# Patient Record
Sex: MALE | Race: OTHER | HISPANIC OR LATINO | URBAN - METROPOLITAN AREA
[De-identification: names, ages, dates, MRNs, and addresses within clinical notes are randomized per-mention and may not be internally consistent; named-entity substitution may affect disease eponyms.]

---

## 2017-01-24 ENCOUNTER — INPATIENT (INPATIENT)
Facility: HOSPITAL | Age: 52
LOS: 3 days | Discharge: HOME | DRG: 287 | End: 2017-01-28
Attending: INTERNAL MEDICINE | Admitting: INTERNAL MEDICINE
Payer: COMMERCIAL

## 2017-01-24 VITALS
RESPIRATION RATE: 18 BRPM | HEART RATE: 91 BPM | TEMPERATURE: 99 F | DIASTOLIC BLOOD PRESSURE: 107 MMHG | SYSTOLIC BLOOD PRESSURE: 171 MMHG | OXYGEN SATURATION: 95 %

## 2017-01-24 DIAGNOSIS — R07.9 CHEST PAIN, UNSPECIFIED: ICD-10-CM

## 2017-01-24 DIAGNOSIS — Z95.5 PRESENCE OF CORONARY ANGIOPLASTY IMPLANT AND GRAFT: Chronic | ICD-10-CM

## 2017-01-24 LAB
ALBUMIN SERPL ELPH-MCNC: 4.6 G/DL — SIGNIFICANT CHANGE UP (ref 3.3–5)
ALP SERPL-CCNC: 59 U/L — SIGNIFICANT CHANGE UP (ref 40–120)
ALT FLD-CCNC: 16 U/L RC — SIGNIFICANT CHANGE UP (ref 10–45)
ANION GAP SERPL CALC-SCNC: 14 MMOL/L — SIGNIFICANT CHANGE UP (ref 5–17)
AST SERPL-CCNC: 18 U/L — SIGNIFICANT CHANGE UP (ref 10–40)
BASOPHILS # BLD AUTO: 0 K/UL — SIGNIFICANT CHANGE UP (ref 0–0.2)
BASOPHILS NFR BLD AUTO: 0.1 % — SIGNIFICANT CHANGE UP (ref 0–2)
BILIRUB SERPL-MCNC: 0.4 MG/DL — SIGNIFICANT CHANGE UP (ref 0.2–1.2)
BUN SERPL-MCNC: 24 MG/DL — HIGH (ref 7–23)
CALCIUM SERPL-MCNC: 9.8 MG/DL — SIGNIFICANT CHANGE UP (ref 8.4–10.5)
CHLORIDE SERPL-SCNC: 98 MMOL/L — SIGNIFICANT CHANGE UP (ref 96–108)
CK SERPL-CCNC: 152 U/L — SIGNIFICANT CHANGE UP (ref 30–200)
CO2 SERPL-SCNC: 29 MMOL/L — SIGNIFICANT CHANGE UP (ref 22–31)
CREAT SERPL-MCNC: 1.49 MG/DL — HIGH (ref 0.5–1.3)
EOSINOPHIL # BLD AUTO: 0.2 K/UL — SIGNIFICANT CHANGE UP (ref 0–0.5)
EOSINOPHIL NFR BLD AUTO: 1.6 % — SIGNIFICANT CHANGE UP (ref 0–6)
GAS PNL BLDV: SIGNIFICANT CHANGE UP
GLUCOSE SERPL-MCNC: 120 MG/DL — HIGH (ref 70–99)
HCT VFR BLD CALC: 46 % — SIGNIFICANT CHANGE UP (ref 39–50)
HGB BLD-MCNC: 15.2 G/DL — SIGNIFICANT CHANGE UP (ref 13–17)
INR BLD: 1.15 RATIO — SIGNIFICANT CHANGE UP (ref 0.88–1.16)
LYMPHOCYTES # BLD AUTO: 1.9 K/UL — SIGNIFICANT CHANGE UP (ref 1–3.3)
LYMPHOCYTES # BLD AUTO: 19.3 % — SIGNIFICANT CHANGE UP (ref 13–44)
MCHC RBC-ENTMCNC: 27.8 PG — SIGNIFICANT CHANGE UP (ref 27–34)
MCHC RBC-ENTMCNC: 33.2 GM/DL — SIGNIFICANT CHANGE UP (ref 32–36)
MCV RBC AUTO: 83.9 FL — SIGNIFICANT CHANGE UP (ref 80–100)
MONOCYTES # BLD AUTO: 1.3 K/UL — HIGH (ref 0–0.9)
MONOCYTES NFR BLD AUTO: 12.9 % — SIGNIFICANT CHANGE UP (ref 2–14)
NEUTROPHILS # BLD AUTO: 6.6 K/UL — SIGNIFICANT CHANGE UP (ref 1.8–7.4)
NEUTROPHILS NFR BLD AUTO: 66.1 % — SIGNIFICANT CHANGE UP (ref 43–77)
PLATELET # BLD AUTO: 220 K/UL — SIGNIFICANT CHANGE UP (ref 150–400)
POTASSIUM SERPL-MCNC: 4.4 MMOL/L — SIGNIFICANT CHANGE UP (ref 3.5–5.3)
POTASSIUM SERPL-SCNC: 4.4 MMOL/L — SIGNIFICANT CHANGE UP (ref 3.5–5.3)
PROT SERPL-MCNC: 8.1 G/DL — SIGNIFICANT CHANGE UP (ref 6–8.3)
PROTHROM AB SERPL-ACNC: 12.6 SEC — SIGNIFICANT CHANGE UP (ref 10–13.1)
RAPID RVP RESULT: DETECTED
RBC # BLD: 5.48 M/UL — SIGNIFICANT CHANGE UP (ref 4.2–5.8)
RBC # FLD: 13.2 % — SIGNIFICANT CHANGE UP (ref 10.3–14.5)
RSV RNA SPEC QL NAA+PROBE: DETECTED
SODIUM SERPL-SCNC: 141 MMOL/L — SIGNIFICANT CHANGE UP (ref 135–145)
TROPONIN T SERPL-MCNC: <0.01 NG/ML — SIGNIFICANT CHANGE UP (ref 0–0.06)
WBC # BLD: 10.1 K/UL — SIGNIFICANT CHANGE UP (ref 3.8–10.5)
WBC # FLD AUTO: 10.1 K/UL — SIGNIFICANT CHANGE UP (ref 3.8–10.5)

## 2017-01-24 PROCEDURE — 71020: CPT | Mod: 26

## 2017-01-24 PROCEDURE — 99223 1ST HOSP IP/OBS HIGH 75: CPT

## 2017-01-24 PROCEDURE — 93010 ELECTROCARDIOGRAM REPORT: CPT

## 2017-01-24 PROCEDURE — 99285 EMERGENCY DEPT VISIT HI MDM: CPT | Mod: 25

## 2017-01-24 RX ORDER — SODIUM CHLORIDE 9 MG/ML
3 INJECTION INTRAMUSCULAR; INTRAVENOUS; SUBCUTANEOUS ONCE
Qty: 0 | Refills: 0 | Status: COMPLETED | OUTPATIENT
Start: 2017-01-24 | End: 2017-01-24

## 2017-01-24 RX ORDER — IPRATROPIUM/ALBUTEROL SULFATE 18-103MCG
3 AEROSOL WITH ADAPTER (GRAM) INHALATION ONCE
Qty: 0 | Refills: 0 | Status: COMPLETED | OUTPATIENT
Start: 2017-01-24 | End: 2017-01-24

## 2017-01-24 RX ADMIN — Medication 3 MILLILITER(S): at 22:14

## 2017-01-24 RX ADMIN — SODIUM CHLORIDE 3 MILLILITER(S): 9 INJECTION INTRAMUSCULAR; INTRAVENOUS; SUBCUTANEOUS at 20:05

## 2017-01-24 RX ADMIN — Medication 40 MILLIGRAM(S): at 22:14

## 2017-01-24 NOTE — ED ADULT NURSE NOTE - OBJECTIVE STATEMENT
presents with c/o exertional SOB, CP worsening since Thursday. pt states has had fever, cough, congestion, sore throat, body aches. pt states was seen by PMD and was started on Medrol Pack, Levaquin. pt states symptoms are getting worse.

## 2017-01-24 NOTE — ED PROVIDER NOTE - OBJECTIVE STATEMENT
51 male presents with c/o exertional SOB, CP worsening since Thursday. pt states has had fever, cough, congestion, sore throat, body aches. pt states was seen by PMD and was started on Medrol Pack, Levaquin. pt states symptoms are getting worse.

## 2017-01-24 NOTE — ED PROVIDER NOTE - MEDICAL DECISION MAKING DETAILS
****ATTENDING**** 52yo male hx CAD, CHF, pw chest pain and shortness of breath. States pain is substernal, non radiating. + cough with sob that is worse with exertion. States his symptoms have been progressively getting worse. No fever or chills. No recent travel. On exam, Patient is awake,alert,oriented x 3. Patient is well appearing and in no acute distress. Patient's chest w b/l wheezing, +s1s2. Abdomen is soft nd/nt +BS. Extremity with no swelling or calf tenderness.  patient's EKG reviewed with changes, Check Labs, CE, Xray chest. RVP.   Patient took ASA 325mg today.

## 2017-01-24 NOTE — ED PROVIDER NOTE - ATTENDING CONTRIBUTION TO CARE
****ATTENDING**** 50yo male hx CAD, CHF, pw chest pain and shortness of breath. States pain is substernal, non radiating. + cough with sob that is worse with exertion. States his symptoms have been progressively getting worse. No fever or chills. No recent travel. On exam, Patient is awake,alert,oriented x 3. Patient is well appearing and in no acute distress. Patient's chest w b/l wheezing, +s1s2. Abdomen is soft nd/nt +BS. Extremity with no swelling or calf tenderness.  patient's EKG reviewed with changes, Check Labs, CE, Xray chest. RVP.   Patient took ASA 325mg today.

## 2017-01-25 DIAGNOSIS — I10 ESSENTIAL (PRIMARY) HYPERTENSION: ICD-10-CM

## 2017-01-25 DIAGNOSIS — I25.10 ATHEROSCLEROTIC HEART DISEASE OF NATIVE CORONARY ARTERY WITHOUT ANGINA PECTORIS: ICD-10-CM

## 2017-01-25 DIAGNOSIS — B97.4 RESPIRATORY SYNCYTIAL VIRUS AS THE CAUSE OF DISEASES CLASSIFIED ELSEWHERE: ICD-10-CM

## 2017-01-25 DIAGNOSIS — Z29.9 ENCOUNTER FOR PROPHYLACTIC MEASURES, UNSPECIFIED: ICD-10-CM

## 2017-01-25 DIAGNOSIS — R63.8 OTHER SYMPTOMS AND SIGNS CONCERNING FOOD AND FLUID INTAKE: ICD-10-CM

## 2017-01-25 DIAGNOSIS — R07.9 CHEST PAIN, UNSPECIFIED: ICD-10-CM

## 2017-01-25 LAB
ANION GAP SERPL CALC-SCNC: 17 MMOL/L — SIGNIFICANT CHANGE UP (ref 5–17)
BASOPHILS # BLD AUTO: 0.01 K/UL — SIGNIFICANT CHANGE UP (ref 0–0.2)
BASOPHILS NFR BLD AUTO: 0.1 % — SIGNIFICANT CHANGE UP (ref 0–2)
BUN SERPL-MCNC: 28 MG/DL — HIGH (ref 7–23)
CALCIUM SERPL-MCNC: 9.5 MG/DL — SIGNIFICANT CHANGE UP (ref 8.4–10.5)
CHLORIDE SERPL-SCNC: 94 MMOL/L — LOW (ref 96–108)
CK MB BLD-MCNC: 2.9 % — SIGNIFICANT CHANGE UP (ref 0–3.5)
CK MB CFR SERPL CALC: 2.9 NG/ML — SIGNIFICANT CHANGE UP (ref 0–6.7)
CK MB CFR SERPL CALC: 3.3 NG/ML — SIGNIFICANT CHANGE UP (ref 0–6.7)
CK SERPL-CCNC: 100 U/L — SIGNIFICANT CHANGE UP (ref 30–200)
CO2 SERPL-SCNC: 22 MMOL/L — SIGNIFICANT CHANGE UP (ref 22–31)
CREAT SERPL-MCNC: 1.66 MG/DL — HIGH (ref 0.5–1.3)
EOSINOPHIL # BLD AUTO: 0.02 K/UL — SIGNIFICANT CHANGE UP (ref 0–0.5)
EOSINOPHIL NFR BLD AUTO: 0.3 % — SIGNIFICANT CHANGE UP (ref 0–6)
GLUCOSE SERPL-MCNC: 377 MG/DL — HIGH (ref 70–99)
HCT VFR BLD CALC: 42.8 % — SIGNIFICANT CHANGE UP (ref 39–50)
HGB BLD-MCNC: 13.9 G/DL — SIGNIFICANT CHANGE UP (ref 13–17)
IMM GRANULOCYTES NFR BLD AUTO: 0.3 % — SIGNIFICANT CHANGE UP (ref 0–1.5)
LACTATE SERPL-SCNC: 2 MMOL/L — SIGNIFICANT CHANGE UP (ref 0.7–2)
LYMPHOCYTES # BLD AUTO: 1.12 K/UL — SIGNIFICANT CHANGE UP (ref 1–3.3)
LYMPHOCYTES # BLD AUTO: 14.8 % — SIGNIFICANT CHANGE UP (ref 13–44)
MCHC RBC-ENTMCNC: 27.4 PG — SIGNIFICANT CHANGE UP (ref 27–34)
MCHC RBC-ENTMCNC: 32.5 GM/DL — SIGNIFICANT CHANGE UP (ref 32–36)
MCV RBC AUTO: 84.3 FL — SIGNIFICANT CHANGE UP (ref 80–100)
MONOCYTES # BLD AUTO: 0.21 K/UL — SIGNIFICANT CHANGE UP (ref 0–0.9)
MONOCYTES NFR BLD AUTO: 2.8 % — SIGNIFICANT CHANGE UP (ref 2–14)
NEUTROPHILS # BLD AUTO: 6.18 K/UL — SIGNIFICANT CHANGE UP (ref 1.8–7.4)
NEUTROPHILS NFR BLD AUTO: 81.7 % — HIGH (ref 43–77)
PLATELET # BLD AUTO: 187 K/UL — SIGNIFICANT CHANGE UP (ref 150–400)
POTASSIUM SERPL-MCNC: 4.9 MMOL/L — SIGNIFICANT CHANGE UP (ref 3.5–5.3)
POTASSIUM SERPL-SCNC: 4.9 MMOL/L — SIGNIFICANT CHANGE UP (ref 3.5–5.3)
RBC # BLD: 5.08 M/UL — SIGNIFICANT CHANGE UP (ref 4.2–5.8)
RBC # FLD: 14.4 % — SIGNIFICANT CHANGE UP (ref 10.3–14.5)
SODIUM SERPL-SCNC: 133 MMOL/L — LOW (ref 135–145)
TROPONIN T SERPL-MCNC: <0.01 NG/ML — SIGNIFICANT CHANGE UP (ref 0–0.06)
TROPONIN T SERPL-MCNC: <0.01 NG/ML — SIGNIFICANT CHANGE UP (ref 0–0.06)
WBC # BLD: 7.56 K/UL — SIGNIFICANT CHANGE UP (ref 3.8–10.5)
WBC # FLD AUTO: 7.56 K/UL — SIGNIFICANT CHANGE UP (ref 3.8–10.5)

## 2017-01-25 PROCEDURE — 93306 TTE W/DOPPLER COMPLETE: CPT | Mod: 26

## 2017-01-25 PROCEDURE — 78582 LUNG VENTILAT&PERFUS IMAGING: CPT | Mod: 26

## 2017-01-25 RX ORDER — DOCUSATE SODIUM 100 MG
100 CAPSULE ORAL THREE TIMES A DAY
Qty: 0 | Refills: 0 | Status: DISCONTINUED | OUTPATIENT
Start: 2017-01-25 | End: 2017-01-28

## 2017-01-25 RX ORDER — ACETAMINOPHEN 500 MG
650 TABLET ORAL EVERY 6 HOURS
Qty: 0 | Refills: 0 | Status: DISCONTINUED | OUTPATIENT
Start: 2017-01-25 | End: 2017-01-28

## 2017-01-25 RX ORDER — PANTOPRAZOLE SODIUM 20 MG/1
40 TABLET, DELAYED RELEASE ORAL
Qty: 0 | Refills: 0 | Status: DISCONTINUED | OUTPATIENT
Start: 2017-01-25 | End: 2017-01-28

## 2017-01-25 RX ORDER — SENNA PLUS 8.6 MG/1
2 TABLET ORAL AT BEDTIME
Qty: 0 | Refills: 0 | Status: DISCONTINUED | OUTPATIENT
Start: 2017-01-25 | End: 2017-01-28

## 2017-01-25 RX ORDER — CLOPIDOGREL BISULFATE 75 MG/1
75 TABLET, FILM COATED ORAL DAILY
Qty: 0 | Refills: 0 | Status: DISCONTINUED | OUTPATIENT
Start: 2017-01-25 | End: 2017-01-28

## 2017-01-25 RX ORDER — BUDESONIDE, MICRONIZED 100 %
0.5 POWDER (GRAM) MISCELLANEOUS EVERY 12 HOURS
Qty: 0 | Refills: 0 | Status: DISCONTINUED | OUTPATIENT
Start: 2017-01-25 | End: 2017-01-28

## 2017-01-25 RX ORDER — CARVEDILOL PHOSPHATE 80 MG/1
25 CAPSULE, EXTENDED RELEASE ORAL EVERY 12 HOURS
Qty: 0 | Refills: 0 | Status: DISCONTINUED | OUTPATIENT
Start: 2017-01-25 | End: 2017-01-28

## 2017-01-25 RX ORDER — ATORVASTATIN CALCIUM 80 MG/1
40 TABLET, FILM COATED ORAL AT BEDTIME
Qty: 0 | Refills: 0 | Status: DISCONTINUED | OUTPATIENT
Start: 2017-01-25 | End: 2017-01-28

## 2017-01-25 RX ORDER — IPRATROPIUM/ALBUTEROL SULFATE 18-103MCG
3 AEROSOL WITH ADAPTER (GRAM) INHALATION
Qty: 0 | Refills: 0 | Status: DISCONTINUED | OUTPATIENT
Start: 2017-01-25 | End: 2017-01-28

## 2017-01-25 RX ORDER — IPRATROPIUM/ALBUTEROL SULFATE 18-103MCG
3 AEROSOL WITH ADAPTER (GRAM) INHALATION EVERY 6 HOURS
Qty: 0 | Refills: 0 | Status: DISCONTINUED | OUTPATIENT
Start: 2017-01-25 | End: 2017-01-25

## 2017-01-25 RX ORDER — LISINOPRIL 2.5 MG/1
10 TABLET ORAL DAILY
Qty: 0 | Refills: 0 | Status: DISCONTINUED | OUTPATIENT
Start: 2017-01-25 | End: 2017-01-28

## 2017-01-25 RX ORDER — HEPARIN SODIUM 5000 [USP'U]/ML
5000 INJECTION INTRAVENOUS; SUBCUTANEOUS EVERY 12 HOURS
Qty: 0 | Refills: 0 | Status: DISCONTINUED | OUTPATIENT
Start: 2017-01-25 | End: 2017-01-28

## 2017-01-25 RX ORDER — ASPIRIN/CALCIUM CARB/MAGNESIUM 324 MG
81 TABLET ORAL DAILY
Qty: 0 | Refills: 0 | Status: DISCONTINUED | OUTPATIENT
Start: 2017-01-25 | End: 2017-01-28

## 2017-01-25 RX ADMIN — Medication 81 MILLIGRAM(S): at 12:15

## 2017-01-25 RX ADMIN — CARVEDILOL PHOSPHATE 25 MILLIGRAM(S): 80 CAPSULE, EXTENDED RELEASE ORAL at 05:53

## 2017-01-25 RX ADMIN — Medication 3 MILLILITER(S): at 22:04

## 2017-01-25 RX ADMIN — Medication 20 MILLIGRAM(S): at 19:41

## 2017-01-25 RX ADMIN — Medication 3 MILLILITER(S): at 19:41

## 2017-01-25 RX ADMIN — CARVEDILOL PHOSPHATE 25 MILLIGRAM(S): 80 CAPSULE, EXTENDED RELEASE ORAL at 19:40

## 2017-01-25 RX ADMIN — Medication 40 MILLIGRAM(S): at 06:03

## 2017-01-25 RX ADMIN — PANTOPRAZOLE SODIUM 40 MILLIGRAM(S): 20 TABLET, DELAYED RELEASE ORAL at 05:53

## 2017-01-25 RX ADMIN — CLOPIDOGREL BISULFATE 75 MILLIGRAM(S): 75 TABLET, FILM COATED ORAL at 12:15

## 2017-01-25 RX ADMIN — Medication 3 MILLILITER(S): at 05:53

## 2017-01-25 RX ADMIN — Medication 3 MILLILITER(S): at 12:14

## 2017-01-25 RX ADMIN — Medication 0.5 MILLIGRAM(S): at 22:03

## 2017-01-25 RX ADMIN — ATORVASTATIN CALCIUM 40 MILLIGRAM(S): 80 TABLET, FILM COATED ORAL at 22:03

## 2017-01-25 RX ADMIN — LISINOPRIL 10 MILLIGRAM(S): 2.5 TABLET ORAL at 05:53

## 2017-01-25 NOTE — H&P ADULT. - EKG AND INTERPRETATION
NSR @ 80 BPM, no ST changes, no TWI, axis normal, poor R wave progression, changed from prior, but suspect lead placement

## 2017-01-25 NOTE — H&P ADULT. - PROBLEM SELECTOR PLAN 1
--continue duonebs q6h  --continue prednisone 40mg daily  --robitussin for symptoms  --holding ABx, doubt superinfection

## 2017-01-25 NOTE — H&P ADULT. - LAB RESULTS AND INTERPRETATION
Labs personally reviewed.  Labs notable for WBC 10.1 with normal diff, Plt 220,  Cr 1.49, BUN 24, troponin negative and , lactate 2.7, RVP positive for RSV.

## 2017-01-25 NOTE — H&P ADULT. - HISTORY OF PRESENT ILLNESS
This is a 51 year old gentleman with history of MI s/p stent in 2009 on aspirin and plavix, history of intracranial bleed, presenting with cough, SOB, fevers at home.  Patient initially noted onset of cough productive of brownish sputum 5 days PTA.  He presented to PMD that day and was prescribed a medrol dose pack and levaquin. The day following, he noted sore throat.  Over the weekend he had worsening MATAMOROS, then day PTA he began to notice 8/10 bandlike chest pain which only occured with coughing, non-exertional, non-radiating, non-positional.  He also noted fevers.      On arrival to ED, patient afebrile (Tmax 98.8), HR 90s, /70, satting 95% on RA.  Labs notable for WBC 10.1 with normal diff, Plt 220,  Cr 1.49, BUN 24, troponin negative and , lactate 2.7, RVP positive for RSV.  CXR clear with normal cardiomediastinal border.  Received 40 methylpred and duonebs with improvement in symptoms.

## 2017-01-25 NOTE — H&P ADULT. - NEGATIVE CARDIOVASCULAR SYMPTOMS
no orthopnea/no paroxysmal nocturnal dyspnea/no palpitations/no peripheral edema/no dyspnea on exertion

## 2017-01-25 NOTE — H&P ADULT. - PROBLEM SELECTOR PLAN 2
--DDx includes musculoskeletal in setting of cough, pericarditis, less likely ACS  --suspect this is 2/2 coughing, band-like across chest  --tylenol for pain  --control cough  --EKG with mild changes, suspect lead placement, no c/w pericarditis or ischemia  --will order TTE to r/o worsening failure in setting of infection (exam inconsistent with this) vs. pericarditis

## 2017-01-25 NOTE — H&P ADULT. - ASSESSMENT
This is a 51 year old gentleman with history of MI s/p stent in 2009 on aspirin and plavix, history of intracranial bleed, presenting with cough, SOB, fevers at home, found to be RSV positive.

## 2017-01-25 NOTE — H&P ADULT. - FAMILY HISTORY
<<-----Click on this checkbox to enter Family History Family history of hypertension in mother, Mother     Sibling  Still living? No  Family history of meningitis, Age at diagnosis: Age Unknown

## 2017-01-26 LAB
ANION GAP SERPL CALC-SCNC: 9 MMOL/L — SIGNIFICANT CHANGE UP (ref 5–17)
APPEARANCE UR: CLEAR — SIGNIFICANT CHANGE UP
BASOPHILS # BLD AUTO: 0.01 K/UL — SIGNIFICANT CHANGE UP (ref 0–0.2)
BASOPHILS NFR BLD AUTO: 0.1 % — SIGNIFICANT CHANGE UP (ref 0–2)
BILIRUB UR-MCNC: NEGATIVE — SIGNIFICANT CHANGE UP
BUN SERPL-MCNC: 30 MG/DL — HIGH (ref 7–23)
CALCIUM SERPL-MCNC: 9.2 MG/DL — SIGNIFICANT CHANGE UP (ref 8.4–10.5)
CHLORIDE SERPL-SCNC: 95 MMOL/L — LOW (ref 96–108)
CO2 SERPL-SCNC: 28 MMOL/L — SIGNIFICANT CHANGE UP (ref 22–31)
COLOR SPEC: YELLOW — SIGNIFICANT CHANGE UP
CREAT SERPL-MCNC: 1.27 MG/DL — SIGNIFICANT CHANGE UP (ref 0.5–1.3)
DIFF PNL FLD: NEGATIVE — SIGNIFICANT CHANGE UP
EOSINOPHIL # BLD AUTO: 0.01 K/UL — SIGNIFICANT CHANGE UP (ref 0–0.5)
EOSINOPHIL NFR BLD AUTO: 0.1 % — SIGNIFICANT CHANGE UP (ref 0–6)
GAS PNL BLDV: SIGNIFICANT CHANGE UP
GLUCOSE SERPL-MCNC: 417 MG/DL — HIGH (ref 70–99)
GLUCOSE UR QL: 1000 MG/DL
HCT VFR BLD CALC: 40.1 % — SIGNIFICANT CHANGE UP (ref 39–50)
HGB BLD-MCNC: 13.4 G/DL — SIGNIFICANT CHANGE UP (ref 13–17)
IMM GRANULOCYTES NFR BLD AUTO: 0.2 % — SIGNIFICANT CHANGE UP (ref 0–1.5)
KETONES UR-MCNC: NEGATIVE — SIGNIFICANT CHANGE UP
LEUKOCYTE ESTERASE UR-ACNC: NEGATIVE — SIGNIFICANT CHANGE UP
LYMPHOCYTES # BLD AUTO: 1.54 K/UL — SIGNIFICANT CHANGE UP (ref 1–3.3)
LYMPHOCYTES # BLD AUTO: 13.4 % — SIGNIFICANT CHANGE UP (ref 13–44)
MCHC RBC-ENTMCNC: 28 PG — SIGNIFICANT CHANGE UP (ref 27–34)
MCHC RBC-ENTMCNC: 33.4 GM/DL — SIGNIFICANT CHANGE UP (ref 32–36)
MCV RBC AUTO: 83.9 FL — SIGNIFICANT CHANGE UP (ref 80–100)
MONOCYTES # BLD AUTO: 0.43 K/UL — SIGNIFICANT CHANGE UP (ref 0–0.9)
MONOCYTES NFR BLD AUTO: 3.8 % — SIGNIFICANT CHANGE UP (ref 2–14)
NEUTROPHILS # BLD AUTO: 9.44 K/UL — HIGH (ref 1.8–7.4)
NEUTROPHILS NFR BLD AUTO: 82.4 % — HIGH (ref 43–77)
NITRITE UR-MCNC: NEGATIVE — SIGNIFICANT CHANGE UP
PH UR: 5.5 — SIGNIFICANT CHANGE UP (ref 5–8)
PLATELET # BLD AUTO: 193 K/UL — SIGNIFICANT CHANGE UP (ref 150–400)
POTASSIUM SERPL-MCNC: 4.8 MMOL/L — SIGNIFICANT CHANGE UP (ref 3.5–5.3)
POTASSIUM SERPL-SCNC: 4.8 MMOL/L — SIGNIFICANT CHANGE UP (ref 3.5–5.3)
PROT UR-MCNC: NEGATIVE MG/DL — SIGNIFICANT CHANGE UP
RBC # BLD: 4.78 M/UL — SIGNIFICANT CHANGE UP (ref 4.2–5.8)
RBC # FLD: 14.3 % — SIGNIFICANT CHANGE UP (ref 10.3–14.5)
SODIUM SERPL-SCNC: 132 MMOL/L — LOW (ref 135–145)
SP GR SPEC: 1.04 — HIGH (ref 1.01–1.02)
TSH SERPL-MCNC: 0.29 UIU/ML — SIGNIFICANT CHANGE UP (ref 0.27–4.2)
UROBILINOGEN FLD QL: NEGATIVE MG/DL — SIGNIFICANT CHANGE UP
WBC # BLD: 11.45 K/UL — HIGH (ref 3.8–10.5)
WBC # FLD AUTO: 11.45 K/UL — HIGH (ref 3.8–10.5)

## 2017-01-26 PROCEDURE — 94010 BREATHING CAPACITY TEST: CPT | Mod: 26

## 2017-01-26 RX ORDER — FUROSEMIDE 40 MG
40 TABLET ORAL
Qty: 0 | Refills: 0 | Status: DISCONTINUED | OUTPATIENT
Start: 2017-01-26 | End: 2017-01-27

## 2017-01-26 RX ADMIN — Medication 100 MILLIGRAM(S): at 22:07

## 2017-01-26 RX ADMIN — Medication 20 MILLIGRAM(S): at 22:10

## 2017-01-26 RX ADMIN — ATORVASTATIN CALCIUM 40 MILLIGRAM(S): 80 TABLET, FILM COATED ORAL at 22:07

## 2017-01-26 RX ADMIN — CARVEDILOL PHOSPHATE 25 MILLIGRAM(S): 80 CAPSULE, EXTENDED RELEASE ORAL at 17:13

## 2017-01-26 RX ADMIN — Medication 20 MILLIGRAM(S): at 00:28

## 2017-01-26 RX ADMIN — Medication 81 MILLIGRAM(S): at 11:04

## 2017-01-26 RX ADMIN — Medication 3 MILLILITER(S): at 22:06

## 2017-01-26 RX ADMIN — Medication 3 MILLILITER(S): at 13:00

## 2017-01-26 RX ADMIN — Medication 3 MILLILITER(S): at 09:50

## 2017-01-26 RX ADMIN — CARVEDILOL PHOSPHATE 25 MILLIGRAM(S): 80 CAPSULE, EXTENDED RELEASE ORAL at 05:47

## 2017-01-26 RX ADMIN — Medication 20 MILLIGRAM(S): at 11:06

## 2017-01-26 RX ADMIN — Medication 0.5 MILLIGRAM(S): at 17:14

## 2017-01-26 RX ADMIN — Medication 20 MILLIGRAM(S): at 05:47

## 2017-01-26 RX ADMIN — Medication 3 MILLILITER(S): at 05:47

## 2017-01-26 RX ADMIN — Medication 3 MILLILITER(S): at 17:15

## 2017-01-26 RX ADMIN — Medication 40 MILLIGRAM(S): at 22:05

## 2017-01-26 RX ADMIN — CLOPIDOGREL BISULFATE 75 MILLIGRAM(S): 75 TABLET, FILM COATED ORAL at 11:03

## 2017-01-26 RX ADMIN — LISINOPRIL 10 MILLIGRAM(S): 2.5 TABLET ORAL at 05:47

## 2017-01-26 RX ADMIN — Medication 0.5 MILLIGRAM(S): at 05:47

## 2017-01-26 RX ADMIN — PANTOPRAZOLE SODIUM 40 MILLIGRAM(S): 20 TABLET, DELAYED RELEASE ORAL at 07:13

## 2017-01-27 PROCEDURE — 93458 L HRT ARTERY/VENTRICLE ANGIO: CPT | Mod: 26

## 2017-01-27 RX ORDER — FUROSEMIDE 40 MG
40 TABLET ORAL DAILY
Qty: 0 | Refills: 0 | Status: DISCONTINUED | OUTPATIENT
Start: 2017-01-27 | End: 2017-01-28

## 2017-01-27 RX ADMIN — Medication 100 MILLIGRAM(S): at 05:18

## 2017-01-27 RX ADMIN — Medication 20 MILLIGRAM(S): at 05:26

## 2017-01-27 RX ADMIN — Medication 40 MILLIGRAM(S): at 05:19

## 2017-01-27 RX ADMIN — ATORVASTATIN CALCIUM 40 MILLIGRAM(S): 80 TABLET, FILM COATED ORAL at 23:33

## 2017-01-27 RX ADMIN — Medication 20 MILLIGRAM(S): at 18:07

## 2017-01-27 RX ADMIN — Medication 3 MILLILITER(S): at 05:19

## 2017-01-27 RX ADMIN — CARVEDILOL PHOSPHATE 25 MILLIGRAM(S): 80 CAPSULE, EXTENDED RELEASE ORAL at 18:12

## 2017-01-27 RX ADMIN — Medication 0.5 MILLIGRAM(S): at 05:19

## 2017-01-27 RX ADMIN — CARVEDILOL PHOSPHATE 25 MILLIGRAM(S): 80 CAPSULE, EXTENDED RELEASE ORAL at 05:17

## 2017-01-27 RX ADMIN — Medication 100 MILLIGRAM(S): at 23:33

## 2017-01-27 RX ADMIN — Medication 3 MILLILITER(S): at 11:07

## 2017-01-27 RX ADMIN — Medication 20 MILLIGRAM(S): at 23:35

## 2017-01-27 RX ADMIN — PANTOPRAZOLE SODIUM 40 MILLIGRAM(S): 20 TABLET, DELAYED RELEASE ORAL at 07:03

## 2017-01-27 RX ADMIN — Medication 3 MILLILITER(S): at 18:14

## 2017-01-27 RX ADMIN — Medication 0.5 MILLIGRAM(S): at 20:05

## 2017-01-27 RX ADMIN — Medication 3 MILLILITER(S): at 15:12

## 2017-01-27 RX ADMIN — Medication 81 MILLIGRAM(S): at 11:24

## 2017-01-27 RX ADMIN — CLOPIDOGREL BISULFATE 75 MILLIGRAM(S): 75 TABLET, FILM COATED ORAL at 11:25

## 2017-01-27 RX ADMIN — LISINOPRIL 10 MILLIGRAM(S): 2.5 TABLET ORAL at 05:18

## 2017-01-27 RX ADMIN — Medication 20 MILLIGRAM(S): at 11:25

## 2017-01-27 RX ADMIN — Medication 3 MILLILITER(S): at 23:33

## 2017-01-28 ENCOUNTER — TRANSCRIPTION ENCOUNTER (OUTPATIENT)
Age: 52
End: 2017-01-28

## 2017-01-28 VITALS — WEIGHT: 197.09 LBS

## 2017-01-28 PROCEDURE — 82565 ASSAY OF CREATININE: CPT

## 2017-01-28 PROCEDURE — 82550 ASSAY OF CK (CPK): CPT

## 2017-01-28 PROCEDURE — 84443 ASSAY THYROID STIM HORMONE: CPT

## 2017-01-28 PROCEDURE — 84484 ASSAY OF TROPONIN QUANT: CPT

## 2017-01-28 PROCEDURE — 87486 CHLMYD PNEUM DNA AMP PROBE: CPT

## 2017-01-28 PROCEDURE — 85027 COMPLETE CBC AUTOMATED: CPT

## 2017-01-28 PROCEDURE — 82553 CREATINE MB FRACTION: CPT

## 2017-01-28 PROCEDURE — 87581 M.PNEUMON DNA AMP PROBE: CPT

## 2017-01-28 PROCEDURE — 81003 URINALYSIS AUTO W/O SCOPE: CPT

## 2017-01-28 PROCEDURE — 85014 HEMATOCRIT: CPT

## 2017-01-28 PROCEDURE — 94010 BREATHING CAPACITY TEST: CPT

## 2017-01-28 PROCEDURE — 71046 X-RAY EXAM CHEST 2 VIEWS: CPT

## 2017-01-28 PROCEDURE — 87798 DETECT AGENT NOS DNA AMP: CPT

## 2017-01-28 PROCEDURE — C1769: CPT

## 2017-01-28 PROCEDURE — 94640 AIRWAY INHALATION TREATMENT: CPT

## 2017-01-28 PROCEDURE — 84295 ASSAY OF SERUM SODIUM: CPT

## 2017-01-28 PROCEDURE — 82803 BLOOD GASES ANY COMBINATION: CPT

## 2017-01-28 PROCEDURE — 82435 ASSAY OF BLOOD CHLORIDE: CPT

## 2017-01-28 PROCEDURE — 82947 ASSAY GLUCOSE BLOOD QUANT: CPT

## 2017-01-28 PROCEDURE — 84132 ASSAY OF SERUM POTASSIUM: CPT

## 2017-01-28 PROCEDURE — 93458 L HRT ARTERY/VENTRICLE ANGIO: CPT

## 2017-01-28 PROCEDURE — 80053 COMPREHEN METABOLIC PANEL: CPT

## 2017-01-28 PROCEDURE — 83605 ASSAY OF LACTIC ACID: CPT

## 2017-01-28 PROCEDURE — 87633 RESP VIRUS 12-25 TARGETS: CPT

## 2017-01-28 PROCEDURE — A9540: CPT

## 2017-01-28 PROCEDURE — C8929: CPT

## 2017-01-28 PROCEDURE — 99285 EMERGENCY DEPT VISIT HI MDM: CPT | Mod: 25

## 2017-01-28 PROCEDURE — A9567: CPT

## 2017-01-28 PROCEDURE — C1887: CPT

## 2017-01-28 PROCEDURE — 78582 LUNG VENTILAT&PERFUS IMAGING: CPT

## 2017-01-28 PROCEDURE — 82330 ASSAY OF CALCIUM: CPT

## 2017-01-28 PROCEDURE — C1894: CPT

## 2017-01-28 PROCEDURE — 80048 BASIC METABOLIC PNL TOTAL CA: CPT

## 2017-01-28 PROCEDURE — 83880 ASSAY OF NATRIURETIC PEPTIDE: CPT

## 2017-01-28 PROCEDURE — 93005 ELECTROCARDIOGRAM TRACING: CPT

## 2017-01-28 PROCEDURE — 85610 PROTHROMBIN TIME: CPT

## 2017-01-28 RX ORDER — ASPIRIN/CALCIUM CARB/MAGNESIUM 324 MG
1 TABLET ORAL
Qty: 0 | Refills: 0 | DISCHARGE
Start: 2017-01-28

## 2017-01-28 RX ORDER — ASPIRIN/CALCIUM CARB/MAGNESIUM 324 MG
1 TABLET ORAL
Qty: 0 | Refills: 0 | COMMUNITY
Start: 2017-01-28

## 2017-01-28 RX ADMIN — CARVEDILOL PHOSPHATE 25 MILLIGRAM(S): 80 CAPSULE, EXTENDED RELEASE ORAL at 17:35

## 2017-01-28 RX ADMIN — CARVEDILOL PHOSPHATE 25 MILLIGRAM(S): 80 CAPSULE, EXTENDED RELEASE ORAL at 05:26

## 2017-01-28 RX ADMIN — Medication 0.5 MILLIGRAM(S): at 17:33

## 2017-01-28 RX ADMIN — Medication 3 MILLILITER(S): at 10:50

## 2017-01-28 RX ADMIN — Medication 100 MILLIGRAM(S): at 05:28

## 2017-01-28 RX ADMIN — Medication 40 MILLIGRAM(S): at 05:30

## 2017-01-28 RX ADMIN — Medication 0.5 MILLIGRAM(S): at 05:30

## 2017-01-28 RX ADMIN — PANTOPRAZOLE SODIUM 40 MILLIGRAM(S): 20 TABLET, DELAYED RELEASE ORAL at 06:52

## 2017-01-28 RX ADMIN — LISINOPRIL 10 MILLIGRAM(S): 2.5 TABLET ORAL at 05:26

## 2017-01-28 RX ADMIN — Medication 3 MILLILITER(S): at 05:30

## 2017-01-28 RX ADMIN — Medication 20 MILLIGRAM(S): at 05:26

## 2017-01-28 RX ADMIN — Medication 50 MILLIGRAM(S): at 12:35

## 2017-01-28 RX ADMIN — Medication 81 MILLIGRAM(S): at 12:38

## 2017-01-28 RX ADMIN — CLOPIDOGREL BISULFATE 75 MILLIGRAM(S): 75 TABLET, FILM COATED ORAL at 12:38

## 2017-01-28 RX ADMIN — Medication 3 MILLILITER(S): at 13:24

## 2017-01-28 RX ADMIN — Medication 3 MILLILITER(S): at 17:33

## 2017-01-28 NOTE — DISCHARGE NOTE ADULT - MEDICATION SUMMARY - MEDICATIONS TO TAKE
I will START or STAY ON the medications listed below when I get home from the hospital:    predniSONE 10 mg oral tablet  -- 5 tab(s) by mouth once a day MDD:take 50mg for 4 days  / then follow taper  50mg x 4 days  40mg x 5 days  30mg x 5 days   20mg x 5 days   10mg x 5 days   -- It is very important that you take or use this exactly as directed.  Do not skip doses or discontinue unless directed by your doctor.  Obtain medical advice before taking any non-prescription drugs as some may affect the action of this medication.  Take with food or milk.    -- Indication: For steroid taper     aspirin 81 mg oral delayed release tablet  -- 1 tab(s) by mouth once a day  -- Indication: For Heart     aspirin 81 mg oral delayed release tablet  -- 1 tab(s) by mouth once a day  -- Indication: For Heart     lisinopril 10 mg oral tablet  -- 1 tab(s) by mouth once a day  -- Indication: For Heart     Crestor 20 mg oral tablet  -- 1 tab(s) by mouth once a day (at bedtime)  -- Indication: For Cholesterol    Plavix 75 mg oral tablet  -- 1 tab(s) by mouth once a day  -- Indication: For Heart     carvedilol 25 mg oral tablet  -- 1 tab(s) by mouth every 12 hours  -- Indication: For Heart     Breo Ellipta 200 mcg-25 mcg/inh inhalation powder  -- 1 puff(s) inhaled once a day  -- Indication: For breathing     furosemide 40 mg oral tablet  -- 1 tab(s) by mouth once a day  -- Indication: For diuretic    hydroCHLOROthiazide 25 mg oral tablet  -- 1 tab(s) by mouth once a day  -- Indication: For diuretic    CoQ10 300 mg oral capsule  -- 1 cap(s) by mouth once a day  -- Indication: For supplement     pantoprazole 40 mg oral delayed release tablet  -- 1 tab(s) by mouth once a day (before a meal)  -- Indication: For GI     dextromethorphan-guaiFENesin 10 mg-100 mg/5 mL oral liquid  -- 15 milliliter(s) by mouth every 6 hours, As Needed  -- Indication: For breathing

## 2017-01-28 NOTE — DISCHARGE NOTE ADULT - ADDITIONAL INSTRUCTIONS
Take your medications as prescribed   Follow up with your PMD to be seen within 4-7 days    Call for appointment day after discharge   Bring your discharge instructions and your medication list with you to your follow up appointment   Call your PMD / pulmonologist / or go to the nearest Emergency Department with any worsening symptoms

## 2017-01-28 NOTE — DISCHARGE NOTE ADULT - CARE PROVIDER_API CALL
Luc Lynn (), Cardiology; Internal Medicine  [address]  [place] [address]  Phone: [phone]  Fax: [phone]    Dhaval Curran), Internal Medicine; Pulmonary Disease  [address] [place] [address] [address]  [place] [address]  Phone: [phone]  Fax: [phone]

## 2017-01-28 NOTE — DISCHARGE NOTE ADULT - HOSPITAL COURSE
to be completed by attending Admitted with chest pain and shortness of breath. Found to be flu positive. Pulmonary consulted. TTE revealed reduced LVEF. LHC revealed non obstructive coronary disease. Started on cardiac meds. Discharged home with scheduled close outpatient follow up.

## 2017-01-28 NOTE — DISCHARGE NOTE ADULT - MEDICATION SUMMARY - MEDICATIONS TO STOP TAKING
I will STOP taking the medications listed below when I get home from the hospital:    levoFLOXacin 500 mg oral tablet  -- 1 tab(s) by mouth every 24 hours    MethylPREDNISolone Dose Pack 4 mg oral tablet  --  by mouth

## 2017-01-28 NOTE — DISCHARGE NOTE ADULT - PATIENT PORTAL LINK FT
“You can access the [facility] Patient Portal, offered by [facility], by registering with the following website: [email or web address]”

## 2017-01-28 NOTE — DISCHARGE NOTE ADULT - CARE PLAN
Principal Discharge DX:	Respiratory syncytial virus  Goal:	stable  - labs trended and stable  - seen and followed by pulmonary  / s/p course of IV steroids and now on prednisone taper  Instructions for follow-up, activity and diet:	Take your medications as prescribed   Follow up with your PMD to be seen within 4-7 days    Call for appointment day after discharge   Bring your discharge instructions and your medication list with you to your follow up appointment   Call your PMD / pulmonologist / or go to the nearest Emergency Department with any worsening symptoms  Secondary Diagnosis:	Chest pain  Goal:	s/p seen and followed by cardiology / s/p TTE  Instructions for follow-up, activity and diet:	Take your medications as prescribed   Follow up with your PMD to be seen within 4-7 days    Call for appointment day after discharge   Bring your discharge instructions and your medication list with you to your follow up appointment   Call your PMD / pulmonologist / or go to the nearest Emergency Department with any worsening symptoms  Secondary Diagnosis:	HTN (hypertension)  Goal:	stable  Instructions for follow-up, activity and diet:	Continue with the medications as above   Follow up as above  Secondary Diagnosis:	CAD (coronary artery disease)  Goal:	stable  Instructions for follow-up, activity and diet:	Continue with the medications as above     Follow up as above

## 2017-01-28 NOTE — DISCHARGE NOTE ADULT - PLAN OF CARE
stable  - labs trended and stable  - seen and followed by pulmonary  / s/p course of IV steroids and now on prednisone taper Take your medications as prescribed   Follow up with your PMD to be seen within 4-7 days    Call for appointment day after discharge   Bring your discharge instructions and your medication list with you to your follow up appointment   Call your PMD / pulmonologist / or go to the nearest Emergency Department with any worsening symptoms s/p seen and followed by cardiology / s/p TTE stable Continue with the medications as above   Follow up as above Continue with the medications as above     Follow up as above

## 2018-01-03 ENCOUNTER — INPATIENT (INPATIENT)
Facility: HOSPITAL | Age: 53
LOS: 0 days | Discharge: HOME | DRG: 203 | End: 2018-01-03
Attending: INTERNAL MEDICINE | Admitting: INTERNAL MEDICINE
Payer: COMMERCIAL

## 2018-01-03 VITALS
TEMPERATURE: 99 F | RESPIRATION RATE: 22 BRPM | HEART RATE: 90 BPM | SYSTOLIC BLOOD PRESSURE: 157 MMHG | DIASTOLIC BLOOD PRESSURE: 99 MMHG | OXYGEN SATURATION: 96 %

## 2018-01-03 VITALS
DIASTOLIC BLOOD PRESSURE: 98 MMHG | HEART RATE: 79 BPM | SYSTOLIC BLOOD PRESSURE: 128 MMHG | TEMPERATURE: 98 F | RESPIRATION RATE: 20 BRPM | OXYGEN SATURATION: 98 %

## 2018-01-03 DIAGNOSIS — R06.02 SHORTNESS OF BREATH: ICD-10-CM

## 2018-01-03 DIAGNOSIS — I25.10 ATHEROSCLEROTIC HEART DISEASE OF NATIVE CORONARY ARTERY WITHOUT ANGINA PECTORIS: ICD-10-CM

## 2018-01-03 DIAGNOSIS — R07.9 CHEST PAIN, UNSPECIFIED: ICD-10-CM

## 2018-01-03 DIAGNOSIS — Z95.5 PRESENCE OF CORONARY ANGIOPLASTY IMPLANT AND GRAFT: Chronic | ICD-10-CM

## 2018-01-03 DIAGNOSIS — J98.01 ACUTE BRONCHOSPASM: ICD-10-CM

## 2018-01-03 LAB
ALBUMIN SERPL ELPH-MCNC: 4.1 G/DL — SIGNIFICANT CHANGE UP (ref 3.3–5)
ALP SERPL-CCNC: 49 U/L — SIGNIFICANT CHANGE UP (ref 40–120)
ALT FLD-CCNC: 21 U/L RC — SIGNIFICANT CHANGE UP (ref 10–45)
ANION GAP SERPL CALC-SCNC: 14 MMOL/L — SIGNIFICANT CHANGE UP (ref 5–17)
AST SERPL-CCNC: 29 U/L — SIGNIFICANT CHANGE UP (ref 10–40)
BASOPHILS # BLD AUTO: 0 K/UL — SIGNIFICANT CHANGE UP (ref 0–0.2)
BASOPHILS NFR BLD AUTO: 0.1 % — SIGNIFICANT CHANGE UP (ref 0–2)
BILIRUB SERPL-MCNC: 0.3 MG/DL — SIGNIFICANT CHANGE UP (ref 0.2–1.2)
BUN SERPL-MCNC: 26 MG/DL — HIGH (ref 7–23)
CALCIUM SERPL-MCNC: 9.7 MG/DL — SIGNIFICANT CHANGE UP (ref 8.4–10.5)
CHLORIDE SERPL-SCNC: 97 MMOL/L — SIGNIFICANT CHANGE UP (ref 96–108)
CK MB BLD-MCNC: 1 % — SIGNIFICANT CHANGE UP (ref 0–3.5)
CK MB BLD-MCNC: 1.3 % — SIGNIFICANT CHANGE UP (ref 0–3.5)
CK MB CFR SERPL CALC: 3.4 NG/ML — SIGNIFICANT CHANGE UP (ref 0–6.7)
CK MB CFR SERPL CALC: 4.2 NG/ML — SIGNIFICANT CHANGE UP (ref 0–6.7)
CK SERPL-CCNC: 319 U/L — HIGH (ref 30–200)
CK SERPL-CCNC: 350 U/L — HIGH (ref 30–200)
CO2 SERPL-SCNC: 26 MMOL/L — SIGNIFICANT CHANGE UP (ref 22–31)
CREAT SERPL-MCNC: 1.54 MG/DL — HIGH (ref 0.5–1.3)
D DIMER BLD IA.RAPID-MCNC: 2045 NG/ML DDU — HIGH
EOSINOPHIL # BLD AUTO: 0.3 K/UL — SIGNIFICANT CHANGE UP (ref 0–0.5)
EOSINOPHIL NFR BLD AUTO: 4.6 % — SIGNIFICANT CHANGE UP (ref 0–6)
GAS PNL BLDV: SIGNIFICANT CHANGE UP
GLUCOSE BLDC GLUCOMTR-MCNC: 316 MG/DL — HIGH (ref 70–99)
GLUCOSE SERPL-MCNC: 136 MG/DL — HIGH (ref 70–99)
HCT VFR BLD CALC: 44.2 % — SIGNIFICANT CHANGE UP (ref 39–50)
HGB BLD-MCNC: 15.5 G/DL — SIGNIFICANT CHANGE UP (ref 13–17)
HMPV RNA SPEC QL NAA+PROBE: DETECTED
LYMPHOCYTES # BLD AUTO: 2 K/UL — SIGNIFICANT CHANGE UP (ref 1–3.3)
LYMPHOCYTES # BLD AUTO: 31.6 % — SIGNIFICANT CHANGE UP (ref 13–44)
MCHC RBC-ENTMCNC: 29.9 PG — SIGNIFICANT CHANGE UP (ref 27–34)
MCHC RBC-ENTMCNC: 35 GM/DL — SIGNIFICANT CHANGE UP (ref 32–36)
MCV RBC AUTO: 85.5 FL — SIGNIFICANT CHANGE UP (ref 80–100)
MONOCYTES # BLD AUTO: 0.9 K/UL — SIGNIFICANT CHANGE UP (ref 0–0.9)
MONOCYTES NFR BLD AUTO: 13.3 % — SIGNIFICANT CHANGE UP (ref 2–14)
NEUTROPHILS # BLD AUTO: 3.3 K/UL — SIGNIFICANT CHANGE UP (ref 1.8–7.4)
NEUTROPHILS NFR BLD AUTO: 50.3 % — SIGNIFICANT CHANGE UP (ref 43–77)
NT-PROBNP SERPL-SCNC: 228 PG/ML — SIGNIFICANT CHANGE UP (ref 0–300)
PLATELET # BLD AUTO: 173 K/UL — SIGNIFICANT CHANGE UP (ref 150–400)
POTASSIUM SERPL-MCNC: 3.3 MMOL/L — LOW (ref 3.5–5.3)
POTASSIUM SERPL-SCNC: 3.3 MMOL/L — LOW (ref 3.5–5.3)
PROT SERPL-MCNC: 7.8 G/DL — SIGNIFICANT CHANGE UP (ref 6–8.3)
RAPID RVP RESULT: DETECTED
RBC # BLD: 5.17 M/UL — SIGNIFICANT CHANGE UP (ref 4.2–5.8)
RBC # FLD: 12.5 % — SIGNIFICANT CHANGE UP (ref 10.3–14.5)
SODIUM SERPL-SCNC: 137 MMOL/L — SIGNIFICANT CHANGE UP (ref 135–145)
TROPONIN T SERPL-MCNC: <0.01 NG/ML — SIGNIFICANT CHANGE UP (ref 0–0.06)
TROPONIN T SERPL-MCNC: <0.01 NG/ML — SIGNIFICANT CHANGE UP (ref 0–0.06)
WBC # BLD: 6.5 K/UL — SIGNIFICANT CHANGE UP (ref 3.8–10.5)
WBC # FLD AUTO: 6.5 K/UL — SIGNIFICANT CHANGE UP (ref 3.8–10.5)

## 2018-01-03 PROCEDURE — 99285 EMERGENCY DEPT VISIT HI MDM: CPT

## 2018-01-03 PROCEDURE — 78582 LUNG VENTILAT&PERFUS IMAGING: CPT | Mod: 26

## 2018-01-03 PROCEDURE — 71046 X-RAY EXAM CHEST 2 VIEWS: CPT | Mod: 26

## 2018-01-03 RX ORDER — HEPARIN SODIUM 5000 [USP'U]/ML
5000 INJECTION INTRAVENOUS; SUBCUTANEOUS EVERY 8 HOURS
Qty: 0 | Refills: 0 | Status: DISCONTINUED | OUTPATIENT
Start: 2018-01-03 | End: 2018-01-03

## 2018-01-03 RX ORDER — DEXTROSE 50 % IN WATER 50 %
12.5 SYRINGE (ML) INTRAVENOUS ONCE
Qty: 0 | Refills: 0 | Status: DISCONTINUED | OUTPATIENT
Start: 2018-01-03 | End: 2018-01-03

## 2018-01-03 RX ORDER — CLOPIDOGREL BISULFATE 75 MG/1
75 TABLET, FILM COATED ORAL DAILY
Qty: 0 | Refills: 0 | Status: DISCONTINUED | OUTPATIENT
Start: 2018-01-03 | End: 2018-01-03

## 2018-01-03 RX ORDER — DEXTROSE 50 % IN WATER 50 %
1 SYRINGE (ML) INTRAVENOUS ONCE
Qty: 0 | Refills: 0 | Status: DISCONTINUED | OUTPATIENT
Start: 2018-01-03 | End: 2018-01-03

## 2018-01-03 RX ORDER — GLUCAGON INJECTION, SOLUTION 0.5 MG/.1ML
1 INJECTION, SOLUTION SUBCUTANEOUS ONCE
Qty: 0 | Refills: 0 | Status: DISCONTINUED | OUTPATIENT
Start: 2018-01-03 | End: 2018-01-03

## 2018-01-03 RX ORDER — ASPIRIN/CALCIUM CARB/MAGNESIUM 324 MG
81 TABLET ORAL DAILY
Qty: 0 | Refills: 0 | Status: DISCONTINUED | OUTPATIENT
Start: 2018-01-03 | End: 2018-01-03

## 2018-01-03 RX ORDER — BUDESONIDE, MICRONIZED 100 %
0.5 POWDER (GRAM) MISCELLANEOUS
Qty: 0 | Refills: 0 | Status: DISCONTINUED | OUTPATIENT
Start: 2018-01-03 | End: 2018-01-03

## 2018-01-03 RX ORDER — SODIUM CHLORIDE 9 MG/ML
1000 INJECTION, SOLUTION INTRAVENOUS
Qty: 0 | Refills: 0 | Status: DISCONTINUED | OUTPATIENT
Start: 2018-01-03 | End: 2018-01-03

## 2018-01-03 RX ORDER — IPRATROPIUM/ALBUTEROL SULFATE 18-103MCG
3 AEROSOL WITH ADAPTER (GRAM) INHALATION ONCE
Qty: 0 | Refills: 0 | Status: COMPLETED | OUTPATIENT
Start: 2018-01-03 | End: 2018-01-03

## 2018-01-03 RX ORDER — ASPIRIN/CALCIUM CARB/MAGNESIUM 324 MG
324 TABLET ORAL ONCE
Qty: 0 | Refills: 0 | Status: COMPLETED | OUTPATIENT
Start: 2018-01-03 | End: 2018-01-03

## 2018-01-03 RX ORDER — DEXTROSE 50 % IN WATER 50 %
25 SYRINGE (ML) INTRAVENOUS ONCE
Qty: 0 | Refills: 0 | Status: DISCONTINUED | OUTPATIENT
Start: 2018-01-03 | End: 2018-01-03

## 2018-01-03 RX ORDER — ALBUTEROL 90 UG/1
2.5 AEROSOL, METERED ORAL
Qty: 0 | Refills: 0 | Status: DISCONTINUED | OUTPATIENT
Start: 2018-01-03 | End: 2018-01-03

## 2018-01-03 RX ORDER — HYDROCHLOROTHIAZIDE 25 MG
25 TABLET ORAL DAILY
Qty: 0 | Refills: 0 | Status: DISCONTINUED | OUTPATIENT
Start: 2018-01-03 | End: 2018-01-03

## 2018-01-03 RX ORDER — UBIDECARENONE 100 MG
1 CAPSULE ORAL
Qty: 0 | Refills: 0 | COMMUNITY

## 2018-01-03 RX ORDER — ATORVASTATIN CALCIUM 80 MG/1
40 TABLET, FILM COATED ORAL AT BEDTIME
Qty: 0 | Refills: 0 | Status: DISCONTINUED | OUTPATIENT
Start: 2018-01-03 | End: 2018-01-03

## 2018-01-03 RX ORDER — CARVEDILOL PHOSPHATE 80 MG/1
25 CAPSULE, EXTENDED RELEASE ORAL EVERY 12 HOURS
Qty: 0 | Refills: 0 | Status: DISCONTINUED | OUTPATIENT
Start: 2018-01-03 | End: 2018-01-03

## 2018-01-03 RX ORDER — IPRATROPIUM/ALBUTEROL SULFATE 18-103MCG
3 AEROSOL WITH ADAPTER (GRAM) INHALATION EVERY 6 HOURS
Qty: 0 | Refills: 0 | Status: DISCONTINUED | OUTPATIENT
Start: 2018-01-03 | End: 2018-01-03

## 2018-01-03 RX ORDER — LOSARTAN POTASSIUM 100 MG/1
25 TABLET, FILM COATED ORAL DAILY
Qty: 0 | Refills: 0 | Status: DISCONTINUED | OUTPATIENT
Start: 2018-01-03 | End: 2018-01-03

## 2018-01-03 RX ORDER — FLUTICASONE FUROATE AND VILANTEROL TRIFENATATE 100; 25 UG/1; UG/1
1 POWDER RESPIRATORY (INHALATION)
Qty: 0 | Refills: 0 | COMMUNITY

## 2018-01-03 RX ORDER — INSULIN LISPRO 100/ML
VIAL (ML) SUBCUTANEOUS
Qty: 0 | Refills: 0 | Status: DISCONTINUED | OUTPATIENT
Start: 2018-01-03 | End: 2018-01-03

## 2018-01-03 RX ORDER — POTASSIUM CHLORIDE 20 MEQ
20 PACKET (EA) ORAL ONCE
Qty: 0 | Refills: 0 | Status: DISCONTINUED | OUTPATIENT
Start: 2018-01-03 | End: 2018-01-03

## 2018-01-03 RX ORDER — INSULIN LISPRO 100/ML
VIAL (ML) SUBCUTANEOUS AT BEDTIME
Qty: 0 | Refills: 0 | Status: DISCONTINUED | OUTPATIENT
Start: 2018-01-03 | End: 2018-01-03

## 2018-01-03 RX ORDER — ALPRAZOLAM 0.25 MG
0.5 TABLET ORAL
Qty: 0 | Refills: 0 | Status: DISCONTINUED | OUTPATIENT
Start: 2018-01-03 | End: 2018-01-03

## 2018-01-03 RX ADMIN — Medication 60 MILLIGRAM(S): at 10:57

## 2018-01-03 RX ADMIN — Medication 3 MILLILITER(S): at 17:24

## 2018-01-03 RX ADMIN — Medication 4: at 18:16

## 2018-01-03 RX ADMIN — Medication 324 MILLIGRAM(S): at 10:57

## 2018-01-03 RX ADMIN — Medication 40 MILLIGRAM(S): at 17:24

## 2018-01-03 RX ADMIN — Medication 3 MILLILITER(S): at 10:58

## 2018-01-03 NOTE — ED ADULT NURSE REASSESSMENT NOTE - NS ED NURSE REASSESS COMMENT FT1
report was given to 2 DSU RN Petra for assigned bed location 264D. spoke with pt about bed assignment. pt standing in room holding pressure on IV site, pt reports "I took the IV out, I'm leaving". pt states "this took too long, I wanted treatment today, I don't want to wait any longer". pt aware that he received bed assignment. pt states "Its okay I have to leave". MD Puckett and Cheri made aware. MARU Ruffin, receiving ER RN from Cindy MAHONEY, made aware. Charge desk made aware. Cardiology MD at bedside for kaitlin.

## 2018-01-03 NOTE — ED ADULT NURSE REASSESSMENT NOTE - NS ED NURSE REASSESS COMMENT FT1
pt pulled out iv line and states he wants to go home now because "i'm fine"; pt advised he has a bed; cardiologist came to see pt for consult and is speaking with pt; attempting to contact inpt team to speak to pt re: irina;

## 2018-01-03 NOTE — DISCHARGE NOTE ADULT - OTHER SIGNIFICANT FINDINGS
Notified by RN that patient wants to leave hospital against Medical Advice.  Went to see patient  and was told that patient left hospital . RN reported that patient stated that chest pain resolved and wanted to go home.  51 yo male presenting with chest pain, cough and dyspnea which started last week. He reports that he feels short of breath, has been using albuterol every 2 hours and is not getting really better. No fevers or chills. No GI  or neuro complaints except for some annxiety. Patient says that he had a respiratory arrest on December 1 2017, and was intubated and taken to [facility]. At that time he was diagnosed with bronchospasm/allergy  On admission patient found with elevated D- dimers,  VQ scan shows low probability for PE and + human [place] pneumovirus, Cardiac enzymes x1 negative. CXR clear lungs.  Dr Lorenzana notified that patient left hospital AMA. Patient alert and oriented x3.

## 2018-01-03 NOTE — H&P ADULT - HISTORY OF PRESENT ILLNESS
51 yo male presenting with chest pain, cough and dyspnea which started last week. He reports that he feels short of breath, has been using albuterol every 2 hours and is not getting really better. Nofevers or chills. No GI  or neuro complaints except for some annxiety.   	Patient says that he had a respiratory arrest on December 1 2017, and was intubated and taken to [facility]. At that time he was diagnosed with bronchospasm/allergy.

## 2018-01-03 NOTE — ED ADULT NURSE REASSESSMENT NOTE - NS ED NURSE REASSESS COMMENT FT1
pt left ED with clothes on taking all belongings; NP here to see pt and aware; calling admitting phys

## 2018-01-03 NOTE — CONSULT NOTE ADULT - SUBJECTIVE AND OBJECTIVE BOX
CHIEF COMPLAINT:  Chest pain     HISTORY OF PRESENT ILLNESS:  52M with history of mild intermittent asthma, admitted with c/o 1week increasing wheezing, dyspnea, rhinorrhea. Denies fever, chills. Patient had asthma attack after exposure to mowed grass 2 weeks PTA with fulminant resp failure: patient syncopized due to inability to breath and was intubated in field by EMS. At [facility], he self extubated after a few hours and states he went home the same day. He has history of seasonal wheezing many years and was treated with bronchodilators and prednisone in Jan 2017.  A CTA in JUne 2016 with small bilaterl effusions and compressive/linear atelectasis. Complete PFT's in 2/2017 were normal, without evidence of obstruction.    In ER was noted to be tight with bilateral wheezing. PCR was + for HmPV. He receieved solumedrol X 1 and felt better  A V?Q was ordered in ER and had matched defects (low prob)      PAST MEDICAL & SURGICAL HISTORY:  Respiratory arrest: december 1st  Intracranial hemorrhage: 2008  CAD (coronary artery disease): 2009; stent  HTN (hypertension)  History of coronary artery stent placement          MEDICATIONS:  aspirin enteric coated 81 milliGRAM(s) Oral daily  carvedilol 25 milliGRAM(s) Oral every 12 hours  clopidogrel Tablet 75 milliGRAM(s) Oral daily  hydrochlorothiazide 25 milliGRAM(s) Oral daily  losartan 25 milliGRAM(s) Oral daily      ALBUTerol/ipratropium for Nebulization 3 milliLiter(s) Nebulizer every 6 hours  buDESOnide   0.5 milliGRAM(s) Respule 0.5 milliGRAM(s) Inhalation two times a day        atorvastatin 40 milliGRAM(s) Oral at bedtime  dextrose 50% Injectable 12.5 Gram(s) IV Push once  dextrose 50% Injectable 25 Gram(s) IV Push once  dextrose 50% Injectable 25 Gram(s) IV Push once  dextrose Gel 1 Dose(s) Oral once PRN  glucagon  Injectable 1 milliGRAM(s) IntraMuscular once PRN  insulin lispro (HumaLOG) corrective regimen sliding scale   SubCutaneous three times a day before meals  insulin lispro (HumaLOG) corrective regimen sliding scale   SubCutaneous at bedtime  methylPREDNISolone sodium succinate Injectable 40 milliGRAM(s) IV Push every 6 hours    dextrose 5%. 1000 milliLiter(s) IV Continuous <Continuous>  potassium chloride    Tablet ER 20 milliEquivalent(s) Oral once      FAMILY HISTORY:  Family history of hypertension in mother: Mother  Family history of meningitis (Sibling): Brother, death at age 49 from complications of infection (June 2015)      SOCIAL HISTORY:    [ ] Non-smoker  [ ] Smoker  [ ] Alcohol    Allergies    contrast dye (Short breath)  No Known Drug Allergies    Intolerances    	    REVIEW OF SYSTEMS:  CONSTITUTIONAL: No fever, weight loss, or fatigue  EYES: No eye pain, visual disturbances, or discharge  ENMT:  No difficulty hearing, tinnitus, vertigo; No sinus or throat pain  NECK: No pain or stiffness  RESPIRATORY: No cough, wheezing, chills or hemoptysis; No Shortness of Breath  CARDIOVASCULAR: No chest pain, palpitations, passing out, dizziness, or leg swelling  GASTROINTESTINAL: No abdominal or epigastric pain. No nausea, vomiting, or hematemesis; No diarrhea or constipation. No melena or hematochezia.  GENITOURINARY: No dysuria, frequency, hematuria, or incontinence  NEUROLOGICAL: No headaches, memory loss, loss of strength, numbness, or tremors  SKIN: No itching, burning, rashes, or lesions   LYMPH Nodes: No enlarged glands  ENDOCRINE: No heat or cold intolerance; No hair loss  MUSCULOSKELETAL: No joint pain or swelling; No muscle, back, or extremity pain  PSYCHIATRIC: No depression, anxiety, mood swings, or difficulty sleeping  HEME/LYMPH: No easy bruising, or bleeding gums  ALLERY AND IMMUNOLOGIC: No hives or eczema	    [ ] All others negative	  [ ] Unable to obtain    PHYSICAL EXAM:  T(C): 36.5 (01-03-18 @ 18:40), Max: 37.1 (01-03-18 @ 09:45)  HR: 79 (01-03-18 @ 18:40) (79 - 90)  BP: 128/98 (01-03-18 @ 18:40) (128/98 - 157/99)  RR: 20 (01-03-18 @ 18:40) (20 - 22)  SpO2: 98% (01-03-18 @ 18:40) (96% - 98%)  Wt(kg): --  I&O's Summary      Appearance: Normal	  HEENT:   Normal oral mucosa, PERRL, EOMI	  Lymphatic: No lymphadenopathy  Cardiovascular: Normal S1 S2, No JVD, No murmurs, No edema  Respiratory: Lungs clear to auscultation	  Psychiatry: A & O x 3, Mood & affect appropriate  Gastrointestinal:  Soft, Non-tender, + BS	  Skin: No rashes, No ecchymoses, No cyanosis	  Neurologic: Non-focal  Extremities: Normal range of motion, No clubbing, cyanosis or edema  Vascular: Peripheral pulses palpable 2+ bilaterally    TELEMETRY: 	    ECG:  	  RADIOLOGY:    OTHER: 	  	  LABS:	 	    CARDIAC MARKERS:                                  15.5   6.5   )-----------( 173      ( 03 Jan 2018 10:13 )             44.2     01-03    137  |  97  |  26<H>  ----------------------------<  136<H>  3.3<L>   |  26  |  1.54<H>    Ca    9.7      03 Jan 2018 10:13    TPro  7.8  /  Alb  4.1  /  TBili  0.3  /  DBili  x   /  AST  29  /  ALT  21  /  AlkPhos  49  01-03    proBNP: Serum Pro-Brain Natriuretic Peptide: 228 pg/mL (01-03 @ 10:13)    Lipid Profile:   HgA1c:   TSH: CHIEF COMPLAINT:  Chest pain     HISTORY OF PRESENT ILLNESS:  52M with history of mild intermittent asthma, admitted with c/o 1week increasing wheezing, dyspnea, rhinorrhea. Denies fever, chills. Patient had asthma attack after exposure to mowed grass 2 weeks PTA with fulminant resp failure: patient syncopized due to inability to breath and was intubated in field by EMS. At [facility], he self extubated after a few hours and states he went home the same day. He has history of seasonal wheezing many years and was treated with bronchodilators and prednisone in 2017.  A CTA in 2016 with small bilaterl effusions and compressive/linear atelectasis. Complete PFT's in 2017 were normal, without evidence of obstruction.    In ER was noted to be tight with bilateral wheezing. PCR was + for HmPV. He receieved solumedrol X 1 and felt better  A V?Q was ordered in ER and had matched defects (low prob)    Currently states to be doing better and wants to leave the hospital.     PAST MEDICAL & SURGICAL HISTORY:  Respiratory arrest:   Intracranial hemorrhage:   CAD (coronary artery disease): ; stent  HTN (hypertension)  History of coronary artery stent placement          MEDICATIONS:  aspirin enteric coated 81 milliGRAM(s) Oral daily  carvedilol 25 milliGRAM(s) Oral every 12 hours  clopidogrel Tablet 75 milliGRAM(s) Oral daily  hydrochlorothiazide 25 milliGRAM(s) Oral daily  losartan 25 milliGRAM(s) Oral daily      ALBUTerol/ipratropium for Nebulization 3 milliLiter(s) Nebulizer every 6 hours  buDESOnide   0.5 milliGRAM(s) Respule 0.5 milliGRAM(s) Inhalation two times a day        atorvastatin 40 milliGRAM(s) Oral at bedtime  dextrose 50% Injectable 12.5 Gram(s) IV Push once  dextrose 50% Injectable 25 Gram(s) IV Push once  dextrose 50% Injectable 25 Gram(s) IV Push once  dextrose Gel 1 Dose(s) Oral once PRN  glucagon  Injectable 1 milliGRAM(s) IntraMuscular once PRN  insulin lispro (HumaLOG) corrective regimen sliding scale   SubCutaneous three times a day before meals  insulin lispro (HumaLOG) corrective regimen sliding scale   SubCutaneous at bedtime  methylPREDNISolone sodium succinate Injectable 40 milliGRAM(s) IV Push every 6 hours    dextrose 5%. 1000 milliLiter(s) IV Continuous <Continuous>  potassium chloride    Tablet ER 20 milliEquivalent(s) Oral once      FAMILY HISTORY:  Family history of hypertension in mother: Mother  Family history of meningitis (Sibling): Brother, death at age 49 from complications of infection (2015)      SOCIAL HISTORY:    [ ] Non-smoker  [ ] Smoker  [ ] Alcohol    Allergies    contrast dye (Short breath)  No Known Drug Allergies    Intolerances    	    REVIEW OF SYSTEMS:  CONSTITUTIONAL: No fever, weight loss, or fatigue  EYES: No eye pain, visual disturbances, or discharge  ENMT:  No difficulty hearing, tinnitus, vertigo; No sinus or throat pain  NECK: No pain or stiffness  RESPIRATORY: + cough, wheezing, chills , NOhemoptysis; + Shortness of Breath  CARDIOVASCULAR: + chest pain, No palpitations, passing out, dizziness, or leg swelling  GASTROINTESTINAL: No abdominal or epigastric pain. No nausea, vomiting, or hematemesis; No diarrhea or constipation. No melena or hematochezia.  GENITOURINARY: No dysuria, frequency, hematuria, or incontinence  NEUROLOGICAL: No headaches, memory loss, loss of strength, numbness, or tremors  SKIN: No itching, burning, rashes, or lesions   LYMPH Nodes: No enlarged glands  ENDOCRINE: No heat or cold intolerance; No hair loss  MUSCULOSKELETAL: No joint pain or swelling; No muscle, back, or extremity pain  PSYCHIATRIC: No depression, anxiety, mood swings, or difficulty sleeping  HEME/LYMPH: No easy bruising, or bleeding gums  ALLERGY AND IMMUNOLOGIC: No hives or eczema	    [ ] All others negative	  [ ] Unable to obtain    PHYSICAL EXAM:  T(C): 36.5 (18 @ 18:40), Max: 37.1 (18 @ 09:45)  HR: 79 (18 @ 18:40) (79 - 90)  BP: 128/98 (18 @ 18:40) (128/98 - 157/99)  RR: 20 (18 @ 18:40) (20 - 22)  SpO2: 98% (18 @ 18:40) (96% - 98%)  Wt(kg): --  I&O's Summary      Appearance: Normal	  HEENT:   Normal oral mucosa, PERRL, EOMI	  Lymphatic: No lymphadenopathy  Cardiovascular: Normal S1 S2, No JVD, No murmurs, No edema  Respiratory: +diffuse bronchospasm 	  Psychiatry: A & O x 3, Mood & affect appropriate  Gastrointestinal:  Soft, Non-tender, + BS	  Skin: No rashes, No ecchymoses, No cyanosis	  Neurologic: Non-focal  Extremities: Normal range of motion, No clubbing, cyanosis or edema  Vascular: Peripheral pulses palpable 2+ bilaterally    TELEMETRY: 	    ECG:  	  RADIOLOGY:   < from: Xray Chest 2 Views PA/Lat (18 @ 10:38) >    EXAM:  XR CHEST PA LAT 2V                            PROCEDURE DATE:  2018            INTERPRETATION:  HISTORY: Chest pain, cough, wheezing. Rule out pneumonia.    TECHNIQUE: PA and lateral views of the chest were obtained.    COMPARISON: 2017    FINDINGS:  The cardiac silhouette is normal in size. There are no focal   consolidations or pleural effusions. The hilar and mediastinal structures   appear unremarkable. The osseous structures are intact.    IMPRESSION: Clear lungs.                    SAMAN MENDOZA M.D., ATTENDING RADIOLOGIST  This document has been electronically signed. Jacobo  3 2018 10:48AM        < from: NM Pulmonary Ventilation/Perfusion Scan (18 @ 13:40) >  EXAM:  NM PULM VENTILATION PERFUS IMG                                PROCEDURE DATE:  2018          INTERPRETATION:  RADIOPHARMACEUTICAL: 55 mCi Tc-99m-DTPA via inhalation;   6.5 mCi Tc-99m-MAA, I.V.    CLINICAL INFORMATION: 52 year old patient with dyspnea and chest pain;   referred to evaluate for pulmonary embolism.    TECHNIQUE:  Ventilation and perfusion images of the lungs were obtained   following administration of Tc-99m-DTPA and Tc-99m-MAA. Images were   obtained in the anterior,posterior, both lateral, and all 4 oblique   projections. The study was interpreted in conjunction with a chest   radiograph of 1/3/2018.    COMPARISON: No prior lung scan available for comparison.    FINDINGS: There are segmental matched defects involving the posterior and   lateral basilar segments of the left lower lobe with no corresponding   opacity on the contemporaneous x-ray. There are also subsegmental   moderate matched defects involving the posterior segment of the right   upper lobe and apicoposterior segment of the left upper lobe. No mismatch   perfusion defect is identified.    IMPRESSION: Abnormal ventilation/perfusion lung scan.     Low probability of pulmonary embolus.          < from: Cardiac Cath Lab - Adult (17 @ 21:55) >  [facility]  Department of Cardiology  [address] [place] [phone]  Cath Lab Report -- Comprehensive Report  Patient: LD VALENCIA  Study date: 2017  Account number: [id number]  MR number: [id number]  : 1965  Gender: Male  Race: W  Case Physician(s):  Jairon Holguin M.D.  Referring Physician:  Luc Lynn M.D.  INDICATIONS: Unstable angina - CCS4.  HISTORY: The patient has a history of coronary artery disease. The patient  hashypertension and medication-treated dyslipidemia.  PROCEDURE:  --  Left heart catheterization with ventriculography.  --  Left coronary angiography.  --  Right coronary angiography.  TECHNIQUE: The risks and alternatives of the procedures and conscious  sedation were explained to the patient and informed consent was obtained.  Cardiac catheterization performed electively.  Local anesthetic given. Right radial artery access. A 6FR PRELUDE KIT was  inserted in the vessel. Left heart catheterization. Ventriculography was  performed. A 5FR FR4.0 EXPO catheter was utilized. Left coronary artery  angiography. The vessel was injected utilizing a 5FR FL3.5 EXPO catheter.  Right coronary artery angiography. The vessel was injected utilizing a 5FR  FR4.0 EXPO catheter. RADIATION EXPOSURE: 1.1 min.  CONTRAST GIVEN: Omnipaque 55 ml.  MEDICATIONS GIVEN: Midazolam, 1 mg, IV. Fentanyl, 25 mcg, IV. Verapamil  (Isoptin, Calan, Covera), 2.5 mg, IA. Heparin, 3000 units, IA.  VENTRICLES: Global left ventricular function was moderately depressed. EF  estimated was 40 %.  CORONARY VESSELS: The coronary circulation is right dominant.  LM:   --  LM: Normal.  LAD:   --  Proximal LAD: There was a 50 % stenosis.  CX:   --  Circumflex: Normal.  RCA:   --  Mid RCA: There was a 40 % stenosis.  --  Distal RCA: There was a 50 % stenosis.  COMPLICATIONS: There were no complications.  DIAGNOSTIC RECOMMENDATIONS: The patient should continue with the present  medications.  Prepared and signed by  Jairon Holguin M.D.  Signed 2017 12:20:13  HEMODYNAMIC TABLES  Pressures:  Baseline/ Room Air  Pressures:  - HR: 78  Pressures:  - Rhythm:  Pressures:  -- Aortic Pressure (S/D/M): --/--/99  Pressures:  -- Left Ventricle (s/edp): 157/39/--  Outputs:  Baseline/ Room Air  Outputs:  -- CALCULATIONS[age]  Outputs:  -- CALCULATIONS: Body Surface Area: 2.05  Outputs:  -- CALCULATIONS: Height in cm: 175.00  Outputs:  -- CALCULATIONS: Sex: Male  Outputs:  -- CALCULATIONS: Weight in k.40    < end of copied text >      OTHER: 	  	  LABS:	 	    CARDIAC MARKERS:                                  15.5   6.5   )-----------( 173      ( 2018 10:13 )             44.2         137  |  97  |  26<H>  ----------------------------<  136<H>  3.3<L>   |  26  |  1.54<H>    Ca    9.7      2018 10:13    TPro  7.8  /  Alb  4.1  /  TBili  0.3  /  DBili  x   /  AST  29  /  ALT  21  /  AlkPhos  49      proBNP: Serum Pro-Brain Natriuretic Peptide: 228 pg/mL ( @ 10:13)    Lipid Profile:   HgA1c:   TSH:

## 2018-01-03 NOTE — CHART NOTE - NSCHARTNOTEFT_GEN_A_CORE
Notified by RN that patient wants to leave hospital against Medical Advice.  Went to see patient  and was told that patient left hospital . RN reported that patient stated that chest pain resolved and wanted to go home.  53 yo male presenting with chest pain, cough and dyspnea which started last week. He reports that he feels short of breath, has been using albuterol every 2 hours and is not getting really better. No fevers or chills. No GI  or neuro complaints except for some annxiety. Patient says that he had a respiratory arrest on December 1 2017, and was intubated and taken to [facility]. At that time he was diagnosed with bronchospasm/allergy  On admission patient found with elevated D- dimers,  VQ scan shows low probability for PE and + human [place] pneumovirus, Cardiac enzymes x1 negative. CXR clear lungs.  Dr Lorenzana notified that patient left hospital AMA. Patient alert and oriented x3.    Mason Shaver, ANP-C  Department of Medicine  [id number] Notified by RN that patient wants to leave hospital against Medical Advice.  Went to see patient  and was told that patient left hospital, as a result was not able to assess patient . RN reported that patient stated that chest pain resolved and wanted to go home.  53 yo male presenting with chest pain, cough and dyspnea which started last week. He reports that he feels short of breath, has been using albuterol every 2 hours and is not getting really better. No fevers or chills. No GI  or neuro complaints except for some annxiety. Patient says that he had a respiratory arrest on December 1 2017, and was intubated and taken to [facility]. At that time he was diagnosed with bronchospasm/allergy  On admission patient found with elevated D- dimers,  VQ scan shows low probability for PE and + human [place] pneumovirus, Cardiac enzymes x1 negative. CXR clear lungs.  Dr Lorenzana notified that patient left hospital AMA. Patient alert and oriented x3.    Mason Shaver, ANP-C  Department of Medicine  [id number]

## 2018-01-03 NOTE — ED PROVIDER NOTE - CARE PLAN
Principal Discharge DX:	Shortness of breath  Secondary Diagnosis:	Bronchospasm  Secondary Diagnosis:	Chest pain on breathing

## 2018-01-03 NOTE — ED ADULT NURSE NOTE - OBJECTIVE STATEMENT
51yo M pt AxOx3 ambulatory to ED c/o CP x2days. PMHx STEMI, stentx1 and resp failure and intubation, OCD, anxiety. Pt states pain is on L-side of chest, nonradiating and is similar to when he had MI. Pt is diaphoretic, anxious 53yo M pt AxOx3 ambulatory to ED c/o CP x2days. PMHx STEMI, stentx1 and resp failure and intubation, OCD, anxiety. Pt states pain is on L-side of chest, nonradiating and is similar to when he had MI, pain wosened today. Pt is diaphoretic and anxious but cooperative. Pt states he has been taking DuoNebs q1-2hrs. NAD noted. Wheezing noted throughout bilaterally, resp even, unlabored. No edema noted to lower extremities. Pt given SoluMedrol and DuoNeb and ASA as per MD. #18G RAC, labs drawn and sent. EKG in triage. CM in place - 90s NSR. MD at bedside for eval.

## 2018-01-03 NOTE — DISCHARGE NOTE ADULT - PATIENT PORTAL LINK FT
“You can access the [facility] Patient Portal, offered by [facility], by registering with the following website: [email or web address]”

## 2018-01-03 NOTE — H&P ADULT - FAMILY HISTORY
Family history of hypertension in mother, Mother     Sibling  Still living? No  Family history of meningitis, Age at diagnosis: Age Unknown

## 2018-01-03 NOTE — ED PROVIDER NOTE - ATTENDING CONTRIBUTION TO CARE
Attending MD Montano:  I personally have seen and examined this patient.  Resident note reviewed and agree on plan of care and except where noted.  See HPI, PE, and MDM for details.

## 2018-01-03 NOTE — DISCHARGE NOTE ADULT - CARE PLAN
Principal Discharge DX:	Shortness of breath  Goal:	To return to hospital if experience chest discomfort  Instructions for follow-up, activity and diet:	Follow with Primary doctor

## 2018-01-03 NOTE — H&P ADULT - NSHPPHYSICALEXAM_GEN_ALL_CORE
Vital Signs Last 24 Hrs  T(C): 36.5 (03 Jan 2018 18:40), Max: 37.1 (03 Jan 2018 09:45)  T(F): 97.7 (03 Jan 2018 18:40), Max: 98.8 (03 Jan 2018 09:45)  HR: 79 (03 Jan 2018 18:40) (79 - 90)  BP: 128/98 (03 Jan 2018 18:40) (128/98 - 157/99)  BP(mean): --  RR: 20 (03 Jan 2018 18:40) (20 - 22)  SpO2: 98% (03 Jan 2018 18:40) (96% - 98%)    PHYSICAL EXAM:  GENERAL: NAD, well-developed, anxious  HEAD:  Atraumatic, Normocephalic  EYES: EOMI, PERRLA, conjunctiva and sclera clear  NECK: Supple, No JVD  CHEST/LUNG: b/l wheeze  HEART: Regular rate and rhythm; No murmurs, rubs, or gallops  ABDOMEN: Soft, Nontender, Nondistended; Bowel sounds present  EXTREMITIES:  2+ Peripheral Pulses, No clubbing, cyanosis, or edema  PSYCH: AAOx3  NEUROLOGY: non-focal  SKIN: No rashes or lesions

## 2018-01-03 NOTE — ED PROVIDER NOTE - PHYSICAL EXAMINATION
Attending MD Montano: A & O x 3, NAD, EOMI b/l, PERRL b/l; lungs with b/l diffuse wheezes, heart with reg rhythm without murmur; abdomen soft NTND; extremities with no edema; affect appropriate. neuro exam non focal with no motor or sensory deficits.

## 2018-01-03 NOTE — ED PROVIDER NOTE - CONSTITUTIONAL, MLM
normal... Well appearing, well nourished, awake, alert, oriented to person, place, time/situation and in mild distress, anxious, diaphoretic

## 2018-01-03 NOTE — H&P ADULT - NSHPREVIEWOFSYSTEMS_GEN_ALL_CORE
Constitutional: No fever, fatigue or weight loss.  Skin: No rash.  Eyes: No recent vision problems or eye pain.  ENT: No congestion, ear pain, or sore throat.  Endocrine: No thyroid problems.  Cardiovascular: yes chest pain  Respiratory: yes cought, sob, wheeze.  Gastrointestinal: No abdominal pain, nausea, vomiting, or diarrhea.  Genitourinary: No dysuria.  Musculoskeletal: No joint swelling.  Neurologic: No headache.

## 2018-01-03 NOTE — DISCHARGE NOTE ADULT - HOSPITAL COURSE
Notified by RN that patient wants to leave hospital against Medical Advice.  Went to see patient  and was told that patient left hospital . RN reported that patient stated that chest pain resolved and wanted to go home.  51 yo male presenting with chest pain, cough and dyspnea which started last week. He reports that he feels short of breath, has been using albuterol every 2 hours and is not getting really better. No fevers or chills. No GI  or neuro complaints except for some annxiety. Patient says that he had a respiratory arrest on December 1 2017, and was intubated and taken to [facility]. At that time he was diagnosed with bronchospasm/allergy  On admission patient found with elevated D- dimers,  VQ scan shows low probability for PE and + human [place] pneumovirus, Cardiac enzymes x1 negative. CXR clear lungs.  Dr Lorenzana notified that patient left hospital AMA. Patient alert and oriented x3.

## 2018-01-03 NOTE — CONSULT NOTE ADULT - SUBJECTIVE AND OBJECTIVE BOX
PULMONARY CONSULT  Frandy Springer MD  [phone]    Initial HPI on admission:  HPI:    52M with history of mild intermittent asthma, admitted with c/o 1week increasing wheezing, dyspnea, rhinorrhea. Denies fever, chills. Patient had asthma attack after exposure to mowed grass 2 weeks PTA with fulminant resp failure: patient syncopized due to inability to breath and was intubated in field by EMS. At [facility], he self extubated after a few hours and states he went home the same day. He has history of seasonal wheezing many years and was treated with bronchodilators and prednisone in Jan 2017.  A CTA in JUne 2016 with small bilaterl effusions and compressive/linear atelectasis. Complete PFT's in 2/2017 were normal, without evidence of obstruction.    In ER was noted to be tight with bilateral wheezing. PCR was + for HmPV. He receieved solumedrol X 1 and felt better  A V?Q was ordered in ER and had matched defects (low prob)    PAST MEDICAL & SURGICAL HISTORY:  Respiratory arrest: december 1st due to fulminant asthma: intubated in field  Intracranial hemorrhage: 2008  CAD (coronary artery disease): 2009; stent  HTN (hypertension)  History of coronary artery stent placement    Allergies: Grass, seasonal, IV contrast    contrast dye (Short breath)  No Known Drug Allergies    Intolerances      FAMILY HISTORY:  Family history of hypertension in mother: Mother  Family history of meningitis (Sibling): Brother, death at age 49 from complications of infection (June 2015)    REVIEW OF SYSTEMS      General:	    Skin/Breast: nl	  	  Ophthalmologic: nl  	  ENMT:	nl    Respiratory and Thorax: see HPI  	  Cardiovascular:	nl    Gastrointestinal:	nl    Genitourinary:nl    Musculoskeletal:	 nl    Neurologicalnl    Psychiatric: nl	    Hematology/Lymphatics:	 nl    Endocrin  nl    Allergic/Immunologic: see HPI	  Social history:       Medications:  MEDICATIONS  (STANDING):  ALBUTerol/ipratropium for Nebulization 3 milliLiter(s) Nebulizer every 6 hours  buDESOnide   0.5 milliGRAM(s) Respule 0.5 milliGRAM(s) Inhalation two times a day  methylPREDNISolone sodium succinate Injectable 40 milliGRAM(s) IV Push every 6 hours    MEDICATIONS  (PRN):    Vital Signs Last 24 Hrs  T(C): 37.1 (03 Jan 2018 09:45), Max: 37.1 (03 Jan 2018 09:45)  T(F): 98.8 (03 Jan 2018 09:45), Max: 98.8 (03 Jan 2018 09:45)  HR: 90 (03 Jan 2018 09:45) (90 - 90)  BP: 157/99 (03 Jan 2018 09:45) (157/99 - 157/99)  BP(mean): --  RR: 22 (03 Jan 2018 09:45) (22 - 22)  SpO2: 96% (03 Jan 2018 09:45) (96% - 96%)        LABS:                        15.5   6.5   )-----------( 173      ( 03 Jan 2018 10:13 )             44.2     01-03    137  |  97  |  26<H>  ----------------------------<  136<H>  3.3<L>   |  26  |  1.54<H>    Ca    9.7      03 Jan 2018 10:13    TPro  7.8  /  Alb  4.1  /  TBili  0.3  /  DBili  x   /  AST  29  /  ALT  21  /  AlkPhos  49  01-03      Serum Pro-Brain Natriuretic Peptide: 228 pg/mL (01-03-18 @ 10:13)      CULTURES:        Physical Examination:    General: Non toxic, No acute distress.      HEENT: Pupils equal, reactive to light.  Symmetric.    PULM: Bilateral expiratory wheeze with limited insp capacity and poor air movement    CVS: Regular rate and rhythm, no murmurs, rubs, or gallops    ABD: Soft, nondistended, nontender, normoactive bowel sounds, no masses    EXT: No edema, nontender    SKIN: Warm and well perfused, no rashes noted.    NEURO: Alert, oriented, interactive, nonfocal    RADIOLOGY REVIEWED PERSONALLY  CXR: JEVON    CT chest:    V/Q: low probability

## 2018-01-03 NOTE — ED PROVIDER NOTE - MEDICAL DECISION MAKING DETAILS
Attending MD Montano: 52M with CAD, HTN presenting with cough and dyspnea x 1 week, also with syncope. Exam notable for diffuse wheezes on exam, acute RAD could be viral vs. inflammatory, will obtain CXR to ro infiltrate. BNP to r/o CHF. having pleuritic chest pain as well and recent hospitalization, PE a consideration, will see d-dimer, doubt ACS but will send Joe. Admit to tele given increased wob and syncope with cardiac history.

## 2018-01-03 NOTE — ED PROVIDER NOTE - OBJECTIVE STATEMENT
53 yo male presenting with chest pain, cough and dyspnea which started last week. He reports that he feels short of breath, has been using albuterol every 2 hours and is not getting really better. Nofevers or chills. No GI  or neuro complaints except for some annxiety.   Patient says that he had a respiratoy arrest on December 1 2017, and was intubated and taken to [facility]. At that time he was diagnosed with bronchospasm/allergy. 51 yo male presenting with chest pain, cough and dyspnea which started last week. He reports that he feels short of breath, has been using albuterol every 2 hours and is not getting really better. Nofevers or chills. No GI  or neuro complaints except for some annxiety.   Patient says that he had a respiratoy arrest on December 1 2017, and was intubated and taken to [facility]. At that time he was diagnosed with bronchospasm/allergy.      SHARITA Banuelos 51 yo male presenting with chest pain, cough and dyspnea which started last week. He reports that he feels short of breath, has been using albuterol every 2 hours and is not getting really better. Nofevers or chills. No GI  or neuro complaints except for some annxiety.   Patient says that he had a respiratory arrest on December 1 2017, and was intubated and taken to [facility]. At that time he was diagnosed with bronchospasm/allergy.      SHARITA Banuelos

## 2018-01-03 NOTE — CONSULT NOTE ADULT - PROBLEM SELECTOR RECOMMENDATION 9
Typical and atypical features  Unclear if during his respiratory failure he also had cardiac arrest. Strongly advised cardiac follow up and workup, however, he adamantly refuses and wants to leave. He has a strong history of non compliance and poor follow up.

## 2018-01-03 NOTE — DISCHARGE NOTE ADULT - ADDITIONAL INSTRUCTIONS
Patient left hospital against Medical Advice. Writer did not see patient, was notified by RN that patient left hospital  DR Lorenzana notified that patient left A

## 2018-01-03 NOTE — DISCHARGE NOTE ADULT - CARE PROVIDER_API CALL
Luc Lynn (), Cardiology; Internal Medicine  [address]  [address]  [place] [address]  Phone: [phone]  Fax: [phone]    Dhaval Curran), Internal Medicine; Pulmonary Disease  [address]  [place] [address]  Phone: [phone]  Fax: [phone]

## 2018-01-03 NOTE — DISCHARGE NOTE ADULT - MEDICATION SUMMARY - MEDICATIONS TO TAKE
I will START or STAY ON the medications listed below when I get home from the hospital:    aspirin 81 mg oral delayed release tablet  -- 1 tab(s) by mouth once a day (in the evening)  -- Indication: For CAD (coronary artery disease)    losartan 25 mg oral tablet  -- 1 tab(s) by mouth once a day  -- Indication: For HTN    Crestor 20 mg oral tablet  -- 1 tab(s) by mouth once a day (at bedtime)  -- Indication: For Cholesterol    Plavix 75 mg oral tablet  -- 1 tab(s) by mouth once a day  -- Indication: For CAD (coronary artery disease)    benzonatate 200 mg oral capsule  -- 1 cap(s) by mouth 3 times a day, As Needed  -- Indication: For Cough    carvedilol 25 mg oral tablet  -- 1 tab(s) by mouth every 12 hours  -- Indication: For CAD (coronary artery disease)    Bevespi Aerosphere 9 mcg-4.8 mcg/inh inhalation aerosol  -- 1 puff(s) inhaled 2 times a day  -- Indication: For Bronchospasm    albuterol 2.5 mg/3 mL (0.083%) inhalation solution  -- 3 milliliter(s) inhaled , As Needed  -- Indication: For Bronchospasm    ipratropium 500 mcg/2.5 mL inhalation solution  -- 2.5 milliliter(s) inhaled , As Needed  -- Indication: For Bronchospasm    hydroCHLOROthiazide 25 mg oral tablet  -- 1 tab(s) by mouth once a day (in the morning)  -- Indication: For HTN    levoFLOXacin 500 mg oral tablet  -- 1 tab(s) by mouth every 24 hours (for 10 days)  -- Indication: For Antibiotics    Vitamin D3  -- 1 cap(s) by mouth once a day  -- Indication: For Supplement

## 2018-01-03 NOTE — CONSULT NOTE ADULT - ASSESSMENT
Problems:    Status asthmaticus due to human metapneumovirus infection  Incr D-dimer due to infection: no suspicion of VTE  Mild intermittent asthma  CAD s/p PCI    REC    Begin solumedrol 40 q6  Duoneb qid  Pulmicort .5 bid  O2 sat 94% on RA - monitor oxygen  Observe off antibiotics
51 yo male with known CAD and recent ?cardiac arrest and respiratory failure s.p intubation presents with chest pain and shortness of breath

## 2018-01-03 NOTE — ED PROVIDER NOTE - PMH
CAD (coronary artery disease)  2009; stent  HTN (hypertension)    Intracranial hemorrhage  2008  Respiratory arrest  december 1st

## 2018-01-03 NOTE — ED ADULT NURSE REASSESSMENT NOTE - NS ED NURSE REASSESS COMMENT FT1
Pt is well in appearance. NAD noted. [facility].

## 2018-01-03 NOTE — H&P ADULT - ASSESSMENT
53 yo male h/o cad, htn, anxiety, asthma, a/w human meta pneumo virus, viral induced acute asthma exac, resp failure, atypical chest pain    pulm eval noted  nebs/steroids/supp o2  supportive care    elevated Ddimer  v/q low prob of PE  check LE dopplers    atypical chest pain   low suspicion for ACS  check CE x3  echo    nelly  ?baseline  ivf   monitor    anxiety  xanax prn    cont other home meds   dvt ppx

## 2018-01-04 ENCOUNTER — EMERGENCY (EMERGENCY)
Facility: HOSPITAL | Age: 53
LOS: 1 days | Discharge: HOME | End: 2018-01-04
Attending: EMERGENCY MEDICINE | Admitting: EMERGENCY MEDICINE
Payer: COMMERCIAL

## 2018-01-04 VITALS
DIASTOLIC BLOOD PRESSURE: 93 MMHG | RESPIRATION RATE: 24 BRPM | OXYGEN SATURATION: 94 % | HEART RATE: 88 BPM | TEMPERATURE: 99 F | SYSTOLIC BLOOD PRESSURE: 153 MMHG

## 2018-01-04 DIAGNOSIS — Z95.5 PRESENCE OF CORONARY ANGIOPLASTY IMPLANT AND GRAFT: Chronic | ICD-10-CM

## 2018-01-04 DIAGNOSIS — R09.2 RESPIRATORY ARREST: ICD-10-CM

## 2018-01-04 PROCEDURE — 99284 EMERGENCY DEPT VISIT MOD MDM: CPT | Mod: 25

## 2018-01-04 NOTE — ED ADULT NURSE NOTE - OBJECTIVE STATEMENT
53y/o male walked into ED a&ox3 c/o SOB and cough. Patient reports he was diagnosed with meta pneumovirus a few weeks ago and was given nebulizer treatments to use at home. Patient reports his oxygen tank is empty so he called his PCP who told him to come to ED to use oxygen. Patient presents to ED tachypneic with audible wheezing. Patient requesting to use hospital's oxygen and use his own albuterol treatments. Refusing EKG, chest x-ray, IV, labs or any other treatment at this time. MD Zamora at bedside, approved patient to use own albuterol treatment.

## 2018-01-05 VITALS
OXYGEN SATURATION: 95 % | SYSTOLIC BLOOD PRESSURE: 171 MMHG | HEART RATE: 90 BPM | DIASTOLIC BLOOD PRESSURE: 89 MMHG | RESPIRATION RATE: 22 BRPM

## 2018-01-05 PROBLEM — R09.2 RESPIRATORY ARREST: Chronic | Status: ACTIVE | Noted: 2018-01-03

## 2018-01-05 PROCEDURE — 82550 ASSAY OF CK (CPK): CPT

## 2018-01-05 PROCEDURE — 96374 THER/PROPH/DIAG INJ IV PUSH: CPT

## 2018-01-05 PROCEDURE — 85379 FIBRIN DEGRADATION QUANT: CPT

## 2018-01-05 PROCEDURE — 96372 THER/PROPH/DIAG INJ SC/IM: CPT

## 2018-01-05 PROCEDURE — 82803 BLOOD GASES ANY COMBINATION: CPT

## 2018-01-05 PROCEDURE — 87581 M.PNEUMON DNA AMP PROBE: CPT

## 2018-01-05 PROCEDURE — 82962 GLUCOSE BLOOD TEST: CPT

## 2018-01-05 PROCEDURE — 82947 ASSAY GLUCOSE BLOOD QUANT: CPT

## 2018-01-05 PROCEDURE — 83880 ASSAY OF NATRIURETIC PEPTIDE: CPT

## 2018-01-05 PROCEDURE — 99285 EMERGENCY DEPT VISIT HI MDM: CPT | Mod: 25

## 2018-01-05 PROCEDURE — 82330 ASSAY OF CALCIUM: CPT

## 2018-01-05 PROCEDURE — 82565 ASSAY OF CREATININE: CPT

## 2018-01-05 PROCEDURE — 94640 AIRWAY INHALATION TREATMENT: CPT

## 2018-01-05 PROCEDURE — 82435 ASSAY OF BLOOD CHLORIDE: CPT

## 2018-01-05 PROCEDURE — 71046 X-RAY EXAM CHEST 2 VIEWS: CPT

## 2018-01-05 PROCEDURE — 84484 ASSAY OF TROPONIN QUANT: CPT

## 2018-01-05 PROCEDURE — 80053 COMPREHEN METABOLIC PANEL: CPT

## 2018-01-05 PROCEDURE — 85014 HEMATOCRIT: CPT

## 2018-01-05 PROCEDURE — 85027 COMPLETE CBC AUTOMATED: CPT

## 2018-01-05 PROCEDURE — 83605 ASSAY OF LACTIC ACID: CPT

## 2018-01-05 PROCEDURE — A9540: CPT

## 2018-01-05 PROCEDURE — 87486 CHLMYD PNEUM DNA AMP PROBE: CPT

## 2018-01-05 PROCEDURE — 82553 CREATINE MB FRACTION: CPT

## 2018-01-05 PROCEDURE — 78582 LUNG VENTILAT&PERFUS IMAGING: CPT

## 2018-01-05 PROCEDURE — 84295 ASSAY OF SERUM SODIUM: CPT

## 2018-01-05 PROCEDURE — A9567: CPT

## 2018-01-05 PROCEDURE — 87798 DETECT AGENT NOS DNA AMP: CPT

## 2018-01-05 PROCEDURE — 84132 ASSAY OF SERUM POTASSIUM: CPT

## 2018-01-05 PROCEDURE — 99283 EMERGENCY DEPT VISIT LOW MDM: CPT | Mod: 25

## 2018-01-05 PROCEDURE — 87633 RESP VIRUS 12-25 TARGETS: CPT

## 2018-01-05 RX ORDER — ALBUTEROL 90 UG/1
1 AEROSOL, METERED ORAL ONCE
Qty: 0 | Refills: 0 | Status: COMPLETED | OUTPATIENT
Start: 2018-01-05 | End: 2018-01-05

## 2018-01-05 RX ORDER — DEXAMETHASONE 0.5 MG/5ML
10 ELIXIR ORAL ONCE
Qty: 0 | Refills: 0 | Status: COMPLETED | OUTPATIENT
Start: 2018-01-05 | End: 2018-01-05

## 2018-01-05 RX ADMIN — Medication 10 MILLIGRAM(S): at 00:32

## 2018-01-05 RX ADMIN — ALBUTEROL 1 PUFF(S): 90 AEROSOL, METERED ORAL at 00:52

## 2018-01-05 NOTE — ED PROVIDER NOTE - MEDICAL DECISION MAKING DETAILS
51 yo M presents with sob and wheezing. + metapneumovirus. PE with diffuse wheezing. 53 yo M presents with sob and wheezing.  Recent admission to the hospital. + metapneumovirus. PE with diffuse wheezing.  I did recommend patient that he obtained bloodwork, chest x-ray, EKG, however patient refused all workup. He is AAOx3,  Has full capacity to make all his medical decisions, and expresses understanding that without markup, he could have a serious medical condition resulting in significant disability and death.  patient brought his own duonebs  to the ER with him. He Self-administered his breathing treatments to himself.  He requested and I am shot of Decadron, and therefore this was given to him. He was also given an  albuterol inhaler to go home with. After breathing treatments, patient was reevaluated, and he reported significant improvement in a shortness of breath. Lungs are clear to auscultation, he was observed ambulating around the ER without any trouble breathing. Patient was discharged with instructions to take all his medications as prescribed, albuterol nebulizer as needed for his shortness of breath, and follow up with his primary care doctor in 1 to 2 days for reevaluation and further management    The patient was discharged from the ED in stable condition. All results of today's workup were discussed with the patient and all questions/concerns were addressed. All discharge instructions were thoroughly discussed with the patient, as well as important warning signs and new/ worsening symptoms which should necessitate patient's immediate return to the ED. The patient is agreeable with discharge and expresses full understanding of all instructions given.

## 2018-01-05 NOTE — ED PROVIDER NOTE - OBJECTIVE STATEMENT
53 yo M presents with sob and wheezing. he rates his sob as 6/10 at this time. 1 week ago he was diagnosed with metapneumovirus and states that he's been sob since. + rhinorrhea, nasal congestion. no chest pain/tightness/heaviness. no f/ch. patient has a nebulizer at home for which hes been taking albuterol, however the oxygen pipe broke and he hasn't been able to use his nebulizer. so he brought his duoneb medications with him and he wants to use our oxygen tubing to give himself a treatment. he refuses bloodwork, EKG, and CXR. He accepts to take prednisone. 51 yo M presents with sob and wheezing. he rates his sob as 6/10 at this time. 1 week ago he was diagnosed with metapneumovirus and states that he's been sob since. He was admitted to the hospital  until yesterday for the shortness of breath.  He had an entire inpatient workup for his shortness of breath, including a negative VQ scan. + rhinorrhea, nasal congestion. no chest pain/tightness/heaviness. no f/ch. patient has a nebulizer at home for which hes been taking albuterol, however the oxygen pipe broke and he hasn't been able to use his nebulizer. so he brought his duoneb medications with him and he wants to use our oxygen tubing to give himself a treatment. he refuses bloodwork, EKG, and CXR.

## 2018-02-15 ENCOUNTER — OUTPATIENT (OUTPATIENT)
Dept: OUTPATIENT SERVICES | Facility: HOSPITAL | Age: 53
LOS: 1 days | End: 2018-02-15
Payer: COMMERCIAL

## 2018-02-15 ENCOUNTER — APPOINTMENT (OUTPATIENT)
Dept: ULTRASOUND IMAGING | Facility: IMAGING CENTER | Age: 53
End: 2018-02-15
Payer: COMMERCIAL

## 2018-02-15 DIAGNOSIS — Z00.8 ENCOUNTER FOR OTHER GENERAL EXAMINATION: ICD-10-CM

## 2018-02-15 DIAGNOSIS — Z95.5 PRESENCE OF CORONARY ANGIOPLASTY IMPLANT AND GRAFT: Chronic | ICD-10-CM

## 2018-02-15 PROCEDURE — 93970 EXTREMITY STUDY: CPT

## 2018-02-15 PROCEDURE — 93970 EXTREMITY STUDY: CPT | Mod: 26

## 2018-08-24 ENCOUNTER — APPOINTMENT (OUTPATIENT)
Dept: PULMONOLOGY | Facility: CLINIC | Age: 53
End: 2018-08-24
Payer: COMMERCIAL

## 2018-08-24 ENCOUNTER — RECORD ABSTRACTING (OUTPATIENT)
Age: 53
End: 2018-08-24

## 2018-08-24 VITALS
RESPIRATION RATE: 16 BRPM | HEART RATE: 94 BPM | HEIGHT: 69 IN | WEIGHT: 193 LBS | OXYGEN SATURATION: 95 % | TEMPERATURE: 98.8 F | DIASTOLIC BLOOD PRESSURE: 101 MMHG | SYSTOLIC BLOOD PRESSURE: 164 MMHG | BODY MASS INDEX: 28.58 KG/M2

## 2018-08-24 DIAGNOSIS — I50.9 HEART FAILURE, UNSPECIFIED: ICD-10-CM

## 2018-08-24 PROCEDURE — 96401 CHEMO ANTI-NEOPL SQ/IM: CPT

## 2018-08-24 PROCEDURE — 99213 OFFICE O/P EST LOW 20 MIN: CPT | Mod: 25

## 2018-08-24 RX ORDER — MEPOLIZUMAB 100 MG/ML
100 INJECTION, POWDER, FOR SOLUTION SUBCUTANEOUS
Qty: 0 | Refills: 0 | Status: COMPLETED | OUTPATIENT
Start: 2018-08-24

## 2018-08-24 RX ADMIN — MEPOLIZUMAB 0.67 MG: 100 INJECTION, POWDER, FOR SOLUTION SUBCUTANEOUS at 00:00

## 2018-09-24 ENCOUNTER — APPOINTMENT (OUTPATIENT)
Dept: PULMONOLOGY | Facility: CLINIC | Age: 53
End: 2018-09-24
Payer: COMMERCIAL

## 2018-09-24 VITALS — OXYGEN SATURATION: 96 % | DIASTOLIC BLOOD PRESSURE: 102 MMHG | HEART RATE: 81 BPM | SYSTOLIC BLOOD PRESSURE: 146 MMHG

## 2018-09-24 PROCEDURE — 99213 OFFICE O/P EST LOW 20 MIN: CPT | Mod: 25

## 2018-09-24 PROCEDURE — 96401 CHEMO ANTI-NEOPL SQ/IM: CPT

## 2018-09-26 ENCOUNTER — MOBILE ON CALL (OUTPATIENT)
Age: 53
End: 2018-09-26

## 2018-10-02 ENCOUNTER — INPATIENT (INPATIENT)
Facility: HOSPITAL | Age: 53
LOS: 2 days | Discharge: HOME | DRG: 61 | End: 2018-10-05
Attending: PSYCHIATRY & NEUROLOGY | Admitting: PSYCHIATRY & NEUROLOGY
Payer: COMMERCIAL

## 2018-10-02 ENCOUNTER — TRANSCRIPTION ENCOUNTER (OUTPATIENT)
Age: 53
End: 2018-10-02

## 2018-10-02 VITALS
RESPIRATION RATE: 18 BRPM | HEIGHT: 69 IN | DIASTOLIC BLOOD PRESSURE: 107 MMHG | WEIGHT: 197.09 LBS | OXYGEN SATURATION: 97 % | TEMPERATURE: 98 F | HEART RATE: 104 BPM | SYSTOLIC BLOOD PRESSURE: 153 MMHG

## 2018-10-02 DIAGNOSIS — Z95.5 PRESENCE OF CORONARY ANGIOPLASTY IMPLANT AND GRAFT: Chronic | ICD-10-CM

## 2018-10-02 DIAGNOSIS — I63.9 CEREBRAL INFARCTION, UNSPECIFIED: ICD-10-CM

## 2018-10-02 LAB
ALBUMIN SERPL ELPH-MCNC: 4.9 G/DL — SIGNIFICANT CHANGE UP (ref 3.3–5)
ALP SERPL-CCNC: 69 U/L — SIGNIFICANT CHANGE UP (ref 40–120)
ALT FLD-CCNC: 18 U/L — SIGNIFICANT CHANGE UP (ref 10–45)
ANION GAP SERPL CALC-SCNC: 14 MMOL/L — SIGNIFICANT CHANGE UP (ref 5–17)
APTT BLD: 31.5 SEC — SIGNIFICANT CHANGE UP (ref 27.5–37.4)
AST SERPL-CCNC: 17 U/L — SIGNIFICANT CHANGE UP (ref 10–40)
BASOPHILS # BLD AUTO: 0 K/UL — SIGNIFICANT CHANGE UP (ref 0–0.2)
BASOPHILS NFR BLD AUTO: 0.4 % — SIGNIFICANT CHANGE UP (ref 0–2)
BILIRUB SERPL-MCNC: 0.3 MG/DL — SIGNIFICANT CHANGE UP (ref 0.2–1.2)
BUN SERPL-MCNC: 21 MG/DL — SIGNIFICANT CHANGE UP (ref 7–23)
CALCIUM SERPL-MCNC: 10.6 MG/DL — HIGH (ref 8.4–10.5)
CHLORIDE SERPL-SCNC: 99 MMOL/L — SIGNIFICANT CHANGE UP (ref 96–108)
CO2 SERPL-SCNC: 27 MMOL/L — SIGNIFICANT CHANGE UP (ref 22–31)
CREAT SERPL-MCNC: 1.29 MG/DL — SIGNIFICANT CHANGE UP (ref 0.5–1.3)
EOSINOPHIL # BLD AUTO: 0.1 K/UL — SIGNIFICANT CHANGE UP (ref 0–0.5)
EOSINOPHIL NFR BLD AUTO: 0.5 % — SIGNIFICANT CHANGE UP (ref 0–6)
GLUCOSE SERPL-MCNC: 131 MG/DL — HIGH (ref 70–99)
HCT VFR BLD CALC: 48.5 % — SIGNIFICANT CHANGE UP (ref 39–50)
HGB BLD-MCNC: 16.2 G/DL — SIGNIFICANT CHANGE UP (ref 13–17)
INR BLD: 1.09 RATIO — SIGNIFICANT CHANGE UP (ref 0.88–1.16)
LYMPHOCYTES # BLD AUTO: 2.5 K/UL — SIGNIFICANT CHANGE UP (ref 1–3.3)
LYMPHOCYTES # BLD AUTO: 25.4 % — SIGNIFICANT CHANGE UP (ref 13–44)
MCHC RBC-ENTMCNC: 27.3 PG — SIGNIFICANT CHANGE UP (ref 27–34)
MCHC RBC-ENTMCNC: 33.3 GM/DL — SIGNIFICANT CHANGE UP (ref 32–36)
MCV RBC AUTO: 81.9 FL — SIGNIFICANT CHANGE UP (ref 80–100)
MONOCYTES # BLD AUTO: 0.6 K/UL — SIGNIFICANT CHANGE UP (ref 0–0.9)
MONOCYTES NFR BLD AUTO: 6.6 % — SIGNIFICANT CHANGE UP (ref 2–14)
NEUTROPHILS # BLD AUTO: 6.6 K/UL — SIGNIFICANT CHANGE UP (ref 1.8–7.4)
NEUTROPHILS NFR BLD AUTO: 67.1 % — SIGNIFICANT CHANGE UP (ref 43–77)
PLATELET # BLD AUTO: 279 K/UL — SIGNIFICANT CHANGE UP (ref 150–400)
POTASSIUM SERPL-MCNC: 3.1 MMOL/L — LOW (ref 3.5–5.3)
POTASSIUM SERPL-SCNC: 3.1 MMOL/L — LOW (ref 3.5–5.3)
PROT SERPL-MCNC: 8.3 G/DL — SIGNIFICANT CHANGE UP (ref 6–8.3)
PROTHROM AB SERPL-ACNC: 11.9 SEC — SIGNIFICANT CHANGE UP (ref 9.8–12.7)
RBC # BLD: 5.93 M/UL — HIGH (ref 4.2–5.8)
RBC # FLD: 13.6 % — SIGNIFICANT CHANGE UP (ref 10.3–14.5)
SODIUM SERPL-SCNC: 140 MMOL/L — SIGNIFICANT CHANGE UP (ref 135–145)
TROPONIN T, HIGH SENSITIVITY RESULT: 19 NG/L — SIGNIFICANT CHANGE UP (ref 0–51)
WBC # BLD: 9.9 K/UL — SIGNIFICANT CHANGE UP (ref 3.8–10.5)
WBC # FLD AUTO: 9.9 K/UL — SIGNIFICANT CHANGE UP (ref 3.8–10.5)

## 2018-10-02 PROCEDURE — 99291 CRITICAL CARE FIRST HOUR: CPT

## 2018-10-02 PROCEDURE — 70450 CT HEAD/BRAIN W/O DYE: CPT | Mod: 26

## 2018-10-02 PROCEDURE — 93010 ELECTROCARDIOGRAM REPORT: CPT

## 2018-10-02 RX ORDER — MONTELUKAST 4 MG/1
10 TABLET, CHEWABLE ORAL DAILY
Qty: 0 | Refills: 0 | Status: DISCONTINUED | OUTPATIENT
Start: 2018-10-02 | End: 2018-10-05

## 2018-10-02 RX ORDER — ALTEPLASE 100 MG
72 KIT INTRAVENOUS ONCE
Qty: 0 | Refills: 0 | Status: DISCONTINUED | OUTPATIENT
Start: 2018-10-02 | End: 2018-10-02

## 2018-10-02 RX ORDER — CLOPIDOGREL BISULFATE 75 MG/1
1 TABLET, FILM COATED ORAL
Qty: 0 | Refills: 0 | COMMUNITY

## 2018-10-02 RX ORDER — ATORVASTATIN CALCIUM 80 MG/1
80 TABLET, FILM COATED ORAL AT BEDTIME
Qty: 0 | Refills: 0 | Status: DISCONTINUED | OUTPATIENT
Start: 2018-10-02 | End: 2018-10-05

## 2018-10-02 RX ORDER — ALTEPLASE 100 MG
81 KIT INTRAVENOUS ONCE
Qty: 0 | Refills: 0 | Status: COMPLETED | OUTPATIENT
Start: 2018-10-02 | End: 2018-10-02

## 2018-10-02 RX ORDER — IPRATROPIUM BROMIDE 0.2 MG/ML
2.5 SOLUTION, NON-ORAL INHALATION
Qty: 0 | Refills: 0 | COMMUNITY

## 2018-10-02 RX ORDER — BUDESONIDE AND FORMOTEROL FUMARATE DIHYDRATE 160; 4.5 UG/1; UG/1
2 AEROSOL RESPIRATORY (INHALATION)
Qty: 0 | Refills: 0 | Status: DISCONTINUED | OUTPATIENT
Start: 2018-10-02 | End: 2018-10-05

## 2018-10-02 RX ORDER — GLYCOPYRROLATE AND FORMOTEROL FUMARATE 9; 4.8 UG/1; UG/1
1 AEROSOL, METERED RESPIRATORY (INHALATION)
Qty: 0 | Refills: 0 | COMMUNITY

## 2018-10-02 RX ORDER — POTASSIUM CHLORIDE 20 MEQ
10 PACKET (EA) ORAL
Qty: 0 | Refills: 0 | Status: COMPLETED | OUTPATIENT
Start: 2018-10-02 | End: 2018-10-03

## 2018-10-02 RX ORDER — ALTEPLASE 100 MG
8 KIT INTRAVENOUS ONCE
Qty: 0 | Refills: 0 | Status: DISCONTINUED | OUTPATIENT
Start: 2018-10-02 | End: 2018-10-02

## 2018-10-02 RX ORDER — HYDRALAZINE HCL 50 MG
10 TABLET ORAL ONCE
Qty: 0 | Refills: 0 | Status: COMPLETED | OUTPATIENT
Start: 2018-10-02 | End: 2018-10-02

## 2018-10-02 RX ORDER — SODIUM CHLORIDE 9 MG/ML
1000 INJECTION INTRAMUSCULAR; INTRAVENOUS; SUBCUTANEOUS
Qty: 0 | Refills: 0 | Status: DISCONTINUED | OUTPATIENT
Start: 2018-10-02 | End: 2018-10-05

## 2018-10-02 RX ORDER — LISINOPRIL 2.5 MG/1
1 TABLET ORAL
Qty: 0 | Refills: 0 | COMMUNITY

## 2018-10-02 RX ORDER — ALTEPLASE 100 MG
9 KIT INTRAVENOUS ONCE
Qty: 0 | Refills: 0 | Status: COMPLETED | OUTPATIENT
Start: 2018-10-02 | End: 2018-10-02

## 2018-10-02 RX ORDER — CHOLECALCIFEROL (VITAMIN D3) 125 MCG
1 CAPSULE ORAL
Qty: 0 | Refills: 0 | COMMUNITY

## 2018-10-02 RX ADMIN — Medication 10 MILLIGRAM(S): at 18:30

## 2018-10-02 RX ADMIN — ALTEPLASE 540 MILLIGRAM(S): KIT at 18:33

## 2018-10-02 RX ADMIN — BUDESONIDE AND FORMOTEROL FUMARATE DIHYDRATE 2 PUFF(S): 160; 4.5 AEROSOL RESPIRATORY (INHALATION) at 22:51

## 2018-10-02 RX ADMIN — Medication 100 MILLIEQUIVALENT(S): at 22:51

## 2018-10-02 RX ADMIN — ALTEPLASE 81 MILLIGRAM(S): KIT at 18:35

## 2018-10-02 RX ADMIN — SODIUM CHLORIDE 60 MILLILITER(S): 9 INJECTION INTRAMUSCULAR; INTRAVENOUS; SUBCUTANEOUS at 21:43

## 2018-10-02 NOTE — ED ADULT NURSE REASSESSMENT NOTE - NS ED NURSE REASSESS COMMENT FT1
Received report from Kay MAHONEY, pt received TPA in ED, resting in stretcher comfortably, VSS, awaiting SICU bed.

## 2018-10-02 NOTE — H&P ADULT - PMH
CAD (coronary artery disease)  2009; stent  HTN (hypertension)    Intracranial hemorrhage  2008  Respiratory arrest  december 1st CAD (coronary artery disease)  2009; stent  HTN (hypertension)    Intracranial hemorrhage  2008  Myocardial infarction, unspecified MI type, unspecified artery    Respiratory arrest  december 1st

## 2018-10-02 NOTE — DISCHARGE NOTE ADULT - CARE PLAN
Principal Discharge DX:	Ischemic stroke  Goal:	secondary stroke prevention  Assessment and plan of treatment:	Patient should be on ASA 81mg daily

## 2018-10-02 NOTE — DISCHARGE NOTE ADULT - MEDICATION SUMMARY - MEDICATIONS TO TAKE
I will START or STAY ON the medications listed below when I get home from the hospital:    aspirin 81 mg oral tablet, chewable  -- 1 tab(s) by mouth once a day  -- Indication: For Ischemic stroke    losartan 25 mg oral tablet  -- 1 tab(s) by mouth once a day  -- Indication: For HTN (hypertension)    Farxiga 10 mg oral tablet  -- 1 tab(s) by mouth once a day  -- Indication: For DM    Crestor 20 mg oral tablet  -- 1 tab(s) by mouth once a day (at bedtime)  -- Indication: For HLD    Symbicort 160 mcg-4.5 mcg/inh inhalation aerosol  -- 2 puff(s) inhaled 2 times a day  -- Indication: For asthma    hydroCHLOROthiazide 25 mg oral tablet  -- 1 tab(s) by mouth once a day (in the morning)  -- Indication: For HTN (hypertension)    montelukast 10 mg oral tablet  -- 1 tab(s) by mouth once a day  -- Indication: For asthma

## 2018-10-02 NOTE — ED PROVIDER NOTE - PROGRESS NOTE DETAILS
Dr. Cobb Note: pt reassessed every 5 minutes for past 40 minutes.  No change in neuro status, BP was still elevated despite relaxation techniques and adjustment of BP cuff at 170s/110s, hydralazine 10mg IV given with reduction in BP to acceptable range and tpa administered.  Repeat exam about 10min after tpa administration: no change in neuro, BPs 140s/70s, no changes otherwise.  Pt stable for admission to stroke unit.  Risks and benefits of tpa and expected hospital course discussed with pt by myself and separately by neurologist.

## 2018-10-02 NOTE — H&P ADULT - NSHPLABSRESULTS_GEN_ALL_CORE
16.2   9.9   )-----------( 279      ( 02 Oct 2018 18:20 )             48.5     10-02    140  |  99  |  21  ----------------------------<  131<H>  3.1<L>   |  27  |  1.29    Ca    10.6<H>      02 Oct 2018 18:20    TPro  8.3  /  Alb  4.9  /  TBili  0.3  /  DBili  x   /  AST  17  /  ALT  18  /  AlkPhos  69  10-02    CAPILLARY BLOOD GLUCOSE  150 (02 Oct 2018 18:55)      POCT Blood Glucose.: 150 mg/dL (02 Oct 2018 18:01)    PT/INR - ( 02 Oct 2018 18:20 )   PT: 11.9 sec;   INR: 1.09 ratio      PTT - ( 02 Oct 2018 18:20 )  PTT:31.5 sec      Vital Signs Last 24 Hrs  T(C): 36.7 (02 Oct 2018 18:50), Max: 36.8 (02 Oct 2018 18:29)  T(F): 98.1 (02 Oct 2018 18:50), Max: 98.2 (02 Oct 2018 18:29)  HR: 89 (02 Oct 2018 18:50) (88 - 104)  BP: 141/83 (02 Oct 2018 18:50) (141/83 - 167/114)  RR: 15 (02 Oct 2018 18:50) (15 - 18)  SpO2: 100% (02 Oct 2018 18:50) (97% - 100%)

## 2018-10-02 NOTE — H&P ADULT - NSHPPHYSICALEXAM_GEN_ALL_CORE
Neurological Exam:  Mental Status: Orientated to self, date and place.  Attention intact.  No dysarthria, aphasia or neglect.  Knowledge intact.  Registration intact.  Short and long term memory grossly intact.      Cranial Nerves: PERRL, EOMI, right homonymous hemianopsia, no nystagmus or diplopia.  CN V1-3 intact to light touch. No facial asymmetry.  Hearing intact.  Tongue midline.  Sternocleidomastoid and Trapezius intact bilaterally.    Motor:   Tone: normal            Strength:     Upper extremity                      Delt       Bicep    Tricep                                               R         5/5 5/5 5/5 5/5                                            L          5/5 5/5 5/5 5/5  Lower extremity                       HF          KE          KF        DF         PF                                            R        5/5 5/5 5/5 5/5 5/5                                            L         5/5 5/5 5/5 5/5 5/5  Pronator drift: none                 Dysmetria: None to finger-nose-finger or heel-shin-heel  No truncal ataxia.    Tremor: No resting, postural or action tremor.  No myoclonus.    Sensation: intact to light touch. No extinction    Deep Tendon Reflexes: 2+ bilateral biceps, triceps, brachioradialis, knee  Toes flexor bilaterally    Gait: normal and stable.

## 2018-10-02 NOTE — ED ADULT NURSE NOTE - INTERVENTIONS DEFINITIONS
[place] to call system/Instruct patient to call for assistance/Monitor gait and stability/Non-slip footwear when patient is off stretcher

## 2018-10-02 NOTE — H&P ADULT - HISTORY OF PRESENT ILLNESS
Patient is a 53 year old right handed [place]ican [place] male presenting with headache and vision loss. 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 Last known normal was 3:20pm. Around 3:30pm, he was taking something out of the car when he noted a left sided headache associated with vision loss on the right side of his vision. Denies any numbness, tingling, nausea, vomiting, weakness. Patient is a 53 year old right handed Cypriot [place] male presenting with headache and vision loss. Patient has PMH of HTN, preDM, R thalamocapsular hemorrhage 2008 with no residual symptoms, and MI in September, 2009. Last known normal was 3:20pm. Around 3:30pm, he was taking something out of the car when he noted a left sided headache associated with vision loss on the right side of his vision. Denies any numbness, tingling, nausea, vomiting, weakness.

## 2018-10-02 NOTE — H&P ADULT - ATTENDING COMMENTS
I have seen and examined this patient with the stroke neurology team.     History was reviewed with the patient.   ROS: All negative except documented in the HPI.   Neurological exam was performed and agree with exam as documented above.   Laboratory results and imaging studies were reviewed by me.   I agree with the neurology resident note as documented above.    53 years old man with multiple vascular risk factors including age, HTN, pre-DM II and right thalamocapsular ICH in 2008 is evaluated at [facility] for acute onset of right-sided binocular visual loss with last known well being at 3:20 PM on 10/2. CT brain admission did not show any evidence of acute infarct or hemorrhage. After discussing the risks and benefits (especially in the setting of prior ICH), he was treated with IV tPA as benefits were thought to outweigh potential risks. Neurological examination today shows right superior quadrantanopsia.     Impression:  Cerebral embolism with cerebral infarction. Left PCA distribution stroke - likely etiology being cryptogenic stroke, probably related to embolism from a proximal source like cardiac/paradoxical source of embolism but possibility of large vessel disease needs to be ruled out    Plan:   - Continue with  24 hours post IV tPA precautions including BP goal<180/105 mmHg for first 24 hours followed by gradual normotension as per protocol  - Not a candidate for neurosurgical intervention due to low NIHSS   - Awaiting MRI brain without contrast, MRA head and neck to evaluate for intracranial and extracranial cerebral vasculature   - Aspirin and pharmacological DVT prophylaxis to be considered at 24 hours after the IV tPA based on repeating brain imaging, SCDs for DVT prophylaxis in the interim  - Atorvastatin 80 mg at bedtime after establishing enteral access or passing swallow evaluation; further dose titration as per LDL results  - HbA1C and LDL, continue with aggressive vascular risk factors modifications   - CHANDLER with bubble study and continue with telemetry to screen for possible cardiac source of embolism  - Prolonged cardiac monitoring with ICM to screen for occult cardiac arrhythmias like atrial fibrillation being the cardiac source of embolism to be considered based on results of above mentioned cardiac tests  - PT/OT/Speech and swallow evaluation   - Stroke education     Above mentioned plan was discussed with patient and available family member in detail. All the questions were answered and concerns were addressed. More than 86 minutes were spent in evaluation and management of this critically ill and unstable patient with an acute stroke.

## 2018-10-02 NOTE — ED PROVIDER NOTE - OBJECTIVE STATEMENT
Dr. Cobb Note: Code stroke called from triage.  Pt with acute partial visual loss at 15:20 with LEFT visual field loss and RIGHT side headache.  No diplopia, dysphagia, dysarthria, ataxia, focal weakness in arm or leg. Pt ambulatory in ED.  Rest of exam deferred for emergent ct scan.

## 2018-10-02 NOTE — ED PROVIDER NOTE - PMH
CAD (coronary artery disease)  2009; stent  HTN (hypertension)    Intracranial hemorrhage  2008  Myocardial infarction, unspecified MI type, unspecified artery    Respiratory arrest  december 1st

## 2018-10-02 NOTE — ED ADULT TRIAGE NOTE - CHIEF COMPLAINT QUOTE
Patient spoke with PMD - advised to come to ED - Patient has no peripheral vision on right side and headache on the left side which started at 330pm. No weakness

## 2018-10-02 NOTE — ED ADULT NURSE NOTE - OBJECTIVE STATEMENT
52 yo male with PMH SDH in 2009, HTN, on Plavix presents to the ED from home c/o left sided HA and right eye visual field loss 1530 today. patient states he was leaning in reaching into the car and the symptoms started afterwards. patient is AAOx3. PERRL. 3mm pupils bilaterally. upper and lower extremities equal in strength and sensation. gait steady. no facial droop noted. patient denies chest pain, SOB, fevers, chills, N/V/D, dizziness, cough, abdominal pain, numbness or tingling, back pain, dysuria. NSR on monitor. MD at the bedside. VSS. room air

## 2018-10-02 NOTE — ED ADULT NURSE REASSESSMENT NOTE - NS ED NURSE REASSESS COMMENT FT1
1905- patient resting comfortably in bed in no acute distress. patient states no s/s have changed since he has been in ED. patient in no acute distress. patient states left sided HA 5/10. VSS. report given to MARU Brown. TPA infusing as ordered.

## 2018-10-02 NOTE — ED PROVIDER NOTE - ATTENDING CONTRIBUTION TO CARE
Code stroke called from triage.  Pt with acute partial visual loss at 15:20 with LEFT visual field loss and RIGHT side headache.  No diplopia, dysphagia, dysarthria, ataxia, focal weakness in arm or leg. Pt ambulatory in ED.  Rest of exam deferred for emergent ct scan.

## 2018-10-02 NOTE — H&P ADULT - ASSESSMENT
53 year old right handed [place] male presenting with headache and vision loss. 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 Last known normal was 3:20pm. Around 3:30pm, he was taking something out of the car when he noted a left sided headache associated with vision loss on the right side of his vision. Denies any numbness, tingling, nausea, vomiting, weakness.  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

## 2018-10-02 NOTE — CONSULT NOTE ADULT - SUBJECTIVE AND OBJECTIVE BOX
CHIEF COMPLAINT:    HISTORY OF PRESENT ILLNESS:    PAST MEDICAL & SURGICAL HISTORY:  Respiratory arrest: december 1st  Intracranial hemorrhage: 2008  CAD (coronary artery disease): 2009; stent  HTN (hypertension)  History of coronary artery stent placement          MEDICATIONS:      buDESOnide 160 MICROgram(s)/formoterol 4.5 MICROgram(s) Inhaler 2 Puff(s) Inhalation two times a day  montelukast 10 milliGRAM(s) Oral daily        atorvastatin 80 milliGRAM(s) Oral at bedtime    sodium chloride 0.9%. 1000 milliLiter(s) IV Continuous <Continuous>      FAMILY HISTORY:  Family history of hypertension in mother: Mother  Family history of meningitis (Sibling): Brother, death at age 49 from complications of infection (June 2015)      SOCIAL HISTORY:    [ ] Non-smoker  [ ] Smoker  [ ] Alcohol    Allergies    contrast dye (Short breath)  No Known Drug Allergies    Intolerances    	    REVIEW OF SYSTEMS:  CONSTITUTIONAL: No fever, weight loss, or fatigue  EYES: No eye pain, visual disturbances, or discharge  ENMT:  No difficulty hearing, tinnitus, vertigo; No sinus or throat pain  NECK: No pain or stiffness  RESPIRATORY: No cough, wheezing, chills or hemoptysis; No Shortness of Breath  CARDIOVASCULAR: No chest pain, palpitations, passing out, dizziness, or leg swelling  GASTROINTESTINAL: No abdominal or epigastric pain. No nausea, vomiting, or hematemesis; No diarrhea or constipation. No melena or hematochezia.  GENITOURINARY: No dysuria, frequency, hematuria, or incontinence  NEUROLOGICAL: No headaches, memory loss, loss of strength, numbness, or tremors  SKIN: No itching, burning, rashes, or lesions   LYMPH Nodes: No enlarged glands  ENDOCRINE: No heat or cold intolerance; No hair loss  MUSCULOSKELETAL: No joint pain or swelling; No muscle, back, or extremity pain  PSYCHIATRIC: No depression, anxiety, mood swings, or difficulty sleeping  HEME/LYMPH: No easy bruising, or bleeding gums  ALLERY AND IMMUNOLOGIC: No hives or eczema	    [ ] All others negative	  [ ] Unable to obtain    PHYSICAL EXAM:  T(C): 37.4 (10-02-18 @ 19:45), Max: 37.4 (10-02-18 @ 19:45)  HR: 83 (10-02-18 @ 19:45) (83 - 104)  BP: 147/80 (10-02-18 @ 19:45) (122/74 - 167/114)  RR: 18 (10-02-18 @ 19:45) (12 - 18)  SpO2: 97% (10-02-18 @ 19:45) (97% - 100%)  Wt(kg): --  I&O's Summary      Appearance: Normal	  HEENT:   Normal oral mucosa, PERRL, EOMI	  Lymphatic: No lymphadenopathy  Cardiovascular: Normal S1 S2, No JVD, No murmurs, No edema  Respiratory: Lungs clear to auscultation	  Psychiatry: A & O x 3, Mood & affect appropriate  Gastrointestinal:  Soft, Non-tender, + BS	  Skin: No rashes, No ecchymoses, No cyanosis	  Neurologic: Non-focal  Extremities: Normal range of motion, No clubbing, cyanosis or edema  Vascular: Peripheral pulses palpable 2+ bilaterally    TELEMETRY: 	    ECG:  	  RADIOLOGY: < from: CT Brain Stroke Protocol (10.02.18 @ 18:08) >    EXAM:  CT BRAIN STROKE PROTOCOL                            PROCEDURE DATE:  10/02/2018            INTERPRETATION:  CLINICAL INFORMATION: Left-sided headache and loss of   vision in the right eye.    COMPARISON: Head CT 10/7/2015.    TECHNIQUE: Multiple axial CT images of the head from the base of the   skull to the vertex were obtained without the administration of   intravenous contrast. Coronal and sagittal reformats were submitted.       FINDINGS:    No intracranial hemorrhage, mass effect, or midline shift. Chronic white   matter microvascular ischemic changes. Age related cerebral volume loss.   No hydrocephalus. The basal cisterns are not effaced.     Visualized paranasal sinuses and mastoid air cells are clear. No   depressed calvarial fracture. Visualized soft tissues are unremarkable.    IMPRESSION:   No intracranial hemorrhage, mass effect, or midline shift.   If clinical symptoms are new and persistent, consider further evaluation   with brain MRI examination, if there are noMRI contraindications.    These findings were discussed with Dr. Deleon  at 10/2/2018 6:07 PM by Dr. Novoa.                  SUKHI NOVOA M.D., RADIOLOGY RESIDENT  This document has been electronically signed.  TO SARAVIA M.D., ATTENDING RADIOLOGIST  This document has been electronically signed. Oct  2 2018  6:12PM                < end of copied text >    OTHER: 	  	  LABS:	 	    CARDIAC MARKERS:                                  16.2   9.9   )-----------( 279      ( 02 Oct 2018 18:20 )             48.5     10-02    140  |  99  |  21  ----------------------------<  131<H>  3.1<L>   |  27  |  1.29    Ca    10.6<H>      02 Oct 2018 18:20    TPro  8.3  /  Alb  4.9  /  TBili  0.3  /  DBili  x   /  AST  17  /  ALT  18  /  AlkPhos  69  10-02    proBNP:   Lipid Profile:   HgA1c:   TSH: CHIEF COMPLAINT:    HISTORY OF PRESENT ILLNESS: 53 year old male well known to me presenting with headache and vision loss. Patient has PMH of HTN, preDM, R thalamocapsular hemorrhage 2008 with no residual symptoms, and MI in September, 2009. Last known normal was 3:20pm. Around 3:30pm, he was taking something out of the car when he noted a left sided headache associated with vision loss on the right side of his vision. Denies any numbness, tingling, nausea, vomiting, weakness.  Code stroke called and patient was started on tPa.   Now in stroke unit and feeling better. Symptoms resolved       PAST MEDICAL & SURGICAL HISTORY:  Respiratory arrest: december 1st  Intracranial hemorrhage: 2008  CAD (coronary artery disease): 2009; stent  HTN (hypertension)  History of coronary artery stent placement          MEDICATIONS:      buDESOnide 160 MICROgram(s)/formoterol 4.5 MICROgram(s) Inhaler 2 Puff(s) Inhalation two times a day  montelukast 10 milliGRAM(s) Oral daily        atorvastatin 80 milliGRAM(s) Oral at bedtime    sodium chloride 0.9%. 1000 milliLiter(s) IV Continuous <Continuous>      FAMILY HISTORY:  Family history of hypertension in mother: Mother  Family history of meningitis (Sibling): Brother, death at age 49 from complications of infection (June 2015)      SOCIAL HISTORY:    [ ] Non-smoker  [ ] Smoker  [ ] Alcohol    Allergies    contrast dye (Short breath)  No Known Drug Allergies    Intolerances    	    REVIEW OF SYSTEMS:  CONSTITUTIONAL: No fever, weight loss, + fatigue  EYES: No eye pain, +visual disturbances, or discharge  ENMT:  No difficulty hearing, tinnitus, vertigo; No sinus or throat pain  NECK: No pain or stiffness  RESPIRATORY: No cough, wheezing, chills or hemoptysis; No Shortness of Breath  CARDIOVASCULAR: No chest pain, palpitations, passing out, dizziness, or leg swelling  GASTROINTESTINAL: No abdominal or epigastric pain. No nausea, vomiting, or hematemesis; No diarrhea or constipation. No melena or hematochezia.  GENITOURINARY: No dysuria, frequency, hematuria, or incontinence  NEUROLOGICAL: No headaches, memory loss, loss of strength, numbness, or tremors  SKIN: No itching, burning, rashes, or lesions   LYMPH Nodes: No enlarged glands  ENDOCRINE: No heat or cold intolerance; No hair loss  MUSCULOSKELETAL: + joint pain or swelling; No muscle, back, or extremity pain  PSYCHIATRIC: No depression, anxiety, mood swings, or difficulty sleeping  HEME/LYMPH: No easy bruising, or bleeding gums  ALLERY AND IMMUNOLOGIC: No hives or eczema	    [ ] All others negative	  [ ] Unable to obtain    PHYSICAL EXAM:  T(C): 37.4 (10-02-18 @ 19:45), Max: 37.4 (10-02-18 @ 19:45)  HR: 83 (10-02-18 @ 19:45) (83 - 104)  BP: 147/80 (10-02-18 @ 19:45) (122/74 - 167/114)  RR: 18 (10-02-18 @ 19:45) (12 - 18)  SpO2: 97% (10-02-18 @ 19:45) (97% - 100%)  Wt(kg): --  I&O's Summary      Appearance: Normal	  HEENT:   Normal oral mucosa, PERRL, EOMI	  Lymphatic: No lymphadenopathy  Cardiovascular: Normal S1 S2, No JVD, No murmurs, No edema  Respiratory: Lungs clear to auscultation	  Psychiatry: A & O x 3, Mood & affect appropriate  Gastrointestinal:  Soft, Non-tender, + BS	  Skin: No rashes, No ecchymoses, No cyanosis	  Neurologic: Non-focal  Extremities: Normal range of motion, No clubbing, cyanosis or edema  Vascular: Peripheral pulses palpable 2+ bilaterally    TELEMETRY: 	  SR  ECG:  	  RADIOLOGY: < from: CT Brain Stroke Protocol (10.02.18 @ 18:08) >    EXAM:  CT BRAIN STROKE PROTOCOL                            PROCEDURE DATE:  10/02/2018            INTERPRETATION:  CLINICAL INFORMATION: Left-sided headache and loss of   vision in the right eye.    COMPARISON: Head CT 10/7/2015.    TECHNIQUE: Multiple axial CT images of the head from the base of the   skull to the vertex were obtained without the administration of   intravenous contrast. Coronal and sagittal reformats were submitted.       FINDINGS:    No intracranial hemorrhage, mass effect, or midline shift. Chronic white   matter microvascular ischemic changes. Age related cerebral volume loss.   No hydrocephalus. The basal cisterns are not effaced.     Visualized paranasal sinuses and mastoid air cells are clear. No   depressed calvarial fracture. Visualized soft tissues are unremarkable.    IMPRESSION:   No intracranial hemorrhage, mass effect, or midline shift.   If clinical symptoms are new and persistent, consider further evaluation   with brain MRI examination, if there are noMRI contraindications.    These findings were discussed with Dr. Deleon  at 10/2/2018 6:07 PM by Dr. Novoa.                  SUKHI NOVOA M.D., RADIOLOGY RESIDENT  This document has been electronically signed.  TO SARAVIA M.D., ATTENDING RADIOLOGIST  This document has been electronically signed. Oct  2 2018  6:12PM                < end of copied text >    OTHER: 	  	  LABS:	 	    CARDIAC MARKERS:                                  16.2   9.9   )-----------( 279      ( 02 Oct 2018 18:20 )             48.5     10-02    140  |  99  |  21  ----------------------------<  131<H>  3.1<L>   |  27  |  1.29    Ca    10.6<H>      02 Oct 2018 18:20    TPro  8.3  /  Alb  4.9  /  TBili  0.3  /  DBili  x   /  AST  17  /  ALT  18  /  AlkPhos  69  10-02    proBNP:   Lipid Profile:   HgA1c:   TSH:

## 2018-10-02 NOTE — DISCHARGE NOTE ADULT - CARE PROVIDER_API CALL
Berry Ramos), Neurology; Vascular Neurology  [address] [place] [address]  Phone: [phone]  Fax: [phone]

## 2018-10-02 NOTE — DISCHARGE NOTE ADULT - PATIENT PORTAL LINK FT
You can access the [facility] Patient Portal, offered by [facility], by registering with the following website: [email or web address]

## 2018-10-03 DIAGNOSIS — I63.9 CEREBRAL INFARCTION, UNSPECIFIED: ICD-10-CM

## 2018-10-03 DIAGNOSIS — I10 ESSENTIAL (PRIMARY) HYPERTENSION: ICD-10-CM

## 2018-10-03 DIAGNOSIS — E11.9 TYPE 2 DIABETES MELLITUS WITHOUT COMPLICATIONS: ICD-10-CM

## 2018-10-03 DIAGNOSIS — I25.10 ATHEROSCLEROTIC HEART DISEASE OF NATIVE CORONARY ARTERY WITHOUT ANGINA PECTORIS: ICD-10-CM

## 2018-10-03 LAB
ALBUMIN SERPL ELPH-MCNC: 4.1 G/DL — SIGNIFICANT CHANGE UP (ref 3.3–5)
ALP SERPL-CCNC: 57 U/L — SIGNIFICANT CHANGE UP (ref 40–120)
ALT FLD-CCNC: 14 U/L — SIGNIFICANT CHANGE UP (ref 10–45)
ANION GAP SERPL CALC-SCNC: 13 MMOL/L — SIGNIFICANT CHANGE UP (ref 5–17)
AST SERPL-CCNC: 15 U/L — SIGNIFICANT CHANGE UP (ref 10–40)
BASOPHILS # BLD AUTO: 0 K/UL — SIGNIFICANT CHANGE UP (ref 0–0.2)
BASOPHILS NFR BLD AUTO: 0.1 % — SIGNIFICANT CHANGE UP (ref 0–2)
BILIRUB SERPL-MCNC: 0.4 MG/DL — SIGNIFICANT CHANGE UP (ref 0.2–1.2)
BUN SERPL-MCNC: 20 MG/DL — SIGNIFICANT CHANGE UP (ref 7–23)
CALCIUM SERPL-MCNC: 9.5 MG/DL — SIGNIFICANT CHANGE UP (ref 8.4–10.5)
CHLORIDE SERPL-SCNC: 102 MMOL/L — SIGNIFICANT CHANGE UP (ref 96–108)
CHOLEST SERPL-MCNC: 154 MG/DL — SIGNIFICANT CHANGE UP (ref 10–199)
CO2 SERPL-SCNC: 23 MMOL/L — SIGNIFICANT CHANGE UP (ref 22–31)
CREAT SERPL-MCNC: 1.15 MG/DL — SIGNIFICANT CHANGE UP (ref 0.5–1.3)
EOSINOPHIL # BLD AUTO: 0.1 K/UL — SIGNIFICANT CHANGE UP (ref 0–0.5)
EOSINOPHIL NFR BLD AUTO: 0.9 % — SIGNIFICANT CHANGE UP (ref 0–6)
GLUCOSE BLDC GLUCOMTR-MCNC: 125 MG/DL — HIGH (ref 70–99)
GLUCOSE BLDC GLUCOMTR-MCNC: 142 MG/DL — HIGH (ref 70–99)
GLUCOSE BLDC GLUCOMTR-MCNC: 176 MG/DL — HIGH (ref 70–99)
GLUCOSE SERPL-MCNC: 122 MG/DL — HIGH (ref 70–99)
HBA1C BLD-MCNC: 8.1 % — HIGH (ref 4–5.6)
HCT VFR BLD CALC: 43.6 % — SIGNIFICANT CHANGE UP (ref 39–50)
HDLC SERPL-MCNC: 34 MG/DL — LOW
HGB BLD-MCNC: 14.9 G/DL — SIGNIFICANT CHANGE UP (ref 13–17)
LIPID PNL WITH DIRECT LDL SERPL: 96 MG/DL — SIGNIFICANT CHANGE UP
LYMPHOCYTES # BLD AUTO: 2.7 K/UL — SIGNIFICANT CHANGE UP (ref 1–3.3)
LYMPHOCYTES # BLD AUTO: 31.3 % — SIGNIFICANT CHANGE UP (ref 13–44)
MCHC RBC-ENTMCNC: 28.2 PG — SIGNIFICANT CHANGE UP (ref 27–34)
MCHC RBC-ENTMCNC: 34.2 GM/DL — SIGNIFICANT CHANGE UP (ref 32–36)
MCV RBC AUTO: 82.5 FL — SIGNIFICANT CHANGE UP (ref 80–100)
MONOCYTES # BLD AUTO: 0.6 K/UL — SIGNIFICANT CHANGE UP (ref 0–0.9)
MONOCYTES NFR BLD AUTO: 7.5 % — SIGNIFICANT CHANGE UP (ref 2–14)
NEUTROPHILS # BLD AUTO: 5.2 K/UL — SIGNIFICANT CHANGE UP (ref 1.8–7.4)
NEUTROPHILS NFR BLD AUTO: 60.2 % — SIGNIFICANT CHANGE UP (ref 43–77)
PLATELET # BLD AUTO: 234 K/UL — SIGNIFICANT CHANGE UP (ref 150–400)
POTASSIUM SERPL-MCNC: 3.2 MMOL/L — LOW (ref 3.5–5.3)
POTASSIUM SERPL-SCNC: 3.2 MMOL/L — LOW (ref 3.5–5.3)
PROT SERPL-MCNC: 6.9 G/DL — SIGNIFICANT CHANGE UP (ref 6–8.3)
RBC # BLD: 5.29 M/UL — SIGNIFICANT CHANGE UP (ref 4.2–5.8)
RBC # FLD: 13.5 % — SIGNIFICANT CHANGE UP (ref 10.3–14.5)
SODIUM SERPL-SCNC: 138 MMOL/L — SIGNIFICANT CHANGE UP (ref 135–145)
TOTAL CHOLESTEROL/HDL RATIO MEASUREMENT: 4.5 RATIO — SIGNIFICANT CHANGE UP (ref 3.4–9.6)
TRIGL SERPL-MCNC: 122 MG/DL — SIGNIFICANT CHANGE UP (ref 10–149)
WBC # BLD: 8.6 K/UL — SIGNIFICANT CHANGE UP (ref 3.8–10.5)
WBC # FLD AUTO: 8.6 K/UL — SIGNIFICANT CHANGE UP (ref 3.8–10.5)

## 2018-10-03 PROCEDURE — 70547 MR ANGIOGRAPHY NECK W/O DYE: CPT | Mod: 26

## 2018-10-03 PROCEDURE — 70544 MR ANGIOGRAPHY HEAD W/O DYE: CPT | Mod: 26,59

## 2018-10-03 PROCEDURE — 70551 MRI BRAIN STEM W/O DYE: CPT | Mod: 26

## 2018-10-03 PROCEDURE — 99291 CRITICAL CARE FIRST HOUR: CPT

## 2018-10-03 PROCEDURE — 70450 CT HEAD/BRAIN W/O DYE: CPT | Mod: 26

## 2018-10-03 RX ORDER — DEXTROSE 50 % IN WATER 50 %
25 SYRINGE (ML) INTRAVENOUS ONCE
Qty: 0 | Refills: 0 | Status: DISCONTINUED | OUTPATIENT
Start: 2018-10-03 | End: 2018-10-05

## 2018-10-03 RX ORDER — DEXTROSE 50 % IN WATER 50 %
15 SYRINGE (ML) INTRAVENOUS ONCE
Qty: 0 | Refills: 0 | Status: DISCONTINUED | OUTPATIENT
Start: 2018-10-03 | End: 2018-10-05

## 2018-10-03 RX ORDER — INSULIN LISPRO 100/ML
VIAL (ML) SUBCUTANEOUS
Qty: 0 | Refills: 0 | Status: DISCONTINUED | OUTPATIENT
Start: 2018-10-03 | End: 2018-10-05

## 2018-10-03 RX ORDER — POTASSIUM CHLORIDE 20 MEQ
40 PACKET (EA) ORAL ONCE
Qty: 0 | Refills: 0 | Status: COMPLETED | OUTPATIENT
Start: 2018-10-03 | End: 2018-10-03

## 2018-10-03 RX ORDER — POTASSIUM CHLORIDE 20 MEQ
40 PACKET (EA) ORAL
Qty: 0 | Refills: 0 | Status: COMPLETED | OUTPATIENT
Start: 2018-10-03 | End: 2018-10-03

## 2018-10-03 RX ORDER — DEXTROSE 50 % IN WATER 50 %
12.5 SYRINGE (ML) INTRAVENOUS ONCE
Qty: 0 | Refills: 0 | Status: DISCONTINUED | OUTPATIENT
Start: 2018-10-03 | End: 2018-10-05

## 2018-10-03 RX ORDER — GLUCAGON INJECTION, SOLUTION 0.5 MG/.1ML
1 INJECTION, SOLUTION SUBCUTANEOUS ONCE
Qty: 0 | Refills: 0 | Status: DISCONTINUED | OUTPATIENT
Start: 2018-10-03 | End: 2018-10-05

## 2018-10-03 RX ORDER — SODIUM CHLORIDE 9 MG/ML
1000 INJECTION, SOLUTION INTRAVENOUS
Qty: 0 | Refills: 0 | Status: DISCONTINUED | OUTPATIENT
Start: 2018-10-03 | End: 2018-10-05

## 2018-10-03 RX ORDER — LOSARTAN POTASSIUM 100 MG/1
25 TABLET, FILM COATED ORAL DAILY
Qty: 0 | Refills: 0 | Status: DISCONTINUED | OUTPATIENT
Start: 2018-10-03 | End: 2018-10-05

## 2018-10-03 RX ORDER — ACETAMINOPHEN 500 MG
650 TABLET ORAL EVERY 6 HOURS
Qty: 0 | Refills: 0 | Status: DISCONTINUED | OUTPATIENT
Start: 2018-10-03 | End: 2018-10-05

## 2018-10-03 RX ADMIN — ATORVASTATIN CALCIUM 80 MILLIGRAM(S): 80 TABLET, FILM COATED ORAL at 21:58

## 2018-10-03 RX ADMIN — Medication 40 MILLIEQUIVALENT(S): at 21:58

## 2018-10-03 RX ADMIN — Medication 100 MILLIEQUIVALENT(S): at 00:53

## 2018-10-03 RX ADMIN — MONTELUKAST 10 MILLIGRAM(S): 4 TABLET, CHEWABLE ORAL at 18:32

## 2018-10-03 RX ADMIN — Medication 100 MILLIEQUIVALENT(S): at 02:36

## 2018-10-03 RX ADMIN — BUDESONIDE AND FORMOTEROL FUMARATE DIHYDRATE 2 PUFF(S): 160; 4.5 AEROSOL RESPIRATORY (INHALATION) at 06:47

## 2018-10-03 RX ADMIN — Medication 650 MILLIGRAM(S): at 23:43

## 2018-10-03 RX ADMIN — BUDESONIDE AND FORMOTEROL FUMARATE DIHYDRATE 2 PUFF(S): 160; 4.5 AEROSOL RESPIRATORY (INHALATION) at 21:58

## 2018-10-03 RX ADMIN — Medication 40 MILLIEQUIVALENT(S): at 07:03

## 2018-10-03 RX ADMIN — LOSARTAN POTASSIUM 25 MILLIGRAM(S): 100 TABLET, FILM COATED ORAL at 22:40

## 2018-10-03 RX ADMIN — Medication 40 MILLIEQUIVALENT(S): at 17:39

## 2018-10-03 NOTE — PROGRESS NOTE ADULT - SUBJECTIVE AND OBJECTIVE BOX
Subjective: Patient seen and examined. No new events except as noted.   remains in Stroke unit   no cp or sob     REVIEW OF SYSTEMS:    CONSTITUTIONAL: + weakness, fevers or chills  EYES/ENT: No visual changes;  No vertigo or throat pain   NECK: No pain or stiffness  RESPIRATORY: No cough, wheezing, hemoptysis; No shortness of breath  CARDIOVASCULAR: No chest pain or palpitations  GASTROINTESTINAL: No abdominal or epigastric pain. No nausea, vomiting, or hematemesis; No diarrhea or constipation. No melena or hematochezia.  GENITOURINARY: No dysuria, frequency or hematuria  NEUROLOGICAL: No numbness or weakness  SKIN: No itching, burning, rashes, or lesions   All other review of systems is negative unless indicated above.    MEDICATIONS:  MEDICATIONS  (STANDING):  atorvastatin 80 milliGRAM(s) Oral at bedtime  buDESOnide 160 MICROgram(s)/formoterol 4.5 MICROgram(s) Inhaler 2 Puff(s) Inhalation two times a day  montelukast 10 milliGRAM(s) Oral daily  potassium chloride   Powder 40 milliEquivalent(s) Oral every 2 hours  sodium chloride 0.9%. 1000 milliLiter(s) (60 mL/Hr) IV Continuous <Continuous>      PHYSICAL EXAM:  T(C): 36.7 (10-02-18 @ 20:36), Max: 37.4 (10-02-18 @ 19:45)  HR: 64 (10-03-18 @ 06:00) (60 - 104)  BP: 161/104 (10-03-18 @ 06:00) (122/74 - 177/101)  RR: 15 (10-03-18 @ 06:00) (12 - 18)  SpO2: 95% (10-03-18 @ 06:00) (95% - 100%)  Wt(kg): --  I&O's Summary    02 Oct 2018 07:01  -  03 Oct 2018 07:00  --------------------------------------------------------  IN: 840 mL / OUT: 1200 mL / NET: -360 mL      Height (cm): 175.26 (10-02 @ 17:49)  Weight (kg): 89.4 (10-02 @ 17:49)  BMI (kg/m2): 29.1 (10-02 @ 17:49)  BSA (m2): 2.05 (10-02 @ 17:49)    Appearance: Normal	  HEENT:   Normal oral mucosa, PERRL, EOMI	  Lymphatic: No lymphadenopathy , no edema  Cardiovascular: Normal S1 S2, No JVD, No murmurs , Peripheral pulses palpable 2+ bilaterally  Respiratory: Lungs clear to auscultation, normal effort 	  Gastrointestinal:  Soft, Non-tender, + BS	  Skin: No rashes, No ecchymoses, No cyanosis, warm to touch  Musculoskeletal: Normal range of motion, normal strength  Psychiatry:  Mood & affect appropriate  Ext: No edema  neuro; no focal deficits     LABS:    CARDIAC MARKERS:                                14.9   8.6   )-----------( 234      ( 03 Oct 2018 05:54 )             43.6     10-03    138  |  102  |  20  ----------------------------<  122<H>  3.2<L>   |  23  |  1.15    Ca    9.5      03 Oct 2018 05:54    TPro  6.9  /  Alb  4.1  /  TBili  0.4  /  DBili  x   /  AST  15  /  ALT  14  /  AlkPhos  57  10-03    proBNP:   Lipid Profile:   HgA1c: Hemoglobin A1C, Whole Blood: 8.1 % (10-03 @ 07:43)    TSH:             TELEMETRY: SR	    ECG:  	  RADIOLOGY:   DIAGNOSTIC TESTING:  [ ] Echocardiogram:  [ ]  Catheterization:  [ ] Stress Test:    OTHER:

## 2018-10-03 NOTE — PROVIDER CONTACT NOTE (OTHER) - ASSESSMENT
pt alert and oriented. pt requesting to eat and drink. s/p tPA, pt verbalize understanding of medication bleeding risk. MD Deleon and NP notified.

## 2018-10-03 NOTE — PROGRESS NOTE ADULT - PROBLEM SELECTOR PLAN 1
Awaiting MRI   Orders per Stroke unit   ASA and statin. Awaiting MRI   Orders per Stroke unit   ASA and statin.  CHANDLER

## 2018-10-03 NOTE — PROVIDER CONTACT NOTE (OTHER) - ASSESSMENT
pt is alert and oriented elevated b/p 185/100 left arm, repeat with location change right arm 183/104, 188/102, complain of headache 2/10 pt is alert and oriented elevated b/p 185/100 left arm, repeat with location change right arm 183/104, 188/102, hr 83 complain of headache 2/10

## 2018-10-04 LAB
ANION GAP SERPL CALC-SCNC: 9 MMOL/L — SIGNIFICANT CHANGE UP (ref 5–17)
BUN SERPL-MCNC: 24 MG/DL — HIGH (ref 7–23)
CALCIUM SERPL-MCNC: 8.8 MG/DL — SIGNIFICANT CHANGE UP (ref 8.4–10.5)
CHLORIDE SERPL-SCNC: 103 MMOL/L — SIGNIFICANT CHANGE UP (ref 96–108)
CO2 SERPL-SCNC: 24 MMOL/L — SIGNIFICANT CHANGE UP (ref 22–31)
CREAT SERPL-MCNC: 1.18 MG/DL — SIGNIFICANT CHANGE UP (ref 0.5–1.3)
GLUCOSE BLDC GLUCOMTR-MCNC: 113 MG/DL — HIGH (ref 70–99)
GLUCOSE BLDC GLUCOMTR-MCNC: 125 MG/DL — HIGH (ref 70–99)
GLUCOSE BLDC GLUCOMTR-MCNC: 145 MG/DL — HIGH (ref 70–99)
GLUCOSE BLDC GLUCOMTR-MCNC: 262 MG/DL — HIGH (ref 70–99)
GLUCOSE SERPL-MCNC: 150 MG/DL — HIGH (ref 70–99)
HCT VFR BLD CALC: 44.4 % — SIGNIFICANT CHANGE UP (ref 39–50)
HGB BLD-MCNC: 14.9 G/DL — SIGNIFICANT CHANGE UP (ref 13–17)
MCHC RBC-ENTMCNC: 28 PG — SIGNIFICANT CHANGE UP (ref 27–34)
MCHC RBC-ENTMCNC: 33.6 GM/DL — SIGNIFICANT CHANGE UP (ref 32–36)
MCV RBC AUTO: 83.5 FL — SIGNIFICANT CHANGE UP (ref 80–100)
PLATELET # BLD AUTO: 231 K/UL — SIGNIFICANT CHANGE UP (ref 150–400)
POTASSIUM SERPL-MCNC: 3.7 MMOL/L — SIGNIFICANT CHANGE UP (ref 3.5–5.3)
POTASSIUM SERPL-SCNC: 3.7 MMOL/L — SIGNIFICANT CHANGE UP (ref 3.5–5.3)
RBC # BLD: 5.31 M/UL — SIGNIFICANT CHANGE UP (ref 4.2–5.8)
RBC # FLD: 13.4 % — SIGNIFICANT CHANGE UP (ref 10.3–14.5)
SODIUM SERPL-SCNC: 136 MMOL/L — SIGNIFICANT CHANGE UP (ref 135–145)
WBC # BLD: 9 K/UL — SIGNIFICANT CHANGE UP (ref 3.8–10.5)
WBC # FLD AUTO: 9 K/UL — SIGNIFICANT CHANGE UP (ref 3.8–10.5)

## 2018-10-04 PROCEDURE — 93306 TTE W/DOPPLER COMPLETE: CPT | Mod: 26

## 2018-10-04 PROCEDURE — 99233 SBSQ HOSP IP/OBS HIGH 50: CPT

## 2018-10-04 RX ORDER — ASPIRIN/CALCIUM CARB/MAGNESIUM 324 MG
81 TABLET ORAL DAILY
Qty: 0 | Refills: 0 | Status: DISCONTINUED | OUTPATIENT
Start: 2018-10-04 | End: 2018-10-05

## 2018-10-04 RX ORDER — HYDROCHLOROTHIAZIDE 25 MG
12.5 TABLET ORAL DAILY
Qty: 0 | Refills: 0 | Status: DISCONTINUED | OUTPATIENT
Start: 2018-10-04 | End: 2018-10-05

## 2018-10-04 RX ADMIN — BUDESONIDE AND FORMOTEROL FUMARATE DIHYDRATE 2 PUFF(S): 160; 4.5 AEROSOL RESPIRATORY (INHALATION) at 08:00

## 2018-10-04 RX ADMIN — Medication 12.5 MILLIGRAM(S): at 21:09

## 2018-10-04 RX ADMIN — MONTELUKAST 10 MILLIGRAM(S): 4 TABLET, CHEWABLE ORAL at 15:36

## 2018-10-04 RX ADMIN — Medication 81 MILLIGRAM(S): at 15:36

## 2018-10-04 RX ADMIN — ATORVASTATIN CALCIUM 80 MILLIGRAM(S): 80 TABLET, FILM COATED ORAL at 21:09

## 2018-10-04 RX ADMIN — Medication 650 MILLIGRAM(S): at 00:13

## 2018-10-04 RX ADMIN — LOSARTAN POTASSIUM 25 MILLIGRAM(S): 100 TABLET, FILM COATED ORAL at 06:28

## 2018-10-04 RX ADMIN — BUDESONIDE AND FORMOTEROL FUMARATE DIHYDRATE 2 PUFF(S): 160; 4.5 AEROSOL RESPIRATORY (INHALATION) at 06:28

## 2018-10-04 NOTE — PHYSICAL THERAPY INITIAL EVALUATION ADULT - LEVEL OF INDEPENDENCE: STAIR NEGOTIATION, REHAB EVAL
Alert and oriented to person, place, time/situation. normal mood and affect. no apparent risk to self or others.
independent

## 2018-10-04 NOTE — PROGRESS NOTE ADULT - SUBJECTIVE AND OBJECTIVE BOX
Subjective: Patient seen and examined. No new events except as noted.   remains in Stroke unit   Vision improved     REVIEW OF SYSTEMS:    CONSTITUTIONAL: + weakness, fevers or chills  EYES/ENT: No visual changes;  No vertigo or throat pain   NECK: No pain or stiffness  RESPIRATORY: No cough, wheezing, hemoptysis; No shortness of breath  CARDIOVASCULAR: No chest pain or palpitations  GASTROINTESTINAL: No abdominal or epigastric pain. No nausea, vomiting, or hematemesis; No diarrhea or constipation. No melena or hematochezia.  GENITOURINARY: No dysuria, frequency or hematuria  NEUROLOGICAL: No numbness or weakness  SKIN: No itching, burning, rashes, or lesions   All other review of systems is negative unless indicated above.    MEDICATIONS:  MEDICATIONS  (STANDING):  atorvastatin 80 milliGRAM(s) Oral at bedtime  buDESOnide 160 MICROgram(s)/formoterol 4.5 MICROgram(s) Inhaler 2 Puff(s) Inhalation two times a day  dextrose 5%. 1000 milliLiter(s) (50 mL/Hr) IV Continuous <Continuous>  dextrose 50% Injectable 12.5 Gram(s) IV Push once  dextrose 50% Injectable 25 Gram(s) IV Push once  dextrose 50% Injectable 25 Gram(s) IV Push once  insulin lispro (HumaLOG) corrective regimen sliding scale   SubCutaneous three times a day before meals  losartan 25 milliGRAM(s) Oral daily  montelukast 10 milliGRAM(s) Oral daily  sodium chloride 0.9%. 1000 milliLiter(s) (60 mL/Hr) IV Continuous <Continuous>      PHYSICAL EXAM:  T(C): 36.8 (10-04-18 @ 08:25), Max: 37.3 (10-03-18 @ 15:47)  HR: 80 (10-04-18 @ 06:11) (69 - 94)  BP: 152/102 (10-04-18 @ 06:11) (146/86 - 188/102)  RR: 15 (10-04-18 @ 06:11) (15 - 23)  SpO2: 100% (10-04-18 @ 06:11) (93% - 100%)  Wt(kg): --  I&O's Summary    03 Oct 2018 07:01  -  04 Oct 2018 07:00  --------------------------------------------------------  IN: 480 mL / OUT: 0 mL / NET: 480 mL          Appearance: NAD	  HEENT:   Normal oral mucosa, PERRL, EOMI	  Lymphatic: No lymphadenopathy , no edema  Cardiovascular: Normal S1 S2, No JVD, No murmurs , Peripheral pulses palpable 2+ bilaterally  Respiratory: Lungs clear to auscultation, normal effort 	  Gastrointestinal:  Soft, Non-tender, + BS	  Skin: No rashes, No ecchymoses, No cyanosis, warm to touch  Musculoskeletal: Normal range of motion, normal strength  Psychiatry:  Mood & affect appropriate  Ext: No edema  NEUROLOGICAL EXAM:  Mental status: Awake, alert, oriented x3, no aphasia, no neglect, normal memory   Cranial Nerves: RHH, No facial asymmetry, no nystagmus, no dysarthria,  tongue midline  Motor exam: Normal tone, no drift, 5/5 RUE, 5/5 RLE, 5/5 LUE, 5/5 LLE, normal fine finger movements.  Sensation: Intact to light touch   Coordination/ Gait: No dysmetria, ISSAC intact and symmetric bilaterally    LABS:    CARDIAC MARKERS:                                14.9   9.0   )-----------( 231      ( 04 Oct 2018 06:57 )             44.4     10-04    136  |  103  |  24<H>  ----------------------------<  150<H>  3.7   |  24  |  1.18    Ca    8.8      04 Oct 2018 06:57    TPro  6.9  /  Alb  4.1  /  TBili  0.4  /  DBili  x   /  AST  15  /  ALT  14  /  AlkPhos  57  10-03    proBNP:   Lipid Profile:   HgA1c:   TSH:             TELEMETRY: SR	    ECG:  	  RADIOLOGY:  < from: MR Head No Cont (10.03.18 @ 22:06) >    EXAM:  MR ANGIO NECK                          EXAM:  MR ANGIO BRAIN                          EXAM:  MR BRAIN                            PROCEDURE DATE:  10/03/2018            INTERPRETATION:  HISTORY: Right homogenous hemianopia    MRI OF THE BRAIN, MRA OF THE [place] OF [place], AND MRA OF THE CERVICAL   ARTERIES:    TECHNIQUE:     The examination consists of:  1) Axial SPGR   2) Axial FSE T2-weighted   3) Axial FLAIR  4) Sagittal T1 FLAIR  5) Axial GRE and SWI  6) Axial diffusion-weighted   7) 3D time-of-flight MRA of the [place]  8) 2D time-of-flight MRA of the cervical arteries   9) 3D MRA of the carotid artery bifurcations    Study is read and compared with the prior study 10/2/2018 which is CT as   well as a prior MR 10/8/2015      FINDINGS:    Evidence of an acute infarct in the left PCA territory is appreciated on   the diffusion-weighted imaging with associated hemorrhagic   transformation. Mass effect on the atria and posterior body of the left   lateral ventricle is appreciated. Susceptibility weighted imaging   demonstrates the presence of associated susceptibility at the level of   the infarct as well as evidence of hemorrhage layering in the occipital   horn of the right lateral ventricle. There is also evidenceof chronic   hemorrhage in the right thalamic region associated with a prior right   thalamic infarct that was appreciated on the patient's prior MR   10/8/2015. Old right cerebellar infarct. Microvascular ischemic changes   involving the periventricular and subcortical white matter.    The MR angiographic evaluation of the [place] demonstrates the   internal carotid arteries to be patent.  The anterior, middle, and   posterior cerebral arteries are unremarkable.  The vertebrobasilar   confluence is unremarkable.  The basilar artery is patent.      The MR angiographic evaluation of the cervical arteries demonstrates the   internal carotid arteries and vertebral arteries to be patent.        IMPRESSION:      1. Evidence of an acute left PCA territory infarct with associated   hemorrhage and mass effect on the atria and occipital horn of the left   lateral ventricle. Intraventricular extension of the hemorrhage is   suggested with a component of hemorrhage in the right occipital horn of   the lateral ventricle appreciated. Old right cerebellar infarct.    2. Unremarkable MR angiographic evaluation of the cervical arteries and   [place].    Dr. Saravia discussed these findings with Dr. Geronimo on 10/4/2018 8:59   AM with read back.                     TO SARAVIA M.D., ATTENDING RADIOLOGIST  This document has been electronically signed. Oct  4 2018  9:02AM                < end of copied text >    DIAGNOSTIC TESTING:  [ ] Echocardiogram:    [ ]  Catheterization:  [ ] Stress Test:    OTHER:

## 2018-10-04 NOTE — PHYSICAL THERAPY INITIAL EVALUATION ADULT - PERTINENT HX OF CURRENT PROBLEM, REHAB EVAL
54yo M presented with headache and vision loss. Last known normal was 3:20pm on 10/2. Around 3:30pm, he was taking something out of the car when he noted a left sided headache associated with vision loss on the right side of his vision. Neuro exam showed right homonymous hemianopsia. CTH negative for acute pathology. NIHSS 2 preMRS 0tPA bolus give 10/2/18 at 18:33.

## 2018-10-04 NOTE — PROGRESS NOTE ADULT - PROBLEM SELECTOR PLAN 1
Reviewed MRI   suggestive of embolic etiology as per Stroke attending   ASA and statin.  CHANDLER and possible ILR  Keep NPO after midnight

## 2018-10-04 NOTE — PHYSICAL THERAPY INITIAL EVALUATION ADULT - PRECAUTIONS/LIMITATIONS, REHAB EVAL
acute left PCA territory infarct with associated no known precautions/limitations/acute left PCA territory infarct with associated

## 2018-10-04 NOTE — PROGRESS NOTE ADULT - ASSESSMENT
53 year old right handed Sammarinese [place]ian male presented with headache and vision loss. 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 Last known normal was 3:20pm on 10/2. Around 3:30pm, he was taking something out of the car when he noted a left sided headache associated with vision loss on the right side of his vision. Denies any numbness, tingling, nausea, vomiting, weakness.  Neuro exam showed right homonymous hemianopsiaCTH negative for acute pathology  NIHSS 2 preMRS 0tPA bolus give 10/2/18 at 18:33    Impression: Cerebral embolism with cerebral infarction. Left PCA distribution stroke - likely etiology being cryptogenic stroke, probably related to embolism from a proximal source like cardiac/paradoxical source of embolism but possibility of large vessel disease needs to be ruled out      NEURO: Continue close monitoring for neurologic deterioration, permissive HTN, titrate statin to LDL goal less than 70, MRI Brain w/o completed, results pending. Physical therapy/OT pending    ANTITHROMBOTIC THERAPY: pending MRI/CT scan results    PULMONARY: protecting airway, saturating well     CARDIOVASCULAR: check TTE, cardiac monitoring                              SBP goal: permissive HTN for 24 hours up to 185/105 followed by gradual normotension    GASTROINTESTINAL:  dysphagia screen - passed, tolerating diet      Diet: Regular, DASH    RENAL: BUN/Cr stable, good urine output, hypokalemic- pending BMP from this am.      Na Goal: Greater than 135     Llamas: N    HEMATOLOGY: H/H stable, no signs of active bleeding, Platelets normal      DVT ppx: venodynes at this point and will order pharm DVT prophylaxis pending CT/MRI results    ID: afebrile, no leukocytosis     OTHER: All question and concerns addressed.    DISPOSITION: Rehab or home depending on PT eval once stable and workup is complete      CORE MEASURES:        Admission NIHSS: 2     TPA: [x] YES [] NO      LDL/HDL: 96/34     Depression Screen: p     Statin Therapy: Y     Dysphagia Screen: [x] PASS [] FAIL     Smoking [] YES [x] NO      Afib [] YES [x] NO     Stroke Education [x] YES [] NO 53 year old right handed Japanese Wallisian male presented with headache and vision loss. 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 Last known normal was 3:20pm on 10/2. Around 3:30pm, he was taking something out of the car when he noted a left sided headache associated with vision loss on the right side of his vision. Denies any numbness, tingling, nausea, vomiting, weakness.  Neuro exam showed right homonymous hemianopsiaCTH negative for acute pathology  NIHSS 2 preMRS 0tPA bolus give 10/2/18 at 18:33    Impression: Cerebral embolism with cerebral infarction. Left PCA distribution stroke - likely etiology being cryptogenic stroke, probably related to embolism from a proximal source like cardiac/paradoxical source of embolism but possibility of large vessel disease needs to be ruled out      NEURO: Continue close monitoring for neurologic deterioration, permissive HTN, titrate statin to LDL goal less than 70, MRI Brain w/o completed, results pending. Physical therapy/OT recommends: home no skilled needs    ANTITHROMBOTIC THERAPY: pending MRI/CT scan results    PULMONARY: protecting airway, saturating well     CARDIOVASCULAR: TTE:  Left ventricular enlargement.Moderate to severe segmental left ventricular systolicdysfunction, No left ventricular thrombus. Mild diastolic dysfunction (Stage I).cardiac monitoring, plan for ILR to rule out PAF                          SBP goal: permissive HTN for 24 hours up to 185/105 followed by gradual normotension    GASTROINTESTINAL:  dysphagia screen - passed, tolerating diet      Diet: Regular, DASH    RENAL: BUN/Cr stable, good urine output, hypokalemic- pending BMP from this am.      Na Goal: Greater than 135     Llamas: N    HEMATOLOGY: H/H stable, no signs of active bleeding, Platelets normal      DVT ppx: venodynes at this point and will order pharm DVT prophylaxis pending CT/MRI results    ID: afebrile, no leukocytosis     OTHER: All question and concerns addressed.    DISPOSITION: Home with no skilled needs      CORE MEASURES:        Admission NIHSS: 2     TPA: [x] YES [] NO      LDL/HDL: 96/34     Depression Screen: p     Statin Therapy: Y     Dysphagia Screen: [x] PASS [] FAIL     Smoking [] YES [x] NO      Afib [] YES [x] NO     Stroke Education [x] YES [] NO 53 year old right handed Gabonese Ghanaian male presented with headache and vision loss. 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 Last known normal was 3:20pm on 10/2. Around 3:30pm, he was taking something out of the car when he noted a left sided headache associated with vision loss on the right side of his vision. Denies any numbness, tingling, nausea, vomiting, weakness.  Neuro exam showed right homonymous hemianopsiaCTH negative for acute pathology  NIHSS 2 preMRS 0tPA bolus give 10/2/18 at 18:33    Impression: Cerebral embolism with cerebral infarction. Left PCA distribution stroke - likely etiology being cryptogenic stroke, probably related to embolism from a proximal source like cardiac/paradoxical source of embolism but possibility of large vessel disease needs to be ruled out      NEURO: Continue close monitoring for neurologic deterioration, permissive HTN, titrate statin to LDL goal less than 70, MRI Brain w/o completed, results pending. Physical therapy/OT recommends: home no skilled needs    ANTITHROMBOTIC THERAPY: ASA    PULMONARY: protecting airway, saturating well     CARDIOVASCULAR: TTE:  Left ventricular enlargement.Moderate to severe segmental left ventricular systolicdysfunction, No left ventricular thrombus. Mild diastolic dysfunction (Stage I).cardiac monitoring, plan for ILR to rule out PAF                          SBP goal: permissive HTN for 24 hours up to 185/105 followed by gradual normotension    GASTROINTESTINAL:  dysphagia screen - passed, tolerating diet      Diet: Regular, DASH    RENAL: BUN/Cr stable, good urine output, hypokalemic- pending BMP from this am.      Na Goal: Greater than 135     Llamas: N    HEMATOLOGY: H/H stable, no signs of active bleeding, Platelets normal      DVT ppx: venodynes     ID: afebrile, no leukocytosis     OTHER: All question and concerns addressed.    DISPOSITION: Home with no skilled needs      CORE MEASURES:        Admission NIHSS: 2     TPA: [x] YES [] NO      LDL/HDL: 96/34     Depression Screen: p     Statin Therapy: Y     Dysphagia Screen: [x] PASS [] FAIL     Smoking [] YES [x] NO      Afib [] YES [x] NO     Stroke Education [x] YES [] NO 53 years old man with multiple vascular risk factors including age, HTN, pre-DM II and right thalamocapsular ICH in 2008 is evaluated at [facility] for acute onset of right-sided binocular visual loss with last known well being at 3:20 PM on 10/2. CT brain admission did not show any evidence of acute infarct or hemorrhage. After discussing the risks and benefits (especially in the setting of prior ICH), he was treated with IV tPA as benefits were thought to outweigh potential risks. CT brain/MRI brain subsequently showed left PCA distribution infarct with asymptomatic hemorrhagic transformation (PH-2). MRA head and neck was grossly unremarkable.     Impression:  Cerebral embolism with cerebral infarction. Left PCA distribution stroke with mild hemorrhagic transformation (PH-2) - likely etiology being cryptogenic stroke, probably related to embolism from a proximal source like cardiac/paradoxical source of embolism     NEURO: Continue close monitoring for neurologic deterioration, BP goal<160/90 mmHg in the setting of hemorrhagic transformation, continue with home statuin medications as per home dose if applicable, considering likely non-atheroembolic etiology of his stroke; MRI Brain w/o completed. Physical therapy/OT recommends: home no skilled needs    ANTITHROMBOTIC THERAPY: ASA for secondary stroke prevention in the setting of asymptomatic hemorrhagic transformation of ischemic stroke     PULMONARY: protecting airway, saturating well     CARDIOVASCULAR: TTE:  Left ventricular enlargement. Moderate to severe segmental left ventricular systolic dysfunction, No left ventricular thrombus. Mild diastolic dysfunction (Stage I).cardiac monitoring, plan for ICM placement for prolonged cardiac monitoring to screen for occult cardiac arrhythmias like atrial fibrillation being the cardiac source of embolism as it could potentially change the measures of secondary stroke prevention in his case      SBP goal: gradual normotension    GASTROINTESTINAL:  dysphagia screen - passed, tolerating diet      Diet: Regular, DASH    RENAL: BUN/Cr stable, good urine output, hypokalemic - pending BMP from this am.      Na Goal: Greater than 135     Llamas: N    HEMATOLOGY: H/H stable, no signs of active bleeding, Platelets normal      DVT ppx: venodynes     ID: afebrile, no leukocytosis     OTHER: All question and concerns addressed. Case discussed with his PCP as per patient's request.     DISPOSITION: Home with no skilled needs    CORE MEASURES:        Admission NIHSS: 2     TPA: [x] YES [] NO      LDL/HDL: 96/34     Depression Screen: p     Statin Therapy: Y     Dysphagia Screen: [x] PASS [] FAIL     Smoking [] YES [x] NO      Afib [] YES [x] NO     Stroke Education [x] YES [] NO

## 2018-10-04 NOTE — PROGRESS NOTE ADULT - SUBJECTIVE AND OBJECTIVE BOX
THE PATIENT WAS SEEN AND EXAMINED BY ME WITH THE HOUSESTAFF AND STROKE TEAM DURING MORNING ROUNDS.   HPI:  53 year old right handed [place] male presented with headache and vision loss. 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 Last known normal was 3:20pm on 10/2. Around 3:30pm, he was taking something out of the car when he noted a left sided headache associated with vision loss on the right side of his vision. Denies any numbness, tingling, nausea, vomiting, weakness.  Neuro exam showed right homonymous hemianopsiaCTH negative for acute pathology  NIHSS 2 preMRS 0tPA bolus give 10/2/18 at 18:33    SUBJECTIVE: No events overnight.  No new neurologic complaints.      acetaminophen   Tablet .. 650 milliGRAM(s) Oral every 6 hours PRN  atorvastatin 80 milliGRAM(s) Oral at bedtime  buDESOnide 160 MICROgram(s)/formoterol 4.5 MICROgram(s) Inhaler 2 Puff(s) Inhalation two times a day  dextrose 40% Gel 15 Gram(s) Oral once PRN  dextrose 5%. 1000 milliLiter(s) IV Continuous <Continuous>  dextrose 50% Injectable 12.5 Gram(s) IV Push once  dextrose 50% Injectable 25 Gram(s) IV Push once  dextrose 50% Injectable 25 Gram(s) IV Push once  glucagon  Injectable 1 milliGRAM(s) IntraMuscular once PRN  insulin lispro (HumaLOG) corrective regimen sliding scale   SubCutaneous three times a day before meals  losartan 25 milliGRAM(s) Oral daily  montelukast 10 milliGRAM(s) Oral daily  sodium chloride 0.9%. 1000 milliLiter(s) IV Continuous <Continuous>      PHYSICAL EXAM:   Vital Signs Last 24 Hrs  T(C): 36.9 (04 Oct 2018 06:11), Max: 37.3 (03 Oct 2018 15:47)  T(F): 98.5 (04 Oct 2018 06:11), Max: 99.1 (03 Oct 2018 15:47)  HR: 80 (04 Oct 2018 06:11) (69 - 94)  BP: 152/102 (04 Oct 2018 06:11) (146/86 - 188/102)  BP(mean): 115 (04 Oct 2018 06:11) (103 - 128)  RR: 15 (04 Oct 2018 06:11) (15 - 23)  SpO2: 100% (04 Oct 2018 06:11) (93% - 100%)    General: No acute distress  HEENT: RHH, EOMI  Abdomen: Soft, nontender, nondistended   Extremities: No edema    NEUROLOGICAL EXAM:  Mental status: Awake, alert, oriented x3, no aphasia, no neglect, normal memory   Cranial Nerves: RHH, No facial asymmetry, no nystagmus, no dysarthria,  tongue midline  Motor exam: Normal tone, no drift, 5/5 RUE, 5/5 RLE, 5/5 LUE, 5/5 LLE, normal fine finger movements.  Sensation: Intact to light touch   Coordination/ Gait: No dysmetria, ISSAC intact and symmetric bilaterally    LABS:                        14.9   8.6   )-----------( 234      ( 03 Oct 2018 05:54 )             43.6    10-03    138  |  102  |  20  ----------------------------<  122<H>  3.2<L>   |  23  |  1.15    Ca    9.5      03 Oct 2018 05:54    TPro  6.9  /  Alb  4.1  /  TBili  0.4  /  DBili  x   /  AST  15  /  ALT  14  /  AlkPhos  57  10-03  PT/INR - ( 02 Oct 2018 18:20 )   PT: 11.9 sec;   INR: 1.09 ratio         PTT - ( 02 Oct 2018 18:20 )  PTT:31.5 sec  Hemoglobin A1C, Whole Blood: 8.1 % (10-03 @ 07:43)      IMAGING: Reviewed by me.   CT BRAIN (10/2/18): No intracranial hemorrhage, mass effect, or midline shift. THE PATIENT WAS SEEN AND EXAMINED BY ME WITH THE HOUSESTAFF AND STROKE TEAM DURING MORNING ROUNDS.     HPI:  53 year old right handed [place]o male presented with headache and vision loss. 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 Last known normal was 3:20pm on 10/2. Around 3:30pm, he was taking something out of the car when he noted a left sided headache associated with vision loss on the right side of his vision. Denies any numbness, tingling, nausea, vomiting, weakness.  Neuro exam showed right homonymous hemianopsiaCTH negative for acute pathology  NIHSS 2 preMRS 0tPA bolus give 10/2/18 at 18:33    SUBJECTIVE: No events overnight.  No new neurologic complaints.      ROS: All negative except documented above.    acetaminophen   Tablet .. 650 milliGRAM(s) Oral every 6 hours PRN  atorvastatin 80 milliGRAM(s) Oral at bedtime  buDESOnide 160 MICROgram(s)/formoterol 4.5 MICROgram(s) Inhaler 2 Puff(s) Inhalation two times a day  dextrose 40% Gel 15 Gram(s) Oral once PRN  dextrose 5%. 1000 milliLiter(s) IV Continuous <Continuous>  dextrose 50% Injectable 12.5 Gram(s) IV Push once  dextrose 50% Injectable 25 Gram(s) IV Push once  dextrose 50% Injectable 25 Gram(s) IV Push once  glucagon  Injectable 1 milliGRAM(s) IntraMuscular once PRN  insulin lispro (HumaLOG) corrective regimen sliding scale   SubCutaneous three times a day before meals  losartan 25 milliGRAM(s) Oral daily  montelukast 10 milliGRAM(s) Oral daily  sodium chloride 0.9%. 1000 milliLiter(s) IV Continuous <Continuous>      PHYSICAL EXAM:   Vital Signs Last 24 Hrs  T(C): 36.9 (04 Oct 2018 06:11), Max: 37.3 (03 Oct 2018 15:47)  T(F): 98.5 (04 Oct 2018 06:11), Max: 99.1 (03 Oct 2018 15:47)  HR: 80 (04 Oct 2018 06:11) (69 - 94)  BP: 152/102 (04 Oct 2018 06:11) (146/86 - 188/102)  BP(mean): 115 (04 Oct 2018 06:11) (103 - 128)  RR: 15 (04 Oct 2018 06:11) (15 - 23)  SpO2: 100% (04 Oct 2018 06:11) (93% - 100%)    General: No acute distress  HEENT: RHH, EOMI  Abdomen: Soft, nontender, nondistended   Extremities: No edema    NEUROLOGICAL EXAM:  Mental status: Awake, alert, oriented x3, no aphasia, no neglect, normal memory   Cranial Nerves: R Superior quadrantanopsia, No facial asymmetry, no nystagmus, no dysarthria,  tongue midline  Motor exam: Normal tone, no drift, 5/5 RUE, 5/5 RLE, 5/5 LUE, 5/5 LLE, normal fine finger movements.  Sensation: Intact to light touch   Coordination/ Gait: No dysmetria, ISSAC intact and symmetric bilaterally    LABS:                        14.9   8.6   )-----------( 234      ( 03 Oct 2018 05:54 )             43.6    10-03    138  |  102  |  20  ----------------------------<  122<H>  3.2<L>   |  23  |  1.15    Ca    9.5      03 Oct 2018 05:54    TPro  6.9  /  Alb  4.1  /  TBili  0.4  /  DBili  x   /  AST  15  /  ALT  14  /  AlkPhos  57  10-03  PT/INR - ( 02 Oct 2018 18:20 )   PT: 11.9 sec;   INR: 1.09 ratio         PTT - ( 02 Oct 2018 18:20 )  PTT:31.5 sec  Hemoglobin A1C, Whole Blood: 8.1 % (10-03 @ 07:43)      IMAGING: Reviewed by me.   CT BRAIN (10/2/18): No intracranial hemorrhage, mass effect, or midline shift.

## 2018-10-05 VITALS
SYSTOLIC BLOOD PRESSURE: 156 MMHG | OXYGEN SATURATION: 99 % | HEART RATE: 67 BPM | DIASTOLIC BLOOD PRESSURE: 81 MMHG | RESPIRATION RATE: 18 BRPM

## 2018-10-05 LAB
ANION GAP SERPL CALC-SCNC: 10 MMOL/L — SIGNIFICANT CHANGE UP (ref 5–17)
BUN SERPL-MCNC: 18 MG/DL — SIGNIFICANT CHANGE UP (ref 7–23)
CALCIUM SERPL-MCNC: 9.2 MG/DL — SIGNIFICANT CHANGE UP (ref 8.4–10.5)
CHLORIDE SERPL-SCNC: 104 MMOL/L — SIGNIFICANT CHANGE UP (ref 96–108)
CO2 SERPL-SCNC: 23 MMOL/L — SIGNIFICANT CHANGE UP (ref 22–31)
CREAT SERPL-MCNC: 0.99 MG/DL — SIGNIFICANT CHANGE UP (ref 0.5–1.3)
GLUCOSE BLDC GLUCOMTR-MCNC: 116 MG/DL — HIGH (ref 70–99)
GLUCOSE BLDC GLUCOMTR-MCNC: 141 MG/DL — HIGH (ref 70–99)
GLUCOSE BLDC GLUCOMTR-MCNC: 176 MG/DL — HIGH (ref 70–99)
GLUCOSE SERPL-MCNC: 121 MG/DL — HIGH (ref 70–99)
HCT VFR BLD CALC: 43.3 % — SIGNIFICANT CHANGE UP (ref 39–50)
HGB BLD-MCNC: 14.7 G/DL — SIGNIFICANT CHANGE UP (ref 13–17)
MCHC RBC-ENTMCNC: 28.4 PG — SIGNIFICANT CHANGE UP (ref 27–34)
MCHC RBC-ENTMCNC: 34.1 GM/DL — SIGNIFICANT CHANGE UP (ref 32–36)
MCV RBC AUTO: 83.5 FL — SIGNIFICANT CHANGE UP (ref 80–100)
PLATELET # BLD AUTO: 208 K/UL — SIGNIFICANT CHANGE UP (ref 150–400)
POTASSIUM SERPL-MCNC: 3.5 MMOL/L — SIGNIFICANT CHANGE UP (ref 3.5–5.3)
POTASSIUM SERPL-SCNC: 3.5 MMOL/L — SIGNIFICANT CHANGE UP (ref 3.5–5.3)
RBC # BLD: 5.18 M/UL — SIGNIFICANT CHANGE UP (ref 4.2–5.8)
RBC # FLD: 13.4 % — SIGNIFICANT CHANGE UP (ref 10.3–14.5)
SODIUM SERPL-SCNC: 137 MMOL/L — SIGNIFICANT CHANGE UP (ref 135–145)
WBC # BLD: 9.4 K/UL — SIGNIFICANT CHANGE UP (ref 3.8–10.5)
WBC # FLD AUTO: 9.4 K/UL — SIGNIFICANT CHANGE UP (ref 3.8–10.5)

## 2018-10-05 PROCEDURE — C8929: CPT

## 2018-10-05 PROCEDURE — 93325 DOPPLER ECHO COLOR FLOW MAPG: CPT

## 2018-10-05 PROCEDURE — 80061 LIPID PANEL: CPT

## 2018-10-05 PROCEDURE — 70450 CT HEAD/BRAIN W/O DYE: CPT

## 2018-10-05 PROCEDURE — 96374 THER/PROPH/DIAG INJ IV PUSH: CPT

## 2018-10-05 PROCEDURE — 94640 AIRWAY INHALATION TREATMENT: CPT

## 2018-10-05 PROCEDURE — 83036 HEMOGLOBIN GLYCOSYLATED A1C: CPT

## 2018-10-05 PROCEDURE — 99291 CRITICAL CARE FIRST HOUR: CPT | Mod: 25

## 2018-10-05 PROCEDURE — 70450 CT HEAD/BRAIN W/O DYE: CPT | Mod: 26

## 2018-10-05 PROCEDURE — 80048 BASIC METABOLIC PNL TOTAL CA: CPT

## 2018-10-05 PROCEDURE — 93320 DOPPLER ECHO COMPLETE: CPT

## 2018-10-05 PROCEDURE — 84484 ASSAY OF TROPONIN QUANT: CPT

## 2018-10-05 PROCEDURE — 85610 PROTHROMBIN TIME: CPT

## 2018-10-05 PROCEDURE — 70544 MR ANGIOGRAPHY HEAD W/O DYE: CPT

## 2018-10-05 PROCEDURE — 70547 MR ANGIOGRAPHY NECK W/O DYE: CPT

## 2018-10-05 PROCEDURE — 97161 PT EVAL LOW COMPLEX 20 MIN: CPT

## 2018-10-05 PROCEDURE — 93312 ECHO TRANSESOPHAGEAL: CPT

## 2018-10-05 PROCEDURE — 93312 ECHO TRANSESOPHAGEAL: CPT | Mod: 26

## 2018-10-05 PROCEDURE — 93325 DOPPLER ECHO COLOR FLOW MAPG: CPT | Mod: 26

## 2018-10-05 PROCEDURE — 80053 COMPREHEN METABOLIC PANEL: CPT

## 2018-10-05 PROCEDURE — 97165 OT EVAL LOW COMPLEX 30 MIN: CPT

## 2018-10-05 PROCEDURE — 93005 ELECTROCARDIOGRAM TRACING: CPT

## 2018-10-05 PROCEDURE — 99233 SBSQ HOSP IP/OBS HIGH 50: CPT

## 2018-10-05 PROCEDURE — 82962 GLUCOSE BLOOD TEST: CPT

## 2018-10-05 PROCEDURE — 85027 COMPLETE CBC AUTOMATED: CPT

## 2018-10-05 PROCEDURE — 70551 MRI BRAIN STEM W/O DYE: CPT

## 2018-10-05 PROCEDURE — 85730 THROMBOPLASTIN TIME PARTIAL: CPT

## 2018-10-05 PROCEDURE — 93320 DOPPLER ECHO COMPLETE: CPT | Mod: 26

## 2018-10-05 RX ORDER — MEPOLIZUMAB 100 MG/ML
100 INJECTION, POWDER, FOR SOLUTION SUBCUTANEOUS
Qty: 0 | Refills: 0 | Status: COMPLETED | OUTPATIENT
Start: 2018-10-05

## 2018-10-05 RX ORDER — LOSARTAN POTASSIUM 100 MG/1
1 TABLET, FILM COATED ORAL
Qty: 0 | Refills: 0 | COMMUNITY

## 2018-10-05 RX ORDER — ASPIRIN/CALCIUM CARB/MAGNESIUM 324 MG
1 TABLET ORAL
Qty: 30 | Refills: 3
Start: 2018-10-05 | End: 2019-02-01

## 2018-10-05 RX ORDER — LOSARTAN POTASSIUM 100 MG/1
1 TABLET, FILM COATED ORAL
Qty: 0 | Refills: 0 | DISCHARGE
Start: 2018-10-05

## 2018-10-05 RX ADMIN — LOSARTAN POTASSIUM 25 MILLIGRAM(S): 100 TABLET, FILM COATED ORAL at 05:11

## 2018-10-05 RX ADMIN — Medication 81 MILLIGRAM(S): at 11:47

## 2018-10-05 RX ADMIN — Medication 12.5 MILLIGRAM(S): at 05:11

## 2018-10-05 RX ADMIN — MEPOLIZUMAB 0.67 MG: 100 INJECTION, POWDER, FOR SOLUTION SUBCUTANEOUS at 00:00

## 2018-10-05 RX ADMIN — MONTELUKAST 10 MILLIGRAM(S): 4 TABLET, CHEWABLE ORAL at 11:47

## 2018-10-05 RX ADMIN — BUDESONIDE AND FORMOTEROL FUMARATE DIHYDRATE 2 PUFF(S): 160; 4.5 AEROSOL RESPIRATORY (INHALATION) at 05:12

## 2018-10-05 NOTE — PROGRESS NOTE ADULT - SUBJECTIVE AND OBJECTIVE BOX
EPS NP Note:    Called to evaluate patient for possible loop recorder implant  This is a 54 y/o right handed male with PMH of HTN, pre DM, CAD, s/p Coronary Stent x1, MI 9/2009, CVA 2008 was on Coumadin, p/w left sided headache and vision loss. Neuro exam showed Right Homonymous Hemianopsia.  Review of telemetry has been Sinus Rhythm 70-80's, no Afib noted    Impression:  Cerebral Embolism with cerebral infraction, Left PCA distribution stroke- likely etiology Cryptogenic Stroke  EPS consulted for loop recorder implantation  D/w patient ILR procedure but patient refusing ILR at this time  Please recall back if patient agreeable

## 2018-10-05 NOTE — PROGRESS NOTE ADULT - ASSESSMENT
53 years old man with multiple vascular risk factors including age, HTN, pre-DM II and right thalamocapsular ICH in 2008 is evaluated at [facility] for acute onset of right-sided binocular visual loss with last known well being at 3:20 PM on 10/2. CT brain admission did not show any evidence of acute infarct or hemorrhage. After discussing the risks and benefits (especially in the setting of prior ICH), he was treated with IV tPA as benefits were thought to outweigh potential risks. CT brain/MRI brain subsequently showed left PCA distribution infarct with asymptomatic hemorrhagic transformation (PH-2). MRA head and neck was grossly unremarkable.     Impression:  Cerebral embolism with cerebral infarction. Left PCA distribution stroke with mild hemorrhagic transformation (PH-2) - likely etiology being cryptogenic stroke, probably related to embolism from a proximal source like cardiac/paradoxical source of embolism     NEURO: Continue close monitoring for neurologic deterioration, BP goal<160/90 mmHg in the setting of hemorrhagic transformation, continue with home statuin medications as per home dose if applicable, considering likely non-atheroembolic etiology of his stroke; MRI Brain w/o completed. Physical therapy/OT recommends: home no skilled needs    ANTITHROMBOTIC THERAPY: ASA for secondary stroke prevention in the setting of asymptomatic hemorrhagic transformation of ischemic stroke     PULMONARY: protecting airway, saturating well     CARDIOVASCULAR: TTE:  Left ventricular enlargement. Moderate to severe segmental left ventricular systolic dysfunction, No left ventricular thrombus. Mild diastolic dysfunction (Stage I).cardiac monitoring, plan for ICM placement for prolonged cardiac monitoring to screen for occult cardiac arrhythmias like atrial fibrillation being the cardiac source of embolism as it could potentially change the measures of secondary stroke prevention in his case, however patient refused to have loop placed even after in depth conversation of purpose of loop     SBP goal: gradual normotension    GASTROINTESTINAL:  dysphagia screen - passed, tolerating diet      Diet: Regular, DASH    RENAL: BUN/Cr stable, good urine output, hypokalemic - pending BMP from this am.      Na Goal: Greater than 135     Llamas: N    HEMATOLOGY: H/H stable, no signs of active bleeding, Platelets normal      DVT ppx: venodynes     ID: afebrile, no leukocytosis     OTHER: All question and concerns addressed. Case discussed with his PCP as per patient's request.     DISPOSITION: Home with no skilled needs pending CT scan    CORE MEASURES:        Admission NIHSS: 2     TPA: [x] YES [] NO      LDL/HDL: 96/34     Depression Screen: p     Statin Therapy: Y     Dysphagia Screen: [x] PASS [] FAIL     Smoking [] YES [x] NO      Afib [] YES [x] NO     Stroke Education [x] YES [] NO 53 years old man with multiple vascular risk factors including age, HTN, pre-DM II and right thalamocapsular ICH in 2008 is evaluated at [facility] for acute onset of right-sided binocular visual loss with last known well being at 3:20 PM on 10/2. CT brain admission did not show any evidence of acute infarct or hemorrhage. After discussing the risks and benefits (especially in the setting of prior ICH), he was treated with IV tPA as benefits were thought to outweigh potential risks. CT brain/MRI brain subsequently showed left PCA distribution infarct with asymptomatic hemorrhagic transformation (PH-2). MRA head and neck was grossly unremarkable. CT brain on 10/5 showed expected evolution of left PCA distribution infarct with asymptomatic hemorrhagic transformation. CHANDLER did not show any obvious structural cardiac source of embolism but showed moderate to severe left ventricular dysfunction with regional wall motion abnormalities but did not show any evidence of left ventricular thrombus or a PFO.    Impression:  Cerebral embolism with cerebral infarction. Left PCA distribution stroke with mild hemorrhagic transformation (PH-2) - likely etiology being cryptogenic stroke, probably related to embolism from a proximal source like cardiac/paradoxical source of embolism     NEURO: Continue close monitoring for neurologic deterioration, BP goal<160/90 mmHg in the setting of hemorrhagic transformation, continue with home statuin medications as per home dose if applicable, considering likely non-atheroembolic etiology of his stroke; MRI Brain w/o completed. Physical therapy/OT recommends: home no skilled needs    ANTITHROMBOTIC THERAPY: ASA for secondary stroke prevention in the setting of asymptomatic hemorrhagic transformation of ischemic stroke and stable clinical/radiological examination    PULMONARY: protecting airway, saturating well     CARDIOVASCULAR: TTE:  Left ventricular enlargement. Moderate to severe segmental left ventricular systolic dysfunction, No left ventricular thrombus. Mild diastolic dysfunction (Stage I).cardiac monitoring, plan for ICM placement for prolonged cardiac monitoring to screen for occult cardiac arrhythmias like atrial fibrillation being the cardiac source of embolism as it could potentially change the measures of secondary stroke prevention in his case, however patient adamantly refused to have ICM placed even after in-depth conversation of purpose of loop. Also advised to undergo further cardiac workup to evaluate for regional wall motion abnormalities but he preferred to have further cardiac workup done through his cardiologist and adamantly requested to be discharged from the hospital 	      SBP goal: gradual normotension    GASTROINTESTINAL:  dysphagia screen - passed, tolerating diet      Diet: Regular, DASH    RENAL: BUN/Cr stable, good urine output, hypokalemic - pending BMP from this am.      Na Goal: Greater than 135     Llamas: N    HEMATOLOGY: H/H stable, no signs of active bleeding, Platelets normal      DVT ppx: venodynes     ID: afebrile, no leukocytosis     OTHER: All question and concerns addressed. Case discussed with his PCP as per patient's request. Consider neuro-ophthalmologist evaluation as outpatient and also advised to avoid driving until further evaluation by neuro-ophthalmologist    DISPOSITION: Home with no skilled needs pending CT scan    CORE MEASURES:        Admission NIHSS: 2     TPA: [x] YES [] NO      LDL/HDL: 96/34     Depression Screen: p     Statin Therapy: Y     Dysphagia Screen: [x] PASS [] FAIL     Smoking [] YES [x] NO      Afib [] YES [x] NO     Stroke Education [x] YES [] NO

## 2018-10-05 NOTE — PROGRESS NOTE ADULT - SUBJECTIVE AND OBJECTIVE BOX
Subjective: Patient seen and examined. No new events except as noted.   s/p CHANDLER this am. NO PFO or LV thrombus   remains in Stroke unit     REVIEW OF SYSTEMS:    CONSTITUTIONAL: = weakness, fevers or chills  EYES/ENT: + visual changes;  No vertigo or throat pain   NECK: No pain or stiffness  RESPIRATORY: No cough, wheezing, hemoptysis; No shortness of breath  CARDIOVASCULAR: No chest pain or palpitations  GASTROINTESTINAL: No abdominal or epigastric pain. No nausea, vomiting, or hematemesis; No diarrhea or constipation. No melena or hematochezia.  GENITOURINARY: No dysuria, frequency or hematuria  NEUROLOGICAL: = numbness or weakness  SKIN: No itching, burning, rashes, or lesions   All other review of systems is negative unless indicated above.    MEDICATIONS:  MEDICATIONS  (STANDING):  aspirin  chewable 81 milliGRAM(s) Oral daily  atorvastatin 80 milliGRAM(s) Oral at bedtime  buDESOnide 160 MICROgram(s)/formoterol 4.5 MICROgram(s) Inhaler 2 Puff(s) Inhalation two times a day  dextrose 5%. 1000 milliLiter(s) (50 mL/Hr) IV Continuous <Continuous>  dextrose 50% Injectable 12.5 Gram(s) IV Push once  dextrose 50% Injectable 25 Gram(s) IV Push once  dextrose 50% Injectable 25 Gram(s) IV Push once  hydrochlorothiazide 12.5 milliGRAM(s) Oral daily  insulin lispro (HumaLOG) corrective regimen sliding scale   SubCutaneous three times a day before meals  losartan 25 milliGRAM(s) Oral daily  montelukast 10 milliGRAM(s) Oral daily      PHYSICAL EXAM:  T(C): 37.3 (10-04-18 @ 20:34), Max: 37.5 (10-04-18 @ 16:00)  HR: 80 (10-05-18 @ 10:39) (56 - 86)  BP: 150/99 (10-05-18 @ 10:39) (150/99 - 176/99)  RR: 18 (10-05-18 @ 10:39) (12 - 19)  SpO2: 98% (10-05-18 @ 10:39) (97% - 99%)  Wt(kg): --  I&O's Summary        Appearance: Normal	  HEENT:   Normal oral mucosa, PERRL, EOMI	  Lymphatic: No lymphadenopathy , no edema  Cardiovascular: Normal S1 S2, No JVD, No murmurs , Peripheral pulses palpable 2+ bilaterally  Respiratory: Lungs clear to auscultation, normal effort 	  Gastrointestinal:  Soft, Non-tender, + BS	  Skin: No rashes, No ecchymoses, No cyanosis, warm to touch  Musculoskeletal: Normal range of motion, normal strength  Psychiatry:  Mood & affect appropriate  Ext: No edema  NEUROLOGICAL EXAM:  Mental status: Awake, alert, oriented x3, no aphasia, no neglect, normal memory   Cranial Nerves: R Superior quadrantanopsia, No facial asymmetry, no nystagmus, no dysarthria,  tongue midline  Motor exam: Normal tone, no drift, 5/5 RUE, 5/5 RLE, 5/5 LUE, 5/5 LLE, normal fine finger movements.  Sensation: Intact to light touch   Coordination/ Gait: No dysmetria, ISSAC intact and symmetric bilaterally    LABS:    CARDIAC MARKERS:                                14.7   9.4   )-----------( 208      ( 05 Oct 2018 06:22 )             43.3     10-05    137  |  104  |  18  ----------------------------<  121<H>  3.5   |  23  |  0.99    Ca    9.2      05 Oct 2018 06:21      proBNP:   Lipid Profile:   HgA1c:   TSH:             TELEMETRY: SR	    ECG:  	  RADIOLOGY:   DIAGNOSTIC TESTING:      [ ] Echocardiogram:            < from: TTE with Doppler (w/Cont) (10.04.18 @ 07:00) >    Patient name: LD VALENCIA  YOB: 1965   Age: 53 (M)   MR#: [id number]  Study Date: 10/4/2018  [facility]her: Malachi Caballero RDCS  Study quality: Technically fair  Referring Physician: Berry Ramos MD  Blood Pressure: 152/102 mmHg  Height: 172 cm  Weight: 89 kg  BSA: 2 m2  ------------------------------------------------------------------------  PROCEDURE: Transthoracic echocardiogram with 2-D, M-Mode  and complete spectral and color flow Doppler. Patient was  injected with 10 cc's of aerosolized saline. Patient was  injected with 10 cc's of aerosolized saline. Verbal consent  was obtained for injection of  Ultrasonic Enhancing Agent  following a discussion of risks and benefits. Following  intravenous injection of Ultrasonic Enhancing Agent ,  harmonic imaging was performed.  INDICATION: Other cerebrovascular disease (I67.89)  ------------------------------------------------------------------------  Dimensions:    Normal Values:  LA:     4.0    2.0 - 4.0 cm  Ao:     3.2    2.0 - 3.8 cm  SEPTUM:        0.6 - 1.2 cm  PWT:           0.6 - 1.1 cm  LVIDd:         3.0 - 5.6 cm  LVIDs:         1.8 - 4.0 cm  EF (Chinchilla Rule): 37 %Doppler Peak Velocity (m/sec):  AoV=1.2  ------------------------------------------------------------------------  Observations:  Mitral Valve: Normal mitral valve.  Aortic Valve/Aorta: Calcified trileaflet aortic valve with  normal opening. Peak transaortic valve gradient equals 6 mm  Hg, mean transaortic valve gradient equals 3 mm Hg, aortic  valve velocity time integral equals 26 cm. Peak left  ventricular outflow tract gradient equals 3 mm Hg, mean  gradient is equal to 2 mm Hg, LVOT velocity time integral  equals 17 cm.  Aortic Root: 3.2 cm.  Left Atrium: Normal left atrium.  LA volume index = 27  cc/m2.  Left Ventricle: Moderate to severe segmental left  ventricular systolic dysfunction.  Akinesis of the mid to  distal septum and  anterior wall.  Dyskiinetic apex.  Distal inferior and inferolateal wall. Endocardial  visualization enhanced with intravenous injection of  Ultrasonic Enhancing Agent (Definity). No left ventricular  thrombus. Left ventricular enlargement. Mild diastolic  dysfunction (Stage I).  Right Heart: Normal right atrium. Normal right ventricular  size and function. Normal tricuspid valve. Minimal  tricuspid regurgitation. Normal pulmonic valve.  Pericardium/Pleura: Normal pericardium with no pericardial  effusion.  Hemodynamic: Estimated right atrial pressure is 8 mm Hg.  Estimated right ventricular systolic pressure equals 12 mm  Hg, assuming right atrial pressure equals 8 mm Hg,  consistent with normal pulmonary pressures.  ------------------------------------------------------------------------  Conclusions:  1. Left ventricular enlargement.  2. Moderate to severe segmental left ventricular systolic  dysfunction.  Akinesis of the mid to distal septum and  anterior wall.  Dyskiinetic apex.  Distal inferior and  inferolateal wall. Endocardial visualization enhanced with  intravenous injection of Ultrasonic Enhancing Agent  (Definity). No left ventricular thrombus.  3. Mild diastolic dysfunction (Stage I).  *** Compared with echocardiogram of 1/25/2017, no  significant changes noted.  ------------------------------------------------------------------------  Confirmed on  10/4/2018 - 11:23:33 by Pietro Parrish M.D.  ------------------------------------------------------------------------    < end of copied text >    [ ]  Catheterization:  [ ] Stress Test:    OTHER:

## 2018-10-05 NOTE — OCCUPATIONAL THERAPY INITIAL EVALUATION ADULT - ADDITIONAL COMMENTS
MRA Head 10/3 Evidence of an acute left PCA territory infarct with associated hemorrhage and mass effect on the atria and occipital horn of the left lateral ventricle. Intraventricular extension of the hemorrhage is suggested with a component of hemorrhage in the right occipital horn of the lateral ventricle appreciated. Old right cerebellar infarct

## 2018-10-05 NOTE — PROGRESS NOTE ADULT - PROBLEM SELECTOR PLAN 3
Uncontrolled   RISS   Endocrine consulted
Uncontrolled   RISS   Endocrine consult.
Uncontrolled   RISS   Endocrine consult.

## 2018-10-05 NOTE — PROVIDER CONTACT NOTE (OTHER) - SITUATION
Patient refusing to be NPO and bedrest after echocardiogram,patient refusing  cardiac monitoring and Blood pressure reading at this time

## 2018-10-05 NOTE — PROGRESS NOTE ADULT - PROBLEM SELECTOR PLAN 1
Reviewed MRI   suggestive of embolic etiology as per Stroke attending   CHANDLER negative  ASA and statin.  neurology recommends ILR

## 2018-10-05 NOTE — PROVIDER CONTACT NOTE (OTHER) - ACTION/TREATMENT ORDERED:
Perform dysphagia screen, continue to monitor
MD Deleon came to bedside and educated pt about NPO status until 12hours s/p tPA. bleeding risk
MRI result as per MD is not read by radiologist yet, pending results
as per MD give Losartan po and reassess

## 2018-10-05 NOTE — CONSULT NOTE ADULT - SUBJECTIVE AND OBJECTIVE BOX
CHIEF COMPLAINT:  Headache and vision loss    HISTORY OF PRESENT ILLNESS:  Patient is a 53 year old right handed Malaysian [place]ican male presenting with headache and vision loss. Patient has PMH of HTN, preDM, R thalamocapsular hemorrhage 2008 with no residual symptoms, and MI in September, 2009. Last known normal was 3:20pm. Around 3:30pm, he was taking something out of the car when he noted a left sided headache associated with vision loss on the right side of his vision. Denies any numbness, tingling, nausea, vomiting, weakness.      Allergies    contrast dye (Short breath)  No Known Drug Allergies    Intolerances    	    MEDICATIONS:  aspirin  chewable 81 milliGRAM(s) Oral daily  hydrochlorothiazide 12.5 milliGRAM(s) Oral daily  losartan 25 milliGRAM(s) Oral daily  buDESOnide 160 MICROgram(s)/formoterol 4.5 MICROgram(s) Inhaler 2 Puff(s) Inhalation two times a day  montelukast 10 milliGRAM(s) Oral daily  acetaminophen   Tablet .. 650 milliGRAM(s) Oral every 6 hours PRN  atorvastatin 80 milliGRAM(s) Oral at bedtime  dextrose 40% Gel 15 Gram(s) Oral once PRN  dextrose 50% Injectable 12.5 Gram(s) IV Push once  dextrose 50% Injectable 25 Gram(s) IV Push once  dextrose 50% Injectable 25 Gram(s) IV Push once  glucagon  Injectable 1 milliGRAM(s) IntraMuscular once PRN  insulin lispro (HumaLOG) corrective regimen sliding scale   SubCutaneous three times a day before meals  dextrose 5%. 1000 milliLiter(s) IV Continuous <Continuous>      PAST MEDICAL & SURGICAL HISTORY:  Myocardial infarction, unspecified MI type, unspecified artery  Respiratory arrest: december 1st  Intracranial hemorrhage: 2008  CAD (coronary artery disease): 2009; stent  HTN (hypertension)  History of coronary artery stent placement      FAMILY HISTORY:  Family history of hypertension in mother: Mother  Family history of meningitis (Sibling): Brother, death at age 49 from complications of infection (June 2015)      SOCIAL HISTORY:    [ ] Non-smoker  [ ] Smoker  [ ] Alcohol      REVIEW OF SYSTEMS:  See HPI. Otherwise, 10 point ROS done and otherwise negative.    PHYSICAL EXAM:  T(C): 37.3 (10-04-18 @ 20:34), Max: 37.5 (10-04-18 @ 16:00)  HR: 81 (10-05-18 @ 12:03) (56 - 86)  BP: 147/93 (10-05-18 @ 12:03) (147/93 - 176/99)  RR: 18 (10-05-18 @ 10:39) (12 - 19)  SpO2: 98% (10-05-18 @ 10:39) (97% - 99%)  Wt(kg): --  I&O's Summary      Appearance: Normal	  HEENT:   Normal oral mucosa, PERRL, EOMI	  Lymphatic: No lymphadenopathy  Cardiovascular: Normal S1 S2, No JVD, No murmurs, No edema  Respiratory: Lungs clear to auscultation	  Psychiatry: A & O x 3, Mood & affect appropriate  Gastrointestinal:  Soft, Non-tender, + BS	  Skin: No rashes, No ecchymoses, No cyanosis	  Neurologic: Non-focal  Extremities: Normal range of motion, No clubbing, cyanosis or edema  Vascular: Peripheral pulses palpable 2+ bilaterally        LABS:	 	    CBC Full  -  ( 05 Oct 2018 06:22 )  WBC Count : 9.4 K/uL  Hemoglobin : 14.7 g/dL  Hematocrit : 43.3 %  Platelet Count - Automated : 208 K/uL  Mean Cell Volume : 83.5 fl  Mean Cell Hemoglobin : 28.4 pg  Mean Cell Hemoglobin Concentration : 34.1 gm/dL  Auto Neutrophil # : x  Auto Lymphocyte # : x  Auto Monocyte # : x  Auto Eosinophil # : x  Auto Basophil # : x  Auto Neutrophil % : x  Auto Lymphocyte % : x  Auto Monocyte % : x  Auto Eosinophil % : x  Auto Basophil % : x    10-05    137  |  104  |  18  ----------------------------<  121<H>  3.5   |  23  |  0.99  10-04    136  |  103  |  24<H>  ----------------------------<  150<H>  3.7   |  24  |  1.18    Ca    9.2      05 Oct 2018 06:21  Ca    8.8      04 Oct 2018 06:57      TELEMETRY: FLORENCIA 70-90's       Study Date: 10/4/2018    PROCEDURE: Transthoracic echocardiogram with 2-D, M-Mode  and complete spectral and color flow Doppler. Patient was  injected with 10 cc's of aerosolized saline. Patient was  injected with 10 cc's of aerosolized saline. Verbal consent  was obtained for injection of  Ultrasonic Enhancing Agent  following a discussion of risks and benefits. Following  intravenous injection of Ultrasonic Enhancing Agent ,  harmonic imaging was performed.  INDICATION: Other cerebrovascular disease (I67.89)  ------------------------------------------------------------------------  Dimensions:    Normal Values:  LA:     4.0    2.0 - 4.0 cm  Ao:     3.2    2.0 - 3.8 cm  SEPTUM:        0.6 - 1.2 cm  PWT:           0.6 - 1.1 cm  LVIDd:         3.0 - 5.6 cm  LVIDs:         1.8 - 4.0 cm  EF (Chinchilla Rule): 37 %Doppler Peak Velocity (m/sec):  AoV=1.2  ------------------------------------------------------------------------  Observations:  Mitral Valve: Normal mitral valve.  Aortic Valve/Aorta: Calcified trileaflet aortic valve with  normal opening. Peak transaortic valve gradient equals 6 mm  Hg, mean transaortic valve gradient equals 3 mm Hg, aortic  valve velocity time integral equals 26 cm. Peak left  ventricular outflow tract gradient equals 3 mm Hg, mean  gradient is equal to 2 mm Hg, LVOT velocity time integral  equals 17 cm.  Aortic Root: 3.2 cm.  Left Atrium: Normal left atrium.  LA volume index = 27  cc/m2.  Left Ventricle: Moderate to severe segmental left  ventricular systolic dysfunction.  Akinesis of the mid to  distal septum and  anterior wall.  Dyskinetic apex.  Distal inferior and inferolateral wall. Endocardial  visualization enhanced with intravenous injection of  Ultrasonic Enhancing Agent (Definity). No left ventricular  thrombus. Left ventricular enlargement. Mild diastolic  dysfunction (Stage I).  Right Heart: Normal right atrium. Normal right ventricular  size and function. Normal tricuspid valve. Minimal  tricuspid regurgitation. Normal pulmonic valve.  Pericardium/Pleura: Normal pericardium with no pericardial  effusion.  Hemodynamic: Estimated right atrial pressure is 8 mm Hg.  Estimated right ventricular systolic pressure equals 12 mm  Hg, assuming right atrial pressure equals 8 mm Hg,  consistent with normal pulmonary pressures.  ------------------------------------------------------------------------  Conclusions:  1. Left ventricular enlargement.  2. Moderate to severe segmental left ventricular systolic  dysfunction.  Akinesis of the mid to distal septum and  anterior wall.  Dyskiinetic apex.  Distal inferior and  inferolateral wall. Endocardial visualization enhanced with  intravenous injection of Ultrasonic Enhancing Agent  (Definity). No left ventricular thrombus.  3. Mild diastolic dysfunction (Stage I).  *** Compared with echocardiogram of 1/25/2017, no  significant changes noted.  ------------------------------------------------------------------------  Confirmed on  10/4/2018 - 11:23:33 by Pietro Parrish M.D.  ------------------------------------------------------------------------

## 2018-10-05 NOTE — PROGRESS NOTE ADULT - NSHPATTENDINGPLANDISCUSS_GEN_ALL_CORE
Neurology residents, NP and medical students on stroke service
Neurology resident, NP and medical students on stroke service

## 2018-10-05 NOTE — PROGRESS NOTE ADULT - SUBJECTIVE AND OBJECTIVE BOX
THE PATIENT WAS SEEN AND EXAMINED BY ME WITH THE HOUSESTAFF AND STROKE TEAM DURING MORNING ROUNDS.   HPI:  53 year old right handed Ivorian Frisian male presented with headache and vision loss. 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 Last known normal was 3:20pm on 10/2. Around 3:30pm, he was taking something out of the car when he noted a left sided headache associated with vision loss on the right side of his vision. Denies any numbness, tingling, nausea, vomiting, weakness.  Neuro exam showed right homonymous hemianopsiaCTH negative for acute pathology  NIHSS 2 preMRS 0tPA bolus give 10/2/18 at 18:33    SUBJECTIVE: No events overnight.  No new neurologic complaints.      acetaminophen   Tablet .. 650 milliGRAM(s) Oral every 6 hours PRN  aspirin  chewable 81 milliGRAM(s) Oral daily  atorvastatin 80 milliGRAM(s) Oral at bedtime  buDESOnide 160 MICROgram(s)/formoterol 4.5 MICROgram(s) Inhaler 2 Puff(s) Inhalation two times a day  dextrose 40% Gel 15 Gram(s) Oral once PRN  dextrose 5%. 1000 milliLiter(s) IV Continuous <Continuous>  dextrose 50% Injectable 12.5 Gram(s) IV Push once  dextrose 50% Injectable 25 Gram(s) IV Push once  dextrose 50% Injectable 25 Gram(s) IV Push once  glucagon  Injectable 1 milliGRAM(s) IntraMuscular once PRN  hydrochlorothiazide 12.5 milliGRAM(s) Oral daily  insulin lispro (HumaLOG) corrective regimen sliding scale   SubCutaneous three times a day before meals  losartan 25 milliGRAM(s) Oral daily  montelukast 10 milliGRAM(s) Oral daily      PHYSICAL EXAM:   Vital Signs Last 24 Hrs  T(C): 36.5 (05 Oct 2018 16:09), Max: 37.3 (04 Oct 2018 20:34)  T(F): 97.7 (05 Oct 2018 16:09), Max: 99.1 (04 Oct 2018 20:34)  HR: 81 (05 Oct 2018 14:00) (56 - 86)  BP: 149/89 (05 Oct 2018 14:00) (147/93 - 176/99)  BP(mean): 129 (05 Oct 2018 05:00) (120 - 129)  RR: 18 (05 Oct 2018 14:00) (12 - 24)  SpO2: 98% (05 Oct 2018 14:00) (97% - 99%)    General: No acute distress  HEENT: RHH, EOMI  Abdomen: Soft, nontender, nondistended   Extremities: No edema    NEUROLOGICAL EXAM:  Mental status: Awake, alert, oriented x3, no aphasia, no neglect, normal memory   Cranial Nerves: R Superior quadrantanopsia, No facial asymmetry, no nystagmus, no dysarthria,  tongue midline  Motor exam: Normal tone, no drift, 5/5 RUE, 5/5 RLE, 5/5 LUE, 5/5 LLE, normal fine finger movements.  Sensation: Intact to light touch   Coordination/ Gait: No dysmetria, ISSAC intact and symmetric bilaterally    LABS:                        14.7   9.4   )-----------( 208      ( 05 Oct 2018 06:22 )             43.3    10-05    137  |  104  |  18  ----------------------------<  121<H>  3.5   |  23  |  0.99    Ca    9.2      05 Oct 2018 06:21      Hemoglobin A1C, Whole Blood: 8.1 % (10-03 @ 07:43)      IMAGING: Reviewed by me.     CT BRAIN (10/2/18): No intracranial hemorrhage, mass effect, or midline shift. THE PATIENT WAS SEEN AND EXAMINED BY ME WITH THE HOUSESTAFF AND STROKE TEAM DURING MORNING ROUNDS.     HPI:  53 year old right handed [place] male presented with headache and vision loss. 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 Last known normal was 3:20pm on 10/2. Around 3:30pm, he was taking something out of the car when he noted a left sided headache associated with vision loss on the right side of his vision. Denies any numbness, tingling, nausea, vomiting, weakness.  Neuro exam showed right homonymous hemianopsiaCTH negative for acute pathology  NIHSS 2 preMRS 0tPA bolus give 10/2/18 at 18:33    SUBJECTIVE: No events overnight.  No new neurologic complaints.      ROS: All negative except documented above.    acetaminophen   Tablet .. 650 milliGRAM(s) Oral every 6 hours PRN  aspirin  chewable 81 milliGRAM(s) Oral daily  atorvastatin 80 milliGRAM(s) Oral at bedtime  buDESOnide 160 MICROgram(s)/formoterol 4.5 MICROgram(s) Inhaler 2 Puff(s) Inhalation two times a day  dextrose 40% Gel 15 Gram(s) Oral once PRN  dextrose 5%. 1000 milliLiter(s) IV Continuous <Continuous>  dextrose 50% Injectable 12.5 Gram(s) IV Push once  dextrose 50% Injectable 25 Gram(s) IV Push once  dextrose 50% Injectable 25 Gram(s) IV Push once  glucagon  Injectable 1 milliGRAM(s) IntraMuscular once PRN  hydrochlorothiazide 12.5 milliGRAM(s) Oral daily  insulin lispro (HumaLOG) corrective regimen sliding scale   SubCutaneous three times a day before meals  losartan 25 milliGRAM(s) Oral daily  montelukast 10 milliGRAM(s) Oral daily      PHYSICAL EXAM:   Vital Signs Last 24 Hrs  T(C): 36.5 (05 Oct 2018 16:09), Max: 37.3 (04 Oct 2018 20:34)  T(F): 97.7 (05 Oct 2018 16:09), Max: 99.1 (04 Oct 2018 20:34)  HR: 81 (05 Oct 2018 14:00) (56 - 86)  BP: 149/89 (05 Oct 2018 14:00) (147/93 - 176/99)  BP(mean): 129 (05 Oct 2018 05:00) (120 - 129)  RR: 18 (05 Oct 2018 14:00) (12 - 24)  SpO2: 98% (05 Oct 2018 14:00) (97% - 99%)    General: No acute distress  HEENT: R superior quadrantanopsia, EOMI  Abdomen: Soft, nontender, nondistended   Extremities: No edema    NEUROLOGICAL EXAM:  Mental status: Awake, alert, oriented x3, no aphasia, no neglect, normal memory   Cranial Nerves: R Superior quadrantanopsia, No facial asymmetry, no nystagmus, no dysarthria,  tongue midline  Motor exam: Normal tone, no drift, 5/5 RUE, 5/5 RLE, 5/5 LUE, 5/5 LLE, normal fine finger movements.  Sensation: Intact to light touch   Coordination/ Gait: No dysmetria, ISSAC intact and symmetric bilaterally    LABS:                        14.7   9.4   )-----------( 208      ( 05 Oct 2018 06:22 )             43.3    10-05    137  |  104  |  18  ----------------------------<  121<H>  3.5   |  23  |  0.99    Ca    9.2      05 Oct 2018 06:21      Hemoglobin A1C, Whole Blood: 8.1 % (10-03 @ 07:43)      IMAGING: Reviewed by me.     CT BRAIN (10/2/18): No intracranial hemorrhage, mass effect, or midline shift.

## 2018-10-05 NOTE — PROVIDER CONTACT NOTE (OTHER) - REASON
Patient refusing to be NPO and bedrest after echocardiogram, patient refusing  cardiac monitoring and Blood pressure reading at this time

## 2018-10-05 NOTE — OCCUPATIONAL THERAPY INITIAL EVALUATION ADULT - PERTINENT HX OF CURRENT PROBLEM, REHAB EVAL
Patient is a 53 year old right handed Czech [place]ian male presenting with headache and vision loss. Patient has PMH of HTN, preDM, R thalamocapsular hemorrhage 2008 with no residual symptoms, and MI in September, 2009. Last known normal was 3:20pm. Around 3:30pm, he was taking something out of the car when he noted a left sided headache associated with vision loss on the right side of his vision. Denies any numbness, tingling, nausea, vomiting, weakness.

## 2018-10-22 ENCOUNTER — RESULT CHARGE (OUTPATIENT)
Age: 53
End: 2018-10-22

## 2018-10-23 ENCOUNTER — APPOINTMENT (OUTPATIENT)
Dept: PULMONOLOGY | Facility: CLINIC | Age: 53
End: 2018-10-23
Payer: COMMERCIAL

## 2018-10-23 VITALS
DIASTOLIC BLOOD PRESSURE: 81 MMHG | HEART RATE: 64 BPM | TEMPERATURE: 97.8 F | HEIGHT: 69 IN | SYSTOLIC BLOOD PRESSURE: 121 MMHG | WEIGHT: 193 LBS | OXYGEN SATURATION: 97 % | RESPIRATION RATE: 16 BRPM | BODY MASS INDEX: 28.58 KG/M2

## 2018-10-23 PROBLEM — I21.9 ACUTE MYOCARDIAL INFARCTION, UNSPECIFIED: Chronic | Status: ACTIVE | Noted: 2018-10-02

## 2018-10-23 PROCEDURE — 99213 OFFICE O/P EST LOW 20 MIN: CPT | Mod: 25

## 2018-10-23 PROCEDURE — 96401 CHEMO ANTI-NEOPL SQ/IM: CPT

## 2018-10-23 RX ORDER — MEPOLIZUMAB 100 MG/ML
100 INJECTION, POWDER, FOR SOLUTION SUBCUTANEOUS
Qty: 0 | Refills: 0 | Status: COMPLETED | OUTPATIENT
Start: 2018-10-23

## 2018-10-23 RX ADMIN — MEPOLIZUMAB 0.67 MG: 100 INJECTION, POWDER, FOR SOLUTION SUBCUTANEOUS at 00:00

## 2018-11-13 ENCOUNTER — APPOINTMENT (OUTPATIENT)
Dept: PULMONOLOGY | Facility: CLINIC | Age: 53
End: 2018-11-13

## 2018-11-13 VITALS
OXYGEN SATURATION: 97 % | TEMPERATURE: 98.7 F | HEART RATE: 86 BPM | DIASTOLIC BLOOD PRESSURE: 84 MMHG | SYSTOLIC BLOOD PRESSURE: 129 MMHG

## 2018-11-17 ENCOUNTER — RECORD ABSTRACTING (OUTPATIENT)
Age: 53
End: 2018-11-17

## 2018-11-20 ENCOUNTER — APPOINTMENT (OUTPATIENT)
Dept: PULMONOLOGY | Facility: CLINIC | Age: 53
End: 2018-11-20

## 2019-01-04 ENCOUNTER — APPOINTMENT (OUTPATIENT)
Dept: PULMONOLOGY | Facility: CLINIC | Age: 54
End: 2019-01-04

## 2019-01-20 ENCOUNTER — RESULT CHARGE (OUTPATIENT)
Age: 54
End: 2019-01-20

## 2019-01-21 ENCOUNTER — APPOINTMENT (OUTPATIENT)
Dept: PULMONOLOGY | Facility: CLINIC | Age: 54
End: 2019-01-21
Payer: COMMERCIAL

## 2019-01-21 VITALS
HEART RATE: 74 BPM | TEMPERATURE: 98.4 F | SYSTOLIC BLOOD PRESSURE: 120 MMHG | DIASTOLIC BLOOD PRESSURE: 72 MMHG | OXYGEN SATURATION: 100 %

## 2019-01-21 PROCEDURE — 99213 OFFICE O/P EST LOW 20 MIN: CPT | Mod: 25

## 2019-01-21 PROCEDURE — 36415 COLL VENOUS BLD VENIPUNCTURE: CPT

## 2019-01-21 PROCEDURE — 96401 CHEMO ANTI-NEOPL SQ/IM: CPT

## 2019-01-21 PROCEDURE — 94060 EVALUATION OF WHEEZING: CPT

## 2019-01-21 RX ORDER — MEPOLIZUMAB 100 MG/ML
100 INJECTION, POWDER, FOR SOLUTION SUBCUTANEOUS
Qty: 0 | Refills: 0 | Status: COMPLETED | OUTPATIENT
Start: 2019-01-21

## 2019-01-21 RX ADMIN — MEPOLIZUMAB 1 MG: 100 INJECTION, POWDER, FOR SOLUTION SUBCUTANEOUS at 00:00

## 2019-01-21 NOTE — PHYSICAL EXAM
[General Appearance - Well Developed] : well developed [General Appearance - Well Nourished] : well nourished [Normal Conjunctiva] : the conjunctiva exhibited no abnormalities [Normal Oropharynx] : normal oropharynx [Heart Rate And Rhythm] : heart rate and rhythm were normal [Heart Sounds] : normal S1 and S2 [] : no respiratory distress [Respiration, Rhythm And Depth] : normal respiratory rhythm and effort [Exaggerated Use Of Accessory Muscles For Inspiration] : no accessory muscle use [Auscultation Breath Sounds / Voice Sounds] : lungs were clear to auscultation bilaterally [Abdomen Soft] : soft [Abdomen Tenderness] : non-tender [Abnormal Walk] : normal gait [Oriented To Time, Place, And Person] : oriented to person, place, and time [Impaired Insight] : insight and judgment were intact

## 2019-02-04 LAB
BASOPHILS # BLD AUTO: 0.01 K/UL
BASOPHILS NFR BLD AUTO: 0.1 %
EOSINOPHIL # BLD AUTO: 0.11 K/UL
EOSINOPHIL NFR BLD AUTO: 1.2 %
HCT VFR BLD CALC: 46 %
HGB BLD-MCNC: 14.9 G/DL
IMM GRANULOCYTES NFR BLD AUTO: 0.2 %
LYMPHOCYTES # BLD AUTO: 2.53 K/UL
LYMPHOCYTES NFR BLD AUTO: 27 %
MAN DIFF?: NORMAL
MCHC RBC-ENTMCNC: 27.7 PG
MCHC RBC-ENTMCNC: 32.4 GM/DL
MCV RBC AUTO: 85.7 FL
MONOCYTES # BLD AUTO: 0.35 K/UL
MONOCYTES NFR BLD AUTO: 3.7 %
NEUTROPHILS # BLD AUTO: 6.36 K/UL
NEUTROPHILS NFR BLD AUTO: 67.8 %
PLATELET # BLD AUTO: 242 K/UL
RBC # BLD: 5.37 M/UL
RBC # FLD: 13.9 %
TOTAL IGE SMQN RAST: 638 KU/L
WBC # FLD AUTO: 9.38 K/UL

## 2019-02-07 NOTE — PROCEDURE
[FreeTextEntry1] : romel in office- mod mixed obstructive/restrictive pattern\par \par venipuncture in office

## 2019-02-07 NOTE — DISCUSSION/SUMMARY
[FreeTextEntry1] : severe persistent asthma. \par Patient is improved with use of medications. \par Continue with current dosing of Nucala. \par He does need to make efforts to lose weight.

## 2019-02-19 ENCOUNTER — APPOINTMENT (OUTPATIENT)
Dept: PULMONOLOGY | Facility: CLINIC | Age: 54
End: 2019-02-19
Payer: COMMERCIAL

## 2019-02-19 VITALS
HEART RATE: 85 BPM | TEMPERATURE: 98.6 F | DIASTOLIC BLOOD PRESSURE: 74 MMHG | RESPIRATION RATE: 12 BRPM | SYSTOLIC BLOOD PRESSURE: 112 MMHG | OXYGEN SATURATION: 96 %

## 2019-02-19 PROCEDURE — 94060 EVALUATION OF WHEEZING: CPT

## 2019-02-19 PROCEDURE — 96401 CHEMO ANTI-NEOPL SQ/IM: CPT | Mod: 59

## 2019-02-19 PROCEDURE — 99213 OFFICE O/P EST LOW 20 MIN: CPT | Mod: 25

## 2019-02-19 RX ORDER — MEPOLIZUMAB 100 MG/ML
100 INJECTION, POWDER, FOR SOLUTION SUBCUTANEOUS
Qty: 0 | Refills: 0 | Status: COMPLETED | OUTPATIENT
Start: 2019-02-19

## 2019-02-19 RX ADMIN — MEPOLIZUMAB 0 MG: 100 INJECTION, POWDER, FOR SOLUTION SUBCUTANEOUS at 00:00

## 2019-02-20 NOTE — HISTORY OF PRESENT ILLNESS
[FreeTextEntry1] : Here for Annabelle, Not fully compliant with inhalers-ran out of symbicort and using Qvar 40\par Still with some SOB\par wife here with patient-says he is snoring very loud. She reports EDS which he attributes to wife's snoring

## 2019-02-20 NOTE — PHYSICAL EXAM
[General Appearance - Well Developed] : well developed [General Appearance - Well Nourished] : well nourished [Normal Oropharynx] : normal oropharynx [Heart Rate And Rhythm] : heart rate and rhythm were normal [Heart Sounds] : normal S1 and S2 [FreeTextEntry1] : minimal expiratory wheeze [Skin Color & Pigmentation] : normal skin color and pigmentation [Oriented To Time, Place, And Person] : oriented to person, place, and time [Impaired Insight] : insight and judgment were intact

## 2019-02-20 NOTE — ASSESSMENT
[FreeTextEntry1] : Severe persistent asthma non-compliant with inhalers. Will restart symbicort-gave patient coupon.\par  \par Hopefully can maximize lung function and then begin to taper inhalers.\par \par Strongly encourage pursuing home sleep study

## 2019-04-18 ENCOUNTER — APPOINTMENT (OUTPATIENT)
Dept: PULMONOLOGY | Facility: CLINIC | Age: 54
End: 2019-04-18
Payer: COMMERCIAL

## 2019-04-18 DIAGNOSIS — Z91.19 PATIENT'S NONCOMPLIANCE WITH OTHER MEDICAL TREATMENT AND REGIMEN: ICD-10-CM

## 2019-04-18 DIAGNOSIS — Z91.14 PATIENT'S OTHER NONCOMPLIANCE WITH MEDICATION REGIMEN: ICD-10-CM

## 2019-04-18 PROCEDURE — 94010 BREATHING CAPACITY TEST: CPT

## 2019-04-18 PROCEDURE — 96401 CHEMO ANTI-NEOPL SQ/IM: CPT

## 2019-04-18 PROCEDURE — 99213 OFFICE O/P EST LOW 20 MIN: CPT | Mod: 25

## 2019-04-18 RX ORDER — MEPOLIZUMAB 100 MG/ML
100 INJECTION, POWDER, FOR SOLUTION SUBCUTANEOUS
Qty: 0 | Refills: 0 | Status: COMPLETED | OUTPATIENT
Start: 2019-04-18

## 2019-04-18 RX ADMIN — MEPOLIZUMAB 0 MG: 100 INJECTION, POWDER, FOR SOLUTION SUBCUTANEOUS at 00:00

## 2019-04-19 PROBLEM — Z91.14 NONCOMPLIANCE WITH MEDICATIONS: Status: ACTIVE | Noted: 2019-04-19

## 2019-04-19 PROBLEM — Z91.19 NONCOMPLIANCE WITH TREATMENT: Status: ACTIVE | Noted: 2019-04-19

## 2019-04-19 NOTE — ASSESSMENT
[FreeTextEntry1] : asthma doing better\par compliance an issue, especially as patient has very severe asthma with life-threatening exacerbations

## 2019-04-19 NOTE — HISTORY OF PRESENT ILLNESS
[FreeTextEntry1] : here for nucala injection\par had missed last appt-i had to call PCP and insist patient come in\par \par Unclear how compliant he is with inhalers\par \par not sob

## 2019-04-19 NOTE — PHYSICAL EXAM
[General Appearance - Well Nourished] : well nourished [General Appearance - Well Developed] : well developed [Normal Oropharynx] : normal oropharynx [Heart Rate And Rhythm] : heart rate and rhythm were normal [FreeTextEntry1] : minimal expiratory wheeze [Heart Sounds] : normal S1 and S2 [Abdomen Tenderness] : non-tender [Abdomen Soft] : soft [Gait - Sufficient For Exercise Testing] : the gait was sufficient for exercise testing [Abdomen Mass (___ Cm)] : no abdominal mass palpated [Abnormal Walk] : normal gait [Nail Clubbing] : no clubbing of the fingernails [Cyanosis, Localized] : no localized cyanosis [Petechial Hemorrhages (___cm)] : no petechial hemorrhages [Skin Color & Pigmentation] : normal skin color and pigmentation [] : no rash [No Venous Stasis] : no venous stasis [Skin Lesions] : no skin lesions [No Xanthoma] : no  xanthoma was observed [No Skin Ulcers] : no skin ulcer [Deep Tendon Reflexes (DTR)] : deep tendon reflexes were 2+ and symmetric [Sensation] : the sensory exam was normal to light touch and pinprick [No Focal Deficits] : no focal deficits [Oriented To Time, Place, And Person] : oriented to person, place, and time [Impaired Insight] : insight and judgment were intact [Affect] : the affect was normal

## 2019-05-23 ENCOUNTER — RESULT CHARGE (OUTPATIENT)
Age: 54
End: 2019-05-23

## 2019-05-24 ENCOUNTER — APPOINTMENT (OUTPATIENT)
Dept: PULMONOLOGY | Facility: CLINIC | Age: 54
End: 2019-05-24
Payer: COMMERCIAL

## 2019-05-24 VITALS
TEMPERATURE: 98.3 F | SYSTOLIC BLOOD PRESSURE: 161 MMHG | OXYGEN SATURATION: 100 % | DIASTOLIC BLOOD PRESSURE: 84 MMHG | HEART RATE: 70 BPM

## 2019-05-24 PROCEDURE — 96401 CHEMO ANTI-NEOPL SQ/IM: CPT | Mod: 59

## 2019-05-24 PROCEDURE — 94060 EVALUATION OF WHEEZING: CPT

## 2019-05-24 PROCEDURE — 99213 OFFICE O/P EST LOW 20 MIN: CPT | Mod: 25

## 2019-05-24 RX ORDER — MEPOLIZUMAB 100 MG/ML
100 INJECTION, POWDER, FOR SOLUTION SUBCUTANEOUS
Qty: 0 | Refills: 0 | Status: COMPLETED | OUTPATIENT
Start: 2019-05-24

## 2019-05-24 RX ADMIN — MEPOLIZUMAB 0 MG: 100 INJECTION, POWDER, FOR SOLUTION SUBCUTANEOUS at 00:00

## 2019-05-26 NOTE — ASSESSMENT
[FreeTextEntry1] : Overall feels better on current treatment although spirometry has declined. We'll continue to monitor

## 2019-05-26 NOTE — PHYSICAL EXAM
[General Appearance - Well Developed] : well developed [General Appearance - Well Nourished] : well nourished [Normal Oropharynx] : normal oropharynx [Heart Rate And Rhythm] : heart rate and rhythm were normal [Heart Sounds] : normal S1 and S2 [Abdomen Soft] : soft [Abdomen Tenderness] : non-tender [Abdomen Mass (___ Cm)] : no abdominal mass palpated [Gait - Sufficient For Exercise Testing] : the gait was sufficient for exercise testing [Abnormal Walk] : normal gait [Nail Clubbing] : no clubbing of the fingernails [Cyanosis, Localized] : no localized cyanosis [Petechial Hemorrhages (___cm)] : no petechial hemorrhages [Skin Color & Pigmentation] : normal skin color and pigmentation [] : no rash [No Venous Stasis] : no venous stasis [Skin Lesions] : no skin lesions [No Skin Ulcers] : no skin ulcer [No Xanthoma] : no  xanthoma was observed [Deep Tendon Reflexes (DTR)] : deep tendon reflexes were 2+ and symmetric [Sensation] : the sensory exam was normal to light touch and pinprick [No Focal Deficits] : no focal deficits [Oriented To Time, Place, And Person] : oriented to person, place, and time [Impaired Insight] : insight and judgment were intact [Affect] : the affect was normal [FreeTextEntry1] : minimal expiratory wheeze

## 2019-06-25 ENCOUNTER — APPOINTMENT (OUTPATIENT)
Dept: PULMONOLOGY | Facility: CLINIC | Age: 54
End: 2019-06-25
Payer: COMMERCIAL

## 2019-06-25 VITALS — TEMPERATURE: 98.1 F | HEART RATE: 66 BPM | OXYGEN SATURATION: 98 %

## 2019-06-25 VITALS — DIASTOLIC BLOOD PRESSURE: 92 MMHG | SYSTOLIC BLOOD PRESSURE: 164 MMHG

## 2019-06-25 PROCEDURE — 99213 OFFICE O/P EST LOW 20 MIN: CPT | Mod: 25

## 2019-06-25 PROCEDURE — 95800 SLP STDY UNATTENDED: CPT | Mod: 52

## 2019-06-25 PROCEDURE — 96401 CHEMO ANTI-NEOPL SQ/IM: CPT

## 2019-06-25 RX ORDER — MEPOLIZUMAB 100 MG/ML
100 INJECTION, POWDER, FOR SOLUTION SUBCUTANEOUS
Qty: 0 | Refills: 0 | Status: COMPLETED | OUTPATIENT
Start: 2019-06-25

## 2019-06-25 RX ADMIN — MEPOLIZUMAB 0 MG: 100 INJECTION, POWDER, FOR SOLUTION SUBCUTANEOUS at 00:00

## 2019-06-27 NOTE — PHYSICAL EXAM
[General Appearance - Well Developed] : well developed [Normal Oropharynx] : normal oropharynx [General Appearance - Well Nourished] : well nourished [Heart Sounds] : normal S1 and S2 [Heart Rate And Rhythm] : heart rate and rhythm were normal [Abdomen Soft] : soft [FreeTextEntry1] : minimal expiratory wheeze [Abdomen Tenderness] : non-tender [Abdomen Mass (___ Cm)] : no abdominal mass palpated [Abnormal Walk] : normal gait [Nail Clubbing] : no clubbing of the fingernails [Cyanosis, Localized] : no localized cyanosis [Gait - Sufficient For Exercise Testing] : the gait was sufficient for exercise testing [Petechial Hemorrhages (___cm)] : no petechial hemorrhages [Skin Color & Pigmentation] : normal skin color and pigmentation [Skin Lesions] : no skin lesions [] : no rash [No Venous Stasis] : no venous stasis [No Xanthoma] : no  xanthoma was observed [No Skin Ulcers] : no skin ulcer [Deep Tendon Reflexes (DTR)] : deep tendon reflexes were 2+ and symmetric [Oriented To Time, Place, And Person] : oriented to person, place, and time [No Focal Deficits] : no focal deficits [Sensation] : the sensory exam was normal to light touch and pinprick [Impaired Insight] : insight and judgment were intact [Affect] : the affect was normal

## 2019-06-27 NOTE — HISTORY OF PRESENT ILLNESS
[FreeTextEntry1] : here for asthma f/u/injection\par to  HSAT device\par asthma is better. complaince with other meds unclear

## 2019-07-08 ENCOUNTER — FORM ENCOUNTER (OUTPATIENT)
Age: 54
End: 2019-07-08

## 2019-07-18 ENCOUNTER — APPOINTMENT (OUTPATIENT)
Dept: PULMONOLOGY | Facility: CLINIC | Age: 54
End: 2019-07-18
Payer: COMMERCIAL

## 2019-07-18 VITALS — OXYGEN SATURATION: 98 % | TEMPERATURE: 98.5 F | HEART RATE: 68 BPM

## 2019-07-18 VITALS — SYSTOLIC BLOOD PRESSURE: 134 MMHG | DIASTOLIC BLOOD PRESSURE: 82 MMHG

## 2019-07-18 DIAGNOSIS — J45.50 SEVERE PERSISTENT ASTHMA, UNCOMPLICATED: ICD-10-CM

## 2019-07-18 PROCEDURE — 96401 CHEMO ANTI-NEOPL SQ/IM: CPT

## 2019-07-18 PROCEDURE — 99213 OFFICE O/P EST LOW 20 MIN: CPT | Mod: 25

## 2019-07-18 RX ORDER — MEPOLIZUMAB 100 MG/ML
100 INJECTION, POWDER, FOR SOLUTION SUBCUTANEOUS
Qty: 0 | Refills: 0 | Status: COMPLETED | OUTPATIENT
Start: 2019-07-18

## 2019-07-18 RX ADMIN — MEPOLIZUMAB 0 MG: 100 INJECTION, POWDER, FOR SOLUTION SUBCUTANEOUS at 00:00

## 2019-07-21 NOTE — ASSESSMENT
[FreeTextEntry1] : Doing well from an asthma perspective\par \par Will need followup for sleep apnea after I review his home sleep study results

## 2019-07-21 NOTE — PHYSICAL EXAM
[General Appearance - Well Nourished] : well nourished [Normal Oropharynx] : normal oropharynx [General Appearance - Well Developed] : well developed [Heart Rate And Rhythm] : heart rate and rhythm were normal [Heart Sounds] : normal S1 and S2 [FreeTextEntry1] : minimal expiratory wheeze [Abdomen Tenderness] : non-tender [Abdomen Soft] : soft [Abnormal Walk] : normal gait [Gait - Sufficient For Exercise Testing] : the gait was sufficient for exercise testing [Abdomen Mass (___ Cm)] : no abdominal mass palpated [Nail Clubbing] : no clubbing of the fingernails [Cyanosis, Localized] : no localized cyanosis [Petechial Hemorrhages (___cm)] : no petechial hemorrhages [Skin Color & Pigmentation] : normal skin color and pigmentation [] : no rash [Skin Lesions] : no skin lesions [No Venous Stasis] : no venous stasis [Deep Tendon Reflexes (DTR)] : deep tendon reflexes were 2+ and symmetric [No Xanthoma] : no  xanthoma was observed [No Skin Ulcers] : no skin ulcer [Sensation] : the sensory exam was normal to light touch and pinprick [No Focal Deficits] : no focal deficits [Oriented To Time, Place, And Person] : oriented to person, place, and time [Impaired Insight] : insight and judgment were intact [Affect] : the affect was normal

## 2019-08-05 ENCOUNTER — APPOINTMENT (OUTPATIENT)
Dept: PULMONOLOGY | Facility: CLINIC | Age: 54
End: 2019-08-05

## 2019-08-20 ENCOUNTER — APPOINTMENT (OUTPATIENT)
Dept: PULMONOLOGY | Facility: CLINIC | Age: 54
End: 2019-08-20

## 2019-09-26 ENCOUNTER — APPOINTMENT (OUTPATIENT)
Dept: PULMONOLOGY | Facility: CLINIC | Age: 54
End: 2019-09-26

## 2019-11-26 ENCOUNTER — APPOINTMENT (OUTPATIENT)
Dept: PULMONOLOGY | Facility: CLINIC | Age: 54
End: 2019-11-26

## 2020-01-02 ENCOUNTER — APPOINTMENT (OUTPATIENT)
Dept: PULMONOLOGY | Facility: CLINIC | Age: 55
End: 2020-01-02

## 2020-02-06 ENCOUNTER — APPOINTMENT (OUTPATIENT)
Dept: PULMONOLOGY | Facility: CLINIC | Age: 55
End: 2020-02-06

## 2020-09-30 NOTE — DISCHARGE NOTE ADULT - NS AS DC STROKE ED MATERIALS
"Dali Martines is a 50 year old female who is being evaluated via a billable telephone visit.      The patient has been notified of following:     \"This telephone visit will be conducted via a call between you and your physician/provider. We have found that certain health care needs can be provided without the need for an in-person physical exam.  This service lets us provide the care you need with a telephone conversation.  If a prescription is necessary we can send it directly to your pharmacy.  If lab work is needed we can place an order for that and you can then stop by our lab to have the test done at a later time.    Telephone visits are billed at different rates depending on your insurance coverage.  Please reach out to your insurance provider with any questions.    If during the course of the call the physician/provider feels a telephone visit is not appropriate, you will not be charged for this service.\"    Patient has given verbal consent for telephone visit? Yes    How would you like to obtain your AVS? Dejon Santoyo MA    Patients Glucose Data was Sent via Email      Due to the COVID 19 pandemic this visit was converted to a telephone visit in order to help prevent spread of infection in this patient and the general population.    Time of start: 2:30 pm  Time of end: 2:32 pm  Total duration of telephone visit:  32 minutes.    HPI  Dali Martines is a 50 year old female with type 1 diabetes mellitus and hypothyroidism.  Telephone visit today for diabetes and hypothyroid follow up.  Pt was dx having type 1 diabetes at age 16.  Her hx is also significant for mild NPDR right eye only,  psoriatic arthritis, anxiety, hyperlipidemia and GERD.  For her diabetes, she is currently taking Tresiba 17 units daily, Novolog 1 unit/7 gms CHO with meals and snacks, plus correction - 1 unit drops BG 30 mg/mL.  She has been taking Fiasp with her evening meal which has helped reduce her hyperglycemia following " "her dinner meal.  Most recent A1C was 7.6 % on 6/26/2020.  I reviewed her DexcomG5 sensor download with her today and her average glucose is 163 with SD 71.  Her estimated A1C is 7.2 %.  Her glucose is in target 54 % of the time, above target 36 % of the time and below target 10 % of the time.  She has had some low blood sugars intermittently from 1 pm - 5 pm.  She is able to self treat her hypoglycemia and she wears her Dexcom sensor full time.  On ROS today, she feels stressed during the COVID19 pandemic and she also helps care for her parents.  Less nausea on lower dose of Trulicity.  She has had pain in her \" upper chest \" now for the past month. Pain is under the left clavicle.  The pain is intermittent and can last for up to 1 hr at both rest and with exertion.  No pain in the arms, n/v or diaphoresis.  She describes this pain as \" pressure pain\".  Intermittent dysuria and hematuria per patient. None at this time.  She denies fevers or chills.  Pt denies headaches, blurred vision, SOB at rest, cough, abd pain, diarrhea, numbness or tingling in her feet or hands.  She denies foot ulcers a this time.    Diabetes Care  Retinopathy:yes in right eye; pt seen by Oph here in Oct 2019 with mild NPDR right eye only.  Nephropathy:none; urine microalbuminuria negative in 12/2019.   Neuropathy: none.  Foot Exam: no exam today.  Taking aspirin: yes.  Lipids: LDL 92 and HDL 91 in 12/2018. Pt is taking Simvastatin.  CAD: no hx of CAD.  Mental health: hx of anxiety.  Insulin: Basal and meal time insulin with correction insulin. DM meds: low dose Trulicity.  Testing: DexcomG5 sensor.    ROS  Please see under HPI.    Allergies  Allergies   Allergen Reactions     Sulfasalazine      Developed rash, HA, dizziness       Medications  Current Outpatient Medications   Medication Sig Dispense Refill     albuterol (PROAIR HFA/PROVENTIL HFA/VENTOLIN HFA) 108 (90 Base) MCG/ACT inhaler Inhale 2 puffs into the lungs every 4 hours as needed " for shortness of breath / dyspnea or wheezing 1 Inhaler 11     aspirin 81 MG tablet Take 1 tablet by mouth daily.       blood glucose (ACCU-CHEK COLIN PLUS) test strip Test Blood Sugar 8 times daily or as directed 800 strip 3     blood glucose monitoring (ACCU-CHEK FASTCLIX) lancets Use to test blood sugar 8 times daily or as directed. 4 Box 3     Calcium Carbonate-Vitamin D (CALCIUM + D PO) Take one daily       citalopram (CELEXA) 20 MG tablet Take 1.5 tablets (30 mg) by mouth daily 135 tablet 1     Continuous Blood Gluc Sensor (DEXCOM G5 MOB/G4 PLAT SENSOR) MISC CHANGE EVERY 7 DAYS 12 each 4     Continuous Blood Gluc Transmit (DEXCOM G4 PLATINUM TRANSMITTER) MISC 1 each every 6 months 1 each 1     diclofenac (VOLTAREN) 1 % topical gel APPLY 2 GRAMS ONTO THE SKIN FOUR TIMES DAILY AS NEEDED FOR MODERATE PAIN 100 g 5     dulaglutide (TRULICITY) 0.75 MG/0.5ML pen Inject 0.75 mg Subcutaneous every 7 days 2 mL 6     dulaglutide (TRULICITY) 1.5 MG/0.5ML pen Inject 0.75 mg Subcutaneous every 7 days 6 mL 3     econazole nitrate 1 % cream Apply 0.5 inches topically daily.       fish oil-omega-3 fatty acids (FISH OIL) 1000 MG capsule Take one daily       folic acid (FOLVITE) 1 MG tablet Take 1 tablet (1 mg) by mouth daily 90 tablet 2     insulin aspart (FIASP FLEXTOUCH) 100 UNIT/ML pen-injector Use as directed up to 20 units a day at the start of meals 3 mL 3     insulin degludec (TRESIBA) 100 UNIT/ML pen Inject 16 units subcutaneous at hs. 15 mL 3     insulin pen needle (B-D U/F) 31G X 8 MM miscellaneous Use 5 pen needles daily 450 each 3     Ketoconazole (NIZORAL PO) Take  by mouth. Shampoo daily       levothyroxine (SYNTHROID/LEVOTHROID) 112 MCG tablet Take 1 tablet (112 mcg) by mouth daily 90 tablet 3     methotrexate 2.5 MG tablet Take 6 tablets (15 mg) by mouth every 7 days 24 tablet 3     methylphenidate (METADATE CD) 20 MG CR capsule Take 20 mg by mouth daily       Minoxidil (ROGAINE EX) Externally apply  topically.  daily       mometasone-formoterol (DULERA) 100-5 MCG/ACT inhaler Inhale 2 puffs into the lungs 2 times daily 3 Inhaler 11     montelukast (SINGULAIR) 5 MG chewable tablet Take 1 tablet (5 mg) by mouth At Bedtime 90 tablet 3     Multiple Vitamin (MULTIVITAMIN OR) Take  by mouth. Take one daily tab       NOVOLOG PENFILL 100 UNIT/ML soln INJECT 1 UNIT PER 15 GRAMS CHO AT ALL MEALS AND SNACKS WITH CORRECTION SCALE OF 1 UNIT PER 50 MG/DL OVER 150, AVERAGE DAILY USE OF 30 UNITS 30 mL 4     simvastatin (ZOCOR) 20 MG tablet Take 1 tablet (20 mg) by mouth At Bedtime 90 tablet 3     traZODone (DESYREL) 50 MG tablet Take 1-2 tablets by mouth nightly as needed for sleep. 180 tablet 0       Family History  family history includes Alcoholism in her father; Anxiety Disorder in her father; Arthritis in her father; Breast Cancer in her mother; C.A.D. in her father and maternal grandmother; Cancer in her paternal grandfather; Cardiac Sudden Death in her maternal grandfather; Cerebrovascular Disease in her paternal grandmother; Circulatory in her father; Coronary Artery Disease in her father and maternal grandmother; Diabetes in her father, maternal grandmother, and another family member; Eye Disorder in her father; Gynecology in an other family member; Heart Disease in her father, maternal grandfather, maternal grandmother, and paternal grandfather; Hypothyroidism in her father; Lipids in her father; Macular Degeneration in her father and maternal aunt; Rheumatoid Arthritis in her father; Thyroid Disease in her father and another family member.    Social History   reports that she has never smoked. She has never used smokeless tobacco. She reports current alcohol use. She reports that she does not use drugs.     Past Medical History  Past Medical History:   Diagnosis Date     Anemia      Anxiety      Arthritis 2014    Psoriatic arthritis     Back injury 1988     Dry eye syndrome      Dyslipidemia      Endometriosis 2002    adehsions  seen at laparoscopy     GERD (gastroesophageal reflux disease)      Hypothyroidism     9 yoa     SOB (shortness of breath) 3/11/2014     Type I (juvenile type) diabetes mellitus without mention of complication, not stated as uncontrolled     when 16      Uncomplicated asthma Fall 2013       Past Surgical History:   Procedure Laterality Date     C REPAIR CRUCIATE LIGAMENT,KNEE       C STOMACH SURGERY PROCEDURE UNLISTED  December 2002     COLONOSCOPY  2002     COLONOSCOPY N/A 6/23/2020    Procedure: COLONOSCOPY;  Surgeon: Lauryn Rivera MD;  Location:  GI     ESOPHAGOSCOPY, GASTROSCOPY, DUODENOSCOPY (EGD), COMBINED  1/7/2014    Procedure: COMBINED ESOPHAGOSCOPY, GASTROSCOPY, DUODENOSCOPY (EGD), BIOPSY SINGLE OR MULTIPLE;;  Surgeon: Kraig Nicole MD;  Location:  GI     GYN SURGERY      Laparotomy to remove endometrial tissue     HC BREATH HYDROGEN TEST N/A 6/17/2014    Procedure: HYDROGEN BREATH TEST;  Surgeon: Deacon Alberts MD;  Location:  GI     HYDROGEN BREATH TEST  6/17/14     LAPAROSCOPIC APPENDECTOMY  2002     LAPAROTOMY, LYSIS ADHESIONS, COMBINED  2002    endometriosis     SOFT TISSUE SURGERY  December 2014    right ankle tendon injury       Physical Exam    No exam.    RESULTS  Creatinine   Date Value Ref Range Status   06/18/2020 0.56 0.52 - 1.04 mg/dL Final     GFR Estimate   Date Value Ref Range Status   06/18/2020 >90 >60 mL/min/[1.73_m2] Final     Comment:     Non  GFR Calc  Starting 12/18/2018, serum creatinine based estimated GFR (eGFR) will be   calculated using the Chronic Kidney Disease Epidemiology Collaboration   (CKD-EPI) equation.       Microalbumin Urine   Date Value Ref Range Status   06/26/2009 5@ MG/L      Hemoglobin A1C   Date Value Ref Range Status   06/26/2020 7.6 (H) 0 - 5.6 % Final     Comment:     Normal <5.7% Prediabetes 5.7-6.4%  Diabetes 6.5% or higher - adopted from ADA   consensus guidelines.       Potassium   Date Value Ref Range Status    04/27/2016 4.1 3.4 - 5.3 mmol/L Final     ALT   Date Value Ref Range Status   06/18/2020 33 0 - 50 U/L Final     AST   Date Value Ref Range Status   06/18/2020 23 0 - 45 U/L Final     TSH   Date Value Ref Range Status   12/17/2019 1.26 0.40 - 4.00 mU/L Final     T4 Total   Date Value Ref Range Status   11/29/2010 8.5 5.0 - 11.0 ug/dL Final     T4 Free   Date Value Ref Range Status   03/21/2019 1.09 0.76 - 1.46 ng/dL Final       Cholesterol   Date Value Ref Range Status   12/18/2018 194 <200 mg/dL Final   09/27/2013 211 (H) 0 - 200 mg/dL Final     Comment:     LDL Cholesterol is the primary guide to therapy.   The NCEP recommends further evaluation of: patients with cholesterol greater   than 200 mg/dL if additional risk factors are present, cholesterol greater   than   240 mg/dL, triglycerides greater than 150 mg/dL, or HDL less than 40 mg/dL.     HDL Cholesterol   Date Value Ref Range Status   12/18/2018 91 >49 mg/dL Final   09/27/2013 78 50 - 110 mg/dL Final     LDL Cholesterol Calculated   Date Value Ref Range Status   12/18/2018 92 <100 mg/dL Final     Comment:     Desirable:       <100 mg/dl   09/27/2013 123 0 - 129 mg/dL Final     Comment:     LDL Cholesterol is the primary guide to therapy: LDL-cholesterol goal in high   risk patients is <100 mg/dL and in very high risk patients is <70 mg/dL.     Triglycerides   Date Value Ref Range Status   12/18/2018 54 <150 mg/dL Final   09/27/2013 51 0 - 150 mg/dL Final     Cholesterol/HDL Ratio   Date Value Ref Range Status   09/27/2013 2.7 0.0 - 5.0 Final   02/15/2013 2.9 0.0 - 5.0 Final       A1C   7.6 % on 6/26/2020      ASSESSMENT/PLAN:    1. TYPE 1 DIABETES MELLITUS: We talked about upgrading her Dexcom sensor to the DexcomG6 sensor today and use of the Tandem insulin pump - control IQ.  Dali would like to proceed with upgrading her Dexcom sensor and is interested in the Tandem insulin pump.  I have referred he to meet with our CDE Dali Viera.  No change in  insulin doses today.  I did change her CF to 50 today ( was 30 ).  Will continue low dose Trulicity.  Her urine microalbuminuria has been negative with normal creat/GFR.  She denies sx of neuropathy or foot ulcers.  Pt is taking ASA daily.    2.  RETINOPATHY: Hx of mild NPDR right eye only.  She was seen here by Oph in Oct 2020.  She will be seeing Oph in a few weeks.    3.  CHEST PAIN: I asked her to see Dr. Alas to report chest pain for evaluation.    4.  DYSURIA/HEMATURIA: Intermittent dysuria and hematuria.  Check UA.    5.  FOLLOW UP: with me or Dr. Mckeon in 3 months.   Call 911 for Stroke/Risk Factors for Stroke/Stroke Warning Signs and Symptoms/Prescribed Medications/Need for Followup After Discharge/Stroke Education Booklet

## 2021-08-29 NOTE — CONSULT NOTE ADULT - PROBLEM/RECOMMENDATION-3
1905  Resting quietly, awake with no c/o during bedside report received from Isiah eBcerril RN, including NIH/neuro assessment with no deficits. 1922  AP reg, lungs sounds clear. Reports standing and walking steadily with no hx of falls. Reports voiding without difficulty. Call light in reach. No needs, no distress. DISPLAY PLAN FREE TEXT

## 2022-03-03 ENCOUNTER — EMERGENCY (EMERGENCY)
Facility: HOSPITAL | Age: 57
LOS: 1 days | Discharge: HOME | End: 2022-03-03
Attending: EMERGENCY MEDICINE
Payer: COMMERCIAL

## 2022-03-03 VITALS
RESPIRATION RATE: 18 BRPM | WEIGHT: 197.09 LBS | HEIGHT: 69 IN | TEMPERATURE: 98 F | DIASTOLIC BLOOD PRESSURE: 111 MMHG | OXYGEN SATURATION: 96 % | HEART RATE: 82 BPM | SYSTOLIC BLOOD PRESSURE: 172 MMHG

## 2022-03-03 DIAGNOSIS — Z95.5 PRESENCE OF CORONARY ANGIOPLASTY IMPLANT AND GRAFT: Chronic | ICD-10-CM

## 2022-03-03 LAB
ALBUMIN SERPL ELPH-MCNC: 4.5 G/DL — SIGNIFICANT CHANGE UP (ref 3.3–5)
ALP SERPL-CCNC: 68 U/L — SIGNIFICANT CHANGE UP (ref 40–120)
ALT FLD-CCNC: 25 U/L — SIGNIFICANT CHANGE UP (ref 10–45)
ANION GAP SERPL CALC-SCNC: 12 MMOL/L — SIGNIFICANT CHANGE UP (ref 5–17)
APTT BLD: 28.7 SEC — SIGNIFICANT CHANGE UP (ref 27.5–35.5)
AST SERPL-CCNC: 21 U/L — SIGNIFICANT CHANGE UP (ref 10–40)
BASOPHILS # BLD AUTO: 0.04 K/UL — SIGNIFICANT CHANGE UP (ref 0–0.2)
BASOPHILS NFR BLD AUTO: 0.4 % — SIGNIFICANT CHANGE UP (ref 0–2)
BILIRUB SERPL-MCNC: 0.4 MG/DL — SIGNIFICANT CHANGE UP (ref 0.2–1.2)
BUN SERPL-MCNC: 20 MG/DL — SIGNIFICANT CHANGE UP (ref 7–23)
CALCIUM SERPL-MCNC: 10.2 MG/DL — SIGNIFICANT CHANGE UP (ref 8.4–10.5)
CHLORIDE SERPL-SCNC: 96 MMOL/L — SIGNIFICANT CHANGE UP (ref 96–108)
CO2 SERPL-SCNC: 27 MMOL/L — SIGNIFICANT CHANGE UP (ref 22–31)
CREAT SERPL-MCNC: 1 MG/DL — SIGNIFICANT CHANGE UP (ref 0.5–1.3)
EGFR: 88 ML/MIN/1.73M2 — SIGNIFICANT CHANGE UP
EOSINOPHIL # BLD AUTO: 0.23 K/UL — SIGNIFICANT CHANGE UP (ref 0–0.5)
EOSINOPHIL NFR BLD AUTO: 2.5 % — SIGNIFICANT CHANGE UP (ref 0–6)
GLUCOSE SERPL-MCNC: 221 MG/DL — HIGH (ref 70–99)
HCT VFR BLD CALC: 47.3 % — SIGNIFICANT CHANGE UP (ref 39–50)
HGB BLD-MCNC: 15.8 G/DL — SIGNIFICANT CHANGE UP (ref 13–17)
IMM GRANULOCYTES NFR BLD AUTO: 0.3 % — SIGNIFICANT CHANGE UP (ref 0–1.5)
INR BLD: 0.99 RATIO — SIGNIFICANT CHANGE UP (ref 0.88–1.16)
LYMPHOCYTES # BLD AUTO: 2.49 K/UL — SIGNIFICANT CHANGE UP (ref 1–3.3)
LYMPHOCYTES # BLD AUTO: 27.6 % — SIGNIFICANT CHANGE UP (ref 13–44)
MCHC RBC-ENTMCNC: 28.4 PG — SIGNIFICANT CHANGE UP (ref 27–34)
MCHC RBC-ENTMCNC: 33.4 GM/DL — SIGNIFICANT CHANGE UP (ref 32–36)
MCV RBC AUTO: 85.1 FL — SIGNIFICANT CHANGE UP (ref 80–100)
MONOCYTES # BLD AUTO: 0.53 K/UL — SIGNIFICANT CHANGE UP (ref 0–0.9)
MONOCYTES NFR BLD AUTO: 5.9 % — SIGNIFICANT CHANGE UP (ref 2–14)
NEUTROPHILS # BLD AUTO: 5.7 K/UL — SIGNIFICANT CHANGE UP (ref 1.8–7.4)
NEUTROPHILS NFR BLD AUTO: 63.3 % — SIGNIFICANT CHANGE UP (ref 43–77)
NRBC # BLD: 0 /100 WBCS — SIGNIFICANT CHANGE UP (ref 0–0)
PLATELET # BLD AUTO: 255 K/UL — SIGNIFICANT CHANGE UP (ref 150–400)
POTASSIUM SERPL-MCNC: 3.9 MMOL/L — SIGNIFICANT CHANGE UP (ref 3.5–5.3)
POTASSIUM SERPL-SCNC: 3.9 MMOL/L — SIGNIFICANT CHANGE UP (ref 3.5–5.3)
PROT SERPL-MCNC: 7.9 G/DL — SIGNIFICANT CHANGE UP (ref 6–8.3)
PROTHROM AB SERPL-ACNC: 11.5 SEC — SIGNIFICANT CHANGE UP (ref 10.5–13.4)
RBC # BLD: 5.56 M/UL — SIGNIFICANT CHANGE UP (ref 4.2–5.8)
RBC # FLD: 13.3 % — SIGNIFICANT CHANGE UP (ref 10.3–14.5)
SARS-COV-2 RNA SPEC QL NAA+PROBE: SIGNIFICANT CHANGE UP
SODIUM SERPL-SCNC: 135 MMOL/L — SIGNIFICANT CHANGE UP (ref 135–145)
TROPONIN T, HIGH SENSITIVITY RESULT: 18 NG/L — SIGNIFICANT CHANGE UP (ref 0–51)
TROPONIN T, HIGH SENSITIVITY RESULT: 20 NG/L — SIGNIFICANT CHANGE UP (ref 0–51)
WBC # BLD: 9.02 K/UL — SIGNIFICANT CHANGE UP (ref 3.8–10.5)
WBC # FLD AUTO: 9.02 K/UL — SIGNIFICANT CHANGE UP (ref 3.8–10.5)

## 2022-03-03 PROCEDURE — 99220: CPT

## 2022-03-03 PROCEDURE — 70496 CT ANGIOGRAPHY HEAD: CPT | Mod: 26,MA

## 2022-03-03 PROCEDURE — 70450 CT HEAD/BRAIN W/O DYE: CPT | Mod: 26,MA,59

## 2022-03-03 PROCEDURE — 70498 CT ANGIOGRAPHY NECK: CPT | Mod: 26,MA

## 2022-03-03 PROCEDURE — G1004: CPT

## 2022-03-03 PROCEDURE — 71045 X-RAY EXAM CHEST 1 VIEW: CPT | Mod: 26

## 2022-03-03 PROCEDURE — 70551 MRI BRAIN STEM W/O DYE: CPT | Mod: 26,MG

## 2022-03-03 PROCEDURE — 0042T: CPT

## 2022-03-03 RX ORDER — ASPIRIN/CALCIUM CARB/MAGNESIUM 324 MG
81 TABLET ORAL DAILY
Refills: 0 | Status: DISCONTINUED | OUTPATIENT
Start: 2022-03-03 | End: 2022-03-06

## 2022-03-03 RX ORDER — MECLIZINE HCL 12.5 MG
25 TABLET ORAL ONCE
Refills: 0 | Status: COMPLETED | OUTPATIENT
Start: 2022-03-03 | End: 2022-03-03

## 2022-03-03 RX ORDER — MONTELUKAST 4 MG/1
10 TABLET, CHEWABLE ORAL DAILY
Refills: 0 | Status: DISCONTINUED | OUTPATIENT
Start: 2022-03-03 | End: 2022-03-06

## 2022-03-03 RX ORDER — CLOPIDOGREL BISULFATE 75 MG/1
300 TABLET, FILM COATED ORAL ONCE
Refills: 0 | Status: COMPLETED | OUTPATIENT
Start: 2022-03-03 | End: 2022-03-03

## 2022-03-03 RX ORDER — ATORVASTATIN CALCIUM 80 MG/1
80 TABLET, FILM COATED ORAL AT BEDTIME
Refills: 0 | Status: DISCONTINUED | OUTPATIENT
Start: 2022-03-03 | End: 2022-03-06

## 2022-03-03 RX ORDER — SODIUM CHLORIDE 9 MG/ML
1000 INJECTION INTRAMUSCULAR; INTRAVENOUS; SUBCUTANEOUS ONCE
Refills: 0 | Status: COMPLETED | OUTPATIENT
Start: 2022-03-03 | End: 2022-03-03

## 2022-03-03 RX ORDER — MECLIZINE HCL 12.5 MG
25 TABLET ORAL ONCE
Refills: 0 | Status: DISCONTINUED | OUTPATIENT
Start: 2022-03-03 | End: 2022-03-03

## 2022-03-03 RX ORDER — BUDESONIDE AND FORMOTEROL FUMARATE DIHYDRATE 160; 4.5 UG/1; UG/1
2 AEROSOL RESPIRATORY (INHALATION)
Refills: 0 | Status: DISCONTINUED | OUTPATIENT
Start: 2022-03-03 | End: 2022-03-06

## 2022-03-03 RX ADMIN — BUDESONIDE AND FORMOTEROL FUMARATE DIHYDRATE 2 PUFF(S): 160; 4.5 AEROSOL RESPIRATORY (INHALATION) at 21:49

## 2022-03-03 RX ADMIN — ATORVASTATIN CALCIUM 80 MILLIGRAM(S): 80 TABLET, FILM COATED ORAL at 21:48

## 2022-03-03 RX ADMIN — CLOPIDOGREL BISULFATE 300 MILLIGRAM(S): 75 TABLET, FILM COATED ORAL at 21:48

## 2022-03-03 RX ADMIN — Medication 81 MILLIGRAM(S): at 21:48

## 2022-03-03 RX ADMIN — SODIUM CHLORIDE 1000 MILLILITER(S): 9 INJECTION INTRAMUSCULAR; INTRAVENOUS; SUBCUTANEOUS at 13:09

## 2022-03-03 NOTE — ED ADULT NURSE NOTE - NSICDXFAMILYHX_GEN_ALL_CORE_FT
FAMILY HISTORY:  Family history of hypertension in mother, Mother    Sibling  Still living? No  Family history of meningitis, Age at diagnosis: Age Unknown

## 2022-03-03 NOTE — ED CDU PROVIDER INITIAL DAY NOTE - ATTENDING CONTRIBUTION TO CARE
Attending MD Katy Cobos:   I personally have seen and examined this patient.  Physician assistant note reviewed and agree on plan of care and except where noted.  See HPI, PE, and MDM for details.

## 2022-03-03 NOTE — ED ADULT NURSE NOTE - NSICDXPASTMEDICALHX_GEN_ALL_CORE_FT
PAST MEDICAL HISTORY:  CAD (coronary artery disease) 2009; stent    HTN (hypertension)     Intracranial hemorrhage 2008    Myocardial infarction, unspecified MI type, unspecified artery     Respiratory arrest december 1st

## 2022-03-03 NOTE — CONSULT NOTE ADULT - ASSESSMENT
Recommendations:   Imaging/Labs  [] MRI brain w/o contrast  [] TTE with bubble study and telemetry to look for a cardiac source of embolism   [] HbA1C and Lipid Panel    Meds  [] Aspirin 81mg and Plavix 300mg loading dose, then ASA81/[id number] for 3 weeks followed by ASA 81 alone indefinitely per [facility]ANCE trial   [] Atorvastatin 40-80mg QHS, titrate to LDL<70  [] DVT prophylaxis     Other  [] Telemonitoring; Neurochecks and vital signs per unit protocol  [] Permissive HTN up to 220/120 mmHg for first 24 hours after symptom onset followed by gradual normotension   [] BG goal <180, avoid hypglycemia  [] NPO until clears dysphagia screen, otherwise swallow evaluation  [] PT/OT eval  [] Fall precautions  [] Stroke education provided     Case discussed with stroke fellow Dr. Gibbons, under supervision of Vascular Neurology attending, Dr. Izquierdo. 56 y.o. 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 LKN 09:00 this AM. VS on presentation notable for /111. Exam significant for *** Basic labs wnl. Preliminary read of CTH w/o and CTA H/N did not demonstrate acute pathology.      Impression:     Recommendations:   Imaging/Labs  [] MRI brain w/o contrast  [] TTE with bubble study and telemetry to look for a cardiac source of embolism   [] HbA1C and Lipid Panel    Meds  [] Aspirin 81mg and Plavix 300mg loading dose, then [id number]/[id number] for 3 weeks followed by ASA 81 alone indefinitely per [facility]ANCE trial   [] Atorvastatin 40-80mg QHS, titrate to LDL<70  [] DVT prophylaxis     Other  [] Telemonitoring; Neurochecks and vital signs per unit protocol  [] Permissive HTN up to 220/120 mmHg for first 24 hours after symptom onset followed by gradual normotension   [] BG goal <180, avoid hypglycemia  [] NPO until clears dysphagia screen, otherwise swallow evaluation  [] PT/OT eval  [] Fall precautions  [] Stroke education provided     Case discussed with stroke fellow Dr. Gibbons, under supervision of Vascular Neurology attending, Dr. Izquierdo. 56 y.o. 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 LKN 09:00 this AM. VS on presentation notable for /111. Exam significant for *** Basic labs wnl. Preliminary read of CTH w/o and CTA H/N did not demonstrate acute pathology.      Impression:     Recommendations:   Imaging/Labs  [] MRI brain w/o contrast  [] TTE with bubble study and telemetry to look for a cardiac source of embolism   [] ILR   [] HbA1C and Lipid Panel    Meds  [] Aspirin 81mg and Plavix 300mg loading dose, then ASA81/[id number] for 3 weeks followed by ASA 81 alone indefinitely per [facility]ANCE trial   [] Atorvastatin 40-80mg QHS, titrate to LDL<70  [] DVT prophylaxis     Other  [] Telemonitoring; Neurochecks and vital signs per unit protocol  [] Permissive HTN up to 220/120 mmHg for first 24 hours after symptom onset followed by gradual normotension   [] BG goal <180, avoid hypglycemia  [] NPO until clears dysphagia screen, otherwise swallow evaluation  [] PT/OT eval  [] Fall precautions  [] Stroke education provided     Case discussed with stroke fellow Dr. Gibbons, under supervision of Vascular Neurology attending, Dr. Izquierdo. 56 y.o. 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 LKN 09:00 this AM. VS on presentation notable for /111. On exam patient had a slightly widened base with a slow but ultimately leftward trajectory on prolonged assessment despite attempting to walk in a straight line. Basic labs wnl. Preliminary read of CTH w/o and CTA H/N did not demonstrate acute pathology.      Impression: Dizziness and left truncal lateropulsion likely localizing to the posterior circulation, most likely due to an acute ischemic stroke. Mechanism unknown at this time.     Recommendations:   Imaging/Labs  [] MRI brain w/o contrast  [] TTE with bubble study and telemetry to look for a cardiac source of embolism   [] Consider EP for ILR  [] HbA1C and Lipid Panel    Meds  [] Aspirin 81mg and Plavix 300mg loading dose, then ASA81/Speaud86 for 3 weeks followed by ASA 81 alone indefinitely per [facility]ANCE trial   [] Atorvastatin 40-80mg QHS, titrate to LDL<70  [] DVT prophylaxis     Other  [] Telemonitoring; Neurochecks and vital signs per unit protocol  [] Permissive HTN up to 220/120 mmHg for first 24 hours after symptom onset followed by gradual normotension   [] BG goal <180, avoid hypoglycemia  [] NPO until clears dysphagia screen, otherwise swallow evaluation  [] PT/OT eval  [] Fall precautions  [] Stroke education provided   [] Patient can follow up with , [address] [facility], [place], [place]; ([phone]).   .    Case discussed with stroke fellow Dr. Gibbons, under supervision of Vascular Neurology attending, Dr. Izquierdo. 56 y.o. 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 LKN 09:00 this AM. VS on presentation notable for /111. On exam patient had a slightly widened base with a slow but ultimately leftward trajectory on prolonged assessment despite attempting to walk in a straight line, however no dysmetria or other gross impairment in coordination noted. Basic labs wnl. CTH w/o demonstrates chronic right centrum semiovale/corona radiata, mesial thalamic, and posterior cerebellar hemisphere infarcts and CTA H/N did not report evidence of arterial dissection or flow-limiting stenosis.      Impression: Dizziness and left truncal lateropulsion possibly localizing to the posterior circulation, perhaps the ipsilateral cerebellum, most likely due to an acute ischemic stroke. Mechanism unknown at this time.     Recommendations:   Imaging/Labs  [] MRI brain w/o contrast  [] TTE with bubble study   [] EP for internal loop recorder  [] HbA1C and Lipid Panel    Meds  [] Aspirin 81mg and Plavix 300mg loading dose, then ASA81/[id number] for 3 weeks followed by ASA 81 alone indefinitely per CHANCE trial   [] Atorvastatin 40-80mg QHS, titrate to LDL<70  [] DVT prophylaxis     Other  [] Telemonitoring; Neurochecks and vital signs per unit protocol  [] Permissive HTN up to 220/120 mmHg for first 24 hours after symptom onset followed by gradual normotension   [] BG goal <180, avoid hypoglycemia  [] NPO until clears dysphagia screen, otherwise swallow evaluation  [] PT/OT eval  [] Fall precautions  [] Stroke education provided   [] Patient can follow up with Dr. Izquierdo, [address], [place], [place]; ([phone]).       Case discussed with stroke fellow Dr. Gibbons, under supervision of Vascular Neurology attending, Dr. Izquierdo. 56 y.o. 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 LKN 09:00 this AM. VS on presentation notable for /111. On exam patient had a slightly widened base with a slow but ultimately leftward trajectory on prolonged assessment despite attempting to walk in a straight line, however no dysmetria or other gross impairment in coordination noted. Basic labs wnl. CTH w/o demonstrates chronic right centrum semiovale/corona radiata, mesial thalamic, and posterior cerebellar hemisphere infarcts and CTA H/N did not report evidence of arterial dissection or flow-limiting stenosis.      Impression: Dizziness and left truncal lateropulsion possibly localizing to the posterior circulation, perhaps the ipsilateral cerebellum, most likely due to an acute ischemic stroke. Mechanism unknown at this time.     Recommendations:   Imaging/Labs  [] MRI brain w/o contrast  [] TTE with bubble study   [] EP for internal loop recorder  [] HbA1C and Lipid Panel    Meds  [] Aspirin 81mg and Plavix 300mg loading dose, then ASA81/Jceyjv60 for 3 weeks followed by ASA 81 alone indefinitely per [facility]ANCE trial   [] Atorvastatin 40-80mg QHS, titrate to LDL<70  [] DVT prophylaxis     Other  [] Telemonitoring; Neurochecks and vital signs per unit protocol  [] Permissive HTN up to 220/120 mmHg for first 24 hours after symptom onset followed by gradual normotension   [] BG goal <180, avoid hypoglycemia  [] NPO until clears dysphagia screen, otherwise swallow evaluation  [] PT/OT eval  [] Fall precautions  [] Follow-up/evaluation of enlarged cervical chain lymph nodes as recommended  [] Stroke education provided   [] Patient can follow up with Dr. Izquierdo, [address], [place], [place]; ([phone]).       Case discussed with stroke fellow Dr. Gibbons, under supervision of Vascular Neurology attending, Dr. Izquierdo. 56 y.o. 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 LKN 09:00 this AM. VS on presentation notable for /111. On exam patient had a slightly widened base with a slow but ultimately leftward trajectory on prolonged assessment despite attempting to walk in a straight line, however no objective weakness, dysmetria or other gross impairment in coordination noted. Basic labs wnl. CTH w/o demonstrates chronic right centrum semiovale/corona radiata, mesial thalamic, and posterior cerebellar hemisphere infarcts and CTA H/N did not report evidence of arterial dissection or flow-limiting stenosis.      Impression: Dizziness and left truncal lateropulsion possibly localizing to the posterior circulation/ipsilateral cerebellum, vs secondary to right brain dysfunction, most likely due to an acute ischemic stroke. Mechanism unknown at this time.     Recommendations:   Imaging/Labs  [] MRI brain w/o contrast  [] TTE with bubble study   [] EP for internal loop recorder  [] HbA1C and Lipid Panel    Meds  [] Aspirin 81mg and Plavix 300mg loading dose, then ASA81/Vecjko06 for 3 weeks followed by ASA 81 alone indefinitely per CHANCE trial   [] Atorvastatin 40-80mg QHS, titrate to LDL<70  [] DVT prophylaxis     Other  [] Telemonitoring; Neurochecks and vital signs per unit protocol  [] Permissive HTN up to 220/120 mmHg for first 24 hours after symptom onset followed by gradual normotension   [] BG goal <180, avoid hypoglycemia  [] NPO until clears dysphagia screen, otherwise swallow evaluation  [] PT/OT eval  [] Fall precautions  [] Follow-up/evaluation of enlarged cervical chain lymph nodes as recommended  [] Stroke education provided   [] Patient can follow up with Dr. Izquierdo, [address], [place], [place]; ([phone]).       Case discussed with stroke fellow Dr. Gibbons, under supervision of Vascular Neurology attending, Dr. Izquierdo.

## 2022-03-03 NOTE — ED CDU PROVIDER INITIAL DAY NOTE - OBJECTIVE STATEMENT
57yo M with Hx of HTN, CAD, ICH (2008) presenting with complaints of dizziness and weakness. woke this AM at 6:30 feeling entirely normal, at 9:15 states that he felt himself leaning towards the left with left sided upper and lower extremity weakness with dizziness. went to his primary care doctors office who then referred him to the ED for evaluation. pt denies any f/c, falls, change in vision, slurred speech, nausea, vomiting.    In the ED VSS.  Labs unremarkable.  CT/CTA head and neck negative for acute pathology. Neuro consult recommending MRI.  Plan to place patient in the CDU overnight for tele monitoring and MRI.

## 2022-03-03 NOTE — ED PROVIDER NOTE - PROGRESS NOTE DETAILS
Attending Katy Cobos: d/w neuro. not a tpa candidate leatha garcia pgy3: spoke with neurology, recommending MRI imaging and bubble study, discussed with CDU will see pt.

## 2022-03-03 NOTE — CONSULT NOTE ADULT - ATTENDING COMMENTS
code stroke called and neurology emergently assessed patient.   Briefly,  56 y.o. 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 LKN 09:00 this AM. VS on presentation notable for /111.   CTH w/o demonstrates chronic right centrum semiovale/corona radiata, mesial thalamic, and posterior cerebellar hemisphere infarcts   CTA H/N did not report evidence of arterial dissection or flow-limiting stenosis.    MRI brain with no acute fidnings, chronic R thalalmic and L occipital infarcts   A1c 10.9      Impression: Dizziness likely peripheral in etiology. MRI ruled out stroke.      Recommendations:   - c/w AP and statin therapy.   - f/u lipid panel   - needs better risk factor control and DM control  - no objection to d/c from stroke standpoint   - consider ENT otupatient and vestibular therapy  - still c/o LLE numbness may be 2/2 old R thalalmic infarct  - can consider outpatient MRI L spine in future if symptoms persist   - check FS, glucose control <180  - GI/DVT ppx  - Counseling on diet, exercise, and medication adherence was done  - Counseling on smoking cessation and alcohol consumption offered when appropriate.  - Pain assessed and judicious use of narcotics when appropriate was discussed.    - Stroke education given when appropriate.  - Importance of fall prevention discussed.   - Differential diagnosis and plan of care discussed with patient and/or family and primary team  - Thank you for allowing me to participate in the care of this patient. Call with questions.   Alexis Izquierdo MD  Vascular Neurology  Office: [phone]

## 2022-03-03 NOTE — ED PROVIDER NOTE - NSICDXFAMILYHX_GEN_ALL_CORE_FT
FAMILY HISTORY:  Family history of hypertension in mother, Mother    Sibling  Still living? No  Family history of meningitis, Age at diagnosis: Age Unknown    
89

## 2022-03-03 NOTE — ED CDU PROVIDER INITIAL DAY NOTE - PROGRESS NOTE DETAILS
Pt patient- taking Aspirin 81mg and Clopidogrel 75mg at night. Spoke with neuro, still recommending loading dose at 300mg tonight. - Ena Monzon PA-C Pt patient- takes Aspirin 81mg and Clopidogrel 75mg at night. Spoke with neuro, still recommending loading dose of Plavix 300mg tonight and to resume home dose of Plavix 75mg tomorrow. DW CDU attending Dr. Villegas.- Ena Monzon PA-C

## 2022-03-03 NOTE — ED PROVIDER NOTE - CLINICAL SUMMARY MEDICAL DECISION MAKING FREE TEXT BOX
57yo M presenting with complaints of leftward leaning, left sided weakness and dizziness starting this AM at 9:15, seen by PMD and referred to ED for evaluation. code stroke called at triage and brought directly to the scanners. pt reports receiving contrast in the past with no reaction despite allergy listed in the computer. CTs, labs, ekg and neuro recs. 55yo M presenting with complaints of leftward leaning, left sided weakness and dizziness starting this AM at 9:15, seen by PMD and referred to ED for evaluation. code stroke called at triage and brought directly to the scanners. pt reports receiving contrast in the past with no reaction despite allergy listed in the computer. CTs, labs, ekg and neuro recs.  Attending Katy Cobos: 55 yo male presenting with concern for dizziness and left sided weakness that began this am. upon arrival pt awake and alert. hemodynamically stable. taken to CT scan as code stroke. seen b yneurology. initial ct head negative. pt with reports of contrast allergy. on exam pt awake and alert moving all extremities. seen by neurology. recommends MRI. will place in cdu for neuro checks. no fevers or chills. well appearing. will continue to monitor neuro exam

## 2022-03-03 NOTE — ED PROVIDER NOTE - GASTROINTESTINAL, MLM
Stacey Maki   8/6/2018   Anticoagulation Therapy Visit    Description:  39 year old female   Provider:  Karina Khoury, RN   Department:  [facility]           INR as of 8/6/2018     Today's INR 3.06!      Anticoagulation Summary as of 8/6/2018     INR goal 2.0-3.0   Today's INR 3.06!   Full warfarin instructions 8/6: 3 mg; Otherwise 6 mg on Wed; 4 mg all other days   Next INR check 8/27/2018    Indications   Long-term (current) use of anticoagulants [Z79.01] [Z79.01]  Prothrombin gene mutation (H) [D68.52]         August 2018 Details    Sun Mon Tue Wed Thu Fri Sat        1               2               3               4                 5               6      3 mg   See details      7      4 mg         8      6 mg         9      4 mg         10      4 mg         11      4 mg           12      4 mg         13      4 mg         14      4 mg         15      6 mg         16      4 mg         17      4 mg         18      4 mg           19      4 mg         20      4 mg         21      4 mg         22      6 mg         23      4 mg         24      4 mg         25      4 mg           26      4 mg         27            28               29               30               31                 Date Details   08/06 This INR check       Date of next INR:  8/27/2018         How to take your warfarin dose     To take:  3 mg Take 1.5 of the 2 mg tablets.    To take:  4 mg Take 2 of the 2 mg tablets.    To take:  6 mg Take 3 of the 2 mg tablets.           
Abdomen soft, non-tender, no guarding.

## 2022-03-03 NOTE — ED PROVIDER NOTE - NEUROLOGICAL, MLM
Alert and oriented, no focal deficits, no motor or sensory deficits. smooth finger to nose, cranial nerves grossly intact

## 2022-03-03 NOTE — ED ADULT NURSE NOTE - OBJECTIVE STATEMENT
Ambulatory, well-appearing patient in no apparent distress complains of difficulty walking.  Pt unclear historian Ambulatory, well-appearing patient in no apparent distress complains of difficulty walking.  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  Pt initially went to his PMD who instructed him to come to ER.  Pt arrives conversive, walking without difficulty, making phone calls during exam. Ambulatory, well-appearing patient in no apparent distress complains of difficulty walking.  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  Pt initially went to his PMD who instructed him to come to ER.  Pt arrives conversive, walking without difficulty, making phone calls during exam.  No confusion, no slurred speech, no vision changes, no facial asymmetry, no arm drift, equal strength in all four extremities, no numbness or tingling.  Abdomen soft/non-distended/non-tender, skin warm, dry, intact denies chest pain, shortness of breath, cough, abdominal pain, nausea, vomiting, diarrhea, fevers, chills, urinary symptoms, falls.

## 2022-03-03 NOTE — ED PROVIDER NOTE - ATTENDING CONTRIBUTION TO CARE
Attending MD Katy Cobos:  I personally have seen and examined this patient.  Resident note reviewed and agree on plan of care and except where noted.  See HPI, PE, and MDM for details.

## 2022-03-03 NOTE — STROKE CODE NOTE - DISPOSITION
CDU for MRI and remainder of stroke work up as documented in recommendations of final consult note. CDU for MRI brain w/o contrast and remainder of stroke work up as documented in recommendations of final consult note.

## 2022-03-03 NOTE — ED CDU PROVIDER INITIAL DAY NOTE - MEDICAL DECISION MAKING DETAILS
Attending Katy Cobos: 57 yo male presenting with reports of left sided weakness. ct head negative for acute patholoyg. placed in cdu for neuro checks and mri

## 2022-03-03 NOTE — ED ADULT NURSE REASSESSMENT NOTE - COMFORT CARE
Pt encouraged to use urinal at bedside./darkened lights/meal provided/plan of care explained/po fluids offered/repositioned/warm blanket provided

## 2022-03-03 NOTE — ED PROVIDER NOTE - OBJECTIVE STATEMENT
55yo M with Hx of HTN, CAD, ICH (2008) presenting with complaints 55yo M with Hx of HTN, CAD, ICH (2008) presenting with complaints of dizziness and weakness. woke this AM at 6:30 feeling entirely normal, at 9:15 states that he felt himself leaning towards the left with left sided upper and lower extremity weakness with dizziness. went to his primary care doctors office who then referred him to the ED for evaluation. pt denies any f/c, falls, change in vision, slurred speech, nausea, vomiting.

## 2022-03-03 NOTE — CONSULT NOTE ADULT - SUBJECTIVE AND OBJECTIVE BOX
Neurology Consult    LD VALENCIA  56y Male  MRN-[id number]      HPI:  6 y.o. RH M with a h/o       A 10-system ROS was performed and is negative except as noted above and/or in the HPI.      PAST MEDICAL & SURGICAL HISTORY:  HTN (hypertension)    CAD (coronary artery disease)  2009; stent    Intracranial hemorrhage  2008    Respiratory arrest  december 1st    Myocardial infarction, unspecified MI type, unspecified artery    History of coronary artery stent placement      FAMILY HISTORY:  Family history of meningitis (Sibling)  Brother, death at age 49 from complications of infection (June 2015)    Family history of hypertension in mother  Mother      As stated in HPI, unless otherwise noted below.       SOCIAL HISTORY:   No history of alcohol use.  Occasionally smokes a cigar.      MEDICATIONS    Home Medications:  Crestor 20 mg oral tablet: 1 tab(s) orally once a day (at bedtime) (02 Oct 2018 19:17)  Farxiga 10 mg oral tablet: 1 tab(s) orally once a day (02 Oct 2018 19:17)  hydroCHLOROthiazide 25 mg oral tablet: 1 tab(s) orally once a day (in the morning) (02 Oct 2018 19:17)  losartan 25 mg oral tablet: 1 tab(s) orally once a day (05 Oct 2018 12:46)  montelukast 10 mg oral tablet: 1 tab(s) orally once a day (02 Oct 2018 19:17)  Symbicort 160 mcg-4.5 mcg/inh inhalation aerosol: 2 puff(s) inhaled 2 times a day (02 Oct 2018 19:17)    MEDICATIONS  (STANDING):  sodium chloride 0.9% Bolus 1000 milliLiter(s) IV Bolus once    MEDICATIONS  (PRN):      Allergies    contrast dye (Short breath)  No Known Drug Allergies  shellfish (Angioedema)    Intolerances        VITALS & EXAMINATION    Height (cm): 175.3 (03-03 @ 11:26)  Weight (kg): 89.4 (03-03 @ 11:26)  BMI (kg/m2): 29.1 (03-03 @ 11:26)    Vital Signs Last 24 Hrs  T(C): 36.7 (03 Mar 2022 11:26), Max: 36.7 (03 Mar 2022 11:26)  T(F): 98 (03 Mar 2022 11:26), Max: 98 (03 Mar 2022 11:26)  HR: 77 (03 Mar 2022 12:10) (77 - 82)  BP: 142/81 (03 Mar 2022 12:10) (142/81 - 172/111)  RR: 18 (03 Mar 2022 12:10) (18 - 18)  SpO2: 98% (03 Mar 2022 12:10) (96% - 98%)        LABS:    CBC                       15.8   9.02  )-----------( 255      ( 03 Mar 2022 11:43 )             47.3     Chem 03-03    135  |  96  |  20  ----------------------------<  221<H>  3.9   |  27  |  1.00    Ca    10.2      03 Mar 2022 11:43    TPro  7.9  /  Alb  4.5  /  TBili  0.4  /  DBili  x   /  AST  21  /  ALT  25  /  AlkPhos  68  03-03    Coags PT/INR - ( 03 Mar 2022 11:43 )   PT: 11.5 sec;   INR: 0.99 ratio      PTT - ( 03 Mar 2022 11:43 )  PTT:28.7 sec  LFTs LIVER FUNCTIONS - ( 03 Mar 2022 11:43 )  Alb: 4.5 g/dL / Pro: 7.9 g/dL / ALK PHOS: 68 U/L / ALT: 25 U/L / AST: 21 U/L / GGT: x           Lipid Panel   A1c   Cardiac enzymes     U/A     STUDIES & IMAGING     Neurology Consult    LD VALENCIA  56y Male  MRN-[id number]      HPI:  56 y.o. 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 LKN 09:00 this AM. Patient reports he presented to his PCP this morning after onset of the sensation, and was subsequently advised to present to the ED. He does not report any associated headache, blurry or double vision, dysarthria, numbness or tingling. No recent fever, chills, nausea or vomiting, upper respiratory symptoms, abdominal pain, diarrhea. He does report an incident at work approximately 3 weeks prior during which he tripped over a wooden box and hurt both knees, but did not sustain any head trauma. 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     NIHSS: 0  Baseline mRS: 0  Not a tPA candidate due to prior ICH.  Not a candidate for MT due to no LVO on imaging.      A 10-system ROS was performed and is negative except as noted above and/or in the HPI.      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  No history of alcohol use.  Occasionally smokes a cigar.      MEDICATIONS    Home Medications:  Crestor 20 mg oral tablet: 1 tab(s) orally once a day (at bedtime) (02 Oct 2018 19:17)  Farxiga 10 mg oral tablet: 1 tab(s) orally once a day (02 Oct 2018 19:17)  hydroCHLOROthiazide 25 mg oral tablet: 1 tab(s) orally once a day (in the morning) (02 Oct 2018 19:17)  losartan 25 mg oral tablet: 1 tab(s) orally once a day (05 Oct 2018 12:46)  montelukast 10 mg oral tablet: 1 tab(s) orally once a day (02 Oct 2018 19:17)  Symbicort 160 mcg-4.5 mcg/inh inhalation aerosol: 2 puff(s) inhaled 2 times a day (02 Oct 2018 19:17)    MEDICATIONS  (STANDING):  sodium chloride 0.9% Bolus 1000 milliLiter(s) IV Bolus once    MEDICATIONS  (PRN):      Allergies    contrast dye (Short breath)  No Known Drug Allergies  shellfish (Angioedema)        VITALS & EXAMINATION    Height (cm): 175.3 (03-03 @ 11:26)  Weight (kg): 89.4 (03-03 @ 11:26)  BMI (kg/m2): 29.1 (03-03 @ 11:26)    Vital Signs Last 24 Hrs  T(C): 36.7 (03 Mar 2022 11:26), Max: 36.7 (03 Mar 2022 11:26)  T(F): 98 (03 Mar 2022 11:26), Max: 98 (03 Mar 2022 11:26)  HR: 77 (03 Mar 2022 12:10) (77 - 82)  BP: 142/81 (03 Mar 2022 12:10) (142/81 - 172/111)  RR: 18 (03 Mar 2022 12:10) (18 - 18)  SpO2: 98% (03 Mar 2022 12:10) (96% - 98%)    General:  Constitutional: M, appears stated age, in no apparent distress including pain.  Head: Normocephalic & atraumatic.  Respiratory: Breathing comfortably on room air.  Cardiovascular: Mild LE edema. Pulses palpated b/l. Skin warm to touch.       Neurological (>12):  MS: Eyes open, alert, oriented to person, place, situation, time. Recent and remote memory intact. Follows all commands.    Language: Speech is clear, fluent with good repetition & comprehension. Naming intact.     CNs: PERRL (3mm OU). CVF intact, EOMI, no nystagmus, no diplopia. Facial sensation intact to LT/pinprick throughout, well developed masseter muscles b/l. Facial movements normal and symmetric, full eye closure strength b/l. Hearing grossly normal (rubbing fingers) b/l. Symmetric palate elevation in midline. Head turning & shoulder shrug intact b/l. Tongue midline, normal movements.      LABS:    CBC                       15.8   9.02  )-----------( 255      ( 03 Mar 2022 11:43 )             47.3     Chem 03-03    135  |  96  |  20  ----------------------------<  221<H>  3.9   |  27  |  1.00    Ca    10.2      03 Mar 2022 11:43    TPro  7.9  /  Alb  4.5  /  TBili  0.4  /  DBili  x   /  AST  21  /  ALT  25  /  AlkPhos  68  03-03    Coags PT/INR - ( 03 Mar 2022 11:43 )   PT: 11.5 sec;   INR: 0.99 ratio      PTT - ( 03 Mar 2022 11:43 )  PTT:28.7 sec  LFTs LIVER FUNCTIONS - ( 03 Mar 2022 11:43 )  Alb: 4.5 g/dL / Pro: 7.9 g/dL / ALK PHOS: 68 U/L / ALT: 25 U/L / AST: 21 U/L / GGT: x           Lipid Panel   A1c   Cardiac enzymes     U/A     STUDIES & IMAGING     Neurology Consult    LD VALENCIA  56y Male  MRN-[id number]      HPI:  56 y.o. 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 LKN 09:00 this AM. Patient reports he presented to his PCP this morning after onset of the sensation, and was subsequently advised to present to the ED. He does not report any associated headache, blurry or double vision, dysarthria, numbness or tingling. No recent fever, chills, nausea or vomiting, upper respiratory symptoms, abdominal pain, diarrhea. He does report an incident at work approximately 3 weeks prior during which he tripped over a wooden box and hurt both knees, but did not sustain any head trauma. 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     NIHSS: 0  Baseline mRS: 0  Not a tPA candidate due to prior ICH.  Not a candidate for MT due to no LVO on imaging.      A 10-system ROS was performed and is negative except as noted above and/or in the HPI.      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  No history of alcohol use.  Occasionally smokes a cigar.      MEDICATIONS    Home Medications:  Crestor 20 mg oral tablet: 1 tab(s) orally once a day (at bedtime) (02 Oct 2018 19:17)  Farxiga 10 mg oral tablet: 1 tab(s) orally once a day (02 Oct 2018 19:17)  hydroCHLOROthiazide 25 mg oral tablet: 1 tab(s) orally once a day (in the morning) (02 Oct 2018 19:17)  losartan 25 mg oral tablet: 1 tab(s) orally once a day (05 Oct 2018 12:46)  montelukast 10 mg oral tablet: 1 tab(s) orally once a day (02 Oct 2018 19:17)  Symbicort 160 mcg-4.5 mcg/inh inhalation aerosol: 2 puff(s) inhaled 2 times a day (02 Oct 2018 19:17)    MEDICATIONS  (STANDING):  sodium chloride 0.9% Bolus 1000 milliLiter(s) IV Bolus once    MEDICATIONS  (PRN):      Allergies    contrast dye (Short breath)  No Known Drug Allergies  shellfish (Angioedema)        VITALS & EXAMINATION    Height (cm): 175.3 (03-03 @ 11:26)  Weight (kg): 89.4 (03-03 @ 11:26)  BMI (kg/m2): 29.1 (03-03 @ 11:26)    Vital Signs Last 24 Hrs  T(C): 36.7 (03 Mar 2022 11:26), Max: 36.7 (03 Mar 2022 11:26)  T(F): 98 (03 Mar 2022 11:26), Max: 98 (03 Mar 2022 11:26)  HR: 77 (03 Mar 2022 12:10) (77 - 82)  BP: 142/81 (03 Mar 2022 12:10) (142/81 - 172/111)  RR: 18 (03 Mar 2022 12:10) (18 - 18)  SpO2: 98% (03 Mar 2022 12:10) (96% - 98%)    General:  Constitutional: M, appears stated age, in no apparent distress including pain.  Head: Normocephalic & atraumatic.  Respiratory: Breathing comfortably on room air.  Cardiovascular: Mild LE edema. Pulses palpated b/l. Skin warm to touch.     Neurological (>12):  MS: Eyes open, alert, oriented to person, place, situation, time. Recent and remote memory intact. Follows all commands.    Language: Speech is clear, fluent with good repetition & comprehension. Naming intact.     CNs: PERRL (3mm OU). EOMI, no nystagmus, no diplopia. Facial sensation intact to LT throughout, well developed masseter muscles b/l. Facial movements normal and symmetric, full eye closure strength b/l. Hearing grossly normal (rubbing fingers) b/l. Symmetric palate elevation in midline. Head turning & shoulder shrug intact b/l. Tongue midline, normal movements.    Motor: Normal muscle bulk & tone. No noticeable tremor at rest. No pronator drift. FFM intact.              Deltoid	Biceps	Triceps	Wrist	   R	5	5	5	5	5 	  L	5	5	5	5	5    	H-Flex	H-ABd	H-ADd	K-Flex	K-Ext	D-Flex	P-Flex  R	5	5	5	5	5	5	5 	   L	5	5	5	5	5	5	5	     Sensation: Intact to LT b/l throughout. Normal Romberg.     Cortical: Extinction on DSS (neglect): none.    Reflexes: 1+ biceps, brachioradialis, triceps, patellar and achilles b/l. Plantar response flexor b/l. No clonus.     Coordination: No dysmetria to FTN/HTS b/l.    Gait:       LABS:    CBC                       15.8   9.02  )-----------( 255      ( 03 Mar 2022 11:43 )             47.3     Chem 03-03    135  |  96  |  20  ----------------------------<  221<H>  3.9   |  27  |  1.00    Ca    10.2      03 Mar 2022 11:43    TPro  7.9  /  Alb  4.5  /  TBili  0.4  /  DBili  x   /  AST  21  /  ALT  25  /  AlkPhos  68  03-03    Coags PT/INR - ( 03 Mar 2022 11:43 )   PT: 11.5 sec;   INR: 0.99 ratio      PTT - ( 03 Mar 2022 11:43 )  PTT:28.7 sec  LFTs LIVER FUNCTIONS - ( 03 Mar 2022 11:43 )  Alb: 4.5 g/dL / Pro: 7.9 g/dL / ALK PHOS: 68 U/L / ALT: 25 U/L / AST: 21 U/L / GGT: x           Lipid Panel   A1c   Cardiac enzymes     U/A     STUDIES & IMAGING    CTH w/o contrast (3/3)        CTA H/N w/ contrast (3/3)       Neurology Consult    LD VALENCIA  56y Male  MRN-[id number]      HPI:  56 y.o. 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 LKN 09:00 this AM. Patient reports he presented to his PCP this morning after onset of the sensation, further described as feeling as though his body was "veering to the left", and was subsequently advised to present to the ED. He does not report any associated headache, blurry or double vision, dysarthria, numbness or tingling. No recent fever, chills, nausea or vomiting, upper respiratory symptoms, abdominal pain, diarrhea. He does report an incident at work approximately 3 weeks prior during which he tripped over a wooden box and hurt both knees, but did not sustain any head trauma. He takes aspirin 81mg at home, but admits he doesn't always take it every day. Reports he occasionally smokes cigars and has no prior history of alcohol use. 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     NIHSS: 0  Baseline mRS: 0  Not a tPA candidate due to prior ICH.  Not a candidate for MT due to no LVO on imaging.      A 10-system ROS was performed and is negative except as noted above and/or in the HPI.      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  No history of alcohol use.  Occasionally smokes a cigar.      MEDICATIONS    Home Medications:  Crestor 20 mg oral tablet: 1 tab(s) orally once a day (at bedtime) (02 Oct 2018 19:17)  Farxiga 10 mg oral tablet: 1 tab(s) orally once a day (02 Oct 2018 19:17)  hydroCHLOROthiazide 25 mg oral tablet: 1 tab(s) orally once a day (in the morning) (02 Oct 2018 19:17)  losartan 25 mg oral tablet: 1 tab(s) orally once a day (05 Oct 2018 12:46)  montelukast 10 mg oral tablet: 1 tab(s) orally once a day (02 Oct 2018 19:17)  Symbicort 160 mcg-4.5 mcg/inh inhalation aerosol: 2 puff(s) inhaled 2 times a day (02 Oct 2018 19:17)    MEDICATIONS  (STANDING):  sodium chloride 0.9% Bolus 1000 milliLiter(s) IV Bolus once    MEDICATIONS  (PRN):      Allergies    contrast dye (Short breath)  No Known Drug Allergies  shellfish (Angioedema)        VITALS & EXAMINATION    Height (cm): 175.3 (03-03 @ 11:26)  Weight (kg): 89.4 (03-03 @ 11:26)  BMI (kg/m2): 29.1 (03-03 @ 11:26)    Vital Signs Last 24 Hrs  T(C): 36.7 (03 Mar 2022 11:26), Max: 36.7 (03 Mar 2022 11:26)  T(F): 98 (03 Mar 2022 11:26), Max: 98 (03 Mar 2022 11:26)  HR: 77 (03 Mar 2022 12:10) (77 - 82)  BP: 142/81 (03 Mar 2022 12:10) (142/81 - 172/111)  RR: 18 (03 Mar 2022 12:10) (18 - 18)  SpO2: 98% (03 Mar 2022 12:10) (96% - 98%)    General:  Constitutional: M, appears stated age, in no apparent distress including pain.  Head: Normocephalic & atraumatic.  Respiratory: Breathing comfortably on room air.  Cardiovascular: Mild LE edema. Pulses palpated b/l. Skin warm to touch.     Neurological (>12):  MS: Eyes open, alert, oriented to person, place, situation, time. Recent and remote memory intact. Follows all commands.    Language: Speech is clear, fluent with good repetition & comprehension. Naming intact.     CNs: PERRL (3mm OU). EOMI, no nystagmus, no diplopia. Partial deficit noted in the right superior quadrant of the visual field OD, reported to be chronic and improved from prior VF deficit. Facial sensation intact to LT throughout, well developed masseter muscles b/l. Facial movements normal and symmetric, full eye closure strength b/l. Hearing grossly normal (rubbing fingers) b/l. Symmetric palate elevation in midline. Head turning & shoulder shrug intact b/l. Tongue midline, normal movements.    Motor: Normal muscle bulk & tone. No noticeable tremor at rest. No pronator drift. FFM intact.              Deltoid	Biceps	Triceps	Wrist	   R	5	5	5	5	5 	  L	5	5	5	5	5    	H-Flex	H-ABd	H-ADd	K-Flex	K-Ext	D-Flex	P-Flex  R	5	5	5	5	5	5	5 	   L	5	5	5	5	5	5	5	     Sensation: Intact to LT b/l throughout. Slight lean of the UE to the left (also sensed by patient) on Romberg assessment, without swaying.     Cortical: Extinction on DSS (neglect): none.    Reflexes: 1+ biceps, brachioradialis, triceps, patellar and achilles b/l. Plantar response flexor b/l. No clonus.     Coordination: No dysmetria to FTN/HTS b/l.    Gait: Slightly widened base with a slow but ultimately leftward trajectory despite attempting to walk in a straight line.       LABS:    CBC                       15.8   9.02  )-----------( 255      ( 03 Mar 2022 11:43 )             47.3     Chem 03-03    135  |  96  |  20  ----------------------------<  221<H>  3.9   |  27  |  1.00    Ca    10.2      03 Mar 2022 11:43    TPro  7.9  /  Alb  4.5  /  TBili  0.4  /  DBili  x   /  AST  21  /  ALT  25  /  AlkPhos  68  03-03    Coags PT/INR - ( 03 Mar 2022 11:43 )   PT: 11.5 sec;   INR: 0.99 ratio      PTT - ( 03 Mar 2022 11:43 )  PTT:28.7 sec  LFTs LIVER FUNCTIONS - ( 03 Mar 2022 11:43 )  Alb: 4.5 g/dL / Pro: 7.9 g/dL / ALK PHOS: 68 U/L / ALT: 25 U/L / AST: 21 U/L / GGT: x           Lipid Panel   A1c   Cardiac enzymes     U/A     STUDIES & IMAGING    CTH w/o contrast (3/3)        CTA H/N w/ contrast (3/3)       Neurology Consult    LD VALENCIA  56y Male  MRN-[id number]      HPI:  56 y.o. 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 LKN 09:00 this AM. Patient reports he presented to his PCP this morning after onset of the sensation, further described as feeling as though his body was "veering to the left", and was subsequently advised to present to the ED. He does not report any associated headache, blurry or double vision, dysarthria, numbness or tingling. No recent fever, chills, nausea or vomiting, upper respiratory symptoms, abdominal pain, diarrhea. He does report an incident at work approximately 3 weeks prior during which he tripped over a wooden box and hurt both knees, but did not sustain any head trauma. He takes aspirin 81mg at home, but admits he doesn't always take it every day. Reports he occasionally smokes cigars and has no prior history of alcohol use. 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     NIHSS: 0  Baseline mRS: 0  Not a tPA candidate due to prior ICH.  Not a candidate for MT due to no LVO on imaging.      A 10-system ROS was performed and is negative except as noted above and/or in the HPI.      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  No history of alcohol use.  Occasionally smokes a cigar.      MEDICATIONS    Home Medications:  Crestor 20 mg oral tablet: 1 tab(s) orally once a day (at bedtime) (02 Oct 2018 19:17)  Farxiga 10 mg oral tablet: 1 tab(s) orally once a day (02 Oct 2018 19:17)  hydroCHLOROthiazide 25 mg oral tablet: 1 tab(s) orally once a day (in the morning) (02 Oct 2018 19:17)  losartan 25 mg oral tablet: 1 tab(s) orally once a day (05 Oct 2018 12:46)  montelukast 10 mg oral tablet: 1 tab(s) orally once a day (02 Oct 2018 19:17)  Symbicort 160 mcg-4.5 mcg/inh inhalation aerosol: 2 puff(s) inhaled 2 times a day (02 Oct 2018 19:17)    MEDICATIONS  (STANDING):  sodium chloride 0.9% Bolus 1000 milliLiter(s) IV Bolus once    MEDICATIONS  (PRN):      Allergies    contrast dye (Short breath)  No Known Drug Allergies  shellfish (Angioedema)        VITALS & EXAMINATION    Height (cm): 175.3 (03-03 @ 11:26)  Weight (kg): 89.4 (03-03 @ 11:26)  BMI (kg/m2): 29.1 (03-03 @ 11:26)    Vital Signs Last 24 Hrs  T(C): 36.7 (03 Mar 2022 11:26), Max: 36.7 (03 Mar 2022 11:26)  T(F): 98 (03 Mar 2022 11:26), Max: 98 (03 Mar 2022 11:26)  HR: 77 (03 Mar 2022 12:10) (77 - 82)  BP: 142/81 (03 Mar 2022 12:10) (142/81 - 172/111)  RR: 18 (03 Mar 2022 12:10) (18 - 18)  SpO2: 98% (03 Mar 2022 12:10) (96% - 98%)    General:  Constitutional: M, appears stated age, in no apparent distress including pain.  Head: Normocephalic & atraumatic.  Respiratory: Breathing comfortably on room air.  Cardiovascular: Mild LE edema. Pulses palpated b/l. Skin warm to touch.     Neurological (>12):  MS: Eyes open, alert, oriented to person, place, situation, time. Recent and remote memory intact. Follows all commands.    Language: Speech is clear, fluent with good repetition & comprehension. Naming intact.     CNs: PERRL (3mm OU). EOMI, no nystagmus, no diplopia. Partial deficit noted in the right superior quadrant of the visual field OD, reported to be chronic and improved from prior VF deficit. Facial sensation intact to LT throughout, well developed masseter muscles b/l. Facial movements normal and symmetric, full eye closure strength b/l. Hearing grossly normal (rubbing fingers) b/l. Symmetric palate elevation in midline. Head turning & shoulder shrug intact b/l. Tongue midline, normal movements.    Motor: Normal muscle bulk & tone. No noticeable tremor at rest. No pronator drift. FFM intact.              Deltoid	Biceps	Triceps	Wrist	   R	5	5	5	5	5 	  L	5	5	5	5	5    	H-Flex	H-ABd	H-ADd	K-Flex	K-Ext	D-Flex	P-Flex  R	5	5	5	5	5	5	5 	   L	5	5	5	5	5	5	5	     Sensation: Intact to LT b/l throughout. Slight lean of the UE to the left (also sensed by patient) on Romberg assessment, without swaying.     Cortical: Extinction on DSS (neglect): none.    Reflexes: 1+ biceps, brachioradialis, triceps, patellar and achilles b/l. Plantar response flexor b/l. No clonus.     Coordination: No dysmetria to FTN/HTS b/l.    Gait: Slightly widened base with a slow but ultimately leftward trajectory despite attempting to walk in a straight line.       LABS:    CBC                       15.8   9.02  )-----------( 255      ( 03 Mar 2022 11:43 )             47.3     Chem 03-03    135  |  96  |  20  ----------------------------<  221<H>  3.9   |  27  |  1.00    Ca    10.2      03 Mar 2022 11:43    TPro  7.9  /  Alb  4.5  /  TBili  0.4  /  DBili  x   /  AST  21  /  ALT  25  /  AlkPhos  68  03-03    Coags PT/INR - ( 03 Mar 2022 11:43 )   PT: 11.5 sec;   INR: 0.99 ratio      PTT - ( 03 Mar 2022 11:43 )  PTT:28.7 sec  LFTs LIVER FUNCTIONS - ( 03 Mar 2022 11:43 )  Alb: 4.5 g/dL / Pro: 7.9 g/dL / ALK PHOS: 68 U/L / ALT: 25 U/L / AST: 21 U/L / GGT: x           Lipid Panel   A1c   Cardiac enzymes     U/A     STUDIES & IMAGING    CTH w/o contrast (3/3)    No hydrocephalus, mass effect, midline shift, acute intracranial hemorrhage, or brain edema.  Chronic right centrum semiovale/corona radiata, right mesial thalamic, and right posterior cerebellar hemisphere infarcts.  Visualized paranasal sinuses and mastoid air cells are clear.    CTA H/N w/ contrast (3/3)    Normal flow-related enhancement of the skull base/intracranial internal carotid arteries.  Normal flow-related enhancement of the bilateral anterior, middle, and posterior cerebral arteries.  Anterior to indicating artery is visualized.  Normal flow-related enhancement of the bilateral intradural vertebral arteries and the basilar artery.  No flow-limiting stenosis or vascular aneurysm. No AVM.    CTA neck:    Normal flow-related enhancement of the bilateral common and internal carotid arteries. Medialization of the distal bilateral common and proximal bilateral internal carotid arteries.  Normal flow-related enhancement of the bilateral vertebral arteries.  No flow-limiting stenosis, evidence for arterial dissection, or vascular aneurysm.    Multiple mildly enlarged deep cervical chain lymph nodes, for instance:  Right level 4A lymph node measures 1.1 x 1.5 cm (6-230).  More inferior right level 4A lymph node measures 1.0 x 1.4 cm (6-252).  Left level 2A lymph node measures 1.1 x 1.5 cm (6-262).  Right level 2A lymph node measures 0.9 x 1.3 cm (6-278).  Visualized lung apices clear.    IMPRESSION:    CT brain:  No hydrocephalus, acute intracranial hemorrhage, mass effect, or brain edema.  Chronic right centrum semiovale/corona radiata, right mesial thalamic, and right posterior cerebellar hemisphere infarcts..    CT brain perfusion:  Cerebral blood flow less than 30% = 0 mL. Therefore, no core infarct is predicted.    Tmax greater than 6 seconds = 0 mL. Therefore, no brain parenchyma is predicted to be at continued ischemic risk in the presence of neurologic symptoms    CTA brain:  No flow-limiting stenosis or vascular aneurysm. No AVM.    CTA neck:  No flow-limiting stenosis, evidence for arterial dissection, or vascular aneurysm.  Minimally enlarged cervical chain lymph nodes as described. Correlation is recommended   Neurology Consult    LD VALENCIA  56y Male  MRN-[id number]      HPI:  56 y.o. 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 LKN 09:00 this AM. Patient reports he presented to his PCP this morning after onset of feeling as though his body was "veering to the left", and was subsequently advised to present to the ED. He does not report any associated headache, blurry or double vision, dysarthria, numbness or tingling. No recent fever, chills, nausea or vomiting, upper respiratory symptoms, abdominal pain, diarrhea. He does report an incident at work approximately 3 weeks prior during which he tripped over a wooden box and hurt both knees, but did not sustain any head trauma. He takes aspirin 81mg at home, but admits he doesn't always take it every day. Reports he occasionally smokes cigars and has no prior history of alcohol use. 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     NIHSS: 0  Baseline mRS: 0  Not a tPA candidate due to prior ICH.  Not a candidate for MT due to no LVO on imaging.      A 10-system ROS was performed and is negative except as noted above and/or in the HPI.      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  No history of alcohol use.  Occasionally smokes a cigar.      MEDICATIONS    Home Medications:  Crestor 20 mg oral tablet: 1 tab(s) orally once a day (at bedtime) (02 Oct 2018 19:17)  Farxiga 10 mg oral tablet: 1 tab(s) orally once a day (02 Oct 2018 19:17)  hydroCHLOROthiazide 25 mg oral tablet: 1 tab(s) orally once a day (in the morning) (02 Oct 2018 19:17)  losartan 25 mg oral tablet: 1 tab(s) orally once a day (05 Oct 2018 12:46)  montelukast 10 mg oral tablet: 1 tab(s) orally once a day (02 Oct 2018 19:17)  Symbicort 160 mcg-4.5 mcg/inh inhalation aerosol: 2 puff(s) inhaled 2 times a day (02 Oct 2018 19:17)    MEDICATIONS  (STANDING):  sodium chloride 0.9% Bolus 1000 milliLiter(s) IV Bolus once    MEDICATIONS  (PRN):      Allergies    contrast dye (Short breath)  No Known Drug Allergies  shellfish (Angioedema)        VITALS & EXAMINATION    Height (cm): 175.3 (03-03 @ 11:26)  Weight (kg): 89.4 (03-03 @ 11:26)  BMI (kg/m2): 29.1 (03-03 @ 11:26)    Vital Signs Last 24 Hrs  T(C): 36.7 (03 Mar 2022 11:26), Max: 36.7 (03 Mar 2022 11:26)  T(F): 98 (03 Mar 2022 11:26), Max: 98 (03 Mar 2022 11:26)  HR: 77 (03 Mar 2022 12:10) (77 - 82)  BP: 142/81 (03 Mar 2022 12:10) (142/81 - 172/111)  RR: 18 (03 Mar 2022 12:10) (18 - 18)  SpO2: 98% (03 Mar 2022 12:10) (96% - 98%)    General:  Constitutional: M, appears stated age, in no apparent distress including pain.  Head: Normocephalic & atraumatic.  Respiratory: Breathing comfortably on room air.  Cardiovascular: Mild LE edema. Pulses palpated b/l. Skin warm to touch.     Neurological (>12):  MS: Eyes open, alert, oriented to person, place, situation, time. Recent and remote memory intact. Follows all commands.    Language: Speech is clear, fluent with good repetition & comprehension. Naming intact.     CNs: PERRL (3mm OU). EOMI, no nystagmus, no diplopia. Partial deficit noted in the right superior quadrant of the visual field OD, reported to be chronic and improved from prior VF deficit. Facial sensation intact to LT throughout, well developed masseter muscles b/l. Facial movements normal and symmetric, full eye closure strength b/l. Hearing grossly normal (rubbing fingers) b/l. Symmetric palate elevation in midline. Head turning & shoulder shrug intact b/l. Tongue midline, normal movements.    Motor: Normal muscle bulk & tone. No noticeable tremor at rest. No pronator drift. FFM intact.              Deltoid	Biceps	Triceps	Wrist	   R	5	5	5	5	5 	  L	5	5	5	5	5    	H-Flex	H-ABd	H-ADd	K-Flex	K-Ext	D-Flex	P-Flex  R	5	5	5	5	5	5	5 	   L	5	5	5	5	5	5	5	     Sensation: Intact to LT b/l throughout. Slight lean of the UE to the left (also sensed by patient) on Romberg assessment, without swaying.     Cortical: Extinction on DSS (neglect): none.    Reflexes: 1+ biceps, brachioradialis, triceps, patellar and achilles b/l. Plantar response flexor b/l. No clonus.     Coordination: No dysmetria to FTN/HTS b/l.    Gait: Slightly widened base with a slow but ultimately leftward trajectory despite attempting to walk in a straight line.       LABS:    CBC                       15.8   9.02  )-----------( 255      ( 03 Mar 2022 11:43 )             47.3     Chem 03-03    135  |  96  |  20  ----------------------------<  221<H>  3.9   |  27  |  1.00    Ca    10.2      03 Mar 2022 11:43    TPro  7.9  /  Alb  4.5  /  TBili  0.4  /  DBili  x   /  AST  21  /  ALT  25  /  AlkPhos  68  03-03    Coags PT/INR - ( 03 Mar 2022 11:43 )   PT: 11.5 sec;   INR: 0.99 ratio      PTT - ( 03 Mar 2022 11:43 )  PTT:28.7 sec  LFTs LIVER FUNCTIONS - ( 03 Mar 2022 11:43 )  Alb: 4.5 g/dL / Pro: 7.9 g/dL / ALK PHOS: 68 U/L / ALT: 25 U/L / AST: 21 U/L / GGT: x           Lipid Panel   A1c   Cardiac enzymes     U/A     STUDIES & IMAGING    CTH w/o contrast (3/3)    No hydrocephalus, mass effect, midline shift, acute intracranial hemorrhage, or brain edema.  Chronic right centrum semiovale/corona radiata, right mesial thalamic, and right posterior cerebellar hemisphere infarcts.  Visualized paranasal sinuses and mastoid air cells are clear.    CTA H/N w/ contrast (3/3)    Normal flow-related enhancement of the skull base/intracranial internal carotid arteries.  Normal flow-related enhancement of the bilateral anterior, middle, and posterior cerebral arteries.  Anterior to indicating artery is visualized.  Normal flow-related enhancement of the bilateral intradural vertebral arteries and the basilar artery.  No flow-limiting stenosis or vascular aneurysm. No AVM.    CTA neck:    Normal flow-related enhancement of the bilateral common and internal carotid arteries. Medialization of the distal bilateral common and proximal bilateral internal carotid arteries.  Normal flow-related enhancement of the bilateral vertebral arteries.  No flow-limiting stenosis, evidence for arterial dissection, or vascular aneurysm.    Multiple mildly enlarged deep cervical chain lymph nodes, for instance:  Right level 4A lymph node measures 1.1 x 1.5 cm (6-230).  More inferior right level 4A lymph node measures 1.0 x 1.4 cm (6-252).  Left level 2A lymph node measures 1.1 x 1.5 cm (6-262).  Right level 2A lymph node measures 0.9 x 1.3 cm (6-278).  Visualized lung apices clear.    IMPRESSION:    CT brain:  No hydrocephalus, acute intracranial hemorrhage, mass effect, or brain edema.  Chronic right centrum semiovale/corona radiata, right mesial thalamic, and right posterior cerebellar hemisphere infarcts..    CT brain perfusion:  Cerebral blood flow less than 30% = 0 mL. Therefore, no core infarct is predicted.    Tmax greater than 6 seconds = 0 mL. Therefore, no brain parenchyma is predicted to be at continued ischemic risk in the presence of neurologic symptoms    CTA brain:  No flow-limiting stenosis or vascular aneurysm. No AVM.    CTA neck:  No flow-limiting stenosis, evidence for arterial dissection, or vascular aneurysm.  Minimally enlarged cervical chain lymph nodes as described. Correlation is recommended

## 2022-03-03 NOTE — ED PROVIDER NOTE - PHYSICAL EXAMINATION
Attending Ktay Cobos: Gen: NAD, heent: atrauamtic, eomi, perrla, mmm, op pink, uvula midline, neck; nttp, no nuchal rigidity, chest: nttp, no crepitus, cv: rrr, no murmurs, lungs: ctab, abd: soft, nontender, nondistended, no peritoneal signs, +BS, no guarding, ext: wwp, neg homans, skin: no rash, neuro: awake and alert, following commands, speech clear, sensation and strength intact, no focal deficits, no protonator drift

## 2022-03-04 VITALS
OXYGEN SATURATION: 98 % | TEMPERATURE: 98 F | SYSTOLIC BLOOD PRESSURE: 152 MMHG | HEART RATE: 78 BPM | DIASTOLIC BLOOD PRESSURE: 88 MMHG | RESPIRATION RATE: 18 BRPM

## 2022-03-04 LAB
A1C WITH ESTIMATED AVERAGE GLUCOSE RESULT: 10.9 % — HIGH (ref 4–5.6)
CHOLEST SERPL-MCNC: 233 MG/DL — HIGH
ESTIMATED AVERAGE GLUCOSE: 266 MG/DL — HIGH (ref 68–114)
GLUCOSE BLDC GLUCOMTR-MCNC: 212 MG/DL — HIGH (ref 70–99)
HDLC SERPL-MCNC: 32 MG/DL — LOW
LIPID PNL WITH DIRECT LDL SERPL: 141 MG/DL — HIGH
NON HDL CHOLESTEROL: 201 MG/DL — HIGH
TRIGL SERPL-MCNC: 301 MG/DL — HIGH

## 2022-03-04 PROCEDURE — 82962 GLUCOSE BLOOD TEST: CPT

## 2022-03-04 PROCEDURE — G0378: CPT

## 2022-03-04 PROCEDURE — 85018 HEMOGLOBIN: CPT

## 2022-03-04 PROCEDURE — 84132 ASSAY OF SERUM POTASSIUM: CPT

## 2022-03-04 PROCEDURE — 70450 CT HEAD/BRAIN W/O DYE: CPT | Mod: MA

## 2022-03-04 PROCEDURE — 83036 HEMOGLOBIN GLYCOSYLATED A1C: CPT

## 2022-03-04 PROCEDURE — 85025 COMPLETE CBC W/AUTO DIFF WBC: CPT

## 2022-03-04 PROCEDURE — 84295 ASSAY OF SERUM SODIUM: CPT

## 2022-03-04 PROCEDURE — 85610 PROTHROMBIN TIME: CPT

## 2022-03-04 PROCEDURE — 82947 ASSAY GLUCOSE BLOOD QUANT: CPT

## 2022-03-04 PROCEDURE — 85730 THROMBOPLASTIN TIME PARTIAL: CPT

## 2022-03-04 PROCEDURE — U0003: CPT

## 2022-03-04 PROCEDURE — U0005: CPT

## 2022-03-04 PROCEDURE — 85014 HEMATOCRIT: CPT

## 2022-03-04 PROCEDURE — 70498 CT ANGIOGRAPHY NECK: CPT | Mod: MA

## 2022-03-04 PROCEDURE — 93005 ELECTROCARDIOGRAM TRACING: CPT

## 2022-03-04 PROCEDURE — 94640 AIRWAY INHALATION TREATMENT: CPT

## 2022-03-04 PROCEDURE — 80053 COMPREHEN METABOLIC PANEL: CPT

## 2022-03-04 PROCEDURE — 0042T: CPT

## 2022-03-04 PROCEDURE — 84484 ASSAY OF TROPONIN QUANT: CPT

## 2022-03-04 PROCEDURE — 70551 MRI BRAIN STEM W/O DYE: CPT | Mod: MG

## 2022-03-04 PROCEDURE — 82330 ASSAY OF CALCIUM: CPT

## 2022-03-04 PROCEDURE — 82803 BLOOD GASES ANY COMBINATION: CPT

## 2022-03-04 PROCEDURE — 99217: CPT

## 2022-03-04 PROCEDURE — 71045 X-RAY EXAM CHEST 1 VIEW: CPT

## 2022-03-04 PROCEDURE — 70496 CT ANGIOGRAPHY HEAD: CPT | Mod: MA

## 2022-03-04 PROCEDURE — 82435 ASSAY OF BLOOD CHLORIDE: CPT

## 2022-03-04 PROCEDURE — G1004: CPT

## 2022-03-04 PROCEDURE — 80061 LIPID PANEL: CPT

## 2022-03-04 PROCEDURE — 99285 EMERGENCY DEPT VISIT HI MDM: CPT | Mod: 25

## 2022-03-04 PROCEDURE — 83605 ASSAY OF LACTIC ACID: CPT

## 2022-03-04 RX ORDER — MECLIZINE HCL 12.5 MG
1 TABLET ORAL
Qty: 15 | Refills: 0
Start: 2022-03-04 | End: 2022-03-08

## 2022-03-04 RX ORDER — ATORVASTATIN CALCIUM 80 MG/1
1 TABLET, FILM COATED ORAL
Qty: 30 | Refills: 0
Start: 2022-03-04 | End: 2022-04-02

## 2022-03-04 RX ADMIN — BUDESONIDE AND FORMOTEROL FUMARATE DIHYDRATE 2 PUFF(S): 160; 4.5 AEROSOL RESPIRATORY (INHALATION) at 08:57

## 2022-03-04 NOTE — ED CDU PROVIDER SUBSEQUENT DAY NOTE - NS ED ROS FT
Constitutional: No fever or chills  Eyes: No visual changes, eye pain   CV: No chest pain or lower extremity edema  Resp: No SOB no cough  GI: No abd pain. No nausea or vomiting. No diarrhea.  : No dysuria, hematuria.   MSK: No musculoskeletal pain  Skin: No rash  Psych: No complaints   Neuro: +Dizziness +Change in gait. +Weakness No headache. No numbness or tingling.   Endo: +Pre-DM

## 2022-03-04 NOTE — ED CDU PROVIDER SUBSEQUENT DAY NOTE - ATTENDING CONTRIBUTION TO CARE
Attending MD Colindres:  I personally have seen and examined this patient.  Resident note reviewed and agree on plan of care and except where noted.

## 2022-03-04 NOTE — ED ADULT NURSE REASSESSMENT NOTE - NS ED NURSE REASSESS COMMENT FT1
After Code Stroke called on pt, he refused to go back to tx area until his wife was with him.  He was assured multiple time that his wife would be escorted back to him once she returned to waiting area.  After many instances of reassurance and the explanation that "time is brain" and that there was a team of physicians waiting for him, he finally relented and was taken to CT scan.  Wife was escorted back to tx area immediately upon her return to .
Patient alert and oriented times 3. VSS. Afebrile. Lungs clear. Abdomen soft, positive bowel sounds. C/o still feeling dizzy and "walking to the left". Denies c/o chest pain or shortness of breath. Respiratory rate unlabored without shortness of breath noted. Patient extremely restless at times. Rest periods encouraged. Positioned for optimal comfort in no apparent distress.
Pt advised to keep heart monitor plugged in due to  pt frequently disconnecting. Pt verbalized understanding.
reports worsening dizziness, standing up in room holding wall, advised that he must remain in bed and use the call bell if he needs assistance, pt adamant that he will not be staying in the hospital, states he is OK to drive his car and that he has things to do.  md marcelino and antony aware and will discuss POC with patient, CDU consult pending.
Received pt from MARU De Los Santos , received pt alert and responsive, oriented x4, denies any respiratory distress, SOB, or difficulty breathing. Pt transferred to CDU for unsteady gait. Pt states gait favors L side with ambulation. No other deficits noted at this time. Pt pending MRI in AM pt aware of plan. On telemetry pt is SR hr: 70's.  IV in place, patent and free of signs of infiltration,  pt denies chest pain or palpitations, V/S stable, pt afebrile, pt denies pain at this time. Pt educated on unit and unit rules, instructed patient to notify RN of any needed assistance, Pt verbalizes understanding, Call bell placed within reach. Safety maintained. Will continue to monitor. MRI form brought to MRI suite.

## 2022-03-04 NOTE — ED CDU PROVIDER DISPOSITION NOTE - CLINICAL COURSE
55yo M with Hx of HTN, CAD, ICH (2008) presenting with complaints of dizziness and weakness. woke this AM at 6:30 feeling entirely normal, at 9:15 states that he felt himself leaning towards the left with left sided upper and lower extremity weakness with dizziness. went to his primary care doctors office who then referred him to the ED for evaluation. pt denies any f/c, falls, change in vision, slurred speech, nausea, vomiting. In the ED VSS.  Labs unremarkable.  CT/CTA head and neck negative for acute pathology. Neuro consult recommending MRI.  Plan to place patient in the CDU overnight for tele monitoring and MRI. 57yo M with Hx of HTN, CAD, ICH (2008) presenting with complaints of dizziness and weakness. woke this AM at 6:30 feeling entirely normal, at 9:15 states that he felt himself leaning towards the left with left sided upper and lower extremity weakness with dizziness. went to his primary care doctors office who then referred him to the ED for evaluation. pt denies any f/c, falls, change in vision, slurred speech, nausea, vomiting. In the ED VSS.  Labs unremarkable.  CT/CTA head and neck negative for acute pathology. Neuro consult recommending MRI.  Plan to place patient in the CDU overnight for tele monitoring and MRI.  MRI showed No acute infarction, cerebral edema or mass effect. Chronic right thalamic and left occipital encephalomalacia with chronic hemosiderin deposition. pt was discharged home with outpt pmd, cardiology, and neurology followup.

## 2022-03-04 NOTE — ED CDU PROVIDER SUBSEQUENT DAY NOTE - PROGRESS NOTE DETAILS
pt seen by neurology attending Dr. Izquierdo. MRI showed No acute infarction, cerebral edema or mass effect. Chronic right thalamic and left occipital encephalomalacia with chronic hemosiderin deposition. cleared for discharge home with outpt pmd, cardiology, and neurology followup. discussed with Dr. Murry, pt stable for d/c home. -Beata Stafford PA-C Pt comfortable. states he is feeling better today. dizziness resolved. pt c/o new left leg tingling. Vital signs stable. Gait steady. declining meclizine at this time. states he is willing to try it as an outpatient. awaiting neurology re-evaluation and mri results. -Beata Stafford PA-C

## 2022-03-04 NOTE — ED CDU PROVIDER SUBSEQUENT DAY NOTE - HISTORY
No interval changes since initial CDU provider note. Pt feels well without complaint. NAD VSS. no events on tele. MRI results pending in the setting of dizziness/gait changes. - KARYN Monzon

## 2022-03-04 NOTE — ED CDU PROVIDER DISPOSITION NOTE - NSFOLLOWUPINSTRUCTIONS_ED_ALL_ED_FT
Hydrate.     Continue taking Clopidogrel 75mg and Aspirin 81mg daily.   Start taking Atorvastatin 80mg daily. Please do not resume your home Rosuvastatin while taking this medication.    Please follow up with your cardiologist or [facility] in order to obtain a TTE with bubble and an internal loop recorder. Call [phone] to make an appointment.    We recommend you follow up with your primary care provider within the next 2-3 days, please bring all of your results with you.     You can also follow up with Neurologist Dr. Izquierdo, [address] [facility], [place], [place]; [phone]). Call the office today to make a follow up appointment.    Please return to the Emergency Department with new, worsening, or concerning symptoms, such as:  -Dizziness, syncope   -Visual changes  -Shortness of breath or trouble breathing  -Pressure, pain, tightness in chest  -Facial drooping, arm weakness, or speech difficulty   -Head injury or loss of consciousness   -Nonstop bleeding or an open wound     *More detailed information regarding your visit and discharge can be found by reviewing this packet Stay hydrated.     Continue taking Clopidogrel 75mg and Aspirin 81mg daily.   Start taking Atorvastatin 80mg daily. Please do not resume your home Rosuvastatin while taking this medication.    Please follow up with your cardiologist or [facility] in order to obtain a TTE (echo) with bubble studdy and a loop recorder. Call [phone] to make an appointment.    We recommend you follow up with your primary care provider within the next 2-3 days, please bring all of your results with you.     You can also follow up with Neurologist Dr. Izquierdo, [address] [facility], [place], [place]; [phone]). Call the office to make a follow up appointment.    Please return to the Emergency Department with new, worsening, or concerning symptoms, such as:  -Dizziness, syncope   -Visual changes  -Shortness of breath or trouble breathing  -Pressure, pain, tightness in chest  -Facial drooping, arm weakness, or speech difficulty   -Head injury or loss of consciousness   -Nonstop bleeding or an open wound     *More detailed information regarding your visit and discharge can be found by reviewing this packet Stay hydrated.     Continue taking Clopidogrel 75mg and Aspirin 81mg daily.   Start taking Atorvastatin 80mg daily. Please do not resume your home Rosuvastatin while taking this medication.    Please follow up with your cardiologist or [facility] in order to obtain a TTE (echo) with bubble studdy and a loop recorder. Call [phone] to make an appointment.    We recommend you follow up with your primary care provider within the next 2-3 days, please bring all of your results with you.     You can also follow up with Neurologist Dr. Izquierdo in 2-3 days, [address] [facility], [place], [place]; [phone]). Call the office to make a follow up appointment.  You may need an MRI of your lumbar spine as an outpatient if the left leg tingling continues.    Please return to the Emergency Department with new, worsening, or concerning symptoms, such as:  -Dizziness, syncope   -Visual changes  -Shortness of breath or trouble breathing  -Pressure, pain, tightness in chest  -Facial drooping, arm weakness, or speech difficulty   -Head injury or loss of consciousness   -Nonstop bleeding or an open wound     *More detailed information regarding your visit and discharge can be found by reviewing this packet Stay hydrated.     Continue taking Clopidogrel 75mg and Aspirin 81mg daily.   Start taking Atorvastatin 80mg daily. Please do not resume your home Rosuvastatin while taking this medication.    Please follow up with your cardiologist or [facility] in order to obtain a TTE (echo) with bubble studdy and a loop recorder. Call [phone] to make an appointment.    We recommend you follow up with your primary care provider within the next 2-3 days, please bring all of your results with you. Follow up your elevated cholesterol and hemoglobin A1C with your doctor.    Follow up with Neurologist Dr. Izquierdo in 2-3 days, [address] [facility], [place], [place]; ([phone]). Call the office to make a follow up appointment.  You may need an MRI of your lumbar spine as an outpatient if the left leg tingling continues.    Please return to the Emergency Department with new, worsening, or concerning symptoms, such as:  -Dizziness, syncope   -Visual changes  -Shortness of breath or trouble breathing  -Pressure, pain, tightness in chest  -Facial drooping, arm weakness, or speech difficulty   -Head injury or loss of consciousness   -Nonstop bleeding or an open wound     *More detailed information regarding your visit and discharge can be found by reviewing this packet

## 2022-03-04 NOTE — ED CDU PROVIDER SUBSEQUENT DAY NOTE - PHYSICAL EXAMINATION
GEN: Well Appearing, Nontoxic, NAD  HEENT: NC/AT, Symm Facies. PERRL, EOMI  CV: No JVD/Bruits or stridor;  +S1S2, RRR w/o m/g/r  RESP: CTAB w/o w/r/r  ABD: Soft, nt/nd, +BS.  EXT/MSK: No lower extremity edema or calf tenderness. FROMx4  SKIN: No visible erythema, lesions or rash  Neuro: Grossly intact, AOX3 with normal speech, Sensation grossly intact, strength equal b/l UE/LE 5/5 throughout. Steady gait.   Psych: Appropriate mood and affect

## 2022-03-04 NOTE — ED CDU PROVIDER DISPOSITION NOTE - ATTENDING CONTRIBUTION TO CARE
Attending MD Colindres:   I personally have seen and examined this patient.  Physician assistant note reviewed and agree on plan of care and except where noted. Patient re-evaluated and doing well.  No acute issues at  this time.  Lab and radiology tests reviewed with patient and/or family.  Patient stable for discharge.

## 2022-03-04 NOTE — ED CDU PROVIDER DISPOSITION NOTE - PATIENT PORTAL LINK FT
You can access the FollowMyHealth Patient Portal offered by [facility] by registering at the following website: [email or web address]. By joining [employer]’s FollowMyHealth portal, you will also be able to view your health information using other applications (apps) compatible with our system.

## 2022-06-29 NOTE — PROGRESS NOTE ADULT - MINUTES
44
44
Rotation Flap Text: Because of the full-thickness nature of the defect and proximity to vital structures, a rotation flap was planned. After prep and local anesthesia, the flap was carefully incised along an arc.  A Burow's triangle was removed adjacent to the defect and along the outside of the arc. The flap was raised and the wound edges were undermined in the subcutaneous plane. After hemostasis, the flap was rotated into the defect. The distal tip of the flap was trimmed to fit the defect. The entirety of the flap's arcuate border was sutured in a layered fashion

## 2022-07-28 NOTE — ED PROVIDER NOTE - NS ED MD DISPO ADMITTING SERVICE
Pre-procedure INR: 1.1    As previously determined Warfarin and Lovenox can be resumed tomorrow 7/29/22    Plan: HOLD Warfarin Thursday 7/28; 10mg (4 tablets) Friday 7/29 and Saturday 7/30; 7.5mg (3 tablets) Sunday 7/31 totaling 27.5mg over the next 4 days with 1 hold; 27.5mg in 7 days with 4 holds  Lovenox 100mg every 12 hours at 6am and 6pm starting Friday 7/29/22 at 6am    Call placed to patient's spouse Roslyn. Reviewed plan as outlined above. She writes all down and repeats back correctly. She is agreeable to INR on Monday 8/01/22 and will have him go to the hospital lab so that we receive his results same day. No further questions at this time.     Timed staff message created   NEURO

## 2023-01-24 NOTE — ED ADULT NURSE NOTE - CHIEF COMPLAINT QUOTE
1/24/2023       RE: Kitty Bangura  [address] [place] [id number]     Dear Colleague,    Thank you for referring your patient, Kitty Bangura, to the [facility] [place] at [facility]. Please see a copy of my visit note below.    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  She has been followed by Dr. Torres for the lichen planus.  On his last visit with her it was noted that her mouth had been doing well but she had been trouble with swallowing and sometimes having her pills get stuck.  She also noted constant postnasal drip and voice changes.    A swallow study a videofluoroscopic swallow study was obtained on 12/9/2022.  During that exam she had a safe functional oropharyngeal swallow.  There was no aspiration or penetration noted.  There was adequate upper esophageal sphincter opening on each consistency.  The barium tablet did get hung up in the mid esophagus and was cleared with multiple sips of thin liquid.  She has noted solid food will stick in this area.  During the interview with speech-language pathology it was noted that she had issues with throat clearing and coughing that did not correlate with p.o. intake.  She also had intermittent difficulties with hoarseness.  She notes vocal fatigue and hoarseness with voice use.  The pitch and quality of her voice has changed over the last few years.  She does get anterior neck pain with anything greater than mild talking.      Patient Supplied Answers To VHI Questionnaire  Voice Handicap Index (VHI-10) 1/20/2023   My voice makes it difficult for people to hear me 2   People have difficulty understanding me in a noisy room 2   My voice difficulties restrict my personal and social life.  2   I feel left out of conversations because of my voice 3   My voice problem causes me  chest pain with pain to left underarm, SOB "to lose income 0   I feel as though I have to strain to produce voice 2   The clarity of my voice is unpredictable 3   My voice problem upsets me 2   My voice makes me feel handicapped 2   People ask, \"What's wrong with your voice?\" 3   VHI-10 21               PAST MEDICAL HISTORY:   Past Medical History:   Diagnosis Date     Arthritis Post chemo    Suspected     Diabetes (H)      History of blood transfusion      History of total splenectomy 04/17/2018     Necrotizing pancreatitis      Necrotizing pancreatitis 12/9/2017     Pancreatic cancer (H)      Pancreatic mass 11/1/2017    On CT abd/pelvis: 3 cm heterogeneous mass within the tail of the pancreas. A few calcifications are noted along the lateral margin of the mass.     Pneumonia     2016     PONV (postoperative nausea and vomiting)        PAST SURGICAL HISTORY:   Past Surgical History:   Procedure Laterality Date     ABDOMEN SURGERY  1983    Ruptured tubal pregnancies, additional surgeries followed     BACK SURGERY  2004    L5, S1 discectomy     BREAST SURGERY  2003    Breast reduction     COLONOSCOPY  2008     COLONOSCOPY N/A 12/11/2018    Procedure: colonoscopy;  Surgeon: Lobito Marte MD;  Location: [facility]     DAVINCI LOBECTOMY LUNG Left 7/28/2021    Procedure: Robot-assisted thoracoscopic left lower lobe wedge resection;  Surgeon: René Phillips MD;  [facility]     ENDOSCOPIC RETROGRADE CHOLANGIOPANCREATOGRAM N/A 1/25/2018    Procedure: COMBINED ENDOSCOPIC RETROGRADE CHOLANGIOPANCREATOGRAPHY, PLACE TUBE/STENT;  Endoscopic retrograde cholangiopancreatogram with stent placement and cyst drainage bile ;  Surgeon: Vito Sanches MD;  [facility]: [facility]     ENDOSCOPIC ULTRASOUND LOWER GASTROINTESTIONAL TRACT (GI) N/A 1/25/2018    Procedure: ENDOSCOPIC ULTRASOUND LOWER GASTROINTESTIONAL TRACT (GI);  Endoscopic Ultrasound with guided Cyst-Gastrostomy;  Surgeon: González Metz MD;  Location: [facility]     ENDOSCOPIC ULTRASOUND, ESOPHAGOSCOPY, " GASTROSCOPY, DUODENOSCOPY (EGD), NECROSECTOMY N/A 2017    Procedure: ENDOSCOPIC ULTRASOUND, ESOPHAGOSCOPY, GASTROSCOPY, DUODENOSCOPY (EGD), NECROSECTOMY;  Esophagogastroduodenoscopy, with  Necrosectomy and stents replacement  ;  Surgeon: González Metz MD;  [facility]     ENDOSCOPIC ULTRASOUND, ESOPHAGOSCOPY, GASTROSCOPY, DUODENOSCOPY (EGD), NECROSECTOMY N/A 2017    Procedure: ENDOSCOPIC ULTRASOUND, ESOPHAGOSCOPY, GASTROSCOPY, DUODENOSCOPY (EGD), NECROSECTOMY;  Esophagogastroduodenoscopy with Necrosectomy, Balloon Dilation, stent removal X3 and Cystgastrostomy stent Placement X2;  Surgeon: Guru Cecilia Staples MD;  [facility]     ESOPHAGOSCOPY, GASTROSCOPY, DUODENOSCOPY (EGD), COMBINED N/A 2017    Procedure: COMBINED ENDOSCOPIC ULTRASOUND, ESOPHAGOSCOPY, GASTROSCOPY, DUODENOSCOPY (EGD), FINE NEEDLE ASPIRATE/BIOPSY;  Endoscopic Ultrasound with cystgastrostomy and fine needle aspiration ;  Surgeon: González Metz MD;  [facility]     ESOPHAGOSCOPY, GASTROSCOPY, DUODENOSCOPY (EGD), COMBINED N/A 2018    Procedure: COMBINED ESOPHAGOSCOPY, GASTROSCOPY, DUODENOSCOPY (EGD);  EGD;  Surgeon: González Metz MD;  Location:  GI     ESOPHAGOSCOPY, GASTROSCOPY, DUODENOSCOPY (EGD), COMBINED N/A 2018    Procedure: COMBINED ESOPHAGOSCOPY, GASTROSCOPY, DUODENOSCOPY (EGD), REMOVE FOREIGN BODY;;  Surgeon: González Metz MD;  Location:  GI     GYN SURGERY  1983    Multiple ectopic pregnancies, reconnect,  1988     INSERT PORT VASCULAR ACCESS Right 2018    Procedure: INSERT PORT VASCULAR ACCESS;  Chest Port Placement - right;  Surgeon: Chanda Lawson PA-C;  [facility]     IR PORT REMOVAL RIGHT  2019     LAPAROSCOPY DIAGNOSTIC (GENERAL) N/A 2018    Procedure: LAPAROSCOPY DIAGNOSTIC (GENERAL);  Diagnostic Laparoscopy; Open Distal Pancreatectomy And Splenectomy, splenic flexure mobilization, cholecystectomy, intraoperative  ultrasound;  Surgeon: Ja Byrd MD;  [facility]: [facility]     MAMMOPLASTY REDUCTION Bilateral 2006     PANCREATECTOMY, SPLENECTOMY N/A 2018    Procedure: PANCREATECTOMY, SPLENECTOMY;;  Surgeon: Ja Byrd MD;  [facility]     ND LAMNOTMY INCL W/DCMPRSN NRV ROOT 1 INTRSPC LUMBR      Description: Hemilaminectomy With Disc Removal One Lumbar Interspace;  Recorded: 2008;  Comments: Right L5-S1 with resultant loss of right patellar and achilles reflex     REMOVE PORT VASCULAR ACCESS Right 2019    Procedure: Right Port Removal;  Surgeon: Juan J Donaldson PA-C;  Location:  OR     [facility]  DELIVERY ONLY      Description:  Section;  Recorded: 12/10/2009;       FAMILY HISTORY:   Family History   Problem Relation Age of Onset     Heart Disease Father      Hyperlipidemia Father      Heart Disease Brother      Diabetes Mother      Hypertension Mother      Obesity Mother      Depression Mother      Diabetes Maternal Grandfather      Hypertension Maternal Grandfather      Obesity Maternal Grandfather      Diabetes Sister      Hypertension Sister      Depression Sister      Anxiety Disorder Sister      Obesity Sister      Hypertension Brother      Hyperlipidemia Brother      Heart Disease Brother      Breast Cancer Cousin      Breast Cancer Cousin      Breast Cancer Cousin      Colon Cancer Other      Other Cancer Other         Mesothelioma     Depression Sister      Anxiety Disorder Brother      Cancer Brother 64        colon cancer     Anxiety Disorder Son      Depression Son      Mental Illness Sister         Schizophrenia     Hypertension Sister      Obesity Maternal Grandmother      Obesity Sister      Diabetes Nephew      Hypertension Brother        SOCIAL HISTORY:   Social History     Tobacco Use     Smoking status: Former     Packs/day: 0.50     Years: 5.00     Pack years: 2.50     Types: Cigarettes     Start date: 1974     Quit date:      Years since quittin.0      Smokeless tobacco: Never   Substance Use Topics     Alcohol use: No     Alcohol/week: 21.0 standard drinks     Comment: Now quit since Oct 31.  Moderate drinker in past       REVIEW OF SYSTEMS: Ten point review of systems was performed and is negative except for:   [facility] ROS 1/20/2023   Constitutional: -   Neurology: Dizzy spells   Psychology: -   Ears, Nose, Throat: Ringing/noise in ears, Nasal congestion or drainage, Trouble swallowing, Hoarseness   Cardiopulmonary: Cough   Gastrointestinal/Genitourinary: Heartburn/indigestion, Diarrhea   Musculoskeletal: Sore or stiff joints, Back pain   Allergy/Immunology: -   Hematologic: -   Endocrine: Thirst, Heat or cold intolerance, Frequent urination   Other: -        ALLERGIES: Patient has no known allergies.    MEDICATIONS:   Current Outpatient Medications   Medication Sig Dispense Refill     acetaminophen (TYLENOL) 500 MG tablet Take 2 tablets (1,000 mg) by mouth every 8 hours 100 tablet 1     alcohol swab prep pads Use to swab area of injection/avel as directed. 100 each 3     amylase-lipase-protease (CREON) 96972-61462 units CPEP per EC capsule TAKE 4 CAPSULES WITH MEALS AND 2 CAPSULES WITH SNACKS, UP TO 16 CAPSULES PER DAY 1440 capsule 3     blood glucose (NO BRAND SPECIFIED) test strip Use to test blood sugar 2 times daily or as directed. To accompany: Blood Glucose Monitor Brands: per insurance. 100 strip 6     blood glucose (ONETOUCH VERIO IQ) test strip Use to test blood sugar 4 times daily or as directed. 400 each 3     blood glucose calibration (NO BRAND SPECIFIED) solution To accompany: Blood Glucose Monitor Brands: per insurance. 1 each 3     blood glucose monitoring (NO BRAND SPECIFIED) meter device kit Use to test blood sugar 2 times daily or as directed. Preferred blood glucose meter OR supplies to accompany: Blood Glucose Monitor Brands: per insurance. 1 kit 0     clobetasol (TEMOVATE) 0.05 % GEL topical gel Apply topically 2 times daily 30 g 1      clobetasol (TEMOVATE) 0.05 % GEL topical gel Apply topically 2 times daily 15 g 1     gabapentin (NEURONTIN) 100 MG capsule Take 3 capsules (300 mg) by mouth 3 times daily 270 capsule 3     metFORMIN (GLUCOPHAGE XR) 500 MG 24 hr tablet Take 1 tablet (500 mg) by mouth 2 times daily (with meals) 180 tablet 3     OneTouch Delica Lancets 33G MISC 4 lancets daily 150 each 3     thin (NO BRAND SPECIFIED) lancets Use with lanceting device. To accompany: Blood Glucose Monitor Brands: per insurance. 100 each 6         PHYSICAL EXAMINATION:  She  is awake, alert and in no apparent distress.    Her tympanic membranes are clear and intact bilaterally. External auditory canals are clear.  Nasal exam shows a mild septal deviation without obstruction.  Examination of the oral cavity shows no suspicious lesions.  There is symmetric movement of the tongue and soft palate.    The oropharynx is clear.  Her neck is supple without significant adenopathy.  There is tenderness to palpation of the thyrohyoid membrane.  The thyrohyoid space is also relatively tight.  Pulse is regular.  Upper airway is clear.  Cranial nerves II-XII are grossly intact.       PROCEDURE: A flexible laryngoscopy  was performed.  Informed consent was obtained and a time out was performed. 2% lidocaine and 0.025% oxymetazoline was sprayed into the nasal cavity and allowed 3 to 5 minutes for effect. The scope was passed through the right sided nostril. Examination showed the vocal folds to be mobile and meet in the midline.  Admitted and upper frequencies there was a persistent glottic gap present that appear to be associated with greater tension of the vocal folds.  Hard closure between the arytenoids was present throughout the examination during phonation.  Mild erythema and mild edema was seen on the medial surface of both arytenoids.  The remainder of the glottic and supraglottic mucosa was quite healthy and minimally inflamed.  No nodules, polyps or  ulcerations are seen.  There is mild inflammation at the posterior commissure.   The membranous vocal folds show no inflammation. With phonation there is moderate contraction of the supraglottic larynx.  The hypopharynx is otherwise clear as is the subglottis.     1/24/2023 Exam      Videostroboscopy was performed to evaluate the mucosal wave integrity and dynamics.  A full mucosal wave was seen bilaterally.  There were no areas of mucosal wave breakdown but limited was Riis but limited restriction was seen intermittently on both vocal folds but more probably on the right, consistent with increased muscle tension.  There was fair periodicity seen bilaterally.  The mucosal wave amplitude was  inconsistent but at low pitches a full wave could be seen..  A persistent glottic gap was seen at the mid to upper range associated with a harsher quality to the voice.   . At higher pitches there was a further loss of stable vocal stability resulting in a deterioration of the mucosal wave analysis.     No additional lesions or disruptions of the mucosal wave were seen .    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  This is likely a indirect consequence of her multiple abdominal surgeries resulting in decreased breath support over the years.  I am recommending a trial of speech therapy both for cough suppression as well as improving her breath support and reducing her muscle tension dysphonia.  Video was reviewed with her and all questions were answered.  I will have her follow-up on an as-needed basis.        Again, thank you for allowing me to participate in the care of your patient.      Sincerely,    Bret Coughlin MD

## 2023-03-08 NOTE — DISCHARGE NOTE ADULT - HAS THE PATIENT RECEIVED THE INFLUENZA VACCINE DURING THIS VISIT
Goal Outcome Evaluation:  Plan of Care Reviewed With: patient        Progress: improving  Outcome Evaluation: Pt. demonstrated improved sitting and standing balance today with BADL task completion, but continues to need cues for hand placement for transfer safety.  Pt. with improved endurance being able to perform all grooming tasks sinkside standing. Good progress to all goals.  Pt. continues to demonstrated endurance, strength and balance below baseline level with impact on prior ADL independence level warranting skilled OT services.   No

## 2023-03-10 ENCOUNTER — OUTPATIENT (OUTPATIENT)
Dept: OUTPATIENT SERVICES | Facility: HOSPITAL | Age: 58
LOS: 1 days | End: 2023-03-10
Payer: COMMERCIAL

## 2023-03-10 VITALS
HEIGHT: 69 IN | SYSTOLIC BLOOD PRESSURE: 141 MMHG | DIASTOLIC BLOOD PRESSURE: 88 MMHG | HEART RATE: 81 BPM | OXYGEN SATURATION: 98 % | RESPIRATION RATE: 18 BRPM | TEMPERATURE: 99 F | WEIGHT: 195.11 LBS

## 2023-03-10 DIAGNOSIS — I25.10 ATHEROSCLEROTIC HEART DISEASE OF NATIVE CORONARY ARTERY WITHOUT ANGINA PECTORIS: ICD-10-CM

## 2023-03-10 DIAGNOSIS — J45.909 UNSPECIFIED ASTHMA, UNCOMPLICATED: ICD-10-CM

## 2023-03-10 DIAGNOSIS — N47.1 PHIMOSIS: ICD-10-CM

## 2023-03-10 DIAGNOSIS — I10 ESSENTIAL (PRIMARY) HYPERTENSION: ICD-10-CM

## 2023-03-10 DIAGNOSIS — Z95.5 PRESENCE OF CORONARY ANGIOPLASTY IMPLANT AND GRAFT: Chronic | ICD-10-CM

## 2023-03-10 DIAGNOSIS — E78.5 HYPERLIPIDEMIA, UNSPECIFIED: ICD-10-CM

## 2023-03-10 DIAGNOSIS — Z01.818 ENCOUNTER FOR OTHER PREPROCEDURAL EXAMINATION: ICD-10-CM

## 2023-03-10 DIAGNOSIS — E11.9 TYPE 2 DIABETES MELLITUS WITHOUT COMPLICATIONS: ICD-10-CM

## 2023-03-10 PROCEDURE — G0463: CPT

## 2023-03-10 RX ORDER — DAPAGLIFLOZIN 10 MG/1
1 TABLET, FILM COATED ORAL
Qty: 0 | Refills: 0 | DISCHARGE

## 2023-03-10 RX ORDER — CLOPIDOGREL BISULFATE 75 MG/1
1 TABLET, FILM COATED ORAL
Qty: 0 | Refills: 0 | DISCHARGE

## 2023-03-10 NOTE — H&P PST ADULT - ASSESSMENT
This is a 57 year old male with PMH of CAD on aspirin, MI in 2018, s/p coronary stent in 2018, HTN, HLD, asthma-last attack in 2019, respiratory failure in 2018- intubated for 20 minutes as per pt, CVA in 2008-no residual deficit, Diabetes-stopped taking Farxiga weeks ago -on Ozempic weekly-HgA1c 8.0 on 2/13/2023 who presents with c/o phimosis. Pt is scheduled for circumcision on 3/13/2023.  ELOY stop bang score 4.  Pt denies history of ELOY, never did sleep study.

## 2023-03-10 NOTE — H&P PST ADULT - PROBLEM SELECTOR PLAN 5
h/o coronary angioplasty ( 1 stent in 2009). Pt instructed not to stop aspirin, to continue taking aspirin and to take aspirin on the day of surgery.   Seen by cardiology recently and had echo done as per pt.  Pt to follow-up with cardiologist for management.

## 2023-03-10 NOTE — H&P PST ADULT - PROBLEM SELECTOR PLAN 4
Last HgA1C 8.0 on 2/13/2023-pt stopped taking farxiga weeks ago-presently on Ozempic weekly.  Instructed to continue antidiabetic med and to hold on day of surgery.   Perioperative glucose monitoring and coverage as needed. Follow-up with PCP for diabetic management

## 2023-03-10 NOTE — H&P PST ADULT - PROBLEM SELECTOR PROBLEM 2
----- Message from KAELYN Sauceda sent at 12/27/2021  4:05 PM EST -----  Positive.  Please pleat HD paperwork.  Voicemail not set up yet.   HTN (hypertension)

## 2023-03-10 NOTE — H&P PST ADULT - HISTORY OF PRESENT ILLNESS
This is a 57 year old male with PMH of CAD, MI in 2018, s/p coronary stent in 2018, HTN, who presents with c/o phimosis. pt is scheduled for  This is a 57 year old male with PMH of CAD on aspirin, MI in 2018, s/p coronary stent in 2018, (as per pt had echo done recently at PCP's office-unable to obtain result), HTN, HLD, asthma-last attack in 2019, respiratory failure in 2018- intubated for 20 minutes as per pt, CVA in 2008-no residual deficit, Diabetes-stopped taking Farxiga weeks ago -on Ozempic weekly-has not taken it recently due to national shortage-HgA1c 8.0 on 2/13/2023 who presents with c/o phimosis. pt is scheduled for circumcision on 3/13/2023.

## 2023-03-10 NOTE — H&P PST ADULT - PROBLEM SELECTOR PLAN 1
Pt is scheduled for circumcision on 3/13/2023.  ELOY stop bang score 4.  Pt denies history of ELOY, never did sleep study.   Preoperative instructions discussed with pt and given to pt. Instructed pt to notify security when he arrives in the lobby of the hospital that he is here for surgery, that he will need someone to come to the hospital to pick him up after surgery, not to eat or drink anything after midnight the night before the surgery, to avoid NSAIDs such as Ibuprofen, Motrin, Aleve, Advil, naproxen before surgery, to take Tylenol if needed for pain, to report if he has been exposed to anyone with any contagious diseases including Covid-19 and Monkeypox or if he is exhibiting any symptoms of COVID-19 or has any rash or if he is exhibiting any symptoms of COVID-19.    Instructed about use of Chlorhexidine 4% soap for 3 days before surgery including the morning of the surgery to prevent infection. Verbalized understanding of instructions given.

## 2023-03-10 NOTE — ED ADULT NURSE NOTE - NEURO WDL
Tell when, Mr. Michaels UA did not show infection.  No culture was done.
Alert and oriented to person, place and time, memory intact, behavior appropriate to situation, PERRL.

## 2023-03-10 NOTE — H&P PST ADULT - PROBLEM SELECTOR PLAN 2
Instructed to continue antihypertensive meds and take them with sips of water on day of surgery.  Follow-up with PCP postop for management.

## 2023-03-12 ENCOUNTER — TRANSCRIPTION ENCOUNTER (OUTPATIENT)
Age: 58
End: 2023-03-12

## 2023-03-13 ENCOUNTER — TRANSCRIPTION ENCOUNTER (OUTPATIENT)
Age: 58
End: 2023-03-13

## 2023-03-13 ENCOUNTER — OUTPATIENT (OUTPATIENT)
Dept: OUTPATIENT SERVICES | Facility: HOSPITAL | Age: 58
LOS: 1 days | End: 2023-03-13
Payer: COMMERCIAL

## 2023-03-13 VITALS
SYSTOLIC BLOOD PRESSURE: 123 MMHG | TEMPERATURE: 98 F | OXYGEN SATURATION: 98 % | HEART RATE: 74 BPM | DIASTOLIC BLOOD PRESSURE: 80 MMHG | HEIGHT: 69 IN | RESPIRATION RATE: 16 BRPM | WEIGHT: 195.11 LBS

## 2023-03-13 VITALS
TEMPERATURE: 98 F | HEART RATE: 75 BPM | SYSTOLIC BLOOD PRESSURE: 135 MMHG | RESPIRATION RATE: 16 BRPM | DIASTOLIC BLOOD PRESSURE: 87 MMHG | OXYGEN SATURATION: 98 %

## 2023-03-13 DIAGNOSIS — Z95.5 PRESENCE OF CORONARY ANGIOPLASTY IMPLANT AND GRAFT: Chronic | ICD-10-CM

## 2023-03-13 DIAGNOSIS — N47.1 PHIMOSIS: ICD-10-CM

## 2023-03-13 LAB — GLUCOSE BLDC GLUCOMTR-MCNC: 137 MG/DL — HIGH (ref 70–99)

## 2023-03-13 PROCEDURE — 88304 TISSUE EXAM BY PATHOLOGIST: CPT | Mod: 26

## 2023-03-13 PROCEDURE — 88304 TISSUE EXAM BY PATHOLOGIST: CPT

## 2023-03-13 PROCEDURE — 82962 GLUCOSE BLOOD TEST: CPT

## 2023-03-13 PROCEDURE — 54161 CIRCUM 28 DAYS OR OLDER: CPT

## 2023-03-13 RX ORDER — SODIUM CHLORIDE 9 MG/ML
1000 INJECTION, SOLUTION INTRAVENOUS
Refills: 0 | Status: DISCONTINUED | OUTPATIENT
Start: 2023-03-13 | End: 2023-03-13

## 2023-03-13 RX ORDER — ONDANSETRON 8 MG/1
4 TABLET, FILM COATED ORAL ONCE
Refills: 0 | Status: DISCONTINUED | OUTPATIENT
Start: 2023-03-13 | End: 2023-03-13

## 2023-03-13 RX ORDER — SODIUM CHLORIDE 9 MG/ML
3 INJECTION INTRAMUSCULAR; INTRAVENOUS; SUBCUTANEOUS EVERY 8 HOURS
Refills: 0 | Status: DISCONTINUED | OUTPATIENT
Start: 2023-03-13 | End: 2023-03-27

## 2023-03-13 RX ORDER — HYDROMORPHONE HYDROCHLORIDE 2 MG/ML
0.5 INJECTION INTRAMUSCULAR; INTRAVENOUS; SUBCUTANEOUS
Refills: 0 | Status: DISCONTINUED | OUTPATIENT
Start: 2023-03-13 | End: 2023-03-13

## 2023-03-13 NOTE — ASU DISCHARGE PLAN (ADULT/PEDIATRIC) - NS MD DC FALL RISK RISK
For information on Fall & Injury Prevention, visit: [email or web address] OR  [email or web address] OR  https://www.cdc.gov/steadi/patient.html

## 2023-03-13 NOTE — ASU DISCHARGE PLAN (ADULT/PEDIATRIC) - CARE PROVIDER_API CALL
Fernando George)  Urology  [address] [place] [address]  Phone: [phone]  Fax: [phone]  Follow Up Time: 2 weeks

## 2023-03-13 NOTE — ASU DISCHARGE PLAN (ADULT/PEDIATRIC) - ASU DC SPECIAL INSTRUCTIONSFT
WOUND CARE: Your bandages may fall off on their own, however if they do not, please remove the bandages after 48 hours. Your sutures will dissolve on their own. You may apply bacitracin (available over the counter) or Vaseline to the incision 3 times a day for the first week. It is normal to see swelling. Some spotting or bleeding from the incision is normal. If the bleeding worsens or saturates the dressing, please call your urologist.  BATHING: You may shower after the bandages are removed. Do not soak in bathtub/pool/etc until you are cleared by your urologist at your post-operative appointment.  DIET: You may resume your regular diet and regular medication regimen.  PAIN: You may take Tylenol (acetaminophen) 650-975mg and/or Motrin (ibuprofen) 400-600mg, both available over the counter, for pain every 6 hours as needed. Do not exceed 4000mg of Tylenol (acetaminophen) daily. You may alternate these medications such that you take one or the other every 3 hours for around the clock pain coverage.  STOOL SOFTENERS: Do not allow yourself to become constipated as straining may cause bleeding. Take stool softeners or a laxative (ex. Miralax, Colace, Senokot, ExLax, etc), available over the counter, if needed.  ACTIVITY: No heavy lifting, strenuous exercise, straddle activities (bicycle), or sexual activity (including masturbation) until you are evaluated at your post-operative appointment. Otherwise, you may return to your usual level of physical activity.  ANTICOAGULATION: If you are taking any blood thinning medications, please discuss with your urologist prior to restarting these medications unless otherwise specified.  FOLLOW-UP: If you did not already schedule your post-operative appointment, please call your urologist to schedule and follow-up appointment.  CALL YOUR UROLOGIST IF: You have any bleeding that does not stop, inability to void >8 hours, fever over 100.4 F, chills, persistent nausea/vomiting, changes in your incision concerning for infection, or if your pain is not controlled on your discharge pain medications.

## 2023-03-14 ENCOUNTER — APPOINTMENT (OUTPATIENT)
Dept: PULMONOLOGY | Facility: CLINIC | Age: 58
End: 2023-03-14
Payer: COMMERCIAL

## 2023-03-14 VITALS
OXYGEN SATURATION: 94 % | RESPIRATION RATE: 16 BRPM | DIASTOLIC BLOOD PRESSURE: 88 MMHG | HEART RATE: 80 BPM | SYSTOLIC BLOOD PRESSURE: 148 MMHG

## 2023-03-14 DIAGNOSIS — J45.909 UNSPECIFIED ASTHMA, UNCOMPLICATED: ICD-10-CM

## 2023-03-14 LAB — POCT - HEMOGLOBIN (HGB), QUANTITATIVE, TRANSCUTANEOUS: 14.6

## 2023-03-14 PROCEDURE — 99213 OFFICE O/P EST LOW 20 MIN: CPT | Mod: 25

## 2023-03-14 PROCEDURE — 88738 HGB QUANT TRANSCUTANEOUS: CPT

## 2023-03-14 PROCEDURE — ZZZZZ: CPT

## 2023-03-14 PROCEDURE — 94727 GAS DIL/WSHOT DETER LNG VOL: CPT

## 2023-03-14 PROCEDURE — 94060 EVALUATION OF WHEEZING: CPT

## 2023-03-14 PROCEDURE — 94729 DIFFUSING CAPACITY: CPT

## 2023-03-14 RX ORDER — MEPOLIZUMAB 100 MG/ML
100 INJECTION, POWDER, FOR SOLUTION SUBCUTANEOUS
Refills: 0 | Status: DISCONTINUED | COMMUNITY
End: 2023-03-14

## 2023-03-14 RX ORDER — LISINOPRIL 10 MG/1
10 TABLET ORAL
Refills: 0 | Status: DISCONTINUED | COMMUNITY
End: 2023-03-14

## 2023-03-14 RX ORDER — BUDESONIDE AND FORMOTEROL FUMARATE DIHYDRATE 160; 4.5 UG/1; UG/1
160-4.5 AEROSOL RESPIRATORY (INHALATION) TWICE DAILY
Qty: 1 | Refills: 2 | Status: DISCONTINUED | COMMUNITY
End: 2023-03-14

## 2023-03-16 PROBLEM — J45.909 ASTHMA: Status: ACTIVE | Noted: 2023-03-16

## 2023-03-16 NOTE — PROCEDURE
[FreeTextEntry1] : Spirometry demonstrates mild combined obstruction and restriction without significant interval change.  Diffusing capacity normal\par

## 2023-03-16 NOTE — HISTORY OF PRESENT ILLNESS
[TextBox_4] : Follow-up for asthma.  Last seen 2019.  Little overall change.  No recent respiratory tract illness.  Has been off Nucala for several years.  Uses rescue inhaler frequently

## 2023-03-16 NOTE — ASSESSMENT
[FreeTextEntry1] : History of asthma.  It does appear his asthma is overall better than it has been previously.  He does have a significant residual pulmonary impairment this is likely related to truncal obesity.  NIOX mildly elevated.  Would start maintenance inhaled steroid

## 2023-03-16 NOTE — PHYSICAL EXAM
[No Acute Distress] : no acute distress [Normal Oropharynx] : normal oropharynx [Normal Appearance] : normal appearance [No Neck Mass] : no neck mass [Normal Rate/Rhythm] : normal rate/rhythm [Normal S1, S2] : normal s1, s2 [No Murmurs] : no murmurs [No Resp Distress] : no resp distress [No Abnormalities] : no abnormalities [Clear to Auscultation Bilaterally] : clear to auscultation bilaterally [Benign] : benign [Normal Gait] : normal gait [No Clubbing] : no clubbing [No Cyanosis] : no cyanosis [No Edema] : no edema [FROM] : FROM [Normal Color/ Pigmentation] : normal color/ pigmentation [No Focal Deficits] : no focal deficits [Normal Affect] : normal affect [Oriented x3] : oriented x3

## 2023-03-21 LAB — SURGICAL PATHOLOGY STUDY: SIGNIFICANT CHANGE UP

## 2023-03-31 NOTE — PATIENT PROFILE ADULT. - BLOOD AVOIDANCE/RESTRICTIONS, PROFILE
3/31/23, 3:54 PM EDT    DISCHARGE  [place] day: 7  Location: -23/023-A Reason for admit: Septicemia [facility]) [A41.9]  SHOLA (acute kidney injury) ([facility] [address] [N17.9]  Cellulitis of left lower extremity [L03.116]  Cellulitis and abscess of left lower extremity [L03.116, L02.416]   Procedure: 3/25: Renal US: No evidence of hydronephrosis  3/28: Art doppler LLE: No sonographic evidence of significant flow-limiting stenosis within the left lower extremity arterial vasculature. Normal-appearing ankle brachial indices are noted bilaterally  3/29: CXR: Bilateral perihilar interstitial infiltrates are seen which may represent edema versus pneumonia  3/30: [place] doppler BLE: No evidence of a DVT in the right and left lower extremities    Barriers to Discharge: PT/OT following, Nephro following for SHOLA-creat improved: 0.9-on PO k+ daily, IV Clindamycin q8hr, IV Rocephin daily for cellulitis  PCP: Pao Jimenes DO  Readmission Risk Score: 6.8%  Patient Goals/Plan/Treatment Preferences: Plan to return home with wife.
needed at discharge: Outpatient IV            Potential DME:    Patient expects to discharge to: House  Plan for transportation at discharge: Family    Financial    Payor: Leny Villaseñort / Plan: Estee Barba / Product Type: *No Product type* /     Does insurance require precert for SNF: Yes    Potential assistance Purchasing Medications: No  Meds-to-Beds request: Yes      [id number] [address] [phone]  [facility] [phone] [id number]  Phone: [phone] Fax: [phone]      Notes:    Factors facilitating achievement of predicted outcomes: Family support and Pleasant    Barriers to discharge: Medical complications and Skin Care    Additional Case Management Notes: K+ 3.4, creat 1.3, Ical 0.80, lactic acid 2.2, WBC 17.0, +BC. Tmax 101.3. IVF, IV cleocin and IV ampicllin, duonebs. PT/OT. Nephrology following. The Plan for Transition of Care is related to the following treatment goals of Septicemia ([facility] [address] [A41.9]  [facility] [N17.9]  Cellulitis of left lower extremity [L03.116]  Cellulitis and abscess of left lower extremity [L03.116, L02.416]    Patient Goals/Plan/Treatment Preferences: Met with Priti Kaufman, he plans to return home with his wife at discharge. He has used HH in the past, is uncertain if services will be needed again. He plans to discuss with his wife tonight. Transportation/Food Security/Housekeeping Addressed: No issues identified.      Lilton Goodell, RN  Case Management Department
none

## 2023-07-11 ENCOUNTER — APPOINTMENT (OUTPATIENT)
Dept: PULMONOLOGY | Facility: CLINIC | Age: 58
End: 2023-07-11

## 2023-07-19 NOTE — ED PROVIDER NOTE - MUSCULOSKELETAL, MLM
What Is The Reason For Today's Visit?: Full Body Skin Examination with No Concerns Spine appears normal, range of motion is not limited, no muscle or joint tenderness

## 2023-08-31 NOTE — PHYSICAL THERAPY INITIAL EVALUATION ADULT - ORIENTATION, REHAB EVAL
Patient is off the floor, and radiology  d/w the assigned RN    Will assess at a later time    IMargarita Knox, DO  Wound Healing and Tissue Repair  Answering service -- [phone]     oriented to person, place, time and situation

## 2023-10-31 RX ORDER — LEVOFLOXACIN 5 MG/ML
1 INJECTION, SOLUTION INTRAVENOUS
Refills: 0 | DISCHARGE
Start: 2023-10-31 | End: 2023-11-09

## 2023-10-31 RX ORDER — IPRATROPIUM/ALBUTEROL SULFATE 18-103MCG
3 AEROSOL WITH ADAPTER (GRAM) INHALATION
Refills: 0 | DISCHARGE
Start: 2023-10-31

## 2023-11-01 ENCOUNTER — INPATIENT (INPATIENT)
Facility: HOSPITAL | Age: 58
LOS: 68 days | Discharge: HOME | DRG: 1 | End: 2024-01-09
Attending: TRANSPLANT SURGERY | Admitting: INTERNAL MEDICINE
Payer: COMMERCIAL

## 2023-11-01 VITALS
HEART RATE: 86 BPM | OXYGEN SATURATION: 95 % | HEIGHT: 69 IN | TEMPERATURE: 98 F | WEIGHT: 197.09 LBS | SYSTOLIC BLOOD PRESSURE: 127 MMHG | DIASTOLIC BLOOD PRESSURE: 90 MMHG | RESPIRATION RATE: 19 BRPM

## 2023-11-01 DIAGNOSIS — R57.0 CARDIOGENIC SHOCK: ICD-10-CM

## 2023-11-01 DIAGNOSIS — I21.4 NON-ST ELEVATION (NSTEMI) MYOCARDIAL INFARCTION: ICD-10-CM

## 2023-11-01 DIAGNOSIS — Z95.5 PRESENCE OF CORONARY ANGIOPLASTY IMPLANT AND GRAFT: Chronic | ICD-10-CM

## 2023-11-01 LAB
ALBUMIN SERPL ELPH-MCNC: 3.8 G/DL — SIGNIFICANT CHANGE UP (ref 3.3–5)
ALBUMIN SERPL ELPH-MCNC: 3.8 G/DL — SIGNIFICANT CHANGE UP (ref 3.3–5)
ALP SERPL-CCNC: 49 U/L — SIGNIFICANT CHANGE UP (ref 40–120)
ALP SERPL-CCNC: 49 U/L — SIGNIFICANT CHANGE UP (ref 40–120)
ALT FLD-CCNC: 50 U/L — HIGH (ref 10–45)
ALT FLD-CCNC: 50 U/L — HIGH (ref 10–45)
ANION GAP SERPL CALC-SCNC: 13 MMOL/L — SIGNIFICANT CHANGE UP (ref 5–17)
ANION GAP SERPL CALC-SCNC: 13 MMOL/L — SIGNIFICANT CHANGE UP (ref 5–17)
ANION GAP SERPL CALC-SCNC: 15 MMOL/L — SIGNIFICANT CHANGE UP (ref 5–17)
ANION GAP SERPL CALC-SCNC: 15 MMOL/L — SIGNIFICANT CHANGE UP (ref 5–17)
APTT BLD: 24.6 SEC — SIGNIFICANT CHANGE UP (ref 24.5–35.6)
APTT BLD: 24.6 SEC — SIGNIFICANT CHANGE UP (ref 24.5–35.6)
APTT BLD: 26.5 SEC — SIGNIFICANT CHANGE UP (ref 24.5–35.6)
APTT BLD: 26.5 SEC — SIGNIFICANT CHANGE UP (ref 24.5–35.6)
AST SERPL-CCNC: 94 U/L — HIGH (ref 10–40)
AST SERPL-CCNC: 94 U/L — HIGH (ref 10–40)
BASE EXCESS BLDA CALC-SCNC: -1 — SIGNIFICANT CHANGE UP (ref -2–3)
BASE EXCESS BLDA CALC-SCNC: -1 — SIGNIFICANT CHANGE UP (ref -2–3)
BASE EXCESS BLDA CALC-SCNC: -8 — LOW (ref -2–3)
BASE EXCESS BLDA CALC-SCNC: -8 — LOW (ref -2–3)
BASE EXCESS BLDMV CALC-SCNC: -1.4 MMOL/L — SIGNIFICANT CHANGE UP (ref -3–3)
BASE EXCESS BLDMV CALC-SCNC: -1.4 MMOL/L — SIGNIFICANT CHANGE UP (ref -3–3)
BASOPHILS # BLD AUTO: 0.02 K/UL — SIGNIFICANT CHANGE UP (ref 0–0.2)
BASOPHILS NFR BLD AUTO: 0.2 % — SIGNIFICANT CHANGE UP (ref 0–2)
BASOPHILS NFR BLD AUTO: 0.2 % — SIGNIFICANT CHANGE UP (ref 0–2)
BASOPHILS NFR BLD AUTO: 0.3 % — SIGNIFICANT CHANGE UP (ref 0–2)
BASOPHILS NFR BLD AUTO: 0.3 % — SIGNIFICANT CHANGE UP (ref 0–2)
BILIRUB SERPL-MCNC: 0.3 MG/DL — SIGNIFICANT CHANGE UP (ref 0.2–1.2)
BILIRUB SERPL-MCNC: 0.3 MG/DL — SIGNIFICANT CHANGE UP (ref 0.2–1.2)
BUN SERPL-MCNC: 23 MG/DL — SIGNIFICANT CHANGE UP (ref 7–23)
BUN SERPL-MCNC: 23 MG/DL — SIGNIFICANT CHANGE UP (ref 7–23)
BUN SERPL-MCNC: 27 MG/DL — HIGH (ref 7–23)
BUN SERPL-MCNC: 27 MG/DL — HIGH (ref 7–23)
CALCIUM SERPL-MCNC: 8.3 MG/DL — LOW (ref 8.4–10.5)
CALCIUM SERPL-MCNC: 8.3 MG/DL — LOW (ref 8.4–10.5)
CALCIUM SERPL-MCNC: 9.2 MG/DL — SIGNIFICANT CHANGE UP (ref 8.4–10.5)
CALCIUM SERPL-MCNC: 9.2 MG/DL — SIGNIFICANT CHANGE UP (ref 8.4–10.5)
CHLORIDE SERPL-SCNC: 100 MMOL/L — SIGNIFICANT CHANGE UP (ref 96–108)
CHLORIDE SERPL-SCNC: 100 MMOL/L — SIGNIFICANT CHANGE UP (ref 96–108)
CHLORIDE SERPL-SCNC: 105 MMOL/L — SIGNIFICANT CHANGE UP (ref 96–108)
CHLORIDE SERPL-SCNC: 105 MMOL/L — SIGNIFICANT CHANGE UP (ref 96–108)
CO2 BLDMV-SCNC: 27 MMOL/L — SIGNIFICANT CHANGE UP (ref 21–29)
CO2 BLDMV-SCNC: 27 MMOL/L — SIGNIFICANT CHANGE UP (ref 21–29)
CO2 SERPL-SCNC: 22 MMOL/L — SIGNIFICANT CHANGE UP (ref 22–31)
COHGB MFR BLDA: 1 % — SIGNIFICANT CHANGE UP
COHGB MFR BLDA: 1 % — SIGNIFICANT CHANGE UP
COHGB MFR BLDA: 1.2 % — SIGNIFICANT CHANGE UP
COHGB MFR BLDA: 1.2 % — SIGNIFICANT CHANGE UP
CREAT SERPL-MCNC: 1.12 MG/DL — SIGNIFICANT CHANGE UP (ref 0.5–1.3)
CREAT SERPL-MCNC: 1.12 MG/DL — SIGNIFICANT CHANGE UP (ref 0.5–1.3)
CREAT SERPL-MCNC: 1.25 MG/DL — SIGNIFICANT CHANGE UP (ref 0.5–1.3)
CREAT SERPL-MCNC: 1.25 MG/DL — SIGNIFICANT CHANGE UP (ref 0.5–1.3)
EGFR: 66 ML/MIN/1.73M2 — SIGNIFICANT CHANGE UP
EGFR: 66 ML/MIN/1.73M2 — SIGNIFICANT CHANGE UP
EGFR: 76 ML/MIN/1.73M2 — SIGNIFICANT CHANGE UP
EGFR: 76 ML/MIN/1.73M2 — SIGNIFICANT CHANGE UP
EOSINOPHIL # BLD AUTO: 0.01 K/UL — SIGNIFICANT CHANGE UP (ref 0–0.5)
EOSINOPHIL # BLD AUTO: 0.01 K/UL — SIGNIFICANT CHANGE UP (ref 0–0.5)
EOSINOPHIL # BLD AUTO: 0.03 K/UL — SIGNIFICANT CHANGE UP (ref 0–0.5)
EOSINOPHIL # BLD AUTO: 0.03 K/UL — SIGNIFICANT CHANGE UP (ref 0–0.5)
EOSINOPHIL NFR BLD AUTO: 0.1 % — SIGNIFICANT CHANGE UP (ref 0–6)
EOSINOPHIL NFR BLD AUTO: 0.1 % — SIGNIFICANT CHANGE UP (ref 0–6)
EOSINOPHIL NFR BLD AUTO: 0.5 % — SIGNIFICANT CHANGE UP (ref 0–6)
EOSINOPHIL NFR BLD AUTO: 0.5 % — SIGNIFICANT CHANGE UP (ref 0–6)
GAS PNL BLDA: SIGNIFICANT CHANGE UP
GAS PNL BLDA: SIGNIFICANT CHANGE UP
GAS PNL BLDMV: SIGNIFICANT CHANGE UP
GAS PNL BLDMV: SIGNIFICANT CHANGE UP
GLUCOSE SERPL-MCNC: 107 MG/DL — HIGH (ref 70–99)
GLUCOSE SERPL-MCNC: 107 MG/DL — HIGH (ref 70–99)
GLUCOSE SERPL-MCNC: 233 MG/DL — HIGH (ref 70–99)
GLUCOSE SERPL-MCNC: 233 MG/DL — HIGH (ref 70–99)
HCO3 BLDA-SCNC: 19 — LOW (ref 21–28)
HCO3 BLDA-SCNC: 19 — LOW (ref 21–28)
HCO3 BLDA-SCNC: 24 — SIGNIFICANT CHANGE UP (ref 21–28)
HCO3 BLDA-SCNC: 24 — SIGNIFICANT CHANGE UP (ref 21–28)
HCO3 BLDMV-SCNC: 25 MMOL/L — SIGNIFICANT CHANGE UP (ref 20–28)
HCO3 BLDMV-SCNC: 25 MMOL/L — SIGNIFICANT CHANGE UP (ref 20–28)
HCT VFR BLD CALC: 42 % — SIGNIFICANT CHANGE UP (ref 39–50)
HCT VFR BLD CALC: 42 % — SIGNIFICANT CHANGE UP (ref 39–50)
HCT VFR BLD CALC: 45 % — SIGNIFICANT CHANGE UP (ref 39–50)
HCT VFR BLD CALC: 45 % — SIGNIFICANT CHANGE UP (ref 39–50)
HGB BLD-MCNC: 14.2 G/DL — SIGNIFICANT CHANGE UP (ref 13–17)
HGB BLD-MCNC: 14.2 G/DL — SIGNIFICANT CHANGE UP (ref 13–17)
HGB BLD-MCNC: 14.7 G/DL — SIGNIFICANT CHANGE UP (ref 13–17)
HGB BLD-MCNC: 14.7 G/DL — SIGNIFICANT CHANGE UP (ref 13–17)
HGB BLDA-MCNC: 14.8 G/DL — SIGNIFICANT CHANGE UP (ref 12.6–17.4)
HGB BLDA-MCNC: 14.8 G/DL — SIGNIFICANT CHANGE UP (ref 12.6–17.4)
HGB BLDA-MCNC: 14.9 G/DL — SIGNIFICANT CHANGE UP (ref 12.6–17.4)
HGB BLDA-MCNC: 14.9 G/DL — SIGNIFICANT CHANGE UP (ref 12.6–17.4)
HOROWITZ INDEX BLDMV+IHG-RTO: 100 — SIGNIFICANT CHANGE UP
HOROWITZ INDEX BLDMV+IHG-RTO: 100 — SIGNIFICANT CHANGE UP
IMM GRANULOCYTES NFR BLD AUTO: 0.2 % — SIGNIFICANT CHANGE UP (ref 0–0.9)
IMM GRANULOCYTES NFR BLD AUTO: 0.2 % — SIGNIFICANT CHANGE UP (ref 0–0.9)
IMM GRANULOCYTES NFR BLD AUTO: 0.5 % — SIGNIFICANT CHANGE UP (ref 0–0.9)
IMM GRANULOCYTES NFR BLD AUTO: 0.5 % — SIGNIFICANT CHANGE UP (ref 0–0.9)
INR BLD: 1.18 RATIO — SIGNIFICANT CHANGE UP (ref 0.85–1.18)
INR BLD: 1.18 RATIO — SIGNIFICANT CHANGE UP (ref 0.85–1.18)
INR BLD: 1.25 RATIO — HIGH (ref 0.85–1.18)
INR BLD: 1.25 RATIO — HIGH (ref 0.85–1.18)
LACTATE BLDA-MCNC: 1.3 MMOL/L — SIGNIFICANT CHANGE UP (ref 0.5–2)
LACTATE BLDA-MCNC: 1.3 MMOL/L — SIGNIFICANT CHANGE UP (ref 0.5–2)
LACTATE BLDA-MCNC: 3.7 MMOL/L — HIGH (ref 0.5–2)
LACTATE BLDA-MCNC: 3.7 MMOL/L — HIGH (ref 0.5–2)
LYMPHOCYTES # BLD AUTO: 1.55 K/UL — SIGNIFICANT CHANGE UP (ref 1–3.3)
LYMPHOCYTES # BLD AUTO: 1.55 K/UL — SIGNIFICANT CHANGE UP (ref 1–3.3)
LYMPHOCYTES # BLD AUTO: 1.6 K/UL — SIGNIFICANT CHANGE UP (ref 1–3.3)
LYMPHOCYTES # BLD AUTO: 1.6 K/UL — SIGNIFICANT CHANGE UP (ref 1–3.3)
LYMPHOCYTES # BLD AUTO: 19.4 % — SIGNIFICANT CHANGE UP (ref 13–44)
LYMPHOCYTES # BLD AUTO: 19.4 % — SIGNIFICANT CHANGE UP (ref 13–44)
LYMPHOCYTES # BLD AUTO: 23.7 % — SIGNIFICANT CHANGE UP (ref 13–44)
LYMPHOCYTES # BLD AUTO: 23.7 % — SIGNIFICANT CHANGE UP (ref 13–44)
MAGNESIUM SERPL-MCNC: 2 MG/DL — SIGNIFICANT CHANGE UP (ref 1.6–2.6)
MAGNESIUM SERPL-MCNC: 2 MG/DL — SIGNIFICANT CHANGE UP (ref 1.6–2.6)
MAGNESIUM SERPL-MCNC: 2.1 MG/DL — SIGNIFICANT CHANGE UP (ref 1.6–2.6)
MAGNESIUM SERPL-MCNC: 2.1 MG/DL — SIGNIFICANT CHANGE UP (ref 1.6–2.6)
MCHC RBC-ENTMCNC: 28.2 PG — SIGNIFICANT CHANGE UP (ref 27–34)
MCHC RBC-ENTMCNC: 28.2 PG — SIGNIFICANT CHANGE UP (ref 27–34)
MCHC RBC-ENTMCNC: 28.6 PG — SIGNIFICANT CHANGE UP (ref 27–34)
MCHC RBC-ENTMCNC: 28.6 PG — SIGNIFICANT CHANGE UP (ref 27–34)
MCHC RBC-ENTMCNC: 32.7 GM/DL — SIGNIFICANT CHANGE UP (ref 32–36)
MCHC RBC-ENTMCNC: 32.7 GM/DL — SIGNIFICANT CHANGE UP (ref 32–36)
MCHC RBC-ENTMCNC: 33.8 GM/DL — SIGNIFICANT CHANGE UP (ref 32–36)
MCHC RBC-ENTMCNC: 33.8 GM/DL — SIGNIFICANT CHANGE UP (ref 32–36)
MCV RBC AUTO: 84.5 FL — SIGNIFICANT CHANGE UP (ref 80–100)
MCV RBC AUTO: 84.5 FL — SIGNIFICANT CHANGE UP (ref 80–100)
MCV RBC AUTO: 86.4 FL — SIGNIFICANT CHANGE UP (ref 80–100)
MCV RBC AUTO: 86.4 FL — SIGNIFICANT CHANGE UP (ref 80–100)
METHGB MFR BLDA: 0.5 % — SIGNIFICANT CHANGE UP
METHGB MFR BLDA: 0.5 % — SIGNIFICANT CHANGE UP
METHGB MFR BLDA: 0.7 % — SIGNIFICANT CHANGE UP
METHGB MFR BLDA: 0.7 % — SIGNIFICANT CHANGE UP
MONOCYTES # BLD AUTO: 0.48 K/UL — SIGNIFICANT CHANGE UP (ref 0–0.9)
MONOCYTES NFR BLD AUTO: 5.8 % — SIGNIFICANT CHANGE UP (ref 2–14)
MONOCYTES NFR BLD AUTO: 5.8 % — SIGNIFICANT CHANGE UP (ref 2–14)
MONOCYTES NFR BLD AUTO: 7.4 % — SIGNIFICANT CHANGE UP (ref 2–14)
MONOCYTES NFR BLD AUTO: 7.4 % — SIGNIFICANT CHANGE UP (ref 2–14)
NEUTROPHILS # BLD AUTO: 4.44 K/UL — SIGNIFICANT CHANGE UP (ref 1.8–7.4)
NEUTROPHILS # BLD AUTO: 4.44 K/UL — SIGNIFICANT CHANGE UP (ref 1.8–7.4)
NEUTROPHILS # BLD AUTO: 6.09 K/UL — SIGNIFICANT CHANGE UP (ref 1.8–7.4)
NEUTROPHILS # BLD AUTO: 6.09 K/UL — SIGNIFICANT CHANGE UP (ref 1.8–7.4)
NEUTROPHILS NFR BLD AUTO: 67.9 % — SIGNIFICANT CHANGE UP (ref 43–77)
NEUTROPHILS NFR BLD AUTO: 67.9 % — SIGNIFICANT CHANGE UP (ref 43–77)
NEUTROPHILS NFR BLD AUTO: 74 % — SIGNIFICANT CHANGE UP (ref 43–77)
NEUTROPHILS NFR BLD AUTO: 74 % — SIGNIFICANT CHANGE UP (ref 43–77)
NRBC # BLD: 0 /100 WBCS — SIGNIFICANT CHANGE UP (ref 0–0)
NT-PROBNP SERPL-SCNC: 4168 PG/ML — HIGH (ref 0–300)
NT-PROBNP SERPL-SCNC: 4168 PG/ML — HIGH (ref 0–300)
O2 CT VFR BLD CALC: 60 MMHG — SIGNIFICANT CHANGE UP (ref 30–65)
O2 CT VFR BLD CALC: 60 MMHG — SIGNIFICANT CHANGE UP (ref 30–65)
OXYHGB MFR BLDA: 98.1 % — HIGH (ref 90–95)
OXYHGB MFR BLDA: 98.1 % — HIGH (ref 90–95)
OXYHGB MFR BLDA: 98.3 % — HIGH (ref 90–95)
OXYHGB MFR BLDA: 98.3 % — HIGH (ref 90–95)
PCO2 BLDA: 38 MMHG — SIGNIFICANT CHANGE UP (ref 35–48)
PCO2 BLDA: 38 MMHG — SIGNIFICANT CHANGE UP (ref 35–48)
PCO2 BLDA: 44 MMHG — SIGNIFICANT CHANGE UP (ref 35–48)
PCO2 BLDA: 44 MMHG — SIGNIFICANT CHANGE UP (ref 35–48)
PCO2 BLDMV: 49 MMHG — SIGNIFICANT CHANGE UP (ref 30–65)
PCO2 BLDMV: 49 MMHG — SIGNIFICANT CHANGE UP (ref 30–65)
PH BLDA: 7.24 — LOW (ref 7.35–7.45)
PH BLDA: 7.24 — LOW (ref 7.35–7.45)
PH BLDA: 7.4 — SIGNIFICANT CHANGE UP (ref 7.35–7.45)
PH BLDA: 7.4 — SIGNIFICANT CHANGE UP (ref 7.35–7.45)
PH BLDMV: 7.32 — SIGNIFICANT CHANGE UP (ref 7.32–7.45)
PH BLDMV: 7.32 — SIGNIFICANT CHANGE UP (ref 7.32–7.45)
PHOSPHATE SERPL-MCNC: 4.2 MG/DL — SIGNIFICANT CHANGE UP (ref 2.5–4.5)
PHOSPHATE SERPL-MCNC: 4.2 MG/DL — SIGNIFICANT CHANGE UP (ref 2.5–4.5)
PLATELET # BLD AUTO: 148 K/UL — LOW (ref 150–400)
PLATELET # BLD AUTO: 148 K/UL — LOW (ref 150–400)
PLATELET # BLD AUTO: 173 K/UL — SIGNIFICANT CHANGE UP (ref 150–400)
PLATELET # BLD AUTO: 173 K/UL — SIGNIFICANT CHANGE UP (ref 150–400)
PO2 BLDA: 183 MMHG — HIGH (ref 83–108)
PO2 BLDA: 183 MMHG — HIGH (ref 83–108)
PO2 BLDA: 361 MMHG — HIGH (ref 83–108)
PO2 BLDA: 361 MMHG — HIGH (ref 83–108)
POTASSIUM SERPL-MCNC: 3.3 MMOL/L — LOW (ref 3.5–5.3)
POTASSIUM SERPL-MCNC: 3.3 MMOL/L — LOW (ref 3.5–5.3)
POTASSIUM SERPL-MCNC: 4.1 MMOL/L — SIGNIFICANT CHANGE UP (ref 3.5–5.3)
POTASSIUM SERPL-MCNC: 4.1 MMOL/L — SIGNIFICANT CHANGE UP (ref 3.5–5.3)
POTASSIUM SERPL-SCNC: 3.3 MMOL/L — LOW (ref 3.5–5.3)
POTASSIUM SERPL-SCNC: 3.3 MMOL/L — LOW (ref 3.5–5.3)
POTASSIUM SERPL-SCNC: 4.1 MMOL/L — SIGNIFICANT CHANGE UP (ref 3.5–5.3)
POTASSIUM SERPL-SCNC: 4.1 MMOL/L — SIGNIFICANT CHANGE UP (ref 3.5–5.3)
PROT SERPL-MCNC: 6.7 G/DL — SIGNIFICANT CHANGE UP (ref 6–8.3)
PROT SERPL-MCNC: 6.7 G/DL — SIGNIFICANT CHANGE UP (ref 6–8.3)
PROTHROM AB SERPL-ACNC: 12.9 SEC — SIGNIFICANT CHANGE UP (ref 9.5–13)
PROTHROM AB SERPL-ACNC: 12.9 SEC — SIGNIFICANT CHANGE UP (ref 9.5–13)
PROTHROM AB SERPL-ACNC: 13 SEC — SIGNIFICANT CHANGE UP (ref 9.5–13)
PROTHROM AB SERPL-ACNC: 13 SEC — SIGNIFICANT CHANGE UP (ref 9.5–13)
RAPID RVP RESULT: DETECTED
RAPID RVP RESULT: DETECTED
RBC # BLD: 4.97 M/UL — SIGNIFICANT CHANGE UP (ref 4.2–5.8)
RBC # BLD: 4.97 M/UL — SIGNIFICANT CHANGE UP (ref 4.2–5.8)
RBC # BLD: 5.21 M/UL — SIGNIFICANT CHANGE UP (ref 4.2–5.8)
RBC # BLD: 5.21 M/UL — SIGNIFICANT CHANGE UP (ref 4.2–5.8)
RBC # FLD: 14.4 % — SIGNIFICANT CHANGE UP (ref 10.3–14.5)
RBC # FLD: 14.4 % — SIGNIFICANT CHANGE UP (ref 10.3–14.5)
RBC # FLD: 14.5 % — SIGNIFICANT CHANGE UP (ref 10.3–14.5)
RBC # FLD: 14.5 % — SIGNIFICANT CHANGE UP (ref 10.3–14.5)
SAO2 % BLDA: 100 % — HIGH (ref 94–98)
SAO2 % BLDA: 100 % — HIGH (ref 94–98)
SAO2 % BLDA: 99.8 % — HIGH (ref 94–98)
SAO2 % BLDA: 99.8 % — HIGH (ref 94–98)
SAO2 % BLDMV: 82.8 — SIGNIFICANT CHANGE UP (ref 60–90)
SAO2 % BLDMV: 82.8 — SIGNIFICANT CHANGE UP (ref 60–90)
SARS-COV-2 RNA SPEC QL NAA+PROBE: DETECTED
SARS-COV-2 RNA SPEC QL NAA+PROBE: DETECTED
SODIUM SERPL-SCNC: 137 MMOL/L — SIGNIFICANT CHANGE UP (ref 135–145)
SODIUM SERPL-SCNC: 137 MMOL/L — SIGNIFICANT CHANGE UP (ref 135–145)
SODIUM SERPL-SCNC: 140 MMOL/L — SIGNIFICANT CHANGE UP (ref 135–145)
SODIUM SERPL-SCNC: 140 MMOL/L — SIGNIFICANT CHANGE UP (ref 135–145)
TROPONIN T, HIGH SENSITIVITY RESULT: 414 NG/L — HIGH (ref 0–51)
TROPONIN T, HIGH SENSITIVITY RESULT: 414 NG/L — HIGH (ref 0–51)
TROPONIN T, HIGH SENSITIVITY RESULT: 440 NG/L — HIGH (ref 0–51)
TROPONIN T, HIGH SENSITIVITY RESULT: 440 NG/L — HIGH (ref 0–51)
WBC # BLD: 6.53 K/UL — SIGNIFICANT CHANGE UP (ref 3.8–10.5)
WBC # BLD: 6.53 K/UL — SIGNIFICANT CHANGE UP (ref 3.8–10.5)
WBC # BLD: 8.24 K/UL — SIGNIFICANT CHANGE UP (ref 3.8–10.5)
WBC # BLD: 8.24 K/UL — SIGNIFICANT CHANGE UP (ref 3.8–10.5)
WBC # FLD AUTO: 6.53 K/UL — SIGNIFICANT CHANGE UP (ref 3.8–10.5)
WBC # FLD AUTO: 6.53 K/UL — SIGNIFICANT CHANGE UP (ref 3.8–10.5)
WBC # FLD AUTO: 8.24 K/UL — SIGNIFICANT CHANGE UP (ref 3.8–10.5)
WBC # FLD AUTO: 8.24 K/UL — SIGNIFICANT CHANGE UP (ref 3.8–10.5)

## 2023-11-01 PROCEDURE — 93010 ELECTROCARDIOGRAM REPORT: CPT

## 2023-11-01 PROCEDURE — 99291 CRITICAL CARE FIRST HOUR: CPT

## 2023-11-01 PROCEDURE — 76937 US GUIDE VASCULAR ACCESS: CPT | Mod: 26

## 2023-11-01 PROCEDURE — 93456 R HRT CORONARY ARTERY ANGIO: CPT | Mod: 26

## 2023-11-01 PROCEDURE — 33967 INSERT I-AORT PERCUT DEVICE: CPT

## 2023-11-01 PROCEDURE — 36620 INSERTION CATHETER ARTERY: CPT

## 2023-11-01 PROCEDURE — 71045 X-RAY EXAM CHEST 1 VIEW: CPT | Mod: 26

## 2023-11-01 PROCEDURE — 93306 TTE W/DOPPLER COMPLETE: CPT | Mod: 26

## 2023-11-01 PROCEDURE — 99222 1ST HOSP IP/OBS MODERATE 55: CPT

## 2023-11-01 RX ORDER — MIDAZOLAM HYDROCHLORIDE 1 MG/ML
2 INJECTION, SOLUTION INTRAMUSCULAR; INTRAVENOUS ONCE
Refills: 0 | Status: DISCONTINUED | OUTPATIENT
Start: 2023-11-01 | End: 2023-11-01

## 2023-11-01 RX ORDER — TICAGRELOR 90 MG/1
180 TABLET ORAL ONCE
Refills: 0 | Status: COMPLETED | OUTPATIENT
Start: 2023-11-01 | End: 2023-11-01

## 2023-11-01 RX ORDER — SEMAGLUTIDE 0.68 MG/ML
0.5 INJECTION, SOLUTION SUBCUTANEOUS
Qty: 0 | Refills: 0 | DISCHARGE

## 2023-11-01 RX ORDER — TICAGRELOR 90 MG/1
90 TABLET ORAL EVERY 12 HOURS
Refills: 0 | Status: DISCONTINUED | OUTPATIENT
Start: 2023-11-02 | End: 2023-11-13

## 2023-11-01 RX ORDER — HEPARIN SODIUM 5000 [USP'U]/ML
INJECTION INTRAVENOUS; SUBCUTANEOUS
Qty: 25000 | Refills: 0 | Status: DISCONTINUED | OUTPATIENT
Start: 2023-11-01 | End: 2023-11-01

## 2023-11-01 RX ORDER — PANTOPRAZOLE SODIUM 20 MG/1
40 TABLET, DELAYED RELEASE ORAL EVERY 12 HOURS
Refills: 0 | Status: DISCONTINUED | OUTPATIENT
Start: 2023-11-01 | End: 2023-11-03

## 2023-11-01 RX ORDER — HEPARIN SODIUM 5000 [USP'U]/ML
5300 INJECTION INTRAVENOUS; SUBCUTANEOUS EVERY 6 HOURS
Refills: 0 | Status: DISCONTINUED | OUTPATIENT
Start: 2023-11-01 | End: 2023-11-01

## 2023-11-01 RX ORDER — LABETALOL HCL 100 MG
20 TABLET ORAL ONCE
Refills: 0 | Status: DISCONTINUED | OUTPATIENT
Start: 2023-11-01 | End: 2023-11-01

## 2023-11-01 RX ORDER — PROPOFOL 10 MG/ML
40 INJECTION, EMULSION INTRAVENOUS
Qty: 500 | Refills: 0 | Status: DISCONTINUED | OUTPATIENT
Start: 2023-11-01 | End: 2023-11-03

## 2023-11-01 RX ORDER — ATORVASTATIN CALCIUM 80 MG/1
80 TABLET, FILM COATED ORAL AT BEDTIME
Refills: 0 | Status: DISCONTINUED | OUTPATIENT
Start: 2023-11-01 | End: 2023-12-25

## 2023-11-01 RX ORDER — LEVOFLOXACIN 5 MG/ML
500 INJECTION, SOLUTION INTRAVENOUS EVERY 24 HOURS
Refills: 0 | Status: DISCONTINUED | OUTPATIENT
Start: 2023-11-01 | End: 2023-11-02

## 2023-11-01 RX ORDER — FUROSEMIDE 40 MG
40 TABLET ORAL ONCE
Refills: 0 | Status: DISCONTINUED | OUTPATIENT
Start: 2023-11-01 | End: 2023-11-01

## 2023-11-01 RX ORDER — HEPARIN SODIUM 5000 [USP'U]/ML
5000 INJECTION INTRAVENOUS; SUBCUTANEOUS ONCE
Refills: 0 | Status: COMPLETED | OUTPATIENT
Start: 2023-11-01 | End: 2023-11-01

## 2023-11-01 RX ORDER — CHLORHEXIDINE GLUCONATE 213 G/1000ML
15 SOLUTION TOPICAL EVERY 12 HOURS
Refills: 0 | Status: DISCONTINUED | OUTPATIENT
Start: 2023-11-01 | End: 2023-11-03

## 2023-11-01 RX ORDER — HEPARIN SODIUM 5000 [USP'U]/ML
1000 INJECTION INTRAVENOUS; SUBCUTANEOUS
Qty: 25000 | Refills: 0 | Status: DISCONTINUED | OUTPATIENT
Start: 2023-11-01 | End: 2023-11-03

## 2023-11-01 RX ORDER — POTASSIUM CHLORIDE 20 MEQ
40 PACKET (EA) ORAL
Refills: 0 | Status: COMPLETED | OUTPATIENT
Start: 2023-11-01 | End: 2023-11-02

## 2023-11-01 RX ORDER — ASPIRIN/CALCIUM CARB/MAGNESIUM 324 MG
81 TABLET ORAL DAILY
Refills: 0 | Status: DISCONTINUED | OUTPATIENT
Start: 2023-11-02 | End: 2023-12-25

## 2023-11-01 RX ORDER — BUDESONIDE AND FORMOTEROL FUMARATE DIHYDRATE 160; 4.5 UG/1; UG/1
2 AEROSOL RESPIRATORY (INHALATION)
Qty: 0 | Refills: 0 | DISCHARGE

## 2023-11-01 RX ADMIN — HEPARIN SODIUM 1000 UNIT(S)/HR: 5000 INJECTION INTRAVENOUS; SUBCUTANEOUS at 08:12

## 2023-11-01 RX ADMIN — PANTOPRAZOLE SODIUM 40 MILLIGRAM(S): 20 TABLET, DELAYED RELEASE ORAL at 23:11

## 2023-11-01 RX ADMIN — PROPOFOL 21.5 MICROGRAM(S)/KG/MIN: 10 INJECTION, EMULSION INTRAVENOUS at 23:10

## 2023-11-01 RX ADMIN — Medication 40 MILLIEQUIVALENT(S): at 23:11

## 2023-11-01 RX ADMIN — Medication 1 MILLIGRAM(S): at 13:31

## 2023-11-01 RX ADMIN — HEPARIN SODIUM 5000 UNIT(S): 5000 INJECTION INTRAVENOUS; SUBCUTANEOUS at 08:11

## 2023-11-01 RX ADMIN — TICAGRELOR 180 MILLIGRAM(S): 90 TABLET ORAL at 08:11

## 2023-11-01 NOTE — H&P ADULT - ASSESSMENT
57 yo male h/o htn, cad s/p pci, ICH, here with NSTEMI    NSTEMI  cards consult f/u  hep gtt  asa/brillinta as per cards  plan for cath today  echo noted  tele  check a1c, tsh, flp,     pna  recently diagnosed outpt  complete course of oral levaquin    htn  bp acceptable  hold home bp meds as per cards    h/o ICH  resolved          Advanced care planning was discussed with patient and family.  Advanced care planning forms were reviewed and discussed as appropriate.  Differential diagnosis and plan of care discussed with patient after the evaluation.   Pain assessed and judicious use of narcotics when appropriate was discussed.  Importance of Fall prevention discussed.  Counseling on Smoking and Alcohol cessation was offered when appropriate.  Counseling on Diet, exercise, and medication compliance was done.       Approx 75 minutes spent.

## 2023-11-01 NOTE — ED PROVIDER NOTE - CLINICAL SUMMARY MEDICAL DECISION MAKING FREE TEXT BOX
59yo M w/ hx HTN, CAD w/ 1 stent, ICH (2008) presenting with abn ekg. Patient presented to [facility] where he was found to have STEMI, recommended to get cath however patient did not want to get it there so it left and came here.  Patient initially had cough, congestion, fever, was placed on antibiotics on Sunday.  Started feeling nauseous and had a presyncopal event after which he presented to ED last night.  Had chest pain as well.  Chest pain is midsternal.  Not currently having chest pain.  Received 4 aspirin 30 min pta. Exam shows well appearing male, VSS. ekg not stemi, shows lvh. c/f nstemi. will get labs, cards.

## 2023-11-01 NOTE — ED ADULT NURSE REASSESSMENT NOTE - NS ED NURSE REASSESS COMMENT FT1
Patient sitting comfortably in bed. Daughter is present at bedside. Patient on continuous cardiac monitoring. Updated on POC. No acute distress present at this time. Call bell within reach, bed in lowest position.

## 2023-11-01 NOTE — ED PROVIDER NOTE - PHYSICAL EXAMINATION
General appearance: NAD, conversant, afebrile    Eyes: anicteric sclerae, MERARI, EOMI   HENT: Atraumatic; oropharynx clear, MMM and no ulcerations, no pharyngeal erythema or exudate   Neck: Trachea midline; Full range of motion, supple   Pulm: CTA bl, normal respiratory effort and no intercostal retractions, normal work of breathing   CV: RRR, No murmurs, rubs, or gallops.    Abdomen: Soft, non-tender, non-distended; no guarding or rebound   Extremities: No peripheral edema or extremity lymphadenopathy. 5/5 strength in all four extremities.   Skin: Dry, normal temperature, turgor and texture; no rash, ulcers or subcutaneous nodules   Psych: Appropriate affect, cooperative; alert and oriented to person, place and time

## 2023-11-01 NOTE — ED ADULT NURSE NOTE - NSFALLUNIVINTERV_ED_ALL_ED
Bed/Stretcher in lowest position, wheels locked, appropriate side rails in place/Call bell, personal items and telephone in reach/Instruct patient to call for assistance before getting out of bed/chair/stretcher/Non-slip footwear applied when patient is off stretcher/[place] to call system/Physically safe environment - no spills, clutter or unnecessary equipment/Purposeful proactive rounding/Room/bathroom lighting operational, light cord in reach

## 2023-11-01 NOTE — H&P ADULT - NSHPPHYSICALEXAM_GEN_ALL_CORE
Vital Signs Last 24 Hrs  T(C): 36.6 (01 Nov 2023 12:26), Max: 36.8 (01 Nov 2023 10:00)  T(F): 97.9 (01 Nov 2023 12:26), Max: 98.2 (01 Nov 2023 10:00)  HR: 88 (01 Nov 2023 12:26) (77 - 88)  BP: 127/82 (01 Nov 2023 12:26) (115/95 - 129/98)  BP(mean): 100 (01 Nov 2023 12:26) (100 - 101)  RR: 18 (01 Nov 2023 12:26) (14 - 19)  SpO2: 97% (01 Nov 2023 12:26) (95% - 97%)    Parameters below as of 01 Nov 2023 12:26  Patient On (Oxygen Delivery Method): room air      PHYSICAL EXAM:  GENERAL: NAD, well-developed  HEAD:  Atraumatic, Normocephalic  EYES: EOMI, PERRLA, conjunctiva and sclera clear  NECK: Supple, No JVD  CHEST/LUNG: Clear to auscultation bilaterally; No wheeze  HEART: Regular rate and rhythm; No murmurs, rubs, or gallops  ABDOMEN: Soft, Nontender, Nondistended; Bowel sounds present  EXTREMITIES:  2+ Peripheral Pulses, No clubbing, cyanosis, or edema  PSYCH: AAOx3  NEUROLOGY: non-focal  SKIN: No rashes or lesions Vital Signs Last 24 Hrs  T(C): 36.6 (01 Nov 2023 12:26), Max: 36.8 (01 Nov 2023 10:00)  T(F): 97.9 (01 Nov 2023 12:26), Max: 98.2 (01 Nov 2023 10:00)  HR: 88 (01 Nov 2023 12:26) (77 - 88)  BP: 127/82 (01 Nov 2023 12:26) (115/95 - 129/98)  BP(mean): 100 (01 Nov 2023 12:26) (100 - 101)  RR: 18 (01 Nov 2023 12:26) (14 - 19)  SpO2: 97% (01 Nov 2023 12:26) (95% - 97%)    Parameters below as of 01 Nov 2023 12:26  Patient On (Oxygen Delivery Method): room air      PHYSICAL EXAM:  GENERAL: NAD, well-developed  HEAD:  Atraumatic, Normocephalic  EYES: EOMI, PERRLA, conjunctiva and sclera clear  NECK: Supple, No JVD  CHEST/LUNG: Clear to auscultation bilaterally; No wheeze  HEART: Regular rate and rhythm; No murmurs, rubs, or gallops  ABDOMEN: Soft, Nontender, Nondistended; Bowel sounds present  EXTREMITIES:  2+ Peripheral Pulses, No clubbing, cyanosis, or edema  PSYCH: AAOx3, anxious  NEUROLOGY: non-focal  SKIN: No rashes or lesions

## 2023-11-01 NOTE — CONSULT NOTE ADULT - SUBJECTIVE AND OBJECTIVE BOX
Josh Yoon MD  Cardiology Fellow  [phone]  All Cardiology service information can be found 24/7 on [email or web address], password: [email or web address]    Patient seen and evaluated at bedside    HPI:  58M with HTN, CAD (s/p PCI 2008), ICH 2008, HFrEF (EF severely reduced 2018) presenting with chest pressure. He notes that he was feeling SOB for the last week, was told he had bilateral pneumonia and started on antibiotics. Has been coughing and having intermittent chest pressure. On the night of presentation, he states that he was diaphoretic, nauseous, and leg tingling. Patient is a poor historian, but per family he passed out briefly for a few seconds and had a heart rate of 180s. EMS was called, who defibrillated patient at his home, no strips available. Patient taken to [facility], loaded with ASA. Patient left AMA because he wanted to be seen at [facility].    On arrival, EKG with ST depressions in lateral leads, LVH, ST elevations in anterior leads not meeting STEMI criteria. Not complaining of chest pain. Troponin 440 -> 414. Pro-BNP 4168. Creatinine 1.25, AST/ALT 94/50.     Cardiac meds:  coreg 25, HCTZ 25, losartan 25, plavix.     PMHx:   HTN (hypertension)    CAD (coronary artery disease)    Intracranial hemorrhage    Respiratory arrest    Myocardial infarction, unspecified MI type, unspecified artery        PSHx:   History of coronary artery stent placement        Allergies:  penicillins (Unknown)  shellfish (Angioedema)  contrast dye (Short breath)      Current Medications:   heparin   Injectable 5300 Unit(s) IV Push every 6 hours PRN  heparin  Infusion.  Unit(s)/Hr IV Continuous <Continuous>      FAMILY HISTORY:  Family history of meningitis (Sibling)  Brother, death at age 49 from complications of infection (June 2015)    Family history of hypertension in mother  Mother    REVIEW OF SYSTEMS:  CONSTITUTIONAL: No weakness, fevers or chills  EYES/ENT: No visual changes;  No dysphagia  NECK: No pain or stiffness  RESPIRATORY: No cough, wheezing, hemoptysis; No shortness of breath  CARDIOVASCULAR: No chest pain or palpitations; No lower extremity edema  GASTROINTESTINAL: No abdominal or epigastric pain. No nausea, vomiting, or hematemesis; No diarrhea or constipation. No melena or hematochezia.  BACK: No back pain  GENITOURINARY: No dysuria, frequency or hematuria  NEUROLOGICAL: No numbness or weakness  SKIN: No itching, burning, rashes, or lesions   All other review of systems is negative unless indicated above.    Physical Exam:  T(F): 98.2 (11-01), Max: 98.2 (11-01)  HR: 77 (11-01) (77 - 87)  BP: 126/89 (11-01) (115/95 - 129/98)  RR: 17 (11-01)  SpO2: 95% (11-01)  GEN: NAD  HEENT: EOMI, clear sclera  PULM: crackles at bases  CV: RRR S1 S2, no murmur, no JVD  ABD: S, NT, ND  EXT: WWP, no edema  PSYCH: normal affect  SKIN: No rash    CXR: Personally reviewed    Labs: Personally reviewed                        14.7   8.24  )-----------( 173      ( 01 Nov 2023 06:14 )             45.0     11-01    137  |  100  |  27<H>  ----------------------------<  233<H>  4.1   |  22  |  1.25    Ca    9.2      01 Nov 2023 06:14  Mg     2.1     11-01    TPro  6.7  /  Alb  3.8  /  TBili  0.3  /  DBili  x   /  AST  94<H>  /  ALT  50<H>  /  AlkPhos  49  11-01    PT/INR - ( 01 Nov 2023 06:14 )   PT: 13.0 sec;   INR: 1.25 ratio         PTT - ( 01 Nov 2023 06:14 )  PTT:26.5 sec    CARDIAC MARKERS ( 01 Nov 2023 07:51 )  414 ng/L / x     / x     / x     / x     / x      CARDIAC MARKERS ( 01 Nov 2023 06:14 )  440 ng/L / x     / x     / x     / x     / x                     Josh Yoon MD  Cardiology Fellow  [phone]  All Cardiology service information can be found 24/7 on [email or web address], password: [email or web address]    Patient seen and evaluated at bedside    HPI:  58M with HTN, CAD (s/p PCI 2008), ICH 2008, HFrEF (EF severely reduced 2018) presenting with chest pressure. He notes that he was feeling SOB for the last week, was told he had bilateral pneumonia and started on antibiotics. Has been coughing and having intermittent chest pressure. On the night of presentation, he states that he was diaphoretic, nauseous, and leg tingling. Patient is a poor historian, but per family he passed out briefly for a few seconds and had a heart rate of 180s. EMS was called, who defibrillated patient at his home, no strips available. Patient taken to [facility], loaded with ASA. Patient left AMA because he wanted to be seen at [facility].    On arrival, EKG with ST depressions in lateral leads, LVH, ST elevations in anterior leads not meeting STEMI criteria. Not complaining of chest pain. Troponin 440 -> 414. Pro-BNP 4168. Creatinine 1.25, AST/ALT 94/50.     Cardiac meds:  coreg 25, HCTZ 25, losartan 25, plavix.     PMHx:   HTN (hypertension)    CAD (coronary artery disease)    Intracranial hemorrhage    Respiratory arrest    Myocardial infarction, unspecified MI type, unspecified artery        PSHx:   History of coronary artery stent placement        Allergies:  penicillins (Unknown)  shellfish (Angioedema)  contrast dye (Short breath)      Current Medications:   heparin   Injectable 5300 Unit(s) IV Push every 6 hours PRN  heparin  Infusion.  Unit(s)/Hr IV Continuous <Continuous>      FAMILY HISTORY:  Family history of meningitis (Sibling)  Brother, death at age 49 from complications of infection (June 2015)    Family history of hypertension in mother  Mother    REVIEW OF SYSTEMS:  CONSTITUTIONAL: No weakness, fevers or chills  EYES/ENT: No visual changes;  No dysphagia  NECK: No pain or stiffness  RESPIRATORY: No cough, wheezing, hemoptysis; No shortness of breath  CARDIOVASCULAR: No chest pain or palpitations; No lower extremity edema  GASTROINTESTINAL: No abdominal or epigastric pain. No nausea, vomiting, or hematemesis; No diarrhea or constipation. No melena or hematochezia.  BACK: No back pain  GENITOURINARY: No dysuria, frequency or hematuria  NEUROLOGICAL: No numbness or weakness  SKIN: No itching, burning, rashes, or lesions   All other review of systems is negative unless indicated above.    Physical Exam:  T(F): 98.2 (11-01), Max: 98.2 (11-01)  HR: 77 (11-01) (77 - 87)  BP: 126/89 (11-01) (115/95 - 129/98)  RR: 17 (11-01)  SpO2: 95% (11-01)  GEN: NAD  HEENT: EOMI, clear sclera  PULM: crackles at bases  CV: RRR S1 S2, no murmur, no JVD  ABD: S, NT, ND  EXT: WWP, no edema  PSYCH: normal affect  SKIN: No rash    CXR: Personally reviewed    Labs: Personally reviewed                        14.7   8.24  )-----------( 173      ( 01 Nov 2023 06:14 )             45.0     11-01    137  |  100  |  27<H>  ----------------------------<  233<H>  4.1   |  22  |  1.25    Ca    9.2      01 Nov 2023 06:14  Mg     2.1     11-01    TPro  6.7  /  Alb  3.8  /  TBili  0.3  /  DBili  x   /  AST  94<H>  /  ALT  50<H>  /  AlkPhos  49  11-01    PT/INR - ( 01 Nov 2023 06:14 )   PT: 13.0 sec;   INR: 1.25 ratio         PTT - ( 01 Nov 2023 06:14 )  PTT:26.5 sec    CARDIAC MARKERS ( 01 Nov 2023 07:51 )  414 ng/L / x     / x     / x     / x     / x      CARDIAC MARKERS ( 01 Nov 2023 06:14 )  440 ng/L / x     / x     / x     / x     / x                     Josh Yoon MD  Cardiology Fellow  [phone]  All Cardiology service information can be found 24/7 on [email or web address], password: [email or web address]    Patient seen and evaluated at bedside    HPI:  58M with HTN, CAD (s/p PCI 2008), ICH 2008, HFrEF (EF severely reduced 2018) presenting with chest pressure. He notes that he was feeling SOB for the last week, was told he had bilateral pneumonia and started on antibiotics. Has been coughing and having intermittent chest pressure. On the night of presentation, he states that he was diaphoretic, nauseous, and leg tingling. Patient is a poor historian, but per family he passed out briefly for a few seconds and had a heart rate of 180s. EMS was called, who defibrillated patient at his home, no strips available. Patient taken to [facility], loaded with ASA. Patient left AMA because he wanted to be seen at [facility].    On arrival, EKG with ST depressions in lateral leads, LVH, ST elevations in anterior leads not meeting STEMI criteria. Not complaining of chest pain. Troponin 440 -> 414. Pro-BNP 4168. Creatinine 1.25, AST/ALT 94/50.     Cardiac meds:  coreg 25, HCTZ 25, losartan 25, plavix.     PMHx:   HTN (hypertension)    CAD (coronary artery disease)    Intracranial hemorrhage    Respiratory arrest    Myocardial infarction, unspecified MI type, unspecified artery        PSHx:   History of coronary artery stent placement        Allergies:  penicillins (Unknown)  shellfish (Angioedema)  contrast dye (Short breath)      Current Medications:   heparin   Injectable 5300 Unit(s) IV Push every 6 hours PRN  heparin  Infusion.  Unit(s)/Hr IV Continuous <Continuous>      FAMILY HISTORY:  Family history of meningitis (Sibling)  Brother, death at age 49 from complications of infection (June 2015)    Family history of hypertension in mother  Mother    REVIEW OF SYSTEMS:  CONSTITUTIONAL: No weakness, fevers or chills  EYES/ENT: No visual changes;  No dysphagia  NECK: No pain or stiffness  RESPIRATORY: No cough, wheezing, hemoptysis; No shortness of breath  CARDIOVASCULAR: No chest pain or palpitations; No lower extremity edema  GASTROINTESTINAL: No abdominal or epigastric pain. No nausea, vomiting, or hematemesis; No diarrhea or constipation. No melena or hematochezia.  BACK: No back pain  GENITOURINARY: No dysuria, frequency or hematuria  NEUROLOGICAL: No numbness or weakness  SKIN: No itching, burning, rashes, or lesions   All other review of systems is negative unless indicated above.    Physical Exam:  T(F): 98.2 (11-01), Max: 98.2 (11-01)  HR: 77 (11-01) (77 - 87)  BP: 126/89 (11-01) (115/95 - 129/98)  RR: 17 (11-01)  SpO2: 95% (11-01)  GEN: NAD  HEENT: EOMI, clear sclera  PULM: crackles at bases  CV: RRR S1 S2, no murmur, no JVD  ABD: S, NT, ND  EXT: WWP, no edema  PSYCH: normal affect  SKIN: No rash    CXR: Personally reviewed    Labs: Personally reviewed                        14.7   8.24  )-----------( 173      ( 01 Nov 2023 06:14 )             45.0     11-01    137  |  100  |  27<H>  ----------------------------<  233<H>  4.1   |  22  |  1.25    Ca    9.2      01 Nov 2023 06:14  Mg     2.1     11-01    TPro  6.7  /  Alb  3.8  /  TBili  0.3  /  DBili  x   /  AST  94<H>  /  ALT  50<H>  /  AlkPhos  49  11-01    PT/INR - ( 01 Nov 2023 06:14 )   PT: 13.0 sec;   INR: 1.25 ratio         PTT - ( 01 Nov 2023 06:14 )  PTT:26.5 sec    CARDIAC MARKERS ( 01 Nov 2023 07:51 )  414 ng/L / x     / x     / x     / x     / x      CARDIAC MARKERS ( 01 Nov 2023 06:14 )  440 ng/L / x     / x     / x     / x     / x                     Josh Yoon MD  Cardiology Fellow  [phone]  All Cardiology service information can be found 24/7 on [email or web address], password: [email or web address]    Patient seen and evaluated at bedside    HPI:  58M with HTN, CAD (s/p PCI 2008), ICH 2008, HFrEF (EF severely reduced 2018) presenting with chest pressure. He notes that he was feeling SOB for the last week, was told he had bilateral pneumonia and started on antibiotics. Has been coughing and having intermittent chest pressure. On the night of presentation, he states that he was diaphoretic, nauseous, and leg tingling. Patient is a poor historian, but per family he passed out briefly for a few seconds and had a heart rate of 180s. EMS was called, who defibrillated patient at his home, no strips available. Patient taken to [facility], loaded with ASA. Patient left AMA because he wanted to be seen at [facility].    On arrival, EKG with ST depressions in lateral leads, LVH, ST elevations in anterior leads not meeting STEMI criteria. Not complaining of chest pain. Troponin 440 -> 414. Pro-BNP 4168. Creatinine 1.25, AST/ALT 94/50.     [facility] 2017:  LM:   --  LM: Normal.  LAD:   --  Proximal LAD: There was a 50 % stenosis.  CX:   --  Circumflex: Normal.  RCA:   --  Mid RCA: There was a 40 % stenosis.  --  Distal RCA: There was a 50 % stenosis.      Cardiac meds:  coreg 25, HCTZ 25, losartan 25, plavix.     PMHx:   HTN (hypertension)    CAD (coronary artery disease)    Intracranial hemorrhage    Respiratory arrest    Myocardial infarction, unspecified MI type, unspecified artery        PSHx:   History of coronary artery stent placement        Allergies:  penicillins (Unknown)  shellfish (Angioedema)  contrast dye (Short breath)      Current Medications:   heparin   Injectable 5300 Unit(s) IV Push every 6 hours PRN  heparin  Infusion.  Unit(s)/Hr IV Continuous <Continuous>      FAMILY HISTORY:  Family history of meningitis (Sibling)  Brother, death at age 49 from complications of infection (June 2015)    Family history of hypertension in mother  Mother    REVIEW OF SYSTEMS:  CONSTITUTIONAL: No weakness, fevers or chills  EYES/ENT: No visual changes;  No dysphagia  NECK: No pain or stiffness  RESPIRATORY: No cough, wheezing, hemoptysis; No shortness of breath  CARDIOVASCULAR: No chest pain or palpitations; No lower extremity edema  GASTROINTESTINAL: No abdominal or epigastric pain. No nausea, vomiting, or hematemesis; No diarrhea or constipation. No melena or hematochezia.  BACK: No back pain  GENITOURINARY: No dysuria, frequency or hematuria  NEUROLOGICAL: No numbness or weakness  SKIN: No itching, burning, rashes, or lesions   All other review of systems is negative unless indicated above.    Physical Exam:  T(F): 98.2 (11-01), Max: 98.2 (11-01)  HR: 77 (11-01) (77 - 87)  BP: 126/89 (11-01) (115/95 - 129/98)  RR: 17 (11-01)  SpO2: 95% (11-01)  GEN: NAD  HEENT: EOMI, clear sclera  PULM: crackles at bases  CV: RRR S1 S2, no murmur, no JVD  ABD: S, NT, ND  EXT: WWP, no edema  PSYCH: normal affect  SKIN: No rash    CXR: Personally reviewed    Labs: Personally reviewed                        14.7   8.24  )-----------( 173      ( 01 Nov 2023 06:14 )             45.0     11-01    137  |  100  |  27<H>  ----------------------------<  233<H>  4.1   |  22  |  1.25    Ca    9.2      01 Nov 2023 06:14  Mg     2.1     11-01    TPro  6.7  /  Alb  3.8  /  TBili  0.3  /  DBili  x   /  AST  94<H>  /  ALT  50<H>  /  AlkPhos  49  11-01    PT/INR - ( 01 Nov 2023 06:14 )   PT: 13.0 sec;   INR: 1.25 ratio         PTT - ( 01 Nov 2023 06:14 )  PTT:26.5 sec    CARDIAC MARKERS ( 01 Nov 2023 07:51 )  414 ng/L / x     / x     / x     / x     / x      CARDIAC MARKERS ( 01 Nov 2023 06:14 )  440 ng/L / x     / x     / x     / x     / x                     Josh Yoon MD  Cardiology Fellow  [phone]  All Cardiology service information can be found 24/7 on [email or web address], password: [email or web address]    Patient seen and evaluated at bedside    HPI:  58M with HTN, CAD (s/p PCI 2008), ICH 2008, HFrEF (EF severely reduced 2018) presenting with chest pressure. He notes that he was feeling SOB for the last week, was told he had bilateral pneumonia and started on antibiotics. Has been coughing and having intermittent chest pressure. On the night of presentation, he states that he was diaphoretic, nauseous, and leg tingling. Patient is a poor historian, but per family he passed out briefly for a few seconds and had a heart rate of 180s. EMS was called, who defibrillated patient at his home, no strips available. Patient taken to [facility], loaded with ASA. Patient left AMA because he wanted to be seen at [facility].    On arrival, EKG with ST depressions in lateral leads, LVH, ST elevations in anterior leads not meeting STEMI criteria. Not complaining of chest pain. Troponin 440 -> 414. Pro-BNP 4168. Creatinine 1.25, AST/ALT 94/50.     [facility] 2017:  LM:   --  LM: Normal.  LAD:   --  Proximal LAD: There was a 50 % stenosis.  CX:   --  Circumflex: Normal.  RCA:   --  Mid RCA: There was a 40 % stenosis.  --  Distal RCA: There was a 50 % stenosis.      Cardiac meds:  coreg 25, HCTZ 25, losartan 25, plavix.     PMHx:   HTN (hypertension)    CAD (coronary artery disease)    Intracranial hemorrhage    Respiratory arrest    Myocardial infarction, unspecified MI type, unspecified artery        PSHx:   History of coronary artery stent placement        Allergies:  penicillins (Unknown)  shellfish (Angioedema)  contrast dye (Short breath)      Current Medications:   heparin   Injectable 5300 Unit(s) IV Push every 6 hours PRN  heparin  Infusion.  Unit(s)/Hr IV Continuous <Continuous>      FAMILY HISTORY:  Family history of meningitis (Sibling)  Brother, death at age 49 from complications of infection (June 2015)    Family history of hypertension in mother  Mother    REVIEW OF SYSTEMS:  CONSTITUTIONAL: No weakness, fevers or chills  EYES/ENT: No visual changes;  No dysphagia  NECK: No pain or stiffness  RESPIRATORY: No cough, wheezing, hemoptysis; No shortness of breath  CARDIOVASCULAR: No chest pain or palpitations; No lower extremity edema  GASTROINTESTINAL: No abdominal or epigastric pain. No nausea, vomiting, or hematemesis; No diarrhea or constipation. No melena or hematochezia.  BACK: No back pain  GENITOURINARY: No dysuria, frequency or hematuria  NEUROLOGICAL: No numbness or weakness  SKIN: No itching, burning, rashes, or lesions   All other review of systems is negative unless indicated above.    Physical Exam:  T(F): 98.2 (11-01), Max: 98.2 (11-01)  HR: 77 (11-01) (77 - 87)  BP: 126/89 (11-01) (115/95 - 129/98)  RR: 17 (11-01)  SpO2: 95% (11-01)  GEN: NAD  HEENT: EOMI, clear sclera  PULM: crackles at bases  CV: RRR S1 S2, no murmur, no JVD  ABD: S, NT, ND  EXT: WWP, no edema  PSYCH: normal affect  SKIN: No rash    CXR: Personally reviewed    Labs: Personally reviewed                        14.7   8.24  )-----------( 173      ( 01 Nov 2023 06:14 )             45.0     11-01    137  |  100  |  27<H>  ----------------------------<  233<H>  4.1   |  22  |  1.25    Ca    9.2      01 Nov 2023 06:14  Mg     2.1     11-01    TPro  6.7  /  Alb  3.8  /  TBili  0.3  /  DBili  x   /  AST  94<H>  /  ALT  50<H>  /  AlkPhos  49  11-01    PT/INR - ( 01 Nov 2023 06:14 )   PT: 13.0 sec;   INR: 1.25 ratio         PTT - ( 01 Nov 2023 06:14 )  PTT:26.5 sec    CARDIAC MARKERS ( 01 Nov 2023 07:51 )  414 ng/L / x     / x     / x     / x     / x      CARDIAC MARKERS ( 01 Nov 2023 06:14 )  440 ng/L / x     / x     / x     / x     / x                     Josh Yoon MD  Cardiology Fellow  [phone]  All Cardiology service information can be found 24/7 on [email or web address], password: [email or web address]    Patient seen and evaluated at bedside    HPI:  58M with HTN, CAD (s/p PCI 2008), ICH 2008, HFrEF (EF severely reduced 2018) presenting with chest pressure. He notes that he was feeling SOB for the last week, was told he had bilateral pneumonia and started on antibiotics. Has been coughing and having intermittent chest pressure. On the night of presentation, he states that he was diaphoretic, nauseous, and leg tingling. Patient is a poor historian, but per family he passed out briefly for a few seconds and had a heart rate of 180s. EMS was called, who defibrillated patient at his home, no strips available. Patient taken to [facility], loaded with ASA. Patient left AMA because he wanted to be seen at [facility].    On arrival, EKG with ST depressions in lateral leads, LVH, ST elevations in anterior leads not meeting STEMI criteria. Not complaining of chest pain. Troponin 440 -> 414. Pro-BNP 4168. Creatinine 1.25, AST/ALT 94/50.     [facility] 2017:  LM:   --  LM: Normal.  LAD:   --  Proximal LAD: There was a 50 % stenosis.  CX:   --  Circumflex: Normal.  RCA:   --  Mid RCA: There was a 40 % stenosis.  --  Distal RCA: There was a 50 % stenosis.      Cardiac meds:  coreg 25, HCTZ 25, losartan 25, plavix.     PMHx:   HTN (hypertension)    CAD (coronary artery disease)    Intracranial hemorrhage    Respiratory arrest    Myocardial infarction, unspecified MI type, unspecified artery        PSHx:   History of coronary artery stent placement        Allergies:  penicillins (Unknown)  shellfish (Angioedema)  contrast dye (Short breath)      Current Medications:   heparin   Injectable 5300 Unit(s) IV Push every 6 hours PRN  heparin  Infusion.  Unit(s)/Hr IV Continuous <Continuous>      FAMILY HISTORY:  Family history of meningitis (Sibling)  Brother, death at age 49 from complications of infection (June 2015)    Family history of hypertension in mother  Mother    REVIEW OF SYSTEMS:  CONSTITUTIONAL: No weakness, fevers or chills  EYES/ENT: No visual changes;  No dysphagia  NECK: No pain or stiffness  RESPIRATORY: No cough, wheezing, hemoptysis; No shortness of breath  CARDIOVASCULAR: No chest pain or palpitations; No lower extremity edema  GASTROINTESTINAL: No abdominal or epigastric pain. No nausea, vomiting, or hematemesis; No diarrhea or constipation. No melena or hematochezia.  BACK: No back pain  GENITOURINARY: No dysuria, frequency or hematuria  NEUROLOGICAL: No numbness or weakness  SKIN: No itching, burning, rashes, or lesions   All other review of systems is negative unless indicated above.    Physical Exam:  T(F): 98.2 (11-01), Max: 98.2 (11-01)  HR: 77 (11-01) (77 - 87)  BP: 126/89 (11-01) (115/95 - 129/98)  RR: 17 (11-01)  SpO2: 95% (11-01)  GEN: NAD  HEENT: EOMI, clear sclera  PULM: crackles at bases  CV: RRR S1 S2, no murmur, no JVD  ABD: S, NT, ND  EXT: WWP, no edema  PSYCH: normal affect  SKIN: No rash    CXR: Personally reviewed    Labs: Personally reviewed                        14.7   8.24  )-----------( 173      ( 01 Nov 2023 06:14 )             45.0     11-01    137  |  100  |  27<H>  ----------------------------<  233<H>  4.1   |  22  |  1.25    Ca    9.2      01 Nov 2023 06:14  Mg     2.1     11-01    TPro  6.7  /  Alb  3.8  /  TBili  0.3  /  DBili  x   /  AST  94<H>  /  ALT  50<H>  /  AlkPhos  49  11-01    PT/INR - ( 01 Nov 2023 06:14 )   PT: 13.0 sec;   INR: 1.25 ratio         PTT - ( 01 Nov 2023 06:14 )  PTT:26.5 sec    CARDIAC MARKERS ( 01 Nov 2023 07:51 )  414 ng/L / x     / x     / x     / x     / x      CARDIAC MARKERS ( 01 Nov 2023 06:14 )  440 ng/L / x     / x     / x     / x     / x

## 2023-11-01 NOTE — CONSULT NOTE ADULT - ASSESSMENT
58M with HTN, CAD (s/p PCI 2008), ICH 2008, HFrEF (EF severely reduced 2018), DM presenting with chest pressure and an unclear tachycardia that was shocked x1. History is concerning for ACS, although potentially late presentation with possible VT (although no strips available). Will manage as ACS at this time, will need a left heart catheterization. Also unclear why patient is not being followed by GDMT with the most recent echo showing severe LV dysfunction.     Recs:  -Obtain TTE  -continue ASA/brilinta, heparin gtt  -plan for [facility] today  -hold home losartan, HCTZ, coreg for now  -please obtain collateral from [facility] and prior EKGs and strips 58M with HTN, CAD (s/p PCI 2008), ICH 2008, HFrEF (EF severely reduced 2018), DM presenting with chest pressure and an unclear tachycardia that was shocked x1. History is concerning for ACS, although potentially late presentation with possible VT (although no strips available). Will manage as ACS at this time, will need a left heart catheterization. Also unclear why patient is not being followed by GD[facility] with the most recent echo showing severe LV dysfunction.     Recs:  -Obtain TTE  -continue ASA/brilinta, heparin gtt  -plan for [facility] today  -hold home losartan, HCTZ, coreg for now  -please obtain collateral from [facility] and prior EKGs and strips 58M with HTN, CAD (s/p PCI 2008), ICH 2008, HFrEF (EF severely reduced 2018), DM presenting with chest pressure and an unclear tachycardia that was shocked x1. History is concerning for ACS, although potentially late presentation with possible VT (although no strips available). Will manage as ACS at this time, will need a left heart catheterization. Also unclear why patient is not being followed by [facility] with the most recent echo showing severe LV dysfunction.     Recs:  -Obtain TTE  -continue ASA/brilinta, heparin gtt  -plan for [facility] today  -hold home losartan, HCTZ, coreg for now  -please obtain collateral from [facility] and prior EKGs and strips 58M with HTN, CAD (s/p PCI 2008), HFrEF (EF severely reduced 2018), CVA 2018 (requiring tpA), DM presenting with chest pressure and an unclear tachycardia that was shocked x1. History is concerning for ACS, although potentially late presentation with possible VT (although no strips available). Will manage as ACS at this time, will need a left heart catheterization. Also unclear why patient is not being followed by GD[facility] with the most recent echo showing severe LV dysfunction.     Recs:  -Obtain TTE  -continue ASA/brilinta, heparin gtt  -plan for [facility] today  -hold home losartan, HCTZ, coreg for now  -please obtain collateral from [facility] and prior EKGs and strips 58M with HTN, CAD (s/p PCI 2008), HFrEF (EF severely reduced 2018), CVA 2018 (requiring tpA), DM presenting with chest pressure and an unclear tachycardia that was shocked x1. History is concerning for ACS, although potentially late presentation with possible VT (although no strips available). Will manage as ACS at this time, will need a left heart catheterization. Also unclear why patient is not being followed by [facility] with the most recent echo showing severe LV dysfunction.     Recs:  -Obtain TTE  -continue ASA/brilinta, heparin gtt  -plan for [facility] today  -hold home losartan, HCTZ, coreg for now  -please obtain collateral from [facility] and prior EKGs and strips 58M with HTN, CAD (s/p PCI 2008), HFrEF (EF severely reduced 2018), CVA 2018 (requiring tpA), DM presenting with chest pressure and an unclear tachycardia that was shocked x1. History is concerning for ACS, although potentially late presentation with possible VT (although no strips available). Will manage as ACS at this time, will need a left heart catheterization. Also unclear why patient is not being followed by [facility] with the most recent echo showing severe LV dysfunction.     Recs:  -Obtain TTE  -continue ASA/brilinta, heparin gtt  -plan for [facility] today  -hold home losartan, HCTZ, coreg for now  -please obtain collateral from [facility] and prior EKGs and strips 58M with HTN, CAD (s/p PCI 2008), HFrEF (EF severely reduced 2018), CVA 2018 (requiring tpA), DM presenting with chest pressure and an unclear tachycardia that was shocked x1. History is concerning for ACS, although potentially late presentation with possible VT (although no strips available). Will manage as ACS at this time, will need a left heart catheterization. Also unclear why patient is not being followed by [facility] with the most recent echo showing severe LV dysfunction. Most recent LHC in 2017 with 50% pLAD, 40% mRCA, 50% dRCA disease, medically managed.    Recs:  -Obtain TTE  -continue ASA/brilinta, heparin gtt  -plan for [facility] today  -hold home losartan, HCTZ, coreg for now  -please obtain collateral from [facility] and prior EKGs and strips 58M with HTN, CAD (s/p PCI 2008), HFrEF (EF severely reduced 2018), CVA 2018 (requiring tpA), DM presenting with chest pressure and an unclear tachycardia that was shocked x1. History is concerning for ACS, although potentially late presentation with possible VT (although no strips available). Will manage as ACS at this time, will need a left heart catheterization. Also unclear why patient is not being followed by [facility] with the most recent echo showing severe LV dysfunction. Most recent LHC in 2017 with 50% pLAD, 40% mRCA, 50% dRCA disease, medically managed.    Recs:  -Obtain TTE  -continue ASA/brilinta, heparin gtt  -plan for [facility] today  -hold home losartan, HCTZ, coreg for now  -please obtain collateral from [facility] and prior EKGs and strips 58M with HTN, CAD (s/p PCI 2008), HFrEF (EF severely reduced 2018), CVA 2018 (requiring tpA), DM presenting with chest pressure and an unclear tachycardia that was shocked x1. History is concerning for ACS, although potentially late presentation with possible VT (although no strips available). Will manage as ACS at this time, will need a left heart catheterization. Also unclear why patient is not being followed by [facility] with the most recent echo showing severe LV dysfunction. Most recent LHC in 2017 with 50% pLAD, 40% mRCA, 50% dRCA disease, medically managed.    Recs:  -Obtain TTE  -continue ASA/brilinta, heparin gtt  -plan for [facility] today  -hold home losartan, HCTZ, coreg for now  -please obtain collateral from [facility] and prior EKGs and strips

## 2023-11-01 NOTE — ED ADULT NURSE NOTE - OBJECTIVE STATEMENT
Pt is 58Y, pmhx CAD, CVA no residuals, multiple stents on plavix, HTN, HLD, DM2, anxiety, c/o chest pain for 4 days, pt recent dx PNA, at [facility] this evening, AMA per pt bc he sees [facility] cardiologist, pt told he has PNA, possible STEMI, pt placed on cardiac monitor-NSR, pt EKG at bedside with MD Abdullahi at bedside, pt states he has had fever, chest pain, cough for few days, pt denies any pain currently, pt denies all other symptoms, pt A&Ox4, ambulatory, updated on plan of cvare

## 2023-11-01 NOTE — ED ADULT NURSE NOTE - NS ED NURSE RECORD ANOTHER VITAL SIGN
I was unable to contact patient  after Patient has no voicemail setup. to discuss normal labs, Letter Sent.   Yes

## 2023-11-01 NOTE — ED PROVIDER NOTE - OBJECTIVE STATEMENT
59yo M w/ hx HTN, CAD w/ 1 stent, ICH (2008) presenting with abn ekg. Patient presented to [facility] where he was found to have STEMI, recommended to get cath however patient did not want to get it there so it left and came here.  Patient initially had cough, congestion, fever, was placed on antibiotics on Sunday.  Started feeling nauseous and had a presyncopal event after which he presented to ED last night.  Had chest pain as well.  Chest pain is midsternal.  Not currently having chest pain.  Received 4 aspirin 30 min pta.

## 2023-11-01 NOTE — CONSULT NOTE ADULT - PROBLEM SELECTOR RECOMMENDATION 9
cardiac care as per CICU team  Can consider viability but probably not good option for PCI  Would launch advanced therapies evaluation cardiac care as per CICU team  Can consider viability but probably not good option for PCI and very high risk for surgical revascularization without clear targets and poor LV function that has been consistently depressed in the LAD territory since 2018   Would launch advanced therapies evaluation

## 2023-11-01 NOTE — H&P ADULT - HISTORY OF PRESENT ILLNESS
57yo M w/ hx HTN, CAD w/ 1 stent in 2009, ICH (2008) presenting with abn ekg. Patient presented to [facility] where he was found to have STEMI, recommended to get cath however patient did not want to get it there so it left and came here.  Patient initially had cough, congestion, fever, was placed on antibiotics on Sunday.  Started feeling nauseous and had a presyncopal event after which he presented to ED last night.  Had chest pain as well.  Chest pain is midsternal.  Not currently having chest pain.  Received 4 aspirin 30 min pta. pt seen and approx 1:30 pm  in [facility]    59yo M w/ hx HTN, CAD w/ 1 stent in 2009, ICH (2008) presenting with abn ekg. Patient presented to [facility] where he was found to have STEMI, recommended to get cath however patient did not want to get it there so it left and came here.  Patient initially had cough, congestion, fever, was placed on antibiotics on Sunday.  Started feeling nauseous and had a presyncopal event after which he presented to ED last night.  Had chest pain as well.  Chest pain is midsternal.  Not currently having chest pain.  Received 4 aspirin 30 min pta.

## 2023-11-01 NOTE — ED PROVIDER NOTE - ATTENDING CONTRIBUTION TO CARE
MD Hernandez:  patient seen and evaluated personally.   I agree with the History & Physical,  Impression & Plan other than what was detailed in my note.  MD Hernandez   M with Hx of HTN, CAD, ICH (2008) no deficits, 1 stent states he signed out AMA from [facility] with a stemi. Pt states that he has had chest pain intermittently, associated w/ sweating, palpitations, n/v, has not had cp like this before, currently feels asymptomatic, he was ligthheaded but did not have syncope, no palpitations, afebrile vitals stable  non toxic well appearing, NC/AT,  conjunctiva non conjected, sclera anicteric, moist mucous membranes, neck supple, heart sounds, normal, no mrg, lungs cta b/l no wrr, abd soft non distended w/ no tenderness, no visual deformities of extremities, axox3, , normal mood and affect, pt is non toxic, appears anxious, ekg shows lvh w/ no sig st elevations noted on my read, will get repeat ekg when pt is settled, cbc cmp, trop, cxr, pt received asa. weight.

## 2023-11-01 NOTE — CHART NOTE - NSCHARTNOTEFT_GEN_A_CORE
CICU DOWN GRADE NOTE      Patient is a 58y old  Male who presents with a chief complaint of     HPI:     58M with HTN, CAD (s/p PCI 2008), ICH, CVA 2008, HFrEF (EF severely reduced 2018), asthma (required intubation in 2018), DM presenting with chest pressure. He notes that he was feeling SOB for the last week, was told he had bilateral pneumonia and started on antibiotics. Has been coughing and having intermittent chest pressure. On the night of presentation, he states that he was diaphoretic, nauseous, and leg tingling. Patient is a poor historian, but per family he passed out briefly for a few seconds and had a heart rate of 180s. EMS was called, who defibrillated patient at his home, no strips available. Patient taken to [facility], loaded with ASA. Patient left AMA because he wanted to be seen at [facility].    On arrival, EKG with ST depressions in lateral leads, LVH, ST elevations in anterior leads not meeting STEMI criteria. Not complaining of chest pain. Troponin 440 -> 414. Pro-BNP 4168. Creatinine 1.25, AST/ALT 94/50.     C 2017:  LM:   --  LM: Normal.  LAD:   --  Proximal LAD: There was a 50 % stenosis.  CX:   --  Circumflex: Normal.  RCA:   --  Mid RCA: There was a 40 % stenosis.  --  Distal RCA: There was a 50 % stenosis.        INTERVAL HPI/OVERNIGHT EVENTS: Prior to undergoing cath, pt was dyspneic and diaphoretic so was intubated. LHC showed pLAD 70 % stenosis in the ostium portion of the segment and 100 % in-stent restenosis and in mRCA, 100 % stenosis. Also found to have elevated R atrial pressure (mRA 23mmHg with a V wave of 24mmHg), moderate post-capillary pulmonary hypertension (sPAP 52mmHg, dPAP 33mmHg, mPAP 40mmHg), PCWP = 30mmHg with a V wave of 33mmHg, AO = 85/66/73. IABP was placed during the cath through R common femoral artery.         REVIEW OF SYSTEMS:  CONSTITUTIONAL: No fever, chills  HEENT:  No blurry vision No sinus or throat pain  NECK: No pain or stiffness  RESPIRATORY: No cough, wheezing, chills or hemoptysis; No shortness of breath  CARDIOVASCULAR: No chest pain, palpitations  GASTROINTESTINAL: No abdominal pain. No nausea, vomiting, or diarrhea  GENITOURINARY: No dysuria  NEUROLOGICAL: No HA, No focal weakness  SKIN: No itching, burning, rashes, or lesions   MUSCULOSKELETAL: No joint pain or swelling; No muscle, back, or extremity pain    MEDICATIONS:  furosemide   Injectable 40 milliGRAM(s) IV Push once  labetalol Injectable 20 milliGRAM(s) IV Push once  levoFLOXacin  Tablet 500 milliGRAM(s) Oral every 24 hours  midazolam Injectable 2 milliGRAM(s) IV Push once      T(C): 36.6 (11-01-23 @ 12:26), Max: 36.8 (11-01-23 @ 10:00)  HR: 88 (11-01-23 @ 12:26) (77 - 88)  BP: 127/82 (11-01-23 @ 12:26) (115/95 - 129/98)  RR: 18 (11-01-23 @ 12:26) (14 - 19)  SpO2: 97% (11-01-23 @ 12:26) (95% - 97%)  Wt(kg): --Vital Signs Last 24 Hrs  T(C): 36.6 (01 Nov 2023 12:26), Max: 36.8 (01 Nov 2023 10:00)  T(F): 97.9 (01 Nov 2023 12:26), Max: 98.2 (01 Nov 2023 10:00)  HR: 88 (01 Nov 2023 12:26) (77 - 88)  BP: 127/82 (01 Nov 2023 12:26) (115/95 - 129/98)  BP(mean): 100 (01 Nov 2023 12:26) (100 - 101)  RR: 18 (01 Nov 2023 12:26) (14 - 19)  SpO2: 97% (01 Nov 2023 12:26) (95% - 97%)    Parameters below as of 01 Nov 2023 12:26  Patient On (Oxygen Delivery Method): room air        PHYSICAL EXAM:  GENERAL: NAD, well-groomed, well-developed  HEAD:  Atraumatic, Normocephalic  EYES: EOMI, PERRLA, conjunctiva and sclera clear  ENMT:  Moist mucous membranes  NECK: Supple, No JVD,  CHEST/LUNG: Clear to auscultation bilaterally; No rales, rhonchi, wheezing, or rubs  HEART: Regular rate and rhythm; No murmurs, rubs, or gallops  ABDOMEN: Soft, Nontender, Nondistended; Bowel sounds present  NEURO: Alert & Oriented X3  EXTREMITIES: No LE edema, no calf tenderness  LYMPH: No lymphadenopathy noted  SKIN: No rashes or lesions    Consultant(s) Notes Reviewed:  [x ] YES  [ ] NO  Care Discussed with Consultants/Other Providers [ x] YES  [ ] NO    LABS:                        14.7   8.24  )-----------( 173      ( 01 Nov 2023 06:14 )             45.0     11-01    137  |  100  |  27<H>  ----------------------------<  233<H>  4.1   |  22  |  1.25    Ca    9.2      01 Nov 2023 06:14  Mg     2.1     11-01    TPro  6.7  /  Alb  3.8  /  TBili  0.3  /  DBili  x   /  AST  94<H>  /  ALT  50<H>  /  AlkPhos  49  11-01    PT/INR - ( 01 Nov 2023 06:14 )   PT: 13.0 sec;   INR: 1.25 ratio         PTT - ( 01 Nov 2023 06:14 )  PTT:26.5 sec  Urinalysis Basic - ( 01 Nov 2023 06:14 )    Color: x / Appearance: x / SG: x / pH: x  Gluc: 233 mg/dL / Ketone: x  / Bili: x / Urobili: x   Blood: x / Protein: x / Nitrite: x   Leuk Esterase: x / RBC: x / WBC x   Sq Epi: x / Non Sq Epi: x / Bacteria: x      CAPILLARY BLOOD GLUCOSE          ABG - ( 01 Nov 2023 15:06 )  pH, Arterial: 7.24  pH, Blood: x     /  pCO2: 44    /  pO2: 183   / HCO3: 19    / Base Excess: -8    /  SaO2: 99.8              Urinalysis Basic - ( 01 Nov 2023 06:14 )    Color: x / Appearance: x / SG: x / pH: x  Gluc: 233 mg/dL / Ketone: x  / Bili: x / Urobili: x   Blood: x / Protein: x / Nitrite: x   Leuk Esterase: x / RBC: x / WBC x   Sq Epi: x / Non Sq Epi: x / Bacteria: x        RADIOLOGY & ADDITIONAL TESTS:    < from: Cardiac Catheterization (11.01.23 @ 14:34) >      Diagnostic Findings:     Coronary Angiography   The coronary circulation is right dominant.      LM   Left main artery: There is a 20 % stenosis.      LAD   Proximal left anterior descending: There is a 70 % stenosis in the  ostium portion of the segment. Proximal left anterior  descending: There is a 100 % in-stent restenosis.      CX   Proximal circumflex: There is a 55 % stenosis. Mid circumflex: There  is a 50 % stenosis. First obtuse marginal: There is a 50  % stenosis.      RCA   Mid right coronary artery: There is a 100 % stenosis.        < end of copied text >        Imaging Personally Reviewed:  [x ] YES  [ ] NO    ASSESSMENT & PLAN:    57 yo M with HTN, CAD (s/p PCI 2008), HFrEF (EF severely reduced 2018) not on GDMT, CVA 2018 (requiring tpA), DM presenting with chest pressure and unknown tachycardia that was shocked x1, [facility] 11/1 found to have chronic total occlusion of LAD and RCA, with elevated RA and PA pressures, echo 11/1 w/ severely decreased EF 32%, s/p IABP 11/1, likely in cardiogenic shock 2/2 acute on chronic HF s/p unknown shockable rhythm.    NEURO:  #Mental status: intubated?      CV:  #Cardiogenic Shock requiring IABP (11/1-) s/p unknown shockable event due to ischemia?  TTE 11/1: LV moderately dilated. EF 32 %. Regional wall motion abnormalities present, mod (grade 2) LV diastolic dysfunction, with indeterminant filling pressure.   -per echo areas of akinesis are not new compared to 2017, but EF is severely decreased   -currently on IABP 1:1, with augmentation of  -daily CXR for IABP  -Keep right leg straight while sheaths/IABP/Reading are in position   -consider starting inotropic support    #STEMI at OSH  -Trop 440-> 414  -EKG here w/ LBBB  -[facility] 11/1: pLAD 70 % stenosis in the ostium portion of the segment and 100 % in-stent restenosis. mRCA, 100 % stenosis. RA 23, PA 51/33, wedge 31, PA sat 54%, CI 1.5  -s/p IABP  -c/w ASA, brillinta (takes plavix at home?)  -start heparin gtt  -TTE 11/1: severely reduced EF, no new areas of akinesis  -f/u regarding intervention when stable    #HFrEF w/ severely reduced EF 32%  TTE 11/1: LV moderately dilated. EF 32 %. Regional wall motion abnormalities present, mod (grade 2) LV diastolic dysfunction, with indeterminant filling pressure.   -s/p lasix 40  -continue management of shock as above  -start GDMT when stable  - Pt appears clinically hypervolemic, likely cause of SoB  - BNP 4168 on admission  - CXR 11/1 clear   - tele  - Start Lasix 40mg qd  - Strict I&O  - Daily weights  - Fluid restriction  - on ozempic and dapaglifozin at home      #HTN  -hold home losartan 25, HCTZ 25, coreg 25 bid for now    PULM:  #PNA  Pt initially had cough, congestion, fever, was placed on antibiotics on Juan 10/29  -CXR here clear  -on levaquin (10/29-  -if afebrile, remains w/o leukocytosis, consider finishing 5 day course  -monitor QTc on levaquin    #Asthma (hx of respiratory failure in 2018- intubated for 20 minutes per chart review pt report)  -takes montelukast, trelegy ellipa, albuterol at home    RENAL:  Baseline Cr: 1-1.22  -trend Cr    #Metabolic acidosis  Likely 2/2 lactate of 3.7, due to cardiogenic shock  -trend lactate, blood gas  -IABP, consider adding inotropic support    GI:  #Transaminitis  Likely iso cardiogenic shock/abnormal shockable event  AST/ALT: 94/50 on admission  -trend LFTs    #Diet: NPO    #Bowel regimen:     ENDO:  #Type 2 DM  f/u A1c  -TSH    METABOLIC:  #Electrolyte abnormalities    HEMATOLOGIC:  H/H stable    #DVT prophylaxis with heparin    ID:  #PNA  -plan as above  -c/w levaquin for 5 day course if afebrile/no leuks    SKIN:  #Lines: IABP (11/1- CICU ACCEPT NOTE      Patient is a 58y old  Male who presents with a chief complaint of chest pain    HPI:     58M with HTN, CAD (s/p PCI 2008), ICH, CVA 2008, HFrEF (EF severely reduced 2018), asthma (required intubation in 2018), DM presenting with chest pressure. He notes that he was feeling SOB for the last week, was told he had bilateral pneumonia and started on antibiotics. Has been coughing and having intermittent chest pressure. On the night of presentation, he states that he was diaphoretic, nauseous, and leg tingling. Patient is a poor historian, but per family he passed out briefly for a few seconds and had a heart rate of 180s. EMS was called, who defibrillated patient at his home, no strips available. Patient taken to [facility], loaded with ASA. Patient left AMA because he wanted to be seen at [facility].    On arrival, EKG with ST depressions in lateral leads, LVH, ST elevations in anterior leads not meeting STEMI criteria. Not complaining of chest pain. Troponin 440 -> 414. Pro-BNP 4168. Creatinine 1.25, AST/ALT 94/50.     C 2017:  LM:   --  LM: Normal.  LAD:   --  Proximal LAD: There was a 50 % stenosis.  CX:   --  Circumflex: Normal.  RCA:   --  Mid RCA: There was a 40 % stenosis.  --  Distal RCA: There was a 50 % stenosis.        INTERVAL HPI/OVERNIGHT EVENTS: Prior to undergoing cath, pt was dyspneic and diaphoretic so was intubated. LHC showed pLAD 70 % stenosis in the ostium portion of the segment and 100 % in-stent restenosis and in mRCA, 100 % stenosis. Also found to have elevated R atrial pressure (mRA 23mmHg with a V wave of 24mmHg), moderate post-capillary pulmonary hypertension (sPAP 52mmHg, dPAP 33mmHg, mPAP 40mmHg), PCWP = 30mmHg with a V wave of 33mmHg, AO = 85/66/73. IABP was placed during the cath through R common femoral artery.         REVIEW OF SYSTEMS:  CONSTITUTIONAL: No fever, chills  HEENT:  No blurry vision No sinus or throat pain  NECK: No pain or stiffness  RESPIRATORY: No cough, wheezing, chills or hemoptysis; No shortness of breath  CARDIOVASCULAR: No chest pain, palpitations  GASTROINTESTINAL: No abdominal pain. No nausea, vomiting, or diarrhea  GENITOURINARY: No dysuria  NEUROLOGICAL: No HA, No focal weakness  SKIN: No itching, burning, rashes, or lesions   MUSCULOSKELETAL: No joint pain or swelling; No muscle, back, or extremity pain    MEDICATIONS:  furosemide   Injectable 40 milliGRAM(s) IV Push once  labetalol Injectable 20 milliGRAM(s) IV Push once  levoFLOXacin  Tablet 500 milliGRAM(s) Oral every 24 hours  midazolam Injectable 2 milliGRAM(s) IV Push once      T(C): 36.6 (11-01-23 @ 12:26), Max: 36.8 (11-01-23 @ 10:00)  HR: 88 (11-01-23 @ 12:26) (77 - 88)  BP: 127/82 (11-01-23 @ 12:26) (115/95 - 129/98)  RR: 18 (11-01-23 @ 12:26) (14 - 19)  SpO2: 97% (11-01-23 @ 12:26) (95% - 97%)  Wt(kg): --Vital Signs Last 24 Hrs  T(C): 36.6 (01 Nov 2023 12:26), Max: 36.8 (01 Nov 2023 10:00)  T(F): 97.9 (01 Nov 2023 12:26), Max: 98.2 (01 Nov 2023 10:00)  HR: 88 (01 Nov 2023 12:26) (77 - 88)  BP: 127/82 (01 Nov 2023 12:26) (115/95 - 129/98)  BP(mean): 100 (01 Nov 2023 12:26) (100 - 101)  RR: 18 (01 Nov 2023 12:26) (14 - 19)  SpO2: 97% (01 Nov 2023 12:26) (95% - 97%)    Parameters below as of 01 Nov 2023 12:26  Patient On (Oxygen Delivery Method): room air        PHYSICAL EXAM:  GENERAL: NAD, well-groomed, well-developed  HEAD:  Atraumatic, Normocephalic  EYES: EOMI, PERRLA, conjunctiva and sclera clear  ENMT:  Moist mucous membranes  NECK: Supple, No JVD,  CHEST/LUNG: Clear to auscultation bilaterally; No rales, rhonchi, wheezing, or rubs  HEART: Regular rate and rhythm; No murmurs, rubs, or gallops  ABDOMEN: Soft, Nontender, Nondistended; Bowel sounds present  NEURO: Alert & Oriented X3  EXTREMITIES: No LE edema, no calf tenderness  LYMPH: No lymphadenopathy noted  SKIN: No rashes or lesions    Consultant(s) Notes Reviewed:  [x ] YES  [ ] NO  Care Discussed with Consultants/Other Providers [ x] YES  [ ] NO    LABS:                        14.7   8.24  )-----------( 173      ( 01 Nov 2023 06:14 )             45.0     11-01    137  |  100  |  27<H>  ----------------------------<  233<H>  4.1   |  22  |  1.25    Ca    9.2      01 Nov 2023 06:14  Mg     2.1     11-01    TPro  6.7  /  Alb  3.8  /  TBili  0.3  /  DBili  x   /  AST  94<H>  /  ALT  50<H>  /  AlkPhos  49  11-01    PT/INR - ( 01 Nov 2023 06:14 )   PT: 13.0 sec;   INR: 1.25 ratio         PTT - ( 01 Nov 2023 06:14 )  PTT:26.5 sec  Urinalysis Basic - ( 01 Nov 2023 06:14 )    Color: x / Appearance: x / SG: x / pH: x  Gluc: 233 mg/dL / Ketone: x  / Bili: x / Urobili: x   Blood: x / Protein: x / Nitrite: x   Leuk Esterase: x / RBC: x / WBC x   Sq Epi: x / Non Sq Epi: x / Bacteria: x      CAPILLARY BLOOD GLUCOSE          ABG - ( 01 Nov 2023 15:06 )  pH, Arterial: 7.24  pH, Blood: x     /  pCO2: 44    /  pO2: 183   / HCO3: 19    / Base Excess: -8    /  SaO2: 99.8              Urinalysis Basic - ( 01 Nov 2023 06:14 )    Color: x / Appearance: x / SG: x / pH: x  Gluc: 233 mg/dL / Ketone: x  / Bili: x / Urobili: x   Blood: x / Protein: x / Nitrite: x   Leuk Esterase: x / RBC: x / WBC x   Sq Epi: x / Non Sq Epi: x / Bacteria: x        RADIOLOGY & ADDITIONAL TESTS:    < from: Cardiac Catheterization (11.01.23 @ 14:34) >      Diagnostic Findings:     Coronary Angiography   The coronary circulation is right dominant.      LM   Left main artery: There is a 20 % stenosis.      LAD   Proximal left anterior descending: There is a 70 % stenosis in the  ostium portion of the segment. Proximal left anterior  descending: There is a 100 % in-stent restenosis.      CX   Proximal circumflex: There is a 55 % stenosis. Mid circumflex: There  is a 50 % stenosis. First obtuse marginal: There is a 50  % stenosis.      RCA   Mid right coronary artery: There is a 100 % stenosis.        < end of copied text >        Imaging Personally Reviewed:  [x ] YES  [ ] NO    ASSESSMENT & PLAN:    59 yo M with HTN, CAD (s/p PCI 2008), HFrEF (EF severely reduced 2018) not on GDMT, CVA 2018 (requiring tpA), DM presenting with chest pressure and unknown tachycardia that was shocked x1, [facility] 11/1 found to have chronic total occlusion of LAD and RCA, with elevated RA and PA pressures, echo 11/1 w/ severely decreased EF 32%, s/p IABP 11/1, likely in cardiogenic shock 2/2 acute on chronic HF s/p unknown shockable rhythm.    NEURO:  #Mental status: intubated?      CV:  #Cardiogenic Shock requiring IABP (11/1-) s/p unknown shockable event due to ischemia?  TTE 11/1: LV moderately dilated. EF 32 %. Regional wall motion abnormalities present, mod (grade 2) LV diastolic dysfunction, with indeterminant filling pressure.   -per echo areas of akinesis are not new compared to 2017, but EF is severely decreased   -currently on IABP 1:1, with augmentation of  -daily CXR for IABP  -Keep right leg straight while sheaths/IABP/[place] are in position   -consider starting inotropic support    #STEMI at OSH  -Trop 440-> 414  -EKG here w/ LBBB  -[facility] 11/1: pLAD 70 % stenosis in the ostium portion of the segment and 100 % in-stent restenosis. mRCA, 100 % stenosis. RA 23, PA 51/33, wedge 31, PA sat 54%, CI 1.5  -s/p IABP  -c/w ASA, brillinta (takes plavix at home?)  -start heparin gtt  -TTE 11/1: severely reduced EF, no new areas of akinesis  -f/u regarding intervention when stable    #HFrEF w/ severely reduced EF 32%  TTE 11/1: LV moderately dilated. EF 32 %. Regional wall motion abnormalities present, mod (grade 2) LV diastolic dysfunction, with indeterminant filling pressure.   -s/p lasix 40  -continue management of shock as above  -start GDMT when stable  - Pt appears clinically hypervolemic, likely cause of SoB  - BNP 4168 on admission  - CXR 11/1 clear   - tele  - Start Lasix 40mg qd  - Strict I&O  - Daily weights  - Fluid restriction  - on ozempic and dapaglifozin at home      #HTN  -hold home losartan 25, HCTZ 25, coreg 25 bid for now    PULM:  #PNA  Pt initially had cough, congestion, fever, was placed on antibiotics on Juan 10/29  -CXR here clear  -on levaquin (10/29-  -if afebrile, remains w/o leukocytosis, consider finishing 5 day course  -monitor QTc on levaquin    #Asthma (hx of respiratory failure in 2018- intubated for 20 minutes per chart review pt report)  -takes montelukast, trelegy ellipa, albuterol at home    RENAL:  Baseline Cr: 1-1.22  -trend Cr    #Metabolic acidosis  Likely 2/2 lactate of 3.7, due to cardiogenic shock  -trend lactate, blood gas  -IABP, consider adding inotropic support    GI:  #Transaminitis  Likely iso cardiogenic shock/abnormal shockable event  AST/ALT: 94/50 on admission  -trend LFTs    #Diet: NPO    #Bowel regimen:     ENDO:  #Type 2 DM  f/u A1c  -TSH    METABOLIC:  #Electrolyte abnormalities    HEMATOLOGIC:  H/H stable    #DVT prophylaxis with heparin    ID:  #PNA  -plan as above  -c/w levaquin for 5 day course if afebrile/no leuks    SKIN:  #Lines: IABP (11/1-

## 2023-11-01 NOTE — CONSULT NOTE ADULT - SUBJECTIVE AND OBJECTIVE BOX
History of Present Illness:  HPI:  pt seen and approx 1:30 pm  in [facility]    57yo M w/ hx HTN, CAD w/ 1 stent in 2009, ICH (2008) presenting with abn ekg. Patient presented to [facility] where he was found to have STEMI, recommended to get cath however patient did not want to get it there so it left and came here.  Patient initially had cough, congestion, fever, was placed on antibiotics on Sunday.  Started feeling nauseous and had a presyncopal event after which he presented to ED last night.  Had chest pain as well.  Chest pain is midsternal.  Not currently having chest pain.  Received 4 aspirin 30 min pta. (01 Nov 2023 15:11)       Past Medical History  HTN (hypertension)    CAD (coronary artery disease)  2009; stent    Intracranial hemorrhage  2008    Respiratory arrest  december 1st    Myocardial infarction, unspecified MI type, unspecified artery        Past Surgical History  History of coronary artery stent placement        MEDICATIONS  (STANDING):  chlorhexidine 0.12% Liquid 15 milliLiter(s) Oral Mucosa every 12 hours  furosemide   Injectable 40 milliGRAM(s) IV Push once  labetalol Injectable 20 milliGRAM(s) IV Push once  levoFLOXacin  Tablet 500 milliGRAM(s) Oral every 24 hours  midazolam Injectable 2 milliGRAM(s) IV Push once  propofol Infusion 40 MICROgram(s)/kG/Min (21.5 mL/Hr) IV Continuous <Continuous>    MEDICATIONS  (PRN):      Vital Signs Last 24 Hrs  T(C): 36.6 (11-01-23 @ 12:26), Max: 36.8 (11-01-23 @ 10:00)  T(F): 97.9 (11-01-23 @ 12:26), Max: 98.2 (11-01-23 @ 10:00)  HR: 72 (11-01-23 @ 18:30) (72 - 88)  BP: 127/82 (11-01-23 @ 12:26) (115/95 - 129/98)  RR: 25 (11-01-23 @ 18:30) (7 - 41)  SpO2: 99% (11-01-23 @ 18:30) (95% - 100%)           Daily Height in cm: 175.26 (01 Nov 2023 12:26)    Daily   Admit Wt: Drug Dosing Weight  Height (cm): 175.3 (01 Nov 2023 12:26)  Weight (kg): 89.4 (01 Nov 2023 12:26)  BMI (kg/m2): 29.1 (01 Nov 2023 12:26)  BSA (m2): 2.05 (01 Nov 2023 12:26)    Allergies: penicillins (Unknown)      SOCIAL HISTORY:  Smoker: [ ] Yes  [X] No                 Pt denies  ETOH use: [ ] Yes  [X] No             Pt denies  Ilicit Drug use:  [ ] Yes  [X] No     Pt denies    FAMILY HISTORY:  Family history of meningitis (Sibling)  Brother, death at age 49 from complications of infection (June 2015)    Family history of hypertension in mother  Mother        Review of Systems: unable to assess as patient was intubated and sedated      PHYSICAL EXAM  General: WN, WD.                                              Neuro: Intubated and sedated.                    Neck: Normal exam of jugular veins, trachea & thyroid.   Chest: Normal lung exam with good air movement absence of wheezes, rales, or rhonchi.                                                                         CV:  Auscultation: normal S1S2, RRR   Carotids: No Bruits[X]  Abdominal Aorta: normal [X] nonpalpable[X]                                                                         GI: Normal exam of abdomen with no noted masses or tenderness. +BSx4Q                                                                                            Extremities: Normal no evidence of cyanosis or deformity, Edema: none  Lower Extremity Pulses: Right[+2DP] Left[+2DP] Varicosities[none]  SKIN : Normal exam to inspection & palpation.   IABP: via LFA                                                          LABS:                        14.7   8.24  )-----------( 173      ( 01 Nov 2023 06:14 )             45.0     11-01    137  |  100  |  27<H>  ----------------------------<  233<H>  4.1   |  22  |  1.25    Ca    9.2      01 Nov 2023 06:14  Mg     2.1     11-01    TPro  6.7  /  Alb  3.8  /  TBili  0.3  /  DBili  x   /  AST  94<H>  /  ALT  50<H>  /  AlkPhos  49  11-01    PT/INR - ( 01 Nov 2023 06:14 )   PT: 13.0 sec;   INR: 1.25 ratio         PTT - ( 01 Nov 2023 06:14 )  PTT:26.5 sec      Cardiac Cath:Right Heart Cath   Measurements of pressures and cardiac output by measured ary were  obtained.    Intra-aortic Balloon Pump   An intra-aortic balloon pump was inserted.      Diagnostic Findings:     Coronary Angiography   The coronary circulation is right dominant.      LM   Left main artery: There is a 20 % stenosis.      LAD   Proximal left anterior descending: There is a 70 % stenosis in the  ostium portion of the segment. Proximal left anterior  descending: There is a 100 % in-stent restenosis.      CX   Proximal circumflex: There is a 55 % stenosis. Mid circumflex: There  is a 50 % stenosis. First obtuse marginal: There is a 50  % stenosis.      RCA   Mid right coronary artery: There is a 100 % stenosis.      TTE / CHANDLER:CONCLUSIONS:      1. Left ventricular cavity is moderately dilated. Left ventricular wall thickness is normal. Left ventricular systolic function is severely decreased with an ejection fraction of 32 % by Chinchilla's method of disks. Regional wall motion abnormalities present.   2. Multiple segmental abnormalities exist. See findings.   3. There is moderate (grade 2) left ventricular diastolic dysfunction, with indeterminant filling pressure.   4. Normal right ventricular cavity size, wall thickness, and systolic function.   5. No significant valvular disease.   6. No pericardial effusion seen.   7. Compared to the transthoracic echocardiogram performed on 1/25/2017 the areas of akinesis are unchanged but there has been a decline in LV systolic function with new areas of hypokinesis.       History of Present Illness:  HPI:  58M with HTN, CAD (s/p PCI 2008), ICH, CVA 2008, HFrEF (EF severely reduced 2018), asthma (required intubation in 2018), DM presenting with chest pressure. He notes that he was feeling SOB for the last week, was told he had bilateral pneumonia and started on antibiotics. Has been coughing and having intermittent chest pressure. On the night of presentation, he states that he was diaphoretic, nauseous, and leg tingling. Patient is a poor historian, but per family he passed out briefly for a few seconds and had a heart rate of 180s. EMS was called, who defibrillated patient at his home, no strips available. Patient taken to [facility], loaded with ASA. Patient left AMA because he wanted to be seen at [facility].    On arrival, EKG with ST depressions in lateral leads, LVH, ST elevations in anterior leads not meeting STEMI criteria. Not complaining of chest pain. Troponin 440 -> 414. Pro-BNP 4168. Creatinine 1.25, AST/ALT 94/50.     [facility] 2017:  LM:   --  LM: Normal.  LAD:   --  Proximal LAD: There was a 50 % stenosis.  CX:   --  Circumflex: Normal.  RCA:   --  Mid RCA: There was a 40 % stenosis.  --  Distal RCA: There was a 50 % stenosis.        INTERVAL HPI/OVERNIGHT EVENTS: Prior to undergoing cath, pt was dyspneic and diaphoretic so was intubated. LHC showed pLAD 70 % stenosis in the ostium portion of the segment and 100 % in-stent restenosis and in mRCA, 100 % stenosis. Also found to have elevated R atrial pressure (mRA 23mmHg with a V wave of 24mmHg), moderate post-capillary pulmonary hypertension (sPAP 52mmHg, dPAP 33mmHg, mPAP 40mmHg), PCWP = 30mmHg with a V wave of 33mmHg, AO = 85/66/73. IABP was placed during the cath through R common femoral artery.          Past Medical History  HTN (hypertension)    CAD (coronary artery disease)  2009; stent    Intracranial hemorrhage  2008    Respiratory arrest  december 1st    Myocardial infarction, unspecified MI type, unspecified artery        Past Surgical History  History of coronary artery stent placement        MEDICATIONS  (STANDING):  chlorhexidine 0.12% Liquid 15 milliLiter(s) Oral Mucosa every 12 hours  furosemide   Injectable 40 milliGRAM(s) IV Push once  labetalol Injectable 20 milliGRAM(s) IV Push once  levoFLOXacin  Tablet 500 milliGRAM(s) Oral every 24 hours  midazolam Injectable 2 milliGRAM(s) IV Push once  propofol Infusion 40 MICROgram(s)/kG/Min (21.5 mL/Hr) IV Continuous <Continuous>    MEDICATIONS  (PRN):      Vital Signs Last 24 Hrs  T(C): 36.6 (11-01-23 @ 12:26), Max: 36.8 (11-01-23 @ 10:00)  T(F): 97.9 (11-01-23 @ 12:26), Max: 98.2 (11-01-23 @ 10:00)  HR: 72 (11-01-23 @ 18:30) (72 - 88)  BP: 127/82 (11-01-23 @ 12:26) (115/95 - 129/98)  RR: 25 (11-01-23 @ 18:30) (7 - 41)  SpO2: 99% (11-01-23 @ 18:30) (95% - 100%)           Daily Height in cm: 175.26 (01 Nov 2023 12:26)    Daily   Admit Wt: Drug Dosing Weight  Height (cm): 175.3 (01 Nov 2023 12:26)  Weight (kg): 89.4 (01 Nov 2023 12:26)  BMI (kg/m2): 29.1 (01 Nov 2023 12:26)  BSA (m2): 2.05 (01 Nov 2023 12:26)    Allergies: penicillins (Unknown)      SOCIAL HISTORY:  Smoker: [ ] Yes  [X] No                 Pt denies  ETOH use: [ ] Yes  [X] No             Pt denies  Ilicit Drug use:  [ ] Yes  [X] No     Pt denies    FAMILY HISTORY:  Family history of meningitis (Sibling)  Brother, death at age 49 from complications of infection (June 2015)    Family history of hypertension in mother  Mother        Review of Systems: unable to assess as patient was intubated and sedated      PHYSICAL EXAM  General: WN, WD.                                              Neuro: Intubated and sedated.                    Neck: Normal exam of jugular veins, trachea & thyroid.   Chest: Normal lung exam with good air movement absence of wheezes, rales, or rhonchi.                                                                         CV:  Auscultation: normal S1S2, RRR   Carotids: No Bruits[X]  Abdominal Aorta: normal [X] nonpalpable[X]                                                                         GI: Normal exam of abdomen with no noted masses or tenderness. +BSx4Q                                                                                            Extremities: Normal no evidence of cyanosis or deformity, Edema: none  Lower Extremity Pulses: Right[+2DP] Left[+2DP] Varicosities[none]  SKIN : Normal exam to inspection & palpation.   IABP: via LFA                                                          LABS:                        14.7   8.24  )-----------( 173      ( 01 Nov 2023 06:14 )             45.0     11-01    137  |  100  |  27<H>  ----------------------------<  233<H>  4.1   |  22  |  1.25    Ca    9.2      01 Nov 2023 06:14  Mg     2.1     11-01    TPro  6.7  /  Alb  3.8  /  TBili  0.3  /  DBili  x   /  AST  94<H>  /  ALT  50<H>  /  AlkPhos  49  11-01    PT/INR - ( 01 Nov 2023 06:14 )   PT: 13.0 sec;   INR: 1.25 ratio         PTT - ( 01 Nov 2023 06:14 )  PTT:26.5 sec      Cardiac Cath:Right Heart Cath   Measurements of pressures and cardiac output by measured ary were  obtained.    Intra-aortic Balloon Pump   An intra-aortic balloon pump was inserted.      Diagnostic Findings:     Coronary Angiography   The coronary circulation is right dominant.      LM   Left main artery: There is a 20 % stenosis.      LAD   Proximal left anterior descending: There is a 70 % stenosis in the  ostium portion of the segment. Proximal left anterior  descending: There is a 100 % in-stent restenosis.      CX   Proximal circumflex: There is a 55 % stenosis. Mid circumflex: There  is a 50 % stenosis. First obtuse marginal: There is a 50  % stenosis.      RCA   Mid right coronary artery: There is a 100 % stenosis.      TTE / CHANDLER:CONCLUSIONS:      1. Left ventricular cavity is moderately dilated. Left ventricular wall thickness is normal. Left ventricular systolic function is severely decreased with an ejection fraction of 32 % by Chinchilla's method of disks. Regional wall motion abnormalities present.   2. Multiple segmental abnormalities exist. See findings.   3. There is moderate (grade 2) left ventricular diastolic dysfunction, with indeterminant filling pressure.   4. Normal right ventricular cavity size, wall thickness, and systolic function.   5. No significant valvular disease.   6. No pericardial effusion seen.   7. Compared to the transthoracic echocardiogram performed on 1/25/2017 the areas of akinesis are unchanged but there has been a decline in LV systolic function with new areas of hypokinesis.

## 2023-11-01 NOTE — PROGRESS NOTE ADULT - SUBJECTIVE AND OBJECTIVE BOX
Patient is a 58y old  Male who presents with a chief complaint of     INTERVAL HISTORY:  -    SUBJECTIVE  - Patient seen and evaluated at bedside.     MEDICATIONS:  MEDICATIONS  (STANDING):  chlorhexidine 0.12% Liquid 15 milliLiter(s) Oral Mucosa every 12 hours  furosemide   Injectable 40 milliGRAM(s) IV Push once  labetalol Injectable 20 milliGRAM(s) IV Push once  levoFLOXacin  Tablet 500 milliGRAM(s) Oral every 24 hours  midazolam Injectable 2 milliGRAM(s) IV Push once  propofol Infusion 40 MICROgram(s)/kG/Min (21.5 mL/Hr) IV Continuous <Continuous>    MEDICATIONS  (PRN):      OBJECTIVE:  ICU Vital Signs Last 24 Hrs  T(C): 36.6 (01 Nov 2023 12:26), Max: 36.8 (01 Nov 2023 10:00)  T(F): 97.9 (01 Nov 2023 12:26), Max: 98.2 (01 Nov 2023 10:00)  HR: 72 (01 Nov 2023 18:30) (72 - 88)  BP: 127/82 (01 Nov 2023 12:26) (115/95 - 129/98)  BP(mean): 100 (01 Nov 2023 12:26) (100 - 101)  ABP: --  ABP(mean): --  RR: 25 (01 Nov 2023 18:30) (7 - 41)  SpO2: 99% (01 Nov 2023 18:30) (95% - 100%)    O2 Parameters below as of 01 Nov 2023 18:00  Patient On (Oxygen Delivery Method): [id number]    O2 Concentration (%): 100      Mode: AC/ CMV (Assist Control/ Continuous Mandatory Ventilation)  RR (machine): 16  TV (machine): 600  FiO2: 100  PEEP: 5  ITime: 1  MAP: 12  PIP: 30    Adult Advanced Hemodynamics Last 24 Hrs  CVP(mm Hg): 11 (01 Nov 2023 18:30) (11 - 20)  CVP(cm H2O): --  CO: --  CI: --  PA: 22/17 (01 Nov 2023 18:30) (22/17 - 38/25)  PA(mean): 20 (01 Nov 2023 18:30) (20 - 33)  PCWP: --  SVR: --  SVRI: --  PVR: --  PVRI: --  CAPILLARY BLOOD GLUCOSE        CAPILLARY BLOOD GLUCOSE        I&O's Summary    01 Nov 2023 07:01  -  01 Nov 2023 19:48  --------------------------------------------------------  IN: 53.6 mL / OUT: 1700 mL / NET: -1646.4 mL      Daily Height in cm: 175.26 (01 Nov 2023 12:26)    Daily     PHYSICAL EXAM:  General: NAD, well-groomed, well-developed  Eyes: Conjunctiva and sclera clear  ENMT: Moist mucous membranes  Neck: Supple  Chest: Clear to auscultation bilaterally; no rales, rhonchi, or wheezing  Heart: Regular rate and rhythm; normal S1 and S2; no murmurs, rubs, or gallops  Abd: Soft, nontender, nondistended  Nervous System: AAOX3  Ext: no peripheral LE edema bilaterally  Lines/Tubes: IV peripheral    LABS:  ABG - ( 01 Nov 2023 17:04 )  pH, Arterial: 7.40  pH, Blood: x     /  pCO2: 38    /  pO2: 361   / HCO3: 24    / Base Excess: -1    /  SaO2: 100.0                                   14.7   8.24  )-----------( 173      ( 01 Nov 2023 06:14 )             45.0     11-01    137  |  100  |  27<H>  ----------------------------<  233<H>  4.1   |  22  |  1.25    Ca    9.2      01 Nov 2023 06:14  Mg     2.1     11-01    TPro  6.7  /  Alb  3.8  /  TBili  0.3  /  DBili  x   /  AST  94<H>  /  ALT  50<H>  /  AlkPhos  49  11-01    LIVER FUNCTIONS - ( 01 Nov 2023 06:14 )  Alb: 3.8 g/dL / Pro: 6.7 g/dL / ALK PHOS: 49 U/L / ALT: 50 U/L / AST: 94 U/L / GGT: x           PT/INR - ( 01 Nov 2023 06:14 )   PT: 13.0 sec;   INR: 1.25 ratio         PTT - ( 01 Nov 2023 06:14 )  PTT:26.5 sec        Urinalysis Basic - ( 01 Nov 2023 06:14 )    Color: x / Appearance: x / SG: x / pH: x  Gluc: 233 mg/dL / Ketone: x  / Bili: x / Urobili: x   Blood: x / Protein: x / Nitrite: x   Leuk Esterase: x / RBC: x / WBC x   Sq Epi: x / Non Sq Epi: x / Bacteria: x          Plan:  ====================== NEUROLOGY=====================  - continue to monitor mental status as per protocol     ==================== RESPIRATORY======================  - continue to monitor SpO2 with goal >94%    Mechanical Vent: Mode: AC/ CMV (Assist Control/ Continuous Mandatory Ventilation)  RR (machine): 16  TV (machine): 600  FiO2: 100  PEEP: 5  ITime: 1  MAP: 12  PIP: 30      ====================CARDIOVASCULAR==================      ===================== RENAL =========================  - Continue monitoring urine output, lytes, SCr/ BUN  - replete lytes prn with goal K >4 and Mg >2    =============== GASTROINTESTINAL===================      ===================ENDO====================      ===================HEMATOLOGIC/ONC ===================  - Monitor H/H and plts  - DVT PPX:     ==================INFECTIOUS DISEASE================  - monitor and trend WBC and temperature curve     Dispo:    Patient requires continuous monitoring with bedside rhythm monitoring, arterial line, pulse oximetry, ventilator monitoring and intermittent blood gas analysis.  Care plan discussed with ICU care team.  I have spent 35 minutes providing critical care, in addition to initial critical time provided by CICU attending Dr. Cunningham, re-evaluated multiple times during the day.    Laura Mcclendon PA-C Patient is a 58y old  Male who presents with a chief complaint of     INTERVAL HISTORY:  - Admitted to CICU s/p LHC  - intubated, IABP in place    SUBJECTIVE  - Patient seen and evaluated at bedside.     MEDICATIONS:  MEDICATIONS  (STANDING):  chlorhexidine 0.12% Liquid 15 milliLiter(s) Oral Mucosa every 12 hours  furosemide   Injectable 40 milliGRAM(s) IV Push once  labetalol Injectable 20 milliGRAM(s) IV Push once  levoFLOXacin  Tablet 500 milliGRAM(s) Oral every 24 hours  midazolam Injectable 2 milliGRAM(s) IV Push once  propofol Infusion 40 MICROgram(s)/kG/Min (21.5 mL/Hr) IV Continuous <Continuous>    OBJECTIVE:  ICU Vital Signs Last 24 Hrs  T(C): 36.6 (01 Nov 2023 12:26), Max: 36.8 (01 Nov 2023 10:00)  T(F): 97.9 (01 Nov 2023 12:26), Max: 98.2 (01 Nov 2023 10:00)  HR: 72 (01 Nov 2023 18:30) (72 - 88)  BP: 127/82 (01 Nov 2023 12:26) (115/95 - 129/98)  BP(mean): 100 (01 Nov 2023 12:26) (100 - 101)  RR: 25 (01 Nov 2023 18:30) (7 - 41)  SpO2: 99% (01 Nov 2023 18:30) (95% - 100%)    O2 Parameters below as of 01 Nov 2023 18:00  Patient On (Oxygen Delivery Method): [id number]    O2 Concentration (%): 100    Mode: AC/ CMV (Assist Control/ Continuous Mandatory Ventilation)  RR (machine): 16  TV (machine): 600  FiO2: 100  PEEP: 5  ITime: 1  MAP: 12  PIP: 30    Adult Advanced Hemodynamics Last 24 Hrs  CVP(mm Hg): 11 (01 Nov 2023 18:30) (11 - 20)  PA: 22/17 (01 Nov 2023 18:30) (22/17 - 38/25)  PA(mean): 20 (01 Nov 2023 18:30) (20 - 33)    I&O's Summary    01 Nov 2023 07:01  -  01 Nov 2023 19:48  --------------------------------------------------------  IN: 53.6 mL / OUT: 1700 mL / NET: -1646.4 mL      Daily Height in cm: 175.26 (01 Nov 2023 12:26)    Daily     PHYSICAL EXAM:  General: intubated, sedated  Chest: Clear to auscultation bilaterally; no rales, rhonchi, or wheezing  Heart: Regular rate and rhythm; normal S1 and S2; no murmurs, rubs, or gallops  Abd: Soft, nontender, nondistended  Nervous System: sedated on propofol, opens eyes on command  Ext: no peripheral LE edema bilaterally    LABS:  ABG - ( 01 Nov 2023 17:04 )  pH, Arterial: 7.40  pH, Blood: x     /  pCO2: 38    /  pO2: 361   / HCO3: 24    / Base Excess: -1    /  SaO2: 100.0                         14.7   8.24  )-----------( 173      ( 01 Nov 2023 06:14 )             45.0     11-01  137  |  100  |  27<H>  ----------------------------<  233<H>  4.1   |  22  |  1.25    Ca    9.2      01 Nov 2023 06:14  Mg     2.1     11-01    TPro  6.7  /  Alb  3.8  /  TBili  0.3  /  DBili  x   /  AST  94<H>  /  ALT  50<H>  /  AlkPhos  49  11-01    LIVER FUNCTIONS - ( 01 Nov 2023 06:14 )  Alb: 3.8 g/dL / Pro: 6.7 g/dL / ALK PHOS: 49 U/L / ALT: 50 U/L / AST: 94 U/L / GGT: x           PT/INR - ( 01 Nov 2023 06:14 )   PT: 13.0 sec;   INR: 1.25 ratio    PTT - ( 01 Nov 2023 06:14 )  PTT:26.5 sec    Urinalysis Basic - ( 01 Nov 2023 06:14 )    Color: x / Appearance: x / SG: x / pH: x  Gluc: 233 mg/dL / Ketone: x  / Bili: x / Urobili: x   Blood: x / Protein: x / Nitrite: x   Leuk Esterase: x / RBC: x / WBC x   Sq Epi: x / Non Sq Epi: x / Bacteria: x      Assessment: 58M HTN, CAD (s/p PCI 2008), HFrEF (EF severely reduced 2018) not on GDMT, CVA 2018 (requiring tpA), DM presenting with chest pressure and unknown tachycardia that was shocked x1, LHC 11/1 found to have chronic total occlusion of LAD and RCA, with elevated RA and PA pressures, echo 11/1 w/ severely decreased EF 32%, s/p IABP 11/1, likely in cardiogenic shock 2/2 acute on chronic HF s/p unknown shockable rhythm.    Plan:  ====================== NEUROLOGY=====================  Sedated on propofol  - c/w propofol 50, wean as tolerated  - consider switching to precedex in AM to SBT   - continue to monitor mental status as per protocol     ==================== RESPIRATORY======================  Intubated for airway protection prior to LHC  - vent settings: 500/16/5/50%  - wean FiO2 as tolerated   - trend ABGs  - continue to monitor SpO2 with goal >94%    Mechanical Vent: Mode: AC/ CMV (Assist Control/ Continuous Mandatory Ventilation)  RR (machine): 16  TV (machine): 500  FiO2: 100  PEEP: 5  ITime: 1  MAP: 12  PIP: 30    ====================CARDIOVASCULAR==================  Cardiogenic Shock  - 11/1 LHC: pLAD 100 % in-stent restenosis & mRCA, 100 %. PCWP 30. +IABP.  - 11/1 TTE: LV dilated. EF 32 %. Regional WMAs present, mod (grade 2) LV diastolic dysfunction  - c/w ASA/Brilinta for DAPT  - c/w Lipitor 80  - maintain IABP 1:1  - daily CXR for IABP  - Keep right leg straight while sheaths/IABP/[place] are in position   - consider starting inotropic support if worsening shock    ===================== RENAL =========================  Monitor Cr  - Continue monitoring urine output, lytes, SCr/ BUN  - replete lytes prn with goal K >4 and Mg >2    =============== GASTROINTESTINAL===================  Consider starting tube feeds    ===================ENDO====================  F/u A1c, TSH, lipids    ===================HEMATOLOGIC/ONC ===================  H/H & plts stable  - Monitor H/H and plts  - DVT PPX: heparin gtt    ==================INFECTIOUS DISEASE================  ?PNA  - c/w Levaquin  - consider ID c/s  - monitor and trend WBC and temperature curve     Dispo: Maintain in ICU while intubated & requiring mechanical support    Patient requires continuous monitoring with bedside rhythm monitoring, arterial line, pulse oximetry, ventilator monitoring and intermittent blood gas analysis.  Care plan discussed with ICU care team.  I have spent 35 minutes providing critical care, in addition to initial critical time provided by CICU attending Dr. Cunningham, re-evaluated multiple times during the day.    Laura Mcclendon PA-C Patient is a 58y old  Male who presents with a chief complaint of     INTERVAL HISTORY:  - Admitted to CICU s/p LHC  - intubated, IABP in place    SUBJECTIVE  - Patient seen and evaluated at bedside.     MEDICATIONS:  MEDICATIONS  (STANDING):  chlorhexidine 0.12% Liquid 15 milliLiter(s) Oral Mucosa every 12 hours  furosemide   Injectable 40 milliGRAM(s) IV Push once  labetalol Injectable 20 milliGRAM(s) IV Push once  levoFLOXacin  Tablet 500 milliGRAM(s) Oral every 24 hours  midazolam Injectable 2 milliGRAM(s) IV Push once  propofol Infusion 40 MICROgram(s)/kG/Min (21.5 mL/Hr) IV Continuous <Continuous>    OBJECTIVE:  ICU Vital Signs Last 24 Hrs  T(C): 36.6 (01 Nov 2023 12:26), Max: 36.8 (01 Nov 2023 10:00)  T(F): 97.9 (01 Nov 2023 12:26), Max: 98.2 (01 Nov 2023 10:00)  HR: 72 (01 Nov 2023 18:30) (72 - 88)  BP: 127/82 (01 Nov 2023 12:26) (115/95 - 129/98)  BP(mean): 100 (01 Nov 2023 12:26) (100 - 101)  RR: 25 (01 Nov 2023 18:30) (7 - 41)  SpO2: 99% (01 Nov 2023 18:30) (95% - 100%)    O2 Parameters below as of 01 Nov 2023 18:00  Patient On (Oxygen Delivery Method): [id number]    O2 Concentration (%): 100    Mode: AC/ CMV (Assist Control/ Continuous Mandatory Ventilation)  RR (machine): 16  TV (machine): 600  FiO2: 100  PEEP: 5  ITime: 1  MAP: 12  PIP: 30    Adult Advanced Hemodynamics Last 24 Hrs  CVP(mm Hg): 11 (01 Nov 2023 18:30) (11 - 20)  PA: 22/17 (01 Nov 2023 18:30) (22/17 - 38/25)  PA(mean): 20 (01 Nov 2023 18:30) (20 - 33)    I&O's Summary    01 Nov 2023 07:01  -  01 Nov 2023 19:48  --------------------------------------------------------  IN: 53.6 mL / OUT: 1700 mL / NET: -1646.4 mL      Daily Height in cm: 175.26 (01 Nov 2023 12:26)    Daily     PHYSICAL EXAM:  General: intubated, sedated  Chest: Clear to auscultation bilaterally; no rales, rhonchi, or wheezing  Heart: Regular rate and rhythm; normal S1 and S2; no murmurs, rubs, or gallops  Abd: Soft, nontender, nondistended  Nervous System: sedated on propofol, opens eyes on command  Ext: no peripheral LE edema bilaterally    LABS:  ABG - ( 01 Nov 2023 17:04 )  pH, Arterial: 7.40  pH, Blood: x     /  pCO2: 38    /  pO2: 361   / HCO3: 24    / Base Excess: -1    /  SaO2: 100.0                         14.7   8.24  )-----------( 173      ( 01 Nov 2023 06:14 )             45.0     11-01  137  |  100  |  27<H>  ----------------------------<  233<H>  4.1   |  22  |  1.25    Ca    9.2      01 Nov 2023 06:14  Mg     2.1     11-01    TPro  6.7  /  Alb  3.8  /  TBili  0.3  /  DBili  x   /  AST  94<H>  /  ALT  50<H>  /  AlkPhos  49  11-01    LIVER FUNCTIONS - ( 01 Nov 2023 06:14 )  Alb: 3.8 g/dL / Pro: 6.7 g/dL / ALK PHOS: 49 U/L / ALT: 50 U/L / AST: 94 U/L / GGT: x           PT/INR - ( 01 Nov 2023 06:14 )   PT: 13.0 sec;   INR: 1.25 ratio    PTT - ( 01 Nov 2023 06:14 )  PTT:26.5 sec    Urinalysis Basic - ( 01 Nov 2023 06:14 )    Color: x / Appearance: x / SG: x / pH: x  Gluc: 233 mg/dL / Ketone: x  / Bili: x / Urobili: x   Blood: x / Protein: x / Nitrite: x   Leuk Esterase: x / RBC: x / WBC x   Sq Epi: x / Non Sq Epi: x / Bacteria: x      Assessment: 58M HTN, CAD (s/p PCI 2008), HFrEF (EF severely reduced 2018) not on GDMT, CVA 2018 (requiring tpA), DM presenting with chest pressure and unknown tachycardia that was shocked x1, LHC 11/1 found to have chronic total occlusion of LAD and RCA, with elevated RA and PA pressures, echo 11/1 w/ severely decreased EF 32%, s/p IABP 11/1, likely in cardiogenic shock 2/2 acute on chronic HF s/p unknown shockable rhythm.    Plan:  ====================== NEUROLOGY=====================  Sedated on propofol  - c/w propofol 50, wean as tolerated  - consider switching to precedex in AM to SBT   - continue to monitor mental status as per protocol     ==================== RESPIRATORY======================  Intubated for airway protection prior to LHC  - vent settings: 500/16/5/50%  - wean FiO2 as tolerated   - trend ABGs  - continue to monitor SpO2 with goal >94%    Mechanical Vent: Mode: AC/ CMV (Assist Control/ Continuous Mandatory Ventilation)  RR (machine): 16  TV (machine): 500  FiO2: 100  PEEP: 5  ITime: 1  MAP: 12  PIP: 30    ====================CARDIOVASCULAR==================  Cardiogenic Shock  - 11/1 LHC: pLAD 100 % in-stent restenosis & mRCA, 100 %. PCWP 30. +IABP.  - 11/1 TTE: LV dilated. EF 32 %. Regional WMAs present, mod (grade 2) LV diastolic dysfunction  - c/w ASA/Brilinta for DAPT  - c/w Lipitor 80  - maintain IABP 1:1  - daily CXR for IABP  - Keep right leg straight while sheaths/IABP/[place] are in position   - consider starting inotropic support if worsening shock    ===================== RENAL =========================  Monitor Cr  - Continue monitoring urine output, lytes, SCr/ BUN  - replete lytes prn with goal K >4 and Mg >2    =============== GASTROINTESTINAL===================  Consider starting tube feeds    ===================ENDO====================  F/u A1c, TSH, lipids    ===================HEMATOLOGIC/ONC ===================  H/H & plts stable  - Monitor H/H and plts  - DVT PPX: heparin gtt    ==================INFECTIOUS DISEASE================  COVID +  - ?COVID PNA, c/w Levaquin  - ID c/s in AM  - monitor and trend WBC and temperature curve     Dispo: Maintain in ICU while intubated & requiring mechanical support    Patient requires continuous monitoring with bedside rhythm monitoring, arterial line, pulse oximetry, ventilator monitoring and intermittent blood gas analysis.  Care plan discussed with ICU care team.  I have spent 35 minutes providing critical care, in addition to initial critical time provided by CICU attending Dr. Cunningham, re-evaluated multiple times during the day.    Laura Mcclendon PA-C

## 2023-11-01 NOTE — CONSULT NOTE ADULT - ASSESSMENT
Ron Santos  [address] [facility] [address]    Dr. Dickson Ashley     [facility]   [address] [facility], [address]  **Outpatient surgery**      Please follow these instructions. Disregard instructions that are provided on the medication package.    Date of Procedure: Friday September 14, 2018  Check in at: 11:00 am   Procedure at: 12:00 pm    Your laxative prescription (NuLytely) may be picked up from:  [facility]    Colonoscopy Preparation Instructions  ?  **Please make arrangements for a responsible adult to drive you home. (A responsible adult is someone age 18 or older who can receive and understand instructions, stay with you, and call for assistance as instructed).**    Regarding Your Medications Prior to Your Procedure:  • **Do not take any iron or iron-containing multivitamins beginning five days prior to your procedure.    • Blood-thinners typically need to be stopped a few days prior to the procedure. Check with your prescribing physician if you take blood thinners such as Coumadin (warfarin), Pradaxa, Plavix, Eliquis, Xarelto or Brilinta.  • **Do not take aspirin or anti-inflammatory medications (Advil, Motrin, ibuprofen, Aleve, naproxen), in the morning on the day of your procedure.  • If you are diabetic:  o Take half of your usual dose of insulin the night before. DO NOT take insulin the morning of the procedure.  o Hold oral diabetic medications the night before and the morning of the procedure. **metformin**    The Day Before the Procedure:  • Start a clear liquid diet at breakfast. Continue a clear liquid diet for the entire day in unlimited amounts. Clear liquids are listed below.   • In the morning, add tap water to the laxative container, shake well and place in the refrigerator.   • Lemonade flavor Crystal Lite mix, obtained at the grocery store, can also be used to improve the flavor. Once water is added, the solution is  59 yo M with HTN, CAD (s/p PCI 2008), HFrEF (EF severely reduced 2018) not on GDMT, CVA 2018 (requiring tpA), DM presenting with chest pressure and unknown tachycardia that was shocked x1, [facility] 11/1 found to have chronic total occlusion of LAD and RCA, with elevated RA and PA pressures, echo 11/1 w/ severely decreased EF 32%, s/p IABP 11/1, likely in cardiogenic shock 2/2 acute on chronic HF s/p unknown shockable rhythm.   only good for 48 hours.   • At approximately 5 PM, (or when you are home for the evening), begin drinking the Nulytely solution. Drink 8 oz. Every 20 minutes until you have consumed half of the gallon of the solution.   • Walking will help the laxative move through the colon.     The Day of the Procedure:  • At 7 AM (or earlier) begin drinking the remaining Nulytely solution. Drink 8 oz. Every 10 minutes until you have consumed the remainder of the gallon of the solution.   • You may take your morning medications with a sip of water, unless otherwise directed as on page 1.  o ALSO- do NOT take your ENALAPRIL the am of procedure    • Do not have anything by mouth after 10:00  AM.     Clear Liquid Diet:  Do not consume anything red or purple.    Beverages: soft drinks/soda, Gatorade or Tai-Aid, clear fruit juices without pulp, water, tea, coffee (no milk or non dairy creamer).   Broths: chicken, beef or vegetable.   Desserts: hard candies, Jell-O, Popsicles. (No fruit bars or sherbet)    If stools are not clear yellow the morning of the procedure, please call the GI lab at [phone] ext [phone].                                   About Your Colonoscopy    What is colonoscopy?    Colonoscopy is a procedure that allows your doctor to clearly see the lining of your colon (large bowel).  A ?exible tube, about the thickness of your ?nger, is put into the rectum and moved slowly through the entire colon.  A special camera in the tube allows the doctor to see any problem areas in the colon.    Why is a colonoscopy needed?    A colonoscopy can be done:     1. As a screening test for colon cancer.  The American Cancer Society recommends colon screening for every adult starting at age 50, sometimes earlier, if you have a family history of colon cancer or polyps.  Ask your doctor when you should be screened.  2. To ?nd out what is causing symptoms such as rectal bleeding or changes in bowel habits (X-rays alone may not ?nd the  problem).  3. As a regular follow-up exam for patients with previous polyps, colon cancer, or a family history of colon cancer.     How do I prepare for the colonoscopy?    For the best possible exam, the colon must be completely empty.  Your doctor will give you detailed instructions for a cleansing routine and diet to follow.  Or, you will be told what time to arrive at the hospital to begin the cleansing routine before your colonoscopy.    Can I take my medicines?    Most medicines can be taken as usual, but some can interfere with the preparation or the exam.  Please talk with your doctor at least a week before the exam.  Ask about taking your medicines, especially if you take aspirin products, anticoagulants (blood thinners), arthritis or blood pressure medicines, insulin or iron products.    What happens during the colonoscopy?    Your doctor will give you medicine through a vein in your arm to help you relax and stay comfortable during the exam.  You will lie on your side or on your back while the ?exible tube is moved slowly through the large bowel.  As the tube is slowly withdrawn, the doctor looks at the lining of the large bowel.  You may feel some cramping or gas but the medicine should keep you comfortable.  The exam usually takes about 20 to 30 minutes.    What is a polypectomy?    Sometimes polyps are found during a colonoscopy.     Polyps are abnormal growths of tissue found on the colon lining.  If your doctor thinks a polyp should be removed, a small wire loop or snare will be passed through the tube and the polyp will be cut out.  You will not feel this.  Most polyps are benign (not cancer).  But some may contain an area of cancer or may develop into cancer.     Removal of colon polyps is an important way to prevent colon cancer.  People who have had a history of polyps may need to have follow-up colonoscopies to check if new polyps have formed.  These follow-up exams usually occur in one to ?ve  years of your ?rst exam (your doctor will tell you when you need to schedule another exam).  Some people who have large polyps or cancerous polyps may need to have surgery.  Your doctor will educate and prepare you for this step, if needed.    What happens after the colonoscopy?    · Your doctor will explain the results to you.   · You may have some cramping or bloating because of the air put into the colon during the exam.  This should go away quickly with passage of gas.   · Generally, you should be able to eat and drink as usual after the exam.  · If you were given medicines to help you relax during the exam, someone must take you home.  For your own safety, please have a friend or family member drive you.  If you do not have a ride home, your procedure may need to be rescheduled.    Going home    · You should not drive or operate any machinery for 24 hours.  Even if you feel alert, your judgment and re?exes may be slower from the sedative medicine.  · Do not make legally binding decisions for the next 24 hours.  · Do not drink alcohol for the next 24 hours.      Are there complications of colonoscopy?    Complications after a colonoscopy are rare, but can occur.     What are the risks of colonoscopy?    While this is a relatively safe and routine procedure, there are risks associated with it.  The average risk of potential complications is reported to be less than 1%.  Please note this percent is NOT zero.  Complications can and do occur.    These can include, but are not limited to:    · Perforation:  A tear or hole in the colon.  Average risk is generally less than 0.1%.  Emergency surgery may be required to repair this.  · Bleeding after the removal of a polyp can occur, generally less than 0.5% risk.  This will often stop on its own, but may require blood transfusion, repeat colonoscopy, or surgery.  · Risk of infection or injury to internal organs is extremely low.  · Complications from the sedation, which  can include (and are not limited to): breathing or blood pressure problems, pneumonia infection, heart problems or heart attack, stroke, etc.  · While there are risks as mentioned above, having a colonoscopy to identify and remove polyps can prevent up to 90% of colorectal cancers.    When should I call the doctor?    Although complications after colonoscopy are not common, it is important to know the early signs of any possible complication.  Call the doctor who performed your colonoscopy if you notice:    · Severe abdominal pain  · Fever and chills  · Bloody bowel movement; bleeding can occur several days after polyp removal  · Distended and hard abdomen  · If you have any questions or concerns    Need more information?    Please talk with your doctor or the of?ce staff if you have questions about the colonoscopy.  For any questions regarding cost, billing and insurance coverage, please call [phone].  If you have questions that have not been answered, please discuss them with the nurse or doctor before the exam begins.                                                                                                      Dr. Dickson Ashley   7/19/2018        Ron Santos  [address] [facility] [address]                    Dear Mr. Santos    Please review the enclosed information.    Thank you.

## 2023-11-01 NOTE — ED PROVIDER NOTE - PROGRESS NOTE DETAILS
Attending David:  trop of 440, repeat ekg, w/out stemi but does have some changes, name concern is biphasic t wave in v2, pt received 4 baby asa at [facility], unclear if he received other AC Jr Ross- cardiology consulted. Nishant (Fellow): Received signout, EKG changes with troponin elevation, received ASA, currently chest pain free, Dr. Josh Yoon (cards) evaluated, plans for treating for NSTEMI, awaiting formal recs Nishant (Fellow): Received signout, EKG changes with troponin elevation, received ASA, currently chest pain free, Dr. Josh Yoon (cards) evaluated, plans for treating for NSTEMI with heparin/brilinta load, awaiting formal recs about disposition Trop dropping, patient remains asymptomatic, cards to cath after echo

## 2023-11-02 DIAGNOSIS — R09.2 RESPIRATORY ARREST: ICD-10-CM

## 2023-11-02 DIAGNOSIS — I25.10 ATHEROSCLEROTIC HEART DISEASE OF NATIVE CORONARY ARTERY WITHOUT ANGINA PECTORIS: ICD-10-CM

## 2023-11-02 LAB
A1C WITH ESTIMATED AVERAGE GLUCOSE RESULT: 8.3 % — HIGH (ref 4–5.6)
A1C WITH ESTIMATED AVERAGE GLUCOSE RESULT: 8.3 % — HIGH (ref 4–5.6)
ALBUMIN SERPL ELPH-MCNC: 3.3 G/DL — SIGNIFICANT CHANGE UP (ref 3.3–5)
ALP SERPL-CCNC: 42 U/L — SIGNIFICANT CHANGE UP (ref 40–120)
ALP SERPL-CCNC: 42 U/L — SIGNIFICANT CHANGE UP (ref 40–120)
ALP SERPL-CCNC: 45 U/L — SIGNIFICANT CHANGE UP (ref 40–120)
ALP SERPL-CCNC: 45 U/L — SIGNIFICANT CHANGE UP (ref 40–120)
ALT FLD-CCNC: 65 U/L — HIGH (ref 10–45)
ALT FLD-CCNC: 65 U/L — HIGH (ref 10–45)
ALT FLD-CCNC: 66 U/L — HIGH (ref 10–45)
ALT FLD-CCNC: 66 U/L — HIGH (ref 10–45)
ANION GAP SERPL CALC-SCNC: 14 MMOL/L — SIGNIFICANT CHANGE UP (ref 5–17)
APPEARANCE UR: ABNORMAL
APPEARANCE UR: ABNORMAL
APTT BLD: 25.9 SEC — SIGNIFICANT CHANGE UP (ref 24.5–35.6)
APTT BLD: 25.9 SEC — SIGNIFICANT CHANGE UP (ref 24.5–35.6)
APTT BLD: 57.6 SEC — HIGH (ref 24.5–35.6)
APTT BLD: 57.6 SEC — HIGH (ref 24.5–35.6)
AST SERPL-CCNC: 109 U/L — HIGH (ref 10–40)
AST SERPL-CCNC: 109 U/L — HIGH (ref 10–40)
AST SERPL-CCNC: 139 U/L — HIGH (ref 10–40)
AST SERPL-CCNC: 139 U/L — HIGH (ref 10–40)
BACTERIA # UR AUTO: ABNORMAL /HPF
BACTERIA # UR AUTO: ABNORMAL /HPF
BASE EXCESS BLDMV CALC-SCNC: 1.2 MMOL/L — SIGNIFICANT CHANGE UP (ref -3–3)
BASE EXCESS BLDMV CALC-SCNC: 1.2 MMOL/L — SIGNIFICANT CHANGE UP (ref -3–3)
BASOPHILS # BLD AUTO: 0.02 K/UL — SIGNIFICANT CHANGE UP (ref 0–0.2)
BASOPHILS # BLD AUTO: 0.02 K/UL — SIGNIFICANT CHANGE UP (ref 0–0.2)
BASOPHILS NFR BLD AUTO: 0.3 % — SIGNIFICANT CHANGE UP (ref 0–2)
BASOPHILS NFR BLD AUTO: 0.3 % — SIGNIFICANT CHANGE UP (ref 0–2)
BILIRUB SERPL-MCNC: 0.4 MG/DL — SIGNIFICANT CHANGE UP (ref 0.2–1.2)
BILIRUB UR-MCNC: NEGATIVE — SIGNIFICANT CHANGE UP
BILIRUB UR-MCNC: NEGATIVE — SIGNIFICANT CHANGE UP
BUN SERPL-MCNC: 19 MG/DL — SIGNIFICANT CHANGE UP (ref 7–23)
BUN SERPL-MCNC: 19 MG/DL — SIGNIFICANT CHANGE UP (ref 7–23)
BUN SERPL-MCNC: 21 MG/DL — SIGNIFICANT CHANGE UP (ref 7–23)
BUN SERPL-MCNC: 21 MG/DL — SIGNIFICANT CHANGE UP (ref 7–23)
CALCIUM SERPL-MCNC: 8 MG/DL — LOW (ref 8.4–10.5)
CALCIUM SERPL-MCNC: 8 MG/DL — LOW (ref 8.4–10.5)
CALCIUM SERPL-MCNC: 8.1 MG/DL — LOW (ref 8.4–10.5)
CALCIUM SERPL-MCNC: 8.1 MG/DL — LOW (ref 8.4–10.5)
CAST: 1 /LPF — SIGNIFICANT CHANGE UP (ref 0–4)
CAST: 1 /LPF — SIGNIFICANT CHANGE UP (ref 0–4)
CHLORIDE SERPL-SCNC: 107 MMOL/L — SIGNIFICANT CHANGE UP (ref 96–108)
CO2 BLDMV-SCNC: 28 MMOL/L — SIGNIFICANT CHANGE UP (ref 21–29)
CO2 BLDMV-SCNC: 28 MMOL/L — SIGNIFICANT CHANGE UP (ref 21–29)
CO2 SERPL-SCNC: 18 MMOL/L — LOW (ref 22–31)
CO2 SERPL-SCNC: 18 MMOL/L — LOW (ref 22–31)
CO2 SERPL-SCNC: 19 MMOL/L — LOW (ref 22–31)
CO2 SERPL-SCNC: 19 MMOL/L — LOW (ref 22–31)
COLOR SPEC: YELLOW — SIGNIFICANT CHANGE UP
COLOR SPEC: YELLOW — SIGNIFICANT CHANGE UP
CREAT SERPL-MCNC: 1.08 MG/DL — SIGNIFICANT CHANGE UP (ref 0.5–1.3)
CREAT SERPL-MCNC: 1.08 MG/DL — SIGNIFICANT CHANGE UP (ref 0.5–1.3)
CREAT SERPL-MCNC: 1.09 MG/DL — SIGNIFICANT CHANGE UP (ref 0.5–1.3)
CREAT SERPL-MCNC: 1.09 MG/DL — SIGNIFICANT CHANGE UP (ref 0.5–1.3)
DIFF PNL FLD: ABNORMAL
DIFF PNL FLD: ABNORMAL
EGFR: 78 ML/MIN/1.73M2 — SIGNIFICANT CHANGE UP
EGFR: 78 ML/MIN/1.73M2 — SIGNIFICANT CHANGE UP
EGFR: 79 ML/MIN/1.73M2 — SIGNIFICANT CHANGE UP
EGFR: 79 ML/MIN/1.73M2 — SIGNIFICANT CHANGE UP
EOSINOPHIL # BLD AUTO: 0.12 K/UL — SIGNIFICANT CHANGE UP (ref 0–0.5)
EOSINOPHIL # BLD AUTO: 0.12 K/UL — SIGNIFICANT CHANGE UP (ref 0–0.5)
EOSINOPHIL NFR BLD AUTO: 1.6 % — SIGNIFICANT CHANGE UP (ref 0–6)
EOSINOPHIL NFR BLD AUTO: 1.6 % — SIGNIFICANT CHANGE UP (ref 0–6)
ESTIMATED AVERAGE GLUCOSE: 192 MG/DL — HIGH (ref 68–114)
ESTIMATED AVERAGE GLUCOSE: 192 MG/DL — HIGH (ref 68–114)
GAS PNL BLDA: SIGNIFICANT CHANGE UP
GAS PNL BLDA: SIGNIFICANT CHANGE UP
GAS PNL BLDMV: SIGNIFICANT CHANGE UP
GAS PNL BLDMV: SIGNIFICANT CHANGE UP
GLUCOSE BLDC GLUCOMTR-MCNC: 104 MG/DL — HIGH (ref 70–99)
GLUCOSE BLDC GLUCOMTR-MCNC: 104 MG/DL — HIGH (ref 70–99)
GLUCOSE BLDC GLUCOMTR-MCNC: 114 MG/DL — HIGH (ref 70–99)
GLUCOSE BLDC GLUCOMTR-MCNC: 114 MG/DL — HIGH (ref 70–99)
GLUCOSE BLDC GLUCOMTR-MCNC: 139 MG/DL — HIGH (ref 70–99)
GLUCOSE BLDC GLUCOMTR-MCNC: 139 MG/DL — HIGH (ref 70–99)
GLUCOSE BLDC GLUCOMTR-MCNC: 157 MG/DL — HIGH (ref 70–99)
GLUCOSE BLDC GLUCOMTR-MCNC: 157 MG/DL — HIGH (ref 70–99)
GLUCOSE BLDC GLUCOMTR-MCNC: 175 MG/DL — HIGH (ref 70–99)
GLUCOSE BLDC GLUCOMTR-MCNC: 175 MG/DL — HIGH (ref 70–99)
GLUCOSE SERPL-MCNC: 110 MG/DL — HIGH (ref 70–99)
GLUCOSE SERPL-MCNC: 110 MG/DL — HIGH (ref 70–99)
GLUCOSE SERPL-MCNC: 140 MG/DL — HIGH (ref 70–99)
GLUCOSE SERPL-MCNC: 140 MG/DL — HIGH (ref 70–99)
GLUCOSE UR QL: >=1000 MG/DL
GLUCOSE UR QL: >=1000 MG/DL
HCO3 BLDMV-SCNC: 27 MMOL/L — SIGNIFICANT CHANGE UP (ref 20–28)
HCO3 BLDMV-SCNC: 27 MMOL/L — SIGNIFICANT CHANGE UP (ref 20–28)
HCT VFR BLD CALC: 41.3 % — SIGNIFICANT CHANGE UP (ref 39–50)
HCT VFR BLD CALC: 41.3 % — SIGNIFICANT CHANGE UP (ref 39–50)
HGB BLD-MCNC: 13.8 G/DL — SIGNIFICANT CHANGE UP (ref 13–17)
HGB BLD-MCNC: 13.8 G/DL — SIGNIFICANT CHANGE UP (ref 13–17)
HOROWITZ INDEX BLDMV+IHG-RTO: 40 — SIGNIFICANT CHANGE UP
HOROWITZ INDEX BLDMV+IHG-RTO: 40 — SIGNIFICANT CHANGE UP
IMM GRANULOCYTES NFR BLD AUTO: 0.5 % — SIGNIFICANT CHANGE UP (ref 0–0.9)
IMM GRANULOCYTES NFR BLD AUTO: 0.5 % — SIGNIFICANT CHANGE UP (ref 0–0.9)
KETONES UR-MCNC: 15 MG/DL
KETONES UR-MCNC: 15 MG/DL
LEUKOCYTE ESTERASE UR-ACNC: ABNORMAL
LEUKOCYTE ESTERASE UR-ACNC: ABNORMAL
LYMPHOCYTES # BLD AUTO: 1.55 K/UL — SIGNIFICANT CHANGE UP (ref 1–3.3)
LYMPHOCYTES # BLD AUTO: 1.55 K/UL — SIGNIFICANT CHANGE UP (ref 1–3.3)
LYMPHOCYTES # BLD AUTO: 20.5 % — SIGNIFICANT CHANGE UP (ref 13–44)
LYMPHOCYTES # BLD AUTO: 20.5 % — SIGNIFICANT CHANGE UP (ref 13–44)
MAGNESIUM SERPL-MCNC: 2 MG/DL — SIGNIFICANT CHANGE UP (ref 1.6–2.6)
MAGNESIUM SERPL-MCNC: 2 MG/DL — SIGNIFICANT CHANGE UP (ref 1.6–2.6)
MAGNESIUM SERPL-MCNC: 2.1 MG/DL — SIGNIFICANT CHANGE UP (ref 1.6–2.6)
MAGNESIUM SERPL-MCNC: 2.1 MG/DL — SIGNIFICANT CHANGE UP (ref 1.6–2.6)
MCHC RBC-ENTMCNC: 28.5 PG — SIGNIFICANT CHANGE UP (ref 27–34)
MCHC RBC-ENTMCNC: 28.5 PG — SIGNIFICANT CHANGE UP (ref 27–34)
MCHC RBC-ENTMCNC: 33.4 GM/DL — SIGNIFICANT CHANGE UP (ref 32–36)
MCHC RBC-ENTMCNC: 33.4 GM/DL — SIGNIFICANT CHANGE UP (ref 32–36)
MCV RBC AUTO: 85.2 FL — SIGNIFICANT CHANGE UP (ref 80–100)
MCV RBC AUTO: 85.2 FL — SIGNIFICANT CHANGE UP (ref 80–100)
MONOCYTES # BLD AUTO: 0.54 K/UL — SIGNIFICANT CHANGE UP (ref 0–0.9)
MONOCYTES # BLD AUTO: 0.54 K/UL — SIGNIFICANT CHANGE UP (ref 0–0.9)
MONOCYTES NFR BLD AUTO: 7.1 % — SIGNIFICANT CHANGE UP (ref 2–14)
MONOCYTES NFR BLD AUTO: 7.1 % — SIGNIFICANT CHANGE UP (ref 2–14)
NEUTROPHILS # BLD AUTO: 5.29 K/UL — SIGNIFICANT CHANGE UP (ref 1.8–7.4)
NEUTROPHILS # BLD AUTO: 5.29 K/UL — SIGNIFICANT CHANGE UP (ref 1.8–7.4)
NEUTROPHILS NFR BLD AUTO: 70 % — SIGNIFICANT CHANGE UP (ref 43–77)
NEUTROPHILS NFR BLD AUTO: 70 % — SIGNIFICANT CHANGE UP (ref 43–77)
NITRITE UR-MCNC: NEGATIVE — SIGNIFICANT CHANGE UP
NITRITE UR-MCNC: NEGATIVE — SIGNIFICANT CHANGE UP
NRBC # BLD: 0 /100 WBCS — SIGNIFICANT CHANGE UP (ref 0–0)
NRBC # BLD: 0 /100 WBCS — SIGNIFICANT CHANGE UP (ref 0–0)
O2 CT VFR BLD CALC: 44 MMHG — SIGNIFICANT CHANGE UP (ref 30–65)
O2 CT VFR BLD CALC: 44 MMHG — SIGNIFICANT CHANGE UP (ref 30–65)
PCO2 BLDMV: 44 MMHG — SIGNIFICANT CHANGE UP (ref 30–65)
PCO2 BLDMV: 44 MMHG — SIGNIFICANT CHANGE UP (ref 30–65)
PH BLDMV: 7.39 — SIGNIFICANT CHANGE UP (ref 7.32–7.45)
PH BLDMV: 7.39 — SIGNIFICANT CHANGE UP (ref 7.32–7.45)
PH UR: 6 — SIGNIFICANT CHANGE UP (ref 5–8)
PH UR: 6 — SIGNIFICANT CHANGE UP (ref 5–8)
PHOSPHATE SERPL-MCNC: 2.8 MG/DL — SIGNIFICANT CHANGE UP (ref 2.5–4.5)
PHOSPHATE SERPL-MCNC: 2.8 MG/DL — SIGNIFICANT CHANGE UP (ref 2.5–4.5)
PHOSPHATE SERPL-MCNC: 3.4 MG/DL — SIGNIFICANT CHANGE UP (ref 2.5–4.5)
PHOSPHATE SERPL-MCNC: 3.4 MG/DL — SIGNIFICANT CHANGE UP (ref 2.5–4.5)
PLATELET # BLD AUTO: 134 K/UL — LOW (ref 150–400)
PLATELET # BLD AUTO: 134 K/UL — LOW (ref 150–400)
POTASSIUM SERPL-MCNC: 3.6 MMOL/L — SIGNIFICANT CHANGE UP (ref 3.5–5.3)
POTASSIUM SERPL-MCNC: 3.6 MMOL/L — SIGNIFICANT CHANGE UP (ref 3.5–5.3)
POTASSIUM SERPL-MCNC: 4 MMOL/L — SIGNIFICANT CHANGE UP (ref 3.5–5.3)
POTASSIUM SERPL-MCNC: 4 MMOL/L — SIGNIFICANT CHANGE UP (ref 3.5–5.3)
POTASSIUM SERPL-SCNC: 3.6 MMOL/L — SIGNIFICANT CHANGE UP (ref 3.5–5.3)
POTASSIUM SERPL-SCNC: 3.6 MMOL/L — SIGNIFICANT CHANGE UP (ref 3.5–5.3)
POTASSIUM SERPL-SCNC: 4 MMOL/L — SIGNIFICANT CHANGE UP (ref 3.5–5.3)
POTASSIUM SERPL-SCNC: 4 MMOL/L — SIGNIFICANT CHANGE UP (ref 3.5–5.3)
PROT SERPL-MCNC: 5.9 G/DL — LOW (ref 6–8.3)
PROT SERPL-MCNC: 5.9 G/DL — LOW (ref 6–8.3)
PROT SERPL-MCNC: 6 G/DL — SIGNIFICANT CHANGE UP (ref 6–8.3)
PROT SERPL-MCNC: 6 G/DL — SIGNIFICANT CHANGE UP (ref 6–8.3)
PROT UR-MCNC: 30 MG/DL
PROT UR-MCNC: 30 MG/DL
RBC # BLD: 4.85 M/UL — SIGNIFICANT CHANGE UP (ref 4.2–5.8)
RBC # BLD: 4.85 M/UL — SIGNIFICANT CHANGE UP (ref 4.2–5.8)
RBC # FLD: 14.6 % — HIGH (ref 10.3–14.5)
RBC # FLD: 14.6 % — HIGH (ref 10.3–14.5)
RBC CASTS # UR COMP ASSIST: 91 /HPF — HIGH (ref 0–4)
RBC CASTS # UR COMP ASSIST: 91 /HPF — HIGH (ref 0–4)
SAO2 % BLDMV: 72 — SIGNIFICANT CHANGE UP (ref 60–90)
SAO2 % BLDMV: 72 — SIGNIFICANT CHANGE UP (ref 60–90)
SODIUM SERPL-SCNC: 139 MMOL/L — SIGNIFICANT CHANGE UP (ref 135–145)
SODIUM SERPL-SCNC: 139 MMOL/L — SIGNIFICANT CHANGE UP (ref 135–145)
SODIUM SERPL-SCNC: 140 MMOL/L — SIGNIFICANT CHANGE UP (ref 135–145)
SODIUM SERPL-SCNC: 140 MMOL/L — SIGNIFICANT CHANGE UP (ref 135–145)
SP GR SPEC: 1.03 — SIGNIFICANT CHANGE UP (ref 1–1.03)
SP GR SPEC: 1.03 — SIGNIFICANT CHANGE UP (ref 1–1.03)
SQUAMOUS # UR AUTO: 1 /HPF — SIGNIFICANT CHANGE UP (ref 0–5)
SQUAMOUS # UR AUTO: 1 /HPF — SIGNIFICANT CHANGE UP (ref 0–5)
UROBILINOGEN FLD QL: 1 MG/DL — SIGNIFICANT CHANGE UP (ref 0.2–1)
UROBILINOGEN FLD QL: 1 MG/DL — SIGNIFICANT CHANGE UP (ref 0.2–1)
WBC # BLD: 7.56 K/UL — SIGNIFICANT CHANGE UP (ref 3.8–10.5)
WBC # BLD: 7.56 K/UL — SIGNIFICANT CHANGE UP (ref 3.8–10.5)
WBC # FLD AUTO: 7.56 K/UL — SIGNIFICANT CHANGE UP (ref 3.8–10.5)
WBC # FLD AUTO: 7.56 K/UL — SIGNIFICANT CHANGE UP (ref 3.8–10.5)
WBC UR QL: 204 /HPF — HIGH (ref 0–5)
WBC UR QL: 204 /HPF — HIGH (ref 0–5)

## 2023-11-02 PROCEDURE — 93010 ELECTROCARDIOGRAM REPORT: CPT

## 2023-11-02 PROCEDURE — 71045 X-RAY EXAM CHEST 1 VIEW: CPT | Mod: 26

## 2023-11-02 PROCEDURE — 93321 DOPPLER ECHO F-UP/LMTD STD: CPT | Mod: 26

## 2023-11-02 PROCEDURE — 93308 TTE F-UP OR LMTD: CPT | Mod: 26

## 2023-11-02 PROCEDURE — 99292 CRITICAL CARE ADDL 30 MIN: CPT | Mod: 25

## 2023-11-02 PROCEDURE — 99291 CRITICAL CARE FIRST HOUR: CPT

## 2023-11-02 PROCEDURE — 99292 CRITICAL CARE ADDL 30 MIN: CPT | Mod: GC

## 2023-11-02 PROCEDURE — 99291 CRITICAL CARE FIRST HOUR: CPT | Mod: GC

## 2023-11-02 PROCEDURE — 99222 1ST HOSP IP/OBS MODERATE 55: CPT

## 2023-11-02 RX ORDER — DEXTROSE 50 % IN WATER 50 %
25 SYRINGE (ML) INTRAVENOUS ONCE
Refills: 0 | Status: DISCONTINUED | OUTPATIENT
Start: 2023-11-02 | End: 2023-11-09

## 2023-11-02 RX ORDER — POTASSIUM CHLORIDE 20 MEQ
40 PACKET (EA) ORAL
Refills: 0 | Status: COMPLETED | OUTPATIENT
Start: 2023-11-02 | End: 2023-11-02

## 2023-11-02 RX ORDER — NOREPINEPHRINE BITARTRATE/D5W 8 MG/250ML
0.02 PLASTIC BAG, INJECTION (ML) INTRAVENOUS
Qty: 8 | Refills: 0 | Status: DISCONTINUED | OUTPATIENT
Start: 2023-11-02 | End: 2023-11-03

## 2023-11-02 RX ORDER — INSULIN GLARGINE 100 [IU]/ML
13 INJECTION, SOLUTION SUBCUTANEOUS AT BEDTIME
Refills: 0 | Status: DISCONTINUED | OUTPATIENT
Start: 2023-11-02 | End: 2023-11-03

## 2023-11-02 RX ORDER — DEXTROSE 50 % IN WATER 50 %
15 SYRINGE (ML) INTRAVENOUS ONCE
Refills: 0 | Status: DISCONTINUED | OUTPATIENT
Start: 2023-11-02 | End: 2023-11-09

## 2023-11-02 RX ORDER — CEFEPIME 1 G/1
1000 INJECTION, POWDER, FOR SOLUTION INTRAMUSCULAR; INTRAVENOUS ONCE
Refills: 0 | Status: COMPLETED | OUTPATIENT
Start: 2023-11-02 | End: 2023-11-02

## 2023-11-02 RX ORDER — DEXMEDETOMIDINE HYDROCHLORIDE IN 0.9% SODIUM CHLORIDE 4 UG/ML
0.5 INJECTION INTRAVENOUS
Qty: 200 | Refills: 0 | Status: DISCONTINUED | OUTPATIENT
Start: 2023-11-02 | End: 2023-11-03

## 2023-11-02 RX ORDER — POTASSIUM CHLORIDE 20 MEQ
20 PACKET (EA) ORAL
Refills: 0 | Status: DISCONTINUED | OUTPATIENT
Start: 2023-11-02 | End: 2023-11-02

## 2023-11-02 RX ORDER — SODIUM CHLORIDE 9 MG/ML
250 INJECTION, SOLUTION INTRAVENOUS ONCE
Refills: 0 | Status: COMPLETED | OUTPATIENT
Start: 2023-11-02 | End: 2023-11-02

## 2023-11-02 RX ORDER — DEXTROSE 50 % IN WATER 50 %
12.5 SYRINGE (ML) INTRAVENOUS ONCE
Refills: 0 | Status: DISCONTINUED | OUTPATIENT
Start: 2023-11-02 | End: 2023-11-09

## 2023-11-02 RX ORDER — CEFEPIME 1 G/1
INJECTION, POWDER, FOR SOLUTION INTRAMUSCULAR; INTRAVENOUS
Refills: 0 | Status: DISCONTINUED | OUTPATIENT
Start: 2023-11-02 | End: 2023-11-02

## 2023-11-02 RX ORDER — SODIUM CHLORIDE 9 MG/ML
1000 INJECTION, SOLUTION INTRAVENOUS
Refills: 0 | Status: DISCONTINUED | OUTPATIENT
Start: 2023-11-02 | End: 2023-11-02

## 2023-11-02 RX ORDER — CEFEPIME 1 G/1
1000 INJECTION, POWDER, FOR SOLUTION INTRAMUSCULAR; INTRAVENOUS EVERY 8 HOURS
Refills: 0 | Status: DISCONTINUED | OUTPATIENT
Start: 2023-11-02 | End: 2023-11-02

## 2023-11-02 RX ORDER — INSULIN LISPRO 100/ML
VIAL (ML) SUBCUTANEOUS EVERY 6 HOURS
Refills: 0 | Status: DISCONTINUED | OUTPATIENT
Start: 2023-11-02 | End: 2023-11-03

## 2023-11-02 RX ORDER — CHLORHEXIDINE GLUCONATE 213 G/1000ML
1 SOLUTION TOPICAL DAILY
Refills: 0 | Status: DISCONTINUED | OUTPATIENT
Start: 2023-11-02 | End: 2023-12-25

## 2023-11-02 RX ORDER — ALBUTEROL 90 UG/1
2.5 AEROSOL, METERED ORAL DAILY
Refills: 0 | Status: DISCONTINUED | OUTPATIENT
Start: 2023-11-02 | End: 2023-11-09

## 2023-11-02 RX ORDER — MONTELUKAST 4 MG/1
10 TABLET, CHEWABLE ORAL DAILY
Refills: 0 | Status: DISCONTINUED | OUTPATIENT
Start: 2023-11-02 | End: 2023-11-03

## 2023-11-02 RX ORDER — GLUCAGON INJECTION, SOLUTION 0.5 MG/.1ML
1 INJECTION, SOLUTION SUBCUTANEOUS ONCE
Refills: 0 | Status: DISCONTINUED | OUTPATIENT
Start: 2023-11-02 | End: 2023-11-14

## 2023-11-02 RX ORDER — VANCOMYCIN HCL 1 G
1500 VIAL (EA) INTRAVENOUS EVERY 12 HOURS
Refills: 0 | Status: DISCONTINUED | OUTPATIENT
Start: 2023-11-02 | End: 2023-11-02

## 2023-11-02 RX ADMIN — Medication 9.27 MICROGRAM(S)/KG/MIN: at 08:11

## 2023-11-02 RX ADMIN — TICAGRELOR 90 MILLIGRAM(S): 90 TABLET ORAL at 17:11

## 2023-11-02 RX ADMIN — DEXMEDETOMIDINE HYDROCHLORIDE IN 0.9% SODIUM CHLORIDE 11.2 MICROGRAM(S)/KG/HR: 4 INJECTION INTRAVENOUS at 16:24

## 2023-11-02 RX ADMIN — HEPARIN SODIUM 12.5 UNIT(S)/HR: 5000 INJECTION INTRAVENOUS; SUBCUTANEOUS at 19:59

## 2023-11-02 RX ADMIN — PROPOFOL 21.5 MICROGRAM(S)/KG/MIN: 10 INJECTION, EMULSION INTRAVENOUS at 19:59

## 2023-11-02 RX ADMIN — Medication 2: at 18:05

## 2023-11-02 RX ADMIN — CEFEPIME 100 MILLIGRAM(S): 1 INJECTION, POWDER, FOR SOLUTION INTRAMUSCULAR; INTRAVENOUS at 12:20

## 2023-11-02 RX ADMIN — ATORVASTATIN CALCIUM 80 MILLIGRAM(S): 80 TABLET, FILM COATED ORAL at 21:07

## 2023-11-02 RX ADMIN — HEPARIN SODIUM 12.5 UNIT(S)/HR: 5000 INJECTION INTRAVENOUS; SUBCUTANEOUS at 17:10

## 2023-11-02 RX ADMIN — HEPARIN SODIUM 10 UNIT(S)/HR: 5000 INJECTION INTRAVENOUS; SUBCUTANEOUS at 00:28

## 2023-11-02 RX ADMIN — DEXMEDETOMIDINE HYDROCHLORIDE IN 0.9% SODIUM CHLORIDE 11.2 MICROGRAM(S)/KG/HR: 4 INJECTION INTRAVENOUS at 05:19

## 2023-11-02 RX ADMIN — CHLORHEXIDINE GLUCONATE 15 MILLILITER(S): 213 SOLUTION TOPICAL at 05:18

## 2023-11-02 RX ADMIN — Medication 40 MILLIEQUIVALENT(S): at 16:20

## 2023-11-02 RX ADMIN — Medication 3.71 MICROGRAM(S)/KG/MIN: at 12:35

## 2023-11-02 RX ADMIN — Medication 3.71 MICROGRAM(S)/KG/MIN: at 19:58

## 2023-11-02 RX ADMIN — TICAGRELOR 90 MILLIGRAM(S): 90 TABLET ORAL at 05:18

## 2023-11-02 RX ADMIN — PANTOPRAZOLE SODIUM 40 MILLIGRAM(S): 20 TABLET, DELAYED RELEASE ORAL at 17:11

## 2023-11-02 RX ADMIN — Medication 300 MILLIGRAM(S): at 14:03

## 2023-11-02 RX ADMIN — CEFEPIME 100 MILLIGRAM(S): 1 INJECTION, POWDER, FOR SOLUTION INTRAMUSCULAR; INTRAVENOUS at 21:08

## 2023-11-02 RX ADMIN — MONTELUKAST 10 MILLIGRAM(S): 4 TABLET, CHEWABLE ORAL at 12:20

## 2023-11-02 RX ADMIN — Medication 81 MILLIGRAM(S): at 12:20

## 2023-11-02 RX ADMIN — SODIUM CHLORIDE 500 MILLILITER(S): 9 INJECTION, SOLUTION INTRAVENOUS at 22:02

## 2023-11-02 RX ADMIN — Medication 40 MILLIEQUIVALENT(S): at 17:11

## 2023-11-02 RX ADMIN — PANTOPRAZOLE SODIUM 40 MILLIGRAM(S): 20 TABLET, DELAYED RELEASE ORAL at 05:18

## 2023-11-02 RX ADMIN — PROPOFOL 21.5 MICROGRAM(S)/KG/MIN: 10 INJECTION, EMULSION INTRAVENOUS at 08:12

## 2023-11-02 RX ADMIN — Medication 40 MILLIEQUIVALENT(S): at 00:28

## 2023-11-02 RX ADMIN — ALBUTEROL 2.5 MILLIGRAM(S): 90 AEROSOL, METERED ORAL at 14:51

## 2023-11-02 RX ADMIN — PROPOFOL 21.5 MICROGRAM(S)/KG/MIN: 10 INJECTION, EMULSION INTRAVENOUS at 12:20

## 2023-11-02 RX ADMIN — INSULIN GLARGINE 13 UNIT(S): 100 INJECTION, SOLUTION SUBCUTANEOUS at 23:17

## 2023-11-02 RX ADMIN — CHLORHEXIDINE GLUCONATE 1 APPLICATION(S): 213 SOLUTION TOPICAL at 23:19

## 2023-11-02 RX ADMIN — HEPARIN SODIUM 12 UNIT(S)/HR: 5000 INJECTION INTRAVENOUS; SUBCUTANEOUS at 08:13

## 2023-11-02 NOTE — PROGRESS NOTE ADULT - ATTENDING COMMENTS
Patient is a 59 yo M with HTN, CAD (s/p PCI 2008), HFrEF (EF severely reduced 2018) not on GDMT, CVA 2018 (requiring tpA), DM presenting with chest pressure - with course c/b acute systolic heart failure exacerbation requiring urgent intubation prior to his right/left heart cath    [facility] 11/1 found to have chronic total occlusion of LAD and RCA  RHC with elevated RA and PA pressures and CI 1.14 - s/p IABP for mechanical support  SHOCK call was initiated - based upon objective data at that time it was decided to continue with the current course - intubation/IABP/diuresis  echo 11/1 w/ severely decreased EF 32%, s/p IABP 11/1, likely in cardiogenic shock 2/2 acute on chronic HF s/p unknown shockable rhythm. Patient is a 57 yo M with HTN, CAD (s/p PCI 2008), HFrEF (EF severely reduced 2018) not on GDMT, CVA 2018 (requiring tpA), DM presenting with chest pressure - with course c/b acute systolic heart failure exacerbation requiring urgent intubation prior to his right/left heart cath    [facility] 11/1 found to have chronic total occlusion of LAD and RCA  RHC with elevated RA and PA pressures and CI 1.14 - s/p IABP for mechanical support  SHOCK call was initiated - based upon objective data at that time it was decided to continue with the current course - intubation/IABP/diuresis  echo 11/1 w/ severely decreased EF 32%, s/p IABP 11/1, likely in cardiogenic shock 2/2 acute on chronic HF   pt was found to have a COVID infection simultaneously as well  MRI viability study will be done once the IABP is removed  SBT trial today    Patient is critically ill, requiring critical care services. Despite the current condition, the patient is at a high risk of clinical and hemodynamic demise

## 2023-11-02 NOTE — PROGRESS NOTE ADULT - ASSESSMENT
57 yo male h/o htn, cad s/p pci, ICH, here with NSTEMI  s/p intubation and cath. now in CCU    NSTEMI  s/p intubation and cath  cath results noted. multi vessel dz., CTSx f/u. 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  Advanced care planning forms were reviewed and discussed as appropriate.  Differential diagnosis and plan of care discussed with patient after the evaluation.   Pain assessed and judicious use of narcotics when appropriate was discussed.  Importance of Fall prevention discussed.  Counseling on Smoking and Alcohol cessation was offered when appropriate.  Counseling on Diet, exercise, and medication compliance was done.       Approx 75 minutes spent.

## 2023-11-02 NOTE — CONSULT NOTE ADULT - SUBJECTIVE AND OBJECTIVE BOX
ADVANCED HEART FAILURE & TRANSPLANT  - PROGRESS NOTE  *To reach the NS2 Team from 8am to 5pm (MON-FRI), please call [phone].   _______________________________________________________________________________________________________    HPI:  pt seen and approx 1:30 pm  in [facility]    57yo M w/ hx HTN, CAD w/ 1 stent in , ICH () presenting with abn ekg. Patient presented to [facility] where he was found to have STEMI, recommended to get cath however patient did not want to get it there so it left and came here.  Patient initially had cough, congestion, fever, was placed on antibiotics on .  Started feeling nauseous and had a presyncopal event after which he presented to ED last night.  Had chest pain as well.  Chest pain is midsternal.  Not currently having chest pain.  Received 4 aspirin 30 min pta. (2023 15:11)      PAST MEDICAL & SURGICAL HISTORY:  HTN (hypertension)      CAD (coronary artery disease)  ; stent      Intracranial hemorrhage        Respiratory arrest        Myocardial infarction, unspecified MI type, unspecified artery      History of coronary artery stent placement          REVIEW OF SYSTEMS      General:	    Skin/Breast:  	  Ophthalmologic:  	  ENMT:	    Respiratory and Thorax:  	  Cardiovascular:	    Gastrointestinal:	    Genitourinary:	    Musculoskeletal:	    Neurological:	    Psychiatric:	    Hematology/Lymphatics:	    Endocrine:	    Allergic/Immunologic:	    MEDICATIONS  (STANDING):  aspirin  chewable 81 milliGRAM(s) Oral daily  atorvastatin 80 milliGRAM(s) Oral at bedtime  chlorhexidine 0.12% Liquid 15 milliLiter(s) Oral Mucosa every 12 hours  dexMEDEtomidine Infusion 0.5 MICROgram(s)/kG/Hr (11.2 mL/Hr) IV Continuous <Continuous>  dextrose 5%. 1000 milliLiter(s) (50 mL/Hr) IV Continuous <Continuous>  dextrose 5%. 1000 milliLiter(s) (100 mL/Hr) IV Continuous <Continuous>  dextrose 50% Injectable 12.5 Gram(s) IV Push once  dextrose 50% Injectable 25 Gram(s) IV Push once  dextrose 50% Injectable 25 Gram(s) IV Push once  glucagon  Injectable 1 milliGRAM(s) IntraMuscular once  heparin  Infusion 1000 Unit(s)/Hr (12 mL/Hr) IV Continuous <Continuous>  insulin glargine Injectable (LANTUS) 13 Unit(s) SubCutaneous at bedtime  insulin lispro (ADMELOG) corrective regimen sliding scale   SubCutaneous every 6 hours  levoFLOXacin  Tablet 500 milliGRAM(s) Oral every 24 hours  norepinephrine Infusion 0.05 MICROgram(s)/kG/Min (9.27 mL/Hr) IV Continuous <Continuous>  pantoprazole  Injectable 40 milliGRAM(s) IV Push every 12 hours  propofol Infusion 40 MICROgram(s)/kG/Min (21.5 mL/Hr) IV Continuous <Continuous>  ticagrelor 90 milliGRAM(s) Oral every 12 hours    MEDICATIONS  (PRN):  dextrose Oral Gel 15 Gram(s) Oral once PRN Blood Glucose LESS THAN 70 milliGRAM(s)/deciliter      Allergies    penicillins (Unknown)    Intolerances        SOCIAL HISTORY:    FAMILY HISTORY:  Family history of meningitis (Sibling)  Brother, death at age 49 from complications of infection (2015)    Family history of hypertension in mother  Mother        Vital Signs Last 24 Hrs  T(C): 36.6 (2023 04:00), Max: 36.8 (2023 10:00)  T(F): 97.9 (2023 04:00), Max: 98.2 (2023 10:00)  HR: 74 (2023 07:00) (69 - 88)  BP: 127/82 (2023 12:26) (115/95 - 127/82)  BP(mean): 100 (2023 12:26) (100 - 101)  RR: 16 (2023 07:00) (7 - 41)  SpO2: 97% (2023 07:00) (94% - 100%)    Parameters below as of 2023 04:00  Patient On (Oxygen Delivery Method): ventilator  O2 Flow (L/min): 30      PHYSICAL EXAM:      Constitutional:    Eyes:    ENMT:    Neck:    Breasts:    Back:    Respiratory:    Cardiovascular:    Gastrointestinal:    Genitourinary:    Rectal:    Extremities:    Vascular:    Neurological:    Skin:    Lymph Nodes:    Musculoskeletal:    Psychiatric:        LABS:                        13.8   7.56  )-----------( 134      ( 2023 05:34 )             41.3     11    139  |  107  |  21  ----------------------------<  110<H>  4.0   |  18<L>  |  1.09    Ca    8.0<L>      2023 02:48  Phos  3.4     11-  Mg     2.0         TPro  5.9<L>  /  Alb  3.3  /  TBili  0.4  /  DBili  x   /  AST  139<H>  /  ALT  65<H>  /  AlkPhos  42  11    PT/INR - ( 2023 22:22 )   PT: 12.9 sec;   INR: 1.18 ratio         PTT - ( 2023 05:34 )  PTT:25.9 sec  Urinalysis Basic - ( 2023 03:58 )    Color: Yellow / Appearance: Turbid / S.030 / pH: x  Gluc: x / Ketone: 15 mg/dL  / Bili: Negative / Urobili: 1.0 mg/dL   Blood: x / Protein: 30 mg/dL / Nitrite: Negative   Leuk Esterase: Moderate / RBC: 91 /HPF /  /HPF   Sq Epi: x / Non Sq Epi: 1 /HPF / Bacteria: Occasional /HPF        RADIOLOGY & ADDITIONAL STUDIES: ADVANCED HEART FAILURE & TRANSPLANT  - PROGRESS NOTE  *To reach the NS2 Team from 8am to 5pm (MON-FRI), please call [phone].   _______________________________________________________________________________________________________    HPI:  58M with HTN, CAD (s/p PCI ), ICH , HFrEF (EF severely reduced ) presenting with chest pressure. He notes that he was feeling SOB for the last week, was told he had bilateral pneumonia and started on antibiotics. Has been coughing and having intermittent chest pressure. On the night of presentation, he states that he was diaphoretic, nauseous, and leg tingling. Patient is a poor historian, but per family he passed out briefly for a few seconds and had a heart rate of 180s. EMS was called, who defibrillated patient at his home, no strips available. Patient taken to [facility], loaded with ASA. Patient left [place] because he wanted to be seen at [facility].    On arrival, EKG with ST depressions in lateral leads, LVH, ST elevations in anterior leads not meeting STEMI criteria. Not complaining of chest pain. Troponin 440 -> 414. Pro-BNP 4168. Creatinine 1.25, AST/ALT 94/50.  Was planned to undergo cath, pt was dyspneic and diaphoretic so was intubated. Brought to lab and [facility] showed pLAD 70 % stenosis in the ostium portion of the segment and 100 % in-stent restenosis and in mRCA, 100 % stenosis. Also found to have elevated R atrial pressure (mRA 23mmHg with a V wave of 24mmHg), moderate post-capillary pulmonary hypertension (sPAP 52mmHg, dPAP 33mmHg, mPAP 40mmHg), PCWP = 30mmHg with a V wave of 33mmHg, AO = 85/66/73. IABP was placed during the cath through R common femoral artery. Hf consulted for further managment      PAST MEDICAL & SURGICAL HISTORY:  HTN (hypertension)  CAD (coronary artery disease)  ; stent  Intracranial hemorrhage    Respiratory arrest    Myocardial infarction, unspecified MI type, unspecified artery  History of coronary artery stent placement    REVIEW OF SYSTEMS  14-point review as stated above    MEDICATIONS  (STANDING):  aspirin  chewable 81 milliGRAM(s) Oral daily  atorvastatin 80 milliGRAM(s) Oral at bedtime  chlorhexidine 0.12% Liquid 15 milliLiter(s) Oral Mucosa every 12 hours  dexMEDEtomidine Infusion 0.5 MICROgram(s)/kG/Hr (11.2 mL/Hr) IV Continuous <Continuous>  dextrose 5%. 1000 milliLiter(s) (50 mL/Hr) IV Continuous <Continuous>  dextrose 5%. 1000 milliLiter(s) (100 mL/Hr) IV Continuous <Continuous>  dextrose 50% Injectable 12.5 Gram(s) IV Push once  dextrose 50% Injectable 25 Gram(s) IV Push once  dextrose 50% Injectable 25 Gram(s) IV Push once  glucagon  Injectable 1 milliGRAM(s) IntraMuscular once  heparin  Infusion 1000 Unit(s)/Hr (12 mL/Hr) IV Continuous <Continuous>  insulin glargine Injectable (LANTUS) 13 Unit(s) SubCutaneous at bedtime  insulin lispro (ADMELOG) corrective regimen sliding scale   SubCutaneous every 6 hours  levoFLOXacin  Tablet 500 milliGRAM(s) Oral every 24 hours  norepinephrine Infusion 0.05 MICROgram(s)/kG/Min (9.27 mL/Hr) IV Continuous <Continuous>  pantoprazole  Injectable 40 milliGRAM(s) IV Push every 12 hours  propofol Infusion 40 MICROgram(s)/kG/Min (21.5 mL/Hr) IV Continuous <Continuous>  ticagrelor 90 milliGRAM(s) Oral every 12 hours    MEDICATIONS  (PRN):  dextrose Oral Gel 15 Gram(s) Oral once PRN Blood Glucose LESS THAN 70 milliGRAM(s)/deciliter    Allergies  penicillins (Unknown)  Intolerances    SOCIAL HISTORY:    FAMILY HISTORY:  Family history of meningitis (Sibling)  Brother, death at age 49 from complications of infection (2015)    Family history of hypertension in mother  Mother    Vital Signs Last 24 Hrs  T(C): 36.6 (2023 04:00), Max: 36.8 (2023 10:00)  T(F): 97.9 (2023 04:00), Max: 98.2 (2023 10:00)  HR: 74 (2023 07:00) (69 - 88)  BP: 127/82 (2023 12:26) (115/95 - 127/82)  BP(mean): 100 (2023 12:26) (100 - 101)  RR: 16 (2023 07:00) (7 - 41)  SpO2: 97% (2023 07:00) (94% - 100%)    Parameters below as of 2023 04:00  Patient On (Oxygen Delivery Method): ventilator  O2 Flow (L/min): 30      PHYSICAL EXAM:          LABS:                        13.8   7.56  )-----------( 134      ( 2023 05:34 )             41.3     11-02    139  |  107  |  21  ----------------------------<  110<H>  4.0   |  18<L>  |  1.09    Ca    8.0<L>      2023 02:48  Phos  3.4     11-02  Mg     2.0     11-02    TPro  5.9<L>  /  Alb  3.3  /  TBili  0.4  /  DBili  x   /  AST  139<H>  /  ALT  65<H>  /  AlkPhos  42  11-02  PT/INR - ( 2023 22:22 )   PT: 12.9 sec;   INR: 1.18 ratio    PTT - ( 2023 05:34 )  PTT:25.9 sec  Urinalysis Basic - ( 2023 03:58 )    Color: Yellow / Appearance: Turbid / S.030 / pH: x  Gluc: x / Ketone: 15 mg/dL  / Bili: Negative / Urobili: 1.0 mg/dL   Blood: x / Protein: 30 mg/dL / Nitrite: Negative   Leuk Esterase: Moderate / RBC: 91 /HPF /  /HPF   Sq Epi: x / Non Sq Epi: 1 /HPF / Bacteria: Occasional /HPF    RADIOLOGY & ADDITIONAL STUDIES: ADVANCED HEART FAILURE & TRANSPLANT  - PROGRESS NOTE  *To reach the NS2 Team from 8am to 5pm (MON-FRI), please call [phone].   _______________________________________________________________________________________________________    HPI:  58M with HTN, CAD (s/p PCI ), ICH , HFrEF (EF severely reduced ) presenting with chest pressure. He notes that he was feeling SOB for the last week, was told he had bilateral pneumonia and started on antibiotics. Has been coughing and having intermittent chest pressure. On the night of presentation, he states that he was diaphoretic, nauseous, and leg tingling. Patient is a poor historian, but per family he passed out briefly for a few seconds and had a heart rate of 180s. EMS was called, who defibrillated patient at his home, no strips available. Patient taken to [facility], loaded with ASA. Patient left [place] because he wanted to be seen at [facility].    On arrival, EKG with ST depressions in lateral leads, LVH, ST elevations in anterior leads not meeting STEMI criteria. Not complaining of chest pain. Troponin 440 -> 414. Pro-BNP 4168. Creatinine 1.25, AST/ALT 94/50.  Was planned to undergo cath, pt was dyspneic and diaphoretic so was intubated. Brought to lab and C showed pLAD 70 % stenosis in the ostium portion of the segment and 100 % in-stent restenosis and in mRCA, 100 % stenosis. Also found to have elevated filling pressures and marginal perfusion indices. Therefore,  IABP was placed during the cath through R common femoral artery. He was transferred to the CICU and HF consulted for further management      PAST MEDICAL & SURGICAL HISTORY:  HTN (hypertension)  CAD (coronary artery disease)  ; stent  Intracranial hemorrhage    Respiratory arrest    Myocardial infarction, unspecified MI type, unspecified artery  History of coronary artery stent placement    REVIEW OF SYSTEMS  14 point ROS done and found to be negative or noncontributory other than noted in HPI.     MEDICATIONS  (STANDING):  aspirin  chewable 81 milliGRAM(s) Oral daily  atorvastatin 80 milliGRAM(s) Oral at bedtime  chlorhexidine 0.12% Liquid 15 milliLiter(s) Oral Mucosa every 12 hours  dexMEDEtomidine Infusion 0.5 MICROgram(s)/kG/Hr (11.2 mL/Hr) IV Continuous <Continuous>  dextrose 5%. 1000 milliLiter(s) (50 mL/Hr) IV Continuous <Continuous>  dextrose 5%. 1000 milliLiter(s) (100 mL/Hr) IV Continuous <Continuous>  dextrose 50% Injectable 12.5 Gram(s) IV Push once  dextrose 50% Injectable 25 Gram(s) IV Push once  dextrose 50% Injectable 25 Gram(s) IV Push once  glucagon  Injectable 1 milliGRAM(s) IntraMuscular once  heparin  Infusion 1000 Unit(s)/Hr (12 mL/Hr) IV Continuous <Continuous>  insulin glargine Injectable (LANTUS) 13 Unit(s) SubCutaneous at bedtime  insulin lispro (ADMELOG) corrective regimen sliding scale   SubCutaneous every 6 hours  levoFLOXacin  Tablet 500 milliGRAM(s) Oral every 24 hours  norepinephrine Infusion 0.05 MICROgram(s)/kG/Min (9.27 mL/Hr) IV Continuous <Continuous>  pantoprazole  Injectable 40 milliGRAM(s) IV Push every 12 hours  propofol Infusion 40 MICROgram(s)/kG/Min (21.5 mL/Hr) IV Continuous <Continuous>  ticagrelor 90 milliGRAM(s) Oral every 12 hours    MEDICATIONS  (PRN):  dextrose Oral Gel 15 Gram(s) Oral once PRN Blood Glucose LESS THAN 70 milliGRAM(s)/deciliter    Allergies  penicillins (Unknown)  Intolerances    SOCIAL HISTORY:    FAMILY HISTORY:  Family history of meningitis (Sibling)  Brother, death at age 49 from complications of infection (2015)    Family history of hypertension in mother  Mother    Vital Signs Last 24 Hrs  T(C): 36.6 (2023 04:00), Max: 36.8 (2023 10:00)  T(F): 97.9 (2023 04:00), Max: 98.2 (2023 10:00)  HR: 74 (2023 07:00) (69 - 88)  BP: 127/82 (2023 12:26) (115/95 - 127/82)  BP(mean): 100 (2023 12:26) (100 - 101)  RR: 16 (2023 07:00) (7 - 41)  SpO2: 97% (2023 07:00) (94% - 100%)    Parameters below as of 2023 04:00  Patient On (Oxygen Delivery Method): ventilator  O2 Flow (L/min): 30      PHYSICAL EXAM  General: Sedated and intubated  HEENT: EOM intact.  Neck: JVP mildly elevated  Chest: Clear to auscultation bilaterally  CV: Normal S1 and S2. No murmurs, rub, or gallops. Radial pulses normal, warm peripherally  Abdomen: Soft, non-distended, non-tender  Skin: No rashes or skin breakdown  Extremities: No LE edema  Neurology:Sedated  Psych: Sedated        LABS:                        13.8   7.56  )-----------( 134      ( 2023 05:34 )             41.3     11-02    139  |  107  |  21  ----------------------------<  110<H>  4.0   |  18<L>  |  1.09    Ca    8.0<L>      2023 02:48  Phos  3.4     11-02  Mg     2.0     11-02    TPro  5.9<L>  /  Alb  3.3  /  TBili  0.4  /  DBili  x   /  AST  139<H>  /  ALT  65<H>  /  AlkPhos  42  11-02  PT/INR - ( 2023 22:22 )   PT: 12.9 sec;   INR: 1.18 ratio    PTT - ( 2023 05:34 )  PTT:25.9 sec  Urinalysis Basic - ( 2023 03:58 )    Color: Yellow / Appearance: Turbid / S.030 / pH: x  Gluc: x / Ketone: 15 mg/dL  / Bili: Negative / Urobili: 1.0 mg/dL   Blood: x / Protein: 30 mg/dL / Nitrite: Negative   Leuk Esterase: Moderate / RBC: 91 /HPF /  /HPF   Sq Epi: x / Non Sq Epi: 1 /HPF / Bacteria: Occasional /HPF    RADIOLOGY & ADDITIONAL STUDIES:

## 2023-11-02 NOTE — CONSULT NOTE ADULT - SUBJECTIVE AND OBJECTIVE BOX
HPI:    57 yo M with PMHx of HTN, CAD w/ 1 stent in 2009, ICH (2008) presented on 11/1 with abn ekg. Patient presented to [facility] where he was found to have STEMI, recommended to get cath however patient did not want to get it there so he left and came here. Patient initially had cough, congestion, fever, was placed on antibiotics on Sunday. Started feeling nauseous and had a presyncopal event after which he presented to ED last night.  Had chest pain as well.  Chest pain is midsternal.  Not currently having chest pain.  Received 4 aspirin 30 min pta.        REVIEW OF SYSTEMS  [  ] ROS unobtainable because:    [  ] All other systems negative except as noted below    Constitutional:  [ ] fever [ ] chills  [ ] weight loss  [ ]night sweat  [ ]poor appetite/PO intake [ ]fatigue   Skin:  [ ] rash [ ] phlebitis	  Eyes: [ ] icterus [ ] pain  [ ] discharge	  ENMT: [ ] sore throat  [ ] thrush [ ] ulcers [ ] exudates [ ]anosmia  Respiratory: [ ] dyspnea [ ] hemoptysis [ ] cough [ ] sputum	  Cardiovascular:  [ ] chest pain [ ] palpitations [ ] edema	  Gastrointestinal:  [ ] nausea [ ] vomiting [ ] diarrhea [ ] constipation [ ] pain	  Genitourinary:  [ ] dysuria [ ] frequency [ ] hematuria [ ] discharge [ ] flank pain  [ ] incontinence  Musculoskeletal:  [ ] myalgias [ ] arthralgias [ ] arthritis  [ ] back pain  Neurological:  [ ] headache [ ] weakness [ ] seizures  [ ] confusion/altered mental status    prior hospital charts reviewed [V]  primary team notes reviewed [V]  other consultant notes reviewed [V]    PAST MEDICAL & SURGICAL HISTORY:  HTN (hypertension)    CAD (coronary artery disease)  2009; stent    Intracranial hemorrhage  2008    Respiratory arrest  december 1st    Myocardial infarction, unspecified MI type, unspecified artery    History of coronary artery stent placement    SOCIAL HISTORY:  - Denied smoking/vaping/alcohol/recreational drug use    FAMILY HISTORY:  Family history of meningitis (Sibling)  Brother, death at age 49 from complications of infection (June 2015)    Family history of hypertension in mother  Mother    Allergies  penicillins (Unknown)    ANTIMICROBIALS:  cefepime   IVPB    cefepime   IVPB 1000 every 8 hours  vancomycin  IVPB 1500 every 12 hours    ANTIMICROBIALS (past 90 days):  MEDICATIONS  (STANDING):  cefepime   IVPB   100 mL/Hr IV Intermittent (11-02-23 @ 12:20)    vancomycin  IVPB   300 mL/Hr IV Intermittent (11-02-23 @ 14:03)    OTHER MEDS:   MEDICATIONS  (STANDING):  albuterol    0.083% 2.5 daily  aspirin  chewable 81 daily  atorvastatin 80 at bedtime  dexMEDEtomidine Infusion 0.5 <Continuous>  dextrose 50% Injectable 12.5 once  dextrose 50% Injectable 25 once  dextrose 50% Injectable 25 once  dextrose Oral Gel 15 once PRN  glucagon  Injectable 1 once  heparin  Infusion 1000 <Continuous>  insulin glargine Injectable (LANTUS) 13 at bedtime  insulin lispro (ADMELOG) corrective regimen sliding scale  every 6 hours  montelukast 10 daily  norepinephrine Infusion 0.02 <Continuous>  pantoprazole  Injectable 40 every 12 hours  propofol Infusion 40 <Continuous>  ticagrelor 90 every 12 hours    VITALS:  Vital Signs Last 24 Hrs  T(F): 97.5 (11-02-23 @ 15:00), Max: 99 (11-02-23 @ 08:00)    Vital Signs Last 24 Hrs  HR: 76 (11-02-23 @ 15:00) (69 - 80)  BP: --  RR: 16 (11-02-23 @ 15:00)  SpO2: 97% (11-02-23 @ 15:00) (94% - 100%)  Wt(kg): --    EXAM:  Physical Exam:  Constitutional:  well preserved, comfortable  Head/Eyes: no icterus, PERRL, EOMI  ENT:  supple; no thrush  LUNGS:  CTA  CVS:  normal S1, S2, no murmur  Abd:  soft, non-tender; non-distended  Ext:  no edema  Vascular:  IV site no erythema tenderness or discharge  MSK:  joints without swelling  Neuro: AAO X 3, non- focal    Labs:                        13.8   7.56  )-----------( 134      ( 02 Nov 2023 05:34 )             41.3     11-02    140  |  107  |  19  ----------------------------<  140<H>  3.6   |  19<L>  |  1.08    Ca    8.1<L>      02 Nov 2023 14:20  Phos  2.8     11-02  Mg     2.1     11-02    TPro  6.0  /  Alb  3.3  /  TBili  0.4  /  DBili  x   /  AST  109<H>  /  ALT  66<H>  /  AlkPhos  45  11-02    WBC Trend:  WBC Count: 7.56 (11-02-23 @ 05:34)  WBC Count: 6.53 (11-01-23 @ 22:22)  WBC Count: 8.24 (11-01-23 @ 06:14)    Auto Neutrophil #: 5.29 K/uL (11-02-23 @ 05:34)  Auto Neutrophil #: 4.44 K/uL (11-01-23 @ 22:22)  Auto Neutrophil #: 6.09 K/uL (11-01-23 @ 06:14)    Creatine Trend:  Creatinine: 1.08 (11-02)  Creatinine: 1.09 (11-02)  Creatinine: 1.12 (11-01)  Creatinine: 1.25 (11-01)    Liver Biochemical Testing Trend:  Alanine Aminotransferase (ALT/SGPT): 66 *H* (11-02)  Alanine Aminotransferase (ALT/SGPT): 65 *H* (11-02)  Alanine Aminotransferase (ALT/SGPT): 50 *H* (11-01)  Aspartate Aminotransferase (AST/SGOT): 109 (11-02-23 @ 14:20)  Aspartate Aminotransferase (AST/SGOT): 139 (11-02-23 @ 02:48)  Aspartate Aminotransferase (AST/SGOT): 94 (11-01-23 @ 06:14)  Bilirubin Total: 0.4 (11-02)  Bilirubin Total: 0.4 (11-02)  Bilirubin Total: 0.3 (11-01)    Trend LDH    Auto Eosinophil %: 1.6 % (11-02-23 @ 05:34)  Auto Eosinophil %: 0.5 % (11-01-23 @ 22:22)  Auto Eosinophil %: 0.1 % (11-01-23 @ 06:14)    Urinalysis Basic - ( 02 Nov 2023 14:20 )    Color: x / Appearance: x / SG: x / pH: x  Gluc: 140 mg/dL / Ketone: x  / Bili: x / Urobili: x   Blood: x / Protein: x / Nitrite: x   Leuk Esterase: x / RBC: x / WBC x   Sq Epi: x / Non Sq Epi: x / Bacteria: x    MICROBIOLOGY:    Rapid RVP Result: Detected (11-01 @ 13:11)    Troponin T, High Sensitivity Result: 414 (11-01)  Troponin T, High Sensitivity Result: 440 (11-01)    Blood Gas Arterial, Lactate: 1.0 (11-02 @ 02:45)  Blood Gas Arterial, Lactate: 1.1 (11-01 @ 21:37)  Blood Gas Arterial, Lactate: 1.3 (11-01 @ 17:04)  Blood Gas Arterial, Lactate: 3.7 (11-01 @ 15:06)    A1C with Estimated Average Glucose Result: 8.3 % (11-01-23 @ 06:14)    RADIOLOGY:  imaging below personally reviewed    < from: Xray Chest 1 View- PORTABLE-Urgent (11.01.23 @ 07:42) >  IMPRESSION:  Clear lungs.    ---End of Report ---    < end of copied text >         HPI:    59 yo M with PMHx of HTN, CAD w/ 1 stent in 2009, ICH (2008) presented on 11/1 with abn ekg. Patient presented to [facility] where he was found to have STEMI, recommended to get cath however patient did not want to get it there so he left and came here.   Prior to [facility] found to be tachycardic, dyspneic, intubated, C 11/1 with chronic total occlusion of LAD and RCA, with elevated RA and PA pressures. TTE 11/1 with severely decreased EF 32%, s/p IABP 11/1.  Reportedly patient was treated for pneumonia as outpatient with levofloxacin. Tested + for Covid.    ID consulted for further recommendations.     REVIEW OF SYSTEMS  [X] ROS unobtainable because:  intubated   [  ] All other systems negative except as noted below    Constitutional:  [ ] fever [ ] chills  [ ] weight loss  [ ]night sweat  [ ]poor appetite/PO intake [ ]fatigue   Skin:  [ ] rash [ ] phlebitis	  Eyes: [ ] icterus [ ] pain  [ ] discharge	  ENMT: [ ] sore throat  [ ] thrush [ ] ulcers [ ] exudates [ ]anosmia  Respiratory: [ ] dyspnea [ ] hemoptysis [ ] cough [ ] sputum	  Cardiovascular:  [ ] chest pain [ ] palpitations [ ] edema	  Gastrointestinal:  [ ] nausea [ ] vomiting [ ] diarrhea [ ] constipation [ ] pain	  Genitourinary:  [ ] dysuria [ ] frequency [ ] hematuria [ ] discharge [ ] flank pain  [ ] incontinence  Musculoskeletal:  [ ] myalgias [ ] arthralgias [ ] arthritis  [ ] back pain  Neurological:  [ ] headache [ ] weakness [ ] seizures  [ ] confusion/altered mental status    prior hospital charts reviewed [V]  primary team notes reviewed [V]  other consultant notes reviewed [V]    PAST MEDICAL & SURGICAL HISTORY:  HTN (hypertension)    CAD (coronary artery disease)  2009; stent    Intracranial hemorrhage  2008    Respiratory arrest  december 1st    Myocardial infarction, unspecified MI type, unspecified artery    History of coronary artery stent placement    SOCIAL HISTORY:  - Denied smoking/vaping/alcohol/recreational drug use    FAMILY HISTORY:  Family history of meningitis (Sibling)  Brother, death at age 49 from complications of infection (June 2015)    Family history of hypertension in mother  Mother    Allergies  penicillins (Unknown)    ANTIMICROBIALS:  cefepime   IVPB    cefepime   IVPB 1000 every 8 hours  vancomycin  IVPB 1500 every 12 hours    ANTIMICROBIALS (past 90 days):  MEDICATIONS  (STANDING):  cefepime   IVPB   100 mL/Hr IV Intermittent (11-02-23 @ 12:20)    vancomycin  IVPB   300 mL/Hr IV Intermittent (11-02-23 @ 14:03)    OTHER MEDS:   MEDICATIONS  (STANDING):  albuterol    0.083% 2.5 daily  aspirin  chewable 81 daily  atorvastatin 80 at bedtime  dexMEDEtomidine Infusion 0.5 <Continuous>  dextrose 50% Injectable 12.5 once  dextrose 50% Injectable 25 once  dextrose 50% Injectable 25 once  dextrose Oral Gel 15 once PRN  glucagon  Injectable 1 once  heparin  Infusion 1000 <Continuous>  insulin glargine Injectable (LANTUS) 13 at bedtime  insulin lispro (ADMELOG) corrective regimen sliding scale  every 6 hours  montelukast 10 daily  norepinephrine Infusion 0.02 <Continuous>  pantoprazole  Injectable 40 every 12 hours  propofol Infusion 40 <Continuous>  ticagrelor 90 every 12 hours    VITALS:  Vital Signs Last 24 Hrs  T(F): 97.5 (11-02-23 @ 15:00), Max: 99 (11-02-23 @ 08:00)    Vital Signs Last 24 Hrs  HR: 76 (11-02-23 @ 15:00) (69 - 80)  BP: --  RR: 16 (11-02-23 @ 15:00)  SpO2: 97% (11-02-23 @ 15:00) (94% - 100%)  Wt(kg): --    EXAM:  Physical Exam:  Constitutional:  sedated  Head/Eyes: no icterus, PERRL, EOMI  ENT:  supple; no thrush  LUNGS:  intubated   CVS:  normal S1, S2, no murmur  Abd:  soft, non-tender; non-distended  Ext:  no edema  Vascular: R femoral rangel   MSK:  joints without swelling  Neuro: sedated,    Labs:                        13.8   7.56  )-----------( 134      ( 02 Nov 2023 05:34 )             41.3     11-02    140  |  107  |  19  ----------------------------<  140<H>  3.6   |  19<L>  |  1.08    Ca    8.1<L>      02 Nov 2023 14:20  Phos  2.8     11-02  Mg     2.1     11-02    TPro  6.0  /  Alb  3.3  /  TBili  0.4  /  DBili  x   /  AST  109<H>  /  ALT  66<H>  /  AlkPhos  45  11-02    WBC Trend:  WBC Count: 7.56 (11-02-23 @ 05:34)  WBC Count: 6.53 (11-01-23 @ 22:22)  WBC Count: 8.24 (11-01-23 @ 06:14)    Auto Neutrophil #: 5.29 K/uL (11-02-23 @ 05:34)  Auto Neutrophil #: 4.44 K/uL (11-01-23 @ 22:22)  Auto Neutrophil #: 6.09 K/uL (11-01-23 @ 06:14)    Creatine Trend:  Creatinine: 1.08 (11-02)  Creatinine: 1.09 (11-02)  Creatinine: 1.12 (11-01)  Creatinine: 1.25 (11-01)    Liver Biochemical Testing Trend:  Alanine Aminotransferase (ALT/SGPT): 66 *H* (11-02)  Alanine Aminotransferase (ALT/SGPT): 65 *H* (11-02)  Alanine Aminotransferase (ALT/SGPT): 50 *H* (11-01)  Aspartate Aminotransferase (AST/SGOT): 109 (11-02-23 @ 14:20)  Aspartate Aminotransferase (AST/SGOT): 139 (11-02-23 @ 02:48)  Aspartate Aminotransferase (AST/SGOT): 94 (11-01-23 @ 06:14)  Bilirubin Total: 0.4 (11-02)  Bilirubin Total: 0.4 (11-02)  Bilirubin Total: 0.3 (11-01)    Trend LDH    Auto Eosinophil %: 1.6 % (11-02-23 @ 05:34)  Auto Eosinophil %: 0.5 % (11-01-23 @ 22:22)  Auto Eosinophil %: 0.1 % (11-01-23 @ 06:14)    Urinalysis Basic - ( 02 Nov 2023 14:20 )    Color: x / Appearance: x / SG: x / pH: x  Gluc: 140 mg/dL / Ketone: x  / Bili: x / Urobili: x   Blood: x / Protein: x / Nitrite: x   Leuk Esterase: x / RBC: x / WBC x   Sq Epi: x / Non Sq Epi: x / Bacteria: x    MICROBIOLOGY:    Rapid RVP Result: Detected (11-01 @ 13:11)    Troponin T, High Sensitivity Result: 414 (11-01)  Troponin T, High Sensitivity Result: 440 (11-01)    Blood Gas Arterial, Lactate: 1.0 (11-02 @ 02:45)  Blood Gas Arterial, Lactate: 1.1 (11-01 @ 21:37)  Blood Gas Arterial, Lactate: 1.3 (11-01 @ 17:04)  Blood Gas Arterial, Lactate: 3.7 (11-01 @ 15:06)    A1C with Estimated Average Glucose Result: 8.3 % (11-01-23 @ 06:14)    RADIOLOGY:  imaging below personally reviewed    < from: Xray Chest 1 View- PORTABLE-Urgent (11.01.23 @ 07:42) >  IMPRESSION:  Clear lungs.    ---End of Report ---    < end of copied text >

## 2023-11-02 NOTE — PROGRESS NOTE ADULT - SUBJECTIVE AND OBJECTIVE BOX
PATIENT:  LD VALENCIA  [id number]    CHIEF COMPLAINT:  Patient is a 58y old  Male who presents with a chief complaint of NSTEMI (2023 20:29)      INTERVAL HISTORY/OVERNIGHT EVENTS:    MEDICATIONS:  MEDICATIONS  (STANDING):  aspirin  chewable 81 milliGRAM(s) Oral daily  atorvastatin 80 milliGRAM(s) Oral at bedtime  chlorhexidine 0.12% Liquid 15 milliLiter(s) Oral Mucosa every 12 hours  dexMEDEtomidine Infusion 0.5 MICROgram(s)/kG/Hr (11.2 mL/Hr) IV Continuous <Continuous>  dextrose 5%. 1000 milliLiter(s) (100 mL/Hr) IV Continuous <Continuous>  dextrose 5%. 1000 milliLiter(s) (50 mL/Hr) IV Continuous <Continuous>  dextrose 50% Injectable 25 Gram(s) IV Push once  dextrose 50% Injectable 25 Gram(s) IV Push once  dextrose 50% Injectable 12.5 Gram(s) IV Push once  glucagon  Injectable 1 milliGRAM(s) IntraMuscular once  heparin  Infusion 1000 Unit(s)/Hr (12 mL/Hr) IV Continuous <Continuous>  insulin glargine Injectable (LANTUS) 13 Unit(s) SubCutaneous at bedtime  insulin lispro (ADMELOG) corrective regimen sliding scale   SubCutaneous every 6 hours  levoFLOXacin  Tablet 500 milliGRAM(s) Oral every 24 hours  norepinephrine Infusion 0.05 MICROgram(s)/kG/Min (9.27 mL/Hr) IV Continuous <Continuous>  pantoprazole  Injectable 40 milliGRAM(s) IV Push every 12 hours  propofol Infusion 40 MICROgram(s)/kG/Min (21.5 mL/Hr) IV Continuous <Continuous>  ticagrelor 90 milliGRAM(s) Oral every 12 hours    MEDICATIONS  (PRN):  dextrose Oral Gel 15 Gram(s) Oral once PRN Blood Glucose LESS THAN 70 milliGRAM(s)/deciliter      ALLERGIES:  Allergies    penicillins (Unknown)    Intolerances        OBJECTIVE:  ICU Vital Signs Last 24 Hrs  T(C): 36.6 (2023 04:00), Max: 36.8 (2023 10:00)  T(F): 97.9 (2023 04:00), Max: 98.2 (2023 10:00)  HR: 74 (2023 07:00) (69 - 88)  BP: 127/82 (2023 12:26) (115/95 - 127/82)  BP(mean): 100 (2023 12:26) (100 - 101)  ABP: 109/76 (2023 07:00) (78/55 - 130/96)  ABP(mean): 96 (2023 07:00) (66 - 113)  RR: 16 (2023 07:00) (7 - 41)  SpO2: 97% (2023 07:00) (94% - 100%)    O2 Parameters below as of 2023 04:00  Patient On (Oxygen Delivery Method): ventilator  O2 Flow (L/min): 30        Mode: AC/ CMV (Assist Control/ Continuous Mandatory Ventilation)  RR (machine): 16  TV (machine): 500  FiO2: 50  PEEP: 5  ITime: 1  MAP: 9  PIP: 19    Adult Advanced Hemodynamics Last 24 Hrs  CVP(mm Hg): 15 (2023 07:00) (-3 - 26)  CVP(cm H2O): --  CO: --  CI: --  PA: 40/23 (2023 07:00) (22/10 - 49/32)  PA(mean): 30 (2023 07:00) (14 - 38)  PCWP: --  SVR: --  SVRI: --  PVR: --  PVRI: --  CAPILLARY BLOOD GLUCOSE      POCT Blood Glucose.: 104 mg/dL (2023 05:27)    CAPILLARY BLOOD GLUCOSE      POCT Blood Glucose.: 104 mg/dL (2023 05:27)    I&O's Summary    2023 07:01  -  2023 07:00  --------------------------------------------------------  IN: 481.6 mL / OUT: 2920 mL / NET: -2438.4 mL      Daily Height in cm: 175.26 (2023 12:26)    Daily     PHYSICAL EXAMINATION:  General: WN/WD NAD  HEENT: PERRLA, EOMI, moist mucous membranes  Neurology: A&Ox3, nonfocal, MOISE x 4  Respiratory: CTA B/L, normal respiratory effort, no wheezes, crackles, rales  CV: RRR, S1S2, no murmurs, rubs or gallops  Abdominal: Soft, NT, ND +BS, Last BM  Extremities: No edema, + peripheral pulses  Incisions:   Tubes:    LABS:  ABG - ( 2023 02:45 )  pH, Arterial: 7.40  pH, Blood: x     /  pCO2: 37    /  pO2: 121   / HCO3: 23    / Base Excess: -1.5  /  SaO2: 99.3                                    13.8   7.56  )-----------( 134      ( 2023 05:34 )             41.3     11-02    139  |  107  |  21  ----------------------------<  110<H>  4.0   |  18<L>  |  1.09    Ca    8.0<L>      2023 02:48  Phos  3.4     11-02  Mg     2.0     11-02    TPro  5.9<L>  /  Alb  3.3  /  TBili  0.4  /  DBili  x   /  AST  139<H>  /  ALT  65<H>  /  AlkPhos  42  11-02    LIVER FUNCTIONS - ( 2023 02:48 )  Alb: 3.3 g/dL / Pro: 5.9 g/dL / ALK PHOS: 42 U/L / ALT: 65 U/L / AST: 139 U/L / GGT: x           PT/INR - ( 2023 22:22 )   PT: 12.9 sec;   INR: 1.18 ratio         PTT - ( 2023 05:34 )  PTT:25.9 sec        Urinalysis Basic - ( 2023 03:58 )    Color: Yellow / Appearance: Turbid / S.030 / pH: x  Gluc: x / Ketone: 15 mg/dL  / Bili: Negative / Urobili: 1.0 mg/dL   Blood: x / Protein: 30 mg/dL / Nitrite: Negative   Leuk Esterase: Moderate / RBC: 91 /HPF /  /HPF   Sq Epi: x / Non Sq Epi: 1 /HPF / Bacteria: Occasional /HPF        TELEMETRY:     EKG:     IMAGING:           PATIENT:  LD VALENCIA  [id number]    CHIEF COMPLAINT:  Patient is a 58y old  Male who presents with a chief complaint of NSTEMI (2023 20:29)      INTERVAL HISTORY/OVERNIGHT EVENTS: Overnight, precedex was added and pt became hypotensive requiring levo.    Patient seen and examined at bedside. Sedated, does not respond to voice or pain. Appears comfortable.    MEDICATIONS:  MEDICATIONS  (STANDING):  aspirin  chewable 81 milliGRAM(s) Oral daily  atorvastatin 80 milliGRAM(s) Oral at bedtime  chlorhexidine 0.12% Liquid 15 milliLiter(s) Oral Mucosa every 12 hours  dexMEDEtomidine Infusion 0.5 MICROgram(s)/kG/Hr (11.2 mL/Hr) IV Continuous <Continuous>  dextrose 5%. 1000 milliLiter(s) (100 mL/Hr) IV Continuous <Continuous>  dextrose 5%. 1000 milliLiter(s) (50 mL/Hr) IV Continuous <Continuous>  dextrose 50% Injectable 25 Gram(s) IV Push once  dextrose 50% Injectable 25 Gram(s) IV Push once  dextrose 50% Injectable 12.5 Gram(s) IV Push once  glucagon  Injectable 1 milliGRAM(s) IntraMuscular once  heparin  Infusion 1000 Unit(s)/Hr (12 mL/Hr) IV Continuous <Continuous>  insulin glargine Injectable (LANTUS) 13 Unit(s) SubCutaneous at bedtime  insulin lispro (ADMELOG) corrective regimen sliding scale   SubCutaneous every 6 hours  levoFLOXacin  Tablet 500 milliGRAM(s) Oral every 24 hours  norepinephrine Infusion 0.05 MICROgram(s)/kG/Min (9.27 mL/Hr) IV Continuous <Continuous>  pantoprazole  Injectable 40 milliGRAM(s) IV Push every 12 hours  propofol Infusion 40 MICROgram(s)/kG/Min (21.5 mL/Hr) IV Continuous <Continuous>  ticagrelor 90 milliGRAM(s) Oral every 12 hours    MEDICATIONS  (PRN):  dextrose Oral Gel 15 Gram(s) Oral once PRN Blood Glucose LESS THAN 70 milliGRAM(s)/deciliter      ALLERGIES:  Allergies    penicillins (Unknown)    Intolerances        OBJECTIVE:  ICU Vital Signs Last 24 Hrs  T(C): 36.6 (2023 04:00), Max: 36.8 (2023 10:00)  T(F): 97.9 (2023 04:00), Max: 98.2 (2023 10:00)  HR: 74 (2023 07:00) (69 - 88)  BP: 127/82 (2023 12:26) (115/95 - 127/82)  BP(mean): 100 (2023 12:26) (100 - 101)  ABP: 109/76 (2023 07:00) (78/55 - 130/96)  ABP(mean): 96 (2023 07:00) (66 - 113)  RR: 16 (2023 07:00) (7 - 41)  SpO2: 97% (2023 07:00) (94% - 100%)    O2 Parameters below as of 2023 04:00  Patient On (Oxygen Delivery Method): ventilator  O2 Flow (L/min): 30        Mode: AC/ CMV (Assist Control/ Continuous Mandatory Ventilation)  RR (machine): 16  TV (machine): 500  FiO2: 50  PEEP: 5  ITime: 1  MAP: 9  PIP: 19    Adult Advanced Hemodynamics Last 24 Hrs  CVP(mm Hg): 15 (2023 07:00) (-3 - 26)  CVP(cm H2O): --  CO: --  CI: --  PA: 40/23 (2023 07:00) (22/10 - 49/32)  PA(mean): 30 (2023 07:00) (14 - 38)  PCWP: --  SVR: --  SVRI: --  PVR: --  PVRI: --  CAPILLARY BLOOD GLUCOSE      POCT Blood Glucose.: 104 mg/dL (2023 05:27)    CAPILLARY BLOOD GLUCOSE      POCT Blood Glucose.: 104 mg/dL (2023 05:27)    I&O's Summary    2023 07:01  -  2023 07:00  --------------------------------------------------------  IN: 481.6 mL / OUT: 2920 mL / NET: -2438.4 mL      Daily Height in cm: 175.26 (2023 12:26)    Daily     PHYSICAL EXAMINATION:  General: WN/WD NAD  HEENT: Pinpoint pupils bilaterally. PERRLA, moist mucous membranes  Neurology: A&Ox0, sedated, does not withdraw to pain  Respiratory: CTA B/L, normal respiratory effort, no wheezes, crackles, rales  CV: RRR, S1S2, no murmurs, rubs or gallops  Abdominal: Soft, NT, ND  Extremities: No edema    LABS:  ABG - ( 2023 02:45 )  pH, Arterial: 7.40  pH, Blood: x     /  pCO2: 37    /  pO2: 121   / HCO3: 23    / Base Excess: -1.5  /  SaO2: 99.3                                    13.8   7.56  )-----------( 134      ( 2023 05:34 )             41.3     11    139  |  107  |  21  ----------------------------<  110<H>  4.0   |  18<L>  |  1.09    Ca    8.0<L>      2023 02:48  Phos  3.4     11-  Mg     2.0     11-    TPro  5.9<L>  /  Alb  3.3  /  TBili  0.4  /  DBili  x   /  AST  139<H>  /  ALT  65<H>  /  AlkPhos  42  11-02    LIVER FUNCTIONS - ( 2023 02:48 )  Alb: 3.3 g/dL / Pro: 5.9 g/dL / ALK PHOS: 42 U/L / ALT: 65 U/L / AST: 139 U/L / GGT: x           PT/INR - ( 2023 22:22 )   PT: 12.9 sec;   INR: 1.18 ratio         PTT - ( 2023 05:34 )  PTT:25.9 sec        Urinalysis Basic - ( 2023 03:58 )    Color: Yellow / Appearance: Turbid / S.030 / pH: x  Gluc: x / Ketone: 15 mg/dL  / Bili: Negative / Urobili: 1.0 mg/dL   Blood: x / Protein: 30 mg/dL / Nitrite: Negative   Leuk Esterase: Moderate / RBC: 91 /HPF /  /HPF   Sq Epi: x / Non Sq Epi: 1 /HPF / Bacteria: Occasional /HPF        TELEMETRY:     EKG:     IMAGING:

## 2023-11-02 NOTE — CONSULT NOTE ADULT - ASSESSMENT
Cardiac Studies  11/1/23  C showed pLAD 70 % stenosis in the ostium portion of the segment and 100 % in-stent restenosis and in mRCA, 100 % stenosis.   11/1/23 RHC; RA 23mmHg with a V wave of 24mmHg), PA 52/33/40, PCWP 30 Vw 33,  AO = 85/66/73. IABP was placed during the cath through R common femoral artery.  57 yo M with HTN, CAD (s/p PCI 2008), HFrEF (EF severely reduced 2018) not on GDMT, CVA 2018 (requiring tpA), DM presenting with chest pressure and unknown tachycardia that was shocked x1. He went into acute respiratory distress requiring intubation prior to arriving to lab. C 11/1 found to have chronic total occlusion of LAD and RCA, with elevated RA and PA pressures and in low perfusion indices TTE 11/1 w/ severely decreased EF 32%, s/p IABP 11/1, likely in cardiogenic shock 2/2 acute on chronic HF s/p unknown shockable rhythm.     He is currently in the CICU, remains intubated and briefly required low dose pressor support when his sedation was transitioned. He is currently on IABP 1:1 with PA pressure that are not very elevated. His labs notable for normal renal chemistries and LFT's that are improving.       Cardiac Studies  11/1/23  [facility] showed pLAD 70 % stenosis in the ostium portion of the segment and 100 % in-stent restenosis and in mRCA, 100 % stenosis.   11/1/23 RHC; RA 23 Vw 24, PA 52/33/40, PCWP 30 Vw 33, AO 85/66/73, PA sat 54.4%, CO/CI (F) 2.96/1.44, IABP was placed during the cath through R common femoral artery.   11/1/23 TTE: LVIDd 6.4cm , LVEF 32%, regional WMA, grade II DD,  TAPSE 1.7cm, mld MR, trace TR,

## 2023-11-02 NOTE — CONSULT NOTE ADULT - ASSESSMENT
59 yo M with PMHx of HTN, CAD w/ 1 stent in 2009, ICH (2008) presented on 11/1 with abn ekg. Patient presented to [facility] where he was found to have STEMI, recommended to get cath however patient did not want to get it there so he left and came here.     EKG here with ST depression in lateral leads and elevation in anterior leads   Prior to [facility] found to be tachycardic, dyspneic, intubated   [facility] 11/1 with chronic total occlusion of LAD and RCA, with elevated RA and PA pressures  TTE 11/1 with severely decreased EF 32%, s/p IABP 11/1    Afebrile   No leukocytosis     Reportedly had an outpatient course of Levofloxacin for pneumonia   Tested + Covid   CXR: clear lungs     Antimicrobials:  Cefepime   Vancomycin     #Acute hypoxic respiratory failure on ventilator likely 2/2 to cardiogenic shock in the setting of STEMI       Recommendations:             PT TO BE SEEN. PRELIM NOTE  PENDING RECS. PLEASE WAIT FOR FINAL RECS AFTER DISCUSSION WITH ATTENDING#             59 yo M with PMHx of HTN, CAD w/ 1 stent in 2009, ICH (2008) presented on 11/1 with abn ekg. Patient presented to [facility] where he was found to have STEMI, recommended to get cath however patient did not want to get it there so he left and came here.     EKG here with ST depression in lateral leads and elevation in anterior leads   Prior to [facility] found to be tachycardic, dyspneic, intubated   [facility] 11/1 with chronic total occlusion of LAD and RCA, with elevated RA and PA pressures  TTE 11/1 with severely decreased EF 32%, s/p IABP 11/1    Afebrile   No leukocytosis     Reportedly had an outpatient course of Levofloxacin for pneumonia   Tested + Covid   CXR: clear lungs     Antimicrobials:  Cefepime   Vancomycin     #Acute hypoxic respiratory failure on ventilator likely 2/2 to cardiogenic shock in the setting of STEMI, currently on low Fi02 and no secretions, low concern of pneumonia on CXR   #Covid infection     Recommendations:   -Would discontinue Vancomycin and Cefepime   -POCUS of the lungs   -If high suspicion for infection, can re-start antibiotics   -F/up cultures    -Plan discussed with primary team     Seen and discussed with Dr Nury Mccullough MD, PGY5  ID fellow  [employer] Teams Preferred  After 5pm/weekends call [phone]

## 2023-11-02 NOTE — PROGRESS NOTE ADULT - ASSESSMENT
====================== NEUROLOGY=====================  Sedated on propofol  - c/w propofol 50, wean as tolerated  - consider switching to precedex in AM to SBT   - continue to monitor mental status as per protocol     ==================== RESPIRATORY======================  Intubated for airway protection prior to [facility]  - vent settings: 500/16/5/50%  - wean FiO2 as tolerated   - trend ABGs  - continue to monitor SpO2 with goal >94%    Mechanical Vent: Mode: AC/ CMV (Assist Control/ Continuous Mandatory Ventilation)  RR (machine): 16  TV (machine): 500  FiO2: 100  PEEP: 5  ITime: 1  MAP: 12  PIP: 30    ====================CARDIOVASCULAR==================    #Cardiogenic Shock requiring IABP (11/1-) s/p unknown shockable event due to ischemia?  - 11/1 C: pLAD 100 % in-stent restenosis & mRCA, 100 %. PCWP 30. +IABP.  - 11/1 TTE: LV dilated. EF 32 %. Regional WMAs present, mod (grade 2) LV diastolic dysfunction  -per echo areas of akinesis are not new compared to 2017, but EF is severely decreased   -currently on IABP 1:1, with augmentation of  -daily CXR for IABP  -Keep right leg straight while sheaths/IABP/[place] are in position   -consider starting inotropic support    #STEMI at OSH  -Trop 440-> 414  -EKG here w/ LBBB  -[facility] 11/1: pLAD 70 % stenosis in the ostium portion of the segment and 100 % in-stent restenosis. mRCA, 100 % stenosis. RA 23, PA 51/33, wedge 31, PA sat 54%, CI 1.5  -s/p IABP  -c/w ASA, brillinta (takes plavix at home?)  -c/w lipitor 80  -c/w heparin gtt  -TTE 11/1: severely reduced EF, no new areas of akinesis  -f/u regarding intervention when stable    #HFrEF w/ severely reduced EF 32%  TTE 11/1: LV moderately dilated. EF 32 %. Regional wall motion abnormalities present, mod (grade 2) LV diastolic dysfunction, with indeterminant filling pressure.   -s/p lasix 40  -continue management of shock as above  -start GDMT when stable  - Pt appears clinically hypervolemic, likely cause of SoB  - BNP 4168 on admission  - CXR 11/1 clear   - tele  - Start Lasix 40mg qd  - Strict I&O  - Daily weights  - Fluid restriction  - on ozempic and dapaglifozin at home    #HTN  -hold home losartan 25, HCTZ 25, coreg 25 bid for now    PULM:  #PNA  Pt initially had cough, congestion, fever, was placed on antibiotics on Juan 10/29  -CXR here clear  -on levaquin (10/29-  -if afebrile, remains w/o leukocytosis, consider finishing 5 day course  -monitor QTc on levaquin    #Asthma (hx of respiratory failure in 2018- intubated for 20 minutes per chart review pt report)  -takes montelukast, trelegy ellipa, albuterol at home    RENAL:  Baseline Cr: 1-1.22  -trend Cr    #Metabolic acidosis  Likely 2/2 lactate of 3.7, due to cardiogenic shock  -trend lactate, blood gas  -IABP, consider adding inotropic support    GI:  #Transaminitis  Likely iso cardiogenic shock/abnormal shockable event  AST/ALT: 94/50 on admission  -trend LFTs    #Diet: NPO    #Bowel regimen:     ENDO:  #Type 2 DM  A1c 8.3  -TSH    METABOLIC:  #Electrolyte abnormalities    HEMATOLOGIC:  H/H stable    #DVT prophylaxis with heparin    ID:  #PNA  -plan as above  -c/w levaquin for 5 day course if afebrile/no leuks    SKIN:  #Lines: IABP (11/1-.     59 yo M with HTN, CAD (s/p PCI 2008), HFrEF (EF severely reduced 2018) not on GDMT, CVA 2018 (requiring tpA), DM presenting with chest pressure and unknown tachycardia that was shocked x1, [facility] 11/1 found to have chronic total occlusion of LAD and RCA, with elevated RA and PA pressures, echo 11/1 w/ severely decreased EF 32%, s/p IABP 11/1, likely in cardiogenic shock 2/2 acute on chronic HF s/p unknown shockable rhythm.    NEUROLOGY  Sedated on propofol  - wean propofol as tolerated for SBT  - continue to monitor mental status as per protocol     RESPIRATORY  Intubated for AHRF prior to [facility]  - vent settings: 500/16/5/50%  - wean FiO2 as tolerated, presently requiring the vent  - trend ABGs  - continue to monitor SpO2 with goal >94%  - COVID +  -empirically cover with vanc and cefepime (11/2-  -MRSA swab    #R sided opacification on CXR 11/2  -Possibly iso aspiration event prior to intubation  -start empiric coverage with vanc and cefepime (11/2-  -MRSA swab  -sputum cx    #History of PNA  Pt initially had cough, congestion, fever, was placed on antibiotics on Juan 10/29  -CXR here clear  -s/p levaquin (10/29-10/31)  -if afebrile, remains w/o leukocytosis       #Asthma (hx of respiratory failure in 2018- intubated for 20 minutes per chart review pt report)  -restart montelukast and albuterol qd  -resume home trelegy when extubated    ====================CARDIOVASCULAR==================    #Cardiogenic Shock requiring IABP (11/1-) s/p unknown shockable event due to ischemia?  - 11/1 [facility]: pLAD 100 % in-stent restenosis & mRCA, 100 %. PCWP 30. +IABP.  - 11/1 TTE: LV dilated. EF 32 %. Regional WMAs present, mod (grade 2) LV diastolic dysfunction  -per echo areas of akinesis are not new compared to 2017, but EF is severely decreased   -currently on IABP 1:1, with augmentation of 130s, Mean 120s  -wean off levo  -daily CXR for IABP  -Keep right leg straight while sheaths/IABP/[place] are in position       #STEMI at OSH  -Trop 440-> 414  -EKG here w/ LBBB  -LHC 11/1: pLAD 70 % stenosis in the ostium portion of the segment and 100 % in-stent restenosis. mRCA, 100 % stenosis. RA 23, PA 51/33, wedge 31, PA sat 54%, CI 1.5  -s/p IABP  -c/w ASA, brillinta (takes plavix at home?)  -c/w lipitor 80  -c/w heparin gtt  -TTE 11/1: severely reduced EF, no new areas of akinesis  -CT surg not recommending CABG, instead AT eval  -consider viability study    #HFrEF w/ severely reduced EF 32%  TTE 11/1: LV moderately dilated. EF 32 %. Regional wall motion abnormalities present, mod (grade 2) LV diastolic dysfunction, with indeterminant filling pressure.   TTE 11/2: LV thrombus, EF of 22% w/ global hypokinesis  -s/p lasix 40  -continue management of shock as above  -start GDMT when stable  - Pt appears clinically hypervolemic, likely cause of SoB  - BNP 4168 on admission  - CXR 11/1 clear   - tele  - S/p lasix 40 on admission  -presently euvolemic w/ CVP 6  - Strict I&O  - Daily weights  - Fluid restriction  - on ozempic and dapaglifozin at home    #LV thrombus  TTE 11/2: LV thrombus, EF of 22% w/ global hypokinesis  -c/w heparin gtt    #HTN  -hold home losartan 25, HCTZ 25, coreg 25 bid for now    RENAL:  Baseline Cr: 1-1.22  -trend Cr    #Metabolic acidosis  Likely 2/2 lactate of 3.7, due to cardiogenic shock  Improved  -trend lactate, blood gas  -IABP     GI:  #Transaminitis  Likely iso cardiogenic shock/abnormal shockable event  AST/ALT: 94/50 on admission  -trend LFTs    #Diet: NPO    #Bowel regimen:     ENDO:  #Type 2 DM  A1c 8.3  -TSH    METABOLIC:  -no active issues    HEMATOLOGIC:  H/H stable    #DVT prophylaxis with heparin    ID:  #Concern for aspiration event prior to intubation  R sided opacification on CXR 11/2  -Possibly iso aspiration event prior to intubation  -start empiric coverage with vanc and cefepime (11/2-  -MRSA swab  -sputum cx    #History of PNA  Pt initially had cough, congestion, fever, was placed on antibiotics on Juan 10/29  -CXR 11/1 clear  -s/p levaquin (10/29-10/31)    SKIN:  #Lines: IABP (11/1-, [place] (11/1-

## 2023-11-02 NOTE — CHART NOTE - NSCHARTNOTEFT_GEN_A_CORE
PDI	First Name	Last Name	Birth Date	Gender	Street Address	[facility]	Zip Code  A	Ravi Hardy	1965	Male	[address]	[place]	[address]  B	Ravi Hardy	1965	Male	[id number] [facility]	[place]	[address]    PDI	Current Rx	Drug Type	Rx Written	Rx Dispensed	Drug	Quantity	Days Supply	Prescriber Name	Prescriber KATIUSKA #	Payment Method	Dispenser  A	N	B	08/09/2023	08/09/2023	lorazepam 0.5 mg tablet	30	30	Brody Banuelos	[id number]	Insurance	[facility]  [place]  A	N	B	01/03/2023	02/01/2023	lorazepam 0.5 mg tablet	90	30	Brody Banuelos	[id number]	Insurance	[facility]  [place]  B	N	B	01/03/2023	01/03/2023	lorazepam 0.5 mg tablet	90	30	Brody Banuelos	[id number]	Insurance	[facility] #[id number]

## 2023-11-02 NOTE — PROGRESS NOTE ADULT - SUBJECTIVE AND OBJECTIVE BOX
LD VALENCIA  58y Male  MRN:[id number]    Patient is a 58y old  Male who presents with a chief complaint of NSTEMI (2023 20:29)    HPI:  59yo M w/ hx HTN, CAD w/ 1 stent in , ICH () presenting with abn ekg. Patient presented to [facility] where he was found to have STEMI, recommended to get cath however patient did not want to get it there so it left and came here.  Patient initially had cough, congestion, fever, was placed on antibiotics on .  Started feeling nauseous and had a presyncopal event after which he presented to ED last night.  Had chest pain as well.  Chest pain is midsternal.  Not currently having chest pain.  Received 4 aspirin 30 min pta. (2023 15:11)      Patient seen and evaluated at bedside in CCU. interval events noted    Interval HPI: pt intubated yesterday 2/2 resp failure prior to cath. cath c/w multi vessel dz.   now in CCU    PAST MEDICAL & SURGICAL HISTORY:  HTN (hypertension)      CAD (coronary artery disease)  ; stent      Intracranial hemorrhage        Respiratory arrest        Myocardial infarction, unspecified MI type, unspecified artery      History of coronary artery stent placement          REVIEW OF SYSTEMS:  as per hpi     VITALS:  Vital Signs Last 24 Hrs  T(C): 36.1 (2023 11:00), Max: 37.2 (2023 08:00)  T(F): 97 (2023 11:00), Max: 99 (2023 08:00)  HR: 73 (2023 11:00) (69 - 88)  BP: 127/82 (2023 12:26) (127/82 - 127/82)  BP(mean): 100 (2023 12:26) (100 - 100)  RR: 16 (2023 11:00) (7 - 41)  SpO2: 95% (2023 11:00) (94% - 100%)    Parameters below as of 2023 11:00  Patient On (Oxygen Delivery Method): ventilator    O2 Concentration (%): 40  CAPILLARY BLOOD GLUCOSE      POCT Blood Glucose.: 104 mg/dL (2023 05:27)    I&O's Summary    2023 07:01  -  2023 07:00  --------------------------------------------------------  IN: 481.6 mL / OUT: 2920 mL / NET: -2438.4 mL    2023 07:01  -  2023 11:47  --------------------------------------------------------  IN: 163 mL / OUT: 300 mL / NET: -137 mL        PHYSICAL EXAM:  intubated/sedated     Consultant(s) Notes Reviewed:  [x ] YES  [ ] NO  Care Discussed with Consultants/Other Providers [ x] YES  [ ] NO    MEDS:  MEDICATIONS  (STANDING):  albuterol    0.083% 2.5 milliGRAM(s) Nebulizer daily  aspirin  chewable 81 milliGRAM(s) Oral daily  atorvastatin 80 milliGRAM(s) Oral at bedtime  cefepime   IVPB      cefepime   IVPB 1000 milliGRAM(s) IV Intermittent once  cefepime   IVPB 1000 milliGRAM(s) IV Intermittent every 8 hours  chlorhexidine 0.12% Liquid 15 milliLiter(s) Oral Mucosa every 12 hours  chlorhexidine 2% Cloths 1 Application(s) Topical daily  dexMEDEtomidine Infusion 0.5 MICROgram(s)/kG/Hr (11.2 mL/Hr) IV Continuous <Continuous>  dextrose 50% Injectable 25 Gram(s) IV Push once  dextrose 50% Injectable 12.5 Gram(s) IV Push once  dextrose 50% Injectable 25 Gram(s) IV Push once  glucagon  Injectable 1 milliGRAM(s) IntraMuscular once  heparin  Infusion 1000 Unit(s)/Hr (12 mL/Hr) IV Continuous <Continuous>  insulin glargine Injectable (LANTUS) 13 Unit(s) SubCutaneous at bedtime  insulin lispro (ADMELOG) corrective regimen sliding scale   SubCutaneous every 6 hours  norepinephrine Infusion 0.05 MICROgram(s)/kG/Min (9.27 mL/Hr) IV Continuous <Continuous>  pantoprazole  Injectable 40 milliGRAM(s) IV Push every 12 hours  propofol Infusion 40 MICROgram(s)/kG/Min (21.5 mL/Hr) IV Continuous <Continuous>  ticagrelor 90 milliGRAM(s) Oral every 12 hours  vancomycin  IVPB 1500 milliGRAM(s) IV Intermittent every 12 hours    MEDICATIONS  (PRN):  dextrose Oral Gel 15 Gram(s) Oral once PRN Blood Glucose LESS THAN 70 milliGRAM(s)/deciliter    ALLERGIES:  penicillins (Unknown)      LABS:                        13.8   7.56  )-----------( 134      ( 2023 05:34 )             41.3     11-    139  |  107  |  21  ----------------------------<  110<H>  4.0   |  18<L>  |  1.09    Ca    8.0<L>      2023 02:48  Phos  3.4     11-  Mg     2.0     11    TPro  5.9<L>  /  Alb  3.3  /  TBili  0.4  /  DBili  x   /  AST  139<H>  /  ALT  65<H>  /  AlkPhos  42  11-02    PT/INR - ( 2023 22:22 )   PT: 12.9 sec;   INR: 1.18 ratio         PTT - ( 2023 05:34 )  PTT:25.9 sec      LIVER FUNCTIONS - ( 2023 02:48 )  Alb: 3.3 g/dL / Pro: 5.9 g/dL / ALK PHOS: 42 U/L / ALT: 65 U/L / AST: 139 U/L / GGT: x           Urinalysis Basic - ( 2023 03:58 )    Color: Yellow / Appearance: Turbid / S.030 / pH: x  Gluc: x / Ketone: 15 mg/dL  / Bili: Negative / Urobili: 1.0 mg/dL   Blood: x / Protein: 30 mg/dL / Nitrite: Negative   Leuk Esterase: Moderate / RBC: 91 /HPF /  /HPF   Sq Epi: x / Non Sq Epi: 1 /HPF / Bacteria: Occasional /HPF     < from: Xray Chest 1 View- PORTABLE-Urgent (23 @ 07:42) >    IMPRESSION:  Clear lungs.    ---End of Report ---        < end of copied text >  < from: TTE W or WO Ultrasound Enhancing Agent (23 @ 10:23) >  _____________________________     CONCLUSIONS:      1. Left ventricular cavity is moderately dilated. Left ventricular wall thickness is normal. Left ventricular systolic function is severely decreased with an ejection fraction of 32 % by Chinchilla's method of disks. Regional wall motion abnormalities present.   2. Multiple segmental abnormalities exist. See findings.   3. There is moderate (grade 2) left ventricular diastolic dysfunction, with indeterminant filling pressure.   4. Normal right ventricular cavity size, wall thickness, and systolic function.   5. No significant valvular disease.   6. No pericardial effusion seen.   7. Compared to the transthoracic echocardiogram performed on 2017 the areas of akinesis are unchanged but there has been a decline in LV systolic function with new areas of hypokinesis.    __________________________________________________________________    < end of copied text >  < from: TTE Limited W or WO Ultrasound Enhancing Agent (23 @ 07:41) >  __________________________     CONCLUSIONS:      1. After obtaining consent, Definity ultrasound enhancing agent was given for enhanced left ventricular opacification and improved delineation of the left ventricular endocardial borders. Left ventricular systolic function is severely decreased with a calculated ejection fraction of 22 % by the Chinchilla's biplane method of disks. There is a left ventricular thrombus.   2. Findings were discussed with Litzy BOSS on 2023 at 8.49am.   3. There is a left ventricular thrombus.    _________________________________________________________________    < end of copied text >

## 2023-11-02 NOTE — CONSULT NOTE ADULT - ATTENDING COMMENTS
Respiratory failure, suspected cardiac basis.  Per discussion with critical care team indication for antibiotics was foaming at the mouth and concern of aspiration event around that time.  No clear convincing evidence of aspiration at this juncture.  Chest exam is fairly clear, imaging without clear convincing evidence of pneumonia, FiO2 requirement minimal.  If there is concern about potential pneumonia despite benign appearing chest x-ray, would check POCUS.  However unless less stronger support emerges for antibiotic use, I would discontinue them.    With respect to COVID, the respiratory failure is unrelated to this diagnosis.  I do not think he merits remdesivir or steroids at this juncture.  Reviewed with CCU team  Thank you for the courtesy of this referral  Enrique Arrington MD  Attending Physician  [facility]  Division of Infectious Diseases  [phone]

## 2023-11-03 LAB
ALBUMIN SERPL ELPH-MCNC: 2.9 G/DL — LOW (ref 3.3–5)
ALBUMIN SERPL ELPH-MCNC: 2.9 G/DL — LOW (ref 3.3–5)
ALP SERPL-CCNC: 42 U/L — SIGNIFICANT CHANGE UP (ref 40–120)
ALP SERPL-CCNC: 42 U/L — SIGNIFICANT CHANGE UP (ref 40–120)
ALT FLD-CCNC: 53 U/L — HIGH (ref 10–45)
ALT FLD-CCNC: 53 U/L — HIGH (ref 10–45)
ANION GAP SERPL CALC-SCNC: 10 MMOL/L — SIGNIFICANT CHANGE UP (ref 5–17)
ANION GAP SERPL CALC-SCNC: 10 MMOL/L — SIGNIFICANT CHANGE UP (ref 5–17)
APTT BLD: 105.9 SEC — HIGH (ref 24.5–35.6)
APTT BLD: 105.9 SEC — HIGH (ref 24.5–35.6)
APTT BLD: 77 SEC — HIGH (ref 24.5–35.6)
APTT BLD: 77 SEC — HIGH (ref 24.5–35.6)
APTT BLD: 84.8 SEC — HIGH (ref 24.5–35.6)
APTT BLD: 84.8 SEC — HIGH (ref 24.5–35.6)
APTT BLD: 89.7 SEC — HIGH (ref 24.5–35.6)
APTT BLD: 89.7 SEC — HIGH (ref 24.5–35.6)
AST SERPL-CCNC: 70 U/L — HIGH (ref 10–40)
AST SERPL-CCNC: 70 U/L — HIGH (ref 10–40)
BASE EXCESS BLDMV CALC-SCNC: -0.8 MMOL/L — SIGNIFICANT CHANGE UP (ref -3–3)
BASE EXCESS BLDMV CALC-SCNC: -0.8 MMOL/L — SIGNIFICANT CHANGE UP (ref -3–3)
BASE EXCESS BLDMV CALC-SCNC: 0.1 MMOL/L — SIGNIFICANT CHANGE UP (ref -3–3)
BASE EXCESS BLDMV CALC-SCNC: 0.1 MMOL/L — SIGNIFICANT CHANGE UP (ref -3–3)
BASE EXCESS BLDMV CALC-SCNC: 0.5 MMOL/L — SIGNIFICANT CHANGE UP (ref -3–3)
BASE EXCESS BLDMV CALC-SCNC: 0.5 MMOL/L — SIGNIFICANT CHANGE UP (ref -3–3)
BASOPHILS # BLD AUTO: 0.02 K/UL — SIGNIFICANT CHANGE UP (ref 0–0.2)
BASOPHILS # BLD AUTO: 0.02 K/UL — SIGNIFICANT CHANGE UP (ref 0–0.2)
BASOPHILS NFR BLD AUTO: 0.3 % — SIGNIFICANT CHANGE UP (ref 0–2)
BASOPHILS NFR BLD AUTO: 0.3 % — SIGNIFICANT CHANGE UP (ref 0–2)
BILIRUB SERPL-MCNC: 0.4 MG/DL — SIGNIFICANT CHANGE UP (ref 0.2–1.2)
BILIRUB SERPL-MCNC: 0.4 MG/DL — SIGNIFICANT CHANGE UP (ref 0.2–1.2)
BUN SERPL-MCNC: 14 MG/DL — SIGNIFICANT CHANGE UP (ref 7–23)
BUN SERPL-MCNC: 14 MG/DL — SIGNIFICANT CHANGE UP (ref 7–23)
CALCIUM SERPL-MCNC: 8.5 MG/DL — SIGNIFICANT CHANGE UP (ref 8.4–10.5)
CALCIUM SERPL-MCNC: 8.5 MG/DL — SIGNIFICANT CHANGE UP (ref 8.4–10.5)
CHLORIDE SERPL-SCNC: 109 MMOL/L — HIGH (ref 96–108)
CHLORIDE SERPL-SCNC: 109 MMOL/L — HIGH (ref 96–108)
CHOLEST SERPL-MCNC: 198 MG/DL — SIGNIFICANT CHANGE UP
CHOLEST SERPL-MCNC: 198 MG/DL — SIGNIFICANT CHANGE UP
CO2 BLDMV-SCNC: 25 MMOL/L — SIGNIFICANT CHANGE UP (ref 21–29)
CO2 BLDMV-SCNC: 26 MMOL/L — SIGNIFICANT CHANGE UP (ref 21–29)
CO2 BLDMV-SCNC: 26 MMOL/L — SIGNIFICANT CHANGE UP (ref 21–29)
CO2 SERPL-SCNC: 20 MMOL/L — LOW (ref 22–31)
CO2 SERPL-SCNC: 20 MMOL/L — LOW (ref 22–31)
CREAT SERPL-MCNC: 1.02 MG/DL — SIGNIFICANT CHANGE UP (ref 0.5–1.3)
CREAT SERPL-MCNC: 1.02 MG/DL — SIGNIFICANT CHANGE UP (ref 0.5–1.3)
EGFR: 85 ML/MIN/1.73M2 — SIGNIFICANT CHANGE UP
EGFR: 85 ML/MIN/1.73M2 — SIGNIFICANT CHANGE UP
EOSINOPHIL # BLD AUTO: 0.14 K/UL — SIGNIFICANT CHANGE UP (ref 0–0.5)
EOSINOPHIL # BLD AUTO: 0.14 K/UL — SIGNIFICANT CHANGE UP (ref 0–0.5)
EOSINOPHIL NFR BLD AUTO: 2.2 % — SIGNIFICANT CHANGE UP (ref 0–6)
EOSINOPHIL NFR BLD AUTO: 2.2 % — SIGNIFICANT CHANGE UP (ref 0–6)
GAS PNL BLDA: SIGNIFICANT CHANGE UP
GAS PNL BLDMV: SIGNIFICANT CHANGE UP
GLUCOSE BLDC GLUCOMTR-MCNC: 109 MG/DL — HIGH (ref 70–99)
GLUCOSE BLDC GLUCOMTR-MCNC: 109 MG/DL — HIGH (ref 70–99)
GLUCOSE BLDC GLUCOMTR-MCNC: 91 MG/DL — SIGNIFICANT CHANGE UP (ref 70–99)
GLUCOSE BLDC GLUCOMTR-MCNC: 91 MG/DL — SIGNIFICANT CHANGE UP (ref 70–99)
GLUCOSE SERPL-MCNC: 105 MG/DL — HIGH (ref 70–99)
GLUCOSE SERPL-MCNC: 105 MG/DL — HIGH (ref 70–99)
GRAM STN FLD: SIGNIFICANT CHANGE UP
GRAM STN FLD: SIGNIFICANT CHANGE UP
HCO3 BLDMV-SCNC: 24 MMOL/L — SIGNIFICANT CHANGE UP (ref 20–28)
HCO3 BLDMV-SCNC: 25 MMOL/L — SIGNIFICANT CHANGE UP (ref 20–28)
HCO3 BLDMV-SCNC: 25 MMOL/L — SIGNIFICANT CHANGE UP (ref 20–28)
HCT VFR BLD CALC: 39.4 % — SIGNIFICANT CHANGE UP (ref 39–50)
HCT VFR BLD CALC: 39.4 % — SIGNIFICANT CHANGE UP (ref 39–50)
HDLC SERPL-MCNC: 24 MG/DL — LOW
HDLC SERPL-MCNC: 24 MG/DL — LOW
HGB BLD-MCNC: 13.2 G/DL — SIGNIFICANT CHANGE UP (ref 13–17)
HGB BLD-MCNC: 13.2 G/DL — SIGNIFICANT CHANGE UP (ref 13–17)
HGB FLD-MCNC: 15 G/DL — SIGNIFICANT CHANGE UP (ref 12.6–17.4)
HGB FLD-MCNC: 15 G/DL — SIGNIFICANT CHANGE UP (ref 12.6–17.4)
HOROWITZ INDEX BLDMV+IHG-RTO: 30 — SIGNIFICANT CHANGE UP
HOROWITZ INDEX BLDMV+IHG-RTO: 30 — SIGNIFICANT CHANGE UP
HOROWITZ INDEX BLDMV+IHG-RTO: 32 — SIGNIFICANT CHANGE UP
HOROWITZ INDEX BLDMV+IHG-RTO: 32 — SIGNIFICANT CHANGE UP
HOROWITZ INDEX BLDMV+IHG-RTO: 40 — SIGNIFICANT CHANGE UP
HOROWITZ INDEX BLDMV+IHG-RTO: 40 — SIGNIFICANT CHANGE UP
IMM GRANULOCYTES NFR BLD AUTO: 0.3 % — SIGNIFICANT CHANGE UP (ref 0–0.9)
IMM GRANULOCYTES NFR BLD AUTO: 0.3 % — SIGNIFICANT CHANGE UP (ref 0–0.9)
LIPID PNL WITH DIRECT LDL SERPL: 114 MG/DL — HIGH
LIPID PNL WITH DIRECT LDL SERPL: 114 MG/DL — HIGH
LYMPHOCYTES # BLD AUTO: 1.38 K/UL — SIGNIFICANT CHANGE UP (ref 1–3.3)
LYMPHOCYTES # BLD AUTO: 1.38 K/UL — SIGNIFICANT CHANGE UP (ref 1–3.3)
LYMPHOCYTES # BLD AUTO: 21.4 % — SIGNIFICANT CHANGE UP (ref 13–44)
LYMPHOCYTES # BLD AUTO: 21.4 % — SIGNIFICANT CHANGE UP (ref 13–44)
MAGNESIUM SERPL-MCNC: 2.2 MG/DL — SIGNIFICANT CHANGE UP (ref 1.6–2.6)
MAGNESIUM SERPL-MCNC: 2.2 MG/DL — SIGNIFICANT CHANGE UP (ref 1.6–2.6)
MCHC RBC-ENTMCNC: 28.1 PG — SIGNIFICANT CHANGE UP (ref 27–34)
MCHC RBC-ENTMCNC: 28.1 PG — SIGNIFICANT CHANGE UP (ref 27–34)
MCHC RBC-ENTMCNC: 33.5 GM/DL — SIGNIFICANT CHANGE UP (ref 32–36)
MCHC RBC-ENTMCNC: 33.5 GM/DL — SIGNIFICANT CHANGE UP (ref 32–36)
MCV RBC AUTO: 84 FL — SIGNIFICANT CHANGE UP (ref 80–100)
MCV RBC AUTO: 84 FL — SIGNIFICANT CHANGE UP (ref 80–100)
MONOCYTES # BLD AUTO: 0.49 K/UL — SIGNIFICANT CHANGE UP (ref 0–0.9)
MONOCYTES # BLD AUTO: 0.49 K/UL — SIGNIFICANT CHANGE UP (ref 0–0.9)
MONOCYTES NFR BLD AUTO: 7.6 % — SIGNIFICANT CHANGE UP (ref 2–14)
MONOCYTES NFR BLD AUTO: 7.6 % — SIGNIFICANT CHANGE UP (ref 2–14)
MRSA PCR RESULT.: SIGNIFICANT CHANGE UP
MRSA PCR RESULT.: SIGNIFICANT CHANGE UP
NEUTROPHILS # BLD AUTO: 4.41 K/UL — SIGNIFICANT CHANGE UP (ref 1.8–7.4)
NEUTROPHILS # BLD AUTO: 4.41 K/UL — SIGNIFICANT CHANGE UP (ref 1.8–7.4)
NEUTROPHILS NFR BLD AUTO: 68.2 % — SIGNIFICANT CHANGE UP (ref 43–77)
NEUTROPHILS NFR BLD AUTO: 68.2 % — SIGNIFICANT CHANGE UP (ref 43–77)
NON HDL CHOLESTEROL: 174 MG/DL — HIGH
NON HDL CHOLESTEROL: 174 MG/DL — HIGH
NRBC # BLD: 0 /100 WBCS — SIGNIFICANT CHANGE UP (ref 0–0)
NRBC # BLD: 0 /100 WBCS — SIGNIFICANT CHANGE UP (ref 0–0)
O2 CT VFR BLD CALC: 37 MMHG — SIGNIFICANT CHANGE UP (ref 30–65)
O2 CT VFR BLD CALC: 37 MMHG — SIGNIFICANT CHANGE UP (ref 30–65)
O2 CT VFR BLD CALC: 49 MMHG — SIGNIFICANT CHANGE UP (ref 30–65)
O2 CT VFR BLD CALC: 49 MMHG — SIGNIFICANT CHANGE UP (ref 30–65)
O2 CT VFR BLD CALC: 55 MMHG — SIGNIFICANT CHANGE UP (ref 30–65)
O2 CT VFR BLD CALC: 55 MMHG — SIGNIFICANT CHANGE UP (ref 30–65)
OXYHGB MFR BLDMV: 53.9 % — LOW (ref 90–95)
OXYHGB MFR BLDMV: 53.9 % — LOW (ref 90–95)
PCO2 BLDMV: 35 MMHG — SIGNIFICANT CHANGE UP (ref 30–65)
PCO2 BLDMV: 35 MMHG — SIGNIFICANT CHANGE UP (ref 30–65)
PCO2 BLDMV: 37 MMHG — SIGNIFICANT CHANGE UP (ref 30–65)
PH BLDMV: 7.41 — SIGNIFICANT CHANGE UP (ref 7.32–7.45)
PH BLDMV: 7.41 — SIGNIFICANT CHANGE UP (ref 7.32–7.45)
PH BLDMV: 7.43 — SIGNIFICANT CHANGE UP (ref 7.32–7.45)
PH BLDMV: 7.43 — SIGNIFICANT CHANGE UP (ref 7.32–7.45)
PH BLDMV: 7.44 — SIGNIFICANT CHANGE UP (ref 7.32–7.45)
PH BLDMV: 7.44 — SIGNIFICANT CHANGE UP (ref 7.32–7.45)
PHOSPHATE SERPL-MCNC: 2.4 MG/DL — LOW (ref 2.5–4.5)
PHOSPHATE SERPL-MCNC: 2.4 MG/DL — LOW (ref 2.5–4.5)
PLATELET # BLD AUTO: 136 K/UL — LOW (ref 150–400)
PLATELET # BLD AUTO: 136 K/UL — LOW (ref 150–400)
POTASSIUM SERPL-MCNC: 3.9 MMOL/L — SIGNIFICANT CHANGE UP (ref 3.5–5.3)
POTASSIUM SERPL-MCNC: 3.9 MMOL/L — SIGNIFICANT CHANGE UP (ref 3.5–5.3)
POTASSIUM SERPL-SCNC: 3.9 MMOL/L — SIGNIFICANT CHANGE UP (ref 3.5–5.3)
POTASSIUM SERPL-SCNC: 3.9 MMOL/L — SIGNIFICANT CHANGE UP (ref 3.5–5.3)
PROT SERPL-MCNC: 5.8 G/DL — LOW (ref 6–8.3)
PROT SERPL-MCNC: 5.8 G/DL — LOW (ref 6–8.3)
RBC # BLD: 4.69 M/UL — SIGNIFICANT CHANGE UP (ref 4.2–5.8)
RBC # BLD: 4.69 M/UL — SIGNIFICANT CHANGE UP (ref 4.2–5.8)
RBC # FLD: 14.6 % — HIGH (ref 10.3–14.5)
RBC # FLD: 14.6 % — HIGH (ref 10.3–14.5)
S AUREUS DNA NOSE QL NAA+PROBE: SIGNIFICANT CHANGE UP
S AUREUS DNA NOSE QL NAA+PROBE: SIGNIFICANT CHANGE UP
SAO2 % BLD: 54.4 % — LOW (ref 60–90)
SAO2 % BLD: 54.4 % — LOW (ref 60–90)
SAO2 % BLDMV: 70 — SIGNIFICANT CHANGE UP (ref 60–90)
SAO2 % BLDMV: 70 — SIGNIFICANT CHANGE UP (ref 60–90)
SAO2 % BLDMV: 77.3 — SIGNIFICANT CHANGE UP (ref 60–90)
SAO2 % BLDMV: 77.3 — SIGNIFICANT CHANGE UP (ref 60–90)
SAO2 % BLDMV: 83.8 — SIGNIFICANT CHANGE UP (ref 60–90)
SAO2 % BLDMV: 83.8 — SIGNIFICANT CHANGE UP (ref 60–90)
SODIUM SERPL-SCNC: 139 MMOL/L — SIGNIFICANT CHANGE UP (ref 135–145)
SODIUM SERPL-SCNC: 139 MMOL/L — SIGNIFICANT CHANGE UP (ref 135–145)
SPECIMEN SOURCE: SIGNIFICANT CHANGE UP
SPECIMEN SOURCE: SIGNIFICANT CHANGE UP
TRIGL SERPL-MCNC: 344 MG/DL — HIGH
TRIGL SERPL-MCNC: 344 MG/DL — HIGH
TROPONIN T, HIGH SENSITIVITY RESULT: 1226 NG/L — HIGH (ref 0–51)
TROPONIN T, HIGH SENSITIVITY RESULT: 1226 NG/L — HIGH (ref 0–51)
TSH SERPL-MCNC: 0.5 UIU/ML — SIGNIFICANT CHANGE UP (ref 0.27–4.2)
TSH SERPL-MCNC: 0.5 UIU/ML — SIGNIFICANT CHANGE UP (ref 0.27–4.2)
WBC # BLD: 6.46 K/UL — SIGNIFICANT CHANGE UP (ref 3.8–10.5)
WBC # BLD: 6.46 K/UL — SIGNIFICANT CHANGE UP (ref 3.8–10.5)
WBC # FLD AUTO: 6.46 K/UL — SIGNIFICANT CHANGE UP (ref 3.8–10.5)
WBC # FLD AUTO: 6.46 K/UL — SIGNIFICANT CHANGE UP (ref 3.8–10.5)

## 2023-11-03 PROCEDURE — 71045 X-RAY EXAM CHEST 1 VIEW: CPT | Mod: 26

## 2023-11-03 PROCEDURE — 99232 SBSQ HOSP IP/OBS MODERATE 35: CPT

## 2023-11-03 PROCEDURE — 93010 ELECTROCARDIOGRAM REPORT: CPT

## 2023-11-03 PROCEDURE — 99291 CRITICAL CARE FIRST HOUR: CPT | Mod: GC

## 2023-11-03 PROCEDURE — 99233 SBSQ HOSP IP/OBS HIGH 50: CPT

## 2023-11-03 PROCEDURE — 99291 CRITICAL CARE FIRST HOUR: CPT

## 2023-11-03 RX ORDER — POTASSIUM PHOSPHATE, MONOBASIC POTASSIUM PHOSPHATE, DIBASIC 236; 224 MG/ML; MG/ML
15 INJECTION, SOLUTION INTRAVENOUS ONCE
Refills: 0 | Status: COMPLETED | OUTPATIENT
Start: 2023-11-03 | End: 2023-11-03

## 2023-11-03 RX ORDER — BUDESONIDE AND FORMOTEROL FUMARATE DIHYDRATE 160; 4.5 UG/1; UG/1
2 AEROSOL RESPIRATORY (INHALATION)
Refills: 0 | Status: DISCONTINUED | OUTPATIENT
Start: 2023-11-03 | End: 2023-12-25

## 2023-11-03 RX ORDER — INSULIN GLARGINE 100 [IU]/ML
6 INJECTION, SOLUTION SUBCUTANEOUS AT BEDTIME
Refills: 0 | Status: DISCONTINUED | OUTPATIENT
Start: 2023-11-03 | End: 2023-11-04

## 2023-11-03 RX ORDER — POLYETHYLENE GLYCOL 3350 17 G/17G
17 POWDER, FOR SOLUTION ORAL DAILY
Refills: 0 | Status: DISCONTINUED | OUTPATIENT
Start: 2023-11-03 | End: 2023-11-06

## 2023-11-03 RX ORDER — INSULIN LISPRO 100/ML
VIAL (ML) SUBCUTANEOUS
Refills: 0 | Status: DISCONTINUED | OUTPATIENT
Start: 2023-11-03 | End: 2023-11-09

## 2023-11-03 RX ORDER — INSULIN LISPRO 100/ML
VIAL (ML) SUBCUTANEOUS AT BEDTIME
Refills: 0 | Status: DISCONTINUED | OUTPATIENT
Start: 2023-11-03 | End: 2023-11-09

## 2023-11-03 RX ORDER — PANTOPRAZOLE SODIUM 20 MG/1
40 TABLET, DELAYED RELEASE ORAL
Refills: 0 | Status: DISCONTINUED | OUTPATIENT
Start: 2023-11-03 | End: 2023-11-09

## 2023-11-03 RX ORDER — SENNA PLUS 8.6 MG/1
2 TABLET ORAL AT BEDTIME
Refills: 0 | Status: DISCONTINUED | OUTPATIENT
Start: 2023-11-03 | End: 2023-11-04

## 2023-11-03 RX ORDER — ACETAMINOPHEN 500 MG
325 TABLET ORAL ONCE
Refills: 0 | Status: COMPLETED | OUTPATIENT
Start: 2023-11-03 | End: 2023-11-03

## 2023-11-03 RX ORDER — TIOTROPIUM BROMIDE 18 UG/1
2 CAPSULE ORAL; RESPIRATORY (INHALATION) DAILY
Refills: 0 | Status: DISCONTINUED | OUTPATIENT
Start: 2023-11-03 | End: 2023-11-14

## 2023-11-03 RX ORDER — SODIUM NITROPRUSSIDE 50 MG/2ML
0.5 INJECTION INTRAVENOUS
Qty: 100 | Refills: 0 | Status: DISCONTINUED | OUTPATIENT
Start: 2023-11-03 | End: 2023-11-07

## 2023-11-03 RX ORDER — HEPARIN SODIUM 5000 [USP'U]/ML
950 INJECTION INTRAVENOUS; SUBCUTANEOUS
Qty: 25000 | Refills: 0 | Status: DISCONTINUED | OUTPATIENT
Start: 2023-11-03 | End: 2023-11-04

## 2023-11-03 RX ADMIN — Medication 0.25 MILLIGRAM(S): at 22:54

## 2023-11-03 RX ADMIN — TICAGRELOR 90 MILLIGRAM(S): 90 TABLET ORAL at 18:24

## 2023-11-03 RX ADMIN — SODIUM NITROPRUSSIDE 7.42 MICROGRAM(S)/KG/MIN: 50 INJECTION INTRAVENOUS at 21:06

## 2023-11-03 RX ADMIN — INSULIN GLARGINE 6 UNIT(S): 100 INJECTION, SOLUTION SUBCUTANEOUS at 21:48

## 2023-11-03 RX ADMIN — POTASSIUM PHOSPHATE, MONOBASIC POTASSIUM PHOSPHATE, DIBASIC 62.5 MILLIMOLE(S): 236; 224 INJECTION, SOLUTION INTRAVENOUS at 03:49

## 2023-11-03 RX ADMIN — CHLORHEXIDINE GLUCONATE 15 MILLILITER(S): 213 SOLUTION TOPICAL at 01:39

## 2023-11-03 RX ADMIN — CHLORHEXIDINE GLUCONATE 15 MILLILITER(S): 213 SOLUTION TOPICAL at 05:31

## 2023-11-03 RX ADMIN — PANTOPRAZOLE SODIUM 40 MILLIGRAM(S): 20 TABLET, DELAYED RELEASE ORAL at 05:30

## 2023-11-03 RX ADMIN — Medication 0.25 MILLIGRAM(S): at 14:54

## 2023-11-03 RX ADMIN — HEPARIN SODIUM 9.5 UNIT(S)/HR: 5000 INJECTION INTRAVENOUS; SUBCUTANEOUS at 07:53

## 2023-11-03 RX ADMIN — HEPARIN SODIUM 9.5 UNIT(S)/HR: 5000 INJECTION INTRAVENOUS; SUBCUTANEOUS at 20:53

## 2023-11-03 RX ADMIN — ATORVASTATIN CALCIUM 80 MILLIGRAM(S): 80 TABLET, FILM COATED ORAL at 21:48

## 2023-11-03 RX ADMIN — Medication 325 MILLIGRAM(S): at 17:00

## 2023-11-03 RX ADMIN — PROPOFOL 21.5 MICROGRAM(S)/KG/MIN: 10 INJECTION, EMULSION INTRAVENOUS at 07:53

## 2023-11-03 RX ADMIN — HEPARIN SODIUM 9.5 UNIT(S)/HR: 5000 INJECTION INTRAVENOUS; SUBCUTANEOUS at 21:40

## 2023-11-03 RX ADMIN — Medication 325 MILLIGRAM(S): at 16:07

## 2023-11-03 RX ADMIN — Medication 81 MILLIGRAM(S): at 14:54

## 2023-11-03 RX ADMIN — DEXMEDETOMIDINE HYDROCHLORIDE IN 0.9% SODIUM CHLORIDE 11.2 MICROGRAM(S)/KG/HR: 4 INJECTION INTRAVENOUS at 07:53

## 2023-11-03 RX ADMIN — TICAGRELOR 90 MILLIGRAM(S): 90 TABLET ORAL at 05:31

## 2023-11-03 RX ADMIN — SODIUM NITROPRUSSIDE 7.42 MICROGRAM(S)/KG/MIN: 50 INJECTION INTRAVENOUS at 12:15

## 2023-11-03 NOTE — DIETITIAN INITIAL EVALUATION ADULT - ENTERAL
When medically feasible, consider feeds of Glucerna 1.5 at 10ml/hr advancing as tolerated to goal rate of 50ml/hr x 24hr + No Carb Prosource TF x 1 to provide 1200ml volume, 1840kcal, 110g protein, 1002ml free water. Meets ~18kcal/kg based on dosing wt 98.8kg; 1.5g/kg protein based on IBW 72.5kg. Defer additional free water to team.

## 2023-11-03 NOTE — DIETITIAN INITIAL EVALUATION ADULT - OTHER INFO
- Pt is intubated, sedated on Precedex.   - HbA1c 8.3%; noted pt on Ozempic PTA. Ordered for sliding scale insulin for BG management.    Weight Hx:  - Pt 195lbs in March 2023. Dosing wt 218lbs - ?accuracy as wt may be elevated secondary to fluid shifts. Will monitor/trend as able.

## 2023-11-03 NOTE — PROGRESS NOTE ADULT - ATTENDING COMMENTS
Patient is a 57 yo M with HTN, CAD (s/p PCI 2008), HFrEF (EF severely reduced 2018) not on GDMT, CVA 2018 (requiring tpA), DM presenting with chest pressure - with course c/b acute systolic heart failure exacerbation requiring urgent intubation prior to his right/left heart cath    LHC 11/1 found to have chronic total occlusion of LAD and RCA  RHC with elevated RA and PA pressures and CI 1.14 - s/p IABP for mechanical support  SHOCK call was initiated - based upon objective data at that time it was decided to continue with the current course - intubation/IABP/diuresis  echo 11/1 w/ severely decreased EF 32%, s/p IABP 11/1, likely in cardiogenic shock 2/2 acute on chronic HF   pt was found to have a COVID infection simultaneously as well  MRI viability study will be done once the IABP is removed  SBT trial today    Patient is critically ill, requiring critical care services. Despite the current condition, the patient is at a high risk of clinical and hemodynamic demise Patient is a 57 yo M with HTN, CAD (s/p PCI 2008), HFrEF (EF severely reduced 2018) not on GDMT, CVA 2018 (requiring tpA), DM presenting with chest pressure - with course c/b acute systolic heart failure exacerbation requiring urgent intubation prior to his right/left heart cath    LHC 11/1 found to have chronic total occlusion of LAD and RCA  RHC with elevated RA and PA pressures and CI 1.14 - s/p IABP for mechanical support  SHOCK call was initiated - based upon objective data at that time it was decided to continue with the current course - intubation/IABP/diuresis  echo 11/1 w/ severely decreased EF 32%, s/p IABP 11/1, likely in cardiogenic shock 2/2 acute on chronic HF   pt was found to have a COVID infection simultaneously as well  MRI viability study will be done once the IABP is removed  SBT trial today to extubate    Patient is critically ill, requiring critical care services. Despite the current condition, the patient is at a high risk of clinical and hemodynamic demise

## 2023-11-03 NOTE — PROGRESS NOTE ADULT - SUBJECTIVE AND OBJECTIVE BOX
SUBJ: Patient found in bed in NAD, still intubated but fully awake and following commands. He is communicating via writing. He is on pressure support and ready to be extubated. He remains on support with IABP on 1:1, no pressors/inotropes.  Hemodynamics this am show RA 5, PA 27/12/18, CO/CI 5.6/2.6, MAP 90-100s. He has good urine output and stable renal function. LA 1.2         MEDICATIONS  (STANDING):  acetaminophen     Tablet .. 325 milliGRAM(s) Oral once  albuterol    0.083% 2.5 milliGRAM(s) Nebulizer daily  aspirin  chewable 81 milliGRAM(s) Oral daily  atorvastatin 80 milliGRAM(s) Oral at bedtime  budesonide  80 MICROgram(s)/formoterol 4.5 MICROgram(s) Inhaler 2 Puff(s) Inhalation two times a day  chlorhexidine 2% Cloths 1 Application(s) Topical daily  dextrose 50% Injectable 12.5 Gram(s) IV Push once  dextrose 50% Injectable 25 Gram(s) IV Push once  dextrose 50% Injectable 25 Gram(s) IV Push once  glucagon  Injectable 1 milliGRAM(s) IntraMuscular once  heparin  Infusion 950 Unit(s)/Hr (9.5 mL/Hr) IV Continuous <Continuous>  insulin glargine Injectable (LANTUS) 6 Unit(s) SubCutaneous at bedtime  insulin lispro (ADMELOG) corrective regimen sliding scale   SubCutaneous every 6 hours  nitroprusside Infusion 0.5 MICROgram(s)/kG/Min (7.42 mL/Hr) IV Continuous <Continuous>  pantoprazole    Tablet 40 milliGRAM(s) Oral before breakfast  polyethylene glycol 3350 17 Gram(s) Oral daily  senna 2 Tablet(s) Oral at bedtime  ticagrelor 90 milliGRAM(s) Oral every 12 hours  tiotropium 2.5 MICROgram(s) Inhaler 2 Puff(s) Inhalation daily    MEDICATIONS  (PRN):  dextrose Oral Gel 15 Gram(s) Oral once PRN Blood Glucose LESS THAN 70 milliGRAM(s)/deciliter            Vital Signs Last 24 Hrs  T(C): 37.3 (03 Nov 2023 11:00), Max: 37.3 (03 Nov 2023 11:00)  T(F): 99.1 (03 Nov 2023 11:00), Max: 99.1 (03 Nov 2023 11:00)  HR: 87 (03 Nov 2023 12:45) (72 - 94)  BP: --  BP(mean): --  RR: 18 (03 Nov 2023 12:45) (12 - 27)  SpO2: 96% (03 Nov 2023 12:45) (92% - 99%)    Parameters below as of 03 Nov 2023 12:00  Patient On (Oxygen Delivery Method): nasal cannula, high flow  O2 Flow (L/min): 30  O2 Concentration (%): 30     REVIEW OF SYSTEMS:  CONSTITUTIONAL: No fever, weight loss, or fatigue  CARDIOLOGY: PAtient denies chest pain, shortness of breath or syncopal episodes.   RESPIRATORY: denies shortness of breath, wheezeing.   NEUROLOGICAL: NO weakness, no focal deficits to report.  ENDOCRINOLOGICAL: no recent change in diabetic medications.   GI: no BRBPR, no N,V,diarrhea.    PSYCHIATRY: normal mood and affect  HEENT: no nasal discharge, no ecchymosis  SKIN: no ecchymosis, no breakdown  MUSCULOSKELETAL: Full range of motion x4.        PHYSICAL EXAM:  · CONSTITUTIONAL:	Well-developed, well nourished    BMI-  ·RESPIRATORY:   airway patent; breath sounds equal; good air movement; respirations non-labored; clear to auscultation bilaterally; no chest wall tenderness; no intercostal retractions; no rales,rhonchi or wheeze  · CARDIOVASCULAR	regular rate and rhythm  no rub  no murmur  normal PMI  · EXTREMITIES: No cyanosis, clubbing or edema  · VASCULAR: 	Equal and normal pulses (carotid, femoral, dorsalis pedis)  	  TELEMETRY:    ECG:    TTE:    LABS:                        13.2   6.46  )-----------( 136      ( 03 Nov 2023 01:49 )             39.4     11-03    139  |  109<H>  |  14  ----------------------------<  105<H>  3.9   |  20<L>  |  1.02    Ca    8.5      03 Nov 2023 01:49  Phos  2.4     11-03  Mg     2.2     11-03    TPro  5.8<L>  /  Alb  2.9<L>  /  TBili  0.4  /  DBili  x   /  AST  70<H>  /  ALT  53<H>  /  AlkPhos  42  11-03        PT/INR - ( 01 Nov 2023 22:22 )   PT: 12.9 sec;   INR: 1.18 ratio         PTT - ( 03 Nov 2023 14:31 )  PTT:84.8 sec    I&O's Summary    02 Nov 2023 07:01  -  03 Nov 2023 07:00  --------------------------------------------------------  IN: 1093.2 mL / OUT: 1941 mL / NET: -847.8 mL    03 Nov 2023 07:01  -  03 Nov 2023 15:48  --------------------------------------------------------  IN: 129 mL / OUT: 315 mL / NET: -186 mL      BNP  RADIOLOGY & ADDITIONAL STUDIES:    IMPRESSION AND PLAN:

## 2023-11-03 NOTE — PROGRESS NOTE ADULT - SUBJECTIVE AND OBJECTIVE BOX
LD VALENCIA  58y Male  MRN:[id number]    Patient is a 58y old  Male who presents with a chief complaint of NSTEMI (01 Nov 2023 20:29)    HPI:  59yo M w/ hx HTN, CAD w/ 1 stent in 2009, ICH (2008) presenting with abn ekg. Patient presented to [facility] where he was found to have STEMI, recommended to get cath however patient did not want to get it there so it left and came here.  Patient initially had cough, congestion, fever, was placed on antibiotics on Sunday.  Started feeling nauseous and had a presyncopal event after which he presented to ED last night.  Had chest pain as well.  Chest pain is midsternal.  Not currently having chest pain.  Received 4 aspirin 30 min pta. (01 Nov 2023 15:11)      Patient seen and evaluated at bedside in CCU. interval events noted    Interval HPI: pt extubated. still with IABP    PAST MEDICAL & SURGICAL HISTORY:  HTN (hypertension)      CAD (coronary artery disease)  2009; stent      Intracranial hemorrhage  2008      Respiratory arrest  december 1st      Myocardial infarction, unspecified MI type, unspecified artery      History of coronary artery stent placement          REVIEW OF SYSTEMS:  as per hpi     VITALS:   ICU Vital Signs Last 24 Hrs  T(C): 37.3 (03 Nov 2023 11:00), Max: 37.3 (03 Nov 2023 11:00)  T(F): 99.1 (03 Nov 2023 11:00), Max: 99.1 (03 Nov 2023 11:00)  HR: 87 (03 Nov 2023 12:45) (72 - 94)  BP: --  BP(mean): --  ABP: 94/66 (03 Nov 2023 12:45) (93/62 - 134/84)  ABP(mean): 81 (03 Nov 2023 12:45) (80 - 114)  RR: 18 (03 Nov 2023 12:45) (12 - 27)  SpO2: 96% (03 Nov 2023 12:45) (92% - 100%)    O2 Parameters below as of 03 Nov 2023 12:00  Patient On (Oxygen Delivery Method): nasal cannula, high flow  O2 Flow (L/min): 30  O2 Concentration (%): 30          PHYSICAL EXAM:  GENERAL: NAD, well-developed  HEAD:  Atraumatic, Normocephalic  EYES: EOMI, PERRLA, conjunctiva and sclera clear  NECK: Supple, No JVD  CHEST/LUNG: Clear to auscultation bilaterally; No wheeze  HEART: Regular rate and rhythm; No murmurs, rubs, or gallops  ABDOMEN: Soft, Nontender, Nondistended; Bowel sounds present  EXTREMITIES:  2+ Peripheral Pulses, No clubbing, cyanosis, or edema  PSYCH: AAOx3  NEUROLOGY: non-focal  SKIN: No rashes or lesions    Consultant(s) Notes Reviewed:  [x ] YES  [ ] NO  Care Discussed with Consultants/Other Providers [ x] YES  [ ] NO    MEDS:   MEDICATIONS  (STANDING):  albuterol    0.083% 2.5 milliGRAM(s) Nebulizer daily  aspirin  chewable 81 milliGRAM(s) Oral daily  atorvastatin 80 milliGRAM(s) Oral at bedtime  chlorhexidine 2% Cloths 1 Application(s) Topical daily  dextrose 50% Injectable 12.5 Gram(s) IV Push once  dextrose 50% Injectable 25 Gram(s) IV Push once  dextrose 50% Injectable 25 Gram(s) IV Push once  glucagon  Injectable 1 milliGRAM(s) IntraMuscular once  heparin  Infusion 1000 Unit(s)/Hr (9.5 mL/Hr) IV Continuous <Continuous>  insulin glargine Injectable (LANTUS) 6 Unit(s) SubCutaneous at bedtime  insulin lispro (ADMELOG) corrective regimen sliding scale   SubCutaneous every 6 hours  montelukast 10 milliGRAM(s) Oral daily  nitroprusside Infusion 0.5 MICROgram(s)/kG/Min (7.42 mL/Hr) IV Continuous <Continuous>  pantoprazole  Injectable 40 milliGRAM(s) IV Push every 12 hours  ticagrelor 90 milliGRAM(s) Oral every 12 hours    MEDICATIONS  (PRN):  dextrose Oral Gel 15 Gram(s) Oral once PRN Blood Glucose LESS THAN 70 milliGRAM(s)/deciliter      ALLERGIES:  penicillins (Unknown)      LABS:                                            13.2   6.46  )-----------( 136      ( 03 Nov 2023 01:49 )             39.4     11-03    139  |  109<H>  |  14  ----------------------------<  105<H>  3.9   |  20<L>  |  1.02    Ca    8.5      03 Nov 2023 01:49  Phos  2.4     11-03  Mg     2.2     11-03    TPro  5.8<L>  /  Alb  2.9<L>  /  TBili  0.4  /  DBili  x   /  AST  70<H>  /  ALT  53<H>  /  AlkPhos  42  11-03     < from: Xray Chest 1 View- PORTABLE-Urgent (11.01.23 @ 07:42) >    IMPRESSION:  Clear lungs.    ---End of Report ---        < end of copied text >  < from: TTE W or WO Ultrasound Enhancing Agent (11.01.23 @ 10:23) >  _____________________________     CONCLUSIONS:      1. Left ventricular cavity is moderately dilated. Left ventricular wall thickness is normal. Left ventricular systolic function is severely decreased with an ejection fraction of 32 % by Chinchilla's method of disks. Regional wall motion abnormalities present.   2. Multiple segmental abnormalities exist. See findings.   3. There is moderate (grade 2) left ventricular diastolic dysfunction, with indeterminant filling pressure.   4. Normal right ventricular cavity size, wall thickness, and systolic function.   5. No significant valvular disease.   6. No pericardial effusion seen.   7. Compared to the transthoracic echocardiogram performed on 1/25/2017 the areas of akinesis are unchanged but there has been a decline in LV systolic function with new areas of hypokinesis.    __________________________________________________________________    < end of copied text >  < from: TTE Limited W or WO Ultrasound Enhancing Agent (11.02.23 @ 07:41) >  __________________________     CONCLUSIONS:      1. After obtaining consent, Definity ultrasound enhancing agent was given for enhanced left ventricular opacification and improved delineation of the left ventricular endocardial borders. Left ventricular systolic function is severely decreased with a calculated ejection fraction of 22 % by the Chinchilla's biplane method of disks. There is a left ventricular thrombus.   2. Findings were discussed with Detomolinko PA on 11/2/2023 at 8.49am.   3. There is a left ventricular thrombus.    _________________________________________________________________    < end of copied text >

## 2023-11-03 NOTE — PROGRESS NOTE ADULT - ASSESSMENT
57 yo male h/o htn, cad s/p pci, ICH, here with NSTEMI  s/p intubation and cath. now in CCU    NSTEMI  s/p  cath  cath results noted. multi vessel dz., CTSx f/u. will need viability study  hep gtt  IABP    resp failure  s/p intubation  now extubated     LV thrombus  on hep gtt    acute on chronic systolic heart failure  heart failure team f/u  IABP  diuresis    pna  recently diagnosed outpt  iv abx  f/u cult   id following     covid  supportive care    h/o ICH  resolved    mngt as per CCU team  d/w CCU attn    d/w pts pmd        Advanced care planning was discussed with patient and family.  Advanced care planning forms were reviewed and discussed as appropriate.  Differential diagnosis and plan of care discussed with patient after the evaluation.   Pain assessed and judicious use of narcotics when appropriate was discussed.  Importance of Fall prevention discussed.  Counseling on Smoking and Alcohol cessation was offered when appropriate.  Counseling on Diet, exercise, and medication compliance was done.       Approx 75 minutes spent.

## 2023-11-03 NOTE — PROGRESS NOTE ADULT - SUBJECTIVE AND OBJECTIVE BOX
LD VALENCIA  MRN-[id number]  Patient is a 58y old  Male who presents with a chief complaint of Pt is a58 yo M with HTN, CAD (s/p PCI ), HFrEF (EF severely reduced ) not on GDMT, CVA 2018 (requiring tpA), DM presenting with chest pressure and unknown tachycardia that was shocked x1, LHC  found to have chronic total occlusion of LAD and RCA, with elevated RA and PA pressures, echo  w/ severely decreased EF 32%, s/p IABP , likely in cardiogenic shock 2/2 acute on chronic HF s/p unknown shockable rhythm.   (2023 10:08)    HPI:  pt seen and approx 1:30 pm  in [facility]    59yo M w/ hx HTN, CAD w/ 1 stent in , ICH () presenting with abn ekg. Patient presented to [facility] where he was found to have STEMI, recommended to get cath however patient did not want to get it there so it left and came here.  Patient initially had cough, congestion, fever, was placed on antibiotics on .  Started feeling nauseous and had a presyncopal event after which he presented to ED last night.  Had chest pain as well.  Chest pain is midsternal.  Not currently having chest pain.  Received 4 aspirin 30 min pta. (2023 15:11)      Hospital Course:    24 HOUR EVENTS:    REVIEW OF SYSTEMS:    CONSTITUTIONAL: No weakness, fevers or chills  EYES/ENT: No visual changes;  No vertigo or throat pain   NECK: No pain or stiffness  RESPIRATORY: No cough, wheezing, hemoptysis; No shortness of breath  CARDIOVASCULAR: No chest pain or palpitations  GASTROINTESTINAL: No abdominal or epigastric pain. No nausea, vomiting, or hematemesis; No diarrhea or constipation. No melena or hematochezia.  GENITOURINARY: No dysuria, frequency or hematuria  NEUROLOGICAL: No numbness or weakness  SKIN: No itching, rashes      ICU Vital Signs Last 24 Hrs  T(C): 37.7 (2023 20:00), Max: 37.9 (2023 16:00)  T(F): 99.9 (2023 20:00), Max: 100.2 (2023 16:00)  HR: 98 (2023 21:00) (76 - 102)  BP: --  BP(mean): --  ABP: 118/68 (2023 21:00) (93/62 - 134/84)  ABP(mean): 88 (2023 21:00) (80 - 114)  RR: 20 (:00) (12 - 26)  SpO2: 95% (:00) (93% - 99%)    O2 Parameters below as of :  Patient On (Oxygen Delivery Method): nasal cannula  O2 Flow (L/min): 3        Mode: AC/ CMV (Assist Control/ Continuous Mandatory Ventilation), RR (machine): 16, TV (machine): 500, FiO2: 40, PEEP: 5, ITime: 1  CVP(mm Hg): 1 (23 @ 21:00) (0 - 26)  CO: 5.6 (23 @ 02:00) (5.6 - 5.6)  CI: 2.6 (23 @ 02:00) (2.6 - 2.6)  PA: 19/8 (23 @ 21:00) (12/1 - 49/32)  PA(mean): 12 (23 @ 21:00) (7 - 40)  PA(direct): --  PCWP: --  LA: --  RA: --  SVR: 1242 (23 @ 02:00) (1242 - 1242)  SVRI: --  PVR: --  PVRI: --    POCT Blood Glucose.: 109 mg/dL (23 @ 21:44)  POCT Blood Glucose.: 91 mg/dL (23 @ 05:43)  POCT Blood Glucose.: 114 mg/dL (23 @ 23:10)    CAPILLARY BLOOD GLUCOSE      POCT Blood Glucose.: 109 mg/dL (2023 21:44)      PHYSICAL EXAM:  GENERAL: No acute distress, well-developed  HEAD:  Atraumatic, Normocephalic  EYES: EOMI, PERRLA, conjunctiva and sclera clear  NECK: Supple, no lymphadenopathy, no JVD  CHEST/LUNG: CTAB; No wheezes, rales, or rhonchi  HEART: Regular rate and rhythm. Normal S1/S2. No murmurs, rubs, or gallops  ABDOMEN: Soft, non-tender, non-distended; normal bowel sounds, no organomegaly  EXTREMITIES:  2+ peripheral pulses b/l, No clubbing, cyanosis, or edema  NEUROLOGY: A&O x 3, no focal deficits  SKIN: No rashes or lesions    ============================I/O===========================   I&O's Detail    2023 07:01  -  2023 07:00  --------------------------------------------------------  IN:    Dexmedetomidine: 29.6 mL    Enteral Tube Flush: 120 mL    Heparin: 275.5 mL    Norepinephrine: 20.2 mL    Norepinephrine: 11.1 mL    Propofol: 636.8 mL  Total IN: 1093.2 mL    OUT:    Indwelling Catheter - Urethral (mL): 1941 mL  Total OUT: 1941 mL    Total NET: -847.8 mL      2023 07:01  -  2023 22:04  --------------------------------------------------------  IN:    Dexmedetomidine: 74.1 mL    Heparin: 76 mL    Heparin: 47.5 mL    Nitroprusside: 197.1 mL    Oral Fluid: 480 mL  Total IN: 874.7 mL    OUT:    Indwelling Catheter - Urethral (mL): 790 mL    Norepinephrine: 0 mL    Propofol: 0 mL  Total OUT: 790 mL    Total NET: 84.7 mL        ============================ LABS =========================                        13.2   6.46  )-----------( 136      ( 2023 01:49 )             39.4         139  |  109<H>  |  14  ----------------------------<  105<H>  3.9   |  20<L>  |  1.02    Ca    8.5      2023 01:49  Phos  2.4       Mg     2.2         TPro  5.8<L>  /  Alb  2.9<L>  /  TBili  0.4  /  DBili  x   /  AST  70<H>  /  ALT  53<H>  /  AlkPhos  42      Troponin T, High Sensitivity Result: 1226 ng/L (23 @ 01:49)              LIVER FUNCTIONS - ( 2023 01:49 )  Alb: 2.9 g/dL / Pro: 5.8 g/dL / ALK PHOS: 42 U/L / ALT: 53 U/L / AST: 70 U/L / GGT: x           PT/INR - ( 2023 22:22 )   PT: 12.9 sec;   INR: 1.18 ratio         PTT - ( 2023 20:04 )  PTT:77.0 sec  ABG - ( 2023 18:07 )  pH, Arterial: 7.46  pH, Blood: x     /  pCO2: 29    /  pO2: 109   / HCO3: 21    / Base Excess: -2.1  /  SaO2: 98.8              Blood Gas Arterial, Lactate: 0.9 mmol/L (23 @ 18:07)  Blood Gas Arterial, Lactate: 1.0 mmol/L (23 @ 12:16)  Blood Gas Arterial, Lactate: 0.9 mmol/L (23 @ 09:50)  Blood Gas Arterial, Lactate: 0.9 mmol/L (23 @ 01:47)    Urinalysis Basic - ( 2023 01:49 )    Color: x / Appearance: x / SG: x / pH: x  Gluc: 105 mg/dL / Ketone: x  / Bili: x / Urobili: x   Blood: x / Protein: x / Nitrite: x   Leuk Esterase: x / RBC: x / WBC x   Sq Epi: x / Non Sq Epi: x / Bacteria: x      ======================Micro/Rad/Cardio=================  Telemtry: Reviewed   EKG: Reviewed  CXR: Reviewed  Culture: Reviewed   Echo:   Cath: Cardiac Cath Lab - Adult:   [facility]  Department of Cardiology  [address] [place] [address]  [phone]  Cath Lab Report -- Comprehensive Report  Patient: LD VALENCIA  Study date: 2017  Account number: [id number]  MR number: [id number]  : 1965  Gender: Male  Race: W  Case Physician(s):  Jairon Holguin M.D.  Referring Physician:  Luc Lynn M.D.  INDICATIONS: Unstable angina - CCS4.  HISTORY: The patient has a history of coronary artery disease. The patient  hashypertension and medication-treated dyslipidemia.  PROCEDURE:  --  Left heart catheterization with ventriculography.  --  Left coronary angiography.  --  Right coronary angiography.  TECHNIQUE: The risks and alternatives of the procedures and conscious  sedation were explained to the patient and informed consent was obtained.  Cardiac catheterization performed electively.  Local anesthetic given. Right radial artery access. A 6FR PRELUDE KIT was  inserted in the vessel. Left heart catheterization. Ventriculography was  performed. A 5FR FR4.0 EXPO catheter was utilized. Left coronary artery  angiography. The vessel was injected utilizing a 5FR FL3.5 EXPO catheter.  Right coronary artery angiography. The vessel was injected utilizing a 5FR  FR4.0 EXPO catheter. RADIATION EXPOSURE: 1.1 min.  CONTRAST GIVEN: Omnipaque 55 ml.  MEDICATIONS GIVEN: Midazolam, 1 mg, IV. Fentanyl, 25 mcg, IV. Verapamil  (Isoptin, Calan, Covera), 2.5 mg, IA. Heparin, 3000 units, IA.  VENTRICLES: Global left ventricular function was moderately depressed. EF  estimated was 40 %.  CORONARY VESSELS: The coronary circulation is right dominant.  LM:   --  LM: Normal.  LAD:   --  Proximal LAD: There was a 50 % stenosis.  CX:   --  Circumflex: Normal.  RCA:   --  Mid RCA: There was a 40 % stenosis.  --  Distal RCA: There was a 50 % stenosis.  COMPLICATIONS: There were no complications.  DIAGNOSTIC RECOMMENDATIONS: The patient should continue with the present  medications.  Prepared and signed by  Jairon Holguin M.D.  Signed 2017 12:20:13  HEMODYNAMIC TABLES  Pressures:  Baseline/ Room Air  Pressures:  - HR: 78  Pressures:  - Rhythm:  Pressures:  -- Aortic Pressure (S/D/M): --/--/99  Pressures:  -- Left Ventricle (s/edp): 157/39/--  Outputs:  Baseline/ Room Air  Outputs:  -- CALCULATIONS[age]  Outputs:  -- CALCULATIONS: Body Surface Area: 2.05  Outputs:  -- CALCULATIONS: Height in cm: 175.00  Outputs:  -- CALCULATIONS: Sex: Male  Outputs:  -- CALCULATIONS: Weight in k.40 (17 @ 21:55)    ======================================================  PAST MEDICAL & SURGICAL HISTORY:  HTN (hypertension)      CAD (coronary artery disease)  ; stent      Intracranial hemorrhage        Respiratory arrest        Myocardial infarction, unspecified MI type, unspecified artery      History of coronary artery stent placement        ====================ASSESSMENT ==============            Plan:  ====================CARDIOVASCULAR==================    Mechaincal Circulatory Support [ ] IABP,  [ ] Impella 2.5,  [ ] Impella CP  Settings:     ==Hemodynamics==    CVP:   PCWP:  PA S/D:   Cardiac Output:  Cardiac Index:   SVR:      ==Coronaries==    Last ischemic workup:   Antiplatelet regimen:   Anticoagulant:   Statin:   Beta blocker:      ==Pump==    LVEF:                              Regional Wall Motion Abnormaility?:  [ ]Yes   [ ] No, If Yes, Details  Diastolic function:  RV function:   Any change frim prior?: [ ] Yes   [ ] No, If Yes, Details:       ==Rhythm==    Current rhythm:  AM EKG Interpretation:   Anti-arrhythmic therapies:   TVP with settings:     nitroprusside Infusion 0.5 MICROgram(s)/kG/Min (7.42 mL/Hr) IV Continuous <Continuous>      ====================== NEUROLOGY=====================    ==================== RESPIRATORY======================  Mechanical Ventilation:  Mode: AC/ CMV (Assist Control/ Continuous Mandatory Ventilation)  RR (machine): 16  TV (machine): 500  FiO2: 40  PEEP: 5  ITime: 1  MAP: 11  PIP: 28    ABG - ( 2023 18:07 )  pH, Arterial: 7.46  pH, Blood: x     /  pCO2: 29    /  pO2: 109   / HCO3: 21    / Base Excess: -2.1  /  SaO2: 98.8                albuterol    0.083% 2.5 milliGRAM(s) Nebulizer daily  budesonide  80 MICROgram(s)/formoterol 4.5 MICROgram(s) Inhaler 2 Puff(s) Inhalation two times a day  tiotropium 2.5 MICROgram(s) Inhaler 2 Puff(s) Inhalation daily    ===================== RENAL =========================    23 @ 07:  -  23 @ 07:00  --------------------------------------------------------  IN: 1093.2 mL / OUT: 1941 mL / NET: -847.8 mL    23 @ 07:  -  23 @ 22:04  --------------------------------------------------------  IN: 874.7 mL / OUT: 790 mL / NET: 84.7 mL      Renal Replacement Therapy:  [ ] CRRT      [ ] IHD, Last Session:    Fluid removal:     [ ] Diuretic therapy, Regimen:       ==================== GASTROINTESTINAL===================    Last BM:   Indication for Stress Ulcer Prophylaxis, [ ] Yes    [ ] No   If Yes, Medication:     pantoprazole    Tablet 40 milliGRAM(s) Oral before breakfast  polyethylene glycol 3350 17 Gram(s) Oral daily  senna 2 Tablet(s) Oral at bedtime    ========================INFECTIOUS DISEASE================  T(C): 37.7 (23 @ 20:00), Max: 37.9 (23 @ 16:00)  WBC Count: 6.46 K/uL (23 @ 01:49)  WBC Count: 7.56 K/uL (23 @ 05:34)  WBC Count: 6.53 K/uL (23 @ 22:22)  WBC Count: 8.24 K/uL (23 @ 06:14)      Culture - Sputum (collected 23 @ 15:17)  Source: ET Tube ET Tube  Gram Stain (23 @ 06:50):    Rare polymorphonuclear leukocytes per low power field    No Squamous epithelial cells per low power field    No organisms seen per oil power field  Preliminary Report (23 @ 16:12):    No growth to date.    Culture - Blood (collected 23 @ 14:46)  Source: .Blood Blood  Preliminary Report (23 @ 19:02):    No growth at 24 hours    Culture - Blood (collected 23 @ 14:34)  Source: .Blood Blood  Preliminary Report (23 @ 19:02):    No growth at 24 hours        Current Antibiotics with start date:       ===================HEMATOLOGIC/ONC ===================  Hemoglobin: 13.2 g/dL (23 @ 01:49)  Hemoglobin: 13.8 g/dL (23 @ 05:34)  Hemoglobin: 14.2 g/dL (23 @ 22:22)  Hemoglobin: 14.7 g/dL (23 @ 06:14)    Platelet Count - Automated: 136 K/uL (23 @ 01:49)  Platelet Count - Automated: 134 K/uL (23 @ 05:34)  Platelet Count - Automated: 148 K/uL (23 @ 22:22)  Platelet Count - Automated: 173 K/uL (23 @ 06:14)    Chemical VTE Prophylaxis:  [ ] Lovenox    [ ] SQH   [ ]NA  Systemic Anticogaulation:  [ ] Yes    [ ] No,  If Yes, Medication:     aspirin  chewable 81 milliGRAM(s) Oral daily  heparin  Infusion 950 Unit(s)/Hr (9.5 mL/Hr) IV Continuous <Continuous>  ticagrelor 90 milliGRAM(s) Oral every 12 hours    =======================    ENDOCRINE  =====================  POCT Blood Glucose.: 109 mg/dL (23 @ 21:44)  POCT Blood Glucose.: 91 mg/dL (23 @ 05:43)  POCT Blood Glucose.: 114 mg/dL (23 @ 23:10)        atorvastatin 80 milliGRAM(s) Oral at bedtime  dextrose 50% Injectable 12.5 Gram(s) IV Push once  dextrose 50% Injectable 25 Gram(s) IV Push once  dextrose 50% Injectable 25 Gram(s) IV Push once  dextrose Oral Gel 15 Gram(s) Oral once PRN Blood Glucose LESS THAN 70 milliGRAM(s)/deciliter  glucagon  Injectable 1 milliGRAM(s) IntraMuscular once  insulin glargine Injectable (LANTUS) 6 Unit(s) SubCutaneous at bedtime  insulin lispro (ADMELOG) corrective regimen sliding scale   SubCutaneous three times a day before meals  insulin lispro (ADMELOG) corrective regimen sliding scale   SubCutaneous at bedtime      Patient requires continuous monitoring with bedside rhythm monitoring, pulse ox monitoring, and intermittent blood gas analysis. Care plan discussed with ICU care team. Patient remained critical and at risk for life threatening decompensation.  Patient seen, examined and plan discussed with CCU team during rounds.       I have personally provided __30__ minutes of critical care time excluding time spent on separate procedures, in addition to initial critical care time provided by the CICU Attending, Dr. Cunningham.       LD VALENCIA  MRN-[id number]  Patient is a 58y old  Male who presents with a chief complaint of Pt is a58 yo M with HTN, CAD (s/p PCI ), HFrEF (EF severely reduced ) not on GDMT, CVA 2018 (requiring tpA), DM presenting with chest pressure and unknown tachycardia that was shocked x1, LHC  found to have chronic total occlusion of LAD and RCA, with elevated RA and PA pressures, echo  w/ severely decreased EF 32%, s/p IABP , likely in cardiogenic shock 2/2 acute on chronic HF s/p unknown shockable rhythm.   (2023 10:08)    HPI:  pt seen and approx 1:30 pm  in [facility]    57yo M w/ hx HTN, CAD w/ 1 stent in , ICH () presenting with abn ekg. Patient presented to [facility] where he was found to have STEMI, recommended to get cath however patient did not want to get it there so it left and came here.  Patient initially had cough, congestion, fever, was placed on antibiotics on .  Started feeling nauseous and had a presyncopal event after which he presented to ED last night.  Had chest pain as well.  Chest pain is midsternal.  Not currently having chest pain.  Received 4 aspirin 30 min pta. (2023 15:11)      Hospital Course:    24 HOUR EVENTS:    REVIEW OF SYSTEMS:    CONSTITUTIONAL: No weakness, fevers or chills  EYES/ENT: No visual changes;  No vertigo or throat pain   NECK: No pain or stiffness  RESPIRATORY: No cough, wheezing, hemoptysis; No shortness of breath  CARDIOVASCULAR: No chest pain or palpitations  GASTROINTESTINAL: No abdominal or epigastric pain. No nausea, vomiting, or hematemesis; No diarrhea or constipation. No melena or hematochezia.  GENITOURINARY: No dysuria, frequency or hematuria  NEUROLOGICAL: No numbness or weakness  SKIN: No itching, rashes      ICU Vital Signs Last 24 Hrs  T(C): 37.7 (2023 20:00), Max: 37.9 (2023 16:00)  T(F): 99.9 (2023 20:00), Max: 100.2 (2023 16:00)  HR: 98 (2023 21:00) (76 - 102)  BP: --  BP(mean): --  ABP: 118/68 (2023 21:00) (93/62 - 134/84)  ABP(mean): 88 (2023 21:00) (80 - 114)  RR: 20 (:00) (12 - 26)  SpO2: 95% (:00) (93% - 99%)    O2 Parameters below as of :  Patient On (Oxygen Delivery Method): nasal cannula  O2 Flow (L/min): 3        Mode: AC/ CMV (Assist Control/ Continuous Mandatory Ventilation), RR (machine): 16, TV (machine): 500, FiO2: 40, PEEP: 5, ITime: 1  CVP(mm Hg): 1 (23 @ 21:00) (0 - 26)  CO: 5.6 (23 @ 02:00) (5.6 - 5.6)  CI: 2.6 (23 @ 02:00) (2.6 - 2.6)  PA: 19/8 (23 @ 21:00) (12/1 - 49/32)  PA(mean): 12 (23 @ 21:00) (7 - 40)  PA(direct): --  PCWP: --  LA: --  RA: --  SVR: 1242 (23 @ 02:00) (1242 - 1242)  SVRI: --  PVR: --  PVRI: --    POCT Blood Glucose.: 109 mg/dL (23 @ 21:44)  POCT Blood Glucose.: 91 mg/dL (23 @ 05:43)  POCT Blood Glucose.: 114 mg/dL (23 @ 23:10)    CAPILLARY BLOOD GLUCOSE      POCT Blood Glucose.: 109 mg/dL (2023 21:44)      PHYSICAL EXAM:  GENERAL: No acute distress, well-developed  HEAD:  Atraumatic, Normocephalic  EYES: EOMI, PERRLA, conjunctiva and sclera clear  NECK: Supple, no lymphadenopathy, no JVD  CHEST/LUNG: CTAB; No wheezes, rales, or rhonchi  HEART: Regular rate and rhythm. Normal S1/S2. No murmurs, rubs, or gallops  ABDOMEN: Soft, non-tender, non-distended; normal bowel sounds, no organomegaly  EXTREMITIES:  2+ peripheral pulses b/l, No clubbing, cyanosis, or edema  NEUROLOGY: A&O x 3, no focal deficits  SKIN: No rashes or lesions    ============================I/O===========================   I&O's Detail    2023 07:01  -  2023 07:00  --------------------------------------------------------  IN:    Dexmedetomidine: 29.6 mL    Enteral Tube Flush: 120 mL    Heparin: 275.5 mL    Norepinephrine: 20.2 mL    Norepinephrine: 11.1 mL    Propofol: 636.8 mL  Total IN: 1093.2 mL    OUT:    Indwelling Catheter - Urethral (mL): 1941 mL  Total OUT: 1941 mL    Total NET: -847.8 mL      2023 07:01  -  2023 22:04  --------------------------------------------------------  IN:    Dexmedetomidine: 74.1 mL    Heparin: 76 mL    Heparin: 47.5 mL    Nitroprusside: 197.1 mL    Oral Fluid: 480 mL  Total IN: 874.7 mL    OUT:    Indwelling Catheter - Urethral (mL): 790 mL    Norepinephrine: 0 mL    Propofol: 0 mL  Total OUT: 790 mL    Total NET: 84.7 mL        ============================ LABS =========================                        13.2   6.46  )-----------( 136      ( 2023 01:49 )             39.4         139  |  109<H>  |  14  ----------------------------<  105<H>  3.9   |  20<L>  |  1.02    Ca    8.5      2023 01:49  Phos  2.4       Mg     2.2         TPro  5.8<L>  /  Alb  2.9<L>  /  TBili  0.4  /  DBili  x   /  AST  70<H>  /  ALT  53<H>  /  AlkPhos  42      Troponin T, High Sensitivity Result: 1226 ng/L (23 @ 01:49)              LIVER FUNCTIONS - ( 2023 01:49 )  Alb: 2.9 g/dL / Pro: 5.8 g/dL / ALK PHOS: 42 U/L / ALT: 53 U/L / AST: 70 U/L / GGT: x           PT/INR - ( 2023 22:22 )   PT: 12.9 sec;   INR: 1.18 ratio         PTT - ( 2023 20:04 )  PTT:77.0 sec  ABG - ( 2023 18:07 )  pH, Arterial: 7.46  pH, Blood: x     /  pCO2: 29    /  pO2: 109   / HCO3: 21    / Base Excess: -2.1  /  SaO2: 98.8              Blood Gas Arterial, Lactate: 0.9 mmol/L (23 @ 18:07)  Blood Gas Arterial, Lactate: 1.0 mmol/L (23 @ 12:16)  Blood Gas Arterial, Lactate: 0.9 mmol/L (23 @ 09:50)  Blood Gas Arterial, Lactate: 0.9 mmol/L (23 @ 01:47)    Urinalysis Basic - ( 2023 01:49 )    Color: x / Appearance: x / SG: x / pH: x  Gluc: 105 mg/dL / Ketone: x  / Bili: x / Urobili: x   Blood: x / Protein: x / Nitrite: x   Leuk Esterase: x / RBC: x / WBC x   Sq Epi: x / Non Sq Epi: x / Bacteria: x      ======================Micro/Rad/Cardio=================  Telemtry: Reviewed   EKG: Reviewed  CXR: Reviewed  Culture: Reviewed   Echo:   Cath: Cardiac Cath Lab - Adult:   [facility]  Department of Cardiology  [address] [place] [address]  [phone]  Cath Lab Report -- Comprehensive Report  Patient: LD VALENCIA  Study date: 2017  Account number: [id number]  MR number: [id number]  : 1965  Gender: Male  Race: W  Case Physician(s):  Jairon Holguin M.D.  Referring Physician:  Luc Lynn M.D.  INDICATIONS: Unstable angina - CCS4.  HISTORY: The patient has a history of coronary artery disease. The patient  hashypertension and medication-treated dyslipidemia.  PROCEDURE:  --  Left heart catheterization with ventriculography.  --  Left coronary angiography.  --  Right coronary angiography.  TECHNIQUE: The risks and alternatives of the procedures and conscious  sedation were explained to the patient and informed consent was obtained.  Cardiac catheterization performed electively.  Local anesthetic given. Right radial artery access. A 6FR PRELUDE KIT was  inserted in the vessel. Left heart catheterization. Ventriculography was  performed. A 5FR FR4.0 EXPO catheter was utilized. Left coronary artery  angiography. The vessel was injected utilizing a 5FR FL3.5 EXPO catheter.  Right coronary artery angiography. The vessel was injected utilizing a 5FR  FR4.0 EXPO catheter. RADIATION EXPOSURE: 1.1 min.  CONTRAST GIVEN: Omnipaque 55 ml.  MEDICATIONS GIVEN: Midazolam, 1 mg, IV. Fentanyl, 25 mcg, IV. Verapamil  (Isoptin, Calan, Covera), 2.5 mg, IA. Heparin, 3000 units, IA.  VENTRICLES: Global left ventricular function was moderately depressed. EF  estimated was 40 %.  CORONARY VESSELS: The coronary circulation is right dominant.  LM:   --  LM: Normal.  LAD:   --  Proximal LAD: There was a 50 % stenosis.  CX:   --  Circumflex: Normal.  RCA:   --  Mid RCA: There was a 40 % stenosis.  --  Distal RCA: There was a 50 % stenosis.  COMPLICATIONS: There were no complications.  DIAGNOSTIC RECOMMENDATIONS: The patient should continue with the present  medications.  Prepared and signed by  Jairon Holguin M.D.  Signed 2017 12:20:13  HEMODYNAMIC TABLES  Pressures:  Baseline/ Room Air  Pressures:  - HR: 78  Pressures:  - Rhythm:  Pressures:  -- Aortic Pressure (S/D/M): --/--/99  Pressures:  -- Left Ventricle (s/edp): 157/39/--  Outputs:  Baseline/ Room Air  Outputs:  -- CALCULATIONS[age]  Outputs:  -- CALCULATIONS: Body Surface Area: 2.05  Outputs:  -- CALCULATIONS: Height in cm: 175.00  Outputs:  -- CALCULATIONS: Sex: Male  Outputs:  -- CALCULATIONS: Weight in k.40 (17 @ 21:55)    ======================================================  PAST MEDICAL & SURGICAL HISTORY:  HTN (hypertension)      CAD (coronary artery disease)  ; stent      Intracranial hemorrhage        Respiratory arrest        Myocardial infarction, unspecified MI type, unspecified artery      History of coronary artery stent placement        ====================ASSESSMENT ==============            Plan:  ====================CARDIOVASCULAR==================  #Cardiogenic Shock requiring IABP (-) s/p unknown shockable event due to ischemia?  -  LHC: pLAD 100 % in-stent restenosis & mRCA, 100 %. PCWP 30. +IABP.  -  TTE: LV dilated. EF 32 %. Regional WMAs present, mod (grade 2) LV diastolic dysfunction  -per echo areas of akinesis are not new compared to 2017, but EF is severely decreased   -currently on IABP 1:1, with augmentation of 130s, Mean 120s  -wean off levo  -daily CXR for IABP  -Keep right leg straight while sheaths/IABP/[place] are in position       #STEMI at OSH  -Trop 440-> 414  -EKG here w/ LBBB  -[facility] : pLAD 70 % stenosis in the ostium portion of the segment and 100 % in-stent restenosis. mRCA, 100 % stenosis. RA 23, PA 51/33, wedge 31, PA sat 54%, CI 1.5  -s/p IABP, weaned overnight 1:2 CI 3.3 1:3 CI 4.8. Planned for d/c in the AM   -c/w ASA, brillinta (takes plavix at home?)  -c/w lipitor 80  -c/w heparin gtt, d/c in the AM for IABP removal   -TTE : severely reduced EF, no new areas of akinesis  -CT surg not recommending CABG, instead AT eval  -consider viability study    #HFrEF w/ severely reduced EF 32%  TTE : LV moderately dilated. EF 32 %. Regional wall motion abnormalities present, mod (grade 2) LV diastolic dysfunction, with indeterminant filling pressure.   TTE : LV thrombus, EF of 22% w/ global hypokinesis  -s/p lasix 40  -continue management of shock as above  -start GDMT when stable  - Pt appears clinically hypervolemic, likely cause of SoB  - BNP 4168 on admission  - CXR  clear   - S/p lasix 40 on admission  -presently euvolemic w/ CVP 6  -started on Nipride gtt for hypertension, wean as tolerated   - Strict I&O and Daily weights  - Fluid restriction    #LV thrombus  TTE : LV thrombus, EF of 22% w/ global hypokinesis  -c/w heparin gtt    #HTN  -hold home losartan 25, HCTZ 25, coreg 25 bid for now  nitroprusside Infusion 0.5 MICROgram(s)/kG/Min (7.42 mL/Hr) IV Continuous <Continuous>      ====================== NEUROLOGY=====================  Anxiety  -takes PRN lorazepam 0.5 at home  -resume as needed, pt indicates feeling anxious  -A+Ox3 post extubation     ==================== RESPIRATORY======================  Acute respiratory failure   -s/p extubation today, tolerating well   -transitioned to NC, saturating >94%   Mechanical Ventilation:  Mode: AC/ CMV (Assist Control/ Continuous Mandatory Ventilation)  RR (machine): 16  TV (machine): 500  FiO2: 40  PEEP: 5  ITime: 1  MAP: 11  PIP: 28    ABG - ( 2023 18:07 )  pH, Arterial: 7.46  pH, Blood: x     /  pCO2: 29    /  pO2: 109   / HCO3: 21    / Base Excess: -2.1  /  SaO2: 98.8                albuterol    0.083% 2.5 milliGRAM(s) Nebulizer daily  budesonide  80 MICROgram(s)/formoterol 4.5 MICROgram(s) Inhaler 2 Puff(s) Inhalation two times a day  tiotropium 2.5 MICROgram(s) Inhaler 2 Puff(s) Inhalation daily    ===================== RENAL =========================  Normal Scr   -no active issues     23 @ 07:  -  23 @ 07:00  --------------------------------------------------------  IN: 1093.2 mL / OUT: 1941 mL / NET: -847.8 mL    23 @ 07:01  -  23 @ 22:04  --------------------------------------------------------  IN: 874.7 mL / OUT: 790 mL / NET: 84.7 mL      Renal Replacement Therapy:  [ ] CRRT      [ ] IHD, Last Session:    Fluid removal:     [ ] Diuretic therapy, Regimen:       ==================== GASTROINTESTINAL===================  Resumed PO diet   -tolerating well     Last BM:   Indication for Stress Ulcer Prophylaxis, [ ] Yes    [ ] No   If Yes, Medication:     pantoprazole    Tablet 40 milliGRAM(s) Oral before breakfast  polyethylene glycol 3350 17 Gram(s) Oral daily  senna 2 Tablet(s) Oral at bedtime    ========================INFECTIOUS DISEASE================  #Concern for aspiration event prior to intubation  R sided opacification on CXR   -Possibly iso aspiration event prior to intubation  -start empiric coverage with vanc and cefepime  -ID recommends to d/c antibiotics  -no concern to start remdesivir or steroids for COVID  -MRSA swab  -sputum cx    T(C): 37.7 (23 @ 20:00), Max: 37.9 (23 @ 16:00)  WBC Count: 6.46 K/uL (23 @ 01:49)  WBC Count: 7.56 K/uL (23 @ 05:34)  WBC Count: 6.53 K/uL (23 @ 22:22)  WBC Count: 8.24 K/uL (23 @ 06:14)      Culture - Sputum (collected 23 @ 15:17)  Source: ET Tube ET Tube  Gram Stain (23 @ 06:50):    Rare polymorphonuclear leukocytes per low power field    No Squamous epithelial cells per low power field    No organisms seen per oil power field  Preliminary Report (23 @ 16:12):    No growth to date.    Culture - Blood (collected 23 @ 14:46)  Source: .Blood Blood  Preliminary Report (23 @ 19:02):    No growth at 24 hours    Culture - Blood (collected 23 @ 14:34)  Source: .Blood Blood  Preliminary Report (23 @ 19:02):    No growth at 24 hours        Current Antibiotics with start date:       ===================HEMATOLOGIC/ONC ===================  Stable H/H  -no active issues     Hemoglobin: 13.2 g/dL (23 @ 01:49)  Hemoglobin: 13.8 g/dL (23 @ 05:34)  Hemoglobin: 14.2 g/dL (23 @ 22:22)  Hemoglobin: 14.7 g/dL (23 @ 06:14)    Platelet Count - Automated: 136 K/uL (23 @ 01:49)  Platelet Count - Automated: 134 K/uL (23 @ 05:34)  Platelet Count - Automated: 148 K/uL (23 @ 22:22)  Platelet Count - Automated: 173 K/uL (23 @ 06:14)    Chemical VTE Prophylaxis:  [ ] Lovenox    [ ] SQH   [ ]NA  Systemic Anticogaulation:  [ ] Yes    [ ] No,  If Yes, Medication:     aspirin  chewable 81 milliGRAM(s) Oral daily  heparin  Infusion 950 Unit(s)/Hr (9.5 mL/Hr) IV Continuous <Continuous>  ticagrelor 90 milliGRAM(s) Oral every 12 hours    =======================    ENDOCRINE  =====================  Hgb A1C on admission 8.3%  -c/w lantus at bedtime  -c/w ISS pre meal and at bedtime     POCT Blood Glucose.: 109 mg/dL (23 @ 21:44)  POCT Blood Glucose.: 91 mg/dL (23 @ 05:43)  POCT Blood Glucose.: 114 mg/dL (23 @ 23:10)        atorvastatin 80 milliGRAM(s) Oral at bedtime  dextrose 50% Injectable 12.5 Gram(s) IV Push once  dextrose 50% Injectable 25 Gram(s) IV Push once  dextrose 50% Injectable 25 Gram(s) IV Push once  dextrose Oral Gel 15 Gram(s) Oral once PRN Blood Glucose LESS THAN 70 milliGRAM(s)/deciliter  glucagon  Injectable 1 milliGRAM(s) IntraMuscular once  insulin glargine Injectable (LANTUS) 6 Unit(s) SubCutaneous at bedtime  insulin lispro (ADMELOG) corrective regimen sliding scale   SubCutaneous three times a day before meals  insulin lispro (ADMELOG) corrective regimen sliding scale   SubCutaneous at bedtime      Patient requires continuous monitoring with bedside rhythm monitoring, pulse ox monitoring, and intermittent blood gas analysis. Care plan discussed with ICU care team. Patient remained critical and at risk for life threatening decompensation.  Patient seen, examined and plan discussed with CCU team during rounds.       I have personally provided __30__ minutes of critical care time excluding time spent on separate procedures, in addition to initial critical care time provided by the CICU Attending, Dr. Cunningham.

## 2023-11-03 NOTE — DIETITIAN INITIAL EVALUATION ADULT - ADD RECOMMEND
- Consider multivitamin if not otherwise contraindicated.  - Monitor GI tolerance to feeds, RD remains available to adjust TF regimen/formulary as needed/upon request.   - Continue to monitor nutritional intake, labs, weights, BM, skin, clinical course.

## 2023-11-03 NOTE — PROGRESS NOTE ADULT - ASSESSMENT
57 yo M with HTN, CAD (s/p PCI 2008), HFrEF (EF severely reduced 2018) not on GDMT, CVA 2018 (requiring tpA), DM presenting with chest pressure and unknown tachycardia that was shocked x1, [facility] 11/1 found to have chronic total occlusion of LAD and RCA, with elevated RA and PA pressures, echo 11/1 w/ severely decreased EF 32%, s/p IABP 11/1, likely in cardiogenic shock 2/2 acute on chronic HF s/p unknown shockable rhythm.    NEUROLOGY  Sedated on propofol  - wean propofol as tolerated for SBT  - continue to monitor mental status as per protocol     RESPIRATORY  Intubated for AHRF prior to [facility]  - vent settings: 500/16/5/50%  - wean FiO2 as tolerated, presently requiring the vent  - trend ABGs  - continue to monitor SpO2 with goal >94%  - COVID +  -empirically cover with vanc and cefepime (11/2-  -MRSA swab    #R sided opacification on CXR 11/2  -Possibly iso aspiration event prior to intubation  -start empiric coverage with vanc and cefepime (11/2-  -MRSA swab  -sputum cx    #History of PNA  Pt initially had cough, congestion, fever, was placed on antibiotics on Juan 10/29  -CXR here clear  -s/p levaquin (10/29-10/31)  -if afebrile, remains w/o leukocytosis       #Asthma (hx of respiratory failure in 2018- intubated for 20 minutes per chart review pt report)  -restart montelukast and albuterol qd  -resume home trelegy when extubated    ====================CARDIOVASCULAR==================    #Cardiogenic Shock requiring IABP (11/1-) s/p unknown shockable event due to ischemia?  - 11/1 [facility]: pLAD 100 % in-stent restenosis & mRCA, 100 %. PCWP 30. +IABP.  - 11/1 TTE: LV dilated. EF 32 %. Regional WMAs present, mod (grade 2) LV diastolic dysfunction  -per echo areas of akinesis are not new compared to 2017, but EF is severely decreased   -currently on IABP 1:1, with augmentation of 130s, Mean 120s  -wean off levo  -daily CXR for IABP  -Keep right leg straight while sheaths/IABP/[place] are in position       #STEMI at OSH  -Trop 440-> 414  -EKG here w/ LBBB  -LHC 11/1: pLAD 70 % stenosis in the ostium portion of the segment and 100 % in-stent restenosis. mRCA, 100 % stenosis. RA 23, PA 51/33, wedge 31, PA sat 54%, CI 1.5  -s/p IABP  -c/w ASA, brillinta (takes plavix at home?)  -c/w lipitor 80  -c/w heparin gtt  -TTE 11/1: severely reduced EF, no new areas of akinesis  -CT surg not recommending CABG, instead AT eval  -consider viability study    #HFrEF w/ severely reduced EF 32%  TTE 11/1: LV moderately dilated. EF 32 %. Regional wall motion abnormalities present, mod (grade 2) LV diastolic dysfunction, with indeterminant filling pressure.   TTE 11/2: LV thrombus, EF of 22% w/ global hypokinesis  -s/p lasix 40  -continue management of shock as above  -start GDMT when stable  - Pt appears clinically hypervolemic, likely cause of SoB  - BNP 4168 on admission  - CXR 11/1 clear   - tele  - S/p lasix 40 on admission  -presently euvolemic w/ CVP 6  - Strict I&O  - Daily weights  - Fluid restriction  - on ozempic and dapaglifozin at home    #LV thrombus  TTE 11/2: LV thrombus, EF of 22% w/ global hypokinesis  -c/w heparin gtt    #HTN  -hold home losartan 25, HCTZ 25, coreg 25 bid for now    RENAL:  Baseline Cr: 1-1.22  -trend Cr    #Metabolic acidosis  Likely 2/2 lactate of 3.7, due to cardiogenic shock  Improved  -trend lactate, blood gas  -IABP     GI:  #Transaminitis  Likely iso cardiogenic shock/abnormal shockable event  AST/ALT: 94/50 on admission  -trend LFTs    #Diet: NPO    #Bowel regimen:     ENDO:  #Type 2 DM  A1c 8.3  -TSH    METABOLIC:  -no active issues    HEMATOLOGIC:  H/H stable    #DVT prophylaxis with heparin    ID:  #Concern for aspiration event prior to intubation  R sided opacification on CXR 11/2  -Possibly iso aspiration event prior to intubation  -start empiric coverage with vanc and cefepime (11/2-  -MRSA swab  -sputum cx    #History of PNA  Pt initially had cough, congestion, fever, was placed on antibiotics on [facility] 10/29  -CXR 11/1 clear  -s/p levaquin (10/29-10/31)    SKIN:  #Lines: IABP (11/1-, [place] (11/1-     59 yo M with HTN, CAD (s/p PCI 2008), HFrEF (EF severely reduced 2018) not on GDMT, CVA 2018 (requiring tpA), DM presenting with chest pressure and unknown tachycardia that was shocked x1, C 11/1 found to have chronic total occlusion of LAD and RCA, with elevated RA and PA pressures, echo 11/1 w/ severely decreased EF 32%, s/p IABP 11/1, likely in cardiogenic shock 2/2 acute on chronic HF s/p unknown shockable rhythm.    NEUROLOGY  A&Ox3, s/p extubation 11/3    Hx of CVA requiring TPA c/b ICH  -no neuro deficits known    RESPIRATORY  Intubated for AHRF prior to [facility], extubated 11/3  - wean O2 as tolerated, currently on NC  - trend ABGs  - continue to monitor SpO2 with goal >94%  - COVID +  -watch off abx; vanc and cefepime (11/2)  -MRSA swab neg    #R sided opacification on CXR 11/2  -improved today 11/3  -Possibly iso aspiration event prior to intubation  -watch off abx; vanc and cefepime (11/2)  -MRSA swab neg  -sputum cx 11/2 no growth    #History of PNA  Pt initially had cough, congestion, fever, was placed on antibiotics on [facility] 10/29  -CXR here clear  -s/p levaquin (10/29-10/31)  -if afebrile, remains w/o leukocytosis       #Asthma (hx of respiratory failure in 2018- intubated for 20 minutes per chart review pt report)  -c/w montelukast and albuterol qd  -resume home trelegy     CARDIOVASCULAR    #Cardiogenic Shock requiring IABP (11/1-) s/p unknown shockable event due to ischemia?  - 11/1 [facility]: pLAD 100 % in-stent restenosis & mRCA, 100 %. PCWP 30. +IABP.  - 11/1 TTE: LV dilated. EF 32 %. Regional WMAs present, mod (grade 2) LV diastolic dysfunction  -per echo areas of akinesis are not new compared to 2017, but EF is severely decreased   -currently on IABP 1:1, with augmentation of 130s, Mean 120s  -start nipride gtt for weaning of IABP  -wean off levo  -daily CXR for IABP  -Keep right leg straight while sheaths/IABP/[place] are in position       #STEMI at OSH  -Trop 440-> 414  -EKG here w/ LBBB  -LHC 11/1: pLAD 70 % stenosis in the ostium portion of the segment and 100 % in-stent restenosis. mRCA, 100 % stenosis. RA 23, PA 51/33, wedge 31, PA sat 54%, CI 1.5  -s/p IABP  -c/w ASA, brillinta (takes plavix at home?)  -c/w lipitor 80  -c/w heparin gtt  -TTE 11/1: severely reduced EF, no new areas of akinesis  -CT surg not recommending CABG, instead AT eval  -viability study when IABP removed    #HFrEF w/ severely reduced EF 32%  TTE 11/1: LV moderately dilated. EF 32 %. Regional wall motion abnormalities present, mod (grade 2) LV diastolic dysfunction, with indeterminant filling pressure.   TTE 11/2: LV thrombus, EF of 22% w/ global hypokinesis  -s/p lasix 40  -continue management of shock as above  -start GDMT when stable  - BNP 4168 on admission  - CXR 11/1 clear   - tele  - S/p lasix 40 on admission  -presently euvolemic w/ CVP 6  - Strict I&O  - Daily weights  - Fluid restriction  - on ozempic and dapaglifozin at home    #LV thrombus  TTE 11/2: LV thrombus, EF of 22% w/ global hypokinesis  -c/w heparin gtt    #HTN  -hold home losartan 25, HCTZ 25, coreg 25 bid for now    RENAL:  Baseline Cr: 1-1.22  -trend Cr    #Metabolic acidosis  Likely 2/2 lactate of 3.7, due to cardiogenic shock  Improved  -trend lactate, blood gas  -IABP     GI:  #Transaminitis  Likely iso cardiogenic shock/abnormal shockable event  AST/ALT: 94/50 on admission  -trend LFTs    #Diet:   -passed dysphagia screen  -start pureed diet    #Bowel regimen:   -miralax  -senna    ENDO:  #Type 2 DM  A1c 8.3  -TSH 0.50    METABOLIC:  -no active issues    HEMATOLOGIC:  H/H stable    #DVT prophylaxis with heparin    ID:  #Concern for aspiration event prior to intubation  R sided opacification on CXR 11/2, clear today 11/3  -Possibly iso aspiration event prior to intubation  -monitor off vanc and cefepime (11/2)  -MRSA swab  -sputum cx    #History of PNA  Pt initially had cough, congestion, fever, was placed on antibiotics on [facility] 10/29  -CXR 11/1 clear  -s/p levaquin (10/29-10/31)    SKIN:  #Lines: IABP (11/1-, [place] (11/1-     57 yo M with HTN, CAD (s/p PCI 2008), HFrEF (EF severely reduced 2018) not on GDMT, CVA 2018 (requiring tpA), DM presenting with chest pressure and unknown tachycardia that was shocked x1, C 11/1 found to have chronic total occlusion of LAD and RCA, with elevated RA and PA pressures, echo 11/1 w/ severely decreased EF 32%, s/p IABP 11/1, likely in cardiogenic shock 2/2 acute on chronic HF s/p unknown shockable rhythm.    NEUROLOGY  A&Ox3, s/p extubation 11/3    Hx of CVA requiring TPA c/b ICH  -no neuro deficits known    RESPIRATORY  Intubated for AHRF prior to [facility], extubated 11/3  - wean O2 as tolerated, currently on NC  - trend ABGs  - continue to monitor SpO2 with goal >94%  - COVID +  -watch off abx; vanc and cefepime (11/2)  -MRSA swab neg    #R sided opacification on CXR 11/2  -improved today 11/3  -Possibly iso aspiration event prior to intubation  -watch off abx; vanc and cefepime (11/2)  -MRSA swab neg  -sputum cx 11/2 no growth    #History of PNA  Pt initially had cough, congestion, fever, was placed on antibiotics on [facility] 10/29  -CXR here clear  -s/p levaquin (10/29-10/31)  -if afebrile, remains w/o leukocytosis       #Asthma (hx of respiratory failure in 2018- intubated for 20 minutes per chart review pt report)  -c/w albuterol qd  -c/ed montelukast- does not take at home  -resume home tregy     CARDIOVASCULAR    #Cardiogenic Shock requiring IABP (11/1-) s/p unknown shockable event due to ischemia?  - 11/1 [facility]: pLAD 100 % in-stent restenosis & mRCA, 100 %. PCWP 30. +IABP.  - 11/1 TTE: LV dilated. EF 32 %. Regional WMAs present, mod (grade 2) LV diastolic dysfunction  -per echo areas of akinesis are not new compared to 2017, but EF is severely decreased   -currently on IABP 1:1, with augmentation of 130s, Mean 120s  -start nipride gtt for weaning of IABP  -wean off levo  -daily CXR for IABP  -Keep right leg straight while sheaths/IABP/[place] are in position       #STEMI at OSH  -Trop 440-> 414  -EKG here w/ LBBB  -LHC 11/1: pLAD 70 % stenosis in the ostium portion of the segment and 100 % in-stent restenosis. mRCA, 100 % stenosis. RA 23, PA 51/33, wedge 31, PA sat 54%, CI 1.5  -s/p IABP  -c/w ASA, brillinta (takes plavix at home?)  -c/w lipitor 80  -c/w heparin gtt  -TTE 11/1: severely reduced EF, no new areas of akinesis  -CT surg not recommending CABG, instead AT eval  -viability study when IABP removed    #HFrEF w/ severely reduced EF 32%  TTE 11/1: LV moderately dilated. EF 32 %. Regional wall motion abnormalities present, mod (grade 2) LV diastolic dysfunction, with indeterminant filling pressure.   TTE 11/2: LV thrombus, EF of 22% w/ global hypokinesis  -s/p lasix 40  -continue management of shock as above  -start GDMT when stable  - BNP 4168 on admission  - CXR 11/1 clear   - tele  - S/p lasix 40 on admission  -presently euvolemic w/ CVP 6  - Strict I&O  - Daily weights  - Fluid restriction  - on ozempic and dapaglifozin at home    #LV thrombus  TTE 11/2: LV thrombus, EF of 22% w/ global hypokinesis  -c/w heparin gtt    #HTN  -hold home losartan 25, HCTZ 25, coreg 25 bid for now    RENAL:  Baseline Cr: 1-1.22  -trend Cr    #Metabolic acidosis  Likely 2/2 lactate of 3.7, due to cardiogenic shock  Improved  -trend lactate, blood gas  -IABP     GI:  #Transaminitis  Likely iso cardiogenic shock/abnormal shockable event  AST/ALT: 94/50 on admission  -trend LFTs    #Diet:   -passed dysphagia screen  -start pureed diet    GERD  -changed PPI to qd    #Bowel regimen:   -miralax  -senna    ENDO:  #Type 2 DM  A1c 8.3  -TSH 0.50    METABOLIC:  -no active issues    HEMATOLOGIC:  H/H stable    #DVT prophylaxis with heparin    ID:  #Concern for aspiration event prior to intubation  R sided opacification on CXR 11/2, clear today 11/3  -Possibly iso aspiration event prior to intubation  -monitor off vanc and cefepime (11/2)  -MRSA swab  -sputum cx    #History of PNA  Pt initially had cough, congestion, fever, was placed on antibiotics on [facility] 10/29  -CXR 11/1 clear  -s/p levaquin (10/29-10/31)    SKIN:  #Lines: IABP (11/1-, [place] (11/1-

## 2023-11-03 NOTE — PROGRESS NOTE ADULT - SUBJECTIVE AND OBJECTIVE BOX
[facility]  Division of Infectious Diseases  [phone]    Name: LD VALENCIA  Age: 58y  Gender: Male  MRN: [id number]    Interval History--  Notes reviewed.     Past Medical History--  HTN (hypertension)    CAD (coronary artery disease)    Intracranial hemorrhage    Respiratory arrest    Myocardial infarction, unspecified MI type, unspecified artery    History of coronary artery stent placement        For details regarding the patient's social history, family history, and other miscellaneous elements, please refer the initial infectious diseases consultation and/or the admitting history and physical examination for this admission.    Allergies    penicillins (Unknown)    Intolerances        Medications--  Antibiotics:    Immunologic:    Other:  albuterol    0.083%  aspirin  chewable  atorvastatin  chlorhexidine 0.12% Liquid  chlorhexidine 2% Cloths  dexMEDEtomidine Infusion  dextrose 50% Injectable  dextrose 50% Injectable  dextrose 50% Injectable  dextrose Oral Gel PRN  glucagon  Injectable  heparin  Infusion  insulin glargine Injectable (LANTUS)  insulin lispro (ADMELOG) corrective regimen sliding scale  montelukast  pantoprazole  Injectable  ticagrelor      Review of Systems--  A 10-point review of systems was obtained.     Pertinent positives and negatives--  Constitutional: No fevers. No Chills. No Rigors.   Cardiovascular: No chest pain. No palpitations.  Respiratory: No shortness of breath. No cough.  Gastrointestinal: No nausea or vomiting. No diarrhea or constipation.   Psychiatric: Pleasant. Appropriate affect.    Review of systems otherwise negative except as previously noted.    Physical Examination--  Vital Signs: T(F): 98.8 (11-03-23 @ 07:00), Max: 98.8 (11-03-23 @ 03:00)  HR: 83 (11-03-23 @ 08:40)  BP: --  RR: 18 (11-03-23 @ 08:40)  SpO2: 97% (11-03-23 @ 08:40)  Wt(kg): --  General: Nontoxic-appearing Male in no acute distress.  HEENT: AT/NC. PERRL. EOMI. Anicteric. Conjunctiva pink and moist. Oropharynx clear. Dentition fair.  Neck: Not rigid. No sense of mass.  Nodes: None palpable.  Lungs: Clear bilaterally without rales, wheezing or rhonchi  Heart: Regular rate and rhythm. No Murmur. No rub. No gallop. No palpable thrill.  Abdomen: Bowel sounds present and normoactive. Soft. Nondistended. Nontender. No sense of mass. No organomegaly.  Back: No spinal tenderness. No costovertebral angle tenderness.   Extremities: No cyanosis or clubbing. No edema.   Skin: Warm. Dry. Good turgor. No rash. No vasculitic stigmata.  Psychiatric: Appropriate affect and mood for situation.         Laboratory Studies--  CBC                        13.2   6.46  )-----------( 136      ( 03 Nov 2023 01:49 )             39.4       Chemistries  11-03    139  |  109<H>  |  14  ----------------------------<  105<H>  3.9   |  20<L>  |  1.02    Ca    8.5      03 Nov 2023 01:49  Phos  2.4     11-03  Mg     2.2     11-03    TPro  5.8<L>  /  Alb  2.9<L>  /  TBili  0.4  /  DBili  x   /  AST  70<H>  /  ALT  53<H>  /  AlkPhos  42  11-03      Culture Data    Culture - Sputum (collected 02 Nov 2023 15:17)  Source: ET Tube ET Tube  Gram Stain (03 Nov 2023 06:50):    Rare polymorphonuclear leukocytes per low power field    No Squamous epithelial cells per low power field    No organisms seen per oil power field             [facility]  Division of Infectious Diseases  [phone]    Name: LD VALENCIA  Age: 58y  Gender: Male  MRN: [id number]    Interval History--  Notes reviewed. Sedated on vent.  D/W CCU fellow, off abx      Past Medical History--  HTN (hypertension)    CAD (coronary artery disease)    Intracranial hemorrhage    Respiratory arrest    Myocardial infarction, unspecified MI type, unspecified artery    History of coronary artery stent placement        For details regarding the patient's social history, family history, and other miscellaneous elements, please refer the initial infectious diseases consultation and/or the admitting history and physical examination for this admission.    Allergies    penicillins (Unknown)    Intolerances        Medications--  Antibiotics:    Immunologic:    Other:  albuterol    0.083%  aspirin  chewable  atorvastatin  chlorhexidine 0.12% Liquid  chlorhexidine 2% Cloths  dexMEDEtomidine Infusion  dextrose 50% Injectable  dextrose 50% Injectable  dextrose 50% Injectable  dextrose Oral Gel PRN  glucagon  Injectable  heparin  Infusion  insulin glargine Injectable (LANTUS)  insulin lispro (ADMELOG) corrective regimen sliding scale  montelukast  pantoprazole  Injectable  ticagrelor      Review of Systems--  Review of systems unable secondary to clinical condition.      Physical Examination--  Vital Signs: T(F): 98.8 (11-03-23 @ 07:00), Max: 98.8 (11-03-23 @ 03:00)  HR: 83 (11-03-23 @ 08:40)  BP: --  RR: 18 (11-03-23 @ 08:40)  SpO2: 97% (11-03-23 @ 08:40)  Wt(kg): --  General: Nontoxic-appearing Male in no acute distress.  HEENT: AT/NC. OETT. Anicteric. Conjunctiva pink and moist. Oropharynx/dentition unable secondary to patient compliance.   Neck: Not rigid. No sense of mass.  Nodes: None palpable.  Lungs: Coarse B BS  Heart: Grossly RRR  Abdomen: Bowel sounds present and normoactive. Soft. Nondistended. Nontender.  Back: Unable  Extremities: No cyanosis or clubbing. 1+ edema. IABP SGC R groin  Skin: Warm. Dry. Good turgor. No rash. No vasculitic stigmata.  Psychiatric: Unable        Laboratory Studies--  CBC                        13.2   6.46  )-----------( 136      ( 03 Nov 2023 01:49 )             39.4       Chemistries  11-03    139  |  109<H>  |  14  ----------------------------<  105<H>  3.9   |  20<L>  |  1.02    Ca    8.5      03 Nov 2023 01:49  Phos  2.4     11-03  Mg     2.2     11-03    TPro  5.8<L>  /  Alb  2.9<L>  /  TBili  0.4  /  DBili  x   /  AST  70<H>  /  ALT  53<H>  /  AlkPhos  42  11-03      Culture Data    Culture - Sputum (collected 02 Nov 2023 15:17)  Source: ET Tube ET Tube  Gram Stain (03 Nov 2023 06:50):    Rare polymorphonuclear leukocytes per low power field    No Squamous epithelial cells per low power field    No organisms seen per oil power field

## 2023-11-03 NOTE — PROGRESS NOTE ADULT - SUBJECTIVE AND OBJECTIVE BOX
PATIENT:  LD VALENCIA  [id number]    CHIEF COMPLAINT:  Patient is a 58y old  Male who presents with a chief complaint of NSTEMI (2023 20:29)      INTERVAL HISTORY/OVERNIGHT EVENTS: Overnight, precedex was added and pt became hypotensive requiring levo.    Patient seen and examined at bedside. Sedated, does not respond to voice or pain. Appears comfortable.    MEDICATIONS:  MEDICATIONS  (STANDING):  aspirin  chewable 81 milliGRAM(s) Oral daily  atorvastatin 80 milliGRAM(s) Oral at bedtime  chlorhexidine 0.12% Liquid 15 milliLiter(s) Oral Mucosa every 12 hours  dexMEDEtomidine Infusion 0.5 MICROgram(s)/kG/Hr (11.2 mL/Hr) IV Continuous <Continuous>  dextrose 5%. 1000 milliLiter(s) (100 mL/Hr) IV Continuous <Continuous>  dextrose 5%. 1000 milliLiter(s) (50 mL/Hr) IV Continuous <Continuous>  dextrose 50% Injectable 25 Gram(s) IV Push once  dextrose 50% Injectable 25 Gram(s) IV Push once  dextrose 50% Injectable 12.5 Gram(s) IV Push once  glucagon  Injectable 1 milliGRAM(s) IntraMuscular once  heparin  Infusion 1000 Unit(s)/Hr (12 mL/Hr) IV Continuous <Continuous>  insulin glargine Injectable (LANTUS) 13 Unit(s) SubCutaneous at bedtime  insulin lispro (ADMELOG) corrective regimen sliding scale   SubCutaneous every 6 hours  levoFLOXacin  Tablet 500 milliGRAM(s) Oral every 24 hours  norepinephrine Infusion 0.05 MICROgram(s)/kG/Min (9.27 mL/Hr) IV Continuous <Continuous>  pantoprazole  Injectable 40 milliGRAM(s) IV Push every 12 hours  propofol Infusion 40 MICROgram(s)/kG/Min (21.5 mL/Hr) IV Continuous <Continuous>  ticagrelor 90 milliGRAM(s) Oral every 12 hours    MEDICATIONS  (PRN):  dextrose Oral Gel 15 Gram(s) Oral once PRN Blood Glucose LESS THAN 70 milliGRAM(s)/deciliter      ALLERGIES:  Allergies    penicillins (Unknown)    Intolerances        OBJECTIVE:  ICU Vital Signs Last 24 Hrs  T(C): 36.6 (2023 04:00), Max: 36.8 (2023 10:00)  T(F): 97.9 (2023 04:00), Max: 98.2 (2023 10:00)  HR: 74 (2023 07:00) (69 - 88)  BP: 127/82 (2023 12:26) (115/95 - 127/82)  BP(mean): 100 (2023 12:26) (100 - 101)  ABP: 109/76 (2023 07:00) (78/55 - 130/96)  ABP(mean): 96 (2023 07:00) (66 - 113)  RR: 16 (2023 07:00) (7 - 41)  SpO2: 97% (2023 07:00) (94% - 100%)    O2 Parameters below as of 2023 04:00  Patient On (Oxygen Delivery Method): ventilator  O2 Flow (L/min): 30        Mode: AC/ CMV (Assist Control/ Continuous Mandatory Ventilation)  RR (machine): 16  TV (machine): 500  FiO2: 50  PEEP: 5  ITime: 1  MAP: 9  PIP: 19    Adult Advanced Hemodynamics Last 24 Hrs  CVP(mm Hg): 15 (2023 07:00) (-3 - 26)  CVP(cm H2O): --  CO: --  CI: --  PA: 40/23 (2023 07:00) (22/10 - 49/32)  PA(mean): 30 (2023 07:00) (14 - 38)  PCWP: --  SVR: --  SVRI: --  PVR: --  PVRI: --  CAPILLARY BLOOD GLUCOSE      POCT Blood Glucose.: 104 mg/dL (2023 05:27)    CAPILLARY BLOOD GLUCOSE      POCT Blood Glucose.: 104 mg/dL (2023 05:27)    I&O's Summary    2023 07:01  -  2023 07:00  --------------------------------------------------------  IN: 481.6 mL / OUT: 2920 mL / NET: -2438.4 mL      Daily Height in cm: 175.26 (2023 12:26)    Daily     PHYSICAL EXAMINATION:  General: WN/WD NAD  HEENT: Pinpoint pupils bilaterally. PERRLA, moist mucous membranes  Neurology: A&Ox0, sedated, does not withdraw to pain  Respiratory: CTA B/L, normal respiratory effort, no wheezes, crackles, rales  CV: RRR, S1S2, no murmurs, rubs or gallops  Abdominal: Soft, NT, ND  Extremities: No edema    LABS:  ABG - ( 2023 02:45 )  pH, Arterial: 7.40  pH, Blood: x     /  pCO2: 37    /  pO2: 121   / HCO3: 23    / Base Excess: -1.5  /  SaO2: 99.3                                    13.8   7.56  )-----------( 134      ( 2023 05:34 )             41.3     11    139  |  107  |  21  ----------------------------<  110<H>  4.0   |  18<L>  |  1.09    Ca    8.0<L>      2023 02:48  Phos  3.4     11-  Mg     2.0     11-    TPro  5.9<L>  /  Alb  3.3  /  TBili  0.4  /  DBili  x   /  AST  139<H>  /  ALT  65<H>  /  AlkPhos  42  11-02    LIVER FUNCTIONS - ( 2023 02:48 )  Alb: 3.3 g/dL / Pro: 5.9 g/dL / ALK PHOS: 42 U/L / ALT: 65 U/L / AST: 139 U/L / GGT: x           PT/INR - ( 2023 22:22 )   PT: 12.9 sec;   INR: 1.18 ratio         PTT - ( 2023 05:34 )  PTT:25.9 sec        Urinalysis Basic - ( 2023 03:58 )    Color: Yellow / Appearance: Turbid / S.030 / pH: x  Gluc: x / Ketone: 15 mg/dL  / Bili: Negative / Urobili: 1.0 mg/dL   Blood: x / Protein: 30 mg/dL / Nitrite: Negative   Leuk Esterase: Moderate / RBC: 91 /HPF /  /HPF   Sq Epi: x / Non Sq Epi: 1 /HPF / Bacteria: Occasional /HPF        TELEMETRY:     EKG:     IMAGING:           PATIENT:  LD VALENCIA  [id number]    CHIEF COMPLAINT:  Patient is a 58y old  Male who presents with a chief complaint of NSTEMI (2023 20:29)      INTERVAL HISTORY/OVERNIGHT EVENTS: Overnight, levo and prop d/daniela. Pt extubated this morning.    Patient seen and examined at bedside. Awake, alert, denied chest pain/SOB/palpitations.    MEDICATIONS:  MEDICATIONS  (STANDING):  aspirin  chewable 81 milliGRAM(s) Oral daily  atorvastatin 80 milliGRAM(s) Oral at bedtime  chlorhexidine 0.12% Liquid 15 milliLiter(s) Oral Mucosa every 12 hours  dexMEDEtomidine Infusion 0.5 MICROgram(s)/kG/Hr (11.2 mL/Hr) IV Continuous <Continuous>  dextrose 5%. 1000 milliLiter(s) (100 mL/Hr) IV Continuous <Continuous>  dextrose 5%. 1000 milliLiter(s) (50 mL/Hr) IV Continuous <Continuous>  dextrose 50% Injectable 25 Gram(s) IV Push once  dextrose 50% Injectable 25 Gram(s) IV Push once  dextrose 50% Injectable 12.5 Gram(s) IV Push once  glucagon  Injectable 1 milliGRAM(s) IntraMuscular once  heparin  Infusion 1000 Unit(s)/Hr (12 mL/Hr) IV Continuous <Continuous>  insulin glargine Injectable (LANTUS) 13 Unit(s) SubCutaneous at bedtime  insulin lispro (ADMELOG) corrective regimen sliding scale   SubCutaneous every 6 hours  levoFLOXacin  Tablet 500 milliGRAM(s) Oral every 24 hours  norepinephrine Infusion 0.05 MICROgram(s)/kG/Min (9.27 mL/Hr) IV Continuous <Continuous>  pantoprazole  Injectable 40 milliGRAM(s) IV Push every 12 hours  propofol Infusion 40 MICROgram(s)/kG/Min (21.5 mL/Hr) IV Continuous <Continuous>  ticagrelor 90 milliGRAM(s) Oral every 12 hours    MEDICATIONS  (PRN):  dextrose Oral Gel 15 Gram(s) Oral once PRN Blood Glucose LESS THAN 70 milliGRAM(s)/deciliter      ALLERGIES:  Allergies    penicillins (Unknown)    Intolerances        OBJECTIVE:  ICU Vital Signs Last 24 Hrs  T(C): 37.3 (2023 11:00), Max: 37.3 (2023 11:00)  T(F): 99.1 (2023 11:00), Max: 99.1 (2023 11:00)  HR: 87 (2023 12:45) (72 - 94)  BP: --  BP(mean): --  ABP: 94/66 (2023 12:45) (93/62 - 134/84)  ABP(mean): 81 (2023 12:45) (80 - 114)  RR: 18 (2023 12:45) (12 - 27)  SpO2: 96% (2023 12:45) (92% - 100%)    O2 Parameters below as of 2023 12:00  Patient On (Oxygen Delivery Method): nasal cannula, high flow  O2 Flow (L/min): 30  O2 Concentration (%): 30      Mode: AC/ CMV (Assist Control/ Continuous Mandatory Ventilation)  RR (machine): 16  TV (machine): 500  FiO2: 50  PEEP: 5  ITime: 1  MAP: 9  PIP: 19    Adult Advanced Hemodynamics Last 24 Hrs  CVP(mm Hg): 15 (2023 07:00) (-3 - 26)  CVP(cm H2O): --  CO: --  CI: --  PA: 40/23 (2023 07:00) (22/10 - 49/32)  PA(mean): 30 (2023 07:00) (14 - 38)  PCWP: --  SVR: --  SVRI: --  PVR: --  PVRI: --  CAPILLARY BLOOD GLUCOSE      POCT Blood Glucose.: 104 mg/dL (2023 05:27)    CAPILLARY BLOOD GLUCOSE      POCT Blood Glucose.: 104 mg/dL (2023 05:27)    I&O's Summary    2023 07:01  -  2023 07:00  --------------------------------------------------------  IN: 1093.2 mL / OUT: 1941 mL / NET: -847.8 mL    2023 07:01  -  2023 14:37  --------------------------------------------------------  IN: 129 mL / OUT: 315 mL / NET: -186 mL        Daily Height in cm: 175.26 (2023 12:26)    Daily     PHYSICAL EXAMINATION:  General: WN/WD NAD  HEENT: PERRLA, moist mucous membranes  Neurology: A&Ox3, no focal deficits  Respiratory: CTA B/L, normal respiratory effort, no wheezes, crackles, rales  CV: RRR, S1S2, no murmurs, rubs or gallops  Abdominal: Soft, NT, ND  Extremities: No edema    LABS:    CBC Full  -  ( 2023 01:49 )  WBC Count : 6.46 K/uL  RBC Count : 4.69 M/uL  Hemoglobin : 13.2 g/dL  Hematocrit : 39.4 %  Platelet Count - Automated : 136 K/uL  Mean Cell Volume : 84.0 fl  Mean Cell Hemoglobin : 28.1 pg  Mean Cell Hemoglobin Concentration : 33.5 gm/dL  Auto Neutrophil # : 4.41 K/uL  Auto Lymphocyte # : 1.38 K/uL  Auto Monocyte # : 0.49 K/uL  Auto Eosinophil # : 0.14 K/uL  Auto Basophil # : 0.02 K/uL  Auto Neutrophil % : 68.2 %  Auto Lymphocyte % : 21.4 %  Auto Monocyte % : 7.6 %  Auto Eosinophil % : 2.2 %  Auto Basophil % : 0.3 %        139  |  109<H>  |  14  ----------------------------<  105<H>  3.9   |  20<L>  |  1.02    Ca    8.5      2023 01:49  Phos  2.4     -  Mg     2.2         TPro  5.8<L>  /  Alb  2.9<L>  /  TBili  0.4  /  DBili  x   /  AST  70<H>  /  ALT  53<H>  /  AlkPhos  42          Urinalysis Basic - ( 2023 03:58 )    Color: Yellow / Appearance: Turbid / S.030 / pH: x  Gluc: x / Ketone: 15 mg/dL  / Bili: Negative / Urobili: 1.0 mg/dL   Blood: x / Protein: 30 mg/dL / Nitrite: Negative   Leuk Esterase: Moderate / RBC: 91 /HPF /  /HPF   Sq Epi: x / Non Sq Epi: 1 /HPF / Bacteria: Occasional /HPF        TELEMETRY:     EKG:     IMAGING:

## 2023-11-03 NOTE — DIETITIAN INITIAL EVALUATION ADULT - REASON FOR ADMISSION
Pt is a58 yo M with HTN, CAD (s/p PCI 2008), HFrEF (EF severely reduced 2018) not on GDMT, CVA 2018 (requiring tpA), DM presenting with chest pressure and unknown tachycardia that was shocked x1, [facility] 11/1 found to have chronic total occlusion of LAD and RCA, with elevated RA and PA pressures, echo 11/1 w/ severely decreased EF 32%, s/p IABP 11/1, likely in cardiogenic shock 2/2 acute on chronic HF s/p unknown shockable rhythm.

## 2023-11-03 NOTE — DIETITIAN INITIAL EVALUATION ADULT - PERTINENT LABORATORY DATA
11-03    139  |  109<H>  |  14  ----------------------------<  105<H>  3.9   |  20<L>  |  1.02    Ca    8.5      03 Nov 2023 01:49  Phos  2.4     11-03  Mg     2.2     11-03    TPro  5.8<L>  /  Alb  2.9<L>  /  TBili  0.4  /  DBili  x   /  AST  70<H>  /  ALT  53<H>  /  AlkPhos  42  11-03  POCT Blood Glucose.: 91 mg/dL (11-03-23 @ 05:43)  A1C with Estimated Average Glucose Result: 8.3 % (11-01-23 @ 06:14)

## 2023-11-03 NOTE — DIETITIAN INITIAL EVALUATION ADULT - PERTINENT MEDS FT
MEDICATIONS  (STANDING):  albuterol    0.083% 2.5 milliGRAM(s) Nebulizer daily  aspirin  chewable 81 milliGRAM(s) Oral daily  atorvastatin 80 milliGRAM(s) Oral at bedtime  chlorhexidine 0.12% Liquid 15 milliLiter(s) Oral Mucosa every 12 hours  chlorhexidine 2% Cloths 1 Application(s) Topical daily  dexMEDEtomidine Infusion 0.5 MICROgram(s)/kG/Hr (11.2 mL/Hr) IV Continuous <Continuous>  dextrose 50% Injectable 12.5 Gram(s) IV Push once  dextrose 50% Injectable 25 Gram(s) IV Push once  dextrose 50% Injectable 25 Gram(s) IV Push once  glucagon  Injectable 1 milliGRAM(s) IntraMuscular once  heparin  Infusion 1000 Unit(s)/Hr (9.5 mL/Hr) IV Continuous <Continuous>  insulin glargine Injectable (LANTUS) 6 Unit(s) SubCutaneous at bedtime  insulin lispro (ADMELOG) corrective regimen sliding scale   SubCutaneous every 6 hours  montelukast 10 milliGRAM(s) Oral daily  pantoprazole  Injectable 40 milliGRAM(s) IV Push every 12 hours  ticagrelor 90 milliGRAM(s) Oral every 12 hours    MEDICATIONS  (PRN):  dextrose Oral Gel 15 Gram(s) Oral once PRN Blood Glucose LESS THAN 70 milliGRAM(s)/deciliter

## 2023-11-03 NOTE — PROGRESS NOTE ADULT - ASSESSMENT
57 yo M with HTN, CAD (s/p PCI 2008), HFrEF (EF severely reduced 2018) not on GDMT, CVA 2018 (requiring tpA), DM presenting with c/o SOB for a week before presenting to the hospital. He had been treated for PNA. He was admitted with c/o chest pressure. Per family, patient passed out, EMS was called and reportedly defibrillated the patient. He was taken to [facility] where he was found to have ACS but he left AMA and came to [facility].    On arrival, ECG showed ST depression in lateral leads, no c/o chest pain. Pro-BNP 4k. [facility] 11/1: pLAD 70 % stenosis in the ostium portion of the segment and 100 % in-stent restenosis. mRCA, 100 % stenosis. RA 23, PA 51/33, wedge 31, PA sat 54%, CI 1.5. S/p IABP.     He was diuresed to euvolemia. Today, hemodynamics shows RA 5; PA 27/12/18; CO/CI 5.6/2.6; MAP 90-100s. He was extubated to high flow nasal cannula.       Wean IABP support   Start nitroprusside gtt to transition from IABP   Monitor markers of organ perfusion as IABP gets weaned   Continue DAPT and statin per IC  Plan for CMR for viability prior to possible revascularization.   No obvious contraindication to AT should he decompensate or revascularization not an option and his CO/CI remains poor after removing IABP   Discussed with CCU team

## 2023-11-03 NOTE — PROGRESS NOTE ADULT - ASSESSMENT
59 yo M with PMHx of HTN, CAD w/ 1 stent in 2009, ICH (2008) presented on 11/1 with abn ekg. Patient presented to [facility] where he was found to have STEMI, recommended to get cath however patient did not want to get it there so he left and came here.   EKG here with ST depression in lateral leads and elevation in anterior leads   Prior to [facility] found to be tachycardic, dyspneic, intubated   [facility] 11/1 with chronic total occlusion of LAD and RCA, with elevated RA and PA pressures  TTE 11/1 with severely decreased EF 32%, s/p IABP 11/1  Afebrile   No leukocytosis   Reportedly had an outpatient course of Levofloxacin for pneumonia   Tested + Covid   CXR: clear lungs     Antimicrobials:  Cefepime   Vancomycin     #Acute hypoxic respiratory failure on ventilator likely 2/2 to cardiogenic shock in the setting of STEMI, currently on low Fi02 and no secretions, low concern of pneumonia on CXR   #Covid infection   Respiratory failure, suspected cardiac basis.  Per discussion with critical care team indication for antibiotics was foaming at the mouth and concern of aspiration event around that time.  No clear convincing evidence of aspiration at this juncture.  Chest exam is fairly clear, imaging without clear convincing evidence of pneumonia, FiO2 requirement minimal.  If there is concern about potential pneumonia despite benign appearing chest x-ray, would check POCUS.  However unless less stronger support emerges for antibiotic use, I would discontinue them.  With respect to COVID, the respiratory failure is unrelated to this diagnosis.  I do not think he merits remdesivir or steroids at this juncture.    11/3: No concern for infection other than COVID. Latter incidental at this point in time. I do not think it merits treatment.     Suggestions--  Defer antibiotics  If any ID input is needed over the weekend, please call [phone]. Thanks.    Enrique Arrington MD  Attending Physician  [facility]  Division of Infectious Diseases  [phone]

## 2023-11-04 DIAGNOSIS — U07.1 COVID-19: ICD-10-CM

## 2023-11-04 LAB
ALBUMIN SERPL ELPH-MCNC: 3 G/DL — LOW (ref 3.3–5)
ALBUMIN SERPL ELPH-MCNC: 3 G/DL — LOW (ref 3.3–5)
ALBUMIN SERPL ELPH-MCNC: 3.1 G/DL — LOW (ref 3.3–5)
ALBUMIN SERPL ELPH-MCNC: 3.1 G/DL — LOW (ref 3.3–5)
ALP SERPL-CCNC: 41 U/L — SIGNIFICANT CHANGE UP (ref 40–120)
ALP SERPL-CCNC: 41 U/L — SIGNIFICANT CHANGE UP (ref 40–120)
ALP SERPL-CCNC: 46 U/L — SIGNIFICANT CHANGE UP (ref 40–120)
ALP SERPL-CCNC: 46 U/L — SIGNIFICANT CHANGE UP (ref 40–120)
ALT FLD-CCNC: 32 U/L — SIGNIFICANT CHANGE UP (ref 10–45)
ALT FLD-CCNC: 32 U/L — SIGNIFICANT CHANGE UP (ref 10–45)
ALT FLD-CCNC: 34 U/L — SIGNIFICANT CHANGE UP (ref 10–45)
ALT FLD-CCNC: 34 U/L — SIGNIFICANT CHANGE UP (ref 10–45)
ANION GAP SERPL CALC-SCNC: 12 MMOL/L — SIGNIFICANT CHANGE UP (ref 5–17)
ANION GAP SERPL CALC-SCNC: 12 MMOL/L — SIGNIFICANT CHANGE UP (ref 5–17)
ANION GAP SERPL CALC-SCNC: 14 MMOL/L — SIGNIFICANT CHANGE UP (ref 5–17)
ANION GAP SERPL CALC-SCNC: 14 MMOL/L — SIGNIFICANT CHANGE UP (ref 5–17)
APPEARANCE UR: ABNORMAL
APPEARANCE UR: ABNORMAL
APTT BLD: 43.6 SEC — HIGH (ref 24.5–35.6)
APTT BLD: 43.6 SEC — HIGH (ref 24.5–35.6)
APTT BLD: 70.2 SEC — HIGH (ref 24.5–35.6)
APTT BLD: 70.2 SEC — HIGH (ref 24.5–35.6)
AST SERPL-CCNC: 39 U/L — SIGNIFICANT CHANGE UP (ref 10–40)
BACTERIA # UR AUTO: NEGATIVE /HPF — SIGNIFICANT CHANGE UP
BACTERIA # UR AUTO: NEGATIVE /HPF — SIGNIFICANT CHANGE UP
BASE EXCESS BLDMV CALC-SCNC: -0.5 MMOL/L — SIGNIFICANT CHANGE UP (ref -3–3)
BASE EXCESS BLDMV CALC-SCNC: -0.5 MMOL/L — SIGNIFICANT CHANGE UP (ref -3–3)
BASE EXCESS BLDMV CALC-SCNC: 0.5 MMOL/L — SIGNIFICANT CHANGE UP (ref -3–3)
BASE EXCESS BLDMV CALC-SCNC: 0.5 MMOL/L — SIGNIFICANT CHANGE UP (ref -3–3)
BASOPHILS # BLD AUTO: 0.01 K/UL — SIGNIFICANT CHANGE UP (ref 0–0.2)
BASOPHILS # BLD AUTO: 0.01 K/UL — SIGNIFICANT CHANGE UP (ref 0–0.2)
BASOPHILS NFR BLD AUTO: 0.1 % — SIGNIFICANT CHANGE UP (ref 0–2)
BASOPHILS NFR BLD AUTO: 0.1 % — SIGNIFICANT CHANGE UP (ref 0–2)
BILIRUB SERPL-MCNC: 0.6 MG/DL — SIGNIFICANT CHANGE UP (ref 0.2–1.2)
BILIRUB UR-MCNC: NEGATIVE — SIGNIFICANT CHANGE UP
BILIRUB UR-MCNC: NEGATIVE — SIGNIFICANT CHANGE UP
BUN SERPL-MCNC: 10 MG/DL — SIGNIFICANT CHANGE UP (ref 7–23)
BUN SERPL-MCNC: 10 MG/DL — SIGNIFICANT CHANGE UP (ref 7–23)
BUN SERPL-MCNC: 11 MG/DL — SIGNIFICANT CHANGE UP (ref 7–23)
BUN SERPL-MCNC: 11 MG/DL — SIGNIFICANT CHANGE UP (ref 7–23)
CALCIUM SERPL-MCNC: 8.3 MG/DL — LOW (ref 8.4–10.5)
CALCIUM SERPL-MCNC: 8.3 MG/DL — LOW (ref 8.4–10.5)
CALCIUM SERPL-MCNC: 8.7 MG/DL — SIGNIFICANT CHANGE UP (ref 8.4–10.5)
CALCIUM SERPL-MCNC: 8.7 MG/DL — SIGNIFICANT CHANGE UP (ref 8.4–10.5)
CAST: 0 /LPF — SIGNIFICANT CHANGE UP (ref 0–4)
CAST: 0 /LPF — SIGNIFICANT CHANGE UP (ref 0–4)
CHLORIDE SERPL-SCNC: 101 MMOL/L — SIGNIFICANT CHANGE UP (ref 96–108)
CHLORIDE SERPL-SCNC: 101 MMOL/L — SIGNIFICANT CHANGE UP (ref 96–108)
CHLORIDE SERPL-SCNC: 105 MMOL/L — SIGNIFICANT CHANGE UP (ref 96–108)
CHLORIDE SERPL-SCNC: 105 MMOL/L — SIGNIFICANT CHANGE UP (ref 96–108)
CO2 BLDMV-SCNC: 25 MMOL/L — SIGNIFICANT CHANGE UP (ref 21–29)
CO2 BLDMV-SCNC: 25 MMOL/L — SIGNIFICANT CHANGE UP (ref 21–29)
CO2 BLDMV-SCNC: 27 MMOL/L — SIGNIFICANT CHANGE UP (ref 21–29)
CO2 BLDMV-SCNC: 27 MMOL/L — SIGNIFICANT CHANGE UP (ref 21–29)
CO2 SERPL-SCNC: 17 MMOL/L — LOW (ref 22–31)
CO2 SERPL-SCNC: 17 MMOL/L — LOW (ref 22–31)
CO2 SERPL-SCNC: 19 MMOL/L — LOW (ref 22–31)
CO2 SERPL-SCNC: 19 MMOL/L — LOW (ref 22–31)
COLOR SPEC: YELLOW — SIGNIFICANT CHANGE UP
COLOR SPEC: YELLOW — SIGNIFICANT CHANGE UP
CREAT SERPL-MCNC: 1 MG/DL — SIGNIFICANT CHANGE UP (ref 0.5–1.3)
CREAT SERPL-MCNC: 1 MG/DL — SIGNIFICANT CHANGE UP (ref 0.5–1.3)
CREAT SERPL-MCNC: 1.21 MG/DL — SIGNIFICANT CHANGE UP (ref 0.5–1.3)
CREAT SERPL-MCNC: 1.21 MG/DL — SIGNIFICANT CHANGE UP (ref 0.5–1.3)
CULTURE RESULTS: SIGNIFICANT CHANGE UP
CULTURE RESULTS: SIGNIFICANT CHANGE UP
DIFF PNL FLD: ABNORMAL
DIFF PNL FLD: ABNORMAL
EGFR: 69 ML/MIN/1.73M2 — SIGNIFICANT CHANGE UP
EGFR: 69 ML/MIN/1.73M2 — SIGNIFICANT CHANGE UP
EGFR: 87 ML/MIN/1.73M2 — SIGNIFICANT CHANGE UP
EGFR: 87 ML/MIN/1.73M2 — SIGNIFICANT CHANGE UP
EOSINOPHIL # BLD AUTO: 0.16 K/UL — SIGNIFICANT CHANGE UP (ref 0–0.5)
EOSINOPHIL # BLD AUTO: 0.16 K/UL — SIGNIFICANT CHANGE UP (ref 0–0.5)
EOSINOPHIL NFR BLD AUTO: 2 % — SIGNIFICANT CHANGE UP (ref 0–6)
EOSINOPHIL NFR BLD AUTO: 2 % — SIGNIFICANT CHANGE UP (ref 0–6)
GAS PNL BLDA: SIGNIFICANT CHANGE UP
GAS PNL BLDA: SIGNIFICANT CHANGE UP
GAS PNL BLDMV: SIGNIFICANT CHANGE UP
GLUCOSE BLDC GLUCOMTR-MCNC: 130 MG/DL — HIGH (ref 70–99)
GLUCOSE BLDC GLUCOMTR-MCNC: 130 MG/DL — HIGH (ref 70–99)
GLUCOSE BLDC GLUCOMTR-MCNC: 157 MG/DL — HIGH (ref 70–99)
GLUCOSE BLDC GLUCOMTR-MCNC: 157 MG/DL — HIGH (ref 70–99)
GLUCOSE BLDC GLUCOMTR-MCNC: 195 MG/DL — HIGH (ref 70–99)
GLUCOSE BLDC GLUCOMTR-MCNC: 195 MG/DL — HIGH (ref 70–99)
GLUCOSE BLDC GLUCOMTR-MCNC: 208 MG/DL — HIGH (ref 70–99)
GLUCOSE BLDC GLUCOMTR-MCNC: 208 MG/DL — HIGH (ref 70–99)
GLUCOSE SERPL-MCNC: 187 MG/DL — HIGH (ref 70–99)
GLUCOSE SERPL-MCNC: 187 MG/DL — HIGH (ref 70–99)
GLUCOSE SERPL-MCNC: 93 MG/DL — SIGNIFICANT CHANGE UP (ref 70–99)
GLUCOSE SERPL-MCNC: 93 MG/DL — SIGNIFICANT CHANGE UP (ref 70–99)
GLUCOSE UR QL: 500 MG/DL
GLUCOSE UR QL: 500 MG/DL
HCO3 BLDMV-SCNC: 24 MMOL/L — SIGNIFICANT CHANGE UP (ref 20–28)
HCO3 BLDMV-SCNC: 24 MMOL/L — SIGNIFICANT CHANGE UP (ref 20–28)
HCO3 BLDMV-SCNC: 25 MMOL/L — SIGNIFICANT CHANGE UP (ref 20–28)
HCO3 BLDMV-SCNC: 25 MMOL/L — SIGNIFICANT CHANGE UP (ref 20–28)
HCT VFR BLD CALC: 38.3 % — LOW (ref 39–50)
HCT VFR BLD CALC: 38.3 % — LOW (ref 39–50)
HGB BLD-MCNC: 12.8 G/DL — LOW (ref 13–17)
HGB BLD-MCNC: 12.8 G/DL — LOW (ref 13–17)
HOROWITZ INDEX BLDMV+IHG-RTO: 32 — SIGNIFICANT CHANGE UP
IMM GRANULOCYTES NFR BLD AUTO: 0.4 % — SIGNIFICANT CHANGE UP (ref 0–0.9)
IMM GRANULOCYTES NFR BLD AUTO: 0.4 % — SIGNIFICANT CHANGE UP (ref 0–0.9)
KETONES UR-MCNC: 15 MG/DL
KETONES UR-MCNC: 15 MG/DL
LEUKOCYTE ESTERASE UR-ACNC: ABNORMAL
LEUKOCYTE ESTERASE UR-ACNC: ABNORMAL
LYMPHOCYTES # BLD AUTO: 1.97 K/UL — SIGNIFICANT CHANGE UP (ref 1–3.3)
LYMPHOCYTES # BLD AUTO: 1.97 K/UL — SIGNIFICANT CHANGE UP (ref 1–3.3)
LYMPHOCYTES # BLD AUTO: 24.1 % — SIGNIFICANT CHANGE UP (ref 13–44)
LYMPHOCYTES # BLD AUTO: 24.1 % — SIGNIFICANT CHANGE UP (ref 13–44)
MAGNESIUM SERPL-MCNC: 1.9 MG/DL — SIGNIFICANT CHANGE UP (ref 1.6–2.6)
MAGNESIUM SERPL-MCNC: 1.9 MG/DL — SIGNIFICANT CHANGE UP (ref 1.6–2.6)
MAGNESIUM SERPL-MCNC: 2.1 MG/DL — SIGNIFICANT CHANGE UP (ref 1.6–2.6)
MAGNESIUM SERPL-MCNC: 2.1 MG/DL — SIGNIFICANT CHANGE UP (ref 1.6–2.6)
MCHC RBC-ENTMCNC: 28.3 PG — SIGNIFICANT CHANGE UP (ref 27–34)
MCHC RBC-ENTMCNC: 28.3 PG — SIGNIFICANT CHANGE UP (ref 27–34)
MCHC RBC-ENTMCNC: 33.4 GM/DL — SIGNIFICANT CHANGE UP (ref 32–36)
MCHC RBC-ENTMCNC: 33.4 GM/DL — SIGNIFICANT CHANGE UP (ref 32–36)
MCV RBC AUTO: 84.7 FL — SIGNIFICANT CHANGE UP (ref 80–100)
MCV RBC AUTO: 84.7 FL — SIGNIFICANT CHANGE UP (ref 80–100)
MONOCYTES # BLD AUTO: 0.56 K/UL — SIGNIFICANT CHANGE UP (ref 0–0.9)
MONOCYTES # BLD AUTO: 0.56 K/UL — SIGNIFICANT CHANGE UP (ref 0–0.9)
MONOCYTES NFR BLD AUTO: 6.9 % — SIGNIFICANT CHANGE UP (ref 2–14)
MONOCYTES NFR BLD AUTO: 6.9 % — SIGNIFICANT CHANGE UP (ref 2–14)
NEUTROPHILS # BLD AUTO: 5.44 K/UL — SIGNIFICANT CHANGE UP (ref 1.8–7.4)
NEUTROPHILS # BLD AUTO: 5.44 K/UL — SIGNIFICANT CHANGE UP (ref 1.8–7.4)
NEUTROPHILS NFR BLD AUTO: 66.5 % — SIGNIFICANT CHANGE UP (ref 43–77)
NEUTROPHILS NFR BLD AUTO: 66.5 % — SIGNIFICANT CHANGE UP (ref 43–77)
NITRITE UR-MCNC: NEGATIVE — SIGNIFICANT CHANGE UP
NITRITE UR-MCNC: NEGATIVE — SIGNIFICANT CHANGE UP
NRBC # BLD: 0 /100 WBCS — SIGNIFICANT CHANGE UP (ref 0–0)
NRBC # BLD: 0 /100 WBCS — SIGNIFICANT CHANGE UP (ref 0–0)
O2 CT VFR BLD CALC: 41 MMHG — SIGNIFICANT CHANGE UP (ref 30–65)
O2 CT VFR BLD CALC: 41 MMHG — SIGNIFICANT CHANGE UP (ref 30–65)
O2 CT VFR BLD CALC: 44 MMHG — SIGNIFICANT CHANGE UP (ref 30–65)
O2 CT VFR BLD CALC: 44 MMHG — SIGNIFICANT CHANGE UP (ref 30–65)
PCO2 BLDMV: 39 MMHG — SIGNIFICANT CHANGE UP (ref 30–65)
PCO2 BLDMV: 39 MMHG — SIGNIFICANT CHANGE UP (ref 30–65)
PCO2 BLDMV: 41 MMHG — SIGNIFICANT CHANGE UP (ref 30–65)
PCO2 BLDMV: 41 MMHG — SIGNIFICANT CHANGE UP (ref 30–65)
PH BLDMV: 7.4 — SIGNIFICANT CHANGE UP (ref 7.32–7.45)
PH UR: 5.5 — SIGNIFICANT CHANGE UP (ref 5–8)
PH UR: 5.5 — SIGNIFICANT CHANGE UP (ref 5–8)
PHOSPHATE SERPL-MCNC: 2.2 MG/DL — LOW (ref 2.5–4.5)
PHOSPHATE SERPL-MCNC: 2.2 MG/DL — LOW (ref 2.5–4.5)
PHOSPHATE SERPL-MCNC: 2.4 MG/DL — LOW (ref 2.5–4.5)
PHOSPHATE SERPL-MCNC: 2.4 MG/DL — LOW (ref 2.5–4.5)
PLATELET # BLD AUTO: 128 K/UL — LOW (ref 150–400)
PLATELET # BLD AUTO: 128 K/UL — LOW (ref 150–400)
POTASSIUM SERPL-MCNC: 3.6 MMOL/L — SIGNIFICANT CHANGE UP (ref 3.5–5.3)
POTASSIUM SERPL-MCNC: 3.6 MMOL/L — SIGNIFICANT CHANGE UP (ref 3.5–5.3)
POTASSIUM SERPL-MCNC: 3.7 MMOL/L — SIGNIFICANT CHANGE UP (ref 3.5–5.3)
POTASSIUM SERPL-MCNC: 3.7 MMOL/L — SIGNIFICANT CHANGE UP (ref 3.5–5.3)
POTASSIUM SERPL-SCNC: 3.6 MMOL/L — SIGNIFICANT CHANGE UP (ref 3.5–5.3)
POTASSIUM SERPL-SCNC: 3.6 MMOL/L — SIGNIFICANT CHANGE UP (ref 3.5–5.3)
POTASSIUM SERPL-SCNC: 3.7 MMOL/L — SIGNIFICANT CHANGE UP (ref 3.5–5.3)
POTASSIUM SERPL-SCNC: 3.7 MMOL/L — SIGNIFICANT CHANGE UP (ref 3.5–5.3)
PROT SERPL-MCNC: 5.8 G/DL — LOW (ref 6–8.3)
PROT SERPL-MCNC: 5.8 G/DL — LOW (ref 6–8.3)
PROT SERPL-MCNC: 6.1 G/DL — SIGNIFICANT CHANGE UP (ref 6–8.3)
PROT SERPL-MCNC: 6.1 G/DL — SIGNIFICANT CHANGE UP (ref 6–8.3)
PROT UR-MCNC: NEGATIVE MG/DL — SIGNIFICANT CHANGE UP
PROT UR-MCNC: NEGATIVE MG/DL — SIGNIFICANT CHANGE UP
RBC # BLD: 4.52 M/UL — SIGNIFICANT CHANGE UP (ref 4.2–5.8)
RBC # BLD: 4.52 M/UL — SIGNIFICANT CHANGE UP (ref 4.2–5.8)
RBC # FLD: 14.4 % — SIGNIFICANT CHANGE UP (ref 10.3–14.5)
RBC # FLD: 14.4 % — SIGNIFICANT CHANGE UP (ref 10.3–14.5)
RBC CASTS # UR COMP ASSIST: 9 /HPF — HIGH (ref 0–4)
RBC CASTS # UR COMP ASSIST: 9 /HPF — HIGH (ref 0–4)
SAO2 % BLDMV: 68.7 — SIGNIFICANT CHANGE UP (ref 60–90)
SAO2 % BLDMV: 68.7 — SIGNIFICANT CHANGE UP (ref 60–90)
SAO2 % BLDMV: 74.4 — SIGNIFICANT CHANGE UP (ref 60–90)
SAO2 % BLDMV: 74.4 — SIGNIFICANT CHANGE UP (ref 60–90)
SODIUM SERPL-SCNC: 132 MMOL/L — LOW (ref 135–145)
SODIUM SERPL-SCNC: 132 MMOL/L — LOW (ref 135–145)
SODIUM SERPL-SCNC: 136 MMOL/L — SIGNIFICANT CHANGE UP (ref 135–145)
SODIUM SERPL-SCNC: 136 MMOL/L — SIGNIFICANT CHANGE UP (ref 135–145)
SP GR SPEC: 1.01 — SIGNIFICANT CHANGE UP (ref 1–1.03)
SP GR SPEC: 1.01 — SIGNIFICANT CHANGE UP (ref 1–1.03)
SPECIMEN SOURCE: SIGNIFICANT CHANGE UP
SPECIMEN SOURCE: SIGNIFICANT CHANGE UP
SQUAMOUS # UR AUTO: 0 /HPF — SIGNIFICANT CHANGE UP (ref 0–5)
SQUAMOUS # UR AUTO: 0 /HPF — SIGNIFICANT CHANGE UP (ref 0–5)
UFH PPP CHRO-ACNC: 0.2 IU/ML — LOW (ref 0.3–0.7)
UFH PPP CHRO-ACNC: 0.2 IU/ML — LOW (ref 0.3–0.7)
UROBILINOGEN FLD QL: 0.2 MG/DL — SIGNIFICANT CHANGE UP (ref 0.2–1)
UROBILINOGEN FLD QL: 0.2 MG/DL — SIGNIFICANT CHANGE UP (ref 0.2–1)
WBC # BLD: 8.17 K/UL — SIGNIFICANT CHANGE UP (ref 3.8–10.5)
WBC # BLD: 8.17 K/UL — SIGNIFICANT CHANGE UP (ref 3.8–10.5)
WBC # FLD AUTO: 8.17 K/UL — SIGNIFICANT CHANGE UP (ref 3.8–10.5)
WBC # FLD AUTO: 8.17 K/UL — SIGNIFICANT CHANGE UP (ref 3.8–10.5)
WBC UR QL: 2 /HPF — SIGNIFICANT CHANGE UP (ref 0–5)
WBC UR QL: 2 /HPF — SIGNIFICANT CHANGE UP (ref 0–5)

## 2023-11-04 PROCEDURE — 99292 CRITICAL CARE ADDL 30 MIN: CPT

## 2023-11-04 PROCEDURE — 71045 X-RAY EXAM CHEST 1 VIEW: CPT | Mod: 26

## 2023-11-04 PROCEDURE — 93010 ELECTROCARDIOGRAM REPORT: CPT | Mod: 76

## 2023-11-04 PROCEDURE — 99291 CRITICAL CARE FIRST HOUR: CPT

## 2023-11-04 PROCEDURE — 99291 CRITICAL CARE FIRST HOUR: CPT | Mod: GC

## 2023-11-04 RX ORDER — POTASSIUM CHLORIDE 20 MEQ
20 PACKET (EA) ORAL ONCE
Refills: 0 | Status: COMPLETED | OUTPATIENT
Start: 2023-11-04 | End: 2023-11-04

## 2023-11-04 RX ORDER — HYDRALAZINE HCL 50 MG
30 TABLET ORAL EVERY 8 HOURS
Refills: 0 | Status: DISCONTINUED | OUTPATIENT
Start: 2023-11-05 | End: 2023-11-05

## 2023-11-04 RX ORDER — SPIRONOLACTONE 25 MG/1
25 TABLET, FILM COATED ORAL DAILY
Refills: 0 | Status: DISCONTINUED | OUTPATIENT
Start: 2023-11-04 | End: 2023-11-23

## 2023-11-04 RX ORDER — HEPARIN SODIUM 5000 [USP'U]/ML
950 INJECTION INTRAVENOUS; SUBCUTANEOUS
Qty: 25000 | Refills: 0 | Status: DISCONTINUED | OUTPATIENT
Start: 2023-11-04 | End: 2023-11-07

## 2023-11-04 RX ORDER — HYDRALAZINE HCL 50 MG
10 TABLET ORAL EVERY 8 HOURS
Refills: 0 | Status: DISCONTINUED | OUTPATIENT
Start: 2023-11-04 | End: 2023-11-04

## 2023-11-04 RX ORDER — POTASSIUM CHLORIDE 20 MEQ
40 PACKET (EA) ORAL ONCE
Refills: 0 | Status: COMPLETED | OUTPATIENT
Start: 2023-11-04 | End: 2023-11-04

## 2023-11-04 RX ORDER — HYDRALAZINE HCL 50 MG
20 TABLET ORAL EVERY 8 HOURS
Refills: 0 | Status: DISCONTINUED | OUTPATIENT
Start: 2023-11-04 | End: 2023-11-04

## 2023-11-04 RX ORDER — MAGNESIUM SULFATE 500 MG/ML
1 VIAL (ML) INJECTION ONCE
Refills: 0 | Status: COMPLETED | OUTPATIENT
Start: 2023-11-04 | End: 2023-11-04

## 2023-11-04 RX ORDER — POTASSIUM PHOSPHATE, MONOBASIC POTASSIUM PHOSPHATE, DIBASIC 236; 224 MG/ML; MG/ML
30 INJECTION, SOLUTION INTRAVENOUS ONCE
Refills: 0 | Status: COMPLETED | OUTPATIENT
Start: 2023-11-04 | End: 2023-11-04

## 2023-11-04 RX ORDER — INSULIN GLARGINE 100 [IU]/ML
8 INJECTION, SOLUTION SUBCUTANEOUS AT BEDTIME
Refills: 0 | Status: DISCONTINUED | OUTPATIENT
Start: 2023-11-04 | End: 2023-11-09

## 2023-11-04 RX ORDER — ACETAMINOPHEN 500 MG
1000 TABLET ORAL ONCE
Refills: 0 | Status: COMPLETED | OUTPATIENT
Start: 2023-11-04 | End: 2023-11-04

## 2023-11-04 RX ORDER — HYDRALAZINE HCL 50 MG
10 TABLET ORAL ONCE
Refills: 0 | Status: COMPLETED | OUTPATIENT
Start: 2023-11-04 | End: 2023-11-04

## 2023-11-04 RX ORDER — ISOSORBIDE DINITRATE 5 MG/1
20 TABLET ORAL THREE TIMES A DAY
Refills: 0 | Status: DISCONTINUED | OUTPATIENT
Start: 2023-11-05 | End: 2023-11-05

## 2023-11-04 RX ORDER — ISOSORBIDE DINITRATE 5 MG/1
10 TABLET ORAL THREE TIMES A DAY
Refills: 0 | Status: DISCONTINUED | OUTPATIENT
Start: 2023-11-04 | End: 2023-11-04

## 2023-11-04 RX ADMIN — Medication 400 MILLIGRAM(S): at 03:46

## 2023-11-04 RX ADMIN — Medication 0.25 MILLIGRAM(S): at 10:14

## 2023-11-04 RX ADMIN — Medication 1000 MILLIGRAM(S): at 21:23

## 2023-11-04 RX ADMIN — HEPARIN SODIUM 9.5 UNIT(S)/HR: 5000 INJECTION INTRAVENOUS; SUBCUTANEOUS at 10:15

## 2023-11-04 RX ADMIN — POTASSIUM PHOSPHATE, MONOBASIC POTASSIUM PHOSPHATE, DIBASIC 83.33 MILLIMOLE(S): 236; 224 INJECTION, SOLUTION INTRAVENOUS at 21:42

## 2023-11-04 RX ADMIN — Medication 10 MILLIGRAM(S): at 17:52

## 2023-11-04 RX ADMIN — ATORVASTATIN CALCIUM 80 MILLIGRAM(S): 80 TABLET, FILM COATED ORAL at 21:43

## 2023-11-04 RX ADMIN — Medication 1000 MILLIGRAM(S): at 04:24

## 2023-11-04 RX ADMIN — SODIUM NITROPRUSSIDE 7.42 MICROGRAM(S)/KG/MIN: 50 INJECTION INTRAVENOUS at 05:19

## 2023-11-04 RX ADMIN — Medication 20 MILLIGRAM(S): at 22:04

## 2023-11-04 RX ADMIN — Medication 400 MILLIGRAM(S): at 20:47

## 2023-11-04 RX ADMIN — Medication 81 MILLIGRAM(S): at 12:05

## 2023-11-04 RX ADMIN — INSULIN GLARGINE 8 UNIT(S): 100 INJECTION, SOLUTION SUBCUTANEOUS at 22:01

## 2023-11-04 RX ADMIN — HEPARIN SODIUM 11 UNIT(S)/HR: 5000 INJECTION INTRAVENOUS; SUBCUTANEOUS at 19:55

## 2023-11-04 RX ADMIN — HEPARIN SODIUM 11 UNIT(S)/HR: 5000 INJECTION INTRAVENOUS; SUBCUTANEOUS at 18:32

## 2023-11-04 RX ADMIN — SPIRONOLACTONE 25 MILLIGRAM(S): 25 TABLET, FILM COATED ORAL at 09:17

## 2023-11-04 RX ADMIN — SODIUM NITROPRUSSIDE 7.42 MICROGRAM(S)/KG/MIN: 50 INJECTION INTRAVENOUS at 19:55

## 2023-11-04 RX ADMIN — Medication 0.5 MILLIGRAM(S): at 13:09

## 2023-11-04 RX ADMIN — Medication 4: at 16:38

## 2023-11-04 RX ADMIN — ISOSORBIDE DINITRATE 10 MILLIGRAM(S): 5 TABLET ORAL at 21:55

## 2023-11-04 RX ADMIN — Medication 40 MILLIEQUIVALENT(S): at 01:53

## 2023-11-04 RX ADMIN — Medication 10 MILLIGRAM(S): at 23:50

## 2023-11-04 RX ADMIN — Medication 1000 MILLIGRAM(S): at 12:57

## 2023-11-04 RX ADMIN — Medication 0.5 MILLIGRAM(S): at 03:17

## 2023-11-04 RX ADMIN — Medication 20 MILLIEQUIVALENT(S): at 21:42

## 2023-11-04 RX ADMIN — TICAGRELOR 90 MILLIGRAM(S): 90 TABLET ORAL at 17:12

## 2023-11-04 RX ADMIN — Medication 400 MILLIGRAM(S): at 12:27

## 2023-11-04 RX ADMIN — SODIUM NITROPRUSSIDE 7.42 MICROGRAM(S)/KG/MIN: 50 INJECTION INTRAVENOUS at 15:07

## 2023-11-04 RX ADMIN — Medication 100 GRAM(S): at 01:53

## 2023-11-04 RX ADMIN — SODIUM NITROPRUSSIDE 7.42 MICROGRAM(S)/KG/MIN: 50 INJECTION INTRAVENOUS at 08:23

## 2023-11-04 RX ADMIN — PANTOPRAZOLE SODIUM 40 MILLIGRAM(S): 20 TABLET, DELAYED RELEASE ORAL at 08:20

## 2023-11-04 RX ADMIN — Medication 2: at 12:12

## 2023-11-04 RX ADMIN — TICAGRELOR 90 MILLIGRAM(S): 90 TABLET ORAL at 05:18

## 2023-11-04 NOTE — PROGRESS NOTE ADULT - ASSESSMENT
57 yo M with HTN, CAD (s/p PCI 2008), HFrEF (EF severely reduced 2018) not on GDMT, CVA 2018 (requiring tpA), DM presenting with c/o SOB for a week before presenting to the hospital. He had been treated for PNA. He was admitted with c/o chest pressure. Per family, patient passed out, EMS was called and reportedly defibrillated the patient. He was taken to [facility] where he was found to have ACS but he left AMA and came to [facility].    On arrival, ECG showed ST depression in lateral leads, no c/o chest pain. Pro-BNP 4k. Patient was intubated prior to [facility] on 11/1 for respiratory failure. [facility] 11/1: pLAD 70 % stenosis in the ostium portion of the segment and 100 % in-stent restenosis. mRCA, 100 % stenosis. RA 23, PA 51/33, wedge 31, PA sat 54%, CI 1.5. S/p IABP on 11/1. He was successfully extubated to nasal canula on 11/3 and was also noted to be COVID positive now with new fevers.       Cardiac Studies  11/1/23  [facility] showed pLAD 70 % stenosis in the ostium portion of the segment and 100 % in-stent restenosis and in mRCA, 100 % stenosis.   11/1/23 [facility]; RA 23 Vw 24, PA 52/33/40, PCWP 30 Vw 33, AO 85/66/73, PA sat 54.4%, CO/CI (F) 2.96/1.44, IABP was placed during the cath through R common femoral artery.   11/1/23 TTE: LVIDd 6.4cm , LVEF 32%, regional WMA, grade II DD,  TAPSE 1.7cm, mld MR, trace TR,     Bedside hemodynamics  11/3: IABP 1:1 RA 5; PA 27/12/18; CO/CI 5.6/2.6; MAP 90-100s.  11/4/23 IABP 1:3 CVP 4 PA 37/18 SvO2 83.8 CO/CI 11.2 CI 5.1  (in the setting of fever to 38.3C)

## 2023-11-04 NOTE — SWALLOW BEDSIDE ASSESSMENT ADULT - SWALLOW EVAL: DIAGNOSIS
Pt admitted to CICU with cardiogenic shock; s/p intubation and IABP; extubated 11/3. Patient presents with an overtly functional oropharyngeal swallow sequence for regular solids and thin liquids. No overt s/s of laryngeal penetration/aspiration across consistencies administered.

## 2023-11-04 NOTE — PROGRESS NOTE ADULT - SUBJECTIVE AND OBJECTIVE BOX
LD VALENCIA  MRN-[id number]  Patient is a 58y old  Male who presents with a chief complaint of CP (2023 22:02)    HPI:  pt seen and approx 1:30 pm  in [facility]    59yo M w/ hx HTN, CAD w/ 1 stent in , ICH () presenting with abn ekg. Patient presented to [facility] where he was found to have STEMI, recommended to get cath however patient did not want to get it there so it left and came here.  Patient initially had cough, congestion, fever, was placed on antibiotics on .  Started feeling nauseous and had a presyncopal event after which he presented to ED last night.  Had chest pain as well.  Chest pain is midsternal.  Not currently having chest pain.  Received 4 aspirin 30 min pta. (2023 15:11)      Hospital Course:    24 HOUR EVENTS:    REVIEW OF SYSTEMS:    CONSTITUTIONAL: No weakness, fevers or chills  EYES/ENT: No visual changes;  No vertigo or throat pain   NECK: No pain or stiffness  RESPIRATORY: No cough, wheezing, hemoptysis; No shortness of breath  CARDIOVASCULAR: No chest pain or palpitations  GASTROINTESTINAL: No abdominal or epigastric pain. No nausea, vomiting, or hematemesis; No diarrhea or constipation. No melena or hematochezia.  GENITOURINARY: No dysuria, frequency or hematuria  NEUROLOGICAL: No numbness or weakness  SKIN: No itching, rashes      ICU Vital Signs Last 24 Hrs  T(C): 38.3 (2023 20:00), Max: 38.3 (2023 03:00)  T(F): 100.9 (2023 20:00), Max: 100.9 (2023 03:00)  HR: 112 (2023 21:15) (92 - 112)  BP: --  BP(mean): --  ABP: 131/71 (2023 21:15) (105/66 - 164/79)  ABP(mean): 93 (2023 21:15) (85 - 116)  RR: 17 (2023 21:15) (8 - 26)  SpO2: 95% (2023 21:15) (93% - 96%)    O2 Parameters below as of 2023 20:00  Patient On (Oxygen Delivery Method): nasal cannula  O2 Flow (L/min): 3          CVP(mm Hg): 11 (23 @ 21:15) (0 - 18)  CO: 5.6 (23 @ 02:00) (5.6 - 5.6)  CI: 2.6 (23 @ 02:00) (2.6 - 2.6)  PA: 47/26 (23 @ 21:15) ( - /)  PA(mean): 35 (23 @ 21:15) (7 - 39)  PA(direct): --  PCWP: --  LA: --  RA: --  SVR: 1242 (23 @ 02:00) (1242 - 1242)  SVRI: --  PVR: --  PVRI: --  I&O's Summary    2023 07:01  -  2023 07:00  --------------------------------------------------------  IN: 1216.1 mL / OUT: 1760 mL / NET: -543.9 mL    2023 08:01  -  2023 21:28  --------------------------------------------------------  IN: 1267.5 mL / OUT: 2035 mL / NET: -767.5 mL        CAPILLARY BLOOD GLUCOSE    CAPILLARY BLOOD GLUCOSE      POCT Blood Glucose.: 208 mg/dL (2023 16:35)      PHYSICAL EXAM:  General: WN/WD NAD  HEENT: PERRLA, moist mucous membranes  Neurology: A&Ox3, no focal deficits  Respiratory: CTA B/L, normal respiratory effort, no wheezes, crackles, rales  CV: RRR, S1S2, no murmurs, rubs or gallops  Abdominal: Soft, NT, ND  Extremities: No edema    ============================I/O===========================   I&O's Detail    2023 07:01  -  2023 07:00  --------------------------------------------------------  IN:    Dexmedetomidine: 74.1 mL    Heparin: 76 mL    Heparin: 114 mL    Nitroprusside: 472 mL    Oral Fluid: 480 mL  Total IN: 1216.1 mL    OUT:    Indwelling Catheter - Urethral (mL): 1760 mL    Norepinephrine: 0 mL    Propofol: 0 mL  Total OUT: 1760 mL    Total NET: -543.9 mL      2023 08:01  -  2023 21:28  --------------------------------------------------------  IN:    Heparin: 118.5 mL    IV PiggyBack: 200 mL    Nitroprusside: 549 mL    Oral Fluid: 400 mL  Total IN: 1267.5 mL    OUT:    Indwelling Catheter - Urethral (mL): 2035 mL  Total OUT: 2035 mL    Total NET: -767.5 mL        ============================ LABS =========================                        12.8   8.17  )-----------( 128      ( 2023 00:59 )             38.3     -    132<L>  |  101  |  11  ----------------------------<  187<H>  3.7   |  19<L>  |  1.21    Ca    8.7      2023 20:14  Phos  2.2       Mg     2.1         TPro  6.1  /  Alb  3.1<L>  /  TBili  0.6  /  DBili  x   /  AST  39  /  ALT  32  /  AlkPhos  46      Troponin T, High Sensitivity Result: 1226 ng/L (23 @ 01:49)              LIVER FUNCTIONS - ( 2023 20:14 )  Alb: 3.1 g/dL / Pro: 6.1 g/dL / ALK PHOS: 46 U/L / ALT: 32 U/L / AST: 39 U/L / GGT: x           PTT - ( 2023 17:21 )  PTT:43.6 sec  ABG - ( 2023 10:22 )  pH, Arterial: 7.45  pH, Blood: x     /  pCO2: 31    /  pO2: 114   / HCO3: 22    / Base Excess: -1.6  /  SaO2: 98.3              Blood Gas Arterial, Lactate: 0.7 mmol/L (23 @ 10:22)  Blood Gas Arterial, Lactate: 0.8 mmol/L (23 @ 22:00)  Blood Gas Arterial, Lactate: 0.9 mmol/L (23 @ 18:07)  Blood Gas Arterial, Lactate: 1.0 mmol/L (23 @ 12:16)  Blood Gas Arterial, Lactate: 0.9 mmol/L (23 @ 09:50)  Blood Gas Arterial, Lactate: 0.9 mmol/L (23 @ 01:47)  Blood Gas Arterial, Lactate: 1.0 mmol/L (23 @ 02:45)  Blood Gas Arterial, Lactate: 1.1 mmol/L (23 @ 21:37)    Urinalysis Basic - ( 2023 20:14 )    Color: x / Appearance: x / SG: x / pH: x  Gluc: 187 mg/dL / Ketone: x  / Bili: x / Urobili: x   Blood: x / Protein: x / Nitrite: x   Leuk Esterase: x / RBC: x / WBC x   Sq Epi: x / Non Sq Epi: x / Bacteria: x      ======================Micro/Rad/Cardio=================  Telemtry: Reviewed   EKG: Reviewed  CXR: Reviewed  Culture: Reviewed   Echo:   Cath: Cardiac Cath Lab - Adult:   [facility]  Department of Cardiology  [address] [place] [id number]  [phone]  Cath Lab Report -- Comprehensive Report  Patient: LD VALENCIA  Study date: 2017  Account number: [id number]  MR number: [id number]  : 1965  Gender: Male  Race: W  Case Physician(s):  Jairon Holguin M.D.  Referring Physician:  Luc Lynn M.D.  INDICATIONS: Unstable angina - CCS4.  HISTORY: The patient has a history of coronary artery disease. The patient  hashypertension and medication-treated dyslipidemia.  PROCEDURE:  --  Left heart catheterization with ventriculography.  --  Left coronary angiography.  --  Right coronary angiography.  TECHNIQUE: The risks and alternatives of the procedures and conscious  sedation were explained to the patient and informed consent was obtained.  Cardiac catheterization performed electively.  Local anesthetic given. Right radial artery access. A 6FR PRELUDE KIT was  inserted in the vessel. Left heart catheterization. Ventriculography was  performed. A 5FR FR4.0 EXPO catheter was utilized. Left coronary artery  angiography. The vessel was injected utilizing a 5FR FL3.5 EXPO catheter.  Right coronary artery angiography. The vessel was injected utilizing a 5FR  FR4.0 EXPO catheter. RADIATION EXPOSURE: 1.1 min.  CONTRAST GIVEN: Omnipaque 55 ml.  MEDICATIONS GIVEN: Midazolam, 1 mg, IV. Fentanyl, 25 mcg, IV. Verapamil  (Isoptin, Calan, Covera), 2.5 mg, IA. Heparin, 3000 units, IA.  VENTRICLES: Global left ventricular function was moderately depressed. EF  estimated was 40 %.  CORONARY VESSELS: The coronary circulation is right dominant.  LM:   --  LM: Normal.  LAD:   --  Proximal LAD: There was a 50 % stenosis.  CX:   --  Circumflex: Normal.  RCA:   --  Mid RCA: There was a 40 % stenosis.  --  Distal RCA: There was a 50 % stenosis.  COMPLICATIONS: There were no complications.  DIAGNOSTIC RECOMMENDATIONS: The patient should continue with the present  medications.  Prepared and signed by  Jairon Holguin M.D.  Signed 2017 12:20:13  HEMODYNAMIC TABLES  Pressures:  Baseline/ Room Air  Pressures:  - HR: 78  Pressures:  - Rhythm:  Pressures:  -- Aortic Pressure (S/D/M): --/--/99  Pressures:  -- Left Ventricle (s/edp): 157/39/--  Outputs:  Baseline/ Room Air  Outputs:  -- CALCULATIONS[age]  Outputs:  -- CALCULATIONS: Body Surface Area: 2.05  Outputs:  -- CALCULATIONS: Height in cm: 175.00  Outputs:  -- CALCULATIONS: Sex: Male  Outputs:  -- CALCULATIONS: Weight in k.40 (17 @ 21:55)    ======================================================  PAST MEDICAL & SURGICAL HISTORY:  HTN (hypertension)      CAD (coronary artery disease)  ; stent      Intracranial hemorrhage        Respiratory arrest        Myocardial infarction, unspecified MI type, unspecified artery      History of coronary artery stent placement        ====================ASSESSMENT ==============  57 yo M with HTN, CAD (s/p PCI ), HFrEF (EF severely reduced ) not on GDMT, CVA  (requiring tpA), DM presenting with chest pressure and unknown tachycardia that was shocked x1, C  found to have chronic total occlusion of LAD and RCA, with elevated RA and PA pressures, echo  w/ severely decreased EF 32%, s/p IABP , likely in cardiogenic shock 2/2 acute on chronic HF s/p unknown shockable rhythm.    ============================= NEUROLOGY =============================  A&Ox3, s/p extubation 11/3    Hx of CVA requiring TPA c/b ICH  -no neuro deficits known    ============================= Anxiety =============================  Takes 0.5 ativan PRN at home  -will give 0.25-0.5 ativan PRN for anxiety here  -psych consult on Monday for medical management of anxiety    ============================= RESPIRATORY =============================  Intubated for AHRF prior to [facility], extubated 11/3  - wean O2 as tolerated, currently on NC  - trend ABGs  - continue to monitor SpO2 with goal >94%  - COVID +  -watch off abx; vanc and cefepime ()  -MRSA swab neg    #R sided opacification on CXR   -improved today 11/3  -Possibly iso aspiration event prior to intubation  -watch off abx; vanc and cefepime ()  -MRSA swab neg  -sputum cx  no growth    #History of PNA  Pt initially had cough, congestion, fever, was placed on antibiotics on Juan 10/29  -CXR here clear  -s/p levaquin (10/29-10/31)  -if afebrile, remains w/o leukocytosis       #Asthma (hx of respiratory failure in 2018- intubated for 20 minutes per chart review pt report)  -c/w albuterol qd  -c/ed montelukast- does not take at home  -c/w home trelegy     ============================= CARDIOVASCULAR =============================    #Cardiogenic Shock requiring IABP (-) s/p unknown shockable event due to ischemia?  -  LHC: pLAD 100 % in-stent restenosis & mRCA, 100 %. PCWP 30. +IABP.  -  TTE: LV dilated. EF 32 %. Regional WMAs present, mod (grade 2) LV diastolic dysfunction  -per echo areas of akinesis are not new compared to 2017, but EF is severely decreased   -currently on IABP 1:1, with augmentation of 130s, Mean 120s  -c/w nipride gtt for weaning of IABP  -s/p levo  -daily CXR for IABP  -Keep right leg straight while sheaths/IABP/[place] are in position       #STEMI at OSH  -Trop 440-> 414  -EKG here w/ LBBB  -LHC : pLAD 70 % stenosis in the ostium portion of the segment and 100 % in-stent restenosis. mRCA, 100 % stenosis. RA 23, PA 51/33, wedge 31, PA sat 54%, CI 1.5  -s/p IABP  -c/w ASA, brillinta (takes plavix at home?)  -c/w lipitor 80  -c/w heparin gtt  -TTE : severely reduced EF, no new areas of akinesis  -CT surg not recommending CABG, instead AT eval  -viability study when IABP removed    #HFrEF w/ severely reduced EF 32%  TTE : LV moderately dilated. EF 32 %. Regional wall motion abnormalities present, mod (grade 2) LV diastolic dysfunction, with indeterminant filling pressure.   TTE : LV thrombus, EF of 22% w/ global hypokinesis  -s/p lasix 40  -continue management of shock as above  -start GDMT when stable  - BNP 4168 on admission  - CXR  clear   - tele  - S/p lasix 40 on admission  -presently euvolemic w/ CVP 6  - Strict I&O  - Daily weights  - Fluid restriction  - on ozempic and dapaglifozin at home    #LV thrombus  TTE : LV thrombus, EF of 22% w/ global hypokinesis  -c/w heparin gtt    #HTN  -hold home losartan 25, HCTZ 25, coreg 25 bid for now    ============================= RENAL =============================  Baseline Cr: 1-1.22  -trend Cr    #Metabolic acidosis  Likely 2/2 lactate of 3.7, due to cardiogenic shock  Improved  -trend lactate, blood gas  -IABP     ============================= GI =============================  #Transaminitis  Likely iso cardiogenic shock/abnormal shockable event  AST/ALT: 94/50 on admission  -trend LFTs    #Diet:   -passed dysphagia screen  -will advance diet to soft and bite sized    GERD  -changed PPI to qd    #Bowel regimen:   -miralax  -senna    ============================= ENDO =============================  #Type 2 DM  A1c 8.3  -TSH 0.50    METABOLIC:  -no active issues    ============================= HEMATOLOGIC =============================  H/H stable    #DVT prophylaxis with heparin    ============================= ID =============================  #Concern for aspiration event prior to intubation  R sided opacification on CXR , clear today 11/3  -Possibly iso aspiration event prior to intubation  -pt febrile to 100.6 today ()  -repeat blood cx today   -UA  neg  -f/u CXR  -per ID, no change in plan   -monitor off vanc and cefepime ()  -MRSA swab negative  -sputum cx  no growth  -blood cx  no growth    #History of PNA  Pt initially had cough, congestion, fever, was placed on antibiotics on Juan 10/29  -CXR  clear  -s/p levaquin (10/29-10/31)    ============================= SKIN =============================  #Lines: IABP (-, [place] (-    Patient requires continuous monitoring with bedside rhythm monitoring, pulse ox monitoring, and intermittent blood gas analysis. Care plan discussed with ICU care team. Patient remained critical and at risk for life threatening decompensation.  Patient seen, examined and plan discussed with CCU team during rounds.     I have personally provided ____ minutes of critical care time excluding time spent on separate procedures, in addition to initial critical care time provided by the CICU Attending, Dr. Cunningham.    By signing my name below, I, Sonia Preciado, attest that this documentation has been prepared under the direction and in the presence of Christoph Salinas NP  Electronically signed: Sangita Freitas, 23 @ 21:28    IRita Kadel NP , personally performed the services described in this documentation. all medical record entries made by the sangita were at my direction and in my presence. I have reviewed the chart and agree that the record reflects my personal performance and is accurate and complete  Electronically signed: Christoph Salinas NP       LD VALENCIA  MRN-[id number]  Patient is a 58y old  Male who presents with a chief complaint of CP (2023 22:02)    HPI: 58M w/ hx HTN, CAD w/ 1 stent in , ICH () presenting with abn ekg. Patient presented to [facility] where he was found to have STEMI, recommended to get cath however patient did not want to get it there so it left and came here.  Patient initially had cough, congestion, fever, was placed on antibiotics on .  Started feeling nauseous and had a presyncopal event after which he presented to ED last night.  Had chest pain as well.  Chest pain is midsternal.  Not currently having chest pain.  Received 4 aspirin 30 min pta. (2023 15:11)    REVIEW OF SYSTEMS:  CONSTITUTIONAL: No weakness, fevers or chills  EYES/ENT: No visual changes;  No vertigo or throat pain   NECK: No pain or stiffness  RESPIRATORY: No cough, wheezing, hemoptysis; No shortness of breath  CARDIOVASCULAR: No chest pain or palpitations  GASTROINTESTINAL: No abdominal or epigastric pain. No nausea, vomiting, or hematemesis; No diarrhea or constipation. No melena or hematochezia.  GENITOURINARY: No dysuria, frequency or hematuria  NEUROLOGICAL: No numbness or weakness  SKIN: No itching, rashes      ICU Vital Signs Last 24 Hrs  T(C): 38.3 (2023 20:00), Max: 38.3 (2023 03:00)  T(F): 100.9 (2023 20:00), Max: 100.9 (2023 03:00)  HR: 112 (2023 21:15) (92 - 112)  ABP: 131/71 (2023 21:15) (105/66 - 164/79)  ABP(mean): 93 (2023 21:15) (85 - 116)  RR: 17 (2023 21:15) (8 - 26)  SpO2: 95% (2023 21:15) (93% - 96%)    O2 Parameters below as of 2023 20:00  Patient On (Oxygen Delivery Method): nasal cannula  O2 Flow (L/min): 3      CVP(mm Hg): 11 (11-04-23 @ 21:15) (0 - 18)  CO: 5.6 (23 @ 02:00) (5.6 - 5.6)  CI: 2.6 (23 @ 02:00) (2.6 - 2.6)  PA: 47/26 (23 @ 21:15) ( - )  PA(mean): 35 (23 @ 21:15) (7 - 39)  SVR: 1242 (23 @ 02:00) (1242 - 1242)      I&O's Summary    2023 07:01  -  2023 07:00  --------------------------------------------------------  IN: 1216.1 mL / OUT: 1760 mL / NET: -543.9 mL    2023 08:01  -  2023 21:28  --------------------------------------------------------  IN: 1267.5 mL / OUT: 2035 mL / NET: -767.5 mL    CAPILLARY BLOOD GLUCOSE  POCT Blood Glucose.: 208 mg/dL (2023 16:35)      PHYSICAL EXAM:  General: WN/WD NAD  HEENT: PERRLA, moist mucous membranes  Neurology: A&Ox3, no focal deficits  Respiratory: CTA B/L, normal respiratory effort, no wheezes, crackles, rales  CV: RRR, S1S2, no murmurs, rubs or gallops  Abdominal: Soft, NT, ND  Extremities: No edema  ============================I/O===========================   I&O's Detail    2023 07:01  -  2023 07:00  --------------------------------------------------------  IN:    Dexmedetomidine: 74.1 mL    Heparin: 76 mL    Heparin: 114 mL    Nitroprusside: 472 mL    Oral Fluid: 480 mL  Total IN: 1216.1 mL    OUT:    Indwelling Catheter - Urethral (mL): 1760 mL    Norepinephrine: 0 mL    Propofol: 0 mL  Total OUT: 1760 mL    Total NET: -543.9 mL      2023 08:01  -  2023 21:28  --------------------------------------------------------  IN:    Heparin: 118.5 mL    IV PiggyBack: 200 mL    Nitroprusside: 549 mL    Oral Fluid: 400 mL  Total IN: 1267.5 mL    OUT:    Indwelling Catheter - Urethral (mL): 2035 mL  Total OUT: 2035 mL    Total NET: -767.5 mL  ============================ LABS =========================                      12.8   8.17  )-----------( 128      ( 2023 00:59 )             38.3         132<L>  |  101  |  11  ----------------------------<  187<H>  3.7   |  19<L>  |  1.21    Ca    8.7      2023 20:14  Phos  2.2       Mg     2.1         TPro  6.1  /  Alb  3.1<L>  /  TBili  0.6  /  DBili  x   /  AST  39  /  ALT  32  /  AlkPhos  46      Troponin T, High Sensitivity Result: 1226 ng/L (23 @ 01:49)    LIVER FUNCTIONS - ( 2023 20:14 )  Alb: 3.1 g/dL / Pro: 6.1 g/dL / ALK PHOS: 46 U/L / ALT: 32 U/L / AST: 39 U/L / GGT: x           PTT - ( 2023 17:21 )  PTT:43.6 sec  ABG - ( 2023 10:22 )  pH, Arterial: 7.45  pH, Blood: x     /  pCO2: 31    /  pO2: 114   / HCO3: 22    / Base Excess: -1.6  /  SaO2: 98.3      Blood Gas Arterial, Lactate: 0.7 mmol/L (23 @ 10:22)  Blood Gas Arterial, Lactate: 0.8 mmol/L (23 @ 22:00)  Blood Gas Arterial, Lactate: 0.9 mmol/L (23 @ 18:07)  Blood Gas Arterial, Lactate: 1.0 mmol/L (23 @ 12:16)  Blood Gas Arterial, Lactate: 0.9 mmol/L (23 @ 09:50)  Blood Gas Arterial, Lactate: 0.9 mmol/L (23 @ 01:47)  Blood Gas Arterial, Lactate: 1.0 mmol/L (23 @ 02:45)  Blood Gas Arterial, Lactate: 1.1 mmol/L (23 @ 21:37)    Urinalysis Basic - ( 2023 20:14 )    Color: x / Appearance: x / SG: x / pH: x  Gluc: 187 mg/dL / Ketone: x  / Bili: x / Urobili: x   Blood: x / Protein: x / Nitrite: x   Leuk Esterase: x / RBC: x / WBC x   Sq Epi: x / Non Sq Epi: x / Bacteria: x  ======================Micro/Rad/Cardio=================  Telemtry: Reviewed   EKG: Reviewed  CXR: Reviewed  Culture: Reviewed   Echo:   Cath: Cardiac Cath Lab - Adult:   [facility]  Department of Cardiology  [address] [place] [address]  [phone]  Cath Lab Report -- Comprehensive Report  Patient: LD VALENCIA  Study date: 2017  Account number: [id number]  MR number: [id number]  : 1965  Gender: Male  Race: W  Case Physician(s):  Jairon Holguin M.D.  Referring Physician:  Luc Lynn M.D.  INDICATIONS: Unstable angina - CCS4.  HISTORY: The patient has a history of coronary artery disease. The patient  hashypertension and medication-treated dyslipidemia.  PROCEDURE:  --  Left heart catheterization with ventriculography.  --  Left coronary angiography.  --  Right coronary angiography.  TECHNIQUE: The risks and alternatives of the procedures and conscious  sedation were explained to the patient and informed consent was obtained.  Cardiac catheterization performed electively.  Local anesthetic given. Right radial artery access. A 6FR PRELUDE KIT was  inserted in the vessel. Left heart catheterization. Ventriculography was  performed. A 5FR FR4.0 EXPO catheter was utilized. Left coronary artery  angiography. The vessel was injected utilizing a 5FR FL3.5 EXPO catheter.  Right coronary artery angiography. The vessel was injected utilizing a 5FR  FR4.0 EXPO catheter. RADIATION EXPOSURE: 1.1 min.  CONTRAST GIVEN: Omnipaque 55 ml.  MEDICATIONS GIVEN: Midazolam, 1 mg, IV. Fentanyl, 25 mcg, IV. Verapamil  (Isoptin, Calan, Covera), 2.5 mg, IA. Heparin, 3000 units, IA.  VENTRICLES: Global left ventricular function was moderately depressed. EF  estimated was 40 %.  CORONARY VESSELS: The coronary circulation is right dominant.  LM:   --  LM: Normal.  LAD:   --  Proximal LAD: There was a 50 % stenosis.  CX:   --  Circumflex: Normal.  RCA:   --  Mid RCA: There was a 40 % stenosis.  --  Distal RCA: There was a 50 % stenosis.  COMPLICATIONS: There were no complications.  DIAGNOSTIC RECOMMENDATIONS: The patient should continue with the present  medications.  Prepared and signed by  Jairon Holguin M.D.  Signed 2017 12:20:13  HEMODYNAMIC TABLES  Pressures:  Baseline/ Room Air  Pressures:  - HR: 78  Pressures:  - Rhythm:  Pressures:  -- Aortic Pressure (S/D/M): --/--/99  Pressures:  -- Left Ventricle (s/edp): 157/39/--  Outputs:  Baseline/ Room Air  Outputs:  -- CALCULATIONS[age]  Outputs:  -- CALCULATIONS: Body Surface Area: 2.05  Outputs:  -- CALCULATIONS: Height in cm: 175.00  Outputs:  -- CALCULATIONS: Sex: Male  Outputs:  -- CALCULATIONS: Weight in k.40 (17 @ 21:55)  ======================================================  PAST MEDICAL & SURGICAL HISTORY:  HTN (hypertension)    CAD (coronary artery disease)  ; stent    Intracranial hemorrhage      Respiratory arrest      Myocardial infarction, unspecified MI type, unspecified artery    History of coronary artery stent placement    ====================ASSESSMENT ==============  59 yo M with HTN, CAD (s/p PCI ), HFrEF (EF severely reduced ) not on GDMT, CVA  (requiring tpA), DM presenting with chest pressure and unknown tachycardia that was shocked x1, LHC  found to have chronic total occlusion of LAD and RCA, with elevated RA and PA pressures, echo  w/ severely decreased EF 32%, s/p IABP , likely in cardiogenic shock 2/2 acute on chronic HF s/p unknown shockable rhythm.    ============================= NEUROLOGY =============================  A&Ox3, s/p extubation 11/3    Hx of CVA requiring TPA c/b ICH  -no neuro deficits known    ============================= Anxiety =============================  Takes 0.5 ativan PRN at home  -will give 0.25-0.5 ativan PRN for anxiety here  -psych consult on Monday for medical management of anxiety    ============================= RESPIRATORY =============================  Intubated for AHRF prior to [facility], extubated 11/3  - wean O2 as tolerated, currently on NC  - trend ABGs  - continue to monitor SpO2 with goal >94%  - COVID +  -watch off abx; vanc and cefepime ()  -MRSA swab neg    #R sided opacification on CXR   -improved today 11/3  -Possibly iso aspiration event prior to intubation  -watch off abx; vanc and cefepime ()  -MRSA swab neg  -sputum cx  no growth    #History of PNA  Pt initially had cough, congestion, fever, was placed on antibiotics on Juan 10/29  -CXR here clear  -s/p levaquin (10/29-10/31)  -if afebrile, remains w/o leukocytosis       #Asthma (hx of respiratory failure in 2018- intubated for 20 minutes per chart review pt report)  -c/w albuterol qd  -c/ed montelukast- does not take at home  -c/w home trelegy     ============================= CARDIOVASCULAR =============================    #Cardiogenic Shock requiring IABP (-) s/p unknown shockable event due to ischemia?  -  [facility]: pLAD 100 % in-stent restenosis & mRCA, 100 %. PCWP 30. +IABP.  -  TTE: LV dilated. EF 32 %. Regional WMAs present, mod (grade 2) LV diastolic dysfunction  -per echo areas of akinesis are not new compared to 2017, but EF is severely decreased   -currently on IABP 1:1, with augmentation of 130s, Mean 120s  -c/w nipride gtt for weaning of IABP  -s/p levo  -daily CXR for IABP  -Keep right leg straight while sheaths/IABP/[place] are in position       #STEMI at OSH  -Trop 440-> 414  -EKG here w/ LBBB  -[facility] : pLAD 70 % stenosis in the ostium portion of the segment and 100 % in-stent restenosis. mRCA, 100 % stenosis. RA 23, PA 51/33, wedge 31, PA sat 54%, CI 1.5  -s/p IABP  -c/w ASA, brillinta (takes plavix at home?)  -c/w lipitor 80  -c/w heparin gtt  -TTE : severely reduced EF, no new areas of akinesis  -CT surg not recommending CABG, instead AT eval  -viability study when IABP removed    #HFrEF w/ severely reduced EF 32%  TTE : LV moderately dilated. EF 32 %. Regional wall motion abnormalities present, mod (grade 2) LV diastolic dysfunction, with indeterminant filling pressure.   TTE : LV thrombus, EF of 22% w/ global hypokinesis  -s/p lasix 40  -continue management of shock as above  -start GDMT when stable  - BNP 4168 on admission  - CXR  clear   - tele  - S/p lasix 40 on admission  -presently euvolemic w/ CVP 6  - Strict I&O  - Daily weights  - Fluid restriction  - on ozempic and dapaglifozin at home    #LV thrombus  TTE : LV thrombus, EF of 22% w/ global hypokinesis  -c/w heparin gtt    #HTN  -hold home losartan 25, HCTZ 25, coreg 25 bid for now    ============================= RENAL =============================  Baseline Cr: 1-1.22  -trend Cr    #Metabolic acidosis  Likely 2/2 lactate of 3.7, due to cardiogenic shock  Improved  -trend lactate, blood gas  -IABP     ============================= GI =============================  #Transaminitis  Likely iso cardiogenic shock/abnormal shockable event  AST/ALT: 94/50 on admission  -trend LFTs    #Diet:   -passed dysphagia screen  -will advance diet to soft and bite sized    GERD  -changed PPI to qd    #Bowel regimen:   -miralax  -senna    ============================= ENDO =============================  #Type 2 DM  A1c 8.3  -TSH 0.50    METABOLIC:  -no active issues    ============================= HEMATOLOGIC =============================  H/H stable    #DVT prophylaxis with heparin    ============================= ID =============================  #Concern for aspiration event prior to intubation  R sided opacification on CXR , clear today 11/3  -Possibly iso aspiration event prior to intubation  -pt febrile to 100.6 today ()  -repeat blood cx today   -UA  neg  -f/u CXR  -per ID, no change in plan   -monitor off vanc and cefepime ()  -MRSA swab negative  -sputum cx  no growth  -blood cx  no growth    #History of PNA  Pt initially had cough, congestion, fever, was placed on antibiotics on Juan 10/29  -CXR  clear  -s/p levaquin (10/29-10/31)    ============================= SKIN =============================  #Lines: IABP (-, Slater (-    Patient requires continuous monitoring with bedside rhythm monitoring, pulse ox monitoring, and intermittent blood gas analysis. Care plan discussed with ICU care team. Patient remained critical and at risk for life threatening decompensation.  Patient seen, examined and plan discussed with CCU team during rounds.     I have personally provided 35 minutes of critical care time excluding time spent on separate procedures, in addition to initial critical care time provided by the CICU Attending, Dr. Cunningham.    By signing my name below, I, Sonia Preciado, attest that this documentation has been prepared under the direction and in the presence of Christoph Salinas NP  Electronically signed: Sangita Freitas, 23 @ 21:28    I, Christoph Salinas NP , personally performed the services described in this documentation. all medical record entries made by the sangita were at my direction and in my presence. I have reviewed the chart and agree that the record reflects my personal performance and is accurate and complete  Electronically signed: Christoph Salinas NP

## 2023-11-04 NOTE — PROGRESS NOTE ADULT - SUBJECTIVE AND OBJECTIVE BOX
PATIENT:  LD VALENCIA  [id number]    CHIEF COMPLAINT:  Patient is a 58y old  Male who presents with a chief complaint of NSTEMI (2023 20:29)      INTERVAL HISTORY/OVERNIGHT EVENTS: Overnight, levo and prop d/daniela. Pt extubated this morning.    Patient seen and examined at bedside. Awake, alert, denied chest pain/SOB/palpitations.    MEDICATIONS:  MEDICATIONS  (STANDING):  aspirin  chewable 81 milliGRAM(s) Oral daily  atorvastatin 80 milliGRAM(s) Oral at bedtime  chlorhexidine 0.12% Liquid 15 milliLiter(s) Oral Mucosa every 12 hours  dexMEDEtomidine Infusion 0.5 MICROgram(s)/kG/Hr (11.2 mL/Hr) IV Continuous <Continuous>  dextrose 5%. 1000 milliLiter(s) (100 mL/Hr) IV Continuous <Continuous>  dextrose 5%. 1000 milliLiter(s) (50 mL/Hr) IV Continuous <Continuous>  dextrose 50% Injectable 25 Gram(s) IV Push once  dextrose 50% Injectable 25 Gram(s) IV Push once  dextrose 50% Injectable 12.5 Gram(s) IV Push once  glucagon  Injectable 1 milliGRAM(s) IntraMuscular once  heparin  Infusion 1000 Unit(s)/Hr (12 mL/Hr) IV Continuous <Continuous>  insulin glargine Injectable (LANTUS) 13 Unit(s) SubCutaneous at bedtime  insulin lispro (ADMELOG) corrective regimen sliding scale   SubCutaneous every 6 hours  levoFLOXacin  Tablet 500 milliGRAM(s) Oral every 24 hours  norepinephrine Infusion 0.05 MICROgram(s)/kG/Min (9.27 mL/Hr) IV Continuous <Continuous>  pantoprazole  Injectable 40 milliGRAM(s) IV Push every 12 hours  propofol Infusion 40 MICROgram(s)/kG/Min (21.5 mL/Hr) IV Continuous <Continuous>  ticagrelor 90 milliGRAM(s) Oral every 12 hours    MEDICATIONS  (PRN):  dextrose Oral Gel 15 Gram(s) Oral once PRN Blood Glucose LESS THAN 70 milliGRAM(s)/deciliter      ALLERGIES:  Allergies    penicillins (Unknown)    Intolerances        OBJECTIVE:  ICU Vital Signs Last 24 Hrs  T(C): 38.3 (2023 03:00), Max: 38.3 (2023 03:00)  T(F): 100.9 (2023 03:00), Max: 100.9 (2023 03:00)  HR: 102 (2023 06:00) (78 - 102)  BP: --  BP(mean): --  ABP: 130/61 (2023 06:00) (94/64 - 137/61)  ABP(mean): 96 (2023 06:00) (80 - 114)  RR: 12 (2023 06:00) (12 - 26)  SpO2: 96% (2023 06:00) (93% - 99%)    O2 Parameters below as of 2023 06:00  Patient On (Oxygen Delivery Method): nasal cannula  O2 Flow (L/min): 3      Mode: AC/ CMV (Assist Control/ Continuous Mandatory Ventilation)  RR (machine): 16  TV (machine): 500  FiO2: 50  PEEP: 5  ITime: 1  MAP: 9  PIP: 19    Adult Advanced Hemodynamics Last 24 Hrs  CVP(mm Hg): 15 (2023 07:00) (-3 - 26)  CVP(cm H2O): --  CO: --  CI: --  PA: 40/23 (2023 07:00) (22/10 - 49/32)  PA(mean): 30 (2023 07:00) (14 - 38)  PCWP: --  SVR: --  SVRI: --  PVR: --  PVRI: --  CAPILLARY BLOOD GLUCOSE      POCT Blood Glucose.: 104 mg/dL (2023 05:27)    CAPILLARY BLOOD GLUCOSE      POCT Blood Glucose.: 104 mg/dL (2023 05:27)    I&O's Summary    2023 07:01  -  2023 07:00  --------------------------------------------------------  IN: 1093.2 mL / OUT: 1941 mL / NET: -847.8 mL    2023 07:01  -  2023 14:37  --------------------------------------------------------  IN: 129 mL / OUT: 315 mL / NET: -186 mL        Daily Height in cm: 175.26 (2023 12:26)    Daily     PHYSICAL EXAMINATION:  General: WN/WD NAD  HEENT: PERRLA, moist mucous membranes  Neurology: A&Ox3, no focal deficits  Respiratory: CTA B/L, normal respiratory effort, no wheezes, crackles, rales  CV: RRR, S1S2, no murmurs, rubs or gallops  Abdominal: Soft, NT, ND  Extremities: No edema    LABS:    CBC Full  -  ( 2023 01:49 )  WBC Count : 6.46 K/uL  RBC Count : 4.69 M/uL  Hemoglobin : 13.2 g/dL  Hematocrit : 39.4 %  Platelet Count - Automated : 136 K/uL  Mean Cell Volume : 84.0 fl  Mean Cell Hemoglobin : 28.1 pg  Mean Cell Hemoglobin Concentration : 33.5 gm/dL  Auto Neutrophil # : 4.41 K/uL  Auto Lymphocyte # : 1.38 K/uL  Auto Monocyte # : 0.49 K/uL  Auto Eosinophil # : 0.14 K/uL  Auto Basophil # : 0.02 K/uL  Auto Neutrophil % : 68.2 %  Auto Lymphocyte % : 21.4 %  Auto Monocyte % : 7.6 %  Auto Eosinophil % : 2.2 %  Auto Basophil % : 0.3 %        139  |  109<H>  |  14  ----------------------------<  105<H>  3.9   |  20<L>  |  1.02    Ca    8.5      2023 01:49  Phos  2.4       Mg     2.2         TPro  5.8<L>  /  Alb  2.9<L>  /  TBili  0.4  /  DBili  x   /  AST  70<H>  /  ALT  53<H>  /  AlkPhos  42          Urinalysis Basic - ( 2023 03:58 )    Color: Yellow / Appearance: Turbid / S.030 / pH: x  Gluc: x / Ketone: 15 mg/dL  / Bili: Negative / Urobili: 1.0 mg/dL   Blood: x / Protein: 30 mg/dL / Nitrite: Negative   Leuk Esterase: Moderate / RBC: 91 /HPF /  /HPF   Sq Epi: x / Non Sq Epi: 1 /HPF / Bacteria: Occasional /HPF        TELEMETRY:     EKG:     IMAGING:           PATIENT:  LD VALENCIA  [id number]    CHIEF COMPLAINT:  Patient is a 58y old  Male who presents with a chief complaint of NSTEMI (2023 20:29)      INTERVAL HISTORY/OVERNIGHT EVENTS: Overnight, weaned down IABP with stable numbers, however pt febrile at 100.6.     Patient seen and examined at bedside. Pt complained of anxiety and asked for medication which can keep his anxiety at bay. Denied chest pain/SOB/palpitations.    MEDICATIONS:  MEDICATIONS  (STANDING):  aspirin  chewable 81 milliGRAM(s) Oral daily  atorvastatin 80 milliGRAM(s) Oral at bedtime  chlorhexidine 0.12% Liquid 15 milliLiter(s) Oral Mucosa every 12 hours  dexMEDEtomidine Infusion 0.5 MICROgram(s)/kG/Hr (11.2 mL/Hr) IV Continuous <Continuous>  dextrose 5%. 1000 milliLiter(s) (100 mL/Hr) IV Continuous <Continuous>  dextrose 5%. 1000 milliLiter(s) (50 mL/Hr) IV Continuous <Continuous>  dextrose 50% Injectable 25 Gram(s) IV Push once  dextrose 50% Injectable 25 Gram(s) IV Push once  dextrose 50% Injectable 12.5 Gram(s) IV Push once  glucagon  Injectable 1 milliGRAM(s) IntraMuscular once  heparin  Infusion 1000 Unit(s)/Hr (12 mL/Hr) IV Continuous <Continuous>  insulin glargine Injectable (LANTUS) 13 Unit(s) SubCutaneous at bedtime  insulin lispro (ADMELOG) corrective regimen sliding scale   SubCutaneous every 6 hours  levoFLOXacin  Tablet 500 milliGRAM(s) Oral every 24 hours  norepinephrine Infusion 0.05 MICROgram(s)/kG/Min (9.27 mL/Hr) IV Continuous <Continuous>  pantoprazole  Injectable 40 milliGRAM(s) IV Push every 12 hours  propofol Infusion 40 MICROgram(s)/kG/Min (21.5 mL/Hr) IV Continuous <Continuous>  ticagrelor 90 milliGRAM(s) Oral every 12 hours    MEDICATIONS  (PRN):  dextrose Oral Gel 15 Gram(s) Oral once PRN Blood Glucose LESS THAN 70 milliGRAM(s)/deciliter      ALLERGIES:  Allergies    penicillins (Unknown)    Intolerances        OBJECTIVE:  ICU Vital Signs Last 24 Hrs  T(C): 38.3 (2023 03:00), Max: 38.3 (2023 03:00)  T(F): 100.9 (2023 03:00), Max: 100.9 (2023 03:00)  HR: 102 (2023 06:00) (78 - 102)  BP: --  BP(mean): --  ABP: 130/61 (2023 06:00) (94/64 - 137/61)  ABP(mean): 96 (2023 06:00) (80 - 114)  RR: 12 (2023 06:00) (12 - 26)  SpO2: 96% (2023 06:00) (93% - 99%)    O2 Parameters below as of 2023 06:00  Patient On (Oxygen Delivery Method): nasal cannula  O2 Flow (L/min): 3      Mode: AC/ CMV (Assist Control/ Continuous Mandatory Ventilation)  RR (machine): 16  TV (machine): 500  FiO2: 50  PEEP: 5  ITime: 1  MAP: 9  PIP: 19    Adult Advanced Hemodynamics Last 24 Hrs  CVP(mm Hg): 15 (2023 07:00) (-3 - 26)  CVP(cm H2O): --  CO: --  CI: --  PA: 40/23 (2023 07:00) (22/10 - 49/32)  PA(mean): 30 (2023 07:00) (14 - 38)  PCWP: --  SVR: --  SVRI: --  PVR: --  PVRI: --  CAPILLARY BLOOD GLUCOSE      POCT Blood Glucose.: 104 mg/dL (2023 05:27)    CAPILLARY BLOOD GLUCOSE      POCT Blood Glucose.: 104 mg/dL (2023 05:27)    I&O's Summary    2023 07:01  -  2023 07:00  --------------------------------------------------------  IN: 1216.1 mL / OUT: 1760 mL / NET: -543.9 mL    2023 08:01  -  2023 11:51  --------------------------------------------------------  IN: 118.8 mL / OUT: 650 mL / NET: -531.2 mL      Daily Height in cm: 175.26 (2023 12:26)    Daily     PHYSICAL EXAMINATION:  General: WN/WD NAD  HEENT: PERRLA, moist mucous membranes  Neurology: A&Ox3, no focal deficits  Respiratory: CTA B/L, normal respiratory effort, no wheezes, crackles, rales  CV: RRR, S1S2, no murmurs, rubs or gallops  Abdominal: Soft, NT, ND  Extremities: No edema    LABS:    CBC Full  -  ( 2023 00:59 )  WBC Count : 8.17 K/uL  RBC Count : 4.52 M/uL  Hemoglobin : 12.8 g/dL  Hematocrit : 38.3 %  Platelet Count - Automated : 128 K/uL  Mean Cell Volume : 84.7 fl  Mean Cell Hemoglobin : 28.3 pg  Mean Cell Hemoglobin Concentration : 33.4 gm/dL  Auto Neutrophil # : 5.44 K/uL  Auto Lymphocyte # : 1.97 K/uL  Auto Monocyte # : 0.56 K/uL  Auto Eosinophil # : 0.16 K/uL  Auto Basophil # : 0.01 K/uL  Auto Neutrophil % : 66.5 %  Auto Lymphocyte % : 24.1 %  Auto Monocyte % : 6.9 %  Auto Eosinophil % : 2.0 %  Auto Basophil % : 0.1 %        136  |  105  |  10  ----------------------------<  93  3.6   |  17<L>  |  1.00    Ca    8.3<L>      2023 00:59  Phos  2.4     11-  Mg     1.9     -    TPro  5.8<L>  /  Alb  3.0<L>  /  TBili  0.6  /  DBili  x   /  AST  39  /  ALT  34  /  AlkPhos  41  11-04        Urinalysis Basic - ( 2023 11:01 )    Color: Yellow / Appearance: Cloudy / S.014 / pH: x  Gluc: x / Ketone: 15 mg/dL  / Bili: Negative / Urobili: 0.2 mg/dL   Blood: x / Protein: Negative mg/dL / Nitrite: Negative   Leuk Esterase: Trace / RBC: 9 /HPF / WBC 2 /HPF   Sq Epi: x / Non Sq Epi: 0 /HPF / Bacteria: Negative /HPF        TELEMETRY:     EKG:     IMAGING:

## 2023-11-04 NOTE — PROGRESS NOTE ADULT - SUBJECTIVE AND OBJECTIVE BOX
Subjective    Patient seen and examined at bedside. Was febrile to 38.3 overnight, overall feels unwell but has no chest pain or SOB.     Current Meds:  albuterol    0.083% 2.5 milliGRAM(s) Nebulizer daily  aspirin  chewable 81 milliGRAM(s) Oral daily  atorvastatin 80 milliGRAM(s) Oral at bedtime  budesonide  80 MICROgram(s)/formoterol 4.5 MICROgram(s) Inhaler 2 Puff(s) Inhalation two times a day  chlorhexidine 2% Cloths 1 Application(s) Topical daily  dextrose 50% Injectable 25 Gram(s) IV Push once  dextrose 50% Injectable 12.5 Gram(s) IV Push once  dextrose 50% Injectable 25 Gram(s) IV Push once  dextrose Oral Gel 15 Gram(s) Oral once PRN  glucagon  Injectable 1 milliGRAM(s) IntraMuscular once  insulin glargine Injectable (LANTUS) 6 Unit(s) SubCutaneous at bedtime  insulin lispro (ADMELOG) corrective regimen sliding scale   SubCutaneous three times a day before meals  insulin lispro (ADMELOG) corrective regimen sliding scale   SubCutaneous at bedtime  LORazepam     Tablet 0.25 milliGRAM(s) Oral once  nitroprusside Infusion 0.5 MICROgram(s)/kG/Min IV Continuous <Continuous>  pantoprazole    Tablet 40 milliGRAM(s) Oral before breakfast  polyethylene glycol 3350 17 Gram(s) Oral daily  senna 2 Tablet(s) Oral at bedtime  spironolactone 25 milliGRAM(s) Oral daily  ticagrelor 90 milliGRAM(s) Oral every 12 hours  tiotropium 2.5 MICROgram(s) Inhaler 2 Puff(s) Inhalation daily      Vitals:  T(F): 100.9 (11-04), Max: 100.9 (11-04)  HR: 104 (11-04) (84 - 104)  BP: --  RR: 13 (11-04)  SpO2: 95% (11-04)  I&O's Summary    03 Nov 2023 07:01  -  04 Nov 2023 07:00  --------------------------------------------------------  IN: 1186.4 mL / OUT: 1760 mL / NET: -573.6 mL    Physical Exam:  General: No acute distress; well appearing  Eyes: PERRL, EOMI, pink conjunctiva  HEENT: Normal oral mucosa  Cardiovascular: RRR, S1, S2, no murmurs, rubs, or gallops; no edema; no JVD  Respiratory: Clear to auscultation bilaterally, speaking full sentences  Gastrointestinal: soft, non-tender, non-distended with normal bowel sounds  Extremities: 2+ pulses, no clubbing; no joint deformity, or edema  Neurologic: AAOx3,  Non-focal  Skin: No rashes, ecchymoses, or cyanosis                          12.8   8.17  )-----------( 128      ( 04 Nov 2023 00:59 )             38.3     11-04    136  |  105  |  10  ----------------------------<  93  3.6   |  17<L>  |  1.00    Ca    8.3<L>      04 Nov 2023 00:59  Phos  2.4     11-04  Mg     1.9     11-04    TPro  5.8<L>  /  Alb  3.0<L>  /  TBili  0.6  /  DBili  x   /  AST  39  /  ALT  34  /  AlkPhos  41  11-04    PTT - ( 04 Nov 2023 00:59 )  PTT:70.2 sec  CARDIAC MARKERS ( 03 Nov 2023 01:49 )  1226 ng/L / x     / x     / x     / x     / x      CARDIAC MARKERS ( 01 Nov 2023 07:51 )  414 ng/L / x     / x     / x     / x     / x      CARDIAC MARKERS ( 01 Nov 2023 06:14 )  440 ng/L / x     / x     / x     / x     / x

## 2023-11-04 NOTE — PROGRESS NOTE ADULT - PROBLEM SELECTOR PLAN 2
- pLAD 70 % stenosis in the ostium portion of the segment and 100 % in-stent restenosis. mRCA, 100 % stenosis.   - Currently ASA, Atorvastatin 80mg and Brilinta  - Pending viability study

## 2023-11-04 NOTE — PROGRESS NOTE ADULT - SUBJECTIVE AND OBJECTIVE BOX
LD VALENCIA  58y Male  MRN:[id number]    Patient is a 58y old  Male who presents with a chief complaint of NSTEMI (01 Nov 2023 20:29)    HPI:  59yo M w/ hx HTN, CAD w/ 1 stent in 2009, ICH (2008) presenting with abn ekg. Patient presented to [facility] where he was found to have STEMI, recommended to get cath however patient did not want to get it there so it left and came here.  Patient initially had cough, congestion, fever, was placed on antibiotics on Sunday.  Started feeling nauseous and had a presyncopal event after which he presented to ED last night.  Had chest pain as well.  Chest pain is midsternal.  Not currently having chest pain.  Received 4 aspirin 30 min pta. (01 Nov 2023 15:11)      Patient seen and evaluated at bedside in CCU. interval events noted    Interval HPI: febrile     PAST MEDICAL & SURGICAL HISTORY:  HTN (hypertension)      CAD (coronary artery disease)  2009; stent      Intracranial hemorrhage  2008      Respiratory arrest  december 1st      Myocardial infarction, unspecified MI type, unspecified artery      History of coronary artery stent placement          REVIEW OF SYSTEMS:  as per hpi     VITALS:   ICU Vital Signs Last 24 Hrs  T(C): 38.3 (04 Nov 2023 20:00), Max: 38.3 (04 Nov 2023 03:00)  T(F): 100.9 (04 Nov 2023 20:00), Max: 100.9 (04 Nov 2023 03:00)  HR: 106 (04 Nov 2023 20:00) (92 - 111)  BP: --  BP(mean): --  ABP: 144/71 (04 Nov 2023 20:00) (105/66 - 164/79)  ABP(mean): 101 (04 Nov 2023 20:00) (85 - 116)  RR: 21 (04 Nov 2023 20:00) (8 - 26)  SpO2: 94% (04 Nov 2023 20:00) (93% - 96%)    O2 Parameters below as of 04 Nov 2023 20:00  Patient On (Oxygen Delivery Method): nasal cannula  O2 Flow (L/min): 3              PHYSICAL EXAM:  GENERAL: NAD, well-developed  HEAD:  Atraumatic, Normocephalic  EYES: EOMI, PERRLA, conjunctiva and sclera clear  NECK: Supple, No JVD  CHEST/LUNG: Clear to auscultation bilaterally; No wheeze  HEART: Regular rate and rhythm; No murmurs, rubs, or gallops  ABDOMEN: Soft, Nontender, Nondistended; Bowel sounds present  EXTREMITIES:  2+ Peripheral Pulses, No clubbing, cyanosis, or edema  PSYCH: AAOx3  NEUROLOGY: non-focal  SKIN: No rashes or lesions    Consultant(s) Notes Reviewed:  [x ] YES  [ ] NO  Care Discussed with Consultants/Other Providers [ x] YES  [ ] NO    MEDS:   MEDICATIONS  (STANDING):  acetaminophen   IVPB .. 1000 milliGRAM(s) IV Intermittent once  albuterol    0.083% 2.5 milliGRAM(s) Nebulizer daily  aspirin  chewable 81 milliGRAM(s) Oral daily  atorvastatin 80 milliGRAM(s) Oral at bedtime  budesonide  80 MICROgram(s)/formoterol 4.5 MICROgram(s) Inhaler 2 Puff(s) Inhalation two times a day  chlorhexidine 2% Cloths 1 Application(s) Topical daily  dextrose 50% Injectable 25 Gram(s) IV Push once  dextrose 50% Injectable 12.5 Gram(s) IV Push once  dextrose 50% Injectable 25 Gram(s) IV Push once  glucagon  Injectable 1 milliGRAM(s) IntraMuscular once  heparin  Infusion 950 Unit(s)/Hr (11 mL/Hr) IV Continuous <Continuous>  hydrALAZINE 10 milliGRAM(s) Oral every 8 hours  insulin glargine Injectable (LANTUS) 8 Unit(s) SubCutaneous at bedtime  insulin lispro (ADMELOG) corrective regimen sliding scale   SubCutaneous three times a day before meals  insulin lispro (ADMELOG) corrective regimen sliding scale   SubCutaneous at bedtime  nitroprusside Infusion 0.5 MICROgram(s)/kG/Min (7.42 mL/Hr) IV Continuous <Continuous>  pantoprazole    Tablet 40 milliGRAM(s) Oral before breakfast  polyethylene glycol 3350 17 Gram(s) Oral daily  senna 2 Tablet(s) Oral at bedtime  spironolactone 25 milliGRAM(s) Oral daily  ticagrelor 90 milliGRAM(s) Oral every 12 hours  tiotropium 2.5 MICROgram(s) Inhaler 2 Puff(s) Inhalation daily    MEDICATIONS  (PRN):  dextrose Oral Gel 15 Gram(s) Oral once PRN Blood Glucose LESS THAN 70 milliGRAM(s)/deciliter      ALLERGIES:  penicillins (Unknown)      LABS:                                  12.8   8.17  )-----------( 128      ( 04 Nov 2023 00:59 )             38.3   11-04    136  |  105  |  10  ----------------------------<  93  3.6   |  17<L>  |  1.00    Ca    8.3<L>      04 Nov 2023 00:59  Phos  2.4     11-04  Mg     1.9     11-04    TPro  5.8<L>  /  Alb  3.0<L>  /  TBili  0.6  /  DBili  x   /  AST  39  /  ALT  34  /  AlkPhos  41  11-04       < from: Xray Chest 1 View- PORTABLE-Urgent (11.01.23 @ 07:42) >    IMPRESSION:  Clear lungs.    ---End of Report ---        < end of copied text >  < from: TTE W or WO Ultrasound Enhancing Agent (11.01.23 @ 10:23) >  _____________________________     CONCLUSIONS:      1. Left ventricular cavity is moderately dilated. Left ventricular wall thickness is normal. Left ventricular systolic function is severely decreased with an ejection fraction of 32 % by Chinchilla's method of disks. Regional wall motion abnormalities present.   2. Multiple segmental abnormalities exist. See findings.   3. There is moderate (grade 2) left ventricular diastolic dysfunction, with indeterminant filling pressure.   4. Normal right ventricular cavity size, wall thickness, and systolic function.   5. No significant valvular disease.   6. No pericardial effusion seen.   7. Compared to the transthoracic echocardiogram performed on 1/25/2017 the areas of akinesis are unchanged but there has been a decline in LV systolic function with new areas of hypokinesis.    __________________________________________________________________    < end of copied text >  < from: TTE Limited W or WO Ultrasound Enhancing Agent (11.02.23 @ 07:41) >  __________________________     CONCLUSIONS:      1. After obtaining consent, Definity ultrasound enhancing agent was given for enhanced left ventricular opacification and improved delineation of the left ventricular endocardial borders. Left ventricular systolic function is severely decreased with a calculated ejection fraction of 22 % by the Chinchilla's biplane method of disks. There is a left ventricular thrombus.   2. Findings were discussed with Litzy BOSS on 11/2/2023 at 8.49am.   3. There is a left ventricular thrombus.    _________________________________________________________________    < end of copied text >

## 2023-11-04 NOTE — CHART NOTE - NSCHARTNOTEFT_GEN_A_CORE
Spoke to ID fellow Jose Capellan via TEAMS. Despite the fever, there is no change in plan per Infectious Disease and will continue to monitor. The fevers may be in the setting of COVID infection.    Yojana López, PGY-2  Internal Medicine Spoke to ID fellow Jose Capellan via TEAMS. Despite the fever, there is no change in plan per Infectious Disease and will continue to monitor. The fevers may be in the setting of the known COVID infection. Repeat blood cultures were drawn today. UA and CXR negative. Blood cx and sputum from 11/2 negative.    Yojana López, PGY-2  Internal Medicine

## 2023-11-04 NOTE — PROGRESS NOTE ADULT - ATTENDING COMMENTS
Patient is a 59 yo M with HTN, CAD (s/p PCI 2008), HFrEF (EF severely reduced 2018) not on GDMT, CVA 2018 (requiring tpA), DM presenting with chest pressure - with course c/b acute systolic heart failure exacerbation requiring urgent intubation prior to his right/left heart cath    LHC 11/1 found to have chronic total occlusion of LAD and RCA  RHC with elevated RA and PA pressures and CI 1.14 - s/p IABP for mechanical support  SHOCK call was initiated - based upon objective data at that time it was decided to continue with the current course - intubation/IABP/diuresis  echo 11/1 w/ severely decreased EF 32%, s/p IABP 11/1, likely in cardiogenic shock 2/2 acute on chronic HF   pt was found to have a COVID infection simultaneously as well  MRI viability study will be done once the IABP is removed  SBT trial today to extubate    Patient is critically ill, requiring critical care services. Despite the current condition, the patient is at a high risk of clinical and hemodynamic demise Patient is a 57 yo M with HTN, CAD (s/p PCI 2008), HFrEF (EF severely reduced 2018) not on GDMT, CVA 2018 (requiring tpA), DM presenting with chest pressure - with course c/b acute systolic heart failure exacerbation requiring urgent intubation prior to his right/left heart cath    LHC 11/1 found to have chronic total occlusion of LAD and RCA  RHC with elevated RA and PA pressures and CI 1.14 - s/p IABP for mechanical support  SHOCK call was initiated - based upon objective data at that time it was decided to continue with the current course - intubation/IABP/diuresis  echo 11/1 w/ severely decreased EF 32%, s/p IABP 11/1, likely in cardiogenic shock 2/2 acute on chronic HF   pt was found to have a COVID infection simultaneously as well  MRI viability study will be done once the IABP is removed  SBT trial today to extubate - succesful extubation 11/3    Patient is critically ill, requiring critical care services. Despite the current condition, the patient is at a high risk of clinical and hemodynamic demise

## 2023-11-04 NOTE — PROGRESS NOTE ADULT - ASSESSMENT
59 yo M with HTN, CAD (s/p PCI 2008), HFrEF (EF severely reduced 2018) not on GDMT, CVA 2018 (requiring tpA), DM presenting with chest pressure and unknown tachycardia that was shocked x1, C 11/1 found to have chronic total occlusion of LAD and RCA, with elevated RA and PA pressures, echo 11/1 w/ severely decreased EF 32%, s/p IABP 11/1, likely in cardiogenic shock 2/2 acute on chronic HF s/p unknown shockable rhythm.    NEUROLOGY  A&Ox3, s/p extubation 11/3    Hx of CVA requiring TPA c/b ICH  -no neuro deficits known    RESPIRATORY  Intubated for AHRF prior to [facility], extubated 11/3  - wean O2 as tolerated, currently on NC  - trend ABGs  - continue to monitor SpO2 with goal >94%  - COVID +  -watch off abx; vanc and cefepime (11/2)  -MRSA swab neg    #R sided opacification on CXR 11/2  -improved today 11/3  -Possibly iso aspiration event prior to intubation  -watch off abx; vanc and cefepime (11/2)  -MRSA swab neg  -sputum cx 11/2 no growth    #History of PNA  Pt initially had cough, congestion, fever, was placed on antibiotics on Juan 10/29  -CXR here clear  -s/p levaquin (10/29-10/31)  -if afebrile, remains w/o leukocytosis       #Asthma (hx of respiratory failure in 2018- intubated for 20 minutes per chart review pt report)  -c/w albuterol qd  -c/ed montelukast- does not take at home  -resume home tregy     CARDIOVASCULAR    #Cardiogenic Shock requiring IABP (11/1-) s/p unknown shockable event due to ischemia?  - 11/1 [facility]: pLAD 100 % in-stent restenosis & mRCA, 100 %. PCWP 30. +IABP.  - 11/1 TTE: LV dilated. EF 32 %. Regional WMAs present, mod (grade 2) LV diastolic dysfunction  -per echo areas of akinesis are not new compared to 2017, but EF is severely decreased   -currently on IABP 1:1, with augmentation of 130s, Mean 120s  -start nipride gtt for weaning of IABP  -wean off levo  -daily CXR for IABP  -Keep right leg straight while sheaths/IABP/[place] are in position       #STEMI at OSH  -Trop 440-> 414  -EKG here w/ LBBB  -LHC 11/1: pLAD 70 % stenosis in the ostium portion of the segment and 100 % in-stent restenosis. mRCA, 100 % stenosis. RA 23, PA 51/33, wedge 31, PA sat 54%, CI 1.5  -s/p IABP  -c/w ASA, brillinta (takes plavix at home?)  -c/w lipitor 80  -c/w heparin gtt  -TTE 11/1: severely reduced EF, no new areas of akinesis  -CT surg not recommending CABG, instead AT eval  -viability study when IABP removed    #HFrEF w/ severely reduced EF 32%  TTE 11/1: LV moderately dilated. EF 32 %. Regional wall motion abnormalities present, mod (grade 2) LV diastolic dysfunction, with indeterminant filling pressure.   TTE 11/2: LV thrombus, EF of 22% w/ global hypokinesis  -s/p lasix 40  -continue management of shock as above  -start GDMT when stable  - BNP 4168 on admission  - CXR 11/1 clear   - tele  - S/p lasix 40 on admission  -presently euvolemic w/ CVP 6  - Strict I&O  - Daily weights  - Fluid restriction  - on ozempic and dapaglifozin at home    #LV thrombus  TTE 11/2: LV thrombus, EF of 22% w/ global hypokinesis  -c/w heparin gtt    #HTN  -hold home losartan 25, HCTZ 25, coreg 25 bid for now    RENAL:  Baseline Cr: 1-1.22  -trend Cr    #Metabolic acidosis  Likely 2/2 lactate of 3.7, due to cardiogenic shock  Improved  -trend lactate, blood gas  -IABP     GI:  #Transaminitis  Likely iso cardiogenic shock/abnormal shockable event  AST/ALT: 94/50 on admission  -trend LFTs    #Diet:   -passed dysphagia screen  -start pureed diet    GERD  -changed PPI to qd    #Bowel regimen:   -miralax  -senna    ENDO:  #Type 2 DM  A1c 8.3  -TSH 0.50    METABOLIC:  -no active issues    HEMATOLOGIC:  H/H stable    #DVT prophylaxis with heparin    ID:  #Concern for aspiration event prior to intubation  R sided opacification on CXR 11/2, clear today 11/3  -Possibly iso aspiration event prior to intubation  -monitor off vanc and cefepime (11/2)  -MRSA swab  -sputum cx    #History of PNA  Pt initially had cough, congestion, fever, was placed on antibiotics on Juan 10/29  -CXR 11/1 clear  -s/p levaquin (10/29-10/31)    SKIN:  #Lines: IABP (11/1-, [place] (11/1-     59 yo M with HTN, CAD (s/p PCI 2008), HFrEF (EF severely reduced 2018) not on GDMT, CVA 2018 (requiring tpA), DM presenting with chest pressure and unknown tachycardia that was shocked x1, C 11/1 found to have chronic total occlusion of LAD and RCA, with elevated RA and PA pressures, echo 11/1 w/ severely decreased EF 32%, s/p IABP 11/1, likely in cardiogenic shock 2/2 acute on chronic HF s/p unknown shockable rhythm.    NEUROLOGY  A&Ox3, s/p extubation 11/3    Hx of CVA requiring TPA c/b ICH  -no neuro deficits known    Anxiety  Takes 0.5 ativan PRN at home  -will give 0.25-0.5 ativan PRN for anxiety here  -psych consult on Monday for medical management of anxiety    RESPIRATORY  Intubated for AHRF prior to [facility], extubated 11/3  - wean O2 as tolerated, currently on NC  - trend ABGs  - continue to monitor SpO2 with goal >94%  - COVID +  -watch off abx; vanc and cefepime (11/2)  -MRSA swab neg    #R sided opacification on CXR 11/2  -improved today 11/3  -Possibly iso aspiration event prior to intubation  -watch off abx; vanc and cefepime (11/2)  -MRSA swab neg  -sputum cx 11/2 no growth    #History of PNA  Pt initially had cough, congestion, fever, was placed on antibiotics on Juan 10/29  -CXR here clear  -s/p levaquin (10/29-10/31)  -if afebrile, remains w/o leukocytosis       #Asthma (hx of respiratory failure in 2018- intubated for 20 minutes per chart review pt report)  -c/w albuterol qd  -c/ed montelukast- does not take at home  -c/w home trelegy     CARDIOVASCULAR    #Cardiogenic Shock requiring IABP (11/1-) s/p unknown shockable event due to ischemia?  - 11/1 [facility]: pLAD 100 % in-stent restenosis & mRCA, 100 %. PCWP 30. +IABP.  - 11/1 TTE: LV dilated. EF 32 %. Regional WMAs present, mod (grade 2) LV diastolic dysfunction  -per echo areas of akinesis are not new compared to 2017, but EF is severely decreased   -currently on IABP 1:1, with augmentation of 130s, Mean 120s  -c/w nipride gtt for weaning of IABP  -s/p levo  -daily CXR for IABP  -Keep right leg straight while sheaths/IABP/[place] are in position       #STEMI at OSH  -Trop 440-> 414  -EKG here w/ LBBB  -LHC 11/1: pLAD 70 % stenosis in the ostium portion of the segment and 100 % in-stent restenosis. mRCA, 100 % stenosis. RA 23, PA 51/33, wedge 31, PA sat 54%, CI 1.5  -s/p IABP  -c/w ASA, brillinta (takes plavix at home?)  -c/w lipitor 80  -c/w heparin gtt  -TTE 11/1: severely reduced EF, no new areas of akinesis  -CT surg not recommending CABG, instead AT eval  -viability study when IABP removed    #HFrEF w/ severely reduced EF 32%  TTE 11/1: LV moderately dilated. EF 32 %. Regional wall motion abnormalities present, mod (grade 2) LV diastolic dysfunction, with indeterminant filling pressure.   TTE 11/2: LV thrombus, EF of 22% w/ global hypokinesis  -s/p lasix 40  -continue management of shock as above  -start GDMT when stable  - BNP 4168 on admission  - CXR 11/1 clear   - tele  - S/p lasix 40 on admission  -presently euvolemic w/ CVP 6  - Strict I&O  - Daily weights  - Fluid restriction  - on ozempic and dapaglifozin at home    #LV thrombus  TTE 11/2: LV thrombus, EF of 22% w/ global hypokinesis  -c/w heparin gtt    #HTN  -hold home losartan 25, HCTZ 25, coreg 25 bid for now    RENAL:  Baseline Cr: 1-1.22  -trend Cr    #Metabolic acidosis  Likely 2/2 lactate of 3.7, due to cardiogenic shock  Improved  -trend lactate, blood gas  -IABP     GI:  #Transaminitis  Likely iso cardiogenic shock/abnormal shockable event  AST/ALT: 94/50 on admission  -trend LFTs    #Diet:   -passed dysphagia screen  -will advance diet to soft and bite sized    GERD  -changed PPI to qd    #Bowel regimen:   -miralax  -senna    ENDO:  #Type 2 DM  A1c 8.3  -TSH 0.50    METABOLIC:  -no active issues    HEMATOLOGIC:  H/H stable    #DVT prophylaxis with heparin    ID:  #Concern for aspiration event prior to intubation  R sided opacification on CXR 11/2, clear today 11/3  -Possibly iso aspiration event prior to intubation  -pt febrile to 100.6 today (11/4)  -repeat blood cx today 11/4  -UA 11/4 neg  -f/u CXR  -per ID, no change in plan   -monitor off vanc and cefepime (11/2)  -MRSA swab negative  -sputum cx 11/2 no growth  -blood cx 11/2 no growth    #History of PNA  Pt initially had cough, congestion, fever, was placed on antibiotics on Juan 10/29  -CXR 11/1 clear  -s/p levaquin (10/29-10/31)    SKIN:  #Lines: IABP (11/1-, [place] (11/1-

## 2023-11-04 NOTE — SWALLOW BEDSIDE ASSESSMENT ADULT - SLP PERTINENT HISTORY OF CURRENT PROBLEM
59 yo M with HTN, CAD (s/p PCI 2008), HFrEF (EF severely reduced 2018) not on GDMT, CVA 2018 (requiring tpA), DM presenting with chest pressure and unknown tachycardia that was shocked x1, [facility] 11/1 found to have chronic total occlusion of LAD and RCA, with elevated RA and PA pressures, echo 11/1 w/ severely decreased EF 32%, s/p IABP 11/1, likely in cardiogenic shock 2/2 acute on chronic HF s/p unknown shockable rhythm. Extubated 11/3.

## 2023-11-04 NOTE — SWALLOW BEDSIDE ASSESSMENT ADULT - SLP GENERAL OBSERVATIONS
Patient encountered awake and alert in semi-reclined position, +IABP, +3L/NC, A&Ox4. Bed elevated via reverse Trendelenburg with RN clearance. Vocal quality WNL. Able to follow commands and make wants/needs known.

## 2023-11-05 DIAGNOSIS — A41.9 SEPSIS, UNSPECIFIED ORGANISM: ICD-10-CM

## 2023-11-05 LAB
ALBUMIN SERPL ELPH-MCNC: 3 G/DL — LOW (ref 3.3–5)
ALBUMIN SERPL ELPH-MCNC: 3 G/DL — LOW (ref 3.3–5)
ALBUMIN SERPL ELPH-MCNC: 3.1 G/DL — LOW (ref 3.3–5)
ALBUMIN SERPL ELPH-MCNC: 3.1 G/DL — LOW (ref 3.3–5)
ALP SERPL-CCNC: 42 U/L — SIGNIFICANT CHANGE UP (ref 40–120)
ALP SERPL-CCNC: 42 U/L — SIGNIFICANT CHANGE UP (ref 40–120)
ALP SERPL-CCNC: 46 U/L — SIGNIFICANT CHANGE UP (ref 40–120)
ALP SERPL-CCNC: 46 U/L — SIGNIFICANT CHANGE UP (ref 40–120)
ALT FLD-CCNC: 33 U/L — SIGNIFICANT CHANGE UP (ref 10–45)
ALT FLD-CCNC: 33 U/L — SIGNIFICANT CHANGE UP (ref 10–45)
ALT FLD-CCNC: 49 U/L — HIGH (ref 10–45)
ALT FLD-CCNC: 49 U/L — HIGH (ref 10–45)
ANION GAP SERPL CALC-SCNC: 11 MMOL/L — SIGNIFICANT CHANGE UP (ref 5–17)
ANION GAP SERPL CALC-SCNC: 11 MMOL/L — SIGNIFICANT CHANGE UP (ref 5–17)
ANION GAP SERPL CALC-SCNC: 14 MMOL/L — SIGNIFICANT CHANGE UP (ref 5–17)
ANION GAP SERPL CALC-SCNC: 14 MMOL/L — SIGNIFICANT CHANGE UP (ref 5–17)
APTT BLD: 46 SEC — HIGH (ref 24.5–35.6)
APTT BLD: 46 SEC — HIGH (ref 24.5–35.6)
APTT BLD: 63.3 SEC — HIGH (ref 24.5–35.6)
APTT BLD: 63.3 SEC — HIGH (ref 24.5–35.6)
APTT BLD: 66.8 SEC — HIGH (ref 24.5–35.6)
APTT BLD: 66.8 SEC — HIGH (ref 24.5–35.6)
AST SERPL-CCNC: 46 U/L — HIGH (ref 10–40)
AST SERPL-CCNC: 46 U/L — HIGH (ref 10–40)
AST SERPL-CCNC: 69 U/L — HIGH (ref 10–40)
AST SERPL-CCNC: 69 U/L — HIGH (ref 10–40)
BASE EXCESS BLDMV CALC-SCNC: 0.6 MMOL/L — SIGNIFICANT CHANGE UP (ref -3–3)
BASE EXCESS BLDMV CALC-SCNC: 0.6 MMOL/L — SIGNIFICANT CHANGE UP (ref -3–3)
BASE EXCESS BLDMV CALC-SCNC: 0.7 MMOL/L — SIGNIFICANT CHANGE UP (ref -3–3)
BASE EXCESS BLDMV CALC-SCNC: 0.7 MMOL/L — SIGNIFICANT CHANGE UP (ref -3–3)
BASE EXCESS BLDMV CALC-SCNC: 1.3 MMOL/L — SIGNIFICANT CHANGE UP (ref -3–3)
BASE EXCESS BLDMV CALC-SCNC: 1.3 MMOL/L — SIGNIFICANT CHANGE UP (ref -3–3)
BASE EXCESS BLDV CALC-SCNC: 1 MMOL/L — SIGNIFICANT CHANGE UP (ref -2–3)
BASE EXCESS BLDV CALC-SCNC: 1 MMOL/L — SIGNIFICANT CHANGE UP (ref -2–3)
BASOPHILS # BLD AUTO: 0.02 K/UL — SIGNIFICANT CHANGE UP (ref 0–0.2)
BASOPHILS NFR BLD AUTO: 0.2 % — SIGNIFICANT CHANGE UP (ref 0–2)
BILIRUB SERPL-MCNC: 0.5 MG/DL — SIGNIFICANT CHANGE UP (ref 0.2–1.2)
BILIRUB SERPL-MCNC: 0.5 MG/DL — SIGNIFICANT CHANGE UP (ref 0.2–1.2)
BILIRUB SERPL-MCNC: 0.7 MG/DL — SIGNIFICANT CHANGE UP (ref 0.2–1.2)
BILIRUB SERPL-MCNC: 0.7 MG/DL — SIGNIFICANT CHANGE UP (ref 0.2–1.2)
BUN SERPL-MCNC: 11 MG/DL — SIGNIFICANT CHANGE UP (ref 7–23)
BUN SERPL-MCNC: 11 MG/DL — SIGNIFICANT CHANGE UP (ref 7–23)
BUN SERPL-MCNC: 14 MG/DL — SIGNIFICANT CHANGE UP (ref 7–23)
BUN SERPL-MCNC: 14 MG/DL — SIGNIFICANT CHANGE UP (ref 7–23)
CA-I SERPL-SCNC: 1.16 MMOL/L — SIGNIFICANT CHANGE UP (ref 1.15–1.33)
CA-I SERPL-SCNC: 1.16 MMOL/L — SIGNIFICANT CHANGE UP (ref 1.15–1.33)
CALCIUM SERPL-MCNC: 8.5 MG/DL — SIGNIFICANT CHANGE UP (ref 8.4–10.5)
CALCIUM SERPL-MCNC: 8.5 MG/DL — SIGNIFICANT CHANGE UP (ref 8.4–10.5)
CALCIUM SERPL-MCNC: 8.8 MG/DL — SIGNIFICANT CHANGE UP (ref 8.4–10.5)
CALCIUM SERPL-MCNC: 8.8 MG/DL — SIGNIFICANT CHANGE UP (ref 8.4–10.5)
CHLORIDE BLDV-SCNC: 101 MMOL/L — SIGNIFICANT CHANGE UP (ref 96–108)
CHLORIDE BLDV-SCNC: 101 MMOL/L — SIGNIFICANT CHANGE UP (ref 96–108)
CHLORIDE SERPL-SCNC: 101 MMOL/L — SIGNIFICANT CHANGE UP (ref 96–108)
CO2 BLDMV-SCNC: 25 MMOL/L — SIGNIFICANT CHANGE UP (ref 21–29)
CO2 BLDMV-SCNC: 25 MMOL/L — SIGNIFICANT CHANGE UP (ref 21–29)
CO2 BLDMV-SCNC: 26 MMOL/L — SIGNIFICANT CHANGE UP (ref 21–29)
CO2 BLDMV-SCNC: 26 MMOL/L — SIGNIFICANT CHANGE UP (ref 21–29)
CO2 BLDMV-SCNC: 27 MMOL/L — SIGNIFICANT CHANGE UP (ref 21–29)
CO2 BLDMV-SCNC: 27 MMOL/L — SIGNIFICANT CHANGE UP (ref 21–29)
CO2 BLDV-SCNC: 26 MMOL/L — SIGNIFICANT CHANGE UP (ref 22–26)
CO2 BLDV-SCNC: 26 MMOL/L — SIGNIFICANT CHANGE UP (ref 22–26)
CO2 SERPL-SCNC: 18 MMOL/L — LOW (ref 22–31)
CO2 SERPL-SCNC: 18 MMOL/L — LOW (ref 22–31)
CO2 SERPL-SCNC: 21 MMOL/L — LOW (ref 22–31)
CO2 SERPL-SCNC: 21 MMOL/L — LOW (ref 22–31)
CREAT SERPL-MCNC: 1.19 MG/DL — SIGNIFICANT CHANGE UP (ref 0.5–1.3)
CREAT SERPL-MCNC: 1.19 MG/DL — SIGNIFICANT CHANGE UP (ref 0.5–1.3)
CREAT SERPL-MCNC: 1.37 MG/DL — HIGH (ref 0.5–1.3)
CREAT SERPL-MCNC: 1.37 MG/DL — HIGH (ref 0.5–1.3)
EGFR: 59 ML/MIN/1.73M2 — LOW
EGFR: 59 ML/MIN/1.73M2 — LOW
EGFR: 70 ML/MIN/1.73M2 — SIGNIFICANT CHANGE UP
EGFR: 70 ML/MIN/1.73M2 — SIGNIFICANT CHANGE UP
EOSINOPHIL # BLD AUTO: 0.11 K/UL — SIGNIFICANT CHANGE UP (ref 0–0.5)
EOSINOPHIL # BLD AUTO: 0.11 K/UL — SIGNIFICANT CHANGE UP (ref 0–0.5)
EOSINOPHIL # BLD AUTO: 0.17 K/UL — SIGNIFICANT CHANGE UP (ref 0–0.5)
EOSINOPHIL # BLD AUTO: 0.17 K/UL — SIGNIFICANT CHANGE UP (ref 0–0.5)
EOSINOPHIL NFR BLD AUTO: 1.1 % — SIGNIFICANT CHANGE UP (ref 0–6)
EOSINOPHIL NFR BLD AUTO: 1.1 % — SIGNIFICANT CHANGE UP (ref 0–6)
EOSINOPHIL NFR BLD AUTO: 1.6 % — SIGNIFICANT CHANGE UP (ref 0–6)
EOSINOPHIL NFR BLD AUTO: 1.6 % — SIGNIFICANT CHANGE UP (ref 0–6)
GAS PNL BLDA: SIGNIFICANT CHANGE UP
GAS PNL BLDMV: SIGNIFICANT CHANGE UP
GAS PNL BLDV: 130 MMOL/L — LOW (ref 136–145)
GAS PNL BLDV: 130 MMOL/L — LOW (ref 136–145)
GAS PNL BLDV: SIGNIFICANT CHANGE UP
GAS PNL BLDV: SIGNIFICANT CHANGE UP
GLUCOSE BLDC GLUCOMTR-MCNC: 153 MG/DL — HIGH (ref 70–99)
GLUCOSE BLDC GLUCOMTR-MCNC: 153 MG/DL — HIGH (ref 70–99)
GLUCOSE BLDC GLUCOMTR-MCNC: 155 MG/DL — HIGH (ref 70–99)
GLUCOSE BLDC GLUCOMTR-MCNC: 155 MG/DL — HIGH (ref 70–99)
GLUCOSE BLDC GLUCOMTR-MCNC: 161 MG/DL — HIGH (ref 70–99)
GLUCOSE BLDC GLUCOMTR-MCNC: 161 MG/DL — HIGH (ref 70–99)
GLUCOSE BLDC GLUCOMTR-MCNC: 170 MG/DL — HIGH (ref 70–99)
GLUCOSE BLDC GLUCOMTR-MCNC: 170 MG/DL — HIGH (ref 70–99)
GLUCOSE BLDV-MCNC: 170 MG/DL — HIGH (ref 70–99)
GLUCOSE BLDV-MCNC: 170 MG/DL — HIGH (ref 70–99)
GLUCOSE SERPL-MCNC: 152 MG/DL — HIGH (ref 70–99)
GLUCOSE SERPL-MCNC: 152 MG/DL — HIGH (ref 70–99)
GLUCOSE SERPL-MCNC: 184 MG/DL — HIGH (ref 70–99)
GLUCOSE SERPL-MCNC: 184 MG/DL — HIGH (ref 70–99)
HCO3 BLDMV-SCNC: 24 MMOL/L — SIGNIFICANT CHANGE UP (ref 20–28)
HCO3 BLDMV-SCNC: 26 MMOL/L — SIGNIFICANT CHANGE UP (ref 20–28)
HCO3 BLDMV-SCNC: 26 MMOL/L — SIGNIFICANT CHANGE UP (ref 20–28)
HCO3 BLDV-SCNC: 25 MMOL/L — SIGNIFICANT CHANGE UP (ref 22–29)
HCO3 BLDV-SCNC: 25 MMOL/L — SIGNIFICANT CHANGE UP (ref 22–29)
HCT VFR BLD CALC: 35.2 % — LOW (ref 39–50)
HCT VFR BLD CALC: 35.2 % — LOW (ref 39–50)
HCT VFR BLD CALC: 36.6 % — LOW (ref 39–50)
HCT VFR BLD CALC: 36.6 % — LOW (ref 39–50)
HCT VFR BLDA CALC: 37 % — LOW (ref 39–51)
HCT VFR BLDA CALC: 37 % — LOW (ref 39–51)
HGB BLD CALC-MCNC: 12.3 G/DL — LOW (ref 12.6–17.4)
HGB BLD CALC-MCNC: 12.3 G/DL — LOW (ref 12.6–17.4)
HGB BLD-MCNC: 11.9 G/DL — LOW (ref 13–17)
HGB BLD-MCNC: 11.9 G/DL — LOW (ref 13–17)
HGB BLD-MCNC: 12.5 G/DL — LOW (ref 13–17)
HGB BLD-MCNC: 12.5 G/DL — LOW (ref 13–17)
HOROWITZ INDEX BLDMV+IHG-RTO: 32 — SIGNIFICANT CHANGE UP
HOROWITZ INDEX BLDV+IHG-RTO: 21 — SIGNIFICANT CHANGE UP
HOROWITZ INDEX BLDV+IHG-RTO: 21 — SIGNIFICANT CHANGE UP
IMM GRANULOCYTES NFR BLD AUTO: 0.4 % — SIGNIFICANT CHANGE UP (ref 0–0.9)
IMM GRANULOCYTES NFR BLD AUTO: 0.4 % — SIGNIFICANT CHANGE UP (ref 0–0.9)
IMM GRANULOCYTES NFR BLD AUTO: 0.6 % — SIGNIFICANT CHANGE UP (ref 0–0.9)
IMM GRANULOCYTES NFR BLD AUTO: 0.6 % — SIGNIFICANT CHANGE UP (ref 0–0.9)
LACTATE BLDV-MCNC: 1.3 MMOL/L — SIGNIFICANT CHANGE UP (ref 0.5–2)
LACTATE BLDV-MCNC: 1.3 MMOL/L — SIGNIFICANT CHANGE UP (ref 0.5–2)
LYMPHOCYTES # BLD AUTO: 1.76 K/UL — SIGNIFICANT CHANGE UP (ref 1–3.3)
LYMPHOCYTES # BLD AUTO: 1.76 K/UL — SIGNIFICANT CHANGE UP (ref 1–3.3)
LYMPHOCYTES # BLD AUTO: 17.6 % — SIGNIFICANT CHANGE UP (ref 13–44)
LYMPHOCYTES # BLD AUTO: 17.6 % — SIGNIFICANT CHANGE UP (ref 13–44)
LYMPHOCYTES # BLD AUTO: 2.17 K/UL — SIGNIFICANT CHANGE UP (ref 1–3.3)
LYMPHOCYTES # BLD AUTO: 2.17 K/UL — SIGNIFICANT CHANGE UP (ref 1–3.3)
LYMPHOCYTES # BLD AUTO: 20.3 % — SIGNIFICANT CHANGE UP (ref 13–44)
LYMPHOCYTES # BLD AUTO: 20.3 % — SIGNIFICANT CHANGE UP (ref 13–44)
MAGNESIUM SERPL-MCNC: 2 MG/DL — SIGNIFICANT CHANGE UP (ref 1.6–2.6)
MCHC RBC-ENTMCNC: 28 PG — SIGNIFICANT CHANGE UP (ref 27–34)
MCHC RBC-ENTMCNC: 28 PG — SIGNIFICANT CHANGE UP (ref 27–34)
MCHC RBC-ENTMCNC: 28.2 PG — SIGNIFICANT CHANGE UP (ref 27–34)
MCHC RBC-ENTMCNC: 28.2 PG — SIGNIFICANT CHANGE UP (ref 27–34)
MCHC RBC-ENTMCNC: 33.8 GM/DL — SIGNIFICANT CHANGE UP (ref 32–36)
MCHC RBC-ENTMCNC: 33.8 GM/DL — SIGNIFICANT CHANGE UP (ref 32–36)
MCHC RBC-ENTMCNC: 34.2 GM/DL — SIGNIFICANT CHANGE UP (ref 32–36)
MCHC RBC-ENTMCNC: 34.2 GM/DL — SIGNIFICANT CHANGE UP (ref 32–36)
MCV RBC AUTO: 82.4 FL — SIGNIFICANT CHANGE UP (ref 80–100)
MCV RBC AUTO: 82.4 FL — SIGNIFICANT CHANGE UP (ref 80–100)
MCV RBC AUTO: 82.8 FL — SIGNIFICANT CHANGE UP (ref 80–100)
MCV RBC AUTO: 82.8 FL — SIGNIFICANT CHANGE UP (ref 80–100)
MONOCYTES # BLD AUTO: 0.95 K/UL — HIGH (ref 0–0.9)
MONOCYTES # BLD AUTO: 0.95 K/UL — HIGH (ref 0–0.9)
MONOCYTES # BLD AUTO: 1 K/UL — HIGH (ref 0–0.9)
MONOCYTES # BLD AUTO: 1 K/UL — HIGH (ref 0–0.9)
MONOCYTES NFR BLD AUTO: 9.4 % — SIGNIFICANT CHANGE UP (ref 2–14)
MONOCYTES NFR BLD AUTO: 9.4 % — SIGNIFICANT CHANGE UP (ref 2–14)
MONOCYTES NFR BLD AUTO: 9.5 % — SIGNIFICANT CHANGE UP (ref 2–14)
MONOCYTES NFR BLD AUTO: 9.5 % — SIGNIFICANT CHANGE UP (ref 2–14)
NEUTROPHILS # BLD AUTO: 7.13 K/UL — SIGNIFICANT CHANGE UP (ref 1.8–7.4)
NEUTROPHILS # BLD AUTO: 7.13 K/UL — SIGNIFICANT CHANGE UP (ref 1.8–7.4)
NEUTROPHILS # BLD AUTO: 7.26 K/UL — SIGNIFICANT CHANGE UP (ref 1.8–7.4)
NEUTROPHILS # BLD AUTO: 7.26 K/UL — SIGNIFICANT CHANGE UP (ref 1.8–7.4)
NEUTROPHILS NFR BLD AUTO: 67.9 % — SIGNIFICANT CHANGE UP (ref 43–77)
NEUTROPHILS NFR BLD AUTO: 67.9 % — SIGNIFICANT CHANGE UP (ref 43–77)
NEUTROPHILS NFR BLD AUTO: 71.2 % — SIGNIFICANT CHANGE UP (ref 43–77)
NEUTROPHILS NFR BLD AUTO: 71.2 % — SIGNIFICANT CHANGE UP (ref 43–77)
NRBC # BLD: 0 /100 WBCS — SIGNIFICANT CHANGE UP (ref 0–0)
O2 CT VFR BLD CALC: 44 MMHG — SIGNIFICANT CHANGE UP (ref 30–65)
O2 CT VFR BLD CALC: 44 MMHG — SIGNIFICANT CHANGE UP (ref 30–65)
O2 CT VFR BLD CALC: 46 MMHG — SIGNIFICANT CHANGE UP (ref 30–65)
PCO2 BLDMV: 35 MMHG — SIGNIFICANT CHANGE UP (ref 30–65)
PCO2 BLDMV: 35 MMHG — SIGNIFICANT CHANGE UP (ref 30–65)
PCO2 BLDMV: 36 MMHG — SIGNIFICANT CHANGE UP (ref 30–65)
PCO2 BLDMV: 36 MMHG — SIGNIFICANT CHANGE UP (ref 30–65)
PCO2 BLDMV: 40 MMHG — SIGNIFICANT CHANGE UP (ref 30–65)
PCO2 BLDMV: 40 MMHG — SIGNIFICANT CHANGE UP (ref 30–65)
PCO2 BLDV: 38 MMHG — LOW (ref 42–55)
PCO2 BLDV: 38 MMHG — LOW (ref 42–55)
PH BLDMV: 7.42 — SIGNIFICANT CHANGE UP (ref 7.32–7.45)
PH BLDMV: 7.42 — SIGNIFICANT CHANGE UP (ref 7.32–7.45)
PH BLDMV: 7.44 — SIGNIFICANT CHANGE UP (ref 7.32–7.45)
PH BLDMV: 7.44 — SIGNIFICANT CHANGE UP (ref 7.32–7.45)
PH BLDMV: 7.45 — SIGNIFICANT CHANGE UP (ref 7.32–7.45)
PH BLDMV: 7.45 — SIGNIFICANT CHANGE UP (ref 7.32–7.45)
PH BLDV: 7.43 — SIGNIFICANT CHANGE UP (ref 7.32–7.43)
PH BLDV: 7.43 — SIGNIFICANT CHANGE UP (ref 7.32–7.43)
PHOSPHATE SERPL-MCNC: 2.6 MG/DL — SIGNIFICANT CHANGE UP (ref 2.5–4.5)
PHOSPHATE SERPL-MCNC: 2.6 MG/DL — SIGNIFICANT CHANGE UP (ref 2.5–4.5)
PHOSPHATE SERPL-MCNC: 3 MG/DL — SIGNIFICANT CHANGE UP (ref 2.5–4.5)
PHOSPHATE SERPL-MCNC: 3 MG/DL — SIGNIFICANT CHANGE UP (ref 2.5–4.5)
PLATELET # BLD AUTO: 120 K/UL — LOW (ref 150–400)
PLATELET # BLD AUTO: 120 K/UL — LOW (ref 150–400)
PLATELET # BLD AUTO: 126 K/UL — LOW (ref 150–400)
PLATELET # BLD AUTO: 126 K/UL — LOW (ref 150–400)
PO2 BLDV: 40 MMHG — SIGNIFICANT CHANGE UP (ref 25–45)
PO2 BLDV: 40 MMHG — SIGNIFICANT CHANGE UP (ref 25–45)
POTASSIUM BLDV-SCNC: 4.1 MMOL/L — SIGNIFICANT CHANGE UP (ref 3.5–5.1)
POTASSIUM BLDV-SCNC: 4.1 MMOL/L — SIGNIFICANT CHANGE UP (ref 3.5–5.1)
POTASSIUM SERPL-MCNC: 4.1 MMOL/L — SIGNIFICANT CHANGE UP (ref 3.5–5.3)
POTASSIUM SERPL-MCNC: 4.1 MMOL/L — SIGNIFICANT CHANGE UP (ref 3.5–5.3)
POTASSIUM SERPL-MCNC: 4.2 MMOL/L — SIGNIFICANT CHANGE UP (ref 3.5–5.3)
POTASSIUM SERPL-MCNC: 4.2 MMOL/L — SIGNIFICANT CHANGE UP (ref 3.5–5.3)
POTASSIUM SERPL-SCNC: 4.1 MMOL/L — SIGNIFICANT CHANGE UP (ref 3.5–5.3)
POTASSIUM SERPL-SCNC: 4.1 MMOL/L — SIGNIFICANT CHANGE UP (ref 3.5–5.3)
POTASSIUM SERPL-SCNC: 4.2 MMOL/L — SIGNIFICANT CHANGE UP (ref 3.5–5.3)
POTASSIUM SERPL-SCNC: 4.2 MMOL/L — SIGNIFICANT CHANGE UP (ref 3.5–5.3)
PROT SERPL-MCNC: 5.9 G/DL — LOW (ref 6–8.3)
PROT SERPL-MCNC: 5.9 G/DL — LOW (ref 6–8.3)
PROT SERPL-MCNC: 6.2 G/DL — SIGNIFICANT CHANGE UP (ref 6–8.3)
PROT SERPL-MCNC: 6.2 G/DL — SIGNIFICANT CHANGE UP (ref 6–8.3)
RBC # BLD: 4.25 M/UL — SIGNIFICANT CHANGE UP (ref 4.2–5.8)
RBC # BLD: 4.25 M/UL — SIGNIFICANT CHANGE UP (ref 4.2–5.8)
RBC # BLD: 4.44 M/UL — SIGNIFICANT CHANGE UP (ref 4.2–5.8)
RBC # BLD: 4.44 M/UL — SIGNIFICANT CHANGE UP (ref 4.2–5.8)
RBC # FLD: 14.4 % — SIGNIFICANT CHANGE UP (ref 10.3–14.5)
RBC # FLD: 14.4 % — SIGNIFICANT CHANGE UP (ref 10.3–14.5)
RBC # FLD: 14.6 % — HIGH (ref 10.3–14.5)
RBC # FLD: 14.6 % — HIGH (ref 10.3–14.5)
SAO2 % BLDMV: 71.9 — SIGNIFICANT CHANGE UP (ref 60–90)
SAO2 % BLDMV: 71.9 — SIGNIFICANT CHANGE UP (ref 60–90)
SAO2 % BLDMV: 72.5 — SIGNIFICANT CHANGE UP (ref 60–90)
SAO2 % BLDMV: 72.5 — SIGNIFICANT CHANGE UP (ref 60–90)
SAO2 % BLDMV: 78.4 — SIGNIFICANT CHANGE UP (ref 60–90)
SAO2 % BLDMV: 78.4 — SIGNIFICANT CHANGE UP (ref 60–90)
SAO2 % BLDV: 67.1 % — SIGNIFICANT CHANGE UP (ref 67–88)
SAO2 % BLDV: 67.1 % — SIGNIFICANT CHANGE UP (ref 67–88)
SODIUM SERPL-SCNC: 133 MMOL/L — LOW (ref 135–145)
WBC # BLD: 10.01 K/UL — SIGNIFICANT CHANGE UP (ref 3.8–10.5)
WBC # BLD: 10.01 K/UL — SIGNIFICANT CHANGE UP (ref 3.8–10.5)
WBC # BLD: 10.68 K/UL — HIGH (ref 3.8–10.5)
WBC # BLD: 10.68 K/UL — HIGH (ref 3.8–10.5)
WBC # FLD AUTO: 10.01 K/UL — SIGNIFICANT CHANGE UP (ref 3.8–10.5)
WBC # FLD AUTO: 10.01 K/UL — SIGNIFICANT CHANGE UP (ref 3.8–10.5)
WBC # FLD AUTO: 10.68 K/UL — HIGH (ref 3.8–10.5)
WBC # FLD AUTO: 10.68 K/UL — HIGH (ref 3.8–10.5)

## 2023-11-05 PROCEDURE — 99291 CRITICAL CARE FIRST HOUR: CPT

## 2023-11-05 PROCEDURE — 93010 ELECTROCARDIOGRAM REPORT: CPT

## 2023-11-05 PROCEDURE — 99291 CRITICAL CARE FIRST HOUR: CPT | Mod: GC

## 2023-11-05 PROCEDURE — 71045 X-RAY EXAM CHEST 1 VIEW: CPT | Mod: 26

## 2023-11-05 PROCEDURE — 99292 CRITICAL CARE ADDL 30 MIN: CPT

## 2023-11-05 RX ORDER — DEXMEDETOMIDINE HYDROCHLORIDE IN 0.9% SODIUM CHLORIDE 4 UG/ML
0.5 INJECTION INTRAVENOUS
Qty: 200 | Refills: 0 | Status: DISCONTINUED | OUTPATIENT
Start: 2023-11-05 | End: 2023-11-08

## 2023-11-05 RX ORDER — ISOSORBIDE DINITRATE 5 MG/1
10 TABLET ORAL THREE TIMES A DAY
Refills: 0 | Status: DISCONTINUED | OUTPATIENT
Start: 2023-11-05 | End: 2023-11-05

## 2023-11-05 RX ORDER — DEXMEDETOMIDINE HYDROCHLORIDE IN 0.9% SODIUM CHLORIDE 4 UG/ML
0.5 INJECTION INTRAVENOUS
Qty: 400 | Refills: 0 | Status: DISCONTINUED | OUTPATIENT
Start: 2023-11-05 | End: 2023-11-05

## 2023-11-05 RX ORDER — ISOSORBIDE DINITRATE 5 MG/1
20 TABLET ORAL THREE TIMES A DAY
Refills: 0 | Status: DISCONTINUED | OUTPATIENT
Start: 2023-11-05 | End: 2023-11-06

## 2023-11-05 RX ORDER — HYDRALAZINE HCL 50 MG
30 TABLET ORAL EVERY 8 HOURS
Refills: 0 | Status: DISCONTINUED | OUTPATIENT
Start: 2023-11-05 | End: 2023-11-06

## 2023-11-05 RX ORDER — HYDRALAZINE HCL 50 MG
20 TABLET ORAL EVERY 8 HOURS
Refills: 0 | Status: DISCONTINUED | OUTPATIENT
Start: 2023-11-05 | End: 2023-11-05

## 2023-11-05 RX ADMIN — DEXMEDETOMIDINE HYDROCHLORIDE IN 0.9% SODIUM CHLORIDE 12.4 MICROGRAM(S)/KG/HR: 4 INJECTION INTRAVENOUS at 08:09

## 2023-11-05 RX ADMIN — SPIRONOLACTONE 25 MILLIGRAM(S): 25 TABLET, FILM COATED ORAL at 06:40

## 2023-11-05 RX ADMIN — Medication 30 MILLIGRAM(S): at 13:07

## 2023-11-05 RX ADMIN — Medication 0.5 MILLIGRAM(S): at 23:19

## 2023-11-05 RX ADMIN — Medication 1 MILLIGRAM(S): at 01:41

## 2023-11-05 RX ADMIN — Medication 2: at 08:08

## 2023-11-05 RX ADMIN — DEXMEDETOMIDINE HYDROCHLORIDE IN 0.9% SODIUM CHLORIDE 12.4 MICROGRAM(S)/KG/HR: 4 INJECTION INTRAVENOUS at 12:25

## 2023-11-05 RX ADMIN — SODIUM NITROPRUSSIDE 7.42 MICROGRAM(S)/KG/MIN: 50 INJECTION INTRAVENOUS at 08:54

## 2023-11-05 RX ADMIN — Medication 20 MILLIGRAM(S): at 06:41

## 2023-11-05 RX ADMIN — Medication 0.5 MILLIGRAM(S): at 14:09

## 2023-11-05 RX ADMIN — HEPARIN SODIUM 13 UNIT(S)/HR: 5000 INJECTION INTRAVENOUS; SUBCUTANEOUS at 20:22

## 2023-11-05 RX ADMIN — SODIUM NITROPRUSSIDE 7.42 MICROGRAM(S)/KG/MIN: 50 INJECTION INTRAVENOUS at 08:10

## 2023-11-05 RX ADMIN — Medication 30 MILLIGRAM(S): at 22:49

## 2023-11-05 RX ADMIN — PANTOPRAZOLE SODIUM 40 MILLIGRAM(S): 20 TABLET, DELAYED RELEASE ORAL at 06:40

## 2023-11-05 RX ADMIN — ISOSORBIDE DINITRATE 20 MILLIGRAM(S): 5 TABLET ORAL at 10:23

## 2023-11-05 RX ADMIN — DEXMEDETOMIDINE HYDROCHLORIDE IN 0.9% SODIUM CHLORIDE 12.4 MICROGRAM(S)/KG/HR: 4 INJECTION INTRAVENOUS at 20:22

## 2023-11-05 RX ADMIN — TICAGRELOR 90 MILLIGRAM(S): 90 TABLET ORAL at 06:41

## 2023-11-05 RX ADMIN — DEXMEDETOMIDINE HYDROCHLORIDE IN 0.9% SODIUM CHLORIDE 12.4 MICROGRAM(S)/KG/HR: 4 INJECTION INTRAVENOUS at 02:00

## 2023-11-05 RX ADMIN — ISOSORBIDE DINITRATE 10 MILLIGRAM(S): 5 TABLET ORAL at 06:40

## 2023-11-05 RX ADMIN — ISOSORBIDE DINITRATE 20 MILLIGRAM(S): 5 TABLET ORAL at 16:26

## 2023-11-05 RX ADMIN — SODIUM NITROPRUSSIDE 7.42 MICROGRAM(S)/KG/MIN: 50 INJECTION INTRAVENOUS at 20:23

## 2023-11-05 RX ADMIN — INSULIN GLARGINE 8 UNIT(S): 100 INJECTION, SOLUTION SUBCUTANEOUS at 22:51

## 2023-11-05 RX ADMIN — TICAGRELOR 90 MILLIGRAM(S): 90 TABLET ORAL at 17:50

## 2023-11-05 RX ADMIN — DEXMEDETOMIDINE HYDROCHLORIDE IN 0.9% SODIUM CHLORIDE 12.4 MICROGRAM(S)/KG/HR: 4 INJECTION INTRAVENOUS at 17:50

## 2023-11-05 RX ADMIN — Medication 81 MILLIGRAM(S): at 11:23

## 2023-11-05 RX ADMIN — ATORVASTATIN CALCIUM 80 MILLIGRAM(S): 80 TABLET, FILM COATED ORAL at 22:51

## 2023-11-05 RX ADMIN — HEPARIN SODIUM 13 UNIT(S)/HR: 5000 INJECTION INTRAVENOUS; SUBCUTANEOUS at 10:24

## 2023-11-05 RX ADMIN — Medication 1 MILLIGRAM(S): at 23:43

## 2023-11-05 RX ADMIN — SODIUM NITROPRUSSIDE 7.42 MICROGRAM(S)/KG/MIN: 50 INJECTION INTRAVENOUS at 01:00

## 2023-11-05 RX ADMIN — HEPARIN SODIUM 13 UNIT(S)/HR: 5000 INJECTION INTRAVENOUS; SUBCUTANEOUS at 01:30

## 2023-11-05 RX ADMIN — Medication 2: at 11:24

## 2023-11-05 RX ADMIN — Medication 2: at 16:34

## 2023-11-05 NOTE — PROGRESS NOTE ADULT - ATTENDING COMMENTS
59 y/o M with CAD and ischemic cardiomyopathy, admitted with SOB. EKG with ST depressions, LHC with multivessel occlusive CAD, RHC with elevated filling pressures and low cardiac output. IABP placed. Exubated 11/3/2023. Found to be Covid +.   IABP wean Friday night with PA sat in the 80's, concern that it may be due to mixed cardiogenic/septic picture, not just improving cardiogenic shock. Thus would IABP left in to provide hemodynamic support. Given ongoing fevers, would leave IABP in today. If he does well throughout the day with no fevers, consider repeating IABP wean tonight and removing IABP tomorrow.   Wean Nipride.  No further HF medical therapy until more hemodynamically stable.

## 2023-11-05 NOTE — PROGRESS NOTE ADULT - SUBJECTIVE AND OBJECTIVE BOX
LD VALENCIA  MRN-[id number]  Patient is a 58y old  Male who presents with a chief complaint of Cardiogenic shock (2023 21:28)    HPI:  pt seen and approx 1:30 pm  in [facility]    57yo M w/ hx HTN, CAD w/ 1 stent in , ICH () presenting with abn ekg. Patient presented to [facility] where he was found to have STEMI, recommended to get cath however patient did not want to get it there so it left and came here.  Patient initially had cough, congestion, fever, was placed on antibiotics on .  Started feeling nauseous and had a presyncopal event after which he presented to ED last night.  Had chest pain as well.  Chest pain is midsternal.  Not currently having chest pain.  Received 4 aspirin 30 min pta. (2023 15:11)      Hospital Course:    24 HOUR EVENTS:    REVIEW OF SYSTEMS:    CONSTITUTIONAL: No weakness, fevers or chills  EYES/ENT: No visual changes;  No vertigo or throat pain   NECK: No pain or stiffness  RESPIRATORY: No cough, wheezing, hemoptysis; No shortness of breath  CARDIOVASCULAR: No chest pain or palpitations  GASTROINTESTINAL: No abdominal or epigastric pain. No nausea, vomiting, or hematemesis; No diarrhea or constipation. No melena or hematochezia.  GENITOURINARY: No dysuria, frequency or hematuria  NEUROLOGICAL: No numbness or weakness  SKIN: No itching, rashes      ICU Vital Signs Last 24 Hrs  T(C): 37.3 (2023 16:00), Max: 37.9 (2023 08:00)  T(F): 99.1 (2023 16:00), Max: 100.2 (2023 08:00)  HR: 91 (2023 19:45) (88 - 123)  BP: --  BP(mean): --  ABP: 119/70 (2023 19:45) (77/50 - 143/81)  ABP(mean): 92 (2023 19:45) (62 - 109)  RR: 30 (2023 19:45) (14 - 98)  SpO2: 95% (2023 19:45) (91% - 97%)    O2 Parameters below as of 2023 08:00  Patient On (Oxygen Delivery Method): nasal cannula  O2 Flow (L/min): 3          CVP(mm Hg): 10 (23 @ 19:45) (0 - 16)  CO: --  CI: --  PA: 34/17 (23 @ 19:45) (17/5 - 58/36)  PA(mean): 23 (23 @ 19:45) (9 - 48)  PA(direct): --  PCWP: --  LA: --  RA: --  SVR: --  SVRI: --  PVR: --  PVRI: --  I&O's Summary    2023 08:01  -  2023 07:00  --------------------------------------------------------  IN: 3048.2 mL / OUT: 3270 mL / NET: -221.8 mL    2023 07:01  -  2023 20:04  --------------------------------------------------------  IN: 795.6 mL / OUT: 1765 mL / NET: -969.4 mL        CAPILLARY BLOOD GLUCOSE    CAPILLARY BLOOD GLUCOSE      POCT Blood Glucose.: 153 mg/dL (2023 16:31)      PHYSICAL EXAM:  GENERAL: No acute distress, well-developed  HEAD:  Atraumatic, Normocephalic  EYES: EOMI, PERRLA, conjunctiva and sclera clear  NECK: Supple, no lymphadenopathy, no JVD  CHEST/LUNG: CTAB; No wheezes, rales, or rhonchi  HEART: Regular rate and rhythm. Normal S1/S2. No murmurs, rubs, or gallops  ABDOMEN: Soft, non-tender, non-distended; normal bowel sounds, no organomegaly  EXTREMITIES:  2+ peripheral pulses b/l, No clubbing, cyanosis, or edema  NEUROLOGY: A&O x 3, no focal deficits  SKIN: No rashes or lesions    ============================I/O===========================   I&O's Detail    2023 08:01  -  2023 07:00  --------------------------------------------------------  IN:    Dexmedetomidine: 62 mL    Heparin: 253.5 mL    IV PiggyBack: 700 mL    Nitroprusside: 912.7 mL    Oral Fluid: 1120 mL  Total IN: 3048.2 mL    OUT:    Indwelling Catheter - Urethral (mL): 3270 mL  Total OUT: 3270 mL    Total NET: -221.8 mL      2023 07:01  -  2023 20:04  --------------------------------------------------------  IN:    Dexmedetomidine: 143.7 mL    Heparin: 169 mL    Nitroprusside: 162.9 mL    Oral Fluid: 320 mL  Total IN: 795.6 mL    OUT:    Indwelling Catheter - Urethral (mL): 1765 mL  Total OUT: 1765 mL    Total NET: -969.4 mL        ============================ LABS =========================                        11.9   10.68 )-----------( 120      ( 2023 14:33 )             35.2     11-05    133<L>  |  101  |  14  ----------------------------<  152<H>  4.2   |  21<L>  |  1.37<H>    Ca    8.8      2023 14:33  Phos  2.6     11-05  Mg     2.0     11-05    TPro  6.2  /  Alb  3.1<L>  /  TBili  0.7  /  DBili  x   /  AST  69<H>  /  ALT  49<H>  /  AlkPhos  42      Troponin T, High Sensitivity Result: 1226 ng/L (23 @ 01:49)              LIVER FUNCTIONS - ( 2023 14:33 )  Alb: 3.1 g/dL / Pro: 6.2 g/dL / ALK PHOS: 42 U/L / ALT: 49 U/L / AST: 69 U/L / GGT: x           PTT - ( 2023 16:58 )  PTT:63.3 sec  ABG - ( 2023 00:00 )  pH, Arterial: 7.47  pH, Blood: x     /  pCO2: 32    /  pO2: 140   / HCO3: 23    / Base Excess: 0.3   /  SaO2: 98.5              Blood Gas Venous - Lactate: 1.3 mmol/L (23 @ 16:43)  Blood Gas Arterial, Lactate: 1.2 mmol/L (23 @ 00:00)  Blood Gas Arterial, Lactate: 0.7 mmol/L (23 @ 10:22)  Blood Gas Arterial, Lactate: 0.8 mmol/L (23 @ 22:00)  Blood Gas Arterial, Lactate: 0.9 mmol/L (23 @ 18:07)  Blood Gas Arterial, Lactate: 1.0 mmol/L (23 @ 12:16)  Blood Gas Arterial, Lactate: 0.9 mmol/L (23 @ 09:50)  Blood Gas Arterial, Lactate: 0.9 mmol/L (23 @ 01:47)    Urinalysis Basic - ( 2023 14:33 )    Color: x / Appearance: x / SG: x / pH: x  Gluc: 152 mg/dL / Ketone: x  / Bili: x / Urobili: x   Blood: x / Protein: x / Nitrite: x   Leuk Esterase: x / RBC: x / WBC x   Sq Epi: x / Non Sq Epi: x / Bacteria: x      ======================Micro/Rad/Cardio=================  Telemtry: Reviewed   EKG: Reviewed  CXR: Reviewed  Culture: Reviewed   Echo:   Cath: Cardiac Cath Lab - Adult:   [facility]  Department of Cardiology  [address] [place] [id number] [phone]  Cath Lab Report -- Comprehensive Report  Patient: LD VALENCIA  Study date: 2017  Account number: [id number]  MR number: [id number]  : 1965  Gender: Male  Race: W  Case Physician(s):  Jairon Holguin M.D.  Referring Physician:  Luc Lynn M.D.  INDICATIONS: Unstable angina - CCS4.  HISTORY: The patient has a history of coronary artery disease. The patient  hashypertension and medication-treated dyslipidemia.  PROCEDURE:  --  Left heart catheterization with ventriculography.  --  Left coronary angiography.  --  Right coronary angiography.  TECHNIQUE: The risks and alternatives of the procedures and conscious  sedation were explained to the patient and informed consent was obtained.  Cardiac catheterization performed electively.  Local anesthetic given. Right radial artery access. A 6FR PRELUDE KIT was  inserted in the vessel. Left heart catheterization. Ventriculography was  performed. A 5FR FR4.0 EXPO catheter was utilized. Left coronary artery  angiography. The vessel was injected utilizing a 5FR FL3.5 EXPO catheter.  Right coronary artery angiography. The vessel was injected utilizing a 5FR  FR4.0 EXPO catheter. RADIATION EXPOSURE: 1.1 min.  CONTRAST GIVEN: Omnipaque 55 ml.  MEDICATIONS GIVEN: Midazolam, 1 mg, IV. Fentanyl, 25 mcg, IV. Verapamil  (Isoptin, Calan, Covera), 2.5 mg, IA. Heparin, 3000 units, IA.  VENTRICLES: Global left ventricular function was moderately depressed. EF  estimated was 40 %.  CORONARY VESSELS: The coronary circulation is right dominant.  LM:   --  LM: Normal.  LAD:   --  Proximal LAD: There was a 50 % stenosis.  CX:   --  Circumflex: Normal.  RCA:   --  Mid RCA: There was a 40 % stenosis.  --  Distal RCA: There was a 50 % stenosis.  COMPLICATIONS: There were no complications.  DIAGNOSTIC RECOMMENDATIONS: The patient should continue with the present  medications.  Prepared and signed by  Jairon Holguin M.D.  Signed 2017 12:20:13  HEMODYNAMIC TABLES  Pressures:  Baseline/ Room Air  Pressures:  - HR: 78  Pressures:  - Rhythm:  Pressures:  -- Aortic Pressure (S/D/M): --/--/99  Pressures:  -- Left Ventricle (s/edp): 157/39/--  Outputs:  Baseline/ Room Air  Outputs:  -- CALCULATIONS: Age in years: 51.41  Outputs:  -- CALCULATIONS: Body Surface Area: 2.05  Outputs:  -- CALCULATIONS: Height in cm: 175.00  Outputs:  -- CALCULATIONS: Sex: Male  Outputs:  -- CALCULATIONS: Weight in k.40 (17 @ 21:55)    ======================================================  PAST MEDICAL & SURGICAL HISTORY:  HTN (hypertension)      CAD (coronary artery disease)  ; stent      Intracranial hemorrhage        Respiratory arrest        Myocardial infarction, unspecified MI type, unspecified artery      History of coronary artery stent placement  ====================ASSESSMENT ==============  57 yo M with HTN, CAD (s/p PCI ), HFrEF (EF severely reduced ) not on GDMT, CVA  (requiring tpA), DM presenting with chest pressure and unknown tachycardia that was shocked x1, C  found to have chronic total occlusion of LAD and RCA, with elevated RA and PA pressures, echo  w/ severely decreased EF 32%, s/p IABP , likely in cardiogenic shock 2/2 acute on chronic HF s/p unknown shockable rhythm.    ============================= NEUROLOGY =============================  A&Ox3, s/p extubation 11/3    Hx of CVA requiring TPA c/b ICH  -no neuro deficits known    ============================= Anxiety =============================  Takes 0.5 ativan PRN at home  -will give 0.25-0.5 ativan PRN for anxiety here  -psych consult on Monday for medical management of anxiety  -  Changed PRN ativan to standing 0.5 Ativan q8 (-) due to delay  overnight    ============================= RESPIRATORY =============================  Intubated for AHRF prior to [facility], extubated 11/3  - wean O2 as tolerated, currently on NC  - trend ABGs  - continue to monitor SpO2 with goal >94%  - COVID +  -watch off abx; vanc and cefepime ()  -MRSA swab neg  - Had episodes of desaturation  overnight     #R sided opacification on CXR   -improved today 11/3  -Possibly iso aspiration event prior to intubation  -watch off abx; vanc and cefepime ()  -MRSA swab neg  -sputum cx  no growth    #History of PNA  Pt initially had cough, congestion, fever, was placed on antibiotics on Juan 10/29  -CXR here clear  -s/p levaquin (10/29-10/31)  -if afebrile, remains w/o leukocytosis       #Asthma (hx of respiratory failure in 2018- intubated for 20 minutes per chart review pt report)  -c/w albuterol qd  -c/ed montelukast- does not take at home  -c/w home trelegy     ============================= CARDIOVASCULAR =============================    #Cardiogenic Shock requiring IABP (-) s/p unknown shockable event due to ischemia?  -  C: pLAD 100 % in-stent restenosis & mRCA, 100 %. PCWP 30. +IABP.  -  TTE: LV dilated. EF 32 %. Regional WMAs present, mod (grade 2) LV diastolic dysfunction  -per echo areas of akinesis are not new compared to 2017, but EF is severely decreased   -currently on IABP 1:1, with augmentation of 130s, Mean 120s  -c/w nipride gtt for weaning of IABP  - Plan to d/c IABP  if BCx sent  are negative  -s/p levo  -daily CXR for IABP  -Keep right leg straight while sheaths/IABP/[place] are in position       #STEMI at OSH  -Trop 440-> 414  -EKG here w/ LBBB  -LHC : pLAD 70 % stenosis in the ostium portion of the segment and 100 % in-stent restenosis. mRCA, 100 % stenosis. RA 23, PA 51/33, wedge 31, PA sat 54%, CI 1.5  -s/p IABP  -c/w ASA, brillinta (takes plavix at home?)  -c/w lipitor 80  -c/w heparin gtt  -TTE : severely reduced EF, no new areas of akinesis  -CT surg not recommending CABG, instead AT eval  -viability study when IABP removed    #HFrEF w/ severely reduced EF 32%  TTE : LV moderately dilated. EF 32 %. Regional wall motion abnormalities present, mod (grade 2) LV diastolic dysfunction, with indeterminant filling pressure.   TTE : LV thrombus, EF of 22% w/ global hypokinesis  -s/p lasix 40  -continue management of shock as above  -start GDMT when stable  - BNP 4168 on admission  - CXR  clear   - tele  - S/p lasix 40 on admission  -presently euvolemic w/ CVP 6  - Strict I&O  - Daily weights  - Fluid restriction  - on ozempic and dapaglifozin at home    #LV thrombus  TTE : LV thrombus, EF of 22% w/ global hypokinesis  -c/w heparin gtt    #HTN  -hold home losartan 25, HCTZ 25, coreg 25 bid for now    ============================= RENAL =============================  Baseline Cr: 1-1.22  -trend Cr    #Metabolic acidosis  Likely 2/2 lactate of 3.7, due to cardiogenic shock  Improved  -trend lactate, blood gas  -IABP     ============================= GI =============================  #Transaminitis  Likely iso cardiogenic shock/abnormal shockable event  AST/ALT: 94/50 on admission  -trend LFTs    #Diet:   -passed dysphagia screen  -will advance diet to soft and bite sized    GERD  -changed PPI to qd    #Bowel regimen:   -miralax  -senna    ============================= ENDO =============================  #Type 2 DM  A1c 8.3  -TSH 0.50    METABOLIC:  -no active issues    ============================= HEMATOLOGIC =============================  H/H stable    #DVT prophylaxis with heparin    ============================= ID =============================  #Concern for aspiration event prior to intubation  R sided opacification on CXR , clear today 11/3  -Possibly iso aspiration event prior to intubation  -pt febrile to 100.6 today ()  -repeat blood cx today   -UA  neg  -f/u CXR  -per ID, no change in plan   -monitor off vanc and cefepime ()  -MRSA swab negative  -sputum cx  no growth  -blood cx  no growth    #History of PNA  Pt initially had cough, congestion, fever, was placed on antibiotics on Juan 10/29  -CXR  clear  -s/p levaquin (10/29-10/31)    Patient requires continuous monitoring with bedside rhythm monitoring, pulse ox monitoring, and intermittent blood gas analysis. Care plan discussed with ICU care team. Patient remained critical and at risk for life threatening decompensation.  Patient seen, examined and plan discussed with CCU team during rounds.     I have personally provided ____ minutes of critical care time excluding time spent on separate procedures, in addition to initial critical care time provided by the CICU Attending, Dr. Cunningham.    By signing my name below, I, Kalyn Sousa, attest that this documentation has been prepared under the direction and in the presence of Rita Hawthorne NP.  Electronically signed: Rosalba Booth, 23 @ 20:04    I, Rita Hawthorne , personally performed the services described in this documentation. all medical record entries made by the scribe were at my direction and in my presence. I have reviewed the chart and agree that the record reflects my personal performance and is accurate and complete  Electronically signed: Rita Hawthorne NP.       LD VALENCIA  MRN-[id number]  Patient is a 58y old  Male who presents with a chief complaint of Cardiogenic shock (2023 21:28)    HPI: 58M w/ hx HTN, CAD w/ 1 stent in , ICH () presenting with abn ekg. Patient presented to [facility] where he was found to have STEMI, recommended to get cath however patient did not want to get it there so it left and came here.  Patient initially had cough, congestion, fever, was placed on antibiotics on .  Started feeling nauseous and had a presyncopal event after which he presented to ED last night.  Had chest pain as well.  Chest pain is midsternal.  Not currently having chest pain.  Received 4 aspirin 30 min pta. (2023 15:11)    REVIEW OF SYSTEMS:  CONSTITUTIONAL: No weakness, fevers or chills  EYES/ENT: No visual changes;  No vertigo or throat pain   NECK: No pain or stiffness  RESPIRATORY: No cough, wheezing, hemoptysis; No shortness of breath  CARDIOVASCULAR: No chest pain or palpitations  GASTROINTESTINAL: No abdominal or epigastric pain. No nausea, vomiting, or hematemesis; No diarrhea or constipation. No melena or hematochezia.  GENITOURINARY: No dysuria, frequency or hematuria  NEUROLOGICAL: No numbness or weakness  SKIN: No itching, rashes      ICU Vital Signs Last 24 Hrs  T(C): 37.3 (2023 16:00), Max: 37.9 (2023 08:00)  T(F): 99.1 (2023 16:00), Max: 100.2 (2023 08:00)  HR: 91 (2023 19:45) (88 - 123)  ABP: 119/70 (2023 19:45) (77/50 - 143/81)  ABP(mean): 92 (2023 19:45) (62 - 109)  RR: 30 (2023 19:45) (14 - 98)  SpO2: 95% (2023 19:45) (91% - 97%)    O2 Parameters below as of 2023 08:00  Patient On (Oxygen Delivery Method): nasal cannula  O2 Flow (L/min): 3          CVP(mm Hg): 10 (11-05-23 @ 19:45) (0 - 16)  CO: --  CI: --  PA: 34/17 (23 @ 19:45) (17/5 - 58/36)  PA(mean): 23 (23 @ 19:45) (9 - 48)  PA(direct): --  PCWP: --  LA: --  RA: --  SVR: --  SVRI: --  PVR: --  PVRI: --  I&O's Summary    2023 08:01  -  2023 07:00  --------------------------------------------------------  IN: 3048.2 mL / OUT: 3270 mL / NET: -221.8 mL    2023 07:01  -  2023 20:04  --------------------------------------------------------  IN: 795.6 mL / OUT: 1765 mL / NET: -969.4 mL        CAPILLARY BLOOD GLUCOSE    CAPILLARY BLOOD GLUCOSE      POCT Blood Glucose.: 153 mg/dL (2023 16:31)      PHYSICAL EXAM:  GENERAL: No acute distress, well-developed  HEAD:  Atraumatic, Normocephalic  EYES: EOMI, PERRLA, conjunctiva and sclera clear  NECK: Supple, no lymphadenopathy, no JVD  CHEST/LUNG: CTAB; No wheezes, rales, or rhonchi  HEART: Regular rate and rhythm. Normal S1/S2. No murmurs, rubs, or gallops  ABDOMEN: Soft, non-tender, non-distended; normal bowel sounds, no organomegaly  EXTREMITIES:  2+ peripheral pulses b/l, No clubbing, cyanosis, or edema  NEUROLOGY: A&O x 3, no focal deficits  SKIN: No rashes or lesions    ============================I/O===========================   I&O's Detail    2023 08:01  -  2023 07:00  --------------------------------------------------------  IN:    Dexmedetomidine: 62 mL    Heparin: 253.5 mL    IV PiggyBack: 700 mL    Nitroprusside: 912.7 mL    Oral Fluid: 1120 mL  Total IN: 3048.2 mL    OUT:    Indwelling Catheter - Urethral (mL): 3270 mL  Total OUT: 3270 mL    Total NET: -221.8 mL      2023 07:01  -  2023 20:04  --------------------------------------------------------  IN:    Dexmedetomidine: 143.7 mL    Heparin: 169 mL    Nitroprusside: 162.9 mL    Oral Fluid: 320 mL  Total IN: 795.6 mL    OUT:    Indwelling Catheter - Urethral (mL): 1765 mL  Total OUT: 1765 mL    Total NET: -969.4 mL        ============================ LABS =========================                        11.9   10.68 )-----------( 120      ( 2023 14:33 )             35.2     11-05    133<L>  |  101  |  14  ----------------------------<  152<H>  4.2   |  21<L>  |  1.37<H>    Ca    8.8      2023 14:33  Phos  2.6     11-05  Mg     2.0     11-05    TPro  6.2  /  Alb  3.1<L>  /  TBili  0.7  /  DBili  x   /  AST  69<H>  /  ALT  49<H>  /  AlkPhos  42      Troponin T, High Sensitivity Result: 1226 ng/L (23 @ 01:49)              LIVER FUNCTIONS - ( 2023 14:33 )  Alb: 3.1 g/dL / Pro: 6.2 g/dL / ALK PHOS: 42 U/L / ALT: 49 U/L / AST: 69 U/L / GGT: x           PTT - ( 2023 16:58 )  PTT:63.3 sec  ABG - ( 2023 00:00 )  pH, Arterial: 7.47  pH, Blood: x     /  pCO2: 32    /  pO2: 140   / HCO3: 23    / Base Excess: 0.3   /  SaO2: 98.5              Blood Gas Venous - Lactate: 1.3 mmol/L (23 @ 16:43)  Blood Gas Arterial, Lactate: 1.2 mmol/L (23 @ 00:00)  Blood Gas Arterial, Lactate: 0.7 mmol/L (23 @ 10:22)  Blood Gas Arterial, Lactate: 0.8 mmol/L (23 @ 22:00)  Blood Gas Arterial, Lactate: 0.9 mmol/L (23 @ 18:07)  Blood Gas Arterial, Lactate: 1.0 mmol/L (23 @ 12:16)  Blood Gas Arterial, Lactate: 0.9 mmol/L (23 @ 09:50)  Blood Gas Arterial, Lactate: 0.9 mmol/L (23 @ 01:47)    Urinalysis Basic - ( 2023 14:33 )    Color: x / Appearance: x / SG: x / pH: x  Gluc: 152 mg/dL / Ketone: x  / Bili: x / Urobili: x   Blood: x / Protein: x / Nitrite: x   Leuk Esterase: x / RBC: x / WBC x   Sq Epi: x / Non Sq Epi: x / Bacteria: x      ======================Micro/Rad/Cardio=================  Telemtry: Reviewed   EKG: Reviewed  CXR: Reviewed  Culture: Reviewed   Echo:   Cath: Cardiac Cath Lab - Adult:   [facility]  Department of Cardiology  [address] [place] [address]  [phone]  Cath Lab Report -- Comprehensive Report  Patient: LD VALENCIA  Study date: 2017  Account number: [id number]  MR number: [id number]  : 1965  Gender: Male  Race: W  Case Physician(s):  Jairon Holguin M.D.  Referring Physician:  Luc Lynn M.D.  INDICATIONS: Unstable angina - CCS4.  HISTORY: The patient has a history of coronary artery disease. The patient  hashypertension and medication-treated dyslipidemia.  PROCEDURE:  --  Left heart catheterization with ventriculography.  --  Left coronary angiography.  --  Right coronary angiography.  TECHNIQUE: The risks and alternatives of the procedures and conscious  sedation were explained to the patient and informed consent was obtained.  Cardiac catheterization performed electively.  Local anesthetic given. Right radial artery access. A 6FR PRELUDE KIT was  inserted in the vessel. Left heart catheterization. Ventriculography was  performed. A 5FR FR4.0 EXPO catheter was utilized. Left coronary artery  angiography. The vessel was injected utilizing a 5FR FL3.5 EXPO catheter.  Right coronary artery angiography. The vessel was injected utilizing a 5FR  FR4.0 EXPO catheter. RADIATION EXPOSURE: 1.1 min.  CONTRAST GIVEN: Omnipaque 55 ml.  MEDICATIONS GIVEN: Midazolam, 1 mg, IV. Fentanyl, 25 mcg, IV. Verapamil  (Isoptin, Calan, Covera), 2.5 mg, IA. Heparin, 3000 units, IA.  VENTRICLES: Global left ventricular function was moderately depressed. EF  estimated was 40 %.  CORONARY VESSELS: The coronary circulation is right dominant.  LM:   --  LM: Normal.  LAD:   --  Proximal LAD: There was a 50 % stenosis.  CX:   --  Circumflex: Normal.  RCA:   --  Mid RCA: There was a 40 % stenosis.  --  Distal RCA: There was a 50 % stenosis.  COMPLICATIONS: There were no complications.  DIAGNOSTIC RECOMMENDATIONS: The patient should continue with the present  medications.  Prepared and signed by  Jairon Holguin M.D.  Signed 2017 12:20:13  HEMODYNAMIC TABLES  Pressures:  Baseline/ Room Air  Pressures:  - HR: 78  Pressures:  - Rhythm:  Pressures:  -- Aortic Pressure (S/D/M): --/--/99  Pressures:  -- Left Ventricle (s/edp): 157/39/--  Outputs:  Baseline/ Room Air  Outputs:  -- CALCULATIONS: Age in years: 51.41  Outputs:  -- CALCULATIONS: Body Surface Area: 2.05  Outputs:  -- CALCULATIONS: Height in cm: 175.00  Outputs:  -- CALCULATIONS: Sex: Male  Outputs:  -- CALCULATIONS: Weight in k.40 (17 @ 21:55)    ======================================================  PAST MEDICAL & SURGICAL HISTORY:  HTN (hypertension)      CAD (coronary artery disease)  ; stent      Intracranial hemorrhage        Respiratory arrest        Myocardial infarction, unspecified MI type, unspecified artery      History of coronary artery stent placement  ====================ASSESSMENT ==============  57 yo M with HTN, CAD (s/p PCI ), HFrEF (EF severely reduced ) not on GDMT, CVA  (requiring tpA), DM presenting with chest pressure and unknown tachycardia that was shocked x1, LHC  found to have chronic total occlusion of LAD and RCA, with elevated RA and PA pressures, echo  w/ severely decreased EF 32%, s/p IABP , likely in cardiogenic shock 2/2 acute on chronic HF s/p unknown shockable rhythm.    ============================= NEUROLOGY =============================  A&Ox3, s/p extubation 11/3    Hx of CVA requiring TPA c/b ICH  -no neuro deficits known    ============================= Anxiety =============================  Takes 0.5 ativan PRN at home  -will give 0.25-0.5 ativan PRN for anxiety here  -psych consult on Monday for medical management of anxiety  -  Changed PRN ativan to standing 0.5 Ativan q8 (-) due to delay  overnight    ============================= RESPIRATORY =============================  Intubated for AHRF prior to [facility], extubated 11/3  - wean O2 as tolerated, currently on NC  - trend ABGs  - continue to monitor SpO2 with goal >94%  - COVID +  -watch off abx; vanc and cefepime ()  -MRSA swab neg  - Had episodes of desaturation  overnight     #R sided opacification on CXR   -improved today 11/3  -Possibly iso aspiration event prior to intubation  -watch off abx; vanc and cefepime ()  -MRSA swab neg  -sputum cx  no growth    #History of PNA  Pt initially had cough, congestion, fever, was placed on antibiotics on Juan 10/29  -CXR here clear  -s/p levaquin (10/29-10/31)  -if afebrile, remains w/o leukocytosis       #Asthma (hx of respiratory failure in 2018- intubated for 20 minutes per chart review pt report)  -c/w albuterol qd  -c/ed montelukast- does not take at home  -c/w home trelegy     ============================= CARDIOVASCULAR =============================    #Cardiogenic Shock requiring IABP (-) s/p unknown shockable event due to ischemia?  -  [facility]: pLAD 100 % in-stent restenosis & mRCA, 100 %. PCWP 30. +IABP.  -  TTE: LV dilated. EF 32 %. Regional WMAs present, mod (grade 2) LV diastolic dysfunction  -per echo areas of akinesis are not new compared to 2017, but EF is severely decreased   -currently on IABP 1:1, with augmentation of 130s, Mean 120s  -c/w nipride gtt for weaning of IABP  - Plan to d/c IABP  if BCx sent  are negative  -s/p levo  -daily CXR for IABP  -Keep right leg straight while sheaths/IABP/[place] are in position       #STEMI at OSH  -Trop 440-> 414  -EKG here w/ LBBB  -LHC : pLAD 70 % stenosis in the ostium portion of the segment and 100 % in-stent restenosis. mRCA, 100 % stenosis. RA 23, PA 51/33, wedge 31, PA sat 54%, CI 1.5  -s/p IABP  -c/w ASA, brillinta (takes plavix at home?)  -c/w lipitor 80  -c/w heparin gtt  -TTE : severely reduced EF, no new areas of akinesis  -CT surg not recommending CABG, instead AT eval  -viability study when IABP removed    #HFrEF w/ severely reduced EF 32%  TTE : LV moderately dilated. EF 32 %. Regional wall motion abnormalities present, mod (grade 2) LV diastolic dysfunction, with indeterminant filling pressure.   TTE : LV thrombus, EF of 22% w/ global hypokinesis  -s/p lasix 40  -continue management of shock as above  -start GDMT when stable  - BNP 4168 on admission  - CXR  clear   - tele  - S/p lasix 40 on admission  -presently euvolemic w/ CVP 6  - Strict I&O  - Daily weights  - Fluid restriction  - on ozempic and dapaglifozin at home    #LV thrombus  TTE : LV thrombus, EF of 22% w/ global hypokinesis  -c/w heparin gtt    #HTN  -hold home losartan 25, HCTZ 25, coreg 25 bid for now    ============================= RENAL =============================  Baseline Cr: 1-1  -trend Cr    #Metabolic acidosis  Likely 2/2 lactate of 3.7, due to cardiogenic shock  Improved  -trend lactate, blood gas  -IABP     ============================= GI =============================  #Transaminitis  Likely iso cardiogenic shock/abnormal shockable event  AST/ALT: 94/50 on admission  -trend LFTs    #Diet:   -passed dysphagia screen  -will advance diet to soft and bite sized    GERD  -changed PPI to qd    #Bowel regimen:   -miralax  -senna    ============================= ENDO =============================  #Type 2 DM  A1c 8.3  -TSH 0.50    METABOLIC:  -no active issues    ============================= HEMATOLOGIC =============================  H/H stable    #DVT prophylaxis with heparin    ============================= ID =============================  #Concern for aspiration event prior to intubation  R sided opacification on CXR , clear today 11/3  -Possibly iso aspiration event prior to intubation  -pt febrile to 100.6 today ()  -repeat blood cx today   -UA  neg  -f/u CXR  -per ID, no change in plan   -monitor off vanc and cefepime ()  -MRSA swab negative  -sputum cx  no growth  -blood cx  no growth    #History of PNA  Pt initially had cough, congestion, fever, was placed on antibiotics on Juan 10/29  -CXR  clear  -s/p levaquin (10/29-10/31)    Patient requires continuous monitoring with bedside rhythm monitoring, pulse ox monitoring, and intermittent blood gas analysis. Care plan discussed with ICU care team. Patient remained critical and at risk for life threatening decompensation.  Patient seen, examined and plan discussed with CCU team during rounds.     I have personally provided 35 minutes of critical care time excluding time spent on separate procedures, in addition to initial critical care time provided by the CICU Attending, Dr. Cunningham.    By signing my name below, I, Kalyn Sousa, attest that this documentation has been prepared under the direction and in the presence of Rita Hawthorne NP.  Electronically signed: Rosalba Booth, 23 @ 20:04    I, Rita Hawthorne , personally performed the services described in this documentation. all medical record entries made by the eileenibe were at my direction and in my presence. I have reviewed the chart and agree that the record reflects my personal performance and is accurate and complete  Electronically signed: Rita Hawthorne NP.

## 2023-11-05 NOTE — PROGRESS NOTE ADULT - ASSESSMENT
57 yo M with HTN, CAD (s/p PCI 2008), HFrEF (EF severely reduced 2018) not on GDMT, CVA 2018 (requiring tpA), DM presenting with c/o SOB for a week before presenting to the hospital. He had been treated for PNA. He was admitted with c/o chest pressure. Per family, patient passed out, EMS was called and reportedly defibrillated the patient. He was taken to [facility] where he was found to have ACS but he left AMA and came to [facility].    On arrival, ECG showed ST depression in lateral leads, no c/o chest pain. Pro-BNP 4k. Patient was intubated prior to [facility] on 11/1 for respiratory failure. LHC 11/1: pLAD 70 % stenosis in the ostium portion of the segment and 100 % in-stent restenosis. mRCA, 100 % stenosis. RA 23, PA 51/33, wedge 31, PA sat 54%, CI 1.5. S/p IABP on 11/1. He was successfully extubated to nasal canula on 11/3 and was also noted to be COVID positive currently with persistent fevers. Maintaining IABP for hemodynamic support in the setting of possible sepsis.       Cardiac Studies  11/1/23  [facility] showed pLAD 70 % stenosis in the ostium portion of the segment and 100 % in-stent restenosis and in mRCA, 100 % stenosis.   11/1/23 RHC; RA 23 Vw 24, PA 52/33/40, PCWP 30 Vw 33, AO 85/66/73, PA sat 54.4%, CO/CI (F) 2.96/1.44, IABP was placed during the cath through R common femoral artery.   11/1/23 TTE: LVIDd 6.4cm , LVEF 32%, regional WMA, grade II DD,  TAPSE 1.7cm, mld MR, trace TR,     Bedside hemodynamics  11/3: IABP 1:1 RA 5; PA 27/12/18; CO/CI 5.6/2.6; MAP 90-100s.  11/4/23 IABP 1:3 CVP 4 PA 37/18 SvO2 83.8 CO/CI 11.2 CI 5.1  (in the setting of fever to 38.3C)  11/5 IABP 1:1 CVP 3 PA 48/27 SvO2 78.4% CO 8.2 CI 3.7

## 2023-11-05 NOTE — PROGRESS NOTE ADULT - ASSESSMENT
====================ASSESSMENT ==============  59 yo M with HTN, CAD (s/p PCI 2008), HFrEF (EF severely reduced 2018) not on GDMT, CVA 2018 (requiring tpA), DM presenting with chest pressure and unknown tachycardia that was shocked x1, LHC 11/1 found to have chronic total occlusion of LAD and RCA, with elevated RA and PA pressures, echo 11/1 w/ severely decreased EF 32%, s/p IABP 11/1, likely in cardiogenic shock 2/2 acute on chronic HF s/p unknown shockable rhythm.    ============================= NEUROLOGY =============================  A&Ox3, s/p extubation 11/3    Hx of CVA requiring TPA c/b ICH  -no neuro deficits known    ============================= Anxiety =============================  Takes 0.5 ativan PRN at home  -will give 0.25-0.5 ativan PRN for anxiety here  -psych consult on Monday for medical management of anxiety  - 11/5 Changed PRN ativan to standing 0.5 Ativan q8 (11/5-11/8) due to delay 11/5 overnight    ============================= RESPIRATORY =============================  Intubated for AHRF prior to [facility], extubated 11/3  - wean O2 as tolerated, currently on NC  - trend ABGs  - continue to monitor SpO2 with goal >94%  - COVID +  -watch off abx; vanc and cefepime (11/2)  -MRSA swab neg  - Had episodes of desaturation 11/5 overnight     #R sided opacification on CXR 11/2  -improved today 11/3  -Possibly iso aspiration event prior to intubation  -watch off abx; vanc and cefepime (11/2)  -MRSA swab neg  -sputum cx 11/2 no growth    #History of PNA  Pt initially had cough, congestion, fever, was placed on antibiotics on Juan 10/29  -CXR here clear  -s/p levaquin (10/29-10/31)  -if afebrile, remains w/o leukocytosis       #Asthma (hx of respiratory failure in 2018- intubated for 20 minutes per chart review pt report)  -c/w albuterol qd  -c/ed montelukast- does not take at home  -c/w home trelegy     ============================= CARDIOVASCULAR =============================    #Cardiogenic Shock requiring IABP (11/1-) s/p unknown shockable event due to ischemia?  - 11/1 [facility]: pLAD 100 % in-stent restenosis & mRCA, 100 %. PCWP 30. +IABP.  - 11/1 TTE: LV dilated. EF 32 %. Regional WMAs present, mod (grade 2) LV diastolic dysfunction  -per echo areas of akinesis are not new compared to 2017, but EF is severely decreased   -currently on IABP 1:1, with augmentation of 130s, Mean 120s  -c/w nipride gtt for weaning of IABP  - Plan to d/c IABP 11/6 if BCx sent 11/4 are negative  -s/p levo  -daily CXR for IABP  -Keep right leg straight while sheaths/IABP/[place] are in position       #STEMI at OSH  -Trop 440-> 414  -EKG here w/ LBBB  -[facility] 11/1: pLAD 70 % stenosis in the ostium portion of the segment and 100 % in-stent restenosis. mRCA, 100 % stenosis. RA 23, PA 51/33, wedge 31, PA sat 54%, CI 1.5  -s/p IABP  -c/w ASA, brillinta (takes plavix at home?)  -c/w lipitor 80  -c/w heparin gtt  -TTE 11/1: severely reduced EF, no new areas of akinesis  -CT surg not recommending CABG, instead AT eval  -viability study when IABP removed    #HFrEF w/ severely reduced EF 32%  TTE 11/1: LV moderately dilated. EF 32 %. Regional wall motion abnormalities present, mod (grade 2) LV diastolic dysfunction, with indeterminant filling pressure.   TTE 11/2: LV thrombus, EF of 22% w/ global hypokinesis  -s/p lasix 40  -continue management of shock as above  -start GDMT when stable  - BNP 4168 on admission  - CXR 11/1 clear   - tele  - S/p lasix 40 on admission  -presently euvolemic w/ CVP 6  - Strict I&O  - Daily weights  - Fluid restriction  - on ozempic and dapaglifozin at home    #LV thrombus  TTE 11/2: LV thrombus, EF of 22% w/ global hypokinesis  -c/w heparin gtt    #HTN  -hold home losartan 25, HCTZ 25, coreg 25 bid for now    ============================= RENAL =============================  Baseline Cr: 1-1.22  -trend Cr    #Metabolic acidosis  Likely 2/2 lactate of 3.7, due to cardiogenic shock  Improved  -trend lactate, blood gas  -IABP     ============================= GI =============================  #Transaminitis  Likely iso cardiogenic shock/abnormal shockable event  AST/ALT: 94/50 on admission  -trend LFTs    #Diet:   -passed dysphagia screen  -will advance diet to soft and bite sized    GERD  -changed PPI to qd    #Bowel regimen:   -miralax  -senna    ============================= ENDO =============================  #Type 2 DM  A1c 8.3  -TSH 0.50    METABOLIC:  -no active issues    ============================= HEMATOLOGIC =============================  H/H stable    #DVT prophylaxis with heparin    ============================= ID =============================  #Concern for aspiration event prior to intubation  R sided opacification on CXR 11/2, clear today 11/3  -Possibly iso aspiration event prior to intubation  -pt febrile to 100.6 today (11/4)  -repeat blood cx today 11/4  -UA 11/4 neg  -f/u CXR  -per ID, no change in plan   -monitor off vanc and cefepime (11/2)  -MRSA swab negative  -sputum cx 11/2 no growth  -blood cx 11/2 no growth    #History of PNA  Pt initially had cough, congestion, fever, was placed on antibiotics on Juan 10/29  -CXR 11/1 clear  -s/p levaquin (10/29-10/31)

## 2023-11-05 NOTE — PROGRESS NOTE ADULT - PROBLEM SELECTOR PLAN 1
- Currently on IABP 1:1 (favour keeping IABP in for at least 1 more day can tentatively plan to d/c on 11/6)  - Inotrope: N/A  - IV nitroprusside 0.5mcg/kg/min (wean off IV nipride)  - Aldactone 25mg daily  - PRN Diuresis CVP goal <10  - Trend perfusion markers daily, MVO2, BMP, Lactate and LFT's  - Strict I/O's.

## 2023-11-05 NOTE — PROGRESS NOTE ADULT - ATTENDING COMMENTS
Patient is a 57 yo M with HTN, CAD (s/p PCI 2008), HFrEF (EF severely reduced 2018) not on GDMT, CVA 2018 (requiring tpA), DM presenting with chest pressure - with course c/b acute systolic heart failure exacerbation requiring urgent intubation prior to his right/left heart cath    LHC 11/1 found to have chronic total occlusion of LAD and RCA  RHC with elevated RA and PA pressures and CI 1.14 - s/p IABP for mechanical support  SHOCK call was initiated - based upon objective data at that time it was decided to continue with the current course - intubation/IABP/diuresis  echo 11/1 w/ severely decreased EF 32%, s/p IABP 11/1, likely in cardiogenic shock 2/2 acute on chronic HF   pt was found to have a COVID infection simultaneously as well - has been having fevers - blood cultures sent  MRI viability study will be done once the IABP is removed - on hydralazine and nipride for afterload reduction to prep for IABP wean  SBT trial today to extubate - successful extubation 11/3    Patient is critically ill, requiring critical care services. Despite the current condition, the patient is at a high risk of clinical and hemodynamic demise

## 2023-11-05 NOTE — PROGRESS NOTE ADULT - SUBJECTIVE AND OBJECTIVE BOX
Subjective    Overnight has contiued to have fevers. Patient seen and examined at bedside reports feeling better today than he did yesterday though is very anxious.     Current Meds:  albuterol    0.083% 2.5 milliGRAM(s) Nebulizer daily  aspirin  chewable 81 milliGRAM(s) Oral daily  atorvastatin 80 milliGRAM(s) Oral at bedtime  budesonide  80 MICROgram(s)/formoterol 4.5 MICROgram(s) Inhaler 2 Puff(s) Inhalation two times a day  chlorhexidine 2% Cloths 1 Application(s) Topical daily  dexMEDEtomidine Infusion 0.5 MICROgram(s)/kG/Hr IV Continuous <Continuous>  dextrose 50% Injectable 25 Gram(s) IV Push once  dextrose 50% Injectable 12.5 Gram(s) IV Push once  dextrose 50% Injectable 25 Gram(s) IV Push once  dextrose Oral Gel 15 Gram(s) Oral once PRN  glucagon  Injectable 1 milliGRAM(s) IntraMuscular once  heparin  Infusion 950 Unit(s)/Hr IV Continuous <Continuous>  hydrALAZINE 20 milliGRAM(s) Oral every 8 hours  insulin glargine Injectable (LANTUS) 8 Unit(s) SubCutaneous at bedtime  insulin lispro (ADMELOG) corrective regimen sliding scale   SubCutaneous three times a day before meals  insulin lispro (ADMELOG) corrective regimen sliding scale   SubCutaneous at bedtime  isosorbide   dinitrate Tablet (ISORDIL) 10 milliGRAM(s) Oral three times a day  nitroprusside Infusion 0.5 MICROgram(s)/kG/Min IV Continuous <Continuous>  pantoprazole    Tablet 40 milliGRAM(s) Oral before breakfast  polyethylene glycol 3350 17 Gram(s) Oral daily  spironolactone 25 milliGRAM(s) Oral daily  ticagrelor 90 milliGRAM(s) Oral every 12 hours  tiotropium 2.5 MICROgram(s) Inhaler 2 Puff(s) Inhalation daily    Vitals:  T(F): 100.2 (11-05), Max: 100.9 (11-04)  HR: 97 (11-05) (96 - 123)  BP: --  RR: 21 (11-05)  SpO2: 95% (11-05)  I&O's Summary    04 Nov 2023 08:01  -  05 Nov 2023 07:00  --------------------------------------------------------  IN: 3048.2 mL / OUT: 3270 mL / NET: -221.8 mL    05 Nov 2023 07:01  -  05 Nov 2023 08:57  --------------------------------------------------------  IN: 31.3 mL / OUT: 0 mL / NET: 31.3 mL    Physical Exam:  General: No acute distress; well appearing  Cardiovascular: RRR, S1, S2, no murmurs, rubs, or gallops; no edema; no JVD  Respiratory: Clear to auscultation bilaterally, speaking full sentences  Gastrointestinal: soft, non-tender, non-distended with normal bowel sounds  Extremities: 2+ pulses, no clubbing; no joint deformity, or edema, IABP via R.fem  Neurologic: AAOx3,  Venry anxious  Skin: No rashes, ecchymoses, or cyanosis                          12.5   10.01 )-----------( 126      ( 05 Nov 2023 00:31 )             36.6     11-05    133<L>  |  101  |  11  ----------------------------<  184<H>  4.1   |  18<L>  |  1.19    Ca    8.5      05 Nov 2023 00:31  Phos  3.0     11-05  Mg     2.0     11-05    TPro  5.9<L>  /  Alb  3.0<L>  /  TBili  0.5  /  DBili  x   /  AST  46<H>  /  ALT  33  /  AlkPhos  46  11-05    PTT - ( 05 Nov 2023 00:32 )  PTT:46.0 sec  CARDIAC MARKERS ( 03 Nov 2023 01:49 )  1226 ng/L / x     / x     / x     / x     / x      CARDIAC MARKERS ( 01 Nov 2023 07:51 )  414 ng/L / x     / x     / x     / x     / x      CARDIAC MARKERS ( 01 Nov 2023 06:14 )  440 ng/L / x     / x     / x     / x     / x       Subjective    Overnight has continued to have fevers. Patient seen and examined at bedside reports feeling better today than he did yesterday though is very anxious.     Current Meds:  albuterol    0.083% 2.5 milliGRAM(s) Nebulizer daily  aspirin  chewable 81 milliGRAM(s) Oral daily  atorvastatin 80 milliGRAM(s) Oral at bedtime  budesonide  80 MICROgram(s)/formoterol 4.5 MICROgram(s) Inhaler 2 Puff(s) Inhalation two times a day  chlorhexidine 2% Cloths 1 Application(s) Topical daily  dexMEDEtomidine Infusion 0.5 MICROgram(s)/kG/Hr IV Continuous <Continuous>  dextrose 50% Injectable 25 Gram(s) IV Push once  dextrose 50% Injectable 12.5 Gram(s) IV Push once  dextrose 50% Injectable 25 Gram(s) IV Push once  dextrose Oral Gel 15 Gram(s) Oral once PRN  glucagon  Injectable 1 milliGRAM(s) IntraMuscular once  heparin  Infusion 950 Unit(s)/Hr IV Continuous <Continuous>  hydrALAZINE 20 milliGRAM(s) Oral every 8 hours  insulin glargine Injectable (LANTUS) 8 Unit(s) SubCutaneous at bedtime  insulin lispro (ADMELOG) corrective regimen sliding scale   SubCutaneous three times a day before meals  insulin lispro (ADMELOG) corrective regimen sliding scale   SubCutaneous at bedtime  isosorbide   dinitrate Tablet (ISORDIL) 10 milliGRAM(s) Oral three times a day  nitroprusside Infusion 0.5 MICROgram(s)/kG/Min IV Continuous <Continuous>  pantoprazole    Tablet 40 milliGRAM(s) Oral before breakfast  polyethylene glycol 3350 17 Gram(s) Oral daily  spironolactone 25 milliGRAM(s) Oral daily  ticagrelor 90 milliGRAM(s) Oral every 12 hours  tiotropium 2.5 MICROgram(s) Inhaler 2 Puff(s) Inhalation daily    Vitals:  T(F): 100.2 (11-05), Max: 100.9 (11-04)  HR: 97 (11-05) (96 - 123)  BP: --  RR: 21 (11-05)  SpO2: 95% (11-05)  I&O's Summary    04 Nov 2023 08:01  -  05 Nov 2023 07:00  --------------------------------------------------------  IN: 3048.2 mL / OUT: 3270 mL / NET: -221.8 mL    05 Nov 2023 07:01  -  05 Nov 2023 08:57  --------------------------------------------------------  IN: 31.3 mL / OUT: 0 mL / NET: 31.3 mL    Physical Exam:  General: No acute distress; well appearing  Cardiovascular: RRR, S1, S2, no murmurs, rubs, or gallops; no edema; no JVD  Respiratory: Clear to auscultation bilaterally, speaking full sentences  Gastrointestinal: soft, non-tender, non-distended with normal bowel sounds  Extremities: 2+ pulses, no clubbing; no joint deformity, or edema, IABP via R.fem  Neurologic: AAOx3,  Venry anxious  Skin: No rashes, ecchymoses, or cyanosis                          12.5   10.01 )-----------( 126      ( 05 Nov 2023 00:31 )             36.6     11-05    133<L>  |  101  |  11  ----------------------------<  184<H>  4.1   |  18<L>  |  1.19    Ca    8.5      05 Nov 2023 00:31  Phos  3.0     11-05  Mg     2.0     11-05    TPro  5.9<L>  /  Alb  3.0<L>  /  TBili  0.5  /  DBili  x   /  AST  46<H>  /  ALT  33  /  AlkPhos  46  11-05    PTT - ( 05 Nov 2023 00:32 )  PTT:46.0 sec  CARDIAC MARKERS ( 03 Nov 2023 01:49 )  1226 ng/L / x     / x     / x     / x     / x      CARDIAC MARKERS ( 01 Nov 2023 07:51 )  414 ng/L / x     / x     / x     / x     / x      CARDIAC MARKERS ( 01 Nov 2023 06:14 )  440 ng/L / x     / x     / x     / x     / x

## 2023-11-05 NOTE — PROGRESS NOTE ADULT - SUBJECTIVE AND OBJECTIVE BOX
Patient is a 58y old  Male who presents with a chief complaint of Cardiogenic shock (04 Nov 2023 21:28)    HPI:  pt seen and approx 1:30 pm  in [facility]    59yo M w/ hx HTN, CAD w/ 1 stent in 2009, ICH (2008) presenting with abn ekg. Patient presented to [facility] where he was found to have STEMI, recommended to get cath however patient did not want to get it there so it left and came here.  Patient initially had cough, congestion, fever, was placed on antibiotics on Sunday.  Started feeling nauseous and had a presyncopal event after which he presented to ED last night.  Had chest pain as well.  Chest pain is midsternal.  Not currently having chest pain.  Received 4 aspirin 30 min pta. (01 Nov 2023 15:11)       INTERVAL HPI/OVERNIGHT EVENTS:   Patient had episodes of symptomatic NSVT with associated hypotension.     Subjective:    ICU Vital Signs Last 24 Hrs  T(C): 38.3 (04 Nov 2023 20:00), Max: 38.3 (04 Nov 2023 20:00)  T(F): 100.9 (04 Nov 2023 20:00), Max: 100.9 (04 Nov 2023 20:00)  HR: 98 (05 Nov 2023 07:00) (98 - 123)  BP: --  BP(mean): --  ABP: 81/55 (05 Nov 2023 07:00) (81/55 - 164/79)  ABP(mean): 69 (05 Nov 2023 07:00) (68 - 116)  RR: 18 (05 Nov 2023 07:00) (8 - 31)  SpO2: 94% (05 Nov 2023 07:00) (91% - 97%)    O2 Parameters below as of 05 Nov 2023 04:00  Patient On (Oxygen Delivery Method): nasal cannula w/ humidification  O2 Flow (L/min): 3        I&O's Summary    04 Nov 2023 08:01  -  05 Nov 2023 07:00  --------------------------------------------------------  IN: 3048.2 mL / OUT: 3270 mL / NET: -221.8 mL          Daily     Daily     Adult Advanced Hemodynamics Last 24 Hrs  CVP(mm Hg): 8 (05 Nov 2023 07:00) (0 - 16)  CVP(cm H2O): --  CO: --  CI: --  PA: 34/19 (05 Nov 2023 07:00) (19/1 - 58/36)  PA(mean): 24 (05 Nov 2023 07:00) (13 - 48)  PCWP: --  SVR: --  SVRI: --  PVR: --  PVRI: --    EKG/Telemetry Events:    MEDICATIONS  (STANDING):  albuterol    0.083% 2.5 milliGRAM(s) Nebulizer daily  aspirin  chewable 81 milliGRAM(s) Oral daily  atorvastatin 80 milliGRAM(s) Oral at bedtime  budesonide  80 MICROgram(s)/formoterol 4.5 MICROgram(s) Inhaler 2 Puff(s) Inhalation two times a day  chlorhexidine 2% Cloths 1 Application(s) Topical daily  dexMEDEtomidine Infusion 0.5 MICROgram(s)/kG/Hr (12.4 mL/Hr) IV Continuous <Continuous>  dextrose 50% Injectable 25 Gram(s) IV Push once  dextrose 50% Injectable 12.5 Gram(s) IV Push once  dextrose 50% Injectable 25 Gram(s) IV Push once  glucagon  Injectable 1 milliGRAM(s) IntraMuscular once  heparin  Infusion 950 Unit(s)/Hr (13 mL/Hr) IV Continuous <Continuous>  hydrALAZINE 20 milliGRAM(s) Oral every 8 hours  insulin glargine Injectable (LANTUS) 8 Unit(s) SubCutaneous at bedtime  insulin lispro (ADMELOG) corrective regimen sliding scale   SubCutaneous three times a day before meals  insulin lispro (ADMELOG) corrective regimen sliding scale   SubCutaneous at bedtime  isosorbide   dinitrate Tablet (ISORDIL) 10 milliGRAM(s) Oral three times a day  nitroprusside Infusion 0.5 MICROgram(s)/kG/Min (7.42 mL/Hr) IV Continuous <Continuous>  pantoprazole    Tablet 40 milliGRAM(s) Oral before breakfast  polyethylene glycol 3350 17 Gram(s) Oral daily  spironolactone 25 milliGRAM(s) Oral daily  ticagrelor 90 milliGRAM(s) Oral every 12 hours  tiotropium 2.5 MICROgram(s) Inhaler 2 Puff(s) Inhalation daily    MEDICATIONS  (PRN):  dextrose Oral Gel 15 Gram(s) Oral once PRN Blood Glucose LESS THAN 70 milliGRAM(s)/deciliter      PHYSICAL EXAM:  GENERAL:   HEAD:  Atraumatic, Normocephalic  EYES: EOMI, PERRLA, conjunctiva and sclera clear  NECK: Supple, No JVD, Normal thyroid, no enlarged nodes  NERVOUS SYSTEM:  Alert & Awake.   CHEST/LUNG: B/L good air entry; No rales, rhonchi, or wheezing  HEART: S1S2 normal, no S3, Regular rate and rhythm; No murmurs  ABDOMEN: Soft, Nontender, Nondistended; Bowel sounds present  EXTREMITIES:  2+ Peripheral Pulses, No clubbing, cyanosis, or edema  LYMPH: No lymphadenopathy noted  SKIN: No rashes or lesions    LABS:                        12.5   10.01 )-----------( 126      ( 05 Nov 2023 00:31 )             36.6     11-05    133<L>  |  101  |  11  ----------------------------<  184<H>  4.1   |  18<L>  |  1.19    Ca    8.5      05 Nov 2023 00:31  Phos  3.0     11-05  Mg     2.0     11-05    TPro  5.9<L>  /  Alb  3.0<L>  /  TBili  0.5  /  DBili  x   /  AST  46<H>  /  ALT  33  /  AlkPhos  46  11-05    LIVER FUNCTIONS - ( 05 Nov 2023 00:31 )  Alb: 3.0 g/dL / Pro: 5.9 g/dL / ALK PHOS: 46 U/L / ALT: 33 U/L / AST: 46 U/L / GGT: x           PTT - ( 05 Nov 2023 00:32 )  PTT:46.0 sec  CAPILLARY BLOOD GLUCOSE      POCT Blood Glucose.: 195 mg/dL (04 Nov 2023 21:46)  POCT Blood Glucose.: 208 mg/dL (04 Nov 2023 16:35)  POCT Blood Glucose.: 157 mg/dL (04 Nov 2023 12:10)  POCT Blood Glucose.: 130 mg/dL (04 Nov 2023 08:35)    ABG - ( 05 Nov 2023 00:00 )  pH, Arterial: 7.47  pH, Blood: x     /  pCO2: 32    /  pO2: 140   / HCO3: 23    / Base Excess: 0.3   /  SaO2: 98.5                    Urinalysis Basic - ( 05 Nov 2023 00:31 )    Color: x / Appearance: x / SG: x / pH: x  Gluc: 184 mg/dL / Ketone: x  / Bili: x / Urobili: x   Blood: x / Protein: x / Nitrite: x   Leuk Esterase: x / RBC: x / WBC x   Sq Epi: x / Non Sq Epi: x / Bacteria: x          RADIOLOGY & ADDITIONAL TESTS:  CXR:        Care Discussed with Consultants/Other Providers [ x] YES  [ ] NO           Patient is a 58y old  Male who presents with a chief complaint of Cardiogenic shock (04 Nov 2023 21:28)    HPI:  pt seen and approx 1:30 pm  in [facility]    59yo M w/ hx HTN, CAD w/ 1 stent in 2009, ICH (2008) presenting with abn ekg. Patient presented to [facility] where he was found to have STEMI, recommended to get cath however patient did not want to get it there so it left and came here.  Patient initially had cough, congestion, fever, was placed on antibiotics on Sunday.  Started feeling nauseous and had a presyncopal event after which he presented to ED last night.  Had chest pain as well.  Chest pain is midsternal.  Not currently having chest pain.  Received 4 aspirin 30 min pta. (01 Nov 2023 15:11)       INTERVAL HPI/OVERNIGHT EVENTS:   Patient has had episodes of hypoxia and was escalated to nonrebreather. Tmax of 100.9     Subjective:    ICU Vital Signs Last 24 Hrs  T(C): 38.3 (04 Nov 2023 20:00), Max: 38.3 (04 Nov 2023 20:00)  T(F): 100.9 (04 Nov 2023 20:00), Max: 100.9 (04 Nov 2023 20:00)  HR: 98 (05 Nov 2023 07:00) (98 - 123)  BP: --  BP(mean): --  ABP: 81/55 (05 Nov 2023 07:00) (81/55 - 164/79)  ABP(mean): 69 (05 Nov 2023 07:00) (68 - 116)  RR: 18 (05 Nov 2023 07:00) (8 - 31)  SpO2: 94% (05 Nov 2023 07:00) (91% - 97%)    O2 Parameters below as of 05 Nov 2023 04:00  Patient On (Oxygen Delivery Method): nasal cannula w/ humidification  O2 Flow (L/min): 3        I&O's Summary    04 Nov 2023 08:01  -  05 Nov 2023 07:00  --------------------------------------------------------  IN: 3048.2 mL / OUT: 3270 mL / NET: -221.8 mL          Daily     Daily     Adult Advanced Hemodynamics Last 24 Hrs  CVP(mm Hg): 8 (05 Nov 2023 07:00) (0 - 16)  CVP(cm H2O): --  CO: --  CI: --  PA: 34/19 (05 Nov 2023 07:00) (19/1 - 58/36)  PA(mean): 24 (05 Nov 2023 07:00) (13 - 48)  PCWP: --  SVR: --  SVRI: --  PVR: --  PVRI: --    EKG/Telemetry Events:    MEDICATIONS  (STANDING):  albuterol    0.083% 2.5 milliGRAM(s) Nebulizer daily  aspirin  chewable 81 milliGRAM(s) Oral daily  atorvastatin 80 milliGRAM(s) Oral at bedtime  budesonide  80 MICROgram(s)/formoterol 4.5 MICROgram(s) Inhaler 2 Puff(s) Inhalation two times a day  chlorhexidine 2% Cloths 1 Application(s) Topical daily  dexMEDEtomidine Infusion 0.5 MICROgram(s)/kG/Hr (12.4 mL/Hr) IV Continuous <Continuous>  dextrose 50% Injectable 25 Gram(s) IV Push once  dextrose 50% Injectable 12.5 Gram(s) IV Push once  dextrose 50% Injectable 25 Gram(s) IV Push once  glucagon  Injectable 1 milliGRAM(s) IntraMuscular once  heparin  Infusion 950 Unit(s)/Hr (13 mL/Hr) IV Continuous <Continuous>  hydrALAZINE 20 milliGRAM(s) Oral every 8 hours  insulin glargine Injectable (LANTUS) 8 Unit(s) SubCutaneous at bedtime  insulin lispro (ADMELOG) corrective regimen sliding scale   SubCutaneous three times a day before meals  insulin lispro (ADMELOG) corrective regimen sliding scale   SubCutaneous at bedtime  isosorbide   dinitrate Tablet (ISORDIL) 10 milliGRAM(s) Oral three times a day  nitroprusside Infusion 0.5 MICROgram(s)/kG/Min (7.42 mL/Hr) IV Continuous <Continuous>  pantoprazole    Tablet 40 milliGRAM(s) Oral before breakfast  polyethylene glycol 3350 17 Gram(s) Oral daily  spironolactone 25 milliGRAM(s) Oral daily  ticagrelor 90 milliGRAM(s) Oral every 12 hours  tiotropium 2.5 MICROgram(s) Inhaler 2 Puff(s) Inhalation daily    MEDICATIONS  (PRN):  dextrose Oral Gel 15 Gram(s) Oral once PRN Blood Glucose LESS THAN 70 milliGRAM(s)/deciliter      PHYSICAL EXAM:  GENERAL:   HEAD:  Atraumatic, Normocephalic  EYES: EOMI, PERRLA, conjunctiva and sclera clear  NECK: Supple, No JVD, Normal thyroid, no enlarged nodes  NERVOUS SYSTEM:  Alert & Awake.   CHEST/LUNG: B/L good air entry; No rales, rhonchi, or wheezing  HEART: S1S2 normal, no S3, Regular rate and rhythm; No murmurs  ABDOMEN: Soft, Nontender, Nondistended; Bowel sounds present  EXTREMITIES:  2+ Peripheral Pulses, No clubbing, cyanosis, or edema  LYMPH: No lymphadenopathy noted  SKIN: No rashes or lesions    LABS:                        12.5   10.01 )-----------( 126      ( 05 Nov 2023 00:31 )             36.6     11-05    133<L>  |  101  |  11  ----------------------------<  184<H>  4.1   |  18<L>  |  1.19    Ca    8.5      05 Nov 2023 00:31  Phos  3.0     11-05  Mg     2.0     11-05    TPro  5.9<L>  /  Alb  3.0<L>  /  TBili  0.5  /  DBili  x   /  AST  46<H>  /  ALT  33  /  AlkPhos  46  11-05    LIVER FUNCTIONS - ( 05 Nov 2023 00:31 )  Alb: 3.0 g/dL / Pro: 5.9 g/dL / ALK PHOS: 46 U/L / ALT: 33 U/L / AST: 46 U/L / GGT: x           PTT - ( 05 Nov 2023 00:32 )  PTT:46.0 sec  CAPILLARY BLOOD GLUCOSE      POCT Blood Glucose.: 195 mg/dL (04 Nov 2023 21:46)  POCT Blood Glucose.: 208 mg/dL (04 Nov 2023 16:35)  POCT Blood Glucose.: 157 mg/dL (04 Nov 2023 12:10)  POCT Blood Glucose.: 130 mg/dL (04 Nov 2023 08:35)    ABG - ( 05 Nov 2023 00:00 )  pH, Arterial: 7.47  pH, Blood: x     /  pCO2: 32    /  pO2: 140   / HCO3: 23    / Base Excess: 0.3   /  SaO2: 98.5                    Urinalysis Basic - ( 05 Nov 2023 00:31 )    Color: x / Appearance: x / SG: x / pH: x  Gluc: 184 mg/dL / Ketone: x  / Bili: x / Urobili: x   Blood: x / Protein: x / Nitrite: x   Leuk Esterase: x / RBC: x / WBC x   Sq Epi: x / Non Sq Epi: x / Bacteria: x          RADIOLOGY & ADDITIONAL TESTS:  CXR:        Care Discussed with Consultants/Other Providers [ x] YES  [ ] NO           Patient is a 58y old  Male who presents with a chief complaint of Cardiogenic shock (04 Nov 2023 21:28)    HPI:  pt seen and approx 1:30 pm  in [facility]    59yo M w/ hx HTN, CAD w/ 1 stent in 2009, ICH (2008) presenting with abn ekg. Patient presented to [facility] where he was found to have STEMI, recommended to get cath however patient did not want to get it there so it left and came here.  Patient initially had cough, congestion, fever, was placed on antibiotics on Sunday.  Started feeling nauseous and had a presyncopal event after which he presented to ED last night.  Had chest pain as well.  Chest pain is midsternal.  Not currently having chest pain.  Received 4 aspirin 30 min pta. (01 Nov 2023 15:11)       INTERVAL HPI/OVERNIGHT EVENTS:   Patient has had episodes of hypoxia and was escalated to nonrebreather. Tmax of 100.9 which was treated with tylenol 325 PO. Patient experienced an anxiety attack in the morning due to a delay in obtaining his prn ativan.     Subjective:  Pt reports no acute complaints. He is upset about the delay in care in terms of his anxiety attack.     ICU Vital Signs Last 24 Hrs  T(C): 38.3 (04 Nov 2023 20:00), Max: 38.3 (04 Nov 2023 20:00)  T(F): 100.9 (04 Nov 2023 20:00), Max: 100.9 (04 Nov 2023 20:00)  HR: 98 (05 Nov 2023 07:00) (98 - 123)  BP: --  BP(mean): --  ABP: 81/55 (05 Nov 2023 07:00) (81/55 - 164/79)  ABP(mean): 69 (05 Nov 2023 07:00) (68 - 116)  RR: 18 (05 Nov 2023 07:00) (8 - 31)  SpO2: 94% (05 Nov 2023 07:00) (91% - 97%)    O2 Parameters below as of 05 Nov 2023 04:00  Patient On (Oxygen Delivery Method): nasal cannula w/ humidification  O2 Flow (L/min): 3        I&O's Summary    04 Nov 2023 08:01  -  05 Nov 2023 07:00  --------------------------------------------------------  IN: 3048.2 mL / OUT: 3270 mL / NET: -221.8 mL          Daily     Daily     Adult Advanced Hemodynamics Last 24 Hrs  CVP(mm Hg): 8 (05 Nov 2023 07:00) (0 - 16)  CVP(cm H2O): --  CO: --  CI: --  PA: 34/19 (05 Nov 2023 07:00) (19/1 - 58/36)  PA(mean): 24 (05 Nov 2023 07:00) (13 - 48)  PCWP: --  SVR: --  SVRI: --  PVR: --  PVRI: --    EKG/Telemetry Events:    MEDICATIONS  (STANDING):  albuterol    0.083% 2.5 milliGRAM(s) Nebulizer daily  aspirin  chewable 81 milliGRAM(s) Oral daily  atorvastatin 80 milliGRAM(s) Oral at bedtime  budesonide  80 MICROgram(s)/formoterol 4.5 MICROgram(s) Inhaler 2 Puff(s) Inhalation two times a day  chlorhexidine 2% Cloths 1 Application(s) Topical daily  dexMEDEtomidine Infusion 0.5 MICROgram(s)/kG/Hr (12.4 mL/Hr) IV Continuous <Continuous>  dextrose 50% Injectable 25 Gram(s) IV Push once  dextrose 50% Injectable 12.5 Gram(s) IV Push once  dextrose 50% Injectable 25 Gram(s) IV Push once  glucagon  Injectable 1 milliGRAM(s) IntraMuscular once  heparin  Infusion 950 Unit(s)/Hr (13 mL/Hr) IV Continuous <Continuous>  hydrALAZINE 20 milliGRAM(s) Oral every 8 hours  insulin glargine Injectable (LANTUS) 8 Unit(s) SubCutaneous at bedtime  insulin lispro (ADMELOG) corrective regimen sliding scale   SubCutaneous three times a day before meals  insulin lispro (ADMELOG) corrective regimen sliding scale   SubCutaneous at bedtime  isosorbide   dinitrate Tablet (ISORDIL) 10 milliGRAM(s) Oral three times a day  nitroprusside Infusion 0.5 MICROgram(s)/kG/Min (7.42 mL/Hr) IV Continuous <Continuous>  pantoprazole    Tablet 40 milliGRAM(s) Oral before breakfast  polyethylene glycol 3350 17 Gram(s) Oral daily  spironolactone 25 milliGRAM(s) Oral daily  ticagrelor 90 milliGRAM(s) Oral every 12 hours  tiotropium 2.5 MICROgram(s) Inhaler 2 Puff(s) Inhalation daily    MEDICATIONS  (PRN):  dextrose Oral Gel 15 Gram(s) Oral once PRN Blood Glucose LESS THAN 70 milliGRAM(s)/deciliter      PHYSICAL EXAM:  GENERAL:   HEAD:  Atraumatic, Normocephalic  EYES: EOMI, PERRLA, conjunctiva and sclera clear  NECK: Supple, No JVD,   NERVOUS SYSTEM:  Alert & Awake.   ABDOMEN: Soft, Nontender, Nondistended;  EXTREMITIES:  2+ Peripheral Pulses, No clubbing, cyanosis, or edema      LABS:                        12.5   10.01 )-----------( 126      ( 05 Nov 2023 00:31 )             36.6     11-05    133<L>  |  101  |  11  ----------------------------<  184<H>  4.1   |  18<L>  |  1.19    Ca    8.5      05 Nov 2023 00:31  Phos  3.0     11-05  Mg     2.0     11-05    TPro  5.9<L>  /  Alb  3.0<L>  /  TBili  0.5  /  DBili  x   /  AST  46<H>  /  ALT  33  /  AlkPhos  46  11-05    LIVER FUNCTIONS - ( 05 Nov 2023 00:31 )  Alb: 3.0 g/dL / Pro: 5.9 g/dL / ALK PHOS: 46 U/L / ALT: 33 U/L / AST: 46 U/L / GGT: x           PTT - ( 05 Nov 2023 00:32 )  PTT:46.0 sec  CAPILLARY BLOOD GLUCOSE      POCT Blood Glucose.: 195 mg/dL (04 Nov 2023 21:46)  POCT Blood Glucose.: 208 mg/dL (04 Nov 2023 16:35)  POCT Blood Glucose.: 157 mg/dL (04 Nov 2023 12:10)  POCT Blood Glucose.: 130 mg/dL (04 Nov 2023 08:35)    ABG - ( 05 Nov 2023 00:00 )  pH, Arterial: 7.47  pH, Blood: x     /  pCO2: 32    /  pO2: 140   / HCO3: 23    / Base Excess: 0.3   /  SaO2: 98.5                    Urinalysis Basic - ( 05 Nov 2023 00:31 )    Color: x / Appearance: x / SG: x / pH: x  Gluc: 184 mg/dL / Ketone: x  / Bili: x / Urobili: x   Blood: x / Protein: x / Nitrite: x   Leuk Esterase: x / RBC: x / WBC x   Sq Epi: x / Non Sq Epi: x / Bacteria: x          RADIOLOGY & ADDITIONAL TESTS:  CXR:        Care Discussed with Consultants/Other Providers [ x] YES  [ ] NO

## 2023-11-05 NOTE — PROGRESS NOTE ADULT - SUBJECTIVE AND OBJECTIVE BOX
LD VALENCIA  58y Male  MRN:[id number]    Patient is a 58y old  Male who presents with a chief complaint of NSTEMI (01 Nov 2023 20:29)    HPI:  59yo M w/ hx HTN, CAD w/ 1 stent in 2009, ICH (2008) presenting with abn ekg. Patient presented to [facility] where he was found to have STEMI, recommended to get cath however patient did not want to get it there so it left and came here.  Patient initially had cough, congestion, fever, was placed on antibiotics on Sunday.  Started feeling nauseous and had a presyncopal event after which he presented to ED last night.  Had chest pain as well.  Chest pain is midsternal.  Not currently having chest pain.  Received 4 aspirin 30 min pta. (01 Nov 2023 15:11)      Patient seen and evaluated at bedside in CCU. interval events noted    Interval HPI: febrile     PAST MEDICAL & SURGICAL HISTORY:  HTN (hypertension)      CAD (coronary artery disease)  2009; stent      Intracranial hemorrhage  2008      Respiratory arrest  december 1st      Myocardial infarction, unspecified MI type, unspecified artery      History of coronary artery stent placement          REVIEW OF SYSTEMS:  as per hpi     VITALS:   ICU Vital Signs Last 24 Hrs  T(C): 37.9 (05 Nov 2023 08:00), Max: 38.3 (04 Nov 2023 20:00)  T(F): 100.2 (05 Nov 2023 08:00), Max: 100.9 (04 Nov 2023 20:00)  HR: 97 (05 Nov 2023 10:00) (95 - 123)  BP: --  BP(mean): --  ABP: 121/70 (05 Nov 2023 10:00) (77/50 - 160/80)  ABP(mean): 95 (05 Nov 2023 10:00) (62 - 115)  RR: 23 (05 Nov 2023 10:00) (8 - 31)  SpO2: 94% (05 Nov 2023 10:00) (91% - 97%)    O2 Parameters below as of 05 Nov 2023 08:00  Patient On (Oxygen Delivery Method): nasal cannula  O2 Flow (L/min): 3           PHYSICAL EXAM:  GENERAL: NAD, well-developed  HEAD:  Atraumatic, Normocephalic  EYES: EOMI, PERRLA, conjunctiva and sclera clear  NECK: Supple, No JVD  CHEST/LUNG: Clear to auscultation bilaterally; No wheeze  HEART: Regular rate and rhythm; No murmurs, rubs, or gallops  ABDOMEN: Soft, Nontender, Nondistended; Bowel sounds present  EXTREMITIES:  2+ Peripheral Pulses, No clubbing, cyanosis, or edema  PSYCH: AAOx3  NEUROLOGY: non-focal  SKIN: No rashes or lesions    Consultant(s) Notes Reviewed:  [x ] YES  [ ] NO  Care Discussed with Consultants/Other Providers [ x] YES  [ ] NO    MEDS:   MEDICATIONS  (STANDING):  albuterol    0.083% 2.5 milliGRAM(s) Nebulizer daily  aspirin  chewable 81 milliGRAM(s) Oral daily  atorvastatin 80 milliGRAM(s) Oral at bedtime  budesonide  80 MICROgram(s)/formoterol 4.5 MICROgram(s) Inhaler 2 Puff(s) Inhalation two times a day  chlorhexidine 2% Cloths 1 Application(s) Topical daily  dexMEDEtomidine Infusion 0.5 MICROgram(s)/kG/Hr (12.4 mL/Hr) IV Continuous <Continuous>  dextrose 50% Injectable 25 Gram(s) IV Push once  dextrose 50% Injectable 12.5 Gram(s) IV Push once  dextrose 50% Injectable 25 Gram(s) IV Push once  glucagon  Injectable 1 milliGRAM(s) IntraMuscular once  heparin  Infusion 950 Unit(s)/Hr (13 mL/Hr) IV Continuous <Continuous>  hydrALAZINE 30 milliGRAM(s) Oral every 8 hours  insulin glargine Injectable (LANTUS) 8 Unit(s) SubCutaneous at bedtime  insulin lispro (ADMELOG) corrective regimen sliding scale   SubCutaneous three times a day before meals  insulin lispro (ADMELOG) corrective regimen sliding scale   SubCutaneous at bedtime  isosorbide   dinitrate Tablet (ISORDIL) 20 milliGRAM(s) Oral three times a day  LORazepam     Tablet 0.5 milliGRAM(s) Oral three times a day  nitroprusside Infusion 0.5 MICROgram(s)/kG/Min (7.42 mL/Hr) IV Continuous <Continuous>  pantoprazole    Tablet 40 milliGRAM(s) Oral before breakfast  polyethylene glycol 3350 17 Gram(s) Oral daily  spironolactone 25 milliGRAM(s) Oral daily  ticagrelor 90 milliGRAM(s) Oral every 12 hours  tiotropium 2.5 MICROgram(s) Inhaler 2 Puff(s) Inhalation daily    MEDICATIONS  (PRN):  dextrose Oral Gel 15 Gram(s) Oral once PRN Blood Glucose LESS THAN 70 milliGRAM(s)/deciliter      ALLERGIES:  penicillins (Unknown)      LABS:                                            12.5   10.01 )-----------( 126      ( 05 Nov 2023 00:31 )             36.6   11-05    133<L>  |  101  |  11  ----------------------------<  184<H>  4.1   |  18<L>  |  1.19    Ca    8.5      05 Nov 2023 00:31  Phos  3.0     11-05  Mg     2.0     11-05    TPro  5.9<L>  /  Alb  3.0<L>  /  TBili  0.5  /  DBili  x   /  AST  46<H>  /  ALT  33  /  AlkPhos  46  11-05       < from: Xray Chest 1 View- PORTABLE-Urgent (11.01.23 @ 07:42) >    IMPRESSION:  Clear lungs.    ---End of Report ---        < end of copied text >  < from: TTE W or WO Ultrasound Enhancing Agent (11.01.23 @ 10:23) >  _____________________________     CONCLUSIONS:      1. Left ventricular cavity is moderately dilated. Left ventricular wall thickness is normal. Left ventricular systolic function is severely decreased with an ejection fraction of 32 % by Chinchilla's method of disks. Regional wall motion abnormalities present.   2. Multiple segmental abnormalities exist. See findings.   3. There is moderate (grade 2) left ventricular diastolic dysfunction, with indeterminant filling pressure.   4. Normal right ventricular cavity size, wall thickness, and systolic function.   5. No significant valvular disease.   6. No pericardial effusion seen.   7. Compared to the transthoracic echocardiogram performed on 1/25/2017 the areas of akinesis are unchanged but there has been a decline in LV systolic function with new areas of hypokinesis.    __________________________________________________________________    < end of copied text >  < from: TTE Limited W or WO Ultrasound Enhancing Agent (11.02.23 @ 07:41) >  __________________________     CONCLUSIONS:      1. After obtaining consent, Definity ultrasound enhancing agent was given for enhanced left ventricular opacification and improved delineation of the left ventricular endocardial borders. Left ventricular systolic function is severely decreased with a calculated ejection fraction of 22 % by the Chinchilla's biplane method of disks. There is a left ventricular thrombus.   2. Findings were discussed with Litzy BOSS on 11/2/2023 at 8.49am.   3. There is a left ventricular thrombus.    _________________________________________________________________    < end of copied text >

## 2023-11-05 NOTE — PROGRESS NOTE ADULT - PROBLEM SELECTOR PLAN 5
- Persistent fevers in the setting COVID infection with high SvO2s   - On empiric antibiotics   - F/u BCx data

## 2023-11-05 NOTE — PROGRESS NOTE ADULT - ASSESSMENT
57 yo male h/o htn, cad s/p pci, ICH, here with NSTEMI  s/p intubation and cath. now in CCU    NSTEMI  s/p  cath  cath results noted. multi vessel dz., CTSx f/u. will need viability study  hep gtt  IABP    resp failure  s/p intubation  now extubated     LV thrombus  on hep gtt    acute on chronic systolic heart failure  heart failure team f/u  IABP  diuresis  plan to wean off IABP as able    pna  recently diagnosed outpt  iv abx  f/u cult   id following     covid  supportive care    h/o ICH  resolved    mngt as per CCU team  d/w CCU attn    d/w pts pmd        Advanced care planning was discussed with patient and family.  Advanced care planning forms were reviewed and discussed as appropriate.  Differential diagnosis and plan of care discussed with patient after the evaluation.   Pain assessed and judicious use of narcotics when appropriate was discussed.  Importance of Fall prevention discussed.  Counseling on Smoking and Alcohol cessation was offered when appropriate.  Counseling on Diet, exercise, and medication compliance was done.       Approx 75 minutes spent.

## 2023-11-06 DIAGNOSIS — R50.9 FEVER, UNSPECIFIED: ICD-10-CM

## 2023-11-06 LAB
ALBUMIN SERPL ELPH-MCNC: 2.9 G/DL — LOW (ref 3.3–5)
ALBUMIN SERPL ELPH-MCNC: 2.9 G/DL — LOW (ref 3.3–5)
ALBUMIN SERPL ELPH-MCNC: 3 G/DL — LOW (ref 3.3–5)
ALBUMIN SERPL ELPH-MCNC: 3 G/DL — LOW (ref 3.3–5)
ALP SERPL-CCNC: 40 U/L — SIGNIFICANT CHANGE UP (ref 40–120)
ALP SERPL-CCNC: 40 U/L — SIGNIFICANT CHANGE UP (ref 40–120)
ALP SERPL-CCNC: 41 U/L — SIGNIFICANT CHANGE UP (ref 40–120)
ALP SERPL-CCNC: 41 U/L — SIGNIFICANT CHANGE UP (ref 40–120)
ALT FLD-CCNC: 53 U/L — HIGH (ref 10–45)
ALT FLD-CCNC: 53 U/L — HIGH (ref 10–45)
ALT FLD-CCNC: 55 U/L — HIGH (ref 10–45)
ALT FLD-CCNC: 55 U/L — HIGH (ref 10–45)
ANION GAP SERPL CALC-SCNC: 14 MMOL/L — SIGNIFICANT CHANGE UP (ref 5–17)
APTT BLD: 55.4 SEC — HIGH (ref 24.5–35.6)
APTT BLD: 55.4 SEC — HIGH (ref 24.5–35.6)
APTT BLD: 56.5 SEC — HIGH (ref 24.5–35.6)
APTT BLD: 56.5 SEC — HIGH (ref 24.5–35.6)
APTT BLD: 63.6 SEC — HIGH (ref 24.5–35.6)
APTT BLD: 63.6 SEC — HIGH (ref 24.5–35.6)
AST SERPL-CCNC: 65 U/L — HIGH (ref 10–40)
AST SERPL-CCNC: 65 U/L — HIGH (ref 10–40)
AST SERPL-CCNC: 73 U/L — HIGH (ref 10–40)
AST SERPL-CCNC: 73 U/L — HIGH (ref 10–40)
BASE EXCESS BLDMV CALC-SCNC: -0.1 MMOL/L — SIGNIFICANT CHANGE UP (ref -3–3)
BASE EXCESS BLDMV CALC-SCNC: -0.1 MMOL/L — SIGNIFICANT CHANGE UP (ref -3–3)
BASE EXCESS BLDMV CALC-SCNC: -0.3 MMOL/L — SIGNIFICANT CHANGE UP (ref -3–3)
BASE EXCESS BLDMV CALC-SCNC: -0.3 MMOL/L — SIGNIFICANT CHANGE UP (ref -3–3)
BASE EXCESS BLDMV CALC-SCNC: -1.1 MMOL/L — SIGNIFICANT CHANGE UP (ref -3–3)
BASE EXCESS BLDMV CALC-SCNC: -1.1 MMOL/L — SIGNIFICANT CHANGE UP (ref -3–3)
BASE EXCESS BLDMV CALC-SCNC: -1.7 MMOL/L — SIGNIFICANT CHANGE UP (ref -3–3)
BASE EXCESS BLDMV CALC-SCNC: -1.7 MMOL/L — SIGNIFICANT CHANGE UP (ref -3–3)
BASE EXCESS BLDMV CALC-SCNC: 0.6 MMOL/L — SIGNIFICANT CHANGE UP (ref -3–3)
BASE EXCESS BLDMV CALC-SCNC: 0.6 MMOL/L — SIGNIFICANT CHANGE UP (ref -3–3)
BASOPHILS # BLD AUTO: 0.03 K/UL — SIGNIFICANT CHANGE UP (ref 0–0.2)
BASOPHILS # BLD AUTO: 0.03 K/UL — SIGNIFICANT CHANGE UP (ref 0–0.2)
BASOPHILS NFR BLD AUTO: 0.3 % — SIGNIFICANT CHANGE UP (ref 0–2)
BASOPHILS NFR BLD AUTO: 0.3 % — SIGNIFICANT CHANGE UP (ref 0–2)
BILIRUB SERPL-MCNC: 0.7 MG/DL — SIGNIFICANT CHANGE UP (ref 0.2–1.2)
BILIRUB SERPL-MCNC: 0.7 MG/DL — SIGNIFICANT CHANGE UP (ref 0.2–1.2)
BILIRUB SERPL-MCNC: 0.8 MG/DL — SIGNIFICANT CHANGE UP (ref 0.2–1.2)
BILIRUB SERPL-MCNC: 0.8 MG/DL — SIGNIFICANT CHANGE UP (ref 0.2–1.2)
BLD GP AB SCN SERPL QL: NEGATIVE — SIGNIFICANT CHANGE UP
BLD GP AB SCN SERPL QL: NEGATIVE — SIGNIFICANT CHANGE UP
BUN SERPL-MCNC: 19 MG/DL — SIGNIFICANT CHANGE UP (ref 7–23)
BUN SERPL-MCNC: 19 MG/DL — SIGNIFICANT CHANGE UP (ref 7–23)
BUN SERPL-MCNC: 20 MG/DL — SIGNIFICANT CHANGE UP (ref 7–23)
BUN SERPL-MCNC: 20 MG/DL — SIGNIFICANT CHANGE UP (ref 7–23)
BUN SERPL-MCNC: 22 MG/DL — SIGNIFICANT CHANGE UP (ref 7–23)
BUN SERPL-MCNC: 22 MG/DL — SIGNIFICANT CHANGE UP (ref 7–23)
CALCIUM SERPL-MCNC: 8.7 MG/DL — SIGNIFICANT CHANGE UP (ref 8.4–10.5)
CALCIUM SERPL-MCNC: 8.7 MG/DL — SIGNIFICANT CHANGE UP (ref 8.4–10.5)
CALCIUM SERPL-MCNC: 8.8 MG/DL — SIGNIFICANT CHANGE UP (ref 8.4–10.5)
CALCIUM SERPL-MCNC: 8.8 MG/DL — SIGNIFICANT CHANGE UP (ref 8.4–10.5)
CALCIUM SERPL-MCNC: 8.9 MG/DL — SIGNIFICANT CHANGE UP (ref 8.4–10.5)
CALCIUM SERPL-MCNC: 8.9 MG/DL — SIGNIFICANT CHANGE UP (ref 8.4–10.5)
CHLORIDE SERPL-SCNC: 100 MMOL/L — SIGNIFICANT CHANGE UP (ref 96–108)
CHLORIDE SERPL-SCNC: 100 MMOL/L — SIGNIFICANT CHANGE UP (ref 96–108)
CHLORIDE SERPL-SCNC: 97 MMOL/L — SIGNIFICANT CHANGE UP (ref 96–108)
CHLORIDE SERPL-SCNC: 97 MMOL/L — SIGNIFICANT CHANGE UP (ref 96–108)
CHLORIDE SERPL-SCNC: 99 MMOL/L — SIGNIFICANT CHANGE UP (ref 96–108)
CHLORIDE SERPL-SCNC: 99 MMOL/L — SIGNIFICANT CHANGE UP (ref 96–108)
CO2 BLDMV-SCNC: 24 MMOL/L — SIGNIFICANT CHANGE UP (ref 21–29)
CO2 BLDMV-SCNC: 24 MMOL/L — SIGNIFICANT CHANGE UP (ref 21–29)
CO2 BLDMV-SCNC: 25 MMOL/L — SIGNIFICANT CHANGE UP (ref 21–29)
CO2 BLDMV-SCNC: 26 MMOL/L — SIGNIFICANT CHANGE UP (ref 21–29)
CO2 BLDMV-SCNC: 26 MMOL/L — SIGNIFICANT CHANGE UP (ref 21–29)
CO2 SERPL-SCNC: 17 MMOL/L — LOW (ref 22–31)
CO2 SERPL-SCNC: 17 MMOL/L — LOW (ref 22–31)
CO2 SERPL-SCNC: 18 MMOL/L — LOW (ref 22–31)
CREAT SERPL-MCNC: 1.46 MG/DL — HIGH (ref 0.5–1.3)
CREAT SERPL-MCNC: 1.46 MG/DL — HIGH (ref 0.5–1.3)
CREAT SERPL-MCNC: 1.49 MG/DL — HIGH (ref 0.5–1.3)
CREAT SERPL-MCNC: 1.49 MG/DL — HIGH (ref 0.5–1.3)
CREAT SERPL-MCNC: 1.62 MG/DL — HIGH (ref 0.5–1.3)
CREAT SERPL-MCNC: 1.62 MG/DL — HIGH (ref 0.5–1.3)
EGFR: 49 ML/MIN/1.73M2 — LOW
EGFR: 49 ML/MIN/1.73M2 — LOW
EGFR: 54 ML/MIN/1.73M2 — LOW
EGFR: 54 ML/MIN/1.73M2 — LOW
EGFR: 55 ML/MIN/1.73M2 — LOW
EGFR: 55 ML/MIN/1.73M2 — LOW
EOSINOPHIL # BLD AUTO: 0.22 K/UL — SIGNIFICANT CHANGE UP (ref 0–0.5)
EOSINOPHIL # BLD AUTO: 0.22 K/UL — SIGNIFICANT CHANGE UP (ref 0–0.5)
EOSINOPHIL NFR BLD AUTO: 2 % — SIGNIFICANT CHANGE UP (ref 0–6)
EOSINOPHIL NFR BLD AUTO: 2 % — SIGNIFICANT CHANGE UP (ref 0–6)
GAS PNL BLDA: SIGNIFICANT CHANGE UP
GAS PNL BLDMV: SIGNIFICANT CHANGE UP
GLUCOSE BLDC GLUCOMTR-MCNC: 145 MG/DL — HIGH (ref 70–99)
GLUCOSE BLDC GLUCOMTR-MCNC: 145 MG/DL — HIGH (ref 70–99)
GLUCOSE BLDC GLUCOMTR-MCNC: 173 MG/DL — HIGH (ref 70–99)
GLUCOSE BLDC GLUCOMTR-MCNC: 173 MG/DL — HIGH (ref 70–99)
GLUCOSE BLDC GLUCOMTR-MCNC: 183 MG/DL — HIGH (ref 70–99)
GLUCOSE BLDC GLUCOMTR-MCNC: 183 MG/DL — HIGH (ref 70–99)
GLUCOSE BLDC GLUCOMTR-MCNC: 194 MG/DL — HIGH (ref 70–99)
GLUCOSE BLDC GLUCOMTR-MCNC: 194 MG/DL — HIGH (ref 70–99)
GLUCOSE SERPL-MCNC: 150 MG/DL — HIGH (ref 70–99)
GLUCOSE SERPL-MCNC: 150 MG/DL — HIGH (ref 70–99)
GLUCOSE SERPL-MCNC: 161 MG/DL — HIGH (ref 70–99)
GLUCOSE SERPL-MCNC: 161 MG/DL — HIGH (ref 70–99)
GLUCOSE SERPL-MCNC: 186 MG/DL — HIGH (ref 70–99)
GLUCOSE SERPL-MCNC: 186 MG/DL — HIGH (ref 70–99)
HCO3 BLDMV-SCNC: 23 MMOL/L — SIGNIFICANT CHANGE UP (ref 20–28)
HCO3 BLDMV-SCNC: 23 MMOL/L — SIGNIFICANT CHANGE UP (ref 20–28)
HCO3 BLDMV-SCNC: 24 MMOL/L — SIGNIFICANT CHANGE UP (ref 20–28)
HCT VFR BLD CALC: 35.4 % — LOW (ref 39–50)
HCT VFR BLD CALC: 35.4 % — LOW (ref 39–50)
HGB BLD-MCNC: 12 G/DL — LOW (ref 13–17)
HGB BLD-MCNC: 12 G/DL — LOW (ref 13–17)
HOROWITZ INDEX BLDMV+IHG-RTO: 28 — SIGNIFICANT CHANGE UP
HOROWITZ INDEX BLDMV+IHG-RTO: 28 — SIGNIFICANT CHANGE UP
HOROWITZ INDEX BLDMV+IHG-RTO: 32 — SIGNIFICANT CHANGE UP
HOROWITZ INDEX BLDMV+IHG-RTO: 32 — SIGNIFICANT CHANGE UP
HOROWITZ INDEX BLDMV+IHG-RTO: 36 — SIGNIFICANT CHANGE UP
IMM GRANULOCYTES NFR BLD AUTO: 0.5 % — SIGNIFICANT CHANGE UP (ref 0–0.9)
IMM GRANULOCYTES NFR BLD AUTO: 0.5 % — SIGNIFICANT CHANGE UP (ref 0–0.9)
LACTATE BLDV-MCNC: 0.8 MMOL/L — SIGNIFICANT CHANGE UP (ref 0.5–2)
LACTATE BLDV-MCNC: 0.8 MMOL/L — SIGNIFICANT CHANGE UP (ref 0.5–2)
LACTATE BLDV-MCNC: 0.9 MMOL/L — SIGNIFICANT CHANGE UP (ref 0.5–2)
LACTATE BLDV-MCNC: 0.9 MMOL/L — SIGNIFICANT CHANGE UP (ref 0.5–2)
LYMPHOCYTES # BLD AUTO: 1.74 K/UL — SIGNIFICANT CHANGE UP (ref 1–3.3)
LYMPHOCYTES # BLD AUTO: 1.74 K/UL — SIGNIFICANT CHANGE UP (ref 1–3.3)
LYMPHOCYTES # BLD AUTO: 15.8 % — SIGNIFICANT CHANGE UP (ref 13–44)
LYMPHOCYTES # BLD AUTO: 15.8 % — SIGNIFICANT CHANGE UP (ref 13–44)
MAGNESIUM SERPL-MCNC: 2 MG/DL — SIGNIFICANT CHANGE UP (ref 1.6–2.6)
MAGNESIUM SERPL-MCNC: 2.2 MG/DL — SIGNIFICANT CHANGE UP (ref 1.6–2.6)
MAGNESIUM SERPL-MCNC: 2.2 MG/DL — SIGNIFICANT CHANGE UP (ref 1.6–2.6)
MCHC RBC-ENTMCNC: 28 PG — SIGNIFICANT CHANGE UP (ref 27–34)
MCHC RBC-ENTMCNC: 28 PG — SIGNIFICANT CHANGE UP (ref 27–34)
MCHC RBC-ENTMCNC: 33.9 GM/DL — SIGNIFICANT CHANGE UP (ref 32–36)
MCHC RBC-ENTMCNC: 33.9 GM/DL — SIGNIFICANT CHANGE UP (ref 32–36)
MCV RBC AUTO: 82.7 FL — SIGNIFICANT CHANGE UP (ref 80–100)
MCV RBC AUTO: 82.7 FL — SIGNIFICANT CHANGE UP (ref 80–100)
MONOCYTES # BLD AUTO: 1.01 K/UL — HIGH (ref 0–0.9)
MONOCYTES # BLD AUTO: 1.01 K/UL — HIGH (ref 0–0.9)
MONOCYTES NFR BLD AUTO: 9.2 % — SIGNIFICANT CHANGE UP (ref 2–14)
MONOCYTES NFR BLD AUTO: 9.2 % — SIGNIFICANT CHANGE UP (ref 2–14)
NEUTROPHILS # BLD AUTO: 7.95 K/UL — HIGH (ref 1.8–7.4)
NEUTROPHILS # BLD AUTO: 7.95 K/UL — HIGH (ref 1.8–7.4)
NEUTROPHILS NFR BLD AUTO: 72.2 % — SIGNIFICANT CHANGE UP (ref 43–77)
NEUTROPHILS NFR BLD AUTO: 72.2 % — SIGNIFICANT CHANGE UP (ref 43–77)
NRBC # BLD: 0 /100 WBCS — SIGNIFICANT CHANGE UP (ref 0–0)
NRBC # BLD: 0 /100 WBCS — SIGNIFICANT CHANGE UP (ref 0–0)
O2 CT VFR BLD CALC: 34 MMHG — SIGNIFICANT CHANGE UP (ref 30–65)
O2 CT VFR BLD CALC: 34 MMHG — SIGNIFICANT CHANGE UP (ref 30–65)
O2 CT VFR BLD CALC: 42 MMHG — SIGNIFICANT CHANGE UP (ref 30–65)
O2 CT VFR BLD CALC: 42 MMHG — SIGNIFICANT CHANGE UP (ref 30–65)
O2 CT VFR BLD CALC: 45 MMHG — SIGNIFICANT CHANGE UP (ref 30–65)
O2 CT VFR BLD CALC: 45 MMHG — SIGNIFICANT CHANGE UP (ref 30–65)
O2 CT VFR BLD CALC: 46 MMHG — SIGNIFICANT CHANGE UP (ref 30–65)
PCO2 BLDMV: 36 MMHG — SIGNIFICANT CHANGE UP (ref 30–65)
PCO2 BLDMV: 38 MMHG — SIGNIFICANT CHANGE UP (ref 30–65)
PH BLDMV: 7.39 — SIGNIFICANT CHANGE UP (ref 7.32–7.45)
PH BLDMV: 7.39 — SIGNIFICANT CHANGE UP (ref 7.32–7.45)
PH BLDMV: 7.4 — SIGNIFICANT CHANGE UP (ref 7.32–7.45)
PH BLDMV: 7.4 — SIGNIFICANT CHANGE UP (ref 7.32–7.45)
PH BLDMV: 7.41 — SIGNIFICANT CHANGE UP (ref 7.32–7.45)
PH BLDMV: 7.41 — SIGNIFICANT CHANGE UP (ref 7.32–7.45)
PH BLDMV: 7.43 — SIGNIFICANT CHANGE UP (ref 7.32–7.45)
PH BLDMV: 7.43 — SIGNIFICANT CHANGE UP (ref 7.32–7.45)
PH BLDMV: 7.44 — SIGNIFICANT CHANGE UP (ref 7.32–7.45)
PH BLDMV: 7.44 — SIGNIFICANT CHANGE UP (ref 7.32–7.45)
PHOSPHATE SERPL-MCNC: 2.4 MG/DL — LOW (ref 2.5–4.5)
PHOSPHATE SERPL-MCNC: 2.4 MG/DL — LOW (ref 2.5–4.5)
PHOSPHATE SERPL-MCNC: 2.6 MG/DL — SIGNIFICANT CHANGE UP (ref 2.5–4.5)
PHOSPHATE SERPL-MCNC: 2.6 MG/DL — SIGNIFICANT CHANGE UP (ref 2.5–4.5)
PHOSPHATE SERPL-MCNC: 3.2 MG/DL — SIGNIFICANT CHANGE UP (ref 2.5–4.5)
PHOSPHATE SERPL-MCNC: 3.2 MG/DL — SIGNIFICANT CHANGE UP (ref 2.5–4.5)
PLATELET # BLD AUTO: 130 K/UL — LOW (ref 150–400)
PLATELET # BLD AUTO: 130 K/UL — LOW (ref 150–400)
POTASSIUM SERPL-MCNC: 3.7 MMOL/L — SIGNIFICANT CHANGE UP (ref 3.5–5.3)
POTASSIUM SERPL-MCNC: 3.7 MMOL/L — SIGNIFICANT CHANGE UP (ref 3.5–5.3)
POTASSIUM SERPL-MCNC: 3.8 MMOL/L — SIGNIFICANT CHANGE UP (ref 3.5–5.3)
POTASSIUM SERPL-MCNC: 3.8 MMOL/L — SIGNIFICANT CHANGE UP (ref 3.5–5.3)
POTASSIUM SERPL-MCNC: 4 MMOL/L — SIGNIFICANT CHANGE UP (ref 3.5–5.3)
POTASSIUM SERPL-MCNC: 4 MMOL/L — SIGNIFICANT CHANGE UP (ref 3.5–5.3)
POTASSIUM SERPL-SCNC: 3.7 MMOL/L — SIGNIFICANT CHANGE UP (ref 3.5–5.3)
POTASSIUM SERPL-SCNC: 3.7 MMOL/L — SIGNIFICANT CHANGE UP (ref 3.5–5.3)
POTASSIUM SERPL-SCNC: 3.8 MMOL/L — SIGNIFICANT CHANGE UP (ref 3.5–5.3)
POTASSIUM SERPL-SCNC: 3.8 MMOL/L — SIGNIFICANT CHANGE UP (ref 3.5–5.3)
POTASSIUM SERPL-SCNC: 4 MMOL/L — SIGNIFICANT CHANGE UP (ref 3.5–5.3)
POTASSIUM SERPL-SCNC: 4 MMOL/L — SIGNIFICANT CHANGE UP (ref 3.5–5.3)
PROT SERPL-MCNC: 6.1 G/DL — SIGNIFICANT CHANGE UP (ref 6–8.3)
PROT SERPL-MCNC: 6.1 G/DL — SIGNIFICANT CHANGE UP (ref 6–8.3)
PROT SERPL-MCNC: 6.2 G/DL — SIGNIFICANT CHANGE UP (ref 6–8.3)
PROT SERPL-MCNC: 6.2 G/DL — SIGNIFICANT CHANGE UP (ref 6–8.3)
RBC # BLD: 4.28 M/UL — SIGNIFICANT CHANGE UP (ref 4.2–5.8)
RBC # BLD: 4.28 M/UL — SIGNIFICANT CHANGE UP (ref 4.2–5.8)
RBC # FLD: 14.6 % — HIGH (ref 10.3–14.5)
RBC # FLD: 14.6 % — HIGH (ref 10.3–14.5)
RH IG SCN BLD-IMP: POSITIVE — SIGNIFICANT CHANGE UP
RH IG SCN BLD-IMP: POSITIVE — SIGNIFICANT CHANGE UP
SAO2 % BLDMV: 61.6 — SIGNIFICANT CHANGE UP (ref 60–90)
SAO2 % BLDMV: 61.6 — SIGNIFICANT CHANGE UP (ref 60–90)
SAO2 % BLDMV: 67.5 — SIGNIFICANT CHANGE UP (ref 60–90)
SAO2 % BLDMV: 67.5 — SIGNIFICANT CHANGE UP (ref 60–90)
SAO2 % BLDMV: 73 — SIGNIFICANT CHANGE UP (ref 60–90)
SAO2 % BLDMV: 73 — SIGNIFICANT CHANGE UP (ref 60–90)
SAO2 % BLDMV: 73.6 — SIGNIFICANT CHANGE UP (ref 60–90)
SAO2 % BLDMV: 73.6 — SIGNIFICANT CHANGE UP (ref 60–90)
SAO2 % BLDMV: 74.2 — SIGNIFICANT CHANGE UP (ref 60–90)
SAO2 % BLDMV: 74.2 — SIGNIFICANT CHANGE UP (ref 60–90)
SODIUM SERPL-SCNC: 129 MMOL/L — LOW (ref 135–145)
SODIUM SERPL-SCNC: 129 MMOL/L — LOW (ref 135–145)
SODIUM SERPL-SCNC: 131 MMOL/L — LOW (ref 135–145)
WBC # BLD: 11.01 K/UL — HIGH (ref 3.8–10.5)
WBC # BLD: 11.01 K/UL — HIGH (ref 3.8–10.5)
WBC # FLD AUTO: 11.01 K/UL — HIGH (ref 3.8–10.5)
WBC # FLD AUTO: 11.01 K/UL — HIGH (ref 3.8–10.5)

## 2023-11-06 PROCEDURE — 71045 X-RAY EXAM CHEST 1 VIEW: CPT | Mod: 26

## 2023-11-06 PROCEDURE — 99233 SBSQ HOSP IP/OBS HIGH 50: CPT

## 2023-11-06 PROCEDURE — 93010 ELECTROCARDIOGRAM REPORT: CPT

## 2023-11-06 PROCEDURE — 99292 CRITICAL CARE ADDL 30 MIN: CPT

## 2023-11-06 PROCEDURE — 99291 CRITICAL CARE FIRST HOUR: CPT | Mod: 25

## 2023-11-06 RX ORDER — NYSTATIN 500MM UNIT
500000 POWDER (EA) MISCELLANEOUS EVERY 6 HOURS
Refills: 0 | Status: DISCONTINUED | OUTPATIENT
Start: 2023-11-06 | End: 2023-11-09

## 2023-11-06 RX ORDER — SODIUM CHLORIDE 0.65 %
1 AEROSOL, SPRAY (ML) NASAL THREE TIMES A DAY
Refills: 0 | Status: DISCONTINUED | OUTPATIENT
Start: 2023-11-06 | End: 2023-11-09

## 2023-11-06 RX ORDER — HYDRALAZINE HCL 50 MG
40 TABLET ORAL EVERY 8 HOURS
Refills: 0 | Status: DISCONTINUED | OUTPATIENT
Start: 2023-11-06 | End: 2023-11-07

## 2023-11-06 RX ORDER — ISOSORBIDE DINITRATE 5 MG/1
30 TABLET ORAL THREE TIMES A DAY
Refills: 0 | Status: DISCONTINUED | OUTPATIENT
Start: 2023-11-06 | End: 2023-11-09

## 2023-11-06 RX ORDER — SODIUM,POTASSIUM PHOSPHATES 278-250MG
1 POWDER IN PACKET (EA) ORAL ONCE
Refills: 0 | Status: COMPLETED | OUTPATIENT
Start: 2023-11-06 | End: 2023-11-06

## 2023-11-06 RX ORDER — SODIUM CHLORIDE 9 MG/ML
500 INJECTION INTRAMUSCULAR; INTRAVENOUS; SUBCUTANEOUS ONCE
Refills: 0 | Status: COMPLETED | OUTPATIENT
Start: 2023-11-06 | End: 2023-11-06

## 2023-11-06 RX ORDER — FUROSEMIDE 40 MG
80 TABLET ORAL DAILY
Refills: 0 | Status: DISCONTINUED | OUTPATIENT
Start: 2023-11-06 | End: 2023-11-10

## 2023-11-06 RX ADMIN — Medication 1 SPRAY(S): at 13:30

## 2023-11-06 RX ADMIN — SODIUM NITROPRUSSIDE 7.42 MICROGRAM(S)/KG/MIN: 50 INJECTION INTRAVENOUS at 18:18

## 2023-11-06 RX ADMIN — HEPARIN SODIUM 14.5 UNIT(S)/HR: 5000 INJECTION INTRAVENOUS; SUBCUTANEOUS at 03:45

## 2023-11-06 RX ADMIN — DEXMEDETOMIDINE HYDROCHLORIDE IN 0.9% SODIUM CHLORIDE 12.4 MICROGRAM(S)/KG/HR: 4 INJECTION INTRAVENOUS at 22:18

## 2023-11-06 RX ADMIN — Medication 40 MILLIGRAM(S): at 22:19

## 2023-11-06 RX ADMIN — Medication 1 SPRAY(S): at 07:10

## 2023-11-06 RX ADMIN — Medication 500000 UNIT(S): at 07:10

## 2023-11-06 RX ADMIN — Medication 1 PACKET(S): at 12:55

## 2023-11-06 RX ADMIN — TICAGRELOR 90 MILLIGRAM(S): 90 TABLET ORAL at 06:42

## 2023-11-06 RX ADMIN — TICAGRELOR 90 MILLIGRAM(S): 90 TABLET ORAL at 17:12

## 2023-11-06 RX ADMIN — Medication 0.5 MILLIGRAM(S): at 11:14

## 2023-11-06 RX ADMIN — HEPARIN SODIUM 16 UNIT(S)/HR: 5000 INJECTION INTRAVENOUS; SUBCUTANEOUS at 22:18

## 2023-11-06 RX ADMIN — ATORVASTATIN CALCIUM 80 MILLIGRAM(S): 80 TABLET, FILM COATED ORAL at 22:19

## 2023-11-06 RX ADMIN — BUDESONIDE AND FORMOTEROL FUMARATE DIHYDRATE 2 PUFF(S): 160; 4.5 AEROSOL RESPIRATORY (INHALATION) at 20:28

## 2023-11-06 RX ADMIN — Medication 80 MILLIGRAM(S): at 11:25

## 2023-11-06 RX ADMIN — ISOSORBIDE DINITRATE 30 MILLIGRAM(S): 5 TABLET ORAL at 06:43

## 2023-11-06 RX ADMIN — Medication 40 MILLIGRAM(S): at 13:30

## 2023-11-06 RX ADMIN — Medication 81 MILLIGRAM(S): at 11:13

## 2023-11-06 RX ADMIN — Medication 500000 UNIT(S): at 13:31

## 2023-11-06 RX ADMIN — Medication 2: at 12:38

## 2023-11-06 RX ADMIN — Medication 40 MILLIGRAM(S): at 06:44

## 2023-11-06 RX ADMIN — Medication 0.5 MILLIGRAM(S): at 17:12

## 2023-11-06 RX ADMIN — CHLORHEXIDINE GLUCONATE 1 APPLICATION(S): 213 SOLUTION TOPICAL at 22:18

## 2023-11-06 RX ADMIN — SODIUM NITROPRUSSIDE 7.42 MICROGRAM(S)/KG/MIN: 50 INJECTION INTRAVENOUS at 22:19

## 2023-11-06 RX ADMIN — SPIRONOLACTONE 25 MILLIGRAM(S): 25 TABLET, FILM COATED ORAL at 06:43

## 2023-11-06 RX ADMIN — Medication 500000 UNIT(S): at 17:13

## 2023-11-06 RX ADMIN — HEPARIN SODIUM 16 UNIT(S)/HR: 5000 INJECTION INTRAVENOUS; SUBCUTANEOUS at 12:40

## 2023-11-06 RX ADMIN — ISOSORBIDE DINITRATE 30 MILLIGRAM(S): 5 TABLET ORAL at 16:00

## 2023-11-06 RX ADMIN — Medication 0.5 MILLIGRAM(S): at 06:43

## 2023-11-06 RX ADMIN — Medication 1 SPRAY(S): at 22:19

## 2023-11-06 RX ADMIN — Medication 2: at 08:48

## 2023-11-06 RX ADMIN — SODIUM CHLORIDE 500 MILLILITER(S): 9 INJECTION INTRAMUSCULAR; INTRAVENOUS; SUBCUTANEOUS at 03:10

## 2023-11-06 RX ADMIN — DEXMEDETOMIDINE HYDROCHLORIDE IN 0.9% SODIUM CHLORIDE 12.4 MICROGRAM(S)/KG/HR: 4 INJECTION INTRAVENOUS at 18:07

## 2023-11-06 RX ADMIN — PANTOPRAZOLE SODIUM 40 MILLIGRAM(S): 20 TABLET, DELAYED RELEASE ORAL at 06:43

## 2023-11-06 RX ADMIN — INSULIN GLARGINE 8 UNIT(S): 100 INJECTION, SOLUTION SUBCUTANEOUS at 22:18

## 2023-11-06 RX ADMIN — Medication 2: at 16:01

## 2023-11-06 RX ADMIN — ISOSORBIDE DINITRATE 30 MILLIGRAM(S): 5 TABLET ORAL at 11:13

## 2023-11-06 NOTE — PROGRESS NOTE ADULT - PROBLEM SELECTOR PLAN 2
- pLAD 70 % stenosis in the ostium portion of the segment and 100 % in-stent restenosis. mRCA, 100 % stenosis.   - Currently ASA, Atorvastatin 80mg and Brilinta  - Pending viability study, but poor coronary perfusion even when LAD was patent.

## 2023-11-06 NOTE — PROGRESS NOTE ADULT - PROBLEM SELECTOR PLAN 1
- Currently on IABP 1:1. Re-attempt wean when euvolemic.   - Inotrope: N/A  - Wean IV nitroprusside 0.5mcg/kg/min, in favor of Hydral/Isordil  - Aldactone 25mg daily  - Start Lasix 80mg IV.  CVP goal <10. CVP today 13.   - Trend perfusion markers daily, MVO2, BMP, Lactate and LFT's  - Strict I/O's.  - Patient failed IABP may need AT eval

## 2023-11-06 NOTE — PROGRESS NOTE ADULT - CRITICAL CARE ATTENDING COMMENT
History of CAD s/p PCI  Admitted with cardiogenic shock and respiratory failure in the setting of stent restenosis  A+Ox3, requiring Ativan and Precedex due to anxiety - wean Precedex as able  Cardiogenic shock requiring IABP with Nipride and Bidil for afterload reduction  SvO2 70s  Wean IABP   Continue DAPT with ASA/Ticagrelor  O2 sats mid to high 90s on nasal cannula - wean to room air  DASH/diabetic diet  Non-oliguric ARIC, CVP is elevated - continue diuresis with IV Lasix to target 1-2L overall negative   H/H low but acceptable on Heparin drip for IABP  Afebrile, no antibiotics, +COVID - defer Remdesivir per ID   Sugars controlled on Lantus  IABP and Armando and rangel 11/1

## 2023-11-06 NOTE — PROGRESS NOTE ADULT - SUBJECTIVE AND OBJECTIVE BOX
LD VALENCIA  MRN-[id number]  Patient is a 58y old  Male who presents with a chief complaint of CP and SOB (06 Nov 2023 12:47)    HPI:  pt seen and approx 1:30 pm  in [facility]    57yo M w/ hx HTN, CAD w/ 1 stent in 2009, ICH (2008) presenting with abn ekg. Patient presented to [facility] where he was found to have STEMI, recommended to get cath however patient did not want to get it there so it left and came here.  Patient initially had cough, congestion, fever, was placed on antibiotics on Sunday.  Started feeling nauseous and had a presyncopal event after which he presented to ED last night.  Had chest pain as well.  Chest pain is midsternal.  Not currently having chest pain.  Received 4 aspirin 30 min pta. (01 Nov 2023 15:11)    24 Hours: undergoing IABP wean for removal in AM    REVIEW OF SYSTEMS:    CONSTITUTIONAL: No weakness, fevers or chills  EYES/ENT: No visual changes;  No vertigo or throat pain   NECK: No pain or stiffness  RESPIRATORY: No cough, wheezing, hemoptysis; No shortness of breath  CARDIOVASCULAR: No chest pain or palpitations  GASTROINTESTINAL: No abdominal or epigastric pain. No nausea, vomiting, or hematemesis; No diarrhea or constipation. No melena or hematochezia.  GENITOURINARY: No dysuria, frequency or hematuria  NEUROLOGICAL: No numbness or weakness  SKIN: No itching, rashes      Physical Exam:  Vital Signs Last 24 Hrs  T(C): 37 (06 Nov 2023 15:30), Max: 37.8 (06 Nov 2023 06:00)  T(F): 98.6 (06 Nov 2023 15:30), Max: 100 (06 Nov 2023 06:00)  HR: 87 (06 Nov 2023 18:45) (80 - 100)  BP: --  BP(mean): --  RR: 17 (06 Nov 2023 18:45) (13 - 37)  SpO2: 93% (06 Nov 2023 18:45) (89% - 96%)    Parameters below as of 06 Nov 2023 18:00  Patient On (Oxygen Delivery Method): nasal cannula  O2 Flow (L/min): 2    Incisions:    ============================I/O===========================   I&O's Detail    05 Nov 2023 07:01  -  06 Nov 2023 07:00  --------------------------------------------------------  IN:    Dexmedetomidine: 252.6 mL    Heparin: 318 mL    Nitroprusside: 431.4 mL    Oral Fluid: 620 mL    Sodium Chloride 0.9% Bolus: 500 mL  Total IN: 2122 mL    OUT:    Indwelling Catheter - Urethral (mL): 3455 mL  Total OUT: 3455 mL    Total NET: -1333 mL      06 Nov 2023 07:01  -  06 Nov 2023 20:44  --------------------------------------------------------  IN:    Dexmedetomidine: 118.8 mL    Heparin: 184.5 mL    Nitroprusside: 354 mL    Oral Fluid: 520 mL  Total IN: 1177.3 mL    OUT:    Indwelling Catheter - Urethral (mL): 2050 mL  Total OUT: 2050 mL    Total NET: -872.7 mL        ============================ LABS =========================                        12.0   11.01 )-----------( 130      ( 06 Nov 2023 01:54 )             35.4     11-06    129<L>  |  97  |  22  ----------------------------<  186<H>  3.7   |  18<L>  |  1.62<H>    Ca    8.7      06 Nov 2023 16:12  Phos  3.2     11-06  Mg     2.2     11-06    TPro  6.1  /  Alb  3.0<L>  /  TBili  0.7  /  DBili  x   /  AST  65<H>  /  ALT  53<H>  /  AlkPhos  40  11-06    LIVER FUNCTIONS - ( 06 Nov 2023 10:04 )  Alb: 3.0 g/dL / Pro: 6.1 g/dL / ALK PHOS: 40 U/L / ALT: 53 U/L / AST: 65 U/L / GGT: x           PTT - ( 06 Nov 2023 16:13 )  PTT:63.6 sec  ABG - ( 06 Nov 2023 03:17 )  pH, Arterial: 7.44  pH, Blood: x     /  pCO2: 32    /  pO2: 143   / HCO3: 22    / Base Excess: -1.7  /  SaO2: 99.1              Urinalysis Basic - ( 06 Nov 2023 16:12 )    Color: x / Appearance: x / SG: x / pH: x  Gluc: 186 mg/dL / Ketone: x  / Bili: x / Urobili: x   Blood: x / Protein: x / Nitrite: x   Leuk Esterase: x / RBC: x / WBC x   Sq Epi: x / Non Sq Epi: x / Bacteria: x      ======================Micro/Rad/Cardio=================  Culture: Reviewed   CXR: Reviewed  Echo:Reviewed  ======================================================  PAST MEDICAL & SURGICAL HISTORY:  HTN (hypertension)      CAD (coronary artery disease)  2009; stent      Intracranial hemorrhage  2008      Respiratory arrest  december 1st      Myocardial infarction, unspecified MI type, unspecified artery      History of coronary artery stent placement        ====================ASSESSMENT ==============  57 yo M with HTN, CAD (s/p PCI 2008), HFrEF (EF severely reduced 2018) not on GDMT, CVA 2018 (requiring tpA), DM presenting with chest pressure and unknown tachycardia that was shocked x1, LHC 11/1 found to have chronic total occlusion of LAD and RCA, with elevated RA and PA pressures, echo 11/1 w/ severely decreased EF 32%, s/p IABP 11/1, likely in cardiogenic shock 2/2 acute on chronic HF s/p unknown shockable rhythm.    ============================= NEUROLOGY =============================  -  A&Ox3    # Hx of CVA  - No neuro deficits known    # Anxiety  - Takes 0.5 ativan PRN at home  - c/w ativan 0.5 q6 hours ATCx3 days then PRN  - Wean off precedex  - Psych consult on Monday for medical management of anxiety    ============================= RESPIRATORY =============================    # AHRF 2/2 pulmonary edema requiring Intubation   - Extubated 11/3  - Wean O2 as tolerated, currently on 2L NC    # COVID +  - Maintain Airborne precautions  - No remdesivir or steroids per ID since respiratory failure not related to COVID    # Right sided opacification on CXR 11/2  - Pt. initially had cough, congestion, fever, was placed on antibiotics on Juan 10/29  - s/p levaquin (10/29-10/31)  - 11/6 CXR clear  - Watch off abx; vanc and cefepime (11/2)  - Sputum cx 11/2 NGTD    # Asthma (hx of respiratory failure in 2018- intubated for 20 minutes per chart review pt report)  - c/w albuterol qd  - c/w home trelegy     ============================= CARDIOVASCULAR =============================    # Cardiogenic shock 2/2 HF exacerbation requiring IABP (11/1-)  - 11/1 LHC: pLAD 100 % in-stent restenosis & mRCA, 100 %. PCWP 30. +IABP  - 11/1 TTE: LV dilated. EF 32 %. Regional WMAs present, mod (grade 2) LV diastolic dysfunction  - Per echo areas of akinesis are not new compared to 2017, but EF is severely decreased   - Currently on IABP 1:1, with means 80s, attempt wean today  - c/w nipride gtt for weaning of IABP  - c/w hydralazine and isordil for AL reduction  - Trend swan and CVP numbers  - Daily CXR for IABP  - Keep right leg straight while sheaths/IABP/[place] are in position     # Stent re-occlusion of pLAD and 100% occluded RCA  - CE cleared  - EKG here w/ LBBB  -c/w ASA, brillinta (takes plavix at home?)  -c/w lipitor 80  -c/w heparin gtt  -CT sx not recommending CABG, instead AT eval  - Viability study when IABP removed    # HFrEF w/ severely reduced EF 22%  - s/p lasix 40 11/1  - Lasix 80 today per HF (CVP 13)  - Start GDMT when stable  - CXR 11/6 clear   - Strict I&O  - Daily weights  - Fluid restriction  - On ozempic and dapaglifozin at home    # LV thrombus  - Seen on TTE 11/2  - c/w heparin gtt    # HTN  - Holding home losartan 25, HCTZ 25, coreg 25 for now    ============================= RENAL =============================  # ARIC  - Baseline Cr: 1-1.22  - Trend BMP, lytes and UOP    # Metabolic acidosis  Likely 2/2 lactate of 3.7, due to cardiogenic shock  - Resolved    ============================= GI =============================  # Transaminitis  - Likely iso cardiogenic shock  - AST/ALT: 94/50 on admission  - Improving, continue to trend    # GERD  - c/w home protonix  - Tolerating diet  - BM 11/6    # Bowel regimen  - c/w miralax and senna    ============================= ENDO =============================  # Type 2 DM  - A1c 8.3  - c/w lantus and ISS    ============================= HEMATOLOGIC =============================    # VTE prophylaxis   - H/H stable  - c/w heparin gtt    ============================= ID =============================    # Concern for aspiration event prior to intubation  - right sided opacification on CXR 11/2, clear today 11/6  - Last fever 11/4 (100.6) today  - Blood cultures 11/2 and 11/4 NGTD  - UA 11/4 negative  - MRSA swab negative  - Continue to monitor off ABx (s/p vanco and cefepime 11/2)  - ID following      Patient requires continuous monitoring with bedside rhythm monitoring, arterial line, pulse oximetry, ventilator monitoring and intermittent blood gas analysis.  Care plan discussed with ICU care team.  Patient remained critical; required more than usual post op care; I have spent 35 minutes providing non-routine post op care, revaluated multiple times during the day.

## 2023-11-06 NOTE — PROGRESS NOTE ADULT - PROBLEM SELECTOR PLAN 5
- Persistent fevers in the setting COVID infection with high SvO2s   - On empiric antibiotics   - F/u BCx data.

## 2023-11-06 NOTE — PROGRESS NOTE ADULT - ASSESSMENT
59 yo M with HTN, CAD (s/p PCI 2008), HFrEF (EF severely reduced 2018) not on GDMT, CVA 2018 (requiring tpA), DM presenting with c/o SOB for a week before presenting to the hospital. He had been treated for PNA. He was admitted with c/o chest pressure. Per family, patient passed out, EMS was called and reportedly defibrillated the patient. He was taken to [facility] where he was found to have ACS but he left AMA and came to [facility].    On arrival, ECG showed ST depression in lateral leads, no c/o chest pain. Pro-BNP 4k. Patient was intubated prior to LCHC on 11/1 for respiratory failure. LHC 11/1: pLAD 70 % stenosis in the ostium portion of the segment and 100 % in-stent restenosis. mRCA, 100 % stenosis. RA 23, PA 51/33, wedge 31, PA sat 54%, CI 1.5. S/p IABP on 11/1. He was successfully extubated to nasal canula on 11/3 and was also noted to be COVID positive currently with persistent fevers. Maintaining IABP for hemodynamic support in the setting of possible sepsis.       Cardiac Studies  11/1/23  [facility] showed pLAD 70 % stenosis in the ostium portion of the segment and 100 % in-stent restenosis and in mRCA, 100 % stenosis.   11/1/23 [facility]; RA 23 Vw 24, PA 52/33/40, PCWP 30 Vw 33, AO 85/66/73, PA sat 54.4%, CO/CI (F) 2.96/1.44, IABP was placed during the cath through R common femoral artery.   11/1/23 TTE: LVIDd 6.4cm , LVEF 32%, regional WMA, grade II DD,  TAPSE 1.7cm, mld MR, trace TR,     Bedside hemodynamics  11/3: IABP 1:1 RA 5; PA 27/12/18; CO/CI 5.6/2.6; MAP 90-100s.  11/4/23 IABP 1:3 CVP 4 PA 37/18 SvO2 83.8 CO/CI 11.2 CI 5.1  (in the setting of fever to 38.3C)  11/5 IABP 1:1 CVP 3 PA 48/27 SvO2 78.4% CO 8.2 CI 3.7

## 2023-11-06 NOTE — PROGRESS NOTE ADULT - SUBJECTIVE AND OBJECTIVE BOX
PATIENT:  LD VALENCIA  [id number]    CHIEF COMPLAINT:  Patient is a 58y old male who presents with a chief complaint of CP/SOB    INTERVAL HISTORYOVERNIGHT EVENTS:  - Attempted to wean IABP overnight, repeat LFTS and BUN/Cr. increased so placed back on 1:1 support      REVIEW OF SYSTEMS:    Constitutional:     [X] negative [ ] fevers [ ] chills [ ] weight loss [ ] weight gain  HEENT:                  [X] negative [ ] dry eyes [ ] eye irritation [ ] postnasal drip [ ] nasal congestion  CV:                         [X] negative  [ ] chest pain [ ] orthopnea [ ] palpitations [ ] murmur  Resp:                     [X] negative [ ] cough [ ] shortness of breath [ ] dyspnea [ ] wheezing [ ] sputum [ ] hemoptysis  GI:                          [X] negative [ ] nausea [ ] vomiting [ ] diarrhea [ ] constipation [ ] abd pain [ ] dysphagia   Skin:                       [X] negative [ ] rash [ ] itch  Neurological:        [ ] negative [ ] headache [ ] dizziness [ ] syncope [ ] weakness [ ] numbness  Psychiatric:           [ ] negative [ ] anxiety [ ] depression  Endocrine:            [ ] negative [ ] diabetes [ ] thyroid problem  Heme/Lymph:      [ ] negative [ ] anemia [ ] bleeding problem  Allergic/Immune: [ ] negative [ ] itchy eyes [ ] nasal discharge [ ] hives [ ] angioedema    [ ] All other systems negative  [ ] Unable to assess ROS because ________.    MEDICATIONS:  MEDICATIONS  (STANDING):  albuterol    0.083% 2.5 milliGRAM(s) Nebulizer daily  aspirin  chewable 81 milliGRAM(s) Oral daily  atorvastatin 80 milliGRAM(s) Oral at bedtime  budesonide  80 MICROgram(s)/formoterol 4.5 MICROgram(s) Inhaler 2 Puff(s) Inhalation two times a day  chlorhexidine 2% Cloths 1 Application(s) Topical daily  dexMEDEtomidine Infusion 0.5 MICROgram(s)/kG/Hr (12.4 mL/Hr) IV Continuous <Continuous>  dextrose 50% Injectable 25 Gram(s) IV Push once  dextrose 50% Injectable 12.5 Gram(s) IV Push once  dextrose 50% Injectable 25 Gram(s) IV Push once  furosemide   Injectable 80 milliGRAM(s) IV Push daily  glucagon  Injectable 1 milliGRAM(s) IntraMuscular once  heparin  Infusion 950 Unit(s)/Hr (16 mL/Hr) IV Continuous <Continuous>  hydrALAZINE 40 milliGRAM(s) Oral every 8 hours  insulin glargine Injectable (LANTUS) 8 Unit(s) SubCutaneous at bedtime  insulin lispro (ADMELOG) corrective regimen sliding scale   SubCutaneous three times a day before meals  insulin lispro (ADMELOG) corrective regimen sliding scale   SubCutaneous at bedtime  isosorbide   dinitrate Tablet (ISORDIL) 30 milliGRAM(s) Oral three times a day  LORazepam     Tablet 0.5 milliGRAM(s) Oral every 6 hours  nitroprusside Infusion 0.5 MICROgram(s)/kG/Min (7.42 mL/Hr) IV Continuous <Continuous>  nystatin    Suspension 057117 Unit(s) Oral every 6 hours  pantoprazole    Tablet 40 milliGRAM(s) Oral before breakfast  polyethylene glycol 3350 17 Gram(s) Oral daily  potassium phosphate / sodium phosphate Powder (PHOS-NaK) 1 Packet(s) Oral once  sodium chloride 0.65% Nasal 1 Spray(s) Both Nostrils three times a day  spironolactone 25 milliGRAM(s) Oral daily  ticagrelor 90 milliGRAM(s) Oral every 12 hours  tiotropium 2.5 MICROgram(s) Inhaler 2 Puff(s) Inhalation daily    MEDICATIONS  (PRN):  dextrose Oral Gel 15 Gram(s) Oral once PRN Blood Glucose LESS THAN 70 milliGRAM(s)/deciliter      ALLERGIES:  Allergies    penicillins (Unknown)    Intolerances        OBJECTIVE:  ICU Vital Signs Last 24 Hrs  T(C): 37.8 (06 Nov 2023 06:00), Max: 37.8 (06 Nov 2023 06:00)  T(F): 100 (06 Nov 2023 06:00), Max: 100 (06 Nov 2023 06:00)  HR: 96 (06 Nov 2023 11:45) (85 - 100)  BP: --  BP(mean): --  ABP: 105/60 (06 Nov 2023 11:45) (82/46 - 135/71)  ABP(mean): 82 (06 Nov 2023 11:45) (59 - 99)  RR: 19 (06 Nov 2023 11:45) (14 - 98)  SpO2: 94% (06 Nov 2023 11:45) (89% - 96%)    O2 Parameters below as of 06 Nov 2023 10:00  Patient On (Oxygen Delivery Method): nasal cannula  O2 Flow (L/min): 2          Adult Advanced Hemodynamics Last 24 Hrs  CVP(mm Hg): 14 (06 Nov 2023 11:45) (5 - 39)  CVP(cm H2O): --  CO: --  CI: --  PA: 37/20 (06 Nov 2023 11:45) (20/5 - 181/170)  PA(mean): 26 (06 Nov 2023 11:45) (11 - 173)  PCWP: --  SVR: --  SVRI: --  PVR: --  PVRI: --  CAPILLARY BLOOD GLUCOSE      POCT Blood Glucose.: 183 mg/dL (06 Nov 2023 12:30)  POCT Blood Glucose.: 173 mg/dL (06 Nov 2023 08:48)  POCT Blood Glucose.: 161 mg/dL (05 Nov 2023 21:56)  POCT Blood Glucose.: 153 mg/dL (05 Nov 2023 16:31)    CAPILLARY BLOOD GLUCOSE      POCT Blood Glucose.: 183 mg/dL (06 Nov 2023 12:30)    I&O's Summary    05 Nov 2023 07:01  -  06 Nov 2023 07:00  --------------------------------------------------------  IN: 2122 mL / OUT: 3455 mL / NET: -1333 mL    06 Nov 2023 07:01  -  06 Nov 2023 12:48  --------------------------------------------------------  IN: 390 mL / OUT: 220 mL / NET: 170 mL      Daily     Daily     PHYSICAL EXAMINATION:  General: WN/WD NAD  HEENT: PERRLA, EOMI, moist mucous membranes  Neurology: A&Ox3, nonfocal, MOISE x 4  Respiratory: CTA B/L, normal respiratory effort, no wheezes, crackles, rales  CV: RRR, S1S2, no murmurs, rubs or gallops  Abdominal: Soft, NT, ND +BS, Last BM  Extremities: No edema, + peripheral pulses  Incisions:   Tubes:    LABS:  ABG - ( 06 Nov 2023 03:17 )  pH, Arterial: 7.44  pH, Blood: x     /  pCO2: 32    /  pO2: 143   / HCO3: 22    / Base Excess: -1.7  /  SaO2: 99.1                 12.0   11.01 )-----------( 130      ( 06 Nov 2023 01:54 )             35.4     11-06    131<L>  |  100  |  20  ----------------------------<  150<H>  3.8   |  17<L>  |  1.46<H>    Ca    8.8      06 Nov 2023 10:04  Phos  2.4     11-06  Mg     2.0     11-06    TPro  6.1  /  Alb  3.0<L>  /  TBili  0.7  /  DBili  x   /  AST  65<H>  /  ALT  53<H>  /  AlkPhos  40  11-06    LIVER FUNCTIONS - ( 06 Nov 2023 10:04 )  Alb: 3.0 g/dL / Pro: 6.1 g/dL / ALK PHOS: 40 U/L / ALT: 53 U/L / AST: 65 U/L / GGT: x           PTT - ( 06 Nov 2023 10:04 )  PTT:55.4 sec    Urinalysis Basic - ( 06 Nov 2023 10:04 )    Culture - Blood (collected 04 Nov 2023 10:40)  Source: .Blood Blood-Peripheral  Preliminary Report (05 Nov 2023 14:02):    No growth at 24 hours    Culture - Blood (collected 04 Nov 2023 10:40)  Source: .Blood Blood-Peripheral  Preliminary Report (05 Nov 2023 14:02):    No growth at 24 hours       PATIENT:  LD VALENCIA  [id number]    CHIEF COMPLAINT:  Patient is a 58y old male who presents with a chief complaint of CP/SOB    INTERVAL HISTORY/OVERNIGHT EVENTS:  - Attempted to wean IABP overnight, repeat LFTS and BUN/Cr. increased so placed back on 1:1 support    REVIEW OF SYSTEMS:    Constitutional:     [X] negative [ ] fevers [ ] chills [ ] weight loss [ ] weight gain  HEENT:                  [X] negative [ ] dry eyes [ ] eye irritation [ ] postnasal drip [ ] nasal congestion  CV:                         [X] negative  [ ] chest pain [ ] orthopnea [ ] palpitations [ ] murmur  Resp:                     [X] negative [ ] cough [ ] shortness of breath [ ] dyspnea [ ] wheezing [ ] sputum [ ] hemoptysis  GI:                          [X] negative [ ] nausea [ ] vomiting [ ] diarrhea [ ] constipation [ ] abd pain [ ] dysphagia   Skin:                       [X] negative [ ] rash [ ] itch  Neurological:        [X] negative [ ] headache [ ] dizziness [ ] syncope [ ] weakness [ ] numbness  Psychiatric:           [X] negative [ ] anxiety [ ] depression    [X] All other systems negative    MEDICATIONS:  MEDICATIONS  (STANDING):  albuterol    0.083% 2.5 milliGRAM(s) Nebulizer daily  aspirin  chewable 81 milliGRAM(s) Oral daily  atorvastatin 80 milliGRAM(s) Oral at bedtime  budesonide  80 MICROgram(s)/formoterol 4.5 MICROgram(s) Inhaler 2 Puff(s) Inhalation two times a day  chlorhexidine 2% Cloths 1 Application(s) Topical daily  dexMEDEtomidine Infusion 0.5 MICROgram(s)/kG/Hr (12.4 mL/Hr) IV Continuous <Continuous>  dextrose 50% Injectable 25 Gram(s) IV Push once  dextrose 50% Injectable 12.5 Gram(s) IV Push once  dextrose 50% Injectable 25 Gram(s) IV Push once  furosemide   Injectable 80 milliGRAM(s) IV Push daily  glucagon  Injectable 1 milliGRAM(s) IntraMuscular once  heparin  Infusion 950 Unit(s)/Hr (16 mL/Hr) IV Continuous <Continuous>  hydrALAZINE 40 milliGRAM(s) Oral every 8 hours  insulin glargine Injectable (LANTUS) 8 Unit(s) SubCutaneous at bedtime  insulin lispro (ADMELOG) corrective regimen sliding scale   SubCutaneous three times a day before meals  insulin lispro (ADMELOG) corrective regimen sliding scale   SubCutaneous at bedtime  isosorbide   dinitrate Tablet (ISORDIL) 30 milliGRAM(s) Oral three times a day  LORazepam     Tablet 0.5 milliGRAM(s) Oral every 6 hours  nitroprusside Infusion 0.5 MICROgram(s)/kG/Min (7.42 mL/Hr) IV Continuous <Continuous>  nystatin    Suspension 344037 Unit(s) Oral every 6 hours  pantoprazole    Tablet 40 milliGRAM(s) Oral before breakfast  polyethylene glycol 3350 17 Gram(s) Oral daily  potassium phosphate / sodium phosphate Powder (PHOS-NaK) 1 Packet(s) Oral once  sodium chloride 0.65% Nasal 1 Spray(s) Both Nostrils three times a day  spironolactone 25 milliGRAM(s) Oral daily  ticagrelor 90 milliGRAM(s) Oral every 12 hours  tiotropium 2.5 MICROgram(s) Inhaler 2 Puff(s) Inhalation daily    MEDICATIONS  (PRN):  dextrose Oral Gel 15 Gram(s) Oral once PRN Blood Glucose LESS THAN 70 milliGRAM(s)/deciliter    ALLERGIES:  penicillins (Unknown)    OBJECTIVE:  ICU Vital Signs Last 24 Hrs  T(C): 37.8 (06 Nov 2023 06:00), Max: 37.8 (06 Nov 2023 06:00)  T(F): 100 (06 Nov 2023 06:00), Max: 100 (06 Nov 2023 06:00)  HR: 96 (06 Nov 2023 11:45) (85 - 100)  ABP: 105/60 (06 Nov 2023 11:45) (82/46 - 135/71)  ABP(mean): 82 (06 Nov 2023 11:45) (59 - 99)  RR: 19 (06 Nov 2023 11:45) (14 - 98)  SpO2: 94% (06 Nov 2023 11:45) (89% - 96%)    O2 Parameters below as of 06 Nov 2023 10:00  Patient On (Oxygen Delivery Method): nasal cannula  O2 Flow (L/min): 2    Adult Advanced Hemodynamics Last 24 Hrs  CVP(mm Hg): 14 (06 Nov 2023 11:45) (5 - 39)  CVP(cm H2O): --  CO: --  CI: --  PA: 37/20 (06 Nov 2023 11:45) (20/5 - 181/170)  PA(mean): 26 (06 Nov 2023 11:45) (11 - 173)  PCWP: --  SVR: --  SVRI: --  PVR: --  PVRI: --    CAPILLARY BLOOD GLUCOSE  POCT Blood Glucose.: 183 mg/dL (06 Nov 2023 12:30)  POCT Blood Glucose.: 173 mg/dL (06 Nov 2023 08:48)  POCT Blood Glucose.: 161 mg/dL (05 Nov 2023 21:56)  POCT Blood Glucose.: 153 mg/dL (05 Nov 2023 16:31)    I&O's Summary    05 Nov 2023 07:01  -  06 Nov 2023 07:00  --------------------------------------------------------  IN: 2122 mL / OUT: 3455 mL / NET: -1333 mL    06 Nov 2023 07:01  -  06 Nov 2023 12:48  --------------------------------------------------------  IN: 390 mL / OUT: 220 mL / NET: 170 mL    PHYSICAL EXAMINATION:  General: Awake and alert, in NAD  HEENT: PERRL, moist mucous membranes  Neurology: A&Ox3, nonfocal, MOISE x 4  Respiratory: CTA B/L, normal respiratory effort, no wheezes, crackles, rales  CV: + IABP  Abdominal: Soft, NT, ND +BS,  Extremities: Warm, no edema, + peripheral pulses    LABS:  ABG - ( 06 Nov 2023 03:17 )  pH, Arterial: 7.44  pH, Blood: x     /  pCO2: 32    /  pO2: 143   / HCO3: 22    / Base Excess: -1.7  /  SaO2: 99.1                 12.0   11.01 )-----------( 130      ( 06 Nov 2023 01:54 )             35.4     11-06    131<L>  |  100  |  20  ----------------------------<  150<H>  3.8   |  17<L>  |  1.46<H>    Ca    8.8      06 Nov 2023 10:04  Phos  2.4     11-06  Mg     2.0     11-06    TPro  6.1  /  Alb  3.0<L>  /  TBili  0.7  /  DBili  x   /  AST  65<H>  /  ALT  53<H>  /  AlkPhos  40  11-06    LIVER FUNCTIONS - ( 06 Nov 2023 10:04 )  Alb: 3.0 g/dL / Pro: 6.1 g/dL / ALK PHOS: 40 U/L / ALT: 53 U/L / AST: 65 U/L / GGT: x           PTT - ( 06 Nov 2023 10:04 )  PTT:55.4 sec    Urinalysis Basic - ( 06 Nov 2023 10:04 )    Culture - Blood (collected 04 Nov 2023 10:40)  Source: .Blood Blood-Peripheral  Preliminary Report (05 Nov 2023 14:02):    No growth at 24 hours    Culture - Blood (collected 04 Nov 2023 10:40)  Source: .Blood Blood-Peripheral  Preliminary Report (05 Nov 2023 14:02):    No growth at 24 hours

## 2023-11-06 NOTE — PROGRESS NOTE ADULT - SUBJECTIVE AND OBJECTIVE BOX
LD VALENCIA  58y Male  MRN:[id number]    Patient is a 58y old  Male who presents with a chief complaint of NSTEMI (01 Nov 2023 20:29)    HPI:  57yo M w/ hx HTN, CAD w/ 1 stent in 2009, ICH (2008) presenting with abn ekg. Patient presented to [facility] where he was found to have STEMI, recommended to get cath however patient did not want to get it there so it left and came here.  Patient initially had cough, congestion, fever, was placed on antibiotics on Sunday.  Started feeling nauseous and had a presyncopal event after which he presented to ED last night.  Had chest pain as well.  Chest pain is midsternal.  Not currently having chest pain.  Received 4 aspirin 30 min pta. (01 Nov 2023 15:11)      Patient seen and evaluated at bedside in CCU. interval events noted    Interval HPI: no acute events o/n     PAST MEDICAL & SURGICAL HISTORY:  HTN (hypertension)      CAD (coronary artery disease)  2009; stent      Intracranial hemorrhage  2008      Respiratory arrest  december 1st      Myocardial infarction, unspecified MI type, unspecified artery      History of coronary artery stent placement          REVIEW OF SYSTEMS:  as per hpi     VITALS:   ICU Vital Signs Last 24 Hrs  T(C): 37 (06 Nov 2023 15:30), Max: 37.8 (06 Nov 2023 06:00)  T(F): 98.6 (06 Nov 2023 15:30), Max: 100 (06 Nov 2023 06:00)  HR: 86 (06 Nov 2023 15:30) (84 - 100)  BP: --  BP(mean): --  ABP: 91/55 (06 Nov 2023 15:30) (82/46 - 135/71)  ABP(mean): 75 (06 Nov 2023 15:30) (59 - 99)  RR: 21 (06 Nov 2023 15:30) (14 - 98)  SpO2: 92% (06 Nov 2023 15:30) (89% - 96%)    O2 Parameters below as of 06 Nov 2023 15:30  Patient On (Oxygen Delivery Method): room air       PHYSICAL EXAM:  GENERAL: NAD, well-developed  HEAD:  Atraumatic, Normocephalic  EYES: EOMI, PERRLA, conjunctiva and sclera clear  NECK: Supple, No JVD  CHEST/LUNG: Clear to auscultation bilaterally; No wheeze  HEART: Regular rate and rhythm; No murmurs, rubs, or gallops  ABDOMEN: Soft, Nontender, Nondistended; Bowel sounds present  EXTREMITIES:  2+ Peripheral Pulses, No clubbing, cyanosis, or edema  PSYCH: AAOx3  NEUROLOGY: non-focal  SKIN: No rashes or lesions    Consultant(s) Notes Reviewed:  [x ] YES  [ ] NO  Care Discussed with Consultants/Other Providers [ x] YES  [ ] NO    MEDS:   MEDICATIONS  (STANDING):  albuterol    0.083% 2.5 milliGRAM(s) Nebulizer daily  aspirin  chewable 81 milliGRAM(s) Oral daily  atorvastatin 80 milliGRAM(s) Oral at bedtime  budesonide  80 MICROgram(s)/formoterol 4.5 MICROgram(s) Inhaler 2 Puff(s) Inhalation two times a day  chlorhexidine 2% Cloths 1 Application(s) Topical daily  dexMEDEtomidine Infusion 0.5 MICROgram(s)/kG/Hr (12.4 mL/Hr) IV Continuous <Continuous>  dextrose 50% Injectable 25 Gram(s) IV Push once  dextrose 50% Injectable 12.5 Gram(s) IV Push once  dextrose 50% Injectable 25 Gram(s) IV Push once  furosemide   Injectable 80 milliGRAM(s) IV Push daily  glucagon  Injectable 1 milliGRAM(s) IntraMuscular once  heparin  Infusion 950 Unit(s)/Hr (16 mL/Hr) IV Continuous <Continuous>  hydrALAZINE 40 milliGRAM(s) Oral every 8 hours  insulin glargine Injectable (LANTUS) 8 Unit(s) SubCutaneous at bedtime  insulin lispro (ADMELOG) corrective regimen sliding scale   SubCutaneous three times a day before meals  insulin lispro (ADMELOG) corrective regimen sliding scale   SubCutaneous at bedtime  isosorbide   dinitrate Tablet (ISORDIL) 30 milliGRAM(s) Oral three times a day  LORazepam     Tablet 0.5 milliGRAM(s) Oral every 6 hours  nitroprusside Infusion 0.5 MICROgram(s)/kG/Min (7.42 mL/Hr) IV Continuous <Continuous>  nystatin    Suspension 228416 Unit(s) Oral every 6 hours  pantoprazole    Tablet 40 milliGRAM(s) Oral before breakfast  polyethylene glycol 3350 17 Gram(s) Oral daily  sodium chloride 0.65% Nasal 1 Spray(s) Both Nostrils three times a day  spironolactone 25 milliGRAM(s) Oral daily  ticagrelor 90 milliGRAM(s) Oral every 12 hours  tiotropium 2.5 MICROgram(s) Inhaler 2 Puff(s) Inhalation daily    MEDICATIONS  (PRN):  dextrose Oral Gel 15 Gram(s) Oral once PRN Blood Glucose LESS THAN 70 milliGRAM(s)/deciliter      ALLERGIES:  penicillins (Unknown)      LABS:                                                                12.0   11.01 )-----------( 130      ( 06 Nov 2023 01:54 )             35.4    11-06    131<L>  |  100  |  20  ----------------------------<  150<H>  3.8   |  17<L>  |  1.46<H>    Ca    8.8      06 Nov 2023 10:04  Phos  2.4     11-06  Mg     2.0     11-06    TPro  6.1  /  Alb  3.0<L>  /  TBili  0.7  /  DBili  x   /  AST  65<H>  /  ALT  53<H>  /  AlkPhos  40  11-06         < from: Xray Chest 1 View- PORTABLE-Urgent (11.01.23 @ 07:42) >    IMPRESSION:  Clear lungs.    ---End of Report ---        < end of copied text >  < from: TTE W or WO Ultrasound Enhancing Agent (11.01.23 @ 10:23) >  _____________________________     CONCLUSIONS:      1. Left ventricular cavity is moderately dilated. Left ventricular wall thickness is normal. Left ventricular systolic function is severely decreased with an ejection fraction of 32 % by Chinchilla's method of disks. Regional wall motion abnormalities present.   2. Multiple segmental abnormalities exist. See findings.   3. There is moderate (grade 2) left ventricular diastolic dysfunction, with indeterminant filling pressure.   4. Normal right ventricular cavity size, wall thickness, and systolic function.   5. No significant valvular disease.   6. No pericardial effusion seen.   7. Compared to the transthoracic echocardiogram performed on 1/25/2017 the areas of akinesis are unchanged but there has been a decline in LV systolic function with new areas of hypokinesis.    __________________________________________________________________    < end of copied text >  < from: TTE Limited W or WO Ultrasound Enhancing Agent (11.02.23 @ 07:41) >  __________________________     CONCLUSIONS:      1. After obtaining consent, Definity ultrasound enhancing agent was given for enhanced left ventricular opacification and improved delineation of the left ventricular endocardial borders. Left ventricular systolic function is severely decreased with a calculated ejection fraction of 22 % by the Chinchilla's biplane method of disks. There is a left ventricular thrombus.   2. Findings were discussed with Litzy BOSS on 11/2/2023 at 8.49am.   3. There is a left ventricular thrombus.    _________________________________________________________________    < end of copied text >

## 2023-11-06 NOTE — PROGRESS NOTE ADULT - ASSESSMENT
57 yo M with PMHx of HTN, CAD w/ 1 stent in 2009, ICH (2008) presented on 11/1 with abn ekg. Patient presented to [facility] where he was found to have STEMI, recommended to get cath however patient did not want to get it there so he left and came here.   EKG here with ST depression in lateral leads and elevation in anterior leads   Prior to [facility] found to be tachycardic, dyspneic, intubated   [facility] 11/1 with chronic total occlusion of LAD and RCA, with elevated RA and PA pressures  TTE 11/1 with severely decreased EF 32%, s/p IABP 11/1  Afebrile   No leukocytosis   Reportedly had an outpatient course of Levofloxacin for pneumonia   Tested + Covid   CXR: clear lungs     Antimicrobials:  Cefepime   Vancomycin     #Acute hypoxic respiratory failure on ventilator likely 2/2 to cardiogenic shock in the setting of STEMI, currently on low Fi02 and no secretions, low concern of pneumonia on CXR   #Covid infection   Respiratory failure, suspected cardiac basis.  Per discussion with critical care team indication for antibiotics was foaming at the mouth and concern of aspiration event around that time.  No clear convincing evidence of aspiration at this juncture.  Chest exam is fairly clear, imaging without clear convincing evidence of pneumonia, FiO2 requirement minimal.  If there is concern about potential pneumonia despite benign appearing chest x-ray, would check POCUS.  However unless less stronger support emerges for antibiotic use, I would discontinue them.  With respect to COVID, the respiratory failure is unrelated to this diagnosis.  I do not think he merits remdesivir or steroids at this juncture.    11/3: No concern for infection other than COVID. Latter incidental at this point in time. I do not think it merits treatment.   11/6: low grade fever, able to stay extubated. Heart failure team advocating remdesivir Rx. Prior to this he was afebrile. Presently no objection to RDV since he is now febrile but not clear how much benefit, if any, he will get. Presently not hypoxic and I continue to think prior respiratory failure was on a cardiac basis, not COVID. As such I do not think steroids are warranted. Patient has improved, and remained extubated, despite being off antibiotics. Patient was inquiring whether antibiotics would prevent infection in his circumstance, d/w him that prophylactic antibiotics not indicated here, and that inappropriate use would select resistant kaitlynn without giving him benefit.     Suggestions--  I see no role for antibiotics here  I do not object to remdesivir but low expectations as to how much it will help, if at all  Remove invasive devices as feasible  Monitor temperature curve    Enrique Arrington MD  Attending Physician  [facility]  Division of Infectious Diseases  [phone]

## 2023-11-06 NOTE — PROGRESS NOTE ADULT - SUBJECTIVE AND OBJECTIVE BOX
[facility]  Division of Infectious Diseases  [phone]    Name: LD VALENCIA  Age: 58y  Gender: Male  MRN: [id number]    Interval History--  Notes reviewed.     Past Medical History--  HTN (hypertension)    CAD (coronary artery disease)    Intracranial hemorrhage    Respiratory arrest    Myocardial infarction, unspecified MI type, unspecified artery    History of coronary artery stent placement        For details regarding the patient's social history, family history, and other miscellaneous elements, please refer the initial infectious diseases consultation and/or the admitting history and physical examination for this admission.    Allergies    penicillins (Unknown)    Intolerances        Medications--  Antibiotics:  nystatin    Suspension 464397 Unit(s) Oral every 6 hours    Immunologic:    Other:  albuterol    0.083%  aspirin  chewable  atorvastatin  budesonide  80 MICROgram(s)/formoterol 4.5 MICROgram(s) Inhaler  chlorhexidine 2% Cloths  dexMEDEtomidine Infusion  dextrose 50% Injectable  dextrose 50% Injectable  dextrose 50% Injectable  dextrose Oral Gel PRN  furosemide   Injectable  glucagon  Injectable  heparin  Infusion  hydrALAZINE  insulin glargine Injectable (LANTUS)  insulin lispro (ADMELOG) corrective regimen sliding scale  insulin lispro (ADMELOG) corrective regimen sliding scale  isosorbide   dinitrate Tablet (ISORDIL)  LORazepam     Tablet  nitroprusside Infusion  pantoprazole    Tablet  polyethylene glycol 3350  sodium chloride 0.65% Nasal  spironolactone  ticagrelor  tiotropium 2.5 MICROgram(s) Inhaler      Review of Systems--  A 10-point review of systems was obtained.     Pertinent positives and negatives--  Constitutional: No fevers. No Chills. No Rigors.   Cardiovascular: No chest pain. No palpitations.  Respiratory: No shortness of breath. No cough.  Gastrointestinal: No nausea or vomiting. No diarrhea or constipation.   Psychiatric: Pleasant. Appropriate affect.    Review of systems otherwise negative except as previously noted.    Physical Examination--  Vital Signs: T(F): 97.9 (11-06-23 @ 12:00), Max: 100 (11-06-23 @ 06:00)  HR: 90 (11-06-23 @ 12:45)  BP: --  RR: 17 (11-06-23 @ 12:45)  SpO2: 93% (11-06-23 @ 12:45)  Wt(kg): --      Laboratory Studies--  CBC                        12.0   11.01 )-----------( 130      ( 06 Nov 2023 01:54 )             35.4       Chemistries  11-06    131<L>  |  100  |  20  ----------------------------<  150<H>  3.8   |  17<L>  |  1.46<H>    Ca    8.8      06 Nov 2023 10:04  Phos  2.4     11-06  Mg     2.0     11-06    TPro  6.1  /  Alb  3.0<L>  /  TBili  0.7  /  DBili  x   /  AST  65<H>  /  ALT  53<H>  /  AlkPhos  40  11-06      Culture Data    Culture - Blood (collected 04 Nov 2023 10:40)  Source: .Blood Blood-Peripheral  Preliminary Report (05 Nov 2023 14:02):    No growth at 24 hours    Culture - Blood (collected 04 Nov 2023 10:40)  Source: .Blood Blood-Peripheral  Preliminary Report (05 Nov 2023 14:02):    No growth at 24 hours    Culture - Sputum (collected 02 Nov 2023 15:17)  Source: ET Tube ET Tube  Gram Stain (03 Nov 2023 06:50):    Rare polymorphonuclear leukocytes per low power field    No Squamous epithelial cells per low power field    No organisms seen per oil power field  Final Report (04 Nov 2023 14:31):    No growth to date.    Culture - Blood (collected 02 Nov 2023 14:46)  Source: .Blood Blood  Preliminary Report (05 Nov 2023 19:01):    No growth at 72 Hours    Culture - Blood (collected 02 Nov 2023 14:34)  Source: .Blood Blood  Preliminary Report (05 Nov 2023 19:01):    No growth at 72 Hours             [facility]  Division of Infectious Diseases  [phone]    Name: LD VALENCIA  Age: 58y  Gender: Male  MRN: [id number]    Interval History--  Notes reviewed. Low grade fever. C/O needing anxiety medications that were prescribed for him "It starts with an F, or S... " but he can't remember the name. Denies SOB except when he gets anxious. Not wearing his NCO2, sat 95%. Numerous questions regarding his cardiac status which I referred to the CICU team.        Past Medical History--  HTN (hypertension)    CAD (coronary artery disease)    Intracranial hemorrhage    Respiratory arrest    Myocardial infarction, unspecified MI type, unspecified artery    History of coronary artery stent placement        For details regarding the patient's social history, family history, and other miscellaneous elements, please refer the initial infectious diseases consultation and/or the admitting history and physical examination for this admission.    Allergies    penicillins (Unknown)    Intolerances        Medications--  Antibiotics:  nystatin    Suspension 734630 Unit(s) Oral every 6 hours    Immunologic:    Other:  albuterol    0.083%  aspirin  chewable  atorvastatin  budesonide  80 MICROgram(s)/formoterol 4.5 MICROgram(s) Inhaler  chlorhexidine 2% Cloths  dexMEDEtomidine Infusion  dextrose 50% Injectable  dextrose 50% Injectable  dextrose 50% Injectable  dextrose Oral Gel PRN  furosemide   Injectable  glucagon  Injectable  heparin  Infusion  hydrALAZINE  insulin glargine Injectable (LANTUS)  insulin lispro (ADMELOG) corrective regimen sliding scale  insulin lispro (ADMELOG) corrective regimen sliding scale  isosorbide   dinitrate Tablet (ISORDIL)  LORazepam     Tablet  nitroprusside Infusion  pantoprazole    Tablet  polyethylene glycol 3350  sodium chloride 0.65% Nasal  spironolactone  ticagrelor  tiotropium 2.5 MICROgram(s) Inhaler      Review of Systems--  A 10-point review of systems was obtained.   Review of systems otherwise negative except as previously noted.    Physical Examination--  Vital Signs: T(F): 97.9 (11-06-23 @ 12:00), Max: 100 (11-06-23 @ 06:00)  HR: 90 (11-06-23 @ 12:45)  BP: --  RR: 17 (11-06-23 @ 12:45)  SpO2: 93% (11-06-23 @ 12:45)  Wt(kg): --  General: Nontoxic-appearing Male in no acute distress.  HEENT: AT/NC. OETT. Anicteric. Conjunctiva pink and moist. Vaughn grossly clear   Neck: Not rigid. No sense of mass.  Nodes: None palpable.  Lungs: Diminished BS no RWR  Heart: RRR  Abdomen: Bowel sounds present and normoactive. Soft. Nondistended. Nontender.  Back: Unable  Extremities: No cyanosis or clubbing. 1+ edema. IABP SGC R groin  Skin: Warm. Dry. Good turgor. No rash. No vasculitic stigmata.  Psychiatric: Anxious out of proportion to circumstance with perseverative component. Mood and affect congruent.       Laboratory Studies--  CBC                        12.0   11.01 )-----------( 130      ( 06 Nov 2023 01:54 )             35.4       Chemistries  11-06    131<L>  |  100  |  20  ----------------------------<  150<H>  3.8   |  17<L>  |  1.46<H>    Ca    8.8      06 Nov 2023 10:04  Phos  2.4     11-06  Mg     2.0     11-06    TPro  6.1  /  Alb  3.0<L>  /  TBili  0.7  /  DBili  x   /  AST  65<H>  /  ALT  53<H>  /  AlkPhos  40  11-06      Culture Data    Culture - Blood (collected 04 Nov 2023 10:40)  Source: .Blood Blood-Peripheral  Preliminary Report (05 Nov 2023 14:02):    No growth at 24 hours    Culture - Blood (collected 04 Nov 2023 10:40)  Source: .Blood Blood-Peripheral  Preliminary Report (05 Nov 2023 14:02):    No growth at 24 hours    Culture - Sputum (collected 02 Nov 2023 15:17)  Source: ET Tube ET Tube  Gram Stain (03 Nov 2023 06:50):    Rare polymorphonuclear leukocytes per low power field    No Squamous epithelial cells per low power field    No organisms seen per oil power field  Final Report (04 Nov 2023 14:31):    No growth to date.    Culture - Blood (collected 02 Nov 2023 14:46)  Source: .Blood Blood  Preliminary Report (05 Nov 2023 19:01):    No growth at 72 Hours    Culture - Blood (collected 02 Nov 2023 14:34)  Source: .Blood Blood  Preliminary Report (05 Nov 2023 19:01):    No growth at 72 Hours

## 2023-11-06 NOTE — PROGRESS NOTE ADULT - SUBJECTIVE AND OBJECTIVE BOX
Patient seen and examined at bedside.    Overnight Events:  NAEON.   TELE: No events  No concerns or complaints this am.   Failed IABP wean     REVIEW OF SYSTEMS:  CONSTITUTIONAL: No weakness, fevers or chills  EYES/ENT: No visual changes;  No dysphagia  NECK: No pain or stiffness  RESPIRATORY: No cough, wheezing, hemoptysis; No shortness of breath  CARDIOVASCULAR: No chest pain or palpitations; No lower extremity edema  GASTROINTESTINAL: No abdominal or epigastric pain. No nausea, vomiting  BACK: No back pain  GENITOURINARY: No dysuria, frequency or hematuria  NEUROLOGICAL: No numbness or weakness  SKIN: No itching, burning, rashes, or lesions   All other review of systems is negative unless indicated above.        Current Meds:  albuterol    0.083% 2.5 milliGRAM(s) Nebulizer daily  aspirin  chewable 81 milliGRAM(s) Oral daily  atorvastatin 80 milliGRAM(s) Oral at bedtime  budesonide  80 MICROgram(s)/formoterol 4.5 MICROgram(s) Inhaler 2 Puff(s) Inhalation two times a day  chlorhexidine 2% Cloths 1 Application(s) Topical daily  dexMEDEtomidine Infusion 0.5 MICROgram(s)/kG/Hr IV Continuous <Continuous>  dextrose 50% Injectable 25 Gram(s) IV Push once  dextrose 50% Injectable 12.5 Gram(s) IV Push once  dextrose 50% Injectable 25 Gram(s) IV Push once  dextrose Oral Gel 15 Gram(s) Oral once PRN  furosemide   Injectable 80 milliGRAM(s) IV Push daily  glucagon  Injectable 1 milliGRAM(s) IntraMuscular once  heparin  Infusion 950 Unit(s)/Hr IV Continuous <Continuous>  hydrALAZINE 40 milliGRAM(s) Oral every 8 hours  insulin glargine Injectable (LANTUS) 8 Unit(s) SubCutaneous at bedtime  insulin lispro (ADMELOG) corrective regimen sliding scale   SubCutaneous three times a day before meals  insulin lispro (ADMELOG) corrective regimen sliding scale   SubCutaneous at bedtime  isosorbide   dinitrate Tablet (ISORDIL) 30 milliGRAM(s) Oral three times a day  LORazepam     Tablet 0.5 milliGRAM(s) Oral every 6 hours  nitroprusside Infusion 0.5 MICROgram(s)/kG/Min IV Continuous <Continuous>  nystatin    Suspension 443707 Unit(s) Oral every 6 hours  pantoprazole    Tablet 40 milliGRAM(s) Oral before breakfast  polyethylene glycol 3350 17 Gram(s) Oral daily  sodium chloride 0.65% Nasal 1 Spray(s) Both Nostrils three times a day  spironolactone 25 milliGRAM(s) Oral daily  ticagrelor 90 milliGRAM(s) Oral every 12 hours  tiotropium 2.5 MICROgram(s) Inhaler 2 Puff(s) Inhalation daily      PAST MEDICAL & SURGICAL HISTORY:  HTN (hypertension)      CAD (coronary artery disease)  2009; stent      Intracranial hemorrhage  2008      Respiratory arrest  december 1st      Myocardial infarction, unspecified MI type, unspecified artery      History of coronary artery stent placement          Vitals:  T(F): 97.9 (11-06), Max: 100 (11-06)  HR: 91 (11-06) (85 - 100)  BP: --  RR: 19 (11-06)  SpO2: 91% (11-06)  I&O's Summary    05 Nov 2023 07:01  -  06 Nov 2023 07:00  --------------------------------------------------------  IN: 2122 mL / OUT: 3455 mL / NET: -1333 mL    06 Nov 2023 07:01  -  06 Nov 2023 14:07  --------------------------------------------------------  IN: 595.5 mL / OUT: 1020 mL / NET: -424.5 mL        Physical Exam:  Appearance: No acute distress; well appearing  Eyes: PERRL, EOMI, pink conjunctiva  HENT: Normal oral mucosa  Cardiovascular: RRR, S1, S2, no murmurs, no edema; elevated JVD to mid neck  Respiratory: Clear to auscultation bilaterally  Gastrointestinal: soft, non-tender, non-distended with normal bowel sounds  Musculoskeletal: No clubbing; no joint deformity   Neurologic: Non-focal  Lymphatic: No lymphadenopathy  Psychiatry: AAOx3, mood & affect appropriate  Skin: No rashes, ecchymoses, or cyanosis                          12.0   11.01 )-----------( 130      ( 06 Nov 2023 01:54 )             35.4     11-06    131<L>  |  100  |  20  ----------------------------<  150<H>  3.8   |  17<L>  |  1.46<H>    Ca    8.8      06 Nov 2023 10:04  Phos  2.4     11-06  Mg     2.0     11-06    TPro  6.1  /  Alb  3.0<L>  /  TBili  0.7  /  DBili  x   /  AST  65<H>  /  ALT  53<H>  /  AlkPhos  40  11-06    PTT - ( 06 Nov 2023 10:04 )  PTT:55.4 sec

## 2023-11-06 NOTE — PROGRESS NOTE ADULT - ATTENDING COMMENTS
55 yo M with known CAD and HFrEF (TTE 2018 showed low LVEF with anteroseptal akinesis) who presented with COVID infection, NSTEMI and CS.  RHC showed biventricular congestion and low CI. LHC showed occluded LAD and severe diffuse RCA disease (filling via L-R collaterals)  Improved with IABP and afterload reduction.   Attempted to wean IABP with hypotension, reduction of CI and worsening Cr, LFTs and Na in last 48h.  On 11/6: RA~13, PAd~27 and CI~3 on IABP 1:1 and Nipride @2mcg/kg/min     Cr 1.46, baseline 1.1    - Diurese with Lasix IV discussed with CCU team  - Increase Isordil Hydral, wean Nipride   - Continue Spironolactone   - Would add inotrope to allow for IABP wean   - Continue ASA and Ticagrelor  - Recovery remains dubious, will breach subject of AT work-up with patient     Erlin Quijano MD

## 2023-11-06 NOTE — CHART NOTE - NSCHARTNOTEFT_GEN_A_CORE
IABP wean     11/5 14:15 - IABP 1:1 Mv02 71.9% Lactate 1.1  11/5 22:10 - IABP 1:2 Mv02 72.5% Lactate 1.0  11/6 01:30 - IABP 1:3 Mv02 67.5% Lactate   11/6 03:00 - IABP 1:3 w/ half augmentation Mv02, Lactate IABP wean     11/5 14:15 - IABP 1:1 Mv02 71.9% Lactate 1.1  11/5 22:10 - IABP 1:2 Mv02 72.5% Lactate 1.0  11/6 01:30 - IABP 1:3 Mv02 67.5% Lactate 0.9  11/6 03:00 - IABP 1:3 w/ half augmentation Mv02, Lactate IABP wean     11/5 14:15 - IABP 1:1 Mv02 71.9% Lactate 1.1  11/5 22:10 - IABP 1:2 Mv02 72.5% Lactate 1.0  11/6 01:30 - IABP 1:3 Mv02 67.5% Lactate 0.9  11/6 03:00 - IABP 1:3 w/ half augmentation Mv02 61.6%, Lactate 1.0      Mv02 61.6% (Hgb 12) on Nipride 1.4 Hydral 30 ISD 20, IABP 1:3 half augmentation - CO/CI 4.7/2.2 Lactate 1.0 PAP 34/14 CVP 5 SVR 1313 (MAP 83).  Will titrate up on Nipride, Hydral/ISD increased.  ARIC, autodiuresing net -1.4L CVP 5-6 s/p  bolus.  Keep IABP 1:1 c/w Heparin gtt given borderline Hemos as stated above, increase Hep gtt rate

## 2023-11-06 NOTE — PROGRESS NOTE ADULT - ASSESSMENT
====================ASSESSMENT ==============    59 yo M with HTN, CAD (s/p PCI 2008), HFrEF (EF severely reduced 2018) not on GDMT, CVA 2018 (requiring tpA), DM presenting with chest pressure and unknown tachycardia that was shocked x1, LHC 11/1 found to have chronic total occlusion of LAD and RCA, with elevated RA and PA pressures, echo 11/1 w/ severely decreased EF 32%, s/p IABP 11/1, likely in cardiogenic shock 2/2 acute on chronic HF s/p unknown shockable rhythm.    ============================= NEUROLOGY =============================  -  A&Ox3    # Hx of CVA  - No neuro deficits known    # Anxiety  - Takes 0.5 ativan PRN at home  - c/w ativan 0.5 q6 hours OTCx3 days then PRN  - Wean off precedex  - Psych consult on Monday for medical management of anxiety    ============================= RESPIRATORY =============================    # AHRF 2/2 pulmonary edema requiring Intubation   - Extubated 11/3  - Wean O2 as tolerated, currently on 2L NC    # COVID +  - Maintain Airborne precautions  - No remdesivir or steroids per ID since respiratory failure not related      # Right sided opacification on CXR 11/2  - Pt. initially had cough, congestion, fever, was placed on antibiotics on Juan 10/29  - s/p levaquin (10/29-10/31)  - 11/6 CXR clear  - Watch off abx; vanc and cefepime (11/2)  - Sputum cx 11/2 NGTD    #Asthma (hx of respiratory failure in 2018- intubated for 20 minutes per chart review pt report)  - c/w albuterol qd  -c/w home trelegy     ============================= CARDIOVASCULAR =============================    # Cardiogenic shock 2/2 HF exacerbation requiring IABP (11/1-)  - 11/1 [facility]: pLAD 100 % in-stent restenosis & mRCA, 100 %. PCWP 30. +IABP  - 11/1 TTE: LV dilated. EF 32 %. Regional WMAs present, mod (grade 2) LV diastolic dysfunction  - Per echo areas of akinesis are not new compared to 2017, but EF is severely decreased   - Currently on IABP 1:1, with Means 80s, attempt wean today  - c/w nipride gtt for weaning of IABP  - c/w hydralazine and isordil for AL reduction  - Daily CXR for IABP  - Keep right leg straight while sheaths/IABP/[place] are in position     # Stent re-occlusion (pLAD) and 100% occluded RCA  -Trop 440-> 414  -EKG here w/ LBBB  -[facility] 11/1: pLAD 70 % stenosis in the ostium portion of the segment and 100 % in-stent restenosis. mRCA, 100 % stenosis. RA 23, PA 51/33, wedge 31, PA sat 54%, CI 1.5  -c/w ASA, brillinta (takes plavix at home?)  -c/w lipitor 80  -c/w heparin gtt  -CT surg not recommending CABG, instead AT eval  -viability study when IABP removed    #HFrEF w/ severely reduced EF 32%  -s/p lasix 40 11/1, lasix 80 today per HF  -start GDMT when stable  - CXR 11/6 clear   - Trend CVPs (13 this AM)  - Strict I&O  - Daily weights  - Fluid restriction  - on ozempic and dapaglifozin at home    #LV thrombus  TTE 11/2: LV thrombus, EF of 22% w/ global hypokinesis  -c/w heparin gtt    #HTN  -hold home losartan 25, HCTZ 25, coreg 25 bid for now    ============================= RENAL =============================  Baseline Cr: 1-1.22  -trend Cr    #Metabolic acidosis  Likely 2/2 lactate of 3.7, due to cardiogenic shock  Improved  -trend lactate, blood gas  -IABP     ============================= GI =============================  #Transaminitis  Likely iso cardiogenic shock/abnormal shockable event  AST/ALT: 94/50 on admission  -trend LFTs    #Diet:   -passed dysphagia screen  -will advance diet to soft and bite sized    GERD  -changed PPI to qd    #Bowel regimen:   -miralax  -senna    ============================= ENDO =============================  #Type 2 DM  - A1c 8.3  - c/w lantus and ISS    ============================= HEMATOLOGIC =============================  H/H stable    # VTE prophylaxis   - c/w heparin gtt    ============================= ID =============================  #Concern for aspiration event prior to intubation  R sided opacification on CXR 11/2, clear today 11/3  -Possibly iso aspiration event prior to intubation  -pt febrile to 100.6 today (11/4)  -repeat blood cx today 11/4  -UA 11/4 neg  -f/u CXR  -per ID, no change in plan   -monitor off vanc and cefepime (11/2)  -MRSA swab negative  -sputum cx 11/2 no growth  -blood cx 11/2 no growth      Patient requires continuous monitoring with bedside rhythm monitoring, pulse ox monitoring, and intermittent blood gas analysis. Care plan discussed with ICU care team. Patient remained critical and at risk for life threatening decompensation.  Patient seen, examined and plan discussed with CCU team during rounds.     I have personally provided 35 minutes of critical care time excluding time spent on separate procedures, in addition to initial critical care time provided by the CICU Attending, Dr. Lees.    Rita Henry, [facility]-BC ====================ASSESSMENT ==============    57 yo M with HTN, CAD (s/p PCI 2008), HFrEF (EF severely reduced 2018) not on GDMT, CVA 2018 (requiring tpA), DM presenting with chest pressure and unknown tachycardia that was shocked x1, LHC 11/1 found to have chronic total occlusion of LAD and RCA, with elevated RA and PA pressures, echo 11/1 w/ severely decreased EF 32%, s/p IABP 11/1, likely in cardiogenic shock 2/2 acute on chronic HF s/p unknown shockable rhythm.    ============================= NEUROLOGY =============================  -  A&Ox3    # Hx of CVA  - No neuro deficits known    # Anxiety  - Takes 0.5 ativan PRN at home  - c/w ativan 0.5 q6 hours ATCx3 days then PRN  - Wean off precedex  - Psych consult on Monday for medical management of anxiety    ============================= RESPIRATORY =============================    # AHRF 2/2 pulmonary edema requiring Intubation   - Extubated 11/3  - Wean O2 as tolerated, currently on 2L NC    # COVID +  - Maintain Airborne precautions  - No remdesivir or steroids per ID since respiratory failure not related to COVID    # Right sided opacification on CXR 11/2  - Pt. initially had cough, congestion, fever, was placed on antibiotics on Juan 10/29  - s/p levaquin (10/29-10/31)  - 11/6 CXR clear  - Watch off abx; vanc and cefepime (11/2)  - Sputum cx 11/2 NGTD    # Asthma (hx of respiratory failure in 2018- intubated for 20 minutes per chart review pt report)  - c/w albuterol qd  - c/w home trelegy     ============================= CARDIOVASCULAR =============================    # Cardiogenic shock 2/2 HF exacerbation requiring IABP (11/1-)  - 11/1 LHC: pLAD 100 % in-stent restenosis & mRCA, 100 %. PCWP 30. +IABP  - 11/1 TTE: LV dilated. EF 32 %. Regional WMAs present, mod (grade 2) LV diastolic dysfunction  - Per echo areas of akinesis are not new compared to 2017, but EF is severely decreased   - Currently on IABP 1:1, with means 80s, attempt wean today  - c/w nipride gtt for weaning of IABP  - c/w hydralazine and isordil for AL reduction  - Trend swan and CVP numbers  - Daily CXR for IABP  - Keep right leg straight while sheaths/IABP/[place] are in position     # Stent re-occlusion of pLAD and 100% occluded RCA  - CE cleared  - EKG here w/ LBBB  -c/w ASA, brillinta (takes plavix at home?)  -c/w lipitor 80  -c/w heparin gtt  -CT sx not recommending CABG, instead AT eval  - Viability study when IABP removed    # HFrEF w/ severely reduced EF 22%  - s/p lasix 40 11/1  - Lasix 80 today per HF (CVP 13)  - Start GDMT when stable  - CXR 11/6 clear   - Strict I&O  - Daily weights  - Fluid restriction  - On ozempic and dapaglifozin at home    # LV thrombus  - Seen on TTE 11/2  - c/w heparin gtt    # HTN  - Holding home losartan 25, HCTZ 25, coreg 25 for now    ============================= RENAL =============================  # ARIC  - Baseline Cr: 1-1.22  - Trend BMP, lytes and UOP    # Metabolic acidosis  Likely 2/2 lactate of 3.7, due to cardiogenic shock  - Resolved    ============================= GI =============================  # Transaminitis  - Likely iso cardiogenic shock  - AST/ALT: 94/50 on admission  - Improving, continue to trend    # GERD  - c/w home protonix  - Tolerating diet  - BM 11/6    # Bowel regimen  - c/w miralax and senna    ============================= ENDO =============================  # Type 2 DM  - A1c 8.3  - c/w lantus and ISS    ============================= HEMATOLOGIC =============================    # VTE prophylaxis   - H/H stable  - c/w heparin gtt    ============================= ID =============================    # Concern for aspiration event prior to intubation  - right sided opacification on CXR 11/2, clear today 11/6  - Last fever 11/4 (100.6) today  - Blood cultures 11/2 and 11/4 NGTD  - UA 11/4 negative  - MRSA swab negative  - Continue to monitor off ABx (s/p vanco and cefepime 11/2)  - ID following    Patient requires continuous monitoring with bedside rhythm monitoring, pulse ox monitoring, and intermittent blood gas analysis. Care plan discussed with ICU care team. Patient remained critical and at risk for life threatening decompensation.    Patient seen, examined and plan discussed with CCU team during rounds.     I have personally provided 35 minutes of critical care time excluding time spent on separate procedures, in addition to initial critical care time provided by the CICU Attending, Dr. Lees.    Rita Henry [facility]-BC

## 2023-11-07 LAB
ALBUMIN SERPL ELPH-MCNC: 3 G/DL — LOW (ref 3.3–5)
ALBUMIN SERPL ELPH-MCNC: 3 G/DL — LOW (ref 3.3–5)
ALBUMIN SERPL ELPH-MCNC: 3.1 G/DL — LOW (ref 3.3–5)
ALBUMIN SERPL ELPH-MCNC: 3.1 G/DL — LOW (ref 3.3–5)
ALBUMIN SERPL ELPH-MCNC: 3.2 G/DL — LOW (ref 3.3–5)
ALBUMIN SERPL ELPH-MCNC: 3.2 G/DL — LOW (ref 3.3–5)
ALP SERPL-CCNC: 41 U/L — SIGNIFICANT CHANGE UP (ref 40–120)
ALT FLD-CCNC: 44 U/L — SIGNIFICANT CHANGE UP (ref 10–45)
ALT FLD-CCNC: 44 U/L — SIGNIFICANT CHANGE UP (ref 10–45)
ALT FLD-CCNC: 47 U/L — HIGH (ref 10–45)
ALT FLD-CCNC: 47 U/L — HIGH (ref 10–45)
ALT FLD-CCNC: 50 U/L — HIGH (ref 10–45)
ALT FLD-CCNC: 50 U/L — HIGH (ref 10–45)
ANION GAP SERPL CALC-SCNC: 10 MMOL/L — SIGNIFICANT CHANGE UP (ref 5–17)
ANION GAP SERPL CALC-SCNC: 10 MMOL/L — SIGNIFICANT CHANGE UP (ref 5–17)
ANION GAP SERPL CALC-SCNC: 12 MMOL/L — SIGNIFICANT CHANGE UP (ref 5–17)
ANION GAP SERPL CALC-SCNC: 12 MMOL/L — SIGNIFICANT CHANGE UP (ref 5–17)
ANION GAP SERPL CALC-SCNC: 15 MMOL/L — SIGNIFICANT CHANGE UP (ref 5–17)
ANION GAP SERPL CALC-SCNC: 15 MMOL/L — SIGNIFICANT CHANGE UP (ref 5–17)
APTT BLD: 51.8 SEC — HIGH (ref 24.5–35.6)
APTT BLD: 51.8 SEC — HIGH (ref 24.5–35.6)
APTT BLD: 62.9 SEC — HIGH (ref 24.5–35.6)
APTT BLD: 62.9 SEC — HIGH (ref 24.5–35.6)
AST SERPL-CCNC: 40 U/L — SIGNIFICANT CHANGE UP (ref 10–40)
AST SERPL-CCNC: 40 U/L — SIGNIFICANT CHANGE UP (ref 10–40)
AST SERPL-CCNC: 47 U/L — HIGH (ref 10–40)
AST SERPL-CCNC: 47 U/L — HIGH (ref 10–40)
AST SERPL-CCNC: 53 U/L — HIGH (ref 10–40)
AST SERPL-CCNC: 53 U/L — HIGH (ref 10–40)
BASE EXCESS BLDMV CALC-SCNC: -0.1 MMOL/L — SIGNIFICANT CHANGE UP (ref -3–3)
BASE EXCESS BLDMV CALC-SCNC: -0.1 MMOL/L — SIGNIFICANT CHANGE UP (ref -3–3)
BASOPHILS # BLD AUTO: 0.01 K/UL — SIGNIFICANT CHANGE UP (ref 0–0.2)
BASOPHILS # BLD AUTO: 0.01 K/UL — SIGNIFICANT CHANGE UP (ref 0–0.2)
BASOPHILS NFR BLD AUTO: 0.1 % — SIGNIFICANT CHANGE UP (ref 0–2)
BASOPHILS NFR BLD AUTO: 0.1 % — SIGNIFICANT CHANGE UP (ref 0–2)
BILIRUB SERPL-MCNC: 0.6 MG/DL — SIGNIFICANT CHANGE UP (ref 0.2–1.2)
BILIRUB SERPL-MCNC: 0.6 MG/DL — SIGNIFICANT CHANGE UP (ref 0.2–1.2)
BILIRUB SERPL-MCNC: 0.7 MG/DL — SIGNIFICANT CHANGE UP (ref 0.2–1.2)
BILIRUB SERPL-MCNC: 0.7 MG/DL — SIGNIFICANT CHANGE UP (ref 0.2–1.2)
BILIRUB SERPL-MCNC: 0.8 MG/DL — SIGNIFICANT CHANGE UP (ref 0.2–1.2)
BILIRUB SERPL-MCNC: 0.8 MG/DL — SIGNIFICANT CHANGE UP (ref 0.2–1.2)
BUN SERPL-MCNC: 24 MG/DL — HIGH (ref 7–23)
BUN SERPL-MCNC: 24 MG/DL — HIGH (ref 7–23)
BUN SERPL-MCNC: 25 MG/DL — HIGH (ref 7–23)
BUN SERPL-MCNC: 25 MG/DL — HIGH (ref 7–23)
BUN SERPL-MCNC: 27 MG/DL — HIGH (ref 7–23)
BUN SERPL-MCNC: 27 MG/DL — HIGH (ref 7–23)
CALCIUM SERPL-MCNC: 8.7 MG/DL — SIGNIFICANT CHANGE UP (ref 8.4–10.5)
CALCIUM SERPL-MCNC: 8.7 MG/DL — SIGNIFICANT CHANGE UP (ref 8.4–10.5)
CALCIUM SERPL-MCNC: 8.9 MG/DL — SIGNIFICANT CHANGE UP (ref 8.4–10.5)
CALCIUM SERPL-MCNC: 8.9 MG/DL — SIGNIFICANT CHANGE UP (ref 8.4–10.5)
CALCIUM SERPL-MCNC: 9 MG/DL — SIGNIFICANT CHANGE UP (ref 8.4–10.5)
CALCIUM SERPL-MCNC: 9 MG/DL — SIGNIFICANT CHANGE UP (ref 8.4–10.5)
CHLORIDE SERPL-SCNC: 100 MMOL/L — SIGNIFICANT CHANGE UP (ref 96–108)
CO2 BLDMV-SCNC: 25 MMOL/L — SIGNIFICANT CHANGE UP (ref 21–29)
CO2 BLDMV-SCNC: 25 MMOL/L — SIGNIFICANT CHANGE UP (ref 21–29)
CO2 SERPL-SCNC: 18 MMOL/L — LOW (ref 22–31)
CO2 SERPL-SCNC: 18 MMOL/L — LOW (ref 22–31)
CO2 SERPL-SCNC: 19 MMOL/L — LOW (ref 22–31)
CO2 SERPL-SCNC: 19 MMOL/L — LOW (ref 22–31)
CO2 SERPL-SCNC: 20 MMOL/L — LOW (ref 22–31)
CO2 SERPL-SCNC: 20 MMOL/L — LOW (ref 22–31)
CREAT SERPL-MCNC: 1.48 MG/DL — HIGH (ref 0.5–1.3)
CREAT SERPL-MCNC: 1.48 MG/DL — HIGH (ref 0.5–1.3)
CREAT SERPL-MCNC: 1.6 MG/DL — HIGH (ref 0.5–1.3)
CREAT SERPL-MCNC: 1.6 MG/DL — HIGH (ref 0.5–1.3)
CREAT SERPL-MCNC: 1.63 MG/DL — HIGH (ref 0.5–1.3)
CREAT SERPL-MCNC: 1.63 MG/DL — HIGH (ref 0.5–1.3)
CULTURE RESULTS: SIGNIFICANT CHANGE UP
EGFR: 48 ML/MIN/1.73M2 — LOW
EGFR: 48 ML/MIN/1.73M2 — LOW
EGFR: 49 ML/MIN/1.73M2 — LOW
EGFR: 49 ML/MIN/1.73M2 — LOW
EGFR: 54 ML/MIN/1.73M2 — LOW
EGFR: 54 ML/MIN/1.73M2 — LOW
EOSINOPHIL # BLD AUTO: 0.24 K/UL — SIGNIFICANT CHANGE UP (ref 0–0.5)
EOSINOPHIL # BLD AUTO: 0.24 K/UL — SIGNIFICANT CHANGE UP (ref 0–0.5)
EOSINOPHIL NFR BLD AUTO: 2.6 % — SIGNIFICANT CHANGE UP (ref 0–6)
EOSINOPHIL NFR BLD AUTO: 2.6 % — SIGNIFICANT CHANGE UP (ref 0–6)
GAS PNL BLDA: SIGNIFICANT CHANGE UP
GAS PNL BLDMV: SIGNIFICANT CHANGE UP
GAS PNL BLDMV: SIGNIFICANT CHANGE UP
GLUCOSE BLDC GLUCOMTR-MCNC: 145 MG/DL — HIGH (ref 70–99)
GLUCOSE BLDC GLUCOMTR-MCNC: 145 MG/DL — HIGH (ref 70–99)
GLUCOSE BLDC GLUCOMTR-MCNC: 175 MG/DL — HIGH (ref 70–99)
GLUCOSE BLDC GLUCOMTR-MCNC: 175 MG/DL — HIGH (ref 70–99)
GLUCOSE BLDC GLUCOMTR-MCNC: 179 MG/DL — HIGH (ref 70–99)
GLUCOSE BLDC GLUCOMTR-MCNC: 179 MG/DL — HIGH (ref 70–99)
GLUCOSE BLDC GLUCOMTR-MCNC: 181 MG/DL — HIGH (ref 70–99)
GLUCOSE BLDC GLUCOMTR-MCNC: 181 MG/DL — HIGH (ref 70–99)
GLUCOSE SERPL-MCNC: 144 MG/DL — HIGH (ref 70–99)
GLUCOSE SERPL-MCNC: 144 MG/DL — HIGH (ref 70–99)
GLUCOSE SERPL-MCNC: 165 MG/DL — HIGH (ref 70–99)
GLUCOSE SERPL-MCNC: 165 MG/DL — HIGH (ref 70–99)
GLUCOSE SERPL-MCNC: 169 MG/DL — HIGH (ref 70–99)
GLUCOSE SERPL-MCNC: 169 MG/DL — HIGH (ref 70–99)
HCO3 BLDMV-SCNC: 24 MMOL/L — SIGNIFICANT CHANGE UP (ref 20–28)
HCO3 BLDMV-SCNC: 24 MMOL/L — SIGNIFICANT CHANGE UP (ref 20–28)
HCT VFR BLD CALC: 32.3 % — LOW (ref 39–50)
HCT VFR BLD CALC: 32.3 % — LOW (ref 39–50)
HGB BLD-MCNC: 10.9 G/DL — LOW (ref 13–17)
HGB BLD-MCNC: 10.9 G/DL — LOW (ref 13–17)
HOROWITZ INDEX BLDMV+IHG-RTO: SIGNIFICANT CHANGE UP
HOROWITZ INDEX BLDMV+IHG-RTO: SIGNIFICANT CHANGE UP
IMM GRANULOCYTES NFR BLD AUTO: 0.5 % — SIGNIFICANT CHANGE UP (ref 0–0.9)
IMM GRANULOCYTES NFR BLD AUTO: 0.5 % — SIGNIFICANT CHANGE UP (ref 0–0.9)
LACTATE BLDV-MCNC: 0.7 MMOL/L — SIGNIFICANT CHANGE UP (ref 0.5–2)
LACTATE BLDV-MCNC: 0.7 MMOL/L — SIGNIFICANT CHANGE UP (ref 0.5–2)
LYMPHOCYTES # BLD AUTO: 1.77 K/UL — SIGNIFICANT CHANGE UP (ref 1–3.3)
LYMPHOCYTES # BLD AUTO: 1.77 K/UL — SIGNIFICANT CHANGE UP (ref 1–3.3)
LYMPHOCYTES # BLD AUTO: 19.1 % — SIGNIFICANT CHANGE UP (ref 13–44)
LYMPHOCYTES # BLD AUTO: 19.1 % — SIGNIFICANT CHANGE UP (ref 13–44)
MAGNESIUM SERPL-MCNC: 2.1 MG/DL — SIGNIFICANT CHANGE UP (ref 1.6–2.6)
MAGNESIUM SERPL-MCNC: 2.2 MG/DL — SIGNIFICANT CHANGE UP (ref 1.6–2.6)
MAGNESIUM SERPL-MCNC: 2.2 MG/DL — SIGNIFICANT CHANGE UP (ref 1.6–2.6)
MCHC RBC-ENTMCNC: 28 PG — SIGNIFICANT CHANGE UP (ref 27–34)
MCHC RBC-ENTMCNC: 28 PG — SIGNIFICANT CHANGE UP (ref 27–34)
MCHC RBC-ENTMCNC: 33.7 GM/DL — SIGNIFICANT CHANGE UP (ref 32–36)
MCHC RBC-ENTMCNC: 33.7 GM/DL — SIGNIFICANT CHANGE UP (ref 32–36)
MCV RBC AUTO: 83 FL — SIGNIFICANT CHANGE UP (ref 80–100)
MCV RBC AUTO: 83 FL — SIGNIFICANT CHANGE UP (ref 80–100)
MONOCYTES # BLD AUTO: 1.06 K/UL — HIGH (ref 0–0.9)
MONOCYTES # BLD AUTO: 1.06 K/UL — HIGH (ref 0–0.9)
MONOCYTES NFR BLD AUTO: 11.4 % — SIGNIFICANT CHANGE UP (ref 2–14)
MONOCYTES NFR BLD AUTO: 11.4 % — SIGNIFICANT CHANGE UP (ref 2–14)
NEUTROPHILS # BLD AUTO: 6.14 K/UL — SIGNIFICANT CHANGE UP (ref 1.8–7.4)
NEUTROPHILS # BLD AUTO: 6.14 K/UL — SIGNIFICANT CHANGE UP (ref 1.8–7.4)
NEUTROPHILS NFR BLD AUTO: 66.3 % — SIGNIFICANT CHANGE UP (ref 43–77)
NEUTROPHILS NFR BLD AUTO: 66.3 % — SIGNIFICANT CHANGE UP (ref 43–77)
NRBC # BLD: 0 /100 WBCS — SIGNIFICANT CHANGE UP (ref 0–0)
NRBC # BLD: 0 /100 WBCS — SIGNIFICANT CHANGE UP (ref 0–0)
O2 CT VFR BLD CALC: 51 MMHG — SIGNIFICANT CHANGE UP (ref 30–65)
O2 CT VFR BLD CALC: 51 MMHG — SIGNIFICANT CHANGE UP (ref 30–65)
PCO2 BLDMV: 36 MMHG — SIGNIFICANT CHANGE UP (ref 30–65)
PCO2 BLDMV: 36 MMHG — SIGNIFICANT CHANGE UP (ref 30–65)
PH BLDMV: 7.43 — SIGNIFICANT CHANGE UP (ref 7.32–7.45)
PH BLDMV: 7.43 — SIGNIFICANT CHANGE UP (ref 7.32–7.45)
PHOSPHATE SERPL-MCNC: 3.1 MG/DL — SIGNIFICANT CHANGE UP (ref 2.5–4.5)
PHOSPHATE SERPL-MCNC: 3.1 MG/DL — SIGNIFICANT CHANGE UP (ref 2.5–4.5)
PHOSPHATE SERPL-MCNC: 3.3 MG/DL — SIGNIFICANT CHANGE UP (ref 2.5–4.5)
PHOSPHATE SERPL-MCNC: 3.3 MG/DL — SIGNIFICANT CHANGE UP (ref 2.5–4.5)
PHOSPHATE SERPL-MCNC: 3.5 MG/DL — SIGNIFICANT CHANGE UP (ref 2.5–4.5)
PHOSPHATE SERPL-MCNC: 3.5 MG/DL — SIGNIFICANT CHANGE UP (ref 2.5–4.5)
PLATELET # BLD AUTO: 172 K/UL — SIGNIFICANT CHANGE UP (ref 150–400)
PLATELET # BLD AUTO: 172 K/UL — SIGNIFICANT CHANGE UP (ref 150–400)
POTASSIUM SERPL-MCNC: 3.5 MMOL/L — SIGNIFICANT CHANGE UP (ref 3.5–5.3)
POTASSIUM SERPL-MCNC: 3.5 MMOL/L — SIGNIFICANT CHANGE UP (ref 3.5–5.3)
POTASSIUM SERPL-MCNC: 3.9 MMOL/L — SIGNIFICANT CHANGE UP (ref 3.5–5.3)
POTASSIUM SERPL-MCNC: 3.9 MMOL/L — SIGNIFICANT CHANGE UP (ref 3.5–5.3)
POTASSIUM SERPL-MCNC: 4 MMOL/L — SIGNIFICANT CHANGE UP (ref 3.5–5.3)
POTASSIUM SERPL-MCNC: 4 MMOL/L — SIGNIFICANT CHANGE UP (ref 3.5–5.3)
POTASSIUM SERPL-SCNC: 3.5 MMOL/L — SIGNIFICANT CHANGE UP (ref 3.5–5.3)
POTASSIUM SERPL-SCNC: 3.5 MMOL/L — SIGNIFICANT CHANGE UP (ref 3.5–5.3)
POTASSIUM SERPL-SCNC: 3.9 MMOL/L — SIGNIFICANT CHANGE UP (ref 3.5–5.3)
POTASSIUM SERPL-SCNC: 3.9 MMOL/L — SIGNIFICANT CHANGE UP (ref 3.5–5.3)
POTASSIUM SERPL-SCNC: 4 MMOL/L — SIGNIFICANT CHANGE UP (ref 3.5–5.3)
POTASSIUM SERPL-SCNC: 4 MMOL/L — SIGNIFICANT CHANGE UP (ref 3.5–5.3)
PROT SERPL-MCNC: 6.1 G/DL — SIGNIFICANT CHANGE UP (ref 6–8.3)
PROT SERPL-MCNC: 6.1 G/DL — SIGNIFICANT CHANGE UP (ref 6–8.3)
PROT SERPL-MCNC: 6.4 G/DL — SIGNIFICANT CHANGE UP (ref 6–8.3)
PROT SERPL-MCNC: 6.4 G/DL — SIGNIFICANT CHANGE UP (ref 6–8.3)
PROT SERPL-MCNC: 6.5 G/DL — SIGNIFICANT CHANGE UP (ref 6–8.3)
PROT SERPL-MCNC: 6.5 G/DL — SIGNIFICANT CHANGE UP (ref 6–8.3)
RBC # BLD: 3.89 M/UL — LOW (ref 4.2–5.8)
RBC # BLD: 3.89 M/UL — LOW (ref 4.2–5.8)
RBC # FLD: 14.4 % — SIGNIFICANT CHANGE UP (ref 10.3–14.5)
RBC # FLD: 14.4 % — SIGNIFICANT CHANGE UP (ref 10.3–14.5)
SAO2 % BLDMV: 77.2 — SIGNIFICANT CHANGE UP (ref 60–90)
SAO2 % BLDMV: 77.2 — SIGNIFICANT CHANGE UP (ref 60–90)
SODIUM SERPL-SCNC: 130 MMOL/L — LOW (ref 135–145)
SODIUM SERPL-SCNC: 130 MMOL/L — LOW (ref 135–145)
SODIUM SERPL-SCNC: 131 MMOL/L — LOW (ref 135–145)
SODIUM SERPL-SCNC: 131 MMOL/L — LOW (ref 135–145)
SODIUM SERPL-SCNC: 133 MMOL/L — LOW (ref 135–145)
SODIUM SERPL-SCNC: 133 MMOL/L — LOW (ref 135–145)
SPECIMEN SOURCE: SIGNIFICANT CHANGE UP
UFH PPP CHRO-ACNC: <0.04 IU/ML — LOW (ref 0.3–0.7)
UFH PPP CHRO-ACNC: <0.04 IU/ML — LOW (ref 0.3–0.7)
WBC # BLD: 9.27 K/UL — SIGNIFICANT CHANGE UP (ref 3.8–10.5)
WBC # BLD: 9.27 K/UL — SIGNIFICANT CHANGE UP (ref 3.8–10.5)
WBC # FLD AUTO: 9.27 K/UL — SIGNIFICANT CHANGE UP (ref 3.8–10.5)
WBC # FLD AUTO: 9.27 K/UL — SIGNIFICANT CHANGE UP (ref 3.8–10.5)

## 2023-11-07 PROCEDURE — 71045 X-RAY EXAM CHEST 1 VIEW: CPT | Mod: 26

## 2023-11-07 PROCEDURE — 99291 CRITICAL CARE FIRST HOUR: CPT | Mod: 25

## 2023-11-07 PROCEDURE — 99292 CRITICAL CARE ADDL 30 MIN: CPT | Mod: 25

## 2023-11-07 PROCEDURE — 99233 SBSQ HOSP IP/OBS HIGH 50: CPT

## 2023-11-07 PROCEDURE — 99232 SBSQ HOSP IP/OBS MODERATE 35: CPT

## 2023-11-07 PROCEDURE — 33968 REMOVE AORTIC ASSIST DEVICE: CPT

## 2023-11-07 RX ORDER — SODIUM NITROPRUSSIDE 50 MG/2ML
0.5 INJECTION INTRAVENOUS
Qty: 100 | Refills: 0 | Status: DISCONTINUED | OUTPATIENT
Start: 2023-11-07 | End: 2023-11-08

## 2023-11-07 RX ORDER — POTASSIUM CHLORIDE 20 MEQ
40 PACKET (EA) ORAL
Refills: 0 | Status: COMPLETED | OUTPATIENT
Start: 2023-11-07 | End: 2023-11-07

## 2023-11-07 RX ORDER — HEPARIN SODIUM 5000 [USP'U]/ML
1600 INJECTION INTRAVENOUS; SUBCUTANEOUS
Qty: 25000 | Refills: 0 | Status: DISCONTINUED | OUTPATIENT
Start: 2023-11-07 | End: 2023-11-28

## 2023-11-07 RX ORDER — HYDRALAZINE HCL 50 MG
25 TABLET ORAL ONCE
Refills: 0 | Status: COMPLETED | OUTPATIENT
Start: 2023-11-07 | End: 2023-11-07

## 2023-11-07 RX ORDER — HYDRALAZINE HCL 50 MG
75 TABLET ORAL EVERY 8 HOURS
Refills: 0 | Status: DISCONTINUED | OUTPATIENT
Start: 2023-11-07 | End: 2023-11-08

## 2023-11-07 RX ORDER — FENTANYL CITRATE 50 UG/ML
50 INJECTION INTRAVENOUS ONCE
Refills: 0 | Status: DISCONTINUED | OUTPATIENT
Start: 2023-11-07 | End: 2023-11-07

## 2023-11-07 RX ADMIN — Medication 25 MILLIGRAM(S): at 06:58

## 2023-11-07 RX ADMIN — Medication 1 SPRAY(S): at 05:40

## 2023-11-07 RX ADMIN — Medication 40 MILLIGRAM(S): at 05:38

## 2023-11-07 RX ADMIN — Medication 81 MILLIGRAM(S): at 10:41

## 2023-11-07 RX ADMIN — Medication 2: at 17:35

## 2023-11-07 RX ADMIN — Medication 500000 UNIT(S): at 10:42

## 2023-11-07 RX ADMIN — TICAGRELOR 90 MILLIGRAM(S): 90 TABLET ORAL at 17:37

## 2023-11-07 RX ADMIN — Medication 500000 UNIT(S): at 05:39

## 2023-11-07 RX ADMIN — HEPARIN SODIUM 16 UNIT(S)/HR: 5000 INJECTION INTRAVENOUS; SUBCUTANEOUS at 21:01

## 2023-11-07 RX ADMIN — FENTANYL CITRATE 50 MICROGRAM(S): 50 INJECTION INTRAVENOUS at 10:11

## 2023-11-07 RX ADMIN — BUDESONIDE AND FORMOTEROL FUMARATE DIHYDRATE 2 PUFF(S): 160; 4.5 AEROSOL RESPIRATORY (INHALATION) at 09:07

## 2023-11-07 RX ADMIN — DEXMEDETOMIDINE HYDROCHLORIDE IN 0.9% SODIUM CHLORIDE 12.4 MICROGRAM(S)/KG/HR: 4 INJECTION INTRAVENOUS at 17:37

## 2023-11-07 RX ADMIN — INSULIN GLARGINE 8 UNIT(S): 100 INJECTION, SOLUTION SUBCUTANEOUS at 21:00

## 2023-11-07 RX ADMIN — DEXMEDETOMIDINE HYDROCHLORIDE IN 0.9% SODIUM CHLORIDE 12.4 MICROGRAM(S)/KG/HR: 4 INJECTION INTRAVENOUS at 10:30

## 2023-11-07 RX ADMIN — Medication 75 MILLIGRAM(S): at 14:24

## 2023-11-07 RX ADMIN — Medication 500000 UNIT(S): at 00:21

## 2023-11-07 RX ADMIN — DEXMEDETOMIDINE HYDROCHLORIDE IN 0.9% SODIUM CHLORIDE 12.4 MICROGRAM(S)/KG/HR: 4 INJECTION INTRAVENOUS at 21:01

## 2023-11-07 RX ADMIN — DEXMEDETOMIDINE HYDROCHLORIDE IN 0.9% SODIUM CHLORIDE 12.4 MICROGRAM(S)/KG/HR: 4 INJECTION INTRAVENOUS at 05:39

## 2023-11-07 RX ADMIN — Medication 0.5 MILLIGRAM(S): at 10:41

## 2023-11-07 RX ADMIN — Medication 80 MILLIGRAM(S): at 10:40

## 2023-11-07 RX ADMIN — Medication 0.5 MILLIGRAM(S): at 17:36

## 2023-11-07 RX ADMIN — Medication 0.5 MILLIGRAM(S): at 23:59

## 2023-11-07 RX ADMIN — Medication 0.5 MILLIGRAM(S): at 00:20

## 2023-11-07 RX ADMIN — SODIUM NITROPRUSSIDE 7.42 MICROGRAM(S)/KG/MIN: 50 INJECTION INTRAVENOUS at 21:02

## 2023-11-07 RX ADMIN — SPIRONOLACTONE 25 MILLIGRAM(S): 25 TABLET, FILM COATED ORAL at 05:37

## 2023-11-07 RX ADMIN — Medication 0.5 MILLIGRAM(S): at 05:39

## 2023-11-07 RX ADMIN — Medication 75 MILLIGRAM(S): at 21:00

## 2023-11-07 RX ADMIN — PANTOPRAZOLE SODIUM 40 MILLIGRAM(S): 20 TABLET, DELAYED RELEASE ORAL at 08:34

## 2023-11-07 RX ADMIN — BUDESONIDE AND FORMOTEROL FUMARATE DIHYDRATE 2 PUFF(S): 160; 4.5 AEROSOL RESPIRATORY (INHALATION) at 20:55

## 2023-11-07 RX ADMIN — ISOSORBIDE DINITRATE 30 MILLIGRAM(S): 5 TABLET ORAL at 05:37

## 2023-11-07 RX ADMIN — ATORVASTATIN CALCIUM 80 MILLIGRAM(S): 80 TABLET, FILM COATED ORAL at 21:00

## 2023-11-07 RX ADMIN — Medication 500000 UNIT(S): at 17:37

## 2023-11-07 RX ADMIN — Medication 40 MILLIEQUIVALENT(S): at 05:39

## 2023-11-07 RX ADMIN — HEPARIN SODIUM 17 UNIT(S)/HR: 5000 INJECTION INTRAVENOUS; SUBCUTANEOUS at 22:28

## 2023-11-07 RX ADMIN — ALBUTEROL 2.5 MILLIGRAM(S): 90 AEROSOL, METERED ORAL at 14:40

## 2023-11-07 RX ADMIN — Medication 40 MILLIEQUIVALENT(S): at 02:29

## 2023-11-07 RX ADMIN — Medication 1 SPRAY(S): at 21:01

## 2023-11-07 RX ADMIN — CHLORHEXIDINE GLUCONATE 1 APPLICATION(S): 213 SOLUTION TOPICAL at 21:01

## 2023-11-07 RX ADMIN — TICAGRELOR 90 MILLIGRAM(S): 90 TABLET ORAL at 05:38

## 2023-11-07 RX ADMIN — HEPARIN SODIUM 16 UNIT(S)/HR: 5000 INJECTION INTRAVENOUS; SUBCUTANEOUS at 17:37

## 2023-11-07 RX ADMIN — Medication 2: at 08:46

## 2023-11-07 RX ADMIN — ISOSORBIDE DINITRATE 30 MILLIGRAM(S): 5 TABLET ORAL at 17:36

## 2023-11-07 RX ADMIN — ISOSORBIDE DINITRATE 30 MILLIGRAM(S): 5 TABLET ORAL at 10:41

## 2023-11-07 NOTE — PROGRESS NOTE ADULT - PROBLEM SELECTOR PLAN 1
- Currently on IABP 1:1. Re-attempt wean when euvolemic.   - Inotrope: Low threshold after removing IABP   - Cont hydral 75/ ISDN 30 TID   - Aldactone 25mg daily  - Cont Lasix 80mg IV.  CVP goal <10  - Trend perfusion markers daily, MVO2, BMP, Lactate and LFT's  - Strict I/O's.

## 2023-11-07 NOTE — PROGRESS NOTE ADULT - SUBJECTIVE AND OBJECTIVE BOX
[facility]  Division of Infectious Diseases  [phone]    Name: LD VALENCIA  Age: 58y  Gender: Male  MRN: [id number]    Interval History--  Notes reviewed.     Past Medical History--  HTN (hypertension)    CAD (coronary artery disease)    Intracranial hemorrhage    Respiratory arrest    Myocardial infarction, unspecified MI type, unspecified artery    History of coronary artery stent placement        For details regarding the patient's social history, family history, and other miscellaneous elements, please refer the initial infectious diseases consultation and/or the admitting history and physical examination for this admission.    Allergies    penicillins (Unknown)    Intolerances        Medications--  Antibiotics:  nystatin    Suspension 080439 Unit(s) Oral every 6 hours    Immunologic:    Other:  albuterol    0.083%  aspirin  chewable  atorvastatin  budesonide  80 MICROgram(s)/formoterol 4.5 MICROgram(s) Inhaler  chlorhexidine 2% Cloths  dexMEDEtomidine Infusion  dextrose 50% Injectable  dextrose 50% Injectable  dextrose 50% Injectable  dextrose Oral Gel PRN  fentaNYL    Injectable  furosemide   Injectable  glucagon  Injectable  hydrALAZINE  insulin glargine Injectable (LANTUS)  insulin lispro (ADMELOG) corrective regimen sliding scale  insulin lispro (ADMELOG) corrective regimen sliding scale  isosorbide   dinitrate Tablet (ISORDIL)  LORazepam     Tablet  nitroprusside Infusion  pantoprazole    Tablet  sodium chloride 0.65% Nasal  spironolactone  ticagrelor  tiotropium 2.5 MICROgram(s) Inhaler      Review of Systems--  A 10-point review of systems was obtained.     Pertinent positives and negatives--  Constitutional: No fevers. No Chills. No Rigors.   Cardiovascular: No chest pain. No palpitations.  Respiratory: No shortness of breath. No cough.  Gastrointestinal: No nausea or vomiting. No diarrhea or constipation.   Psychiatric: Pleasant. Appropriate affect.    Review of systems otherwise negative except as previously noted.    Physical Examination--  Vital Signs: T(F): 99.1 (11-07-23 @ 07:00), Max: 99.1 (11-07-23 @ 07:00)  HR: 86 (11-07-23 @ 10:00)  BP: --  RR: 21 (11-07-23 @ 09:00)  SpO2: 95% (11-07-23 @ 10:00)  Wt(kg): --  General: Nontoxic-appearing Male in no acute distress.  HEENT: AT/NC. PERRL. EOMI. Anicteric. Conjunctiva pink and moist. Oropharynx clear. Dentition fair.  Neck: Not rigid. No sense of mass.  Nodes: None palpable.  Lungs: Clear bilaterally without rales, wheezing or rhonchi  Heart: Regular rate and rhythm. No Murmur. No rub. No gallop. No palpable thrill.  Abdomen: Bowel sounds present and normoactive. Soft. Nondistended. Nontender. No sense of mass. No organomegaly.  Back: No spinal tenderness. No costovertebral angle tenderness.   Extremities: No cyanosis or clubbing. No edema.   Skin: Warm. Dry. Good turgor. No rash. No vasculitic stigmata.  Psychiatric: Appropriate affect and mood for situation.         Laboratory Studies--  CBC                        10.9   9.27  )-----------( 172      ( 07 Nov 2023 00:46 )             32.3       Chemistries  11-07    131<L>  |  100  |  24<H>  ----------------------------<  144<H>  3.5   |  19<L>  |  1.48<H>    Ca    8.7      07 Nov 2023 00:46  Phos  3.3     11-07  Mg     2.1     11-07    TPro  6.1  /  Alb  3.0<L>  /  TBili  0.6  /  DBili  x   /  AST  53<H>  /  ALT  50<H>  /  AlkPhos  41  11-07      Culture Data    Culture - Blood (collected 04 Nov 2023 10:40)  Source: .Blood Blood-Peripheral  Preliminary Report (06 Nov 2023 14:02):    No growth at 48 Hours    Culture - Blood (collected 04 Nov 2023 10:40)  Source: .Blood Blood-Peripheral  Preliminary Report (06 Nov 2023 14:02):    No growth at 48 Hours    Culture - Sputum (collected 02 Nov 2023 15:17)  Source: ET Tube ET Tube  Gram Stain (03 Nov 2023 06:50):    Rare polymorphonuclear leukocytes per low power field    No Squamous epithelial cells per low power field    No organisms seen per oil power field  Final Report (04 Nov 2023 14:31):    No growth to date.    Culture - Blood (collected 02 Nov 2023 14:46)  Source: .Blood Blood  Preliminary Report (06 Nov 2023 19:01):    No growth at 4 days    Culture - Blood (collected 02 Nov 2023 14:34)  Source: .Blood Blood  Preliminary Report (06 Nov 2023 19:01):    No growth at 4 days             [facility]  Division of Infectious Diseases  [phone]    Name: LD VALENCIA  Age: 58y  Gender: Male  MRN: [id number]    Interval History--  Notes reviewed. Seen earlier this am. IABP to be removed. Patient on RA, SaO2 95%.    Past Medical History--  HTN (hypertension)    CAD (coronary artery disease)    Intracranial hemorrhage    Respiratory arrest    Myocardial infarction, unspecified MI type, unspecified artery    History of coronary artery stent placement        For details regarding the patient's social history, family history, and other miscellaneous elements, please refer the initial infectious diseases consultation and/or the admitting history and physical examination for this admission.    Allergies    penicillins (Unknown)    Intolerances        Medications--  Antibiotics:  nystatin    Suspension 835409 Unit(s) Oral every 6 hours    Immunologic:    Other:  albuterol    0.083%  aspirin  chewable  atorvastatin  budesonide  80 MICROgram(s)/formoterol 4.5 MICROgram(s) Inhaler  chlorhexidine 2% Cloths  dexMEDEtomidine Infusion  dextrose 50% Injectable  dextrose 50% Injectable  dextrose 50% Injectable  dextrose Oral Gel PRN  fentaNYL    Injectable  furosemide   Injectable  glucagon  Injectable  hydrALAZINE  insulin glargine Injectable (LANTUS)  insulin lispro (ADMELOG) corrective regimen sliding scale  insulin lispro (ADMELOG) corrective regimen sliding scale  isosorbide   dinitrate Tablet (ISORDIL)  LORazepam     Tablet  nitroprusside Infusion  pantoprazole    Tablet  sodium chloride 0.65% Nasal  spironolactone  ticagrelor  tiotropium 2.5 MICROgram(s) Inhaler      Review of Systems--  A 10-point review of systems was obtained.   Review of systems otherwise unchanged compared to prior visit except as previously noted.    Physical Examination--  Vital Signs: T(F): 99.1 (11-07-23 @ 07:00), Max: 99.1 (11-07-23 @ 07:00)  HR: 86 (11-07-23 @ 10:00)  BP: --  RR: 21 (11-07-23 @ 09:00)  SpO2: 95% (11-07-23 @ 10:00)  Wt(kg): --  General: Nontoxic-appearing Male in no acute distress.  HEENT: AT/NC. OETT. Anicteric. Conjunctiva pink and moist. [place] grossly clear   Neck: Not rigid. No sense of mass.  Nodes: None palpable.  Lungs: Diminished BS no RWR  Heart: RRR  Abdomen: Bowel sounds present and normoactive. Soft. Nondistended. Nontender.  Back: Unable  Extremities: No cyanosis or clubbing. 1+ edema. IABP SGC R groin  Skin: Warm. Dry. Good turgor. No rash. No vasculitic stigmata.  Psychiatric: Calmer though still asking about his anxiety meds. Mood and affect congruent.         Laboratory Studies--  CBC                        10.9   9.27  )-----------( 172      ( 07 Nov 2023 00:46 )             32.3       Chemistries  11-07    131<L>  |  100  |  24<H>  ----------------------------<  144<H>  3.5   |  19<L>  |  1.48<H>    Ca    8.7      07 Nov 2023 00:46  Phos  3.3     11-07  Mg     2.1     11-07    TPro  6.1  /  Alb  3.0<L>  /  TBili  0.6  /  DBili  x   /  AST  53<H>  /  ALT  50<H>  /  AlkPhos  41  11-07      Culture Data    Culture - Blood (collected 04 Nov 2023 10:40)  Source: .Blood Blood-Peripheral  Preliminary Report (06 Nov 2023 14:02):    No growth at 48 Hours    Culture - Blood (collected 04 Nov 2023 10:40)  Source: .Blood Blood-Peripheral  Preliminary Report (06 Nov 2023 14:02):    No growth at 48 Hours    Culture - Sputum (collected 02 Nov 2023 15:17)  Source: ET Tube ET Tube  Gram Stain (03 Nov 2023 06:50):    Rare polymorphonuclear leukocytes per low power field    No Squamous epithelial cells per low power field    No organisms seen per oil power field  Final Report (04 Nov 2023 14:31):    No growth to date.    Culture - Blood (collected 02 Nov 2023 14:46)  Source: .Blood Blood  Preliminary Report (06 Nov 2023 19:01):    No growth at 4 days    Culture - Blood (collected 02 Nov 2023 14:34)  Source: .Blood Blood  Preliminary Report (06 Nov 2023 19:01):    No growth at 4 days

## 2023-11-07 NOTE — PROGRESS NOTE ADULT - CRITICAL CARE ATTENDING COMMENT
History of CAD s/p PCI  Admitted with cardiogenic shock and respiratory failure in the setting of stent restenosis  A+Ox3, requiring Ativan and Precedex due to anxiety - wean Precedex as able  Cardiogenic shock requiring IABP with Nipride and Bidil for afterload reduction  SvO2 70s with IABP weaning overnight - remove IABP  Continue DAPT with ASA/Ticagrelor  O2 sats mid 90s on room air  DASH/diabetic diet  Non-oliguric ARIC, CVP is elevated - continue diuresis with IV Lasix to target 1-2L overall negative   H/H low but acceptable on Heparin drip for IABP  Afebrile, no antibiotics, +COVID - defer Remdesivir per ID   Sugars controlled on Lantus  IABP and [place] and rangel 11/1 - remove IABP and [place]

## 2023-11-07 NOTE — PROGRESS NOTE ADULT - SUBJECTIVE AND OBJECTIVE BOX
PATIENT:  LD VALENCIA  [id number]    CHIEF COMPLAINT:  Patient is a 58y old male who presents with a chief complaint of CP and SOB (06 Nov 2023 12:47)      INTERVAL HISTORY/OVERNIGHT EVENTS:      REVIEW OF SYSTEMS:    Constitutional:     [ ] negative [ ] fevers [ ] chills [ ] weight loss [ ] weight gain  HEENT:                  [ ] negative [ ] dry eyes [ ] eye irritation [ ] postnasal drip [ ] nasal congestion  CV:                         [ ] negative  [ ] chest pain [ ] orthopnea [ ] palpitations [ ] murmur  Resp:                     [ ] negative [ ] cough [ ] shortness of breath [ ] dyspnea [ ] wheezing [ ] sputum [ ] hemoptysis  GI:                          [ ] negative [ ] nausea [ ] vomiting [ ] diarrhea [ ] constipation [ ] abd pain [ ] dysphagia   :                        [ ] negative [ ] dysuria [ ] nocturia [ ] hematuria [ ] increased urinary frequency  Musculoskeletal: [ ] negative [ ] back pain [ ] myalgias [ ] arthralgias [ ] fracture  Skin:                       [ ] negative [ ] rash [ ] itch  Neurological:        [ ] negative [ ] headache [ ] dizziness [ ] syncope [ ] weakness [ ] numbness  Psychiatric:           [ ] negative [ ] anxiety [ ] depression  Endocrine:            [ ] negative [ ] diabetes [ ] thyroid problem  Heme/Lymph:      [ ] negative [ ] anemia [ ] bleeding problem  Allergic/Immune: [ ] negative [ ] itchy eyes [ ] nasal discharge [ ] hives [ ] angioedema    [ ] All other systems negative    MEDICATIONS:  MEDICATIONS  (STANDING):  albuterol    0.083% 2.5 milliGRAM(s) Nebulizer daily  aspirin  chewable 81 milliGRAM(s) Oral daily  atorvastatin 80 milliGRAM(s) Oral at bedtime  budesonide  80 MICROgram(s)/formoterol 4.5 MICROgram(s) Inhaler 2 Puff(s) Inhalation two times a day  chlorhexidine 2% Cloths 1 Application(s) Topical daily  dexMEDEtomidine Infusion 0.5 MICROgram(s)/kG/Hr (12.4 mL/Hr) IV Continuous <Continuous>  dextrose 50% Injectable 25 Gram(s) IV Push once  dextrose 50% Injectable 12.5 Gram(s) IV Push once  dextrose 50% Injectable 25 Gram(s) IV Push once  furosemide   Injectable 80 milliGRAM(s) IV Push daily  glucagon  Injectable 1 milliGRAM(s) IntraMuscular once  heparin  Infusion 950 Unit(s)/Hr (16 mL/Hr) IV Continuous <Continuous>  hydrALAZINE 40 milliGRAM(s) Oral every 8 hours  insulin glargine Injectable (LANTUS) 8 Unit(s) SubCutaneous at bedtime  insulin lispro (ADMELOG) corrective regimen sliding scale   SubCutaneous three times a day before meals  insulin lispro (ADMELOG) corrective regimen sliding scale   SubCutaneous at bedtime  isosorbide   dinitrate Tablet (ISORDIL) 30 milliGRAM(s) Oral three times a day  LORazepam     Tablet 0.5 milliGRAM(s) Oral every 6 hours  nitroprusside Infusion 0.5 MICROgram(s)/kG/Min (7.42 mL/Hr) IV Continuous <Continuous>  nystatin    Suspension 086660 Unit(s) Oral every 6 hours  pantoprazole    Tablet 40 milliGRAM(s) Oral before breakfast  sodium chloride 0.65% Nasal 1 Spray(s) Both Nostrils three times a day  spironolactone 25 milliGRAM(s) Oral daily  ticagrelor 90 milliGRAM(s) Oral every 12 hours  tiotropium 2.5 MICROgram(s) Inhaler 2 Puff(s) Inhalation daily    MEDICATIONS  (PRN):  dextrose Oral Gel 15 Gram(s) Oral once PRN Blood Glucose LESS THAN 70 milliGRAM(s)/deciliter      ALLERGIES:  penicillins (Unknown)    OBJECTIVE:  ICU Vital Signs Last 24 Hrs  T(C): 37 (06 Nov 2023 20:00), Max: 37 (06 Nov 2023 15:30)  T(F): 98.6 (06 Nov 2023 20:00), Max: 98.6 (06 Nov 2023 15:30)  HR: 93 (07 Nov 2023 06:00) (80 - 100)  ABP: 114/68 (07 Nov 2023 06:00) (80/66 - 132/80)  ABP(mean): 90 (07 Nov 2023 06:00) (59 - 103)  RR: 14 (07 Nov 2023 06:00) (13 - 34)  SpO2: 95% (07 Nov 2023 06:00) (88% - 96%)    O2 Parameters below as of 07 Nov 2023 02:00  Patient On (Oxygen Delivery Method): nasal cannula  O2 Flow (L/min): 4          Adult Advanced Hemodynamics Last 24 Hrs  CVP(mm Hg): 15 (07 Nov 2023 06:00) (9 - 39)  CVP(cm H2O): --  PA: 38/24 (07 Nov 2023 06:00) (22/2 - 215/204)  PA(mean): 30 (07 Nov 2023 06:00) (11 - 207)    CAPILLARY BLOOD GLUCOSE  POCT Blood Glucose.: 145 mg/dL (06 Nov 2023 21:28)  POCT Blood Glucose.: 194 mg/dL (06 Nov 2023 15:28)  POCT Blood Glucose.: 183 mg/dL (06 Nov 2023 12:30)  POCT Blood Glucose.: 173 mg/dL (06 Nov 2023 08:48)    I&O's Summary    05 Nov 2023 07:01  -  06 Nov 2023 07:00  --------------------------------------------------------  IN: 2122 mL / OUT: 3455 mL / NET: -1333 mL    06 Nov 2023 07:01  -  07 Nov 2023 06:25  --------------------------------------------------------  IN: 1961.2 mL / OUT: 3650 mL / NET: -1688.8 mL      PHYSICAL EXAMINATION:  General: WN/WD NAD  HEENT: PERRLA, EOMI, moist mucous membranes  Neurology: A&Ox3, nonfocal, MOISE x 4  Respiratory: CTA B/L, normal respiratory effort, no wheezes, crackles, rales  CV: RRR, S1S2, no murmurs, rubs or gallops  Abdominal: Soft, NT, ND +BS, Last BM  Extremities: No edema, + peripheral pulses      LABS:  ABG - ( 07 Nov 2023 00:37 )  pH, Arterial: 7.44  pH, Blood: x     /  pCO2: 33    /  pO2: 158   / HCO3: 22    / Base Excess: -1.2  /  SaO2: 99.4                            10.9   9.27  )-----------( 172      ( 07 Nov 2023 00:46 )             32.3     11-07    131<L>  |  100  |  24<H>  ----------------------------<  144<H>  3.5   |  19<L>  |  1.48<H>    Ca    8.7      07 Nov 2023 00:46  Phos  3.3     11-07  Mg     2.1     11-07    TPro  6.1  /  Alb  3.0<L>  /  TBili  0.6  /  DBili  x   /  AST  53<H>  /  ALT  50<H>  /  AlkPhos  41  11-07    LIVER FUNCTIONS - ( 07 Nov 2023 00:46 )  Alb: 3.0 g/dL / Pro: 6.1 g/dL / ALK PHOS: 41 U/L / ALT: 50 U/L / AST: 53 U/L / GGT: x           PTT - ( 07 Nov 2023 00:46 )  PTT:62.9 sec    Culture - Blood (collected 04 Nov 2023 10:40)  Source: .Blood Blood-Peripheral  Preliminary Report (06 Nov 2023 14:02):    No growth at 48 Hours    Culture - Blood (collected 04 Nov 2023 10:40)  Source: .Blood Blood-Peripheral  Preliminary Report (06 Nov 2023 14:02):    No growth at 48 Hours    < from: TTE Limited W or WO Ultrasound Enhancing Agent (11.02.23 @ 07:41) >  CONCLUSIONS:      1. After obtaining consent, Definity ultrasound enhancing agent was given for enhanced left ventricular opacification and improved delineation of the left ventricular endocardial borders. Left ventricular systolic function is severely decreased with a calculated ejection fraction of 22 % by the Chinchilla's biplane method of disks. There is a left ventricular thrombus.   2. Findings were discussed with Litzy BOSS on 11/2/2023 at 8.49am.   3. There is a left ventricular thrombus.    < end of copied text >   PATIENT:  LD VALENCIA  [id number]    CHIEF COMPLAINT:  Patient is a 58y old male who presents with a chief complaint of CP and SOB (06 Nov 2023 12:47)      INTERVAL HISTORY/OVERNIGHT EVENTS:  - Hydralazine increased, nipride decreased  - Successful wean on IABP    REVIEW OF SYSTEMS:    Constitutional:     [ ] negative [ ] fevers [ ] chills [ ] weight loss [ ] weight gain  HEENT:                  [ ] negative [ ] dry eyes [ ] eye irritation [ ] postnasal drip [ ] nasal congestion  CV:                         [ ] negative  [ ] chest pain [ ] orthopnea [ ] palpitations [ ] murmur  Resp:                     [ ] negative [ ] cough [ ] shortness of breath [ ] dyspnea [ ] wheezing [ ] sputum [ ] hemoptysis  GI:                          [ ] negative [ ] nausea [ ] vomiting [ ] diarrhea [ ] constipation [ ] abd pain [ ] dysphagia   :                        [ ] negative [ ] dysuria [ ] nocturia [ ] hematuria [ ] increased urinary frequency  Musculoskeletal: [ ] negative [ ] back pain [ ] myalgias [ ] arthralgias [ ] fracture  Skin:                       [ ] negative [ ] rash [ ] itch  Neurological:        [ ] negative [ ] headache [ ] dizziness [ ] syncope [ ] weakness [ ] numbness  Psychiatric:           [ ] negative [ ] anxiety [ ] depression  Endocrine:            [ ] negative [ ] diabetes [ ] thyroid problem  Heme/Lymph:      [ ] negative [ ] anemia [ ] bleeding problem  Allergic/Immune: [ ] negative [ ] itchy eyes [ ] nasal discharge [ ] hives [ ] angioedema    [ ] All other systems negative    MEDICATIONS:  MEDICATIONS  (STANDING):  albuterol    0.083% 2.5 milliGRAM(s) Nebulizer daily  aspirin  chewable 81 milliGRAM(s) Oral daily  atorvastatin 80 milliGRAM(s) Oral at bedtime  budesonide  80 MICROgram(s)/formoterol 4.5 MICROgram(s) Inhaler 2 Puff(s) Inhalation two times a day  chlorhexidine 2% Cloths 1 Application(s) Topical daily  dexMEDEtomidine Infusion 0.5 MICROgram(s)/kG/Hr (12.4 mL/Hr) IV Continuous <Continuous>  dextrose 50% Injectable 25 Gram(s) IV Push once  dextrose 50% Injectable 12.5 Gram(s) IV Push once  dextrose 50% Injectable 25 Gram(s) IV Push once  furosemide   Injectable 80 milliGRAM(s) IV Push daily  glucagon  Injectable 1 milliGRAM(s) IntraMuscular once  heparin  Infusion 950 Unit(s)/Hr (16 mL/Hr) IV Continuous <Continuous>  hydrALAZINE 40 milliGRAM(s) Oral every 8 hours  insulin glargine Injectable (LANTUS) 8 Unit(s) SubCutaneous at bedtime  insulin lispro (ADMELOG) corrective regimen sliding scale   SubCutaneous three times a day before meals  insulin lispro (ADMELOG) corrective regimen sliding scale   SubCutaneous at bedtime  isosorbide   dinitrate Tablet (ISORDIL) 30 milliGRAM(s) Oral three times a day  LORazepam     Tablet 0.5 milliGRAM(s) Oral every 6 hours  nitroprusside Infusion 0.5 MICROgram(s)/kG/Min (7.42 mL/Hr) IV Continuous <Continuous>  nystatin    Suspension [id number] Unit(s) Oral every 6 hours  pantoprazole    Tablet 40 milliGRAM(s) Oral before breakfast  sodium chloride 0.65% Nasal 1 Spray(s) Both Nostrils three times a day  spironolactone 25 milliGRAM(s) Oral daily  ticagrelor 90 milliGRAM(s) Oral every 12 hours  tiotropium 2.5 MICROgram(s) Inhaler 2 Puff(s) Inhalation daily    MEDICATIONS  (PRN):  dextrose Oral Gel 15 Gram(s) Oral once PRN Blood Glucose LESS THAN 70 milliGRAM(s)/deciliter      ALLERGIES:  penicillins (Unknown)    OBJECTIVE:  ICU Vital Signs Last 24 Hrs  T(C): 37 (06 Nov 2023 20:00), Max: 37 (06 Nov 2023 15:30)  T(F): 98.6 (06 Nov 2023 20:00), Max: 98.6 (06 Nov 2023 15:30)  HR: 93 (07 Nov 2023 06:00) (80 - 100)  ABP: 114/68 (07 Nov 2023 06:00) (80/66 - 132/80)  ABP(mean): 90 (07 Nov 2023 06:00) (59 - 103)  RR: 14 (07 Nov 2023 06:00) (13 - 34)  SpO2: 95% (07 Nov 2023 06:00) (88% - 96%)    O2 Parameters below as of 07 Nov 2023 02:00  Patient On (Oxygen Delivery Method): nasal cannula  O2 Flow (L/min): 4    Adult Advanced Hemodynamics Last 24 Hrs  CVP(mm Hg): 15 (07 Nov 2023 06:00) (9 - 39)  CVP(cm H2O): --  PA: 38/24 (07 Nov 2023 06:00) (22/2 - 215/204)  PA(mean): 30 (07 Nov 2023 06:00) (11 - 207)    CAPILLARY BLOOD GLUCOSE  POCT Blood Glucose.: 145 mg/dL (06 Nov 2023 21:28)  POCT Blood Glucose.: 194 mg/dL (06 Nov 2023 15:28)  POCT Blood Glucose.: 183 mg/dL (06 Nov 2023 12:30)  POCT Blood Glucose.: 173 mg/dL (06 Nov 2023 08:48)    I&O's Summary    05 Nov 2023 07:01  -  06 Nov 2023 07:00  --------------------------------------------------------  IN: 2122 mL / OUT: 3455 mL / NET: -1333 mL    06 Nov 2023 07:01  -  07 Nov 2023 06:25  --------------------------------------------------------  IN: 1961.2 mL / OUT: 3650 mL / NET: -1688.8 mL      PHYSICAL EXAMINATION:  General: WN/WD NAD  HEENT: PERRLA, EOMI, moist mucous membranes  Neurology: A&Ox3, nonfocal, MOISE x 4  Respiratory: CTA B/L, normal respiratory effort, no wheezes, crackles, rales  CV: RRR, S1S2, no murmurs, rubs or gallops  Abdominal: Soft, NT, ND +BS, Last BM  Extremities: No edema, + peripheral pulses      LABS:  ABG - ( 07 Nov 2023 00:37 )  pH, Arterial: 7.44  pH, Blood: x     /  pCO2: 33    /  pO2: 158   / HCO3: 22    / Base Excess: -1.2  /  SaO2: 99.4                            10.9   9.27  )-----------( 172      ( 07 Nov 2023 00:46 )             32.3     11-07    131<L>  |  100  |  24<H>  ----------------------------<  144<H>  3.5   |  19<L>  |  1.48<H>    Ca    8.7      07 Nov 2023 00:46  Phos  3.3     11-07  Mg     2.1     11-07    TPro  6.1  /  Alb  3.0<L>  /  TBili  0.6  /  DBili  x   /  AST  53<H>  /  ALT  50<H>  /  AlkPhos  41  11-07    LIVER FUNCTIONS - ( 07 Nov 2023 00:46 )  Alb: 3.0 g/dL / Pro: 6.1 g/dL / ALK PHOS: 41 U/L / ALT: 50 U/L / AST: 53 U/L / GGT: x           PTT - ( 07 Nov 2023 00:46 )  PTT:62.9 sec    Culture - Blood (collected 04 Nov 2023 10:40)  Source: .Blood Blood-Peripheral  Preliminary Report (06 Nov 2023 14:02):    No growth at 48 Hours    Culture - Blood (collected 04 Nov 2023 10:40)  Source: .Blood Blood-Peripheral  Preliminary Report (06 Nov 2023 14:02):    No growth at 48 Hours    < from: TTE Limited W or WO Ultrasound Enhancing Agent (11.02.23 @ 07:41) >  CONCLUSIONS:      1. After obtaining consent, Definity ultrasound enhancing agent was given for enhanced left ventricular opacification and improved delineation of the left ventricular endocardial borders. Left ventricular systolic function is severely decreased with a calculated ejection fraction of 22 % by the Chinchilla's biplane method of disks. There is a left ventricular thrombus.   2. Findings were discussed with Litzy BOSS on 11/2/2023 at 8.49am.   3. There is a left ventricular thrombus.    < end of copied text >   PATIENT:  LD VALENCIA  [id number]    CHIEF COMPLAINT:  Patient is a 58y old male who presents with a chief complaint of CP and SOB (06 Nov 2023 12:47)      INTERVAL HISTORY/OVERNIGHT EVENTS:  - Hydralazine increased  - Successful wean on IABP    REVIEW OF SYSTEMS:    Constitutional:     [X] negative [ ] fevers [ ] chills [ ] weight loss [ ] weight gain  HEENT:                  [X] negative [ ] dry eyes [ ] eye irritation [ ] postnasal drip [ ] nasal congestion  CV:                         [X] negative  [ ] chest pain [ ] orthopnea [ ] palpitations [ ] murmur  Resp:                     [X] negative [ ] cough [ ] shortness of breath [ ] dyspnea [ ] wheezing [ ] sputum [ ] hemoptysis  GI:                          [X] negative [ ] nausea [ ] vomiting [ ] diarrhea [ ] constipation [ ] abd pain [ ] dysphagia   :                        [X] negative [ ] dysuria [ ] nocturia [ ] hematuria [ ] increased urinary frequency  Musculoskeletal: [X] negative [ ] back pain [ ] myalgias [ ] arthralgias [ ] fracture  Skin:                       [X] negative [ ] rash [ ] itch  Neurological:        [X] negative [ ] headache [ ] dizziness [ ] syncope [ ] weakness [ ] numbness  Psychiatric:           [ ] negative [X] anxiety [ ] depression    [X] All other systems negative    MEDICATIONS:  MEDICATIONS  (STANDING):  albuterol    0.083% 2.5 milliGRAM(s) Nebulizer daily  aspirin  chewable 81 milliGRAM(s) Oral daily  atorvastatin 80 milliGRAM(s) Oral at bedtime  budesonide  80 MICROgram(s)/formoterol 4.5 MICROgram(s) Inhaler 2 Puff(s) Inhalation two times a day  chlorhexidine 2% Cloths 1 Application(s) Topical daily  dexMEDEtomidine Infusion 0.5 MICROgram(s)/kG/Hr (12.4 mL/Hr) IV Continuous <Continuous>  dextrose 50% Injectable 25 Gram(s) IV Push once  dextrose 50% Injectable 12.5 Gram(s) IV Push once  dextrose 50% Injectable 25 Gram(s) IV Push once  furosemide   Injectable 80 milliGRAM(s) IV Push daily  glucagon  Injectable 1 milliGRAM(s) IntraMuscular once  heparin  Infusion 950 Unit(s)/Hr (16 mL/Hr) IV Continuous <Continuous>  hydrALAZINE 40 milliGRAM(s) Oral every 8 hours  insulin glargine Injectable (LANTUS) 8 Unit(s) SubCutaneous at bedtime  insulin lispro (ADMELOG) corrective regimen sliding scale   SubCutaneous three times a day before meals  insulin lispro (ADMELOG) corrective regimen sliding scale   SubCutaneous at bedtime  isosorbide   dinitrate Tablet (ISORDIL) 30 milliGRAM(s) Oral three times a day  LORazepam     Tablet 0.5 milliGRAM(s) Oral every 6 hours  nitroprusside Infusion 0.5 MICROgram(s)/kG/Min (7.42 mL/Hr) IV Continuous <Continuous>  nystatin    Suspension 117051 Unit(s) Oral every 6 hours  pantoprazole    Tablet 40 milliGRAM(s) Oral before breakfast  sodium chloride 0.65% Nasal 1 Spray(s) Both Nostrils three times a day  spironolactone 25 milliGRAM(s) Oral daily  ticagrelor 90 milliGRAM(s) Oral every 12 hours  tiotropium 2.5 MICROgram(s) Inhaler 2 Puff(s) Inhalation daily    MEDICATIONS  (PRN):  dextrose Oral Gel 15 Gram(s) Oral once PRN Blood Glucose LESS THAN 70 milliGRAM(s)/deciliter    ALLERGIES:  penicillins (Unknown)    OBJECTIVE:  ICU Vital Signs Last 24 Hrs  T(C): 37 (06 Nov 2023 20:00), Max: 37 (06 Nov 2023 15:30)  T(F): 98.6 (06 Nov 2023 20:00), Max: 98.6 (06 Nov 2023 15:30)  HR: 93 (07 Nov 2023 06:00) (80 - 100)  ABP: 114/68 (07 Nov 2023 06:00) (80/66 - 132/80)  ABP(mean): 90 (07 Nov 2023 06:00) (59 - 103)  RR: 14 (07 Nov 2023 06:00) (13 - 34)  SpO2: 95% (07 Nov 2023 06:00) (88% - 96%)    O2 Parameters below as of 07 Nov 2023 02:00  Patient On (Oxygen Delivery Method): nasal cannula  O2 Flow (L/min): 4    Adult Advanced Hemodynamics Last 24 Hrs  CVP(mm Hg): 15 (07 Nov 2023 06:00) (9 - 39)  CVP(cm H2O): --  PA: 38/24 (07 Nov 2023 06:00) (22/2 - 215/204)  PA(mean): 30 (07 Nov 2023 06:00) (11 - 207)    CAPILLARY BLOOD GLUCOSE  POCT Blood Glucose.: 145 mg/dL (06 Nov 2023 21:28)  POCT Blood Glucose.: 194 mg/dL (06 Nov 2023 15:28)  POCT Blood Glucose.: 183 mg/dL (06 Nov 2023 12:30)  POCT Blood Glucose.: 173 mg/dL (06 Nov 2023 08:48)    I&O's Summary    05 Nov 2023 07:01  -  06 Nov 2023 07:00  --------------------------------------------------------  IN: 2122 mL / OUT: 3455 mL / NET: -1333 mL    06 Nov 2023 07:01  -  07 Nov 2023 06:25  --------------------------------------------------------  IN: 1961.2 mL / OUT: 3650 mL / NET: -1688.8 mL    PHYSICAL EXAMINATION:  General: Resting comfortably in NAD  HEENT: PERRL, moist mucous membranes  Neurology: A&Ox3, nonfocal, MOISE x 4  Respiratory: CTA B/L, normal respiratory effort, no wheezes, crackles, rales  CV: RRR,, S1S2, no murmurs, rubs or gallops  Abdominal: Soft, NT, ND +BS  Extremities: Warm, dry, no edema, + peripheral pulses      LABS:  ABG - ( 07 Nov 2023 00:37 )  pH, Arterial: 7.44  pH, Blood: x     /  pCO2: 33    /  pO2: 158   / HCO3: 22    / Base Excess: -1.2  /  SaO2: 99.4                            10.9   9.27  )-----------( 172      ( 07 Nov 2023 00:46 )             32.3     11-07    131<L>  |  100  |  24<H>  ----------------------------<  144<H>  3.5   |  19<L>  |  1.48<H>    Ca    8.7      07 Nov 2023 00:46  Phos  3.3     11-07  Mg     2.1     11-07    TPro  6.1  /  Alb  3.0<L>  /  TBili  0.6  /  DBili  x   /  AST  53<H>  /  ALT  50<H>  /  AlkPhos  41  11-07    LIVER FUNCTIONS - ( 07 Nov 2023 00:46 )  Alb: 3.0 g/dL / Pro: 6.1 g/dL / ALK PHOS: 41 U/L / ALT: 50 U/L / AST: 53 U/L / GGT: x           PTT - ( 07 Nov 2023 00:46 )  PTT:62.9 sec    Culture - Blood (collected 04 Nov 2023 10:40)  Source: .Blood Blood-Peripheral  Preliminary Report (06 Nov 2023 14:02):    No growth at 48 Hours    Culture - Blood (collected 04 Nov 2023 10:40)  Source: .Blood Blood-Peripheral  Preliminary Report (06 Nov 2023 14:02):    No growth at 48 Hours    < from: TTE Limited W or WO Ultrasound Enhancing Agent (11.02.23 @ 07:41) >  CONCLUSIONS:      1. After obtaining consent, Definity ultrasound enhancing agent was given for enhanced left ventricular opacification and improved delineation of the left ventricular endocardial borders. Left ventricular systolic function is severely decreased with a calculated ejection fraction of 22 % by the Chinchilla's biplane method of disks. There is a left ventricular thrombus.   2. Findings were discussed with Litzy BOSS on 11/2/2023 at 8.49am.   3. There is a left ventricular thrombus.    < end of copied text >

## 2023-11-07 NOTE — PROGRESS NOTE ADULT - ASSESSMENT
57 yo M with HTN, CAD (s/p PCI 2008), HFrEF (EF severely reduced 2018) not on GDMT, CVA 2018 (requiring tpA), DM presenting with c/o SOB for a week before presenting to the hospital. He had been treated for PNA. He was admitted with c/o chest pressure. Per family, patient passed out, EMS was called and reportedly defibrillated the patient. He was taken to [facility] where he was found to have ACS but he left AMA and came to [facility].    On arrival, ECG showed ST depression in lateral leads, no c/o chest pain. Pro-BNP 4k. Patient was intubated prior to LCHC on 11/1 for respiratory failure. LHC 11/1: pLAD 70 % stenosis in the ostium portion of the segment and 100 % in-stent restenosis. mRCA, 100 % stenosis. RA 23, PA 51/33, wedge 31, PA sat 54%, CI 1.5. S/p IABP on 11/1. He was successfully extubated to nasal canula on 11/3 and was also noted to be COVID positive currently with persistent fevers. S/p IABP for hemodynamic support in the setting of possible sepsis.       Cardiac Studies  11/1/23  [facility] showed pLAD 70 % stenosis in the ostium portion of the segment and 100 % in-stent restenosis and in mRCA, 100 % stenosis.   11/1/23 RHC; RA 23 Vw 24, PA 52/33/40, PCWP 30 Vw 33, AO 85/66/73, PA sat 54.4%, CO/CI (F) 2.96/1.44, IABP was placed during the cath through R common femoral artery.   11/1/23 TTE: LVIDd 6.4cm , LVEF 32%, regional WMA, grade II DD,  TAPSE 1.7cm, mld MR, trace TR,     Bedside hemodynamics  11/3: IABP 1:1 RA 5; PA 27/12/18; CO/CI 5.6/2.6; MAP 90-100s.  11/4/23 IABP 1:3 CVP 4 PA 37/18 SvO2 83.8 CO/CI 11.2 CI 5.1  (in the setting of fever to 38.3C)  11/5 IABP 1:1 CVP 3 PA 48/27 SvO2 78.4% CO 8.2 CI 3.7     11/7 IABP removed

## 2023-11-07 NOTE — PROGRESS NOTE ADULT - ATTENDING COMMENTS
53 yo M with known CAD and HFrEF (TTE 2018 showed low LVEF with anteroseptal akinesis) who presented with COVID infection, NSTEMI and CS.  RHC showed biventricular congestion and low CI. LHC showed occluded LAD and severe diffuse RCA disease (filling via L-R collaterals)  Improved with IABP and afterload reduction.     Initially failed IABP wean with hypotension, reduction of CI and worsening Cr, LFTs and Na.  On 11/6: RA~13, PAd~27 and CI~3 on IABP 1:1 and Nipride @2mcg/kg/min     After diuresis, he was weaned successfully without inotropes  Cr 1.6, baseline 1.1  AST/ALT improving close to normal now   CVP~12-13    - Diurese with Lasix IV discussed with CCU team  - Continue Increase Isordil Hydral, wean Nipride   - Continue Spironolactone - When Cr downtrending for 2 continuous days will start low dose ARB and transition to ARNI  - Continue ASA and Ticagrelor  - Would pursue CMRI to assess viability of anterior wall at least 10 days from presentation (after 11/10) - no collaterals are seen to LAD territory and there was a clear rise in troponin this admission   - Recovery remains dubious, will breach subject of AT work-up with patient     Erlin Quijano MD

## 2023-11-07 NOTE — PROGRESS NOTE ADULT - PROVIDER SPECIALTY LIST ADULT
LEXUS HPI:  75F w/PMHx of AFIB on DOAC, Parkinson's, HTN/HLD, DM2 presents to complaints of right sided facial numbness since yesterday.  Patient reports symptoms began at [address].  She reports a numbness to the right side of her face with associated vertigo.  Patient reports the symptoms would wax and wane.  She then develops the numbness travelled to her right ear and began to creep down her face today so she came to ED for eval.  No falls.  No CP/SOB.  No abdominal or urinary complaints.   (15 Aug 2022 18:15)        HPI-Cardiology   75F w/ pmh AFIB on xarelto, HTN, HLD, DM2, Parkinson's, tremors, agoraphobia presented for intermittent R facial numbness and dizziness, also had episode of slurred speech this am although CTH was neg. Cardiology called for sustained bradycardia on tele. Pt evaluated at bedside currently mentating at her baseline, asymptomatic, denies headache, chest pain, sob, palpitation. She previously followed w/ Dr Whaley for her Afib, last visit was ~2yrs ago, on home Cardizem 300 and Toprol 50    PAST MEDICAL & SURGICAL HISTORY  Chronic atrial fibrillation  herniated disc    Diabetes    Hypertension    COPD (chronic obstructive pulmonary disease)    Anxiety    Cervical spine pain    Atrial fibrillation    Tremor    Agoraphobia    S/P appendectomy    H/O: hysterectomy    Previous back surgery        FAMILY HISTORY:  FAMILY HISTORY:  FH: leukemia (Mother)  Mother  from leukemia    FH: stomach cancer (Sibling)  sister        SOCIAL HISTORY:  []smoker  []Alcohol  []Drug    ALLERGIES:  IV Contrast (Rash; Flushing; Hives)  Percocet 10/325 (Short breath)  Percodan (Hives)  strawberry (Unknown)      MEDICATIONS:  MEDICATIONS  (STANDING):  atorvastatin 40 milliGRAM(s) Oral at bedtime  budesonide 160 MICROgram(s)/formoterol 4.5 MICROgram(s) Inhaler 2 Puff(s) Inhalation two times a day  dextrose 5%. 1000 milliLiter(s) (50 mL/Hr) IV Continuous <Continuous>  dextrose 50% Injectable 25 Gram(s) IV Push once  diltiazem    milliGRAM(s) Oral daily  furosemide    Tablet 40 milliGRAM(s) Oral daily  gabapentin 300 milliGRAM(s) Oral every 8 hours  glucagon  Injectable 1 milliGRAM(s) IntraMuscular once  insulin lispro (ADMELOG) corrective regimen sliding scale   SubCutaneous three times a day before meals  levothyroxine 50 MICROGram(s) Oral daily  metoprolol succinate ER 50 milliGRAM(s) Oral daily  pantoprazole    Tablet 40 milliGRAM(s) Oral before breakfast  primidone 50 milliGRAM(s) Oral at bedtime  rivaroxaban 15 milliGRAM(s) Oral with dinner  tiotropium 18 MICROgram(s) Capsule 1 Capsule(s) Inhalation daily    MEDICATIONS  (PRN):  acetaminophen     Tablet .. 650 milliGRAM(s) Oral every 6 hours PRN Temp greater or equal to 38C (100.4F), Mild Pain (1 - 3)  ALPRAZolam 0.5 milliGRAM(s) Oral three times a day PRN anxiety  dextrose Oral Gel 15 Gram(s) Oral once PRN Blood Glucose LESS THAN 70 milliGRAM(s)/deciliter  meclizine 25 milliGRAM(s) Oral every 8 hours PRN Dizziness  melatonin 3 milliGRAM(s) Oral at bedtime PRN Insomnia  ondansetron Injectable 4 milliGRAM(s) IV Push every 8 hours PRN Nausea and/or Vomiting      HOME MEDICATIONS:  Home Medications:  ALPRAZolam 0.5 mg oral tablet: 1 tab(s) orally 3 times a day, As Needed (15 Aug 2022 18:18)  Cardizem  mg/24 hours oral capsule, extended release: 1 cap(s) orally once a day (15 Aug 2022 18:18)  glipizide-metformin 5 mg-500 mg oral tablet: 1 tab(s) orally 2 times a day (15 Aug 2022 18:18)  Incruse Ellipta 62.5 mcg/inh inhalation powder: 1 puff(s) inhaled every 24 hours (15 Aug 2022 18:18)  Lasix 20 mg oral tablet: 2 tab(s) orally once a day (15 Aug 2022 18:18)  Lidoderm 5% topical film: Apply topically to affected area once a day, As Needed (15 Aug 2022 18:18)  Metoprolol Succinate ER 50 mg oral tablet, extended release: 1 tab(s) orally once a day (15 Aug 2022 18:18)  primidone 50 mg oral tablet: 1 tab(s) orally once a day (at bedtime) (15 Aug 2022 18:18)  rivaroxaban 15 mg oral tablet: 1 tab(s) orally once a day (in the evening) (15 Aug 2022 18:18)  rosuvastatin 10 mg oral tablet: 1 tab(s) orally once a day (15 Aug 2022 18:18)  Synthroid 50 mcg (0.05 mg) oral tablet: 1 tab(s) orally once a day (15 Aug 2022 18:18)      VITALS:   T(F): 96.7 ( @ 14:00), Max: 98.8 (08-15 @ 13:58)  HR: 75 ( @ 14:00) (54 - 75)  BP: 126/52 ( @ 14:00) (121/56 - 148/58)  BP(mean): --  RR: 17 ( @ 05:27) (17 - 18)  SpO2: 99% ( @ 12:37) (98% - 99%)    I&O's Summary      REVIEW OF SYSTEMS:  CONSTITUTIONAL: No weakness, fevers or chills  EYES: No visual changes  ENT: No vertigo or throat pain   NECK: No pain or stiffness  RESPIRATORY: No cough, wheezing, hemoptysis; No shortness of breath  CARDIOVASCULAR: No chest pain or palpitations  GASTROINTESTINAL: No abdominal or epigastric pain. No nausea, vomiting, or hematemesis; No diarrhea or constipation. No melena or hematochezia.  GENITOURINARY: No dysuria, frequency or hematuria  NEUROLOGICAL: No numbness or weakness  SKIN: No itching, no rashes  MSK: no    PHYSICAL EXAM:  NEURO: patient is awake , alert and oriented  GEN: Not in acute distress  NECK: no thyroid enlargement, no JVD  LUNGS: Clear to auscultation bilaterally   CARDIOVASCULAR: S1/S2 present, RRR , no murmurs or rubs, no carotid bruits,  + PP bilaterally  ABD: Soft, non-tender, non-distended, +BS  EXT: No JAMIE  SKIN: Intact    LABS:                        8.5    9.44  )-----------( 441      ( 16 Aug 2022 09:50 )             30.9     08-16    142  |  99  |  14  ----------------------------<  130<H>  4.5   |  33<H>  |  1.1    Ca    9.2      16 Aug 2022 09:50    TPro  6.7  /  Alb  3.9  /  TBili  <0.2  /  DBili  x   /  AST  9   /  ALT  <5  /  AlkPhos  115                Troponin trend:    RADIOLOGY:  TTE  < from: TTE Echo Complete w/o Contrast w/ Doppler (22 @ 14:43) >  Summary:   1. Technically difficult study.   2. Left ventricular ejection fraction, by visual estimation, is 60 to   65%.   3. Normal right ventricular size and function.   4. Mildly enlarged left atrium.   5. Mild mitral annular calcification.   6. There is no evidence of pericardial effusion.    < end of copied text >    ECG  < from: 12 Lead ECG (22 @ 14:40) >  Ventricular Rate 46 BPM    Atrial Rate 48 BPM    P-R Interval 180 ms    QRS Duration 130 ms    Q-T Interval 510 ms    QTC Calculation(Bazett) 446 ms    P Axis -80 degrees    R Axis -64 degrees    T Axis 0 degrees    Diagnosis Line Sinus bradycardia  Right bundle branch block  Left anterior fascicular block  *** Bifascicular block ***    < end of copied text >    ECG:    TELEMETRY EVENTS:

## 2023-11-07 NOTE — PROGRESS NOTE ADULT - SUBJECTIVE AND OBJECTIVE BOX
Patient seen and examined at bedside.    Overnight Events: Agitated today, refused lasix this AM, still vol overloaded, spoke to patient in depth about the importance of medication compliance and working together with CCU team to help take care of him. IABP pulled by primary team.     Review of Systems: Negative except as per HPI     Current Meds:  albuterol    0.083% 2.5 milliGRAM(s) Nebulizer daily  aspirin  chewable 81 milliGRAM(s) Oral daily  atorvastatin 80 milliGRAM(s) Oral at bedtime  budesonide  80 MICROgram(s)/formoterol 4.5 MICROgram(s) Inhaler 2 Puff(s) Inhalation two times a day  chlorhexidine 2% Cloths 1 Application(s) Topical daily  dexMEDEtomidine Infusion 0.5 MICROgram(s)/kG/Hr IV Continuous <Continuous>  dextrose 50% Injectable 25 Gram(s) IV Push once  dextrose 50% Injectable 12.5 Gram(s) IV Push once  dextrose 50% Injectable 25 Gram(s) IV Push once  dextrose Oral Gel 15 Gram(s) Oral once PRN  furosemide   Injectable 80 milliGRAM(s) IV Push daily  glucagon  Injectable 1 milliGRAM(s) IntraMuscular once  hydrALAZINE 75 milliGRAM(s) Oral every 8 hours  insulin glargine Injectable (LANTUS) 8 Unit(s) SubCutaneous at bedtime  insulin lispro (ADMELOG) corrective regimen sliding scale   SubCutaneous three times a day before meals  insulin lispro (ADMELOG) corrective regimen sliding scale   SubCutaneous at bedtime  isosorbide   dinitrate Tablet (ISORDIL) 30 milliGRAM(s) Oral three times a day  LORazepam     Tablet 0.5 milliGRAM(s) Oral every 6 hours  nitroprusside Infusion 0.5 MICROgram(s)/kG/Min IV Continuous <Continuous>  nystatin    Suspension [id number] Unit(s) Oral every 6 hours  pantoprazole    Tablet 40 milliGRAM(s) Oral before breakfast  sodium chloride 0.65% Nasal 1 Spray(s) Both Nostrils three times a day  spironolactone 25 milliGRAM(s) Oral daily  ticagrelor 90 milliGRAM(s) Oral every 12 hours  tiotropium 2.5 MICROgram(s) Inhaler 2 Puff(s) Inhalation daily      Vitals:  T(F): 98.8 (11-07), Max: 99.1 (11-07)  HR: 65 (11-07) (65 - 98)  BP: --  RR: 23 (11-07)  SpO2: 96% (11-07)  I&O's Summary    06 Nov 2023 07:01  -  07 Nov 2023 07:00  --------------------------------------------------------  IN: 2008.1 mL / OUT: 3850 mL / NET: -1841.9 mL    07 Nov 2023 07:01  -  07 Nov 2023 15:42  --------------------------------------------------------  IN: 303.9 mL / OUT: 1150 mL / NET: -846.1 mL        Physical Exam:  Appearance: No acute distress; well appearing  Eyes: PERRL, EOMI, pink conjunctiva  HEENT: Normal oral mucosa  Cardiovascular: RRR, S1, S2, no murmurs, rubs, or gallops; no edema; mod JVD  Respiratory: Clear to auscultation on anterior exam   Gastrointestinal: soft, non-tender, non-distended with normal bowel sounds  Musculoskeletal: No clubbing; no joint deformity   Neurologic: Non-focal  Lymphatic: No lymphadenopathy  Psychiatry: AAOx3, mood & affect appropriate  Skin: No rashes, ecchymoses, or cyanosis                          10.9   9.27  )-----------( 172      ( 07 Nov 2023 00:46 )             32.3     11-07    130<L>  |  100  |  25<H>  ----------------------------<  165<H>  4.0   |  20<L>  |  1.60<H>    Ca    8.9      07 Nov 2023 13:13  Phos  3.1     11-07  Mg     2.1     11-07    TPro  6.5  /  Alb  3.2<L>  /  TBili  0.8  /  DBili  x   /  AST  47<H>  /  ALT  47<H>  /  AlkPhos  41  11-07    PTT - ( 07 Nov 2023 00:46 )  PTT:62.9 sec  CARDIAC MARKERS ( 03 Nov 2023 01:49 )  1226 ng/L / x     / x     / x     / x     / x      CARDIAC MARKERS ( 01 Nov 2023 07:51 )  414 ng/L / x     / x     / x     / x     / x      CARDIAC MARKERS ( 01 Nov 2023 06:14 )  440 ng/L / x     / x     / x     / x     / x

## 2023-11-07 NOTE — PROGRESS NOTE ADULT - ASSESSMENT
57 yo M with PMHx of HTN, CAD w/ 1 stent in 2009, ICH (2008) presented on 11/1 with abn ekg. Patient presented to [facility] where he was found to have STEMI, recommended to get cath however patient did not want to get it there so he left and came here.   EKG here with ST depression in lateral leads and elevation in anterior leads   Prior to [facility] found to be tachycardic, dyspneic, intubated   [facility] 11/1 with chronic total occlusion of LAD and RCA, with elevated RA and PA pressures  TTE 11/1 with severely decreased EF 32%, s/p IABP 11/1  Afebrile   No leukocytosis   Reportedly had an outpatient course of Levofloxacin for pneumonia   Tested + Covid   CXR: clear lungs     Antimicrobials:  Cefepime   Vancomycin     #Acute hypoxic respiratory failure on ventilator likely 2/2 to cardiogenic shock in the setting of STEMI, currently on low Fi02 and no secretions, low concern of pneumonia on CXR   #Covid infection   Respiratory failure, suspected cardiac basis.  Per discussion with critical care team indication for antibiotics was foaming at the mouth and concern of aspiration event around that time.  No clear convincing evidence of aspiration at this juncture.  Chest exam is fairly clear, imaging without clear convincing evidence of pneumonia, FiO2 requirement minimal.  If there is concern about potential pneumonia despite benign appearing chest x-ray, would check POCUS.  However unless less stronger support emerges for antibiotic use, I would discontinue them.  With respect to COVID, the respiratory failure is unrelated to this diagnosis.  I do not think he merits remdesivir or steroids at this juncture.    11/3: No concern for infection other than COVID. Latter incidental at this point in time. I do not think it merits treatment.   11/6: low grade fever, able to stay extubated. Heart failure team advocating remdesivir Rx. Prior to this he was afebrile. Presently no objection to RDV since he is now febrile but not clear how much benefit, if any, he will get. Presently not hypoxic and I continue to think prior respiratory failure was on a cardiac basis, not COVID. As such I do not think steroids are warranted. Patient has improved, and remained extubated, despite being off antibiotics. Patient was inquiring whether antibiotics would prevent infection in his circumstance, d/w him that prophylactic antibiotics not indicated here, and that inappropriate use would select resistant kaitlynn without giving him benefit. .  11/7: No fevers. No concern for infection on exam.    Suggestions--  I see no role for antibiotics here  Precautions per protocol  Please recall as needed. I'll sign off at this time. Thank you for the courtesy of this referral.    Enrique Arrington MD  Attending Physician  [facility]  Division of Infectious Diseases  [phone]

## 2023-11-07 NOTE — PROGRESS NOTE ADULT - PROBLEM SELECTOR PLAN 5
- Persistent fevers in the setting COVID infection with high SvO2s   - On empiric antibiotics   - F/u BCx

## 2023-11-07 NOTE — PROGRESS NOTE ADULT - ASSESSMENT
ASSESSMENT  59 yo M with HTN, CAD (s/p PCI 2008), HFrEF (EF severely reduced 2018) not on GDMT, CVA 2018 (requiring tpA), T2DM presenting with chest pressure and unknown tachycardia that was shocked x1, C 11/1 found to have in stent restenosis of pLAD and  of RCA with elevated RA and PA pressures and severely decreased EF 32%, s/p IABP 11/1 and admitted to CICU for management of cardiogenic shock.    NEURO  -  A&Ox3    # Hx of CVA  - No neuro deficits known    # Anxiety  - Takes 0.5 ativan PRN at home  - c/w ativan 0.5 q6 hours ATCx3 days then PRN  - Wean off precedex  - Psych consult for medical management of anxiety?    PULM  # AHRF 2/2 pulmonary edema requiring Intubation   - Extubated 11/3  - Wean O2 as tolerated, currently on 2L NC    # COVID +  - Maintain Airborne precautions  - Defer to ID for remdesivir or steroids    # Right sided opacification on CXR 11/2  - Pt. initially had cough, congestion, fever, was placed on antibiotics on Juan 10/29  - s/p levaquin (10/29-10/31)  - 11/6 CXR clear  - Watch off abx; vanc and cefepime (11/2)  - Sputum cx 11/2 NGTD  - Encourage IS    # Asthma (hx of respiratory failure in 2018- intubated for 20 minutes per chart review pt report)  - c/w albuterol qd  - c/w home trelegy     CV  # Cardiogenic shock 2/2 HF exacerbation requiring IABP (11/1-)  - 11/1 [facility]: pLAD 100 % in-stent restenosis & mRCA, 100 %. PCWP 30  - 11/1 TTE: LV dilated. EF 32 %. Regional WMAs present, mod (grade 2) LV diastolic dysfunction  - 11/2 TTE: EF 22% and + LV thrombus   - Per echo areas of akinesis are not new compared to 2017, but EF is severely decreased   - Currently on IABP 1:1, with means 80s, attempt wean today  - c/w nipride gtt for weaning of IABP  - c/w hydralazine and isordil for AL reduction  - Trend swan and CVP numbers  - Daily CXR for IABP  - Keep right leg straight while sheaths/IABP/[place] are in position     # HFrEF w/ severely reduced EF 22%  - s/p lasix 40 11/1  - s/p Lasix 80 11/6  - Start GDMT when stable  - CXR 11/6 clear   - Strict I&O  - Daily weights  - Fluid restriction  - On ozempic and dapaglifozin at home    # Stent re-occlusion of pLAD and 100%  of RCA  - CE cleared  - EKG here w/ LBBB  -c/w ASA, brillinta (takes plavix at home?)  -c/w lipitor 80  -c/w heparin gtt  - CT sx not recommending CABG, instead AT eval  - Viability study when IABP removed    # LV thrombus  - Seen on TTE 11/2  - c/w heparin gtt    # HTN  - Holding home losartan 25, HCTZ 25, coreg 25 for now    /RENAL  # ARIC  - Baseline Cr: 1-1.22, currently 1.48, stable  - Trend BMP, lytes and UOP  - Strict I/Os  - Avoid nephrotoxins    # Metabolic acidosis  - Likely 2/2 lactate of 3.7, due to cardiogenic shock  - Resolved    GI  # Transaminitis  - Likely iso cardiogenic shock  - AST/ALT: 94/50 on admission  - Improving, continue to trend    # GERD  - c/w home protonix  - Tolerating diet  - BM 11/6    # Bowel regimen  - miralax and senna d/c'd iso loose stools    ENDO  # Type 2 DM  - A1c 8.3  - c/w lantus and ISS  - -160s    HEME  # VTE prophylaxis   - H/H stable  - c/w heparin gtt    ID  # Concern for aspiration event prior to intubation  - Right sided opacification on CXR 11/2, clear today 11/6  - Last fever 11/4 (100.6)  - Blood cultures 11/2 and 11/4 NGTD  - UA 11/4 negative  - MRSA swab negative  - Continue to monitor off ABx (s/p vanco and cefepime 11/2)  - ID following    # COVID  - Maintain airborne precautions    GOC  - Full code  - Remain in ICU      Patient requires continuous monitoring with bedside rhythm monitoring, pulse ox monitoring, and intermittent blood gas analysis. Care plan discussed with ICU care team. Patient remained critical and at risk for life threatening decompensation.    Patient seen, examined and plan discussed with CCU team during rounds.   I have personally provided 35 minutes of critical care time excluding time spent on separate procedures, in addition to initial critical care time provided by the CICU Attending, Dr. Lees.    Rita Henry, [facility]-BC ASSESSMENT  57 yo M with HTN, CAD (s/p PCI 2008), HFrEF, CVA 2018, and T2DM presenting with chest pressure and unknown tachycardia that was shocked x1, C 11/1 found to have in-stent restenosis of pLAD and  of RCA with elevated RA and PA pressures and severely decreased EF 32%, s/p IABP 11/1 and admitted to CICU for management of cardiogenic shock.    NEURO  -  A&Ox3    # Hx of CVA  - No neuro deficits known    # Anxiety  - Takes 0.5 ativan PRN at home  - c/w ativan 0.5 q6 hours ATCx3 days then PRN  - Wean off precedex  - f/u psych consult for medical management of anxiety    PULM  # AHRF 2/2 pulmonary edema requiring Intubation   - Extubated 11/3  - Wean O2 as tolerated, currently on 2L NC    # COVID +  - Maintain Airborne precautions  - Defer to ID for remdesivir or steroids    # Right sided opacification on CXR 11/2  - Pt. initially had cough, congestion, fever, was placed on antibiotics on Juan 10/29  - s/p levaquin (10/29-10/31)  - 11/6 CXR clear  - Watch off abx; vanc and cefepime (11/2)  - Sputum cx 11/2 NGTD  - Encourage IS    # Asthma (hx of respiratory failure in 2018- intubated for 20 minutes per chart review pt report)  - c/w albuterol qd  - c/w home trelegy     CV  # Cardiogenic shock 2/2 HF exacerbation requiring IABP (11/1-)  - 11/1 [facility]: pLAD 100 % in-stent restenosis & mRCA, 100 %. PCWP 30  - 11/1 TTE: LV dilated. EF 32 %. Regional WMAs present, mod (grade 2) LV diastolic dysfunction  - 11/2 TTE: EF 22% and + LV thrombus   - Per echo areas of akinesis are not new compared to 2017, but EF is severely decreased  - Currently on IABP 1:1, successfully weaned, plan to remove today  - Titrate off nipride after IABP removal, decreased from 2 to 0.5 overnight  - Hydralazine increased to 75 from 40 overnight and c/w isordil for AL reduction  - Trend swan and CVP numbers  - Daily CXR for IABP  - Keep right leg straight while sheaths/IABP/[place] are in position     # HFrEF w/ severely reduced EF 22%  - s/p lasix 40 11/1  - s/p lasix 80 11/6, possible additional dosing today, currently net negative 1.6L  - Start GDMT when stable  - CXR 11/6 clear   - Strict I&O  - Daily weights  - Fluid restriction  - On ozempic and dapaglifozin at home    # Stent re-occlusion of pLAD and 100%  of RCA  - CE cleared  - EKG here w/ LBBB  -c/w ASA, brillinta (takes plavix at home?)  -c/w lipitor 80  -c/w heparin gtt  - CT sx not recommending CABG, instead AT eval  - Viability study when IABP removed    # LV thrombus  - Seen on TTE 11/2  - c/w heparin gtt    # HTN  - Holding home losartan 25, HCTZ 25, coreg 25 for now    /RENAL  # ARIC  - Baseline Cr: 1-1.22, currently 1.48, stable  - Trend BMP, lytes and UOP  - Strict I/Os  - Avoid nephrotoxins    # Metabolic acidosis  - Likely 2/2 lactate of 3.7, due to cardiogenic shock  - Resolved    GI  # Transaminitis  - Likely iso cardiogenic shock  - AST/ALT: 94/50 on admission  - Improving, continue to trend    # GERD  - c/w home protonix  - Tolerating diet  - BM 11/6    # Bowel regimen  - miralax and senna d/c'd iso loose stools    ENDO  # Type 2 DM  - A1c 8.3  - c/w lantus and ISS  - -160s    HEME  # VTE prophylaxis   - H/H stable  - c/w heparin gtt    ID  # Concern for aspiration event prior to intubation  - Right sided opacification on CXR 11/2, clear today 11/6  - Last fever 11/4 (100.6)  - Blood cultures 11/2 and 11/4 NGTD  - UA 11/4 negative  - MRSA swab negative  - Continue to monitor off ABx (s/p vanco and cefepime 11/2)  - ID following    # COVID  - Maintain airborne precautions    GOC  - Full code  - Remain in ICU      Patient requires continuous monitoring with bedside rhythm monitoring, pulse ox monitoring, and intermittent blood gas analysis. Care plan discussed with ICU care team. Patient remained critical and at risk for life threatening decompensation.    Patient seen, examined and plan discussed with CCU team during rounds.   I have personally provided 35 minutes of critical care time excluding time spent on separate procedures, in addition to initial critical care time provided by the CICU Attending, Dr. Lees.    Rita Henry, [facility]-BC ASSESSMENT  57 yo M with HTN, CAD (s/p PCI 2008), HFrEF, CVA 2018, and T2DM presenting with chest pressure and unknown tachycardia that was shocked x1, C 11/1 found to have in-stent restenosis of pLAD and  of RCA with elevated RA and PA pressures and severely decreased EF 32%, s/p IABP 11/1 and admitted to CICU for management of cardiogenic shock.    NEURO  -  A&Ox3    # Hx of CVA  - No neuro deficits known    # Anxiety  - Takes 0.5 ativan PRN at home  - c/w ativan 0.5 q6 hours ATCx3 days then PRN  - Wean off precedex  - f/u psych consult for medical management of anxiety    PULM  # AHRF 2/2 pulmonary edema requiring Intubation   - Extubated 11/3  - Wean O2 as tolerated, currently on 2L NC    # COVID +  - Maintain Airborne precautions  - Defer to ID for remdesivir or steroids    # Right sided opacification on CXR 11/2  - Pt. initially had cough, congestion, fever, was placed on antibiotics on Juan 10/29  - s/p levaquin (10/29-10/31)  - 11/6 CXR clear  - Watch off abx; vanc and cefepime (11/2)  - Sputum cx 11/2 NGTD  - Encourage IS    # Asthma (hx of respiratory failure in 2018- intubated for 20 minutes per chart review pt report)  - c/w albuterol qd  - c/w home trelegy     CV  # Cardiogenic shock 2/2 HF exacerbation requiring IABP (11/1-)  - 11/1 [facility]: pLAD 100 % in-stent restenosis & mRCA, 100 %. PCWP 30  - 11/1 TTE: LV dilated. EF 32 %. Regional WMAs present, mod (grade 2) LV diastolic dysfunction  - 11/2 TTE: EF 22% and + LV thrombus   - Per echo areas of akinesis are not new compared to 2017, but EF is severely decreased  - Currently on IABP 1:1, successfully weaned, plan to remove today  - Titrate off nipride after IABP removal, decreased from 2 to 0.5 overnight  - Hydralazine increased to 75 from 40 overnight and c/w isordil for AL reduction  - Trend swan and CVP numbers  - Daily CXR for IABP  - Keep right leg straight while sheaths/IABP/[place] are in position     # HFrEF w/ severely reduced EF 22%  - s/p lasix 40 11/1  - s/p lasix 80 11/6, possible additional dosing today, currently net negative 1.6L  - Start GDMT when stable  - CXR 11/6 clear   - Strict I&O  - Daily weights  - Fluid restriction  - On ozempic and dapaglifozin at home    # Stent re-occlusion of pLAD and 100%  of RCA  - CE cleared  - EKG here w/ LBBB  -c/w ASA, brillinta (takes plavix at home?)  -c/w lipitor 80  -c/w heparin gtt  - CT sx not recommending CABG, instead AT eval  - Viability study when IABP removed    # LV thrombus  - Seen on TTE 11/2  - c/w heparin gtt    # HTN  - Holding home losartan 25, HCTZ 25, coreg 25 for now    /RENAL  # ARIC  - Baseline Cr: 1-1.22, currently 1.48, stable  - Trend BMP, lytes and UOP  - Strict I/Os  - Avoid nephrotoxins    # Metabolic acidosis  - Likely 2/2 lactate of 3.7, due to cardiogenic shock  - Resolved    GI  # Transaminitis  - Likely iso cardiogenic shock  - AST/ALT: 94/50 on admission  - Improving, continue to trend    # GERD  - c/w home protonix  - Tolerating diet  - BM 11/6    # Bowel regimen  - miralax and senna d/c'd iso loose stools    ENDO  # Type 2 DM  - A1c 8.3  - c/w lantus and ISS  - -160s    HEME  # VTE prophylaxis   - H/H stable  - c/w heparin gtt    ID  # Concern for aspiration event prior to intubation  - Right sided opacification on CXR 11/2, clear today   - Last fever 11/4 (100.6)  - Blood cultures 11/2 and 11/4 NGTD  - UA 11/4 negative  - MRSA swab negative  - Continue to monitor off ABx (s/p vanco and cefepime 11/2)  - ID following    # COVID  - Maintain airborne precautions    GOC  - Full code  - Remain in ICU      Patient requires continuous monitoring with bedside rhythm monitoring, pulse ox monitoring, and intermittent blood gas analysis. Care plan discussed with ICU care team. Patient remained critical and at risk for life threatening decompensation.    Patient seen, examined and plan discussed with CCU team during rounds.   I have personally provided 35 minutes of critical care time excluding time spent on separate procedures, in addition to initial critical care time provided by the CICU Attending, Dr. Lees.    Rita Henry, [facility]-BC ASSESSMENT  59 yo M with HTN, CAD (s/p PCI 2008), HFrEF, CVA 2018, and T2DM presenting with chest pressure and unknown tachycardia that was shocked x1, LHC 11/1 found to have in-stent restenosis of pLAD and  of RCA with elevated RA and PA pressures and severely decreased EF 32%, s/p IABP 11/1 and admitted to CICU for management of cardiogenic shock.    NEURO  -  A&Ox3    # Hx of CVA  - No neuro deficits known    # Anxiety  - Takes 0.5 ativan PRN at home  - c/w ativan 0.5 q6 hours ATCx3 days then PRN  - Wean off precedex  - f/u psych consult for medical management of anxiety    PULM  # AHRF 2/2 pulmonary edema requiring Intubation   - Extubated 11/3  - Wean O2 as tolerated, currently on 2L NC    # COVID +  - Maintain Airborne precautions  - Defer to ID for remdesivir or steroids    # Asthma (hx of respiratory failure in 2018- intubated for 20 minutes per chart review pt report)  - c/w albuterol qd  - c/w home trelegy     CV  # Cardiogenic shock 2/2 HF exacerbation requiring IABP (11/1-)  - 11/1 [facility]: pLAD 100 % in-stent restenosis & mRCA, 100 %. PCWP 30  - 11/1 TTE: LV dilated. EF 32 %. Regional WMAs present, mod (grade 2) LV diastolic dysfunction  - 11/2 TTE: EF 22% and + LV thrombus   - Per echo areas of akinesis are not new compared to 2017, but EF is severely decreased  - Currently on IABP 1:1, successfully weaned, plan to remove today  - Titrate off nipride after IABP removal  - Hydralazine increased to 75 from 40 overnight and c/w isordil for AL reduction  - Trend CVP  - Daily CXR for IABP  - Keep right leg straight while sheaths/IABP/[place] are in position     # HFrEF w/ severely reduced EF 22%  - s/p lasix 40 11/1  - s/p lasix 80 11/6, c/w daily  - Start GDMT when stable  - CXR 11/7 clear   - Strict I&O, currently net negative  - Daily weights  - Fluid restriction  - On ozempic and dapaglifozin at home    # Stent re-occlusion of pLAD and 100%  of RCA  - CE cleared  - EKG here w/ LBBB  - c/w ASA, brillinta (takes plavix at home?)  - c/w lipitor 80  - c/w heparin gtt  - CT sx not recommending CABG, instead AT eval  - Viability study when IABP removed    # LV thrombus  - Seen on TTE 11/2  - c/w heparin gtt    # HTN  - Holding home losartan 25, HCTZ 25, coreg 25 for now    /RENAL  # ARIC  - Baseline Cr: 1-1.22, currently 1.48, stable  - Trend BMP, lytes and UOP  - Strict I/Os  - Avoid nephrotoxins    # Metabolic acidosis  - Likely 2/2 lactate of 3.7, due to cardiogenic shock  - Resolved    GI  # Transaminitis  - Likely iso cardiogenic shock  - AST/ALT: 94/50 on admission  - Improving, continue to trend    # GERD  - c/w home protonix  - Tolerating diet  - BM 11/6    # Bowel regimen  - miralax and senna d/c'd iso loose stools    ENDO  # Type 2 DM  - A1c 8.3  - c/w lantus and ISS  - -160s    HEME  # VTE prophylaxis   - H/H stable  - c/w heparin gtt    ID  # Concern for aspiration event prior to intubation  - Right sided opacification on CXR 11/2, clear today   - Last fever 11/4 (100.6)  - Blood cultures 11/2 and 11/4 NGTD  - UA 11/4 negative  - MRSA swab negative  - Continue to monitor off ABx (s/p vanco and cefepime 11/2)  - ID following    # COVID  - Maintain airborne precautions    GOC  - Full code  - Remain in ICU      Patient requires continuous monitoring with bedside rhythm monitoring, pulse ox monitoring, and intermittent blood gas analysis. Care plan discussed with ICU care team. Patient remained critical and at risk for life threatening decompensation.    Patient seen, examined and plan discussed with CCU team during rounds.   I have personally provided 35 minutes of critical care time excluding time spent on separate procedures, in addition to initial critical care time provided by the CICU Attending, Dr. Lees.    Rita Henry, [facility]

## 2023-11-07 NOTE — PROGRESS NOTE ADULT - ASSESSMENT
59 yo male h/o htn, cad s/p pci, ICH, here with NSTEMI  s/p intubation and cath. now in CCU    NSTEMI  s/p  cath  cath results noted. multi vessel dz., CTSx f/u.   will need viability study  hep gtt  IABP now removed     resp failure  s/p intubation  now extubated   stable    LV thrombus   hep gtt    acute on chronic systolic heart failure  heart failure team f/u  s/p IABP  diuresis    pna  recently diagnosed outpt  iv abx  f/u cult   id following     covid  supportive care    h/o ICH  resolved    mngt as per CCU team  d/w CCU attn    d/w pts pmd        Advanced care planning was discussed with patient and family.  Advanced care planning forms were reviewed and discussed as appropriate.  Differential diagnosis and plan of care discussed with patient after the evaluation.   Pain assessed and judicious use of narcotics when appropriate was discussed.  Importance of Fall prevention discussed.  Counseling on Smoking and Alcohol cessation was offered when appropriate.  Counseling on Diet, exercise, and medication compliance was done.       Approx 75 minutes spent.

## 2023-11-07 NOTE — PROGRESS NOTE ADULT - ASSESSMENT
ASSESSMENT  59 yo M with HTN, CAD (s/p PCI 2008), HFrEF, CVA 2018, and T2DM presenting with chest pressure and unknown tachycardia that was shocked x1, LHC 11/1 found to have in-stent restenosis of pLAD and  of RCA with elevated RA and PA pressures and severely decreased EF 32%, s/p IABP 11/1 and admitted to CICU for management of cardiogenic shock.    NEURO  -  A&Ox3    # Hx of CVA  - No neuro deficits known    # Anxiety  - Takes 0.5 ativan PRN at home  - c/w ativan 0.5 q6 hours ATCx3 days then PRN  - Wean off precedex  - f/u psych consult for medical management of anxiety    PULM  # AHRF 2/2 pulmonary edema requiring Intubation   - Extubated 11/3  - Wean O2 as tolerated, currently on 2L NC    # COVID +  - Maintain Airborne precautions  - Defer to ID for remdesivir or steroids    # Asthma (hx of respiratory failure in 2018- intubated for 20 minutes per chart review pt report)  - c/w albuterol qd  - c/w home trelegy     CV  # Cardiogenic shock 2/2 HF exacerbation requiring IABP (11/1-)  - 11/1 LH: pLAD 100 % in-stent restenosis & mRCA, 100 %. PCWP 30  - 11/1 TTE: LV dilated. EF 32 %. Regional WMAs present, mod (grade 2) LV diastolic dysfunction  - 11/2 TTE: EF 22% and + LV thrombus   - Per echo areas of akinesis are not new compared to 2017, but EF is severely decreased  - Currently on IABP 1:1, successfully weaned, plan to remove today  - Titrate off nipride after IABP removal  - Hydralazine increased to 75 from 40 overnight and c/w isordil for AL reduction  - Trend CVP  - Daily CXR for IABP  - Keep right leg straight while sheaths/IABP/[place] are in position     # HFrEF w/ severely reduced EF 22%  - s/p lasix 40 11/1  - s/p lasix 80 11/6, c/w daily  - Start GDMT when stable  - CXR 11/7 clear   - Strict I&O, currently net negative  - Daily weights  - Fluid restriction  - On ozempic and dapaglifozin at home    # Stent re-occlusion of pLAD and 100%  of RCA  - CE cleared  - EKG here w/ LBBB  - c/w ASA, brillinta (takes plavix at home?)  - c/w lipitor 80  - c/w heparin gtt  - CT sx not recommending CABG, instead AT eval  - Viability study when IABP removed    # LV thrombus  - Seen on TTE 11/2  - c/w heparin gtt    # HTN  - Holding home losartan 25, HCTZ 25, coreg 25 for now    /RENAL  # ARIC  - Baseline Cr: 1-1.22, currently 1.48, stable  - Trend BMP, lytes and UOP  - Strict I/Os  - Avoid nephrotoxins    # Metabolic acidosis  - Likely 2/2 lactate of 3.7, due to cardiogenic shock  - Resolved    GI  # Transaminitis  - Likely iso cardiogenic shock  - AST/ALT: 94/50 on admission  - Improving, continue to trend    # GERD  - c/w home protonix  - Tolerating diet  - BM 11/6    # Bowel regimen  - miralax and senna d/c'd iso loose stools    ENDO  # Type 2 DM  - A1c 8.3  - c/w lantus and ISS  - -160s    HEME  # VTE prophylaxis   - H/H stable  - c/w heparin gtt    ID  # Concern for aspiration event prior to intubation  - Right sided opacification on CXR 11/2, clear today   - Last fever 11/4 (100.6)  - Blood cultures 11/2 and 11/4 NGTD  - UA 11/4 negative  - MRSA swab negative  - Continue to monitor off ABx (s/p vanco and cefepime 11/2)  - ID following    # COVID  - Maintain airborne precautions    GOC  - Full code  - Remain in ICU

## 2023-11-07 NOTE — PROGRESS NOTE ADULT - SUBJECTIVE AND OBJECTIVE BOX
LD VALENCIA  MRN-[id number]  Patient is a 58y old  Male who presents with a chief complaint of CP/SOB (2023 06:24)    HPI:  pt seen and approx 1:30 pm  in [facility]    59yo M w/ hx HTN, CAD w/ 1 stent in , ICH () presenting with abn ekg. Patient presented to [facility] where he was found to have STEMI, recommended to get cath however patient did not want to get it there so it left and came here.  Patient initially had cough, congestion, fever, was placed on antibiotics on .  Started feeling nauseous and had a presyncopal event after which he presented to ED last night.  Had chest pain as well.  Chest pain is midsternal.  Not currently having chest pain.  Received 4 aspirin 30 min pta. (2023 15:11)      24 HOUR EVENTS:    REVIEW OF SYSTEMS:    CONSTITUTIONAL: No weakness, fevers or chills  EYES/ENT: No visual changes;  No vertigo or throat pain   NECK: No pain or stiffness  RESPIRATORY: No cough, wheezing, hemoptysis; No shortness of breath  CARDIOVASCULAR: No chest pain or palpitations  GASTROINTESTINAL: No abdominal or epigastric pain. No nausea, vomiting, or hematemesis; No diarrhea or constipation. No melena or hematochezia.  GENITOURINARY: No dysuria, frequency or hematuria  NEUROLOGICAL: No numbness or weakness  SKIN: No itching, rashes      ICU Vital Signs Last 24 Hrs  T(C): 37.1 (2023 11:00), Max: 37.3 (2023 07:00)  T(F): 98.8 (2023 11:00), Max: 99.1 (2023 07:00)  HR: 85 (2023 18:45) (65 - 98)  BP: --  BP(mean): --  ABP: 99/56 (2023 18:45) (16/12 - 132/80)  ABP(mean): 69 (2023 18:45) (60 - 122)  RR: 24 (2023 18:45) (13 - 34)  SpO2: 93% (2023 18:45) (89% - 98%)    O2 Parameters below as of 2023 18:00  Patient On (Oxygen Delivery Method): room air            CVP(mm Hg): 15 (23 @ 10:00) (5 - 39)  CO: --  CI: --  PA: 38/24 (23 @ 10:00) (20/5 - 215/204)  PA(mean): 28 (23 @ 10:00) (11 - 207)  PA(direct): --  PCWP: --  LA: --  RA: --  SVR: --  SVRI: --  PVR: --  PVRI: --  I&O's Summary    2023 07:01  -  2023 07:00  --------------------------------------------------------  IN: 2008.1 mL / OUT: 3850 mL / NET: -1841.9 mL    2023 07:01  -  2023 19:17  --------------------------------------------------------  IN: 431.4 mL / OUT: 1150 mL / NET: -718.6 mL        CAPILLARY BLOOD GLUCOSE    CAPILLARY BLOOD GLUCOSE      POCT Blood Glucose.: 181 mg/dL (2023 17:34)      PHYSICAL EXAM:  GENERAL: No acute distress, well-developed  HEAD:  Atraumatic, Normocephalic  EYES: EOMI, PERRLA, conjunctiva and sclera clear  NECK: Supple, no lymphadenopathy, no JVD  CHEST/LUNG: CTAB; No wheezes, rales, or rhonchi  HEART: Regular rate and rhythm. Normal S1/S2. No murmurs, rubs, or gallops  ABDOMEN: Soft, non-tender, non-distended; normal bowel sounds, no organomegaly  EXTREMITIES:  2+ peripheral pulses b/l, No clubbing, cyanosis, or edema  NEUROLOGY: A&O x 3, no focal deficits  SKIN: No rashes or lesions    ============================I/O===========================   I&O's Detail    2023 07:01  -  2023 07:00  --------------------------------------------------------  IN:    Dexmedetomidine: 237.6 mL    Heparin: 328.5 mL    Nitroprusside: 685 mL    Nitroprusside: 37 mL    Oral Fluid: 720 mL  Total IN: 2008.1 mL    OUT:    Indwelling Catheter - Urethral (mL): 3850 mL  Total OUT: 3850 mL    Total NET: -1841.9 mL      2023 07:01  -  2023 19:17  --------------------------------------------------------  IN:    Dexmedetomidine: 230.4 mL    Heparin: 16 mL    Nitroprusside: 185 mL  Total IN: 431.4 mL    OUT:    Indwelling Catheter - Urethral (mL): 1150 mL  Total OUT: 1150 mL    Total NET: -718.6 mL        ============================ LABS =========================                        10.9   9.27  )-----------( 172      ( 2023 00:46 )             32.3     11-07    133<L>  |  100  |  27<H>  ----------------------------<  169<H>  3.9   |  18<L>  |  1.63<H>    Ca    9.0      2023 17:48  Phos  3.5       Mg     2.2         TPro  6.4  /  Alb  3.1<L>  /  TBili  0.7  /  DBili  x   /  AST  40  /  ALT  44  /  AlkPhos  41                  LIVER FUNCTIONS - ( 2023 17:48 )  Alb: 3.1 g/dL / Pro: 6.4 g/dL / ALK PHOS: 41 U/L / ALT: 44 U/L / AST: 40 U/L / GGT: x           PTT - ( 2023 00:46 )  PTT:62.9 sec  ABG - ( 2023 17:40 )  pH, Arterial: 7.45  pH, Blood: x     /  pCO2: 30    /  pO2: 84    / HCO3: 21    / Base Excess: -2.2  /  SaO2: 97.5              Blood Gas Arterial, Lactate: 0.7 mmol/L (23 @ 17:40)  Blood Gas Arterial, Lactate: 0.8 mmol/L (23 @ 12:50)  Blood Gas Venous - Lactate: 0.7 mmol/L (23 @ 00:37)  Blood Gas Arterial, Lactate: 0.7 mmol/L (23 @ 00:37)  Blood Gas Venous - Lactate: 0.8 mmol/L (23 @ 20:15)  Blood Gas Venous - Lactate: 0.9 mmol/L (23 @ 17:50)  Blood Gas Arterial, Lactate: 1.0 mmol/L (23 @ 03:17)  Blood Gas Arterial, Lactate: 0.9 mmol/L (23 @ 01:47)  Blood Gas Arterial, Lactate: 1.0 mmol/L (23 @ 22:10)  Blood Gas Venous - Lactate: 1.3 mmol/L (23 @ 16:43)  Blood Gas Arterial, Lactate: 1.2 mmol/L (23 @ 00:00)    Urinalysis Basic - ( 2023 17:48 )    Color: x / Appearance: x / SG: x / pH: x  Gluc: 169 mg/dL / Ketone: x  / Bili: x / Urobili: x   Blood: x / Protein: x / Nitrite: x   Leuk Esterase: x / RBC: x / WBC x   Sq Epi: x / Non Sq Epi: x / Bacteria: x      ======================Micro/Rad/Cardio=================  Telemetry: Reviewed   EKG: Reviewed  CXR: Reviewed  Culture: Reviewed   Echo:   Cath: Cardiac Cath Lab - Adult:   [facility]  Department of Cardiology  [address] [place] [address] [phone]  Cath Lab Report -- Comprehensive Report  Patient: LD VALENCIA  Study date: 2017  Account number: [id number]  MR number: [id number]  : 1965  Gender: Male  Race: W  Case Physician(s):  Jairon Holguin M.D.  Referring Physician:  Luc Lynn M.D.  INDICATIONS: Unstable angina - CCS4.  HISTORY: The patient has a history of coronary artery disease. The patient  hashypertension and medication-treated dyslipidemia.  PROCEDURE:  --  Left heart catheterization with ventriculography.  --  Left coronary angiography.  --  Right coronary angiography.  TECHNIQUE: The risks and alternatives of the procedures and conscious  sedation were explained to the patient and informed consent was obtained.  Cardiac catheterization performed electively.  Local anesthetic given. Right radial artery access. A 6FR PRELUDE KIT was  inserted in the vessel. Left heart catheterization. Ventriculography was  performed. A 5FR FR4.0 EXPO catheter was utilized. Left coronary artery  angiography. The vessel was injected utilizing a 5FR FL3.5 EXPO catheter.  Right coronary artery angiography. The vessel was injected utilizing a 5FR  FR4.0 EXPO catheter. RADIATION EXPOSURE: 1.1 min.  CONTRAST GIVEN: Omnipaque 55 ml.  MEDICATIONS GIVEN: Midazolam, 1 mg, IV. Fentanyl, 25 mcg, IV. Verapamil  (Isoptin, Calan, Covera), 2.5 mg, IA. Heparin, 3000 units, IA.  VENTRICLES: Global left ventricular function was moderately depressed. EF  estimated was 40 %.  CORONARY VESSELS: The coronary circulation is right dominant.  LM:   --  LM: Normal.  LAD:   --  Proximal LAD: There was a 50 % stenosis.  CX:   --  Circumflex: Normal.  RCA:   --  Mid RCA: There was a 40 % stenosis.  --  Distal RCA: There was a 50 % stenosis.  COMPLICATIONS: There were no complications.  DIAGNOSTIC RECOMMENDATIONS: The patient should continue with the present  medications.  Prepared and signed by  Jairon Holguin M.D.  Signed 2017 12:20:13  HEMODYNAMIC TABLES  Pressures:  Baseline/ Room Air  Pressures:  - HR: 78  Pressures:  - Rhythm:  Pressures:  -- Aortic Pressure (S/D/M): --/--/99  Pressures:  -- Left Ventricle (s/edp): 157/39/--  Outputs:  Baseline/ Room Air  Outputs:  -- CALCULATIONS[age]  Outputs:  -- CALCULATIONS: Body Surface Area: 2.05  Outputs:  -- CALCULATIONS: Height in cm: 175.00  Outputs:  -- CALCULATIONS: Sex: Male  Outputs:  -- CALCULATIONS: Weight in k.40 (17 @ 21:55)            LD VALENCIA  MRN-[id number]  Patient is a 58y old  Male who presents with a chief complaint of CP/SOB (2023 06:24)    HPI:  pt seen and approx 1:30 pm  in [facility]    59yo M w/ hx HTN, CAD w/ 1 stent in , ICH () presenting with abn ekg. Patient presented to [facility] where he was found to have STEMI, recommended to get cath however patient did not want to get it there so it left and came here.  Patient initially had cough, congestion, fever, was placed on antibiotics on .  Started feeling nauseous and had a presyncopal event after which he presented to ED last night.  Had chest pain as well.  Chest pain is midsternal.  Not currently having chest pain.  Received 4 aspirin 30 min pta. (2023 15:11)    24 HOUR EVENTS:  - IABP and swan discontinued today  - Creatinine increasing but LFTs and other perfusion markers stable  - Pending eventual cMRI    REVIEW OF SYSTEMS:  CONSTITUTIONAL: No weakness, fevers or chills  EYES/ENT: No visual changes;  No vertigo or throat pain   NECK: No pain or stiffness  RESPIRATORY: No cough, wheezing, hemoptysis; No shortness of breath  CARDIOVASCULAR: No chest pain or palpitations  GASTROINTESTINAL: No abdominal or epigastric pain. No nausea, vomiting, or hematemesis; No diarrhea or constipation. No melena or hematochezia.  GENITOURINARY: No dysuria, frequency or hematuria  NEUROLOGICAL: No numbness or weakness  SKIN: No itching, rashes      ICU Vital Signs Last 24 Hrs  T(C): 37.1 (2023 11:00), Max: 37.3 (2023 07:00)  T(F): 98.8 (2023 11:00), Max: 99.1 (2023 07:00)  HR: 85 (2023 18:45) (65 - 98)  BP: --  BP(mean): --  ABP: 99/56 (2023 18:45) (16/12 - 132/80)  ABP(mean): 69 (2023 18:45) (60 - 122)  RR: 24 (2023 18:45) (13 - 34)  SpO2: 93% (2023 18:45) (89% - 98%)    O2 Parameters below as of 2023 18:00  Patient On (Oxygen Delivery Method): room air            CVP(mm Hg): 15 (23 @ 10:00) (5 - 39)  CO: --  CI: --  PA: 38/24 (23 @ 10:00) (20/5 - 215/204)  PA(mean): 28 (23 @ 10:00) (11 - 207)  PA(direct): --  PCWP: --  LA: --  RA: --  SVR: --  SVRI: --  PVR: --  PVRI: --  I&O's Summary    2023 07:01  -  2023 07:00  --------------------------------------------------------  IN: 2008.1 mL / OUT: 3850 mL / NET: -1841.9 mL    2023 07:01  -  2023 19:17  --------------------------------------------------------  IN: 431.4 mL / OUT: 1150 mL / NET: -718.6 mL        CAPILLARY BLOOD GLUCOSE    CAPILLARY BLOOD GLUCOSE      POCT Blood Glucose.: 181 mg/dL (2023 17:34)      PHYSICAL EXAM:  GENERAL: No acute distress, well-developed  HEAD:  Atraumatic, Normocephalic  EYES: EOMI, PERRLA, conjunctiva and sclera clear  NECK: Supple, no lymphadenopathy, no JVD  CHEST/LUNG: CTAB; No wheezes, rales, or rhonchi  HEART: Regular rate and rhythm. Normal S1/S2. No murmurs, rubs, or gallops  ABDOMEN: Soft, non-tender, non-distended; normal bowel sounds, no organomegaly  EXTREMITIES:  2+ peripheral pulses b/l, No clubbing, cyanosis, or edema. Right fem soft, nontender, no evidence of hematoma  NEUROLOGY: A&O x 3, no focal deficits  SKIN: No rashes or lesions    ============================I/O===========================   I&O's Detail    2023 07:01  -  2023 07:00  --------------------------------------------------------  IN:    Dexmedetomidine: 237.6 mL    Heparin: 328.5 mL    Nitroprusside: 685 mL    Nitroprusside: 37 mL    Oral Fluid: 720 mL  Total IN: 2008.1 mL    OUT:    Indwelling Catheter - Urethral (mL): 3850 mL  Total OUT: 3850 mL    Total NET: -1841.9 mL      2023 07:01  -  2023 19:17  --------------------------------------------------------  IN:    Dexmedetomidine: 230.4 mL    Heparin: 16 mL    Nitroprusside: 185 mL  Total IN: 431.4 mL    OUT:    Indwelling Catheter - Urethral (mL): 1150 mL  Total OUT: 1150 mL    Total NET: -718.6 mL        ============================ LABS =========================                        10.9   9.27  )-----------( 172      ( 2023 00:46 )             32.3     11-07    133<L>  |  100  |  27<H>  ----------------------------<  169<H>  3.9   |  18<L>  |  1.63<H>    Ca    9.0      2023 17:48  Phos  3.5       Mg     2.2         TPro  6.4  /  Alb  3.1<L>  /  TBili  0.7  /  DBili  x   /  AST  40  /  ALT  44  /  AlkPhos  41                  LIVER FUNCTIONS - ( 2023 17:48 )  Alb: 3.1 g/dL / Pro: 6.4 g/dL / ALK PHOS: 41 U/L / ALT: 44 U/L / AST: 40 U/L / GGT: x           PTT - ( 2023 00:46 )  PTT:62.9 sec  ABG - ( 2023 17:40 )  pH, Arterial: 7.45  pH, Blood: x     /  pCO2: 30    /  pO2: 84    / HCO3: 21    / Base Excess: -2.2  /  SaO2: 97.5              Blood Gas Arterial, Lactate: 0.7 mmol/L (23 @ 17:40)  Blood Gas Arterial, Lactate: 0.8 mmol/L (23 @ 12:50)  Blood Gas Venous - Lactate: 0.7 mmol/L (23 @ 00:37)  Blood Gas Arterial, Lactate: 0.7 mmol/L (23 @ 00:37)  Blood Gas Venous - Lactate: 0.8 mmol/L (23 @ 20:15)  Blood Gas Venous - Lactate: 0.9 mmol/L (23 @ 17:50)  Blood Gas Arterial, Lactate: 1.0 mmol/L (23 @ 03:17)  Blood Gas Arterial, Lactate: 0.9 mmol/L (23 @ 01:47)  Blood Gas Arterial, Lactate: 1.0 mmol/L (23 @ 22:10)  Blood Gas Venous - Lactate: 1.3 mmol/L (23 @ 16:43)  Blood Gas Arterial, Lactate: 1.2 mmol/L (23 @ 00:00)    Urinalysis Basic - ( 2023 17:48 )    Color: x / Appearance: x / SG: x / pH: x  Gluc: 169 mg/dL / Ketone: x  / Bili: x / Urobili: x   Blood: x / Protein: x / Nitrite: x   Leuk Esterase: x / RBC: x / WBC x   Sq Epi: x / Non Sq Epi: x / Bacteria: x      ======================Micro/Rad/Cardio=================  Telemetry: Reviewed   EKG: Reviewed  CXR: Reviewed  Culture: Reviewed   Echo:   Cath: Cardiac Cath Lab - Adult:   [facility]  Department of Cardiology  [address] [place] [address]  [phone]  Cath Lab Report -- Comprehensive Report  Patient: LD VALENCIA  Study date: 2017  Account number: [id number]  MR number: [id number]  : 1965  Gender: Male  Race: W  Case Physician(s):  Jairon Holguin M.D.  Referring Physician:  Luc Lynn M.D.  INDICATIONS: Unstable angina - CCS4.  HISTORY: The patient has a history of coronary artery disease. The patient  hashypertension and medication-treated dyslipidemia.  PROCEDURE:  --  Left heart catheterization with ventriculography.  --  Left coronary angiography.  --  Right coronary angiography.  TECHNIQUE: The risks and alternatives of the procedures and conscious  sedation were explained to the patient and informed consent was obtained.  Cardiac catheterization performed electively.  Local anesthetic given. Right radial artery access. A 6FR PRELUDE KIT was  inserted in the vessel. Left heart catheterization. Ventriculography was  performed. A 5FR FR4.0 EXPO catheter was utilized. Left coronary artery  angiography. The vessel was injected utilizing a 5FR FL3.5 EXPO catheter.  Right coronary artery angiography. The vessel was injected utilizing a 5FR  FR4.0 EXPO catheter. RADIATION EXPOSURE: 1.1 min.  CONTRAST GIVEN: Omnipaque 55 ml.  MEDICATIONS GIVEN: Midazolam, 1 mg, IV. Fentanyl, 25 mcg, IV. Verapamil  (Isoptin, Calan, Covera), 2.5 mg, IA. Heparin, 3000 units, IA.  VENTRICLES: Global left ventricular function was moderately depressed. EF  estimated was 40 %.  CORONARY VESSELS: The coronary circulation is right dominant.  LM:   --  LM: Normal.  LAD:   --  Proximal LAD: There was a 50 % stenosis.  CX:   --  Circumflex: Normal.  RCA:   --  Mid RCA: There was a 40 % stenosis.  --  Distal RCA: There was a 50 % stenosis.  COMPLICATIONS: There were no complications.  DIAGNOSTIC RECOMMENDATIONS: The patient should continue with the present  medications.  Prepared and signed by  Jairon Holguin M.D.  Signed 2017 12:20:13  HEMODYNAMIC TABLES  Pressures:  Baseline/ Room Air  Pressures:  - HR: 78  Pressures:  - Rhythm:  Pressures:  -- Aortic Pressure (S/D/M): --/--/99  Pressures:  -- Left Ventricle (s/edp): 157/39/--  Outputs:  Baseline/ Room Air  Outputs:  -- CALCULATIONS[age]  Outputs:  -- CALCULATIONS: Body Surface Area: 2.05  Outputs:  -- CALCULATIONS: Height in cm: 175.00  Outputs:  -- CALCULATIONS: Sex: Male  Outputs:  -- CALCULATIONS: Weight in k.40 (17 @ 21:55)

## 2023-11-07 NOTE — PROGRESS NOTE ADULT - SUBJECTIVE AND OBJECTIVE BOX
LD VALENCIA  58y Male  MRN:[id number]    Patient is a 58y old  Male who presents with a chief complaint of NSTEMI (01 Nov 2023 20:29)    HPI:  57yo M w/ hx HTN, CAD w/ 1 stent in 2009, ICH (2008) presenting with abn ekg. Patient presented to [facility] where he was found to have STEMI, recommended to get cath however patient did not want to get it there so it left and came here.  Patient initially had cough, congestion, fever, was placed on antibiotics on Sunday.  Started feeling nauseous and had a presyncopal event after which he presented to ED last night.  Had chest pain as well.  Chest pain is midsternal.  Not currently having chest pain.  Received 4 aspirin 30 min pta. (01 Nov 2023 15:11)      Patient seen and evaluated at bedside in CCU. interval events noted    Interval HPI: IABP removed     PAST MEDICAL & SURGICAL HISTORY:  HTN (hypertension)      CAD (coronary artery disease)  2009; stent      Intracranial hemorrhage  2008      Respiratory arrest  december 1st      Myocardial infarction, unspecified MI type, unspecified artery      History of coronary artery stent placement          REVIEW OF SYSTEMS:  as per hpi     VITALS:   ICU Vital Signs Last 24 Hrs  T(C): 37.3 (07 Nov 2023 07:00), Max: 37.3 (07 Nov 2023 07:00)  T(F): 99.1 (07 Nov 2023 07:00), Max: 99.1 (07 Nov 2023 07:00)  HR: 84 (07 Nov 2023 12:30) (80 - 98)  BP: --  BP(mean): --  ABP: 84/51 (07 Nov 2023 12:15) (16/12 - 132/80)  ABP(mean): 61 (07 Nov 2023 12:15) (61 - 122)  RR: 21 (07 Nov 2023 12:30) (13 - 34)  SpO2: 92% (07 Nov 2023 12:30) (88% - 98%)    O2 Parameters below as of 07 Nov 2023 10:00  Patient On (Oxygen Delivery Method): room air            PHYSICAL EXAM:  GENERAL: NAD, well-developed  HEAD:  Atraumatic, Normocephalic  EYES: EOMI, PERRLA, conjunctiva and sclera clear  NECK: Supple, No JVD  CHEST/LUNG: Clear to auscultation bilaterally; No wheeze  HEART: Regular rate and rhythm; No murmurs, rubs, or gallops  ABDOMEN: Soft, Nontender, Nondistended; Bowel sounds present  EXTREMITIES:  2+ Peripheral Pulses, No clubbing, cyanosis, or edema  PSYCH: AAOx3  NEUROLOGY: non-focal  SKIN: No rashes or lesions    Consultant(s) Notes Reviewed:  [x ] YES  [ ] NO  Care Discussed with Consultants/Other Providers [ x] YES  [ ] NO    MEDS:   MEDICATIONS  (STANDING):  albuterol    0.083% 2.5 milliGRAM(s) Nebulizer daily  aspirin  chewable 81 milliGRAM(s) Oral daily  atorvastatin 80 milliGRAM(s) Oral at bedtime  budesonide  80 MICROgram(s)/formoterol 4.5 MICROgram(s) Inhaler 2 Puff(s) Inhalation two times a day  chlorhexidine 2% Cloths 1 Application(s) Topical daily  dexMEDEtomidine Infusion 0.5 MICROgram(s)/kG/Hr (12.4 mL/Hr) IV Continuous <Continuous>  dextrose 50% Injectable 25 Gram(s) IV Push once  dextrose 50% Injectable 12.5 Gram(s) IV Push once  dextrose 50% Injectable 25 Gram(s) IV Push once  furosemide   Injectable 80 milliGRAM(s) IV Push daily  glucagon  Injectable 1 milliGRAM(s) IntraMuscular once  hydrALAZINE 75 milliGRAM(s) Oral every 8 hours  insulin glargine Injectable (LANTUS) 8 Unit(s) SubCutaneous at bedtime  insulin lispro (ADMELOG) corrective regimen sliding scale   SubCutaneous three times a day before meals  insulin lispro (ADMELOG) corrective regimen sliding scale   SubCutaneous at bedtime  isosorbide   dinitrate Tablet (ISORDIL) 30 milliGRAM(s) Oral three times a day  LORazepam     Tablet 0.5 milliGRAM(s) Oral every 6 hours  nitroprusside Infusion 0.5 MICROgram(s)/kG/Min (7.42 mL/Hr) IV Continuous <Continuous>  nystatin    Suspension 617361 Unit(s) Oral every 6 hours  pantoprazole    Tablet 40 milliGRAM(s) Oral before breakfast  sodium chloride 0.65% Nasal 1 Spray(s) Both Nostrils three times a day  spironolactone 25 milliGRAM(s) Oral daily  ticagrelor 90 milliGRAM(s) Oral every 12 hours  tiotropium 2.5 MICROgram(s) Inhaler 2 Puff(s) Inhalation daily    MEDICATIONS  (PRN):  dextrose Oral Gel 15 Gram(s) Oral once PRN Blood Glucose LESS THAN 70 milliGRAM(s)/deciliter      ALLERGIES:  penicillins (Unknown)      LABS:                                                                         10.9   9.27  )-----------( 172      ( 07 Nov 2023 00:46 )             32.3   11-07    131<L>  |  100  |  24<H>  ----------------------------<  144<H>  3.5   |  19<L>  |  1.48<H>    Ca    8.7      07 Nov 2023 00:46  Phos  3.3     11-07  Mg     2.1     11-07    TPro  6.1  /  Alb  3.0<L>  /  TBili  0.6  /  DBili  x   /  AST  53<H>  /  ALT  50<H>  /  AlkPhos  41  11-07         < from: Xray Chest 1 View- PORTABLE-Urgent (11.01.23 @ 07:42) >    IMPRESSION:  Clear lungs.    ---End of Report ---        < end of copied text >  < from: TTE W or WO Ultrasound Enhancing Agent (11.01.23 @ 10:23) >  _____________________________     CONCLUSIONS:      1. Left ventricular cavity is moderately dilated. Left ventricular wall thickness is normal. Left ventricular systolic function is severely decreased with an ejection fraction of 32 % by Chinchilla's method of disks. Regional wall motion abnormalities present.   2. Multiple segmental abnormalities exist. See findings.   3. There is moderate (grade 2) left ventricular diastolic dysfunction, with indeterminant filling pressure.   4. Normal right ventricular cavity size, wall thickness, and systolic function.   5. No significant valvular disease.   6. No pericardial effusion seen.   7. Compared to the transthoracic echocardiogram performed on 1/25/2017 the areas of akinesis are unchanged but there has been a decline in LV systolic function with new areas of hypokinesis.    __________________________________________________________________    < end of copied text >  < from: TTE Limited W or WO Ultrasound Enhancing Agent (11.02.23 @ 07:41) >  __________________________     CONCLUSIONS:      1. After obtaining consent, Definity ultrasound enhancing agent was given for enhanced left ventricular opacification and improved delineation of the left ventricular endocardial borders. Left ventricular systolic function is severely decreased with a calculated ejection fraction of 22 % by the Chinchilla's biplane method of disks. There is a left ventricular thrombus.   2. Findings were discussed with Litzy BOSS on 11/2/2023 at 8.49am.   3. There is a left ventricular thrombus.    _________________________________________________________________    < end of copied text >

## 2023-11-08 LAB
ALBUMIN SERPL ELPH-MCNC: 3.2 G/DL — LOW (ref 3.3–5)
ALBUMIN SERPL ELPH-MCNC: 3.2 G/DL — LOW (ref 3.3–5)
ALP SERPL-CCNC: 46 U/L — SIGNIFICANT CHANGE UP (ref 40–120)
ALP SERPL-CCNC: 46 U/L — SIGNIFICANT CHANGE UP (ref 40–120)
ALT FLD-CCNC: 48 U/L — HIGH (ref 10–45)
ALT FLD-CCNC: 48 U/L — HIGH (ref 10–45)
ANION GAP SERPL CALC-SCNC: 12 MMOL/L — SIGNIFICANT CHANGE UP (ref 5–17)
ANION GAP SERPL CALC-SCNC: 12 MMOL/L — SIGNIFICANT CHANGE UP (ref 5–17)
APTT BLD: 69.2 SEC — HIGH (ref 24.5–35.6)
APTT BLD: 69.2 SEC — HIGH (ref 24.5–35.6)
APTT BLD: 80.6 SEC — HIGH (ref 24.5–35.6)
APTT BLD: 80.6 SEC — HIGH (ref 24.5–35.6)
AST SERPL-CCNC: 37 U/L — SIGNIFICANT CHANGE UP (ref 10–40)
AST SERPL-CCNC: 37 U/L — SIGNIFICANT CHANGE UP (ref 10–40)
BASOPHILS # BLD AUTO: 0.01 K/UL — SIGNIFICANT CHANGE UP (ref 0–0.2)
BASOPHILS # BLD AUTO: 0.01 K/UL — SIGNIFICANT CHANGE UP (ref 0–0.2)
BASOPHILS NFR BLD AUTO: 0.1 % — SIGNIFICANT CHANGE UP (ref 0–2)
BASOPHILS NFR BLD AUTO: 0.1 % — SIGNIFICANT CHANGE UP (ref 0–2)
BILIRUB SERPL-MCNC: 0.8 MG/DL — SIGNIFICANT CHANGE UP (ref 0.2–1.2)
BILIRUB SERPL-MCNC: 0.8 MG/DL — SIGNIFICANT CHANGE UP (ref 0.2–1.2)
BUN SERPL-MCNC: 26 MG/DL — HIGH (ref 7–23)
BUN SERPL-MCNC: 26 MG/DL — HIGH (ref 7–23)
CALCIUM SERPL-MCNC: 9.2 MG/DL — SIGNIFICANT CHANGE UP (ref 8.4–10.5)
CALCIUM SERPL-MCNC: 9.2 MG/DL — SIGNIFICANT CHANGE UP (ref 8.4–10.5)
CHLORIDE SERPL-SCNC: 102 MMOL/L — SIGNIFICANT CHANGE UP (ref 96–108)
CHLORIDE SERPL-SCNC: 102 MMOL/L — SIGNIFICANT CHANGE UP (ref 96–108)
CO2 SERPL-SCNC: 17 MMOL/L — LOW (ref 22–31)
CO2 SERPL-SCNC: 17 MMOL/L — LOW (ref 22–31)
CREAT SERPL-MCNC: 1.41 MG/DL — HIGH (ref 0.5–1.3)
CREAT SERPL-MCNC: 1.41 MG/DL — HIGH (ref 0.5–1.3)
EGFR: 57 ML/MIN/1.73M2 — LOW
EGFR: 57 ML/MIN/1.73M2 — LOW
EOSINOPHIL # BLD AUTO: 0.22 K/UL — SIGNIFICANT CHANGE UP (ref 0–0.5)
EOSINOPHIL # BLD AUTO: 0.22 K/UL — SIGNIFICANT CHANGE UP (ref 0–0.5)
EOSINOPHIL NFR BLD AUTO: 2.7 % — SIGNIFICANT CHANGE UP (ref 0–6)
EOSINOPHIL NFR BLD AUTO: 2.7 % — SIGNIFICANT CHANGE UP (ref 0–6)
GAS PNL BLDA: SIGNIFICANT CHANGE UP
GAS PNL BLDA: SIGNIFICANT CHANGE UP
GLUCOSE BLDC GLUCOMTR-MCNC: 133 MG/DL — HIGH (ref 70–99)
GLUCOSE BLDC GLUCOMTR-MCNC: 133 MG/DL — HIGH (ref 70–99)
GLUCOSE BLDC GLUCOMTR-MCNC: 134 MG/DL — HIGH (ref 70–99)
GLUCOSE BLDC GLUCOMTR-MCNC: 134 MG/DL — HIGH (ref 70–99)
GLUCOSE BLDC GLUCOMTR-MCNC: 146 MG/DL — HIGH (ref 70–99)
GLUCOSE BLDC GLUCOMTR-MCNC: 146 MG/DL — HIGH (ref 70–99)
GLUCOSE BLDC GLUCOMTR-MCNC: 166 MG/DL — HIGH (ref 70–99)
GLUCOSE BLDC GLUCOMTR-MCNC: 166 MG/DL — HIGH (ref 70–99)
GLUCOSE BLDC GLUCOMTR-MCNC: 209 MG/DL — HIGH (ref 70–99)
GLUCOSE BLDC GLUCOMTR-MCNC: 209 MG/DL — HIGH (ref 70–99)
GLUCOSE SERPL-MCNC: 139 MG/DL — HIGH (ref 70–99)
GLUCOSE SERPL-MCNC: 139 MG/DL — HIGH (ref 70–99)
HCT VFR BLD CALC: 34.4 % — LOW (ref 39–50)
HCT VFR BLD CALC: 34.4 % — LOW (ref 39–50)
HGB BLD-MCNC: 11.5 G/DL — LOW (ref 13–17)
HGB BLD-MCNC: 11.5 G/DL — LOW (ref 13–17)
IMM GRANULOCYTES NFR BLD AUTO: 0.7 % — SIGNIFICANT CHANGE UP (ref 0–0.9)
IMM GRANULOCYTES NFR BLD AUTO: 0.7 % — SIGNIFICANT CHANGE UP (ref 0–0.9)
INR BLD: 1.11 RATIO — SIGNIFICANT CHANGE UP (ref 0.85–1.18)
INR BLD: 1.11 RATIO — SIGNIFICANT CHANGE UP (ref 0.85–1.18)
LYMPHOCYTES # BLD AUTO: 1.47 K/UL — SIGNIFICANT CHANGE UP (ref 1–3.3)
LYMPHOCYTES # BLD AUTO: 1.47 K/UL — SIGNIFICANT CHANGE UP (ref 1–3.3)
LYMPHOCYTES # BLD AUTO: 18.3 % — SIGNIFICANT CHANGE UP (ref 13–44)
LYMPHOCYTES # BLD AUTO: 18.3 % — SIGNIFICANT CHANGE UP (ref 13–44)
MAGNESIUM SERPL-MCNC: 2.2 MG/DL — SIGNIFICANT CHANGE UP (ref 1.6–2.6)
MAGNESIUM SERPL-MCNC: 2.2 MG/DL — SIGNIFICANT CHANGE UP (ref 1.6–2.6)
MCHC RBC-ENTMCNC: 28.3 PG — SIGNIFICANT CHANGE UP (ref 27–34)
MCHC RBC-ENTMCNC: 28.3 PG — SIGNIFICANT CHANGE UP (ref 27–34)
MCHC RBC-ENTMCNC: 33.4 GM/DL — SIGNIFICANT CHANGE UP (ref 32–36)
MCHC RBC-ENTMCNC: 33.4 GM/DL — SIGNIFICANT CHANGE UP (ref 32–36)
MCV RBC AUTO: 84.5 FL — SIGNIFICANT CHANGE UP (ref 80–100)
MCV RBC AUTO: 84.5 FL — SIGNIFICANT CHANGE UP (ref 80–100)
MONOCYTES # BLD AUTO: 0.89 K/UL — SIGNIFICANT CHANGE UP (ref 0–0.9)
MONOCYTES # BLD AUTO: 0.89 K/UL — SIGNIFICANT CHANGE UP (ref 0–0.9)
MONOCYTES NFR BLD AUTO: 11.1 % — SIGNIFICANT CHANGE UP (ref 2–14)
MONOCYTES NFR BLD AUTO: 11.1 % — SIGNIFICANT CHANGE UP (ref 2–14)
NEUTROPHILS # BLD AUTO: 5.4 K/UL — SIGNIFICANT CHANGE UP (ref 1.8–7.4)
NEUTROPHILS # BLD AUTO: 5.4 K/UL — SIGNIFICANT CHANGE UP (ref 1.8–7.4)
NEUTROPHILS NFR BLD AUTO: 67.1 % — SIGNIFICANT CHANGE UP (ref 43–77)
NEUTROPHILS NFR BLD AUTO: 67.1 % — SIGNIFICANT CHANGE UP (ref 43–77)
NRBC # BLD: 0 /100 WBCS — SIGNIFICANT CHANGE UP (ref 0–0)
NRBC # BLD: 0 /100 WBCS — SIGNIFICANT CHANGE UP (ref 0–0)
PHOSPHATE SERPL-MCNC: 3.8 MG/DL — SIGNIFICANT CHANGE UP (ref 2.5–4.5)
PHOSPHATE SERPL-MCNC: 3.8 MG/DL — SIGNIFICANT CHANGE UP (ref 2.5–4.5)
PLATELET # BLD AUTO: 212 K/UL — SIGNIFICANT CHANGE UP (ref 150–400)
PLATELET # BLD AUTO: 212 K/UL — SIGNIFICANT CHANGE UP (ref 150–400)
POTASSIUM SERPL-MCNC: 4.3 MMOL/L — SIGNIFICANT CHANGE UP (ref 3.5–5.3)
POTASSIUM SERPL-MCNC: 4.3 MMOL/L — SIGNIFICANT CHANGE UP (ref 3.5–5.3)
POTASSIUM SERPL-SCNC: 4.3 MMOL/L — SIGNIFICANT CHANGE UP (ref 3.5–5.3)
POTASSIUM SERPL-SCNC: 4.3 MMOL/L — SIGNIFICANT CHANGE UP (ref 3.5–5.3)
PROT SERPL-MCNC: 6.7 G/DL — SIGNIFICANT CHANGE UP (ref 6–8.3)
PROT SERPL-MCNC: 6.7 G/DL — SIGNIFICANT CHANGE UP (ref 6–8.3)
PROTHROM AB SERPL-ACNC: 12.2 SEC — SIGNIFICANT CHANGE UP (ref 9.5–13)
PROTHROM AB SERPL-ACNC: 12.2 SEC — SIGNIFICANT CHANGE UP (ref 9.5–13)
RBC # BLD: 4.07 M/UL — LOW (ref 4.2–5.8)
RBC # BLD: 4.07 M/UL — LOW (ref 4.2–5.8)
RBC # FLD: 14.6 % — HIGH (ref 10.3–14.5)
RBC # FLD: 14.6 % — HIGH (ref 10.3–14.5)
SODIUM SERPL-SCNC: 131 MMOL/L — LOW (ref 135–145)
SODIUM SERPL-SCNC: 131 MMOL/L — LOW (ref 135–145)
WBC # BLD: 8.05 K/UL — SIGNIFICANT CHANGE UP (ref 3.8–10.5)
WBC # BLD: 8.05 K/UL — SIGNIFICANT CHANGE UP (ref 3.8–10.5)
WBC # FLD AUTO: 8.05 K/UL — SIGNIFICANT CHANGE UP (ref 3.8–10.5)
WBC # FLD AUTO: 8.05 K/UL — SIGNIFICANT CHANGE UP (ref 3.8–10.5)

## 2023-11-08 PROCEDURE — 99292 CRITICAL CARE ADDL 30 MIN: CPT

## 2023-11-08 PROCEDURE — 99291 CRITICAL CARE FIRST HOUR: CPT | Mod: 25

## 2023-11-08 PROCEDURE — 93010 ELECTROCARDIOGRAM REPORT: CPT

## 2023-11-08 PROCEDURE — 99221 1ST HOSP IP/OBS SF/LOW 40: CPT

## 2023-11-08 PROCEDURE — 71045 X-RAY EXAM CHEST 1 VIEW: CPT | Mod: 26

## 2023-11-08 PROCEDURE — 99233 SBSQ HOSP IP/OBS HIGH 50: CPT

## 2023-11-08 RX ORDER — LIDOCAINE HCL 20 MG/ML
0.5 VIAL (ML) INJECTION
Qty: 2 | Refills: 0 | Status: DISCONTINUED | OUTPATIENT
Start: 2023-11-08 | End: 2023-11-13

## 2023-11-08 RX ORDER — HYDRALAZINE HCL 50 MG
100 TABLET ORAL EVERY 8 HOURS
Refills: 0 | Status: DISCONTINUED | OUTPATIENT
Start: 2023-11-08 | End: 2023-11-09

## 2023-11-08 RX ORDER — QUETIAPINE FUMARATE 200 MG/1
25 TABLET, FILM COATED ORAL DAILY
Refills: 0 | Status: DISCONTINUED | OUTPATIENT
Start: 2023-11-08 | End: 2023-11-09

## 2023-11-08 RX ORDER — LOPERAMIDE HCL 2 MG
2 TABLET ORAL ONCE
Refills: 0 | Status: COMPLETED | OUTPATIENT
Start: 2023-11-08 | End: 2023-11-08

## 2023-11-08 RX ORDER — PROPOFOL 10 MG/ML
20 INJECTION, EMULSION INTRAVENOUS
Qty: 1000 | Refills: 0 | Status: DISCONTINUED | OUTPATIENT
Start: 2023-11-08 | End: 2023-11-10

## 2023-11-08 RX ORDER — AMIODARONE HYDROCHLORIDE 400 MG/1
1 TABLET ORAL
Qty: 450 | Refills: 0 | Status: DISCONTINUED | OUTPATIENT
Start: 2023-11-08 | End: 2023-11-09

## 2023-11-08 RX ADMIN — Medication 2 MILLIGRAM(S): at 15:12

## 2023-11-08 RX ADMIN — Medication 81 MILLIGRAM(S): at 12:18

## 2023-11-08 RX ADMIN — ISOSORBIDE DINITRATE 30 MILLIGRAM(S): 5 TABLET ORAL at 05:32

## 2023-11-08 RX ADMIN — TICAGRELOR 90 MILLIGRAM(S): 90 TABLET ORAL at 05:33

## 2023-11-08 RX ADMIN — Medication 80 MILLIGRAM(S): at 05:32

## 2023-11-08 RX ADMIN — Medication 1 SPRAY(S): at 21:24

## 2023-11-08 RX ADMIN — ISOSORBIDE DINITRATE 30 MILLIGRAM(S): 5 TABLET ORAL at 12:17

## 2023-11-08 RX ADMIN — Medication 100 MILLIGRAM(S): at 21:24

## 2023-11-08 RX ADMIN — ISOSORBIDE DINITRATE 30 MILLIGRAM(S): 5 TABLET ORAL at 18:20

## 2023-11-08 RX ADMIN — Medication 500000 UNIT(S): at 12:18

## 2023-11-08 RX ADMIN — ATORVASTATIN CALCIUM 80 MILLIGRAM(S): 80 TABLET, FILM COATED ORAL at 21:24

## 2023-11-08 RX ADMIN — ALBUTEROL 2.5 MILLIGRAM(S): 90 AEROSOL, METERED ORAL at 08:26

## 2023-11-08 RX ADMIN — BUDESONIDE AND FORMOTEROL FUMARATE DIHYDRATE 2 PUFF(S): 160; 4.5 AEROSOL RESPIRATORY (INHALATION) at 21:24

## 2023-11-08 RX ADMIN — Medication 1 SPRAY(S): at 05:33

## 2023-11-08 RX ADMIN — BUDESONIDE AND FORMOTEROL FUMARATE DIHYDRATE 2 PUFF(S): 160; 4.5 AEROSOL RESPIRATORY (INHALATION) at 08:26

## 2023-11-08 RX ADMIN — Medication 100 MILLIGRAM(S): at 05:32

## 2023-11-08 RX ADMIN — INSULIN GLARGINE 8 UNIT(S): 100 INJECTION, SOLUTION SUBCUTANEOUS at 22:11

## 2023-11-08 RX ADMIN — CHLORHEXIDINE GLUCONATE 1 APPLICATION(S): 213 SOLUTION TOPICAL at 21:15

## 2023-11-08 RX ADMIN — Medication 0.5 MILLIGRAM(S): at 12:17

## 2023-11-08 RX ADMIN — QUETIAPINE FUMARATE 25 MILLIGRAM(S): 200 TABLET, FILM COATED ORAL at 15:12

## 2023-11-08 RX ADMIN — TICAGRELOR 90 MILLIGRAM(S): 90 TABLET ORAL at 18:19

## 2023-11-08 RX ADMIN — Medication 500000 UNIT(S): at 05:33

## 2023-11-08 RX ADMIN — Medication 2: at 09:03

## 2023-11-08 RX ADMIN — SPIRONOLACTONE 25 MILLIGRAM(S): 25 TABLET, FILM COATED ORAL at 05:33

## 2023-11-08 RX ADMIN — Medication 0.5 MILLIGRAM(S): at 05:32

## 2023-11-08 NOTE — PROGRESS NOTE ADULT - SUBJECTIVE AND OBJECTIVE BOX
Called to patient's bedside for intubation.  Therapy initiated by primary medical team.    Patient Assessment: Pt pulseless, CPR in progress by primary team with ambu bag ventilation.    Baseline Vital Signs:  BP:   HR:   RR:   SpO2:     Medications Administered:    Intubation:      [x ] Direct Laryngoscopy    [ ] Video-Assisted Laryngoscopy   [ ] Fiberoptic Intubation    Blade:   Cormack-Lehane View: [ ] 1     [ x] 2A     [ ] 2B     [ ] 3     [ ]  4  ETT Size:8.0  Marking at Teeth:23cm    Post-Intubation Vital Signs:  BP:  HR:  RR:   SpO2:     Positive end-tidal carbon dioxide via EasyCap, positive bilateral breath sounds.  CXR to be reviewed by primary team.  Atraumatic intubation with no complications.

## 2023-11-08 NOTE — PROGRESS NOTE ADULT - ASSESSMENT
59 yo male h/o htn, cad s/p pci, ICH, here with NSTEMI  s/p intubation and cath. now in CCU    NSTEMI  s/p  cath  cath results noted. multi vessel dz., CTSx f/u.   pending viability study  hep gtt  IABP now removed     resp failure  s/p intubation  now extubated   stable    LV thrombus   hep gtt    acute on chronic systolic heart failure  heart failure team f/u  s/p IABP  diuresis    pna  recently diagnosed outpt  iv abx now stopped  f/u cult   id following     covid  supportive care    h/o ICH  resolved    agitation  psy consult pending    mngt as per CCU team  d/w CCU attn    d/w pts pmd        Advanced care planning was discussed with patient and family.  Advanced care planning forms were reviewed and discussed as appropriate.  Differential diagnosis and plan of care discussed with patient after the evaluation.   Pain assessed and judicious use of narcotics when appropriate was discussed.  Importance of Fall prevention discussed.  Counseling on Smoking and Alcohol cessation was offered when appropriate.  Counseling on Diet, exercise, and medication compliance was done.       Approx 75 minutes spent.

## 2023-11-08 NOTE — PROGRESS NOTE ADULT - ASSESSMENT
59 yo M with HTN, CAD (s/p PCI 2008), HFrEF (EF severely reduced 2018) not on GDMT, CVA 2018 (requiring tpA), DM presenting with c/o SOB for a week before presenting to the hospital. He had been treated for PNA. He was admitted with c/o chest pressure. Per family, patient passed out, EMS was called and reportedly defibrillated the patient. He was taken to [facility] where he was found to have ACS but he left AMA and came to [facility].    On arrival, ECG showed ST depression in lateral leads, no c/o chest pain. Pro-BNP 4k. Patient was intubated prior to LCHC on 11/1 for respiratory failure. LHC 11/1: pLAD 70 % stenosis in the ostium portion of the segment and 100 % in-stent restenosis. mRCA, 100 % stenosis. RA 23, PA 51/33, wedge 31, PA sat 54%, CI 1.5. S/p IABP on 11/1. He was successfully extubated to nasal canula on 11/3 and was also noted to be COVID positive currently with persistent fevers. S/p IABP for hemodynamic support in the setting of possible sepsis.       Cardiac Studies  11/1/23  [facility] showed pLAD 70 % stenosis in the ostium portion of the segment and 100 % in-stent restenosis and in mRCA, 100 % stenosis.   11/1/23 RHC; RA 23 Vw 24, PA 52/33/40, PCWP 30 Vw 33, AO 85/66/73, PA sat 54.4%, CO/CI (F) 2.96/1.44, IABP was placed during the cath through R common femoral artery.   11/1/23 TTE: LVIDd 6.4cm , LVEF 32%, regional WMA, grade II DD,  TAPSE 1.7cm, mld MR, trace TR,     Bedside hemodynamics  11/3: IABP 1:1 RA 5; PA 27/12/18; CO/CI 5.6/2.6; MAP 90-100s.  11/4/23 IABP 1:3 CVP 4 PA 37/18 SvO2 83.8 CO/CI 11.2 CI 5.1  (in the setting of fever to 38.3C)  11/5 IABP 1:1 CVP 3 PA 48/27 SvO2 78.4% CO 8.2 CI 3.7     11/7 IABP removed

## 2023-11-08 NOTE — PROGRESS NOTE ADULT - SUBJECTIVE AND OBJECTIVE BOX
PATIENT:  LD VALENCIA  [id number]    CHIEF COMPLAINT:  Patient is a 58y old  Male who presents with a chief complaint of CP/SOB (07 Nov 2023 06:24)      INTERVAL HISTORYOVERNIGHT EVENTS:      REVIEW OF SYSTEMS:    Constitutional:     [ ] negative [ ] fevers [ ] chills [ ] weight loss [ ] weight gain  HEENT:                  [ ] negative [ ] dry eyes [ ] eye irritation [ ] postnasal drip [ ] nasal congestion  CV:                         [ ] negative  [ ] chest pain [ ] orthopnea [ ] palpitations [ ] murmur  Resp:                     [ ] negative [ ] cough [ ] shortness of breath [ ] dyspnea [ ] wheezing [ ] sputum [ ] hemoptysis  GI:                          [ ] negative [ ] nausea [ ] vomiting [ ] diarrhea [ ] constipation [ ] abd pain [ ] dysphagia   :                        [ ] negative [ ] dysuria [ ] nocturia [ ] hematuria [ ] increased urinary frequency  Musculoskeletal: [ ] negative [ ] back pain [ ] myalgias [ ] arthralgias [ ] fracture  Skin:                       [ ] negative [ ] rash [ ] itch  Neurological:        [ ] negative [ ] headache [ ] dizziness [ ] syncope [ ] weakness [ ] numbness  Psychiatric:           [ ] negative [ ] anxiety [ ] depression  Endocrine:            [ ] negative [ ] diabetes [ ] thyroid problem  Heme/Lymph:      [ ] negative [ ] anemia [ ] bleeding problem  Allergic/Immune: [ ] negative [ ] itchy eyes [ ] nasal discharge [ ] hives [ ] angioedema    [ ] All other systems negative  [ ] Unable to assess ROS because ________.    MEDICATIONS:  MEDICATIONS  (STANDING):  albuterol    0.083% 2.5 milliGRAM(s) Nebulizer daily  aspirin  chewable 81 milliGRAM(s) Oral daily  atorvastatin 80 milliGRAM(s) Oral at bedtime  budesonide  80 MICROgram(s)/formoterol 4.5 MICROgram(s) Inhaler 2 Puff(s) Inhalation two times a day  chlorhexidine 2% Cloths 1 Application(s) Topical daily  dexMEDEtomidine Infusion 0.5 MICROgram(s)/kG/Hr (12.4 mL/Hr) IV Continuous <Continuous>  dextrose 50% Injectable 25 Gram(s) IV Push once  dextrose 50% Injectable 12.5 Gram(s) IV Push once  dextrose 50% Injectable 25 Gram(s) IV Push once  furosemide   Injectable 80 milliGRAM(s) IV Push daily  glucagon  Injectable 1 milliGRAM(s) IntraMuscular once  heparin  Infusion 1600 Unit(s)/Hr (17 mL/Hr) IV Continuous <Continuous>  hydrALAZINE 100 milliGRAM(s) Oral every 8 hours  insulin glargine Injectable (LANTUS) 8 Unit(s) SubCutaneous at bedtime  insulin lispro (ADMELOG) corrective regimen sliding scale   SubCutaneous three times a day before meals  insulin lispro (ADMELOG) corrective regimen sliding scale   SubCutaneous at bedtime  isosorbide   dinitrate Tablet (ISORDIL) 30 milliGRAM(s) Oral three times a day  LORazepam     Tablet 0.5 milliGRAM(s) Oral every 6 hours  nitroprusside Infusion 0.5 MICROgram(s)/kG/Min (7.42 mL/Hr) IV Continuous <Continuous>  nystatin    Suspension 225131 Unit(s) Oral every 6 hours  pantoprazole    Tablet 40 milliGRAM(s) Oral before breakfast  sodium chloride 0.65% Nasal 1 Spray(s) Both Nostrils three times a day  spironolactone 25 milliGRAM(s) Oral daily  ticagrelor 90 milliGRAM(s) Oral every 12 hours  tiotropium 2.5 MICROgram(s) Inhaler 2 Puff(s) Inhalation daily    MEDICATIONS  (PRN):  dextrose Oral Gel 15 Gram(s) Oral once PRN Blood Glucose LESS THAN 70 milliGRAM(s)/deciliter      ALLERGIES:  Allergies    penicillins (Unknown)    Intolerances        OBJECTIVE:  ICU Vital Signs Last 24 Hrs  T(C): 36.7 (08 Nov 2023 04:00), Max: 37.3 (07 Nov 2023 07:00)  T(F): 98 (08 Nov 2023 04:00), Max: 99.1 (07 Nov 2023 07:00)  HR: 83 (08 Nov 2023 06:00) (65 - 98)  BP: --  BP(mean): --  ABP: 106/63 (08 Nov 2023 06:00) (16/12 - 124/74)  ABP(mean): 80 (08 Nov 2023 06:00) (60 - 122)  RR: 24 (08 Nov 2023 06:00) (15 - 38)  SpO2: 91% (08 Nov 2023 06:00) (90% - 98%)    O2 Parameters below as of 08 Nov 2023 00:00  Patient On (Oxygen Delivery Method): room air            Adult Advanced Hemodynamics Last 24 Hrs  CVP(mm Hg): 15 (07 Nov 2023 10:00) (5 - 17)  CVP(cm H2O): --  CO: --  CI: --  PA: 38/24 (07 Nov 2023 10:00) (22/6 - 44/25)  PA(mean): 28 (07 Nov 2023 10:00) (14 - 33)  PCWP: --  SVR: --  SVRI: --  PVR: --  PVRI: --  CAPILLARY BLOOD GLUCOSE      POCT Blood Glucose.: 145 mg/dL (07 Nov 2023 20:52)  POCT Blood Glucose.: 181 mg/dL (07 Nov 2023 17:34)  POCT Blood Glucose.: 179 mg/dL (07 Nov 2023 12:37)  POCT Blood Glucose.: 175 mg/dL (07 Nov 2023 08:45)    CAPILLARY BLOOD GLUCOSE      POCT Blood Glucose.: 145 mg/dL (07 Nov 2023 20:52)    I&O's Summary    06 Nov 2023 07:01  -  07 Nov 2023 07:00  --------------------------------------------------------  IN: 2008.1 mL / OUT: 3850 mL / NET: -1841.9 mL    07 Nov 2023 07:01  -  08 Nov 2023 06:58  --------------------------------------------------------  IN: 1217.6 mL / OUT: 2665 mL / NET: -1447.4 mL      Daily     Daily     PHYSICAL EXAMINATION:  General: WN/WD NAD  HEENT: PERRLA, EOMI, moist mucous membranes  Neurology: A&Ox3, nonfocal, MOISE x 4  Respiratory: CTA B/L, normal respiratory effort, no wheezes, crackles, rales  CV: RRR, S1S2, no murmurs, rubs or gallops  Abdominal: Soft, NT, ND +BS, Last BM  Extremities: No edema, + peripheral pulses  Incisions:   Tubes:    LABS:  ABG - ( 08 Nov 2023 01:01 )  pH, Arterial: 7.44  pH, Blood: x     /  pCO2: 29    /  pO2: 109   / HCO3: 20    / Base Excess: -3.4  /  SaO2: 97.9                                    11.5   8.05  )-----------( 212      ( 08 Nov 2023 01:36 )             34.4     11-08    131<L>  |  102  |  26<H>  ----------------------------<  139<H>  4.3   |  17<L>  |  1.41<H>    Ca    9.2      08 Nov 2023 01:36  Phos  3.8     11-08  Mg     2.2     11-08    TPro  6.7  /  Alb  3.2<L>  /  TBili  0.8  /  DBili  x   /  AST  37  /  ALT  48<H>  /  AlkPhos  46  11-08    LIVER FUNCTIONS - ( 08 Nov 2023 01:36 )  Alb: 3.2 g/dL / Pro: 6.7 g/dL / ALK PHOS: 46 U/L / ALT: 48 U/L / AST: 37 U/L / GGT: x           PT/INR - ( 08 Nov 2023 03:23 )   PT: 12.2 sec;   INR: 1.11 ratio         PTT - ( 08 Nov 2023 03:23 )  PTT:69.2 sec        Urinalysis Basic - ( 08 Nov 2023 01:36 )    Color: x / Appearance: x / SG: x / pH: x  Gluc: 139 mg/dL / Ketone: x  / Bili: x / Urobili: x   Blood: x / Protein: x / Nitrite: x   Leuk Esterase: x / RBC: x / WBC x   Sq Epi: x / Non Sq Epi: x / Bacteria: x        TELEMETRY:     EKG:     IMAGING:       PATIENT:  LD VALENCIA  [id number]    CHIEF COMPLAINT:  Patient is a 58y old  Male who presents with a chief complaint of CP/SOB (07 Nov 2023 06:24)      INTERVAL HISTORYOVERNIGHT EVENTS:      REVIEW OF SYSTEMS:    Constitutional:     [ ] negative [ ] fevers [ ] chills [ ] weight loss [ ] weight gain  HEENT:                  [ ] negative [ ] dry eyes [ ] eye irritation [ ] postnasal drip [ ] nasal congestion  CV:                         [ ] negative  [ ] chest pain [ ] orthopnea [ ] palpitations [ ] murmur  Resp:                     [ ] negative [ ] cough [ ] shortness of breath [ ] dyspnea [ ] wheezing [ ] sputum [ ] hemoptysis  GI:                          [ ] negative [ ] nausea [ ] vomiting [ ] diarrhea [ ] constipation [ ] abd pain [ ] dysphagia   :                        [ ] negative [ ] dysuria [ ] nocturia [ ] hematuria [ ] increased urinary frequency  Musculoskeletal: [ ] negative [ ] back pain [ ] myalgias [ ] arthralgias [ ] fracture  Skin:                       [ ] negative [ ] rash [ ] itch  Neurological:        [ ] negative [ ] headache [ ] dizziness [ ] syncope [ ] weakness [ ] numbness  Psychiatric:           [ ] negative [ ] anxiety [ ] depression  Endocrine:            [ ] negative [ ] diabetes [ ] thyroid problem  Heme/Lymph:      [ ] negative [ ] anemia [ ] bleeding problem  Allergic/Immune: [ ] negative [ ] itchy eyes [ ] nasal discharge [ ] hives [ ] angioedema    [ ] All other systems negative  [ ] Unable to assess ROS because ________.    MEDICATIONS:  MEDICATIONS  (STANDING):  albuterol    0.083% 2.5 milliGRAM(s) Nebulizer daily  aspirin  chewable 81 milliGRAM(s) Oral daily  atorvastatin 80 milliGRAM(s) Oral at bedtime  budesonide  80 MICROgram(s)/formoterol 4.5 MICROgram(s) Inhaler 2 Puff(s) Inhalation two times a day  chlorhexidine 2% Cloths 1 Application(s) Topical daily  dexMEDEtomidine Infusion 0.5 MICROgram(s)/kG/Hr (12.4 mL/Hr) IV Continuous <Continuous>  dextrose 50% Injectable 25 Gram(s) IV Push once  dextrose 50% Injectable 12.5 Gram(s) IV Push once  dextrose 50% Injectable 25 Gram(s) IV Push once  furosemide   Injectable 80 milliGRAM(s) IV Push daily  glucagon  Injectable 1 milliGRAM(s) IntraMuscular once  heparin  Infusion 1600 Unit(s)/Hr (17 mL/Hr) IV Continuous <Continuous>  hydrALAZINE 100 milliGRAM(s) Oral every 8 hours  insulin glargine Injectable (LANTUS) 8 Unit(s) SubCutaneous at bedtime  insulin lispro (ADMELOG) corrective regimen sliding scale   SubCutaneous three times a day before meals  insulin lispro (ADMELOG) corrective regimen sliding scale   SubCutaneous at bedtime  isosorbide   dinitrate Tablet (ISORDIL) 30 milliGRAM(s) Oral three times a day  LORazepam     Tablet 0.5 milliGRAM(s) Oral every 6 hours  nitroprusside Infusion 0.5 MICROgram(s)/kG/Min (7.42 mL/Hr) IV Continuous <Continuous>  nystatin    Suspension [id number] Unit(s) Oral every 6 hours  pantoprazole    Tablet 40 milliGRAM(s) Oral before breakfast  sodium chloride 0.65% Nasal 1 Spray(s) Both Nostrils three times a day  spironolactone 25 milliGRAM(s) Oral daily  ticagrelor 90 milliGRAM(s) Oral every 12 hours  tiotropium 2.5 MICROgram(s) Inhaler 2 Puff(s) Inhalation daily    MEDICATIONS  (PRN):  dextrose Oral Gel 15 Gram(s) Oral once PRN Blood Glucose LESS THAN 70 milliGRAM(s)/deciliter      ALLERGIES:  Allergies    penicillins (Unknown)    Intolerances    OBJECTIVE:  ICU Vital Signs Last 24 Hrs  T(C): 36.7 (08 Nov 2023 04:00), Max: 37.3 (07 Nov 2023 07:00)  T(F): 98 (08 Nov 2023 04:00), Max: 99.1 (07 Nov 2023 07:00)  HR: 83 (08 Nov 2023 06:00) (65 - 98)  BP: --  BP(mean): --  ABP: 106/63 (08 Nov 2023 06:00) (16/12 - 124/74)  ABP(mean): 80 (08 Nov 2023 06:00) (60 - 122)  RR: 24 (08 Nov 2023 06:00) (15 - 38)  SpO2: 91% (08 Nov 2023 06:00) (90% - 98%)    O2 Parameters below as of 08 Nov 2023 00:00  Patient On (Oxygen Delivery Method): room air    Adult Advanced Hemodynamics Last 24 Hrs  CVP(mm Hg): 15 (07 Nov 2023 10:00) (5 - 17)  CVP(cm H2O): --  CO: --  CI: --  PA: 38/24 (07 Nov 2023 10:00) (22/6 - 44/25)  PA(mean): 28 (07 Nov 2023 10:00) (14 - 33)  PCWP: --  SVR: --  SVRI: --  PVR: --  PVRI: --  CAPILLARY BLOOD GLUCOSE      POCT Blood Glucose.: 145 mg/dL (07 Nov 2023 20:52)  POCT Blood Glucose.: 181 mg/dL (07 Nov 2023 17:34)  POCT Blood Glucose.: 179 mg/dL (07 Nov 2023 12:37)  POCT Blood Glucose.: 175 mg/dL (07 Nov 2023 08:45)    CAPILLARY BLOOD GLUCOSE      POCT Blood Glucose.: 145 mg/dL (07 Nov 2023 20:52)    I&O's Summary    06 Nov 2023 07:01  -  07 Nov 2023 07:00  --------------------------------------------------------  IN: 2008.1 mL / OUT: 3850 mL / NET: -1841.9 mL    07 Nov 2023 07:01  -  08 Nov 2023 06:58  --------------------------------------------------------  IN: 1217.6 mL / OUT: 2665 mL / NET: -1447.4 mL      PHYSICAL EXAMINATION:  General: WN/WD NAD  HEENT: PERRLA, EOMI, moist mucous membranes  Neurology: A&Ox3, nonfocal, MOISE x 4  Respiratory: CTA B/L, normal respiratory effort, no wheezes, crackles, rales  CV: RRR, S1S2, no murmurs, rubs or gallops  Abdominal: Soft, NT, ND +BS, Last BM  Extremities: No edema, + peripheral pulses  Incisions:   Tubes:    LABS:  ABG - ( 08 Nov 2023 01:01 )  pH, Arterial: 7.44  pH, Blood: x     /  pCO2: 29    /  pO2: 109   / HCO3: 20    / Base Excess: -3.4  /  SaO2: 97.9                              11.5   8.05  )-----------( 212      ( 08 Nov 2023 01:36 )             34.4     11-08    131<L>  |  102  |  26<H>  ----------------------------<  139<H>  4.3   |  17<L>  |  1.41<H>    Ca    9.2      08 Nov 2023 01:36  Phos  3.8     11-08  Mg     2.2     11-08    TPro  6.7  /  Alb  3.2<L>  /  TBili  0.8  /  DBili  x   /  AST  37  /  ALT  48<H>  /  AlkPhos  46  11-08    LIVER FUNCTIONS - ( 08 Nov 2023 01:36 )  Alb: 3.2 g/dL / Pro: 6.7 g/dL / ALK PHOS: 46 U/L / ALT: 48 U/L / AST: 37 U/L / GGT: x           PT/INR - ( 08 Nov 2023 03:23 )   PT: 12.2 sec;   INR: 1.11 ratio         PTT - ( 08 Nov 2023 03:23 )  PTT:69.2 sec        Urinalysis Basic - ( 08 Nov 2023 01:36 )    Color: x / Appearance: x / SG: x / pH: x  Gluc: 139 mg/dL / Ketone: x  / Bili: x / Urobili: x   Blood: x / Protein: x / Nitrite: x   Leuk Esterase: x / RBC: x / WBC x   Sq Epi: x / Non Sq Epi: x / Bacteria: x        TELEMETRY: reviewed  EKG:   IMAGING:       PATIENT:  LD VALENCIA  [id number]    CHIEF COMPLAINT:  Patient is a 58y old  Male who presents with a chief complaint of CP/SOB (07 Nov 2023 06:24)      INTERVAL HISTORY/OVERNIGHT EVENTS: agitated overnight.      REVIEW OF SYSTEMS:    Constitutional:     [X] negative [ ] fevers [ ] chills [ ] weight loss [ ] weight gain  HEENT:                  [ X] negative [ ] dry eyes [ ] eye irritation [ ] postnasal drip [ ] nasal congestion  CV:                         [X ] negative  [ ] chest pain [ ] orthopnea [ ] palpitations [ ] murmur  Resp:                     [X ] negative [ ] cough [ ] shortness of breath [ ] dyspnea [ ] wheezing [ ] sputum [ ] hemoptysis  GI:                          [X] negative [ ] nausea [ ] vomiting [ ] diarrhea [ ] constipation [ ] abd pain [ ] dysphagia   :                        [X ] negative [ ] dysuria [ ] nocturia [ ] hematuria [ ] increased urinary frequency  Musculoskeletal: [X ] negative [ ] back pain [ ] myalgias [ ] arthralgias [ ] fracture  Skin:                       [X ] negative [ ] rash [ ] itch  Neurological:        [X] negative [ ] headache [ ] dizziness [ ] syncope [ ] weakness [ ] numbness  [ ] All other systems negative  [ ] Unable to assess ROS because ________.    MEDICATIONS:  MEDICATIONS  (STANDING):  albuterol    0.083% 2.5 milliGRAM(s) Nebulizer daily  aspirin  chewable 81 milliGRAM(s) Oral daily  atorvastatin 80 milliGRAM(s) Oral at bedtime  budesonide  80 MICROgram(s)/formoterol 4.5 MICROgram(s) Inhaler 2 Puff(s) Inhalation two times a day  chlorhexidine 2% Cloths 1 Application(s) Topical daily  dexMEDEtomidine Infusion 0.5 MICROgram(s)/kG/Hr (12.4 mL/Hr) IV Continuous <Continuous>  dextrose 50% Injectable 25 Gram(s) IV Push once  dextrose 50% Injectable 12.5 Gram(s) IV Push once  dextrose 50% Injectable 25 Gram(s) IV Push once  furosemide   Injectable 80 milliGRAM(s) IV Push daily  glucagon  Injectable 1 milliGRAM(s) IntraMuscular once  heparin  Infusion 1600 Unit(s)/Hr (17 mL/Hr) IV Continuous <Continuous>  hydrALAZINE 100 milliGRAM(s) Oral every 8 hours  insulin glargine Injectable (LANTUS) 8 Unit(s) SubCutaneous at bedtime  insulin lispro (ADMELOG) corrective regimen sliding scale   SubCutaneous three times a day before meals  insulin lispro (ADMELOG) corrective regimen sliding scale   SubCutaneous at bedtime  isosorbide   dinitrate Tablet (ISORDIL) 30 milliGRAM(s) Oral three times a day  LORazepam     Tablet 0.5 milliGRAM(s) Oral every 6 hours  nitroprusside Infusion 0.5 MICROgram(s)/kG/Min (7.42 mL/Hr) IV Continuous <Continuous>  nystatin    Suspension [id number] Unit(s) Oral every 6 hours  pantoprazole    Tablet 40 milliGRAM(s) Oral before breakfast  sodium chloride 0.65% Nasal 1 Spray(s) Both Nostrils three times a day  spironolactone 25 milliGRAM(s) Oral daily  ticagrelor 90 milliGRAM(s) Oral every 12 hours  tiotropium 2.5 MICROgram(s) Inhaler 2 Puff(s) Inhalation daily    MEDICATIONS  (PRN):  dextrose Oral Gel 15 Gram(s) Oral once PRN Blood Glucose LESS THAN 70 milliGRAM(s)/deciliter      ALLERGIES:  Allergies    penicillins (Unknown)    Intolerances    OBJECTIVE:  ICU Vital Signs Last 24 Hrs  T(C): 36.7 (08 Nov 2023 04:00), Max: 37.3 (07 Nov 2023 07:00)  T(F): 98 (08 Nov 2023 04:00), Max: 99.1 (07 Nov 2023 07:00)  HR: 83 (08 Nov 2023 06:00) (65 - 98)  BP: --  BP(mean): --  ABP: 106/63 (08 Nov 2023 06:00) (16/12 - 124/74)  ABP(mean): 80 (08 Nov 2023 06:00) (60 - 122)  RR: 24 (08 Nov 2023 06:00) (15 - 38)  SpO2: 91% (08 Nov 2023 06:00) (90% - 98%)    O2 Parameters below as of 08 Nov 2023 00:00  Patient On (Oxygen Delivery Method): room air    POCT Blood Glucose.: 145 mg/dL (07 Nov 2023 20:52)  POCT Blood Glucose.: 181 mg/dL (07 Nov 2023 17:34)  POCT Blood Glucose.: 179 mg/dL (07 Nov 2023 12:37)  POCT Blood Glucose.: 175 mg/dL (07 Nov 2023 08:45)    CAPILLARY BLOOD GLUCOSE      POCT Blood Glucose.: 145 mg/dL (07 Nov 2023 20:52)    I&O's Summary    06 Nov 2023 07:01  -  07 Nov 2023 07:00  --------------------------------------------------------  IN: 2008.1 mL / OUT: 3850 mL / NET: -1841.9 mL    07 Nov 2023 07:01  -  08 Nov 2023 06:58  --------------------------------------------------------  IN: 1217.6 mL / OUT: 2665 mL / NET: -1447.4 mL      PHYSICAL EXAMINATION:  General: alert, in no acute distress  HEENT: PERRLA, EOMI, moist mucous membranes  Neurology: A&Ox3, nonfocal, MOISE x 4  Respiratory: CTA B/L, normal respiratory effort, no wheezes, crackles, rales  CV: RRR, S1S2, no murmurs, rubs or gallops  Abdominal: Soft, NT, ND +BS  Extremities: No edema, + palpable peripheral pulses  Skin: warm and dry. R femoral IABP dressing clean, dry and intact, site nontender    LABS:  ABG - ( 08 Nov 2023 01:01 )  pH, Arterial: 7.44  pH, Blood: x     /  pCO2: 29    /  pO2: 109   / HCO3: 20    / Base Excess: -3.4  /  SaO2: 97.9                              11.5   8.05  )-----------( 212      ( 08 Nov 2023 01:36 )             34.4     11-08    131<L>  |  102  |  26<H>  ----------------------------<  139<H>  4.3   |  17<L>  |  1.41<H>    Ca    9.2      08 Nov 2023 01:36  Phos  3.8     11-08  Mg     2.2     11-08    TPro  6.7  /  Alb  3.2<L>  /  TBili  0.8  /  DBili  x   /  AST  37  /  ALT  48<H>  /  AlkPhos  46  11-08    LIVER FUNCTIONS - ( 08 Nov 2023 01:36 )  Alb: 3.2 g/dL / Pro: 6.7 g/dL / ALK PHOS: 46 U/L / ALT: 48 U/L / AST: 37 U/L / GGT: x           PT/INR - ( 08 Nov 2023 03:23 )   PT: 12.2 sec;   INR: 1.11 ratio         PTT - ( 08 Nov 2023 03:23 )  PTT:69.2 sec        Urinalysis Basic - ( 08 Nov 2023 01:36 )    Color: x / Appearance: x / SG: x / pH: x  Gluc: 139 mg/dL / Ketone: x  / Bili: x / Urobili: x   Blood: x / Protein: x / Nitrite: x   Leuk Esterase: x / RBC: x / WBC x   Sq Epi: x / Non Sq Epi: x / Bacteria: x      TELEMETRY: reviewed  EKG:   IMAGING:

## 2023-11-08 NOTE — PROGRESS NOTE ADULT - ATTENDING COMMENTS
55 yo M with known CAD and HFrEF (TTE 2018 showed low LVEF with anteroseptal akinesis) who presented with COVID infection, NSTEMI and CS.  RHC showed biventricular congestion and low CI. LHC showed occluded LAD and severe diffuse RCA disease (filling via L-R collaterals)  Improved with IABP and afterload reduction.     Initially failed IABP wean with hypotension, reduction of CI and worsening Cr, LFTs and Na.  On 11/6: RA~13, PAd~27 and CI~3 on IABP 1:1 and Nipride @2mcg/kg/min     After diuresis, he was weaned successfully without inotropes  Cr 1.4, baseline 1.1  AST/ALT improving close to normal now   CVP~12-13 on 11/7 - Cordis then removed    - Continue Diuresis with Lasix IV discussed with CCU team  - Start Losartan 25 mg daily - Lower isordil/hydral  - Continue Spironolactone 25 mg daily  - Continue ASA and Ticagrelor  - Would pursue CMRI to assess viability of anterior wall at least 10 days from presentation (after 11/10) - no collaterals are seen to LAD territory and there was a clear rise in troponin this admission   - Recovery remains dubious, will breach subject of AT work-up with patient     Erlin Quijano MD

## 2023-11-08 NOTE — BH CONSULTATION LIAISON ASSESSMENT NOTE - SUMMARY
59 yo employed  male, domiciled in a house with wife, with PPH of anxiety (Lorazepam 0.25mg prn), no prior psych admission, and PMHx of HTN, CAD w/ 1 stent in 2009, ICH (2008) presenting with abn ekg, NSTEMI. Patient was transferred from [facility] to [facility] for cath, was intubated prior to cath for dyspneic and diaphoretic so was intubated, and currently COVID positive. Psych was consulted for irritability and aggressiveness. Upon interview, pt is AO x 4 and calm. No irritability noted. As per pt, he got frustrated/annoyed with the staff was when they were not listening to him, but is normally a calm hazel. Pt wishes for his anxiety to be resolved, and wanting to be transferred to [facility]. Denies any SI/SA/HI/VH/AH. Currently on Lorazepam 0.5mg Q6 hrs, and he is requesting to stay on the same regimen.     Plan  - C/w Lorazepam 0.5mg Q6 hrs  59 yo employed  male, domiciled in a house with wife, with PPH of anxiety (Lorazepam 0.25mg prn), no prior psych admission, and PMHx of HTN, CAD w/ 1 stent in 2009, ICH (2008) presenting with abn ekg, NSTEMI. Patient was transferred from [facility] to [facility] for cath, was intubated prior to cath for dyspneic and diaphoretic so was intubated, and currently COVID positive. Psych was consulted for irritability and aggressiveness. Upon interview, pt is AO x 4 and calm. No irritability noted. As per pt, he got frustrated/annoyed with the staff was when they were not listening to him, but is normally a calm hazel. Pt wishes for his anxiety to be resolved, and wanting to be transferred to [facility]. Denies any SI/SA/HI/VH/AH. Currently on Lorazepam 0.5mg Q6 hrs, and he is requesting to stay on the same regimen.     Plan  - C/w Lorazepam 0.5mg Q6 hrs  PRN for anxiety  - pt could benefit from SSRI for irritability/anxiety, however Na levels low, and pt does not want to start at this time

## 2023-11-08 NOTE — BH CONSULTATION LIAISON ASSESSMENT NOTE - DESCRIPTION
Resides in a house in [place], but states that he has multiple houses in [place] and [place].   He is  to his wife, behavioral therapist, as per pt.   Has 3 children.   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   Denies any smoking, drinking, and illicit drug use

## 2023-11-08 NOTE — BH CONSULTATION LIAISON ASSESSMENT NOTE - NSBHCHARTREVIEWLAB_PSY_A_CORE FT
11.5   8.05  )-----------( 212      ( 08 Nov 2023 01:36 )             34.4   11-08    131<L>  |  102  |  26<H>  ----------------------------<  139<H>  4.3   |  17<L>  |  1.41<H>    Ca    9.2      08 Nov 2023 01:36  Phos  3.8     11-08  Mg     2.2     11-08    TPro  6.7  /  Alb  3.2<L>  /  TBili  0.8  /  DBili  x   /  AST  37  /  ALT  48<H>  /  AlkPhos  46  11-08

## 2023-11-08 NOTE — PROGRESS NOTE ADULT - PROBLEM SELECTOR PLAN 2
- pLAD 70 % stenosis in the ostium portion of the segment and 100 % in-stent restenosis. mRCA, 100 % stenosis.   - Currently ASA, Atorvastatin 80mg and Brilinta  - Pending viability study, but poor coronary perfusion even when LAD was patent. Will need to be 7-10 days post MI to prevent FP with LGE (can light up if there is edema and be mistaken for scar)

## 2023-11-08 NOTE — PROGRESS NOTE ADULT - SUBJECTIVE AND OBJECTIVE BOX
LD VALENCIA  MRN-[id number]  Patient is a 58y old  Male who presents with a chief complaint of CP/SOB (2023 06:24)    HPI: 58M w/ hx HTN, CAD w/ 1 stent in , ICH () presenting with abn ekg. Patient presented to [facility] where he was found to have STEMI, recommended to get cath however patient did not want to get it there so it left and came here.  Patient initially had cough, congestion, fever, was placed on antibiotics on .  Started feeling nauseous and had a presyncopal event after which he presented to ED last night.  Had chest pain as well.  Chest pain is midsternal.  Not currently having chest pain.  Received 4 aspirin 30 min pta. (2023 15:11)    REVIEW OF SYSTEMS:  CONSTITUTIONAL: No weakness, fevers or chills  EYES/ENT: No visual changes;  No vertigo or throat pain   NECK: No pain or stiffness  RESPIRATORY: No cough, wheezing, hemoptysis; No shortness of breath  CARDIOVASCULAR: No chest pain or palpitations  GASTROINTESTINAL: No abdominal or epigastric pain  No nausea, vomiting, or hematemesis; No diarrhea or constipation. No melena or hematochezia.  GENITOURINARY: No dysuria, frequency or hematuria  NEUROLOGICAL: No numbness or weakness  SKIN: No itching, rashes    ICU Vital Signs Last 24 Hrs  T(C): 36.8 (2023 17:00), Max: 36.8 (2023 11:00)  T(F): 98.2 (2023 17:00), Max: 98.2 (2023 11:00)  HR: 112 (2023 19:00) (66 - 112)  BP: 134/65 (2023 19:00) (134/65 - 134/80)  BP(mean): 91 (2023 19:00) (91 - 100)  ABP: 145/69 (2023 18:15) (95/50 - 145/69)  ABP(mean): 95 (2023 18:15) (68 - 95)  RR: 20 (2023 19:00) (15 - 38)  SpO2: 93% (2023 19:00) (90% - 97%)    O2 Parameters below as of 2023 18:00  Patient On (Oxygen Delivery Method): room air      CVP(mm Hg): 15 (23 @ 10:00) (5 - 31)  PA: 38/24 (23 @ 10:00) (22/6 - 215/204)  PA(mean): 28 (23 @ 10:00) (14 - 207)      I&O's Summary    2023 07:01  -  2023 07:00  --------------------------------------------------------  IN: 1232.4 mL / OUT: 3705 mL / NET: -2472.6 mL    2023 07:01  -  2023 19:19  --------------------------------------------------------  IN: 1033.2 mL / OUT: 1485 mL / NET: -451.8 mL      CAPILLARY BLOOD GLUCOSE  POCT Blood Glucose.: 133 mg/dL (2023 18:13)  ============================I/O===========================   I&O's Detail    2023 07:01  -  2023 07:00  --------------------------------------------------------  IN:    Dexmedetomidine: 492.7 mL    Heparin: 234 mL    Nitroprusside: 505.7 mL  Total IN: 1232.4 mL    OUT:    Indwelling Catheter - Urethral (mL): 3705 mL  Total OUT: 3705 mL    Total NET: -2472.6 mL      2023 07:01  -  2023 19:19  --------------------------------------------------------  IN:    Dexmedetomidine: 118.8 mL    Heparin: 187 mL    Nitroprusside: 7.4 mL    Oral Fluid: 720 mL  Total IN: 1033.2 mL    OUT:    Indwelling Catheter - Urethral (mL): 1485 mL  Total OUT: 1485 mL    Total NET: -451.8 mL  ============================ LABS =========================                      11.5   8.05  )-----------( 212      ( 2023 01:36 )             34.4     11-08    131<L>  |  102  |  26<H>  ----------------------------<  139<H>  4.3   |  17<L>  |  1.41<H>    Ca    9.2      2023 01:36  Phos  3.8     11-08  Mg     2.2     11-08    TPro  6.7  /  Alb  3.2<L>  /  TBili  0.8  /  DBili  x   /  AST  37  /  ALT  48<H>  /  AlkPhos  46  11-08    LIVER FUNCTIONS - ( 2023 01:36 )  Alb: 3.2 g/dL / Pro: 6.7 g/dL / ALK PHOS: 46 U/L / ALT: 48 U/L / AST: 37 U/L / GGT: x           PT/INR - ( 2023 03:23 )   PT: 12.2 sec;   INR: 1.11 ratio    PTT - ( 2023 08:22 )  PTT:80.6 sec    ABG - ( 2023 01:01 )  pH, Arterial: 7.44  pH, Blood: x     /  pCO2: 29    /  pO2: 109   / HCO3: 20    / Base Excess: -3.4  /  SaO2: 97.9      Blood Gas Arterial, Lactate: 0.8 mmol/L (23 @ 01:01)  Blood Gas Arterial, Lactate: 0.7 mmol/L (23 @ 17:40)  Blood Gas Arterial, Lactate: 0.8 mmol/L (23 @ 12:50)  Blood Gas Venous - Lactate: 0.7 mmol/L (23 @ 00:37)  Blood Gas Arterial, Lactate: 0.7 mmol/L (23 @ 00:37)  Blood Gas Venous - Lactate: 0.8 mmol/L (23 @ 20:15)  Blood Gas Venous - Lactate: 0.9 mmol/L (23 @ 17:50)  Blood Gas Arterial, Lactate: 1.0 mmol/L (23 @ 03:17)  Blood Gas Arterial, Lactate: 0.9 mmol/L (23 @ 01:47)  Blood Gas Arterial, Lactate: 1.0 mmol/L (23 @ 22:10)    Urinalysis Basic - ( 2023 01:36 )    Color: x / Appearance: x / SG: x / pH: x  Gluc: 139 mg/dL / Ketone: x  / Bili: x / Urobili: x   Blood: x / Protein: x / Nitrite: x   Leuk Esterase: x / RBC: x / WBC x   Sq Epi: x / Non Sq Epi: x / Bacteria: x  ======================Micro/Rad/Cardio=================  Telemetry: Reviewed   EKG: Reviewed  CXR: Reviewed  Culture: Reviewed   Echo:   Cath: Cardiac Cath Lab - Adult:   [facility]  Department of Cardiology  [address] [place] [address]  [phone]  Cath Lab Report -- Comprehensive Report  Patient: LD VALENCIA  Study date: 2017  Account number: [id number]  MR number: [id number]  : 1965  Gender: Male  Race: W  Case Physician(s):  Jairon Holguin M.D.  Referring Physician:  Luc Lynn M.D.  INDICATIONS: Unstable angina - CCS4.  HISTORY: The patient has a history of coronary artery disease. The patient  hashypertension and medication-treated dyslipidemia.  PROCEDURE:  --  Left heart catheterization with ventriculography.  --  Left coronary angiography.  --  Right coronary angiography.  TECHNIQUE: The risks and alternatives of the procedures and conscious  sedation were explained to the patient and informed consent was obtained.  Cardiac catheterization performed electively.  Local anesthetic given. Right radial artery access. A 6FR PRELUDE KIT was  inserted in the vessel. Left heart catheterization. Ventriculography was  performed. A 5FR FR4.0 EXPO catheter was utilized. Left coronary artery  angiography. The vessel was injected utilizing a 5FR FL3.5 EXPO catheter.  Right coronary artery angiography. The vessel was injected utilizing a 5FR  FR4.0 EXPO catheter. RADIATION EXPOSURE: 1.1 min.  CONTRAST GIVEN: Omnipaque 55 ml.  MEDICATIONS GIVEN: Midazolam, 1 mg, IV. Fentanyl, 25 mcg, IV. Verapamil  (Isoptin, Calan, Covera), 2.5 mg, IA. Heparin, 3000 units, IA.  VENTRICLES: Global left ventricular function was moderately depressed. EF  estimated was 40 %.  CORONARY VESSELS: The coronary circulation is right dominant.  LM:   --  LM: Normal.  LAD:   --  Proximal LAD: There was a 50 % stenosis.  CX:   --  Circumflex: Normal.  RCA:   --  Mid RCA: There was a 40 % stenosis.  --  Distal RCA: There was a 50 % stenosis.  COMPLICATIONS: There were no complications.  DIAGNOSTIC RECOMMENDATIONS: The patient should continue with the present  medications.  Prepared and signed by  Jairon Holguin M.D.  Signed 2017 12:20:13  HEMODYNAMIC TABLES  Pressures:  Baseline/ Room Air  Pressures:  - HR: 78  Pressures:  - Rhythm:  Pressures:  -- Aortic Pressure (S/D/M): --/--/99  Pressures:  -- Left Ventricle (s/edp): 157/39/--  Outputs:  Baseline/ Room Air  Outputs:  -- CALCULATIONS[age]  Outputs:  -- CALCULATIONS: Body Surface Area: 2.05  Outputs:  -- CALCULATIONS: Height in cm: 175.00  Outputs:  -- CALCULATIONS: Sex: Male  Outputs:  -- CALCULATIONS: Weight in k.40 (17 @ 21:55)  ======================================================  PAST MEDICAL & SURGICAL HISTORY:  HTN (hypertension)      CAD (coronary artery disease)  ; stent      Intracranial hemorrhage        Respiratory arrest        Myocardial infarction, unspecified MI type, unspecified artery      History of coronary artery stent placement    A/P: 57 yo M with HTN, CAD (s/p PCI ), HFrEF, CVA , and T2DM presenting with chest pressure and unknown tachycardia that was shocked x1, C  found to have in-stent restenosis of pLAD and  of RCA with elevated RA and PA pressures and severely decreased EF 32%, s/p IABP  and admitted to CICU for management of cardiogenic shock.    NEURO  # Hx of CVA  - A&O x3  - No neuro deficits known     # Anxiety  - Takes 0.5 ativan PRN at home, c/w ativan 0.5 q6 hours ATCx3 days then PRN  - Wean off precedex as tolerated  - f/u psych consult for medical management of anxiety    PULM  #AHRF   - 2/2 pulmonary edema requiring Intubation, resolved  - Extubated 11/3, saturating well on room air  - continue to monitor sp02    # COVID +  - Maintain Airborne precautions  - Defer to ID for remdesivir or steroids    # Asthma (hx of respiratory failure in 2018- intubated for 20 minutes per chart review pt report)  - c/w albuterol, symbicort and spiriva  - on trelegy at home    CV  # Cardiogenic shock requiring IABP (- )  - likely 2/2 NSTEMI and ADHF  -  [facility]: pLAD 100 % in-stent restenosis & mRCA, 100 %. PCWP 30  -  TTE: LV dilated. EF 32 %. Regional WMAs present, mod (grade 2) LV diastolic dysfunction  -  TTE: EF 22% and + LV thrombus   - s/p lasix 80 this am, appears euvolemic on exam  - IABP removed   - Hydralazine increased to 75 from 40 overnight and c/w isordil for AL reduction  - nipride weaned off this am  - continue to monitor perfusion indices daily  - continue Strict I&O, goal net negative  - Daily weights  - Fluid restriction    # Stent re-occlusion of pLAD and 100%  of RCA  - EKG on admission w/ LBBB  - DAPT: c/w ASA, brillinta   - c/w lipitor 80  - CT sx not recommending CABG, undergoing AT eval  - Viability study when IABP removed    # LV thrombus  - c/w heparin gtt    # HTN  - afterload reduction as above    /RENAL  # ARIC  - sCr downtrending, Baseline Cr: 1-1.22  - diuresis as above  - Trend BMP, lytes daily, replace as needed  - continue Strict I/Os, avoid nephrotoxins    GI  # Transaminitis  - Likely iso cardiogenic shock  - Improving, continue to trend daily    # GERD  - c/w home protonix  - Tolerating diet  - BM     # Bowel regimen  - miralax and senna d/c'd iso loose stools    ENDO  # Type 2 DM  - A1c 8.3  - c/w lantus and ISS  - -160s    HEME  # VTE prophylaxis   - H/H stable  - c/w heparin gtt    ID  # Concern for aspiration event prior to intubation  - Right sided opacification on CXR , clear today   - Last fever  (100.6)  - Blood cultures  and  NGTD  - UA  negative  - MRSA swab negative  - Continue to monitor off ABx (s/p vanco and cefepime )  - ID following    # COVID  - Maintain airborne precautions    GOC  - Full code  ======================= DISPOSITION  =====================  [ ] Critical   [ ] Guarded    [X] Stable    [ ] Maintain in CICU  [X] Downgrade to Telemetry  [ ] Discharge Home    I have personally provided 30 minutes of critical care time excluding time spent on separate procedures, in addition to initial critical care time provided by the CICU Attending, Dr. Nabeel [facility] [phone]

## 2023-11-08 NOTE — BH CONSULTATION LIAISON ASSESSMENT NOTE - NSBHCHARTREVIEWINVESTIGATE_PSY_A_CORE FT
< from: 12 Lead ECG (11.06.23 @ 04:19) >    QTC Calculation(Bazett) 514 ms    P Axis 40 degrees    R Axis 16 degrees    T Axis 154 degrees    Diagnosis Line NORMAL SINUS RHYTHM  POSSIBLE LEFT ATRIAL ENLARGEMENT  CANNOT RULE OUT ANTERIOR INFARCT , AGE UNDETERMINED  ST & T WAVE ABNORMALITY, CONSIDER LATERAL ISCHEMIA  PROLONGED QT  ABNORMAL ECG  WHEN COMPARED WITH ECG OF 11/5/23  NO SIGNIFICANT CHANGE WAS FOUND    < from: CT Head No Cont (10.05.18 @ 16:57) >    IMPRESSION:    Stable exam.    Stable appearance of hemorrhagic left PCA distribution occipital lobe   infarction.  < from: CT Angio Head w/ IV Cont (03.03.22 @ 15:14) >    IMPRESSION:    CT brain:  No hydrocephalus, acute intracranial hemorrhage, mass effect, or brain   edema.  Chronic right centrum semiovale/corona radiata, right mesial thalamic,   and right posterior cerebellar hemisphere infarcts..    CT brain perfusion:  Cerebral blood flow less than 30% = 0 mL. Therefore, no core infarct is   predicted.    Tmax greater than 6 seconds = 0 mL. Therefore, no brain parenchyma is   predicted to be at continued ischemic risk in the presence of neurologic   symptoms    CTA brain:  No flow-limiting stenosis or vascular aneurysm. No AVM.    < from: MR Head No Cont (03.03.22 @ 22:47) >    IMPRESSION:    No acute infarction, cerebral edema or mass effect.    Chronic right thalamic and left occipital encephalomalacia with chronic   hemosiderin deposition.    Stable in size subependymal nodules as described.

## 2023-11-08 NOTE — CHART NOTE - NSCHARTNOTEFT_GEN_A_CORE
CCU Transfer Note    Transfer from: CCU    Transfer to: (  ) Medicine    (  ) Telemetry     (   ) RCU        (    ) Palliative         (   ) Stroke Unit       (  ) MICU   (   ) __________________    Accepting Physician:    Signout given to:     CCU COURSE:  58M with HTN, CAD (s/p PCI 2008), ICH, CVA 2008, HFrEF (EF severely reduced 2018), asthma (required intubation in 2018), DM presenting with chest pressure and SOB for one week. He was told he had bilateral pneumonia as an outpatient and was started on antibiotics. On the night of presentation, he states that he was diaphoretic and nauseous. Patient is a poor historian, but per family he passed out briefly for a few seconds and had a heart rate of 180s. EMS was called, who defibrillated patient at his home (no strips available). Patient was initially taken to [facility] where he signed out AMA because he wanted to be seen at [facility]. On presentation to [facility] 11/1, he was brought urgently to the cath lab for left heart cath. He was intubated in the lab for respiratory distress i/s/o pulmonary edema. His LHC revealed pLAD 100% in-stent restenosis, mRCA 100% and a PCWP of 30. An IABP was placed and he was transferred to CICU for cardiogenic shock management likely s/t acute on chronic heart failure (ef 22%). On admission he was also incidentally found to be COVID +.     While in CICU, he was extubated on 11/3. His balloon pump was weaned and removed on 11/7. He has tolerated weaning from precedex for agitation and anxiety with PO ativan and seroquel, and uptitration of afterload reduction for GDMT. He is on aspirin and brillinta for DAPT as well as a heparin gtt for LV thrombus found on echo.       FOR FOLLOW UP:   [] cardiac MRI   []     PAST MEDICAL & SURGICAL HISTORY:  HTN (hypertension)      CAD (coronary artery disease)  2009; stent      Intracranial hemorrhage  2008      Respiratory arrest  december 1st      Myocardial infarction, unspecified MI type, unspecified artery      History of coronary artery stent placement          Vital Signs Last 24 Hrs  T(C): 36.8 (08 Nov 2023 11:00), Max: 36.8 (08 Nov 2023 11:00)  T(F): 98.2 (08 Nov 2023 11:00), Max: 98.2 (08 Nov 2023 11:00)  HR: 110 (08 Nov 2023 16:15) (66 - 110)  BP: --  BP(mean): --  RR: 25 (08 Nov 2023 16:15) (15 - 38)  SpO2: 95% (08 Nov 2023 16:15) (90% - 97%)    Parameters below as of 08 Nov 2023 11:00  Patient On (Oxygen Delivery Method): room air      I&O's Summary    07 Nov 2023 07:01  -  08 Nov 2023 07:00  --------------------------------------------------------  IN: 1232.4 mL / OUT: 3705 mL / NET: -2472.6 mL    08 Nov 2023 07:01  -  08 Nov 2023 16:53  --------------------------------------------------------  IN: 708.2 mL / OUT: 1325 mL / NET: -616.8 mL      Allergies    penicillins (Unknown)    Intolerances      MEDICATIONS  (STANDING):  albuterol    0.083% 2.5 milliGRAM(s) Nebulizer daily  aspirin  chewable 81 milliGRAM(s) Oral daily  atorvastatin 80 milliGRAM(s) Oral at bedtime  budesonide  80 MICROgram(s)/formoterol 4.5 MICROgram(s) Inhaler 2 Puff(s) Inhalation two times a day  chlorhexidine 2% Cloths 1 Application(s) Topical daily  dextrose 50% Injectable 12.5 Gram(s) IV Push once  dextrose 50% Injectable 25 Gram(s) IV Push once  dextrose 50% Injectable 25 Gram(s) IV Push once  furosemide   Injectable 80 milliGRAM(s) IV Push daily  glucagon  Injectable 1 milliGRAM(s) IntraMuscular once  heparin  Infusion 1600 Unit(s)/Hr (17 mL/Hr) IV Continuous <Continuous>  hydrALAZINE 100 milliGRAM(s) Oral every 8 hours  insulin glargine Injectable (LANTUS) 8 Unit(s) SubCutaneous at bedtime  insulin lispro (ADMELOG) corrective regimen sliding scale   SubCutaneous three times a day before meals  insulin lispro (ADMELOG) corrective regimen sliding scale   SubCutaneous at bedtime  isosorbide   dinitrate Tablet (ISORDIL) 30 milliGRAM(s) Oral three times a day  nystatin    Suspension [id number] Unit(s) Oral every 6 hours  pantoprazole    Tablet 40 milliGRAM(s) Oral before breakfast  QUEtiapine 25 milliGRAM(s) Oral daily  sodium chloride 0.65% Nasal 1 Spray(s) Both Nostrils three times a day  spironolactone 25 milliGRAM(s) Oral daily  ticagrelor 90 milliGRAM(s) Oral every 12 hours  tiotropium 2.5 MICROgram(s) Inhaler 2 Puff(s) Inhalation daily    MEDICATIONS  (PRN):  dextrose Oral Gel 15 Gram(s) Oral once PRN Blood Glucose LESS THAN 70 milliGRAM(s)/deciliter  LORazepam     Tablet 0.5 milliGRAM(s) Oral every 6 hours PRN Anxiety      Adult Advanced Hemodynamics Last 24 Hrs  CVP(mm Hg): --  CVP(cm H2O): --  CO: --  CI: --  PA: --  PA(mean): --  PCWP: --  SVR: --  SVRI: --  PVR: --  PVRI: --                              11.5   8.05  )-----------( 212      ( 08 Nov 2023 01:36 )             34.4     11-08    131<L>  |  102  |  26<H>  ----------------------------<  139<H>  4.3   |  17<L>  |  1.41<H>    Ca    9.2      08 Nov 2023 01:36  Phos  3.8     11-08  Mg     2.2     11-08    TPro  6.7  /  Alb  3.2<L>  /  TBili  0.8  /  DBili  x   /  AST  37  /  ALT  48<H>  /  AlkPhos  46  11-08    PT/INR - ( 08 Nov 2023 03:23 )   PT: 12.2 sec;   INR: 1.11 ratio         PTT - ( 08 Nov 2023 08:22 )  PTT:80.6 sec  PHYSICAL EXAM:      ABG - ( 08 Nov 2023 01:01 )  pH, Arterial: 7.44  pH, Blood: x     /  pCO2: 29    /  pO2: 109   / HCO3: 20    / Base Excess: -3.4  /  SaO2: 97.9              Lactate: CCU Transfer Note    Transfer from: CCU    Transfer to: (  ) Medicine    (X) Telemetry     (   ) RCU        (    ) Palliative         (   ) Stroke Unit       (  ) MICU   (   ) __________________    Accepting Physician:    Signout given to:     CCU COURSE:  58M with HTN, CAD (s/p PCI 2008), ICH, CVA 2008, HFrEF (EF severely reduced 2018), asthma (required intubation in 2018), DM presenting with chest pressure and SOB for one week. He was told he had bilateral pneumonia as an outpatient and was started on antibiotics. On the night of presentation, he states that he was diaphoretic and nauseous. Patient is a poor historian, but per family he passed out briefly for a few seconds and had a heart rate of 180s. EMS was called, who defibrillated patient at his home (no strips available). Patient was initially taken to [facility] where he signed out AMA because he wanted to be seen at [facility]. On presentation to [facility] 11/1, he was brought urgently to the cath lab for left heart cath. He was intubated in the lab for respiratory distress i/s/o pulmonary edema. His LHC revealed pLAD 100% in-stent restenosis, mRCA 100% and a PCWP of 30. An IABP was placed and he was transferred to CICU for cardiogenic shock management likely s/t acute on chronic heart failure (ef 22%). On admission he was also incidentally found to be COVID +.     While in CICU, he was extubated on 11/3. His balloon pump was weaned and removed on 11/7. He has tolerated weaning from precedex for agitation and anxiety with PO ativan and seroquel, and uptitration of afterload reduction for GDMT. He is on aspirin and brillinta for DAPT as well as a heparin gtt for LV thrombus found on echo.       FOR FOLLOW UP:   [] cardiac MRI   [] GDMT    PAST MEDICAL & SURGICAL HISTORY:  HTN (hypertension)      CAD (coronary artery disease)  2009; stent      Intracranial hemorrhage  2008      Respiratory arrest  december 1st      Myocardial infarction, unspecified MI type, unspecified artery      History of coronary artery stent placement          Vital Signs Last 24 Hrs  T(C): 36.8 (08 Nov 2023 11:00), Max: 36.8 (08 Nov 2023 11:00)  T(F): 98.2 (08 Nov 2023 11:00), Max: 98.2 (08 Nov 2023 11:00)  HR: 110 (08 Nov 2023 16:15) (66 - 110)  BP: --  BP(mean): --  RR: 25 (08 Nov 2023 16:15) (15 - 38)  SpO2: 95% (08 Nov 2023 16:15) (90% - 97%)    Parameters below as of 08 Nov 2023 11:00  Patient On (Oxygen Delivery Method): room air      I&O's Summary    07 Nov 2023 07:01  -  08 Nov 2023 07:00  --------------------------------------------------------  IN: 1232.4 mL / OUT: 3705 mL / NET: -2472.6 mL    08 Nov 2023 07:01  -  08 Nov 2023 16:53  --------------------------------------------------------  IN: 708.2 mL / OUT: 1325 mL / NET: -616.8 mL      Allergies    penicillins (Unknown)    Intolerances      MEDICATIONS  (STANDING):  albuterol    0.083% 2.5 milliGRAM(s) Nebulizer daily  aspirin  chewable 81 milliGRAM(s) Oral daily  atorvastatin 80 milliGRAM(s) Oral at bedtime  budesonide  80 MICROgram(s)/formoterol 4.5 MICROgram(s) Inhaler 2 Puff(s) Inhalation two times a day  chlorhexidine 2% Cloths 1 Application(s) Topical daily  dextrose 50% Injectable 12.5 Gram(s) IV Push once  dextrose 50% Injectable 25 Gram(s) IV Push once  dextrose 50% Injectable 25 Gram(s) IV Push once  furosemide   Injectable 80 milliGRAM(s) IV Push daily  glucagon  Injectable 1 milliGRAM(s) IntraMuscular once  heparin  Infusion 1600 Unit(s)/Hr (17 mL/Hr) IV Continuous <Continuous>  hydrALAZINE 100 milliGRAM(s) Oral every 8 hours  insulin glargine Injectable (LANTUS) 8 Unit(s) SubCutaneous at bedtime  insulin lispro (ADMELOG) corrective regimen sliding scale   SubCutaneous three times a day before meals  insulin lispro (ADMELOG) corrective regimen sliding scale   SubCutaneous at bedtime  isosorbide   dinitrate Tablet (ISORDIL) 30 milliGRAM(s) Oral three times a day  nystatin    Suspension [id number] Unit(s) Oral every 6 hours  pantoprazole    Tablet 40 milliGRAM(s) Oral before breakfast  QUEtiapine 25 milliGRAM(s) Oral daily  sodium chloride 0.65% Nasal 1 Spray(s) Both Nostrils three times a day  spironolactone 25 milliGRAM(s) Oral daily  ticagrelor 90 milliGRAM(s) Oral every 12 hours  tiotropium 2.5 MICROgram(s) Inhaler 2 Puff(s) Inhalation daily    MEDICATIONS  (PRN):  dextrose Oral Gel 15 Gram(s) Oral once PRN Blood Glucose LESS THAN 70 milliGRAM(s)/deciliter  LORazepam     Tablet 0.5 milliGRAM(s) Oral every 6 hours PRN Anxiety      Adult Advanced Hemodynamics Last 24 Hrs  CVP(mm Hg): --  CVP(cm H2O): --  CO: --  CI: --  PA: --  PA(mean): --  PCWP: --  SVR: --  SVRI: --  PVR: --  PVRI: --                              11.5   8.05  )-----------( 212      ( 08 Nov 2023 01:36 )             34.4     11-08    131<L>  |  102  |  26<H>  ----------------------------<  139<H>  4.3   |  17<L>  |  1.41<H>    Ca    9.2      08 Nov 2023 01:36  Phos  3.8     11-08  Mg     2.2     11-08    TPro  6.7  /  Alb  3.2<L>  /  TBili  0.8  /  DBili  x   /  AST  37  /  ALT  48<H>  /  AlkPhos  46  11-08    PT/INR - ( 08 Nov 2023 03:23 )   PT: 12.2 sec;   INR: 1.11 ratio         PTT - ( 08 Nov 2023 08:22 )  PTT:80.6 sec  PHYSICAL EXAM:      ABG - ( 08 Nov 2023 01:01 )  pH, Arterial: 7.44  pH, Blood: x     /  pCO2: 29    /  pO2: 109   / HCO3: 20    / Base Excess: -3.4  /  SaO2: 97.9              Lactate: CICU Transfer Note    Transfer from: CICU    Transfer to: Telemetry    Accepting Physician: Dr. Lorenzana  Signout given to: Dr. Lorenzana, Medicine ACP: Rangel Guan [id number]    CCU COURSE: 58M with HTN, CAD (s/p PCI ), ICH, CVA , HFrEF (EF severely reduced ), asthma (required intubation in 2018), DM presenting with chest pressure and SOB for one week. He was told he had bilateral pneumonia as an outpatient and was started on antibiotics. On the night of presentation, he states that he was diaphoretic and nauseous. Patient is a poor historian, but per family he passed out briefly for a few seconds and had a heart rate of 180s. EMS was called, who defibrillated patient at his home (no strips available). Patient was initially taken to [facility] where he signed out AMA because he wanted to be seen at [facility]. On presentation to [facility] , he was brought urgently to the cath lab for left heart cath. He was intubated in the lab for respiratory distress i/s/o pulmonary edema. His LHC revealed pLAD 100% in-stent restenosis, mRCA 100% and a PCWP of 30. An IABP was placed and he was transferred to CICU for cardiogenic shock management likely s/t acute on chronic heart failure (ef 22%). On admission he was also incidentally found to be COVID +.     While in CICU, he was extubated on 11/3. His balloon pump was weaned and removed on . He has tolerated weaning from precedex for agitation and anxiety with PO ativan and seroquel, and uptitration of afterload reduction for GDMT. He is on aspirin and brillinta for DAPT as well as a heparin gtt for LV thrombus found on echo.       PAST MEDICAL & SURGICAL HISTORY:  HTN (hypertension)      CAD (coronary artery disease)  ; stent      Intracranial hemorrhage        Respiratory arrest        Myocardial infarction, unspecified MI type, unspecified artery      History of coronary artery stent placement      Vital Signs Last 24 Hrs  T(C): 36.8 (2023 11:00), Max: 36.8 (2023 11:00)  T(F): 98.2 (2023 11:00), Max: 98.2 (2023 11:00)  HR: 110 (2023 16:15) (66 - 110)  RR: 25 (2023 16:15) (15 - 38)  SpO2: 95% (2023 16:15) (90% - 97%)    Parameters below as of 2023 11:00  Patient On (Oxygen Delivery Method): room air      I&O's Summary    2023 07:01  -  2023 07:00  --------------------------------------------------------  IN: 1232.4 mL / OUT: 3705 mL / NET: -2472.6 mL    2023 07:01  -  2023 16:53  --------------------------------------------------------  IN: 708.2 mL / OUT: 1325 mL / NET: -616.8 mL      Allergies    penicillins (Unknown)    Intolerances      MEDICATIONS  (STANDING):  albuterol    0.083% 2.5 milliGRAM(s) Nebulizer daily  aspirin  chewable 81 milliGRAM(s) Oral daily  atorvastatin 80 milliGRAM(s) Oral at bedtime  budesonide  80 MICROgram(s)/formoterol 4.5 MICROgram(s) Inhaler 2 Puff(s) Inhalation two times a day  chlorhexidine 2% Cloths 1 Application(s) Topical daily  dextrose 50% Injectable 12.5 Gram(s) IV Push once  dextrose 50% Injectable 25 Gram(s) IV Push once  dextrose 50% Injectable 25 Gram(s) IV Push once  furosemide   Injectable 80 milliGRAM(s) IV Push daily  glucagon  Injectable 1 milliGRAM(s) IntraMuscular once  heparin  Infusion 1600 Unit(s)/Hr (17 mL/Hr) IV Continuous <Continuous>  hydrALAZINE 100 milliGRAM(s) Oral every 8 hours  insulin glargine Injectable (LANTUS) 8 Unit(s) SubCutaneous at bedtime  insulin lispro (ADMELOG) corrective regimen sliding scale   SubCutaneous three times a day before meals  insulin lispro (ADMELOG) corrective regimen sliding scale   SubCutaneous at bedtime  isosorbide   dinitrate Tablet (ISORDIL) 30 milliGRAM(s) Oral three times a day  nystatin    Suspension [id number] Unit(s) Oral every 6 hours  pantoprazole    Tablet 40 milliGRAM(s) Oral before breakfast  QUEtiapine 25 milliGRAM(s) Oral daily  sodium chloride 0.65% Nasal 1 Spray(s) Both Nostrils three times a day  spironolactone 25 milliGRAM(s) Oral daily  ticagrelor 90 milliGRAM(s) Oral every 12 hours  tiotropium 2.5 MICROgram(s) Inhaler 2 Puff(s) Inhalation daily    MEDICATIONS  (PRN):  dextrose Oral Gel 15 Gram(s) Oral once PRN Blood Glucose LESS THAN 70 milliGRAM(s)/deciliter  LORazepam     Tablet 0.5 milliGRAM(s) Oral every 6 hours PRN Anxiety                          11.5   8.05  )-----------( 212      ( 2023 01:36 )             34.4     11-08    131<L>  |  102  |  26<H>  ----------------------------<  139<H>  4.3   |  17<L>  |  1.41<H>    Ca    9.2      2023 01:36  Phos  3.8     11-08  Mg     2.2     11-08    TPro  6.7  /  Alb  3.2<L>  /  TBili  0.8  /  DBili  x   /  AST  37  /  ALT  48<H>  /  AlkPhos  46  11-08    PT/INR - ( 2023 03:23 )   PT: 12.2 sec;   INR: 1.11 ratio        PTT - ( 2023 08:22 )  PTT:80.6 sec  PHYSICAL EXAM:    ABG - ( 2023 01:01 )  pH, Arterial: 7.44  pH, Blood: x     /  pCO2: 29    /  pO2: 109   / HCO3: 20    / Base Excess: -3.4  /  SaO2: 97.9      REVIEW OF SYSTEMS:  CONSTITUTIONAL: No weakness, fevers or chills  EYES/ENT: No visual changes;  No vertigo or throat pain   NECK: No pain or stiffness  RESPIRATORY: No cough, wheezing, hemoptysis; No shortness of breath  CARDIOVASCULAR: No chest pain or palpitations  GASTROINTESTINAL: No abdominal or epigastric pain  No nausea, vomiting, or hematemesis; No diarrhea or constipation. No melena or hematochezia.  GENITOURINARY: No dysuria, frequency or hematuria  NEUROLOGICAL: No numbness or weakness  SKIN: No itching, rashes    ICU Vital Signs Last 24 Hrs  T(C): 36.8 (2023 17:00), Max: 36.8 (2023 11:00)  T(F): 98.2 (2023 17:00), Max: 98.2 (2023 11:00)  HR: 112 (2023 19:00) (66 - 112)  BP: 134/65 (2023 19:00) (134/65 - 134/80)  BP(mean): 91 (2023 19:00) (91 - 100)  ABP: 145/69 (2023 18:15) (95/50 - 145/69)  ABP(mean): 95 (2023 18:15) (68 - 95)  RR: 20 (2023 19:00) (15 - 38)  SpO2: 93% (2023 19:00) (90% - 97%)    O2 Parameters below as of 2023 18:00  Patient On (Oxygen Delivery Method): room air      CVP(mm Hg): 15 (23 @ 10:00) (5 - 31)  PA: 38/24 (23 @ 10:00) (22/6 - 215/204)  PA(mean): 28 (23 @ 10:00) (14 - 207)      I&O's Summary    2023 07:01  -  2023 07:00  --------------------------------------------------------  IN: 1232.4 mL / OUT: 3705 mL / NET: -2472.6 mL    2023 07:01  -  2023 19:19  --------------------------------------------------------  IN: 1033.2 mL / OUT: 1485 mL / NET: -451.8 mL      CAPILLARY BLOOD GLUCOSE  POCT Blood Glucose.: 133 mg/dL (2023 18:13)  ============================I/O===========================   I&O's Detail    2023 07:01  -  2023 07:00  --------------------------------------------------------  IN:    Dexmedetomidine: 492.7 mL    Heparin: 234 mL    Nitroprusside: 505.7 mL  Total IN: 1232.4 mL    OUT:    Indwelling Catheter - Urethral (mL): 3705 mL  Total OUT: 3705 mL    Total NET: -2472.6 mL      2023 07:01  -  2023 19:19  --------------------------------------------------------  IN:    Dexmedetomidine: 118.8 mL    Heparin: 187 mL    Nitroprusside: 7.4 mL    Oral Fluid: 720 mL  Total IN: 1033.2 mL    OUT:    Indwelling Catheter - Urethral (mL): 1485 mL  Total OUT: 1485 mL    Total NET: -451.8 mL  ============================ LABS =========================                      11.5   8.05  )-----------( 212      ( 2023 01:36 )             34.4         131<L>  |  102  |  26<H>  ----------------------------<  139<H>  4.3   |  17<L>  |  1.41<H>    Ca    9.2      2023 01:36  Phos  3.8       Mg     2.2         TPro  6.7  /  Alb  3.2<L>  /  TBili  0.8  /  DBili  x   /  AST  37  /  ALT  48<H>  /  AlkPhos  46      LIVER FUNCTIONS - ( 2023 01:36 )  Alb: 3.2 g/dL / Pro: 6.7 g/dL / ALK PHOS: 46 U/L / ALT: 48 U/L / AST: 37 U/L / GGT: x           PT/INR - ( 2023 03:23 )   PT: 12.2 sec;   INR: 1.11 ratio    PTT - ( 2023 08:22 )  PTT:80.6 sec    ABG - ( 2023 01:01 )  pH, Arterial: 7.44  pH, Blood: x     /  pCO2: 29    /  pO2: 109   / HCO3: 20    / Base Excess: -3.4  /  SaO2: 97.9      Blood Gas Arterial, Lactate: 0.8 mmol/L (23 @ 01:01)  Blood Gas Arterial, Lactate: 0.7 mmol/L (23 @ 17:40)  Blood Gas Arterial, Lactate: 0.8 mmol/L (23 @ 12:50)  Blood Gas Venous - Lactate: 0.7 mmol/L (23 @ 00:37)  Blood Gas Arterial, Lactate: 0.7 mmol/L (23 @ 00:37)  Blood Gas Venous - Lactate: 0.8 mmol/L (23 @ 20:15)  Blood Gas Venous - Lactate: 0.9 mmol/L (23 @ 17:50)  Blood Gas Arterial, Lactate: 1.0 mmol/L (23 @ 03:17)  Blood Gas Arterial, Lactate: 0.9 mmol/L (23 @ 01:47)  Blood Gas Arterial, Lactate: 1.0 mmol/L (23 @ 22:10)    Urinalysis Basic - ( 2023 01:36 )    Color: x / Appearance: x / SG: x / pH: x  Gluc: 139 mg/dL / Ketone: x  / Bili: x / Urobili: x   Blood: x / Protein: x / Nitrite: x   Leuk Esterase: x / RBC: x / WBC x   Sq Epi: x / Non Sq Epi: x / Bacteria: x  ======================Micro/Rad/Cardio=================  Telemetry: Reviewed   EKG: Reviewed  CXR: Reviewed  Culture: Reviewed   Echo:   Cath: Cardiac Cath Lab - Adult:   [facility]  Department of Cardiology  [address] [place] [address]  [phone]  Cath Lab Report -- Comprehensive Report  Patient: LD VALENCIA  Study date: 2017  Account number: [id number]  MR number: [id number]  : 1965  Gender: Male  Race: W  Case Physician(s):  Jairon Holguin M.D.  Referring Physician:  Luc Lynn M.D.  INDICATIONS: Unstable angina - CCS4.  HISTORY: The patient has a history of coronary artery disease. The patient  hashypertension and medication-treated dyslipidemia.  PROCEDURE:  --  Left heart catheterization with ventriculography.  --  Left coronary angiography.  --  Right coronary angiography.  TECHNIQUE: The risks and alternatives of the procedures and conscious  sedation were explained to the patient and informed consent was obtained.  Cardiac catheterization performed electively.  Local anesthetic given. Right radial artery access. A 6FR PRELUDE KIT was  inserted in the vessel. Left heart catheterization. Ventriculography was  performed. A 5FR FR4.0 EXPO catheter was utilized. Left coronary artery  angiography. The vessel was injected utilizing a 5FR FL3.5 EXPO catheter.  Right coronary artery angiography. The vessel was injected utilizing a 5FR  FR4.0 EXPO catheter. RADIATION EXPOSURE: 1.1 min.  CONTRAST GIVEN: Omnipaque 55 ml.  MEDICATIONS GIVEN: Midazolam, 1 mg, IV. Fentanyl, 25 mcg, IV. Verapamil  (Isoptin, Calan, Covera), 2.5 mg, IA. Heparin, 3000 units, IA.  VENTRICLES: Global left ventricular function was moderately depressed. EF  estimated was 40 %.  CORONARY VESSELS: The coronary circulation is right dominant.  LM:   --  LM: Normal.  LAD:   --  Proximal LAD: There was a 50 % stenosis.  CX:   --  Circumflex: Normal.  RCA:   --  Mid RCA: There was a 40 % stenosis.  --  Distal RCA: There was a 50 % stenosis.  COMPLICATIONS: There were no complications.  DIAGNOSTIC RECOMMENDATIONS: The patient should continue with the present  medications.  Prepared and signed by  Jairon Holguin M.D.  Signed 2017 12:20:13  HEMODYNAMIC TABLES  Pressures:  Baseline/ Room Air  Pressures:  - HR: 78  Pressures:  - Rhythm:  Pressures:  -- Aortic Pressure (S/D/M): --/--/99  Pressures:  -- Left Ventricle (s/edp): 157/39/--  Outputs:  Baseline/ Room Air  Outputs:  -- CALCULATIONS[age]  Outputs:  -- CALCULATIONS: Body Surface Area: 2.05  Outputs:  -- CALCULATIONS: Height in cm: 175.00  Outputs:  -- CALCULATIONS: Sex: Male  Outputs:  -- CALCULATIONS: Weight in k.40 (17 @ 21:55)  ======================================================  PAST MEDICAL & SURGICAL HISTORY:  HTN (hypertension)      CAD (coronary artery disease)  ; stent      Intracranial hemorrhage        Respiratory arrest        Myocardial infarction, unspecified MI type, unspecified artery      History of coronary artery stent placement    A/P: 57 yo M with HTN, CAD (s/p PCI ), HFrEF, CVA , and T2DM presenting with chest pressure and unknown tachycardia that was shocked x1, LHC  found to have in-stent restenosis of pLAD and  of RCA with elevated RA and PA pressures and severely decreased EF 32%, s/p IABP  and admitted to CICU for management of cardiogenic shock.    NEURO  # Hx of CVA  - A&O x3  - No neuro deficits known     # Anxiety  - Takes 0.5 ativan PRN at home, c/w ativan 0.5 q6 hours ATCx3 days then PRN  - Wean off precedex as tolerated  - f/u psych consult for medical management of anxiety    PULM  #AHRF   - 2/2 pulmonary edema requiring Intubation, resolved  - Extubated 11/3, saturating well on room air  - continue to monitor sp02    # COVID +  - Maintain Airborne precautions  - Defer to ID for remdesivir or steroids    # Asthma (hx of respiratory failure in 2018- intubated for 20 minutes per chart review pt report)  - c/w albuterol, symbicort and spiriva  - on trelegy at home    CV  # Cardiogenic shock requiring IABP (- )  - likely 2/2 NSTEMI and ADHF  -  LHC: pLAD 100 % in-stent restenosis & mRCA, 100 %. PCWP 30  -  TTE: LV dilated. EF 32 %. Regional WMAs present, mod (grade 2) LV diastolic dysfunction  -  TTE: EF 22% and + LV thrombus   - s/p lasix 80 this am, appears euvolemic on exam  - IABP removed   - Hydralazine increased to 75 from 40 overnight and c/w isordil for AL reduction  - nipride weaned off this am  - continue to monitor perfusion indices daily  - continue Strict I&O, goal net negative  - Daily weights  - Fluid restriction    # Stent re-occlusion of pLAD and 100%  of RCA  - EKG on admission w/ LBBB  - DAPT: c/w ASA, brillinta   - c/w lipitor 80  - CT sx not recommending CABG, undergoing AT eval  - Viability study when IABP removed    # LV thrombus  - c/w heparin gtt    # HTN  - afterload reduction as above    /RENAL  # ARIC  - sCr downtrending, Baseline Cr: 1-1.22  - diuresis as above  - Trend BMP, lytes daily, replace as needed  - continue Strict I/Os, avoid nephrotoxins    GI  # Transaminitis  - Likely iso cardiogenic shock  - Improving, continue to trend daily    # GERD  - c/w home protonix  - Tolerating diet  -      # Bowel regimen  - miralax and senna d/c'd iso loose stools    ENDO  # Type 2 DM  - A1c 8.3  - c/w lantus and ISS  - -160s    HEME  # VTE prophylaxis   - H/H stable  - c/w heparin gtt    ID  # Concern for aspiration event prior to intubation  - Right sided opacification on CXR , clear today   - Last fever  (100.6)  - Blood cultures  and  NGTD  - UA  negative  - MRSA swab negative  - Continue to monitor off ABx (s/p vanco and cefepime )  - ID following    # COVID  - Maintain airborne precautions    GOC  - Full code  ======================= DISPOSITION  =====================  [ ] Critical   [ ] Guarded    [X] Stable    [ ] Maintain in CICU  [X] Downgrade to Telemetry  [ ] Discharge Home      FOR FOLLOW UP:  [] HF to follow    [] cardiac MRI   [] GDMT      Rita Hawthorne ACN  CICU [phone]

## 2023-11-08 NOTE — BH CONSULTATION LIAISON ASSESSMENT NOTE - RISK ASSESSMENT
Risk factors:  Acute pain    Protective factors: no current SIIP/HIIP, no h/o SA/SIB, no h/o psych admissions, no access to weapons, no active substance abuse,  no psychosis, engaged in work, domiciled, intact marriage, social supports, positive therapeutic relationship, engaged in treatment, compliant with treatment, help-seeking behaviors    Overall, pt is a low risk of harm to self/others and does not meet criteria for psychiatric admission.

## 2023-11-08 NOTE — PROGRESS NOTE ADULT - ASSESSMENT
ASSESSMENT  57 yo M with HTN, CAD (s/p PCI 2008), HFrEF, CVA 2018, and T2DM presenting with chest pressure and unknown tachycardia that was shocked x1, LHC 11/1 found to have in-stent restenosis of pLAD and  of RCA with elevated RA and PA pressures and severely decreased EF 32%, s/p IABP 11/1 and admitted to CICU for management of cardiogenic shock.    NEURO  -  A&Ox3    # Hx of CVA  - No neuro deficits known    # Anxiety  - Takes 0.5 ativan PRN at home  - c/w ativan 0.5 q6 hours ATCx3 days then PRN  - Wean off precedex  - f/u psych consult for medical management of anxiety    PULM  # AHRF 2/2 pulmonary edema requiring Intubation   - Extubated 11/3  - Wean O2 as tolerated, currently on 2L NC    # COVID +  - Maintain Airborne precautions  - Defer to ID for remdesivir or steroids    # Asthma (hx of respiratory failure in 2018- intubated for 20 minutes per chart review pt report)  - c/w albuterol qd  - c/w home trelegy     CV  # Cardiogenic shock 2/2 HF exacerbation requiring IABP (11/1-)  - 11/1 LH: pLAD 100 % in-stent restenosis & mRCA, 100 %. PCWP 30  - 11/1 TTE: LV dilated. EF 32 %. Regional WMAs present, mod (grade 2) LV diastolic dysfunction  - 11/2 TTE: EF 22% and + LV thrombus   - Per echo areas of akinesis are not new compared to 2017, but EF is severely decreased  - Currently on IABP 1:1, successfully weaned, plan to remove today  - Titrate off nipride after IABP removal  - Hydralazine increased to 75 from 40 overnight and c/w isordil for AL reduction  - Trend CVP  - Daily CXR for IABP  - Keep right leg straight while sheaths/IABP/[place] are in position     # HFrEF w/ severely reduced EF 22%  - s/p lasix 40 11/1  - s/p lasix 80 11/6, c/w daily  - Start GDMT when stable  - CXR 11/7 clear   - Strict I&O, currently net negative  - Daily weights  - Fluid restriction  - On ozempic and dapaglifozin at home    # Stent re-occlusion of pLAD and 100%  of RCA  - CE cleared  - EKG here w/ LBBB  - c/w ASA, brillinta (takes plavix at home?)  - c/w lipitor 80  - c/w heparin gtt  - CT sx not recommending CABG, instead AT eval  - Viability study when IABP removed    # LV thrombus  - Seen on TTE 11/2  - c/w heparin gtt    # HTN  - Holding home losartan 25, HCTZ 25, coreg 25 for now    /RENAL  # ARIC  - Baseline Cr: 1-1.22, currently 1.48, stable  - Trend BMP, lytes and UOP  - Strict I/Os  - Avoid nephrotoxins    # Metabolic acidosis  - Likely 2/2 lactate of 3.7, due to cardiogenic shock  - Resolved    GI  # Transaminitis  - Likely iso cardiogenic shock  - AST/ALT: 94/50 on admission  - Improving, continue to trend    # GERD  - c/w home protonix  - Tolerating diet  - BM 11/6    # Bowel regimen  - miralax and senna d/c'd iso loose stools    ENDO  # Type 2 DM  - A1c 8.3  - c/w lantus and ISS  - -160s    HEME  # VTE prophylaxis   - H/H stable  - c/w heparin gtt    ID  # Concern for aspiration event prior to intubation  - Right sided opacification on CXR 11/2, clear today   - Last fever 11/4 (100.6)  - Blood cultures 11/2 and 11/4 NGTD  - UA 11/4 negative  - MRSA swab negative  - Continue to monitor off ABx (s/p vanco and cefepime 11/2)  - ID following    # COVID  - Maintain airborne precautions    GOC  - Full code  - Remain in ICU     ASSESSMENT  59 yo M with HTN, CAD (s/p PCI 2008), HFrEF, CVA 2018, and T2DM presenting with chest pressure and unknown tachycardia that was shocked x1, LHC 11/1 found to have in-stent restenosis of pLAD and  of RCA with elevated RA and PA pressures and severely decreased EF 32%, s/p IABP 11/1 and admitted to CICU for management of cardiogenic shock.    NEURO  -  A&Ox3  # Hx of CVA  - No neuro deficits known    # Anxiety  - Takes 0.5 ativan PRN at home  - c/w ativan 0.5 q6 hours ATCx3 days then PRN  - Wean off precedex as tolerated  - f/u psych consult for medical management of anxiety    PULM  # AHRF 2/2 pulmonary edema requiring Intubation   - Extubated 11/3  - Wean O2 as tolerated, currently on 2L NC    # COVID +  - Maintain Airborne precautions  - Defer to ID for remdesivir or steroids    # Asthma (hx of respiratory failure in 2018- intubated for 20 minutes per chart review pt report)  - c/w albuterol qd  - c/w home trelegy     CV  # Cardiogenic shock 2/2 HF exacerbation requiring IABP (11/1-)  - 11/1 LHC: pLAD 100 % in-stent restenosis & mRCA, 100 %. PCWP 30  - 11/1 TTE: LV dilated. EF 32 %. Regional WMAs present, mod (grade 2) LV diastolic dysfunction  - 11/2 TTE: EF 22% and + LV thrombus   - s/p lasix 80 this am, appears euvolemic on exam  - IABP removed 11/7  - Hydralazine increased to 75 from 40 overnight and c/w isordil for AL reduction  - nipride weaned off this am  - continue to monitor perfusion indices daily  - Strict I&O, currently net negative  - Daily weights  - Fluid restriction    # Stent re-occlusion of pLAD and 100%  of RCA  - CE cleared  - EKG here w/ LBBB  - c/w ASA, brillinta (takes plavix at home?)  - c/w lipitor 80  - c/w heparin gtt  - CT sx not recommending CABG, instead AT eval  - Viability study when IABP removed    # LV thrombus  - Seen on TTE 11/2  - c/w heparin gtt    # HTN  - Holding home losartan 25, HCTZ 25, coreg 25 for now    /RENAL  # ARIC  - Baseline Cr: 1-1.22, currently 1.48, stable  - Trend BMP, lytes and UOP  - Strict I/Os  - Avoid nephrotoxins    # Metabolic acidosis  - Likely 2/2 lactate of 3.7, due to cardiogenic shock  - Resolved    GI  # Transaminitis  - Likely iso cardiogenic shock  - AST/ALT: 94/50 on admission  - Improving, continue to trend    # GERD  - c/w home protonix  - Tolerating diet  - BM 11/6    # Bowel regimen  - miralax and senna d/c'd iso loose stools    ENDO  # Type 2 DM  - A1c 8.3  - c/w lantus and ISS  - -160s    HEME  # VTE prophylaxis   - H/H stable  - c/w heparin gtt    ID  # Concern for aspiration event prior to intubation  - Right sided opacification on CXR 11/2, clear today   - Last fever 11/4 (100.6)  - Blood cultures 11/2 and 11/4 NGTD  - UA 11/4 negative  - MRSA swab negative  - Continue to monitor off ABx (s/p vanco and cefepime 11/2)  - ID following    # COVID  - Maintain airborne precautions    GOC  - Full code  - Remain in ICU     ASSESSMENT  57 yo M with HTN, CAD (s/p PCI 2008), HFrEF, CVA 2018, and T2DM presenting with chest pressure and unknown tachycardia that was shocked x1, LHC 11/1 found to have in-stent restenosis of pLAD and  of RCA with elevated RA and PA pressures and severely decreased EF 32%, s/p IABP 11/1 and admitted to CICU for management of cardiogenic shock.    NEURO  # Hx of CVA  - A&O x3  - No neuro deficits known     # Anxiety  - Takes 0.5 ativan PRN at home, c/w ativan 0.5 q6 hours ATCx3 days then PRN  - Wean off precedex as tolerated  - f/u psych consult for medical management of anxiety    PULM  #AHRF   - 2/2 pulmonary edema requiring Intubation, resolved  - Extubated 11/3, saturating well on room air  - continue to monitor sp02    # COVID +  - Maintain Airborne precautions  - Defer to ID for remdesivir or steroids    # Asthma (hx of respiratory failure in 2018- intubated for 20 minutes per chart review pt report)  - c/w albuterol, symbicort and spiriva  - on trelegy at home    CV  # Cardiogenic shock requiring IABP (11/1- 11/7)  - likely 2/2 NSTEMI and ADHF  - 11/1 LHC: pLAD 100 % in-stent restenosis & mRCA, 100 %. PCWP 30  - 11/1 TTE: LV dilated. EF 32 %. Regional WMAs present, mod (grade 2) LV diastolic dysfunction  - 11/2 TTE: EF 22% and + LV thrombus   - s/p lasix 80 this am, appears euvolemic on exam  - IABP removed 11/7  - Hydralazine increased to 75 from 40 overnight and c/w isordil for AL reduction  - nipride weaned off this am  - continue to monitor perfusion indices daily  - continue Strict I&O, goal net negative  - Daily weights  - Fluid restriction    # Stent re-occlusion of pLAD and 100%  of RCA  - EKG on admission w/ LBBB  - DAPT: c/w ASA, brillinta   - c/w lipitor 80  - CT sx not recommending CABG, undergoing AT eval  - Viability study when IABP removed    # LV thrombus  - c/w heparin gtt    # HTN  - afterload reduction as above    /RENAL  # ARIC  - sCr downtrending, Baseline Cr: 1-1.22  - diuresis as above  - Trend BMP, lytes daily, replace as needed  - continue Strict I/Os, avoid nephrotoxins    GI  # Transaminitis  - Likely iso cardiogenic shock  - Improving, continue to trend daily    # GERD  - c/w home protonix  - Tolerating diet  - BM 11/6    # Bowel regimen  - miralax and senna d/c'd iso loose stools    ENDO  # Type 2 DM  - A1c 8.3  - c/w lantus and ISS  - -160s    HEME  # VTE prophylaxis   - H/H stable  - c/w heparin gtt    ID  # Concern for aspiration event prior to intubation  - Right sided opacification on CXR 11/2, clear today   - Last fever 11/4 (100.6)  - Blood cultures 11/2 and 11/4 NGTD  - UA 11/4 negative  - MRSA swab negative  - Continue to monitor off ABx (s/p vanco and cefepime 11/2)  - ID following    # COVID  - Maintain airborne precautions    GOC  - Full code  - Remain in ICU

## 2023-11-08 NOTE — PROGRESS NOTE ADULT - PROBLEM SELECTOR PLAN 1
- S/p IABP and nipride   - Inotrope: Low threshold after removing IABP   - Dec hydral 75/ ISDN 30 TID --> 50/20 TID   - Aldactone 25mg daily  - Start losartan 25 QDay (11/8)  - Cont Lasix 80mg IV.  CVP goal <10  - Trend perfusion markers daily, MVO2, BMP, Lactate and LFT's  - Strict I/O's.

## 2023-11-08 NOTE — BH CONSULTATION LIAISON ASSESSMENT NOTE - HPI (INCLUDE ILLNESS QUALITY, SEVERITY, DURATION, TIMING, CONTEXT, MODIFYING FACTORS, ASSOCIATED SIGNS AND SYMPTOMS)
59 yo employed  male, domiciled in a house with wife, with PPH of anxiety (Lorazepam 0.25mg prn), no prior psych admission, and PMHx of HTN, CAD w/ 1 stent in 2009, ICH (2008) presenting with abn ekg, NSTEMI. Patient was transferred from [facility] to [facility] for cath, was intubated prior to cath for dyspneic and diaphoretic so was intubated, and currently COVID positive. Psych was consulted for irritability and aggressiveness.     Upon interview, pt is AO x 4 and calm. No irritability noted. Pt states that he is a calm man, and respects everyone here at the hospital for their jobs. Buys everyone food on the floor. Pt reports that he got annoyed when the nurses and providers were "not listening to me, when I told them I have a heart attack." He does not appreciate when the nurse told him "This is my job, I have to do this." States that he got very frustrated when he kindly told one of the nurses, "Please leave" his room multiple times, but the nurse disregarded his request. Pt wants to be transferred to [facility], because he does not want to stay at this hospital d/t bad bedside manners. Pt expresses "I don't care about this heart issue and everything else," and mainly wishing for his anxiety to be resolved, because "it has taken over my whole life." He states that he is always anxious and has been going on for years. Taking Lorazepam 0.25mg at home, but is currently on Lorazepam 0.5mg Q6 hrs, and he is requesting to stay on the same regimen.     Pt endorses that his mom has anxiety, but unsure if she is on any medications because she lives [place]. He is  to his wife, behavioral therapist, as per pt. Has 3 children. 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 Resides in a house in [place], but states that he has multiple houses in [place] and [place]. Denies any smoking, drinking, and illicit drug use. Denies any SI/SA/HI/VH/AH. Denies any firearms.  59 yo employed  male, domiciled in a house with wife, with PPH of anxiety (Lorazepam 0.25mg prn), no prior psych admission, and PMHx of HTN, CAD w/ 1 stent in 2009, ICH (2008) presenting with abn ekg, NSTEMI. Patient was transferred from [facility] to [facility] for cath, was intubated prior to cath for dyspneic and diaphoretic so was intubated, and currently COVID positive. Psych was consulted for irritability and aggressiveness.     Upon interview, pt is AO x 4 and calm. No irritability noted. Pt states that he is a calm man, and respects everyone here at the hospital for their jobs. Buys everyone food on the floor. Pt reports that he got annoyed when the nurses and providers were "not listening to me, when I told them I have a heart attack." He does not appreciate when the nurse told him "This is my job, I have to do this." States that he got very frustrated when he kindly told one of the nurses, "Please leave" his room multiple times, but the nurse disregarded his request. Pt expresses "I don't care about this heart issue and everything else," and mainly wishing for his anxiety to be resolved, because "it has taken over my whole life." He states that he is always anxious and has been going on for years. Taking Lorazepam 0.25mg at home, but is currently on Lorazepam 0.5mg Q6 hrs, and he is requesting to stay on the same regimen.     Pt endorses that his mom has anxiety, but unsure if she is on any medications because she lives overseas. He is  to his wife, behavioral therapist, as per pt. Has 3 children. 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 Resides in a house in [place], but states that he has multiple houses in [place] and [place]. Denies any smoking, drinking, and illicit drug use. Denies any SI/SA/HI/VH/AH. Denies any firearms.

## 2023-11-08 NOTE — RAPID RESPONSE TEAM SUMMARY - NSADDTLFINDINGSRRT_GEN_ALL_CORE
Per family/RN, pt had seizure like activity and then lost pulse. Code blue called. CPR started and epi x2, bicarb x1 given for PEA arrest. Pt then went into Vfib, shock delivered x3, lidocaine x1, amio x1 and mg x1 given. Achieved ROSC, initial /76 then decreased to 77/52. Neox1 given and levo started. Last BP during rapid 133/79.

## 2023-11-08 NOTE — BH CONSULTATION LIAISON ASSESSMENT NOTE - NSBHCHARTREVIEWVS_PSY_A_CORE FT
Vital Signs Last 24 Hrs  T(C): 36.7 (08 Nov 2023 04:00), Max: 36.7 (07 Nov 2023 20:00)  T(F): 98 (08 Nov 2023 04:00), Max: 98.1 (07 Nov 2023 20:00)  HR: 91 (08 Nov 2023 10:30) (65 - 93)  BP: --  BP(mean): --  RR: 20 (08 Nov 2023 10:30) (15 - 38)  SpO2: 93% (08 Nov 2023 10:30) (90% - 97%)    Parameters below as of 08 Nov 2023 08:20  Patient On (Oxygen Delivery Method): room air

## 2023-11-08 NOTE — CHART NOTE - NSCHARTNOTEFT_GEN_A_CORE
Patient is a 58y old  Male who presents with a chief complaint of Mix Cardiogenic Septic shock. Received CICU downgrade. as per report patient was admitted to [facility] 11/1, he was brought urgently to the cath lab for left heart cath. He was intubated in the lab for respiratory distress i/s/o pulmonary edema. His LHC revealed pLAD 100% in-stent restenosis, mRCA 100% and a PCWP of 30. An IABP was placed and he was transferred to CICU for cardiogenic shock management likely s/t acute on chronic heart failure (ef 22%). Patient was on balloon pump was weaned and removed on 11/7. He has tolerated weaning. transferred to [address] [place] [address]. On report from CICU provider patient heart rate fluctuates and can become taccy up to low 100's to 120 bpm tolerable as patient is compensating. patient is stable in COVID room with family.      Vital Signs Last 24 Hrs  T(C): 37.2 (08 Nov 2023 22:02), Max: 37.2 (08 Nov 2023 19:00)   T(F): 99 (08 Nov 2023 22:02), Max: 99 (08 Nov 2023 19:00)  HR: 108 (08 Nov 2023 22:02) (68 - 112)  BP: 117/77 (08 Nov 2023 22:02) (117/77 - 134/80)  BP(mean): 83 (08 Nov 2023 21:00) (83 - 100)  RR: 19 (08 Nov 2023 22:02) (15 - 26)  SpO2: 92% (08 Nov 2023 22:02) (90% - 99%)                Follow up with Attending in AM.

## 2023-11-08 NOTE — PROGRESS NOTE ADULT - SUBJECTIVE AND OBJECTIVE BOX
LD VALENCIA  58y Male  MRN:[id number]    Patient is a 58y old  Male who presents with a chief complaint of NSTEMI (01 Nov 2023 20:29)    HPI:  57yo M w/ hx HTN, CAD w/ 1 stent in 2009, ICH (2008) presenting with abn ekg. Patient presented to [facility] where he was found to have STEMI, recommended to get cath however patient did not want to get it there so it left and came here.  Patient initially had cough, congestion, fever, was placed on antibiotics on Sunday.  Started feeling nauseous and had a presyncopal event after which he presented to ED last night.  Had chest pain as well.  Chest pain is midsternal.  Not currently having chest pain.  Received 4 aspirin 30 min pta. (01 Nov 2023 15:11)      Patient seen and evaluated at bedside in CCU. interval events noted    Interval HPI: agitated o/n    PAST MEDICAL & SURGICAL HISTORY:  HTN (hypertension)      CAD (coronary artery disease)  2009; stent      Intracranial hemorrhage  2008      Respiratory arrest  december 1st      Myocardial infarction, unspecified MI type, unspecified artery      History of coronary artery stent placement          REVIEW OF SYSTEMS:  as per hpi     VITALS:   ICU Vital Signs Last 24 Hrs  T(C): 36.8 (08 Nov 2023 11:00), Max: 36.8 (08 Nov 2023 11:00)  T(F): 98.2 (08 Nov 2023 11:00), Max: 98.2 (08 Nov 2023 11:00)  HR: 106 (08 Nov 2023 17:00) (66 - 110)  BP: --  BP(mean): --  ABP: 124/61 (08 Nov 2023 17:00) (92/52 - 124/74)  ABP(mean): 82 (08 Nov 2023 17:00) (63 - 95)  RR: 23 (08 Nov 2023 17:00) (15 - 38)  SpO2: 95% (08 Nov 2023 17:00) (90% - 97%)    O2 Parameters below as of 08 Nov 2023 11:00  Patient On (Oxygen Delivery Method): room air           PHYSICAL EXAM:  GENERAL: NAD, well-developed  HEAD:  Atraumatic, Normocephalic  EYES: EOMI, PERRLA, conjunctiva and sclera clear  NECK: Supple, No JVD  CHEST/LUNG: Clear to auscultation bilaterally; No wheeze  HEART: Regular rate and rhythm; No murmurs, rubs, or gallops  ABDOMEN: Soft, Nontender, Nondistended; Bowel sounds present  EXTREMITIES:  2+ Peripheral Pulses, No clubbing, cyanosis, or edema  PSYCH: AAOx3  NEUROLOGY: non-focal  SKIN: No rashes or lesions    Consultant(s) Notes Reviewed:  [x ] YES  [ ] NO  Care Discussed with Consultants/Other Providers [ x] YES  [ ] NO    MEDS:   MEDICATIONS  (STANDING):  albuterol    0.083% 2.5 milliGRAM(s) Nebulizer daily  aspirin  chewable 81 milliGRAM(s) Oral daily  atorvastatin 80 milliGRAM(s) Oral at bedtime  budesonide  80 MICROgram(s)/formoterol 4.5 MICROgram(s) Inhaler 2 Puff(s) Inhalation two times a day  chlorhexidine 2% Cloths 1 Application(s) Topical daily  dextrose 50% Injectable 12.5 Gram(s) IV Push once  dextrose 50% Injectable 25 Gram(s) IV Push once  dextrose 50% Injectable 25 Gram(s) IV Push once  furosemide   Injectable 80 milliGRAM(s) IV Push daily  glucagon  Injectable 1 milliGRAM(s) IntraMuscular once  heparin  Infusion 1600 Unit(s)/Hr (17 mL/Hr) IV Continuous <Continuous>  hydrALAZINE 100 milliGRAM(s) Oral every 8 hours  insulin glargine Injectable (LANTUS) 8 Unit(s) SubCutaneous at bedtime  insulin lispro (ADMELOG) corrective regimen sliding scale   SubCutaneous three times a day before meals  insulin lispro (ADMELOG) corrective regimen sliding scale   SubCutaneous at bedtime  isosorbide   dinitrate Tablet (ISORDIL) 30 milliGRAM(s) Oral three times a day  nystatin    Suspension 284030 Unit(s) Oral every 6 hours  pantoprazole    Tablet 40 milliGRAM(s) Oral before breakfast  QUEtiapine 25 milliGRAM(s) Oral daily  sodium chloride 0.65% Nasal 1 Spray(s) Both Nostrils three times a day  spironolactone 25 milliGRAM(s) Oral daily  ticagrelor 90 milliGRAM(s) Oral every 12 hours  tiotropium 2.5 MICROgram(s) Inhaler 2 Puff(s) Inhalation daily    MEDICATIONS  (PRN):  dextrose Oral Gel 15 Gram(s) Oral once PRN Blood Glucose LESS THAN 70 milliGRAM(s)/deciliter  LORazepam     Tablet 0.5 milliGRAM(s) Oral every 6 hours PRN Anxiety      ALLERGIES:  penicillins (Unknown)      LABS:                                                                                             11.5   8.05  )-----------( 212      ( 08 Nov 2023 01:36 )             34.4   11-08    131<L>  |  102  |  26<H>  ----------------------------<  139<H>  4.3   |  17<L>  |  1.41<H>    Ca    9.2      08 Nov 2023 01:36  Phos  3.8     11-08  Mg     2.2     11-08    TPro  6.7  /  Alb  3.2<L>  /  TBili  0.8  /  DBili  x   /  AST  37  /  ALT  48<H>  /  AlkPhos  46  11-08       < from: Xray Chest 1 View- PORTABLE-Urgent (11.01.23 @ 07:42) >    IMPRESSION:  Clear lungs.    ---End of Report ---        < end of copied text >  < from: TTE W or WO Ultrasound Enhancing Agent (11.01.23 @ 10:23) >  _____________________________     CONCLUSIONS:      1. Left ventricular cavity is moderately dilated. Left ventricular wall thickness is normal. Left ventricular systolic function is severely decreased with an ejection fraction of 32 % by Chinchilla's method of disks. Regional wall motion abnormalities present.   2. Multiple segmental abnormalities exist. See findings.   3. There is moderate (grade 2) left ventricular diastolic dysfunction, with indeterminant filling pressure.   4. Normal right ventricular cavity size, wall thickness, and systolic function.   5. No significant valvular disease.   6. No pericardial effusion seen.   7. Compared to the transthoracic echocardiogram performed on 1/25/2017 the areas of akinesis are unchanged but there has been a decline in LV systolic function with new areas of hypokinesis.    __________________________________________________________________    < end of copied text >  < from: TTE Limited W or WO Ultrasound Enhancing Agent (11.02.23 @ 07:41) >  __________________________     CONCLUSIONS:      1. After obtaining consent, Definity ultrasound enhancing agent was given for enhanced left ventricular opacification and improved delineation of the left ventricular endocardial borders. Left ventricular systolic function is severely decreased with a calculated ejection fraction of 22 % by the Chinchilla's biplane method of disks. There is a left ventricular thrombus.   2. Findings were discussed with Litzy BOSS on 11/2/2023 at 8.49am.   3. There is a left ventricular thrombus.    _________________________________________________________________    < end of copied text >

## 2023-11-08 NOTE — PROGRESS NOTE ADULT - PROVIDER SPECIALTY LIST ADULT
Patient is unable to make the offered apt (yesterday). Will assist in rescheduling. Internal Medicine

## 2023-11-08 NOTE — BH CONSULTATION LIAISON ASSESSMENT NOTE - CURRENT MEDICATION
MEDICATIONS  (STANDING):  albuterol    0.083% 2.5 milliGRAM(s) Nebulizer daily  aspirin  chewable 81 milliGRAM(s) Oral daily  atorvastatin 80 milliGRAM(s) Oral at bedtime  budesonide  80 MICROgram(s)/formoterol 4.5 MICROgram(s) Inhaler 2 Puff(s) Inhalation two times a day  chlorhexidine 2% Cloths 1 Application(s) Topical daily  dexMEDEtomidine Infusion 0.5 MICROgram(s)/kG/Hr (12.4 mL/Hr) IV Continuous <Continuous>  dextrose 50% Injectable 12.5 Gram(s) IV Push once  dextrose 50% Injectable 25 Gram(s) IV Push once  dextrose 50% Injectable 25 Gram(s) IV Push once  furosemide   Injectable 80 milliGRAM(s) IV Push daily  glucagon  Injectable 1 milliGRAM(s) IntraMuscular once  heparin  Infusion 1600 Unit(s)/Hr (17 mL/Hr) IV Continuous <Continuous>  hydrALAZINE 100 milliGRAM(s) Oral every 8 hours  insulin glargine Injectable (LANTUS) 8 Unit(s) SubCutaneous at bedtime  insulin lispro (ADMELOG) corrective regimen sliding scale   SubCutaneous three times a day before meals  insulin lispro (ADMELOG) corrective regimen sliding scale   SubCutaneous at bedtime  isosorbide   dinitrate Tablet (ISORDIL) 30 milliGRAM(s) Oral three times a day  nystatin    Suspension 757190 Unit(s) Oral every 6 hours  pantoprazole    Tablet 40 milliGRAM(s) Oral before breakfast  sodium chloride 0.65% Nasal 1 Spray(s) Both Nostrils three times a day  spironolactone 25 milliGRAM(s) Oral daily  ticagrelor 90 milliGRAM(s) Oral every 12 hours  tiotropium 2.5 MICROgram(s) Inhaler 2 Puff(s) Inhalation daily    MEDICATIONS  (PRN):  dextrose Oral Gel 15 Gram(s) Oral once PRN Blood Glucose LESS THAN 70 milliGRAM(s)/deciliter

## 2023-11-08 NOTE — BH CONSULTATION LIAISON ASSESSMENT NOTE - NSBHATTESTCOMMENTATTENDFT_PSY_A_CORE
Pt is a 59 y/o  white male with hx of anxiety, takes ativan at home as needed, presents with NSTEMI from outside hospital, and psych called for agitation and irrritable mood. agree with student's A/P above, pt feels at times frustrated being here, and is not intentionally trying to be difficult with staff, per patient. pt reports motivation to get better, and indicates going forward he will do his best to be more diligent and respectful when communicating with staff. pt denies depression, has periods of anxiety, doing well with PRN ativan. no si/hi. no manic or psychotic symptoms present.

## 2023-11-08 NOTE — PROGRESS NOTE ADULT - CRITICAL CARE ATTENDING COMMENT
History of CAD s/p PCI  Admitted with cardiogenic shock and respiratory failure in the setting of stent restenosis  A+Ox3, requiring Ativan and Precedex due to extreme anxiety with ? delusions   Start Seroquel to try and wean off of Precedex, consult Psych  Cardiogenic shock requiring Nipride and Bidil for afterload reduction, IABP removed yesterday  SvO2 70s with IABP weaning overnight - remove IABP  Continue DAPT with ASA/Ticagrelor  O2 sats mid 90s on room air  DASH/diabetic diet  Non-oliguric ARIC, CVP is elevated - continue diuresis with IV Lasix to target 1-2L overall negative   H/H low but acceptable on Heparin drip for LV thrombus  Afebrile, no antibiotics, +COVID - defer Remdesivir per ID   Sugars controlled on Lantus  Vidya 11/1, has a Llamas but the patient refused removal    Overnight the patient was very combative, threatening and cursing at the staff while calling everyone incompetent and saying that he will call his . He has been refusing multiple interventions while becoming hyperfocused on certain things like making sure he takes enough Imodium prior to MRI so he does not have a bowel movement during the test. In addition to consulting Psych, we will also call Patient Relations.

## 2023-11-08 NOTE — PROGRESS NOTE ADULT - SUBJECTIVE AND OBJECTIVE BOX
Patient seen and examined at bedside.    Overnight Events: Pt still having intermittent interpersonal issues with staff. Irritable this am, stated to me that he was anxious and staff got confrontational with him. We discussed the importance of working together with care team and taking his meds. He denies any CP SOB. Pt eager to get CMR, explained that it should be done 7-10 days after his event to limit false positive LGE iso edema. Will plan to dec hydral/ISDN to 50/20 TID, start losartan 25 QDay     Review of Systems: Negative except as per HPI     Current Meds:  albuterol    0.083% 2.5 milliGRAM(s) Nebulizer daily  aspirin  chewable 81 milliGRAM(s) Oral daily  atorvastatin 80 milliGRAM(s) Oral at bedtime  budesonide  80 MICROgram(s)/formoterol 4.5 MICROgram(s) Inhaler 2 Puff(s) Inhalation two times a day  chlorhexidine 2% Cloths 1 Application(s) Topical daily  dexMEDEtomidine Infusion 0.5 MICROgram(s)/kG/Hr IV Continuous <Continuous>  dextrose 50% Injectable 12.5 Gram(s) IV Push once  dextrose 50% Injectable 25 Gram(s) IV Push once  dextrose 50% Injectable 25 Gram(s) IV Push once  dextrose Oral Gel 15 Gram(s) Oral once PRN  furosemide   Injectable 80 milliGRAM(s) IV Push daily  glucagon  Injectable 1 milliGRAM(s) IntraMuscular once  heparin  Infusion 1600 Unit(s)/Hr IV Continuous <Continuous>  hydrALAZINE 100 milliGRAM(s) Oral every 8 hours  insulin glargine Injectable (LANTUS) 8 Unit(s) SubCutaneous at bedtime  insulin lispro (ADMELOG) corrective regimen sliding scale   SubCutaneous three times a day before meals  insulin lispro (ADMELOG) corrective regimen sliding scale   SubCutaneous at bedtime  isosorbide   dinitrate Tablet (ISORDIL) 30 milliGRAM(s) Oral three times a day  loperamide 2 milliGRAM(s) Oral once  nystatin    Suspension 297392 Unit(s) Oral every 6 hours  pantoprazole    Tablet 40 milliGRAM(s) Oral before breakfast  QUEtiapine 25 milliGRAM(s) Oral daily  sodium chloride 0.65% Nasal 1 Spray(s) Both Nostrils three times a day  spironolactone 25 milliGRAM(s) Oral daily  ticagrelor 90 milliGRAM(s) Oral every 12 hours  tiotropium 2.5 MICROgram(s) Inhaler 2 Puff(s) Inhalation daily      Vitals:  T(F): 98.2 (11-08), Max: 98.2 (11-08)  HR: 96 (11-08) (65 - 96)  BP: --  RR: 23 (11-08)  SpO2: 93% (11-08)  I&O's Summary    07 Nov 2023 07:01  -  08 Nov 2023 07:00  --------------------------------------------------------  IN: 1232.4 mL / OUT: 3705 mL / NET: -2472.6 mL    08 Nov 2023 07:01  -  08 Nov 2023 14:14  --------------------------------------------------------  IN: 708.2 mL / OUT: 1325 mL / NET: -616.8 mL        Physical Exam:  Appearance: No acute distress; well appearing, irritable   Eyes: PERRL, EOMI, pink conjunctiva  HEENT: Normal oral mucosa  Cardiovascular: RRR, S1, S2, no murmurs, rubs, or gallops; no edema; Hard to assess JVD as sitting up   Respiratory: Clear to auscultation bilaterally  Gastrointestinal: soft, non-tender, non-distended with normal bowel sounds  Musculoskeletal: No clubbing; no joint deformity   Neurologic: Non-focal  Lymphatic: No lymphadenopathy  Psychiatry: AAOx3, mood & affect appropriate  Skin: No rashes, ecchymoses, or cyanosis                          11.5   8.05  )-----------( 212      ( 08 Nov 2023 01:36 )             34.4     11-08    131<L>  |  102  |  26<H>  ----------------------------<  139<H>  4.3   |  17<L>  |  1.41<H>    Ca    9.2      08 Nov 2023 01:36  Phos  3.8     11-08  Mg     2.2     11-08    TPro  6.7  /  Alb  3.2<L>  /  TBili  0.8  /  DBili  x   /  AST  37  /  ALT  48<H>  /  AlkPhos  46  11-08    PT/INR - ( 08 Nov 2023 03:23 )   PT: 12.2 sec;   INR: 1.11 ratio         PTT - ( 08 Nov 2023 08:22 )  PTT:80.6 sec  CARDIAC MARKERS ( 03 Nov 2023 01:49 )  1226 ng/L / x     / x     / x     / x     / x

## 2023-11-09 DIAGNOSIS — I46.9 CARDIAC ARREST, CAUSE UNSPECIFIED: ICD-10-CM

## 2023-11-09 LAB
ALBUMIN SERPL ELPH-MCNC: 3.2 G/DL — LOW (ref 3.3–5)
ALBUMIN SERPL ELPH-MCNC: 3.2 G/DL — LOW (ref 3.3–5)
ALBUMIN SERPL ELPH-MCNC: 3.3 G/DL — SIGNIFICANT CHANGE UP (ref 3.3–5)
ALBUMIN SERPL ELPH-MCNC: 3.3 G/DL — SIGNIFICANT CHANGE UP (ref 3.3–5)
ALBUMIN SERPL ELPH-MCNC: 3.4 G/DL — SIGNIFICANT CHANGE UP (ref 3.3–5)
ALBUMIN SERPL ELPH-MCNC: 3.4 G/DL — SIGNIFICANT CHANGE UP (ref 3.3–5)
ALBUMIN SERPL ELPH-MCNC: 3.5 G/DL — SIGNIFICANT CHANGE UP (ref 3.3–5)
ALBUMIN SERPL ELPH-MCNC: 3.5 G/DL — SIGNIFICANT CHANGE UP (ref 3.3–5)
ALP SERPL-CCNC: 72 U/L — SIGNIFICANT CHANGE UP (ref 40–120)
ALP SERPL-CCNC: 72 U/L — SIGNIFICANT CHANGE UP (ref 40–120)
ALP SERPL-CCNC: 76 U/L — SIGNIFICANT CHANGE UP (ref 40–120)
ALP SERPL-CCNC: 76 U/L — SIGNIFICANT CHANGE UP (ref 40–120)
ALP SERPL-CCNC: 87 U/L — SIGNIFICANT CHANGE UP (ref 40–120)
ALP SERPL-CCNC: 87 U/L — SIGNIFICANT CHANGE UP (ref 40–120)
ALP SERPL-CCNC: 96 U/L — SIGNIFICANT CHANGE UP (ref 40–120)
ALP SERPL-CCNC: 96 U/L — SIGNIFICANT CHANGE UP (ref 40–120)
ALT FLD-CCNC: 145 U/L — HIGH (ref 10–45)
ALT FLD-CCNC: 145 U/L — HIGH (ref 10–45)
ALT FLD-CCNC: 146 U/L — HIGH (ref 10–45)
ALT FLD-CCNC: 146 U/L — HIGH (ref 10–45)
ALT FLD-CCNC: 173 U/L — HIGH (ref 10–45)
ALT FLD-CCNC: 173 U/L — HIGH (ref 10–45)
ALT FLD-CCNC: 191 U/L — HIGH (ref 10–45)
ALT FLD-CCNC: 191 U/L — HIGH (ref 10–45)
ANION GAP SERPL CALC-SCNC: 17 MMOL/L — SIGNIFICANT CHANGE UP (ref 5–17)
ANION GAP SERPL CALC-SCNC: 18 MMOL/L — HIGH (ref 5–17)
ANION GAP SERPL CALC-SCNC: 18 MMOL/L — HIGH (ref 5–17)
ANION GAP SERPL CALC-SCNC: 21 MMOL/L — HIGH (ref 5–17)
ANION GAP SERPL CALC-SCNC: 21 MMOL/L — HIGH (ref 5–17)
APTT BLD: 61.1 SEC — HIGH (ref 24.5–35.6)
APTT BLD: 61.1 SEC — HIGH (ref 24.5–35.6)
AST SERPL-CCNC: 108 U/L — HIGH (ref 10–40)
AST SERPL-CCNC: 108 U/L — HIGH (ref 10–40)
AST SERPL-CCNC: 120 U/L — HIGH (ref 10–40)
AST SERPL-CCNC: 120 U/L — HIGH (ref 10–40)
AST SERPL-CCNC: 186 U/L — HIGH (ref 10–40)
AST SERPL-CCNC: 186 U/L — HIGH (ref 10–40)
AST SERPL-CCNC: 208 U/L — HIGH (ref 10–40)
AST SERPL-CCNC: 208 U/L — HIGH (ref 10–40)
B-OH-BUTYR SERPL-SCNC: 0.5 MMOL/L — HIGH
B-OH-BUTYR SERPL-SCNC: 0.5 MMOL/L — HIGH
BASE EXCESS BLDMV CALC-SCNC: -4.2 MMOL/L — LOW (ref -3–3)
BASE EXCESS BLDMV CALC-SCNC: -4.2 MMOL/L — LOW (ref -3–3)
BASE EXCESS BLDMV CALC-SCNC: -4.4 MMOL/L — LOW (ref -3–3)
BASE EXCESS BLDMV CALC-SCNC: -4.4 MMOL/L — LOW (ref -3–3)
BASE EXCESS BLDMV CALC-SCNC: -6.4 MMOL/L — LOW (ref -3–3)
BASE EXCESS BLDMV CALC-SCNC: -6.4 MMOL/L — LOW (ref -3–3)
BASE EXCESS BLDV CALC-SCNC: -7.4 MMOL/L — LOW (ref -2–3)
BASE EXCESS BLDV CALC-SCNC: -7.4 MMOL/L — LOW (ref -2–3)
BASOPHILS # BLD AUTO: 0.02 K/UL — SIGNIFICANT CHANGE UP (ref 0–0.2)
BASOPHILS # BLD AUTO: 0.02 K/UL — SIGNIFICANT CHANGE UP (ref 0–0.2)
BASOPHILS # BLD AUTO: 0.16 K/UL — SIGNIFICANT CHANGE UP (ref 0–0.2)
BASOPHILS # BLD AUTO: 0.16 K/UL — SIGNIFICANT CHANGE UP (ref 0–0.2)
BASOPHILS NFR BLD AUTO: 0.2 % — SIGNIFICANT CHANGE UP (ref 0–2)
BASOPHILS NFR BLD AUTO: 0.2 % — SIGNIFICANT CHANGE UP (ref 0–2)
BASOPHILS NFR BLD AUTO: 0.9 % — SIGNIFICANT CHANGE UP (ref 0–2)
BASOPHILS NFR BLD AUTO: 0.9 % — SIGNIFICANT CHANGE UP (ref 0–2)
BILIRUB SERPL-MCNC: 0.6 MG/DL — SIGNIFICANT CHANGE UP (ref 0.2–1.2)
BILIRUB SERPL-MCNC: 0.7 MG/DL — SIGNIFICANT CHANGE UP (ref 0.2–1.2)
BILIRUB SERPL-MCNC: 0.7 MG/DL — SIGNIFICANT CHANGE UP (ref 0.2–1.2)
BILIRUB SERPL-MCNC: 1 MG/DL — SIGNIFICANT CHANGE UP (ref 0.2–1.2)
BILIRUB SERPL-MCNC: 1 MG/DL — SIGNIFICANT CHANGE UP (ref 0.2–1.2)
BUN SERPL-MCNC: 44 MG/DL — HIGH (ref 7–23)
BUN SERPL-MCNC: 44 MG/DL — HIGH (ref 7–23)
BUN SERPL-MCNC: 47 MG/DL — HIGH (ref 7–23)
BUN SERPL-MCNC: 47 MG/DL — HIGH (ref 7–23)
BUN SERPL-MCNC: 49 MG/DL — HIGH (ref 7–23)
BUN SERPL-MCNC: 49 MG/DL — HIGH (ref 7–23)
BUN SERPL-MCNC: 50 MG/DL — HIGH (ref 7–23)
BUN SERPL-MCNC: 50 MG/DL — HIGH (ref 7–23)
CA-I SERPL-SCNC: 1.16 MMOL/L — SIGNIFICANT CHANGE UP (ref 1.15–1.33)
CA-I SERPL-SCNC: 1.16 MMOL/L — SIGNIFICANT CHANGE UP (ref 1.15–1.33)
CALCIUM SERPL-MCNC: 8.9 MG/DL — SIGNIFICANT CHANGE UP (ref 8.4–10.5)
CALCIUM SERPL-MCNC: 9 MG/DL — SIGNIFICANT CHANGE UP (ref 8.4–10.5)
CALCIUM SERPL-MCNC: 9 MG/DL — SIGNIFICANT CHANGE UP (ref 8.4–10.5)
CALCIUM SERPL-MCNC: 9.2 MG/DL — SIGNIFICANT CHANGE UP (ref 8.4–10.5)
CALCIUM SERPL-MCNC: 9.2 MG/DL — SIGNIFICANT CHANGE UP (ref 8.4–10.5)
CHLORIDE BLDV-SCNC: 99 MMOL/L — SIGNIFICANT CHANGE UP (ref 96–108)
CHLORIDE BLDV-SCNC: 99 MMOL/L — SIGNIFICANT CHANGE UP (ref 96–108)
CHLORIDE SERPL-SCNC: 101 MMOL/L — SIGNIFICANT CHANGE UP (ref 96–108)
CHLORIDE SERPL-SCNC: 95 MMOL/L — LOW (ref 96–108)
CHLORIDE SERPL-SCNC: 95 MMOL/L — LOW (ref 96–108)
CHLORIDE SERPL-SCNC: 98 MMOL/L — SIGNIFICANT CHANGE UP (ref 96–108)
CHLORIDE SERPL-SCNC: 98 MMOL/L — SIGNIFICANT CHANGE UP (ref 96–108)
CO2 BLDMV-SCNC: 20 MMOL/L — LOW (ref 21–29)
CO2 BLDMV-SCNC: 20 MMOL/L — LOW (ref 21–29)
CO2 BLDMV-SCNC: 22 MMOL/L — SIGNIFICANT CHANGE UP (ref 21–29)
CO2 BLDV-SCNC: 19 MMOL/L — LOW (ref 22–26)
CO2 BLDV-SCNC: 19 MMOL/L — LOW (ref 22–26)
CO2 SERPL-SCNC: 15 MMOL/L — LOW (ref 22–31)
CO2 SERPL-SCNC: 16 MMOL/L — LOW (ref 22–31)
CREAT SERPL-MCNC: 2.37 MG/DL — HIGH (ref 0.5–1.3)
CREAT SERPL-MCNC: 2.37 MG/DL — HIGH (ref 0.5–1.3)
CREAT SERPL-MCNC: 2.75 MG/DL — HIGH (ref 0.5–1.3)
CREAT SERPL-MCNC: 2.75 MG/DL — HIGH (ref 0.5–1.3)
CREAT SERPL-MCNC: 3.4 MG/DL — HIGH (ref 0.5–1.3)
CREAT SERPL-MCNC: 3.4 MG/DL — HIGH (ref 0.5–1.3)
CREAT SERPL-MCNC: 3.64 MG/DL — HIGH (ref 0.5–1.3)
CREAT SERPL-MCNC: 3.64 MG/DL — HIGH (ref 0.5–1.3)
CULTURE RESULTS: SIGNIFICANT CHANGE UP
EGFR: 18 ML/MIN/1.73M2 — LOW
EGFR: 18 ML/MIN/1.73M2 — LOW
EGFR: 20 ML/MIN/1.73M2 — LOW
EGFR: 20 ML/MIN/1.73M2 — LOW
EGFR: 26 ML/MIN/1.73M2 — LOW
EGFR: 26 ML/MIN/1.73M2 — LOW
EGFR: 31 ML/MIN/1.73M2 — LOW
EGFR: 31 ML/MIN/1.73M2 — LOW
EOSINOPHIL # BLD AUTO: 0.09 K/UL — SIGNIFICANT CHANGE UP (ref 0–0.5)
EOSINOPHIL # BLD AUTO: 0.09 K/UL — SIGNIFICANT CHANGE UP (ref 0–0.5)
EOSINOPHIL # BLD AUTO: 0.3 K/UL — SIGNIFICANT CHANGE UP (ref 0–0.5)
EOSINOPHIL # BLD AUTO: 0.3 K/UL — SIGNIFICANT CHANGE UP (ref 0–0.5)
EOSINOPHIL NFR BLD AUTO: 0.8 % — SIGNIFICANT CHANGE UP (ref 0–6)
EOSINOPHIL NFR BLD AUTO: 0.8 % — SIGNIFICANT CHANGE UP (ref 0–6)
EOSINOPHIL NFR BLD AUTO: 1.7 % — SIGNIFICANT CHANGE UP (ref 0–6)
EOSINOPHIL NFR BLD AUTO: 1.7 % — SIGNIFICANT CHANGE UP (ref 0–6)
GAS PNL BLDA: SIGNIFICANT CHANGE UP
GAS PNL BLDMV: SIGNIFICANT CHANGE UP
GAS PNL BLDV: 128 MMOL/L — LOW (ref 136–145)
GAS PNL BLDV: 128 MMOL/L — LOW (ref 136–145)
GAS PNL BLDV: SIGNIFICANT CHANGE UP
GLUCOSE BLDC GLUCOMTR-MCNC: 122 MG/DL — HIGH (ref 70–99)
GLUCOSE BLDC GLUCOMTR-MCNC: 122 MG/DL — HIGH (ref 70–99)
GLUCOSE BLDC GLUCOMTR-MCNC: 123 MG/DL — HIGH (ref 70–99)
GLUCOSE BLDC GLUCOMTR-MCNC: 123 MG/DL — HIGH (ref 70–99)
GLUCOSE BLDC GLUCOMTR-MCNC: 124 MG/DL — HIGH (ref 70–99)
GLUCOSE BLDC GLUCOMTR-MCNC: 124 MG/DL — HIGH (ref 70–99)
GLUCOSE BLDC GLUCOMTR-MCNC: 125 MG/DL — HIGH (ref 70–99)
GLUCOSE BLDC GLUCOMTR-MCNC: 125 MG/DL — HIGH (ref 70–99)
GLUCOSE BLDC GLUCOMTR-MCNC: 127 MG/DL — HIGH (ref 70–99)
GLUCOSE BLDC GLUCOMTR-MCNC: 127 MG/DL — HIGH (ref 70–99)
GLUCOSE BLDC GLUCOMTR-MCNC: 128 MG/DL — HIGH (ref 70–99)
GLUCOSE BLDC GLUCOMTR-MCNC: 130 MG/DL — HIGH (ref 70–99)
GLUCOSE BLDC GLUCOMTR-MCNC: 130 MG/DL — HIGH (ref 70–99)
GLUCOSE BLDC GLUCOMTR-MCNC: 131 MG/DL — HIGH (ref 70–99)
GLUCOSE BLDC GLUCOMTR-MCNC: 131 MG/DL — HIGH (ref 70–99)
GLUCOSE BLDC GLUCOMTR-MCNC: 190 MG/DL — HIGH (ref 70–99)
GLUCOSE BLDC GLUCOMTR-MCNC: 190 MG/DL — HIGH (ref 70–99)
GLUCOSE BLDC GLUCOMTR-MCNC: 202 MG/DL — HIGH (ref 70–99)
GLUCOSE BLDC GLUCOMTR-MCNC: 202 MG/DL — HIGH (ref 70–99)
GLUCOSE BLDC GLUCOMTR-MCNC: 244 MG/DL — HIGH (ref 70–99)
GLUCOSE BLDC GLUCOMTR-MCNC: 244 MG/DL — HIGH (ref 70–99)
GLUCOSE BLDC GLUCOMTR-MCNC: 274 MG/DL — HIGH (ref 70–99)
GLUCOSE BLDC GLUCOMTR-MCNC: 274 MG/DL — HIGH (ref 70–99)
GLUCOSE BLDC GLUCOMTR-MCNC: 277 MG/DL — HIGH (ref 70–99)
GLUCOSE BLDC GLUCOMTR-MCNC: 299 MG/DL — HIGH (ref 70–99)
GLUCOSE BLDC GLUCOMTR-MCNC: 299 MG/DL — HIGH (ref 70–99)
GLUCOSE BLDC GLUCOMTR-MCNC: 312 MG/DL — HIGH (ref 70–99)
GLUCOSE BLDC GLUCOMTR-MCNC: 312 MG/DL — HIGH (ref 70–99)
GLUCOSE BLDC GLUCOMTR-MCNC: 330 MG/DL — HIGH (ref 70–99)
GLUCOSE BLDC GLUCOMTR-MCNC: 330 MG/DL — HIGH (ref 70–99)
GLUCOSE BLDC GLUCOMTR-MCNC: 349 MG/DL — HIGH (ref 70–99)
GLUCOSE BLDC GLUCOMTR-MCNC: 349 MG/DL — HIGH (ref 70–99)
GLUCOSE BLDV-MCNC: 377 MG/DL — HIGH (ref 70–99)
GLUCOSE BLDV-MCNC: 377 MG/DL — HIGH (ref 70–99)
GLUCOSE SERPL-MCNC: 107 MG/DL — HIGH (ref 70–99)
GLUCOSE SERPL-MCNC: 107 MG/DL — HIGH (ref 70–99)
GLUCOSE SERPL-MCNC: 121 MG/DL — HIGH (ref 70–99)
GLUCOSE SERPL-MCNC: 121 MG/DL — HIGH (ref 70–99)
GLUCOSE SERPL-MCNC: 296 MG/DL — HIGH (ref 70–99)
GLUCOSE SERPL-MCNC: 296 MG/DL — HIGH (ref 70–99)
GLUCOSE SERPL-MCNC: 366 MG/DL — HIGH (ref 70–99)
GLUCOSE SERPL-MCNC: 366 MG/DL — HIGH (ref 70–99)
HCO3 BLDMV-SCNC: 19 MMOL/L — LOW (ref 20–28)
HCO3 BLDMV-SCNC: 19 MMOL/L — LOW (ref 20–28)
HCO3 BLDMV-SCNC: 21 MMOL/L — SIGNIFICANT CHANGE UP (ref 20–28)
HCO3 BLDV-SCNC: 18 MMOL/L — LOW (ref 22–29)
HCO3 BLDV-SCNC: 18 MMOL/L — LOW (ref 22–29)
HCT VFR BLD CALC: 35.1 % — LOW (ref 39–50)
HCT VFR BLD CALC: 35.1 % — LOW (ref 39–50)
HCT VFR BLD CALC: 38.1 % — LOW (ref 39–50)
HCT VFR BLD CALC: 38.1 % — LOW (ref 39–50)
HCT VFR BLDA CALC: 39 % — SIGNIFICANT CHANGE UP (ref 39–51)
HCT VFR BLDA CALC: 39 % — SIGNIFICANT CHANGE UP (ref 39–51)
HGB BLD CALC-MCNC: 13 G/DL — SIGNIFICANT CHANGE UP (ref 12.6–17.4)
HGB BLD CALC-MCNC: 13 G/DL — SIGNIFICANT CHANGE UP (ref 12.6–17.4)
HGB BLD-MCNC: 11.9 G/DL — LOW (ref 13–17)
HGB BLD-MCNC: 11.9 G/DL — LOW (ref 13–17)
HGB BLD-MCNC: 12.8 G/DL — LOW (ref 13–17)
HGB BLD-MCNC: 12.8 G/DL — LOW (ref 13–17)
HOROWITZ INDEX BLDMV+IHG-RTO: 100 — SIGNIFICANT CHANGE UP
HOROWITZ INDEX BLDMV+IHG-RTO: 100 — SIGNIFICANT CHANGE UP
HOROWITZ INDEX BLDMV+IHG-RTO: 36 — SIGNIFICANT CHANGE UP
HOROWITZ INDEX BLDMV+IHG-RTO: 36 — SIGNIFICANT CHANGE UP
HOROWITZ INDEX BLDV+IHG-RTO: 100 — SIGNIFICANT CHANGE UP
HOROWITZ INDEX BLDV+IHG-RTO: 100 — SIGNIFICANT CHANGE UP
IMM GRANULOCYTES NFR BLD AUTO: 1 % — HIGH (ref 0–0.9)
IMM GRANULOCYTES NFR BLD AUTO: 1 % — HIGH (ref 0–0.9)
INR BLD: 1.23 RATIO — HIGH (ref 0.85–1.18)
INR BLD: 1.23 RATIO — HIGH (ref 0.85–1.18)
LACTATE BLDV-MCNC: 1.8 MMOL/L — SIGNIFICANT CHANGE UP (ref 0.5–2)
LACTATE BLDV-MCNC: 1.8 MMOL/L — SIGNIFICANT CHANGE UP (ref 0.5–2)
LYMPHOCYTES # BLD AUTO: 1.09 K/UL — SIGNIFICANT CHANGE UP (ref 1–3.3)
LYMPHOCYTES # BLD AUTO: 1.09 K/UL — SIGNIFICANT CHANGE UP (ref 1–3.3)
LYMPHOCYTES # BLD AUTO: 1.31 K/UL — SIGNIFICANT CHANGE UP (ref 1–3.3)
LYMPHOCYTES # BLD AUTO: 1.31 K/UL — SIGNIFICANT CHANGE UP (ref 1–3.3)
LYMPHOCYTES # BLD AUTO: 11.3 % — LOW (ref 13–44)
LYMPHOCYTES # BLD AUTO: 11.3 % — LOW (ref 13–44)
LYMPHOCYTES # BLD AUTO: 6.1 % — LOW (ref 13–44)
LYMPHOCYTES # BLD AUTO: 6.1 % — LOW (ref 13–44)
MAGNESIUM SERPL-MCNC: 3.2 MG/DL — HIGH (ref 1.6–2.6)
MAGNESIUM SERPL-MCNC: 3.2 MG/DL — HIGH (ref 1.6–2.6)
MAGNESIUM SERPL-MCNC: 3.3 MG/DL — HIGH (ref 1.6–2.6)
MAGNESIUM SERPL-MCNC: 3.3 MG/DL — HIGH (ref 1.6–2.6)
MAGNESIUM SERPL-MCNC: 3.4 MG/DL — HIGH (ref 1.6–2.6)
MAGNESIUM SERPL-MCNC: 3.4 MG/DL — HIGH (ref 1.6–2.6)
MANUAL SMEAR VERIFICATION: SIGNIFICANT CHANGE UP
MANUAL SMEAR VERIFICATION: SIGNIFICANT CHANGE UP
MCHC RBC-ENTMCNC: 28.6 PG — SIGNIFICANT CHANGE UP (ref 27–34)
MCHC RBC-ENTMCNC: 28.6 PG — SIGNIFICANT CHANGE UP (ref 27–34)
MCHC RBC-ENTMCNC: 29 PG — SIGNIFICANT CHANGE UP (ref 27–34)
MCHC RBC-ENTMCNC: 29 PG — SIGNIFICANT CHANGE UP (ref 27–34)
MCHC RBC-ENTMCNC: 33.6 GM/DL — SIGNIFICANT CHANGE UP (ref 32–36)
MCHC RBC-ENTMCNC: 33.6 GM/DL — SIGNIFICANT CHANGE UP (ref 32–36)
MCHC RBC-ENTMCNC: 33.9 GM/DL — SIGNIFICANT CHANGE UP (ref 32–36)
MCHC RBC-ENTMCNC: 33.9 GM/DL — SIGNIFICANT CHANGE UP (ref 32–36)
MCV RBC AUTO: 85.2 FL — SIGNIFICANT CHANGE UP (ref 80–100)
MCV RBC AUTO: 85.2 FL — SIGNIFICANT CHANGE UP (ref 80–100)
MCV RBC AUTO: 85.6 FL — SIGNIFICANT CHANGE UP (ref 80–100)
MCV RBC AUTO: 85.6 FL — SIGNIFICANT CHANGE UP (ref 80–100)
METAMYELOCYTES # FLD: 1.7 % — HIGH (ref 0–0)
METAMYELOCYTES # FLD: 1.7 % — HIGH (ref 0–0)
MONOCYTES # BLD AUTO: 0.93 K/UL — HIGH (ref 0–0.9)
MONOCYTES # BLD AUTO: 0.93 K/UL — HIGH (ref 0–0.9)
MONOCYTES # BLD AUTO: 1.26 K/UL — HIGH (ref 0–0.9)
MONOCYTES # BLD AUTO: 1.26 K/UL — HIGH (ref 0–0.9)
MONOCYTES NFR BLD AUTO: 10.9 % — SIGNIFICANT CHANGE UP (ref 2–14)
MONOCYTES NFR BLD AUTO: 10.9 % — SIGNIFICANT CHANGE UP (ref 2–14)
MONOCYTES NFR BLD AUTO: 5.2 % — SIGNIFICANT CHANGE UP (ref 2–14)
MONOCYTES NFR BLD AUTO: 5.2 % — SIGNIFICANT CHANGE UP (ref 2–14)
NEUTROPHILS # BLD AUTO: 14.95 K/UL — HIGH (ref 1.8–7.4)
NEUTROPHILS # BLD AUTO: 14.95 K/UL — HIGH (ref 1.8–7.4)
NEUTROPHILS # BLD AUTO: 8.81 K/UL — HIGH (ref 1.8–7.4)
NEUTROPHILS # BLD AUTO: 8.81 K/UL — HIGH (ref 1.8–7.4)
NEUTROPHILS NFR BLD AUTO: 75.8 % — SIGNIFICANT CHANGE UP (ref 43–77)
NEUTROPHILS NFR BLD AUTO: 75.8 % — SIGNIFICANT CHANGE UP (ref 43–77)
NEUTROPHILS NFR BLD AUTO: 83.5 % — HIGH (ref 43–77)
NEUTROPHILS NFR BLD AUTO: 83.5 % — HIGH (ref 43–77)
NRBC # BLD: 0 /100 WBCS — SIGNIFICANT CHANGE UP (ref 0–0)
NRBC # BLD: 0 /100 WBCS — SIGNIFICANT CHANGE UP (ref 0–0)
O2 CT VFR BLD CALC: 41 MMHG — SIGNIFICANT CHANGE UP (ref 30–65)
O2 CT VFR BLD CALC: 41 MMHG — SIGNIFICANT CHANGE UP (ref 30–65)
O2 CT VFR BLD CALC: 45 MMHG — SIGNIFICANT CHANGE UP (ref 30–65)
O2 CT VFR BLD CALC: 45 MMHG — SIGNIFICANT CHANGE UP (ref 30–65)
O2 CT VFR BLD CALC: 58 MMHG — SIGNIFICANT CHANGE UP (ref 30–65)
O2 CT VFR BLD CALC: 58 MMHG — SIGNIFICANT CHANGE UP (ref 30–65)
PCO2 BLDMV: 37 MMHG — SIGNIFICANT CHANGE UP (ref 30–65)
PCO2 BLDMV: 37 MMHG — SIGNIFICANT CHANGE UP (ref 30–65)
PCO2 BLDMV: 38 MMHG — SIGNIFICANT CHANGE UP (ref 30–65)
PCO2 BLDMV: 38 MMHG — SIGNIFICANT CHANGE UP (ref 30–65)
PCO2 BLDMV: 39 MMHG — SIGNIFICANT CHANGE UP (ref 30–65)
PCO2 BLDMV: 39 MMHG — SIGNIFICANT CHANGE UP (ref 30–65)
PCO2 BLDV: 36 MMHG — LOW (ref 42–55)
PCO2 BLDV: 36 MMHG — LOW (ref 42–55)
PH BLDMV: 7.32 — SIGNIFICANT CHANGE UP (ref 7.32–7.45)
PH BLDMV: 7.32 — SIGNIFICANT CHANGE UP (ref 7.32–7.45)
PH BLDMV: 7.34 — SIGNIFICANT CHANGE UP (ref 7.32–7.45)
PH BLDMV: 7.34 — SIGNIFICANT CHANGE UP (ref 7.32–7.45)
PH BLDMV: 7.35 — SIGNIFICANT CHANGE UP (ref 7.32–7.45)
PH BLDMV: 7.35 — SIGNIFICANT CHANGE UP (ref 7.32–7.45)
PH BLDV: 7.31 — LOW (ref 7.32–7.43)
PH BLDV: 7.31 — LOW (ref 7.32–7.43)
PHOSPHATE SERPL-MCNC: 4.1 MG/DL — SIGNIFICANT CHANGE UP (ref 2.5–4.5)
PHOSPHATE SERPL-MCNC: 4.1 MG/DL — SIGNIFICANT CHANGE UP (ref 2.5–4.5)
PHOSPHATE SERPL-MCNC: 5.1 MG/DL — HIGH (ref 2.5–4.5)
PHOSPHATE SERPL-MCNC: 5.1 MG/DL — HIGH (ref 2.5–4.5)
PHOSPHATE SERPL-MCNC: 5.6 MG/DL — HIGH (ref 2.5–4.5)
PHOSPHATE SERPL-MCNC: 5.6 MG/DL — HIGH (ref 2.5–4.5)
PLAT MORPH BLD: NORMAL — SIGNIFICANT CHANGE UP
PLAT MORPH BLD: NORMAL — SIGNIFICANT CHANGE UP
PLATELET # BLD AUTO: 285 K/UL — SIGNIFICANT CHANGE UP (ref 150–400)
PLATELET # BLD AUTO: 285 K/UL — SIGNIFICANT CHANGE UP (ref 150–400)
PLATELET # BLD AUTO: 319 K/UL — SIGNIFICANT CHANGE UP (ref 150–400)
PLATELET # BLD AUTO: 319 K/UL — SIGNIFICANT CHANGE UP (ref 150–400)
PO2 BLDV: 65 MMHG — HIGH (ref 25–45)
PO2 BLDV: 65 MMHG — HIGH (ref 25–45)
POTASSIUM BLDV-SCNC: 3.8 MMOL/L — SIGNIFICANT CHANGE UP (ref 3.5–5.1)
POTASSIUM BLDV-SCNC: 3.8 MMOL/L — SIGNIFICANT CHANGE UP (ref 3.5–5.1)
POTASSIUM SERPL-MCNC: 3.8 MMOL/L — SIGNIFICANT CHANGE UP (ref 3.5–5.3)
POTASSIUM SERPL-MCNC: 4.1 MMOL/L — SIGNIFICANT CHANGE UP (ref 3.5–5.3)
POTASSIUM SERPL-MCNC: 4.1 MMOL/L — SIGNIFICANT CHANGE UP (ref 3.5–5.3)
POTASSIUM SERPL-SCNC: 3.8 MMOL/L — SIGNIFICANT CHANGE UP (ref 3.5–5.3)
POTASSIUM SERPL-SCNC: 4.1 MMOL/L — SIGNIFICANT CHANGE UP (ref 3.5–5.3)
POTASSIUM SERPL-SCNC: 4.1 MMOL/L — SIGNIFICANT CHANGE UP (ref 3.5–5.3)
PROT SERPL-MCNC: 6.9 G/DL — SIGNIFICANT CHANGE UP (ref 6–8.3)
PROT SERPL-MCNC: 7.1 G/DL — SIGNIFICANT CHANGE UP (ref 6–8.3)
PROT SERPL-MCNC: 7.1 G/DL — SIGNIFICANT CHANGE UP (ref 6–8.3)
PROT SERPL-MCNC: 7.3 G/DL — SIGNIFICANT CHANGE UP (ref 6–8.3)
PROT SERPL-MCNC: 7.3 G/DL — SIGNIFICANT CHANGE UP (ref 6–8.3)
PROTHROM AB SERPL-ACNC: 12.8 SEC — SIGNIFICANT CHANGE UP (ref 9.5–13)
PROTHROM AB SERPL-ACNC: 12.8 SEC — SIGNIFICANT CHANGE UP (ref 9.5–13)
RBC # BLD: 4.1 M/UL — LOW (ref 4.2–5.8)
RBC # BLD: 4.1 M/UL — LOW (ref 4.2–5.8)
RBC # BLD: 4.47 M/UL — SIGNIFICANT CHANGE UP (ref 4.2–5.8)
RBC # BLD: 4.47 M/UL — SIGNIFICANT CHANGE UP (ref 4.2–5.8)
RBC # FLD: 15.1 % — HIGH (ref 10.3–14.5)
RBC # FLD: 15.1 % — HIGH (ref 10.3–14.5)
RBC # FLD: 15.3 % — HIGH (ref 10.3–14.5)
RBC # FLD: 15.3 % — HIGH (ref 10.3–14.5)
RBC BLD AUTO: SIGNIFICANT CHANGE UP
RBC BLD AUTO: SIGNIFICANT CHANGE UP
SAO2 % BLDMV: 62.1 — SIGNIFICANT CHANGE UP (ref 60–90)
SAO2 % BLDMV: 62.1 — SIGNIFICANT CHANGE UP (ref 60–90)
SAO2 % BLDMV: 69.6 — SIGNIFICANT CHANGE UP (ref 60–90)
SAO2 % BLDMV: 69.6 — SIGNIFICANT CHANGE UP (ref 60–90)
SAO2 % BLDMV: 85.1 — SIGNIFICANT CHANGE UP (ref 60–90)
SAO2 % BLDMV: 85.1 — SIGNIFICANT CHANGE UP (ref 60–90)
SAO2 % BLDV: 90.7 % — HIGH (ref 67–88)
SAO2 % BLDV: 90.7 % — HIGH (ref 67–88)
SODIUM SERPL-SCNC: 131 MMOL/L — LOW (ref 135–145)
SODIUM SERPL-SCNC: 134 MMOL/L — LOW (ref 135–145)
SPECIMEN SOURCE: SIGNIFICANT CHANGE UP
VARIANT LYMPHS # BLD: 0.9 % — SIGNIFICANT CHANGE UP (ref 0–6)
VARIANT LYMPHS # BLD: 0.9 % — SIGNIFICANT CHANGE UP (ref 0–6)
WBC # BLD: 11.61 K/UL — HIGH (ref 3.8–10.5)
WBC # BLD: 11.61 K/UL — HIGH (ref 3.8–10.5)
WBC # BLD: 17.91 K/UL — HIGH (ref 3.8–10.5)
WBC # BLD: 17.91 K/UL — HIGH (ref 3.8–10.5)
WBC # FLD AUTO: 11.61 K/UL — HIGH (ref 3.8–10.5)
WBC # FLD AUTO: 11.61 K/UL — HIGH (ref 3.8–10.5)
WBC # FLD AUTO: 17.91 K/UL — HIGH (ref 3.8–10.5)
WBC # FLD AUTO: 17.91 K/UL — HIGH (ref 3.8–10.5)

## 2023-11-09 PROCEDURE — 99292 CRITICAL CARE ADDL 30 MIN: CPT | Mod: 25

## 2023-11-09 PROCEDURE — 36620 INSERTION CATHETER ARTERY: CPT

## 2023-11-09 PROCEDURE — 93010 ELECTROCARDIOGRAM REPORT: CPT

## 2023-11-09 PROCEDURE — 33967 INSERT I-AORT PERCUT DEVICE: CPT

## 2023-11-09 PROCEDURE — 99233 SBSQ HOSP IP/OBS HIGH 50: CPT | Mod: 25

## 2023-11-09 PROCEDURE — 93503 INSERT/PLACE HEART CATHETER: CPT

## 2023-11-09 PROCEDURE — 71045 X-RAY EXAM CHEST 1 VIEW: CPT | Mod: 26,77,76

## 2023-11-09 PROCEDURE — 99233 SBSQ HOSP IP/OBS HIGH 50: CPT

## 2023-11-09 PROCEDURE — 71045 X-RAY EXAM CHEST 1 VIEW: CPT | Mod: 26

## 2023-11-09 PROCEDURE — 99291 CRITICAL CARE FIRST HOUR: CPT | Mod: 25

## 2023-11-09 PROCEDURE — 36556 INSERT NON-TUNNEL CV CATH: CPT | Mod: 59

## 2023-11-09 RX ORDER — INSULIN GLARGINE 100 [IU]/ML
5 INJECTION, SOLUTION SUBCUTANEOUS AT BEDTIME
Refills: 0 | Status: DISCONTINUED | OUTPATIENT
Start: 2023-11-09 | End: 2023-11-09

## 2023-11-09 RX ORDER — PANTOPRAZOLE SODIUM 20 MG/1
40 TABLET, DELAYED RELEASE ORAL DAILY
Refills: 0 | Status: DISCONTINUED | OUTPATIENT
Start: 2023-11-09 | End: 2023-11-14

## 2023-11-09 RX ORDER — INSULIN HUMAN 100 [IU]/ML
3 INJECTION, SOLUTION SUBCUTANEOUS ONCE
Refills: 0 | Status: COMPLETED | OUTPATIENT
Start: 2023-11-09 | End: 2023-11-09

## 2023-11-09 RX ORDER — INSULIN LISPRO 100/ML
VIAL (ML) SUBCUTANEOUS EVERY 6 HOURS
Refills: 0 | Status: DISCONTINUED | OUTPATIENT
Start: 2023-11-09 | End: 2023-11-09

## 2023-11-09 RX ORDER — IPRATROPIUM/ALBUTEROL SULFATE 18-103MCG
3 AEROSOL WITH ADAPTER (GRAM) INHALATION EVERY 6 HOURS
Refills: 0 | Status: DISCONTINUED | OUTPATIENT
Start: 2023-11-09 | End: 2023-11-14

## 2023-11-09 RX ORDER — DEXMEDETOMIDINE HYDROCHLORIDE IN 0.9% SODIUM CHLORIDE 4 UG/ML
0.5 INJECTION INTRAVENOUS
Qty: 200 | Refills: 0 | Status: DISCONTINUED | OUTPATIENT
Start: 2023-11-09 | End: 2023-11-10

## 2023-11-09 RX ORDER — INSULIN HUMAN 100 [IU]/ML
3 INJECTION, SOLUTION SUBCUTANEOUS
Qty: 100 | Refills: 0 | Status: DISCONTINUED | OUTPATIENT
Start: 2023-11-09 | End: 2023-11-11

## 2023-11-09 RX ORDER — DEXTROSE 50 % IN WATER 50 %
12.5 SYRINGE (ML) INTRAVENOUS ONCE
Refills: 0 | Status: DISCONTINUED | OUTPATIENT
Start: 2023-11-09 | End: 2023-11-29

## 2023-11-09 RX ORDER — DEXTROSE 50 % IN WATER 50 %
25 SYRINGE (ML) INTRAVENOUS ONCE
Refills: 0 | Status: DISCONTINUED | OUTPATIENT
Start: 2023-11-09 | End: 2023-11-29

## 2023-11-09 RX ORDER — INSULIN LISPRO 100/ML
8 VIAL (ML) SUBCUTANEOUS ONCE
Refills: 0 | Status: COMPLETED | OUTPATIENT
Start: 2023-11-09 | End: 2023-11-09

## 2023-11-09 RX ORDER — POTASSIUM CHLORIDE 20 MEQ
10 PACKET (EA) ORAL ONCE
Refills: 0 | Status: COMPLETED | OUTPATIENT
Start: 2023-11-09 | End: 2023-11-09

## 2023-11-09 RX ORDER — ACETAMINOPHEN 500 MG
1000 TABLET ORAL ONCE
Refills: 0 | Status: COMPLETED | OUTPATIENT
Start: 2023-11-09 | End: 2023-11-09

## 2023-11-09 RX ORDER — SODIUM CHLORIDE 9 MG/ML
1000 INJECTION, SOLUTION INTRAVENOUS
Refills: 0 | Status: DISCONTINUED | OUTPATIENT
Start: 2023-11-09 | End: 2023-11-11

## 2023-11-09 RX ORDER — AMIODARONE HYDROCHLORIDE 400 MG/1
0.5 TABLET ORAL
Qty: 450 | Refills: 0 | Status: DISCONTINUED | OUTPATIENT
Start: 2023-11-09 | End: 2023-11-11

## 2023-11-09 RX ADMIN — BUDESONIDE AND FORMOTEROL FUMARATE DIHYDRATE 2 PUFF(S): 160; 4.5 AEROSOL RESPIRATORY (INHALATION) at 09:00

## 2023-11-09 RX ADMIN — DEXMEDETOMIDINE HYDROCHLORIDE IN 0.9% SODIUM CHLORIDE 12.4 MICROGRAM(S)/KG/HR: 4 INJECTION INTRAVENOUS at 21:40

## 2023-11-09 RX ADMIN — ISOSORBIDE DINITRATE 30 MILLIGRAM(S): 5 TABLET ORAL at 11:23

## 2023-11-09 RX ADMIN — BUDESONIDE AND FORMOTEROL FUMARATE DIHYDRATE 2 PUFF(S): 160; 4.5 AEROSOL RESPIRATORY (INHALATION) at 21:07

## 2023-11-09 RX ADMIN — SPIRONOLACTONE 25 MILLIGRAM(S): 25 TABLET, FILM COATED ORAL at 05:39

## 2023-11-09 RX ADMIN — TIOTROPIUM BROMIDE 2 PUFF(S): 18 CAPSULE ORAL; RESPIRATORY (INHALATION) at 09:00

## 2023-11-09 RX ADMIN — AMIODARONE HYDROCHLORIDE 16.7 MG/MIN: 400 TABLET ORAL at 06:29

## 2023-11-09 RX ADMIN — INSULIN HUMAN 3 UNIT(S): 100 INJECTION, SOLUTION SUBCUTANEOUS at 03:48

## 2023-11-09 RX ADMIN — Medication 1000 MILLIGRAM(S): at 22:00

## 2023-11-09 RX ADMIN — TICAGRELOR 90 MILLIGRAM(S): 90 TABLET ORAL at 05:39

## 2023-11-09 RX ADMIN — HEPARIN SODIUM 17 UNIT(S)/HR: 5000 INJECTION INTRAVENOUS; SUBCUTANEOUS at 19:52

## 2023-11-09 RX ADMIN — Medication 15 MG/MIN: at 03:55

## 2023-11-09 RX ADMIN — TICAGRELOR 90 MILLIGRAM(S): 90 TABLET ORAL at 18:33

## 2023-11-09 RX ADMIN — PANTOPRAZOLE SODIUM 40 MILLIGRAM(S): 20 TABLET, DELAYED RELEASE ORAL at 11:24

## 2023-11-09 RX ADMIN — CHLORHEXIDINE GLUCONATE 1 APPLICATION(S): 213 SOLUTION TOPICAL at 23:43

## 2023-11-09 RX ADMIN — Medication 50 MILLIEQUIVALENT(S): at 18:33

## 2023-11-09 RX ADMIN — Medication 81 MILLIGRAM(S): at 12:07

## 2023-11-09 RX ADMIN — Medication 15 MG/MIN: at 19:51

## 2023-11-09 RX ADMIN — INSULIN HUMAN 3 UNIT(S)/HR: 100 INJECTION, SOLUTION SUBCUTANEOUS at 03:50

## 2023-11-09 RX ADMIN — PROPOFOL 11.9 MICROGRAM(S)/KG/MIN: 10 INJECTION, EMULSION INTRAVENOUS at 19:52

## 2023-11-09 RX ADMIN — Medication 400 MILLIGRAM(S): at 21:39

## 2023-11-09 RX ADMIN — ATORVASTATIN CALCIUM 80 MILLIGRAM(S): 80 TABLET, FILM COATED ORAL at 21:43

## 2023-11-09 RX ADMIN — AMIODARONE HYDROCHLORIDE 33.3 MG/MIN: 400 TABLET ORAL at 03:56

## 2023-11-09 RX ADMIN — INSULIN HUMAN 3 UNIT(S)/HR: 100 INJECTION, SOLUTION SUBCUTANEOUS at 19:52

## 2023-11-09 RX ADMIN — PROPOFOL 11.9 MICROGRAM(S)/KG/MIN: 10 INJECTION, EMULSION INTRAVENOUS at 03:57

## 2023-11-09 RX ADMIN — Medication 80 MILLIGRAM(S): at 05:38

## 2023-11-09 NOTE — PROGRESS NOTE ADULT - CRITICAL CARE ATTENDING COMMENT
History of CAD s/p PCI  Admitted with cardiogenic shock and respiratory failure in the setting of stent restenosis  Transferred out yesterday and overnight had VT/VF cardiac arrest  Intubated during arrest, ROSC achieved, readmitted to CICU, [place] placed  Sedated with Propofol, follows commands off sedation - target RASS -1  Hold Seroquel and Ativan  Hemodynamics acceptable, no pressors or inotropes, CI ~ 3   VT/VF arrest, on Amiodarone and Lidocaine infusions, ? if ischemia induced - place IABP  Continue DAPT with ASA/Ticagrelor for coronary disease with prior PCI  Acute hypoxic respiratory failure requiring mechanical ventilation - wean to minimal settings  NPO  Non-oliguric ARIC, filling pressures acceptable - target even  H/H low but acceptable on Heparin drip for LV thrombus  Afebrile, no antibiotics, +COVID - defer Remdesivir per ID   Sugars controlled on Insulin drip  ANAY Farnsworth/rangel Roberts 11/9

## 2023-11-09 NOTE — PROGRESS NOTE ADULT - SUBJECTIVE AND OBJECTIVE BOX
LD VALENCIA  58y Male  MRN:[id number]    Patient is a 58y old  Male who presents with a chief complaint of NSTEMI (01 Nov 2023 20:29)    HPI:  57yo M w/ hx HTN, CAD w/ 1 stent in 2009, ICH (2008) presenting with abn ekg. Patient presented to [facility] where he was found to have STEMI, recommended to get cath however patient did not want to get it there so it left and came here.  Patient initially had cough, congestion, fever, was placed on antibiotics on Sunday.  Started feeling nauseous and had a presyncopal event after which he presented to ED last night.  Had chest pain as well.  Chest pain is midsternal.  Not currently having chest pain.  Received 4 aspirin 30 min pta. (01 Nov 2023 15:11)      Patient seen and evaluated at bedside in CCU. interval events noted    Interval HPI: pt with cardiac arrest o/n, s/p ACLS with ROSC. re-intubated. back in ccu.     PAST MEDICAL & SURGICAL HISTORY:  HTN (hypertension)      CAD (coronary artery disease)  2009; stent      Intracranial hemorrhage  2008      Respiratory arrest  december 1st      Myocardial infarction, unspecified MI type, unspecified artery      History of coronary artery stent placement          REVIEW OF SYSTEMS:  as per hpi     VITALS:   ICU Vital Signs Last 24 Hrs  T(C): 37.1 (09 Nov 2023 12:00), Max: 37.2 (08 Nov 2023 19:00)  T(F): 98.7 (09 Nov 2023 12:00), Max: 99 (08 Nov 2023 19:00)  HR: 96 (09 Nov 2023 12:25) (70 - 126)  BP: 94/57 (09 Nov 2023 01:00) (94/57 - 134/80)  BP(mean): 71 (09 Nov 2023 01:00) (71 - 100)  ABP: 105/57 (09 Nov 2023 12:00) (91/55 - 145/69)  ABP(mean): 73 (09 Nov 2023 12:00) (64 - 95)  RR: 24 (09 Nov 2023 12:00) (17 - 26)  SpO2: 96% (09 Nov 2023 12:25) (91% - 99%)    O2 Parameters below as of 09 Nov 2023 03:00  Patient On (Oxygen Delivery Method): ventilator                 PHYSICAL EXAM:  GENERAL: intubated  HEAD:  Atraumatic, Normocephalic  EYES: EOMI, PERRLA, conjunctiva and sclera clear  NECK: Supple, No JVD  CHEST/LUNG: Clear to auscultation bilaterally; No wheeze  HEART: Regular rate and rhythm; No murmurs, rubs, or gallops  ABDOMEN: Soft, Nontender, Nondistended; Bowel sounds present  EXTREMITIES:  2+ Peripheral Pulses, No clubbing, cyanosis, or edema  NEUROLOGY: intubated/sedated   SKIN: No rashes or lesions    Consultant(s) Notes Reviewed:  [x ] YES  [ ] NO  Care Discussed with Consultants/Other Providers [ x] YES  [ ] NO    MEDS:   MEDICATIONS  (STANDING):  aMIOdarone Infusion 0.5 mG/Min (16.7 mL/Hr) IV Continuous <Continuous>  aspirin  chewable 81 milliGRAM(s) Oral daily  atorvastatin 80 milliGRAM(s) Oral at bedtime  budesonide  80 MICROgram(s)/formoterol 4.5 MICROgram(s) Inhaler 2 Puff(s) Inhalation two times a day  chlorhexidine 2% Cloths 1 Application(s) Topical daily  dextrose 50% Injectable 12.5 Gram(s) IV Push once  dextrose 50% Injectable 25 Gram(s) IV Push once  furosemide   Injectable 80 milliGRAM(s) IV Push daily  glucagon  Injectable 1 milliGRAM(s) IntraMuscular once  heparin  Infusion 1600 Unit(s)/Hr (17 mL/Hr) IV Continuous <Continuous>  insulin regular Infusion 3 Unit(s)/Hr (3 mL/Hr) IV Continuous <Continuous>  lidocaine   Infusion 2 mG/Min (15 mL/Hr) IV Continuous <Continuous>  pantoprazole  Injectable 40 milliGRAM(s) IV Push daily  propofol Infusion 20 MICROgram(s)/kG/Min (11.9 mL/Hr) IV Continuous <Continuous>  spironolactone 25 milliGRAM(s) Oral daily  ticagrelor 90 milliGRAM(s) Oral every 12 hours  tiotropium 2.5 MICROgram(s) Inhaler 2 Puff(s) Inhalation daily    MEDICATIONS  (PRN):  albuterol/ipratropium for Nebulization 3 milliLiter(s) Nebulizer every 6 hours PRN Shortness of Breath and/or Wheezing      ALLERGIES:  penicillins (Unknown)      LABS:                                                                                                         12.8   17.91 )-----------( 319      ( 09 Nov 2023 01:44 )             38.1   11-09    131<L>  |  98  |  47<H>  ----------------------------<  296<H>  3.8   |  16<L>  |  2.75<H>    Ca    9.0      09 Nov 2023 05:29  Phos  4.1     11-09  Mg     3.4     11-09    TPro  7.1  /  Alb  3.3  /  TBili  0.7  /  DBili  x   /  AST  186<H>  /  ALT  173<H>  /  AlkPhos  87  11-09       < from: Xray Chest 1 View- PORTABLE-Urgent (11.01.23 @ 07:42) >    IMPRESSION:  Clear lungs.    ---End of Report ---        < end of copied text >  < from: TTE W or WO Ultrasound Enhancing Agent (11.01.23 @ 10:23) >  _____________________________     CONCLUSIONS:      1. Left ventricular cavity is moderately dilated. Left ventricular wall thickness is normal. Left ventricular systolic function is severely decreased with an ejection fraction of 32 % by Chinchilla's method of disks. Regional wall motion abnormalities present.   2. Multiple segmental abnormalities exist. See findings.   3. There is moderate (grade 2) left ventricular diastolic dysfunction, with indeterminant filling pressure.   4. Normal right ventricular cavity size, wall thickness, and systolic function.   5. No significant valvular disease.   6. No pericardial effusion seen.   7. Compared to the transthoracic echocardiogram performed on 1/25/2017 the areas of akinesis are unchanged but there has been a decline in LV systolic function with new areas of hypokinesis.    __________________________________________________________________    < end of copied text >  < from: TTE Limited W or WO Ultrasound Enhancing Agent (11.02.23 @ 07:41) >  __________________________     CONCLUSIONS:      1. After obtaining consent, Definity ultrasound enhancing agent was given for enhanced left ventricular opacification and improved delineation of the left ventricular endocardial borders. Left ventricular systolic function is severely decreased with a calculated ejection fraction of 22 % by the Chinchilla's biplane method of disks. There is a left ventricular thrombus.   2. Findings were discussed with Litzy BOSS on 11/2/2023 at 8.49am.   3. There is a left ventricular thrombus.    _________________________________________________________________    < end of copied text >

## 2023-11-09 NOTE — CONSULT NOTE ADULT - ASSESSMENT
58M with HTN, CAD (s/p PCI 2008), ICH 2008, HFrEF (EF severely reduced 2018) presenting with chest pressure on 11/1. Prior to cath was intubated 2/2 AHRF, s/p LHC showed pLAD 70 % stenosis in the ostial portion of the segment and 100 % in-stent restenosis, 100 % stenosis mRCA and RHC revealing elevated filling pressures and marginal perfusion indices for which IABP was inserted. He was admitted to CICU, extubated to nasal canula on 11/3, IABP weaned and discontinued 11/7. While in CICU he was diuresed and weaned off inotropes, intiated on PO GDMT, was downgraded to the floor on 11/8. Of note, he was pending cardiac viability study; however, had been deferred for CABG by CTS. On 11/8 he had witnessed seizure activity by his family and lost pulse. Code blue called for PEA arrest, telemetry reveals VT/VF. ROSC achieved after shock delivered x3, Lidocaine x1, Amio x1 and Mg x1 given. EP consulted for VT/VF arrest.     1) HFrEF, 32% w/ WMA  2) CAD   3) cardiogenic shock   4) VT/VF arrest    - Continue Lido gtt @2 and Amio gtt @ 1. He remains intubated and sedated on Propofol, electrically quiet since arrest  - Continue Lido through Propofol wean to extubation   - Pending viabilty study, HF to l  - Will need secondary prevention ICD prior to discharge   58M with HTN, CAD (s/p PCI 2008), ICH 2008, HFrEF (EF severely reduced 2018) presenting with chest pressure on 11/1. Prior to cath was intubated 2/2 AHRF, s/p LHC showed pLAD 70 % stenosis in the ostial portion of the segment and 100 % in-stent restenosis, 100 % stenosis mRCA and RHC revealing elevated filling pressures and marginal perfusion indices for which IABP was inserted. He was admitted to CICU, extubated to nasal canula on 11/3, IABP weaned and discontinued 11/7. While in CICU he was diuresed and weaned off inotropes, intiated on PO GDMT, was downgraded to the floor on 11/8. Of note, he was pending cardiac viability study; however, had been deferred for CABG by CTS. On 11/8 he had witnessed seizure activity by his family and lost pulse. Code blue called for PEA arrest, telemetry reveals VT/VF. ROSC achieved after shock delivered x3, Lidocaine x1, Amio x1 and Mg x1 given. EP consulted for VT/VF arrest.     1) HFrEF, 32% w/ WMA  2) CAD   3) cardiogenic shock   4) VT/VF arrest    - Continue Lido gtt @2 and Amio gtt @ 1. He remains intubated and sedated on Propofol, electrically quiet since arrest  - Continue Lido through Propofol wean to extubation   - Pending viabilty study, HF following for GDMT/? AT eval  - Will need secondary prevention ICD prior to discharge

## 2023-11-09 NOTE — CHART NOTE - NSCHARTNOTEFT_GEN_A_CORE
Patient is a 58y old  Male who presents with a chief complaint of Mix Cardiogenic Septic shock. Code blue called on patient status post ICU downgrade. Per family/DTR  described events leading up to code blue being called as follows. Patient and family status post dinner then started to complain of chest pressure and bliteral hand numbness. Immediately there after became unresponsive and daughter called out for help  thinking her father was having seizure like activity. Code blue called. CPR started and epi x2, bicarb x1 given for PEA arrest. Pt then went into Vfib, shock delivered x3, lidocaine x1, amio x1 and mg x1 given. Achieved ROSC, initial /76 then decreased to 77/52. Neox1 given and levo started. Last BP during rapid 133/79. Intubated Defibrillation epi x2, bicarb x1, lidocaine x1, amio x1, Mg x1, donya x1, levo gtt Pt had PEA arrest that converted to vfib, ROSC and transferred back to CCU.  Called wife but DTR at bedside was on the phone with spouse ( her mother).

## 2023-11-09 NOTE — PROGRESS NOTE ADULT - ASSESSMENT
59 yo M with HTN, CAD (s/p PCI 2008), HFrEF (EF severely reduced 2018) not on GDMT, CVA 2018 (requiring tpA), DM presenting with c/o SOB for a week before presenting to the hospital. He had been treated for PNA. He was admitted with c/o chest pressure. Per family, patient passed out, EMS was called and reportedly defibrillated the patient. He was taken to [facility] where he was found to have ACS but he left AMA and came to [facility].    On arrival, ECG showed ST depression in lateral leads, no c/o chest pain. Pro-BNP 4k. Patient was intubated prior to LCHC on 11/1 for respiratory failure. LHC 11/1: pLAD 70 % stenosis in the ostium portion of the segment and 100 % in-stent restenosis. mRCA, 100 % stenosis. RA 23, PA 51/33, wedge 31, PA sat 54%, CI 1.5. S/p IABP on 11/1. He was successfully extubated to nasal canula on 11/3 and was also noted to be COVID positive currently with persistent fevers. S/p IABP for hemodynamic support in the setting of possible sepsis.       Cardiac Studies  11/1/23  [facility] showed pLAD 70 % stenosis in the ostium portion of the segment and 100 % in-stent restenosis and in mRCA, 100 % stenosis.   11/1/23 RHC; RA 23 Vw 24, PA 52/33/40, PCWP 30 Vw 33, AO 85/66/73, PA sat 54.4%, CO/CI (F) 2.96/1.44, IABP was placed during the cath through R common femoral artery.   11/1/23 TTE: LVIDd 6.4cm , LVEF 32%, regional WMA, grade II DD,  TAPSE 1.7cm, mld MR, trace TR,     Bedside hemodynamics  11/3: IABP 1:1 RA 5; PA 27/12/18; CO/CI 5.6/2.6; MAP 90-100s.  11/4/23 IABP 1:3 CVP 4 PA 37/18 SvO2 83.8 CO/CI 11.2 CI 5.1  (in the setting of fever to 38.3C)  11/5 IABP 1:1 CVP 3 PA 48/27 SvO2 78.4% CO 8.2 CI 3.7   11/7 IABP removed  11/9 IABP replaced

## 2023-11-09 NOTE — PROGRESS NOTE ADULT - ATTENDING COMMENTS
53 yo M with known CAD and HFrEF (TTE 2018 showed low LVEF with anteroseptal akinesis) who presented with COVID infection, NSTEMI and CS.  RHC showed biventricular congestion and low CI. LHC showed occluded LAD and severe diffuse RCA disease (filling via L-R collaterals)  Improved with IABP and afterload reduction.     Initially failed IABP wean with hypotension, reduction of CI and worsening Cr, LFTs and Na.  On 11/6: RA~13, PAd~27 and CI~3 on IABP 1:1 and Nipride @2mcg/kg/min     After diuresis, he was weaned successfully without inotropes  Cr 1.4, baseline 1.1  AST/ALT improving close to normal now   CVP~12-13 on 11/7 - Cordis then removed    Overnight 11/8 had PMVT cardiac arrest with prompt CPR and defibrillation  Returned to CICU intubated and sedated on Lido and Amio gtt with persistent ectopy  Discussion held about IABP overnight but IC felt patient would be ok without it  PAC placed: CVP-12, PAP-60/30, CI>2.5LPM/m2 on no inotropes     - Place IABP   - Extubate for discussion about launching AT evaluation - spoke at length with wife and PCP today  - Defer CMRI viability  - Continue Amio and Lido gtt   - Continue diuresis for goal CVP <9  - Continue Isordil/Hydral  - Continue Spironolactone 25 mg daily  - Continue ASA and Ticagrelor    *Please keep primary Dr. Banuelos [phone] in the loop per family request    Erlin Quijano MD

## 2023-11-09 NOTE — CONSULT NOTE ADULT - SUBJECTIVE AND OBJECTIVE BOX
CHIEF COMPLAINT: VT/VF arrest    HISTORY OF PRESENT ILLNESS:  58M with HTN, CAD (s/p PCI 2008), ICH 2008, HFrEF (EF severely reduced 2018) presenting with chest pressure. He notes that he was feeling SOB for the last week, was told he had bilateral pneumonia and started on antibiotics. Has been coughing and having intermittent chest pressure. On the night of presentation, he states that he was diaphoretic, nauseous, and leg tingling. Patient is a poor historian, but per family he passed out briefly for a few seconds and had a heart rate of 180's. EMS was called, who defibrillated patient at his home, no strips available. Patient taken to [facility], loaded with ASA. Patient left AMA because he wanted to be seen at [place].    On arrival, EKG with ST depressions in lateral leads, LVH, ST elevations in anterior leads not meeting STEMI criteria. Not complaining of chest pain. Troponin 440 -> 414. Pro-BNP 4168. Creatinine 1.25, AST/ALT 94/50, became dyspneic and diaphoretic so was intubated. Brought to lab and LHC showed pLAD 70 % stenosis in the ostial portion of the segment and 100 % in-stent restenosis, 100 % stenosis mRCA. Also found to have elevated filling pressures and marginal perfusion indices. Therefore, IABP was placed during the cath through R common femoral artery. He was successfully extubated to nasal canula on 11/3 and was also noted to be COVID positive currently with persistent fevers. IABP weaned and discontinued 11/7. While in CICU he was diuresed and weaned off inotropes, intiated on PO GDMT, was downgraded to the floor on 11/8. Of note, he is pending cardiac viability study; however, had been deferred for CABG by CTS. On 11/8 he had witnessed seizure activity by his family and lost pulse. Code blue called for PEA arrest, telemetry reveals VT/VF. ROSC achieved after shock delivered x3, Lidocaine x1, Amio x1 and Mg x1 given. EP consulted for VT/VF arrest.     Seen today in CICU, in SR w/ occ PVC's, on Lido gtt @2 and Amio gtt @ 1. He remains intubated and sedated on Propofol.     Allergies    penicillins (Unknown)    Intolerances    	    MEDICATIONS:  aMIOdarone Infusion 0.5 mG/Min IV Continuous <Continuous>  aspirin  chewable 81 milliGRAM(s) Oral daily  furosemide   Injectable 80 milliGRAM(s) IV Push daily  heparin  Infusion 1600 Unit(s)/Hr IV Continuous <Continuous>  lidocaine   Infusion 2 mG/Min IV Continuous <Continuous>  spironolactone 25 milliGRAM(s) Oral daily  ticagrelor 90 milliGRAM(s) Oral every 12 hours      albuterol/ipratropium for Nebulization 3 milliLiter(s) Nebulizer every 6 hours PRN  budesonide  80 MICROgram(s)/formoterol 4.5 MICROgram(s) Inhaler 2 Puff(s) Inhalation two times a day  tiotropium 2.5 MICROgram(s) Inhaler 2 Puff(s) Inhalation daily    propofol Infusion 20 MICROgram(s)/kG/Min IV Continuous <Continuous>    pantoprazole  Injectable 40 milliGRAM(s) IV Push daily    atorvastatin 80 milliGRAM(s) Oral at bedtime  dextrose 50% Injectable 25 Gram(s) IV Push once  dextrose 50% Injectable 12.5 Gram(s) IV Push once  glucagon  Injectable 1 milliGRAM(s) IntraMuscular once  insulin regular Infusion 3 Unit(s)/Hr IV Continuous <Continuous>    chlorhexidine 2% Cloths 1 Application(s) Topical daily      PAST MEDICAL & SURGICAL HISTORY:  HTN (hypertension)      CAD (coronary artery disease)  2009; stent      Intracranial hemorrhage  2008      Respiratory arrest  december 1st      Myocardial infarction, unspecified MI type, unspecified artery      History of coronary artery stent placement          FAMILY HISTORY:  Family history of meningitis (Sibling)  Brother, death at age 49 from complications of infection (June 2015)    Family history of hypertension in mother  Mother        SOCIAL HISTORY:    [place] at this time, 2/2 patient intubated, sedated      REVIEW OF SYSTEMS:  See HPI. Otherwise, 10 point ROS done and otherwise negative.    PHYSICAL EXAM:  T(C): 37.1 (11-09-23 @ 12:00), Max: 37.2 (11-08-23 @ 19:00)  HR: 89 (11-09-23 @ 14:00) (70 - 126)  BP: 94/57 (11-09-23 @ 01:00) (94/57 - 134/80)  RR: 31 (11-09-23 @ 14:00) (17 - 31)  SpO2: 95% (11-09-23 @ 14:00) (92% - 99%)  Wt(kg): --  I&O's Summary    08 Nov 2023 07:01  -  09 Nov 2023 07:00  --------------------------------------------------------  IN: 1651 mL / OUT: 1780 mL / NET: -129 mL    09 Nov 2023 07:01  -  09 Nov 2023 15:07  --------------------------------------------------------  IN: 469.6 mL / OUT: 750 mL / NET: -280.4 mL        Appearance: Intubated, sedated  HEENT:   NC/AT	  Cardiovascular: +S1S2 RRR no m/g/r  Respiratory: CTA B/L	  Gastrointestinal:  Soft, NT.ND., + BS	  Skin: No rashes, masses or lesions  Neurologic: Non-focal, grossly  Extremities: No edema BLE  Vascular: Peripheral pulses palpable 2+ bilaterally      LABS:	 	    CBC Full  -  ( 09 Nov 2023 01:44 )  WBC Count : 17.91 K/uL  Hemoglobin : 12.8 g/dL  Hematocrit : 38.1 %  Platelet Count - Automated : 319 K/uL  Mean Cell Volume : 85.2 fl  Mean Cell Hemoglobin : 28.6 pg  Mean Cell Hemoglobin Concentration : 33.6 gm/dL  Auto Neutrophil # : 14.95 K/uL  Auto Lymphocyte # : 1.09 K/uL  Auto Monocyte # : 0.93 K/uL  Auto Eosinophil # : 0.30 K/uL  Auto Basophil # : 0.16 K/uL  Auto Neutrophil % : 83.5 %  Auto Lymphocyte % : 6.1 %  Auto Monocyte % : 5.2 %  Auto Eosinophil % : 1.7 %  Auto Basophil % : 0.9 %    11-09    131<L>  |  98  |  47<H>  ----------------------------<  296<H>  3.8   |  16<L>  |  2.75<H>  11-09    131<L>  |  95<L>  |  44<H>  ----------------------------<  366<H>  3.8   |  15<L>  |  2.37<H>    Ca    9.0      09 Nov 2023 05:29  Ca    9.2      09 Nov 2023 01:44  Phos  4.1     11-09  Phos  5.6     11-09  Mg     3.4     11-09  Mg     3.3     11-09    TPro  7.1  /  Alb  3.3  /  TBili  0.7  /  DBili  x   /  AST  186<H>  /  ALT  173<H>  /  AlkPhos  87  11-09  TPro  7.3  /  Alb  3.5  /  TBili  1.0  /  DBili  x   /  AST  208<H>  /  ALT  191<H>  /  AlkPhos  96  11-09      proBNP: Pro-Brain Natriuretic Peptide: 4168 pg/mL (11.01.23 @ 06:14)    TSH: Thyroid Stimulating Hormone, Serum: 0.50 uIU/mL (11.03.23 @ 01:50)    CARDIAC MARKERS: Troponin T, High Sensitivity Result: 1226: *  *  Rapid upward or downward changes in high-sensitivity troponin levels  suggest acute myocardial injury. Renal impairment may cause sustained  troponin elevations.  Normal: <6 - 14 ng/L  Indeterminate: 15-51 ng/L  Elevated: > 51 ng/L  See http://labs/test/TROPTHS on the [facility] intranet for more  information ng/L (11.03.23 @ 01:49)    TELEMETRY: 	  SR occ PVC      Echo:  < from: TTE W or WO Ultrasound Enhancing Agent (11.01.23 @ 10:23) >     CONCLUSIONS:      1. Left ventricular cavity is moderately dilated. Left ventricular wall thickness is normal. Left ventricular systolic function is severely decreased with an ejection fraction of 32 % by Chinchilla's method of disks. Regional wall motion abnormalities present.   2. Multiple segmental abnormalities exist. See findings.   3. There is moderate (grade 2) left ventricular diastolic dysfunction, with indeterminant filling pressure.   4. Normal right ventricular cavity size, wall thickness, and systolic function.   5. No significant valvular disease.   6. No pericardial effusion seen.   7. Compared to the transthoracic echocardiogram performed on 1/25/2017 the areas of akinesis are unchanged but there has been a decline in LV systolic function with new areas of hypokinesis.    ________________________________________________________________________________________  FINDINGS:     Left Ventricle:  The left ventricular cavity is moderately dilated. Left ventricular wall thickness is normal. Left ventricular systolic function is severely decreased with a calculated ejection fraction of 32 % by the Chinchilla's biplane method of disks. There are regional wall motion abnormalities present. There is moderate (grade 2) left ventricular diastolic dysfunction, with indeterminant filling pressure. There is no evidenceof a left ventricular thrombus.  LV Wall Scoring: The mid and apical anterior wall, mid and apical anterior  septum, mid and apical inferior septum, and apex are akinetic. The entire  lateral wall, entire inferior wall, basal anteroseptal segment, basal  inferoseptal segment, and basal anterior segment are hypokinetic.          Right Ventricle:  The right ventricular cavity is normal in size, normal wall thickness and normal systolic function. Tricuspid annular plane systolic excursion (TAPSE) is1.7 cm (normal >=1.7 cm). Tricuspid annular tissue Doppler S' is 12.0 cm/s (normal >10 cm/s).     Left Atrium:  The left atrium is mildly dilated in size with an indexed volume of 43.28 ml/m².     Right Atrium:  The right atrium is normal in size with an indexed volume of 30.22 ml/m².     Interatrial Septum:  The interatrial septum appears intact.     Aortic Valve:  The aortic valve appears trileaflet. There is no aortic valve stenosis. There is trace aortic regurgitation.     Mitral Valve:  There is calcification of the mitral valve annulus. There is no mitral valve stenosis. There is mild mitral regurgitation.     Tricuspid Valve:  Structurally normal tricuspid valve with normal leaflet excursion. There is trace tricuspid regurgitation.     Pulmonic Valve:  Structurally normal pulmonic valve with normal leaflet excursion. There is trace pulmonic regurgitation.     Aorta:  The aortic annulus and aortic root appear normal in size.     Pericardium:  No pericardial effusion seen.     Systemic Veins:  The inferior vena cava is normal in size (normal <2.1cm) with abnormal inspiratory collapse (abnormal <50%) consistent with mildly elevated right atrial pressure (~8, range 5-10mmHg).  ____________________________________________________________________    < end of copied text >         < from: TTE Limited W or WO Ultrasound Enhancing Agent (11.02.23 @ 07:41) >  _______________________________________________________________________________________     CONCLUSIONS:      1. After obtaining consent, Definity ultrasound enhancing agent was given for enhanced left ventricular opacification and improved delineation of the left ventricular endocardial borders. Left ventricular systolic function is severely decreased with a calculated ejection fraction of 22 % by the Chinchilla's biplane method of disks. There is a left ventricular thrombus.   2. Findings were discussed with Litzy BOSS on 11/2/2023 at 8.49am.   3. There is a left ventricular thrombus.    ________________________________________________________________________________________  FINDINGS:     Left Ventricle:  After obtaining consent, Definity ultrasound enhancing agent was given for enhanced left ventricular opacification and improved delineation of the left ventricular endocardial borders. Left ventricular systolic function is severely decreased with a calculated ejection fraction of 22 % by the Chinchilla's biplane method of disks. There is global left ventricular hypokinesis. There is a left ventricular thrombus.     Right Ventricle:  Tricuspid annular plane systolic excursion (TAPSE) is 1.6 cm (normal >=1.7 cm).    < end of copied text >      Catheterization: < from: Cardiac Catheterization (11.01.23 @ 14:34) >  Diagnostic Findings:     Coronary Angiography   The coronary circulation is right dominant.      LM   Left main artery: There is a 20 % stenosis.      LAD   Proximal left anterior descending: There is a 70 % stenosis in the  ostium portion of the segment. Proximal left anterior  descending: There is a 100 % in-stent restenosis.      CX   Proximal circumflex: There is a 55 % stenosis. Mid circumflex: There  is a 50 % stenosis. First obtuse marginal: There is a 50  % stenosis.      RCA   Mid right coronary artery: There is a 100 % stenosis.      < end of copied text >

## 2023-11-09 NOTE — CHART NOTE - NSCHARTNOTEFT_GEN_A_CORE
CICU Accept Note    Transfer from: Telemetry [facility]    Accepting physician: Dr. Lees    HPI: 58M Hx HTN, CAD (s/p PCI ), HFrEF (EF severely reduced ) not on GDMT, CVA  (requiring tpA), DM presenting with c/o SOB for a week before presenting to the hospital. He had been treated for PNA. He was admitted with c/o chest pressure. Per family, patient passed out, EMS was called and reportedly defibrillated the patient. He was taken to [facility] where he was found to have ACS but he left AMA and came to [facility].  On arrival, ECG showed ST depression in lateral leads, no c/o chest pain. Pro-BNP 4k. Patient was intubated prior to LCHC on  for respiratory failure. LHC : pLAD 70 % stenosis in the ostium portion of the segment and 100 % in-stent restenosis. mRCA, 100 % stenosis. RA 23, PA 51/33, wedge 31, PA sat 54%, CI 1.5. S/p IABP on . He was successfully extubated to nasal canula on 11/3 and was also noted to be COVID positive currently with persistent fevers. S/p IABP for hemodynamic support in the setting of possible sepsis.     Cardiac Studies  23  [facility] showed pLAD 70 % stenosis in the ostium portion of the segment and 100 % in-stent restenosis and in mRCA, 100 % stenosis.   23 RHC; RA 23 Vw 24, PA 52/33/40, PCWP 30 Vw 33, AO 85/66/73, PA sat 54.4%, CO/CI (F) 2.96/1.44, IABP was placed during the cath through R common femoral artery.   23 TTE: LVIDd 6.4cm , LVEF 32%, regional WMA, grade II DD,  TAPSE 1.7cm, mld MR, trace TR,     Bedside hemodynamics  11/3: IABP 1:1 RA 5; PA 27/12/18; CO/CI 5.6/2.6; MAP 90-100s.  23 IABP 1:3 CVP 4 PA 37/18 SvO2 83.8 CO/CI 11.2 CI 5.1  (in the setting of fever to 38.3C)   IABP 1:1 CVP 3 PA 48/27 SvO2 78.4% CO 8.2 CI 3.7      IABP removed     Patient transferred to Telemetry, s/p Vfib arrest requiring multiple shocks/epi/lido/amio, ROSC ~10mins, transferred back to CICU    REVIEW OF SYSTEMS:  - Unable to obtain, intubated/sedated     ICU Vital Signs Last 24 Hrs  T(C): 37.2 (2023 22:02), Max: 37.2 (2023 19:00)  T(F): 99 (2023 22:02), Max: 99 (2023 19:00)  HR: 126 (2023 00:06) (68 - 126)  BP: 117/77 (2023 22:02) (117/77 - 134/80)  BP(mean): 83 (2023 21:00) (83 - 100)  ABP: 145/69 (2023 18:15) (95/50 - 145/69)  ABP(mean): 95 (2023 18:15) (68 - 95)  RR: 19 (:02) (15 - 26)  SpO2: 92% (2023 22:02) (90% - 99%)    O2 Parameters below as of 2023 22:02  Patient On (Oxygen Delivery Method): room air      Mode: AC/ CMV (Assist Control/ Continuous Mandatory Ventilation), RR (machine): 18, TV (machine): 500, FiO2: 100, PEEP: 5  CVP(mm Hg): 15 (23 @ 10:00) (5 - 31)  PA: 38/24 (23 @ 10:00) (22/6 - 54/39)  PA(mean): 28 (23 @ 10:00) (14 - 45)      I&O's Summary    2023 07:01  -  2023 07:00  --------------------------------------------------------  IN: 1232.4 mL / OUT: 3705 mL / NET: -2472.6 mL    2023 07:01  -  2023 01:31  --------------------------------------------------------  IN: 1033.2 mL / OUT: 1485 mL / NET: -451.8 mL      CAPILLARY BLOOD GLUCOSE  POCT Blood Glucose.: 209 mg/dL (2023 23:21)  ============================I/O===========================   I&O's Detail    2023 07:01  -  2023 07:00  --------------------------------------------------------  IN:    Dexmedetomidine: 492.7 mL    Heparin: 234 mL    Nitroprusside: 505.7 mL  Total IN: 1232.4 mL    OUT:    Indwelling Catheter - Urethral (mL): 3705 mL  Total OUT: 3705 mL    Total NET: -2472.6 mL      2023 07:01  -  2023 01:31  --------------------------------------------------------  IN:    Dexmedetomidine: 118.8 mL    Heparin: 187 mL    Nitroprusside: 7.4 mL    Oral Fluid: 720 mL  Total IN: 1033.2 mL    OUT:    Indwelling Catheter - Urethral (mL): 1485 mL  Total OUT: 1485 mL    Total NET: -451.8 mL  ============================ LABS =========================                      11.5   8.05  )-----------( 212      ( 2023 01:36 )             34.4         131<L>  |  102  |  26<H>  ----------------------------<  139<H>  4.3   |  17<L>  |  1.41<H>    Ca    9.2      2023 01:36  Phos  3.8       Mg     2.2         TPro  6.7  /  Alb  3.2<L>  /  TBili  0.8  /  DBili  x   /  AST  37  /  ALT  48<H>  /  AlkPhos  46      LIVER FUNCTIONS - ( 2023 01:36 )  Alb: 3.2 g/dL / Pro: 6.7 g/dL / ALK PHOS: 46 U/L / ALT: 48 U/L / AST: 37 U/L / GGT: x           PT/INR - ( 2023 03:23 )   PT: 12.2 sec;   INR: 1.11 ratio    PTT - ( 2023 08:22 )  PTT:80.6 sec    ABG - ( 2023 01:01 )  pH, Arterial: 7.44  pH, Blood: x     /  pCO2: 29    /  pO2: 109   / HCO3: 20    / Base Excess: -3.4  /  SaO2: 97.9      Blood Gas Arterial, Lactate: 0.8 mmol/L (23 @ 01:01)  Blood Gas Arterial, Lactate: 0.7 mmol/L (23 @ 17:40)  Blood Gas Arterial, Lactate: 0.8 mmol/L (23 @ 12:50)  Blood Gas Venous - Lactate: 0.7 mmol/L (23 @ 00:37)  Blood Gas Arterial, Lactate: 0.7 mmol/L (23 @ 00:37)  Blood Gas Venous - Lactate: 0.8 mmol/L (23 @ 20:15)  Blood Gas Venous - Lactate: 0.9 mmol/L (23 @ 17:50)  Blood Gas Arterial, Lactate: 1.0 mmol/L (23 @ 03:17)  Blood Gas Arterial, Lactate: 0.9 mmol/L (23 @ 01:47)    Urinalysis Basic - ( 2023 01:36 )    Color: x / Appearance: x / SG: x / pH: x  Gluc: 139 mg/dL / Ketone: x  / Bili: x / Urobili: x   Blood: x / Protein: x / Nitrite: x   Leuk Esterase: x / RBC: x / WBC x   Sq Epi: x / Non Sq Epi: x / Bacteria: x  ======================Micro/Rad/Cardio=================  Telemetry: Reviewed   EKG: Reviewed  CXR: Reviewed  Culture: Reviewed   Echo:   Cath: Cardiac Cath Lab - Adult:   [facility]  Department of Cardiology  [address] [place] [address]  [phone]  Cath Lab Report -- Comprehensive Report  Patient: LD VALENCIA  Study date: 2017  Account number: [id number]  MR number: [id number]  : 1965  Gender: Male  Race: W  Case Physician(s):  Jairon Holguin M.D.  Referring Physician:  Luc Lynn M.D.  INDICATIONS: Unstable angina - CCS4.  HISTORY: The patient has a history of coronary artery disease. The patient  hashypertension and medication-treated dyslipidemia.  PROCEDURE:  --  Left heart catheterization with ventriculography.  --  Left coronary angiography.  --  Right coronary angiography.  TECHNIQUE: The risks and alternatives of the procedures and conscious  sedation were explained to the patient and informed consent was obtained.  Cardiac catheterization performed electively.  Local anesthetic given. Right radial artery access. A 6FR PRELUDE KIT was  inserted in the vessel. Left heart catheterization. Ventriculography was  performed. A 5FR FR4.0 EXPO catheter was utilized. Left coronary artery  angiography. The vessel was injected utilizing a 5FR FL3.5 EXPO catheter.  Right coronary artery angiography. The vessel was injected utilizing a 5FR  FR4.0 EXPO catheter. RADIATION EXPOSURE: 1.1 min.  CONTRAST GIVEN: Omnipaque 55 ml.  MEDICATIONS GIVEN: Midazolam, 1 mg, IV. Fentanyl, 25 mcg, IV. Verapamil  (Isoptin, Calan, Covera), 2.5 mg, IA. Heparin, 3000 units, IA.  VENTRICLES: Global left ventricular function was moderately depressed. EF  estimated was 40 %.  CORONARY VESSELS: The coronary circulation is right dominant.  LM:   --  LM: Normal.  LAD:   --  Proximal LAD: There was a 50 % stenosis.  CX:   --  Circumflex: Normal.  RCA:   --  Mid RCA: There was a 40 % stenosis.  --  Distal RCA: There was a 50 % stenosis.  COMPLICATIONS: There were no complications.  DIAGNOSTIC RECOMMENDATIONS: The patient should continue with the present  medications.  Prepared and signed by  Jairon Holguin M.D.  Signed 2017 12:20:13  HEMODYNAMIC TABLES  Pressures:  Baseline/ Room Air  Pressures:  - HR: 78  Pressures:  - Rhythm:  Pressures:  -- Aortic Pressure (S/D/M): --/--/99  Pressures:  -- Left Ventricle (s/edp): 157/39/--  Outputs:  Baseline/ Room Air  Outputs:  -- CALCULATIONS[age]  Outputs:  -- CALCULATIONS: Body Surface Area: 2.05  Outputs:  -- CALCULATIONS: Height in cm: 175.00  Outputs:  -- CALCULATIONS: Sex: Male  Outputs:  -- CALCULATIONS: Weight in k.40 (17 @ 21:55)  ======================================================  PAST MEDICAL & SURGICAL HISTORY:  HTN (hypertension)    CAD (coronary artery disease)  ; stent    Intracranial hemorrhage      Respiratory arrest      Myocardial infarction, unspecified MI type, unspecified artery    History of coronary artery stent placement    A/P: A/P: 59 yo M with HTN, CAD (s/p PCI ), HFrEF, CVA , and T2DM presenting with chest pressure and unknown tachycardia that was shocked x1, C  found to have in-stent restenosis of pLAD and  of RCA with elevated RA and PA pressures and severely decreased EF 32%, s/p IABP  and admitted to CICU for management of cardiogenic shock.    NEURO  - No neuro deficits known, baseline AOx3  - Sedated on Propofol while intubated   - Following full commands post cardiac arrest    #Anxiety  - Hold Ativan, f/u psych recs  - Propofol while intubated     PULM  Intubated, airway protection   - Vent management, ABGs  - Pulm toileting     #COVID +  - Maintain Airborne precautions  - Defer to ID for remdesivir or steroids    #Asthma (hx of respiratory failure in 2018- intubated for 20 minutes per chart review pt report)  - c/w albuterol, symbicort and spiriva  - on trelegy at home    CV  #Vfib arrest insetting of ischemia vs. shock  - PAC insertion, obtain Hemos, +/- IABP and/or Inotropic support  - c/w Lido gtt at 2mg/hr, Amio IV load   - Keep K > 4, Mag > 2.2     #Cardiogenic shock requiring IABP (- )  - likely 2/2 NSTEMI and ADHF  -  LHC: pLAD 100 % in-stent restenosis & mRCA, 100 %. PCWP 30  -  TTE: LV dilated. EF 32 %. Regional WMAs present, mod (grade 2) LV diastolic dysfunction  -  TTE: EF 22% and + LV thrombus   - s/p lasix 80 this am, appears euvolemic on exam  - IABP removed   - Hydralazine increased to 75 from 40 overnight and c/w isordil for AL reduction  - nipride weaned off this am  - continue to monitor perfusion indices daily  - continue Strict I&O, goal net negative  - Daily weights  - Fluid restriction    #Stent re-occlusion of pLAD and 100%  of RCA  - EKG on admission w/ LBBB  - DAPT: c/w ASA, brillinta   - c/w lipitor 80  - CT sx not recommending CABG, undergoing AT eval  - Viability study when IABP removed    #LV thrombus  - c/w heparin gtt    #HTN  - afterload reduction as above    /RENAL  #ARIC  - sCr downtrending, Baseline Cr: 1-1.22  - diuresis as above  - Trend BMP, lytes daily, replace as needed  - continue Strict I/Os, avoid nephrotoxins    GI  #Transaminitis  - Likely iso cardiogenic shock  - Improving, continue to trend daily    #GERD  - c/w home protonix  - Tolerating diet  -      #Bowel regimen  - miralax and senna d/c'd iso loose stools    ENDO  #Type 2 DM  - A1c 8.3  - c/w lantus and ISS  - -160s    HEME  #VTE prophylaxis   - H/H stable  - c/w heparin gtt    ID  #Concern for aspiration event prior to intubation  - Right sided opacification on CXR , clear today   - Last fever  (100.6)  - Blood cultures  and  NGTD  - UA  negative  - MRSA swab negative  - Continue to monitor off ABx (s/p vanco and cefepime )  - ID following    #COVID  - Maintain airborne precautions    GOC  - Full code  ======================= DISPOSITION  =====================  [X] Critical   [ ] Guarded    [ ] Stable    [X] Maintain in CICU  [ ] Downgrade to Telemetry  [ ] Discharge Home    Patient requires continuous monitoring with bedside rhythm monitoring, pulse ox monitoring, and intermittent blood gas analysis. Care plan discussed with ICU care team. Patient remained critical and at risk for life threatening decompensation.  Patient seen, examined and plan discussed with CCU team during rounds.     I have personally provided 75 minutes of critical care time excluding time spent on separate procedures, in addition to initial critical care time provided by the CICU Attending, Dr. Nabeel Hawthorne [facility] [phone]

## 2023-11-09 NOTE — PROGRESS NOTE ADULT - PROBLEM SELECTOR PLAN 1
- S/p PMVT / VF arrest 11/8 after being downgraded   - S/p IABPx1 removed, now replacing to limit ectopy and dec myocardial work   - Inotrope: Low threshold but run risk of more ectopy   - Cont hydral 100/ISDN 30 TID   - Cont Aldactone 25mg daily  - DC losartan iso renal dysfunction   - Change lasix IV --> Bumex gtt at 2   - Trend perfusion markers daily, MVO2, BMP, Lactate and LFT's  - Strict I/O's.  - Please keep primary Dr. Banuelos [phone] in the loop per family request

## 2023-11-09 NOTE — PROGRESS NOTE ADULT - SUBJECTIVE AND OBJECTIVE BOX
Patient seen and examined at bedside.    Overnight Events: Pt downgraded to the floor yesterday, over night pt had VT--> VF arrest, shocked 3x, intubated, transiently on pressors, still having a lot of ectopy on amio/lido/propofol, Spoke to EP about recs on what to do, will re-place IABP today as arrest was likely ischemic (PMVT with R on T), if he can get extubated will talk to him about AT, spoke to wife at bedside and gave prelim info on AT eval she agrees pt needs to interact to decide what he would want to do. Also spoke to pt primary Dr. Banuelos [phone] who has cared for the pt for 20 years and would like to stay updated per family request. Will also start bumex gtt for vol overload based on RA 11 PA 54/27 CO 7.5 CI 3.4     Review of Systems: Negative except as per HPI     Current Meds:  albuterol/ipratropium for Nebulization 3 milliLiter(s) Nebulizer every 6 hours PRN  aMIOdarone Infusion 0.5 mG/Min IV Continuous <Continuous>  aspirin  chewable 81 milliGRAM(s) Oral daily  atorvastatin 80 milliGRAM(s) Oral at bedtime  budesonide  80 MICROgram(s)/formoterol 4.5 MICROgram(s) Inhaler 2 Puff(s) Inhalation two times a day  chlorhexidine 2% Cloths 1 Application(s) Topical daily  dextrose 50% Injectable 12.5 Gram(s) IV Push once  dextrose 50% Injectable 25 Gram(s) IV Push once  furosemide   Injectable 80 milliGRAM(s) IV Push daily  glucagon  Injectable 1 milliGRAM(s) IntraMuscular once  heparin  Infusion 1600 Unit(s)/Hr IV Continuous <Continuous>  insulin regular Infusion 3 Unit(s)/Hr IV Continuous <Continuous>  lidocaine   Infusion 2 mG/Min IV Continuous <Continuous>  pantoprazole  Injectable 40 milliGRAM(s) IV Push daily  propofol Infusion 20 MICROgram(s)/kG/Min IV Continuous <Continuous>  spironolactone 25 milliGRAM(s) Oral daily  ticagrelor 90 milliGRAM(s) Oral every 12 hours  tiotropium 2.5 MICROgram(s) Inhaler 2 Puff(s) Inhalation daily      Vitals:  T(F): 98.7 (11-09), Max: 99 (11-08)  HR: 81 (11-09) (70 - 126)  BP: 94/57 (11-09) (94/57 - 134/80)  RR: 18 (11-09)  SpO2: 95% (11-09)  I&O's Summary    08 Nov 2023 07:01  -  09 Nov 2023 07:00  --------------------------------------------------------  IN: 1651 mL / OUT: 1780 mL / NET: -129 mL    09 Nov 2023 07:01  -  09 Nov 2023 16:09  --------------------------------------------------------  IN: 610.4 mL / OUT: 770 mL / NET: -159.6 mL        Physical Exam:  Appearance: intubated and sedated but following basic commands   Eyes: PERRL, EOMI, pink conjunctiva  HEENT: Normal oral mucosa  Cardiovascular: RRR, S1, S2, no murmurs, rubs, or gallops; no edema; hard to assess for JVD, swan in place   Respiratory: Mechanical breath sounds   Gastrointestinal: soft, non-tender, non-distended with normal bowel sounds  Musculoskeletal: No clubbing; no joint deformity   Neurologic: Non-focal  Lymphatic: No lymphadenopathy  Psychiatry: Unable to assess  Skin: No rashes, ecchymoses, or cyanosis                          11.9   11.61 )-----------( 285      ( 09 Nov 2023 15:22 )             35.1     11-09    131<L>  |  98  |  47<H>  ----------------------------<  296<H>  3.8   |  16<L>  |  2.75<H>    Ca    9.0      09 Nov 2023 05:29  Phos  4.1     11-09  Mg     3.4     11-09    TPro  7.1  /  Alb  3.3  /  TBili  0.7  /  DBili  x   /  AST  186<H>  /  ALT  173<H>  /  AlkPhos  87  11-09    PT/INR - ( 09 Nov 2023 05:29 )   PT: 12.8 sec;   INR: 1.23 ratio    PTT - ( 09 Nov 2023 05:29 )  PTT:61.1 sec    CARDIAC MARKERS ( 03 Nov 2023 01:49 )  1226 ng/L / x     / x     / x     / x     / x

## 2023-11-09 NOTE — PROGRESS NOTE ADULT - SUBJECTIVE AND OBJECTIVE BOX
LD VALENCIA  MRN-[id number]  Patient is a 58y old  Male who presents with a chief complaint of Mix Cardiogenic Septic shock (2023 19:19)    HPI:  pt seen and approx 1:30 pm  in [facility]    57yo M w/ hx HTN, CAD w/ 1 stent in , ICH () presenting with abn ekg. Patient presented to [facility] where he was found to have STEMI, recommended to get cath however patient did not want to get it there so it left and came here.  Patient initially had cough, congestion, fever, was placed on antibiotics on .  Started feeling nauseous and had a presyncopal event after which he presented to ED last night.  Had chest pain as well.  Chest pain is midsternal.  Not currently having chest pain.  Received 4 aspirin 30 min pta. (2023 15:11)      Hospital Course:    24 HOUR EVENTS:    REVIEW OF SYSTEMS:    CONSTITUTIONAL: No weakness, fevers or chills  EYES/ENT: No visual changes;  No vertigo or throat pain   NECK: No pain or stiffness  RESPIRATORY: No cough, wheezing, hemoptysis; No shortness of breath  CARDIOVASCULAR: No chest pain or palpitations  GASTROINTESTINAL: No abdominal or epigastric pain. No nausea, vomiting, or hematemesis; No diarrhea or constipation. No melena or hematochezia.  GENITOURINARY: No dysuria, frequency or hematuria  NEUROLOGICAL: No numbness or weakness  SKIN: No itching, rashes      ICU Vital Signs Last 24 Hrs  T(C): 37.4 (2023 15:00), Max: 37.4 (2023 15:00)  T(F): 99.3 (2023 15:00), Max: 99.3 (2023 15:00)  HR: 91 (2023 18:00) (70 - 126)  BP: 94/57 (2023 01:00) (94/57 - 118/65)  BP(mean): 71 (2023 01:00) (71 - 83)  ABP: 97/63 (2023 18:00) (79/52 - 125/29)  ABP(mean): 82 (2023 18:00) (64 - 85)  RR: 20 (2023 18:00) (18 - 31)  SpO2: 92% (2023 18:00) (92% - 99%)    O2 Parameters below as of 2023 18:00  Patient On (Oxygen Delivery Method): ventilator    O2 Concentration (%): 30      Mode: AC/ CMV (Assist Control/ Continuous Mandatory Ventilation), RR (machine): 18, TV (machine): 500, FiO2: 30, PEEP: 5, ITime: 1  CVP(mm Hg): 15 (23 @ 18:00) (1 - 20)  CO: --  CI: --  PA: 51/29 (23 @ 18:00) (38/20 - 62/33)  PA(mean): 38 (23 @ 18:00) (28 - 45)  PA(direct): --  PCWP: --  LA: --  RA: --  SVR: --  SVRI: --  PVR: --  PVRI: --  I&O's Summary    2023 07:01  -  2023 07:00  --------------------------------------------------------  IN: 1651 mL / OUT: 1780 mL / NET: -129 mL    2023 07:01  -  2023 20:03  --------------------------------------------------------  IN: 733.4 mL / OUT: 920 mL / NET: -186.6 mL        CAPILLARY BLOOD GLUCOSE    CAPILLARY BLOOD GLUCOSE      POCT Blood Glucose.: 123 mg/dL (2023 19:52)      PHYSICAL EXAM:  GENERAL: No acute distress, well-developed  HEAD:  Atraumatic, Normocephalic  EYES: EOMI, PERRLA, conjunctiva and sclera clear  NECK: Supple, no lymphadenopathy, no JVD  CHEST/LUNG: CTAB; No wheezes, rales, or rhonchi  HEART: Regular rate and rhythm. Normal S1/S2. No murmurs, rubs, or gallops  ABDOMEN: Soft, non-tender, non-distended; normal bowel sounds, no organomegaly  EXTREMITIES:  2+ peripheral pulses b/l, No clubbing, cyanosis, or edema  NEUROLOGY: A&O x 3, no focal deficits  SKIN: No rashes or lesions    ============================I/O===========================   I&O's Detail    2023 07:01  -  2023 07:00  --------------------------------------------------------  IN:    Amiodarone: 16.7 mL    Amiodarone: 233.1 mL    Dexmedetomidine: 118.8 mL    Heparin: 289 mL    Insulin: 13 mL    Lidocaine: 105 mL    Nitroprusside: 7.4 mL    Oral Fluid: 720 mL    Propofol: 148 mL  Total IN: 1651 mL    OUT:    Indwelling Catheter - Urethral (mL): 1780 mL  Total OUT: 1780 mL    Total NET: -129 mL      2023 07:01  -  2023 20:03  --------------------------------------------------------  IN:    Amiodarone: 183.7 mL    Heparin: 187 mL    Insulin: 65 mL    Lidocaine: 165 mL    Propofol: 132.7 mL  Total IN: 733.4 mL    OUT:    Indwelling Catheter - Urethral (mL): 920 mL  Total OUT: 920 mL    Total NET: -186.6 mL        ============================ LABS =========================                        11.9   11.61 )-----------( 285      ( 2023 15:22 )             35.1         134<L>  |  101  |  50<H>  ----------------------------<  107<H>  3.8   |  15<L>  |  3.40<H>    Ca    8.9      2023 15:22  Phos  5.1       Mg     3.2         TPro  6.9  /  Alb  3.2<L>  /  TBili  0.6  /  DBili  x   /  AST  120<H>  /  ALT  145<H>  /  AlkPhos  76                  LIVER FUNCTIONS - ( 2023 15:22 )  Alb: 3.2 g/dL / Pro: 6.9 g/dL / ALK PHOS: 76 U/L / ALT: 145 U/L / AST: 120 U/L / GGT: x           PT/INR - ( 2023 05:29 )   PT: 12.8 sec;   INR: 1.23 ratio         PTT - ( 2023 05:29 )  PTT:61.1 sec  ABG - ( 2023 15:10 )  pH, Arterial: 7.41  pH, Blood: x     /  pCO2: 29    /  pO2: 123   / HCO3: 18    / Base Excess: -5.1  /  SaO2: 99.1              Blood Gas Arterial, Lactate: 1.1 mmol/L (23 @ 15:10)  Blood Gas Arterial, Lactate: 1.3 mmol/L (23 @ 05:23)  Blood Gas Arterial, Lactate: 1.7 mmol/L (23 @ 01:39)  Blood Gas Venous - Lactate: 1.8 mmol/L (23 @ 01:39)  Blood Gas Arterial, Lactate: 0.8 mmol/L (23 @ 01:01)  Blood Gas Arterial, Lactate: 0.7 mmol/L (23 @ 17:40)  Blood Gas Arterial, Lactate: 0.8 mmol/L (23 @ 12:50)  Blood Gas Venous - Lactate: 0.7 mmol/L (23 @ 00:37)  Blood Gas Arterial, Lactate: 0.7 mmol/L (23 @ 00:37)  Blood Gas Venous - Lactate: 0.8 mmol/L (23 @ 20:15)    Urinalysis Basic - ( 2023 15:22 )    Color: x / Appearance: x / SG: x / pH: x  Gluc: 107 mg/dL / Ketone: x  / Bili: x / Urobili: x   Blood: x / Protein: x / Nitrite: x   Leuk Esterase: x / RBC: x / WBC x   Sq Epi: x / Non Sq Epi: x / Bacteria: x      ======================Micro/Rad/Cardio=================  Telemtry: Reviewed   EKG: Reviewed  CXR: Reviewed  Culture: Reviewed   Echo:   Cath: Cardiac Cath Lab - Adult:   [facility]  Department of Cardiology  [address] [place] [address]  [phone]  Cath Lab Report -- Comprehensive Report  Patient: LD VALENCIA  Study date: 2017  Account number: [id number]  MR number: [id number]  : 1965  Gender: Male  Race: W  Case Physician(s):  Jairon Holguin M.D.  Referring Physician:  Luc Lynn M.D.  INDICATIONS: Unstable angina - CCS4.  HISTORY: The patient has a history of coronary artery disease. The patient  hashypertension and medication-treated dyslipidemia.  PROCEDURE:  --  Left heart catheterization with ventriculography.  --  Left coronary angiography.  --  Right coronary angiography.  TECHNIQUE: The risks and alternatives of the procedures and conscious  sedation were explained to the patient and informed consent was obtained.  Cardiac catheterization performed electively.  Local anesthetic given. Right radial artery access. A 6FR PRELUDE KIT was  inserted in the vessel. Left heart catheterization. Ventriculography was  performed. A 5FR FR4.0 EXPO catheter was utilized. Left coronary artery  angiography. The vessel was injected utilizing a 5FR FL3.5 EXPO catheter.  Right coronary artery angiography. The vessel was injected utilizing a 5FR  FR4.0 EXPO catheter. RADIATION EXPOSURE: 1.1 min.  CONTRAST GIVEN: Omnipaque 55 ml.  MEDICATIONS GIVEN: Midazolam, 1 mg, IV. Fentanyl, 25 mcg, IV. Verapamil  (Isoptin, Calan, Covera), 2.5 mg, IA. Heparin, 3000 units, IA.  VENTRICLES: Global left ventricular function was moderately depressed. EF  estimated was 40 %.  CORONARY VESSELS: The coronary circulation is right dominant.  LM:   --  LM: Normal.  LAD:   --  Proximal LAD: There was a 50 % stenosis.  CX:   --  Circumflex: Normal.  RCA:   --  Mid RCA: There was a 40 % stenosis.  --  Distal RCA: There was a 50 % stenosis.  COMPLICATIONS: There were no complications.  DIAGNOSTIC RECOMMENDATIONS: The patient should continue with the present  medications.  Prepared and signed by  Jairon Holguin M.D.  Signed 2017 12:20:13  HEMODYNAMIC TABLES  Pressures:  Baseline/ Room Air  Pressures:  - HR: 78  Pressures:  - Rhythm:  Pressures:  -- Aortic Pressure (S/D/M): --/--/99  Pressures:  -- Left Ventricle (s/edp): 157/39/--  Outputs:  Baseline/ Room Air  Outputs:  -- CALCULATIONS: Age in years: 51.41  Outputs:  -- CALCULATIONS: Body Surface Area: 2.05  Outputs:  -- CALCULATIONS: Height in cm: 175.00  Outputs:  -- CALCULATIONS: Sex: Male  Outputs:  -- CALCULATIONS: Weight in k.40 (17 @ 21:55)    ======================================================  PAST MEDICAL & SURGICAL HISTORY:  HTN (hypertension)      CAD (coronary artery disease)  ; stent      Intracranial hemorrhage        Respiratory arrest        Myocardial infarction, unspecified MI type, unspecified artery      History of coronary artery stent placement  ====================ASSESSMENT ==============  59 yo M with HTN, CAD (s/p PCI ), HFrEF, CVA , and T2DM presenting with chest pressure and unknown tachycardia that was shocked x1, [facility]  found to have in-stent restenosis of pLAD and  of RCA with elevated RA and PA pressures and severely decreased EF 32%, admitted to CICU for management of cardiogenic shock requiring IABP  -, with hospital course c/b V.fib arrest, transferred to CICU.       NEURO  - No neuro deficits known, baseline AOx3  - Sedated on Propofol while intubated, calm and appropriate, following simple commands/moving all extremities  - continue propofol  - further sedation awakening when more hemodynamically stable    #Anxiety  - Hold Ativan, f/u psych recs  - continue Propofol while intubated   - seroqual d/c s/t     PULM  #Intubated post arrest  - AC 18/300/ 5/30  - ABG appropriate, continue current settings  - vent weaning when hemodynamically stable    #COVID +  - Maintain Airborne precautions  #Asthma (hx of respiratory failure in 2018- intubated for 20 minutes per chart review pt report)  - c/w albuterol, symbicort and spiriva  - on trelegy at home    CV  #Vfib arrest insetting of ischemia vs. shock  - PAC insertion, mv02 69.5%, CI 3.1,   - c/w Lido gtt at 2mg/hr, continue Amio @.5  - pending cMRI  - Keep K > 4, Mag > 2.2     #Cardiogenic shock requiring IABP (- )  - likely 2/2 NSTEMI and ADHF  -  LHC: pLAD 100 % in-stent restenosis & mRCA, 100 %. PCWP 30  -  TTE: LV dilated. EF 32 %. Regional WMAs present, mod (grade 2) LV diastolic dysfunction  -  TTE: EF 22% and + LV thrombus   - s/p lasix 80 this am, appears euvolemic on exam, flat CVP 13, continue diuresis  - IABP removed   - Hydralazine 75 and isordil for AL reduction, hold as necessary while BP softer on propofol  - continue romel 25 daily  - continue to monitor perfusion indices   - continue Strict I&O, goal net negative  - Daily weights    #Stent re-occlusion of pLAD and 100%  of RCA  - EKG on admission w/ LBBB  - DAPT: c/w ASA, brillinta   - c/w lipitor 80  - CT sx not recommending CABG, undergoing AT eval  - pending cardiac MRI for viability    #LV thrombus  - c/w heparin gtt    #HTN  - afterload reduction as above    /RENAL  #non-oliguric ARIC  - sCr uptrending likely i/s/o post arrest, Baseline Cr: 1-1.22  - diuresis as above  - Trend BMP, lytes daily, replace as needed  - continue Strict I/Os, avoid nephrotoxins    GI  #Transaminitis  - Likely worsening i/s/o cardiogenic shock  - continue to trend daily    - NPO while intubated  - NGT for meds    #GERD  - c/w home protonix    #Bowel regimen  - miralax and senna d/c'd iso loose stools    ENDO  #Type 2 DM  - A1c 8.3, sugars uncontrolled on admission likely stress induced s/p cardiac arrest  - given 8 units lantus overnight, continue insulin gtt    HEME  - H/H and platelets stable  - continue to trend daily  #VTE prophylaxis   - c/w heparin gtt    ID  - leukocytosis likely s/t cardiac arrest  - afebrile, continue to monitor off abx  - concern for spiration event prior to intubation on admission - s/p vanco and cefepime )    #COVID  - Maintain airborne precautions    GOC  - Full code  ======================= DISPOSITION  =====================  [X] Critical   [ ] Guarded    [ ] Stable    [X] Maintain in CICU  [ ] Downgrade to Telemetry  [ ] Discharge Home      Patient requires continuous monitoring with bedside rhythm monitoring, pulse ox monitoring, and intermittent blood gas analysis. Care plan discussed with ICU care team. Patient remained critical and at risk for life threatening decompensation.  Patient seen, examined and plan discussed with CCU team during rounds.     I have personally provided ____ minutes of critical care time excluding time spent on separate procedures, in addition to initial critical care time provided by the CICU Attending, Dr. Lees.    By signing my name below, I, Kalyn Sousa, attest that this documentation has been prepared under the direction and in the presence of KARYN Lucas.  Electronically signed: Sangita Booth, 23 @ 20:04    I, Adelso Toribio , personally performed the services described in this documentation. all medical record entries made by the sangita were at my direction and in my presence. I have reviewed the chart and agree that the record reflects my personal performance and is accurate and complete  Electronically signed: KARYN Lucas.       LD VALENCIA  MRN-[id number]  Patient is a 58y old  Male who presents with a chief complaint of Mix Cardiogenic Septic shock (2023 19:19)    HPI:  pt seen and approx 1:30 pm  in [facility]    57yo M w/ hx HTN, CAD w/ 1 stent in , ICH () presenting with abn ekg. Patient presented to [facility] where he was found to have STEMI, recommended to get cath however patient did not want to get it there so it left and came here.  Patient initially had cough, congestion, fever, was placed on antibiotics on .  Started feeling nauseous and had a presyncopal event after which he presented to ED last night.  Had chest pain as well.  Chest pain is midsternal.  Not currently having chest pain.  Received 4 aspirin 30 min pta. (2023 15:11)    24 HOUR EVENTS: Pt remains intubated with plan for SBT in AM    REVIEW OF SYSTEMS:    CONSTITUTIONAL: No weakness, fevers or chills  EYES/ENT: No visual changes;  No vertigo or throat pain   NECK: No pain or stiffness  RESPIRATORY: No cough, wheezing, hemoptysis; No shortness of breath  CARDIOVASCULAR: No chest pain or palpitations  GASTROINTESTINAL: No abdominal or epigastric pain. No nausea, vomiting, or hematemesis; No diarrhea or constipation. No melena or hematochezia.  GENITOURINARY: No dysuria, frequency or hematuria  NEUROLOGICAL: No numbness or weakness  SKIN: No itching, rashes      ICU Vital Signs Last 24 Hrs  T(C): 37.4 (2023 15:00), Max: 37.4 (2023 15:00)  T(F): 99.3 (2023 15:00), Max: 99.3 (2023 15:00)  HR: 91 (2023 18:00) (70 - 126)  BP: 94/57 (2023 01:00) (94/57 - 118/65)  BP(mean): 71 (2023 01:00) (71 - 83)  ABP: 97/63 (2023 18:00) (79/52 - 125/29)  ABP(mean): 82 (2023 18:00) (64 - 85)  RR: 20 (2023 18:00) (18 - 31)  SpO2: 92% (2023 18:00) (92% - 99%)    O2 Parameters below as of 2023 18:00  Patient On (Oxygen Delivery Method): ventilator    O2 Concentration (%): 30      Mode: AC/ CMV (Assist Control/ Continuous Mandatory Ventilation), RR (machine): 18, TV (machine): 500, FiO2: 30, PEEP: 5, ITime: 1  CVP(mm Hg): 15 (23 @ 18:00) (1 - 20)  CO: --  CI: --  PA: 51/29 (23 @ 18:00) (38/20 - 62/33)  PA(mean): 38 (23 @ 18:00) (28 - 45)  PA(direct): --  PCWP: --  LA: --  RA: --  SVR: --  SVRI: --  PVR: --  PVRI: --  I&O's Summary    2023 07:01  -  2023 07:00  --------------------------------------------------------  IN: 1651 mL / OUT: 1780 mL / NET: -129 mL    2023 07:01  -  2023 20:03  --------------------------------------------------------  IN: 733.4 mL / OUT: 920 mL / NET: -186.6 mL        CAPILLARY BLOOD GLUCOSE    CAPILLARY BLOOD GLUCOSE      POCT Blood Glucose.: 123 mg/dL (2023 19:52)    ============================I/O===========================   I&O's Detail    2023 07: 07:00  --------------------------------------------------------  IN:    Amiodarone: 16.7 mL    Amiodarone: 233.1 mL    Dexmedetomidine: 118.8 mL    Heparin: 289 mL    Insulin: 13 mL    Lidocaine: 105 mL    Nitroprusside: 7.4 mL    Oral Fluid: 720 mL    Propofol: 148 mL  Total IN: 1651 mL    OUT:    Indwelling Catheter - Urethral (mL): 1780 mL  Total OUT: 1780 mL    Total NET: -129 mL      2023 07:  -  2023 20:03  --------------------------------------------------------  IN:    Amiodarone: 183.7 mL    Heparin: 187 mL    Insulin: 65 mL    Lidocaine: 165 mL    Propofol: 132.7 mL  Total IN: 733.4 mL    OUT:    Indwelling Catheter - Urethral (mL): 920 mL  Total OUT: 920 mL    Total NET: -186.6 mL        ============================ LABS =========================                        11.9   11.61 )-----------( 285      ( 2023 15:22 )             35.1         134<L>  |  101  |  50<H>  ----------------------------<  107<H>  3.8   |  15<L>  |  3.40<H>    Ca    8.9      2023 15:22  Phos  5.1       Mg     3.2         TPro  6.9  /  Alb  3.2<L>  /  TBili  0.6  /  DBili  x   /  AST  120<H>  /  ALT  145<H>  /  AlkPhos  76  11-09                LIVER FUNCTIONS - ( 2023 15:22 )  Alb: 3.2 g/dL / Pro: 6.9 g/dL / ALK PHOS: 76 U/L / ALT: 145 U/L / AST: 120 U/L / GGT: x           PT/INR - ( 2023 05:29 )   PT: 12.8 sec;   INR: 1.23 ratio         PTT - ( 2023 05:29 )  PTT:61.1 sec  ABG - ( 2023 15:10 )  pH, Arterial: 7.41  pH, Blood: x     /  pCO2: 29    /  pO2: 123   / HCO3: 18    / Base Excess: -5.1  /  SaO2: 99.1              Blood Gas Arterial, Lactate: 1.1 mmol/L (23 @ 15:10)  Blood Gas Arterial, Lactate: 1.3 mmol/L (23 @ 05:23)  Blood Gas Arterial, Lactate: 1.7 mmol/L (23 @ 01:39)  Blood Gas Venous - Lactate: 1.8 mmol/L (23 @ 01:39)  Blood Gas Arterial, Lactate: 0.8 mmol/L (23 @ 01:01)  Blood Gas Arterial, Lactate: 0.7 mmol/L (23 @ 17:40)  Blood Gas Arterial, Lactate: 0.8 mmol/L (23 @ 12:50)  Blood Gas Venous - Lactate: 0.7 mmol/L (23 @ 00:37)  Blood Gas Arterial, Lactate: 0.7 mmol/L (23 @ 00:37)  Blood Gas Venous - Lactate: 0.8 mmol/L (23 @ 20:15)    Urinalysis Basic - ( 2023 15:22 )    Color: x / Appearance: x / SG: x / pH: x  Gluc: 107 mg/dL / Ketone: x  / Bili: x / Urobili: x   Blood: x / Protein: x / Nitrite: x   Leuk Esterase: x / RBC: x / WBC x   Sq Epi: x / Non Sq Epi: x / Bacteria: x      ======================Micro/Rad/Cardio=================  Telemtry: Reviewed   EKG: Reviewed  CXR: Reviewed  Culture: Reviewed   Echo:   Cath: Cardiac Cath Lab - Adult:   [facility]  Department of Cardiology  [address] [place] [address] [phone]  Cath Lab Report -- Comprehensive Report  Patient: LD VALENCIA  Study date: 2017  Account number: [id number]  MR number: [id number]  : 1965  Gender: Male  Race: W  Case Physician(s):  Jairon Holguin M.D.  Referring Physician:  Luc Lynn M.D.  INDICATIONS: Unstable angina - CCS4.  HISTORY: The patient has a history of coronary artery disease. The patient  hashypertension and medication-treated dyslipidemia.  PROCEDURE:  --  Left heart catheterization with ventriculography.  --  Left coronary angiography.  --  Right coronary angiography.  TECHNIQUE: The risks and alternatives of the procedures and conscious  sedation were explained to the patient and informed consent was obtained.  Cardiac catheterization performed electively.  Local anesthetic given. Right radial artery access. A 6FR PRELUDE KIT was  inserted in the vessel. Left heart catheterization. Ventriculography was  performed. A 5FR FR4.0 EXPO catheter was utilized. Left coronary artery  angiography. The vessel was injected utilizing a 5FR FL3.5 EXPO catheter.  Right coronary artery angiography. The vessel was injected utilizing a 5FR  FR4.0 EXPO catheter. RADIATION EXPOSURE: 1.1 min.  CONTRAST GIVEN: Omnipaque 55 ml.  MEDICATIONS GIVEN: Midazolam, 1 mg, IV. Fentanyl, 25 mcg, IV. Verapamil  (Isoptin, Calan, Covera), 2.5 mg, IA. Heparin, 3000 units, IA.  VENTRICLES: Global left ventricular function was moderately depressed. EF  estimated was 40 %.  CORONARY VESSELS: The coronary circulation is right dominant.  LM:   --  LM: Normal.  LAD:   --  Proximal LAD: There was a 50 % stenosis.  CX:   --  Circumflex: Normal.  RCA:   --  Mid RCA: There was a 40 % stenosis.  --  Distal RCA: There was a 50 % stenosis.  COMPLICATIONS: There were no complications.  DIAGNOSTIC RECOMMENDATIONS: The patient should continue with the present  medications.  Prepared and signed by  Jairon Holguin M.D.  Signed 2017 12:20:13  HEMODYNAMIC TABLES  Pressures:  Baseline/ Room Air  Pressures:  - HR: 78  Pressures:  - Rhythm:  Pressures:  -- Aortic Pressure (S/D/M): --/--/99  Pressures:  -- Left Ventricle (s/edp): 157/39/--  Outputs:  Baseline/ Room Air  Outputs:  -- CALCULATIONS[age]  Outputs:  -- CALCULATIONS: Body Surface Area: 2.05  Outputs:  -- CALCULATIONS: Height in cm: 175.00  Outputs:  -- CALCULATIONS: Sex: Male  Outputs:  -- CALCULATIONS: Weight in k.40 (17 @ 21:55)    ======================================================  PAST MEDICAL & SURGICAL HISTORY:  HTN (hypertension)      CAD (coronary artery disease)  ; stent      Intracranial hemorrhage        Respiratory arrest        Myocardial infarction, unspecified MI type, unspecified artery      History of coronary artery stent placement  ====================ASSESSMENT ==============  57 yo M with HTN, CAD (s/p PCI ), HFrEF, CVA , and T2DM presenting with chest pressure and unknown tachycardia that was shocked x1, [facility]  found to have in-stent restenosis of pLAD and  of RCA with elevated RA and PA pressures and severely decreased EF 32%, admitted to CICU for management of cardiogenic shock requiring IABP  -, with hospital course c/b V.fib arrest, transferred to CICU.       NEURO  - No neuro deficits known, baseline AOx3  - Sedated on Propofol while intubated, calm and appropriate, following simple commands/moving all extremities  - continue propofol  - further sedation awakening when more hemodynamically stable    #Anxiety  - Hold Ativan, f/u psych recs  - continue Propofol while intubated   - seroqual d/c s/t     PULM  #Intubated post arrest  - AC 18/300/ 5/30  - ABG appropriate, continue current settings  - SBT in AM for poss extubation    #COVID +  - Maintain Airborne precautions  #Asthma (hx of respiratory failure in 2018- intubated for 20 minutes per chart review pt report)  - c/w albuterol, symbicort and spiriva  - on trelegy at home    CV  #Vfib arrest insetting of ischemia vs. shock  - PAC insertion, mv02 69.5%, CI 3.1,   - c/w Lido gtt at 2mg/hr, continue Amio @.5  - pending cMRI  - Keep K > 4, Mag > 2.2     #Cardiogenic shock requiring IABP (- )  - likely 2/2 NSTEMI and ADHF  -  LHC: pLAD 100 % in-stent restenosis & mRCA, 100 %. PCWP 30  -  TTE: LV dilated. EF 32 %. Regional WMAs present, mod (grade 2) LV diastolic dysfunction  -  TTE: EF 22% and + LV thrombus   - s/p lasix 80 this am, appears euvolemic on exam, flat CVP 13, continue diuresis  - IABP removed   - Hydralazine 75 and isordil for AL reduction, hold as necessary while BP softer on propofol  - continue romel 25 daily  - continue to monitor perfusion indices   - continue Strict I&O, goal net negative  - Daily weights    #Stent re-occlusion of pLAD and 100%  of RCA  - EKG on admission w/ LBBB  - DAPT: c/w ASA, brillinta   - c/w lipitor 80  - CT sx not recommending CABG, undergoing AT eval  - pending cardiac MRI for viability    #LV thrombus  - c/w heparin gtt    #HTN  - afterload reduction as above    /RENAL  #non-oliguric ARIC  - sCr uptrending likely i/s/o post arrest, Baseline Cr: 1-1.22  - diuresis as above  - Trend BMP, lytes daily, replace as needed  - continue Strict I/Os, avoid nephrotoxins    GI  #Transaminitis  - Likely worsening i/s/o cardiogenic shock  - continue to trend daily    - NPO while intubated  - NGT for meds    #GERD  - c/w home protonix    #Bowel regimen  - miralax and senna d/c'd iso loose stools    ENDO  #Type 2 DM  - A1c 8.3, sugars uncontrolled on admission likely stress induced s/p cardiac arrest  - given 8 units lantus overnight, continue insulin gtt    HEME  - H/H and platelets stable  - continue to trend daily  #VTE prophylaxis   - c/w heparin gtt    ID  - leukocytosis likely s/t cardiac arrest  - afebrile, continue to monitor off abx  - concern for spiration event prior to intubation on admission - s/p vanco and cefepime )    #COVID  - Maintain airborne precautions    GOC  - Full code  ======================= DISPOSITION  =====================  [X] Critical   [ ] Guarded    [ ] Stable    [X] Maintain in CICU  [ ] Downgrade to Telemetry  [ ] Discharge Home      Patient requires continuous monitoring with bedside rhythm monitoring, pulse ox monitoring, and intermittent blood gas analysis. Care plan discussed with ICU care team. Patient remained critical and at risk for life threatening decompensation.  Patient seen, examined and plan discussed with CCU team during rounds.     I have personally provided 35 minutes of critical care time excluding time spent on separate procedures, in addition to initial critical care time provided by the CICU Attending, Dr. Lees.    By signing my name below, I, Kalyn Sousa, attest that this documentation has been prepared under the direction and in the presence of KARYN Lucas.  Electronically signed: Sangita Booth, 23 @ 20:04    I, Adelso Toribio , personally performed the services described in this documentation. all medical record entries made by the sangita were at my direction and in my presence. I have reviewed the chart and agree that the record reflects my personal performance and is accurate and complete  Electronically signed: KARYN Lucas.

## 2023-11-09 NOTE — PROGRESS NOTE ADULT - ASSESSMENT
59 yo male h/o htn, cad s/p pci, ICH, here with NSTEMI  s/p intubation and cath. now in CCU    NSTEMI  s/p  cath  cath results noted. multi vessel dz., CTSx f/u.   hep gtt  IABP now removed   pt with vtach/fib arrest again last night. s/p ACLS and ROSC. now intubated. pending IABP  discussion regarding transplant ongoing    LV thrombus   hep gtt    acute on chronic systolic heart failure  heart failure team f/u      pna  recently diagnosed outpt  iv abx now stopped  f/u cult   id following     covid  supportive care    h/o ICH  resolved    agitation  psy consult f/u    mngt as per CCU team  d/w CCU attn    d/w pts pmd        Advanced care planning was discussed with patient and family.  Advanced care planning forms were reviewed and discussed as appropriate.  Differential diagnosis and plan of care discussed with patient after the evaluation.   Pain assessed and judicious use of narcotics when appropriate was discussed.  Importance of Fall prevention discussed.  Counseling on Smoking and Alcohol cessation was offered when appropriate.  Counseling on Diet, exercise, and medication compliance was done.       Approx 75 minutes spent.

## 2023-11-09 NOTE — PROGRESS NOTE ADULT - PROBLEM SELECTOR PLAN 2
- PMVT into VF arrest 11/8, 3 rounds with shocks  - Currently on amio/lido/prop  - EP consulted for recs on management of NSVT/PMVT  - Will place IABP to limit myocardial work as above  - Will need 2ndary prev ICD prior to DC regardless

## 2023-11-09 NOTE — PROGRESS NOTE ADULT - PROBLEM SELECTOR PLAN 3
- pLAD 70 % stenosis in the ostium portion of the segment and 100 % in-stent restenosis. mRCA, 100 % stenosis.   - Currently ASA, Atorvastatin 80mg and Brilinta  - Pending viability study, but poor coronary perfusion even when LAD was patent. Will need to be 7-10 days post MI to prevent FP with LGE (can light up if there is edema and be mistaken for scar)  - Pt may not need viability as unlikely to  if he is still having ischemic induced arrythmias. Also pt high risk for decompensation as he is still having ectopy on Amio/Lido/Propofol so will reach out to EP for possible ablation and discuss risk benefit of PCI

## 2023-11-09 NOTE — CHART NOTE - NSCHARTNOTEFT_GEN_A_CORE
Nutrition Follow Up Note  Patient seen for: follow up  Chart reviewed, events noted    Per chart, patient is a 57 y/o male with PMH including HTN, CAD (s/p PCI 2008), HFrEF (EF severely reduced 2018) not on GDMT, h/o CVA 2018 (requiring TPA), DM. Patient presents to [facility] for SOB x1 week. Intubated on 11/1 for respiratory failure. Extubated to nasal cannula 11/3. Found to be COVID-19 positive w/ persistent fevers. S/p IABP for hemodynamic support in setting of possible sepsis. S/p VFib arrest requiring multiple shocks, ROSC ~10 minutes on 11/8, transfers back to CICU.    Source: [] Patient       [x] EMR        [x] RN        [] Family at bedside       [] Other:  - If unable to interview patient: [x] Trach/Vent/BiPAP  [] Disoriented/confused/inappropriate to interview    Diet Order:   Diet, NPO:      Special Instructions for Nursing:  Except medications (11-09-23)    Is current order appropriate/adequate? [x] Yes: patient currently intubated after VFib arrest yesterday    Nutrition-related concerns:  - per d/w RN patient remains intubated, not currently w/ enteral access, team planning to potentially extubate patient later this afternoon/evening  - recurrent hyponatremia: management per team  - prior hyperphosphatemia AM of today, WNL on repeat blood draw noted  - insulin gtt noted  - sedation w/ propofol (current rate would give ~312kcal/day from propofol infusion)  - patient w/ elevated triglyceride level of 344 on 11/3, as patient is on propofol again, request updated level with next labs to assess if level is elevated and/or in excess of >400  - lasix and aldactone noted    GI:  Last BM: 11/8  Bowel Regimen? [x] No    Weights:   11/3: 97.6kg  - request updated weight of patient when medically appropriate    Nutritionally Pertinent MEDICATIONS  (STANDING):  aMIOdarone Infusion  atorvastatin  dextrose 50% Injectable  dextrose 50% Injectable  furosemide   Injectable  glucagon  Injectable  insulin regular Infusion  lidocaine   Infusion  pantoprazole  Injectable  spironolactone    Pertinent Labs: 11-09 @ 05:29: Na 131<L>, BUN 47<H>, Cr 2.75<H>, <H>, K+ 3.8, Phos 4.1, Mg 3.4<H>, Alk Phos 87, ALT/SGPT 173<H>, AST/SGOT 186<H>, HbA1c --  11-09 @ 01:44: Na 131<L>, BUN 44<H>, Cr 2.37<H>, <H>, K+ 3.8, Phos 5.6<H>, Mg 3.3<H>, Alk Phos 96, ALT/SGPT 191<H>, AST/SGOT 208<H>, HbA1c --    A1C with Estimated Average Glucose Result: 8.3 % (11-01-23 @ 06:14)    Finger Sticks:  POCT Blood Glucose.: 122 mg/dL (11-09 @ 15:02)  POCT Blood Glucose.: 128 mg/dL (11-09 @ 14:07)  POCT Blood Glucose.: 190 mg/dL (11-09 @ 12:03)  POCT Blood Glucose.: 202 mg/dL (11-09 @ 11:12)  POCT Blood Glucose.: 244 mg/dL (11-09 @ 10:05)  POCT Blood Glucose.: 277 mg/dL (11-09 @ 09:07)  POCT Blood Glucose.: 312 mg/dL (11-09 @ 08:02)  POCT Blood Glucose.: 274 mg/dL (11-09 @ 06:55)  POCT Blood Glucose.: 277 mg/dL (11-09 @ 06:00)  POCT Blood Glucose.: 299 mg/dL (11-09 @ 05:09)  POCT Blood Glucose.: 330 mg/dL (11-09 @ 02:55)  POCT Blood Glucose.: 349 mg/dL (11-09 @ 02:06)  POCT Blood Glucose.: 209 mg/dL (11-08 @ 23:21)  POCT Blood Glucose.: 134 mg/dL (11-08 @ 22:09)  POCT Blood Glucose.: 133 mg/dL (11-08 @ 18:13)    Skin per nursing documentation: no pressure injuries per documentation  Edema: none per documentation    Estimated Needs:   [x] recalculated:   Estimated Caloric Needs: 2147-2635kcal/day (22-27kcal/kg)  Estimated Protein Needs: 105-126g/pro/day (1.5-1.8g/pro/kg)  Estimated Fluid Needs: defer to team  [place] Equation (2003b): 2127kcal (11/9)  based on patient's most recent BW 97.6kg for caloric needs, IBW 70kg for protein needs w/ consideration for acute illness, intubation, BMI >30    Previous Nutrition Diagnosis: increased nutrient needs  Nutrition Diagnosis is: [x] ongoing in setting of acute illness, intubation     New Nutrition Diagnosis: Inadequate energy intake in setting of impaired ability to consume sufficient calories/protein 2/2 acute illness as evidenced by patient newly reintubated, currently without enteral access    Nutrition Care Plan:  [x] In Progress  [] Achieved  [] Not applicable    Nutrition Interventions:     Education Provided:       [] Yes:  [x] No:     Recommendations:      1. if patient unable to be extubated and enteral access is obtained for TFs, recommend TF regimen of:      - continuous TFs of Glucerna 1.5 @ 10cc/hr, increasing by 10cc/hr Q6H as tolerated until goal rate of 55cc/hr x24 hours (1320cc total volume)      - combined regimen (if propofol is maintained at current rate) would provide 2292kcal, 109g protein, 1002cc free H2O daily (23kcal/kg based on most recent BW 97.6kg 11/3, 1.6g/pro/kg based on IBW 70kg)  2. if patient remains intubated and continues on propofol, request updated triglyceride level with next labs (last value on 11/3 noted to be 344)  3. if patient is able to be extubated, defer diet advancement to team as tolerated  4. monitor progression of nutrition status pending potential extubation, weight trend, electrolytes, blood glucose levels, labs, BMs    Monitoring and Evaluation:   Continue to monitor nutritional intake, tolerance to diet prescription, weights, labs, skin integrity    RD remains available upon request and will follow up per protocol  Antelmo Escobar, BRENNAN, CDN - contact on TEAMS.

## 2023-11-09 NOTE — PROGRESS NOTE ADULT - SUBJECTIVE AND OBJECTIVE BOX
PATIENT:  LD VALENCIA  [id number]    CHIEF COMPLAINT:  Patient is a 58y old  Male who presents with a chief complaint of Mix Cardiogenic Septic shock (08 Nov 2023 19:19)      INTERVAL HISTORYOVERNIGHT EVENTS:      REVIEW OF SYSTEMS:    Constitutional:     [ ] negative [ ] fevers [ ] chills [ ] weight loss [ ] weight gain  HEENT:                  [ ] negative [ ] dry eyes [ ] eye irritation [ ] postnasal drip [ ] nasal congestion  CV:                         [ ] negative  [ ] chest pain [ ] orthopnea [ ] palpitations [ ] murmur  Resp:                     [ ] negative [ ] cough [ ] shortness of breath [ ] dyspnea [ ] wheezing [ ] sputum [ ] hemoptysis  GI:                          [ ] negative [ ] nausea [ ] vomiting [ ] diarrhea [ ] constipation [ ] abd pain [ ] dysphagia   :                        [ ] negative [ ] dysuria [ ] nocturia [ ] hematuria [ ] increased urinary frequency  Musculoskeletal: [ ] negative [ ] back pain [ ] myalgias [ ] arthralgias [ ] fracture  Skin:                       [ ] negative [ ] rash [ ] itch  Neurological:        [ ] negative [ ] headache [ ] dizziness [ ] syncope [ ] weakness [ ] numbness  Psychiatric:           [ ] negative [ ] anxiety [ ] depression  Endocrine:            [ ] negative [ ] diabetes [ ] thyroid problem  Heme/Lymph:      [ ] negative [ ] anemia [ ] bleeding problem  Allergic/Immune: [ ] negative [ ] itchy eyes [ ] nasal discharge [ ] hives [ ] angioedema    [ ] All other systems negative  [ ] Unable to assess ROS because ________.    MEDICATIONS:  MEDICATIONS  (STANDING):  albuterol    0.083% 2.5 milliGRAM(s) Nebulizer daily  aMIOdarone Infusion 0.5 mG/Min (16.7 mL/Hr) IV Continuous <Continuous>  aspirin  chewable 81 milliGRAM(s) Oral daily  atorvastatin 80 milliGRAM(s) Oral at bedtime  budesonide  80 MICROgram(s)/formoterol 4.5 MICROgram(s) Inhaler 2 Puff(s) Inhalation two times a day  chlorhexidine 2% Cloths 1 Application(s) Topical daily  dextrose 50% Injectable 25 Gram(s) IV Push once  dextrose 50% Injectable 12.5 Gram(s) IV Push once  furosemide   Injectable 80 milliGRAM(s) IV Push daily  glucagon  Injectable 1 milliGRAM(s) IntraMuscular once  heparin  Infusion 1600 Unit(s)/Hr (17 mL/Hr) IV Continuous <Continuous>  hydrALAZINE 100 milliGRAM(s) Oral every 8 hours  insulin regular Infusion 3 Unit(s)/Hr (3 mL/Hr) IV Continuous <Continuous>  isosorbide   dinitrate Tablet (ISORDIL) 30 milliGRAM(s) Oral three times a day  lidocaine   Infusion 2 mG/Min (15 mL/Hr) IV Continuous <Continuous>  pantoprazole  Injectable 40 milliGRAM(s) IV Push daily  propofol Infusion 20 MICROgram(s)/kG/Min (11.9 mL/Hr) IV Continuous <Continuous>  QUEtiapine 25 milliGRAM(s) Oral daily  spironolactone 25 milliGRAM(s) Oral daily  ticagrelor 90 milliGRAM(s) Oral every 12 hours  tiotropium 2.5 MICROgram(s) Inhaler 2 Puff(s) Inhalation daily    MEDICATIONS  (PRN):      ALLERGIES:  Allergies    penicillins (Unknown)    Intolerances        OBJECTIVE:  ICU Vital Signs Last 24 Hrs  T(C): 37.2 (08 Nov 2023 22:02), Max: 37.2 (08 Nov 2023 19:00)  T(F): 99 (08 Nov 2023 22:02), Max: 99 (08 Nov 2023 19:00)  HR: 94 (09 Nov 2023 06:00) (68 - 126)  BP: 94/57 (09 Nov 2023 01:00) (94/57 - 134/80)  BP(mean): 71 (09 Nov 2023 01:00) (71 - 100)  ABP: 99/52 (09 Nov 2023 06:00) (91/55 - 145/69)  ABP(mean): 73 (09 Nov 2023 06:00) (64 - 95)  RR: 22 (09 Nov 2023 06:00) (17 - 26)  SpO2: 95% (09 Nov 2023 06:00) (90% - 99%)    O2 Parameters below as of 09 Nov 2023 03:00  Patient On (Oxygen Delivery Method): ventilator          Mode: AC/ CMV (Assist Control/ Continuous Mandatory Ventilation)  RR (machine): 18  TV (machine): 500  FiO2: 50  PEEP: 6  MAP: 20  PIP: 24    Adult Advanced Hemodynamics Last 24 Hrs  CVP(mm Hg): 10 (09 Nov 2023 06:00) (1 - 12)  CVP(cm H2O): --  CO: --  CI: --  PA: --  PA(mean): --  PCWP: --  SVR: --  SVRI: --  PVR: --  PVRI: --  CAPILLARY BLOOD GLUCOSE      POCT Blood Glucose.: 277 mg/dL (09 Nov 2023 06:00)  POCT Blood Glucose.: 299 mg/dL (09 Nov 2023 05:09)  POCT Blood Glucose.: 330 mg/dL (09 Nov 2023 02:55)  POCT Blood Glucose.: 349 mg/dL (09 Nov 2023 02:06)  POCT Blood Glucose.: 209 mg/dL (08 Nov 2023 23:21)  POCT Blood Glucose.: 134 mg/dL (08 Nov 2023 22:09)  POCT Blood Glucose.: 133 mg/dL (08 Nov 2023 18:13)  POCT Blood Glucose.: 146 mg/dL (08 Nov 2023 12:14)  POCT Blood Glucose.: 166 mg/dL (08 Nov 2023 08:06)    CAPILLARY BLOOD GLUCOSE      POCT Blood Glucose.: 277 mg/dL (09 Nov 2023 06:00)    I&O's Summary    07 Nov 2023 07:01  -  08 Nov 2023 07:00  --------------------------------------------------------  IN: 1232.4 mL / OUT: 3705 mL / NET: -2472.6 mL    08 Nov 2023 07:01  -  09 Nov 2023 06:49  --------------------------------------------------------  IN: 1651 mL / OUT: 1780 mL / NET: -129 mL      Daily     Daily     PHYSICAL EXAMINATION:  General: intubated  HEENT: PERRLA, EOMI, moist mucous membranes  Neurology: A&Ox3, nonfocal, MOISE x 4  Respiratory: CTA B/L, normal respiratory effort, no wheezes, crackles, rales  CV: RRR, S1S2, no murmurs, rubs or gallops  Abdominal: Soft, NT, ND +BS, Last BM  Extremities: No edema, + peripheral pulses  Incisions:   Tubes:    LABS:  ABG - ( 09 Nov 2023 05:23 )  pH, Arterial: 7.37  pH, Blood: x     /  pCO2: 31    /  pO2: 174   / HCO3: 18    / Base Excess: -6.3  /  SaO2: 99.7                                    12.8   17.91 )-----------( 319      ( 09 Nov 2023 01:44 )             38.1     11-09    131<L>  |  98  |  47<H>  ----------------------------<  296<H>  3.8   |  16<L>  |  2.75<H>    Ca    9.0      09 Nov 2023 05:29  Phos  4.1     11-09  Mg     3.4     11-09    TPro  7.1  /  Alb  3.3  /  TBili  0.7  /  DBili  x   /  AST  186<H>  /  ALT  173<H>  /  AlkPhos  87  11-09    LIVER FUNCTIONS - ( 09 Nov 2023 05:29 )  Alb: 3.3 g/dL / Pro: 7.1 g/dL / ALK PHOS: 87 U/L / ALT: 173 U/L / AST: 186 U/L / GGT: x           PT/INR - ( 09 Nov 2023 05:29 )   PT: 12.8 sec;   INR: 1.23 ratio         PTT - ( 09 Nov 2023 05:29 )  PTT:61.1 sec        Urinalysis Basic - ( 09 Nov 2023 05:29 )    Color: x / Appearance: x / SG: x / pH: x  Gluc: 296 mg/dL / Ketone: x  / Bili: x / Urobili: x   Blood: x / Protein: x / Nitrite: x   Leuk Esterase: x / RBC: x / WBC x   Sq Epi: x / Non Sq Epi: x / Bacteria: x        TELEMETRY:     EKG:     IMAGING:       PATIENT:  LD VALENCIA  [id number]    CHIEF COMPLAINT:  Patient is a 58y old  Male who presents with a chief complaint of Mix Cardiogenic Septic shock (08 Nov 2023 19:19)      INTERVAL HISTORY/OVERNIGHT EVENTS: s/p v.fib arrest, transferred back to CICU.      REVIEW OF SYSTEMS: limited as patient is intubated    Constitutional:     [ ] negative [ ] fevers [ ] chills [ ] weight loss [ ] weight gain  HEENT:                  [ ] negative [ ] dry eyes [ ] eye irritation [ ] postnasal drip [ ] nasal congestion  CV:                         [ ] negative  [ ] chest pain [ ] orthopnea [ ] palpitations [ ] murmur  Resp:                     [ ] negative [ ] cough [ ] shortness of breath [ ] dyspnea [ ] wheezing [ ] sputum [ ] hemoptysis  GI:                          [ ] negative [ ] nausea [ ] vomiting [ ] diarrhea [ ] constipation [ ] abd pain [ ] dysphagia   :                        [ ] negative [ ] dysuria [ ] nocturia [ ] hematuria [ ] increased urinary frequency  Musculoskeletal: [ ] negative [ ] back pain [ ] myalgias [ ] arthralgias [ ] fracture  Skin:                       [ ] negative [ ] rash [ ] itch  Neurological:        [ ] negative [ ] headache [ ] dizziness [ ] syncope [ ] weakness [ ] numbness  Psychiatric:           [ ] negative [ ] anxiety [ ] depression  Endocrine:            [ ] negative [ ] diabetes [ ] thyroid problem  Heme/Lymph:      [ ] negative [ ] anemia [ ] bleeding problem  Allergic/Immune: [ ] negative [ ] itchy eyes [ ] nasal discharge [ ] hives [ ] angioedema    [ ] All other systems negative  [ ] Unable to assess ROS because ________.    MEDICATIONS:  MEDICATIONS  (STANDING):  albuterol    0.083% 2.5 milliGRAM(s) Nebulizer daily  aMIOdarone Infusion 0.5 mG/Min (16.7 mL/Hr) IV Continuous <Continuous>  aspirin  chewable 81 milliGRAM(s) Oral daily  atorvastatin 80 milliGRAM(s) Oral at bedtime  budesonide  80 MICROgram(s)/formoterol 4.5 MICROgram(s) Inhaler 2 Puff(s) Inhalation two times a day  chlorhexidine 2% Cloths 1 Application(s) Topical daily  dextrose 50% Injectable 25 Gram(s) IV Push once  dextrose 50% Injectable 12.5 Gram(s) IV Push once  furosemide   Injectable 80 milliGRAM(s) IV Push daily  glucagon  Injectable 1 milliGRAM(s) IntraMuscular once  heparin  Infusion 1600 Unit(s)/Hr (17 mL/Hr) IV Continuous <Continuous>  hydrALAZINE 100 milliGRAM(s) Oral every 8 hours  insulin regular Infusion 3 Unit(s)/Hr (3 mL/Hr) IV Continuous <Continuous>  isosorbide   dinitrate Tablet (ISORDIL) 30 milliGRAM(s) Oral three times a day  lidocaine   Infusion 2 mG/Min (15 mL/Hr) IV Continuous <Continuous>  pantoprazole  Injectable 40 milliGRAM(s) IV Push daily  propofol Infusion 20 MICROgram(s)/kG/Min (11.9 mL/Hr) IV Continuous <Continuous>  QUEtiapine 25 milliGRAM(s) Oral daily  spironolactone 25 milliGRAM(s) Oral daily  ticagrelor 90 milliGRAM(s) Oral every 12 hours  tiotropium 2.5 MICROgram(s) Inhaler 2 Puff(s) Inhalation daily    MEDICATIONS  (PRN):      ALLERGIES:  Allergies    penicillins (Unknown)    Intolerances    OBJECTIVE:  ICU Vital Signs Last 24 Hrs  T(C): 37.2 (08 Nov 2023 22:02), Max: 37.2 (08 Nov 2023 19:00)  T(F): 99 (08 Nov 2023 22:02), Max: 99 (08 Nov 2023 19:00)  HR: 94 (09 Nov 2023 06:00) (68 - 126)  BP: 94/57 (09 Nov 2023 01:00) (94/57 - 134/80)  BP(mean): 71 (09 Nov 2023 01:00) (71 - 100)  ABP: 99/52 (09 Nov 2023 06:00) (91/55 - 145/69)  ABP(mean): 73 (09 Nov 2023 06:00) (64 - 95)  RR: 22 (09 Nov 2023 06:00) (17 - 26)  SpO2: 95% (09 Nov 2023 06:00) (90% - 99%)    O2 Parameters below as of 09 Nov 2023 03:00  Patient On (Oxygen Delivery Method): ventilator    Mode: AC/ CMV (Assist Control/ Continuous Mandatory Ventilation)  RR (machine): 18  TV (machine): 500  FiO2: 50  PEEP: 6  MAP: 20  PIP: 24    Adult Advanced Hemodynamics Last 24 Hrs  CVP(mm Hg): 10 (09 Nov 2023 06:00) (1 - 12)  CVP(cm H2O): --  CO: --  CI: --  PA: --  PA(mean): --  PCWP: --  SVR: --  SVRI: --  PVR: --  PVRI: --  CAPILLARY BLOOD GLUCOSE      POCT Blood Glucose.: 277 mg/dL (09 Nov 2023 06:00)  POCT Blood Glucose.: 299 mg/dL (09 Nov 2023 05:09)  POCT Blood Glucose.: 330 mg/dL (09 Nov 2023 02:55)  POCT Blood Glucose.: 349 mg/dL (09 Nov 2023 02:06)  POCT Blood Glucose.: 209 mg/dL (08 Nov 2023 23:21)  POCT Blood Glucose.: 134 mg/dL (08 Nov 2023 22:09)  POCT Blood Glucose.: 133 mg/dL (08 Nov 2023 18:13)  POCT Blood Glucose.: 146 mg/dL (08 Nov 2023 12:14)  POCT Blood Glucose.: 166 mg/dL (08 Nov 2023 08:06)    CAPILLARY BLOOD GLUCOSE      POCT Blood Glucose.: 277 mg/dL (09 Nov 2023 06:00)    I&O's Summary    07 Nov 2023 07:01  -  08 Nov 2023 07:00  --------------------------------------------------------  IN: 1232.4 mL / OUT: 3705 mL / NET: -2472.6 mL    08 Nov 2023 07:01  -  09 Nov 2023 06:49  --------------------------------------------------------  IN: 1651 mL / OUT: 1780 mL / NET: -129 mL      Daily     Daily     PHYSICAL EXAMINATION:  General: intubated, calm  HEENT: PERRLA, moist mucous membranes  Neurology: lightly sedated, following simple commands and moving all extremities  Respiratory: CTA B/L, normal respiratory effort, no wheezes, crackles, rales  CV: RRR, S1S2, no murmurs, rubs or gallops  Abdominal: Soft, NT, ND +BS,  Extremities: No edema, + palpable peripheral pulses  Skin: warm and dry  Lines: R IJ swan, right axillary rangel clean, dry and intact    LABS:  ABG - ( 09 Nov 2023 05:23 )  pH, Arterial: 7.37  pH, Blood: x     /  pCO2: 31    /  pO2: 174   / HCO3: 18    / Base Excess: -6.3  /  SaO2: 99.7                              12.8   17.91 )-----------( 319      ( 09 Nov 2023 01:44 )             38.1     11-09    131<L>  |  98  |  47<H>  ----------------------------<  296<H>  3.8   |  16<L>  |  2.75<H>    Ca    9.0      09 Nov 2023 05:29  Phos  4.1     11-09  Mg     3.4     11-09    TPro  7.1  /  Alb  3.3  /  TBili  0.7  /  DBili  x   /  AST  186<H>  /  ALT  173<H>  /  AlkPhos  87  11-09    LIVER FUNCTIONS - ( 09 Nov 2023 05:29 )  Alb: 3.3 g/dL / Pro: 7.1 g/dL / ALK PHOS: 87 U/L / ALT: 173 U/L / AST: 186 U/L / GGT: x           PT/INR - ( 09 Nov 2023 05:29 )   PT: 12.8 sec;   INR: 1.23 ratio         PTT - ( 09 Nov 2023 05:29 )  PTT:61.1 sec        Urinalysis Basic - ( 09 Nov 2023 05:29 )    Color: x / Appearance: x / SG: x / pH: x  Gluc: 296 mg/dL / Ketone: x  / Bili: x / Urobili: x   Blood: x / Protein: x / Nitrite: x   Leuk Esterase: x / RBC: x / WBC x   Sq Epi: x / Non Sq Epi: x / Bacteria: x        TELEMETRY:     EKG:     IMAGING:       PATIENT:  LD VALENCIA  [id number]    CHIEF COMPLAINT:  Patient is a 58y old  Male who presents with a chief complaint of Mix Cardiogenic Septic shock (08 Nov 2023 19:19)      INTERVAL HISTORY/OVERNIGHT EVENTS: s/p v.fib arrest, transferred back to CICU.      REVIEW OF SYSTEMS: limited as patient is intubated    Constitutional:     [ ] negative [ ] fevers [ ] chills [ ] weight loss [ ] weight gain  HEENT:                  [ ] negative [ ] dry eyes [ ] eye irritation [ ] postnasal drip [ ] nasal congestion  CV:                         [ ] negative  [ ] chest pain [ ] orthopnea [ ] palpitations [ ] murmur  Resp:                     [ ] negative [ ] cough [ ] shortness of breath [ ] dyspnea [ ] wheezing [ ] sputum [ ] hemoptysis  GI:                          [ ] negative [ ] nausea [ ] vomiting [ ] diarrhea [ ] constipation [ ] abd pain [ ] dysphagia   :                        [ ] negative [ ] dysuria [ ] nocturia [ ] hematuria [ ] increased urinary frequency  Musculoskeletal: [ ] negative [ ] back pain [ ] myalgias [ ] arthralgias [ ] fracture  Skin:                       [ ] negative [ ] rash [ ] itch  Neurological:        [ ] negative [ ] headache [ ] dizziness [ ] syncope [ ] weakness [ ] numbness  Psychiatric:           [ ] negative [ ] anxiety [ ] depression  Endocrine:            [ ] negative [ ] diabetes [ ] thyroid problem  Heme/Lymph:      [ ] negative [ ] anemia [ ] bleeding problem  Allergic/Immune: [ ] negative [ ] itchy eyes [ ] nasal discharge [ ] hives [ ] angioedema    [ ] All other systems negative  [ ] Unable to assess ROS because ________.    MEDICATIONS:  MEDICATIONS  (STANDING):  albuterol    0.083% 2.5 milliGRAM(s) Nebulizer daily  aMIOdarone Infusion 0.5 mG/Min (16.7 mL/Hr) IV Continuous <Continuous>  aspirin  chewable 81 milliGRAM(s) Oral daily  atorvastatin 80 milliGRAM(s) Oral at bedtime  budesonide  80 MICROgram(s)/formoterol 4.5 MICROgram(s) Inhaler 2 Puff(s) Inhalation two times a day  chlorhexidine 2% Cloths 1 Application(s) Topical daily  dextrose 50% Injectable 25 Gram(s) IV Push once  dextrose 50% Injectable 12.5 Gram(s) IV Push once  furosemide   Injectable 80 milliGRAM(s) IV Push daily  glucagon  Injectable 1 milliGRAM(s) IntraMuscular once  heparin  Infusion 1600 Unit(s)/Hr (17 mL/Hr) IV Continuous <Continuous>  hydrALAZINE 100 milliGRAM(s) Oral every 8 hours  insulin regular Infusion 3 Unit(s)/Hr (3 mL/Hr) IV Continuous <Continuous>  isosorbide   dinitrate Tablet (ISORDIL) 30 milliGRAM(s) Oral three times a day  lidocaine   Infusion 2 mG/Min (15 mL/Hr) IV Continuous <Continuous>  pantoprazole  Injectable 40 milliGRAM(s) IV Push daily  propofol Infusion 20 MICROgram(s)/kG/Min (11.9 mL/Hr) IV Continuous <Continuous>  QUEtiapine 25 milliGRAM(s) Oral daily  spironolactone 25 milliGRAM(s) Oral daily  ticagrelor 90 milliGRAM(s) Oral every 12 hours  tiotropium 2.5 MICROgram(s) Inhaler 2 Puff(s) Inhalation daily    MEDICATIONS  (PRN):      ALLERGIES:  Allergies    penicillins (Unknown)    Intolerances    OBJECTIVE:  ICU Vital Signs Last 24 Hrs  T(C): 37.2 (08 Nov 2023 22:02), Max: 37.2 (08 Nov 2023 19:00)  T(F): 99 (08 Nov 2023 22:02), Max: 99 (08 Nov 2023 19:00)  HR: 94 (09 Nov 2023 06:00) (68 - 126)  BP: 94/57 (09 Nov 2023 01:00) (94/57 - 134/80)  BP(mean): 71 (09 Nov 2023 01:00) (71 - 100)  ABP: 99/52 (09 Nov 2023 06:00) (91/55 - 145/69)  ABP(mean): 73 (09 Nov 2023 06:00) (64 - 95)  RR: 22 (09 Nov 2023 06:00) (17 - 26)  SpO2: 95% (09 Nov 2023 06:00) (90% - 99%)    O2 Parameters below as of 09 Nov 2023 03:00  Patient On (Oxygen Delivery Method): ventilator    Mode: AC/ CMV (Assist Control/ Continuous Mandatory Ventilation)  RR (machine): 18  TV (machine): 500  FiO2: 50  PEEP: 6  MAP: 20  PIP: 24    Adult Advanced Hemodynamics Last 24 Hrs  CVP(mm Hg): 10 (09 Nov 2023 06:00) (1 - 12)  CVP(cm H2O): --  CO: --  CI: --  PA: --  PA(mean): --  PCWP: --  SVR: --  SVRI: --  PVR: --  PVRI: --  CAPILLARY BLOOD GLUCOSE      POCT Blood Glucose.: 277 mg/dL (09 Nov 2023 06:00)  POCT Blood Glucose.: 299 mg/dL (09 Nov 2023 05:09)  POCT Blood Glucose.: 330 mg/dL (09 Nov 2023 02:55)  POCT Blood Glucose.: 349 mg/dL (09 Nov 2023 02:06)  POCT Blood Glucose.: 209 mg/dL (08 Nov 2023 23:21)  POCT Blood Glucose.: 134 mg/dL (08 Nov 2023 22:09)  POCT Blood Glucose.: 133 mg/dL (08 Nov 2023 18:13)  POCT Blood Glucose.: 146 mg/dL (08 Nov 2023 12:14)  POCT Blood Glucose.: 166 mg/dL (08 Nov 2023 08:06)    CAPILLARY BLOOD GLUCOSE      POCT Blood Glucose.: 277 mg/dL (09 Nov 2023 06:00)    I&O's Summary    07 Nov 2023 07:01  -  08 Nov 2023 07:00  --------------------------------------------------------  IN: 1232.4 mL / OUT: 3705 mL / NET: -2472.6 mL    08 Nov 2023 07:01  -  09 Nov 2023 06:49  --------------------------------------------------------  IN: 1651 mL / OUT: 1780 mL / NET: -129 mL      Daily     Daily     PHYSICAL EXAMINATION:  General: intubated, calm  HEENT: PERRLA, moist mucous membranes  Neurology: lightly sedated, following simple commands and moving all extremities  Respiratory: CTA B/L, normal respiratory effort, no wheezes, crackles, rales  CV: RRR, S1S2, no murmurs, rubs or gallops  Abdominal: Soft, NT, ND +BS,  Extremities: No edema, + palpable peripheral pulses  Skin: warm and dry  Lines: R IJ swan, right axillary rangel clean, dry and intact    LABS:  ABG - ( 09 Nov 2023 05:23 )  pH, Arterial: 7.37  pH, Blood: x     /  pCO2: 31    /  pO2: 174   / HCO3: 18    / Base Excess: -6.3  /  SaO2: 99.7                              12.8   17.91 )-----------( 319      ( 09 Nov 2023 01:44 )             38.1     11-09    131<L>  |  98  |  47<H>  ----------------------------<  296<H>  3.8   |  16<L>  |  2.75<H>    Ca    9.0      09 Nov 2023 05:29  Phos  4.1     11-09  Mg     3.4     11-09    TPro  7.1  /  Alb  3.3  /  TBili  0.7  /  DBili  x   /  AST  186<H>  /  ALT  173<H>  /  AlkPhos  87  11-09    LIVER FUNCTIONS - ( 09 Nov 2023 05:29 )  Alb: 3.3 g/dL / Pro: 7.1 g/dL / ALK PHOS: 87 U/L / ALT: 173 U/L / AST: 186 U/L / GGT: x           PT/INR - ( 09 Nov 2023 05:29 )   PT: 12.8 sec;   INR: 1.23 ratio         PTT - ( 09 Nov 2023 05:29 )  PTT:61.1 sec        Urinalysis Basic - ( 09 Nov 2023 05:29 )    Color: x / Appearance: x / SG: x / pH: x  Gluc: 296 mg/dL / Ketone: x  / Bili: x / Urobili: x   Blood: x / Protein: x / Nitrite: x   Leuk Esterase: x / RBC: x / WBC x   Sq Epi: x / Non Sq Epi: x / Bacteria: x        TELEMETRY: reviewed    EKG: reviewed    IMAGING: reviewed

## 2023-11-10 LAB
ALBUMIN SERPL ELPH-MCNC: 3.1 G/DL — LOW (ref 3.3–5)
ALBUMIN SERPL ELPH-MCNC: 3.1 G/DL — LOW (ref 3.3–5)
ALBUMIN SERPL ELPH-MCNC: 3.2 G/DL — LOW (ref 3.3–5)
ALBUMIN SERPL ELPH-MCNC: 3.2 G/DL — LOW (ref 3.3–5)
ALBUMIN SERPL ELPH-MCNC: 3.4 G/DL — SIGNIFICANT CHANGE UP (ref 3.3–5)
ALBUMIN SERPL ELPH-MCNC: 3.4 G/DL — SIGNIFICANT CHANGE UP (ref 3.3–5)
ALP SERPL-CCNC: 71 U/L — SIGNIFICANT CHANGE UP (ref 40–120)
ALP SERPL-CCNC: 71 U/L — SIGNIFICANT CHANGE UP (ref 40–120)
ALP SERPL-CCNC: 72 U/L — SIGNIFICANT CHANGE UP (ref 40–120)
ALP SERPL-CCNC: 72 U/L — SIGNIFICANT CHANGE UP (ref 40–120)
ALP SERPL-CCNC: 74 U/L — SIGNIFICANT CHANGE UP (ref 40–120)
ALP SERPL-CCNC: 74 U/L — SIGNIFICANT CHANGE UP (ref 40–120)
ALT FLD-CCNC: 141 U/L — HIGH (ref 10–45)
ALT FLD-CCNC: 141 U/L — HIGH (ref 10–45)
ALT FLD-CCNC: 153 U/L — HIGH (ref 10–45)
ALT FLD-CCNC: 153 U/L — HIGH (ref 10–45)
ALT FLD-CCNC: 162 U/L — HIGH (ref 10–45)
ALT FLD-CCNC: 162 U/L — HIGH (ref 10–45)
AMPHET UR-MCNC: NEGATIVE — SIGNIFICANT CHANGE UP
AMPHET UR-MCNC: NEGATIVE — SIGNIFICANT CHANGE UP
AMYLASE P1 CFR SERPL: 96 U/L — SIGNIFICANT CHANGE UP (ref 25–125)
AMYLASE P1 CFR SERPL: 96 U/L — SIGNIFICANT CHANGE UP (ref 25–125)
ANION GAP SERPL CALC-SCNC: 16 MMOL/L — SIGNIFICANT CHANGE UP (ref 5–17)
ANION GAP SERPL CALC-SCNC: 16 MMOL/L — SIGNIFICANT CHANGE UP (ref 5–17)
ANION GAP SERPL CALC-SCNC: 18 MMOL/L — HIGH (ref 5–17)
APPEARANCE UR: ABNORMAL
APPEARANCE UR: ABNORMAL
APTT BLD: 55.1 SEC — HIGH (ref 24.5–35.6)
APTT BLD: 55.1 SEC — HIGH (ref 24.5–35.6)
APTT BLD: 86.9 SEC — HIGH (ref 24.5–35.6)
APTT BLD: 86.9 SEC — HIGH (ref 24.5–35.6)
APTT BLD: 95.9 SEC — HIGH (ref 24.5–35.6)
APTT BLD: 95.9 SEC — HIGH (ref 24.5–35.6)
AST SERPL-CCNC: 102 U/L — HIGH (ref 10–40)
AST SERPL-CCNC: 102 U/L — HIGH (ref 10–40)
AST SERPL-CCNC: 103 U/L — HIGH (ref 10–40)
AST SERPL-CCNC: 103 U/L — HIGH (ref 10–40)
AST SERPL-CCNC: 113 U/L — HIGH (ref 10–40)
AST SERPL-CCNC: 113 U/L — HIGH (ref 10–40)
BACTERIA # UR AUTO: NEGATIVE /HPF — SIGNIFICANT CHANGE UP
BACTERIA # UR AUTO: NEGATIVE /HPF — SIGNIFICANT CHANGE UP
BARBITURATES UR SCN-MCNC: NEGATIVE — SIGNIFICANT CHANGE UP
BARBITURATES UR SCN-MCNC: NEGATIVE — SIGNIFICANT CHANGE UP
BASE EXCESS BLDMV CALC-SCNC: -2 MMOL/L — SIGNIFICANT CHANGE UP (ref -3–3)
BASE EXCESS BLDMV CALC-SCNC: -2 MMOL/L — SIGNIFICANT CHANGE UP (ref -3–3)
BASE EXCESS BLDMV CALC-SCNC: -3.9 MMOL/L — LOW (ref -3–3)
BASE EXCESS BLDMV CALC-SCNC: -3.9 MMOL/L — LOW (ref -3–3)
BASE EXCESS BLDMV CALC-SCNC: -5.2 MMOL/L — LOW (ref -3–3)
BASE EXCESS BLDMV CALC-SCNC: -5.2 MMOL/L — LOW (ref -3–3)
BASE EXCESS BLDMV CALC-SCNC: -5.4 MMOL/L — LOW (ref -3–3)
BASE EXCESS BLDMV CALC-SCNC: -5.4 MMOL/L — LOW (ref -3–3)
BASE EXCESS BLDMV CALC-SCNC: -6.2 MMOL/L — LOW (ref -3–3)
BASOPHILS # BLD AUTO: 0.02 K/UL — SIGNIFICANT CHANGE UP (ref 0–0.2)
BASOPHILS # BLD AUTO: 0.02 K/UL — SIGNIFICANT CHANGE UP (ref 0–0.2)
BASOPHILS # BLD AUTO: 0.03 K/UL — SIGNIFICANT CHANGE UP (ref 0–0.2)
BASOPHILS # BLD AUTO: 0.03 K/UL — SIGNIFICANT CHANGE UP (ref 0–0.2)
BASOPHILS NFR BLD AUTO: 0.2 % — SIGNIFICANT CHANGE UP (ref 0–2)
BENZODIAZ UR-MCNC: NEGATIVE — SIGNIFICANT CHANGE UP
BENZODIAZ UR-MCNC: NEGATIVE — SIGNIFICANT CHANGE UP
BILIRUB SERPL-MCNC: 0.6 MG/DL — SIGNIFICANT CHANGE UP (ref 0.2–1.2)
BILIRUB SERPL-MCNC: 0.6 MG/DL — SIGNIFICANT CHANGE UP (ref 0.2–1.2)
BILIRUB SERPL-MCNC: 0.7 MG/DL — SIGNIFICANT CHANGE UP (ref 0.2–1.2)
BILIRUB SERPL-MCNC: 0.7 MG/DL — SIGNIFICANT CHANGE UP (ref 0.2–1.2)
BILIRUB SERPL-MCNC: 1 MG/DL — SIGNIFICANT CHANGE UP (ref 0.2–1.2)
BILIRUB SERPL-MCNC: 1 MG/DL — SIGNIFICANT CHANGE UP (ref 0.2–1.2)
BILIRUB UR-MCNC: NEGATIVE — SIGNIFICANT CHANGE UP
BILIRUB UR-MCNC: NEGATIVE — SIGNIFICANT CHANGE UP
BLD GP AB SCN SERPL QL: NEGATIVE — SIGNIFICANT CHANGE UP
BLD GP AB SCN SERPL QL: NEGATIVE — SIGNIFICANT CHANGE UP
BUN SERPL-MCNC: 51 MG/DL — HIGH (ref 7–23)
BUN SERPL-MCNC: 53 MG/DL — HIGH (ref 7–23)
BUN SERPL-MCNC: 53 MG/DL — HIGH (ref 7–23)
CALCIUM SERPL-MCNC: 8.7 MG/DL — SIGNIFICANT CHANGE UP (ref 8.4–10.5)
CALCIUM SERPL-MCNC: 8.7 MG/DL — SIGNIFICANT CHANGE UP (ref 8.4–10.5)
CALCIUM SERPL-MCNC: 9.1 MG/DL — SIGNIFICANT CHANGE UP (ref 8.4–10.5)
CHLORIDE SERPL-SCNC: 100 MMOL/L — SIGNIFICANT CHANGE UP (ref 96–108)
CHLORIDE SERPL-SCNC: 100 MMOL/L — SIGNIFICANT CHANGE UP (ref 96–108)
CHLORIDE SERPL-SCNC: 101 MMOL/L — SIGNIFICANT CHANGE UP (ref 96–108)
CHLORIDE SERPL-SCNC: 101 MMOL/L — SIGNIFICANT CHANGE UP (ref 96–108)
CHLORIDE SERPL-SCNC: 99 MMOL/L — SIGNIFICANT CHANGE UP (ref 96–108)
CHLORIDE SERPL-SCNC: 99 MMOL/L — SIGNIFICANT CHANGE UP (ref 96–108)
CHOLEST SERPL-MCNC: 130 MG/DL — SIGNIFICANT CHANGE UP
CHOLEST SERPL-MCNC: 130 MG/DL — SIGNIFICANT CHANGE UP
CO2 BLDMV-SCNC: 21 MMOL/L — SIGNIFICANT CHANGE UP (ref 21–29)
CO2 BLDMV-SCNC: 22 MMOL/L — SIGNIFICANT CHANGE UP (ref 21–29)
CO2 BLDMV-SCNC: 23 MMOL/L — SIGNIFICANT CHANGE UP (ref 21–29)
CO2 BLDMV-SCNC: 23 MMOL/L — SIGNIFICANT CHANGE UP (ref 21–29)
CO2 SERPL-SCNC: 15 MMOL/L — LOW (ref 22–31)
CO2 SERPL-SCNC: 15 MMOL/L — LOW (ref 22–31)
CO2 SERPL-SCNC: 16 MMOL/L — LOW (ref 22–31)
CO2 SERPL-SCNC: 16 MMOL/L — LOW (ref 22–31)
CO2 SERPL-SCNC: 18 MMOL/L — LOW (ref 22–31)
CO2 SERPL-SCNC: 18 MMOL/L — LOW (ref 22–31)
COCAINE METAB.OTHER UR-MCNC: NEGATIVE — SIGNIFICANT CHANGE UP
COCAINE METAB.OTHER UR-MCNC: NEGATIVE — SIGNIFICANT CHANGE UP
COLOR SPEC: YELLOW — SIGNIFICANT CHANGE UP
COLOR SPEC: YELLOW — SIGNIFICANT CHANGE UP
COVID-19 NUCLEOCAPSID GAM AB INTERP: POSITIVE
COVID-19 NUCLEOCAPSID GAM AB INTERP: POSITIVE
COVID-19 NUCLEOCAPSID TOTAL GAM ANTIBODY RESULT: 29.4 INDEX — HIGH
COVID-19 NUCLEOCAPSID TOTAL GAM ANTIBODY RESULT: 29.4 INDEX — HIGH
COVID-19 SPIKE DOMAIN AB INTERP: POSITIVE
COVID-19 SPIKE DOMAIN AB INTERP: POSITIVE
COVID-19 SPIKE DOMAIN ANTIBODY RESULT: >250 U/ML — HIGH
COVID-19 SPIKE DOMAIN ANTIBODY RESULT: >250 U/ML — HIGH
CREAT SERPL-MCNC: 3.76 MG/DL — HIGH (ref 0.5–1.3)
CREAT SERPL-MCNC: 3.76 MG/DL — HIGH (ref 0.5–1.3)
CREAT SERPL-MCNC: 4.03 MG/DL — HIGH (ref 0.5–1.3)
CREAT SERPL-MCNC: 4.03 MG/DL — HIGH (ref 0.5–1.3)
CREAT SERPL-MCNC: 4.07 MG/DL — HIGH (ref 0.5–1.3)
CRP SERPL-MCNC: 139 MG/L — HIGH (ref 0–4)
CRP SERPL-MCNC: 139 MG/L — HIGH (ref 0–4)
DIFF PNL FLD: NEGATIVE — SIGNIFICANT CHANGE UP
DIFF PNL FLD: NEGATIVE — SIGNIFICANT CHANGE UP
EGFR: 16 ML/MIN/1.73M2 — LOW
EGFR: 18 ML/MIN/1.73M2 — LOW
EGFR: 18 ML/MIN/1.73M2 — LOW
EOSINOPHIL # BLD AUTO: 0.1 K/UL — SIGNIFICANT CHANGE UP (ref 0–0.5)
EOSINOPHIL NFR BLD AUTO: 0.7 % — SIGNIFICANT CHANGE UP (ref 0–6)
EOSINOPHIL NFR BLD AUTO: 0.7 % — SIGNIFICANT CHANGE UP (ref 0–6)
EOSINOPHIL NFR BLD AUTO: 0.9 % — SIGNIFICANT CHANGE UP (ref 0–6)
EOSINOPHIL NFR BLD AUTO: 0.9 % — SIGNIFICANT CHANGE UP (ref 0–6)
FERRITIN SERPL-MCNC: 1646 NG/ML — HIGH (ref 30–400)
FERRITIN SERPL-MCNC: 1646 NG/ML — HIGH (ref 30–400)
GAS PNL BLDA: SIGNIFICANT CHANGE UP
GAS PNL BLDMV: SIGNIFICANT CHANGE UP
GGT SERPL-CCNC: 155 U/L — HIGH (ref 9–50)
GGT SERPL-CCNC: 155 U/L — HIGH (ref 9–50)
GLUCOSE BLDC GLUCOMTR-MCNC: 111 MG/DL — HIGH (ref 70–99)
GLUCOSE BLDC GLUCOMTR-MCNC: 111 MG/DL — HIGH (ref 70–99)
GLUCOSE BLDC GLUCOMTR-MCNC: 112 MG/DL — HIGH (ref 70–99)
GLUCOSE BLDC GLUCOMTR-MCNC: 112 MG/DL — HIGH (ref 70–99)
GLUCOSE BLDC GLUCOMTR-MCNC: 115 MG/DL — HIGH (ref 70–99)
GLUCOSE BLDC GLUCOMTR-MCNC: 115 MG/DL — HIGH (ref 70–99)
GLUCOSE BLDC GLUCOMTR-MCNC: 116 MG/DL — HIGH (ref 70–99)
GLUCOSE BLDC GLUCOMTR-MCNC: 116 MG/DL — HIGH (ref 70–99)
GLUCOSE BLDC GLUCOMTR-MCNC: 118 MG/DL — HIGH (ref 70–99)
GLUCOSE BLDC GLUCOMTR-MCNC: 118 MG/DL — HIGH (ref 70–99)
GLUCOSE BLDC GLUCOMTR-MCNC: 135 MG/DL — HIGH (ref 70–99)
GLUCOSE BLDC GLUCOMTR-MCNC: 136 MG/DL — HIGH (ref 70–99)
GLUCOSE BLDC GLUCOMTR-MCNC: 136 MG/DL — HIGH (ref 70–99)
GLUCOSE BLDC GLUCOMTR-MCNC: 139 MG/DL — HIGH (ref 70–99)
GLUCOSE BLDC GLUCOMTR-MCNC: 141 MG/DL — HIGH (ref 70–99)
GLUCOSE BLDC GLUCOMTR-MCNC: 142 MG/DL — HIGH (ref 70–99)
GLUCOSE BLDC GLUCOMTR-MCNC: 142 MG/DL — HIGH (ref 70–99)
GLUCOSE BLDC GLUCOMTR-MCNC: 170 MG/DL — HIGH (ref 70–99)
GLUCOSE BLDC GLUCOMTR-MCNC: 170 MG/DL — HIGH (ref 70–99)
GLUCOSE BLDC GLUCOMTR-MCNC: 177 MG/DL — HIGH (ref 70–99)
GLUCOSE BLDC GLUCOMTR-MCNC: 177 MG/DL — HIGH (ref 70–99)
GLUCOSE BLDC GLUCOMTR-MCNC: 197 MG/DL — HIGH (ref 70–99)
GLUCOSE BLDC GLUCOMTR-MCNC: 197 MG/DL — HIGH (ref 70–99)
GLUCOSE SERPL-MCNC: 112 MG/DL — HIGH (ref 70–99)
GLUCOSE SERPL-MCNC: 112 MG/DL — HIGH (ref 70–99)
GLUCOSE SERPL-MCNC: 122 MG/DL — HIGH (ref 70–99)
GLUCOSE SERPL-MCNC: 122 MG/DL — HIGH (ref 70–99)
GLUCOSE SERPL-MCNC: 131 MG/DL — HIGH (ref 70–99)
GLUCOSE SERPL-MCNC: 131 MG/DL — HIGH (ref 70–99)
GLUCOSE UR QL: NEGATIVE — SIGNIFICANT CHANGE UP
GLUCOSE UR QL: NEGATIVE — SIGNIFICANT CHANGE UP
GRAN CASTS # UR COMP ASSIST: PRESENT
GRAN CASTS # UR COMP ASSIST: PRESENT
HCO3 BLDMV-SCNC: 20 MMOL/L — SIGNIFICANT CHANGE UP (ref 20–28)
HCO3 BLDMV-SCNC: 21 MMOL/L — SIGNIFICANT CHANGE UP (ref 20–28)
HCO3 BLDMV-SCNC: 22 MMOL/L — SIGNIFICANT CHANGE UP (ref 20–28)
HCO3 BLDMV-SCNC: 22 MMOL/L — SIGNIFICANT CHANGE UP (ref 20–28)
HCT VFR BLD CALC: 33 % — LOW (ref 39–50)
HCT VFR BLD CALC: 33 % — LOW (ref 39–50)
HCT VFR BLD CALC: 36 % — LOW (ref 39–50)
HCT VFR BLD CALC: 36 % — LOW (ref 39–50)
HDLC SERPL-MCNC: 37 MG/DL — LOW
HDLC SERPL-MCNC: 37 MG/DL — LOW
HGB BLD-MCNC: 11.2 G/DL — LOW (ref 13–17)
HGB BLD-MCNC: 11.2 G/DL — LOW (ref 13–17)
HGB BLD-MCNC: 12 G/DL — LOW (ref 13–17)
HGB BLD-MCNC: 12 G/DL — LOW (ref 13–17)
HOROWITZ INDEX BLDMV+IHG-RTO: 30 — SIGNIFICANT CHANGE UP
HOROWITZ INDEX BLDMV+IHG-RTO: 30 — SIGNIFICANT CHANGE UP
HOROWITZ INDEX BLDMV+IHG-RTO: 40 — SIGNIFICANT CHANGE UP
HOROWITZ INDEX BLDMV+IHG-RTO: 40 — SIGNIFICANT CHANGE UP
IMM GRANULOCYTES NFR BLD AUTO: 0.6 % — SIGNIFICANT CHANGE UP (ref 0–0.9)
IMM GRANULOCYTES NFR BLD AUTO: 0.6 % — SIGNIFICANT CHANGE UP (ref 0–0.9)
IMM GRANULOCYTES NFR BLD AUTO: 0.9 % — SIGNIFICANT CHANGE UP (ref 0–0.9)
IMM GRANULOCYTES NFR BLD AUTO: 0.9 % — SIGNIFICANT CHANGE UP (ref 0–0.9)
INR BLD: 1.29 RATIO — HIGH (ref 0.85–1.18)
INR BLD: 1.29 RATIO — HIGH (ref 0.85–1.18)
INR BLD: 1.41 RATIO — HIGH (ref 0.85–1.18)
INR BLD: 1.41 RATIO — HIGH (ref 0.85–1.18)
IRON SATN MFR SERPL: 20 % — SIGNIFICANT CHANGE UP (ref 16–55)
IRON SATN MFR SERPL: 20 % — SIGNIFICANT CHANGE UP (ref 16–55)
IRON SATN MFR SERPL: 46 UG/DL — SIGNIFICANT CHANGE UP (ref 45–165)
IRON SATN MFR SERPL: 46 UG/DL — SIGNIFICANT CHANGE UP (ref 45–165)
KETONES UR-MCNC: NEGATIVE MG/DL — SIGNIFICANT CHANGE UP
KETONES UR-MCNC: NEGATIVE MG/DL — SIGNIFICANT CHANGE UP
LDH SERPL L TO P-CCNC: 577 U/L — HIGH (ref 50–242)
LDH SERPL L TO P-CCNC: 577 U/L — HIGH (ref 50–242)
LEUKOCYTE ESTERASE UR-ACNC: NEGATIVE — SIGNIFICANT CHANGE UP
LEUKOCYTE ESTERASE UR-ACNC: NEGATIVE — SIGNIFICANT CHANGE UP
LIDOCAIN IGE QN: 47 U/L — SIGNIFICANT CHANGE UP (ref 7–60)
LIDOCAIN IGE QN: 47 U/L — SIGNIFICANT CHANGE UP (ref 7–60)
LIPID PNL WITH DIRECT LDL SERPL: 75 MG/DL — SIGNIFICANT CHANGE UP
LIPID PNL WITH DIRECT LDL SERPL: 75 MG/DL — SIGNIFICANT CHANGE UP
LYMPHOCYTES # BLD AUTO: 1.48 K/UL — SIGNIFICANT CHANGE UP (ref 1–3.3)
LYMPHOCYTES # BLD AUTO: 1.48 K/UL — SIGNIFICANT CHANGE UP (ref 1–3.3)
LYMPHOCYTES # BLD AUTO: 1.63 K/UL — SIGNIFICANT CHANGE UP (ref 1–3.3)
LYMPHOCYTES # BLD AUTO: 1.63 K/UL — SIGNIFICANT CHANGE UP (ref 1–3.3)
LYMPHOCYTES # BLD AUTO: 10.6 % — LOW (ref 13–44)
LYMPHOCYTES # BLD AUTO: 10.6 % — LOW (ref 13–44)
LYMPHOCYTES # BLD AUTO: 14.2 % — SIGNIFICANT CHANGE UP (ref 13–44)
LYMPHOCYTES # BLD AUTO: 14.2 % — SIGNIFICANT CHANGE UP (ref 13–44)
MAGNESIUM SERPL-MCNC: 2.5 MG/DL — SIGNIFICANT CHANGE UP (ref 1.6–2.6)
MAGNESIUM SERPL-MCNC: 2.5 MG/DL — SIGNIFICANT CHANGE UP (ref 1.6–2.6)
MAGNESIUM SERPL-MCNC: 2.9 MG/DL — HIGH (ref 1.6–2.6)
MAGNESIUM SERPL-MCNC: 2.9 MG/DL — HIGH (ref 1.6–2.6)
MAGNESIUM SERPL-MCNC: 3 MG/DL — HIGH (ref 1.6–2.6)
MAGNESIUM SERPL-MCNC: 3 MG/DL — HIGH (ref 1.6–2.6)
MCHC RBC-ENTMCNC: 28.4 PG — SIGNIFICANT CHANGE UP (ref 27–34)
MCHC RBC-ENTMCNC: 28.4 PG — SIGNIFICANT CHANGE UP (ref 27–34)
MCHC RBC-ENTMCNC: 28.9 PG — SIGNIFICANT CHANGE UP (ref 27–34)
MCHC RBC-ENTMCNC: 28.9 PG — SIGNIFICANT CHANGE UP (ref 27–34)
MCHC RBC-ENTMCNC: 33.3 GM/DL — SIGNIFICANT CHANGE UP (ref 32–36)
MCHC RBC-ENTMCNC: 33.3 GM/DL — SIGNIFICANT CHANGE UP (ref 32–36)
MCHC RBC-ENTMCNC: 33.9 GM/DL — SIGNIFICANT CHANGE UP (ref 32–36)
MCHC RBC-ENTMCNC: 33.9 GM/DL — SIGNIFICANT CHANGE UP (ref 32–36)
MCV RBC AUTO: 85.3 FL — SIGNIFICANT CHANGE UP (ref 80–100)
METHADONE UR-MCNC: NEGATIVE — SIGNIFICANT CHANGE UP
METHADONE UR-MCNC: NEGATIVE — SIGNIFICANT CHANGE UP
MONOCYTES # BLD AUTO: 1.2 K/UL — HIGH (ref 0–0.9)
MONOCYTES # BLD AUTO: 1.2 K/UL — HIGH (ref 0–0.9)
MONOCYTES # BLD AUTO: 1.5 K/UL — HIGH (ref 0–0.9)
MONOCYTES # BLD AUTO: 1.5 K/UL — HIGH (ref 0–0.9)
MONOCYTES NFR BLD AUTO: 10.5 % — SIGNIFICANT CHANGE UP (ref 2–14)
MONOCYTES NFR BLD AUTO: 10.5 % — SIGNIFICANT CHANGE UP (ref 2–14)
MONOCYTES NFR BLD AUTO: 10.7 % — SIGNIFICANT CHANGE UP (ref 2–14)
MONOCYTES NFR BLD AUTO: 10.7 % — SIGNIFICANT CHANGE UP (ref 2–14)
MRSA PCR RESULT.: SIGNIFICANT CHANGE UP
MRSA PCR RESULT.: SIGNIFICANT CHANGE UP
NEUTROPHILS # BLD AUTO: 10.81 K/UL — HIGH (ref 1.8–7.4)
NEUTROPHILS # BLD AUTO: 10.81 K/UL — HIGH (ref 1.8–7.4)
NEUTROPHILS # BLD AUTO: 8.42 K/UL — HIGH (ref 1.8–7.4)
NEUTROPHILS # BLD AUTO: 8.42 K/UL — HIGH (ref 1.8–7.4)
NEUTROPHILS NFR BLD AUTO: 73.3 % — SIGNIFICANT CHANGE UP (ref 43–77)
NEUTROPHILS NFR BLD AUTO: 73.3 % — SIGNIFICANT CHANGE UP (ref 43–77)
NEUTROPHILS NFR BLD AUTO: 77.2 % — HIGH (ref 43–77)
NEUTROPHILS NFR BLD AUTO: 77.2 % — HIGH (ref 43–77)
NITRITE UR-MCNC: NEGATIVE — SIGNIFICANT CHANGE UP
NITRITE UR-MCNC: NEGATIVE — SIGNIFICANT CHANGE UP
NON HDL CHOLESTEROL: 93 MG/DL — SIGNIFICANT CHANGE UP
NON HDL CHOLESTEROL: 93 MG/DL — SIGNIFICANT CHANGE UP
NRBC # BLD: 0 /100 WBCS — SIGNIFICANT CHANGE UP (ref 0–0)
O2 CT VFR BLD CALC: 34 MMHG — SIGNIFICANT CHANGE UP (ref 30–65)
O2 CT VFR BLD CALC: 34 MMHG — SIGNIFICANT CHANGE UP (ref 30–65)
O2 CT VFR BLD CALC: 35 MMHG — SIGNIFICANT CHANGE UP (ref 30–65)
O2 CT VFR BLD CALC: 35 MMHG — SIGNIFICANT CHANGE UP (ref 30–65)
O2 CT VFR BLD CALC: 39 MMHG — SIGNIFICANT CHANGE UP (ref 30–65)
O2 CT VFR BLD CALC: 39 MMHG — SIGNIFICANT CHANGE UP (ref 30–65)
O2 CT VFR BLD CALC: 45 MMHG — SIGNIFICANT CHANGE UP (ref 30–65)
O2 CT VFR BLD CALC: 45 MMHG — SIGNIFICANT CHANGE UP (ref 30–65)
O2 CT VFR BLD CALC: 51 MMHG — SIGNIFICANT CHANGE UP (ref 30–65)
O2 CT VFR BLD CALC: 51 MMHG — SIGNIFICANT CHANGE UP (ref 30–65)
O2 CT VFR BLD CALC: 52 MMHG — SIGNIFICANT CHANGE UP (ref 30–65)
O2 CT VFR BLD CALC: 52 MMHG — SIGNIFICANT CHANGE UP (ref 30–65)
OPIATES UR-MCNC: NEGATIVE — SIGNIFICANT CHANGE UP
OPIATES UR-MCNC: NEGATIVE — SIGNIFICANT CHANGE UP
OXYCODONE UR-MCNC: NEGATIVE — SIGNIFICANT CHANGE UP
OXYCODONE UR-MCNC: NEGATIVE — SIGNIFICANT CHANGE UP
PCO2 BLDMV: 36 MMHG — SIGNIFICANT CHANGE UP (ref 30–65)
PCO2 BLDMV: 39 MMHG — SIGNIFICANT CHANGE UP (ref 30–65)
PCO2 BLDMV: 40 MMHG — SIGNIFICANT CHANGE UP (ref 30–65)
PCO2 BLDMV: 40 MMHG — SIGNIFICANT CHANGE UP (ref 30–65)
PCO2 BLDMV: 41 MMHG — SIGNIFICANT CHANGE UP (ref 30–65)
PCO2 BLDMV: 41 MMHG — SIGNIFICANT CHANGE UP (ref 30–65)
PCP SPEC-MCNC: SIGNIFICANT CHANGE UP
PCP SPEC-MCNC: SIGNIFICANT CHANGE UP
PCP UR-MCNC: NEGATIVE — SIGNIFICANT CHANGE UP
PCP UR-MCNC: NEGATIVE — SIGNIFICANT CHANGE UP
PH BLDMV: 7.31 — LOW (ref 7.32–7.45)
PH BLDMV: 7.32 — SIGNIFICANT CHANGE UP (ref 7.32–7.45)
PH BLDMV: 7.32 — SIGNIFICANT CHANGE UP (ref 7.32–7.45)
PH BLDMV: 7.37 — SIGNIFICANT CHANGE UP (ref 7.32–7.45)
PH BLDMV: 7.37 — SIGNIFICANT CHANGE UP (ref 7.32–7.45)
PH BLDMV: 7.4 — SIGNIFICANT CHANGE UP (ref 7.32–7.45)
PH BLDMV: 7.4 — SIGNIFICANT CHANGE UP (ref 7.32–7.45)
PH UR: 5 — SIGNIFICANT CHANGE UP (ref 5–8)
PH UR: 5 — SIGNIFICANT CHANGE UP (ref 5–8)
PHOSPHATE SERPL-MCNC: 5.4 MG/DL — HIGH (ref 2.5–4.5)
PHOSPHATE SERPL-MCNC: 5.4 MG/DL — HIGH (ref 2.5–4.5)
PHOSPHATE SERPL-MCNC: 5.8 MG/DL — HIGH (ref 2.5–4.5)
PLATELET # BLD AUTO: 266 K/UL — SIGNIFICANT CHANGE UP (ref 150–400)
PLATELET # BLD AUTO: 266 K/UL — SIGNIFICANT CHANGE UP (ref 150–400)
PLATELET # BLD AUTO: 270 K/UL — SIGNIFICANT CHANGE UP (ref 150–400)
PLATELET # BLD AUTO: 270 K/UL — SIGNIFICANT CHANGE UP (ref 150–400)
POTASSIUM SERPL-MCNC: 3.6 MMOL/L — SIGNIFICANT CHANGE UP (ref 3.5–5.3)
POTASSIUM SERPL-MCNC: 3.6 MMOL/L — SIGNIFICANT CHANGE UP (ref 3.5–5.3)
POTASSIUM SERPL-MCNC: 3.8 MMOL/L — SIGNIFICANT CHANGE UP (ref 3.5–5.3)
POTASSIUM SERPL-MCNC: 3.8 MMOL/L — SIGNIFICANT CHANGE UP (ref 3.5–5.3)
POTASSIUM SERPL-MCNC: 4 MMOL/L — SIGNIFICANT CHANGE UP (ref 3.5–5.3)
POTASSIUM SERPL-MCNC: 4 MMOL/L — SIGNIFICANT CHANGE UP (ref 3.5–5.3)
POTASSIUM SERPL-SCNC: 3.6 MMOL/L — SIGNIFICANT CHANGE UP (ref 3.5–5.3)
POTASSIUM SERPL-SCNC: 3.6 MMOL/L — SIGNIFICANT CHANGE UP (ref 3.5–5.3)
POTASSIUM SERPL-SCNC: 3.8 MMOL/L — SIGNIFICANT CHANGE UP (ref 3.5–5.3)
POTASSIUM SERPL-SCNC: 3.8 MMOL/L — SIGNIFICANT CHANGE UP (ref 3.5–5.3)
POTASSIUM SERPL-SCNC: 4 MMOL/L — SIGNIFICANT CHANGE UP (ref 3.5–5.3)
POTASSIUM SERPL-SCNC: 4 MMOL/L — SIGNIFICANT CHANGE UP (ref 3.5–5.3)
PROCALCITONIN SERPL-MCNC: 4.24 NG/ML — HIGH (ref 0.02–0.1)
PROCALCITONIN SERPL-MCNC: 4.24 NG/ML — HIGH (ref 0.02–0.1)
PROT SERPL-MCNC: 6.7 G/DL — SIGNIFICANT CHANGE UP (ref 6–8.3)
PROT SERPL-MCNC: 6.7 G/DL — SIGNIFICANT CHANGE UP (ref 6–8.3)
PROT SERPL-MCNC: 6.8 G/DL — SIGNIFICANT CHANGE UP (ref 6–8.3)
PROT SERPL-MCNC: 6.8 G/DL — SIGNIFICANT CHANGE UP (ref 6–8.3)
PROT SERPL-MCNC: 7.2 G/DL — SIGNIFICANT CHANGE UP (ref 6–8.3)
PROT SERPL-MCNC: 7.2 G/DL — SIGNIFICANT CHANGE UP (ref 6–8.3)
PROT SERPL-MCNC: 7.4 G/DL — SIGNIFICANT CHANGE UP (ref 6–8.3)
PROT SERPL-MCNC: 7.4 G/DL — SIGNIFICANT CHANGE UP (ref 6–8.3)
PROT UR-MCNC: ABNORMAL
PROT UR-MCNC: ABNORMAL
PROTHROM AB SERPL-ACNC: 14.1 SEC — HIGH (ref 9.5–13)
PROTHROM AB SERPL-ACNC: 14.1 SEC — HIGH (ref 9.5–13)
PROTHROM AB SERPL-ACNC: 14.7 SEC — HIGH (ref 9.5–13)
PROTHROM AB SERPL-ACNC: 14.7 SEC — HIGH (ref 9.5–13)
RAPID RVP RESULT: SIGNIFICANT CHANGE UP
RAPID RVP RESULT: SIGNIFICANT CHANGE UP
RBC # BLD: 3.87 M/UL — LOW (ref 4.2–5.8)
RBC # BLD: 3.87 M/UL — LOW (ref 4.2–5.8)
RBC # BLD: 4.22 M/UL — SIGNIFICANT CHANGE UP (ref 4.2–5.8)
RBC # BLD: 4.22 M/UL — SIGNIFICANT CHANGE UP (ref 4.2–5.8)
RBC # FLD: 15.1 % — HIGH (ref 10.3–14.5)
RBC # FLD: 15.1 % — HIGH (ref 10.3–14.5)
RBC # FLD: 15.6 % — HIGH (ref 10.3–14.5)
RBC # FLD: 15.6 % — HIGH (ref 10.3–14.5)
RBC CASTS # UR COMP ASSIST: 1 /HPF — SIGNIFICANT CHANGE UP (ref 0–4)
RBC CASTS # UR COMP ASSIST: 1 /HPF — SIGNIFICANT CHANGE UP (ref 0–4)
RH IG SCN BLD-IMP: POSITIVE — SIGNIFICANT CHANGE UP
RH IG SCN BLD-IMP: POSITIVE — SIGNIFICANT CHANGE UP
RHEUMATOID FACT SERPL-ACNC: 13 IU/ML — SIGNIFICANT CHANGE UP (ref 0–13)
RHEUMATOID FACT SERPL-ACNC: 13 IU/ML — SIGNIFICANT CHANGE UP (ref 0–13)
S AUREUS DNA NOSE QL NAA+PROBE: SIGNIFICANT CHANGE UP
S AUREUS DNA NOSE QL NAA+PROBE: SIGNIFICANT CHANGE UP
SAO2 % BLDMV: 49.3 — LOW (ref 60–90)
SAO2 % BLDMV: 49.3 — LOW (ref 60–90)
SAO2 % BLDMV: 49.9 — LOW (ref 60–90)
SAO2 % BLDMV: 49.9 — LOW (ref 60–90)
SAO2 % BLDMV: 56.5 — LOW (ref 60–90)
SAO2 % BLDMV: 56.5 — LOW (ref 60–90)
SAO2 % BLDMV: 61.8 — SIGNIFICANT CHANGE UP (ref 60–90)
SAO2 % BLDMV: 61.8 — SIGNIFICANT CHANGE UP (ref 60–90)
SAO2 % BLDMV: 75.7 — SIGNIFICANT CHANGE UP (ref 60–90)
SAO2 % BLDMV: 75.7 — SIGNIFICANT CHANGE UP (ref 60–90)
SAO2 % BLDMV: 79.6 — SIGNIFICANT CHANGE UP (ref 60–90)
SAO2 % BLDMV: 79.6 — SIGNIFICANT CHANGE UP (ref 60–90)
SARS-COV-2 IGG+IGM SERPL QL IA: 29.4 INDEX — HIGH
SARS-COV-2 IGG+IGM SERPL QL IA: 29.4 INDEX — HIGH
SARS-COV-2 IGG+IGM SERPL QL IA: >250 U/ML — HIGH
SARS-COV-2 IGG+IGM SERPL QL IA: >250 U/ML — HIGH
SARS-COV-2 IGG+IGM SERPL QL IA: POSITIVE
SARS-COV-2 RNA SPEC QL NAA+PROBE: SIGNIFICANT CHANGE UP
SARS-COV-2 RNA SPEC QL NAA+PROBE: SIGNIFICANT CHANGE UP
SODIUM SERPL-SCNC: 133 MMOL/L — LOW (ref 135–145)
SODIUM SERPL-SCNC: 135 MMOL/L — SIGNIFICANT CHANGE UP (ref 135–145)
SODIUM SERPL-SCNC: 135 MMOL/L — SIGNIFICANT CHANGE UP (ref 135–145)
SP GR SPEC: 1.02 — SIGNIFICANT CHANGE UP (ref 1–1.03)
SP GR SPEC: 1.02 — SIGNIFICANT CHANGE UP (ref 1–1.03)
T3FREE SERPL-MCNC: 1.65 PG/ML — LOW (ref 2–4.4)
T3FREE SERPL-MCNC: 1.65 PG/ML — LOW (ref 2–4.4)
T4 FREE SERPL-MCNC: 1.7 NG/DL — SIGNIFICANT CHANGE UP (ref 0.9–1.8)
T4 FREE SERPL-MCNC: 1.7 NG/DL — SIGNIFICANT CHANGE UP (ref 0.9–1.8)
THC UR QL: NEGATIVE — SIGNIFICANT CHANGE UP
THC UR QL: NEGATIVE — SIGNIFICANT CHANGE UP
TIBC SERPL-MCNC: 234 UG/DL — SIGNIFICANT CHANGE UP (ref 220–430)
TIBC SERPL-MCNC: 234 UG/DL — SIGNIFICANT CHANGE UP (ref 220–430)
TRIGL SERPL-MCNC: 95 MG/DL — SIGNIFICANT CHANGE UP
TRIGL SERPL-MCNC: 95 MG/DL — SIGNIFICANT CHANGE UP
TSH SERPL-MCNC: 0.38 UIU/ML — SIGNIFICANT CHANGE UP (ref 0.27–4.2)
TSH SERPL-MCNC: 0.38 UIU/ML — SIGNIFICANT CHANGE UP (ref 0.27–4.2)
UIBC SERPL-MCNC: 187 UG/DL — SIGNIFICANT CHANGE UP (ref 110–370)
UIBC SERPL-MCNC: 187 UG/DL — SIGNIFICANT CHANGE UP (ref 110–370)
URATE CRY FLD QL MICRO: PRESENT
URATE CRY FLD QL MICRO: PRESENT
URATE SERPL-MCNC: 8.9 MG/DL — HIGH (ref 3.4–8.8)
URATE SERPL-MCNC: 8.9 MG/DL — HIGH (ref 3.4–8.8)
UROBILINOGEN FLD QL: SIGNIFICANT CHANGE UP
UROBILINOGEN FLD QL: SIGNIFICANT CHANGE UP
WBC # BLD: 11.47 K/UL — HIGH (ref 3.8–10.5)
WBC # BLD: 11.47 K/UL — HIGH (ref 3.8–10.5)
WBC # BLD: 14.01 K/UL — HIGH (ref 3.8–10.5)
WBC # BLD: 14.01 K/UL — HIGH (ref 3.8–10.5)
WBC # FLD AUTO: 11.47 K/UL — HIGH (ref 3.8–10.5)
WBC # FLD AUTO: 11.47 K/UL — HIGH (ref 3.8–10.5)
WBC # FLD AUTO: 14.01 K/UL — HIGH (ref 3.8–10.5)
WBC # FLD AUTO: 14.01 K/UL — HIGH (ref 3.8–10.5)
WBC UR QL: 1 /HPF — SIGNIFICANT CHANGE UP (ref 0–5)
WBC UR QL: 1 /HPF — SIGNIFICANT CHANGE UP (ref 0–5)

## 2023-11-10 PROCEDURE — 99292 CRITICAL CARE ADDL 30 MIN: CPT

## 2023-11-10 PROCEDURE — 99291 CRITICAL CARE FIRST HOUR: CPT | Mod: 25

## 2023-11-10 PROCEDURE — 71045 X-RAY EXAM CHEST 1 VIEW: CPT | Mod: 26

## 2023-11-10 PROCEDURE — 99233 SBSQ HOSP IP/OBS HIGH 50: CPT

## 2023-11-10 PROCEDURE — 76700 US EXAM ABDOM COMPLETE: CPT | Mod: 26

## 2023-11-10 PROCEDURE — 93010 ELECTROCARDIOGRAM REPORT: CPT | Mod: 76

## 2023-11-10 PROCEDURE — 93978 VASCULAR STUDY: CPT | Mod: 26

## 2023-11-10 RX ORDER — ACETAMINOPHEN 500 MG
1000 TABLET ORAL ONCE
Refills: 0 | Status: COMPLETED | OUTPATIENT
Start: 2023-11-10 | End: 2023-11-10

## 2023-11-10 RX ORDER — LIDOCAINE 4 G/100G
1 CREAM TOPICAL EVERY 24 HOURS
Refills: 0 | Status: DISCONTINUED | OUTPATIENT
Start: 2023-11-10 | End: 2023-11-17

## 2023-11-10 RX ORDER — HYDRALAZINE HCL 50 MG
25 TABLET ORAL THREE TIMES A DAY
Refills: 0 | Status: DISCONTINUED | OUTPATIENT
Start: 2023-11-10 | End: 2023-11-10

## 2023-11-10 RX ORDER — VASOPRESSIN 20 [USP'U]/ML
0.06 INJECTION INTRAVENOUS
Qty: 40 | Refills: 0 | Status: DISCONTINUED | OUTPATIENT
Start: 2023-11-10 | End: 2023-11-10

## 2023-11-10 RX ORDER — HYDRALAZINE HCL 50 MG
25 TABLET ORAL ONCE
Refills: 0 | Status: COMPLETED | OUTPATIENT
Start: 2023-11-10 | End: 2023-11-10

## 2023-11-10 RX ORDER — HYDRALAZINE HCL 50 MG
50 TABLET ORAL THREE TIMES A DAY
Refills: 0 | Status: COMPLETED | OUTPATIENT
Start: 2023-11-11 | End: 2023-11-11

## 2023-11-10 RX ORDER — BUMETANIDE 0.25 MG/ML
0.5 INJECTION INTRAMUSCULAR; INTRAVENOUS
Qty: 20 | Refills: 0 | Status: DISCONTINUED | OUTPATIENT
Start: 2023-11-10 | End: 2023-11-10

## 2023-11-10 RX ORDER — ISOSORBIDE DINITRATE 5 MG/1
10 TABLET ORAL THREE TIMES A DAY
Refills: 0 | Status: DISCONTINUED | OUTPATIENT
Start: 2023-11-10 | End: 2023-11-11

## 2023-11-10 RX ORDER — HYDROMORPHONE HYDROCHLORIDE 2 MG/ML
0.5 INJECTION INTRAMUSCULAR; INTRAVENOUS; SUBCUTANEOUS ONCE
Refills: 0 | Status: DISCONTINUED | OUTPATIENT
Start: 2023-11-10 | End: 2023-11-10

## 2023-11-10 RX ORDER — BUMETANIDE 0.25 MG/ML
2 INJECTION INTRAMUSCULAR; INTRAVENOUS ONCE
Refills: 0 | Status: COMPLETED | OUTPATIENT
Start: 2023-11-10 | End: 2023-11-10

## 2023-11-10 RX ORDER — MILRINONE LACTATE 1 MG/ML
0.12 INJECTION, SOLUTION INTRAVENOUS
Qty: 20 | Refills: 0 | Status: DISCONTINUED | OUTPATIENT
Start: 2023-11-10 | End: 2023-11-11

## 2023-11-10 RX ORDER — POTASSIUM CHLORIDE 20 MEQ
40 PACKET (EA) ORAL ONCE
Refills: 0 | Status: DISCONTINUED | OUTPATIENT
Start: 2023-11-10 | End: 2023-11-10

## 2023-11-10 RX ORDER — POTASSIUM CHLORIDE 20 MEQ
20 PACKET (EA) ORAL
Refills: 0 | Status: COMPLETED | OUTPATIENT
Start: 2023-11-10 | End: 2023-11-10

## 2023-11-10 RX ADMIN — LIDOCAINE 1 PATCH: 4 CREAM TOPICAL at 11:45

## 2023-11-10 RX ADMIN — VASOPRESSIN 9 UNIT(S)/MIN: 20 INJECTION INTRAVENOUS at 06:20

## 2023-11-10 RX ADMIN — HEPARIN SODIUM 15 UNIT(S)/HR: 5000 INJECTION INTRAVENOUS; SUBCUTANEOUS at 18:30

## 2023-11-10 RX ADMIN — DEXMEDETOMIDINE HYDROCHLORIDE IN 0.9% SODIUM CHLORIDE 12.4 MICROGRAM(S)/KG/HR: 4 INJECTION INTRAVENOUS at 07:52

## 2023-11-10 RX ADMIN — Medication 50 MILLIEQUIVALENT(S): at 18:29

## 2023-11-10 RX ADMIN — BUMETANIDE 5 MG/HR: 0.25 INJECTION INTRAMUSCULAR; INTRAVENOUS at 13:10

## 2023-11-10 RX ADMIN — HEPARIN SODIUM 14.5 UNIT(S)/HR: 5000 INJECTION INTRAVENOUS; SUBCUTANEOUS at 10:11

## 2023-11-10 RX ADMIN — Medication 400 MILLIGRAM(S): at 17:05

## 2023-11-10 RX ADMIN — TICAGRELOR 90 MILLIGRAM(S): 90 TABLET ORAL at 17:23

## 2023-11-10 RX ADMIN — Medication 1000 MILLIGRAM(S): at 17:35

## 2023-11-10 RX ADMIN — LIDOCAINE 1 PATCH: 4 CREAM TOPICAL at 20:52

## 2023-11-10 RX ADMIN — TICAGRELOR 90 MILLIGRAM(S): 90 TABLET ORAL at 06:19

## 2023-11-10 RX ADMIN — MILRINONE LACTATE 3.71 MICROGRAM(S)/KG/MIN: 1 INJECTION, SOLUTION INTRAVENOUS at 20:10

## 2023-11-10 RX ADMIN — MILRINONE LACTATE 3.71 MICROGRAM(S)/KG/MIN: 1 INJECTION, SOLUTION INTRAVENOUS at 18:30

## 2023-11-10 RX ADMIN — Medication 15 MG/MIN: at 07:46

## 2023-11-10 RX ADMIN — Medication 81 MILLIGRAM(S): at 17:22

## 2023-11-10 RX ADMIN — INSULIN HUMAN 3 UNIT(S)/HR: 100 INJECTION, SOLUTION SUBCUTANEOUS at 20:11

## 2023-11-10 RX ADMIN — ATORVASTATIN CALCIUM 80 MILLIGRAM(S): 80 TABLET, FILM COATED ORAL at 21:02

## 2023-11-10 RX ADMIN — ISOSORBIDE DINITRATE 10 MILLIGRAM(S): 5 TABLET ORAL at 17:23

## 2023-11-10 RX ADMIN — CHLORHEXIDINE GLUCONATE 1 APPLICATION(S): 213 SOLUTION TOPICAL at 23:20

## 2023-11-10 RX ADMIN — Medication 25 MILLIGRAM(S): at 21:02

## 2023-11-10 RX ADMIN — SPIRONOLACTONE 25 MILLIGRAM(S): 25 TABLET, FILM COATED ORAL at 06:19

## 2023-11-10 RX ADMIN — Medication 25 MILLIGRAM(S): at 22:15

## 2023-11-10 RX ADMIN — AMIODARONE HYDROCHLORIDE 16.7 MG/MIN: 400 TABLET ORAL at 14:39

## 2023-11-10 RX ADMIN — PANTOPRAZOLE SODIUM 40 MILLIGRAM(S): 20 TABLET, DELAYED RELEASE ORAL at 11:47

## 2023-11-10 RX ADMIN — BUMETANIDE 2.5 MG/HR: 0.25 INJECTION INTRAMUSCULAR; INTRAVENOUS at 14:39

## 2023-11-10 RX ADMIN — Medication 50 MILLIEQUIVALENT(S): at 21:03

## 2023-11-10 RX ADMIN — MILRINONE LACTATE 3.71 MICROGRAM(S)/KG/MIN: 1 INJECTION, SOLUTION INTRAVENOUS at 06:20

## 2023-11-10 RX ADMIN — LIDOCAINE 1 PATCH: 4 CREAM TOPICAL at 23:00

## 2023-11-10 RX ADMIN — BUMETANIDE 2 MILLIGRAM(S): 0.25 INJECTION INTRAMUSCULAR; INTRAVENOUS at 10:31

## 2023-11-10 RX ADMIN — Medication 7.5 MG/MIN: at 20:10

## 2023-11-10 RX ADMIN — Medication 15 MG/MIN: at 10:31

## 2023-11-10 RX ADMIN — Medication 80 MILLIGRAM(S): at 06:20

## 2023-11-10 RX ADMIN — Medication 400 MILLIGRAM(S): at 10:06

## 2023-11-10 RX ADMIN — MILRINONE LACTATE 3.71 MICROGRAM(S)/KG/MIN: 1 INJECTION, SOLUTION INTRAVENOUS at 07:47

## 2023-11-10 RX ADMIN — Medication 25 MILLIGRAM(S): at 17:23

## 2023-11-10 RX ADMIN — Medication 1000 MILLIGRAM(S): at 10:36

## 2023-11-10 RX ADMIN — INSULIN HUMAN 3 UNIT(S)/HR: 100 INJECTION, SOLUTION SUBCUTANEOUS at 13:09

## 2023-11-10 RX ADMIN — AMIODARONE HYDROCHLORIDE 16.7 MG/MIN: 400 TABLET ORAL at 13:09

## 2023-11-10 NOTE — PROGRESS NOTE ADULT - PROBLEM SELECTOR PLAN 3
- pLAD 70 % stenosis in the ostium portion of the segment and 100 % in-stent restenosis. mRCA, 100 % stenosis.   - Currently ASA, Atorvastatin 80mg and Brilinta  - Pending viability study, but poor coronary perfusion even when LAD was patent. Will need to be 7-10 days post MI to prevent FP with LGE (can light up if there is edema and be mistaken for scar)  - Pt may not need viability as unlikely to  if he is still having ischemic induced arrythmias. Also pt high risk for decompensation as he is still having ectopy on Amio/Lido/Propofol so will reach out to EP for possible ablation and discuss risk benefit of PCI.

## 2023-11-10 NOTE — PROGRESS NOTE ADULT - ASSESSMENT
58M with HTN, CAD (s/p PCI 2008), ICH 2008, HFrEF (EF severely reduced 2018) presenting with chest pressure on 11/1. Prior to cath was intubated 2/2 AHRF, s/p LHC showed pLAD 70 % stenosis in the ostial portion of the segment and 100 % in-stent restenosis, 100 % stenosis mRCA and RHC revealing elevated filling pressures and marginal perfusion indices for which IABP was inserted. He was admitted to CICU, extubated to nasal canula on 11/3, IABP weaned and discontinued 11/7. While in CICU he was diuresed and weaned off inotropes, intiated on PO GDMT, was downgraded to the floor on 11/8. Of note, he was pending cardiac viability study; however, had been deferred for CABG by CTS. On 11/8 he had witnessed seizure activity by his family and lost pulse. Code blue called for PEA arrest, telemetry reveals VT/VF. ROSC achieved after shock delivered x3, Lidocaine x1, Amio x1 and Mg x1 given. EP consulted for VT/VF arrest.     1) HFrEF, 32% w/ WMA on milrinone with IABP   2) CAD   3) cardiogenic shock   4) VT/VF arrest    - Extubated this AM can wean down lido gtt to 1, remains on amio gtt at 0.5  - Pending viabilty study, HF following for GDMT/? AT eval  - Will need secondary prevention ICD prior to discharge  - Keep Mg > 2 and K > 4    Note not final until signed by attending       Rosa Carreon MD  Cardiology Fellow PGY-6  Phone: [phone]    For all New Consults  [email or web address]   Login: [place]

## 2023-11-10 NOTE — PROGRESS NOTE ADULT - ASSESSMENT
59 yo male h/o htn, cad s/p pci, ICH, here with NSTEMI  s/p intubation and cath. now in CCU    NSTEMI  s/p  cath  cath results noted. multi vessel dz., CTSx f/u.   hep gtt  pt with vtach/fib arrest again on 11/8. s/p ACLS and ROSC.   now extubated  IABP in place  mngt as per CCU  discussion regarding transplant ongoing    LV thrombus   hep gtt    acute on chronic systolic heart failure  heart failure team f/u      pna  recently diagnosed outpt  iv abx now stopped  f/u cult   id following     covid  supportive care    h/o ICH  resolved    agitation  psy consult f/u    mngt as per CCU team  d/w CCU attn    d/w pts pmd        Advanced care planning was discussed with patient and family.  Advanced care planning forms were reviewed and discussed as appropriate.  Differential diagnosis and plan of care discussed with patient after the evaluation.   Pain assessed and judicious use of narcotics when appropriate was discussed.  Importance of Fall prevention discussed.  Counseling on Smoking and Alcohol cessation was offered when appropriate.  Counseling on Diet, exercise, and medication compliance was done.       Approx 75 minutes spent.

## 2023-11-10 NOTE — PROGRESS NOTE ADULT - ATTENDING COMMENTS
55 yo M with known CAD and HFrEF (TTE 2018 showed low LVEF with anteroseptal akinesis) who presented with COVID infection, NSTEMI and CS.  RHC showed biventricular congestion and low CI. LHC showed occluded LAD and severe diffuse RCA disease (filling via L-R collaterals)  Improved with IABP and afterload reduction.     Initially failed IABP wean with hypotension, reduction of CI and worsening Cr, LFTs and Na.  On 11/6: RA~13, PAd~27 and CI~3 on IABP 1:1 and Nipride @2mcg/kg/min     After diuresis, he was weaned successfully without inotropes  Cr was 1.4, baseline 1.1  AST/ALT were improving close to normal now   CVP~12-13 on 11/7 - Cordis then removed    Overnight 11/8 had PMVT cardiac arrest with prompt CPR and defibrillation  Returned to CICU intubated and sedated on Lido and Amio gtt with persistent ectopy  Discussion held about IABP overnight but IC felt patient would be ok without it  PAC placed: CVP-12, PAP-60/30, CI>2.5LPM/m2 on no inotropes     Overnight 11/9 developed worsening CI and hypotension requiring addition of Milrinone   Now has ARIC of unclear etiology with oliguria - possible ATN  Telemetry now shows very minimal ectopy    - Patient agrees to AT work-up, will launch  - HOLD Spironolactone and vasodilators  - Continue IABP   - Continue Amio and Lido gtt   - Continue diuresis for goal CVP <9  - Continue ASA and Ticagrelor    *Please keep primary Dr. Banuelos [phone] in the loop per family request    Erlin Quijano MD

## 2023-11-10 NOTE — PROGRESS NOTE ADULT - SUBJECTIVE AND OBJECTIVE BOX
LD VALENCIA  58y Male  MRN:[id number]    Patient is a 58y old  Male who presents with a chief complaint of NSTEMI (01 Nov 2023 20:29)    HPI:  57yo M w/ hx HTN, CAD w/ 1 stent in 2009, ICH (2008) presenting with abn ekg. Patient presented to [facility] where he was found to have STEMI, recommended to get cath however patient did not want to get it there so it left and came here.  Patient initially had cough, congestion, fever, was placed on antibiotics on Sunday.  Started feeling nauseous and had a presyncopal event after which he presented to ED last night.  Had chest pain as well.  Chest pain is midsternal.  Not currently having chest pain.  Received 4 aspirin 30 min pta. (01 Nov 2023 15:11)      Patient seen and evaluated at bedside in CCU. interval events noted    Interval HPI: extubated this am     PAST MEDICAL & SURGICAL HISTORY:  HTN (hypertension)      CAD (coronary artery disease)  2009; stent      Intracranial hemorrhage  2008      Respiratory arrest  december 1st      Myocardial infarction, unspecified MI type, unspecified artery      History of coronary artery stent placement          REVIEW OF SYSTEMS:  as per hpi     VITALS:   ICU Vital Signs Last 24 Hrs  T(C): 38 (10 Nov 2023 08:00), Max: 38.2 (09 Nov 2023 20:00)  T(F): 100.4 (10 Nov 2023 08:00), Max: 100.8 (09 Nov 2023 20:00)  HR: 98 (10 Nov 2023 11:00) (80 - 98)  BP: --  BP(mean): --  ABP: 105/64 (10 Nov 2023 11:00) (65/42 - 122/76)  ABP(mean): 86 (10 Nov 2023 11:00) (56 - 101)  RR: 17 (10 Nov 2023 11:00) (15 - 31)  SpO2: 95% (10 Nov 2023 11:00) (92% - 96%)    O2 Parameters below as of 10 Nov 2023 11:00  Patient On (Oxygen Delivery Method): nasal cannula, high flow  O2 Flow (L/min): 40  O2 Concentration (%): 40             PHYSICAL EXAM:  GENERAL: NAD  HEAD:  Atraumatic, Normocephalic  EYES: EOMI, PERRLA, conjunctiva and sclera clear  NECK: Supple, No JVD  CHEST/LUNG: Clear to auscultation bilaterally; No wheeze  HEART: Regular rate and rhythm; No murmurs, rubs, or gallops  ABDOMEN: Soft, Nontender, Nondistended; Bowel sounds present  EXTREMITIES:  2+ Peripheral Pulses, No clubbing, cyanosis, or edema  NEUROLOGY: nonfocal  SKIN: No rashes or lesions    Consultant(s) Notes Reviewed:  [x ] YES  [ ] NO  Care Discussed with Consultants/Other Providers [ x] YES  [ ] NO    MEDS:   MEDICATIONS  (STANDING):  aMIOdarone Infusion 0.5 mG/Min (16.7 mL/Hr) IV Continuous <Continuous>  aspirin  chewable 81 milliGRAM(s) Oral daily  atorvastatin 80 milliGRAM(s) Oral at bedtime  budesonide  80 MICROgram(s)/formoterol 4.5 MICROgram(s) Inhaler 2 Puff(s) Inhalation two times a day  buMETAnide Infusion 1 mG/Hr (5 mL/Hr) IV Continuous <Continuous>  chlorhexidine 2% Cloths 1 Application(s) Topical daily  dexMEDEtomidine Infusion 0.5 MICROgram(s)/kG/Hr (12.4 mL/Hr) IV Continuous <Continuous>  dextrose 5% + sodium chloride 0.9%. 1000 milliLiter(s) (35 mL/Hr) IV Continuous <Continuous>  dextrose 50% Injectable 25 Gram(s) IV Push once  dextrose 50% Injectable 12.5 Gram(s) IV Push once  furosemide   Injectable 80 milliGRAM(s) IV Push daily  glucagon  Injectable 1 milliGRAM(s) IntraMuscular once  heparin  Infusion 1600 Unit(s)/Hr (14.5 mL/Hr) IV Continuous <Continuous>  hydrALAZINE 25 milliGRAM(s) Oral three times a day  insulin regular Infusion 3 Unit(s)/Hr (3 mL/Hr) IV Continuous <Continuous>  isosorbide   dinitrate Tablet (ISORDIL) 10 milliGRAM(s) Oral three times a day  lidocaine   4% Patch 1 Patch Transdermal every 24 hours  lidocaine   Infusion 2 mG/Min (15 mL/Hr) IV Continuous <Continuous>  milrinone Infusion 0.125 MICROgram(s)/kG/Min (3.71 mL/Hr) IV Continuous <Continuous>  pantoprazole  Injectable 40 milliGRAM(s) IV Push daily  spironolactone 25 milliGRAM(s) Oral daily  ticagrelor 90 milliGRAM(s) Oral every 12 hours  tiotropium 2.5 MICROgram(s) Inhaler 2 Puff(s) Inhalation daily  vasopressin Infusion 0.06 Unit(s)/Min (9 mL/Hr) IV Continuous <Continuous>    MEDICATIONS  (PRN):  albuterol/ipratropium for Nebulization 3 milliLiter(s) Nebulizer every 6 hours PRN Shortness of Breath and/or Wheezing      ALLERGIES:  penicillins (Unknown)      LABS:                                                                                                                   11.2   11.47 )-----------( 266      ( 10 Nov 2023 02:10 )             33.0   11-10    133<L>  |  100  |  51<H>  ----------------------------<  112<H>  3.8   |  15<L>  |  4.03<H>    Ca    9.1      10 Nov 2023 08:52  Phos  5.8     11-10  Mg     2.9     11-10    TPro  7.2  /  Alb  3.2<L>  /  TBili  0.7  /  DBili  x   /  AST  113<H>  /  ALT  162<H>  /  AlkPhos  72  11-10       < from: Xray Chest 1 View- PORTABLE-Urgent (11.01.23 @ 07:42) >    IMPRESSION:  Clear lungs.    ---End of Report ---        < end of copied text >  < from: TTE W or WO Ultrasound Enhancing Agent (11.01.23 @ 10:23) >  _____________________________     CONCLUSIONS:      1. Left ventricular cavity is moderately dilated. Left ventricular wall thickness is normal. Left ventricular systolic function is severely decreased with an ejection fraction of 32 % by Chinchilla's method of disks. Regional wall motion abnormalities present.   2. Multiple segmental abnormalities exist. See findings.   3. There is moderate (grade 2) left ventricular diastolic dysfunction, with indeterminant filling pressure.   4. Normal right ventricular cavity size, wall thickness, and systolic function.   5. No significant valvular disease.   6. No pericardial effusion seen.   7. Compared to the transthoracic echocardiogram performed on 1/25/2017 the areas of akinesis are unchanged but there has been a decline in LV systolic function with new areas of hypokinesis.    __________________________________________________________________    < end of copied text >  < from: TTE Limited W or WO Ultrasound Enhancing Agent (11.02.23 @ 07:41) >  __________________________     CONCLUSIONS:      1. After obtaining consent, Definity ultrasound enhancing agent was given for enhanced left ventricular opacification and improved delineation of the left ventricular endocardial borders. Left ventricular systolic function is severely decreased with a calculated ejection fraction of 22 % by the Chinchilla's biplane method of disks. There is a left ventricular thrombus.   2. Findings were discussed with Litzy BOSS on 11/2/2023 at 8.49am.   3. There is a left ventricular thrombus.    _________________________________________________________________    < end of copied text >

## 2023-11-10 NOTE — PROGRESS NOTE ADULT - SUBJECTIVE AND OBJECTIVE BOX
Patient seen and examined at bedside.    Overnight Events: Extubated this AM, on HFNC, spoke to pt and daughter, will open AT Eval    Review of Systems: Negative except as per HPI     Current Meds:  albuterol/ipratropium for Nebulization 3 milliLiter(s) Nebulizer every 6 hours PRN  aMIOdarone Infusion 0.5 mG/Min IV Continuous <Continuous>  aspirin  chewable 81 milliGRAM(s) Oral daily  atorvastatin 80 milliGRAM(s) Oral at bedtime  budesonide  80 MICROgram(s)/formoterol 4.5 MICROgram(s) Inhaler 2 Puff(s) Inhalation two times a day  buMETAnide Infusion 0.5 mG/Hr IV Continuous <Continuous>  chlorhexidine 2% Cloths 1 Application(s) Topical daily  dextrose 5% + sodium chloride 0.9%. 1000 milliLiter(s) IV Continuous <Continuous>  dextrose 50% Injectable 25 Gram(s) IV Push once  dextrose 50% Injectable 12.5 Gram(s) IV Push once  glucagon  Injectable 1 milliGRAM(s) IntraMuscular once  heparin  Infusion 1600 Unit(s)/Hr IV Continuous <Continuous>  hydrALAZINE 25 milliGRAM(s) Oral three times a day  HYDROmorphone  Injectable 0.5 milliGRAM(s) IV Push once  insulin regular Infusion 3 Unit(s)/Hr IV Continuous <Continuous>  isosorbide   dinitrate Tablet (ISORDIL) 10 milliGRAM(s) Oral three times a day  lidocaine   4% Patch 1 Patch Transdermal every 24 hours  lidocaine   Infusion 2 mG/Min IV Continuous <Continuous>  milrinone Infusion 0.125 MICROgram(s)/kG/Min IV Continuous <Continuous>  pantoprazole  Injectable 40 milliGRAM(s) IV Push daily  spironolactone 25 milliGRAM(s) Oral daily  ticagrelor 90 milliGRAM(s) Oral every 12 hours  tiotropium 2.5 MICROgram(s) Inhaler 2 Puff(s) Inhalation daily      Vitals:  T(F): 100 (11-10), Max: 100.8 (11-09)  HR: 99 (11-10) (80 - 99)  BP: --  RR: 16 (11-10)  SpO2: 96% (11-10)  I&O's Summary    09 Nov 2023 07:01  -  10 Nov 2023 07:00  --------------------------------------------------------  IN: 2257.6 mL / OUT: 1685 mL / NET: 572.6 mL    10 Nov 2023 07:01  -  10 Nov 2023 15:55  --------------------------------------------------------  IN: 751.1 mL / OUT: 1175 mL / NET: -423.9 mL        Physical Exam:  Appearance: ill appearing, on HFNC   Eyes: PERRL, EOMI, pink conjunctiva  HEENT: Normal oral mucosa  Cardiovascular: RRR, S1, S2, no murmurs, rubs, or gallops; no edema; no JVD  Respiratory: Mech breath sounds,  Gastrointestinal: soft, non-tender, non-distended with normal bowel sounds  Musculoskeletal: No clubbing; no joint deformity, IABP  insertion c/d/i   Neurologic: Non-focal  Lymphatic: No lymphadenopathy  Psychiatry: AAOx3, mood & affect appropriate  Skin: No rashes, ecchymoses, or cyanosis                          11.2   11.47 )-----------( 266      ( 10 Nov 2023 02:10 )             33.0     11-10    133<L>  |  100  |  51<H>  ----------------------------<  112<H>  3.8   |  15<L>  |  4.03<H>    Ca    9.1      10 Nov 2023 08:52  Phos  5.8     11-10  Mg     2.9     11-10    TPro  7.2  /  Alb  3.2<L>  /  TBili  0.7  /  DBili  x   /  AST  113<H>  /  ALT  162<H>  /  AlkPhos  72  11-10    PT/INR - ( 10 Nov 2023 02:10 )   PT: 14.1 sec;   INR: 1.29 ratio    PTT - ( 10 Nov 2023 08:52 )  PTT:86.9 sec

## 2023-11-10 NOTE — PROGRESS NOTE ADULT - SUBJECTIVE AND OBJECTIVE BOX
Patient seen and examined at bedside.    Overnight Events:   - Being extubated this AM  - On tele NSR 90s-100s    Current Meds:  acetaminophen   IVPB .. 1000 milliGRAM(s) IV Intermittent once  albuterol/ipratropium for Nebulization 3 milliLiter(s) Nebulizer every 6 hours PRN  aMIOdarone Infusion 0.5 mG/Min IV Continuous <Continuous>  aspirin  chewable 81 milliGRAM(s) Oral daily  atorvastatin 80 milliGRAM(s) Oral at bedtime  budesonide  80 MICROgram(s)/formoterol 4.5 MICROgram(s) Inhaler 2 Puff(s) Inhalation two times a day  buMETAnide Infusion 1 mG/Hr IV Continuous <Continuous>  buMETAnide Injectable 2 milliGRAM(s) IV Push once  chlorhexidine 2% Cloths 1 Application(s) Topical daily  dexMEDEtomidine Infusion 0.5 MICROgram(s)/kG/Hr IV Continuous <Continuous>  dextrose 5% + sodium chloride 0.9%. 1000 milliLiter(s) IV Continuous <Continuous>  dextrose 50% Injectable 25 Gram(s) IV Push once  dextrose 50% Injectable 12.5 Gram(s) IV Push once  furosemide   Injectable 80 milliGRAM(s) IV Push daily  glucagon  Injectable 1 milliGRAM(s) IntraMuscular once  heparin  Infusion 1600 Unit(s)/Hr IV Continuous <Continuous>  hydrALAZINE 25 milliGRAM(s) Oral three times a day  insulin regular Infusion 3 Unit(s)/Hr IV Continuous <Continuous>  isosorbide   dinitrate Tablet (ISORDIL) 10 milliGRAM(s) Oral three times a day  lidocaine   4% Patch 1 Patch Transdermal every 24 hours  lidocaine   Infusion 2 mG/Min IV Continuous <Continuous>  milrinone Infusion 0.125 MICROgram(s)/kG/Min IV Continuous <Continuous>  pantoprazole  Injectable 40 milliGRAM(s) IV Push daily  spironolactone 25 milliGRAM(s) Oral daily  ticagrelor 90 milliGRAM(s) Oral every 12 hours  tiotropium 2.5 MICROgram(s) Inhaler 2 Puff(s) Inhalation daily  vasopressin Infusion 0.06 Unit(s)/Min IV Continuous <Continuous>      Vitals:  T(F): 100.4 (11-10), Max: 100.8 (11-09)  HR: 97 (11-10) (80 - 97)  BP: --  RR: 21 (11-10)  SpO2: 94% (11-10)  I&O's Summary    09 Nov 2023 07:01  -  10 Nov 2023 07:00  --------------------------------------------------------  IN: 2257.6 mL / OUT: 1485 mL / NET: 772.6 mL    10 Nov 2023 07:01  -  10 Nov 2023 10:08  --------------------------------------------------------  IN: 199.6 mL / OUT: 0 mL / NET: 199.6 mL        Physical Exam:  Appearance: Intubated  HEENT:   NC/AT	  Cardiovascular: +S1S2 RRR no m/g/r  Respiratory: CTA B/L	  Gastrointestinal:  Soft, NT.ND., + BS	  Skin: No rashes, masses or lesions  Neurologic: Non-focal, grossly  Extremities: No edema BLE +IABP  Vascular: Peripheral pulses palpable 2+ bilaterally                            11.2   11.47 )-----------( 266      ( 10 Nov 2023 02:10 )             33.0     11-10    133<L>  |  100  |  51<H>  ----------------------------<  112<H>  3.8   |  15<L>  |  4.03<H>    Ca    9.1      10 Nov 2023 08:52  Phos  5.8     11-10  Mg     2.9     11-10    TPro  7.2  /  Alb  3.2<L>  /  TBili  0.7  /  DBili  x   /  AST  113<H>  /  ALT  162<H>  /  AlkPhos  72  11-10    PT/INR - ( 10 Nov 2023 02:10 )   PT: 14.1 sec;   INR: 1.29 ratio         PTT - ( 10 Nov 2023 08:52 )  PTT:86.9 sec

## 2023-11-10 NOTE — PROGRESS NOTE ADULT - ATTENDING COMMENTS
53 yo M with known CAD and HFrEF (TTE 2018 showed low LVEF with anteroseptal akinesis) who presented with COVID infection, NSTEMI and CS.  RHC showed biventricular congestion and low CI. LHC showed occluded LAD and severe diffuse RCA disease (filling via L-R collaterals)  Improved with IABP and afterload reduction.     Initially failed IABP wean with hypotension, reduction of CI and worsening Cr, LFTs and Na.  On 11/6: RA~13, PAd~27 and CI~3 on IABP 1:1 and Nipride @2mcg/kg/min     After diuresis, he was weaned successfully without inotropes  Cr was 1.4, baseline 1.1  AST/ALT were improving close to normal now   CVP~12-13 on 11/7 - Cordis then removed    Overnight 11/8 had PMVT cardiac arrest with prompt CPR and defibrillation  Returned to CICU intubated and sedated on Lido and Amio gtt with persistent ectopy  Discussion held about IABP overnight but IC felt patient would be ok without it  PAC placed: CVP-12, PAP-60/30, CI>2.5LPM/m2 on no inotropes     Overnight 11/9 developed worsening CI and hypotension requiring addition of Milrinone   Now has ARIC of unclear etiology with oliguria - possible ATN  Telemetry now shows very minimal ectopy    - Patient agrees to AT work-up, will launch  - HOLD Spironolactone and vasodilators  - Continue IABP   - Continue Amio and Lido gtt   - Continue diuresis for goal CVP <9  - Continue ASA and Ticagrelor    *Please keep primary Dr. Banuelos [phone] in the loop per family request    Erlin Quijano MD .

## 2023-11-10 NOTE — PROGRESS NOTE ADULT - SUBJECTIVE AND OBJECTIVE BOX
LVAD and Heart Transplant Evaluation Consent    Met with patient, Mr. Hardy, and caregiver, Mrs. Hardy and their children for approximately 75 minutes to discuss consent for heart transplant and LVAD evaluations.  Reviewed evaluation process including testing, labs, appointments, and consults with the [facility] team.  Discussed surgical, medical, and psycho-social risks and benefits, selection process, UNOS waitlist information, and follow-up care. Discussed potential for donors with PHS increased risk behaviors, including option of a hepatitis C heart.    As well we reviewed and gave demonstration of LVAD equipment, pump, controller, batteries, clips etc. Patient/family demonstrated ability to manage and manipulate equipment. Discussed management of driveline, alarms and need for long term anticoagulation. Understands he/she cannot be submerged in water, use a sauna, play contact sports or have an MRI.       Patient and caregivers questions were answered.      Patient signed consents and was supplied copies of consents, LVAD education materials, transplant patient handbook, UNOS  Questions and Answers for transplant candidates regarding multiple listing and wait time transfer, along with contact phone number if they have any additional questions or needs.    Patient is aware we are available should they or their family have any additional questions or concerns.       Rita Bautista, [facility]  [phone]

## 2023-11-10 NOTE — PROGRESS NOTE ADULT - PROBLEM SELECTOR PLAN 1
- S/p PMVT / VF arrest 11/8 after being downgraded   - S/p IABPx1 removed, now replacing to limit ectopy and dec myocardial work   - Inotrope: Low threshold but run risk of more ectopy   - Cont hydral 100/ISDN 30 TID   - Cont Aldactone 25mg daily  - DC losartan iso renal dysfunction   - Change lasix IV --> Bumex gtt at 2   - Trend perfusion markers daily, MVO2, BMP, Lactate and LFT's  - Will open AT eval (11/10)   - Strict I/O's.  - Please keep primary Dr. Banuelos [phone] in the loop per family request.

## 2023-11-10 NOTE — PROGRESS NOTE ADULT - ASSESSMENT
ASSESSMENT  57 yo M with HTN, CAD (s/p PCI 2008), HFrEF, CVA 2018, and T2DM presenting with chest pressure and unknown tachycardia that was shocked x1, [facility] 11/1 found to have in-stent restenosis of pLAD and  of RCA with elevated RA and PA pressures and severely decreased EF 32%, admitted to CICU for management of cardiogenic shock requiring IABP 11/1 -11/7, with hospital course c/b vfib arrest.     PLAN    NEURO  - No neuro deficits known, baseline AOx3  - Sedated on Propofol while intubated, calm and appropriate, following simple commands/moving all extremities  - Further sedation awakening when more hemodynamically stable    # Anxiety  - Hold Ativan, f/u psych recs  - Continue Propofol while intubated   - Seroqual d/c s/t     PULM  # Intubated post arrest  - AC 18/300/ 5/30  - ABG appropriate, continue current settings  - SBT in AM for poss extubation    # COVID +  - Maintain Airborne precautions    # Asthma (hx of respiratory failure in 2018- intubated for 20 minutes per chart review pt report)  - c/w albuterol, symbicort and spiriva  - on trelegy at home    CV  #Vfib arrest insetting of ischemia vs. shock  - PAC insertion, mv02 69.5%, CI 3.1,   - c/w Lido gtt at 2mg/hr, continue Amio @.5  - pending cMRI  - Keep K > 4, Mag > 2.2     # Cardiogenic shock requiring IABP (11/1- 11/7)  - likely 2/2 NSTEMI and ADHF  - 11/1 [facility]: pLAD 100 % in-stent restenosis & mRCA, 100 %. PCWP 30  - 11/1 TTE: LV dilated. EF 32 %. Regional WMAs present, mod (grade 2) LV diastolic dysfunction  - 11/2 TTE: EF 22% and + LV thrombus   - s/p lasix 80 this am, appears euvolemic on exam, flat CVP 13, continue diuresis  - IABP removed 11/7  - Hydralazine 75 and isordil for AL reduction, hold as necessary while BP softer on propofol  - continue romel 25 daily  - continue to monitor perfusion indices   - continue Strict I&O, goal net negative  - Daily weights    # Stent re-occlusion of pLAD and 100%  of RCA  - EKG on admission w/ LBBB  - DAPT: c/w ASA, brillinta   - c/w lipitor 80  - CT sx not recommending CABG, undergoing AT eval  - pending cardiac MRI for viability    # LV thrombus  - c/w heparin gtt    # HTN  - afterload reduction as above    /RENAL  #non-oliguric ARIC  - sCr uptrending likely i/s/o post arrest, Baseline Cr: 1-1.22  - diuresis as above  - Trend BMP, lytes daily, replace as needed  - continue Strict I/Os, avoid nephrotoxins    GI  #Transaminitis  - Likely worsening i/s/o cardiogenic shock  - continue to trend daily    - NPO while intubated  - NGT for meds    #GERD  - c/w home protonix    # Bowel regimen  - miralax and senna d/c'd iso loose stools    ENDO  #Type 2 DM  - A1c 8.3, sugars uncontrolled on admission likely stress induced s/p cardiac arrest  - given 8 units lantus overnight, continue insulin gtt    HEME  - H/H and platelets stable  - continue to trend daily    # VTE prophylaxis   - c/w heparin gtt    ID  - leukocytosis likely s/t cardiac arrest  - afebrile, continue to monitor off abx  - concern for spiration event prior to intubation on admission - s/p vanco and cefepime 11/2)    #COVID  - Maintain airborne precautions    [facility]  - Full code    ======================= DISPOSITION  =====================  [X] Critical   [ ] Guarded    [ ] Stable    [X] Maintain in CICU  [ ] Downgrade to Telemetry  [ ] Discharge Home    Patient requires continuous monitoring with bedside rhythm monitoring, pulse ox monitoring, and intermittent blood gas analysis. Care plan discussed with ICU care team. Patient remained critical and at risk for life threatening decompensation.  Patient seen, examined and plan discussed with CCU team during rounds.     I have personally provided 35 minutes of critical care time excluding time spent on separate procedures, in addition to initial critical care time provided by the CICU Attending, Dr. Lees.    Rita Henry, [employer] ASSESSMENT  59 yo M with HTN, CAD (s/p PCI 2008), HFrEF, CVA 2018, and T2DM presenting with chest pressure and unknown tachycardia that was shocked x1, [facility] 11/1 found to have in-stent restenosis of pLAD and  of RCA with elevated RA and PA pressures and severely decreased EF 32%, admitted to CICU for management of cardiogenic shock requiring IABP 11/1 -11/7, with hospital course c/b vfib arrest.     PLAN    NEURO  - No neuro deficits known, baseline AOx3  - Propofol switched to Precedex  - Titrate Precedex to RASS post extubation     # Anxiety  - Hold Ativan, f/u psych recs  - c/w precedex for now  - No Seroquel or antipsychotics since vfib arrest and prolonged QTC (currently 528)    PULM  # Intubated post arrest  - Extubated to [facility] 11/10    # COVID +  - Maintain Airborne precautions    # Asthma (hx of respiratory failure in 2018- intubated for 20 minutes per chart review pt report)  - c/w albuterol, symbicort and spiriva  - on trelegy at home    CV  # Vfib arrest insetting of ischemia vs. shock  - PAC insertion, mv02 69.5%, CI 3.1,   - c/w Lido gtt at 2mg/hr, continue Amio @ 0.5  - Possibly decrease lido in afternoon if tele remains quiet  - pending cMRI  - Keep K > 4, Mag > 2.2     # Cardiogenic shock requiring IABP (11/1- 11/7)  - likely 2/2 NSTEMI and ADHF  - 11/1 [facility]: pLAD 100 % in-stent restenosis & mRCA, 100 %. PCWP 30  - 11/1 TTE: LV dilated. EF 32 %. Regional WMAs present, mod (grade 2) LV diastolic dysfunction  - 11/2 TTE: EF 22% and + LV thrombus   - Switching lasix to bumex due to poor UOP response and elevated CVPs  - IABP removed 11/7, reinserted 11/9  - Restarting hydralazine 25 TID and ISD 10 TID  - continue romel 25 daily  - continue to monitor perfusion indices   - continue Strict I&O, goal net negative  - Daily weights    # Stent re-occlusion of pLAD and 100%  of RCA  - EKG on admission w/ LBBB  - DAPT: c/w ASA, brillinta   - c/w lipitor 80  - CT sx not recommending CABG, undergoing AT eval  - pending cardiac MRI for viability    # LV thrombus  - c/w heparin gtt    # HTN  - afterload reduction as above    /RENAL  #non-oliguric ARIC  - sCr uptrending likely i/s/o post arrest, Baseline Cr: 1-1.22  - diuresis as above  - Trend BMP, lytes daily, replace as needed  - continue Strict I/Os, avoid nephrotoxins    GI  #Transaminitis  - Likely worsening i/s/o cardiogenic shock  - continue to trend daily    - NPO while intubated  - NGT for meds    #GERD  - c/w home protonix    # Bowel regimen  - miralax and senna d/c'd iso loose stools    ENDO  #Type 2 DM  - A1c 8.3, sugars uncontrolled on admission likely stress induced s/p cardiac arrest  - c/w insulin gtt    HEME  - H/H and platelets stable  - continue to trend daily    # VTE prophylaxis   - c/w heparin gtt    ID  - leukocytosis likely s/t cardiac arrest  - afebrile, continue to monitor off abx  - concern for spiration event prior to intubation on admission - s/p vanco and cefepime 11/2)    #COVID  - Maintain airborne precautions    [facility]  - Full code    ======================= DISPOSITION  =====================  [X] Critical   [ ] Guarded    [ ] Stable    [X] Maintain in CICU  [ ] Downgrade to Telemetry  [ ] Discharge Home    Patient requires continuous monitoring with bedside rhythm monitoring, pulse ox monitoring, and intermittent blood gas analysis. Care plan discussed with ICU care team. Patient remained critical and at risk for life threatening decompensation.  Patient seen, examined and plan discussed with CCU team during rounds.     I have personally provided 35 minutes of critical care time excluding time spent on separate procedures, in addition to initial critical care time provided by the CICU Attending, Dr. Lees.    Rita Henry, [facility] ASSESSMENT  57 yo M with HTN, CAD (s/p PCI 2008), HFrEF, CVA 2018, and T2DM presenting with chest pressure and unknown tachycardia that was shocked x1, [facility] 11/1 found to have in-stent restenosis of pLAD and  of RCA with elevated RA and PA pressures and severely decreased EF 32%, admitted to CICU for management of cardiogenic shock requiring IABP 11/1 -11/7, with hospital course c/b vfib arrest.     PLAN    NEURO  - No neuro deficits known, baseline AOx3  - Propofol switched to Precedex, d/c post extubation    # Anxiety  - Hold Ativan  - No Seroquel or antipsychotics since vfib arrest and prolonged QTC (currently 528)  - Psych following    PULM  # Intubated post arrest  - Extubated to [facility] 11/10    # COVID +  - Maintain Airborne precautions    # Asthma (hx of respiratory failure in 2018- intubated for 20 minutes per chart review pt report)  - c/w albuterol, symbicort and spiriva  - on trelegy at home    CV  # Vfib arrest insetting of ischemia vs. shock  - PAC insertion, mv02 69.5%, CI 3.1,   - c/w Lido gtt at 2mg/hr, continue Amio @ 0.5  - Possibly decrease lido in afternoon if tele remains quiet  - pending cMRI  - Keep K > 4, Mag > 2.2     # Cardiogenic shock requiring IABP (11/1- 11/7)  - likely 2/2 NSTEMI and ADHF  - 11/1 [facility]: pLAD 100 % in-stent restenosis & mRCA, 100 %. PCWP 30  - 11/1 TTE: LV dilated. EF 32 %. Regional WMAs present, mod (grade 2) LV diastolic dysfunction  - 11/2 TTE: EF 22% and + LV thrombus   - Switching lasix to bumex due to poor UOP response and elevated CVPs  - IABP removed 11/7, reinserted 11/9  - Restarting hydralazine 25 TID and ISD 10 TID  - continue romel 25 daily  - continue to monitor perfusion indices   - continue Strict I&O, goal net negative  - Daily weights    # Stent re-occlusion of pLAD and 100%  of RCA  - EKG on admission w/ LBBB  - DAPT: c/w ASA, brillinta   - c/w lipitor 80  - CT sx not recommending CABG, undergoing AT eval  - pending cardiac MRI for viability    # LV thrombus  - c/w heparin gtt    # HTN  - afterload reduction as above    /RENAL  #non-oliguric ARIC  - sCr uptrending likely i/s/o post arrest, Baseline Cr: 1-1.22  - diuresis as above  - Trend BMP, lytes daily, replace as needed  - continue Strict I/Os, avoid nephrotoxins    GI  #Transaminitis  - Likely worsening i/s/o cardiogenic shock  - continue to trend daily    - NPO while intubated  - NGT for meds    #GERD  - c/w home protonix    # Bowel regimen  - miralax and senna d/c'd iso loose stools    ENDO  #Type 2 DM  - A1c 8.3, sugars uncontrolled on admission likely stress induced s/p cardiac arrest  - c/w insulin gtt    HEME  - H/H and platelets stable  - continue to trend daily    # VTE prophylaxis   - c/w heparin gtt    ID  - leukocytosis likely s/t cardiac arrest  - afebrile, continue to monitor off abx  - concern for spiration event prior to intubation on admission - s/p vanco and cefepime 11/2)    #COVID  - Maintain airborne precautions    [facility]  - Full code    ======================= DISPOSITION  =====================  [X] Critical   [ ] Guarded    [ ] Stable    [X] Maintain in CICU  [ ] Downgrade to Telemetry  [ ] Discharge Home    Patient requires continuous monitoring with bedside rhythm monitoring, pulse ox monitoring, and intermittent blood gas analysis. Care plan discussed with ICU care team. Patient remained critical and at risk for life threatening decompensation.  Patient seen, examined and plan discussed with CCU team during rounds.     I have personally provided 35 minutes of critical care time excluding time spent on separate procedures, in addition to initial critical care time provided by the CICU Attending, Dr. Lees.    Rita Henry, [facility]

## 2023-11-10 NOTE — PROGRESS NOTE ADULT - SUBJECTIVE AND OBJECTIVE BOX
LD VALENCIA  MRN-[id number]  Patient is a 58y old  Male who presents with a chief complaint of SOB (10 Nov 2023 06:38)    HPI:  pt seen and approx 1:30 pm  in [facility]    59yo M w/ hx HTN, CAD w/ 1 stent in 2009, ICH (2008) presenting with abn ekg. Patient presented to [facility] where he was found to have STEMI, recommended to get cath however patient did not want to get it there so it left and came here.  Patient initially had cough, congestion, fever, was placed on antibiotics on Sunday.  Started feeling nauseous and had a presyncopal event after which he presented to ED last night.  Had chest pain as well.  Chest pain is midsternal.  Not currently having chest pain.  Received 4 aspirin 30 min pta. (01 Nov 2023 15:11)      Surgery/Hospital course:    Overnight events:    REVIEW OF SYSTEMS:    CONSTITUTIONAL: No weakness, fevers or chills  EYES/ENT: No visual changes;  No vertigo or throat pain   NECK: No pain or stiffness  RESPIRATORY: No cough, wheezing, hemoptysis; No shortness of breath  CARDIOVASCULAR: No chest pain or palpitations  GASTROINTESTINAL: No abdominal or epigastric pain. No nausea, vomiting, or hematemesis; No diarrhea or constipation. No melena or hematochezia.  GENITOURINARY: No dysuria, frequency or hematuria  NEUROLOGICAL: No numbness or weakness  SKIN: No itching, rashes      Physical Exam:  Vital Signs Last 24 Hrs  T(C): 37.4 (10 Nov 2023 18:00), Max: 38 (10 Nov 2023 08:00)  T(F): 99.3 (10 Nov 2023 18:00), Max: 100.4 (10 Nov 2023 08:00)  HR: 92 (10 Nov 2023 22:00) (80 - 99)  BP: --  BP(mean): --  RR: 20 (10 Nov 2023 22:00) (15 - 26)  SpO2: 96% (10 Nov 2023 22:00) (92% - 96%)    Parameters below as of 10 Nov 2023 20:00  Patient On (Oxygen Delivery Method): nasal cannula, high flow  O2 Flow (L/min): 5    Physical Exam:   Constitutional: NAD, well-groomed, well-developed  HEENT: PERRLA, EOMI, no drainage or redness  Neck: supple,  No JVD  Respiratory: Breath Sounds equal & clear bilaterally to auscultation, no rales/rhonchi/wheezing, no accessory muscle use noted  Cardiovascular: Regular rate, regular rhythm, normal S1, S2; no murmurs or rub  Gastrointestinal: Soft, non-tender, non distended, + bowel sounds  Extremities: MOISE x 4, no peripheral edema, no cyanosis, no clubbing   Neurological: A+O x 3; speech clear and intact; no sensory, motor  deficits, normal reflexes  Skin: warm, dry, well perfused  Incisions:    ============================I/O===========================   I&O's Detail    09 Nov 2023 07:01  -  10 Nov 2023 07:00  --------------------------------------------------------  IN:    Amiodarone: 400.8 mL    Dexmedetomidine: 12.3 mL    dextrose 5% + sodium chloride 0.9%: 420 mL    Enteral Tube Flush: 110 mL    Heparin: 400.5 mL    Insulin: 117 mL    IV PiggyBack: 100 mL    Lidocaine: 360 mL    Milrinone: 11.1 mL    Propofol: 271.9 mL    Vasopressin: 54 mL  Total IN: 2257.6 mL    OUT:    Indwelling Catheter - Urethral (mL): 1685 mL  Total OUT: 1685 mL    Total NET: 572.6 mL      10 Nov 2023 07:01  -  10 Nov 2023 22:10  --------------------------------------------------------  IN:    Amiodarone: 217.1 mL    Bumetanide: 5 mL    Bumetanide: 10 mL    Dexmedetomidine: 19.8 mL    dextrose 5% + sodium chloride 0.9%: 455 mL    Heparin: 192 mL    Insulin: 47 mL    IV PiggyBack: 300 mL    Lidocaine: 30 mL    Lidocaine: 105 mL    Lidocaine: 30 mL    Milrinone: 48.1 mL    Oral Fluid: 120 mL  Total IN: 1579 mL    OUT:    Indwelling Catheter - Urethral (mL): 2650 mL    Propofol: 0 mL    Vasopressin: 0 mL  Total OUT: 2650 mL    Total NET: -1071 mL        ============================ LABS =========================                        12.0   14.01 )-----------( 270      ( 10 Nov 2023 17:04 )             36.0     11-10    135  |  99  |  53<H>  ----------------------------<  122<H>  3.6   |  18<L>  |  4.07<H>    Ca    9.1      10 Nov 2023 17:09  Phos  5.4     11-10  Mg     2.5     11-10    TPro  7.4  /  Alb  3.4  /  TBili  1.0  /  DBili  x   /  AST  102<H>  /  ALT  153<H>  /  AlkPhos  74  11-10    LIVER FUNCTIONS - ( 10 Nov 2023 17:09 )  Alb: 3.4 g/dL / Pro: 7.4 g/dL / ALK PHOS: 74 U/L / ALT: 153 U/L / AST: 102 U/L / GGT: x           PT/INR - ( 10 Nov 2023 17:01 )   PT: 14.7 sec;   INR: 1.41 ratio         PTT - ( 10 Nov 2023 17:01 )  PTT:55.1 sec  ABG - ( 10 Nov 2023 16:40 )  pH, Arterial: 7.42  pH, Blood: x     /  pCO2: 32    /  pO2: 160   / HCO3: 21    / Base Excess: -2.9  /  SaO2: 99.5              Urinalysis Basic - ( 10 Nov 2023 17:09 )    Color: x / Appearance: x / SG: x / pH: x  Gluc: 122 mg/dL / Ketone: x  / Bili: x / Urobili: x   Blood: x / Protein: x / Nitrite: x   Leuk Esterase: x / RBC: x / WBC x   Sq Epi: x / Non Sq Epi: x / Bacteria: x      ======================Micro/Rad/Cardio=================  Culture: Reviewed   CXR: Reviewed  Echo:Reviewed  ======================================================  PAST MEDICAL & SURGICAL HISTORY:  HTN (hypertension)      CAD (coronary artery disease)  2009; stent      Intracranial hemorrhage  2008      Respiratory arrest  december 1st      Myocardial infarction, unspecified MI type, unspecified artery      History of coronary artery stent placement        ASSESSMENT  57 yo M with HTN, CAD (s/p PCI 2008), HFrEF, CVA 2018, and T2DM presenting with chest pressure and unknown tachycardia that was shocked x1, C 11/1 found to have in-stent restenosis of pLAD and  of RCA with elevated RA and PA pressures and severely decreased EF 32%, admitted to CICU for management of cardiogenic shock requiring IABP 11/1 -11/7, with hospital course c/b vfib arrest.     PLAN    NEURO  - No neuro deficits known, baseline AOx3  - Propofol switched to Precedex, d/c post extubation    # Anxiety  - Hold Ativan  - No Seroquel or antipsychotics since vfib arrest and prolonged QTC (currently 528)  - Psych following    PULM  # Intubated post arrest  - Extubated to [facility] 11/10    # COVID +  - Maintain Airborne precautions    # Asthma (hx of respiratory failure in 2018- intubated for 20 minutes per chart review pt report)  - c/w albuterol, symbicort and spiriva  - on trelegy at home    CV  # Vfib arrest insetting of ischemia vs. shock  - PAC insertion, mv02 69.5%, CI 3.1,   -  Lido gtt reduced at 1mg/hr, continue Amio @ 0.5  - pending cMRI  - Keep K > 4, Mag > 2.2     # Cardiogenic shock requiring IABP (11/1- 11/7)  - likely 2/2 NSTEMI and ADHF  - 11/1 LHC: pLAD 100 % in-stent restenosis & mRCA, 100 %. PCWP 30  - 11/1 TTE: LV dilated. EF 32 %. Regional WMAs present, mod (grade 2) LV diastolic dysfunction  - 11/2 TTE: EF 22% and + LV thrombus   - Switching lasix to bumex due to poor UOP response and elevated CVPs  - IABP removed 11/7, reinserted 11/9  - Uptitrating PO AL hydralazine 50 TID and ISD 10 TID  - continue romel 25 daily  - continue to monitor perfusion indices   - continue Strict I&O, goal net negative  - Daily weights    # Stent re-occlusion of pLAD and 100%  of RCA  - EKG on admission w/ LBBB  - DAPT: c/w ASA, brillinta   - c/w lipitor 80  - CT sx not recommending CABG, undergoing AT eval  - pending cardiac MRI for viability    # LV thrombus  - c/w heparin gtt    # HTN  - afterload reduction as above    /RENAL  #non-oliguric ARIC  - sCr uptrending likely i/s/o post arrest, Baseline Cr: 1-1.22  - diuresis as above  - Trend BMP, lytes daily, replace as needed  - continue Strict I/Os, avoid nephrotoxins    GI  #Transaminitis  - Likely worsening i/s/o cardiogenic shock  - continue to trend daily    - NPO while intubated  - NGT for meds    #GERD  - c/w home protonix    # Bowel regimen  - miralax and senna d/c'd iso loose stools    ENDO  #Type 2 DM  - A1c 8.3, sugars uncontrolled on admission likely stress induced s/p cardiac arrest  - c/w insulin gtt    HEME  - H/H and platelets stable  - continue to trend daily    # VTE prophylaxis   - c/w heparin gtt    ID  - leukocytosis likely s/t cardiac arrest  - afebrile, continue to monitor off abx  - concern for aspiration event prior to intubation on admission - s/p vanco and cefepime 11/2)    #COVID  - Maintain airborne precautions    [facility]  - Full code    ======================= DISPOSITION  =====================  [X] Critical   [ ] Guarded    [ ] Stable    [X] Maintain in CICU  [ ] Downgrade to Telemetry  [ ] Discharge Home    Patient requires continuous monitoring with bedside rhythm monitoring, pulse ox monitoring, and intermittent blood gas analysis. Care plan discussed with ICU care team. Patient remained critical and at risk for life threatening decompensation.  Patient seen, examined and plan discussed with CCU team during rounds.     I have personally provided 35 minutes of critical care time excluding time spent on separate procedures, in addition to initial critical care time provided by the CICU Attending, Dr. Lees.    Rita Henry, AGACN-BCASSESSMENT ==============  ASSESSMENT  57 yo M with HTN, CAD (s/p PCI 2008), HFrEF, CVA 2018, and T2DM presenting with chest pressure and unknown tachycardia that was shocked x1, C 11/1 found to have in-stent restenosis of pLAD and  of RCA with elevated RA and PA pressures and severely decreased EF 32%, admitted to CICU for management of cardiogenic shock requiring IABP 11/1 -11/7, with hospital course c/b vfib arrest.     PLAN    NEURO  - No neuro deficits known, baseline AOx3  - Propofol switched to Precedex, d/c post extubation    # Anxiety  - Hold Ativan  - No Seroquel or antipsychotics since vfib arrest and prolonged QTC (currently 528)  - Psych following    PULM  # Intubated post arrest  - Extubated to [facility] 11/10    # COVID +  - Maintain Airborne precautions    # Asthma (hx of respiratory failure in 2018- intubated for 20 minutes per chart review pt report)  - c/w albuterol, symbicort and spiriva  - on trelegy at home    CV  # Vfib arrest insetting of ischemia vs. shock  - PAC insertion, mv02 69.5%, CI 3.1,   - c/w Lido gtt at 2mg/hr, continue Amio @ 0.5  - Possibly decrease lido in afternoon if tele remains quiet  - pending cMRI  - Keep K > 4, Mag > 2.2     # Cardiogenic shock requiring IABP (11/1- 11/7)  - likely 2/2 NSTEMI and ADHF  - 11/1 LHC: pLAD 100 % in-stent restenosis & mRCA, 100 %. PCWP 30  - 11/1 TTE: LV dilated. EF 32 %. Regional WMAs present, mod (grade 2) LV diastolic dysfunction  - 11/2 TTE: EF 22% and + LV thrombus   - Switching lasix to bumex due to poor UOP response and elevated CVPs  - IABP removed 11/7, reinserted 11/9  - Restarting hydralazine 25 TID and ISD 10 TID  - continue romel 25 daily  - continue to monitor perfusion indices   - continue Strict I&O, goal net negative  - Daily weights    # Stent re-occlusion of pLAD and 100%  of RCA  - EKG on admission w/ LBBB  - DAPT: c/w ASA, brillinta   - c/w lipitor 80  - CT sx not recommending CABG, undergoing AT eval  - pending cardiac MRI for viability    # LV thrombus  - c/w heparin gtt    # HTN  - afterload reduction as above    /RENAL  #non-oliguric ARIC  - sCr uptrending likely i/s/o post arrest, Baseline Cr: 1-1.22  - diuresis as above  - Trend BMP, lytes daily, replace as needed  - continue Strict I/Os, avoid nephrotoxins    GI  #Transaminitis  - Likely worsening i/s/o cardiogenic shock  - continue to trend daily    - NPO while intubated  - NGT for meds    #GERD  - c/w home protonix    # Bowel regimen  - miralax and senna d/c'd iso loose stools    ENDO  #Type 2 DM  - A1c 8.3, sugars uncontrolled on admission likely stress induced s/p cardiac arrest  - c/w insulin gtt    HEME  - H/H and platelets stable  - continue to trend daily    # VTE prophylaxis   - c/w heparin gtt    ID  - leukocytosis likely s/t cardiac arrest  - afebrile, continue to monitor off abx  - concern for spiration event prior to intubation on admission - s/p vanco and cefepime 11/2)    #COVID  - Maintain airborne precautions    [facility]  - Full code    ======================= DISPOSITION  =====================  [X] Critical   [ ] Guarded    [ ] Stable    [X] Maintain in CICU  [ ] Downgrade to Telemetry  [ ] Discharge Home    Patient requires continuous monitoring with bedside rhythm monitoring, pulse ox monitoring, and intermittent blood gas analysis. Care plan discussed with ICU care team. Patient remained critical and at risk for life threatening decompensation.  Patient seen, examined and plan discussed with CCU team during rounds.     I have personally provided 35 minutes of critical care time excluding time spent on separate procedures, in addition to initial critical care time provided by the CICU Attending, Dr. Lees.    Rita Henry, [facility]      Patient requires continuous monitoring with bedside rhythm monitoring, arterial line, pulse oximetry, ventilator monitoring and intermittent blood gas analysis.  Care plan discussed with ICU care team.  Patient remained critical; required more than usual post op care; I have spent 35 minutes providing non-routine post op care, revaluated multiple times during the day. LD VALENCIA  MRN-[id number]  Patient is a 58y old  Male who presents with a chief complaint of SOB (10 Nov 2023 06:38)    HPI:  pt seen and approx 1:30 pm  in [facility]    57yo M w/ hx HTN, CAD w/ 1 stent in 2009, ICH (2008) presenting with abn ekg. Patient presented to [facility] where he was found to have STEMI, recommended to get cath however patient did not want to get it there so it left and came here.  Patient initially had cough, congestion, fever, was placed on antibiotics on Sunday.  Started feeling nauseous and had a presyncopal event after which he presented to ED last night.  Had chest pain as well.  Chest pain is midsternal.  Not currently having chest pain.  Received 4 aspirin 30 min pta. (01 Nov 2023 15:11)    24 Hours: pt now s/p extubation today    REVIEW OF SYSTEMS:    CONSTITUTIONAL: No weakness, fevers or chills  EYES/ENT: No visual changes;  No vertigo or throat pain   NECK: No pain or stiffness  RESPIRATORY: No cough, wheezing, hemoptysis; No shortness of breath  CARDIOVASCULAR: No chest pain or palpitations  GASTROINTESTINAL: No abdominal or epigastric pain. No nausea, vomiting, or hematemesis; No diarrhea or constipation. No melena or hematochezia.  GENITOURINARY: No dysuria, frequency or hematuria  NEUROLOGICAL: No numbness or weakness  SKIN: No itching, rashes      Physical Exam:  Vital Signs Last 24 Hrs  T(C): 37.4 (10 Nov 2023 18:00), Max: 38 (10 Nov 2023 08:00)  T(F): 99.3 (10 Nov 2023 18:00), Max: 100.4 (10 Nov 2023 08:00)  HR: 92 (10 Nov 2023 22:00) (80 - 99)  BP: --  BP(mean): --  RR: 20 (10 Nov 2023 22:00) (15 - 26)  SpO2: 96% (10 Nov 2023 22:00) (92% - 96%)    Parameters below as of 10 Nov 2023 20:00  Patient On (Oxygen Delivery Method): nasal cannula, high flow  O2 Flow (L/min): 5    ============================I/O===========================   I&O's Detail    09 Nov 2023 07:01  -  10 Nov 2023 07:00  --------------------------------------------------------  IN:    Amiodarone: 400.8 mL    Dexmedetomidine: 12.3 mL    dextrose 5% + sodium chloride 0.9%: 420 mL    Enteral Tube Flush: 110 mL    Heparin: 400.5 mL    Insulin: 117 mL    IV PiggyBack: 100 mL    Lidocaine: 360 mL    Milrinone: 11.1 mL    Propofol: 271.9 mL    Vasopressin: 54 mL  Total IN: 2257.6 mL    OUT:    Indwelling Catheter - Urethral (mL): 1685 mL  Total OUT: 1685 mL    Total NET: 572.6 mL      10 Nov 2023 07:01  -  10 Nov 2023 22:10  --------------------------------------------------------  IN:    Amiodarone: 217.1 mL    Bumetanide: 5 mL    Bumetanide: 10 mL    Dexmedetomidine: 19.8 mL    dextrose 5% + sodium chloride 0.9%: 455 mL    Heparin: 192 mL    Insulin: 47 mL    IV PiggyBack: 300 mL    Lidocaine: 30 mL    Lidocaine: 105 mL    Lidocaine: 30 mL    Milrinone: 48.1 mL    Oral Fluid: 120 mL  Total IN: 1579 mL    OUT:    Indwelling Catheter - Urethral (mL): 2650 mL    Propofol: 0 mL    Vasopressin: 0 mL  Total OUT: 2650 mL    Total NET: -1071 mL        ============================ LABS =========================                        12.0   14.01 )-----------( 270      ( 10 Nov 2023 17:04 )             36.0     11-10    135  |  99  |  53<H>  ----------------------------<  122<H>  3.6   |  18<L>  |  4.07<H>    Ca    9.1      10 Nov 2023 17:09  Phos  5.4     11-10  Mg     2.5     11-10    TPro  7.4  /  Alb  3.4  /  TBili  1.0  /  DBili  x   /  AST  102<H>  /  ALT  153<H>  /  AlkPhos  74  11-10    LIVER FUNCTIONS - ( 10 Nov 2023 17:09 )  Alb: 3.4 g/dL / Pro: 7.4 g/dL / ALK PHOS: 74 U/L / ALT: 153 U/L / AST: 102 U/L / GGT: x           PT/INR - ( 10 Nov 2023 17:01 )   PT: 14.7 sec;   INR: 1.41 ratio         PTT - ( 10 Nov 2023 17:01 )  PTT:55.1 sec  ABG - ( 10 Nov 2023 16:40 )  pH, Arterial: 7.42  pH, Blood: x     /  pCO2: 32    /  pO2: 160   / HCO3: 21    / Base Excess: -2.9  /  SaO2: 99.5              Urinalysis Basic - ( 10 Nov 2023 17:09 )    Color: x / Appearance: x / SG: x / pH: x  Gluc: 122 mg/dL / Ketone: x  / Bili: x / Urobili: x   Blood: x / Protein: x / Nitrite: x   Leuk Esterase: x / RBC: x / WBC x   Sq Epi: x / Non Sq Epi: x / Bacteria: x      ======================Micro/Rad/Cardio=================  Culture: Reviewed   CXR: Reviewed  Echo:Reviewed  ======================================================  PAST MEDICAL & SURGICAL HISTORY:  HTN (hypertension)      CAD (coronary artery disease)  2009; stent      Intracranial hemorrhage  2008      Respiratory arrest  december 1st      Myocardial infarction, unspecified MI type, unspecified artery      History of coronary artery stent placement        ASSESSMENT  59 yo M with HTN, CAD (s/p PCI 2008), HFrEF, CVA 2018, and T2DM presenting with chest pressure and unknown tachycardia that was shocked x1, C 11/1 found to have in-stent restenosis of pLAD and  of RCA with elevated RA and PA pressures and severely decreased EF 32%, admitted to CICU for management of cardiogenic shock requiring IABP 11/1 -11/7, with hospital course c/b vfib arrest.     PLAN    NEURO  - No neuro deficits known, baseline AOx3  - Propofol switched to Precedex, d/c post extubation    # Anxiety  - Hold Ativan  - No Seroquel or antipsychotics since vfib arrest and prolonged QTC (currently 528)  - Psych following    PULM  # Intubated post arrest  - Extubated to [facility] 11/10    # COVID +  - Maintain Airborne precautions    # Asthma (hx of respiratory failure in 2018- intubated for 20 minutes per chart review pt report)  - c/w albuterol, symbicort and spiriva  - on trelegy at home    CV  # Vfib arrest insetting of ischemia vs. shock  - PAC insertion, mv02 69.5%, CI 3.1,   -  Lido gtt reduced at 1mg/hr, continue Amio @ 0.5  - pending cMRI  - Keep K > 4, Mag > 2.2     # Cardiogenic shock requiring IABP (11/1- 11/7)  - likely 2/2 NSTEMI and ADHF  - 11/1 LH: pLAD 100 % in-stent restenosis & mRCA, 100 %. PCWP 30  - 11/1 TTE: LV dilated. EF 32 %. Regional WMAs present, mod (grade 2) LV diastolic dysfunction  - 11/2 TTE: EF 22% and + LV thrombus   - Switching lasix to bumex due to poor UOP response and elevated CVPs  - IABP removed 11/7, reinserted 11/9  - Uptitrating PO AL hydralazine 50 TID and ISD 10 TID  - continue romel 25 daily  - continue to monitor perfusion indices   - continue Strict I&O, goal net negative  - Daily weights    # Stent re-occlusion of pLAD and 100%  of RCA  - EKG on admission w/ LBBB  - DAPT: c/w ASA, brillinta   - c/w lipitor 80  - CT sx not recommending CABG, undergoing AT eval  - pending cardiac MRI for viability    # LV thrombus  - c/w heparin gtt    # HTN  - afterload reduction as above    /RENAL  #non-oliguric ARIC  - sCr uptrending likely i/s/o post arrest, Baseline Cr: 1-1.22  - diuresis as above  - Trend BMP, lytes daily, replace as needed  - continue Strict I/Os, avoid nephrotoxins    GI  #Transaminitis  - Likely worsening i/s/o cardiogenic shock  - continue to trend daily    - NPO while intubated  - NGT for meds    #GERD  - c/w home protonix    # Bowel regimen  - miralax and senna d/c'd iso loose stools    ENDO  #Type 2 DM  - A1c 8.3, sugars uncontrolled on admission likely stress induced s/p cardiac arrest  - c/w insulin gtt    HEME  - H/H and platelets stable  - continue to trend daily    # VTE prophylaxis   - c/w heparin gtt    ID  - leukocytosis likely s/t cardiac arrest  - afebrile, continue to monitor off abx  - concern for aspiration event prior to intubation on admission - s/p vanco and cefepime 11/2)    #COVID  - Maintain airborne precautions    [facility]  - Full code    ======================= DISPOSITION  =====================  [X] Critical   [ ] Guarded    [ ] Stable    [X] Maintain in CICU  [ ] Downgrade to Telemetry  [ ] Discharge Home    Patient requires continuous monitoring with bedside rhythm monitoring, pulse ox monitoring, and intermittent blood gas analysis. Care plan discussed with ICU care team. Patient remained critical and at risk for life threatening decompensation.  Patient seen, examined and plan discussed with CCU team during rounds.     I have personally provided 35 minutes of critical care time excluding time spent on separate procedures, in addition to initial critical care time provided by the CICU Attending, Dr. Lees.    Rita Henry, AGACNP-BCASSESSMENT ==============  ASSESSMENT  59 yo M with HTN, CAD (s/p PCI 2008), HFrEF, CVA 2018, and T2DM presenting with chest pressure and unknown tachycardia that was shocked x1, [facility] 11/1 found to have in-stent restenosis of pLAD and  of RCA with elevated RA and PA pressures and severely decreased EF 32%, admitted to CICU for management of cardiogenic shock requiring IABP 11/1 -11/7, with hospital course c/b vfib arrest.     PLAN    NEURO  - No neuro deficits known, baseline AOx3  - Propofol switched to Precedex, d/c post extubation    # Anxiety  - Hold Ativan  - No Seroquel or antipsychotics since vfib arrest and prolonged QTC (currently 528)  - Psych following    PULM  # Intubated post arrest  - Extubated to [facility] 11/10    # COVID +  - Maintain Airborne precautions    # Asthma (hx of respiratory failure in 2018- intubated for 20 minutes per chart review pt report)  - c/w albuterol, symbicort and spiriva  - on trelegy at home    CV  # Vfib arrest insetting of ischemia vs. shock  - PAC insertion, mv02 69.5%, CI 3.1,   - c/w Lido gtt at 2mg/hr, continue Amio @ 0.5  - Possibly decrease lido in afternoon if tele remains quiet  - pending cMRI  - Keep K > 4, Mag > 2.2     # Cardiogenic shock requiring IABP (11/1- 11/7)  - likely 2/2 NSTEMI and ADHF  - 11/1 [facility]: pLAD 100 % in-stent restenosis & mRCA, 100 %. PCWP 30  - 11/1 TTE: LV dilated. EF 32 %. Regional WMAs present, mod (grade 2) LV diastolic dysfunction  - 11/2 TTE: EF 22% and + LV thrombus   - Switching lasix to bumex due to poor UOP response and elevated CVPs  - IABP removed 11/7, reinserted 11/9  - Restarting hydralazine 25 TID and ISD 10 TID  - continue romel 25 daily  - continue to monitor perfusion indices   - continue Strict I&O, goal net negative  - Daily weights    # Stent re-occlusion of pLAD and 100%  of RCA  - EKG on admission w/ LBBB  - DAPT: c/w ASA, brillinta   - c/w lipitor 80  - CT sx not recommending CABG, undergoing AT eval  - pending cardiac MRI for viability    # LV thrombus  - c/w heparin gtt    # HTN  - afterload reduction as above    /RENAL  #non-oliguric ARIC  - sCr uptrending likely i/s/o post arrest, Baseline Cr: 1-1.22  - diuresis as above  - Trend BMP, lytes daily, replace as needed  - continue Strict I/Os, avoid nephrotoxins    GI  #Transaminitis  - Likely worsening i/s/o cardiogenic shock  - continue to trend daily    - NPO while intubated  - NGT for meds    #GERD  - c/w home protonix    # Bowel regimen  - miralax and senna d/c'd iso loose stools    ENDO  #Type 2 DM  - A1c 8.3, sugars uncontrolled on admission likely stress induced s/p cardiac arrest  - c/w insulin gtt    HEME  - H/H and platelets stable  - continue to trend daily    # VTE prophylaxis   - c/w heparin gtt    ID  - leukocytosis likely s/t cardiac arrest  - afebrile, continue to monitor off abx  - concern for spiration event prior to intubation on admission - s/p vanco and cefepime 11/2)    #COVID  - Maintain airborne precautions    GOC  - Full code    ======================= DISPOSITION  =====================  [X] Critical   [ ] Guarded    [ ] Stable    [X] Maintain in CICU  [ ] Downgrade to Telemetry  [ ] Discharge Home    Patient requires continuous monitoring with bedside rhythm monitoring, pulse ox monitoring, and intermittent blood gas analysis. Care plan discussed with ICU care team. Patient remained critical and at risk for life threatening decompensation.  Patient seen, examined and plan discussed with CCU team during rounds.     I have personally provided 35 minutes of critical care time excluding time spent on separate procedures, in addition to initial critical care time provided by the CICU Attending, Dr. Lees.    Rita Henry, [facility]      Patient requires continuous monitoring with bedside rhythm monitoring, arterial line, pulse oximetry, ventilator monitoring and intermittent blood gas analysis.  Care plan discussed with ICU care team.  Patient remained critical; required more than usual post op care; I have spent 35 minutes providing non-routine post op care, revaluated multiple times during the day.

## 2023-11-10 NOTE — PROGRESS NOTE ADULT - SUBJECTIVE AND OBJECTIVE BOX
PATIENT:  LD VALENCIA  [id number]    CHIEF COMPLAINT:  Patient is a 58y old male who presents with a chief complaint of SOB/CP (08 Nov 2023 19:19)      INTERVAL HISTORY/OVERNIGHT EVENTS:      REVIEW OF SYSTEMS:    [X] Unable to assess ROS because pt. is intubated and sedated    MEDICATIONS:  MEDICATIONS  (STANDING):  aMIOdarone Infusion 0.5 mG/Min (16.7 mL/Hr) IV Continuous <Continuous>  aspirin  chewable 81 milliGRAM(s) Oral daily  atorvastatin 80 milliGRAM(s) Oral at bedtime  budesonide  80 MICROgram(s)/formoterol 4.5 MICROgram(s) Inhaler 2 Puff(s) Inhalation two times a day  chlorhexidine 2% Cloths 1 Application(s) Topical daily  dexMEDEtomidine Infusion 0.5 MICROgram(s)/kG/Hr (12.4 mL/Hr) IV Continuous <Continuous>  dextrose 5% + sodium chloride 0.9%. 1000 milliLiter(s) (35 mL/Hr) IV Continuous <Continuous>  dextrose 50% Injectable 12.5 Gram(s) IV Push once  dextrose 50% Injectable 25 Gram(s) IV Push once  furosemide   Injectable 80 milliGRAM(s) IV Push daily  glucagon  Injectable 1 milliGRAM(s) IntraMuscular once  heparin  Infusion 1600 Unit(s)/Hr (15.5 mL/Hr) IV Continuous <Continuous>  insulin regular Infusion 3 Unit(s)/Hr (3 mL/Hr) IV Continuous <Continuous>  lidocaine   Infusion 2 mG/Min (15 mL/Hr) IV Continuous <Continuous>  milrinone Infusion 0.125 MICROgram(s)/kG/Min (3.71 mL/Hr) IV Continuous <Continuous>  pantoprazole  Injectable 40 milliGRAM(s) IV Push daily  propofol Infusion 20 MICROgram(s)/kG/Min (11.9 mL/Hr) IV Continuous <Continuous>  spironolactone 25 milliGRAM(s) Oral daily  ticagrelor 90 milliGRAM(s) Oral every 12 hours  tiotropium 2.5 MICROgram(s) Inhaler 2 Puff(s) Inhalation daily  vasopressin Infusion 0.06 Unit(s)/Min (9 mL/Hr) IV Continuous <Continuous>    MEDICATIONS  (PRN):  albuterol/ipratropium for Nebulization 3 milliLiter(s) Nebulizer every 6 hours PRN Shortness of Breath and/or Wheezing      ALLERGIES:  penicillins (Unknown)    OBJECTIVE:  ICU Vital Signs Last 24 Hrs  T(C): 37.5 (10 Nov 2023 03:00), Max: 38.2 (09 Nov 2023 20:00)  T(F): 99.5 (10 Nov 2023 03:00), Max: 100.8 (09 Nov 2023 20:00)  HR: 85 (10 Nov 2023 06:00) (80 - 96)  ABP: 113/70 (10 Nov 2023 06:00) (65/42 - 115/67)  ABP(mean): 93 (10 Nov 2023 06:00) (56 - 93)  RR: 18 (10 Nov 2023 06:00) (18 - 31)  SpO2: 93% (10 Nov 2023 06:00) (92% - 96%)    O2 Parameters below as of 10 Nov 2023 03:00  Patient On (Oxygen Delivery Method): ventilator    Mode: AC/ CMV (Assist Control/ Continuous Mandatory Ventilation)  RR (machine): 18  TV (machine): 500  FiO2: 30  PEEP: 5  ITime: 1  MAP: 10  PIP: 25    Adult Advanced Hemodynamics Last 24 Hrs  CVP(mm Hg): 18 (10 Nov 2023 06:00) (5 - 300)  CVP(cm H2O): --  CO: --  CI: --  PA: 54/34 (10 Nov 2023 06:00) (33/15 - 62/33)  PA(mean): 42 (10 Nov 2023 06:00) (24 - 45)  PCWP: --  SVR: --  SVRI: --  PVR: --  PVRI: --    CAPILLARY BLOOD GLUCOSE  POCT Blood Glucose.: 141 mg/dL (10 Nov 2023 06:11)  POCT Blood Glucose.: 139 mg/dL (10 Nov 2023 05:45)  POCT Blood Glucose.: 135 mg/dL (10 Nov 2023 01:42)  POCT Blood Glucose.: 135 mg/dL (10 Nov 2023 00:29)  POCT Blood Glucose.: 128 mg/dL (09 Nov 2023 23:48)  POCT Blood Glucose.: 130 mg/dL (09 Nov 2023 22:41)  POCT Blood Glucose.: 131 mg/dL (09 Nov 2023 21:34)  POCT Blood Glucose.: 123 mg/dL (09 Nov 2023 19:52)  POCT Blood Glucose.: 124 mg/dL (09 Nov 2023 18:35)  POCT Blood Glucose.: 125 mg/dL (09 Nov 2023 17:07)  POCT Blood Glucose.: 127 mg/dL (09 Nov 2023 16:01)  POCT Blood Glucose.: 122 mg/dL (09 Nov 2023 15:02)  POCT Blood Glucose.: 128 mg/dL (09 Nov 2023 14:07)  POCT Blood Glucose.: 190 mg/dL (09 Nov 2023 12:03)  POCT Blood Glucose.: 202 mg/dL (09 Nov 2023 11:12)  POCT Blood Glucose.: 244 mg/dL (09 Nov 2023 10:05)  POCT Blood Glucose.: 277 mg/dL (09 Nov 2023 09:07)  POCT Blood Glucose.: 312 mg/dL (09 Nov 2023 08:02)  POCT Blood Glucose.: 274 mg/dL (09 Nov 2023 06:55)    I&O's Summary    08 Nov 2023 07:01  -  09 Nov 2023 07:00  --------------------------------------------------------  IN: 1651 mL / OUT: 1780 mL / NET: -129 mL    09 Nov 2023 07:01  -  10 Nov 2023 06:38  --------------------------------------------------------  IN: 2038.6 mL / OUT: 1455 mL / NET: 583.6 mL     PHYSICAL EXAMINATION:  General: WN/WD NAD  HEENT: PERRLA, EOMI, moist mucous membranes  Neurology: A&Ox3, nonfocal, MOISE x 4  Respiratory: CTA B/L, normal respiratory effort, no wheezes, crackles, rales  CV: RRR, S1S2, no murmurs, rubs or gallops  Abdominal: Soft, NT, ND +BS, Last BM  Extremities: No edema, + peripheral pulses  Incisions:   Tubes:    LABS:  ABG - ( 10 Nov 2023 01:50 )  pH, Arterial: 7.37  pH, Blood: x     /  pCO2: 30    /  pO2: 128   / HCO3: 17    / Base Excess: -6.9  /  SaO2: 98.7                          11.2   11.47 )-----------( 266      ( 10 Nov 2023 02:10 )             33.0     11-10    133<L>  |  101  |  51<H>  ----------------------------<  131<H>  4.0   |  16<L>  |  3.76<H>    Ca    8.7      10 Nov 2023 02:10  Phos  5.8     11-10  Mg     3.0     11-10    TPro  6.8  /  Alb  3.1<L>  /  TBili  0.6  /  DBili  x   /  AST  103<H>  /  ALT  141<H>  /  AlkPhos  71  11-10    LIVER FUNCTIONS - ( 10 Nov 2023 02:10 )  Alb: 3.1 g/dL / Pro: 6.8 g/dL / ALK PHOS: 71 U/L / ALT: 141 U/L / AST: 103 U/L / GGT: x           PT/INR - ( 10 Nov 2023 02:10 )   PT: 14.1 sec;   INR: 1.29 ratio    PTT - ( 10 Nov 2023 02:10 )  PTT:95.9 sec    Urinalysis Basic - ( 10 Nov 2023 02:10 )    Color: x / Appearance: x / SG: x / pH: x  Gluc: 131 mg/dL / Ketone: x  / Bili: x / Urobili: x   Blood: x / Protein: x / Nitrite: x   Leuk Esterase: x / RBC: x / WBC x   Sq Epi: x / Non Sq Epi: x / Bacteria: x     PATIENT:  LD VALENCIA  [id number]    CHIEF COMPLAINT:  Patient is a 58y old male who presents with a chief complaint of SOB/CP (08 Nov 2023 19:19)      INTERVAL HISTORY/OVERNIGHT EVENTS:  - Quiet on tele  - Milrinone added for low mixed venous       REVIEW OF SYSTEMS:    [X] Unable to assess ROS because pt. is intubated and sedated    MEDICATIONS:  MEDICATIONS  (STANDING):  aMIOdarone Infusion 0.5 mG/Min (16.7 mL/Hr) IV Continuous <Continuous>  aspirin  chewable 81 milliGRAM(s) Oral daily  atorvastatin 80 milliGRAM(s) Oral at bedtime  budesonide  80 MICROgram(s)/formoterol 4.5 MICROgram(s) Inhaler 2 Puff(s) Inhalation two times a day  chlorhexidine 2% Cloths 1 Application(s) Topical daily  dexMEDEtomidine Infusion 0.5 MICROgram(s)/kG/Hr (12.4 mL/Hr) IV Continuous <Continuous>  dextrose 5% + sodium chloride 0.9%. 1000 milliLiter(s) (35 mL/Hr) IV Continuous <Continuous>  dextrose 50% Injectable 12.5 Gram(s) IV Push once  dextrose 50% Injectable 25 Gram(s) IV Push once  furosemide   Injectable 80 milliGRAM(s) IV Push daily  glucagon  Injectable 1 milliGRAM(s) IntraMuscular once  heparin  Infusion 1600 Unit(s)/Hr (15.5 mL/Hr) IV Continuous <Continuous>  insulin regular Infusion 3 Unit(s)/Hr (3 mL/Hr) IV Continuous <Continuous>  lidocaine   Infusion 2 mG/Min (15 mL/Hr) IV Continuous <Continuous>  milrinone Infusion 0.125 MICROgram(s)/kG/Min (3.71 mL/Hr) IV Continuous <Continuous>  pantoprazole  Injectable 40 milliGRAM(s) IV Push daily  propofol Infusion 20 MICROgram(s)/kG/Min (11.9 mL/Hr) IV Continuous <Continuous>  spironolactone 25 milliGRAM(s) Oral daily  ticagrelor 90 milliGRAM(s) Oral every 12 hours  tiotropium 2.5 MICROgram(s) Inhaler 2 Puff(s) Inhalation daily  vasopressin Infusion 0.06 Unit(s)/Min (9 mL/Hr) IV Continuous <Continuous>    MEDICATIONS  (PRN):  albuterol/ipratropium for Nebulization 3 milliLiter(s) Nebulizer every 6 hours PRN Shortness of Breath and/or Wheezing    ALLERGIES:  penicillins (Unknown)    OBJECTIVE:  ICU Vital Signs Last 24 Hrs  T(C): 37.5 (10 Nov 2023 03:00), Max: 38.2 (09 Nov 2023 20:00)  T(F): 99.5 (10 Nov 2023 03:00), Max: 100.8 (09 Nov 2023 20:00)  HR: 85 (10 Nov 2023 06:00) (80 - 96)  ABP: 113/70 (10 Nov 2023 06:00) (65/42 - 115/67)  ABP(mean): 93 (10 Nov 2023 06:00) (56 - 93)  RR: 18 (10 Nov 2023 06:00) (18 - 31)  SpO2: 93% (10 Nov 2023 06:00) (92% - 96%)    O2 Parameters below as of 10 Nov 2023 03:00  Patient On (Oxygen Delivery Method): ventilator    Mode: AC/ CMV (Assist Control/ Continuous Mandatory Ventilation)  RR (machine): 18  TV (machine): 500  FiO2: 30  PEEP: 5  ITime: 1  MAP: 10  PIP: 25    Adult Advanced Hemodynamics Last 24 Hrs  CVP(mm Hg): 18 (10 Nov 2023 06:00) (5 - 300)  CVP(cm H2O): --  CO: --  CI: --  PA: 54/34 (10 Nov 2023 06:00) (33/15 - 62/33)  PA(mean): 42 (10 Nov 2023 06:00) (24 - 45)    CAPILLARY BLOOD GLUCOSE  POCT Blood Glucose.: 141 mg/dL (10 Nov 2023 06:11)  POCT Blood Glucose.: 139 mg/dL (10 Nov 2023 05:45)  POCT Blood Glucose.: 135 mg/dL (10 Nov 2023 01:42)  POCT Blood Glucose.: 135 mg/dL (10 Nov 2023 00:29)  POCT Blood Glucose.: 128 mg/dL (09 Nov 2023 23:48)  POCT Blood Glucose.: 130 mg/dL (09 Nov 2023 22:41)  POCT Blood Glucose.: 131 mg/dL (09 Nov 2023 21:34)  POCT Blood Glucose.: 123 mg/dL (09 Nov 2023 19:52)  POCT Blood Glucose.: 124 mg/dL (09 Nov 2023 18:35)  POCT Blood Glucose.: 125 mg/dL (09 Nov 2023 17:07)  POCT Blood Glucose.: 127 mg/dL (09 Nov 2023 16:01)  POCT Blood Glucose.: 122 mg/dL (09 Nov 2023 15:02)  POCT Blood Glucose.: 128 mg/dL (09 Nov 2023 14:07)  POCT Blood Glucose.: 190 mg/dL (09 Nov 2023 12:03)  POCT Blood Glucose.: 202 mg/dL (09 Nov 2023 11:12)  POCT Blood Glucose.: 244 mg/dL (09 Nov 2023 10:05)  POCT Blood Glucose.: 277 mg/dL (09 Nov 2023 09:07)  POCT Blood Glucose.: 312 mg/dL (09 Nov 2023 08:02)  POCT Blood Glucose.: 274 mg/dL (09 Nov 2023 06:55)    I&O's Summary    08 Nov 2023 07:01  -  09 Nov 2023 07:00  --------------------------------------------------------  IN: 1651 mL / OUT: 1780 mL / NET: -129 mL    09 Nov 2023 07:01  -  10 Nov 2023 06:38  --------------------------------------------------------  IN: 2038.6 mL / OUT: 1455 mL / NET: 583.6 mL     PHYSICAL EXAMINATION:  General: Eye opening and following commands, in NAD  HEENT: PERRL, EOMI, moist mucous membranes  Neurology: Nonfocal, MOISE x 4  Respiratory: CTA B/L, normal respiratory effort, no wheezes, crackles, rales  CV: RRR, S1S2, no murmurs, rubs or gallops  Abdominal: Soft, NT, ND +BS  Extremities: Warm and dry, trace dependent edema, + peripheral pulses    LABS:  ABG - ( 10 Nov 2023 01:50 )  pH, Arterial: 7.37  pH, Blood: x     /  pCO2: 30    /  pO2: 128   / HCO3: 17    / Base Excess: -6.9  /  SaO2: 98.7                          11.2   11.47 )-----------( 266      ( 10 Nov 2023 02:10 )             33.0     11-10    133<L>  |  101  |  51<H>  ----------------------------<  131<H>  4.0   |  16<L>  |  3.76<H>    Ca    8.7      10 Nov 2023 02:10  Phos  5.8     11-10  Mg     3.0     11-10    TPro  6.8  /  Alb  3.1<L>  /  TBili  0.6  /  DBili  x   /  AST  103<H>  /  ALT  141<H>  /  AlkPhos  71  11-10    LIVER FUNCTIONS - ( 10 Nov 2023 02:10 )  Alb: 3.1 g/dL / Pro: 6.8 g/dL / ALK PHOS: 71 U/L / ALT: 141 U/L / AST: 103 U/L / GGT: x           PT/INR - ( 10 Nov 2023 02:10 )   PT: 14.1 sec;   INR: 1.29 ratio    PTT - ( 10 Nov 2023 02:10 )  PTT:95.9 sec    Urinalysis Basic - ( 10 Nov 2023 02:10 )    Color: x / Appearance: x / SG: x / pH: x  Gluc: 131 mg/dL / Ketone: x  / Bili: x / Urobili: x   Blood: x / Protein: x / Nitrite: x   Leuk Esterase: x / RBC: x / WBC x   Sq Epi: x / Non Sq Epi: x / Bacteria: x

## 2023-11-10 NOTE — PROGRESS NOTE ADULT - CRITICAL CARE ATTENDING COMMENT
History of CAD s/p PCI  Admitted with cardiogenic shock and respiratory failure in the setting of stent restenosis  Transferred out yesterday and overnight had VT/VF cardiac arrest  Intubated during arrest, ROSC achieved, readmitted to CICU, Mesick placed  Sedated with Precedex, follows commands - target RASS zero for potential extubation  Hold Seroquel and Ativan  Cardiogenic shock on IABP, decreasing SvO2 overnight, started on Milrinone, CI mid 2s   Maintain IABP and Milrinone  VT/VF arrest, on Amiodarone and Lidocaine infusions, ? if ischemia induced - place IABP  Continue DAPT with ASA/Ticagrelor for coronary disease for prior PCI  Acute hypoxic respiratory failure requiring mechanical ventilation, on minimal settings - wean to extubate  NPO  Non-oliguric ARIC, likely ATN from arrestfilling pressures acceptable - target even  H/H low but acceptable on Heparin drip for LV thrombus  Intermittently febrile, pan cultured overnight, no antibiotics - f/u cultures  Sugars controlled on Insulin drip  ANAY Farnsworth/rangel Roberts 11/9 History of CAD s/p PCI  Admitted with cardiogenic shock and respiratory failure in the setting of stent restenosis  Transferred out yesterday and overnight had VT/VF cardiac arrest  Intubated during arrest, ROSC achieved, readmitted to CICU, Eckerman placed  Sedated with Precedex, follows commands - target RASS zero for potential extubation  Hold Seroquel and Ativan  Cardiogenic shock on IABP, decreasing SvO2 overnight, started on Milrinone, CI mid 2s   Maintain IABP and Milrinone, restart Bidil for afterload reduction  VT/VF arrest, on Amiodarone and Lidocaine infusions, ? if ischemia induced - place IABP  Continue DAPT with ASA/Ticagrelor for coronary disease for prior PCI  Acute hypoxic respiratory failure requiring mechanical ventilation, on minimal settings - wean to extubate  NPO  Non-oliguric ARIC, likely ATN from arrestfilling pressures acceptable - target even  H/H low but acceptable on Heparin drip for LV thrombus  Intermittently febrile, pan cultured overnight, no antibiotics - f/u cultures  Sugars controlled on Insulin drip  ANAY Farnsworth/rangel Roberts 11/9

## 2023-11-10 NOTE — PROGRESS NOTE ADULT - PROBLEM SELECTOR PLAN 2
- PMVT into VF arrest 11/8, 3 rounds with shocks  - Currently on amio/lido/prop  - EP consulted for recs on management of NSVT/PMVT  - Will place IABP to limit myocardial work as above  - Will need 2ndary prev ICD prior to DC regardless.  - AT eval (11/10)

## 2023-11-10 NOTE — PROGRESS NOTE ADULT - ASSESSMENT
59 yo M with PMHx of HTN, CAD w/ 1 stent in 2009, ICH (2008) presented on 11/1 with abn ekg. Patient presented to [facility] where he was found to have STEMI, recommended to get cath however patient did not want to get it there so he left and came here.   EKG here with ST depression in lateral leads and elevation in anterior leads   Prior to C found to be tachycardic, dyspneic, intubated   C 11/1 with chronic total occlusion of LAD and RCA, with elevated RA and PA pressures  TTE 11/1 with severely decreased EF 32%, s/p IABP 11/1    RVP (11/1) Positive for COVID19  RVP (11/10) Negative  BCx (11/2, 11/4) NGTD  ETT Culture (11/2) NGTD  MRSA/MSSA PCR (11/2, 11/10) Negative  UA (11/10) 1 WBC    CXR (11/10) Clear Lungs  TTE (11/2) Left ventricular thrombus.    Antibiotic Course:  Vancomycin: 11/2  Cefepime: 11/2    #Pre-Heart Transplant Evaluation  --ID Clearance for OHT pending quantiferon gold, syphilis screen and preliminary blood cultures   --Recommend COVID19 Nucleocapsid Antibody  --Recommend COVID19 SpikeAntibody  --Recommend HAV IgG  --Recommend HBVs Ab, HBVsAg, HBVc Ab  --Recommend HCV Ab  --Recommend HSV 1/2 IgG  --Recommend EBV Serology  --Recommend CMV IgG  --Recommend VZV IgG  --Recommend Measles, Mumps and Rubella IgG serologies  --Recommend Quantiferon Gold  --Recommend HIV Test  --Recommend Syphilis Screen  --Recommend Toxoplasma IgG  --Recommend Strongyloides Ab  --Recommend Trypanosoma cruzii serology    #Fever, Leukocytosis  Favor noninfectious etiology of fever such as LV thrombus  --Monitor off of antibiotics  --Follow up on preliminary blood cultures    #COVID19  RVP (11/1) Positive for COVID19  RVP (11/10) Negative  Did not receive therapy for COVID19  Low suspicion that hypoxia currently is secondary to COVID19 (favor cardiogenic causes)  --Complete isolation per infection control protocol    I will continue to follow. Please feel free to contact me with any further questions.    Silviano Manuel M.D.  [facility] Division of Infectious Disease  8AM-5PM Monday - Friday: Available on [employer] Teams  After Hours and Holidays (or if no response on [employer] Teams): Please contact the Infectious Diseases Office at [phone] 59 yo M with PMHx of HTN, CAD w/ 1 stent in 2009, ICH (2008) presented on 11/1 with abn ekg. Patient presented to [facility] where he was found to have STEMI, recommended to get cath however patient did not want to get it there so he left and came here.   EKG here with ST depression in lateral leads and elevation in anterior leads   Prior to C found to be tachycardic, dyspneic, intubated   C 11/1 with chronic total occlusion of LAD and RCA, with elevated RA and PA pressures  TTE 11/1 with severely decreased EF 32%, s/p IABP 11/1    RVP (11/1) Positive for COVID19  RVP (11/10) Negative  BCx (11/2, 11/4) NGTD  ETT Culture (11/2) NGTD  MRSA/MSSA PCR (11/2, 11/10) Negative  UA (11/10) 1 WBC    CXR (11/10) Clear Lungs  TTE (11/2) Left ventricular thrombus.    Antibiotic Course:  Vancomycin: 11/2  Cefepime: 11/2    #Pre-Heart Transplant Evaluation  --ID Clearance for OHT pending quantiferon gold, syphilis screen and preliminary blood cultures   --Recommend COVID19 Nucleocapsid Antibody  --Recommend COVID19 [place] Antibody  --Recommend HAV IgG  --Recommend HBVs Ab, HBVsAg, HBVc Ab  --Recommend HCV Ab  --Recommend HSV 1/2 IgG  --Recommend EBV Serology  --Recommend CMV IgG  --Recommend VZV IgG  --Recommend Measles, Mumps and Rubella IgG serologies  --Recommend Quantiferon Gold  --Recommend HIV Test  --Recommend Syphilis Screen  --Recommend Toxoplasma IgG  --Recommend Strongyloides Ab  --Recommend Trypanosoma cruzii serology    #Fever, Leukocytosis  Favor noninfectious etiology of fever such as LV thrombus  Elevated procalcitonin noted but this is in context of ARIC  --Monitor off of antibiotics  --If clinical status changes would start Vancomycin + Cefepime  --Follow up on preliminary blood cultures    #COVID19  RVP (11/1) Positive for COVID19  RVP (11/10) Negative  Did not receive therapy for COVID19  Low suspicion that hypoxia currently is secondary to COVID19 (favor cardiogenic causes)  --Complete isolation per infection control protocol    I will be away starting tomorrow. Dr Courtney Peters will be covering tomorrow.     Silviano Manuel M.D.  [facility] Division of Infectious Disease  8AM-5PM Monday - Friday: Available on [employer] Teams  After Hours and Holidays (or if no response on [employer] Teams): Please contact the Infectious Diseases Office at [phone]     The above assessment and plan were discussed with CTICU NP

## 2023-11-10 NOTE — PROGRESS NOTE ADULT - SUBJECTIVE AND OBJECTIVE BOX
Follow Up:  Pre-Heart Transplant Evaluation    Interval History: febrile overnight and thus AM.     REVIEW OF SYSTEMS  [  ] ROS unobtainable because:    [ x ] All other systems negative except as noted below    Constitutional:  x[ ] fever [ ] chills  [ ] weight loss  [ ] weakness  Skin:  [ ] rash [ ] phlebitis	  Eyes: [ ] icterus [ ] pain  [ ] discharge	  ENMT: [ ] sore throat  [ ] thrush [ ] ulcers [ ] exudates  Respiratory: [ ] dyspnea [ ] hemoptysis [ ] cough [ ] sputum	  Cardiovascular:  [ ] chest pain [ ] palpitations [ ] edema	  Gastrointestinal:  [ ] nausea [ ] vomiting [ ] diarrhea [ ] constipation [ ] pain	  Genitourinary:  [ ] dysuria [ ] frequency [ ] hematuria [ ] discharge [ ] flank pain  [ ] incontinence  Musculoskeletal:  [ ] myalgias [ ] arthralgias [ ] arthritis  [ ] back pain  Neurological:  [ ] headache [ ] seizures  [ ] confusion/altered mental status    Allergies  penicillins (Unknown)        ANTIMICROBIALS:      OTHER MEDS:  MEDICATIONS  (STANDING):  acetaminophen   IVPB .. 1000 once  albuterol/ipratropium for Nebulization 3 every 6 hours PRN  aMIOdarone Infusion 0.5 <Continuous>  aspirin  chewable 81 daily  atorvastatin 80 at bedtime  budesonide  80 MICROgram(s)/formoterol 4.5 MICROgram(s) Inhaler 2 two times a day  buMETAnide Infusion 0.5 <Continuous>  dextrose 50% Injectable 25 once  dextrose 50% Injectable 12.5 once  glucagon  Injectable 1 once  heparin  Infusion 1600 <Continuous>  hydrALAZINE 25 three times a day  insulin regular Infusion 3 <Continuous>  isosorbide   dinitrate Tablet (ISORDIL) 10 three times a day  lidocaine   Infusion 2 <Continuous>  milrinone Infusion 0.125 <Continuous>  pantoprazole  Injectable 40 daily  spironolactone 25 daily  ticagrelor 90 every 12 hours  tiotropium 2.5 MICROgram(s) Inhaler 2 daily      Vital Signs Last 24 Hrs  T(C): 37.8 (10 Nov 2023 12:00), Max: 38.2 (09 Nov 2023 20:00)  T(F): 100 (10 Nov 2023 12:00), Max: 100.8 (09 Nov 2023 20:00)  HR: 99 (10 Nov 2023 15:00) (80 - 99)  BP: --  BP(mean): --  RR: 16 (10 Nov 2023 15:00) (15 - 26)  SpO2: 96% (10 Nov 2023 15:00) (92% - 96%)    Parameters below as of 10 Nov 2023 15:00  Patient On (Oxygen Delivery Method): nasal cannula, high flow  O2 Flow (L/min): 40  O2 Concentration (%): 40    PHYSICAL EXAMINATION:  General: Alert and Awake, NAD  HEENT: PERRL, EOMI  Neck: Supple  Cardiac: RRR, No M/R/G  Resp: CTAB, No Wh/Rh/Ra  Abdomen: NBS, NT/ND, No HSM, No rigidity or guarding  MSK: No LE edema. No Calf tenderness  : No holt  Skin: No rashes or lesions. Skin is warm and dry to the touch.   Neuro: Alert and Awake. CN 2-12 Grossly intact. Moves all four extremities spontaneously.  Psych: Calm, Pleasant, Cooperative                          11.2   11.47 )-----------( 266      ( 10 Nov 2023 02:10 )             33.0       11-10    133<L>  |  100  |  51<H>  ----------------------------<  112<H>  3.8   |  15<L>  |  4.03<H>    Ca    9.1      10 Nov 2023 08:52  Phos  5.8     11-10  Mg     2.9     11-10    TPro  7.2  /  Alb  3.2<L>  /  TBili  0.7  /  DBili  x   /  AST  113<H>  /  ALT  162<H>  /  AlkPhos  72  11-10      Urinalysis Basic - ( 10 Nov 2023 08:52 )    Color: x / Appearance: x / SG: x / pH: x  Gluc: 112 mg/dL / Ketone: x  / Bili: x / Urobili: x   Blood: x / Protein: x / Nitrite: x   Leuk Esterase: x / RBC: x / WBC x   Sq Epi: x / Non Sq Epi: x / Bacteria: x        MICROBIOLOGY:  v  .Blood Blood-Peripheral  11-04-23   No growth at 5 days  --  --      ET Tube ET Tube  11-02-23   No growth to date.  --    Rare polymorphonuclear leukocytes per low power field  No Squamous epithelial cells per low power field  No organisms seen per oil power field      .Blood Blood  11-02-23   No growth at 5 days  --  --      .Blood Blood  11-02-23   No growth at 5 days  --  --          Rapid RVP Result: NotDetec (11-10 @ 00:34)        RADIOLOGY:    <The imaging below has been reviewed and visualized by me independently. Findings as detailed in report below> Follow Up:  Pre-Heart Transplant Evaluation    Interval History: febrile overnight and this AM. noting chest pain from previous chest compressions.      REVIEW OF SYSTEMS  [  ] ROS unobtainable because:    [ x ] All other systems negative except as noted below    Constitutional:  [ x] fever [ ] chills  [ ] weight loss  [ ] weakness  Skin:  [ ] rash [ ] phlebitis	  Eyes: [ ] icterus [ ] pain  [ ] discharge	  ENMT: [ ] sore throat  [ ] thrush [ ] ulcers [ ] exudates  Respiratory: [ ] dyspnea [ ] hemoptysis [ ] cough [ ] sputum	  Cardiovascular:  [x ] chest pain [ ] palpitations [ ] edema	  Gastrointestinal:  [ ] nausea [ ] vomiting [ ] diarrhea [ ] constipation [ ] pain	  Genitourinary:  [ ] dysuria [ ] frequency [ ] hematuria [ ] discharge [ ] flank pain  [ ] incontinence  Musculoskeletal:  [ ] myalgias [ ] arthralgias [ ] arthritis  [ ] back pain  Neurological:  [ ] headache [ ] seizures  [ ] confusion/altered mental status    Allergies  penicillins (Unknown)        ANTIMICROBIALS:      OTHER MEDS:  MEDICATIONS  (STANDING):  acetaminophen   IVPB .. 1000 once  albuterol/ipratropium for Nebulization 3 every 6 hours PRN  aMIOdarone Infusion 0.5 <Continuous>  aspirin  chewable 81 daily  atorvastatin 80 at bedtime  budesonide  80 MICROgram(s)/formoterol 4.5 MICROgram(s) Inhaler 2 two times a day  buMETAnide Infusion 0.5 <Continuous>  dextrose 50% Injectable 25 once  dextrose 50% Injectable 12.5 once  glucagon  Injectable 1 once  heparin  Infusion 1600 <Continuous>  hydrALAZINE 25 three times a day  insulin regular Infusion 3 <Continuous>  isosorbide   dinitrate Tablet (ISORDIL) 10 three times a day  lidocaine   Infusion 2 <Continuous>  milrinone Infusion 0.125 <Continuous>  pantoprazole  Injectable 40 daily  spironolactone 25 daily  ticagrelor 90 every 12 hours  tiotropium 2.5 MICROgram(s) Inhaler 2 daily      Vital Signs Last 24 Hrs  T(C): 37.8 (10 Nov 2023 12:00), Max: 38.2 (09 Nov 2023 20:00)  T(F): 100 (10 Nov 2023 12:00), Max: 100.8 (09 Nov 2023 20:00)  HR: 99 (10 Nov 2023 15:00) (80 - 99)  BP: --  BP(mean): --  RR: 16 (10 Nov 2023 15:00) (15 - 26)  SpO2: 96% (10 Nov 2023 15:00) (92% - 96%)    Parameters below as of 10 Nov 2023 15:00  Patient On (Oxygen Delivery Method): nasal cannula, high flow  O2 Flow (L/min): 40  O2 Concentration (%): 40    PHYSICAL EXAMINATION:  General: Alert and Awake, NAD  HEENT: PERRL, EOMI, +Hi Flow NC  Neck: Supple  Cardiac: RRR, No M/R/G  Resp: CTAB, No Wh/Rh/Ra  Abdomen: NBS, NT/ND, No HSM, No rigidity or guarding  MSK: No LE edema. No Calf tenderness  : L Groin Balloon Pump (No surrounding erythema, drainage or tenderness to palpation)  Skin: No rashes or lesions. Skin is warm and dry to the touch.   Neuro: Alert and Awake. CN 2-12 Grossly intact. Moves all four extremities spontaneously.  Psych: Calm, Pleasant, Cooperative                          11.2   11.47 )-----------( 266      ( 10 Nov 2023 02:10 )             33.0       11-10    133<L>  |  100  |  51<H>  ----------------------------<  112<H>  3.8   |  15<L>  |  4.03<H>    Ca    9.1      10 Nov 2023 08:52  Phos  5.8     11-10  Mg     2.9     11-10    TPro  7.2  /  Alb  3.2<L>  /  TBili  0.7  /  DBili  x   /  AST  113<H>  /  ALT  162<H>  /  AlkPhos  72  11-10      Urinalysis Basic - ( 10 Nov 2023 08:52 )    Color: x / Appearance: x / SG: x / pH: x  Gluc: 112 mg/dL / Ketone: x  / Bili: x / Urobili: x   Blood: x / Protein: x / Nitrite: x   Leuk Esterase: x / RBC: x / WBC x   Sq Epi: x / Non Sq Epi: x / Bacteria: x        MICROBIOLOGY:  v  .Blood Blood-Peripheral  11-04-23   No growth at 5 days  --  --      ET Tube ET Tube  11-02-23   No growth to date.  --    Rare polymorphonuclear leukocytes per low power field  No Squamous epithelial cells per low power field  No organisms seen per oil power field      .Blood Blood  11-02-23   No growth at 5 days  --  --      .Blood Blood  11-02-23   No growth at 5 days  --  --          Rapid RVP Result: NotDetec (11-10 @ 00:34)        RADIOLOGY:    <The imaging below has been reviewed and visualized by me independently. Findings as detailed in report below>    < from: US Abdomen Complete (11.10.23 @ 16:52) >  IMPRESSION:  Limited study due to overlying dressing and bowel gas.    Limited visualization of the aorta, IVC and iliac vasculature.    < end of copied text >

## 2023-11-11 LAB
ALBUMIN SERPL ELPH-MCNC: 2.7 G/DL — LOW (ref 3.3–5)
ALBUMIN SERPL ELPH-MCNC: 2.7 G/DL — LOW (ref 3.3–5)
ALBUMIN SERPL ELPH-MCNC: 3.1 G/DL — LOW (ref 3.3–5)
ALBUMIN SERPL ELPH-MCNC: 3.1 G/DL — LOW (ref 3.3–5)
ALBUMIN SERPL ELPH-MCNC: 3.3 G/DL — SIGNIFICANT CHANGE UP (ref 3.3–5)
ALBUMIN SERPL ELPH-MCNC: 3.3 G/DL — SIGNIFICANT CHANGE UP (ref 3.3–5)
ALP SERPL-CCNC: 62 U/L — SIGNIFICANT CHANGE UP (ref 40–120)
ALP SERPL-CCNC: 62 U/L — SIGNIFICANT CHANGE UP (ref 40–120)
ALP SERPL-CCNC: 67 U/L — SIGNIFICANT CHANGE UP (ref 40–120)
ALP SERPL-CCNC: 67 U/L — SIGNIFICANT CHANGE UP (ref 40–120)
ALP SERPL-CCNC: 70 U/L — SIGNIFICANT CHANGE UP (ref 40–120)
ALP SERPL-CCNC: 70 U/L — SIGNIFICANT CHANGE UP (ref 40–120)
ALT FLD-CCNC: 107 U/L — HIGH (ref 10–45)
ALT FLD-CCNC: 107 U/L — HIGH (ref 10–45)
ALT FLD-CCNC: 111 U/L — HIGH (ref 10–45)
ALT FLD-CCNC: 111 U/L — HIGH (ref 10–45)
ALT FLD-CCNC: 132 U/L — HIGH (ref 10–45)
ALT FLD-CCNC: 132 U/L — HIGH (ref 10–45)
ANION GAP SERPL CALC-SCNC: 12 MMOL/L — SIGNIFICANT CHANGE UP (ref 5–17)
ANION GAP SERPL CALC-SCNC: 12 MMOL/L — SIGNIFICANT CHANGE UP (ref 5–17)
ANION GAP SERPL CALC-SCNC: 14 MMOL/L — SIGNIFICANT CHANGE UP (ref 5–17)
ANION GAP SERPL CALC-SCNC: 14 MMOL/L — SIGNIFICANT CHANGE UP (ref 5–17)
ANION GAP SERPL CALC-SCNC: 15 MMOL/L — SIGNIFICANT CHANGE UP (ref 5–17)
ANION GAP SERPL CALC-SCNC: 15 MMOL/L — SIGNIFICANT CHANGE UP (ref 5–17)
APTT BLD: 57.3 SEC — HIGH (ref 24.5–35.6)
APTT BLD: 57.3 SEC — HIGH (ref 24.5–35.6)
APTT BLD: 59.2 SEC — HIGH (ref 24.5–35.6)
APTT BLD: 59.2 SEC — HIGH (ref 24.5–35.6)
APTT BLD: 60.9 SEC — HIGH (ref 24.5–35.6)
APTT BLD: 60.9 SEC — HIGH (ref 24.5–35.6)
APTT BLD: 61.2 SEC — HIGH (ref 24.5–35.6)
APTT BLD: 61.2 SEC — HIGH (ref 24.5–35.6)
AST SERPL-CCNC: 56 U/L — HIGH (ref 10–40)
AST SERPL-CCNC: 56 U/L — HIGH (ref 10–40)
AST SERPL-CCNC: 62 U/L — HIGH (ref 10–40)
AST SERPL-CCNC: 62 U/L — HIGH (ref 10–40)
AST SERPL-CCNC: 78 U/L — HIGH (ref 10–40)
AST SERPL-CCNC: 78 U/L — HIGH (ref 10–40)
BASE EXCESS BLDMV CALC-SCNC: -3.3 MMOL/L — LOW (ref -3–3)
BASE EXCESS BLDMV CALC-SCNC: -3.3 MMOL/L — LOW (ref -3–3)
BASE EXCESS BLDMV CALC-SCNC: -4 MMOL/L — LOW (ref -3–3)
BASE EXCESS BLDMV CALC-SCNC: -4 MMOL/L — LOW (ref -3–3)
BASE EXCESS BLDMV CALC-SCNC: -4.4 MMOL/L — LOW (ref -3–3)
BASE EXCESS BLDMV CALC-SCNC: -4.4 MMOL/L — LOW (ref -3–3)
BASOPHILS # BLD AUTO: 0.04 K/UL — SIGNIFICANT CHANGE UP (ref 0–0.2)
BASOPHILS # BLD AUTO: 0.04 K/UL — SIGNIFICANT CHANGE UP (ref 0–0.2)
BASOPHILS NFR BLD AUTO: 0.3 % — SIGNIFICANT CHANGE UP (ref 0–2)
BASOPHILS NFR BLD AUTO: 0.3 % — SIGNIFICANT CHANGE UP (ref 0–2)
BILIRUB SERPL-MCNC: 0.8 MG/DL — SIGNIFICANT CHANGE UP (ref 0.2–1.2)
BILIRUB SERPL-MCNC: 0.8 MG/DL — SIGNIFICANT CHANGE UP (ref 0.2–1.2)
BILIRUB SERPL-MCNC: 0.9 MG/DL — SIGNIFICANT CHANGE UP (ref 0.2–1.2)
BUN SERPL-MCNC: 44 MG/DL — HIGH (ref 7–23)
BUN SERPL-MCNC: 44 MG/DL — HIGH (ref 7–23)
BUN SERPL-MCNC: 47 MG/DL — HIGH (ref 7–23)
BUN SERPL-MCNC: 47 MG/DL — HIGH (ref 7–23)
BUN SERPL-MCNC: 53 MG/DL — HIGH (ref 7–23)
BUN SERPL-MCNC: 53 MG/DL — HIGH (ref 7–23)
CALCIUM SERPL-MCNC: 8.4 MG/DL — SIGNIFICANT CHANGE UP (ref 8.4–10.5)
CALCIUM SERPL-MCNC: 8.4 MG/DL — SIGNIFICANT CHANGE UP (ref 8.4–10.5)
CALCIUM SERPL-MCNC: 8.8 MG/DL — SIGNIFICANT CHANGE UP (ref 8.4–10.5)
CHLORIDE SERPL-SCNC: 100 MMOL/L — SIGNIFICANT CHANGE UP (ref 96–108)
CHLORIDE SERPL-SCNC: 100 MMOL/L — SIGNIFICANT CHANGE UP (ref 96–108)
CHLORIDE SERPL-SCNC: 102 MMOL/L — SIGNIFICANT CHANGE UP (ref 96–108)
CHLORIDE SERPL-SCNC: 102 MMOL/L — SIGNIFICANT CHANGE UP (ref 96–108)
CHLORIDE SERPL-SCNC: 103 MMOL/L — SIGNIFICANT CHANGE UP (ref 96–108)
CHLORIDE SERPL-SCNC: 103 MMOL/L — SIGNIFICANT CHANGE UP (ref 96–108)
CMV IGG FLD QL: 4.2 U/ML — HIGH
CMV IGG FLD QL: 4.2 U/ML — HIGH
CMV IGG SERPL-IMP: POSITIVE
CMV IGG SERPL-IMP: POSITIVE
CO2 BLDMV-SCNC: 21 MMOL/L — SIGNIFICANT CHANGE UP (ref 21–29)
CO2 BLDMV-SCNC: 21 MMOL/L — SIGNIFICANT CHANGE UP (ref 21–29)
CO2 BLDMV-SCNC: 22 MMOL/L — SIGNIFICANT CHANGE UP (ref 21–29)
CO2 SERPL-SCNC: 16 MMOL/L — LOW (ref 22–31)
CO2 SERPL-SCNC: 16 MMOL/L — LOW (ref 22–31)
CO2 SERPL-SCNC: 18 MMOL/L — LOW (ref 22–31)
CO2 SERPL-SCNC: 18 MMOL/L — LOW (ref 22–31)
CO2 SERPL-SCNC: 19 MMOL/L — LOW (ref 22–31)
CO2 SERPL-SCNC: 19 MMOL/L — LOW (ref 22–31)
CREAT SERPL-MCNC: 2.9 MG/DL — HIGH (ref 0.5–1.3)
CREAT SERPL-MCNC: 2.9 MG/DL — HIGH (ref 0.5–1.3)
CREAT SERPL-MCNC: 3.5 MG/DL — HIGH (ref 0.5–1.3)
CREAT SERPL-MCNC: 3.5 MG/DL — HIGH (ref 0.5–1.3)
CREAT SERPL-MCNC: 3.92 MG/DL — HIGH (ref 0.5–1.3)
CREAT SERPL-MCNC: 3.92 MG/DL — HIGH (ref 0.5–1.3)
EBV EA AB SER IA-ACNC: 9.7 U/ML — HIGH
EBV EA AB SER IA-ACNC: 9.7 U/ML — HIGH
EBV EA AB TITR SER IF: POSITIVE
EBV EA AB TITR SER IF: POSITIVE
EBV EA IGG SER-ACNC: ABNORMAL
EBV EA IGG SER-ACNC: ABNORMAL
EBV NA IGG SER IA-ACNC: 84.2 U/ML — HIGH
EBV NA IGG SER IA-ACNC: 84.2 U/ML — HIGH
EBV PATRN SPEC IB-IMP: SIGNIFICANT CHANGE UP
EBV PATRN SPEC IB-IMP: SIGNIFICANT CHANGE UP
EBV VCA IGG AVIDITY SER QL IA: POSITIVE
EBV VCA IGG AVIDITY SER QL IA: POSITIVE
EBV VCA IGM SER IA-ACNC: 475 U/ML — HIGH
EBV VCA IGM SER IA-ACNC: 475 U/ML — HIGH
EBV VCA IGM SER IA-ACNC: <10 U/ML — SIGNIFICANT CHANGE UP
EBV VCA IGM SER IA-ACNC: <10 U/ML — SIGNIFICANT CHANGE UP
EBV VCA IGM TITR FLD: NEGATIVE — SIGNIFICANT CHANGE UP
EBV VCA IGM TITR FLD: NEGATIVE — SIGNIFICANT CHANGE UP
EGFR: 17 ML/MIN/1.73M2 — LOW
EGFR: 17 ML/MIN/1.73M2 — LOW
EGFR: 19 ML/MIN/1.73M2 — LOW
EGFR: 19 ML/MIN/1.73M2 — LOW
EGFR: 24 ML/MIN/1.73M2 — LOW
EGFR: 24 ML/MIN/1.73M2 — LOW
EOSINOPHIL # BLD AUTO: 0.13 K/UL — SIGNIFICANT CHANGE UP (ref 0–0.5)
EOSINOPHIL # BLD AUTO: 0.13 K/UL — SIGNIFICANT CHANGE UP (ref 0–0.5)
EOSINOPHIL NFR BLD AUTO: 1 % — SIGNIFICANT CHANGE UP (ref 0–6)
EOSINOPHIL NFR BLD AUTO: 1 % — SIGNIFICANT CHANGE UP (ref 0–6)
ERYTHROCYTE [SEDIMENTATION RATE] IN BLOOD: 99 MM/HR — HIGH (ref 0–20)
ERYTHROCYTE [SEDIMENTATION RATE] IN BLOOD: 99 MM/HR — HIGH (ref 0–20)
GAS PNL BLDA: SIGNIFICANT CHANGE UP
GAS PNL BLDMV: SIGNIFICANT CHANGE UP
GLUCOSE BLDC GLUCOMTR-MCNC: 109 MG/DL — HIGH (ref 70–99)
GLUCOSE BLDC GLUCOMTR-MCNC: 109 MG/DL — HIGH (ref 70–99)
GLUCOSE BLDC GLUCOMTR-MCNC: 112 MG/DL — HIGH (ref 70–99)
GLUCOSE BLDC GLUCOMTR-MCNC: 118 MG/DL — HIGH (ref 70–99)
GLUCOSE BLDC GLUCOMTR-MCNC: 118 MG/DL — HIGH (ref 70–99)
GLUCOSE BLDC GLUCOMTR-MCNC: 119 MG/DL — HIGH (ref 70–99)
GLUCOSE BLDC GLUCOMTR-MCNC: 119 MG/DL — HIGH (ref 70–99)
GLUCOSE BLDC GLUCOMTR-MCNC: 122 MG/DL — HIGH (ref 70–99)
GLUCOSE BLDC GLUCOMTR-MCNC: 122 MG/DL — HIGH (ref 70–99)
GLUCOSE BLDC GLUCOMTR-MCNC: 129 MG/DL — HIGH (ref 70–99)
GLUCOSE BLDC GLUCOMTR-MCNC: 129 MG/DL — HIGH (ref 70–99)
GLUCOSE BLDC GLUCOMTR-MCNC: 135 MG/DL — HIGH (ref 70–99)
GLUCOSE BLDC GLUCOMTR-MCNC: 135 MG/DL — HIGH (ref 70–99)
GLUCOSE BLDC GLUCOMTR-MCNC: 137 MG/DL — HIGH (ref 70–99)
GLUCOSE BLDC GLUCOMTR-MCNC: 137 MG/DL — HIGH (ref 70–99)
GLUCOSE BLDC GLUCOMTR-MCNC: 151 MG/DL — HIGH (ref 70–99)
GLUCOSE BLDC GLUCOMTR-MCNC: 151 MG/DL — HIGH (ref 70–99)
GLUCOSE BLDC GLUCOMTR-MCNC: 152 MG/DL — HIGH (ref 70–99)
GLUCOSE BLDC GLUCOMTR-MCNC: 152 MG/DL — HIGH (ref 70–99)
GLUCOSE BLDC GLUCOMTR-MCNC: 168 MG/DL — HIGH (ref 70–99)
GLUCOSE BLDC GLUCOMTR-MCNC: 168 MG/DL — HIGH (ref 70–99)
GLUCOSE BLDC GLUCOMTR-MCNC: 183 MG/DL — HIGH (ref 70–99)
GLUCOSE BLDC GLUCOMTR-MCNC: 183 MG/DL — HIGH (ref 70–99)
GLUCOSE BLDC GLUCOMTR-MCNC: 207 MG/DL — HIGH (ref 70–99)
GLUCOSE BLDC GLUCOMTR-MCNC: 207 MG/DL — HIGH (ref 70–99)
GLUCOSE SERPL-MCNC: 113 MG/DL — HIGH (ref 70–99)
GLUCOSE SERPL-MCNC: 113 MG/DL — HIGH (ref 70–99)
GLUCOSE SERPL-MCNC: 150 MG/DL — HIGH (ref 70–99)
GLUCOSE SERPL-MCNC: 150 MG/DL — HIGH (ref 70–99)
GLUCOSE SERPL-MCNC: 194 MG/DL — HIGH (ref 70–99)
GLUCOSE SERPL-MCNC: 194 MG/DL — HIGH (ref 70–99)
HAV IGG SER QL IA: SIGNIFICANT CHANGE UP
HAV IGG SER QL IA: SIGNIFICANT CHANGE UP
HBV CORE AB SER-ACNC: SIGNIFICANT CHANGE UP
HBV CORE AB SER-ACNC: SIGNIFICANT CHANGE UP
HBV SURFACE AB SER-ACNC: SIGNIFICANT CHANGE UP
HBV SURFACE AB SER-ACNC: SIGNIFICANT CHANGE UP
HCO3 BLDMV-SCNC: 20 MMOL/L — SIGNIFICANT CHANGE UP (ref 20–28)
HCO3 BLDMV-SCNC: 20 MMOL/L — SIGNIFICANT CHANGE UP (ref 20–28)
HCO3 BLDMV-SCNC: 21 MMOL/L — SIGNIFICANT CHANGE UP (ref 20–28)
HCT VFR BLD CALC: 34.2 % — LOW (ref 39–50)
HCT VFR BLD CALC: 34.2 % — LOW (ref 39–50)
HCV AB S/CO SERPL IA: 0.09 S/CO — SIGNIFICANT CHANGE UP (ref 0–0.99)
HCV AB S/CO SERPL IA: 0.09 S/CO — SIGNIFICANT CHANGE UP (ref 0–0.99)
HCV AB SERPL-IMP: SIGNIFICANT CHANGE UP
HCV AB SERPL-IMP: SIGNIFICANT CHANGE UP
HCV RNA SPEC NAA+PROBE-LOG IU: SIGNIFICANT CHANGE UP
HCV RNA SPEC NAA+PROBE-LOG IU: SIGNIFICANT CHANGE UP
HCV RNA SPEC NAA+PROBE-LOG IU: SIGNIFICANT CHANGE UP LOGIU/ML
HCV RNA SPEC NAA+PROBE-LOG IU: SIGNIFICANT CHANGE UP LOGIU/ML
HGB BLD-MCNC: 11.5 G/DL — LOW (ref 13–17)
HGB BLD-MCNC: 11.5 G/DL — LOW (ref 13–17)
HIV-1 VIRAL LOAD RESULT: SIGNIFICANT CHANGE UP
HIV-1 VIRAL LOAD RESULT: SIGNIFICANT CHANGE UP
HIV1 RNA # SERPL NAA+PROBE: SIGNIFICANT CHANGE UP COPIES/ML
HIV1 RNA # SERPL NAA+PROBE: SIGNIFICANT CHANGE UP COPIES/ML
HIV1 RNA SER-IMP: SIGNIFICANT CHANGE UP
HIV1 RNA SER-IMP: SIGNIFICANT CHANGE UP
HIV1 RNA SERPL NAA+PROBE-ACNC: SIGNIFICANT CHANGE UP
HIV1 RNA SERPL NAA+PROBE-ACNC: SIGNIFICANT CHANGE UP
HIV1 RNA SERPL NAA+PROBE-LOG#: SIGNIFICANT CHANGE UP LG COP/ML
HIV1 RNA SERPL NAA+PROBE-LOG#: SIGNIFICANT CHANGE UP LG COP/ML
HOROWITZ INDEX BLDMV+IHG-RTO: 21 — SIGNIFICANT CHANGE UP
HOROWITZ INDEX BLDMV+IHG-RTO: 21 — SIGNIFICANT CHANGE UP
HOROWITZ INDEX BLDMV+IHG-RTO: 28 — SIGNIFICANT CHANGE UP
HOROWITZ INDEX BLDMV+IHG-RTO: 28 — SIGNIFICANT CHANGE UP
HSV1 IGG SER-ACNC: 43.9 INDEX — HIGH
HSV1 IGG SER-ACNC: 43.9 INDEX — HIGH
HSV1 IGG SERPL QL IA: POSITIVE
HSV1 IGG SERPL QL IA: POSITIVE
HSV2 IGG FLD-ACNC: 0.15 INDEX — SIGNIFICANT CHANGE UP
HSV2 IGG FLD-ACNC: 0.15 INDEX — SIGNIFICANT CHANGE UP
HSV2 IGG SERPL QL IA: NEGATIVE — SIGNIFICANT CHANGE UP
HSV2 IGG SERPL QL IA: NEGATIVE — SIGNIFICANT CHANGE UP
IMM GRANULOCYTES NFR BLD AUTO: 0.5 % — SIGNIFICANT CHANGE UP (ref 0–0.9)
IMM GRANULOCYTES NFR BLD AUTO: 0.5 % — SIGNIFICANT CHANGE UP (ref 0–0.9)
INR BLD: 1.44 RATIO — HIGH (ref 0.85–1.18)
INR BLD: 1.44 RATIO — HIGH (ref 0.85–1.18)
LYMPHOCYTES # BLD AUTO: 1.42 K/UL — SIGNIFICANT CHANGE UP (ref 1–3.3)
LYMPHOCYTES # BLD AUTO: 1.42 K/UL — SIGNIFICANT CHANGE UP (ref 1–3.3)
LYMPHOCYTES # BLD AUTO: 11.1 % — LOW (ref 13–44)
LYMPHOCYTES # BLD AUTO: 11.1 % — LOW (ref 13–44)
MAGNESIUM SERPL-MCNC: 2.2 MG/DL — SIGNIFICANT CHANGE UP (ref 1.6–2.6)
MAGNESIUM SERPL-MCNC: 2.2 MG/DL — SIGNIFICANT CHANGE UP (ref 1.6–2.6)
MAGNESIUM SERPL-MCNC: 2.4 MG/DL — SIGNIFICANT CHANGE UP (ref 1.6–2.6)
MAGNESIUM SERPL-MCNC: 2.4 MG/DL — SIGNIFICANT CHANGE UP (ref 1.6–2.6)
MAGNESIUM SERPL-MCNC: 2.5 MG/DL — SIGNIFICANT CHANGE UP (ref 1.6–2.6)
MAGNESIUM SERPL-MCNC: 2.5 MG/DL — SIGNIFICANT CHANGE UP (ref 1.6–2.6)
MCHC RBC-ENTMCNC: 28.8 PG — SIGNIFICANT CHANGE UP (ref 27–34)
MCHC RBC-ENTMCNC: 28.8 PG — SIGNIFICANT CHANGE UP (ref 27–34)
MCHC RBC-ENTMCNC: 33.6 GM/DL — SIGNIFICANT CHANGE UP (ref 32–36)
MCHC RBC-ENTMCNC: 33.6 GM/DL — SIGNIFICANT CHANGE UP (ref 32–36)
MCV RBC AUTO: 85.5 FL — SIGNIFICANT CHANGE UP (ref 80–100)
MCV RBC AUTO: 85.5 FL — SIGNIFICANT CHANGE UP (ref 80–100)
MEV IGG SER-ACNC: 294 AU/ML — SIGNIFICANT CHANGE UP
MEV IGG SER-ACNC: 294 AU/ML — SIGNIFICANT CHANGE UP
MEV IGG+IGM SER-IMP: POSITIVE — SIGNIFICANT CHANGE UP
MEV IGG+IGM SER-IMP: POSITIVE — SIGNIFICANT CHANGE UP
MONOCYTES # BLD AUTO: 1.55 K/UL — HIGH (ref 0–0.9)
MONOCYTES # BLD AUTO: 1.55 K/UL — HIGH (ref 0–0.9)
MONOCYTES NFR BLD AUTO: 12.1 % — SIGNIFICANT CHANGE UP (ref 2–14)
MONOCYTES NFR BLD AUTO: 12.1 % — SIGNIFICANT CHANGE UP (ref 2–14)
MUV AB SER-ACNC: NEGATIVE — SIGNIFICANT CHANGE UP
MUV AB SER-ACNC: NEGATIVE — SIGNIFICANT CHANGE UP
MUV IGG FLD-ACNC: 5.1 AU/ML — SIGNIFICANT CHANGE UP
MUV IGG FLD-ACNC: 5.1 AU/ML — SIGNIFICANT CHANGE UP
NEUTROPHILS # BLD AUTO: 9.64 K/UL — HIGH (ref 1.8–7.4)
NEUTROPHILS # BLD AUTO: 9.64 K/UL — HIGH (ref 1.8–7.4)
NEUTROPHILS NFR BLD AUTO: 75 % — SIGNIFICANT CHANGE UP (ref 43–77)
NEUTROPHILS NFR BLD AUTO: 75 % — SIGNIFICANT CHANGE UP (ref 43–77)
NRBC # BLD: 0 /100 WBCS — SIGNIFICANT CHANGE UP (ref 0–0)
NRBC # BLD: 0 /100 WBCS — SIGNIFICANT CHANGE UP (ref 0–0)
O2 CT VFR BLD CALC: 49 MMHG — SIGNIFICANT CHANGE UP (ref 30–65)
O2 CT VFR BLD CALC: 49 MMHG — SIGNIFICANT CHANGE UP (ref 30–65)
O2 CT VFR BLD CALC: 50 MMHG — SIGNIFICANT CHANGE UP (ref 30–65)
O2 CT VFR BLD CALC: 50 MMHG — SIGNIFICANT CHANGE UP (ref 30–65)
O2 CT VFR BLD CALC: 51 MMHG — SIGNIFICANT CHANGE UP (ref 30–65)
O2 CT VFR BLD CALC: 51 MMHG — SIGNIFICANT CHANGE UP (ref 30–65)
PCO2 BLDMV: 35 MMHG — SIGNIFICANT CHANGE UP (ref 30–65)
PCO2 BLDMV: 35 MMHG — SIGNIFICANT CHANGE UP (ref 30–65)
PCO2 BLDMV: 36 MMHG — SIGNIFICANT CHANGE UP (ref 30–65)
PCO2 BLDMV: 36 MMHG — SIGNIFICANT CHANGE UP (ref 30–65)
PCO2 BLDMV: 37 MMHG — SIGNIFICANT CHANGE UP (ref 30–65)
PCO2 BLDMV: 37 MMHG — SIGNIFICANT CHANGE UP (ref 30–65)
PH BLDMV: 7.36 — SIGNIFICANT CHANGE UP (ref 7.32–7.45)
PH BLDMV: 7.36 — SIGNIFICANT CHANGE UP (ref 7.32–7.45)
PH BLDMV: 7.37 — SIGNIFICANT CHANGE UP (ref 7.32–7.45)
PH BLDMV: 7.37 — SIGNIFICANT CHANGE UP (ref 7.32–7.45)
PH BLDMV: 7.38 — SIGNIFICANT CHANGE UP (ref 7.32–7.45)
PH BLDMV: 7.38 — SIGNIFICANT CHANGE UP (ref 7.32–7.45)
PHOSPHATE SERPL-MCNC: 3.7 MG/DL — SIGNIFICANT CHANGE UP (ref 2.5–4.5)
PHOSPHATE SERPL-MCNC: 4.7 MG/DL — HIGH (ref 2.5–4.5)
PHOSPHATE SERPL-MCNC: 4.7 MG/DL — HIGH (ref 2.5–4.5)
PLATELET # BLD AUTO: 266 K/UL — SIGNIFICANT CHANGE UP (ref 150–400)
PLATELET # BLD AUTO: 266 K/UL — SIGNIFICANT CHANGE UP (ref 150–400)
POTASSIUM SERPL-MCNC: 3.8 MMOL/L — SIGNIFICANT CHANGE UP (ref 3.5–5.3)
POTASSIUM SERPL-MCNC: 3.8 MMOL/L — SIGNIFICANT CHANGE UP (ref 3.5–5.3)
POTASSIUM SERPL-MCNC: 3.9 MMOL/L — SIGNIFICANT CHANGE UP (ref 3.5–5.3)
POTASSIUM SERPL-MCNC: 3.9 MMOL/L — SIGNIFICANT CHANGE UP (ref 3.5–5.3)
POTASSIUM SERPL-MCNC: 4.2 MMOL/L — SIGNIFICANT CHANGE UP (ref 3.5–5.3)
POTASSIUM SERPL-MCNC: 4.2 MMOL/L — SIGNIFICANT CHANGE UP (ref 3.5–5.3)
POTASSIUM SERPL-SCNC: 3.8 MMOL/L — SIGNIFICANT CHANGE UP (ref 3.5–5.3)
POTASSIUM SERPL-SCNC: 3.8 MMOL/L — SIGNIFICANT CHANGE UP (ref 3.5–5.3)
POTASSIUM SERPL-SCNC: 3.9 MMOL/L — SIGNIFICANT CHANGE UP (ref 3.5–5.3)
POTASSIUM SERPL-SCNC: 3.9 MMOL/L — SIGNIFICANT CHANGE UP (ref 3.5–5.3)
POTASSIUM SERPL-SCNC: 4.2 MMOL/L — SIGNIFICANT CHANGE UP (ref 3.5–5.3)
POTASSIUM SERPL-SCNC: 4.2 MMOL/L — SIGNIFICANT CHANGE UP (ref 3.5–5.3)
PREALB SERPL-MCNC: 22 MG/DL — SIGNIFICANT CHANGE UP (ref 20–40)
PREALB SERPL-MCNC: 22 MG/DL — SIGNIFICANT CHANGE UP (ref 20–40)
PROT SERPL-MCNC: 6.4 G/DL — SIGNIFICANT CHANGE UP (ref 6–8.3)
PROT SERPL-MCNC: 6.4 G/DL — SIGNIFICANT CHANGE UP (ref 6–8.3)
PROT SERPL-MCNC: 6.9 G/DL — SIGNIFICANT CHANGE UP (ref 6–8.3)
PROT SERPL-MCNC: 6.9 G/DL — SIGNIFICANT CHANGE UP (ref 6–8.3)
PROT SERPL-MCNC: 7 G/DL — SIGNIFICANT CHANGE UP (ref 6–8.3)
PROT SERPL-MCNC: 7 G/DL — SIGNIFICANT CHANGE UP (ref 6–8.3)
PROTHROM AB SERPL-ACNC: 15 SEC — HIGH (ref 9.5–13)
PROTHROM AB SERPL-ACNC: 15 SEC — HIGH (ref 9.5–13)
PSA SERPL-MCNC: 1.58 NG/ML — SIGNIFICANT CHANGE UP (ref 0–4)
PSA SERPL-MCNC: 1.58 NG/ML — SIGNIFICANT CHANGE UP (ref 0–4)
RBC # BLD: 4 M/UL — LOW (ref 4.2–5.8)
RBC # BLD: 4 M/UL — LOW (ref 4.2–5.8)
RBC # FLD: 14.7 % — HIGH (ref 10.3–14.5)
RBC # FLD: 14.7 % — HIGH (ref 10.3–14.5)
RUBV IGG SER-ACNC: 14.4 INDEX — SIGNIFICANT CHANGE UP
RUBV IGG SER-ACNC: 14.4 INDEX — SIGNIFICANT CHANGE UP
RUBV IGG SER-IMP: POSITIVE — SIGNIFICANT CHANGE UP
RUBV IGG SER-IMP: POSITIVE — SIGNIFICANT CHANGE UP
SAO2 % BLDMV: 77.1 — SIGNIFICANT CHANGE UP (ref 60–90)
SAO2 % BLDMV: 77.1 — SIGNIFICANT CHANGE UP (ref 60–90)
SAO2 % BLDMV: 78.2 — SIGNIFICANT CHANGE UP (ref 60–90)
SAO2 % BLDMV: 78.2 — SIGNIFICANT CHANGE UP (ref 60–90)
SAO2 % BLDMV: 82.2 — SIGNIFICANT CHANGE UP (ref 60–90)
SAO2 % BLDMV: 82.2 — SIGNIFICANT CHANGE UP (ref 60–90)
SODIUM SERPL-SCNC: 132 MMOL/L — LOW (ref 135–145)
SODIUM SERPL-SCNC: 132 MMOL/L — LOW (ref 135–145)
SODIUM SERPL-SCNC: 133 MMOL/L — LOW (ref 135–145)
SODIUM SERPL-SCNC: 133 MMOL/L — LOW (ref 135–145)
SODIUM SERPL-SCNC: 134 MMOL/L — LOW (ref 135–145)
SODIUM SERPL-SCNC: 134 MMOL/L — LOW (ref 135–145)
T GONDII IGG SER QL: <3 IU/ML — SIGNIFICANT CHANGE UP
T GONDII IGG SER QL: <3 IU/ML — SIGNIFICANT CHANGE UP
T GONDII IGG SER QL: NEGATIVE — SIGNIFICANT CHANGE UP
T GONDII IGG SER QL: NEGATIVE — SIGNIFICANT CHANGE UP
T GONDII IGM SER QL: <3 AU/ML — SIGNIFICANT CHANGE UP
T GONDII IGM SER QL: <3 AU/ML — SIGNIFICANT CHANGE UP
T GONDII IGM SER QL: NEGATIVE — SIGNIFICANT CHANGE UP
T GONDII IGM SER QL: NEGATIVE — SIGNIFICANT CHANGE UP
T PALLIDUM AB TITR SER: NEGATIVE — SIGNIFICANT CHANGE UP
T PALLIDUM AB TITR SER: NEGATIVE — SIGNIFICANT CHANGE UP
VZV IGG FLD QL IA: 2531 INDEX — SIGNIFICANT CHANGE UP
VZV IGG FLD QL IA: 2531 INDEX — SIGNIFICANT CHANGE UP
VZV IGG FLD QL IA: POSITIVE — SIGNIFICANT CHANGE UP
VZV IGG FLD QL IA: POSITIVE — SIGNIFICANT CHANGE UP
WBC # BLD: 12.84 K/UL — HIGH (ref 3.8–10.5)
WBC # BLD: 12.84 K/UL — HIGH (ref 3.8–10.5)
WBC # FLD AUTO: 12.84 K/UL — HIGH (ref 3.8–10.5)
WBC # FLD AUTO: 12.84 K/UL — HIGH (ref 3.8–10.5)

## 2023-11-11 PROCEDURE — 99231 SBSQ HOSP IP/OBS SF/LOW 25: CPT

## 2023-11-11 PROCEDURE — 71250 CT THORAX DX C-: CPT | Mod: 26

## 2023-11-11 PROCEDURE — 93010 ELECTROCARDIOGRAM REPORT: CPT

## 2023-11-11 PROCEDURE — 93923 UPR/LXTR ART STDY 3+ LVLS: CPT | Mod: 26

## 2023-11-11 PROCEDURE — 93925 LOWER EXTREMITY STUDY: CPT | Mod: 26

## 2023-11-11 PROCEDURE — 71045 X-RAY EXAM CHEST 1 VIEW: CPT | Mod: 26

## 2023-11-11 PROCEDURE — 93308 TTE F-UP OR LMTD: CPT | Mod: 26

## 2023-11-11 PROCEDURE — 70450 CT HEAD/BRAIN W/O DYE: CPT | Mod: 26

## 2023-11-11 PROCEDURE — 74176 CT ABD & PELVIS W/O CONTRAST: CPT | Mod: 26

## 2023-11-11 PROCEDURE — 93321 DOPPLER ECHO F-UP/LMTD STD: CPT | Mod: 26

## 2023-11-11 PROCEDURE — 93880 EXTRACRANIAL BILAT STUDY: CPT | Mod: 26

## 2023-11-11 PROCEDURE — 99292 CRITICAL CARE ADDL 30 MIN: CPT

## 2023-11-11 PROCEDURE — 99291 CRITICAL CARE FIRST HOUR: CPT | Mod: 25

## 2023-11-11 PROCEDURE — 93970 EXTREMITY STUDY: CPT | Mod: 26

## 2023-11-11 PROCEDURE — 71045 X-RAY EXAM CHEST 1 VIEW: CPT | Mod: 26,77

## 2023-11-11 RX ORDER — ISOSORBIDE DINITRATE 5 MG/1
40 TABLET ORAL THREE TIMES A DAY
Refills: 0 | Status: DISCONTINUED | OUTPATIENT
Start: 2023-11-12 | End: 2023-11-22

## 2023-11-11 RX ORDER — POTASSIUM CHLORIDE 20 MEQ
20 PACKET (EA) ORAL ONCE
Refills: 0 | Status: COMPLETED | OUTPATIENT
Start: 2023-11-11 | End: 2023-11-11

## 2023-11-11 RX ORDER — POTASSIUM CHLORIDE 20 MEQ
20 PACKET (EA) ORAL ONCE
Refills: 0 | Status: DISCONTINUED | OUTPATIENT
Start: 2023-11-11 | End: 2023-11-11

## 2023-11-11 RX ORDER — ACETAMINOPHEN 500 MG
1000 TABLET ORAL ONCE
Refills: 0 | Status: COMPLETED | OUTPATIENT
Start: 2023-11-11 | End: 2023-11-11

## 2023-11-11 RX ORDER — ISOSORBIDE DINITRATE 5 MG/1
20 TABLET ORAL THREE TIMES A DAY
Refills: 0 | Status: DISCONTINUED | OUTPATIENT
Start: 2023-11-11 | End: 2023-11-11

## 2023-11-11 RX ORDER — INSULIN LISPRO 100/ML
8 VIAL (ML) SUBCUTANEOUS
Refills: 0 | Status: DISCONTINUED | OUTPATIENT
Start: 2023-11-11 | End: 2023-11-29

## 2023-11-11 RX ORDER — INSULIN GLARGINE 100 [IU]/ML
8 INJECTION, SOLUTION SUBCUTANEOUS AT BEDTIME
Refills: 0 | Status: DISCONTINUED | OUTPATIENT
Start: 2023-11-11 | End: 2023-11-18

## 2023-11-11 RX ORDER — ISOSORBIDE DINITRATE 5 MG/1
30 TABLET ORAL THREE TIMES A DAY
Refills: 0 | Status: DISCONTINUED | OUTPATIENT
Start: 2023-11-11 | End: 2023-11-11

## 2023-11-11 RX ORDER — ISOSORBIDE DINITRATE 5 MG/1
10 TABLET ORAL ONCE
Refills: 0 | Status: COMPLETED | OUTPATIENT
Start: 2023-11-11 | End: 2023-11-11

## 2023-11-11 RX ORDER — HYDRALAZINE HCL 50 MG
50 TABLET ORAL ONCE
Refills: 0 | Status: COMPLETED | OUTPATIENT
Start: 2023-11-11 | End: 2023-11-11

## 2023-11-11 RX ORDER — AMIODARONE HYDROCHLORIDE 400 MG/1
200 TABLET ORAL DAILY
Refills: 0 | Status: DISCONTINUED | OUTPATIENT
Start: 2023-11-18 | End: 2023-12-05

## 2023-11-11 RX ORDER — AMIODARONE HYDROCHLORIDE 400 MG/1
400 TABLET ORAL EVERY 8 HOURS
Refills: 0 | Status: COMPLETED | OUTPATIENT
Start: 2023-11-11 | End: 2023-11-17

## 2023-11-11 RX ORDER — HYDRALAZINE HCL 50 MG
100 TABLET ORAL THREE TIMES A DAY
Refills: 0 | Status: DISCONTINUED | OUTPATIENT
Start: 2023-11-12 | End: 2023-11-22

## 2023-11-11 RX ORDER — AMIODARONE HYDROCHLORIDE 400 MG/1
TABLET ORAL
Refills: 0 | Status: DISCONTINUED | OUTPATIENT
Start: 2023-11-11 | End: 2023-12-05

## 2023-11-11 RX ORDER — INSULIN LISPRO 100/ML
VIAL (ML) SUBCUTANEOUS
Refills: 0 | Status: DISCONTINUED | OUTPATIENT
Start: 2023-11-11 | End: 2023-12-24

## 2023-11-11 RX ADMIN — ISOSORBIDE DINITRATE 20 MILLIGRAM(S): 5 TABLET ORAL at 05:15

## 2023-11-11 RX ADMIN — TICAGRELOR 90 MILLIGRAM(S): 90 TABLET ORAL at 05:15

## 2023-11-11 RX ADMIN — Medication 50 MILLIGRAM(S): at 20:41

## 2023-11-11 RX ADMIN — SPIRONOLACTONE 25 MILLIGRAM(S): 25 TABLET, FILM COATED ORAL at 05:14

## 2023-11-11 RX ADMIN — Medication 3.75 MG/MIN: at 19:31

## 2023-11-11 RX ADMIN — AMIODARONE HYDROCHLORIDE 400 MILLIGRAM(S): 400 TABLET ORAL at 13:34

## 2023-11-11 RX ADMIN — HEPARIN SODIUM 15.5 UNIT(S)/HR: 5000 INJECTION INTRAVENOUS; SUBCUTANEOUS at 09:51

## 2023-11-11 RX ADMIN — Medication 400 MILLIGRAM(S): at 00:00

## 2023-11-11 RX ADMIN — Medication 50 MILLIGRAM(S): at 22:21

## 2023-11-11 RX ADMIN — Medication 100 MILLIGRAM(S): at 18:43

## 2023-11-11 RX ADMIN — ATORVASTATIN CALCIUM 80 MILLIGRAM(S): 80 TABLET, FILM COATED ORAL at 21:26

## 2023-11-11 RX ADMIN — ISOSORBIDE DINITRATE 20 MILLIGRAM(S): 5 TABLET ORAL at 13:34

## 2023-11-11 RX ADMIN — Medication 8 UNIT(S): at 17:30

## 2023-11-11 RX ADMIN — Medication 0.5 MILLIGRAM(S): at 20:17

## 2023-11-11 RX ADMIN — Medication 100 MILLIEQUIVALENT(S): at 02:17

## 2023-11-11 RX ADMIN — Medication 81 MILLIGRAM(S): at 13:34

## 2023-11-11 RX ADMIN — BUDESONIDE AND FORMOTEROL FUMARATE DIHYDRATE 2 PUFF(S): 160; 4.5 AEROSOL RESPIRATORY (INHALATION) at 20:55

## 2023-11-11 RX ADMIN — Medication 400 MILLIGRAM(S): at 13:35

## 2023-11-11 RX ADMIN — BUDESONIDE AND FORMOTEROL FUMARATE DIHYDRATE 2 PUFF(S): 160; 4.5 AEROSOL RESPIRATORY (INHALATION) at 08:53

## 2023-11-11 RX ADMIN — ISOSORBIDE DINITRATE 10 MILLIGRAM(S): 5 TABLET ORAL at 23:59

## 2023-11-11 RX ADMIN — Medication 400 MILLIGRAM(S): at 20:17

## 2023-11-11 RX ADMIN — Medication 1000 MILLIGRAM(S): at 00:15

## 2023-11-11 RX ADMIN — LIDOCAINE 1 PATCH: 4 CREAM TOPICAL at 19:28

## 2023-11-11 RX ADMIN — TIOTROPIUM BROMIDE 2 PUFF(S): 18 CAPSULE ORAL; RESPIRATORY (INHALATION) at 08:54

## 2023-11-11 RX ADMIN — AMIODARONE HYDROCHLORIDE 400 MILLIGRAM(S): 400 TABLET ORAL at 21:26

## 2023-11-11 RX ADMIN — Medication 0.5 MILLIGRAM(S): at 13:58

## 2023-11-11 RX ADMIN — Medication 1000 MILLIGRAM(S): at 21:00

## 2023-11-11 RX ADMIN — ISOSORBIDE DINITRATE 10 MILLIGRAM(S): 5 TABLET ORAL at 00:45

## 2023-11-11 RX ADMIN — Medication 50 MILLIGRAM(S): at 13:35

## 2023-11-11 RX ADMIN — TICAGRELOR 90 MILLIGRAM(S): 90 TABLET ORAL at 17:26

## 2023-11-11 RX ADMIN — HEPARIN SODIUM 15.5 UNIT(S)/HR: 5000 INJECTION INTRAVENOUS; SUBCUTANEOUS at 19:31

## 2023-11-11 RX ADMIN — Medication 3.75 MG/MIN: at 09:52

## 2023-11-11 RX ADMIN — CHLORHEXIDINE GLUCONATE 1 APPLICATION(S): 213 SOLUTION TOPICAL at 21:14

## 2023-11-11 RX ADMIN — Medication 400 MILLIGRAM(S): at 07:13

## 2023-11-11 RX ADMIN — Medication 0.5 MILLIGRAM(S): at 02:17

## 2023-11-11 RX ADMIN — PANTOPRAZOLE SODIUM 40 MILLIGRAM(S): 20 TABLET, DELAYED RELEASE ORAL at 13:35

## 2023-11-11 RX ADMIN — HEPARIN SODIUM 15.5 UNIT(S)/HR: 5000 INJECTION INTRAVENOUS; SUBCUTANEOUS at 15:00

## 2023-11-11 RX ADMIN — INSULIN GLARGINE 8 UNIT(S): 100 INJECTION, SOLUTION SUBCUTANEOUS at 21:28

## 2023-11-11 RX ADMIN — ISOSORBIDE DINITRATE 30 MILLIGRAM(S): 5 TABLET ORAL at 17:26

## 2023-11-11 RX ADMIN — Medication 50 MILLIGRAM(S): at 06:16

## 2023-11-11 RX ADMIN — Medication 1000 MILLIGRAM(S): at 14:05

## 2023-11-11 RX ADMIN — Medication 1000 MILLIGRAM(S): at 07:43

## 2023-11-11 RX ADMIN — LIDOCAINE 1 PATCH: 4 CREAM TOPICAL at 13:34

## 2023-11-11 NOTE — PROGRESS NOTE ADULT - SUBJECTIVE AND OBJECTIVE BOX
LD VALENCIA  58y Male  MRN:[id number]    Patient is a 58y old  Male who presents with a chief complaint of NSTEMI (01 Nov 2023 20:29)    HPI:  57yo M w/ hx HTN, CAD w/ 1 stent in 2009, ICH (2008) presenting with abn ekg. Patient presented to [facility] where he was found to have STEMI, recommended to get cath however patient did not want to get it there so it left and came here.  Patient initially had cough, congestion, fever, was placed on antibiotics on Sunday.  Started feeling nauseous and had a presyncopal event after which he presented to ED last night.  Had chest pain as well.  Chest pain is midsternal.  Not currently having chest pain.  Received 4 aspirin 30 min pta. (01 Nov 2023 15:11)      Patient seen and evaluated at bedside in CCU. interval events noted    Interval HPI: remain in ccu. IABP in place     PAST MEDICAL & SURGICAL HISTORY:  HTN (hypertension)      CAD (coronary artery disease)  2009; stent      Intracranial hemorrhage  2008      Respiratory arrest  december 1st      Myocardial infarction, unspecified MI type, unspecified artery      History of coronary artery stent placement          REVIEW OF SYSTEMS:  as per hpi     VITALS:   ICU Vital Signs Last 24 Hrs  T(C): 36.9 (11 Nov 2023 16:00), Max: 37.4 (11 Nov 2023 08:00)  T(F): 98.4 (11 Nov 2023 16:00), Max: 99.3 (11 Nov 2023 08:00)  HR: 89 (11 Nov 2023 18:00) (85 - 93)  BP: --  BP(mean): --  ABP: 107/67 (11 Nov 2023 18:00) (103/62 - 118/75)  ABP(mean): 88 (11 Nov 2023 18:00) (83 - 208)  RR: 21 (11 Nov 2023 18:00) (16 - 23)  SpO2: 94% (11 Nov 2023 18:00) (94% - 97%)    O2 Parameters below as of 11 Nov 2023 18:00  Patient On (Oxygen Delivery Method): room air            PHYSICAL EXAM:  GENERAL: NAD, comfortable   HEAD:  Atraumatic, Normocephalic  EYES: EOMI, PERRLA, conjunctiva and sclera clear  NECK: Supple, No JVD +central line  CHEST/LUNG: Clear to auscultation bilaterally; No wheeze  HEART: Regular rate and rhythm; No murmurs, rubs, or gallops  ABDOMEN: Soft, Nontender, Nondistended; Bowel sounds present  EXTREMITIES:  2+ Peripheral Pulses, No clubbing, cyanosis, or edema  NEUROLOGY: nonfocal  SKIN: No rashes or lesions  +iabp    Consultant(s) Notes Reviewed:  [x ] YES  [ ] NO  Care Discussed with Consultants/Other Providers [ x] YES  [ ] NO    MEDS:   MEDICATIONS  (STANDING):  acetaminophen   IVPB .. 1000 milliGRAM(s) IV Intermittent once  aMIOdarone    Tablet   Oral   aMIOdarone    Tablet 400 milliGRAM(s) Oral every 8 hours  aspirin  chewable 81 milliGRAM(s) Oral daily  atorvastatin 80 milliGRAM(s) Oral at bedtime  budesonide  80 MICROgram(s)/formoterol 4.5 MICROgram(s) Inhaler 2 Puff(s) Inhalation two times a day  chlorhexidine 2% Cloths 1 Application(s) Topical daily  dextrose 5% + sodium chloride 0.9%. 1000 milliLiter(s) (35 mL/Hr) IV Continuous <Continuous>  dextrose 50% Injectable 25 Gram(s) IV Push once  dextrose 50% Injectable 12.5 Gram(s) IV Push once  glucagon  Injectable 1 milliGRAM(s) IntraMuscular once  heparin  Infusion 1600 Unit(s)/Hr (15.5 mL/Hr) IV Continuous <Continuous>  hydrALAZINE 50 milliGRAM(s) Oral three times a day  insulin glargine Injectable (LANTUS) 8 Unit(s) SubCutaneous at bedtime  insulin lispro (ADMELOG) corrective regimen sliding scale   SubCutaneous three times a day before meals  insulin lispro Injectable (ADMELOG) 8 Unit(s) SubCutaneous three times a day before meals  isosorbide   dinitrate Tablet (ISORDIL) 30 milliGRAM(s) Oral three times a day  lidocaine   4% Patch 1 Patch Transdermal every 24 hours  lidocaine   Infusion 0.5 mG/Min (3.75 mL/Hr) IV Continuous <Continuous>  pantoprazole  Injectable 40 milliGRAM(s) IV Push daily  spironolactone 25 milliGRAM(s) Oral daily  ticagrelor 90 milliGRAM(s) Oral every 12 hours  tiotropium 2.5 MICROgram(s) Inhaler 2 Puff(s) Inhalation daily    MEDICATIONS  (PRN):  albuterol/ipratropium for Nebulization 3 milliLiter(s) Nebulizer every 6 hours PRN Shortness of Breath and/or Wheezing  guaiFENesin Oral Liquid (Sugar-Free) 100 milliGRAM(s) Oral every 6 hours PRN Cough  LORazepam     Tablet 0.5 milliGRAM(s) Oral every 6 hours PRN Anxiety      ALLERGIES:  penicillins (Unknown)      LABS:                                                                                                       11.5   12.84 )-----------( 266      ( 11 Nov 2023 00:51 )             34.2   11-11    133<L>  |  103  |  44<H>  ----------------------------<  150<H>  4.2   |  18<L>  |  2.90<H>    Ca    8.8      11 Nov 2023 18:09  Phos  3.7     11-11  Mg     2.4     11-11    TPro  6.9  /  Alb  3.1<L>  /  TBili  0.8  /  DBili  x   /  AST  56<H>  /  ALT  107<H>  /  AlkPhos  67  11-11       < from: Xray Chest 1 View- PORTABLE-Urgent (11.01.23 @ 07:42) >    IMPRESSION:  Clear lungs.    ---End of Report ---        < end of copied text >  < from: TTE W or WO Ultrasound Enhancing Agent (11.01.23 @ 10:23) >  _____________________________     CONCLUSIONS:      1. Left ventricular cavity is moderately dilated. Left ventricular wall thickness is normal. Left ventricular systolic function is severely decreased with an ejection fraction of 32 % by Chinchilla's method of disks. Regional wall motion abnormalities present.   2. Multiple segmental abnormalities exist. See findings.   3. There is moderate (grade 2) left ventricular diastolic dysfunction, with indeterminant filling pressure.   4. Normal right ventricular cavity size, wall thickness, and systolic function.   5. No significant valvular disease.   6. No pericardial effusion seen.   7. Compared to the transthoracic echocardiogram performed on 1/25/2017 the areas of akinesis are unchanged but there has been a decline in LV systolic function with new areas of hypokinesis.    __________________________________________________________________    < end of copied text >  < from: TTE Limited W or WO Ultrasound Enhancing Agent (11.02.23 @ 07:41) >  __________________________     CONCLUSIONS:      1. After obtaining consent, Definity ultrasound enhancing agent was given for enhanced left ventricular opacification and improved delineation of the left ventricular endocardial borders. Left ventricular systolic function is severely decreased with a calculated ejection fraction of 22 % by the Chinchilla's biplane method of disks. There is a left ventricular thrombus.   2. Findings were discussed with Litzy BOSS on 11/2/2023 at 8.49am.   3. There is a left ventricular thrombus.    _________________________________________________________________    < end of copied text >

## 2023-11-11 NOTE — PROGRESS NOTE ADULT - ASSESSMENT
59 yo M with HTN, CAD (s/p PCI 2008), HFrEF, CVA 2018, and T2DM presenting with chest pressure and unknown tachycardia that was shocked x1, C 11/1 found to have in-stent restenosis of pLAD and  of RCA with elevated RA and PA pressures and severely decreased EF 32%, admitted to CICU for management of cardiogenic shock requiring IABP 11/1 -11/7, with hospital course c/b vfib arrest.     PLAN    NEURO  - No neuro deficits known, baseline AOx3  - Propofol switched to Precedex, d/c post extubation    # Anxiety  - Hold Ativan  - No Seroquel or antipsychotics since vfib arrest and prolonged QTC (currently 528)  - Psych following    PULM  # Intubated post arrest  - Extubated to [facility] 11/10    # COVID +  - Maintain Airborne precautions    # Asthma (hx of respiratory failure in 2018- intubated for 20 minutes per chart review pt report)  - c/w albuterol, symbicort and spiriva  - on trelegy at home    CV  # Vfib arrest insetting of ischemia vs. shock  - PAC insertion, mv02 69.5%, CI 3.1,   - c/w Lido gtt at 2mg/hr, continue Amio @ 0.5  - Possibly decrease lido in afternoon if tele remains quiet  - pending cMRI  - Keep K > 4, Mag > 2.2     # Cardiogenic shock requiring IABP (11/1- 11/7)  - likely 2/2 NSTEMI and ADHF  - 11/1 LHC: pLAD 100 % in-stent restenosis & mRCA, 100 %. PCWP 30  - 11/1 TTE: LV dilated. EF 32 %. Regional WMAs present, mod (grade 2) LV diastolic dysfunction  - 11/2 TTE: EF 22% and + LV thrombus   - Switching lasix to bumex due to poor UOP response and elevated CVPs  - IABP removed 11/7, reinserted 11/9  - Restarting hydralazine 25 TID and ISD 10 TID  - continue romel 25 daily  - continue to monitor perfusion indices   - continue Strict I&O, goal net negative  - Daily weights    # Stent re-occlusion of pLAD and 100%  of RCA  - EKG on admission w/ LBBB  - DAPT: c/w ASA, brillinta   - c/w lipitor 80  - CT sx not recommending CABG, undergoing AT eval  - pending cardiac MRI for viability    # LV thrombus  - c/w heparin gtt    # HTN  - afterload reduction as above    /RENAL  #non-oliguric ARIC  - sCr uptrending likely i/s/o post arrest, Baseline Cr: 1-1.22  - diuresis as above  - Trend BMP, lytes daily, replace as needed  - continue Strict I/Os, avoid nephrotoxins    GI  #Transaminitis  - Likely worsening i/s/o cardiogenic shock  - continue to trend daily    - NPO while intubated  - NGT for meds    #GERD  - c/w home protonix    # Bowel regimen  - miralax and senna d/c'd iso loose stools    ENDO  #Type 2 DM  - A1c 8.3, sugars uncontrolled on admission likely stress induced s/p cardiac arrest  - c/w insulin gtt    HEME  - H/H and platelets stable  - continue to trend daily    # VTE prophylaxis   - c/w heparin gtt    ID  - leukocytosis likely s/t cardiac arrest  - afebrile, continue to monitor off abx  - concern for spiration event prior to intubation on admission - s/p vanco and cefepime 11/2)    #COVID  - Maintain airborne precautions    GO  - Full code    ======================= DISPOSITION  =====================  [X] Critical   [ ] Guarded    [ ] Stable    [X] Maintain in CICU  [ ] Downgrade to Telemetry  [ ] Discharge Home     59 yo M with HTN, CAD (s/p PCI 2008), HFrEF, CVA 2018, and T2DM presenting with chest pressure and unknown tachycardia that was shocked x1, C 11/1 found to have in-stent restenosis of pLAD and  of RCA with elevated RA and PA pressures and severely decreased EF 32%, admitted to CICU for management of cardiogenic shock requiring IABP 11/1 -11/7, with hospital course c/b vfib arrest.     PLAN    NEURO  - No neuro deficits known, baseline AOx3  - Propofol switched to Precedex, d/c post extubation    # Anxiety  - Ativan 0.5 mg PO Q 6hrs PRN   - No Seroquel or antipsychotics since vfib arrest and prolonged QTC (currently 528)  - Psych following    PULM  # Intubated post arrest  - Extubated to [facility] 11/10  - currently on NC 2L w/ O2 SAT >92%    # COVID +  - off isolation: cleared by ID     # Asthma (hx of respiratory failure in 2018- intubated for 20 minutes per chart review pt report)  - c/w albuterol, symbicort and spiriva  - on trelegy at home    CV  # Vfib arrest insetting of ischemia vs. shock  - Titrate down lido@0.5 No further episode on tele   - Resume PO Amio load dose   - Unclear role for cMRI at this point   - Keep K > 4, Mag > 2.2     # Cardiogenic shock requiring IABP (11/1- 11/7, 11/9-)  - likely 2/2 NSTEMI and ADHF  - 11/1 LHC: pLAD 100 % in-stent restenosis & mRCA, 100 %. PCWP 30  - 11/1 TTE: LV dilated. EF 32 %. Regional WMAs present, mod (grade 2) LV diastolic dysfunction  - 11/2 TTE: EF 22% and + LV thrombus   - Switching lasix to bumex due to poor UOP response and elevated CVPs  - IABP removed 11/7, reinserted 11/9  - D/C Milrinone gtt   - currently on hydralazine 25 TID and ISD 10 TID: titrate up PO ALR  - continue romel 25 daily  - continue to monitor perfusion indices   - continue Strict I&O, goal net negative  - Daily weights    # Stent re-occlusion of pLAD and 100%  of RCA  - EKG on admission w/ LBBB  - DAPT: c/w ASA, brillinta   - c/w lipitor 80  - CT sx not recommending CABG, undergoing AT eval    # LV thrombus  - c/w heparin gtt    # HTN  - afterload reduction as above    /RENAL  #non-oliguric ARIC  - sCr uptrending likely i/s/o post arrest, Baseline Cr: 1-1.22  - diuresis as above  - Trend BMP, lytes daily, replace as needed  - continue Strict I/Os, avoid nephrotoxins    GI  #Transaminitis  - Likely worsening i/s/o cardiogenic shock  - continue to trend daily    - NPO while intubated  - NGT for meds    #GERD  - c/w home protonix    # Bowel regimen  - miralax and senna d/c'd iso loose stools    ENDO  #Type 2 DM  - A1c 8.3, sugars uncontrolled on admission likely stress induced s/p cardiac arrest  - c/w insulin gtt    HEME  - H/H and platelets stable  - continue to trend daily    # VTE prophylaxis   - c/w heparin gtt    ID  - leukocytosis likely s/t cardiac arrest  - afebrile, continue to monitor off abx  - concern for spiration event prior to intubation on admission - s/p vanco and cefepime 11/2)    #COVID  - Maintain airborne precautions    GO  - Full code    ======================= DISPOSITION  =====================  [X] Critical   [ ] Guarded    [ ] Stable    [X] Maintain in CICU  [ ] Downgrade to Telemetry  [ ] Discharge Home     59 yo M with HTN, CAD (s/p PCI 2008), HFrEF, CVA 2018, and T2DM presenting with chest pressure and unknown tachycardia that was shocked x1, [facility] 11/1 found to have in-stent restenosis of pLAD and  of RCA with elevated RA and PA pressures and severely decreased EF 32%, admitted to CICU for management of cardiogenic shock requiring IABP 11/1 -11/7, with hospital course c/b vfib arrest.     PLAN    NEURO  - No neuro deficits known, baseline AOx3  - Propofol switched to Precedex, d/c post extubation    # Anxiety  - Ativan 0.5 mg PO Q 6hrs PRN   - No Seroquel or antipsychotics since vfib arrest and prolonged QTC (currently 528)  - Psych following    PULM  # Intubated post arrest  - Extubated to [facility] 11/10  - currently on NC 2L w/ O2 SAT >92%    # COVID +  - off isolation: cleared by ID     # Asthma (hx of respiratory failure in 2018- intubated for 20 minutes per chart review pt report)  - c/w albuterol, symbicort and spiriva  - on trelegy at home    CV  # Vfib arrest insetting of ischemia vs. shock  - Titrate down lido@0.5 No further episode on tele   - Resume PO Amio load dose (s/p IV amio ~1950mg lv gtt)  - Unclear role for cMRI at this point   - Keep K > 4, Mag > 2.2     # Cardiogenic shock requiring IABP (11/1- 11/7, 11/9-)  - likely 2/2 NSTEMI and ADHF  - 11/1 [facility]: pLAD 100 % in-stent restenosis & mRCA, 100 %. PCWP 30  - 11/1 TTE: LV dilated. EF 32 %. Regional WMAs present, mod (grade 2) LV diastolic dysfunction  - 11/2 TTE: EF 22% and + LV thrombus   - Switching lasix to bumex due to poor UOP response and elevated CVPs  - IABP removed 11/7, reinserted 11/9  - D/C Milrinone gtt   - currently on hydralazine 25 TID and ISD 10 TID: titrate up PO ALR  - continue romel 25 daily  - continue to monitor perfusion indices   - continue Strict I&O, goal net negative  - Daily weights    # Stent re-occlusion of pLAD and 100%  of RCA  - EKG on admission w/ LBBB  - DAPT: c/w ASA, brillinta   - c/w lipitor 80  - CT sx not recommending CABG, undergoing AT eval    # LV thrombus  - c/w heparin gtt    # HTN  - afterload reduction as above    /RENAL  #non-oliguric ARIC  - sCr uptrending likely i/s/o post arrest, Baseline Cr: 1-1.22  - diuresis as above  - Trend BMP, lytes daily, replace as needed  - continue Strict I/Os, avoid nephrotoxins    GI  #Transaminitis  - Likely worsening i/s/o cardiogenic shock  - continue to trend daily    - NPO while intubated  - NGT for meds    #GERD  - c/w home protonix    # Bowel regimen  - miralax and senna d/c'd iso loose stools    ENDO  #Type 2 DM  - A1c 8.3, sugars uncontrolled on admission likely stress induced s/p cardiac arrest  - c/w insulin gtt    HEME  - H/H and platelets stable  - continue to trend daily    # VTE prophylaxis   - c/w heparin gtt    ID  - leukocytosis likely s/t cardiac arrest  - afebrile, continue to monitor off abx  - concern for spiration event prior to intubation on admission - s/p vanco and cefepime 11/2)    #COVID  - Maintain airborne precautions    [facility]  - Full code    ======================= DISPOSITION  =====================  [X] Critical   [ ] Guarded    [ ] Stable    [X] Maintain in CICU  [ ] Downgrade to Telemetry  [ ] Discharge Home     59 yo M with HTN, CAD (s/p PCI 2008), HFrEF, CVA 2018, and T2DM presenting with chest pressure and unknown tachycardia that was shocked x1, [facility] 11/1 found to have in-stent restenosis of pLAD and  of RCA with elevated RA and PA pressures and severely decreased EF 32%, admitted to CICU for management of cardiogenic shock requiring IABP 11/1 -11/7, with hospital course c/b vfib arrest.     PLAN    NEURO  - No neuro deficits known, baseline AOx3  - Propofol switched to Precedex, d/c post extubation    # Anxiety  - Ativan 0.5 mg PO Q 6hrs PRN   - No Seroquel or antipsychotics since vfib arrest and prolonged QTC (currently 528)  - Psych following    PULM  # Intubated post arrest  - Extubated to [facility] 11/10  - currently on NC 2L w/ O2 SAT >92%  - Oxygen supplement to maintain O2 SAT>90%    # COVID +  - off isolation: cleared by ID today(11/11)     # Asthma (hx of respiratory failure in 2018- intubated for 20 minutes per chart review pt report)  - c/w albuterol, symbicort and spiriva  - on trelegy at home    CARDIOVASCULAR   # Vfib arrest insetting of ischemia vs. shock  - Titrate down lido@0.5 No further episode on tele   - Resume PO Amio load dose (s/p IV amio ~1950mg lv gtt)  - Unclear role for cMRI at this point   - Keep K > 4, Mag > 2.2     # Cardiogenic shock requiring IABP (11/1- 11/7, 11/9-)  - likely 2/2 NSTEMI and ADHF  - 11/1 [facility]: pLAD 100 % in-stent restenosis & mRCA, 100 %. PCWP 30  - 11/1 TTE: LV dilated. EF 32 %. Regional WMAs present, mod (grade 2) LV diastolic dysfunction  - 11/2 TTE: EF 22% and + LV thrombus   - Switching lasix to bumex due to poor UOP response and elevated CVPs  - IABP removed 11/7, reinserted 11/9  - D/C Milrinone gtt 7:30 am today   - currently on hydralazine 25 TID and ISD 10 TID: titrate up PO ALR  - continue romel 25 daily  - continue to monitor perfusion indices   - continue Strict I&O, goal net negative  - Daily weights    # Stent re-occlusion of pLAD and 100%  of RCA  - EKG on admission w/ LBBB  - DAPT: c/w ASA, brillinta   - Cont. lipitor 80  - CT sx not recommending CABG, undergoing AT eval    # LV thrombus  - c/w heparin gtt    # HTN  - afterload reduction as above    /RENAL  #non-oliguric ARIC  - sCr uptrending likely i/s/o post arrest, Baseline Cr: 1-1.22  - diuresis as above  - Trend BMP, lytes daily, replace as needed  - continue Strict I/Os, avoid nephrotoxins    GI  #Transaminitis  - Likely worsening i/s/o cardiogenic shock  - continue to trend daily    - NPO while intubated  - NGT for meds    #GERD  - c/w home protonix    # Bowel regimen  - miralax and senna d/c'd iso loose stools    ENDO  #Type 2 DM  - A1c 8.3, sugars uncontrolled on admission likely stress induced s/p cardiac arrest  - c/w insulin gtt    HEME  - H/H and platelets stable  - continue to trend daily    # VTE prophylaxis   - c/w heparin gtt    ID  - leukocytosis likely s/t cardiac arrest  - afebrile, continue to monitor off abx  - concern for spiration event prior to intubation on admission - s/p vanco and cefepime 11/2)    #COVID  - Maintain airborne precautions    GO  - Full code    ======================= DISPOSITION  =====================  [X] Critical   [ ] Guarded    [ ] Stable    [X] Maintain in CICU  [ ] Downgrade to Telemetry  [ ] Discharge Home

## 2023-11-11 NOTE — PROGRESS NOTE ADULT - NS ATTEND AMEND GEN_ALL_CORE FT
58M with HTN, CAD (s/p PCI 2008), ICH 2008, HFrEF (EF severely reduced 2018) presenting with chest pressure on 11/1. Prior to cath was intubated 2/2 AHRF, s/p LHC showed pLAD 70 % stenosis in the ostial portion of the segment and 100 % in-stent restenosis, 100 % stenosis mRCA and RHC revealing elevated filling pressures and marginal perfusion indices for which IABP was inserted. He was admitted to CICU, extubated to nasal canula on 11/3, IABP weaned and discontinued 11/7. While in CICU he was diuresed and weaned off inotropes, initiated on PO GDMT, was downgraded to the floor on 11/8. Of note, he was pending cardiac viability study; however, had been deferred for CABG by CTS. On 11/8 he had witnessed seizure activity by his family and lost pulse. Code blue called for PEA arrest, telemetry reveals VT/VF. ROSC achieved after shock delivered x3, Lidocaine x1, Amio x1 and Mg x1 given. EP consulted for VT/VF arrest. PO Amiodarone started. Wean Lido as tolerated. No plan for viability study at this time. AT eval launched 11/10. Will need secondary prevention ICD prior to discharge. Close telemetry monitoring.

## 2023-11-11 NOTE — PROGRESS NOTE ADULT - CRITICAL CARE ATTENDING COMMENT
History of CAD s/p PCI  Admitted with cardiogenic shock and respiratory failure in the setting of stent restenosis  Transferred out and had VT/VF cardiac arrest  Intubated during arrest, ROSC achieved, readmitted to CICU, Armando placed  A+Ox3, anxious - restart Ativan 0.5 q6h  Cardiogenic shock on IABP and low-dose Milrinone, SvO2 is elevated - will stop Milrinone  Maintain IABP and titrate up Bidil for afterload reduction  VT/VF arrest, on Amiodarone and Lidocaine infusions - wean Lidocaine  Continue DAPT with ASA/Ticagrelor for coronary disease for prior PCI  Acute hypoxic respiratory failure requiring mechanical ventilation, on minimal settings - wean to extubate  Soft diet  Non-oliguric ARIC, likely ATN from arrest, filling pressures are low - hold diuretics  H/H low but acceptable on Heparin drip for LV thrombus  Afebrile for 24 hours, cultures no growth, no antibiotics - f/u cultures  Sugars controlled on Insulin drip - transition to Librado Farnsworth/rangel Roberts 11/9 History of CAD s/p PCI  Admitted with cardiogenic shock and respiratory failure in the setting of stent restenosis  Transferred out and had VT/VF cardiac arrest  Intubated during arrest, ROSC achieved, readmitted to CICU, Armando placed  A+Ox3, anxious - restart Ativan 0.5 q6h  Cardiogenic shock on IABP and low-dose Milrinone, SvO2 is elevated - will stop Milrinone  Maintain IABP and titrate up Bidil for afterload reduction  VT/VF arrest, on Amiodarone and Lidocaine infusions - wean Lidocaine  Continue DAPT with ASA/Ticagrelor for coronary disease for prior PCI  Acute hypoxic respiratory failure requiring high flow nasal cannula - wean to nasal cannula  Soft diet  Non-oliguric ARIC, likely ATN from arrest, filling pressures are low - hold diuretics  H/H low but acceptable on Heparin drip for LV thrombus  Afebrile for 24 hours, cultures no growth, no antibiotics - f/u cultures  Sugars controlled on Insulin drip - transition to Lantus  RIJ Cordis/rangel Roberts 11/9 History of CAD s/p PCI  Admitted with cardiogenic shock and respiratory failure in the setting of stent restenosis  Transferred out and had VT/VF cardiac arrest  Intubated during arrest, ROSC achieved, readmitted to CICU, Armando placed  A+Ox3, anxious - restart Ativan 0.5 q6h  Cardiogenic shock on IABP and low-dose Milrinone, SvO2 is elevated - will stop Milrinone  Maintain IABP and titrate up Bidil for afterload reduction  VT/VF arrest, on Amiodarone and Lidocaine infusions - wean Lidocaine  Continue DAPT with ASA/Ticagrelor for coronary disease for prior PCI  Acute hypoxic respiratory failure requiring high flow nasal cannula - wean to nasal cannula      Soft diet  Non-oliguric ARIC, likely ATN from arrest, filling pressures are low - hold diuretics  H/H low but acceptable on Heparin drip for LV thrombus  Afebrile for 24 hours, cultures no growth, no antibiotics - f/u cultures  Sugars controlled on Insulin drip - transition to Lantus  RIJ Cordis/rangel Roberts 11/9

## 2023-11-11 NOTE — PROGRESS NOTE ADULT - SUBJECTIVE AND OBJECTIVE BOX
LD VALENCIA  MRN-[id number]  Patient is a 58y old  Male who presents with a chief complaint of cardiogenic shock (11 Nov 2023 06:45)    HPI:  pt seen and approx 1:30 pm  in [facility]    59yo M w/ hx HTN, CAD w/ 1 stent in 2009, ICH (2008) presenting with abn ekg. Patient presented to [facility] where he was found to have STEMI, recommended to get cath however patient did not want to get it there so it left and came here.  Patient initially had cough, congestion, fever, was placed on antibiotics on Sunday.  Started feeling nauseous and had a presyncopal event after which he presented to ED last night.  Had chest pain as well.  Chest pain is midsternal.  Not currently having chest pain.  Received 4 aspirin 30 min pta. (01 Nov 2023 15:11)    24 hours:  on milrinone@0.125, Lido weaned to 0.5, Amio0.5 gtt now PO, Hep, transitioned off insulin gtt    REVIEW OF SYSTEMS:    CONSTITUTIONAL: No weakness, fevers or chills  EYES/ENT: No visual changes;  No vertigo or throat pain   NECK: No pain or stiffness  RESPIRATORY: No cough, wheezing, hemoptysis; No shortness of breath  CARDIOVASCULAR: No chest pain or palpitations  GASTROINTESTINAL: No abdominal or epigastric pain. No nausea, vomiting, or hematemesis; No diarrhea or constipation. No melena or hematochezia.  GENITOURINARY: No dysuria, frequency or hematuria  NEUROLOGICAL: No numbness or weakness  SKIN: No itching, rashes      Physical Exam:  Vital Signs Last 24 Hrs  T(C): 36.9 (11 Nov 2023 16:00), Max: 37.4 (11 Nov 2023 08:00)  T(F): 98.4 (11 Nov 2023 16:00), Max: 99.3 (11 Nov 2023 08:00)  HR: 89 (11 Nov 2023 18:00) (85 - 93)  BP: --  BP(mean): --  RR: 21 (11 Nov 2023 18:00) (16 - 23)  SpO2: 94% (11 Nov 2023 18:00) (94% - 97%)    Parameters below as of 11 Nov 2023 18:00  Patient On (Oxygen Delivery Method): room air    ============================I/O===========================   I&O's Detail    10 Nov 2023 07:01  -  11 Nov 2023 07:00  --------------------------------------------------------  IN:    Amiodarone: 400.8 mL    Bumetanide: 5 mL    Bumetanide: 10 mL    Dexmedetomidine: 19.8 mL    dextrose 5% + sodium chloride 0.9%: 455 mL    Heparin: 357 mL    Insulin: 78 mL    IV PiggyBack: 550 mL    Lidocaine: 30 mL    Lidocaine: 105 mL    Lidocaine: 112.5 mL    Milrinone: 88.8 mL    Oral Fluid: 710 mL  Total IN: 2921.9 mL    OUT:    Indwelling Catheter - Urethral (mL): 3600 mL    Propofol: 0 mL    Vasopressin: 0 mL  Total OUT: 3600 mL    Total NET: -678.1 mL      11 Nov 2023 07:01  -  11 Nov 2023 19:32  --------------------------------------------------------  IN:    Amiodarone: 116.9 mL    Heparin: 170.5 mL    Insulin: 14 mL    IV PiggyBack: 100 mL    Lidocaine: 22.5 mL    Lidocaine: 30.4 mL    Oral Fluid: 420 mL  Total IN: 874.3 mL    OUT:    dextrose 5% + sodium chloride 0.9%: 0 mL    Indwelling Catheter - Urethral (mL): 1175 mL  Total OUT: 1175 mL    Total NET: -300.7 mL        ============================ LABS =========================                        11.5   12.84 )-----------( 266      ( 11 Nov 2023 00:51 )             34.2     11-11    133<L>  |  103  |  44<H>  ----------------------------<  150<H>  4.2   |  18<L>  |  2.90<H>    Ca    8.8      11 Nov 2023 18:09  Phos  3.7     11-11  Mg     2.4     11-11    TPro  6.9  /  Alb  3.1<L>  /  TBili  0.8  /  DBili  x   /  AST  56<H>  /  ALT  107<H>  /  AlkPhos  67  11-11    LIVER FUNCTIONS - ( 11 Nov 2023 18:09 )  Alb: 3.1 g/dL / Pro: 6.9 g/dL / ALK PHOS: 67 U/L / ALT: 107 U/L / AST: 56 U/L / GGT: x           PT/INR - ( 11 Nov 2023 00:51 )   PT: 15.0 sec;   INR: 1.44 ratio         PTT - ( 11 Nov 2023 14:19 )  PTT:61.2 sec  ABG - ( 11 Nov 2023 17:50 )  pH, Arterial: 7.42  pH, Blood: x     /  pCO2: 30    /  pO2: 132   / HCO3: 20    / Base Excess: -4.0  /  SaO2: 99.2              Urinalysis Basic - ( 11 Nov 2023 18:09 )    Color: x / Appearance: x / SG: x / pH: x  Gluc: 150 mg/dL / Ketone: x  / Bili: x / Urobili: x   Blood: x / Protein: x / Nitrite: x   Leuk Esterase: x / RBC: x / WBC x   Sq Epi: x / Non Sq Epi: x / Bacteria: x      ======================Micro/Rad/Cardio=================  Culture: Reviewed   CXR: Reviewed  Echo:Reviewed  ======================================================  PAST MEDICAL & SURGICAL HISTORY:  HTN (hypertension)      CAD (coronary artery disease)  2009; stent      Intracranial hemorrhage  2008      Respiratory arrest  december 1st      Myocardial infarction, unspecified MI type, unspecified artery      History of coronary artery stent placement        ====================ASSESSMENT ==============  57 yo M with HTN, CAD (s/p PCI 2008), HFrEF, CVA 2018, and T2DM presenting with chest pressure and unknown tachycardia that was shocked x1, C 11/1 found to have in-stent restenosis of pLAD and  of RCA with elevated RA and PA pressures and severely decreased EF 32%, admitted to CICU for management of cardiogenic shock requiring IABP 11/1 -11/7, with hospital course c/b vfib arrest.     PLAN    NEURO  - No neuro deficits known, baseline AOx3  - Propofol switched to Precedex, d/c post extubation    # Anxiety  - Ativan 0.5 mg PO Q 6hrs PRN   - No Seroquel or antipsychotics since vfib arrest and prolonged QTC (currently 528)  - Psych following    PULM  # Intubated post arrest  - Extubated to HFNC 11/10  - currently on NC 2L w/ O2 SAT >92%  - Oxygen supplement to maintain O2 SAT>90% wean as jeremiah    # COVID +  - off isolation: cleared by ID today(11/11)     # Asthma (hx of respiratory failure in 2018- intubated for 20 minutes per chart review pt report)  - c/w albuterol, symbicort and spiriva  - on trelegy at home    CARDIOVASCULAR   # Vfib arrest insetting of ischemia vs. shock  - Titrated down lido@0.5 No further episode on tele   - Resume PO Amio load dose (s/p IV amio ~1950mg lv gtt)  - Unclear role for cMRI at this point   - Keep K > 4, Mag > 2.2     # Cardiogenic shock requiring IABP (11/1- 11/7, 11/9-)  - likely 2/2 NSTEMI and ADHF  - 11/1 LHC: pLAD 100 % in-stent restenosis & mRCA, 100 %. PCWP 30  - 11/1 TTE: LV dilated. EF 32 %. Regional WMAs present, mod (grade 2) LV diastolic dysfunction  - 11/2 TTE: EF 22% and + LV thrombus   - Switching lasix to bumex due to poor UOP response and elevated CVPs  - IABP removed 11/7, reinserted 11/9  - D/C'd Milrinone gtt 7:30 am today   - currently on hydralazine 50 TID and ISD 30 TID: titrate up PO ALR  - continue romel 25 daily  - continue to monitor perfusion indices   - continue Strict I&O, goal net negative  - Daily weights    # Stent re-occlusion of pLAD and 100%  of RCA  - EKG on admission w/ LBBB  - DAPT: c/w ASA, brillinta   - Cont. lipitor 80  - CT sx not recommending CABG, undergoing AT eval    # LV thrombus  - c/w heparin gtt    # HTN  - afterload reduction as above    /RENAL  #non-oliguric ARIC  - sCr uptrending likely i/s/o post arrest, Baseline Cr: 1-1.22  - diuresis as above  - Trend BMP, lytes daily, replace as needed  - continue Strict I/Os, avoid nephrotoxins    GI  #Transaminitis  - Likely worsening i/s/o cardiogenic shock  - continue to trend daily    - NPO while intubated  - NGT for meds    #GERD  - c/w home protonix    # Bowel regimen  - miralax and senna d/c'd iso loose stools    ENDO  #Type 2 DM  - A1c 8.3, sugars uncontrolled on admission likely stress induced s/p cardiac arrest  - insulin gtt off 11/11 now NPH 8, ISS    HEME  - H/H and platelets stable  - continue to trend daily    # VTE prophylaxis   - c/w heparin gtt    ID  - leukocytosis likely s/t cardiac arrest  - afebrile, continue to monitor off abx  - concern for spiration event prior to intubation on admission - s/p vanco and cefepime 11/2)    #COVID  - Off airborne precautions 11/11    [facility]  - Full code      Patient requires continuous monitoring with bedside rhythm monitoring, arterial line, pulse oximetry, ventilator monitoring and intermittent blood gas analysis.  Care plan discussed with ICU care team.  Patient remained critical; required more than usual post op care; I have spent 35 minutes providing non-routine post op care, revaluated multiple times during the day.

## 2023-11-11 NOTE — PROGRESS NOTE ADULT - ASSESSMENT
58M with HTN, CAD (s/p PCI 2008), ICH 2008, HFrEF (EF severely reduced 2018) presenting with chest pressure on 11/1. Prior to cath was intubated 2/2 AHRF, s/p LHC showed pLAD 70 % stenosis in the ostial portion of the segment and 100 % in-stent restenosis, 100 % stenosis mRCA and RHC revealing elevated filling pressures and marginal perfusion indices for which IABP was inserted. He was admitted to CICU, extubated to nasal canula on 11/3, IABP weaned and discontinued 11/7. While in CICU he was diuresed and weaned off inotropes, intiated on PO GDMT, was downgraded to the floor on 11/8. Of note, he was pending cardiac viability study; however, had been deferred for CABG by CTS. On 11/8 he had witnessed seizure activity by his family and lost pulse. Code blue called for PEA arrest, telemetry reveals VT/VF. ROSC achieved after shock delivered x3, Lidocaine x1, Amio x1 and Mg x1 given. EP consulted for VT/VF arrest.     1. HFrEF, 32% w/ WMA on milrinone with IABP   2. CAD   3. Cardiogenic shock   4. VT/VF arrest    - PO Amiodarone started  - Wean Lido as tolerated  - No plan for viability study at this time  - AT eval launched 11/10  - Will need secondary prevention ICD prior to discharge  - Close telemetry monitoring  - Keep Mg > 2 and K > 4

## 2023-11-11 NOTE — PROGRESS NOTE ADULT - SUBJECTIVE AND OBJECTIVE BOX
24H hour events: No acute events    MEDICATIONS:  aMIOdarone    Tablet   Oral   aspirin  chewable 81 milliGRAM(s) Oral daily  heparin  Infusion 1600 Unit(s)/Hr IV Continuous <Continuous>  hydrALAZINE 50 milliGRAM(s) Oral three times a day  isosorbide   dinitrate Tablet (ISORDIL) 20 milliGRAM(s) Oral three times a day  lidocaine   Infusion 0.5 mG/Min IV Continuous <Continuous>  spironolactone 25 milliGRAM(s) Oral daily  ticagrelor 90 milliGRAM(s) Oral every 12 hours  albuterol/ipratropium for Nebulization 3 milliLiter(s) Nebulizer every 6 hours PRN  budesonide  80 MICROgram(s)/formoterol 4.5 MICROgram(s) Inhaler 2 Puff(s) Inhalation two times a day  tiotropium 2.5 MICROgram(s) Inhaler 2 Puff(s) Inhalation daily  LORazepam     Tablet 0.5 milliGRAM(s) Oral every 6 hours PRN  pantoprazole  Injectable 40 milliGRAM(s) IV Push daily  atorvastatin 80 milliGRAM(s) Oral at bedtime  dextrose 50% Injectable 25 Gram(s) IV Push once  dextrose 50% Injectable 12.5 Gram(s) IV Push once  glucagon  Injectable 1 milliGRAM(s) IntraMuscular once  insulin regular Infusion 3 Unit(s)/Hr IV Continuous <Continuous>  chlorhexidine 2% Cloths 1 Application(s) Topical daily  dextrose 5% + sodium chloride 0.9%. 1000 milliLiter(s) IV Continuous <Continuous>  lidocaine   4% Patch 1 Patch Transdermal every 24 hours      REVIEW OF SYSTEMS:  Complete 12 point ROS negative.    PHYSICAL EXAM:  T(C): 37.4 (11-11-23 @ 08:00), Max: 37.8 (11-10-23 @ 12:00)  HR: 88 (11-11-23 @ 09:00) (86 - 99)  BP: --  RR: 16 (11-11-23 @ 09:00) (16 - 23)  SpO2: 95% (11-11-23 @ 09:00) (93% - 97%)  Wt(kg): --  I&O's Summary    10 Nov 2023 07:01  -  11 Nov 2023 07:00  --------------------------------------------------------  IN: 2921.9 mL / OUT: 3600 mL / NET: -678.1 mL    11 Nov 2023 07:01  -  11 Nov 2023 10:35  --------------------------------------------------------  IN: 41.7 mL / OUT: 30 mL / NET: 11.7 m      LABS:	 	    CBC Full  -  ( 11 Nov 2023 00:51 )  WBC Count : 12.84 K/uL  Hemoglobin : 11.5 g/dL  Hematocrit : 34.2 %  Platelet Count - Automated : 266 K/uL  Mean Cell Volume : 85.5 fl  Mean Cell Hemoglobin : 28.8 pg  Mean Cell Hemoglobin Concentration : 33.6 gm/dL  Auto Neutrophil # : 9.64 K/uL  Auto Lymphocyte # : 1.42 K/uL  Auto Monocyte # : 1.55 K/uL  Auto Eosinophil # : 0.13 K/uL  Auto Basophil # : 0.04 K/uL  Auto Neutrophil % : 75.0 %  Auto Lymphocyte % : 11.1 %  Auto Monocyte % : 12.1 %  Auto Eosinophil % : 1.0 %  Auto Basophil % : 0.3 %    11-11    134<L>  |  100  |  53<H>  ----------------------------<  113<H>  3.8   |  19<L>  |  3.92<H>  11-10    135  |  99  |  53<H>  ----------------------------<  122<H>  3.6   |  18<L>  |  4.07<H>    Ca    8.8      11 Nov 2023 00:51  Ca    9.1      10 Nov 2023 17:09  Phos  4.7     11-11  Phos  5.4     11-10  Mg     2.5     11-11  Mg     2.5     11-10    TPro  7.0  /  Alb  3.3  /  TBili  0.9  /  DBili  x   /  AST  78<H>  /  ALT  132<H>  /  AlkPhos  70  11-11  TPro  6.7  /  Alb  x   /  TBili  x   /  DBili  x   /  AST  x   /  ALT  x   /  AlkPhos  x   11-10      TELEMETRY: SR 80-100s, 3 bts VT      < from: TTE Limited W or WO Ultrasound Enhancing Agent (11.02.23 @ 07:41) >  RANSTHORACIC ECHOCARDIOGRAM REPORT  ________________________________________________________________________________                                      _______       Pt. Name:       LD VALENCIA Study Date:    11/2/2023  MRN:            [id number]      YOB: 1965  Accession #:    [id number]       Age:           58 years  Account#:       [id number]    Gender:        M  Heart Rate:                     Height:        69.00 in (175.26 cm)  Rhythm:                         Weight:        197.00 lb (89.36 kg)  Blood Pressure: 129/100 mmHg    BSA/BMI:       2.05 m² / 29.09 kg/m²  ________________________________________________________________________________________  Referring Physician:    [id number] Rudy Mohan  Interpreting Physician: Pietro Parrish M.D.  Primary Sonographer:    Santos De La Cruz RDCS    CPT:                ECHO TTE W CON FU LTD - .m;LIMITED SPECTRAL -                      35133.m;DEFINITY ECHO CONTRAST PER ML WASTED -                      .m;DEFINITY ECHO CONTRAST PER ML - .m  Indication(s):      Subsequent non ST elevation (NSTEMI) myocardial infarction -                      I22.2  Procedure:          Limited transthoracic echocardiogram.  Ordering Location:  [facility]  Contrast Injection: Verbal consent was obtained for injection of Ultrasonic                      Enhancing Agent following a discussion of risks and                      benefits.                      Endocardial visualization enhanced with 2 ml of Definity                  Ultrasound enhancing agent (Lot#:[id number] Exp.Date:10/1/2024                      Discarded Dose:8ml).  UEA Reaction:       Patient had no adverse reaction after injection of                      Ultrasound Enhancing Agent.  Study Information:  Image quality for this study is adequate.    _______________________________________________________________________________________     CONCLUSIONS:      1. After obtaining consent, Definity ultrasound enhancing agent was given for enhanced left ventricular opacification and improved delineation of the left ventricular endocardial borders. Left ventricular systolic function is severely decreased with a calculated ejection fraction of 22 % by the Chinchilla's biplane method of disks. There is a left ventricular thrombus.   2. Findings were discussed with Litzy BOSS on 11/2/2023 at 8.49am.   3. There is a left ventricular thrombus.    ________________________________________________________________________________________  FINDINGS:     Left Ventricle:  After obtaining consent, Definity ultrasound enhancing agent was given for enhanced left ventricular opacification and improved delineation of the left ventricular endocardial borders. Left ventricular systolic function is severely decreased with a calculated ejection fraction of 22 % by the Chinchilla's biplane method of disks. There is global left ventricular hypokinesis. There is a left ventricular thrombus.     Right Ventricle:  Tricuspid annular plane systolic excursion (TAPSE) is 1.6 cm (normal >=1.7 cm).  ____________________________________________________________________  Quantitative Data:  Left Ventricle Measurements: (Indexed to BSA)     IVSd (2D):   0.9 cm  LVPWd (2D):  1.3 cm  LVIDd (2D):  4.8 cm  LV Mass:     189 g92.1 g/m²  LV Vol d, MOD A2C: 288.0 ml 140.32 ml/m²  LV Vol d, MOD A4C: 266.0 ml 129.60 ml/m²  LV Vol d, MOD BP:  276.4 ml 134.66 ml/m²  LV Vol s, MOD A2C: 213.0 ml 103.78 ml/m²  LV Vol s, MOD A4C: 213.0 ml 103.78 ml/m²  LV Vol s, MOD BP:  214.6 ml 104.55 ml/m²  LVOT SV MOD BP:    61.8 ml  LV EF% MOD BP:     22 %       Right Ventricle Measurements:       < end of copied text >

## 2023-11-11 NOTE — PROGRESS NOTE ADULT - SUBJECTIVE AND OBJECTIVE BOX
Admission date:   Chief complaint/ Diagnosis  HPI:  pt seen and approx 1:30 pm  in [facility]    57yo M w/ hx HTN, CAD w/ 1 stent in 2009, ICH (2008) presenting with abn ekg. Patient presented to [facility] where he was found to have STEMI, recommended to get cath however patient did not want to get it there so it left and came here.  Patient initially had cough, congestion, fever, was placed on antibiotics on Sunday.  Started feeling nauseous and had a presyncopal event after which he presented to ED last night.  Had chest pain as well.  Chest pain is midsternal.  Not currently having chest pain.  Received 4 aspirin 30 min pta. (01 Nov 2023 15:11)    Interval history: Unevenful overnight  REVIEW OF SYSTEMS  Denies CP, Palpitation, SOB, Dyspnea  [  ] All other systems negative  [  ] Unable to assess ROS because ________    MEDICATIONS  (STANDING):  aMIOdarone Infusion 0.5 mG/Min (16.7 mL/Hr) IV Continuous <Continuous>  aspirin  chewable 81 milliGRAM(s) Oral daily  atorvastatin 80 milliGRAM(s) Oral at bedtime  budesonide  80 MICROgram(s)/formoterol 4.5 MICROgram(s) Inhaler 2 Puff(s) Inhalation two times a day  chlorhexidine 2% Cloths 1 Application(s) Topical daily  dextrose 5% + sodium chloride 0.9%. 1000 milliLiter(s) (35 mL/Hr) IV Continuous <Continuous>  dextrose 50% Injectable 25 Gram(s) IV Push once  dextrose 50% Injectable 12.5 Gram(s) IV Push once  glucagon  Injectable 1 milliGRAM(s) IntraMuscular once  heparin  Infusion 1600 Unit(s)/Hr (15 mL/Hr) IV Continuous <Continuous>  hydrALAZINE 50 milliGRAM(s) Oral three times a day  insulin regular Infusion 3 Unit(s)/Hr (3 mL/Hr) IV Continuous <Continuous>  isosorbide   dinitrate Tablet (ISORDIL) 20 milliGRAM(s) Oral three times a day  lidocaine   4% Patch 1 Patch Transdermal every 24 hours  lidocaine   Infusion 1 mG/Min (7.5 mL/Hr) IV Continuous <Continuous>  milrinone Infusion 0.125 MICROgram(s)/kG/Min (3.71 mL/Hr) IV Continuous <Continuous>  pantoprazole  Injectable 40 milliGRAM(s) IV Push daily  spironolactone 25 milliGRAM(s) Oral daily  ticagrelor 90 milliGRAM(s) Oral every 12 hours  tiotropium 2.5 MICROgram(s) Inhaler 2 Puff(s) Inhalation daily    MEDICATIONS  (PRN):  albuterol/ipratropium for Nebulization 3 milliLiter(s) Nebulizer every 6 hours PRN Shortness of Breath and/or Wheezing  LORazepam     Tablet 0.5 milliGRAM(s) Oral every 6 hours PRN Anxiety      PAST MEDICAL & SURGICAL HISTORY:  HTN (hypertension)      CAD (coronary artery disease)  2009; stent      Intracranial hemorrhage  2008      Respiratory arrest  december 1st      Myocardial infarction, unspecified MI type, unspecified artery      History of coronary artery stent placement      FAMILY HISTORY:  Family history of meningitis (Sibling)  Brother, death at age 49 from complications of infection (June 2015)    Family history of hypertension in mother  Mother      penicillins (Unknown)      ICU Vital Signs Last 24 Hrs  T(C): 37.3 (10 Nov 2023 20:00), Max: 38 (10 Nov 2023 08:00)  T(F): 99.1 (10 Nov 2023 20:00), Max: 100.4 (10 Nov 2023 08:00)  HR: 91 (11 Nov 2023 04:00) (86 - 99)  BP: --  BP(mean): --  ABP: 112/66 (11 Nov 2023 04:00) (101/63 - 235/333)  ABP(mean): 90 (11 Nov 2023 04:00) (83 - 226)  RR: 16 (11 Nov 2023 04:00) (15 - 26)  SpO2: 95% (11 Nov 2023 04:00) (92% - 97%)    O2 Parameters below as of 11 Nov 2023 00:42  Patient On (Oxygen Delivery Method): nasal cannula w/ humidification  O2 Flow (L/min): 5        Mode: CPAP with PS  FiO2: 30  PEEP: 5  PS: 5  MAP: 11  PIP: 23    I&O's Summary    09 Nov 2023 07:01  -  10 Nov 2023 07:00  --------------------------------------------------------  IN: 2257.6 mL / OUT: 1685 mL / NET: 572.6 mL    10 Nov 2023 07:01  -  11 Nov 2023 06:46  --------------------------------------------------------  IN: 2436.4 mL / OUT: 3455 mL / NET: -1018.6 mL      Daily     Daily     PHYSICAL EXAM  Appearance: Normal, NAD  HEAD:  Atraumatic, Normocephalic  EYES: EOMI, PERRLA, conjunctiva and sclera clear  NECK: Supple, No JVD  CHEST/LUNG: Clear to auscultation bilaterally; No wheezing  HEART: Normal S1, S2. No murmurs, rubs, or gallops  ABDOMEN: + Bowel sounds, Soft, NT, ND   EXTREMITIES:  2+ Peripheral Pulses, No clubbing, cyanosis, or edema  NEUROLOGY: non-focal, aaox3  Lymphatic: No lymphadenopathy  SKIN: No rashes or lesions    Interpretation of Telemetry:                          11.5 12.84 )-----------( 266      ( 11 Nov 2023 00:51 )             34.2       11-11    134<L>  |  100  |  53<H>  ----------------------------<  113<H>  3.8   |  19<L>  |  3.92<H>    Ca    8.8      11 Nov 2023 00:51  Phos  4.7     11-11  Mg     2.5     11-11    TPro  7.0  /  Alb  3.3  /  TBili  0.9  /  DBili  x   /  AST  78<H>  /  ALT  132<H>  /  AlkPhos  70  11-11            ABG - ( 11 Nov 2023 00:44 )  pH, Arterial: 7.42  pH, Blood: x     /  pCO2: 31    /  pO2: 165   / HCO3: 20    / Base Excess: -3.5  /  SaO2: 99.5                CAPILLARY BLOOD GLUCOSE      POCT Blood Glucose.: 129 mg/dL (11 Nov 2023 06:12)  POCT Blood Glucose.: 119 mg/dL (11 Nov 2023 05:09)  POCT Blood Glucose.: 112 mg/dL (11 Nov 2023 04:24)  POCT Blood Glucose.: 118 mg/dL (11 Nov 2023 03:17)  POCT Blood Glucose.: 112 mg/dL (11 Nov 2023 01:56)  POCT Blood Glucose.: 109 mg/dL (11 Nov 2023 01:04)  POCT Blood Glucose.: 122 mg/dL (11 Nov 2023 00:23)  POCT Blood Glucose.: 142 mg/dL (10 Nov 2023 23:12)  POCT Blood Glucose.: 177 mg/dL (10 Nov 2023 22:04)  POCT Blood Glucose.: 197 mg/dL (10 Nov 2023 21:01)  POCT Blood Glucose.: 170 mg/dL (10 Nov 2023 20:00)  POCT Blood Glucose.: 139 mg/dL (10 Nov 2023 18:43)  POCT Blood Glucose.: 115 mg/dL (10 Nov 2023 15:12)  POCT Blood Glucose.: 112 mg/dL (10 Nov 2023 14:15)  POCT Blood Glucose.: 111 mg/dL (10 Nov 2023 11:57)  POCT Blood Glucose.: 116 mg/dL (10 Nov 2023 10:17)  POCT Blood Glucose.: 118 mg/dL (10 Nov 2023 07:44)   CICU DAY NOTE  Admission date: 11/1/23  Chief complaint/ Diagnosis: cardiogenic shock   HPI: 57yo M w/ hx HTN, CAD w/ 1 stent in 2009, ICH (2008) presenting with abn ekg. Patient presented to [facility] where he was found to have STEMI, recommended to get cath however patient did not want to get it there so it left and came here.  Patient initially had cough, congestion, fever, was placed on antibiotics on Sunday.  Started feeling nauseous and had a presyncopal event after which he presented to ED last night.  Had chest pain as well.  Chest pain is midsternal.  Not currently having chest pain.  Received 4 aspirin 30 min pta. (01 Nov 2023 15:11)    Interval history: Unevenful overnight  REVIEW OF SYSTEMS  Denies CP, Palpitation, SOB, Dyspnea [ X ] All other systems negative    MEDICATIONS  (STANDING)  aMIOdarone Infusion 0.5 mG/Min (16.7 mL/Hr) IV Continuous <Continuous>  aspirin  chewable 81 milliGRAM(s) Oral daily  atorvastatin 80 milliGRAM(s) Oral at bedtime  budesonide  80 MICROgram(s)/formoterol 4.5 MICROgram(s) Inhaler 2 Puff(s) Inhalation two times a day  chlorhexidine 2% Cloths 1 Application(s) Topical daily  dextrose 5% + sodium chloride 0.9%. 1000 milliLiter(s) (35 mL/Hr) IV Continuous <Continuous>  dextrose 50% Injectable 25 Gram(s) IV Push once  dextrose 50% Injectable 12.5 Gram(s) IV Push once  glucagon  Injectable 1 milliGRAM(s) IntraMuscular once  heparin  Infusion 1600 Unit(s)/Hr (15 mL/Hr) IV Continuous <Continuous>  hydrALAZINE 50 milliGRAM(s) Oral three times a day  insulin regular Infusion 3 Unit(s)/Hr (3 mL/Hr) IV Continuous <Continuous>  isosorbide   dinitrate Tablet (ISORDIL) 20 milliGRAM(s) Oral three times a day  lidocaine   4% Patch 1 Patch Transdermal every 24 hours  lidocaine   Infusion 1 mG/Min (7.5 mL/Hr) IV Continuous <Continuous>  milrinone Infusion 0.125 MICROgram(s)/kG/Min (3.71 mL/Hr) IV Continuous <Continuous>  pantoprazole  Injectable 40 milliGRAM(s) IV Push daily  spironolactone 25 milliGRAM(s) Oral daily  ticagrelor 90 milliGRAM(s) Oral every 12 hours  tiotropium 2.5 MICROgram(s) Inhaler 2 Puff(s) Inhalation daily    MEDICATIONS  (PRN):  albuterol/ipratropium for Nebulization 3 milliLiter(s) Nebulizer every 6 hours PRN Shortness of Breath and/or Wheezing  LORazepam     Tablet 0.5 milliGRAM(s) Oral every 6 hours PRN Anxiety    PAST MEDICAL & SURGICAL HISTORY:  HTN (hypertension)  Myocardial infarction, unspecified MI type, unspecified artery  History of coronary artery stent placement    FAMILY HISTORY:  Family history of meningitis (Sibling)  Brother, death at age 49 from complications of infection (June 2015)  Family history of hypertension in mother    Allergy   penicillins (Unknown)    ICU Vital Signs Last 24 Hrs  T(C): 37.3 (10 Nov 2023 20:00), Max: 38 (10 Nov 2023 08:00)  T(F): 99.1 (10 Nov 2023 20:00), Max: 100.4 (10 Nov 2023 08:00)  HR: 91 (11 Nov 2023 04:00) (86 - 99)  BP: --  BP(mean): --  ABP: 112/66 (11 Nov 2023 04:00) (101/63 - 235/333)  ABP(mean): 90 (11 Nov 2023 04:00) (83 - 226)  RR: 16 (11 Nov 2023 04:00) (15 - 26)  SpO2: 95% (11 Nov 2023 04:00) (92% - 97%)    O2 Parameters below as of 11 Nov 2023 00:42  Patient On (Oxygen Delivery Method): nasal cannula w/ humidification  O2 Flow (L/min): 5        Mode: CPAP with PS  FiO2: 30  PEEP: 5  PS: 5  MAP: 11  PIP: 23    I&O's Summary  IN: 2436.4 mL / OUT: 3455 mL / NET: -1018.6 mL    PHYSICAL EXAM  Appearance: Normal, NAD  HEAD:  Atraumatic, Normocephalic  EYES: EOMI, PERRLA, conjunctiva and sclera clear  NECK: Supple, No JVD  CHEST/LUNG: Clear to auscultation bilaterally; No wheezing  HEART: Normal S1, S2. No murmurs, rubs, or gallops  ABDOMEN: + Bowel sounds, Soft, NT, ND   EXTREMITIES:  2+ Peripheral Pulses, No clubbing, cyanosis, or edema  NEUROLOGY: non-focal, aaox3  Lymphatic: No lymphadenopathy  SKIN: No rashes or lesions    Interpretation of Telemetry:                          11.5   12.84 )-----------( 266                34.2       134<L>  |  100  |  53<H>  ----------------------------<  113<H>  3.8   |  19<L>  |  3.92<H>    Ca    8.8      11 Nov 2023 00:51  Phos  4.7     11-11  Mg     2.5     11-11    TPro  7.0  /  Alb  3.3  /  TBili  0.9  /  DBili  x   /  AST  78<H>  /  ALT  132<H>  /  AlkPhos  70  11-11            ABG - ( 11 Nov 2023 00:44 )  pH, Arterial: 7.42  pH, Blood: x     /  pCO2: 31    /  pO2: 165   / HCO3: 20    / Base Excess: -3.5  /  SaO2: 99.5                CAPILLARY BLOOD GLUCOSE      POCT Blood Glucose.: 129 mg/dL (11 Nov 2023 06:12)  POCT Blood Glucose.: 119 mg/dL (11 Nov 2023 05:09)  POCT Blood Glucose.: 112 mg/dL (11 Nov 2023 04:24)  POCT Blood Glucose.: 118 mg/dL (11 Nov 2023 03:17)  POCT Blood Glucose.: 112 mg/dL (11 Nov 2023 01:56)  POCT Blood Glucose.: 109 mg/dL (11 Nov 2023 01:04)  POCT Blood Glucose.: 122 mg/dL (11 Nov 2023 00:23)  POCT Blood Glucose.: 142 mg/dL (10 Nov 2023 23:12)  POCT Blood Glucose.: 177 mg/dL (10 Nov 2023 22:04)  POCT Blood Glucose.: 197 mg/dL (10 Nov 2023 21:01)  POCT Blood Glucose.: 170 mg/dL (10 Nov 2023 20:00)  POCT Blood Glucose.: 139 mg/dL (10 Nov 2023 18:43)  POCT Blood Glucose.: 115 mg/dL (10 Nov 2023 15:12)  POCT Blood Glucose.: 112 mg/dL (10 Nov 2023 14:15)  POCT Blood Glucose.: 111 mg/dL (10 Nov 2023 11:57)  POCT Blood Glucose.: 116 mg/dL (10 Nov 2023 10:17)  POCT Blood Glucose.: 118 mg/dL (10 Nov 2023 07:44)   CICU DAY NOTE  Admission date: 11/1/23  Chief complaint/ Diagnosis: cardiogenic shock   HPI: 59yo M w/ hx HTN, CAD w/ 1 stent in 2009, ICH (2008) presenting with abn ekg. Patient presented to [facility] where he was found to have STEMI, recommended to get cath however patient did not want to get it there so it left and came here.  Patient initially had cough, congestion, fever, was placed on antibiotics on Sunday.  Started feeling nauseous and had a presyncopal event after which he presented to ED last night.  Had chest pain as well.  Chest pain is midsternal.  Not currently having chest pain.  Received 4 aspirin 30 min pta. (01 Nov 2023 15:11)    Interval history: Received the patient on milrinone@0.125, Lido, Amio0.5, Hep, Insulin and bumax 0.5    REVIEW OF SYSTEMS  Denies CP, Palpitation, SOB, Dyspnea [ X ] All other systems negative    MEDICATIONS  (STANDING)  aMIOdarone Infusion 0.5 mG/Min (16.7 mL/Hr) IV Continuous <Continuous>  aspirin  chewable 81 milliGRAM(s) Oral daily  atorvastatin 80 milliGRAM(s) Oral at bedtime  budesonide  80 MICROgram(s)/formoterol 4.5 MICROgram(s) Inhaler 2 Puff(s) Inhalation two times a day  chlorhexidine 2% Cloths 1 Application(s) Topical daily  dextrose 5% + sodium chloride 0.9%. 1000 milliLiter(s) (35 mL/Hr) IV Continuous <Continuous>  dextrose 50% Injectable 25 Gram(s) IV Push once  dextrose 50% Injectable 12.5 Gram(s) IV Push once  glucagon  Injectable 1 milliGRAM(s) IntraMuscular once  heparin  Infusion 1600 Unit(s)/Hr (15 mL/Hr) IV Continuous <Continuous>  hydrALAZINE 50 milliGRAM(s) Oral three times a day  insulin regular Infusion 3 Unit(s)/Hr (3 mL/Hr) IV Continuous <Continuous>  isosorbide   dinitrate Tablet (ISORDIL) 20 milliGRAM(s) Oral three times a day  lidocaine   4% Patch 1 Patch Transdermal every 24 hours  lidocaine   Infusion 1 mG/Min (7.5 mL/Hr) IV Continuous <Continuous>  milrinone Infusion 0.125 MICROgram(s)/kG/Min (3.71 mL/Hr) IV Continuous <Continuous>  pantoprazole  Injectable 40 milliGRAM(s) IV Push daily  spironolactone 25 milliGRAM(s) Oral daily  ticagrelor 90 milliGRAM(s) Oral every 12 hours  tiotropium 2.5 MICROgram(s) Inhaler 2 Puff(s) Inhalation daily    MEDICATIONS  (PRN):  albuterol/ipratropium for Nebulization 3 milliLiter(s) Nebulizer every 6 hours PRN Shortness of Breath and/or Wheezing  LORazepam     Tablet 0.5 milliGRAM(s) Oral every 6 hours PRN Anxiety    PAST MEDICAL & SURGICAL HISTORY:  HTN (hypertension)  Myocardial infarction, unspecified MI type, unspecified artery  History of coronary artery stent placement    FAMILY HISTORY:  Family history of meningitis (Sibling)  Brother, death at age 49 from complications of infection (June 2015)  Family history of hypertension in mother    Allergy   penicillins (Unknown)    ICU Vital Signs Last 24 Hrs  T(C): 37.3 (Max: 38)  HR: 91  (86 - 99)  ABP: 112/66 (101/63 - 235/333)  ABP(mean): 90 (83 - 226)  RR: 16  (15 - 26)  SpO2: 95%  (92% - 97%) on NC 5L     I&O's Summary  IN: 2436.4 mL / OUT: 3455 mL / NET: -1018.6 mL    PHYSICAL EXAM  Appearance: Normal, NAD  HEAD:  Atraumatic, Normocephalic  EYES: EOMI, PERRLA, conjunctiva and sclera clear  NECK: Supple, No JVD  CHEST/LUNG: Clear to auscultation bilaterally; No wheezing  HEART: Normal S1, S2. No murmurs, rubs, or gallops  ABDOMEN: + Bowel sounds, Soft, NT, ND   EXTREMITIES:  2+ Peripheral Pulses, No clubbing, cyanosis, or edema  NEUROLOGY: non-focal, aaox3  SKIN: No rashes or lesions    Interpretation of Telemetry:                        11.5   12.84 )-----------( 266                34.2       134<L>  |  100  |  53<H>  ----------------------------<  113<H>  3.8   |  19<L>  |  3.92<H>    Ca    8.8      Phos  4.7     Mg     2.5      TPro  7.0  /  Alb  3.3  /  TBili  0.9  /  DBili  x   /  AST  78<H>  /  ALT  132<H>  /  AlkPhos  70     ABG - ( 11 Nov 2023 00:44 )  pH, Arterial: 7.42 /  pCO2: 31    /  pO2: 165   / HCO3: 20    / Base Excess: -3.5  /  SaO2: 99.5      F/S 112-129       CICU DAY NOTE  Admission date: 11/1/23  Chief complaint/ Diagnosis: cardiogenic shock   HPI: 59yo M w/ hx HTN, CAD w/ 1 stent in 2009, ICH (2008) presenting with abn ekg. Patient presented to [facility] where he was found to have STEMI, recommended to get cath however patient did not want to get it there so it left and came here.  Patient initially had cough, congestion, fever, was placed on antibiotics on Sunday.  Started feeling nauseous and had a presyncopal event after which he presented to ED last night.  Had chest pain as well.  Chest pain is midsternal.  Not currently having chest pain.  Received 4 aspirin 30 min pta. (01 Nov 2023 15:11)    Interval history: Received the patient on milrinone@0.125, Lido, Amio0.5, Hep, Insulin gtt  cvp ~3    REVIEW OF SYSTEMS  Denies CP, Palpitation, SOB, Dyspnea [ X ] All other systems negative    MEDICATIONS  (STANDING)  aMIOdarone Infusion 0.5 mG/Min (16.7 mL/Hr) IV Continuous <Continuous>  aspirin  chewable 81 milliGRAM(s) Oral daily  atorvastatin 80 milliGRAM(s) Oral at bedtime  budesonide  80 MICROgram(s)/formoterol 4.5 MICROgram(s) Inhaler 2 Puff(s) Inhalation two times a day  chlorhexidine 2% Cloths 1 Application(s) Topical daily  dextrose 5% + sodium chloride 0.9%. 1000 milliLiter(s) (35 mL/Hr) IV Continuous <Continuous>  dextrose 50% Injectable 25 Gram(s) IV Push once  dextrose 50% Injectable 12.5 Gram(s) IV Push once  glucagon  Injectable 1 milliGRAM(s) IntraMuscular once  heparin  Infusion 1600 Unit(s)/Hr (15 mL/Hr) IV Continuous <Continuous>  hydrALAZINE 50 milliGRAM(s) Oral three times a day  insulin regular Infusion 3 Unit(s)/Hr (3 mL/Hr) IV Continuous <Continuous>  isosorbide   dinitrate Tablet (ISORDIL) 20 milliGRAM(s) Oral three times a day  lidocaine   4% Patch 1 Patch Transdermal every 24 hours  lidocaine   Infusion 1 mG/Min (7.5 mL/Hr) IV Continuous <Continuous>  milrinone Infusion 0.125 MICROgram(s)/kG/Min (3.71 mL/Hr) IV Continuous <Continuous>  pantoprazole  Injectable 40 milliGRAM(s) IV Push daily  spironolactone 25 milliGRAM(s) Oral daily  ticagrelor 90 milliGRAM(s) Oral every 12 hours  tiotropium 2.5 MICROgram(s) Inhaler 2 Puff(s) Inhalation daily    MEDICATIONS  (PRN):  albuterol/ipratropium for Nebulization 3 milliLiter(s) Nebulizer every 6 hours PRN Shortness of Breath and/or Wheezing  LORazepam     Tablet 0.5 milliGRAM(s) Oral every 6 hours PRN Anxiety    PAST MEDICAL & SURGICAL HISTORY:  HTN (hypertension)  Myocardial infarction, unspecified MI type, unspecified artery  History of coronary artery stent placement    FAMILY HISTORY:  Family history of meningitis (Sibling)  Brother, death at age 49 from complications of infection (June 2015)  Family history of hypertension in mother    Allergy   penicillins (Unknown)    ICU Vital Signs Last 24 Hrs  T(C): 37.3 (Max: 38)  HR: 91  (86 - 99)  ABP: 112/66 (101/63 - 235/333)  ABP(mean): 90 (83 - 226)  RR: 16  (15 - 26)  SpO2: 95%  (92% - 97%) on NC 5L     I&O's Summary  IN: 2436.4 mL / OUT: 3455 mL / NET: -1018.6 mL    PHYSICAL EXAM  Appearance: Normal, NAD  HEAD:  Atraumatic, Normocephalic  EYES: EOMI, PERRLA, conjunctiva and sclera clear  NECK: Supple, No JVD  CHEST/LUNG: Clear to auscultation bilaterally; No wheezing  HEART: Normal S1, S2. No murmurs, rubs, or gallops  ABDOMEN: + Bowel sounds, Soft, NT, ND   EXTREMITIES:  2+ Peripheral Pulses, No clubbing, cyanosis, or edema  NEUROLOGY: non-focal, aaox3  SKIN: No rashes or lesions    Interpretation of Telemetry:                        11.5   12.84 )-----------( 266                34.2       134<L>  |  100  |  53<H>  ----------------------------<  113<H>  3.8   |  19<L>  |  3.92<H>    Ca    8.8      Phos  4.7     Mg     2.5      TPro  7.0  /  Alb  3.3  /  TBili  0.9  /  DBili  x   /  AST  78<H>  /  ALT  132<H>  /  AlkPhos  70     ABG - ( 11 Nov 2023 00:44 )  pH, Arterial: 7.42 /  pCO2: 31    /  pO2: 165   / HCO3: 20    / Base Excess: -3.5  /  SaO2: 99.5      F/S 112-129

## 2023-11-11 NOTE — PROGRESS NOTE ADULT - ASSESSMENT
59 yo male h/o htn, cad s/p pci, ICH, here with NSTEMI  s/p intubation and cath. now in CCU    NSTEMI  s/p  cath  cath results noted. multi vessel dz., CTSx f/u.   hep gtt  pt with vtach/fib arrest again on 11/8. s/p ACLS and ROSC.   now extubated  IABP in place  mngt as per CCU  plan for transplant as per HF and transplant team    LV thrombus   hep gtt    acute on chronic systolic heart failure  heart failure team f/u      pna  recently diagnosed outpt  iv abx now stopped  f/u cult   id following     covid  supportive care    h/o ICH  resolved    agitation  psy consult f/u    mngt as per CCU team  d/w CCU attn    d/w pts pmd        Advanced care planning was discussed with patient and family.  Advanced care planning forms were reviewed and discussed as appropriate.  Differential diagnosis and plan of care discussed with patient after the evaluation.   Pain assessed and judicious use of narcotics when appropriate was discussed.  Importance of Fall prevention discussed.  Counseling on Smoking and Alcohol cessation was offered when appropriate.  Counseling on Diet, exercise, and medication compliance was done.       Approx 75 minutes spent.

## 2023-11-12 LAB
ALBUMIN SERPL ELPH-MCNC: 3.1 G/DL — LOW (ref 3.3–5)
ALBUMIN SERPL ELPH-MCNC: 3.1 G/DL — LOW (ref 3.3–5)
ALBUMIN SERPL ELPH-MCNC: 3.3 G/DL — SIGNIFICANT CHANGE UP (ref 3.3–5)
ALBUMIN SERPL ELPH-MCNC: 3.3 G/DL — SIGNIFICANT CHANGE UP (ref 3.3–5)
ALBUMIN SERPL ELPH-MCNC: 3.4 G/DL — SIGNIFICANT CHANGE UP (ref 3.3–5)
ALBUMIN SERPL ELPH-MCNC: 3.4 G/DL — SIGNIFICANT CHANGE UP (ref 3.3–5)
ALP SERPL-CCNC: 61 U/L — SIGNIFICANT CHANGE UP (ref 40–120)
ALP SERPL-CCNC: 61 U/L — SIGNIFICANT CHANGE UP (ref 40–120)
ALP SERPL-CCNC: 64 U/L — SIGNIFICANT CHANGE UP (ref 40–120)
ALP SERPL-CCNC: 64 U/L — SIGNIFICANT CHANGE UP (ref 40–120)
ALP SERPL-CCNC: 65 U/L — SIGNIFICANT CHANGE UP (ref 40–120)
ALP SERPL-CCNC: 65 U/L — SIGNIFICANT CHANGE UP (ref 40–120)
ALT FLD-CCNC: 92 U/L — HIGH (ref 10–45)
ALT FLD-CCNC: 92 U/L — HIGH (ref 10–45)
ALT FLD-CCNC: 93 U/L — HIGH (ref 10–45)
ALT FLD-CCNC: 93 U/L — HIGH (ref 10–45)
ALT FLD-CCNC: 98 U/L — HIGH (ref 10–45)
ALT FLD-CCNC: 98 U/L — HIGH (ref 10–45)
ANION GAP SERPL CALC-SCNC: 12 MMOL/L — SIGNIFICANT CHANGE UP (ref 5–17)
ANION GAP SERPL CALC-SCNC: 12 MMOL/L — SIGNIFICANT CHANGE UP (ref 5–17)
ANION GAP SERPL CALC-SCNC: 14 MMOL/L — SIGNIFICANT CHANGE UP (ref 5–17)
APTT BLD: 58.7 SEC — HIGH (ref 24.5–35.6)
APTT BLD: 58.7 SEC — HIGH (ref 24.5–35.6)
APTT BLD: 74.2 SEC — HIGH (ref 24.5–35.6)
APTT BLD: 74.2 SEC — HIGH (ref 24.5–35.6)
APTT BLD: 75 SEC — HIGH (ref 24.5–35.6)
APTT BLD: 75 SEC — HIGH (ref 24.5–35.6)
AST SERPL-CCNC: 41 U/L — HIGH (ref 10–40)
AST SERPL-CCNC: 41 U/L — HIGH (ref 10–40)
AST SERPL-CCNC: 44 U/L — HIGH (ref 10–40)
AST SERPL-CCNC: 44 U/L — HIGH (ref 10–40)
AST SERPL-CCNC: 46 U/L — HIGH (ref 10–40)
AST SERPL-CCNC: 46 U/L — HIGH (ref 10–40)
BASE EXCESS BLDMV CALC-SCNC: -2.4 MMOL/L — SIGNIFICANT CHANGE UP (ref -3–3)
BASE EXCESS BLDMV CALC-SCNC: -2.4 MMOL/L — SIGNIFICANT CHANGE UP (ref -3–3)
BASE EXCESS BLDMV CALC-SCNC: -3.1 MMOL/L — LOW (ref -3–3)
BASE EXCESS BLDMV CALC-SCNC: -3.1 MMOL/L — LOW (ref -3–3)
BASOPHILS # BLD AUTO: 0.03 K/UL — SIGNIFICANT CHANGE UP (ref 0–0.2)
BASOPHILS NFR BLD AUTO: 0.3 % — SIGNIFICANT CHANGE UP (ref 0–2)
BILIRUB SERPL-MCNC: 0.8 MG/DL — SIGNIFICANT CHANGE UP (ref 0.2–1.2)
BILIRUB SERPL-MCNC: 0.8 MG/DL — SIGNIFICANT CHANGE UP (ref 0.2–1.2)
BILIRUB SERPL-MCNC: 0.9 MG/DL — SIGNIFICANT CHANGE UP (ref 0.2–1.2)
BODY SURFACE AREA CALCULATION: 1.73 M2 — SIGNIFICANT CHANGE UP
BODY SURFACE AREA CALCULATION: 1.73 M2 — SIGNIFICANT CHANGE UP
BUN SERPL-MCNC: 38 MG/DL — HIGH (ref 7–23)
BUN SERPL-MCNC: 38 MG/DL — HIGH (ref 7–23)
BUN SERPL-MCNC: 40 MG/DL — HIGH (ref 7–23)
BUN SERPL-MCNC: 40 MG/DL — HIGH (ref 7–23)
BUN SERPL-MCNC: 42 MG/DL — HIGH (ref 7–23)
BUN SERPL-MCNC: 42 MG/DL — HIGH (ref 7–23)
CALCIUM SERPL-MCNC: 9 MG/DL — SIGNIFICANT CHANGE UP (ref 8.4–10.5)
CALCIUM SERPL-MCNC: 9.4 MG/DL — SIGNIFICANT CHANGE UP (ref 8.4–10.5)
CALCIUM SERPL-MCNC: 9.4 MG/DL — SIGNIFICANT CHANGE UP (ref 8.4–10.5)
CHLORIDE SERPL-SCNC: 103 MMOL/L — SIGNIFICANT CHANGE UP (ref 96–108)
CHLORIDE SERPL-SCNC: 105 MMOL/L — SIGNIFICANT CHANGE UP (ref 96–108)
CHLORIDE SERPL-SCNC: 105 MMOL/L — SIGNIFICANT CHANGE UP (ref 96–108)
CO2 BLDMV-SCNC: 22 MMOL/L — SIGNIFICANT CHANGE UP (ref 21–29)
CO2 BLDMV-SCNC: 22 MMOL/L — SIGNIFICANT CHANGE UP (ref 21–29)
CO2 BLDMV-SCNC: 23 MMOL/L — SIGNIFICANT CHANGE UP (ref 21–29)
CO2 BLDMV-SCNC: 23 MMOL/L — SIGNIFICANT CHANGE UP (ref 21–29)
CO2 SERPL-SCNC: 18 MMOL/L — LOW (ref 22–31)
CO2 SERPL-SCNC: 19 MMOL/L — LOW (ref 22–31)
CO2 SERPL-SCNC: 19 MMOL/L — LOW (ref 22–31)
COLLECT DURATION TIME UR: 24 HR — SIGNIFICANT CHANGE UP
COLLECT DURATION TIME UR: 24 HR — SIGNIFICANT CHANGE UP
CREAT CL ?TM UR+SERPL-VRATE: 41 ML/MIN — LOW (ref 85–125)
CREAT CL ?TM UR+SERPL-VRATE: 41 ML/MIN — LOW (ref 85–125)
CREAT SERPL-MCNC: 2.25 MG/DL — HIGH (ref 0.5–1.3)
CREAT SERPL-MCNC: 2.25 MG/DL — HIGH (ref 0.5–1.3)
CREAT SERPL-MCNC: 2.48 MG/DL — HIGH (ref 0.5–1.3)
CREAT SERPL-MCNC: 2.48 MG/DL — HIGH (ref 0.5–1.3)
CREAT SERPL-MCNC: 2.74 MG/DL — HIGH (ref 0.5–1.3)
CREAT SERPL-MCNC: 2.74 MG/DL — HIGH (ref 0.5–1.3)
CREATININE, URINE RESULT: 85 MG/DL — SIGNIFICANT CHANGE UP
CREATININE, URINE RESULT: 85 MG/DL — SIGNIFICANT CHANGE UP
EGFR: 26 ML/MIN/1.73M2 — LOW
EGFR: 26 ML/MIN/1.73M2 — LOW
EGFR: 29 ML/MIN/1.73M2 — LOW
EGFR: 29 ML/MIN/1.73M2 — LOW
EGFR: 33 ML/MIN/1.73M2 — LOW
EGFR: 33 ML/MIN/1.73M2 — LOW
EOSINOPHIL # BLD AUTO: 0.23 K/UL — SIGNIFICANT CHANGE UP (ref 0–0.5)
EOSINOPHIL # BLD AUTO: 0.23 K/UL — SIGNIFICANT CHANGE UP (ref 0–0.5)
EOSINOPHIL # BLD AUTO: 0.24 K/UL — SIGNIFICANT CHANGE UP (ref 0–0.5)
EOSINOPHIL # BLD AUTO: 0.24 K/UL — SIGNIFICANT CHANGE UP (ref 0–0.5)
EOSINOPHIL NFR BLD AUTO: 2.1 % — SIGNIFICANT CHANGE UP (ref 0–6)
EOSINOPHIL NFR BLD AUTO: 2.1 % — SIGNIFICANT CHANGE UP (ref 0–6)
EOSINOPHIL NFR BLD AUTO: 2.2 % — SIGNIFICANT CHANGE UP (ref 0–6)
EOSINOPHIL NFR BLD AUTO: 2.2 % — SIGNIFICANT CHANGE UP (ref 0–6)
GAS PNL BLDA: SIGNIFICANT CHANGE UP
GAS PNL BLDA: SIGNIFICANT CHANGE UP
GAS PNL BLDMV: SIGNIFICANT CHANGE UP
GLUCOSE BLDC GLUCOMTR-MCNC: 124 MG/DL — HIGH (ref 70–99)
GLUCOSE BLDC GLUCOMTR-MCNC: 124 MG/DL — HIGH (ref 70–99)
GLUCOSE BLDC GLUCOMTR-MCNC: 132 MG/DL — HIGH (ref 70–99)
GLUCOSE BLDC GLUCOMTR-MCNC: 132 MG/DL — HIGH (ref 70–99)
GLUCOSE BLDC GLUCOMTR-MCNC: 141 MG/DL — HIGH (ref 70–99)
GLUCOSE BLDC GLUCOMTR-MCNC: 141 MG/DL — HIGH (ref 70–99)
GLUCOSE BLDC GLUCOMTR-MCNC: 157 MG/DL — HIGH (ref 70–99)
GLUCOSE BLDC GLUCOMTR-MCNC: 157 MG/DL — HIGH (ref 70–99)
GLUCOSE SERPL-MCNC: 133 MG/DL — HIGH (ref 70–99)
GLUCOSE SERPL-MCNC: 133 MG/DL — HIGH (ref 70–99)
GLUCOSE SERPL-MCNC: 151 MG/DL — HIGH (ref 70–99)
GLUCOSE SERPL-MCNC: 151 MG/DL — HIGH (ref 70–99)
GLUCOSE SERPL-MCNC: 153 MG/DL — HIGH (ref 70–99)
GLUCOSE SERPL-MCNC: 153 MG/DL — HIGH (ref 70–99)
HCO3 BLDMV-SCNC: 21 MMOL/L — SIGNIFICANT CHANGE UP (ref 20–28)
HCO3 BLDMV-SCNC: 21 MMOL/L — SIGNIFICANT CHANGE UP (ref 20–28)
HCO3 BLDMV-SCNC: 22 MMOL/L — SIGNIFICANT CHANGE UP (ref 20–28)
HCO3 BLDMV-SCNC: 22 MMOL/L — SIGNIFICANT CHANGE UP (ref 20–28)
HCT VFR BLD CALC: 33.6 % — LOW (ref 39–50)
HCT VFR BLD CALC: 33.6 % — LOW (ref 39–50)
HCT VFR BLD CALC: 34.9 % — LOW (ref 39–50)
HCT VFR BLD CALC: 34.9 % — LOW (ref 39–50)
HGB BLD-MCNC: 11.1 G/DL — LOW (ref 13–17)
HGB BLD-MCNC: 11.1 G/DL — LOW (ref 13–17)
HGB BLD-MCNC: 11.6 G/DL — LOW (ref 13–17)
HGB BLD-MCNC: 11.6 G/DL — LOW (ref 13–17)
HOROWITZ INDEX BLDMV+IHG-RTO: 21 — SIGNIFICANT CHANGE UP
HOROWITZ INDEX BLDMV+IHG-RTO: 21 — SIGNIFICANT CHANGE UP
IMM GRANULOCYTES NFR BLD AUTO: 0.5 % — SIGNIFICANT CHANGE UP (ref 0–0.9)
INR BLD: 1.29 RATIO — HIGH (ref 0.85–1.18)
INR BLD: 1.29 RATIO — HIGH (ref 0.85–1.18)
LACTATE BLDV-MCNC: 0.8 MMOL/L — SIGNIFICANT CHANGE UP (ref 0.5–2)
LACTATE BLDV-MCNC: 0.8 MMOL/L — SIGNIFICANT CHANGE UP (ref 0.5–2)
LYMPHOCYTES # BLD AUTO: 1.21 K/UL — SIGNIFICANT CHANGE UP (ref 1–3.3)
LYMPHOCYTES # BLD AUTO: 1.21 K/UL — SIGNIFICANT CHANGE UP (ref 1–3.3)
LYMPHOCYTES # BLD AUTO: 1.49 K/UL — SIGNIFICANT CHANGE UP (ref 1–3.3)
LYMPHOCYTES # BLD AUTO: 1.49 K/UL — SIGNIFICANT CHANGE UP (ref 1–3.3)
LYMPHOCYTES # BLD AUTO: 11.1 % — LOW (ref 13–44)
LYMPHOCYTES # BLD AUTO: 11.1 % — LOW (ref 13–44)
LYMPHOCYTES # BLD AUTO: 13.8 % — SIGNIFICANT CHANGE UP (ref 13–44)
LYMPHOCYTES # BLD AUTO: 13.8 % — SIGNIFICANT CHANGE UP (ref 13–44)
MAGNESIUM SERPL-MCNC: 2.2 MG/DL — SIGNIFICANT CHANGE UP (ref 1.6–2.6)
MCHC RBC-ENTMCNC: 28.8 PG — SIGNIFICANT CHANGE UP (ref 27–34)
MCHC RBC-ENTMCNC: 28.8 PG — SIGNIFICANT CHANGE UP (ref 27–34)
MCHC RBC-ENTMCNC: 28.9 PG — SIGNIFICANT CHANGE UP (ref 27–34)
MCHC RBC-ENTMCNC: 28.9 PG — SIGNIFICANT CHANGE UP (ref 27–34)
MCHC RBC-ENTMCNC: 33 GM/DL — SIGNIFICANT CHANGE UP (ref 32–36)
MCHC RBC-ENTMCNC: 33 GM/DL — SIGNIFICANT CHANGE UP (ref 32–36)
MCHC RBC-ENTMCNC: 33.2 GM/DL — SIGNIFICANT CHANGE UP (ref 32–36)
MCHC RBC-ENTMCNC: 33.2 GM/DL — SIGNIFICANT CHANGE UP (ref 32–36)
MCV RBC AUTO: 87 FL — SIGNIFICANT CHANGE UP (ref 80–100)
MONOCYTES # BLD AUTO: 0.89 K/UL — SIGNIFICANT CHANGE UP (ref 0–0.9)
MONOCYTES # BLD AUTO: 0.89 K/UL — SIGNIFICANT CHANGE UP (ref 0–0.9)
MONOCYTES # BLD AUTO: 1.1 K/UL — HIGH (ref 0–0.9)
MONOCYTES # BLD AUTO: 1.1 K/UL — HIGH (ref 0–0.9)
MONOCYTES NFR BLD AUTO: 10.1 % — SIGNIFICANT CHANGE UP (ref 2–14)
MONOCYTES NFR BLD AUTO: 10.1 % — SIGNIFICANT CHANGE UP (ref 2–14)
MONOCYTES NFR BLD AUTO: 8.3 % — SIGNIFICANT CHANGE UP (ref 2–14)
MONOCYTES NFR BLD AUTO: 8.3 % — SIGNIFICANT CHANGE UP (ref 2–14)
NEUTROPHILS # BLD AUTO: 8.08 K/UL — HIGH (ref 1.8–7.4)
NEUTROPHILS # BLD AUTO: 8.08 K/UL — HIGH (ref 1.8–7.4)
NEUTROPHILS # BLD AUTO: 8.3 K/UL — HIGH (ref 1.8–7.4)
NEUTROPHILS # BLD AUTO: 8.3 K/UL — HIGH (ref 1.8–7.4)
NEUTROPHILS NFR BLD AUTO: 74.9 % — SIGNIFICANT CHANGE UP (ref 43–77)
NEUTROPHILS NFR BLD AUTO: 74.9 % — SIGNIFICANT CHANGE UP (ref 43–77)
NEUTROPHILS NFR BLD AUTO: 75.9 % — SIGNIFICANT CHANGE UP (ref 43–77)
NEUTROPHILS NFR BLD AUTO: 75.9 % — SIGNIFICANT CHANGE UP (ref 43–77)
NRBC # BLD: 0 /100 WBCS — SIGNIFICANT CHANGE UP (ref 0–0)
O2 CT VFR BLD CALC: 47 MMHG — SIGNIFICANT CHANGE UP (ref 30–65)
O2 CT VFR BLD CALC: 47 MMHG — SIGNIFICANT CHANGE UP (ref 30–65)
O2 CT VFR BLD CALC: 48 MMHG — SIGNIFICANT CHANGE UP (ref 30–65)
O2 CT VFR BLD CALC: 48 MMHG — SIGNIFICANT CHANGE UP (ref 30–65)
PCO2 BLDMV: 34 MMHG — SIGNIFICANT CHANGE UP (ref 30–65)
PCO2 BLDMV: 34 MMHG — SIGNIFICANT CHANGE UP (ref 30–65)
PCO2 BLDMV: 35 MMHG — SIGNIFICANT CHANGE UP (ref 30–65)
PCO2 BLDMV: 35 MMHG — SIGNIFICANT CHANGE UP (ref 30–65)
PH BLDMV: 7.39 — SIGNIFICANT CHANGE UP (ref 7.32–7.45)
PH BLDMV: 7.39 — SIGNIFICANT CHANGE UP (ref 7.32–7.45)
PH BLDMV: 7.41 — SIGNIFICANT CHANGE UP (ref 7.32–7.45)
PH BLDMV: 7.41 — SIGNIFICANT CHANGE UP (ref 7.32–7.45)
PHOSPHATE SERPL-MCNC: 3.3 MG/DL — SIGNIFICANT CHANGE UP (ref 2.5–4.5)
PHOSPHATE SERPL-MCNC: 3.3 MG/DL — SIGNIFICANT CHANGE UP (ref 2.5–4.5)
PHOSPHATE SERPL-MCNC: 3.4 MG/DL — SIGNIFICANT CHANGE UP (ref 2.5–4.5)
PHOSPHATE SERPL-MCNC: 3.4 MG/DL — SIGNIFICANT CHANGE UP (ref 2.5–4.5)
PLATELET # BLD AUTO: 240 K/UL — SIGNIFICANT CHANGE UP (ref 150–400)
PLATELET # BLD AUTO: 240 K/UL — SIGNIFICANT CHANGE UP (ref 150–400)
PLATELET # BLD AUTO: 245 K/UL — SIGNIFICANT CHANGE UP (ref 150–400)
PLATELET # BLD AUTO: 245 K/UL — SIGNIFICANT CHANGE UP (ref 150–400)
POTASSIUM SERPL-MCNC: 4.2 MMOL/L — SIGNIFICANT CHANGE UP (ref 3.5–5.3)
POTASSIUM SERPL-MCNC: 4.3 MMOL/L — SIGNIFICANT CHANGE UP (ref 3.5–5.3)
POTASSIUM SERPL-MCNC: 4.3 MMOL/L — SIGNIFICANT CHANGE UP (ref 3.5–5.3)
POTASSIUM SERPL-SCNC: 4.2 MMOL/L — SIGNIFICANT CHANGE UP (ref 3.5–5.3)
POTASSIUM SERPL-SCNC: 4.3 MMOL/L — SIGNIFICANT CHANGE UP (ref 3.5–5.3)
POTASSIUM SERPL-SCNC: 4.3 MMOL/L — SIGNIFICANT CHANGE UP (ref 3.5–5.3)
PROCALCITONIN SERPL-MCNC: 1.01 NG/ML — HIGH (ref 0.02–0.1)
PROCALCITONIN SERPL-MCNC: 1.01 NG/ML — HIGH (ref 0.02–0.1)
PROT ?TM UR-MCNC: 24 MG/DL — HIGH (ref 0–12)
PROT ?TM UR-MCNC: 24 MG/DL — HIGH (ref 0–12)
PROT SERPL-MCNC: 6.8 G/DL — SIGNIFICANT CHANGE UP (ref 6–8.3)
PROT SERPL-MCNC: 6.8 G/DL — SIGNIFICANT CHANGE UP (ref 6–8.3)
PROT SERPL-MCNC: 6.9 G/DL — SIGNIFICANT CHANGE UP (ref 6–8.3)
PROT SERPL-MCNC: 6.9 G/DL — SIGNIFICANT CHANGE UP (ref 6–8.3)
PROT SERPL-MCNC: 7.3 G/DL — SIGNIFICANT CHANGE UP (ref 6–8.3)
PROT SERPL-MCNC: 7.3 G/DL — SIGNIFICANT CHANGE UP (ref 6–8.3)
PROTHROM AB SERPL-ACNC: 14.1 SEC — HIGH (ref 9.5–13)
PROTHROM AB SERPL-ACNC: 14.1 SEC — HIGH (ref 9.5–13)
RBC # BLD: 3.86 M/UL — LOW (ref 4.2–5.8)
RBC # BLD: 3.86 M/UL — LOW (ref 4.2–5.8)
RBC # BLD: 4.01 M/UL — LOW (ref 4.2–5.8)
RBC # BLD: 4.01 M/UL — LOW (ref 4.2–5.8)
RBC # FLD: 14.5 % — SIGNIFICANT CHANGE UP (ref 10.3–14.5)
RBC # FLD: 14.5 % — SIGNIFICANT CHANGE UP (ref 10.3–14.5)
RBC # FLD: 14.6 % — HIGH (ref 10.3–14.5)
RBC # FLD: 14.6 % — HIGH (ref 10.3–14.5)
SAO2 % BLDMV: 78.6 — SIGNIFICANT CHANGE UP (ref 60–90)
SAO2 % BLDMV: 78.6 — SIGNIFICANT CHANGE UP (ref 60–90)
SAO2 % BLDMV: 78.7 — SIGNIFICANT CHANGE UP (ref 60–90)
SAO2 % BLDMV: 78.7 — SIGNIFICANT CHANGE UP (ref 60–90)
SODIUM SERPL-SCNC: 134 MMOL/L — LOW (ref 135–145)
SODIUM SERPL-SCNC: 134 MMOL/L — LOW (ref 135–145)
SODIUM SERPL-SCNC: 135 MMOL/L — SIGNIFICANT CHANGE UP (ref 135–145)
SODIUM SERPL-SCNC: 135 MMOL/L — SIGNIFICANT CHANGE UP (ref 135–145)
SODIUM SERPL-SCNC: 137 MMOL/L — SIGNIFICANT CHANGE UP (ref 135–145)
SODIUM SERPL-SCNC: 137 MMOL/L — SIGNIFICANT CHANGE UP (ref 135–145)
TOTAL VOLUME - 24 HOUR: 2850 ML — SIGNIFICANT CHANGE UP
TOTAL VOLUME - 24 HOUR: 2850 ML — SIGNIFICANT CHANGE UP
UFH PPP CHRO-ACNC: 0.47 IU/ML — SIGNIFICANT CHANGE UP (ref 0.3–0.7)
UFH PPP CHRO-ACNC: 0.47 IU/ML — SIGNIFICANT CHANGE UP (ref 0.3–0.7)
URINE CREATININE CALCULATION: 2.4 G/24 H — HIGH (ref 1–2)
URINE CREATININE CALCULATION: 2.4 G/24 H — HIGH (ref 1–2)
WBC # BLD: 10.78 K/UL — HIGH (ref 3.8–10.5)
WBC # BLD: 10.78 K/UL — HIGH (ref 3.8–10.5)
WBC # BLD: 10.92 K/UL — HIGH (ref 3.8–10.5)
WBC # BLD: 10.92 K/UL — HIGH (ref 3.8–10.5)
WBC # FLD AUTO: 10.78 K/UL — HIGH (ref 3.8–10.5)
WBC # FLD AUTO: 10.78 K/UL — HIGH (ref 3.8–10.5)
WBC # FLD AUTO: 10.92 K/UL — HIGH (ref 3.8–10.5)
WBC # FLD AUTO: 10.92 K/UL — HIGH (ref 3.8–10.5)

## 2023-11-12 PROCEDURE — 71045 X-RAY EXAM CHEST 1 VIEW: CPT | Mod: 26

## 2023-11-12 PROCEDURE — 99232 SBSQ HOSP IP/OBS MODERATE 35: CPT

## 2023-11-12 PROCEDURE — 93010 ELECTROCARDIOGRAM REPORT: CPT

## 2023-11-12 PROCEDURE — 99291 CRITICAL CARE FIRST HOUR: CPT | Mod: 25

## 2023-11-12 PROCEDURE — 99292 CRITICAL CARE ADDL 30 MIN: CPT

## 2023-11-12 RX ORDER — ACETAMINOPHEN 500 MG
1000 TABLET ORAL ONCE
Refills: 0 | Status: COMPLETED | OUTPATIENT
Start: 2023-11-12 | End: 2023-11-12

## 2023-11-12 RX ORDER — SODIUM CHLORIDE 9 MG/ML
4 INJECTION INTRAMUSCULAR; INTRAVENOUS; SUBCUTANEOUS EVERY 12 HOURS
Refills: 0 | Status: ACTIVE | OUTPATIENT
Start: 2023-11-12 | End: 2024-10-10

## 2023-11-12 RX ADMIN — Medication 100 MILLIGRAM(S): at 14:07

## 2023-11-12 RX ADMIN — LIDOCAINE 1 PATCH: 4 CREAM TOPICAL at 02:26

## 2023-11-12 RX ADMIN — Medication 81 MILLIGRAM(S): at 12:16

## 2023-11-12 RX ADMIN — Medication 0.5 MILLIGRAM(S): at 02:36

## 2023-11-12 RX ADMIN — TICAGRELOR 90 MILLIGRAM(S): 90 TABLET ORAL at 17:36

## 2023-11-12 RX ADMIN — Medication 1000 MILLIGRAM(S): at 18:21

## 2023-11-12 RX ADMIN — AMIODARONE HYDROCHLORIDE 400 MILLIGRAM(S): 400 TABLET ORAL at 14:07

## 2023-11-12 RX ADMIN — LIDOCAINE 1 PATCH: 4 CREAM TOPICAL at 12:16

## 2023-11-12 RX ADMIN — Medication 400 MILLIGRAM(S): at 02:53

## 2023-11-12 RX ADMIN — HEPARIN SODIUM 16 UNIT(S)/HR: 5000 INJECTION INTRAVENOUS; SUBCUTANEOUS at 19:38

## 2023-11-12 RX ADMIN — Medication 8 UNIT(S): at 08:57

## 2023-11-12 RX ADMIN — Medication 100 MILLIGRAM(S): at 04:11

## 2023-11-12 RX ADMIN — HEPARIN SODIUM 16 UNIT(S)/HR: 5000 INJECTION INTRAVENOUS; SUBCUTANEOUS at 09:15

## 2023-11-12 RX ADMIN — TICAGRELOR 90 MILLIGRAM(S): 90 TABLET ORAL at 06:11

## 2023-11-12 RX ADMIN — INSULIN GLARGINE 8 UNIT(S): 100 INJECTION, SOLUTION SUBCUTANEOUS at 21:19

## 2023-11-12 RX ADMIN — BUDESONIDE AND FORMOTEROL FUMARATE DIHYDRATE 2 PUFF(S): 160; 4.5 AEROSOL RESPIRATORY (INHALATION) at 20:53

## 2023-11-12 RX ADMIN — AMIODARONE HYDROCHLORIDE 400 MILLIGRAM(S): 400 TABLET ORAL at 21:19

## 2023-11-12 RX ADMIN — Medication 3.75 MG/MIN: at 19:37

## 2023-11-12 RX ADMIN — HEPARIN SODIUM 16 UNIT(S)/HR: 5000 INJECTION INTRAVENOUS; SUBCUTANEOUS at 14:39

## 2023-11-12 RX ADMIN — ISOSORBIDE DINITRATE 40 MILLIGRAM(S): 5 TABLET ORAL at 06:18

## 2023-11-12 RX ADMIN — TIOTROPIUM BROMIDE 2 PUFF(S): 18 CAPSULE ORAL; RESPIRATORY (INHALATION) at 09:12

## 2023-11-12 RX ADMIN — LIDOCAINE 1 PATCH: 4 CREAM TOPICAL at 19:30

## 2023-11-12 RX ADMIN — Medication 400 MILLIGRAM(S): at 10:19

## 2023-11-12 RX ADMIN — ATORVASTATIN CALCIUM 80 MILLIGRAM(S): 80 TABLET, FILM COATED ORAL at 21:19

## 2023-11-12 RX ADMIN — AMIODARONE HYDROCHLORIDE 400 MILLIGRAM(S): 400 TABLET ORAL at 06:11

## 2023-11-12 RX ADMIN — Medication 100 MILLIGRAM(S): at 06:11

## 2023-11-12 RX ADMIN — Medication 1: at 08:57

## 2023-11-12 RX ADMIN — BUDESONIDE AND FORMOTEROL FUMARATE DIHYDRATE 2 PUFF(S): 160; 4.5 AEROSOL RESPIRATORY (INHALATION) at 09:12

## 2023-11-12 RX ADMIN — ISOSORBIDE DINITRATE 40 MILLIGRAM(S): 5 TABLET ORAL at 17:36

## 2023-11-12 RX ADMIN — PANTOPRAZOLE SODIUM 40 MILLIGRAM(S): 20 TABLET, DELAYED RELEASE ORAL at 12:16

## 2023-11-12 RX ADMIN — Medication 8 UNIT(S): at 16:56

## 2023-11-12 RX ADMIN — HEPARIN SODIUM 16 UNIT(S)/HR: 5000 INJECTION INTRAVENOUS; SUBCUTANEOUS at 07:47

## 2023-11-12 RX ADMIN — Medication 0.5 MILLIGRAM(S): at 08:55

## 2023-11-12 RX ADMIN — Medication 8 UNIT(S): at 12:28

## 2023-11-12 RX ADMIN — Medication 3.75 MG/MIN: at 07:46

## 2023-11-12 RX ADMIN — Medication 1000 MILLIGRAM(S): at 11:15

## 2023-11-12 RX ADMIN — ISOSORBIDE DINITRATE 40 MILLIGRAM(S): 5 TABLET ORAL at 10:19

## 2023-11-12 RX ADMIN — Medication 100 MILLIGRAM(S): at 21:19

## 2023-11-12 RX ADMIN — Medication 0.5 MILLIGRAM(S): at 17:51

## 2023-11-12 RX ADMIN — Medication 400 MILLIGRAM(S): at 17:51

## 2023-11-12 RX ADMIN — Medication 1000 MILLIGRAM(S): at 03:32

## 2023-11-12 RX ADMIN — HEPARIN SODIUM 16 UNIT(S)/HR: 5000 INJECTION INTRAVENOUS; SUBCUTANEOUS at 01:31

## 2023-11-12 RX ADMIN — SODIUM CHLORIDE 4 MILLILITER(S): 9 INJECTION INTRAMUSCULAR; INTRAVENOUS; SUBCUTANEOUS at 20:53

## 2023-11-12 RX ADMIN — SPIRONOLACTONE 25 MILLIGRAM(S): 25 TABLET, FILM COATED ORAL at 06:11

## 2023-11-12 NOTE — PROGRESS NOTE ADULT - SUBJECTIVE AND OBJECTIVE BOX
LD VALENCIA  58y Male  MRN:[id number]    Patient is a 58y old  Male who presents with a chief complaint of NSTEMI (01 Nov 2023 20:29)    HPI:  57yo M w/ hx HTN, CAD w/ 1 stent in 2009, ICH (2008) presenting with abn ekg. Patient presented to [facility] where he was found to have STEMI, recommended to get cath however patient did not want to get it there so it left and came here.  Patient initially had cough, congestion, fever, was placed on antibiotics on Sunday.  Started feeling nauseous and had a presyncopal event after which he presented to ED last night.  Had chest pain as well.  Chest pain is midsternal.  Not currently having chest pain.  Received 4 aspirin 30 min pta. (01 Nov 2023 15:11)      Patient seen and evaluated at bedside in CCU. interval events noted    Interval HPI: remain in ccu. IABP in place     PAST MEDICAL & SURGICAL HISTORY:  HTN (hypertension)      CAD (coronary artery disease)  2009; stent      Intracranial hemorrhage  2008      Respiratory arrest  december 1st      Myocardial infarction, unspecified MI type, unspecified artery      History of coronary artery stent placement          REVIEW OF SYSTEMS:  as per hpi     VITALS:   ICU Vital Signs Last 24 Hrs  T(C): 37.3 (12 Nov 2023 11:00), Max: 37.6 (12 Nov 2023 04:00)  T(F): 99.1 (12 Nov 2023 11:00), Max: 99.7 (12 Nov 2023 04:00)  HR: 89 (12 Nov 2023 11:00) (85 - 93)  BP: --  BP(mean): --  ABP: 109/66 (12 Nov 2023 11:00) (105/62 - 127/69)  ABP(mean): 87 (12 Nov 2023 11:00) (84 - 199)  RR: 18 (12 Nov 2023 11:00) (18 - 25)  SpO2: 94% (12 Nov 2023 11:00) (92% - 100%)    O2 Parameters below as of 12 Nov 2023 11:00  Patient On (Oxygen Delivery Method): room air              PHYSICAL EXAM:  GENERAL: NAD, comfortable   HEAD:  Atraumatic, Normocephalic  EYES: EOMI, PERRLA, conjunctiva and sclera clear  NECK: Supple, No JVD +central line  CHEST/LUNG: Clear to auscultation bilaterally; No wheeze  HEART: Regular rate and rhythm; No murmurs, rubs, or gallops  ABDOMEN: Soft, Nontender, Nondistended; Bowel sounds present  EXTREMITIES:  2+ Peripheral Pulses, No clubbing, cyanosis, or edema  NEUROLOGY: nonfocal  SKIN: No rashes or lesions  +iabp    Consultant(s) Notes Reviewed:  [x ] YES  [ ] NO  Care Discussed with Consultants/Other Providers [ x] YES  [ ] NO    MEDS:   MEDICATIONS  (STANDING):  aMIOdarone    Tablet   Oral   aMIOdarone    Tablet 400 milliGRAM(s) Oral every 8 hours  aspirin  chewable 81 milliGRAM(s) Oral daily  atorvastatin 80 milliGRAM(s) Oral at bedtime  budesonide  80 MICROgram(s)/formoterol 4.5 MICROgram(s) Inhaler 2 Puff(s) Inhalation two times a day  chlorhexidine 2% Cloths 1 Application(s) Topical daily  dextrose 50% Injectable 12.5 Gram(s) IV Push once  dextrose 50% Injectable 25 Gram(s) IV Push once  glucagon  Injectable 1 milliGRAM(s) IntraMuscular once  heparin  Infusion 1600 Unit(s)/Hr (16 mL/Hr) IV Continuous <Continuous>  hydrALAZINE 100 milliGRAM(s) Oral three times a day  insulin glargine Injectable (LANTUS) 8 Unit(s) SubCutaneous at bedtime  insulin lispro (ADMELOG) corrective regimen sliding scale   SubCutaneous three times a day before meals  insulin lispro Injectable (ADMELOG) 8 Unit(s) SubCutaneous three times a day before meals  isosorbide   dinitrate Tablet (ISORDIL) 40 milliGRAM(s) Oral three times a day  lidocaine   4% Patch 1 Patch Transdermal every 24 hours  lidocaine   Infusion 0.5 mG/Min (3.75 mL/Hr) IV Continuous <Continuous>  pantoprazole  Injectable 40 milliGRAM(s) IV Push daily  sodium chloride 3%  Inhalation 4 milliLiter(s) Inhalation every 12 hours  spironolactone 25 milliGRAM(s) Oral daily  ticagrelor 90 milliGRAM(s) Oral every 12 hours  tiotropium 2.5 MICROgram(s) Inhaler 2 Puff(s) Inhalation daily    MEDICATIONS  (PRN):  albuterol/ipratropium for Nebulization 3 milliLiter(s) Nebulizer every 6 hours PRN Shortness of Breath and/or Wheezing  guaiFENesin Oral Liquid (Sugar-Free) 100 milliGRAM(s) Oral every 6 hours PRN Cough  LORazepam     Tablet 0.5 milliGRAM(s) Oral every 6 hours PRN Anxiety      ALLERGIES:  penicillins (Unknown)      LABS:                                                                             11.1   10.92 )-----------( 240      ( 12 Nov 2023 01:08 )             33.6   11-12    134<L>  |  103  |  40<H>  ----------------------------<  153<H>  4.3   |  19<L>  |  2.48<H>    Ca    9.0      12 Nov 2023 08:15  Phos  3.3     11-12  Mg     2.2     11-12    TPro  6.9  /  Alb  3.3  /  TBili  0.9  /  DBili  x   /  AST  41<H>  /  ALT  92<H>  /  AlkPhos  61  11-12       < from: Xray Chest 1 View- PORTABLE-Urgent (11.01.23 @ 07:42) >    IMPRESSION:  Clear lungs.    ---End of Report ---        < end of copied text >  < from: TTE W or WO Ultrasound Enhancing Agent (11.01.23 @ 10:23) >  _____________________________     CONCLUSIONS:      1. Left ventricular cavity is moderately dilated. Left ventricular wall thickness is normal. Left ventricular systolic function is severely decreased with an ejection fraction of 32 % by Chinchilla's method of disks. Regional wall motion abnormalities present.   2. Multiple segmental abnormalities exist. See findings.   3. There is moderate (grade 2) left ventricular diastolic dysfunction, with indeterminant filling pressure.   4. Normal right ventricular cavity size, wall thickness, and systolic function.   5. No significant valvular disease.   6. No pericardial effusion seen.   7. Compared to the transthoracic echocardiogram performed on 1/25/2017 the areas of akinesis are unchanged but there has been a decline in LV systolic function with new areas of hypokinesis.    __________________________________________________________________    < end of copied text >  < from: TTE Limited W or WO Ultrasound Enhancing Agent (11.02.23 @ 07:41) >  __________________________     CONCLUSIONS:      1. After obtaining consent, Definity ultrasound enhancing agent was given for enhanced left ventricular opacification and improved delineation of the left ventricular endocardial borders. Left ventricular systolic function is severely decreased with a calculated ejection fraction of 22 % by the Chinchilla's biplane method of disks. There is a left ventricular thrombus.   2. Findings were discussed with Litzy BOSS on 11/2/2023 at 8.49am.   3. There is a left ventricular thrombus.    _________________________________________________________________    < end of copied text >

## 2023-11-12 NOTE — PHYSICAL THERAPY INITIAL EVALUATION ADULT - GENERAL OBSERVATIONS, REHAB EVAL
Patient encountered supine in bed, NAD, + L IABP with LLE extensor brace, +tele, +2L NC, +PIV, +holt Patient encountered supine in bed, NAD, + L IABP with LLE extensor brace, +tele, +rangel +PIV, +holt

## 2023-11-12 NOTE — PROGRESS NOTE ADULT - SUBJECTIVE AND OBJECTIVE BOX
INFECTIOUS DISEASES FOLLOW UP-- Courtney Peters  [phone]    This is a follow up note for this  58yMale with  Non-ST elevation myocardial infarction (NSTEMI)        ROS:  CONSTITUTIONAL:  No fever, good appetite  CARDIOVASCULAR:  No chest pain or palpitations  RESPIRATORY:  No dyspnea  GASTROINTESTINAL:  No nausea, vomiting, diarrhea, or abdominal pain  GENITOURINARY:  No dysuria  NEUROLOGIC:  No headache,     Allergies    penicillins (Unknown)    Intolerances        ANTIBIOTICS/RELEVANT:  antimicrobials    immunologic:    OTHER:  albuterol/ipratropium for Nebulization 3 milliLiter(s) Nebulizer every 6 hours PRN  aMIOdarone    Tablet 400 milliGRAM(s) Oral every 8 hours  aMIOdarone    Tablet   Oral   aspirin  chewable 81 milliGRAM(s) Oral daily  atorvastatin 80 milliGRAM(s) Oral at bedtime  budesonide  80 MICROgram(s)/formoterol 4.5 MICROgram(s) Inhaler 2 Puff(s) Inhalation two times a day  chlorhexidine 2% Cloths 1 Application(s) Topical daily  dextrose 50% Injectable 25 Gram(s) IV Push once  dextrose 50% Injectable 12.5 Gram(s) IV Push once  glucagon  Injectable 1 milliGRAM(s) IntraMuscular once  guaiFENesin Oral Liquid (Sugar-Free) 100 milliGRAM(s) Oral every 6 hours PRN  heparin  Infusion 1600 Unit(s)/Hr IV Continuous <Continuous>  hydrALAZINE 100 milliGRAM(s) Oral three times a day  insulin glargine Injectable (LANTUS) 8 Unit(s) SubCutaneous at bedtime  insulin lispro (ADMELOG) corrective regimen sliding scale   SubCutaneous three times a day before meals  insulin lispro Injectable (ADMELOG) 8 Unit(s) SubCutaneous three times a day before meals  isosorbide   dinitrate Tablet (ISORDIL) 40 milliGRAM(s) Oral three times a day  lidocaine   4% Patch 1 Patch Transdermal every 24 hours  lidocaine   Infusion 0.5 mG/Min IV Continuous <Continuous>  LORazepam     Tablet 0.5 milliGRAM(s) Oral every 6 hours PRN  pantoprazole  Injectable 40 milliGRAM(s) IV Push daily  sodium chloride 3%  Inhalation 4 milliLiter(s) Inhalation every 12 hours  spironolactone 25 milliGRAM(s) Oral daily  ticagrelor 90 milliGRAM(s) Oral every 12 hours  tiotropium 2.5 MICROgram(s) Inhaler 2 Puff(s) Inhalation daily      Objective:  Vital Signs Last 24 Hrs  T(C): 37.3 (12 Nov 2023 11:00), Max: 37.6 (12 Nov 2023 04:00)  T(F): 99.1 (12 Nov 2023 11:00), Max: 99.7 (12 Nov 2023 04:00)  HR: 87 (12 Nov 2023 12:00) (85 - 93)  BP: --  BP(mean): --  RR: 18 (12 Nov 2023 12:00) (18 - 25)  SpO2: 95% (12 Nov 2023 12:00) (92% - 100%)    Parameters below as of 12 Nov 2023 12:00  Patient On (Oxygen Delivery Method): room air        PHYSICAL EXAM:  Constitutional:no acute distress  Eyes:MARVEL, EOMI  Ear/Nose/Throat: no oral lesions, 	  Respiratory: clear BL  Cardiovascular: S1S2  Gastrointestinal:soft, (+) BS, no tenderness  Extremities:no e/e/c  No Lymphadenopathy  IV sites not inflammed.    LABS:                        11.1   10.92 )-----------( 240      ( 12 Nov 2023 01:08 )             33.6     11-12    134<L>  |  103  |  40<H>  ----------------------------<  153<H>  4.3   |  19<L>  |  2.48<H>    Ca    9.0      12 Nov 2023 08:15  Phos  3.3     11-12  Mg     2.2     11-12    TPro  6.9  /  Alb  3.3  /  TBili  0.9  /  DBili  x   /  AST  41<H>  /  ALT  92<H>  /  AlkPhos  61  11-12    PT/INR - ( 12 Nov 2023 01:08 )   PT: 14.1 sec;   INR: 1.29 ratio         PTT - ( 12 Nov 2023 08:15 )  PTT:74.2 sec  Urinalysis Basic - ( 12 Nov 2023 08:15 )    Color: x / Appearance: x / SG: x / pH: x  Gluc: 153 mg/dL / Ketone: x  / Bili: x / Urobili: x   Blood: x / Protein: x / Nitrite: x   Leuk Esterase: x / RBC: x / WBC x   Sq Epi: x / Non Sq Epi: x / Bacteria: x        MICROBIOLOGY:            RECENT CULTURES:  11-10 @ 00:34  .Blood Blood-Peripheral  --  --  --    No growth at 48 Hours  --      RADIOLOGY & ADDITIONAL STUDIES:    < from: Xray Chest 1 View- PORTABLE-Routine (Xray Chest 1 View- PORTABLE-Routine in AM.) (11.12.23 @ 04:41) >  o significant interval change    IMPRESSION:  [place]-Sonja catheter seen in the right pulmonary artery. An IABP is noted   with its tip 2.5 cm from the top the aortic arch.  No focal consolidation.    < end of copied text >

## 2023-11-12 NOTE — PROGRESS NOTE ADULT - SUBJECTIVE AND OBJECTIVE BOX
Subjective    Past Medical History    PAST MEDICAL & SURGICAL HISTORY:  HTN (hypertension)      CAD (coronary artery disease)  2009; stent      Intracranial hemorrhage  2008      Respiratory arrest  december 1st      Myocardial infarction, unspecified MI type, unspecified artery      History of coronary artery stent placement          MEDICATIONS:  aMIOdarone    Tablet 400 milliGRAM(s) Oral every 8 hours  aMIOdarone    Tablet   Oral   aspirin  chewable 81 milliGRAM(s) Oral daily  heparin  Infusion 1600 Unit(s)/Hr IV Continuous <Continuous>  hydrALAZINE 100 milliGRAM(s) Oral three times a day  isosorbide   dinitrate Tablet (ISORDIL) 40 milliGRAM(s) Oral three times a day  lidocaine   Infusion 0.5 mG/Min IV Continuous <Continuous>  spironolactone 25 milliGRAM(s) Oral daily  ticagrelor 90 milliGRAM(s) Oral every 12 hours      albuterol/ipratropium for Nebulization 3 milliLiter(s) Nebulizer every 6 hours PRN  budesonide  80 MICROgram(s)/formoterol 4.5 MICROgram(s) Inhaler 2 Puff(s) Inhalation two times a day  guaiFENesin Oral Liquid (Sugar-Free) 100 milliGRAM(s) Oral every 6 hours PRN  sodium chloride 3%  Inhalation 4 milliLiter(s) Inhalation every 12 hours  tiotropium 2.5 MICROgram(s) Inhaler 2 Puff(s) Inhalation daily    LORazepam     Tablet 0.5 milliGRAM(s) Oral every 6 hours PRN    pantoprazole  Injectable 40 milliGRAM(s) IV Push daily    atorvastatin 80 milliGRAM(s) Oral at bedtime  dextrose 50% Injectable 25 Gram(s) IV Push once  dextrose 50% Injectable 12.5 Gram(s) IV Push once  glucagon  Injectable 1 milliGRAM(s) IntraMuscular once  insulin glargine Injectable (LANTUS) 8 Unit(s) SubCutaneous at bedtime  insulin lispro (ADMELOG) corrective regimen sliding scale   SubCutaneous three times a day before meals  insulin lispro Injectable (ADMELOG) 8 Unit(s) SubCutaneous three times a day before meals    chlorhexidine 2% Cloths 1 Application(s) Topical daily  lidocaine   4% Patch 1 Patch Transdermal every 24 hours        Allergies    penicillins (Unknown)    FAMILY HISTORY:  Family history of meningitis (Sibling)  Brother, death at age 49 from complications of infection (June 2015)    Family history of hypertension in mother  Mother      VITAL SIGNS  T(C): 37.2 (11-12-23 @ 15:00), Max: 37.6 (11-12-23 @ 04:00)  HR: 94 (11-12-23 @ 18:00) (81 - 94)  BP: --  RR: 22 (11-12-23 @ 18:00) (16 - 25)  SpO2: 93% (11-12-23 @ 18:00) (92% - 100%)  Wt(kg): --    IAPB: 124/75; mean 105 Augmented 110 HR 97    Appearance: uncomfortable from rib pain; fractured ribs during CPR  HEENT: Moist Mucous Membranes, Anicteric, PERRL, EOMI  Cardiovascular: Regular rate and rhythm, Normal S1 S2, No JVD, No murmurs. IABP sounds noted. SG right neck  Respiratory: Lungs clear to auscultation. No rales, No rhonchi, No wheezing. No tenderness to palpation  Gastrointestinal:  Soft, Non-tender, + BS  Neurologic: Non-focal, A&Ox3  Skin: Warm and dry,   Musculoskeletal: No clubbing, No cyanosis, No edema  	    		        I&O's Summary    11 Nov 2023 07:01  -  12 Nov 2023 07:00  --------------------------------------------------------  IN: 1437.7 mL / OUT: 2795 mL / NET: -1357.3 mL    12 Nov 2023 07:01  -  12 Nov 2023 18:16  --------------------------------------------------------  IN: 217.8 mL / OUT: 760 mL / NET: -542.2 mL        LABORATORY VALUES	 	                          11.6   10.78 )-----------( 245      ( 12 Nov 2023 17:56 )             34.9       11-12    134<L>  |  103  |  40<H>  ----------------------------<  153<H>  4.3   |  19<L>  |  2.48<H>  11-12    x   |  x   |  x   ----------------------------<  x   x    |  x   |  4.07<H>    Ca    9.0      12 Nov 2023 08:15  Ca    9.0      12 Nov 2023 01:09  Phos  3.3     11-12  Phos  3.7     11-11  Mg     2.2     11-12  Mg     2.4     11-11    TPro  6.9  /  Alb  3.3  /  TBili  0.9  /  DBili  x   /  AST  41<H>  /  ALT  92<H>  /  AlkPhos  61  11-12  TPro  6.8  /  Alb  3.1<L>  /  TBili  0.9  /  DBili  x   /  AST  46<H>  /  ALT  98<H>  /  AlkPhos  65  11-12    LIVER FUNCTIONS - ( 12 Nov 2023 08:15 )  Alb: 3.3 g/dL / Pro: 6.9 g/dL / ALK PHOS: 61 U/L / ALT: 92 U/L / AST: 41 U/L / GGT: x           Activated Partial Thromboplastin Time: 75.0 sec (11-12 @ 13:52)        Blood Gas Venous - Lactate: 0.8 mmol/L (11-12 @ 17:48)    11-10 @ 17:25  Cholesterol, Serum - 130  Direct LDL- --  HDL Cholesterol, Serum- 37  Triglycerides, Serum- 95      Thyroid Stimulating Hormone, Serum: 0.38 uIU/mL (11-10 @ 17:29)    ABG - ( 12 Nov 2023 00:45 )  pH, Arterial: 7.45  pH, Blood: x     /  pCO2: 27    /  pO2: 139   / HCO3: 19    / Base Excess: -4.0  /  SaO2: 100.0             Urinalysis Basic - ( 12 Nov 2023 08:15 )    Color: x / Appearance: x / SG: x / pH: x  Gluc: 153 mg/dL / Ketone: x  / Bili: x / Urobili: x   Blood: x / Protein: x / Nitrite: x   Leuk Esterase: x / RBC: x / WBC x   Sq Epi: x / Non Sq Epi: x / Bacteria: x      POCT Blood Glucose.: 124 mg/dL (12 Nov 2023 16:53)          TELEMETRY: 	  no new events  ECG:  	Reviewed prior ECGs QRS duration varies from 100 to 200 msec with absence and presence of bundle branch block respectively  RADIOLOGY:  OTHER: 	      [ ] Echocardiogram:    TRANSTHORACIC ECHOCARDIOGRAM REPORT  ________________________________________________________________________________                                      _______       Pt. Name:       LD VALENCIA Study Date:    11/11/2023  MRN:            [id number]      YOB: 1965  Accession #:    [id number]       Age:           58 years  Account#:       [id number]    Gender:        M  Heart Rate:                     Height:        69.00 in (175.26 cm)  Rhythm:                         Weight:        218.00 lb (98.88 kg)  Blood Pressure: 110/61 mmHg     BSA/BMI:       2.14 m² / 32.19 kg/m²  ________________________________________________________________________________________  Referring Physician:    [id number] Jorge L Lees  Interpreting Physician: Dhaval Garces M.D.  Primary Sonographer:    Kay Wallace RDCS    CPT:                ECHO TTE W CON FU LTD - .m;LIMITED SPECTRAL -                      99779.m;DEFINITY ECHO CONTRAST PER ML WASTED -                      .m;DEFINITY ECHO CONTRAST PER ML - .m  Indication(s):      Cardiogenic shock - R57.0  Procedure:          Limited transthoracic echocardiogram.  [facility]  Admission Status:   Inpatient  Contrast Injection: Verbal consent was obtained for injection of Ultrasonic                      Enhancing Agent following a discussion of risks and                      benefits.                      Endocardial visualization enhanced with 3 ml of Definity                      Ultrasound enhancingagent (Lot#:[id number] Exp.Date:October 2024                      Discarded Dose:7ml).  UEA Reaction:       Patient had no adverse reaction after injection of                      Ultrasound Enhancing Agent.  Study Information:  Image quality for this study is technically difficult.    _______________________________________________________________________________________     CONCLUSIONS:      1. Technically difficult image quality.   2. Left ventricular systolic function is severely decreased with a calculated ejection fraction of 22 % by the Chinchilla's biplane method of disks. There is global left ventricular hypokinesis. A filling defect is seen and is consistent with a left ventricular thrombus. Severe global left ventricular systolic dysfunction. The apex appears particularly hypokinetic.   3. There is a left ventricular thrombus.   4. The right ventricular cavity is normal in size and normal systolic function. Tricuspid annular plane systolic excursion (TAPSE) is 2.4 cm (normal >=1.7 cm). A device lead is visualized in the right heart.   5. Structurally normal mitral valve with normal leaflet excursion. There is calcification of the mitral valve annulus. There is mild mitral regurgitation.   6. Compared to the transthoracic echocardiogram performed on 11/2/2023 there have been no significant interval changes.    ________________________________________________________________________________________  FINDINGS:     Left Ventricle:  Left ventricular systolic function is severely decreased with a calculated ejection fraction of 22 % by the Chinchilla's biplane method of disks. There is global left ventricular hypokinesis. There is a left ventricular thrombus. A filling defect is seen and is consistent with a left ventricular thrombus. Severe global left ventricular systolic dysfunction. The apex appears particularly hypokinetic.     Right Ventricle:  The right ventricular cavity is normal in size and normal systolic function. Tricuspid annular plane systolic excursion (TAPSE) is 2.4 cm (normal >=1.7 cm). A device lead is visualized in the right heart.     Aortic Valve:  The aortic valve was not well visualized.     Mitral Valve:  Structurally normal mitral valve with normal leaflet excursion. There is calcification of the mitral valve annulus. There is mild mitral regurgitation.     Tricuspid Valve:  Structurally normal tricuspid valve with normal leaflet excursion. There is trace tricuspid regurgitation.     Pulmonic Valve:  The pulmonic valve was not well visualized.     Pericardium:  No pericardial effusion seen.     Systemic Veins:  The inferior vena cava was not well visualized.  ____________________________________________________________________  Quantitative Data:  Left Ventricle Measurements: (Indexed to BSA)     BiPlane LV EF%: 22 %       Right Ventricle Measurements:     TAPSE: 2.4 cm       LVOT / RVOT/ Qp/Qs Data: (Indexed to BSA)  LVOT Vmax: 1.01 m/s  LVOT VTI:  11.87 cm    ________________________________________________________________________________________  Electronically signed on 11/11/2023 at 1:55:09 PM by Dhaval Garces M.D.         *** Final ***

## 2023-11-12 NOTE — PROGRESS NOTE ADULT - SUBJECTIVE AND OBJECTIVE BOX
LD VALENCIA  MRN-[id number]  Patient is a 58y old  Male who presents with a chief complaint of ventricular fibrillation (12 Nov 2023 18:14)    HPI:  pt seen and approx 1:30 pm  in [facility]    59yo M w/ hx HTN, CAD w/ 1 stent in 2009, ICH (2008) presenting with abn ekg. Patient presented to [facility] where he was found to have STEMI, recommended to get cath however patient did not want to get it there so it left and came here.  Patient initially had cough, congestion, fever, was placed on antibiotics on Sunday.  Started feeling nauseous and had a presyncopal event after which he presented to ED last night.  Had chest pain as well.  Chest pain is midsternal.  Not currently having chest pain.  Received 4 aspirin 30 min pta. (01 Nov 2023 15:11)      Surgery/Hospital course:    Overnight events:    REVIEW OF SYSTEMS:    CONSTITUTIONAL: No weakness, fevers or chills  EYES/ENT: No visual changes;  No vertigo or throat pain   NECK: No pain or stiffness  RESPIRATORY: No cough, wheezing, hemoptysis; No shortness of breath  CARDIOVASCULAR: No chest pain or palpitations  GASTROINTESTINAL: No abdominal or epigastric pain. No nausea, vomiting, or hematemesis; No diarrhea or constipation. No melena or hematochezia.  GENITOURINARY: No dysuria, frequency or hematuria  NEUROLOGICAL: No numbness or weakness  SKIN: No itching, rashes      Physical Exam:  Vital Signs Last 24 Hrs  T(C): 36.8 (12 Nov 2023 19:00), Max: 37.6 (12 Nov 2023 04:00)  T(F): 98.2 (12 Nov 2023 19:00), Max: 99.7 (12 Nov 2023 04:00)  HR: 88 (12 Nov 2023 20:00) (81 - 94)  BP: --  BP(mean): --  RR: 27 (12 Nov 2023 20:00) (16 - 27)  SpO2: 94% (12 Nov 2023 20:00) (92% - 100%)    Parameters below as of 12 Nov 2023 20:00  Patient On (Oxygen Delivery Method): room air      Physical Exam:   Constitutional: NAD, well-groomed, well-developed  HEENT: PERRLA, EOMI, no drainage or redness  Neck: supple,  No JVD  Respiratory: Breath Sounds equal & clear bilaterally to auscultation, no rales/rhonchi/wheezing, no accessory muscle use noted  Cardiovascular: Regular rate, regular rhythm, normal S1, S2; no murmurs or rub  Gastrointestinal: Soft, non-tender, non distended, + bowel sounds  Extremities: MOISE x 4, no peripheral edema, no cyanosis, no clubbing   Neurological: A+O x 3; speech clear and intact; no sensory, motor  deficits, normal reflexes  Skin: warm, dry, well perfused  Incisions:    ============================I/O===========================   I&O's Detail    11 Nov 2023 07:01  -  12 Nov 2023 07:00  --------------------------------------------------------  IN:    Amiodarone: 116.9 mL    Heparin: 374.5 mL    Insulin: 14 mL    IV PiggyBack: 300 mL    Lidocaine: 22.5 mL    Lidocaine: 79.8 mL    Oral Fluid: 530 mL  Total IN: 1437.7 mL    OUT:    dextrose 5% + sodium chloride 0.9%: 0 mL    Indwelling Catheter - Urethral (mL): 2795 mL  Total OUT: 2795 mL    Total NET: -1357.3 mL      12 Nov 2023 07:01  -  12 Nov 2023 20:00  --------------------------------------------------------  IN:    Heparin: 192 mL    Lidocaine: 45.6 mL  Total IN: 237.6 mL    OUT:    Indwelling Catheter - Urethral (mL): 760 mL  Total OUT: 760 mL    Total NET: -522.4 mL        ============================ LABS =========================                        11.6   10.78 )-----------( 245      ( 12 Nov 2023 17:56 )             34.9     11-12    137  |  105  |  38<H>  ----------------------------<  133<H>  4.2   |  18<L>  |  2.25<H>    Ca    9.4      12 Nov 2023 17:56  Phos  3.4     11-12  Mg     2.2     11-12    TPro  7.3  /  Alb  3.4  /  TBili  0.8  /  DBili  x   /  AST  44<H>  /  ALT  93<H>  /  AlkPhos  64  11-12    LIVER FUNCTIONS - ( 12 Nov 2023 17:56 )  Alb: 3.4 g/dL / Pro: 7.3 g/dL / ALK PHOS: 64 U/L / ALT: 93 U/L / AST: 44 U/L / GGT: x           PT/INR - ( 12 Nov 2023 01:08 )   PT: 14.1 sec;   INR: 1.29 ratio         PTT - ( 12 Nov 2023 13:52 )  PTT:75.0 sec  ABG - ( 12 Nov 2023 00:45 )  pH, Arterial: 7.45  pH, Blood: x     /  pCO2: 27    /  pO2: 139   / HCO3: 19    / Base Excess: -4.0  /  SaO2: 100.0             Urinalysis Basic - ( 12 Nov 2023 17:56 )    Color: x / Appearance: x / SG: x / pH: x  Gluc: 133 mg/dL / Ketone: x  / Bili: x / Urobili: x   Blood: x / Protein: x / Nitrite: x   Leuk Esterase: x / RBC: x / WBC x   Sq Epi: x / Non Sq Epi: x / Bacteria: x      ======================Micro/Rad/Cardio=================  Culture: Reviewed   CXR: Reviewed  Echo:Reviewed  ======================================================  PAST MEDICAL & SURGICAL HISTORY:  HTN (hypertension)      CAD (coronary artery disease)  2009; stent      Intracranial hemorrhage  2008      Respiratory arrest  december 1st      Myocardial infarction, unspecified MI type, unspecified artery      History of coronary artery stent placement        ====================ASSESSMENT ==============  59 yo M with HTN, CAD (s/p PCI 2008), HFrEF, CVA 2018, and T2DM presenting with chest pressure and unknown tachycardia that was shocked x1, C 11/1 found to have in-stent restenosis of pLAD and  of RCA with elevated RA and PA pressures and severely decreased EF 32%, admitted to CICU for management of cardiogenic shock requiring IABP 11/1 -11/7, with hospital course c/b vfib arrest.     PLAN    NEURO  - No neuro deficits known, baseline AOx3  - Propofol switched to Precedex, d/c post extubation    # Anxiety  - Ativan 0.5 mg PO Q 6hrs PRN   - No Seroquel or antipsychotics since vfib arrest and prolonged QTC (currently 528)  - Psych following    PULM  # Intubated post arrest  - Extubated to HFNC 11/10  - currently on NC 2L w/ O2 SAT >92%  - Oxygen supplement to maintain O2 SAT>90%    # COVID +  - off isolation: cleared by ID today(11/11)     # Asthma (hx of respiratory failure in 2018- intubated for 20 minutes per chart review pt report)  - c/w albuterol, symbicort and spiriva  - on trelegy at home    CARDIOVASCULAR   # Vfib arrest insetting of ischemia vs. shock  - lido@0.5 No further episode on tele   - Resume PO Amio load dose (s/p IV amio ~1950mg lv gtt)  - Unclear role for cMRI at this point   - Keep K > 4, Mag > 2.2     # Cardiogenic shock requiring IABP (11/1- 11/7, 11/9-)  - likely 2/2 NSTEMI and ADHF  - 11/1 LHC: pLAD 100 % in-stent restenosis & mRCA, 100 %. PCWP 30  - 11/1 TTE: LV dilated. EF 32 %. Regional WMAs present, mod (grade 2) LV diastolic dysfunction  - 11/2 TTE: EF 22% and + LV thrombus   - Switching lasix to bumex due to poor UOP response and elevated CVPs  - IABP removed 11/7, reinserted 11/9  - D/C Milrinone gtt 7:30 am 11/10  - currently on hydralazine 100 TID and ISD 40 TID: ALR, as Cr improves consider ARB initiation  - continue romel 25 daily  - continue to monitor perfusion indices   - continue Strict I&O, goal net negative  - Daily weights    # Stent re-occlusion of pLAD and 100%  of RCA  - EKG on admission w/ LBBB  - DAPT: c/w ASA, brillinta   - Cont. lipitor 80  - CT sx not recommending CABG, undergoing AT eval    # LV thrombus  - c/w heparin gtt    # HTN  - afterload reduction as above    /RENAL  #non-oliguric ARIC  - sCr uptrending likely i/s/o post arrest, Baseline Cr: 1-1.22  - diuresis as above  - Trend BMP, lytes daily, replace as needed  - continue Strict I/Os, avoid nephrotoxins    GI  #Transaminitis  - Likely worsening i/s/o cardiogenic shock  - continue to trend daily    - NPO while intubated  - NGT for meds    #GERD  - c/w home protonix    # Bowel regimen  - miralax and senna d/c'd iso loose stools    ENDO  #Type 2 DM  - A1c 8.3, sugars uncontrolled on admission likely stress induced s/p cardiac arrest  - c/w insulin gtt    HEME  - H/H and platelets stable  - continue to trend daily    # VTE prophylaxis   - c/w heparin gtt    ID  - leukocytosis likely s/t cardiac arrest  - afebrile, continue to monitor off abx  - concern for spiration event prior to intubation on admission - s/p vanco and cefepime 11/2)    #COVID  - Maintain airborne precautions    GOC  - Full code      Patient requires continuous monitoring with bedside rhythm monitoring, arterial line, pulse oximetry, ventilator monitoring and intermittent blood gas analysis.  Care plan discussed with ICU care team.  Patient remained critical; required more than usual post op care; I have spent 35 minutes providing non-routine post op care, revaluated multiple times during the day. LD VALENCIA  MRN-[id number]  Patient is a 58y old  Male who presents with a chief complaint of ventricular fibrillation (12 Nov 2023 18:14)    HPI:  pt seen and approx 1:30 pm  in [facility]    59yo M w/ hx HTN, CAD w/ 1 stent in 2009, ICH (2008) presenting with abn ekg. Patient presented to [facility] where he was found to have STEMI, recommended to get cath however patient did not want to get it there so it left and came here.  Patient initially had cough, congestion, fever, was placed on antibiotics on Sunday.  Started feeling nauseous and had a presyncopal event after which he presented to ED last night.  Had chest pain as well.  Chest pain is midsternal.  Not currently having chest pain.  Received 4 aspirin 30 min pta. (01 Nov 2023 15:11)      24 Hours:     REVIEW OF SYSTEMS:    CONSTITUTIONAL: No weakness, fevers or chills  EYES/ENT: No visual changes;  No vertigo or throat pain   NECK: No pain or stiffness  RESPIRATORY: No cough, wheezing, hemoptysis; No shortness of breath  CARDIOVASCULAR: No chest pain or palpitations  GASTROINTESTINAL: No abdominal or epigastric pain. No nausea, vomiting, or hematemesis; No diarrhea or constipation. No melena or hematochezia.  GENITOURINARY: No dysuria, frequency or hematuria  NEUROLOGICAL: No numbness or weakness  SKIN: No itching, rashes      Physical Exam:  Vital Signs Last 24 Hrs  T(C): 36.8 (12 Nov 2023 19:00), Max: 37.6 (12 Nov 2023 04:00)  T(F): 98.2 (12 Nov 2023 19:00), Max: 99.7 (12 Nov 2023 04:00)  HR: 88 (12 Nov 2023 20:00) (81 - 94)  BP: --  BP(mean): --  RR: 27 (12 Nov 2023 20:00) (16 - 27)  SpO2: 94% (12 Nov 2023 20:00) (92% - 100%)    Parameters below as of 12 Nov 2023 20:00  Patient On (Oxygen Delivery Method): room air    ============================I/O===========================   I&O's Detail    11 Nov 2023 07:01  -  12 Nov 2023 07:00  --------------------------------------------------------  IN:    Amiodarone: 116.9 mL    Heparin: 374.5 mL    Insulin: 14 mL    IV PiggyBack: 300 mL    Lidocaine: 22.5 mL    Lidocaine: 79.8 mL    Oral Fluid: 530 mL  Total IN: 1437.7 mL    OUT:    dextrose 5% + sodium chloride 0.9%: 0 mL    Indwelling Catheter - Urethral (mL): 2795 mL  Total OUT: 2795 mL    Total NET: -1357.3 mL      12 Nov 2023 07:01  -  12 Nov 2023 20:00  --------------------------------------------------------  IN:    Heparin: 192 mL    Lidocaine: 45.6 mL  Total IN: 237.6 mL    OUT:    Indwelling Catheter - Urethral (mL): 760 mL  Total OUT: 760 mL    Total NET: -522.4 mL        ============================ LABS =========================                        11.6   10.78 )-----------( 245      ( 12 Nov 2023 17:56 )             34.9     11-12    137  |  105  |  38<H>  ----------------------------<  133<H>  4.2   |  18<L>  |  2.25<H>    Ca    9.4      12 Nov 2023 17:56  Phos  3.4     11-12  Mg     2.2     11-12    TPro  7.3  /  Alb  3.4  /  TBili  0.8  /  DBili  x   /  AST  44<H>  /  ALT  93<H>  /  AlkPhos  64  11-12    LIVER FUNCTIONS - ( 12 Nov 2023 17:56 )  Alb: 3.4 g/dL / Pro: 7.3 g/dL / ALK PHOS: 64 U/L / ALT: 93 U/L / AST: 44 U/L / GGT: x           PT/INR - ( 12 Nov 2023 01:08 )   PT: 14.1 sec;   INR: 1.29 ratio         PTT - ( 12 Nov 2023 13:52 )  PTT:75.0 sec  ABG - ( 12 Nov 2023 00:45 )  pH, Arterial: 7.45  pH, Blood: x     /  pCO2: 27    /  pO2: 139   / HCO3: 19    / Base Excess: -4.0  /  SaO2: 100.0             Urinalysis Basic - ( 12 Nov 2023 17:56 )    Color: x / Appearance: x / SG: x / pH: x  Gluc: 133 mg/dL / Ketone: x  / Bili: x / Urobili: x   Blood: x / Protein: x / Nitrite: x   Leuk Esterase: x / RBC: x / WBC x   Sq Epi: x / Non Sq Epi: x / Bacteria: x      ======================Micro/Rad/Cardio=================  Culture: Reviewed   CXR: Reviewed  Echo:Reviewed  ======================================================  PAST MEDICAL & SURGICAL HISTORY:  HTN (hypertension)      CAD (coronary artery disease)  2009; stent      Intracranial hemorrhage  2008      Respiratory arrest  december 1st      Myocardial infarction, unspecified MI type, unspecified artery      History of coronary artery stent placement        ====================ASSESSMENT ==============  57 yo M with HTN, CAD (s/p PCI 2008), HFrEF, CVA 2018, and T2DM presenting with chest pressure and unknown tachycardia that was shocked x1, [facility] 11/1 found to have in-stent restenosis of pLAD and  of RCA with elevated RA and PA pressures and severely decreased EF 32%, admitted to CICU for management of cardiogenic shock requiring IABP 11/1 -11/7, with hospital course c/b vfib arrest.     PLAN    ====================NEURO====================  - No neuro deficits known, baseline AOx 3  - Propofol switched to Precedex, d/c post extubation    # Anxiety  - Ativan 0.5 mg PO Q 6hrs PRN   - No Seroquel or antipsychotics since vfib arrest and prolonged QTC (currently 528)  - Psych following    ====================PULM====================  # Intubated post arrest 11/9  - Extubated 11/10  - currently on NC 2L w/ O2 SAT >92%  - Oxygen supplement to maintain O2 SAT>90%    # COVID +  - off isolation: cleared by ID today(11/11)     # Asthma (hx of respiratory failure in 2018- intubated for 20 minutes per chart review pt report)  - c/w albuterol, symbicort and spiriva  - on trelegy at home    ====================CARDIOVASCULAR====================  # Vfib arrest insetting of ischemia vs. shock  - lido@0.5 No further episode on tele, likely wean off 11/12   - PO Amio load dose (s/p IV amio ~1950mg lv gtt) for total of 5 grams per EP to complete 11/17  - Unclear role for cMRI at this point   - Keep K > 4, Mag > 2.2     # Cardiogenic shock requiring IABP (11/1- 11/7, 11/9-)  - likely 2/2 NSTEMI and ADHF  - 11/1 LHC: pLAD 100 % in-stent restenosis & mRCA, 100 %. PCWP 30. IABP placed.  - 11/1 TTE: LV dilated. EF 32 %. Regional WMAs present, mod (grade 2) LV diastolic dysfunction  - 11/2 TTE: EF 22% and + LV thrombus   - Switched lasix to bumex due to poor UOP response and elevated CVPs  - IABP removed 11/7, vfib 11/9, IABP later reinserted that day for CS  - D/C Milrinone gtt 7:30 am 11/11  - currently on hydralazine 100 TID and ISD 40 TID: ALR, as Cr improves consider ARB initiation  - continue romel 25 daily  - continue to monitor perfusion indices   - continue Strict I&O, goal net negative  - Daily weights    # Stent re-occlusion of pLAD and 100%  of RCA  - EKG on admission w/LBBB  - DAPT: c/w ASA, brillinta   - Cont. lipitor 80  - CT sx not recommending CABG, undergoing AT eval    # LV thrombus  - c/w heparin gtt    # HTN  - afterload reduction as above    ====================/RENAL====================  #non-oliguric ARIC post arrest  - sCr now improving. Baseline Cr: 1-1.22  - diuresis held  - Trend BMP, lytes daily, replace as needed  - continue Strict I/Os, avoid nephrotoxins    ====================GI====================  #Diet  -Soft and bite sized     #Mild Transaminitis  - continue to trend daily    #GERD  - c/w home protonix    # Bowel regimen  - miralax and senna d/c'd iso loose stools    ====================ENDO====================  #Type 2 DM  - A1c 8.3, sugars uncontrolled on admission likely stress induced s/p cardiac arrest  - insulin gtt transitioned off 11/11 AM  - lantus 8, premeal 8, ISS  - trend FS    ====================HEME====================  - H/H and platelets stable  - continue to trend daily    # VTE prophylaxis   - c/w heparin gtt    ====================ID====================  - leukocytosis likely s/t cardiac arrest  - afebrile, continue to monitor off abx  - concern for aspiration event prior to intubation on admission - s/p vanco and cefepime (11/2)    #COVID  - Off airborne precautions 11/11    GO  - Full code      Patient requires continuous monitoring with bedside rhythm monitoring, arterial line, pulse oximetry, ventilator monitoring and intermittent blood gas analysis.  Care plan discussed with ICU care team.  Patient remained critical; required more than usual post op care; I have spent 35 minutes providing non-routine post op care, revaluated multiple times during the day.

## 2023-11-12 NOTE — PROGRESS NOTE ADULT - CRITICAL CARE ATTENDING COMMENT
History of anxiety disorder and CAD s/p PCI  Admitted with cardiogenic shock and respiratory failure in the setting of stent restenosis  Transferred out and had VT/VF cardiac arrest  Intubated during arrest, ROSC achieved, readmitted to CICU  A+Ox3, anxious - continue Ativan 0.5 q6h  Cardiogenic shock on IABP, Milrinone stopped yesterday, CI 3.8  Maintain IABP and titrate up Bidil for afterload reduction  VT/VF arrest, on Amiodarone PO and Lidocaine infusion   Continue Lidocaine until he has 4-5 g Amio on board (appx 3 g thus far)  Continue DAPT with ASA/Ticagrelor for coronary disease for prior PCI  SpO2 mid 90s on nasal cannula - wean to room air  Soft diet  Non-oliguric ARIC that is improving, likely ATN from arrest, filling pressures are low - holding diuretics  H/H low but acceptable on Heparin drip for LV thrombus  Afebrile, cultures no growth, no antibiotics   Sugars controlled on Lanalejandraus  ANAY Farnsworth/Armando, IABP, rangel 11/9  PT consult

## 2023-11-12 NOTE — PROGRESS NOTE ADULT - ASSESSMENT
57 yo M with HTN, CAD (s/p PCI 2008), HFrEF, CVA 2018, and T2DM presenting with chest pressure and unknown tachycardia that was shocked x1, [facility] 11/1 found to have in-stent restenosis of pLAD and  of RCA with elevated RA and PA pressures and severely decreased EF 32%, admitted to CICU for management of cardiogenic shock requiring IABP 11/1 -11/7, with hospital course c/b vfib arrest.     PLAN    NEURO  - No neuro deficits known, baseline AOx3  - Propofol switched to Precedex, d/c post extubation    # Anxiety  - Ativan 0.5 mg PO Q 6hrs PRN   - No Seroquel or antipsychotics since vfib arrest and prolonged QTC (currently 528)  - Psych following    PULM  # Intubated post arrest  - Extubated to HFNC 11/10  - currently on NC 2L w/ O2 SAT >92%  - Oxygen supplement to maintain O2 SAT>90%    # COVID +  - off isolation: cleared by ID today(11/11)     # Asthma (hx of respiratory failure in 2018- intubated for 20 minutes per chart review pt report)  - c/w albuterol, symbicort and spiriva  - on trelegy at home    CARDIOVASCULAR   # Vfib arrest insetting of ischemia vs. shock  - Titrate down lido@0.5 No further episode on tele   - Resume PO Amio load dose (s/p IV amio ~1950mg lv gtt)  - Unclear role for cMRI at this point   - Keep K > 4, Mag > 2.2     # Cardiogenic shock requiring IABP (11/1- 11/7, 11/9-)  - likely 2/2 NSTEMI and ADHF  - 11/1 [facility]: pLAD 100 % in-stent restenosis & mRCA, 100 %. PCWP 30  - 11/1 TTE: LV dilated. EF 32 %. Regional WMAs present, mod (grade 2) LV diastolic dysfunction  - 11/2 TTE: EF 22% and + LV thrombus   - Switching lasix to bumex due to poor UOP response and elevated CVPs  - IABP removed 11/7, reinserted 11/9  - D/C Milrinone gtt 7:30 am today   - currently on hydralazine 25 TID and ISD 10 TID: titrate up PO ALR  - continue romel 25 daily  - continue to monitor perfusion indices   - continue Strict I&O, goal net negative  - Daily weights    # Stent re-occlusion of pLAD and 100%  of RCA  - EKG on admission w/ LBBB  - DAPT: c/w ASA, brillinta   - Cont. lipitor 80  - CT sx not recommending CABG, undergoing AT eval    # LV thrombus  - c/w heparin gtt    # HTN  - afterload reduction as above    /RENAL  #non-oliguric ARIC  - sCr uptrending likely i/s/o post arrest, Baseline Cr: 1-1.22  - diuresis as above  - Trend BMP, lytes daily, replace as needed  - continue Strict I/Os, avoid nephrotoxins    GI  #Transaminitis  - Likely worsening i/s/o cardiogenic shock  - continue to trend daily    - NPO while intubated  - NGT for meds    #GERD  - c/w home protonix    # Bowel regimen  - miralax and senna d/c'd iso loose stools    ENDO  #Type 2 DM  - A1c 8.3, sugars uncontrolled on admission likely stress induced s/p cardiac arrest  - c/w insulin gtt    HEME  - H/H and platelets stable  - continue to trend daily    # VTE prophylaxis   - c/w heparin gtt    ID  - leukocytosis likely s/t cardiac arrest  - afebrile, continue to monitor off abx  - concern for spiration event prior to intubation on admission - s/p vanco and cefepime 11/2)    #COVID  - Maintain airborne precautions    GOC  - Full code

## 2023-11-12 NOTE — PROGRESS NOTE ADULT - SUBJECTIVE AND OBJECTIVE BOX
CICU DAY NOTE  Admission date: 11/1/23  Chief complaint/ Diagnosis: cardiogenic shock   HPI: 57yo M w/ hx HTN, CAD w/ 1 stent in 2009, ICH (2008) presenting with abn ekg. Patient presented to [facility] where he was found to have STEMI, recommended to get cath however patient did not want to get it there so it left and came here.  Patient initially had cough, congestion, fever, was placed on antibiotics on Sunday.  Started feeling nauseous and had a presyncopal event after which he presented to ED last night.  Had chest pain as well.  Chest pain is midsternal.  Not currently having chest pain.  Received 4 aspirin 30 min pta. (01 Nov 2023 15:11)    Interval history:     REVIEW OF SYSTEMS  Denies CP, Palpitation, SOB, Dyspnea [ x ] All other systems negative    MEDICATIONS  (STANDING)  aMIOdarone    Tablet 400 milliGRAM(s) Oral every 8 hours  aspirin  chewable 81 milliGRAM(s) Oral daily  atorvastatin 80 milliGRAM(s) Oral at bedtime  budesonide  80 MICROgram(s)/formoterol 4.5 MICROgram(s) Inhaler 2 Puff(s) Inhalation two times a day  glucagon  Injectable 1 milliGRAM(s) IntraMuscular once  heparin  Infusion 1600 Unit(s)/Hr (16 mL/Hr) IV Continuous <Continuous>  hydrALAZINE 100 milliGRAM(s) Oral three times a day  insulin glargine Injectable (LANTUS) 8 Unit(s) SubCutaneous at bedtime  insulin lispro (ADMELOG) corrective regimen sliding scale   SubCutaneous three times a day before meals  insulin lispro Injectable (ADMELOG) 8 Unit(s) SubCutaneous three times a day before meals  isosorbide   dinitrate Tablet (ISORDIL) 40 milliGRAM(s) Oral three times a day  lidocaine   4% Patch 1 Patch Transdermal every 24 hours  lidocaine   Infusion 0.5 mG/Min (3.75 mL/Hr) IV Continuous <Continuous>  pantoprazole  Injectable 40 milliGRAM(s) IV Push daily  spironolactone 25 milliGRAM(s) Oral daily  ticagrelor 90 milliGRAM(s) Oral every 12 hours  tiotropium 2.5 MICROgram(s) Inhaler 2 Puff(s) Inhalation daily    MEDICATIONS  (PRN):  albuterol/ipratropium for Nebulization 3 milliLiter(s) Nebulizer every 6 hours PRN Shortness of Breath and/or Wheezing  guaiFENesin Oral Liquid (Sugar-Free) 100 milliGRAM(s) Oral every 6 hours PRN Cough  LORazepam     Tablet 0.5 milliGRAM(s) Oral every 6 hours PRN Anxiety    PAST MEDICAL & SURGICAL HISTORY:  HTN (hypertension)  CAD (coronary artery disease) 2009; stent  Myocardial infarction, unspecified MI type, unspecified artery  History of coronary artery stent placement    FAMILY HISTORY:  Family history of meningitis (Sibling)  Brother, death at age 49 from complications of infection (June 2015)  Family history of hypertension in mother    Allergy   penicillins (Unknown)    ICU Vital Signs Last 24 Hrs  T(C): 37.6 (Max: 37.6)  HR: 93  (85 - 93)  ABP: 125/85 (105/58 - 127/69)  ABP(mean): 106 (84 - 199)  RR: 36  (16 - 36)  SpO2: 94%  (92% - 97%) on room air     I&O's Summary  IN: 1337.7 mL / OUT: 2795 mL / NET: -1457.3 mL    PHYSICAL EXAM  Appearance: Normal, NAD  HEAD:  Normocephalic  EYES: PERRLA, conjunctiva and sclera clear  NECK: Supple, No JVD  CHEST/LUNG: Clear to auscultation bilaterally; No wheezing  HEART: Normal S1, S2. No murmurs, rubs, or gallops  ABDOMEN: + Bowel sounds, Soft, NT, ND   EXTREMITIES:  2+ Peripheral Pulses, No clubbing, cyanosis, or edema  NEUROLOGY: non-focal, aaox3  SKIN: No rashes or lesions, left groin w/o bleeding or hematoma     Interpretation of Telemetry:                        11.1   10.92 )-----------( 240      ( 12 Nov 2023 01:08 )             33.6       x   |  x   |  x   ----------------------------<  x   x    |  x   |  4.07<H>    Ca    9.0      Phos 3.3     Mg     2.2     TPro  6.8  /  Alb  3.1<L>  /  TBili  0.9  /  DBili  x   /  AST  46<H>  /  ALT  98<H>  /  AlkPhos  65  11-12    ABG - ( 12 Nov 2023 00:45 )  pH, Arterial: 7.45   /  pCO2: 27    /  pO2: 139   / HCO3: 19    / Base Excess: -4.0  /  SaO2: 100.0   F/S 125-207             CICU DAY NOTE  Admission date: 11/1/23  Chief complaint/ Diagnosis: cardiogenic shock   HPI: 59yo M w/ hx HTN, CAD w/ 1 stent in 2009, ICH (2008) presenting with abn ekg. Patient presented to [facility] where he was found to have STEMI, recommended to get cath however patient did not want to get it there so it left and came here.  Patient initially had cough, congestion, fever, was placed on antibiotics on Sunday.  Started feeling nauseous and had a presyncopal event after which he presented to ED last night.  Had chest pain as well.  Chest pain is midsternal.  Not currently having chest pain.  Received 4 aspirin 30 min pta. (01 Nov 2023 15:11)    Interval history: s/p CT scan (AT work up)  currently on Lido 0.5 and heparin   PA 42/6(23), CVP2, Aug Mean 90'S   Mixed 78.6 CI 3.8  Lactate 0.7    REVIEW OF SYSTEMS  Denies CP, Palpitation, SOB, Dyspnea [ x ] All other systems negative    MEDICATIONS  (STANDING)  aMIOdarone    Tablet 400 milliGRAM(s) Oral every 8 hours  aspirin  chewable 81 milliGRAM(s) Oral daily  atorvastatin 80 milliGRAM(s) Oral at bedtime  budesonide  80 MICROgram(s)/formoterol 4.5 MICROgram(s) Inhaler 2 Puff(s) Inhalation two times a day  glucagon  Injectable 1 milliGRAM(s) IntraMuscular once  heparin  Infusion 1600 Unit(s)/Hr (16 mL/Hr) IV Continuous <Continuous>  hydrALAZINE 100 milliGRAM(s) Oral three times a day  insulin glargine Injectable (LANTUS) 8 Unit(s) SubCutaneous at bedtime  insulin lispro (ADMELOG) corrective regimen sliding scale   SubCutaneous three times a day before meals  insulin lispro Injectable (ADMELOG) 8 Unit(s) SubCutaneous three times a day before meals  isosorbide   dinitrate Tablet (ISORDIL) 40 milliGRAM(s) Oral three times a day  lidocaine   4% Patch 1 Patch Transdermal every 24 hours  lidocaine   Infusion 0.5 mG/Min (3.75 mL/Hr) IV Continuous <Continuous>  pantoprazole  Injectable 40 milliGRAM(s) IV Push daily  spironolactone 25 milliGRAM(s) Oral daily  ticagrelor 90 milliGRAM(s) Oral every 12 hours  tiotropium 2.5 MICROgram(s) Inhaler 2 Puff(s) Inhalation daily    MEDICATIONS  (PRN):  albuterol/ipratropium for Nebulization 3 milliLiter(s) Nebulizer every 6 hours PRN Shortness of Breath and/or Wheezing  guaiFENesin Oral Liquid (Sugar-Free) 100 milliGRAM(s) Oral every 6 hours PRN Cough  LORazepam     Tablet 0.5 milliGRAM(s) Oral every 6 hours PRN Anxiety    PAST MEDICAL & SURGICAL HISTORY:  HTN (hypertension)  CAD (coronary artery disease) 2009; stent  Myocardial infarction, unspecified MI type, unspecified artery  History of coronary artery stent placement    FAMILY HISTORY:  Family history of meningitis (Sibling)  Brother, death at age 49 from complications of infection (June 2015)  Family history of hypertension in mother    Allergy   penicillins (Unknown)    ICU Vital Signs Last 24 Hrs  T(C): 37.6 (Max: 37.6)  HR: 93  (85 - 93)  ABP: 125/85 (105/58 - 127/69)  ABP(mean): 106 (84 - 199)  RR: 36  (16 - 36)  SpO2: 94%  (92% - 97%) on room air     I&O's Summary  IN: 1337.7 mL / OUT: 2795 mL / NET: -1457.3 mL    PHYSICAL EXAM  Appearance: Normal, NAD  HEAD:  Normocephalic  EYES: PERRLA, conjunctiva and sclera clear  NECK: Supple, No JVD  CHEST/LUNG: Clear to auscultation bilaterally; No wheezing  HEART: Normal S1, S2. No murmurs, rubs, or gallops  ABDOMEN: + Bowel sounds, Soft, NT, ND   EXTREMITIES:  2+ Peripheral Pulses, No clubbing, cyanosis, or edema  NEUROLOGY: non-focal, aaox3  SKIN: No rashes or lesions, left groin w/o bleeding or hematoma     Interpretation of Telemetry:   CXR IABP in place                         11.1 <11.5  10.92 )-----------( 240      ( 12 Nov 2023 01:08 )             33.6       x   |  x   |  x   ----------------------------<  x   x    |  x   |  4.07<H>    Ca    9.0      Phos 3.3     Mg     2.2     TPro  6.8  /  Alb  3.1<L>  /  TBili  0.9  /  DBili  x   /  AST  46<H>  /  ALT  98<H>  /  AlkPhos  65  11-12    ABG - ( 12 Nov 2023 00:45 )  pH, Arterial: 7.45   /  pCO2: 27    /  pO2: 139   / HCO3: 19    / Base Excess: -4.0  /  SaO2: 100.0   F/S 125-207

## 2023-11-12 NOTE — PROGRESS NOTE ADULT - ASSESSMENT
57 yo male h/o htn, cad s/p pci, ICH, here with NSTEMI  s/p intubation and cath. now in CCU    NSTEMI  s/p  cath  cath results noted. multi vessel dz., CTSx f/u.   hep gtt  pt with vtach/fib arrest again on 11/8. s/p ACLS and ROSC.   now extubated  IABP in place  mngt as per CCU  plan for transplant as per HF and transplant team    LV thrombus   hep gtt    acute on chronic systolic heart failure  heart failure team f/u      pna  recently diagnosed outpt  iv abx now stopped  f/u cult   id following     covid  supportive care    h/o ICH  resolved    agitation  psy consult f/u    mngt as per CCU team  d/w CCU attn    d/w pts pmd        Advanced care planning was discussed with patient and family.  Advanced care planning forms were reviewed and discussed as appropriate.  Differential diagnosis and plan of care discussed with patient after the evaluation.   Pain assessed and judicious use of narcotics when appropriate was discussed.  Importance of Fall prevention discussed.  Counseling on Smoking and Alcohol cessation was offered when appropriate.  Counseling on Diet, exercise, and medication compliance was done.       Approx 75 minutes spent.

## 2023-11-12 NOTE — PROGRESS NOTE ADULT - ASSESSMENT
58M with HTN, CAD (s/p PCI 2008), ICH 2008, HFrEF (EF severely reduced 2018) presenting with chest pressure on 11/1. Prior to cath was intubated 2/2 AHRF, s/p LHC showed pLAD 70 % stenosis in the ostial portion of the segment and 100 % in-stent restenosis, 100 % stenosis mRCA and RHC revealing elevated filling pressures and marginal perfusion indices for which IABP was inserted. He was admitted to CICU, extubated to nasal canula on 11/3, IABP weaned and discontinued 11/7. While in CICU he was diuresed and weaned off inotropes, intiated on PO GDMT, was downgraded to the floor on 11/8. Of note, he was pending cardiac viability study; however, had been deferred for CABG by CTS. On 11/8 he had witnessed seizure activity by his family and lost pulse. Code blue called for PEA arrest, telemetry reveals VT/VF. ROSC achieved after shock delivered x3, Lidocaine x1, Amio x1 and Mg x1 given. EP. Continue PO Amiodarone load and may wean Lido as tolerated.  Transplant team evaluating patient. Possible CRT-D if no immediate transplant and patient needs bridge.

## 2023-11-12 NOTE — PHYSICAL THERAPY INITIAL EVALUATION ADULT - PERTINENT HX OF CURRENT PROBLEM, REHAB EVAL
57yo M w/ hx HTN, CAD w/ 1 stent in 2009, ICH (2008) presenting with abn ekg. Patient presented to [facility] where he was found to have STEMI, recommended to get cath however patient did not want to get it there so it left and came here.  Patient initially had cough, congestion, fever, was placed on antibiotics on Sunday.  Started feeling nauseous and had a presyncopal event after which he presented to ED last night. Had chest pain as well. Found to have in-stent restenosis of pLAD and  of RCA with elevated RA and PA pressures and severely decreased EF 32%, admitted to CICU for management of cardiogenic shock requiring IABP 11/1 -11/7, with hospital course c/b vfib arrest.   -11/8 VF arrest 11/8 after being downgraded   - IABP now replaced on 11/9 to limit ectopy and dec myocardial work

## 2023-11-12 NOTE — PROGRESS NOTE ADULT - ASSESSMENT
59 yo M with PMHx of HTN, CAD w/ 1 stent in 2009, ICH (2008) presented on 11/1 with abn ekg. Patient presented to [facility] where he was found to have STEMI, recommended to get cath however patient did not want to get it there so he left and came here.   EKG here with ST depression in lateral leads and elevation in anterior leads   Prior to C found to be tachycardic, dyspneic, intubated   C 11/1 with chronic total occlusion of LAD and RCA, with elevated RA and PA pressures  TTE 11/1 with severely decreased EF 32%, s/p IABP 11/1    RVP (11/1) Positive for COVID19  RVP (11/10) Negative  BCx (11/2, 11/4) NGTD  ETT Culture (11/2) NGTD  MRSA/MSSA PCR (11/2, 11/10) Negative  UA (11/10) 1 WBC    CXR (11/10) Clear Lungs  TTE (11/2) Left ventricular thrombus.    Antibiotic Course:  Vancomycin: 11/2  Cefepime: 11/2    #Pre-Heart Transplant Evaluation  --ID Clearance for OHT pending quantiferon gold, syphilis screen and preliminary blood cultures   --Recommend COVID19 Nucleocapsid Antibody  --Recommend COVID19 [place] Antibody  --Recommend HAV IgG  --Recommend HBVs Ab, HBVsAg, HBVc Ab  --Recommend HCV Ab  --Recommend HSV 1/2 IgG  --Recommend EBV Serology  --Recommend CMV IgG  --Recommend VZV IgG  --Recommend Measles, Mumps and Rubella IgG serologies  --Recommend Quantiferon Gold  --Recommend HIV Test  --Recommend Syphilis Screen  --Recommend Toxoplasma IgG  --Recommend Strongyloides Ab  --Recommend Trypanosoma cruzii serology    #Fever, Leukocytosis improved  Favor noninfectious etiology of fever such as LV thrombus  Elevated procalcitonin noted but this is in context of ARIC  --Monitor off of antibiotics  --If clinical status changes would start Vancomycin + Cefepime  --Follow up on preliminary blood cultures are negative to date    #COVID19  RVP (11/1) Positive for COVID19  RVP (11/10) Negative  Did not receive therapy for COVID19  Low suspicion that hypoxia currently is secondary to COVID19 (favor cardiogenic causes)  --Complete isolation per infection control protocol        Chao Peters MD  Can be called via Teams  After 5pm/weekends [phone]

## 2023-11-13 DIAGNOSIS — N17.9 ACUTE KIDNEY FAILURE, UNSPECIFIED: ICD-10-CM

## 2023-11-13 LAB
ALBUMIN SERPL ELPH-MCNC: 3.3 G/DL — SIGNIFICANT CHANGE UP (ref 3.3–5)
ALBUMIN SERPL ELPH-MCNC: 3.3 G/DL — SIGNIFICANT CHANGE UP (ref 3.3–5)
ALP SERPL-CCNC: 63 U/L — SIGNIFICANT CHANGE UP (ref 40–120)
ALP SERPL-CCNC: 63 U/L — SIGNIFICANT CHANGE UP (ref 40–120)
ALT FLD-CCNC: 92 U/L — HIGH (ref 10–45)
ALT FLD-CCNC: 92 U/L — HIGH (ref 10–45)
ANA TITR SER: NEGATIVE — SIGNIFICANT CHANGE UP
ANA TITR SER: NEGATIVE — SIGNIFICANT CHANGE UP
ANION GAP SERPL CALC-SCNC: 15 MMOL/L — SIGNIFICANT CHANGE UP (ref 5–17)
ANION GAP SERPL CALC-SCNC: 15 MMOL/L — SIGNIFICANT CHANGE UP (ref 5–17)
APTT BLD: 82.2 SEC — HIGH (ref 24.5–35.6)
APTT BLD: 82.2 SEC — HIGH (ref 24.5–35.6)
APTT BLD: 84.8 SEC — HIGH (ref 24.5–35.6)
APTT BLD: 84.8 SEC — HIGH (ref 24.5–35.6)
APTT BLD: 90.4 SEC — HIGH (ref 24.5–35.6)
APTT BLD: 90.4 SEC — HIGH (ref 24.5–35.6)
AST SERPL-CCNC: 45 U/L — HIGH (ref 10–40)
AST SERPL-CCNC: 45 U/L — HIGH (ref 10–40)
BASE EXCESS BLDMV CALC-SCNC: -3.6 MMOL/L — LOW (ref -3–3)
BASE EXCESS BLDMV CALC-SCNC: -3.6 MMOL/L — LOW (ref -3–3)
BASE EXCESS BLDV CALC-SCNC: -3.5 MMOL/L — LOW (ref -2–3)
BASE EXCESS BLDV CALC-SCNC: -3.5 MMOL/L — LOW (ref -2–3)
BASOPHILS # BLD AUTO: 0.05 K/UL — SIGNIFICANT CHANGE UP (ref 0–0.2)
BASOPHILS # BLD AUTO: 0.05 K/UL — SIGNIFICANT CHANGE UP (ref 0–0.2)
BASOPHILS NFR BLD AUTO: 0.5 % — SIGNIFICANT CHANGE UP (ref 0–2)
BASOPHILS NFR BLD AUTO: 0.5 % — SIGNIFICANT CHANGE UP (ref 0–2)
BILIRUB SERPL-MCNC: 0.8 MG/DL — SIGNIFICANT CHANGE UP (ref 0.2–1.2)
BILIRUB SERPL-MCNC: 0.8 MG/DL — SIGNIFICANT CHANGE UP (ref 0.2–1.2)
BLD GP AB SCN SERPL QL: NEGATIVE — SIGNIFICANT CHANGE UP
BLD GP AB SCN SERPL QL: NEGATIVE — SIGNIFICANT CHANGE UP
BUN SERPL-MCNC: 35 MG/DL — HIGH (ref 7–23)
BUN SERPL-MCNC: 35 MG/DL — HIGH (ref 7–23)
CA-I SERPL-SCNC: 1.25 MMOL/L — SIGNIFICANT CHANGE UP (ref 1.15–1.33)
CA-I SERPL-SCNC: 1.25 MMOL/L — SIGNIFICANT CHANGE UP (ref 1.15–1.33)
CALCIUM SERPL-MCNC: 9.6 MG/DL — SIGNIFICANT CHANGE UP (ref 8.4–10.5)
CALCIUM SERPL-MCNC: 9.6 MG/DL — SIGNIFICANT CHANGE UP (ref 8.4–10.5)
CHLORIDE BLDV-SCNC: 106 MMOL/L — SIGNIFICANT CHANGE UP (ref 96–108)
CHLORIDE BLDV-SCNC: 106 MMOL/L — SIGNIFICANT CHANGE UP (ref 96–108)
CHLORIDE SERPL-SCNC: 105 MMOL/L — SIGNIFICANT CHANGE UP (ref 96–108)
CHLORIDE SERPL-SCNC: 105 MMOL/L — SIGNIFICANT CHANGE UP (ref 96–108)
CO2 BLDMV-SCNC: 21 MMOL/L — SIGNIFICANT CHANGE UP (ref 21–29)
CO2 BLDMV-SCNC: 21 MMOL/L — SIGNIFICANT CHANGE UP (ref 21–29)
CO2 BLDV-SCNC: 21 MMOL/L — LOW (ref 22–26)
CO2 BLDV-SCNC: 21 MMOL/L — LOW (ref 22–26)
CO2 SERPL-SCNC: 17 MMOL/L — LOW (ref 22–31)
CO2 SERPL-SCNC: 17 MMOL/L — LOW (ref 22–31)
CREAT SERPL-MCNC: 2.04 MG/DL — HIGH (ref 0.5–1.3)
CREAT SERPL-MCNC: 2.04 MG/DL — HIGH (ref 0.5–1.3)
EBV DNA SERPL NAA+PROBE-ACNC: SIGNIFICANT CHANGE UP IU/ML
EBV DNA SERPL NAA+PROBE-ACNC: SIGNIFICANT CHANGE UP IU/ML
EBVPCR LOG: SIGNIFICANT CHANGE UP LOG10IU/ML
EBVPCR LOG: SIGNIFICANT CHANGE UP LOG10IU/ML
EGFR: 37 ML/MIN/1.73M2 — LOW
EGFR: 37 ML/MIN/1.73M2 — LOW
EOSINOPHIL # BLD AUTO: 0.25 K/UL — SIGNIFICANT CHANGE UP (ref 0–0.5)
EOSINOPHIL # BLD AUTO: 0.25 K/UL — SIGNIFICANT CHANGE UP (ref 0–0.5)
EOSINOPHIL NFR BLD AUTO: 2.3 % — SIGNIFICANT CHANGE UP (ref 0–6)
EOSINOPHIL NFR BLD AUTO: 2.3 % — SIGNIFICANT CHANGE UP (ref 0–6)
GAS PNL BLDA: SIGNIFICANT CHANGE UP
GAS PNL BLDA: SIGNIFICANT CHANGE UP
GAS PNL BLDMV: SIGNIFICANT CHANGE UP
GAS PNL BLDMV: SIGNIFICANT CHANGE UP
GAS PNL BLDV: 134 MMOL/L — LOW (ref 136–145)
GAS PNL BLDV: 134 MMOL/L — LOW (ref 136–145)
GAS PNL BLDV: SIGNIFICANT CHANGE UP
GAS PNL BLDV: SIGNIFICANT CHANGE UP
GLUCOSE BLDC GLUCOMTR-MCNC: 124 MG/DL — HIGH (ref 70–99)
GLUCOSE BLDC GLUCOMTR-MCNC: 124 MG/DL — HIGH (ref 70–99)
GLUCOSE BLDC GLUCOMTR-MCNC: 143 MG/DL — HIGH (ref 70–99)
GLUCOSE BLDC GLUCOMTR-MCNC: 143 MG/DL — HIGH (ref 70–99)
GLUCOSE BLDC GLUCOMTR-MCNC: 167 MG/DL — HIGH (ref 70–99)
GLUCOSE BLDC GLUCOMTR-MCNC: 167 MG/DL — HIGH (ref 70–99)
GLUCOSE BLDC GLUCOMTR-MCNC: 175 MG/DL — HIGH (ref 70–99)
GLUCOSE BLDC GLUCOMTR-MCNC: 175 MG/DL — HIGH (ref 70–99)
GLUCOSE BLDV-MCNC: 138 MG/DL — HIGH (ref 70–99)
GLUCOSE BLDV-MCNC: 138 MG/DL — HIGH (ref 70–99)
GLUCOSE SERPL-MCNC: 138 MG/DL — HIGH (ref 70–99)
GLUCOSE SERPL-MCNC: 138 MG/DL — HIGH (ref 70–99)
HBV DNA # SERPL NAA+PROBE: SIGNIFICANT CHANGE UP
HBV DNA # SERPL NAA+PROBE: SIGNIFICANT CHANGE UP
HBV DNA SERPL NAA+PROBE-LOG#: SIGNIFICANT CHANGE UP LOGIU/ML
HBV DNA SERPL NAA+PROBE-LOG#: SIGNIFICANT CHANGE UP LOGIU/ML
HCO3 BLDMV-SCNC: 20 MMOL/L — SIGNIFICANT CHANGE UP (ref 20–28)
HCO3 BLDMV-SCNC: 20 MMOL/L — SIGNIFICANT CHANGE UP (ref 20–28)
HCO3 BLDV-SCNC: 20 MMOL/L — LOW (ref 22–29)
HCO3 BLDV-SCNC: 20 MMOL/L — LOW (ref 22–29)
HCT VFR BLD CALC: 37 % — LOW (ref 39–50)
HCT VFR BLD CALC: 37 % — LOW (ref 39–50)
HCT VFR BLDA CALC: 37 % — LOW (ref 39–51)
HCT VFR BLDA CALC: 37 % — LOW (ref 39–51)
HGB BLD CALC-MCNC: 12.3 G/DL — LOW (ref 12.6–17.4)
HGB BLD CALC-MCNC: 12.3 G/DL — LOW (ref 12.6–17.4)
HGB BLD-MCNC: 12.1 G/DL — LOW (ref 13–17)
HGB BLD-MCNC: 12.1 G/DL — LOW (ref 13–17)
HOROWITZ INDEX BLDMV+IHG-RTO: 21 — SIGNIFICANT CHANGE UP
HOROWITZ INDEX BLDMV+IHG-RTO: 21 — SIGNIFICANT CHANGE UP
HOROWITZ INDEX BLDV+IHG-RTO: 21 — SIGNIFICANT CHANGE UP
HOROWITZ INDEX BLDV+IHG-RTO: 21 — SIGNIFICANT CHANGE UP
IGA FLD-MCNC: 395 MG/DL — SIGNIFICANT CHANGE UP (ref 84–499)
IGA FLD-MCNC: 395 MG/DL — SIGNIFICANT CHANGE UP (ref 84–499)
IGG FLD-MCNC: 1228 MG/DL — SIGNIFICANT CHANGE UP (ref 610–1660)
IGG FLD-MCNC: 1228 MG/DL — SIGNIFICANT CHANGE UP (ref 610–1660)
IGM SERPL-MCNC: 116 MG/DL — SIGNIFICANT CHANGE UP (ref 35–242)
IGM SERPL-MCNC: 116 MG/DL — SIGNIFICANT CHANGE UP (ref 35–242)
IMM GRANULOCYTES NFR BLD AUTO: 0.5 % — SIGNIFICANT CHANGE UP (ref 0–0.9)
IMM GRANULOCYTES NFR BLD AUTO: 0.5 % — SIGNIFICANT CHANGE UP (ref 0–0.9)
INR BLD: 1.26 RATIO — HIGH (ref 0.85–1.18)
INR BLD: 1.26 RATIO — HIGH (ref 0.85–1.18)
INR BLD: 1.39 RATIO — HIGH (ref 0.85–1.18)
INR BLD: 1.39 RATIO — HIGH (ref 0.85–1.18)
KAPPA LC SER QL IFE: 6.52 MG/DL — HIGH (ref 0.33–1.94)
KAPPA LC SER QL IFE: 6.52 MG/DL — HIGH (ref 0.33–1.94)
KAPPA/LAMBDA FREE LIGHT CHAIN RATIO, SERUM: 1.08 RATIO — SIGNIFICANT CHANGE UP (ref 0.26–1.65)
KAPPA/LAMBDA FREE LIGHT CHAIN RATIO, SERUM: 1.08 RATIO — SIGNIFICANT CHANGE UP (ref 0.26–1.65)
LACTATE BLDV-MCNC: 1.2 MMOL/L — SIGNIFICANT CHANGE UP (ref 0.5–2)
LACTATE BLDV-MCNC: 1.2 MMOL/L — SIGNIFICANT CHANGE UP (ref 0.5–2)
LAMBDA LC SER QL IFE: 6.03 MG/DL — HIGH (ref 0.57–2.63)
LAMBDA LC SER QL IFE: 6.03 MG/DL — HIGH (ref 0.57–2.63)
LYMPHOCYTES # BLD AUTO: 1.35 K/UL — SIGNIFICANT CHANGE UP (ref 1–3.3)
LYMPHOCYTES # BLD AUTO: 1.35 K/UL — SIGNIFICANT CHANGE UP (ref 1–3.3)
LYMPHOCYTES # BLD AUTO: 12.3 % — LOW (ref 13–44)
LYMPHOCYTES # BLD AUTO: 12.3 % — LOW (ref 13–44)
MAGNESIUM SERPL-MCNC: 2.2 MG/DL — SIGNIFICANT CHANGE UP (ref 1.6–2.6)
MAGNESIUM SERPL-MCNC: 2.2 MG/DL — SIGNIFICANT CHANGE UP (ref 1.6–2.6)
MCHC RBC-ENTMCNC: 28.6 PG — SIGNIFICANT CHANGE UP (ref 27–34)
MCHC RBC-ENTMCNC: 28.6 PG — SIGNIFICANT CHANGE UP (ref 27–34)
MCHC RBC-ENTMCNC: 32.7 GM/DL — SIGNIFICANT CHANGE UP (ref 32–36)
MCHC RBC-ENTMCNC: 32.7 GM/DL — SIGNIFICANT CHANGE UP (ref 32–36)
MCV RBC AUTO: 87.5 FL — SIGNIFICANT CHANGE UP (ref 80–100)
MCV RBC AUTO: 87.5 FL — SIGNIFICANT CHANGE UP (ref 80–100)
MONOCYTES # BLD AUTO: 0.96 K/UL — HIGH (ref 0–0.9)
MONOCYTES # BLD AUTO: 0.96 K/UL — HIGH (ref 0–0.9)
MONOCYTES NFR BLD AUTO: 8.8 % — SIGNIFICANT CHANGE UP (ref 2–14)
MONOCYTES NFR BLD AUTO: 8.8 % — SIGNIFICANT CHANGE UP (ref 2–14)
NEUTROPHILS # BLD AUTO: 8.3 K/UL — HIGH (ref 1.8–7.4)
NEUTROPHILS # BLD AUTO: 8.3 K/UL — HIGH (ref 1.8–7.4)
NEUTROPHILS NFR BLD AUTO: 75.6 % — SIGNIFICANT CHANGE UP (ref 43–77)
NEUTROPHILS NFR BLD AUTO: 75.6 % — SIGNIFICANT CHANGE UP (ref 43–77)
NRBC # BLD: 0 /100 WBCS — SIGNIFICANT CHANGE UP (ref 0–0)
NRBC # BLD: 0 /100 WBCS — SIGNIFICANT CHANGE UP (ref 0–0)
O2 CT VFR BLD CALC: 47 MMHG — SIGNIFICANT CHANGE UP (ref 30–65)
O2 CT VFR BLD CALC: 47 MMHG — SIGNIFICANT CHANGE UP (ref 30–65)
PCO2 BLDMV: 33 MMHG — SIGNIFICANT CHANGE UP (ref 30–65)
PCO2 BLDMV: 33 MMHG — SIGNIFICANT CHANGE UP (ref 30–65)
PCO2 BLDV: 32 MMHG — LOW (ref 42–55)
PCO2 BLDV: 32 MMHG — LOW (ref 42–55)
PH BLDMV: 7.4 — SIGNIFICANT CHANGE UP (ref 7.32–7.45)
PH BLDMV: 7.4 — SIGNIFICANT CHANGE UP (ref 7.32–7.45)
PH BLDV: 7.41 — SIGNIFICANT CHANGE UP (ref 7.32–7.43)
PH BLDV: 7.41 — SIGNIFICANT CHANGE UP (ref 7.32–7.43)
PHOSPHATE SERPL-MCNC: 2.9 MG/DL — SIGNIFICANT CHANGE UP (ref 2.5–4.5)
PHOSPHATE SERPL-MCNC: 2.9 MG/DL — SIGNIFICANT CHANGE UP (ref 2.5–4.5)
PLATELET # BLD AUTO: 247 K/UL — SIGNIFICANT CHANGE UP (ref 150–400)
PLATELET # BLD AUTO: 247 K/UL — SIGNIFICANT CHANGE UP (ref 150–400)
PO2 BLDV: 62 MMHG — HIGH (ref 25–45)
PO2 BLDV: 62 MMHG — HIGH (ref 25–45)
POTASSIUM BLDV-SCNC: 4.2 MMOL/L — SIGNIFICANT CHANGE UP (ref 3.5–5.1)
POTASSIUM BLDV-SCNC: 4.2 MMOL/L — SIGNIFICANT CHANGE UP (ref 3.5–5.1)
POTASSIUM SERPL-MCNC: 4.3 MMOL/L — SIGNIFICANT CHANGE UP (ref 3.5–5.3)
POTASSIUM SERPL-MCNC: 4.3 MMOL/L — SIGNIFICANT CHANGE UP (ref 3.5–5.3)
POTASSIUM SERPL-SCNC: 4.3 MMOL/L — SIGNIFICANT CHANGE UP (ref 3.5–5.3)
POTASSIUM SERPL-SCNC: 4.3 MMOL/L — SIGNIFICANT CHANGE UP (ref 3.5–5.3)
PROT SERPL-MCNC: 7.5 G/DL — SIGNIFICANT CHANGE UP (ref 6–8.3)
PROT SERPL-MCNC: 7.5 G/DL — SIGNIFICANT CHANGE UP (ref 6–8.3)
PROTHROM AB SERPL-ACNC: 13.8 SEC — HIGH (ref 9.5–13)
PROTHROM AB SERPL-ACNC: 13.8 SEC — HIGH (ref 9.5–13)
PROTHROM AB SERPL-ACNC: 14.5 SEC — HIGH (ref 9.5–13)
PROTHROM AB SERPL-ACNC: 14.5 SEC — HIGH (ref 9.5–13)
RBC # BLD: 4.23 M/UL — SIGNIFICANT CHANGE UP (ref 4.2–5.8)
RBC # BLD: 4.23 M/UL — SIGNIFICANT CHANGE UP (ref 4.2–5.8)
RBC # FLD: 14.5 % — SIGNIFICANT CHANGE UP (ref 10.3–14.5)
RBC # FLD: 14.5 % — SIGNIFICANT CHANGE UP (ref 10.3–14.5)
RH IG SCN BLD-IMP: POSITIVE — SIGNIFICANT CHANGE UP
RH IG SCN BLD-IMP: POSITIVE — SIGNIFICANT CHANGE UP
SAO2 % BLDMV: 74.3 — SIGNIFICANT CHANGE UP (ref 60–90)
SAO2 % BLDMV: 74.3 — SIGNIFICANT CHANGE UP (ref 60–90)
SAO2 % BLDV: 90.8 % — HIGH (ref 67–88)
SAO2 % BLDV: 90.8 % — HIGH (ref 67–88)
SODIUM SERPL-SCNC: 137 MMOL/L — SIGNIFICANT CHANGE UP (ref 135–145)
SODIUM SERPL-SCNC: 137 MMOL/L — SIGNIFICANT CHANGE UP (ref 135–145)
STRONGYLOIDES AB SER-ACNC: NEGATIVE — SIGNIFICANT CHANGE UP
STRONGYLOIDES AB SER-ACNC: NEGATIVE — SIGNIFICANT CHANGE UP
WBC # BLD: 10.96 K/UL — HIGH (ref 3.8–10.5)
WBC # BLD: 10.96 K/UL — HIGH (ref 3.8–10.5)
WBC # FLD AUTO: 10.96 K/UL — HIGH (ref 3.8–10.5)
WBC # FLD AUTO: 10.96 K/UL — HIGH (ref 3.8–10.5)

## 2023-11-13 PROCEDURE — 99292 CRITICAL CARE ADDL 30 MIN: CPT

## 2023-11-13 PROCEDURE — 99222 1ST HOSP IP/OBS MODERATE 55: CPT

## 2023-11-13 PROCEDURE — 99233 SBSQ HOSP IP/OBS HIGH 50: CPT

## 2023-11-13 PROCEDURE — 93010 ELECTROCARDIOGRAM REPORT: CPT

## 2023-11-13 PROCEDURE — 99291 CRITICAL CARE FIRST HOUR: CPT

## 2023-11-13 PROCEDURE — 94010 BREATHING CAPACITY TEST: CPT | Mod: 26

## 2023-11-13 PROCEDURE — 71045 X-RAY EXAM CHEST 1 VIEW: CPT | Mod: 26

## 2023-11-13 RX ORDER — ACETAMINOPHEN 500 MG
1000 TABLET ORAL ONCE
Refills: 0 | Status: COMPLETED | OUTPATIENT
Start: 2023-11-13 | End: 2023-11-13

## 2023-11-13 RX ADMIN — HEPARIN SODIUM 14 UNIT(S)/HR: 5000 INJECTION INTRAVENOUS; SUBCUTANEOUS at 13:54

## 2023-11-13 RX ADMIN — Medication 1000 MILLIGRAM(S): at 20:00

## 2023-11-13 RX ADMIN — HEPARIN SODIUM 15.5 UNIT(S)/HR: 5000 INJECTION INTRAVENOUS; SUBCUTANEOUS at 07:49

## 2023-11-13 RX ADMIN — ISOSORBIDE DINITRATE 40 MILLIGRAM(S): 5 TABLET ORAL at 06:31

## 2023-11-13 RX ADMIN — ISOSORBIDE DINITRATE 40 MILLIGRAM(S): 5 TABLET ORAL at 17:20

## 2023-11-13 RX ADMIN — PANTOPRAZOLE SODIUM 40 MILLIGRAM(S): 20 TABLET, DELAYED RELEASE ORAL at 11:35

## 2023-11-13 RX ADMIN — Medication 3.75 MG/MIN: at 07:49

## 2023-11-13 RX ADMIN — ATORVASTATIN CALCIUM 80 MILLIGRAM(S): 80 TABLET, FILM COATED ORAL at 22:25

## 2023-11-13 RX ADMIN — Medication 8 UNIT(S): at 08:44

## 2023-11-13 RX ADMIN — Medication 100 MILLIGRAM(S): at 06:32

## 2023-11-13 RX ADMIN — INSULIN GLARGINE 8 UNIT(S): 100 INJECTION, SOLUTION SUBCUTANEOUS at 22:26

## 2023-11-13 RX ADMIN — Medication 8 UNIT(S): at 12:26

## 2023-11-13 RX ADMIN — AMIODARONE HYDROCHLORIDE 400 MILLIGRAM(S): 400 TABLET ORAL at 13:53

## 2023-11-13 RX ADMIN — Medication 0.5 MILLIGRAM(S): at 06:53

## 2023-11-13 RX ADMIN — Medication 8 UNIT(S): at 17:21

## 2023-11-13 RX ADMIN — Medication 0.5 MILLIGRAM(S): at 15:10

## 2023-11-13 RX ADMIN — AMIODARONE HYDROCHLORIDE 400 MILLIGRAM(S): 400 TABLET ORAL at 06:31

## 2023-11-13 RX ADMIN — CHLORHEXIDINE GLUCONATE 1 APPLICATION(S): 213 SOLUTION TOPICAL at 05:25

## 2023-11-13 RX ADMIN — Medication 100 MILLIGRAM(S): at 13:53

## 2023-11-13 RX ADMIN — ISOSORBIDE DINITRATE 40 MILLIGRAM(S): 5 TABLET ORAL at 11:35

## 2023-11-13 RX ADMIN — TICAGRELOR 90 MILLIGRAM(S): 90 TABLET ORAL at 06:32

## 2023-11-13 RX ADMIN — Medication 400 MILLIGRAM(S): at 19:21

## 2023-11-13 RX ADMIN — Medication 1000 MILLIGRAM(S): at 10:11

## 2023-11-13 RX ADMIN — BUDESONIDE AND FORMOTEROL FUMARATE DIHYDRATE 2 PUFF(S): 160; 4.5 AEROSOL RESPIRATORY (INHALATION) at 08:31

## 2023-11-13 RX ADMIN — SODIUM CHLORIDE 4 MILLILITER(S): 9 INJECTION INTRAMUSCULAR; INTRAVENOUS; SUBCUTANEOUS at 08:31

## 2023-11-13 RX ADMIN — SPIRONOLACTONE 25 MILLIGRAM(S): 25 TABLET, FILM COATED ORAL at 06:30

## 2023-11-13 RX ADMIN — AMIODARONE HYDROCHLORIDE 400 MILLIGRAM(S): 400 TABLET ORAL at 22:25

## 2023-11-13 RX ADMIN — HEPARIN SODIUM 15.5 UNIT(S)/HR: 5000 INJECTION INTRAVENOUS; SUBCUTANEOUS at 06:00

## 2023-11-13 RX ADMIN — Medication 100 MILLIGRAM(S): at 22:25

## 2023-11-13 RX ADMIN — LIDOCAINE 1 PATCH: 4 CREAM TOPICAL at 00:25

## 2023-11-13 RX ADMIN — Medication 400 MILLIGRAM(S): at 09:15

## 2023-11-13 RX ADMIN — HEPARIN SODIUM 13 UNIT(S)/HR: 5000 INJECTION INTRAVENOUS; SUBCUTANEOUS at 22:24

## 2023-11-13 RX ADMIN — Medication 81 MILLIGRAM(S): at 11:35

## 2023-11-13 RX ADMIN — Medication 1: at 12:27

## 2023-11-13 NOTE — PROGRESS NOTE ADULT - PROBLEM SELECTOR PLAN 1
- IABP at 1:1. On AC with Heparin infusion (also for LV thrombus)  - Can remove [place] to prevent risk of nosocomial infection   - Continue HDZN 100 mg TID and ISDN 40 mg TID, hold for SBP <90  - Continue Spironolactone 25 mg QD  - PRN diuretics to target net even fluid balance  - AT evaluation: launched 11/10. Pending US Elastography  - Please keep primary Dr. Banuelos [phone] in the loop per family request.

## 2023-11-13 NOTE — PROGRESS NOTE ADULT - SUBJECTIVE AND OBJECTIVE BOX
LD VALENCIA  58y Male  MRN:[id number]    Patient is a 58y old  Male who presents with a chief complaint of NSTEMI (01 Nov 2023 20:29)    HPI:  57yo M w/ hx HTN, CAD w/ 1 stent in 2009, ICH (2008) presenting with abn ekg. Patient presented to [facility] where he was found to have STEMI, recommended to get cath however patient did not want to get it there so it left and came here.  Patient initially had cough, congestion, fever, was placed on antibiotics on Sunday.  Started feeling nauseous and had a presyncopal event after which he presented to ED last night.  Had chest pain as well.  Chest pain is midsternal.  Not currently having chest pain.  Received 4 aspirin 30 min pta. (01 Nov 2023 15:11)      Patient seen and evaluated at bedside in CCU. interval events noted    Interval HPI: remain in ccu. IABP in place     PAST MEDICAL & SURGICAL HISTORY:  HTN (hypertension)      CAD (coronary artery disease)  2009; stent      Intracranial hemorrhage  2008      Respiratory arrest  december 1st      Myocardial infarction, unspecified MI type, unspecified artery      History of coronary artery stent placement          REVIEW OF SYSTEMS:  as per hpi     VITALS:   ICU Vital Signs Last 24 Hrs  T(C): 37.5 (13 Nov 2023 11:00), Max: 37.6 (13 Nov 2023 07:00)  T(F): 99.5 (13 Nov 2023 11:00), Max: 99.7 (13 Nov 2023 07:00)  HR: 96 (13 Nov 2023 11:00) (81 - 101)  BP: --  BP(mean): --  ABP: 112/56 (13 Nov 2023 11:00) (105/64 - 136/61)  ABP(mean): 88 (13 Nov 2023 11:00) (86 - 110)  RR: 22 (13 Nov 2023 11:00) (16 - 32)  SpO2: 94% (13 Nov 2023 11:00) (92% - 97%)    O2 Parameters below as of 13 Nov 2023 11:00  Patient On (Oxygen Delivery Method): room air              PHYSICAL EXAM:  GENERAL: NAD, comfortable   HEAD:  Atraumatic, Normocephalic  EYES: EOMI, PERRLA, conjunctiva and sclera clear  NECK: Supple, No JVD +central line  CHEST/LUNG: Clear to auscultation bilaterally; No wheeze  HEART: Regular rate and rhythm; No murmurs, rubs, or gallops  ABDOMEN: Soft, Nontender, Nondistended; Bowel sounds present  EXTREMITIES:  2+ Peripheral Pulses, No clubbing, cyanosis, or edema  NEUROLOGY: nonfocal  SKIN: No rashes or lesions  +iabp    Consultant(s) Notes Reviewed:  [x ] YES  [ ] NO  Care Discussed with Consultants/Other Providers [ x] YES  [ ] NO    MEDS:   MEDICATIONS  (STANDING):  aMIOdarone    Tablet 400 milliGRAM(s) Oral every 8 hours  aMIOdarone    Tablet   Oral   aspirin  chewable 81 milliGRAM(s) Oral daily  atorvastatin 80 milliGRAM(s) Oral at bedtime  benzonatate 100 milliGRAM(s) Oral once  budesonide  80 MICROgram(s)/formoterol 4.5 MICROgram(s) Inhaler 2 Puff(s) Inhalation two times a day  chlorhexidine 2% Cloths 1 Application(s) Topical daily  dextrose 50% Injectable 12.5 Gram(s) IV Push once  dextrose 50% Injectable 25 Gram(s) IV Push once  glucagon  Injectable 1 milliGRAM(s) IntraMuscular once  heparin  Infusion 1600 Unit(s)/Hr (15.5 mL/Hr) IV Continuous <Continuous>  hydrALAZINE 100 milliGRAM(s) Oral three times a day  insulin glargine Injectable (LANTUS) 8 Unit(s) SubCutaneous at bedtime  insulin lispro (ADMELOG) corrective regimen sliding scale   SubCutaneous three times a day before meals  insulin lispro Injectable (ADMELOG) 8 Unit(s) SubCutaneous three times a day before meals  isosorbide   dinitrate Tablet (ISORDIL) 40 milliGRAM(s) Oral three times a day  lidocaine   4% Patch 1 Patch Transdermal every 24 hours  pantoprazole  Injectable 40 milliGRAM(s) IV Push daily  sodium chloride 3%  Inhalation 4 milliLiter(s) Inhalation every 12 hours  spironolactone 25 milliGRAM(s) Oral daily  tiotropium 2.5 MICROgram(s) Inhaler 2 Puff(s) Inhalation daily    MEDICATIONS  (PRN):  albuterol/ipratropium for Nebulization 3 milliLiter(s) Nebulizer every 6 hours PRN Shortness of Breath and/or Wheezing  guaiFENesin Oral Liquid (Sugar-Free) 100 milliGRAM(s) Oral every 6 hours PRN Cough  LORazepam     Tablet 0.5 milliGRAM(s) Oral every 6 hours PRN Anxiety      ALLERGIES:  penicillins (Unknown)      LABS:                            12.1   10.96 )-----------( 247      ( 13 Nov 2023 04:45 )             37.0   11-13    137  |  105  |  35<H>  ----------------------------<  138<H>  4.3   |  17<L>  |  2.04<H>    Ca    9.6      13 Nov 2023 04:45  Phos  2.9     11-13  Mg     2.2     11-13    TPro  7.5  /  Alb  3.3  /  TBili  0.8  /  DBili  x   /  AST  45<H>  /  ALT  92<H>  /  AlkPhos  63  11-13       < from: Xray Chest 1 View- PORTABLE-Urgent (11.01.23 @ 07:42) >    IMPRESSION:  Clear lungs.    ---End of Report ---        < end of copied text >  < from: TTE W or WO Ultrasound Enhancing Agent (11.01.23 @ 10:23) >  _____________________________     CONCLUSIONS:      1. Left ventricular cavity is moderately dilated. Left ventricular wall thickness is normal. Left ventricular systolic function is severely decreased with an ejection fraction of 32 % by Chinchilla's method of disks. Regional wall motion abnormalities present.   2. Multiple segmental abnormalities exist. See findings.   3. There is moderate (grade 2) left ventricular diastolic dysfunction, with indeterminant filling pressure.   4. Normal right ventricular cavity size, wall thickness, and systolic function.   5. No significant valvular disease.   6. No pericardial effusion seen.   7. Compared to the transthoracic echocardiogram performed on 1/25/2017 the areas of akinesis are unchanged but there has been a decline in LV systolic function with new areas of hypokinesis.    __________________________________________________________________    < end of copied text >  < from: TTE Limited W or WO Ultrasound Enhancing Agent (11.02.23 @ 07:41) >  __________________________     CONCLUSIONS:      1. After obtaining consent, Definity ultrasound enhancing agent was given for enhanced left ventricular opacification and improved delineation of the left ventricular endocardial borders. Left ventricular systolic function is severely decreased with a calculated ejection fraction of 22 % by the Chinchilla's biplane method of disks. There is a left ventricular thrombus.   2. Findings were discussed with Litzy BOSS on 11/2/2023 at 8.49am.   3. There is a left ventricular thrombus.    _________________________________________________________________    < end of copied text >

## 2023-11-13 NOTE — CONSULT NOTE ADULT - ATTENDING COMMENTS
58 year old male with history of HTN, CAD (s/p PCI 2008), HFrEF, CVA 2018, and T2DM presenting with chest pressure and unknown tachycardia that was shocked x1, [facility] 11/1 found to have in-stent restenosis of pLAD and  of RCA with elevated RA and PA pressures and severely decreased EF 32%, admitted to CICU for management of cardiogenic shock requiring IABP 11/1 -11/7, with hospital course c/b vfib arrest and COVID+.  Hepatology consulted for heart transplant/LVAD evaluation.      Had normal LFTs in 3/2022, with plt 255 and INR 0.99. Does have thrombocytopenia during this admission, along with mild-mod elevations in transaminases, likely 2/2 critical illness (and fluctuating hemodynamics throughout this admission).  CT A/P (11/11/23) showed normal liver, no splenomegaly, no ascites.  Pt denies prior dx of liver disease or abnormal LFTs. Also denies family hx of liver disease, cirrhosis, liver cancer, autoimmune disease, CRC, or other primary GI malignancy. Denies EtOH use.    Recommendations:   - Check Abd US/doppler to evaluate patency of hepatic/portal veins  - Once pt is euvolemic, check liver elastography (please see fellow's note for recommendations on how to order this test and interpretation of test results).     Vivi Shoemaker MD  Hepatology Attending 59yo male with history of HTN, CAD (s/p PCI 2008), HFrEF, CVA 2018, and T2DM presenting with chest pressure and unknown tachycardia that was shocked x1, [facility] 11/1 found to have in-stent restenosis of pLAD and  of RCA with elevated RA and PA pressures and severely decreased EF 32%, admitted to CICU for management of cardiogenic shock requiring IABP 11/1 -11/7, with hospital course c/b vfib arrest and COVID+.  Hepatology consulted for heart transplant/LVAD evaluation.      Pt denies personal hx of liver disease or prior abnormal LFTs. Denies family hx of liver disease, cirrhosis, liver cancer, autoimmune disease, CRC, or other primary GI malignancy. He denies use of herbal supplements or EtOH.   Pt had normal LFTs in 3/2022, with plt 255 and INR 0.99.  CT A/P (11/11/23) showed normal liver, no splenomegaly, no ascites.    Current thrombocytopenia and mild-moderate elevations in liver enzymes likely 2/2 critical illness and fluctuating hemodynamics throughout this hospitalization.       Recommendations:  - Check Abd US/doppler to assess patency of hepatic and portal veins  - Once pt is euvolemic, can obtain liver elastography to assist with fibrosis staging. I agree with Dr. Nixon's plan as documented above.      Vivi Shoemaker MD  Hepatology Attending

## 2023-11-13 NOTE — PROGRESS NOTE ADULT - PROBLEM SELECTOR PLAN 2
- PMVT into VF arrest 11/8, 3 rounds with shocks  - On IV Lidocaine infusion and PO Amio load  - IABP support as above  - EP following

## 2023-11-13 NOTE — CONSULT NOTE ADULT - SUBJECTIVE AND OBJECTIVE BOX
Patient is a 58 year old Male inpatient who was admitted to [facility] for heart failure. Dental consulted for dental optimization prior to LVAD/heart transplant.     HPI:  59yo M w/ hx HTN, CAD w/ 1 stent in 2009, ICH (2008) presenting with abn ekg. Patient presented to [facility] where he was found to have STEMI, recommended to get cath however patient did not want to get it there so it left and came here.  Patient initially had cough, congestion, fever, was placed on antibiotics on Sunday.  Started feeling nauseous and had a presyncopal event after which he presented to ED last night.  Had chest pain as well.  Chest pain is midsternal.  Not currently having chest pain.  Received 4 aspirin 30 min pta.     PAST MEDICAL & SURGICAL HISTORY:  HTN (hypertension)  CAD (coronary artery disease)  2009; stent  Intracranial hemorrhage  2008  Respiratory arrest  december 1st  Myocardial infarction, unspecified MI type, unspecified artery  History of coronary artery stent placement     MEDICATIONS  (STANDING):  aMIOdarone    Tablet 400 milliGRAM(s) Oral every 8 hours  aMIOdarone    Tablet   Oral   aspirin  chewable 81 milliGRAM(s) Oral daily  atorvastatin 80 milliGRAM(s) Oral at bedtime  benzonatate 100 milliGRAM(s) Oral once  budesonide  80 MICROgram(s)/formoterol 4.5 MICROgram(s) Inhaler 2 Puff(s) Inhalation two times a day  chlorhexidine 2% Cloths 1 Application(s) Topical daily  dextrose 50% Injectable 12.5 Gram(s) IV Push once  dextrose 50% Injectable 25 Gram(s) IV Push once  glucagon  Injectable 1 milliGRAM(s) IntraMuscular once  heparin  Infusion 1600 Unit(s)/Hr (14 mL/Hr) IV Continuous <Continuous>  hydrALAZINE 100 milliGRAM(s) Oral three times a day  insulin glargine Injectable (LANTUS) 8 Unit(s) SubCutaneous at bedtime  insulin lispro (ADMELOG) corrective regimen sliding scale   SubCutaneous three times a day before meals  insulin lispro Injectable (ADMELOG) 8 Unit(s) SubCutaneous three times a day before meals  isosorbide   dinitrate Tablet (ISORDIL) 40 milliGRAM(s) Oral three times a day  lidocaine   4% Patch 1 Patch Transdermal every 24 hours  pantoprazole  Injectable 40 milliGRAM(s) IV Push daily  sodium chloride 3%  Inhalation 4 milliLiter(s) Inhalation every 12 hours  spironolactone 25 milliGRAM(s) Oral daily  tiotropium 2.5 MICROgram(s) Inhaler 2 Puff(s) Inhalation daily     MEDICATIONS  (PRN):  albuterol/ipratropium for Nebulization 3 milliLiter(s) Nebulizer every 6 hours PRN Shortness of Breath and/or Wheezing  guaiFENesin Oral Liquid (Sugar-Free) 100 milliGRAM(s) Oral every 6 hours PRN Cough  LORazepam     Tablet 0.5 milliGRAM(s) Oral every 6 hours PRN Anxiety     Allergies  penicillins (Unknown)     FAMILY HISTORY:  Family history of meningitis (Sibling)  Brother, death at age 49 from complications of infection (June 2015)     Family history of hypertension in mother  Mother     EOE:  TMJ (-) clicks                   (-) pops                   (-) Crepitus            Mandible: FROM            Facial bones and MOM: grossly intact            (-) Trismus            (-) LAD            (-) Swelling            (-) Asymmetry            (-) Palpation            (-) Dysphagia            (-) LOC    IOE:  permanent dentition: grossly intact          tongue/FOM: WNL          labial/buccal mucosa: WNL          (-) percussion          (-) palpation          (-) swelling           (-) mobility     Radiographs: Patient is unable to be transported by wheelchair to ED room for radiographs as per med team.     ASSESSMENT:  No clinical signs of acute odontogenic infection. No evidence of mobility, swelling, fistula or abscess. Dentition appears intact. However, odontogenic infection cannot be fully ruled out without dental radiographs. Patient dentally optimized for surgery based on limited bedside exam     PROCEDURE: Limited bedside intra/extraoral evaluation performed.     RECOMMENDATIONS:   1) Patient dentally optimized for surgery based on limited bedside exam  2) Dental F/U with outpatient dentist for comprehensive dental care.    Evans Robertson DDS, #[id number]

## 2023-11-13 NOTE — OCCUPATIONAL THERAPY INITIAL EVALUATION ADULT - PERTINENT HX OF CURRENT PROBLEM, REHAB EVAL
57yo M w/ hx HTN, CAD w/ 1 stent in 2009, ICH (2008) presenting with abn ekg. Patient presented to [facility] where he was found to have STEMI, recommended to get cath however patient did not want to get it there so it left and came here.  Patient initially had cough, congestion, fever, was placed on antibiotics on Sunday.  Started feeling nauseous and had a presyncopal event after which he presented to ED last night.  Had chest pain as well.  Chest pain is midsternal.  Not currently having chest pain.  Received 4 aspirin 30 min pta.  IABP 11/1 -11/7; with hospital course c/b vfib arrest; Intubated post arrest 11/9, Extubated 11/10    CT chest-Left lower lobe pneumonia. Acute nondisplaced left 3-5th rib fractures and mid-sternal fracture. Left femoral approach intra-aortic balloon pump with superior tip just distal to the aortic arch and inferior tip distal to the take-off of the celiac axis, SMA, and bilateral renal arteries, increasing risk for mesenteric/renal ischemia. Recommend repositioning.  CT head-Mild to moderate chronic microvascular changes without evidence of an acute transcortical infarction or hemorrhage.

## 2023-11-13 NOTE — PROGRESS NOTE ADULT - NS ATTEND AMEND GEN_ALL_CORE FT
57 y/o M with h/o CAD s/p remote PCI, HFrEF with LVEF 32%, HTN, DM, CVA admitted on November 1st. He was brought to OSH after having syncopal episode at home; He received a shock for rhythm to 180s with hypotension which improved with intervention. Presumably, he was found to have STEMI at OSH but the patient refused to stay for further care and left AMA as he wanted to receive care at [facility]. On presentation, his ECG showed Q waves in anterior leads. He was taken to the cath lab but required intubation for respiratory distress. Angiogram showed  of LAD and RCA. RHC showed elevated filling pressures with CI of 1.5. An IABP was placed. He was deemed not a surgical candidate. Possible consideration for percutaneous intervention pending cardiac viability. He was found to have COVID infection. His hemodynamics stabilized and IABP was removed on 11/7. On 11/8, he went into VT arrest that required CPR and defibrillation. He was stabilized on amiodarone and lidocaine and on 11/9 had the IABP placed back. This was c/b ATN with peak creatinine of 4.0, now improving. He is now extubated without any appreciable neurologic deficit. Repeat TTE showed LVEF low 20s, now with apical thrombus. RV of normal size and function. An evaluation for AT was launched on 11/10.     He was found in bed in NAD, stable hemodynamics off inotropic support. Renal function is improving.     Continue IABP support on 1:1  Heparin gtt for IABP and LV thrombus   Afterload reduction with hydralazine/ISDN   Spironolactone 25 mg daily   Diuretics as needed - He is currently euvolemic with low filling pressures   Evaluation for AT ongoing

## 2023-11-13 NOTE — PROGRESS NOTE ADULT - SUBJECTIVE AND OBJECTIVE BOX
Patient seen and examined at bedside.    Overnight Events:   - No acute events overnight  - On tele NSR 80s  - Denies palpitations, LH/dizziness has some rib pain        Current Meds:  albuterol/ipratropium for Nebulization 3 milliLiter(s) Nebulizer every 6 hours PRN  aMIOdarone    Tablet 400 milliGRAM(s) Oral every 8 hours  aMIOdarone    Tablet   Oral   aspirin  chewable 81 milliGRAM(s) Oral daily  atorvastatin 80 milliGRAM(s) Oral at bedtime  benzonatate 100 milliGRAM(s) Oral once  budesonide  80 MICROgram(s)/formoterol 4.5 MICROgram(s) Inhaler 2 Puff(s) Inhalation two times a day  chlorhexidine 2% Cloths 1 Application(s) Topical daily  dextrose 50% Injectable 12.5 Gram(s) IV Push once  dextrose 50% Injectable 25 Gram(s) IV Push once  glucagon  Injectable 1 milliGRAM(s) IntraMuscular once  guaiFENesin Oral Liquid (Sugar-Free) 100 milliGRAM(s) Oral every 6 hours PRN  heparin  Infusion 1600 Unit(s)/Hr IV Continuous <Continuous>  hydrALAZINE 100 milliGRAM(s) Oral three times a day  insulin glargine Injectable (LANTUS) 8 Unit(s) SubCutaneous at bedtime  insulin lispro (ADMELOG) corrective regimen sliding scale   SubCutaneous three times a day before meals  insulin lispro Injectable (ADMELOG) 8 Unit(s) SubCutaneous three times a day before meals  isosorbide   dinitrate Tablet (ISORDIL) 40 milliGRAM(s) Oral three times a day  lidocaine   4% Patch 1 Patch Transdermal every 24 hours  LORazepam     Tablet 0.5 milliGRAM(s) Oral every 6 hours PRN  pantoprazole  Injectable 40 milliGRAM(s) IV Push daily  sodium chloride 3%  Inhalation 4 milliLiter(s) Inhalation every 12 hours  spironolactone 25 milliGRAM(s) Oral daily  ticagrelor 90 milliGRAM(s) Oral every 12 hours  tiotropium 2.5 MICROgram(s) Inhaler 2 Puff(s) Inhalation daily      Vitals:  T(F): 99.5 (11-13), Max: 99.7 (11-13)  HR: 96 (11-13) (81 - 101)  BP: --  RR: 22 (11-13)  SpO2: 94% (11-13)  I&O's Summary    12 Nov 2023 07:01  -  13 Nov 2023 07:00  --------------------------------------------------------  IN: 834.7 mL / OUT: 1775 mL / NET: -940.3 mL    13 Nov 2023 07:01  -  13 Nov 2023 11:38  --------------------------------------------------------  IN: 317.2 mL / OUT: 100 mL / NET: 217.2 mL        Physical Exam:  Appearance: NAD  HEENT:   NC/AT	  Cardiovascular: +S1S2 RRR no m/g/r  Respiratory: CTA B/L	  Gastrointestinal:  Soft, NT.ND., + BS	  Skin: No rashes, masses or lesions  Neurologic: Non-focal, grossly  Extremities: No edema BLE +IABP  Vascular: Peripheral pulses palpable 2+ bilaterally                          12.1   10.96 )-----------( 247      ( 13 Nov 2023 04:45 )             37.0     11-13    137  |  105  |  35<H>  ----------------------------<  138<H>  4.3   |  17<L>  |  2.04<H>    Ca    9.6      13 Nov 2023 04:45  Phos  2.9     11-13  Mg     2.2     11-13    TPro  7.5  /  Alb  3.3  /  TBili  0.8  /  DBili  x   /  AST  45<H>  /  ALT  92<H>  /  AlkPhos  63  11-13    PT/INR - ( 13 Nov 2023 04:45 )   PT: 14.5 sec;   INR: 1.39 ratio         PTT - ( 13 Nov 2023 04:45 )  PTT:82.2 sec

## 2023-11-13 NOTE — PROGRESS NOTE ADULT - SUBJECTIVE AND OBJECTIVE BOX
Subjective:  - generalized chest pain with deep inspiration. No orthopnea or PND    Medications:  albuterol/ipratropium for Nebulization 3 milliLiter(s) Nebulizer every 6 hours PRN  aMIOdarone    Tablet 400 milliGRAM(s) Oral every 8 hours  aMIOdarone    Tablet   Oral   aspirin  chewable 81 milliGRAM(s) Oral daily  atorvastatin 80 milliGRAM(s) Oral at bedtime  benzonatate 100 milliGRAM(s) Oral once  budesonide  80 MICROgram(s)/formoterol 4.5 MICROgram(s) Inhaler 2 Puff(s) Inhalation two times a day  chlorhexidine 2% Cloths 1 Application(s) Topical daily  dextrose 50% Injectable 25 Gram(s) IV Push once  dextrose 50% Injectable 12.5 Gram(s) IV Push once  glucagon  Injectable 1 milliGRAM(s) IntraMuscular once  guaiFENesin Oral Liquid (Sugar-Free) 100 milliGRAM(s) Oral every 6 hours PRN  heparin  Infusion 1600 Unit(s)/Hr IV Continuous <Continuous>  hydrALAZINE 100 milliGRAM(s) Oral three times a day  insulin glargine Injectable (LANTUS) 8 Unit(s) SubCutaneous at bedtime  insulin lispro (ADMELOG) corrective regimen sliding scale   SubCutaneous three times a day before meals  insulin lispro Injectable (ADMELOG) 8 Unit(s) SubCutaneous three times a day before meals  isosorbide   dinitrate Tablet (ISORDIL) 40 milliGRAM(s) Oral three times a day  lidocaine   4% Patch 1 Patch Transdermal every 24 hours  LORazepam     Tablet 0.5 milliGRAM(s) Oral every 6 hours PRN  pantoprazole  Injectable 40 milliGRAM(s) IV Push daily  sodium chloride 3%  Inhalation 4 milliLiter(s) Inhalation every 12 hours  spironolactone 25 milliGRAM(s) Oral daily  tiotropium 2.5 MICROgram(s) Inhaler 2 Puff(s) Inhalation daily    Physical Exam:    Vitals:  Vital Signs Last 24 Hours  T(C): 37.1 (11-13-23 @ 19:00), Max: 37.6 (11-13-23 @ 07:00)  HR: 93 (11-13-23 @ 19:00) (84 - 101)  BP: 106/57 (11-13-23 @ 14:00) (106/57 - 106/57)  RR: 21 (11-13-23 @ 19:00) (19 - 32)  SpO2: 93% (11-13-23 @ 19:00) (92% - 97%)    I&O's Summary    12 Nov 2023 07:01  -  13 Nov 2023 07:00  --------------------------------------------------------  IN: 834.7 mL / OUT: 1800 mL / NET: -965.3 mL    13 Nov 2023 07:01  -  13 Nov 2023 19:38  --------------------------------------------------------  IN: 672.2 mL / OUT: 830 mL / NET: -157.8 mL    Tele: SR 90s    General: No distress. Comfortable.  HEENT: EOM intact.  Neck: Neck supple. JVP not elevated. No masses  Chest: Clear to auscultation bilaterally  CV: Normal S1 and S2. No murmurs, rub, or gallops. Radial pulses normal.  Abdomen: Soft, non-distended, non-tender  Extremities: Warm peripherally. No edema   Neurology: Alert and oriented times three. Sensation intact  Psych: Affect normal    Labs:                        12.1   10.96 )-----------( 247      ( 13 Nov 2023 04:45 )             37.0     11-13    137  |  105  |  35<H>  ----------------------------<  138<H>  4.3   |  17<L>  |  2.04<H>    Ca    9.6      13 Nov 2023 04:45  Phos  2.9     11-13  Mg     2.2     11-13    TPro  7.5  /  Alb  3.3  /  TBili  0.8  /  DBili  x   /  AST  45<H>  /  ALT  92<H>  /  AlkPhos  63  11-13    PT/INR - ( 13 Nov 2023 04:45 )   PT: 14.5 sec;   INR: 1.39 ratio         PTT - ( 13 Nov 2023 12:47 )  PTT:90.4 sec        Oxygen Saturation, Mixed: 74.3 (11-13 @ 04:35)  Oxygen Saturation, Mixed: 78.7 (11-12 @ 17:48)  Oxygen Saturation, Mixed: 78.6 (11-12 @ 00:45)  Oxygen Saturation, Mixed: 78.2 (11-11 @ 17:50)  Oxygen Saturation, Mixed: 77.1 (11-11 @ 09:58)  Oxygen Saturation, Mixed: 82.2 (11-11 @ 00:44)

## 2023-11-13 NOTE — PROGRESS NOTE ADULT - SUBJECTIVE AND OBJECTIVE BOX
LD VALENCIA  MRN-[id number]  Patient is a 58y old  Male who presents with a chief complaint of Heart failure (2023 17:36)    HPI:  pt seen and approx 1:30 pm  in [facility]    57yo M w/ hx HTN, CAD w/ 1 stent in , ICH () presenting with abn ekg. Patient presented to [facility] where he was found to have STEMI, recommended to get cath however patient did not want to get it there so it left and came here.  Patient initially had cough, congestion, fever, was placed on antibiotics on .  Started feeling nauseous and had a presyncopal event after which he presented to ED last night.  Had chest pain as well.  Chest pain is midsternal.  Not currently having chest pain.  Received 4 aspirin 30 min pta. (2023 15:11)      Hospital Course:    24 HOUR EVENTS:    REVIEW OF SYSTEMS:    CONSTITUTIONAL: No weakness, fevers or chills  EYES/ENT: No visual changes;  No vertigo or throat pain   NECK: No pain or stiffness  RESPIRATORY: No cough, wheezing, hemoptysis; No shortness of breath  CARDIOVASCULAR: No chest pain or palpitations  GASTROINTESTINAL: No abdominal or epigastric pain. No nausea, vomiting, or hematemesis; No diarrhea or constipation. No melena or hematochezia.  GENITOURINARY: No dysuria, frequency or hematuria  NEUROLOGICAL: No numbness or weakness  SKIN: No itching, rashes      ICU Vital Signs Last 24 Hrs  T(C): 37.1 (2023 19:00), Max: 37.6 (2023 07:00)  T(F): 98.8 (2023 19:00), Max: 99.7 (2023 07:00)  HR: 91 (2023 20:00) (84 - 101)  BP: 106/57 (2023 14:00) (106/57 - 106/57)  BP(mean): 74 (2023 14:00) (74 - 74)  ABP: 123/73 (2023 20:00) (110/55 - 136/61)  ABP(mean): 102 (2023 20:00) (86 - 110)  RR: 25 (2023 20:00) (19 - 32)  SpO2: 93% (2023 20:00) (92% - 97%)    O2 Parameters below as of 2023 20:00  Patient On (Oxygen Delivery Method): room air            CVP(mm Hg): 9 (23 @ 20:00) (-1 - 15)  CO: 8 (23 @ 00:00) (8 - 8)  CI: 3.7 (23 @ 00:00) (3.7 - 3.7)  PA: 45/13 (23 @ 20:00) (25/3 - 65/25)  PA(mean): 30 (23 @ 20:00) (15 - 44)  PA(direct): --  PCWP: --  LA: --  RA: --  SVR: 898 (23 @ 00:00) (898 - 898)  SVRI: 1943 (23 @ 00:00) ( - )  PVR: --  PVRI: --  I&O's Summary    2023 07:01  -  2023 07:00  --------------------------------------------------------  IN: 834.7 mL / OUT: 1800 mL / NET: -965.3 mL    2023 07:01  -  2023 20:48  --------------------------------------------------------  IN: 786.2 mL / OUT: 1030 mL / NET: -243.8 mL        CAPILLARY BLOOD GLUCOSE    CAPILLARY BLOOD GLUCOSE      POCT Blood Glucose.: 124 mg/dL (2023 16:50)      PHYSICAL EXAM:  GENERAL: No acute distress, well-developed  HEAD:  Atraumatic, Normocephalic  EYES: EOMI, PERRLA, conjunctiva and sclera clear  NECK: Supple, no lymphadenopathy, no JVD  CHEST/LUNG: CTAB; No wheezes, rales, or rhonchi  HEART: Regular rate and rhythm. Normal S1/S2. No murmurs, rubs, or gallops  ABDOMEN: Soft, non-tender, non-distended; normal bowel sounds, no organomegaly  EXTREMITIES:  2+ peripheral pulses b/l, No clubbing, cyanosis, or edema  NEUROLOGY: A&O x 3, no focal deficits  SKIN: No rashes or lesions    ============================I/O===========================   I&O's Detail    2023 07:01  -  2023 07:00  --------------------------------------------------------  IN:    Heparin: 383.5 mL    Lidocaine: 91.2 mL    Oral Fluid: 360 mL  Total IN: 834.7 mL    OUT:    Indwelling Catheter - Urethral (mL): 1800 mL  Total OUT: 1800 mL    Total NET: -965.3 mL      2023 07:01  -  2023 20:48  --------------------------------------------------------  IN:    Heparin: 191 mL    IV PiggyBack: 100 mL    Lidocaine: 15.2 mL    Oral Fluid: 480 mL  Total IN: 786.2 mL    OUT:    Indwelling Catheter - Urethral (mL): 1030 mL  Total OUT: 1030 mL    Total NET: -243.8 mL        ============================ LABS =========================                        12.1   10.96 )-----------( 247      ( 2023 04:45 )             37.0     11-13    137  |  105  |  35<H>  ----------------------------<  138<H>  4.3   |  17<L>  |  2.04<H>    Ca    9.6      2023 04:45  Phos  2.9       Mg     2.2         TPro  7.5  /  Alb  3.3  /  TBili  0.8  /  DBili  x   /  AST  45<H>  /  ALT  92<H>  /  AlkPhos  63                  LIVER FUNCTIONS - ( 2023 04:45 )  Alb: 3.3 g/dL / Pro: 7.5 g/dL / ALK PHOS: 63 U/L / ALT: 92 U/L / AST: 45 U/L / GGT: x           PT/INR - ( 2023 04:45 )   PT: 14.5 sec;   INR: 1.39 ratio         PTT - ( 2023 12:47 )  PTT:90.4 sec  ABG - ( 2023 04:35 )  pH, Arterial: 7.45  pH, Blood: x     /  pCO2: 27    /  pO2: 120   / HCO3: 19    / Base Excess: -3.9  /  SaO2: 99.4              Blood Gas Arterial, Lactate: 1.1 mmol/L (23 @ 04:35)  Blood Gas Venous - Lactate: 1.2 mmol/L (23 @ 04:35)  Blood Gas Venous - Lactate: 0.8 mmol/L (23 @ 17:48)  Blood Gas Arterial, Lactate: 0.7 mmol/L (23 @ 00:45)  Blood Gas Arterial, Lactate: 0.7 mmol/L (23 @ 17:50)  Blood Gas Arterial, Lactate: 1.1 mmol/L (23 @ 00:44)    Urinalysis Basic - ( 2023 04:45 )    Color: x / Appearance: x / SG: x / pH: x  Gluc: 138 mg/dL / Ketone: x  / Bili: x / Urobili: x   Blood: x / Protein: x / Nitrite: x   Leuk Esterase: x / RBC: x / WBC x   Sq Epi: x / Non Sq Epi: x / Bacteria: x      ======================Micro/Rad/Cardio=================  Telemtry: Reviewed   EKG: Reviewed  CXR: Reviewed  Culture: Reviewed   Echo:   Cath: Cardiac Cath Lab - Adult:   [facility]  Department of Cardiology  [address] [place] [address] [phone]  Cath Lab Report -- Comprehensive Report  Patient: LD VALENCIA  Study date: 2017  Account number: [id number]  MR number: [id number]  : 1965  Gender: Male  Race: W  Case Physician(s):  Jairon Holguin M.D.  Referring Physician:  Luc Lynn M.D.  INDICATIONS: Unstable angina - CCS4.  HISTORY: The patient has a history of coronary artery disease. The patient  hashypertension and medication-treated dyslipidemia.  PROCEDURE:  --  Left heart catheterization with ventriculography.  --  Left coronary angiography.  --  Right coronary angiography.  TECHNIQUE: The risks and alternatives of the procedures and conscious  sedation were explained to the patient and informed consent was obtained.  Cardiac catheterization performed electively.  Local anesthetic given. Right radial artery access. A 6FR PRELUDE KIT was  inserted in the vessel. Left heart catheterization. Ventriculography was  performed. A 5FR FR4.0 EXPO catheter was utilized. Left coronary artery  angiography. The vessel was injected utilizing a 5FR FL3.5 EXPO catheter.  Right coronary artery angiography. The vessel was injected utilizing a 5FR  FR4.0 EXPO catheter. RADIATION EXPOSURE: 1.1 min.  CONTRAST GIVEN: Omnipaque 55 ml.  MEDICATIONS GIVEN: Midazolam, 1 mg, IV. Fentanyl, 25 mcg, IV. Verapamil  (Isoptin, Calan, Covera), 2.5 mg, IA. Heparin, 3000 units, IA.  VENTRICLES: Global left ventricular function was moderately depressed. EF  estimated was 40 %.  CORONARY VESSELS: The coronary circulation is right dominant.  LM:   --  LM: Normal.  LAD:   --  Proximal LAD: There was a 50 % stenosis.  CX:   --  Circumflex: Normal.  RCA:   --  Mid RCA: There was a 40 % stenosis.  --  Distal RCA: There was a 50 % stenosis.  COMPLICATIONS: There were no complications.  DIAGNOSTIC RECOMMENDATIONS: The patient should continue with the present  medications.  Prepared and signed by  Jairon Holguin M.D.  Signed 2017 12:20:13  HEMODYNAMIC TABLES  Pressures:  Baseline/ Room Air  Pressures:  - HR: 78  Pressures:  - Rhythm:  Pressures:  -- Aortic Pressure (S/D/M): --/--/99  Pressures:  -- Left Ventricle (s/edp): 157/39/--  Outputs:  Baseline/ Room Air  Outputs:  -- CALCULATIONS[age]  Outputs:  -- CALCULATIONS: Body Surface Area: 2.05  Outputs:  -- CALCULATIONS: Height in cm: 175.00  Outputs:  -- CALCULATIONS: Sex: Male  Outputs:  -- CALCULATIONS: Weight in k.40 (17 @ 21:55)    ======================================================  PAST MEDICAL & SURGICAL HISTORY:  HTN (hypertension)      CAD (coronary artery disease)  ; stent      Intracranial hemorrhage        Respiratory arrest        Myocardial infarction, unspecified MI type, unspecified artery      History of coronary artery stent placement  ====================ASSESSMENT ==============  59 yo M with HTN, CAD (s/p PCI ), HFrEF, CVA , and T2DM presenting with chest pressure and unknown tachycardia that was shocked x1, [facility]  found to have in-stent restenosis of pLAD and  of RCA with elevated RA and PA pressures and severely decreased EF 32%, admitted to CICU for management of cardiogenic shock requiring IABP  -, with hospital course c/b vfib arrest.     PLAN    ====================NEURO====================  # Anxiety  - Ativan 0.5 mg PO Q 6hrs PRN   - No Seroquel or antipsychotics since vfib arrest and prolonged QTC  - Psych Eval last week, recs for continuing Ativan PRN and had recommended SSRI, but pt refused     ====================PULM====================  # Acute Hypoxemic Respiratory Failure  - s/p 2 intubations for cardiogenic pulm edema and the in setting of cardiac arrest  - Extubated 11/10  - currently on room air with spO2 mid 90s    # COVID +  - off isolation: cleared by ID today()     # Asthma (hx of respiratory failure in 2018- intubated for 20 minutes per chart review pt report)  - c/w albuterol, symbicort and spiriva  - on trelegy at home    ====================CARDIOVASCULAR====================  # Vfib arrest insetting of ischemia vs. shock  - lido@0.5   - PO Amio load dose (s/p IV amio ~1950mg lV gtt) for total of 5 grams per EP to complete   - Eventual BB when more comensated  - Keep K > 4, Mag > 2.2     # Cardiogenic shock requiring IABP (- , -)  - likely 2/2 NSTEMI and ADHF  -  LHC: pLAD 100 % in-stent restenosis & mRCA, 100 %. PCWP 30. IABP placed.  -  TTE: LV dilated. EF 32 %. Regional WMAs present, mod (grade 2) LV diastolic dysfunction  -  TTE: EF 22% and + LV thrombus   - IABP removed , vfib , IABP later reinserted that day for CS  - D/C Milrinone gtt 7:30 am   - currently on hydralazine 100 TID and ISD 40 TID: ALR, as Cr improves consider ARB initiation  - Holding diuretics at this time, PRN Bumex IVP as needed for goal CVP <10  - continue romel 25 daily for GDMT  - AT Eval, F/u HF recs    # NSTEMI- Stent re-occlusion of pLAD and 100%  of RCA  - EKG on admission w/LBBB  - DAPT: c/w ASA, brillinta   - Cont. lipitor 80  - cMR deferred given necessity of IABP  - CT sx not recommending CABG, undergoing AT eval    # LV thrombus  - c/w heparin gtt    ====================/RENAL====================  #non-oliguric ARIC   - sCr now improving. Baseline Cr: 1-1.22  - diuresis held, optimize pre-renal factors for optimal perfusion and volume status  - Trend BMP, lytes daily, replace as needed  - continue Strict I/Os, avoid nephrotoxins    ====================GI====================  #Diet  -Soft and bite sized     #GERD  - c/w home protonix    # Bowel regimen  - miralax and senna d/c'd iso loose stools- trend quantity and quality of BMs    ====================ENDO====================  #Type 2 DM  - A1c 8.3, sugars uncontrolled on admission likely stress induced s/p cardiac arrest  - insulin gtt transitioned off  AM  - lantus 8, premeal 8, low ISS    ====================HEME====================  - H/H and platelets stable    # VTE prophylaxis   - c/w heparin gtt given LV Thrombus and IABP     ====================ID====================  - afebrile, continue to monitor off abx  - concern for aspiration event prior to intubation on admission - s/p vanco and cefepime (-  - BCx 11/10 NGTD, RVP neg, MRSA neg, UA neg    #COVID  - Off airborne precautions     Pre-transplant ID w/u   - trend ID recs for serologies           LINES:   RIJ [place] , RAx Jacklyn , LFA IABP       Patient requires continuous monitoring with bedside rhythm monitoring, pulse ox monitoring, and intermittent blood gas analysis. Care plan discussed with ICU care team. Patient remained critical and at risk for life threatening decompensation.  Patient seen, examined and plan discussed with CCU team during rounds.     I have personally provided ____ minutes of critical care time excluding time spent on separate procedures, in addition to initial critical care time provided by the CICU Attending, Dr. Durand.    By signing my name below, I, Kalyn Sousa, attest that this documentation has been prepared under the direction and in the presence of Rita Hawthorne NP.  Electronically signed: Rosalba Booth, 23 @ 20:48    I, Rita Hawthorne , personally performed the services described in this documentation. all medical record entries made by the eileenibe were at my direction and in my presence. I have reviewed the chart and agree that the record reflects my personal performance and is accurate and complete  Electronically signed: Rita Hawthorne NP.       LD VALENCIA  MRN-[id number]  Patient is a 58y old  Male who presents with a chief complaint of Heart failure (2023 17:36)    HPI: 58M w/ hx HTN, CAD w/ 1 stent in , ICH () presenting with abn ekg. Patient presented to [facility] where he was found to have STEMI, recommended to get cath however patient did not want to get it there so it left and came here.  Patient initially had cough, congestion, fever, was placed on antibiotics on .  Started feeling nauseous and had a presyncopal event after which he presented to ED last night.  Had chest pain as well.  Chest pain is midsternal.  Not currently having chest pain.  Received 4 aspirin 30 min pta. (2023 15:11)    REVIEW OF SYSTEMS:  CONSTITUTIONAL: No weakness, fevers or chills  EYES/ENT: No visual changes;  No vertigo or throat pain   NECK: No pain or stiffness  RESPIRATORY: No cough, wheezing, hemoptysis; No shortness of breath  CARDIOVASCULAR: No chest pain or palpitations  GASTROINTESTINAL: No abdominal or epigastric pain  No nausea, vomiting, or hematemesis; No diarrhea or constipation. No melena or hematochezia.  GENITOURINARY: No dysuria, frequency or hematuria  NEUROLOGICAL: No numbness or weakness  SKIN: No itching, rashes    ICU Vital Signs Last 24 Hrs  T(C): 37.1 (2023 19:00), Max: 37.6 (2023 07:00)  T(F): 98.8 (2023 19:00), Max: 99.7 (2023 07:00)  HR: 91 (2023 20:00) (84 - 101)  BP: 106/57 (2023 14:00) (106/57 - 106/57)  BP(mean): 74 (2023 14:00) (74 - 74)  ABP: 123/73 (2023 20:00) (110/55 - 136/61)  ABP(mean): 102 (2023 20:00) (86 - 110)  RR: 25 (2023 20:00) (19 - 32)  SpO2: 93% (2023 20:00) (92% - 97%)    O2 Parameters below as of 2023 20:00  Patient On (Oxygen Delivery Method): room air      CVP(mm Hg): 9 (23 @ 20:00) (-1 - 15)  CO: 8 (23 @ 00:00) (8 - 8)  CI: 3.7 (23 @ 00:00) (3.7 - 3.7)  PA: 45/13 (23 @ 20:00) (25/3 - 65/25)  PA(mean): 30 (23 @ 20:00) (15 - 44)  SVR: 898 (23 @ 00:00) (898 - 898)  SVRI: 1943 (23 @ 00:00) (1943 - )      I&O's Summary    2023 07:  -  2023 07:00  --------------------------------------------------------  IN: 834.7 mL / OUT: 1800 mL / NET: -965.3 mL    2023 07:  -  2023 20:48  --------------------------------------------------------  IN: 786.2 mL / OUT: 1030 mL / NET: -243.8 mL      CAPILLARY BLOOD GLUCOSE  POCT Blood Glucose.: 124 mg/dL (2023 16:50)  ============================I/O===========================   I&O's Detail    2023 07:01  -  2023 07:00  --------------------------------------------------------  IN:    Heparin: 383.5 mL    Lidocaine: 91.2 mL    Oral Fluid: 360 mL  Total IN: 834.7 mL    OUT:    Indwelling Catheter - Urethral (mL): 1800 mL  Total OUT: 1800 mL    Total NET: -965.3 mL      2023 07:01  -  2023 20:48  --------------------------------------------------------  IN:    Heparin: 191 mL    IV PiggyBack: 100 mL    Lidocaine: 15.2 mL    Oral Fluid: 480 mL  Total IN: 786.2 mL    OUT:    Indwelling Catheter - Urethral (mL): 1030 mL  Total OUT: 1030 mL    Total NET: -243.8 mL  ============================ LABS =========================                     12.1   10.96 )-----------( 247      ( 2023 04:45 )             37.0         137  |  105  |  35<H>  ----------------------------<  138<H>  4.3   |  17<L>  |  2.04<H>    Ca    9.6      2023 04:45  Phos  2.9       Mg     2.2         TPro  7.5  /  Alb  3.3  /  TBili  0.8  /  DBili  x   /  AST  45<H>  /  ALT  92<H>  /  AlkPhos  63      LIVER FUNCTIONS - ( 2023 04:45 )  Alb: 3.3 g/dL / Pro: 7.5 g/dL / ALK PHOS: 63 U/L / ALT: 92 U/L / AST: 45 U/L / GGT: x           PT/INR - ( 2023 04:45 )   PT: 14.5 sec;   INR: 1.39 ratio    PTT - ( 2023 12:47 )  PTT:90.4 sec    ABG - ( 2023 04:35 )  pH, Arterial: 7.45  pH, Blood: x     /  pCO2: 27    /  pO2: 120   / HCO3: 19    / Base Excess: -3.9  /  SaO2: 99.4      Blood Gas Arterial, Lactate: 1.1 mmol/L (23 @ 04:35)  Blood Gas Venous - Lactate: 1.2 mmol/L (23 @ 04:35)  Blood Gas Venous - Lactate: 0.8 mmol/L (23 @ 17:48)  Blood Gas Arterial, Lactate: 0.7 mmol/L (23 @ 00:45)  Blood Gas Arterial, Lactate: 0.7 mmol/L (23 @ 17:50)  Blood Gas Arterial, Lactate: 1.1 mmol/L (23 @ 00:44)    Urinalysis Basic - ( 2023 04:45 )    Color: x / Appearance: x / SG: x / pH: x  Gluc: 138 mg/dL / Ketone: x  / Bili: x / Urobili: x   Blood: x / Protein: x / Nitrite: x   Leuk Esterase: x / RBC: x / WBC x   Sq Epi: x / Non Sq Epi: x / Bacteria: x  ======================Micro/Rad/Cardio=================  Telemtry: Reviewed   EKG: Reviewed  CXR: Reviewed  Culture: Reviewed   Echo:   Cath: Cardiac Cath Lab - Adult:   [facility]  Department of Cardiology  [address] [place] [address]  [phone]  Cath Lab Report -- Comprehensive Report  Patient: LD VALENCIA  Study date: 2017  Account number: [id number]  MR number: [id number]  : 1965  Gender: Male  Race: W  Case Physician(s):  Jairon Holguin M.D.  Referring Physician:  Luc Lynn M.D.  INDICATIONS: Unstable angina - CCS4.  HISTORY: The patient has a history of coronary artery disease. The patient  hashypertension and medication-treated dyslipidemia.  PROCEDURE:  --  Left heart catheterization with ventriculography.  --  Left coronary angiography.  --  Right coronary angiography.  TECHNIQUE: The risks and alternatives of the procedures and conscious  sedation were explained to the patient and informed consent was obtained.  Cardiac catheterization performed electively.  Local anesthetic given. Right radial artery access. A 6FR PRELUDE KIT was  inserted in the vessel. Left heart catheterization. Ventriculography was  performed. A 5FR FR4.0 EXPO catheter was utilized. Left coronary artery  angiography. The vessel was injected utilizing a 5FR FL3.5 EXPO catheter.  Right coronary artery angiography. The vessel was injected utilizing a 5FR  FR4.0 EXPO catheter. RADIATION EXPOSURE: 1.1 min.  CONTRAST GIVEN: Omnipaque 55 ml.  MEDICATIONS GIVEN: Midazolam, 1 mg, IV. Fentanyl, 25 mcg, IV. Verapamil  (Isoptin, Calan, Covera), 2.5 mg, IA. Heparin, 3000 units, IA.  VENTRICLES: Global left ventricular function was moderately depressed. EF  estimated was 40 %.  CORONARY VESSELS: The coronary circulation is right dominant.  LM:   --  LM: Normal.  LAD:   --  Proximal LAD: There was a 50 % stenosis.  CX:   --  Circumflex: Normal.  RCA:   --  Mid RCA: There was a 40 % stenosis.  --  Distal RCA: There was a 50 % stenosis.  COMPLICATIONS: There were no complications.  DIAGNOSTIC RECOMMENDATIONS: The patient should continue with the present  medications.  Prepared and signed by  Jairon Holguin M.D.  Signed 2017 12:20:13  HEMODYNAMIC TABLES  Pressures:  Baseline/ Room Air  Pressures:  - HR: 78  Pressures:  - Rhythm:  Pressures:  -- Aortic Pressure (S/D/M): --/--/99  Pressures:  -- Left Ventricle (s/edp): 157/39/--  Outputs:  Baseline/ Room Air  Outputs:  -- CALCULATIONS[age]  Outputs:  -- CALCULATIONS: Body Surface Area: 2.05  Outputs:  -- CALCULATIONS: Height in cm: 175.00  Outputs:  -- CALCULATIONS: Sex: Male  Outputs:  -- CALCULATIONS: Weight in k.40 (17 @ 21:55)  ======================================================  PAST MEDICAL & SURGICAL HISTORY:  HTN (hypertension)      CAD (coronary artery disease)  ; stent      Intracranial hemorrhage        Respiratory arrest        Myocardial infarction, unspecified MI type, unspecified artery      History of coronary artery stent placement  ====================ASSESSMENT ==============  57 yo M with HTN, CAD (s/p PCI ), HFrEF, CVA , and T2DM presenting with chest pressure and unknown tachycardia that was shocked x1, [facility]  found to have in-stent restenosis of pLAD and  of RCA with elevated RA and PA pressures and severely decreased EF 32%, admitted to CICU for management of cardiogenic shock requiring IABP  -, with hospital course c/b vfib arrest.     PLAN  ====================NEURO====================  # Anxiety  - Ativan 0.5 mg PO Q 6hrs PRN   - No Seroquel or antipsychotics since vfib arrest and prolonged QTC  - Psych Eval last week, recs for continuing Ativan PRN and had recommended SSRI, but pt refused     ====================PULM====================  # Acute Hypoxemic Respiratory Failure  - s/p 2 intubations for cardiogenic pulm edema and the in setting of cardiac arrest  - Extubated 11/10  - currently on room air with spO2 mid 90s    # COVID +  - off isolation: cleared by ID today()     # Asthma (hx of respiratory failure in 2018- intubated for 20 minutes per chart review pt report)  - c/w albuterol, symbicort and spiriva  - on trelegy at home    ====================CARDIOVASCULAR====================  # Vfib arrest insetting of ischemia vs. shock  - lido@0.5   - PO Amio load dose (s/p IV amio ~1950mg lV gtt) for total of 5 grams per EP to complete   - Eventual BB when more comensated  - Keep K > 4, Mag > 2.2     # Cardiogenic shock requiring IABP (- , -)  - likely 2/2 NSTEMI and ADHF  -  LHC: pLAD 100 % in-stent restenosis & mRCA, 100 %. PCWP 30. IABP placed.  -  TTE: LV dilated. EF 32 %. Regional WMAs present, mod (grade 2) LV diastolic dysfunction  -  TTE: EF 22% and + LV thrombus   - IABP removed , vfib , IABP later reinserted that day for CS  - D/C Milrinone gtt 7:30 am   - currently on hydralazine 100 TID and ISD 40 TID: ALR, as Cr improves consider ARB initiation  - Holding diuretics at this time, PRN Bumex IVP as needed for goal CVP <10  - continue romel 25 daily for GDMT  - AT Eval, F/u HF recs    # NSTEMI- Stent re-occlusion of pLAD and 100%  of RCA  - EKG on admission w/LBBB  - DAPT: c/w ASA, brillinta   - Cont. lipitor 80  - cMR deferred given necessity of IABP  - CT sx not recommending CABG, undergoing AT eval    # LV thrombus  - c/w heparin gtt    ====================/RENAL====================  #non-oliguric ARIC   - sCr now improving. Baseline Cr: 1-1.22  - diuresis held, optimize pre-renal factors for optimal perfusion and volume status  - Trend BMP, lytes daily, replace as needed  - continue Strict I/Os, avoid nephrotoxins    ====================GI====================  #Diet  -Soft and bite sized     #GERD  - c/w home protonix    # Bowel regimen  - miralax and senna d/c'd iso loose stools- trend quantity and quality of BMs    ====================ENDO====================  #Type 2 DM  - A1c 8.3, sugars uncontrolled on admission likely stress induced s/p cardiac arrest  - insulin gtt transitioned off  AM  - lantus 8, premeal 8, low ISS    ====================HEME====================  - H/H and platelets stable    # VTE prophylaxis   - c/w heparin gtt given LV Thrombus and IABP     ====================ID====================  - afebrile, continue to monitor off abx  - concern for aspiration event prior to intubation on admission - s/p vanco and cefepime (-  - BCx 11/10 NGTD, RVP neg, MRSA neg, UA neg    #COVID  - Off airborne precautions     Pre-transplant ID w/u   - trend ID recs for serologies       LINES:   RIJ [place] , RAx Jacklyn , LFA IABP       Patient requires continuous monitoring with bedside rhythm monitoring, pulse ox monitoring, and intermittent blood gas analysis. Care plan discussed with ICU care team. Patient remained critical and at risk for life threatening decompensation.  Patient seen, examined and plan discussed with CCU team during rounds.     I have personally provided 35 minutes of critical care time excluding time spent on separate procedures, in addition to initial critical care time provided by the CICU Attending, Dr. [place].    By signing my name below, I, Kalyn Sousa, attest that this documentation has been prepared under the direction and in the presence of Rita Hawthorne NP.  Electronically signed: Rosalba Booth, 23 @ 20:48    I, Rita Hawthorne , personally performed the services described in this documentation. all medical record entries made by the eileenibmartha were at my direction and in my presence. I have reviewed the chart and agree that the record reflects my personal performance and is accurate and complete  Electronically signed: Rita Hawthorne, NP

## 2023-11-13 NOTE — PROGRESS NOTE ADULT - ASSESSMENT
59 yo male h/o htn, cad s/p pci, ICH, here with NSTEMI  s/p intubation and cath. now in CCU    NSTEMI  s/p  cath  cath results noted. multi vessel dz., CTSx f/u.   hep gtt  pt with vtach/fib arrest again on 11/8. s/p ACLS and ROSC.   now extubated  IABP in place  mngt as per CCU  plan for transplant as per HF and transplant team    LV thrombus   hep gtt    acute on chronic systolic heart failure  heart failure team f/u      pna  recently diagnosed outpt  iv abx now stopped  f/u cult   id following     covid  supportive care    h/o ICH  resolved    agitation  psy consult f/u    mngt as per CCU team  d/w CCU attn    d/w pt and pts wife bedside       Advanced care planning was discussed with patient and family.  Advanced care planning forms were reviewed and discussed as appropriate.  Differential diagnosis and plan of care discussed with patient after the evaluation.   Pain assessed and judicious use of narcotics when appropriate was discussed.  Importance of Fall prevention discussed.  Counseling on Smoking and Alcohol cessation was offered when appropriate.  Counseling on Diet, exercise, and medication compliance was done.       Approx 75 minutes spent.

## 2023-11-13 NOTE — PROGRESS NOTE ADULT - ASSESSMENT
58M with HTN, CAD (s/p PCI 2008), ICH 2008, HFrEF (EF severely reduced 2018) presenting with chest pressure on 11/1. Prior to cath was intubated 2/2 AHRF, s/p LHC showed pLAD 70 % stenosis in the ostial portion of the segment and 100 % in-stent restenosis, 100 % stenosis mRCA and RHC revealing elevated filling pressures and marginal perfusion indices for which IABP was inserted. He was admitted to CICU, extubated to nasal canula on 11/3, IABP weaned and discontinued 11/7. While in CICU he was diuresed and weaned off inotropes, intiated on PO GDMT, was downgraded to the floor on 11/8. Of note, he was pending cardiac viability study; however, had been deferred for CABG by CTS. On 11/8 he had witnessed seizure activity by his family and lost pulse. Code blue called for PEA arrest, telemetry reveals VT/VF. ROSC achieved after shock delivered x3, Lidocaine x1, Amio x1 and Mg x1 given. EP consulted for VT/VF arrest.     1. HFrEF, 32% w/ WMA on milrinone with IABP   2. CAD   3. Cardiogenic shock   4. VT/VF arrest    - On PO amiodarone load 10g  - Wean Lido as tolerated  - LVAD/Heart transplant work-up initiated   - Close telemetry monitoring  - Keep Mg > 2 and K > 4    Note not final until signed by attending       Rosa Carreon MD  Cardiology Fellow PGY-6  Phone: [phone]    For all New Consults  [email or web address]   Login: [place] 58M with HTN, CAD (s/p PCI 2008), ICH 2008, HFrEF (EF severely reduced 2018) presenting with chest pressure on 11/1. Prior to cath was intubated 2/2 AHRF, s/p LHC showed pLAD 70 % stenosis in the ostial portion of the segment and 100 % in-stent restenosis, 100 % stenosis mRCA and RHC revealing elevated filling pressures and marginal perfusion indices for which IABP was inserted. He was admitted to CICU, extubated to nasal canula on 11/3, IABP weaned and discontinued 11/7. While in CICU he was diuresed and weaned off inotropes, intiated on PO GDMT, was downgraded to the floor on 11/8. Of note, he was pending cardiac viability study; however, had been deferred for CABG by CTS. On 11/8 he had witnessed seizure activity by his family and lost pulse. Code blue called for PEA arrest, telemetry reveals VT/VF. ROSC achieved after shock delivered x3, Lidocaine x1, Amio x1 and Mg x1 given. EP consulted for VT/VF arrest.     1. HFrEF, 32% w/ WMA on milrinone with IABP   2. CAD   3. Cardiogenic shock   4. VT/VF arrest    - On PO amiodarone load 10g  - Wean Lido as tolerated  - LVAD/Heart transplant work-up initiated   - Close telemetry monitoring  - Keep Mg > 2 and K > 4    Note not final until signed by attending       Rosa Carreon MD  Cardiology Fellow PGY-6  Phone: [phone]    For all New Consults  [email or web address]   Login: [place] 58M with HTN, CAD (s/p PCI 2008), ICH 2008, HFrEF (EF severely reduced 2018) presenting with chest pressure on 11/1. Prior to cath was intubated 2/2 AHRF, s/p LHC showed pLAD 70 % stenosis in the ostial portion of the segment and 100 % in-stent restenosis, 100 % stenosis mRCA and RHC revealing elevated filling pressures and marginal perfusion indices for which IABP was inserted. He was admitted to CICU, extubated to nasal canula on 11/3, IABP weaned and discontinued 11/7. While in CICU he was diuresed and weaned off inotropes, intiated on PO GDMT, was downgraded to the floor on 11/8. Of note, he was pending cardiac viability study; however, had been deferred for CABG by CTS. On 11/8 he had witnessed seizure activity by his family and lost pulse. Code blue called for PEA arrest, telemetry reveals VT/VF. ROSC achieved after shock delivered x3, Lidocaine x1, Amio x1 and Mg x1 given. EP consulted for VT/VF arrest.     1. HFrEF, 32% w/ WMA on milrinone with IABP   2. CAD   3. Cardiogenic shock   4. VT/VF arrest    - On PO amiodarone load 10g  - Wean Lido as tolerated  - LVAD/Heart transplant work-up initiated   - Close telemetry monitoring  - Keep Mg > 2 and K > 4    Note not final until signed by attending       Rosa Carreon MD  Cardiology Fellow PGY-6  Phone: [phone]    For all New Consults  [email or web address]   Login: [place] 58M with HTN, CAD (s/p PCI 2008), ICH 2008, HFrEF (EF severely reduced 2018) presenting with chest pressure on 11/1. Prior to cath was intubated 2/2 AHRF, s/p LHC showed pLAD 70 % stenosis in the ostial portion of the segment and 100 % in-stent restenosis, 100 % stenosis mRCA and RHC revealing elevated filling pressures and marginal perfusion indices for which IABP was inserted. He was admitted to CICU, extubated to nasal canula on 11/3, IABP weaned and discontinued 11/7. While in CICU he was diuresed and weaned off inotropes, intiated on PO GDMT, was downgraded to the floor on 11/8. Of note, he was pending cardiac viability study; however, had been deferred for CABG by CTS. On 11/8 he had witnessed seizure activity by his family and lost pulse. Code blue called for PEA arrest, telemetry reveals VT/VF. ROSC achieved after shock delivered x3, Lidocaine x1, Amio x1 and Mg x1 given. EP consulted for VT/VF arrest.     1. HFrEF, 32% w/ WMA on milrinone with IABP   2. CAD   3. Cardiogenic shock   4. VT/VF arrest    - On PO amiodarone load 10g  - Wean Lido as tolerated  - LVAD/Heart transplant work-up initiated   - Close telemetry monitoring  - Keep Mg > 2 and K > 4  - EP will sign off    Note not final until signed by attending       Rosa Carreon MD  Cardiology Fellow PGY-6  Phone: [phone]    For all New Consults  [email or web address]   Login: [place] 58M with HTN, CAD (s/p PCI 2008), ICH 2008, HFrEF (EF severely reduced 2018) presenting with chest pressure on 11/1. Prior to cath was intubated 2/2 AHRF, s/p LHC showed pLAD 70 % stenosis in the ostial portion of the segment and 100 % in-stent restenosis, 100 % stenosis mRCA and RHC revealing elevated filling pressures and marginal perfusion indices for which IABP was inserted. He was admitted to CICU, extubated to nasal canula on 11/3, IABP weaned and discontinued 11/7. While in CICU he was diuresed and weaned off inotropes, intiated on PO GDMT, was downgraded to the floor on 11/8. Of note, he was pending cardiac viability study; however, had been deferred for CABG by CTS. On 11/8 he had witnessed seizure activity by his family and lost pulse. Code blue called for PEA arrest, telemetry reveals VT/VF. ROSC achieved after shock delivered x3, Lidocaine x1, Amio x1 and Mg x1 given. EP consulted for VT/VF arrest.     1. HFrEF, 32% w/ WMA on milrinone with IABP   2. CAD   3. Cardiogenic shock   4. VT/VF arrest    - On PO amiodarone load 10g  - Wean Lido as tolerated  - LVAD/Heart transplant work-up initiated   - Close telemetry monitoring  - Keep Mg > 2 and K > 4  - EP will sign off    Note not final until signed by attending       Rosa Carreon MD  Cardiology Fellow PGY-6  Phone: [phone]    For all New Consults  [email or web address]   Login: [place] 58M with HTN, CAD (s/p PCI 2008), ICH 2008, HFrEF (EF severely reduced 2018) presenting with chest pressure on 11/1. Prior to cath was intubated 2/2 AHRF, s/p LHC showed pLAD 70 % stenosis in the ostial portion of the segment and 100 % in-stent restenosis, 100 % stenosis mRCA and RHC revealing elevated filling pressures and marginal perfusion indices for which IABP was inserted. He was admitted to CICU, extubated to nasal canula on 11/3, IABP weaned and discontinued 11/7. While in CICU he was diuresed and weaned off inotropes, intiated on PO GDMT, was downgraded to the floor on 11/8. Of note, he was pending cardiac viability study; however, had been deferred for CABG by CTS. On 11/8 he had witnessed seizure activity by his family and lost pulse. Code blue called for PEA arrest, telemetry reveals VT/VF. ROSC achieved after shock delivered x3, Lidocaine x1, Amio x1 and Mg x1 given. EP consulted for VT/VF arrest.     1. HFrEF, 32% w/ WMA on milrinone with IABP   2. CAD   3. Cardiogenic shock   4. VT/VF arrest    - On PO amiodarone load 10g  - Wean Lido as tolerated  - LVAD/Heart transplant work-up initiated   - Close telemetry monitoring  - Keep Mg > 2 and K > 4  - EP will sign off    Note not final until signed by attending       Rosa Carreon MD  Cardiology Fellow PGY-6  Phone: [phone]    For all New Consults  [email or web address]   Login: [place]

## 2023-11-13 NOTE — CONSULT NOTE ADULT - SUBJECTIVE AND OBJECTIVE BOX
HPI:  LD VALENCIA is a 58 year old male with history of HTN, CAD (s/p PCI 2008), HFrEF, CVA 2018, and T2DM presenting with chest pressure and unknown tachycardia that was shocked x1, [facility] 11/1 found to have in-stent restenosis of pLAD and  of RCA with elevated RA and PA pressures and severely decreased EF 32%, admitted to CICU for management of cardiogenic shock requiring IABP 11/1 -11/7, with hospital course c/b vfib arrest and COVID+.     Complicated hospital course with cardiogenic shock, in-stent restenosis of coronary lesion, and VF arrest as above.  Hepatology consulted for heart transplant/LVAD evaluation.  Patient denies history of abnormal liver chemistries, liver disease, cirrhosis, jaundice, ascites, paracentesis, herbal remedies, dietary supplements, alternative over-the-counter medications, or family history of liver disease.    Otherwise, patient denies fevers, chills, weight loss, dysphagia, odynophagia, early satiety, poor oral intake, abdominal pain, nausea, vomiting, diarrhea, melena, hematemesis, hematochezia, change in stool caliber, or family history of GI-related cancers.    ROS:   General:  No fevers, chills, night sweats, fatigue  Eyes:  Good vision, no reported pain  ENT:  No sore throat, pain, runny nose  CV:  No pain, palpitations  Pulm:  No dyspnea, cough  GI:  See HPI, otherwise negative  :  No  incontinence, nocturia  Muscle:  No pain, weakness  Neuro:  No memory problems  Psych:  No insomnia, mood problems, depression  Endocrine:  No polyuria, polydipsia, cold/heat intolerance  Heme:  No petechiae, ecchymosis, easy bruisability  Skin:  No rash    PMHX/PSHX:    HTN (hypertension)    CAD (coronary artery disease)    Intracranial hemorrhage    Respiratory arrest    Myocardial infarction, unspecified MI type, unspecified artery    History of coronary artery stent placement      Allergies:  penicillins (Unknown)      Home Medications: reviewed  Hospital Medications:  albuterol/ipratropium for Nebulization 3 milliLiter(s) Nebulizer every 6 hours PRN  aMIOdarone    Tablet 400 milliGRAM(s) Oral every 8 hours  aMIOdarone    Tablet   Oral   aspirin  chewable 81 milliGRAM(s) Oral daily  atorvastatin 80 milliGRAM(s) Oral at bedtime  benzonatate 100 milliGRAM(s) Oral once  budesonide  80 MICROgram(s)/formoterol 4.5 MICROgram(s) Inhaler 2 Puff(s) Inhalation two times a day  chlorhexidine 2% Cloths 1 Application(s) Topical daily  dextrose 50% Injectable 25 Gram(s) IV Push once  dextrose 50% Injectable 12.5 Gram(s) IV Push once  glucagon  Injectable 1 milliGRAM(s) IntraMuscular once  guaiFENesin Oral Liquid (Sugar-Free) 100 milliGRAM(s) Oral every 6 hours PRN  heparin  Infusion 1600 Unit(s)/Hr IV Continuous <Continuous>  hydrALAZINE 100 milliGRAM(s) Oral three times a day  insulin glargine Injectable (LANTUS) 8 Unit(s) SubCutaneous at bedtime  insulin lispro (ADMELOG) corrective regimen sliding scale   SubCutaneous three times a day before meals  insulin lispro Injectable (ADMELOG) 8 Unit(s) SubCutaneous three times a day before meals  isosorbide   dinitrate Tablet (ISORDIL) 40 milliGRAM(s) Oral three times a day  lidocaine   4% Patch 1 Patch Transdermal every 24 hours  lidocaine   Infusion 0.5 mG/Min IV Continuous <Continuous>  LORazepam     Tablet 0.5 milliGRAM(s) Oral every 6 hours PRN  pantoprazole  Injectable 40 milliGRAM(s) IV Push daily  sodium chloride 3%  Inhalation 4 milliLiter(s) Inhalation every 12 hours  spironolactone 25 milliGRAM(s) Oral daily  ticagrelor 90 milliGRAM(s) Oral every 12 hours  tiotropium 2.5 MICROgram(s) Inhaler 2 Puff(s) Inhalation daily      Social History:   Tobacco: denies  Alcohol: denies  Recreational drugs: denies    Family history:    Family history of meningitis (Sibling)    Family history of hypertension in mother      Denies family history of colon cancer/polyps, stomach cancer/polyps, pancreatic cancer/masses, liver cancer/disease, ovarian cancer and endometrial cancer.    PHYSICAL EXAM:   Vital Signs:  Vital Signs Last 24 Hrs  T(C): 37.6 (13 Nov 2023 07:00), Max: 37.6 (13 Nov 2023 07:00)  T(F): 99.7 (13 Nov 2023 07:00), Max: 99.7 (13 Nov 2023 07:00)  HR: 101 (13 Nov 2023 10:00) (81 - 101)  BP: --  BP(mean): --  RR: 26 (13 Nov 2023 10:00) (16 - 32)  SpO2: 94% (13 Nov 2023 10:00) (92% - 97%)    Parameters below as of 13 Nov 2023 10:00  Patient On (Oxygen Delivery Method): room air      Daily     Daily     GENERAL: no acute distress, IABP in place  NEURO: alert  HEENT: Beaumont-Sonja catheter in place, NCAT, no conjunctival pallor appreciated  CHEST: no respiratory distress, no accessory muscle use  CARDIAC: regular rate, +S1/S2  ABDOMEN: soft, nontender, no rebound or guarding  EXTREMITIES: warm, well perfused  SKIN: no lesions noted    LABS: reviewed                        12.1   10.96 )-----------( 247      ( 13 Nov 2023 04:45 )             37.0     11-13    137  |  105  |  35<H>  ----------------------------<  138<H>  4.3   |  17<L>  |  2.04<H>    Ca    9.6      13 Nov 2023 04:45  Phos  2.9     11-13  Mg     2.2     11-13    TPro  7.5  /  Alb  3.3  /  TBili  0.8  /  DBili  x   /  AST  45<H>  /  ALT  92<H>  /  AlkPhos  63  11-13    LIVER FUNCTIONS - ( 13 Nov 2023 04:45 )  Alb: 3.3 g/dL / Pro: 7.5 g/dL / ALK PHOS: 63 U/L / ALT: 92 U/L / AST: 45 U/L / GGT: x               Diagnostic Studies: see sunrise for full report

## 2023-11-13 NOTE — PROGRESS NOTE ADULT - SUBJECTIVE AND OBJECTIVE BOX
PATIENT:  LD VALENCIA  [id number]    CHIEF COMPLAINT:  Patient is a 58y old  Male who presents with a chief complaint of ventricular fibrillation (12 Nov 2023 18:14)      INTERVAL HISTORYOVERNIGHT EVENTS:      REVIEW OF SYSTEMS:    Constitutional:     [ ] negative [ ] fevers [ ] chills [ ] weight loss [ ] weight gain  HEENT:                  [ ] negative [ ] dry eyes [ ] eye irritation [ ] postnasal drip [ ] nasal congestion  CV:                         [ ] negative  [ ] chest pain [ ] orthopnea [ ] palpitations [ ] murmur  Resp:                     [ ] negative [ ] cough [ ] shortness of breath [ ] dyspnea [ ] wheezing [ ] sputum [ ] hemoptysis  GI:                          [ ] negative [ ] nausea [ ] vomiting [ ] diarrhea [ ] constipation [ ] abd pain [ ] dysphagia   :                        [ ] negative [ ] dysuria [ ] nocturia [ ] hematuria [ ] increased urinary frequency  Musculoskeletal: [ ] negative [ ] back pain [ ] myalgias [ ] arthralgias [ ] fracture  Skin:                       [ ] negative [ ] rash [ ] itch  Neurological:        [ ] negative [ ] headache [ ] dizziness [ ] syncope [ ] weakness [ ] numbness  Psychiatric:           [ ] negative [ ] anxiety [ ] depression  Endocrine:            [ ] negative [ ] diabetes [ ] thyroid problem  Heme/Lymph:      [ ] negative [ ] anemia [ ] bleeding problem  Allergic/Immune: [ ] negative [ ] itchy eyes [ ] nasal discharge [ ] hives [ ] angioedema    [x ] All other systems negative  [ ] Unable to assess ROS because ________.    MEDICATIONS:  MEDICATIONS  (STANDING):  aMIOdarone    Tablet 400 milliGRAM(s) Oral every 8 hours  aMIOdarone    Tablet   Oral   aspirin  chewable 81 milliGRAM(s) Oral daily  atorvastatin 80 milliGRAM(s) Oral at bedtime  benzonatate 100 milliGRAM(s) Oral once  budesonide  80 MICROgram(s)/formoterol 4.5 MICROgram(s) Inhaler 2 Puff(s) Inhalation two times a day  chlorhexidine 2% Cloths 1 Application(s) Topical daily  dextrose 50% Injectable 12.5 Gram(s) IV Push once  dextrose 50% Injectable 25 Gram(s) IV Push once  glucagon  Injectable 1 milliGRAM(s) IntraMuscular once  heparin  Infusion 1600 Unit(s)/Hr (15.5 mL/Hr) IV Continuous <Continuous>  hydrALAZINE 100 milliGRAM(s) Oral three times a day  insulin glargine Injectable (LANTUS) 8 Unit(s) SubCutaneous at bedtime  insulin lispro (ADMELOG) corrective regimen sliding scale   SubCutaneous three times a day before meals  insulin lispro Injectable (ADMELOG) 8 Unit(s) SubCutaneous three times a day before meals  isosorbide   dinitrate Tablet (ISORDIL) 40 milliGRAM(s) Oral three times a day  lidocaine   4% Patch 1 Patch Transdermal every 24 hours  lidocaine   Infusion 0.5 mG/Min (3.75 mL/Hr) IV Continuous <Continuous>  pantoprazole  Injectable 40 milliGRAM(s) IV Push daily  sodium chloride 3%  Inhalation 4 milliLiter(s) Inhalation every 12 hours  spironolactone 25 milliGRAM(s) Oral daily  ticagrelor 90 milliGRAM(s) Oral every 12 hours  tiotropium 2.5 MICROgram(s) Inhaler 2 Puff(s) Inhalation daily    MEDICATIONS  (PRN):  albuterol/ipratropium for Nebulization 3 milliLiter(s) Nebulizer every 6 hours PRN Shortness of Breath and/or Wheezing  guaiFENesin Oral Liquid (Sugar-Free) 100 milliGRAM(s) Oral every 6 hours PRN Cough  LORazepam     Tablet 0.5 milliGRAM(s) Oral every 6 hours PRN Anxiety      ALLERGIES:  Allergies    penicillins (Unknown)    Intolerances        OBJECTIVE:  ICU Vital Signs Last 24 Hrs  T(C): 36.8 (12 Nov 2023 19:00), Max: 37.3 (12 Nov 2023 11:00)  T(F): 98.2 (12 Nov 2023 19:00), Max: 99.1 (12 Nov 2023 11:00)  HR: 89 (13 Nov 2023 06:00) (81 - 94)  BP: --  BP(mean): --  ABP: 136/61 (13 Nov 2023 06:00) (105/64 - 136/61)  ABP(mean): 105 (13 Nov 2023 06:00) (86 - 110)  RR: 21 (13 Nov 2023 06:00) (16 - 27)  SpO2: 94% (13 Nov 2023 06:00) (92% - 100%)    O2 Parameters below as of 12 Nov 2023 20:50  Patient On (Oxygen Delivery Method): room air            Adult Advanced Hemodynamics Last 24 Hrs  CVP(mm Hg): 8 (13 Nov 2023 06:00) (-3 - 8)  CVP(cm H2O): --  CO: --  CI: --  PA: 35/5 (13 Nov 2023 06:00) (28/4 - 49/12)  PA(mean): 20 (13 Nov 2023 06:00) (15 - 32)  PCWP: --  SVR: --  SVRI: --  PVR: --  PVRI: --  CAPILLARY BLOOD GLUCOSE      POCT Blood Glucose.: 141 mg/dL (12 Nov 2023 21:18)  POCT Blood Glucose.: 124 mg/dL (12 Nov 2023 16:53)  POCT Blood Glucose.: 132 mg/dL (12 Nov 2023 12:25)  POCT Blood Glucose.: 157 mg/dL (12 Nov 2023 08:06)    CAPILLARY BLOOD GLUCOSE      POCT Blood Glucose.: 141 mg/dL (12 Nov 2023 21:18)    I&O's Summary    12 Nov 2023 07:01  -  13 Nov 2023 07:00  --------------------------------------------------------  IN: 830.9 mL / OUT: 1775 mL / NET: -944.1 mL      Daily     Daily     PHYSICAL EXAMINATION:  General: WN/WD NAD  Neurology: A&Ox3, nonfocal, MOISE x 4  Respiratory: CTA B/L with mild decrease at b/l bases. no wheezes, crackles, rales  CV: RRR 97bpm S1S2 w/ IABP on standby, no murmurs, rubs or gallops  Abdominal: Soft, NT, ND +BS  Extremities: No edema, + peripheral pulses. LFA IABP dressing c/d/i      LABS:  ABG - ( 13 Nov 2023 04:35 )  pH, Arterial: 7.45  pH, Blood: x     /  pCO2: 27    /  pO2: 120   / HCO3: 19    / Base Excess: -3.9  /  SaO2: 99.4                                    12.1   10.96 )-----------( 247      ( 13 Nov 2023 04:45 )             37.0     11-13    137  |  105  |  35<H>  ----------------------------<  138<H>  4.3   |  17<L>  |  2.04<H>    Ca    9.6      13 Nov 2023 04:45  Phos  2.9     11-13  Mg     2.2     11-13    TPro  7.5  /  Alb  3.3  /  TBili  0.8  /  DBili  x   /  AST  45<H>  /  ALT  92<H>  /  AlkPhos  63  11-13    LIVER FUNCTIONS - ( 13 Nov 2023 04:45 )  Alb: 3.3 g/dL / Pro: 7.5 g/dL / ALK PHOS: 63 U/L / ALT: 92 U/L / AST: 45 U/L / GGT: x           PT/INR - ( 13 Nov 2023 04:45 )   PT: 14.5 sec;   INR: 1.39 ratio         PTT - ( 13 Nov 2023 04:45 )  PTT:82.2 sec        Urinalysis Basic - ( 13 Nov 2023 04:45 )    Color: x / Appearance: x / SG: x / pH: x  Gluc: 138 mg/dL / Ketone: x  / Bili: x / Urobili: x   Blood: x / Protein: x / Nitrite: x   Leuk Esterase: x / RBC: x / WBC x   Sq Epi: x / Non Sq Epi: x / Bacteria: x      ====================ASSESSMENT ==============  59 yo M with HTN, CAD (s/p PCI 2008), HFrEF, CVA 2018, and T2DM presenting with chest pressure and unknown tachycardia that was shocked x1, [facility] 11/1 found to have in-stent restenosis of pLAD and  of RCA with elevated RA and PA pressures and severely decreased EF 32%, admitted to CICU for management of cardiogenic shock requiring IABP 11/1 -11/7, with hospital course c/b vfib arrest.     PLAN    ====================NEURO====================  # Anxiety  - Ativan 0.5 mg PO Q 6hrs PRN   - No Seroquel or antipsychotics since vfib arrest and prolonged QTC  - Psych Eval last week, recs for continuing Ativan PRN and had recommended SSRI, but pt refused     ====================PULM====================  # Acute Hypoxemic Respiratory Failure  - s/p 2 intubations for cardiogenic pulm edema and the in setting of cardiac arrest  - Extubated 11/10  - currently on room air with spO2 mid 90s    # COVID +  - off isolation: cleared by ID today(11/11)     # Asthma (hx of respiratory failure in 2018- intubated for 20 minutes per chart review pt report)  - c/w albuterol, symbicort and spiriva  - on trelegy at home    ====================CARDIOVASCULAR====================  # Vfib arrest insetting of ischemia vs. shock  - lido@0.5   - PO Amio load dose (s/p IV amio ~1950mg lV gtt) for total of 5 grams per EP to complete 11/17  - Eventual BB when more comensated  - Keep K > 4, Mag > 2.2     # Cardiogenic shock requiring IABP (11/1- 11/7, 11/9-)  - likely 2/2 NSTEMI and ADHF  - 11/1 LHC: pLAD 100 % in-stent restenosis & mRCA, 100 %. PCWP 30. IABP placed.  - 11/1 TTE: LV dilated. EF 32 %. Regional WMAs present, mod (grade 2) LV diastolic dysfunction  - 11/2 TTE: EF 22% and + LV thrombus   - IABP removed 11/7, vfib 11/9, IABP later reinserted that day for CS  - D/C Milrinone gtt 7:30 am 11/11  - currently on hydralazine 100 TID and ISD 40 TID: ALR, as Cr improves consider ARB initiation  - Holding diuretics at this time, PRN Bumex IVP as needed for goal CVP <10  - continue romel 25 daily for GDMT  - AT Eval, F/u HF recs    # NSTEMI- Stent re-occlusion of pLAD and 100%  of RCA  - EKG on admission w/LBBB  - DAPT: c/w ASA, brillinta   - Cont. lipitor 80  - cMR deferred given necessity of IABP  - CT sx not recommending CABG, undergoing AT eval    # LV thrombus  - c/w heparin gtt    ====================/RENAL====================  #non-oliguric ARIC   - sCr now improving. Baseline Cr: 1-1.22  - diuresis held, optimize pre-renal factors for optimal perfusion and volume status  - Trend BMP, lytes daily, replace as needed  - continue Strict I/Os, avoid nephrotoxins    ====================GI====================  #Diet  -Soft and bite sized     #GERD  - c/w home protonix    # Bowel regimen  - miralax and senna d/c'd iso loose stools- trend quantity and quality of BMs    ====================ENDO====================  #Type 2 DM  - A1c 8.3, sugars uncontrolled on admission likely stress induced s/p cardiac arrest  - insulin gtt transitioned off 11/11 AM  - lantus 8, premeal 8, low ISS    ====================HEME====================  - H/H and platelets stable    # VTE prophylaxis   - c/w heparin gtt given LV Thrombus and IABP     ====================ID====================  - afebrile, continue to monitor off abx  - concern for aspiration event prior to intubation on admission - s/p vanco and cefepime (11/2-  - BCx 11/10 NGTD, RVP neg, MRSA neg, UA neg    #COVID  - Off airborne precautions 11/11    Pre-transplant ID w/u   - trend ID recs for serologies           LINES:   RICANDY Roberts 11/9, RAx Jacklyn 11/9, LFA IABP 11/9

## 2023-11-13 NOTE — PROGRESS NOTE ADULT - PROBLEM SELECTOR PLAN 3
- pLAD 70 % stenosis in the ostium portion of the segment and 100 % in-stent restenosis. mRCA, 100 % stenosis.   - Currently ASA, Atorvastatin 80mg and Brilinta  - IABP precludes CMRI to assess for viability but per CTSX, not a candidate for CABG.

## 2023-11-13 NOTE — CONSULT NOTE ADULT - ASSESSMENT
58 year old male with history of HTN, CAD (s/p PCI 2008), HFrEF, CVA 2018, and T2DM presenting with chest pressure and unknown tachycardia that was shocked x1, C 11/1 found to have in-stent restenosis of pLAD and  of RCA with elevated RA and PA pressures and severely decreased EF 32%, admitted to CICU for management of cardiogenic shock requiring IABP 11/1 -11/7, with hospital course c/b vfib arrest and COVID+.  Hepatology consulted for heart transplant/LVAD evaluation.  Patient denies history of abnormal liver chemistries, liver disease, cirrhosis, jaundice, ascites, paracentesis, herbal remedies, dietary supplements, alternative over-the-counter medications, or family history of liver disease.    # Hepatology evaluation for LVAD and/or advanced heart failure therapy  -thrombocytopenic during critical illness detailed above, however normal platelet count at baseline  -US/CT without evidence of cirrhosis, hepatosplenomegaly, or ascites    Recommendations:  -trend clinical symptoms, exam findings, vital signs, CBC, CMP, INR  -nonreactive HAV IgM, HBsAg, HBsAb, HBcAb IgM, HCV Ab  -once patient is clinically euvolemic and off IABP, please obtain U/S elastography [please enter "Ultrasound elastography" under Orders and select the 3rd option "Ultrasound elastography parenchyma" - this is different from a normal RUQ or Abd US]; please inform us if unable to obtain euvolemia or if having difficulty ordering/scheduling U/S elastography  -if elastography reveals no or minimal fibrosis, no further workup or testing prior to proceeding with advanced heart failure therapy  -if elastography reveals moderate or high risk of fibrosis, recommend liver biopsy       -if liver biopsy negative for evidence of cirrhosis, no further workup or testing required       -if liver biopsy confirms diagnosis of cirrhosis, can use the following calculators to risk stratify perioperative mortality based on patient information and planned procedure:            -BENJAMÍN-Gabo:                      -30 day mortality: 1.3%                      -90 day mortality: 4.0%                      -180 day mortality: 5.1%                      -90 day decompensation: 5.1%            -FINLEY                      -7 day mortality: 4.5%                      -30 day mortality: 17.3%                      -90 day mortality: 26.5%                      -1 year mortality: 40.4%                      -5 year mortality: 76.1%  -no other plans for anticipated inpatient hepatology interventions    Note incomplete until finalized by attending signature/attestation.    Federico Nixon  GI/Hepatology Fellow    MONDAY-FRIDAY 8AM-5PM:  Pager# [phone] ([facility]) or [phone] ([facility])    NON-URGENT CONSULTS:  Please email [email or web address]  AT NIGHT AND ON WEEKENDS:  Contact on-call GI fellow via answering service ([phone]) from 5pm-8am and on weekends/holidays     58 year old male with history of HTN, CAD (s/p PCI 2008), HFrEF, CVA 2018, and T2DM presenting with chest pressure and unknown tachycardia that was shocked x1, C 11/1 found to have in-stent restenosis of pLAD and  of RCA with elevated RA and PA pressures and severely decreased EF 32%, admitted to CICU for management of cardiogenic shock requiring IABP 11/1 -11/7, with hospital course c/b vfib arrest and COVID+.  Hepatology consulted for heart transplant/LVAD evaluation.  Patient denies history of abnormal liver chemistries, liver disease, cirrhosis, jaundice, ascites, paracentesis, herbal remedies, dietary supplements, alternative over-the-counter medications, or family history of liver disease.    # Hepatology evaluation for LVAD and/or advanced heart failure therapy  -thrombocytopenic during critical illness detailed above, however normal platelet count at baseline  -US/CT without evidence of cirrhosis, hepatosplenomegaly, or ascites    Recommendations:  -trend clinical symptoms, exam findings, vital signs, CBC, CMP, INR  -nonreactive HAV IgM, HBsAg, HBsAb, HBcAb IgM, HCV Ab  -check RUQ US with doppler to ensure hepatic and portal vein patency  -once patient is clinically euvolemic and off IABP, please obtain U/S elastography [please enter "Ultrasound elastography" under Orders and select the 3rd option "Ultrasound elastography parenchyma" - this is different from a normal RUQ or Abd US]; please inform us if unable to obtain euvolemia or if having difficulty ordering/scheduling U/S elastography  -if elastography reveals no or minimal fibrosis, no further workup or testing prior to proceeding with advanced heart failure therapy  -if elastography reveals moderate or high risk of fibrosis, recommend liver biopsy       -if liver biopsy negative for evidence of cirrhosis, no further workup or testing required       -if liver biopsy confirms diagnosis of cirrhosis, can use the following calculators to risk stratify perioperative mortality based on patient information and planned procedure:            -Olga:                      -30 day mortality: 1.3%                      -90 day mortality: 4.0%                      -180 day mortality: 5.1%                      -90 day decompensation: 5.1%            -FINLEY                      -7 day mortality: 4.5%                      -30 day mortality: 17.3%                      -90 day mortality: 26.5%                      -1 year mortality: 40.4%                      -5 year mortality: 76.1%  -no other plans for anticipated inpatient hepatology interventions    Note incomplete until finalized by attending signature/attestation.    Federico Nixon  GI/Hepatology Fellow    MONDAY-FRIDAY 8AM-5PM:  Pager# [phone] ([facility]) or [phone] ([facility])    NON-URGENT CONSULTS:  Please email [email or web address]  AT NIGHT AND ON WEEKENDS:  Contact on-call GI fellow via answering service ([phone]) from 5pm-8am and on weekends/holidays

## 2023-11-13 NOTE — PROGRESS NOTE ADULT - ASSESSMENT
59 yo M with HFrEF (LVIDd 6.4 cm, LVEF 32%), CAD s/p PCI (2008), HTN, DMT2 (A1c 8.3%) and CVA s/p TPA (2018), recently treated for PNA who initially presented to [facility] via EMS after syncope reportedly requiring defibrilation. Treated for ACS but left AMA to come to [facility]. On arrival ECG with ST depression in lateral leads. Intubated 11/1 for respiratory failure. Found to have COVID. L/RHC 11/1revealing  of LAD and RCA, elevated filling pressures and CI of 1.5 prompting placement of IABP. Extubated 11/3. IABP was weaned to off 11/7, then the following day on 11/8, developed PMVT cardiac arrest with prompt CPR and defibrillation, started on IV Lidocaine and Amio. IABP ultimately replaced on 11/9 for worsening hypotension. TTE 11/11 revealing LV thrombus.     Overall, ACC/AHA Stage D CMP with concerning features and inability to wean of tMCS. AT evaluation launched 11/10, he is ABO A. Currently well supported on IABP at 1:1 and remains on Lidocaine infusion.     Cardiac Studies  ·	11/1123 TTE: LVEF 22% (global), LV thrombus, normal RV size and function, mild MR, trace TR  ·	11/1/23  LHC showed pLAD 70 % stenosis in the ostium portion of the segment and 100 % in-stent restenosis and in mRCA, 100 % stenosis.   ·	11/1/23 RHC; RA 23 Vw 24, PA 52/33/40, PCWP 30 Vw 33, AO 85/66/73, PA sat 54.4%, CO/CI (F) 2.96/1.44, IABP was placed during the cath through R common femoral artery.   ·	11/1/23 TTE: LVIDd 6.4cm , LVEF 32%, regional WMA, grade II DD,  TAPSE 1.7cm, mld MR, trace TR,     Bedside hemodynamics  11/3: IABP 1:1 RA 5; PA 27/12/18; CO/CI 5.6/2.6; MAP 90-100s.  11/4/23 IABP 1:3 CVP 4 PA 37/18 SvO2 83.8 CO/CI 11.2 CI 5.1  (in the setting of fever to 38.3C)  11/5 IABP 1:1 CVP 3 PA 48/27 SvO2 78.4% CO 8.2 CI 3.7   11/1 (IABP 1:1): CVP 1, PA 33/5/16, MvO2 74.3%

## 2023-11-13 NOTE — PROGRESS NOTE ADULT - CRITICAL CARE ATTENDING COMMENT
History of anxiety disorder and CAD s/p PCI  Admitted with cardiogenic shock and respiratory failure in the setting of stent restenosis  Transferred out and had VT/VF cardiac arrest  Intubated during arrest, ROSC achieved, readmitted to CICU  A+Ox3, anxious - continue Ativan 0.5 q6h  Cardiogenic shock on IABP, off milrinone CI 3.1  Maintain IABP and titrate up Bidil for afterload reduction  VT/VF arrest, on Amiodarone PO and Lidocaine infusion, wean lido as tolerated     Continue Lidocaine until he has 4-5 g Amio on board (appx 3 g thus far)  Continue DAPT with ASA/Ticagrelor for coronary disease for prior PCI  SpO2 mid 90s on nasal cannula - wean to room air  Soft diet  Non-oliguric ARIC that is improving, likely ATN from arrest, filling pressures are low - holding diuretics  H/H low but acceptable on Heparin drip for LV thrombus  Afebrile, cultures no growth, no antibiotics   Sugars controlled on Lantus  ANAY Cordis/Armando, IABP, rangel 11/9  fidel cagle

## 2023-11-14 LAB
% GAMMA, URINE: 8.8 % — SIGNIFICANT CHANGE UP
% GAMMA, URINE: 8.8 % — SIGNIFICANT CHANGE UP
ALBUMIN 24H MFR UR ELPH: 16.5 % — SIGNIFICANT CHANGE UP
ALBUMIN 24H MFR UR ELPH: 16.5 % — SIGNIFICANT CHANGE UP
ALBUMIN SERPL ELPH-MCNC: 3.1 G/DL — LOW (ref 3.3–5)
ALBUMIN SERPL ELPH-MCNC: 3.1 G/DL — LOW (ref 3.3–5)
ALP SERPL-CCNC: 62 U/L — SIGNIFICANT CHANGE UP (ref 40–120)
ALP SERPL-CCNC: 62 U/L — SIGNIFICANT CHANGE UP (ref 40–120)
ALPHA1 GLOB 24H MFR UR ELPH: 45 % — SIGNIFICANT CHANGE UP
ALPHA1 GLOB 24H MFR UR ELPH: 45 % — SIGNIFICANT CHANGE UP
ALPHA2 GLOB 24H MFR UR ELPH: 15.5 % — SIGNIFICANT CHANGE UP
ALPHA2 GLOB 24H MFR UR ELPH: 15.5 % — SIGNIFICANT CHANGE UP
ALT FLD-CCNC: 76 U/L — HIGH (ref 10–45)
ALT FLD-CCNC: 76 U/L — HIGH (ref 10–45)
AMPHET UR-MCNC: NEGATIVE NG/ML — SIGNIFICANT CHANGE UP
AMPHET UR-MCNC: NEGATIVE NG/ML — SIGNIFICANT CHANGE UP
ANABASINE UR-MCNC: <2 NG/ML — SIGNIFICANT CHANGE UP
ANABASINE UR-MCNC: <2 NG/ML — SIGNIFICANT CHANGE UP
ANION GAP SERPL CALC-SCNC: 14 MMOL/L — SIGNIFICANT CHANGE UP (ref 5–17)
ANION GAP SERPL CALC-SCNC: 14 MMOL/L — SIGNIFICANT CHANGE UP (ref 5–17)
APTT BLD: 42.1 SEC — HIGH (ref 24.5–35.6)
APTT BLD: 42.1 SEC — HIGH (ref 24.5–35.6)
APTT BLD: 83.4 SEC — HIGH (ref 24.5–35.6)
APTT BLD: 83.4 SEC — HIGH (ref 24.5–35.6)
APTT BLD: 85.7 SEC — HIGH (ref 24.5–35.6)
APTT BLD: 85.7 SEC — HIGH (ref 24.5–35.6)
AST SERPL-CCNC: 41 U/L — HIGH (ref 10–40)
AST SERPL-CCNC: 41 U/L — HIGH (ref 10–40)
B-GLOBULIN 24H MFR UR ELPH: 14.2 % — SIGNIFICANT CHANGE UP
B-GLOBULIN 24H MFR UR ELPH: 14.2 % — SIGNIFICANT CHANGE UP
BARBITURATES UR QL SCN: NEGATIVE NG/ML — SIGNIFICANT CHANGE UP
BARBITURATES UR QL SCN: NEGATIVE NG/ML — SIGNIFICANT CHANGE UP
BARBITURATES UR-MCNC: NEGATIVE NG/ML — SIGNIFICANT CHANGE UP
BARBITURATES UR-MCNC: NEGATIVE NG/ML — SIGNIFICANT CHANGE UP
BASE EXCESS BLDV CALC-SCNC: -3.5 MMOL/L — LOW (ref -2–3)
BASE EXCESS BLDV CALC-SCNC: -3.5 MMOL/L — LOW (ref -2–3)
BASOPHILS # BLD AUTO: 0.04 K/UL — SIGNIFICANT CHANGE UP (ref 0–0.2)
BASOPHILS # BLD AUTO: 0.04 K/UL — SIGNIFICANT CHANGE UP (ref 0–0.2)
BASOPHILS NFR BLD AUTO: 0.4 % — SIGNIFICANT CHANGE UP (ref 0–2)
BASOPHILS NFR BLD AUTO: 0.4 % — SIGNIFICANT CHANGE UP (ref 0–2)
BENZODIAZ UR-MCNC: NEGATIVE NG/ML — SIGNIFICANT CHANGE UP
BENZODIAZ UR-MCNC: NEGATIVE NG/ML — SIGNIFICANT CHANGE UP
BILIRUB SERPL-MCNC: 0.5 MG/DL — SIGNIFICANT CHANGE UP (ref 0.2–1.2)
BILIRUB SERPL-MCNC: 0.5 MG/DL — SIGNIFICANT CHANGE UP (ref 0.2–1.2)
BUN SERPL-MCNC: 35 MG/DL — HIGH (ref 7–23)
BUN SERPL-MCNC: 35 MG/DL — HIGH (ref 7–23)
CA-I SERPL-SCNC: 1.27 MMOL/L — SIGNIFICANT CHANGE UP (ref 1.15–1.33)
CA-I SERPL-SCNC: 1.27 MMOL/L — SIGNIFICANT CHANGE UP (ref 1.15–1.33)
CALCIUM SERPL-MCNC: 9.4 MG/DL — SIGNIFICANT CHANGE UP (ref 8.4–10.5)
CALCIUM SERPL-MCNC: 9.4 MG/DL — SIGNIFICANT CHANGE UP (ref 8.4–10.5)
CHLORIDE BLDV-SCNC: 106 MMOL/L — SIGNIFICANT CHANGE UP (ref 96–108)
CHLORIDE BLDV-SCNC: 106 MMOL/L — SIGNIFICANT CHANGE UP (ref 96–108)
CHLORIDE SERPL-SCNC: 106 MMOL/L — SIGNIFICANT CHANGE UP (ref 96–108)
CHLORIDE SERPL-SCNC: 106 MMOL/L — SIGNIFICANT CHANGE UP (ref 96–108)
CO2 BLDV-SCNC: 22 MMOL/L — SIGNIFICANT CHANGE UP (ref 22–26)
CO2 BLDV-SCNC: 22 MMOL/L — SIGNIFICANT CHANGE UP (ref 22–26)
CO2 SERPL-SCNC: 17 MMOL/L — LOW (ref 22–31)
CO2 SERPL-SCNC: 17 MMOL/L — LOW (ref 22–31)
COCAINE METAB.OTHER UR-MCNC: NEGATIVE NG/ML — SIGNIFICANT CHANGE UP
COCAINE METAB.OTHER UR-MCNC: NEGATIVE NG/ML — SIGNIFICANT CHANGE UP
COLLECT DURATION TIME UR: 24 HR — SIGNIFICANT CHANGE UP
COLLECT DURATION TIME UR: 24 HR — SIGNIFICANT CHANGE UP
COTININE UR-MCNC: <5 NG/ML — SIGNIFICANT CHANGE UP
COTININE UR-MCNC: <5 NG/ML — SIGNIFICANT CHANGE UP
CREAT SERPL-MCNC: 1.8 MG/DL — HIGH (ref 0.5–1.3)
CREAT SERPL-MCNC: 1.8 MG/DL — HIGH (ref 0.5–1.3)
CREATININE, URINE THERAPEUTIC: 34.9 MG/DL — SIGNIFICANT CHANGE UP (ref 20–300)
CREATININE, URINE THERAPEUTIC: 34.9 MG/DL — SIGNIFICANT CHANGE UP (ref 20–300)
EGFR: 43 ML/MIN/1.73M2 — LOW
EGFR: 43 ML/MIN/1.73M2 — LOW
EOSINOPHIL # BLD AUTO: 0.27 K/UL — SIGNIFICANT CHANGE UP (ref 0–0.5)
EOSINOPHIL # BLD AUTO: 0.27 K/UL — SIGNIFICANT CHANGE UP (ref 0–0.5)
EOSINOPHIL NFR BLD AUTO: 2.8 % — SIGNIFICANT CHANGE UP (ref 0–6)
EOSINOPHIL NFR BLD AUTO: 2.8 % — SIGNIFICANT CHANGE UP (ref 0–6)
FENTANYL RESULT, UR: NEGATIVE NG/ML — SIGNIFICANT CHANGE UP
FENTANYL RESULT, UR: NEGATIVE NG/ML — SIGNIFICANT CHANGE UP
FENTANYL UR QL SCN: NEGATIVE NG/ML — SIGNIFICANT CHANGE UP
FENTANYL UR QL SCN: NEGATIVE NG/ML — SIGNIFICANT CHANGE UP
GAMMA INTERFERON BACKGROUND BLD IA-ACNC: 0.02 IU/ML — SIGNIFICANT CHANGE UP
GAMMA INTERFERON BACKGROUND BLD IA-ACNC: 0.02 IU/ML — SIGNIFICANT CHANGE UP
GAS PNL BLDA: SIGNIFICANT CHANGE UP
GAS PNL BLDA: SIGNIFICANT CHANGE UP
GAS PNL BLDV: 134 MMOL/L — LOW (ref 136–145)
GAS PNL BLDV: 134 MMOL/L — LOW (ref 136–145)
GAS PNL BLDV: SIGNIFICANT CHANGE UP
GLUCOSE BLDC GLUCOMTR-MCNC: 127 MG/DL — HIGH (ref 70–99)
GLUCOSE BLDC GLUCOMTR-MCNC: 127 MG/DL — HIGH (ref 70–99)
GLUCOSE BLDC GLUCOMTR-MCNC: 163 MG/DL — HIGH (ref 70–99)
GLUCOSE BLDC GLUCOMTR-MCNC: 163 MG/DL — HIGH (ref 70–99)
GLUCOSE BLDC GLUCOMTR-MCNC: 172 MG/DL — HIGH (ref 70–99)
GLUCOSE BLDC GLUCOMTR-MCNC: 172 MG/DL — HIGH (ref 70–99)
GLUCOSE BLDC GLUCOMTR-MCNC: 205 MG/DL — HIGH (ref 70–99)
GLUCOSE BLDC GLUCOMTR-MCNC: 205 MG/DL — HIGH (ref 70–99)
GLUCOSE BLDV-MCNC: 86 MG/DL — SIGNIFICANT CHANGE UP (ref 70–99)
GLUCOSE BLDV-MCNC: 86 MG/DL — SIGNIFICANT CHANGE UP (ref 70–99)
GLUCOSE SERPL-MCNC: 127 MG/DL — HIGH (ref 70–99)
GLUCOSE SERPL-MCNC: 127 MG/DL — HIGH (ref 70–99)
HCO3 BLDV-SCNC: 21 MMOL/L — LOW (ref 22–29)
HCO3 BLDV-SCNC: 21 MMOL/L — LOW (ref 22–29)
HCT VFR BLD CALC: 34.2 % — LOW (ref 39–50)
HCT VFR BLD CALC: 34.2 % — LOW (ref 39–50)
HCT VFR BLDA CALC: 35 % — LOW (ref 39–51)
HCT VFR BLDA CALC: 35 % — LOW (ref 39–51)
HEV AB FLD QL: POSITIVE
HEV AB FLD QL: POSITIVE
HGB BLD CALC-MCNC: 11.8 G/DL — LOW (ref 12.6–17.4)
HGB BLD CALC-MCNC: 11.8 G/DL — LOW (ref 12.6–17.4)
HGB BLD-MCNC: 11.5 G/DL — LOW (ref 13–17)
HGB BLD-MCNC: 11.5 G/DL — LOW (ref 13–17)
HOROWITZ INDEX BLDV+IHG-RTO: 21 — SIGNIFICANT CHANGE UP
HOROWITZ INDEX BLDV+IHG-RTO: 21 — SIGNIFICANT CHANGE UP
IMM GRANULOCYTES NFR BLD AUTO: 0.5 % — SIGNIFICANT CHANGE UP (ref 0–0.9)
IMM GRANULOCYTES NFR BLD AUTO: 0.5 % — SIGNIFICANT CHANGE UP (ref 0–0.9)
INR BLD: 1.34 RATIO — HIGH (ref 0.85–1.18)
INR BLD: 1.34 RATIO — HIGH (ref 0.85–1.18)
INR BLD: 1.37 RATIO — HIGH (ref 0.85–1.18)
INR BLD: 1.37 RATIO — HIGH (ref 0.85–1.18)
INTERPRETATION 24H UR IFE-IMP: SIGNIFICANT CHANGE UP
INTERPRETATION 24H UR IFE-IMP: SIGNIFICANT CHANGE UP
LACTATE BLDV-MCNC: 1.3 MMOL/L — SIGNIFICANT CHANGE UP (ref 0.5–2)
LACTATE BLDV-MCNC: 1.3 MMOL/L — SIGNIFICANT CHANGE UP (ref 0.5–2)
LYMPHOCYTES # BLD AUTO: 1.43 K/UL — SIGNIFICANT CHANGE UP (ref 1–3.3)
LYMPHOCYTES # BLD AUTO: 1.43 K/UL — SIGNIFICANT CHANGE UP (ref 1–3.3)
LYMPHOCYTES # BLD AUTO: 14.9 % — SIGNIFICANT CHANGE UP (ref 13–44)
LYMPHOCYTES # BLD AUTO: 14.9 % — SIGNIFICANT CHANGE UP (ref 13–44)
Lab: SIGNIFICANT CHANGE UP
Lab: SIGNIFICANT CHANGE UP
M PROTEIN 24H UR ELPH-MRATE: 0 % — SIGNIFICANT CHANGE UP
M PROTEIN 24H UR ELPH-MRATE: 0 % — SIGNIFICANT CHANGE UP
M PROTEIN 24H UR ELPH-MRATE: 0 MG/24HR — SIGNIFICANT CHANGE UP (ref 0–0)
M PROTEIN 24H UR ELPH-MRATE: 0 MG/24HR — SIGNIFICANT CHANGE UP (ref 0–0)
M PROTEIN 24H UR ELPH-MRATE: 0 MG/DL — SIGNIFICANT CHANGE UP
M PROTEIN 24H UR ELPH-MRATE: 0 MG/DL — SIGNIFICANT CHANGE UP
M TB IFN-G BLD-IMP: NEGATIVE — SIGNIFICANT CHANGE UP
M TB IFN-G BLD-IMP: NEGATIVE — SIGNIFICANT CHANGE UP
M TB IFN-G CD4+ BCKGRND COR BLD-ACNC: 0 IU/ML — SIGNIFICANT CHANGE UP
M TB IFN-G CD4+ BCKGRND COR BLD-ACNC: 0 IU/ML — SIGNIFICANT CHANGE UP
M TB IFN-G CD4+CD8+ BCKGRND COR BLD-ACNC: -0.01 IU/ML — SIGNIFICANT CHANGE UP
M TB IFN-G CD4+CD8+ BCKGRND COR BLD-ACNC: -0.01 IU/ML — SIGNIFICANT CHANGE UP
MAGNESIUM SERPL-MCNC: 2.1 MG/DL — SIGNIFICANT CHANGE UP (ref 1.6–2.6)
MAGNESIUM SERPL-MCNC: 2.1 MG/DL — SIGNIFICANT CHANGE UP (ref 1.6–2.6)
MCHC RBC-ENTMCNC: 29.6 PG — SIGNIFICANT CHANGE UP (ref 27–34)
MCHC RBC-ENTMCNC: 29.6 PG — SIGNIFICANT CHANGE UP (ref 27–34)
MCHC RBC-ENTMCNC: 33.6 GM/DL — SIGNIFICANT CHANGE UP (ref 32–36)
MCHC RBC-ENTMCNC: 33.6 GM/DL — SIGNIFICANT CHANGE UP (ref 32–36)
MCV RBC AUTO: 87.9 FL — SIGNIFICANT CHANGE UP (ref 80–100)
MCV RBC AUTO: 87.9 FL — SIGNIFICANT CHANGE UP (ref 80–100)
METHADONE UR-MCNC: NEGATIVE NG/ML — SIGNIFICANT CHANGE UP
METHADONE UR-MCNC: NEGATIVE NG/ML — SIGNIFICANT CHANGE UP
MONOCYTES # BLD AUTO: 0.89 K/UL — SIGNIFICANT CHANGE UP (ref 0–0.9)
MONOCYTES # BLD AUTO: 0.89 K/UL — SIGNIFICANT CHANGE UP (ref 0–0.9)
MONOCYTES NFR BLD AUTO: 9.3 % — SIGNIFICANT CHANGE UP (ref 2–14)
MONOCYTES NFR BLD AUTO: 9.3 % — SIGNIFICANT CHANGE UP (ref 2–14)
NEUTROPHILS # BLD AUTO: 6.94 K/UL — SIGNIFICANT CHANGE UP (ref 1.8–7.4)
NEUTROPHILS # BLD AUTO: 6.94 K/UL — SIGNIFICANT CHANGE UP (ref 1.8–7.4)
NEUTROPHILS NFR BLD AUTO: 72.1 % — SIGNIFICANT CHANGE UP (ref 43–77)
NEUTROPHILS NFR BLD AUTO: 72.1 % — SIGNIFICANT CHANGE UP (ref 43–77)
NICOTINE UR-MCNC: <5 NG/ML — SIGNIFICANT CHANGE UP
NICOTINE UR-MCNC: <5 NG/ML — SIGNIFICANT CHANGE UP
NORNICOTINE UR-MCNC: <2 NG/ML — SIGNIFICANT CHANGE UP
NORNICOTINE UR-MCNC: <2 NG/ML — SIGNIFICANT CHANGE UP
NRBC # BLD: 0 /100 WBCS — SIGNIFICANT CHANGE UP (ref 0–0)
NRBC # BLD: 0 /100 WBCS — SIGNIFICANT CHANGE UP (ref 0–0)
OPIATES UR-MCNC: NEGATIVE NG/ML — SIGNIFICANT CHANGE UP
OPIATES UR-MCNC: NEGATIVE NG/ML — SIGNIFICANT CHANGE UP
OXYCODONE UR QL SCN: NEGATIVE NG/ML — SIGNIFICANT CHANGE UP
OXYCODONE UR QL SCN: NEGATIVE NG/ML — SIGNIFICANT CHANGE UP
PCO2 BLDV: 37 MMHG — LOW (ref 42–55)
PCO2 BLDV: 37 MMHG — LOW (ref 42–55)
PCP UR-MCNC: NEGATIVE NG/ML — SIGNIFICANT CHANGE UP
PCP UR-MCNC: NEGATIVE NG/ML — SIGNIFICANT CHANGE UP
PH BLDV: 7.37 — SIGNIFICANT CHANGE UP (ref 7.32–7.43)
PH BLDV: 7.37 — SIGNIFICANT CHANGE UP (ref 7.32–7.43)
PH, URINE RESULT: 4.5 — SIGNIFICANT CHANGE UP (ref 4.5–8.9)
PH, URINE RESULT: 4.5 — SIGNIFICANT CHANGE UP (ref 4.5–8.9)
PHOSPHATE SERPL-MCNC: 3 MG/DL — SIGNIFICANT CHANGE UP (ref 2.5–4.5)
PHOSPHATE SERPL-MCNC: 3 MG/DL — SIGNIFICANT CHANGE UP (ref 2.5–4.5)
PLATELET # BLD AUTO: 217 K/UL — SIGNIFICANT CHANGE UP (ref 150–400)
PLATELET # BLD AUTO: 217 K/UL — SIGNIFICANT CHANGE UP (ref 150–400)
PO2 BLDV: 45 MMHG — SIGNIFICANT CHANGE UP (ref 25–45)
PO2 BLDV: 45 MMHG — SIGNIFICANT CHANGE UP (ref 25–45)
POTASSIUM BLDV-SCNC: 4.1 MMOL/L — SIGNIFICANT CHANGE UP (ref 3.5–5.1)
POTASSIUM BLDV-SCNC: 4.1 MMOL/L — SIGNIFICANT CHANGE UP (ref 3.5–5.1)
POTASSIUM SERPL-MCNC: 4.2 MMOL/L — SIGNIFICANT CHANGE UP (ref 3.5–5.3)
POTASSIUM SERPL-MCNC: 4.2 MMOL/L — SIGNIFICANT CHANGE UP (ref 3.5–5.3)
POTASSIUM SERPL-SCNC: 4.2 MMOL/L — SIGNIFICANT CHANGE UP (ref 3.5–5.3)
POTASSIUM SERPL-SCNC: 4.2 MMOL/L — SIGNIFICANT CHANGE UP (ref 3.5–5.3)
PROT ?TM UR-MCNC: 24 MG/DL — HIGH (ref 0–12)
PROT ?TM UR-MCNC: 24 MG/DL — HIGH (ref 0–12)
PROT PATTERN 24H UR ELPH-IMP: SIGNIFICANT CHANGE UP
PROT PATTERN 24H UR ELPH-IMP: SIGNIFICANT CHANGE UP
PROT SERPL-MCNC: 7.3 G/DL — SIGNIFICANT CHANGE UP (ref 6–8.3)
PROT SERPL-MCNC: 7.3 G/DL — SIGNIFICANT CHANGE UP (ref 6–8.3)
PROTEIN QUANT CALC, URINE: 684 MG/24 H — HIGH (ref 50–100)
PROTEIN QUANT CALC, URINE: 684 MG/24 H — HIGH (ref 50–100)
PROTHROM AB SERPL-ACNC: 14 SEC — HIGH (ref 9.5–13)
PROTHROM AB SERPL-ACNC: 14 SEC — HIGH (ref 9.5–13)
PROTHROM AB SERPL-ACNC: 14.3 SEC — HIGH (ref 9.5–13)
PROTHROM AB SERPL-ACNC: 14.3 SEC — HIGH (ref 9.5–13)
QUANT TB PLUS MITOGEN MINUS NIL: 0.93 IU/ML — SIGNIFICANT CHANGE UP
QUANT TB PLUS MITOGEN MINUS NIL: 0.93 IU/ML — SIGNIFICANT CHANGE UP
RBC # BLD: 3.89 M/UL — LOW (ref 4.2–5.8)
RBC # BLD: 3.89 M/UL — LOW (ref 4.2–5.8)
RBC # FLD: 14.2 % — SIGNIFICANT CHANGE UP (ref 10.3–14.5)
RBC # FLD: 14.2 % — SIGNIFICANT CHANGE UP (ref 10.3–14.5)
SAO2 % BLDV: 74.2 % — SIGNIFICANT CHANGE UP (ref 67–88)
SAO2 % BLDV: 74.2 % — SIGNIFICANT CHANGE UP (ref 67–88)
SODIUM SERPL-SCNC: 137 MMOL/L — SIGNIFICANT CHANGE UP (ref 135–145)
SODIUM SERPL-SCNC: 137 MMOL/L — SIGNIFICANT CHANGE UP (ref 135–145)
THC UR QL: NEGATIVE NG/ML — SIGNIFICANT CHANGE UP
THC UR QL: NEGATIVE NG/ML — SIGNIFICANT CHANGE UP
TOTAL VOLUME - 24 HOUR: 2850 ML — SIGNIFICANT CHANGE UP
TOTAL VOLUME - 24 HOUR: 2850 ML — SIGNIFICANT CHANGE UP
URINE CREATININE CALCULATION: 2.4 G/24 H — HIGH (ref 1–2)
URINE CREATININE CALCULATION: 2.4 G/24 H — HIGH (ref 1–2)
WBC # BLD: 9.62 K/UL — SIGNIFICANT CHANGE UP (ref 3.8–10.5)
WBC # BLD: 9.62 K/UL — SIGNIFICANT CHANGE UP (ref 3.8–10.5)
WBC # FLD AUTO: 9.62 K/UL — SIGNIFICANT CHANGE UP (ref 3.8–10.5)
WBC # FLD AUTO: 9.62 K/UL — SIGNIFICANT CHANGE UP (ref 3.8–10.5)

## 2023-11-14 PROCEDURE — 99232 SBSQ HOSP IP/OBS MODERATE 35: CPT

## 2023-11-14 PROCEDURE — 99222 1ST HOSP IP/OBS MODERATE 55: CPT

## 2023-11-14 PROCEDURE — 93970 EXTREMITY STUDY: CPT | Mod: 26

## 2023-11-14 PROCEDURE — 99292 CRITICAL CARE ADDL 30 MIN: CPT

## 2023-11-14 PROCEDURE — 93926 LOWER EXTREMITY STUDY: CPT | Mod: 26,RT

## 2023-11-14 PROCEDURE — 99291 CRITICAL CARE FIRST HOUR: CPT

## 2023-11-14 PROCEDURE — 93010 ELECTROCARDIOGRAM REPORT: CPT

## 2023-11-14 PROCEDURE — 99222 1ST HOSP IP/OBS MODERATE 55: CPT | Mod: GC

## 2023-11-14 PROCEDURE — 71045 X-RAY EXAM CHEST 1 VIEW: CPT | Mod: 26

## 2023-11-14 RX ORDER — ACETAMINOPHEN 500 MG
650 TABLET ORAL EVERY 6 HOURS
Refills: 0 | Status: DISCONTINUED | OUTPATIENT
Start: 2023-11-14 | End: 2023-11-14

## 2023-11-14 RX ORDER — ACETAMINOPHEN 500 MG
1000 TABLET ORAL ONCE
Refills: 0 | Status: COMPLETED | OUTPATIENT
Start: 2023-11-14 | End: 2023-11-14

## 2023-11-14 RX ORDER — ACETAMINOPHEN 500 MG
975 TABLET ORAL EVERY 6 HOURS
Refills: 0 | Status: DISCONTINUED | OUTPATIENT
Start: 2023-11-14 | End: 2023-11-15

## 2023-11-14 RX ADMIN — CHLORHEXIDINE GLUCONATE 1 APPLICATION(S): 213 SOLUTION TOPICAL at 21:08

## 2023-11-14 RX ADMIN — ISOSORBIDE DINITRATE 40 MILLIGRAM(S): 5 TABLET ORAL at 17:17

## 2023-11-14 RX ADMIN — BUDESONIDE AND FORMOTEROL FUMARATE DIHYDRATE 2 PUFF(S): 160; 4.5 AEROSOL RESPIRATORY (INHALATION) at 21:16

## 2023-11-14 RX ADMIN — Medication 1000 MILLIGRAM(S): at 04:36

## 2023-11-14 RX ADMIN — ISOSORBIDE DINITRATE 40 MILLIGRAM(S): 5 TABLET ORAL at 11:45

## 2023-11-14 RX ADMIN — HEPARIN SODIUM 14 UNIT(S)/HR: 5000 INJECTION INTRAVENOUS; SUBCUTANEOUS at 04:00

## 2023-11-14 RX ADMIN — Medication 100 MILLIGRAM(S): at 06:20

## 2023-11-14 RX ADMIN — Medication 650 MILLIGRAM(S): at 11:45

## 2023-11-14 RX ADMIN — Medication 100 MILLIGRAM(S): at 13:32

## 2023-11-14 RX ADMIN — Medication 650 MILLIGRAM(S): at 12:15

## 2023-11-14 RX ADMIN — Medication 100 MILLIGRAM(S): at 21:08

## 2023-11-14 RX ADMIN — Medication 400 MILLIGRAM(S): at 03:54

## 2023-11-14 RX ADMIN — Medication 1: at 17:18

## 2023-11-14 RX ADMIN — HEPARIN SODIUM 12 UNIT(S)/HR: 5000 INJECTION INTRAVENOUS; SUBCUTANEOUS at 19:00

## 2023-11-14 RX ADMIN — Medication 1: at 09:40

## 2023-11-14 RX ADMIN — BUDESONIDE AND FORMOTEROL FUMARATE DIHYDRATE 2 PUFF(S): 160; 4.5 AEROSOL RESPIRATORY (INHALATION) at 09:55

## 2023-11-14 RX ADMIN — Medication 8 UNIT(S): at 17:18

## 2023-11-14 RX ADMIN — INSULIN GLARGINE 8 UNIT(S): 100 INJECTION, SOLUTION SUBCUTANEOUS at 21:07

## 2023-11-14 RX ADMIN — AMIODARONE HYDROCHLORIDE 400 MILLIGRAM(S): 400 TABLET ORAL at 06:20

## 2023-11-14 RX ADMIN — ATORVASTATIN CALCIUM 80 MILLIGRAM(S): 80 TABLET, FILM COATED ORAL at 21:08

## 2023-11-14 RX ADMIN — ISOSORBIDE DINITRATE 40 MILLIGRAM(S): 5 TABLET ORAL at 06:20

## 2023-11-14 RX ADMIN — Medication 2: at 13:32

## 2023-11-14 RX ADMIN — AMIODARONE HYDROCHLORIDE 400 MILLIGRAM(S): 400 TABLET ORAL at 13:33

## 2023-11-14 RX ADMIN — Medication 8 UNIT(S): at 13:31

## 2023-11-14 RX ADMIN — SODIUM CHLORIDE 4 MILLILITER(S): 9 INJECTION INTRAMUSCULAR; INTRAVENOUS; SUBCUTANEOUS at 21:17

## 2023-11-14 RX ADMIN — AMIODARONE HYDROCHLORIDE 400 MILLIGRAM(S): 400 TABLET ORAL at 21:08

## 2023-11-14 RX ADMIN — HEPARIN SODIUM 13 UNIT(S)/HR: 5000 INJECTION INTRAVENOUS; SUBCUTANEOUS at 10:48

## 2023-11-14 RX ADMIN — HEPARIN SODIUM 12 UNIT(S)/HR: 5000 INJECTION INTRAVENOUS; SUBCUTANEOUS at 19:16

## 2023-11-14 RX ADMIN — Medication 81 MILLIGRAM(S): at 11:45

## 2023-11-14 RX ADMIN — CHLORHEXIDINE GLUCONATE 1 APPLICATION(S): 213 SOLUTION TOPICAL at 03:35

## 2023-11-14 RX ADMIN — SPIRONOLACTONE 25 MILLIGRAM(S): 25 TABLET, FILM COATED ORAL at 06:20

## 2023-11-14 NOTE — CONSULT NOTE ADULT - NSCONSULTADDITIONALINFOA_GEN_ALL_CORE
I reviewed the patients imaging, medical records, reports. With the patient  I discussed the risks, benefits and alternatives to both LVAD and transplant surgery. The pros, cons and risks associated with both LVAD implant and transplant surgery were discussed in detail.

## 2023-11-14 NOTE — CONSULT NOTE ADULT - PROBLEM SELECTOR RECOMMENDATION 9
- IABP at 1:1. On AC with Heparin infusion (also for LV thrombus)  - per HF remains on Hydralazine, ISDN and Aldactone   - undergoing advance therapy eval for both LVAD/transplant  - ABO: A+  - A1c: 8.3

## 2023-11-14 NOTE — CONSULT NOTE ADULT - SUBJECTIVE AND OBJECTIVE BOX
HPI      PAST MEDICAL & SURGICAL HISTORY:  HTN (hypertension)      CAD (coronary artery disease)  ; stent      Intracranial hemorrhage        Respiratory arrest        Myocardial infarction, unspecified MI type, unspecified artery      History of coronary artery stent placement        Social History:        Allergies    penicillins (Unknown)    Intolerances            Home Medications:  Albuterol (Eqv-ProAir HFA) 90 mcg/inh inhalation aerosol: 2 puff(s) inhaled every 6 hours as needed (2023 11:15)  carvedilol 25 mg oral tablet: 1 tab(s) orally 2 times a day (2023 11:06)  clopidogrel 75 mg oral tablet: 1 tab(s) orally once a day (2023 11:11)  dapagliflozin 10 mg oral tablet: 1 tab(s) orally once a day (in the morning) (2023 11:10)  hydroCHLOROthiazide 25 mg oral tablet: 1 tab(s) orally once a day (in the morning) (2023 11:06)  ipratropium-albuterol 0.5 mg-2.5 mg/3 mL inhalation solution: 3 milliliter(s) by nebulizer once a day (2023 11:13)  levoFLOXacin 500 mg oral tablet: 1 tab(s) orally once a day for 10 days (2023 11:09)  losartan 25 mg oral tablet: 1 tab(s) orally once a day (2023 11:06)  montelukast 10 mg oral tablet: 1 tab(s) orally once a day (2023 11:06)  Ozempic 4 mg/3 mL (1 mg dose) subcutaneous solution: 1 milligram(s) subcutaneously once a week (2023 11:14)  Trelegy Ellipta 100 mcg-62.5 mcg-25 mcg/inh inhalation powder: 2 puff(s) inhaled 2 times a day NOTE: As per Pharmacy, last filled 10/22 (2023 11:10)        Medications:  acetaminophen     Tablet .. 650 milliGRAM(s) Oral every 6 hours PRN  aMIOdarone    Tablet 400 milliGRAM(s) Oral every 8 hours  aMIOdarone    Tablet   Oral   aspirin  chewable 81 milliGRAM(s) Oral daily  atorvastatin 80 milliGRAM(s) Oral at bedtime  benzonatate 100 milliGRAM(s) Oral once  budesonide  80 MICROgram(s)/formoterol 4.5 MICROgram(s) Inhaler 2 Puff(s) Inhalation two times a day  chlorhexidine 2% Cloths 1 Application(s) Topical daily  dextrose 50% Injectable 25 Gram(s) IV Push once  dextrose 50% Injectable 12.5 Gram(s) IV Push once  guaiFENesin Oral Liquid (Sugar-Free) 100 milliGRAM(s) Oral every 6 hours PRN  heparin  Infusion 1600 Unit(s)/Hr IV Continuous <Continuous>  hydrALAZINE 100 milliGRAM(s) Oral three times a day  insulin glargine Injectable (LANTUS) 8 Unit(s) SubCutaneous at bedtime  insulin lispro (ADMELOG) corrective regimen sliding scale   SubCutaneous three times a day before meals  insulin lispro Injectable (ADMELOG) 8 Unit(s) SubCutaneous three times a day before meals  isosorbide   dinitrate Tablet (ISORDIL) 40 milliGRAM(s) Oral three times a day  lidocaine   4% Patch 1 Patch Transdermal every 24 hours  LORazepam     Tablet 0.5 milliGRAM(s) Oral every 6 hours PRN  sodium chloride 3%  Inhalation 4 milliLiter(s) Inhalation every 12 hours  spironolactone 25 milliGRAM(s) Oral daily      ICU Vital Signs Last 24 Hrs  T(C): 36.7 (2023 03:00), Max: 37.3 (2023 15:00)  T(F): 98.1 (2023 03:00), Max: 99.1 (2023 15:00)  HR: 99 (2023 12:00) (79 - 99)  BP: 106/57 (2023 14:00) (106/57 - 106/57)  BP(mean): 74 (2023 14:00) (74 - 74)  ABP: 114/67 (2023 12:00) (108/57 - 137/74)  ABP(mean): 93 (2023 12:00) (84 - 110)  RR: 24 (2023 12:00) (18 - 32)  SpO2: 94% (2023 12:00) (92% - 97%)    O2 Parameters below as of 2023 12:00  Patient On (Oxygen Delivery Method): room air        Weight in k.3 (11-14 @ 06:00)    I&O's Summary    2023 07:  -  2023 07:00  --------------------------------------------------------  IN: 2134.2 mL / OUT: 2100 mL / NET: 34.2 mL    2023 07:  -  2023 12:47  --------------------------------------------------------  IN: 188 mL / OUT: 0 mL / NET: 188 mL        Physical Exam  General: No distress. Comfortable.  Neck: JVP not elevated  Chest: Clear to auscultation bilaterally  CV: Normal S1 and S2.   Abdomen: Soft, non-distended, non-tender  Skin: No rashes or skin breakdown  Neurology: Alert and oriented times three.    Labs:                        11.5   9.62  )-----------( 217      ( 2023 01:56 )             34.2         137  |  106  |  35<H>  ----------------------------<  127<H>  4.2   |  17<L>  |  1.80<H>    Ca    9.4      2023 01:56  Phos  3.0       Mg     2.1         TPro  7.3  /  Alb  3.1<L>  /  TBili  0.5  /  DBili  x   /  AST  41<H>  /  ALT  76<H>  /  AlkPhos  62      PT/INR - ( 2023 09:45 )   PT: 14.3 sec;   INR: 1.37 ratio         PTT - ( 2023 09:45 )  PTT:83.4 sec        Oxygen Saturation, Mixed: 74.3 ( @ 04:35)  Oxygen Saturation, Mixed: 78.7 ( @ 17:48)  Oxygen Saturation, Mixed: 78.6 ( @ 00:45)  Oxygen Saturation, Mixed: 78.2 ( @ 17:50)           HPI  58 yrs old male w/ hx of HFrEF (LVIDd 6.4 cm, LVEF 32%), CAD s/p PCI (), HTN, DMT2 (A1c 8.3%) and CVA s/p TPA (), recently treated for PNA who initially presented to [facility] via EMS after syncope reportedly requiring defibrilation. Treated for ACS but left AMA to come to [facility]. On arrival ECG with ST depression in lateral leads. Intubated  for respiratory failure. Found to have COVID. L/RHC evealing  of LAD and RCA, elevated filling pressures and CI of 1.5 prompting placement of IABP. Extubated 11/3. IABP was weaned to off , then the following day on , developed PMVT cardiac arrest with prompt CPR and defibrillation, started on IV Lidocaine and Amio. IABP ultimately replaced on  for worsening hypotension. TTE  revealing LV thrombus, currently he is on heparin drip. 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 heart transplant.    PAST MEDICAL & SURGICAL HISTORY:  HTN (hypertension)      CAD (coronary artery disease)  ; stent      Intracranial hemorrhage        Respiratory arrest        Myocardial infarction, unspecified MI type, unspecified artery      History of coronary artery stent placement        Social History:        Allergies    penicillins (Unknown)    Intolerances            Home Medications:  Albuterol (Eqv-ProAir HFA) 90 mcg/inh inhalation aerosol: 2 puff(s) inhaled every 6 hours as needed (2023 11:15)  carvedilol 25 mg oral tablet: 1 tab(s) orally 2 times a day (2023 11:06)  clopidogrel 75 mg oral tablet: 1 tab(s) orally once a day (2023 11:11)  dapagliflozin 10 mg oral tablet: 1 tab(s) orally once a day (in the morning) (2023 11:10)  hydroCHLOROthiazide 25 mg oral tablet: 1 tab(s) orally once a day (in the morning) (2023 11:06)  ipratropium-albuterol 0.5 mg-2.5 mg/3 mL inhalation solution: 3 milliliter(s) by nebulizer once a day (2023 11:13)  levoFLOXacin 500 mg oral tablet: 1 tab(s) orally once a day for 10 days (2023 11:09)  losartan 25 mg oral tablet: 1 tab(s) orally once a day (2023 11:06)  montelukast 10 mg oral tablet: 1 tab(s) orally once a day (2023 11:06)  Ozempic 4 mg/3 mL (1 mg dose) subcutaneous solution: 1 milligram(s) subcutaneously once a week (2023 11:14)  Trelegy Ellipta 100 mcg-62.5 mcg-25 mcg/inh inhalation powder: 2 puff(s) inhaled 2 times a day NOTE: As per Pharmacy, last filled 10/22 (2023 11:10)        Medications:  acetaminophen     Tablet .. 650 milliGRAM(s) Oral every 6 hours PRN  aMIOdarone    Tablet 400 milliGRAM(s) Oral every 8 hours  aMIOdarone    Tablet   Oral   aspirin  chewable 81 milliGRAM(s) Oral daily  atorvastatin 80 milliGRAM(s) Oral at bedtime  benzonatate 100 milliGRAM(s) Oral once  budesonide  80 MICROgram(s)/formoterol 4.5 MICROgram(s) Inhaler 2 Puff(s) Inhalation two times a day  chlorhexidine 2% Cloths 1 Application(s) Topical daily  dextrose 50% Injectable 25 Gram(s) IV Push once  dextrose 50% Injectable 12.5 Gram(s) IV Push once  guaiFENesin Oral Liquid (Sugar-Free) 100 milliGRAM(s) Oral every 6 hours PRN  heparin  Infusion 1600 Unit(s)/Hr IV Continuous <Continuous>  hydrALAZINE 100 milliGRAM(s) Oral three times a day  insulin glargine Injectable (LANTUS) 8 Unit(s) SubCutaneous at bedtime  insulin lispro (ADMELOG) corrective regimen sliding scale   SubCutaneous three times a day before meals  insulin lispro Injectable (ADMELOG) 8 Unit(s) SubCutaneous three times a day before meals  isosorbide   dinitrate Tablet (ISORDIL) 40 milliGRAM(s) Oral three times a day  lidocaine   4% Patch 1 Patch Transdermal every 24 hours  LORazepam     Tablet 0.5 milliGRAM(s) Oral every 6 hours PRN  sodium chloride 3%  Inhalation 4 milliLiter(s) Inhalation every 12 hours  spironolactone 25 milliGRAM(s) Oral daily      ICU Vital Signs Last 24 Hrs  T(C): 36.7 (2023 03:00), Max: 37.3 (2023 15:00)  T(F): 98.1 (2023 03:00), Max: 99.1 (2023 15:00)  HR: 99 (2023 12:00) (79 - 99)  BP: 106/57 (2023 14:00) (106/57 - 106/57)  BP(mean): 74 (:00) (74 - 74)  ABP: 114/67 (:00) (108/57 - 137/74)  ABP(mean): 93 (2023 12:00) (84 - 110)  RR: 24 (:00) (18 - 32)  SpO2: 94% (:00) (92% - 97%)    O2 Parameters below as of 2023 12:00  Patient On (Oxygen Delivery Method): room air        Weight in k.3 (-14 @ 06:00)    I&O's Summary    2023 07:  -  2023 07:00  --------------------------------------------------------  IN: 2134.2 mL / OUT: 2100 mL / NET: 34.2 mL    2023 07:01  -  2023 12:47  --------------------------------------------------------  IN: 188 mL / OUT: 0 mL / NET: 188 mL        Physical Exam  General: No distress. Comfortable.  Neck: JVP not elevated  Chest: Clear to auscultation bilaterally  CV: Normal S1 and S2.   Abdomen: Soft, non-distended, non-tender  Skin: No rashes or skin breakdown  Neurology: Alert and oriented times three.    Labs:                        11.5   9.62  )-----------( 217      ( 2023 01:56 )             34.2         137  |  106  |  35<H>  ----------------------------<  127<H>  4.2   |  17<L>  |  1.80<H>    Ca    9.4      2023 01:56  Phos  3.0       Mg     2.1         TPro  7.3  /  Alb  3.1<L>  /  TBili  0.5  /  DBili  x   /  AST  41<H>  /  ALT  76<H>  /  AlkPhos  62      PT/INR - ( 2023 09:45 )   PT: 14.3 sec;   INR: 1.37 ratio         PTT - ( 2023 09:45 )  PTT:83.4 sec        Oxygen Saturation, Mixed: 74.3 ( @ 04:35)  Oxygen Saturation, Mixed: 78.7 ( @ 17:48)  Oxygen Saturation, Mixed: 78.6 ( @ 00:45)  Oxygen Saturation, Mixed: 78.2 ( @ 17:50)

## 2023-11-14 NOTE — CONSULT NOTE ADULT - ATTENDING COMMENTS
Agree with above. D/w daughter at bedside - patient has no family history of colon cancer. He reportedly had a colonoscopy done within the last 5 years with one small polyp removed. Daughter will try to bring in records - will review records. If up to date with screening, can proceed with advanced heart therapy. If unable to obtain records, would consider virtual colonography once patient is able to come off IABP given elevated cardiac/anesthesia risks.

## 2023-11-14 NOTE — PROGRESS NOTE ADULT - SUBJECTIVE AND OBJECTIVE BOX
PATIENT:  LD VALENCIA  [id number]    CHIEF COMPLAINT:  Patient is a 58y old male who presents with a chief complaint of Cardiogenic shock (2023 20:47)      INTERVAL HISTORY/OVERNIGHT EVENTS:  - [place]/intro removed overnight  - Remains off lido and quiet on tele      REVIEW OF SYSTEMS:    Constitutional:     [X] negative [ ] fevers [ ] chills [ ] weight loss [ ] weight gain  HEENT:                  [X] negative [ ] dry eyes [ ] eye irritation [ ] postnasal drip [ ] nasal congestion  CV:                         [X] negative  [ ] chest pain [ ] orthopnea [ ] palpitations [ ] murmur  Resp:                     [X] negative [ ] cough [ ] shortness of breath [ ] dyspnea [ ] wheezing [ ] sputum [ ] hemoptysis  GI:                          [X] negative [ ] nausea [ ] vomiting [ ] diarrhea [ ] constipation [ ] abd pain [ ] dysphagia   :                        [X] negative [ ] dysuria [ ] nocturia [ ] hematuria [ ] increased urinary frequency  Musculoskeletal: [X] negative [ ] back pain [ ] myalgias [ ] arthralgias [ ] fracture  Skin:                       [X] negative [ ] rash [ ] itch  Neurological:        [X] negative [ ] headache [ ] dizziness [ ] syncope [ ] weakness [ ] numbness  Psychiatric:           [X] negative [ ] anxiety [ ] depression    MEDICATIONS:  MEDICATIONS  (STANDING):  aMIOdarone    Tablet 400 milliGRAM(s) Oral every 8 hours  aMIOdarone    Tablet   Oral   aspirin  chewable 81 milliGRAM(s) Oral daily  atorvastatin 80 milliGRAM(s) Oral at bedtime  benzonatate 100 milliGRAM(s) Oral once  budesonide  80 MICROgram(s)/formoterol 4.5 MICROgram(s) Inhaler 2 Puff(s) Inhalation two times a day  chlorhexidine 2% Cloths 1 Application(s) Topical daily  dextrose 50% Injectable 12.5 Gram(s) IV Push once  dextrose 50% Injectable 25 Gram(s) IV Push once  glucagon  Injectable 1 milliGRAM(s) IntraMuscular once  heparin  Infusion 1600 Unit(s)/Hr (14 mL/Hr) IV Continuous <Continuous>  hydrALAZINE 100 milliGRAM(s) Oral three times a day  insulin glargine Injectable (LANTUS) 8 Unit(s) SubCutaneous at bedtime  insulin lispro (ADMELOG) corrective regimen sliding scale   SubCutaneous three times a day before meals  insulin lispro Injectable (ADMELOG) 8 Unit(s) SubCutaneous three times a day before meals  isosorbide   dinitrate Tablet (ISORDIL) 40 milliGRAM(s) Oral three times a day  lidocaine   4% Patch 1 Patch Transdermal every 24 hours  pantoprazole  Injectable 40 milliGRAM(s) IV Push daily  sodium chloride 3%  Inhalation 4 milliLiter(s) Inhalation every 12 hours  spironolactone 25 milliGRAM(s) Oral daily  tiotropium 2.5 MICROgram(s) Inhaler 2 Puff(s) Inhalation daily    MEDICATIONS  (PRN):  albuterol/ipratropium for Nebulization 3 milliLiter(s) Nebulizer every 6 hours PRN Shortness of Breath and/or Wheezing  guaiFENesin Oral Liquid (Sugar-Free) 100 milliGRAM(s) Oral every 6 hours PRN Cough  LORazepam     Tablet 0.5 milliGRAM(s) Oral every 6 hours PRN Anxiety      ALLERGIES:  penicillins (Unknown)    OBJECTIVE:  ICU Vital Signs Last 24 Hrs  T(C): 36.7 (2023 03:00), Max: 37.5 (2023 11:00)  T(F): 98.1 (2023 03:00), Max: 99.5 (2023 11:00)  HR: 87 (2023 07:00) (79 - 101)  BP: 106/57 (2023 14:00) (106/57 - 106/57)  BP(mean): 74 (2023 14:00) (74 - 74)  ABP: 108/57 (2023 07:00) (108/57 - 137/74)  ABP(mean): 84 (2023 07:00) (84 - 110)  RR: 23 (2023 07:00) (18 - 32)  SpO2: 96% (2023 07:00) (92% - 97%)    O2 Parameters below as of 2023 06:00  Patient On (Oxygen Delivery Method): nasal cannula  O2 Flow (L/min): 3    Adult Advanced Hemodynamics Last 24 Hrs  CVP(mm Hg): 2 (2023 23:00) (2 - 9)  CVP(cm H2O): --  CO: --  CI: --  PA: 32/4 (2023 23:00) (30/6 - 45/13)  PA(mean): 18 (2023 23:00) (17 - 30)  PCWP: --  SVR: --  SVRI: --  PVR: --  PVRI: --    CAPILLARY BLOOD GLUCOSE  POCT Blood Glucose.: 167 mg/dL (2023 22:24)  POCT Blood Glucose.: 124 mg/dL (2023 16:50)  POCT Blood Glucose.: 175 mg/dL (2023 11:33)    I&O's Summary    2023 07:01  -  2023 07:00  --------------------------------------------------------  IN: 2134.2 mL / OUT: 2100 mL / NET: 34.2 mL      Daily     Daily Weight in k.3 (2023 06:00)    PHYSICAL EXAMINATION:  General: WN/WD NAD  HEENT: PERRLA, EOMI, moist mucous membranes  Neurology: A&Ox3, nonfocal, MOISE x 4  Respiratory: CTA B/L, normal respiratory effort, no wheezes, crackles, rales  CV: RRR, S1S2, no murmurs, rubs or gallops  Abdominal: Soft, obese, NT, ND +BS  Extremities: Warm and dry, no edema, + peripheral pulses    LABS:  ABG - ( 2023 01:55 )  pH, Arterial: 7.40  pH, Blood: x     /  pCO2: 30    /  pO2: 116   / HCO3: 19    / Base Excess: -5.2  /  SaO2: 99.2                 11.5   9.62  )-----------( 217      ( 2023 01:56 )             34.2         137  |  106  |  35<H>  ----------------------------<  127<H>  4.2   |  17<L>  |  1.80<H>    Ca    9.4      2023 01:56  Phos  3.0       Mg     2.1         TPro  7.3  /  Alb  3.1<L>  /  TBili  0.5  /  DBili  x   /  AST  41<H>  /  ALT  76<H>  /  AlkPhos  62      LIVER FUNCTIONS - ( 2023 01:56 )  Alb: 3.1 g/dL / Pro: 7.3 g/dL / ALK PHOS: 62 U/L / ALT: 76 U/L / AST: 41 U/L / GGT: x           PT/INR - ( 2023 20:55 )   PT: 13.8 sec;   INR: 1.26 ratio    PTT - ( 2023 02:07 )  PTT:42.1 sec    Urinalysis Basic - ( 2023 01:56 )    Color: x / Appearance: x / SG: x / pH: x  Gluc: 127 mg/dL / Ketone: x  / Bili: x / Urobili: x   Blood: x / Protein: x / Nitrite: x   Leuk Esterase: x / RBC: x / WBC x   Sq Epi: x / Non Sq Epi: x / Bacteria: x PATIENT:  LD VALENCIA  [id number]    CHIEF COMPLAINT:  Patient is a 58y old male who presents with a chief complaint of Cardiogenic shock (2023 20:47)      INTERVAL HISTORY/OVERNIGHT EVENTS:  - [place]/intro removed overnight  - Remains off lido and quiet on tele      REVIEW OF SYSTEMS:    Constitutional:     [X] negative [ ] fevers [ ] chills [ ] weight loss [ ] weight gain  HEENT:                  [X] negative [ ] dry eyes [ ] eye irritation [ ] postnasal drip [ ] nasal congestion  CV:                         [X] negative  [ ] chest pain [ ] orthopnea [ ] palpitations [ ] murmur  Resp:                     [X] negative [ ] cough [ ] shortness of breath [ ] dyspnea [ ] wheezing [ ] sputum [ ] hemoptysis  GI:                          [X] negative [ ] nausea [ ] vomiting [ ] diarrhea [ ] constipation [ ] abd pain [ ] dysphagia   :                        [X] negative [ ] dysuria [ ] nocturia [ ] hematuria [ ] increased urinary frequency  Musculoskeletal: [X] negative [ ] back pain [ ] myalgias [ ] arthralgias [ ] fracture  Skin:                       [X] negative [ ] rash [ ] itch  Neurological:        [X] negative [ ] headache [ ] dizziness [ ] syncope [ ] weakness [ ] numbness  Psychiatric:           [X] negative [ ] anxiety [ ] depression    MEDICATIONS:  MEDICATIONS  (STANDING):  aMIOdarone    Tablet 400 milliGRAM(s) Oral every 8 hours  aMIOdarone    Tablet   Oral   aspirin  chewable 81 milliGRAM(s) Oral daily  atorvastatin 80 milliGRAM(s) Oral at bedtime  benzonatate 100 milliGRAM(s) Oral once  budesonide  80 MICROgram(s)/formoterol 4.5 MICROgram(s) Inhaler 2 Puff(s) Inhalation two times a day  chlorhexidine 2% Cloths 1 Application(s) Topical daily  dextrose 50% Injectable 12.5 Gram(s) IV Push once  dextrose 50% Injectable 25 Gram(s) IV Push once  glucagon  Injectable 1 milliGRAM(s) IntraMuscular once  heparin  Infusion 1600 Unit(s)/Hr (14 mL/Hr) IV Continuous <Continuous>  hydrALAZINE 100 milliGRAM(s) Oral three times a day  insulin glargine Injectable (LANTUS) 8 Unit(s) SubCutaneous at bedtime  insulin lispro (ADMELOG) corrective regimen sliding scale   SubCutaneous three times a day before meals  insulin lispro Injectable (ADMELOG) 8 Unit(s) SubCutaneous three times a day before meals  isosorbide   dinitrate Tablet (ISORDIL) 40 milliGRAM(s) Oral three times a day  lidocaine   4% Patch 1 Patch Transdermal every 24 hours  pantoprazole  Injectable 40 milliGRAM(s) IV Push daily  sodium chloride 3%  Inhalation 4 milliLiter(s) Inhalation every 12 hours  spironolactone 25 milliGRAM(s) Oral daily  tiotropium 2.5 MICROgram(s) Inhaler 2 Puff(s) Inhalation daily    MEDICATIONS  (PRN):  albuterol/ipratropium for Nebulization 3 milliLiter(s) Nebulizer every 6 hours PRN Shortness of Breath and/or Wheezing  guaiFENesin Oral Liquid (Sugar-Free) 100 milliGRAM(s) Oral every 6 hours PRN Cough  LORazepam     Tablet 0.5 milliGRAM(s) Oral every 6 hours PRN Anxiety      ALLERGIES:  penicillins (Unknown)    OBJECTIVE:  ICU Vital Signs Last 24 Hrs  T(C): 36.7 (2023 03:00), Max: 37.5 (2023 11:00)  T(F): 98.1 (2023 03:00), Max: 99.5 (2023 11:00)  HR: 87 (2023 07:00) (79 - 101)  BP: 106/57 (2023 14:00) (106/57 - 106/57)  BP(mean): 74 (2023 14:00) (74 - 74)  ABP: 108/57 (2023 07:00) (108/57 - 137/74)  ABP(mean): 84 (2023 07:00) (84 - 110)  RR: 23 (2023 07:00) (18 - 32)  SpO2: 96% (2023 07:00) (92% - 97%)    O2 Parameters below as of 2023 06:00  Patient On (Oxygen Delivery Method): nasal cannula  O2 Flow (L/min): 3    Adult Advanced Hemodynamics Last 24 Hrs  CVP(mm Hg): 2 (2023 23:00) (2 - 9)  CVP(cm H2O): --  CO: --  CI: --  PA: 32/4 (2023 23:00) (30/6 - 45/13)  PA(mean): 18 (2023 23:00) (17 - 30)  PCWP: --  SVR: --  SVRI: --  PVR: --  PVRI: --    CAPILLARY BLOOD GLUCOSE  POCT Blood Glucose.: 167 mg/dL (2023 22:24)  POCT Blood Glucose.: 124 mg/dL (2023 16:50)  POCT Blood Glucose.: 175 mg/dL (2023 11:33)    I&O's Summary    2023 07:01  -  2023 07:00  --------------------------------------------------------  IN: 2134.2 mL / OUT: 2100 mL / NET: 34.2 mL      Daily     Daily Weight in k.3 (2023 06:00)    PHYSICAL EXAMINATION:  General: WN/WD NAD  HEENT: PERRLA, EOMI, moist mucous membranes  Neurology: A&Ox3, nonfocal, MOISE x 4  Respiratory: CTA B/L, normal respiratory effort, no wheezes, crackles, rales  CV: RRR, S1S2, no murmurs, rubs or gallops  Abdominal: Soft, obese, NT, ND +BS  Extremities: Warm and dry, no edema, + peripheral pulses  skin induration noted R groin area    LABS:  ABG - ( 2023 01:55 )  pH, Arterial: 7.40  pH, Blood: x     /  pCO2: 30    /  pO2: 116   / HCO3: 19    / Base Excess: -5.2  /  SaO2: 99.2                 11.5   9.62  )-----------( 217      ( 2023 01:56 )             34.2         137  |  106  |  35<H>  ----------------------------<  127<H>  4.2   |  17<L>  |  1.80<H>    Ca    9.4      2023 01:56  Phos  3.0       Mg     2.1         TPro  7.3  /  Alb  3.1<L>  /  TBili  0.5  /  DBili  x   /  AST  41<H>  /  ALT  76<H>  /  AlkPhos  62      LIVER FUNCTIONS - ( 2023 01:56 )  Alb: 3.1 g/dL / Pro: 7.3 g/dL / ALK PHOS: 62 U/L / ALT: 76 U/L / AST: 41 U/L / GGT: x           PT/INR - ( 2023 20:55 )   PT: 13.8 sec;   INR: 1.26 ratio    PTT - ( 2023 02:07 )  PTT:42.1 sec    Urinalysis Basic - ( 2023 01:56 )    Color: x / Appearance: x / SG: x / pH: x  Gluc: 127 mg/dL / Ketone: x  / Bili: x / Urobili: x   Blood: x / Protein: x / Nitrite: x   Leuk Esterase: x / RBC: x / WBC x   Sq Epi: x / Non Sq Epi: x / Bacteria: x

## 2023-11-14 NOTE — PROGRESS NOTE ADULT - ASSESSMENT
57 yo male h/o htn, cad s/p pci, ICH, here with NSTEMI  s/p intubation and cath. now in CCU    NSTEMI  s/p  cath  cath results noted. multi vessel dz., CTSx f/u.   hep gtt  pt with vtach/fib arrest again on 11/8. s/p ACLS and ROSC.   now extubated  IABP in place  mngt as per CCU  plan for transplant as per HF and transplant team    LV thrombus   hep gtt    acute on chronic systolic heart failure  heart failure team f/u      pna  recently diagnosed outpt  iv abx now stopped  f/u cult   id following     covid  supportive care    h/o ICH  resolved    agitation  psy consult f/u    mngt as per CCU team  d/w CCU attn    d/w pt and pts wife bedside       Advanced care planning was discussed with patient and family.  Advanced care planning forms were reviewed and discussed as appropriate.  Differential diagnosis and plan of care discussed with patient after the evaluation.   Pain assessed and judicious use of narcotics when appropriate was discussed.  Importance of Fall prevention discussed.  Counseling on Smoking and Alcohol cessation was offered when appropriate.  Counseling on Diet, exercise, and medication compliance was done.       Approx 75 minutes spent.

## 2023-11-14 NOTE — PROGRESS NOTE ADULT - PROBLEM SELECTOR PLAN 1
- IABP at 1:1. On AC with Heparin infusion (also for LV thrombus)  - Continue HDZN 100 mg TID and ISDN 40 mg TID, hold for SBP <90  - Continue Spironolactone 25 mg QD  - PRN diuretics to target net even fluid balance. Bedside IVC assessment, small and collapsible   - AT evaluation: launched 11/10. Pending US Elastography and LE venous US. He recalls having had colonoscopy few years prior, will try to find report.   - Please keep primary Dr. Banuelos [phone] in the loop per family request.

## 2023-11-14 NOTE — CONSULT NOTE ADULT - SUBJECTIVE AND OBJECTIVE BOX
HPI:    Mr. Hardy is a 58 yrs old male w/ hx of HFrEF (LVIDd 6.4 cm, LVEF 32%), CAD s/p PCI (), HTN, DMT2 (A1c 8.3%) and CVA s/p TPA (), recently treated for PNA who initially presented to [facility] via EMS after syncope reportedly requiring defibrilation. Treated for ACS but left AMA to come to [facility]. On arrival ECG with ST depression in lateral leads. Intubated  for respiratory failure. Found to have COVID. L/RHC evealing  of LAD and RCA, elevated filling pressures and CI of 1.5 prompting placement of IABP. Extubated 11/3. IABP was weaned to off , then the following day on , developed PMVT cardiac arrest with prompt CPR and defibrillation, started on IV Lidocaine and Amio. IABP ultimately replaced on  for worsening hypotension. TTE  revealing LV thrombus, currently he is on heparin drip. 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 heart transplant. GI consulted for colon cancer screening.     Patient reported he had colonoscopy < 5 years ago, reported they removed one small polyp, but no cancer. Denied fam hx of colon cancer, uterine or ovarian cancer. Had regular bowel movements with no melena or hematochezia prior to admission. Also denied weight loss before. Labs showed hgb 11.       Allergies:  penicillins (Unknown)      Home Medications:    · 	levoFLOXacin 500 mg oral tablet: Last Dose Taken: 31-Oct-2023 , 1 tab(s) orally once a day for 10 days  · 	ipratropium-albuterol 0.5 mg-2.5 mg/3 mL inhalation solution: Last Dose Taken:  , 3 milliliter(s) by nebulizer once a day  · 	losartan 25 mg oral tablet: Last Dose Taken:  , 1 tab(s) orally once a day  · 	Ozempic 4 mg/3 mL (1 mg dose) subcutaneous solution: Last Dose Taken: 26-Oct-2023 , 1 milligram(s) subcutaneously once a week  · 	Trelegy Ellipta 100 mcg-62.5 mcg-25 mcg/inh inhalation powder: Last Dose Taken:  , 2 puff(s) inhaled 2 times a day NOTE: As per Pharmacy, last filled 10/22  · 	dapagliflozin 10 mg oral tablet: Last Dose Taken:  , 1 tab(s) orally once a day (in the morning)  · 	montelukast 10 mg oral tablet: Last Dose Taken:  , 1 tab(s) orally once a day  · 	Albuterol (Eqv-ProAir HFA) 90 mcg/inh inhalation aerosol: Last Dose Taken:  , 2 puff(s) inhaled every 6 hours as needed  · 	hydroCHLOROthiazide 25 mg oral tablet: Last Dose Taken:  , 1 tab(s) orally once a day (in the morning)  · 	clopidogrel 75 mg oral tablet: Last Dose Taken: 31-Oct-2023 , 1 tab(s) orally once a day  · 	carvedilol 25 mg oral tablet: Last Dose Taken:  , 1 tab(s) orally 2 times a day    Hospital Medications:  acetaminophen     Tablet .. 975 milliGRAM(s) Oral every 6 hours PRN  aMIOdarone    Tablet 400 milliGRAM(s) Oral every 8 hours  aMIOdarone    Tablet   Oral   aspirin  chewable 81 milliGRAM(s) Oral daily  atorvastatin 80 milliGRAM(s) Oral at bedtime  benzonatate 100 milliGRAM(s) Oral once  budesonide  80 MICROgram(s)/formoterol 4.5 MICROgram(s) Inhaler 2 Puff(s) Inhalation two times a day  chlorhexidine 2% Cloths 1 Application(s) Topical daily  dextrose 50% Injectable 12.5 Gram(s) IV Push once  dextrose 50% Injectable 25 Gram(s) IV Push once  guaiFENesin Oral Liquid (Sugar-Free) 100 milliGRAM(s) Oral every 6 hours PRN  heparin  Infusion 1600 Unit(s)/Hr IV Continuous <Continuous>  hydrALAZINE 100 milliGRAM(s) Oral three times a day  insulin glargine Injectable (LANTUS) 8 Unit(s) SubCutaneous at bedtime  insulin lispro (ADMELOG) corrective regimen sliding scale   SubCutaneous three times a day before meals  insulin lispro Injectable (ADMELOG) 8 Unit(s) SubCutaneous three times a day before meals  isosorbide   dinitrate Tablet (ISORDIL) 40 milliGRAM(s) Oral three times a day  lidocaine   4% Patch 1 Patch Transdermal every 24 hours  LORazepam     Tablet 0.5 milliGRAM(s) Oral every 6 hours PRN  sodium chloride 3%  Inhalation 4 milliLiter(s) Inhalation every 12 hours  spironolactone 25 milliGRAM(s) Oral daily      PMHX/PSHX:  HTN (hypertension)    CAD (coronary artery disease)    Intracranial hemorrhage    Respiratory arrest    Myocardial infarction, unspecified MI type, unspecified artery    History of coronary artery stent placement        Family history:  Family history of meningitis (Sibling)    Family history of hypertension in mother    Denied colon, ovarian and uterine cancer.     Social History:   Tob: Denies  EtOH: Denies  Illicit Drugs: Denies    ROS:     General:  No wt loss, fevers, chills, night sweats, fatigue  Eyes:  Good vision, no reported pain  ENT:  No sore throat, pain, runny nose, dysphagia  CV:  No pain, palpitations, hypo/hypertension  Pulm:  No dyspnea, cough, tachypnea, wheezing  GI:  see HPI  :  No pain, bleeding, incontinence, nocturia  Muscle:  No pain, weakness  Neuro:  No weakness, tingling, memory problems  Psych:  No fatigue, insomnia, mood problems, depression  Endocrine:  No polyuria, polydipsia, cold/heat intolerance  Heme:  No petechiae, ecchymosis, easy bruisability  Skin:  No rash, tattoos, scars, edema    PHYSICAL EXAM:     GENERAL:  No acute distress, critically ill appearing, lying in bed.   HEENT:  NCAT, no scleral icterus   CHEST:  no respiratory distress  HEART:  Regular rate and rhythm  ABDOMEN:  Soft, non-tender, mildly distended, no masses  EXTREMITIES: trace pitting edema b/l.   SKIN:  No rash/erythema/ecchymoses/petechiae/wounds/abscess/warm/dry  NEURO:  Alert and oriented x 3, no tremors.     Vital Signs:  Vital Signs Last 24 Hrs  T(C): 36.7 (2023 03:00), Max: 37.1 (2023 19:00)  T(F): 98.1 (2023 03:00), Max: 98.8 (2023 19:00)  HR: 86 (2023 15:00) (79 - 99)  BP: --  BP(mean): --  RR: 24 (2023 15:00) (18 - 32)  SpO2: 96% (2023 15:00) (92% - 97%)    Parameters below as of 2023 15:00  Patient On (Oxygen Delivery Method): room air      Daily     Daily Weight in k.3 (2023 06:00)    LABS:                        11.5   9.62  )-----------( 217      ( 2023 01:56 )             34.2     Mean Cell Volume: 87.9 fl (-23 @ 01:56)        137  |  106  |  35<H>  ----------------------------<  127<H>  4.2   |  17<L>  |  1.80<H>    Ca    9.4      2023 01:56  Phos  3.0       Mg     2.1         TPro  7.3  /  Alb  3.1<L>  /  TBili  0.5  /  DBili  x   /  AST  41<H>  /  ALT  76<H>  /  AlkPhos  62      LIVER FUNCTIONS - ( 2023 01:56 )  Alb: 3.1 g/dL / Pro: 7.3 g/dL / ALK PHOS: 62 U/L / ALT: 76 U/L / AST: 41 U/L / GGT: x           PT/INR - ( 2023 09:45 )   PT: 14.3 sec;   INR: 1.37 ratio         PTT - ( 2023 09:45 )  PTT:83.4 sec  Urinalysis Basic - ( 2023 01:56 )    Color: x / Appearance: x / SG: x / pH: x  Gluc: 127 mg/dL / Ketone: x  / Bili: x / Urobili: x   Blood: x / Protein: x / Nitrite: x   Leuk Esterase: x / RBC: x / WBC x   Sq Epi: x / Non Sq Epi: x / Bacteria: x                              11.5   9.62  )-----------( 217      ( 2023 01:56 )             34.2                         12.1   10.96 )-----------( 247      ( 2023 04:45 )             37.0                         11.6   10.78 )-----------( 245      ( 2023 17:56 )             34.9                         11.1   10.92 )-----------( 240      ( 2023 01:08 )             33.6       Imaging:    CT A/P non con  FINDINGS:    Evaluation of the parenchymal organs and vascular structures is limited   without intravenous contrast.    CHEST:  LUNGS AND LARGE AIRWAYS: Mild secretions within the trachea and bilateral   mainstem bronchi. Left lower lobe consolidation, likely combination of   pneumonia and atelectasis.  PLEURA: Small left and trace right pleural effusions.  VESSELS: Atherosclerotic changes of the aorta and coronary arteries.   Right IJ approach Mach[place]ongo-Sonja catheter with tip terminating in the right   main pulmonary artery. Right upper extremity midline catheter terminates   in the distal right subclavian vein. Left femoral approach intra-aortic   balloon pump with superior tip justdistal to the aortic arch and   inferior tip distal to the take-off of the celiac axis, SMA, and   bilateral renal arteries.  HEART: Cardiomegaly. No pericardial effusion.  MEDIASTINUM AND DEE: No lymphadenopathy by size criteria. Multiple   subcentimeter in short axis mediastinal nodes, nonspecific. Mildly   patulous esophagus with secretions.  CHEST WALL AND LOWER NECK: Within normal limits.    ABDOMEN AND PELVIS:  LIVER: Within normal limits.  BILE DUCTS: Normal caliber.  GALLBLADDER: Within normal limits.  SPLEEN: Within normal limits.  PANCREAS: Within normal limits.  ADRENALS: Within normal limits.  KIDNEYS/URETERS: No renal stone or hydronephrosis.    BLADDER: Moderately distended with indwelling Llamas catheter. Layering   hyperdense material, may represent small stones.  REPRODUCTIVE ORGANS: Prostate is enlarged.    BOWEL: Scattered colonic diverticula. No bowel obstruction. Appendix is   normal.  PERITONEUM: No ascites.  VESSELS: Left common femoral central venous catheter terminates in the   left external iliac vein. Atherosclerotic changes.  RETROPERITONEUM/LYMPH NODES: No lymphadenopathy.  ABDOMINAL WALL: Mild left groin fluid infiltration and fat stranding   adjacent to the central catheter. Small fat-containing umbilical hernia.   Small bilateral fat-containing inguinal hernias.  BONES: Acute nondisplaced fractures of the left anterior 3rd-5th ribs.   Acute nondisplaced fracture of the mid sternum (6-88). Degenerative   changes.    IMPRESSION:  Left lower lobe pneumonia.    Acute nondisplaced left 3-5th rib fractures and mid-sternal fracture.    Left femoral approach intra-aortic balloon pump with superior tip just   distal to the aortic arch and inferior tip distal to the take-off of the   celiac axis, SMA, andbilateral renal arteries, increasing risk for   mesenteric/renal ischemia. Recommend repositioning.

## 2023-11-14 NOTE — PROGRESS NOTE ADULT - SUBJECTIVE AND OBJECTIVE BOX
Subjective:  - No acute events overnight. [place] removed yesterday afternoon. No say orthopnea or PND. Ongoing chest soreness with cough or sneeze    Medications:  acetaminophen     Tablet .. 650 milliGRAM(s) Oral every 6 hours PRN  aMIOdarone    Tablet   Oral   aMIOdarone    Tablet 400 milliGRAM(s) Oral every 8 hours  aspirin  chewable 81 milliGRAM(s) Oral daily  atorvastatin 80 milliGRAM(s) Oral at bedtime  benzonatate 100 milliGRAM(s) Oral once  budesonide  80 MICROgram(s)/formoterol 4.5 MICROgram(s) Inhaler 2 Puff(s) Inhalation two times a day  chlorhexidine 2% Cloths 1 Application(s) Topical daily  dextrose 50% Injectable 25 Gram(s) IV Push once  dextrose 50% Injectable 12.5 Gram(s) IV Push once  guaiFENesin Oral Liquid (Sugar-Free) 100 milliGRAM(s) Oral every 6 hours PRN  heparin  Infusion 1600 Unit(s)/Hr IV Continuous <Continuous>  hydrALAZINE 100 milliGRAM(s) Oral three times a day  insulin glargine Injectable (LANTUS) 8 Unit(s) SubCutaneous at bedtime  insulin lispro (ADMELOG) corrective regimen sliding scale   SubCutaneous three times a day before meals  insulin lispro Injectable (ADMELOG) 8 Unit(s) SubCutaneous three times a day before meals  isosorbide   dinitrate Tablet (ISORDIL) 40 milliGRAM(s) Oral three times a day  lidocaine   4% Patch 1 Patch Transdermal every 24 hours  LORazepam     Tablet 0.5 milliGRAM(s) Oral every 6 hours PRN  sodium chloride 3%  Inhalation 4 milliLiter(s) Inhalation every 12 hours  spironolactone 25 milliGRAM(s) Oral daily    Physical Exam:    Vitals:  Vital Signs Last 24 Hours  T(C): 36.7 (23 @ 03:00), Max: 37.3 (23 @ 15:00)  HR: 99 (23 @ 12:00) (79 - 99)  BP: 106/57 (23 @ 14:00) (106/57 - 106/57)  RR: 24 (23 @ 12:00) (18 - 32)  SpO2: 94% (23 @ 12:00) (92% - 97%)    Weight in k.3 (14 @ 06:00)    I&O's Summary    2023 07:  -  2023 07:00  --------------------------------------------------------  IN: 2134.2 mL / OUT: 2100 mL / NET: 34.2 mL    2023 07:01  -  2023 13:47  --------------------------------------------------------  IN: 188 mL / OUT: 0 mL / NET: 188 mL    Tele: SR 90s    General: No distress. Comfortable.  HEENT: EOM intact.  Neck: Neck supple. JVP 6 cm H2O no HJR  Chest: Clear to auscultation bilaterally  CV: Normal S1 and S2. No murmurs, rub, or gallops. Radial pulses normal.  Abdomen: Soft, non-distended, non-tender  Extremities: Warm peripherally. No edema   Neurology: Alert and oriented times three. Sensation intact  Psych: Affect normal      Labs:                        11.5   9.62  )-----------( 217      ( 2023 01:56 )             34.2         137  |  106  |  35<H>  ----------------------------<  127<H>  4.2   |  17<L>  |  1.80<H>    Ca    9.4      2023 01:56  Phos  3.0       Mg     2.1         TPro  7.3  /  Alb  3.1<L>  /  TBili  0.5  /  DBili  x   /  AST  41<H>  /  ALT  76<H>  /  AlkPhos  62  14    PT/INR - ( 2023 09:45 )   PT: 14.3 sec;   INR: 1.37 ratio         PTT - ( 2023 09:45 )  PTT:83.4 sec        Oxygen Saturation, Mixed: 74.3 ( @ 04:35)  Oxygen Saturation, Mixed: 78.7 ( @ 17:48)  Oxygen Saturation, Mixed: 78.6 ( @ 00:45)  Oxygen Saturation, Mixed: 78.2 ( @ 17:50)

## 2023-11-14 NOTE — PROGRESS NOTE ADULT - NS ATTEND AMEND GEN_ALL_CORE FT
Patient found in bed in NAD, remains on support with IABP w/o PA catheter. Renal function is improving, creat today 1.8 << 2.0 << 2.2. He is undergoing evaluation for AT. He remains euvolemic on exam; POCUS shows IVC <2 cm and collapsing, off diuretics.     Continue IABP support   No need for diuretics   Continue afterload reduction with hydralazine and ISDN   Continue MRA   Continue work up for AT, patient has significant ischemic cardiomyopathy with severe LV systolic dysfunction and high risk of ventricular arrhythmias. He is s/p PMVT cardiac arrest. Revascularization has been ruled out by CT surgery and interventional cardiology. It is not safe to remove the IABP again     Pending GI evaluation and elastography   PT follow up   Nutrition support   Discussed with CCU team

## 2023-11-14 NOTE — CONSULT NOTE ADULT - ASSESSMENT
Impression:     #Cardiogenic shock c/w Vfib arrest s/p amiodarone and IABP  #Colon cancer screening  Currently evaluated Impression:     #Cardiogenic shock c/w Vfib arrest s/p amiodarone and IABP  Currently being evaluated for possible heart transplant, patient is high risk for anesthesia for a colonoscopy. He has no red flags for colon cancer, has regular non bloody bowel movements w/ no weight loss. Prior colonoscopy few years ago where they removed one polyp.     Recommendations:   - recommend obtaining colonoscopy records, daughter mentioned that she can get copies of it.   - Can consider CT colonography if the patient can come off the IABP.   - Would hold off colonoscopy as the patient is considered high risk for anesthesia.   - CBC daily and transfuse if hgb < 8.     Discussed the case with Dr. Jose.     All recommendations are tentative until note is attested by an attending.     Zulma Montanez, PGY-5  Gastroenterology/Hepatology Fellow  Available on [employer] Teams  [phone] (Short Range Pager)  [phone] (Long Range Pager)    After 5pm, please contact the on-call GI fellow. [phone] Impression:     #Cardiogenic shock c/w Vfib arrest s/p amiodarone and IABP  Currently being evaluated for possible heart transplant, patient is high risk for anesthesia for a colonoscopy. He has no red flags for colon cancer, has regular non bloody bowel movements w/ no weight loss. Prior colonoscopy few years ago where they removed one polyp.     Recommendations:   - recommend obtaining colonoscopy records, daughter mentioned that she can get copies of it.   - Will review records before further recommendations  - Can consider CT colonography if the patient can come off the IABP.   - Would hold off colonoscopy as the patient is considered high risk for anesthesia.   - CBC daily and transfuse if hgb < 8.     Discussed the case with Dr. Jose.     All recommendations are tentative until note is attested by an attending.     Zulma Montanez, PGY-5  Gastroenterology/Hepatology Fellow  Available on Microsoft Teams  [phone] (Short Range Pager)  [phone] (Long Range Pager)    After 5pm, please contact the on-call GI fellow. [phone]

## 2023-11-14 NOTE — PROGRESS NOTE ADULT - SUBJECTIVE AND OBJECTIVE BOX
LD VALENCIA  58y Male  MRN:[id number]    Patient is a 58y old  Male who presents with a chief complaint of NSTEMI (01 Nov 2023 20:29)    HPI:  59yo M w/ hx HTN, CAD w/ 1 stent in 2009, ICH (2008) presenting with abn ekg. Patient presented to [facility] where he was found to have STEMI, recommended to get cath however patient did not want to get it there so it left and came here.  Patient initially had cough, congestion, fever, was placed on antibiotics on Sunday.  Started feeling nauseous and had a presyncopal event after which he presented to ED last night.  Had chest pain as well.  Chest pain is midsternal.  Not currently having chest pain.  Received 4 aspirin 30 min pta. (01 Nov 2023 15:11)      Patient seen and evaluated at bedside in CCU. interval events noted    Interval HPI: remain in ccu. IABP in place     PAST MEDICAL & SURGICAL HISTORY:  HTN (hypertension)      CAD (coronary artery disease)  2009; stent      Intracranial hemorrhage  2008      Respiratory arrest  december 1st      Myocardial infarction, unspecified MI type, unspecified artery      History of coronary artery stent placement          REVIEW OF SYSTEMS:  as per hpi     VITALS:   ICU Vital Signs Last 24 Hrs  T(C): 36.7 (14 Nov 2023 03:00), Max: 37.3 (13 Nov 2023 15:00)  T(F): 98.1 (14 Nov 2023 03:00), Max: 99.1 (13 Nov 2023 15:00)  HR: 99 (14 Nov 2023 12:00) (79 - 99)  BP: 106/57 (13 Nov 2023 14:00) (106/57 - 106/57)  BP(mean): 74 (13 Nov 2023 14:00) (74 - 74)  ABP: 114/67 (14 Nov 2023 12:00) (108/57 - 137/74)  ABP(mean): 93 (14 Nov 2023 12:00) (84 - 110)  RR: 24 (14 Nov 2023 12:00) (18 - 32)  SpO2: 94% (14 Nov 2023 12:00) (92% - 97%)    O2 Parameters below as of 14 Nov 2023 12:00  Patient On (Oxygen Delivery Method): room air         PHYSICAL EXAM:  GENERAL: NAD, comfortable   HEAD:  Atraumatic, Normocephalic  EYES: EOMI, PERRLA, conjunctiva and sclera clear  NECK: Supple, No JVD +central line  CHEST/LUNG: Clear to auscultation bilaterally; No wheeze  HEART: Regular rate and rhythm; No murmurs, rubs, or gallops  ABDOMEN: Soft, Nontender, Nondistended; Bowel sounds present  EXTREMITIES:  2+ Peripheral Pulses, No clubbing, cyanosis, or edema  NEUROLOGY: nonfocal  SKIN: No rashes or lesions  +iabp    Consultant(s) Notes Reviewed:  [x ] YES  [ ] NO  Care Discussed with Consultants/Other Providers [ x] YES  [ ] NO    MEDS:   MEDICATIONS  (STANDING):  aMIOdarone    Tablet 400 milliGRAM(s) Oral every 8 hours  aMIOdarone    Tablet   Oral   aspirin  chewable 81 milliGRAM(s) Oral daily  atorvastatin 80 milliGRAM(s) Oral at bedtime  benzonatate 100 milliGRAM(s) Oral once  budesonide  80 MICROgram(s)/formoterol 4.5 MICROgram(s) Inhaler 2 Puff(s) Inhalation two times a day  chlorhexidine 2% Cloths 1 Application(s) Topical daily  dextrose 50% Injectable 12.5 Gram(s) IV Push once  dextrose 50% Injectable 25 Gram(s) IV Push once  heparin  Infusion 1600 Unit(s)/Hr (13 mL/Hr) IV Continuous <Continuous>  hydrALAZINE 100 milliGRAM(s) Oral three times a day  insulin glargine Injectable (LANTUS) 8 Unit(s) SubCutaneous at bedtime  insulin lispro (ADMELOG) corrective regimen sliding scale   SubCutaneous three times a day before meals  insulin lispro Injectable (ADMELOG) 8 Unit(s) SubCutaneous three times a day before meals  isosorbide   dinitrate Tablet (ISORDIL) 40 milliGRAM(s) Oral three times a day  lidocaine   4% Patch 1 Patch Transdermal every 24 hours  sodium chloride 3%  Inhalation 4 milliLiter(s) Inhalation every 12 hours  spironolactone 25 milliGRAM(s) Oral daily    MEDICATIONS  (PRN):  acetaminophen     Tablet .. 650 milliGRAM(s) Oral every 6 hours PRN Mild Pain (1 - 3)  guaiFENesin Oral Liquid (Sugar-Free) 100 milliGRAM(s) Oral every 6 hours PRN Cough  LORazepam     Tablet 0.5 milliGRAM(s) Oral every 6 hours PRN Anxiety      ALLERGIES:  penicillins (Unknown)      LABS:                                      11.5   9.62  )-----------( 217      ( 14 Nov 2023 01:56 )             34.2   11-14    137  |  106  |  35<H>  ----------------------------<  127<H>  4.2   |  17<L>  |  1.80<H>    Ca    9.4      14 Nov 2023 01:56  Phos  3.0     11-14  Mg     2.1     11-14    TPro  7.3  /  Alb  3.1<L>  /  TBili  0.5  /  DBili  x   /  AST  41<H>  /  ALT  76<H>  /  AlkPhos  62  11-14       < from: Xray Chest 1 View- PORTABLE-Urgent (11.01.23 @ 07:42) >    IMPRESSION:  Clear lungs.    ---End of Report ---        < end of copied text >  < from: TTE W or WO Ultrasound Enhancing Agent (11.01.23 @ 10:23) >  _____________________________     CONCLUSIONS:      1. Left ventricular cavity is moderately dilated. Left ventricular wall thickness is normal. Left ventricular systolic function is severely decreased with an ejection fraction of 32 % by Chinchilla's method of disks. Regional wall motion abnormalities present.   2. Multiple segmental abnormalities exist. See findings.   3. There is moderate (grade 2) left ventricular diastolic dysfunction, with indeterminant filling pressure.   4. Normal right ventricular cavity size, wall thickness, and systolic function.   5. No significant valvular disease.   6. No pericardial effusion seen.   7. Compared to the transthoracic echocardiogram performed on 1/25/2017 the areas of akinesis are unchanged but there has been a decline in LV systolic function with new areas of hypokinesis.    __________________________________________________________________    < end of copied text >  < from: TTE Limited W or WO Ultrasound Enhancing Agent (11.02.23 @ 07:41) >  __________________________     CONCLUSIONS:      1. After obtaining consent, Definity ultrasound enhancing agent was given for enhanced left ventricular opacification and improved delineation of the left ventricular endocardial borders. Left ventricular systolic function is severely decreased with a calculated ejection fraction of 22 % by the Chinchilla's biplane method of disks. There is a left ventricular thrombus.   2. Findings were discussed with Litzy BOSS on 11/2/2023 at 8.49am.   3. There is a left ventricular thrombus.    _________________________________________________________________    < end of copied text >

## 2023-11-14 NOTE — CONSULT NOTE ADULT - ASSESSMENT
57 yo M with HFrEF (LVIDd 6.4 cm, LVEF 32%), CAD s/p PCI (2008), HTN, DMT2 (A1c 8.3%) and CVA s/p TPA (2018), recently treated for PNA who initially presented to [facility] via EMS after syncope reportedly requiring defibrilation. Treated for ACS but left AMA to come to [facility]. On arrival ECG with ST depression in lateral leads. Intubated 11/1 for respiratory failure. Found to have COVID. L/RHC 11/1revealing  of LAD and RCA, elevated filling pressures and CI of 1.5 prompting placement of IABP. Extubated 11/3. IABP was weaned to off 11/7, then the following day on 11/8, developed PMVT cardiac arrest with prompt CPR and defibrillation, started on IV Lidocaine and Amio. IABP ultimately replaced on 11/9 for worsening hypotension. Advance therapy eval was launched for both LVAD/heart transplant, CTS consulted.

## 2023-11-14 NOTE — CONSULT NOTE ADULT - TIME BILLING
I reviewed the patients imaging, medical records, reports. With the patient  I discussed the risks, benefits and alternatives to both LVAD and transplant surgery. The pros, cons and risks associated with both LVAD implant and transplant surgery were discussed in detail

## 2023-11-14 NOTE — PROGRESS NOTE ADULT - PROBLEM SELECTOR PLAN 2
- PMVT into VF arrest 11/8, 3 rounds with shocks  - Transitioned from IV Lidocaine infusion and PO Amio load 11/14  - IABP support as above  - EP following

## 2023-11-14 NOTE — PROGRESS NOTE ADULT - ASSESSMENT
57 yo M with HFrEF (LVIDd 6.4 cm, LVEF 32%), CAD s/p PCI (2008), HTN, DMT2 (A1c 8.3%) and CVA s/p TPA (2018), recently treated for PNA who initially presented to [facility] via EMS after syncope reportedly requiring defibrilation. Treated for ACS but left AMA to come to [facility]. On arrival ECG with ST depression in lateral leads. Intubated 11/1 for respiratory failure. Found to have COVID. L/RHC 11/1revealing  of LAD and RCA, elevated filling pressures and CI of 1.5 prompting placement of IABP. Extubated 11/3. IABP was weaned to off 11/7, then the following day on 11/8, developed PMVT cardiac arrest with prompt CPR and defibrillation, started on IV Lidocaine and Amio. IABP ultimately replaced on 11/9 for worsening hypotension. TTE 11/11 revealing LV thrombus.     Overall, ACC/AHA Stage D CMP with concerning features and inability to wean off tMCS. AT evaluation launched 11/10, he is ABO A. Currently well supported on IABP at 1:1 without further arrhythmias.      Cardiac Studies  ·	11/1123 TTE: LVEF 22% (global), LV thrombus, normal RV size and function, mild MR, trace TR  ·	11/1/23  LHC showed pLAD 70 % stenosis in the ostium portion of the segment and 100 % in-stent restenosis and in mRCA, 100 % stenosis.   ·	11/1/23 RHC; RA 23 Vw 24, PA 52/33/40, PCWP 30 Vw 33, AO 85/66/73, PA sat 54.4%, CO/CI (F) 2.96/1.44, IABP was placed during the cath through R common femoral artery.   ·	11/1/23 TTE: LVIDd 6.4cm , LVEF 32%, regional WMA, grade II DD,  TAPSE 1.7cm, mld MR, trace TR,     Bedside hemodynamics  11/3: IABP 1:1 RA 5; PA 27/12/18; CO/CI 5.6/2.6; MAP 90-100s.  11/4/23 IABP 1:3 CVP 4 PA 37/18 SvO2 83.8 CO/CI 11.2 CI 5.1  (in the setting of fever to 38.3C)  11/5 IABP 1:1 CVP 3 PA 48/27 SvO2 78.4% CO 8.2 CI 3.7   11/1 (IABP 1:1): CVP 1, PA 33/5/16, MvO2 74.3%

## 2023-11-14 NOTE — CHART NOTE - NSCHARTNOTEFT_GEN_A_CORE
Nutrition Follow Up Note  Patient seen for: follow up  Chart reviewed, events noted    Per chart, patient is a 57 y/o male with PMH including HTN, CAD (s/p PCI ), HFrEF (EF severely reduced ) not on GDMT, h/o CVA  (requiring TPA), DM. Patient presents to [facility] for SOB x1 week. Intubated on  for respiratory failure. Extubated to nasal cannula 11/3. Found to be COVID-19 positive w/ persistent fevers. S/p IABP for hemodynamic support in setting of possible sepsis. S/p VFib arrest requiring multiple shocks, ROSC ~10 minutes on , transfers back to CICU.    Source: [x] Patient       [x] EMR        [x] RN        [x] Family at bedside; pt's daughter       [] Other:  - If unable to interview patient: [] Trach/Vent/BiPAP  [] Disoriented/confused/inappropriate to interview    Diet, Regular:   Consistent Carbohydrate {Evening Snack} (CSTCHOSN)  Low Sodium (23 @ 11:53) [Active]  -> Noted pt previously ordered for Soft + Bite Sized + DASH + Consistent Carbohydrate diet. Pt denies chewing/swallowing difficulties at this time.   -> Pt reports not liking some of the food options; RD provided pt with menu and reviewed ordering procedures with him and his daughter  -> Pt's daughter asking if family can bring in foods from home; RD advised on staying within diet parameters and letting CICU team know when food is brought in.    Nutrition-related concerns:  - Extubated 11/10  - Under Advanced Therapies evaluation  - Remains on IABP for mechanical support  - 8 units lantus, ISS admleog for glycemic control at this time; HbA1c: 8.3% (suboptimal glycemic control for age); noted pt reports being told he is pre-diabetic only. Previously on Ozempic at home but reports stopped taking it as he does not like how it made him feel. Was not checking FS PTA.    LVAD/ Heart Transplant Evaluation: Pt reports intact appetite PTA. Reports following low salt diet at home. Typical breakfast: eggs with coffee. Usually skips lunch; reports working 7 days/week so will sometimes "forget to eat" or snack on junk. Reports his wife will usually prepare dinner at home; some type of protein with rice or pasta and vegetables. No known food allergies reported. Does not drink milk or eat pork. Denies ETOH use. Reports his spouse does the grocery food shopping. Reports ordering in or going out to eat at restaurants ~50% of the time.  Reports being physically active PTA; reports he was able to workout and started doing so a few months ago; reports having a . Pt denies financial concerns related to obtaining foods. Reports was on a diuretic PTA and compliance with it. Was not taking daily weights.    GI:  Last BM: 11/14x1 per flow sheets  Bowel Regimen? [x] No    Weights:   Daily Weight in k.3 (-), Weight in k (-12), Weight in k.2 (-)  Wt history: Pt reports UBW ~180-190 pounds; reports recent weights likely elevated secondary to fluid retention. On/off diuresis. Will monitor trends as able. Noted weight trending downward towards UBW of ~81-86 kG.    Drug Dosing Weight  Height (cm): 175.3 (2023 15:48)  Weight (kg): 98.9 (2023 15:48)  BMI (kg/m2): 32.2 (2023 15:48)-> recalculated based on lowest on daily wt of 92.3 kG (-) (30.0 kG/m2).  BSA (m2): 2.14 (2023 15:48)    MEDICATIONS  (STANDING):  aMIOdarone    Tablet   Oral   aMIOdarone    Tablet 400 milliGRAM(s) Oral every 8 hours  aspirin  chewable 81 milliGRAM(s) Oral daily  atorvastatin 80 milliGRAM(s) Oral at bedtime  benzonatate 100 milliGRAM(s) Oral once  budesonide  80 MICROgram(s)/formoterol 4.5 MICROgram(s) Inhaler 2 Puff(s) Inhalation two times a day  chlorhexidine 2% Cloths 1 Application(s) Topical daily  dextrose 50% Injectable 12.5 Gram(s) IV Push once  dextrose 50% Injectable 25 Gram(s) IV Push once  heparin  Infusion 1600 Unit(s)/Hr (13 mL/Hr) IV Continuous <Continuous>  hydrALAZINE 100 milliGRAM(s) Oral three times a day  insulin glargine Injectable (LANTUS) 8 Unit(s) SubCutaneous at bedtime  insulin lispro (ADMELOG) corrective regimen sliding scale   SubCutaneous three times a day before meals  insulin lispro Injectable (ADMELOG) 8 Unit(s) SubCutaneous three times a day before meals  isosorbide   dinitrate Tablet (ISORDIL) 40 milliGRAM(s) Oral three times a day  lidocaine   4% Patch 1 Patch Transdermal every 24 hours  sodium chloride 3%  Inhalation 4 milliLiter(s) Inhalation every 12 hours  spironolactone 25 milliGRAM(s) Oral daily    Pertinent Labs:  @ 01:56: Na 137, BUN 35<H>, Cr 1.80<H>, <H>, K+ 4.2, Phos 3.0, Mg 2.1, Alk Phos 62, ALT/SGPT 76<H>, AST/SGOT 41<H>, HbA1c --    A1C with Estimated Average Glucose Result: 8.3 % (23 @ 06:14)    Finger Sticks:  POCT Blood Glucose.: 172 mg/dL ( @ 09:27)  POCT Blood Glucose.: 167 mg/dL ( @ 22:24)  POCT Blood Glucose.: 124 mg/dL ( @ 16:50)    Triglycerides, Serum: 95 mg/dL (11-10-23 @ 17:25)  Triglycerides, Serum: 344 mg/dL (23 @ 01:49)    Skin per nursing documentation: no pressure injuries per documentation  Edema: none per documentation    Estimated Needs:   [x] recalculated: based on wt of 92 kG:  Estimated Caloric Needs: (23-28 kcal/kg): 0197-0031 kcal  Estimated Protein Needs: (1.2-1.4 g/kg): 110-128 gm  Defer fluid needs to team.    Previous Nutrition Diagnosis: increased nutrient needs, Inadequate energy intake  Nutrition Diagnosis is: [x] ongoing in setting of acute illness, intubation     New Nutrition Diagnosis: Food & Nutrition-Related Knowledge Deficit related to previous limited exposure to diet education as evidenced by pt new to LVAD/ Heart Transplant Medical Nutrition Therapy diet education.  Goal: Pt to teach back 3 points of nutrition education provided upon follow up     Nutrition Care Plan:  [x] In Progress  [] Achieved  [] Not applicable    Nutrition Interventions:     Education Provided:       [x] Yes: In the setting of LVAD and heart transplant evaluation, RD reviewed food safety after solid organ transplant guidelines; including, storage/cooking temperatures, cross contamination, expiration dates, and avoiding buffets and avoid eating out. Discussed S&S of food borne illness.  Reviewed food and immunosuppressive drug interactions (prednisone, tacrolimus, and cellcept). Reviewed the importance of BG control while on prednisone. Reviewed importance of a low K+ diet and reviewed foods high in K+ to be mindful of. Reviewed importance of avoiding grapefruit, pomegranate, starfruit and sour oranges. Pt was receptive to information and was able to use teach back points to verbalize understanding. Pt accepted written materials on discussed topics. Pt did not want to discuss LVAD education at this time.    IMPRESSION:  At this time, no absolute nutritional contraindications proceeding LVAD/heart transplant; however, slightly elevated BMI is noted - BMI has trended down though over the last few weeks with diuretic therapies; currently BMI 30.0 kg/m2 which is down from 32.2kg/m2. Additionally, Pt denies any history of DM/Pre-DM, yet A1c of 8.3% is indicative of diabetic levels - while this is not an absolute contraindication, it has potential to impede postoperative wound healing. RD will continue to discuss/promote nutrition optimization and follow-up with ongoing diet education.    Recommendations:  1. Continue current Consistent Carbohydrate + Low Sodium diet as tolerated; regular texture now ordered; defer to speech therapist / medical team  2. RD remains available to provide ongoing nutrition education PRN   3. Trend BG levels, renal indices, LFT's, electrolytes and triglycerides     Monitoring and Evaluation:   Continue to monitor nutritional intake, tolerance to diet prescription, weights, labs, skin integrity    RD remains available upon request and will follow up per protocol  Sultana Llanes, MS, RD, CDN, CNSC avail. on [facility] Teams

## 2023-11-14 NOTE — PROGRESS NOTE ADULT - SUBJECTIVE AND OBJECTIVE BOX
LD VALENCIA  MRN-[id number]  Patient is a 58y old  Male who presents with a chief complaint of CP/SOB (2023 08:09)    HPI:  pt seen and approx 1:30 pm  in [facility]    57yo M w/ hx HTN, CAD w/ 1 stent in , ICH () presenting with abn ekg. Patient presented to [facility] where he was found to have STEMI, recommended to get cath however patient did not want to get it there so it left and came here.  Patient initially had cough, congestion, fever, was placed on antibiotics on .  Started feeling nauseous and had a presyncopal event after which he presented to ED last night.  Had chest pain as well.  Chest pain is midsternal.  Not currently having chest pain.  Received 4 aspirin 30 min pta. (2023 15:11)      Hospital Course:    24 HOUR EVENTS:    REVIEW OF SYSTEMS:    CONSTITUTIONAL: No weakness, fevers or chills  EYES/ENT: No visual changes;  No vertigo or throat pain   NECK: No pain or stiffness  RESPIRATORY: No cough, wheezing, hemoptysis; No shortness of breath  CARDIOVASCULAR: No chest pain or palpitations  GASTROINTESTINAL: No abdominal or epigastric pain. No nausea, vomiting, or hematemesis; No diarrhea or constipation. No melena or hematochezia.  GENITOURINARY: No dysuria, frequency or hematuria  NEUROLOGICAL: No numbness or weakness  SKIN: No itching, rashes      ICU Vital Signs Last 24 Hrs  T(C): 36.9 (2023 16:00), Max: 36.9 (2023 16:00)  T(F): 98.4 (2023 16:00), Max: 98.4 (2023 16:00)  HR: 85 (2023 18:00) (79 - 99)  BP: --  BP(mean): --  ABP: 113/65 (2023 18:00) (108/57 - 137/74)  ABP(mean): 91 (2023 18:00) (84 - 110)  RR: 32 (2023 18:00) (18 - 32)  SpO2: 95% (2023 18:00) (92% - 97%)    O2 Parameters below as of 2023 18:00  Patient On (Oxygen Delivery Method): room air            CVP(mm Hg): 2 (23 @ 23:00) (-1 - 9)  CO: --  CI: --  PA: 32/4 (23 @ 23:00) (25/3 - 49/12)  PA(mean): 18 (23 @ 23:00) (15 - 32)  PA(direct): --  PCWP: --  LA: --  RA: --  SVR: --  SVRI: --  PVR: --  PVRI: --  I&O's Summary    2023 07:01  -  2023 07:00  --------------------------------------------------------  IN: 2134.2 mL / OUT: 2100 mL / NET: 34.2 mL    2023 07:01  -  2023 19:24  --------------------------------------------------------  IN: 626 mL / OUT: 700 mL / NET: -74 mL        CAPILLARY BLOOD GLUCOSE    CAPILLARY BLOOD GLUCOSE      POCT Blood Glucose.: 163 mg/dL (2023 17:04)      PHYSICAL EXAM:  GENERAL: No acute distress, well-developed  HEAD:  Atraumatic, Normocephalic  EYES: EOMI, PERRLA, conjunctiva and sclera clear  NECK: Supple, no lymphadenopathy, no JVD  CHEST/LUNG: CTAB; No wheezes, rales, or rhonchi  HEART: Regular rate and rhythm. Normal S1/S2. No murmurs, rubs, or gallops  ABDOMEN: Soft, non-tender, non-distended; normal bowel sounds, no organomegaly  EXTREMITIES:  2+ peripheral pulses b/l, No clubbing, cyanosis, or edema  NEUROLOGY: A&O x 3, no focal deficits  SKIN: No rashes or lesions    ============================I/O===========================   I&O's Detail    2023 07:01  -  2023 07:00  --------------------------------------------------------  IN:    Heparin: 339 mL    IV PiggyBack: 200 mL    Lidocaine: 15.2 mL    Oral Fluid: 1580 mL  Total IN: 2134.2 mL    OUT:    Indwelling Catheter - Urethral (mL): 1775 mL    Voided (mL): 325 mL  Total OUT: 2100 mL    Total NET: 34.2 mL      2023 07:01  -  2023 19:24  --------------------------------------------------------  IN:    Heparin: 146 mL    Oral Fluid: 480 mL  Total IN: 626 mL    OUT:    Voided (mL): 700 mL  Total OUT: 700 mL    Total NET: -74 mL        ============================ LABS =========================                        11.5   9.62  )-----------( 217      ( 2023 01:56 )             34.2         137  |  106  |  35<H>  ----------------------------<  127<H>  4.2   |  17<L>  |  1.80<H>    Ca    9.4      2023 01:56  Phos  3.0       Mg     2.1         TPro  7.3  /  Alb  3.1<L>  /  TBili  0.5  /  DBili  x   /  AST  41<H>  /  ALT  76<H>  /  AlkPhos  62  11-14                LIVER FUNCTIONS - ( 2023 01:56 )  Alb: 3.1 g/dL / Pro: 7.3 g/dL / ALK PHOS: 62 U/L / ALT: 76 U/L / AST: 41 U/L / GGT: x           PT/INR - ( 2023 17:22 )   PT: 14.0 sec;   INR: 1.34 ratio         PTT - ( 2023 17:22 )  PTT:85.7 sec  ABG - ( 2023 01:55 )  pH, Arterial: 7.40  pH, Blood: x     /  pCO2: 30    /  pO2: 116   / HCO3: 19    / Base Excess: -5.2  /  SaO2: 99.2              Blood Gas Arterial, Lactate: 1.1 mmol/L (23 @ 01:55)  Blood Gas Venous - Lactate: 1.3 mmol/L (23 @ 01:55)  Blood Gas Arterial, Lactate: 1.1 mmol/L (23 @ 04:35)  Blood Gas Venous - Lactate: 1.2 mmol/L (23 @ 04:35)  Blood Gas Venous - Lactate: 0.8 mmol/L (23 @ 17:48)  Blood Gas Arterial, Lactate: 0.7 mmol/L (23 @ 00:45)    Urinalysis Basic - ( 2023 01:56 )    Color: x / Appearance: x / SG: x / pH: x  Gluc: 127 mg/dL / Ketone: x  / Bili: x / Urobili: x   Blood: x / Protein: x / Nitrite: x   Leuk Esterase: x / RBC: x / WBC x   Sq Epi: x / Non Sq Epi: x / Bacteria: x      ======================Micro/Rad/Cardio=================  Telemtry: Reviewed   EKG: Reviewed  CXR: Reviewed  Culture: Reviewed   Echo:   Cath: Cardiac Cath Lab - Adult:   [facility]  Department of Cardiology  [address] [place] [address]  [phone]  Cath Lab Report -- Comprehensive Report  Patient: LD VALENCIA  Study date: 2017  Account number: [id number]  MR number: [id number]  : 1965  Gender: Male  Race: W  Case Physician(s):  Jairon Holguin M.D.  Referring Physician:  Luc Lynn M.D.  INDICATIONS: Unstable angina - CCS4.  HISTORY: The patient has a history of coronary artery disease. The patient  hashypertension and medication-treated dyslipidemia.  PROCEDURE:  --  Left heart catheterization with ventriculography.  --  Left coronary angiography.  --  Right coronary angiography.  TECHNIQUE: The risks and alternatives of the procedures and conscious  sedation were explained to the patient and informed consent was obtained.  Cardiac catheterization performed electively.  Local anesthetic given. Right radial artery access. A 6FR PRELUDE KIT was  inserted in the vessel. Left heart catheterization. Ventriculography was  performed. A 5FR FR4.0 EXPO catheter was utilized. Left coronary artery  angiography. The vessel was injected utilizing a 5FR FL3.5 EXPO catheter.  Right coronary artery angiography. The vessel was injected utilizing a 5FR  FR4.0 EXPO catheter. RADIATION EXPOSURE: 1.1 min.  CONTRAST GIVEN: Omnipaque 55 ml.  MEDICATIONS GIVEN: Midazolam, 1 mg, IV. Fentanyl, 25 mcg, IV. Verapamil  (Isoptin, Calan, Covera), 2.5 mg, IA. Heparin, 3000 units, IA.  VENTRICLES: Global left ventricular function was moderately depressed. EF  estimated was 40 %.  CORONARY VESSELS: The coronary circulation is right dominant.  LM:   --  LM: Normal.  LAD:   --  Proximal LAD: There was a 50 % stenosis.  CX:   --  Circumflex: Normal.  RCA:   --  Mid RCA: There was a 40 % stenosis.  --  Distal RCA: There was a 50 % stenosis.  COMPLICATIONS: There were no complications.  DIAGNOSTIC RECOMMENDATIONS: The patient should continue with the present  medications.  Prepared and signed by  Jairon Holguin M.D.  Signed 2017 12:20:13  HEMODYNAMIC TABLES  Pressures:  Baseline/ Room Air  Pressures:  - HR: 78  Pressures:  - Rhythm:  Pressures:  -- Aortic Pressure (S/D/M): --/--/99  Pressures:  -- Left Ventricle (s/edp): 157/39/--  Outputs:  Baseline/ Room Air  Outputs:  -- CALCULATIONS: Age in years: 51.41  Outputs:  -- CALCULATIONS: Body Surface Area: 2.05  Outputs:  -- CALCULATIONS: Height in cm: 175.00  Outputs:  -- CALCULATIONS: Sex: Male  Outputs:  -- CALCULATIONS: Weight in k.40 (17 @ 21:55)    ======================================================  PAST MEDICAL & SURGICAL HISTORY:  HTN (hypertension)      CAD (coronary artery disease)  ; stent      Intracranial hemorrhage        Respiratory arrest        Myocardial infarction, unspecified MI type, unspecified artery      History of coronary artery stent placement        ====================ASSESSMENT ==============  59 yo M with HTN, CAD (s/p PCI ), HFrEF, CVA , and T2DM presenting with chest pressure and unknown tachycardia that was shocked x1, [facility]  found to have in-stent restenosis of pLAD and  of RCA with elevated RA and PA pressures and severely decreased EF 32%, admitted to CICU for management of cardiogenic shock requiring IABP  -, with hospital course c/b vfib arrest.     Plan:  ====================== NEUROLOGY=====================  Anxiety  - Ativan 0.5 mg PO Q 6hrs PRN   - No Seroquel or antipsychotics since vfib arrest and prolonged QTC  - Psych Eval last week, recs for continuing Ativan PRN and had recommended SSRI, but pt. refused     ==================== RESPIRATORY======================  Acute Hypoxemic Respiratory Failure  - s/p 2 intubations for cardiogenic pulm edema and the in setting of cardiac arrest  - Extubated 11/10  - currently on room air with spO2 mid 90s    COVID +  - off isolation: cleared by ID      Asthma (hx of respiratory failure in 2018- intubated for 20 minutes per chart review pt report)  - c/w albuterol, symbicort and spiriva  - on trelegy at home    ====================CARDIOVASCULAR==================  Vfib arrest insetting of ischemia vs. shock  - Lido off since    - PO Amio load dose (s/p IV amio ~1950mg lV gtt) for total of 5 grams per EP to complete   - Keep K > 4, Mag > 2.2     Cardiogenic shock requiring IABP (- , -)  - likely 2/2 NSTEMI and ADHF  -  LHC: pLAD 100 % in-stent restenosis & mRCA, 100 %. PCWP 30. IABP placed.  -  TTE: LV dilated. EF 32 %. Regional WMAs present, mod (grade 2) LV diastolic dysfunction  -  TTE: EF 22% and + LV thrombus   - IABP removed , vfib , IABP later reinserted that day for CS  - D/C Milrinone gtt 7:30 am   - Currently on hydralazine 100 TID and ISD 40 TID for AL reduction  - Holding diuretics at this time, PRN Bumex IVP as needed  - continue romel 25 daily for GDMT  - AT Eval, F/u HF recs    NSTEMI- Stent re-occlusion of pLAD and 100%  of RCA  - EKG on admission w/LBBB  - DAPT: c/w ASA, Brilinta d/c'd per transplant w/u  - Cont. lipitor 80  - cMR deferred given necessity of IABP  - CT sx not recommending CABG, undergoing AT eval    LV thrombus  - c/w heparin gtt    ===================HEMATOLOGIC/ONC ===================  - H/H and platelets stable    VTE prophylaxis   - c/w heparin gtt given LV Thrombus and IABP     ===================== RENAL =========================  Non-oliguric ARIC   - sCr now improving. Baseline Cr: 1-1.22  - Diuresis held, optimize pre-renal factors for optimal perfusion and volume status  - Trend BMP, lytes daily, replace as needed  - Continue Strict I/Os, avoid nephrotoxins    ==================== GASTROINTESTINAL===================  Diet  -Soft and bite sized     GERD  - c/w home protonix    Bowel regimen  - miralax and senna d/c'd iso loose stools- trend quantity and quality of BMs    =======================    ENDOCRINE  =====================  Type 2 DM  - A1c 8.3, sugars uncontrolled on admission likely stress induced s/p cardiac arrest  - insulin gtt transitioned off  AM  - lantus 8, premeal 8, low ISS    ========================INFECTIOUS DISEASE================  - afebrile, continue to monitor off abx  - concern for aspiration event prior to intubation on admission - s/p vanco and cefepime  - BCx 11/10 NGTD, RVP neg, MRSA neg, UA neg    COVID  - Off airborne precautions     Pre-transplant ID w/u   - trend ID recs for serologies     LINES:   RIJ [place] -, RAx Jacklyn , LFA IABP       Patient requires continuous monitoring with bedside rhythm monitoring, pulse ox monitoring, and intermittent blood gas analysis. Care plan discussed with ICU care team. Patient remained critical and at risk for life threatening decompensation.  Patient seen, examined and plan discussed with CCU team during rounds.     I have personally provided ____ minutes of critical care time excluding time spent on separate procedures, in addition to initial critical care time provided by the CICU Attending, Dr. Durand.    By signing my name below, I, Rosalba Tapia, attest that this documentation has been prepared under the direction and in the presence of Rita Hawthorne NP   Electronically signed: Rosalba Phillips, 23 @ 19:24    I, Rita Hawthorne, personally performed the services described in this documentation. all medical record entries made by the eileenibe were at my direction and in my presence. I have reviewed the chart and agree that the record reflects my personal performance and is accurate and complete  Electronically signed: Rita Hawthorne NP  LD VALENCIA  MRN-[id number]  Patient is a 58y old  Male who presents with a chief complaint of CP/SOB (2023 08:09)    HPI: 58M w/ hx HTN, CAD w/ 1 stent in , ICH () presenting with abn ekg. Patient presented to [facility] where he was found to have STEMI, recommended to get cath however patient did not want to get it there so it left and came here.  Patient initially had cough, congestion, fever, was placed on antibiotics on .  Started feeling nauseous and had a presyncopal event after which he presented to ED last night.  Had chest pain as well.  Chest pain is midsternal.  Not currently having chest pain.  Received 4 aspirin 30 min pta. (2023 15:11)    REVIEW OF SYSTEMS:  CONSTITUTIONAL: No weakness, fevers or chills  EYES/ENT: No visual changes;  No vertigo or throat pain   NECK: No pain or stiffness  RESPIRATORY: No cough, wheezing, hemoptysis; No shortness of breath  CARDIOVASCULAR: No chest pain or palpitations  GASTROINTESTINAL: No abdominal or epigastric pain. No nausea, vomiting, or hematemesis; No diarrhea or constipation. No melena or hematochezia.  GENITOURINARY: No dysuria, frequency or hematuria  NEUROLOGICAL: No numbness or weakness  SKIN: No itching, rashes      ICU Vital Signs Last 24 Hrs  T(C): 36.9 (2023 16:00), Max: 36.9 (2023 16:00)  T(F): 98.4 (2023 16:00), Max: 98.4 (2023 16:00)  HR: 85 (2023 18:00) (79 - 99)  ABP: 113/65 (2023 18:00) (108/57 - 137/74)  ABP(mean): 91 (2023 18:00) (84 - 110)  RR: 32 (2023 18:00) (18 - 32)  SpO2: 95% (2023 18:00) (92% - 97%)    O2 Parameters below as of 2023 18:00  Patient On (Oxygen Delivery Method): room air    CVP(mm Hg): 2 (-13-23 @ 23:00) (-1 - 9)  PA: 32/4 (23 @ 23:00) (25/3 - 49/12)  PA(mean): 18 (23 @ 23:00) (15 - 32)      I&O's Summary    2023 07:  -  2023 07:00  --------------------------------------------------------  IN: 2134.2 mL / OUT: 2100 mL / NET: 34.2 mL    2023 07:  -  2023 19:24  --------------------------------------------------------  IN: 626 mL / OUT: 700 mL / NET: -74 mL      CAPILLARY BLOOD GLUCOSE  POCT Blood Glucose.: 163 mg/dL (2023 17:04)  ============================I/O===========================   I&O's Detail    2023 07:01  -  2023 07:00  --------------------------------------------------------  IN:    Heparin: 339 mL    IV PiggyBack: 200 mL    Lidocaine: 15.2 mL    Oral Fluid: 1580 mL  Total IN: 2134.2 mL    OUT:    Indwelling Catheter - Urethral (mL): 1775 mL    Voided (mL): 325 mL  Total OUT: 2100 mL    Total NET: 34.2 mL      2023 07:  -  2023 19:24  --------------------------------------------------------  IN:    Heparin: 146 mL    Oral Fluid: 480 mL  Total IN: 626 mL    OUT:    Voided (mL): 700 mL  Total OUT: 700 mL    Total NET: -74 mL  ============================ LABS =========================                        11.5   9.62  )-----------( 217      ( 2023 01:56 )             34.2         137  |  106  |  35<H>  ----------------------------<  127<H>  4.2   |  17<L>  |  1.80<H>    Ca    9.4      2023 01:56  Phos  3.0       Mg     2.1         TPro  7.3  /  Alb  3.1<L>  /  TBili  0.5  /  DBili  x   /  AST  41<H>  /  ALT  76<H>  /  AlkPhos  62      LIVER FUNCTIONS - ( 2023 01:56 )  Alb: 3.1 g/dL / Pro: 7.3 g/dL / ALK PHOS: 62 U/L / ALT: 76 U/L / AST: 41 U/L / GGT: x           PT/INR - ( 2023 17:22 )   PT: 14.0 sec;   INR: 1.34 ratio    PTT - ( 2023 17:22 )  PTT:85.7 sec    ABG - ( 2023 01:55 )  pH, Arterial: 7.40  pH, Blood: x     /  pCO2: 30    /  pO2: 116   / HCO3: 19    / Base Excess: -5.2  /  SaO2: 99.2      Blood Gas Arterial, Lactate: 1.1 mmol/L (23 @ 01:55)  Blood Gas Venous - Lactate: 1.3 mmol/L (23 @ 01:55)  Blood Gas Arterial, Lactate: 1.1 mmol/L (23 @ 04:35)  Blood Gas Venous - Lactate: 1.2 mmol/L (23 @ 04:35)  Blood Gas Venous - Lactate: 0.8 mmol/L (23 @ 17:48)  Blood Gas Arterial, Lactate: 0.7 mmol/L (23 @ 00:45)    Urinalysis Basic - ( 2023 01:56 )    Color: x / Appearance: x / SG: x / pH: x  Gluc: 127 mg/dL / Ketone: x  / Bili: x / Urobili: x   Blood: x / Protein: x / Nitrite: x   Leuk Esterase: x / RBC: x / WBC x   Sq Epi: x / Non Sq Epi: x / Bacteria: x  ======================Micro/Rad/Cardio=================  Telemtry: Reviewed   EKG: Reviewed  CXR: Reviewed  Culture: Reviewed   Echo:   Cath: Cardiac Cath Lab - Adult:   [facility]  Department of Cardiology  [address] [place] [address]  [phone]  Cath Lab Report -- Comprehensive Report  Patient: LD VALENCIA  Study date: 2017  Account number: [id number]  MR number: [id number]  : 1965  Gender: Male  Race: W  Case Physician(s):  Jairon Holguin M.D.  Referring Physician:  Luc Lynn M.D.  INDICATIONS: Unstable angina - CCS4.  HISTORY: The patient has a history of coronary artery disease. The patient  hashypertension and medication-treated dyslipidemia.  PROCEDURE:  --  Left heart catheterization with ventriculography.  --  Left coronary angiography.  --  Right coronary angiography.  TECHNIQUE: The risks and alternatives of the procedures and conscious  sedation were explained to the patient and informed consent was obtained.  Cardiac catheterization performed electively.  Local anesthetic given. Right radial artery access. A 6FR PRELUDE KIT was  inserted in the vessel. Left heart catheterization. Ventriculography was  performed. A 5FR FR4.0 EXPO catheter was utilized. Left coronary artery  angiography. The vessel was injected utilizing a 5FR FL3.5 EXPO catheter.  Right coronary artery angiography. The vessel was injected utilizing a 5FR  FR4.0 EXPO catheter. RADIATION EXPOSURE: 1.1 min.  CONTRAST GIVEN: Omnipaque 55 ml.  MEDICATIONS GIVEN: Midazolam, 1 mg, IV. Fentanyl, 25 mcg, IV. Verapamil  (Isoptin, Calan, Covera), 2.5 mg, IA. Heparin, 3000 units, IA.  VENTRICLES: Global left ventricular function was moderately depressed. EF  estimated was 40 %.  CORONARY VESSELS: The coronary circulation is right dominant.  LM:   --  LM: Normal.  LAD:   --  Proximal LAD: There was a 50 % stenosis.  CX:   --  Circumflex: Normal.  RCA:   --  Mid RCA: There was a 40 % stenosis.  --  Distal RCA: There was a 50 % stenosis.  COMPLICATIONS: There were no complications.  DIAGNOSTIC RECOMMENDATIONS: The patient should continue with the present  medications.  Prepared and signed by  Jairon Holguin M.D.  Signed 2017 12:20:13  HEMODYNAMIC TABLES  Pressures:  Baseline/ Room Air  Pressures:  - HR: 78  Pressures:  - Rhythm:  Pressures:  -- Aortic Pressure (S/D/M): --/--/99  Pressures:  -- Left Ventricle (s/edp): 157/39/--  Outputs:  Baseline/ Room Air  Outputs:  -- CALCULATIONS[age]  Outputs:  -- CALCULATIONS: Body Surface Area: 2.05  Outputs:  -- CALCULATIONS: Height in cm: 175.00  Outputs:  -- CALCULATIONS: Sex: Male  Outputs:  -- CALCULATIONS: Weight in k.40 (17 @ 21:55)  ======================================================  PAST MEDICAL & SURGICAL HISTORY:  HTN (hypertension)      CAD (coronary artery disease)  ; stent      Intracranial hemorrhage        Respiratory arrest        Myocardial infarction, unspecified MI type, unspecified artery      History of coronary artery stent placement    ====================ASSESSMENT ==============  57 yo M with HTN, CAD (s/p PCI ), HFrEF, CVA , and T2DM presenting with chest pressure and unknown tachycardia that was shocked x1, [facility]  found to have in-stent restenosis of pLAD and  of RCA with elevated RA and PA pressures and severely decreased EF 32%, admitted to CICU for management of cardiogenic shock requiring IABP  -, with hospital course c/b vfib arrest.     Plan:  ====================== NEUROLOGY=====================  Anxiety  - Ativan 0.5 mg PO Q 6hrs PRN   - No Seroquel or antipsychotics since vfib arrest and prolonged QTC  - Psych Eval last week, recs for continuing Ativan PRN and had recommended SSRI, but pt. refused     ==================== RESPIRATORY======================  Acute Hypoxemic Respiratory Failure  - s/p 2 intubations for cardiogenic pulm edema and the in setting of cardiac arrest  - Extubated 11/10  - currently on room air with spO2 mid 90s    COVID +  - off isolation: cleared by ID      Asthma (hx of respiratory failure in 2018- intubated for 20 minutes per chart review pt report)  - c/w albuterol, symbicort and spiriva  - on trelegy at home    ====================CARDIOVASCULAR==================  Vfib arrest insetting of ischemia vs. shock  - Lido off since    - PO Amio load dose (s/p IV amio ~1950mg lV gtt) for total of 5 grams per EP to complete   - Keep K > 4, Mag > 2.2     Cardiogenic shock requiring IABP (- , -)  - likely 2/2 NSTEMI and ADHF  -  LHC: pLAD 100 % in-stent restenosis & mRCA, 100 %. PCWP 30. IABP placed.  -  TTE: LV dilated. EF 32 %. Regional WMAs present, mod (grade 2) LV diastolic dysfunction  -  TTE: EF 22% and + LV thrombus   - IABP removed 11/7, vfib , IABP later reinserted that day for CS  - D/C Milrinone gtt 7:30 am   - Currently on hydralazine 100 TID and ISD 40 TID for AL reduction  - Holding diuretics at this time, PRN Bumex IVP as needed  - continue romel 25 daily for GDMT  - AT Eval, F/u HF recs    NSTEMI- Stent re-occlusion of pLAD and 100%  of RCA  - EKG on admission w/LBBB  - DAPT: c/w ASA, Brilinta d/c'd per transplant w/u  - Cont. lipitor 80  - cMR deferred given necessity of IABP  - CT sx not recommending CABG, undergoing AT eval    LV thrombus  - c/w heparin gtt    ===================HEMATOLOGIC/ONC ===================  - H/H and platelets stable    VTE prophylaxis   - c/w heparin gtt given LV Thrombus and IABP     ===================== RENAL =========================  Non-oliguric ARIC   - sCr now improving. Baseline Cr: 1-1.22  - Diuresis held, optimize pre-renal factors for optimal perfusion and volume status  - Trend BMP, lytes daily, replace as needed  - Continue Strict I/Os, avoid nephrotoxins    ==================== GASTROINTESTINAL===================  Diet  -Soft and bite sized     GERD  - c/w home protonix    Bowel regimen  - miralax and senna d/c'd iso loose stools- trend quantity and quality of BMs    =======================    ENDOCRINE  =====================  Type 2 DM  - A1c 8.3, sugars uncontrolled on admission likely stress induced s/p cardiac arrest  - insulin gtt transitioned off  AM  - lantus 8, premeal 8, low ISS    ========================INFECTIOUS DISEASE================  - afebrile, continue to monitor off abx  - concern for aspiration event prior to intubation on admission - s/p vanco and cefepime  - BCx 11/10 NGTD, RVP neg, MRSA neg, UA neg    COVID  - Off airborne precautions     Pre-transplant ID w/u   - trend ID recs for serologies     LINES:   RIJ [place] -, RAx Jacklyn , LFA IABP       Patient requires continuous monitoring with bedside rhythm monitoring, pulse ox monitoring, and intermittent blood gas analysis. Care plan discussed with ICU care team. Patient remained critical and at risk for life threatening decompensation.  Patient seen, examined and plan discussed with CCU team during rounds.     I have personally provided 35 minutes of critical care time excluding time spent on separate procedures, in addition to initial critical care time provided by the CICU Attending, Dr. Durand.    By signing my name below, I, Rosalba Tapia, attest that this documentation has been prepared under the direction and in the presence of Rita Hawthorne NP   Electronically signed: Sangita Phillips, 23 @ 19:24    I, Rita Hawthorne, personally performed the services described in this documentation. all medical record entries made by the sangita were at my direction and in my presence. I have reviewed the chart and agree that the record reflects my personal performance and is accurate and complete  Electronically signed: Rita Hawthorne NP

## 2023-11-14 NOTE — PROGRESS NOTE ADULT - CRITICAL CARE ATTENDING COMMENT
History of anxiety disorder and CAD s/p PCI  Admitted with cardiogenic shock and respiratory failure in the setting of stent restenosis  Transferred out and had VT/VF cardiac arrest  Intubated during arrest, ROSC achieved, readmitted to CICU  A+Ox3, anxious - continue Ativan 0.5 q6h  Cardiogenic shock on IABP, off milrinone   Maintain IABP and afterload reduction  VT/VF arrest, on Amiodarone PO and Lidocaine infusion, wean lido as tolerated     Continue Amio  Continue with ASA  SpO2 mid 90s on 2L NC wean as tolerated   Soft diet  Non-oliguric ARIC that is improving, likely ATN from arrest  H/H low but acceptable on Heparin drip for LV thrombus  Afebrile, cultures no growth, no antibiotics   Sugars controlled on Lantus  LFA IABP, rangel 11/9

## 2023-11-14 NOTE — PROGRESS NOTE ADULT - ASSESSMENT
====================ASSESSMENT ==============  57 yo M with HTN, CAD (s/p PCI 2008), HFrEF, CVA 2018, and T2DM presenting with chest pressure and unknown tachycardia that was shocked x1, [facility] 11/1 found to have in-stent restenosis of pLAD and  of RCA with elevated RA and PA pressures and severely decreased EF 32%, admitted to CICU for management of cardiogenic shock requiring IABP 11/1 -11/7, with hospital course c/b vfib arrest.     PLAN  ====================NEURO====================  # Anxiety  - Ativan 0.5 mg PO Q 6hrs PRN   - No Seroquel or antipsychotics since vfib arrest and prolonged QTC  - Psych Eval last week, recs for continuing Ativan PRN and had recommended SSRI, but pt. refused     ====================PULM====================  # Acute Hypoxemic Respiratory Failure  - s/p 2 intubations for cardiogenic pulm edema and the in setting of cardiac arrest  - Extubated 11/10  - currently on room air with spO2 mid 90s    # COVID +  - off isolation: cleared by ID 11/11     # Asthma (hx of respiratory failure in 2018- intubated for 20 minutes per chart review pt report)  - c/w albuterol, symbicort and spiriva  - on trelegy at home    ====================CARDIOVASCULAR====================  # Vfib arrest insetting of ischemia vs. shock  - Lido off since 11/13   - PO Amio load dose (s/p IV amio ~1950mg lV gtt) for total of 5 grams per EP to complete 11/17  - Eventual BB when more compensated  - Keep K > 4, Mag > 2.2     # Cardiogenic shock requiring IABP (11/1- 11/7, 11/9-)  - likely 2/2 NSTEMI and ADHF  - 11/1 LHC: pLAD 100 % in-stent restenosis & mRCA, 100 %. PCWP 30. IABP placed.  - 11/1 TTE: LV dilated. EF 32 %. Regional WMAs present, mod (grade 2) LV diastolic dysfunction  - 11/2 TTE: EF 22% and + LV thrombus   - IABP removed 11/7, vfib 11/9, IABP later reinserted that day for CS  - D/C Milrinone gtt 7:30 am 11/11  - currently on hydralazine 100 TID and ISD 40 TID: ALR, as Cr improves consider ARB initiation  - Holding diuretics at this time, PRN Bumex IVP as needed  - continue romel 25 daily for GDMT  - AT Eval, F/u HF recs    # NSTEMI- Stent re-occlusion of pLAD and 100%  of RCA  - EKG on admission w/LBBB  - DAPT: c/w ASA, brillinta   - Cont. lipitor 80  - cMR deferred given necessity of IABP  - CT sx not recommending CABG, undergoing AT eval    # LV thrombus  - c/w heparin gtt    ====================/RENAL====================  # non-oliguric ARIC   - sCr now improving. Baseline Cr: 1-1.22  - diuresis held, optimize pre-renal factors for optimal perfusion and volume status  - Trend BMP, lytes daily, replace as needed  - continue Strict I/Os, avoid nephrotoxins    ====================GI====================  #Diet  -Soft and bite sized     #GERD  - c/w home protonix    # Bowel regimen  - miralax and senna d/c'd iso loose stools- trend quantity and quality of BMs    ====================ENDO====================  #Type 2 DM  - A1c 8.3, sugars uncontrolled on admission likely stress induced s/p cardiac arrest  - insulin gtt transitioned off 11/11 AM  - lantus 8, premeal 8, low ISS    ====================HEME====================  - H/H and platelets stable    # VTE prophylaxis   - c/w heparin gtt given LV Thrombus and IABP     ====================ID====================  - afebrile, continue to monitor off abx  - concern for aspiration event prior to intubation on admission - s/p vanco and cefepime  - BCx 11/10 NGTD, RVP neg, MRSA neg, UA neg    #COVID  - Off airborne precautions 11/11    Pre-transplant ID w/u   - trend ID recs for serologies     LINES:   RIJ [place] 11/9-11/14, RAx Jacklyn 11/9, LFA IABP 11/9      Patient requires continuous monitoring with bedside rhythm monitoring, pulse ox monitoring, and intermittent blood gas analysis. Care plan discussed with ICU care team. Patient remained critical and at risk for life threatening decompensation.  Patient seen, examined and plan discussed with CCU team during rounds.     I have personally provided 35 minutes of critical care time excluding time spent on separate procedures, in addition to initial critical care time provided by the CICU Attending, Dr. Durand.    Rita Henry, [facility] ====================ASSESSMENT ==============  59 yo M with HTN, CAD (s/p PCI 2008), HFrEF, CVA 2018, and T2DM presenting with chest pressure and unknown tachycardia that was shocked x1, [facility] 11/1 found to have in-stent restenosis of pLAD and  of RCA with elevated RA and PA pressures and severely decreased EF 32%, admitted to CICU for management of cardiogenic shock requiring IABP 11/1 -11/7, with hospital course c/b vfib arrest.     PLAN  ====================NEURO====================  # Anxiety  - Ativan 0.5 mg PO Q 6hrs PRN   - No Seroquel or antipsychotics since vfib arrest and prolonged QTC  - Psych Eval last week, recs for continuing Ativan PRN and had recommended SSRI, but pt. refused     ====================PULM====================  # Acute Hypoxemic Respiratory Failure  - s/p 2 intubations for cardiogenic pulm edema and the in setting of cardiac arrest  - Extubated 11/10  - currently on room air with spO2 mid 90s    # COVID +  - off isolation: cleared by ID 11/11     # Asthma (hx of respiratory failure in 2018- intubated for 20 minutes per chart review pt report)  - c/w albuterol, symbicort and spiriva  - on trelegy at home    ====================CARDIOVASCULAR====================  # Vfib arrest insetting of ischemia vs. shock  - Lido off since 11/13   - PO Amio load dose (s/p IV amio ~1950mg lV gtt) for total of 5 grams per EP to complete 11/17  - Keep K > 4, Mag > 2.2     # Cardiogenic shock requiring IABP (11/1- 11/7, 11/9-)  - likely 2/2 NSTEMI and ADHF  - 11/1 LHC: pLAD 100 % in-stent restenosis & mRCA, 100 %. PCWP 30. IABP placed.  - 11/1 TTE: LV dilated. EF 32 %. Regional WMAs present, mod (grade 2) LV diastolic dysfunction  - 11/2 TTE: EF 22% and + LV thrombus   - IABP removed 11/7, vfib 11/9, IABP later reinserted that day for CS  - D/C Milrinone gtt 7:30 am 11/11  - Currently on hydralazine 100 TID and ISD 40 TID for AL reduction  - Holding diuretics at this time, PRN Bumex IVP as needed  - continue romel 25 daily for GDMT  - AT Eval, F/u HF recs    # NSTEMI- Stent re-occlusion of pLAD and 100%  of RCA  - EKG on admission w/LBBB  - DAPT: c/w ASA, Brilinta d/c'd per transplant w/u  - Cont. lipitor 80  - cMR deferred given necessity of IABP  - CT sx not recommending CABG, undergoing AT eval    # LV thrombus  - c/w heparin gtt    ====================/RENAL====================  # Non-oliguric ARIC   - sCr now improving. Baseline Cr: 1-1.22  - Diuresis held, optimize pre-renal factors for optimal perfusion and volume status  - Trend BMP, lytes daily, replace as needed  - Continue Strict I/Os, avoid nephrotoxins    ====================GI====================  #Diet  -Soft and bite sized     #GERD  - c/w home protonix    # Bowel regimen  - miralax and senna d/c'd iso loose stools- trend quantity and quality of BMs    ====================ENDO====================  #Type 2 DM  - A1c 8.3, sugars uncontrolled on admission likely stress induced s/p cardiac arrest  - insulin gtt transitioned off 11/11 AM  - lantus 8, premeal 8, low ISS    ====================HEME====================  - H/H and platelets stable    # VTE prophylaxis   - c/w heparin gtt given LV Thrombus and IABP     ====================ID====================  - afebrile, continue to monitor off abx  - concern for aspiration event prior to intubation on admission - s/p vanco and cefepime  - BCx 11/10 NGTD, RVP neg, MRSA neg, UA neg    #COVID  - Off airborne precautions 11/11    Pre-transplant ID w/u   - trend ID recs for serologies     LINES:   RIJ [place] 11/9-11/14, RAx Jacklyn 11/9, LFA IABP 11/9      Patient requires continuous monitoring with bedside rhythm monitoring, pulse ox monitoring, and intermittent blood gas analysis. Care plan discussed with ICU care team. Patient remained critical and at risk for life threatening decompensation.  Patient seen, examined and plan discussed with CCU team during rounds.     I have personally provided 35 minutes of critical care time excluding time spent on separate procedures, in addition to initial critical care time provided by the CICU Attending, Dr. Durand.    Rita Henry, [facility] ====================ASSESSMENT ==============  57 yo M with HTN, CAD (s/p PCI 2008), HFrEF, CVA 2018, and T2DM presenting with chest pressure and unknown tachycardia that was shocked x1, [facility] 11/1 found to have in-stent restenosis of pLAD and  of RCA with elevated RA and PA pressures and severely decreased EF 32%, admitted to CICU for management of cardiogenic shock requiring IABP 11/1 -11/7, with hospital course c/b vfib arrest.     PLAN  ====================NEURO====================  # Anxiety  - Ativan 0.5 mg PO Q 6hrs PRN   - No Seroquel or antipsychotics since vfib arrest and prolonged QTC  - Psych Eval last week, recs for continuing Ativan PRN and had recommended SSRI, but pt. refused     ====================PULM====================  # Acute Hypoxemic Respiratory Failure  - s/p 2 intubations for cardiogenic pulm edema and the in setting of cardiac arrest  - Extubated 11/10  - currently on room air with spO2 mid 90s    # COVID +  - off isolation: cleared by ID 11/11     # Asthma (hx of respiratory failure in 2018- intubated for 20 minutes per chart review pt report)  - c/w albuterol, symbicort and spiriva  - on trelegy at home    ====================CARDIOVASCULAR====================  # Vfib arrest insetting of ischemia vs. shock  - Lido off since 11/13   - PO Amio load dose (s/p IV amio ~1950mg lV gtt) for total of 5 grams per EP to complete 11/17  - Keep K > 4, Mag > 2.2     # Cardiogenic shock requiring IABP (11/1- 11/7, 11/9-)  - likely 2/2 NSTEMI and ADHF  - 11/1 LHC: pLAD 100 % in-stent restenosis & mRCA, 100 %. PCWP 30. IABP placed.  - 11/1 TTE: LV dilated. EF 32 %. Regional WMAs present, mod (grade 2) LV diastolic dysfunction  - 11/2 TTE: EF 22% and + LV thrombus   - IABP removed 11/7, vfib 11/9, IABP later reinserted that day for CS  - D/C Milrinone gtt 7:30 am 11/11  - Currently on hydralazine 100 TID and ISD 40 TID for AL reduction  - Holding diuretics at this time, PRN Bumex IVP as needed  - continue romel 25 daily for GDMT  - AT Eval, F/u HF recs    # NSTEMI- Stent re-occlusion of pLAD and 100%  of RCA  - EKG on admission w/LBBB  - DAPT: c/w ASA, Brilinta d/c'd per transplant w/u  - Cont. lipitor 80  - cMR deferred given necessity of IABP  - CT sx not recommending CABG, undergoing AT eval    # LV thrombus  - c/w heparin gtt    ====================/RENAL====================  # Non-oliguric ARIC   - sCr now improving. Baseline Cr: 1-1.22  - Diuresis held, optimize pre-renal factors for optimal perfusion and volume status  - Trend BMP, lytes daily, replace as needed  - Continue Strict I/Os, avoid nephrotoxins    ====================GI====================  #Diet  -Soft and bite sized     #GERD  - c/w home protonix    # Bowel regimen  - miralax and senna d/c'd iso loose stools- trend quantity and quality of BMs    ====================ENDO====================  #Type 2 DM  - A1c 8.3, sugars uncontrolled on admission likely stress induced s/p cardiac arrest  - insulin gtt transitioned off 11/11 AM  - lantus 8, premeal 8, low ISS    ====================HEME====================  - H/H and platelets stable    # VTE prophylaxis   - c/w heparin gtt given LV Thrombus and IABP     ====================ID====================  - afebrile, continue to monitor off abx  - concern for aspiration event prior to intubation on admission - s/p vanco and cefepime  - BCx 11/10 NGTD, RVP neg, MRSA neg, UA neg    #COVID  - Off airborne precautions 11/11    Pre-transplant ID w/u   - trend ID recs for serologies     LINES:   RIJ [place] 11/9-11/14, RAx Jacklyn 11/9, LFA IABP 11/9      Patient requires continuous monitoring with bedside rhythm monitoring, pulse ox monitoring, and intermittent blood gas analysis. Care plan discussed with ICU care team. Patient remained critical and at risk for life threatening decompensation.  Patient seen, examined and plan discussed with CCU team during rounds.       Rita Henry [facility]-BC

## 2023-11-15 DIAGNOSIS — Z71.89 OTHER SPECIFIED COUNSELING: ICD-10-CM

## 2023-11-15 DIAGNOSIS — Z51.5 ENCOUNTER FOR PALLIATIVE CARE: ICD-10-CM

## 2023-11-15 LAB
% ALBUMIN: 44.6 % — SIGNIFICANT CHANGE UP
% ALBUMIN: 44.6 % — SIGNIFICANT CHANGE UP
% ALPHA 1: 9.6 % — SIGNIFICANT CHANGE UP
% ALPHA 1: 9.6 % — SIGNIFICANT CHANGE UP
% ALPHA 2: 12.6 % — SIGNIFICANT CHANGE UP
% ALPHA 2: 12.6 % — SIGNIFICANT CHANGE UP
% BETA: 15.9 % — SIGNIFICANT CHANGE UP
% BETA: 15.9 % — SIGNIFICANT CHANGE UP
% GAMMA: 17.3 % — SIGNIFICANT CHANGE UP
% GAMMA: 17.3 % — SIGNIFICANT CHANGE UP
ALBUMIN SERPL ELPH-MCNC: 3 G/DL — LOW (ref 3.6–5.5)
ALBUMIN SERPL ELPH-MCNC: 3 G/DL — LOW (ref 3.6–5.5)
ALBUMIN SERPL ELPH-MCNC: 3.7 G/DL — SIGNIFICANT CHANGE UP (ref 3.3–5)
ALBUMIN SERPL ELPH-MCNC: 3.7 G/DL — SIGNIFICANT CHANGE UP (ref 3.3–5)
ALBUMIN/GLOB SERPL ELPH: 0.8 RATIO — SIGNIFICANT CHANGE UP
ALBUMIN/GLOB SERPL ELPH: 0.8 RATIO — SIGNIFICANT CHANGE UP
ALP SERPL-CCNC: 63 U/L — SIGNIFICANT CHANGE UP (ref 40–120)
ALP SERPL-CCNC: 63 U/L — SIGNIFICANT CHANGE UP (ref 40–120)
ALPHA1 GLOB SERPL ELPH-MCNC: 0.6 G/DL — HIGH (ref 0.1–0.4)
ALPHA1 GLOB SERPL ELPH-MCNC: 0.6 G/DL — HIGH (ref 0.1–0.4)
ALPHA2 GLOB SERPL ELPH-MCNC: 0.8 G/DL — SIGNIFICANT CHANGE UP (ref 0.5–1)
ALPHA2 GLOB SERPL ELPH-MCNC: 0.8 G/DL — SIGNIFICANT CHANGE UP (ref 0.5–1)
ALT FLD-CCNC: 78 U/L — HIGH (ref 10–45)
ALT FLD-CCNC: 78 U/L — HIGH (ref 10–45)
ANION GAP SERPL CALC-SCNC: 12 MMOL/L — SIGNIFICANT CHANGE UP (ref 5–17)
ANION GAP SERPL CALC-SCNC: 12 MMOL/L — SIGNIFICANT CHANGE UP (ref 5–17)
APTT BLD: 70.3 SEC — HIGH (ref 24.5–35.6)
APTT BLD: 70.3 SEC — HIGH (ref 24.5–35.6)
APTT BLD: 76.3 SEC — HIGH (ref 24.5–35.6)
APTT BLD: 76.3 SEC — HIGH (ref 24.5–35.6)
AST SERPL-CCNC: 44 U/L — HIGH (ref 10–40)
AST SERPL-CCNC: 44 U/L — HIGH (ref 10–40)
B-GLOBULIN SERPL ELPH-MCNC: 1.1 G/DL — HIGH (ref 0.5–1)
B-GLOBULIN SERPL ELPH-MCNC: 1.1 G/DL — HIGH (ref 0.5–1)
BASOPHILS # BLD AUTO: 0.07 K/UL — SIGNIFICANT CHANGE UP (ref 0–0.2)
BASOPHILS # BLD AUTO: 0.07 K/UL — SIGNIFICANT CHANGE UP (ref 0–0.2)
BASOPHILS NFR BLD AUTO: 0.6 % — SIGNIFICANT CHANGE UP (ref 0–2)
BASOPHILS NFR BLD AUTO: 0.6 % — SIGNIFICANT CHANGE UP (ref 0–2)
BILIRUB SERPL-MCNC: 0.6 MG/DL — SIGNIFICANT CHANGE UP (ref 0.2–1.2)
BILIRUB SERPL-MCNC: 0.6 MG/DL — SIGNIFICANT CHANGE UP (ref 0.2–1.2)
BUN SERPL-MCNC: 34 MG/DL — HIGH (ref 7–23)
BUN SERPL-MCNC: 34 MG/DL — HIGH (ref 7–23)
CALCIUM SERPL-MCNC: 9.6 MG/DL — SIGNIFICANT CHANGE UP (ref 8.4–10.5)
CALCIUM SERPL-MCNC: 9.6 MG/DL — SIGNIFICANT CHANGE UP (ref 8.4–10.5)
CHLORIDE SERPL-SCNC: 103 MMOL/L — SIGNIFICANT CHANGE UP (ref 96–108)
CHLORIDE SERPL-SCNC: 103 MMOL/L — SIGNIFICANT CHANGE UP (ref 96–108)
CO2 SERPL-SCNC: 19 MMOL/L — LOW (ref 22–31)
CO2 SERPL-SCNC: 19 MMOL/L — LOW (ref 22–31)
CREAT SERPL-MCNC: 1.76 MG/DL — HIGH (ref 0.5–1.3)
CREAT SERPL-MCNC: 1.76 MG/DL — HIGH (ref 0.5–1.3)
CULTURE RESULTS: SIGNIFICANT CHANGE UP
EGFR: 44 ML/MIN/1.73M2 — LOW
EGFR: 44 ML/MIN/1.73M2 — LOW
EOSINOPHIL # BLD AUTO: 0.32 K/UL — SIGNIFICANT CHANGE UP (ref 0–0.5)
EOSINOPHIL # BLD AUTO: 0.32 K/UL — SIGNIFICANT CHANGE UP (ref 0–0.5)
EOSINOPHIL NFR BLD AUTO: 2.5 % — SIGNIFICANT CHANGE UP (ref 0–6)
EOSINOPHIL NFR BLD AUTO: 2.5 % — SIGNIFICANT CHANGE UP (ref 0–6)
GAMMA GLOBULIN: 1.2 G/DL — SIGNIFICANT CHANGE UP (ref 0.6–1.6)
GAMMA GLOBULIN: 1.2 G/DL — SIGNIFICANT CHANGE UP (ref 0.6–1.6)
GLUCOSE BLDC GLUCOMTR-MCNC: 135 MG/DL — HIGH (ref 70–99)
GLUCOSE BLDC GLUCOMTR-MCNC: 135 MG/DL — HIGH (ref 70–99)
GLUCOSE BLDC GLUCOMTR-MCNC: 137 MG/DL — HIGH (ref 70–99)
GLUCOSE BLDC GLUCOMTR-MCNC: 137 MG/DL — HIGH (ref 70–99)
GLUCOSE BLDC GLUCOMTR-MCNC: 161 MG/DL — HIGH (ref 70–99)
GLUCOSE BLDC GLUCOMTR-MCNC: 161 MG/DL — HIGH (ref 70–99)
GLUCOSE BLDC GLUCOMTR-MCNC: 165 MG/DL — HIGH (ref 70–99)
GLUCOSE BLDC GLUCOMTR-MCNC: 165 MG/DL — HIGH (ref 70–99)
GLUCOSE SERPL-MCNC: 129 MG/DL — HIGH (ref 70–99)
GLUCOSE SERPL-MCNC: 129 MG/DL — HIGH (ref 70–99)
HCT VFR BLD CALC: 37.2 % — LOW (ref 39–50)
HCT VFR BLD CALC: 37.2 % — LOW (ref 39–50)
HDV AB SER-ACNC: NEGATIVE — SIGNIFICANT CHANGE UP
HDV AB SER-ACNC: NEGATIVE — SIGNIFICANT CHANGE UP
HGB BLD-MCNC: 12.2 G/DL — LOW (ref 13–17)
HGB BLD-MCNC: 12.2 G/DL — LOW (ref 13–17)
IMM GRANULOCYTES NFR BLD AUTO: 0.5 % — SIGNIFICANT CHANGE UP (ref 0–0.9)
IMM GRANULOCYTES NFR BLD AUTO: 0.5 % — SIGNIFICANT CHANGE UP (ref 0–0.9)
INR BLD: 1.26 RATIO — HIGH (ref 0.85–1.18)
INR BLD: 1.26 RATIO — HIGH (ref 0.85–1.18)
INR BLD: 1.36 RATIO — HIGH (ref 0.85–1.18)
INR BLD: 1.36 RATIO — HIGH (ref 0.85–1.18)
INTERPRETATION SERPL IFE-IMP: SIGNIFICANT CHANGE UP
INTERPRETATION SERPL IFE-IMP: SIGNIFICANT CHANGE UP
LYMPHOCYTES # BLD AUTO: 18 % — SIGNIFICANT CHANGE UP (ref 13–44)
LYMPHOCYTES # BLD AUTO: 18 % — SIGNIFICANT CHANGE UP (ref 13–44)
LYMPHOCYTES # BLD AUTO: 2.28 K/UL — SIGNIFICANT CHANGE UP (ref 1–3.3)
LYMPHOCYTES # BLD AUTO: 2.28 K/UL — SIGNIFICANT CHANGE UP (ref 1–3.3)
MAGNESIUM SERPL-MCNC: 2 MG/DL — SIGNIFICANT CHANGE UP (ref 1.6–2.6)
MAGNESIUM SERPL-MCNC: 2 MG/DL — SIGNIFICANT CHANGE UP (ref 1.6–2.6)
MCHC RBC-ENTMCNC: 28.8 PG — SIGNIFICANT CHANGE UP (ref 27–34)
MCHC RBC-ENTMCNC: 28.8 PG — SIGNIFICANT CHANGE UP (ref 27–34)
MCHC RBC-ENTMCNC: 32.8 GM/DL — SIGNIFICANT CHANGE UP (ref 32–36)
MCHC RBC-ENTMCNC: 32.8 GM/DL — SIGNIFICANT CHANGE UP (ref 32–36)
MCV RBC AUTO: 87.7 FL — SIGNIFICANT CHANGE UP (ref 80–100)
MCV RBC AUTO: 87.7 FL — SIGNIFICANT CHANGE UP (ref 80–100)
MONOCYTES # BLD AUTO: 0.81 K/UL — SIGNIFICANT CHANGE UP (ref 0–0.9)
MONOCYTES # BLD AUTO: 0.81 K/UL — SIGNIFICANT CHANGE UP (ref 0–0.9)
MONOCYTES NFR BLD AUTO: 6.4 % — SIGNIFICANT CHANGE UP (ref 2–14)
MONOCYTES NFR BLD AUTO: 6.4 % — SIGNIFICANT CHANGE UP (ref 2–14)
NEUTROPHILS # BLD AUTO: 9.11 K/UL — HIGH (ref 1.8–7.4)
NEUTROPHILS # BLD AUTO: 9.11 K/UL — HIGH (ref 1.8–7.4)
NEUTROPHILS NFR BLD AUTO: 72 % — SIGNIFICANT CHANGE UP (ref 43–77)
NEUTROPHILS NFR BLD AUTO: 72 % — SIGNIFICANT CHANGE UP (ref 43–77)
NRBC # BLD: 0 /100 WBCS — SIGNIFICANT CHANGE UP (ref 0–0)
NRBC # BLD: 0 /100 WBCS — SIGNIFICANT CHANGE UP (ref 0–0)
PHOSPHATE SERPL-MCNC: 3.3 MG/DL — SIGNIFICANT CHANGE UP (ref 2.5–4.5)
PHOSPHATE SERPL-MCNC: 3.3 MG/DL — SIGNIFICANT CHANGE UP (ref 2.5–4.5)
PLATELET # BLD AUTO: 262 K/UL — SIGNIFICANT CHANGE UP (ref 150–400)
PLATELET # BLD AUTO: 262 K/UL — SIGNIFICANT CHANGE UP (ref 150–400)
POTASSIUM SERPL-MCNC: 4.4 MMOL/L — SIGNIFICANT CHANGE UP (ref 3.5–5.3)
POTASSIUM SERPL-MCNC: 4.4 MMOL/L — SIGNIFICANT CHANGE UP (ref 3.5–5.3)
POTASSIUM SERPL-SCNC: 4.4 MMOL/L — SIGNIFICANT CHANGE UP (ref 3.5–5.3)
POTASSIUM SERPL-SCNC: 4.4 MMOL/L — SIGNIFICANT CHANGE UP (ref 3.5–5.3)
PROT PATTERN SERPL ELPH-IMP: SIGNIFICANT CHANGE UP
PROT PATTERN SERPL ELPH-IMP: SIGNIFICANT CHANGE UP
PROT SERPL-MCNC: 6.7 G/DL — SIGNIFICANT CHANGE UP (ref 6–8.3)
PROT SERPL-MCNC: 6.7 G/DL — SIGNIFICANT CHANGE UP (ref 6–8.3)
PROT SERPL-MCNC: 7.6 G/DL — SIGNIFICANT CHANGE UP (ref 6–8.3)
PROT SERPL-MCNC: 7.6 G/DL — SIGNIFICANT CHANGE UP (ref 6–8.3)
PROTHROM AB SERPL-ACNC: 13.8 SEC — HIGH (ref 9.5–13)
PROTHROM AB SERPL-ACNC: 13.8 SEC — HIGH (ref 9.5–13)
PROTHROM AB SERPL-ACNC: 14.2 SEC — HIGH (ref 9.5–13)
PROTHROM AB SERPL-ACNC: 14.2 SEC — HIGH (ref 9.5–13)
RBC # BLD: 4.24 M/UL — SIGNIFICANT CHANGE UP (ref 4.2–5.8)
RBC # BLD: 4.24 M/UL — SIGNIFICANT CHANGE UP (ref 4.2–5.8)
RBC # FLD: 14 % — SIGNIFICANT CHANGE UP (ref 10.3–14.5)
RBC # FLD: 14 % — SIGNIFICANT CHANGE UP (ref 10.3–14.5)
SODIUM SERPL-SCNC: 134 MMOL/L — LOW (ref 135–145)
SODIUM SERPL-SCNC: 134 MMOL/L — LOW (ref 135–145)
SPECIMEN SOURCE: SIGNIFICANT CHANGE UP
UFH PPP CHRO-ACNC: 0.76 IU/ML — HIGH (ref 0.3–0.7)
UFH PPP CHRO-ACNC: 0.76 IU/ML — HIGH (ref 0.3–0.7)
WBC # BLD: 12.65 K/UL — HIGH (ref 3.8–10.5)
WBC # BLD: 12.65 K/UL — HIGH (ref 3.8–10.5)
WBC # FLD AUTO: 12.65 K/UL — HIGH (ref 3.8–10.5)
WBC # FLD AUTO: 12.65 K/UL — HIGH (ref 3.8–10.5)

## 2023-11-15 PROCEDURE — 76981 USE PARENCHYMA: CPT | Mod: 26

## 2023-11-15 PROCEDURE — 90791 PSYCH DIAGNOSTIC EVALUATION: CPT

## 2023-11-15 PROCEDURE — 99497 ADVNCD CARE PLAN 30 MIN: CPT | Mod: 25

## 2023-11-15 PROCEDURE — 99292 CRITICAL CARE ADDL 30 MIN: CPT

## 2023-11-15 PROCEDURE — 71045 X-RAY EXAM CHEST 1 VIEW: CPT | Mod: 26

## 2023-11-15 PROCEDURE — 99291 CRITICAL CARE FIRST HOUR: CPT

## 2023-11-15 PROCEDURE — 99223 1ST HOSP IP/OBS HIGH 75: CPT

## 2023-11-15 PROCEDURE — 93010 ELECTROCARDIOGRAM REPORT: CPT

## 2023-11-15 RX ORDER — CARVEDILOL PHOSPHATE 80 MG/1
6.25 CAPSULE, EXTENDED RELEASE ORAL EVERY 12 HOURS
Refills: 0 | Status: ACTIVE | OUTPATIENT
Start: 2023-11-15 | End: 2024-10-13

## 2023-11-15 RX ORDER — INSULIN LISPRO 100/ML
VIAL (ML) SUBCUTANEOUS AT BEDTIME
Refills: 0 | Status: DISCONTINUED | OUTPATIENT
Start: 2023-11-15 | End: 2023-12-24

## 2023-11-15 RX ADMIN — Medication 100 MILLIGRAM(S): at 06:45

## 2023-11-15 RX ADMIN — AMIODARONE HYDROCHLORIDE 400 MILLIGRAM(S): 400 TABLET ORAL at 13:46

## 2023-11-15 RX ADMIN — Medication 1: at 17:51

## 2023-11-15 RX ADMIN — Medication 8 UNIT(S): at 12:43

## 2023-11-15 RX ADMIN — ISOSORBIDE DINITRATE 40 MILLIGRAM(S): 5 TABLET ORAL at 17:51

## 2023-11-15 RX ADMIN — CHLORHEXIDINE GLUCONATE 1 APPLICATION(S): 213 SOLUTION TOPICAL at 21:31

## 2023-11-15 RX ADMIN — Medication 81 MILLIGRAM(S): at 12:44

## 2023-11-15 RX ADMIN — SPIRONOLACTONE 25 MILLIGRAM(S): 25 TABLET, FILM COATED ORAL at 06:45

## 2023-11-15 RX ADMIN — ATORVASTATIN CALCIUM 80 MILLIGRAM(S): 80 TABLET, FILM COATED ORAL at 21:25

## 2023-11-15 RX ADMIN — AMIODARONE HYDROCHLORIDE 400 MILLIGRAM(S): 400 TABLET ORAL at 21:25

## 2023-11-15 RX ADMIN — INSULIN GLARGINE 8 UNIT(S): 100 INJECTION, SOLUTION SUBCUTANEOUS at 21:25

## 2023-11-15 RX ADMIN — BUDESONIDE AND FORMOTEROL FUMARATE DIHYDRATE 2 PUFF(S): 160; 4.5 AEROSOL RESPIRATORY (INHALATION) at 11:16

## 2023-11-15 RX ADMIN — BUDESONIDE AND FORMOTEROL FUMARATE DIHYDRATE 2 PUFF(S): 160; 4.5 AEROSOL RESPIRATORY (INHALATION) at 20:53

## 2023-11-15 RX ADMIN — AMIODARONE HYDROCHLORIDE 400 MILLIGRAM(S): 400 TABLET ORAL at 06:45

## 2023-11-15 RX ADMIN — Medication 8 UNIT(S): at 08:33

## 2023-11-15 RX ADMIN — ISOSORBIDE DINITRATE 40 MILLIGRAM(S): 5 TABLET ORAL at 10:47

## 2023-11-15 RX ADMIN — Medication 100 MILLIGRAM(S): at 13:47

## 2023-11-15 RX ADMIN — Medication 100 MILLIGRAM(S): at 21:25

## 2023-11-15 RX ADMIN — HEPARIN SODIUM 12 UNIT(S)/HR: 5000 INJECTION INTRAVENOUS; SUBCUTANEOUS at 08:00

## 2023-11-15 RX ADMIN — ISOSORBIDE DINITRATE 40 MILLIGRAM(S): 5 TABLET ORAL at 06:45

## 2023-11-15 RX ADMIN — CARVEDILOL PHOSPHATE 6.25 MILLIGRAM(S): 80 CAPSULE, EXTENDED RELEASE ORAL at 12:44

## 2023-11-15 NOTE — CONSULT NOTE ADULT - PROBLEM SELECTOR RECOMMENDATION 4
Palliative has seen patient in consultation for LVAD/OHT evaluation.     Patient has good insight into disease process and understands potential avenues for care including recovery, lvad and/or heart transplant.  He has a strong support system with his wife, three children and many friends. HCP for completed and copy placed in chart.    There are no barriers to implantation or transplantation from palliative perspective.      Barbie Edward MD  Geriatrics and Palliative Medicine Attending  [facility] pager: [phone]
- remains above baseline but overall improving  - Cr on admission 1.2, peaked to 4 on 11/10  - Support as above.

## 2023-11-15 NOTE — CONSULT NOTE ADULT - PROBLEM SELECTOR RECOMMENDATION 2
- PMVT into VF arrest 11/8, 3 rounds with shocks  - EP following  - remains on lido gtt @ 0.5 and PO Amio
management per CT surgery
-chronic total occlusion of LAD and RCA,  - Currently ASA, Atorvastatin 80mg and Brilinta

## 2023-11-15 NOTE — PROGRESS NOTE ADULT - ASSESSMENT
57 yo male h/o htn, cad s/p pci, ICH, here with NSTEMI  s/p intubation and cath. now in CCU    NSTEMI  s/p  cath  cath results noted. multi vessel dz., CTSx f/u.   hep gtt  pt with vtach/fib arrest again on 11/8. s/p ACLS and ROSC.   now extubated  IABP in place  mngt as per CCU  plan for transplant vs LVAD as per HF and transplant team    LV thrombus   hep gtt    acute on chronic systolic heart failure  heart failure team f/u      pna  recently diagnosed outpt  iv abx now stopped  f/u cult   id following     covid  supportive care    h/o ICH  resolved    agitation  psy consult f/u    mngt as per CCU team  d/w CCU attn    d/w pt and pts wife bedside       Advanced care planning was discussed with patient and family.  Advanced care planning forms were reviewed and discussed as appropriate.  Differential diagnosis and plan of care discussed with patient after the evaluation.   Pain assessed and judicious use of narcotics when appropriate was discussed.  Importance of Fall prevention discussed.  Counseling on Smoking and Alcohol cessation was offered when appropriate.  Counseling on Diet, exercise, and medication compliance was done.       Approx 75 minutes spent.

## 2023-11-15 NOTE — CONSULT NOTE ADULT - SUBJECTIVE AND OBJECTIVE BOX
HPI:  57yo M w/ hx HTN, CAD w/ 1 stent in 2009, ICH (2008) presenting with abn ekg. Patient presented to [facility] where he was found to have STEMI, recommended to get cath however patient did not want to get it there so it left and came here.  Patient initially had cough, congestion, fever, was placed on antibiotics on Sunday.  Started feeling nauseous and had a presyncopal event after which he presented to ED last night.  Had chest pain as well.  Chest pain is midsternal.  Not currently having chest pain.  Received 4 aspirin 30 min pta. (01 Nov 2023 15:11)      What has the CHF team told them:  CHF team has told him that he has advanced heart failure and if his heart function does not improve with current management, he may need advanced therapies.    What has the CHF team told them about LVAD:  The CHF team has told him that LVAD is a major procedure that involves opening of the chest wall and insertion of a device with leads that go outside of the body. The LVAD team explained that the patient would be unable to participate in certain activities such as contact sports and water immersion (swimming). He is also aware of the risk of infection with an LVAD and is aware he would have to keep the site very clean. He was also told that the LVAD is battery powered and he would be required to change the batteries regularly.     What is their understanding of LVAD:  The patient has a good understanding of the structure and function of an LVAD. He understands that the LVAD is like a mechanical pump that pumps blood to do the work of his heart. He knows It will be located both inside and outside his body, and can be used as a "bridge" to hopefully getting a heart transplant. He was able to teach back what the Heart Failure and CT ICU teams had taught him about LVAD maintenance.       What are their thoughts of living with an LVAD, and their understanding of how it may affect ADLs:  He shared that he initially struggled with the idea of living with an LVAD given the limitations as noted above. However, he understands that this device is lifesaving and necessary for him and is willing to do whatever is needed to get him back home with his family.    What is their understanding of heart transplant:   He understands that heart transplant is a major surgery and he would be required to be on life-long immunosuppression which require him from abstaining from certain foods and activities.       What is their social situation: Employed [x] Retired [] Who resides at home: wife and three children    Who will care for patient should he or she have an LVAD: His wife and three children. Willing to hire additional help if needed.    PERTINENT PM/SXH:   HTN (hypertension)    CAD (coronary artery disease)    Intracranial hemorrhage    Respiratory arrest    Myocardial infarction, unspecified MI type, unspecified artery    History of coronary artery stent placement    FAMILY HISTORY:  Family history of meningitis (Sibling)  Brother, death at age 49 from complications of infection (June 2015)    Family history of hypertension in mother  Mother    Family Hx substance abuse [ ]yes [ ]no  ITEMS NOT CHECKED ARE NOT PRESENT    SOCIAL HISTORY:   Significant other/partner[ x]  Children[x ]  B[employer]ist/Spirituality:  Substance hx:  [ ]   Tobacco hx:  [ ]   Alcohol hx: [ ]   Home Opioid hx:  [ ] I-Stop Reference No:  Living Situation: [x ]Home  [ ]Long term care  [ ]Rehab [ ]Other    ADVANCE DIRECTIVES:    DNR/MOLST  [ ]  Living Will  [ ]   DECISION MAKER(s): Self  [ ] Health Care Proxy(s)  [ ] Surrogate(s)  [ ] Guardian           Name(s): Phone Number(s):    HCP form completed with patient.   Primary HCP is daughter Laly Hardy [phone]  Alternate HCP is friend Sergio Charles [phone]    BASELINE (I)ADL(s) (prior to admission):  [place]: [x ]Total  [ ] Moderate [ ]Dependent    Allergies    penicillins (Unknown)    Intolerances    MEDICATIONS  (STANDING):  aMIOdarone    Tablet   Oral   aMIOdarone    Tablet 400 milliGRAM(s) Oral every 8 hours  aspirin  chewable 81 milliGRAM(s) Oral daily  atorvastatin 80 milliGRAM(s) Oral at bedtime  budesonide  80 MICROgram(s)/formoterol 4.5 MICROgram(s) Inhaler 2 Puff(s) Inhalation two times a day  carvedilol 6.25 milliGRAM(s) Oral every 12 hours  chlorhexidine 2% Cloths 1 Application(s) Topical daily  dextrose 50% Injectable 25 Gram(s) IV Push once  dextrose 50% Injectable 12.5 Gram(s) IV Push once  heparin  Infusion 1600 Unit(s)/Hr (12 mL/Hr) IV Continuous <Continuous>  hydrALAZINE 100 milliGRAM(s) Oral three times a day  insulin glargine Injectable (LANTUS) 8 Unit(s) SubCutaneous at bedtime  insulin lispro (ADMELOG) corrective regimen sliding scale   SubCutaneous three times a day before meals  insulin lispro (ADMELOG) corrective regimen sliding scale   SubCutaneous at bedtime  insulin lispro Injectable (ADMELOG) 8 Unit(s) SubCutaneous three times a day before meals  isosorbide   dinitrate Tablet (ISORDIL) 40 milliGRAM(s) Oral three times a day  lidocaine   4% Patch 1 Patch Transdermal every 24 hours  sodium chloride 3%  Inhalation 4 milliLiter(s) Inhalation every 12 hours  spironolactone 25 milliGRAM(s) Oral daily    MEDICATIONS  (PRN):  guaiFENesin Oral Liquid (Sugar-Free) 100 milliGRAM(s) Oral every 6 hours PRN Cough  LORazepam     Tablet 0.5 milliGRAM(s) Oral every 6 hours PRN Anxiety    PRESENT SYMPTOMS: [ ]Unable to self-report  [ ] CPOT [ ] PAINADs [ ] RDOS  Source if other than patient:  [ ]Family   [ ]Team     Pain: [ ]yes [x ]no  QOL impact -   Location -                    Aggravating factors -  Quality -  Radiation -  Timing-  Severity (0-10 scale):  Minimal acceptable level (0-10 scale):     CPOT:    https://www.[email or web address]/getattachment/[email or web address]-[email or web address]/Critical-Care-Pain-Observation-Tool-(CPOT)    PAIN AD Score:   http://geriatrictoolkit.[email or web address]/painad.pdf (press ctrl +  left click to view)    Dyspnea:                           [ ]Mild [ ]Moderate [ ]Severe      RDOS:  0 to 2  minimal or no respiratory distress   3  mild distress  4 to 6 moderate distress  >7 severe distress  [email or web address]://[email or web address]/handouts/hen/Respiratory_Distress_Observation_Scale.pdf (Ctrl +  left click to view)     Anxiety:                             [ ]Mild [ ]Moderate [ ]Severe  Fatigue:                             [ ]Mild [ ]Moderate [ ]Severe  Nausea:                             [ ]Mild [ ]Moderate [ ]Severe  Loss of appetite:              [ ]Mild [ ]Moderate [ ]Severe  Constipation:                    [ ]Mild [ ]Moderate [ ]Severe    Other Symptoms:  [x ]All other review of systems negative     Palliative Performance Status Version 2:      80   %    http://[email or web address]/files/news/palliative_performance_scale_ppsv2.pdf  PHYSICAL EXAM:  Vital Signs Last 24 Hrs  T(C): 37 (15 Nov 2023 04:00), Max: 37 (15 Nov 2023 04:00)  T(F): 98.6 (15 Nov 2023 04:00), Max: 98.6 (15 Nov 2023 04:00)  HR: 98 (15 Nov 2023 13:00) (78 - 98)  BP: --  BP(mean): --  RR: 28 (15 Nov 2023 13:00) (20 - 32)  SpO2: 96% (15 Nov 2023 13:00) (94% - 97%)    Parameters below as of 15 Nov 2023 13:00  Patient On (Oxygen Delivery Method): room air     I&O's Summary    14 Nov 2023 07:01  -  15 Nov 2023 07:00  --------------------------------------------------------  IN: 1032 mL / OUT: 1640 mL / NET: -608 mL    15 Nov 2023 07:01  -  15 Nov 2023 14:37  --------------------------------------------------------  IN: 432 mL / OUT: 300 mL / NET: 132 mL      GENERAL:  [x]Alert  [x]Oriented x 3  [ ]Lethargic  [ ]Cachexia  [ ]Unarousable  [x]Verbal  [ ]Non-Verbal  Behavioral:   [ ]Anxiety  [ ]Delirium [ ]Agitation [ ]Other  HEENT:  [x]Normal   [ ]Dry mouth   [ ]ET Tube/Trach  [ ]Oral lesions  PULMONARY:   [x]Clear [ ]Tachypnea  [ ]Audible excessive secretions   [ ]Rhonchi        [ ]Right [ ]Left [ ]Bilateral  [ ]Crackles        [ ]Right [ ]Left [ ]Bilateral  [ ]Wheezing     [ ]Right [ ]Left [ ]Bilateral  [ ]Diminished BS [ ] Right [ ]Left [ ]Bilateral  CARDIOVASCULAR:    [x]Regular [ ]Irregular [ ]Tachy  [ ]Issa [ ]Murmur [ ]Other  GASTROINTESTINAL:  [x]Soft  [ ]Distended   [x]+BS  [x]Non tender [ ]Tender  [ ]PEG [ ]OGT/ NGT   Last BM:    GENITOURINARY:  [x]Normal [ ]Incontinent   [ ]Oliguria/Anuria   [ ][place]  MUSCULOSKELETAL:   [ ]Normal   [x]Weakness  [ ]Bed/Wheelchair bound [ ]Edema  NEUROLOGIC:   [x]No focal deficits  [ ] Cognitive impairment  [ ] Dysphagia [ ]Dysarthria [ ] Paresis [ ]Other   SKIN:   [x]Normal  [ ]Rash   [ ]Pressure ulcer(s) [ ]y [ ]n present on admission    CRITICAL CARE:  [x ] Shock Present  [ ]Septic [x ]Cardiogenic [ ]Neurologic [ ]Hypovolemic  [ ]  Vasopressors [ ]  Inotropes [x} IABP  [ ]Respiratory failure present [ ]Mechanical ventilation [ ]Non-invasive ventilatory support [ ]High flow    [ ]Acute  [ ]Chronic [ ]Hypoxic  [ ]Hypercarbic [ ]Other  [ ]Other organ failure     LABS:                        12.2   12.65 )-----------( 262      ( 15 Nov 2023 03:32 )             37.2   11-15    134<L>  |  103  |  34<H>  ----------------------------<  129<H>  4.4   |  19<L>  |  1.76<H>    Ca    9.6      15 Nov 2023 01:29  Phos  3.3     11-15  Mg     2.0     11-15    TPro  7.6  /  Alb  3.7  /  TBili  0.6  /  DBili  x   /  AST  44<H>  /  ALT  78<H>  /  AlkPhos  63  11-15  PT/INR - ( 15 Nov 2023 08:30 )   PT: 13.8 sec;   INR: 1.26 ratio         PTT - ( 15 Nov 2023 08:30 )  PTT:70.3 sec    Urinalysis Basic - ( 15 Nov 2023 01:29 )    Color: x / Appearance: x / SG: x / pH: x  Gluc: 129 mg/dL / Ketone: x  / Bili: x / Urobili: x   Blood: x / Protein: x / Nitrite: x   Leuk Esterase: x / RBC: x / WBC x   Sq Epi: x / Non Sq Epi: x / Bacteria: x      RADIOLOGY & ADDITIONAL STUDIES:  < from: US Duplex Arterial Lower Ext Ltd, Right (11.14.23 @ 14:45) >    ACC: [id number] EXAM:  US DPLX LWR EXT ARTS LTD RT   ORDERED BY: AZALIA MORA     PROCEDURE DATE:  11/14/2023          INTERPRETATION:  History: Intraaortic balloon pump placed via a right   inguinal approach, evaluate for right inguinal hematoma or pseudoaneurysm.    Impression: There is unimpeded flow through the right external iliac and   common femoral arteries, and there is unimpeded flow through the right   common femoral vein.    No hematoma, pseudoaneurysm or AV fistula is identified.    ---End of Report ---      ROME MARSH MD; Attending Interventional Radiologist  This document has been electronically signed. Nov 14 2023  3:02PM    < end of copied text >      PROTEIN CALORIE MALNUTRITION PRESENT: [ ]mild [ ]moderate [ ]severe [ ]underweight [ ]morbid obesity  https://www.andeal.org/vault/2440/web/files/ONC/Table_Clinical%20Characteristics%20to%20Document%20Malnutrition-White%20JV%20et%20al%998994.pdf    Height (cm): 175.3 (11-03-23 @ 15:48), 175.3 (03-13-23 @ 10:42), 175.3 (03-10-23 @ 17:04)  Weight (kg): 98.9 (11-03-23 @ 15:48), 88.5 (03-13-23 @ 10:42), 88.496 (03-10-23 @ 17:04)  BMI (kg/m2): 32.2 (11-03-23 @ 15:48), 28.8 (03-13-23 @ 10:42), 28.8 (03-10-23 @ 17:04)    [ ]PPSV2 < or = to 30% [ ]significant weight loss  [ ]poor nutritional intake  [ ]anasarca[ ]Artificial Nutrition      REFERRALS:   [ ]Chaplaincy  [ ]Hospice  [ ]Child Life  [ ]Social Work  [ ]Case management [ ]Holistic Therapy

## 2023-11-15 NOTE — PROGRESS NOTE ADULT - ASSESSMENT
====================ASSESSMENT ==============  57 yo male with HTN, CAD (s/p PCI 2008), HFrEF, CVA 2018, and T2DM presenting with chest pressure and unknown tachycardia that was shocked x1, [facility] 11/1 found to have in-stent restenosis of pLAD and  of RCA with elevated RA and PA pressures and severely decreased EF 32%, admitted to CICU for management of cardiogenic shock requiring IABP 11/1 -11/7, with hospital course c/b vfib arrest requiring reinsertion of IABP.     Plan:  ====================== NEUROLOGY=====================  # Anxiety  - Ativan 0.5 mg PO Q 6hrs PRN   - No Seroquel or antipsychotics since vfib arrest and prolonged QTC  - Psych Eval last week, recs for continuing Ativan PRN and had recommended SSRI, but pt. refused     # Pain 2/2 rib fractures post CPR    ==================== RESPIRATORY======================  Acute Hypoxemic Respiratory Failure  - s/p 2 intubations for cardiogenic pulm edema and the in setting of cardiac arrest  - Extubated 11/10  - currently on room air with spO2 mid 90s    COVID +  - off isolation: cleared by ID 11/11     Asthma (hx of respiratory failure in 2018- intubated for 20 minutes per chart review pt report)  - c/w albuterol, symbicort and spiriva  - on trelegy at home    ====================CARDIOVASCULAR==================  Vfib arrest insetting of ischemia vs. shock  - Lido off since 11/13   - PO Amio load dose (s/p IV amio ~1950mg lV gtt) for total of 5 grams per EP to complete 11/17  - Keep K > 4, Mag > 2.2     Cardiogenic shock requiring IABP (11/1- 11/7, 11/9-)  - likely 2/2 NSTEMI and ADHF  - 11/1 LHC: pLAD 100 % in-stent restenosis & mRCA, 100 %. PCWP 30. IABP placed.  - 11/1 TTE: LV dilated. EF 32 %. Regional WMAs present, mod (grade 2) LV diastolic dysfunction  - 11/2 TTE: EF 22% and + LV thrombus   - IABP removed 11/7, vfib 11/9, IABP later reinserted that day for CS  - D/C Milrinone gtt 7:30 am 11/11  - Currently on hydralazine 100 TID and ISD 40 TID for AL reduction  - Holding diuretics at this time, PRN Bumex IVP as needed  - continue romel 25 daily for GDMT  - AT Eval, F/u HF recs    NSTEMI- Stent re-occlusion of pLAD and 100%  of RCA  - EKG on admission w/LBBB  - DAPT: c/w ASA, Brilinta d/c'd per transplant w/u  - Cont. lipitor 80  - cMR deferred given necessity of IABP  - CT sx not recommending CABG, undergoing AT eval    LV thrombus  - c/w heparin gtt    ===================HEMATOLOGIC/ONC ===================  - H/H and platelets stable    VTE prophylaxis   - c/w heparin gtt given LV Thrombus and IABP     ===================== RENAL =========================  Non-oliguric ARIC   - sCr now improving. Baseline Cr: 1-1.22  - Diuresis held, optimize pre-renal factors for optimal perfusion and volume status  - Trend BMP, lytes daily, replace as needed  - Continue Strict I/Os, avoid nephrotoxins    ==================== GASTROINTESTINAL===================  Diet  -Soft and bite sized     GERD  - c/w home protonix    Bowel regimen  - miralax and senna d/c'd iso loose stools- trend quantity and quality of BMs    =======================    ENDOCRINE  =====================  Type 2 DM  - A1c 8.3, sugars uncontrolled on admission likely stress induced s/p cardiac arrest  - insulin gtt transitioned off 11/11 AM  - lantus 8, premeal 8, low ISS    ========================INFECTIOUS DISEASE================  - afebrile, continue to monitor off abx  - concern for aspiration event prior to intubation on admission - s/p vanco and cefepime  - BCx 11/10 NGTD, RVP neg, MRSA neg, UA neg    COVID  - Off airborne precautions 11/11    Pre-transplant ID w/u   - trend ID recs for serologies     LINES:   RIJ [place] 11/9-11/14, RAx [place] 11/9, LFA IABP 11/9      Patient requires continuous monitoring with bedside rhythm monitoring, pulse ox monitoring, and intermittent blood gas analysis. Care plan discussed with ICU care team. Patient remained critical and at risk for life threatening decompensation.  Patient seen, examined and plan discussed with CCU team during rounds.     I have personally provided 35 minutes of critical care time excluding time spent on separate procedures, in addition to initial critical care time provided by the CICU Attending, Dr. Durand.    By signing my name below, I, Rosalba Tapia, attest that this documentation has been prepared under the direction and in the presence of Rita Hawthorne NP   Electronically signed: Rosalba Phillips, 11-14-23 @ 19:24    I, Rita Hawthorne, personally performed the services described in this documentation. all medical record entries made by the eileenibe were at my direction and in my presence. I have reviewed the chart and agree that the record reflects my personal performance and is accurate and complete  Electronically signed: Rita Hawthorne NP      ====================ASSESSMENT ==============  57 yo male with HTN, CAD (s/p PCI 2008), HFrEF, CVA 2018, and T2DM presenting with chest pressure and unknown tachycardia that was shocked x1, [facility] 11/1 found to have in-stent restenosis of pLAD and  of RCA with elevated RA and PA pressures and severely decreased EF 32%, admitted to CICU for management of cardiogenic shock requiring IABP 11/1 -11/7, with hospital course c/b vfib arrest requiring reinsertion of IABP.     Plan:  ====================== NEUROLOGY=====================  # Anxiety  - Ativan 0.5 mg PO Q 6hrs PRN   - No Seroquel or antipsychotics since vfib arrest and prolonged QTC  - Psych Eval last week, recs for continuing Ativan PRN and had recommended SSRI, but pt. refused     # Pain 2/2 rib fractures post CPR  - c/w multimodal pain control w/ tylenol and lidoderm TD    ==================== RESPIRATORY======================  # Acute Hypoxemic Respiratory Failure  - s/p 2 intubations for cardiogenic pulm edema and the in setting of cardiac arrest  - Extubated 11/10  - currently on room air with spO2 mid 90s    # COVID +  - off isolation: cleared by ID 11/11     # Asthma (hx of respiratory failure in 2018- intubated for 20 minutes per chart review pt report)  - c/w albuterol, symbicort and spiriva  - on trelegy at home    ====================CARDIOVASCULAR==================  # Vfib arrest insetting of ischemia vs. shock  - Lido off since 11/13   - PO Amio load dose (s/p IV amio ~1950mg IV gtt) for total of 5 grams per EP to complete 11/17  - Keep K > 4, Mag > 2.2     # Cardiogenic shock requiring IABP (11/1- 11/7, 11/9-)  - likely 2/2 NSTEMI and ADHF  - 11/1 LHC: pLAD 100 % in-stent restenosis & mRCA, 100 %. PCWP 30. IABP placed.  - 11/1 TTE: LV dilated. EF 32 %. Regional WMAs present, mod (grade 2) LV diastolic dysfunction  - 11/2 TTE: EF 22% and + LV thrombus   - IABP removed 11/7, vfib 11/9, IABP later reinserted that day for CS  - D/C Milrinone gtt 7:30 am 11/11  - Currently on hydralazine 100 TID and ISD 40 TID for AL reduction  - Holding diuretics at this time, PRN Bumex IVP as needed  - Continue romel 25 daily for GDMT  - AT Eval, F/u HF recs    # NSTEMI- Stent re-occlusion of pLAD and 100%  of RCA  - EKG on admission w/LBBB  - DAPT: c/w ASA, Brilinta d/c'd per transplant w/u  - Cont. lipitor 80  - cMR deferred given necessity of IABP  - CT sx not recommending CABG, undergoing AT eval    # LV thrombus  - c/w heparin gtt    ===================HEMATOLOGIC/ONC ===================  - H/H and platelets stable    # VTE prophylaxis   - c/w heparin gtt given LV Thrombus and IABP     ===================== RENAL =========================  # Non-oliguric ARIC   - sCr now improving. Baseline Cr: 1-1.22  - Diuresis held, optimize pre-renal factors for optimal perfusion and volume status  - Trend BMP, lytes daily, replace as needed  - Continue Strict I/Os, avoid nephrotoxins    ==================== GASTROINTESTINAL===================  -Tolerating DASH diet    GERD  - c/w home protonix    Bowel regimen  - Miralax and senna d/c'd iso loose stools- trend quantity and quality of BMs    =======================    ENDOCRINE  =====================  Type 2 DM  - A1c 8.3, sugars uncontrolled on admission likely stress induced s/p cardiac arrest  - insulin gtt transitioned off 11/11 AM  - lantus 8, premeal 8, low ISS    ========================INFECTIOUS DISEASE================  - Afebrile, continue to monitor off abx  - Concern for aspiration event prior to intubation on admission - s/p vanco and cefepime  - BCx 11/10 NGTD, RVP neg, MRSA neg, UA neg  - Mild fluctuations in WBCs w/o fever and no left shift    COVID  - Off airborne precautions 11/11    Pre-transplant ID w/u   - trend ID recs for serologies     LINES:   RIJ [place] 11/9-11/14, RAx [place] 11/9, LFA IABP 11/9    Patient requires continuous monitoring with bedside rhythm monitoring, pulse ox monitoring, and intermittent blood gas analysis. Care plan discussed with ICU care team. Patient remained critical and at risk for life threatening decompensation.  Patient seen, examined and plan discussed with CCU team during rounds.     I have personally provided 35 minutes of critical care time excluding time spent on separate procedures, in addition to initial critical care time provided by the CICU Attending, Dr. Durand.    Rita Henry, [facility] ====================ASSESSMENT ==============  59 yo male with HTN, CAD (s/p PCI 2008), HFrEF, CVA 2018, and T2DM presenting with chest pressure and unknown tachycardia that was shocked x1, [facility] 11/1 found to have in-stent restenosis of pLAD and  of RCA with elevated RA and PA pressures and severely decreased EF 32%, admitted to CICU for management of cardiogenic shock requiring IABP 11/1 -11/7, with hospital course c/b vfib arrest requiring reinsertion of IABP.     Plan:  ====================== NEUROLOGY=====================  # Anxiety  - Ativan 0.5 mg PO Q 6hrs PRN   - No Seroquel or antipsychotics since vfib arrest and prolonged QTC  - Psych Eval last week, recs for continuing Ativan PRN and had recommended SSRI, but pt. refused     # Pain 2/2 rib fractures post CPR  - c/w multimodal pain control w/ tylenol and lidoderm TD    ==================== RESPIRATORY======================  # Acute Hypoxemic Respiratory Failure  - s/p 2 intubations for cardiogenic pulm edema and the in setting of cardiac arrest  - Extubated 11/10  - currently on room air with spO2 mid 90s    # COVID +  - off isolation: cleared by ID 11/11     # Asthma (hx of respiratory failure in 2018- intubated for 20 minutes per chart review pt report)  - c/w albuterol, symbicort and spiriva  - on trelegy at home    ====================CARDIOVASCULAR==================  # Vfib arrest insetting of ischemia vs. shock  - Lido off since 11/13   - PO Amio load dose (s/p IV amio ~1950mg IV gtt) for total of 5 grams per EP to complete 11/17  - Keep K > 4, Mag > 2.2     # Cardiogenic shock requiring IABP (11/1- 11/7, 11/9-)  - likely 2/2 NSTEMI and ADHF  - 11/1 LHC: pLAD 100 % in-stent restenosis & mRCA, 100 %. PCWP 30. IABP placed.  - 11/1 TTE: LV dilated. EF 32 %. Regional WMAs present, mod (grade 2) LV diastolic dysfunction  - 11/2 TTE: EF 22% and + LV thrombus   - IABP removed 11/7, vfib 11/9, IABP later reinserted that day for CS  - D/C Milrinone gtt 7:30 am 11/11  - Currently on hydralazine 100 TID and ISD 40 TID for AL reduction  - Holding diuretics at this time, PRN Bumex IVP as needed  - Continue romel 25 daily for GDMT  - AT Eval, F/u HF recs    # NSTEMI- Stent re-occlusion of pLAD and 100%  of RCA  - EKG on admission w/LBBB  - DAPT: c/w ASA, Brilinta d/c'd per transplant w/u  - Cont. lipitor 80  - cMR deferred given necessity of IABP  - CT sx not recommending CABG, undergoing AT eval    # LV thrombus  - c/w heparin gtt    ===================HEMATOLOGIC/ONC ===================  - H/H and platelets stable    # VTE prophylaxis   - c/w heparin gtt given LV Thrombus and IABP     ===================== RENAL =========================  # Non-oliguric ARIC   - sCr now improving. Baseline Cr: 1-1.22  - Diuresis held, optimize pre-renal factors for optimal perfusion and volume status  - Trend BMP, lytes daily, replace as needed  - Continue Strict I/Os, avoid nephrotoxins    ==================== GASTROINTESTINAL===================  -Tolerating DASH diet    GERD  - c/w home protonix    Bowel regimen  - Miralax and senna d/c'd iso loose stools- trend quantity and quality of BMs    =======================    ENDOCRINE  =====================  Type 2 DM  - A1c 8.3, sugars uncontrolled on admission likely stress induced s/p cardiac arrest  - insulin gtt transitioned off 11/11 AM  - lantus 8, premeal 8, low ISS    ========================INFECTIOUS DISEASE================  - Afebrile, continue to monitor off abx  - Concern for aspiration event prior to intubation on admission - s/p vanco and cefepime  - BCx 11/10 NGTD, RVP neg, MRSA neg, UA neg  - Mild fluctuations in WBCs w/o fever and no left shift    COVID  - Off airborne precautions 11/11    Pre-transplant ID w/u   - trend ID recs for serologies     LINES:   RIJ [place] 11/9-11/14, RAx [place] 11/9, LFA IABP 11/9    Patient requires continuous monitoring with bedside rhythm monitoring, pulse ox monitoring, and intermittent blood gas analysis. Care plan discussed with ICU care team. Patient remained critical and at risk for life threatening decompensation.  Patient seen, examined and plan discussed with CCU team during rounds.       Rita Henry, [facility]

## 2023-11-15 NOTE — PROGRESS NOTE ADULT - SUBJECTIVE AND OBJECTIVE BOX
PATIENT:  LD VALENCIA  [id number]    CHIEF COMPLAINT:  Patient is a 58y old male who presents with a chief complaint of Cardiac Arrest (14 Nov 2023 19:23)      INTERVAL HISTORY/OVERNIGHT EVENTS:      REVIEW OF SYSTEMS:    Constitutional:     [ ] negative [ ] fevers [ ] chills [ ] weight loss [ ] weight gain  HEENT:                  [ ] negative [ ] dry eyes [ ] eye irritation [ ] postnasal drip [ ] nasal congestion  CV:                         [ ] negative  [ ] chest pain [ ] orthopnea [ ] palpitations [ ] murmur  Resp:                     [ ] negative [ ] cough [ ] shortness of breath [ ] dyspnea [ ] wheezing [ ] sputum [ ] hemoptysis  GI:                          [ ] negative [ ] nausea [ ] vomiting [ ] diarrhea [ ] constipation [ ] abd pain [ ] dysphagia   :                        [ ] negative [ ] dysuria [ ] nocturia [ ] hematuria [ ] increased urinary frequency  Musculoskeletal: [ ] negative [ ] back pain [ ] myalgias [ ] arthralgias [ ] fracture  Skin:                       [ ] negative [ ] rash [ ] itch  Neurological:        [ ] negative [ ] headache [ ] dizziness [ ] syncope [ ] weakness [ ] numbness  Psychiatric:           [ ] negative [ ] anxiety [ ] depression  Endocrine:            [ ] negative [ ] diabetes [ ] thyroid problem  Heme/Lymph:      [ ] negative [ ] anemia [ ] bleeding problem  Allergic/Immune: [ ] negative [ ] itchy eyes [ ] nasal discharge [ ] hives [ ] angioedema    MEDICATIONS:  MEDICATIONS  (STANDING):  aMIOdarone    Tablet 400 milliGRAM(s) Oral every 8 hours  aMIOdarone    Tablet   Oral   aspirin  chewable 81 milliGRAM(s) Oral daily  atorvastatin 80 milliGRAM(s) Oral at bedtime  benzonatate 100 milliGRAM(s) Oral once  budesonide  80 MICROgram(s)/formoterol 4.5 MICROgram(s) Inhaler 2 Puff(s) Inhalation two times a day  chlorhexidine 2% Cloths 1 Application(s) Topical daily  dextrose 50% Injectable 12.5 Gram(s) IV Push once  dextrose 50% Injectable 25 Gram(s) IV Push once  heparin  Infusion 1600 Unit(s)/Hr (12 mL/Hr) IV Continuous <Continuous>  hydrALAZINE 100 milliGRAM(s) Oral three times a day  insulin glargine Injectable (LANTUS) 8 Unit(s) SubCutaneous at bedtime  insulin lispro (ADMELOG) corrective regimen sliding scale   SubCutaneous three times a day before meals  insulin lispro Injectable (ADMELOG) 8 Unit(s) SubCutaneous three times a day before meals  isosorbide   dinitrate Tablet (ISORDIL) 40 milliGRAM(s) Oral three times a day  lidocaine   4% Patch 1 Patch Transdermal every 24 hours  sodium chloride 3%  Inhalation 4 milliLiter(s) Inhalation every 12 hours  spironolactone 25 milliGRAM(s) Oral daily    MEDICATIONS  (PRN):  acetaminophen     Tablet .. 975 milliGRAM(s) Oral every 6 hours PRN Mild Pain (1 - 3)  guaiFENesin Oral Liquid (Sugar-Free) 100 milliGRAM(s) Oral every 6 hours PRN Cough  LORazepam     Tablet 0.5 milliGRAM(s) Oral every 6 hours PRN Anxiety    ALLERGIES:  Allergies  penicillins (Unknown)    OBJECTIVE:  ICU Vital Signs Last 24 Hrs  T(C): 37 (15 Nov 2023 04:00), Max: 37 (15 Nov 2023 04:00)  T(F): 98.6 (15 Nov 2023 04:00), Max: 98.6 (15 Nov 2023 04:00)  HR: 78 (15 Nov 2023 06:00) (78 - 99)  ABP: 119/52 (15 Nov 2023 06:00) (108/57 - 139/71)  ABP(mean): 92 (15 Nov 2023 06:00) (84 - 107)  RR: 20 (15 Nov 2023 06:00) (20 - 32)  SpO2: 95% (15 Nov 2023 06:00) (94% - 97%)    O2 Parameters below as of 15 Nov 2023 04:00  Patient On (Oxygen Delivery Method): room air    POCT Blood Glucose.: 127 mg/dL (14 Nov 2023 21:06)  POCT Blood Glucose.: 163 mg/dL (14 Nov 2023 17:04)  POCT Blood Glucose.: 205 mg/dL (14 Nov 2023 12:37)  POCT Blood Glucose.: 172 mg/dL (14 Nov 2023 09:27)    I&O's Summary    13 Nov 2023 07:01  -  14 Nov 2023 07:00  --------------------------------------------------------  IN: 2134.2 mL / OUT: 2100 mL / NET: 34.2 mL    14 Nov 2023 07:01  -  15 Nov 2023 06:54  --------------------------------------------------------  IN: 882 mL / OUT: 1640 mL / NET: -758 mL    PHYSICAL EXAMINATION:  General: WN/WD NAD  HEENT: PERRLA, EOMI, moist mucous membranes  Neurology: A&Ox3, nonfocal, MOISE x 4  Respiratory: CTA B/L, normal respiratory effort, no wheezes, crackles, rales  CV: RRR, S1S2, no murmurs, rubs or gallops  Abdominal: Soft, NT, ND +BS, Last BM  Extremities: No edema, + peripheral pulses  Incisions:   Tubes:    LABS:  ABG - ( 14 Nov 2023 01:55 )  pH, Arterial: 7.40  pH, Blood: x     /  pCO2: 30    /  pO2: 116   / HCO3: 19    / Base Excess: -5.2  /  SaO2: 99.2                   12.2   12.65 )-----------( 262      ( 15 Nov 2023 03:32 )             37.2     11-15    134<L>  |  103  |  34<H>  ----------------------------<  129<H>  4.4   |  19<L>  |  1.76<H>    Ca    9.6      15 Nov 2023 01:29  Phos  3.3     11-15  Mg     2.0     11-15    TPro  7.6  /  Alb  3.7  /  TBili  0.6  /  DBili  x   /  AST  44<H>  /  ALT  78<H>  /  AlkPhos  63  11-15    LIVER FUNCTIONS - ( 15 Nov 2023 01:29 )  Alb: 3.7 g/dL / Pro: 7.6 g/dL / ALK PHOS: 63 U/L / ALT: 78 U/L / AST: 44 U/L / GGT: x           PT/INR - ( 15 Nov 2023 01:33 )   PT: 14.2 sec;   INR: 1.36 ratio    PTT - ( 15 Nov 2023 01:33 )  PTT:76.3 sec    Urinalysis Basic - ( 15 Nov 2023 01:29 )    Color: x / Appearance: x / SG: x / pH: x  Gluc: 129 mg/dL / Ketone: x  / Bili: x / Urobili: x   Blood: x / Protein: x / Nitrite: x   Leuk Esterase: x / RBC: x / WBC x   Sq Epi: x / Non Sq Epi: x / Bacteria: x   PATIENT:  LD VALENCIA  [id number]    CHIEF COMPLAINT:  Patient is a 58y old male who presents with a chief complaint of Cardiac Arrest (14 Nov 2023 19:23)      INTERVAL HISTORY/OVERNIGHT EVENTS:      REVIEW OF SYSTEMS:    Constitutional:     [X] negative [ ] fevers [ ] chills [ ] weight loss [ ] weight gain  HEENT:                  [X] negative [ ] dry eyes [ ] eye irritation [ ] postnasal drip [ ] nasal congestion  CV:                         [X] negative  [ ] chest pain [ ] orthopnea [ ] palpitations [ ] murmur  Resp:                     [X] negative [ ] cough [ ] shortness of breath [ ] dyspnea [ ] wheezing [ ] sputum [ ] hemoptysis  GI:                          [X] negative [ ] nausea [ ] vomiting [ ] diarrhea [ ] constipation [ ] abd pain [ ] dysphagia   :                        [X] negative [ ] dysuria [ ] nocturia [ ] hematuria [ ] increased urinary frequency  Musculoskeletal: [X] negative [ ] back pain [ ] myalgias [ ] arthralgias [ ] fracture  Skin:                       [X] negative [ ] rash [ ] itch  Neurological:        [X] negative [ ] headache [ ] dizziness [ ] syncope [ ] weakness [ ] numbness  Psychiatric:           [X] negative [ ] anxiety [ ] depression    MEDICATIONS:  MEDICATIONS  (STANDING):  aMIOdarone    Tablet 400 milliGRAM(s) Oral every 8 hours  aMIOdarone    Tablet   Oral   aspirin  chewable 81 milliGRAM(s) Oral daily  atorvastatin 80 milliGRAM(s) Oral at bedtime  benzonatate 100 milliGRAM(s) Oral once  budesonide  80 MICROgram(s)/formoterol 4.5 MICROgram(s) Inhaler 2 Puff(s) Inhalation two times a day  chlorhexidine 2% Cloths 1 Application(s) Topical daily  dextrose 50% Injectable 12.5 Gram(s) IV Push once  dextrose 50% Injectable 25 Gram(s) IV Push once  heparin  Infusion 1600 Unit(s)/Hr (12 mL/Hr) IV Continuous <Continuous>  hydrALAZINE 100 milliGRAM(s) Oral three times a day  insulin glargine Injectable (LANTUS) 8 Unit(s) SubCutaneous at bedtime  insulin lispro (ADMELOG) corrective regimen sliding scale   SubCutaneous three times a day before meals  insulin lispro Injectable (ADMELOG) 8 Unit(s) SubCutaneous three times a day before meals  isosorbide   dinitrate Tablet (ISORDIL) 40 milliGRAM(s) Oral three times a day  lidocaine   4% Patch 1 Patch Transdermal every 24 hours  sodium chloride 3%  Inhalation 4 milliLiter(s) Inhalation every 12 hours  spironolactone 25 milliGRAM(s) Oral daily    MEDICATIONS  (PRN):  acetaminophen     Tablet .. 975 milliGRAM(s) Oral every 6 hours PRN Mild Pain (1 - 3)  guaiFENesin Oral Liquid (Sugar-Free) 100 milliGRAM(s) Oral every 6 hours PRN Cough  LORazepam     Tablet 0.5 milliGRAM(s) Oral every 6 hours PRN Anxiety    ALLERGIES:  Allergies  penicillins (Unknown)    OBJECTIVE:  ICU Vital Signs Last 24 Hrs  T(C): 37 (15 Nov 2023 04:00), Max: 37 (15 Nov 2023 04:00)  T(F): 98.6 (15 Nov 2023 04:00), Max: 98.6 (15 Nov 2023 04:00)  HR: 78 (15 Nov 2023 06:00) (78 - 99)  ABP: 119/52 (15 Nov 2023 06:00) (108/57 - 139/71)  ABP(mean): 92 (15 Nov 2023 06:00) (84 - 107)  RR: 20 (15 Nov 2023 06:00) (20 - 32)  SpO2: 95% (15 Nov 2023 06:00) (94% - 97%)    O2 Parameters below as of 15 Nov 2023 04:00  Patient On (Oxygen Delivery Method): room air    POCT Blood Glucose.: 127 mg/dL (14 Nov 2023 21:06)  POCT Blood Glucose.: 163 mg/dL (14 Nov 2023 17:04)  POCT Blood Glucose.: 205 mg/dL (14 Nov 2023 12:37)  POCT Blood Glucose.: 172 mg/dL (14 Nov 2023 09:27)    I&O's Summary    13 Nov 2023 07:01  -  14 Nov 2023 07:00  --------------------------------------------------------  IN: 2134.2 mL / OUT: 2100 mL / NET: 34.2 mL    14 Nov 2023 07:01  -  15 Nov 2023 06:54  --------------------------------------------------------  IN: 882 mL / OUT: 1640 mL / NET: -758 mL    PHYSICAL EXAMINATION:  General: WN/WD NAD  HEENT: PERRL, EOMI, moist mucous membranes  Neurology: A&Ox3, nonfocal, MOISE x 4  Respiratory: CTA B/L, normal respiratory effort, no wheezes, crackles, rales  CV: RRR, S1S2, no murmurs, rubs or gallops  Abdominal: Soft, NT, ND +BS  Extremities: Warm and dry, no edema, + peripheral pulses    LABS:  ABG - ( 14 Nov 2023 01:55 )  pH, Arterial: 7.40  pH, Blood: x     /  pCO2: 30    /  pO2: 116   / HCO3: 19    / Base Excess: -5.2  /  SaO2: 99.2                   12.2   12.65 )-----------( 262      ( 15 Nov 2023 03:32 )             37.2     11-15    134<L>  |  103  |  34<H>  ----------------------------<  129<H>  4.4   |  19<L>  |  1.76<H>    Ca    9.6      15 Nov 2023 01:29  Phos  3.3     11-15  Mg     2.0     11-15    TPro  7.6  /  Alb  3.7  /  TBili  0.6  /  DBili  x   /  AST  44<H>  /  ALT  78<H>  /  AlkPhos  63  11-15    LIVER FUNCTIONS - ( 15 Nov 2023 01:29 )  Alb: 3.7 g/dL / Pro: 7.6 g/dL / ALK PHOS: 63 U/L / ALT: 78 U/L / AST: 44 U/L / GGT: x           PT/INR - ( 15 Nov 2023 01:33 )   PT: 14.2 sec;   INR: 1.36 ratio    PTT - ( 15 Nov 2023 01:33 )  PTT:76.3 sec    Urinalysis Basic - ( 15 Nov 2023 01:29 )    Color: x / Appearance: x / SG: x / pH: x  Gluc: 129 mg/dL / Ketone: x  / Bili: x / Urobili: x   Blood: x / Protein: x / Nitrite: x   Leuk Esterase: x / RBC: x / WBC x   Sq Epi: x / Non Sq Epi: x / Bacteria: x

## 2023-11-15 NOTE — PROGRESS NOTE ADULT - CRITICAL CARE ATTENDING COMMENT
History of anxiety disorder and CAD s/p PCI  Admitted with cardiogenic shock and respiratory failure in the setting of stent restenosis  Transferred out and had VT/VF cardiac arrest  Intubated during arrest, ROSC achieved, readmitted to CICU  A+Ox3, anxious - continue Ativan 0.5 q6h  Cardiogenic shock on IABP, off milrinone   Maintain IABP and afterload reduction  as d/w [facility] team will start coreg 6.25 mg BID   VT/VF arrest, on Amiodarone PO and Lidocaine infusion, wean lido as tolerated     Continue Amio  Continue with ASA  SpO2 mid 90s on 2L NC wean as tolerated   Soft diet  Non-oliguric ARIC that is improving, likely ATN from arrest  H/H low but acceptable on Heparin drip for LV thrombus  Afebrile, cultures no growth, no antibiotics   Sugars controlled on Lantus  LFA IABP, rangel 11/9.

## 2023-11-15 NOTE — CONSULT NOTE ADULT - PROBLEM SELECTOR RECOMMENDATION 3
HCP form reviewed and completed with patient. Copy placed in chart  Primary HCP is daughter Laly Hardy [phone]  Alternate HCP is friend Sergio Charles [phone]    20 minutes spent on ACP.
- pLAD 70 % stenosis in the ostium portion of the segment and 100 % in-stent restenosis. mRCA, 100 % stenosis.   - Currently ASA, Atorvastatin 80mg and Brilinta  - IABP precludes CMRI to assess for viability
-Currently intubated and sedated

## 2023-11-15 NOTE — PROGRESS NOTE ADULT - PROBLEM SELECTOR PLAN 1
- IABP at 1:1. On AC with Heparin infusion (also for LV thrombus)  - Start Coreg 6.25 mg BID for further afterload reduction, hold for MAP <65  - Continue HDZN 100 mg TID and ISDN 40 mg TID, hold for MAP <65  - Continue Spironolactone 25 mg QD  - PRN diuretics to target net even fluid balance  - AT evaluation: launched 11/10. GI on board, to determine need for colonoscopy vs colonography based on report of last colonoscopy which they are in the midst of obtaining.  - Please keep primary Dr. Banuelos [phone] in the loop per family request. - IABP at 1:1, placed 11/9. On AC with Heparin infusion (also for LV thrombus)  - Start Coreg 6.25 mg BID for further afterload reduction, hold for MAP <65  - Continue HDZN 100 mg TID and ISDN 40 mg TID, hold for MAP <65  - Continue Spironolactone 25 mg QD  - PRN diuretics to target net even fluid balance  - AT evaluation: launched 11/10. GI on board, to determine need for colonoscopy vs colonography based on report of last colonoscopy which they are in the midst of obtaining.  - Please keep primary Dr. Banuelos [phone] in the loop per family request.

## 2023-11-15 NOTE — PROGRESS NOTE ADULT - SUBJECTIVE AND OBJECTIVE BOX
Subjective:  - No SOB at rest, orthopnea, PND, CP or palpitations    Medications:  aMIOdarone    Tablet 400 milliGRAM(s) Oral every 8 hours  aMIOdarone    Tablet   Oral   aspirin  chewable 81 milliGRAM(s) Oral daily  atorvastatin 80 milliGRAM(s) Oral at bedtime  budesonide  80 MICROgram(s)/formoterol 4.5 MICROgram(s) Inhaler 2 Puff(s) Inhalation two times a day  carvedilol 6.25 milliGRAM(s) Oral every 12 hours  chlorhexidine 2% Cloths 1 Application(s) Topical daily  dextrose 50% Injectable 12.5 Gram(s) IV Push once  dextrose 50% Injectable 25 Gram(s) IV Push once  guaiFENesin Oral Liquid (Sugar-Free) 100 milliGRAM(s) Oral every 6 hours PRN  heparin  Infusion 1600 Unit(s)/Hr IV Continuous <Continuous>  hydrALAZINE 100 milliGRAM(s) Oral three times a day  insulin glargine Injectable (LANTUS) 8 Unit(s) SubCutaneous at bedtime  insulin lispro (ADMELOG) corrective regimen sliding scale   SubCutaneous at bedtime  insulin lispro (ADMELOG) corrective regimen sliding scale   SubCutaneous three times a day before meals  insulin lispro Injectable (ADMELOG) 8 Unit(s) SubCutaneous three times a day before meals  isosorbide   dinitrate Tablet (ISORDIL) 40 milliGRAM(s) Oral three times a day  lidocaine   4% Patch 1 Patch Transdermal every 24 hours  LORazepam     Tablet 0.5 milliGRAM(s) Oral every 6 hours PRN  sodium chloride 3%  Inhalation 4 milliLiter(s) Inhalation every 12 hours  spironolactone 25 milliGRAM(s) Oral daily    Physical Exam:    Vitals:  Vital Signs Last 24 Hours  T(C): 37 (11-15-23 @ 04:00), Max: 37 (11-15-23 @ 04:00)  HR: 98 (11-15-23 @ 13:00) (78 - 98)  BP: --  RR: 28 (11-15-23 @ 13:00) (20 - 32)  SpO2: 96% (11-15-23 @ 13:00) (94% - 97%)    Weight in k.5 (11-15 @ 06:00)    I&O's Summary    2023 07:01  -  15 Nov 2023 07:00  --------------------------------------------------------  IN: 1032 mL / OUT: 1640 mL / NET: -608 mL    15 Nov 2023 07:01  -  15 Nov 2023 13:24  --------------------------------------------------------  IN: 432 mL / OUT: 300 mL / NET: 132 mL    Tele: SR 90s    General: No distress. Comfortable.  HEENT: EOM intact.  Neck: Neck supple. JVP 6 cm H2O no HJR  Chest: Clear to auscultation bilaterally  CV: Normal S1 and S2. No murmurs, rub, or gallops. +2 DP pulse  Abdomen: Soft, non-distended, non-tender  Extremities: Warm peripherally. No edema   Skin: IABP in place   Neurology: Alert and oriented times three. Sensation intact  Psych: Affect normal    Labs:                        12.2   12.65 )-----------( 262      ( 15 Nov 2023 03:32 )             37.2     11-15    134<L>  |  103  |  34<H>  ----------------------------<  129<H>  4.4   |  19<L>  |  1.76<H>    Ca    9.6      15 Nov 2023 01:29  Phos  3.3     11-15  Mg     2.0     11-15    TPro  7.6  /  Alb  3.7  /  TBili  0.6  /  DBili  x   /  AST  44<H>  /  ALT  78<H>  /  AlkPhos  63  15    PT/INR - ( 15 Nov 2023 08:30 )   PT: 13.8 sec;   INR: 1.26 ratio         PTT - ( 15 Nov 2023 08:30 )  PTT:70.3 sec        Oxygen Saturation, Mixed: 74.3 ( @ 04:35)  Oxygen Saturation, Mixed: 78.7 ( @ 17:48)

## 2023-11-15 NOTE — PROGRESS NOTE ADULT - SUBJECTIVE AND OBJECTIVE BOX
LD VALENCIA  MRN-[id number]  Patient is a 58y old  Male who presents with a chief complaint of CP/SOB (15 Nov 2023 06:53)    HPI:  pt seen and approx 1:30 pm  in [facility]    59yo M w/ hx HTN, CAD w/ 1 stent in , ICH () presenting with abn ekg. Patient presented to [facility] where he was found to have STEMI, recommended to get cath however patient did not want to get it there so it left and came here.  Patient initially had cough, congestion, fever, was placed on antibiotics on .  Started feeling nauseous and had a presyncopal event after which he presented to ED last night.  Had chest pain as well.  Chest pain is midsternal.  Not currently having chest pain.  Received 4 aspirin 30 min pta. (2023 15:11)      Hospital Course:    24 HOUR EVENTS:    REVIEW OF SYSTEMS:    CONSTITUTIONAL: No weakness, fevers or chills  EYES/ENT: No visual changes;  No vertigo or throat pain   NECK: No pain or stiffness  RESPIRATORY: No cough, wheezing, hemoptysis; No shortness of breath  CARDIOVASCULAR: No chest pain or palpitations  GASTROINTESTINAL: No abdominal or epigastric pain. No nausea, vomiting, or hematemesis; No diarrhea or constipation. No melena or hematochezia.  GENITOURINARY: No dysuria, frequency or hematuria  NEUROLOGICAL: No numbness or weakness  SKIN: No itching, rashes      ICU Vital Signs Last 24 Hrs  T(C): 37.1 (15 Nov 2023 15:00), Max: 37.1 (15 Nov 2023 15:00)  T(F): 98.7 (15 Nov 2023 15:00), Max: 98.7 (15 Nov 2023 15:00)  HR: 83 (15 Nov 2023 19:00) (78 - 98)  BP: --  BP(mean): --  ABP: 128/81 (15 Nov 2023 19:00) (103/62 - 140/77)  ABP(mean): 107 (15 Nov 2023 19:00) (85 - 109)  RR: 23 (15 Nov 2023 19:00) (20 - 28)  SpO2: 95% (15 Nov 2023 19:00) (94% - 97%)    O2 Parameters below as of 15 Nov 2023 19:00  Patient On (Oxygen Delivery Method): room air            CVP(mm Hg): 2 (23 @ 23:00) (2 - 9)  CO: --  CI: --  PA: 32/4 (23 @ 23:00) (32/4 - 45/13)  PA(mean): 18 (23 @ 23:00) (18 - 30)  PA(direct): --  PCWP: --  LA: --  RA: --  SVR: --  SVRI: --  PVR: --  PVRI: --  I&O's Summary    2023 07:01  -  15 Nov 2023 07:00  --------------------------------------------------------  IN: 1032 mL / OUT: 1640 mL / NET: -608 mL    15 Nov 2023 07:01  -  15 Nov 2023 19:48  --------------------------------------------------------  IN: 864 mL / OUT: 850 mL / NET: 14 mL        CAPILLARY BLOOD GLUCOSE    CAPILLARY BLOOD GLUCOSE      POCT Blood Glucose.: 165 mg/dL (15 Nov 2023 17:50)      PHYSICAL EXAM:  GENERAL: No acute distress, well-developed  HEAD:  Atraumatic, Normocephalic  EYES: EOMI, PERRLA, conjunctiva and sclera clear  NECK: Supple, no lymphadenopathy, no JVD  CHEST/LUNG: CTAB; No wheezes, rales, or rhonchi  HEART: Regular rate and rhythm. Normal S1/S2. No murmurs, rubs, or gallops  ABDOMEN: Soft, non-tender, non-distended; normal bowel sounds, no organomegaly  EXTREMITIES:  2+ peripheral pulses b/l, No clubbing, cyanosis, or edema  NEUROLOGY: A&O x 3, no focal deficits  SKIN: No rashes or lesions    ============================I/O===========================   I&O's Detail    2023 07:01  -  15 Nov 2023 07:00  --------------------------------------------------------  IN:    Heparin: 302 mL    Oral Fluid: 730 mL  Total IN: 1032 mL    OUT:    Voided (mL): 1640 mL  Total OUT: 1640 mL    Total NET: -608 mL      15 Nov 2023 07:01  -  15 Nov 2023 19:48  --------------------------------------------------------  IN:    Heparin: 144 mL    Oral Fluid: 720 mL  Total IN: 864 mL    OUT:    Voided (mL): 850 mL  Total OUT: 850 mL    Total NET: 14 mL        ============================ LABS =========================                        12.2   12.65 )-----------( 262      ( 15 Nov 2023 03:32 )             37.2     11-15    134<L>  |  103  |  34<H>  ----------------------------<  129<H>  4.4   |  19<L>  |  1.76<H>    Ca    9.6      15 Nov 2023 01:29  Phos  3.3     11-15  Mg     2.0     -15    TPro  7.6  /  Alb  3.7  /  TBili  0.6  /  DBili  x   /  AST  44<H>  /  ALT  78<H>  /  AlkPhos  63  11-15                LIVER FUNCTIONS - ( 15 Nov 2023 01:29 )  Alb: 3.7 g/dL / Pro: 7.6 g/dL / ALK PHOS: 63 U/L / ALT: 78 U/L / AST: 44 U/L / GGT: x           PT/INR - ( 15 Nov 2023 08:30 )   PT: 13.8 sec;   INR: 1.26 ratio         PTT - ( 15 Nov 2023 08:30 )  PTT:70.3 sec  ABG - ( 2023 01:55 )  pH, Arterial: 7.40  pH, Blood: x     /  pCO2: 30    /  pO2: 116   / HCO3: 19    / Base Excess: -5.2  /  SaO2: 99.2              Blood Gas Arterial, Lactate: 1.1 mmol/L (23 @ 01:55)  Blood Gas Venous - Lactate: 1.3 mmol/L (23 @ 01:55)  Blood Gas Arterial, Lactate: 1.1 mmol/L (23 @ 04:35)  Blood Gas Venous - Lactate: 1.2 mmol/L (23 @ 04:35)    Urinalysis Basic - ( 15 Nov 2023 01:29 )    Color: x / Appearance: x / SG: x / pH: x  Gluc: 129 mg/dL / Ketone: x  / Bili: x / Urobili: x   Blood: x / Protein: x / Nitrite: x   Leuk Esterase: x / RBC: x / WBC x   Sq Epi: x / Non Sq Epi: x / Bacteria: x      ======================Micro/Rad/Cardio=================  Telemtry: Reviewed   EKG: Reviewed  CXR: Reviewed  Culture: Reviewed   Echo:   Cath: Cardiac Cath Lab - Adult:   [facility]  Department of Cardiology  [address] [place] [address]  [phone]  Cath Lab Report -- Comprehensive Report  Patient: LD VALENCIA  Study date: 2017  Account number: [id number]  MR number: [id number]  : 1965  Gender: Male  Race: W  Case Physician(s):  Jairon Holguin M.D.  Referring Physician:  Luc Lynn M.D.  INDICATIONS: Unstable angina - CCS4.  HISTORY: The patient has a history of coronary artery disease. The patient  hashypertension and medication-treated dyslipidemia.  PROCEDURE:  --  Left heart catheterization with ventriculography.  --  Left coronary angiography.  --  Right coronary angiography.  TECHNIQUE: The risks and alternatives of the procedures and conscious  sedation were explained to the patient and informed consent was obtained.  Cardiac catheterization performed electively.  Local anesthetic given. Right radial artery access. A 6FR PRELUDE KIT was  inserted in the vessel. Left heart catheterization. Ventriculography was  performed. A 5FR FR4.0 EXPO catheter was utilized. Left coronary artery  angiography. The vessel was injected utilizing a 5FR FL3.5 EXPO catheter.  Right coronary artery angiography. The vessel was injected utilizing a 5FR  FR4.0 EXPO catheter. RADIATION EXPOSURE: 1.1 min.  CONTRAST GIVEN: Omnipaque 55 ml.  MEDICATIONS GIVEN: Midazolam, 1 mg, IV. Fentanyl, 25 mcg, IV. Verapamil  (Isoptin, Calan, Covera), 2.5 mg, IA. Heparin, 3000 units, IA.  VENTRICLES: Global left ventricular function was moderately depressed. EF  estimated was 40 %.  CORONARY VESSELS: The coronary circulation is right dominant.  LM:   --  LM: Normal.  LAD:   --  Proximal LAD: There was a 50 % stenosis.  CX:   --  Circumflex: Normal.  RCA:   --  Mid RCA: There was a 40 % stenosis.  --  Distal RCA: There was a 50 % stenosis.  COMPLICATIONS: There were no complications.  DIAGNOSTIC RECOMMENDATIONS: The patient should continue with the present  medications.  Prepared and signed by  Jairon Holguin M.D.  Signed 2017 12:20:13  HEMODYNAMIC TABLES  Pressures:  Baseline/ Room Air  Pressures:  - HR: 78  Pressures:  - Rhythm:  Pressures:  -- Aortic Pressure (S/D/M): --/--/99  Pressures:  -- Left Ventricle (s/edp): 157/39/--  Outputs:  Baseline/ Room Air  Outputs:  -- CALCULATIONS[age]  Outputs:  -- CALCULATIONS: Body Surface Area: 2.05  Outputs:  -- CALCULATIONS: Height in cm: 175.00  Outputs:  -- CALCULATIONS: Sex: Male  Outputs:  -- CALCULATIONS: Weight in k.40 (17 @ 21:55)    ======================================================  PAST MEDICAL & SURGICAL HISTORY:  HTN (hypertension)      CAD (coronary artery disease)  ; stent      Intracranial hemorrhage        Respiratory arrest        Myocardial infarction, unspecified MI type, unspecified artery      History of coronary artery stent placement  ====================ASSESSMENT ==============  57 yo male with HTN, CAD (s/p PCI ), HFrEF, CVA , and T2DM presenting with chest pressure and unknown tachycardia that was shocked x1, [facility]  found to have in-stent restenosis of pLAD and  of RCA with elevated RA and PA pressures and severely decreased EF 32%, admitted to CICU for management of cardiogenic shock requiring IABP  -, with hospital course c/b vfib arrest requiring reinsertion of IABP.     Plan:  ====================== NEUROLOGY=====================  # Anxiety  - Ativan 0.5 mg PO Q 6hrs PRN   - No Seroquel or antipsychotics since vfib arrest and prolonged QTC  - Psych Eval last week, recs for continuing Ativan PRN and had recommended SSRI, but pt. refused     # Pain 2/2 rib fractures post CPR  - c/w multimodal pain control w/ tylenol and lidoderm TD    ==================== RESPIRATORY======================  # Acute Hypoxemic Respiratory Failure  - s/p 2 intubations for cardiogenic pulm edema and the in setting of cardiac arrest  - Extubated 11/10  - currently on room air with spO2 mid 90s    # COVID +  - off isolation: cleared by ID      # Asthma (hx of respiratory failure in 2018- intubated for 20 minutes per chart review pt report)  - c/w albuterol, symbicort and spiriva  - on trelegy at home    ====================CARDIOVASCULAR==================  # Vfib arrest insetting of ischemia vs. shock  - Lido off since    - PO Amio load dose (s/p IV amio ~1950mg IV gtt) for total of 5 grams per EP to complete   - Keep K > 4, Mag > 2.2     # Cardiogenic shock requiring IABP (- , -)  - likely 2/2 NSTEMI and ADHF  -  LHC: pLAD 100 % in-stent restenosis & mRCA, 100 %. PCWP 30. IABP placed.  -  TTE: LV dilated. EF 32 %. Regional WMAs present, mod (grade 2) LV diastolic dysfunction  -  TTE: EF 22% and + LV thrombus   - IABP removed , vfib , IABP later reinserted that day for CS  - D/C Milrinone gtt 7:30 am   - Currently on hydralazine 100 TID and ISD 40 TID for AL reduction  - Holding diuretics at this time, PRN Bumex IVP as needed  - Continue romel 25 daily for GDMT  - AT Eval, F/u HF recs    # NSTEMI- Stent re-occlusion of pLAD and 100%  of RCA  - EKG on admission w/LBBB  - DAPT: c/w ASA, Brilinta d/c'd per transplant w/u  - Cont. lipitor 80  - cMR deferred given necessity of IABP  - CT sx not recommending CABG, undergoing AT eval    # LV thrombus  - c/w heparin gtt    ===================HEMATOLOGIC/ONC ===================  - H/H and platelets stable    # VTE prophylaxis   - c/w heparin gtt given LV Thrombus and IABP     ===================== RENAL =========================  # Non-oliguric ARIC   - sCr now improving. Baseline Cr: 1-1.22  - Diuresis held, optimize pre-renal factors for optimal perfusion and volume status  - Trend BMP, lytes daily, replace as needed  - Continue Strict I/Os, avoid nephrotoxins    ==================== GASTROINTESTINAL===================  -Tolerating DASH diet    GERD  - c/w home protonix    Bowel regimen  - Miralax and senna d/c'd iso loose stools- trend quantity and quality of BMs    =======================    ENDOCRINE  =====================  Type 2 DM  - A1c 8.3, sugars uncontrolled on admission likely stress induced s/p cardiac arrest  - insulin gtt transitioned off  AM  - lantus 8, premeal 8, low ISS    ========================INFECTIOUS DISEASE================  - Afebrile, continue to monitor off abx  - Concern for aspiration event prior to intubation on admission - s/p vanco and cefepime  - BCx 11/10 NGTD, RVP neg, MRSA neg, UA neg  - Mild fluctuations in WBCs w/o fever and no left shift    COVID  - Off airborne precautions     Pre-transplant ID w/u   - trend ID recs for serologies     LINES:   RIJ [place] -, RAx Jacklyn , LFA IABP     Patient requires continuous monitoring with bedside rhythm monitoring, pulse ox monitoring, and intermittent blood gas analysis. Care plan discussed with ICU care team. Patient remained critical and at risk for life threatening decompensation.  Patient seen, examined and plan discussed with CCU team during rounds.     I have personally provided ____ minutes of critical care time excluding time spent on separate procedures, in addition to initial critical care time provided by the CICU Attending, Dr. Durand.    By signing my name below, I, Kalyn Sousa, attest that this documentation has been prepared under the direction and in the presence of Rita Hawthorne NP.  Electronically signed: Rosalba Booth, 11-15-23 @ 19:48    Rita HARTMAN , personally performed the services described in this documentation. all medical record entries made by the scribe were at my direction and in my presence. I have reviewed the chart and agree that the record reflects my personal performance and is accurate and complete  Electronically signed: Rita Hawthorne NP.       LD VALENCIA  MRN-[id number]  Patient is a 58y old  Male who presents with a chief complaint of CP/SOB (15 Nov 2023 06:53)    HPI: 58M w/ hx HTN, CAD w/ 1 stent in , ICH () presenting with abn ekg. Patient presented to [facility] where he was found to have STEMI, recommended to get cath however patient did not want to get it there so it left and came here.  Patient initially had cough, congestion, fever, was placed on antibiotics on .  Started feeling nauseous and had a presyncopal event after which he presented to ED last night.  Had chest pain as well.  Chest pain is midsternal.  Not currently having chest pain.  Received 4 aspirin 30 min pta. (2023 15:11)    REVIEW OF SYSTEMS:  CONSTITUTIONAL: No weakness, fevers or chills  EYES/ENT: No visual changes;  No vertigo or throat pain   NECK: No pain or stiffness  RESPIRATORY: No cough, wheezing, hemoptysis; No shortness of breath  CARDIOVASCULAR: No chest pain or palpitations  GASTROINTESTINAL: No abdominal or epigastric pain  No nausea, vomiting, or hematemesis; No diarrhea or constipation. No melena or hematochezia.  GENITOURINARY: No dysuria, frequency or hematuria  NEUROLOGICAL: No numbness or weakness  SKIN: No itching, rashes      ICU Vital Signs Last 24 Hrs  T(C): 37.1 (15 Nov 2023 15:00), Max: 37.1 (15 Nov 2023 15:00)  T(F): 98.7 (15 Nov 2023 15:00), Max: 98.7 (15 Nov 2023 15:00)  HR: 83 (15 Nov 2023 19:00) (78 - 98)  ABP: 128/81 (15 Nov 2023 19:00) (103/62 - 140/77)  ABP(mean): 107 (15 Nov 2023 19:00) (85 - 109)  RR: 23 (15 Nov 2023 19:00) (20 - 28)  SpO2: 95% (15 Nov 2023 19:00) (94% - 97%)    O2 Parameters below as of 15 Nov 2023 19:00  Patient On (Oxygen Delivery Method): room air      CVP(mm Hg): 2 (11-13-23 @ 23:00) (2 - 9)  PA: 32/4 (23 @ 23:00) (32/4 - 45/13)  PA(mean): 18 (23 @ 23:00) (18 - 30)      I&O's Summary    2023 07:  -  15 Nov 2023 07:00  --------------------------------------------------------  IN: 1032 mL / OUT: 1640 mL / NET: -608 mL    15 Nov 2023 07:  -  15 Nov 2023 19:48  --------------------------------------------------------  IN: 864 mL / OUT: 850 mL / NET: 14 mL      CAPILLARY BLOOD GLUCOSE  POCT Blood Glucose.: 165 mg/dL (15 Nov 2023 17:50)  ============================I/O===========================   I&O's Detail    2023 07:  -  15 Nov 2023 07:00  --------------------------------------------------------  IN:    Heparin: 302 mL    Oral Fluid: 730 mL  Total IN: 1032 mL    OUT:    Voided (mL): 1640 mL  Total OUT: 1640 mL    Total NET: -608 mL      15 Nov 2023 07:  -  15 Nov 2023 19:48  --------------------------------------------------------  IN:    Heparin: 144 mL    Oral Fluid: 720 mL  Total IN: 864 mL    OUT:    Voided (mL): 850 mL  Total OUT: 850 mL    Total NET: 14 mL  ============================ LABS =========================                     12.2   12.65 )-----------( 262      ( 15 Nov 2023 03:32 )             37.2     11-15    134<L>  |  103  |  34<H>  ----------------------------<  129<H>  4.4   |  19<L>  |  1.76<H>    Ca    9.6      15 Nov 2023 01:29  Phos  3.3     11-15  Mg     2.0     11-15    TPro  7.6  /  Alb  3.7  /  TBili  0.6  /  DBili  x   /  AST  44<H>  /  ALT  78<H>  /  AlkPhos  63  -15    LIVER FUNCTIONS - ( 15 Nov 2023 01:29 )  Alb: 3.7 g/dL / Pro: 7.6 g/dL / ALK PHOS: 63 U/L / ALT: 78 U/L / AST: 44 U/L / GGT: x           PT/INR - ( 15 Nov 2023 08:30 )   PT: 13.8 sec;   INR: 1.26 ratio    PTT - ( 15 Nov 2023 08:30 )  PTT:70.3 sec    ABG - ( 2023 01:55 )  pH, Arterial: 7.40  pH, Blood: x     /  pCO2: 30    /  pO2: 116   / HCO3: 19    / Base Excess: -5.2  /  SaO2: 99.2      Blood Gas Arterial, Lactate: 1.1 mmol/L (23 @ 01:55)  Blood Gas Venous - Lactate: 1.3 mmol/L (23 @ 01:55)  Blood Gas Arterial, Lactate: 1.1 mmol/L (23 @ 04:35)  Blood Gas Venous - Lactate: 1.2 mmol/L (23 @ 04:35)    Urinalysis Basic - ( 15 Nov 2023 01:29 )    Color: x / Appearance: x / SG: x / pH: x  Gluc: 129 mg/dL / Ketone: x  / Bili: x / Urobili: x   Blood: x / Protein: x / Nitrite: x   Leuk Esterase: x / RBC: x / WBC x   Sq Epi: x / Non Sq Epi: x / Bacteria: x  ======================Micro/Rad/Cardio=================  Telemtry: Reviewed   EKG: Reviewed  CXR: Reviewed  Culture: Reviewed   Echo:   Cath: Cardiac Cath Lab - Adult:   [facility]  Department of Cardiology  [address] [place] [address]  [phone]  Cath Lab Report -- Comprehensive Report  Patient: LD VALENCIA  Study date: 2017  Account number: [id number]  MR number: [id number]  : 1965  Gender: Male  Race: W  Case Physician(s):  Jairon Holguin M.D.  Referring Physician:  Luc Lynn M.D.  INDICATIONS: Unstable angina - CCS4.  HISTORY: The patient has a history of coronary artery disease. The patient  hashypertension and medication-treated dyslipidemia.  PROCEDURE:  --  Left heart catheterization with ventriculography.  --  Left coronary angiography.  --  Right coronary angiography.  TECHNIQUE: The risks and alternatives of the procedures and conscious  sedation were explained to the patient and informed consent was obtained.  Cardiac catheterization performed electively.  Local anesthetic given. Right radial artery access. A 6FR PRELUDE KIT was  inserted in the vessel. Left heart catheterization. Ventriculography was  performed. A 5FR FR4.0 EXPO catheter was utilized. Left coronary artery  angiography. The vessel was injected utilizing a 5FR FL3.5 EXPO catheter.  Right coronary artery angiography. The vessel was injected utilizing a 5FR  FR4.0 EXPO catheter. RADIATION EXPOSURE: 1.1 min.  CONTRAST GIVEN: Omnipaque 55 ml.  MEDICATIONS GIVEN: Midazolam, 1 mg, IV. Fentanyl, 25 mcg, IV. Verapamil  (Isoptin, Calan, Covera), 2.5 mg, IA. Heparin, 3000 units, IA.  VENTRICLES: Global left ventricular function was moderately depressed. EF  estimated was 40 %.  CORONARY VESSELS: The coronary circulation is right dominant.  LM:   --  LM: Normal.  LAD:   --  Proximal LAD: There was a 50 % stenosis.  CX:   --  Circumflex: Normal.  RCA:   --  Mid RCA: There was a 40 % stenosis.  --  Distal RCA: There was a 50 % stenosis.  COMPLICATIONS: There were no complications.  DIAGNOSTIC RECOMMENDATIONS: The patient should continue with the present  medications.  Prepared and signed by  Jairon Holguin M.D.  Signed 2017 12:20:13  HEMODYNAMIC TABLES  Pressures:  Baseline/ Room Air  Pressures:  - HR: 78  Pressures:  - Rhythm:  Pressures:  -- Aortic Pressure (S/D/M): --/--/99  Pressures:  -- Left Ventricle (s/edp): 157/39/--  Outputs:  Baseline/ Room Air  Outputs:  -- CALCULATIONS[age]  Outputs:  -- CALCULATIONS: Body Surface Area: 2.05  Outputs:  -- CALCULATIONS: Height in cm: 175.00  Outputs:  -- CALCULATIONS: Sex: Male  Outputs:  -- CALCULATIONS: Weight in k.40 (17 @ 21:55)  ======================================================  PAST MEDICAL & SURGICAL HISTORY:  HTN (hypertension)      CAD (coronary artery disease)  ; stent      Intracranial hemorrhage        Respiratory arrest        Myocardial infarction, unspecified MI type, unspecified artery      History of coronary artery stent placement    ====================ASSESSMENT =============  59 yo male with HTN, CAD (s/p PCI ), HFrEF, CVA , and T2DM presenting with chest pressure and unknown tachycardia that was shocked x1, [facility]  found to have in-stent restenosis of pLAD and  of RCA with elevated RA and PA pressures and severely decreased EF 32%, admitted to CICU for management of cardiogenic shock requiring IABP  -, with hospital course c/b vfib arrest requiring reinsertion of IABP.     Plan:  ====================== NEUROLOGY=====================  # Anxiety  - Ativan 0.5 mg PO Q 6hrs PRN   - No Seroquel or antipsychotics since vfib arrest and prolonged QTC  - Psych Eval last week, recs for continuing Ativan PRN and had recommended SSRI, but pt. refused     # Pain 2/2 rib fractures post CPR  - c/w multimodal pain control w/ tylenol and lidoderm TD    ==================== RESPIRATORY======================  # Acute Hypoxemic Respiratory Failure  - s/p 2 intubations for cardiogenic pulm edema and the in setting of cardiac arrest  - Extubated 11/10  - currently on room air with spO2 mid 90s    # COVID +  - off isolation: cleared by ID      # Asthma (hx of respiratory failure in 2018- intubated for 20 minutes per chart review pt report)  - c/w albuterol, symbicort and spiriva  - on trelegy at home    ====================CARDIOVASCULAR==================  # Vfib arrest insetting of ischemia vs. shock  - Lido off since    - PO Amio load dose (s/p IV amio ~1950mg IV gtt) for total of 5 grams per EP to complete   - Keep K > 4, Mag > 2.2     # Cardiogenic shock requiring IABP (- , -)  - likely 2/2 NSTEMI and ADHF  -  LHC: pLAD 100 % in-stent restenosis & mRCA, 100 %. PCWP 30. IABP placed.  -  TTE: LV dilated. EF 32 %. Regional WMAs present, mod (grade 2) LV diastolic dysfunction  -  TTE: EF 22% and + LV thrombus   - IABP removed , vfib , IABP later reinserted that day for CS  - D/C Milrinone gtt 7:30 am   - Currently on hydralazine 100 TID and ISD 40 TID for AL reduction  - Holding diuretics at this time, PRN Bumex IVP as needed  - Continue romel 25 daily for GDMT  - AT Eval, F/u HF recs    # NSTEMI- Stent re-occlusion of pLAD and 100%  of RCA  - EKG on admission w/LBBB  - DAPT: c/w ASA, Brilinta d/c'd per transplant w/u  - Cont. lipitor 80  - cMR deferred given necessity of IABP  - CT sx not recommending CABG, undergoing AT eval    # LV thrombus  - c/w heparin gtt    ===================HEMATOLOGIC/ONC ===================  - H/H and platelets stable    # VTE prophylaxis   - c/w heparin gtt given LV Thrombus and IABP     ===================== RENAL =========================  # Non-oliguric ARIC   - sCr now improving. Baseline Cr: 1-1.22  - Diuresis held, optimize pre-renal factors for optimal perfusion and volume status  - Trend BMP, lytes daily, replace as needed  - Continue Strict I/Os, avoid nephrotoxins    ==================== GASTROINTESTINAL===================  -Tolerating DASH diet    GERD  - c/w home protonix    Bowel regimen  - Miralax and senna d/c'd iso loose stools- trend quantity and quality of BMs    =======================    ENDOCRINE  =====================  Type 2 DM  - A1c 8.3, sugars uncontrolled on admission likely stress induced s/p cardiac arrest  - insulin gtt transitioned off 11/11 AM  - lantus 8, premeal 8, low ISS    ========================INFECTIOUS DISEASE================  - Afebrile, continue to monitor off abx  - Concern for aspiration event prior to intubation on admission - s/p vanco and cefepime  - BCx 11/10 NGTD, RVP neg, MRSA neg, UA neg  - Mild fluctuations in WBCs w/o fever and no left shift    COVID  - Off airborne precautions     Pre-transplant ID w/u   - trend ID recs for serologies     LINES:   RIJ [place] -, RAx Jacklyn , LFA IABP     Patient requires continuous monitoring with bedside rhythm monitoring, pulse ox monitoring, and intermittent blood gas analysis. Care plan discussed with ICU care team. Patient remained critical and at risk for life threatening decompensation.  Patient seen, examined and plan discussed with CCU team during rounds.     I have personally provided 35 minutes of critical care time excluding time spent on separate procedures, in addition to initial critical care time provided by the CICU Attending, Dr. Durand.    By signing my name below, I, Kalyn Sousa, attest that this documentation has been prepared under the direction and in the presence of Rita Hawthorne NP.  Electronically signed: Sangita Booth, 11-15-23 @ 19:48    I, Rita Hawthorne , personally performed the services described in this documentation. all medical record entries made by the sangita were at my direction and in my presence. I have reviewed the chart and agree that the record reflects my personal performance and is accurate and complete  Electronically signed: Rita Hawthorne NP.

## 2023-11-15 NOTE — PROGRESS NOTE ADULT - NS ATTEND AMEND GEN_ALL_CORE FT
Patient found in bed in NAD, he reports feeling well. His renal function is improving slowly. He remains hemodynamically stable on IABP support at 1:1. MAP >100 on high dose hydralazine/ISDN and spironolactone. He is undergoing work up for AT.     Continue IABP support   Continue hydralazine/ISDN   Continue spironolactone 6.25 mg BID   Start carvedilol 6.25 BID   Keep holding diuretics  Pending colonoscopy report and elastography for completion of AT work up   Discussed with CCU team

## 2023-11-15 NOTE — CONSULT NOTE ADULT - ASSESSMENT
57 yo M with HFrEF (LVIDd 6.4 cm, LVEF 32%), CAD s/p PCI (2008), HTN, DMT2 (A1c 8.3%) and CVA s/p TPA (2018), recently treated for PNA who initially presented to [facility] via EMS after syncope reportedly requiring defibrilation. Treated for ACS but left AMA to come to [facility]. On arrival ECG with ST depression in lateral leads. Intubated 11/1 for respiratory failure. Found to have COVID. L/RHC 11/1revealing  of LAD and RCA, elevated filling pressures and CI of 1.5 prompting placement of IABP. Extubated 11/3. IABP was weaned to off 11/7, then the following day on 11/8, developed PMVT cardiac arrest with prompt CPR and defibrillation, started on IV Lidocaine and Amio. IABP ultimately replaced on 11/9 for worsening hypotension. TTE 11/11 revealing LV thrombus. Palliative team consulted for LVAD/open heart transplant evaluation.

## 2023-11-15 NOTE — PROGRESS NOTE ADULT - ASSESSMENT
59 yo M with HFrEF (LVIDd 6.4 cm, LVEF 32%), CAD s/p PCI (2008), HTN, DMT2 (A1c 8.3%) and CVA s/p TPA (2018), recently treated for PNA who initially presented to [facility] via EMS after syncope reportedly requiring defibrilation. Treated for ACS but left AMA to come to [facility]. On arrival ECG with ST depression in lateral leads. Intubated 11/1 for respiratory failure. Found to have COVID. L/RHC 11/1revealing  of LAD and RCA, elevated filling pressures and CI of 1.5 prompting placement of IABP. Extubated 11/3. IABP was weaned to off 11/7, then the following day on 11/8, developed PMVT cardiac arrest with prompt CPR and defibrillation, started on IV Lidocaine and Amio. IABP ultimately replaced on 11/9 for worsening hypotension. TTE 11/11 revealing LV thrombus.     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 AT evaluation launched 11/10, he is ABO A. Currently well supported on IABP at 1:1 without further arrhythmias.      Cardiac Studies  ·	11/1123 TTE: LVEF 22% (global), LV thrombus, normal RV size and function, mild MR, trace TR  ·	11/1/23  LHC showed pLAD 70 % stenosis in the ostium portion of the segment and 100 % in-stent restenosis and in mRCA, 100 % stenosis.   ·	11/1/23 RHC; RA 23 Vw 24, PA 52/33/40, PCWP 30 Vw 33, AO 85/66/73, PA sat 54.4%, CO/CI (F) 2.96/1.44, IABP was placed during the cath through R common femoral artery.   ·	11/1/23 TTE: LVIDd 6.4cm , LVEF 32%, regional WMA, grade II DD,  TAPSE 1.7cm, mld MR, trace TR,     Bedside hemodynamics  11/3: IABP 1:1 RA 5; PA 27/12/18; CO/CI 5.6/2.6; MAP 90-100s.  11/4/23 IABP 1:3 CVP 4 PA 37/18 SvO2 83.8 CO/CI 11.2 CI 5.1  (in the setting of fever to 38.3C)  11/5 IABP 1:1 CVP 3 PA 48/27 SvO2 78.4% CO 8.2 CI 3.7   11/1 (IABP 1:1): CVP 1, PA 33/5/16, MvO2 74.3%

## 2023-11-15 NOTE — CONSULT NOTE ADULT - SUBJECTIVE AND OBJECTIVE BOX
Behavioral Cardiology Psychological Assessment    History of Present Illness: Mr. Hardy is a 58 year-old man with history of HTN, CAD (1 stent in 2009), ICH (2008) presented on 11/1 with abnormal EKG. Patient presented to [facility] where he was found to have STEMI, recommended to get cath however patient did not want treatment at OSH so left and came to [facility]. EKG here with ST depression in lateral leads and elevation in anterior leads. Prior to C found to be tachycardic, dyspneic, intubated. LHC 11/1 with chronic total occlusion of LAD and RCA, with elevated RA and PA pressures. TTE 11/1 with severely decreased EF 32%, s/p IABP 11/1.     Social history: Mr. Martin Hardy is a 58-year-old [place] American,  male with three children, including two sons and three daughters. 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 His wife, Brynn, works as an  in a public high school in [place]. Mr. Hardy reported three close friendships with men who live in [place]. Notably, one of these men, Dr. Brody Le, is Mr. Hardy’s healthcare proxy and PCP. Mr. Hadry also reported that these friends feel like family due to their closeness. Currently, Mr. Hardy lives in a condominium apartment in [place] with his wife and children. 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 Mr. Hardy noted his parents live in [place] as does one brother and one sister. Mr. Hardy has another sister, diagnosed with bipolar disorder, who lives in [place]. He described feeling distant from his siblings, as they lack a close relationship.      Substance use:    •	Tobacco: Denies current and past tobacco use.    •	Alcohol: Denies current and past alcohol use.   •	Drug: Denies current and past drug use.      Understanding of heart disease/advanced therapies: Reported he was diagnosed with heart failure during current hospitalization and advanced therapies were discussed. Mr. Hardy reported bad diet and genetics contributed to heart failure, and he reported a greater desire to receive an OHT than LVAD. He expressed understanding and willingness to follow transplant guidelines including need for lifelong immunosuppression medication, cardiac biopsies, and diet restrictions. With LVAD, he had limited understanding of battery management and driveline care, but he had full understanding of water restrictions. He needs review of risks/benefits of surgery.       Adherence to heart failure guidelines: New diagnosis of heart failure.     •	Medication:   •	Low Na diet: Receptive to following healthier diet.     •	Daily weight checks: New diagnosis.    •	Provider follow-up: Reports he consistently communicates with his PCP.      Support system: Mr. Hardy reported the presence of a reliable social support system. He described his wife as his primary support person. He also noted his three closest friends, Dr. Brody Le, Gerald, and Shay, are regularly in communication with him. Gerald reportedly has visited him on the weekends.      Past psychiatric history: Mr. Hardy denied any history of self-harm and suicidal ideation, plan, or attempt. He also denied any history of violent thoughts or behaviors. Denies history of outpatient treatment with a therapist or psychiatrist. With this said, he reported a history of odd thinking related, health-related anxiety. For example, Mr. Hardy reported meeting with a physician about 12 years ago because he feared having a brain tumor. He believed that the presence of a brain tumor could explain his superior memory. Earlier this year, he revealed anxiety to his PCP, who prescribed Mr. Hardy Lorazepam (0.5mg PRN). Per medical chart, Mr. Hardy filled his prescription three separate times in the past year. He reported concern about dependency, given Lorazepam’s status as a controlled medication, and desire to avoid substance abuse.      Psychological assessment: Using the PHQ-9 and the OLIVIA-7, Mr. Hardy endorsed mild depression and mild anxiety. These screeners were inconsistent with Mr. Hardy’s reported “severe anxiety” related to a “lack of control”, medical uncertainty, and existential concerns. He identifies as a responsible father, and he fears being unable to serve as a provider for and a protector of his children. He described these fears as contributing to interpersonal irritability; for instance, he experienced a panic attack during his current inpatient stay and expressed disapproval of a nurse who treated him during this moment of anxiety. He reported using deep breathing exercises to manage future panic attacks. Notably, Mr. Hardy also endorsed odd health-related anxiety. In particular, he asked this writer whether a donor heart from a mass murderer or child molester could change Mr. Hardy’s behavior following a heart transplant. Future clinical assessments should examine to what degree Mr. Hardy’s odd thinking related to health anxiety is culturally informed. While Mr. Hardy is open and reportedly committed to an OHT, he reported aversions to the insertion of an LVAD. He expressed less interest in using an LVAD, which can “control” his life. More, he described the LVAD as “low tech” and “barbaric.”       Risk assessment: low acute risk    Risk factors: chronic health issues, current anxiety   Protective factors: future oriented thinking, social support, no history of substance use, responsibility to family.      Mental Status Exam: Mr. Hardy was calm, cooperative, and well-groomed. He maintained good eye contact. His attitude was guarded; he refused to provide his children’s names to this writer, describing this writer’s questions as irrelevant to his health care. His speech was normal in content, rate, and tone. His mood was neutral, and his affect was irritable. For example, this writer asked Mr. Hardy about his health, and Mr. Hardy asked why these questions were related to the work of a psychologist. He subsequently apologized and described his comment as “involuntary”. Thought process was tangential and content was worried. He denied any history of self-harm and suicidal ideation, plan or attempt. He denied any history of homicidal ideation and aggression. Insight into psychiatric symptoms was variable, as he recognized causes of anxiety, but he demonstrated a tendency towards externally blaming this writer for his anxiety. Immediate judgement appeared limited.      Cognitive screen: Performed below normal range (24/30), suggesting mild cognitive impairment with possible difficulties in verbal fluency, encoding verbal information, and abstract thinking.       Impression: Mr. Hardy reported a strong social support network, consisting of his wife and several close friends. He reported never using cigarettes, alcohol, or drugs. He reported a strong interest in receiving an OHT, despite less interest in receiving an LVAD. He reported health-related anxiety, intolerance of uncertainty, existential concerns, and interpersonal irritability, which are buffered by his sense of responsibility as a father.      Dx: Adjustment Disorder with anxiety     SIPAT score = 13. No absolute psychological contraindications for pursuing heart transplant/LVAD with following recommendations:     ·	Psychology will continue to follow for supportive therapy and CBT strategies to manage anxiety.   ·	Maintain ongoing psychiatric follow-up.   ·	Holistic Nurse.   ·	Pet therapy if appropriate.   ·	LVAD and heart transplant support group information provided.       UNOS:    Education: Bachelor’s Degree in Engineering   : N/A   Citizen: American citizen     70 minutes spent on total patient encounter      Remy Jacobs MA   Psychology Extern       I have personally assessed this patient and agree with the student's assessment. I have made amendments to the documentation where necessary.      Karla Villalobos, PhD  Supervising Psychologist

## 2023-11-15 NOTE — PROGRESS NOTE ADULT - SUBJECTIVE AND OBJECTIVE BOX
LD VALENCIA  58y Male  MRN:[id number]    Patient is a 58y old  Male who presents with a chief complaint of NSTEMI (01 Nov 2023 20:29)    HPI:  57yo M w/ hx HTN, CAD w/ 1 stent in 2009, ICH (2008) presenting with abn ekg. Patient presented to [facility] where he was found to have STEMI, recommended to get cath however patient did not want to get it there so it left and came here.  Patient initially had cough, congestion, fever, was placed on antibiotics on Sunday.  Started feeling nauseous and had a presyncopal event after which he presented to ED last night.  Had chest pain as well.  Chest pain is midsternal.  Not currently having chest pain.  Received 4 aspirin 30 min pta. (01 Nov 2023 15:11)      Patient seen and evaluated at bedside in CCU. interval events noted    Interval HPI: remain in ccu. IABP in place     PAST MEDICAL & SURGICAL HISTORY:  HTN (hypertension)      CAD (coronary artery disease)  2009; stent      Intracranial hemorrhage  2008      Respiratory arrest  december 1st      Myocardial infarction, unspecified MI type, unspecified artery      History of coronary artery stent placement          REVIEW OF SYSTEMS:  as per hpi     VITALS:   ICU Vital Signs Last 24 Hrs  T(C): 37 (15 Nov 2023 04:00), Max: 37 (15 Nov 2023 04:00)  T(F): 98.6 (15 Nov 2023 04:00), Max: 98.6 (15 Nov 2023 04:00)  HR: 95 (15 Nov 2023 10:00) (78 - 99)  BP: --  BP(mean): --  ABP: 124/66 (15 Nov 2023 10:00) (103/62 - 139/71)  ABP(mean): 97 (15 Nov 2023 10:00) (85 - 105)  RR: 24 (15 Nov 2023 10:00) (20 - 32)  SpO2: 95% (15 Nov 2023 10:00) (94% - 97%)    O2 Parameters below as of 15 Nov 2023 09:00  Patient On (Oxygen Delivery Method): room air               PHYSICAL EXAM:  GENERAL: NAD, comfortable   HEAD:  Atraumatic, Normocephalic  EYES: EOMI, PERRLA, conjunctiva and sclera clear  NECK: Supple, No JVD   CHEST/LUNG: Clear to auscultation bilaterally; No wheeze  HEART: Regular rate and rhythm; No murmurs, rubs, or gallops  ABDOMEN: Soft, Nontender, Nondistended; Bowel sounds present  EXTREMITIES:  2+ Peripheral Pulses, No clubbing, cyanosis, or edema  NEUROLOGY: nonfocal  SKIN: No rashes or lesions  +iabp    Consultant(s) Notes Reviewed:  [x ] YES  [ ] NO  Care Discussed with Consultants/Other Providers [ x] YES  [ ] NO    MEDS:   MEDICATIONS  (STANDING):  aMIOdarone    Tablet 400 milliGRAM(s) Oral every 8 hours  aMIOdarone    Tablet   Oral   aspirin  chewable 81 milliGRAM(s) Oral daily  atorvastatin 80 milliGRAM(s) Oral at bedtime  budesonide  80 MICROgram(s)/formoterol 4.5 MICROgram(s) Inhaler 2 Puff(s) Inhalation two times a day  carvedilol 6.25 milliGRAM(s) Oral every 12 hours  chlorhexidine 2% Cloths 1 Application(s) Topical daily  dextrose 50% Injectable 25 Gram(s) IV Push once  dextrose 50% Injectable 12.5 Gram(s) IV Push once  heparin  Infusion 1600 Unit(s)/Hr (12 mL/Hr) IV Continuous <Continuous>  hydrALAZINE 100 milliGRAM(s) Oral three times a day  insulin glargine Injectable (LANTUS) 8 Unit(s) SubCutaneous at bedtime  insulin lispro (ADMELOG) corrective regimen sliding scale   SubCutaneous at bedtime  insulin lispro (ADMELOG) corrective regimen sliding scale   SubCutaneous three times a day before meals  insulin lispro Injectable (ADMELOG) 8 Unit(s) SubCutaneous three times a day before meals  isosorbide   dinitrate Tablet (ISORDIL) 40 milliGRAM(s) Oral three times a day  lidocaine   4% Patch 1 Patch Transdermal every 24 hours  sodium chloride 3%  Inhalation 4 milliLiter(s) Inhalation every 12 hours  spironolactone 25 milliGRAM(s) Oral daily    MEDICATIONS  (PRN):  guaiFENesin Oral Liquid (Sugar-Free) 100 milliGRAM(s) Oral every 6 hours PRN Cough  LORazepam     Tablet 0.5 milliGRAM(s) Oral every 6 hours PRN Anxiety    ALLERGIES:  penicillins (Unknown)      LABS:                            12.2   12.65 )-----------( 262      ( 15 Nov 2023 03:32 )             37.2   11-15    134<L>  |  103  |  34<H>  ----------------------------<  129<H>  4.4   |  19<L>  |  1.76<H>    Ca    9.6      15 Nov 2023 01:29  Phos  3.3     11-15  Mg     2.0     11-15    TPro  7.6  /  Alb  3.7  /  TBili  0.6  /  DBili  x   /  AST  44<H>  /  ALT  78<H>  /  AlkPhos  63  11-15       < from: Xray Chest 1 View- PORTABLE-Urgent (11.01.23 @ 07:42) >    IMPRESSION:  Clear lungs.    ---End of Report ---        < end of copied text >  < from: TTE W or WO Ultrasound Enhancing Agent (11.01.23 @ 10:23) >  _____________________________     CONCLUSIONS:      1. Left ventricular cavity is moderately dilated. Left ventricular wall thickness is normal. Left ventricular systolic function is severely decreased with an ejection fraction of 32 % by Chinchilla's method of disks. Regional wall motion abnormalities present.   2. Multiple segmental abnormalities exist. See findings.   3. There is moderate (grade 2) left ventricular diastolic dysfunction, with indeterminant filling pressure.   4. Normal right ventricular cavity size, wall thickness, and systolic function.   5. No significant valvular disease.   6. No pericardial effusion seen.   7. Compared to the transthoracic echocardiogram performed on 1/25/2017 the areas of akinesis are unchanged but there has been a decline in LV systolic function with new areas of hypokinesis.    __________________________________________________________________    < end of copied text >  < from: TTE Limited W or WO Ultrasound Enhancing Agent (11.02.23 @ 07:41) >  __________________________     CONCLUSIONS:      1. After obtaining consent, Definity ultrasound enhancing agent was given for enhanced left ventricular opacification and improved delineation of the left ventricular endocardial borders. Left ventricular systolic function is severely decreased with a calculated ejection fraction of 22 % by the Chinchilla's biplane method of disks. There is a left ventricular thrombus.   2. Findings were discussed with Litzy BOSS on 11/2/2023 at 8.49am.   3. There is a left ventricular thrombus.    _________________________________________________________________    < end of copied text >

## 2023-11-16 DIAGNOSIS — D72.829 ELEVATED WHITE BLOOD CELL COUNT, UNSPECIFIED: ICD-10-CM

## 2023-11-16 LAB
ALBUMIN SERPL ELPH-MCNC: 3.9 G/DL — SIGNIFICANT CHANGE UP (ref 3.3–5)
ALBUMIN SERPL ELPH-MCNC: 3.9 G/DL — SIGNIFICANT CHANGE UP (ref 3.3–5)
ALP SERPL-CCNC: 64 U/L — SIGNIFICANT CHANGE UP (ref 40–120)
ALP SERPL-CCNC: 64 U/L — SIGNIFICANT CHANGE UP (ref 40–120)
ALT FLD-CCNC: 73 U/L — HIGH (ref 10–45)
ALT FLD-CCNC: 73 U/L — HIGH (ref 10–45)
APTT BLD: 66.9 SEC — HIGH (ref 24.5–35.6)
APTT BLD: 66.9 SEC — HIGH (ref 24.5–35.6)
AST SERPL-CCNC: 36 U/L — SIGNIFICANT CHANGE UP (ref 10–40)
AST SERPL-CCNC: 36 U/L — SIGNIFICANT CHANGE UP (ref 10–40)
BASOPHILS # BLD AUTO: 0.06 K/UL — SIGNIFICANT CHANGE UP (ref 0–0.2)
BASOPHILS # BLD AUTO: 0.06 K/UL — SIGNIFICANT CHANGE UP (ref 0–0.2)
BASOPHILS NFR BLD AUTO: 0.5 % — SIGNIFICANT CHANGE UP (ref 0–2)
BASOPHILS NFR BLD AUTO: 0.5 % — SIGNIFICANT CHANGE UP (ref 0–2)
BILIRUB SERPL-MCNC: 0.6 MG/DL — SIGNIFICANT CHANGE UP (ref 0.2–1.2)
BILIRUB SERPL-MCNC: 0.6 MG/DL — SIGNIFICANT CHANGE UP (ref 0.2–1.2)
BUN SERPL-MCNC: 36 MG/DL — HIGH (ref 7–23)
BUN SERPL-MCNC: 36 MG/DL — HIGH (ref 7–23)
CALCIUM SERPL-MCNC: 9.9 MG/DL — SIGNIFICANT CHANGE UP (ref 8.4–10.5)
CALCIUM SERPL-MCNC: 9.9 MG/DL — SIGNIFICANT CHANGE UP (ref 8.4–10.5)
CHLORIDE SERPL-SCNC: 104 MMOL/L — SIGNIFICANT CHANGE UP (ref 96–108)
CHLORIDE SERPL-SCNC: 104 MMOL/L — SIGNIFICANT CHANGE UP (ref 96–108)
CO2 SERPL-SCNC: 17 MMOL/L — LOW (ref 22–31)
CO2 SERPL-SCNC: 17 MMOL/L — LOW (ref 22–31)
CREAT SERPL-MCNC: 1.64 MG/DL — HIGH (ref 0.5–1.3)
CREAT SERPL-MCNC: 1.64 MG/DL — HIGH (ref 0.5–1.3)
EGFR: 48 ML/MIN/1.73M2 — LOW
EGFR: 48 ML/MIN/1.73M2 — LOW
EOSINOPHIL # BLD AUTO: 0.32 K/UL — SIGNIFICANT CHANGE UP (ref 0–0.5)
EOSINOPHIL # BLD AUTO: 0.32 K/UL — SIGNIFICANT CHANGE UP (ref 0–0.5)
EOSINOPHIL NFR BLD AUTO: 2.7 % — SIGNIFICANT CHANGE UP (ref 0–6)
EOSINOPHIL NFR BLD AUTO: 2.7 % — SIGNIFICANT CHANGE UP (ref 0–6)
GLUCOSE BLDC GLUCOMTR-MCNC: 139 MG/DL — HIGH (ref 70–99)
GLUCOSE BLDC GLUCOMTR-MCNC: 139 MG/DL — HIGH (ref 70–99)
GLUCOSE BLDC GLUCOMTR-MCNC: 150 MG/DL — HIGH (ref 70–99)
GLUCOSE BLDC GLUCOMTR-MCNC: 150 MG/DL — HIGH (ref 70–99)
GLUCOSE BLDC GLUCOMTR-MCNC: 181 MG/DL — HIGH (ref 70–99)
GLUCOSE BLDC GLUCOMTR-MCNC: 181 MG/DL — HIGH (ref 70–99)
GLUCOSE BLDC GLUCOMTR-MCNC: 203 MG/DL — HIGH (ref 70–99)
GLUCOSE BLDC GLUCOMTR-MCNC: 203 MG/DL — HIGH (ref 70–99)
GLUCOSE SERPL-MCNC: 134 MG/DL — HIGH (ref 70–99)
GLUCOSE SERPL-MCNC: 134 MG/DL — HIGH (ref 70–99)
HCT VFR BLD CALC: 36.5 % — LOW (ref 39–50)
HCT VFR BLD CALC: 36.5 % — LOW (ref 39–50)
HGB BLD-MCNC: 12.1 G/DL — LOW (ref 13–17)
HGB BLD-MCNC: 12.1 G/DL — LOW (ref 13–17)
IMM GRANULOCYTES NFR BLD AUTO: 0.6 % — SIGNIFICANT CHANGE UP (ref 0–0.9)
IMM GRANULOCYTES NFR BLD AUTO: 0.6 % — SIGNIFICANT CHANGE UP (ref 0–0.9)
INR BLD: 1.24 RATIO — HIGH (ref 0.85–1.18)
INR BLD: 1.24 RATIO — HIGH (ref 0.85–1.18)
LACTATE SERPL-SCNC: 0.7 MMOL/L — SIGNIFICANT CHANGE UP (ref 0.5–2)
LACTATE SERPL-SCNC: 0.7 MMOL/L — SIGNIFICANT CHANGE UP (ref 0.5–2)
LYMPHOCYTES # BLD AUTO: 17.3 % — SIGNIFICANT CHANGE UP (ref 13–44)
LYMPHOCYTES # BLD AUTO: 17.3 % — SIGNIFICANT CHANGE UP (ref 13–44)
LYMPHOCYTES # BLD AUTO: 2.02 K/UL — SIGNIFICANT CHANGE UP (ref 1–3.3)
LYMPHOCYTES # BLD AUTO: 2.02 K/UL — SIGNIFICANT CHANGE UP (ref 1–3.3)
MAGNESIUM SERPL-MCNC: 1.9 MG/DL — SIGNIFICANT CHANGE UP (ref 1.6–2.6)
MAGNESIUM SERPL-MCNC: 1.9 MG/DL — SIGNIFICANT CHANGE UP (ref 1.6–2.6)
MCHC RBC-ENTMCNC: 29 PG — SIGNIFICANT CHANGE UP (ref 27–34)
MCHC RBC-ENTMCNC: 29 PG — SIGNIFICANT CHANGE UP (ref 27–34)
MCHC RBC-ENTMCNC: 33.2 GM/DL — SIGNIFICANT CHANGE UP (ref 32–36)
MCHC RBC-ENTMCNC: 33.2 GM/DL — SIGNIFICANT CHANGE UP (ref 32–36)
MCV RBC AUTO: 87.5 FL — SIGNIFICANT CHANGE UP (ref 80–100)
MCV RBC AUTO: 87.5 FL — SIGNIFICANT CHANGE UP (ref 80–100)
MONOCYTES # BLD AUTO: 0.7 K/UL — SIGNIFICANT CHANGE UP (ref 0–0.9)
MONOCYTES # BLD AUTO: 0.7 K/UL — SIGNIFICANT CHANGE UP (ref 0–0.9)
MONOCYTES NFR BLD AUTO: 6 % — SIGNIFICANT CHANGE UP (ref 2–14)
MONOCYTES NFR BLD AUTO: 6 % — SIGNIFICANT CHANGE UP (ref 2–14)
NEUTROPHILS # BLD AUTO: 8.51 K/UL — HIGH (ref 1.8–7.4)
NEUTROPHILS # BLD AUTO: 8.51 K/UL — HIGH (ref 1.8–7.4)
NEUTROPHILS NFR BLD AUTO: 72.9 % — SIGNIFICANT CHANGE UP (ref 43–77)
NEUTROPHILS NFR BLD AUTO: 72.9 % — SIGNIFICANT CHANGE UP (ref 43–77)
NRBC # BLD: 0 /100 WBCS — SIGNIFICANT CHANGE UP (ref 0–0)
NRBC # BLD: 0 /100 WBCS — SIGNIFICANT CHANGE UP (ref 0–0)
PHOSPHATE SERPL-MCNC: 3.2 MG/DL — SIGNIFICANT CHANGE UP (ref 2.5–4.5)
PHOSPHATE SERPL-MCNC: 3.2 MG/DL — SIGNIFICANT CHANGE UP (ref 2.5–4.5)
PLATELET # BLD AUTO: 264 K/UL — SIGNIFICANT CHANGE UP (ref 150–400)
PLATELET # BLD AUTO: 264 K/UL — SIGNIFICANT CHANGE UP (ref 150–400)
POTASSIUM SERPL-MCNC: 4.4 MMOL/L — SIGNIFICANT CHANGE UP (ref 3.5–5.3)
POTASSIUM SERPL-MCNC: 4.4 MMOL/L — SIGNIFICANT CHANGE UP (ref 3.5–5.3)
POTASSIUM SERPL-SCNC: 4.4 MMOL/L — SIGNIFICANT CHANGE UP (ref 3.5–5.3)
POTASSIUM SERPL-SCNC: 4.4 MMOL/L — SIGNIFICANT CHANGE UP (ref 3.5–5.3)
PROCALCITONIN SERPL-MCNC: 0.16 NG/ML — HIGH (ref 0.02–0.1)
PROCALCITONIN SERPL-MCNC: 0.16 NG/ML — HIGH (ref 0.02–0.1)
PROT SERPL-MCNC: 7.7 G/DL — SIGNIFICANT CHANGE UP (ref 6–8.3)
PROT SERPL-MCNC: 7.7 G/DL — SIGNIFICANT CHANGE UP (ref 6–8.3)
PROTHROM AB SERPL-ACNC: 13.6 SEC — HIGH (ref 9.5–13)
PROTHROM AB SERPL-ACNC: 13.6 SEC — HIGH (ref 9.5–13)
RBC # BLD: 4.17 M/UL — LOW (ref 4.2–5.8)
RBC # BLD: 4.17 M/UL — LOW (ref 4.2–5.8)
RBC # FLD: 13.8 % — SIGNIFICANT CHANGE UP (ref 10.3–14.5)
RBC # FLD: 13.8 % — SIGNIFICANT CHANGE UP (ref 10.3–14.5)
SODIUM SERPL-SCNC: 135 MMOL/L — SIGNIFICANT CHANGE UP (ref 135–145)
SODIUM SERPL-SCNC: 135 MMOL/L — SIGNIFICANT CHANGE UP (ref 135–145)
T CRUZI AB SER-ACNC: SIGNIFICANT CHANGE UP
T CRUZI AB SER-ACNC: SIGNIFICANT CHANGE UP
UFH PPP CHRO-ACNC: 0.63 IU/ML — SIGNIFICANT CHANGE UP (ref 0.3–0.7)
UFH PPP CHRO-ACNC: 0.63 IU/ML — SIGNIFICANT CHANGE UP (ref 0.3–0.7)
WBC # BLD: 11.68 K/UL — HIGH (ref 3.8–10.5)
WBC # BLD: 11.68 K/UL — HIGH (ref 3.8–10.5)
WBC # FLD AUTO: 11.68 K/UL — HIGH (ref 3.8–10.5)
WBC # FLD AUTO: 11.68 K/UL — HIGH (ref 3.8–10.5)

## 2023-11-16 PROCEDURE — 71045 X-RAY EXAM CHEST 1 VIEW: CPT | Mod: 26

## 2023-11-16 PROCEDURE — 99292 CRITICAL CARE ADDL 30 MIN: CPT

## 2023-11-16 PROCEDURE — 99291 CRITICAL CARE FIRST HOUR: CPT

## 2023-11-16 PROCEDURE — 90791 PSYCH DIAGNOSTIC EVALUATION: CPT

## 2023-11-16 PROCEDURE — 90832 PSYTX W PT 30 MINUTES: CPT

## 2023-11-16 PROCEDURE — 99232 SBSQ HOSP IP/OBS MODERATE 35: CPT

## 2023-11-16 PROCEDURE — 99233 SBSQ HOSP IP/OBS HIGH 50: CPT | Mod: GC

## 2023-11-16 PROCEDURE — 93010 ELECTROCARDIOGRAM REPORT: CPT

## 2023-11-16 RX ORDER — SOD SULF/SODIUM/NAHCO3/KCL/PEG
4000 SOLUTION, RECONSTITUTED, ORAL ORAL ONCE
Refills: 0 | Status: COMPLETED | OUTPATIENT
Start: 2023-11-16 | End: 2023-11-16

## 2023-11-16 RX ORDER — MAGNESIUM SULFATE 500 MG/ML
2 VIAL (ML) INJECTION ONCE
Refills: 0 | Status: COMPLETED | OUTPATIENT
Start: 2023-11-16 | End: 2023-11-16

## 2023-11-16 RX ORDER — CARVEDILOL PHOSPHATE 80 MG/1
12.5 CAPSULE, EXTENDED RELEASE ORAL EVERY 12 HOURS
Refills: 0 | Status: DISCONTINUED | OUTPATIENT
Start: 2023-11-16 | End: 2023-11-17

## 2023-11-16 RX ADMIN — ISOSORBIDE DINITRATE 40 MILLIGRAM(S): 5 TABLET ORAL at 18:21

## 2023-11-16 RX ADMIN — CARVEDILOL PHOSPHATE 6.25 MILLIGRAM(S): 80 CAPSULE, EXTENDED RELEASE ORAL at 05:04

## 2023-11-16 RX ADMIN — Medication 2: at 12:20

## 2023-11-16 RX ADMIN — Medication 100 MILLIGRAM(S): at 05:02

## 2023-11-16 RX ADMIN — INSULIN GLARGINE 8 UNIT(S): 100 INJECTION, SOLUTION SUBCUTANEOUS at 21:37

## 2023-11-16 RX ADMIN — Medication 8 UNIT(S): at 12:20

## 2023-11-16 RX ADMIN — AMIODARONE HYDROCHLORIDE 400 MILLIGRAM(S): 400 TABLET ORAL at 21:36

## 2023-11-16 RX ADMIN — ISOSORBIDE DINITRATE 40 MILLIGRAM(S): 5 TABLET ORAL at 05:02

## 2023-11-16 RX ADMIN — AMIODARONE HYDROCHLORIDE 400 MILLIGRAM(S): 400 TABLET ORAL at 15:05

## 2023-11-16 RX ADMIN — CHLORHEXIDINE GLUCONATE 1 APPLICATION(S): 213 SOLUTION TOPICAL at 21:45

## 2023-11-16 RX ADMIN — ATORVASTATIN CALCIUM 80 MILLIGRAM(S): 80 TABLET, FILM COATED ORAL at 21:36

## 2023-11-16 RX ADMIN — Medication 25 GRAM(S): at 05:02

## 2023-11-16 RX ADMIN — Medication 100 MILLIGRAM(S): at 15:05

## 2023-11-16 RX ADMIN — SPIRONOLACTONE 25 MILLIGRAM(S): 25 TABLET, FILM COATED ORAL at 05:02

## 2023-11-16 RX ADMIN — BUDESONIDE AND FORMOTEROL FUMARATE DIHYDRATE 2 PUFF(S): 160; 4.5 AEROSOL RESPIRATORY (INHALATION) at 20:19

## 2023-11-16 RX ADMIN — BUDESONIDE AND FORMOTEROL FUMARATE DIHYDRATE 2 PUFF(S): 160; 4.5 AEROSOL RESPIRATORY (INHALATION) at 12:43

## 2023-11-16 RX ADMIN — Medication 10 MILLIGRAM(S): at 21:37

## 2023-11-16 RX ADMIN — Medication 0.5 MILLIGRAM(S): at 12:18

## 2023-11-16 RX ADMIN — AMIODARONE HYDROCHLORIDE 400 MILLIGRAM(S): 400 TABLET ORAL at 05:03

## 2023-11-16 RX ADMIN — CARVEDILOL PHOSPHATE 12.5 MILLIGRAM(S): 80 CAPSULE, EXTENDED RELEASE ORAL at 18:20

## 2023-11-16 RX ADMIN — Medication 81 MILLIGRAM(S): at 12:12

## 2023-11-16 RX ADMIN — Medication 4000 MILLILITER(S): at 21:45

## 2023-11-16 RX ADMIN — ISOSORBIDE DINITRATE 40 MILLIGRAM(S): 5 TABLET ORAL at 12:12

## 2023-11-16 RX ADMIN — Medication 100 MILLIGRAM(S): at 21:36

## 2023-11-16 NOTE — PROGRESS NOTE ADULT - PROBLEM SELECTOR PLAN 2
- PMVT into VF arrest 11/8, 3 rounds with shocks  - Transitioned from IV Lidocaine infusion to PO Amio load 11/14  - IABP support as above  - EP following

## 2023-11-16 NOTE — PROGRESS NOTE ADULT - ASSESSMENT
59 yo M with HFrEF (LVIDd 6.4 cm, LVEF 32%), CAD s/p PCI (2008), HTN, DMT2 (A1c 8.3%) and CVA s/p TPA (2018), recently treated for PNA who initially presented to [facility] via EMS after syncope reportedly requiring defibrilation. Treated for ACS but left AMA to come to [facility]. On arrival ECG with ST depression in lateral leads. Intubated 11/1 for respiratory failure. Found to have COVID. L/RHC 11/1revealing  of LAD and RCA, elevated filling pressures and CI of 1.5 prompting placement of IABP. Extubated 11/3. IABP was weaned to off 11/7, then the following day on 11/8, developed PMVT cardiac arrest with prompt CPR and defibrillation, started on IV Lidocaine and Amio. IABP ultimately replaced on 11/9 for worsening hypotension. TTE 11/11 revealing LV thrombus.     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 AT evaluation launched 11/10, he is ABO A. Currently well supported on IABP at 1:1 without further arrhythmias.      Cardiac Studies  ·	11/1123 TTE: LVEF 22% (global), LV thrombus, normal RV size and function, mild MR, trace TR  ·	11/1/23  LHC showed pLAD 70 % stenosis in the ostium portion of the segment and 100 % in-stent restenosis and in mRCA, 100 % stenosis.   ·	11/1/23 RHC; RA 23 Vw 24, PA 52/33/40, PCWP 30 Vw 33, AO 85/66/73, PA sat 54.4%, CO/CI (F) 2.96/1.44, IABP was placed during the cath through R common femoral artery.   ·	11/1/23 TTE: LVIDd 6.4cm , LVEF 32%, regional WMA, grade II DD,  TAPSE 1.7cm, mld MR, trace TR,     Bedside hemodynamics  11/3: IABP 1:1 RA 5; PA 27/12/18; CO/CI 5.6/2.6; MAP 90-100s.  11/4/23 IABP 1:3 CVP 4 PA 37/18 SvO2 83.8 CO/CI 11.2 CI 5.1  (in the setting of fever to 38.3C)  11/5 IABP 1:1 CVP 3 PA 48/27 SvO2 78.4% CO 8.2 CI 3.7   11/1 (IABP 1:1): CVP 1, PA 33/5/16, MvO2 74.3%

## 2023-11-16 NOTE — PROGRESS NOTE ADULT - THIS PATIENT HAS THE FOLLOWING CONDITION(S)/DIAGNOSES ON THIS ADMISSION:
Shock/Heart Failure Metronidazole Pregnancy And Lactation Text: This medication is Pregnancy Category B and considered safe during pregnancy.  It is also excreted in breast milk.

## 2023-11-16 NOTE — PROGRESS NOTE ADULT - ASSESSMENT
Impression:     #Cardiogenic shock c/w Vfib arrest s/p amiodarone and IABP  Currently being evaluated for possible heart transplant, patient is high risk for anesthesia for a colonoscopy. He has no red flags for colon cancer, has regular non bloody bowel movements w/ no weight loss. Prior colonoscopy 2017 with two tubular adenomas, denies fhx of colon cancer, but due for colon cancer surveillance. Discussed risks vs benefits of the procedure in setting of his cardiac condition. Patient expressed understanding and is open to proceeding with colonoscopy Friday as he is unable to obtain CT colon due to IABP.    Recommendations:    - Plan for bedside colonoscopy, time to be determined  - Will need cardiac anesthesia Friday for the procedure  - CLD today  - NPO after midnight  - Prep ordered to begin at 1700  - 3 AM CBC, CMP, coags; correct electrolytes via IV  - Transfuse if Hgb < 8  - Ensure adequate hydration   - Rest of care per CCU    Preliminary note until signed by Attending.    Thank you for involving us in this patient's care. Please reach out if any further questions or concerns.    Nuha Sánchez MD  Gastroenterology/Hepatology Fellow, PGY-7    NON-URGENT CONSULTS:  Please email [email or web address]  [email or web address]  AT NIGHT AND ON WEEKENDS:  Contact on-call GI fellow via answering service ([phone]) from 5pm-8am and on weekends/holidays  MONDAY-FRIDAY 8AM-5PM:  Pager: [phone] ([facility])  Pager: [phone] ([facility])   Impression:     #Cardiogenic shock c/w Vfib arrest s/p amiodarone and IABP  Currently being evaluated for possible heart transplant, patient is high risk for anesthesia for a colonoscopy. He has no red flags for colon cancer, has regular non bloody bowel movements w/ no weight loss. Prior colonoscopy 2017 with two tubular adenomas, denies fhx of colon cancer, but due for colon cancer surveillance. Discussed risks vs benefits of the procedure in setting of his cardiac condition. Patient expressed understanding and is open to proceeding with colonoscopy Friday as he is unable to obtain CT colon due to IABP.    Recommendations:    - Plan for bedside colonoscopy, time to be determined  - Will need cardiac anesthesia Friday for the procedure  - CLD today  - NPO after midnight  - Prep ordered to begin at 1700  - If able to hold A/C, hold for 6 hours before exam. Otherwise if too high risk to interrupt A/C, will plan to perform colonoscopy on anticoagulation for diagnostic exam  - 3 AM CBC, CMP, coags; correct electrolytes via IV  - Transfuse if Hgb < 8  - Ensure adequate hydration   - Rest of care per CCU    Preliminary note until signed by Attending.    Thank you for involving us in this patient's care. Please reach out if any further questions or concerns.    Nuha Sánchez MD  Gastroenterology/Hepatology Fellow, PGY-7    NON-URGENT CONSULTS:  Please email [email or web address]  [email or web address]  AT NIGHT AND ON WEEKENDS:  Contact on-call GI fellow via answering service ([phone]) from 5pm-8am and on weekends/holidays  MONDAY-FRIDAY 8AM-5PM:  Pager: [phone] ([facility])  Pager: [phone] ([facility])   Mirvaso Counseling: Mirvaso is a topical medication which can decrease superficial blood flow where applied. Side effects are uncommon and include stinging, redness and allergic reactions.

## 2023-11-16 NOTE — PROGRESS NOTE ADULT - CRITICAL CARE ATTENDING COMMENT
History of anxiety disorder and CAD s/p PCI  Admitted with cardiogenic shock and respiratory failure in the setting of stent restenosis  Transferred out and had VT/VF cardiac arrest  Intubated during arrest, ROSC achieved, readmitted to CICU  A+Ox3, anxious - continue Ativan 0.5 q6h  Cardiogenic shock on IABP, off milrinone   Maintain IABP and afterload reduction  as d/w CHf team cont coreg   VT/VF arrest, on Amiodarone PO and Lidocaine infusion, wean lido as tolerated     Continue Amio  Continue with ASA  SpO2 mid 90s on 2L NC wean as tolerated   Soft diet  Non-oliguric ARIC that is improving, likely ATN from arrest  H/H low but acceptable on Heparin drip for LV thrombus  Afebrile, cultures no growth, no antibiotics   Sugars controlled on Lantus  LFA IABP, rangel 11/9.  Long discussion with him about colonoscopy as he does not understand why he needs it and attributes is to the fact that "someone documented that he has fam hx of colon CA, which is not true".  I told the pt that decision of colonoscopy is made by the transplant team and is quite common in the pt such as himself.  He wished for me to say that absolutely nothing will happen during the colonoscopy and was upset why I said I cannot give that kind of promise, nor would I given that kind of promise to anyone.  I explained to him that obviously given his condition procedure carries a risk and we cannot promise that nothing will happen, but we can certainly try to take all the precautions but nothing is ever a 100 percent.  Additionally he voiced his concerned that he does not like when he is being told what to do and he feels that being told about having a colonoscopy he is being told what to do.  I told him that he should definitely discuss and voice his concerns with CHF team, he immediately said that he "will do whatever is necessarily, but he does not understand why he must do it".  Again I explained to him the process and told him that I can ask CHF team to speak to him as well, which he declined.

## 2023-11-16 NOTE — PROGRESS NOTE ADULT - SUBJECTIVE AND OBJECTIVE BOX
Behavioral Cardiology Psychological Assessment    History of Present Illness: Mr. Hardy is a 58 year-old man with history of HTN, CAD (1 stent in 2009), ICH (2008) presented on 11/1 with abnormal EKG. Patient presented to [facility] where he was found to have STEMI, recommended to get cath however patient did not want treatment at OSH so left and came to [facility]. EKG here with ST depression in lateral leads and elevation in anterior leads. Prior to C found to be tachycardic, dyspneic, intubated. LHC 11/1 with chronic total occlusion of LAD and RCA, with elevated RA and PA pressures. TTE 11/1 with severely decreased EF 32%, s/p IABP 11/1.     Social history: Mr. Martin Hardy is a 58-year-old [place] American,  male with three children, including two sons and three daughters. 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 His wife, Brynn, works as an  in a public high school in [facility]. Mr. Hardy reported three close friendships with men who live in [place]. Notably, one of these men, Dr. Brody Le, is Mr. Hardy’s healthcare proxy and PCP. Mr. Hardy also reported that these friends feel like family due to their closeness. Currently, Mr. Hardy lives in a condominium apartment in [facility] with his wife and children. 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 Mr. Hardy noted his parents live in [place] as does one brother and one sister. Mr. Hardy has another sister, diagnosed with bipolar disorder, who lives in [place]. He described feeling distant from his siblings, as they lack a close relationship.      Substance use:    •	Tobacco: Denies current and past tobacco use.    •	Alcohol: Denies current and past alcohol use.   •	Drug: Denies current and past drug use.       Past psychiatric history: Mr. Hardy denied any history of self-harm and suicidal ideation, plan, or attempt. He also denied any history of violent thoughts or behaviors. Denies history of outpatient treatment with a therapist or psychiatrist. With this said, he reported a history of odd thinking related, health-related anxiety. For example, Mr. Hardy reported meeting with a physician about 12 years ago because he feared having a brain tumor. He believed that the presence of a brain tumor could explain his superior memory. Earlier this year, he revealed anxiety to his PCP, who prescribed Mr. Hardy Lorazepam (0.5mg PRN). Per medical chart, Mr. Hardy filled his prescription three separate times in the past year. He reported concern about dependency, given Lorazepam’s status as a controlled medication, and desire to avoid substance abuse.      Psychological assessment: Endorsed anxiety in setting of health related worries including scheduled colonoscopy tomorrow. Requesting statistics on success rates. Discussed strategies for managing anxiety and worrisome thoughts including reframing, challenging automatic negative thoughts, and relaxation exercises. Endorsed increased anxiety and has been taking ativan prn. In the past has used deep breathing exercises to manage his anxiety successfully. Denies s/i. Denies panic attacks.        Risk assessment: low acute risk    Risk factors: chronic health issues, current anxiety   Protective factors: future oriented thinking, social support, no history of substance use, responsibility to family.      Mental Status Exam: Seen sitting in bed on CICU. Awake, alert. Oriented x4. Well related. Maintained good eye contact. Speech was normal volume, rate, tone. Mood nervous. Affect anxious. Thought process goal directed with some tangentiality but easily redirected, content focused on worry about upcoming procedure. Denies s/i. Insight into disease adequate. Immediate judgement good.       Cognitive screen: Performed below normal range (24/30), suggesting mild cognitive impairment with possible difficulties in verbal fluency, encoding verbal information, and abstract thinking.       Dx: Adjustment Disorder with anxiety     SIPAT score = 13. No absolute psychological contraindications for pursuing heart transplant/LVAD with following recommendations:     ·	Psychology will continue to follow for supportive therapy and CBT strategies to manage anxiety.  ·	Seen by psychiatry (11/8), will benefit from follow up.   ·	Maintain ongoing psychiatric follow-up.   ·	Holistic Nurse.   ·	Pet therapy if appropriate.   ·	LVAD and heart transplant support group information provided.       30 minutes spent on patient encounter

## 2023-11-16 NOTE — PROGRESS NOTE ADULT - ASSESSMENT
====================ASSESSMENT =============  57 yo male with HTN, CAD (s/p PCI 2008), HFrEF, CVA 2018, and T2DM presenting with chest pressure and unknown tachycardia that was shocked x1, [facility] 11/1 found to have in-stent restenosis of pLAD and  of RCA with elevated RA and PA pressures and severely decreased EF 32%, admitted to CICU for management of cardiogenic shock requiring IABP 11/1 -11/7, with hospital course c/b vfib arrest requiring reinsertion of IABP.     Plan:  ====================== NEUROLOGY=====================  # Anxiety  - Ativan 0.5 mg PO Q 6hrs PRN   - No Seroquel or antipsychotics since vfib arrest and prolonged QTC  - Psych Eval last week, recs for continuing Ativan PRN and had recommended SSRI, but pt. refused     # Pain 2/2 rib fractures post CPR  - c/w multimodal pain control w/ tylenol and lidoderm TD    ==================== RESPIRATORY======================  # Acute Hypoxemic Respiratory Failure  - s/p 2 intubations for cardiogenic pulm edema and the in setting of cardiac arrest  - Extubated 11/10  - currently on room air with spO2 mid 90s    # COVID +  - off isolation: cleared by ID 11/11     # Asthma (hx of respiratory failure in 2018- intubated for 20 minutes per chart review pt report)  - c/w albuterol, symbicort and spiriva  - on trelegy at home    ====================CARDIOVASCULAR==================  # Vfib arrest insetting of ischemia vs. shock  - Lido off since 11/13   - PO Amio load dose (s/p IV amio ~1950mg IV gtt) for total of 5 grams per EP to complete 11/17  - Keep K > 4, Mag > 2.2     # Cardiogenic shock requiring IABP (11/1- 11/7, 11/9-)  - likely 2/2 NSTEMI and ADHF  - 11/1 LHC: pLAD 100 % in-stent restenosis & mRCA, 100 %. PCWP 30. IABP placed.  - 11/1 TTE: LV dilated. EF 32 %. Regional WMAs present, mod (grade 2) LV diastolic dysfunction  - 11/2 TTE: EF 22% and + LV thrombus   - IABP removed 11/7, vfib 11/9, IABP later reinserted that day for CS  - D/C Milrinone gtt 7:30 am 11/11  - Currently on hydralazine 100 TID and ISD 40 TID for AL reduction  - Holding diuretics at this time, PRN Bumex IVP as needed  - Continue romel 25 daily for GDMT  - AT Eval, F/u HF recs    # NSTEMI- Stent re-occlusion of pLAD and 100%  of RCA  - EKG on admission w/LBBB  - DAPT: c/w ASA, Brilinta d/c'd per transplant w/u  - Cont. lipitor 80  - cMR deferred given necessity of IABP  - CT sx not recommending CABG, undergoing AT eval    # LV thrombus  - c/w heparin gtt    ===================HEMATOLOGIC/ONC ===================  - H/H and platelets stable    # VTE prophylaxis   - c/w heparin gtt given LV Thrombus and IABP     ===================== RENAL =========================  # Non-oliguric ARIC   - sCr now improving. Baseline Cr: 1-1.22  - Diuresis held, optimize pre-renal factors for optimal perfusion and volume status  - Trend BMP, lytes daily, replace as needed  - Continue Strict I/Os, avoid nephrotoxins    ==================== GASTROINTESTINAL===================  -Tolerating DASH diet    GERD  - c/w home protonix    Bowel regimen  - Miralax and senna d/c'd iso loose stools- trend quantity and quality of BMs    =======================    ENDOCRINE  =====================  Type 2 DM  - A1c 8.3, sugars uncontrolled on admission likely stress induced s/p cardiac arrest  - insulin gtt transitioned off 11/11 AM  - lantus 8, premeal 8, low ISS    ========================INFECTIOUS DISEASE================  - Afebrile, continue to monitor off abx  - Concern for aspiration event prior to intubation on admission - s/p vanco and cefepime  - BCx 11/10 NGTD, RVP neg, MRSA neg, UA neg  - Mild fluctuations in WBCs w/o fever and no left shift    COVID  - Off airborne precautions 11/11    Pre-transplant ID w/u   - trend ID recs for serologies     LINES:   RIJ Armando 11/9-11/14, RAx Jacklyn 11/9, LFA IABP 11/9 ====================ASSESSMENT =============  59 yo male with HTN, CAD (s/p PCI 2008), HFrEF, CVA 2018, and T2DM presenting with chest pressure and unknown tachycardia that was shocked x1, [facility] 11/1 found to have in-stent restenosis of pLAD and  of RCA with elevated RA and PA pressures and severely decreased EF 32%, admitted to CICU for management of cardiogenic shock requiring IABP 11/1 -11/7, with hospital course c/b vfib arrest requiring reinsertion of IABP.     Plan:  ====================== NEUROLOGY=====================  # Anxiety  - Ativan 0.5 mg PO Q 6hrs PRN   - No Seroquel or antipsychotics since vfib arrest and prolonged QTC  - Psych Eval last week, recs for continuing Ativan PRN and had recommended SSRI, but pt. refused     # Pain 2/2 rib fractures post CPR  - c/w multimodal pain control w/ tylenol and lidoderm TD    ==================== RESPIRATORY======================  # Acute Hypoxemic Respiratory Failure  - s/p 2 intubations for cardiogenic pulm edema and the in setting of cardiac arrest  - Extubated 11/10  - currently on room air with spO2 mid 90s    # COVID +  - off isolation: cleared by ID 11/11     # Asthma (hx of respiratory failure in 2018- intubated for 20 minutes per chart review pt report)  - c/w albuterol, symbicort and spiriva  - on trelegy at home    ====================CARDIOVASCULAR==================  # Vfib arrest insetting of ischemia vs. shock  - Lido off since 11/13   - PO Amio load dose (s/p IV amio ~1950mg IV gtt) for total of 5 grams per EP to complete 11/17  - Keep K > 4, Mag > 2.2     # Cardiogenic shock requiring IABP (11/1- 11/7, 11/9-)  - likely 2/2 NSTEMI and ADHF  - 11/1 LHC: pLAD 100 % in-stent restenosis & mRCA, 100 %. PCWP 30. IABP placed.  - 11/1 TTE: LV dilated. EF 32 %. Regional WMAs present, mod (grade 2) LV diastolic dysfunction  - 11/2 TTE: EF 22% and + LV thrombus   - IABP removed 11/7, vfib 11/9, IABP later reinserted that day for CS  - D/C Milrinone gtt 7:30 am 11/11  - Currently on hydralazine 100 TID and ISD 40 TID for AL reduction\  - continue coreg, uptitrate as tolerated  - Holding diuretics at this time, PRN Bumex IVP as needed  - Continue romel 25 daily for GDMT  - AT Eval, F/u HF recs    # NSTEMI- Stent re-occlusion of pLAD and 100%  of RCA  - EKG on admission w/LBBB  - DAPT: c/w ASA, Brilinta d/c'd per transplant w/u  - Cont. lipitor 80  - cMR deferred given necessity of IABP  - CT sx not recommending CABG, undergoing AT eval    # LV thrombus  - c/w heparin gtt    ===================HEMATOLOGIC/ONC ===================  - H/H and platelets stable    # VTE prophylaxis   - c/w heparin gtt given LV Thrombus and IABP     ===================== RENAL =========================  # Non-oliguric ARIC   - sCr now improving. Baseline Cr: 1-1.22  - Diuresis held, optimize pre-renal factors for optimal perfusion and volume status  - Trend BMP, lytes daily, replace as needed  - Continue Strict I/Os, avoid nephrotoxins    ==================== GASTROINTESTINAL===================  -Tolerating DASH diet    GERD  - c/w home protonix    Bowel regimen  - Miralax and senna d/c'd iso loose stools- trend quantity and quality of BMs    =======================    ENDOCRINE  =====================  Type 2 DM  - A1c 8.3, sugars uncontrolled on admission likely stress induced s/p cardiac arrest  - insulin gtt transitioned off 11/11 AM  - lantus 8, premeal 8, low ISS    ========================INFECTIOUS DISEASE================  - Afebrile, continue to monitor off abx  - Concern for aspiration event prior to intubation on admission - s/p vanco and cefepime  - BCx 11/10 NGTD, RVP neg, MRSA neg, UA neg  - Mild fluctuations in WBCs w/o fever and no left shift    COVID  - Off airborne precautions 11/11    Pre-transplant ID w/u   - trend ID recs for serologies     LINES:   RICANDY Roberts 11/9-11/14, RAx Jacklyn 11/9, LFA IABP 11/9

## 2023-11-16 NOTE — PROGRESS NOTE ADULT - ASSESSMENT
====================ASSESSMENT =============  59 yo male with HTN, CAD (s/p PCI 2008), HFrEF, CVA 2018, and T2DM presenting with chest pressure and unknown tachycardia that was shocked x1, [facility] 11/1 found to have in-stent restenosis of pLAD and  of RCA with elevated RA and PA pressures and severely decreased EF 32%, admitted to CICU for management of cardiogenic shock requiring IABP 11/1 -11/7, with hospital course c/b vfib arrest requiring reinsertion of IABP.     Plan:  ====================== NEUROLOGY=====================  # Anxiety  - Ativan 0.5 mg PO Q 6hrs PRN   - No Seroquel or antipsychotics since vfib arrest and prolonged QTC  - Psych Eval last week, recs for continuing Ativan PRN and had recommended SSRI, but pt. refused     # Pain 2/2 rib fractures post CPR  - c/w multimodal pain control w/ tylenol and lidoderm TD    ==================== RESPIRATORY======================  # Acute Hypoxemic Respiratory Failure  - s/p 2 intubations for cardiogenic pulm edema and the in setting of cardiac arrest  - Extubated 11/10  - currently on room air with spO2 mid 90s    # COVID +  - off isolation: cleared by ID 11/11     # Asthma (hx of respiratory failure in 2018- intubated for 20 minutes per chart review pt report)  - c/w albuterol, symbicort and spiriva  - on trelegy at home    ====================CARDIOVASCULAR==================  # Vfib arrest insetting of ischemia vs. shock  - Lido off since 11/13   - PO Amio load dose (s/p IV amio ~1950mg IV gtt) for total of 5 grams per EP to complete 11/17  - Keep K > 4, Mag > 2.2     # Cardiogenic shock requiring IABP (11/1- 11/7, 11/9-)  - likely 2/2 NSTEMI and ADHF  - 11/1 LHC: pLAD 100 % in-stent restenosis & mRCA, 100 %. PCWP 30. IABP placed.  - 11/1 TTE: LV dilated. EF 32 %. Regional WMAs present, mod (grade 2) LV diastolic dysfunction  - 11/2 TTE: EF 22% and + LV thrombus   - IABP removed 11/7, vfib 11/9, IABP later reinserted that day for CS  - D/C Milrinone gtt 7:30 am 11/11  - Currently on hydralazine 100 TID and ISD 40 TID for AL reduction\  - continue coreg 12.5, uptitrate as tolerated  - Holding diuretics at this time, PRN Bumex IVP as needed  - Continue romel 25 daily for GDMT  - AT Eval, F/u HF recs    # NSTEMI- Stent re-occlusion of pLAD and 100%  of RCA  - EKG on admission w/LBBB  - DAPT: c/w ASA, Brilinta d/c'd per transplant w/u  - Cont. lipitor 80  - cMR deferred given necessity of IABP  - CT sx not recommending CABG, undergoing AT eval    # LV thrombus  - c/w heparin gtt    ===================HEMATOLOGIC/ONC ===================  - H/H and platelets stable    # VTE prophylaxis   - c/w heparin gtt given LV Thrombus and IABP     ===================== RENAL =========================  # Non-oliguric ARIC   - sCr now improving. Baseline Cr: 1-1.22  - Diuresis held, optimize pre-renal factors for optimal perfusion and volume status  - Trend BMP, lytes daily, replace as needed  - Continue Strict I/Os, avoid nephrotoxins    ==================== GASTROINTESTINAL===================  #NPO for colonoscopy  - Bowel prep tonight    GERD  - c/w home protonix    Bowel regimen  - Miralax and senna d/c'd iso loose stools- trend quantity and quality of BMs    =======================    ENDOCRINE  =====================  Type 2 DM  - A1c 8.3, sugars uncontrolled on admission likely stress induced s/p cardiac arrest  - insulin gtt transitioned off 11/11 AM  - lantus 8, premeal 8, low ISS    ========================INFECTIOUS DISEASE================  - Afebrile, continue to monitor off abx  - Concern for aspiration event prior to intubation on admission - s/p vanco and cefepime  - BCx 11/10 NGTD, RVP neg, MRSA neg, UA neg  - Mild fluctuations in WBCs w/o fever and no left shift    COVID  - Off airborne precautions 11/11    Pre-transplant ID w/u   - trend ID recs for serologies     LINES:   RIJ [place] 11/9-11/14, RAx Jacklyn 11/9, LFA IABP 11/9    ======================= DISPOSITION  =====================  [X] Critical   [ ] Guarded    [ ] Stable    [X] Maintain in CICU  [ ] Downgrade to Telemtry  [ ] Discharge Home    Patient requires continuous monitoring with bedside rhythm monitoring, pulse ox monitoring, and intermittent blood gas analysis. Care plan discussed with ICU care team. Patient remained critical and at risk for life threatening decompensation.  Patient seen, examined and plan discussed with CCU team during rounds.     I have personally provided 35 minutes of critical care time excluding time spent on separate procedures, in addition to initial critical care time provided by the CICU Attending, Dr. Durand

## 2023-11-16 NOTE — PROGRESS NOTE ADULT - SUBJECTIVE AND OBJECTIVE BOX
DEVORAH VALENCIA  MRN-[id number]  Patient is a 58y old  Male who presents with a chief complaint of CHF (2023 06:55)    HPI:  pt seen and approx 1:30 pm  in [facility]    57yo M w/ hx HTN, CAD w/ 1 stent in , ICH () presenting with abn ekg. Patient presented to [facility] where he was found to have STEMI, recommended to get cath however patient did not want to get it there so it left and came here.  Patient initially had cough, congestion, fever, was placed on antibiotics on .  Started feeling nauseous and had a presyncopal event after which he presented to ED last night.  Had chest pain as well.  Chest pain is midsternal.  Not currently having chest pain.  Received 4 aspirin 30 min pta. (2023 15:11)      24 HOUR EVENTS:    REVIEW OF SYSTEMS:    CONSTITUTIONAL: No weakness, fevers or chills  EYES/ENT: No visual changes;  No vertigo or throat pain   NECK: No pain or stiffness  RESPIRATORY: No cough, wheezing, hemoptysis; No shortness of breath  CARDIOVASCULAR: No chest pain or palpitations  GASTROINTESTINAL: No abdominal or epigastric pain. No nausea, vomiting, or hematemesis; No diarrhea or constipation. No melena or hematochezia.  GENITOURINARY: No dysuria, frequency or hematuria  NEUROLOGICAL: No numbness or weakness  SKIN: No itching, rashes      ICU Vital Signs Last 24 Hrs  T(C): 36.8 (2023 16:00), Max: 36.8 (15 Nov 2023 23:00)  T(F): 98.3 (2023 16:00), Max: 98.3 (2023 16:00)  HR: 89 (2023 19:00) (75 - 89)  BP: --  BP(mean): --  ABP: 111/58 (2023 19:00) (99/56 - 141/73)  ABP(mean): 87 (2023 19:00) (81 - 113)  RR: 22 (2023 19:00) (18 - 28)  SpO2: 94% (2023 19:00) (93% - 97%)    O2 Parameters below as of 2023 19:00  Patient On (Oxygen Delivery Method): room air            CVP(mm Hg): --  CO: --  CI: --  PA: --  PA(mean): --  PA(direct): --  PCWP: --  LA: --  RA: --  SVR: --  SVRI: --  PVR: --  PVRI: --  I&O's Summary    15 Nov 2023 07:  -  2023 07:00  --------------------------------------------------------  IN: 1158 mL / OUT: 5 mL / NET: -867 mL    2023 07:01  -  2023 20:08  --------------------------------------------------------  IN: 612 mL / OUT: 550 mL / NET: 62 mL        CAPILLARY BLOOD GLUCOSE    CAPILLARY BLOOD GLUCOSE      POCT Blood Glucose.: 139 mg/dL (2023 16:53)      PHYSICAL EXAM:  GENERAL: No acute distress, well-developed  HEAD:  Atraumatic, Normocephalic  EYES: EOMI, PERRLA, conjunctiva and sclera clear  NECK: Supple, no lymphadenopathy, no JVD  CHEST/LUNG: CTAB; No wheezes, rales, or rhonchi  HEART: Regular rate and rhythm. Normal S1/S2. No murmurs, rubs, or gallops  ABDOMEN: Soft, non-tender, non-distended; normal bowel sounds, no organomegaly  EXTREMITIES:  2+ peripheral pulses b/l, No clubbing, cyanosis, or edema  NEUROLOGY: A&O x 3, no focal deficits  SKIN: No rashes or lesions    ============================I/O===========================   I&O's Detail    15 Nov 2023 07:  -  2023 07:00  --------------------------------------------------------  IN:    Heparin: 288 mL    Oral Fluid: 870 mL  Total IN: 1158 mL    OUT:    Voided (mL):  mL  Total OUT: 5 mL    Total NET: -867 mL      2023 07:01  -  2023 20:08  --------------------------------------------------------  IN:    Heparin: 132 mL    Oral Fluid: 480 mL  Total IN: 612 mL    OUT:    Voided (mL): 550 mL  Total OUT: 550 mL    Total NET: 62 mL        ============================ LABS =========================                        12.1   11.68 )-----------( 264      ( 2023 00:55 )             36.5     -    135  |  104  |  36<H>  ----------------------------<  134<H>  4.4   |  17<L>  |  1.64<H>    Ca    9.9      2023 00:55  Phos  3.2     -  Mg     1.9     -    TPro  7.7  /  Alb  3.9  /  TBili  0.6  /  DBili  x   /  AST  36  /  ALT  73<H>  /  AlkPhos  64                  LIVER FUNCTIONS - ( 2023 00:55 )  Alb: 3.9 g/dL / Pro: 7.7 g/dL / ALK PHOS: 64 U/L / ALT: 73 U/L / AST: 36 U/L / GGT: x           PT/INR - ( 2023 00:55 )   PT: 13.6 sec;   INR: 1.24 ratio         PTT - ( 2023 00:55 )  PTT:66.9 sec    Lactate, Blood: 0.7 mmol/L (23 @ 00:55)  Blood Gas Arterial, Lactate: 1.1 mmol/L (23 @ 01:55)  Blood Gas Venous - Lactate: 1.3 mmol/L (23 @ 01:55)    Urinalysis Basic - ( 2023 00:55 )    Color: x / Appearance: x / SG: x / pH: x  Gluc: 134 mg/dL / Ketone: x  / Bili: x / Urobili: x   Blood: x / Protein: x / Nitrite: x   Leuk Esterase: x / RBC: x / WBC x   Sq Epi: x / Non Sq Epi: x / Bacteria: x      ======================Micro/Rad/Cardio=================  Telemetry: Reviewed   EKG: Reviewed  CXR: Reviewed  Culture: Reviewed   Echo:   Cath: Cardiac Cath Lab - Adult:   [facility]  Department of Cardiology  [address] [place] [address]  [phone]  Cath Lab Report -- Comprehensive Report  Patient: LD VALENCIA  Study date: 2017  Account number: [id number]  MR number: [id number]  : 1965  Gender: Male  Race: W  Case Physician(s):  Jairon Holguin M.D.  Referring Physician:  Luc Lynn M.D.  INDICATIONS: Unstable angina - CCS4.  HISTORY: The patient has a history of coronary artery disease. The patient  hashypertension and medication-treated dyslipidemia.  PROCEDURE:  --  Left heart catheterization with ventriculography.  --  Left coronary angiography.  --  Right coronary angiography.  TECHNIQUE: The risks and alternatives of the procedures and conscious  sedation were explained to the patient and informed consent was obtained.  Cardiac catheterization performed electively.  Local anesthetic given. Right radial artery access. A 6FR PRELUDE KIT was  inserted in the vessel. Left heart catheterization. Ventriculography was  performed. A 5FR FR4.0 EXPO catheter was utilized. Left coronary artery  angiography. The vessel was injected utilizing a 5FR FL3.5 EXPO catheter.  Right coronary artery angiography. The vessel was injected utilizing a 5FR  FR4.0 EXPO catheter. RADIATION EXPOSURE: 1.1 min.  CONTRAST GIVEN: Omnipaque 55 ml.  MEDICATIONS GIVEN: Midazolam, 1 mg, IV. Fentanyl, 25 mcg, IV. Verapamil  (Isoptin, Calan, Covera), 2.5 mg, IA. Heparin, 3000 units, IA.  VENTRICLES: Global left ventricular function was moderately depressed. EF  estimated was 40 %.  CORONARY VESSELS: The coronary circulation is right dominant.  LM:   --  LM: Normal.  LAD:   --  Proximal LAD: There was a 50 % stenosis.  CX:   --  Circumflex: Normal.  RCA:   --  Mid RCA: There was a 40 % stenosis.  --  Distal RCA: There was a 50 % stenosis.  COMPLICATIONS: There were no complications.  DIAGNOSTIC RECOMMENDATIONS: The patient should continue with the present  medications.  Prepared and signed by  Jairon Holguin M.D.  Signed 2017 12:20:13  HEMODYNAMIC TABLES  Pressures:  Baseline/ Room Air  Pressures:  - HR: 78  Pressures:  - Rhythm:  Pressures:  -- Aortic Pressure (S/D/M): --/--/99  Pressures:  -- Left Ventricle (s/edp): 157/39/--  Outputs:  Baseline/ Room Air  Outputs:  -- CALCULATIONS[age]  Outputs:  -- CALCULATIONS: Body Surface Area: 2.05  Outputs:  -- CALCULATIONS: Height in cm: 175.00  Outputs:  -- CALCULATIONS: Sex: Male  Outputs:  -- CALCULATIONS: Weight in k.40 (17 @ 21:55)            DEVORAH VALENCIA  MRN-[id number]  Patient is a 58y old  Male who presents with a chief complaint of CHF (2023 06:55)    HPI:  pt seen and approx 1:30 pm  in [facility]    57yo M w/ hx HTN, CAD w/ 1 stent in , ICH () presenting with abn ekg. Patient presented to [facility] where he was found to have STEMI, recommended to get cath however patient did not want to get it there so it left and came here.  Patient initially had cough, congestion, fever, was placed on antibiotics on .  Started feeling nauseous and had a presyncopal event after which he presented to ED last night.  Had chest pain as well.  Chest pain is midsternal.  Not currently having chest pain.  Received 4 aspirin 30 min pta. (2023 15:11)      24 HOUR EVENTS:  - NPO for colonoscopy tomorrow  - Per HF, pulmonology, renal, psych, and hepatology consults pending tomorrow    REVIEW OF SYSTEMS:  CONSTITUTIONAL: No weakness, fevers or chills  EYES/ENT: No visual changes;  No vertigo or throat pain   NECK: No pain or stiffness  RESPIRATORY: No cough, wheezing, hemoptysis; No shortness of breath  CARDIOVASCULAR: No chest pain or palpitations  GASTROINTESTINAL: No abdominal or epigastric pain. No nausea, vomiting, or hematemesis; No diarrhea or constipation. No melena or hematochezia.  GENITOURINARY: No dysuria, frequency or hematuria  NEUROLOGICAL: No numbness or weakness  SKIN: No itching, rashes      ICU Vital Signs Last 24 Hrs  T(C): 36.8 (2023 16:00), Max: 36.8 (15 Nov 2023 23:00)  T(F): 98.3 (2023 16:00), Max: 98.3 (2023 16:00)  HR: 89 (2023 19:00) (75 - 89)  BP: --  BP(mean): --  ABP: 111/58 (2023 19:00) (99/56 - 141/73)  ABP(mean): 87 (2023 19:00) (81 - 113)  RR: 22 (2023 19:00) (18 - 28)  SpO2: 94% (2023 19:00) (93% - 97%)    O2 Parameters below as of 2023 19:00  Patient On (Oxygen Delivery Method): room air            CVP(mm Hg): --  CO: --  CI: --  PA: --  PA(mean): --  PA(direct): --  PCWP: --  LA: --  RA: --  SVR: --  SVRI: --  PVR: --  PVRI: --  I&O's Summary    15 Nov 2023 07:01  -  2023 07:00  --------------------------------------------------------  IN: 1158 mL / OUT: 2025 mL / NET: -867 mL    2023 07:01  -  2023 20:08  --------------------------------------------------------  IN: 612 mL / OUT: 550 mL / NET: 62 mL        CAPILLARY BLOOD GLUCOSE    CAPILLARY BLOOD GLUCOSE      POCT Blood Glucose.: 139 mg/dL (2023 16:53)      PHYSICAL EXAM:  GENERAL: No acute distress, well-developed  HEAD:  Atraumatic, Normocephalic  EYES: EOMI, PERRLA, conjunctiva and sclera clear  NECK: Supple, no lymphadenopathy, no JVD  CHEST/LUNG: CTAB; No wheezes, rales, or rhonchi  HEART: Regular rate and rhythm. Normal S1/S2. No murmurs, rubs, or gallops  ABDOMEN: Soft, non-tender, non-distended; normal bowel sounds, no organomegaly  EXTREMITIES:  2+ peripheral pulses b/l, No clubbing, cyanosis, or edema. LF IABP site clean, dry, intact, nontender.   NEUROLOGY: A&O x 3, no focal deficits  SKIN: No rashes or lesions    ============================I/O===========================   I&O's Detail    15 Nov 2023 07:01  -  2023 07:00  --------------------------------------------------------  IN:    Heparin: 288 mL    Oral Fluid: 870 mL  Total IN: 1158 mL    OUT:    Voided (mL): 2025 mL  Total OUT: 5 mL    Total NET: -867 mL      2023 07:01  -  2023 20:08  --------------------------------------------------------  IN:    Heparin: 132 mL    Oral Fluid: 480 mL  Total IN: 612 mL    OUT:    Voided (mL): 550 mL  Total OUT: 550 mL    Total NET: 62 mL        ============================ LABS =========================                        12.1   11.68 )-----------( 264      ( 2023 00:55 )             36.5     -    135  |  104  |  36<H>  ----------------------------<  134<H>  4.4   |  17<L>  |  1.64<H>    Ca    9.9      2023 00:55  Phos  3.2     -  Mg     1.9         TPro  7.7  /  Alb  3.9  /  TBili  0.6  /  DBili  x   /  AST  36  /  ALT  73<H>  /  AlkPhos  64  16                LIVER FUNCTIONS - ( 2023 00:55 )  Alb: 3.9 g/dL / Pro: 7.7 g/dL / ALK PHOS: 64 U/L / ALT: 73 U/L / AST: 36 U/L / GGT: x           PT/INR - ( 2023 00:55 )   PT: 13.6 sec;   INR: 1.24 ratio         PTT - ( 2023 00:55 )  PTT:66.9 sec    Lactate, Blood: 0.7 mmol/L (23 @ 00:55)  Blood Gas Arterial, Lactate: 1.1 mmol/L (23 @ 01:55)  Blood Gas Venous - Lactate: 1.3 mmol/L (23 @ 01:55)    Urinalysis Basic - ( 2023 00:55 )    Color: x / Appearance: x / SG: x / pH: x  Gluc: 134 mg/dL / Ketone: x  / Bili: x / Urobili: x   Blood: x / Protein: x / Nitrite: x   Leuk Esterase: x / RBC: x / WBC x   Sq Epi: x / Non Sq Epi: x / Bacteria: x      ======================Micro/Rad/Cardio=================  Telemetry: Reviewed   EKG: Reviewed  CXR: Reviewed  Culture: Reviewed   Echo:   Cath: Cardiac Cath Lab - Adult:   [facility]  Department of Cardiology  [address] [place] [address]  [phone]  Cath Lab Report -- Comprehensive Report  Patient: LD VALENCIA  Study date: 2017  Account number: [id number]  MR number: [id number]  : 1965  Gender: Male  Race: W  Case Physician(s):  Jairon Holguin M.D.  Referring Physician:  Luc Lynn M.D.  INDICATIONS: Unstable angina - CCS4.  HISTORY: The patient has a history of coronary artery disease. The patient  hashypertension and medication-treated dyslipidemia.  PROCEDURE:  --  Left heart catheterization with ventriculography.  --  Left coronary angiography.  --  Right coronary angiography.  TECHNIQUE: The risks and alternatives of the procedures and conscious  sedation were explained to the patient and informed consent was obtained.  Cardiac catheterization performed electively.  Local anesthetic given. Right radial artery access. A 6FR PRELUDE KIT was  inserted in the vessel. Left heart catheterization. Ventriculography was  performed. A 5FR FR4.0 EXPO catheter was utilized. Left coronary artery  angiography. The vessel was injected utilizing a 5FR FL3.5 EXPO catheter.  Right coronary artery angiography. The vessel was injected utilizing a 5FR  FR4.0 EXPO catheter. RADIATION EXPOSURE: 1.1 min.  CONTRAST GIVEN: Omnipaque 55 ml.  MEDICATIONS GIVEN: Midazolam, 1 mg, IV. Fentanyl, 25 mcg, IV. Verapamil  (Isoptin, Calan, Covera), 2.5 mg, IA. Heparin, 3000 units, IA.  VENTRICLES: Global left ventricular function was moderately depressed. EF  estimated was 40 %.  CORONARY VESSELS: The coronary circulation is right dominant.  LM:   --  LM: Normal.  LAD:   --  Proximal LAD: There was a 50 % stenosis.  CX:   --  Circumflex: Normal.  RCA:   --  Mid RCA: There was a 40 % stenosis.  --  Distal RCA: There was a 50 % stenosis.  COMPLICATIONS: There were no complications.  DIAGNOSTIC RECOMMENDATIONS: The patient should continue with the present  medications.  Prepared and signed by  Jairon Holguin M.D.  Signed 2017 12:20:13  HEMODYNAMIC TABLES  Pressures:  Baseline/ Room Air  Pressures:  - HR: 78  Pressures:  - Rhythm:  Pressures:  -- Aortic Pressure (S/D/M): --/--/99  Pressures:  -- Left Ventricle (s/edp): 157/39/--  Outputs:  Baseline/ Room Air  Outputs:  -- CALCULATIONS[age]  Outputs:  -- CALCULATIONS: Body Surface Area: 2.05  Outputs:  -- CALCULATIONS: Height in cm: 175.00  Outputs:  -- CALCULATIONS: Sex: Male  Outputs:  -- CALCULATIONS: Weight in k.40 (17 @ 21:55)

## 2023-11-16 NOTE — PROGRESS NOTE ADULT - PROBLEM SELECTOR PLAN 1
- L IABP at 1:1, placed 11/9. On AC with Heparin infusion (also for LV thrombus)  - Started on Coreg 6.25 mg BID 11/15 for further afterload reduction, hold for MAP <65  - Continue HDZN 100 mg TID and ISDN 40 mg TID, hold for MAP <65  - Continue Spironolactone 25 mg QD  - PRN diuretics to target net even fluid balance  - AT evaluation: launched 11/10. GI on board, plan for bedside colonoscopy tomorrow 11/17 following prep today  - Please keep primary Dr. Banuelos [phone] in the loop per family request

## 2023-11-16 NOTE — PROGRESS NOTE ADULT - SUBJECTIVE AND OBJECTIVE BOX
Chief Complaint:  Patient is a 58y old  Male who presents with a chief complaint of Cardiogenic shock (15 Nov 2023 19:48)       Interval Events:   Report received from colonoscopy 2017 with 2 tubular adenomatous polyps with plans to repeat within 5 years  Denies any family history of colon cancer    Hospital Medications:  aMIOdarone    Tablet 400 milliGRAM(s) Oral every 8 hours  aMIOdarone    Tablet   Oral   aspirin  chewable 81 milliGRAM(s) Oral daily  atorvastatin 80 milliGRAM(s) Oral at bedtime  budesonide  80 MICROgram(s)/formoterol 4.5 MICROgram(s) Inhaler 2 Puff(s) Inhalation two times a day  carvedilol 6.25 milliGRAM(s) Oral every 12 hours  chlorhexidine 2% Cloths 1 Application(s) Topical daily  dextrose 50% Injectable 25 Gram(s) IV Push once  dextrose 50% Injectable 12.5 Gram(s) IV Push once  heparin  Infusion 1600 Unit(s)/Hr IV Continuous <Continuous>  hydrALAZINE 100 milliGRAM(s) Oral three times a day  insulin glargine Injectable (LANTUS) 8 Unit(s) SubCutaneous at bedtime  insulin lispro (ADMELOG) corrective regimen sliding scale   SubCutaneous three times a day before meals  insulin lispro (ADMELOG) corrective regimen sliding scale   SubCutaneous at bedtime  insulin lispro Injectable (ADMELOG) 8 Unit(s) SubCutaneous three times a day before meals  isosorbide   dinitrate Tablet (ISORDIL) 40 milliGRAM(s) Oral three times a day  lidocaine   4% Patch 1 Patch Transdermal every 24 hours  LORazepam     Tablet 0.5 milliGRAM(s) Oral every 6 hours PRN  sodium chloride 3%  Inhalation 4 milliLiter(s) Inhalation every 12 hours  spironolactone 25 milliGRAM(s) Oral daily      ROS:   Complete and normal except as mentioned above.  PHYSICAL EXAM:   Vital Signs:  Vital Signs Last 24 Hrs  T(C): 36.8 (2023 04:00), Max: 37.1 (15 Nov 2023 15:00)  T(F): 98.2 (2023 04:00), Max: 98.7 (15 Nov 2023 15:00)  HR: 78 (2023 11:00) (75 - 98)  BP: --  BP(mean): --  RR: 20 (2023 11:00) (18 - 28)  SpO2: 95% (2023 11:00) (93% - 97%)    Parameters below as of 2023 11:00  Patient On (Oxygen Delivery Method): room air      Daily     Daily Weight in k.8 (2023 04:00)    GENERAL: no acute distress  NEURO: aox3  HEENT: anicteric sclera, no conjunctival pallor appreciated  CHEST: no respiratory distress, no accessory muscle use  CARDIAC: regular rate, +IABP  ABDOMEN: soft, non-tender, non-distended, no rebound or guarding  EXTREMITIES: warm, well perfused, +edema  SKIN: no lesions noted    LABS:                          12.1   11.68 )-----------( 264      ( 2023 00:55 )             36.5     11-16    135  |  104  |  36<H>  ----------------------------<  134<H>  4.4   |  17<L>  |  1.64<H>    Ca    9.9      2023 00:55  Phos  3.2     11-16  Mg     1.9     11-16    TPro  7.7  /  Alb  3.9  /  TBili  0.6  /  DBili  x   /  AST  36  /  ALT  73<H>  /  AlkPhos  64  11-16    LIVER FUNCTIONS - ( 2023 00:55 )  Alb: 3.9 g/dL / Pro: 7.7 g/dL / ALK PHOS: 64 U/L / ALT: 73 U/L / AST: 36 U/L / GGT: x             Interval Diagnostic Studies:   US ELASTOGRAPHY PARYNCHYMA   ORDERED BY: HOSSEIN CLEMENT     PROCEDURE DATE:  11/15/2023          INTERPRETATION:  CLINICAL INFORMATION: Heart transplant workup. Evaluate   for liver fibrosis.    COMPARISON: None available.    TECHNIQUE:  Sonography of the abdomen.  Shear Wave Elastography was performed on a [employer] E9 system.    ELASTOGRAPHY:  Liver: Within normal limits. Liver measures 14 cm.    Patient position for exam: Supine  Penetration mode used: No    Median stiffness value: 1.6  VIQR = 0.12  IQR/Median: 7.3%    LIVER FIBROSIS STAGING:    Minimal risk of clinically significant fibrosis; METAVIR F0, F1: Median   stiffness value < 1.66 m/s  Moderate risk of clinically significant fibrosis; METAVIR F2: Median  stiffness value 1.66 - 1.77 m/s  High risk of clinically significant fibrosis; METAVIR F3, F4: Median   stiffness value > 1.77 m/s    Above cutoff values based on [employer] E9 Shear Wave Elastography White Paper.    IMPRESSION:  Minimal risk of clinically significant fibrosis.    --- End of Report ---     The patient is a 89y Female complaining of altered mental status.

## 2023-11-16 NOTE — PROGRESS NOTE ADULT - ASSESSMENT
57 yo male h/o htn, cad s/p pci, ICH, here with NSTEMI  s/p intubation and cath. now in CCU    NSTEMI  s/p  cath  cath results noted. multi vessel dz., CTSx f/u.   hep gtt  pt with vtach/fib arrest again on 11/8. s/p ACLS and ROSC.   now extubated  IABP in place  mngt as per CCU  plan for transplant vs LVAD as per HF and transplant team  transplant w/u ongoing. plan for screening colonoscopy as per gi    LV thrombus   hep gtt    acute on chronic systolic heart failure  heart failure team f/u      pna  recently diagnosed outpt  iv abx now stopped  f/u cult   id following     covid  supportive care    h/o ICH  resolved    agitation  psy consult f/u    mngt as per CCU team  d/w CCU attn         Advanced care planning was discussed with patient and family.  Advanced care planning forms were reviewed and discussed as appropriate.  Differential diagnosis and plan of care discussed with patient after the evaluation.   Pain assessed and judicious use of narcotics when appropriate was discussed.  Importance of Fall prevention discussed.  Counseling on Smoking and Alcohol cessation was offered when appropriate.  Counseling on Diet, exercise, and medication compliance was done.       Approx 75 minutes spent.

## 2023-11-16 NOTE — PROGRESS NOTE ADULT - ATTENDING COMMENTS
Agree with above. Records obtained from prior colonoscopy, patient had 2 adenomas in 2017 and repeat was recommend in 5 years. He is unable to come off IABP and needs colon cancer screening prior to cardiac transplant listing. Will plan for colonoscopy tomorrow - keep on clears today and bowel prep today. If unable to come off A/C, then we will perform colonoscopy on anticoagulation for diagnostic exam (i.e. if polyps seen, they will need to be removed afters once off A/C).

## 2023-11-16 NOTE — PROGRESS NOTE ADULT - SUBJECTIVE AND OBJECTIVE BOX
INFECTIOUS DISEASES FOLLOW UP-- Courtney Peters  [phone]    This is a follow up note for this  58yMale with  Non-ST elevation myocardial infarction (NSTEMI)        ROS:  CONSTITUTIONAL:  No fever, good appetite  CARDIOVASCULAR:  No chest pain or palpitations  RESPIRATORY:  No dyspnea  GASTROINTESTINAL:  No nausea, vomiting, diarrhea, or abdominal pain  GENITOURINARY:  No dysuria  NEUROLOGIC:  No headache,     Allergies    penicillins (Unknown)    Intolerances        ANTIBIOTICS/RELEVANT:  antimicrobials    immunologic:    OTHER:  aMIOdarone    Tablet 400 milliGRAM(s) Oral every 8 hours  aMIOdarone    Tablet   Oral   aspirin  chewable 81 milliGRAM(s) Oral daily  atorvastatin 80 milliGRAM(s) Oral at bedtime  budesonide  80 MICROgram(s)/formoterol 4.5 MICROgram(s) Inhaler 2 Puff(s) Inhalation two times a day  carvedilol 12.5 milliGRAM(s) Oral every 12 hours  chlorhexidine 2% Cloths 1 Application(s) Topical daily  dextrose 50% Injectable 25 Gram(s) IV Push once  dextrose 50% Injectable 12.5 Gram(s) IV Push once  heparin  Infusion 1600 Unit(s)/Hr IV Continuous <Continuous>  hydrALAZINE 100 milliGRAM(s) Oral three times a day  insulin glargine Injectable (LANTUS) 8 Unit(s) SubCutaneous at bedtime  insulin lispro (ADMELOG) corrective regimen sliding scale   SubCutaneous three times a day before meals  insulin lispro (ADMELOG) corrective regimen sliding scale   SubCutaneous at bedtime  insulin lispro Injectable (ADMELOG) 8 Unit(s) SubCutaneous three times a day before meals  isosorbide   dinitrate Tablet (ISORDIL) 40 milliGRAM(s) Oral three times a day  lidocaine   4% Patch 1 Patch Transdermal every 24 hours  LORazepam     Tablet 0.5 milliGRAM(s) Oral every 6 hours PRN  spironolactone 25 milliGRAM(s) Oral daily      Objective:  Vital Signs Last 24 Hrs  T(C): 36.8 (16 Nov 2023 16:00), Max: 36.8 (16 Nov 2023 04:00)  T(F): 98.3 (16 Nov 2023 16:00), Max: 98.3 (16 Nov 2023 16:00)  HR: 73 (16 Nov 2023 22:00) (73 - 89)  BP: --  BP(mean): --  RR: 19 (16 Nov 2023 22:00) (18 - 28)  SpO2: 96% (16 Nov 2023 22:00) (93% - 97%)    Parameters below as of 16 Nov 2023 19:00  Patient On (Oxygen Delivery Method): room air        PHYSICAL EXAM:  Constitutional:no acute distress  Eyes:MARVEL, EOMI  Ear/Nose/Throat: no oral lesions, 	  Respiratory: clear BL  Cardiovascular: S1S2  Gastrointestinal:soft, (+) BS, no tenderness  Extremities:no e/e/c  No Lymphadenopathy  IV sites not inflammed.    LABS:                        12.1   11.68 )-----------( 264      ( 16 Nov 2023 00:55 )             36.5     11-16    135  |  104  |  36<H>  ----------------------------<  134<H>  4.4   |  17<L>  |  1.64<H>    Ca    9.9      16 Nov 2023 00:55  Phos  3.2     11-16  Mg     1.9     11-16    TPro  7.7  /  Alb  3.9  /  TBili  0.6  /  DBili  x   /  AST  36  /  ALT  73<H>  /  AlkPhos  64  11-16    PT/INR - ( 16 Nov 2023 00:55 )   PT: 13.6 sec;   INR: 1.24 ratio         PTT - ( 16 Nov 2023 00:55 )  PTT:66.9 sec  Urinalysis Basic - ( 16 Nov 2023 00:55 )    Color: x / Appearance: x / SG: x / pH: x  Gluc: 134 mg/dL / Ketone: x  / Bili: x / Urobili: x   Blood: x / Protein: x / Nitrite: x   Leuk Esterase: x / RBC: x / WBC x   Sq Epi: x / Non Sq Epi: x / Bacteria: x        MICROBIOLOGY:            RECENT CULTURES:  11-10 @ 00:34  .Blood Blood-Peripheral  --  --  --    No growth at 5 days  --      RADIOLOGY & ADDITIONAL STUDIES:    < from: US Elastography Jerson (11.15.23 @ 11:43) >  IMPRESSION:  Minimal risk of clinically significant fibrosis.    < end of copied text >

## 2023-11-16 NOTE — PROGRESS NOTE ADULT - ASSESSMENT
59 yo M with PMHx of HTN, CAD w/ 1 stent in 2009, ICH (2008) presented on 11/1 with abn ekg. Patient presented to [facility] where he was found to have STEMI, recommended to get cath however patient did not want to get it there so he left and came here.   EKG here with ST depression in lateral leads and elevation in anterior leads   Prior to C found to be tachycardic, dyspneic, intubated   C 11/1 with chronic total occlusion of LAD and RCA, with elevated RA and PA pressures  TTE 11/1 with severely decreased EF 32%, s/p IABP 11/1    RVP (11/1) Positive for COVID19  RVP (11/10) Negative  BCx (11/2, 11/4) NGTD  ETT Culture (11/2) NGTD  MRSA/MSSA PCR (11/2, 11/10) Negative  UA (11/10) 1 WBC    CXR (11/10) Clear Lungs  TTE (11/2) Left ventricular thrombus.    Antibiotic Course:  Vancomycin: 11/2  Cefepime: 11/2    #Pre-Heart Transplant Evaluation  --ID Clearance for OHT pending quantiferon gold, syphilis screen and preliminary blood cultures   --Recommend COVID19 Nucleocapsid Antibody  --Recommend COVID19 [place]ano Antibody  --Recommend HAV IgG  --Recommend HBVs Ab, HBVsAg, HBVc Ab  --Recommend HCV Ab  --Recommend HSV 1/2 IgG  --Recommend EBV Serology  --Recommend CMV IgG  --Recommend VZV IgG  --Recommend Measles, Mumps and Rubella IgG serologies  --Recommend Quantiferon Gold  --Recommend HIV Test  --Recommend Syphilis Screen  --Recommend Toxoplasma IgG  --Recommend Strongyloides Ab  --Recommend Trypanosoma cruzii serology    #Fever, Leukocytosis improved  Favor noninfectious etiology of fever such as LV thrombus  Elevated procalcitonin noted but this is in context of ARIC  --Monitor off of antibiotics  --If clinical status changes would start Vancomycin + Cefepime  --Follow up on preliminary blood cultures are negative to date    #COVID19  RVP (11/1) Positive for COVID19  RVP (11/10) Negative  Did not receive therapy for COVID19  Low suspicion that hypoxia currently is secondary to COVID19 (favor cardiogenic causes)  --Complete isolation per infection control protocol        Chao Peters MD  Can be called via Teams  After 5pm/weekends [phone]

## 2023-11-16 NOTE — PROGRESS NOTE ADULT - SUBJECTIVE AND OBJECTIVE BOX
PATIENT:  DEVORAH VALENCIA  [id number]    CHIEF COMPLAINT:  Patient is a 58y old  Male who presents with a chief complaint of Cardiogenic shock (15 Nov 2023 19:48)      INTERVAL HISTORYOVERNIGHT EVENTS:      REVIEW OF SYSTEMS:    Constitutional:     [ ] negative [ ] fevers [ ] chills [ ] weight loss [ ] weight gain  HEENT:                  [ ] negative [ ] dry eyes [ ] eye irritation [ ] postnasal drip [ ] nasal congestion  CV:                         [ ] negative  [ ] chest pain [ ] orthopnea [ ] palpitations [ ] murmur  Resp:                     [ ] negative [ ] cough [ ] shortness of breath [ ] dyspnea [ ] wheezing [ ] sputum [ ] hemoptysis  GI:                          [ ] negative [ ] nausea [ ] vomiting [ ] diarrhea [ ] constipation [ ] abd pain [ ] dysphagia   :                        [ ] negative [ ] dysuria [ ] nocturia [ ] hematuria [ ] increased urinary frequency  Musculoskeletal: [ ] negative [ ] back pain [ ] myalgias [ ] arthralgias [ ] fracture  Skin:                       [ ] negative [ ] rash [ ] itch  Neurological:        [ ] negative [ ] headache [ ] dizziness [ ] syncope [ ] weakness [ ] numbness  Psychiatric:           [ ] negative [ ] anxiety [ ] depression  Endocrine:            [ ] negative [ ] diabetes [ ] thyroid problem  Heme/Lymph:      [ ] negative [ ] anemia [ ] bleeding problem  Allergic/Immune: [ ] negative [ ] itchy eyes [ ] nasal discharge [ ] hives [ ] angioedema    [ ] All other systems negative  [ ] Unable to assess ROS because ________.    MEDICATIONS:  MEDICATIONS  (STANDING):  aMIOdarone    Tablet 400 milliGRAM(s) Oral every 8 hours  aMIOdarone    Tablet   Oral   aspirin  chewable 81 milliGRAM(s) Oral daily  atorvastatin 80 milliGRAM(s) Oral at bedtime  budesonide  80 MICROgram(s)/formoterol 4.5 MICROgram(s) Inhaler 2 Puff(s) Inhalation two times a day  carvedilol 6.25 milliGRAM(s) Oral every 12 hours  chlorhexidine 2% Cloths 1 Application(s) Topical daily  dextrose 50% Injectable 25 Gram(s) IV Push once  dextrose 50% Injectable 12.5 Gram(s) IV Push once  heparin  Infusion 1600 Unit(s)/Hr (12 mL/Hr) IV Continuous <Continuous>  hydrALAZINE 100 milliGRAM(s) Oral three times a day  insulin glargine Injectable (LANTUS) 8 Unit(s) SubCutaneous at bedtime  insulin lispro (ADMELOG) corrective regimen sliding scale   SubCutaneous three times a day before meals  insulin lispro (ADMELOG) corrective regimen sliding scale   SubCutaneous at bedtime  insulin lispro Injectable (ADMELOG) 8 Unit(s) SubCutaneous three times a day before meals  isosorbide   dinitrate Tablet (ISORDIL) 40 milliGRAM(s) Oral three times a day  lidocaine   4% Patch 1 Patch Transdermal every 24 hours  sodium chloride 3%  Inhalation 4 milliLiter(s) Inhalation every 12 hours  spironolactone 25 milliGRAM(s) Oral daily    MEDICATIONS  (PRN):  guaiFENesin Oral Liquid (Sugar-Free) 100 milliGRAM(s) Oral every 6 hours PRN Cough  LORazepam     Tablet 0.5 milliGRAM(s) Oral every 6 hours PRN Anxiety      ALLERGIES:  Allergies    penicillins (Unknown)    Intolerances        OBJECTIVE:  ICU Vital Signs Last 24 Hrs  T(C): 36.8 (2023 04:00), Max: 37.1 (15 Nov 2023 15:00)  T(F): 98.2 (2023 04:00), Max: 98.7 (15 Nov 2023 15:00)  HR: 84 (2023 06:00) (75 - 98)  BP: --  BP(mean): --  ABP: 99/56 (2023 06:00) (99/56 - 141/73)  ABP(mean): 81 (2023 06:00) (81 - 113)  RR: 25 (2023 06:00) (18 - 28)  SpO2: 94% (2023 06:00) (93% - 97%)    O2 Parameters below as of 15 Nov 2023 20:55  Patient On (Oxygen Delivery Method): room air            Adult Advanced Hemodynamics Last 24 Hrs  CVP(mm Hg): --  CVP(cm H2O): --  CO: --  CI: --  PA: --  PA(mean): --  PCWP: --  SVR: --  SVRI: --  PVR: --  PVRI: --  CAPILLARY BLOOD GLUCOSE      POCT Blood Glucose.: 161 mg/dL (15 Nov 2023 21:14)  POCT Blood Glucose.: 165 mg/dL (15 Nov 2023 17:50)  POCT Blood Glucose.: 135 mg/dL (15 Nov 2023 12:42)  POCT Blood Glucose.: 137 mg/dL (15 Nov 2023 08:03)    CAPILLARY BLOOD GLUCOSE      POCT Blood Glucose.: 161 mg/dL (15 Nov 2023 21:14)    I&O's Summary    2023 07:01  -  15 Nov 2023 07:00  --------------------------------------------------------  IN: 1032 mL / OUT: 1640 mL / NET: -608 mL    15 Nov 2023 07:01  -  2023 06:56  --------------------------------------------------------  IN: 1146 mL / OUT: 5 mL / NET: -879 mL      Daily     Daily Weight in k.8 (2023 04:00)    PHYSICAL EXAMINATION:  General: WN/WD NAD  HEENT: PERRLA, EOMI, moist mucous membranes  Neurology: A&Ox3, nonfocal, MOISE x 4  Respiratory: CTA B/L, normal respiratory effort, no wheezes, crackles, rales  CV: RRR, S1S2, no murmurs, rubs or gallops  Abdominal: Soft, NT, ND +BS, Last BM  Extremities: No edema, + peripheral pulses  Incisions:   Tubes:    LABS:                          12.1   11.68 )-----------( 264      ( 2023 00:55 )             36.5     11-16    135  |  104  |  36<H>  ----------------------------<  134<H>  4.4   |  17<L>  |  1.64<H>    Ca    9.9      2023 00:55  Phos  3.2     11-16  Mg     1.9     -16    TPro  7.7  /  Alb  3.9  /  TBili  0.6  /  DBili  x   /  AST  36  /  ALT  73<H>  /  AlkPhos  64  11-16    LIVER FUNCTIONS - ( 2023 00:55 )  Alb: 3.9 g/dL / Pro: 7.7 g/dL / ALK PHOS: 64 U/L / ALT: 73 U/L / AST: 36 U/L / GGT: x           PT/INR - ( 2023 00:55 )   PT: 13.6 sec;   INR: 1.24 ratio         PTT - ( 2023 00:55 )  PTT:66.9 sec        Urinalysis Basic - ( 2023 00:55 )    Color: x / Appearance: x / SG: x / pH: x  Gluc: 134 mg/dL / Ketone: x  / Bili: x / Urobili: x   Blood: x / Protein: x / Nitrite: x   Leuk Esterase: x / RBC: x / WBC x   Sq Epi: x / Non Sq Epi: x / Bacteria: x        TELEMETRY:     EKG:     IMAGING:       PATIENT:  DEVORAH VALENCIA  [id number]    CHIEF COMPLAINT:  Patient is a 58y old  Male who presents with a chief complaint of Cardiogenic shock (15 Nov 2023 19:48)      INTERVAL HISTORY/OVERNIGHT EVENTS: no acute overnight events    REVIEW OF SYSTEMS:    Constitutional:     [x ] negative [ ] fevers [ ] chills [ ] weight loss [ ] weight gain  HEENT:                  [ x] negative [ ] dry eyes [ ] eye irritation [ ] postnasal drip [ ] nasal congestion  CV:                         [ x] negative  [ ] chest pain [ ] orthopnea [ ] palpitations [ ] murmur  Resp:                     [ x] negative [ ] cough [ ] shortness of breath [ ] dyspnea [ ] wheezing [ ] sputum [ ] hemoptysis  GI:                          [x ] negative [ ] nausea [ ] vomiting [ ] diarrhea [ ] constipation [ ] abd pain [ ] dysphagia   :                        [ x] negative [ ] dysuria [ ] nocturia [ ] hematuria [ ] increased urinary frequency  Musculoskeletal: [x ] negative [ ] back pain [ ] myalgias [ ] arthralgias [ ] fracture  Skin:                       [x ] negative [ ] rash [ ] itch  Neurological:        [x ] negative [ ] headache [ ] dizziness [ ] syncope [ ] weakness [ ] numbness    [ ] All other systems negative  [ ] Unable to assess ROS because ________.    MEDICATIONS:  MEDICATIONS  (STANDING):  aMIOdarone    Tablet 400 milliGRAM(s) Oral every 8 hours  aMIOdarone    Tablet   Oral   aspirin  chewable 81 milliGRAM(s) Oral daily  atorvastatin 80 milliGRAM(s) Oral at bedtime  budesonide  80 MICROgram(s)/formoterol 4.5 MICROgram(s) Inhaler 2 Puff(s) Inhalation two times a day  carvedilol 6.25 milliGRAM(s) Oral every 12 hours  chlorhexidine 2% Cloths 1 Application(s) Topical daily  dextrose 50% Injectable 25 Gram(s) IV Push once  dextrose 50% Injectable 12.5 Gram(s) IV Push once  heparin  Infusion 1600 Unit(s)/Hr (12 mL/Hr) IV Continuous <Continuous>  hydrALAZINE 100 milliGRAM(s) Oral three times a day  insulin glargine Injectable (LANTUS) 8 Unit(s) SubCutaneous at bedtime  insulin lispro (ADMELOG) corrective regimen sliding scale   SubCutaneous three times a day before meals  insulin lispro (ADMELOG) corrective regimen sliding scale   SubCutaneous at bedtime  insulin lispro Injectable (ADMELOG) 8 Unit(s) SubCutaneous three times a day before meals  isosorbide   dinitrate Tablet (ISORDIL) 40 milliGRAM(s) Oral three times a day  lidocaine   4% Patch 1 Patch Transdermal every 24 hours  sodium chloride 3%  Inhalation 4 milliLiter(s) Inhalation every 12 hours  spironolactone 25 milliGRAM(s) Oral daily    MEDICATIONS  (PRN):  guaiFENesin Oral Liquid (Sugar-Free) 100 milliGRAM(s) Oral every 6 hours PRN Cough  LORazepam     Tablet 0.5 milliGRAM(s) Oral every 6 hours PRN Anxiety    ALLERGIES:  Allergies    penicillins (Unknown)    Intolerances    OBJECTIVE:  ICU Vital Signs Last 24 Hrs  T(C): 36.8 (2023 04:00), Max: 37.1 (15 Nov 2023 15:00)  T(F): 98.2 (2023 04:00), Max: 98.7 (15 Nov 2023 15:00)  HR: 84 (2023 06:00) (75 - 98)  BP: --  BP(mean): --  ABP: 99/56 (2023 06:00) (99/56 - 141/73)  ABP(mean): 81 (2023 06:00) (81 - 113)  RR: 25 (2023 06:00) (18 - 28)  SpO2: 94% (2023 06:00) (93% - 97%)    O2 Parameters below as of 15 Nov 2023 20:55  Patient On (Oxygen Delivery Method): room air      POCT Blood Glucose.: 161 mg/dL (15 Nov 2023 21:14)  POCT Blood Glucose.: 165 mg/dL (15 Nov 2023 17:50)  POCT Blood Glucose.: 135 mg/dL (15 Nov 2023 12:42)  POCT Blood Glucose.: 137 mg/dL (15 Nov 2023 08:03)    CAPILLARY BLOOD GLUCOSE      POCT Blood Glucose.: 161 mg/dL (15 Nov 2023 21:14)    I&O's Summary    2023 07:01  -  15 Nov 2023 07:00  --------------------------------------------------------  IN: 1032 mL / OUT: 1640 mL / NET: -608 mL    15 Nov 2023 07:01  -  2023 06:56  --------------------------------------------------------  IN: 1146 mL / OUT: 5 mL / NET: -879 mL      Daily     Daily Weight in k.8 (2023 04:00)    PHYSICAL EXAMINATION:  General: WN/WD NAD  HEENT: PERRLA, EOMI, moist mucous membranes  Neurology: A&Ox3, nonfocal, MOISE x 4  Respiratory: CTA B/L, normal respiratory effort, no wheezes, crackles, rales  CV: RRR, S1S2, no murmurs, rubs or gallops  Abdominal: Soft, NT, ND +BS, Last BM  Extremities: No edema, + palpable peripheral pulses  Skin: warm, dry  Lines: L fem IABP dressing clean, dry and intact     LABS:                          12.1   11.68 )-----------( 264      ( 2023 00:55 )             36.5     11-    135  |  104  |  36<H>  ----------------------------<  134<H>  4.4   |  17<L>  |  1.64<H>    Ca    9.9      2023 00:55  Phos  3.2     11-  Mg     1.9     -    TPro  7.7  /  Alb  3.9  /  TBili  0.6  /  DBili  x   /  AST  36  /  ALT  73<H>  /  AlkPhos  64  -16    LIVER FUNCTIONS - ( 2023 00:55 )  Alb: 3.9 g/dL / Pro: 7.7 g/dL / ALK PHOS: 64 U/L / ALT: 73 U/L / AST: 36 U/L / GGT: x           PT/INR - ( 2023 00:55 )   PT: 13.6 sec;   INR: 1.24 ratio         PTT - ( 2023 00:55 )  PTT:66.9 sec    Urinalysis Basic - ( 2023 00:55 )    Color: x / Appearance: x / SG: x / pH: x  Gluc: 134 mg/dL / Ketone: x  / Bili: x / Urobili: x   Blood: x / Protein: x / Nitrite: x   Leuk Esterase: x / RBC: x / WBC x   Sq Epi: x / Non Sq Epi: x / Bacteria: x        TELEMETRY:     EKG:     IMAGING:       PATIENT:  DEVORAH VALENCIA  [id number]    CHIEF COMPLAINT:  Patient is a 58y old  Male who presents with a chief complaint of Cardiogenic shock (15 Nov 2023 19:48)      INTERVAL HISTORY/OVERNIGHT EVENTS: no acute overnight events    REVIEW OF SYSTEMS:    Constitutional:     [x ] negative [ ] fevers [ ] chills [ ] weight loss [ ] weight gain  HEENT:                  [ x] negative [ ] dry eyes [ ] eye irritation [ ] postnasal drip [ ] nasal congestion  CV:                         [ x] negative  [ ] chest pain [ ] orthopnea [ ] palpitations [ ] murmur  Resp:                     [ x] negative [ ] cough [ ] shortness of breath [ ] dyspnea [ ] wheezing [ ] sputum [ ] hemoptysis  GI:                          [x ] negative [ ] nausea [ ] vomiting [ ] diarrhea [ ] constipation [ ] abd pain [ ] dysphagia   :                        [ x] negative [ ] dysuria [ ] nocturia [ ] hematuria [ ] increased urinary frequency  Musculoskeletal: [x ] negative [ ] back pain [ ] myalgias [ ] arthralgias [ ] fracture  Skin:                       [x ] negative [ ] rash [ ] itch  Neurological:        [x ] negative [ ] headache [ ] dizziness [ ] syncope [ ] weakness [ ] numbness    [ ] All other systems negative  [ ] Unable to assess ROS because ________.    MEDICATIONS:  MEDICATIONS  (STANDING):  aMIOdarone    Tablet 400 milliGRAM(s) Oral every 8 hours  aMIOdarone    Tablet   Oral   aspirin  chewable 81 milliGRAM(s) Oral daily  atorvastatin 80 milliGRAM(s) Oral at bedtime  budesonide  80 MICROgram(s)/formoterol 4.5 MICROgram(s) Inhaler 2 Puff(s) Inhalation two times a day  carvedilol 6.25 milliGRAM(s) Oral every 12 hours  chlorhexidine 2% Cloths 1 Application(s) Topical daily  dextrose 50% Injectable 25 Gram(s) IV Push once  dextrose 50% Injectable 12.5 Gram(s) IV Push once  heparin  Infusion 1600 Unit(s)/Hr (12 mL/Hr) IV Continuous <Continuous>  hydrALAZINE 100 milliGRAM(s) Oral three times a day  insulin glargine Injectable (LANTUS) 8 Unit(s) SubCutaneous at bedtime  insulin lispro (ADMELOG) corrective regimen sliding scale   SubCutaneous three times a day before meals  insulin lispro (ADMELOG) corrective regimen sliding scale   SubCutaneous at bedtime  insulin lispro Injectable (ADMELOG) 8 Unit(s) SubCutaneous three times a day before meals  isosorbide   dinitrate Tablet (ISORDIL) 40 milliGRAM(s) Oral three times a day  lidocaine   4% Patch 1 Patch Transdermal every 24 hours  sodium chloride 3%  Inhalation 4 milliLiter(s) Inhalation every 12 hours  spironolactone 25 milliGRAM(s) Oral daily    MEDICATIONS  (PRN):  guaiFENesin Oral Liquid (Sugar-Free) 100 milliGRAM(s) Oral every 6 hours PRN Cough  LORazepam     Tablet 0.5 milliGRAM(s) Oral every 6 hours PRN Anxiety    ALLERGIES:  Allergies    penicillins (Unknown)    Intolerances    OBJECTIVE:  ICU Vital Signs Last 24 Hrs  T(C): 36.8 (2023 04:00), Max: 37.1 (15 Nov 2023 15:00)  T(F): 98.2 (2023 04:00), Max: 98.7 (15 Nov 2023 15:00)  HR: 84 (2023 06:00) (75 - 98)  BP: --  BP(mean): --  ABP: 99/56 (2023 06:00) (99/56 - 141/73)  ABP(mean): 81 (2023 06:00) (81 - 113)  RR: 25 (2023 06:00) (18 - 28)  SpO2: 94% (2023 06:00) (93% - 97%)    O2 Parameters below as of 15 Nov 2023 20:55  Patient On (Oxygen Delivery Method): room air      POCT Blood Glucose.: 161 mg/dL (15 Nov 2023 21:14)  POCT Blood Glucose.: 165 mg/dL (15 Nov 2023 17:50)  POCT Blood Glucose.: 135 mg/dL (15 Nov 2023 12:42)  POCT Blood Glucose.: 137 mg/dL (15 Nov 2023 08:03)    CAPILLARY BLOOD GLUCOSE      POCT Blood Glucose.: 161 mg/dL (15 Nov 2023 21:14)    I&O's Summary    2023 07:01  -  15 Nov 2023 07:00  --------------------------------------------------------  IN: 1032 mL / OUT: 1640 mL / NET: -608 mL    15 Nov 2023 07:01  -  2023 06:56  --------------------------------------------------------  IN: 1146 mL / OUT: 5 mL / NET: -879 mL      Daily     Daily Weight in k.8 (2023 04:00)    PHYSICAL EXAMINATION:  General: WN/WD NAD  HEENT: PERRLA, EOMI, moist mucous membranes  Neurology: A&Ox3, nonfocal, MOISE x 4  Respiratory: CTA B/L, normal respiratory effort, no wheezes, crackles, rales  CV: RRR, S1S2, no murmurs, rubs or gallops  Abdominal: Soft, NT, ND +BS, Last BM  Extremities: No edema, + palpable peripheral pulses  Skin: warm, dry  Lines: L fem IABP dressing clean, dry and intact     LABS:                          12.1   11.68 )-----------( 264      ( 2023 00:55 )             36.5     11-    135  |  104  |  36<H>  ----------------------------<  134<H>  4.4   |  17<L>  |  1.64<H>    Ca    9.9      2023 00:55  Phos  3.2     11-  Mg     1.9     -    TPro  7.7  /  Alb  3.9  /  TBili  0.6  /  DBili  x   /  AST  36  /  ALT  73<H>  /  AlkPhos  64  -16    LIVER FUNCTIONS - ( 2023 00:55 )  Alb: 3.9 g/dL / Pro: 7.7 g/dL / ALK PHOS: 64 U/L / ALT: 73 U/L / AST: 36 U/L / GGT: x           PT/INR - ( 2023 00:55 )   PT: 13.6 sec;   INR: 1.24 ratio         PTT - ( 2023 00:55 )  PTT:66.9 sec    Urinalysis Basic - ( 2023 00:55 )    Color: x / Appearance: x / SG: x / pH: x  Gluc: 134 mg/dL / Ketone: x  / Bili: x / Urobili: x   Blood: x / Protein: x / Nitrite: x   Leuk Esterase: x / RBC: x / WBC x   Sq Epi: x / Non Sq Epi: x / Bacteria: x

## 2023-11-16 NOTE — PROGRESS NOTE ADULT - NS ATTEND AMEND GEN_ALL_CORE FT
Patient found in bed in NAD. He remains on IABP support, no need for diuretics; he is euvolemic on exam. He is undergoing evaluation for advanced therapies. His renal function continues to improve. His MAP remains >100. He was started on low dose carvedilol yesterday which he is tolerating. He was presented today to the selection committee. There are no major concerns for listing him for transplant pending colonoscopy. He is 175 cm tall, ABO A, PRA 0%.     Continue 1:1 IABP support   Afterload reduction with hydralazine/ISDN/ MRA   Agree with increasing carvedilol to 12.5 mg BID   Get repeat CT chest w/o contrast   Nephrology evaluation given history of DM and ARIC   Hepatology follow up for clearance after elastography   GI follow up for colonoscopy   Psych follow up for h/o anxiety and frequent use of Benzos/ ?alternative agent   Patient needs Heb B vaccine   Continue PT Patient found in bed in NAD. He remains on IABP support, no need for diuretics; he is euvolemic on exam. He is undergoing evaluation for advanced therapies. His renal function continues to improve. His MAP remains >100. He was started on low dose carvedilol yesterday which he is tolerating. He was presented today to the selection committee. There are no major concerns for listing him for transplant pending colonoscopy. He is 175 cm tall, ABO A, PRA 0%.     Continue 1:1 IABP support   Afterload reduction with hydralazine/ISDN/ MRA   Agree with increasing carvedilol to 12.5 mg BID   Get repeat CT chest w/o contrast  Get pulmonary consult after CT chest    Nephrology evaluation given history of DM and ARIC   Hepatology follow up for clearance after elastography   GI follow up for colonoscopy   Psych follow up for h/o anxiety and frequent use of Benzos/ ?alternative agent   Patient needs Heb B vaccine   Continue PT

## 2023-11-16 NOTE — PROGRESS NOTE ADULT - PROBLEM SELECTOR PLAN 5
- Persistent  mild leukocytosis of unclear etiology  - Procalcitonin mildly elevated, please trend daily  - 11/10 BCx NGTD, UA negative and RVP negative

## 2023-11-16 NOTE — PROGRESS NOTE ADULT - SUBJECTIVE AND OBJECTIVE BOX
LD VALENCIA  58y Male  MRN:[id number]    Patient is a 58y old  Male who presents with a chief complaint of NSTEMI (01 Nov 2023 20:29)    HPI:  57yo M w/ hx HTN, CAD w/ 1 stent in 2009, ICH (2008) presenting with abn ekg. Patient presented to [facility] where he was found to have STEMI, recommended to get cath however patient did not want to get it there so it left and came here.  Patient initially had cough, congestion, fever, was placed on antibiotics on Sunday.  Started feeling nauseous and had a presyncopal event after which he presented to ED last night.  Had chest pain as well.  Chest pain is midsternal.  Not currently having chest pain.  Received 4 aspirin 30 min pta. (01 Nov 2023 15:11)      Patient seen and evaluated at bedside in CCU. interval events noted    Interval HPI: remain in ccu. IABP in place     PAST MEDICAL & SURGICAL HISTORY:  HTN (hypertension)      CAD (coronary artery disease)  2009; stent      Intracranial hemorrhage  2008      Respiratory arrest  december 1st      Myocardial infarction, unspecified MI type, unspecified artery      History of coronary artery stent placement          REVIEW OF SYSTEMS:  as per hpi     VITALS:   ICU Vital Signs Last 24 Hrs  T(C): 36.8 (16 Nov 2023 04:00), Max: 37.1 (15 Nov 2023 15:00)  T(F): 98.2 (16 Nov 2023 04:00), Max: 98.7 (15 Nov 2023 15:00)  HR: 78 (16 Nov 2023 11:00) (75 - 98)  BP: --  BP(mean): --  ABP: 117/62 (16 Nov 2023 11:00) (99/56 - 141/73)  ABP(mean): 94 (16 Nov 2023 11:00) (81 - 113)  RR: 20 (16 Nov 2023 11:00) (18 - 28)  SpO2: 95% (16 Nov 2023 11:00) (93% - 97%)    O2 Parameters below as of 16 Nov 2023 11:00  Patient On (Oxygen Delivery Method): room air         PHYSICAL EXAM:  GENERAL: NAD, comfortable   HEAD:  Atraumatic, Normocephalic  EYES: EOMI, PERRLA, conjunctiva and sclera clear  NECK: Supple, No JVD   CHEST/LUNG: Clear to auscultation bilaterally; No wheeze  HEART: Regular rate and rhythm; No murmurs, rubs, or gallops  ABDOMEN: Soft, Nontender, Nondistended; Bowel sounds present  EXTREMITIES:  2+ Peripheral Pulses, No clubbing, cyanosis, or edema  NEUROLOGY: nonfocal  SKIN: No rashes or lesions  +iabp    Consultant(s) Notes Reviewed:  [x ] YES  [ ] NO  Care Discussed with Consultants/Other Providers [ x] YES  [ ] NO    MEDS:   MEDICATIONS  (STANDING):  aMIOdarone    Tablet 400 milliGRAM(s) Oral every 8 hours  aMIOdarone    Tablet   Oral   aspirin  chewable 81 milliGRAM(s) Oral daily  atorvastatin 80 milliGRAM(s) Oral at bedtime  bisacodyl 10 milliGRAM(s) Oral once  budesonide  80 MICROgram(s)/formoterol 4.5 MICROgram(s) Inhaler 2 Puff(s) Inhalation two times a day  carvedilol 12.5 milliGRAM(s) Oral every 12 hours  chlorhexidine 2% Cloths 1 Application(s) Topical daily  dextrose 50% Injectable 25 Gram(s) IV Push once  dextrose 50% Injectable 12.5 Gram(s) IV Push once  heparin  Infusion 1600 Unit(s)/Hr (12 mL/Hr) IV Continuous <Continuous>  hydrALAZINE 100 milliGRAM(s) Oral three times a day  insulin glargine Injectable (LANTUS) 8 Unit(s) SubCutaneous at bedtime  insulin lispro (ADMELOG) corrective regimen sliding scale   SubCutaneous three times a day before meals  insulin lispro (ADMELOG) corrective regimen sliding scale   SubCutaneous at bedtime  insulin lispro Injectable (ADMELOG) 8 Unit(s) SubCutaneous three times a day before meals  isosorbide   dinitrate Tablet (ISORDIL) 40 milliGRAM(s) Oral three times a day  lidocaine   4% Patch 1 Patch Transdermal every 24 hours  polyethylene glycol/electrolyte Solution. 4000 milliLiter(s) Oral once  spironolactone 25 milliGRAM(s) Oral daily    MEDICATIONS  (PRN):  LORazepam     Tablet 0.5 milliGRAM(s) Oral every 6 hours PRN Anxiety        ALLERGIES:  penicillins (Unknown)      LABS:                                      12.1   11.68 )-----------( 264      ( 16 Nov 2023 00:55 )             36.5   11-16    135  |  104  |  36<H>  ----------------------------<  134<H>  4.4   |  17<L>  |  1.64<H>    Ca    9.9      16 Nov 2023 00:55  Phos  3.2     11-16  Mg     1.9     11-16    TPro  7.7  /  Alb  3.9  /  TBili  0.6  /  DBili  x   /  AST  36  /  ALT  73<H>  /  AlkPhos  64  11-16       < from: Xray Chest 1 View- PORTABLE-Urgent (11.01.23 @ 07:42) >    IMPRESSION:  Clear lungs.    ---End of Report ---        < end of copied text >  < from: TTE W or WO Ultrasound Enhancing Agent (11.01.23 @ 10:23) >  _____________________________     CONCLUSIONS:      1. Left ventricular cavity is moderately dilated. Left ventricular wall thickness is normal. Left ventricular systolic function is severely decreased with an ejection fraction of 32 % by Chinchilla's method of disks. Regional wall motion abnormalities present.   2. Multiple segmental abnormalities exist. See findings.   3. There is moderate (grade 2) left ventricular diastolic dysfunction, with indeterminant filling pressure.   4. Normal right ventricular cavity size, wall thickness, and systolic function.   5. No significant valvular disease.   6. No pericardial effusion seen.   7. Compared to the transthoracic echocardiogram performed on 1/25/2017 the areas of akinesis are unchanged but there has been a decline in LV systolic function with new areas of hypokinesis.    __________________________________________________________________    < end of copied text >  < from: TTE Limited W or WO Ultrasound Enhancing Agent (11.02.23 @ 07:41) >  __________________________     CONCLUSIONS:      1. After obtaining consent, Definity ultrasound enhancing agent was given for enhanced left ventricular opacification and improved delineation of the left ventricular endocardial borders. Left ventricular systolic function is severely decreased with a calculated ejection fraction of 22 % by the Chinchilla's biplane method of disks. There is a left ventricular thrombus.   2. Findings were discussed with Litzy BOSS on 11/2/2023 at 8.49am.   3. There is a left ventricular thrombus.    _________________________________________________________________    < end of copied text >

## 2023-11-16 NOTE — PROGRESS NOTE ADULT - SUBJECTIVE AND OBJECTIVE BOX
Subjective:  - No orthopnea, PND, CP or palpitations    Medications:  aMIOdarone    Tablet 400 milliGRAM(s) Oral every 8 hours  aMIOdarone    Tablet   Oral   aspirin  chewable 81 milliGRAM(s) Oral daily  atorvastatin 80 milliGRAM(s) Oral at bedtime  bisacodyl 10 milliGRAM(s) Oral once  budesonide  80 MICROgram(s)/formoterol 4.5 MICROgram(s) Inhaler 2 Puff(s) Inhalation two times a day  carvedilol 12.5 milliGRAM(s) Oral every 12 hours  chlorhexidine 2% Cloths 1 Application(s) Topical daily  dextrose 50% Injectable 25 Gram(s) IV Push once  dextrose 50% Injectable 12.5 Gram(s) IV Push once  heparin  Infusion 1600 Unit(s)/Hr IV Continuous <Continuous>  hydrALAZINE 100 milliGRAM(s) Oral three times a day  insulin glargine Injectable (LANTUS) 8 Unit(s) SubCutaneous at bedtime  insulin lispro (ADMELOG) corrective regimen sliding scale   SubCutaneous three times a day before meals  insulin lispro (ADMELOG) corrective regimen sliding scale   SubCutaneous at bedtime  insulin lispro Injectable (ADMELOG) 8 Unit(s) SubCutaneous three times a day before meals  isosorbide   dinitrate Tablet (ISORDIL) 40 milliGRAM(s) Oral three times a day  lidocaine   4% Patch 1 Patch Transdermal every 24 hours  LORazepam     Tablet 0.5 milliGRAM(s) Oral every 6 hours PRN  polyethylene glycol/electrolyte Solution. 4000 milliLiter(s) Oral once  spironolactone 25 milliGRAM(s) Oral daily    Physical Exam:    Vitals:  Vital Signs Last 24 Hours  T(C): 36.8 (23 @ 04:00), Max: 37.1 (11-15-23 @ 15:00)  HR: 78 (23 @ 11:00) (75 - 90)  BP: --  RR: 20 (23 @ 11:00) (18 - 28)  SpO2: 95% (23 @ 11:00) (93% - 97%)    Weight in k.8 ( @ 04:00)    I&O's Summary    15 Nov 2023 07:01  -  2023 07:00  --------------------------------------------------------  IN: 1158 mL / OUT: 5 mL / NET: -867 mL    2023 07:01  -  2023 13:25  --------------------------------------------------------  IN: 168 mL / OUT: 0 mL / NET: 168 mL    Tele: SR 90s    General: No distress. Comfortable.  HEENT: EOM intact.  Neck: Neck supple. JVP 6 cm H2O no HJR  Chest: Clear to auscultation bilaterally  CV: Normal S1 and S2. No murmurs, rub, or gallops. +2 DP pulse  Abdomen: Soft, non-distended, non-tender  Extremities: Warm peripherally. No edema   Skin: IABP in place   Neurology: Alert and oriented times three. Sensation intact  Psych: Affect normal    Labs:                        12.1   11.68 )-----------( 264      ( 2023 00:55 )             36.5         135  |  104  |  36<H>  ----------------------------<  134<H>  4.4   |  17<L>  |  1.64<H>    Ca    9.9      2023 00:55  Phos  3.2       Mg     1.9         TPro  7.7  /  Alb  3.9  /  TBili  0.6  /  DBili  x   /  AST  36  /  ALT  73<H>  /  AlkPhos  64      PT/INR - ( 2023 00:55 )   PT: 13.6 sec;   INR: 1.24 ratio         PTT - ( 2023 00:55 )  PTT:66.9 sec            Lactate, Blood: 0.7 mmol/L ( @ 00:55)

## 2023-11-16 NOTE — PHARMACOTHERAPY INTERVENTION NOTE - COMMENTS
57 y/o male is being evaluated for heart transplant.     Outpatient and inpatient medications were reviewed. Plan was discussed with multidisciplinary heart transplant team.     The patient has no contraindications from pharmacy standpoint and is cleared to receive a heart transplant. The patient will need to receive hepatitis B vaccine series if feasible prior to the transplant surgery. The patient has been on lorazepam pre-transplant - will encourage to consider SSRI pre-transplant to be initiated for the management of anxiety to minimize benzodiazapine use. The patient has been on amiodarone pre-transplant. Brought up to the team that the patient has been on Duoneb at home and Symbicort now. Lastly, the patient will need a better blood glucose control pre- and post-transplant.    Selene Moran, Pharm.D.  [phone]

## 2023-11-17 LAB
ALBUMIN SERPL ELPH-MCNC: 3.9 G/DL — SIGNIFICANT CHANGE UP (ref 3.3–5)
ALBUMIN SERPL ELPH-MCNC: 3.9 G/DL — SIGNIFICANT CHANGE UP (ref 3.3–5)
ALP SERPL-CCNC: 65 U/L — SIGNIFICANT CHANGE UP (ref 40–120)
ALP SERPL-CCNC: 65 U/L — SIGNIFICANT CHANGE UP (ref 40–120)
ALT FLD-CCNC: 63 U/L — HIGH (ref 10–45)
ALT FLD-CCNC: 63 U/L — HIGH (ref 10–45)
ANION GAP SERPL CALC-SCNC: 16 MMOL/L — SIGNIFICANT CHANGE UP (ref 5–17)
ANION GAP SERPL CALC-SCNC: 16 MMOL/L — SIGNIFICANT CHANGE UP (ref 5–17)
APTT BLD: 69.7 SEC — HIGH (ref 24.5–35.6)
APTT BLD: 69.7 SEC — HIGH (ref 24.5–35.6)
AST SERPL-CCNC: 31 U/L — SIGNIFICANT CHANGE UP (ref 10–40)
AST SERPL-CCNC: 31 U/L — SIGNIFICANT CHANGE UP (ref 10–40)
BASOPHILS # BLD AUTO: 0.06 K/UL — SIGNIFICANT CHANGE UP (ref 0–0.2)
BASOPHILS # BLD AUTO: 0.06 K/UL — SIGNIFICANT CHANGE UP (ref 0–0.2)
BASOPHILS NFR BLD AUTO: 0.4 % — SIGNIFICANT CHANGE UP (ref 0–2)
BASOPHILS NFR BLD AUTO: 0.4 % — SIGNIFICANT CHANGE UP (ref 0–2)
BILIRUB SERPL-MCNC: 0.7 MG/DL — SIGNIFICANT CHANGE UP (ref 0.2–1.2)
BILIRUB SERPL-MCNC: 0.7 MG/DL — SIGNIFICANT CHANGE UP (ref 0.2–1.2)
BUN SERPL-MCNC: 32 MG/DL — HIGH (ref 7–23)
BUN SERPL-MCNC: 32 MG/DL — HIGH (ref 7–23)
CALCIUM SERPL-MCNC: 9.5 MG/DL — SIGNIFICANT CHANGE UP (ref 8.4–10.5)
CALCIUM SERPL-MCNC: 9.5 MG/DL — SIGNIFICANT CHANGE UP (ref 8.4–10.5)
CHLORIDE SERPL-SCNC: 99 MMOL/L — SIGNIFICANT CHANGE UP (ref 96–108)
CHLORIDE SERPL-SCNC: 99 MMOL/L — SIGNIFICANT CHANGE UP (ref 96–108)
CO2 SERPL-SCNC: 16 MMOL/L — LOW (ref 22–31)
CO2 SERPL-SCNC: 16 MMOL/L — LOW (ref 22–31)
CREAT SERPL-MCNC: 1.62 MG/DL — HIGH (ref 0.5–1.3)
CREAT SERPL-MCNC: 1.62 MG/DL — HIGH (ref 0.5–1.3)
EGFR: 49 ML/MIN/1.73M2 — LOW
EGFR: 49 ML/MIN/1.73M2 — LOW
EOSINOPHIL # BLD AUTO: 0.32 K/UL — SIGNIFICANT CHANGE UP (ref 0–0.5)
EOSINOPHIL # BLD AUTO: 0.32 K/UL — SIGNIFICANT CHANGE UP (ref 0–0.5)
EOSINOPHIL NFR BLD AUTO: 2.4 % — SIGNIFICANT CHANGE UP (ref 0–6)
EOSINOPHIL NFR BLD AUTO: 2.4 % — SIGNIFICANT CHANGE UP (ref 0–6)
GLUCOSE BLDC GLUCOMTR-MCNC: 137 MG/DL — HIGH (ref 70–99)
GLUCOSE BLDC GLUCOMTR-MCNC: 137 MG/DL — HIGH (ref 70–99)
GLUCOSE BLDC GLUCOMTR-MCNC: 146 MG/DL — HIGH (ref 70–99)
GLUCOSE BLDC GLUCOMTR-MCNC: 146 MG/DL — HIGH (ref 70–99)
GLUCOSE BLDC GLUCOMTR-MCNC: 158 MG/DL — HIGH (ref 70–99)
GLUCOSE BLDC GLUCOMTR-MCNC: 158 MG/DL — HIGH (ref 70–99)
GLUCOSE BLDC GLUCOMTR-MCNC: 249 MG/DL — HIGH (ref 70–99)
GLUCOSE BLDC GLUCOMTR-MCNC: 249 MG/DL — HIGH (ref 70–99)
GLUCOSE SERPL-MCNC: 146 MG/DL — HIGH (ref 70–99)
GLUCOSE SERPL-MCNC: 146 MG/DL — HIGH (ref 70–99)
HCT VFR BLD CALC: 36.8 % — LOW (ref 39–50)
HCT VFR BLD CALC: 36.8 % — LOW (ref 39–50)
HGB BLD-MCNC: 12.2 G/DL — LOW (ref 13–17)
HGB BLD-MCNC: 12.2 G/DL — LOW (ref 13–17)
IMM GRANULOCYTES NFR BLD AUTO: 0.6 % — SIGNIFICANT CHANGE UP (ref 0–0.9)
IMM GRANULOCYTES NFR BLD AUTO: 0.6 % — SIGNIFICANT CHANGE UP (ref 0–0.9)
INR BLD: 1.39 RATIO — HIGH (ref 0.85–1.18)
INR BLD: 1.39 RATIO — HIGH (ref 0.85–1.18)
LACTATE SERPL-SCNC: 0.8 MMOL/L — SIGNIFICANT CHANGE UP (ref 0.5–2)
LACTATE SERPL-SCNC: 0.8 MMOL/L — SIGNIFICANT CHANGE UP (ref 0.5–2)
LYMPHOCYTES # BLD AUTO: 1.97 K/UL — SIGNIFICANT CHANGE UP (ref 1–3.3)
LYMPHOCYTES # BLD AUTO: 1.97 K/UL — SIGNIFICANT CHANGE UP (ref 1–3.3)
LYMPHOCYTES # BLD AUTO: 14.7 % — SIGNIFICANT CHANGE UP (ref 13–44)
LYMPHOCYTES # BLD AUTO: 14.7 % — SIGNIFICANT CHANGE UP (ref 13–44)
MAGNESIUM SERPL-MCNC: 2 MG/DL — SIGNIFICANT CHANGE UP (ref 1.6–2.6)
MAGNESIUM SERPL-MCNC: 2 MG/DL — SIGNIFICANT CHANGE UP (ref 1.6–2.6)
MCHC RBC-ENTMCNC: 28.8 PG — SIGNIFICANT CHANGE UP (ref 27–34)
MCHC RBC-ENTMCNC: 28.8 PG — SIGNIFICANT CHANGE UP (ref 27–34)
MCHC RBC-ENTMCNC: 33.2 GM/DL — SIGNIFICANT CHANGE UP (ref 32–36)
MCHC RBC-ENTMCNC: 33.2 GM/DL — SIGNIFICANT CHANGE UP (ref 32–36)
MCV RBC AUTO: 86.8 FL — SIGNIFICANT CHANGE UP (ref 80–100)
MCV RBC AUTO: 86.8 FL — SIGNIFICANT CHANGE UP (ref 80–100)
MONOCYTES # BLD AUTO: 0.78 K/UL — SIGNIFICANT CHANGE UP (ref 0–0.9)
MONOCYTES # BLD AUTO: 0.78 K/UL — SIGNIFICANT CHANGE UP (ref 0–0.9)
MONOCYTES NFR BLD AUTO: 5.8 % — SIGNIFICANT CHANGE UP (ref 2–14)
MONOCYTES NFR BLD AUTO: 5.8 % — SIGNIFICANT CHANGE UP (ref 2–14)
NEUTROPHILS # BLD AUTO: 10.22 K/UL — HIGH (ref 1.8–7.4)
NEUTROPHILS # BLD AUTO: 10.22 K/UL — HIGH (ref 1.8–7.4)
NEUTROPHILS NFR BLD AUTO: 76.1 % — SIGNIFICANT CHANGE UP (ref 43–77)
NEUTROPHILS NFR BLD AUTO: 76.1 % — SIGNIFICANT CHANGE UP (ref 43–77)
NRBC # BLD: 0 /100 WBCS — SIGNIFICANT CHANGE UP (ref 0–0)
NRBC # BLD: 0 /100 WBCS — SIGNIFICANT CHANGE UP (ref 0–0)
PHOSPHATE SERPL-MCNC: 3.1 MG/DL — SIGNIFICANT CHANGE UP (ref 2.5–4.5)
PHOSPHATE SERPL-MCNC: 3.1 MG/DL — SIGNIFICANT CHANGE UP (ref 2.5–4.5)
PLATELET # BLD AUTO: 295 K/UL — SIGNIFICANT CHANGE UP (ref 150–400)
PLATELET # BLD AUTO: 295 K/UL — SIGNIFICANT CHANGE UP (ref 150–400)
POTASSIUM SERPL-MCNC: 4.8 MMOL/L — SIGNIFICANT CHANGE UP (ref 3.5–5.3)
POTASSIUM SERPL-MCNC: 4.8 MMOL/L — SIGNIFICANT CHANGE UP (ref 3.5–5.3)
POTASSIUM SERPL-SCNC: 4.8 MMOL/L — SIGNIFICANT CHANGE UP (ref 3.5–5.3)
POTASSIUM SERPL-SCNC: 4.8 MMOL/L — SIGNIFICANT CHANGE UP (ref 3.5–5.3)
PROCALCITONIN SERPL-MCNC: 0.12 NG/ML — HIGH (ref 0.02–0.1)
PROCALCITONIN SERPL-MCNC: 0.12 NG/ML — HIGH (ref 0.02–0.1)
PROT SERPL-MCNC: 7.8 G/DL — SIGNIFICANT CHANGE UP (ref 6–8.3)
PROT SERPL-MCNC: 7.8 G/DL — SIGNIFICANT CHANGE UP (ref 6–8.3)
PROTHROM AB SERPL-ACNC: 14.5 SEC — HIGH (ref 9.5–13)
PROTHROM AB SERPL-ACNC: 14.5 SEC — HIGH (ref 9.5–13)
RBC # BLD: 4.24 M/UL — SIGNIFICANT CHANGE UP (ref 4.2–5.8)
RBC # BLD: 4.24 M/UL — SIGNIFICANT CHANGE UP (ref 4.2–5.8)
RBC # FLD: 14 % — SIGNIFICANT CHANGE UP (ref 10.3–14.5)
RBC # FLD: 14 % — SIGNIFICANT CHANGE UP (ref 10.3–14.5)
SODIUM SERPL-SCNC: 131 MMOL/L — LOW (ref 135–145)
SODIUM SERPL-SCNC: 131 MMOL/L — LOW (ref 135–145)
UFH PPP CHRO-ACNC: 0.67 IU/ML — SIGNIFICANT CHANGE UP (ref 0.3–0.7)
UFH PPP CHRO-ACNC: 0.67 IU/ML — SIGNIFICANT CHANGE UP (ref 0.3–0.7)
WBC # BLD: 13.43 K/UL — HIGH (ref 3.8–10.5)
WBC # BLD: 13.43 K/UL — HIGH (ref 3.8–10.5)
WBC # FLD AUTO: 13.43 K/UL — HIGH (ref 3.8–10.5)
WBC # FLD AUTO: 13.43 K/UL — HIGH (ref 3.8–10.5)

## 2023-11-17 PROCEDURE — 93010 ELECTROCARDIOGRAM REPORT: CPT

## 2023-11-17 PROCEDURE — 45378 DIAGNOSTIC COLONOSCOPY: CPT | Mod: GC

## 2023-11-17 PROCEDURE — 71045 X-RAY EXAM CHEST 1 VIEW: CPT | Mod: 26

## 2023-11-17 PROCEDURE — 99291 CRITICAL CARE FIRST HOUR: CPT

## 2023-11-17 PROCEDURE — 99292 CRITICAL CARE ADDL 30 MIN: CPT | Mod: 25

## 2023-11-17 PROCEDURE — 71250 CT THORAX DX C-: CPT | Mod: 26

## 2023-11-17 PROCEDURE — 99232 SBSQ HOSP IP/OBS MODERATE 35: CPT

## 2023-11-17 PROCEDURE — 99292 CRITICAL CARE ADDL 30 MIN: CPT

## 2023-11-17 DEVICE — NET RETRV ROT ROTH 2.5MMX230CM: Type: IMPLANTABLE DEVICE | Status: FUNCTIONAL

## 2023-11-17 RX ORDER — CARVEDILOL PHOSPHATE 80 MG/1
25 CAPSULE, EXTENDED RELEASE ORAL EVERY 12 HOURS
Refills: 0 | Status: DISCONTINUED | OUTPATIENT
Start: 2023-11-17 | End: 2023-11-22

## 2023-11-17 RX ADMIN — AMIODARONE HYDROCHLORIDE 400 MILLIGRAM(S): 400 TABLET ORAL at 05:07

## 2023-11-17 RX ADMIN — ATORVASTATIN CALCIUM 80 MILLIGRAM(S): 80 TABLET, FILM COATED ORAL at 21:51

## 2023-11-17 RX ADMIN — Medication 100 MILLIGRAM(S): at 05:07

## 2023-11-17 RX ADMIN — Medication 100 MILLIGRAM(S): at 21:51

## 2023-11-17 RX ADMIN — CARVEDILOL PHOSPHATE 12.5 MILLIGRAM(S): 80 CAPSULE, EXTENDED RELEASE ORAL at 05:07

## 2023-11-17 RX ADMIN — CHLORHEXIDINE GLUCONATE 1 APPLICATION(S): 213 SOLUTION TOPICAL at 22:29

## 2023-11-17 RX ADMIN — CARVEDILOL PHOSPHATE 25 MILLIGRAM(S): 80 CAPSULE, EXTENDED RELEASE ORAL at 18:32

## 2023-11-17 RX ADMIN — BUDESONIDE AND FORMOTEROL FUMARATE DIHYDRATE 2 PUFF(S): 160; 4.5 AEROSOL RESPIRATORY (INHALATION) at 08:32

## 2023-11-17 RX ADMIN — INSULIN GLARGINE 8 UNIT(S): 100 INJECTION, SOLUTION SUBCUTANEOUS at 21:52

## 2023-11-17 RX ADMIN — HEPARIN SODIUM 12 UNIT(S)/HR: 5000 INJECTION INTRAVENOUS; SUBCUTANEOUS at 05:08

## 2023-11-17 RX ADMIN — ISOSORBIDE DINITRATE 40 MILLIGRAM(S): 5 TABLET ORAL at 05:07

## 2023-11-17 RX ADMIN — Medication 8 UNIT(S): at 16:52

## 2023-11-17 RX ADMIN — Medication 2: at 16:53

## 2023-11-17 RX ADMIN — SPIRONOLACTONE 25 MILLIGRAM(S): 25 TABLET, FILM COATED ORAL at 05:07

## 2023-11-17 RX ADMIN — Medication 81 MILLIGRAM(S): at 15:03

## 2023-11-17 RX ADMIN — Medication 100 MILLIGRAM(S): at 15:03

## 2023-11-17 RX ADMIN — AMIODARONE HYDROCHLORIDE 400 MILLIGRAM(S): 400 TABLET ORAL at 15:04

## 2023-11-17 RX ADMIN — AMIODARONE HYDROCHLORIDE 400 MILLIGRAM(S): 400 TABLET ORAL at 21:51

## 2023-11-17 RX ADMIN — ISOSORBIDE DINITRATE 40 MILLIGRAM(S): 5 TABLET ORAL at 16:38

## 2023-11-17 RX ADMIN — Medication 0.5 MILLIGRAM(S): at 07:49

## 2023-11-17 NOTE — PROGRESS NOTE ADULT - PROBLEM SELECTOR PLAN 5
- Persistent  mild leukocytosis of unclear etiology  - Procalcitonin mildly elevated, please trend daily  - 11/10 BCx NGTD, UA negative and RVP negative; please send repeat Bld Cx given rising WBC

## 2023-11-17 NOTE — PROGRESS NOTE ADULT - ASSESSMENT
====================ASSESSMENT =============  57 yo male with HTN, CAD (s/p PCI 2008), HFrEF, CVA 2018, and T2DM presenting with chest pressure and unknown tachycardia that was shocked x1, [facility] 11/1 found to have in-stent restenosis of pLAD and  of RCA with elevated RA and PA pressures and severely decreased EF 32%, admitted to CICU for management of cardiogenic shock requiring IABP 11/1 -11/7, with hospital course c/b vfib arrest requiring reinsertion of IABP.     Plan:  ====================== NEUROLOGY=====================  # Anxiety  - Ativan 0.5 mg PO Q 6hrs PRN   - No Seroquel or antipsychotics since vfib arrest and prolonged QTC  - Psych Eval last week, recs for continuing Ativan PRN and had recommended SSRI, but pt. refused     # Pain 2/2 rib fractures post CPR  - c/w multimodal pain control w/ tylenol and lidoderm TD    ==================== RESPIRATORY======================  # Acute Hypoxemic Respiratory Failure  - s/p 2 intubations for cardiogenic pulm edema and the in setting of cardiac arrest  - Extubated 11/10  - currently on room air with spO2 mid 90s    # COVID +  - off isolation: cleared by ID 11/11     # Asthma (hx of respiratory failure in 2018- intubated for 20 minutes per chart review pt report)  - c/w albuterol, symbicort and spiriva  - on trelegy at home    ====================CARDIOVASCULAR==================  # Vfib arrest insetting of ischemia vs. shock  - Lido off since 11/13   - PO Amio load dose (s/p IV amio ~1950mg IV gtt) for total of 5 grams per EP to complete 11/17  - Keep K > 4, Mag > 2.2     # Cardiogenic shock requiring IABP (11/1- 11/7, 11/9-)  - likely 2/2 NSTEMI and ADHF  - 11/1 LHC: pLAD 100 % in-stent restenosis & mRCA, 100 %. PCWP 30. IABP placed.  - 11/1 TTE: LV dilated. EF 32 %. Regional WMAs present, mod (grade 2) LV diastolic dysfunction  - 11/2 TTE: EF 22% and + LV thrombus   - IABP removed 11/7, vfib 11/9, IABP later reinserted that day for CS  - D/C Milrinone gtt 7:30 am 11/11  - Currently on hydralazine 100 TID and ISD 40 TID for AL reduction\  - continue coreg, uptitrate as tolerated  - Holding diuretics at this time, PRN Bumex IVP as needed  - Continue romel 25 daily for GDMT  - AT Eval, F/u HF recs    # NSTEMI- Stent re-occlusion of pLAD and 100%  of RCA  - EKG on admission w/LBBB  - DAPT: c/w ASA, Brilinta d/c'd per transplant w/u  - Cont. lipitor 80  - cMR deferred given necessity of IABP  - CT sx not recommending CABG, undergoing AT eval    # LV thrombus  - c/w heparin gtt    ===================HEMATOLOGIC/ONC ===================  - H/H and platelets stable  - continue to monitor daily  # VTE prophylaxis   - c/w heparin gtt given LV Thrombus and IABP     ===================== RENAL =========================  # Non-oliguric ARIC   - sCr now improving. Baseline Cr: 1-1.22  - Diuresis held, optimize pre-renal factors for optimal perfusion and volume status  - Trend BMP, lytes daily, replace as needed  - Continue Strict I/Os, avoid nephrotoxins    ==================== GASTROINTESTINAL===================  -Tolerating DASH diet  GERD  - c/w home protonix    Bowel regimen  - Miralax and senna d/c'd iso loose stools- trend quantity and quality of BMs    =======================    ENDOCRINE  =====================  Type 2 DM  - A1c 8.3, sugars uncontrolled on admission likely stress induced s/p cardiac arrest  - insulin gtt transitioned off 11/11 AM  - lantus 8, premeal 8, low ISS    ========================INFECTIOUS DISEASE================  - Afebrile, continue to monitor off abx  - Concern for aspiration event prior to intubation on admission - s/p vanco and cefepime  - BCx 11/10 NGTD, RVP neg, MRSA neg, UA neg  - Mild fluctuations in WBCs w/o fever and no left shift    COVID  - Off airborne precautions 11/11    Pre-transplant ID w/u   - trend ID recs for serologies     LINES:   RICANDY Roberts 11/9-11/14, RAx Jacklyn 11/9, LFA IABP 11/9     ====================ASSESSMENT =============  57 yo male with HTN, CAD (s/p PCI 2008), HFrEF, CVA 2018, and T2DM presenting with chest pressure and unknown tachycardia that was shocked x1, [facility] 11/1 found to have in-stent restenosis of pLAD and  of RCA with elevated RA and PA pressures and severely decreased EF 32%, admitted to CICU for management of cardiogenic shock requiring IABP 11/1 -11/7, with hospital course c/b vfib arrest requiring reinsertion of IABP.     Plan:  ====================== NEUROLOGY=====================  # Anxiety  - Ativan 0.5 mg PO Q 6hrs PRN   - No Seroquel or antipsychotics since vfib arrest and prolonged QTC  - Psych Eval last week, recs for continuing Ativan PRN and had recommended SSRI, but pt. refused     ==================== RESPIRATORY======================  # Acute Hypoxemic Respiratory Failure  - s/p 2 intubations for cardiogenic pulm edema and the in setting of cardiac arrest  - Extubated 11/10  - currently on room air with spO2 mid 90s    # Asthma (hx of respiratory failure in 2018- intubated for 20 minutes per chart review pt report)  - c/w albuterol, symbicort and spiriva  - on trelegy at home    ====================CARDIOVASCULAR==================  # Vfib arrest insetting of ischemia vs. shock  - Lido off since 11/13   - PO Amio load dose (s/p IV amio ~1950mg IV gtt) for total of 5 grams per EP to complete 11/17  - Keep K > 4, Mag > 2.2     # Cardiogenic shock requiring IABP (11/1- 11/7, 11/9-)  - likely 2/2 NSTEMI and ADHF  - 11/1 LHC: pLAD 100 % in-stent restenosis & mRCA, 100 %. PCWP 30. IABP placed.  - 11/1 TTE: LV dilated. EF 32 %. Regional WMAs present, mod (grade 2) LV diastolic dysfunction  - 11/2 TTE: EF 22% and + LV thrombus   - IABP removed 11/7, vfib 11/9, IABP later reinserted that day for CS  - D/C Milrinone gtt 7:30 am 11/11  - Currently on hydralazine 100 TID and ISD 40 TID for AL reduction  - continue coreg, uptitrate as tolerated  - Holding diuretics at this time, PRN Bumex IVP as needed  - Continue romel 25 daily for GDMT  - continue to monitor perfusion indices daily  - AT Eval, F/u HF recs    # NSTEMI- Stent re-occlusion of pLAD and 100%  of RCA  - EKG on admission w/LBBB  - DAPT: c/w ASA, Brilinta d/c'd per transplant w/u  - Cont. lipitor 80  - cMR deferred given necessity of IABP  - CT sx not recommending CABG, undergoing AT eval    # LV thrombus  - c/w heparin gtt    ===================HEMATOLOGIC/ONC ===================  - H/H and platelets stable  - continue to monitor daily  # VTE prophylaxis   - c/w heparin gtt given LV Thrombus and IABP     ===================== RENAL =========================  # Non-oliguric ARIC   - sCr now improving. Baseline Cr: 1-1.22  - Diuresis held, optimize pre-renal factors for optimal perfusion and volume status  - Trend BMP, lytes daily, replace as needed  - Continue Strict I/Os, avoid nephrotoxins    ==================== GASTROINTESTINAL===================  - NPO pending colonoscopy  GERD  - c/w home protonix    Bowel regimen  - Miralax and senna d/c'd iso loose stools- trend quantity and quality of BMs    =======================    ENDOCRINE  =====================  Type 2 DM  - A1c 8.3, sugars uncontrolled on admission likely stress induced s/p cardiac arrest  - insulin gtt transitioned off 11/11 AM  - continue lantus 8, premeal 8, low ISS    ========================INFECTIOUS DISEASE================  - Afebrile, continue to monitor off abx  - Concern for aspiration event prior to intubation on admission - s/p vanco and cefepime  - BCx 11/10 NGTD, RVP neg, MRSA neg, UA neg  - Mild fluctuations in WBCs w/o fever and no left shift, continue to monitor    COVID  - Off airborne precautions 11/11    Pre-transplant ID w/u   - trend ID recs for serologies   - chest CT    LINES:   RICANDY Roberts 11/9-11/14, RAx Jacklyn 11/9, LFA IABP 11/9    Dispo: remain in CICU

## 2023-11-17 NOTE — PROGRESS NOTE ADULT - SUBJECTIVE AND OBJECTIVE BOX
LD VALENCIA  MRN-[id number]  Patient is a 58y old  Male who presents with a chief complaint of advanced cardiac work up for CHF (2023 17:09)    HPI:  pt seen and approx 1:30 pm  in [facility]    59yo M w/ hx HTN, CAD w/ 1 stent in , ICH () presenting with abn ekg. Patient presented to [facility] where he was found to have STEMI, recommended to get cath however patient did not want to get it there so it left and came here.  Patient initially had cough, congestion, fever, was placed on antibiotics on .  Started feeling nauseous and had a presyncopal event after which he presented to ED last night.  Had chest pain as well.  Chest pain is midsternal.  Not currently having chest pain.  Received 4 aspirin 30 min pta. (2023 15:11)    Hospital Course:  24 HOUR EVENTS:  REVIEW OF SYSTEMS:    CONSTITUTIONAL: No weakness, fevers or chills  EYES/ENT: No visual changes;  No vertigo or throat pain   NECK: No pain or stiffness  RESPIRATORY: No cough, wheezing, hemoptysis; No shortness of breath  CARDIOVASCULAR: No chest pain or palpitations  GASTROINTESTINAL: No abdominal or epigastric pain. No nausea, vomiting, or hematemesis; No diarrhea or constipation. No melena or hematochezia.  GENITOURINARY: No dysuria, frequency or hematuria  NEUROLOGICAL: No numbness or weakness  SKIN: No itching, rashes    ICU Vital Signs Last 24 Hrs  T(C): 37.3 (2023 19:00), Max: 37.3 (2023 19:00)  T(F): 99.1 (2023 19:00), Max: 99.1 (2023 19:00)  HR: 77 (2023 20:00) (70 - 88)  BP: 108/58 (2023 10:03) (108/58 - 108/58)  BP(mean): 75 (2023 10:03) (75 - 75)  ABP: 110/62 (2023 20:00) (103/49 - 140/63)  ABP(mean): 88 (2023 20:00) (78 - 109)  RR: 20 (2023 20:00) (16 - 26)  SpO2: 95% (2023 20:00) (94% - 100%)    O2 Parameters below as of 2023 20:00  Patient On (Oxygen Delivery Method): room air      CVP(mm Hg): --  CO: --  CI: --  PA: --  PA(mean): --  PA(direct): --  PCWP: --  LA: --  RA: --  SVR: --  SVRI: --  PVR: --  PVRI: --  I&O's Summary    2023 07:01  -  2023 07:00  --------------------------------------------------------  IN: 868 mL / OUT: 1325 mL / NET: -457 mL    2023 07:01  -  2023 20:20  --------------------------------------------------------  IN: 696 mL / OUT: 850 mL / NET: -154 mL        CAPILLARY BLOOD GLUCOSE    CAPILLARY BLOOD GLUCOSE      POCT Blood Glucose.: 249 mg/dL (2023 16:34)    PHYSICAL EXAM:  GENERAL: No acute distress, well-developed  HEAD:  Atraumatic, Normocephalic  EYES: EOMI, PERRLA, conjunctiva and sclera clear  NECK: Supple, no lymphadenopathy, no JVD  CHEST/LUNG: CTAB; No wheezes, rales, or rhonchi  HEART: Regular rate and rhythm. Normal S1/S2. No murmurs, rubs, or gallops  ABDOMEN: Soft, non-tender, non-distended; normal bowel sounds, no organomegaly  EXTREMITIES:  2+ peripheral pulses b/l, No clubbing, cyanosis, or edema  NEUROLOGY: A&O x 3, no focal deficits  SKIN: No rashes or lesions  ============================I/O===========================   I&O's Detail    2023 07:01  -  2023 07:00  --------------------------------------------------------  IN:    Heparin: 288 mL    Oral Fluid: 580 mL  Total IN: 868 mL    OUT:    Voided (mL): 1325 mL  Total OUT: 1325 mL    Total NET: -457 mL      2023 07:01  -  2023 20:20  --------------------------------------------------------  IN:    Heparin: 156 mL    Oral Fluid: 540 mL  Total IN: 696 mL    OUT:    Voided (mL): 850 mL  Total OUT: 850 mL    Total NET: -154 mL    ============================ LABS =========================                        12.2   13.43 )-----------( 295      ( 2023 01:45 )             36.8         131<L>  |  99  |  32<H>  ----------------------------<  146<H>  4.8   |  16<L>  |  1.62<H>    Ca    9.5      2023 01:45  Phos  3.1       Mg     2.0         TPro  7.8  /  Alb  3.9  /  TBili  0.7  /  DBili  x   /  AST  31  /  ALT  63<H>  /  AlkPhos  65        LIVER FUNCTIONS - ( 2023 01:45 )  Alb: 3.9 g/dL / Pro: 7.8 g/dL / ALK PHOS: 65 U/L / ALT: 63 U/L / AST: 31 U/L / GGT: x           PT/INR - ( 2023 01:45 )   PT: 14.5 sec;   INR: 1.39 ratio         PTT - ( 2023 01:45 )  PTT:69.7 sec    Lactate, Blood: 0.8 mmol/L (23 @ 01:45)  Lactate, Blood: 0.7 mmol/L (23 @ 00:55)    Urinalysis Basic - ( 2023 01:45 )    Color: x / Appearance: x / SG: x / pH: x  Gluc: 146 mg/dL / Ketone: x  / Bili: x / Urobili: x   Blood: x / Protein: x / Nitrite: x   Leuk Esterase: x / RBC: x / WBC x   Sq Epi: x / Non Sq Epi: x / Bacteria: x      ======================Micro/Rad/Cardio=================  Telemtry: Reviewed   EKG: Reviewed  CXR: Reviewed  Culture: Reviewed   Echo:   Cath: Cardiac Cath Lab - Adult:   [facility]  Department of Cardiology  [address] [place] [address]  [phone]  Cath Lab Report -- Comprehensive Report  Patient: LD VALENCIA  Study date: 2017  Account number: [id number]  MR number: [id number]  : 1965  Gender: Male  Race: W  Case Physician(s):  Jairon Holguin M.D.  Referring Physician:  Luc Lynn M.D.  INDICATIONS: Unstable angina - CCS4.  HISTORY: The patient has a history of coronary artery disease. The patient  hashypertension and medication-treated dyslipidemia.  PROCEDURE:  --  Left heart catheterization with ventriculography.  --  Left coronary angiography.  --  Right coronary angiography.  TECHNIQUE: The risks and alternatives of the procedures and conscious  sedation were explained to the patient and informed consent was obtained.  Cardiac catheterization performed electively.  Local anesthetic given. Right radial artery access. A 6FR PRELUDE KIT was  inserted in the vessel. Left heart catheterization. Ventriculography was  performed. A 5FR FR4.0 EXPO catheter was utilized. Left coronary artery  angiography. The vessel was injected utilizing a 5FR FL3.5 EXPO catheter.  Right coronary artery angiography. The vessel was injected utilizing a 5FR  FR4.0 EXPO catheter. RADIATION EXPOSURE: 1.1 min.  CONTRAST GIVEN: Omnipaque 55 ml.  MEDICATIONS GIVEN: Midazolam, 1 mg, IV. Fentanyl, 25 mcg, IV. Verapamil  (Isoptin, Calan, Covera), 2.5 mg, IA. Heparin, 3000 units, IA.  VENTRICLES: Global left ventricular function was moderately depressed. EF  estimated was 40 %.  CORONARY VESSELS: The coronary circulation is right dominant.  LM:   --  LM: Normal.  LAD:   --  Proximal LAD: There was a 50 % stenosis.  CX:   --  Circumflex: Normal.  RCA:   --  Mid RCA: There was a 40 % stenosis.  --  Distal RCA: There was a 50 % stenosis.  COMPLICATIONS: There were no complications.  DIAGNOSTIC RECOMMENDATIONS: The patient should continue with the present  medications.  Prepared and signed by  Jairon Holguin M.D.  Signed 2017 12:20:13  HEMODYNAMIC TABLES  Pressures:  Baseline/ Room Air  Pressures:  - HR: 78  Pressures:  - Rhythm:  Pressures:  -- Aortic Pressure (S/D/M): --/--/99  Pressures:  -- Left Ventricle (s/edp): 157/39/--  Outputs:  Baseline/ Room Air  Outputs:  -- CALCULATIONS[age]  Outputs:  -- CALCULATIONS: Body Surface Area: 2.05  Outputs:  -- CALCULATIONS: Height in cm: 175.00  Outputs:  -- CALCULATIONS: Sex: Male  Outputs:  -- CALCULATIONS: Weight in k.40 (17 @ 21:55)    ======================================================  PAST MEDICAL & SURGICAL HISTORY:  HTN (hypertension)    CAD (coronary artery disease)  ; stent    Intracranial hemorrhage      Respiratory arrest      Myocardial infarction, unspecified MI type, unspecified artery    History of coronary artery stent placement      Assessment and Plan:   · Assessment    ====================ASSESSMENT =============  59 yo male with HTN, CAD (s/p PCI ), HFrEF, CVA , and T2DM presenting with chest pressure and unknown tachycardia that was shocked x1, [facility]  found to have in-stent restenosis of pLAD and  of RCA with elevated RA and PA pressures and severely decreased EF 32%, admitted to CICU for management of cardiogenic shock requiring IABP  -, with hospital course c/b vfib arrest requiring reinsertion of IABP.     Plan:  ====================== NEUROLOGY=====================  # Anxiety  - Ativan 0.5 mg PO Q 6hrs PRN   - No Seroquel or antipsychotics since vfib arrest and prolonged QTC  - Psych Eval last week, recs for continuing Ativan PRN and had recommended SSRI, but pt. refused     ==================== RESPIRATORY======================  # Acute Hypoxemic Respiratory Failure  - s/p 2 intubations for cardiogenic pulm edema and the in setting of cardiac arrest  - Extubated 11/10  - currently on room air with spO2 mid 90s    # Asthma (hx of respiratory failure in 2018- intubated for 20 minutes per chart review pt report)  - c/w albuterol, symbicort and spiriva  - on trelegy at home    ====================CARDIOVASCULAR==================  # Vfib arrest insetting of ischemia vs. shock  - Lido off since    - PO Amio load dose (s/p IV amio ~1950mg IV gtt) for total of 5 grams per EP to complete   - Keep K > 4, Mag > 2.2     # Cardiogenic shock requiring IABP (- , -)  - likely 2/2 NSTEMI and ADHF  -  LH: pLAD 100 % in-stent restenosis & mRCA, 100 %. PCWP 30. IABP placed.  -  TTE: LV dilated. EF 32 %. Regional WMAs present, mod (grade 2) LV diastolic dysfunction  -  TTE: EF 22% and + LV thrombus   - IABP removed , vfib , IABP later reinserted that day for CS  - D/C Milrinone gtt 7:30 am   - Currently on hydralazine 100 TID and ISD 40 TID for AL reduction  - continue coreg, uptitrate as tolerated  - Holding diuretics at this time, PRN Bumex IVP as needed  - Continue romel 25 daily for GDMT  - continue to monitor perfusion indices daily  - AT Eval, F/u HF recs    # NSTEMI- Stent re-occlusion of pLAD and 100%  of RCA  - EKG on admission w/LBBB  - DAPT: c/w ASA, Brilinta d/c'd per transplant w/u  - Cont. lipitor 80  - cMR deferred given necessity of IABP  - CT sx not recommending CABG, undergoing AT eval    # LV thrombus  - c/w heparin gtt    ===================HEMATOLOGIC/ONC ===================  - H/H and platelets stable  - continue to monitor daily  # VTE prophylaxis   - c/w heparin gtt given LV Thrombus and IABP     ===================== RENAL =========================  # Non-oliguric ARIC   - sCr now improving. Baseline Cr: 1-1.22  - Diuresis held, optimize pre-renal factors for optimal perfusion and volume status  - Trend BMP, lytes daily, replace as needed  - Continue Strict I/Os, avoid nephrotoxins    ==================== GASTROINTESTINAL===================  - NPO pending colonoscopy  GERD  - c/w home protonix    Bowel regimen  - Miralax and senna d/c'd iso loose stools- trend quantity and quality of BMs    =======================    ENDOCRINE  =====================  Type 2 DM  - A1c 8.3, sugars uncontrolled on admission likely stress induced s/p cardiac arrest  - insulin gtt transitioned off  AM  - continue lantus 8, premeal 8, low ISS    ========================INFECTIOUS DISEASE================  - Afebrile, continue to monitor off abx  - Concern for aspiration event prior to intubation on admission - s/p vanco and cefepime  - BCx 11/10 NGTD, RVP neg, MRSA neg, UA neg  - Mild fluctuations in WBCs w/o fever and no left shift, continue to monitor    COVID  - Off airborne precautions     Pre-transplant ID w/u   - trend ID recs for serologies   - chest CT    LINES:   RIJ [place] -, RAx Jacklyn , LFA IABP       Patient requires continuous monitoring with bedside rhythm monitoring, pulse ox monitoring, and intermittent blood gas analysis. Care plan discussed with ICU care team. Patient remained critical and at risk for life threatening decompensation.  Patient seen, examined and plan discussed with CCU team during rounds.   I have personally provided ____ minutes of critical care time excluding time spent on separate procedures, in addition to initial critical care time provided by the CICU Attending, Dr. Durand.    By signing my name below, I, Eleonora Marshall, attest that this documentation has been prepared under the direction and in the presence of Kirsty Davis NP   Electronically signed: Rosalba Talavera, 23 @ 20:21  I, Kirsty Davis NP, personally performed the services described in this documentation. all medical record entries made by the eileenibmartha were at my direction and in my presence. I have reviewed the chart and agree that the record reflects my personal performance and is accurate and complete  Electronically signed: Kirsty Davis NP  LD VALENCIA  MRN-[id number]  Patient is a 58y old  Male who presents with a chief complaint of advanced cardiac work up for CHF (2023 17:09)    HPI:  pt seen and approx 1:30 pm  in [facility]    57yo M w/ hx HTN, CAD w/ 1 stent in , ICH () presenting with abn ekg. Patient presented to [facility] where he was found to have STEMI, recommended to get cath however patient did not want to get it there so it left and came here.  Patient initially had cough, congestion, fever, was placed on antibiotics on .  Started feeling nauseous and had a presyncopal event after which he presented to ED last night.  Had chest pain as well.  Chest pain is midsternal.  Not currently having chest pain.  Received 4 aspirin 30 min pta. (2023 15:11)    Hospital Course:  24 HOUR EVENTS:  REVIEW OF SYSTEMS:    CONSTITUTIONAL: No weakness, fevers or chills  EYES/ENT: No visual changes;  No vertigo or throat pain   NECK: No pain or stiffness  RESPIRATORY: No cough, wheezing, hemoptysis; No shortness of breath  CARDIOVASCULAR: No chest pain or palpitations  GASTROINTESTINAL: No abdominal or epigastric pain. No nausea, vomiting, or hematemesis; No diarrhea or constipation. No melena or hematochezia.  GENITOURINARY: No dysuria, frequency or hematuria  NEUROLOGICAL: No numbness or weakness  SKIN: No itching, rashes    ICU Vital Signs Last 24 Hrs  T(C): 37.3 (2023 19:00), Max: 37.3 (2023 19:00)  T(F): 99.1 (2023 19:00), Max: 99.1 (2023 19:00)  HR: 77 (2023 20:00) (70 - 88)  BP: 108/58 (2023 10:03) (108/58 - 108/58)  BP(mean): 75 (2023 10:03) (75 - 75)  ABP: 110/62 (2023 20:00) (103/49 - 140/63)  ABP(mean): 88 (2023 20:00) (78 - 109)  RR: 20 (2023 20:00) (16 - 26)  SpO2: 95% (2023 20:00) (94% - 100%)    O2 Parameters below as of 2023 20:00  Patient On (Oxygen Delivery Method): room air      CVP(mm Hg): --  CO: --  CI: --  PA: --  PA(mean): --  PA(direct): --  PCWP: --  LA: --  RA: --  SVR: --  SVRI: --  PVR: --  PVRI: --  I&O's Summary    2023 07:01  -  2023 07:00  --------------------------------------------------------  IN: 868 mL / OUT: 1325 mL / NET: -457 mL    2023 07:01  -  2023 20:20  --------------------------------------------------------  IN: 696 mL / OUT: 850 mL / NET: -154 mL        CAPILLARY BLOOD GLUCOSE    CAPILLARY BLOOD GLUCOSE      POCT Blood Glucose.: 249 mg/dL (2023 16:34)    PHYSICAL EXAM:  GENERAL: No acute distress, well-developed  HEAD:  Atraumatic, Normocephalic  EYES: EOMI, PERRLA, conjunctiva and sclera clear  NECK: Supple, no lymphadenopathy, no JVD  CHEST/LUNG: CTAB; No wheezes, rales, or rhonchi  HEART: Regular rate and rhythm. Normal S1/S2. No murmurs, rubs, or gallops  ABDOMEN: Soft, non-tender, non-distended; normal bowel sounds, no organomegaly  EXTREMITIES:  2+ peripheral pulses b/l, No clubbing, cyanosis, or edema  NEUROLOGY: A&O x 3, no focal deficits  SKIN: No rashes or lesions  ============================I/O===========================   I&O's Detail    2023 07:01  -  2023 07:00  --------------------------------------------------------  IN:    Heparin: 288 mL    Oral Fluid: 580 mL  Total IN: 868 mL    OUT:    Voided (mL): 1325 mL  Total OUT: 1325 mL    Total NET: -457 mL      2023 07:01  -  2023 20:20  --------------------------------------------------------  IN:    Heparin: 156 mL    Oral Fluid: 540 mL  Total IN: 696 mL    OUT:    Voided (mL): 850 mL  Total OUT: 850 mL    Total NET: -154 mL    ============================ LABS =========================                        12.2   13.43 )-----------( 295      ( 2023 01:45 )             36.8         131<L>  |  99  |  32<H>  ----------------------------<  146<H>  4.8   |  16<L>  |  1.62<H>    Ca    9.5      2023 01:45  Phos  3.1       Mg     2.0         TPro  7.8  /  Alb  3.9  /  TBili  0.7  /  DBili  x   /  AST  31  /  ALT  63<H>  /  AlkPhos  65        LIVER FUNCTIONS - ( 2023 01:45 )  Alb: 3.9 g/dL / Pro: 7.8 g/dL / ALK PHOS: 65 U/L / ALT: 63 U/L / AST: 31 U/L / GGT: x           PT/INR - ( 2023 01:45 )   PT: 14.5 sec;   INR: 1.39 ratio         PTT - ( 2023 01:45 )  PTT:69.7 sec    Lactate, Blood: 0.8 mmol/L (23 @ 01:45)  Lactate, Blood: 0.7 mmol/L (23 @ 00:55)    Urinalysis Basic - ( 2023 01:45 )    Color: x / Appearance: x / SG: x / pH: x  Gluc: 146 mg/dL / Ketone: x  / Bili: x / Urobili: x   Blood: x / Protein: x / Nitrite: x   Leuk Esterase: x / RBC: x / WBC x   Sq Epi: x / Non Sq Epi: x / Bacteria: x      ======================Micro/Rad/Cardio=================  Telemtry: Reviewed   EKG: Reviewed  CXR: Reviewed  Culture: Reviewed   Echo:   Cath: Cardiac Cath Lab - Adult:   [facility]  Department of Cardiology  [address] [place] [address]  [phone]  Cath Lab Report -- Comprehensive Report  Patient: LD VALENCIA  Study date: 2017  Account number: [id number]  MR number: [id number]  : 1965  Gender: Male  Race: W  Case Physician(s):  Jairon Holguin M.D.  Referring Physician:  Luc Lynn M.D.  INDICATIONS: Unstable angina - CCS4.  HISTORY: The patient has a history of coronary artery disease. The patient  hashypertension and medication-treated dyslipidemia.  PROCEDURE:  --  Left heart catheterization with ventriculography.  --  Left coronary angiography.  --  Right coronary angiography.  TECHNIQUE: The risks and alternatives of the procedures and conscious  sedation were explained to the patient and informed consent was obtained.  Cardiac catheterization performed electively.  Local anesthetic given. Right radial artery access. A 6FR PRELUDE KIT was  inserted in the vessel. Left heart catheterization. Ventriculography was  performed. A 5FR FR4.0 EXPO catheter was utilized. Left coronary artery  angiography. The vessel was injected utilizing a 5FR FL3.5 EXPO catheter.  Right coronary artery angiography. The vessel was injected utilizing a 5FR  FR4.0 EXPO catheter. RADIATION EXPOSURE: 1.1 min.  CONTRAST GIVEN: Omnipaque 55 ml.  MEDICATIONS GIVEN: Midazolam, 1 mg, IV. Fentanyl, 25 mcg, IV. Verapamil  (Isoptin, Calan, Covera), 2.5 mg, IA. Heparin, 3000 units, IA.  VENTRICLES: Global left ventricular function was moderately depressed. EF  estimated was 40 %.  CORONARY VESSELS: The coronary circulation is right dominant.  LM:   --  LM: Normal.  LAD:   --  Proximal LAD: There was a 50 % stenosis.  CX:   --  Circumflex: Normal.  RCA:   --  Mid RCA: There was a 40 % stenosis.  --  Distal RCA: There was a 50 % stenosis.  COMPLICATIONS: There were no complications.  DIAGNOSTIC RECOMMENDATIONS: The patient should continue with the present  medications.  Prepared and signed by  Jairon Holguin M.D.  Signed 2017 12:20:13  HEMODYNAMIC TABLES  Pressures:  Baseline/ Room Air  Pressures:  - HR: 78  Pressures:  - Rhythm:  Pressures:  -- Aortic Pressure (S/D/M): --/--/99  Pressures:  -- Left Ventricle (s/edp): 157/39/--  Outputs:  Baseline/ Room Air  Outputs:  -- CALCULATIONS: Age in years: 51.41  Outputs:  -- CALCULATIONS: Body Surface Area: 2.05  Outputs:  -- CALCULATIONS: Height in cm: 175.00  Outputs:  -- CALCULATIONS: Sex: Male  Outputs:  -- CALCULATIONS: Weight in k.40 (17 @ 21:55)    ======================================================  PAST MEDICAL & SURGICAL HISTORY:  HTN (hypertension)    CAD (coronary artery disease)  ; stent    Intracranial hemorrhage      Respiratory arrest      Myocardial infarction, unspecified MI type, unspecified artery    History of coronary artery stent placement      Assessment and Plan:   · Assessment    ====================ASSESSMENT =============  59 yo male with HTN, CAD (s/p PCI ), HFrEF, CVA , and T2DM presenting with chest pressure and unknown tachycardia that was shocked x1, [facility]  found to have in-stent restenosis of pLAD and  of RCA with elevated RA and PA pressures and severely decreased EF 32%, admitted to CICU for management of cardiogenic shock requiring IABP  -, with hospital course c/b vfib arrest requiring reinsertion of IABP.     Plan:  ====================== NEUROLOGY=====================  # Anxiety  - Ativan 0.5 mg PO Q 6hrs PRN   - No Seroquel or antipsychotics since vfib arrest and prolonged QTC  - Psych Eval last week, recs for continuing Ativan PRN and had recommended SSRI, but pt. refused     ==================== RESPIRATORY======================  # Acute Hypoxemic Respiratory Failure  - s/p 2 intubations for cardiogenic pulm edema and the in setting of cardiac arrest  - Extubated 11/10  - currently on room air with spO2 mid 90s    # Asthma (hx of respiratory failure in 2018- intubated for 20 minutes per chart review pt report)  - c/w albuterol, symbicort and spiriva  - on trelegy at home    ====================CARDIOVASCULAR==================  # Vfib arrest insetting of ischemia vs. shock  - Lido off since    - PO Amio load dose (s/p IV amio ~1950mg IV gtt) for total of 5 grams per EP to complete   - Keep K > 4, Mag > 2.2     # Cardiogenic shock requiring IABP (- , -)  - likely 2/2 NSTEMI and ADHF  -  LH: pLAD 100 % in-stent restenosis & mRCA, 100 %. PCWP 30. IABP placed.  -  TTE: LV dilated. EF 32 %. Regional WMAs present, mod (grade 2) LV diastolic dysfunction  -  TTE: EF 22% and + LV thrombus   - IABP removed , vfib , IABP later reinserted that day for CS maintained on 1:1   - D/C Milrinone gtt 7:30 am   - Currently on hydralazine 100 TID and ISD 40 TID for AL reduction  - continue coreg, uptitrate as tolerated  - Holding diuretics at this time, PRN Bumex IVP as needed  - Continue romel 25 daily for GDMT  - continue to monitor perfusion indices daily  - AT Eval, F/u HF recs    # NSTEMI- Stent re-occlusion of pLAD and 100%  of RCA  - EKG on admission w/LBBB  - DAPT: c/w ASA, Brilinta d/c'd per transplant w/u  - Cont. lipitor 80  - cMR deferred given necessity of IABP  - CT sx not recommending CABG, undergoing AT eval    # LV thrombus  - c/w heparin gtt    ===================HEMATOLOGIC/ONC ===================  - H/H and platelets stable  - continue to monitor daily  # VTE prophylaxis   - c/w heparin gtt given LV Thrombus and IABP     ===================== RENAL =========================  # Non-oliguric ARIC   - sCr now improving. Baseline Cr: 1-1.22  - Diuresis held, optimize pre-renal factors for optimal perfusion and volume status  - Trend BMP, lytes daily, replace as needed  - Continue Strict I/Os, avoid nephrotoxins    ==================== GASTROINTESTINAL===================  - NPO pending colonoscopy  GERD  - c/w home protonix    Bowel regimen  - Miralax and senna d/c'd iso loose stools- trend quantity and quality of BMs    =======================    ENDOCRINE  =====================  Type 2 DM  - A1c 8.3, sugars uncontrolled on admission likely stress induced s/p cardiac arrest  - insulin gtt transitioned off  AM  - continue lantus 8, premeal 8, low ISS    ========================INFECTIOUS DISEASE================  - Afebrile, continue to monitor off abx  - Concern for aspiration event prior to intubation on admission - s/p vanco and cefepime  - BCx 11/10 NGTD, RVP neg, MRSA neg, UA neg  - Mild fluctuations in WBCs w/o fever and no left shift, continue to monitor    COVID  - Off airborne precautions     Pre-transplant ID w/u   - trend ID recs for serologies   - chest CT    LINES:   RIJ [place] -, RAx Jacklyn , LFA IABP       Patient requires continuous monitoring with bedside rhythm monitoring, pulse ox monitoring, and intermittent blood gas analysis. Care plan discussed with ICU care team. Patient remained critical and at risk for life threatening decompensation.  Patient seen, examined and plan discussed with CCU team during rounds.   I have personally provided __30__ minutes of critical care time excluding time spent on separate procedures, in addition to initial critical care time provided by the CICU Attending, Dr. Durand.    By signing my name below, I, Eleonora Marshall, attest that this documentation has been prepared under the direction and in the presence of Kirsty Davis NP   Electronically signed: Sangita Talavera, 23 @ 20:21  I, Kirsty Davis NP, personally performed the services described in this documentation. all medical record entries made by the sangita were at my direction and in my presence. I have reviewed the chart and agree that the record reflects my personal performance and is accurate and complete  Electronically signed: Kirsty Davis NP

## 2023-11-17 NOTE — PROGRESS NOTE ADULT - SUBJECTIVE AND OBJECTIVE BOX
LD VALENCIA  58y Male  MRN:[id number]    Patient is a 58y old  Male who presents with a chief complaint of NSTEMI (01 Nov 2023 20:29)    HPI:  57yo M w/ hx HTN, CAD w/ 1 stent in 2009, ICH (2008) presenting with abn ekg. Patient presented to [facility] where he was found to have STEMI, recommended to get cath however patient did not want to get it there so it left and came here.  Patient initially had cough, congestion, fever, was placed on antibiotics on Sunday.  Started feeling nauseous and had a presyncopal event after which he presented to ED last night.  Had chest pain as well.  Chest pain is midsternal.  Not currently having chest pain.  Received 4 aspirin 30 min pta. (01 Nov 2023 15:11)      Patient seen and evaluated at bedside in CCU. interval events noted    Interval HPI: remain in ccu. IABP in place     PAST MEDICAL & SURGICAL HISTORY:  HTN (hypertension)      CAD (coronary artery disease)  2009; stent      Intracranial hemorrhage  2008      Respiratory arrest  december 1st      Myocardial infarction, unspecified MI type, unspecified artery      History of coronary artery stent placement          REVIEW OF SYSTEMS:  as per hpi     VITALS:   ICU Vital Signs Last 24 Hrs  T(C): 36.9 (17 Nov 2023 10:03), Max: 36.9 (16 Nov 2023 23:00)  T(F): 98.5 (17 Nov 2023 07:34), Max: 98.5 (16 Nov 2023 23:00)  HR: 79 (17 Nov 2023 11:00) (70 - 89)  BP: 108/58 (17 Nov 2023 10:03) (108/58 - 108/58)  BP(mean): 75 (17 Nov 2023 10:03) (75 - 75)  ABP: 125/58 (17 Nov 2023 11:00) (109/49 - 140/63)  ABP(mean): 93 (17 Nov 2023 11:00) (85 - 109)  RR: 21 (17 Nov 2023 11:00) (16 - 26)  SpO2: 96% (17 Nov 2023 11:00) (94% - 97%)    O2 Parameters below as of 17 Nov 2023 11:00  Patient On (Oxygen Delivery Method): room air            PHYSICAL EXAM:  GENERAL: NAD, comfortable   HEAD:  Atraumatic, Normocephalic  EYES: EOMI, PERRLA, conjunctiva and sclera clear  NECK: Supple, No JVD   CHEST/LUNG: Clear to auscultation bilaterally; No wheeze  HEART: Regular rate and rhythm; No murmurs, rubs, or gallops  ABDOMEN: Soft, Nontender, Nondistended; Bowel sounds present  EXTREMITIES:  2+ Peripheral Pulses, No clubbing, cyanosis, or edema  NEUROLOGY: nonfocal  SKIN: No rashes or lesions  +iabp    Consultant(s) Notes Reviewed:  [x ] YES  [ ] NO  Care Discussed with Consultants/Other Providers [ x] YES  [ ] NO    MEDS:   MEDICATIONS  (STANDING):  aMIOdarone    Tablet 400 milliGRAM(s) Oral every 8 hours  aMIOdarone    Tablet   Oral   aspirin  chewable 81 milliGRAM(s) Oral daily  atorvastatin 80 milliGRAM(s) Oral at bedtime  budesonide  80 MICROgram(s)/formoterol 4.5 MICROgram(s) Inhaler 2 Puff(s) Inhalation two times a day  carvedilol 12.5 milliGRAM(s) Oral every 12 hours  chlorhexidine 2% Cloths 1 Application(s) Topical daily  dextrose 50% Injectable 25 Gram(s) IV Push once  dextrose 50% Injectable 12.5 Gram(s) IV Push once  heparin  Infusion 1600 Unit(s)/Hr (12 mL/Hr) IV Continuous <Continuous>  hydrALAZINE 100 milliGRAM(s) Oral three times a day  insulin glargine Injectable (LANTUS) 8 Unit(s) SubCutaneous at bedtime  insulin lispro (ADMELOG) corrective regimen sliding scale   SubCutaneous three times a day before meals  insulin lispro (ADMELOG) corrective regimen sliding scale   SubCutaneous at bedtime  insulin lispro Injectable (ADMELOG) 8 Unit(s) SubCutaneous three times a day before meals  isosorbide   dinitrate Tablet (ISORDIL) 40 milliGRAM(s) Oral three times a day  lidocaine   4% Patch 1 Patch Transdermal every 24 hours  spironolactone 25 milliGRAM(s) Oral daily    MEDICATIONS  (PRN):  LORazepam     Tablet 0.5 milliGRAM(s) Oral every 6 hours PRN Anxiety        ALLERGIES:  penicillins (Unknown)      LABS:                                     12.2   13.43 )-----------( 295      ( 17 Nov 2023 01:45 )             36.8   11-17    131<L>  |  99  |  32<H>  ----------------------------<  146<H>  4.8   |  16<L>  |  1.62<H>    Ca    9.5      17 Nov 2023 01:45  Phos  3.1     11-17  Mg     2.0     11-17    TPro  7.8  /  Alb  3.9  /  TBili  0.7  /  DBili  x   /  AST  31  /  ALT  63<H>  /  AlkPhos  65  11-17         < from: Xray Chest 1 View- PORTABLE-Urgent (11.01.23 @ 07:42) >    IMPRESSION:  Clear lungs.    ---End of Report ---        < end of copied text >  < from: TTE W or WO Ultrasound Enhancing Agent (11.01.23 @ 10:23) >  _____________________________     CONCLUSIONS:      1. Left ventricular cavity is moderately dilated. Left ventricular wall thickness is normal. Left ventricular systolic function is severely decreased with an ejection fraction of 32 % by Chinchilla's method of disks. Regional wall motion abnormalities present.   2. Multiple segmental abnormalities exist. See findings.   3. There is moderate (grade 2) left ventricular diastolic dysfunction, with indeterminant filling pressure.   4. Normal right ventricular cavity size, wall thickness, and systolic function.   5. No significant valvular disease.   6. No pericardial effusion seen.   7. Compared to the transthoracic echocardiogram performed on 1/25/2017 the areas of akinesis are unchanged but there has been a decline in LV systolic function with new areas of hypokinesis.    __________________________________________________________________    < end of copied text >  < from: TTE Limited W or WO Ultrasound Enhancing Agent (11.02.23 @ 07:41) >  __________________________     CONCLUSIONS:      1. After obtaining consent, Definity ultrasound enhancing agent was given for enhanced left ventricular opacification and improved delineation of the left ventricular endocardial borders. Left ventricular systolic function is severely decreased with a calculated ejection fraction of 22 % by the Chinchilla's biplane method of disks. There is a left ventricular thrombus.   2. Findings were discussed with Litzy BOSS on 11/2/2023 at 8.49am.   3. There is a left ventricular thrombus.    _________________________________________________________________    < end of copied text >

## 2023-11-17 NOTE — PROGRESS NOTE ADULT - PROBLEM SELECTOR PLAN 2
- PMVT into VF arrest 11/8, 3 rounds with shocks  - Transitioned from IV Lidocaine infusion to PO Amio load 11/14  - IABP support as above  - EP following.

## 2023-11-17 NOTE — PROGRESS NOTE ADULT - PROBLEM SELECTOR PLAN 1
- L IABP at 1:1, placed 11/9. On AC with Heparin infusion (also for LV thrombus)  - Currently on Coreg 12.5 mg BID;  hold for MAP <65  - Continue HDZN 100 mg TID and ISDN 40 mg TID, hold for MAP <65  - Continue Spironolactone 25 mg QD  - PRN diuretics to target net even fluid balance  - AT evaluation: launched 11/10. GI on board, s/p bedside colonoscopy today; No masses or polyps found. Cleared from GI standpoint.   - Please Renal given Hx of DM and ARIC. Will also need to re-engage Hepatology for f/u on Elastography results.  - Pending CT chest will Pulm input once resulted.   - Please keep primary Dr. Banuelos [phone] in the loop per family request.

## 2023-11-17 NOTE — PROGRESS NOTE ADULT - PROBLEM SELECTOR PLAN 4
Please call patient, obtain blood sugar log.  What are her insulin doses currently.  Will need to adjust her long-acting insulin dose. - Cr on admission 1.2, peaked to 4  - Improving with above optimization, now .7  - Support as above.

## 2023-11-17 NOTE — PROGRESS NOTE ADULT - SUBJECTIVE AND OBJECTIVE BOX
ADVANCED HEART FAILURE & TRANSPLANT  - PROGRESS NOTE  *To reach the NS2 Team from 8am to 5pm (MON-FRI), please call [phone].   _______________________________________________________________________________________________________    Subjective:  -    Medications:  aMIOdarone    Tablet 400 milliGRAM(s) Oral every 8 hours  aMIOdarone    Tablet   Oral   aspirin  chewable 81 milliGRAM(s) Oral daily  atorvastatin 80 milliGRAM(s) Oral at bedtime  budesonide  80 MICROgram(s)/formoterol 4.5 MICROgram(s) Inhaler 2 Puff(s) Inhalation two times a day  carvedilol 25 milliGRAM(s) Oral every 12 hours  chlorhexidine 2% Cloths 1 Application(s) Topical daily  dextrose 50% Injectable 25 Gram(s) IV Push once  dextrose 50% Injectable 12.5 Gram(s) IV Push once  heparin  Infusion 1600 Unit(s)/Hr IV Continuous <Continuous>  hydrALAZINE 100 milliGRAM(s) Oral three times a day  insulin glargine Injectable (LANTUS) 8 Unit(s) SubCutaneous at bedtime  insulin lispro (ADMELOG) corrective regimen sliding scale   SubCutaneous three times a day before meals  insulin lispro (ADMELOG) corrective regimen sliding scale   SubCutaneous at bedtime  insulin lispro Injectable (ADMELOG) 8 Unit(s) SubCutaneous three times a day before meals  isosorbide   dinitrate Tablet (ISORDIL) 40 milliGRAM(s) Oral three times a day  LORazepam     Tablet 0.5 milliGRAM(s) Oral every 6 hours PRN  spironolactone 25 milliGRAM(s) Oral daily      Physical Exam:    Vitals:  Vital Signs Last 24 Hours  T(C): 36.9 (23 @ 16:00), Max: 36.9 (23 @ 23:00)  HR: 82 (23 @ 16:00) (70 - 89)  BP: 108/58 (23 @ 10:03) (108/58 - 108/58)  RR: 23 (23 @ 16:00) (16 - 26)  SpO2: 97% (23 @ 16:00) (94% - 100%)    Weight in k.5 ( @ 06:00)    I&O's Summary    2023 07:  -  2023 07:00  --------------------------------------------------------  IN: 868 mL / OUT: 1325 mL / NET: -457 mL    2023 07:01  -  2023 16:27  --------------------------------------------------------  IN: 348 mL / OUT: 500 mL / NET: -152 mL    Tele:     General: No distress. Comfortable.  HEENT: EOM intact.  Neck: Neck supple. JVP not elevated. No masses  Chest: Clear to auscultation bilaterally  CV: Normal S1 and S2. No murmurs, rub, or gallops. Radial pulses normal.  Abdomen: Soft, non-distended, non-tender  Skin: No rashes or skin breakdown  Extremities: No LE edema  Neurology: Alert and oriented times three. Sensation intact  Psych: Affect normal    Labs:                        12.2   13.43 )-----------( 295      ( 2023 01:45 )             36.8         131<L>  |  99  |  32<H>  ----------------------------<  146<H>  4.8   |  16<L>  |  1.62<H>    Ca    9.5      2023 01:45  Phos  3.1       Mg     2.0         TPro  7.8  /  Alb  3.9  /  TBili  0.7  /  DBili  x   /  AST  31  /  ALT  63<H>  /  AlkPhos  65    PT/INR - ( 2023 01:45 )   PT: 14.5 sec;   INR: 1.39 ratio    PTT - ( 2023 01:45 )  PTT:69.7 sec    Lactate, Blood: 0.8 mmol/L ( @ 01:45)  Lactate, Blood: 0.7 mmol/L ( @ 00:55) ADVANCED HEART FAILURE & TRANSPLANT  - PROGRESS NOTE  *To reach the NS2 Team from 8am to 5pm (MON-FRI), please call [phone].   _______________________________________________________________________________________________________    Subjective:  - No orthopnea, PND, CP or palpitations    Medications:  aMIOdarone    Tablet 400 milliGRAM(s) Oral every 8 hours  aMIOdarone    Tablet   Oral   aspirin  chewable 81 milliGRAM(s) Oral daily  atorvastatin 80 milliGRAM(s) Oral at bedtime  budesonide  80 MICROgram(s)/formoterol 4.5 MICROgram(s) Inhaler 2 Puff(s) Inhalation two times a day  carvedilol 25 milliGRAM(s) Oral every 12 hours  chlorhexidine 2% Cloths 1 Application(s) Topical daily  dextrose 50% Injectable 25 Gram(s) IV Push once  dextrose 50% Injectable 12.5 Gram(s) IV Push once  heparin  Infusion 1600 Unit(s)/Hr IV Continuous <Continuous>  hydrALAZINE 100 milliGRAM(s) Oral three times a day  insulin glargine Injectable (LANTUS) 8 Unit(s) SubCutaneous at bedtime  insulin lispro (ADMELOG) corrective regimen sliding scale   SubCutaneous three times a day before meals  insulin lispro (ADMELOG) corrective regimen sliding scale   SubCutaneous at bedtime  insulin lispro Injectable (ADMELOG) 8 Unit(s) SubCutaneous three times a day before meals  isosorbide   dinitrate Tablet (ISORDIL) 40 milliGRAM(s) Oral three times a day  LORazepam     Tablet 0.5 milliGRAM(s) Oral every 6 hours PRN  spironolactone 25 milliGRAM(s) Oral daily      Physical Exam:    Vitals:  Vital Signs Last 24 Hours  T(C): 36.9 (23 @ 16:00), Max: 36.9 (23 @ 23:00)  HR: 82 (23 @ 16:00) (70 - 89)  BP: 108/58 (23 @ 10:03) (108/58 - 108/58)  RR: 23 (23 @ 16:00) (16 - 26)  SpO2: 97% (23 @ 16:00) (94% - 100%)    Weight in k.5 ( @ 06:00)    I&O's Summary    2023 07:  -  2023 07:00  --------------------------------------------------------  IN: 868 mL / OUT: 1325 mL / NET: -457 mL    2023 07:01  -  2023 16:27  --------------------------------------------------------  IN: 348 mL / OUT: 500 mL / NET: -152 mL    Tele: SR 90s    General: No distress. Comfortable.  HEENT: EOM intact.  Neck: Neck supple. JVP 6 cm H2O no HJR  Chest: Clear to auscultation bilaterally  CV: Normal S1 and S2. No murmurs, rub, or gallops. +2 DP pulse  Abdomen: Soft, non-distended, non-tender  Extremities: Warm peripherally. No edema   Skin: IABP in place   Neurology: Alert and oriented times three. Sensation intact  Psych: Affect normal      Labs:                        12.2   13.43 )-----------( 295      ( 2023 01:45 )             36.8         131<L>  |  99  |  32<H>  ----------------------------<  146<H>  4.8   |  16<L>  |  1.62<H>    Ca    9.5      2023 01:45  Phos  3.1       Mg     2.0         TPro  7.8  /  Alb  3.9  /  TBili  0.7  /  DBili  x   /  AST  31  /  ALT  63<H>  /  AlkPhos  65    PT/INR - ( 2023 01:45 )   PT: 14.5 sec;   INR: 1.39 ratio    PTT - ( 2023 01:45 )  PTT:69.7 sec    Lactate, Blood: 0.8 mmol/L ( @ 01:45)  Lactate, Blood: 0.7 mmol/L ( @ 00:55)

## 2023-11-17 NOTE — PROGRESS NOTE ADULT - CRITICAL CARE ATTENDING COMMENT
History of anxiety disorder and CAD s/p PCI  Admitted with cardiogenic shock and respiratory failure in the setting of stent restenosis  Transferred out and had VT/VF cardiac arrest  Intubated during arrest, ROSC achieved, readmitted to CICU  A+Ox3, anxious - continue Ativan 0.5 q6h  Cardiogenic shock on IABP, off milrinone   Maintain IABP and afterload reduction  as d/w CHF team cont coreg, would hold off increasing dose immediately post scope today given post anaesthesia but if remains hemodynamically stable would consider     VT/VF arrest, on Amiodarone PO    Continue with ASA  SpO2 mid 90s on RA  npo for colonoscopy   Non-oliguric ARIC that is improving, likely ATN from arrest  H/H low but acceptable on Heparin drip for LV thrombus  Afebrile, cultures no growth, no antibiotics   Sugars controlled on Lantus  LFA IABP, rangel 11/9.

## 2023-11-17 NOTE — PROGRESS NOTE ADULT - ASSESSMENT
57 yo M with HFrEF (LVIDd 6.4 cm, LVEF 32%), CAD s/p PCI (2008), HTN, DMT2 (A1c 8.3%) and CVA s/p TPA (2018), recently treated for PNA who initially presented to [facility] via EMS after syncope reportedly requiring defibrilation. Treated for ACS but left AMA to come to [facility]. On arrival ECG with ST depression in lateral leads. Intubated 11/1 for respiratory failure. Found to have COVID. L/RHC 11/1revealing  of LAD and RCA, elevated filling pressures and CI of 1.5 prompting placement of IABP. Extubated 11/3. IABP was weaned to off 11/7, then the following day on 11/8, developed PMVT cardiac arrest with prompt CPR and defibrillation, started on IV Lidocaine and Amio. IABP ultimately replaced on 11/9 for worsening hypotension. TTE 11/11 revealing LV thrombus.     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 AT evaluation launched 11/10, he is ABO A. Currently well supported on IABP at 1:1 without further arrhythmias.      Cardiac Studies  11/1123 TTE: LVEF 22% (global), LV thrombus, normal RV size and function, mild MR, trace TR  11/1/23  LHC showed pLAD 70 % stenosis in the ostium portion of the segment and 100 % in-stent restenosis and in mRCA, 100 % stenosis.   11/1/23 RHC; RA 23 Vw 24, PA 52/33/40, PCWP 30 Vw 33, AO 85/66/73, PA sat 54.4%, CO/CI (F) 2.96/1.44, IABP was placed during the cath through R common femoral artery.   11/1/23 TTE: LVIDd 6.4cm , LVEF 32%, regional WMA, grade II DD,  TAPSE 1.7cm, mld MR, trace TR,     Bedside hemodynamics  11/3: IABP 1:1 RA 5; PA 27/12/18; CO/CI 5.6/2.6; MAP 90-100s.  11/4/23 IABP 1:3 CVP 4 PA 37/18 SvO2 83.8 CO/CI 11.2 CI 5.1  (in the setting of fever to 38.3C)  11/5 IABP 1:1 CVP 3 PA 48/27 SvO2 78.4% CO 8.2 CI 3.7   11/1 (IABP 1:1): CVP 1, PA 33/5/16, MvO2 74.3%

## 2023-11-17 NOTE — PROGRESS NOTE ADULT - SUBJECTIVE AND OBJECTIVE BOX
INFECTIOUS DISEASES FOLLOW UP-- Courtney Peters  [phone]    This is a follow up note for this  58yMale with  Non-ST elevation myocardial infarction (NSTEMI)        ROS:  CONSTITUTIONAL:  No fever, good appetite  CARDIOVASCULAR:  No chest pain or palpitations  RESPIRATORY:  No dyspnea  GASTROINTESTINAL:  No nausea, vomiting, diarrhea, or abdominal pain  GENITOURINARY:  No dysuria  NEUROLOGIC:  No headache,     Allergies    penicillins (Unknown)    Intolerances        ANTIBIOTICS/RELEVANT:  antimicrobials    immunologic:    OTHER:  aMIOdarone    Tablet 400 milliGRAM(s) Oral every 8 hours  aMIOdarone    Tablet   Oral   aspirin  chewable 81 milliGRAM(s) Oral daily  atorvastatin 80 milliGRAM(s) Oral at bedtime  budesonide  80 MICROgram(s)/formoterol 4.5 MICROgram(s) Inhaler 2 Puff(s) Inhalation two times a day  carvedilol 25 milliGRAM(s) Oral every 12 hours  chlorhexidine 2% Cloths 1 Application(s) Topical daily  dextrose 50% Injectable 25 Gram(s) IV Push once  dextrose 50% Injectable 12.5 Gram(s) IV Push once  heparin  Infusion 1600 Unit(s)/Hr IV Continuous <Continuous>  hydrALAZINE 100 milliGRAM(s) Oral three times a day  insulin glargine Injectable (LANTUS) 8 Unit(s) SubCutaneous at bedtime  insulin lispro (ADMELOG) corrective regimen sliding scale   SubCutaneous three times a day before meals  insulin lispro (ADMELOG) corrective regimen sliding scale   SubCutaneous at bedtime  insulin lispro Injectable (ADMELOG) 8 Unit(s) SubCutaneous three times a day before meals  isosorbide   dinitrate Tablet (ISORDIL) 40 milliGRAM(s) Oral three times a day  LORazepam     Tablet 0.5 milliGRAM(s) Oral every 6 hours PRN  spironolactone 25 milliGRAM(s) Oral daily      Objective:  Vital Signs Last 24 Hrs  T(C): 36.9 (17 Nov 2023 16:00), Max: 36.9 (16 Nov 2023 23:00)  T(F): 98.4 (17 Nov 2023 16:00), Max: 98.5 (16 Nov 2023 23:00)  HR: 85 (17 Nov 2023 17:00) (70 - 89)  BP: 108/58 (17 Nov 2023 10:03) (108/58 - 108/58)  BP(mean): 75 (17 Nov 2023 10:03) (75 - 75)  RR: 19 (17 Nov 2023 17:00) (16 - 26)  SpO2: 95% (17 Nov 2023 17:00) (94% - 100%)    Parameters below as of 17 Nov 2023 17:00  Patient On (Oxygen Delivery Method): room air        PHYSICAL EXAM:  Constitutional:no acute distress  Eyes:MARVEL, EOMI  Ear/Nose/Throat: no oral lesions, 	  Respiratory: clear BL  Cardiovascular: S1S2  Gastrointestinal:soft, (+) BS, no tenderness  Extremities:no e/e/c  No Lymphadenopathy  IV sites not inflammed.    LABS:                        12.2   13.43 )-----------( 295      ( 17 Nov 2023 01:45 )             36.8     11-17    131<L>  |  99  |  32<H>  ----------------------------<  146<H>  4.8   |  16<L>  |  1.62<H>    Ca    9.5      17 Nov 2023 01:45  Phos  3.1     11-17  Mg     2.0     11-17    TPro  7.8  /  Alb  3.9  /  TBili  0.7  /  DBili  x   /  AST  31  /  ALT  63<H>  /  AlkPhos  65  11-17    PT/INR - ( 17 Nov 2023 01:45 )   PT: 14.5 sec;   INR: 1.39 ratio         PTT - ( 17 Nov 2023 01:45 )  PTT:69.7 sec  Urinalysis Basic - ( 17 Nov 2023 01:45 )    Color: x / Appearance: x / SG: x / pH: x  Gluc: 146 mg/dL / Ketone: x  / Bili: x / Urobili: x   Blood: x / Protein: x / Nitrite: x   Leuk Esterase: x / RBC: x / WBC x   Sq Epi: x / Non Sq Epi: x / Bacteria: x        MICROBIOLOGY:            RECENT CULTURES:      RADIOLOGY & ADDITIONAL STUDIES:    < from: US Elastography Jerson (11.15.23 @ 11:43) >  IMPRESSION:  Minimal risk of clinically significant fibrosis.    < end of copied text >   INFECTIOUS DISEASES FOLLOW UP-- Courtney Peters  [phone]    This is a follow up note for this  58yMale with  Non-ST elevation myocardial infarction (NSTEMI)  leukocytosis to 13k but otherwise feeling well  Santiago CUEVA removed        ROS:  CONSTITUTIONAL:  No fever, good appetite  CARDIOVASCULAR:  No chest pain or palpitations  RESPIRATORY:  No dyspnea  GASTROINTESTINAL:  No nausea, vomiting, diarrhea, or abdominal pain  GENITOURINARY:  No dysuria  NEUROLOGIC:  No headache,     Allergies    penicillins (Unknown)    Intolerances        ANTIBIOTICS/RELEVANT:  antimicrobials    immunologic:    OTHER:  aMIOdarone    Tablet 400 milliGRAM(s) Oral every 8 hours  aMIOdarone    Tablet   Oral   aspirin  chewable 81 milliGRAM(s) Oral daily  atorvastatin 80 milliGRAM(s) Oral at bedtime  budesonide  80 MICROgram(s)/formoterol 4.5 MICROgram(s) Inhaler 2 Puff(s) Inhalation two times a day  carvedilol 25 milliGRAM(s) Oral every 12 hours  chlorhexidine 2% Cloths 1 Application(s) Topical daily  dextrose 50% Injectable 25 Gram(s) IV Push once  dextrose 50% Injectable 12.5 Gram(s) IV Push once  heparin  Infusion 1600 Unit(s)/Hr IV Continuous <Continuous>  hydrALAZINE 100 milliGRAM(s) Oral three times a day  insulin glargine Injectable (LANTUS) 8 Unit(s) SubCutaneous at bedtime  insulin lispro (ADMELOG) corrective regimen sliding scale   SubCutaneous three times a day before meals  insulin lispro (ADMELOG) corrective regimen sliding scale   SubCutaneous at bedtime  insulin lispro Injectable (ADMELOG) 8 Unit(s) SubCutaneous three times a day before meals  isosorbide   dinitrate Tablet (ISORDIL) 40 milliGRAM(s) Oral three times a day  LORazepam     Tablet 0.5 milliGRAM(s) Oral every 6 hours PRN  spironolactone 25 milliGRAM(s) Oral daily      Objective:  Vital Signs Last 24 Hrs  T(C): 36.9 (17 Nov 2023 16:00), Max: 36.9 (16 Nov 2023 23:00)  T(F): 98.4 (17 Nov 2023 16:00), Max: 98.5 (16 Nov 2023 23:00)  HR: 85 (17 Nov 2023 17:00) (70 - 89)  BP: 108/58 (17 Nov 2023 10:03) (108/58 - 108/58)  BP(mean): 75 (17 Nov 2023 10:03) (75 - 75)  RR: 19 (17 Nov 2023 17:00) (16 - 26)  SpO2: 95% (17 Nov 2023 17:00) (94% - 100%)    Parameters below as of 17 Nov 2023 17:00  Patient On (Oxygen Delivery Method): room air        PHYSICAL EXAM:  Constitutional:no acute distress  Eyes:MARVEL, EOMI  Ear/Nose/Throat: no oral lesions, 	  Respiratory: clear BL  Cardiovascular: S1S2  Gastrointestinal:soft, (+) BS, no tenderness  Extremities:no e/e/c  left femoral IABP site CDI  No Lymphadenopathy  IV sites not inflammed.    LABS:                        12.2   13.43 )-----------( 295      ( 17 Nov 2023 01:45 )             36.8     11-17    131<L>  |  99  |  32<H>  ----------------------------<  146<H>  4.8   |  16<L>  |  1.62<H>    Ca    9.5      17 Nov 2023 01:45  Phos  3.1     11-17  Mg     2.0     11-17    TPro  7.8  /  Alb  3.9  /  TBili  0.7  /  DBili  x   /  AST  31  /  ALT  63<H>  /  AlkPhos  65  11-17    PT/INR - ( 17 Nov 2023 01:45 )   PT: 14.5 sec;   INR: 1.39 ratio         PTT - ( 17 Nov 2023 01:45 )  PTT:69.7 sec  Urinalysis Basic - ( 17 Nov 2023 01:45 )    Color: x / Appearance: x / SG: x / pH: x  Gluc: 146 mg/dL / Ketone: x  / Bili: x / Urobili: x   Blood: x / Protein: x / Nitrite: x   Leuk Esterase: x / RBC: x / WBC x   Sq Epi: x / Non Sq Epi: x / Bacteria: x        MICROBIOLOGY:            RECENT CULTURES:      RADIOLOGY & ADDITIONAL STUDIES:    < from: US Elastography Jerson (11.15.23 @ 11:43) >  IMPRESSION:  Minimal risk of clinically significant fibrosis.    < end of copied text >

## 2023-11-17 NOTE — PROGRESS NOTE ADULT - ASSESSMENT
57 yo male h/o htn, cad s/p pci, ICH, here with NSTEMI  s/p intubation and cath. now in CCU    NSTEMI  s/p  cath  cath results noted. multi vessel dz., CTSx f/u.   hep gtt  pt with vtach/fib arrest again on 11/8. s/p ACLS and ROSC.   now extubated  IABP in place  mngt as per CCU  plan for transplant vs LVAD as per HF and transplant team  transplant w/u ongoing. plan for screening colonoscopy as per gi today    LV thrombus   hep gtt    acute on chronic systolic heart failure  heart failure team f/u      pna  recently diagnosed outpt  iv abx now stopped  f/u cult   id following     covid  supportive care    h/o ICH  resolved    agitation  psy consult f/u    mngt as per CCU team  d/w CCU attn         Advanced care planning was discussed with patient and family.  Advanced care planning forms were reviewed and discussed as appropriate.  Differential diagnosis and plan of care discussed with patient after the evaluation.   Pain assessed and judicious use of narcotics when appropriate was discussed.  Importance of Fall prevention discussed.  Counseling on Smoking and Alcohol cessation was offered when appropriate.  Counseling on Diet, exercise, and medication compliance was done.       Approx 75 minutes spent.

## 2023-11-17 NOTE — CHART NOTE - NSCHARTNOTEFT_GEN_A_CORE
US Elastography demonstrates minimal risk of clinically significant fibrosis.   Please obtain Abd US/doppler to assess patency of hepatic and portal veins as requested on 11/13.  If doppler is negative, there is no hepatic contraindication or further evaluation needed for advanced heart failure therapy.     Juan Francisco Braswell MD  Gastroenterology/Hepatology Fellow

## 2023-11-17 NOTE — PROGRESS NOTE ADULT - NS ATTEND AMEND GEN_ALL_CORE FT
Patient found in bed NAD, he remains on 1:1 IABP support, no pressors or inotrope. He is tolerating escalation of GDMT; MAP remains >90. He was discussed during selection meeting and was deemed a good candidate for listing pending colonoscopy and repeat CT chest. He also needs final clearance from hepatology and psychiatry follow up. He remains euvolemic on exam; off diuretics.    Colonoscopy done: No polyps or masses / no objection for transplant listing from GI standpoint     Continue IABP support   Increase carvedilol to 25 mg BID   Continue hydralazine 100/ISDN 40 mg TID   Spironolactone 25 mg daily   Monitor WBC/procalcitonin/ ID follow up   Abd Doppler per hepatology recommended   Please obtain nephrology consult for evaluation of ARIC given history of DM  CT chest pending   Discussed with patient the need to use less benzo /psych follow up for alternative management if meds needed for anxiety   Will consider listed once the above completed

## 2023-11-17 NOTE — PROGRESS NOTE ADULT - SUBJECTIVE AND OBJECTIVE BOX
PATIENT:  DEVORAH VALENCIA  [id number]    CHIEF COMPLAINT:  Patient is a 58y old  Male who presents with a chief complaint of advanced cardiac therapies work up (2023 23:08)      INTERVAL HISTORY/OVERNIGHT EVENTS:      REVIEW OF SYSTEMS:  Constitutional:     [ ] negative [ ] fevers [ ] chills [ ] weight loss [ ] weight gain  HEENT:                  [ ] negative [ ] dry eyes [ ] eye irritation [ ] postnasal drip [ ] nasal congestion  CV:                         [ ] negative  [ ] chest pain [ ] orthopnea [ ] palpitations [ ] murmur  Resp:                     [ ] negative [ ] cough [ ] shortness of breath [ ] dyspnea [ ] wheezing [ ] sputum [ ] hemoptysis  GI:                          [ ] negative [ ] nausea [ ] vomiting [ ] diarrhea [ ] constipation [ ] abd pain [ ] dysphagia   :                        [ ] negative [ ] dysuria [ ] nocturia [ ] hematuria [ ] increased urinary frequency  Musculoskeletal: [ ] negative [ ] back pain [ ] myalgias [ ] arthralgias [ ] fracture  Skin:                       [ ] negative [ ] rash [ ] itch  Neurological:        [ ] negative [ ] headache [ ] dizziness [ ] syncope [ ] weakness [ ] numbness  Psychiatric:           [ ] negative [ ] anxiety [ ] depression  Endocrine:            [ ] negative [ ] diabetes [ ] thyroid problem  Heme/Lymph:      [ ] negative [ ] anemia [ ] bleeding problem  Allergic/Immune: [ ] negative [ ] itchy eyes [ ] nasal discharge [ ] hives [ ] angioedema    [ ] All other systems negative  [ ] Unable to assess ROS because ________.    MEDICATIONS:  MEDICATIONS  (STANDING):  aMIOdarone    Tablet 400 milliGRAM(s) Oral every 8 hours  aMIOdarone    Tablet   Oral   aspirin  chewable 81 milliGRAM(s) Oral daily  atorvastatin 80 milliGRAM(s) Oral at bedtime  budesonide  80 MICROgram(s)/formoterol 4.5 MICROgram(s) Inhaler 2 Puff(s) Inhalation two times a day  carvedilol 12.5 milliGRAM(s) Oral every 12 hours  chlorhexidine 2% Cloths 1 Application(s) Topical daily  dextrose 50% Injectable 25 Gram(s) IV Push once  dextrose 50% Injectable 12.5 Gram(s) IV Push once  heparin  Infusion 1600 Unit(s)/Hr (12 mL/Hr) IV Continuous <Continuous>  hydrALAZINE 100 milliGRAM(s) Oral three times a day  insulin glargine Injectable (LANTUS) 8 Unit(s) SubCutaneous at bedtime  insulin lispro (ADMELOG) corrective regimen sliding scale   SubCutaneous at bedtime  insulin lispro (ADMELOG) corrective regimen sliding scale   SubCutaneous three times a day before meals  insulin lispro Injectable (ADMELOG) 8 Unit(s) SubCutaneous three times a day before meals  isosorbide   dinitrate Tablet (ISORDIL) 40 milliGRAM(s) Oral three times a day  lidocaine   4% Patch 1 Patch Transdermal every 24 hours  spironolactone 25 milliGRAM(s) Oral daily    MEDICATIONS  (PRN):  LORazepam     Tablet 0.5 milliGRAM(s) Oral every 6 hours PRN Anxiety      ALLERGIES:  Allergies    penicillins (Unknown)    Intolerances        OBJECTIVE:  ICU Vital Signs Last 24 Hrs  T(C): 36.7 (2023 04:00), Max: 36.9 (2023 23:00)  T(F): 98.1 (2023 04:00), Max: 98.5 (2023 23:00)  HR: 88 (2023 06:00) (70 - 89)  BP: --  BP(mean): --  ABP: 123/74 (2023 06:00) (109/49 - 140/63)  ABP(mean): 101 (2023 06:00) (85 - 109)  RR: 20 (2023 06:00) (16 - 28)  SpO2: 94% (2023 06:00) (94% - 97%)    O2 Parameters below as of 2023 20:19  Patient On (Oxygen Delivery Method): room air      POCT Blood Glucose.: 181 mg/dL (2023 21:31)  POCT Blood Glucose.: 139 mg/dL (2023 16:53)  POCT Blood Glucose.: 203 mg/dL (2023 12:09)  POCT Blood Glucose.: 150 mg/dL (2023 08:36)    CAPILLARY BLOOD GLUCOSE      POCT Blood Glucose.: 181 mg/dL (2023 21:31)    I&O's Summary    15 Nov 2023 07:01  -  2023 07:00  --------------------------------------------------------  IN: 1158 mL / OUT: 5 mL / NET: -867 mL    2023 07:01  -  2023 06:54  --------------------------------------------------------  IN: 868 mL / OUT: 1325 mL / NET: -457 mL      Daily     Daily Weight in k.5 (2023 06:00)    PHYSICAL EXAMINATION:  General: WN/WD NAD  HEENT: PERRLA, EOMI, moist mucous membranes  Neurology: A&Ox3, nonfocal, MOISE x 4  Respiratory: CTA B/L, normal respiratory effort, no wheezes, crackles, rales  CV: RRR, S1S2, no murmurs, rubs or gallops  Abdominal: Soft, NT, ND +BS, Last BM  Extremities: No edema, + peripheral pulses  Incisions:   Tubes:    LABS:                          12.2   13.43 )-----------( 295      ( 2023 01:45 )             36.8         131<L>  |  99  |  32<H>  ----------------------------<  146<H>  4.8   |  16<L>  |  1.62<H>    Ca    9.5      2023 01:45  Phos  3.1       Mg     2.0         TPro  7.8  /  Alb  3.9  /  TBili  0.7  /  DBili  x   /  AST  31  /  ALT  63<H>  /  AlkPhos  65      LIVER FUNCTIONS - ( 2023 01:45 )  Alb: 3.9 g/dL / Pro: 7.8 g/dL / ALK PHOS: 65 U/L / ALT: 63 U/L / AST: 31 U/L / GGT: x           PT/INR - ( 2023 01:45 )   PT: 14.5 sec;   INR: 1.39 ratio         PTT - ( 2023 01:45 )  PTT:69.7 sec        Urinalysis Basic - ( 2023 01:45 )    Color: x / Appearance: x / SG: x / pH: x  Gluc: 146 mg/dL / Ketone: x  / Bili: x / Urobili: x   Blood: x / Protein: x / Nitrite: x   Leuk Esterase: x / RBC: x / WBC x   Sq Epi: x / Non Sq Epi: x / Bacteria: x        TELEMETRY:     EKG:     IMAGING:      / PATIENT:  DEVORAH VALENCIA  [id number]    CHIEF COMPLAINT:  Patient is a 58y old  Male who presents with a chief complaint of advanced cardiac therapies work up (2023 23:08)      INTERVAL HISTORY/OVERNIGHT EVENTS: no acute overnight events      REVIEW OF SYSTEMS:  Constitutional:     [ X] negative [ ] fevers [ ] chills [ ] weight loss [ ] weight gain  HEENT:                  [X ] negative [ ] dry eyes [ ] eye irritation [ ] postnasal drip [ ] nasal congestion  CV:                         [X ] negative  [ ] chest pain [ ] orthopnea [ ] palpitations [ ] murmur  Resp:                     [X ] negative [ ] cough [ ] shortness of breath [ ] dyspnea [ ] wheezing [ ] sputum [ ] hemoptysis  GI:                          [X ] negative [ ] nausea [ ] vomiting [ ] diarrhea [ ] constipation [ ] abd pain [ ] dysphagia   :                        [X ] negative [ ] dysuria [ ] nocturia [ ] hematuria [ ] increased urinary frequency  Musculoskeletal: [X ] negative [ ] back pain [ ] myalgias [ ] arthralgias [ ] fracture  Skin:                       [X ] negative [ ] rash [ ] itch  Neurological:        [ X] negative [ ] headache [ ] dizziness [ ] syncope [ ] weakness [ ] numbness    [ ] All other systems negative  [ ] Unable to assess ROS because ________.    MEDICATIONS:  MEDICATIONS  (STANDING):  aMIOdarone    Tablet 400 milliGRAM(s) Oral every 8 hours  aMIOdarone    Tablet   Oral   aspirin  chewable 81 milliGRAM(s) Oral daily  atorvastatin 80 milliGRAM(s) Oral at bedtime  budesonide  80 MICROgram(s)/formoterol 4.5 MICROgram(s) Inhaler 2 Puff(s) Inhalation two times a day  carvedilol 12.5 milliGRAM(s) Oral every 12 hours  chlorhexidine 2% Cloths 1 Application(s) Topical daily  dextrose 50% Injectable 25 Gram(s) IV Push once  dextrose 50% Injectable 12.5 Gram(s) IV Push once  heparin  Infusion 1600 Unit(s)/Hr (12 mL/Hr) IV Continuous <Continuous>  hydrALAZINE 100 milliGRAM(s) Oral three times a day  insulin glargine Injectable (LANTUS) 8 Unit(s) SubCutaneous at bedtime  insulin lispro (ADMELOG) corrective regimen sliding scale   SubCutaneous at bedtime  insulin lispro (ADMELOG) corrective regimen sliding scale   SubCutaneous three times a day before meals  insulin lispro Injectable (ADMELOG) 8 Unit(s) SubCutaneous three times a day before meals  isosorbide   dinitrate Tablet (ISORDIL) 40 milliGRAM(s) Oral three times a day  lidocaine   4% Patch 1 Patch Transdermal every 24 hours  spironolactone 25 milliGRAM(s) Oral daily    MEDICATIONS  (PRN):  LORazepam     Tablet 0.5 milliGRAM(s) Oral every 6 hours PRN Anxiety      ALLERGIES:  Allergies    penicillins (Unknown)    Intolerances        OBJECTIVE:  ICU Vital Signs Last 24 Hrs  T(C): 36.7 (2023 04:00), Max: 36.9 (2023 23:00)  T(F): 98.1 (2023 04:00), Max: 98.5 (2023 23:00)  HR: 88 (2023 06:00) (70 - 89)  BP: --  BP(mean): --  ABP: 123/74 (2023 06:00) (109/49 - 140/63)  ABP(mean): 101 (2023 06:00) (85 - 109)  RR: 20 (2023 06:00) (16 - 28)  SpO2: 94% (2023 06:00) (94% - 97%)    O2 Parameters below as of 2023 20:19  Patient On (Oxygen Delivery Method): room air      POCT Blood Glucose.: 181 mg/dL (2023 21:31)  POCT Blood Glucose.: 139 mg/dL (2023 16:53)  POCT Blood Glucose.: 203 mg/dL (2023 12:09)  POCT Blood Glucose.: 150 mg/dL (2023 08:36)    CAPILLARY BLOOD GLUCOSE      POCT Blood Glucose.: 181 mg/dL (2023 21:31)    I&O's Summary    15 Nov 2023 07:01  -  2023 07:00  --------------------------------------------------------  IN: 1158 mL / OUT: 2025 mL / NET: -867 mL    2023 07:01  -  2023 06:54  --------------------------------------------------------  IN: 868 mL / OUT: 1325 mL / NET: -457 mL      Daily     Daily Weight in k.5 (2023 06:00)    PHYSICAL EXAMINATION:  General: WN/WD NAD  HEENT: PERRLA, EOMI, moist mucous membranes  Neurology: A&Ox3, nonfocal, MOISE x 4  Respiratory: CTA B/L, normal respiratory effort, no wheezes, crackles, rales  CV: RRR, S1S2, no murmurs, rubs or gallops  Abdominal: Soft, NT, ND +BS  Extremities: No edema, + palpable peripheral pulses  Skin: warm and dry  lines: L fem IABP dressing clean, dry and intact    LABS:                          12.2   13.43 )-----------( 295      ( 2023 01:45 )             36.8     11-17    131<L>  |  99  |  32<H>  ----------------------------<  146<H>  4.8   |  16<L>  |  1.62<H>    Ca    9.5      2023 01:45  Phos  3.1     -  Mg     2.0     -    TPro  7.8  /  Alb  3.9  /  TBili  0.7  /  DBili  x   /  AST  31  /  ALT  63<H>  /  AlkPhos  65  11-17    LIVER FUNCTIONS - ( 2023 01:45 )  Alb: 3.9 g/dL / Pro: 7.8 g/dL / ALK PHOS: 65 U/L / ALT: 63 U/L / AST: 31 U/L / GGT: x           PT/INR - ( 2023 01:45 )   PT: 14.5 sec;   INR: 1.39 ratio         PTT - ( 2023 01:45 )  PTT:69.7 sec        Urinalysis Basic - ( 2023 01:45 )    Color: x / Appearance: x / SG: x / pH: x  Gluc: 146 mg/dL / Ketone: x  / Bili: x / Urobili: x   Blood: x / Protein: x / Nitrite: x   Leuk Esterase: x / RBC: x / WBC x   Sq Epi: x / Non Sq Epi: x / Bacteria: x        TELEMETRY:     EKG:     IMAGING:      /

## 2023-11-17 NOTE — PROGRESS NOTE ADULT - ASSESSMENT
59 yo M with PMHx of HTN, CAD w/ 1 stent in 2009, ICH (2008) presented on 11/1 with abn ekg. Patient presented to [facility] where he was found to have STEMI, recommended to get cath however patient did not want to get it there so he left and came here.   EKG here with ST depression in lateral leads and elevation in anterior leads   Prior to C found to be tachycardic, dyspneic, intubated   C 11/1 with chronic total occlusion of LAD and RCA, with elevated RA and PA pressures  TTE 11/1 with severely decreased EF 32%, s/p IABP 11/1    RVP (11/1) Positive for COVID19  RVP (11/10) Negative  BCx (11/2, 11/4) NGTD  ETT Culture (11/2) NGTD  MRSA/MSSA PCR (11/2, 11/10) Negative  UA (11/10) 1 WBC    CXR (11/10) Clear Lungs  TTE (11/2) Left ventricular thrombus.    Antibiotic Course:  Vancomycin: 11/2  Cefepime: 11/2    #Pre-Heart Transplant Evaluation  --ID Clearance for OHT pending quantiferon gold, syphilis screen and preliminary blood cultures   --Recommend COVID19 Nucleocapsid Antibody  --Recommend COVID19 [place] Antibody  --Recommend HAV IgG  --Recommend HBVs Ab, HBVsAg, HBVc Ab  --Recommend HCV Ab  --Recommend HSV 1/2 IgG  --Recommend EBV Serology  --Recommend CMV IgG  --Recommend VZV IgG  --Recommend Measles, Mumps and Rubella IgG serologies  --Recommend Quantiferon Gold  --Recommend HIV Test  --Recommend Syphilis Screen  --Recommend Toxoplasma IgG  --Recommend Strongyloides Ab  --Recommend Trypanosoma cruzii serology    #Fever, Leukocytosis improved  Favor noninfectious etiology of fever such as LV thrombus  Elevated procalcitonin noted but this is in context of ARIC  --Monitor off of antibiotics  --If clinical status changes would start Vancomycin + Cefepime  --Follow up on preliminary blood cultures are negative to date    #COVID19  RVP (11/1) Positive for COVID19  RVP (11/10) Negative  Did not receive therapy for COVID19  Low suspicion that hypoxia currently is secondary to COVID19 (favor cardiogenic causes)  --Complete isolation per infection control protocol        Chao Peters MD  Can be called via Teams  After 5pm/weekends [phone]   57 yo M with PMHx of HTN, CAD w/ 1 stent in 2009, ICH (2008) presented on 11/1 with abn ekg. Patient presented to [facility] where he was found to have STEMI, recommended to get cath however patient did not want to get it there so he left and came here.   EKG here with ST depression in lateral leads and elevation in anterior leads   Prior to C found to be tachycardic, dyspneic, intubated   C 11/1 with chronic total occlusion of LAD and RCA, with elevated RA and PA pressures  TTE 11/1 with severely decreased EF 32%, s/p IABP 11/1    RVP (11/1) Positive for COVID19  RVP (11/10) Negative  BCx (11/2, 11/4) NGTD  ETT Culture (11/2) NGTD  MRSA/MSSA PCR (11/2, 11/10) Negative  UA (11/10) 1 WBC    CXR (11/10) Clear Lungs  TTE (11/2) Left ventricular thrombus.    Antibiotic Course:  Vancomycin: 11/2  Cefepime: 11/2    #Pre-Heart Transplant Evaluation Serologies    --Negative HAV IgG would benefit from HAv vaccine series  --Negative HBVs Ab, HBVsAg, HBVc Ab would benefit from heplisav series  --Negative HCV Ab  --HSV 1+/2 - IgG  -- EBV Serology past exposure  --CMV IgG +  --VZV IgG+  -- Measles IGG+, Mumps IGG- and Rubella IgG + but live virus vaccine so will defer until heart service decides next steps  --Recommend Quantiferon Gold  --Recommend HIV Test, viral load was negative  --Recommend Syphilis Screen   -- Toxoplasma IgG -  -- Strongyloides Ab negative  --Recommend Trypanosoma cruzii serology    #, Leukocytosis   --would send blood cultures x2 sets  --agree with [facility] CVC removal  --Monitor off of antibiotics  --If clinical status changes would start Vancomycin + Cefepime      #COVID19  RVP (11/1) Positive for COVID19  RVP (11/10) Negative  Did not receive therapy for COVID19  Low suspicion that hypoxia currently is secondary to COVID19 (favor cardiogenic causes)  --Complete isolation per infection control protocol    Would vaccinate with  HAvrix  Heplisav  PRevnar-20  Seasonal flu vaccine    Chao Peters MD  Can be called via Teams  After 5pm/weekends [phone]          Chao Peters MD  Can be called via Teams  After 5pm/weekends [phone]

## 2023-11-18 LAB
ALBUMIN SERPL ELPH-MCNC: 3.5 G/DL — SIGNIFICANT CHANGE UP (ref 3.3–5)
ALBUMIN SERPL ELPH-MCNC: 3.5 G/DL — SIGNIFICANT CHANGE UP (ref 3.3–5)
ALBUMIN SERPL ELPH-MCNC: 3.6 G/DL — SIGNIFICANT CHANGE UP (ref 3.3–5)
ALBUMIN SERPL ELPH-MCNC: 3.6 G/DL — SIGNIFICANT CHANGE UP (ref 3.3–5)
ALP SERPL-CCNC: 62 U/L — SIGNIFICANT CHANGE UP (ref 40–120)
ALP SERPL-CCNC: 62 U/L — SIGNIFICANT CHANGE UP (ref 40–120)
ALP SERPL-CCNC: 64 U/L — SIGNIFICANT CHANGE UP (ref 40–120)
ALP SERPL-CCNC: 64 U/L — SIGNIFICANT CHANGE UP (ref 40–120)
ALT FLD-CCNC: 45 U/L — SIGNIFICANT CHANGE UP (ref 10–45)
ALT FLD-CCNC: 45 U/L — SIGNIFICANT CHANGE UP (ref 10–45)
ALT FLD-CCNC: 49 U/L — HIGH (ref 10–45)
ALT FLD-CCNC: 49 U/L — HIGH (ref 10–45)
ANION GAP SERPL CALC-SCNC: 12 MMOL/L — SIGNIFICANT CHANGE UP (ref 5–17)
ANION GAP SERPL CALC-SCNC: 12 MMOL/L — SIGNIFICANT CHANGE UP (ref 5–17)
ANION GAP SERPL CALC-SCNC: 14 MMOL/L — SIGNIFICANT CHANGE UP (ref 5–17)
ANION GAP SERPL CALC-SCNC: 14 MMOL/L — SIGNIFICANT CHANGE UP (ref 5–17)
APPEARANCE UR: CLEAR — SIGNIFICANT CHANGE UP
APPEARANCE UR: CLEAR — SIGNIFICANT CHANGE UP
APTT BLD: 63.6 SEC — HIGH (ref 24.5–35.6)
APTT BLD: 63.6 SEC — HIGH (ref 24.5–35.6)
AST SERPL-CCNC: 22 U/L — SIGNIFICANT CHANGE UP (ref 10–40)
AST SERPL-CCNC: 22 U/L — SIGNIFICANT CHANGE UP (ref 10–40)
AST SERPL-CCNC: 23 U/L — SIGNIFICANT CHANGE UP (ref 10–40)
AST SERPL-CCNC: 23 U/L — SIGNIFICANT CHANGE UP (ref 10–40)
BASOPHILS # BLD AUTO: 0.06 K/UL — SIGNIFICANT CHANGE UP (ref 0–0.2)
BASOPHILS # BLD AUTO: 0.06 K/UL — SIGNIFICANT CHANGE UP (ref 0–0.2)
BASOPHILS NFR BLD AUTO: 0.4 % — SIGNIFICANT CHANGE UP (ref 0–2)
BASOPHILS NFR BLD AUTO: 0.4 % — SIGNIFICANT CHANGE UP (ref 0–2)
BILIRUB SERPL-MCNC: 0.4 MG/DL — SIGNIFICANT CHANGE UP (ref 0.2–1.2)
BILIRUB SERPL-MCNC: 0.4 MG/DL — SIGNIFICANT CHANGE UP (ref 0.2–1.2)
BILIRUB SERPL-MCNC: 0.5 MG/DL — SIGNIFICANT CHANGE UP (ref 0.2–1.2)
BILIRUB SERPL-MCNC: 0.5 MG/DL — SIGNIFICANT CHANGE UP (ref 0.2–1.2)
BILIRUB UR-MCNC: NEGATIVE — SIGNIFICANT CHANGE UP
BILIRUB UR-MCNC: NEGATIVE — SIGNIFICANT CHANGE UP
BUN SERPL-MCNC: 35 MG/DL — HIGH (ref 7–23)
CALCIUM SERPL-MCNC: 9.5 MG/DL — SIGNIFICANT CHANGE UP (ref 8.4–10.5)
CALCIUM SERPL-MCNC: 9.5 MG/DL — SIGNIFICANT CHANGE UP (ref 8.4–10.5)
CALCIUM SERPL-MCNC: 9.6 MG/DL — SIGNIFICANT CHANGE UP (ref 8.4–10.5)
CALCIUM SERPL-MCNC: 9.6 MG/DL — SIGNIFICANT CHANGE UP (ref 8.4–10.5)
CHLORIDE SERPL-SCNC: 100 MMOL/L — SIGNIFICANT CHANGE UP (ref 96–108)
CHLORIDE SERPL-SCNC: 100 MMOL/L — SIGNIFICANT CHANGE UP (ref 96–108)
CHLORIDE SERPL-SCNC: 98 MMOL/L — SIGNIFICANT CHANGE UP (ref 96–108)
CHLORIDE SERPL-SCNC: 98 MMOL/L — SIGNIFICANT CHANGE UP (ref 96–108)
CO2 SERPL-SCNC: 18 MMOL/L — LOW (ref 22–31)
CO2 SERPL-SCNC: 18 MMOL/L — LOW (ref 22–31)
CO2 SERPL-SCNC: 19 MMOL/L — LOW (ref 22–31)
CO2 SERPL-SCNC: 19 MMOL/L — LOW (ref 22–31)
COLOR SPEC: YELLOW — SIGNIFICANT CHANGE UP
COLOR SPEC: YELLOW — SIGNIFICANT CHANGE UP
CREAT SERPL-MCNC: 1.73 MG/DL — HIGH (ref 0.5–1.3)
CREAT SERPL-MCNC: 1.73 MG/DL — HIGH (ref 0.5–1.3)
CREAT SERPL-MCNC: 1.84 MG/DL — HIGH (ref 0.5–1.3)
CREAT SERPL-MCNC: 1.84 MG/DL — HIGH (ref 0.5–1.3)
DIFF PNL FLD: NEGATIVE — SIGNIFICANT CHANGE UP
DIFF PNL FLD: NEGATIVE — SIGNIFICANT CHANGE UP
E FAECALIS DNA BLD POS QL NAA+NON-PROBE: SIGNIFICANT CHANGE UP
E FAECALIS DNA BLD POS QL NAA+NON-PROBE: SIGNIFICANT CHANGE UP
EGFR: 42 ML/MIN/1.73M2 — LOW
EGFR: 42 ML/MIN/1.73M2 — LOW
EGFR: 45 ML/MIN/1.73M2 — LOW
EGFR: 45 ML/MIN/1.73M2 — LOW
EOSINOPHIL # BLD AUTO: 0.31 K/UL — SIGNIFICANT CHANGE UP (ref 0–0.5)
EOSINOPHIL # BLD AUTO: 0.31 K/UL — SIGNIFICANT CHANGE UP (ref 0–0.5)
EOSINOPHIL NFR BLD AUTO: 2.2 % — SIGNIFICANT CHANGE UP (ref 0–6)
EOSINOPHIL NFR BLD AUTO: 2.2 % — SIGNIFICANT CHANGE UP (ref 0–6)
GAS PNL BLDA: SIGNIFICANT CHANGE UP
GAS PNL BLDA: SIGNIFICANT CHANGE UP
GLUCOSE BLDC GLUCOMTR-MCNC: 150 MG/DL — HIGH (ref 70–99)
GLUCOSE BLDC GLUCOMTR-MCNC: 150 MG/DL — HIGH (ref 70–99)
GLUCOSE BLDC GLUCOMTR-MCNC: 163 MG/DL — HIGH (ref 70–99)
GLUCOSE BLDC GLUCOMTR-MCNC: 163 MG/DL — HIGH (ref 70–99)
GLUCOSE BLDC GLUCOMTR-MCNC: 192 MG/DL — HIGH (ref 70–99)
GLUCOSE BLDC GLUCOMTR-MCNC: 192 MG/DL — HIGH (ref 70–99)
GLUCOSE BLDC GLUCOMTR-MCNC: 220 MG/DL — HIGH (ref 70–99)
GLUCOSE BLDC GLUCOMTR-MCNC: 220 MG/DL — HIGH (ref 70–99)
GLUCOSE SERPL-MCNC: 147 MG/DL — HIGH (ref 70–99)
GLUCOSE SERPL-MCNC: 147 MG/DL — HIGH (ref 70–99)
GLUCOSE SERPL-MCNC: 160 MG/DL — HIGH (ref 70–99)
GLUCOSE SERPL-MCNC: 160 MG/DL — HIGH (ref 70–99)
GLUCOSE UR QL: 500 MG/DL
GLUCOSE UR QL: 500 MG/DL
GRAM STN FLD: ABNORMAL
HCT VFR BLD CALC: 33.6 % — LOW (ref 39–50)
HCT VFR BLD CALC: 33.6 % — LOW (ref 39–50)
HGB BLD-MCNC: 11.2 G/DL — LOW (ref 13–17)
HGB BLD-MCNC: 11.2 G/DL — LOW (ref 13–17)
IMM GRANULOCYTES NFR BLD AUTO: 0.9 % — SIGNIFICANT CHANGE UP (ref 0–0.9)
IMM GRANULOCYTES NFR BLD AUTO: 0.9 % — SIGNIFICANT CHANGE UP (ref 0–0.9)
INR BLD: 1.29 RATIO — HIGH (ref 0.85–1.18)
INR BLD: 1.29 RATIO — HIGH (ref 0.85–1.18)
KETONES UR-MCNC: NEGATIVE MG/DL — SIGNIFICANT CHANGE UP
KETONES UR-MCNC: NEGATIVE MG/DL — SIGNIFICANT CHANGE UP
LEUKOCYTE ESTERASE UR-ACNC: NEGATIVE — SIGNIFICANT CHANGE UP
LEUKOCYTE ESTERASE UR-ACNC: NEGATIVE — SIGNIFICANT CHANGE UP
LYMPHOCYTES # BLD AUTO: 1.88 K/UL — SIGNIFICANT CHANGE UP (ref 1–3.3)
LYMPHOCYTES # BLD AUTO: 1.88 K/UL — SIGNIFICANT CHANGE UP (ref 1–3.3)
LYMPHOCYTES # BLD AUTO: 13.4 % — SIGNIFICANT CHANGE UP (ref 13–44)
LYMPHOCYTES # BLD AUTO: 13.4 % — SIGNIFICANT CHANGE UP (ref 13–44)
MAGNESIUM SERPL-MCNC: 1.9 MG/DL — SIGNIFICANT CHANGE UP (ref 1.6–2.6)
MAGNESIUM SERPL-MCNC: 1.9 MG/DL — SIGNIFICANT CHANGE UP (ref 1.6–2.6)
MAGNESIUM SERPL-MCNC: 2.2 MG/DL — SIGNIFICANT CHANGE UP (ref 1.6–2.6)
MAGNESIUM SERPL-MCNC: 2.2 MG/DL — SIGNIFICANT CHANGE UP (ref 1.6–2.6)
MCHC RBC-ENTMCNC: 28.9 PG — SIGNIFICANT CHANGE UP (ref 27–34)
MCHC RBC-ENTMCNC: 28.9 PG — SIGNIFICANT CHANGE UP (ref 27–34)
MCHC RBC-ENTMCNC: 33.3 GM/DL — SIGNIFICANT CHANGE UP (ref 32–36)
MCHC RBC-ENTMCNC: 33.3 GM/DL — SIGNIFICANT CHANGE UP (ref 32–36)
MCV RBC AUTO: 86.8 FL — SIGNIFICANT CHANGE UP (ref 80–100)
MCV RBC AUTO: 86.8 FL — SIGNIFICANT CHANGE UP (ref 80–100)
METHOD TYPE: SIGNIFICANT CHANGE UP
METHOD TYPE: SIGNIFICANT CHANGE UP
MONOCYTES # BLD AUTO: 0.86 K/UL — SIGNIFICANT CHANGE UP (ref 0–0.9)
MONOCYTES # BLD AUTO: 0.86 K/UL — SIGNIFICANT CHANGE UP (ref 0–0.9)
MONOCYTES NFR BLD AUTO: 6.1 % — SIGNIFICANT CHANGE UP (ref 2–14)
MONOCYTES NFR BLD AUTO: 6.1 % — SIGNIFICANT CHANGE UP (ref 2–14)
NEUTROPHILS # BLD AUTO: 10.82 K/UL — HIGH (ref 1.8–7.4)
NEUTROPHILS # BLD AUTO: 10.82 K/UL — HIGH (ref 1.8–7.4)
NEUTROPHILS NFR BLD AUTO: 77 % — SIGNIFICANT CHANGE UP (ref 43–77)
NEUTROPHILS NFR BLD AUTO: 77 % — SIGNIFICANT CHANGE UP (ref 43–77)
NITRITE UR-MCNC: NEGATIVE — SIGNIFICANT CHANGE UP
NITRITE UR-MCNC: NEGATIVE — SIGNIFICANT CHANGE UP
NRBC # BLD: 0 /100 WBCS — SIGNIFICANT CHANGE UP (ref 0–0)
NRBC # BLD: 0 /100 WBCS — SIGNIFICANT CHANGE UP (ref 0–0)
PH UR: 5.5 — SIGNIFICANT CHANGE UP (ref 5–8)
PH UR: 5.5 — SIGNIFICANT CHANGE UP (ref 5–8)
PHOSPHATE SERPL-MCNC: 3.1 MG/DL — SIGNIFICANT CHANGE UP (ref 2.5–4.5)
PHOSPHATE SERPL-MCNC: 3.1 MG/DL — SIGNIFICANT CHANGE UP (ref 2.5–4.5)
PHOSPHATE SERPL-MCNC: 3.5 MG/DL — SIGNIFICANT CHANGE UP (ref 2.5–4.5)
PHOSPHATE SERPL-MCNC: 3.5 MG/DL — SIGNIFICANT CHANGE UP (ref 2.5–4.5)
PLATELET # BLD AUTO: 277 K/UL — SIGNIFICANT CHANGE UP (ref 150–400)
PLATELET # BLD AUTO: 277 K/UL — SIGNIFICANT CHANGE UP (ref 150–400)
POTASSIUM SERPL-MCNC: 4.5 MMOL/L — SIGNIFICANT CHANGE UP (ref 3.5–5.3)
POTASSIUM SERPL-MCNC: 4.5 MMOL/L — SIGNIFICANT CHANGE UP (ref 3.5–5.3)
POTASSIUM SERPL-MCNC: 4.7 MMOL/L — SIGNIFICANT CHANGE UP (ref 3.5–5.3)
POTASSIUM SERPL-MCNC: 4.7 MMOL/L — SIGNIFICANT CHANGE UP (ref 3.5–5.3)
POTASSIUM SERPL-SCNC: 4.5 MMOL/L — SIGNIFICANT CHANGE UP (ref 3.5–5.3)
POTASSIUM SERPL-SCNC: 4.5 MMOL/L — SIGNIFICANT CHANGE UP (ref 3.5–5.3)
POTASSIUM SERPL-SCNC: 4.7 MMOL/L — SIGNIFICANT CHANGE UP (ref 3.5–5.3)
POTASSIUM SERPL-SCNC: 4.7 MMOL/L — SIGNIFICANT CHANGE UP (ref 3.5–5.3)
PROCALCITONIN SERPL-MCNC: 0.13 NG/ML — HIGH (ref 0.02–0.1)
PROCALCITONIN SERPL-MCNC: 0.13 NG/ML — HIGH (ref 0.02–0.1)
PROT SERPL-MCNC: 7.2 G/DL — SIGNIFICANT CHANGE UP (ref 6–8.3)
PROT SERPL-MCNC: 7.2 G/DL — SIGNIFICANT CHANGE UP (ref 6–8.3)
PROT SERPL-MCNC: 7.8 G/DL — SIGNIFICANT CHANGE UP (ref 6–8.3)
PROT SERPL-MCNC: 7.8 G/DL — SIGNIFICANT CHANGE UP (ref 6–8.3)
PROT UR-MCNC: NEGATIVE MG/DL — SIGNIFICANT CHANGE UP
PROT UR-MCNC: NEGATIVE MG/DL — SIGNIFICANT CHANGE UP
PROTHROM AB SERPL-ACNC: 14.1 SEC — HIGH (ref 9.5–13)
PROTHROM AB SERPL-ACNC: 14.1 SEC — HIGH (ref 9.5–13)
RBC # BLD: 3.87 M/UL — LOW (ref 4.2–5.8)
RBC # BLD: 3.87 M/UL — LOW (ref 4.2–5.8)
RBC # FLD: 14 % — SIGNIFICANT CHANGE UP (ref 10.3–14.5)
RBC # FLD: 14 % — SIGNIFICANT CHANGE UP (ref 10.3–14.5)
SODIUM SERPL-SCNC: 129 MMOL/L — LOW (ref 135–145)
SODIUM SERPL-SCNC: 129 MMOL/L — LOW (ref 135–145)
SODIUM SERPL-SCNC: 130 MMOL/L — LOW (ref 135–145)
SODIUM SERPL-SCNC: 130 MMOL/L — LOW (ref 135–145)
SP GR SPEC: 1.01 — SIGNIFICANT CHANGE UP (ref 1–1.03)
SP GR SPEC: 1.01 — SIGNIFICANT CHANGE UP (ref 1–1.03)
SPECIMEN SOURCE: SIGNIFICANT CHANGE UP
UROBILINOGEN FLD QL: 1 MG/DL — SIGNIFICANT CHANGE UP (ref 0.2–1)
UROBILINOGEN FLD QL: 1 MG/DL — SIGNIFICANT CHANGE UP (ref 0.2–1)
WBC # BLD: 14.05 K/UL — HIGH (ref 3.8–10.5)
WBC # BLD: 14.05 K/UL — HIGH (ref 3.8–10.5)
WBC # FLD AUTO: 14.05 K/UL — HIGH (ref 3.8–10.5)
WBC # FLD AUTO: 14.05 K/UL — HIGH (ref 3.8–10.5)

## 2023-11-18 PROCEDURE — 99291 CRITICAL CARE FIRST HOUR: CPT | Mod: GC

## 2023-11-18 PROCEDURE — 71045 X-RAY EXAM CHEST 1 VIEW: CPT | Mod: 26,76

## 2023-11-18 PROCEDURE — 99233 SBSQ HOSP IP/OBS HIGH 50: CPT

## 2023-11-18 PROCEDURE — 99232 SBSQ HOSP IP/OBS MODERATE 35: CPT

## 2023-11-18 PROCEDURE — 99292 CRITICAL CARE ADDL 30 MIN: CPT

## 2023-11-18 PROCEDURE — 99291 CRITICAL CARE FIRST HOUR: CPT

## 2023-11-18 PROCEDURE — 99222 1ST HOSP IP/OBS MODERATE 55: CPT

## 2023-11-18 PROCEDURE — 74176 CT ABD & PELVIS W/O CONTRAST: CPT | Mod: 26

## 2023-11-18 PROCEDURE — 93010 ELECTROCARDIOGRAM REPORT: CPT

## 2023-11-18 RX ORDER — MAGNESIUM SULFATE 500 MG/ML
1 VIAL (ML) INJECTION ONCE
Refills: 0 | Status: COMPLETED | OUTPATIENT
Start: 2023-11-18 | End: 2023-11-18

## 2023-11-18 RX ORDER — INSULIN GLARGINE 100 [IU]/ML
12 INJECTION, SOLUTION SUBCUTANEOUS AT BEDTIME
Refills: 0 | Status: DISCONTINUED | OUTPATIENT
Start: 2023-11-18 | End: 2023-12-24

## 2023-11-18 RX ORDER — VANCOMYCIN HCL 1 G
1500 VIAL (EA) INTRAVENOUS ONCE
Refills: 0 | Status: COMPLETED | OUTPATIENT
Start: 2023-11-18 | End: 2023-11-18

## 2023-11-18 RX ORDER — CEFTRIAXONE 500 MG/1
2000 INJECTION, POWDER, FOR SOLUTION INTRAMUSCULAR; INTRAVENOUS EVERY 24 HOURS
Refills: 0 | Status: DISCONTINUED | OUTPATIENT
Start: 2023-11-18 | End: 2023-11-18

## 2023-11-18 RX ADMIN — CARVEDILOL PHOSPHATE 25 MILLIGRAM(S): 80 CAPSULE, EXTENDED RELEASE ORAL at 17:29

## 2023-11-18 RX ADMIN — Medication 100 MILLIGRAM(S): at 05:17

## 2023-11-18 RX ADMIN — Medication 100 MILLIGRAM(S): at 22:01

## 2023-11-18 RX ADMIN — AMIODARONE HYDROCHLORIDE 200 MILLIGRAM(S): 400 TABLET ORAL at 05:17

## 2023-11-18 RX ADMIN — CHLORHEXIDINE GLUCONATE 1 APPLICATION(S): 213 SOLUTION TOPICAL at 21:20

## 2023-11-18 RX ADMIN — Medication 1: at 12:03

## 2023-11-18 RX ADMIN — Medication 8 UNIT(S): at 17:25

## 2023-11-18 RX ADMIN — Medication 8 UNIT(S): at 12:03

## 2023-11-18 RX ADMIN — ATORVASTATIN CALCIUM 80 MILLIGRAM(S): 80 TABLET, FILM COATED ORAL at 22:01

## 2023-11-18 RX ADMIN — Medication 100 GRAM(S): at 02:30

## 2023-11-18 RX ADMIN — ISOSORBIDE DINITRATE 40 MILLIGRAM(S): 5 TABLET ORAL at 16:05

## 2023-11-18 RX ADMIN — Medication 8 UNIT(S): at 08:18

## 2023-11-18 RX ADMIN — Medication 1: at 08:18

## 2023-11-18 RX ADMIN — BUDESONIDE AND FORMOTEROL FUMARATE DIHYDRATE 2 PUFF(S): 160; 4.5 AEROSOL RESPIRATORY (INHALATION) at 08:39

## 2023-11-18 RX ADMIN — Medication 300 MILLIGRAM(S): at 15:21

## 2023-11-18 RX ADMIN — Medication 2: at 17:28

## 2023-11-18 RX ADMIN — ISOSORBIDE DINITRATE 40 MILLIGRAM(S): 5 TABLET ORAL at 11:33

## 2023-11-18 RX ADMIN — Medication 100 GRAM(S): at 08:41

## 2023-11-18 RX ADMIN — HEPARIN SODIUM 12 UNIT(S)/HR: 5000 INJECTION INTRAVENOUS; SUBCUTANEOUS at 02:30

## 2023-11-18 RX ADMIN — ISOSORBIDE DINITRATE 40 MILLIGRAM(S): 5 TABLET ORAL at 05:53

## 2023-11-18 RX ADMIN — SPIRONOLACTONE 25 MILLIGRAM(S): 25 TABLET, FILM COATED ORAL at 05:17

## 2023-11-18 RX ADMIN — INSULIN GLARGINE 12 UNIT(S): 100 INJECTION, SOLUTION SUBCUTANEOUS at 22:01

## 2023-11-18 RX ADMIN — BUDESONIDE AND FORMOTEROL FUMARATE DIHYDRATE 2 PUFF(S): 160; 4.5 AEROSOL RESPIRATORY (INHALATION) at 00:35

## 2023-11-18 RX ADMIN — Medication 81 MILLIGRAM(S): at 11:33

## 2023-11-18 RX ADMIN — Medication 100 MILLIGRAM(S): at 14:16

## 2023-11-18 RX ADMIN — CARVEDILOL PHOSPHATE 25 MILLIGRAM(S): 80 CAPSULE, EXTENDED RELEASE ORAL at 05:53

## 2023-11-18 NOTE — PROGRESS NOTE ADULT - PROBLEM SELECTOR PLAN 5
- Persistent  mild leukocytosis of unclear etiology  - Procalcitonin mildly elevated, please trend daily  - 11/10 BCx NGTD, UA negative and RVP negative; please send repeat Bld Cx given rising WBC - Persistent  mild leukocytosis of unclear etiology  - Procalcitonin mildly elevated, please trend daily  - 11/10 BCx NGTD, UA negative and RVP negative; please send repeat Bld Cx given rising WBC  - Will be evaluated by transplant ID to ensure no ongoing infection

## 2023-11-18 NOTE — PROGRESS NOTE ADULT - SUBJECTIVE AND OBJECTIVE BOX
Subjective        Telemetry review:     Current Meds:  aMIOdarone    Tablet 200 milliGRAM(s) Oral daily  aMIOdarone    Tablet   Oral   aspirin  chewable 81 milliGRAM(s) Oral daily  atorvastatin 80 milliGRAM(s) Oral at bedtime  budesonide  80 MICROgram(s)/formoterol 4.5 MICROgram(s) Inhaler 2 Puff(s) Inhalation two times a day  carvedilol 25 milliGRAM(s) Oral every 12 hours  chlorhexidine 2% Cloths 1 Application(s) Topical daily  dextrose 50% Injectable 25 Gram(s) IV Push once  dextrose 50% Injectable 12.5 Gram(s) IV Push once  heparin  Infusion 1600 Unit(s)/Hr IV Continuous <Continuous>  hydrALAZINE 100 milliGRAM(s) Oral three times a day  insulin glargine Injectable (LANTUS) 8 Unit(s) SubCutaneous at bedtime  insulin lispro (ADMELOG) corrective regimen sliding scale   SubCutaneous three times a day before meals  insulin lispro (ADMELOG) corrective regimen sliding scale   SubCutaneous at bedtime  insulin lispro Injectable (ADMELOG) 8 Unit(s) SubCutaneous three times a day before meals  isosorbide   dinitrate Tablet (ISORDIL) 40 milliGRAM(s) Oral three times a day  LORazepam     Tablet 0.5 milliGRAM(s) Oral every 6 hours PRN  magnesium sulfate  IVPB 1 Gram(s) IV Intermittent once  spironolactone 25 milliGRAM(s) Oral daily      Vitals:  T(F): 99.1 (11-18), Max: 99.1 (11-17)  HR: 73 (11-18) (71 - 87)  BP: 97/50 (11-18) (97/50 - 108/58)  RR: 18 (11-18)  SpO2: 95% (11-18)  I&O's Summary    17 Nov 2023 07:01  -  18 Nov 2023 07:00  --------------------------------------------------------  IN: 1028 mL / OUT: 1770 mL / NET: -742 mL    18 Nov 2023 07:01  -  18 Nov 2023 08:33  --------------------------------------------------------  IN: 252 mL / OUT: 0 mL / NET: 252 mL        Physical Exam:  General: No acute distress; well appearing  Eyes: PERRL, EOMI, pink conjunctiva  HEENT: Normal oral mucosa  Cardiovascular: RRR, S1, S2, no murmurs, rubs, or gallops; no edema; no JVD  Respiratory: Clear to auscultation bilaterally, speaking full sentences  Gastrointestinal: soft, non-tender, non-distended with normal bowel sounds  Extremities: 2+ pulses, no clubbing; no joint deformity, or edema  Neurologic: AAOx3,  Non-focal  Skin: No rashes, ecchymoses, or cyanosis                          11.2   14.05 )-----------( 277      ( 18 Nov 2023 00:24 )             33.6     11-18    129<L>  |  100  |  35<H>  ----------------------------<  160<H>  4.5   |  19<L>  |  1.84<H>    Ca    9.5      18 Nov 2023 00:24  Phos  3.5     11-18  Mg     1.9     11-18    TPro  7.2  /  Alb  3.5  /  TBili  0.4  /  DBili  x   /  AST  22  /  ALT  49<H>  /  AlkPhos  62  11-18    PT/INR - ( 18 Nov 2023 00:24 )   PT: 14.1 sec;   INR: 1.29 ratio         PTT - ( 18 Nov 2023 00:24 )  PTT:63.6 sec               Subjective      Telemetry review:     Current Meds:  aMIOdarone    Tablet 200 milliGRAM(s) Oral daily  aMIOdarone    Tablet   Oral   aspirin  chewable 81 milliGRAM(s) Oral daily  atorvastatin 80 milliGRAM(s) Oral at bedtime  budesonide  80 MICROgram(s)/formoterol 4.5 MICROgram(s) Inhaler 2 Puff(s) Inhalation two times a day  carvedilol 25 milliGRAM(s) Oral every 12 hours  chlorhexidine 2% Cloths 1 Application(s) Topical daily  dextrose 50% Injectable 25 Gram(s) IV Push once  dextrose 50% Injectable 12.5 Gram(s) IV Push once  heparin  Infusion 1600 Unit(s)/Hr IV Continuous <Continuous>  hydrALAZINE 100 milliGRAM(s) Oral three times a day  insulin glargine Injectable (LANTUS) 8 Unit(s) SubCutaneous at bedtime  insulin lispro (ADMELOG) corrective regimen sliding scale   SubCutaneous three times a day before meals  insulin lispro (ADMELOG) corrective regimen sliding scale   SubCutaneous at bedtime  insulin lispro Injectable (ADMELOG) 8 Unit(s) SubCutaneous three times a day before meals  isosorbide   dinitrate Tablet (ISORDIL) 40 milliGRAM(s) Oral three times a day  LORazepam     Tablet 0.5 milliGRAM(s) Oral every 6 hours PRN  magnesium sulfate  IVPB 1 Gram(s) IV Intermittent once  spironolactone 25 milliGRAM(s) Oral daily      Vitals:  T(F): 99.1 (11-18), Max: 99.1 (11-17)  HR: 73 (11-18) (71 - 87)  BP: 97/50 (11-18) (97/50 - 108/58)  RR: 18 (11-18)  SpO2: 95% (11-18)  I&O's Summary    17 Nov 2023 07:01  -  18 Nov 2023 07:00  --------------------------------------------------------  IN: 1028 mL / OUT: 1770 mL / NET: -742 mL    18 Nov 2023 07:01  -  18 Nov 2023 08:33  --------------------------------------------------------  IN: 252 mL / OUT: 0 mL / NET: 252 mL        Physical Exam:  General: No acute distress; well appearing  Eyes: PERRL, EOMI, pink conjunctiva  HEENT: Normal oral mucosa  Cardiovascular: RRR, S1, S2, no murmurs, rubs, or gallops; no edema; no JVD  Respiratory: Clear to auscultation bilaterally, speaking full sentences  Gastrointestinal: soft, non-tender, non-distended with normal bowel sounds  Extremities: 2+ pulses, no clubbing; no joint deformity, or edema  Neurologic: AAOx3,  Non-focal  Skin: No rashes, ecchymoses, or cyanosis                          11.2   14.05 )-----------( 277      ( 18 Nov 2023 00:24 )             33.6     11-18    129<L>  |  100  |  35<H>  ----------------------------<  160<H>  4.5   |  19<L>  |  1.84<H>    Ca    9.5      18 Nov 2023 00:24  Phos  3.5     11-18  Mg     1.9     11-18    TPro  7.2  /  Alb  3.5  /  TBili  0.4  /  DBili  x   /  AST  22  /  ALT  49<H>  /  AlkPhos  62  11-18    PT/INR - ( 18 Nov 2023 00:24 )   PT: 14.1 sec;   INR: 1.29 ratio         PTT - ( 18 Nov 2023 00:24 )  PTT:63.6 sec               Subjective    Seen and examined at bedside, reports some mild chest discomfort worse with palpation and deep breaths.       Telemetry review: Sinus rhythm no ectopy noted    Current Meds:  aMIOdarone    Tablet 200 milliGRAM(s) Oral daily  aMIOdarone    Tablet   Oral   aspirin  chewable 81 milliGRAM(s) Oral daily  atorvastatin 80 milliGRAM(s) Oral at bedtime  budesonide  80 MICROgram(s)/formoterol 4.5 MICROgram(s) Inhaler 2 Puff(s) Inhalation two times a day  carvedilol 25 milliGRAM(s) Oral every 12 hours  chlorhexidine 2% Cloths 1 Application(s) Topical daily  dextrose 50% Injectable 25 Gram(s) IV Push once  dextrose 50% Injectable 12.5 Gram(s) IV Push once  heparin  Infusion 1600 Unit(s)/Hr IV Continuous <Continuous>  hydrALAZINE 100 milliGRAM(s) Oral three times a day  insulin glargine Injectable (LANTUS) 8 Unit(s) SubCutaneous at bedtime  insulin lispro (ADMELOG) corrective regimen sliding scale   SubCutaneous three times a day before meals  insulin lispro (ADMELOG) corrective regimen sliding scale   SubCutaneous at bedtime  insulin lispro Injectable (ADMELOG) 8 Unit(s) SubCutaneous three times a day before meals  isosorbide   dinitrate Tablet (ISORDIL) 40 milliGRAM(s) Oral three times a day  LORazepam     Tablet 0.5 milliGRAM(s) Oral every 6 hours PRN  magnesium sulfate  IVPB 1 Gram(s) IV Intermittent once  spironolactone 25 milliGRAM(s) Oral daily      Vitals:  T(F): 99.1 (11-18), Max: 99.1 (11-17)  HR: 73 (11-18) (71 - 87)  BP: 97/50 (11-18) (97/50 - 108/58)  RR: 18 (11-18)  SpO2: 95% (11-18)  I&O's Summary    17 Nov 2023 07:01  -  18 Nov 2023 07:00  --------------------------------------------------------  IN: 1028 mL / OUT: 1770 mL / NET: -742 mL    18 Nov 2023 07:01  -  18 Nov 2023 08:33  --------------------------------------------------------  IN: 252 mL / OUT: 0 mL / NET: 252 mL      Physical Exam:  General: No acute distress; well appearing  Eyes: PERRL, EOMI, pink conjunctiva  HEENT: Normal oral mucosa  Cardiovascular: RRR, S1, S2, no murmurs, rubs, or gallops; no edema; no JVD  Respiratory: Clear to auscultation bilaterally, speaking full sentences  Gastrointestinal: soft, non-tender, non-distended with normal bowel sounds  Extremities: 2+ pulses, no clubbing; no joint deformity, or edema  Neurologic: AAOx3,  Non-focal  Skin: No rashes, ecchymoses, or cyanosis                          11.2   14.05 )-----------( 277      ( 18 Nov 2023 00:24 )             33.6     11-18    129<L>  |  100  |  35<H>  ----------------------------<  160<H>  4.5   |  19<L>  |  1.84<H>    Ca    9.5      18 Nov 2023 00:24  Phos  3.5     11-18  Mg     1.9     11-18    TPro  7.2  /  Alb  3.5  /  TBili  0.4  /  DBili  x   /  AST  22  /  ALT  49<H>  /  AlkPhos  62  11-18    PT/INR - ( 18 Nov 2023 00:24 )   PT: 14.1 sec;   INR: 1.29 ratio         PTT - ( 18 Nov 2023 00:24 )  PTT:63.6 sec

## 2023-11-18 NOTE — CONSULT NOTE ADULT - SUBJECTIVE AND OBJECTIVE BOX
[facility] DIVISION OF KIDNEY DISEASES AND HYPERTENSION -- [phone]  -- INITIAL CONSULT NOTE  --------------------------------------------------------------------------------  HPI:   58 yrs old male w/ hx of HFrEF (LVIDd 6.4 cm, LVEF 32%), CAD s/p PCI (2008), HTN, DMT2 (A1c 8.3%) and CVA s/p TPA (2018), recently treated for PNA who initially presented to [facility] via EMS after syncope reportedly requiring defibrilation. Treated for ACS but left AMA to come to [facility]. On arrival ECG with ST depression in lateral leads. Intubated 11/1 for respiratory failure. Found to have COVID. L/RHC 11/1revealing  of LAD and RCA, elevated filling pressures and CI of 1.5 prompting placement of IABP. Extubated 11/3. IABP was weaned to off 11/7, then the following day on 11/8, developed PMVT cardiac arrest with prompt CPR and defibrillation, started on IV Lidocaine and Amio. IABP ultimately replaced on 11/9 for worsening hypotension. TTE 11/11 revealing LV thrombus, currently he is on heparin drip. 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 heart transplant.  Nephrology was consulted for ARIC and evaluation for clearance for cardiac transplant. At the time of presentation Scr is 1.25. Started to worsen on 11/4 and peaked at 4.07 11/10 and gradually improved to 1.8 on 11/18. Pt had LHC on 11/1, pt had Covid, hypotension 2/2 heart failure and was on IABP. Initially it was dced and placed back on it for hypotension.       PAST HISTORY  --------------------------------------------------------------------------------  PAST MEDICAL & SURGICAL HISTORY:  HTN (hypertension)      CAD (coronary artery disease)  2009; stent      Intracranial hemorrhage  2008      Respiratory arrest  december 1st      Myocardial infarction, unspecified MI type, unspecified artery      History of coronary artery stent placement        FAMILY HISTORY:  Family history of meningitis (Sibling)  Brother, death at age 49 from complications of infection (June 2015)    Family history of hypertension in mother  Mother      PAST SOCIAL HISTORY:    ALLERGIES & MEDICATIONS  --------------------------------------------------------------------------------  Allergies    penicillins (Unknown)    Intolerances      Standing Inpatient Medications  aMIOdarone    Tablet 200 milliGRAM(s) Oral daily  aMIOdarone    Tablet   Oral   aspirin  chewable 81 milliGRAM(s) Oral daily  atorvastatin 80 milliGRAM(s) Oral at bedtime  budesonide  80 MICROgram(s)/formoterol 4.5 MICROgram(s) Inhaler 2 Puff(s) Inhalation two times a day  carvedilol 25 milliGRAM(s) Oral every 12 hours  chlorhexidine 2% Cloths 1 Application(s) Topical daily  dextrose 50% Injectable 25 Gram(s) IV Push once  dextrose 50% Injectable 12.5 Gram(s) IV Push once  heparin  Infusion 1600 Unit(s)/Hr IV Continuous <Continuous>  hydrALAZINE 100 milliGRAM(s) Oral three times a day  insulin glargine Injectable (LANTUS) 12 Unit(s) SubCutaneous at bedtime  insulin lispro (ADMELOG) corrective regimen sliding scale   SubCutaneous at bedtime  insulin lispro (ADMELOG) corrective regimen sliding scale   SubCutaneous three times a day before meals  insulin lispro Injectable (ADMELOG) 8 Unit(s) SubCutaneous three times a day before meals  isosorbide   dinitrate Tablet (ISORDIL) 40 milliGRAM(s) Oral three times a day  spironolactone 25 milliGRAM(s) Oral daily    PRN Inpatient Medications  LORazepam     Tablet 0.5 milliGRAM(s) Oral every 6 hours PRN      REVIEW OF SYSTEMS  --------------------------------------------------------------------------------  Gen: No fevers/chills  Skin: No rashes  Head/Eyes/Ears: Normal hearing,   Respiratory: No dyspnea, cough  CV: No chest pain  GI: No abdominal pain, diarrhea  : No dysuria, hematuria  MSK: No  edema  Heme: No easy bruising or bleeding  Psych: No significant depression    All other systems were reviewed and are negative, except as noted.    VITALS/PHYSICAL EXAM  --------------------------------------------------------------------------------  T(C): 37.1 (11-18-23 @ 19:00), Max: 37.3 (11-18-23 @ 07:00)  HR: 72 (11-18-23 @ 21:00) (71 - 82)  BP: 97/50 (11-18-23 @ 07:30) (97/50 - 97/50)  RR: 21 (11-18-23 @ 21:00) (15 - 26)  SpO2: 96% (11-18-23 @ 21:00) (94% - 99%)  Wt(kg): --  Height (cm): 175.3 (11-17-23 @ 10:03)  Weight (kg): 98.9 (11-17-23 @ 10:03)  BMI (kg/m2): 32.2 (11-17-23 @ 10:03)  BSA (m2): 2.14 (11-17-23 @ 10:03)      11-17-23 @ 07:01  -  11-18-23 @ 07:00  --------------------------------------------------------  IN: 1028 mL / OUT: 1770 mL / NET: -742 mL    11-18-23 @ 07:01  -  11-18-23 @ 21:41  --------------------------------------------------------  IN: 1288 mL / OUT: 925 mL / NET: 363 mL      Physical Exam:  	Gen: NAD  	HEENT: MMM  	Pulm: CTA B/L  	CV: S1S2  	Abd: Soft, +BS   	Ext: No LE edema B/L  	Neuro: Awake  	Skin: Warm and dry  	Vascular access:    LABS/STUDIES  --------------------------------------------------------------------------------              11.2   14.05 >-----------<  277      [11-18-23 @ 00:24]              33.6     130  |  98  |  35  ----------------------------<  147      [11-18-23 @ 13:59]  4.7   |  18  |  1.73        Ca     9.6     [11-18-23 @ 13:59]      Mg     2.2     [11-18-23 @ 13:59]      Phos  3.1     [11-18-23 @ 13:59]    TPro  7.8  /  Alb  3.6  /  TBili  0.5  /  DBili  x   /  AST  23  /  ALT  45  /  AlkPhos  64  [11-18-23 @ 13:59]    PT/INR: PT 14.1 , INR 1.29       [11-18-23 @ 00:24]  PTT: 63.6       [11-18-23 @ 00:24]      Creatinine Trend:  SCr 1.73 [11-18 @ 13:59]  SCr 1.84 [11-18 @ 00:24]  SCr 1.62 [11-17 @ 01:45]  SCr 1.64 [11-16 @ 00:55]  SCr 1.76 [11-15 @ 01:29]    Urinalysis - [11-18-23 @ 13:59]      Color  / Appearance  / SG  / pH       Gluc 147 / Ketone   / Bili  / Urobili        Blood  / Protein  / Leuk Est  / Nitrite       RBC  / WBC  / Hyaline  / Gran  / Sq Epi  / Non Sq Epi  / Bacteria     Urine Protein 24      [11-12-23 @ 05:03]    Iron 46, TIBC 234, %sat 20      [11-10-23 @ 17:25]  Ferritin 1646      [11-10-23 @ 17:29]  TSH 0.38      [11-10-23 @ 17:29]  Lipid: chol 130, TG 95, HDL 37, LDL --      [11-10-23 @ 17:25]    HBsAb Nonreact      [11-10-23 @ 17:29]  HBcAb Nonreact      [11-10-23 @ 17:29]  HCV 0.09, Nonreact      [11-10-23 @ 17:29]    BETZAIDA: titer Negative, pattern --      [11-10-23 @ 17:29]  Rheumatoid Factor 13      [11-10-23 @ 17:25]  Syphilis Screen (Treponema Pallidum Ab) Negative      [11-10-23 @ 17:29]  Free Light Chains: kappa 6.52, lambda 6.03, ratio = 1.08      [11-10 @ 17:29]  Immunofixation Serum: No Monoclonal Band Identified      Reference Range: None Detected      [11-10-23 @ 17:29]  SPEP Interpretation: Normal Electrophoresis Pattern      [11-10-23 @ 17:29]  Immunofixation Urine: No Monoclonal Band Identified      Reference Range: None Detected      [11-12-23 @ 05:03]  UPEP Interpretation: Normal Electrophoresis Pattern      [11-12-23 @ 05:03]   [facility] DIVISION OF KIDNEY DISEASES AND HYPERTENSION -- [phone]  -- INITIAL CONSULT NOTE  --------------------------------------------------------------------------------  HPI:   58 yrs old male w/ hx of HFrEF (LVIDd 6.4 cm, LVEF 32%), CAD s/p PCI (2008), HTN, DMT2 (A1c 8.3%) and CVA s/p TPA (2018), recently treated for PNA who initially presented to [facility] via EMS after syncope reportedly requiring defibrilation. Treated for ACS but left AMA to come to [facility]. On arrival ECG with ST depression in lateral leads. Intubated 11/1 for respiratory failure. Found to have COVID. L/RHC 11/1revealing  of LAD and RCA, elevated filling pressures and CI of 1.5 prompting placement of IABP. Extubated 11/3. IABP was weaned to off 11/7, then the following day on 11/8, developed PMVT cardiac arrest with prompt CPR and defibrillation, started on IV Lidocaine and Amio. IABP ultimately replaced on 11/9 for worsening hypotension. TTE 11/11 revealing LV thrombus, currently he is on heparin drip. 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 heart transplant.  Nephrology was consulted for ARIC and evaluation for risk assessment for cardiac transplant. At the time of presentation Scr is 1.25. Started to worsen on 11/4 and peaked at 4.07 11/10 and gradually improved to 1.8 on 11/18. Pt had LHC on 11/1, pt had Covid, hypotension 2/2 heart failure and was on IABP. Initially it was dced and placed back on it for hypotension.       PAST HISTORY  --------------------------------------------------------------------------------  PAST MEDICAL & SURGICAL HISTORY:  HTN (hypertension)      CAD (coronary artery disease)  2009; stent      Intracranial hemorrhage  2008      Respiratory arrest  december 1st      Myocardial infarction, unspecified MI type, unspecified artery      History of coronary artery stent placement        FAMILY HISTORY:  Family history of meningitis (Sibling)  Brother, death at age 49 from complications of infection (June 2015)    Family history of hypertension in mother  Mother      PAST SOCIAL HISTORY:    ALLERGIES & MEDICATIONS  --------------------------------------------------------------------------------  Allergies    penicillins (Unknown)    Intolerances      Standing Inpatient Medications  aMIOdarone    Tablet 200 milliGRAM(s) Oral daily  aMIOdarone    Tablet   Oral   aspirin  chewable 81 milliGRAM(s) Oral daily  atorvastatin 80 milliGRAM(s) Oral at bedtime  budesonide  80 MICROgram(s)/formoterol 4.5 MICROgram(s) Inhaler 2 Puff(s) Inhalation two times a day  carvedilol 25 milliGRAM(s) Oral every 12 hours  chlorhexidine 2% Cloths 1 Application(s) Topical daily  dextrose 50% Injectable 25 Gram(s) IV Push once  dextrose 50% Injectable 12.5 Gram(s) IV Push once  heparin  Infusion 1600 Unit(s)/Hr IV Continuous <Continuous>  hydrALAZINE 100 milliGRAM(s) Oral three times a day  insulin glargine Injectable (LANTUS) 12 Unit(s) SubCutaneous at bedtime  insulin lispro (ADMELOG) corrective regimen sliding scale   SubCutaneous at bedtime  insulin lispro (ADMELOG) corrective regimen sliding scale   SubCutaneous three times a day before meals  insulin lispro Injectable (ADMELOG) 8 Unit(s) SubCutaneous three times a day before meals  isosorbide   dinitrate Tablet (ISORDIL) 40 milliGRAM(s) Oral three times a day  spironolactone 25 milliGRAM(s) Oral daily    PRN Inpatient Medications  LORazepam     Tablet 0.5 milliGRAM(s) Oral every 6 hours PRN      REVIEW OF SYSTEMS  --------------------------------------------------------------------------------  Gen: No fevers/chills  Skin: No rashes  Head/Eyes/Ears: Normal hearing,   Respiratory: No dyspnea, cough  CV: No chest pain  GI: No abdominal pain, diarrhea  : No dysuria, hematuria  MSK: No  edema  Heme: No easy bruising or bleeding  Psych: No significant depression    All other systems were reviewed and are negative, except as noted.    VITALS/PHYSICAL EXAM  --------------------------------------------------------------------------------  T(C): 37.1 (11-18-23 @ 19:00), Max: 37.3 (11-18-23 @ 07:00)  HR: 72 (11-18-23 @ 21:00) (71 - 82)  BP: 97/50 (11-18-23 @ 07:30) (97/50 - 97/50)  RR: 21 (11-18-23 @ 21:00) (15 - 26)  SpO2: 96% (11-18-23 @ 21:00) (94% - 99%)  Wt(kg): --  Height (cm): 175.3 (11-17-23 @ 10:03)  Weight (kg): 98.9 (11-17-23 @ 10:03)  BMI (kg/m2): 32.2 (11-17-23 @ 10:03)  BSA (m2): 2.14 (11-17-23 @ 10:03)      11-17-23 @ 07:01  -  11-18-23 @ 07:00  --------------------------------------------------------  IN: 1028 mL / OUT: 1770 mL / NET: -742 mL    11-18-23 @ 07:01  -  11-18-23 @ 21:41  --------------------------------------------------------  IN: 1288 mL / OUT: 925 mL / NET: 363 mL      Physical Exam:  	Gen: NAD  	HEENT: MMM  	Pulm: CTA B/L  	CV: S1S2  	Abd: Soft, +BS   	Ext: No LE edema B/L  	Neuro: Awake  	Skin: Warm and dry  	Vascular access:    LABS/STUDIES  --------------------------------------------------------------------------------              11.2   14.05 >-----------<  277      [11-18-23 @ 00:24]              33.6     130  |  98  |  35  ----------------------------<  147      [11-18-23 @ 13:59]  4.7   |  18  |  1.73        Ca     9.6     [11-18-23 @ 13:59]      Mg     2.2     [11-18-23 @ 13:59]      Phos  3.1     [11-18-23 @ 13:59]    TPro  7.8  /  Alb  3.6  /  TBili  0.5  /  DBili  x   /  AST  23  /  ALT  45  /  AlkPhos  64  [11-18-23 @ 13:59]    PT/INR: PT 14.1 , INR 1.29       [11-18-23 @ 00:24]  PTT: 63.6       [11-18-23 @ 00:24]      Creatinine Trend:  SCr 1.73 [11-18 @ 13:59]  SCr 1.84 [11-18 @ 00:24]  SCr 1.62 [11-17 @ 01:45]  SCr 1.64 [11-16 @ 00:55]  SCr 1.76 [11-15 @ 01:29]    Urinalysis - [11-18-23 @ 13:59]      Color  / Appearance  / SG  / pH       Gluc 147 / Ketone   / Bili  / Urobili        Blood  / Protein  / Leuk Est  / Nitrite       RBC  / WBC  / Hyaline  / Gran  / Sq Epi  / Non Sq Epi  / Bacteria     Urine Protein 24      [11-12-23 @ 05:03]    Iron 46, TIBC 234, %sat 20      [11-10-23 @ 17:25]  Ferritin 1646      [11-10-23 @ 17:29]  TSH 0.38      [11-10-23 @ 17:29]  Lipid: chol 130, TG 95, HDL 37, LDL --      [11-10-23 @ 17:25]    HBsAb Nonreact      [11-10-23 @ 17:29]  HBcAb Nonreact      [11-10-23 @ 17:29]  HCV 0.09, Nonreact      [11-10-23 @ 17:29]    BETZAIDA: titer Negative, pattern --      [11-10-23 @ 17:29]  Rheumatoid Factor 13      [11-10-23 @ 17:25]  Syphilis Screen (Treponema Pallidum Ab) Negative      [11-10-23 @ 17:29]  Free Light Chains: kappa 6.52, lambda 6.03, ratio = 1.08      [11-10 @ 17:29]  Immunofixation Serum: No Monoclonal Band Identified      Reference Range: None Detected      [11-10-23 @ 17:29]  SPEP Interpretation: Normal Electrophoresis Pattern      [11-10-23 @ 17:29]  Immunofixation Urine: No Monoclonal Band Identified      Reference Range: None Detected      [11-12-23 @ 05:03]  UPEP Interpretation: Normal Electrophoresis Pattern      [11-12-23 @ 05:03]

## 2023-11-18 NOTE — PROGRESS NOTE ADULT - ATTENDING COMMENTS
Pt reports he feels better except for chest wall pain attributed to rib fx.   Remains on IABP, AAD, afterload reducing agents and AC.   He is pending a few items to be listed for heart tx: hepatology clearance (abd US/doppler), cardiorenal c/s and repeat chest CT (done). Hospital course now c/b leukocytosis and ARIC. Need to rule out infection and optimize renal function.   Consider POCUS to reassess volume status.   Pt is on high dose BB. May have to consider weaning in case pt has worsening low output. Lactate is negative so far.   Remains critically ill.   PLan as above.

## 2023-11-18 NOTE — PROGRESS NOTE ADULT - SUBJECTIVE AND OBJECTIVE BOX
LD VALENCIA  58y Male  MRN:[id number]    Patient is a 58y old  Male who presents with a chief complaint of NSTEMI (01 Nov 2023 20:29)    HPI:  57yo M w/ hx HTN, CAD w/ 1 stent in 2009, ICH (2008) presenting with abn ekg. Patient presented to [facility] where he was found to have STEMI, recommended to get cath however patient did not want to get it there so it left and came here.  Patient initially had cough, congestion, fever, was placed on antibiotics on Sunday.  Started feeling nauseous and had a presyncopal event after which he presented to ED last night.  Had chest pain as well.  Chest pain is midsternal.  Not currently having chest pain.  Received 4 aspirin 30 min pta. (01 Nov 2023 15:11)      Patient seen and evaluated at bedside in CCU. interval events noted    Interval HPI: remain in ccu. IABP in place     PAST MEDICAL & SURGICAL HISTORY:  HTN (hypertension)      CAD (coronary artery disease)  2009; stent      Intracranial hemorrhage  2008      Respiratory arrest  december 1st      Myocardial infarction, unspecified MI type, unspecified artery      History of coronary artery stent placement          REVIEW OF SYSTEMS:  as per hpi     VITALS:   ICU Vital Signs Last 24 Hrs  T(C): 37.3 (18 Nov 2023 14:00), Max: 37.3 (17 Nov 2023 19:00)  T(F): 99.2 (18 Nov 2023 14:00), Max: 99.2 (18 Nov 2023 14:00)  HR: 78 (18 Nov 2023 18:00) (71 - 87)  BP: 97/50 (18 Nov 2023 07:30) (97/50 - 97/50)  BP(mean): 69 (18 Nov 2023 07:30) (69 - 69)  ABP: 110/52 (18 Nov 2023 15:00) (96/47 - 128/53)  ABP(mean): 82 (18 Nov 2023 15:00) (72 - 104)  RR: 21 (18 Nov 2023 18:00) (15 - 26)  SpO2: 95% (18 Nov 2023 18:00) (94% - 99%)    O2 Parameters below as of 18 Nov 2023 18:00  Patient On (Oxygen Delivery Method): room air           PHYSICAL EXAM:  GENERAL: NAD, comfortable   HEAD:  Atraumatic, Normocephalic  EYES: EOMI, PERRLA, conjunctiva and sclera clear  NECK: Supple, No JVD   CHEST/LUNG: Clear to auscultation bilaterally; No wheeze  HEART: Regular rate and rhythm; No murmurs, rubs, or gallops  ABDOMEN: Soft, Nontender, Nondistended; Bowel sounds present  EXTREMITIES:  2+ Peripheral Pulses, No clubbing, cyanosis, or edema  NEUROLOGY: nonfocal  SKIN: No rashes or lesions  +iabp    Consultant(s) Notes Reviewed:  [x ] YES  [ ] NO  Care Discussed with Consultants/Other Providers [ x] YES  [ ] NO    MEDS:   MEDICATIONS  (STANDING):  aMIOdarone    Tablet 200 milliGRAM(s) Oral daily  aMIOdarone    Tablet   Oral   aspirin  chewable 81 milliGRAM(s) Oral daily  atorvastatin 80 milliGRAM(s) Oral at bedtime  budesonide  80 MICROgram(s)/formoterol 4.5 MICROgram(s) Inhaler 2 Puff(s) Inhalation two times a day  carvedilol 25 milliGRAM(s) Oral every 12 hours  chlorhexidine 2% Cloths 1 Application(s) Topical daily  dextrose 50% Injectable 25 Gram(s) IV Push once  dextrose 50% Injectable 12.5 Gram(s) IV Push once  heparin  Infusion 1600 Unit(s)/Hr (12 mL/Hr) IV Continuous <Continuous>  hydrALAZINE 100 milliGRAM(s) Oral three times a day  insulin glargine Injectable (LANTUS) 12 Unit(s) SubCutaneous at bedtime  insulin lispro (ADMELOG) corrective regimen sliding scale   SubCutaneous at bedtime  insulin lispro (ADMELOG) corrective regimen sliding scale   SubCutaneous three times a day before meals  insulin lispro Injectable (ADMELOG) 8 Unit(s) SubCutaneous three times a day before meals  isosorbide   dinitrate Tablet (ISORDIL) 40 milliGRAM(s) Oral three times a day  spironolactone 25 milliGRAM(s) Oral daily    MEDICATIONS  (PRN):  LORazepam     Tablet 0.5 milliGRAM(s) Oral every 6 hours PRN Anxiety        ALLERGIES:  penicillins (Unknown)      LABS:                                                         11.2   14.05 )-----------( 277      ( 18 Nov 2023 00:24 )             33.6   11-18    130<L>  |  98  |  35<H>  ----------------------------<  147<H>  4.7   |  18<L>  |  1.73<H>    Ca    9.6      18 Nov 2023 13:59  Phos  3.1     11-18  Mg     2.2     11-18    TPro  7.8  /  Alb  3.6  /  TBili  0.5  /  DBili  x   /  AST  23  /  ALT  45  /  AlkPhos  64  11-18      < from: Colonoscopy (11.17.23 @ 10:35) >                                                                                                        Impression:          - The entire examined colon is normal on direct and retroflexion views.                       - No specimens collected.  Recommendation:      - Resume previous diet today.                       - No large polyps or masses detected, No objection from GI standpoint to                 proceed with heart transplantation/advanced heart therapies.                       - Please call back should any further questions or concerns arise.    < end of copied text >     < from: Xray Chest 1 View- PORTABLE-Urgent (11.01.23 @ 07:42) >    IMPRESSION:  Clear lungs.    ---End of Report ---        < end of copied text >  < from: TTE W or WO Ultrasound Enhancing Agent (11.01.23 @ 10:23) >  _____________________________     CONCLUSIONS:      1. Left ventricular cavity is moderately dilated. Left ventricular wall thickness is normal. Left ventricular systolic function is severely decreased with an ejection fraction of 32 % by Chinchilla's method of disks. Regional wall motion abnormalities present.   2. Multiple segmental abnormalities exist. See findings.   3. There is moderate (grade 2) left ventricular diastolic dysfunction, with indeterminant filling pressure.   4. Normal right ventricular cavity size, wall thickness, and systolic function.   5. No significant valvular disease.   6. No pericardial effusion seen.   7. Compared to the transthoracic echocardiogram performed on 1/25/2017 the areas of akinesis are unchanged but there has been a decline in LV systolic function with new areas of hypokinesis.    __________________________________________________________________    < end of copied text >  < from: TTE Limited W or WO Ultrasound Enhancing Agent (11.02.23 @ 07:41) >  __________________________     CONCLUSIONS:      1. After obtaining consent, Definity ultrasound enhancing agent was given for enhanced left ventricular opacification and improved delineation of the left ventricular endocardial borders. Left ventricular systolic function is severely decreased with a calculated ejection fraction of 22 % by the Chinchilla's biplane method of disks. There is a left ventricular thrombus.   2. Findings were discussed with Litzy BOSS on 11/2/2023 at 8.49am.   3. There is a left ventricular thrombus.    _________________________________________________________________    < end of copied text >

## 2023-11-18 NOTE — PROGRESS NOTE ADULT - SUBJECTIVE AND OBJECTIVE BOX
Follow Up:  Pre-OHT    Interval History: Afebrile. noted to have blood cultures with Enterococcus.     REVIEW OF SYSTEMS  [  ] ROS unobtainable because:    [ x ] All other systems negative except as noted below    Constitutional:  [ ] fever [ ] chills  [ ] weight loss  [ ] weakness  Skin:  [ ] rash [ ] phlebitis	  Eyes: [ ] icterus [ ] pain  [ ] discharge	  ENMT: [ ] sore throat  [ ] thrush [ ] ulcers [ ] exudates  Respiratory: [ ] dyspnea [ ] hemoptysis [ ] cough [ ] sputum	  Cardiovascular:  [ ] chest pain [ ] palpitations [ ] edema	  Gastrointestinal:  [ ] nausea [ ] vomiting [ ] diarrhea [ ] constipation [ ] pain	  Genitourinary:  [ ] dysuria [ ] frequency [ ] hematuria [ ] discharge [ ] flank pain  [ ] incontinence  Musculoskeletal:  [ ] myalgias [ ] arthralgias [ ] arthritis  [ ] back pain  Neurological:  [ ] headache [ ] seizures  [ ] confusion/altered mental status    Allergies  penicillins (Unknown)        ANTIMICROBIALS:      OTHER MEDS:  MEDICATIONS  (STANDING):  aMIOdarone    Tablet 200 daily  aMIOdarone    Tablet    aspirin  chewable 81 daily  atorvastatin 80 at bedtime  budesonide  80 MICROgram(s)/formoterol 4.5 MICROgram(s) Inhaler 2 two times a day  carvedilol 25 every 12 hours  dextrose 50% Injectable 25 once  dextrose 50% Injectable 12.5 once  heparin  Infusion 1600 <Continuous>  hydrALAZINE 100 three times a day  insulin glargine Injectable (LANTUS) 8 at bedtime  insulin lispro (ADMELOG) corrective regimen sliding scale  three times a day before meals  insulin lispro (ADMELOG) corrective regimen sliding scale  at bedtime  insulin lispro Injectable (ADMELOG) 8 three times a day before meals  isosorbide   dinitrate Tablet (ISORDIL) 40 three times a day  LORazepam     Tablet 0.5 every 6 hours PRN  spironolactone 25 daily      Vital Signs Last 24 Hrs  T(C): 37.3 (18 Nov 2023 14:00), Max: 37.3 (17 Nov 2023 19:00)  T(F): 99.2 (18 Nov 2023 14:00), Max: 99.2 (18 Nov 2023 14:00)  HR: 78 (18 Nov 2023 15:00) (71 - 87)  BP: 97/50 (18 Nov 2023 07:30) (97/50 - 97/50)  BP(mean): 69 (18 Nov 2023 07:30) (69 - 69)  RR: 23 (18 Nov 2023 15:00) (15 - 26)  SpO2: 96% (18 Nov 2023 15:00) (94% - 99%)    Parameters below as of 18 Nov 2023 15:00  Patient On (Oxygen Delivery Method): room air        PHYSICAL EXAMINATION:  General: Alert and Awake, NAD  HEENT: PERRL, EOMI  Neck: Supple  Cardiac: RRR, No M/R/G  Resp: CTAB, No Wh/Rh/Ra  Abdomen: NBS, NT/ND, No HSM, No rigidity or guarding  MSK: No LE edema. No Calf tenderness  : No holt  Vasc: L Groin Balloon Pump  Skin: No rashes or lesions. Skin is warm and dry to the touch.   Neuro: Alert and Awake. CN 2-12 Grossly intact. Moves all four extremities spontaneously.  Psych: Calm, Pleasant, Cooperative                          11.2   14.05 )-----------( 277      ( 18 Nov 2023 00:24 )             33.6       11-18    130<L>  |  98  |  35<H>  ----------------------------<  147<H>  4.7   |  18<L>  |  1.73<H>    Ca    9.6      18 Nov 2023 13:59  Phos  3.1     11-18  Mg     2.2     11-18    TPro  7.8  /  Alb  3.6  /  TBili  0.5  /  DBili  x   /  AST  23  /  ALT  45  /  AlkPhos  64  11-18      Urinalysis Basic - ( 18 Nov 2023 13:59 )    Color: x / Appearance: x / SG: x / pH: x  Gluc: 147 mg/dL / Ketone: x  / Bili: x / Urobili: x   Blood: x / Protein: x / Nitrite: x   Leuk Esterase: x / RBC: x / WBC x   Sq Epi: x / Non Sq Epi: x / Bacteria: x        MICROBIOLOGY:  v  .Blood Blood  11-17-23   Growth in anaerobic bottle: Gram Positive Cocci in Pairs and Chains  --    Growth in anaerobic bottle: Gram Positive Cocci in Pairs and Chains      .Blood Blood  11-17-23   Growth in aerobic bottle: Gram positive cocci in pairs  Direct identification is available within approximately 3-5  hours either by Blood Panel Multiplexed PCR or Direct  MALDI-TOF. Details: [email or web address]  --  Blood Culture PCR      .Blood Blood-Peripheral  11-10-23   No growth at 5 days  --  --      .Blood Blood-Peripheral  11-04-23   No growth at 5 days  --  --      ET Tube ET Tube  11-02-23   No growth to date.  --    Rare polymorphonuclear leukocytes per low power field  No Squamous epithelial cells per low power field  No organisms seen per oil power field      .Blood Blood  11-02-23   No growth at 5 days  --  --      .Blood Blood  11-02-23   No growth at 5 days  --  --        CMV IgG Antibody: 4.20 U/mL (11-10-23 @ 17:29)  Toxoplasma IgG Screen: <3.0 IU/mL (11-10-23 @ 17:29)  HIV-1 RNA Quantitative, Viral Load Log: NOT DET. lg /mL (11-10-23 @ 17:06)          RADIOLOGY:    <The imaging below has been reviewed and visualized by me independently. Findings as detailed in report below>    < from: Xray Chest 1 View- PORTABLE-Urgent (Xray Chest 1 View- PORTABLE-Urgent .) (11.18.23 @ 09:25) >  IMPRESSION:  IABP as above.    < end of copied text >

## 2023-11-18 NOTE — CONSULT NOTE ADULT - ASSESSMENT
Martin Hardy is a  57 yo male with HTN, CAD (s/p PCI 2008), HFrEF, CVA 2018, and T2DM who initially presented with chest pressure and unknown tachycardia that was shocked x1, [facility] 11/1 found to have in-stent restenosis of pLAD and  of RCA with elevated RA and PA pressures and severely decreased EF 32%, admitted to CICU for management of cardiogenic shock requiring IABP 11/1 -11/7, with hospital course c/b vfib arrest requiring reinsertion of IABP.  He is now being considered for advanced therapies and pulmonary has been consulted for further evaluation.     Patient reports history of asthma with prior treatment with nucala but has been off therapy since 2018 and currently he has a normal eosinophil count. He is currently on room air, with recent CT chest showing residual left lower lobe atelectasis, improved from prior, with no other evidence of significant infectious process.  He denies any active symptoms and his physical exam is benign.      Recommendations:  - provide and encourage incentive spirometry,   - chest PT with acapella to facilitate mobilization of secretions and for atelectasis prevention  - albuterol PRN for wheezing  - no further pulmonary optimization needed--from pulmonary perspective team may proceed with further evaluation/planning for consideration/initiation of advanced therapies for end stage heart failure     Patient seen and discussed w/Dr. Zenia Smith [id number]  [id number]

## 2023-11-18 NOTE — CONSULT NOTE ADULT - PROBLEM SELECTOR RECOMMENDATION 9
Pt has baseline Scr of 1.0. After LHC and with Covid infection it has gradually worsened to 4.07 and improved to 1.8 today. He has hx of DM with proteinuria of 684. Primary team is planning to get cardiac transplant eval. Pt did not have work up for CKD. His ARIC could be due to GAYLE vs ATN . He has good urine output. Pt is not grossly volume overloaded.   BETZAIDA, HIV, Hep B, Hep C - negative.   A1c - 8.3    PLAN:  No acute intervention from nephrology point of view is indicated.  Send serology work up - PLA2R Ab, C3, C4, Anti GBM antibody, Anti Ds DNA, ANCA, Serum free light chains, immunofixation, UA, Urine lytes, USG- retroperitoneum with duplex renal vessels.  Rest of the management as per the primary team.

## 2023-11-18 NOTE — PROGRESS NOTE ADULT - PROBLEM SELECTOR PLAN 1
- L IABP at 1:1, placed 11/9. On AC with Heparin infusion (also for LV thrombus)  - Currently on Coreg 12.5 mg BID;  hold for MAP <65  - Continue HDZN 100 mg TID and ISDN 40 mg TID, hold for MAP <65  - Continue Spironolactone 25 mg QD  - PRN diuretics to target net even fluid balance  - AT evaluation: launched 11/10. GI on board, s/p bedside colonoscopy today; No masses or polyps found. Cleared from GI standpoint.   - Please Renal given Hx of DM and ARIC. Will also need to re-engage Hepatology for f/u on Elastography results.  - Pending CT chest will Pulm input once resulted.   - Please keep primary Dr. Banuelos [phone] in the loop per family request. - L IABP at 1:1, placed 11/9. On AC with Heparin infusion (also for LV thrombus)  - Currently on Coreg 12.5 mg BID;  hold for MAP <65  - Continue HDZN 100 mg TID and ISDN 40 mg TID, hold for MAP <65  - Continue Spironolactone 25 mg QD  - PRN diuretics to target net even fluid balance  - AT evaluation: launched 11/10. GI on board, s/p bedside colonoscopy today; No masses or polyps found. Cleared from GI standpoint.   - Please consult Renal given Hx of DM and ARIC ?dehydration from recent colonoscopy  -Will also need to re-engage Hepatology for f/u liver duplex U/S  - S/p CT chest with improved LLL aeration will Pulm input once resulted.   - Please keep primary Dr. Banuelos [phone] in the loop per family request. - L IABP at 1:1, placed 11/9. On AC with Heparin infusion (also for LV thrombus)  - Currently on Coreg 25 mg BID??  hold for MAP <65  - Continue HDZN 100 mg TID and ISDN 40 mg TID, hold for MAP <65  - Continue Spironolactone 25 mg QD  - PRN diuretics to target net even fluid balance  - AT evaluation: launched 11/10.   GI on board, s/p bedside colonoscopy today; No masses or polyps found. Cleared from GI standpoint.   Please consult cardiorenal given Hx of DM and ARIC on CKD  As per hepatology, US Elastography demonstrates minimal risk of clinically significant fibrosis.   Please obtain Abd US/doppler to assess patency of hepatic and portal veins as requested on 11/13.  If doppler is negative, there is no hepatic contraindication or further evaluation needed for advanced heart failure therapy.    S/p CT chest with improved LLL aeration will Pulm input once resulted.   - Please keep primary Dr. Banuelos [phone] in the loop per family request.  cPRA 0% 11/13

## 2023-11-18 NOTE — CONSULT NOTE ADULT - ATTENDING COMMENTS
58 yrs old male w/ hx of HFrEF (LVIDd 6.4 cm, LVEF 32%), CAD s/p PCI (2008), HTN, DMT2 (A1c 8.3%) and CVA s/p TPA (2018), recently treated for PNA who initially presented to [facility] via EMS after syncope reportedly requiring defibrilation. Treated for ACS but left AMA to come to [facility]. Pt has covid and was placed on IABP for hypotension. Now being evaluated for Heart transplant Vs LVAD placement. ARIC after the admission that has gradually worsened and plateaued on 11/10 and then gradually improved to 1.8 today. Not fluid overloaded. Electrolytes are with in normal limits. Alert conversant on IABP  1.  ARF on CKD-- major component acute likely contrast, cardiorenal, other.  Baseline CKD2 should be well tolerated if transplant or LVAD contemplated.  For former would not warrant combined heart + kidney transplant.  Continued volume, hemodynamic optimization with assist device, other.    2.  Proteinuria--standard serologic w/u including renal imaging    discussed with CICU team  Darwin Craig  contact me on TEAMS

## 2023-11-18 NOTE — PROGRESS NOTE ADULT - SUBJECTIVE AND OBJECTIVE BOX
LD VALENCIA  MRN-[id number]  Patient is a 58y old  Male who presents with a chief complaint of CHF (2023 06:48)    HPI:  pt seen and approx 1:30 pm  in [facility]    59yo M w/ hx HTN, CAD w/ 1 stent in , ICH () presenting with abn ekg. Patient presented to [facility] where he was found to have STEMI, recommended to get cath however patient did not want to get it there so it left and came here.  Patient initially had cough, congestion, fever, was placed on antibiotics on .  Started feeling nauseous and had a presyncopal event after which he presented to ED last night.  Had chest pain as well.  Chest pain is midsternal.  Not currently having chest pain.  Received 4 aspirin 30 min pta. (2023 15:11)      Hospital Course:    24 HOUR EVENTS:    REVIEW OF SYSTEMS:    CONSTITUTIONAL: No weakness, fevers or chills  EYES/ENT: No visual changes;  No vertigo or throat pain   NECK: No pain or stiffness  RESPIRATORY: No cough, wheezing, hemoptysis; No shortness of breath  CARDIOVASCULAR: No chest pain or palpitations  GASTROINTESTINAL: No abdominal or epigastric pain. No nausea, vomiting, or hematemesis; No diarrhea or constipation. No melena or hematochezia.  GENITOURINARY: No dysuria, frequency or hematuria  NEUROLOGICAL: No numbness or weakness  SKIN: No itching, rashes      ICU Vital Signs Last 24 Hrs  T(C): 37.1 (2023 19:00), Max: 37.3 (2023 07:00)  T(F): 98.8 (2023 19:00), Max: 99.2 (2023 14:00)  HR: 75 (2023 19:00) (71 - 82)  BP: 97/50 (2023 07:30) (97/50 - 97/50)  BP(mean): 69 (2023 07:30) (69 - 69)  ABP: 110/52 (2023 15:00) (96/47 - 128/53)  ABP(mean): 82 (2023 15:00) (72 - 104)  RR: 20 (2023 19:00) (15 - 26)  SpO2: 96% (2023 19:00) (94% - 99%)    O2 Parameters below as of 2023 19:00  Patient On (Oxygen Delivery Method): room air            CVP(mm Hg): --  CO: --  CI: --  PA: --  PA(mean): --  PA(direct): --  PCWP: --  LA: --  RA: --  SVR: --  SVRI: --  PVR: --  PVRI: --  I&O's Summary    2023 07:01  -  2023 07:00  --------------------------------------------------------  IN: 1028 mL / OUT: 1770 mL / NET: -742 mL    2023 07:01  -  2023 19:39  --------------------------------------------------------  IN: 1264 mL / OUT: 400 mL / NET: 864 mL        CAPILLARY BLOOD GLUCOSE    CAPILLARY BLOOD GLUCOSE      POCT Blood Glucose.: 220 mg/dL (2023 17:00)      PHYSICAL EXAM:  GENERAL: No acute distress, well-developed  HEAD:  Atraumatic, Normocephalic  EYES: EOMI, PERRLA, conjunctiva and sclera clear  NECK: Supple, no lymphadenopathy, no JVD  CHEST/LUNG: CTAB; No wheezes, rales, or rhonchi  HEART: Regular rate and rhythm. Normal S1/S2. No murmurs, rubs, or gallops  ABDOMEN: Soft, non-tender, non-distended; normal bowel sounds, no organomegaly  EXTREMITIES:  2+ peripheral pulses b/l, No clubbing, cyanosis, or edema  NEUROLOGY: A&O x 3, no focal deficits  SKIN: No rashes or lesions    ============================I/O===========================   I&O's Detail    2023 07:01  -  2023 07:00  --------------------------------------------------------  IN:    Heparin: 288 mL    Oral Fluid: 740 mL  Total IN: 1028 mL    OUT:    Voided (mL): 1770 mL  Total OUT: 1770 mL    Total NET: -742 mL      2023 07:01  -  2023 19:39  --------------------------------------------------------  IN:    Heparin: 144 mL    IV PiggyBack: 100 mL    Oral Fluid: 1020 mL  Total IN: 1264 mL    OUT:    Voided (mL): 400 mL  Total OUT: 400 mL    Total NET: 864 mL        ============================ LABS =========================                        11.2   14.05 )-----------( 277      ( 2023 00:24 )             33.6     11-18    130<L>  |  98  |  35<H>  ----------------------------<  147<H>  4.7   |  18<L>  |  1.73<H>    Ca    9.6      2023 13:59  Phos  3.1     -18  Mg     2.2         TPro  7.8  /  Alb  3.6  /  TBili  0.5  /  DBili  x   /  AST  23  /  ALT  45  /  AlkPhos  64  -18                LIVER FUNCTIONS - ( 2023 13:59 )  Alb: 3.6 g/dL / Pro: 7.8 g/dL / ALK PHOS: 64 U/L / ALT: 45 U/L / AST: 23 U/L / GGT: x           PT/INR - ( 2023 00:24 )   PT: 14.1 sec;   INR: 1.29 ratio         PTT - ( 2023 00:24 )  PTT:63.6 sec  ABG - ( 2023 00:13 )  pH, Arterial: 7.40  pH, Blood: x     /  pCO2: 30    /  pO2: 110   / HCO3: 19    / Base Excess: -5.2  /  SaO2: 99.6              Blood Gas Arterial, Lactate: 0.8 mmol/L (23 @ 00:13)  Lactate, Blood: 0.8 mmol/L (23 @ 01:45)  Lactate, Blood: 0.7 mmol/L (23 @ 00:55)    Urinalysis Basic - ( 2023 13:59 )    Color: x / Appearance: x / SG: x / pH: x  Gluc: 147 mg/dL / Ketone: x  / Bili: x / Urobili: x   Blood: x / Protein: x / Nitrite: x   Leuk Esterase: x / RBC: x / WBC x   Sq Epi: x / Non Sq Epi: x / Bacteria: x      ======================Micro/Rad/Cardio=================  Telemtry: Reviewed   EKG: Reviewed  CXR: Reviewed  Culture: Reviewed   Echo:   Cath: Cardiac Cath Lab - Adult:   [facility]  Department of Cardiology  [address] [place] [address]  [phone]  Cath Lab Report -- Comprehensive Report  Patient: LD VALENCIA  Study date: 2017  Account number: [id number]  MR number: [id number]  : 1965  Gender: Male  Race: W  Case Physician(s):  Jairon Holguin M.D.  Referring Physician:  Luc Lynn M.D.  INDICATIONS: Unstable angina - CCS4.  HISTORY: The patient has a history of coronary artery disease. The patient  hashypertension and medication-treated dyslipidemia.  PROCEDURE:  --  Left heart catheterization with ventriculography.  --  Left coronary angiography.  --  Right coronary angiography.  TECHNIQUE: The risks and alternatives of the procedures and conscious  sedation were explained to the patient and informed consent was obtained.  Cardiac catheterization performed electively.  Local anesthetic given. Right radial artery access. A 6FR PRELUDE KIT was  inserted in the vessel. Left heart catheterization. Ventriculography was  performed. A 5FR FR4.0 EXPO catheter was utilized. Left coronary artery  angiography. The vessel was injected utilizing a 5FR FL3.5 EXPO catheter.  Right coronary artery angiography. The vessel was injected utilizing a 5FR  FR4.0 EXPO catheter. RADIATION EXPOSURE: 1.1 min.  CONTRAST GIVEN: Omnipaque 55 ml.  MEDICATIONS GIVEN: Midazolam, 1 mg, IV. Fentanyl, 25 mcg, IV. Verapamil  (Isoptin, Calan, Covera), 2.5 mg, IA. Heparin, 3000 units, IA.  VENTRICLES: Global left ventricular function was moderately depressed. EF  estimated was 40 %.  CORONARY VESSELS: The coronary circulation is right dominant.  LM:   --  LM: Normal.  LAD:   --  Proximal LAD: There was a 50 % stenosis.  CX:   --  Circumflex: Normal.  RCA:   --  Mid RCA: There was a 40 % stenosis.  --  Distal RCA: There was a 50 % stenosis.  COMPLICATIONS: There were no complications.  DIAGNOSTIC RECOMMENDATIONS: The patient should continue with the present  medications.  Prepared and signed by  Jairon Holguin M.D.  Signed 2017 12:20:13  HEMODYNAMIC TABLES  Pressures:  Baseline/ Room Air  Pressures:  - HR: 78  Pressures:  - Rhythm:  Pressures:  -- Aortic Pressure (S/D/M): --/--/99  Pressures:  -- Left Ventricle (s/edp): 157/39/--  Outputs:  Baseline/ Room Air  Outputs:  -- CALCULATIONS[age]  Outputs:  -- CALCULATIONS: Body Surface Area: 2.05  Outputs:  -- CALCULATIONS: Height in cm: 175.00  Outputs:  -- CALCULATIONS: Sex: Male  Outputs:  -- CALCULATIONS: Weight in k.40 (17 @ 21:55)    ======================================================  PAST MEDICAL & SURGICAL HISTORY:  HTN (hypertension)      CAD (coronary artery disease)  ; stent      Intracranial hemorrhage        Respiratory arrest        Myocardial infarction, unspecified MI type, unspecified artery      History of coronary artery stent placement  ====================ASSESSMENT ==============  57 yo male with HTN, CAD (s/p PCI ), HFrEF, CVA , and T2DM presenting with chest pressure and unknown tachycardia that was shocked x1, [facility]  found to have in-stent restenosis of pLAD and  of RCA with elevated RA and PA pressures and severely decreased EF 32%, admitted to CICU for management of cardiogenic shock requiring IABP  -, with hospital course c/b vfib arrest requiring reinsertion of IABP.     Plan:  ====================== NEUROLOGY=====================  # Anxiety  - Ativan 0.5 mg PO Q 6hrs PRN   - No Seroquel/antipsychotics since vfib arrest and prolonged QTC  - Psych Eval last week, recs for continuing Ativan PRN and had recommended SSRI, but pt. refused   - needs psych eval for tx     ==================== RESPIRATORY======================  # Acute Hypoxemic Respiratory Failure  - s/p 2 intubations for cardiogenic pulm edema and the in setting of cardiac arrest, resolved - extubated 11/10  - currently on room air with spO2 mid 90s  - continue incentive spirometry and monitoring of sp02    # Asthma (hx of respiratory failure in 2018- intubated for 20 minutes per chart review pt report)  - c/w albuterol, symbicort and spiriva  - on trelegy at home    ====================CARDIOVASCULAR==================  # Vfib arrest insetting of ischemia vs. shock  - Lido off since    - PO Amio load dose (s/p IV amio ~1950mg IV gtt) for total of 5 grams per EP to complete   - Keep K > 4, Mag > 2.2     # Cardiogenic shock requiring IABP (- , -)  - likely 2/2 NSTEMI and ADHF  -  LHC: pLAD 100 % in-stent restenosis & mRCA, 100 %. PCWP 30. IABP placed.  -  TTE: LV dilated. EF 32 %. Regional WMAs present, mod (grade 2) LV diastolic dysfunction  -  TTE: EF 22% and + LV thrombus   - IABP removed , vfib , IABP later reinserted that day for CS. continue IABP 1:1  - D/C Milrinone gtt 7:30 am   - Currently on hydralazine 100 TID and ISD 40 TID for AL reduction  - Holding diuretics at this time, PRN Bumex IVP as needed  - Continue romel 25 daily and coreg 25 BID for GDMT  - continue to monitor perfusion indices daily  - AT Eval, F/u HF recs    # NSTEMI- Stent re-occlusion of pLAD and 100%  of RCA  - EKG on admission w/LBBB  - DAPT: c/w ASA, Brilinta d/c'd per transplant w/u  - Cont. lipitor 80  - cMR deferred given necessity of IABP  - CT sx not recommending CABG, undergoing AT eval    # LV thrombus  - c/w heparin gtt    ===================HEMATOLOGIC/ONC ===================  - H/H and platelets stable  - continue to monitor daily  # VTE prophylaxis   - c/w heparin gtt given LV Thrombus and IABP     ===================== RENAL =========================  # Non-oliguric ARIC   - sCr uptrending, Baseline Cr: 1-1.22  - Diuresis held, optimize pre-renal factors for optimal perfusion and volume status  - Trend BMP, lytes daily, replace as needed  - Continue Strict I/Os, avoid nephrotoxins    ==================== GASTROINTESTINAL===================  - continue DASH diet  - continue to monitor for BM  GERD  - c/w home protonix    =======================    ENDOCRINE  =====================  Type 2 DM  - A1c 8.3, sugars uncontrolled on admission likely stress induced s/p cardiac arrest  - insulin gtt transitioned off  AM  - continue lantus 8, premeal 8, low ISS    ========================INFECTIOUS DISEASE================  - Afebrile, continue to monitor off abx  - Concern for aspiration event prior to intubation on admission - s/p vanco and cefepime  - BCx 11/10 NGTD, RVP neg, MRSA neg, UA neg  - Mild fluctuations in WBCs w/o fever, f/u blood cultures , UA and UCx    COVID  - Off airborne precautions     Pre-transplant ID w/u   - trend ID recs for serologies   - colonoscopy  - normal   - chest CT  - improved LLL aeration  - f/u abdominal US    LINES:   RIJ [place] -, RAx Jacklyn , LFA IABP  -    Ethics/Dispo: full code, remain in CICU      Patient requires continuous monitoring with bedside rhythm monitoring, pulse ox monitoring, and intermittent blood gas analysis. Care plan discussed with ICU care team. Patient remained critical and at risk for life threatening decompensation.  Patient seen, examined and plan discussed with CCU team during rounds.     I have personally provided ____ minutes of critical care time excluding time spent on separate procedures, in addition to initial critical care time provided by the CICU Attending, Dr. Durand.    By signing my name below, I, Kalyn Sousa, attest that this documentation has been prepared under the direction and in the presence of KARYN Washburn.  Electronically signed: Rosalba Booth, 23 @ 19:39    I, Laura Mcclendon , personally performed the services described in this documentation. all medical record entries made by the scribe were at my direction and in my presence. I have reviewed the chart and agree that the record reflects my personal performance and is accurate and complete  Electronically signed: KARYN Washburn.       LD VALENCIA  MRN-[id number]  Patient is a 58y old  Male who presents with a chief complaint of CHF (2023 06:48)    HPI:  59yo M w/ hx HTN, CAD w/ 1 stent in , ICH () presenting with abn ekg. Patient presented to [facility] where he was found to have STEMI, recommended to get cath however patient did not want to get it there so it left and came here.  Patient initially had cough, congestion, fever, was placed on antibiotics on .  Started feeling nauseous and had a presyncopal event after which he presented to ED last night.  Had chest pain as well.  Chest pain is midsternal.  Not currently having chest pain.  Received 4 aspirin 30 min pta. (2023 15:11)    24 HOUR EVENTS:  - BCx+ enteroccocus, started on Vanc     REVIEW OF SYSTEMS:  CONSTITUTIONAL: No weakness, fevers or chills  EYES/ENT: No visual changes;  No vertigo or throat pain   NECK: No pain or stiffness  RESPIRATORY: No cough, wheezing, hemoptysis; No shortness of breath  CARDIOVASCULAR: No chest pain or palpitations  GASTROINTESTINAL: No abdominal or epigastric pain. No nausea, vomiting, or hematemesis; No diarrhea or constipation. No melena or hematochezia.  GENITOURINARY: No dysuria, frequency or hematuria  NEUROLOGICAL: No numbness or weakness  SKIN: No itching, rashes    ICU Vital Signs Last 24 Hrs  T(C): 37.1 (2023 19:00), Max: 37.3 (2023 07:00)  T(F): 98.8 (2023 19:00), Max: 99.2 (2023 14:00)  HR: 75 (:00) (71 - 82)  BP: 97/50 (2023 07:30) (97/50 - 97/50)  BP(mean): 69 (2023 07:30) (69 - 69)  ABP: 110/52 (2023 15:00) (96/47 - 128/53)  ABP(mean): 82 (2023 15:00) (72 - 104)  RR: 20 (2023 19:00) (15 - 26)  SpO2: 96% (2023 19:00) (94% - 99%)    O2 Parameters below as of 2023 19:00  Patient On (Oxygen Delivery Method): room air    I&O's Summary    2023 07:01  -  2023 07:00  --------------------------------------------------------  IN: 1028 mL / OUT: 1770 mL / NET: -742 mL    2023 07:01  -  2023 19:39  --------------------------------------------------------  IN: 1264 mL / OUT: 400 mL / NET: 864 mL      CAPILLARY BLOOD GLUCOSE  POCT Blood Glucose.: 220 mg/dL (2023 17:00)      PHYSICAL EXAM:  GENERAL: No acute distress, well-developed  CHEST/LUNG: CTAB; No wheezes, rales, or rhonchi  HEART: Regular rate and rhythm. Normal S1/S2. No murmurs, rubs, or gallops  ABDOMEN: Soft, non-tender, non-distended; normal bowel sounds  EXTREMITIES:  2+ peripheral pulses b/l, No clubbing, cyanosis, or edema  NEUROLOGY: A&O x 3, no focal deficits    ============================I/O===========================   I&O's Detail    :  -  2023 07:00  --------------------------------------------------------  IN:    Heparin: 288 mL    Oral Fluid: 740 mL  Total IN: 1028 mL    OUT:    Voided (mL): 1770 mL  Total OUT: 1770 mL    Total NET: -742 mL      2023 07:01  -  2023 19:39  --------------------------------------------------------  IN:    Heparin: 144 mL    IV PiggyBack: 100 mL    Oral Fluid: 1020 mL  Total IN: 1264 mL    OUT:    Voided (mL): 400 mL  Total OUT: 400 mL    Total NET: 864 mL      ============================ LABS =========================                     11.2   14.05 )-----------( 277      ( 2023 00:24 )             33.6         130<L>  |  98  |  35<H>  ----------------------------<  147<H>  4.7   |  18<L>  |  1.73<H>    Ca    9.6      2023 13:59  Phos  3.1       Mg     2.2         TPro  7.8  /  Alb  3.6  /  TBili  0.5  /  DBili  x   /  AST  23  /  ALT  45  /  AlkPhos  64      LIVER FUNCTIONS - ( 2023 13:59 )  Alb: 3.6 g/dL / Pro: 7.8 g/dL / ALK PHOS: 64 U/L / ALT: 45 U/L / AST: 23 U/L / GGT: x           PT/INR - ( 2023 00:24 )   PT: 14.1 sec;   INR: 1.29 ratio         PTT - ( 2023 00:24 )  PTT:63.6 sec  ABG - ( 2023 00:13 )  pH, Arterial: 7.40  pH, Blood: x     /  pCO2: 30    /  pO2: 110   / HCO3: 19    / Base Excess: -5.2  /  SaO2: 99.6      Blood Gas Arterial, Lactate: 0.8 mmol/L (23 @ 00:13)  Lactate, Blood: 0.8 mmol/L (23 @ 01:45)  Lactate, Blood: 0.7 mmol/L (23 @ 00:55)    Urinalysis Basic - ( 2023 13:59 )    Color: x / Appearance: x / SG: x / pH: x  Gluc: 147 mg/dL / Ketone: x  / Bili: x / Urobili: x   Blood: x / Protein: x / Nitrite: x   Leuk Esterase: x / RBC: x / WBC x   Sq Epi: x / Non Sq Epi: x / Bacteria: x    ======================Micro/Rad/Cardio=================  Telemtry: Reviewed   EKG: Reviewed  CXR: Reviewed  Culture: Reviewed   Echo:   Cath: Cardiac Cath Lab - Adult:   [facility]  Department of Cardiology  [address] [place] [address]  [phone]  Cath Lab Report -- Comprehensive Report  Patient: LD VALENCIA  Study date: 2017  Account number: [id number]  MR number: [id number]  : 1965  Gender: Male  Race: W  Case Physician(s):  Jairon Holguin M.D.  Referring Physician:  Luc Lynn M.D.  INDICATIONS: Unstable angina - CCS4.  HISTORY: The patient has a history of coronary artery disease. The patient  hashypertension and medication-treated dyslipidemia.  PROCEDURE:  --  Left heart catheterization with ventriculography.  --  Left coronary angiography.  --  Right coronary angiography.  TECHNIQUE: The risks and alternatives of the procedures and conscious  sedation were explained to the patient and informed consent was obtained.  Cardiac catheterization performed electively.  Local anesthetic given. Right radial artery access. A 6FR PRELUDE KIT was  inserted in the vessel. Left heart catheterization. Ventriculography was  performed. A 5FR FR4.0 EXPO catheter was utilized. Left coronary artery  angiography. The vessel was injected utilizing a 5FR FL3.5 EXPO catheter.  Right coronary artery angiography. The vessel was injected utilizing a 5FR  FR4.0 EXPO catheter. RADIATION EXPOSURE: 1.1 min.  CONTRAST GIVEN: Omnipaque 55 ml.  MEDICATIONS GIVEN: Midazolam, 1 mg, IV. Fentanyl, 25 mcg, IV. Verapamil  (Isoptin, Calan, Covera), 2.5 mg, IA. Heparin, 3000 units, IA.  VENTRICLES: Global left ventricular function was moderately depressed. EF  estimated was 40 %.  CORONARY VESSELS: The coronary circulation is right dominant.  LM:   --  LM: Normal.  LAD:   --  Proximal LAD: There was a 50 % stenosis.  CX:   --  Circumflex: Normal.  RCA:   --  Mid RCA: There was a 40 % stenosis.  --  Distal RCA: There was a 50 % stenosis.  COMPLICATIONS: There were no complications.  DIAGNOSTIC RECOMMENDATIONS: The patient should continue with the present  medications.  Prepared and signed by  Jairon Holguin M.D.  Signed 2017 12:20:13  HEMODYNAMIC TABLES  Pressures:  Baseline/ Room Air  Pressures:  - HR: 78  Pressures:  - Rhythm:  Pressures:  -- Aortic Pressure (S/D/M): --/--/99  Pressures:  -- Left Ventricle (s/edp): 157/39/--  Outputs:  Baseline/ Room Air  Outputs:  -- CALCULATIONS[age]  Outputs:  -- CALCULATIONS: Body Surface Area: 2.05  Outputs:  -- CALCULATIONS: Height in cm: 175.00  Outputs:  -- CALCULATIONS: Sex: Male  Outputs:  -- CALCULATIONS: Weight in k.40 (17 @ 21:55)    ======================================================  PAST MEDICAL & SURGICAL HISTORY:  HTN (hypertension)      CAD (coronary artery disease)  ; stent      Intracranial hemorrhage        Respiratory arrest        Myocardial infarction, unspecified MI type, unspecified artery      History of coronary artery stent placement    ====================ASSESSMENT ==============  58 male with HTN, CAD (s/p PCI ), HFrEF, CVA , and T2DM presenting with chest pressure and unknown tachycardia that was shocked x1, [facility]  found to have in-stent restenosis of pLAD and  of RCA with elevated RA and PA pressures and severely decreased EF 32%, admitted to CICU for management of cardiogenic shock requiring IABP  -, with hospital course c/b vfib arrest requiring reinsertion of IABP.     Plan:  ====================== NEUROLOGY=====================  Anxiety  - Ativan 0.5 mg PO Q 6hrs PRN   - No Seroquel/antipsychotics since vfib arrest and prolonged QTC  - Psych Eval last week, recs for continuing Ativan PRN and had recommended SSRI, but pt. refused   - needs psych eval for tx     ==================== RESPIRATORY======================  Acute Hypoxemic Respiratory Failure  - s/p x2 intubations for cardiogenic pulm edema and the in setting of cardiac arrest, resolved - extubated 11/10  - currently on room air with spO2 mid 90s  - continue incentive spirometry and monitoring of sp02    Asthma  - c/w albuterol, symbicort and spiriva  - on trelegy at home    ====================CARDIOVASCULAR==================  Vfib arrest i/s/o ischemia vs. cardiogenic shock  - Lido gtt off since    - PO Amio load dose (s/p IV amio ~1950mg IV gtt) for total of 5 grams per EP to complete   - Keep K > 4, Mag > 2.2     Cardiogenic shock requiring IABP (- , -)  - likely 2/2 NSTEMI and ADHF  -  LHC: pLAD 100 % in-stent restenosis & mRCA, 100 %. PCWP 30. IABP placed.  -  TTE: LV dilated. EF 32 %. Regional WMAs present, mod (grade 2) LV diastolic dysfunction  -  TTE: EF 22% and + LV thrombus   - IABP removed , vfib , IABP later reinserted that day for CS. continue IABP 1:1  - D/C Milrinone gtt 7:30 am   - Currently on hydralazine 100 TID and ISD 40 TID for AL reduction  - Holding diuretics at this time, PRN Bumex IVP as needed  - Continue romel 25 daily and coreg 25 BID for GDMT  - continue to monitor perfusion indices daily  - AT Eval, F/u HF recs    NSTEMI- Stent re-occlusion of pLAD and 100%  of RCA  - EKG on admission w/LBBB  - DAPT: c/w ASA, Brilinta d/c'd per transplant w/u  - Cont. lipitor 80  - cMR deferred given necessity of IABP  - CT sx not recommending CABG, undergoing AT eval    LV thrombus  - c/w heparin gtt    ===================HEMATOLOGIC/ONC ===================  H/H and platelets stable  - continue to monitor daily  - c/w heparin gtt given LV Thrombus and IABP     ===================== RENAL =========================  Non-oliguric ARIC   - sCr uptrending, Baseline Cr: 1-1.22  - Diuresis held, optimize pre-renal factors for optimal perfusion and volume status  - Trend BMP, lytes daily, replace as needed  - Continue Strict I/Os, avoid nephrotoxins    ==================== GASTROINTESTINAL===================  DASH diet  - continue to monitor for BM    GERD  - c/w home protonix    =======================    ENDOCRINE  =====================  Type 2 DM  - A1c 8.3, sugars uncontrolled on admission likely stress induced s/p cardiac arrest  - insulin gtt transitioned off  AM  - continue lantus 8, premeal 8, low ISS    ========================INFECTIOUS DISEASE================  - Afebrile, continue to monitor off abx  - Concern for aspiration event prior to intubation on admission - s/p vanco and cefepime  - BCx 11/10 NGTD, RVP neg, MRSA neg, UA neg  - Mild fluctuations in WBCs w/o fever, f/u blood cultures , UA and UCx    COVID  - Off airborne precautions     Pre-transplant ID w/u   - trend ID recs for serologies   - colonoscopy  - normal   - chest CT  - improved LLL aeration  - f/u abdominal US    LINES:   RIJ [place] -, RAx Jacklyn , LFA IABP  -    Ethics/Dispo: full code, maintain in CICU      Patient requires continuous monitoring with bedside rhythm monitoring, pulse ox monitoring, and intermittent blood gas analysis. Care plan discussed with ICU care team. Patient remained critical and at risk for life threatening decompensation.  Patient seen, examined and plan discussed with CCU team during rounds.     I have personally provided 35 minutes of critical care time excluding time spent on separate procedures, in addition to initial critical care time provided by the CICU Attending, Dr. Durand.    By signing my name below, I, Kalyn Sousa, attest that this documentation has been prepared under the direction and in the presence of KARYN Washburn.  Electronically signed: Rosalba Booth, 23 @ 19:39    I, Laura Mcclendon , personally performed the services described in this documentation. all medical record entries made by the eileenibe were at my direction and in my presence. I have reviewed the chart and agree that the record reflects my personal performance and is accurate and complete  Electronically signed: KARYN Washburn.       LD VALENCIA  MRN-[id number]  Patient is a 58y old  Male who presents with a chief complaint of CHF (2023 06:48)    HPI:  59yo M w/ hx HTN, CAD w/ 1 stent in , ICH () presenting with abn ekg. Patient presented to [facility] where he was found to have STEMI, recommended to get cath however patient did not want to get it there so it left and came here.  Patient initially had cough, congestion, fever, was placed on antibiotics on .  Started feeling nauseous and had a presyncopal event after which he presented to ED last night.  Had chest pain as well.  Chest pain is midsternal.  Not currently having chest pain.  Received 4 aspirin 30 min pta. (2023 15:11)    24 HOUR EVENTS:  - BCx+ enteroccocus, started on Vanc     REVIEW OF SYSTEMS:  CONSTITUTIONAL: No weakness, fevers or chills  EYES/ENT: No visual changes;  No vertigo or throat pain   NECK: No pain or stiffness  RESPIRATORY: No cough, wheezing, hemoptysis; No shortness of breath  CARDIOVASCULAR: No chest pain or palpitations  GASTROINTESTINAL: No abdominal or epigastric pain. No nausea, vomiting, or hematemesis; No diarrhea or constipation. No melena or hematochezia.  GENITOURINARY: No dysuria, frequency or hematuria  NEUROLOGICAL: No numbness or weakness  SKIN: No itching, rashes    ICU Vital Signs Last 24 Hrs  T(C): 37.1 (2023 19:00), Max: 37.3 (2023 07:00)  T(F): 98.8 (2023 19:00), Max: 99.2 (2023 14:00)  HR: 75 (:00) (71 - 82)  BP: 97/50 (2023 07:30) (97/50 - 97/50)  BP(mean): 69 (2023 07:30) (69 - 69)  ABP: 110/52 (2023 15:00) (96/47 - 128/53)  ABP(mean): 82 (2023 15:00) (72 - 104)  RR: 20 (2023 19:00) (15 - 26)  SpO2: 96% (2023 19:00) (94% - 99%)    O2 Parameters below as of 2023 19:00  Patient On (Oxygen Delivery Method): room air    I&O's Summary    2023 07:01  -  2023 07:00  --------------------------------------------------------  IN: 1028 mL / OUT: 1770 mL / NET: -742 mL    2023 07:01  -  2023 19:39  --------------------------------------------------------  IN: 1264 mL / OUT: 400 mL / NET: 864 mL      CAPILLARY BLOOD GLUCOSE  POCT Blood Glucose.: 220 mg/dL (2023 17:00)      PHYSICAL EXAM:  GENERAL: No acute distress, well-developed  CHEST/LUNG: CTAB; No wheezes, rales, or rhonchi  HEART: Regular rate and rhythm. Normal S1/S2. No murmurs, rubs, or gallops  ABDOMEN: Soft, non-tender, non-distended; normal bowel sounds  EXTREMITIES:  2+ peripheral pulses b/l, No clubbing, cyanosis, or edema  NEUROLOGY: A&O x 3, no focal deficits    ============================I/O===========================   I&O's Detail    :  -  2023 07:00  --------------------------------------------------------  IN:    Heparin: 288 mL    Oral Fluid: 740 mL  Total IN: 1028 mL    OUT:    Voided (mL): 1770 mL  Total OUT: 1770 mL    Total NET: -742 mL      2023 07:01  -  2023 19:39  --------------------------------------------------------  IN:    Heparin: 144 mL    IV PiggyBack: 100 mL    Oral Fluid: 1020 mL  Total IN: 1264 mL    OUT:    Voided (mL): 400 mL  Total OUT: 400 mL    Total NET: 864 mL      ============================ LABS =========================                     11.2   14.05 )-----------( 277      ( 2023 00:24 )             33.6         130<L>  |  98  |  35<H>  ----------------------------<  147<H>  4.7   |  18<L>  |  1.73<H>    Ca    9.6      2023 13:59  Phos  3.1       Mg     2.2         TPro  7.8  /  Alb  3.6  /  TBili  0.5  /  DBili  x   /  AST  23  /  ALT  45  /  AlkPhos  64      LIVER FUNCTIONS - ( 2023 13:59 )  Alb: 3.6 g/dL / Pro: 7.8 g/dL / ALK PHOS: 64 U/L / ALT: 45 U/L / AST: 23 U/L / GGT: x           PT/INR - ( 2023 00:24 )   PT: 14.1 sec;   INR: 1.29 ratio         PTT - ( 2023 00:24 )  PTT:63.6 sec  ABG - ( 2023 00:13 )  pH, Arterial: 7.40  pH, Blood: x     /  pCO2: 30    /  pO2: 110   / HCO3: 19    / Base Excess: -5.2  /  SaO2: 99.6      Blood Gas Arterial, Lactate: 0.8 mmol/L (23 @ 00:13)  Lactate, Blood: 0.8 mmol/L (23 @ 01:45)  Lactate, Blood: 0.7 mmol/L (23 @ 00:55)    Urinalysis Basic - ( 2023 13:59 )    Color: x / Appearance: x / SG: x / pH: x  Gluc: 147 mg/dL / Ketone: x  / Bili: x / Urobili: x   Blood: x / Protein: x / Nitrite: x   Leuk Esterase: x / RBC: x / WBC x   Sq Epi: x / Non Sq Epi: x / Bacteria: x    ======================Micro/Rad/Cardio=================  Telemtry: Reviewed   EKG: Reviewed  CXR: Reviewed  Culture: Reviewed   Echo:   Cath: Cardiac Cath Lab - Adult:   [facility]  Department of Cardiology  [address] [place] [address]  [phone]  Cath Lab Report -- Comprehensive Report  Patient: LD VALENCIA  Study date: 2017  Account number: [id number]  MR number: [id number]  : 1965  Gender: Male  Race: W  Case Physician(s):  Jairon Hogluin M.D.  Referring Physician:  Luc Lynn M.D.  INDICATIONS: Unstable angina - CCS4.  HISTORY: The patient has a history of coronary artery disease. The patient  hashypertension and medication-treated dyslipidemia.  PROCEDURE:  --  Left heart catheterization with ventriculography.  --  Left coronary angiography.  --  Right coronary angiography.  TECHNIQUE: The risks and alternatives of the procedures and conscious  sedation were explained to the patient and informed consent was obtained.  Cardiac catheterization performed electively.  Local anesthetic given. Right radial artery access. A 6FR PRELUDE KIT was  inserted in the vessel. Left heart catheterization. Ventriculography was  performed. A 5FR FR4.0 EXPO catheter was utilized. Left coronary artery  angiography. The vessel was injected utilizing a 5FR FL3.5 EXPO catheter.  Right coronary artery angiography. The vessel was injected utilizing a 5FR  FR4.0 EXPO catheter. RADIATION EXPOSURE: 1.1 min.  CONTRAST GIVEN: Omnipaque 55 ml.  MEDICATIONS GIVEN: Midazolam, 1 mg, IV. Fentanyl, 25 mcg, IV. Verapamil  (Isoptin, Calan, Covera), 2.5 mg, IA. Heparin, 3000 units, IA.  VENTRICLES: Global left ventricular function was moderately depressed. EF  estimated was 40 %.  CORONARY VESSELS: The coronary circulation is right dominant.  LM:   --  LM: Normal.  LAD:   --  Proximal LAD: There was a 50 % stenosis.  CX:   --  Circumflex: Normal.  RCA:   --  Mid RCA: There was a 40 % stenosis.  --  Distal RCA: There was a 50 % stenosis.  COMPLICATIONS: There were no complications.  DIAGNOSTIC RECOMMENDATIONS: The patient should continue with the present  medications.  Prepared and signed by  Jairon Holguin M.D.  Signed 2017 12:20:13  HEMODYNAMIC TABLES  Pressures:  Baseline/ Room Air  Pressures:  - HR: 78  Pressures:  - Rhythm:  Pressures:  -- Aortic Pressure (S/D/M): --/--/99  Pressures:  -- Left Ventricle (s/edp): 157/39/--  Outputs:  Baseline/ Room Air  Outputs:  -- CALCULATIONS[age]  Outputs:  -- CALCULATIONS: Body Surface Area: 2.05  Outputs:  -- CALCULATIONS: Height in cm: 175.00  Outputs:  -- CALCULATIONS: Sex: Male  Outputs:  -- CALCULATIONS: Weight in k.40 (17 @ 21:55)    ======================================================  PAST MEDICAL & SURGICAL HISTORY:  HTN (hypertension)      CAD (coronary artery disease)  ; stent      Intracranial hemorrhage        Respiratory arrest        Myocardial infarction, unspecified MI type, unspecified artery      History of coronary artery stent placement    ====================ASSESSMENT ==============  58 male with HTN, CAD (s/p PCI ), HFrEF, CVA , and T2DM presenting with chest pressure and unknown tachycardia that was shocked x1, [facility]  found to have in-stent restenosis of pLAD and  of RCA with elevated RA and PA pressures and severely decreased EF 32%, admitted to CICU for management of cardiogenic shock requiring IABP  -, with hospital course c/b vfib arrest requiring reinsertion of IABP.     Plan:  ====================== NEUROLOGY=====================  Anxiety  - Ativan 0.5 mg PO Q 6hrs PRN   - No Seroquel/antipsychotics since vfib arrest and prolonged QTC  - Psych Eval last week, recs for continuing Ativan PRN and had recommended SSRI, but pt. refused   - needs psych eval for tx     ==================== RESPIRATORY======================  Acute Hypoxemic Respiratory Failure  - s/p x2 intubations for cardiogenic pulm edema and the in setting of cardiac arrest, resolved - extubated 11/10  - currently on room air with spO2 mid 90s  - continue incentive spirometry and monitoring of sp02    Asthma  - c/w albuterol, symbicort and spiriva  - on trelegy at home    ====================CARDIOVASCULAR==================  Vfib arrest i/s/o ischemia vs. cardiogenic shock  - Lido gtt off since    - PO Amio load dose (s/p IV amio ~1950mg IV gtt) for total of 5 grams per EP to complete   - Keep K > 4, Mag > 2.2     Cardiogenic shock requiring IABP (- , -)  - likely 2/2 NSTEMI and ADHF  -  LHC: pLAD 100 % in-stent restenosis & mRCA, 100 %. PCWP 30. IABP placed.  -  TTE: LV dilated. EF 32 %. Regional WMAs present, mod (grade 2) LV diastolic dysfunction  -  TTE: EF 22% and + LV thrombus   - IABP removed , vfib , IABP later reinserted that day for CS. continue IABP 1:1  - D/C Milrinone gtt 7:30 am   - Currently on hydralazine 100 TID and ISD 40 TID for AL reduction  - Holding diuretics at this time, PRN Bumex IVP as needed  - Continue romel 25 daily and coreg 25 BID for GDMT  - continue to monitor perfusion indices daily  - AT Eval, F/u HF recs    NSTEMI- Stent re-occlusion of pLAD and 100%  of RCA  - EKG on admission w/LBBB  - DAPT: c/w ASA, Brilinta d/c'd per transplant w/u  - Cont. lipitor 80  - cMR deferred given necessity of IABP  - CT sx not recommending CABG, undergoing AT eval    LV thrombus  - c/w heparin gtt    ===================HEMATOLOGIC/ONC ===================  H/H and platelets stable  - continue to monitor daily  - c/w heparin gtt given LV Thrombus and IABP     ===================== RENAL =========================  Non-oliguric ARIC   - sCr uptrending, Baseline Cr: 1-1.22  - Diuresis held, optimize pre-renal factors for optimal perfusion and volume status  - Trend BMP, lytes daily, replace as needed  - Continue Strict I/Os, avoid nephrotoxins    ==================== GASTROINTESTINAL===================  DASH diet  - continue to monitor for BM    GERD  - c/w home protonix    =======================    ENDOCRINE  =====================  Type 2 DM  - A1c 8.3, sugars uncontrolled on admission likely stress induced s/p cardiac arrest  - insulin gtt transitioned off  AM  - continue lantus 8, premeal 8, low ISS    ========================INFECTIOUS DISEASE================  Enterococcus faecalis bacteremia  - Concern for aspiration event prior to intubation on admission - s/p vanco and cefepime course  - BCx + for enterococcus, started on Vanc (by level)   - BCx 11/10 NGTD, RVP neg, MRSA neg, UA neg  - plan for IABP swap once cultures clear  - Mild fluctuations in WBCs w/o fever, f/u blood cultures , UA and UCx    COVID  - Off airborne precautions     Pre-transplant ID w/u   - trend ID recs for serologies   - colonoscopy  - normal   - chest CT  - improved LLL aeration  - f/u abdominal US    LINES:   RIJ Zearing -, RAx Jacklyn , LFA IABP  -    Ethics/Dispo: full code, maintain in CICU      Patient requires continuous monitoring with bedside rhythm monitoring, pulse ox monitoring, and intermittent blood gas analysis. Care plan discussed with ICU care team. Patient remained critical and at risk for life threatening decompensation.  Patient seen, examined and plan discussed with CCU team during rounds.     I have personally provided 35 minutes of critical care time excluding time spent on separate procedures, in addition to initial critical care time provided by the CICU Attending, Dr. Durand.    By signing my name below, I, Kalyn Sousa, attest that this documentation has been prepared under the direction and in the presence of KARYN Washburn.  Electronically signed: Sangita Booth, 23 @ 19:39    I, Laura Mcclendon , personally performed the services described in this documentation. all medical record entries made by the sangita were at my direction and in my presence. I have reviewed the chart and agree that the record reflects my personal performance and is accurate and complete  Electronically signed: KARYN Washburn.

## 2023-11-18 NOTE — PROGRESS NOTE ADULT - CRITICAL CARE ATTENDING COMMENT
History of anxiety disorder and CAD s/p PCI  Admitted with cardiogenic shock and respiratory failure in the setting of stent restenosis  Transferred out and had VT/VF cardiac arrest  Intubated during arrest, ROSC achieved, readmitted to CICU  A+Ox3, anxious - continue Ativan 0.5 q6h  Cardiogenic shock on IABP, off milrinone   Maintain IABP and afterload reduction  VT/VF arrest, on Amiodarone PO    Continue with ASA  SpO2 mid 90s on RA  DASH diabetic diet   Non-oliguric ARIC that has been improving, now with increased creat, will repeat BMP pm   H/H low but acceptable on Heparin drip for LV thrombus  Afebrile, + new cx, will d/w transplant ID   Sugars overall controlled on Lantus  LFA IABPrangel 11/9.

## 2023-11-18 NOTE — PROGRESS NOTE ADULT - ASSESSMENT
59 yo M with HFrEF (LVIDd 6.4 cm, LVEF 32%), CAD s/p PCI (2008), HTN, DMT2 (A1c 8.3%) and CVA s/p TPA (2018), recently treated for PNA who initially presented to [facility] via EMS after syncope reportedly requiring defibrilation. Treated for ACS but left AMA to come to [facility]. On arrival ECG with ST depression in lateral leads. Intubated 11/1 for respiratory failure. Found to have COVID. L/RHC 11/1revealing  of LAD and RCA, elevated filling pressures and CI of 1.5 prompting placement of IABP. Extubated 11/3. IABP was weaned to off 11/7, then the following day on 11/8, developed PMVT cardiac arrest with prompt CPR and defibrillation, started on IV Lidocaine and Amio. IABP ultimately replaced on 11/9 for worsening hypotension. TTE 11/11 revealing LV thrombus.     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 AT evaluation launched 11/10, he is ABO A. Currently well supported on IABP at 1:1 without further arrhythmias.      Cardiac Studies  11/1123 TTE: LVEF 22% (global), LV thrombus, normal RV size and function, mild MR, trace TR  11/1/23  LHC showed pLAD 70 % stenosis in the ostium portion of the segment and 100 % in-stent restenosis and in mRCA, 100 % stenosis.   11/1/23 RHC; RA 23 Vw 24, PA 52/33/40, PCWP 30 Vw 33, AO 85/66/73, PA sat 54.4%, CO/CI (F) 2.96/1.44, IABP was placed during the cath through R common femoral artery.   11/1/23 TTE: LVIDd 6.4cm , LVEF 32%, regional WMA, grade II DD,  TAPSE 1.7cm, mld MR, trace TR,     Bedside hemodynamics  11/3: IABP 1:1 RA 5; PA 27/12/18; CO/CI 5.6/2.6; MAP 90-100s.  11/4/23 IABP 1:3 CVP 4 PA 37/18 SvO2 83.8 CO/CI 11.2 CI 5.1  (in the setting of fever to 38.3C)  11/5 IABP 1:1 CVP 3 PA 48/27 SvO2 78.4% CO 8.2 CI 3.7   11/1 (IABP 1:1): CVP 1, PA 33/5/16, MvO2 74.3%   59 yo M with HFrEF (LVIDd 6.4 cm, LVEF 32%), CAD s/p PCI (2008), HTN, DMT2 (A1c 8.3%) and CVA s/p TPA (2018), recently treated for PNA who initially presented to [facility] via EMS after syncope reportedly requiring defibrilation. Treated for ACS but left AMA to come to [facility]. On arrival ECG with ST depression in lateral leads. Intubated 11/1 for respiratory failure. Found to have COVID. L/RHC 11/1revealing  of LAD and RCA, elevated filling pressures and CI of 1.5 prompting placement of IABP. Extubated 11/3. IABP was weaned to off 11/7, then the following day on 11/8, developed PMVT cardiac arrest with prompt CPR and defibrillation, started on IV Lidocaine and Amio. IABP ultimately replaced on 11/9 for worsening hypotension. TTE 11/11 revealing LV thrombus.     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 AT evaluation launched 11/10, he is ABO A. Currently well supported on IABP at 1:1 without further arrhythmias.  He was discussed during TSC on 11/16 and was approved for listing PENDING hepatology clearance, colonoscopy and repeat chest CT to assess PNA. He now presents leukocytosis and worsening kidney function.     Cardiac Studies  11/1123 TTE: LVEF 22% (global), LV thrombus, normal RV size and function, mild MR, trace TR  11/1/23  LHC showed pLAD 70 % stenosis in the ostium portion of the segment and 100 % in-stent restenosis and in mRCA, 100 % stenosis.   11/1/23 RHC; RA 23 Vw 24, PA 52/33/40, PCWP 30 Vw 33, AO 85/66/73, PA sat 54.4%, CO/CI (F) 2.96/1.44, IABP was placed during the cath through R common femoral artery.   11/1/23 TTE: LVIDd 6.4cm , LVEF 32%, regional WMA, grade II DD,  TAPSE 1.7cm, mld MR, trace TR,     Bedside hemodynamics  11/3: IABP 1:1 RA 5; PA 27/12/18; CO/CI 5.6/2.6; MAP 90-100s.  11/4/23 IABP 1:3 CVP 4 PA 37/18 SvO2 83.8 CO/CI 11.2 CI 5.1  (in the setting of fever to 38.3C)  11/5 IABP 1:1 CVP 3 PA 48/27 SvO2 78.4% CO 8.2 CI 3.7   11/1 (IABP 1:1): CVP 1, PA 33/5/16, MvO2 74.3%

## 2023-11-18 NOTE — PROGRESS NOTE ADULT - ASSESSMENT
57 yo male h/o htn, cad s/p pci, ICH, here with NSTEMI  s/p intubation and cath. now in CCU    NSTEMI  s/p  cath  cath results noted. multi vessel dz., CTSx f/u.   hep gtt  pt with vtach/fib arrest again on 11/8. s/p ACLS and ROSC.   now extubated  IABP in place  mngt as per CCU  plan for transplant vs LVAD as per HF and transplant team  transplant w/u ongoing.   s/p colonoscopy 11/17. normal    LV thrombus   hep gtt    acute on chronic systolic heart failure  heart failure team f/u      pna  recently diagnosed outpt  iv abx now stopped  f/u cult   id following     covid  supportive care    h/o ICH  resolved    agitation  psy consult f/u    mngt as per CCU team  d/w CCU attn         Advanced care planning was discussed with patient and family.  Advanced care planning forms were reviewed and discussed as appropriate.  Differential diagnosis and plan of care discussed with patient after the evaluation.   Pain assessed and judicious use of narcotics when appropriate was discussed.  Importance of Fall prevention discussed.  Counseling on Smoking and Alcohol cessation was offered when appropriate.  Counseling on Diet, exercise, and medication compliance was done.       Approx 75 minutes spent.

## 2023-11-18 NOTE — CONSULT NOTE ADULT - ASSESSMENT
58 yrs old male w/ hx of HFrEF (LVIDd 6.4 cm, LVEF 32%), CAD s/p PCI (2008), HTN, DMT2 (A1c 8.3%) and CVA s/p TPA (2018), recently treated for PNA who initially presented to [facility] via EMS after syncope reportedly requiring defibrilation. Treated for ACS but left AMA to come to [facility]. Pt has covid and was placed on IABP for hypotension. Now being evaluated for Heart transplant Vs LVAD placement. ARIC after the admission that has gradually worsened and plateaued on 11/10 and then gradually improved to 1.8 today. Not fluid overloaded. Electrolytes are with in normal limits.

## 2023-11-18 NOTE — PROGRESS NOTE ADULT - ASSESSMENT
59 yo M with PMHx of HTN, CAD w/ 1 stent in 2009, ICH (2008) presented on 11/1 with abn ekg. Patient presented to [facility] where he was found to have STEMI, recommended to get cath however patient did not want to get it there so he left and came here.   EKG here with ST depression in lateral leads and elevation in anterior leads   Prior to C found to be tachycardic, dyspneic, intubated   C 11/1 with chronic total occlusion of LAD and RCA, with elevated RA and PA pressures  TTE 11/1 with severely decreased EF 32%, s/p IABP 11/1    RVP (11/1) Positive for COVID19  RVP (11/10) Negative  BCx (11/2, 11/4) NGTD  ETT Culture (11/2) NGTD  MRSA/MSSA PCR (11/2, 11/10) Negative  UA (11/10) 1 WBC    CXR (11/10) Clear Lungs  TTE (11/2) Left ventricular thrombus.    Antibiotic Course:  Vancomycin: 11/2  Cefepime: 11/2    #Enterococcus Bacteremia, Leukocytosis, Pre-Heart Transplant Evaluation Serologies  Concern for either central line or abdominal source  --Recommend 2x repeat blood cultures  --Recommend starting Vancomycin (will further clarify penicillin allergy tomorrow)  --Recommend CT A/P noncontrast  --Will need balloon pump exchange but may make sense to see how high grade his bacteremia is and try to clear his cultures first so we dont seed the new one.  --Continue to follow CBC with diff  --Continue to follow temperature curve  --Follow up on susceptibility of Enterococcus faecalis    #COVID19  RVP (11/1) Positive for COVID19  RVP (11/10) Negative  Did not receive therapy for COVID19  Low suspicion that hypoxia currently is secondary to COVID19 (favor cardiogenic causes)  s/p isolation for COVID19    #Encounter to Vaccinate Patient  COVID19: COVID19 Infection This Admission. Would consider Vaccination in ~3 months  Influenza: Will require  Pneumococcal: Would benefit from PCV20  HAV: Will require Havrix  HBV: Will require Heplisav  MMR: Not Mumps Immune, if >1 month until transplant would consider MMR dose  Varicella: Immune  Shingles: Will require Shingrix  Tdap: Will require Tdap    I will continue to follow. Please feel free to contact me with any further questions.    Silviano Manuel M.D.  [facility] Division of Infectious Disease  8AM-5PM Monday - Friday: Available on [employer] Teams  After Hours and Holidays (or if no response on [employer] Teams): Please contact the Infectious Diseases Office at [phone]    The above assessment and plan were discussed with Dr Chew () and [facility]

## 2023-11-18 NOTE — PROGRESS NOTE ADULT - SUBJECTIVE AND OBJECTIVE BOX
PATIENT:  LD VALENCIA  [id number]    CHIEF COMPLAINT:  Patient is a 58y old  Male who presents with a chief complaint of advanced cardiac work up for CHF (2023 17:09)      INTERVAL HISTORY/OVERNIGHT EVENTS:      REVIEW OF SYSTEMS:    Constitutional:     [ ] negative [ ] fevers [ ] chills [ ] weight loss [ ] weight gain  HEENT:                  [ ] negative [ ] dry eyes [ ] eye irritation [ ] postnasal drip [ ] nasal congestion  CV:                         [ ] negative  [ ] chest pain [ ] orthopnea [ ] palpitations [ ] murmur  Resp:                     [ ] negative [ ] cough [ ] shortness of breath [ ] dyspnea [ ] wheezing [ ] sputum [ ] hemoptysis  GI:                          [ ] negative [ ] nausea [ ] vomiting [ ] diarrhea [ ] constipation [ ] abd pain [ ] dysphagia   :                        [ ] negative [ ] dysuria [ ] nocturia [ ] hematuria [ ] increased urinary frequency  Musculoskeletal: [ ] negative [ ] back pain [ ] myalgias [ ] arthralgias [ ] fracture  Skin:                       [ ] negative [ ] rash [ ] itch  Neurological:        [ ] negative [ ] headache [ ] dizziness [ ] syncope [ ] weakness [ ] numbness  Psychiatric:           [ ] negative [ ] anxiety [ ] depression  Endocrine:            [ ] negative [ ] diabetes [ ] thyroid problem  Heme/Lymph:      [ ] negative [ ] anemia [ ] bleeding problem  Allergic/Immune: [ ] negative [ ] itchy eyes [ ] nasal discharge [ ] hives [ ] angioedema    [ ] All other systems negative  [ ] Unable to assess ROS because ________.    MEDICATIONS:  MEDICATIONS  (STANDING):  aMIOdarone    Tablet 200 milliGRAM(s) Oral daily  aMIOdarone    Tablet   Oral   aspirin  chewable 81 milliGRAM(s) Oral daily  atorvastatin 80 milliGRAM(s) Oral at bedtime  budesonide  80 MICROgram(s)/formoterol 4.5 MICROgram(s) Inhaler 2 Puff(s) Inhalation two times a day  carvedilol 25 milliGRAM(s) Oral every 12 hours  chlorhexidine 2% Cloths 1 Application(s) Topical daily  dextrose 50% Injectable 25 Gram(s) IV Push once  dextrose 50% Injectable 12.5 Gram(s) IV Push once  heparin  Infusion 1600 Unit(s)/Hr (12 mL/Hr) IV Continuous <Continuous>  hydrALAZINE 100 milliGRAM(s) Oral three times a day  insulin glargine Injectable (LANTUS) 8 Unit(s) SubCutaneous at bedtime  insulin lispro (ADMELOG) corrective regimen sliding scale   SubCutaneous three times a day before meals  insulin lispro (ADMELOG) corrective regimen sliding scale   SubCutaneous at bedtime  insulin lispro Injectable (ADMELOG) 8 Unit(s) SubCutaneous three times a day before meals  isosorbide   dinitrate Tablet (ISORDIL) 40 milliGRAM(s) Oral three times a day  spironolactone 25 milliGRAM(s) Oral daily    MEDICATIONS  (PRN):  LORazepam     Tablet 0.5 milliGRAM(s) Oral every 6 hours PRN Anxiety      ALLERGIES:  Allergies    penicillins (Unknown)    Intolerances        OBJECTIVE:  ICU Vital Signs Last 24 Hrs  T(C): 37.1 (2023 04:00), Max: 37.3 (2023 19:00)  T(F): 98.8 (2023 04:00), Max: 99.1 (2023 19:00)  HR: 78 (2023 06:00) (71 - 87)  BP: 108/58 (2023 10:03) (108/58 - 108/58)  BP(mean): 75 (2023 10:03) (75 - 75)  ABP: 115/50 (2023 06:00) (96/47 - 136/73)  ABP(mean): 89 (2023 06:00) (72 - 108)  RR: 21 (2023 06:00) (17 - 26)  SpO2: 96% (2023 06:00) (94% - 100%)    O2 Parameters below as of 2023 06:00  Patient On (Oxygen Delivery Method): room air            Adult Advanced Hemodynamics Last 24 Hrs  CVP(mm Hg): --  CVP(cm H2O): --  CO: --  CI: --  PA: --  PA(mean): --  PCWP: --  SVR: --  SVRI: --  PVR: --  PVRI: --  CAPILLARY BLOOD GLUCOSE      POCT Blood Glucose.: 158 mg/dL (2023 21:49)  POCT Blood Glucose.: 249 mg/dL (2023 16:34)  POCT Blood Glucose.: 137 mg/dL (2023 11:17)  POCT Blood Glucose.: 146 mg/dL (2023 07:42)    CAPILLARY BLOOD GLUCOSE      POCT Blood Glucose.: 158 mg/dL (2023 21:49)    I&O's Summary    2023 07:01  -  2023 07:00  --------------------------------------------------------  IN: 868 mL / OUT: 1325 mL / NET: -457 mL    2023 07:01  -  2023 06:49  --------------------------------------------------------  IN: 1016 mL / OUT: 1770 mL / NET: -754 mL      Daily Height in cm: 175.26 (2023 10:03)    Daily Weight in k.5 (2023 05:00)    PHYSICAL EXAMINATION:  General: WN/WD NAD  HEENT: PERRLA, EOMI, moist mucous membranes  Neurology: A&Ox3, nonfocal, MOISE x 4  Respiratory: CTA B/L, normal respiratory effort, no wheezes, crackles, rales  CV: RRR, S1S2, no murmurs, rubs or gallops  Abdominal: Soft, NT, ND +BS, Last BM  Extremities: No edema, + peripheral pulses  Incisions:   Tubes:    LABS:  ABG - ( 2023 00:13 )  pH, Arterial: 7.40  pH, Blood: x     /  pCO2: 30    /  pO2: 110   / HCO3: 19    / Base Excess: -5.2  /  SaO2: 99.6                                    11.2   14.05 )-----------( 277      ( 2023 00:24 )             33.6     11-18    129<L>  |  100  |  35<H>  ----------------------------<  160<H>  4.5   |  19<L>  |  1.84<H>    Ca    9.5      2023 00:24  Phos  3.5     11-  Mg     1.9     -    TPro  7.2  /  Alb  3.5  /  TBili  0.4  /  DBili  x   /  AST  22  /  ALT  49<H>  /  AlkPhos  62  -    LIVER FUNCTIONS - ( 2023 00:24 )  Alb: 3.5 g/dL / Pro: 7.2 g/dL / ALK PHOS: 62 U/L / ALT: 49 U/L / AST: 22 U/L / GGT: x           PT/INR - ( 2023 00:24 )   PT: 14.1 sec;   INR: 1.29 ratio         PTT - ( 2023 00:24 )  PTT:63.6 sec        Urinalysis Basic - ( 2023 00:24 )    Color: x / Appearance: x / SG: x / pH: x  Gluc: 160 mg/dL / Ketone: x  / Bili: x / Urobili: x   Blood: x / Protein: x / Nitrite: x   Leuk Esterase: x / RBC: x / WBC x   Sq Epi: x / Non Sq Epi: x / Bacteria: x        TELEMETRY:     EKG:     IMAGING:       PATIENT:  LD VALENCIA  [id number]    CHIEF COMPLAINT:  Patient is a 58y old  Male who presents with a chief complaint of advanced cardiac work up for CHF (2023 17:09)      INTERVAL HISTORY/OVERNIGHT EVENTS: no acute overnight events.       REVIEW OF SYSTEMS:  Constitutional:     [x ] negative [ ] fevers [ ] chills [ ] weight loss [ ] weight gain  HEENT:                  [x ] negative [ ] dry eyes [ ] eye irritation [ ] postnasal drip [ ] nasal congestion  CV:                         [ x] negative  [ ] chest pain [ ] orthopnea [ ] palpitations [ ] murmur  Resp:                     [ x] negative [ ] cough [ ] shortness of breath [ ] dyspnea [ ] wheezing [ ] sputum [ ] hemoptysis  GI:                          [x ] negative [ ] nausea [ ] vomiting [ ] diarrhea [ ] constipation [ ] abd pain [ ] dysphagia   :                        [x ] negative [ ] dysuria [ ] nocturia [ ] hematuria [ ] increased urinary frequency  Musculoskeletal: [x ] negative [ ] back pain [ ] myalgias [ ] arthralgias [ ] fracture  Skin:                       [ x] negative [ ] rash [ ] itch  Neurological:        [x ] negative [ ] headache [ ] dizziness [ ] syncope [ ] weakness [ ] numbness    [ ] All other systems negative  [ ] Unable to assess ROS because ________.    MEDICATIONS:  MEDICATIONS  (STANDING):  aMIOdarone    Tablet 200 milliGRAM(s) Oral daily  aMIOdarone    Tablet   Oral   aspirin  chewable 81 milliGRAM(s) Oral daily  atorvastatin 80 milliGRAM(s) Oral at bedtime  budesonide  80 MICROgram(s)/formoterol 4.5 MICROgram(s) Inhaler 2 Puff(s) Inhalation two times a day  carvedilol 25 milliGRAM(s) Oral every 12 hours  chlorhexidine 2% Cloths 1 Application(s) Topical daily  dextrose 50% Injectable 25 Gram(s) IV Push once  dextrose 50% Injectable 12.5 Gram(s) IV Push once  heparin  Infusion 1600 Unit(s)/Hr (12 mL/Hr) IV Continuous <Continuous>  hydrALAZINE 100 milliGRAM(s) Oral three times a day  insulin glargine Injectable (LANTUS) 8 Unit(s) SubCutaneous at bedtime  insulin lispro (ADMELOG) corrective regimen sliding scale   SubCutaneous three times a day before meals  insulin lispro (ADMELOG) corrective regimen sliding scale   SubCutaneous at bedtime  insulin lispro Injectable (ADMELOG) 8 Unit(s) SubCutaneous three times a day before meals  isosorbide   dinitrate Tablet (ISORDIL) 40 milliGRAM(s) Oral three times a day  spironolactone 25 milliGRAM(s) Oral daily    MEDICATIONS  (PRN):  LORazepam     Tablet 0.5 milliGRAM(s) Oral every 6 hours PRN Anxiety      ALLERGIES:  Allergies    penicillins (Unknown)    Intolerances        OBJECTIVE:  ICU Vital Signs Last 24 Hrs  T(C): 37.1 (2023 04:00), Max: 37.3 (2023 19:00)  T(F): 98.8 (2023 04:00), Max: 99.1 (2023 19:00)  HR: 78 (2023 06:00) (71 - 87)  BP: 108/58 (2023 10:03) (108/58 - 108/58)  BP(mean): 75 (2023 10:03) (75 - 75)  ABP: 115/50 (2023 06:00) (96/47 - 136/73)  ABP(mean): 89 (2023 06:00) (72 - 108)  RR: 21 (2023 06:00) (17 - 26)  SpO2: 96% (2023 06:00) (94% - 100%)    O2 Parameters below as of 2023 06:00  Patient On (Oxygen Delivery Method): room air      POCT Blood Glucose.: 158 mg/dL (2023 21:49)  POCT Blood Glucose.: 249 mg/dL (2023 16:34)  POCT Blood Glucose.: 137 mg/dL (2023 11:17)  POCT Blood Glucose.: 146 mg/dL (2023 07:42)    CAPILLARY BLOOD GLUCOSE      POCT Blood Glucose.: 158 mg/dL (2023 21:49)    I&O's Summary    2023 07:01  -  2023 07:00  --------------------------------------------------------  IN: 868 mL / OUT: 1325 mL / NET: -457 mL    2023 07:01  -  2023 06:49  --------------------------------------------------------  IN: 1016 mL / OUT: 1770 mL / NET: -754 mL      Daily Height in cm: 175.26 (2023 10:03)    Daily Weight in k.5 (2023 05:00)    PHYSICAL EXAMINATION:  General: in no acute distress  HEENT: PERRLA, moist mucous membranes  Neurology: A&Ox3, nonfocal, MOISE x 4  Respiratory: CTA B/L, normal respiratory effort, no wheezes, crackles, rales  CV: RRR, S1S2, no murmurs, rubs or gallops  Abdominal: Soft, NT, ND +BS  Extremities: No edema, + palpable DP and radial pulses   Skin: warm and dry  Lines: L femoral IABP dressing clean, dry and intact    LABS:  ABG - ( 2023 00:13 )  pH, Arterial: 7.40  pH, Blood: x     /  pCO2: 30    /  pO2: 110   / HCO3: 19    / Base Excess: -5.2  /  SaO2: 99.6                                    11.2   14.05 )-----------( 277      ( 2023 00:24 )             33.6     11-18    129<L>  |  100  |  35<H>  ----------------------------<  160<H>  4.5   |  19<L>  |  1.84<H>    Ca    9.5      2023 00:24  Phos  3.5     11-18  Mg     1.9     11-18    TPro  7.2  /  Alb  3.5  /  TBili  0.4  /  DBili  x   /  AST  22  /  ALT  49<H>  /  AlkPhos  62  11-18    LIVER FUNCTIONS - ( 2023 00:24 )  Alb: 3.5 g/dL / Pro: 7.2 g/dL / ALK PHOS: 62 U/L / ALT: 49 U/L / AST: 22 U/L / GGT: x           PT/INR - ( 2023 00:24 )   PT: 14.1 sec;   INR: 1.29 ratio         PTT - ( 2023 00:24 )  PTT:63.6 sec        Urinalysis Basic - ( 2023 00:24 )    Color: x / Appearance: x / SG: x / pH: x  Gluc: 160 mg/dL / Ketone: x  / Bili: x / Urobili: x   Blood: x / Protein: x / Nitrite: x   Leuk Esterase: x / RBC: x / WBC x   Sq Epi: x / Non Sq Epi: x / Bacteria: x        TELEMETRY: reviewed    EKG: reviewed    IMAGING: reviewed

## 2023-11-18 NOTE — PROGRESS NOTE ADULT - PROBLEM SELECTOR PLAN 4
- Cr on admission 1.2, peaked to 4  - Improving with above optimization, now .7  - Support as above. - Cr on admission 1.2, peaked to 4  - Worse today - ?colonoscopy prep. Will repeat this afternoon and ask cardiorenal to consult as pt also has DM  - Support as above.

## 2023-11-18 NOTE — CONSULT NOTE ADULT - SUBJECTIVE AND OBJECTIVE BOX
CHIEF COMPLAINT:  cough     HPI:    Martin Hardy is a  59 yo male with HTN, CAD (s/p PCI 2008), HFrEF, CVA 2018, and T2DM who initially presented with chest pressure and unknown tachycardia that was shocked x1, [facility] 11/1 found to have in-stent restenosis of pLAD and  of RCA with elevated RA and PA pressures and severely decreased EF 32%, admitted to CICU for management of cardiogenic shock requiring IABP 11/1 -11/7, with hospital course c/b vfib arrest requiring reinsertion of IABP.  He is now being considered for advanced therapies and pulmonary has been consulted for further evaluation.       On exam patient endorses some ongoing difficulty clearing secretions but otherwise denies any significant cough, shortness of breath, or recent wheezing.  He has chest pain related to recent CPR.  He denies any history of smoking but states he previously was followed by pulmonology and treated for asthma with inhalers, desensitization therapy, and nucala.  Nucala was stopped in 2018 and patient denies having been on any inhalers or nucala since 2018.  He denies any family history of lung disease.            PAST MEDICAL & SURGICAL HISTORY:  HTN (hypertension)      CAD (coronary artery disease)  2009; stent      Intracranial hemorrhage  2008      Respiratory arrest  december 1st      Myocardial infarction, unspecified MI type, unspecified artery      History of coronary artery stent placement          FAMILY HISTORY:  Family history of meningitis (Sibling)  Brother, death at age 49 from complications of infection (June 2015)    Family history of hypertension in mother  Mother        SOCIAL HISTORY:  Smoking: [x ] Never Smoked [ ] Former Smoker (__ packs x ___ years) [ ] Current Smoker  (__ packs x ___ years)  Substance Use: [ ] Never Used [ ] Used ____    Allergies    penicillins (Unknown)    Intolerances        HOME MEDICATIONS:  Home Medications:  Albuterol (Eqv-ProAir HFA) 90 mcg/inh inhalation aerosol: 2 puff(s) inhaled every 6 hours as needed (01 Nov 2023 11:15)  carvedilol 25 mg oral tablet: 1 tab(s) orally 2 times a day (01 Nov 2023 11:06)  clopidogrel 75 mg oral tablet: 1 tab(s) orally once a day (01 Nov 2023 11:11)  dapagliflozin 10 mg oral tablet: 1 tab(s) orally once a day (in the morning) (01 Nov 2023 11:10)  hydroCHLOROthiazide 25 mg oral tablet: 1 tab(s) orally once a day (in the morning) (01 Nov 2023 11:06)  ipratropium-albuterol 0.5 mg-2.5 mg/3 mL inhalation solution: 3 milliliter(s) by nebulizer once a day (01 Nov 2023 11:13)  levoFLOXacin 500 mg oral tablet: 1 tab(s) orally once a day for 10 days (01 Nov 2023 11:09)  losartan 25 mg oral tablet: 1 tab(s) orally once a day (01 Nov 2023 11:06)  montelukast 10 mg oral tablet: 1 tab(s) orally once a day (01 Nov 2023 11:06)  Ozempic 4 mg/3 mL (1 mg dose) subcutaneous solution: 1 milligram(s) subcutaneously once a week (01 Nov 2023 11:14)  Trelegy Ellipta 100 mcg-62.5 mcg-25 mcg/inh inhalation powder: 2 puff(s) inhaled 2 times a day NOTE: As per Pharmacy, last filled 10/22 (01 Nov 2023 11:10)      REVIEW OF SYSTEMS:  Constitutional: [ ] negative [ ] fevers [ ] chills [ ] weight loss [ ] weight gain  HEENT: [ ] negative [ ] dry eyes [ ] eye irritation [ ] postnasal drip [ ] nasal congestion  CV: [ ] negative  [ ] chest pain [ ] orthopnea [ ] palpitations [ ] murmur  Resp: [ ] negative [ ] cough [ ] shortness of breath [ ] dyspnea [ ] wheezing [ ] sputum [ ] hemoptysis  GI: [ ] negative [ ] nausea [ ] vomiting [ ] diarrhea [ ] constipation [ ] abd pain [ ] dysphagia   : [ ] negative [ ] dysuria [ ] nocturia [ ] hematuria [ ] increased urinary frequency  Musculoskeletal: [ ] negative [ ] back pain [ ] myalgias [ ] arthralgias [ ] fracture  Skin: [ ] negative [ ] rash [ ] itch  Neurological: [ ] negative [ ] headache [ ] dizziness [ ] syncope [ ] weakness [ ] numbness  Psychiatric: [ ] negative [ ] anxiety [ ] depression  Endocrine: [ ] negative [ ] diabetes [ ] thyroid problem  Hematologic/Lymphatic: [ ] negative [ ] anemia [ ] bleeding problem  Allergic/Immunologic: [ ] negative [ ] itchy eyes [ ] nasal discharge [ ] hives [ ] angioedema  [x ] All other systems negative  [ ] Unable to assess ROS because ________    OBJECTIVE:  ICU Vital Signs Last 24 Hrs  T(C): 37.3 (18 Nov 2023 14:00), Max: 37.3 (17 Nov 2023 19:00)  T(F): 99.2 (18 Nov 2023 14:00), Max: 99.2 (18 Nov 2023 14:00)  HR: 76 (18 Nov 2023 16:00) (71 - 87)  BP: 97/50 (18 Nov 2023 07:30) (97/50 - 97/50)  BP(mean): 69 (18 Nov 2023 07:30) (69 - 69)  ABP: 110/52 (18 Nov 2023 15:00) (96/47 - 128/53)  ABP(mean): 82 (18 Nov 2023 15:00) (72 - 104)  RR: 22 (18 Nov 2023 16:00) (15 - 26)  SpO2: 96% (18 Nov 2023 16:00) (94% - 99%)    O2 Parameters below as of 18 Nov 2023 16:00  Patient On (Oxygen Delivery Method): room air              11-17 @ 07:01  -  11-18 @ 07:00  --------------------------------------------------------  IN: 1028 mL / OUT: 1770 mL / NET: -742 mL    11-18 @ 07:01  -  11-18 @ 16:01  --------------------------------------------------------  IN: 928 mL / OUT: 400 mL / NET: 528 mL      CAPILLARY BLOOD GLUCOSE      POCT Blood Glucose.: 163 mg/dL (18 Nov 2023 11:37)      PHYSICAL EXAM:  General: no acute distress   HEENT: atraumatic, normocephalic   Respiratory: slight left sided crackles at bases, otherwise no wheezing   Cardiovascular:  no obvious rubs or gallops   Abdomen: soft, non-tender, non-distended   Extremities: no significant edema   Skin: no rash/cyanosis   Neurological: no gross/focal deficits appreciated   Psychiatry: appropriate     LINES:     HOSPITAL MEDICATIONS:  Standing Meds:  aMIOdarone    Tablet 200 milliGRAM(s) Oral daily  aMIOdarone    Tablet   Oral   aspirin  chewable 81 milliGRAM(s) Oral daily  atorvastatin 80 milliGRAM(s) Oral at bedtime  budesonide  80 MICROgram(s)/formoterol 4.5 MICROgram(s) Inhaler 2 Puff(s) Inhalation two times a day  carvedilol 25 milliGRAM(s) Oral every 12 hours  chlorhexidine 2% Cloths 1 Application(s) Topical daily  dextrose 50% Injectable 25 Gram(s) IV Push once  dextrose 50% Injectable 12.5 Gram(s) IV Push once  heparin  Infusion 1600 Unit(s)/Hr IV Continuous <Continuous>  hydrALAZINE 100 milliGRAM(s) Oral three times a day  insulin glargine Injectable (LANTUS) 8 Unit(s) SubCutaneous at bedtime  insulin lispro (ADMELOG) corrective regimen sliding scale   SubCutaneous three times a day before meals  insulin lispro (ADMELOG) corrective regimen sliding scale   SubCutaneous at bedtime  insulin lispro Injectable (ADMELOG) 8 Unit(s) SubCutaneous three times a day before meals  isosorbide   dinitrate Tablet (ISORDIL) 40 milliGRAM(s) Oral three times a day  spironolactone 25 milliGRAM(s) Oral daily      PRN Meds:  LORazepam     Tablet 0.5 milliGRAM(s) Oral every 6 hours PRN      LABS:                        11.2   14.05 )-----------( 277      ( 18 Nov 2023 00:24 )             33.6     Hgb Trend: 11.2<--, 12.2<--, 12.1<--, 12.2<--, 11.5<--  11-18    130<L>  |  98  |  35<H>  ----------------------------<  147<H>  4.7   |  18<L>  |  1.73<H>    Ca    9.6      18 Nov 2023 13:59  Phos  3.1     11-18  Mg     2.2     11-18    TPro  7.8  /  Alb  3.6  /  TBili  0.5  /  DBili  x   /  AST  23  /  ALT  45  /  AlkPhos  64  11-18    Creatinine Trend: 1.73<--, 1.84<--, 1.62<--, 1.64<--, 1.76<--, 1.80<--  PT/INR - ( 18 Nov 2023 00:24 )   PT: 14.1 sec;   INR: 1.29 ratio         PTT - ( 18 Nov 2023 00:24 )  PTT:63.6 sec  Urinalysis Basic - ( 18 Nov 2023 13:59 )    Color: x / Appearance: x / SG: x / pH: x  Gluc: 147 mg/dL / Ketone: x  / Bili: x / Urobili: x   Blood: x / Protein: x / Nitrite: x   Leuk Esterase: x / RBC: x / WBC x   Sq Epi: x / Non Sq Epi: x / Bacteria: x      Arterial Blood Gas:  11-18 @ 00:13  7.40/30/110/19/99.6/-5.2  ABG lactate: --        MICROBIOLOGY:     Culture - Blood (collected 17 Nov 2023 18:45)  Source: .Blood Blood  Gram Stain (18 Nov 2023 15:04):    Growth in anaerobic bottle: Gram Positive Cocci in Pairs and Chains  Preliminary Report (18 Nov 2023 15:04):    Growth in anaerobic bottle: Gram Positive Cocci in Pairs and Chains    Culture - Blood (collected 17 Nov 2023 18:37)  Source: .Blood Blood  Gram Stain (18 Nov 2023 13:06):    Growth in aerobic bottle: Gram positive cocci in pairs  Preliminary Report (18 Nov 2023 13:07):    Growth in aerobic bottle: Gram positive cocci in pairs    Direct identification is available within approximately 3-5    hours either by Blood Panel Multiplexed PCR or Direct    MALDI-TOF. Details: [email or web address]  Organism: Blood Culture PCR (18 Nov 2023 14:20)  Organism: Blood Culture PCR (18 Nov 2023 14:20)        RADIOLOGY:  [ ] Reviewed and interpreted by me    PULMONARY FUNCTION TESTS:    EKG:

## 2023-11-18 NOTE — PROGRESS NOTE ADULT - ASSESSMENT
====================ASSESSMENT =============  57 yo male with HTN, CAD (s/p PCI 2008), HFrEF, CVA 2018, and T2DM presenting with chest pressure and unknown tachycardia that was shocked x1, [facility] 11/1 found to have in-stent restenosis of pLAD and  of RCA with elevated RA and PA pressures and severely decreased EF 32%, admitted to CICU for management of cardiogenic shock requiring IABP 11/1 -11/7, with hospital course c/b vfib arrest requiring reinsertion of IABP.     Plan:  ====================== NEUROLOGY=====================  # Anxiety  - Ativan 0.5 mg PO Q 6hrs PRN   - No Seroquel or antipsychotics since vfib arrest and prolonged QTC  - Psych Eval last week, recs for continuing Ativan PRN and had recommended SSRI, but pt. refused     ==================== RESPIRATORY======================  # Acute Hypoxemic Respiratory Failure  - s/p 2 intubations for cardiogenic pulm edema and the in setting of cardiac arrest  - Extubated 11/10  - currently on room air with spO2 mid 90s    # Asthma (hx of respiratory failure in 2018- intubated for 20 minutes per chart review pt report)  - c/w albuterol, symbicort and spiriva  - on trelegy at home    ====================CARDIOVASCULAR==================  # Vfib arrest insetting of ischemia vs. shock  - Lido off since 11/13   - PO Amio load dose (s/p IV amio ~1950mg IV gtt) for total of 5 grams per EP to complete 11/17  - Keep K > 4, Mag > 2.2     # Cardiogenic shock requiring IABP (11/1- 11/7, 11/9-)  - likely 2/2 NSTEMI and ADHF  - 11/1 LHC: pLAD 100 % in-stent restenosis & mRCA, 100 %. PCWP 30. IABP placed.  - 11/1 TTE: LV dilated. EF 32 %. Regional WMAs present, mod (grade 2) LV diastolic dysfunction  - 11/2 TTE: EF 22% and + LV thrombus   - IABP removed 11/7, vfib 11/9, IABP later reinserted that day for CS  - D/C Milrinone gtt 7:30 am 11/11  - Currently on hydralazine 100 TID and ISD 40 TID for AL reduction  - continue coreg, uptitrate as tolerated  - Holding diuretics at this time, PRN Bumex IVP as needed  - Continue romel 25 daily for GDMT  - continue to monitor perfusion indices daily  - AT Eval, F/u HF recs    # NSTEMI- Stent re-occlusion of pLAD and 100%  of RCA  - EKG on admission w/LBBB  - DAPT: c/w ASA, Brilinta d/c'd per transplant w/u  - Cont. lipitor 80  - cMR deferred given necessity of IABP  - CT sx not recommending CABG, undergoing AT eval    # LV thrombus  - c/w heparin gtt    ===================HEMATOLOGIC/ONC ===================  - H/H and platelets stable  - continue to monitor daily  # VTE prophylaxis   - c/w heparin gtt given LV Thrombus and IABP     ===================== RENAL =========================  # Non-oliguric ARIC   - sCr now improving. Baseline Cr: 1-1.22  - Diuresis held, optimize pre-renal factors for optimal perfusion and volume status  - Trend BMP, lytes daily, replace as needed  - Continue Strict I/Os, avoid nephrotoxins    ==================== GASTROINTESTINAL===================  - NPO pending colonoscopy  GERD  - c/w home protonix    Bowel regimen  - Miralax and senna d/c'd iso loose stools- trend quantity and quality of BMs    =======================    ENDOCRINE  =====================  Type 2 DM  - A1c 8.3, sugars uncontrolled on admission likely stress induced s/p cardiac arrest  - insulin gtt transitioned off 11/11 AM  - continue lantus 8, premeal 8, low ISS    ========================INFECTIOUS DISEASE================  - Afebrile, continue to monitor off abx  - Concern for aspiration event prior to intubation on admission - s/p vanco and cefepime  - BCx 11/10 NGTD, RVP neg, MRSA neg, UA neg  - Mild fluctuations in WBCs w/o fever and no left shift, continue to monitor    COVID  - Off airborne precautions 11/11    Pre-transplant ID w/u   - trend ID recs for serologies   - chest CT    LINES:   RICANDY Roberts 11/9-11/14, RAx Jacklyn 11/9, LFA IABP 11/9    Dispo: remain in CICU     ====================ASSESSMENT =============  57 yo male with HTN, CAD (s/p PCI 2008), HFrEF, CVA 2018, and T2DM presenting with chest pressure and unknown tachycardia that was shocked x1, [facility] 11/1 found to have in-stent restenosis of pLAD and  of RCA with elevated RA and PA pressures and severely decreased EF 32%, admitted to CICU for management of cardiogenic shock requiring IABP 11/1 -11/7, with hospital course c/b vfib arrest requiring reinsertion of IABP.     Plan:  ====================== NEUROLOGY=====================  # Anxiety  - Ativan 0.5 mg PO Q 6hrs PRN   - No Seroquel or antipsychotics since vfib arrest and prolonged QTC  - Psych Eval last week, recs for continuing Ativan PRN and had recommended SSRI, but pt. refused     ==================== RESPIRATORY======================  # Acute Hypoxemic Respiratory Failure  - s/p 2 intubations for cardiogenic pulm edema and the in setting of cardiac arrest  - Extubated 11/10  - currently on room air with spO2 mid 90s, continue incentive spirometry    # Asthma (hx of respiratory failure in 2018- intubated for 20 minutes per chart review pt report)  - c/w albuterol, symbicort and spiriva  - on trelegy at home    ====================CARDIOVASCULAR==================  # Vfib arrest insetting of ischemia vs. shock  - Lido off since 11/13   - PO Amio load dose (s/p IV amio ~1950mg IV gtt) for total of 5 grams per EP to complete 11/17  - Keep K > 4, Mag > 2.2     # Cardiogenic shock requiring IABP (11/1- 11/7, 11/9-)  - likely 2/2 NSTEMI and ADHF  - 11/1 LHC: pLAD 100 % in-stent restenosis & mRCA, 100 %. PCWP 30. IABP placed.  - 11/1 TTE: LV dilated. EF 32 %. Regional WMAs present, mod (grade 2) LV diastolic dysfunction  - 11/2 TTE: EF 22% and + LV thrombus   - IABP removed 11/7, vfib 11/9, IABP later reinserted that day for CS  - D/C Milrinone gtt 7:30 am 11/11  - Currently on hydralazine 100 TID and ISD 40 TID for AL reduction  - continue coreg  - Holding diuretics at this time, PRN Bumex IVP as needed  - Continue romel 25 daily for GDMT  - continue to monitor perfusion indices daily  - AT Eval, F/u HF recs    # NSTEMI- Stent re-occlusion of pLAD and 100%  of RCA  - EKG on admission w/LBBB  - DAPT: c/w ASA, Brilinta d/c'd per transplant w/u  - Cont. lipitor 80  - cMR deferred given necessity of IABP  - CT sx not recommending CABG, undergoing AT eval    # LV thrombus  - c/w heparin gtt    ===================HEMATOLOGIC/ONC ===================  - H/H and platelets stable  - continue to monitor daily  # VTE prophylaxis   - c/w heparin gtt given LV Thrombus and IABP     ===================== RENAL =========================  # Non-oliguric ARIC   - sCr uptrending, Baseline Cr: 1-1.22  - Diuresis held, optimize pre-renal factors for optimal perfusion and volume status  - Trend BMP, lytes daily, replace as needed  - Continue Strict I/Os, avoid nephrotoxins    ==================== GASTROINTESTINAL===================  - continue DASH diet  - continue to monitor for BM  GERD  - c/w home protonix    =======================    ENDOCRINE  =====================  Type 2 DM  - A1c 8.3, sugars uncontrolled on admission likely stress induced s/p cardiac arrest  - insulin gtt transitioned off 11/11 AM  - continue lantus 8, premeal 8, low ISS    ========================INFECTIOUS DISEASE================  - Afebrile, continue to monitor off abx  - Concern for aspiration event prior to intubation on admission - s/p vanco and cefepime  - BCx 11/10 NGTD, RVP neg, MRSA neg, UA neg  - Mild fluctuations in WBCs w/o fever and no left shift, f/u blood cultures, UA and UCx    COVID  - Off airborne precautions 11/11    Pre-transplant ID w/u   - trend ID recs for serologies   - colonoscopy 11/17 - normal   - chest CT 11/17 -     LINES:   ANAY Roberts 11/9-11/14, RAx Jacklyn 11/9, LFA IABP 11/9 -    Dispo: remain in CICU     ====================ASSESSMENT =============  59 yo male with HTN, CAD (s/p PCI 2008), HFrEF, CVA 2018, and T2DM presenting with chest pressure and unknown tachycardia that was shocked x1, [facility] 11/1 found to have in-stent restenosis of pLAD and  of RCA with elevated RA and PA pressures and severely decreased EF 32%, admitted to CICU for management of cardiogenic shock requiring IABP 11/1 -11/7, with hospital course c/b vfib arrest requiring reinsertion of IABP.     Plan:  ====================== NEUROLOGY=====================  # Anxiety  - Ativan 0.5 mg PO Q 6hrs PRN   - No Seroquel/antipsychotics since vfib arrest and prolonged QTC  - Psych Eval last week, recs for continuing Ativan PRN and had recommended SSRI, but pt. refused     ==================== RESPIRATORY======================  # Acute Hypoxemic Respiratory Failure  - s/p 2 intubations for cardiogenic pulm edema and the in setting of cardiac arrest, resolved - extubated 11/10  - currently on room air with spO2 mid 90s  - continue incentive spirometry and monitoring of sp02    # Asthma (hx of respiratory failure in 2018- intubated for 20 minutes per chart review pt report)  - c/w albuterol, symbicort and spiriva  - on trelegy at home    ====================CARDIOVASCULAR==================  # Vfib arrest insetting of ischemia vs. shock  - Lido off since 11/13   - PO Amio load dose (s/p IV amio ~1950mg IV gtt) for total of 5 grams per EP to complete 11/17  - Keep K > 4, Mag > 2.2     # Cardiogenic shock requiring IABP (11/1- 11/7, 11/9-)  - likely 2/2 NSTEMI and ADHF  - 11/1 LHC: pLAD 100 % in-stent restenosis & mRCA, 100 %. PCWP 30. IABP placed.  - 11/1 TTE: LV dilated. EF 32 %. Regional WMAs present, mod (grade 2) LV diastolic dysfunction  - 11/2 TTE: EF 22% and + LV thrombus   - IABP removed 11/7, vfib 11/9, IABP later reinserted that day for CS. continue IABP 1:1  - D/C Milrinone gtt 7:30 am 11/11  - Currently on hydralazine 100 TID and ISD 40 TID for AL reduction  - Holding diuretics at this time, PRN Bumex IVP as needed  - Continue romel 25 daily and coreg 25 BID for GDMT  - continue to monitor perfusion indices daily  - AT Eval, F/u HF recs    # NSTEMI- Stent re-occlusion of pLAD and 100%  of RCA  - EKG on admission w/LBBB  - DAPT: c/w ASA, Brilinta d/c'd per transplant w/u  - Cont. lipitor 80  - cMR deferred given necessity of IABP  - CT sx not recommending CABG, undergoing AT eval    # LV thrombus  - c/w heparin gtt    ===================HEMATOLOGIC/ONC ===================  - H/H and platelets stable  - continue to monitor daily  # VTE prophylaxis   - c/w heparin gtt given LV Thrombus and IABP     ===================== RENAL =========================  # Non-oliguric ARIC   - sCr uptrending, Baseline Cr: 1-1.22  - Diuresis held, optimize pre-renal factors for optimal perfusion and volume status  - Trend BMP, lytes daily, replace as needed  - Continue Strict I/Os, avoid nephrotoxins    ==================== GASTROINTESTINAL===================  - continue DASH diet  - continue to monitor for BM  GERD  - c/w home protonix    =======================    ENDOCRINE  =====================  Type 2 DM  - A1c 8.3, sugars uncontrolled on admission likely stress induced s/p cardiac arrest  - insulin gtt transitioned off 11/11 AM  - continue lantus 8, premeal 8, low ISS    ========================INFECTIOUS DISEASE================  - Afebrile, continue to monitor off abx  - Concern for aspiration event prior to intubation on admission - s/p vanco and cefepime  - BCx 11/10 NGTD, RVP neg, MRSA neg, UA neg  - Mild fluctuations in WBCs w/o fever, f/u blood cultures 11/17, UA and UCx    COVID  - Off airborne precautions 11/11    Pre-transplant ID w/u   - trend ID recs for serologies   - colonoscopy 11/17 - normal   - chest CT 11/17 - improved LLL aeration  - f/u abdominal US    LINES:   RIJ Menifee 11/9-11/14, RAx Jacklyn 11/9, LFA IABP 11/9 -    Ethics/Dispo: full code, remain in CICU     ====================ASSESSMENT =============  57 yo male with HTN, CAD (s/p PCI 2008), HFrEF, CVA 2018, and T2DM presenting with chest pressure and unknown tachycardia that was shocked x1, [facility] 11/1 found to have in-stent restenosis of pLAD and  of RCA with elevated RA and PA pressures and severely decreased EF 32%, admitted to CICU for management of cardiogenic shock requiring IABP 11/1 -11/7, with hospital course c/b vfib arrest requiring reinsertion of IABP.     Plan:  ====================== NEUROLOGY=====================  # Anxiety  - Ativan 0.5 mg PO Q 6hrs PRN   - No Seroquel/antipsychotics since vfib arrest and prolonged QTC  - Psych Eval last week, recs for continuing Ativan PRN and had recommended SSRI, but pt. refused   - needs psych eval for tx     ==================== RESPIRATORY======================  # Acute Hypoxemic Respiratory Failure  - s/p 2 intubations for cardiogenic pulm edema and the in setting of cardiac arrest, resolved - extubated 11/10  - currently on room air with spO2 mid 90s  - continue incentive spirometry and monitoring of sp02    # Asthma (hx of respiratory failure in 2018- intubated for 20 minutes per chart review pt report)  - c/w albuterol, symbicort and spiriva  - on trelegy at home    ====================CARDIOVASCULAR==================  # Vfib arrest insetting of ischemia vs. shock  - Lido off since 11/13   - PO Amio load dose (s/p IV amio ~1950mg IV gtt) for total of 5 grams per EP to complete 11/17  - Keep K > 4, Mag > 2.2     # Cardiogenic shock requiring IABP (11/1- 11/7, 11/9-)  - likely 2/2 NSTEMI and ADHF  - 11/1 LHC: pLAD 100 % in-stent restenosis & mRCA, 100 %. PCWP 30. IABP placed.  - 11/1 TTE: LV dilated. EF 32 %. Regional WMAs present, mod (grade 2) LV diastolic dysfunction  - 11/2 TTE: EF 22% and + LV thrombus   - IABP removed 11/7, vfib 11/9, IABP later reinserted that day for CS. continue IABP 1:1  - D/C Milrinone gtt 7:30 am 11/11  - Currently on hydralazine 100 TID and ISD 40 TID for AL reduction  - Holding diuretics at this time, PRN Bumex IVP as needed  - Continue romel 25 daily and coreg 25 BID for GDMT  - continue to monitor perfusion indices daily  - AT Eval, F/u HF recs    # NSTEMI- Stent re-occlusion of pLAD and 100%  of RCA  - EKG on admission w/LBBB  - DAPT: c/w ASA, Brilinta d/c'd per transplant w/u  - Cont. lipitor 80  - cMR deferred given necessity of IABP  - CT sx not recommending CABG, undergoing AT eval    # LV thrombus  - c/w heparin gtt    ===================HEMATOLOGIC/ONC ===================  - H/H and platelets stable  - continue to monitor daily  # VTE prophylaxis   - c/w heparin gtt given LV Thrombus and IABP     ===================== RENAL =========================  # Non-oliguric ARIC   - sCr uptrending, Baseline Cr: 1-1.22  - Diuresis held, optimize pre-renal factors for optimal perfusion and volume status  - Trend BMP, lytes daily, replace as needed  - Continue Strict I/Os, avoid nephrotoxins    ==================== GASTROINTESTINAL===================  - continue DASH diet  - continue to monitor for BM  GERD  - c/w home protonix    =======================    ENDOCRINE  =====================  Type 2 DM  - A1c 8.3, sugars uncontrolled on admission likely stress induced s/p cardiac arrest  - insulin gtt transitioned off 11/11 AM  - continue lantus 8, premeal 8, low ISS    ========================INFECTIOUS DISEASE================  - Afebrile, continue to monitor off abx  - Concern for aspiration event prior to intubation on admission - s/p vanco and cefepime  - BCx 11/10 NGTD, RVP neg, MRSA neg, UA neg  - Mild fluctuations in WBCs w/o fever, f/u blood cultures 11/17, UA and UCx    COVID  - Off airborne precautions 11/11    Pre-transplant ID w/u   - trend ID recs for serologies   - colonoscopy 11/17 - normal   - chest CT 11/17 - improved LLL aeration  - f/u abdominal US    LINES:   RIJ Armando 11/9-11/14, RAx Jacklyn 11/9, LFA IABP 11/9 -    Ethics/Dispo: full code, remain in CICU

## 2023-11-19 DIAGNOSIS — R78.81 BACTEREMIA: ICD-10-CM

## 2023-11-19 LAB
-  STAPHYLOCOCCUS EPIDERMIDIS: SIGNIFICANT CHANGE UP
-  STAPHYLOCOCCUS EPIDERMIDIS: SIGNIFICANT CHANGE UP
ALBUMIN SERPL ELPH-MCNC: 3.9 G/DL — SIGNIFICANT CHANGE UP (ref 3.3–5)
ALBUMIN SERPL ELPH-MCNC: 3.9 G/DL — SIGNIFICANT CHANGE UP (ref 3.3–5)
ALP SERPL-CCNC: 64 U/L — SIGNIFICANT CHANGE UP (ref 40–120)
ALP SERPL-CCNC: 64 U/L — SIGNIFICANT CHANGE UP (ref 40–120)
ALT FLD-CCNC: 45 U/L — SIGNIFICANT CHANGE UP (ref 10–45)
ALT FLD-CCNC: 45 U/L — SIGNIFICANT CHANGE UP (ref 10–45)
ANION GAP SERPL CALC-SCNC: 13 MMOL/L — SIGNIFICANT CHANGE UP (ref 5–17)
ANION GAP SERPL CALC-SCNC: 13 MMOL/L — SIGNIFICANT CHANGE UP (ref 5–17)
APTT BLD: 70.3 SEC — HIGH (ref 24.5–35.6)
APTT BLD: 70.3 SEC — HIGH (ref 24.5–35.6)
AST SERPL-CCNC: 21 U/L — SIGNIFICANT CHANGE UP (ref 10–40)
AST SERPL-CCNC: 21 U/L — SIGNIFICANT CHANGE UP (ref 10–40)
AUTO DIFF PNL BLD: NEGATIVE — SIGNIFICANT CHANGE UP
AUTO DIFF PNL BLD: NEGATIVE — SIGNIFICANT CHANGE UP
BASOPHILS # BLD AUTO: 0.06 K/UL — SIGNIFICANT CHANGE UP (ref 0–0.2)
BASOPHILS # BLD AUTO: 0.06 K/UL — SIGNIFICANT CHANGE UP (ref 0–0.2)
BASOPHILS NFR BLD AUTO: 0.5 % — SIGNIFICANT CHANGE UP (ref 0–2)
BASOPHILS NFR BLD AUTO: 0.5 % — SIGNIFICANT CHANGE UP (ref 0–2)
BILIRUB SERPL-MCNC: 0.6 MG/DL — SIGNIFICANT CHANGE UP (ref 0.2–1.2)
BILIRUB SERPL-MCNC: 0.6 MG/DL — SIGNIFICANT CHANGE UP (ref 0.2–1.2)
BUN SERPL-MCNC: 34 MG/DL — HIGH (ref 7–23)
BUN SERPL-MCNC: 34 MG/DL — HIGH (ref 7–23)
C-ANCA SER-ACNC: NEGATIVE — SIGNIFICANT CHANGE UP
C-ANCA SER-ACNC: NEGATIVE — SIGNIFICANT CHANGE UP
CALCIUM SERPL-MCNC: 9.7 MG/DL — SIGNIFICANT CHANGE UP (ref 8.4–10.5)
CALCIUM SERPL-MCNC: 9.7 MG/DL — SIGNIFICANT CHANGE UP (ref 8.4–10.5)
CHLORIDE SERPL-SCNC: 100 MMOL/L — SIGNIFICANT CHANGE UP (ref 96–108)
CHLORIDE SERPL-SCNC: 100 MMOL/L — SIGNIFICANT CHANGE UP (ref 96–108)
CO2 SERPL-SCNC: 18 MMOL/L — LOW (ref 22–31)
CO2 SERPL-SCNC: 18 MMOL/L — LOW (ref 22–31)
CREAT SERPL-MCNC: 1.65 MG/DL — HIGH (ref 0.5–1.3)
CREAT SERPL-MCNC: 1.65 MG/DL — HIGH (ref 0.5–1.3)
EGFR: 48 ML/MIN/1.73M2 — LOW
EGFR: 48 ML/MIN/1.73M2 — LOW
EOSINOPHIL # BLD AUTO: 0.26 K/UL — SIGNIFICANT CHANGE UP (ref 0–0.5)
EOSINOPHIL # BLD AUTO: 0.26 K/UL — SIGNIFICANT CHANGE UP (ref 0–0.5)
EOSINOPHIL NFR BLD AUTO: 2 % — SIGNIFICANT CHANGE UP (ref 0–6)
EOSINOPHIL NFR BLD AUTO: 2 % — SIGNIFICANT CHANGE UP (ref 0–6)
GLUCOSE BLDC GLUCOMTR-MCNC: 122 MG/DL — HIGH (ref 70–99)
GLUCOSE BLDC GLUCOMTR-MCNC: 122 MG/DL — HIGH (ref 70–99)
GLUCOSE BLDC GLUCOMTR-MCNC: 159 MG/DL — HIGH (ref 70–99)
GLUCOSE BLDC GLUCOMTR-MCNC: 159 MG/DL — HIGH (ref 70–99)
GLUCOSE BLDC GLUCOMTR-MCNC: 207 MG/DL — HIGH (ref 70–99)
GLUCOSE BLDC GLUCOMTR-MCNC: 207 MG/DL — HIGH (ref 70–99)
GLUCOSE BLDC GLUCOMTR-MCNC: 261 MG/DL — HIGH (ref 70–99)
GLUCOSE BLDC GLUCOMTR-MCNC: 261 MG/DL — HIGH (ref 70–99)
GLUCOSE SERPL-MCNC: 163 MG/DL — HIGH (ref 70–99)
GLUCOSE SERPL-MCNC: 163 MG/DL — HIGH (ref 70–99)
GRAM STN FLD: ABNORMAL
GRAM STN FLD: ABNORMAL
HCT VFR BLD CALC: 34.2 % — LOW (ref 39–50)
HCT VFR BLD CALC: 34.2 % — LOW (ref 39–50)
HGB BLD-MCNC: 11.6 G/DL — LOW (ref 13–17)
HGB BLD-MCNC: 11.6 G/DL — LOW (ref 13–17)
IMM GRANULOCYTES NFR BLD AUTO: 0.5 % — SIGNIFICANT CHANGE UP (ref 0–0.9)
IMM GRANULOCYTES NFR BLD AUTO: 0.5 % — SIGNIFICANT CHANGE UP (ref 0–0.9)
INR BLD: 1.27 RATIO — HIGH (ref 0.85–1.18)
INR BLD: 1.27 RATIO — HIGH (ref 0.85–1.18)
LYMPHOCYTES # BLD AUTO: 16 % — SIGNIFICANT CHANGE UP (ref 13–44)
LYMPHOCYTES # BLD AUTO: 16 % — SIGNIFICANT CHANGE UP (ref 13–44)
LYMPHOCYTES # BLD AUTO: 2.04 K/UL — SIGNIFICANT CHANGE UP (ref 1–3.3)
LYMPHOCYTES # BLD AUTO: 2.04 K/UL — SIGNIFICANT CHANGE UP (ref 1–3.3)
MAGNESIUM SERPL-MCNC: 2 MG/DL — SIGNIFICANT CHANGE UP (ref 1.6–2.6)
MAGNESIUM SERPL-MCNC: 2 MG/DL — SIGNIFICANT CHANGE UP (ref 1.6–2.6)
MCHC RBC-ENTMCNC: 29.2 PG — SIGNIFICANT CHANGE UP (ref 27–34)
MCHC RBC-ENTMCNC: 29.2 PG — SIGNIFICANT CHANGE UP (ref 27–34)
MCHC RBC-ENTMCNC: 33.9 GM/DL — SIGNIFICANT CHANGE UP (ref 32–36)
MCHC RBC-ENTMCNC: 33.9 GM/DL — SIGNIFICANT CHANGE UP (ref 32–36)
MCV RBC AUTO: 86.1 FL — SIGNIFICANT CHANGE UP (ref 80–100)
MCV RBC AUTO: 86.1 FL — SIGNIFICANT CHANGE UP (ref 80–100)
METHOD TYPE: SIGNIFICANT CHANGE UP
METHOD TYPE: SIGNIFICANT CHANGE UP
MONOCYTES # BLD AUTO: 0.95 K/UL — HIGH (ref 0–0.9)
MONOCYTES # BLD AUTO: 0.95 K/UL — HIGH (ref 0–0.9)
MONOCYTES NFR BLD AUTO: 7.4 % — SIGNIFICANT CHANGE UP (ref 2–14)
MONOCYTES NFR BLD AUTO: 7.4 % — SIGNIFICANT CHANGE UP (ref 2–14)
NEUTROPHILS # BLD AUTO: 9.41 K/UL — HIGH (ref 1.8–7.4)
NEUTROPHILS # BLD AUTO: 9.41 K/UL — HIGH (ref 1.8–7.4)
NEUTROPHILS NFR BLD AUTO: 73.6 % — SIGNIFICANT CHANGE UP (ref 43–77)
NEUTROPHILS NFR BLD AUTO: 73.6 % — SIGNIFICANT CHANGE UP (ref 43–77)
NRBC # BLD: 0 /100 WBCS — SIGNIFICANT CHANGE UP (ref 0–0)
NRBC # BLD: 0 /100 WBCS — SIGNIFICANT CHANGE UP (ref 0–0)
OSMOLALITY UR: 431 MOS/KG — SIGNIFICANT CHANGE UP (ref 300–900)
OSMOLALITY UR: 431 MOS/KG — SIGNIFICANT CHANGE UP (ref 300–900)
P-ANCA SER-ACNC: NEGATIVE — SIGNIFICANT CHANGE UP
P-ANCA SER-ACNC: NEGATIVE — SIGNIFICANT CHANGE UP
PHOSPHATE 24H UR-MCNC: 39.3 MG/DL — SIGNIFICANT CHANGE UP
PHOSPHATE 24H UR-MCNC: 39.3 MG/DL — SIGNIFICANT CHANGE UP
PHOSPHATE SERPL-MCNC: 3.3 MG/DL — SIGNIFICANT CHANGE UP (ref 2.5–4.5)
PHOSPHATE SERPL-MCNC: 3.3 MG/DL — SIGNIFICANT CHANGE UP (ref 2.5–4.5)
PLATELET # BLD AUTO: 270 K/UL — SIGNIFICANT CHANGE UP (ref 150–400)
PLATELET # BLD AUTO: 270 K/UL — SIGNIFICANT CHANGE UP (ref 150–400)
POTASSIUM SERPL-MCNC: 4.8 MMOL/L — SIGNIFICANT CHANGE UP (ref 3.5–5.3)
POTASSIUM SERPL-MCNC: 4.8 MMOL/L — SIGNIFICANT CHANGE UP (ref 3.5–5.3)
POTASSIUM SERPL-SCNC: 4.8 MMOL/L — SIGNIFICANT CHANGE UP (ref 3.5–5.3)
POTASSIUM SERPL-SCNC: 4.8 MMOL/L — SIGNIFICANT CHANGE UP (ref 3.5–5.3)
POTASSIUM UR-SCNC: 22 MMOL/L — SIGNIFICANT CHANGE UP
POTASSIUM UR-SCNC: 22 MMOL/L — SIGNIFICANT CHANGE UP
PROCALCITONIN SERPL-MCNC: 0.12 NG/ML — HIGH (ref 0.02–0.1)
PROCALCITONIN SERPL-MCNC: 0.12 NG/ML — HIGH (ref 0.02–0.1)
PROT SERPL-MCNC: 7.9 G/DL — SIGNIFICANT CHANGE UP (ref 6–8.3)
PROT SERPL-MCNC: 7.9 G/DL — SIGNIFICANT CHANGE UP (ref 6–8.3)
PROTHROM AB SERPL-ACNC: 13.2 SEC — HIGH (ref 9.5–13)
PROTHROM AB SERPL-ACNC: 13.2 SEC — HIGH (ref 9.5–13)
RBC # BLD: 3.97 M/UL — LOW (ref 4.2–5.8)
RBC # BLD: 3.97 M/UL — LOW (ref 4.2–5.8)
RBC # FLD: 13.9 % — SIGNIFICANT CHANGE UP (ref 10.3–14.5)
RBC # FLD: 13.9 % — SIGNIFICANT CHANGE UP (ref 10.3–14.5)
SODIUM SERPL-SCNC: 131 MMOL/L — LOW (ref 135–145)
SODIUM SERPL-SCNC: 131 MMOL/L — LOW (ref 135–145)
SODIUM UR-SCNC: 88 MMOL/L — SIGNIFICANT CHANGE UP
SODIUM UR-SCNC: 88 MMOL/L — SIGNIFICANT CHANGE UP
VANCOMYCIN FLD-MCNC: 12.9 UG/ML — SIGNIFICANT CHANGE UP
VANCOMYCIN FLD-MCNC: 12.9 UG/ML — SIGNIFICANT CHANGE UP
WBC # BLD: 12.78 K/UL — HIGH (ref 3.8–10.5)
WBC # BLD: 12.78 K/UL — HIGH (ref 3.8–10.5)
WBC # FLD AUTO: 12.78 K/UL — HIGH (ref 3.8–10.5)
WBC # FLD AUTO: 12.78 K/UL — HIGH (ref 3.8–10.5)

## 2023-11-19 PROCEDURE — 99233 SBSQ HOSP IP/OBS HIGH 50: CPT

## 2023-11-19 PROCEDURE — 93010 ELECTROCARDIOGRAM REPORT: CPT

## 2023-11-19 PROCEDURE — 99291 CRITICAL CARE FIRST HOUR: CPT

## 2023-11-19 PROCEDURE — 71045 X-RAY EXAM CHEST 1 VIEW: CPT | Mod: 26

## 2023-11-19 PROCEDURE — 99292 CRITICAL CARE ADDL 30 MIN: CPT

## 2023-11-19 RX ORDER — VANCOMYCIN HCL 1 G
1500 VIAL (EA) INTRAVENOUS ONCE
Refills: 0 | Status: COMPLETED | OUTPATIENT
Start: 2023-11-19 | End: 2023-11-19

## 2023-11-19 RX ADMIN — ISOSORBIDE DINITRATE 40 MILLIGRAM(S): 5 TABLET ORAL at 05:34

## 2023-11-19 RX ADMIN — Medication 300 MILLIGRAM(S): at 14:11

## 2023-11-19 RX ADMIN — Medication 100 MILLIGRAM(S): at 15:57

## 2023-11-19 RX ADMIN — Medication 8 UNIT(S): at 08:13

## 2023-11-19 RX ADMIN — CARVEDILOL PHOSPHATE 25 MILLIGRAM(S): 80 CAPSULE, EXTENDED RELEASE ORAL at 05:35

## 2023-11-19 RX ADMIN — INSULIN GLARGINE 12 UNIT(S): 100 INJECTION, SOLUTION SUBCUTANEOUS at 22:51

## 2023-11-19 RX ADMIN — ISOSORBIDE DINITRATE 40 MILLIGRAM(S): 5 TABLET ORAL at 17:05

## 2023-11-19 RX ADMIN — BUDESONIDE AND FORMOTEROL FUMARATE DIHYDRATE 2 PUFF(S): 160; 4.5 AEROSOL RESPIRATORY (INHALATION) at 11:21

## 2023-11-19 RX ADMIN — CHLORHEXIDINE GLUCONATE 1 APPLICATION(S): 213 SOLUTION TOPICAL at 23:12

## 2023-11-19 RX ADMIN — HEPARIN SODIUM 12 UNIT(S)/HR: 5000 INJECTION INTRAVENOUS; SUBCUTANEOUS at 11:26

## 2023-11-19 RX ADMIN — Medication 100 MILLIGRAM(S): at 22:52

## 2023-11-19 RX ADMIN — Medication 8 UNIT(S): at 15:58

## 2023-11-19 RX ADMIN — HEPARIN SODIUM 12 UNIT(S)/HR: 5000 INJECTION INTRAVENOUS; SUBCUTANEOUS at 05:35

## 2023-11-19 RX ADMIN — ATORVASTATIN CALCIUM 80 MILLIGRAM(S): 80 TABLET, FILM COATED ORAL at 22:52

## 2023-11-19 RX ADMIN — SPIRONOLACTONE 25 MILLIGRAM(S): 25 TABLET, FILM COATED ORAL at 05:35

## 2023-11-19 RX ADMIN — AMIODARONE HYDROCHLORIDE 200 MILLIGRAM(S): 400 TABLET ORAL at 05:34

## 2023-11-19 RX ADMIN — Medication 81 MILLIGRAM(S): at 11:26

## 2023-11-19 RX ADMIN — Medication 100 MILLIGRAM(S): at 05:34

## 2023-11-19 RX ADMIN — ISOSORBIDE DINITRATE 40 MILLIGRAM(S): 5 TABLET ORAL at 11:26

## 2023-11-19 RX ADMIN — Medication 1: at 08:14

## 2023-11-19 RX ADMIN — BUDESONIDE AND FORMOTEROL FUMARATE DIHYDRATE 2 PUFF(S): 160; 4.5 AEROSOL RESPIRATORY (INHALATION) at 21:23

## 2023-11-19 RX ADMIN — CARVEDILOL PHOSPHATE 25 MILLIGRAM(S): 80 CAPSULE, EXTENDED RELEASE ORAL at 18:23

## 2023-11-19 RX ADMIN — Medication 3: at 15:58

## 2023-11-19 NOTE — PROGRESS NOTE ADULT - CRITICAL CARE ATTENDING COMMENT
History of anxiety disorder and CAD s/p PCI  Admitted with cardiogenic shock and respiratory failure in the setting of stent restenosis  Transferred out and had VT/VF cardiac arrest  Intubated during arrest, ROSC achieved, readmitted to CICU  A+Ox3, anxious - continue Ativan   Cardiogenic shock on IABP, off milrinone   Maintain IABP and afterload reduction  VT/VF arrest, on Amiodarone PO    Continue with ASA  SpO2 mid 90s on RA  DASH diabetic diet   Non-oliguric ARIC that has been improving  H/H low but acceptable on Heparin drip for LV thrombus  Afebrile, + bacteremia, on abx, ID is following, IABP exchange tmr   Sugars overall controlled on Lantus  LFA IABP, rangel 11/9.

## 2023-11-19 NOTE — PROGRESS NOTE ADULT - ASSESSMENT
57 yo M with HFrEF (LVIDd 6.4 cm, LVEF 32%), CAD s/p PCI (2008), HTN, DMT2 (A1c 8.3%) and CVA s/p TPA (2018), recently treated for PNA who initially presented to [facility] via EMS after syncope reportedly requiring defibrilation. Treated for ACS but left AMA to come to [facility]. On arrival ECG with ST depression in lateral leads. Intubated 11/1 for respiratory failure. Found to have COVID. L/RHC 11/1revealing  of LAD and RCA, elevated filling pressures and CI of 1.5 prompting placement of IABP. Extubated 11/3. IABP was weaned to off 11/7, then the following day on 11/8, developed PMVT cardiac arrest with prompt CPR and defibrillation, started on IV Lidocaine and Amio. IABP ultimately replaced on 11/9 for worsening hypotension. TTE 11/11 revealing LV thrombus.     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 AT evaluation launched 11/10, he is ABO A. Currently well supported on IABP at 1:1 without further arrhythmias.  He was discussed during TSC on 11/16 and was approved for listing PENDING hepatology clearance, colonoscopy and repeat chest CT to assess PNA. He developed leukocytosis and worsening kidney function and was found to be bacteremic with GPC in pairs and chains on 11/17. Repeat cultures from 11/18 are still in lab. He's currently afebrile with downtrending leukocytosis on vanc. He is hemodynamically stable with elevated MAPs in 80-90s on IABP 1:1 on high dose oral vaosdilators/GDMT. His Cr is downtrending. He appears euvolemic on exam with net negative fluid balance over the past 24 hours off diuretics.     Cardiac Studies  11/1123 TTE: LVEF 22% (global), LV thrombus, normal RV size and function, mild MR, trace TR  11/1/23  LHC showed pLAD 70 % stenosis in the ostium portion of the segment and 100 % in-stent restenosis and in mRCA, 100 % stenosis.   11/1/23 RHC; RA 23 Vw 24, PA 52/33/40, PCWP 30 Vw 33, AO 85/66/73, PA sat 54.4%, CO/CI (F) 2.96/1.44, IABP was placed during the cath through R common femoral artery.   11/1/23 TTE: LVIDd 6.4cm , LVEF 32%, regional WMA, grade II DD,  TAPSE 1.7cm, mld MR, trace TR,     Bedside hemodynamics  11/3: IABP 1:1 RA 5; PA 27/12/18; CO/CI 5.6/2.6; MAP 90-100s.  11/4/23 IABP 1:3 CVP 4 PA 37/18 SvO2 83.8 CO/CI 11.2 CI 5.1  (in the setting of fever to 38.3C)  11/5 IABP 1:1 CVP 3 PA 48/27 SvO2 78.4% CO 8.2 CI 3.7   11/1 (IABP 1:1): CVP 1, PA 33/5/16, MvO2 74.3%

## 2023-11-19 NOTE — PROGRESS NOTE ADULT - SUBJECTIVE AND OBJECTIVE BOX
PATIENT:  LD VALENCIA  [id number]    CHIEF COMPLAINT:  Patient is a 58y old male who presents with a chief complaint of Cardiogenic shock (2023 19:39)      INTERVAL HISTORY/OVERNIGHT EVENTS:      REVIEW OF SYSTEMS:    Constitutional:     [ ] negative [ ] fevers [ ] chills [ ] weight loss [ ] weight gain  HEENT:                  [ ] negative [ ] dry eyes [ ] eye irritation [ ] postnasal drip [ ] nasal congestion  CV:                         [ ] negative  [ ] chest pain [ ] orthopnea [ ] palpitations [ ] murmur  Resp:                     [ ] negative [ ] cough [ ] shortness of breath [ ] dyspnea [ ] wheezing [ ] sputum [ ] hemoptysis  GI:                          [ ] negative [ ] nausea [ ] vomiting [ ] diarrhea [ ] constipation [ ] abd pain [ ] dysphagia   :                        [ ] negative [ ] dysuria [ ] nocturia [ ] hematuria [ ] increased urinary frequency  Musculoskeletal: [ ] negative [ ] back pain [ ] myalgias [ ] arthralgias [ ] fracture  Skin:                       [ ] negative [ ] rash [ ] itch  Neurological:        [ ] negative [ ] headache [ ] dizziness [ ] syncope [ ] weakness [ ] numbness  Psychiatric:           [ ] negative [ ] anxiety [ ] depression    [ ] All other systems negative      MEDICATIONS:  MEDICATIONS  (STANDING):  aMIOdarone    Tablet 200 milliGRAM(s) Oral daily  aMIOdarone    Tablet   Oral   aspirin  chewable 81 milliGRAM(s) Oral daily  atorvastatin 80 milliGRAM(s) Oral at bedtime  budesonide  80 MICROgram(s)/formoterol 4.5 MICROgram(s) Inhaler 2 Puff(s) Inhalation two times a day  carvedilol 25 milliGRAM(s) Oral every 12 hours  chlorhexidine 2% Cloths 1 Application(s) Topical daily  dextrose 50% Injectable 25 Gram(s) IV Push once  dextrose 50% Injectable 12.5 Gram(s) IV Push once  heparin  Infusion 1600 Unit(s)/Hr (12 mL/Hr) IV Continuous <Continuous>  hydrALAZINE 100 milliGRAM(s) Oral three times a day  insulin glargine Injectable (LANTUS) 12 Unit(s) SubCutaneous at bedtime  insulin lispro (ADMELOG) corrective regimen sliding scale   SubCutaneous three times a day before meals  insulin lispro (ADMELOG) corrective regimen sliding scale   SubCutaneous at bedtime  insulin lispro Injectable (ADMELOG) 8 Unit(s) SubCutaneous three times a day before meals  isosorbide   dinitrate Tablet (ISORDIL) 40 milliGRAM(s) Oral three times a day  spironolactone 25 milliGRAM(s) Oral daily  vancomycin  IVPB 1500 milliGRAM(s) IV Intermittent once    MEDICATIONS  (PRN):    ALLERGIES:  penicillins (Unknown)    OBJECTIVE:  ICU Vital Signs Last 24 Hrs  T(C): 37.1 (2023 05:00), Max: 37.3 (2023 07:00)  T(F): 98.7 (2023 05:00), Max: 99.2 (2023 14:00)  HR: 75 (2023 06:00) (72 - 82)  BP: 97/50 (2023 07:30) (97/50 - 97/50)  BP(mean): 69 (2023 07:30) (69 - 69)  ABP: 110/52 (2023 15:00) (96/50 - 114/54)  ABP(mean): 82 (2023 15:00) (76 - 87)  RR: 18 (2023 06:00) (15 - 35)  SpO2: 94% (2023 06:00) (94% - 99%)    O2 Parameters below as of 2023 06:00  Patient On (Oxygen Delivery Method): room air    CAPILLARY BLOOD GLUCOSE    POCT Blood Glucose.: 150 mg/dL (2023 21:59)  POCT Blood Glucose.: 220 mg/dL (2023 17:00)  POCT Blood Glucose.: 163 mg/dL (2023 11:37)  POCT Blood Glucose.: 192 mg/dL (2023 07:31)    I&O's Summary    2023 07:01  -  2023 07:00  --------------------------------------------------------  IN: 1028 mL / OUT: 1770 mL / NET: -742 mL    2023 07:01  -  2023 06:33  --------------------------------------------------------  IN: 1396 mL / OUT: 1885 mL / NET: -489 mL      Daily     Daily Weight in k.8 (2023 05:00)    PHYSICAL EXAMINATION:  General: WN/WD NAD  HEENT: PERRLA, EOMI, moist mucous membranes  Neurology: A&Ox3, nonfocal, MOISE x 4  Respiratory: CTA B/L, normal respiratory effort, no wheezes, crackles, rales  CV: RRR, S1S2, no murmurs, rubs or gallops  Abdominal: Soft, NT, ND +BS, Last BM  Extremities: No edema, + peripheral pulses  Incisions:   Tubes:    LABS:  ABG - ( 2023 00:13 )  pH, Arterial: 7.40  pH, Blood: x     /  pCO2: 30    /  pO2: 110   / HCO3: 19    / Base Excess: -5.2  /  SaO2: 99.6                 11.6   12.78 )-----------( 270      ( 2023 02:54 )             34.2     11-19    131<L>  |  100  |  34<H>  ----------------------------<  163<H>  4.8   |  18<L>  |  1.65<H>    Ca    9.7      2023 02:54  Phos  3.3     -  Mg     2.0     -    TPro  7.9  /  Alb  3.9  /  TBili  0.6  /  DBili  x   /  AST  21  /  ALT  45  /  AlkPhos  64  11-    LIVER FUNCTIONS - ( 2023 02:54 )  Alb: 3.9 g/dL / Pro: 7.9 g/dL / ALK PHOS: 64 U/L / ALT: 45 U/L / AST: 21 U/L / GGT: x           PT/INR - ( 2023 02:54 )   PT: 13.2 sec;   INR: 1.27 ratio    PTT - ( 2023 02:54 )  PTT:70.3 sec    Urinalysis Basic - ( 2023 02:54 )    Color: x / Appearance: x / SG: x / pH: x  Gluc: 163 mg/dL / Ketone: x  / Bili: x / Urobili: x   Blood: x / Protein: x / Nitrite: x   Leuk Esterase: x / RBC: x / WBC x   Sq Epi: x / Non Sq Epi: x / Bacteria: x PATIENT:  LD VALENCIA  [id number]    CHIEF COMPLAINT:  Patient is a 58y old male who presents with a chief complaint of Cardiogenic shock (2023 19:39)    INTERVAL HISTORY/OVERNIGHT EVENTS:  - No acute events overnight      REVIEW OF SYSTEMS:    Constitutional:     [X] negative [ ] fevers [ ] chills [ ] weight loss [ ] weight gain  HEENT:                  [X] negative [ ] dry eyes [ ] eye irritation [ ] postnasal drip [ ] nasal congestion  CV:                         [X] negative  [ ] chest pain [ ] orthopnea [ ] palpitations [ ] murmur  Resp:                     [X] negative [ ] cough [ ] shortness of breath [ ] dyspnea [ ] wheezing [ ] sputum [ ] hemoptysis  GI:                          [X] negative [ ] nausea [ ] vomiting [ ] diarrhea [ ] constipation [ ] abd pain [ ] dysphagia   :                        [X] negative [ ] dysuria [ ] nocturia [ ] hematuria [ ] increased urinary frequency  Musculoskeletal: [X] negative [ ] back pain [ ] myalgias [ ] arthralgias [ ] fracture  Skin:                       [X] negative [ ] rash [ ] itch  Neurological:        [X] negative [ ] headache [ ] dizziness [ ] syncope [ ] weakness [ ] numbness  Psychiatric:           [X] negative [ ] anxiety [ ] depression    [X] All other systems negative    MEDICATIONS:  MEDICATIONS  (STANDING):  aMIOdarone    Tablet 200 milliGRAM(s) Oral daily  aMIOdarone    Tablet   Oral   aspirin  chewable 81 milliGRAM(s) Oral daily  atorvastatin 80 milliGRAM(s) Oral at bedtime  budesonide  80 MICROgram(s)/formoterol 4.5 MICROgram(s) Inhaler 2 Puff(s) Inhalation two times a day  carvedilol 25 milliGRAM(s) Oral every 12 hours  chlorhexidine 2% Cloths 1 Application(s) Topical daily  dextrose 50% Injectable 25 Gram(s) IV Push once  dextrose 50% Injectable 12.5 Gram(s) IV Push once  heparin  Infusion 1600 Unit(s)/Hr (12 mL/Hr) IV Continuous <Continuous>  hydrALAZINE 100 milliGRAM(s) Oral three times a day  insulin glargine Injectable (LANTUS) 12 Unit(s) SubCutaneous at bedtime  insulin lispro (ADMELOG) corrective regimen sliding scale   SubCutaneous three times a day before meals  insulin lispro (ADMELOG) corrective regimen sliding scale   SubCutaneous at bedtime  insulin lispro Injectable (ADMELOG) 8 Unit(s) SubCutaneous three times a day before meals  isosorbide   dinitrate Tablet (ISORDIL) 40 milliGRAM(s) Oral three times a day  spironolactone 25 milliGRAM(s) Oral daily  vancomycin  IVPB 1500 milliGRAM(s) IV Intermittent once    MEDICATIONS  (PRN):    ALLERGIES:  penicillins (Unknown)    OBJECTIVE:  ICU Vital Signs Last 24 Hrs  T(C): 37.1 (2023 05:00), Max: 37.3 (2023 07:00)  T(F): 98.7 (2023 05:00), Max: 99.2 (2023 14:00)  HR: 75 (2023 06:00) (72 - 82)  BP: 97/50 (2023 07:30) (97/50 - 97/50)  BP(mean): 69 (2023 07:30) (69 - 69)  ABP: 110/52 (2023 15:00) (96/50 - 114/54)  ABP(mean): 82 (2023 15:00) (76 - 87)  RR: 18 (2023 06:00) (15 - 35)  SpO2: 94% (2023 06:00) (94% - 99%)    O2 Parameters below as of 2023 06:00  Patient On (Oxygen Delivery Method): room air    CAPILLARY BLOOD GLUCOSE    POCT Blood Glucose.: 150 mg/dL (2023 21:59)  POCT Blood Glucose.: 220 mg/dL (2023 17:00)  POCT Blood Glucose.: 163 mg/dL (2023 11:37)  POCT Blood Glucose.: 192 mg/dL (2023 07:31)    I&O's Summary    2023 07:01  -  2023 07:00  --------------------------------------------------------  IN: 1028 mL / OUT: 1770 mL / NET: -742 mL    2023 07:01  -  2023 06:33  --------------------------------------------------------  IN: 1396 mL / OUT: 1885 mL / NET: -489 mL      Daily     Daily Weight in k.8 (2023 05:00)    PHYSICAL EXAMINATION:  General: WN/WD NAD  HEENT: PERRLA, EOMI, moist mucous membranes  Neurology: A&Ox3, nonfocal, MOISE x 4  Respiratory: CTA B/L, normal respiratory effort, no wheezes, crackles, rales  CV: RRR, S1S2, no murmurs, rubs or gallops  Abdominal: Soft, NT, ND +BS  Extremities: No edema, + peripheral pulses    LABS:  ABG - ( 2023 00:13 )  pH, Arterial: 7.40  pH, Blood: x     /  pCO2: 30    /  pO2: 110   / HCO3: 19    / Base Excess: -5.2  /  SaO2: 99.6                 11.6   12.78 )-----------( 270      ( 2023 02:54 )             34.2     11-19    131<L>  |  100  |  34<H>  ----------------------------<  163<H>  4.8   |  18<L>  |  1.65<H>    Ca    9.7      2023 02:54  Phos  3.3       Mg     2.0         TPro  7.9  /  Alb  3.9  /  TBili  0.6  /  DBili  x   /  AST  21  /  ALT  45  /  AlkPhos  64  -    LIVER FUNCTIONS - ( 2023 02:54 )  Alb: 3.9 g/dL / Pro: 7.9 g/dL / ALK PHOS: 64 U/L / ALT: 45 U/L / AST: 21 U/L / GGT: x           PT/INR - ( 2023 02:54 )   PT: 13.2 sec;   INR: 1.27 ratio    PTT - ( 2023 02:54 )  PTT:70.3 sec    Urinalysis Basic - ( 2023 02:54 )    Color: x / Appearance: x / SG: x / pH: x  Gluc: 163 mg/dL / Ketone: x  / Bili: x / Urobili: x   Blood: x / Protein: x / Nitrite: x   Leuk Esterase: x / RBC: x / WBC x   Sq Epi: x / Non Sq Epi: x / Bacteria: x PATIENT:  LD VALENCIA  [id number]    CHIEF COMPLAINT:  Patient is a 58y old male who presents with a chief complaint of Cardiogenic shock (2023 19:39)    INTERVAL HISTORY/OVERNIGHT EVENTS:  - No acute events overnight      REVIEW OF SYSTEMS:    Constitutional:     [X] negative [ ] fevers [ ] chills [ ] weight loss [ ] weight gain  HEENT:                  [X] negative [ ] dry eyes [ ] eye irritation [ ] postnasal drip [ ] nasal congestion  CV:                         [X] negative  [ ] chest pain [ ] orthopnea [ ] palpitations [ ] murmur  Resp:                     [X] negative [ ] cough [ ] shortness of breath [ ] dyspnea [ ] wheezing [ ] sputum [ ] hemoptysis  GI:                          [X] negative [ ] nausea [ ] vomiting [ ] diarrhea [ ] constipation [ ] abd pain [ ] dysphagia   :                        [X] negative [ ] dysuria [ ] nocturia [ ] hematuria [ ] increased urinary frequency  Musculoskeletal: [X] negative [ ] back pain [ ] myalgias [ ] arthralgias [ ] fracture  Skin:                       [X] negative [ ] rash [ ] itch  Neurological:        [X] negative [ ] headache [ ] dizziness [ ] syncope [ ] weakness [ ] numbness  Psychiatric:           [X] negative [ ] anxiety [ ] depression    [X] All other systems negative    MEDICATIONS:  MEDICATIONS  (STANDING):  aMIOdarone    Tablet 200 milliGRAM(s) Oral daily  aMIOdarone    Tablet   Oral   aspirin  chewable 81 milliGRAM(s) Oral daily  atorvastatin 80 milliGRAM(s) Oral at bedtime  budesonide  80 MICROgram(s)/formoterol 4.5 MICROgram(s) Inhaler 2 Puff(s) Inhalation two times a day  carvedilol 25 milliGRAM(s) Oral every 12 hours  chlorhexidine 2% Cloths 1 Application(s) Topical daily  dextrose 50% Injectable 25 Gram(s) IV Push once  dextrose 50% Injectable 12.5 Gram(s) IV Push once  heparin  Infusion 1600 Unit(s)/Hr (12 mL/Hr) IV Continuous <Continuous>  hydrALAZINE 100 milliGRAM(s) Oral three times a day  insulin glargine Injectable (LANTUS) 12 Unit(s) SubCutaneous at bedtime  insulin lispro (ADMELOG) corrective regimen sliding scale   SubCutaneous three times a day before meals  insulin lispro (ADMELOG) corrective regimen sliding scale   SubCutaneous at bedtime  insulin lispro Injectable (ADMELOG) 8 Unit(s) SubCutaneous three times a day before meals  isosorbide   dinitrate Tablet (ISORDIL) 40 milliGRAM(s) Oral three times a day  spironolactone 25 milliGRAM(s) Oral daily  vancomycin  IVPB 1500 milliGRAM(s) IV Intermittent once    MEDICATIONS  (PRN):    ALLERGIES:  penicillins (Unknown)    OBJECTIVE:  ICU Vital Signs Last 24 Hrs  T(C): 37.1 (2023 05:00), Max: 37.3 (2023 07:00)  T(F): 98.7 (2023 05:00), Max: 99.2 (2023 14:00)  HR: 75 (2023 06:00) (72 - 82)  BP: 97/50 (2023 07:30) (97/50 - 97/50)  BP(mean): 69 (2023 07:30) (69 - 69)  ABP: 110/52 (2023 15:00) (96/50 - 114/54)  ABP(mean): 82 (2023 15:00) (76 - 87)  RR: 18 (2023 06:00) (15 - 35)  SpO2: 94% (2023 06:00) (94% - 99%)    O2 Parameters below as of 2023 06:00  Patient On (Oxygen Delivery Method): room air    CAPILLARY BLOOD GLUCOSE    POCT Blood Glucose.: 150 mg/dL (2023 21:59)  POCT Blood Glucose.: 220 mg/dL (2023 17:00)  POCT Blood Glucose.: 163 mg/dL (2023 11:37)  POCT Blood Glucose.: 192 mg/dL (2023 07:31)    I&O's Summary    2023 07:01  -  2023 07:00  --------------------------------------------------------  IN: 1028 mL / OUT: 1770 mL / NET: -742 mL    2023 07:01  -  2023 06:33  --------------------------------------------------------  IN: 1396 mL / OUT: 1885 mL / NET: -489 mL      Daily     Daily Weight in k.8 (2023 05:00)    PHYSICAL EXAMINATION:  General: WN/WD NAD  HEENT: PERRLA, EOMI, moist mucous membranes  Neurology: A&Ox3, nonfocal, MOISE x 4  Respiratory: CTA B/L, normal respiratory effort, no wheezes, crackles, rales  CV: RRR, S1S2, no murmurs, rubs or gallops  Abdominal: Soft, NT, ND +BS  Extremities: No edema, + peripheral pulses  L groin IABP no hematoma, no bleeding, dressing clean     LABS:  ABG - ( 2023 00:13 )  pH, Arterial: 7.40  pH, Blood: x     /  pCO2: 30    /  pO2: 110   / HCO3: 19    / Base Excess: -5.2  /  SaO2: 99.6                 11.6   12.78 )-----------( 270      ( 2023 02:54 )             34.2     -    131<L>  |  100  |  34<H>  ----------------------------<  163<H>  4.8   |  18<L>  |  1.65<H>    Ca    9.7      2023 02:54  Phos  3.3     -  Mg     2.0         TPro  7.9  /  Alb  3.9  /  TBili  0.6  /  DBili  x   /  AST  21  /  ALT  45  /  AlkPhos  64      LIVER FUNCTIONS - ( 2023 02:54 )  Alb: 3.9 g/dL / Pro: 7.9 g/dL / ALK PHOS: 64 U/L / ALT: 45 U/L / AST: 21 U/L / GGT: x           PT/INR - ( 2023 02:54 )   PT: 13.2 sec;   INR: 1.27 ratio    PTT - ( 2023 02:54 )  PTT:70.3 sec    Urinalysis Basic - ( 2023 02:54 )    Color: x / Appearance: x / SG: x / pH: x  Gluc: 163 mg/dL / Ketone: x  / Bili: x / Urobili: x   Blood: x / Protein: x / Nitrite: x   Leuk Esterase: x / RBC: x / WBC x   Sq Epi: x / Non Sq Epi: x / Bacteria: x

## 2023-11-19 NOTE — PROGRESS NOTE ADULT - PROBLEM SELECTOR PLAN 3
- pLAD 70 % stenosis in the ostium portion of the segment and 100 % in-stent restenosis. mRCA, 100 % stenosis.   - Currently ASA, Atorvastatin 80mg and Brilinta  - IABP precludes cMRI to assess for viability but per CTSX, not a candidate for CABG. - pLAD 70 % stenosis in the ostium portion of the segment and 100 % in-stent restenosis. mRCA, 100 % stenosis.   - Currently ASA, Atorvastatin 80mg   - Brilinta d/c'ed 11/13  - IABP precludes cMRI to assess for viability but per CTSX, not a candidate for CABG.

## 2023-11-19 NOTE — PROGRESS NOTE ADULT - ASSESSMENT
12/30/2019 94     ALT 12/30/2019 16     AST 12/30/2019 13     Hemoglobin A1C 12/30/2019 6.0     Cholesterol, Total 12/30/2019 151*    Triglycerides 12/30/2019 132     HDL 12/30/2019 42*    LDL Calculated 12/30/2019 83     TSH 12/30/2019 2.440     Vit D, 25-Hydroxy 12/30/2019 26.9*    Vitamin B-12 12/30/2019 368      Copies of these are in the chart. Current Outpatient Medications   Medication Sig Dispense Refill    butalbital-acetaminophen-caffeine (FIORICET, ESGIC) -40 MG per tablet Take 1 tablet by mouth every 4 hours as needed for Headaches 60 tablet 3    Cholecalciferol (VITAMIN D3) 1.25 MG (43108 UT) CAPS Take 1 capsule by mouth once a week 12 capsule 0    ibuprofen (ADVIL;MOTRIN) 800 MG tablet Take 1 tablet by mouth every 8 hours as needed for Pain 30 tablet 0    metoprolol succinate (TOPROL XL) 25 MG extended release tablet Take 1 tablet by mouth daily 90 tablet 3    spironolactone (ALDACTONE) 25 MG tablet Take 1 tablet by mouth daily 90 tablet 3    escitalopram (LEXAPRO) 20 MG tablet Take 1 tablet by mouth daily 30 tablet 3    progesterone (PROMETRIUM) 100 MG capsule Take 1 capsule by mouth nightly Cycled days 14-24 10 capsule 5    metFORMIN (GLUCOPHAGE) 500 MG tablet Take 1 tablet by mouth 2 times daily (with meals) 60 tablet 11    escitalopram (LEXAPRO) 10 MG tablet Take 1 tablet by mouth 2 times daily 60 tablet 11    Prenatal Vit-Fe Fumarate-FA (PRENATAL VITAMIN PO) Take by mouth      loratadine (CLARITIN) 10 MG tablet Take 1 tablet by mouth daily 90 tablet 3    atorvastatin (LIPITOR) 20 MG tablet Take 1 tablet by mouth daily 30 tablet 11    levothyroxine (SYNTHROID) 50 MCG tablet Take 1 tablet by mouth daily 30 tablet 11     No current facility-administered medications for this visit. Allergies: Patient has no known allergies.      Past Medical History:   Diagnosis Date    GERD (gastroesophageal reflux disease)     Hypothyroidism        Family History   Problem Relation Age of Onset    High Blood Pressure Mother     Cancer Mother     Stroke Mother     Diabetes Mother     Kidney Disease Mother     Other Mother     High Cholesterol Mother     Vision Loss Mother     Depression Mother     Heart Disease Mother    Brandy [place] / Stillbirths Mother     High Blood Pressure Father     Mental Illness Father     Vision Loss Father     Depression Father        Past Surgical History:   Procedure Laterality Date    APPENDECTOMY         Social History     Tobacco Use    Smoking status: Former Smoker     Packs/day: 0.50     Years: 5.00     Pack years: 2.50     Types: Cigarettes     Last attempt to quit: 2011     Years since quittin.7    Smokeless tobacco: Never Used   Substance Use Topics    Alcohol use: Yes     Comment: Rarely        Review of Systems   Constitutional: Negative. HENT: Negative. Eyes: Negative. Respiratory: Negative. Cardiovascular: Negative. Gastrointestinal: Negative. Rare gerd symptoms   Endocrine: Negative. Genitourinary: Positive for menstrual problem (somewhat better or at least stable). Musculoskeletal: Negative. Skin: Negative. Allergic/Immunologic: Negative. Neurological: Positive for headaches ( increased r recently due to stress most likely). Hematological: Negative. Psychiatric/Behavioral: Positive for sleep disturbance (once she gets to sleep, pretty good. she is frequently interrupted by children during sleep). Negative for dysphoric mood. The patient is nervous/anxious (but this is also improved some. ). Physical Exam  Physical exam was not performed as this was a video teleconference visit      ASSESSMENT      ICD-10-CM    1. Migraine with aura and without status migrainosus, not intractable G43.109 butalbital-acetaminophen-caffeine (FIORICET, ESGIC) -40 MG per tablet   2. Essential hypertension I10    3. PCOS (polycystic ovarian syndrome) E28.2    4.  Mixed hyperlipidemia Refill: 0    8. Anxiety and depression  She feels that she is doing okay on present medication regimen. We will continue present regimen    9. Iron deficiency  Taking prenatal vitamins on a routine basis. Recheck iron when safe to do so      No orders of the defined types were placed in this encounter. Return in about 6 months (around 10/7/2020) for 30. ====================ASSESSMENT ==============  58 male with HTN, CAD (s/p PCI 2008), HFrEF, CVA 2018, and T2DM presenting with chest pressure and unknown tachycardia that was shocked x1, [facility] 11/1 found to have in-stent restenosis of pLAD and  of RCA with elevated RA and PA pressures and severely decreased EF 32%, admitted to CICU for management of cardiogenic shock requiring IABP 11/1 -11/7, with hospital course c/b vfib arrest requiring reinsertion of IABP.     Plan:  ====================== NEUROLOGY=====================  # Anxiety  - Ativan 0.5 mg PO Q 6hrs PRN   - No Seroquel/antipsychotics since vfib arrest and prolonged QTC  - Psych Eval last week, recs for continuing Ativan PRN and had recommended SSRI, but pt. refused   - Needs psych eval for tx     # Pain 2/2 rib fractures post CPR  - c/w multimodal pain control w/ tylenol and lidoderm TD    ==================== RESPIRATORY======================  # Acute Hypoxemic Respiratory Failure  - s/p x2 intubations for cardiogenic pulm edema and the in setting of cardiac arrest, resolved - extubated 11/10  - Currently on room air with spO2 mid 90s  - Continue incentive spirometry and monitoring of sp02    # Asthma  - c/w albuterol, symbicort and spiriva  - on trelegy at home    ====================CARDIOVASCULAR==================  # Vfib arrest i/s/o ischemia vs. cardiogenic shock  - Lido gtt off since 11/13   - PO Amio load dose (s/p IV amio ~1950mg IV gtt) for total of 5 grams per EP to complete 11/17  - Keep K > 4, Mag > 2.2     # Cardiogenic shock requiring IABP (11/1- 11/7, 11/9-)  - Likely 2/2 NSTEMI and ADHF  - 11/1 LHC: pLAD 100 % in-stent restenosis & mRCA, 100 %. PCWP 30. IABP placed.  - 11/1 TTE: LV dilated. EF 32 %. Regional WMAs present, mod (grade 2) LV diastolic dysfunction  - 11/2 TTE: EF 22% and + LV thrombus   - IABP removed 11/7, vfib 11/9, IABP later reinserted that day for CS. continue IABP 1:1  - Off Milrinone gtt @ 7:30 am 11/11  - Currently on hydralazine 100 TID and ISD 40 TID for AL reduction  - Carvedilol started 11/15  - Holding diuretics at this time, PRN Bumex IVP as needed  - Continue romel 25 daily and coreg 25 BID for GDMT  - Continue to monitor perfusion indices daily  - AT Eval, F/u HF recs    # NSTEMI iso stent re-occlusion of pLAD and 100%  of RCA  - EKG on admission w/LBBB  - DAPT: c/w ASA, Brilinta d/c'd per transplant w/u  - c/w lipitor 80  - cMR deferred given necessity of IABP  - CT sx not recommending CABG, undergoing AT eval    # LV thrombus  - c/w heparin gtt    ===================HEMATOLOGIC/ONC ===================  # VTE prophylaxis   - c/w heparin gtt given LV Thrombus and IABP   - H/H and platelets stable  - Continue to monitor daily    ===================== RENAL =========================  # Non-oliguric ARIC   - sCr stable, Baseline Cr: 1-1.22  - Diuresis held, optimize pre-renal factors for optimal perfusion and volume status  - Trend BMP, lytes daily, replace as needed  - Continue Strict I/Os, avoid nephrotoxins    ==================== GASTROINTESTINAL===================  - DASH diet  - Continue to monitor for BM    GERD  - c/w home protonix    =======================    ENDOCRINE  =====================  # Type 2 DM  - A1c 8.3  - Insulin gtt transitioned off 11/11 AM  - Continue lantus 8, premeal 8, low ISS    ========================INFECTIOUS DISEASE================  # Enterococcus faecalis bacteremia  - Concern for aspiration event prior to intubation on admission - s/p vanco and cefepime course  - BCx 11/10 NGTD, RVP neg, MRSA neg, UA neg  - BCx 11/17+ for enterococcus, started on Vanco (by level) 11/18  - Plan for IABP swap once cultures clear  - Mild fluctuations in WBCs w/o fever, f/u blood cultures 11/17, UA and UCx    # COVID  - Off airborne precautions 11/11    # Pre-transplant ID w/u   - Trend ID recs for serologies   - Colonoscopy 11/17 - normal   - Chest CT 11/17 - improved LLL aeration  - f/u abdominal US    LINES:   RIJ [place] 11/9-11/14, RAx [place] 11/9, LFA IABP 11/9 -    Ethics/Dispo: full code, maintain in CICU    Patient requires continuous monitoring with bedside rhythm monitoring, pulse ox monitoring, and intermittent blood gas analysis. Care plan discussed with ICU care team. Patient remained critical and at risk for life threatening decompensation.  Patient seen, examined and plan discussed with CCU team during rounds.     I have personally provided 35 minutes of critical care time excluding time spent on separate procedures, in addition to initial critical care time provided by the CICU Attending, Dr. Durand.    Rita Henry, [facility] ====================ASSESSMENT ==============  58 male with HTN, CAD (s/p PCI 2008), HFrEF, CVA 2018, and T2DM presenting with chest pressure and unknown tachycardia that was shocked x1, [facility] 11/1 found to have in-stent restenosis of pLAD and  of RCA with elevated RA and PA pressures and severely decreased EF 32%, admitted to CICU for management of cardiogenic shock requiring IABP 11/1 -11/7, with hospital course c/b vfib arrest requiring reinsertion of IABP.     Plan:  ====================== NEUROLOGY=====================  # Anxiety  - Ativan 0.5 mg PO Q 6hrs PRN   - No Seroquel/antipsychotics since vfib arrest and prolonged QTC  - Psych Eval last week, recs for continuing Ativan PRN and had recommended SSRI, but pt. refused   - Needs psych eval for tx     # Pain 2/2 rib fractures post CPR  - c/w multimodal pain control w/ tylenol and lidoderm TD    ==================== RESPIRATORY======================  # Acute Hypoxemic Respiratory Failure  - s/p x2 intubations for cardiogenic pulm edema and the in setting of cardiac arrest, resolved - extubated 11/10  - Currently on room air with spO2 mid 90s  - Continue incentive spirometry and monitoring of sp02    # Asthma  - c/w albuterol, symbicort and spiriva  - on trelegy at home    ====================CARDIOVASCULAR==================  # Vfib arrest i/s/o ischemia vs. cardiogenic shock  - Lido gtt off since 11/13   - PO Amio load dose (s/p IV amio ~1950mg IV gtt) for total of 5 grams per EP to complete 11/17  - Keep K > 4, Mag > 2.2     # Cardiogenic shock requiring IABP (11/1- 11/7, 11/9-)  - Likely 2/2 NSTEMI and ADHF  - 11/1 LHC: pLAD 100 % in-stent restenosis & mRCA, 100 %. PCWP 30. IABP placed.  - 11/1 TTE: LV dilated. EF 32 %. Regional WMAs present, mod (grade 2) LV diastolic dysfunction  - 11/2 TTE: EF 22% and + LV thrombus   - IABP removed 11/7, vfib 11/9, IABP later reinserted that day for CS. continue IABP 1:1  - Off Milrinone gtt @ 7:30 am 11/11  - Currently on hydralazine 100 TID and ISD 40 TID for AL reduction  - Carvedilol started 11/15  - Holding diuretics at this time, PRN Bumex IVP as needed  - Continue romel 25 daily and coreg 25 BID for GDMT  - Continue to monitor perfusion indices daily  - AT Eval, F/u HF recs    # NSTEMI iso stent re-occlusion of pLAD and 100%  of RCA  - EKG on admission w/LBBB  - DAPT: c/w ASA, Brilinta d/c'd per transplant w/u  - c/w lipitor 80  - cMR deferred given necessity of IABP  - CT sx not recommending CABG, undergoing AT eval    # LV thrombus  - c/w heparin gtt    ===================HEMATOLOGIC/ONC ===================  # VTE prophylaxis   - c/w heparin gtt given LV Thrombus and IABP   - H/H and platelets stable  - Continue to monitor daily    ===================== RENAL =========================  # Non-oliguric ARIC   - sCr stable, Baseline Cr: 1-1.22  - Diuresis held, optimize pre-renal factors for optimal perfusion and volume status  - Trend BMP, lytes daily, replace as needed  - Continue Strict I/Os, avoid nephrotoxins    ==================== GASTROINTESTINAL===================  - DASH diet  - Continue to monitor for BM    GERD  - c/w home protonix    =======================    ENDOCRINE  =====================  # Type 2 DM  - A1c 8.3  - Insulin gtt transitioned off 11/11 AM  - Continue lantus 8, premeal 8, low ISS    ========================INFECTIOUS DISEASE================  # Enterococcus faecalis bacteremia  - Concern for aspiration event prior to intubation on admission - s/p vanco and cefepime course  - BCx 11/10 NGTD, RVP neg, MRSA neg, UA neg  - BCx 11/17+ for enterococcus, started on Vanco (by level) 11/18  - Plan for IABP swap once cultures clear  - Mild fluctuations in WBCs w/o fever, f/u blood cultures 11/17, UA and UCx    # COVID  - Off airborne precautions 11/11    # Pre-transplant ID w/u   - Trend ID recs for serologies   - Colonoscopy 11/17 - normal   - Chest CT 11/17 - improved LLL aeration  - f/u abdominal US    LINES:   RIJ [place] 11/9-11/14, RAx [place] 11/9-11/18, LFA IABP 11/9 -    Ethics/Dispo: full code, maintain in CICU    Patient requires continuous monitoring with bedside rhythm monitoring, pulse ox monitoring, and intermittent blood gas analysis. Care plan discussed with ICU care team. Patient remained critical and at risk for life threatening decompensation.  Patient seen, examined and plan discussed with CCU team during rounds.     I have personally provided 35 minutes of critical care time excluding time spent on separate procedures, in addition to initial critical care time provided by the CICU Attending, Dr. Durand.    Rita Henry, [facility] ====================ASSESSMENT ==============  58 male with HTN, CAD (s/p PCI 2008), HFrEF, CVA 2018, and T2DM presenting with chest pressure and unknown tachycardia that was shocked x1, [facility] 11/1 found to have in-stent restenosis of pLAD and  of RCA with elevated RA and PA pressures and severely decreased EF 32%, admitted to CICU for management of cardiogenic shock requiring IABP 11/1 -11/7, with hospital course c/b vfib arrest requiring reinsertion of IABP.     Plan:  ====================== NEUROLOGY=====================  # Anxiety  - Ativan 0.5 mg PO Q 6hrs PRN   - No Seroquel/antipsychotics since vfib arrest and prolonged QTC  - Psych Eval last week, recs for continuing Ativan PRN and had recommended SSRI, but pt. refused   - Needs psych eval for tx     # Pain 2/2 rib fractures post CPR  - c/w multimodal pain control w/ tylenol and lidoderm TD    ==================== RESPIRATORY======================  # Acute Hypoxemic Respiratory Failure  - s/p x2 intubations for cardiogenic pulm edema and the in setting of cardiac arrest, resolved - extubated 11/10  - Currently on room air with spO2 mid 90s  - Continue incentive spirometry and monitoring of sp02    # Asthma  - c/w albuterol, symbicort and spiriva  - on trelegy at home    ====================CARDIOVASCULAR==================  # Vfib arrest i/s/o ischemia vs. cardiogenic shock  - Lido gtt off since 11/13   - PO Amio load dose (s/p IV amio ~1950mg IV gtt) for total of 5 grams per EP to complete 11/17  - Keep K > 4, Mag > 2.2     # Cardiogenic shock requiring IABP (11/1- 11/7, 11/9-)  - Likely 2/2 NSTEMI and ADHF  - 11/1 LHC: pLAD 100 % in-stent restenosis & mRCA, 100 %. PCWP 30. IABP placed.  - 11/1 TTE: LV dilated. EF 32 %. Regional WMAs present, mod (grade 2) LV diastolic dysfunction  - 11/2 TTE: EF 22% and + LV thrombus   - IABP removed 11/7, vfib 11/9, IABP later reinserted that day for CS. continue IABP 1:1  - Off Milrinone gtt @ 7:30 am 11/11  - Currently on hydralazine 100 TID and ISD 40 TID for AL reduction  - Carvedilol started 11/15  - Holding diuretics at this time, PRN Bumex IVP as needed  - Continue romel 25 daily and coreg 25 BID for GDMT  - Continue to monitor perfusion indices daily  - AT Eval, F/u HF recs    # NSTEMI iso stent re-occlusion of pLAD and 100%  of RCA  - EKG on admission w/LBBB  - DAPT: c/w ASA, Brilinta d/c'd per transplant w/u  - c/w lipitor 80  - cMR deferred given necessity of IABP  - CT sx not recommending CABG, undergoing AT eval    # LV thrombus  - c/w heparin gtt    ===================HEMATOLOGIC/ONC ===================  # VTE prophylaxis   - c/w heparin gtt given LV Thrombus and IABP   - H/H and platelets stable  - Continue to monitor daily    ===================== RENAL =========================  # Non-oliguric ARIC   - sCr stable, Baseline Cr: 1-1.22  - Diuresis held, optimize pre-renal factors for optimal perfusion and volume status  - Trend BMP, lytes daily, replace as needed  - Continue Strict I/Os, avoid nephrotoxins    ==================== GASTROINTESTINAL===================  - DASH diet  - Continue to monitor for BM    GERD  - c/w home protonix    =======================    ENDOCRINE  =====================  # Type 2 DM  - A1c 8.3  - Insulin gtt transitioned off 11/11 AM  - Continue lantus 8, premeal 8, low ISS    ========================INFECTIOUS DISEASE================  # Enterococcus faecalis bacteremia  - Concern for aspiration event prior to intubation on admission - s/p vanco and cefepime course  - BCx 11/10 NGTD, RVP neg, MRSA neg, UA neg  - BCx 11/17+ for enterococcus, started on Vanco (by level) 11/18  - Plan for IABP swap as per ID   - Mild fluctuations in WBCs w/o fever, f/u blood cultures 11/17, UA and UCx    # COVID  - Off airborne precautions 11/11    # Pre-transplant ID w/u   - Trend ID recs for serologies   - Colonoscopy 11/17 - normal   - Chest CT 11/17 - improved LLL aeration  - f/u abdominal US    LINES:   RIJ [place] 11/9-11/14, RAx [place] 11/9-11/18, LFA IABP 11/9 -    Ethics/Dispo: full code, maintain in CICU    Patient requires continuous monitoring with bedside rhythm monitoring, pulse ox monitoring, and intermittent blood gas analysis. Care plan discussed with ICU care team. Patient remained critical and at risk for life threatening decompensation.  Patient seen, examined and plan discussed with CCU team during rounds.       Rita Henry, BRITANY-BC

## 2023-11-19 NOTE — PROGRESS NOTE ADULT - ASSESSMENT
57 yo male h/o htn, cad s/p pci, ICH, here with NSTEMI  s/p intubation and cath. now in CCU    NSTEMI  s/p  cath  cath results noted. multi vessel dz., CTSx f/u.   hep gtt  pt with vtach/fib arrest again on 11/8. s/p ACLS and ROSC.   now extubated  IABP in place  mngt as per CCU  plan for transplant vs LVAD as per HF and transplant team  transplant w/u ongoing.   s/p colonoscopy 11/17. normal    LV thrombus   hep gtt    acute on chronic systolic heart failure  heart failure team f/u      pna  recently diagnosed outpt  iv abx now stopped  f/u cult   id following     covid  supportive care    h/o ICH  resolved    agitation  psy consult f/u    bacteremia  id following  iv abx  possible need to change iabp cath and lines    mngt as per CCU team  d/w CCU attn         Advanced care planning was discussed with patient and family.  Advanced care planning forms were reviewed and discussed as appropriate.  Differential diagnosis and plan of care discussed with patient after the evaluation.   Pain assessed and judicious use of narcotics when appropriate was discussed.  Importance of Fall prevention discussed.  Counseling on Smoking and Alcohol cessation was offered when appropriate.  Counseling on Diet, exercise, and medication compliance was done.       Approx 75 minutes spent.

## 2023-11-19 NOTE — PROGRESS NOTE ADULT - SUBJECTIVE AND OBJECTIVE BOX
LD VALENCIA  58y Male  MRN:[id number]    Patient is a 58y old  Male who presents with a chief complaint of NSTEMI (01 Nov 2023 20:29)    HPI:  57yo M w/ hx HTN, CAD w/ 1 stent in 2009, ICH (2008) presenting with abn ekg. Patient presented to [facility] where he was found to have STEMI, recommended to get cath however patient did not want to get it there so it left and came here.  Patient initially had cough, congestion, fever, was placed on antibiotics on Sunday.  Started feeling nauseous and had a presyncopal event after which he presented to ED last night.  Had chest pain as well.  Chest pain is midsternal.  Not currently having chest pain.  Received 4 aspirin 30 min pta. (01 Nov 2023 15:11)      Patient seen and evaluated at bedside in CCU. interval events noted    Interval HPI: remain in ccu. IABP in place   +blood cult    PAST MEDICAL & SURGICAL HISTORY:  HTN (hypertension)      CAD (coronary artery disease)  2009; stent      Intracranial hemorrhage  2008      Respiratory arrest  december 1st      Myocardial infarction, unspecified MI type, unspecified artery      History of coronary artery stent placement          REVIEW OF SYSTEMS:  as per hpi     VITALS:   ICU Vital Signs Last 24 Hrs  T(C): 36.7 (19 Nov 2023 13:00), Max: 37.1 (18 Nov 2023 19:00)  T(F): 98 (19 Nov 2023 13:00), Max: 98.8 (18 Nov 2023 19:00)  HR: 77 (19 Nov 2023 14:00) (72 - 79)  BP: 121/59 (19 Nov 2023 08:00) (121/59 - 121/59)  BP(mean): 77 (19 Nov 2023 08:00) (77 - 77)  ABP: --  ABP(mean): --  RR: 24 (19 Nov 2023 14:00) (16 - 35)  SpO2: 96% (19 Nov 2023 14:00) (94% - 97%)    O2 Parameters below as of 19 Nov 2023 09:00  Patient On (Oxygen Delivery Method): room air               PHYSICAL EXAM:  GENERAL: NAD, comfortable   HEAD:  Atraumatic, Normocephalic  EYES: EOMI, PERRLA, conjunctiva and sclera clear  NECK: Supple, No JVD   CHEST/LUNG: Clear to auscultation bilaterally; No wheeze  HEART: Regular rate and rhythm; No murmurs, rubs, or gallops  ABDOMEN: Soft, Nontender, Nondistended; Bowel sounds present  EXTREMITIES:  2+ Peripheral Pulses, No clubbing, cyanosis, or edema  NEUROLOGY: nonfocal  SKIN: No rashes or lesions  +iabp    Consultant(s) Notes Reviewed:  [x ] YES  [ ] NO  Care Discussed with Consultants/Other Providers [ x] YES  [ ] NO    MEDS:   MEDICATIONS  (STANDING):  aMIOdarone    Tablet 200 milliGRAM(s) Oral daily  aMIOdarone    Tablet   Oral   aspirin  chewable 81 milliGRAM(s) Oral daily  atorvastatin 80 milliGRAM(s) Oral at bedtime  budesonide  80 MICROgram(s)/formoterol 4.5 MICROgram(s) Inhaler 2 Puff(s) Inhalation two times a day  carvedilol 25 milliGRAM(s) Oral every 12 hours  chlorhexidine 2% Cloths 1 Application(s) Topical daily  dextrose 50% Injectable 25 Gram(s) IV Push once  dextrose 50% Injectable 12.5 Gram(s) IV Push once  heparin  Infusion 1600 Unit(s)/Hr (12 mL/Hr) IV Continuous <Continuous>  hydrALAZINE 100 milliGRAM(s) Oral three times a day  insulin glargine Injectable (LANTUS) 12 Unit(s) SubCutaneous at bedtime  insulin lispro (ADMELOG) corrective regimen sliding scale   SubCutaneous at bedtime  insulin lispro (ADMELOG) corrective regimen sliding scale   SubCutaneous three times a day before meals  insulin lispro Injectable (ADMELOG) 8 Unit(s) SubCutaneous three times a day before meals  isosorbide   dinitrate Tablet (ISORDIL) 40 milliGRAM(s) Oral three times a day  spironolactone 25 milliGRAM(s) Oral daily    MEDICATIONS  (PRN):      ALLERGIES:  penicillins (Unknown)      LABS:                                                                               11.6   12.78 )-----------( 270      ( 19 Nov 2023 02:54 )             34.2   11-19    131<L>  |  100  |  34<H>  ----------------------------<  163<H>  4.8   |  18<L>  |  1.65<H>    Ca    9.7      19 Nov 2023 02:54  Phos  3.3     11-19  Mg     2.0     11-19    TPro  7.9  /  Alb  3.9  /  TBili  0.6  /  DBili  x   /  AST  21  /  ALT  45  /  AlkPhos  64  11-19        < from: Colonoscopy (11.17.23 @ 10:35) >                                                                                                        Impression:          - The entire examined colon is normal on direct and retroflexion views.                       - No specimens collected.  Recommendation:      - Resume previous diet today.                       - No large polyps or masses detected, No objection from GI standpoint to                 proceed with heart transplantation/advanced heart therapies.                       - Please call back should any further questions or concerns arise.    < end of copied text >     < from: Xray Chest 1 View- PORTABLE-Urgent (11.01.23 @ 07:42) >    IMPRESSION:  Clear lungs.    ---End of Report ---        < end of copied text >  < from: TTE W or WO Ultrasound Enhancing Agent (11.01.23 @ 10:23) >  _____________________________     CONCLUSIONS:      1. Left ventricular cavity is moderately dilated. Left ventricular wall thickness is normal. Left ventricular systolic function is severely decreased with an ejection fraction of 32 % by Chinchilla's method of disks. Regional wall motion abnormalities present.   2. Multiple segmental abnormalities exist. See findings.   3. There is moderate (grade 2) left ventricular diastolic dysfunction, with indeterminant filling pressure.   4. Normal right ventricular cavity size, wall thickness, and systolic function.   5. No significant valvular disease.   6. No pericardial effusion seen.   7. Compared to the transthoracic echocardiogram performed on 1/25/2017 the areas of akinesis are unchanged but there has been a decline in LV systolic function with new areas of hypokinesis.    __________________________________________________________________    < end of copied text >  < from: TTE Limited W or WO Ultrasound Enhancing Agent (11.02.23 @ 07:41) >  __________________________     CONCLUSIONS:      1. After obtaining consent, Definity ultrasound enhancing agent was given for enhanced left ventricular opacification and improved delineation of the left ventricular endocardial borders. Left ventricular systolic function is severely decreased with a calculated ejection fraction of 22 % by the Chinchilla's biplane method of disks. There is a left ventricular thrombus.   2. Findings were discussed with Litzy BOSS on 11/2/2023 at 8.49am.   3. There is a left ventricular thrombus.    _________________________________________________________________    < end of copied text >

## 2023-11-19 NOTE — PROGRESS NOTE ADULT - ASSESSMENT
57 yo M with PMHx of HTN, CAD w/ 1 stent in 2009, ICH (2008) presented on 11/1 with abn ekg. Patient presented to [facility] where he was found to have STEMI, recommended to get cath however patient did not want to get it there so he left and came here.   EKG here with ST depression in lateral leads and elevation in anterior leads   Prior to [facility] found to be tachycardic, dyspneic, intubated   [facility] 11/1 with chronic total occlusion of LAD and RCA, with elevated RA and PA pressures  TTE 11/1 with severely decreased EF 32%, s/p IABP 11/1    RVP (11/1) Positive for COVID19  RVP (11/10) Negative  BCx (11/2, 11/4) NGTD  ETT Culture (11/2) NGTD  MRSA/MSSA PCR (11/2, 11/10) Negative  UA (11/10) 1 WBC    CXR (11/10) Clear Lungs  TTE (11/2) Left ventricular thrombus.    #Enterococcus Bacteremia, Leukocytosis, Pre-Heart Transplant Evaluation Serologies  Concern for either central line or abdominal source  CT A/P Noncontrast (11/18) No acute process  Given mixed cultures with Staph epi and Enterococcus faecalis would continue Vancomycin for now  Patient's penicillin allergy is questionable and patient does not recall reaction.   Anticipate we can utilize Ampicillin in the future with close monitoring  --Recommend removal/exchange of Balloon Pump   --Continue Vancomycin 1.5g IV Q24H. Check trough prior to third dose. Target trough 15-20  --Continue to monitor renal function and vancomycin levels to avoid nephrotoxicity / ototoxicity secondary to vancomycin  --Continue to follow CBC with diff  --Continue to follow temperature curve  --Follow up on susceptibility of Enterococcus faecalis  --Follow up on repeat blood cultures     #COVID19  RVP (11/1) Positive for COVID19  RVP (11/10) Negative  Did not receive therapy for COVID19  Low suspicion that hypoxia currently is secondary to COVID19 (favor cardiogenic causes)  s/p isolation for COVID19    #Encounter to Vaccinate Patient  COVID19: COVID19 Infection This Admission. Would consider Vaccination in ~3 months  Influenza: Will require  Pneumococcal: Would benefit from PCV20  HAV: Will require Havrix  HBV: Will require Heplisav  MMR: Not Mumps Immune, if >1 month until transplant would consider MMR dose  Varicella: Immune  Shingles: Will require Shingrix  Tdap: Will require Tdap    I will be away starting tomorrow. Dr Courtney Peters will be covering tomorrow.     Silviano Manuel M.D.  [facility] Division of Infectious Disease  8AM-5PM Monday - Friday: Available on [employer] Teams  After Hours and Holidays (or if no response on [employer] Teams): Please contact the Infectious Diseases Office at [phone]       The above assessment and plan were discussed with Dr Chew (HF Attending) and CICU Team

## 2023-11-19 NOTE — PROGRESS NOTE ADULT - PROBLEM SELECTOR PLAN 5
BCx from 11/17 with GPC in pair/chains in both bottles. Awaiting speciation  Repeat cultures from 11/18 in lab, follow up results  Continue vanc with plan to change to ampicillin pending further investigation into PCN allergy per ID  Plan to change IABP

## 2023-11-19 NOTE — PROGRESS NOTE ADULT - NS ATTEND AMEND GEN_ALL_CORE FT
Pt's only complaint is chest wall pain attributed to rib fx. It's improving.    Remains on IABP, AAD, afterload reducing agents and AC.    Developed leukocytosis and ARIC in the past 2 days. Found to have 2/2 enterococcus bacteremia.   He will need clearance of bacteremia prior to listing. ID is recommending IABP exchange.   ABx as per ID. Continue with daily BxCx.   CT abdomen was unremarkable.   He is also pending Hepatology clearance (abd US/doppler).     Pt is on high dose BB. May have to consider weaning in case pt has worsening low output. Lactate is negative so far.  Remains critically ill.

## 2023-11-19 NOTE — PROGRESS NOTE ADULT - PROBLEM SELECTOR PLAN 1
- L IABP at 1:1, placed 11/9. On AC with Heparin infusion (also for LV thrombus); plan for IABP to be exchanged given high grade bacteremia per discussion with ID  - Currently on Coreg 25 mg BID, hold for MAP <65  - Continue HDZN 100 mg TID and ISDN 40 mg TID, hold for MAP <65  - Continue Spironolactone 25 mg QD  - PRN diuretics to target net even fluid balance  - AT evaluation: launched 11/10.   GI on board, s/p bedside colonoscopy; No masses or polyps found. Cleared from GI standpoint.   Please consult cardiorenal given Hx of DM and ARIC on CKD  As per hepatology, US Elastography demonstrates minimal risk of clinically significant fibrosis.   Please obtain Abd US/doppler to assess patency of hepatic and portal veins as requested on 11/13.  If doppler is negative, there is no hepatic contraindication or further evaluation needed for advanced heart failure therapy.    S/p CT chest with improved LLL aeration will Pulm input once resulted.   - Please keep primary Dr. Banuelos [phone] in the loop per family request.  cPRA 0% 11/13 - L IABP at 1:1, placed 11/9. On AC with Heparin infusion (also for LV thrombus); plan for IABP to be exchanged given high grade bacteremia per discussion with ID  - Currently on Coreg 25 mg BID ?? hold for MAP <65  - Continue HDZN 100 mg TID and ISDN 40 mg TID, hold for MAP <65  - Continue Spironolactone 25 mg QD  - PRN diuretics to target net even fluid balance  - AT evaluation: launched 11/10.   GI on board, s/p bedside colonoscopy; No masses or polyps found. Cleared from GI standpoint.   Appreciate cardiorenal c/s   As per hepatology, US Elastography demonstrates minimal risk of clinically significant fibrosis.   Please obtain Abd US/doppler to assess patency of hepatic and portal veins as requested on 11/13. PENDING  If doppler is negative, there is no hepatic contraindication or further evaluation needed for advanced heart failure therapy.    S/p CT chest with improved LLL aeration.    cPRA 0% 11/13  Last Brilinta dose was on 11/13  Will need TxID clearance prior to listing  - Please keep primary Dr. Banuelos [phone] in the loop per family request.

## 2023-11-19 NOTE — PROGRESS NOTE ADULT - SUBJECTIVE AND OBJECTIVE BOX
LD VALENCIA  MRN-[id number]  Patient is a 58y old  Male who presents with a chief complaint of CP/SOB (2023 06:32)    HPI:  pt seen and approx 1:30 pm  in [facility]    59yo M w/ hx HTN, CAD w/ 1 stent in , ICH () presenting with abn ekg. Patient presented to [facility] where he was found to have STEMI, recommended to get cath however patient did not want to get it there so it left and came here.  Patient initially had cough, congestion, fever, was placed on antibiotics on .  Started feeling nauseous and had a presyncopal event after which he presented to ED last night.  Had chest pain as well.  Chest pain is midsternal.  Not currently having chest pain.  Received 4 aspirin 30 min pta. (2023 15:11)      Hospital Course:    24 HOUR EVENTS:    REVIEW OF SYSTEMS:    CONSTITUTIONAL: No weakness, fevers or chills  EYES/ENT: No visual changes;  No vertigo or throat pain   NECK: No pain or stiffness  RESPIRATORY: No cough, wheezing, hemoptysis; No shortness of breath  CARDIOVASCULAR: No chest pain or palpitations  GASTROINTESTINAL: No abdominal or epigastric pain. No nausea, vomiting, or hematemesis; No diarrhea or constipation. No melena or hematochezia.  GENITOURINARY: No dysuria, frequency or hematuria  NEUROLOGICAL: No numbness or weakness  SKIN: No itching, rashes      ICU Vital Signs Last 24 Hrs  T(C): 36.9 (2023 19:00), Max: 37.1 (2023 05:00)  T(F): 98.4 (2023 19:00), Max: 98.7 (2023 05:00)  HR: 79 (2023 19:00) (72 - 83)  BP: 121/59 (2023 08:00) (121/59 - 121/59)  BP(mean): 77 (2023 08:00) (77 - 77)  ABP: --  ABP(mean): --  RR: 20 (2023 19:00) (16 - 35)  SpO2: 95% (2023 19:00) (94% - 97%)    O2 Parameters below as of 2023 19:00  Patient On (Oxygen Delivery Method): room air            CVP(mm Hg): --  CO: --  CI: --  PA: --  PA(mean): --  PA(direct): --  PCWP: --  LA: --  RA: --  SVR: --  SVRI: --  PVR: --  PVRI: --  I&O's Summary    2023 07:01  -  2023 07:00  --------------------------------------------------------  IN: 1408 mL / OUT: 1885 mL / NET: -477 mL    2023 07:01  -  2023 20:05  --------------------------------------------------------  IN: 1124 mL / OUT: 700 mL / NET: 424 mL        CAPILLARY BLOOD GLUCOSE    CAPILLARY BLOOD GLUCOSE      POCT Blood Glucose.: 261 mg/dL (2023 15:46)      PHYSICAL EXAM:  GENERAL: No acute distress, well-developed  HEAD:  Atraumatic, Normocephalic  EYES: EOMI, PERRLA, conjunctiva and sclera clear  NECK: Supple, no lymphadenopathy, no JVD  CHEST/LUNG: CTAB; No wheezes, rales, or rhonchi  HEART: Regular rate and rhythm. Normal S1/S2. No murmurs, rubs, or gallops  ABDOMEN: Soft, non-tender, non-distended; normal bowel sounds, no organomegaly  EXTREMITIES:  2+ peripheral pulses b/l, No clubbing, cyanosis, or edema  NEUROLOGY: A&O x 3, no focal deficits  SKIN: No rashes or lesions    ============================I/O===========================   I&O's Detail    2023 07:01  -  2023 07:00  --------------------------------------------------------  IN:    Heparin: 288 mL    IV PiggyBack: 100 mL    Oral Fluid: 1020 mL  Total IN: 1408 mL    OUT:    Voided (mL): 1885 mL  Total OUT: 1885 mL    Total NET: -477 mL      2023 07:01  -  2023 20:05  --------------------------------------------------------  IN:    Heparin: 144 mL    IV PiggyBack: 500 mL    Oral Fluid: 480 mL  Total IN: 1124 mL    OUT:    Voided (mL): 700 mL  Total OUT: 700 mL    Total NET: 424 mL        ============================ LABS =========================                        11.6   12.78 )-----------( 270      ( 2023 02:54 )             34.2         131<L>  |  100  |  34<H>  ----------------------------<  163<H>  4.8   |  18<L>  |  1.65<H>    Ca    9.7      2023 02:54  Phos  3.3       Mg     2.0         TPro  7.9  /  Alb  3.9  /  TBili  0.6  /  DBili  x   /  AST  21  /  ALT  45  /  AlkPhos  64                  LIVER FUNCTIONS - ( 2023 02:54 )  Alb: 3.9 g/dL / Pro: 7.9 g/dL / ALK PHOS: 64 U/L / ALT: 45 U/L / AST: 21 U/L / GGT: x           PT/INR - ( 2023 02:54 )   PT: 13.2 sec;   INR: 1.27 ratio         PTT - ( 2023 02:54 )  PTT:70.3 sec  ABG - ( 2023 00:13 )  pH, Arterial: 7.40  pH, Blood: x     /  pCO2: 30    /  pO2: 110   / HCO3: 19    / Base Excess: -5.2  /  SaO2: 99.6              Blood Gas Arterial, Lactate: 0.8 mmol/L (23 @ 00:13)  Lactate, Blood: 0.8 mmol/L (23 @ 01:45)    Urinalysis Basic - ( 2023 02:54 )    Color: x / Appearance: x / SG: x / pH: x  Gluc: 163 mg/dL / Ketone: x  / Bili: x / Urobili: x   Blood: x / Protein: x / Nitrite: x   Leuk Esterase: x / RBC: x / WBC x   Sq Epi: x / Non Sq Epi: x / Bacteria: x      ======================Micro/Rad/Cardio=================  Telemtry: Reviewed   EKG: Reviewed  CXR: Reviewed  Culture: Reviewed   Echo:   Cath: Cardiac Cath Lab - Adult:   [facility]  Department of Cardiology  [address] [place] [address]  [phone]  Cath Lab Report -- Comprehensive Report  Patient: LD VALENCIA  Study date: 2017  Account number: [id number]  MR number: [id number]  : 1965  Gender: Male  Race: W  Case Physician(s):  Jairon Holguin M.D.  Referring Physician:  Luc Lynn M.D.  INDICATIONS: Unstable angina - CCS4.  HISTORY: The patient has a history of coronary artery disease. The patient  hashypertension and medication-treated dyslipidemia.  PROCEDURE:  --  Left heart catheterization with ventriculography.  --  Left coronary angiography.  --  Right coronary angiography.  TECHNIQUE: The risks and alternatives of the procedures and conscious  sedation were explained to the patient and informed consent was obtained.  Cardiac catheterization performed electively.  Local anesthetic given. Right radial artery access. A 6FR PRELUDE KIT was  inserted in the vessel. Left heart catheterization. Ventriculography was  performed. A 5FR FR4.0 EXPO catheter was utilized. Left coronary artery  angiography. The vessel was injected utilizing a 5FR FL3.5 EXPO catheter.  Right coronary artery angiography. The vessel was injected utilizing a 5FR  FR4.0 EXPO catheter. RADIATION EXPOSURE: 1.1 min.  CONTRAST GIVEN: Omnipaque 55 ml.  MEDICATIONS GIVEN: Midazolam, 1 mg, IV. Fentanyl, 25 mcg, IV. Verapamil  (Isoptin, Calan, Covera), 2.5 mg, IA. Heparin, 3000 units, IA.  VENTRICLES: Global left ventricular function was moderately depressed. EF  estimated was 40 %.  CORONARY VESSELS: The coronary circulation is right dominant.  LM:   --  LM: Normal.  LAD:   --  Proximal LAD: There was a 50 % stenosis.  CX:   --  Circumflex: Normal.  RCA:   --  Mid RCA: There was a 40 % stenosis.  --  Distal RCA: There was a 50 % stenosis.  COMPLICATIONS: There were no complications.  DIAGNOSTIC RECOMMENDATIONS: The patient should continue with the present  medications.  Prepared and signed by  Jairon Holguin M.D.  Signed 2017 12:20:13  HEMODYNAMIC TABLES  Pressures:  Baseline/ Room Air  Pressures:  - HR: 78  Pressures:  - Rhythm:  Pressures:  -- Aortic Pressure (S/D/M): --/--/99  Pressures:  -- Left Ventricle (s/edp): 157/39/--  Outputs:  Baseline/ Room Air  Outputs:  -- CALCULATIONS[age]  Outputs:  -- CALCULATIONS: Body Surface Area: 2.05  Outputs:  -- CALCULATIONS: Height in cm: 175.00  Outputs:  -- CALCULATIONS: Sex: Male  Outputs:  -- CALCULATIONS: Weight in k.40 (17 @ 21:55)    ======================================================  PAST MEDICAL & SURGICAL HISTORY:  HTN (hypertension)      CAD (coronary artery disease)  ; stent      Intracranial hemorrhage        Respiratory arrest        Myocardial infarction, unspecified MI type, unspecified artery      History of coronary artery stent placement  ====================ASSESSMENT ==============  58 male with HTN, CAD (s/p PCI ), HFrEF, CVA , and T2DM presenting with chest pressure and unknown tachycardia that was shocked x1, [facility]  found to have in-stent restenosis of pLAD and  of RCA with elevated RA and PA pressures and severely decreased EF 32%, admitted to CICU for management of cardiogenic shock requiring IABP  -, with hospital course c/b vfib arrest requiring reinsertion of IABP.     Plan:  ====================== NEUROLOGY=====================  # Anxiety  - Ativan 0.5 mg PO Q 6hrs PRN   - No Seroquel/antipsychotics since vfib arrest and prolonged QTC  - Psych Eval last week, recs for continuing Ativan PRN and had recommended SSRI, but pt. refused   - Needs psych eval for tx     # Pain 2/2 rib fractures post CPR  - c/w multimodal pain control w/ tylenol and lidoderm TD    ==================== RESPIRATORY======================  # Acute Hypoxemic Respiratory Failure  - s/p x2 intubations for cardiogenic pulm edema and the in setting of cardiac arrest, resolved - extubated 11/10  - Currently on room air with spO2 mid 90s  - Continue incentive spirometry and monitoring of sp02    # Asthma  - c/w albuterol, symbicort and spiriva  - on trelegy at home    ====================CARDIOVASCULAR==================  # Vfib arrest i/s/o ischemia vs. cardiogenic shock  - Lido gtt off since    - PO Amio load dose (s/p IV amio ~1950mg IV gtt) for total of 5 grams per EP to complete   - Keep K > 4, Mag > 2.2     # Cardiogenic shock requiring IABP (- , -)  - Likely 2/2 NSTEMI and ADHF  -  LHC: pLAD 100 % in-stent restenosis & mRCA, 100 %. PCWP 30. IABP placed.  -  TTE: LV dilated. EF 32 %. Regional WMAs present, mod (grade 2) LV diastolic dysfunction  -  TTE: EF 22% and + LV thrombus   - IABP removed , vfib , IABP later reinserted that day for CS. continue IABP 1:1  - Off Milrinone gtt @ 7:30 am   - Currently on hydralazine 100 TID and ISD 40 TID for AL reduction  - Carvedilol started 11/15  - Holding diuretics at this time, PRN Bumex IVP as needed  - Continue romel 25 daily and coreg 25 BID for GDMT  - Continue to monitor perfusion indices daily  - AT Eval, F/u HF recs    # NSTEMI iso stent re-occlusion of pLAD and 100%  of RCA  - EKG on admission w/LBBB  - DAPT: c/w ASA, Brilinta d/c'd per transplant w/u  - c/w lipitor 80  - cMR deferred given necessity of IABP  - CT sx not recommending CABG, undergoing AT eval    # LV thrombus  - c/w heparin gtt    ===================HEMATOLOGIC/ONC ===================  # VTE prophylaxis   - c/w heparin gtt given LV Thrombus and IABP   - H/H and platelets stable  - Continue to monitor daily    ===================== RENAL =========================  # Non-oliguric ARIC   - sCr stable, Baseline Cr: 1-1.22  - Diuresis held, optimize pre-renal factors for optimal perfusion and volume status  - Trend BMP, lytes daily, replace as needed  - Continue Strict I/Os, avoid nephrotoxins    ==================== GASTROINTESTINAL===================  - DASH diet  - Continue to monitor for BM    GERD  - c/w home protonix    =======================    ENDOCRINE  =====================  # Type 2 DM  - A1c 8.3  - Insulin gtt transitioned off  AM  - Continue lantus 8, premeal 8, low ISS    ========================INFECTIOUS DISEASE================  # Enterococcus faecalis bacteremia  - Concern for aspiration event prior to intubation on admission - s/p vanco and cefepime course  - BCx 11/10 NGTD, RVP neg, MRSA neg, UA neg  - BCx + for enterococcus, started on Vanco (by level)   - Plan for IABP swap as per ID   - Mild fluctuations in WBCs w/o fever, f/u blood cultures , UA and UCx    # COVID  - Off airborne precautions     # Pre-transplant ID w/u   - Trend ID recs for serologies   - Colonoscopy  - normal   - Chest CT  - improved LLL aeration  - f/u abdominal US    LINES:   RIJ [place] -, RAx Jacklyn -, LFA IABP  -    Ethics/Dispo: full code, maintain in CICU      Patient requires continuous monitoring with bedside rhythm monitoring, pulse ox monitoring, and intermittent blood gas analysis. Care plan discussed with ICU care team. Patient remained critical and at risk for life threatening decompensation.  Patient seen, examined and plan discussed with CCU team during rounds.     I have personally provided ____ minutes of critical care time excluding time spent on separate procedures, in addition to initial critical care time provided by the CICU Attending, Dr. Durand.    By signing my name below, I, Kalyn Sousa, attest that this documentation has been prepared under the direction and in the presence of KARYN Washburn.  Electronically signed: Rosalba Booth, 23 @ 20:05    I, Laura Mcclendon , personally performed the services described in this documentation. all medical record entries made by the scribe were at my direction and in my presence. I have reviewed the chart and agree that the record reflects my personal performance and is accurate and complete  Electronically signed: KARYN Washburn.       LD VALENCIA  MRN-[id number]  Patient is a 58y old  Male who presents with a chief complaint of CP/SOB (2023 06:32)    HPI:  59yo M w/ hx HTN, CAD w/ 1 stent in , ICH () presenting with abn ekg. Patient presented to [facility] where he was found to have STEMI, recommended to get cath however patient did not want to get it there so it left and came here.  Patient initially had cough, congestion, fever, was placed on antibiotics on .  Started feeling nauseous and had a presyncopal event after which he presented to ED last night.  Had chest pain as well.  Chest pain is midsternal.  Not currently having chest pain.  Received 4 aspirin 30 min pta. (2023 15:11)    24 HOUR EVENTS:  - s/p CT A/P without intraabdominal pathology    ICU Vital Signs Last 24 Hrs  T(C): 36.9 (2023 19:00), Max: 37.1 (2023 05:00)  T(F): 98.4 (2023 19:00), Max: 98.7 (2023 05:00)  HR: 79 (2023 19:00) (72 - 83)  BP: 121/59 (2023 08:00) (121/59 - 121/59)  BP(mean): 77 (2023 08:00) (77 - 77)  RR: 20 (2023 19:00) (16 - 35)  SpO2: 95% (2023 19:00) (94% - 97%)    O2 Parameters below as of 2023 19:00  Patient On (Oxygen Delivery Method): room air    I&O's Summary    2023 07:01  -  2023 07:00  --------------------------------------------------------  IN: 1408 mL / OUT: 1885 mL / NET: -477 mL    2023 07:01  -  2023 20:05  --------------------------------------------------------  IN: 1124 mL / OUT: 700 mL / NET: 424 mL    CAPILLARY BLOOD GLUCOSE  POCT Blood Glucose.: 261 mg/dL (2023 15:46)    PHYSICAL EXAM:  GENERAL: No acute distress, well-developed  CHEST/LUNG: CTAB; No wheezes, rales, or rhonchi  HEART: Regular rate and rhythm. Normal S1/S2. No murmurs, rubs, or gallops  ABDOMEN: Soft, non-tender, non-distended  EXTREMITIES: No clubbing, cyanosis, or edema  NEUROLOGY: A&O x 3, no focal deficits    ============================I/O===========================   I&O's Detail    2023 07:  -  2023 07:00  --------------------------------------------------------  IN:    Heparin: 288 mL    IV PiggyBack: 100 mL    Oral Fluid: 1020 mL  Total IN: 1408 mL    OUT:    Voided (mL): 1885 mL  Total OUT: 1885 mL    Total NET: -477 mL      2023 07:01  -  2023 20:05  --------------------------------------------------------  IN:    Heparin: 144 mL    IV PiggyBack: 500 mL    Oral Fluid: 480 mL  Total IN: 1124 mL    OUT:    Voided (mL): 700 mL  Total OUT: 700 mL    Total NET: 424 mL    ============================ LABS =========================                        11.6   12.78 )-----------( 270      ( 2023 02:54 )             34.2         131<L>  |  100  |  34<H>  ----------------------------<  163<H>  4.8   |  18<L>  |  1.65<H>    Ca    9.7      2023 02:54  Phos  3.3       Mg     2.0         TPro  7.9  /  Alb  3.9  /  TBili  0.6  /  DBili  x   /  AST  21  /  ALT  45  /  AlkPhos  64      LIVER FUNCTIONS - ( 2023 02:54 )  Alb: 3.9 g/dL / Pro: 7.9 g/dL / ALK PHOS: 64 U/L / ALT: 45 U/L / AST: 21 U/L / GGT: x           PT/INR - ( 2023 02:54 )   PT: 13.2 sec;   INR: 1.27 ratio         PTT - ( 2023 02:54 )  PTT:70.3 sec  ABG - ( 2023 00:13 )  pH, Arterial: 7.40  pH, Blood: x     /  pCO2: 30    /  pO2: 110   / HCO3: 19    / Base Excess: -5.2  /  SaO2: 99.6      Blood Gas Arterial, Lactate: 0.8 mmol/L (23 @ 00:13)  Lactate, Blood: 0.8 mmol/L (23 @ 01:45)    Urinalysis Basic - ( 2023 02:54 )    Color: x / Appearance: x / SG: x / pH: x  Gluc: 163 mg/dL / Ketone: x  / Bili: x / Urobili: x   Blood: x / Protein: x / Nitrite: x   Leuk Esterase: x / RBC: x / WBC x   Sq Epi: x / Non Sq Epi: x / Bacteria: x    ======================Micro/Rad/Cardio=================  Telemtry: Reviewed   EKG: Reviewed  CXR: Reviewed  Culture: Reviewed   Echo:   Cath: Cardiac Cath Lab - Adult:   [facility]  Department of Cardiology  [address] [place] [address]  [phone]  Cath Lab Report -- Comprehensive Report  Patient: LD VALENCIA  Study date: 2017  Account number: [id number]  MR number: [id number]  : 1965  Gender: Male  Race: W  Case Physician(s):  Jairon Holguin M.D.  Referring Physician:  Luc Lynn M.D.  INDICATIONS: Unstable angina - CCS4.  HISTORY: The patient has a history of coronary artery disease. The patient  hashypertension and medication-treated dyslipidemia.  PROCEDURE:  --  Left heart catheterization with ventriculography.  --  Left coronary angiography.  --  Right coronary angiography.  TECHNIQUE: The risks and alternatives of the procedures and conscious  sedation were explained to the patient and informed consent was obtained.  Cardiac catheterization performed electively.  Local anesthetic given. Right radial artery access. A 6FR PRELUDE KIT was  inserted in the vessel. Left heart catheterization. Ventriculography was  performed. A 5FR FR4.0 EXPO catheter was utilized. Left coronary artery  angiography. The vessel was injected utilizing a 5FR FL3.5 EXPO catheter.  Right coronary artery angiography. The vessel was injected utilizing a 5FR  FR4.0 EXPO catheter. RADIATION EXPOSURE: 1.1 min.  CONTRAST GIVEN: Omnipaque 55 ml.  MEDICATIONS GIVEN: Midazolam, 1 mg, IV. Fentanyl, 25 mcg, IV. Verapamil  (Isoptin, Calan, Covera), 2.5 mg, IA. Heparin, 3000 units, IA.  VENTRICLES: Global left ventricular function was moderately depressed. EF  estimated was 40 %.  CORONARY VESSELS: The coronary circulation is right dominant.  LM:   --  LM: Normal.  LAD:   --  Proximal LAD: There was a 50 % stenosis.  CX:   --  Circumflex: Normal.  RCA:   --  Mid RCA: There was a 40 % stenosis.  --  Distal RCA: There was a 50 % stenosis.  COMPLICATIONS: There were no complications.  DIAGNOSTIC RECOMMENDATIONS: The patient should continue with the present  medications.  Prepared and signed by  Jairon Holguin M.D.  Signed 2017 12:20:13  HEMODYNAMIC TABLES  Pressures:  Baseline/ Room Air  Pressures:  - HR: 78  Pressures:  - Rhythm:  Pressures:  -- Aortic Pressure (S/D/M): --/--/99  Pressures:  -- Left Ventricle (s/edp): 157/39/--  Outputs:  Baseline/ Room Air  Outputs:  -- CALCULATIONS[age]  Outputs:  -- CALCULATIONS: Body Surface Area: 2.05  Outputs:  -- CALCULATIONS: Height in cm: 175.00  Outputs:  -- CALCULATIONS: Sex: Male  Outputs:  -- CALCULATIONS: Weight in k.40 (17 @ 21:55)    ======================================================  PAST MEDICAL & SURGICAL HISTORY:  HTN (hypertension)      CAD (coronary artery disease)  ; stent      Intracranial hemorrhage        Respiratory arrest        Myocardial infarction, unspecified MI type, unspecified artery      History of coronary artery stent placement    ====================ASSESSMENT ==============  58 male with HTN, CAD (s/p PCI ), HFrEF, CVA , and T2DM presenting with chest pressure and unknown tachycardia that was shocked x1, [facility]  found to have in-stent restenosis of pLAD and  of RCA with elevated RA and PA pressures and severely decreased EF 32%, admitted to CICU for management of cardiogenic shock requiring IABP  -, with hospital course c/b vfib arrest requiring reinsertion of IABP. Currently undergoing workup for AT evaluation with HF.    Plan:  ====================== NEUROLOGY=====================  # Anxiety  - Ativan 0.5 mg PO Q 6hrs PRN   - No Seroquel/antipsychotics since vfib arrest and prolonged QTC  - Psych Eval last week, recs for continuing Ativan PRN and had recommended SSRI, but pt. refused   - Needs psych eval for tx     # Pain 2/2 rib fractures post CPR  - c/w multimodal pain control w/ tylenol and lidoderm TD    ==================== RESPIRATORY======================  # Acute Hypoxemic Respiratory Failure  - s/p x2 intubations for cardiogenic pulm edema and the in setting of cardiac arrest, resolved - extubated 11/10  - Currently on room air with spO2 mid 90s  - Continue incentive spirometry and monitoring of sp02    # Asthma  - c/w albuterol, symbicort and spiriva  - on trelegy at home    ====================CARDIOVASCULAR==================  # Vfib arrest i/s/o ischemia vs. cardiogenic shock  - Lido gtt off since    - PO Amio load dose (s/p IV amio ~1950mg IV gtt) for total of 5 grams per EP to complete   - Keep K > 4, Mag > 2.2     # Cardiogenic shock requiring IABP (- , -)  - Likely 2/2 NSTEMI and ADHF  -  LHC: pLAD 100 % in-stent restenosis & mRCA, 100 %. PCWP 30. IABP placed.  -  TTE: LV dilated. EF 32 %. Regional WMAs present, mod (grade 2) LV diastolic dysfunction  -  TTE: EF 22% and + LV thrombus   - IABP removed , vfib , IABP later reinserted that day for CS. continue IABP 1:1  - Off Milrinone gtt @ 7:30 am   - Currently on hydralazine 100 TID and ISD 40 TID for AL reduction  - Carvedilol started 11/15  - Holding diuretics at this time, PRN Bumex IVP as needed  - Continue romel 25 daily and coreg 25 BID for GDMT  - Continue to monitor perfusion indices daily  - AT Eval, F/u HF recs    # NSTEMI iso stent re-occlusion of pLAD and 100%  of RCA  - EKG on admission w/LBBB  - DAPT: c/w ASA, Brilinta d/c'd per transplant w/u  - c/w lipitor 80  - cMR deferred given necessity of IABP  - CT sx not recommending CABG, undergoing AT eval    # LV thrombus  - c/w heparin gtt    ===================HEMATOLOGIC/ONC ===================  # VTE prophylaxis   - c/w heparin gtt given LV Thrombus and IABP   - H/H and platelets stable  - Continue to monitor daily    ===================== RENAL =========================  # Non-oliguric ARIC   - sCr stable, Baseline Cr: 1-1.22  - Diuresis held, optimize pre-renal factors for optimal perfusion and volume status  - Trend BMP, lytes daily, replace as needed  - Continue Strict I/Os, avoid nephrotoxins    ==================== GASTROINTESTINAL===================  - DASH diet  - Continue to monitor for BM    GERD  - c/w home protonix    =======================    ENDOCRINE  =====================  # Type 2 DM  - A1c 8.3  - Insulin gtt transitioned off  AM  - Continue lantus 8, premeal 8, low ISS    ========================INFECTIOUS DISEASE================  # Enterococcus faecalis bacteremia  - Concern for aspiration event prior to intubation on admission - s/p vanco and cefepime course  - BCx 11/10 NGTD, RVP neg, MRSA neg, UA neg  - BCx + for enterococcus, started on Vanco (by level)   - Plan for IABP swap as per ID   - Mild fluctuations in WBCs w/o fever, f/u blood cultures , UA and UCx    # COVID  - Off airborne precautions     # Pre-transplant ID w/u   - Trend ID recs for serologies   - Colonoscopy  - normal   - Chest CT  - improved LLL aeration  - f/u abdominal US    LINES:   RIJ [place] -, RAx Jacklyn -, LFA IABP  -    Ethics/Dispo: full code, maintain in CICU      Patient requires continuous monitoring with bedside rhythm monitoring, pulse ox monitoring, and intermittent blood gas analysis. Care plan discussed with ICU care team. Patient remained critical and at risk for life threatening decompensation.  Patient seen, examined and plan discussed with CCU team during rounds.     I have personally provided ____ minutes of critical care time excluding time spent on separate procedures, in addition to initial critical care time provided by the CICU Attending, Dr. Durand.    By signing my name below, I, Kalyn Sousa, attest that this documentation has been prepared under the direction and in the presence of KARYN Washburn.  Electronically signed: Rosalba Booth, 23 @ 20:05    I, Laura Mcclendon , personally performed the services described in this documentation. all medical record entries made by the eileenibmartha were at my direction and in my presence. I have reviewed the chart and agree that the record reflects my personal performance and is accurate and complete  Electronically signed: KARYN Washburn.       LD VALENCIA  MRN-[id number]  Patient is a 58y old  Male who presents with a chief complaint of CP/SOB (2023 06:32)    HPI:  57yo M w/ hx HTN, CAD w/ 1 stent in , ICH () presenting with abn ekg. Patient presented to [facility] where he was found to have STEMI, recommended to get cath however patient did not want to get it there so it left and came here.  Patient initially had cough, congestion, fever, was placed on antibiotics on .  Started feeling nauseous and had a presyncopal event after which he presented to ED last night.  Had chest pain as well.  Chest pain is midsternal.  Not currently having chest pain.  Received 4 aspirin 30 min pta. (2023 15:11)    24 HOUR EVENTS:  - s/p CT A/P without intraabdominal pathology    ICU Vital Signs Last 24 Hrs  T(C): 36.9 (2023 19:00), Max: 37.1 (2023 05:00)  T(F): 98.4 (2023 19:00), Max: 98.7 (2023 05:00)  HR: 79 (2023 19:00) (72 - 83)  BP: 121/59 (2023 08:00) (121/59 - 121/59)  BP(mean): 77 (2023 08:00) (77 - 77)  RR: 20 (2023 19:00) (16 - 35)  SpO2: 95% (2023 19:00) (94% - 97%)    O2 Parameters below as of 2023 19:00  Patient On (Oxygen Delivery Method): room air    I&O's Summary    2023 07:01  -  2023 07:00  --------------------------------------------------------  IN: 1408 mL / OUT: 1885 mL / NET: -477 mL    2023 07:01  -  2023 20:05  --------------------------------------------------------  IN: 1124 mL / OUT: 700 mL / NET: 424 mL    CAPILLARY BLOOD GLUCOSE  POCT Blood Glucose.: 261 mg/dL (2023 15:46)    PHYSICAL EXAM:  GENERAL: No acute distress, well-developed  CHEST/LUNG: CTAB; No wheezes, rales, or rhonchi  HEART: Regular rate and rhythm. Normal S1/S2. No murmurs, rubs, or gallops  ABDOMEN: Soft, non-tender, non-distended  EXTREMITIES: No clubbing, cyanosis, or edema  NEUROLOGY: A&O x 3, no focal deficits    ============================I/O===========================   I&O's Detail    2023 07:  -  2023 07:00  --------------------------------------------------------  IN:    Heparin: 288 mL    IV PiggyBack: 100 mL    Oral Fluid: 1020 mL  Total IN: 1408 mL    OUT:    Voided (mL): 1885 mL  Total OUT: 1885 mL    Total NET: -477 mL      2023 07:01  -  2023 20:05  --------------------------------------------------------  IN:    Heparin: 144 mL    IV PiggyBack: 500 mL    Oral Fluid: 480 mL  Total IN: 1124 mL    OUT:    Voided (mL): 700 mL  Total OUT: 700 mL    Total NET: 424 mL    ============================ LABS =========================                        11.6   12.78 )-----------( 270      ( 2023 02:54 )             34.2         131<L>  |  100  |  34<H>  ----------------------------<  163<H>  4.8   |  18<L>  |  1.65<H>    Ca    9.7      2023 02:54  Phos  3.3       Mg     2.0         TPro  7.9  /  Alb  3.9  /  TBili  0.6  /  DBili  x   /  AST  21  /  ALT  45  /  AlkPhos  64      LIVER FUNCTIONS - ( 2023 02:54 )  Alb: 3.9 g/dL / Pro: 7.9 g/dL / ALK PHOS: 64 U/L / ALT: 45 U/L / AST: 21 U/L / GGT: x           PT/INR - ( 2023 02:54 )   PT: 13.2 sec;   INR: 1.27 ratio         PTT - ( 2023 02:54 )  PTT:70.3 sec  ABG - ( 2023 00:13 )  pH, Arterial: 7.40  pH, Blood: x     /  pCO2: 30    /  pO2: 110   / HCO3: 19    / Base Excess: -5.2  /  SaO2: 99.6      Blood Gas Arterial, Lactate: 0.8 mmol/L (23 @ 00:13)  Lactate, Blood: 0.8 mmol/L (23 @ 01:45)    Urinalysis Basic - ( 2023 02:54 )    Color: x / Appearance: x / SG: x / pH: x  Gluc: 163 mg/dL / Ketone: x  / Bili: x / Urobili: x   Blood: x / Protein: x / Nitrite: x   Leuk Esterase: x / RBC: x / WBC x   Sq Epi: x / Non Sq Epi: x / Bacteria: x    ======================Micro/Rad/Cardio=================  Telemtry: Reviewed   EKG: Reviewed  CXR: Reviewed  Culture: Reviewed   Echo:   Cath: Cardiac Cath Lab - Adult:   [facility]  Department of Cardiology  [address] [place] [address]  [phone]  Cath Lab Report -- Comprehensive Report  Patient: LD VALENCIA  Study date: 2017  Account number: [id number]  MR number: [id number]  : 1965  Gender: Male  Race: W  Case Physician(s):  Jairon Holguin M.D.  Referring Physician:  Luc Lynn M.D.  INDICATIONS: Unstable angina - CCS4.  HISTORY: The patient has a history of coronary artery disease. The patient  hashypertension and medication-treated dyslipidemia.  PROCEDURE:  --  Left heart catheterization with ventriculography.  --  Left coronary angiography.  --  Right coronary angiography.  TECHNIQUE: The risks and alternatives of the procedures and conscious  sedation were explained to the patient and informed consent was obtained.  Cardiac catheterization performed electively.  Local anesthetic given. Right radial artery access. A 6FR PRELUDE KIT was  inserted in the vessel. Left heart catheterization. Ventriculography was  performed. A 5FR FR4.0 EXPO catheter was utilized. Left coronary artery  angiography. The vessel was injected utilizing a 5FR FL3.5 EXPO catheter.  Right coronary artery angiography. The vessel was injected utilizing a 5FR  FR4.0 EXPO catheter. RADIATION EXPOSURE: 1.1 min.  CONTRAST GIVEN: Omnipaque 55 ml.  MEDICATIONS GIVEN: Midazolam, 1 mg, IV. Fentanyl, 25 mcg, IV. Verapamil  (Isoptin, Calan, Covera), 2.5 mg, IA. Heparin, 3000 units, IA.  VENTRICLES: Global left ventricular function was moderately depressed. EF  estimated was 40 %.  CORONARY VESSELS: The coronary circulation is right dominant.  LM:   --  LM: Normal.  LAD:   --  Proximal LAD: There was a 50 % stenosis.  CX:   --  Circumflex: Normal.  RCA:   --  Mid RCA: There was a 40 % stenosis.  --  Distal RCA: There was a 50 % stenosis.  COMPLICATIONS: There were no complications.  DIAGNOSTIC RECOMMENDATIONS: The patient should continue with the present  medications.  Prepared and signed by  Jairon Holguin M.D.  Signed 2017 12:20:13  HEMODYNAMIC TABLES  Pressures:  Baseline/ Room Air  Pressures:  - HR: 78  Pressures:  - Rhythm:  Pressures:  -- Aortic Pressure (S/D/M): --/--/99  Pressures:  -- Left Ventricle (s/edp): 157/39/--  Outputs:  Baseline/ Room Air  Outputs:  -- CALCULATIONS[age]  Outputs:  -- CALCULATIONS: Body Surface Area: 2.05  Outputs:  -- CALCULATIONS: Height in cm: 175.00  Outputs:  -- CALCULATIONS: Sex: Male  Outputs:  -- CALCULATIONS: Weight in k.40 (17 @ 21:55)    ======================================================  PAST MEDICAL & SURGICAL HISTORY:  HTN (hypertension)      CAD (coronary artery disease)  ; stent      Intracranial hemorrhage        Respiratory arrest        Myocardial infarction, unspecified MI type, unspecified artery      History of coronary artery stent placement    ====================ASSESSMENT ==============  58 male with HTN, CAD (s/p PCI ), HFrEF, CVA , and T2DM presenting with chest pressure and unknown tachycardia that was shocked x1, [facility]  found to have in-stent restenosis of pLAD and  of RCA with elevated RA and PA pressures and severely decreased EF 32%, admitted to CICU for management of cardiogenic shock requiring IABP  -, with hospital course c/b vfib arrest requiring reinsertion of IABP. Currently undergoing workup for AT evaluation with HF.    Plan:  ====================== NEUROLOGY=====================  # Anxiety  - Ativan 0.5 mg PO Q 6hrs PRN   - No Seroquel/antipsychotics since vfib arrest and prolonged QTC  - Psych Eval last week, recs for continuing Ativan PRN and had recommended SSRI, but pt. refused   - Needs psych eval for tx     # Pain 2/2 rib fractures post CPR  - c/w multimodal pain control w/ tylenol and lidoderm TD    ==================== RESPIRATORY======================  # Acute Hypoxemic Respiratory Failure  - s/p x2 intubations for cardiogenic pulm edema and the in setting of cardiac arrest, resolved - extubated 11/10  - Currently on room air with spO2 mid 90s  - Continue incentive spirometry and monitoring of sp02    # Asthma  - c/w albuterol, symbicort and spiriva  - on trelegy at home    ====================CARDIOVASCULAR==================  # Vfib arrest i/s/o ischemia vs. cardiogenic shock  - Lido gtt off since   - PO Amio load dose (s/p IV amio ~1950mg IV gtt) for total of 5 grams per EP to complete   - Keep K > 4, Mag > 2.2     # Cardiogenic shock requiring IABP (- , -)  - Likely 2/2 NSTEMI and ADHF  -  LHC: pLAD 100 % in-stent restenosis & mRCA, 100 %. PCWP 30. IABP placed.  -  TTE: LV dilated. EF 32 %. Regional WMAs present, mod (grade 2) LV diastolic dysfunction  -  TTE: EF 22% and + LV thrombus   - IABP removed , vfib , IABP later reinserted that day for CS. continue IABP 1:1  - Off Milrinone gtt @ 7:30 am   - Currently on hydralazine 100 TID and ISD 40 TID for AL reduction  - Holding diuretics at this time, PRN Bumex IVP as needed  - Continue romel 25 daily and coreg 25 BID for GDMT  - AT Eval, F/u HF recs    # NSTEMI iso stent re-occlusion of pLAD and 100%  of RCA  - EKG on admission w/LBBB  - DAPT: c/w ASA, Brilinta d/c'd per transplant w/u  - c/w lipitor 80  - cMR deferred given necessity of IABP  - CT sx not recommending CABG, undergoing AT eval    # LV thrombus  - c/w heparin gtt    ===================HEMATOLOGIC/ONC ===================  # VTE prophylaxis   - c/w heparin gtt given LV Thrombus and IABP   - H/H and platelets stable  - Continue to monitor daily    RIJ ?hematoma  - US ordered, r/o clot vs hematoma  - currently on heparin gtt    ===================== RENAL =========================  # Non-oliguric ARIC   - sCr stable, Baseline Cr: 1-1.22  - Diuresis held, optimize pre-renal factors for optimal perfusion and volume status  - Trend BMP, lytes daily, replace as needed  - Continue Strict I/Os, avoid nephrotoxins    ==================== GASTROINTESTINAL===================  DASH diet  - Continue to monitor for BM    GERD  - c/w home protonix    =======================    ENDOCRINE  =====================  # Type 2 DM  - A1c 8.3  - Insulin gtt transitioned off  AM  - Continue lantus 8, premeal 8, low ISS    ========================INFECTIOUS DISEASE================  # Enterococcus faecalis bacteremia  - Concern for aspiration event prior to intubation on admission - s/p vanco and cefepime course  - BCx 11/10 NGTD, RVP neg, MRSA neg, UA neg  - BCx + for enterococcus, started on Vanco (by level)   - Plan for IABP swap  as per ID   - Mild fluctuations in WBCs w/o fever, f/u blood cultures , UA and UCx    # COVID, resolved  - Off airborne precautions     # Pre-transplant ID w/u   - Trend ID recs for serologies   - Colonoscopy  - normal   - Chest CT  - improved LLL aeration  - f/u abdominal US    LINES:   RICANDY Roberts -, RAx Jacklyn -, LFA IABP  -    Ethics/Dispo: full code, maintain in CICU      Patient requires continuous monitoring with bedside rhythm monitoring, pulse ox monitoring, and intermittent blood gas analysis. Care plan discussed with ICU care team. Patient remained critical and at risk for life threatening decompensation.  Patient seen, examined and plan discussed with CCU team during rounds.     I have personally provided 30 minutes of critical care time excluding time spent on separate procedures, in addition to initial critical care time provided by the CICU Attending, Dr. Durand.    By signing my name below, I, Kalyn Sousa, attest that this documentation has been prepared under the direction and in the presence of KARYN Washburn.  Electronically signed: Sangita Booth, 23 @ 20:05    I, Laura Mcclendon , personally performed the services described in this documentation. all medical record entries made by the sangita were at my direction and in my presence. I have reviewed the chart and agree that the record reflects my personal performance and is accurate and complete  Electronically signed: KARYN Washburn.

## 2023-11-19 NOTE — PROGRESS NOTE ADULT - SUBJECTIVE AND OBJECTIVE BOX
Subjective:  - no acute events overnight  - reports feeling well without complaints    Medications:  aMIOdarone    Tablet 200 milliGRAM(s) Oral daily  aMIOdarone    Tablet   Oral   aspirin  chewable 81 milliGRAM(s) Oral daily  atorvastatin 80 milliGRAM(s) Oral at bedtime  budesonide  80 MICROgram(s)/formoterol 4.5 MICROgram(s) Inhaler 2 Puff(s) Inhalation two times a day  carvedilol 25 milliGRAM(s) Oral every 12 hours  chlorhexidine 2% Cloths 1 Application(s) Topical daily  dextrose 50% Injectable 25 Gram(s) IV Push once  dextrose 50% Injectable 12.5 Gram(s) IV Push once  heparin  Infusion 1600 Unit(s)/Hr IV Continuous <Continuous>  hydrALAZINE 100 milliGRAM(s) Oral three times a day  insulin glargine Injectable (LANTUS) 12 Unit(s) SubCutaneous at bedtime  insulin lispro (ADMELOG) corrective regimen sliding scale   SubCutaneous at bedtime  insulin lispro (ADMELOG) corrective regimen sliding scale   SubCutaneous three times a day before meals  insulin lispro Injectable (ADMELOG) 8 Unit(s) SubCutaneous three times a day before meals  isosorbide   dinitrate Tablet (ISORDIL) 40 milliGRAM(s) Oral three times a day  spironolactone 25 milliGRAM(s) Oral daily  vancomycin  IVPB 1500 milliGRAM(s) IV Intermittent once      Physical Exam:    Vitals:  Vital Signs Last 24 Hours  T(C): 36.7 (23 @ 08:00), Max: 37.3 (23 @ 14:00)  HR: 76 (23 @ 11:00) (72 - 82)  BP: 121/59 (23 @ 08:00) (121/59 - 121/59); IABP 1:1 assisted means 80-95, aug diastolics 114-120  RR: 20 (23 @ 11:00) (16 - 35)  SpO2: 96% (23 @ 11:00) (94% - 99%)    Weight in k.8 ( @ 05:00)    I&O's Summary    2023 07:01  -  2023 07:00  --------------------------------------------------------  IN: 1408 mL / OUT: 1885 mL / NET: -477 mL    2023 07:01  -  2023 11:45  --------------------------------------------------------  IN: 288 mL / OUT: 400 mL / NET: -112 mL    Tele: SR    General: No distress. Comfortable.  HEENT: EOM intact.  Neck: Neck supple. JVP not elevated. No masses  Chest: Clear to auscultation bilaterally  CV: Normal S1 and S2. No murmurs, rub, or gallops. Radial pulses normal. No LE edema  Abdomen: Soft, non-distended, non-tender  Skin: No rashes or skin breakdown  Neurology: Alert and oriented times three. Sensation intact  Psych: Affect normal    Labs:                        11.6   12.78 )-----------( 270      ( 2023 02:54 )             34.2         131<L>  |  100  |  34<H>  ----------------------------<  163<H>  4.8   |  18<L>  |  1.65<H>    Ca    9.7      2023 02:54  Phos  3.3       Mg     2.0         TPro  7.9  /  Alb  3.9  /  TBili  0.6  /  DBili  x   /  AST  21  /  ALT  45  /  AlkPhos  64      PT/INR - ( 2023 02:54 )   PT: 13.2 sec;   INR: 1.27 ratio         PTT - ( 2023 02:54 )  PTT:70.3 sec            Lactate, Blood: 0.8 mmol/L ( @ 01:45)

## 2023-11-19 NOTE — PROGRESS NOTE ADULT - SUBJECTIVE AND OBJECTIVE BOX
Follow Up:  Pre-OHT    Interval History: blood cultures resulted with enterococcus faecalis and Staph epi. afebrile.     REVIEW OF SYSTEMS  [  ] ROS unobtainable because:    [ x ] All other systems negative except as noted below    Constitutional:  [ ] fever [ ] chills  [ ] weight loss  [ ] weakness  Skin:  [ ] rash [ ] phlebitis	  Eyes: [ ] icterus [ ] pain  [ ] discharge	  ENMT: [ ] sore throat  [ ] thrush [ ] ulcers [ ] exudates  Respiratory: [ ] dyspnea [ ] hemoptysis [ ] cough [ ] sputum	  Cardiovascular:  [ ] chest pain [ ] palpitations [ ] edema	  Gastrointestinal:  [ ] nausea [ ] vomiting [ ] diarrhea [ ] constipation [ ] pain	  Genitourinary:  [ ] dysuria [ ] frequency [ ] hematuria [ ] discharge [ ] flank pain  [ ] incontinence  Musculoskeletal:  [ ] myalgias [ ] arthralgias [ ] arthritis  [ ] back pain  Neurological:  [ ] headache [ ] seizures  [ ] confusion/altered mental status    Allergies  penicillins (Unknown)        ANTIMICROBIALS:      OTHER MEDS:  MEDICATIONS  (STANDING):  aMIOdarone    Tablet 200 daily  aMIOdarone    Tablet    aspirin  chewable 81 daily  atorvastatin 80 at bedtime  budesonide  80 MICROgram(s)/formoterol 4.5 MICROgram(s) Inhaler 2 two times a day  carvedilol 25 every 12 hours  dextrose 50% Injectable 25 once  dextrose 50% Injectable 12.5 once  heparin  Infusion 1600 <Continuous>  hydrALAZINE 100 three times a day  insulin glargine Injectable (LANTUS) 12 at bedtime  insulin lispro (ADMELOG) corrective regimen sliding scale  three times a day before meals  insulin lispro (ADMELOG) corrective regimen sliding scale  at bedtime  insulin lispro Injectable (ADMELOG) 8 three times a day before meals  isosorbide   dinitrate Tablet (ISORDIL) 40 three times a day  spironolactone 25 daily      Vital Signs Last 24 Hrs  T(C): 36.7 (19 Nov 2023 13:00), Max: 37.1 (18 Nov 2023 19:00)  T(F): 98 (19 Nov 2023 13:00), Max: 98.8 (18 Nov 2023 19:00)  HR: 81 (19 Nov 2023 15:00) (72 - 81)  BP: 121/59 (19 Nov 2023 08:00) (121/59 - 121/59)  BP(mean): 77 (19 Nov 2023 08:00) (77 - 77)  RR: 30 (19 Nov 2023 15:00) (16 - 35)  SpO2: 97% (19 Nov 2023 15:00) (94% - 97%)    Parameters below as of 19 Nov 2023 09:00  Patient On (Oxygen Delivery Method): room air    PHYSICAL EXAMINATION:  General: Alert and Awake, NAD  HEENT: PERRL, EOMI  Neck: Supple  Cardiac: RRR, No M/R/G  Resp: CTAB, No Wh/Rh/Ra  Abdomen: NBS, NT/ND, No HSM, No rigidity or guarding  MSK: No LE edema. No Calf tenderness  : No holt  Vasc: L Groin Balloon Pump  Skin: No rashes or lesions. Skin is warm and dry to the touch.   Neuro: Alert and Awake. CN 2-12 Grossly intact. Moves all four extremities spontaneously.  Psych: Calm, Pleasant, Cooperative                            11.6   12.78 )-----------( 270      ( 19 Nov 2023 02:54 )             34.2       11-19    131<L>  |  100  |  34<H>  ----------------------------<  163<H>  4.8   |  18<L>  |  1.65<H>    Ca    9.7      19 Nov 2023 02:54  Phos  3.3     11-19  Mg     2.0     11-19    TPro  7.9  /  Alb  3.9  /  TBili  0.6  /  DBili  x   /  AST  21  /  ALT  45  /  AlkPhos  64  11-19      Urinalysis Basic - ( 19 Nov 2023 02:54 )    Color: x / Appearance: x / SG: x / pH: x  Gluc: 163 mg/dL / Ketone: x  / Bili: x / Urobili: x   Blood: x / Protein: x / Nitrite: x   Leuk Esterase: x / RBC: x / WBC x   Sq Epi: x / Non Sq Epi: x / Bacteria: x        MICROBIOLOGY:  Vancomycin Level, Random: 12.9 ug/mL (11-19-23 @ 02:54)  v  .Blood Blood  11-17-23   Growth in anaerobic bottle: Enterococcus faecalis  See previous culture [id number]  Growth in aerobic bottle: Gram Positive Cocci in Pairs and Chains  --    Growth in anaerobic bottle: Gram Positive Cocci in Pairs and Chains  Growth in aerobic bottle: Gram Positive Cocci in Pairs and Chains      .Blood Blood  11-17-23   Growth in aerobic bottle: Enterococcus faecalis  Growth in anaerobic bottle: Gram Positive Cocci in Clusters  Direct identification is available within approximately 3-5  hours either by Blood Panel Multiplexed PCR or Direct  MALDI-TOF. Details: [email or web address]  --  Blood Culture PCR  Blood Culture PCR      .Blood Blood-Peripheral  11-10-23   No growth at 5 days  --  --      .Blood Blood-Peripheral  11-04-23   No growth at 5 days  --  --      ET Tube ET Tube  11-02-23   No growth to date.  --    Rare polymorphonuclear leukocytes per low power field  No Squamous epithelial cells per low power field  No organisms seen per oil power field      .Blood Blood  11-02-23   No growth at 5 days  --  --      .Blood Blood  11-02-23   No growth at 5 days  --  --        CMV IgG Antibody: 4.20 U/mL (11-10-23 @ 17:29)  Toxoplasma IgG Screen: <3.0 IU/mL (11-10-23 @ 17:29)  HIV-1 RNA Quantitative, Viral Load Log: NOT DET. lg /mL (11-10-23 @ 17:06)          RADIOLOGY:    <The imaging below has been reviewed and visualized by me independently. Findings as detailed in report below>    < from: CT Abdomen and Pelvis No Cont (11.18.23 @ 20:46) >  IMPRESSION:  .    No evidence of intra-abdominal pathology    < end of copied text >

## 2023-11-20 LAB
-  AMPICILLIN: SIGNIFICANT CHANGE UP
-  AMPICILLIN: SIGNIFICANT CHANGE UP
-  GENTAMICIN SYNERGY: SIGNIFICANT CHANGE UP
-  GENTAMICIN SYNERGY: SIGNIFICANT CHANGE UP
-  STREPTOMYCIN SYNERGY: SIGNIFICANT CHANGE UP
-  STREPTOMYCIN SYNERGY: SIGNIFICANT CHANGE UP
-  VANCOMYCIN: SIGNIFICANT CHANGE UP
-  VANCOMYCIN: SIGNIFICANT CHANGE UP
ALBUMIN SERPL ELPH-MCNC: 3.7 G/DL — SIGNIFICANT CHANGE UP (ref 3.3–5)
ALBUMIN SERPL ELPH-MCNC: 3.7 G/DL — SIGNIFICANT CHANGE UP (ref 3.3–5)
ALP SERPL-CCNC: 69 U/L — SIGNIFICANT CHANGE UP (ref 40–120)
ALP SERPL-CCNC: 69 U/L — SIGNIFICANT CHANGE UP (ref 40–120)
ALT FLD-CCNC: 46 U/L — HIGH (ref 10–45)
ALT FLD-CCNC: 46 U/L — HIGH (ref 10–45)
ANION GAP SERPL CALC-SCNC: 14 MMOL/L — SIGNIFICANT CHANGE UP (ref 5–17)
ANION GAP SERPL CALC-SCNC: 14 MMOL/L — SIGNIFICANT CHANGE UP (ref 5–17)
APTT BLD: 56.1 SEC — HIGH (ref 24.5–35.6)
APTT BLD: 56.1 SEC — HIGH (ref 24.5–35.6)
APTT BLD: 73.8 SEC — HIGH (ref 24.5–35.6)
APTT BLD: 73.8 SEC — HIGH (ref 24.5–35.6)
AST SERPL-CCNC: 25 U/L — SIGNIFICANT CHANGE UP (ref 10–40)
AST SERPL-CCNC: 25 U/L — SIGNIFICANT CHANGE UP (ref 10–40)
BASOPHILS # BLD AUTO: 0.06 K/UL — SIGNIFICANT CHANGE UP (ref 0–0.2)
BASOPHILS # BLD AUTO: 0.06 K/UL — SIGNIFICANT CHANGE UP (ref 0–0.2)
BASOPHILS NFR BLD AUTO: 0.5 % — SIGNIFICANT CHANGE UP (ref 0–2)
BASOPHILS NFR BLD AUTO: 0.5 % — SIGNIFICANT CHANGE UP (ref 0–2)
BILIRUB SERPL-MCNC: 0.4 MG/DL — SIGNIFICANT CHANGE UP (ref 0.2–1.2)
BILIRUB SERPL-MCNC: 0.4 MG/DL — SIGNIFICANT CHANGE UP (ref 0.2–1.2)
BUN SERPL-MCNC: 38 MG/DL — HIGH (ref 7–23)
BUN SERPL-MCNC: 38 MG/DL — HIGH (ref 7–23)
C3 SERPL-MCNC: 171 MG/DL — HIGH (ref 81–157)
C3 SERPL-MCNC: 171 MG/DL — HIGH (ref 81–157)
C4 SERPL-MCNC: 45 MG/DL — HIGH (ref 13–39)
C4 SERPL-MCNC: 45 MG/DL — HIGH (ref 13–39)
CALCIUM SERPL-MCNC: 9.8 MG/DL — SIGNIFICANT CHANGE UP (ref 8.4–10.5)
CALCIUM SERPL-MCNC: 9.8 MG/DL — SIGNIFICANT CHANGE UP (ref 8.4–10.5)
CHLORIDE SERPL-SCNC: 100 MMOL/L — SIGNIFICANT CHANGE UP (ref 96–108)
CHLORIDE SERPL-SCNC: 100 MMOL/L — SIGNIFICANT CHANGE UP (ref 96–108)
CO2 SERPL-SCNC: 16 MMOL/L — LOW (ref 22–31)
CO2 SERPL-SCNC: 16 MMOL/L — LOW (ref 22–31)
CREAT SERPL-MCNC: 1.79 MG/DL — HIGH (ref 0.5–1.3)
CREAT SERPL-MCNC: 1.79 MG/DL — HIGH (ref 0.5–1.3)
CULTURE RESULTS: ABNORMAL
CULTURE RESULTS: ABNORMAL
EGFR: 43 ML/MIN/1.73M2 — LOW
EGFR: 43 ML/MIN/1.73M2 — LOW
EOSINOPHIL # BLD AUTO: 0.24 K/UL — SIGNIFICANT CHANGE UP (ref 0–0.5)
EOSINOPHIL # BLD AUTO: 0.24 K/UL — SIGNIFICANT CHANGE UP (ref 0–0.5)
EOSINOPHIL NFR BLD AUTO: 2.1 % — SIGNIFICANT CHANGE UP (ref 0–6)
EOSINOPHIL NFR BLD AUTO: 2.1 % — SIGNIFICANT CHANGE UP (ref 0–6)
GLUCOSE BLDC GLUCOMTR-MCNC: 106 MG/DL — HIGH (ref 70–99)
GLUCOSE BLDC GLUCOMTR-MCNC: 106 MG/DL — HIGH (ref 70–99)
GLUCOSE BLDC GLUCOMTR-MCNC: 130 MG/DL — HIGH (ref 70–99)
GLUCOSE BLDC GLUCOMTR-MCNC: 130 MG/DL — HIGH (ref 70–99)
GLUCOSE BLDC GLUCOMTR-MCNC: 166 MG/DL — HIGH (ref 70–99)
GLUCOSE BLDC GLUCOMTR-MCNC: 166 MG/DL — HIGH (ref 70–99)
GLUCOSE BLDC GLUCOMTR-MCNC: 195 MG/DL — HIGH (ref 70–99)
GLUCOSE BLDC GLUCOMTR-MCNC: 195 MG/DL — HIGH (ref 70–99)
GLUCOSE BLDC GLUCOMTR-MCNC: 243 MG/DL — HIGH (ref 70–99)
GLUCOSE BLDC GLUCOMTR-MCNC: 243 MG/DL — HIGH (ref 70–99)
GLUCOSE SERPL-MCNC: 185 MG/DL — HIGH (ref 70–99)
GLUCOSE SERPL-MCNC: 185 MG/DL — HIGH (ref 70–99)
HCT VFR BLD CALC: 33.4 % — LOW (ref 39–50)
HCT VFR BLD CALC: 33.4 % — LOW (ref 39–50)
HGB BLD-MCNC: 11.2 G/DL — LOW (ref 13–17)
HGB BLD-MCNC: 11.2 G/DL — LOW (ref 13–17)
IMM GRANULOCYTES NFR BLD AUTO: 0.5 % — SIGNIFICANT CHANGE UP (ref 0–0.9)
IMM GRANULOCYTES NFR BLD AUTO: 0.5 % — SIGNIFICANT CHANGE UP (ref 0–0.9)
INR BLD: 1.15 RATIO — SIGNIFICANT CHANGE UP (ref 0.85–1.18)
INR BLD: 1.15 RATIO — SIGNIFICANT CHANGE UP (ref 0.85–1.18)
KAPPA LC SER QL IFE: 3.76 MG/DL — HIGH (ref 0.33–1.94)
KAPPA LC SER QL IFE: 3.76 MG/DL — HIGH (ref 0.33–1.94)
KAPPA/LAMBDA FREE LIGHT CHAIN RATIO, SERUM: 1.16 RATIO — SIGNIFICANT CHANGE UP (ref 0.26–1.65)
KAPPA/LAMBDA FREE LIGHT CHAIN RATIO, SERUM: 1.16 RATIO — SIGNIFICANT CHANGE UP (ref 0.26–1.65)
LAMBDA LC SER QL IFE: 3.25 MG/DL — HIGH (ref 0.57–2.63)
LAMBDA LC SER QL IFE: 3.25 MG/DL — HIGH (ref 0.57–2.63)
LYMPHOCYTES # BLD AUTO: 1.86 K/UL — SIGNIFICANT CHANGE UP (ref 1–3.3)
LYMPHOCYTES # BLD AUTO: 1.86 K/UL — SIGNIFICANT CHANGE UP (ref 1–3.3)
LYMPHOCYTES # BLD AUTO: 16.6 % — SIGNIFICANT CHANGE UP (ref 13–44)
LYMPHOCYTES # BLD AUTO: 16.6 % — SIGNIFICANT CHANGE UP (ref 13–44)
MAGNESIUM SERPL-MCNC: 1.9 MG/DL — SIGNIFICANT CHANGE UP (ref 1.6–2.6)
MAGNESIUM SERPL-MCNC: 1.9 MG/DL — SIGNIFICANT CHANGE UP (ref 1.6–2.6)
MCHC RBC-ENTMCNC: 29.1 PG — SIGNIFICANT CHANGE UP (ref 27–34)
MCHC RBC-ENTMCNC: 29.1 PG — SIGNIFICANT CHANGE UP (ref 27–34)
MCHC RBC-ENTMCNC: 33.5 GM/DL — SIGNIFICANT CHANGE UP (ref 32–36)
MCHC RBC-ENTMCNC: 33.5 GM/DL — SIGNIFICANT CHANGE UP (ref 32–36)
MCV RBC AUTO: 86.8 FL — SIGNIFICANT CHANGE UP (ref 80–100)
MCV RBC AUTO: 86.8 FL — SIGNIFICANT CHANGE UP (ref 80–100)
METHOD TYPE: SIGNIFICANT CHANGE UP
METHOD TYPE: SIGNIFICANT CHANGE UP
MONOCYTES # BLD AUTO: 1.14 K/UL — HIGH (ref 0–0.9)
MONOCYTES # BLD AUTO: 1.14 K/UL — HIGH (ref 0–0.9)
MONOCYTES NFR BLD AUTO: 10.2 % — SIGNIFICANT CHANGE UP (ref 2–14)
MONOCYTES NFR BLD AUTO: 10.2 % — SIGNIFICANT CHANGE UP (ref 2–14)
NEUTROPHILS # BLD AUTO: 7.86 K/UL — HIGH (ref 1.8–7.4)
NEUTROPHILS # BLD AUTO: 7.86 K/UL — HIGH (ref 1.8–7.4)
NEUTROPHILS NFR BLD AUTO: 70.1 % — SIGNIFICANT CHANGE UP (ref 43–77)
NEUTROPHILS NFR BLD AUTO: 70.1 % — SIGNIFICANT CHANGE UP (ref 43–77)
NRBC # BLD: 0 /100 WBCS — SIGNIFICANT CHANGE UP (ref 0–0)
NRBC # BLD: 0 /100 WBCS — SIGNIFICANT CHANGE UP (ref 0–0)
ORGANISM # SPEC MICROSCOPIC CNT: ABNORMAL
PHOSPHATE SERPL-MCNC: 3.5 MG/DL — SIGNIFICANT CHANGE UP (ref 2.5–4.5)
PHOSPHATE SERPL-MCNC: 3.5 MG/DL — SIGNIFICANT CHANGE UP (ref 2.5–4.5)
PLATELET # BLD AUTO: 274 K/UL — SIGNIFICANT CHANGE UP (ref 150–400)
PLATELET # BLD AUTO: 274 K/UL — SIGNIFICANT CHANGE UP (ref 150–400)
POTASSIUM SERPL-MCNC: 4.5 MMOL/L — SIGNIFICANT CHANGE UP (ref 3.5–5.3)
POTASSIUM SERPL-MCNC: 4.5 MMOL/L — SIGNIFICANT CHANGE UP (ref 3.5–5.3)
POTASSIUM SERPL-SCNC: 4.5 MMOL/L — SIGNIFICANT CHANGE UP (ref 3.5–5.3)
POTASSIUM SERPL-SCNC: 4.5 MMOL/L — SIGNIFICANT CHANGE UP (ref 3.5–5.3)
PROCALCITONIN SERPL-MCNC: 0.11 NG/ML — HIGH (ref 0.02–0.1)
PROCALCITONIN SERPL-MCNC: 0.11 NG/ML — HIGH (ref 0.02–0.1)
PROT SERPL-MCNC: 7.6 G/DL — SIGNIFICANT CHANGE UP (ref 6–8.3)
PROT SERPL-MCNC: 7.6 G/DL — SIGNIFICANT CHANGE UP (ref 6–8.3)
PROTHROM AB SERPL-ACNC: 12.6 SEC — SIGNIFICANT CHANGE UP (ref 9.5–13)
PROTHROM AB SERPL-ACNC: 12.6 SEC — SIGNIFICANT CHANGE UP (ref 9.5–13)
RBC # BLD: 3.85 M/UL — LOW (ref 4.2–5.8)
RBC # BLD: 3.85 M/UL — LOW (ref 4.2–5.8)
RBC # FLD: 14 % — SIGNIFICANT CHANGE UP (ref 10.3–14.5)
RBC # FLD: 14 % — SIGNIFICANT CHANGE UP (ref 10.3–14.5)
SODIUM SERPL-SCNC: 130 MMOL/L — LOW (ref 135–145)
SODIUM SERPL-SCNC: 130 MMOL/L — LOW (ref 135–145)
SPECIMEN SOURCE: SIGNIFICANT CHANGE UP
SPECIMEN SOURCE: SIGNIFICANT CHANGE UP
VANCOMYCIN FLD-MCNC: 14.8 UG/ML — SIGNIFICANT CHANGE UP
VANCOMYCIN FLD-MCNC: 14.8 UG/ML — SIGNIFICANT CHANGE UP
WBC # BLD: 11.22 K/UL — HIGH (ref 3.8–10.5)
WBC # BLD: 11.22 K/UL — HIGH (ref 3.8–10.5)
WBC # FLD AUTO: 11.22 K/UL — HIGH (ref 3.8–10.5)
WBC # FLD AUTO: 11.22 K/UL — HIGH (ref 3.8–10.5)

## 2023-11-20 PROCEDURE — 93010 ELECTROCARDIOGRAM REPORT: CPT

## 2023-11-20 PROCEDURE — 33967 INSERT I-AORT PERCUT DEVICE: CPT

## 2023-11-20 PROCEDURE — 99152 MOD SED SAME PHYS/QHP 5/>YRS: CPT

## 2023-11-20 PROCEDURE — 99292 CRITICAL CARE ADDL 30 MIN: CPT

## 2023-11-20 PROCEDURE — 99232 SBSQ HOSP IP/OBS MODERATE 35: CPT | Mod: GC

## 2023-11-20 PROCEDURE — 71045 X-RAY EXAM CHEST 1 VIEW: CPT | Mod: 26

## 2023-11-20 PROCEDURE — 93971 EXTREMITY STUDY: CPT | Mod: 26,RT

## 2023-11-20 PROCEDURE — 99291 CRITICAL CARE FIRST HOUR: CPT | Mod: 25

## 2023-11-20 PROCEDURE — 99233 SBSQ HOSP IP/OBS HIGH 50: CPT

## 2023-11-20 PROCEDURE — 71045 X-RAY EXAM CHEST 1 VIEW: CPT | Mod: 26,77

## 2023-11-20 RX ORDER — POLYETHYLENE GLYCOL 3350 17 G/17G
17 POWDER, FOR SOLUTION ORAL
Refills: 0 | Status: DISCONTINUED | OUTPATIENT
Start: 2023-11-20 | End: 2023-12-18

## 2023-11-20 RX ORDER — VANCOMYCIN HCL 1 G
1500 VIAL (EA) INTRAVENOUS ONCE
Refills: 0 | Status: COMPLETED | OUTPATIENT
Start: 2023-11-20 | End: 2023-11-20

## 2023-11-20 RX ORDER — FENTANYL CITRATE 50 UG/ML
25 INJECTION INTRAVENOUS ONCE
Refills: 0 | Status: DISCONTINUED | OUTPATIENT
Start: 2023-11-20 | End: 2023-11-20

## 2023-11-20 RX ORDER — SENNA PLUS 8.6 MG/1
2 TABLET ORAL AT BEDTIME
Refills: 0 | Status: DISCONTINUED | OUTPATIENT
Start: 2023-11-20 | End: 2023-12-25

## 2023-11-20 RX ORDER — VANCOMYCIN HCL 1 G
1500 VIAL (EA) INTRAVENOUS EVERY 24 HOURS
Refills: 0 | Status: DISCONTINUED | OUTPATIENT
Start: 2023-11-21 | End: 2023-11-22

## 2023-11-20 RX ORDER — MAGNESIUM SULFATE 500 MG/ML
2 VIAL (ML) INJECTION ONCE
Refills: 0 | Status: COMPLETED | OUTPATIENT
Start: 2023-11-20 | End: 2023-11-20

## 2023-11-20 RX ADMIN — Medication 25 GRAM(S): at 03:53

## 2023-11-20 RX ADMIN — BUDESONIDE AND FORMOTEROL FUMARATE DIHYDRATE 2 PUFF(S): 160; 4.5 AEROSOL RESPIRATORY (INHALATION) at 20:58

## 2023-11-20 RX ADMIN — Medication 300 MILLIGRAM(S): at 14:44

## 2023-11-20 RX ADMIN — INSULIN GLARGINE 12 UNIT(S): 100 INJECTION, SOLUTION SUBCUTANEOUS at 22:08

## 2023-11-20 RX ADMIN — BUDESONIDE AND FORMOTEROL FUMARATE DIHYDRATE 2 PUFF(S): 160; 4.5 AEROSOL RESPIRATORY (INHALATION) at 10:04

## 2023-11-20 RX ADMIN — Medication 8 UNIT(S): at 08:20

## 2023-11-20 RX ADMIN — Medication 0.5 MILLIGRAM(S): at 17:45

## 2023-11-20 RX ADMIN — HEPARIN SODIUM 13 UNIT(S)/HR: 5000 INJECTION INTRAVENOUS; SUBCUTANEOUS at 22:08

## 2023-11-20 RX ADMIN — FENTANYL CITRATE 25 MICROGRAM(S): 50 INJECTION INTRAVENOUS at 16:05

## 2023-11-20 RX ADMIN — AMIODARONE HYDROCHLORIDE 200 MILLIGRAM(S): 400 TABLET ORAL at 05:35

## 2023-11-20 RX ADMIN — ATORVASTATIN CALCIUM 80 MILLIGRAM(S): 80 TABLET, FILM COATED ORAL at 22:06

## 2023-11-20 RX ADMIN — Medication 100 MILLIGRAM(S): at 22:07

## 2023-11-20 RX ADMIN — CHLORHEXIDINE GLUCONATE 1 APPLICATION(S): 213 SOLUTION TOPICAL at 22:09

## 2023-11-20 RX ADMIN — ISOSORBIDE DINITRATE 40 MILLIGRAM(S): 5 TABLET ORAL at 11:42

## 2023-11-20 RX ADMIN — Medication 100 MILLIGRAM(S): at 16:46

## 2023-11-20 RX ADMIN — HEPARIN SODIUM 13 UNIT(S)/HR: 5000 INJECTION INTRAVENOUS; SUBCUTANEOUS at 01:38

## 2023-11-20 RX ADMIN — FENTANYL CITRATE 25 MICROGRAM(S): 50 INJECTION INTRAVENOUS at 16:35

## 2023-11-20 RX ADMIN — ISOSORBIDE DINITRATE 40 MILLIGRAM(S): 5 TABLET ORAL at 05:35

## 2023-11-20 RX ADMIN — HEPARIN SODIUM 13 UNIT(S)/HR: 5000 INJECTION INTRAVENOUS; SUBCUTANEOUS at 08:20

## 2023-11-20 RX ADMIN — Medication 81 MILLIGRAM(S): at 11:42

## 2023-11-20 RX ADMIN — Medication 1: at 08:20

## 2023-11-20 RX ADMIN — CARVEDILOL PHOSPHATE 25 MILLIGRAM(S): 80 CAPSULE, EXTENDED RELEASE ORAL at 05:35

## 2023-11-20 RX ADMIN — CARVEDILOL PHOSPHATE 25 MILLIGRAM(S): 80 CAPSULE, EXTENDED RELEASE ORAL at 18:31

## 2023-11-20 RX ADMIN — Medication 100 MILLIGRAM(S): at 05:35

## 2023-11-20 RX ADMIN — Medication 8 UNIT(S): at 11:43

## 2023-11-20 RX ADMIN — SPIRONOLACTONE 25 MILLIGRAM(S): 25 TABLET, FILM COATED ORAL at 05:36

## 2023-11-20 RX ADMIN — Medication 1: at 20:08

## 2023-11-20 RX ADMIN — ISOSORBIDE DINITRATE 40 MILLIGRAM(S): 5 TABLET ORAL at 17:53

## 2023-11-20 NOTE — PROGRESS NOTE ADULT - ATTENDING COMMENTS
57 yo man ARIC  Hx HFrEF (EF 32%), CAD s/p PCI (2008) DMT2 (A1c 8.3%) and CVA   Recently treated for PNA   Now syncope reportedly requiring defibrilation. Treated for ACS   Pt has covid and was placed on IABP for hypotension.   Being evaluated for Heart transplant Vs LVAD placement.  Renal function stable- will optimize

## 2023-11-20 NOTE — PROGRESS NOTE ADULT - PROBLEM SELECTOR PLAN 1
- L IABP at 1:1, placed 11/9. On AC with Heparin infusion (also for LV thrombus); plan for IABP to be exchanged given high grade bacteremia per discussion with ID  - Currently on Coreg 25 mg BID ?? hold for MAP <65  - Continue HDZN 100 mg TID and ISDN 40 mg TID, hold for MAP <65  - Continue Spironolactone 25 mg QD  - PRN diuretics to target net even fluid balance  - AT evaluation: launched 11/10.   GI on board, s/p bedside colonoscopy; No masses or polyps found. Cleared from GI standpoint.   Appreciate cardiorenal c/s   As per hepatology, US Elastography demonstrates minimal risk of clinically significant fibrosis.   Please obtain Abd US/doppler to assess patency of hepatic and portal veins as requested on 11/13. PENDING  If doppler is negative, there is no hepatic contraindication or further evaluation needed for advanced heart failure therapy.    S/p CT chest with improved LLL aeration.    cPRA 0% 11/13  Last Brilinta dose was on 11/13  Will need TxID clearance prior to listing  - Please keep primary Dr. Banuelos [phone] in the loop per family request.

## 2023-11-20 NOTE — PROGRESS NOTE ADULT - SUBJECTIVE AND OBJECTIVE BOX
[facility] Division of Kidney Diseases & Hypertension  FOLLOW UP NOTE  [phone]--------------------------------------------------------------------------------    Chief Complaint: ARIC     24 hour events/subjective:  Olga seen in CICU today, doing well. Has no acute complaints. Remains on IABP. Scr stable. Denies any headaches, fevers/chills, chest pain, palpitations, SOB, and leg swelling.    PAST HISTORY  --------------------------------------------------------------------------------  No significant changes to PMH, PSH, FHx, SHx, unless otherwise noted    ALLERGIES & MEDICATIONS  --------------------------------------------------------------------------------  Allergies    penicillins (Unknown)    Intolerances      Standing Inpatient Medications  aMIOdarone    Tablet 200 milliGRAM(s) Oral daily  aMIOdarone    Tablet   Oral   aspirin  chewable 81 milliGRAM(s) Oral daily  atorvastatin 80 milliGRAM(s) Oral at bedtime  budesonide  80 MICROgram(s)/formoterol 4.5 MICROgram(s) Inhaler 2 Puff(s) Inhalation two times a day  carvedilol 25 milliGRAM(s) Oral every 12 hours  chlorhexidine 2% Cloths 1 Application(s) Topical daily  dextrose 50% Injectable 25 Gram(s) IV Push once  dextrose 50% Injectable 12.5 Gram(s) IV Push once  heparin  Infusion 1600 Unit(s)/Hr IV Continuous <Continuous>  hydrALAZINE 100 milliGRAM(s) Oral three times a day  insulin glargine Injectable (LANTUS) 12 Unit(s) SubCutaneous at bedtime  insulin lispro (ADMELOG) corrective regimen sliding scale   SubCutaneous at bedtime  insulin lispro (ADMELOG) corrective regimen sliding scale   SubCutaneous three times a day before meals  insulin lispro Injectable (ADMELOG) 8 Unit(s) SubCutaneous three times a day before meals  isosorbide   dinitrate Tablet (ISORDIL) 40 milliGRAM(s) Oral three times a day  spironolactone 25 milliGRAM(s) Oral daily  vancomycin  IVPB 1500 milliGRAM(s) IV Intermittent once    PRN Inpatient Medications      REVIEW OF SYSTEMS  --------------------------------------------------------------------------------  per above     VITALS/PHYSICAL EXAM  --------------------------------------------------------------------------------  T(C): 36.8 (11-20-23 @ 08:00), Max: 37.1 (11-20-23 @ 00:00)  HR: 71 (11-20-23 @ 08:00) (69 - 83)  BP: 104/65 (11-20-23 @ 08:00) (104/65 - 104/65)  RR: 18 (11-20-23 @ 08:00) (16 - 25)  SpO2: 95% (11-20-23 @ 08:00) (94% - 98%)  Wt(kg): --        11-19-23 @ 07:01  -  11-20-23 @ 07:00  --------------------------------------------------------  IN: 1324 mL / OUT: 1940 mL / NET: -616 mL    11-20-23 @ 07:01  -  11-20-23 @ 09:24  --------------------------------------------------------  IN: 13 mL / OUT: 0 mL / NET: 13 mL      Physical Exam:  	Gen: NAD  	HEENT: MMM  	Pulm: CTA B/L  	CV: S1S2 +IABP  	Abd: Soft, +BS   	Ext: No LE edema B/L  	Neuro: Awake  	Skin: Warm and dry  	Vascular access:      LABS/STUDIES  --------------------------------------------------------------------------------              11.2   11.22 >-----------<  274      [11-20-23 @ 00:09]              33.4     130  |  100  |  38  ----------------------------<  185      [11-20-23 @ 00:09]  4.5   |  16  |  1.79        Ca     9.8     [11-20-23 @ 00:09]      Mg     1.9     [11-20-23 @ 00:09]      Phos  3.5     [11-20-23 @ 00:09]    TPro  7.6  /  Alb  3.7  /  TBili  0.4  /  DBili  x   /  AST  25  /  ALT  46  /  AlkPhos  69  [11-20-23 @ 00:09]    PT/INR: PT 12.6 , INR 1.15       [11-20-23 @ 00:09]  PTT: 73.8       [11-20-23 @ 07:53]      Creatinine Trend:  SCr 1.79 [11-20 @ 00:09]  SCr 1.65 [11-19 @ 02:54]  SCr 1.73 [11-18 @ 13:59]  SCr 1.84 [11-18 @ 00:24]  SCr 1.62 [11-17 @ 01:45]    Urinalysis - [11-20-23 @ 00:09]      Color  / Appearance  / SG  / pH       Gluc 185 / Ketone   / Bili  / Urobili        Blood  / Protein  / Leuk Est  / Nitrite       RBC  / WBC  / Hyaline  / Gran  / Sq Epi  / Non Sq Epi  / Bacteria     Urine Sodium 88      [11-19-23 @ 04:24]  Urine Potassium 22      [11-19-23 @ 04:24]  Urine Phosphorus 39.3      [11-19-23 @ 04:24]  Urine Osmolality 431      [11-19-23 @ 04:24]        ANCA: cANCA Negative, pANCA Negative, atypical ANCA Negative      [11-19-23 @ 02:54]

## 2023-11-20 NOTE — PROGRESS NOTE ADULT - SUBJECTIVE AND OBJECTIVE BOX
INFECTIOUS DISEASES FOLLOW UP-- Courtney Peters  [phone]    This is a follow up note for this  58yMale with  Non-ST elevation myocardial infarction (NSTEMI)        ROS:  CONSTITUTIONAL:  No fever, good appetite  CARDIOVASCULAR:  No chest pain or palpitations  RESPIRATORY:  No dyspnea  GASTROINTESTINAL:  No nausea, vomiting, diarrhea, or abdominal pain  GENITOURINARY:  No dysuria  NEUROLOGIC:  No headache,     Allergies    penicillins (Unknown)    Intolerances        ANTIBIOTICS/RELEVANT:  antimicrobials    immunologic:    OTHER:  aMIOdarone    Tablet 200 milliGRAM(s) Oral daily  aMIOdarone    Tablet   Oral   aspirin  chewable 81 milliGRAM(s) Oral daily  atorvastatin 80 milliGRAM(s) Oral at bedtime  budesonide  80 MICROgram(s)/formoterol 4.5 MICROgram(s) Inhaler 2 Puff(s) Inhalation two times a day  carvedilol 25 milliGRAM(s) Oral every 12 hours  chlorhexidine 2% Cloths 1 Application(s) Topical daily  dextrose 50% Injectable 25 Gram(s) IV Push once  dextrose 50% Injectable 12.5 Gram(s) IV Push once  heparin  Infusion 1600 Unit(s)/Hr IV Continuous <Continuous>  hydrALAZINE 100 milliGRAM(s) Oral three times a day  insulin glargine Injectable (LANTUS) 12 Unit(s) SubCutaneous at bedtime  insulin lispro (ADMELOG) corrective regimen sliding scale   SubCutaneous at bedtime  insulin lispro (ADMELOG) corrective regimen sliding scale   SubCutaneous three times a day before meals  insulin lispro Injectable (ADMELOG) 8 Unit(s) SubCutaneous three times a day before meals  isosorbide   dinitrate Tablet (ISORDIL) 40 milliGRAM(s) Oral three times a day  polyethylene glycol 3350 17 Gram(s) Oral two times a day  senna 2 Tablet(s) Oral at bedtime  spironolactone 25 milliGRAM(s) Oral daily      Objective:  Vital Signs Last 24 Hrs  T(C): 36.9 (20 Nov 2023 12:00), Max: 37.1 (20 Nov 2023 00:00)  T(F): 98.4 (20 Nov 2023 12:00), Max: 98.8 (20 Nov 2023 00:00)  HR: 77 (20 Nov 2023 15:00) (69 - 81)  BP: 104/65 (20 Nov 2023 08:00) (104/65 - 104/65)  BP(mean): 77 (20 Nov 2023 08:00) (77 - 77)  RR: 26 (20 Nov 2023 15:00) (16 - 26)  SpO2: 98% (20 Nov 2023 15:00) (94% - 98%)    Parameters below as of 20 Nov 2023 12:00  Patient On (Oxygen Delivery Method): room air        PHYSICAL EXAM:  Constitutional:no acute distress  Eyes:MARVEL, EOMI  Ear/Nose/Throat: no oral lesions, RIJ Santiago is out	  Respiratory: clear BL  Cardiovascular: S1S2  Gastrointestinal:soft, (+) BS, no tenderness  Extremities:no e/e/c Left femoral IABP being removed  No Lymphadenopathy  IV sites not inflammed.    LABS:                        11.2   11.22 )-----------( 274      ( 20 Nov 2023 00:09 )             33.4     11-20    130<L>  |  100  |  38<H>  ----------------------------<  185<H>  4.5   |  16<L>  |  1.79<H>    Ca    9.8      20 Nov 2023 00:09  Phos  3.5     11-20  Mg     1.9     11-20    TPro  7.6  /  Alb  3.7  /  TBili  0.4  /  DBili  x   /  AST  25  /  ALT  46<H>  /  AlkPhos  69  11-20    PT/INR - ( 20 Nov 2023 00:09 )   PT: 12.6 sec;   INR: 1.15 ratio         PTT - ( 20 Nov 2023 07:53 )  PTT:73.8 sec  Urinalysis Basic - ( 20 Nov 2023 00:09 )    Color: x / Appearance: x / SG: x / pH: x  Gluc: 185 mg/dL / Ketone: x  / Bili: x / Urobili: x   Blood: x / Protein: x / Nitrite: x   Leuk Esterase: x / RBC: x / WBC x   Sq Epi: x / Non Sq Epi: x / Bacteria: x        MICROBIOLOGY:            RECENT CULTURES:  11-18 @ 13:40  .Blood Blood-Peripheral  --  --  --    No growth at 24 hours  --  11-18 @ 13:30  .Blood Blood-Peripheral  --  --  --    No growth at 24 hours  --  11-18 @ 12:13  Clean Catch Clean Catch (Midstream)  --  --  --    10,000 - 49,000 CFU/mL Enterococcus species  <10,000 CFU/ml Normal Urogenital kaitlynn present  --  11-17 @ 18:45  .Blood Blood  --  --  --    Growth in aerobic and anaerobic bottles: Enterococcus faecalis  See previous culture [id number]  --  11-17 @ 18:37  .Blood Blood  Blood Culture PCR  Blood Culture PCR  Enterococcus faecalis  Blood Culture PCR  PCR    Growth in aerobic bottle: Enterococcus faecalis  Growth in anaerobic bottle: Staphylococcus epidermidis "Susceptibilities  not performed"  Direct identification is available within approximately 3-5  hours either by Blood Panel Multiplexed PCR or Direct  MALDI-TOF. Details: [email or web address]  --      RADIOLOGY & ADDITIONAL STUDIES:    < from: Xray Chest 1 View- PORTABLE-Routine (Xray Chest 1 View- PORTABLE-Routine in AM.) (11.20.23 @ 07:37) >  Frontal expiratory view of the chest shows the heart to be similar in   size. IABP marker projects approximately 2 cm below the aortic knob.    The lungs are clear and there isno evidence of pneumothorax nor pleural   effusion.    Chest one view 11/20/2023 5:30 AM  Compared to the prior study, IABP marker reaches the lower aortic knob.    IMPRESSION:  Marker as noted.    < end of copied text >

## 2023-11-20 NOTE — PROGRESS NOTE ADULT - PROBLEM SELECTOR PLAN 1
ARIC in the setting of heart failure, COVID infection. At the time of presentation Scr is 1.25. Started to worsen on 11/4 and peaked at 4.07 11/10 and gradually improved to 1.8 on 11/18. Pt had LHC on 11/1. He has hx of DM with proteinuria of 684 but most recent UA negative. Primary team is planning to get cardiac transplant eval. Currently nonoliguric, UOP of 1.2L. BETZAIDA, HIV, Hep B, Hep C - negative. A1c - 8.3    PLAN:  No acute intervention from nephrology point of view is indicated. Scr stable at 1.79 today.   Pending serological workup - PLA2R Ab, C3, C4, Anti GBM antibody, Anti Ds DNA, ANCA, Serum free light chains, immunofixation,   Repeat UA, Upr/cr.  Please order kidney duplex   Avoid nephrotoxic agents. Dose meds per eGFR.     If you have any questions, please feel free to contact me  Robert Garcia  Nephrology Fellow  [phone]; Prefer [employer] TEAMS  (After 5pm or on weekends please page the on-call fellow).

## 2023-11-20 NOTE — PROGRESS NOTE ADULT - SUBJECTIVE AND OBJECTIVE BOX
DEVORAH VALENCIA  MRN-[id number]  Patient is a 58y old  Male who presents with a chief complaint of advanced cardiac support work up (2023 16:05)    HPI:  pt seen and approx 1:30 pm  in [facility]    57yo M w/ hx HTN, CAD w/ 1 stent in , ICH () presenting with abn ekg. Patient presented to [facility] where he was found to have STEMI, recommended to get cath however patient did not want to get it there so it left and came here.  Patient initially had cough, congestion, fever, was placed on antibiotics on .  Started feeling nauseous and had a presyncopal event after which he presented to ED last night.  Had chest pain as well.  Chest pain is midsternal.  Not currently having chest pain.  Received 4 aspirin 30 min pta. (2023 15:11)      Hospital Course:    24 HOUR EVENTS:    REVIEW OF SYSTEMS:    CONSTITUTIONAL: No weakness, fevers or chills  EYES/ENT: No visual changes;  No vertigo or throat pain   NECK: No pain or stiffness  RESPIRATORY: No cough, wheezing, hemoptysis; No shortness of breath  CARDIOVASCULAR: No chest pain or palpitations  GASTROINTESTINAL: No abdominal or epigastric pain. No nausea, vomiting, or hematemesis; No diarrhea or constipation. No melena or hematochezia.  GENITOURINARY: No dysuria, frequency or hematuria  NEUROLOGICAL: No numbness or weakness  SKIN: No itching, rashes      ICU Vital Signs Last 24 Hrs  T(C): 37.1 (2023 16:00), Max: 37.1 (2023 00:00)  T(F): 98.7 (2023 16:00), Max: 98.8 (2023 00:00)  HR: 72 (2023 20:00) (69 - 80)  BP: 104/65 (2023 08:00) (104/65 - 104/65)  BP(mean): 77 (2023 08:00) (77 - 77)  ABP: --  ABP(mean): --  RR: 22 (2023 20:00) (16 - 29)  SpO2: 97% (2023 20:00) (94% - 98%)    O2 Parameters below as of 2023 12:00  Patient On (Oxygen Delivery Method): room air            CVP(mm Hg): --  CO: --  CI: --  PA: --  PA(mean): --  PA(direct): --  PCWP: --  LA: --  RA: --  SVR: --  SVRI: --  PVR: --  PVRI: --  I&O's Summary    2023 07:  -  2023 07:00  --------------------------------------------------------  IN: 1324 mL / OUT: 1940 mL / NET: -616 mL    2023 07:01  -  2023 21:04  --------------------------------------------------------  IN: 1058 mL / OUT: 500 mL / NET: 558 mL        CAPILLARY BLOOD GLUCOSE    CAPILLARY BLOOD GLUCOSE      POCT Blood Glucose.: 166 mg/dL (2023 20:02)      PHYSICAL EXAM:  GENERAL: No acute distress, well-developed  HEAD:  Atraumatic, Normocephalic  EYES: EOMI, PERRLA, conjunctiva and sclera clear  NECK: Supple, no lymphadenopathy, no JVD  CHEST/LUNG: CTAB; No wheezes, rales, or rhonchi  HEART: Regular rate and rhythm. Normal S1/S2. No murmurs, rubs, or gallops  ABDOMEN: Soft, non-tender, non-distended; normal bowel sounds, no organomegaly  EXTREMITIES:  2+ peripheral pulses b/l, No clubbing, cyanosis, or edema  NEUROLOGY: A&O x 3, no focal deficits  SKIN: No rashes or lesions    ============================I/O===========================   I&O's Detail    2023 07:01  -  2023 07:00  --------------------------------------------------------  IN:    Heparin: 294 mL    IV PiggyBack: 550 mL    Oral Fluid: 480 mL  Total IN: 1324 mL    OUT:    Voided (mL): 1940 mL  Total OUT: 1940 mL    Total NET: -616 mL      2023 07:01  -  2023 21:04  --------------------------------------------------------  IN:    Heparin: 78 mL    IV PiggyBack: 500 mL    Oral Fluid: 480 mL  Total IN: 1058 mL    OUT:    Voided (mL): 500 mL  Total OUT: 500 mL    Total NET: 558 mL        ============================ LABS =========================                        11.2   11. )-----------( 274      ( 2023 00:09 )             33.4     11-20    130<L>  |  100  |  38<H>  ----------------------------<  185<H>  4.5   |  16<L>  |  1.79<H>    Ca    9.8      2023 00:09  Phos  3.5     11-20  Mg     1.9     -    TPro  7.6  /  Alb  3.7  /  TBili  0.4  /  DBili  x   /  AST  25  /  ALT  46<H>  /  AlkPhos  69  11-20                LIVER FUNCTIONS - ( 2023 00:09 )  Alb: 3.7 g/dL / Pro: 7.6 g/dL / ALK PHOS: 69 U/L / ALT: 46 U/L / AST: 25 U/L / GGT: x           PT/INR - ( 2023 00:09 )   PT: 12.6 sec;   INR: 1.15 ratio         PTT - ( 2023 07:53 )  PTT:73.8 sec    Blood Gas Arterial, Lactate: 0.8 mmol/L (23 @ 00:13)    Urinalysis Basic - ( 2023 00:09 )    Color: x / Appearance: x / SG: x / pH: x  Gluc: 185 mg/dL / Ketone: x  / Bili: x / Urobili: x   Blood: x / Protein: x / Nitrite: x   Leuk Esterase: x / RBC: x / WBC x   Sq Epi: x / Non Sq Epi: x / Bacteria: x      ======================Micro/Rad/Cardio=================  Telemtry: Reviewed   EKG: Reviewed  CXR: Reviewed  Culture: Reviewed   Echo:   Cath: Cardiac Cath Lab - Adult:   [facility]  Department of Cardiology  [address] [place] [address]  [phone]  Cath Lab Report -- Comprehensive Report  Patient: LD VALENCIA  Study date: 2017  Account number: [id number]  MR number: [id number]  : 1965  Gender: Male  Race: W  Case Physician(s):  Jairon Holguin M.D.  Referring Physician:  Luc Lynn M.D.  INDICATIONS: Unstable angina - CCS4.  HISTORY: The patient has a history of coronary artery disease. The patient  hashypertension and medication-treated dyslipidemia.  PROCEDURE:  --  Left heart catheterization with ventriculography.  --  Left coronary angiography.  --  Right coronary angiography.  TECHNIQUE: The risks and alternatives of the procedures and conscious  sedation were explained to the patient and informed consent was obtained.  Cardiac catheterization performed electively.  Local anesthetic given. Right radial artery access. A 6FR PRELUDE KIT was  inserted in the vessel. Left heart catheterization. Ventriculography was  performed. A 5FR FR4.0 EXPO catheter was utilized. Left coronary artery  angiography. The vessel was injected utilizing a 5FR FL3.5 EXPO catheter.  Right coronary artery angiography. The vessel was injected utilizing a 5FR  FR4.0 EXPO catheter. RADIATION EXPOSURE: 1.1 min.  CONTRAST GIVEN: Omnipaque 55 ml.  MEDICATIONS GIVEN: Midazolam, 1 mg, IV. Fentanyl, 25 mcg, IV. Verapamil  (Isoptin, Calan, Covera), 2.5 mg, IA. Heparin, 3000 units, IA.  VENTRICLES: Global left ventricular function was moderately depressed. EF  estimated was 40 %.  CORONARY VESSELS: The coronary circulation is right dominant.  LM:   --  LM: Normal.  LAD:   --  Proximal LAD: There was a 50 % stenosis.  CX:   --  Circumflex: Normal.  RCA:   --  Mid RCA: There was a 40 % stenosis.  --  Distal RCA: There was a 50 % stenosis.  COMPLICATIONS: There were no complications.  DIAGNOSTIC RECOMMENDATIONS: The patient should continue with the present  medications.  Prepared and signed by  Jairon Holguin M.D.  Signed 2017 12:20:13  HEMODYNAMIC TABLES  Pressures:  Baseline/ Room Air  Pressures:  - HR: 78  Pressures:  - Rhythm:  Pressures:  -- Aortic Pressure (S/D/M): --/--/99  Pressures:  -- Left Ventricle (s/edp): 157/39/--  Outputs:  Baseline/ Room Air  Outputs:  -- CALCULATIONS: Age in years: 51.41  Outputs:  -- CALCULATIONS: Body Surface Area: 2.05  Outputs:  -- CALCULATIONS: Height in cm: 175.00  Outputs:  -- CALCULATIONS: Sex: Male  Outputs:  -- CALCULATIONS: Weight in k.40 (17 @ 21:55)    ======================================================  PAST MEDICAL & SURGICAL HISTORY:  HTN (hypertension)      CAD (coronary artery disease)  ; stent      Intracranial hemorrhage        Respiratory arrest        Myocardial infarction, unspecified MI type, unspecified artery      History of coronary artery stent placement  ====================ASSESSMENT ==============  58 male with HTN, CAD (s/p PCI ), HFrEF, CVA , and T2DM presenting with chest pressure and unknown tachycardia that was shocked x1, [facility]  found to have in-stent restenosis of pLAD and  of RCA with elevated RA and PA pressures and severely decreased EF 32%, admitted to CICU for management of cardiogenic shock requiring IABP  -, with hospital course c/b vfib arrest requiring reinsertion of IABP. Currently undergoing workup for AT evaluation with HF.    Plan:  ====================== NEUROLOGY=====================  # Anxiety  - Ativan 0.5 mg PO Q 6hrs PRN   - No Seroquel/antipsychotics since vfib arrest and prolonged QTC  - Psych Eval last week, recs for continuing Ativan PRN and had recommended SSRI, but pt. refused   - Needs psych eval for tx     # Pain 2/2 rib fractures post CPR  - c/w multimodal pain control w/ tylenol and lidoderm TD    ==================== RESPIRATORY======================  # Acute Hypoxemic Respiratory Failure  - s/p x2 intubations for cardiogenic pulm edema and the in setting of cardiac arrest, resolved - extubated 11/10  - Currently on room air with spO2 mid 90s  - Continue incentive spirometry and monitoring of sp02    # Asthma  - c/w albuterol, symbicort and spiriva  - on trelegy at home    ====================CARDIOVASCULAR===================  # Vfib arrest i/s/o ischemia  - Lido gtt off since   - PO Amio load dose (s/p IV amio ~1950mg IV gtt) for total of 5 grams per EP to complete   - Keep K > 4, Mag > 2.2     # Cardiogenic shock requiring IABP (- , -)  - Likely 2/2 NSTEMI and ADHF  -  LHC: pLAD 100 % in-stent restenosis & mRCA, 100 %. PCWP 30. IABP placed.  -  TTE: LV dilated. EF 32 %. Regional WMAs present, mod (grade 2) LV diastolic dysfunction  -  TTE: EF 22% and + LV thrombus   - IABP removed , vfib , IABP later reinserted that day for CS. continue IABP 1:1  - Off Milrinone gtt @ 7:30 am   - Currently on hydralazine 100 TID and ISD 40 TID for AL reduction  - Holding diuretics at this time, PRN Bumex IVP as needed  - Continue romel 25 daily and coreg 25 BID for GDMT  - AT Eval, F/u HF recs    # NSTEMI iso stent re-occlusion of pLAD and 100%  of RCA  - EKG on admission w/LBBB  - DAPT: c/w ASA, Brilinta d/c'd per transplant w/u  - c/w lipitor 80  - cMR deferred given necessity of IABP  - CT sx not recommending CABG, undergoing AT eval    # LV thrombus  - c/w heparin gtt    ===================HEMATOLOGIC/ONC ===================  # VTE prophylaxis   - c/w heparin gtt given LV Thrombus and IABP   - H/H and platelets stable  - Continue to monitor daily    right IJ ?hematoma  - US ordered, r/o clot vs. hematoma    ===================== RENAL =========================  # Non-oliguric ARIC   - sCr stable, Baseline Cr: 1-1.22  - Diuresis held, optimize pre-renal factors for optimal perfusion and volume status  - Trend BMP, lytes daily, replace as needed  - Continue Strict I/Os, avoid nephrotoxins    ==================== GASTROINTESTINAL===================  DASH diet  - Continue to monitor for BM    GERD  - c/w home protonix    =======================    ENDOCRINE  =====================  # Type 2 DM  - A1c 8.3  - Insulin gtt transitioned off  AM  - Continue lantus, premeal, low ISS    ========================INFECTIOUS DISEASE================  # Enterococcus faecalis bacteremia  - Concern for aspiration event prior to intubation on admission - s/p vanco and cefepime course  - BCx 11/10 NGTD, RVP neg, MRSA neg, UA neg  - BCx + for enterococcus, started on Vanco (by level)   - f/u BCx and urine cx   - Plan for IABP swap  as per ID   - CT A/P negative for infectious pathology    # COVID, resolved  - Off airborne precautions     # Pre-transplant ID w/u   - Trend ID recs for serologies   - Colonoscopy  - normal   - Chest CT  - improved LLL aeration  - f/u abdominal US    LINES:   RIJ [place] -, RAx Jacklyn -, LFA IABP  -    Ethics/Dispo: full code, maintain in CICU    Patient requires continuous monitoring with bedside rhythm monitoring, pulse ox monitoring, and intermittent blood gas analysis. Care plan discussed with ICU care team. Patient remained critical and at risk for life threatening decompensation.  Patient seen, examined and plan discussed with CCU team during rounds.     I have personally provided ____ minutes of critical care time excluding time spent on separate procedures, in addition to initial critical care time provided by the CICU Attending, Dr. Her.    By signing my name below, I, Kalyn Sousa, attest that this documentation has been prepared under the direction and in the presence of KARYN Washburn.   Electronically signed: Rosalba Booth, 23 @ 21:04    I, Laura Mcclendon, personally performed the services described in this documentation. all medical record entries made by the eileenibe were at my direction and in my presence. I have reviewed the chart and agree that the record reflects my personal performance and is accurate and complete  Electronically signed: KARYN Washburn.       DEVORAH VALENCIA  MRN-[id number]  Patient is a 58y old  Male who presents with a chief complaint of advanced cardiac support work up (2023 16:05)    HPI:  pt seen and approx 1:30 pm  in [facility]    59yo M w/ hx HTN, CAD w/ 1 stent in , ICH () presenting with abn ekg. Patient presented to [facility] where he was found to have STEMI, recommended to get cath however patient did not want to get it there so it left and came here.  Patient initially had cough, congestion, fever, was placed on antibiotics on .  Started feeling nauseous and had a presyncopal event after which he presented to ED last night.  Had chest pain as well.  Chest pain is midsternal.  Not currently having chest pain.  Received 4 aspirin 30 min pta. (2023 15:11)    24 HOUR EVENTS:  - IABP swapped today, heparin gtt restarted 9PM    ICU Vital Signs Last 24 Hrs  T(C): 37.1 (2023 16:00), Max: 37.1 (2023 00:00)  T(F): 98.7 (2023 16:00), Max: 98.8 (2023 00:00)  HR: 72 (2023 20:00) (69 - 80)  BP: 104/65 (2023 08:00) (104/65 - 104/65)  BP(mean): 77 (2023 08:00) (77 - 77)  RR: 22 (2023 20:00) (16 - 29)  SpO2: 97% (2023 20:00) (94% - 98%)    O2 Parameters below as of 2023 12:00  Patient On (Oxygen Delivery Method): room air    I&O's Summary    2023 07:01  -  2023 07:00  --------------------------------------------------------  IN: 1324 mL / OUT: 1940 mL / NET: -616 mL    2023 07:01  -  2023 21:04  --------------------------------------------------------  IN: 1058 mL / OUT: 500 mL / NET: 558 mL      CAPILLARY BLOOD GLUCOSE  POCT Blood Glucose.: 166 mg/dL (2023 20:02)      PHYSICAL EXAM:  GENERAL: NAD  CHEST/LUNG: CTAB; No wheezes, rales, or rhonchi  HEART: Regular rate and rhythm. Normal S1/S2  ABDOMEN: Soft, non-tender, non-distended  EXTREMITIES: no peripheral edema b/l. LF IABP site soft  NEUROLOGY: A&O x 3    ============================I/O===========================   I&O's Detail    2023 07:01  -  2023 07:00  --------------------------------------------------------  IN:    Heparin: 294 mL    IV PiggyBack: 550 mL    Oral Fluid: 480 mL  Total IN: 1324 mL    OUT:    Voided (mL): 1940 mL  Total OUT: 1940 mL    Total NET: -616 mL      2023 07:01  -  2023 21:04  --------------------------------------------------------  IN:    Heparin: 78 mL    IV PiggyBack: 500 mL    Oral Fluid: 480 mL  Total IN: 1058 mL    OUT:    Voided (mL): 500 mL  Total OUT: 500 mL    Total NET: 558 mL        ============================ LABS =========================                        11.2   11.22 )-----------( 274      ( 2023 00:09 )             33.4     11-20    130<L>  |  100  |  38<H>  ----------------------------<  185<H>  4.5   |  16<L>  |  1.79<H>    Ca    9.8      2023 00:09  Phos  3.5     11-  Mg     1.9         TPro  7.6  /  Alb  3.7  /  TBili  0.4  /  DBili  x   /  AST  25  /  ALT  46<H>  /  AlkPhos  69      LIVER FUNCTIONS - ( 2023 00:09 )  Alb: 3.7 g/dL / Pro: 7.6 g/dL / ALK PHOS: 69 U/L / ALT: 46 U/L / AST: 25 U/L / GGT: x           PT/INR - ( 2023 00:09 )   PT: 12.6 sec;   INR: 1.15 ratio         PTT - ( 2023 07:53 )  PTT:73.8 sec    Blood Gas Arterial, Lactate: 0.8 mmol/L (23 @ 00:13)    Urinalysis Basic - ( 2023 00:09 )    Color: x / Appearance: x / SG: x / pH: x  Gluc: 185 mg/dL / Ketone: x  / Bili: x / Urobili: x   Blood: x / Protein: x / Nitrite: x   Leuk Esterase: x / RBC: x / WBC x   Sq Epi: x / Non Sq Epi: x / Bacteria: x      ======================Micro/Rad/Cardio=================  Telemtry: Reviewed   EKG: Reviewed  CXR: Reviewed  Culture: Reviewed   Echo:   Cath: Cardiac Cath Lab - Adult:   [facility]  Department of Cardiology  [address] [place] [id number] [phone]  Cath Lab Report -- Comprehensive Report  Patient: LD VALENCIA  Study date: 2017  Account number: [id number]  MR number: [id number]  : 1965  Gender: Male  Race: W  Case Physician(s):  Jairon Holguin M.D.  Referring Physician:  Luc Lynn M.D.  INDICATIONS: Unstable angina - CCS4.  HISTORY: The patient has a history of coronary artery disease. The patient  hashypertension and medication-treated dyslipidemia.  PROCEDURE:  --  Left heart catheterization with ventriculography.  --  Left coronary angiography.  --  Right coronary angiography.  TECHNIQUE: The risks and alternatives of the procedures and conscious  sedation were explained to the patient and informed consent was obtained.  Cardiac catheterization performed electively.  Local anesthetic given. Right radial artery access. A 6FR PRELUDE KIT was  inserted in the vessel. Left heart catheterization. Ventriculography was  performed. A 5FR FR4.0 EXPO catheter was utilized. Left coronary artery  angiography. The vessel was injected utilizing a 5FR FL3.5 EXPO catheter.  Right coronary artery angiography. The vessel was injected utilizing a 5FR  FR4.0 EXPO catheter. RADIATION EXPOSURE: 1.1 min.  CONTRAST GIVEN: Omnipaque 55 ml.  MEDICATIONS GIVEN: Midazolam, 1 mg, IV. Fentanyl, 25 mcg, IV. Verapamil  (Isoptin, Calan, Covera), 2.5 mg, IA. Heparin, 3000 units, IA.  VENTRICLES: Global left ventricular function was moderately depressed. EF  estimated was 40 %.  CORONARY VESSELS: The coronary circulation is right dominant.  LM:   --  LM: Normal.  LAD:   --  Proximal LAD: There was a 50 % stenosis.  CX:   --  Circumflex: Normal.  RCA:   --  Mid RCA: There was a 40 % stenosis.  --  Distal RCA: There was a 50 % stenosis.  COMPLICATIONS: There were no complications.  DIAGNOSTIC RECOMMENDATIONS: The patient should continue with the present  medications.  Prepared and signed by  Jairon Holguin M.D.  Signed 2017 12:20:13  HEMODYNAMIC TABLES  Pressures:  Baseline/ Room Air  Pressures:  - HR: 78  Pressures:  - Rhythm:  Pressures:  -- Aortic Pressure (S/D/M): --/--/99  Pressures:  -- Left Ventricle (s/edp): 157/39/--  Outputs:  Baseline/ Room Air  Outputs:  -- CALCULATIONS[age]  Outputs:  -- CALCULATIONS: Body Surface Area: 2.05  Outputs:  -- CALCULATIONS: Height in cm: 175.00  Outputs:  -- CALCULATIONS: Sex: Male  Outputs:  -- CALCULATIONS: Weight in k.40 (17 @ 21:55)    ======================================================  PAST MEDICAL & SURGICAL HISTORY:  HTN (hypertension)      CAD (coronary artery disease)  ; stent      Intracranial hemorrhage        Respiratory arrest        Myocardial infarction, unspecified MI type, unspecified artery      History of coronary artery stent placement    ====================ASSESSMENT ==============  58 male with HTN, CAD (s/p PCI ), HFrEF, CVA , and T2DM presenting with chest pressure and unknown tachycardia that was shocked x1, [facility]  found to have in-stent restenosis of pLAD and  of RCA with elevated RA and PA pressures and severely decreased EF 32%, admitted to CICU for management of cardiogenic shock requiring IABP  -, with hospital course c/b vfib arrest requiring reinsertion of IABP. Currently undergoing workup for AT evaluation with HF.    Plan:  ====================== NEUROLOGY=====================  # Anxiety  - Ativan 0.5 mg PO Q 6hrs PRN   - No Seroquel/antipsychotics since vfib arrest and prolonged QTC  - Psych Eval last week, recs for continuing Ativan PRN and had recommended SSRI, but pt. refused   - Needs psych eval for tx     # Pain 2/2 rib fractures post CPR  - c/w multimodal pain control w/ tylenol and lidoderm TD    ==================== RESPIRATORY======================  # Acute Hypoxemic Respiratory Failure  - s/p x2 intubations for cardiogenic pulm edema and the in setting of cardiac arrest, resolved - extubated 11/10  - Currently on room air with spO2 mid 90s  - Continue incentive spirometry and monitoring of sp02    # Asthma  - c/w albuterol, symbicort and spiriva  - on trelegy at home    ====================CARDIOVASCULAR===================  # Vfib arrest i/s/o ischemia  - Lido gtt off since   - PO Amio load dose (s/p IV amio ~1950mg IV gtt) for total of 5 grams per EP to complete   - Keep K > 4, Mag > 2.2     # Cardiogenic shock requiring IABP (- , -)  - Likely 2/2 NSTEMI and ADHF  -  LHC: pLAD 100 % in-stent restenosis & mRCA, 100 %. PCWP 30. IABP placed.  -  TTE: LV dilated. EF 32 %. Regional WMAs present, mod (grade 2) LV diastolic dysfunction  -  TTE: EF 22% and + LV thrombus   - IABP swapped today  to LFA  - Off Milrinone gtt @ 7:30 am   - Currently on hydralazine 100 TID and ISD 40 TID for AL reduction  - Holding diuretics at this time, PRN Bumex IVP as needed  - Continue romel 25 daily and coreg 25 BID for GDMT  - AT Eval, F/u HF recs    # NSTEMI iso stent re-occlusion of pLAD and 100%  of RCA  - EKG on admission w/LBBB  - DAPT: c/w ASA, Brilinta d/c'd per transplant w/u  - c/w lipitor 80  - cMR deferred given necessity of IABP  - CT sx not recommending CABG, undergoing AT eval    # LV thrombus  - c/w heparin gtt    ===================HEMATOLOGIC/ONC ===================  # VTE prophylaxis   - c/w heparin gtt given LV Thrombus and IABP   - H/H and platelets stable  - Continue to monitor daily    ===================== RENAL =========================  # Non-oliguric ARIC   - sCr stable, Baseline Cr: 1-1.22  - Diuresis held, optimize pre-renal factors for optimal perfusion and volume status  - Trend BMP, lytes daily, replace as needed  - Continue Strict I/Os, avoid nephrotoxins    ==================== GASTROINTESTINAL===================  DASH diet  - Continue to monitor for BM    GERD  - c/w home protonix    =======================    ENDOCRINE  =====================  # Type 2 DM  - A1c 8.3  - Insulin gtt transitioned off  AM  - Continue lantus, premeal, low ISS    ========================INFECTIOUS DISEASE================  # Enterococcus faecalis bacteremia  - Concern for aspiration event prior to intubation on admission - s/p vanco and cefepime course  - BCx 11/10 NGTD, RVP neg, MRSA neg, UA neg  - BCx + for enterococcus, started on Vanco (by level)   - f/u BCx and urine cx   - IABP site swapped to [facility]   - CT A/P negative for infectious pathology    # COVID, resolved  - Off airborne precautions     # Pre-transplant ID w/u   - Trend ID recs for serologies   - Colonoscopy  - normal   - Chest CT  - improved LLL aeration  - f/u abdominal US    LINES:   RIJ [place] -, RAx Jack[place] -, LFA IABP  -, RFA IABP -    Ethics/Dispo: full code, maintain in CICU    Patient requires continuous monitoring with bedside rhythm monitoring, pulse ox monitoring, and intermittent blood gas analysis. Care plan discussed with ICU care team. Patient remained critical and at risk for life threatening decompensation.  Patient seen, examined and plan discussed with CCU team during rounds.     I have personally provided 30 minutes of critical care time excluding time spent on separate procedures, in addition to initial critical care time provided by the CICU Attending, Dr. Her.    By signing my name below, I, Kalyn Sousa, attest that this documentation has been prepared under the direction and in the presence of KARYN Washburn.   Electronically signed: Sangita Booth, 23 @ 21:04    I, Laura Mcclendon, personally performed the services described in this documentation. all medical record entries made by the sangita were at my direction and in my presence. I have reviewed the chart and agree that the record reflects my personal performance and is accurate and complete  Electronically signed: KARYN Washburn.

## 2023-11-20 NOTE — PROGRESS NOTE ADULT - SUBJECTIVE AND OBJECTIVE BOX
LD VALENCIA  58y Male  MRN:[id number]    Patient is a 58y old  Male who presents with a chief complaint of NSTEMI (01 Nov 2023 20:29)    HPI:  59yo M w/ hx HTN, CAD w/ 1 stent in 2009, ICH (2008) presenting with abn ekg. Patient presented to [facility] where he was found to have STEMI, recommended to get cath however patient did not want to get it there so it left and came here.  Patient initially had cough, congestion, fever, was placed on antibiotics on Sunday.  Started feeling nauseous and had a presyncopal event after which he presented to ED last night.  Had chest pain as well.  Chest pain is midsternal.  Not currently having chest pain.  Received 4 aspirin 30 min pta. (01 Nov 2023 15:11)      Patient seen and evaluated at bedside in CCU. interval events noted    Interval HPI: remain in ccu. IABP in place   +blood cult    PAST MEDICAL & SURGICAL HISTORY:  HTN (hypertension)      CAD (coronary artery disease)  2009; stent      Intracranial hemorrhage  2008      Respiratory arrest  december 1st      Myocardial infarction, unspecified MI type, unspecified artery      History of coronary artery stent placement          REVIEW OF SYSTEMS:  as per hpi     VITALS:   ICU Vital Signs Last 24 Hrs  T(C): 36.8 (20 Nov 2023 08:00), Max: 37.1 (20 Nov 2023 00:00)  T(F): 98.2 (20 Nov 2023 08:00), Max: 98.8 (20 Nov 2023 00:00)  HR: 70 (20 Nov 2023 10:06) (69 - 83)  BP: 104/65 (20 Nov 2023 08:00) (104/65 - 104/65)  BP(mean): 77 (20 Nov 2023 08:00) (77 - 77)  ABP: --  ABP(mean): --  RR: 18 (20 Nov 2023 10:00) (16 - 25)  SpO2: 98% (20 Nov 2023 10:06) (94% - 98%)    O2 Parameters below as of 20 Nov 2023 10:06  Patient On (Oxygen Delivery Method): room air            PHYSICAL EXAM:  GENERAL: NAD, comfortable   HEAD:  Atraumatic, Normocephalic  EYES: EOMI, PERRLA, conjunctiva and sclera clear  NECK: Supple, No JVD   CHEST/LUNG: Clear to auscultation bilaterally; No wheeze  HEART: Regular rate and rhythm; No murmurs, rubs, or gallops  ABDOMEN: Soft, Nontender, Nondistended; Bowel sounds present  EXTREMITIES:  2+ Peripheral Pulses, No clubbing, cyanosis, or edema  NEUROLOGY: nonfocal  SKIN: No rashes or lesions  +iabp    Consultant(s) Notes Reviewed:  [x ] YES  [ ] NO  Care Discussed with Consultants/Other Providers [ x] YES  [ ] NO    MEDS:   MEDICATIONS  (STANDING):  aMIOdarone    Tablet 200 milliGRAM(s) Oral daily  aMIOdarone    Tablet   Oral   aspirin  chewable 81 milliGRAM(s) Oral daily  atorvastatin 80 milliGRAM(s) Oral at bedtime  budesonide  80 MICROgram(s)/formoterol 4.5 MICROgram(s) Inhaler 2 Puff(s) Inhalation two times a day  carvedilol 25 milliGRAM(s) Oral every 12 hours  chlorhexidine 2% Cloths 1 Application(s) Topical daily  dextrose 50% Injectable 25 Gram(s) IV Push once  dextrose 50% Injectable 12.5 Gram(s) IV Push once  heparin  Infusion 1600 Unit(s)/Hr (13 mL/Hr) IV Continuous <Continuous>  hydrALAZINE 100 milliGRAM(s) Oral three times a day  insulin glargine Injectable (LANTUS) 12 Unit(s) SubCutaneous at bedtime  insulin lispro (ADMELOG) corrective regimen sliding scale   SubCutaneous three times a day before meals  insulin lispro (ADMELOG) corrective regimen sliding scale   SubCutaneous at bedtime  insulin lispro Injectable (ADMELOG) 8 Unit(s) SubCutaneous three times a day before meals  isosorbide   dinitrate Tablet (ISORDIL) 40 milliGRAM(s) Oral three times a day  spironolactone 25 milliGRAM(s) Oral daily  vancomycin  IVPB 1500 milliGRAM(s) IV Intermittent once    MEDICATIONS  (PRN):        ALLERGIES:  penicillins (Unknown)      LABS:                                                                                 11.2   11.22 )-----------( 274      ( 20 Nov 2023 00:09 )             33.4   11-20    130<L>  |  100  |  38<H>  ----------------------------<  185<H>  4.5   |  16<L>  |  1.79<H>    Ca    9.8      20 Nov 2023 00:09  Phos  3.5     11-20  Mg     1.9     11-20    TPro  7.6  /  Alb  3.7  /  TBili  0.4  /  DBili  x   /  AST  25  /  ALT  46<H>  /  AlkPhos  69  11-20        < from: Colonoscopy (11.17.23 @ 10:35) >                                                                                                        Impression:          - The entire examined colon is normal on direct and retroflexion views.                       - No specimens collected.  Recommendation:      - Resume previous diet today.                       - No large polyps or masses detected, No objection from GI standpoint to                 proceed with heart transplantation/advanced heart therapies.                       - Please call back should any further questions or concerns arise.    < end of copied text >     < from: Xray Chest 1 View- PORTABLE-Urgent (11.01.23 @ 07:42) >    IMPRESSION:  Clear lungs.    ---End of Report ---        < end of copied text >  < from: TTE W or WO Ultrasound Enhancing Agent (11.01.23 @ 10:23) >  _____________________________     CONCLUSIONS:      1. Left ventricular cavity is moderately dilated. Left ventricular wall thickness is normal. Left ventricular systolic function is severely decreased with an ejection fraction of 32 % by Chinchilla's method of disks. Regional wall motion abnormalities present.   2. Multiple segmental abnormalities exist. See findings.   3. There is moderate (grade 2) left ventricular diastolic dysfunction, with indeterminant filling pressure.   4. Normal right ventricular cavity size, wall thickness, and systolic function.   5. No significant valvular disease.   6. No pericardial effusion seen.   7. Compared to the transthoracic echocardiogram performed on 1/25/2017 the areas of akinesis are unchanged but there has been a decline in LV systolic function with new areas of hypokinesis.    __________________________________________________________________    < end of copied text >  < from: TTE Limited W or WO Ultrasound Enhancing Agent (11.02.23 @ 07:41) >  __________________________     CONCLUSIONS:      1. After obtaining consent, Definity ultrasound enhancing agent was given for enhanced left ventricular opacification and improved delineation of the left ventricular endocardial borders. Left ventricular systolic function is severely decreased with a calculated ejection fraction of 22 % by the Chinchilla's biplane method of disks. There is a left ventricular thrombus.   2. Findings were discussed with Litzy BOSS on 11/2/2023 at 8.49am.   3. There is a left ventricular thrombus.    _________________________________________________________________    < end of copied text >

## 2023-11-20 NOTE — PROGRESS NOTE ADULT - PROBLEM SELECTOR PLAN 3
- pLAD 70 % stenosis in the ostium portion of the segment and 100 % in-stent restenosis. mRCA, 100 % stenosis.   - Currently ASA, Atorvastatin 80mg   - Brilinta d/c'ed 11/13  - IABP precludes cMRI to assess for viability but per CTSX, not a candidate for CABG.

## 2023-11-20 NOTE — BH CONSULTATION LIAISON PROGRESS NOTE - NSBHATTESTCOMMENTATTENDFT_PSY_A_CORE
58 year-old man with no formal prior psychiatric h/o and past medical history of HTN, CAD (1 stent in 2009), ICH (2008) presented on 11/1 with abnormal EKG. Patient admitted to [facility] for NSTEMI, s/p intubation and catheterization. He is admitted to CCU.   Psychiatry was consulted for "preoperative clearance for heart transplant".   ---  Patient seen at bedside, pleasant and cooperative. Able to rationally manipulate information provided to him and is able to demonstrate understanding and appreciation of aftercare plan, risks and alternatives to advanced care therapy. 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 Amenable to use of Atarax in lieu of ativan use. No absolute psychiatric contraindication to advanced care therapy.

## 2023-11-20 NOTE — CHART NOTE - NSCHARTNOTEFT_GEN_A_CORE
Removal of Femoral Sheath    Pulses in the left lower extremity are palpable. The patient was placed in the supine position. The insertion site was identified and the sutures were removed per protocol.  The 8 Romansh femoral sheath was then removed. Direct pressure was applied for 27 minutes.     Monitoring of the left groin and both lower extremities including neuro-vascular checks and vital signs every 15 minutes x 4, then every 30 minutes x 2, then every 1 hour was ordered.    Complications: None

## 2023-11-20 NOTE — PROGRESS NOTE ADULT - ASSESSMENT
57 yo M with HFrEF (LVIDd 6.4 cm, LVEF 32%), CAD s/p PCI (2008), HTN, DMT2 (A1c 8.3%) and CVA s/p TPA (2018), recently treated for PNA who initially presented to [facility] via EMS after syncope reportedly requiring defibrilation. Treated for ACS but left AMA to come to [facility]. On arrival ECG with ST depression in lateral leads. Intubated 11/1 for respiratory failure. Found to have COVID. L/RHC 11/1revealing  of LAD and RCA, elevated filling pressures and CI of 1.5 prompting placement of IABP. Extubated 11/3. IABP was weaned to off 11/7, then the following day on 11/8, developed PMVT cardiac arrest with prompt CPR and defibrillation, started on IV Lidocaine and Amio. IABP ultimately replaced on 11/9 for worsening hypotension. TTE 11/11 revealing LV thrombus.     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 AT evaluation launched 11/10, he is ABO A. Currently well supported on IABP at 1:1 without further arrhythmias.  He was discussed during TSC on 11/16 and was approved for listing PENDING hepatology clearance, colonoscopy and repeat chest CT to assess PNA. He developed leukocytosis and worsening kidney function and was found to be bacteremic with GPC in pairs and chains on 11/17. Repeat cultures from 11/18 reveal NGTD. He's currently afebrile with downtrending leukocytosis on vanc. He is hemodynamically stable with elevated MAPs in 80-90s on IABP 1:1 on high dose oral vaosdilators/GDMT. His Cr is downtrending. He appears euvolemic on exam with net negative fluid balance over the past 24 hours off diuretics.     Cardiac Studies  11/1123 TTE: LVEF 22% (global), LV thrombus, normal RV size and function, mild MR, trace TR  11/1/23  LHC showed pLAD 70 % stenosis in the ostium portion of the segment and 100 % in-stent restenosis and in mRCA, 100 % stenosis.   11/1/23 RHC; RA 23 Vw 24, PA 52/33/40, PCWP 30 Vw 33, AO 85/66/73, PA sat 54.4%, CO/CI (F) 2.96/1.44, IABP was placed during the cath through R common femoral artery.   11/1/23 TTE: LVIDd 6.4cm , LVEF 32%, regional WMA, grade II DD,  TAPSE 1.7cm, mld MR, trace TR,     Bedside hemodynamics  11/3: IABP 1:1 RA 5; PA 27/12/18; CO/CI 5.6/2.6; MAP 90-100s.  11/4/23 IABP 1:3 CVP 4 PA 37/18 SvO2 83.8 CO/CI 11.2 CI 5.1  (in the setting of fever to 38.3C)  11/5 IABP 1:1 CVP 3 PA 48/27 SvO2 78.4% CO 8.2 CI 3.7   11/1 (IABP 1:1): CVP 1, PA 33/5/16, MvO2 74.3%

## 2023-11-20 NOTE — PROGRESS NOTE ADULT - ASSESSMENT
59 yo male h/o htn, cad s/p pci, ICH, here with NSTEMI  s/p intubation and cath. now in CCU    NSTEMI  s/p  cath  cath results noted. multi vessel dz., CTSx f/u.   hep gtt  pt with vtach/fib arrest again on 11/8. s/p ACLS and ROSC.   now extubated  IABP in place  mngt as per CCU  plan for transplant vs LVAD as per HF and transplant team  transplant w/u ongoing.   s/p colonoscopy 11/17. normal    LV thrombus   hep gtt    acute on chronic systolic heart failure  heart failure team f/u      pna  recently diagnosed outpt  iv abx now stopped  f/u cult   id following     covid  supportive care    h/o ICH  resolved    agitation  psy consult f/u    bacteremia  id following  iv abx  f/u repeat cult    mngt as per CCU team  d/w CCU attn         Advanced care planning was discussed with patient and family.  Advanced care planning forms were reviewed and discussed as appropriate.  Differential diagnosis and plan of care discussed with patient after the evaluation.   Pain assessed and judicious use of narcotics when appropriate was discussed.  Importance of Fall prevention discussed.  Counseling on Smoking and Alcohol cessation was offered when appropriate.  Counseling on Diet, exercise, and medication compliance was done.       Approx 75 minutes spent.

## 2023-11-20 NOTE — PROGRESS NOTE ADULT - ASSESSMENT
59 yo M with PMHx of HTN, CAD w/ 1 stent in 2009, ICH (2008) presented on 11/1 with abn ekg. Patient presented to [facility] where he was found to have STEMI, recommended to get cath however patient did not want to get it there so he left and came here.   EKG here with ST depression in lateral leads and elevation in anterior leads   Prior to [facility] found to be tachycardic, dyspneic, intubated   [facility] 11/1 with chronic total occlusion of LAD and RCA, with elevated RA and PA pressures  TTE 11/1 with severely decreased EF 32%, s/p IABP 11/1    RVP (11/1) Positive for COVID19  RVP (11/10) Negative  BCx (11/2, 11/4) NGTD  ETT Culture (11/2) NGTD  MRSA/MSSA PCR (11/2, 11/10) Negative  UA (11/10) 1 WBC    CXR (11/10) Clear Lungs  TTE (11/2) Left ventricular thrombus.    #Enterococcus Bacteremia, Leukocytosis, Pre-Heart Transplant Evaluation Serologies  Concern for either central line or abdominal source  CT A/P Noncontrast (11/18) No acute process  Given mixed cultures with Staph epi and Enterococcus faecalis would continue Vancomycin   Patient's penicillin allergy is questionable and patient does not recall reaction.   Anticipate we can utilize Ampicillin in the future with close monitoring  --Recommend removal/exchange of Balloon Pump   --Continue Vancomycin 1.5g IV Q24H. Check trough prior to third dose. Target trough 15-20  --Continue to monitor renal function and vancomycin levels to avoid nephrotoxicity / ototoxicity secondary to vancomycin  --Continue to follow CBC with diff  --Continue to follow temperature curve  --Follow up on susceptibility of Enterococcus faecalis shows Vanco and Ampi sensitive  --Follow up on repeat blood cultures from 11/19 are NGTD  --Would check TTE to look for vegetations    #COVID19  RVP (11/1) Positive for COVID19  RVP (11/10) Negative  Did not receive therapy for COVID19  Low suspicion that hypoxia currently is secondary to COVID19 (favor cardiogenic causes)  s/p isolation for COVID19    #Encounter to Vaccinate Patient  COVID19: COVID19 Infection This Admission. Would consider Vaccination in ~3 months  Influenza: Will require  Pneumococcal: Would benefit from PCV20  HAV: Will require Havrix  HBV: Will require Heplisav  MMR: Not Mumps Immune, if >1 month until transplant would consider MMR dose  Varicella: Immune  Shingles: Will require Shingrix  Tdap: Will require Tdap    Chao Peters MD  Can be called via Teams  After 5pm/weekends [phone]

## 2023-11-20 NOTE — PROGRESS NOTE ADULT - ASSESSMENT
58 yrs old male w/ hx of HFrEF (LVIDd 6.4 cm, LVEF 32%), CAD s/p PCI (2008), HTN, DMT2 (A1c 8.3%) and CVA s/p TPA (2018), recently treated for PNA who initially presented to [facility] via EMS after syncope reportedly requiring defibrilation. Treated for ACS but left AMA to come to [facility]. Pt has covid and was placed on IABP for hypotension. Now being evaluated for Heart transplant Vs LVAD placement. Nephrology consulted for ARIC

## 2023-11-20 NOTE — PROGRESS NOTE ADULT - SUBJECTIVE AND OBJECTIVE BOX
ADVANCED HEART FAILURE & TRANSPLANT  - PROGRESS NOTE  *To reach the NS2 Team from 8am to 5pm (MON-FRI), please call [phone].   _______________________________________________________________________________________________________      Subjective:    Medications:  aMIOdarone    Tablet   Oral   aMIOdarone    Tablet 200 milliGRAM(s) Oral daily  aspirin  chewable 81 milliGRAM(s) Oral daily  atorvastatin 80 milliGRAM(s) Oral at bedtime  budesonide  80 MICROgram(s)/formoterol 4.5 MICROgram(s) Inhaler 2 Puff(s) Inhalation two times a day  carvedilol 25 milliGRAM(s) Oral every 12 hours  chlorhexidine 2% Cloths 1 Application(s) Topical daily  dextrose 50% Injectable 25 Gram(s) IV Push once  dextrose 50% Injectable 12.5 Gram(s) IV Push once  heparin  Infusion 1600 Unit(s)/Hr IV Continuous <Continuous>  hydrALAZINE 100 milliGRAM(s) Oral three times a day  insulin glargine Injectable (LANTUS) 12 Unit(s) SubCutaneous at bedtime  insulin lispro (ADMELOG) corrective regimen sliding scale   SubCutaneous three times a day before meals  insulin lispro (ADMELOG) corrective regimen sliding scale   SubCutaneous at bedtime  insulin lispro Injectable (ADMELOG) 8 Unit(s) SubCutaneous three times a day before meals  isosorbide   dinitrate Tablet (ISORDIL) 40 milliGRAM(s) Oral three times a day  polyethylene glycol 3350 17 Gram(s) Oral two times a day  senna 2 Tablet(s) Oral at bedtime  spironolactone 25 milliGRAM(s) Oral daily  vancomycin  IVPB 1500 milliGRAM(s) IV Intermittent once      Physical Exam:    Vitals:  Vital Signs Last 24 Hours  T(C): 36.8 (23 @ 08:00), Max: 37.1 (11-20-23 @ 00:00)  HR: 77 (23 @ 11:00) (69 - 83)  BP: 104/65 (23 @ 08:00) (104/65 - 104/65)  RR: 19 (23 @ 11:00) (16 - 25)  SpO2: 97% (23 @ 11:00) (94% - 98%)    Weight in k.2 ( @ 06:00)    I&O's Summary    2023 07:  -  2023 07:00  --------------------------------------------------------  IN: 1324 mL / OUT: 1940 mL / NET: -616 mL    2023 07:01  -  2023 11:33  --------------------------------------------------------  IN: 292 mL / OUT: 0 mL / NET: 292 mL        Tele:    General: No distress. Comfortable.  HEENT: EOM intact.  Neck: Neck supple. JVP not elevated. No masses  Chest: Clear to auscultation bilaterally  CV: Normal S1 and S2. No murmurs, rub, or gallops. Radial pulses normal.  Abdomen: Soft, non-distended, non-tender  Skin: No rashes or skin breakdown  Extremities: No LE edema  Neurology: Alert and oriented times three. Sensation intact  Psych: Affect normal    Labs:                        11.2   11.22 )-----------( 274      ( 2023 00:09 )             33.4         130<L>  |  100  |  38<H>  ----------------------------<  185<H>  4.5   |  16<L>  |  1.79<H>    Ca    9.8      2023 00:09  Phos  3.5       Mg     1.9         TPro  7.6  /  Alb  3.7  /  TBili  0.4  /  DBili  x   /  AST  25  /  ALT  46<H>  /  AlkPhos  69      PT/INR - ( 2023 00:09 )   PT: 12.6 sec;   INR: 1.15 ratio         PTT - ( 2023 07:53 )  PTT:73.8 sec               ADVANCED HEART FAILURE & TRANSPLANT  - PROGRESS NOTE  *To reach the NS2 Team from 8am to 5pm (MON-FRI), please call [phone].   _______________________________________________________________________________________________________      Subjective:  - Seen laying flat denies orthopnea, no PND      Medications:  aMIOdarone    Tablet   Oral   aMIOdarone    Tablet 200 milliGRAM(s) Oral daily  aspirin  chewable 81 milliGRAM(s) Oral daily  atorvastatin 80 milliGRAM(s) Oral at bedtime  budesonide  80 MICROgram(s)/formoterol 4.5 MICROgram(s) Inhaler 2 Puff(s) Inhalation two times a day  carvedilol 25 milliGRAM(s) Oral every 12 hours  chlorhexidine 2% Cloths 1 Application(s) Topical daily  dextrose 50% Injectable 25 Gram(s) IV Push once  dextrose 50% Injectable 12.5 Gram(s) IV Push once  heparin  Infusion 1600 Unit(s)/Hr IV Continuous <Continuous>  hydrALAZINE 100 milliGRAM(s) Oral three times a day  insulin glargine Injectable (LANTUS) 12 Unit(s) SubCutaneous at bedtime  insulin lispro (ADMELOG) corrective regimen sliding scale   SubCutaneous three times a day before meals  insulin lispro (ADMELOG) corrective regimen sliding scale   SubCutaneous at bedtime  insulin lispro Injectable (ADMELOG) 8 Unit(s) SubCutaneous three times a day before meals  isosorbide   dinitrate Tablet (ISORDIL) 40 milliGRAM(s) Oral three times a day  polyethylene glycol 3350 17 Gram(s) Oral two times a day  senna 2 Tablet(s) Oral at bedtime  spironolactone 25 milliGRAM(s) Oral daily  vancomycin  IVPB 1500 milliGRAM(s) IV Intermittent once      Physical Exam:    Vitals:  Vital Signs Last 24 Hours  T(C): 36.8 (23 @ 08:00), Max: 37.1 (23 @ 00:00)  HR: 77 (23 @ 11:00) (69 - 83)  BP: 104/65 (23 @ 08:00) (104/65 - 104/65)  RR: 19 (23 @ 11:00) (16 - 25)  SpO2: 97% (23 @ 11:00) (94% - 98%)    Weight in k.2 ( @ 06:00)    I&O's Summary    2023 07:  -  2023 07:00  --------------------------------------------------------  IN: 1324 mL / OUT: 1940 mL / NET: -616 mL    2023 07:01  -  2023 11:33  --------------------------------------------------------  IN: 292 mL / OUT: 0 mL / NET: 292 mL    Tele: SR 60-70's    General: No distress. Comfortable.  HEENT: EOM intact.  Neck: JVP not elevated  Chest: Clear to auscultation bilaterally  CV: Normal S1 and S2. No murmurs, rub, or gallops. Radial pulses normal, warm peripherally  Abdomen: Soft, non-distended, non-tender  Skin: No rashes or skin breakdown, R groin IABP site benign  Extremities: No LE edema  Neurology: Alert and oriented times three. Sensation intact  Psych: Affect normal    Labs:                        11.2   11.22 )-----------( 274      ( 2023 00:09 )             33.4         130<L>  |  100  |  38<H>  ----------------------------<  185<H>  4.5   |  16<L>  |  1.79<H>    Ca    9.8      2023 00:09  Phos  3.5     11-20  Mg     1.9     11-20    TPro  7.6  /  Alb  3.7  /  TBili  0.4  /  DBili  x   /  AST  25  /  ALT  46<H>  /  AlkPhos  69  11-20    PT/INR - ( 2023 00:09 )   PT: 12.6 sec;   INR: 1.15 ratio    PTT - ( 2023 07:53 )  PTT:73.8 sec

## 2023-11-20 NOTE — BH CONSULTATION LIAISON PROGRESS NOTE - NSBHFUPINTERVALHXFT_PSY_A_CORE
Mr. Hardy is a 58 year-old man with no prior psychiatric h/o and past medical history of HTN, CAD (1 stent in 2009), ICH (2008) presented on 11/1 with abnormal EKG. Patient admitted to [facility] for NSTEMI, s/p intubation and catheterization. He is admitted to CCU.   Psychiatry was consulted for "preoperative clearance for heart transplant".   Patient seen, A & O X 3, calm, pleasant on approach. Patient reported that he is here for heart failure, had recent b/l pneumonia and then got infected with COVID-19. He has been dealing with cardiac issues since many years, had a stent placed in 2009. Reported that he continued to f/u with his Cardiologist and PCP, was regular in his appointments and followed up recommendations. Reported that he has a , has been exercising and watching his diet. However beginning of this month, he suffered chest pain, nausea, vomiting so his family brought him to [facility]. He didn't want to continue treatment there, so came here for further management. He believes "genetics" contributed to heart failure. Patient is aware that the team is planning to have heart transplantation for him. He met with the transplant team, seemed to have a good understanding of the indications, risks vs benefits of the procedure. He is aware of risk of infection, bleeding, graft rejection, death from complications. He is aware that he needs to be on long term immunosuppressants post surgery. Notes that he has  reliable social support system. He described his wife as his primary support person who will be taking care of him post surgery. He also noted his three close friends and 3 children aged 31, 26 and 16 are also available for support.   In terms of ROS, patient reported that he was anxious at the beginning of this hospitalization in context of medical comorbidities. He was getting Ativan as needed at that time. Reported one incident with one of the nurses "when she wasn't receptive to his complaints". However as the hospitalization progressed, he has been feeling much calmer. Denied any issues with the staff. Denied feeling anxious on evaluation. Denied feeling depressed. No acute issues with sleep and appetite. Denied having any perceptual disturbances including AH, VH, paranoia. Denied SI/HI/I/P.  Mr. Hardy denied any past psychiatric h/o, no history of self-harm/SA. He also denied any history of violent thoughts or behaviors. Denies history of outpatient treatment with a therapist or Psychiatrist. He sees himself as a "workaholic", "perfectionist". Stated that he has been working 7 days a week. There are days when he can get overwhelmed/anxious with work life, takes low dose Ativan 0.5 as needed prescribed by PCP. Reported that he doesn't take it everyday, last month he only took it twice. No h/o substance use, denied use of alcohol, nicotine and illicit drugs. Denied FH of mental illness.  He lives with his wife and 3 children. 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

## 2023-11-20 NOTE — BH CONSULTATION LIAISON PROGRESS NOTE - NSBHCHARTREVIEWLAB_PSY_A_CORE FT
11.2   11.22 )-----------( 274      ( 20 Nov 2023 00:09 )             33.4     11-20    130<L>  |  100  |  38<H>  ----------------------------<  185<H>  4.5   |  16<L>  |  1.79<H>    Ca    9.8      20 Nov 2023 00:09  Phos  3.5     11-20  Mg     1.9     11-20    TPro  7.6  /  Alb  3.7  /  TBili  0.4  /  DBili  x   /  AST  25  /  ALT  46<H>  /  AlkPhos  69  11-20    Urinalysis Basic - ( 20 Nov 2023 00:09 )    Color: x / Appearance: x / SG: x / pH: x  Gluc: 185 mg/dL / Ketone: x  / Bili: x / Urobili: x   Blood: x / Protein: x / Nitrite: x   Leuk Esterase: x / RBC: x / WBC x   Sq Epi: x / Non Sq Epi: x / Bacteria: x

## 2023-11-20 NOTE — PROGRESS NOTE ADULT - ASSESSMENT
====================ASSESSMENT ======================  58 male with HTN, CAD (s/p PCI 2008), HFrEF, CVA 2018, and T2DM presenting with chest pressure and unknown tachycardia that was shocked x1, [facility] 11/1 found to have in-stent restenosis of pLAD and  of RCA with elevated RA and PA pressures and severely decreased EF 32%, admitted to CICU for management of cardiogenic shock requiring IABP 11/1 -11/7, with hospital course c/b vfib arrest requiring reinsertion of IABP. Currently undergoing workup for AT evaluation with HF.    Plan:  ====================== NEUROLOGY=====================  # Anxiety  - Ativan 0.5 mg PO Q 6hrs PRN   - No Seroquel/antipsychotics since vfib arrest and prolonged QTC  - Psych Eval last week, recs for continuing Ativan PRN and had recommended SSRI, but pt. refused   - Needs psych eval for tx     # Pain 2/2 rib fractures post CPR  - c/w multimodal pain control w/ tylenol and lidoderm TD    ==================== RESPIRATORY======================  # Acute Hypoxemic Respiratory Failure  - s/p x2 intubations for cardiogenic pulm edema and the in setting of cardiac arrest, resolved - extubated 11/10  - Currently on room air with spO2 mid 90s  - Continue incentive spirometry and monitoring of sp02    # Asthma  - c/w albuterol, symbicort and spiriva  - on trelegy at home    ====================CARDIOVASCULAR===================  # Vfib arrest i/s/o ischemia  - Lido gtt off since 11/13  - PO Amio load dose (s/p IV amio ~1950mg IV gtt) for total of 5 grams per EP to complete 11/17  - Keep K > 4, Mag > 2.2     # Cardiogenic shock requiring IABP (11/1- 11/7, 11/9-)  - Likely 2/2 NSTEMI and ADHF  - 11/1 LHC: pLAD 100 % in-stent restenosis & mRCA, 100 %. PCWP 30. IABP placed.  - 11/1 TTE: LV dilated. EF 32 %. Regional WMAs present, mod (grade 2) LV diastolic dysfunction  - 11/2 TTE: EF 22% and + LV thrombus   - IABP removed 11/7, vfib 11/9, IABP later reinserted that day for CS. continue IABP 1:1  - Off Milrinone gtt @ 7:30 am 11/11  - Currently on hydralazine 100 TID and ISD 40 TID for AL reduction  - Holding diuretics at this time, PRN Bumex IVP as needed  - Continue romel 25 daily and coreg 25 BID for GDMT  - AT Eval, F/u HF recs    # NSTEMI iso stent re-occlusion of pLAD and 100%  of RCA  - EKG on admission w/LBBB  - DAPT: c/w ASA, Brilinta d/c'd per transplant w/u  - c/w lipitor 80  - cMR deferred given necessity of IABP  - CT sx not recommending CABG, undergoing AT eval    # LV thrombus  - c/w heparin gtt    ===================HEMATOLOGIC/ONC ===================  # VTE prophylaxis   - c/w heparin gtt given LV Thrombus and IABP   - H/H and platelets stable  - Continue to monitor daily    right IJ ?hematoma  - US ordered, r/o clot vs. hematoma    ===================== RENAL =========================  # Non-oliguric ARIC   - sCr stable, Baseline Cr: 1-1.22  - Diuresis held, optimize pre-renal factors for optimal perfusion and volume status  - Trend BMP, lytes daily, replace as needed  - Continue Strict I/Os, avoid nephrotoxins    ==================== GASTROINTESTINAL===================  DASH diet  - Continue to monitor for BM    GERD  - c/w home protonix    =======================    ENDOCRINE  =====================  # Type 2 DM  - A1c 8.3  - Insulin gtt transitioned off 11/11 AM  - Continue lantus, premeal, low ISS    ========================INFECTIOUS DISEASE================  # Enterococcus faecalis bacteremia  - Concern for aspiration event prior to intubation on admission - s/p vanco and cefepime course  - BCx 11/10 NGTD, RVP neg, MRSA neg, UA neg  - BCx 11/17+ for enterococcus, started on Vanco (by level) 11/18  - f/u BCx and urine cx 11/18  - Plan for IABP swap 11/20 as per ID   - CT A/P negative for infectious pathology    # COVID, resolved  - Off airborne precautions 11/11    # Pre-transplant ID w/u   - Trend ID recs for serologies   - Colonoscopy 11/17 - normal   - Chest CT 11/17 - improved LLL aeration  - f/u abdominal US    LINES:   RIJ [place] 11/9-11/14, RAx [place] 11/9-11/18, LFA IABP 11/9 -    Ethics/Dispo: full code, maintain in CICU    Patient requires continuous monitoring with bedside rhythm monitoring, pulse ox monitoring, and intermittent blood gas analysis. Care plan discussed with ICU care team. Patient remained critical and at risk for life threatening decompensation.  Patient seen, examined and plan discussed with CCU team during rounds.     I have personally provided 30 minutes of critical care time excluding time spent on separate procedures, in addition to initial critical care time provided by the CICU Attending, Dr. Her.    Rita Henry, [facility]

## 2023-11-20 NOTE — BH CONSULTATION LIAISON PROGRESS NOTE - NSBHCONSULTFOLLOWAFTERCARE_PSY_A_CORE FT
Patient doesn't meet criteria for inpatient psychiatric hospitalization.  He can f/u with his PCP   Patient can f/u [facility], [address], [facility], [address] Floor, [place] [id number]. [phone]

## 2023-11-20 NOTE — PROGRESS NOTE ADULT - SUBJECTIVE AND OBJECTIVE BOX
PATIENT:  LD VALENICA  [id number]    CHIEF COMPLAINT:  Patient is a 58y old male who presents with a chief complaint of Cardiogenic shock (19 Nov 2023 20:04)    INTERVAL HISTORY/OVERNIGHT EVENTS:      REVIEW OF SYSTEMS:    Constitutional:     [ ] negative [ ] fevers [ ] chills [ ] weight loss [ ] weight gain  HEENT:                  [ ] negative [ ] dry eyes [ ] eye irritation [ ] postnasal drip [ ] nasal congestion  CV:                         [ ] negative  [ ] chest pain [ ] orthopnea [ ] palpitations [ ] murmur  Resp:                     [ ] negative [ ] cough [ ] shortness of breath [ ] dyspnea [ ] wheezing [ ] sputum [ ] hemoptysis  GI:                          [ ] negative [ ] nausea [ ] vomiting [ ] diarrhea [ ] constipation [ ] abd pain [ ] dysphagia   :                        [ ] negative [ ] dysuria [ ] nocturia [ ] hematuria [ ] increased urinary frequency  Musculoskeletal: [ ] negative [ ] back pain [ ] myalgias [ ] arthralgias [ ] fracture  Skin:                       [ ] negative [ ] rash [ ] itch  Neurological:        [ ] negative [ ] headache [ ] dizziness [ ] syncope [ ] weakness [ ] numbness  Psychiatric:           [ ] negative [ ] anxiety [ ] depression    [ ] All other systems negative    MEDICATIONS:  MEDICATIONS  (STANDING):  aMIOdarone    Tablet   Oral   aMIOdarone    Tablet 200 milliGRAM(s) Oral daily  aspirin  chewable 81 milliGRAM(s) Oral daily  atorvastatin 80 milliGRAM(s) Oral at bedtime  budesonide  80 MICROgram(s)/formoterol 4.5 MICROgram(s) Inhaler 2 Puff(s) Inhalation two times a day  carvedilol 25 milliGRAM(s) Oral every 12 hours  chlorhexidine 2% Cloths 1 Application(s) Topical daily  dextrose 50% Injectable 25 Gram(s) IV Push once  dextrose 50% Injectable 12.5 Gram(s) IV Push once  heparin  Infusion 1600 Unit(s)/Hr (13 mL/Hr) IV Continuous <Continuous>  hydrALAZINE 100 milliGRAM(s) Oral three times a day  insulin glargine Injectable (LANTUS) 12 Unit(s) SubCutaneous at bedtime  insulin lispro (ADMELOG) corrective regimen sliding scale   SubCutaneous three times a day before meals  insulin lispro (ADMELOG) corrective regimen sliding scale   SubCutaneous at bedtime  insulin lispro Injectable (ADMELOG) 8 Unit(s) SubCutaneous three times a day before meals  isosorbide   dinitrate Tablet (ISORDIL) 40 milliGRAM(s) Oral three times a day  spironolactone 25 milliGRAM(s) Oral daily  vancomycin  IVPB 1500 milliGRAM(s) IV Intermittent once    MEDICATIONS  (PRN):      ALLERGIES:  penicillins (Unknown)    OBJECTIVE:  ICU Vital Signs Last 24 Hrs  T(C): 36.9 (20 Nov 2023 04:00), Max: 37.1 (20 Nov 2023 00:00)  T(F): 98.4 (20 Nov 2023 04:00), Max: 98.8 (20 Nov 2023 00:00)  HR: 78 (20 Nov 2023 06:00) (69 - 83)  BP: 121/59 (19 Nov 2023 08:00) (121/59 - 121/59)  BP(mean): 77 (19 Nov 2023 08:00) (77 - 77)  RR: 20 (20 Nov 2023 06:00) (17 - 25)  SpO2: 94% (20 Nov 2023 06:00) (94% - 98%)    O2 Parameters below as of 20 Nov 2023 06:00  Patient On (Oxygen Delivery Method): room air    CAPILLARY BLOOD GLUCOSE  POCT Blood Glucose.: 207 mg/dL (19 Nov 2023 22:50)  POCT Blood Glucose.: 261 mg/dL (19 Nov 2023 15:46)  POCT Blood Glucose.: 122 mg/dL (19 Nov 2023 11:48)  POCT Blood Glucose.: 159 mg/dL (19 Nov 2023 08:06)    I&O's Summary    18 Nov 2023 07:01  -  19 Nov 2023 07:00  --------------------------------------------------------  IN: 1408 mL / OUT: 1885 mL / NET: -477 mL    19 Nov 2023 07:01  -  20 Nov 2023 06:24  --------------------------------------------------------  IN: 1261 mL / OUT: 1940 mL / NET: -679 mL    PHYSICAL EXAMINATION:  General: WN/WD NAD  HEENT: PERRLA, EOMI, moist mucous membranes  Neurology: A&Ox3, nonfocal, MOISE x 4  Respiratory: CTA B/L, normal respiratory effort, no wheezes, crackles, rales  CV: RRR, S1S2, no murmurs, rubs or gallops  Abdominal: Soft, NT, ND +BS, Last BM  Extremities: No edema, + peripheral pulses  Incisions:   Tubes:    LABS:                          11.2   11.22 )-----------( 274      ( 20 Nov 2023 00:09 )             33.4     11-20    130<L>  |  100  |  38<H>  ----------------------------<  185<H>  4.5   |  16<L>  |  1.79<H>    Ca    9.8      20 Nov 2023 00:09  Phos  3.5     11-20  Mg     1.9     11-20    TPro  7.6  /  Alb  3.7  /  TBili  0.4  /  DBili  x   /  AST  25  /  ALT  46<H>  /  AlkPhos  69  11-20    LIVER FUNCTIONS - ( 20 Nov 2023 00:09 )  Alb: 3.7 g/dL / Pro: 7.6 g/dL / ALK PHOS: 69 U/L / ALT: 46 U/L / AST: 25 U/L / GGT: x           PT/INR - ( 20 Nov 2023 00:09 )   PT: 12.6 sec;   INR: 1.15 ratio    PTT - ( 20 Nov 2023 00:09 )  PTT: 56.1 sec PATIENT:  LD VALENCIA  [id number]    CHIEF COMPLAINT:  Patient is a 58y old male who presents with a chief complaint of Cardiogenic shock (19 Nov 2023 20:04)    INTERVAL HISTORY/OVERNIGHT EVENTS:  - Right IJ induration, ultrasound ordered    REVIEW OF SYSTEMS:    Constitutional:     [X] negative [ ] fevers [ ] chills [ ] weight loss [ ] weight gain  HEENT:                  [X] negative [ ] dry eyes [ ] eye irritation [ ] postnasal drip [ ] nasal congestion  CV:                         [X] negative  [ ] chest pain [ ] orthopnea [ ] palpitations [ ] murmur  Resp:                     [X] negative [ ] cough [ ] shortness of breath [ ] dyspnea [ ] wheezing [ ] sputum [ ] hemoptysis  GI:                          [X] negative [ ] nausea [ ] vomiting [ ] diarrhea [ ] constipation [ ] abd pain [ ] dysphagia   :                        [X] negative [ ] dysuria [ ] nocturia [ ] hematuria [ ] increased urinary frequency  Musculoskeletal: [X] negative [ ] back pain [ ] myalgias [ ] arthralgias [ ] fracture  Skin:                       [X] negative [ ] rash [ ] itch  Neurological:        [X] negative [ ] headache [ ] dizziness [ ] syncope [ ] weakness [ ] numbness  Psychiatric:           [X] negative [ ] anxiety [ ] depression    MEDICATIONS:  MEDICATIONS  (STANDING):  aMIOdarone    Tablet   Oral   aMIOdarone    Tablet 200 milliGRAM(s) Oral daily  aspirin  chewable 81 milliGRAM(s) Oral daily  atorvastatin 80 milliGRAM(s) Oral at bedtime  budesonide  80 MICROgram(s)/formoterol 4.5 MICROgram(s) Inhaler 2 Puff(s) Inhalation two times a day  carvedilol 25 milliGRAM(s) Oral every 12 hours  chlorhexidine 2% Cloths 1 Application(s) Topical daily  dextrose 50% Injectable 25 Gram(s) IV Push once  dextrose 50% Injectable 12.5 Gram(s) IV Push once  heparin  Infusion 1600 Unit(s)/Hr (13 mL/Hr) IV Continuous <Continuous>  hydrALAZINE 100 milliGRAM(s) Oral three times a day  insulin glargine Injectable (LANTUS) 12 Unit(s) SubCutaneous at bedtime  insulin lispro (ADMELOG) corrective regimen sliding scale   SubCutaneous three times a day before meals  insulin lispro (ADMELOG) corrective regimen sliding scale   SubCutaneous at bedtime  insulin lispro Injectable (ADMELOG) 8 Unit(s) SubCutaneous three times a day before meals  isosorbide   dinitrate Tablet (ISORDIL) 40 milliGRAM(s) Oral three times a day  spironolactone 25 milliGRAM(s) Oral daily  vancomycin  IVPB 1500 milliGRAM(s) IV Intermittent once    MEDICATIONS  (PRN):    ALLERGIES:  penicillins (Unknown)    OBJECTIVE:  ICU Vital Signs Last 24 Hrs  T(C): 36.9 (20 Nov 2023 04:00), Max: 37.1 (20 Nov 2023 00:00)  T(F): 98.4 (20 Nov 2023 04:00), Max: 98.8 (20 Nov 2023 00:00)  HR: 78 (20 Nov 2023 06:00) (69 - 83)  BP: 121/59 (19 Nov 2023 08:00) (121/59 - 121/59)  BP(mean): 77 (19 Nov 2023 08:00) (77 - 77)  RR: 20 (20 Nov 2023 06:00) (17 - 25)  SpO2: 94% (20 Nov 2023 06:00) (94% - 98%)    O2 Parameters below as of 20 Nov 2023 06:00  Patient On (Oxygen Delivery Method): room air    CAPILLARY BLOOD GLUCOSE  POCT Blood Glucose.: 207 mg/dL (19 Nov 2023 22:50)  POCT Blood Glucose.: 261 mg/dL (19 Nov 2023 15:46)  POCT Blood Glucose.: 122 mg/dL (19 Nov 2023 11:48)  POCT Blood Glucose.: 159 mg/dL (19 Nov 2023 08:06)    I&O's Summary    18 Nov 2023 07:01  -  19 Nov 2023 07:00  --------------------------------------------------------  IN: 1408 mL / OUT: 1885 mL / NET: -477 mL    19 Nov 2023 07:01  -  20 Nov 2023 06:24  --------------------------------------------------------  IN: 1261 mL / OUT: 1940 mL / NET: -679 mL    PHYSICAL EXAMINATION:  General: WN/WD NAD  HEENT: PERRLA, EOMI, moist mucous membranes  Neurology: A&Ox3, nonfocal, MOISE x 4  Respiratory: CTA B/L, normal respiratory effort, no wheezes, crackles, rales  CV: RRR, S1S2, no murmurs, rubs or gallops  Abdominal: Soft, NT, ND +BS  Extremities: Warm and dry, no edema, + peripheral pulses  Lines: CDI    LABS:                          11.2   11.22 )-----------( 274      ( 20 Nov 2023 00:09 )             33.4     11-20    130<L>  |  100  |  38<H>  ----------------------------<  185<H>  4.5   |  16<L>  |  1.79<H>    Ca    9.8      20 Nov 2023 00:09  Phos  3.5     11-20  Mg     1.9     11-20    TPro  7.6  /  Alb  3.7  /  TBili  0.4  /  DBili  x   /  AST  25  /  ALT  46<H>  /  AlkPhos  69  11-20    LIVER FUNCTIONS - ( 20 Nov 2023 00:09 )  Alb: 3.7 g/dL / Pro: 7.6 g/dL / ALK PHOS: 69 U/L / ALT: 46 U/L / AST: 25 U/L / GGT: x           PT/INR - ( 20 Nov 2023 00:09 )   PT: 12.6 sec;   INR: 1.15 ratio    PTT - ( 20 Nov 2023 00:09 )  PTT: 56.1 sec

## 2023-11-20 NOTE — BH CONSULTATION LIAISON PROGRESS NOTE - NSBHASSESSMENTFT_PSY_ALL_CORE
Mr. Hardy is a 58 year-old man with no prior psychiatric h/o and past medical history of HTN, CAD (1 stent in 2009), ICH (2008) presented on 11/1 with abnormal EKG. Patient admitted to [facility] for NSTEMI, s/p intubation and catheterization. He is admitted to CCU.   Psychiatry was consulted for "preoperative clearance for heart transplant".     Patient seen at the bedside. Cam, controlled, no signs of acute anxiety/agitation/severe depression. Denied any perceptual disturbances, denied SI/HI/I/P. Patient seemed to have a good understanding of the indications, risks vs benefits of the procedure. He is in agreement to undergo heart transplantation.   SIPAT score- 11    Plan:  Routine observation  Can use Ativan 0.5 mg PO BID PRN for anxiety  No standing psychotropics at this time. Patient not interested in long term psychotropics  Continue to f/u with Psychology team  Rest of the medical workup as per primary team Mr. Hardy is a 58 year-old man with no formal prior psychiatric h/o and past medical history of HTN, CAD (1 stent in 2009), ICH (2008) presented on 11/1 with abnormal EKG. Patient admitted to [facility] for NSTEMI, s/p intubation and catheterization. He is admitted to CCU.   Psychiatry was consulted for "preoperative clearance for heart transplant".     Patient seen at the bedside. Cam, controlled, no signs of acute anxiety/agitation/severe depression. Denied any perceptual disturbances, denied SI/HI/I/P. He is on low dose of Ativan 0.5 mg at home, hasn't required it here since past 1 week. Feels stable. Patient seemed to have a good understanding of the indications, risks vs benefits of the procedure. He is in agreement to undergo heart transplantation.   SIPAT score- 11    Plan:  Routine observation  Can use Atarax 25 mg PO BID PRN for anxiety  No standing psychotropics at this time. Patient not interested in long term psychotropics  Continue to f/u with Psychology team  Rest of the medical workup as per primary team Mr. Hardy is a 58 year-old man with no formal prior psychiatric h/o and past medical history of HTN, CAD (1 stent in 2009), ICH (2008) presented on 11/1 with abnormal EKG. Patient admitted to [facility] for NSTEMI, s/p intubation and catheterization. He is admitted to CCU.   Psychiatry was consulted for "preoperative clearance for heart transplant".     Patient seen at the bedside. Cam, controlled, no signs of acute anxiety/agitation/severe depression. Denied any perceptual disturbances, denied SI/HI/I/P. He is on low dose of Ativan 0.5 mg at home, hasn't required it here since past 1 week. Feels stable. Patient seemed to have a good understanding of the indications, risks vs benefits of the procedure. He is in agreement to undergo heart transplantation.   SIPAT score- 11    Plan:  Routine observation  Can use Atarax 10 mg PO TID PRN for anxiety; if patient amenable to increase in dose consider Atarax 25 mg PO BID PRN anxiety   No standing psychotropics at this time. Patient not interested in long term psychotropics  Continue to f/u with Psychology team  Rest of the medical workup as per primary team

## 2023-11-20 NOTE — PROGRESS NOTE ADULT - NS ATTEND AMEND GEN_ALL_CORE FT
Patient supported on IABP 1:1, no inotropes at this time  Unfortunately developed bacteremia over the weekend  Appreciate transpant ID recs and whne patient can be listed once evaluation is complete  Will plan for IABP exchange to other side  Pending ultrasound of abdomen for hepatology clearance

## 2023-11-20 NOTE — PROGRESS NOTE ADULT - CRITICAL CARE ATTENDING COMMENT
57yo M HTN CAD prior stroke DM, HFrEF admitted with cardiogenic and now septic shock with E Faecalis/ staph epi bacteremia.  Neuro: no acute deficits  CV: Cardiogenic shock on IABP, off milrinone. IABP exchange today and cont PO afterload reduction. Cont PO amio for VT. Continue with ASA  Resp: on RA  GI: DASH DM diet  Renal: Non-oliguric ARIC that has been improving  Heme: H/H low but acceptable on Heparin drip for LV thrombus + IABP  ID: Afebrile, + bacteremia, on abx, ID is following (will need clearance prior to transplant)  Endo: Sugars overall controlled on Lantus  Lines: LFA IABP to come out today

## 2023-11-21 LAB
-  AMPICILLIN/SULBACTAM: SIGNIFICANT CHANGE UP
-  AMPICILLIN/SULBACTAM: SIGNIFICANT CHANGE UP
-  CEFAZOLIN: SIGNIFICANT CHANGE UP
-  CEFAZOLIN: SIGNIFICANT CHANGE UP
-  CLINDAMYCIN: SIGNIFICANT CHANGE UP
-  CLINDAMYCIN: SIGNIFICANT CHANGE UP
-  ERYTHROMYCIN: SIGNIFICANT CHANGE UP
-  ERYTHROMYCIN: SIGNIFICANT CHANGE UP
-  GENTAMICIN: SIGNIFICANT CHANGE UP
-  GENTAMICIN: SIGNIFICANT CHANGE UP
-  OXACILLIN: SIGNIFICANT CHANGE UP
-  OXACILLIN: SIGNIFICANT CHANGE UP
-  PENICILLIN: SIGNIFICANT CHANGE UP
-  PENICILLIN: SIGNIFICANT CHANGE UP
-  RIFAMPIN: SIGNIFICANT CHANGE UP
-  RIFAMPIN: SIGNIFICANT CHANGE UP
-  TETRACYCLINE: SIGNIFICANT CHANGE UP
-  TETRACYCLINE: SIGNIFICANT CHANGE UP
-  TRIMETHOPRIM/SULFAMETHOXAZOLE: SIGNIFICANT CHANGE UP
-  TRIMETHOPRIM/SULFAMETHOXAZOLE: SIGNIFICANT CHANGE UP
-  VANCOMYCIN: SIGNIFICANT CHANGE UP
-  VANCOMYCIN: SIGNIFICANT CHANGE UP
ALBUMIN SERPL ELPH-MCNC: 3.8 G/DL — SIGNIFICANT CHANGE UP (ref 3.3–5)
ALBUMIN SERPL ELPH-MCNC: 3.8 G/DL — SIGNIFICANT CHANGE UP (ref 3.3–5)
ALP SERPL-CCNC: 72 U/L — SIGNIFICANT CHANGE UP (ref 40–120)
ALP SERPL-CCNC: 72 U/L — SIGNIFICANT CHANGE UP (ref 40–120)
ALT FLD-CCNC: 47 U/L — HIGH (ref 10–45)
ALT FLD-CCNC: 47 U/L — HIGH (ref 10–45)
ANION GAP SERPL CALC-SCNC: 13 MMOL/L — SIGNIFICANT CHANGE UP (ref 5–17)
ANION GAP SERPL CALC-SCNC: 13 MMOL/L — SIGNIFICANT CHANGE UP (ref 5–17)
APPEARANCE UR: CLEAR — SIGNIFICANT CHANGE UP
APPEARANCE UR: CLEAR — SIGNIFICANT CHANGE UP
APTT BLD: 46.1 SEC — HIGH (ref 24.5–35.6)
APTT BLD: 46.1 SEC — HIGH (ref 24.5–35.6)
APTT BLD: 57.3 SEC — HIGH (ref 24.5–35.6)
APTT BLD: 57.3 SEC — HIGH (ref 24.5–35.6)
APTT BLD: 72 SEC — HIGH (ref 24.5–35.6)
APTT BLD: 72 SEC — HIGH (ref 24.5–35.6)
AST SERPL-CCNC: 22 U/L — SIGNIFICANT CHANGE UP (ref 10–40)
AST SERPL-CCNC: 22 U/L — SIGNIFICANT CHANGE UP (ref 10–40)
BACTERIA # UR AUTO: NEGATIVE /HPF — SIGNIFICANT CHANGE UP
BACTERIA # UR AUTO: NEGATIVE /HPF — SIGNIFICANT CHANGE UP
BASOPHILS # BLD AUTO: 0.05 K/UL — SIGNIFICANT CHANGE UP (ref 0–0.2)
BASOPHILS # BLD AUTO: 0.05 K/UL — SIGNIFICANT CHANGE UP (ref 0–0.2)
BASOPHILS NFR BLD AUTO: 0.4 % — SIGNIFICANT CHANGE UP (ref 0–2)
BASOPHILS NFR BLD AUTO: 0.4 % — SIGNIFICANT CHANGE UP (ref 0–2)
BILIRUB SERPL-MCNC: 0.5 MG/DL — SIGNIFICANT CHANGE UP (ref 0.2–1.2)
BILIRUB SERPL-MCNC: 0.5 MG/DL — SIGNIFICANT CHANGE UP (ref 0.2–1.2)
BILIRUB UR-MCNC: NEGATIVE — SIGNIFICANT CHANGE UP
BILIRUB UR-MCNC: NEGATIVE — SIGNIFICANT CHANGE UP
BUN SERPL-MCNC: 35 MG/DL — HIGH (ref 7–23)
BUN SERPL-MCNC: 35 MG/DL — HIGH (ref 7–23)
CALCIUM SERPL-MCNC: 10 MG/DL — SIGNIFICANT CHANGE UP (ref 8.4–10.5)
CALCIUM SERPL-MCNC: 10 MG/DL — SIGNIFICANT CHANGE UP (ref 8.4–10.5)
CAST: 2 /LPF — SIGNIFICANT CHANGE UP (ref 0–4)
CAST: 2 /LPF — SIGNIFICANT CHANGE UP (ref 0–4)
CHLORIDE SERPL-SCNC: 100 MMOL/L — SIGNIFICANT CHANGE UP (ref 96–108)
CHLORIDE SERPL-SCNC: 100 MMOL/L — SIGNIFICANT CHANGE UP (ref 96–108)
CO2 SERPL-SCNC: 17 MMOL/L — LOW (ref 22–31)
CO2 SERPL-SCNC: 17 MMOL/L — LOW (ref 22–31)
COLOR SPEC: YELLOW — SIGNIFICANT CHANGE UP
COLOR SPEC: YELLOW — SIGNIFICANT CHANGE UP
CREAT ?TM UR-MCNC: 62 MG/DL — SIGNIFICANT CHANGE UP
CREAT ?TM UR-MCNC: 62 MG/DL — SIGNIFICANT CHANGE UP
CREAT SERPL-MCNC: 1.61 MG/DL — HIGH (ref 0.5–1.3)
CREAT SERPL-MCNC: 1.61 MG/DL — HIGH (ref 0.5–1.3)
CULTURE RESULTS: ABNORMAL
CULTURE RESULTS: ABNORMAL
DIFF PNL FLD: NEGATIVE — SIGNIFICANT CHANGE UP
DIFF PNL FLD: NEGATIVE — SIGNIFICANT CHANGE UP
EGFR: 49 ML/MIN/1.73M2 — LOW
EGFR: 49 ML/MIN/1.73M2 — LOW
EOSINOPHIL # BLD AUTO: 0.33 K/UL — SIGNIFICANT CHANGE UP (ref 0–0.5)
EOSINOPHIL # BLD AUTO: 0.33 K/UL — SIGNIFICANT CHANGE UP (ref 0–0.5)
EOSINOPHIL NFR BLD AUTO: 3 % — SIGNIFICANT CHANGE UP (ref 0–6)
EOSINOPHIL NFR BLD AUTO: 3 % — SIGNIFICANT CHANGE UP (ref 0–6)
GBM IGG SER-ACNC: <0.2 — SIGNIFICANT CHANGE UP (ref 0–0.9)
GBM IGG SER-ACNC: <0.2 — SIGNIFICANT CHANGE UP (ref 0–0.9)
GLUCOSE BLDC GLUCOMTR-MCNC: 171 MG/DL — HIGH (ref 70–99)
GLUCOSE BLDC GLUCOMTR-MCNC: 171 MG/DL — HIGH (ref 70–99)
GLUCOSE BLDC GLUCOMTR-MCNC: 172 MG/DL — HIGH (ref 70–99)
GLUCOSE BLDC GLUCOMTR-MCNC: 172 MG/DL — HIGH (ref 70–99)
GLUCOSE BLDC GLUCOMTR-MCNC: 180 MG/DL — HIGH (ref 70–99)
GLUCOSE BLDC GLUCOMTR-MCNC: 180 MG/DL — HIGH (ref 70–99)
GLUCOSE BLDC GLUCOMTR-MCNC: 247 MG/DL — HIGH (ref 70–99)
GLUCOSE BLDC GLUCOMTR-MCNC: 247 MG/DL — HIGH (ref 70–99)
GLUCOSE SERPL-MCNC: 156 MG/DL — HIGH (ref 70–99)
GLUCOSE SERPL-MCNC: 156 MG/DL — HIGH (ref 70–99)
GLUCOSE UR QL: 250 MG/DL
GLUCOSE UR QL: 250 MG/DL
HCT VFR BLD CALC: 34 % — LOW (ref 39–50)
HCT VFR BLD CALC: 34 % — LOW (ref 39–50)
HGB BLD-MCNC: 11.4 G/DL — LOW (ref 13–17)
HGB BLD-MCNC: 11.4 G/DL — LOW (ref 13–17)
IMM GRANULOCYTES NFR BLD AUTO: 0.4 % — SIGNIFICANT CHANGE UP (ref 0–0.9)
IMM GRANULOCYTES NFR BLD AUTO: 0.4 % — SIGNIFICANT CHANGE UP (ref 0–0.9)
INR BLD: 1.13 RATIO — SIGNIFICANT CHANGE UP (ref 0.85–1.18)
INR BLD: 1.13 RATIO — SIGNIFICANT CHANGE UP (ref 0.85–1.18)
INR BLD: 1.17 RATIO — SIGNIFICANT CHANGE UP (ref 0.85–1.18)
INR BLD: 1.17 RATIO — SIGNIFICANT CHANGE UP (ref 0.85–1.18)
INTERPRETATION SERPL IFE-IMP: SIGNIFICANT CHANGE UP
INTERPRETATION SERPL IFE-IMP: SIGNIFICANT CHANGE UP
KETONES UR-MCNC: NEGATIVE MG/DL — SIGNIFICANT CHANGE UP
KETONES UR-MCNC: NEGATIVE MG/DL — SIGNIFICANT CHANGE UP
LACTATE SERPL-SCNC: 1 MMOL/L — SIGNIFICANT CHANGE UP (ref 0.5–2)
LACTATE SERPL-SCNC: 1 MMOL/L — SIGNIFICANT CHANGE UP (ref 0.5–2)
LEUKOCYTE ESTERASE UR-ACNC: NEGATIVE — SIGNIFICANT CHANGE UP
LEUKOCYTE ESTERASE UR-ACNC: NEGATIVE — SIGNIFICANT CHANGE UP
LYMPHOCYTES # BLD AUTO: 1.66 K/UL — SIGNIFICANT CHANGE UP (ref 1–3.3)
LYMPHOCYTES # BLD AUTO: 1.66 K/UL — SIGNIFICANT CHANGE UP (ref 1–3.3)
LYMPHOCYTES # BLD AUTO: 14.9 % — SIGNIFICANT CHANGE UP (ref 13–44)
LYMPHOCYTES # BLD AUTO: 14.9 % — SIGNIFICANT CHANGE UP (ref 13–44)
MAGNESIUM SERPL-MCNC: 2.1 MG/DL — SIGNIFICANT CHANGE UP (ref 1.6–2.6)
MAGNESIUM SERPL-MCNC: 2.1 MG/DL — SIGNIFICANT CHANGE UP (ref 1.6–2.6)
MCHC RBC-ENTMCNC: 29.1 PG — SIGNIFICANT CHANGE UP (ref 27–34)
MCHC RBC-ENTMCNC: 29.1 PG — SIGNIFICANT CHANGE UP (ref 27–34)
MCHC RBC-ENTMCNC: 33.5 GM/DL — SIGNIFICANT CHANGE UP (ref 32–36)
MCHC RBC-ENTMCNC: 33.5 GM/DL — SIGNIFICANT CHANGE UP (ref 32–36)
MCV RBC AUTO: 86.7 FL — SIGNIFICANT CHANGE UP (ref 80–100)
MCV RBC AUTO: 86.7 FL — SIGNIFICANT CHANGE UP (ref 80–100)
METHOD TYPE: SIGNIFICANT CHANGE UP
METHOD TYPE: SIGNIFICANT CHANGE UP
MONOCYTES # BLD AUTO: 0.72 K/UL — SIGNIFICANT CHANGE UP (ref 0–0.9)
MONOCYTES # BLD AUTO: 0.72 K/UL — SIGNIFICANT CHANGE UP (ref 0–0.9)
MONOCYTES NFR BLD AUTO: 6.5 % — SIGNIFICANT CHANGE UP (ref 2–14)
MONOCYTES NFR BLD AUTO: 6.5 % — SIGNIFICANT CHANGE UP (ref 2–14)
NEUTROPHILS # BLD AUTO: 8.34 K/UL — HIGH (ref 1.8–7.4)
NEUTROPHILS # BLD AUTO: 8.34 K/UL — HIGH (ref 1.8–7.4)
NEUTROPHILS NFR BLD AUTO: 74.8 % — SIGNIFICANT CHANGE UP (ref 43–77)
NEUTROPHILS NFR BLD AUTO: 74.8 % — SIGNIFICANT CHANGE UP (ref 43–77)
NITRITE UR-MCNC: NEGATIVE — SIGNIFICANT CHANGE UP
NITRITE UR-MCNC: NEGATIVE — SIGNIFICANT CHANGE UP
NRBC # BLD: 0 /100 WBCS — SIGNIFICANT CHANGE UP (ref 0–0)
NRBC # BLD: 0 /100 WBCS — SIGNIFICANT CHANGE UP (ref 0–0)
ORGANISM # SPEC MICROSCOPIC CNT: ABNORMAL
PH UR: 5 — SIGNIFICANT CHANGE UP (ref 5–8)
PH UR: 5 — SIGNIFICANT CHANGE UP (ref 5–8)
PHOSPHATE SERPL-MCNC: 3.4 MG/DL — SIGNIFICANT CHANGE UP (ref 2.5–4.5)
PHOSPHATE SERPL-MCNC: 3.4 MG/DL — SIGNIFICANT CHANGE UP (ref 2.5–4.5)
PLATELET # BLD AUTO: 271 K/UL — SIGNIFICANT CHANGE UP (ref 150–400)
PLATELET # BLD AUTO: 271 K/UL — SIGNIFICANT CHANGE UP (ref 150–400)
POTASSIUM SERPL-MCNC: 4.7 MMOL/L — SIGNIFICANT CHANGE UP (ref 3.5–5.3)
POTASSIUM SERPL-MCNC: 4.7 MMOL/L — SIGNIFICANT CHANGE UP (ref 3.5–5.3)
POTASSIUM SERPL-SCNC: 4.7 MMOL/L — SIGNIFICANT CHANGE UP (ref 3.5–5.3)
POTASSIUM SERPL-SCNC: 4.7 MMOL/L — SIGNIFICANT CHANGE UP (ref 3.5–5.3)
PROCALCITONIN SERPL-MCNC: 0.09 NG/ML — SIGNIFICANT CHANGE UP (ref 0.02–0.1)
PROCALCITONIN SERPL-MCNC: 0.09 NG/ML — SIGNIFICANT CHANGE UP (ref 0.02–0.1)
PROT ?TM UR-MCNC: <7 MG/DL — SIGNIFICANT CHANGE UP (ref 0–12)
PROT ?TM UR-MCNC: <7 MG/DL — SIGNIFICANT CHANGE UP (ref 0–12)
PROT SERPL-MCNC: 7.8 G/DL — SIGNIFICANT CHANGE UP (ref 6–8.3)
PROT SERPL-MCNC: 7.8 G/DL — SIGNIFICANT CHANGE UP (ref 6–8.3)
PROT UR-MCNC: NEGATIVE MG/DL — SIGNIFICANT CHANGE UP
PROT UR-MCNC: NEGATIVE MG/DL — SIGNIFICANT CHANGE UP
PROT/CREAT UR-RTO: <0.1 RATIO — SIGNIFICANT CHANGE UP (ref 0–0.2)
PROT/CREAT UR-RTO: <0.1 RATIO — SIGNIFICANT CHANGE UP (ref 0–0.2)
PROTHROM AB SERPL-ACNC: 12.2 SEC — SIGNIFICANT CHANGE UP (ref 9.5–13)
PROTHROM AB SERPL-ACNC: 12.2 SEC — SIGNIFICANT CHANGE UP (ref 9.5–13)
PROTHROM AB SERPL-ACNC: 12.4 SEC — SIGNIFICANT CHANGE UP (ref 9.5–13)
PROTHROM AB SERPL-ACNC: 12.4 SEC — SIGNIFICANT CHANGE UP (ref 9.5–13)
RBC # BLD: 3.92 M/UL — LOW (ref 4.2–5.8)
RBC # BLD: 3.92 M/UL — LOW (ref 4.2–5.8)
RBC # FLD: 14.2 % — SIGNIFICANT CHANGE UP (ref 10.3–14.5)
RBC # FLD: 14.2 % — SIGNIFICANT CHANGE UP (ref 10.3–14.5)
RBC CASTS # UR COMP ASSIST: 0 /HPF — SIGNIFICANT CHANGE UP (ref 0–4)
RBC CASTS # UR COMP ASSIST: 0 /HPF — SIGNIFICANT CHANGE UP (ref 0–4)
SODIUM SERPL-SCNC: 130 MMOL/L — LOW (ref 135–145)
SODIUM SERPL-SCNC: 130 MMOL/L — LOW (ref 135–145)
SP GR SPEC: 1.01 — SIGNIFICANT CHANGE UP (ref 1–1.03)
SP GR SPEC: 1.01 — SIGNIFICANT CHANGE UP (ref 1–1.03)
SPECIMEN SOURCE: SIGNIFICANT CHANGE UP
SPECIMEN SOURCE: SIGNIFICANT CHANGE UP
SQUAMOUS # UR AUTO: 0 /HPF — SIGNIFICANT CHANGE UP (ref 0–5)
SQUAMOUS # UR AUTO: 0 /HPF — SIGNIFICANT CHANGE UP (ref 0–5)
UFH PPP CHRO-ACNC: 0.35 IU/ML — SIGNIFICANT CHANGE UP (ref 0.3–0.7)
UFH PPP CHRO-ACNC: 0.35 IU/ML — SIGNIFICANT CHANGE UP (ref 0.3–0.7)
UROBILINOGEN FLD QL: 1 MG/DL — SIGNIFICANT CHANGE UP (ref 0.2–1)
UROBILINOGEN FLD QL: 1 MG/DL — SIGNIFICANT CHANGE UP (ref 0.2–1)
VANCOMYCIN FLD-MCNC: 14.1 UG/ML — SIGNIFICANT CHANGE UP
VANCOMYCIN FLD-MCNC: 14.1 UG/ML — SIGNIFICANT CHANGE UP
WBC # BLD: 11.15 K/UL — HIGH (ref 3.8–10.5)
WBC # BLD: 11.15 K/UL — HIGH (ref 3.8–10.5)
WBC # FLD AUTO: 11.15 K/UL — HIGH (ref 3.8–10.5)
WBC # FLD AUTO: 11.15 K/UL — HIGH (ref 3.8–10.5)
WBC UR QL: 1 /HPF — SIGNIFICANT CHANGE UP (ref 0–5)
WBC UR QL: 1 /HPF — SIGNIFICANT CHANGE UP (ref 0–5)

## 2023-11-21 PROCEDURE — 99232 SBSQ HOSP IP/OBS MODERATE 35: CPT

## 2023-11-21 PROCEDURE — 93308 TTE F-UP OR LMTD: CPT | Mod: 26

## 2023-11-21 PROCEDURE — 93976 VASCULAR STUDY: CPT | Mod: 26

## 2023-11-21 PROCEDURE — 99232 SBSQ HOSP IP/OBS MODERATE 35: CPT | Mod: GC

## 2023-11-21 PROCEDURE — 93010 ELECTROCARDIOGRAM REPORT: CPT

## 2023-11-21 PROCEDURE — 99233 SBSQ HOSP IP/OBS HIGH 50: CPT

## 2023-11-21 PROCEDURE — 93321 DOPPLER ECHO F-UP/LMTD STD: CPT | Mod: 26

## 2023-11-21 PROCEDURE — 93975 VASCULAR STUDY: CPT | Mod: 26

## 2023-11-21 PROCEDURE — 99291 CRITICAL CARE FIRST HOUR: CPT | Mod: 25

## 2023-11-21 PROCEDURE — 99292 CRITICAL CARE ADDL 30 MIN: CPT

## 2023-11-21 PROCEDURE — 71045 X-RAY EXAM CHEST 1 VIEW: CPT | Mod: 26

## 2023-11-21 RX ORDER — HYDROXYZINE HCL 10 MG
25 TABLET ORAL
Refills: 0 | Status: DISCONTINUED | OUTPATIENT
Start: 2023-11-21 | End: 2023-12-25

## 2023-11-21 RX ADMIN — Medication 2: at 17:07

## 2023-11-21 RX ADMIN — Medication 300 MILLIGRAM(S): at 14:18

## 2023-11-21 RX ADMIN — CARVEDILOL PHOSPHATE 25 MILLIGRAM(S): 80 CAPSULE, EXTENDED RELEASE ORAL at 17:08

## 2023-11-21 RX ADMIN — ATORVASTATIN CALCIUM 80 MILLIGRAM(S): 80 TABLET, FILM COATED ORAL at 21:32

## 2023-11-21 RX ADMIN — Medication 8 UNIT(S): at 17:07

## 2023-11-21 RX ADMIN — HEPARIN SODIUM 14.5 UNIT(S)/HR: 5000 INJECTION INTRAVENOUS; SUBCUTANEOUS at 21:00

## 2023-11-21 RX ADMIN — Medication 1: at 12:50

## 2023-11-21 RX ADMIN — Medication 8 UNIT(S): at 09:10

## 2023-11-21 RX ADMIN — CARVEDILOL PHOSPHATE 25 MILLIGRAM(S): 80 CAPSULE, EXTENDED RELEASE ORAL at 05:56

## 2023-11-21 RX ADMIN — ISOSORBIDE DINITRATE 40 MILLIGRAM(S): 5 TABLET ORAL at 12:40

## 2023-11-21 RX ADMIN — HEPARIN SODIUM 14 UNIT(S)/HR: 5000 INJECTION INTRAVENOUS; SUBCUTANEOUS at 05:55

## 2023-11-21 RX ADMIN — Medication 100 MILLIGRAM(S): at 14:17

## 2023-11-21 RX ADMIN — Medication 100 MILLIGRAM(S): at 21:32

## 2023-11-21 RX ADMIN — Medication 81 MILLIGRAM(S): at 12:40

## 2023-11-21 RX ADMIN — ISOSORBIDE DINITRATE 40 MILLIGRAM(S): 5 TABLET ORAL at 17:07

## 2023-11-21 RX ADMIN — Medication 8 UNIT(S): at 12:50

## 2023-11-21 RX ADMIN — INSULIN GLARGINE 12 UNIT(S): 100 INJECTION, SOLUTION SUBCUTANEOUS at 21:32

## 2023-11-21 RX ADMIN — ISOSORBIDE DINITRATE 40 MILLIGRAM(S): 5 TABLET ORAL at 05:56

## 2023-11-21 RX ADMIN — Medication 0.5 MILLIGRAM(S): at 05:55

## 2023-11-21 RX ADMIN — SPIRONOLACTONE 25 MILLIGRAM(S): 25 TABLET, FILM COATED ORAL at 05:56

## 2023-11-21 RX ADMIN — BUDESONIDE AND FORMOTEROL FUMARATE DIHYDRATE 2 PUFF(S): 160; 4.5 AEROSOL RESPIRATORY (INHALATION) at 08:22

## 2023-11-21 RX ADMIN — Medication 1: at 09:10

## 2023-11-21 RX ADMIN — AMIODARONE HYDROCHLORIDE 200 MILLIGRAM(S): 400 TABLET ORAL at 05:57

## 2023-11-21 RX ADMIN — Medication 100 MILLIGRAM(S): at 05:56

## 2023-11-21 NOTE — PROGRESS NOTE ADULT - SUBJECTIVE AND OBJECTIVE BOX
[facility] Division of Kidney Diseases & Hypertension  FOLLOW UP NOTE  [phone]--------------------------------------------------------------------------------  Chief Complaint:Non-ST elevation myocardial infarction (NSTEMI)    24 hour events/subjective:  No acute overnight events. No fresh complaints. Pt was seen at the bedside while getting 2D echo.   Remains on IABP. Scr stable. Denies any headaches, fevers/chills, chest pain, palpitations, SOB, and leg swelling.        PAST HISTORY  --------------------------------------------------------------------------------  No significant changes to PMH, PSH, FHx, SHx, unless otherwise noted    ALLERGIES & MEDICATIONS  --------------------------------------------------------------------------------  Allergies    penicillins (Unknown)    Intolerances      Standing Inpatient Medications  aMIOdarone    Tablet 200 milliGRAM(s) Oral daily  aMIOdarone    Tablet   Oral   aspirin  chewable 81 milliGRAM(s) Oral daily  atorvastatin 80 milliGRAM(s) Oral at bedtime  budesonide  80 MICROgram(s)/formoterol 4.5 MICROgram(s) Inhaler 2 Puff(s) Inhalation two times a day  carvedilol 25 milliGRAM(s) Oral every 12 hours  chlorhexidine 2% Cloths 1 Application(s) Topical daily  dextrose 50% Injectable 25 Gram(s) IV Push once  dextrose 50% Injectable 12.5 Gram(s) IV Push once  heparin  Infusion 1600 Unit(s)/Hr IV Continuous <Continuous>  hydrALAZINE 100 milliGRAM(s) Oral three times a day  insulin glargine Injectable (LANTUS) 12 Unit(s) SubCutaneous at bedtime  insulin lispro (ADMELOG) corrective regimen sliding scale   SubCutaneous three times a day before meals  insulin lispro (ADMELOG) corrective regimen sliding scale   SubCutaneous at bedtime  insulin lispro Injectable (ADMELOG) 8 Unit(s) SubCutaneous three times a day before meals  isosorbide   dinitrate Tablet (ISORDIL) 40 milliGRAM(s) Oral three times a day  polyethylene glycol 3350 17 Gram(s) Oral two times a day  senna 2 Tablet(s) Oral at bedtime  spironolactone 25 milliGRAM(s) Oral daily  vancomycin  IVPB 1500 milliGRAM(s) IV Intermittent every 24 hours    PRN Inpatient Medications      REVIEW OF SYSTEMS  --------------------------------------------------------------------------------  As above.      All other systems were reviewed and are negative, except as noted.    VITALS/PHYSICAL EXAM  --------------------------------------------------------------------------------  T(C): 36.7 (11-21-23 @ 04:00), Max: 37.1 (11-20-23 @ 14:30)  HR: 68 (11-21-23 @ 08:23) (68 - 77)  BP: --  RR: 11 (11-21-23 @ 07:00) (11 - 29)  SpO2: 98% (11-21-23 @ 08:23) (95% - 99%)  Wt(kg): --        11-20-23 @ 07:01  -  11-21-23 @ 07:00  --------------------------------------------------------  IN: 1704 mL / OUT: 1450 mL / NET: 254 mL      Physical Exam:  Gen: NAD  HEENT: MMM  Pulm: CTA B/L  CV: S1S2 +IABP  Abd: Soft, +BS   Ext: No LE edema B/L  Neuro: Awake  Skin: Warm and dry  Vascular access: Rt RAMYA cisse,     LABS/STUDIES  --------------------------------------------------------------------------------              11.4   11.15 >-----------<  271      [11-21-23 @ 03:50]              34.0     130  |  100  |  35  ----------------------------<  156      [11-21-23 @ 03:50]  4.7   |  17  |  1.61        Ca     10.0     [11-21-23 @ 03:50]      Mg     2.1     [11-21-23 @ 03:50]      Phos  3.4     [11-21-23 @ 03:50]    TPro  7.8  /  Alb  3.8  /  TBili  0.5  /  DBili  x   /  AST  22  /  ALT  47  /  AlkPhos  72  [11-21-23 @ 03:50]    PT/INR: PT 12.2 , INR 1.17       [11-21-23 @ 03:50]  PTT: 46.1       [11-21-23 @ 03:50]      Creatinine Trend:  SCr 1.61 [11-21 @ 03:50]  SCr 1.79 [11-20 @ 00:09]  SCr 1.65 [11-19 @ 02:54]  SCr 1.73 [11-18 @ 13:59]  SCr 1.84 [11-18 @ 00:24]    Urinalysis - [11-21-23 @ 03:50]      Color Yellow / Appearance Clear / SG 1.014 / pH 5.0      Gluc 250 / Ketone Negative  / Bili Negative / Urobili 1.0       Blood Negative / Protein Negative / Leuk Est Negative / Nitrite Negative      RBC 0 / WBC 1 / Hyaline  / Gran  / Sq Epi  / Non Sq Epi 0 / Bacteria Negative    Urine Creatinine 62      [11-21-23 @ 03:50]  Urine Protein <7      [11-21-23 @ 03:50]  Urine Sodium 88      [11-19-23 @ 04:24]  Urine Potassium 22      [11-19-23 @ 04:24]  Urine Phosphorus 39.3      [11-19-23 @ 04:24]  Urine Osmolality 431      [11-19-23 @ 04:24]        C3 Complement 171      [11-19-23 @ 02:54]  C4 Complement 45      [11-19-23 @ 02:54]  ANCA: cANCA Negative, pANCA Negative, atypical ANCA Negative      [11-19-23 @ 02:54]  Free Light Chains: kappa 3.76, lambda 3.25, ratio = 1.16      [11-19 @ 02:54]

## 2023-11-21 NOTE — PROGRESS NOTE ADULT - NS ATTEND AMEND GEN_ALL_CORE FT
Patient supported on IABP 1:1, no inotropes at this time  Abdominal ultrasound was performed, will get final clearance from hepatology  Appreciate transpant ID recs, awaiting the timing from the ID standpoint to activate patient on the list  IABP exchanged to other side due to bacteremia

## 2023-11-21 NOTE — PROGRESS NOTE ADULT - ASSESSMENT
59 yo M with PMHx of HTN, CAD w/ 1 stent in 2009, ICH (2008) presented on 11/1 with abn ekg. Patient presented to [facility] where he was found to have STEMI, recommended to get cath however patient did not want to get it there so he left and came here.   EKG here with ST depression in lateral leads and elevation in anterior leads   Prior to [facility] found to be tachycardic, dyspneic, intubated   [facility] 11/1 with chronic total occlusion of LAD and RCA, with elevated RA and PA pressures  TTE 11/1 with severely decreased EF 32%, s/p IABP 11/1    RVP (11/1) Positive for COVID19  RVP (11/10) Negative  BCx (11/2, 11/4) NGTD  ETT Culture (11/2) NGTD  MRSA/MSSA PCR (11/2, 11/10) Negative  UA (11/10) 1 WBC    CXR (11/10) Clear Lungs  TTE (11/2) Left ventricular thrombus.    #Enterococcus Bacteremia, Leukocytosis, Pre-Heart Transplant Evaluation Serologies  Concern for either central line or abdominal source  CT A/P Noncontrast (11/18) No acute process  Given mixed cultures with Staph epi and Enterococcus faecalis would continue Vancomycin   Patient's penicillin allergy is questionable and patient does not recall reaction.   Anticipate we can utilize Ampicillin in the future with close monitoring  -- removal/exchange of Balloon Pump done on 11/21  --Continue Vancomycin 1.5g IV Q24H. Check trough prior to third dose. Target trough 15-20  --Continue to monitor renal function and vancomycin levels to avoid nephrotoxicity / ototoxicity secondary to vancomycin level 11/21 therapeutic range  --Continue to follow CBC with diff  --Continue to follow temperature curve  --Follow up on susceptibility of Enterococcus faecalis shows Vanco and Ampi sensitive  --Follow up on repeat blood cultures from 11/18 are negative  -- TTE to look for vegetations unable to r/o endocarditis but no vegetations seen    Given evidence of clearing of bacteremia could move forward from an ID perspective to pursue advanced cardiac therapies      #Encounter to Vaccinate Patient  COVID19: COVID19 Infection This Admission. Would consider Vaccination in ~3 months  Influenza: declined  Pneumococcal: Would benefit from PCV20  HAV: recommend Havrix  HBV: recommend Heplisav  MMR: Not Mumps Immune, if >1 month until transplant would consider MMR dose  Varicella: Immune  Shingles: recommend Shingrix series  Tdap: recommend Tdap    Chao Peters MD  Can be called via Teams  After 5pm/weekends [phone]

## 2023-11-21 NOTE — PROGRESS NOTE ADULT - SUBJECTIVE AND OBJECTIVE BOX
ADVANCED HEART FAILURE & TRANSPLANT  - PROGRESS NOTE  *To reach the NS2 Team from 8am to 5pm (MON-FRI), please call [phone].   _______________________________________________________________________________________________________    Subjective:    Medications:  aMIOdarone    Tablet 200 milliGRAM(s) Oral daily  aMIOdarone    Tablet   Oral   aspirin  chewable 81 milliGRAM(s) Oral daily  atorvastatin 80 milliGRAM(s) Oral at bedtime  budesonide  80 MICROgram(s)/formoterol 4.5 MICROgram(s) Inhaler 2 Puff(s) Inhalation two times a day  carvedilol 25 milliGRAM(s) Oral every 12 hours  chlorhexidine 2% Cloths 1 Application(s) Topical daily  dextrose 50% Injectable 25 Gram(s) IV Push once  dextrose 50% Injectable 12.5 Gram(s) IV Push once  heparin  Infusion 1600 Unit(s)/Hr IV Continuous <Continuous>  hydrALAZINE 100 milliGRAM(s) Oral three times a day  insulin glargine Injectable (LANTUS) 12 Unit(s) SubCutaneous at bedtime  insulin lispro (ADMELOG) corrective regimen sliding scale   SubCutaneous three times a day before meals  insulin lispro (ADMELOG) corrective regimen sliding scale   SubCutaneous at bedtime  insulin lispro Injectable (ADMELOG) 8 Unit(s) SubCutaneous three times a day before meals  isosorbide   dinitrate Tablet (ISORDIL) 40 milliGRAM(s) Oral three times a day  polyethylene glycol 3350 17 Gram(s) Oral two times a day  senna 2 Tablet(s) Oral at bedtime  spironolactone 25 milliGRAM(s) Oral daily  vancomycin  IVPB 1500 milliGRAM(s) IV Intermittent every 24 hours      Physical Exam:    Vitals:  Vital Signs Last 24 Hours  T(C): 36.7 (23 @ 04:00), Max: 37.1 (23 @ 14:30)  HR: 74 (23 @ 07:00) (69 - 77)  BP: --  RR: 11 (23 @ 07:00) (11 - 29)  SpO2: 97% (23 @ 07:00) (95% - 99%)    Weight in k.7 ( @ 05:00)    I&O's Summary    2023 07:01  -  2023 07:00  --------------------------------------------------------  IN: 1704 mL / OUT: 1450 mL / NET: 254 mL        Tele:    General: No distress. Comfortable.  HEENT: EOM intact.  Neck: Neck supple. JVP not elevated. No masses  Chest: Clear to auscultation bilaterally  CV: Normal S1 and S2. No murmurs, rub, or gallops. Radial pulses normal.  Abdomen: Soft, non-distended, non-tender  Skin: No rashes or skin breakdown  Extremities: No LE edema  Neurology: Alert and oriented times three. Sensation intact  Psych: Affect normal    Labs:                        11.4   11.15 )-----------( 271      ( 2023 03:50 )             34.0         130<L>  |  100  |  35<H>  ----------------------------<  156<H>  4.7   |  17<L>  |  1.61<H>    Ca    10.0      2023 03:50  Phos  3.4       Mg     2.1         TPro  7.8  /  Alb  3.8  /  TBili  0.5  /  DBili  x   /  AST  22  /  ALT  47<H>  /  AlkPhos  72      PT/INR - ( 2023 03:50 )   PT: 12.2 sec;   INR: 1.17 ratio         PTT - ( 2023 03:50 )  PTT:46.1 sec            Lactate, Blood: 1.0 mmol/L ( @ 03:50)     ADVANCED HEART FAILURE & TRANSPLANT  - PROGRESS NOTE  *To reach the NS2 Team from 8am to 5pm (MON-FRI), please call [phone].   _______________________________________________________________________________________________________    Subjective:  - NAEO  - s/p IABP exchange today from RFA to LFA  - endorses CP and peripheral paresthesia' s to fingertips today    Medications:  aMIOdarone    Tablet 200 milliGRAM(s) Oral daily  aMIOdarone    Tablet   Oral   aspirin  chewable 81 milliGRAM(s) Oral daily  atorvastatin 80 milliGRAM(s) Oral at bedtime  budesonide  80 MICROgram(s)/formoterol 4.5 MICROgram(s) Inhaler 2 Puff(s) Inhalation two times a day  carvedilol 25 milliGRAM(s) Oral every 12 hours  chlorhexidine 2% Cloths 1 Application(s) Topical daily  dextrose 50% Injectable 25 Gram(s) IV Push once  dextrose 50% Injectable 12.5 Gram(s) IV Push once  heparin  Infusion 1600 Unit(s)/Hr IV Continuous <Continuous>  hydrALAZINE 100 milliGRAM(s) Oral three times a day  insulin glargine Injectable (LANTUS) 12 Unit(s) SubCutaneous at bedtime  insulin lispro (ADMELOG) corrective regimen sliding scale   SubCutaneous three times a day before meals  insulin lispro (ADMELOG) corrective regimen sliding scale   SubCutaneous at bedtime  insulin lispro Injectable (ADMELOG) 8 Unit(s) SubCutaneous three times a day before meals  isosorbide   dinitrate Tablet (ISORDIL) 40 milliGRAM(s) Oral three times a day  polyethylene glycol 3350 17 Gram(s) Oral two times a day  senna 2 Tablet(s) Oral at bedtime  spironolactone 25 milliGRAM(s) Oral daily  vancomycin  IVPB 1500 milliGRAM(s) IV Intermittent every 24 hours      Physical Exam:    Vitals:  Vital Signs Last 24 Hours  T(C): 36.7 (23 @ 04:00), Max: 37.1 (23 @ 14:30)  HR: 74 (23 @ 07:00) (69 - 77)  Aug mean; 82-91  RR: 11 (23 @ 07:00) (11 - 29)  SpO2: 97% (23 @ 07:00) (95% - 99%)    Weight in k.7 ( @ 05:00)    I&O's Summary    2023 07:01  -  2023 07:00  --------------------------------------------------------  IN: 1704 mL / OUT: 1450 mL / NET: 254 mL    Tele: SR 70's    General: No distress. Comfortable.  HEENT: EOM intact.  Neck: Neck supple. JVP not elevated. No masses  Chest: Clear to auscultation bilaterally  CV: Normal S1 and S2. No murmurs, rub, or gallops. Radial pulses normal, warm peripherally  Abdomen: Soft, non-distended, non-tender  Skin: No rashes or skin breakdown  Extremities: No LE edema  Neurology: Alert and oriented times three. Sensation intact  Psych: Affect normal    Labs:                        11.4   11.15 )-----------( 271      ( 2023 03:50 )             34.0         130<L>  |  100  |  35<H>  ----------------------------<  156<H>  4.7   |  17<L>  |  1.61<H>    Ca    10.0      2023 03:50  Phos  3.4       Mg     2.1         TPro  7.8  /  Alb  3.8  /  TBili  0.5  /  DBili  x   /  AST  22  /  ALT  47<H>  /  AlkPhos  72      PT/INR - ( 2023 03:50 )   PT: 12.2 sec;   INR: 1.17 ratio    PTT - ( 2023 03:50 )  PTT:46.1 sec  Lactate, Blood: 1.0 mmol/L ( @ 03:50)

## 2023-11-21 NOTE — PROGRESS NOTE ADULT - ATTENDING COMMENTS
57 yo man ARIC  Hx HFrEF (EF 32%), CAD s/p PCI (2008) DMT2 (A1c 8.3%) and CVA   Recently treated for PNA   Now syncope reportedly requiring defibrillation. Treated for ACS   Pt has covid and was placed on IABP for hypotension.   Being evaluated for Heart transplant - LVAD placement.  Renal function stable- will optimize  Scr stable again- lytes good   Breathing RA now- O2 sat ok

## 2023-11-21 NOTE — PROGRESS NOTE ADULT - ASSESSMENT
====================ASSESSMENT ==============  58 male with HTN, CAD (s/p PCI 2008), HFrEF, CVA 2018, and T2DM presenting with chest pressure and unknown tachycardia that was shocked x1, [facility] 11/1 found to have in-stent restenosis of pLAD and  of RCA with elevated RA and PA pressures and severely decreased EF 32%, admitted to CICU for management of cardiogenic shock requiring IABP 11/1 -11/7, with hospital course c/b vfib arrest requiring reinsertion of IABP. Currently undergoing workup for AT evaluation with HF.    Plan:  ====================== NEUROLOGY=====================  # Anxiety  -   - No Seroquel/antipsychotics since vfib arrest and prolonged QTC  - Psych Eval last week, recs for continuing Ativan PRN and had recommended SSRI, but pt. refused   - Needs psych eval for tx     # Pain 2/2 rib fractures post CPR  - c/w multimodal pain control w/ tylenol as needed    ==================== RESPIRATORY======================  # Acute Hypoxemic Respiratory Failure  - s/p x2 intubations for cardiogenic pulm edema and the in setting of cardiac arrest, resolved - extubated 11/10  - Currently on room air with spO2 mid 90s  - Continue incentive spirometry and monitoring of sp02    # Asthma  - c/w albuterol, symbicort and spiriva  - on trelegy at home    ====================CARDIOVASCULAR===================  # Vfib arrest i/s/o ischemia  - Lido gtt off since 11/13  - PO Amio load dose (s/p IV amio ~1950mg IV gtt) total of 5 grams per EP complete 11/17, continue PO amio  - Keep K > 4, Mag > 2.2     # Cardiogenic shock requiring IABP (11/1- 11/7, 11/9-)  - Likely 2/2 NSTEMI and ADHF  - 11/1 LHC: pLAD 100 % in-stent restenosis & mRCA, 100 %. PCWP 30. IABP placed.  - 11/1 TTE: LV dilated. EF 32 %. Regional WMAs present, mod (grade 2) LV diastolic dysfunction  - 11/2 TTE: EF 22% and + LV thrombus  - PN Bumex IVP as needed  - IABP swapped 11/20 to RFA, continue 1:1 support  - Off Milrinone gtt @ 7:30 am 11/11  - Currently on hydralazine 100 TID and ISD 40 TID for AL reduction  - Continue romel 25 daily and coreg 25 BID for GDMT  - AT Eval, F/u HF recs    # NSTEMI iso stent re-occlusion of pLAD and 100%  of RCA  - EKG on admission w/LBBB  - DAPT: c/w ASA, Brilinta d/c'd per transplant w/u  - c/w lipitor 80  - cMR deferred given necessity of IABP  - CT sx not recommending CABG, undergoing AT eval    # LV thrombus  - c/w heparin gtt    ===================HEMATOLOGIC/ONC ===================  # VTE prophylaxis   - c/w heparin gtt given LV Thrombus and IABP   - H/H and platelets stable  - Continue to monitor daily    ===================== RENAL =========================  # Non-oliguric ARIC   - sCr stable, Baseline Cr: 1-1.22  - Diuresis held, optimize pre-renal factors for optimal perfusion and volume status  - Trend BMP, lytes daily, replace as needed  - Continue Strict I/Os, avoid nephrotoxins    ==================== GASTROINTESTINAL===================  DASH diet  - Continue to monitor for BM    =======================    ENDOCRINE  =====================  # Type 2 DM  - A1c 8.3, glucose controlled  - Continue lantus, premeal, low ISS    ========================INFECTIOUS DISEASE================  # Enterococcus faecalis bacteremia  - Concern for aspiration event prior to intubation on admission - s/p vanco and cefepime course  - BCx 11/10 NGTD, RVP neg, MRSA neg, UA neg  - BCx 11/17+ for enterococcus, started on Vanco (by level) 11/18  - BCx 11/18 NGTD, urine cx + enterococcus  - IABP site swapped to LRFA 11/20  - continue vancomycin by level (11/18-  - CT A/P negative for infectious pathology    # COVID, resolved  - Off airborne precautions 11/11    # Pre-transplant ID w/u   - Trend ID recs for serologies   - Colonoscopy 11/17 - normal   - Chest CT 11/17 - improved LLL aeration  - f/u abdominal US    LINES:   RIJ [place] 11/9-11/14, RAx [place] 11/9-11/18, LFA IABP 11/9 -11/20, RFA IABP 11/20-    Ethics/Dispo: full code, maintain in CICU     ====================ASSESSMENT ==============  58 male with HTN, CAD (s/p PCI 2008), HFrEF, CVA 2018, and T2DM presenting with chest pressure and unknown tachycardia that was shocked x1, [facility] 11/1 found to have in-stent restenosis of pLAD and  of RCA with elevated RA and PA pressures and severely decreased EF 32%, admitted to CICU for management of cardiogenic shock requiring IABP 11/1 -11/7, with hospital course c/b vfib arrest requiring reinsertion of IABP. Currently undergoing workup for AT evaluation with HF.    Plan:  ====================== NEUROLOGY=====================  # Anxiety  - No Seroquel/antipsychotics since vfib arrest and prolonged QTC  - Psych Eval last week, recs for continuing Ativan PRN and had recommended SSRI, but pt. refused   - psych recommnding atarax PRN  - continue to monitor mental status  - continue band exercised while on bedrest s/t IABP    ==================== RESPIRATORY======================  # Acute Hypoxemic Respiratory Failure  - s/p x2 intubations for cardiogenic pulm edema and the in setting of cardiac arrest, resolved - extubated 11/10  - Currently on room air with spO2 mid 90s  - Continue incentive spirometry and monitoring of sp02    # Asthma  - c/w albuterol, symbicort and spiriva  - on trelegy at home    ====================CARDIOVASCULAR===================  # Vfib arrest i/s/o ischemia  - Lido gtt off since 11/13  - PO Amio load dose (s/p IV amio ~1950mg IV gtt) total of 5 grams per EP complete 11/17, continue PO amio  - Keep K > 4, Mag > 2.2     # Cardiogenic shock requiring IABP (11/1- 11/7, 11/9-)  - Likely 2/2 NSTEMI and ADHF  - 11/1 LHC: pLAD 100 % in-stent restenosis & mRCA, 100 %. PCWP 30. IABP placed.  - 11/1 TTE: LV dilated. EF 32 %. Regional WMAs present, mod (grade 2) LV diastolic dysfunction  - 11/2 TTE: EF 22% and + LV thrombus  - PRN Bumex IVP as needed, holding diuresis today  - IABP swapped 11/20 to RFA, continue 1:1 support  - Off Milrinone gtt @ 7:30 am 11/11  - Currently on hydralazine 100 TID and ISD 40 TID for AL reduction  - Continue romel 25 daily and coreg 25 BID for GDMT  - AT Eval, F/u HF recs    # NSTEMI iso stent re-occlusion of pLAD and 100%  of RCA  - EKG on admission w/LBBB  - DAPT: c/w ASA, Brilinta d/c'd per transplant w/u  - c/w lipitor 80  - cMR deferred given necessity of IABP  - CT sx not recommending CABG, undergoing AT eval    # LV thrombus  - c/w heparin gtt    ===================HEMATOLOGIC/ONC ===================  # VTE prophylaxis   - c/w heparin gtt given LV Thrombus and IABP   - H/H and platelets stable  - Continue to monitor daily    ===================== RENAL =========================  # Non-oliguric ARIC   - sCr stable, Baseline Cr: 1-1.22  - Diuresis held, optimize pre-renal factors for optimal perfusion and volume status  - f/u renal US  - Trend BMP, lytes daily, replace as needed  - Continue Strict I/Os, avoid nephrotoxins    ==================== GASTROINTESTINAL===================  DASH diet  - Continue to monitor for BM    =======================    ENDOCRINE  =====================  # Type 2 DM  - A1c 8.3, glucose controlled  - Continue lantus, premeal, low ISS    ========================INFECTIOUS DISEASE================  # Enterococcus faecalis bacteremia  - Concern for aspiration event prior to intubation on admission - s/p vanco and cefepime course  - BCx 11/10 NGTD, RVP neg, MRSA neg, UA neg  - BCx 11/17+ for enterococcus, started on Vanco (by level) 11/18  - BCx 11/18 NGTD, urine cx + enterococcus  - IABP site swapped to LRFA 11/20  - continue vancomycin by level (11/18-  - CT A/P negative for infectious pathology  - f/u ID recomendations    # COVID, resolved  - Off airborne precautions 11/11    # Pre-transplant ID w/u   - Trend ID recs for serologies   - Colonoscopy 11/17 - normal   - Chest CT 11/17 - improved LLL aeration  - f/u abdominal US    LINES:   RIJ [place] 11/9-11/14, RAroberto [place] 11/9-11/18, LFA IABP 11/9 -11/20, RFA IABP 11/20-    Ethics/Dispo: full code, maintain in CICU

## 2023-11-21 NOTE — PROGRESS NOTE ADULT - SUBJECTIVE AND OBJECTIVE BOX
INFECTIOUS DISEASES FOLLOW UP-- Courtney Peters  [phone]    This is a follow up note for this  58yMale with  Non-ST elevation myocardial infarction (NSTEMI)  ID following for enterococcal bacteremia  IABP exchanged to opposite side on         ROS:  CONSTITUTIONAL:  No fever, good appetite  CARDIOVASCULAR:  No chest pain or palpitations  RESPIRATORY:  No dyspnea  GASTROINTESTINAL:  No nausea, vomiting, diarrhea, or abdominal pain  GENITOURINARY:  No dysuria  NEUROLOGIC:  No headache,     Allergies    penicillins (Unknown)    Intolerances        ANTIBIOTICS/RELEVANT:  antimicrobials  vancomycin  IVPB 1500 milliGRAM(s) IV Intermittent every 24 hours    immunologic:    OTHER:  aMIOdarone    Tablet 200 milliGRAM(s) Oral daily  aMIOdarone    Tablet   Oral   aspirin  chewable 81 milliGRAM(s) Oral daily  atorvastatin 80 milliGRAM(s) Oral at bedtime  budesonide  80 MICROgram(s)/formoterol 4.5 MICROgram(s) Inhaler 2 Puff(s) Inhalation two times a day  carvedilol 25 milliGRAM(s) Oral every 12 hours  chlorhexidine 2% Cloths 1 Application(s) Topical daily  dextrose 50% Injectable 12.5 Gram(s) IV Push once  dextrose 50% Injectable 25 Gram(s) IV Push once  heparin  Infusion 1600 Unit(s)/Hr IV Continuous <Continuous>  hydrALAZINE 100 milliGRAM(s) Oral three times a day  hydrOXYzine hydrochloride 25 milliGRAM(s) Oral two times a day PRN  insulin glargine Injectable (LANTUS) 12 Unit(s) SubCutaneous at bedtime  insulin lispro (ADMELOG) corrective regimen sliding scale   SubCutaneous three times a day before meals  insulin lispro (ADMELOG) corrective regimen sliding scale   SubCutaneous at bedtime  insulin lispro Injectable (ADMELOG) 8 Unit(s) SubCutaneous three times a day before meals  isosorbide   dinitrate Tablet (ISORDIL) 40 milliGRAM(s) Oral three times a day  polyethylene glycol 3350 17 Gram(s) Oral two times a day  senna 2 Tablet(s) Oral at bedtime  spironolactone 25 milliGRAM(s) Oral daily      Objective:  Vital Signs Last 24 Hrs  T(C): 36.8 (2023 15:00), Max: 36.9 (2023 00:00)  T(F): 98.3 (2023 15:00), Max: 98.4 (2023 00:00)  HR: 79 (2023 16:00) (68 - 79)  BP: --  BP(mean): --  RR: 33 (2023 16:00) (11 - 57)  SpO2: 100% (2023 16:00) (97% - 100%)    Parameters below as of 2023 16:00  Patient On (Oxygen Delivery Method): room air        PHYSICAL EXAM:  Constitutional:no acute distress  Eyes:MARVEL, EOMI  Ear/Nose/Throat: no oral lesions, 	  Respiratory: clear BL  Cardiovascular: S1S2  Gastrointestinal:soft, (+) BS, no tenderness  Extremities:no e/e/c right IABP  No Lymphadenopathy  IV sites not inflammed.    LABS:                        11.4   11.15 )-----------( 271      ( 2023 03:50 )             34.0     11-21    130<L>  |  100  |  35<H>  ----------------------------<  156<H>  4.7   |  17<L>  |  1.61<H>    Ca    10.0      2023 03:50  Phos  3.4     11-  Mg     2.1     -    TPro  7.8  /  Alb  3.8  /  TBili  0.5  /  DBili  x   /  AST  22  /  ALT  47<H>  /  AlkPhos  72  11-21    PT/INR - ( 2023 03:50 )   PT: 12.2 sec;   INR: 1.17 ratio         PTT - ( 2023 13:17 )  PTT:57.3 sec  Urinalysis Basic - ( 2023 03:50 )    Color: Yellow / Appearance: Clear / S.014 / pH: x  Gluc: 156 mg/dL / Ketone: Negative mg/dL  / Bili: Negative / Urobili: 1.0 mg/dL   Blood: x / Protein: Negative mg/dL / Nitrite: Negative   Leuk Esterase: Negative / RBC: 0 /HPF / WBC 1 /HPF   Sq Epi: x / Non Sq Epi: 0 /HPF / Bacteria: Negative /HPF        MICROBIOLOGY:    Vancomycin Level, Random: 14.1: The "VANCOMYCIN, RANDOM" test should only be ordered for patients with  severe renal dysfunction or on dialysis. Therapeutic ranges have not been  established and results must be interpreted in the context of patient's  clinical condition.  NOTE: Therapeutic range for Trough Vancomycin is 10-20 mcg/mL. ug/mL (23 @ 03:50)            RECENT CULTURES:   @ 13:40  .Blood Blood-Peripheral  --  --  --    No growth at 48 Hours  --   @ 13:30  .Blood Blood-Peripheral  --  --  --    No growth at 48 Hours  --   @ 12:13  Clean Catch Clean Catch (Midstream)  --  --  --    10,000 - 49,000 CFU/mL Enterococcus faecalis  <10,000 CFU/ml Normal Urogenital kaitlynn present  --   @ 18:45  .Blood Blood  --  --  --    Growth in aerobic and anaerobic bottles: Enterococcus faecalis  See previous culture [id number]  Growth in aerobic bottle: Staphylococcus epidermidis Susceptibility to  follow.  --   @ 18:37  .Blood Blood  Blood Culture PCR  Blood Culture PCR  Enterococcus faecalis  Blood Culture PCR  PCR    Growth in aerobic bottle: Enterococcus faecalis  Growth in anaerobic bottle: Staphylococcus epidermidis "Susceptibilities  not performed"  Direct identification is available within approximately 3-5  hours either by Blood Panel Multiplexed PCR or Direct  MALDI-TOF. Details: [email or web address]  --      RADIOLOGY & ADDITIONAL STUDIES:    < from: US Abdomen Doppler (23 @ 07:44) >  Impression: Patent portal venous system with hepatopedal blood flow. No   portal venous thrombosis.    Patent hepatic veins.    Patent hepatic artery and branches.    < end of copied text >

## 2023-11-21 NOTE — PROGRESS NOTE ADULT - ASSESSMENT
59 yo M with HFrEF (LVIDd 6.4 cm, LVEF 32%), CAD s/p PCI (2008), HTN, DMT2 (A1c 8.3%) and CVA s/p TPA (2018), recently treated for PNA who initially presented to [facility] via EMS after syncope reportedly requiring defibrilation. Treated for ACS but left AMA to come to [facility]. On arrival ECG with ST depression in lateral leads. Intubated 11/1 for respiratory failure. Found to have COVID. L/RHC 11/1revealing  of LAD and RCA, elevated filling pressures and CI of 1.5 prompting placement of IABP. Extubated 11/3. IABP was weaned to off 11/7, then the following day on 11/8, developed PMVT cardiac arrest with prompt CPR and defibrillation, started on IV Lidocaine and Amio. IABP ultimately replaced on 11/9 for worsening hypotension. TTE 11/11 revealing LV thrombus.     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 AT evaluation launched 11/10, he is ABO A. Currently well supported on IABP at 1:1 without further arrhythmias.  He was discussed during TSC on 11/16 and was approved for listing PENDING hepatology clearance, colonoscopy and repeat chest CT to assess PNA. He developed leukocytosis and worsening kidney function and was found to be bacteremic with GPC in pairs and chains on 11/17. Repeat cultures from 11/18 reveal NGTD. He's currently afebrile with downtrending leukocytosis on vanc. He is hemodynamically stable with elevated MAPs in 80-90s on IABP 1:1 on high dose oral vaosdilators/GDMT. His Cr is downtrending. He appears euvolemic on exam with net negative fluid balance over the past 24 hours off diuretics.     Cardiac Studies  11/1123 TTE: LVEF 22% (global), LV thrombus, normal RV size and function, mild MR, trace TR  11/1/23  LHC showed pLAD 70 % stenosis in the ostium portion of the segment and 100 % in-stent restenosis and in mRCA, 100 % stenosis.   11/1/23 RHC; RA 23 Vw 24, PA 52/33/40, PCWP 30 Vw 33, AO 85/66/73, PA sat 54.4%, CO/CI (F) 2.96/1.44, IABP was placed during the cath through R common femoral artery.   11/1/23 TTE: LVIDd 6.4cm , LVEF 32%, regional WMA, grade II DD,  TAPSE 1.7cm, mld MR, trace TR,     Bedside hemodynamics  11/3: IABP 1:1 RA 5; PA 27/12/18; CO/CI 5.6/2.6; MAP 90-100s.  11/4/23 IABP 1:3 CVP 4 PA 37/18 SvO2 83.8 CO/CI 11.2 CI 5.1  (in the setting of fever to 38.3C)  11/5 IABP 1:1 CVP 3 PA 48/27 SvO2 78.4% CO 8.2 CI 3.7   11/1 (IABP 1:1): CVP 1, PA 33/5/16, MvO2 74.3%

## 2023-11-21 NOTE — PROGRESS NOTE ADULT - SUBJECTIVE AND OBJECTIVE BOX
LD VALENCIA  58y Male  MRN:[id number]    Patient is a 58y old  Male who presents with a chief complaint of NSTEMI (01 Nov 2023 20:29)    HPI:  59yo M w/ hx HTN, CAD w/ 1 stent in 2009, ICH (2008) presenting with abn ekg. Patient presented to [facility] where he was found to have STEMI, recommended to get cath however patient did not want to get it there so it left and came here.  Patient initially had cough, congestion, fever, was placed on antibiotics on Sunday.  Started feeling nauseous and had a presyncopal event after which he presented to ED last night.  Had chest pain as well.  Chest pain is midsternal.  Not currently having chest pain.  Received 4 aspirin 30 min pta. (01 Nov 2023 15:11)      Patient seen and evaluated at bedside in CCU. interval events noted    Interval HPI: remain in ccu. IABP in place        PAST MEDICAL & SURGICAL HISTORY:  HTN (hypertension)      CAD (coronary artery disease)  2009; stent      Intracranial hemorrhage  2008      Respiratory arrest  december 1st      Myocardial infarction, unspecified MI type, unspecified artery      History of coronary artery stent placement          REVIEW OF SYSTEMS:  as per hpi     VITALS:   ICU Vital Signs Last 24 Hrs  T(C): 36.7 (21 Nov 2023 07:00), Max: 37.1 (20 Nov 2023 14:30)  T(F): 98.1 (21 Nov 2023 07:00), Max: 98.8 (20 Nov 2023 14:30)  HR: 77 (21 Nov 2023 09:00) (68 - 77)  BP: --  BP(mean): --  ABP: --  ABP(mean): --  RR: 57 (21 Nov 2023 09:00) (11 - 57)  SpO2: 98% (21 Nov 2023 09:00) (96% - 99%)    O2 Parameters below as of 21 Nov 2023 09:00  Patient On (Oxygen Delivery Method): room air              PHYSICAL EXAM:  GENERAL: NAD, comfortable   HEAD:  Atraumatic, Normocephalic  EYES: EOMI, PERRLA, conjunctiva and sclera clear  NECK: Supple, No JVD   CHEST/LUNG: Clear to auscultation bilaterally; No wheeze  HEART: Regular rate and rhythm; No murmurs, rubs, or gallops  ABDOMEN: Soft, Nontender, Nondistended; Bowel sounds present  EXTREMITIES:  2+ Peripheral Pulses, No clubbing, cyanosis, or edema  NEUROLOGY: nonfocal  SKIN: No rashes or lesions  +iabp    Consultant(s) Notes Reviewed:  [x ] YES  [ ] NO  Care Discussed with Consultants/Other Providers [ x] YES  [ ] NO    MEDS:   MEDICATIONS  (STANDING):  aMIOdarone    Tablet   Oral   aMIOdarone    Tablet 200 milliGRAM(s) Oral daily  aspirin  chewable 81 milliGRAM(s) Oral daily  atorvastatin 80 milliGRAM(s) Oral at bedtime  budesonide  80 MICROgram(s)/formoterol 4.5 MICROgram(s) Inhaler 2 Puff(s) Inhalation two times a day  carvedilol 25 milliGRAM(s) Oral every 12 hours  chlorhexidine 2% Cloths 1 Application(s) Topical daily  dextrose 50% Injectable 25 Gram(s) IV Push once  dextrose 50% Injectable 12.5 Gram(s) IV Push once  heparin  Infusion 1600 Unit(s)/Hr (14 mL/Hr) IV Continuous <Continuous>  hydrALAZINE 100 milliGRAM(s) Oral three times a day  insulin glargine Injectable (LANTUS) 12 Unit(s) SubCutaneous at bedtime  insulin lispro (ADMELOG) corrective regimen sliding scale   SubCutaneous at bedtime  insulin lispro (ADMELOG) corrective regimen sliding scale   SubCutaneous three times a day before meals  insulin lispro Injectable (ADMELOG) 8 Unit(s) SubCutaneous three times a day before meals  isosorbide   dinitrate Tablet (ISORDIL) 40 milliGRAM(s) Oral three times a day  polyethylene glycol 3350 17 Gram(s) Oral two times a day  senna 2 Tablet(s) Oral at bedtime  spironolactone 25 milliGRAM(s) Oral daily  vancomycin  IVPB 1500 milliGRAM(s) IV Intermittent every 24 hours    MEDICATIONS  (PRN):  hydrOXYzine hydrochloride 25 milliGRAM(s) Oral two times a day PRN Anxiety        ALLERGIES:  penicillins (Unknown)      LABS:                                                                                   11.4   11.15 )-----------( 271      ( 21 Nov 2023 03:50 )             34.0   11-21    130<L>  |  100  |  35<H>  ----------------------------<  156<H>  4.7   |  17<L>  |  1.61<H>    Ca    10.0      21 Nov 2023 03:50  Phos  3.4     11-21  Mg     2.1     11-21    TPro  7.8  /  Alb  3.8  /  TBili  0.5  /  DBili  x   /  AST  22  /  ALT  47<H>  /  AlkPhos  72  11-21      < from: Colonoscopy (11.17.23 @ 10:35) >                                                                                                        Impression:          - The entire examined colon is normal on direct and retroflexion views.                       - No specimens collected.  Recommendation:      - Resume previous diet today.                       - No large polyps or masses detected, No objection from GI standpoint to                 proceed with heart transplantation/advanced heart therapies.                       - Please call back should any further questions or concerns arise.    < end of copied text >     < from: Xray Chest 1 View- PORTABLE-Urgent (11.01.23 @ 07:42) >    IMPRESSION:  Clear lungs.    ---End of Report ---        < end of copied text >  < from: TTE W or WO Ultrasound Enhancing Agent (11.01.23 @ 10:23) >  _____________________________     CONCLUSIONS:      1. Left ventricular cavity is moderately dilated. Left ventricular wall thickness is normal. Left ventricular systolic function is severely decreased with an ejection fraction of 32 % by Chinchilla's method of disks. Regional wall motion abnormalities present.   2. Multiple segmental abnormalities exist. See findings.   3. There is moderate (grade 2) left ventricular diastolic dysfunction, with indeterminant filling pressure.   4. Normal right ventricular cavity size, wall thickness, and systolic function.   5. No significant valvular disease.   6. No pericardial effusion seen.   7. Compared to the transthoracic echocardiogram performed on 1/25/2017 the areas of akinesis are unchanged but there has been a decline in LV systolic function with new areas of hypokinesis.    __________________________________________________________________    < end of copied text >  < from: TTE Limited W or WO Ultrasound Enhancing Agent (11.02.23 @ 07:41) >  __________________________     CONCLUSIONS:      1. After obtaining consent, Definity ultrasound enhancing agent was given for enhanced left ventricular opacification and improved delineation of the left ventricular endocardial borders. Left ventricular systolic function is severely decreased with a calculated ejection fraction of 22 % by the Chinchilla's biplane method of disks. There is a left ventricular thrombus.   2. Findings were discussed with Litzy BOSS on 11/2/2023 at 8.49am.   3. There is a left ventricular thrombus.    _________________________________________________________________    < end of copied text >

## 2023-11-21 NOTE — PROGRESS NOTE ADULT - SUBJECTIVE AND OBJECTIVE BOX
PATIENT:  DEVORAH VALENCIA  [id number]    CHIEF COMPLAINT:  Patient is a 58y old  Male who presents with a chief complaint of Cardiogenic shock (2023 21:04)      INTERVAL HISTORYOVERNIGHT EVENTS:      REVIEW OF SYSTEMS:    Constitutional:     [ ] negative [ ] fevers [ ] chills [ ] weight loss [ ] weight gain  HEENT:                  [ ] negative [ ] dry eyes [ ] eye irritation [ ] postnasal drip [ ] nasal congestion  CV:                         [ ] negative  [ ] chest pain [ ] orthopnea [ ] palpitations [ ] murmur  Resp:                     [ ] negative [ ] cough [ ] shortness of breath [ ] dyspnea [ ] wheezing [ ] sputum [ ] hemoptysis  GI:                          [ ] negative [ ] nausea [ ] vomiting [ ] diarrhea [ ] constipation [ ] abd pain [ ] dysphagia   :                        [ ] negative [ ] dysuria [ ] nocturia [ ] hematuria [ ] increased urinary frequency  Musculoskeletal: [ ] negative [ ] back pain [ ] myalgias [ ] arthralgias [ ] fracture  Skin:                       [ ] negative [ ] rash [ ] itch  Neurological:        [ ] negative [ ] headache [ ] dizziness [ ] syncope [ ] weakness [ ] numbness  Psychiatric:           [ ] negative [ ] anxiety [ ] depression  Endocrine:            [ ] negative [ ] diabetes [ ] thyroid problem  Heme/Lymph:      [ ] negative [ ] anemia [ ] bleeding problem  Allergic/Immune: [ ] negative [ ] itchy eyes [ ] nasal discharge [ ] hives [ ] angioedema    [ ] All other systems negative  [ ] Unable to assess ROS because ________.    MEDICATIONS:  MEDICATIONS  (STANDING):  aMIOdarone    Tablet   Oral   aMIOdarone    Tablet 200 milliGRAM(s) Oral daily  aspirin  chewable 81 milliGRAM(s) Oral daily  atorvastatin 80 milliGRAM(s) Oral at bedtime  budesonide  80 MICROgram(s)/formoterol 4.5 MICROgram(s) Inhaler 2 Puff(s) Inhalation two times a day  carvedilol 25 milliGRAM(s) Oral every 12 hours  chlorhexidine 2% Cloths 1 Application(s) Topical daily  dextrose 50% Injectable 25 Gram(s) IV Push once  dextrose 50% Injectable 12.5 Gram(s) IV Push once  heparin  Infusion 1600 Unit(s)/Hr (14 mL/Hr) IV Continuous <Continuous>  hydrALAZINE 100 milliGRAM(s) Oral three times a day  insulin glargine Injectable (LANTUS) 12 Unit(s) SubCutaneous at bedtime  insulin lispro (ADMELOG) corrective regimen sliding scale   SubCutaneous at bedtime  insulin lispro (ADMELOG) corrective regimen sliding scale   SubCutaneous three times a day before meals  insulin lispro Injectable (ADMELOG) 8 Unit(s) SubCutaneous three times a day before meals  isosorbide   dinitrate Tablet (ISORDIL) 40 milliGRAM(s) Oral three times a day  polyethylene glycol 3350 17 Gram(s) Oral two times a day  senna 2 Tablet(s) Oral at bedtime  spironolactone 25 milliGRAM(s) Oral daily  vancomycin  IVPB 1500 milliGRAM(s) IV Intermittent every 24 hours    MEDICATIONS  (PRN):      ALLERGIES:  Allergies    penicillins (Unknown)    Intolerances        OBJECTIVE:  ICU Vital Signs Last 24 Hrs  T(C): 36.7 (2023 04:00), Max: 37.1 (2023 14:30)  T(F): 98.1 (2023 04:00), Max: 98.8 (2023 14:30)  HR: 74 (2023 07:00) (69 - 77)  BP: 104/65 (2023 08:00) (104/65 - 104/65)  BP(mean): 77 (2023 08:00) (77 - 77)  ABP: --  ABP(mean): --  RR: 11 (2023 07:00) (11 - 29)  SpO2: 97% (2023 07:00) (95% - 99%)    O2 Parameters below as of 2023 04:00  Patient On (Oxygen Delivery Method): room air            Adult Advanced Hemodynamics Last 24 Hrs  CVP(mm Hg): --  CVP(cm H2O): --  CO: --  CI: --  PA: --  PA(mean): --  PCWP: --  SVR: --  SVRI: --  PVR: --  PVRI: --  CAPILLARY BLOOD GLUCOSE      POCT Blood Glucose.: 243 mg/dL (2023 22:04)  POCT Blood Glucose.: 166 mg/dL (2023 20:02)  POCT Blood Glucose.: 130 mg/dL (2023 17:47)  POCT Blood Glucose.: 106 mg/dL (2023 11:34)  POCT Blood Glucose.: 195 mg/dL (2023 07:51)    CAPILLARY BLOOD GLUCOSE      POCT Blood Glucose.: 243 mg/dL (2023 22:04)    I&O's Summary    2023 07:01  -  2023 07:00  --------------------------------------------------------  IN: 1704 mL / OUT: 1450 mL / NET: 254 mL      Daily     Daily Weight in k.7 (2023 05:00)    PHYSICAL EXAMINATION:  General: WN/WD NAD  HEENT: PERRLA, EOMI, moist mucous membranes  Neurology: A&Ox3, nonfocal, MOISE x 4  Respiratory: CTA B/L, normal respiratory effort, no wheezes, crackles, rales  CV: RRR, S1S2, no murmurs, rubs or gallops  Abdominal: Soft, NT, ND +BS, Last BM  Extremities: No edema, + peripheral pulses  Incisions:   Tubes:    LABS:                          11.4   11.15 )-----------( 271      ( 2023 03:50 )             34.0     11-    130<L>  |  100  |  35<H>  ----------------------------<  156<H>  4.7   |  17<L>  |  1.61<H>    Ca    10.0      2023 03:50  Phos  3.4     11-  Mg     2.1     -    TPro  7.8  /  Alb  3.8  /  TBili  0.5  /  DBili  x   /  AST  22  /  ALT  47<H>  /  AlkPhos  72  11-    LIVER FUNCTIONS - ( 2023 03:50 )  Alb: 3.8 g/dL / Pro: 7.8 g/dL / ALK PHOS: 72 U/L / ALT: 47 U/L / AST: 22 U/L / GGT: x           PT/INR - ( 2023 03:50 )   PT: 12.2 sec;   INR: 1.17 ratio         PTT - ( 2023 03:50 )  PTT:46.1 sec        Urinalysis Basic - ( 2023 03:50 )    Color: Yellow / Appearance: Clear / S.014 / pH: x  Gluc: 156 mg/dL / Ketone: Negative mg/dL  / Bili: Negative / Urobili: 1.0 mg/dL   Blood: x / Protein: Negative mg/dL / Nitrite: Negative   Leuk Esterase: Negative / RBC: 0 /HPF / WBC 1 /HPF   Sq Epi: x / Non Sq Epi: 0 /HPF / Bacteria: Negative /HPF        TELEMETRY:     EKG:     IMAGING:       PATIENT:  DEVORAH VALENCIA  [id number]    CHIEF COMPLAINT:  Patient is a 58y old  Male who presents with a chief complaint of Cardiogenic shock (2023 21:04)      INTERVAL HISTORY/OVERNIGHT EVENTS: balloon pump changed to RFA      REVIEW OF SYSTEMS:  Constitutional:     [ X] negative [ ] fevers [ ] chills [ ] weight loss [ ] weight gain  HEENT:                  [X ] negative [ ] dry eyes [ ] eye irritation [ ] postnasal drip [ ] nasal congestion  CV:                         [ X] negative  [ ] chest pain [ ] orthopnea [ ] palpitations [ ] murmur  Resp:                     [X ] negative [ ] cough [ ] shortness of breath [ ] dyspnea [ ] wheezing [ ] sputum [ ] hemoptysis  GI:                          [X ] negative [ ] nausea [ ] vomiting [ ] diarrhea [ ] constipation [ ] abd pain [ ] dysphagia   :                        [X ] negative [ ] dysuria [ ] nocturia [ ] hematuria [ ] increased urinary frequency  Musculoskeletal: [X ] negative [ ] back pain [ ] myalgias [ ] arthralgias [ ] fracture  Skin:                       [X ] negative [ ] rash [ ] itch  Neurological:        [X ] negative [ ] headache [ ] dizziness [ ] syncope [ ] weakness [ ] numbness    [ ] All other systems negative  [ ] Unable to assess ROS because ________.    MEDICATIONS:  MEDICATIONS  (STANDING):  aMIOdarone    Tablet   Oral   aMIOdarone    Tablet 200 milliGRAM(s) Oral daily  aspirin  chewable 81 milliGRAM(s) Oral daily  atorvastatin 80 milliGRAM(s) Oral at bedtime  budesonide  80 MICROgram(s)/formoterol 4.5 MICROgram(s) Inhaler 2 Puff(s) Inhalation two times a day  carvedilol 25 milliGRAM(s) Oral every 12 hours  chlorhexidine 2% Cloths 1 Application(s) Topical daily  dextrose 50% Injectable 25 Gram(s) IV Push once  dextrose 50% Injectable 12.5 Gram(s) IV Push once  heparin  Infusion 1600 Unit(s)/Hr (14 mL/Hr) IV Continuous <Continuous>  hydrALAZINE 100 milliGRAM(s) Oral three times a day  insulin glargine Injectable (LANTUS) 12 Unit(s) SubCutaneous at bedtime  insulin lispro (ADMELOG) corrective regimen sliding scale   SubCutaneous at bedtime  insulin lispro (ADMELOG) corrective regimen sliding scale   SubCutaneous three times a day before meals  insulin lispro Injectable (ADMELOG) 8 Unit(s) SubCutaneous three times a day before meals  isosorbide   dinitrate Tablet (ISORDIL) 40 milliGRAM(s) Oral three times a day  polyethylene glycol 3350 17 Gram(s) Oral two times a day  senna 2 Tablet(s) Oral at bedtime  spironolactone 25 milliGRAM(s) Oral daily  vancomycin  IVPB 1500 milliGRAM(s) IV Intermittent every 24 hours    MEDICATIONS  (PRN):      ALLERGIES:  Allergies    penicillins (Unknown)    Intolerances        OBJECTIVE:  ICU Vital Signs Last 24 Hrs  T(C): 36.7 (2023 04:00), Max: 37.1 (2023 14:30)  T(F): 98.1 (2023 04:00), Max: 98.8 (2023 14:30)  HR: 74 (2023 07:00) (69 - 77)  BP: 104/65 (2023 08:00) (104/65 - 104/65)  BP(mean): 77 (2023 08:00) (77 - 77)  ABP: --  ABP(mean): --  RR: 11 (2023 07:00) (11 - 29)  SpO2: 97% (2023 07:00) (95% - 99%)    O2 Parameters below as of 2023 04:00  Patient On (Oxygen Delivery Method): room ai      POCT Blood Glucose.: 243 mg/dL (2023 22:04)  POCT Blood Glucose.: 166 mg/dL (2023 20:02)  POCT Blood Glucose.: 130 mg/dL (2023 17:47)  POCT Blood Glucose.: 106 mg/dL (2023 11:34)  POCT Blood Glucose.: 195 mg/dL (2023 07:51)    CAPILLARY BLOOD GLUCOSE      POCT Blood Glucose.: 243 mg/dL (2023 22:04)    I&O's Summary    2023 07:01  -  2023 07:00  --------------------------------------------------------  IN: 1704 mL / OUT: 1450 mL / NET: 254 mL      Daily     Daily Weight in k.7 (2023 05:00)    PHYSICAL EXAMINATION:  General: alert, in no acute distress  HEENT: PERRLA, EOMI, moist mucous membranes  Neurology: A&Ox3, nonfocal, MOISE x 4  Respiratory: CTA B/L, normal respiratory effort, no wheezes, crackles, rales  CV: RRR, S1S2, no murmurs, rubs or gallops  Abdominal: Soft, NT, ND +BS  Extremities: No edema, + palpable DP and radial pulses   Skin: warm and dry. L groin soft, nontender, dressing clean, dry and intact  Lines: RFA IABP dressing clean, dry and intact    LABS:                          11.4   11.15 )-----------( 271      ( 2023 03:50 )             34.0     11-    130<L>  |  100  |  35<H>  ----------------------------<  156<H>  4.7   |  17<L>  |  1.61<H>    Ca    10.0      2023 03:50  Phos  3.4       Mg     2.1         TPro  7.8  /  Alb  3.8  /  TBili  0.5  /  DBili  x   /  AST  22  /  ALT  47<H>  /  AlkPhos  72  21    LIVER FUNCTIONS - ( 2023 03:50 )  Alb: 3.8 g/dL / Pro: 7.8 g/dL / ALK PHOS: 72 U/L / ALT: 47 U/L / AST: 22 U/L / GGT: x           PT/INR - ( 2023 03:50 )   PT: 12.2 sec;   INR: 1.17 ratio         PTT - ( 2023 03:50 )  PTT:46.1 sec    Urinalysis Basic - ( 2023 03:50 )    Color: Yellow / Appearance: Clear / S.014 / pH: x  Gluc: 156 mg/dL / Ketone: Negative mg/dL  / Bili: Negative / Urobili: 1.0 mg/dL   Blood: x / Protein: Negative mg/dL / Nitrite: Negative   Leuk Esterase: Negative / RBC: 0 /HPF / WBC 1 /HPF   Sq Epi: x / Non Sq Epi: 0 /HPF / Bacteria: Negative /HPF      TELEMETRY: reviewed    EKG: reviewed    IMAGING: reviewed

## 2023-11-21 NOTE — PROGRESS NOTE ADULT - SUBJECTIVE AND OBJECTIVE BOX
DEVORAH VALENCIA  MRN-[id number]  Patient is a 58y old  Male who presents with a chief complaint of CHF pre advanced therapies work up (2023 18:18)    HPI:  pt seen and approx 1:30 pm  in [facility]    59yo M w/ hx HTN, CAD w/ 1 stent in , ICH () presenting with abn ekg. Patient presented to [facility] where he was found to have STEMI, recommended to get cath however patient did not want to get it there so it left and came here.  Patient initially had cough, congestion, fever, was placed on antibiotics on .  Started feeling nauseous and had a presyncopal event after which he presented to ED last night.  Had chest pain as well.  Chest pain is midsternal.  Not currently having chest pain.  Received 4 aspirin 30 min pta. (2023 15:11)    24 Hours:     REVIEW OF SYSTEMS:    CONSTITUTIONAL: No weakness, fevers or chills  EYES/ENT: No visual changes;  No vertigo or throat pain   NECK: No pain or stiffness  RESPIRATORY: No cough, wheezing, hemoptysis; No shortness of breath  CARDIOVASCULAR: No chest pain or palpitations  GASTROINTESTINAL: No abdominal or epigastric pain. No nausea, vomiting, or hematemesis; No diarrhea or constipation. No melena or hematochezia.  GENITOURINARY: No dysuria, frequency or hematuria  NEUROLOGICAL: No numbness or weakness  SKIN: No itching, rashes      Physical Exam:  Vital Signs Last 24 Hrs  T(C): 36.8 (2023 19:00), Max: 36.9 (2023 00:00)  T(F): 98.2 (2023 19:00), Max: 98.4 (2023 00:00)  HR: 78 (2023 20:00) (68 - 82)  BP: --  BP(mean): --  RR: 23 (2023 20:00) (11 - 57)  SpO2: 99% (2023 20:00) (97% - 100%)    Parameters below as of 2023 20:00  Patient On (Oxygen Delivery Method): room air    ============================I/O===========================   I&O's Detail    2023 07:01  -  2023 07:00  --------------------------------------------------------  IN:    Heparin: 224 mL    IV PiggyBack: 500 mL    Oral Fluid: 980 mL  Total IN: 1704 mL    OUT:    Voided (mL): 1450 mL  Total OUT: 1450 mL    Total NET: 254 mL      2023 07:01  -  2023 21:37  --------------------------------------------------------  IN:    Heparin: 156 mL    Oral Fluid: 1170 mL  Total IN: 1326 mL    OUT:    Voided (mL): 1400 mL  Total OUT: 1400 mL    Total NET: -74 mL        ============================ LABS =========================                        11.4   11.15 )-----------( 271      ( 2023 03:50 )             34.0         130<L>  |  100  |  35<H>  ----------------------------<  156<H>  4.7   |  17<L>  |  1.61<H>    Ca    10.0      2023 03:50  Phos  3.4       Mg     2.1         TPro  7.8  /  Alb  3.8  /  TBili  0.5  /  DBili  x   /  AST  22  /  ALT  47<H>  /  AlkPhos  72      LIVER FUNCTIONS - ( 2023 03:50 )  Alb: 3.8 g/dL / Pro: 7.8 g/dL / ALK PHOS: 72 U/L / ALT: 47 U/L / AST: 22 U/L / GGT: x           PT/INR - ( 2023 03:50 )   PT: 12.2 sec;   INR: 1.17 ratio         PTT - ( 2023 13:17 )  PTT:57.3 sec    Urinalysis Basic - ( 2023 03:50 )    Color: Yellow / Appearance: Clear / S.014 / pH: x  Gluc: 156 mg/dL / Ketone: Negative mg/dL  / Bili: Negative / Urobili: 1.0 mg/dL   Blood: x / Protein: Negative mg/dL / Nitrite: Negative   Leuk Esterase: Negative / RBC: 0 /HPF / WBC 1 /HPF   Sq Epi: x / Non Sq Epi: 0 /HPF / Bacteria: Negative /HPF      ======================Micro/Rad/Cardio=================  Culture: Reviewed   CXR: Reviewed  Echo:Reviewed  ======================================================  PAST MEDICAL & SURGICAL HISTORY:  HTN (hypertension)      CAD (coronary artery disease)  ; stent      Intracranial hemorrhage        Respiratory arrest        Myocardial infarction, unspecified MI type, unspecified artery      History of coronary artery stent placement        ====================ASSESSMENT ==============  CNS:  RES:  CVS:  Hem:  [place]:  GI:  Endo:  ID:  Skin  Plan:  ====================== NEUROLOGY=====================  hydrOXYzine hydrochloride 25 milliGRAM(s) Oral two times a day PRN Anxiety    ==================== RESPIRATORY======================  Mechanical Ventilation:    budesonide  80 MICROgram(s)/formoterol 4.5 MICROgram(s) Inhaler 2 Puff(s) Inhalation two times a day    ====================CARDIOVASCULAR==================  aMIOdarone    Tablet 200 milliGRAM(s) Oral daily  aMIOdarone    Tablet   Oral   carvedilol 25 milliGRAM(s) Oral every 12 hours  hydrALAZINE 100 milliGRAM(s) Oral three times a day  isosorbide   dinitrate Tablet (ISORDIL) 40 milliGRAM(s) Oral three times a day  spironolactone 25 milliGRAM(s) Oral daily    ===================HEMATOLOGIC/ONC ===================  aspirin  chewable 81 milliGRAM(s) Oral daily  heparin  Infusion 1600 Unit(s)/Hr (14.5 mL/Hr) IV Continuous <Continuous>    ===================== RENAL =========================  Continue monitoring urine output    ==================== GASTROINTESTINAL===================  polyethylene glycol 3350 17 Gram(s) Oral two times a day  senna 2 Tablet(s) Oral at bedtime    =======================    ENDOCRINE  =====================  atorvastatin 80 milliGRAM(s) Oral at bedtime  dextrose 50% Injectable 12.5 Gram(s) IV Push once  dextrose 50% Injectable 25 Gram(s) IV Push once  insulin glargine Injectable (LANTUS) 12 Unit(s) SubCutaneous at bedtime  insulin lispro (ADMELOG) corrective regimen sliding scale   SubCutaneous at bedtime  insulin lispro (ADMELOG) corrective regimen sliding scale   SubCutaneous three times a day before meals  insulin lispro Injectable (ADMELOG) 8 Unit(s) SubCutaneous three times a day before meals    ========================INFECTIOUS DISEASE================  vancomycin  IVPB 1500 milliGRAM(s) IV Intermittent every 24 hours      ========================PROPHYLACTIC MEASURE================  DVT  GI Protonix  Graft patency   Beta blocker      Patient requires continuous monitoring with bedside rhythm monitoring, arterial line, pulse oximetry, ventilator monitoring and intermittent blood gas analysis.  Care plan discussed with ICU care team.  Patient remained critical; required more than usual post op care; I have spent 35 minutes providing non-routine post op care, revaluated multiple times during the day. DEVORAH VALENCIA  MRN-[id number]  Patient is a 58y old  Male who presents with a chief complaint of CHF pre advanced therapies work up (2023 18:18)    HPI:  pt seen and approx 1:30 pm  in [facility]    57yo M w/ hx HTN, CAD w/ 1 stent in , ICH () presenting with abn ekg. Patient presented to [facility] where he was found to have STEMI, recommended to get cath however patient did not want to get it there so it left and came here.  Patient initially had cough, congestion, fever, was placed on antibiotics on .  Started feeling nauseous and had a presyncopal event after which he presented to ED last night.  Had chest pain as well.  Chest pain is midsternal.  Not currently having chest pain.  Received 4 aspirin 30 min pta. (2023 15:11)    24 Hours: pt with IABP awaiting OHT presentation likely tomorrow    REVIEW OF SYSTEMS:    CONSTITUTIONAL: No weakness, fevers or chills  EYES/ENT: No visual changes;  No vertigo or throat pain   NECK: No pain or stiffness  RESPIRATORY: No cough, wheezing, hemoptysis; No shortness of breath  CARDIOVASCULAR: No chest pain or palpitations  GASTROINTESTINAL: No abdominal or epigastric pain. No nausea, vomiting, or hematemesis; No diarrhea or constipation. No melena or hematochezia.  GENITOURINARY: No dysuria, frequency or hematuria  NEUROLOGICAL: No numbness or weakness  SKIN: No itching, rashes      Physical Exam:  Vital Signs Last 24 Hrs  T(C): 36.8 (2023 19:00), Max: 36.9 (2023 00:00)  T(F): 98.2 (2023 19:00), Max: 98.4 (2023 00:00)  HR: 78 (2023 20:00) (68 - 82)  BP: --  BP(mean): --  RR: 23 (2023 20:00) (11 - 57)  SpO2: 99% (2023 20:00) (97% - 100%)    Parameters below as of 2023 20:00  Patient On (Oxygen Delivery Method): room air    ============================I/O===========================   I&O's Detail    2023 07:01  -  2023 07:00  --------------------------------------------------------  IN:    Heparin: 224 mL    IV PiggyBack: 500 mL    Oral Fluid: 980 mL  Total IN: 1704 mL    OUT:    Voided (mL): 1450 mL  Total OUT: 1450 mL    Total NET: 254 mL      2023 07:01  -  2023 21:37  --------------------------------------------------------  IN:    Heparin: 156 mL    Oral Fluid: 1170 mL  Total IN: 1326 mL    OUT:    Voided (mL): 1400 mL  Total OUT: 1400 mL    Total NET: -74 mL        ============================ LABS =========================                        11.4   11.15 )-----------( 271      ( 2023 03:50 )             34.0         130<L>  |  100  |  35<H>  ----------------------------<  156<H>  4.7   |  17<L>  |  1.61<H>    Ca    10.0      2023 03:50  Phos  3.4       Mg     2.1         TPro  7.8  /  Alb  3.8  /  TBili  0.5  /  DBili  x   /  AST  22  /  ALT  47<H>  /  AlkPhos  72      LIVER FUNCTIONS - ( 2023 03:50 )  Alb: 3.8 g/dL / Pro: 7.8 g/dL / ALK PHOS: 72 U/L / ALT: 47 U/L / AST: 22 U/L / GGT: x           PT/INR - ( 2023 03:50 )   PT: 12.2 sec;   INR: 1.17 ratio         PTT - ( 2023 13:17 )  PTT:57.3 sec    Urinalysis Basic - ( 2023 03:50 )    Color: Yellow / Appearance: Clear / S.014 / pH: x  Gluc: 156 mg/dL / Ketone: Negative mg/dL  / Bili: Negative / Urobili: 1.0 mg/dL   Blood: x / Protein: Negative mg/dL / Nitrite: Negative   Leuk Esterase: Negative / RBC: 0 /HPF / WBC 1 /HPF   Sq Epi: x / Non Sq Epi: 0 /HPF / Bacteria: Negative /HPF      ======================Micro/Rad/Cardio=================  Culture: Reviewed   CXR: Reviewed  Echo:Reviewed  ======================================================  PAST MEDICAL & SURGICAL HISTORY:  HTN (hypertension)      CAD (coronary artery disease)  ; stent      Intracranial hemorrhage        Respiratory arrest        Myocardial infarction, unspecified MI type, unspecified artery      History of coronary artery stent placement        ====================ASSESSMENT ==============  58 male with HTN, CAD (s/p PCI ), HFrEF, CVA , and T2DM presenting with chest pressure and unknown tachycardia that was shocked x1, [facility]  found to have in-stent restenosis of pLAD and  of RCA with elevated RA and PA pressures and severely decreased EF 32%, admitted to CICU for management of cardiogenic shock requiring IABP  -, with hospital course c/b vfib arrest requiring reinsertion of IABP. Currently undergoing workup for AT evaluation with HF.    Plan:  ====================== NEUROLOGY=====================  # Anxiety  - No Seroquel/antipsychotics since vfib arrest and prolonged QTC  - Psych Eval last week, recs for continuing Ativan PRN and had recommended SSRI, but pt. refused   - psych recommnding atarax PRN  - continue to monitor mental status  - continue band exercised while on bedrest s/t IABP    ==================== RESPIRATORY======================  # Acute Hypoxemic Respiratory Failure  - s/p x2 intubations for cardiogenic pulm edema and the in setting of cardiac arrest, resolved - extubated 11/10  - Currently on room air with spO2 mid 90s  - Continue incentive spirometry and monitoring of sp02    # Asthma  - c/w albuterol, symbicort and spiriva  - on trelegy at home    ====================CARDIOVASCULAR===================  # Vfib arrest i/s/o ischemia  - Lido gtt off since   - PO Amio load dose (s/p IV amio ~1950mg IV gtt) total of 5 grams per EP complete , continue PO amio  - Keep K > 4, Mag > 2.2     # Cardiogenic shock requiring IABP (- , -)  - Likely 2/2 NSTEMI and ADHF  -  LHC: pLAD 100 % in-stent restenosis & mRCA, 100 %. PCWP 30. IABP placed.  -  TTE: LV dilated. EF 32 %. Regional WMAs present, mod (grade 2) LV diastolic dysfunction  -  TTE: EF 22% and + LV thrombus  - PRN Bumex IVP as needed, holding diuresis today  - IABP swapped  to RFA, continue 1:1 support  - Off Milrinone gtt @ 7:30 am   - Currently on hydralazine 100 TID and ISD 40 TID for AL reduction  - Continue romel 25 daily and coreg 25 BID for GDMT  - AT Eval, F/u HF recs    # NSTEMI iso stent re-occlusion of pLAD and 100%  of RCA  - EKG on admission w/LBBB  - DAPT: c/w ASA, Brilinta d/c'd per transplant w/u  - c/w lipitor 80  - cMR deferred given necessity of IABP  - CT sx not recommending CABG, undergoing AT eval    # LV thrombus  - c/w heparin gtt    ===================HEMATOLOGIC/ONC ===================  # VTE prophylaxis   - c/w heparin gtt given LV Thrombus and IABP   - H/H and platelets stable  - Continue to monitor daily    ===================== RENAL =========================  # Non-oliguric ARIC   - sCr stable, Baseline Cr: 1-1.22  - Diuresis held, optimize pre-renal factors for optimal perfusion and volume status  - f/u renal US  - Trend BMP, lytes daily, replace as needed  - Continue Strict I/Os, avoid nephrotoxins    ==================== GASTROINTESTINAL===================  DASH diet  - Continue to monitor for BM    =======================    ENDOCRINE  =====================  # Type 2 DM  - A1c 8.3, glucose controlled  - Continue lantus, premeal, low ISS    ========================INFECTIOUS DISEASE================  # Enterococcus faecalis bacteremia  - Concern for aspiration event prior to intubation on admission - s/p vanco and cefepime course  - BCx 11/10 NGTD, RVP neg, MRSA neg, UA neg  - BCx + for enterococcus, started on Vanco (by level)   - BCx  NGTD, urine cx + enterococcus  - IABP site swapped to LRFA   - continue vancomycin by level (-  - CT A/P negative for infectious pathology  - f/u ID recomendations    # COVID, resolved  - Off airborne precautions     # Pre-transplant ID w/u   - Trend ID recs for serologies   - Colonoscopy  - normal   - Chest CT  - improved LLL aeration  - f/u abdominal US    LINES:   RIJ [place] -, RAx Jacklyn -, LFA IABP  -, RFA IABP -    Ethics/Dispo: full code, maintain in CICU      Patient requires continuous monitoring with bedside rhythm monitoring, arterial line, pulse oximetry, ventilator monitoring and intermittent blood gas analysis.  Care plan discussed with ICU care team.  Patient remained critical; required more than usual post op care; I have spent 35 minutes providing non-routine post op care, revaluated multiple times during the day.

## 2023-11-21 NOTE — PROGRESS NOTE ADULT - SUBJECTIVE AND OBJECTIVE BOX
History of Present Illness: Mr. Hardy is a 58 year-old man with history of HTN, CAD (1 stent in 2009), ICH (2008) presented on 11/1 with abnormal EKG. Patient presented to [facility] where he was found to have STEMI, recommended to get cath however patient did not want treatment at OSH so left and came to [facility]. EKG here with ST depression in lateral leads and elevation in anterior leads. Prior to C found to be tachycardic, dyspneic, intubated. LHC 11/1 with chronic total occlusion of LAD and RCA, with elevated RA and PA pressures. TTE 11/1 with severely decreased EF 32%, s/p IABP 11/1.     Social history: Mr. Martin Hardy is a 58-year-old [place] American,  male with three children, including two sons and three daughters. 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 His wife, Brynn, works as an  in a public high school in [place] [facility]. Mr. Hardy reported three close friendships with men who live in [place]. Notably, one of these men, Dr. Brody Le, is Mr. Hardy’s healthcare proxy and PCP. Mr. Hardy also reported that these friends feel like family due to their closeness. Currently, Mr. Hardy lives in a condominium apartment in [place] with his wife and children. 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 Mr. Hardy noted his parents live in [place] as does one brother and one sister. Mr. Hardy has another sister, diagnosed with bipolar disorder, who lives in [place]. He described feeling distant from his siblings, as they lack a close relationship.      Substance use:    •	Tobacco: Denies current and past tobacco use.    •	Alcohol: Denies current and past alcohol use.   •	Drug: Denies current and past drug use.       Past psychiatric history: Mr. Hardy denied any history of self-harm and suicidal ideation, plan, or attempt. He also denied any history of violent thoughts or behaviors. Denies history of outpatient treatment with a therapist or psychiatrist. With this said, he reported a history of odd thinking related, health-related anxiety. For example, Mr. Hardy reported meeting with a physician about 12 years ago because he feared having a brain tumor. He believed that the presence of a brain tumor could explain his superior memory. Earlier this year, he revealed anxiety to his PCP, who prescribed Mr. Hardy Lorazepam (0.5mg PRN). Per medical chart, Mr. Hardy filled his prescription three separate times in the past year. He reported concern about dependency, given Lorazepam’s status as a controlled medication, and desire to avoid substance abuse.      Psychological assessment: Mr. Hardy reported some stress related to eventual heart surgery, medical uncertainty, and existential concerns. He reporting passing the time by watching shows, listening to music, and meeting with visitors. He described a "burden" of helping his children achieve financial independence, and establishing financial support for his wife. He acknowledged that he previously imagined living until he was about 89 years old; however, he no longer views that as a possibility. Mr. Hardy hopes to play golf, fish with friends, and play the piano following surgery. This writer encouraged Mr. Hardy to view these hopes as goals he can aspire to achieve after discharge. In addition, this writer encouraged Mr. Hardy to identify self-care, longevity, and providing for his family as three interrelated concepts. Mr. Nunez subsequently noted an interest in focusing on self-care and continuing to prioritize his health following the surgery.     Mental Status Exam: Seen sitting in bed on CICU. Awake, alert. Oriented x4. Well related. Maintained good eye contact. Speech was normal volume, rate, tone. Mood was neutral and affect was euthymic. Thought process goal directed with some tangentiality but easily redirected, content focused on existential worries. Denies s/i. Insight into disease adequate. Immediate judgement good.        Dx: Adjustment Disorder with anxiety     No absolute psychological contraindications for pursuing heart transplant/LVAD with following recommendations:     ·	Psychology will continue to follow for supportive therapy and CBT strategies to manage anxiety.  ·	Seen by psychiatry (11/8), will benefit from follow up.   ·	Maintain ongoing psychiatric follow-up.   ·	Holistic Nurse.   ·	Pet therapy if appropriate.   ·	LVAD and heart transplant support group information provided.       45 minutes spent on patient encounter   History of Present Illness: Mr. Hardy is a 58 year-old man with history of HTN, CAD (1 stent in 2009), ICH (2008) presented on 11/1 with abnormal EKG. Patient presented to [facility] where he was found to have STEMI, recommended to get cath however patient did not want treatment at OSH so left and came to [facility]. EKG here with ST depression in lateral leads and elevation in anterior leads. Prior to C found to be tachycardic, dyspneic, intubated. LHC 11/1 with chronic total occlusion of LAD and RCA, with elevated RA and PA pressures. TTE 11/1 with severely decreased EF 32%, s/p IABP 11/1.     Social history: Mr. Martin Hardy is a 58-year-old Vietnamese American,  male with three children, including two sons and three daughters. 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 His wife, Brynn, works as an  in a public high school in [facility]. Mr. Hardy reported three close friendships with men who live in [place]. Notably, one of these men, Dr. Brody Le, is Mr. Hardy’s healthcare proxy and PCP. Mr. Hardy also reported that these friends feel like family due to their closeness. Currently, Mr. Hardy lives in a condominium apartment in [place] with his wife and children. 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 Mr. Hardy noted his parents live in [place] as does one brother and one sister. Mr. Hardy has another sister, diagnosed with bipolar disorder, who lives in [place]. He described feeling distant from his siblings, as they lack a close relationship.      Substance use:    •	Tobacco: Denies current and past tobacco use.    •	Alcohol: Denies current and past alcohol use.   •	Drug: Denies current and past drug use.       Past psychiatric history: Mr. Hardy denied any history of self-harm and suicidal ideation, plan, or attempt. He also denied any history of violent thoughts or behaviors. Denies history of outpatient treatment with a therapist or psychiatrist. With this said, he reported a history of odd thinking related, health-related anxiety. For example, Mr. Hardy reported meeting with a physician about 12 years ago because he feared having a brain tumor. He believed that the presence of a brain tumor could explain his superior memory. Earlier this year, he revealed anxiety to his PCP, who prescribed Mr. Hardy Lorazepam (0.5mg PRN). Per medical chart, Mr. Hardy filled his prescription three separate times in the past year. He reported concern about dependency, given Lorazepam’s status as a controlled medication, and desire to avoid substance abuse.      Psychological assessment: Mr. Hardy reported some stress related to eventual heart surgery, medical uncertainty, and existential concerns. He reporting passing the time by watching shows, listening to music, and meeting with visitors. He described a "burden" of helping his children achieve financial independence, and establishing financial support for his wife. He acknowledged that he previously imagined living until he was about 89 years old; however, he no longer views that as a possibility. Mr. Hardy hopes to play golf, fish with friends, and play the piano following surgery. This writer encouraged Mr. Hardy to view these hopes as goals he can aspire to achieve after discharge. In addition, this writer encouraged Mr. Hardy to identify self-care, longevity, and providing for his family as three interrelated concepts. Mr. Nunez subsequently noted an interest in focusing on self-care and continuing to prioritize his health following the surgery.     Mental Status Exam: Seen sitting in bed on CICU. Awake, alert. Oriented x4. Well related. Maintained good eye contact. Speech was normal volume, rate, tone. Mood was neutral and affect was euthymic. Thought process goal directed with some tangentiality but easily redirected, content focused on existential worries. Denies s/i. Insight into disease adequate. Immediate judgement good.        Dx: Adjustment Disorder with anxiety     No absolute psychological contraindications for pursuing heart transplant/LVAD with following recommendations:     ·	Psychology will continue to follow for supportive therapy and CBT strategies to manage anxiety.  ·	Seen by psychiatry (11/8), will benefit from follow up.   ·	Maintain ongoing psychiatric follow-up.   ·	Holistic Nurse.   ·	Pet therapy if appropriate.   ·	LVAD and heart transplant support group information provided.       45 minutes spent on patient encounter      Remy Jacobs MA   Psychology Extern     Karla Villalobos, PhD  Supervising Psychologist

## 2023-11-21 NOTE — PROGRESS NOTE ADULT - ASSESSMENT
58 yrs old male w/ hx of HFrEF (LVIDd 6.4 cm, LVEF 32%), CAD s/p PCI (2008), HTN, DMT2 (A1c 8.3%) and CVA s/p TPA (2018), recently treated for PNA who initially presented to [facility] via EMS after syncope reportedly requiring defibrilation. Treated for ACS but left AMA to come to [facility]. Pt has covid and was placed on IABP for hypotension. Now being evaluated for Heart transplant Vs LVAD placement. Nephrology consulted for ARIC

## 2023-11-21 NOTE — PROGRESS NOTE ADULT - PROBLEM SELECTOR PLAN 5
BCx from 11/17 with GPC in pair/chains in both bottles. Awaiting speciation  Repeat cultures from 11/18 in lab, follow up results  Continue vanc with plan to change to ampicillin pending further investigation into PCN allergy per ID  -s/p IABP exchange from RFA to LFA on 11/20

## 2023-11-21 NOTE — PROGRESS NOTE ADULT - PROBLEM SELECTOR PLAN 1
- L IABP at 1:1, placed 11/9. On AC with Heparin infusion (also for LV thrombus); s/p exchange to LFA given high grade bacteremia on 11/20  - Currently on Coreg 25 mg BID ?? hold for MAP <65  - Continue HDZN 100 mg TID and ISDN 40 mg TID, hold for MAP <65  - Continue Spironolactone 25 mg QD  - PRN diuretics to target net even/- 500 fluid balance; Would give x1 dose Bumex IVP today to meet goal   - AT evaluation: launched 11/10.   GI on board, s/p bedside colonoscopy; No masses or polyps found. Cleared from GI standpoint.   Appreciate cardiorenal c/s   As per hepatology, US Elastography demonstrates minimal risk of clinically significant fibrosis.   Please obtain Abd US/doppler to assess patency of hepatic and portal veins as requested on 11/13. PENDING  If doppler is negative, there is no hepatic contraindication or further evaluation needed for advanced heart failure therapy.    S/p CT chest with improved LLL aeration.    cPRA 0% 11/13  Last Brilinta dose was on 11/13  Will need TxID clearance prior to listing  - Please keep primary Dr. Banuelos [phone] in the loop per family request.  - Repeat TTE today - L IABP at 1:1, placed 11/9. On AC with Heparin infusion (also for LV thrombus); s/p exchange to LFA given high grade bacteremia on 11/20  - Currently on Coreg 25 mg BID ?? hold for MAP <65  - Continue HDZN 100 mg TID and ISDN 40 mg TID, hold for MAP <65  - Continue Spironolactone 25 mg QD  - PRN diuretics to target net even/- 500 fluid balance; Would give x1 dose Bumex IVP today to meet goal   - AT evaluation: launched 11/10.   GI on board, s/p bedside colonoscopy; No masses or polyps found. Cleared from GI standpoint.   Appreciate cardiorenal c/s   As per hepatology, US Elastography demonstrates minimal risk of clinically significant fibrosis.   11/20 Abd US/doppler demonstrates patency of hepatic and portal veins, no renal artery stenosis. Awaiting hepatology input.    S/p CT chest with improved LLL aeration.    cPRA 0% 11/13  Last Brilinta dose was on 11/13  Will need TxID clearance prior to listing  - Please keep primary Dr. Banuelos [phone] in the loop per family request.  - Repeat TTE today

## 2023-11-21 NOTE — PROGRESS NOTE ADULT - PROBLEM SELECTOR PLAN 1
ARIC in the setting of heart failure, COVID infection. At the time of presentation Scr is 1.25. Started to worsen on 11/4 and peaked at 4.07 11/10 and gradually improved to 1.8 on 11/18. Pt had LHC on 11/1. He has hx of DM with proteinuria of 684 but most recent UA negative. Primary team is planning to get cardiac transplant eval. Currently nonoliguric, UOP of 1.2L. BETZAIDA, HIV, Hep B, Hep C - negative. A1c - 8.3. Complements are not low. BETZAIDA and ANCA negative. Light chains - not significant. UPCR <0.1    PLAN:  No acute intervention from nephrology point of view is indicated. Scr stable at 1.79 today.   Pending serological workup - PLA2R Ab, , Anti GBM antibody, Anti Ds DNA, immunofixation,    Please order kidney duplex   Avoid nephrotoxic agents. Dose meds per eGFR.       If you have any questions, please feel free to contact me  Ramon Isaac  Nephrology Fellow  [phone]; Prefer Teams.  (After 5pm or on weekends please page the on-call fellow).

## 2023-11-21 NOTE — PROGRESS NOTE ADULT - NSPROGADDITIONALINFOA_GEN_ALL_CORE
59yo M HTN CAD prior stroke DM, HFrEF admitted with cardiogenic and now septic shock with E Faecalis/ staph epi bacteremia.  Neuro: no acute deficits  CV: Cardiogenic shock on IABP, off milrinone. Exchanged IABP and cont PO afterload reduction. Cont PO amio for VT. Continue with ASA  Resp: on RA  GI: DASH DM diet  Renal: Non-oliguric ARIC that has been improving  Heme: H/H low but acceptable on Heparin drip for LV thrombus + IABP  ID: Afebrile, on abx, ID is following (will need clearance prior to transplant after 72h negative blood cultures). Urine positive for entercoccus  Endo: Sugars overall controlled on Lantus  Lines: RFA IABP 11/20

## 2023-11-21 NOTE — CHART NOTE - NSCHARTNOTEFT_GEN_A_CORE
Nutrition Follow Up Note  Patient seen for: follow up  Chart reviewed, events noted    Per chart, patient is a 57 y/o male with PMH including HTN, CAD (s/p PCI ), HFrEF (EF severely reduced ) not on GDMT, h/o CVA  (requiring TPA), DM. Patient presents to [facility] for SOB x1 week. Intubated on  for respiratory failure. Extubated to nasal cannula 11/3. Found to be COVID-19 positive w/ persistent fevers. S/p IABP for hemodynamic support in setting of possible sepsis. S/p VFib arrest requiring multiple shocks, ROSC ~10 minutes on , transfers back to CICU.    Source: [x] Patient       [x] EMR        [x] RN        [x] Family at bedside; pt's daughter       [] Other:  - If unable to interview patient: [] Trach/Vent/BiPAP  [] Disoriented/confused/inappropriate to interview    Diet, Regular:   Consistent Carbohydrate {Evening Snack} (CSTCHOSN)  Low Sodium (23 @ 13:54) [Active]  -> Pt reports adequate oral intake at this time; RD observed pt consumed scrambled eggs, 1 pancake and home fries this morning    Nutrition-related concerns:  - Under Advanced Therapies evaluation  - Remains on IABP for mechanical support  - 12 units lantus, ISS admleog for glycemic control at this time; HbA1c: 8.3% (suboptimal glycemic control for age)    GI: Last BM: 11/17x1 per pt reports   Bowel Regimen? [x] Yes - senna, miralax    Weights:   Daily Weight in k.7 (-21), Weight in k.2 (-20), Weight in k.8 (11-19), Weight in k.5 (11-18), Weight in k.5 (11-17), Weight in k.8 (11-16), Weight in k.5 (11-15)  Wt history: Pt reports UBW ~180-190 pounds; reports recent weights likely elevated secondary to fluid retention. On/off diuresis. Will monitor trends as able. Noted weight trending downward towards UBW of ~81-86 kG.    Drug Dosing Weight  Height (cm): 175.3 (2023 15:48)  Weight (kg): 98.9 (2023 15:48)  BMI (kg/m2): 32.2 (2023 15:48)-> recalculated based on lowest on daily wt of 89.7 kG (-) (29.2 kG/m2).  BSA (m2): 2.14 (2023 15:48)  Reported UBW: ~81-86 kG    MEDICATIONS  (STANDING):  aMIOdarone    Tablet   Oral   aMIOdarone    Tablet 200 milliGRAM(s) Oral daily  aspirin  chewable 81 milliGRAM(s) Oral daily  atorvastatin 80 milliGRAM(s) Oral at bedtime  budesonide  80 MICROgram(s)/formoterol 4.5 MICROgram(s) Inhaler 2 Puff(s) Inhalation two times a day  carvedilol 25 milliGRAM(s) Oral every 12 hours  chlorhexidine 2% Cloths 1 Application(s) Topical daily  dextrose 50% Injectable 12.5 Gram(s) IV Push once  dextrose 50% Injectable 25 Gram(s) IV Push once  heparin  Infusion 1600 Unit(s)/Hr (14 mL/Hr) IV Continuous <Continuous>  hydrALAZINE 100 milliGRAM(s) Oral three times a day  insulin glargine Injectable (LANTUS) 12 Unit(s) SubCutaneous at bedtime  insulin lispro (ADMELOG) corrective regimen sliding scale   SubCutaneous three times a day before meals  insulin lispro (ADMELOG) corrective regimen sliding scale   SubCutaneous at bedtime  insulin lispro Injectable (ADMELOG) 8 Unit(s) SubCutaneous three times a day before meals  isosorbide   dinitrate Tablet (ISORDIL) 40 milliGRAM(s) Oral three times a day  polyethylene glycol 3350 17 Gram(s) Oral two times a day  senna 2 Tablet(s) Oral at bedtime  spironolactone 25 milliGRAM(s) Oral daily  vancomycin  IVPB 1500 milliGRAM(s) IV Intermittent every 24 hours    Pertinent Labs:  @ 03:50: Na 130<L>, BUN 35<H>, Cr 1.61<H>, <H>, K+ 4.7, Phos 3.4, Mg 2.1, Alk Phos 72, ALT/SGPT 47<H>, AST/SGOT 22, HbA1c --    A1C with Estimated Average Glucose Result: 8.3 % (23 @ 06:14)    Finger Sticks:  POCT Blood Glucose.: 172 mg/dL ( @ 08:53)  POCT Blood Glucose.: 243 mg/dL ( @ 22:04)  POCT Blood Glucose.: 166 mg/dL ( @ 20:02)  POCT Blood Glucose.: 130 mg/dL ( @ 17:47)    Triglycerides, Serum: 95 mg/dL (11-10-23 @ 17:25)  Triglycerides, Serum: 344 mg/dL (23 @ 01:49)    Skin per nursing documentation: no pressure injuries per documentation  Edema: none per documentation    Estimated Needs:   [x] recalculated: based on wt of 89 kG:  Estimated Caloric Needs: (23-28 kcal/kg): 2922-0391 kcal  Estimated Protein Needs: (1.3-1.5 g/kg): 115-133 gm  Defer fluid needs to team.    Previous Nutrition Diagnosis: increased nutrient needs  Nutrition Diagnosis is: [x] ongoing - being addressed with adequate oral intake    Previous Nutrition Diagnosis: Inadequate energy intake  Nutrition Diagnosis is: [x] N/A; pt with adequate oral intake at this time    Previous Nutrition Diagnosis: Food & Nutrition-Related Knowledge Deficit   Nutrition Diagnosis is: [x] ongoing - being addressed with ongoing diet education PRN    New Nutrition Diagnosis: [x] N/A    Nutrition Care Plan:  [x] In Progress  [] Achieved  [] Not applicable    Nutrition Interventions:     Education Provided:       [x] Yes: Previous diet education provided In the setting of LVAD and heart transplant evaluation, RD reviewed food safety after solid organ transplant guidelines; including, storage/cooking temperatures, cross contamination, expiration dates, and avoiding buffets and avoid eating out. Discussed S&S of food borne illness.  Reviewed food and immunosuppressive drug interactions (prednisone, tacrolimus, and cellcept). Reviewed the importance of BG control while on prednisone. Reviewed importance of a low K+ diet and reviewed foods high in K+ to be mindful of. Reviewed importance of avoiding grapefruit, pomegranate, starfruit and sour oranges. Pt was receptive to information and was able to use teach back points to verbalize understanding. Pt accepted written materials on discussed topics. Pt did not want to discuss LVAD education at this time.    Recommendations:  1. Continue current Consistent Carbohydrate + Low Sodium diet as tolerated  2. RD remains available to provide ongoing nutrition education PRN   3. Trend BG levels, renal indices, LFT's, electrolytes and triglycerides   4. Honor pt food preferences to promote adequate oral intake while in-house     Monitoring and Evaluation:   Continue to monitor nutritional intake, tolerance to diet prescription, weights, labs, skin integrity    RD remains available upon request and will follow up per protocol  Sultana Llanes, MS, RD, CDN, CNSC avail. on MS Teams Nutrition Follow Up Note  Patient seen for: follow up  Chart reviewed, events noted    Per chart, patient is a 57 y/o male with PMH including HTN, CAD (s/p PCI ), HFrEF (EF severely reduced ) not on GDMT, h/o CVA  (requiring TPA), DM. Patient presents to [facility] for SOB x1 week. Intubated on  for respiratory failure. Extubated to nasal cannula 11/3. Found to be COVID-19 positive w/ persistent fevers. S/p IABP for hemodynamic support in setting of possible sepsis. S/p VFib arrest requiring multiple shocks, ROSC ~10 minutes on , transfers back to CICU.    Source: [x] Patient       [x] EMR        [x] RN        [x] Family at bedside; pt's daughter       [] Other:  - If unable to interview patient: [] Trach/Vent/BiPAP  [] Disoriented/confused/inappropriate to interview    Diet, Regular:   Consistent Carbohydrate {Evening Snack} (CSTCHOSN)  Low Sodium (23 @ 13:54) [Active]  -> Pt reports adequate oral intake at this time; RD observed pt consumed scrambled eggs, 1 pancake and home fries this morning    Nutrition-related concerns:  - Under Advanced Therapies evaluation  - Remains on IABP for mechanical support  - 12 units lantus, ISS admleog for glycemic control at this time; HbA1c: 8.3% (suboptimal glycemic control for age)    GI: Last BM: 11/17x1 per pt reports   Bowel Regimen? [x] Yes - senna, miralax    Weights:   Daily Weight in k.7 (-21), Weight in k.2 (-20), Weight in k.8 (11-19), Weight in k.5 (11-18), Weight in k.5 (11-17), Weight in k.8 (11-16), Weight in k.5 (11-15)  Wt history: Pt reports UBW ~180-190 pounds; reports recent weights likely elevated secondary to fluid retention. On/off diuresis. Will monitor trends as able. Noted weight trending downward towards UBW of ~81-86 kG.    Drug Dosing Weight  Height (cm): 175.3 (2023 15:48)  Weight (kg): 98.9 (2023 15:48)  BMI (kg/m2): 32.2 (2023 15:48)-> recalculated based on lowest on daily wt of 89.7 kG (-) (29.2 kG/m2).  BSA (m2): 2.14 (2023 15:48)  Reported UBW: ~81-86 kG    MEDICATIONS  (STANDING):  aMIOdarone    Tablet   Oral   aMIOdarone    Tablet 200 milliGRAM(s) Oral daily  aspirin  chewable 81 milliGRAM(s) Oral daily  atorvastatin 80 milliGRAM(s) Oral at bedtime  budesonide  80 MICROgram(s)/formoterol 4.5 MICROgram(s) Inhaler 2 Puff(s) Inhalation two times a day  carvedilol 25 milliGRAM(s) Oral every 12 hours  chlorhexidine 2% Cloths 1 Application(s) Topical daily  dextrose 50% Injectable 12.5 Gram(s) IV Push once  dextrose 50% Injectable 25 Gram(s) IV Push once  heparin  Infusion 1600 Unit(s)/Hr (14 mL/Hr) IV Continuous <Continuous>  hydrALAZINE 100 milliGRAM(s) Oral three times a day  insulin glargine Injectable (LANTUS) 12 Unit(s) SubCutaneous at bedtime  insulin lispro (ADMELOG) corrective regimen sliding scale   SubCutaneous three times a day before meals  insulin lispro (ADMELOG) corrective regimen sliding scale   SubCutaneous at bedtime  insulin lispro Injectable (ADMELOG) 8 Unit(s) SubCutaneous three times a day before meals  isosorbide   dinitrate Tablet (ISORDIL) 40 milliGRAM(s) Oral three times a day  polyethylene glycol 3350 17 Gram(s) Oral two times a day  senna 2 Tablet(s) Oral at bedtime  spironolactone 25 milliGRAM(s) Oral daily  vancomycin  IVPB 1500 milliGRAM(s) IV Intermittent every 24 hours    Pertinent Labs:  @ 03:50: Na 130<L>, BUN 35<H>, Cr 1.61<H>, <H>, K+ 4.7, Phos 3.4, Mg 2.1, Alk Phos 72, ALT/SGPT 47<H>, AST/SGOT 22, HbA1c --    A1C with Estimated Average Glucose Result: 8.3 % (23 @ 06:14)    Finger Sticks:  POCT Blood Glucose.: 172 mg/dL ( @ 08:53)  POCT Blood Glucose.: 243 mg/dL ( @ 22:04)  POCT Blood Glucose.: 166 mg/dL ( @ 20:02)  POCT Blood Glucose.: 130 mg/dL ( @ 17:47)    Triglycerides, Serum: 95 mg/dL (11-10-23 @ 17:25)  Triglycerides, Serum: 344 mg/dL (23 @ 01:49)    Skin per nursing documentation: no pressure injuries per documentation  Edema: none per documentation    Estimated Needs:   [x] recalculated: based on wt of 89 kG:  Estimated Caloric Needs: (23-28 kcal/kg): 5052-0765 kcal  Estimated Protein Needs: (1.3-1.5 g/kg): 115-133 gm  Defer fluid needs to team.    Previous Nutrition Diagnosis: increased nutrient needs  Nutrition Diagnosis is: [x] ongoing - being addressed with adequate oral intake    Previous Nutrition Diagnosis: Inadequate energy intake  Nutrition Diagnosis is: [x] N/A; pt with adequate oral intake at this time    Previous Nutrition Diagnosis: Food & Nutrition-Related Knowledge Deficit   Nutrition Diagnosis is: [x] ongoing - being addressed with ongoing diet education PRN    New Nutrition Diagnosis: [x] N/A    Nutrition Care Plan:  [x] In Progress  [] Achieved  [] Not applicable    Nutrition Interventions:     Education Provided:       [x] Yes: Previous diet education provided In the setting of LVAD and heart transplant evaluation, RD reviewed food safety after solid organ transplant guidelines; including, storage/cooking temperatures, cross contamination, expiration dates, and avoiding buffets and avoid eating out. Discussed S&S of food borne illness.  Reviewed food and immunosuppressive drug interactions (prednisone, tacrolimus, and cellcept). Reviewed the importance of BG control while on prednisone. Reviewed importance of a low K+ diet and reviewed foods high in K+ to be mindful of. Reviewed importance of avoiding grapefruit, pomegranate, starfruit and sour oranges. Pt was receptive to information and was able to use teach back points to verbalize understanding. Pt accepted written materials on discussed topics. Pt did not want to discuss LVAD education at this time.    Recommendations:  1. Continue current Consistent Carbohydrate + Low Sodium diet as tolerated  2. RD remains available to provide ongoing nutrition education PRN   3. Trend BG levels, renal indices, LFT's, electrolytes and triglycerides   4. Honor pt food preferences to promote adequate oral intake while in-house     Monitoring and Evaluation:   Continue to monitor nutritional intake, tolerance to diet prescription, weights, labs, skin integrity    RD remains available upon request and will follow up per protocol  Sultana Velazquez, MS, RD, CDN, CNSC avail. on MS Teams

## 2023-11-22 DIAGNOSIS — E87.1 HYPO-OSMOLALITY AND HYPONATREMIA: ICD-10-CM

## 2023-11-22 LAB
-  AMPICILLIN: SIGNIFICANT CHANGE UP
-  AMPICILLIN: SIGNIFICANT CHANGE UP
-  CIPROFLOXACIN: SIGNIFICANT CHANGE UP
-  CIPROFLOXACIN: SIGNIFICANT CHANGE UP
-  LEVOFLOXACIN: SIGNIFICANT CHANGE UP
-  LEVOFLOXACIN: SIGNIFICANT CHANGE UP
-  NITROFURANTOIN: SIGNIFICANT CHANGE UP
-  NITROFURANTOIN: SIGNIFICANT CHANGE UP
-  TETRACYCLINE: SIGNIFICANT CHANGE UP
-  TETRACYCLINE: SIGNIFICANT CHANGE UP
-  VANCOMYCIN: SIGNIFICANT CHANGE UP
-  VANCOMYCIN: SIGNIFICANT CHANGE UP
ALBUMIN SERPL ELPH-MCNC: 3.7 G/DL — SIGNIFICANT CHANGE UP (ref 3.3–5)
ALBUMIN SERPL ELPH-MCNC: 3.7 G/DL — SIGNIFICANT CHANGE UP (ref 3.3–5)
ALP SERPL-CCNC: 73 U/L — SIGNIFICANT CHANGE UP (ref 40–120)
ALP SERPL-CCNC: 73 U/L — SIGNIFICANT CHANGE UP (ref 40–120)
ALT FLD-CCNC: 42 U/L — SIGNIFICANT CHANGE UP (ref 10–45)
ALT FLD-CCNC: 42 U/L — SIGNIFICANT CHANGE UP (ref 10–45)
ANION GAP SERPL CALC-SCNC: 13 MMOL/L — SIGNIFICANT CHANGE UP (ref 5–17)
ANION GAP SERPL CALC-SCNC: 13 MMOL/L — SIGNIFICANT CHANGE UP (ref 5–17)
APTT BLD: 75.5 SEC — HIGH (ref 24.5–35.6)
APTT BLD: 75.5 SEC — HIGH (ref 24.5–35.6)
AST SERPL-CCNC: 20 U/L — SIGNIFICANT CHANGE UP (ref 10–40)
AST SERPL-CCNC: 20 U/L — SIGNIFICANT CHANGE UP (ref 10–40)
AUTO DIFF PNL BLD: NEGATIVE — SIGNIFICANT CHANGE UP
AUTO DIFF PNL BLD: NEGATIVE — SIGNIFICANT CHANGE UP
BASOPHILS # BLD AUTO: 0.07 K/UL — SIGNIFICANT CHANGE UP (ref 0–0.2)
BASOPHILS # BLD AUTO: 0.07 K/UL — SIGNIFICANT CHANGE UP (ref 0–0.2)
BASOPHILS NFR BLD AUTO: 0.6 % — SIGNIFICANT CHANGE UP (ref 0–2)
BASOPHILS NFR BLD AUTO: 0.6 % — SIGNIFICANT CHANGE UP (ref 0–2)
BILIRUB SERPL-MCNC: 0.5 MG/DL — SIGNIFICANT CHANGE UP (ref 0.2–1.2)
BILIRUB SERPL-MCNC: 0.5 MG/DL — SIGNIFICANT CHANGE UP (ref 0.2–1.2)
BLD GP AB SCN SERPL QL: NEGATIVE — SIGNIFICANT CHANGE UP
BLD GP AB SCN SERPL QL: NEGATIVE — SIGNIFICANT CHANGE UP
BUN SERPL-MCNC: 33 MG/DL — HIGH (ref 7–23)
BUN SERPL-MCNC: 33 MG/DL — HIGH (ref 7–23)
C-ANCA SER-ACNC: NEGATIVE — SIGNIFICANT CHANGE UP
C-ANCA SER-ACNC: NEGATIVE — SIGNIFICANT CHANGE UP
CALCIUM SERPL-MCNC: 9.8 MG/DL — SIGNIFICANT CHANGE UP (ref 8.4–10.5)
CALCIUM SERPL-MCNC: 9.8 MG/DL — SIGNIFICANT CHANGE UP (ref 8.4–10.5)
CHLORIDE SERPL-SCNC: 97 MMOL/L — SIGNIFICANT CHANGE UP (ref 96–108)
CHLORIDE SERPL-SCNC: 97 MMOL/L — SIGNIFICANT CHANGE UP (ref 96–108)
CO2 SERPL-SCNC: 19 MMOL/L — LOW (ref 22–31)
CO2 SERPL-SCNC: 19 MMOL/L — LOW (ref 22–31)
CREAT SERPL-MCNC: 1.52 MG/DL — HIGH (ref 0.5–1.3)
CREAT SERPL-MCNC: 1.52 MG/DL — HIGH (ref 0.5–1.3)
CULTURE RESULTS: ABNORMAL
CULTURE RESULTS: ABNORMAL
EGFR: 52 ML/MIN/1.73M2 — LOW
EGFR: 52 ML/MIN/1.73M2 — LOW
EOSINOPHIL # BLD AUTO: 0.38 K/UL — SIGNIFICANT CHANGE UP (ref 0–0.5)
EOSINOPHIL # BLD AUTO: 0.38 K/UL — SIGNIFICANT CHANGE UP (ref 0–0.5)
EOSINOPHIL NFR BLD AUTO: 3.4 % — SIGNIFICANT CHANGE UP (ref 0–6)
EOSINOPHIL NFR BLD AUTO: 3.4 % — SIGNIFICANT CHANGE UP (ref 0–6)
GLUCOSE BLDC GLUCOMTR-MCNC: 132 MG/DL — HIGH (ref 70–99)
GLUCOSE BLDC GLUCOMTR-MCNC: 132 MG/DL — HIGH (ref 70–99)
GLUCOSE BLDC GLUCOMTR-MCNC: 147 MG/DL — HIGH (ref 70–99)
GLUCOSE BLDC GLUCOMTR-MCNC: 147 MG/DL — HIGH (ref 70–99)
GLUCOSE BLDC GLUCOMTR-MCNC: 155 MG/DL — HIGH (ref 70–99)
GLUCOSE BLDC GLUCOMTR-MCNC: 155 MG/DL — HIGH (ref 70–99)
GLUCOSE BLDC GLUCOMTR-MCNC: 168 MG/DL — HIGH (ref 70–99)
GLUCOSE BLDC GLUCOMTR-MCNC: 168 MG/DL — HIGH (ref 70–99)
GLUCOSE SERPL-MCNC: 165 MG/DL — HIGH (ref 70–99)
GLUCOSE SERPL-MCNC: 165 MG/DL — HIGH (ref 70–99)
HCT VFR BLD CALC: 34.5 % — LOW (ref 39–50)
HCT VFR BLD CALC: 34.5 % — LOW (ref 39–50)
HGB BLD-MCNC: 11.7 G/DL — LOW (ref 13–17)
HGB BLD-MCNC: 11.7 G/DL — LOW (ref 13–17)
IMM GRANULOCYTES NFR BLD AUTO: 0.5 % — SIGNIFICANT CHANGE UP (ref 0–0.9)
IMM GRANULOCYTES NFR BLD AUTO: 0.5 % — SIGNIFICANT CHANGE UP (ref 0–0.9)
INR BLD: 1.17 RATIO — SIGNIFICANT CHANGE UP (ref 0.85–1.18)
INR BLD: 1.17 RATIO — SIGNIFICANT CHANGE UP (ref 0.85–1.18)
LYMPHOCYTES # BLD AUTO: 1.97 K/UL — SIGNIFICANT CHANGE UP (ref 1–3.3)
LYMPHOCYTES # BLD AUTO: 1.97 K/UL — SIGNIFICANT CHANGE UP (ref 1–3.3)
LYMPHOCYTES # BLD AUTO: 17.8 % — SIGNIFICANT CHANGE UP (ref 13–44)
LYMPHOCYTES # BLD AUTO: 17.8 % — SIGNIFICANT CHANGE UP (ref 13–44)
MAGNESIUM SERPL-MCNC: 1.9 MG/DL — SIGNIFICANT CHANGE UP (ref 1.6–2.6)
MAGNESIUM SERPL-MCNC: 1.9 MG/DL — SIGNIFICANT CHANGE UP (ref 1.6–2.6)
MCHC RBC-ENTMCNC: 29.5 PG — SIGNIFICANT CHANGE UP (ref 27–34)
MCHC RBC-ENTMCNC: 29.5 PG — SIGNIFICANT CHANGE UP (ref 27–34)
MCHC RBC-ENTMCNC: 33.9 GM/DL — SIGNIFICANT CHANGE UP (ref 32–36)
MCHC RBC-ENTMCNC: 33.9 GM/DL — SIGNIFICANT CHANGE UP (ref 32–36)
MCV RBC AUTO: 86.9 FL — SIGNIFICANT CHANGE UP (ref 80–100)
MCV RBC AUTO: 86.9 FL — SIGNIFICANT CHANGE UP (ref 80–100)
METHOD TYPE: SIGNIFICANT CHANGE UP
METHOD TYPE: SIGNIFICANT CHANGE UP
MONOCYTES # BLD AUTO: 0.7 K/UL — SIGNIFICANT CHANGE UP (ref 0–0.9)
MONOCYTES # BLD AUTO: 0.7 K/UL — SIGNIFICANT CHANGE UP (ref 0–0.9)
MONOCYTES NFR BLD AUTO: 6.3 % — SIGNIFICANT CHANGE UP (ref 2–14)
MONOCYTES NFR BLD AUTO: 6.3 % — SIGNIFICANT CHANGE UP (ref 2–14)
NEUTROPHILS # BLD AUTO: 7.88 K/UL — HIGH (ref 1.8–7.4)
NEUTROPHILS # BLD AUTO: 7.88 K/UL — HIGH (ref 1.8–7.4)
NEUTROPHILS NFR BLD AUTO: 71.4 % — SIGNIFICANT CHANGE UP (ref 43–77)
NEUTROPHILS NFR BLD AUTO: 71.4 % — SIGNIFICANT CHANGE UP (ref 43–77)
NRBC # BLD: 0 /100 WBCS — SIGNIFICANT CHANGE UP (ref 0–0)
NRBC # BLD: 0 /100 WBCS — SIGNIFICANT CHANGE UP (ref 0–0)
ORGANISM # SPEC MICROSCOPIC CNT: ABNORMAL
P-ANCA SER-ACNC: NEGATIVE — SIGNIFICANT CHANGE UP
P-ANCA SER-ACNC: NEGATIVE — SIGNIFICANT CHANGE UP
PHOSPHATE SERPL-MCNC: 3.3 MG/DL — SIGNIFICANT CHANGE UP (ref 2.5–4.5)
PHOSPHATE SERPL-MCNC: 3.3 MG/DL — SIGNIFICANT CHANGE UP (ref 2.5–4.5)
PHOSPHOLIPASE A2 RECEPTOR ELISA: <1.8 RU/ML — SIGNIFICANT CHANGE UP (ref 0–19.9)
PHOSPHOLIPASE A2 RECEPTOR ELISA: <1.8 RU/ML — SIGNIFICANT CHANGE UP (ref 0–19.9)
PLATELET # BLD AUTO: 294 K/UL — SIGNIFICANT CHANGE UP (ref 150–400)
PLATELET # BLD AUTO: 294 K/UL — SIGNIFICANT CHANGE UP (ref 150–400)
POTASSIUM SERPL-MCNC: 5 MMOL/L — SIGNIFICANT CHANGE UP (ref 3.5–5.3)
POTASSIUM SERPL-MCNC: 5 MMOL/L — SIGNIFICANT CHANGE UP (ref 3.5–5.3)
POTASSIUM SERPL-SCNC: 5 MMOL/L — SIGNIFICANT CHANGE UP (ref 3.5–5.3)
POTASSIUM SERPL-SCNC: 5 MMOL/L — SIGNIFICANT CHANGE UP (ref 3.5–5.3)
PROT SERPL-MCNC: 7.6 G/DL — SIGNIFICANT CHANGE UP (ref 6–8.3)
PROT SERPL-MCNC: 7.6 G/DL — SIGNIFICANT CHANGE UP (ref 6–8.3)
PROTHROM AB SERPL-ACNC: 12.8 SEC — SIGNIFICANT CHANGE UP (ref 9.5–13)
PROTHROM AB SERPL-ACNC: 12.8 SEC — SIGNIFICANT CHANGE UP (ref 9.5–13)
RBC # BLD: 3.97 M/UL — LOW (ref 4.2–5.8)
RBC # BLD: 3.97 M/UL — LOW (ref 4.2–5.8)
RBC # FLD: 14.3 % — SIGNIFICANT CHANGE UP (ref 10.3–14.5)
RBC # FLD: 14.3 % — SIGNIFICANT CHANGE UP (ref 10.3–14.5)
RH IG SCN BLD-IMP: POSITIVE — SIGNIFICANT CHANGE UP
RH IG SCN BLD-IMP: POSITIVE — SIGNIFICANT CHANGE UP
SODIUM SERPL-SCNC: 129 MMOL/L — LOW (ref 135–145)
SODIUM SERPL-SCNC: 129 MMOL/L — LOW (ref 135–145)
SPECIMEN SOURCE: SIGNIFICANT CHANGE UP
SPECIMEN SOURCE: SIGNIFICANT CHANGE UP
VANCOMYCIN FLD-MCNC: 15.2 UG/ML — SIGNIFICANT CHANGE UP
VANCOMYCIN FLD-MCNC: 15.2 UG/ML — SIGNIFICANT CHANGE UP
VANCOMYCIN TROUGH SERPL-MCNC: 9.5 UG/ML — LOW (ref 10–20)
VANCOMYCIN TROUGH SERPL-MCNC: 9.5 UG/ML — LOW (ref 10–20)
WBC # BLD: 11.06 K/UL — HIGH (ref 3.8–10.5)
WBC # BLD: 11.06 K/UL — HIGH (ref 3.8–10.5)
WBC # FLD AUTO: 11.06 K/UL — HIGH (ref 3.8–10.5)
WBC # FLD AUTO: 11.06 K/UL — HIGH (ref 3.8–10.5)

## 2023-11-22 PROCEDURE — 99232 SBSQ HOSP IP/OBS MODERATE 35: CPT | Mod: GC

## 2023-11-22 PROCEDURE — 71045 X-RAY EXAM CHEST 1 VIEW: CPT | Mod: 26

## 2023-11-22 PROCEDURE — 93010 ELECTROCARDIOGRAM REPORT: CPT

## 2023-11-22 PROCEDURE — 99291 CRITICAL CARE FIRST HOUR: CPT | Mod: 25

## 2023-11-22 PROCEDURE — 99292 CRITICAL CARE ADDL 30 MIN: CPT

## 2023-11-22 PROCEDURE — 99232 SBSQ HOSP IP/OBS MODERATE 35: CPT

## 2023-11-22 RX ORDER — HYDRALAZINE HCL 50 MG
100 TABLET ORAL THREE TIMES A DAY
Refills: 0 | Status: DISCONTINUED | OUTPATIENT
Start: 2023-11-22 | End: 2023-12-05

## 2023-11-22 RX ORDER — ISOSORBIDE DINITRATE 5 MG/1
40 TABLET ORAL THREE TIMES A DAY
Refills: 0 | Status: DISCONTINUED | OUTPATIENT
Start: 2023-11-22 | End: 2023-12-05

## 2023-11-22 RX ORDER — CARVEDILOL PHOSPHATE 80 MG/1
25 CAPSULE, EXTENDED RELEASE ORAL EVERY 12 HOURS
Refills: 0 | Status: DISCONTINUED | OUTPATIENT
Start: 2023-11-22 | End: 2023-12-25

## 2023-11-22 RX ORDER — VANCOMYCIN HCL 1 G
1000 VIAL (EA) INTRAVENOUS EVERY 12 HOURS
Refills: 0 | Status: DISCONTINUED | OUTPATIENT
Start: 2023-11-22 | End: 2023-11-24

## 2023-11-22 RX ORDER — MAGNESIUM SULFATE 500 MG/ML
2 VIAL (ML) INJECTION ONCE
Refills: 0 | Status: COMPLETED | OUTPATIENT
Start: 2023-11-22 | End: 2023-11-22

## 2023-11-22 RX ADMIN — CARVEDILOL PHOSPHATE 25 MILLIGRAM(S): 80 CAPSULE, EXTENDED RELEASE ORAL at 05:53

## 2023-11-22 RX ADMIN — Medication 1: at 08:16

## 2023-11-22 RX ADMIN — Medication 100 MILLIGRAM(S): at 22:15

## 2023-11-22 RX ADMIN — ISOSORBIDE DINITRATE 40 MILLIGRAM(S): 5 TABLET ORAL at 15:48

## 2023-11-22 RX ADMIN — Medication 1: at 12:46

## 2023-11-22 RX ADMIN — ISOSORBIDE DINITRATE 40 MILLIGRAM(S): 5 TABLET ORAL at 05:54

## 2023-11-22 RX ADMIN — HEPARIN SODIUM 14.5 UNIT(S)/HR: 5000 INJECTION INTRAVENOUS; SUBCUTANEOUS at 14:23

## 2023-11-22 RX ADMIN — HEPARIN SODIUM 14.5 UNIT(S)/HR: 5000 INJECTION INTRAVENOUS; SUBCUTANEOUS at 04:00

## 2023-11-22 RX ADMIN — ISOSORBIDE DINITRATE 40 MILLIGRAM(S): 5 TABLET ORAL at 12:07

## 2023-11-22 RX ADMIN — Medication 8 UNIT(S): at 17:13

## 2023-11-22 RX ADMIN — Medication 81 MILLIGRAM(S): at 12:07

## 2023-11-22 RX ADMIN — Medication 250 MILLIGRAM(S): at 17:46

## 2023-11-22 RX ADMIN — Medication 100 MILLIGRAM(S): at 14:07

## 2023-11-22 RX ADMIN — ATORVASTATIN CALCIUM 80 MILLIGRAM(S): 80 TABLET, FILM COATED ORAL at 22:15

## 2023-11-22 RX ADMIN — CARVEDILOL PHOSPHATE 25 MILLIGRAM(S): 80 CAPSULE, EXTENDED RELEASE ORAL at 17:55

## 2023-11-22 RX ADMIN — Medication 25 GRAM(S): at 05:57

## 2023-11-22 RX ADMIN — HEPARIN SODIUM 14.5 UNIT(S)/HR: 5000 INJECTION INTRAVENOUS; SUBCUTANEOUS at 08:17

## 2023-11-22 RX ADMIN — AMIODARONE HYDROCHLORIDE 200 MILLIGRAM(S): 400 TABLET ORAL at 05:54

## 2023-11-22 RX ADMIN — Medication 8 UNIT(S): at 12:49

## 2023-11-22 RX ADMIN — INSULIN GLARGINE 12 UNIT(S): 100 INJECTION, SOLUTION SUBCUTANEOUS at 22:10

## 2023-11-22 RX ADMIN — Medication 8 UNIT(S): at 08:16

## 2023-11-22 RX ADMIN — Medication 100 MILLIGRAM(S): at 05:53

## 2023-11-22 RX ADMIN — HEPARIN SODIUM 14.5 UNIT(S)/HR: 5000 INJECTION INTRAVENOUS; SUBCUTANEOUS at 19:00

## 2023-11-22 RX ADMIN — SPIRONOLACTONE 25 MILLIGRAM(S): 25 TABLET, FILM COATED ORAL at 05:54

## 2023-11-22 NOTE — PROGRESS NOTE ADULT - SUBJECTIVE AND OBJECTIVE BOX
LD VALENCIA  58y Male  MRN:[id number]    Patient is a 58y old  Male who presents with a chief complaint of NSTEMI (01 Nov 2023 20:29)    HPI:  59yo M w/ hx HTN, CAD w/ 1 stent in 2009, ICH (2008) presenting with abn ekg. Patient presented to [facility] where he was found to have STEMI, recommended to get cath however patient did not want to get it there so it left and came here.  Patient initially had cough, congestion, fever, was placed on antibiotics on Sunday.  Started feeling nauseous and had a presyncopal event after which he presented to ED last night.  Had chest pain as well.  Chest pain is midsternal.  Not currently having chest pain.  Received 4 aspirin 30 min pta. (01 Nov 2023 15:11)      Patient seen and evaluated at bedside in CCU. interval events noted    Interval HPI: remain in ccu. IABP in place        PAST MEDICAL & SURGICAL HISTORY:  HTN (hypertension)      CAD (coronary artery disease)  2009; stent      Intracranial hemorrhage  2008      Respiratory arrest  december 1st      Myocardial infarction, unspecified MI type, unspecified artery      History of coronary artery stent placement          REVIEW OF SYSTEMS:  as per hpi     VITALS:   ICU Vital Signs Last 24 Hrs  T(C): 36.4 (22 Nov 2023 08:00), Max: 36.8 (21 Nov 2023 15:00)  T(F): 97.6 (22 Nov 2023 08:00), Max: 98.3 (21 Nov 2023 15:00)  HR: 74 (22 Nov 2023 11:00) (70 - 82)  BP: --  BP(mean): --  ABP: --  ABP(mean): --  RR: 22 (22 Nov 2023 11:00) (17 - 50)  SpO2: 98% (22 Nov 2023 11:00) (96% - 100%)    O2 Parameters below as of 22 Nov 2023 09:00  Patient On (Oxygen Delivery Method): room air            PHYSICAL EXAM:  GENERAL: NAD, comfortable   HEAD:  Atraumatic, Normocephalic  EYES: EOMI, PERRLA, conjunctiva and sclera clear  NECK: Supple, No JVD   CHEST/LUNG: Clear to auscultation bilaterally; No wheeze  HEART: Regular rate and rhythm; No murmurs, rubs, or gallops  ABDOMEN: Soft, Nontender, Nondistended; Bowel sounds present  EXTREMITIES:  2+ Peripheral Pulses, No clubbing, cyanosis, or edema  NEUROLOGY: nonfocal  SKIN: No rashes or lesions  +iabp    Consultant(s) Notes Reviewed:  [x ] YES  [ ] NO  Care Discussed with Consultants/Other Providers [ x] YES  [ ] NO    MEDS:   MEDICATIONS  (STANDING):  aMIOdarone    Tablet   Oral   aMIOdarone    Tablet 200 milliGRAM(s) Oral daily  aspirin  chewable 81 milliGRAM(s) Oral daily  atorvastatin 80 milliGRAM(s) Oral at bedtime  budesonide  80 MICROgram(s)/formoterol 4.5 MICROgram(s) Inhaler 2 Puff(s) Inhalation two times a day  carvedilol 25 milliGRAM(s) Oral every 12 hours  chlorhexidine 2% Cloths 1 Application(s) Topical daily  dextrose 50% Injectable 12.5 Gram(s) IV Push once  dextrose 50% Injectable 25 Gram(s) IV Push once  heparin  Infusion 1600 Unit(s)/Hr (14.5 mL/Hr) IV Continuous <Continuous>  hydrALAZINE 100 milliGRAM(s) Oral three times a day  insulin glargine Injectable (LANTUS) 12 Unit(s) SubCutaneous at bedtime  insulin lispro (ADMELOG) corrective regimen sliding scale   SubCutaneous three times a day before meals  insulin lispro (ADMELOG) corrective regimen sliding scale   SubCutaneous at bedtime  insulin lispro Injectable (ADMELOG) 8 Unit(s) SubCutaneous three times a day before meals  isosorbide   dinitrate Tablet (ISORDIL) 40 milliGRAM(s) Oral three times a day  polyethylene glycol 3350 17 Gram(s) Oral two times a day  senna 2 Tablet(s) Oral at bedtime  spironolactone 25 milliGRAM(s) Oral daily  vancomycin  IVPB 1500 milliGRAM(s) IV Intermittent every 24 hours    MEDICATIONS  (PRN):  hydrOXYzine hydrochloride 25 milliGRAM(s) Oral two times a day PRN Anxiety      ALLERGIES:  penicillins (Unknown)      LABS:                                                                                11.7   11.06 )-----------( 294      ( 22 Nov 2023 04:16 )             34.5   11-22    129<L>  |  97  |  33<H>  ----------------------------<  165<H>  5.0   |  19<L>  |  1.52<H>    Ca    9.8      22 Nov 2023 04:16  Phos  3.3     11-22  Mg     1.9     11-22    TPro  7.6  /  Alb  3.7  /  TBili  0.5  /  DBili  x   /  AST  20  /  ALT  42  /  AlkPhos  73  11-22      < from: Colonoscopy (11.17.23 @ 10:35) >                                                                                                        Impression:          - The entire examined colon is normal on direct and retroflexion views.                       - No specimens collected.  Recommendation:      - Resume previous diet today.                       - No large polyps or masses detected, No objection from GI standpoint to                 proceed with heart transplantation/advanced heart therapies.                       - Please call back should any further questions or concerns arise.    < end of copied text >     < from: Xray Chest 1 View- PORTABLE-Urgent (11.01.23 @ 07:42) >    IMPRESSION:  Clear lungs.    ---End of Report ---        < end of copied text >  < from: TTE W or WO Ultrasound Enhancing Agent (11.01.23 @ 10:23) >  _____________________________     CONCLUSIONS:      1. Left ventricular cavity is moderately dilated. Left ventricular wall thickness is normal. Left ventricular systolic function is severely decreased with an ejection fraction of 32 % by Chinchilla's method of disks. Regional wall motion abnormalities present.   2. Multiple segmental abnormalities exist. See findings.   3. There is moderate (grade 2) left ventricular diastolic dysfunction, with indeterminant filling pressure.   4. Normal right ventricular cavity size, wall thickness, and systolic function.   5. No significant valvular disease.   6. No pericardial effusion seen.   7. Compared to the transthoracic echocardiogram performed on 1/25/2017 the areas of akinesis are unchanged but there has been a decline in LV systolic function with new areas of hypokinesis.    __________________________________________________________________    < end of copied text >  < from: TTE Limited W or WO Ultrasound Enhancing Agent (11.02.23 @ 07:41) >  __________________________     CONCLUSIONS:      1. After obtaining consent, Definity ultrasound enhancing agent was given for enhanced left ventricular opacification and improved delineation of the left ventricular endocardial borders. Left ventricular systolic function is severely decreased with a calculated ejection fraction of 22 % by the Chinchilla's biplane method of disks. There is a left ventricular thrombus.   2. Findings were discussed with Litzy BOSS on 11/2/2023 at 8.49am.   3. There is a left ventricular thrombus.    _________________________________________________________________    < end of copied text >

## 2023-11-22 NOTE — PROGRESS NOTE ADULT - SUBJECTIVE AND OBJECTIVE BOX
DEVORAH VALENCIA  MRN-[id number]  Patient is a 58y old  Male who presents with a chief complaint of advanced cardiac support (22 Nov 2023 18:56)    HPI:  pt seen and approx 1:30 pm  in [facility]    59yo M w/ hx HTN, CAD w/ 1 stent in 2009, ICH (2008) presenting with abn ekg. Patient presented to [facility] where he was found to have STEMI, recommended to get cath however patient did not want to get it there so it left and came here.  Patient initially had cough, congestion, fever, was placed on antibiotics on Sunday.  Started feeling nauseous and had a presyncopal event after which he presented to ED last night.  Had chest pain as well.  Chest pain is midsternal.  Not currently having chest pain.  Received 4 aspirin 30 min pta. (01 Nov 2023 15:11)    24 Hours: pt listed today OHT 2    REVIEW OF SYSTEMS:    CONSTITUTIONAL: No weakness, fevers or chills  EYES/ENT: No visual changes;  No vertigo or throat pain   NECK: No pain or stiffness  RESPIRATORY: No cough, wheezing, hemoptysis; No shortness of breath  CARDIOVASCULAR: No chest pain or palpitations  GASTROINTESTINAL: No abdominal or epigastric pain. No nausea, vomiting, or hematemesis; No diarrhea or constipation. No melena or hematochezia.  GENITOURINARY: No dysuria, frequency or hematuria  NEUROLOGICAL: No numbness or weakness  SKIN: No itching, rashes      Physical Exam:  Vital Signs Last 24 Hrs  T(C): 36.7 (22 Nov 2023 16:00), Max: 36.9 (22 Nov 2023 12:00)  T(F): 98.1 (22 Nov 2023 16:00), Max: 98.5 (22 Nov 2023 12:00)  HR: 75 (22 Nov 2023 18:00) (70 - 78)  BP: --  BP(mean): --  RR: 20 (22 Nov 2023 18:00) (17 - 36)  SpO2: 99% (22 Nov 2023 18:00) (96% - 99%)    Parameters below as of 22 Nov 2023 17:00  Patient On (Oxygen Delivery Method): room air    ============================I/O===========================   I&O's Detail    21 Nov 2023 07:01  -  22 Nov 2023 07:00  --------------------------------------------------------  IN:    Heparin: 344.5 mL    IV PiggyBack: 50 mL    Oral Fluid: 1650 mL  Total IN: 2044.5 mL    OUT:    Voided (mL): 2900 mL  Total OUT: 2900 mL    Total NET: -855.5 mL      22 Nov 2023 07:01  -  22 Nov 2023 20:05  --------------------------------------------------------  IN:    Heparin: 174 mL    Oral Fluid: 560 mL  Total IN: 734 mL    OUT:    Voided (mL): 1400 mL  Total OUT: 1400 mL    Total NET: -666 mL        ============================ LABS =========================                        11.7   11.06 )-----------( 294      ( 22 Nov 2023 04:16 )             34.5     11-22    129<L>  |  97  |  33<H>  ----------------------------<  165<H>  5.0   |  19<L>  |  1.52<H>    Ca    9.8      22 Nov 2023 04:16  Phos  3.3     11-22  Mg     1.9     11-22    TPro  7.6  /  Alb  3.7  /  TBili  0.5  /  DBili  x   /  AST  20  /  ALT  42  /  AlkPhos  73  11-22    LIVER FUNCTIONS - ( 22 Nov 2023 04:16 )  Alb: 3.7 g/dL / Pro: 7.6 g/dL / ALK PHOS: 73 U/L / ALT: 42 U/L / AST: 20 U/L / GGT: x           PT/INR - ( 22 Nov 2023 04:17 )   PT: 12.8 sec;   INR: 1.17 ratio         PTT - ( 22 Nov 2023 04:17 )  PTT:75.5 sec    Urinalysis Basic - ( 22 Nov 2023 04:16 )    Color: x / Appearance: x / SG: x / pH: x  Gluc: 165 mg/dL / Ketone: x  / Bili: x / Urobili: x   Blood: x / Protein: x / Nitrite: x   Leuk Esterase: x / RBC: x / WBC x   Sq Epi: x / Non Sq Epi: x / Bacteria: x      ======================Micro/Rad/Cardio=================  Culture: Reviewed   CXR: Reviewed  Echo:Reviewed  ======================================================  PAST MEDICAL & SURGICAL HISTORY:  HTN (hypertension)      CAD (coronary artery disease)  2009; stent      Intracranial hemorrhage  2008      Respiratory arrest  december 1st      Myocardial infarction, unspecified MI type, unspecified artery      History of coronary artery stent placement      ====================ASSESSMENT ==============  58 male with HTN, CAD (s/p PCI 2008), HFrEF, CVA 2018, and T2DM presenting with chest pressure and unknown tachycardia that was shocked x1, [facility] 11/1 found to have in-stent restenosis of pLAD and  of RCA with elevated RA and PA pressures and severely decreased EF 32%, admitted to CICU for management of cardiogenic shock requiring IABP 11/1 -11/7, with hospital course c/b vfib arrest requiring reinsertion of IABP. Currently undergoing workup for AT evaluation with HF.    Plan:  ====================== NEUROLOGY=====================  # Anxiety  - No Seroquel/antipsychotics since vfib arrest and prolonged QTC  - Psych Eval last week, recommended SSRI, but pt. refused   - psych recommending atarax PRN  - continue to monitor mental status  - continue band exercised while on bedrest s/t IABP    ==================== RESPIRATORY======================  # Acute Hypoxemic Respiratory Failure  - s/p x2 intubations for cardiogenic pulm edema and the in setting of cardiac arrest, resolved - extubated 11/10  - Currently on room air with spO2 mid 90s  - Continue incentive spirometry and monitoring of sp02    # Asthma  - c/w albuterol, symbicort and spiriva  - on trelegy at home    ====================CARDIOVASCULAR===================  # Vfib arrest i/s/o ischemia  - Lido gtt off 11/13  - PO Amio load - total of 5g per EP complete 11/17, continue PO amio  - Keep K > 4, Mag > 2.2     # Cardiogenic shock requiring IABP (11/1- 11/7, 11/9-)  - Likely 2/2 NSTEMI and ADHF  - 11/1 LHC: pLAD 100 % in-stent restenosis & mRCA, 100 %. PCWP 30. IABP placed.  - 11/1 TTE: LV dilated. EF 32 %. Regional WMAs present, mod (grade 2) LV diastolic dysfunction  - 11/2 TTE: EF 22% and + LV thrombus  - PRN Bumex IVP as needed  - IABP swapped 11/20 to RFA, continue 1:1 support  - Off Milrinone gtt @ 7:30 am 11/11  - Currently on hydralazine 100 TID and ISD 40 TID for AL reduction  - Continue romel 25 daily and coreg 25 BID for GDMT  - Listed OHT status 2 11/22, F/u HF recs    # NSTEMI iso stent re-occlusion of pLAD and 100%  of RCA  - EKG on admission w/LBBB  - DAPT: c/w ASA, Brilinta d/c'd per transplant w/u  - c/w lipitor 80  - cMR deferred given necessity of IABP  - CT sx not recommending CABG, undergoing AT eval    # LV thrombus  - c/w heparin gtt    ===================== RENAL =========================  # Non-oliguric ARIC   - sCr stable, Baseline Cr: 1-1.22  - renal US - no evidence of renal artery stenosis  - Trend BMP, lytes daily, replace as needed  - Continue Strict I/Os, avoid nephrotoxins    ==================== GASTROINTESTINAL===================  - DASH diet  - Continue to monitor for BM    ===================HEMATOLOGIC/ONC ===================  # VTE prophylaxis   - c/w heparin gtt given LV Thrombus and IABP   - H/H and platelets stable  - Continue to monitor daily      =======================    ENDOCRINE  =====================  # Type 2 DM  - A1c 8.3, glucose   - Continue lantus, premeal, low ISS    ========================INFECTIOUS DISEASE================  # Enterococcus faecalis bacteremia  - Concern for aspiration event prior to intubation on admission - s/p vanco and cefepime course  - BCx 11/10 NGTD, RVP neg, MRSA neg, UA neg  - BCx 11/17+ for enterococcus, started on Vanco (by level) 11/18  - BCx 11/18 NGTD, urine cx + enterococcus  - IABP site swapped to LRFA 11/20  - continue vancomycin by level (11/18-  - CT A/P negative for infectious pathology  - f/u transplant ID recomendations    # COVID, resolved  - Off airborne precautions 11/11  # Pre-transplant ID w/u   - Trend ID recs for serologies   - Colonoscopy 11/17 - normal   - Chest CT 11/17 - improved LLL aeration      LINES:   RICANDY Roberts 11/9-11/14, RAroberto Villasenor 11/9-11/18, LFA IABP 11/9 -11/20, RFA IABP 11/20-    Ethics/Dispo: full code, maintain in CICU      Patient requires continuous monitoring with bedside rhythm monitoring, arterial line, pulse oximetry, ventilator monitoring and intermittent blood gas analysis.  Care plan discussed with ICU care team.  Patient remained critical; required more than usual post op care; I have spent 35 minutes providing non-routine post op care, revaluated multiple times during the day.

## 2023-11-22 NOTE — PROGRESS NOTE ADULT - PROBLEM SELECTOR PLAN 2
Pt has hyponatremia that was developed during the hospital course.   He is not on any diuretics except spironolactone. He is not symptomatic. Urine Osm 471 and Urine Na 88.     PLAN:  liberal salt intake for now ( to be discussed with cardiology)  Fluid restriction to less than 1 - 1.5 L a day.  Trend and monitor.     Wait for the final reccs.   If you have any questions, please feel free to contact me  Ramon Isaac  Nephrology Fellow  [phone]; Prefer Teams.  (After 5pm or on weekends please page the on-call fellow).

## 2023-11-22 NOTE — PROGRESS NOTE ADULT - ASSESSMENT
====================ASSESSMENT ==============  58 male with HTN, CAD (s/p PCI 2008), HFrEF, CVA 2018, and T2DM presenting with chest pressure and unknown tachycardia that was shocked x1, [facility] 11/1 found to have in-stent restenosis of pLAD and  of RCA with elevated RA and PA pressures and severely decreased EF 32%, admitted to CICU for management of cardiogenic shock requiring IABP 11/1 -11/7, with hospital course c/b vfib arrest requiring reinsertion of IABP. Currently undergoing workup for AT evaluation with HF.    Plan:  ====================== NEUROLOGY=====================  # Anxiety  - No Seroquel/antipsychotics since vfib arrest and prolonged QTC  - Psych Eval last week, recommended SSRI, but pt. refused   - psych recommending atarax PRN  - continue to monitor mental status  - continue band exercised while on bedrest s/t IABP    ==================== RESPIRATORY======================  # Acute Hypoxemic Respiratory Failure  - s/p x2 intubations for cardiogenic pulm edema and the in setting of cardiac arrest, resolved - extubated 11/10  - Currently on room air with spO2 mid 90s  - Continue incentive spirometry and monitoring of sp02    # Asthma  - c/w albuterol, symbicort and spiriva  - on trelegy at home    ====================CARDIOVASCULAR===================  # Vfib arrest i/s/o ischemia  - Lido gtt off 11/13  - PO Amio load - total of 5g per EP complete 11/17, continue PO amio  - Keep K > 4, Mag > 2.2     # Cardiogenic shock requiring IABP (11/1- 11/7, 11/9-)  - Likely 2/2 NSTEMI and ADHF  - 11/1 LHC: pLAD 100 % in-stent restenosis & mRCA, 100 %. PCWP 30. IABP placed.  - 11/1 TTE: LV dilated. EF 32 %. Regional WMAs present, mod (grade 2) LV diastolic dysfunction  - 11/2 TTE: EF 22% and + LV thrombus  - PRN Bumex IVP as needed,   - IABP swapped 11/20 to RFA, continue 1:1 support  - Off Milrinone gtt @ 7:30 am 11/11  - Currently on hydralazine 100 TID and ISD 40 TID for AL reduction  - Continue romel 25 daily and coreg 25 BID for GDMT  - AT Eval, F/u HF recs    # NSTEMI iso stent re-occlusion of pLAD and 100%  of RCA  - EKG on admission w/LBBB  - DAPT: c/w ASA, Brilinta d/c'd per transplant w/u  - c/w lipitor 80  - cMR deferred given necessity of IABP  - CT sx not recommending CABG, undergoing AT eval    # LV thrombus  - c/w heparin gtt    ===================== RENAL =========================  # Non-oliguric ARIC   - sCr stable, Baseline Cr: 1-1.22  - renal US - no evidence of renal artery stenosis  - Trend BMP, lytes daily, replace as needed  - Continue Strict I/Os, avoid nephrotoxins    ==================== GASTROINTESTINAL===================  - DASH diet  - Continue to monitor for BM    ===================HEMATOLOGIC/ONC ===================  # VTE prophylaxis   - c/w heparin gtt given LV Thrombus and IABP   - H/H and platelets stable  - Continue to monitor daily      =======================    ENDOCRINE  =====================  # Type 2 DM  - A1c 8.3, glucose   - Continue lantus, premeal, low ISS    ========================INFECTIOUS DISEASE================  # Enterococcus faecalis bacteremia  - Concern for aspiration event prior to intubation on admission - s/p vanco and cefepime course  - BCx 11/10 NGTD, RVP neg, MRSA neg, UA neg  - BCx 11/17+ for enterococcus, started on Vanco (by level) 11/18  - BCx 11/18 NGTD, urine cx + enterococcus  - IABP site swapped to LRFA 11/20  - continue vancomycin by level (11/18-  - CT A/P negative for infectious pathology  - f/u transplant ID recomendations    # COVID, resolved  - Off airborne precautions 11/11  # Pre-transplant ID w/u   - Trend ID recs for serologies   - Colonoscopy 11/17 - normal   - Chest CT 11/17 - improved LLL aeration  - f/u abdominal US    LINES:   ANAY Roberts 11/9-11/14, RAroberto Villasenor 11/9-11/18, LFA IABP 11/9 -11/20, RFA IABP 11/20-    Ethics/Dispo: full code, maintain in CICU       ====================ASSESSMENT ==============  58 male with HTN, CAD (s/p PCI 2008), HFrEF, CVA 2018, and T2DM presenting with chest pressure and unknown tachycardia that was shocked x1, [facility] 11/1 found to have in-stent restenosis of pLAD and  of RCA with elevated RA and PA pressures and severely decreased EF 32%, admitted to CICU for management of cardiogenic shock requiring IABP 11/1 -11/7, with hospital course c/b vfib arrest requiring reinsertion of IABP. Currently undergoing workup for AT evaluation with HF.    Plan:  ====================== NEUROLOGY=====================  # Anxiety  - No Seroquel/antipsychotics since vfib arrest and prolonged QTC  - Psych Eval last week, recommended SSRI, but pt. refused   - psych recommending atarax PRN  - continue to monitor mental status  - continue band exercised while on bedrest s/t IABP    ==================== RESPIRATORY======================  # Acute Hypoxemic Respiratory Failure  - s/p x2 intubations for cardiogenic pulm edema and the in setting of cardiac arrest, resolved - extubated 11/10  - Currently on room air with spO2 mid 90s  - Continue incentive spirometry and monitoring of sp02    # Asthma  - c/w albuterol, symbicort and spiriva  - on trelegy at home    ====================CARDIOVASCULAR===================  # Vfib arrest i/s/o ischemia  - Lido gtt off 11/13  - PO Amio load - total of 5g per EP complete 11/17, continue PO amio  - Keep K > 4, Mag > 2.2     # Cardiogenic shock requiring IABP (11/1- 11/7, 11/9-)  - Likely 2/2 NSTEMI and ADHF  - 11/1 LHC: pLAD 100 % in-stent restenosis & mRCA, 100 %. PCWP 30. IABP placed.  - 11/1 TTE: LV dilated. EF 32 %. Regional WMAs present, mod (grade 2) LV diastolic dysfunction  - 11/2 TTE: EF 22% and + LV thrombus  - PRN Bumex IVP as needed  - IABP swapped 11/20 to RFA, continue 1:1 support  - Off Milrinone gtt @ 7:30 am 11/11  - Currently on hydralazine 100 TID and ISD 40 TID for AL reduction  - Continue romel 25 daily and coreg 25 BID for GDMT  - AT Eval, F/u HF recs    # NSTEMI iso stent re-occlusion of pLAD and 100%  of RCA  - EKG on admission w/LBBB  - DAPT: c/w ASA, Brilinta d/c'd per transplant w/u  - c/w lipitor 80  - cMR deferred given necessity of IABP  - CT sx not recommending CABG, undergoing AT eval    # LV thrombus  - c/w heparin gtt    ===================== RENAL =========================  # Non-oliguric ARIC   - sCr stable, Baseline Cr: 1-1.22  - renal US - no evidence of renal artery stenosis  - Trend BMP, lytes daily, replace as needed  - Continue Strict I/Os, avoid nephrotoxins    ==================== GASTROINTESTINAL===================  - DASH diet  - Continue to monitor for BM    ===================HEMATOLOGIC/ONC ===================  # VTE prophylaxis   - c/w heparin gtt given LV Thrombus and IABP   - H/H and platelets stable  - Continue to monitor daily      =======================    ENDOCRINE  =====================  # Type 2 DM  - A1c 8.3, glucose   - Continue lantus, premeal, low ISS    ========================INFECTIOUS DISEASE================  # Enterococcus faecalis bacteremia  - Concern for aspiration event prior to intubation on admission - s/p vanco and cefepime course  - BCx 11/10 NGTD, RVP neg, MRSA neg, UA neg  - BCx 11/17+ for enterococcus, started on Vanco (by level) 11/18  - BCx 11/18 NGTD, urine cx + enterococcus  - IABP site swapped to LRFA 11/20  - continue vancomycin by level (11/18-  - CT A/P negative for infectious pathology  - f/u transplant ID recomendations    # COVID, resolved  - Off airborne precautions 11/11  # Pre-transplant ID w/u   - Trend ID recs for serologies   - Colonoscopy 11/17 - normal   - Chest CT 11/17 - improved LLL aeration      LINES:   ANAY Roberts 11/9-11/14, Rosanne Villasenor 11/9-11/18, LFA IABP 11/9 -11/20, RFA IABP 11/20-    Ethics/Dispo: full code, maintain in CICU

## 2023-11-22 NOTE — PROGRESS NOTE ADULT - ASSESSMENT
57 yo M with HFrEF (LVIDd 6.4 cm, LVEF 32%), CAD s/p PCI (2008), HTN, DMT2 (A1c 8.3%) and CVA s/p TPA (2018), recently treated for PNA who initially presented to [facility] via EMS after syncope reportedly requiring defibrilation. Treated for ACS but left AMA to come to [facility]. On arrival ECG with ST depression in lateral leads. Intubated 11/1 for respiratory failure. Found to have COVID. L/RHC 11/1revealing  of LAD and RCA, elevated filling pressures and CI of 1.5 prompting placement of IABP. Extubated 11/3. IABP was weaned to off 11/7, then the following day on 11/8, developed PMVT cardiac arrest with prompt CPR and defibrillation, started on IV Lidocaine and Amio. IABP ultimately replaced on 11/9 for worsening hypotension. TTE 11/11 revealing LV thrombus.     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 AT evaluation launched 11/10, he is ABO A. Currently well supported on IABP at 1:1 without further arrhythmias.  He was discussed during TSC on 11/16 and was approved for listing PENDING hepatology clearance, colonoscopy and repeat chest CT to assess PNA. He developed leukocytosis and worsening kidney function and was found to be bacteremic with GPC in pairs and chains on 11/17. Repeat cultures from 11/18 reveal NGTD. He's currently afebrile with downtrended, now plateaued mild leukocytosis on vanc. He is hemodynamically stable with MAPS 70-80s on IABP 1:1 on high dose oral vaosdilators/GDMT. His Cr is stable. He appears euvolemic on exam with net negative fluid balance over the past 24 hours off diuretics.     Cardiac Studies  11/21/23 TTE: limited unable to rule out endocarditis, technically difficult study  11/1123 TTE: LVEF 22% (global), LV thrombus, normal RV size and function, mild MR, trace TR  11/1/23  LHC showed pLAD 70 % stenosis in the ostium portion of the segment and 100 % in-stent restenosis and in mRCA, 100 % stenosis.   11/1/23 RHC; RA 23 Vw 24, PA 52/33/40, PCWP 30 Vw 33, AO 85/66/73, PA sat 54.4%, CO/CI (F) 2.96/1.44, IABP was placed during the cath through R common femoral artery.   11/1/23 TTE: LVIDd 6.4cm , LVEF 32%, regional WMA, grade II DD,  TAPSE 1.7cm, mld MR, trace TR,     Bedside hemodynamics  11/3: IABP 1:1 RA 5; PA 27/12/18; CO/CI 5.6/2.6; MAP 90-100s.  11/4/23 IABP 1:3 CVP 4 PA 37/18 SvO2 83.8 CO/CI 11.2 CI 5.1  (in the setting of fever to 38.3C)  11/5 IABP 1:1 CVP 3 PA 48/27 SvO2 78.4% CO 8.2 CI 3.7   11/1 (IABP 1:1): CVP 1, PA 33/5/16, MvO2 74.3%

## 2023-11-22 NOTE — PROGRESS NOTE ADULT - NSPROGADDITIONALINFOA_GEN_ALL_CORE
59yo M HTN CAD prior stroke DM, HFrEF admitted with cardiogenic and now septic shock with E Faecalis/ staph epi bacteremia. Undergoing txp eval.    Neuro: no acute deficits  CV: Cardiogenic shock on IABP, off milrinone. Exchanged IABP and cont PO afterload reduction. Cont PO amio for VT. Continue with ASA  Resp: on RA  GI: DASH DM diet. needs hepatology consult  Renal: Non-oliguric ARIC that has been improving  Heme: H/H low but acceptable on Heparin drip for LV thrombus + IABP  ID: Afebrile, on abx, ID is following (will need clearance prior to transplant after 72h negative blood cultures). Urine positive for entercoccus  Endo: Sugars overall controlled on Lantus  Lines: RFA IABP 11/20

## 2023-11-22 NOTE — PROGRESS NOTE ADULT - SUBJECTIVE AND OBJECTIVE BOX
PATIENT:  DEVORAH VALENCIA  [id number]    CHIEF COMPLAINT:  Patient is a 58y old  Male who presents with a chief complaint of CHF pre advanced therapies work up (21 Nov 2023 18:18)      INTERVAL HISTORYOVERNIGHT EVENTS:      REVIEW OF SYSTEMS:    Constitutional:     [ ] negative [ ] fevers [ ] chills [ ] weight loss [ ] weight gain  HEENT:                  [ ] negative [ ] dry eyes [ ] eye irritation [ ] postnasal drip [ ] nasal congestion  CV:                         [ ] negative  [ ] chest pain [ ] orthopnea [ ] palpitations [ ] murmur  Resp:                     [ ] negative [ ] cough [ ] shortness of breath [ ] dyspnea [ ] wheezing [ ] sputum [ ] hemoptysis  GI:                          [ ] negative [ ] nausea [ ] vomiting [ ] diarrhea [ ] constipation [ ] abd pain [ ] dysphagia   :                        [ ] negative [ ] dysuria [ ] nocturia [ ] hematuria [ ] increased urinary frequency  Musculoskeletal: [ ] negative [ ] back pain [ ] myalgias [ ] arthralgias [ ] fracture  Skin:                       [ ] negative [ ] rash [ ] itch  Neurological:        [ ] negative [ ] headache [ ] dizziness [ ] syncope [ ] weakness [ ] numbness  Psychiatric:           [ ] negative [ ] anxiety [ ] depression  Endocrine:            [ ] negative [ ] diabetes [ ] thyroid problem  Heme/Lymph:      [ ] negative [ ] anemia [ ] bleeding problem  Allergic/Immune: [ ] negative [ ] itchy eyes [ ] nasal discharge [ ] hives [ ] angioedema    [ ] All other systems negative  [ ] Unable to assess ROS because ________.    MEDICATIONS:  MEDICATIONS  (STANDING):  aMIOdarone    Tablet 200 milliGRAM(s) Oral daily  aMIOdarone    Tablet   Oral   aspirin  chewable 81 milliGRAM(s) Oral daily  atorvastatin 80 milliGRAM(s) Oral at bedtime  budesonide  80 MICROgram(s)/formoterol 4.5 MICROgram(s) Inhaler 2 Puff(s) Inhalation two times a day  carvedilol 25 milliGRAM(s) Oral every 12 hours  chlorhexidine 2% Cloths 1 Application(s) Topical daily  dextrose 50% Injectable 25 Gram(s) IV Push once  dextrose 50% Injectable 12.5 Gram(s) IV Push once  heparin  Infusion 1600 Unit(s)/Hr (14.5 mL/Hr) IV Continuous <Continuous>  hydrALAZINE 100 milliGRAM(s) Oral three times a day  insulin glargine Injectable (LANTUS) 12 Unit(s) SubCutaneous at bedtime  insulin lispro (ADMELOG) corrective regimen sliding scale   SubCutaneous three times a day before meals  insulin lispro (ADMELOG) corrective regimen sliding scale   SubCutaneous at bedtime  insulin lispro Injectable (ADMELOG) 8 Unit(s) SubCutaneous three times a day before meals  isosorbide   dinitrate Tablet (ISORDIL) 40 milliGRAM(s) Oral three times a day  polyethylene glycol 3350 17 Gram(s) Oral two times a day  senna 2 Tablet(s) Oral at bedtime  spironolactone 25 milliGRAM(s) Oral daily  vancomycin  IVPB 1500 milliGRAM(s) IV Intermittent every 24 hours    MEDICATIONS  (PRN):  hydrOXYzine hydrochloride 25 milliGRAM(s) Oral two times a day PRN Anxiety      ALLERGIES:  Allergies    penicillins (Unknown)    Intolerances        OBJECTIVE:  ICU Vital Signs Last 24 Hrs  T(C): 36.8 (22 Nov 2023 03:00), Max: 36.8 (21 Nov 2023 11:00)  T(F): 98.2 (22 Nov 2023 03:00), Max: 98.3 (21 Nov 2023 15:00)  HR: 72 (22 Nov 2023 04:00) (68 - 82)  BP: --  BP(mean): --  ABP: --  ABP(mean): --  RR: 21 (22 Nov 2023 04:00) (11 - 57)  SpO2: 97% (22 Nov 2023 04:00) (97% - 100%)    O2 Parameters below as of 22 Nov 2023 03:00  Patient On (Oxygen Delivery Method): room air            Adult Advanced Hemodynamics Last 24 Hrs  CVP(mm Hg): --  CVP(cm H2O): --  CO: --  CI: --  PA: --  PA(mean): --  PCWP: --  SVR: --  SVRI: --  PVR: --  PVRI: --  CAPILLARY BLOOD GLUCOSE      POCT Blood Glucose.: 171 mg/dL (21 Nov 2023 21:29)  POCT Blood Glucose.: 247 mg/dL (21 Nov 2023 17:05)  POCT Blood Glucose.: 180 mg/dL (21 Nov 2023 12:46)  POCT Blood Glucose.: 172 mg/dL (21 Nov 2023 08:53)    CAPILLARY BLOOD GLUCOSE      POCT Blood Glucose.: 171 mg/dL (21 Nov 2023 21:29)    I&O's Summary    20 Nov 2023 07:01  -  21 Nov 2023 07:00  --------------------------------------------------------  IN: 1704 mL / OUT: 1450 mL / NET: 254 mL    21 Nov 2023 07:01  -  22 Nov 2023 06:41  --------------------------------------------------------  IN: 1711 mL / OUT: 2450 mL / NET: -739 mL      Daily     Daily     PHYSICAL EXAMINATION:  General: WN/WD NAD  HEENT: PERRLA, EOMI, moist mucous membranes  Neurology: A&Ox3, nonfocal, MOISE x 4  Respiratory: CTA B/L, normal respiratory effort, no wheezes, crackles, rales  CV: RRR, S1S2, no murmurs, rubs or gallops  Abdominal: Soft, NT, ND +BS, Last BM  Extremities: No edema, + peripheral pulses  Incisions:   Tubes:    LABS:                          11.7   11.06 )-----------( 294      ( 22 Nov 2023 04:16 )             34.5     11-22    129<L>  |  97  |  33<H>  ----------------------------<  165<H>  5.0   |  19<L>  |  1.52<H>    Ca    9.8      22 Nov 2023 04:16  Phos  3.3     11-22  Mg     1.9     11-22    TPro  7.6  /  Alb  3.7  /  TBili  0.5  /  DBili  x   /  AST  20  /  ALT  42  /  AlkPhos  73  11-22    LIVER FUNCTIONS - ( 22 Nov 2023 04:16 )  Alb: 3.7 g/dL / Pro: 7.6 g/dL / ALK PHOS: 73 U/L / ALT: 42 U/L / AST: 20 U/L / GGT: x           PT/INR - ( 22 Nov 2023 04:17 )   PT: 12.8 sec;   INR: 1.17 ratio    PTT - ( 22 Nov 2023 04:17 )  PTT:75.5 sec    Urinalysis Basic - ( 22 Nov 2023 04:16 )    Color: x / Appearance: x / SG: x / pH: x  Gluc: 165 mg/dL / Ketone: x  / Bili: x / Urobili: x   Blood: x / Protein: x / Nitrite: x   Leuk Esterase: x / RBC: x / WBC x   Sq Epi: x / Non Sq Epi: x / Bacteria: x        TELEMETRY:     EKG:     IMAGING:       PATIENT:  DEVORAH VALENCIA  [id number]    CHIEF COMPLAINT:  Patient is a 58y old  Male who presents with a chief complaint of CHF pre advanced therapies work up (21 Nov 2023 18:18)      INTERVAL HISTORY/OVERNIGHT EVENTS: no acute overnight events      REVIEW OF SYSTEMS:    Constitutional:     [x ] negative [ ] fevers [ ] chills [ ] weight loss [ ] weight gain  HEENT:                  [x ] negative [ ] dry eyes [ ] eye irritation [ ] postnasal drip [ ] nasal congestion  CV:                         [x ] negative  [ ] chest pain [ ] orthopnea [ ] palpitations [ ] murmur  Resp:                     [ x] negative [ ] cough [ ] shortness of breath [ ] dyspnea [ ] wheezing [ ] sputum [ ] hemoptysis  GI:                          [x ] negative [ ] nausea [ ] vomiting [ ] diarrhea [ ] constipation [ ] abd pain [ ] dysphagia   :                        [x ] negative [ ] dysuria [ ] nocturia [ ] hematuria [ ] increased urinary frequency  Musculoskeletal: [x ] negative [ ] back pain [ ] myalgias [ ] arthralgias [ ] fracture  Skin:                       [ x] negative [ ] rash [ ] itch  Neurological:        [x ] negative [ ] headache [ ] dizziness [ ] syncope [ ] weakness [ ] numbness      [ ] All other systems negative  [ ] Unable to assess ROS because ________.    MEDICATIONS:  MEDICATIONS  (STANDING):  aMIOdarone    Tablet 200 milliGRAM(s) Oral daily  aMIOdarone    Tablet   Oral   aspirin  chewable 81 milliGRAM(s) Oral daily  atorvastatin 80 milliGRAM(s) Oral at bedtime  budesonide  80 MICROgram(s)/formoterol 4.5 MICROgram(s) Inhaler 2 Puff(s) Inhalation two times a day  carvedilol 25 milliGRAM(s) Oral every 12 hours  chlorhexidine 2% Cloths 1 Application(s) Topical daily  dextrose 50% Injectable 25 Gram(s) IV Push once  dextrose 50% Injectable 12.5 Gram(s) IV Push once  heparin  Infusion 1600 Unit(s)/Hr (14.5 mL/Hr) IV Continuous <Continuous>  hydrALAZINE 100 milliGRAM(s) Oral three times a day  insulin glargine Injectable (LANTUS) 12 Unit(s) SubCutaneous at bedtime  insulin lispro (ADMELOG) corrective regimen sliding scale   SubCutaneous three times a day before meals  insulin lispro (ADMELOG) corrective regimen sliding scale   SubCutaneous at bedtime  insulin lispro Injectable (ADMELOG) 8 Unit(s) SubCutaneous three times a day before meals  isosorbide   dinitrate Tablet (ISORDIL) 40 milliGRAM(s) Oral three times a day  polyethylene glycol 3350 17 Gram(s) Oral two times a day  senna 2 Tablet(s) Oral at bedtime  spironolactone 25 milliGRAM(s) Oral daily  vancomycin  IVPB 1500 milliGRAM(s) IV Intermittent every 24 hours    MEDICATIONS  (PRN):  hydrOXYzine hydrochloride 25 milliGRAM(s) Oral two times a day PRN Anxiety      ALLERGIES:  Allergies    penicillins (Unknown)    Intolerances        OBJECTIVE:  ICU Vital Signs Last 24 Hrs  T(C): 36.8 (22 Nov 2023 03:00), Max: 36.8 (21 Nov 2023 11:00)  T(F): 98.2 (22 Nov 2023 03:00), Max: 98.3 (21 Nov 2023 15:00)  HR: 72 (22 Nov 2023 04:00) (68 - 82)  BP: --  BP(mean): --  ABP: --  ABP(mean): --  RR: 21 (22 Nov 2023 04:00) (11 - 57)  SpO2: 97% (22 Nov 2023 04:00) (97% - 100%)    O2 Parameters below as of 22 Nov 2023 03:00  Patient On (Oxygen Delivery Method): room air            Adult Advanced Hemodynamics Last 24 Hrs  CVP(mm Hg): --  CVP(cm H2O): --  CO: --  CI: --  PA: --  PA(mean): --  PCWP: --  SVR: --  SVRI: --  PVR: --  PVRI: --  CAPILLARY BLOOD GLUCOSE      POCT Blood Glucose.: 171 mg/dL (21 Nov 2023 21:29)  POCT Blood Glucose.: 247 mg/dL (21 Nov 2023 17:05)  POCT Blood Glucose.: 180 mg/dL (21 Nov 2023 12:46)  POCT Blood Glucose.: 172 mg/dL (21 Nov 2023 08:53)    CAPILLARY BLOOD GLUCOSE      POCT Blood Glucose.: 171 mg/dL (21 Nov 2023 21:29)    I&O's Summary    20 Nov 2023 07:01  -  21 Nov 2023 07:00  --------------------------------------------------------  IN: 1704 mL / OUT: 1450 mL / NET: 254 mL    21 Nov 2023 07:01  -  22 Nov 2023 06:41  --------------------------------------------------------  IN: 1711 mL / OUT: 2450 mL / NET: -739 mL      Daily     Daily     PHYSICAL EXAMINATION:  General: WN/WD NAD  HEENT: PERRLA, EOMI, moist mucous membranes  Neurology: A&Ox3, nonfocal, MOISE x 4  Respiratory: CTA B/L, normal respiratory effort, no wheezes, crackles, rales  CV: RRR, S1S2, no murmurs, rubs or gallops  Abdominal: Soft, NT, ND +BS  Extremities: No edema, + palpable DP and radial pulses  Skin: warm, dry  Lines: R femoral IABP dressing clean, dry and intact    LABS:                          11.7   11.06 )-----------( 294      ( 22 Nov 2023 04:16 )             34.5     11-22    129<L>  |  97  |  33<H>  ----------------------------<  165<H>  5.0   |  19<L>  |  1.52<H>    Ca    9.8      22 Nov 2023 04:16  Phos  3.3     11-22  Mg     1.9     11-22    TPro  7.6  /  Alb  3.7  /  TBili  0.5  /  DBili  x   /  AST  20  /  ALT  42  /  AlkPhos  73  11-22    LIVER FUNCTIONS - ( 22 Nov 2023 04:16 )  Alb: 3.7 g/dL / Pro: 7.6 g/dL / ALK PHOS: 73 U/L / ALT: 42 U/L / AST: 20 U/L / GGT: x           PT/INR - ( 22 Nov 2023 04:17 )   PT: 12.8 sec;   INR: 1.17 ratio    PTT - ( 22 Nov 2023 04:17 )  PTT:75.5 sec    Urinalysis Basic - ( 22 Nov 2023 04:16 )    Color: x / Appearance: x / SG: x / pH: x  Gluc: 165 mg/dL / Ketone: x  / Bili: x / Urobili: x   Blood: x / Protein: x / Nitrite: x   Leuk Esterase: x / RBC: x / WBC x   Sq Epi: x / Non Sq Epi: x / Bacteria: x        TELEMETRY:     EKG:     IMAGING:

## 2023-11-22 NOTE — PROGRESS NOTE ADULT - ASSESSMENT
58 yrs old male w/ hx of HFrEF (LVIDd 6.4 cm, LVEF 32%), CAD s/p PCI (2008), HTN, DMT2 (A1c 8.3%) and CVA s/p TPA (2018), recently treated for PNA who initially presented to [facility] via EMS after syncope reportedly requiring defibrilation. Treated for ACS but left AMA to come to [facility]. Pt has covid and was placed on IABP for hypotension. Now being evaluated for Heart transplant Vs LVAD placement. Nephrology consulted for ARIC

## 2023-11-22 NOTE — PROGRESS NOTE ADULT - NS ATTEND AMEND GEN_ALL_CORE FT
Patient supported on IABP 1:1, no inotropes at this time  Patient has been cleared by hepatology and transplant ID  Will be activated on the list for transplant today

## 2023-11-22 NOTE — PROGRESS NOTE ADULT - ATTENDING COMMENTS
57 yo man ARIC  Hx HFrEF (EF 32%), CAD s/p PCI (2008) DMT2 (A1c 8.3%) and CVA   Recently treated for PNA   Now syncope reportedly requiring defibrillation. Treated for ACS   Pt has covid and was placed on IABP for hypotension.   Being evaluated for Heart transplant - LVAD placement.  Renal function stable- will optimize  Scr stable again- lytes good    1.52 good UO

## 2023-11-22 NOTE — PROGRESS NOTE ADULT - PROBLEM SELECTOR PLAN 1
- L IABP at 1:1, placed 11/9. On AC with Heparin infusion (also for LV thrombus); s/p exchange to LFA given high grade bacteremia on 11/20  - Currently on Coreg 25 mg BID hold for MAP <65  - Continue HDZN 100 mg TID and ISDN 40 mg TID, hold for MAP <65  - Continue Spironolactone 25 mg QD  - PRN diuretics to target net even/- 500 fluid balance  - AT evaluation: launched 11/10. Accepted for transplant pending ID clearance for listing. ABO A  GI on board, s/p bedside colonoscopy; No masses or polyps found. Cleared from GI standpoint.   Appreciate cardiorenal c/s   As per hepatology, US Elastography demonstrates minimal risk of clinically significant fibrosis.   11/20 Abd US/doppler demonstrates patency of hepatic and portal veins, no renal artery stenosis. Awaiting hepatology input.    S/p CT chest with improved LLL aeration.    cPRA 0% 11/13  Last Brilinta dose was on 11/13  Will need TxID clearance prior to listing  - Please keep primary Dr. Banuelos [phone] in the loop per family request.

## 2023-11-22 NOTE — PROGRESS NOTE ADULT - PROBLEM SELECTOR PLAN 1
ARIC in the setting of heart failure, COVID infection. At the time of presentation Scr is 1.25. Started to worsen on 11/4 and peaked at 4.07 11/10 and gradually improved to 1.8 on 11/18. Pt had LHC on 11/1. He has hx of DM with proteinuria of 684 but most recent UA negative. Primary team is planning to get cardiac transplant eval. Currently nonoliguric, UOP of 1.2L. BETZAIDA, HIV, Hep B, Hep C - negative. A1c - 8.3. Complements are not low. BETZAIDA and ANCA negative. Light chains - not significant. UPCR <0.1, Phos, Anti GBM abs - negative. UPEP negative.   Duplex - normal, symmetric blood flow throughout both kidneys. Velocities and RIs are limited by IABP.       PLAN:  No acute intervention from nephrology point of view is indicated. Scr stable at 1.52 today.   Pt is spiranolactone - K is 5.0  Pending serological workup - PLA2R Ab, Anti Ds DNA, immunofixation,    Avoid nephrotoxic agents. Dose meds per eGFR.

## 2023-11-22 NOTE — PROGRESS NOTE ADULT - ASSESSMENT
59 yo M with PMHx of HTN, CAD w/ 1 stent in 2009, ICH (2008) presented on 11/1 with abn ekg. Patient presented to [facility] where he was found to have STEMI, recommended to get cath however patient did not want to get it there so he left and came here.   EKG here with ST depression in lateral leads and elevation in anterior leads   Prior to [facility] found to be tachycardic, dyspneic, intubated   [facility] 11/1 with chronic total occlusion of LAD and RCA, with elevated RA and PA pressures  TTE 11/1 with severely decreased EF 32%, s/p IABP 11/1    RVP (11/1) Positive for COVID19  RVP (11/10) Negative  BCx (11/2, 11/4) NGTD  ETT Culture (11/2) NGTD  MRSA/MSSA PCR (11/2, 11/10) Negative  UA (11/10) 1 WBC    CXR (11/10) Clear Lungs  TTE (11/2) Left ventricular thrombus.    #Enterococcus Bacteremia, Leukocytosis, Pre-Heart Transplant Evaluation Serologies  Concern for either central line or abdominal source  CT A/P Noncontrast (11/18) No acute process  Given mixed cultures with Staph epi and Enterococcus faecalis would continue Vancomycin   Patient's penicillin allergy is questionable and patient does not recall reaction.   Anticipate we can utilize Ampicillin in the future with close monitoring  -- removal/exchange of Balloon Pump done on 11/21  --Continue Vancomycin 1.5g IV Q24H. Check trough prior to third dose. Target trough 15-20  --Continue to monitor renal function and vancomycin levels to avoid nephrotoxicity / ototoxicity secondary to vancomycin level 11/21 therapeutic range  --Continue to follow CBC with diff  --Continue to follow temperature curve  --Follow up on susceptibility of Enterococcus faecalis shows Vanco and Ampi sensitive  --Follow up on repeat blood cultures from 11/18 are negative  -- TTE to look for vegetations unable to r/o endocarditis but no vegetations seen    Given evidence of clearing of bacteremia could move forward from an ID perspective to pursue advanced cardiac therapies- listed for transplant status 2E      #Encounter to Vaccinate Patient  COVID19: COVID19 Infection This Admission. Would consider Vaccination in ~3 months  Influenza: declined  Pneumococcal: Would benefit from PCV20  HAV: recommend Havrix  HBV: recommend Heplisav  MMR: Not Mumps Immune, if >1 month until transplant would consider MMR dose  Varicella: Immune  Shingles: recommend Shingrix series  Tdap: recommend Tdap    Chao Peters MD  Can be called via Teams  After 5pm/weekends [phone]   57 yo M with PMHx of HTN, CAD w/ 1 stent in 2009, ICH (2008) presented on 11/1 with abn ekg. Patient presented to [facility] where he was found to have STEMI, recommended to get cath however patient did not want to get it there so he left and came here.   EKG here with ST depression in lateral leads and elevation in anterior leads   Prior to [facility] found to be tachycardic, dyspneic, intubated   [facility] 11/1 with chronic total occlusion of LAD and RCA, with elevated RA and PA pressures  TTE 11/1 with severely decreased EF 32%, s/p IABP 11/1    RVP (11/1) Positive for COVID19  RVP (11/10) Negative  BCx (11/2, 11/4) NGTD  ETT Culture (11/2) NGTD  MRSA/MSSA PCR (11/2, 11/10) Negative  UA (11/10) 1 WBC    CXR (11/10) Clear Lungs  TTE (11/2) Left ventricular thrombus.    #Enterococcus Bacteremia, Leukocytosis, Pre-Heart Transplant Evaluation Serologies  Concern for either central line or abdominal source  CT A/P Noncontrast (11/18) No acute process  Given mixed cultures with Staph epi and Enterococcus faecalis would continue Vancomycin   Patient's penicillin allergy is questionable and patient does not recall reaction.   Anticipate we can utilize Ampicillin in the future with close monitoring  -- removal/exchange of Balloon Pump done on 11/21  --Continue Vancomycin 1.5g IV Q24H. Check trough prior to third dose. Target trough 15-20  most recent specimen sent was not a true trough would repeat just prior to the next dose  --Continue to monitor renal function and vancomycin levels to avoid nephrotoxicity / ototoxicity secondary to vancomycin level 11/21 therapeutic range  --Continue to follow CBC with diff  --Continue to follow temperature curve  --Follow up on susceptibility of Enterococcus faecalis shows Vanco and Ampi sensitive  --Follow up on repeat blood cultures from 11/18 are negative  -- TTE to look for vegetations unable to r/o endocarditis but no vegetations seen    Given evidence of clearing of bacteremia could move forward from an ID perspective to pursue advanced cardiac therapies- listed for transplant status 2E      #Encounter to Vaccinate Patient  COVID19: COVID19 Infection This Admission. Would consider Vaccination in ~3 months  Influenza: declined  Pneumococcal: Would benefit from PCV20  HAV: recommend Havrix  HBV: recommend Heplisav  MMR: Not Mumps Immune, if >1 month until transplant would consider MMR dose  Varicella: Immune  Shingles: recommend Shingrix series  Tdap: recommend Tdap    Chao Peters MD  Can be called via Teams  After 5pm/weekends [phone]

## 2023-11-22 NOTE — PROGRESS NOTE ADULT - SUBJECTIVE AND OBJECTIVE BOX
[facility] Division of Kidney Diseases & Hypertension  FOLLOW UP NOTE  [phone]--------------------------------------------------------------------------------  Chief Complaint:Non-ST elevation myocardial infarction (NSTEMI)    24 hour events/subjective:  Seen in CCU at the bedside.   No acute overnight events. No fresh complaints. On IABP.   Denies any headaches, fevers/chills, chest pain, palpitations, SOB, and leg swelling.            PAST HISTORY  --------------------------------------------------------------------------------  No significant changes to PMH, PSH, FHx, SHx, unless otherwise noted    ALLERGIES & MEDICATIONS  --------------------------------------------------------------------------------  Allergies    penicillins (Unknown)    Intolerances      Standing Inpatient Medications  aMIOdarone    Tablet   Oral   aMIOdarone    Tablet 200 milliGRAM(s) Oral daily  aspirin  chewable 81 milliGRAM(s) Oral daily  atorvastatin 80 milliGRAM(s) Oral at bedtime  budesonide  80 MICROgram(s)/formoterol 4.5 MICROgram(s) Inhaler 2 Puff(s) Inhalation two times a day  carvedilol 25 milliGRAM(s) Oral every 12 hours  chlorhexidine 2% Cloths 1 Application(s) Topical daily  dextrose 50% Injectable 12.5 Gram(s) IV Push once  dextrose 50% Injectable 25 Gram(s) IV Push once  heparin  Infusion 1600 Unit(s)/Hr IV Continuous <Continuous>  hydrALAZINE 100 milliGRAM(s) Oral three times a day  insulin glargine Injectable (LANTUS) 12 Unit(s) SubCutaneous at bedtime  insulin lispro (ADMELOG) corrective regimen sliding scale   SubCutaneous at bedtime  insulin lispro (ADMELOG) corrective regimen sliding scale   SubCutaneous three times a day before meals  insulin lispro Injectable (ADMELOG) 8 Unit(s) SubCutaneous three times a day before meals  isosorbide   dinitrate Tablet (ISORDIL) 40 milliGRAM(s) Oral three times a day  polyethylene glycol 3350 17 Gram(s) Oral two times a day  senna 2 Tablet(s) Oral at bedtime  spironolactone 25 milliGRAM(s) Oral daily  vancomycin  IVPB 1500 milliGRAM(s) IV Intermittent every 24 hours    PRN Inpatient Medications  hydrOXYzine hydrochloride 25 milliGRAM(s) Oral two times a day PRN      REVIEW OF SYSTEMS  --------------------------------------------------------------------------------  As above.    VITALS/PHYSICAL EXAM  --------------------------------------------------------------------------------  T(C): 36.4 (11-22-23 @ 08:00), Max: 36.8 (11-21-23 @ 11:00)  HR: 75 (11-22-23 @ 08:00) (70 - 82)  BP: --  RR: 19 (11-22-23 @ 08:00) (18 - 50)  SpO2: 97% (11-22-23 @ 08:00) (96% - 100%)  Wt(kg): --        11-21-23 @ 07:01  -  11-22-23 @ 07:00  --------------------------------------------------------  IN: 2044.5 mL / OUT: 2900 mL / NET: -855.5 mL    11-22-23 @ 07:01  -  11-22-23 @ 09:21  --------------------------------------------------------  IN: 14.5 mL / OUT: 300 mL / NET: -285.5 mL      Physical Exam:  Gen: NAD  HEENT: ADAM  Pulm: CTA B/L  CV: S1S2 +IABP  Abd: Soft, +BS   Ext: No LE edema B/L  Neuro: Awake  Skin: Warm and dry  Vascular access: Rt RAMYA cisse      LABS/STUDIES  --------------------------------------------------------------------------------              11.7   11.06 >-----------<  294      [11-22-23 @ 04:16]              34.5     129  |  97  |  33  ----------------------------<  165      [11-22-23 @ 04:16]  5.0   |  19  |  1.52        Ca     9.8     [11-22-23 @ 04:16]      Mg     1.9     [11-22-23 @ 04:16]      Phos  3.3     [11-22-23 @ 04:16]    TPro  7.6  /  Alb  3.7  /  TBili  0.5  /  DBili  x   /  AST  20  /  ALT  42  /  AlkPhos  73  [11-22-23 @ 04:16]    PT/INR: PT 12.8 , INR 1.17       [11-22-23 @ 04:17]  PTT: 75.5       [11-22-23 @ 04:17]      Creatinine Trend:  SCr 1.52 [11-22 @ 04:16]  SCr 1.61 [11-21 @ 03:50]  SCr 1.79 [11-20 @ 00:09]  SCr 1.65 [11-19 @ 02:54]  SCr 1.73 [11-18 @ 13:59]    Urinalysis - [11-22-23 @ 04:16]      Color  / Appearance  / SG  / pH       Gluc 165 / Ketone   / Bili  / Urobili        Blood  / Protein  / Leuk Est  / Nitrite       RBC  / WBC  / Hyaline  / Gran  / Sq Epi  / Non Sq Epi  / Bacteria     Urine Creatinine 62      [11-21-23 @ 03:50]  Urine Protein <7      [11-21-23 @ 03:50]  Urine Sodium 88      [11-19-23 @ 04:24]  Urine Potassium 22      [11-19-23 @ 04:24]  Urine Phosphorus 39.3      [11-19-23 @ 04:24]  Urine Osmolality 431      [11-19-23 @ 04:24]        C3 Complement 171      [11-19-23 @ 02:54]  C4 Complement 45      [11-19-23 @ 02:54]  ANCA: cANCA Negative, pANCA Negative, atypical ANCA Negative      [11-19-23 @ 02:54]  anti-GBM <0.2      [11-19-23 @ 02:54]  Free Light Chains: kappa 3.76, lambda 3.25, ratio = 1.16      [11-19 @ 02:54]  Immunofixation Serum:   No Monoclonal Band Identified      Reference Range: None Detected      [11-19-23 @ 02:54]

## 2023-11-22 NOTE — PROGRESS NOTE ADULT - SUBJECTIVE AND OBJECTIVE BOX
INFECTIOUS DISEASES FOLLOW UP-- Courtney Peters  [phone]    This is a follow up note for this  58yMale with  Non-ST elevation myocardial infarction (NSTEMI)        ROS:  CONSTITUTIONAL:  No fever, good appetite  CARDIOVASCULAR:  No chest pain or palpitations  RESPIRATORY:  No dyspnea  GASTROINTESTINAL:  No nausea, vomiting, diarrhea, or abdominal pain  GENITOURINARY:  No dysuria  NEUROLOGIC:  No headache,     Allergies    penicillins (Unknown)    Intolerances        ANTIBIOTICS/RELEVANT:  antimicrobials  vancomycin  IVPB 1000 milliGRAM(s) IV Intermittent every 12 hours    immunologic:    OTHER:  aMIOdarone    Tablet 200 milliGRAM(s) Oral daily  aMIOdarone    Tablet   Oral   aspirin  chewable 81 milliGRAM(s) Oral daily  atorvastatin 80 milliGRAM(s) Oral at bedtime  budesonide  80 MICROgram(s)/formoterol 4.5 MICROgram(s) Inhaler 2 Puff(s) Inhalation two times a day  carvedilol 25 milliGRAM(s) Oral every 12 hours  chlorhexidine 2% Cloths 1 Application(s) Topical daily  dextrose 50% Injectable 25 Gram(s) IV Push once  dextrose 50% Injectable 12.5 Gram(s) IV Push once  heparin  Infusion 1600 Unit(s)/Hr IV Continuous <Continuous>  hydrALAZINE 100 milliGRAM(s) Oral three times a day  hydrOXYzine hydrochloride 25 milliGRAM(s) Oral two times a day PRN  insulin glargine Injectable (LANTUS) 12 Unit(s) SubCutaneous at bedtime  insulin lispro (ADMELOG) corrective regimen sliding scale   SubCutaneous at bedtime  insulin lispro (ADMELOG) corrective regimen sliding scale   SubCutaneous three times a day before meals  insulin lispro Injectable (ADMELOG) 8 Unit(s) SubCutaneous three times a day before meals  isosorbide   dinitrate Tablet (ISORDIL) 40 milliGRAM(s) Oral three times a day  polyethylene glycol 3350 17 Gram(s) Oral two times a day  senna 2 Tablet(s) Oral at bedtime  spironolactone 25 milliGRAM(s) Oral daily      Objective:  Vital Signs Last 24 Hrs  T(C): 36.7 (22 Nov 2023 16:00), Max: 36.9 (22 Nov 2023 12:00)  T(F): 98.1 (22 Nov 2023 16:00), Max: 98.5 (22 Nov 2023 12:00)  HR: 75 (22 Nov 2023 17:00) (70 - 79)  BP: --  BP(mean): --  RR: 18 (22 Nov 2023 17:00) (17 - 36)  SpO2: 99% (22 Nov 2023 17:00) (96% - 99%)    Parameters below as of 22 Nov 2023 17:00  Patient On (Oxygen Delivery Method): room air        PHYSICAL EXAM:  Constitutional:no acute distress  Eyes:MARVEL, EOMI  Ear/Nose/Throat: no oral lesions, 	  Respiratory: clear BL  Cardiovascular: S1S2  Gastrointestinal:soft, (+) BS, no tenderness  Extremities:no e/e/c  No Lymphadenopathy  IV sites not inflammed.    LABS:                        11.7   11.06 )-----------( 294      ( 22 Nov 2023 04:16 )             34.5     11-22    129<L>  |  97  |  33<H>  ----------------------------<  165<H>  5.0   |  19<L>  |  1.52<H>    Ca    9.8      22 Nov 2023 04:16  Phos  3.3     11-22  Mg     1.9     11-22    TPro  7.6  /  Alb  3.7  /  TBili  0.5  /  DBili  x   /  AST  20  /  ALT  42  /  AlkPhos  73  11-22    PT/INR - ( 22 Nov 2023 04:17 )   PT: 12.8 sec;   INR: 1.17 ratio         PTT - ( 22 Nov 2023 04:17 )  PTT:75.5 sec  Urinalysis Basic - ( 22 Nov 2023 04:16 )    Color: x / Appearance: x / SG: x / pH: x  Gluc: 165 mg/dL / Ketone: x  / Bili: x / Urobili: x   Blood: x / Protein: x / Nitrite: x   Leuk Esterase: x / RBC: x / WBC x   Sq Epi: x / Non Sq Epi: x / Bacteria: x        MICROBIOLOGY:            RECENT CULTURES:  11-18 @ 13:40  .Blood Blood-Peripheral  --  --  --    No growth at 72 Hours  --  11-18 @ 13:30  .Blood Blood-Peripheral  --  --  --    No growth at 72 Hours  --  11-18 @ 12:13  Clean Catch Clean Catch (Midstream)  Enterococcus faecalis  Enterococcus faecalis  SUNSHINE    10,000 - 49,000 CFU/mL Enterococcus faecalis  <10,000 CFU/ml Normal Urogenital kaitlynn present  --  11-17 @ 18:45  .Blood Blood  Staphylococcus epidermidis  Staphylococcus epidermidis  SUNSHINE    Growth in aerobic and anaerobic bottles: Enterococcus faecalis  See previous culture [id number]  Growth in aerobic bottle: Staphylococcus epidermidis  --  11-17 @ 18:37  .Blood Blood  Blood Culture PCR  Blood Culture PCR  Enterococcus faecalis  Blood Culture PCR  PCR    Growth in aerobic bottle: Enterococcus faecalis  Growth in anaerobic bottle: Staphylococcus epidermidis "Susceptibilities  not performed"  Direct identification is available within approximately 3-5  hours either by Blood Panel Multiplexed PCR or Direct  MALDI-TOF. Details: [email or web address]  --      RADIOLOGY & ADDITIONAL STUDIES:    < from: Xray Chest 1 View- PORTABLE-Routine (Xray Chest 1 View- PORTABLE-Routine in AM.) (11.22.23 @ 07:36) >  Frontal expiratory view of the chest shows the heart to be similar in   size. IABP marker projects 8 mm below the aortic knob.    The lungs are clear and there is no evidence of pneumothorax nor pleural   effusion.    IMPRESSION:  Marker as noted.    < end of copied text >  < from: US Abdomen Doppler (11.21.23 @ 07:44) >  Impression: Patent portal venous system with hepatopedal blood flow. No   portal venous thrombosis.    Patent hepatic veins.    Patent hepatic artery and branches.    < end of copied text >   INFECTIOUS DISEASES FOLLOW UP-- Courtney Peters  [phone]    This is a follow up note for this  58yMale with  Non-ST elevation myocardial infarction (NSTEMI)  listed for transplant        ROS:  CONSTITUTIONAL:  No fever, good appetite  CARDIOVASCULAR:  No chest pain or palpitations  RESPIRATORY:  No dyspnea  GASTROINTESTINAL:  No nausea, vomiting, diarrhea, or abdominal pain  GENITOURINARY:  No dysuria  NEUROLOGIC:  No headache,     Allergies    penicillins (Unknown)    Intolerances        ANTIBIOTICS/RELEVANT:  antimicrobials  vancomycin  IVPB 1000 milliGRAM(s) IV Intermittent every 12 hours    immunologic:    OTHER:  aMIOdarone    Tablet 200 milliGRAM(s) Oral daily  aMIOdarone    Tablet   Oral   aspirin  chewable 81 milliGRAM(s) Oral daily  atorvastatin 80 milliGRAM(s) Oral at bedtime  budesonide  80 MICROgram(s)/formoterol 4.5 MICROgram(s) Inhaler 2 Puff(s) Inhalation two times a day  carvedilol 25 milliGRAM(s) Oral every 12 hours  chlorhexidine 2% Cloths 1 Application(s) Topical daily  dextrose 50% Injectable 25 Gram(s) IV Push once  dextrose 50% Injectable 12.5 Gram(s) IV Push once  heparin  Infusion 1600 Unit(s)/Hr IV Continuous <Continuous>  hydrALAZINE 100 milliGRAM(s) Oral three times a day  hydrOXYzine hydrochloride 25 milliGRAM(s) Oral two times a day PRN  insulin glargine Injectable (LANTUS) 12 Unit(s) SubCutaneous at bedtime  insulin lispro (ADMELOG) corrective regimen sliding scale   SubCutaneous at bedtime  insulin lispro (ADMELOG) corrective regimen sliding scale   SubCutaneous three times a day before meals  insulin lispro Injectable (ADMELOG) 8 Unit(s) SubCutaneous three times a day before meals  isosorbide   dinitrate Tablet (ISORDIL) 40 milliGRAM(s) Oral three times a day  polyethylene glycol 3350 17 Gram(s) Oral two times a day  senna 2 Tablet(s) Oral at bedtime  spironolactone 25 milliGRAM(s) Oral daily      Objective:  Vital Signs Last 24 Hrs  T(C): 36.7 (22 Nov 2023 16:00), Max: 36.9 (22 Nov 2023 12:00)  T(F): 98.1 (22 Nov 2023 16:00), Max: 98.5 (22 Nov 2023 12:00)  HR: 75 (22 Nov 2023 17:00) (70 - 79)  BP: --  BP(mean): --  RR: 18 (22 Nov 2023 17:00) (17 - 36)  SpO2: 99% (22 Nov 2023 17:00) (96% - 99%)    Parameters below as of 22 Nov 2023 17:00  Patient On (Oxygen Delivery Method): room air        PHYSICAL EXAM:  Constitutional:no acute distress  Eyes:MARVEL, EOMI  Ear/Nose/Throat: no oral lesions, 	  Respiratory: clear BL  Cardiovascular: S1S2  Gastrointestinal:soft, (+) BS, no tenderness  Extremities:no e/e/c  IABP right femoral  No Lymphadenopathy  IV sites not inflammed.    LABS:                        11.7   11.06 )-----------( 294      ( 22 Nov 2023 04:16 )             34.5     11-22    129<L>  |  97  |  33<H>  ----------------------------<  165<H>  5.0   |  19<L>  |  1.52<H>    Ca    9.8      22 Nov 2023 04:16  Phos  3.3     11-22  Mg     1.9     11-22    TPro  7.6  /  Alb  3.7  /  TBili  0.5  /  DBili  x   /  AST  20  /  ALT  42  /  AlkPhos  73  11-22    PT/INR - ( 22 Nov 2023 04:17 )   PT: 12.8 sec;   INR: 1.17 ratio         PTT - ( 22 Nov 2023 04:17 )  PTT:75.5 sec  Urinalysis Basic - ( 22 Nov 2023 04:16 )    Color: x / Appearance: x / SG: x / pH: x  Gluc: 165 mg/dL / Ketone: x  / Bili: x / Urobili: x   Blood: x / Protein: x / Nitrite: x   Leuk Esterase: x / RBC: x / WBC x   Sq Epi: x / Non Sq Epi: x / Bacteria: x        MICROBIOLOGY:            RECENT CULTURES:  11-18 @ 13:40  .Blood Blood-Peripheral  --  --  --    No growth at 72 Hours  --  11-18 @ 13:30  .Blood Blood-Peripheral  --  --  --    No growth at 72 Hours  --  11-18 @ 12:13  Clean Catch Clean Catch (Midstream)  Enterococcus faecalis  Enterococcus faecalis  SUNSHINE    10,000 - 49,000 CFU/mL Enterococcus faecalis  <10,000 CFU/ml Normal Urogenital kaitlynn present  --  11-17 @ 18:45  .Blood Blood  Staphylococcus epidermidis  Staphylococcus epidermidis  SUNSHINE    Growth in aerobic and anaerobic bottles: Enterococcus faecalis  See previous culture [id number]  Growth in aerobic bottle: Staphylococcus epidermidis  --  11-17 @ 18:37  .Blood Blood  Blood Culture PCR  Blood Culture PCR  Enterococcus faecalis  Blood Culture PCR  PCR    Growth in aerobic bottle: Enterococcus faecalis  Growth in anaerobic bottle: Staphylococcus epidermidis "Susceptibilities  not performed"  Direct identification is available within approximately 3-5  hours either by Blood Panel Multiplexed PCR or Direct  MALDI-TOF. Details: [email or web address]  --      RADIOLOGY & ADDITIONAL STUDIES:    < from: Xray Chest 1 View- PORTABLE-Routine (Xray Chest 1 View- PORTABLE-Routine in AM.) (11.22.23 @ 07:36) >  Frontal expiratory view of the chest shows the heart to be similar in   size. IABP marker projects 8 mm below the aortic knob.    The lungs are clear and there is no evidence of pneumothorax nor pleural   effusion.    IMPRESSION:  Marker as noted.    < end of copied text >  < from: US Abdomen Doppler (11.21.23 @ 07:44) >  Impression: Patent portal venous system with hepatopedal blood flow. No   portal venous thrombosis.    Patent hepatic veins.    Patent hepatic artery and branches.    < end of copied text >

## 2023-11-22 NOTE — PROGRESS NOTE ADULT - SUBJECTIVE AND OBJECTIVE BOX
Subjective:  - feeling better  - secretions improved  - occasionally using exercise bands while in bed with IABP in place  - denies SOB at rest, orthopnea, lightheadedness, CP    Medications:  aMIOdarone    Tablet 200 milliGRAM(s) Oral daily  aMIOdarone    Tablet   Oral   aspirin  chewable 81 milliGRAM(s) Oral daily  atorvastatin 80 milliGRAM(s) Oral at bedtime  budesonide  80 MICROgram(s)/formoterol 4.5 MICROgram(s) Inhaler 2 Puff(s) Inhalation two times a day  carvedilol 25 milliGRAM(s) Oral every 12 hours  chlorhexidine 2% Cloths 1 Application(s) Topical daily  dextrose 50% Injectable 25 Gram(s) IV Push once  dextrose 50% Injectable 12.5 Gram(s) IV Push once  heparin  Infusion 1600 Unit(s)/Hr IV Continuous <Continuous>  hydrALAZINE 100 milliGRAM(s) Oral three times a day  hydrOXYzine hydrochloride 25 milliGRAM(s) Oral two times a day PRN  insulin glargine Injectable (LANTUS) 12 Unit(s) SubCutaneous at bedtime  insulin lispro (ADMELOG) corrective regimen sliding scale   SubCutaneous three times a day before meals  insulin lispro (ADMELOG) corrective regimen sliding scale   SubCutaneous at bedtime  insulin lispro Injectable (ADMELOG) 8 Unit(s) SubCutaneous three times a day before meals  isosorbide   dinitrate Tablet (ISORDIL) 40 milliGRAM(s) Oral three times a day  polyethylene glycol 3350 17 Gram(s) Oral two times a day  senna 2 Tablet(s) Oral at bedtime  spironolactone 25 milliGRAM(s) Oral daily  vancomycin  IVPB 1500 milliGRAM(s) IV Intermittent every 24 hours      Physical Exam:    Vitals:  Vital Signs Last 24 Hours  T(C): 36.9 (23 @ 12:00), Max: 36.9 (23 @ 12:00)  HR: 78 (23 @ 13:00) (70 - 82)  BP: IABP 1:1 assisted means highs 70s-low 80s  RR: 20 (23 @ 13:00) (17 - 36)  SpO2: 96% (23 @ 13:00) (96% - 100%)    Weight in k.4 (11-22 @ 06:00)    I&O's Summary    2023 07:01  -  2023 07:00  --------------------------------------------------------  IN: 2044.5 mL / OUT: 2900 mL / NET: -855.5 mL    2023 07:01  -  2023 13:49  --------------------------------------------------------  IN: 87 mL / OUT: 975 mL / NET: -888 mL    Tele: NSR    General: No distress. Comfortable.  HEENT: EOM intact.  Neck: Neck supple. JVP ~8-10cm  H2O. No masses  Chest: Clear to auscultation bilaterally  CV: Normal S1 and S2. No murmurs, rub, or gallops. Radial pulses normal. Palpable DP pulses, no LE edema.   Abdomen: Soft, non-distended, non-tender  Skin: No rashes or skin breakdown. warm peripherally  Neurology: Alert and oriented times three. Sensation intact  Psych: Affect normal    Labs:                        11.7   11.06 )-----------( 294      ( 2023 04:16 )             34.5         129<L>  |  97  |  33<H>  ----------------------------<  165<H>  5.0   |  19<L>  |  1.52<H>    Ca    9.8      2023 04:16  Phos  3.3       Mg     1.9         TPro  7.6  /  Alb  3.7  /  TBili  0.5  /  DBili  x   /  AST  20  /  ALT  42  /  AlkPhos  73      PT/INR - ( 2023 04:17 )   PT: 12.8 sec;   INR: 1.17 ratio      PTT - ( 2023 04:17 )  PTT:75.5 sec    Lactate, Blood: 1.0 mmol/L (11-21 @ 03:50)

## 2023-11-23 LAB
ALBUMIN SERPL ELPH-MCNC: 3.9 G/DL — SIGNIFICANT CHANGE UP (ref 3.3–5)
ALBUMIN SERPL ELPH-MCNC: 3.9 G/DL — SIGNIFICANT CHANGE UP (ref 3.3–5)
ALP SERPL-CCNC: 84 U/L — SIGNIFICANT CHANGE UP (ref 40–120)
ALP SERPL-CCNC: 84 U/L — SIGNIFICANT CHANGE UP (ref 40–120)
ALT FLD-CCNC: 40 U/L — SIGNIFICANT CHANGE UP (ref 10–45)
ALT FLD-CCNC: 40 U/L — SIGNIFICANT CHANGE UP (ref 10–45)
ANION GAP SERPL CALC-SCNC: 14 MMOL/L — SIGNIFICANT CHANGE UP (ref 5–17)
ANION GAP SERPL CALC-SCNC: 14 MMOL/L — SIGNIFICANT CHANGE UP (ref 5–17)
APTT BLD: 70.3 SEC — HIGH (ref 24.5–35.6)
APTT BLD: 70.3 SEC — HIGH (ref 24.5–35.6)
APTT BLD: 75.2 SEC — HIGH (ref 24.5–35.6)
APTT BLD: 75.2 SEC — HIGH (ref 24.5–35.6)
APTT BLD: 82.2 SEC — HIGH (ref 24.5–35.6)
APTT BLD: 82.2 SEC — HIGH (ref 24.5–35.6)
AST SERPL-CCNC: 23 U/L — SIGNIFICANT CHANGE UP (ref 10–40)
AST SERPL-CCNC: 23 U/L — SIGNIFICANT CHANGE UP (ref 10–40)
BASOPHILS # BLD AUTO: 0.07 K/UL — SIGNIFICANT CHANGE UP (ref 0–0.2)
BASOPHILS # BLD AUTO: 0.07 K/UL — SIGNIFICANT CHANGE UP (ref 0–0.2)
BASOPHILS NFR BLD AUTO: 0.6 % — SIGNIFICANT CHANGE UP (ref 0–2)
BASOPHILS NFR BLD AUTO: 0.6 % — SIGNIFICANT CHANGE UP (ref 0–2)
BILIRUB SERPL-MCNC: 0.5 MG/DL — SIGNIFICANT CHANGE UP (ref 0.2–1.2)
BILIRUB SERPL-MCNC: 0.5 MG/DL — SIGNIFICANT CHANGE UP (ref 0.2–1.2)
BUN SERPL-MCNC: 39 MG/DL — HIGH (ref 7–23)
BUN SERPL-MCNC: 39 MG/DL — HIGH (ref 7–23)
CALCIUM SERPL-MCNC: 10.2 MG/DL — SIGNIFICANT CHANGE UP (ref 8.4–10.5)
CALCIUM SERPL-MCNC: 10.2 MG/DL — SIGNIFICANT CHANGE UP (ref 8.4–10.5)
CHLORIDE SERPL-SCNC: 95 MMOL/L — LOW (ref 96–108)
CHLORIDE SERPL-SCNC: 95 MMOL/L — LOW (ref 96–108)
CO2 SERPL-SCNC: 19 MMOL/L — LOW (ref 22–31)
CO2 SERPL-SCNC: 19 MMOL/L — LOW (ref 22–31)
CREAT SERPL-MCNC: 1.68 MG/DL — HIGH (ref 0.5–1.3)
CREAT SERPL-MCNC: 1.68 MG/DL — HIGH (ref 0.5–1.3)
CULTURE RESULTS: SIGNIFICANT CHANGE UP
EGFR: 47 ML/MIN/1.73M2 — LOW
EGFR: 47 ML/MIN/1.73M2 — LOW
EOSINOPHIL # BLD AUTO: 0.42 K/UL — SIGNIFICANT CHANGE UP (ref 0–0.5)
EOSINOPHIL # BLD AUTO: 0.42 K/UL — SIGNIFICANT CHANGE UP (ref 0–0.5)
EOSINOPHIL NFR BLD AUTO: 3.7 % — SIGNIFICANT CHANGE UP (ref 0–6)
EOSINOPHIL NFR BLD AUTO: 3.7 % — SIGNIFICANT CHANGE UP (ref 0–6)
GLUCOSE BLDC GLUCOMTR-MCNC: 143 MG/DL — HIGH (ref 70–99)
GLUCOSE BLDC GLUCOMTR-MCNC: 143 MG/DL — HIGH (ref 70–99)
GLUCOSE BLDC GLUCOMTR-MCNC: 144 MG/DL — HIGH (ref 70–99)
GLUCOSE BLDC GLUCOMTR-MCNC: 144 MG/DL — HIGH (ref 70–99)
GLUCOSE BLDC GLUCOMTR-MCNC: 156 MG/DL — HIGH (ref 70–99)
GLUCOSE BLDC GLUCOMTR-MCNC: 156 MG/DL — HIGH (ref 70–99)
GLUCOSE BLDC GLUCOMTR-MCNC: 224 MG/DL — HIGH (ref 70–99)
GLUCOSE BLDC GLUCOMTR-MCNC: 224 MG/DL — HIGH (ref 70–99)
GLUCOSE BLDC GLUCOMTR-MCNC: 229 MG/DL — HIGH (ref 70–99)
GLUCOSE BLDC GLUCOMTR-MCNC: 229 MG/DL — HIGH (ref 70–99)
GLUCOSE SERPL-MCNC: 162 MG/DL — HIGH (ref 70–99)
GLUCOSE SERPL-MCNC: 162 MG/DL — HIGH (ref 70–99)
HCT VFR BLD CALC: 37.6 % — LOW (ref 39–50)
HCT VFR BLD CALC: 37.6 % — LOW (ref 39–50)
HGB BLD-MCNC: 12.4 G/DL — LOW (ref 13–17)
HGB BLD-MCNC: 12.4 G/DL — LOW (ref 13–17)
IMM GRANULOCYTES NFR BLD AUTO: 0.4 % — SIGNIFICANT CHANGE UP (ref 0–0.9)
IMM GRANULOCYTES NFR BLD AUTO: 0.4 % — SIGNIFICANT CHANGE UP (ref 0–0.9)
INR BLD: 1.25 RATIO — HIGH (ref 0.85–1.18)
INR BLD: 1.25 RATIO — HIGH (ref 0.85–1.18)
LYMPHOCYTES # BLD AUTO: 18.1 % — SIGNIFICANT CHANGE UP (ref 13–44)
LYMPHOCYTES # BLD AUTO: 18.1 % — SIGNIFICANT CHANGE UP (ref 13–44)
LYMPHOCYTES # BLD AUTO: 2.05 K/UL — SIGNIFICANT CHANGE UP (ref 1–3.3)
LYMPHOCYTES # BLD AUTO: 2.05 K/UL — SIGNIFICANT CHANGE UP (ref 1–3.3)
MAGNESIUM SERPL-MCNC: 2.2 MG/DL — SIGNIFICANT CHANGE UP (ref 1.6–2.6)
MAGNESIUM SERPL-MCNC: 2.2 MG/DL — SIGNIFICANT CHANGE UP (ref 1.6–2.6)
MCHC RBC-ENTMCNC: 29.2 PG — SIGNIFICANT CHANGE UP (ref 27–34)
MCHC RBC-ENTMCNC: 29.2 PG — SIGNIFICANT CHANGE UP (ref 27–34)
MCHC RBC-ENTMCNC: 33 GM/DL — SIGNIFICANT CHANGE UP (ref 32–36)
MCHC RBC-ENTMCNC: 33 GM/DL — SIGNIFICANT CHANGE UP (ref 32–36)
MCV RBC AUTO: 88.7 FL — SIGNIFICANT CHANGE UP (ref 80–100)
MCV RBC AUTO: 88.7 FL — SIGNIFICANT CHANGE UP (ref 80–100)
MONOCYTES # BLD AUTO: 0.66 K/UL — SIGNIFICANT CHANGE UP (ref 0–0.9)
MONOCYTES # BLD AUTO: 0.66 K/UL — SIGNIFICANT CHANGE UP (ref 0–0.9)
MONOCYTES NFR BLD AUTO: 5.8 % — SIGNIFICANT CHANGE UP (ref 2–14)
MONOCYTES NFR BLD AUTO: 5.8 % — SIGNIFICANT CHANGE UP (ref 2–14)
NEUTROPHILS # BLD AUTO: 8.09 K/UL — HIGH (ref 1.8–7.4)
NEUTROPHILS # BLD AUTO: 8.09 K/UL — HIGH (ref 1.8–7.4)
NEUTROPHILS NFR BLD AUTO: 71.4 % — SIGNIFICANT CHANGE UP (ref 43–77)
NEUTROPHILS NFR BLD AUTO: 71.4 % — SIGNIFICANT CHANGE UP (ref 43–77)
NRBC # BLD: 0 /100 WBCS — SIGNIFICANT CHANGE UP (ref 0–0)
NRBC # BLD: 0 /100 WBCS — SIGNIFICANT CHANGE UP (ref 0–0)
PHOSPHATE SERPL-MCNC: 4 MG/DL — SIGNIFICANT CHANGE UP (ref 2.5–4.5)
PHOSPHATE SERPL-MCNC: 4 MG/DL — SIGNIFICANT CHANGE UP (ref 2.5–4.5)
PLATELET # BLD AUTO: 289 K/UL — SIGNIFICANT CHANGE UP (ref 150–400)
PLATELET # BLD AUTO: 289 K/UL — SIGNIFICANT CHANGE UP (ref 150–400)
POTASSIUM SERPL-MCNC: 5.4 MMOL/L — HIGH (ref 3.5–5.3)
POTASSIUM SERPL-MCNC: 5.4 MMOL/L — HIGH (ref 3.5–5.3)
POTASSIUM SERPL-SCNC: 5.4 MMOL/L — HIGH (ref 3.5–5.3)
POTASSIUM SERPL-SCNC: 5.4 MMOL/L — HIGH (ref 3.5–5.3)
PROCALCITONIN SERPL-MCNC: 0.07 NG/ML — SIGNIFICANT CHANGE UP (ref 0.02–0.1)
PROCALCITONIN SERPL-MCNC: 0.07 NG/ML — SIGNIFICANT CHANGE UP (ref 0.02–0.1)
PROT SERPL-MCNC: 8.3 G/DL — SIGNIFICANT CHANGE UP (ref 6–8.3)
PROT SERPL-MCNC: 8.3 G/DL — SIGNIFICANT CHANGE UP (ref 6–8.3)
PROTHROM AB SERPL-ACNC: 13 SEC — SIGNIFICANT CHANGE UP (ref 9.5–13)
PROTHROM AB SERPL-ACNC: 13 SEC — SIGNIFICANT CHANGE UP (ref 9.5–13)
RBC # BLD: 4.24 M/UL — SIGNIFICANT CHANGE UP (ref 4.2–5.8)
RBC # BLD: 4.24 M/UL — SIGNIFICANT CHANGE UP (ref 4.2–5.8)
RBC # FLD: 14.6 % — HIGH (ref 10.3–14.5)
RBC # FLD: 14.6 % — HIGH (ref 10.3–14.5)
SODIUM SERPL-SCNC: 128 MMOL/L — LOW (ref 135–145)
SODIUM SERPL-SCNC: 128 MMOL/L — LOW (ref 135–145)
SPECIMEN SOURCE: SIGNIFICANT CHANGE UP
UFH PPP CHRO-ACNC: 0.75 IU/ML — HIGH (ref 0.3–0.7)
UFH PPP CHRO-ACNC: 0.75 IU/ML — HIGH (ref 0.3–0.7)
VANCOMYCIN TROUGH SERPL-MCNC: 13.8 UG/ML — SIGNIFICANT CHANGE UP (ref 10–20)
VANCOMYCIN TROUGH SERPL-MCNC: 13.8 UG/ML — SIGNIFICANT CHANGE UP (ref 10–20)
WBC # BLD: 11.34 K/UL — HIGH (ref 3.8–10.5)
WBC # BLD: 11.34 K/UL — HIGH (ref 3.8–10.5)
WBC # FLD AUTO: 11.34 K/UL — HIGH (ref 3.8–10.5)
WBC # FLD AUTO: 11.34 K/UL — HIGH (ref 3.8–10.5)

## 2023-11-23 PROCEDURE — 93010 ELECTROCARDIOGRAM REPORT: CPT

## 2023-11-23 PROCEDURE — 99291 CRITICAL CARE FIRST HOUR: CPT | Mod: 25

## 2023-11-23 PROCEDURE — 99232 SBSQ HOSP IP/OBS MODERATE 35: CPT | Mod: GC

## 2023-11-23 PROCEDURE — 71045 X-RAY EXAM CHEST 1 VIEW: CPT | Mod: 26

## 2023-11-23 PROCEDURE — 99292 CRITICAL CARE ADDL 30 MIN: CPT | Mod: 25

## 2023-11-23 RX ORDER — HEPATITIS A VIRUS VACCINE 50 UNIT/ML
1 SYRINGE (ML) INTRAMUSCULAR ONCE
Refills: 0 | Status: COMPLETED | OUTPATIENT
Start: 2023-11-23 | End: 2023-11-24

## 2023-11-23 RX ORDER — SODIUM ZIRCONIUM CYCLOSILICATE 10 G/10G
10 POWDER, FOR SUSPENSION ORAL EVERY 8 HOURS
Refills: 0 | Status: COMPLETED | OUTPATIENT
Start: 2023-11-23 | End: 2023-11-24

## 2023-11-23 RX ORDER — INSULIN HUMAN 100 [IU]/ML
10 INJECTION, SOLUTION SUBCUTANEOUS ONCE
Refills: 0 | Status: COMPLETED | OUTPATIENT
Start: 2023-11-23 | End: 2023-11-23

## 2023-11-23 RX ORDER — DEXTROSE 50 % IN WATER 50 %
50 SYRINGE (ML) INTRAVENOUS ONCE
Refills: 0 | Status: COMPLETED | OUTPATIENT
Start: 2023-11-23 | End: 2023-11-23

## 2023-11-23 RX ADMIN — SPIRONOLACTONE 25 MILLIGRAM(S): 25 TABLET, FILM COATED ORAL at 05:10

## 2023-11-23 RX ADMIN — ATORVASTATIN CALCIUM 80 MILLIGRAM(S): 80 TABLET, FILM COATED ORAL at 23:03

## 2023-11-23 RX ADMIN — INSULIN GLARGINE 12 UNIT(S): 100 INJECTION, SOLUTION SUBCUTANEOUS at 23:00

## 2023-11-23 RX ADMIN — Medication 100 MILLIGRAM(S): at 22:59

## 2023-11-23 RX ADMIN — AMIODARONE HYDROCHLORIDE 200 MILLIGRAM(S): 400 TABLET ORAL at 05:10

## 2023-11-23 RX ADMIN — CHLORHEXIDINE GLUCONATE 1 APPLICATION(S): 213 SOLUTION TOPICAL at 23:03

## 2023-11-23 RX ADMIN — ISOSORBIDE DINITRATE 40 MILLIGRAM(S): 5 TABLET ORAL at 12:14

## 2023-11-23 RX ADMIN — SODIUM ZIRCONIUM CYCLOSILICATE 10 GRAM(S): 10 POWDER, FOR SUSPENSION ORAL at 13:22

## 2023-11-23 RX ADMIN — CARVEDILOL PHOSPHATE 25 MILLIGRAM(S): 80 CAPSULE, EXTENDED RELEASE ORAL at 18:06

## 2023-11-23 RX ADMIN — ISOSORBIDE DINITRATE 40 MILLIGRAM(S): 5 TABLET ORAL at 05:10

## 2023-11-23 RX ADMIN — HEPARIN SODIUM 14 UNIT(S)/HR: 5000 INJECTION INTRAVENOUS; SUBCUTANEOUS at 13:18

## 2023-11-23 RX ADMIN — INSULIN HUMAN 10 UNIT(S): 100 INJECTION, SOLUTION SUBCUTANEOUS at 05:50

## 2023-11-23 RX ADMIN — SENNA PLUS 2 TABLET(S): 8.6 TABLET ORAL at 22:59

## 2023-11-23 RX ADMIN — ISOSORBIDE DINITRATE 40 MILLIGRAM(S): 5 TABLET ORAL at 18:06

## 2023-11-23 RX ADMIN — Medication 100 MILLIGRAM(S): at 05:10

## 2023-11-23 RX ADMIN — Medication 81 MILLIGRAM(S): at 12:15

## 2023-11-23 RX ADMIN — Medication 1: at 12:16

## 2023-11-23 RX ADMIN — Medication 250 MILLIGRAM(S): at 18:07

## 2023-11-23 RX ADMIN — Medication 100 MILLIGRAM(S): at 13:17

## 2023-11-23 RX ADMIN — HEPARIN SODIUM 14 UNIT(S)/HR: 5000 INJECTION INTRAVENOUS; SUBCUTANEOUS at 22:59

## 2023-11-23 RX ADMIN — SODIUM ZIRCONIUM CYCLOSILICATE 10 GRAM(S): 10 POWDER, FOR SUSPENSION ORAL at 22:58

## 2023-11-23 RX ADMIN — Medication 250 MILLIGRAM(S): at 05:35

## 2023-11-23 RX ADMIN — Medication 50 MILLILITER(S): at 05:49

## 2023-11-23 RX ADMIN — CARVEDILOL PHOSPHATE 25 MILLIGRAM(S): 80 CAPSULE, EXTENDED RELEASE ORAL at 05:10

## 2023-11-23 RX ADMIN — Medication 2: at 18:09

## 2023-11-23 RX ADMIN — Medication 8 UNIT(S): at 08:54

## 2023-11-23 RX ADMIN — BUDESONIDE AND FORMOTEROL FUMARATE DIHYDRATE 2 PUFF(S): 160; 4.5 AEROSOL RESPIRATORY (INHALATION) at 09:24

## 2023-11-23 RX ADMIN — Medication 8 UNIT(S): at 12:15

## 2023-11-23 RX ADMIN — Medication 8 UNIT(S): at 18:08

## 2023-11-23 NOTE — PROGRESS NOTE ADULT - ASSESSMENT
58 yrs old male w/ hx of HFrEF (LVIDd 6.4 cm, LVEF 32%), CAD s/p PCI (2008), HTN, DMT2 (A1c 8.3%) and CVA s/p TPA (2018), recently treated for PNA who initially presented to [facility] via EMS after syncope reportedly requiring defibrilation. Treated for ACS but left AMA to come [facility]. Pt has covid and was placed on IABP for hypotension. Now being evaluated for Heart transplant Vs LVAD placement. Nephrology consulted for ARIC

## 2023-11-23 NOTE — PROGRESS NOTE ADULT - SUBJECTIVE AND OBJECTIVE BOX
[facility] Division of Kidney Diseases & Hypertension  FOLLOW UP NOTE  [phone]--------------------------------------------------------------------------------    Chief Complaint: ARIC    24 hour events/subjective:  Seen in CCU at the bedside.   No acute overnight events. On IABP.   Denies any headaches, fevers/chills, chest pain, palpitations, SOB, and leg swelling.    PAST HISTORY  --------------------------------------------------------------------------------  No significant changes to PMH, PSH, FHx, SHx, unless otherwise noted    ALLERGIES & MEDICATIONS  --------------------------------------------------------------------------------  Allergies    penicillins (Unknown)    Intolerances      Standing Inpatient Medications  aMIOdarone    Tablet   Oral   aMIOdarone    Tablet 200 milliGRAM(s) Oral daily  aspirin  chewable 81 milliGRAM(s) Oral daily  atorvastatin 80 milliGRAM(s) Oral at bedtime  budesonide  80 MICROgram(s)/formoterol 4.5 MICROgram(s) Inhaler 2 Puff(s) Inhalation two times a day  carvedilol 25 milliGRAM(s) Oral every 12 hours  chlorhexidine 2% Cloths 1 Application(s) Topical daily  dextrose 50% Injectable 25 Gram(s) IV Push once  dextrose 50% Injectable 12.5 Gram(s) IV Push once  heparin  Infusion 1600 Unit(s)/Hr IV Continuous <Continuous>  hydrALAZINE 100 milliGRAM(s) Oral three times a day  insulin glargine Injectable (LANTUS) 12 Unit(s) SubCutaneous at bedtime  insulin lispro (ADMELOG) corrective regimen sliding scale   SubCutaneous three times a day before meals  insulin lispro (ADMELOG) corrective regimen sliding scale   SubCutaneous at bedtime  insulin lispro Injectable (ADMELOG) 8 Unit(s) SubCutaneous three times a day before meals  isosorbide   dinitrate Tablet (ISORDIL) 40 milliGRAM(s) Oral three times a day  polyethylene glycol 3350 17 Gram(s) Oral two times a day  senna 2 Tablet(s) Oral at bedtime  sodium zirconium cyclosilicate 10 Gram(s) Oral every 8 hours  vancomycin  IVPB 1000 milliGRAM(s) IV Intermittent every 12 hours    PRN Inpatient Medications  hydrOXYzine hydrochloride 25 milliGRAM(s) Oral two times a day PRN      REVIEW OF SYSTEMS  --------------------------------------------------------------------------------  per above    VITALS/PHYSICAL EXAM  --------------------------------------------------------------------------------  T(C): 36.9 (11-23-23 @ 08:00), Max: 36.9 (11-23-23 @ 00:00)  HR: 79 (11-23-23 @ 12:00) (69 - 81)  BP: 102/60 (11-23-23 @ 08:00) (102/60 - 104/60)  RR: 20 (11-23-23 @ 12:00) (13 - 26)  SpO2: 96% (11-23-23 @ 12:00) (96% - 99%)  Wt(kg): --        11-22-23 @ 07:01  -  11-23-23 @ 07:00  --------------------------------------------------------  IN: 1356.5 mL / OUT: 2225 mL / NET: -868.5 mL    11-23-23 @ 07:01  -  11-23-23 @ 12:32  --------------------------------------------------------  IN: 382 mL / OUT: 0 mL / NET: 382 mL      Physical Exam:  Gen: NAD  HEENT: ADAM  Pulm: CTA B/L  CV: S1S2 +IABP  Abd: Soft, +BS   Ext: No LE edema B/L  Neuro: Awake  Skin: Warm and dry  Vascular access: Rt RAMYA cisse    LABS/STUDIES  --------------------------------------------------------------------------------              12.4   11.34 >-----------<  289      [11-23-23 @ 04:45]              37.6     128  |  95  |  39  ----------------------------<  162      [11-23-23 @ 04:44]  5.4   |  19  |  1.68        Ca     10.2     [11-23-23 @ 04:44]      Mg     2.2     [11-23-23 @ 04:44]      Phos  4.0     [11-23-23 @ 04:44]    TPro  8.3  /  Alb  3.9  /  TBili  0.5  /  DBili  x   /  AST  23  /  ALT  40  /  AlkPhos  84  [11-23-23 @ 04:44]    PT/INR: PT 13.0 , INR 1.25       [11-23-23 @ 04:45]  PTT: 75.2       [11-23-23 @ 11:10]      Creatinine Trend:  SCr 1.68 [11-23 @ 04:44]  SCr 1.52 [11-22 @ 04:16]  SCr 1.61 [11-21 @ 03:50]  SCr 1.79 [11-20 @ 00:09]  SCr 1.65 [11-19 @ 02:54]    Urinalysis - [11-23-23 @ 04:44]      Color  / Appearance  / SG  / pH       Gluc 162 / Ketone   / Bili  / Urobili        Blood  / Protein  / Leuk Est  / Nitrite       RBC  / WBC  / Hyaline  / Gran  / Sq Epi  / Non Sq Epi  / Bacteria     Urine Creatinine 62      [11-21-23 @ 03:50]  Urine Protein <7      [11-21-23 @ 03:50]  Urine Sodium 88      [11-19-23 @ 04:24]  Urine Potassium 22      [11-19-23 @ 04:24]  Urine Phosphorus 39.3      [11-19-23 @ 04:24]  Urine Osmolality 431      [11-19-23 @ 04:24]        C3 Complement 171      [11-19-23 @ 02:54]  C4 Complement 45      [11-19-23 @ 02:54]  ANCA: cANCA Negative, pANCA Negative, atypical ANCA Negative      [11-21-23 @ 03:50]  anti-GBM <0.2      [11-19-23 @ 02:54]  PLA2R: JACEK <1.8, IFA --      [11-19-23 @ 02:54]  Free Light Chains: kappa 3.76, lambda 3.25, ratio = 1.16      [11-19 @ 02:54]  Immunofixation Serum:   No Monoclonal Band Identified      Reference Range: None Detected      [11-19-23 @ 02:54]

## 2023-11-23 NOTE — PROGRESS NOTE ADULT - CRITICAL CARE ATTENDING COMMENT
History of anxiety disorder and CAD s/p PCI  Admitted with cardiogenic shock and respiratory failure in the setting of stent restenosis  Transferred out and had VT/VF cardiac arrest  Readmitted to CICU  A+Ox3, anxious - continue Atarax prn  Cardiogenic shock requiring IABP, also on Bidil for afterload reduction - will maintain  VT/VF arrest on Amiodarone PO   SpO2 mid 90s on room air  Soft diet  Likely CKD post arrest, compensated on exam - continue to hold diuretics  H/H low but acceptable on Heparin drip for LV thrombus  Afebrile, on Vancomycin for enterococcus bacteremia, now cleared and IABP replaced  Sugars controlled on Lantus  IABP 11/20

## 2023-11-23 NOTE — PROGRESS NOTE ADULT - PROBLEM SELECTOR PLAN 2
Pt has hyponatremia that was developed during the hospital course.  He is not symptomatic. Urine Osm 471 and Urine Na 88.     PLAN:  Encouraged patient to reduce his water intake  Fluid restriction to less than 1L a day.  Trend and monitor.    Hyperkalemia - Encouraged patient to limit K rich foods (banana, orange, etc). please restrict diet to 2K. Can give lokelma 5gm today for K of 5.4. Monitor potassium.     If you have any questions, please feel free to contact me  Robert Garcia  Nephrology Fellow  [phone]; Prefer [employer] TEAMS  (After 5pm or on weekends please page the on-call fellow).

## 2023-11-23 NOTE — PROGRESS NOTE ADULT - PROBLEM SELECTOR PLAN 1
ARIC in the setting of heart failure, COVID infection. At the time of presentation Scr is 1.25. Started to worsen on 11/4 and peaked at 4.07 11/10 and gradually improved to 1.8 on 11/18. Pt had LHC on 11/1. He has hx of DM with proteinuria of 684 but most recent UA negative. Primary team is planning to get cardiac transplant eval. Currently nonoliguric, UOP of 1.2L. BETZAIDA, HIV, Hep B, Hep C - negative. A1c - 8.3. Complements are not low. BETZAIDA and ANCA negative. Light chains - not significant. UPCR <0.1, Phos, Anti GBM abs - negative. UPEP negative.   Duplex - normal, symmetric blood flow throughout both kidneys. Velocities and RIs are limited by IABP.       PLAN:  No acute intervention from nephrology point of view is indicated. Scr stable at 1.68 today.   Pending serological workup - PLA2R Ab, Anti Ds DNA, immunofixation,    Avoid nephrotoxic agents. Dose meds per eGFR.

## 2023-11-23 NOTE — PROGRESS NOTE ADULT - SUBJECTIVE AND OBJECTIVE BOX
LD VALENCIA  58y Male  MRN:[id number]    Patient is a 58y old  Male who presents with a chief complaint of NSTEMI (01 Nov 2023 20:29)    HPI:  57yo M w/ hx HTN, CAD w/ 1 stent in 2009, ICH (2008) presenting with abn ekg. Patient presented to [facility] where he was found to have STEMI, recommended to get cath however patient did not want to get it there so it left and came here.  Patient initially had cough, congestion, fever, was placed on antibiotics on Sunday.  Started feeling nauseous and had a presyncopal event after which he presented to ED last night.  Had chest pain as well.  Chest pain is midsternal.  Not currently having chest pain.  Received 4 aspirin 30 min pta. (01 Nov 2023 15:11)      Patient seen and evaluated at bedside in CCU. interval events noted    Interval HPI: remain in ccu. IABP in place   c/o sneezing        PAST MEDICAL & SURGICAL HISTORY:  HTN (hypertension)      CAD (coronary artery disease)  2009; stent      Intracranial hemorrhage  2008      Respiratory arrest  december 1st      Myocardial infarction, unspecified MI type, unspecified artery      History of coronary artery stent placement          REVIEW OF SYSTEMS:  as per hpi     VITALS:   ICU Vital Signs Last 24 Hrs  T(C): 36.9 (23 Nov 2023 08:00), Max: 36.9 (23 Nov 2023 00:00)  T(F): 98.4 (23 Nov 2023 08:00), Max: 98.5 (23 Nov 2023 04:00)  HR: 79 (23 Nov 2023 12:00) (69 - 81)  BP: 102/60 (23 Nov 2023 08:00) (102/60 - 104/60)  BP(mean): 73 (23 Nov 2023 08:00) (73 - 76)  ABP: --  ABP(mean): --  RR: 20 (23 Nov 2023 12:00) (13 - 26)  SpO2: 96% (23 Nov 2023 12:00) (96% - 99%)    O2 Parameters below as of 23 Nov 2023 12:00  Patient On (Oxygen Delivery Method): room air                PHYSICAL EXAM:  GENERAL: NAD, comfortable   HEAD:  Atraumatic, Normocephalic  EYES: EOMI, PERRLA, conjunctiva and sclera clear  NECK: Supple, No JVD   CHEST/LUNG: Clear to auscultation bilaterally; No wheeze  HEART: Regular rate and rhythm; No murmurs, rubs, or gallops  ABDOMEN: Soft, Nontender, Nondistended; Bowel sounds present  EXTREMITIES:  2+ Peripheral Pulses, No clubbing, cyanosis, or edema  NEUROLOGY: nonfocal  SKIN: No rashes or lesions  +iabp    Consultant(s) Notes Reviewed:  [x ] YES  [ ] NO  Care Discussed with Consultants/Other Providers [ x] YES  [ ] NO    MEDS:   MEDICATIONS  (STANDING):  aMIOdarone    Tablet 200 milliGRAM(s) Oral daily  aMIOdarone    Tablet   Oral   aspirin  chewable 81 milliGRAM(s) Oral daily  atorvastatin 80 milliGRAM(s) Oral at bedtime  budesonide  80 MICROgram(s)/formoterol 4.5 MICROgram(s) Inhaler 2 Puff(s) Inhalation two times a day  carvedilol 25 milliGRAM(s) Oral every 12 hours  chlorhexidine 2% Cloths 1 Application(s) Topical daily  dextrose 50% Injectable 25 Gram(s) IV Push once  dextrose 50% Injectable 12.5 Gram(s) IV Push once  heparin  Infusion 1600 Unit(s)/Hr (14 mL/Hr) IV Continuous <Continuous>  hydrALAZINE 100 milliGRAM(s) Oral three times a day  insulin glargine Injectable (LANTUS) 12 Unit(s) SubCutaneous at bedtime  insulin lispro (ADMELOG) corrective regimen sliding scale   SubCutaneous at bedtime  insulin lispro (ADMELOG) corrective regimen sliding scale   SubCutaneous three times a day before meals  insulin lispro Injectable (ADMELOG) 8 Unit(s) SubCutaneous three times a day before meals  isosorbide   dinitrate Tablet (ISORDIL) 40 milliGRAM(s) Oral three times a day  polyethylene glycol 3350 17 Gram(s) Oral two times a day  senna 2 Tablet(s) Oral at bedtime  sodium zirconium cyclosilicate 10 Gram(s) Oral every 8 hours  vancomycin  IVPB 1000 milliGRAM(s) IV Intermittent every 12 hours    MEDICATIONS  (PRN):  hydrOXYzine hydrochloride 25 milliGRAM(s) Oral two times a day PRN Anxiety      ALLERGIES:  penicillins (Unknown)      LABS:                                                                  12.4   11.34 )-----------( 289      ( 23 Nov 2023 04:45 )             37.6   11-23    128<L>  |  95<L>  |  39<H>  ----------------------------<  162<H>  5.4<H>   |  19<L>  |  1.68<H>    Ca    10.2      23 Nov 2023 04:44  Phos  4.0     11-23  Mg     2.2     11-23    TPro  8.3  /  Alb  3.9  /  TBili  0.5  /  DBili  x   /  AST  23  /  ALT  40  /  AlkPhos  84  11-23        < from: Colonoscopy (11.17.23 @ 10:35) >                                                                                                        Impression:          - The entire examined colon is normal on direct and retroflexion views.                       - No specimens collected.  Recommendation:      - Resume previous diet today.                       - No large polyps or masses detected, No objection from GI standpoint to                 proceed with heart transplantation/advanced heart therapies.                       - Please call back should any further questions or concerns arise.    < end of copied text >     < from: Xray Chest 1 View- PORTABLE-Urgent (11.01.23 @ 07:42) >    IMPRESSION:  Clear lungs.    ---End of Report ---        < end of copied text >  < from: TTE W or WO Ultrasound Enhancing Agent (11.01.23 @ 10:23) >  _____________________________     CONCLUSIONS:      1. Left ventricular cavity is moderately dilated. Left ventricular wall thickness is normal. Left ventricular systolic function is severely decreased with an ejection fraction of 32 % by Chinchilla's method of disks. Regional wall motion abnormalities present.   2. Multiple segmental abnormalities exist. See findings.   3. There is moderate (grade 2) left ventricular diastolic dysfunction, with indeterminant filling pressure.   4. Normal right ventricular cavity size, wall thickness, and systolic function.   5. No significant valvular disease.   6. No pericardial effusion seen.   7. Compared to the transthoracic echocardiogram performed on 1/25/2017 the areas of akinesis are unchanged but there has been a decline in LV systolic function with new areas of hypokinesis.    __________________________________________________________________    < end of copied text >  < from: TTE Limited W or WO Ultrasound Enhancing Agent (11.02.23 @ 07:41) >  __________________________     CONCLUSIONS:      1. After obtaining consent, Definity ultrasound enhancing agent was given for enhanced left ventricular opacification and improved delineation of the left ventricular endocardial borders. Left ventricular systolic function is severely decreased with a calculated ejection fraction of 22 % by the Chinchilla's biplane method of disks. There is a left ventricular thrombus.   2. Findings were discussed with Litzy BOSS on 11/2/2023 at 8.49am.   3. There is a left ventricular thrombus.    _________________________________________________________________    < end of copied text >

## 2023-11-23 NOTE — PROGRESS NOTE ADULT - SUBJECTIVE AND OBJECTIVE BOX
PATIENT:  DEVORAH VALENCIA  [id number]    CHIEF COMPLAINT:  Patient is a 58y old  Male who presents with a chief complaint of advanced cardiac support (2023 18:56)      INTERVAL HISTORYOVERNIGHT EVENTS:      REVIEW OF SYSTEMS:    Constitutional:     [ ] negative [ ] fevers [ ] chills [ ] weight loss [ ] weight gain  HEENT:                  [ ] negative [ ] dry eyes [ ] eye irritation [ ] postnasal drip [ ] nasal congestion  CV:                         [ ] negative  [ ] chest pain [ ] orthopnea [ ] palpitations [ ] murmur  Resp:                     [ ] negative [ ] cough [ ] shortness of breath [ ] dyspnea [ ] wheezing [ ] sputum [ ] hemoptysis  GI:                          [ ] negative [ ] nausea [ ] vomiting [ ] diarrhea [ ] constipation [ ] abd pain [ ] dysphagia   :                        [ ] negative [ ] dysuria [ ] nocturia [ ] hematuria [ ] increased urinary frequency  Musculoskeletal: [ ] negative [ ] back pain [ ] myalgias [ ] arthralgias [ ] fracture  Skin:                       [ ] negative [ ] rash [ ] itch  Neurological:        [ ] negative [ ] headache [ ] dizziness [ ] syncope [ ] weakness [ ] numbness  Psychiatric:           [ ] negative [ ] anxiety [ ] depression  Endocrine:            [ ] negative [ ] diabetes [ ] thyroid problem  Heme/Lymph:      [ ] negative [ ] anemia [ ] bleeding problem  Allergic/Immune: [ ] negative [ ] itchy eyes [ ] nasal discharge [ ] hives [ ] angioedema    [ ] All other systems negative  [ ] Unable to assess ROS because ________.    MEDICATIONS:  MEDICATIONS  (STANDING):  aMIOdarone    Tablet   Oral   aMIOdarone    Tablet 200 milliGRAM(s) Oral daily  aspirin  chewable 81 milliGRAM(s) Oral daily  atorvastatin 80 milliGRAM(s) Oral at bedtime  budesonide  80 MICROgram(s)/formoterol 4.5 MICROgram(s) Inhaler 2 Puff(s) Inhalation two times a day  carvedilol 25 milliGRAM(s) Oral every 12 hours  chlorhexidine 2% Cloths 1 Application(s) Topical daily  dextrose 50% Injectable 25 Gram(s) IV Push once  dextrose 50% Injectable 12.5 Gram(s) IV Push once  heparin  Infusion 1600 Unit(s)/Hr (14 mL/Hr) IV Continuous <Continuous>  hydrALAZINE 100 milliGRAM(s) Oral three times a day  insulin glargine Injectable (LANTUS) 12 Unit(s) SubCutaneous at bedtime  insulin lispro (ADMELOG) corrective regimen sliding scale   SubCutaneous at bedtime  insulin lispro (ADMELOG) corrective regimen sliding scale   SubCutaneous three times a day before meals  insulin lispro Injectable (ADMELOG) 8 Unit(s) SubCutaneous three times a day before meals  isosorbide   dinitrate Tablet (ISORDIL) 40 milliGRAM(s) Oral three times a day  polyethylene glycol 3350 17 Gram(s) Oral two times a day  senna 2 Tablet(s) Oral at bedtime  sodium zirconium cyclosilicate 10 Gram(s) Oral every 8 hours  spironolactone 25 milliGRAM(s) Oral daily  vancomycin  IVPB 1000 milliGRAM(s) IV Intermittent every 12 hours    MEDICATIONS  (PRN):  hydrOXYzine hydrochloride 25 milliGRAM(s) Oral two times a day PRN Anxiety      ALLERGIES:  Allergies    penicillins (Unknown)    Intolerances        OBJECTIVE:  ICU Vital Signs Last 24 Hrs  T(C): 36.9 (2023 04:00), Max: 36.9 (2023 12:00)  T(F): 98.5 (2023 04:00), Max: 98.5 (2023 12:00)  HR: 75 (2023 07:00) (69 - 78)  BP: 104/60 (2023 20:00) (104/60 - 104/60)  BP(mean): 76 (2023 20:00) (76 - 76)  ABP: --  ABP(mean): --  RR: 20 (2023 07:00) (13 - 24)  SpO2: 98% (2023 07:00) (96% - 99%)    O2 Parameters below as of 2023 07:00  Patient On (Oxygen Delivery Method): room air            Adult Advanced Hemodynamics Last 24 Hrs  CVP(mm Hg): --  CVP(cm H2O): --  CO: --  CI: --  PA: --  PA(mean): --  PCWP: --  SVR: --  SVRI: --  PVR: --  PVRI: --  CAPILLARY BLOOD GLUCOSE      POCT Blood Glucose.: 143 mg/dL (2023 05:47)  POCT Blood Glucose.: 147 mg/dL (2023 22:09)  POCT Blood Glucose.: 132 mg/dL (2023 17:11)  POCT Blood Glucose.: 155 mg/dL (2023 12:10)  POCT Blood Glucose.: 168 mg/dL (2023 08:11)    CAPILLARY BLOOD GLUCOSE      POCT Blood Glucose.: 143 mg/dL (2023 05:47)    I&O's Summary    2023 07:01  -  2023 07:00  --------------------------------------------------------  IN: 1356.5 mL / OUT: 2225 mL / NET: -868.5 mL      Daily     Daily Weight in k.1 (2023 05:00)    PHYSICAL EXAMINATION:  General: WN/WD NAD  HEENT: PERRLA, EOMI, moist mucous membranes  Neurology: A&Ox3, nonfocal, MOISE x 4  Respiratory: CTA B/L, normal respiratory effort, no wheezes, crackles, rales  CV: RRR, S1S2, no murmurs, rubs or gallops  Abdominal: Soft, NT, ND +BS, Last BM  Extremities: No edema, + peripheral pulses  Incisions:   Tubes:    LABS:                          12.4   11.34 )-----------( 289      ( 2023 04:45 )             37.6     11-    128<L>  |  95<L>  |  39<H>  ----------------------------<  162<H>  5.4<H>   |  19<L>  |  1.68<H>    Ca    10.2      2023 04:44  Phos  4.0     11  Mg     2.2         TPro  8.3  /  Alb  3.9  /  TBili  0.5  /  DBili  x   /  AST  23  /  ALT  40  /  AlkPhos  84  11-23    LIVER FUNCTIONS - ( 2023 04:44 )  Alb: 3.9 g/dL / Pro: 8.3 g/dL / ALK PHOS: 84 U/L / ALT: 40 U/L / AST: 23 U/L / GGT: x           PT/INR - ( 2023 04:45 )   PT: 13.0 sec;   INR: 1.25 ratio         PTT - ( 2023 04:45 )  PTT:82.2 sec        Urinalysis Basic - ( 2023 04:44 )    Color: x / Appearance: x / SG: x / pH: x  Gluc: 162 mg/dL / Ketone: x  / Bili: x / Urobili: x   Blood: x / Protein: x / Nitrite: x   Leuk Esterase: x / RBC: x / WBC x   Sq Epi: x / Non Sq Epi: x / Bacteria: x      ====================ASSESSMENT ==============  58 male with HTN, CAD (s/p PCI ), HFrEF, CVA , and T2DM presenting with chest pressure and unknown tachycardia that was shocked x1, [facility]  found to have in-stent restenosis of pLAD and  of RCA with elevated RA and PA pressures and severely decreased EF 32%, admitted to CICU for management of cardiogenic shock requiring IABP  -, with hospital course c/b vfib arrest requiring reinsertion of IABP. Currently undergoing workup for AT evaluation with HF.    Plan:  ====================== NEUROLOGY=====================  # Anxiety  - No Seroquel/antipsychotics since vfib arrest and prolonged QTC  - Psych Eval, recommended SSRI, but pt. refused   - psych recommending atarax PRN  - continue to monitor mental status    PT/Conditioning  - continue band exercised while on bedrest s/t IABP, IS    ==================== RESPIRATORY======================  # Acute Hypoxemic Respiratory Failure  - s/p x2 intubations for cardiogenic pulm edema and the in setting of cardiac arrest, resolved - extubated 11/10  - Currently on room air with spO2 mid 90s  - Continue incentive spirometry and monitoring of sp02    # Asthma  - c/w albuterol, symbicort and spiriva  - on trelegy at home    ====================CARDIOVASCULAR===================  # Vfib arrest i/s/o ischemia  - Lido gtt off   - PO Amio load - total of 5g per EP complete , continue PO amio  - Keep K > 4, Mag > 2.2     # Cardiogenic shock requiring IABP (- , -)  - Likely 2/2 NSTEMI and ADHF  -  LHC: pLAD 100 % in-stent restenosis & mRCA, 100 %. PCWP 30. IABP placed.  -  TTE: LV dilated. EF 32 %. Regional WMAs present, mod (grade 2) LV diastolic dysfunction  -  TTE: EF 22% and + LV thrombus  - PRN Bumex IVP as needed  - IABP swapped  to RFA, continue 1:1 support  - Off Milrinone gtt @ 7:30 am   - Currently on hydralazine 100 TID and ISD 40 TID for AL reduction  - Continue romel 25 daily and coreg 25 BID for GDMT  - Listed OHT status 2 , F/u HF recs    # NSTEMI iso stent re-occlusion of pLAD and 100%  of RCA  - EKG on admission w/LBBB  - DAPT: c/w ASA, Brilinta d/c'd per transplant w/u  - c/w lipitor 80  - cMR deferred given necessity of IABP  - CT sx not recommending CABG, undergoing AT eval    # LV thrombus  - c/w heparin gtt    ===================== RENAL =========================  # Non-oliguric ARIC   - sCr stable, Baseline Cr: 1-.  - renal US - no evidence of renal artery stenosis  - Trend BMP, lytes daily, replace as needed  - Continue Strict I/Os, avoid nephrotoxins    Mild Hyponatremia/ Hyperkalemia  - in setting of ARIC and or new CKD baseline  - assess volume status and consider diuresis if hypervolemic   - 1.2L Fluid restriction, gtts in NS where applicable   - low K diet     ==================== GASTROINTESTINAL===================  - DASH diet  - Continue to monitor for BM    ===================HEMATOLOGIC/ONC ===================  # VTE prophylaxis   - c/w heparin gtt given LV Thrombus and IABP     - H/H and platelets stable    =======================    ENDOCRINE  =====================  # Type 2 DM  - A1c 8.3, glucose   - Continue lantus 12, premeal 8, low ISS    ========================INFECTIOUS DISEASE================  # Enterococcus faecalis bacteremia  - BCx + for enterococcus faecalis x2, Staph epi x1 (likely contaminant)- pan sensitive   - Urine cx  + enterococcus faecalis  - BCx  NGTD  - IABP site swapped to LRFA   - continue vancomycin 1g q12h (-  - CT A/P negative for infectious pathology    Transplant ID  - pt requiring vaccines prior to transplant, would initiate series accordingly    # COVID, resolved  - Off airborne precautions   # Pre-transplant ID w/u   - Trend ID recs for serologies   - Colonoscopy  - normal   - Chest CT  - improved LLL aeration      LINES:   RIJ Forney -, RAroberto Villasenor -, LFA IABP  -, RFA IABP -

## 2023-11-23 NOTE — PROGRESS NOTE ADULT - ATTENDING COMMENTS
# ARIC -  Serum creatinine is stable at 1.6-1.7mg/dL. No indication for dialysis.    # Hyponatremia - limit water intake to < 1 Liter.     # Hyperkalemia - for lokelma. Low K diet.       The rest of the recommendations as per fellow's note.    Suzanne Jackson MD  Attending Nephrologist  [phone] or via [phone]

## 2023-11-23 NOTE — PROGRESS NOTE ADULT - SUBJECTIVE AND OBJECTIVE BOX
DEVORAH VALENCIA  MRN-[id number]  Patient is a 58y old  Male who presents with a chief complaint of advanced cardiac support (2023 18:56)    HPI:  pt seen and approx 1:30 pm  in [facility]    59yo M w/ hx HTN, CAD w/ 1 stent in , ICH () presenting with abn ekg. Patient presented to [facility] where he was found to have STEMI, recommended to get cath however patient did not want to get it there so it left and came here.  Patient initially had cough, congestion, fever, was placed on antibiotics on .  Started feeling nauseous and had a presyncopal event after which he presented to ED last night.  Had chest pain as well.  Chest pain is midsternal.  Not currently having chest pain.  Received 4 aspirin 30 min pta. (2023 15:11)      Hospital Course:    24 HOUR EVENTS:    REVIEW OF SYSTEMS:    CONSTITUTIONAL: No weakness, fevers or chills  EYES/ENT: No visual changes;  No vertigo or throat pain   NECK: No pain or stiffness  RESPIRATORY: No cough, wheezing, hemoptysis; No shortness of breath  CARDIOVASCULAR: No chest pain or palpitations  GASTROINTESTINAL: No abdominal or epigastric pain. No nausea, vomiting, or hematemesis; No diarrhea or constipation. No melena or hematochezia.  GENITOURINARY: No dysuria, frequency or hematuria  NEUROLOGICAL: No numbness or weakness  SKIN: No itching, rashes      ICU Vital Signs Last 24 Hrs  T(C): 37.1 (2023 20:00), Max: 37.1 (2023 20:00)  T(F): 98.8 (2023 20:00), Max: 98.8 (2023 20:00)  HR: 79 (2023 20:00) (69 - 81)  BP: 102/60 (2023 08:00) (102/60 - 102/60)  BP(mean): 73 (2023 08:00) (73 - 73)  ABP: --  ABP(mean): --  RR: 19 (2023 20:00) (13 - 28)  SpO2: 98% (2023 20:00) (96% - 99%)    O2 Parameters below as of 2023 20:00  Patient On (Oxygen Delivery Method): room air            CVP(mm Hg): --  CO: --  CI: --  PA: --  PA(mean): --  PA(direct): --  PCWP: --  LA: --  RA: --  SVR: --  SVRI: --  PVR: --  PVRI: --  I&O's Summary    2023 07:  -  2023 07:00  --------------------------------------------------------  IN: 1356.5 mL / OUT: 2225 mL / NET: -868.5 mL    2023 07:01  -  2023 20:09  --------------------------------------------------------  IN: 1122 mL / OUT: 650 mL / NET: 472 mL        CAPILLARY BLOOD GLUCOSE    CAPILLARY BLOOD GLUCOSE      POCT Blood Glucose.: 224 mg/dL (2023 18:00)      PHYSICAL EXAM:  GENERAL: No acute distress, well-developed  HEAD:  Atraumatic, Normocephalic  EYES: EOMI, PERRLA, conjunctiva and sclera clear  NECK: Supple, no lymphadenopathy, no JVD  CHEST/LUNG: CTAB; No wheezes, rales, or rhonchi  HEART: Regular rate and rhythm. Normal S1/S2. No murmurs, rubs, or gallops  ABDOMEN: Soft, non-tender, non-distended; normal bowel sounds, no organomegaly  EXTREMITIES:  2+ peripheral pulses b/l, No clubbing, cyanosis, or edema  NEUROLOGY: A&O x 3, no focal deficits  SKIN: No rashes or lesions    ============================I/O===========================   I&O's Detail    2023 07:01  -  2023 07:00  --------------------------------------------------------  IN:    Heparin: 346.5 mL    IV PiggyBack: 250 mL    Oral Fluid: 760 mL  Total IN: 1356.5 mL    OUT:    Voided (mL): 2225 mL  Total OUT: 2225 mL    Total NET: -868.5 mL      2023 07:01  -  2023 20:09  --------------------------------------------------------  IN:    Heparin: 182 mL    IV PiggyBack: 250 mL    Oral Fluid: 690 mL  Total IN: 1122 mL    OUT:    Voided (mL): 650 mL  Total OUT: 650 mL    Total NET: 472 mL        ============================ LABS =========================                        12.4   11.34 )-----------( 289      ( 2023 04:45 )             37.6     -    128<L>  |  95<L>  |  39<H>  ----------------------------<  162<H>  5.4<H>   |  19<L>  |  1.68<H>    Ca    10.2      2023 04:44  Phos  4.0       Mg     2.2         TPro  8.3  /  Alb  3.9  /  TBili  0.5  /  DBili  x   /  AST  23  /  ALT  40  /  AlkPhos  84  11                LIVER FUNCTIONS - ( 2023 04:44 )  Alb: 3.9 g/dL / Pro: 8.3 g/dL / ALK PHOS: 84 U/L / ALT: 40 U/L / AST: 23 U/L / GGT: x           PT/INR - ( 2023 04:45 )   PT: 13.0 sec;   INR: 1.25 ratio         PTT - ( 2023 18:25 )  PTT:70.3 sec    Lactate, Blood: 1.0 mmol/L (23 @ 03:50)    Urinalysis Basic - ( 2023 04:44 )    Color: x / Appearance: x / SG: x / pH: x  Gluc: 162 mg/dL / Ketone: x  / Bili: x / Urobili: x   Blood: x / Protein: x / Nitrite: x   Leuk Esterase: x / RBC: x / WBC x   Sq Epi: x / Non Sq Epi: x / Bacteria: x      ======================Micro/Rad/Cardio=================  Telemtry: Reviewed   EKG: Reviewed  CXR: Reviewed  Culture: Reviewed   Echo:   Cath: Cardiac Cath Lab - Adult:   [facility]  Department of Cardiology  [address] [place] [address]  [phone]  Cath Lab Report -- Comprehensive Report  Patient: LD VALENCIA  Study date: 2017  Account number: [id number]  MR number: [id number]  : 1965  Gender: Male  Race: W  Case Physician(s):  Jairon Holguin M.D.  Referring Physician:  Luc Lynn M.D.  INDICATIONS: Unstable angina - CCS4.  HISTORY: The patient has a history of coronary artery disease. The patient  hashypertension and medication-treated dyslipidemia.  PROCEDURE:  --  Left heart catheterization with ventriculography.  --  Left coronary angiography.  --  Right coronary angiography.  TECHNIQUE: The risks and alternatives of the procedures and conscious  sedation were explained to the patient and informed consent was obtained.  Cardiac catheterization performed electively.  Local anesthetic given. Right radial artery access. A 6FR PRELUDE KIT was  inserted in the vessel. Left heart catheterization. Ventriculography was  performed. A 5FR FR4.0 EXPO catheter was utilized. Left coronary artery  angiography. The vessel was injected utilizing a 5FR FL3.5 EXPO catheter.  Right coronary artery angiography. The vessel was injected utilizing a 5FR  FR4.0 EXPO catheter. RADIATION EXPOSURE: 1.1 min.  CONTRAST GIVEN: Omnipaque 55 ml.  MEDICATIONS GIVEN: Midazolam, 1 mg, IV. Fentanyl, 25 mcg, IV. Verapamil  (Isoptin, Calan, Covera), 2.5 mg, IA. Heparin, 3000 units, IA.  VENTRICLES: Global left ventricular function was moderately depressed. EF  estimated was 40 %.  CORONARY VESSELS: The coronary circulation is right dominant.  LM:   --  LM: Normal.  LAD:   --  Proximal LAD: There was a 50 % stenosis.  CX:   --  Circumflex: Normal.  RCA:   --  Mid RCA: There was a 40 % stenosis.  --  Distal RCA: There was a 50 % stenosis.  COMPLICATIONS: There were no complications.  DIAGNOSTIC RECOMMENDATIONS: The patient should continue with the present  medications.  Prepared and signed by  Jairon Holguin M.D.  Signed 2017 12:20:13  HEMODYNAMIC TABLES  Pressures:  Baseline/ Room Air  Pressures:  - HR: 78  Pressures:  - Rhythm:  Pressures:  -- Aortic Pressure (S/D/M): --/--/99  Pressures:  -- Left Ventricle (s/edp): 157/39/--  Outputs:  Baseline/ Room Air  Outputs:  -- CALCULATIONS[age]  Outputs:  -- CALCULATIONS: Body Surface Area: 2.05  Outputs:  -- CALCULATIONS: Height in cm: 175.00  Outputs:  -- CALCULATIONS: Sex: Male  Outputs:  -- CALCULATIONS: Weight in k.40 (17 @ 21:55)    ======================================================  PAST MEDICAL & SURGICAL HISTORY:  HTN (hypertension)      CAD (coronary artery disease)  ; stent      Intracranial hemorrhage        Respiratory arrest        Myocardial infarction, unspecified MI type, unspecified artery      History of coronary artery stent placement  ====================ASSESSMENT ==============  58 male with HTN, CAD (s/p PCI ), HFrEF, CVA , and T2DM presenting with chest pressure and unknown tachycardia that was shocked x1, [facility]  found to have in-stent restenosis of pLAD and  of RCA with elevated RA and PA pressures and severely decreased EF 32%, admitted to CICU for management of cardiogenic shock requiring IABP  -, with hospital course c/b vfib arrest requiring reinsertion of IABP. Currently undergoing workup for AT evaluation with HF.    Plan:  ====================== NEUROLOGY=====================  # Anxiety  - No Seroquel/antipsychotics since vfib arrest and prolonged QTC  - Psych Eval, recommended SSRI, but pt. refused   - psych recommending atarax PRN  - continue to monitor mental status    PT/Conditioning  - continue band exercised while on bedrest s/t IABP, IS    ==================== RESPIRATORY======================  # Acute Hypoxemic Respiratory Failure  - s/p x2 intubations for cardiogenic pulm edema and the in setting of cardiac arrest, resolved - extubated 11/10  - Currently on room air with spO2 mid 90s  - Continue incentive spirometry and monitoring of sp02    # Asthma  - c/w albuterol, symbicort and spiriva  - on trelegy at home    ====================CARDIOVASCULAR===================  # Vfib arrest i/s/o ischemia  - Lido gtt off   - PO Amio load - total of 5g per EP complete , continue PO amio  - Keep K > 4, Mag > 2.2     # Cardiogenic shock requiring IABP (- , -)  - Likely 2/2 NSTEMI and ADHF  -  LHC: pLAD 100 % in-stent restenosis & mRCA, 100 %. PCWP 30. IABP placed.  -  TTE: LV dilated. EF 32 %. Regional WMAs present, mod (grade 2) LV diastolic dysfunction  -  TTE: EF 22% and + LV thrombus  - PRN Bumex IVP as needed  - IABP swapped  to RFA, continue 1:1 support  - Off Milrinone gtt @ 7:30 am   - Currently on hydralazine 100 TID and ISD 40 TID for AL reduction  - Continue romel 25 daily and coreg 25 BID for GDMT  - Listed OHT status 2 , F/u HF recs    # NSTEMI iso stent re-occlusion of pLAD and 100%  of RCA  - EKG on admission w/LBBB  - DAPT: c/w ASA, Brilinta d/c'd per transplant w/u  - c/w lipitor 80  - cMR deferred given necessity of IABP  - CT sx not recommending CABG, undergoing AT eval    # LV thrombus  - c/w heparin gtt    ===================== RENAL =========================  # Non-oliguric ARIC   - sCr stable, Baseline Cr: 1-  - renal US - no evidence of renal artery stenosis  - Trend BMP, lytes daily, replace as needed  - Continue Strict I/Os, avoid nephrotoxins    Mild Hyponatremia/ Hyperkalemia  - in setting of ARIC and or new CKD baseline  - assess volume status and consider diuresis if hypervolemic   - 1.2L Fluid restriction, gtts in NS where applicable   - low K diet     ==================== GASTROINTESTINAL===================  - DASH diet  - Continue to monitor for BM    ===================HEMATOLOGIC/ONC ===================  # VTE prophylaxis   - c/w heparin gtt given LV Thrombus and IABP     - H/H and platelets stable    =======================    ENDOCRINE  =====================  # Type 2 DM  - A1c 8.3, glucose   - Continue lantus 12, premeal 8, low ISS    ========================INFECTIOUS DISEASE================  # Enterococcus faecalis bacteremia  - BCx + for enterococcus faecalis x2, Staph epi x1 (likely contaminant)- pan sensitive   - Urine cx  + enterococcus faecalis  - BCx  NGTD  - IABP site swapped to LRFA   - continue vancomycin 1g q12h (-  - CT A/P negative for infectious pathology    Transplant ID  - pt requiring vaccines prior to transplant, would initiate series accordingly    # COVID, resolved  - Off airborne precautions   # Pre-transplant ID w/u   - Trend ID recs for serologies   - Colonoscopy  - normal   - Chest CT  - improved LLL aeration    Patient requires continuous monitoring with bedside rhythm monitoring, pulse ox monitoring, and intermittent blood gas analysis. Care plan discussed with ICU care team. Patient remained critical and at risk for life threatening decompensation.  Patient seen, examined and plan discussed with CCU team during rounds.     I have personally provided ____ minutes of critical care time excluding time spent on separate procedures, in addition to initial critical care time provided by the CICU Attending, Dr. Lees.    By signing my name below, I, Kalyn Sousa, attest that this documentation has been prepared under the direction and in the presence of Kirsty Davis NP.   Electronically signed: Rosalba Booth, 23 @ 20:09    I, Kirsty Davsi , personally performed the services described in this documentation. all medical record entries made by the eileenibe were at my direction and in my presence. I have reviewed the chart and agree that the record reflects my personal performance and is accurate and complete  Electronically signed: Kirsty Davis NP.       DEVORAH VALENCIA  MRN-[id number]  Patient is a 58y old  Male who presents with a chief complaint of advanced cardiac support (2023 18:56)    HPI:  pt seen and approx 1:30 pm  in [facility]    57yo M w/ hx HTN, CAD w/ 1 stent in , ICH () presenting with abn ekg. Patient presented to [facility] where he was found to have STEMI, recommended to get cath however patient did not want to get it there so it left and came here.  Patient initially had cough, congestion, fever, was placed on antibiotics on .  Started feeling nauseous and had a presyncopal event after which he presented to ED last night.  Had chest pain as well.  Chest pain is midsternal.  Not currently having chest pain.  Received 4 aspirin 30 min pta. (2023 15:11)      Hospital Course:    24 HOUR EVENTS:    REVIEW OF SYSTEMS:    CONSTITUTIONAL: No weakness, fevers or chills  EYES/ENT: No visual changes;  No vertigo or throat pain   NECK: No pain or stiffness  RESPIRATORY: No cough, wheezing, hemoptysis; No shortness of breath  CARDIOVASCULAR: No chest pain or palpitations  GASTROINTESTINAL: No abdominal or epigastric pain. No nausea, vomiting, or hematemesis; No diarrhea or constipation. No melena or hematochezia.  GENITOURINARY: No dysuria, frequency or hematuria  NEUROLOGICAL: No numbness or weakness  SKIN: No itching, rashes      ICU Vital Signs Last 24 Hrs  T(C): 37.1 (2023 20:00), Max: 37.1 (2023 20:00)  T(F): 98.8 (2023 20:00), Max: 98.8 (2023 20:00)  HR: 79 (2023 20:00) (69 - 81)  BP: 102/60 (2023 08:00) (102/60 - 102/60)  BP(mean): 73 (2023 08:00) (73 - 73)  ABP: --  ABP(mean): --  RR: 19 (2023 20:00) (13 - 28)  SpO2: 98% (2023 20:00) (96% - 99%)    O2 Parameters below as of 2023 20:00  Patient On (Oxygen Delivery Method): room air            I&O's Summary    :  -  2023 07:00  --------------------------------------------------------  IN: 1356.5 mL / OUT: 2225 mL / NET: -868.5 mL    2023 07:  -  2023 20:09  --------------------------------------------------------  IN: 1122 mL / OUT: 650 mL / NET: 472 mL        CAPILLARY BLOOD GLUCOSE    CAPILLARY BLOOD GLUCOSE      POCT Blood Glucose.: 224 mg/dL (2023 18:00)      PHYSICAL EXAM:  GENERAL: No acute distress, well-developed  HEAD:  Atraumatic, Normocephalic  EYES: EOMI, PERRLA, conjunctiva and sclera clear  NECK: Supple, no lymphadenopathy, no JVD  CHEST/LUNG: CTAB; No wheezes, rales, or rhonchi  HEART: Regular rate and rhythm. Normal S1/S2. No murmurs, rubs, or gallops  ABDOMEN: Soft, non-tender, non-distended; normal bowel sounds, no organomegaly  EXTREMITIES:  2+ peripheral pulses b/l, No clubbing, cyanosis, or edema  NEUROLOGY: A&O x 3, no focal deficits  SKIN: No rashes or lesions    ============================I/O===========================   I&O's Detail    :  -  2023 07:00  --------------------------------------------------------  IN:    Heparin: 346.5 mL    IV PiggyBack: 250 mL    Oral Fluid: 760 mL  Total IN: 1356.5 mL    OUT:    Voided (mL): 2225 mL  Total OUT: 2225 mL    Total NET: -868.5 mL      2023 07:01  -  2023 20:09  --------------------------------------------------------  IN:    Heparin: 182 mL    IV PiggyBack: 250 mL    Oral Fluid: 690 mL  Total IN: 1122 mL    OUT:    Voided (mL): 650 mL  Total OUT: 650 mL    Total NET: 472 mL        ============================ LABS =========================                        12.4   11.34 )-----------( 289      ( 2023 04:45 )             37.6         128<L>  |  95<L>  |  39<H>  ----------------------------<  162<H>  5.4<H>   |  19<L>  |  1.68<H>    Ca    10.2      2023 04:44  Phos  4.0       Mg     2.2         TPro  8.3  /  Alb  3.9  /  TBili  0.5  /  DBili  x   /  AST  23  /  ALT  40  /  AlkPhos  84                  LIVER FUNCTIONS - ( 2023 04:44 )  Alb: 3.9 g/dL / Pro: 8.3 g/dL / ALK PHOS: 84 U/L / ALT: 40 U/L / AST: 23 U/L / GGT: x           PT/INR - ( 2023 04:45 )   PT: 13.0 sec;   INR: 1.25 ratio         PTT - ( 2023 18:25 )  PTT:70.3 sec    Lactate, Blood: 1.0 mmol/L (23 @ 03:50)    Urinalysis Basic - ( 2023 04:44 )    Color: x / Appearance: x / SG: x / pH: x  Gluc: 162 mg/dL / Ketone: x  / Bili: x / Urobili: x   Blood: x / Protein: x / Nitrite: x   Leuk Esterase: x / RBC: x / WBC x   Sq Epi: x / Non Sq Epi: x / Bacteria: x      ======================Micro/Rad/Cardio=================  Telemtry: Reviewed   EKG: Reviewed  CXR: Reviewed  Culture: Reviewed   Echo:   Cath: Cardiac Cath Lab - Adult:   [facility] [place]  Department of Cardiology  [address] [place] [address]  [phone]  Cath Lab Report -- Comprehensive Report  Patient: LD VALENCIA  Study date: 2017  Account number: [id number]  MR number: [id number]  : 1965  Gender: Male  Race: W  Case Physician(s):  Jairon Holguin M.D.  Referring Physician:  Luc Lynn M.D.  INDICATIONS: Unstable angina - CCS4.  HISTORY: The patient has a history of coronary artery disease. The patient  hashypertension and medication-treated dyslipidemia.  PROCEDURE:  --  Left heart catheterization with ventriculography.  --  Left coronary angiography.  --  Right coronary angiography.  TECHNIQUE: The risks and alternatives of the procedures and conscious  sedation were explained to the patient and informed consent was obtained.  Cardiac catheterization performed electively.  Local anesthetic given. Right radial artery access. A 6FR PRELUDE KIT was  inserted in the vessel. Left heart catheterization. Ventriculography was  performed. A 5FR FR4.0 EXPO catheter was utilized. Left coronary artery  angiography. The vessel was injected utilizing a 5FR FL3.5 EXPO catheter.  Right coronary artery angiography. The vessel was injected utilizing a 5FR  FR4.0 EXPO catheter. RADIATION EXPOSURE: 1.1 min.  CONTRAST GIVEN: Omnipaque 55 ml.  MEDICATIONS GIVEN: Midazolam, 1 mg, IV. Fentanyl, 25 mcg, IV. Verapamil  (Isoptin, Calan, Covera), 2.5 mg, IA. Heparin, 3000 units, IA.  VENTRICLES: Global left ventricular function was moderately depressed. EF  estimated was 40 %.  CORONARY VESSELS: The coronary circulation is right dominant.  LM:   --  LM: Normal.  LAD:   --  Proximal LAD: There was a 50 % stenosis.  CX:   --  Circumflex: Normal.  RCA:   --  Mid RCA: There was a 40 % stenosis.  --  Distal RCA: There was a 50 % stenosis.  COMPLICATIONS: There were no complications.  DIAGNOSTIC RECOMMENDATIONS: The patient should continue with the present  medications.  Prepared and signed by  Jairon Holguin M.D.  Signed 2017 12:20:13  HEMODYNAMIC TABLES  Pressures:  Baseline/ Room Air  Pressures:  - HR: 78  Pressures:  - Rhythm:  Pressures:  -- Aortic Pressure (S/D/M): --/--/99  Pressures:  -- Left Ventricle (s/edp): 157/39/--  Outputs:  Baseline/ Room Air  Outputs:  -- CALCULATIONS[age]  Outputs:  -- CALCULATIONS: Body Surface Area: 2.05  Outputs:  -- CALCULATIONS: Height in cm: 175.00  Outputs:  -- CALCULATIONS: Sex: Male  Outputs:  -- CALCULATIONS: Weight in k.40 (17 @ 21:55)    ======================================================  PAST MEDICAL & SURGICAL HISTORY:  HTN (hypertension)      CAD (coronary artery disease)  ; stent      Intracranial hemorrhage        Respiratory arrest        Myocardial infarction, unspecified MI type, unspecified artery      History of coronary artery stent placement  ====================ASSESSMENT ==============  58 male with HTN, CAD (s/p PCI ), HFrEF, CVA , and T2DM presenting with chest pressure and unknown tachycardia that was shocked x1, [facility]  found to have in-stent restenosis of pLAD and  of RCA with elevated RA and PA pressures and severely decreased EF 32%, admitted to CICU for management of cardiogenic shock requiring IABP  -, with hospital course c/b vfib arrest requiring reinsertion of IABP. Currently undergoing workup for AT evaluation with HF.    Plan:  ====================== NEUROLOGY=====================  # Anxiety  - No Seroquel/antipsychotics since vfib arrest and prolonged QTC  - Psych Eval, recommended SSRI, but pt. refused   - psych recommending atarax PRN  - continue to monitor mental status    PT/Conditioning  - continue band exercised while on bedrest s/t IABP, IS    ==================== RESPIRATORY======================  # Acute Hypoxemic Respiratory Failure  - s/p x2 intubations for cardiogenic pulm edema and the in setting of cardiac arrest, resolved - extubated 11/10  - Currently on room air with spO2 mid 90s  - Continue incentive spirometry and monitoring of sp02    # Asthma  - c/w albuterol, symbicort and spiriva  - on trelegy at home    ====================CARDIOVASCULAR===================  # Vfib arrest i/s/o ischemia  - Lido gtt off   - PO Amio load - total of 5g per EP complete , continue PO amio  - Keep K > 4, Mag > 2.2     # Cardiogenic shock requiring IABP (- , -)  - Likely 2/2 NSTEMI and ADHF  -  LHC: pLAD 100 % in-stent restenosis & mRCA, 100 %. PCWP 30. IABP placed.  -  TTE: LV dilated. EF 32 %. Regional WMAs present, mod (grade 2) LV diastolic dysfunction  -  TTE: EF 22% and + LV thrombus  - PRN Bumex IVP as needed  - IABP swapped  to RFA, continue 1:1 support  - Off Milrinone gtt @ 7:30 am   - Currently on hydralazine 100 TID and ISD 40 TID for AL reduction  - Continue romel 25 daily and coreg 25 BID for GDMT  - Listed OHT status 2 , F/u HF recs    # NSTEMI iso stent re-occlusion of pLAD and 100%  of RCA  - EKG on admission w/LBBB  - DAPT: c/w ASA, Brilinta d/c'd per transplant w/u  - c/w lipitor 80  - cMR deferred given necessity of IABP  - CT sx not recommending CABG, undergoing AT eval    # LV thrombus  - c/w heparin gtt    ===================== RENAL =========================  # Non-oliguric ARIC   - sCr stable, Baseline Cr: -  - renal US - no evidence of renal artery stenosis  - Trend BMP, lytes daily, replace as needed  - Continue Strict I/Os, avoid nephrotoxins    Mild Hyponatremia/ Hyperkalemia  - in setting of ARIC and or new CKD baseline  - assess volume status and consider diuresis if hypervolemic   - 1.2L Fluid restriction, gtts in NS where applicable   - low K diet     ==================== GASTROINTESTINAL===================  - DASH diet  - Continue to monitor for BM    ===================HEMATOLOGIC/ONC ===================  # VTE prophylaxis   - c/w heparin gtt given LV Thrombus and IABP     - H/H and platelets stable    =======================    ENDOCRINE  =====================  # Type 2 DM  - A1c 8.3, glucose   - Continue lantus 12, premeal 8, low ISS    ========================INFECTIOUS DISEASE================  # Enterococcus faecalis bacteremia  - BCx + for enterococcus faecalis x2, Staph epi x1 (likely contaminant)- pan sensitive   - Urine cx 11/18 + enterococcus faecalis  - BCx  NGTD  - IABP site swapped to LRFA   - continue vancomycin 1g q12h (-  - CT A/P negative for infectious pathology    Transplant ID  - pt requiring vaccines prior to transplant, would initiate series accordingly    # COVID, resolved  - Off airborne precautions   # Pre-transplant ID w/u   - Trend ID recs for serologies   - Colonoscopy  - normal   - Chest CT  - improved LLL aeration    Patient requires continuous monitoring with bedside rhythm monitoring, pulse ox monitoring, and intermittent blood gas analysis. Care plan discussed with ICU care team. Patient remained critical and at risk for life threatening decompensation.  Patient seen, examined and plan discussed with CCU team during rounds.     I have personally provided __30__ minutes of critical care time excluding time spent on separate procedures, in addition to initial critical care time provided by the CICU Attending, Dr. Lees.    By signing my name below, I, Kalyn Sousa, attest that this documentation has been prepared under the direction and in the presence of Kirsty Davis NP.   Electronically signed: Rosalba Booth, 23 @ 20:09    I, Kirsty Davis , personally performed the services described in this documentation. all medical record entries made by the eileenibmartha were at my direction and in my presence. I have reviewed the chart and agree that the record reflects my personal performance and is accurate and complete  Electronically signed: Kirsty Davis NP.

## 2023-11-23 NOTE — PROGRESS NOTE ADULT - ASSESSMENT
59 yo male h/o htn, cad s/p pci, ICH, here with NSTEMI  s/p intubation and cath. now in CCU    NSTEMI  s/p  cath  cath results noted. multi vessel dz., CTSx f/u.   hep gtt  pt with vtach/fib arrest again on 11/8. s/p ACLS and ROSC.   now extubated  IABP in place  mngt as per CCU  plan for transplant vs LVAD as per HF and transplant team  transplant w/u ongoing.   s/p colonoscopy 11/17. normal  now listed for transplant as of 11/22    LV thrombus   hep gtt    acute on chronic systolic heart failure  heart failure team f/u    pna  recently diagnosed outpt  iv abx now stopped  f/u cult   id following     covid  supportive care    c/o sneezing  consider repeat viral swab     h/o ICH  resolved    agitation  psy consult f/u    bacteremia  id following  iv abx  f/u repeat cult - neg    mngt as per CCU team  d/w CCU attn         Advanced care planning was discussed with patient and family.  Advanced care planning forms were reviewed and discussed as appropriate.  Differential diagnosis and plan of care discussed with patient after the evaluation.   Pain assessed and judicious use of narcotics when appropriate was discussed.  Importance of Fall prevention discussed.  Counseling on Smoking and Alcohol cessation was offered when appropriate.  Counseling on Diet, exercise, and medication compliance was done.       Approx 75 minutes spent.

## 2023-11-24 LAB
ALBUMIN SERPL ELPH-MCNC: 4 G/DL — SIGNIFICANT CHANGE UP (ref 3.3–5)
ALBUMIN SERPL ELPH-MCNC: 4 G/DL — SIGNIFICANT CHANGE UP (ref 3.3–5)
ALP SERPL-CCNC: 82 U/L — SIGNIFICANT CHANGE UP (ref 40–120)
ALP SERPL-CCNC: 82 U/L — SIGNIFICANT CHANGE UP (ref 40–120)
ALT FLD-CCNC: 41 U/L — SIGNIFICANT CHANGE UP (ref 10–45)
ALT FLD-CCNC: 41 U/L — SIGNIFICANT CHANGE UP (ref 10–45)
ANION GAP SERPL CALC-SCNC: 13 MMOL/L — SIGNIFICANT CHANGE UP (ref 5–17)
ANION GAP SERPL CALC-SCNC: 13 MMOL/L — SIGNIFICANT CHANGE UP (ref 5–17)
APTT BLD: 82.4 SEC — HIGH (ref 24.5–35.6)
APTT BLD: 82.4 SEC — HIGH (ref 24.5–35.6)
AST SERPL-CCNC: 21 U/L — SIGNIFICANT CHANGE UP (ref 10–40)
AST SERPL-CCNC: 21 U/L — SIGNIFICANT CHANGE UP (ref 10–40)
BASOPHILS # BLD AUTO: 0.07 K/UL — SIGNIFICANT CHANGE UP (ref 0–0.2)
BASOPHILS # BLD AUTO: 0.07 K/UL — SIGNIFICANT CHANGE UP (ref 0–0.2)
BASOPHILS NFR BLD AUTO: 0.6 % — SIGNIFICANT CHANGE UP (ref 0–2)
BASOPHILS NFR BLD AUTO: 0.6 % — SIGNIFICANT CHANGE UP (ref 0–2)
BILIRUB SERPL-MCNC: 0.4 MG/DL — SIGNIFICANT CHANGE UP (ref 0.2–1.2)
BILIRUB SERPL-MCNC: 0.4 MG/DL — SIGNIFICANT CHANGE UP (ref 0.2–1.2)
BUN SERPL-MCNC: 41 MG/DL — HIGH (ref 7–23)
BUN SERPL-MCNC: 41 MG/DL — HIGH (ref 7–23)
CALCIUM SERPL-MCNC: 10 MG/DL — SIGNIFICANT CHANGE UP (ref 8.4–10.5)
CALCIUM SERPL-MCNC: 10 MG/DL — SIGNIFICANT CHANGE UP (ref 8.4–10.5)
CHLORIDE SERPL-SCNC: 94 MMOL/L — LOW (ref 96–108)
CHLORIDE SERPL-SCNC: 94 MMOL/L — LOW (ref 96–108)
CO2 SERPL-SCNC: 20 MMOL/L — LOW (ref 22–31)
CO2 SERPL-SCNC: 20 MMOL/L — LOW (ref 22–31)
CREAT SERPL-MCNC: 1.52 MG/DL — HIGH (ref 0.5–1.3)
CREAT SERPL-MCNC: 1.52 MG/DL — HIGH (ref 0.5–1.3)
EGFR: 52 ML/MIN/1.73M2 — LOW
EGFR: 52 ML/MIN/1.73M2 — LOW
EOSINOPHIL # BLD AUTO: 0.44 K/UL — SIGNIFICANT CHANGE UP (ref 0–0.5)
EOSINOPHIL # BLD AUTO: 0.44 K/UL — SIGNIFICANT CHANGE UP (ref 0–0.5)
EOSINOPHIL NFR BLD AUTO: 3.9 % — SIGNIFICANT CHANGE UP (ref 0–6)
EOSINOPHIL NFR BLD AUTO: 3.9 % — SIGNIFICANT CHANGE UP (ref 0–6)
GLUCOSE BLDC GLUCOMTR-MCNC: 148 MG/DL — HIGH (ref 70–99)
GLUCOSE BLDC GLUCOMTR-MCNC: 148 MG/DL — HIGH (ref 70–99)
GLUCOSE BLDC GLUCOMTR-MCNC: 154 MG/DL — HIGH (ref 70–99)
GLUCOSE BLDC GLUCOMTR-MCNC: 154 MG/DL — HIGH (ref 70–99)
GLUCOSE BLDC GLUCOMTR-MCNC: 162 MG/DL — HIGH (ref 70–99)
GLUCOSE BLDC GLUCOMTR-MCNC: 162 MG/DL — HIGH (ref 70–99)
GLUCOSE BLDC GLUCOMTR-MCNC: 173 MG/DL — HIGH (ref 70–99)
GLUCOSE BLDC GLUCOMTR-MCNC: 173 MG/DL — HIGH (ref 70–99)
GLUCOSE SERPL-MCNC: 189 MG/DL — HIGH (ref 70–99)
GLUCOSE SERPL-MCNC: 189 MG/DL — HIGH (ref 70–99)
HCT VFR BLD CALC: 35.8 % — LOW (ref 39–50)
HCT VFR BLD CALC: 35.8 % — LOW (ref 39–50)
HGB BLD-MCNC: 12 G/DL — LOW (ref 13–17)
HGB BLD-MCNC: 12 G/DL — LOW (ref 13–17)
IMM GRANULOCYTES NFR BLD AUTO: 0.4 % — SIGNIFICANT CHANGE UP (ref 0–0.9)
IMM GRANULOCYTES NFR BLD AUTO: 0.4 % — SIGNIFICANT CHANGE UP (ref 0–0.9)
INR BLD: 1.18 RATIO — SIGNIFICANT CHANGE UP (ref 0.85–1.18)
INR BLD: 1.18 RATIO — SIGNIFICANT CHANGE UP (ref 0.85–1.18)
LYMPHOCYTES # BLD AUTO: 1.69 K/UL — SIGNIFICANT CHANGE UP (ref 1–3.3)
LYMPHOCYTES # BLD AUTO: 1.69 K/UL — SIGNIFICANT CHANGE UP (ref 1–3.3)
LYMPHOCYTES # BLD AUTO: 15.2 % — SIGNIFICANT CHANGE UP (ref 13–44)
LYMPHOCYTES # BLD AUTO: 15.2 % — SIGNIFICANT CHANGE UP (ref 13–44)
MAGNESIUM SERPL-MCNC: 2 MG/DL — SIGNIFICANT CHANGE UP (ref 1.6–2.6)
MAGNESIUM SERPL-MCNC: 2 MG/DL — SIGNIFICANT CHANGE UP (ref 1.6–2.6)
MCHC RBC-ENTMCNC: 29.5 PG — SIGNIFICANT CHANGE UP (ref 27–34)
MCHC RBC-ENTMCNC: 29.5 PG — SIGNIFICANT CHANGE UP (ref 27–34)
MCHC RBC-ENTMCNC: 33.5 GM/DL — SIGNIFICANT CHANGE UP (ref 32–36)
MCHC RBC-ENTMCNC: 33.5 GM/DL — SIGNIFICANT CHANGE UP (ref 32–36)
MCV RBC AUTO: 88 FL — SIGNIFICANT CHANGE UP (ref 80–100)
MCV RBC AUTO: 88 FL — SIGNIFICANT CHANGE UP (ref 80–100)
MONOCYTES # BLD AUTO: 0.62 K/UL — SIGNIFICANT CHANGE UP (ref 0–0.9)
MONOCYTES # BLD AUTO: 0.62 K/UL — SIGNIFICANT CHANGE UP (ref 0–0.9)
MONOCYTES NFR BLD AUTO: 5.6 % — SIGNIFICANT CHANGE UP (ref 2–14)
MONOCYTES NFR BLD AUTO: 5.6 % — SIGNIFICANT CHANGE UP (ref 2–14)
NEUTROPHILS # BLD AUTO: 8.28 K/UL — HIGH (ref 1.8–7.4)
NEUTROPHILS # BLD AUTO: 8.28 K/UL — HIGH (ref 1.8–7.4)
NEUTROPHILS NFR BLD AUTO: 74.3 % — SIGNIFICANT CHANGE UP (ref 43–77)
NEUTROPHILS NFR BLD AUTO: 74.3 % — SIGNIFICANT CHANGE UP (ref 43–77)
NRBC # BLD: 0 /100 WBCS — SIGNIFICANT CHANGE UP (ref 0–0)
NRBC # BLD: 0 /100 WBCS — SIGNIFICANT CHANGE UP (ref 0–0)
PHOSPHATE SERPL-MCNC: 3.6 MG/DL — SIGNIFICANT CHANGE UP (ref 2.5–4.5)
PHOSPHATE SERPL-MCNC: 3.6 MG/DL — SIGNIFICANT CHANGE UP (ref 2.5–4.5)
PLATELET # BLD AUTO: 253 K/UL — SIGNIFICANT CHANGE UP (ref 150–400)
PLATELET # BLD AUTO: 253 K/UL — SIGNIFICANT CHANGE UP (ref 150–400)
POTASSIUM SERPL-MCNC: 4.7 MMOL/L — SIGNIFICANT CHANGE UP (ref 3.5–5.3)
POTASSIUM SERPL-MCNC: 4.7 MMOL/L — SIGNIFICANT CHANGE UP (ref 3.5–5.3)
POTASSIUM SERPL-SCNC: 4.7 MMOL/L — SIGNIFICANT CHANGE UP (ref 3.5–5.3)
POTASSIUM SERPL-SCNC: 4.7 MMOL/L — SIGNIFICANT CHANGE UP (ref 3.5–5.3)
PROT SERPL-MCNC: 7.7 G/DL — SIGNIFICANT CHANGE UP (ref 6–8.3)
PROT SERPL-MCNC: 7.7 G/DL — SIGNIFICANT CHANGE UP (ref 6–8.3)
PROTHROM AB SERPL-ACNC: 12.9 SEC — SIGNIFICANT CHANGE UP (ref 9.5–13)
PROTHROM AB SERPL-ACNC: 12.9 SEC — SIGNIFICANT CHANGE UP (ref 9.5–13)
RBC # BLD: 4.07 M/UL — LOW (ref 4.2–5.8)
RBC # BLD: 4.07 M/UL — LOW (ref 4.2–5.8)
RBC # FLD: 14.6 % — HIGH (ref 10.3–14.5)
RBC # FLD: 14.6 % — HIGH (ref 10.3–14.5)
SODIUM SERPL-SCNC: 127 MMOL/L — LOW (ref 135–145)
SODIUM SERPL-SCNC: 127 MMOL/L — LOW (ref 135–145)
VANCOMYCIN TROUGH SERPL-MCNC: 15.5 UG/ML — SIGNIFICANT CHANGE UP (ref 10–20)
VANCOMYCIN TROUGH SERPL-MCNC: 15.5 UG/ML — SIGNIFICANT CHANGE UP (ref 10–20)
WBC # BLD: 11.15 K/UL — HIGH (ref 3.8–10.5)
WBC # BLD: 11.15 K/UL — HIGH (ref 3.8–10.5)
WBC # FLD AUTO: 11.15 K/UL — HIGH (ref 3.8–10.5)
WBC # FLD AUTO: 11.15 K/UL — HIGH (ref 3.8–10.5)

## 2023-11-24 PROCEDURE — 99292 CRITICAL CARE ADDL 30 MIN: CPT | Mod: 25

## 2023-11-24 PROCEDURE — 99232 SBSQ HOSP IP/OBS MODERATE 35: CPT | Mod: GC

## 2023-11-24 PROCEDURE — 71045 X-RAY EXAM CHEST 1 VIEW: CPT | Mod: 26

## 2023-11-24 PROCEDURE — 99233 SBSQ HOSP IP/OBS HIGH 50: CPT

## 2023-11-24 PROCEDURE — 99418 PROLNG IP/OBS E/M EA 15 MIN: CPT

## 2023-11-24 PROCEDURE — 99291 CRITICAL CARE FIRST HOUR: CPT | Mod: 25

## 2023-11-24 PROCEDURE — 99232 SBSQ HOSP IP/OBS MODERATE 35: CPT

## 2023-11-24 RX ORDER — VANCOMYCIN HCL 1 G
1500 VIAL (EA) INTRAVENOUS ONCE
Refills: 0 | Status: COMPLETED | OUTPATIENT
Start: 2023-11-25 | End: 2023-11-25

## 2023-11-24 RX ORDER — SODIUM CHLORIDE 5 G/100ML
150 INJECTION, SOLUTION INTRAVENOUS ONCE
Refills: 0 | Status: COMPLETED | OUTPATIENT
Start: 2023-11-24 | End: 2023-11-24

## 2023-11-24 RX ADMIN — Medication 250 MILLIGRAM(S): at 05:55

## 2023-11-24 RX ADMIN — POLYETHYLENE GLYCOL 3350 17 GRAM(S): 17 POWDER, FOR SOLUTION ORAL at 05:55

## 2023-11-24 RX ADMIN — ISOSORBIDE DINITRATE 40 MILLIGRAM(S): 5 TABLET ORAL at 12:38

## 2023-11-24 RX ADMIN — Medication 1: at 08:49

## 2023-11-24 RX ADMIN — BUDESONIDE AND FORMOTEROL FUMARATE DIHYDRATE 2 PUFF(S): 160; 4.5 AEROSOL RESPIRATORY (INHALATION) at 09:32

## 2023-11-24 RX ADMIN — Medication 1 MILLILITER(S): at 15:07

## 2023-11-24 RX ADMIN — Medication 81 MILLIGRAM(S): at 12:37

## 2023-11-24 RX ADMIN — CARVEDILOL PHOSPHATE 25 MILLIGRAM(S): 80 CAPSULE, EXTENDED RELEASE ORAL at 05:53

## 2023-11-24 RX ADMIN — SODIUM CHLORIDE 300 MILLILITER(S): 5 INJECTION, SOLUTION INTRAVENOUS at 17:18

## 2023-11-24 RX ADMIN — Medication 100 MILLIGRAM(S): at 08:48

## 2023-11-24 RX ADMIN — CHLORHEXIDINE GLUCONATE 1 APPLICATION(S): 213 SOLUTION TOPICAL at 22:58

## 2023-11-24 RX ADMIN — Medication 100 MILLIGRAM(S): at 15:07

## 2023-11-24 RX ADMIN — ISOSORBIDE DINITRATE 40 MILLIGRAM(S): 5 TABLET ORAL at 17:18

## 2023-11-24 RX ADMIN — Medication 8 UNIT(S): at 17:19

## 2023-11-24 RX ADMIN — SODIUM ZIRCONIUM CYCLOSILICATE 10 GRAM(S): 10 POWDER, FOR SUSPENSION ORAL at 05:53

## 2023-11-24 RX ADMIN — AMIODARONE HYDROCHLORIDE 200 MILLIGRAM(S): 400 TABLET ORAL at 05:53

## 2023-11-24 RX ADMIN — BUDESONIDE AND FORMOTEROL FUMARATE DIHYDRATE 2 PUFF(S): 160; 4.5 AEROSOL RESPIRATORY (INHALATION) at 21:01

## 2023-11-24 RX ADMIN — HEPARIN SODIUM 14 UNIT(S)/HR: 5000 INJECTION INTRAVENOUS; SUBCUTANEOUS at 05:53

## 2023-11-24 RX ADMIN — ATORVASTATIN CALCIUM 80 MILLIGRAM(S): 80 TABLET, FILM COATED ORAL at 22:57

## 2023-11-24 RX ADMIN — CARVEDILOL PHOSPHATE 25 MILLIGRAM(S): 80 CAPSULE, EXTENDED RELEASE ORAL at 17:19

## 2023-11-24 RX ADMIN — SENNA PLUS 2 TABLET(S): 8.6 TABLET ORAL at 22:57

## 2023-11-24 RX ADMIN — Medication 8 UNIT(S): at 12:38

## 2023-11-24 RX ADMIN — ISOSORBIDE DINITRATE 40 MILLIGRAM(S): 5 TABLET ORAL at 05:53

## 2023-11-24 RX ADMIN — INSULIN GLARGINE 12 UNIT(S): 100 INJECTION, SOLUTION SUBCUTANEOUS at 22:57

## 2023-11-24 RX ADMIN — Medication 8 UNIT(S): at 08:49

## 2023-11-24 RX ADMIN — Medication 1: at 12:38

## 2023-11-24 NOTE — PROGRESS NOTE ADULT - ASSESSMENT
58 yrs old male w/ hx of HFrEF (LVIDd 6.4 cm, LVEF 32%), CAD s/p PCI (2008), HTN, DMT2 (A1c 8.3%) and CVA s/p TPA (2018), recently treated for PNA who initially presented to [facility] via EMS after syncope reportedly requiring defibrilation. Treated for ACS but left AMA to come to [facility]. Pt has covid and was placed on IABP for hypotension. Now being evaluated for Heart transplant Vs LVAD placement. Nephrology consulted for ARIC

## 2023-11-24 NOTE — PROGRESS NOTE ADULT - SUBJECTIVE AND OBJECTIVE BOX
[facility] Division of Kidney Diseases & Hypertension  FOLLOW UP NOTE  [phone]--------------------------------------------------------------------------------  Chief Complaint:Non-ST elevation myocardial infarction (NSTEMI)        24 hour events/subjective:  No acute overnight events. No fresh complaints. Pt was seen at the bedside. Denies any headaches, fevers/chills, chest pain, palpitations, SOB, and leg swelling. Remained on IABP.         PAST HISTORY  --------------------------------------------------------------------------------  No significant changes to PMH, PSH, FHx, SHx, unless otherwise noted    ALLERGIES & MEDICATIONS  --------------------------------------------------------------------------------  Allergies    penicillins (Unknown)    Intolerances      Standing Inpatient Medications  aMIOdarone    Tablet 200 milliGRAM(s) Oral daily  aMIOdarone    Tablet   Oral   aspirin  chewable 81 milliGRAM(s) Oral daily  atorvastatin 80 milliGRAM(s) Oral at bedtime  budesonide  80 MICROgram(s)/formoterol 4.5 MICROgram(s) Inhaler 2 Puff(s) Inhalation two times a day  carvedilol 25 milliGRAM(s) Oral every 12 hours  chlorhexidine 2% Cloths 1 Application(s) Topical daily  dextrose 50% Injectable 25 Gram(s) IV Push once  dextrose 50% Injectable 12.5 Gram(s) IV Push once  heparin  Infusion 1600 Unit(s)/Hr IV Continuous <Continuous>  hepatitis A (virus) Vaccine - Adult (HAVRIX) 1 milliLiter(s) IntraMuscular once  hydrALAZINE 100 milliGRAM(s) Oral three times a day  insulin glargine Injectable (LANTUS) 12 Unit(s) SubCutaneous at bedtime  insulin lispro (ADMELOG) corrective regimen sliding scale   SubCutaneous at bedtime  insulin lispro (ADMELOG) corrective regimen sliding scale   SubCutaneous three times a day before meals  insulin lispro Injectable (ADMELOG) 8 Unit(s) SubCutaneous three times a day before meals  isosorbide   dinitrate Tablet (ISORDIL) 40 milliGRAM(s) Oral three times a day  polyethylene glycol 3350 17 Gram(s) Oral two times a day  senna 2 Tablet(s) Oral at bedtime  vancomycin  IVPB 1000 milliGRAM(s) IV Intermittent every 12 hours    PRN Inpatient Medications  hydrOXYzine hydrochloride 25 milliGRAM(s) Oral two times a day PRN      REVIEW OF SYSTEMS  --------------------------------------------------------------------------------  Per above.     VITALS/PHYSICAL EXAM  --------------------------------------------------------------------------------  T(C): 36.7 (11-24-23 @ 08:00), Max: 37.1 (11-23-23 @ 20:00)  HR: 70 (11-24-23 @ 09:00) (68 - 81)  BP: 105/69 (11-24-23 @ 06:00) (105/69 - 105/69)  RR: 26 (11-24-23 @ 09:00) (16 - 28)  SpO2: 99% (11-24-23 @ 09:00) (96% - 99%)  Wt(kg): --        11-23-23 @ 07:01  -  11-24-23 @ 07:00  --------------------------------------------------------  IN: 1526 mL / OUT: 2550 mL / NET: -1024 mL    11-24-23 @ 07:01  -  11-24-23 @ 10:17  --------------------------------------------------------  IN: 222 mL / OUT: 300 mL / NET: -78 mL      Physical Exam:  Gen: NAD  HEENT: MMM  Pulm: CTA B/L  CV: S1S2 +IABP  Abd: Soft, +BS   Ext: No LE edema B/L  Neuro: Awake  Skin: Warm and dry  Vascular access:  Rt IJ [place] gang cath was removed.   LABS/STUDIES  --------------------------------------------------------------------------------              12.0   11.15 >-----------<  253      [11-24-23 @ 06:15]              35.8     127  |  94  |  41  ----------------------------<  189      [11-24-23 @ 06:15]  4.7   |  20  |  1.52        Ca     10.0     [11-24-23 @ 06:15]      Mg     2.0     [11-24-23 @ 06:15]      Phos  3.6     [11-24-23 @ 06:15]    TPro  7.7  /  Alb  4.0  /  TBili  0.4  /  DBili  x   /  AST  21  /  ALT  41  /  AlkPhos  82  [11-24-23 @ 06:15]    PT/INR: PT 12.9 , INR 1.18       [11-24-23 @ 06:15]  PTT: 82.4       [11-24-23 @ 06:15]      Creatinine Trend:  SCr 1.52 [11-24 @ 06:15]  SCr 1.68 [11-23 @ 04:44]  SCr 1.52 [11-22 @ 04:16]  SCr 1.61 [11-21 @ 03:50]  SCr 1.79 [11-20 @ 00:09]    Urinalysis - [11-24-23 @ 06:15]      Color  / Appearance  / SG  / pH       Gluc 189 / Ketone   / Bili  / Urobili        Blood  / Protein  / Leuk Est  / Nitrite       RBC  / WBC  / Hyaline  / Gran  / Sq Epi  / Non Sq Epi  / Bacteria     Urine Creatinine 62      [11-21-23 @ 03:50]  Urine Protein <7      [11-21-23 @ 03:50]  Urine Sodium 88      [11-19-23 @ 04:24]  Urine Potassium 22      [11-19-23 @ 04:24]  Urine Phosphorus 39.3      [11-19-23 @ 04:24]  Urine Osmolality 431      [11-19-23 @ 04:24]        C3 Complement 171      [11-19-23 @ 02:54]  C4 Complement 45      [11-19-23 @ 02:54]  ANCA: cANCA Negative, pANCA Negative, atypical ANCA Negative      [11-21-23 @ 03:50]  anti-GBM <0.2      [11-19-23 @ 02:54]  PLA2R: JACEK <1.8, IFA --      [11-19-23 @ 02:54]  Free Light Chains: kappa 3.76, lambda 3.25, ratio = 1.16      [11-19 @ 02:54]  Immunofixation Serum:   No Monoclonal Band Identified      Reference Range: None Detected      [11-19-23 @ 02:54]

## 2023-11-24 NOTE — PROGRESS NOTE ADULT - ATTENDING COMMENTS
# ARIC -  Serum creatinine is stable at 1.6-1.7mg/dL. No indication for dialysis.    # Hyponatremia - limit water intake to < 1 Liter. Patient already trying to limit fluid. To consider salt tabs 1G BID if heart failure team and CICU team is agreeable. If not, we can consider Urena 15g BID.     # Hyperkalemia - for lokelma. Low K diet.       The rest of the recommendations as per fellow's note.    Suzanne Jackson MD  Attending Nephrologist  [phone] or via [employer]

## 2023-11-24 NOTE — PROGRESS NOTE ADULT - SUBJECTIVE AND OBJECTIVE BOX
DEVORAH VALENCIA  MRN-[id number]  Patient is a 58y old  Male who presents with a chief complaint of advanced cardiac support (2023 14:06)    HPI:  pt seen and approx 1:30 pm  in [facility]    59yo M w/ hx HTN, CAD w/ 1 stent in , ICH () presenting with abn ekg. Patient presented to [facility] where he was found to have STEMI, recommended to get cath however patient did not want to get it there so it left and came here.  Patient initially had cough, congestion, fever, was placed on antibiotics on .  Started feeling nauseous and had a presyncopal event after which he presented to ED last night.  Had chest pain as well.  Chest pain is midsternal.  Not currently having chest pain.  Received 4 aspirin 30 min pta. (2023 15:11)    Hospital Course:  24 HOUR EVENTS:  REVIEW OF SYSTEMS:    CONSTITUTIONAL: No weakness, fevers or chills  EYES/ENT: No visual changes;  No vertigo or throat pain   NECK: No pain or stiffness  RESPIRATORY: No cough, wheezing, hemoptysis; No shortness of breath  CARDIOVASCULAR: No chest pain or palpitations  GASTROINTESTINAL: No abdominal or epigastric pain. No nausea, vomiting, or hematemesis; No diarrhea or constipation. No melena or hematochezia.  GENITOURINARY: No dysuria, frequency or hematuria  NEUROLOGICAL: No numbness or weakness  SKIN: No itching, rashes    ICU Vital Signs Last 24 Hrs  T(C): 36.9 (2023 19:00), Max: 37.1 (2023 06:00)  T(F): 98.4 (2023 19:00), Max: 98.7 (2023 06:00)  HR: 78 (2023 19:00) (68 - 85)  BP: 105/69 (2023 06:00) (105/69 - 105/69)  BP(mean): 82 (2023 06:00) (82 - 82)  ABP: --  ABP(mean): --  RR: 23 (2023 19:00) (13 - 34)  SpO2: 99% (2023 19:00) (95% - 99%)    O2 Parameters below as of 2023 19:00  Patient On (Oxygen Delivery Method): room air      CVP(mm Hg): --  CO: --  CI: --  PA: --  PA(mean): --  PA(direct): --  PCWP: --  LA: --  RA: --  SVR: --  SVRI: --  PVR: --  PVRI: --  I&O's Summary    2023 07:  -  2023 07:00  --------------------------------------------------------  IN: 1526 mL / OUT: 2550 mL / NET: -1024 mL    2023 07:  -  2023 20:31  --------------------------------------------------------  IN: 862 mL / OUT: 900 mL / NET: -38 mL      CAPILLARY BLOOD GLUCOSE    CAPILLARY BLOOD GLUCOSE      POCT Blood Glucose.: 148 mg/dL (2023 17:16)    PHYSICAL EXAM:  GENERAL: No acute distress, well-developed  HEAD:  Atraumatic, Normocephalic  EYES: EOMI, PERRLA, conjunctiva and sclera clear  NECK: Supple, no lymphadenopathy, no JVD  CHEST/LUNG: CTAB; No wheezes, rales, or rhonchi  HEART: Regular rate and rhythm. Normal S1/S2. No murmurs, rubs, or gallops  ABDOMEN: Soft, non-tender, non-distended; normal bowel sounds, no organomegaly  EXTREMITIES:  2+ peripheral pulses b/l, No clubbing, cyanosis, or edema  NEUROLOGY: A&O x 3, no focal deficits  SKIN: No rashes or lesions  ============================I/O===========================   I&O's Detail    2023 07:01  -  2023 07:00  --------------------------------------------------------  IN:    Heparin: 336 mL    IV PiggyBack: 500 mL    Oral Fluid: 690 mL  Total IN: 1526 mL    OUT:    Voided (mL): 2550 mL  Total OUT: 2550 mL    Total NET: -1024 mL      2023 07:01  -  2023 20:31  --------------------------------------------------------  IN:    Heparin: 182 mL    Oral Fluid: 680 mL  Total IN: 862 mL    OUT:    Voided (mL): 900 mL  Total OUT: 900 mL    Total NET: -38 mL      ============================ LABS =========================                        12.0   11.15 )-----------( 253      ( 2023 06:15 )             35.8     -    127<L>  |  94<L>  |  41<H>  ----------------------------<  189<H>  4.7   |  20<L>  |  1.52<H>    Ca    10.0      2023 06:15  Phos  3.6       Mg     2.0         TPro  7.7  /  Alb  4.0  /  TBili  0.4  /  DBili  x   /  AST  21  /  ALT  41  /  AlkPhos  82          LIVER FUNCTIONS - ( 2023 06:15 )  Alb: 4.0 g/dL / Pro: 7.7 g/dL / ALK PHOS: 82 U/L / ALT: 41 U/L / AST: 21 U/L / GGT: x           PT/INR - ( 2023 06:15 )   PT: 12.9 sec;   INR: 1.18 ratio         PTT - ( 2023 06:15 )  PTT:82.4 sec      Urinalysis Basic - ( 2023 06:15 )    Color: x / Appearance: x / SG: x / pH: x  Gluc: 189 mg/dL / Ketone: x  / Bili: x / Urobili: x   Blood: x / Protein: x / Nitrite: x   Leuk Esterase: x / RBC: x / WBC x   Sq Epi: x / Non Sq Epi: x / Bacteria: x      ======================Micro/Rad/Cardio=================  Telemtry: Reviewed   EKG: Reviewed  CXR: Reviewed  Culture: Reviewed   Echo:   Cath: Cardiac Cath Lab - Adult:   [facility]  Department of Cardiology  [address] [place] [address]  [phone]  Cath Lab Report -- Comprehensive Report  Patient: LD VALENCIA  Study date: 2017  Account number: [id number]  MR number: [id number]  : 1965  Gender: Male  Race: W  Case Physician(s):  Jairon Holguin M.D.  Referring Physician:  Luc Lynn M.D.  INDICATIONS: Unstable angina - CCS4.  HISTORY: The patient has a history of coronary artery disease. The patient  hashypertension and medication-treated dyslipidemia.  PROCEDURE:  --  Left heart catheterization with ventriculography.  --  Left coronary angiography.  --  Right coronary angiography.  TECHNIQUE: The risks and alternatives of the procedures and conscious  sedation were explained to the patient and informed consent was obtained.  Cardiac catheterization performed electively.  Local anesthetic given. Right radial artery access. A 6FR PRELUDE KIT was  inserted in the vessel. Left heart catheterization. Ventriculography was  performed. A 5FR FR4.0 EXPO catheter was utilized. Left coronary artery  angiography. The vessel was injected utilizing a 5FR FL3.5 EXPO catheter.  Right coronary artery angiography. The vessel was injected utilizing a 5FR  FR4.0 EXPO catheter. RADIATION EXPOSURE: 1.1 min.  CONTRAST GIVEN: Omnipaque 55 ml.  MEDICATIONS GIVEN: Midazolam, 1 mg, IV. Fentanyl, 25 mcg, IV. Verapamil  (Isoptin, Calan, Covera), 2.5 mg, IA. Heparin, 3000 units, IA.  VENTRICLES: Global left ventricular function was moderately depressed. EF  estimated was 40 %.  CORONARY VESSELS: The coronary circulation is right dominant.  LM:   --  LM: Normal.  LAD:   --  Proximal LAD: There was a 50 % stenosis.  CX:   --  Circumflex: Normal.  RCA:   --  Mid RCA: There was a 40 % stenosis.  --  Distal RCA: There was a 50 % stenosis.  COMPLICATIONS: There were no complications.  DIAGNOSTIC RECOMMENDATIONS: The patient should continue with the present  medications.  Prepared and signed by  Jairon Holguin M.D.  Signed 2017 12:20:13  HEMODYNAMIC TABLES  Pressures:  Baseline/ Room Air  Pressures:  - HR: 78  Pressures:  - Rhythm:  Pressures:  -- Aortic Pressure (S/D/M): --/--/99  Pressures:  -- Left Ventricle (s/edp): 157/39/--  Outputs:  Baseline/ Room Air  Outputs:  -- CALCULATIONS: Age in years: 51.41  Outputs:  -- CALCULATIONS: Body Surface Area: 2.05  Outputs:  -- CALCULATIONS: Height in cm: 175.00  Outputs:  -- CALCULATIONS: Sex: Male  Outputs:  -- CALCULATIONS: Weight in k.40 (17 @ 21:55)    ======================================================  PAST MEDICAL & SURGICAL HISTORY:  HTN (hypertension)    CAD (coronary artery disease)  ; stent    Intracranial hemorrhage      Respiratory arrest      Myocardial infarction, unspecified MI type, unspecified artery    History of coronary artery stent placement    Assessment and Plan:   · Assessment    ====================ASSESSMENT ==============  58 male with HTN, CAD (s/p PCI ), HFrEF, CVA , and T2DM presenting with chest pressure and unknown tachycardia that was shocked x1, [facility]  found to have in-stent restenosis of pLAD and  of RCA with elevated RA and PA pressures and severely decreased EF 32%. Admitted to CICU for management of cardiogenic shock and ADHF requiring IABP  -, with hospital course c/b vfib arrest requiring reinsertion of IABP. Currently undergoing workup for AT evaluation with HF.    Plan:  ====================== NEUROLOGY=====================  # Anxiety  - No Seroquel/antipsychotics since vfib arrest and prolonged QTC  - Psych Eval, recommended SSRI, but pt. refused   - Psych recommending atarax PRN  - Continue to monitor mental status    # PT/Conditioning  - Continue band exercised while on bedrest s/t IABP, IS    ==================== RESPIRATORY======================  # Acute Hypoxemic Respiratory Failure  - s/p x2 intubations for cardiogenic pulm edema and the in setting of cardiac arrest, resolved - extubated 11/10  - Currently on room air with spO2 mid 90s  - Continue incentive spirometry and monitoring of sp02    # Asthma  - c/w albuterol, symbicort and spiriva  - On trelegy at home    ====================CARDIOVASCULAR====================  # Vfib arrest iso ischemia  - Lido gtt off   - PO Amio load - total of 5g per EP complete , continue PO amio  - Keep K > 4, Mag > 2.2     # Cardiogenic shock requiring IABP (- , -)  - Likely 2/2 NSTEMI and ADHF  -  LHC: pLAD 100 % in-stent restenosis & mRCA, 100 %. PCWP 30. IABP placed.  -  TTE: LV dilated. EF 32 %. Regional WMAs present, mod (grade 2) LV diastolic dysfunction  -  TTE: EF 22% and + LV thrombus  - PRN Bumex IVP as needed  - IABP swapped  to RFA, continue 1:1 support  - Off Milrinone gtt @ 7:30 am   - Currently on hydralazine 100 TID and ISD 40 TID for AL reduction  - Continue romel 25 daily and coreg 25 BID for GDMT  - Listed OHT status 2 , f/u HF recs    # NSTEMI iso stent re-occlusion of pLAD and 100%  of RCA  - EKG on admission w/LBBB  - DAPT: c/w ASA, Brilinta d/c'd per transplant w/u  - c/w lipitor 80  - cMR deferred given necessity of IABP  - CT sx not recommending CABG, undergoing AT eval    # LV thrombus  - c/w heparin gtt    ===================== RENAL =========================  # Non-oliguric ARIC   - sCr stable, Baseline Cr: 1-  - Renal US - no evidence of renal artery stenosis  - Trend BMP, lytes daily, replace as needed  - Continue Strict I/Os, avoid nephrotoxins    # Mild Hyponatremia/Hyperkalemia  - iso ARIC and or new CKD baseline  - Assess volume status and consider diuresis if hypervolemic   - 1.2L Fluid restriction, gtts in NS where applicable   - Low K diet   - Repeat CMP today    ==================== GASTROINTESTINAL===================  - c/w diet  - continue to monitor for BM  - c/w bowel regimen    ===================HEMATOLOGIC/ONC ===================  # VTE prophylaxis   - c/w heparin gtt given LV Thrombus and IABP  - H/H and platelets stable    =======================    ENDOCRINE  =====================  # Type 2 DM  - A1c 8.3, glucose   - Continue lantus, premeal, low ISS    ========================INFECTIOUS DISEASE================  # Enterococcus faecalis bacteremia  - BCx + for enterococcus faecalis x2, Staph epi x1 (likely contaminant)- pan sensitive   - Urine cx  + enterococcus faecalis  - BCx  NGTD  - IABP site swapped to RFA   - continue vancomycin 1g q12h (-  - CT A/P negative for infectious pathology    # COVID, resolved  - Off airborne precautions     # Pre-transplant ID w/u   - Trend ID recs for serologies   - Colonoscopy  - normal   - Chest CT  - improved LLL aeration  - Pt. requiring vaccines prior to transplant, would initiate series accordingly        Patient requires continuous monitoring with bedside rhythm monitoring, pulse ox monitoring, and intermittent blood gas analysis. Care plan discussed with ICU care team. Patient remained critical and at risk for life threatening decompensation.  Patient seen, examined and plan discussed with CCU team during rounds.   I have personally provided ____ minutes of critical care time excluding time spent on separate procedures, in addition to initial critical care time provided by the CICU Attending, Dr. Lees  By signing my name below, I, Eleonora Marshall, attest that this documentation has been prepared under the direction and in the presence of Kirsty Davis NP   Electronically signed: Rosalba Talavera, 23 @ 20:31  IKirsty NP, personally performed the services described in this documentation. all medical record entries made by the eileenibmartha were at my direction and in my presence. I have reviewed the chart and agree that the record reflects my personal performance and is accurate and complete  Electronically signed: Kirsty Davis NP  DEVORAH VALENCIA  MRN-[id number]  Patient is a 58y old  Male who presents with a chief complaint of advanced cardiac support (2023 14:06)    HPI:  pt seen and approx 1:30 pm  in [facility]    57yo M w/ hx HTN, CAD w/ 1 stent in , ICH () presenting with abn ekg. Patient presented to [facility] where he was found to have STEMI, recommended to get cath however patient did not want to get it there so it left and came here.  Patient initially had cough, congestion, fever, was placed on antibiotics on .  Started feeling nauseous and had a presyncopal event after which he presented to ED last night.  Had chest pain as well.  Chest pain is midsternal.  Not currently having chest pain.  Received 4 aspirin 30 min pta. (2023 15:11)    Hospital Course:  24 HOUR EVENTS:  REVIEW OF SYSTEMS:    CONSTITUTIONAL: No weakness, fevers or chills  EYES/ENT: No visual changes;  No vertigo or throat pain   NECK: No pain or stiffness  RESPIRATORY: No cough, wheezing, hemoptysis; No shortness of breath  CARDIOVASCULAR: No chest pain or palpitations  GASTROINTESTINAL: No abdominal or epigastric pain. No nausea, vomiting, or hematemesis; No diarrhea or constipation. No melena or hematochezia.  GENITOURINARY: No dysuria, frequency or hematuria  NEUROLOGICAL: No numbness or weakness  SKIN: No itching, rashes    ICU Vital Signs Last 24 Hrs  T(C): 36.9 (2023 19:00), Max: 37.1 (2023 06:00)  T(F): 98.4 (2023 19:00), Max: 98.7 (2023 06:00)  HR: 78 (2023 19:00) (68 - 85)  BP: 105/69 (2023 06:00) (105/69 - 105/69)  BP(mean): 82 (2023 06:00) (82 - 82)  ABP: --  ABP(mean): --  RR: 23 (2023 19:00) (13 - 34)  SpO2: 99% (2023 19:00) (95% - 99%)    O2 Parameters below as of 2023 19:00  Patient On (Oxygen Delivery Method): room air    I&O's Summary    :  -  2023 07:00  --------------------------------------------------------  IN: 1526 mL / OUT: 2550 mL / NET: -1024 mL    :  -  2023 20:31  --------------------------------------------------------  IN: 862 mL / OUT: 900 mL / NET: -38 mL      CAPILLARY BLOOD GLUCOSE    CAPILLARY BLOOD GLUCOSE      POCT Blood Glucose.: 148 mg/dL (2023 17:16)    PHYSICAL EXAM:  GENERAL: No acute distress, well-developed  HEAD:  Atraumatic, Normocephalic  EYES: EOMI, PERRLA, conjunctiva and sclera clear  NECK: Supple, no lymphadenopathy, no JVD  CHEST/LUNG: CTAB; No wheezes, rales, or rhonchi  HEART: Regular rate and rhythm. Normal S1/S2. No murmurs, rubs, or gallops  ABDOMEN: Soft, non-tender, non-distended; normal bowel sounds, no organomegaly  EXTREMITIES:  2+ peripheral pulses b/l, No clubbing, cyanosis, or edema  NEUROLOGY: A&O x 3, no focal deficits  SKIN: No rashes or lesions  ============================I/O===========================   I&O's Detail     07:00  --------------------------------------------------------  IN:    Heparin: 336 mL    IV PiggyBack: 500 mL    Oral Fluid: 690 mL  Total IN: 1526 mL    OUT:    Voided (mL): 2550 mL  Total OUT: 2550 mL    Total NET: -1024 mL      2023 07:01  -  2023 20:31  --------------------------------------------------------  IN:    Heparin: 182 mL    Oral Fluid: 680 mL  Total IN: 862 mL    OUT:    Voided (mL): 900 mL  Total OUT: 900 mL    Total NET: -38 mL      ============================ LABS =========================                        12.0   15 )-----------( 253      ( 2023 06:15 )             35.8     11-    127<L>  |  94<L>  |  41<H>  ----------------------------<  189<H>  4.7   |  20<L>  |  1.52<H>    Ca    10.0      2023 06:15  Phos  3.6       Mg     2.0         TPro  7.7  /  Alb  4.0  /  TBili  0.4  /  DBili  x   /  AST  21  /  ALT  41  /  AlkPhos  82  11-24        LIVER FUNCTIONS - ( 2023 06:15 )  Alb: 4.0 g/dL / Pro: 7.7 g/dL / ALK PHOS: 82 U/L / ALT: 41 U/L / AST: 21 U/L / GGT: x           PT/INR - ( 2023 06:15 )   PT: 12.9 sec;   INR: 1.18 ratio         PTT - ( 2023 06:15 )  PTT:82.4 sec      Urinalysis Basic - ( 2023 06:15 )    Color: x / Appearance: x / SG: x / pH: x  Gluc: 189 mg/dL / Ketone: x  / Bili: x / Urobili: x   Blood: x / Protein: x / Nitrite: x   Leuk Esterase: x / RBC: x / WBC x   Sq Epi: x / Non Sq Epi: x / Bacteria: x      ======================Micro/Rad/Cardio=================  Telemtry: Reviewed   EKG: Reviewed  CXR: Reviewed  Culture: Reviewed   Echo:   Cath: Cardiac Cath Lab - Adult:   [facility]  Department of Cardiology  [address] [place] [address] [id number] [phone]  Cath Lab Report -- Comprehensive Report  Patient: LD VALENCIA  Study date: 2017  Account number: [id number]  MR number: [id number]  : 1965  Gender: Male  Race: W  Case Physician(s):  Jairon Holguin M.D.  Referring Physician:  Luc Lynn M.D.  INDICATIONS: Unstable angina - CCS4.  HISTORY: The patient has a history of coronary artery disease. The patient  hashypertension and medication-treated dyslipidemia.  PROCEDURE:  --  Left heart catheterization with ventriculography.  --  Left coronary angiography.  --  Right coronary angiography.  TECHNIQUE: The risks and alternatives of the procedures and conscious  sedation were explained to the patient and informed consent was obtained.  Cardiac catheterization performed electively.  Local anesthetic given. Right radial artery access. A 6FR PRELUDE KIT was  inserted in the vessel. Left heart catheterization. Ventriculography was  performed. A 5FR FR4.0 EXPO catheter was utilized. Left coronary artery  angiography. The vessel was injected utilizing a 5FR FL3.5 EXPO catheter.  Right coronary artery angiography. The vessel was injected utilizing a 5FR  FR4.0 EXPO catheter. RADIATION EXPOSURE: 1.1 min.  CONTRAST GIVEN: Omnipaque 55 ml.  MEDICATIONS GIVEN: Midazolam, 1 mg, IV. Fentanyl, 25 mcg, IV. Verapamil  (Isoptin, Calan, Covera), 2.5 mg, IA. Heparin, 3000 units, IA.  VENTRICLES: Global left ventricular function was moderately depressed. EF  estimated was 40 %.  CORONARY VESSELS: The coronary circulation is right dominant.  LM:   --  LM: Normal.  LAD:   --  Proximal LAD: There was a 50 % stenosis.  CX:   --  Circumflex: Normal.  RCA:   --  Mid RCA: There was a 40 % stenosis.  --  Distal RCA: There was a 50 % stenosis.  COMPLICATIONS: There were no complications.  DIAGNOSTIC RECOMMENDATIONS: The patient should continue with the present  medications.  Prepared and signed by  Jairon Holguin M.D.  Signed 2017 12:20:13  HEMODYNAMIC TABLES  Pressures:  Baseline/ Room Air  Pressures:  - HR: 78  Pressures:  - Rhythm:  Pressures:  -- Aortic Pressure (S/D/M): --/--/99  Pressures:  -- Left Ventricle (s/edp): 157/39/--  Outputs:  Baseline/ Room Air  Outputs:  -- CALCULATIONS[age]  Outputs:  -- CALCULATIONS: Body Surface Area: 2.05  Outputs:  -- CALCULATIONS: Height in cm: 175.00  Outputs:  -- CALCULATIONS: Sex: Male  Outputs:  -- CALCULATIONS: Weight in k.40 (17 @ 21:55)    ======================================================  PAST MEDICAL & SURGICAL HISTORY:  HTN (hypertension)    CAD (coronary artery disease)  ; stent    Intracranial hemorrhage      Respiratory arrest      Myocardial infarction, unspecified MI type, unspecified artery    History of coronary artery stent placement    Assessment and Plan:   · Assessment    ====================ASSESSMENT ==============  58 male with HTN, CAD (s/p PCI ), HFrEF, CVA , and T2DM presenting with chest pressure and unknown tachycardia that was shocked x1, [facility]  found to have in-stent restenosis of pLAD and  of RCA with elevated RA and PA pressures and severely decreased EF 32%. Admitted to CICU for management of cardiogenic shock and ADHF requiring IABP  -, with hospital course c/b vfib arrest requiring reinsertion of IABP. Currently undergoing workup for AT evaluation with HF.    Plan:  ====================== NEUROLOGY=====================  # Anxiety  - No Seroquel/antipsychotics since vfib arrest and prolonged QTC  - Psych Eval, recommended SSRI, but pt. refused   - Psych recommending atarax PRN  - Continue to monitor mental status    # PT/Conditioning  - Continue band exercised while on bedrest s/t IABP, IS    ==================== RESPIRATORY======================  # Acute Hypoxemic Respiratory Failure  - s/p x2 intubations for cardiogenic pulm edema and the in setting of cardiac arrest, resolved - extubated 11/10  - Currently on room air with spO2 mid 90s  - Continue incentive spirometry and monitoring of sp02    # Asthma  - c/w albuterol, symbicort and spiriva  - On trelegy at home    ====================CARDIOVASCULAR====================  # Vfib arrest iso ischemia  - Lido gtt off   - PO Amio load - total of 5g per EP complete , continue PO amio  - Keep K > 4, Mag > 2.2     # Cardiogenic shock requiring IABP (- , -)  - Likely 2/2 NSTEMI and ADHF  -  LHC: pLAD 100 % in-stent restenosis & mRCA, 100 %. PCWP 30. IABP placed.  -  TTE: LV dilated. EF 32 %. Regional WMAs present, mod (grade 2) LV diastolic dysfunction  -  TTE: EF 22% and + LV thrombus  - PRN Bumex IVP as needed  - IABP swapped  to RFA, continue 1:1 support  - Off Milrinone gtt @ 7:30 am   - Currently on hydralazine 100 TID and ISD 40 TID for AL reduction  - Continue romel 25 daily and coreg 25 BID for GDMT  - Listed OHT status 2 , f/u HF recs    # NSTEMI iso stent re-occlusion of pLAD and 100%  of RCA  - EKG on admission w/LBBB  - DAPT: c/w ASA, Brilinta d/c'd per transplant w/u  - c/w lipitor 80  - cMR deferred given necessity of IABP  - CT sx not recommending CABG, undergoing AT eval    # LV thrombus  - c/w heparin gtt    ===================== RENAL =========================  # Non-oliguric ARIC   - sCr stable, Baseline Cr: 1-1.  - Renal US - no evidence of renal artery stenosis  - Trend BMP, lytes daily, replace as needed  - Continue Strict I/Os, avoid nephrotoxins    # Mild Hyponatremia/Hyperkalemia  - iso ARIC and or new CKD baseline  - Assess volume status and consider diuresis if hypervolemic   - 1.2L Fluid restriction, gtts in NS where applicable   - Low K diet   - Repeat CMP today    ==================== GASTROINTESTINAL===================  - c/w diet  - continue to monitor for BM  - c/w bowel regimen    ===================HEMATOLOGIC/ONC ===================  # VTE prophylaxis   - c/w heparin gtt given LV Thrombus and IABP  - H/H and platelets stable    =======================    ENDOCRINE  =====================  # Type 2 DM  - A1c 8.3, glucose   - Continue lantus, premeal, low ISS    ========================INFECTIOUS DISEASE================  # Enterococcus faecalis bacteremia  - BCx + for enterococcus faecalis x2, Staph epi x1 (likely contaminant)- pan sensitive   - Urine cx  + enterococcus faecalis  - BCx  NGTD  - IABP site swapped to RFA   - continue vancomycin 1g q12h (-  - CT A/P negative for infectious pathology    # COVID, resolved  - Off airborne precautions     # Pre-transplant ID w/u   - Trend ID recs for serologies   - Colonoscopy  - normal   - Chest CT  - improved LLL aeration  - Pt. requiring vaccines prior to transplant, would initiate series accordingly        Patient requires continuous monitoring with bedside rhythm monitoring, pulse ox monitoring, and intermittent blood gas analysis. Care plan discussed with ICU care team. Patient remained critical and at risk for life threatening decompensation.  Patient seen, examined and plan discussed with CCU team during rounds.   I have personally provided __30__ minutes of critical care time excluding time spent on separate procedures, in addition to initial critical care time provided by the CICU Attending, Dr. Lees  By signing my name below, I, Eleonora Marshall, attest that this documentation has been prepared under the direction and in the presence of Kirsty Davis NP   Electronically signed: Rosalba Talavera, 23 @ 20:31  I, Kirsty Davis NP, personally performed the services described in this documentation. all medical record entries made by the eileenibe were at my direction and in my presence. I have reviewed the chart and agree that the record reflects my personal performance and is accurate and complete  Electronically signed: Kirsty Davis NP

## 2023-11-24 NOTE — PROGRESS NOTE ADULT - SUBJECTIVE AND OBJECTIVE BOX
LD VALENCIA  58y Male  MRN:[id number]    Patient is a 58y old  Male who presents with a chief complaint of NSTEMI (01 Nov 2023 20:29)    HPI:  57yo M w/ hx HTN, CAD w/ 1 stent in 2009, ICH (2008) presenting with abn ekg. Patient presented to [facility] where he was found to have STEMI, recommended to get cath however patient did not want to get it there so it left and came here.  Patient initially had cough, congestion, fever, was placed on antibiotics on Sunday.  Started feeling nauseous and had a presyncopal event after which he presented to ED last night.  Had chest pain as well.  Chest pain is midsternal.  Not currently having chest pain.  Received 4 aspirin 30 min pta. (01 Nov 2023 15:11)      Patient seen and evaluated at bedside in CCU. interval events noted    Interval HPI: remain in ccu. IABP in place   no acute events o/n        PAST MEDICAL & SURGICAL HISTORY:  HTN (hypertension)      CAD (coronary artery disease)  2009; stent      Intracranial hemorrhage  2008      Respiratory arrest  december 1st      Myocardial infarction, unspecified MI type, unspecified artery      History of coronary artery stent placement          REVIEW OF SYSTEMS:  as per hpi     VITALS:   ICU Vital Signs Last 24 Hrs  T(C): 36.7 (24 Nov 2023 12:00), Max: 37.1 (23 Nov 2023 20:00)  T(F): 98.1 (24 Nov 2023 12:00), Max: 98.8 (23 Nov 2023 20:00)  HR: 85 (24 Nov 2023 13:00) (68 - 85)  BP: 105/69 (24 Nov 2023 06:00) (105/69 - 105/69)  BP(mean): 82 (24 Nov 2023 06:00) (82 - 82)  ABP: --  ABP(mean): --  RR: 22 (24 Nov 2023 13:00) (13 - 29)  SpO2: 96% (24 Nov 2023 13:00) (95% - 99%)    O2 Parameters below as of 24 Nov 2023 13:00  Patient On (Oxygen Delivery Method): room air                PHYSICAL EXAM:  GENERAL: NAD, comfortable   HEAD:  Atraumatic, Normocephalic  EYES: EOMI, PERRLA, conjunctiva and sclera clear  NECK: Supple, No JVD   CHEST/LUNG: Clear to auscultation bilaterally; No wheeze  HEART: Regular rate and rhythm; No murmurs, rubs, or gallops  ABDOMEN: Soft, Nontender, Nondistended; Bowel sounds present  EXTREMITIES:  2+ Peripheral Pulses, No clubbing, cyanosis, or edema  NEUROLOGY: nonfocal  SKIN: No rashes or lesions  +iabp    Consultant(s) Notes Reviewed:  [x ] YES  [ ] NO  Care Discussed with Consultants/Other Providers [ x] YES  [ ] NO    MEDS:   MEDICATIONS  (STANDING):  aMIOdarone    Tablet 200 milliGRAM(s) Oral daily  aMIOdarone    Tablet   Oral   aspirin  chewable 81 milliGRAM(s) Oral daily  atorvastatin 80 milliGRAM(s) Oral at bedtime  budesonide  80 MICROgram(s)/formoterol 4.5 MICROgram(s) Inhaler 2 Puff(s) Inhalation two times a day  carvedilol 25 milliGRAM(s) Oral every 12 hours  chlorhexidine 2% Cloths 1 Application(s) Topical daily  dextrose 50% Injectable 12.5 Gram(s) IV Push once  dextrose 50% Injectable 25 Gram(s) IV Push once  heparin  Infusion 1600 Unit(s)/Hr (14 mL/Hr) IV Continuous <Continuous>  hepatitis A (virus) Vaccine - Adult (HAVRIX) 1 milliLiter(s) IntraMuscular once  hydrALAZINE 100 milliGRAM(s) Oral three times a day  insulin glargine Injectable (LANTUS) 12 Unit(s) SubCutaneous at bedtime  insulin lispro (ADMELOG) corrective regimen sliding scale   SubCutaneous at bedtime  insulin lispro (ADMELOG) corrective regimen sliding scale   SubCutaneous three times a day before meals  insulin lispro Injectable (ADMELOG) 8 Unit(s) SubCutaneous three times a day before meals  isosorbide   dinitrate Tablet (ISORDIL) 40 milliGRAM(s) Oral three times a day  polyethylene glycol 3350 17 Gram(s) Oral two times a day  senna 2 Tablet(s) Oral at bedtime    MEDICATIONS  (PRN):  hydrOXYzine hydrochloride 25 milliGRAM(s) Oral two times a day PRN Anxiety      ALLERGIES:  penicillins (Unknown)      LABS:                                                 12.0   11.15 )-----------( 253      ( 24 Nov 2023 06:15 )             35.8   11-24    127<L>  |  94<L>  |  41<H>  ----------------------------<  189<H>  4.7   |  20<L>  |  1.52<H>    Ca    10.0      24 Nov 2023 06:15  Phos  3.6     11-24  Mg     2.0     11-24    TPro  7.7  /  Alb  4.0  /  TBili  0.4  /  DBili  x   /  AST  21  /  ALT  41  /  AlkPhos  82  11-24      < from: Colonoscopy (11.17.23 @ 10:35) >                                                                                                        Impression:          - The entire examined colon is normal on direct and retroflexion views.                       - No specimens collected.  Recommendation:      - Resume previous diet today.                       - No large polyps or masses detected, No objection from GI standpoint to                 proceed with heart transplantation/advanced heart therapies.                       - Please call back should any further questions or concerns arise.    < end of copied text >     < from: Xray Chest 1 View- PORTABLE-Urgent (11.01.23 @ 07:42) >    IMPRESSION:  Clear lungs.    ---End of Report ---        < end of copied text >  < from: TTE W or WO Ultrasound Enhancing Agent (11.01.23 @ 10:23) >  _____________________________     CONCLUSIONS:      1. Left ventricular cavity is moderately dilated. Left ventricular wall thickness is normal. Left ventricular systolic function is severely decreased with an ejection fraction of 32 % by Chinchilla's method of disks. Regional wall motion abnormalities present.   2. Multiple segmental abnormalities exist. See findings.   3. There is moderate (grade 2) left ventricular diastolic dysfunction, with indeterminant filling pressure.   4. Normal right ventricular cavity size, wall thickness, and systolic function.   5. No significant valvular disease.   6. No pericardial effusion seen.   7. Compared to the transthoracic echocardiogram performed on 1/25/2017 the areas of akinesis are unchanged but there has been a decline in LV systolic function with new areas of hypokinesis.    __________________________________________________________________    < end of copied text >  < from: TTE Limited W or WO Ultrasound Enhancing Agent (11.02.23 @ 07:41) >  __________________________     CONCLUSIONS:      1. After obtaining consent, Definity ultrasound enhancing agent was given for enhanced left ventricular opacification and improved delineation of the left ventricular endocardial borders. Left ventricular systolic function is severely decreased with a calculated ejection fraction of 22 % by the Chinchilla's biplane method of disks. There is a left ventricular thrombus.   2. Findings were discussed with Litzy BOSS on 11/2/2023 at 8.49am.   3. There is a left ventricular thrombus.    _________________________________________________________________    < end of copied text >

## 2023-11-24 NOTE — PROGRESS NOTE ADULT - ASSESSMENT
====================ASSESSMENT ==============  58 male with HTN, CAD (s/p PCI 2008), HFrEF, CVA 2018, and T2DM presenting with chest pressure and unknown tachycardia that was shocked x1, [facility] 11/1 found to have in-stent restenosis of pLAD and  of RCA with elevated RA and PA pressures and severely decreased EF 32%, admitted to CICU for management of cardiogenic shock requiring IABP 11/1 -11/7, with hospital course c/b vfib arrest requiring reinsertion of IABP. Currently undergoing workup for AT evaluation with HF.    Plan:  ====================== NEUROLOGY=====================  # Anxiety  - No Seroquel/antipsychotics since vfib arrest and prolonged QTC  - Psych Eval, recommended SSRI, but pt. refused   - Psych recommending atarax PRN  - Continue to monitor mental status    # PT/Conditioning  - Continue band exercised while on bedrest s/t IABP, IS    ==================== RESPIRATORY======================  # Acute Hypoxemic Respiratory Failure  - s/p x2 intubations for cardiogenic pulm edema and the in setting of cardiac arrest, resolved - extubated 11/10  - Currently on room air with spO2 mid 90s  - Continue incentive spirometry and monitoring of sp02    # Asthma  - c/w albuterol, symbicort and spiriva  - On trelegy at home    ====================CARDIOVASCULAR====================  # Vfib arrest iso ischemia  - Lido gtt off 11/13  - PO Amio load - total of 5g per EP complete 11/17, continue PO amio  - Keep K > 4, Mag > 2.2     # Cardiogenic shock requiring IABP (11/1- 11/7, 11/9-)  - Likely 2/2 NSTEMI and ADHF  - 11/1 LHC: pLAD 100 % in-stent restenosis & mRCA, 100 %. PCWP 30. IABP placed.  - 11/1 TTE: LV dilated. EF 32 %. Regional WMAs present, mod (grade 2) LV diastolic dysfunction  - 11/2 TTE: EF 22% and + LV thrombus  - PRN Bumex IVP as needed  - IABP swapped 11/20 to RFA, continue 1:1 support  - Off Milrinone gtt @ 7:30 am 11/11  - Currently on hydralazine 100 TID and ISD 40 TID for AL reduction  - Continue romel 25 daily and coreg 25 BID for GDMT  - Listed OHT status 2 11/22, f/u HF recs    # NSTEMI iso stent re-occlusion of pLAD and 100%  of RCA  - EKG on admission w/LBBB  - DAPT: c/w ASA, Brilinta d/c'd per transplant w/u  - c/w lipitor 80  - cMR deferred given necessity of IABP  - CT sx not recommending CABG, undergoing AT eval    # LV thrombus  - c/w heparin gtt    ===================== RENAL =========================  # Non-oliguric ARIC   - sCr stable, Baseline Cr: 1-1.22  - Renal US - no evidence of renal artery stenosis  - Trend BMP, lytes daily, replace as needed  - Continue Strict I/Os, avoid nephrotoxins    # Mild Hyponatremia/Hyperkalemia  - iso ARIC and or new CKD baseline  - Assess volume status and consider diuresis if hypervolemic   - 1.2L Fluid restriction, gtts in NS where applicable   - Low K diet   - Repeat CMP today    ==================== GASTROINTESTINAL===================  - c/w diet  - Continue to monitor for BM    ===================HEMATOLOGIC/ONC ===================  # VTE prophylaxis   - c/w heparin gtt given LV Thrombus and IABP  - H/H and platelets stable    =======================    ENDOCRINE  =====================  # Type 2 DM  - A1c 8.3, glucose   - Continue lantus, premeal, low ISS    ========================INFECTIOUS DISEASE================  # Enterococcus faecalis bacteremia  - BCx 11/17+ for enterococcus faecalis x2, Staph epi x1 (likely contaminant)- pan sensitive   - Urine cx 11/18 + enterococcus faecalis  - BCx 11/18 NGTD  - IABP site swapped to RFA 11/20  - continue vancomycin 1g q12h (11/18-  - CT A/P negative for infectious pathology    # COVID, resolved  - Off airborne precautions 11/11    # Pre-transplant ID w/u   - Trend ID recs for serologies   - Colonoscopy 11/17 - normal   - Chest CT 11/17 - improved LLL aeration  - Pt. requiring vaccines prior to transplant, would initiate series accordingly    Patient requires continuous monitoring with bedside rhythm monitoring, pulse ox monitoring, and intermittent blood gas analysis. Care plan discussed with ICU care team. Patient remained critical and at risk for life threatening decompensation.  Patient seen, examined and plan discussed with CCU team during rounds.     I have personally provided 30 minutes of critical care time excluding time spent on separate procedures, in addition to initial critical care time provided by the CICU Attending, Dr. Lees.    Rita Henry, [facility] ====================ASSESSMENT ==============  58 male with HTN, CAD (s/p PCI 2008), HFrEF, CVA 2018, and T2DM presenting with chest pressure and unknown tachycardia that was shocked x1, [facility] 11/1 found to have in-stent restenosis of pLAD and  of RCA with elevated RA and PA pressures and severely decreased EF 32%. Admitted to CICU for management of cardiogenic shock and ADHF requiring IABP 11/1 -11/7, with hospital course c/b vfib arrest requiring reinsertion of IABP. Currently undergoing workup for AT evaluation with HF.    Plan:  ====================== NEUROLOGY=====================  # Anxiety  - No Seroquel/antipsychotics since vfib arrest and prolonged QTC  - Psych Eval, recommended SSRI, but pt. refused   - Psych recommending atarax PRN  - Continue to monitor mental status    # PT/Conditioning  - Continue band exercised while on bedrest s/t IABP, IS    ==================== RESPIRATORY======================  # Acute Hypoxemic Respiratory Failure  - s/p x2 intubations for cardiogenic pulm edema and the in setting of cardiac arrest, resolved - extubated 11/10  - Currently on room air with spO2 mid 90s  - Continue incentive spirometry and monitoring of sp02    # Asthma  - c/w albuterol, symbicort and spiriva  - On trelegy at home    ====================CARDIOVASCULAR====================  # Vfib arrest iso ischemia  - Lido gtt off 11/13  - PO Amio load - total of 5g per EP complete 11/17, continue PO amio  - Keep K > 4, Mag > 2.2     # Cardiogenic shock requiring IABP (11/1- 11/7, 11/9-)  - Likely 2/2 NSTEMI and ADHF  - 11/1 LHC: pLAD 100 % in-stent restenosis & mRCA, 100 %. PCWP 30. IABP placed.  - 11/1 TTE: LV dilated. EF 32 %. Regional WMAs present, mod (grade 2) LV diastolic dysfunction  - 11/2 TTE: EF 22% and + LV thrombus  - PRN Bumex IVP as needed  - IABP swapped 11/20 to RFA, continue 1:1 support  - Off Milrinone gtt @ 7:30 am 11/11  - Currently on hydralazine 100 TID and ISD 40 TID for AL reduction  - Continue romel 25 daily and coreg 25 BID for GDMT  - Listed OHT status 2 11/22, f/u HF recs    # NSTEMI iso stent re-occlusion of pLAD and 100%  of RCA  - EKG on admission w/LBBB  - DAPT: c/w ASA, Brilinta d/c'd per transplant w/u  - c/w lipitor 80  - cMR deferred given necessity of IABP  - CT sx not recommending CABG, undergoing AT eval    # LV thrombus  - c/w heparin gtt    ===================== RENAL =========================  # Non-oliguric ARIC   - sCr stable, Baseline Cr: 1-1.22  - Renal US - no evidence of renal artery stenosis  - Trend BMP, lytes daily, replace as needed  - Continue Strict I/Os, avoid nephrotoxins    # Mild Hyponatremia/Hyperkalemia  - iso ARIC and or new CKD baseline  - Assess volume status and consider diuresis if hypervolemic   - 1.2L Fluid restriction, gtts in NS where applicable   - Low K diet   - Repeat CMP today    ==================== GASTROINTESTINAL===================  - c/w diet  - Continue to monitor for BM    ===================HEMATOLOGIC/ONC ===================  # VTE prophylaxis   - c/w heparin gtt given LV Thrombus and IABP  - H/H and platelets stable    =======================    ENDOCRINE  =====================  # Type 2 DM  - A1c 8.3, glucose   - Continue lantus, premeal, low ISS    ========================INFECTIOUS DISEASE================  # Enterococcus faecalis bacteremia  - BCx 11/17+ for enterococcus faecalis x2, Staph epi x1 (likely contaminant)- pan sensitive   - Urine cx 11/18 + enterococcus faecalis  - BCx 11/18 NGTD  - IABP site swapped to RFA 11/20  - continue vancomycin 1g q12h (11/18-  - CT A/P negative for infectious pathology    # COVID, resolved  - Off airborne precautions 11/11    # Pre-transplant ID w/u   - Trend ID recs for serologies   - Colonoscopy 11/17 - normal   - Chest CT 11/17 - improved LLL aeration  - Pt. requiring vaccines prior to transplant, would initiate series accordingly    Patient requires continuous monitoring with bedside rhythm monitoring, pulse ox monitoring, and intermittent blood gas analysis. Care plan discussed with ICU care team. Patient remained critical and at risk for life threatening decompensation.  Patient seen, examined and plan discussed with CCU team during rounds.     I have personally provided 30 minutes of critical care time excluding time spent on separate procedures, in addition to initial critical care time provided by the CICU Attending, Dr. Lees.    Rita Henry, [facility] ====================ASSESSMENT ==============  58 male with HTN, CAD (s/p PCI 2008), HFrEF, CVA 2018, and T2DM presenting with chest pressure and unknown tachycardia that was shocked x1, [facility] 11/1 found to have in-stent restenosis of pLAD and  of RCA with elevated RA and PA pressures and severely decreased EF 32%. Admitted to CICU for management of cardiogenic shock and ADHF requiring IABP 11/1 -11/7, with hospital course c/b vfib arrest requiring reinsertion of IABP. Currently undergoing workup for AT evaluation with HF.    Plan:  ====================== NEUROLOGY=====================  # Anxiety  - No Seroquel/antipsychotics since vfib arrest and prolonged QTC  - Psych Eval, recommended SSRI, but pt. refused   - Psych recommending atarax PRN  - Continue to monitor mental status    # PT/Conditioning  - Continue band exercised while on bedrest s/t IABP, IS    ==================== RESPIRATORY======================  # Acute Hypoxemic Respiratory Failure  - s/p x2 intubations for cardiogenic pulm edema and the in setting of cardiac arrest, resolved - extubated 11/10  - Currently on room air with spO2 mid 90s  - Continue incentive spirometry and monitoring of sp02    # Asthma  - c/w albuterol, symbicort and spiriva  - On trelegy at home    ====================CARDIOVASCULAR====================  # Vfib arrest iso ischemia  - Lido gtt off 11/13  - PO Amio load - total of 5g per EP complete 11/17, continue PO amio  - Keep K > 4, Mag > 2.2     # Cardiogenic shock requiring IABP (11/1- 11/7, 11/9-)  - Likely 2/2 NSTEMI and ADHF  - 11/1 LHC: pLAD 100 % in-stent restenosis & mRCA, 100 %. PCWP 30. IABP placed.  - 11/1 TTE: LV dilated. EF 32 %. Regional WMAs present, mod (grade 2) LV diastolic dysfunction  - 11/2 TTE: EF 22% and + LV thrombus  - PRN Bumex IVP as needed  - IABP swapped 11/20 to RFA, continue 1:1 support  - Off Milrinone gtt @ 7:30 am 11/11  - Currently on hydralazine 100 TID and ISD 40 TID for AL reduction  - Continue romel 25 daily and coreg 25 BID for GDMT  - Listed OHT status 2 11/22, f/u HF recs    # NSTEMI iso stent re-occlusion of pLAD and 100%  of RCA  - EKG on admission w/LBBB  - DAPT: c/w ASA, Brilinta d/c'd per transplant w/u  - c/w lipitor 80  - cMR deferred given necessity of IABP  - CT sx not recommending CABG, undergoing AT eval    # LV thrombus  - c/w heparin gtt    ===================== RENAL =========================  # Non-oliguric ARIC   - sCr stable, Baseline Cr: 1-1.22  - Renal US - no evidence of renal artery stenosis  - Trend BMP, lytes daily, replace as needed  - Continue Strict I/Os, avoid nephrotoxins    # Mild Hyponatremia/Hyperkalemia  - iso ARIC and or new CKD baseline  - Assess volume status and consider diuresis if hypervolemic   - 1.2L Fluid restriction, gtts in NS where applicable   - Low K diet   - Repeat CMP today    ==================== GASTROINTESTINAL===================  - c/w diet  - continue to monitor for BM  - c/w bowel regimen    ===================HEMATOLOGIC/ONC ===================  # VTE prophylaxis   - c/w heparin gtt given LV Thrombus and IABP  - H/H and platelets stable    =======================    ENDOCRINE  =====================  # Type 2 DM  - A1c 8.3, glucose   - Continue lantus, premeal, low ISS    ========================INFECTIOUS DISEASE================  # Enterococcus faecalis bacteremia  - BCx 11/17+ for enterococcus faecalis x2, Staph epi x1 (likely contaminant)- pan sensitive   - Urine cx 11/18 + enterococcus faecalis  - BCx 11/18 NGTD  - IABP site swapped to RFA 11/20  - continue vancomycin 1g q12h (11/18-  - CT A/P negative for infectious pathology    # COVID, resolved  - Off airborne precautions 11/11    # Pre-transplant ID w/u   - Trend ID recs for serologies   - Colonoscopy 11/17 - normal   - Chest CT 11/17 - improved LLL aeration  - Pt. requiring vaccines prior to transplant, would initiate series accordingly    Patient requires continuous monitoring with bedside rhythm monitoring, pulse ox monitoring, and intermittent blood gas analysis. Care plan discussed with ICU care team. Patient remained critical and at risk for life threatening decompensation.  Patient seen, examined and plan discussed with CCU team during rounds.     I have personally provided 30 minutes of critical care time excluding time spent on separate procedures, in addition to initial critical care time provided by the CICU Attending, Dr. Lees.    Rita Henry, [facility]

## 2023-11-24 NOTE — PROGRESS NOTE ADULT - ASSESSMENT
59 yo M with PMHx of HTN, CAD w/ 1 stent in 2009, ICH (2008) presented on 11/1 with abn ekg. Patient presented to [facility] where he was found to have STEMI, recommended to get cath however patient did not want to get it there so he left and came here.   EKG here with ST depression in lateral leads and elevation in anterior leads   Prior to [facility] found to be tachycardic, dyspneic, intubated   [facility] 11/1 with chronic total occlusion of LAD and RCA, with elevated RA and PA pressures  TTE 11/1 with severely decreased EF 32%, s/p IABP 11/1    RVP (11/1) Positive for COVID19  RVP (11/10) Negative  BCx (11/2, 11/4) NGTD  ETT Culture (11/2) NGTD  MRSA/MSSA PCR (11/2, 11/10) Negative  UA (11/10) 1 WBC    CXR (11/10) Clear Lungs  TTE (11/2) Left ventricular thrombus.    #Enterococcus Bacteremia, Leukocytosis, Pre-Heart Transplant Evaluation Serologies  Concern for either central line or abdominal source (urine also positive)  CT A/P Noncontrast (11/18) No acute process  Given mixed cultures with Staph epi and Enterococcus faecalis would continue Vancomycin   Patient's penicillin allergy is questionable and patient does not recall reaction.   Anticipate we can utilize Ampicillin in the future with close monitoring  -- removal/exchange of Balloon Pump done on 11/21  --Continue Vancomycin 1.5g IV Q24H.  Target trough 15-20  most recent specimen sent was not a true trough would repeat just prior to the next dose- Anticipate 10 days of antibiotics to be completed 11/27 (balloon pump being exchanged, Abdominal source  would be covered)  --Continue to monitor renal function and vancomycin levels to avoid nephrotoxicity / ototoxicity secondary to vancomycin level 11/21 therapeutic range  --Continue to follow CBC with diff  --Continue to follow temperature curve  --Follow up on susceptibility of Enterococcus faecalis shows Vanco and Ampi sensitive  --Follow up on repeat blood cultures from 11/18 are negative  -- TTE to look for vegetations unable to r/o endocarditis but no vegetations seen    Given evidence of clearing of bacteremia could move forward from an ID perspective to pursue advanced cardiac therapies- listed for transplant status 2E      #Encounter to Vaccinate Patient  COVID19: COVID19 Infection This Admission. Would consider Vaccination in ~3 months  Influenza: declined  Pneumococcal: Would benefit from PCV20  HAV: recommend Havrix  HBV: recommend Heplisav  MMR: Not Mumps Immune, if >1 month until transplant would consider MMR dose  Varicella: Immune  Shingles: recommend Shingrix series  Tdap: recommend Tdap          Thank you for involving us in the care of this patient  Transplant ID will continue to follow  Please call or page with additional questions  Pager; #[phone]  Teams: from 8 am to 5 pm  Brandi Silva MD   57 yo M with PMHx of HTN, CAD w/ 1 stent in 2009, ICH (2008) presented on 11/1 with abn ekg. Patient presented to [facility] where he was found to have STEMI, recommended to get cath however patient did not want to get it there so he left and came here.   EKG here with ST depression in lateral leads and elevation in anterior leads   Prior to [facility] [place] found to be tachycardic, dyspneic, intubated   [facility] 11/1 with chronic total occlusion of LAD and RCA, with elevated RA and PA pressures  TTE 11/1 with severely decreased EF 32%, s/p IABP 11/1    RVP (11/1) Positive for COVID19  RVP (11/10) Negative  BCx (11/2, 11/4) NGTD  ETT Culture (11/2) NGTD  MRSA/MSSA PCR (11/2, 11/10) Negative  UA (11/10) 1 WBC    CXR (11/10) Clear Lungs  TTE (11/2) Left ventricular thrombus.    #Enterococcus Bacteremia, Leukocytosis, Pre-Heart Transplant Evaluation Serologies  Concern for either central line or abdominal source (urine also positive)  CT A/P Noncontrast (11/18) No acute process  Given mixed cultures with Staph epi and Enterococcus faecalis would continue Vancomycin   Patient's penicillin allergy is questionable and patient does not recall reaction.   Anticipate we can utilize Ampicillin in the future with close monitoring or arrange skin challenge for definitive clearance   -- removal/exchange of Balloon Pump done on 11/21  --Continue Vancomycin 1.5g IV Q24H.  Target trough 15-20  most recent specimen sent was not a true trough would repeat just prior to the next dose- Anticipate 10 days of antibiotics to be completed 11/27 (balloon pump being exchanged, Abdominal source  would be covered)  --Continue to monitor renal function and vancomycin levels to avoid nephrotoxicity / ototoxicity secondary to vancomycin level 11/21 therapeutic range  --Continue to follow CBC with diff  --Continue to follow temperature curve  --Follow up on susceptibility of Enterococcus faecalis shows Vanco and Ampi sensitive  --Follow up on repeat blood cultures from 11/18 are negative  -- TTE to look for vegetations unable to r/o endocarditis but no vegetations seen    Given evidence of clearing of bacteremia could move forward from an ID perspective to pursue advanced cardiac therapies- listed for transplant status 2E      #Encounter to Vaccinate Patient  COVID19: COVID19 Infection This Admission. Would consider Vaccination in ~3 months  Influenza: declined  Pneumococcal: Would benefit from PCV20  HAV: recommend Havrix  HBV: recommend Heplisav  MMR: Not Mumps Immune, if >1 month until transplant would consider MMR dose  Varicella: Immune  Shingles: recommend Shingrix series  Tdap: recommend Tdap          Thank you for involving us in the care of this patient  Transplant ID will continue to follow  Please call or page with additional questions  Pager; #[phone]  Teams: from 8 am to 5 pm  Brandi Silva MD   57 yo M with PMHx of HTN, CAD w/ 1 stent in 2009, ICH (2008) presented on 11/1 with abn ekg. Patient presented to [facility] where he was found to have STEMI, recommended to get cath however patient did not want to get it there so he left and came here.   EKG here with ST depression in lateral leads and elevation in anterior leads   Prior to [facility] found to be tachycardic, dyspneic, intubated   [facility] 11/1 with chronic total occlusion of LAD and RCA, with elevated RA and PA pressures  TTE 11/1 with severely decreased EF 32%, s/p IABP 11/1    RVP (11/1) Positive for COVID19  RVP (11/10) Negative  BCx (11/2, 11/4) NGTD  ETT Culture (11/2) NGTD  MRSA/MSSA PCR (11/2, 11/10) Negative  UA (11/10) 1 WBC    CXR (11/10) Clear Lungs  TTE (11/2) Left ventricular thrombus.    #Enterococcus Bacteremia, Leukocytosis, Pre-Heart Transplant Evaluation Serologies  Concern for either central line or abdominal source (urine also positive)  CT A/P Noncontrast (11/18) No acute process  Given mixed cultures with Staph epi and Enterococcus faecalis would continue Vancomycin   Patient's penicillin allergy is questionable and patient does not recall reaction.   Anticipate we can utilize Ampicillin in the future with close monitoring or arrange skin challenge for definitive clearance   -- removal/exchange of Balloon Pump done on 11/21  --Continue Vancomycin 1.5g IV Q24H.  Target trough 15-20  most recent specimen sent was not a true trough would repeat just prior to the next dose- Anticipate 10 days of antibiotics to be completed 11/27 (balloon pump being exchanged, would be sufficient post-abdominal translocation)  --Continue to monitor renal function and vancomycin levels to avoid nephrotoxicity / ototoxicity secondary to vancomycin level 11/21 therapeutic range  --Continue to follow CBC with diff  --Continue to follow temperature curve  --Follow up on susceptibility of Enterococcus faecalis shows Vanco and Ampi sensitive  --Follow up on repeat blood cultures from 11/18 are negative  -- TTE to look for vegetations unable to r/o endocarditis but no vegetations seen    Given evidence of clearing of bacteremia could move forward from an ID perspective to pursue advanced cardiac therapies- listed for transplant status 2E      #Encounter to Vaccinate Patient  COVID19: COVID19 Infection This Admission. Would consider Vaccination in ~3 months  Influenza: declined  Pneumococcal: Would benefit from PCV20  HAV: recommend Havrix  HBV: recommend Heplisav  MMR: Not Mumps Immune, if >1 month until transplant would consider MMR dose  Varicella: Immune  Shingles: recommend Shingrix series  Tdap: recommend Tdap          Thank you for involving us in the care of this patient  Transplant ID will continue to follow  Please call or page with additional questions  Pager; #[phone]  Teams: from 8 am to 5 pm  Brandi Silva MD

## 2023-11-24 NOTE — PROGRESS NOTE ADULT - SUBJECTIVE AND OBJECTIVE BOX
ADVANCED HEART FAILURE & TRANSPLANT  - PROGRESS NOTE  *To reach the NS2 Team from 8am to 5pm (MON-FRI), please call [phone].   _______________________________________________________________________________________________________    Subjective:  - NAEO  - Currently on IABP 1:1; Denies SOB      Medications:  aMIOdarone Tablet   Oral   aMIOdarone Tablet 200 milliGRAM(s) Oral daily  aspirin  chewable 81 milliGRAM(s) Oral daily  atorvastatin 80 milliGRAM(s) Oral at bedtime  budesonide  80 MICROgram(s)/formoterol 4.5 MICROgram(s) Inhaler 2 Puff(s) Inhalation two times a day  carvedilol 25 milliGRAM(s) Oral every 12 hours  chlorhexidine 2% Cloths 1 Application(s) Topical daily  dextrose 50% Injectable 12.5 Gram(s) IV Push once  dextrose 50% Injectable 25 Gram(s) IV Push once  heparin  Infusion 1600 Unit(s)/Hr IV Continuous <Continuous>  hepatitis A (virus) Vaccine - Adult (HAVRIX) 1 milliLiter(s) IntraMuscular once  hydrALAZINE 100 milliGRAM(s) Oral three times a day  hydrOXYzine hydrochloride 25 milliGRAM(s) Oral two times a day PRN  insulin glargine Injectable (LANTUS) 12 Unit(s) SubCutaneous at bedtime  insulin lispro (ADMELOG) corrective regimen sliding scale   SubCutaneous three times a day before meals  insulin lispro (ADMELOG) corrective regimen sliding scale   SubCutaneous at bedtime  insulin lispro Injectable (ADMELOG) 8 Unit(s) SubCutaneous three times a day before meals  isosorbide   dinitrate Tablet (ISORDIL) 40 milliGRAM(s) Oral three times a day  polyethylene glycol 3350 17 Gram(s) Oral two times a day  senna 2 Tablet(s) Oral at bedtime      Physical Exam:    Vitals:  Vital Signs Last 24 Hours  T(C): 36.7 (11-24-23 @ 12:00), Max: 37.1 (11-23-23 @ 20:00)  HR: 79 (11-24-23 @ 12:00) (68 - 81)  BP: 105/69 (11-24-23 @ 06:00) (105/69 - 105/69)  RR: 29 (11-24-23 @ 12:00) (13 - 29)  SpO2: 97% (11-24-23 @ 12:00) (95% - 99%)    I&O's Summary    23 Nov 2023 07:01  -  24 Nov 2023 07:00  --------------------------------------------------------  IN: 1526 mL / OUT: 2550 mL / NET: -1024 mL    24 Nov 2023 07:01  -  24 Nov 2023 12:38  --------------------------------------------------------  IN: 514 mL / OUT: 900 mL / NET: -386 mL    Tele: SR 70's    General: No distress. Comfortable.  HEENT: EOM intact.  Neck: Neck supple. JVP not elevated. No masses  Chest: Clear to auscultation bilaterally  CV: Normal S1 and S2. No murmurs, rub, or gallops. Radial pulses normal, warm peripherally  Abdomen: Soft, non-distended, non-tender  Skin: No rashes or skin breakdown  Extremities: No LE edema  Neurology: Alert and oriented times three. Sensation intact  Psych: Affect normal    Labs:                        12.0   11.15 )-----------( 253      ( 24 Nov 2023 06:15 )             35.8     11-24    127<L>  |  94<L>  |  41<H>  ----------------------------<  189<H>  4.7   |  20<L>  |  1.52<H>    Ca    10.0      24 Nov 2023 06:15  Phos  3.6     11-24  Mg     2.0     11-24    TPro  7.7  /  Alb  4.0  /  TBili  0.4  /  DBili  x   /  AST  21  /  ALT  41  /  AlkPhos  82  11-24    PT/INR - ( 24 Nov 2023 06:15 )   PT: 12.9 sec;   INR: 1.18 ratio    PTT - ( 24 Nov 2023 06:15 )  PTT:82.4 sec

## 2023-11-24 NOTE — PROGRESS NOTE ADULT - ASSESSMENT
59 yo M with HFrEF (LVIDd 6.4 cm, LVEF 32%), CAD s/p PCI (2008), HTN, DMT2 (A1c 8.3%) and CVA s/p TPA (2018), recently treated for PNA who initially presented to [facility] via EMS after syncope reportedly requiring defibrilation. Treated for ACS but left AMA to come to [facility]. On arrival ECG with ST depression in lateral leads. Intubated 11/1 for respiratory failure. Found to have COVID. L/RHC 11/1revealing  of LAD and RCA, elevated filling pressures and CI of 1.5 prompting placement of IABP. Extubated 11/3. IABP was weaned to off 11/7, then the following day on 11/8, developed PMVT cardiac arrest with prompt CPR and defibrillation, started on IV Lidocaine and Amio. IABP ultimately replaced on 11/9 for worsening hypotension. TTE 11/11 revealing LV thrombus.     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 AT evaluation launched 11/10, he is ABO A. Currently well supported on IABP at 1:1 without further arrhythmias.  He was discussed during TSC on 11/16 and was approved for listing, however was found to be Bacteremic with 11/17 Blc Cx growing mixed cultures Staph epi and Enterococcus faecalis and started on IV Vanco. Repeat cultures from 11/18 reveal NGTD and therefore was cleared by ID for listing. He's currently afebrile with downtrended, now plateaued mild leukocytosis on vanc. He is hemodynamically stable with MAPS 70-80s on IABP 1:1 on high dose oral vaosdilators/GDMT. His Cr is stable. He appears euvolemic on exam with net negative fluid balance over the past 24 hours off diuretics, however Na has been drifting downward. . He was listed on 11/22 UNOS Status 2, he is ABO, PRA's 0. Awaiting suitable donor.       Cardiac Studies  11/21/23 TTE: limited unable to rule out endocarditis, technically difficult study  11/1123 TTE: LVEF 22% (global), LV thrombus, normal RV size and function, mild MR, trace TR  11/1/23  LHC showed pLAD 70 % stenosis in the ostium portion of the segment and 100 % in-stent restenosis and in mRCA, 100 % stenosis.   11/1/23 RHC; RA 23 Vw 24, PA 52/33/40, PCWP 30 Vw 33, AO 85/66/73, PA sat 54.4%, CO/CI (F) 2.96/1.44, IABP was placed during the cath through R common femoral artery.   11/1/23 TTE: LVIDd 6.4cm , LVEF 32%, regional WMA, grade II DD,  TAPSE 1.7cm, mld MR, trace TR,     Bedside hemodynamics  11/3: IABP 1:1 RA 5; PA 27/12/18; CO/CI 5.6/2.6; MAP 90-100s.  11/4/23 IABP 1:3 CVP 4 PA 37/18 SvO2 83.8 CO/CI 11.2 CI 5.1  (in the setting of fever to 38.3C)  11/5 IABP 1:1 CVP 3 PA 48/27 SvO2 78.4% CO 8.2 CI 3.7   11/1 (IABP 1:1): CVP 1, PA 33/5/16, MvO2 74.3%

## 2023-11-24 NOTE — PROGRESS NOTE ADULT - PROBLEM SELECTOR PLAN 1
ARIC in the setting of heart failure, COVID infection. At the time of presentation Scr is 1.25. Started to worsen on 11/4 and peaked at 4.07 11/10 and gradually improved to 1.8 on 11/18. Pt had LHC on 11/1. He has hx of DM with proteinuria of 684 but most recent UA negative. Primary team is planning to get cardiac transplant eval. Currently nonoliguric, UOP of 1.9L . BETZAIDA, HIV, Hep B, Hep C - negative. A1c - 8.3. Complements are not low. BETZAIDA and ANCA negative. Light chains - not significant. UPCR <0.1, PLA2R ab - negative, Anti GBM abs - negative. UPEP negative.   Duplex - normal, symmetric blood flow throughout both kidneys. Velocities and RIs are limited by IABP.       PLAN:  No acute intervention from nephrology point of view is indicated. Scr stable at 1.68---> 1.52 today.   Pending serological workup - Anti Ds- DNA abs pending.  Avoid nephrotoxic agents. Dose meds per eGFR.

## 2023-11-24 NOTE — PROGRESS NOTE ADULT - SUBJECTIVE AND OBJECTIVE BOX
PATIENT:  DEVORAH VALENCIA  [id number]    CHIEF COMPLAINT:  Patient is a 58y old male who presents with a chief complaint of CP (23 Nov 2023 20:08)    INTERVAL HISTORY/OVERNIGHT EVENTS:      REVIEW OF SYSTEMS:    Constitutional:     [ ] negative [ ] fevers [ ] chills [ ] weight loss [ ] weight gain  HEENT:                  [ ] negative [ ] dry eyes [ ] eye irritation [ ] postnasal drip [ ] nasal congestion  CV:                         [ ] negative  [ ] chest pain [ ] orthopnea [ ] palpitations [ ] murmur  Resp:                     [ ] negative [ ] cough [ ] shortness of breath [ ] dyspnea [ ] wheezing [ ] sputum [ ] hemoptysis  GI:                          [ ] negative [ ] nausea [ ] vomiting [ ] diarrhea [ ] constipation [ ] abd pain [ ] dysphagia   :                        [ ] negative [ ] dysuria [ ] nocturia [ ] hematuria [ ] increased urinary frequency  Musculoskeletal: [ ] negative [ ] back pain [ ] myalgias [ ] arthralgias [ ] fracture  Skin:                       [ ] negative [ ] rash [ ] itch  Neurological:        [ ] negative [ ] headache [ ] dizziness [ ] syncope [ ] weakness [ ] numbness  Psychiatric:           [ ] negative [ ] anxiety [ ] depression    [ ] All other systems negative    MEDICATIONS:  MEDICATIONS  (STANDING):  aMIOdarone    Tablet 200 milliGRAM(s) Oral daily  aMIOdarone    Tablet   Oral   aspirin  chewable 81 milliGRAM(s) Oral daily  atorvastatin 80 milliGRAM(s) Oral at bedtime  budesonide  80 MICROgram(s)/formoterol 4.5 MICROgram(s) Inhaler 2 Puff(s) Inhalation two times a day  carvedilol 25 milliGRAM(s) Oral every 12 hours  chlorhexidine 2% Cloths 1 Application(s) Topical daily  dextrose 50% Injectable 25 Gram(s) IV Push once  dextrose 50% Injectable 12.5 Gram(s) IV Push once  heparin  Infusion 1600 Unit(s)/Hr (14 mL/Hr) IV Continuous <Continuous>  hepatitis A (virus) Vaccine - Adult (HAVRIX) 1 milliLiter(s) IntraMuscular once  hydrALAZINE 100 milliGRAM(s) Oral three times a day  insulin glargine Injectable (LANTUS) 12 Unit(s) SubCutaneous at bedtime  insulin lispro (ADMELOG) corrective regimen sliding scale   SubCutaneous at bedtime  insulin lispro (ADMELOG) corrective regimen sliding scale   SubCutaneous three times a day before meals  insulin lispro Injectable (ADMELOG) 8 Unit(s) SubCutaneous three times a day before meals  isosorbide   dinitrate Tablet (ISORDIL) 40 milliGRAM(s) Oral three times a day  polyethylene glycol 3350 17 Gram(s) Oral two times a day  senna 2 Tablet(s) Oral at bedtime  vancomycin  IVPB 1000 milliGRAM(s) IV Intermittent every 12 hours    MEDICATIONS  (PRN):  hydrOXYzine hydrochloride 25 milliGRAM(s) Oral two times a day PRN Anxiety    ALLERGIES:  penicillins (Unknown)    Intolerances    OBJECTIVE:  ICU Vital Signs Last 24 Hrs  T(C): 37.1 (24 Nov 2023 06:00), Max: 37.1 (23 Nov 2023 20:00)  T(F): 98.7 (24 Nov 2023 06:00), Max: 98.8 (23 Nov 2023 20:00)  HR: 72 (24 Nov 2023 06:00) (70 - 81)  BP: 105/69 (24 Nov 2023 06:00) (102/60 - 105/69)  BP(mean): 82 (24 Nov 2023 06:00) (73 - 82)  RR: 16 (24 Nov 2023 06:00) (15 - 28)  SpO2: 98% (24 Nov 2023 06:00) (96% - 99%)    O2 Parameters below as of 24 Nov 2023 04:00  Patient On (Oxygen Delivery Method): room air    CAPILLARY BLOOD GLUCOSE  POCT Blood Glucose.: 229 mg/dL (23 Nov 2023 22:57)  POCT Blood Glucose.: 224 mg/dL (23 Nov 2023 18:00)  POCT Blood Glucose.: 156 mg/dL (23 Nov 2023 12:12)  POCT Blood Glucose.: 144 mg/dL (23 Nov 2023 08:45)    I&O's Summary    22 Nov 2023 07:01  -  23 Nov 2023 07:00  --------------------------------------------------------  IN: 1356.5 mL / OUT: 2225 mL / NET: -868.5 mL    23 Nov 2023 07:01  -  24 Nov 2023 06:33  --------------------------------------------------------  IN: 1526 mL / OUT: 2550 mL / NET: -1024 mL    PHYSICAL EXAMINATION:  General: WN/WD NAD  HEENT: PERRLA, EOMI, moist mucous membranes  Neurology: A&Ox3, nonfocal, MOISE x 4  Respiratory: CTA B/L, normal respiratory effort, no wheezes, crackles, rales  CV: RRR, S1S2, no murmurs, rubs or gallops  Abdominal: Soft, NT, ND +BS, Last BM  Extremities: No edema, + peripheral pulses  Lines:    LABS:                          12.4   11.34 )-----------( 289      ( 23 Nov 2023 04:45 )             37.6     11-23    128<L>  |  95<L>  |  39<H>  ----------------------------<  162<H>  5.4<H>   |  19<L>  |  1.68<H>    Ca    10.2      23 Nov 2023 04:44  Phos  4.0     11-23  Mg     2.2     11-23    TPro  8.3  /  Alb  3.9  /  TBili  0.5  /  DBili  x   /  AST  23  /  ALT  40  /  AlkPhos  84  11-23    LIVER FUNCTIONS - ( 23 Nov 2023 04:44 )  Alb: 3.9 g/dL / Pro: 8.3 g/dL / ALK PHOS: 84 U/L / ALT: 40 U/L / AST: 23 U/L / GGT: x           PT/INR - ( 23 Nov 2023 04:45 )   PT: 13.0 sec;   INR: 1.25 ratio    PTT - ( 23 Nov 2023 18:25 )  PTT:70.3 sec    Urinalysis Basic - ( 23 Nov 2023 04:44 )    Color: x / Appearance: x / SG: x / pH: x  Gluc: 162 mg/dL / Ketone: x  / Bili: x / Urobili: x   Blood: x / Protein: x / Nitrite: x   Leuk Esterase: x / RBC: x / WBC x   Sq Epi: x / Non Sq Epi: x / Bacteria: x PATIENT:  DEVORAH VALENCIA  [id number]    CHIEF COMPLAINT:  Patient is a 58y old male who presents with a chief complaint of CP (23 Nov 2023 20:08)    INTERVAL HISTORY/OVERNIGHT EVENTS:      REVIEW OF SYSTEMS:    Constitutional:     [X] negative [ ] fevers [ ] chills [ ] weight loss [ ] weight gain  HEENT:                  [X] negative [ ] dry eyes [ ] eye irritation [ ] postnasal drip [ ] nasal congestion  CV:                         [X] negative  [ ] chest pain [ ] orthopnea [ ] palpitations [ ] murmur  Resp:                     [X] negative [ ] cough [ ] shortness of breath [ ] dyspnea [ ] wheezing [ ] sputum [ ] hemoptysis  GI:                          [X] negative [ ] nausea [ ] vomiting [ ] diarrhea [ ] constipation [ ] abd pain [ ] dysphagia   :                        [X] negative [ ] dysuria [ ] nocturia [ ] hematuria [ ] increased urinary frequency  Musculoskeletal: [X] negative [ ] back pain [ ] myalgias [ ] arthralgias [ ] fracture  Skin:                       [X] negative [ ] rash [ ] itch  Neurological:        [X] negative [ ] headache [ ] dizziness [ ] syncope [ ] weakness [ ] numbness  Psychiatric:           [X] negative [ ] anxiety [ ] depression    MEDICATIONS:  MEDICATIONS  (STANDING):  aMIOdarone    Tablet 200 milliGRAM(s) Oral daily  aMIOdarone    Tablet   Oral   aspirin  chewable 81 milliGRAM(s) Oral daily  atorvastatin 80 milliGRAM(s) Oral at bedtime  budesonide  80 MICROgram(s)/formoterol 4.5 MICROgram(s) Inhaler 2 Puff(s) Inhalation two times a day  carvedilol 25 milliGRAM(s) Oral every 12 hours  chlorhexidine 2% Cloths 1 Application(s) Topical daily  dextrose 50% Injectable 25 Gram(s) IV Push once  dextrose 50% Injectable 12.5 Gram(s) IV Push once  heparin  Infusion 1600 Unit(s)/Hr (14 mL/Hr) IV Continuous <Continuous>  hepatitis A (virus) Vaccine - Adult (HAVRIX) 1 milliLiter(s) IntraMuscular once  hydrALAZINE 100 milliGRAM(s) Oral three times a day  insulin glargine Injectable (LANTUS) 12 Unit(s) SubCutaneous at bedtime  insulin lispro (ADMELOG) corrective regimen sliding scale   SubCutaneous at bedtime  insulin lispro (ADMELOG) corrective regimen sliding scale   SubCutaneous three times a day before meals  insulin lispro Injectable (ADMELOG) 8 Unit(s) SubCutaneous three times a day before meals  isosorbide   dinitrate Tablet (ISORDIL) 40 milliGRAM(s) Oral three times a day  polyethylene glycol 3350 17 Gram(s) Oral two times a day  senna 2 Tablet(s) Oral at bedtime  vancomycin  IVPB 1000 milliGRAM(s) IV Intermittent every 12 hours    MEDICATIONS  (PRN):  hydrOXYzine hydrochloride 25 milliGRAM(s) Oral two times a day PRN Anxiety    ALLERGIES:  penicillins (Unknown)    Intolerances    OBJECTIVE:  ICU Vital Signs Last 24 Hrs  T(C): 37.1 (24 Nov 2023 06:00), Max: 37.1 (23 Nov 2023 20:00)  T(F): 98.7 (24 Nov 2023 06:00), Max: 98.8 (23 Nov 2023 20:00)  HR: 72 (24 Nov 2023 06:00) (70 - 81)  BP: 105/69 (24 Nov 2023 06:00) (102/60 - 105/69)  BP(mean): 82 (24 Nov 2023 06:00) (73 - 82)  RR: 16 (24 Nov 2023 06:00) (15 - 28)  SpO2: 98% (24 Nov 2023 06:00) (96% - 99%)    O2 Parameters below as of 24 Nov 2023 04:00  Patient On (Oxygen Delivery Method): room air    CAPILLARY BLOOD GLUCOSE  POCT Blood Glucose.: 229 mg/dL (23 Nov 2023 22:57)  POCT Blood Glucose.: 224 mg/dL (23 Nov 2023 18:00)  POCT Blood Glucose.: 156 mg/dL (23 Nov 2023 12:12)  POCT Blood Glucose.: 144 mg/dL (23 Nov 2023 08:45)    I&O's Summary    22 Nov 2023 07:01  -  23 Nov 2023 07:00  --------------------------------------------------------  IN: 1356.5 mL / OUT: 2225 mL / NET: -868.5 mL    23 Nov 2023 07:01  -  24 Nov 2023 06:33  --------------------------------------------------------  IN: 1526 mL / OUT: 2550 mL / NET: -1024 mL    PHYSICAL EXAMINATION:  General: WN/WD NAD  HEENT: PERRLA, EOMI, moist mucous membranes  Neurology: A&Ox3, nonfocal, MOISE x 4  Respiratory: CTA B/L, normal respiratory effort, no wheezes, crackles, rales  CV: RRR, S1S2, no murmurs, rubs or gallops  Abdominal: Soft, NT, ND +BS  Extremities: Warm, dry, no edema, + peripheral pulses  Lines: CDI    LABS:                          12.4   11.34 )-----------( 289      ( 23 Nov 2023 04:45 )             37.6     11-23    128<L>  |  95<L>  |  39<H>  ----------------------------<  162<H>  5.4<H>   |  19<L>  |  1.68<H>    Ca    10.2      23 Nov 2023 04:44  Phos  4.0     11-23  Mg     2.2     11-23    TPro  8.3  /  Alb  3.9  /  TBili  0.5  /  DBili  x   /  AST  23  /  ALT  40  /  AlkPhos  84  11-23    LIVER FUNCTIONS - ( 23 Nov 2023 04:44 )  Alb: 3.9 g/dL / Pro: 8.3 g/dL / ALK PHOS: 84 U/L / ALT: 40 U/L / AST: 23 U/L / GGT: x           PT/INR - ( 23 Nov 2023 04:45 )   PT: 13.0 sec;   INR: 1.25 ratio    PTT - ( 23 Nov 2023 18:25 )  PTT:70.3 sec    Urinalysis Basic - ( 23 Nov 2023 04:44 )    Color: x / Appearance: x / SG: x / pH: x  Gluc: 162 mg/dL / Ketone: x  / Bili: x / Urobili: x   Blood: x / Protein: x / Nitrite: x   Leuk Esterase: x / RBC: x / WBC x   Sq Epi: x / Non Sq Epi: x / Bacteria: x

## 2023-11-24 NOTE — PROGRESS NOTE ADULT - PROBLEM SELECTOR PLAN 1
- L IABP at 1:1, placed 11/9. On AC with Heparin infusion (also for LV thrombus); s/p exchange to LFA given high grade bacteremia on 11/20  - Currently on Coreg 25 mg BID hold for MAP <65  - Continue HDZN 100 mg TID and ISDN 40 mg TID, hold for MAP <65  - Continue Spironolactone 25 mg QD  - PRN diuretics to target net even/- 500 fluid balance  - Discussed possible assess filling pressures with swan today or tomorrow; he prefers tomorrow  - AT evaluation: launched 11/10. Accepted for transplant, currently listed UNOS Status 2. ABO A.   -cPRA 0% 11/13  - Last Brilinta dose was on 11/13  - Please keep primary Dr. Banuelos [phone] in the loop per family request. - L IABP at 1:1, placed 11/9. On AC with Heparin infusion (also for LV thrombus); s/p exchange to LFA given high grade bacteremia on 11/20  - Currently on Coreg 25 mg BID hold for MAP <65  - Continue HDZN 100 mg TID and ISDN 40 mg TID, hold for MAP <65  - Continue Spironolactone 25 mg QD  - PRN diuretics to target net even/- 500 fluid balance  - Will repeat labs this afternoon. Discussed possibly assess filling pressures with swan today or tomorrow if labs concerning; he prefers tomorrow  - AT evaluation: launched 11/10. Accepted for transplant, currently listed UNOS Status 2. ABO A.   -cPRA 0% 11/13  - Last Brilinta dose was on 11/13  - Please keep primary Dr. Banuelos [phone] in the loop per family request. - L IABP at 1:1, placed 11/9. On AC with Heparin infusion (also for LV thrombus); s/p exchange to LFA given high grade bacteremia on 11/20  - Currently on Coreg 25 mg BID hold for MAP <65  - Continue HDZN 100 mg TID and ISDN 40 mg TID, hold for MAP <65  - Continue Spironolactone 25 mg QD  - PRN diuretics to target net even/- 500 fluid balance  - AT evaluation: launched 11/10. Accepted for transplant, currently listed UNOS Status 2. ABO A.   -cPRA 0% 11/13  - Last Brilinta dose was on 11/13  - Please keep primary Dr. Banuelos [phone] in the loop per family request.

## 2023-11-24 NOTE — PROGRESS NOTE ADULT - NS ATTEND AMEND GEN_ALL_CORE FT
Patient currently supported with IABP 1:1  Noted to have slowly dropping sodium. Agree with renal team about giving patient hypertonic saline and reassessment. Would not give salt tabs  Agreeable to swan tanna catheter over the weekend for reassessment of his PA pressures  Listed status 2 for OHT

## 2023-11-24 NOTE — PROGRESS NOTE ADULT - CRITICAL CARE ATTENDING COMMENT
History of anxiety disorder and CAD s/p PCI  Admitted with cardiogenic shock and respiratory failure in the setting of stent restenosis  Transferred out and had VT/VF cardiac arrest  Readmitted to CICU  A+Ox3, anxious - continue Atarax prn  Cardiogenic shock requiring IABP, also on Bidil for afterload reduction - will maintain  S/p VT/VF arrest on Amiodarone PO and Coreg  SpO2 mid 90s on room air  Soft diet; refusing bowel regimen  Likely CKD post arrest, compensated on exam, hyponatremic, not overloaded on exam - continue to hold diuretics  H/H low but acceptable on Heparin drip for LV thrombus  Afebrile, on Vancomycin for enterococcus bacteremia, now cleared and IABP replaced  Sugars controlled on Lantus  IABP 11/20

## 2023-11-24 NOTE — PROGRESS NOTE ADULT - PROBLEM SELECTOR PLAN 2
Pt has hyponatremia that was developed during the hospital course.  He is not symptomatic. Urine Osm 471 and Urine Na 88. Na today is 127. Slowly worsening.     PLAN:  Encouraged patient to reduce his water intake.   give 2% saline 30cc /hr for 6 hours and recheck the Na.   Fluid restriction to less than 1L a day.  Trend and monitor.    Hyperkalemia - Encouraged patient to limit K rich foods (banana, orange, etc). Improved today. K - 4.7    Wait for the Final reccs as per the attending.   If you have any questions, please feel free to contact me  Ramon Isaac  Nephrology Fellow  [phone]; Prefer Teams.  (After 5pm or on weekends please page the on-call fellow).

## 2023-11-24 NOTE — PROGRESS NOTE ADULT - PROBLEM SELECTOR PLAN 5
BCx from 11/17 with GPC in pair/chains in both bottles; Mixed cultures Staph epi and Enterococcus faecalis   Repeat cultures from 11/18; NGTD  Currently on vancomycin. Has remote Hx PCN allergy per ID but unclear as he doesn't recall reaction. Cleared for OHT listing  -s/p IABP exchange from RFA to LFA on 11/20

## 2023-11-25 LAB
ALBUMIN SERPL ELPH-MCNC: 3.6 G/DL — SIGNIFICANT CHANGE UP (ref 3.3–5)
ALBUMIN SERPL ELPH-MCNC: 3.6 G/DL — SIGNIFICANT CHANGE UP (ref 3.3–5)
ALP SERPL-CCNC: 77 U/L — SIGNIFICANT CHANGE UP (ref 40–120)
ALP SERPL-CCNC: 77 U/L — SIGNIFICANT CHANGE UP (ref 40–120)
ALT FLD-CCNC: 36 U/L — SIGNIFICANT CHANGE UP (ref 10–45)
ALT FLD-CCNC: 36 U/L — SIGNIFICANT CHANGE UP (ref 10–45)
ANION GAP SERPL CALC-SCNC: 12 MMOL/L — SIGNIFICANT CHANGE UP (ref 5–17)
ANION GAP SERPL CALC-SCNC: 12 MMOL/L — SIGNIFICANT CHANGE UP (ref 5–17)
APTT BLD: 66.2 SEC — HIGH (ref 24.5–35.6)
APTT BLD: 66.2 SEC — HIGH (ref 24.5–35.6)
APTT BLD: 71.4 SEC — HIGH (ref 24.5–35.6)
APTT BLD: 71.4 SEC — HIGH (ref 24.5–35.6)
APTT BLD: 82.8 SEC — HIGH (ref 24.5–35.6)
APTT BLD: 82.8 SEC — HIGH (ref 24.5–35.6)
AST SERPL-CCNC: 22 U/L — SIGNIFICANT CHANGE UP (ref 10–40)
AST SERPL-CCNC: 22 U/L — SIGNIFICANT CHANGE UP (ref 10–40)
BASE EXCESS BLDMV CALC-SCNC: -4 MMOL/L — LOW (ref -3–3)
BASE EXCESS BLDMV CALC-SCNC: -4 MMOL/L — LOW (ref -3–3)
BASE EXCESS BLDV CALC-SCNC: -3.9 MMOL/L — LOW (ref -2–3)
BASE EXCESS BLDV CALC-SCNC: -3.9 MMOL/L — LOW (ref -2–3)
BASOPHILS # BLD AUTO: 0.06 K/UL — SIGNIFICANT CHANGE UP (ref 0–0.2)
BASOPHILS # BLD AUTO: 0.06 K/UL — SIGNIFICANT CHANGE UP (ref 0–0.2)
BASOPHILS NFR BLD AUTO: 0.6 % — SIGNIFICANT CHANGE UP (ref 0–2)
BASOPHILS NFR BLD AUTO: 0.6 % — SIGNIFICANT CHANGE UP (ref 0–2)
BILIRUB SERPL-MCNC: 0.4 MG/DL — SIGNIFICANT CHANGE UP (ref 0.2–1.2)
BILIRUB SERPL-MCNC: 0.4 MG/DL — SIGNIFICANT CHANGE UP (ref 0.2–1.2)
BUN SERPL-MCNC: 35 MG/DL — HIGH (ref 7–23)
BUN SERPL-MCNC: 35 MG/DL — HIGH (ref 7–23)
CA-I SERPL-SCNC: 1.3 MMOL/L — SIGNIFICANT CHANGE UP (ref 1.15–1.33)
CA-I SERPL-SCNC: 1.3 MMOL/L — SIGNIFICANT CHANGE UP (ref 1.15–1.33)
CALCIUM SERPL-MCNC: 10 MG/DL — SIGNIFICANT CHANGE UP (ref 8.4–10.5)
CALCIUM SERPL-MCNC: 10 MG/DL — SIGNIFICANT CHANGE UP (ref 8.4–10.5)
CHLORIDE BLDV-SCNC: 98 MMOL/L — SIGNIFICANT CHANGE UP (ref 96–108)
CHLORIDE BLDV-SCNC: 98 MMOL/L — SIGNIFICANT CHANGE UP (ref 96–108)
CHLORIDE SERPL-SCNC: 98 MMOL/L — SIGNIFICANT CHANGE UP (ref 96–108)
CHLORIDE SERPL-SCNC: 98 MMOL/L — SIGNIFICANT CHANGE UP (ref 96–108)
CO2 BLDMV-SCNC: 22 MMOL/L — SIGNIFICANT CHANGE UP (ref 21–29)
CO2 BLDMV-SCNC: 22 MMOL/L — SIGNIFICANT CHANGE UP (ref 21–29)
CO2 BLDV-SCNC: 23 MMOL/L — SIGNIFICANT CHANGE UP (ref 22–26)
CO2 BLDV-SCNC: 23 MMOL/L — SIGNIFICANT CHANGE UP (ref 22–26)
CO2 SERPL-SCNC: 18 MMOL/L — LOW (ref 22–31)
CO2 SERPL-SCNC: 18 MMOL/L — LOW (ref 22–31)
CREAT SERPL-MCNC: 1.6 MG/DL — HIGH (ref 0.5–1.3)
CREAT SERPL-MCNC: 1.6 MG/DL — HIGH (ref 0.5–1.3)
EGFR: 49 ML/MIN/1.73M2 — LOW
EGFR: 49 ML/MIN/1.73M2 — LOW
EOSINOPHIL # BLD AUTO: 0.5 K/UL — SIGNIFICANT CHANGE UP (ref 0–0.5)
EOSINOPHIL # BLD AUTO: 0.5 K/UL — SIGNIFICANT CHANGE UP (ref 0–0.5)
EOSINOPHIL NFR BLD AUTO: 5 % — SIGNIFICANT CHANGE UP (ref 0–6)
EOSINOPHIL NFR BLD AUTO: 5 % — SIGNIFICANT CHANGE UP (ref 0–6)
GAS PNL BLDMV: SIGNIFICANT CHANGE UP
GAS PNL BLDMV: SIGNIFICANT CHANGE UP
GAS PNL BLDV: 128 MMOL/L — LOW (ref 136–145)
GAS PNL BLDV: 128 MMOL/L — LOW (ref 136–145)
GAS PNL BLDV: SIGNIFICANT CHANGE UP
GAS PNL BLDV: SIGNIFICANT CHANGE UP
GLUCOSE BLDC GLUCOMTR-MCNC: 128 MG/DL — HIGH (ref 70–99)
GLUCOSE BLDC GLUCOMTR-MCNC: 128 MG/DL — HIGH (ref 70–99)
GLUCOSE BLDC GLUCOMTR-MCNC: 154 MG/DL — HIGH (ref 70–99)
GLUCOSE BLDC GLUCOMTR-MCNC: 154 MG/DL — HIGH (ref 70–99)
GLUCOSE BLDC GLUCOMTR-MCNC: 183 MG/DL — HIGH (ref 70–99)
GLUCOSE BLDC GLUCOMTR-MCNC: 183 MG/DL — HIGH (ref 70–99)
GLUCOSE BLDC GLUCOMTR-MCNC: 203 MG/DL — HIGH (ref 70–99)
GLUCOSE BLDC GLUCOMTR-MCNC: 203 MG/DL — HIGH (ref 70–99)
GLUCOSE BLDV-MCNC: 154 MG/DL — HIGH (ref 70–99)
GLUCOSE BLDV-MCNC: 154 MG/DL — HIGH (ref 70–99)
GLUCOSE SERPL-MCNC: 151 MG/DL — HIGH (ref 70–99)
GLUCOSE SERPL-MCNC: 151 MG/DL — HIGH (ref 70–99)
HCO3 BLDMV-SCNC: 21 MMOL/L — SIGNIFICANT CHANGE UP (ref 20–28)
HCO3 BLDMV-SCNC: 21 MMOL/L — SIGNIFICANT CHANGE UP (ref 20–28)
HCO3 BLDV-SCNC: 22 MMOL/L — SIGNIFICANT CHANGE UP (ref 22–29)
HCO3 BLDV-SCNC: 22 MMOL/L — SIGNIFICANT CHANGE UP (ref 22–29)
HCT VFR BLD CALC: 35.7 % — LOW (ref 39–50)
HCT VFR BLD CALC: 35.7 % — LOW (ref 39–50)
HCT VFR BLDA CALC: 35 % — LOW (ref 39–51)
HCT VFR BLDA CALC: 35 % — LOW (ref 39–51)
HGB BLD CALC-MCNC: 11.7 G/DL — LOW (ref 12.6–17.4)
HGB BLD CALC-MCNC: 11.7 G/DL — LOW (ref 12.6–17.4)
HGB BLD-MCNC: 11.8 G/DL — LOW (ref 13–17)
HGB BLD-MCNC: 11.8 G/DL — LOW (ref 13–17)
HOROWITZ INDEX BLDMV+IHG-RTO: 21 — SIGNIFICANT CHANGE UP
HOROWITZ INDEX BLDMV+IHG-RTO: 21 — SIGNIFICANT CHANGE UP
IMM GRANULOCYTES NFR BLD AUTO: 0.5 % — SIGNIFICANT CHANGE UP (ref 0–0.9)
IMM GRANULOCYTES NFR BLD AUTO: 0.5 % — SIGNIFICANT CHANGE UP (ref 0–0.9)
INR BLD: 1.17 RATIO — SIGNIFICANT CHANGE UP (ref 0.85–1.18)
INR BLD: 1.17 RATIO — SIGNIFICANT CHANGE UP (ref 0.85–1.18)
INR BLD: 1.21 RATIO — HIGH (ref 0.85–1.18)
INR BLD: 1.21 RATIO — HIGH (ref 0.85–1.18)
INR BLD: 1.26 RATIO — HIGH (ref 0.85–1.18)
INR BLD: 1.26 RATIO — HIGH (ref 0.85–1.18)
LACTATE BLDV-MCNC: 1.4 MMOL/L — SIGNIFICANT CHANGE UP (ref 0.5–2)
LACTATE BLDV-MCNC: 1.4 MMOL/L — SIGNIFICANT CHANGE UP (ref 0.5–2)
LYMPHOCYTES # BLD AUTO: 1.55 K/UL — SIGNIFICANT CHANGE UP (ref 1–3.3)
LYMPHOCYTES # BLD AUTO: 1.55 K/UL — SIGNIFICANT CHANGE UP (ref 1–3.3)
LYMPHOCYTES # BLD AUTO: 15.5 % — SIGNIFICANT CHANGE UP (ref 13–44)
LYMPHOCYTES # BLD AUTO: 15.5 % — SIGNIFICANT CHANGE UP (ref 13–44)
MAGNESIUM SERPL-MCNC: 2 MG/DL — SIGNIFICANT CHANGE UP (ref 1.6–2.6)
MAGNESIUM SERPL-MCNC: 2 MG/DL — SIGNIFICANT CHANGE UP (ref 1.6–2.6)
MCHC RBC-ENTMCNC: 29 PG — SIGNIFICANT CHANGE UP (ref 27–34)
MCHC RBC-ENTMCNC: 29 PG — SIGNIFICANT CHANGE UP (ref 27–34)
MCHC RBC-ENTMCNC: 33.1 GM/DL — SIGNIFICANT CHANGE UP (ref 32–36)
MCHC RBC-ENTMCNC: 33.1 GM/DL — SIGNIFICANT CHANGE UP (ref 32–36)
MCV RBC AUTO: 87.7 FL — SIGNIFICANT CHANGE UP (ref 80–100)
MCV RBC AUTO: 87.7 FL — SIGNIFICANT CHANGE UP (ref 80–100)
MONOCYTES # BLD AUTO: 0.58 K/UL — SIGNIFICANT CHANGE UP (ref 0–0.9)
MONOCYTES # BLD AUTO: 0.58 K/UL — SIGNIFICANT CHANGE UP (ref 0–0.9)
MONOCYTES NFR BLD AUTO: 5.8 % — SIGNIFICANT CHANGE UP (ref 2–14)
MONOCYTES NFR BLD AUTO: 5.8 % — SIGNIFICANT CHANGE UP (ref 2–14)
NEUTROPHILS # BLD AUTO: 7.29 K/UL — SIGNIFICANT CHANGE UP (ref 1.8–7.4)
NEUTROPHILS # BLD AUTO: 7.29 K/UL — SIGNIFICANT CHANGE UP (ref 1.8–7.4)
NEUTROPHILS NFR BLD AUTO: 72.6 % — SIGNIFICANT CHANGE UP (ref 43–77)
NEUTROPHILS NFR BLD AUTO: 72.6 % — SIGNIFICANT CHANGE UP (ref 43–77)
NRBC # BLD: 0 /100 WBCS — SIGNIFICANT CHANGE UP (ref 0–0)
NRBC # BLD: 0 /100 WBCS — SIGNIFICANT CHANGE UP (ref 0–0)
O2 CT VFR BLD CALC: 47 MMHG — SIGNIFICANT CHANGE UP (ref 30–65)
O2 CT VFR BLD CALC: 47 MMHG — SIGNIFICANT CHANGE UP (ref 30–65)
PCO2 BLDMV: 37 MMHG — SIGNIFICANT CHANGE UP (ref 30–65)
PCO2 BLDMV: 37 MMHG — SIGNIFICANT CHANGE UP (ref 30–65)
PCO2 BLDV: 40 MMHG — LOW (ref 42–55)
PCO2 BLDV: 40 MMHG — LOW (ref 42–55)
PH BLDMV: 7.36 — SIGNIFICANT CHANGE UP (ref 7.32–7.45)
PH BLDMV: 7.36 — SIGNIFICANT CHANGE UP (ref 7.32–7.45)
PH BLDV: 7.34 — SIGNIFICANT CHANGE UP (ref 7.32–7.43)
PH BLDV: 7.34 — SIGNIFICANT CHANGE UP (ref 7.32–7.43)
PHOSPHATE SERPL-MCNC: 4 MG/DL — SIGNIFICANT CHANGE UP (ref 2.5–4.5)
PHOSPHATE SERPL-MCNC: 4 MG/DL — SIGNIFICANT CHANGE UP (ref 2.5–4.5)
PLATELET # BLD AUTO: 228 K/UL — SIGNIFICANT CHANGE UP (ref 150–400)
PLATELET # BLD AUTO: 228 K/UL — SIGNIFICANT CHANGE UP (ref 150–400)
PO2 BLDV: 45 MMHG — SIGNIFICANT CHANGE UP (ref 25–45)
PO2 BLDV: 45 MMHG — SIGNIFICANT CHANGE UP (ref 25–45)
POTASSIUM BLDV-SCNC: 4.8 MMOL/L — SIGNIFICANT CHANGE UP (ref 3.5–5.1)
POTASSIUM BLDV-SCNC: 4.8 MMOL/L — SIGNIFICANT CHANGE UP (ref 3.5–5.1)
POTASSIUM SERPL-MCNC: 4.7 MMOL/L — SIGNIFICANT CHANGE UP (ref 3.5–5.3)
POTASSIUM SERPL-MCNC: 4.7 MMOL/L — SIGNIFICANT CHANGE UP (ref 3.5–5.3)
POTASSIUM SERPL-SCNC: 4.7 MMOL/L — SIGNIFICANT CHANGE UP (ref 3.5–5.3)
POTASSIUM SERPL-SCNC: 4.7 MMOL/L — SIGNIFICANT CHANGE UP (ref 3.5–5.3)
PROT SERPL-MCNC: 7.3 G/DL — SIGNIFICANT CHANGE UP (ref 6–8.3)
PROT SERPL-MCNC: 7.3 G/DL — SIGNIFICANT CHANGE UP (ref 6–8.3)
PROTHROM AB SERPL-ACNC: 12.8 SEC — SIGNIFICANT CHANGE UP (ref 9.5–13)
PROTHROM AB SERPL-ACNC: 12.8 SEC — SIGNIFICANT CHANGE UP (ref 9.5–13)
PROTHROM AB SERPL-ACNC: 13.1 SEC — HIGH (ref 9.5–13)
PROTHROM AB SERPL-ACNC: 13.1 SEC — HIGH (ref 9.5–13)
PROTHROM AB SERPL-ACNC: 13.2 SEC — HIGH (ref 9.5–13)
PROTHROM AB SERPL-ACNC: 13.2 SEC — HIGH (ref 9.5–13)
RBC # BLD: 4.07 M/UL — LOW (ref 4.2–5.8)
RBC # BLD: 4.07 M/UL — LOW (ref 4.2–5.8)
RBC # FLD: 14.6 % — HIGH (ref 10.3–14.5)
RBC # FLD: 14.6 % — HIGH (ref 10.3–14.5)
SAO2 % BLDMV: 72 — SIGNIFICANT CHANGE UP (ref 60–90)
SAO2 % BLDMV: 72 — SIGNIFICANT CHANGE UP (ref 60–90)
SAO2 % BLDV: 67.7 % — SIGNIFICANT CHANGE UP (ref 67–88)
SAO2 % BLDV: 67.7 % — SIGNIFICANT CHANGE UP (ref 67–88)
SODIUM SERPL-SCNC: 128 MMOL/L — LOW (ref 135–145)
SODIUM SERPL-SCNC: 128 MMOL/L — LOW (ref 135–145)
VANCOMYCIN FLD-MCNC: 12.2 UG/ML — SIGNIFICANT CHANGE UP
VANCOMYCIN FLD-MCNC: 12.2 UG/ML — SIGNIFICANT CHANGE UP
WBC # BLD: 10.03 K/UL — SIGNIFICANT CHANGE UP (ref 3.8–10.5)
WBC # BLD: 10.03 K/UL — SIGNIFICANT CHANGE UP (ref 3.8–10.5)
WBC # FLD AUTO: 10.03 K/UL — SIGNIFICANT CHANGE UP (ref 3.8–10.5)
WBC # FLD AUTO: 10.03 K/UL — SIGNIFICANT CHANGE UP (ref 3.8–10.5)

## 2023-11-25 PROCEDURE — 99292 CRITICAL CARE ADDL 30 MIN: CPT | Mod: 25

## 2023-11-25 PROCEDURE — 71045 X-RAY EXAM CHEST 1 VIEW: CPT | Mod: 26

## 2023-11-25 PROCEDURE — 99291 CRITICAL CARE FIRST HOUR: CPT | Mod: 25

## 2023-11-25 PROCEDURE — 99232 SBSQ HOSP IP/OBS MODERATE 35: CPT | Mod: GC

## 2023-11-25 PROCEDURE — 99291 CRITICAL CARE FIRST HOUR: CPT

## 2023-11-25 PROCEDURE — 36556 INSERT NON-TUNNEL CV CATH: CPT

## 2023-11-25 RX ORDER — HEPATITIS B VIRUS VACCINE,RECB 20 MCG/ML
20 VIAL (ML) INTRAMUSCULAR ONCE
Refills: 0 | Status: COMPLETED | OUTPATIENT
Start: 2023-11-25 | End: 2023-11-26

## 2023-11-25 RX ORDER — LACTULOSE 10 G/15ML
15 SOLUTION ORAL ONCE
Refills: 0 | Status: COMPLETED | OUTPATIENT
Start: 2023-11-25 | End: 2023-11-25

## 2023-11-25 RX ORDER — MIDAZOLAM HYDROCHLORIDE 1 MG/ML
2 INJECTION, SOLUTION INTRAMUSCULAR; INTRAVENOUS ONCE
Refills: 0 | Status: DISCONTINUED | OUTPATIENT
Start: 2023-11-25 | End: 2023-11-25

## 2023-11-25 RX ORDER — UREA 15 G
15 POWDER IN PACKET (EA) ORAL
Refills: 0 | Status: DISCONTINUED | OUTPATIENT
Start: 2023-11-25 | End: 2023-11-25

## 2023-11-25 RX ADMIN — MIDAZOLAM HYDROCHLORIDE 2 MILLIGRAM(S): 1 INJECTION, SOLUTION INTRAMUSCULAR; INTRAVENOUS at 15:17

## 2023-11-25 RX ADMIN — Medication 300 MILLIGRAM(S): at 05:03

## 2023-11-25 RX ADMIN — BUDESONIDE AND FORMOTEROL FUMARATE DIHYDRATE 2 PUFF(S): 160; 4.5 AEROSOL RESPIRATORY (INHALATION) at 08:51

## 2023-11-25 RX ADMIN — ISOSORBIDE DINITRATE 40 MILLIGRAM(S): 5 TABLET ORAL at 05:03

## 2023-11-25 RX ADMIN — Medication 81 MILLIGRAM(S): at 12:11

## 2023-11-25 RX ADMIN — POLYETHYLENE GLYCOL 3350 17 GRAM(S): 17 POWDER, FOR SOLUTION ORAL at 17:09

## 2023-11-25 RX ADMIN — LACTULOSE 15 GRAM(S): 10 SOLUTION ORAL at 21:28

## 2023-11-25 RX ADMIN — ATORVASTATIN CALCIUM 80 MILLIGRAM(S): 80 TABLET, FILM COATED ORAL at 21:28

## 2023-11-25 RX ADMIN — Medication 8 UNIT(S): at 12:12

## 2023-11-25 RX ADMIN — Medication 1: at 12:39

## 2023-11-25 RX ADMIN — ISOSORBIDE DINITRATE 40 MILLIGRAM(S): 5 TABLET ORAL at 17:32

## 2023-11-25 RX ADMIN — INSULIN GLARGINE 12 UNIT(S): 100 INJECTION, SOLUTION SUBCUTANEOUS at 21:29

## 2023-11-25 RX ADMIN — ISOSORBIDE DINITRATE 40 MILLIGRAM(S): 5 TABLET ORAL at 12:10

## 2023-11-25 RX ADMIN — Medication 100 MILLIGRAM(S): at 13:37

## 2023-11-25 RX ADMIN — BUDESONIDE AND FORMOTEROL FUMARATE DIHYDRATE 2 PUFF(S): 160; 4.5 AEROSOL RESPIRATORY (INHALATION) at 21:08

## 2023-11-25 RX ADMIN — Medication 8 UNIT(S): at 08:12

## 2023-11-25 RX ADMIN — CARVEDILOL PHOSPHATE 25 MILLIGRAM(S): 80 CAPSULE, EXTENDED RELEASE ORAL at 17:09

## 2023-11-25 RX ADMIN — AMIODARONE HYDROCHLORIDE 200 MILLIGRAM(S): 400 TABLET ORAL at 05:03

## 2023-11-25 RX ADMIN — CARVEDILOL PHOSPHATE 25 MILLIGRAM(S): 80 CAPSULE, EXTENDED RELEASE ORAL at 06:12

## 2023-11-25 RX ADMIN — SENNA PLUS 2 TABLET(S): 8.6 TABLET ORAL at 21:26

## 2023-11-25 RX ADMIN — Medication 1: at 08:12

## 2023-11-25 RX ADMIN — POLYETHYLENE GLYCOL 3350 17 GRAM(S): 17 POWDER, FOR SOLUTION ORAL at 05:03

## 2023-11-25 RX ADMIN — Medication 8 UNIT(S): at 17:16

## 2023-11-25 RX ADMIN — HEPARIN SODIUM 13 UNIT(S)/HR: 5000 INJECTION INTRAVENOUS; SUBCUTANEOUS at 05:23

## 2023-11-25 RX ADMIN — Medication 25 MILLIGRAM(S): at 21:26

## 2023-11-25 NOTE — PROVIDER CONTACT NOTE (EICU) - ASSESSMENT
Pt educated on the risks of infection and poor hygiene. Offered new set of cloths and chlorhexidene wipes. Would only allow for lower extremities to be cleaned. Pt refused further hygienic management.

## 2023-11-25 NOTE — PROCEDURE NOTE - GENERAL INDICATIONS
[address] [facility] E CARE  [address] [facility]  [address] [place] [id number]  Dept: [phone]  Loc: [phone]    Marino Nunes is a 42 y.o. male who presents today for his medical conditions/complaints as noted below. Marino Nunes is c/o of Medication Refill (wants refil of focalin 10mg)        HPI:     Patient presents with c/o needing medication refill. He appears anxious, hands tremoring, very talkative. Patient was advised to proceed to [address] [place] [address] ER from our triage area. He is agreeable to this and is stable to go to ER. Past Medical History:   Diagnosis Date    Alcohol abuse     Anxiety     Back pain without radiation     HTN (hypertension)       No past surgical history on file.     Family History   Problem Relation Age of Onset    Colon Cancer Mother     Esophageal Cancer Mother     Colon Cancer Maternal Grandmother     Colon Polyps Maternal Grandmother     Liver Cancer Maternal Grandmother     Liver Disease Neg Hx     Rectal Cancer Neg Hx     Stomach Cancer Neg Hx        Social History   Substance Use Topics    Smoking status: Current Every Day Smoker     Packs/day: 1.00     Years: 24.00     Types: Cigarettes    Smokeless tobacco: Never Used    Alcohol use No      Comment: Pt states last drink was 2015      Current Outpatient Prescriptions   Medication Sig Dispense Refill    lisinopril (PRINIVIL;ZESTRIL) 20 MG tablet TAKE 1 TABLET BY MOUTH DAILY 30 tablet 2    diphenhydrAMINE (BENADRYL) 25 MG capsule Take 25 mg by mouth      disulfiram (ANTABUSE) 250 MG tablet Take 250 mg by mouth      omeprazole (PRILOSEC) 40 MG delayed release capsule Take 40 mg by mouth 30 minute prior to breakfast      sucralfate (CARAFATE) 1 GM tablet Take 1 tablet by mouth 4 times daily (before meals and nightly) 120 tablet 3    nitroGLYCERIN (NITROSTAT) 0.3 MG SL tablet Place 1 tablet under the tongue every 5 minutes as needed for Chest pain up to max of 3 hemodynamic monitoring

## 2023-11-25 NOTE — PROGRESS NOTE ADULT - SUBJECTIVE AND OBJECTIVE BOX
Body Location Override (Optional - Billing Will Still Be Based On Selected Body Map Location If Applicable): midline upper “V” of neck Detail Level: Detailed [facility] DIVISION OF KIDNEY DISEASES AND HYPERTENSION   FOLLOW UP NOTE    --------------------------------------------------------------------------------    SUBJECTIVE / ROS / INTERVAL EVENTS:  - Patient seen and examined at bedside in CICU  - IABP in place   - Na 128 from 127. Discussed free water restriction and starting URENA. Pt expressed understanding  - UOP 1.7L/24h, net -700cc      PAST HISTORY  --------------------------------------------------------------------------------  No significant changes to PMH, PSH, FHx, SHx, unless otherwise noted    ALLERGIES & MEDICATIONS  --------------------------------------------------------------------------------  Allergies    penicillins (Unknown)      Standing Inpatient Medications  aMIOdarone    Tablet   Oral   aMIOdarone    Tablet 200 milliGRAM(s) Oral daily  aspirin  chewable 81 milliGRAM(s) Oral daily  atorvastatin 80 milliGRAM(s) Oral at bedtime  budesonide  80 MICROgram(s)/formoterol 4.5 MICROgram(s) Inhaler 2 Puff(s) Inhalation two times a day  carvedilol 25 milliGRAM(s) Oral every 12 hours  chlorhexidine 2% Cloths 1 Application(s) Topical daily  dextrose 50% Injectable 12.5 Gram(s) IV Push once  dextrose 50% Injectable 25 Gram(s) IV Push once  heparin  Infusion 1600 Unit(s)/Hr IV Continuous <Continuous>  hydrALAZINE 100 milliGRAM(s) Oral three times a day  insulin glargine Injectable (LANTUS) 12 Unit(s) SubCutaneous at bedtime  insulin lispro (ADMELOG) corrective regimen sliding scale   SubCutaneous three times a day before meals  insulin lispro (ADMELOG) corrective regimen sliding scale   SubCutaneous at bedtime  insulin lispro Injectable (ADMELOG) 8 Unit(s) SubCutaneous three times a day before meals  isosorbide   dinitrate Tablet (ISORDIL) 40 milliGRAM(s) Oral three times a day  lactulose Syrup 15 Gram(s) Oral once  polyethylene glycol 3350 17 Gram(s) Oral two times a day  senna 2 Tablet(s) Oral at bedtime    PRN Inpatient Medications  hydrOXYzine hydrochloride 25 milliGRAM(s) Oral two times a day PRN      VITALS  --------------------------------------------------------------------------------  T(C): 36.7 (11-25-23 @ 11:00), Max: 36.9 (11-24-23 @ 19:00)  HR: 75 (11-25-23 @ 11:00) (67 - 85)  BP: 104/63 (11-25-23 @ 06:00) (92/54 - 104/63)  RR: 20 (11-25-23 @ 11:00) (16 - 34)  SpO2: 98% (11-25-23 @ 11:00) (96% - 99%)  Wt(kg): --      11-24-23 @ 07:01  -  11-25-23 @ 07:00  --------------------------------------------------------  IN: 1000 mL / OUT: 1700 mL / NET: -700 mL    11-25-23 @ 07:01  -  11-25-23 @ 11:57  --------------------------------------------------------  IN: 172 mL / OUT: 600 mL / NET: -428 mL      PHYSICAL EXAM:  General: no acute distress  Neuro: no focal deficits  HEENT: NC/AT, anicteric  Pulmonary: lungs CTA B/L  Cardiovascular/Chest: +S1S2, RRR  GI/Abdomen: soft, NT/ND, +bowel sounds  Extremities: No edema  Skin: Warm and dry    LABS/STUDIES  --------------------------------------------------------------------------------              11.8   10.03 >-----------<  228      [11-25-23 @ 04:56]              35.7     128  |  98  |  35  ----------------------------<  151      [11-25-23 @ 04:56]  4.7   |  18  |  1.60        Ca     10.0     [11-25-23 @ 04:56]      Mg     2.0     [11-25-23 @ 04:56]      Phos  4.0     [11-25-23 @ 04:56]    TPro  7.3  /  Alb  3.6  /  TBili  0.4  /  DBili  x   /  AST  22  /  ALT  36  /  AlkPhos  77  [11-25-23 @ 04:56]    PT/INR: PT 13.2 , INR 1.21       [11-25-23 @ 10:56]  PTT: 71.4       [11-25-23 @ 10:56]      Creatinine Trend:  SCr 1.60 [11-25 @ 04:56]  SCr 1.52 [11-24 @ 06:15]  SCr 1.68 [11-23 @ 04:44]  SCr 1.52 [11-22 @ 04:16]  SCr 1.61 [11-21 @ 03:50]    Urinalysis - [11-25-23 @ 04:56]      Color  / Appearance  / SG  / pH       Gluc 151 / Ketone   / Bili  / Urobili        Blood  / Protein  / Leuk Est  / Nitrite       RBC  / WBC  / Hyaline  / Gran  / Sq Epi  / Non Sq Epi  / Bacteria     Urine Creatinine 62      [11-21-23 @ 03:50]  Urine Protein <7      [11-21-23 @ 03:50]  Urine Sodium 88      [11-19-23 @ 04:24]  Urine Potassium 22      [11-19-23 @ 04:24]  Urine Phosphorus 39.3      [11-19-23 @ 04:24]  Urine Osmolality 431      [11-19-23 @ 04:24]    HBsAb Nonreact      [11-10-23 @ 17:29]  HBcAb Nonreact      [11-10-23 @ 17:29]  HCV 0.09, Nonreact      [11-10-23 @ 17:29]    BETZAIDA: titer Negative, pattern --      [11-10-23 @ 17:29]  C3 Complement 171      [11-19-23 @ 02:54]  C4 Complement 45      [11-19-23 @ 02:54]  Rheumatoid Factor 13      [11-10-23 @ 17:25]  ANCA: cANCA Negative, pANCA Negative, atypical ANCA Negative      [11-21-23 @ 03:50]  anti-GBM <0.2      [11-19-23 @ 02:54]  Syphilis Screen (Treponema Pallidum Ab) Negative      [11-10-23 @ 17:29]  PLA2R: JACEK <1.8, IFA --      [11-19-23 @ 02:54]  Free Light Chains: kappa 3.76, lambda 3.25, ratio = 1.16      [11-19 @ 02:54]  Immunofixation Serum:   No Monoclonal Band Identified      Reference Range: None Detected      [11-19-23 @ 02:54]  SPEP Interpretation: Normal Electrophoresis Pattern      [11-10-23 @ 17:29]  Immunofixation Urine: No Monoclonal Band Identified      Reference Range: None Detected      [11-12-23 @ 05:03]  UPEP Interpretation: Normal Electrophoresis Pattern      [11-12-23 @ 05:03] Add 08341 Cpt? (Important Note: In 2017 The Use Of 36186 Is Being Tracked By Cms To Determine Future Global Period Reimbursement For Global Periods): yes

## 2023-11-25 NOTE — PROGRESS NOTE ADULT - ATTENDING COMMENTS
59 YO M with ACC/AHA Stage D ICM who presented after recent ACS with cardiogenic shock requiring IABP with course c/b refractory ventricular arrhythmias. Transplant evaluation was launched but he developed bacteremia 11/17 which has since cleared. He was deemed a suitable transplant candidate and listed UNOS status 2. ABO A, PRA 0%.     He appears well supported with IABP and vasodilators. He is tolerating high dose of beta blocker without signs of decreased cardiac output. His eGFR is stable. He remains hyponatremia, nephrology following and favor additional hypertonic saline today. His CXR is clear and PTT at goal on heparin.     Due to previously elevated PVR, RHC has been recommended to re-evaluate pulmonary pressures and PVR. If numbers are acceptable can remove PAC afterwards.

## 2023-11-25 NOTE — PROGRESS NOTE ADULT - ASSESSMENT
59 yo male h/o htn, cad s/p pci, ICH, here with NSTEMI  s/p intubation and cath. now in CCU    NSTEMI  s/p  cath  cath results noted. multi vessel dz., CTSx f/u.   hep gtt  pt with vtach/fib arrest again on 11/8. s/p ACLS and ROSC.   now extubated  IABP in place  mngt as per CCU  plan for transplant vs LVAD as per HF and transplant team  transplant w/u ongoing.   s/p colonoscopy 11/17. normal  now listed for transplant as of 11/22    LV thrombus   hep gtt    acute on chronic systolic heart failure  heart failure team f/u    pna  recently diagnosed outpt  iv abx now stopped  f/u cult   id following     covid  supportive care     h/o ICH  resolved    agitation  psy consult f/u    bacteremia  id following  iv abx  f/u repeat cult - neg    mngt as per CCU team          Advanced care planning was discussed with patient and family.  Advanced care planning forms were reviewed and discussed as appropriate.  Differential diagnosis and plan of care discussed with patient after the evaluation.   Pain assessed and judicious use of narcotics when appropriate was discussed.  Importance of Fall prevention discussed.  Counseling on Smoking and Alcohol cessation was offered when appropriate.  Counseling on Diet, exercise, and medication compliance was done.       Approx 75 minutes spent.

## 2023-11-25 NOTE — PROGRESS NOTE ADULT - CRITICAL CARE ATTENDING COMMENT
History of anxiety disorder and CAD s/p PCI  Admitted with cardiogenic shock and respiratory failure in the setting of stent restenosis  Transferred out and had VT/VF cardiac arrest  Readmitted to CICU  A+Ox3, anxious - continue Atarax prn  Cardiogenic shock requiring IABP, also on Bidil for afterload reduction - will maintain  S/p VT/VF arrest on Amiodarone PO and Coreg  SpO2 mid 90s on room air  Soft diet; refusing bowel regimen  Likely CKD post arrest, compensated on exam, hyponatremic, not overloaded on exam - continue to hold diuretics  H/H low but acceptable on Heparin drip for LV thrombus  Afebrile, on Vancomycin for enterococcus bacteremia, now cleared and IABP replaced  Sugars controlled on Lantus  IABP 11/20.

## 2023-11-25 NOTE — PROGRESS NOTE ADULT - ASSESSMENT
58 yrs old male w/ hx of HFrEF (LVIDd 6.4 cm, LVEF 32%), CAD s/p PCI (2008), HTN, DMT2 (A1c 8.3%) and CVA s/p TPA (2018), recently treated for PNA who initially presented to [facility] via EMS after syncope reportedly requiring defibrilation. Treated for ACS but left AMA to come to [facility]. Pt has covid and was placed on IABP for hypotension. Now being evaluated for Heart transplant Vs LVAD placement. Nephrology consulted for ARIC.      ARIC: in the setting of heart failure, COVID infection. At the time of presentation Scr is 1.25. Started to worsen on 11/4 and peaked at 4.07 11/10 and gradually improved to 1.8 on 11/18. Pt had LHC on 11/1. He has hx of DM with proteinuria of 684 but most recent UA negative. Primary team is planning to get cardiac transplant eval. Remains nonoliguric, UOP 1.7L/24h. HIV, Hep B, Hep C negative. A1c - 8.3. Complements are not low. BETZAIDA and ANCA negative. Light chains - not significant. UPCR <0.1, PLA2R ab - negative, Anti GBM abs - negative. UPEP negative.   Duplex - normal, symmetric blood flow throughout both kidneys. Velocities and RIs are limited by IABP.   Serum creatinine is stable at 1.5-1.7mg/dL. No indication for dialysis.  Avoid nephrotoxic agents. Dose meds per eGFR.    Hyponatremia: developed during this hospital course. He is not symptomatic. Urine Osm 471 and Urine Na 88.   Given 150cc bolus of 3% saline on 11/24. Na today is 128 from 127.  Recommend fluid restriction to <1L/day. Discussed with pt.  Avoiding salt tabs iso heart failure, but recommend Urena 15g BID.   Discussed with CICU team.    Jm Lundberg, DO  Nephrology Fellow  Feel free to contact me directly on TEAMS with any questions.  (After 5pm or on weekends, please call the on-call fellow).

## 2023-11-25 NOTE — PROGRESS NOTE ADULT - SUBJECTIVE AND OBJECTIVE BOX
LD VALENCIA  58y Male  MRN:[id number]    Patient is a 58y old  Male who presents with a chief complaint of NSTEMI (01 Nov 2023 20:29)    HPI:  59yo M w/ hx HTN, CAD w/ 1 stent in 2009, ICH (2008) presenting with abn ekg. Patient presented to [facility] where he was found to have STEMI, recommended to get cath however patient did not want to get it there so it left and came here.  Patient initially had cough, congestion, fever, was placed on antibiotics on Sunday.  Started feeling nauseous and had a presyncopal event after which he presented to ED last night.  Had chest pain as well.  Chest pain is midsternal.  Not currently having chest pain.  Received 4 aspirin 30 min pta. (01 Nov 2023 15:11)      Patient seen and evaluated at bedside in CCU. interval events noted    Interval HPI: remain in ccu. IABP in place   no acute events o/n        PAST MEDICAL & SURGICAL HISTORY:  HTN (hypertension)      CAD (coronary artery disease)  2009; stent      Intracranial hemorrhage  2008      Respiratory arrest  december 1st      Myocardial infarction, unspecified MI type, unspecified artery      History of coronary artery stent placement          REVIEW OF SYSTEMS:  as per hpi     VITALS:   ICU Vital Signs Last 24 Hrs  T(C): 36.7 (25 Nov 2023 19:00), Max: 37 (25 Nov 2023 16:00)  T(F): 98 (25 Nov 2023 19:00), Max: 98.6 (25 Nov 2023 16:00)  HR: 70 (25 Nov 2023 21:32) (67 - 78)  BP: 104/63 (25 Nov 2023 06:00) (92/54 - 104/63)  BP(mean): 79 (25 Nov 2023 06:00) (68 - 79)  ABP: --  ABP(mean): --  RR: 21 (25 Nov 2023 21:00) (16 - 26)  SpO2: 98% (25 Nov 2023 21:32) (96% - 100%)    O2 Parameters below as of 25 Nov 2023 21:32  Patient On (Oxygen Delivery Method): room air             PHYSICAL EXAM:  GENERAL: NAD, comfortable   HEAD:  Atraumatic, Normocephalic  EYES: EOMI, PERRLA, conjunctiva and sclera clear  NECK: Supple, No JVD   CHEST/LUNG: Clear to auscultation bilaterally; No wheeze  HEART: Regular rate and rhythm; No murmurs, rubs, or gallops  ABDOMEN: Soft, Nontender, Nondistended; Bowel sounds present  EXTREMITIES:  2+ Peripheral Pulses, No clubbing, cyanosis, or edema  NEUROLOGY: nonfocal  SKIN: No rashes or lesions  +iabp    Consultant(s) Notes Reviewed:  [x ] YES  [ ] NO  Care Discussed with Consultants/Other Providers [ x] YES  [ ] NO    MEDS:   MEDICATIONS  (STANDING):  aMIOdarone    Tablet 200 milliGRAM(s) Oral daily  aMIOdarone    Tablet   Oral   aspirin  chewable 81 milliGRAM(s) Oral daily  atorvastatin 80 milliGRAM(s) Oral at bedtime  budesonide  80 MICROgram(s)/formoterol 4.5 MICROgram(s) Inhaler 2 Puff(s) Inhalation two times a day  carvedilol 25 milliGRAM(s) Oral every 12 hours  chlorhexidine 2% Cloths 1 Application(s) Topical daily  dextrose 50% Injectable 25 Gram(s) IV Push once  dextrose 50% Injectable 12.5 Gram(s) IV Push once  heparin  Infusion 1600 Unit(s)/Hr (13 mL/Hr) IV Continuous <Continuous>  hepatitis B Vaccine - Adult (HEPLISAV-B) 20 MICROGram(s) IntraMuscular once  hydrALAZINE 100 milliGRAM(s) Oral three times a day  insulin glargine Injectable (LANTUS) 12 Unit(s) SubCutaneous at bedtime  insulin lispro (ADMELOG) corrective regimen sliding scale   SubCutaneous three times a day before meals  insulin lispro (ADMELOG) corrective regimen sliding scale   SubCutaneous at bedtime  insulin lispro Injectable (ADMELOG) 8 Unit(s) SubCutaneous three times a day before meals  isosorbide   dinitrate Tablet (ISORDIL) 40 milliGRAM(s) Oral three times a day  polyethylene glycol 3350 17 Gram(s) Oral two times a day  senna 2 Tablet(s) Oral at bedtime    MEDICATIONS  (PRN):  hydrOXYzine hydrochloride 25 milliGRAM(s) Oral two times a day PRN Anxiety      ALLERGIES:  penicillins (Unknown)      LABS:                                                           11.8   10.03 )-----------( 228      ( 25 Nov 2023 04:56 )             35.7   11-25    128<L>  |  98  |  35<H>  ----------------------------<  151<H>  4.7   |  18<L>  |  1.60<H>    Ca    10.0      25 Nov 2023 04:56  Phos  4.0     11-25  Mg     2.0     11-25    TPro  7.3  /  Alb  3.6  /  TBili  0.4  /  DBili  x   /  AST  22  /  ALT  36  /  AlkPhos  77  11-25      < from: Colonoscopy (11.17.23 @ 10:35) >                                                                                                        Impression:          - The entire examined colon is normal on direct and retroflexion views.                       - No specimens collected.  Recommendation:      - Resume previous diet today.                       - No large polyps or masses detected, No objection from GI standpoint to                 proceed with heart transplantation/advanced heart therapies.                       - Please call back should any further questions or concerns arise.    < end of copied text >     < from: Xray Chest 1 View- PORTABLE-Urgent (11.01.23 @ 07:42) >    IMPRESSION:  Clear lungs.    ---End of Report ---        < end of copied text >  < from: TTE W or WO Ultrasound Enhancing Agent (11.01.23 @ 10:23) >  _____________________________     CONCLUSIONS:      1. Left ventricular cavity is moderately dilated. Left ventricular wall thickness is normal. Left ventricular systolic function is severely decreased with an ejection fraction of 32 % by Chinchilla's method of disks. Regional wall motion abnormalities present.   2. Multiple segmental abnormalities exist. See findings.   3. There is moderate (grade 2) left ventricular diastolic dysfunction, with indeterminant filling pressure.   4. Normal right ventricular cavity size, wall thickness, and systolic function.   5. No significant valvular disease.   6. No pericardial effusion seen.   7. Compared to the transthoracic echocardiogram performed on 1/25/2017 the areas of akinesis are unchanged but there has been a decline in LV systolic function with new areas of hypokinesis.    __________________________________________________________________    < end of copied text >  < from: TTE Limited W or WO Ultrasound Enhancing Agent (11.02.23 @ 07:41) >  __________________________     CONCLUSIONS:      1. After obtaining consent, Definity ultrasound enhancing agent was given for enhanced left ventricular opacification and improved delineation of the left ventricular endocardial borders. Left ventricular systolic function is severely decreased with a calculated ejection fraction of 22 % by the Chinchilla's biplane method of disks. There is a left ventricular thrombus.   2. Findings were discussed with Litzy BOSS on 11/2/2023 at 8.49am.   3. There is a left ventricular thrombus.    _________________________________________________________________    < end of copied text >

## 2023-11-25 NOTE — PROCEDURE NOTE - NSINFORMCONSENT_GEN_A_CORE
This was an emergent procedure.
This was an emergent procedure.
Benefits, risks, and possible complications of procedure explained to patient/caregiver who verbalized understanding and gave verbal consent.
Benefits, risks, and possible complications of procedure explained to patient/caregiver who verbalized understanding and gave verbal consent.
This was an emergent procedure.
Benefits, risks, and possible complications of procedure explained to patient/caregiver who verbalized understanding and gave verbal consent.

## 2023-11-25 NOTE — PROGRESS NOTE ADULT - PROBLEM SELECTOR PLAN 5
BCx from 11/17 with GPC in pair/chains in both bottles; Mixed cultures Staph epi and Enterococcus faecalis   Repeat cultures from 11/18; NGTD  Currently on vancomycin. Has remote Hx PCN allergy per ID but unclear as he doesn't recall reaction. Cleared for OHT listing  -s/p IABP exchange from RFA to LFA on 11/20.

## 2023-11-25 NOTE — PROCEDURE NOTE - ADDITIONAL PROCEDURE DETAILS
Under sterile conditions and with proper draping of the patient, a pulmonary artery catheter was floated through the introducer catheter in the RIJ vein. With the balloon inflated with 1.5ml of air, the PA catheter was advanced while monitoring the pressure tracing from the central venous system to the right ventricle and to the pulmonary artery.  The balloon was deflated, PA pressure tracing was noted again. The position of the catheter was verified by CXR.     Complications: None     I certify that a time out took place prior to prep and drape, verbally confirming correct patient identity, correct site and side.       Rita Hawthorne, [facility] [phone]
PROCEDURE NOTE  REMOVAL OF INTRA AORTIC BALLOON PUMP    The IABP (intra-aortic balloon pump) was weaned according to protocol.  Hemodynamics remained stable.  Pulses in the     Right          lower extremity are palpable.  The patient was placed in the supine position.  The insertion site was identified and the sutures were removed.  The IABP was turned off and the balloon deflated.  The IABP was then removed.  Direct pressure was applied for      36         minutes.  A sandbag was applied and is  to remain in place for three hours.    Monitoring of the      Right       groin and both lower extremities including neuro-vascular checks and vital signs every 15 minutes  x4, then every 30 minutes x 2, then every 1 hr x 4 was ordered.
Mixed 72.0 CO/CI 6.2/2.8 CVP 3  dynes

## 2023-11-25 NOTE — PROGRESS NOTE ADULT - ATTENDING COMMENTS
# ARIC -  Serum creatinine is stable at 1.6-1.7mg/dL. No indication for dialysis.    # Hyponatremia - limit water intake to < 1 Liter. Patient already trying to limit fluid. Start UreNa 15g BID.     # Hyperkalemia - resolved    The rest of the recommendations as per fellow's note.    Suzanne Jackson MD  Attending Nephrologist  [phone] or via [place] .

## 2023-11-25 NOTE — PROGRESS NOTE ADULT - SUBJECTIVE AND OBJECTIVE BOX
CICU DAY NOTE  Admission date: 11/1/23  Chief complaint/ Diagnosis: Cardiogenic shock   HPI: 57yo M w/ hx HTN, CAD w/ 1 stent in 2009, ICH (2008) presenting with abn ekg. Patient presented to [facility] where he was found to have STEMI, recommended to get cath however patient did not want to get it there so it left and came here.  Patient initially had cough, congestion, fever, was placed on antibiotics on Sunday.  Started feeling nauseous and had a presyncopal event after which he presented to ED last night.  Had chest pain as well.  Chest pain is midsternal.  Not currently having chest pain.  Received 4 aspirin 30 min pta. (01 Nov 2023 15:11)    Interval history: Uneventful overnight  Received the patient on Hep gtt  IABP in place   pt is upset that he cannot have "fruits"    REVIEW OF SYSTEMS  Denies CP, Palpitation, SOB, Dyspnea [ x ] All other systems negative    MEDICATIONS  (STANDING):  aMIOdarone    Tablet 200 milliGRAM(s) Oral daily  aspirin  chewable 81 milliGRAM(s) Oral daily  atorvastatin 80 milliGRAM(s) Oral at bedtime  budesonide  80 MICROgram(s)/formoterol 4.5 MICROgram(s) Inhaler 2 Puff(s) Inhalation two times a day  carvedilol 25 milliGRAM(s) Oral every 12 hours  chlorhexidine 2% Cloths 1 Application(s) Topical daily  dextrose 50% Injectable 12.5 Gram(s) IV Push once  dextrose 50% Injectable 25 Gram(s) IV Push once  heparin  Infusion 1600 Unit(s)/Hr (13 mL/Hr) IV Continuous <Continuous>  hydrALAZINE 100 milliGRAM(s) Oral three times a day  insulin glargine Injectable (LANTUS) 12 Unit(s) SubCutaneous at bedtime  insulin lispro (ADMELOG) corrective regimen sliding scale   SubCutaneous three times a day before meals  insulin lispro (ADMELOG) corrective regimen sliding scale   SubCutaneous at bedtime  insulin lispro Injectable (ADMELOG) 8 Unit(s) SubCutaneous three times a day before meals  isosorbide   dinitrate Tablet (ISORDIL) 40 milliGRAM(s) Oral three times a day  lactulose Syrup 15 Gram(s) Oral once  polyethylene glycol 3350 17 Gram(s) Oral two times a day  senna 2 Tablet(s) Oral at bedtime    MEDICATIONS  (PRN):  hydrOXYzine hydrochloride 25 milliGRAM(s) Oral two times a day PRN Anxiety    PAST MEDICAL & SURGICAL HISTORY:  HTN (hypertension)  CAD (coronary artery disease)2009; stent  Intracranial hemorrhage 2008  Respiratory arrest december 1st  Myocardial infarction, unspecified MI type, unspecified artery  History of coronary artery stent placement    FAMILY HISTORY:  Family history of meningitis (Sibling)  Brother, death at age 49 from complications of infection (June 2015)  Family history of hypertension in mother    Allergy   penicillins (Unknown)      ICU Vital Signs Last 24 Hrs  T(C): 36.7 (Max: 36.9)  HR: 69 ) (67 - 85)  Aug diastolic 100  BP: 104/63  (92/54 - 104/63)  BP(mean): 79  (68 - 79)  RR: 19 (13 - 34)  SpO2: 99%  (95% - 99%) on room air     I&O's Summary  IN: 1000 mL / OUT: 1700 mL / NET: -700 mL    PHYSICAL EXAM  Appearance: Normal, NAD  HEAD:Normocephalic  EYES: PERRLA, conjunctiva and sclera clear  NECK: Supple, No JVD  CHEST/LUNG: Clear to auscultation bilaterally; No wheezing  HEART: Normal S1, S2. No murmurs, rubs, or gallops  ABDOMEN: + Bowel sounds, Soft, NT, ND   EXTREMITIES:  2+ Peripheral Pulses, No clubbing, cyanosis, or edema  NEUROLOGY: non-focal, aaox3  SKIN: No rashes or lesions    Interpretation of Telemetry:                          11.8   10.03 )-----------( 228               35.7       128<L>  |  98  |  35<H>  ----------------------------<  151<H>  4.7   |  18<L>  |  1.60<1.52<1.68    Ca    10.0      25 Nov 2023 04:56  Phos  4.0     11-25  Mg     2.0     11-25    TPro  7.3  /  Alb  3.6  /  TBili  0.4  /  DBili  x   /  AST  22  /  ALT  36  /  AlkPhos  77  11-25                CAPILLARY BLOOD GLUCOSE      POCT Blood Glucose.: 154 mg/dL (25 Nov 2023 07:54)  POCT Blood Glucose.: 154 mg/dL (24 Nov 2023 22:55)  POCT Blood Glucose.: 148 mg/dL (24 Nov 2023 17:16)  POCT Blood Glucose.: 162 mg/dL (24 Nov 2023 12:12)  POCT Blood Glucose.: 173 mg/dL (24 Nov 2023 08:43)

## 2023-11-25 NOTE — PROGRESS NOTE ADULT - ASSESSMENT
59 yo M with HFrEF (LVIDd 6.4 cm, LVEF 32%), CAD s/p PCI (2008), HTN, DMT2 (A1c 8.3%) and CVA s/p TPA (2018), recently treated for PNA who initially presented to [facility] via EMS after syncope reportedly requiring defibrilation. Treated for ACS but left AMA to come to [facility]. On arrival ECG with ST depression in lateral leads. Intubated 11/1 for respiratory failure. Found to have COVID. L/RHC 11/1revealing  of LAD and RCA, elevated filling pressures and CI of 1.5 prompting placement of IABP. Extubated 11/3. IABP was weaned to off 11/7, then the following day on 11/8, developed PMVT cardiac arrest with prompt CPR and defibrillation, started on IV Lidocaine and Amio. IABP ultimately replaced on 11/9 for worsening hypotension. TTE 11/11 revealing LV thrombus.     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 AT evaluation launched 11/10, he is ABO A. Currently well supported on IABP at 1:1 without further arrhythmias.  He was discussed during TSC on 11/16 and was approved for listing, however was found to be Bacteremic with 11/17 Blc Cx growing mixed cultures Staph epi and Enterococcus faecalis and started on IV Vanco. Repeat cultures from 11/18 reveal NGTD and therefore was cleared by ID for listing. He's currently afebrile with downtrended, now plateaued mild leukocytosis on vanc. He is hemodynamically stable with MAPS 70-80s on IABP 1:1 on high dose oral vaosdilators/GDMT. His Cr is stable. He appears euvolemic on exam with net negative fluid balance over the past 24 hours off diuretics, however Na has been drifting downward. . He was listed on 11/22 UNOS Status 2, he is ABO, PRA's 0. Awaiting suitable donor.       Cardiac Studies  11/21/23 TTE: limited unable to rule out endocarditis, technically difficult study  11/1123 TTE: LVEF 22% (global), LV thrombus, normal RV size and function, mild MR, trace TR  11/1/23  LHC showed pLAD 70 % stenosis in the ostium portion of the segment and 100 % in-stent restenosis and in mRCA, 100 % stenosis.   11/1/23 RHC; RA 23 Vw 24, PA 52/33/40, PCWP 30 Vw 33, AO 85/66/73, PA sat 54.4%, CO/CI (F) 2.96/1.44, IABP was placed during the cath through R common femoral artery.   11/1/23 TTE: LVIDd 6.4cm , LVEF 32%, regional WMA, grade II DD,  TAPSE 1.7cm, mld MR, trace TR,     Bedside hemodynamics  11/3: IABP 1:1 RA 5; PA 27/12/18; CO/CI 5.6/2.6; MAP 90-100s.  11/4/23 IABP 1:3 CVP 4 PA 37/18 SvO2 83.8 CO/CI 11.2 CI 5.1  (in the setting of fever to 38.3C)  11/5 IABP 1:1 CVP 3 PA 48/27 SvO2 78.4% CO 8.2 CI 3.7   11/1 (IABP 1:1): CVP 1, PA 33/5/16, MvO2 74.3%

## 2023-11-25 NOTE — PROGRESS NOTE ADULT - SUBJECTIVE AND OBJECTIVE BOX
DEVORAH VALENCIA  MRN-[id number]  Patient is a 58y old  Male who presents with a chief complaint of cardiogenic shock (2023 08:37)    HPI:  pt seen and approx 1:30 pm  in [facility]    57yo M w/ hx HTN, CAD w/ 1 stent in , ICH () presenting with abn ekg. Patient presented to [facility] where he was found to have STEMI, recommended to get cath however patient did not want to get it there so it left and came here.  Patient initially had cough, congestion, fever, was placed on antibiotics on .  Started feeling nauseous and had a presyncopal event after which he presented to ED last night.  Had chest pain as well.  Chest pain is midsternal.  Not currently having chest pain.  Received 4 aspirin 30 min pta. (2023 15:11)      Hospital Course:    24 HOUR EVENTS:    REVIEW OF SYSTEMS:    CONSTITUTIONAL: No weakness, fevers or chills  EYES/ENT: No visual changes;  No vertigo or throat pain   NECK: No pain or stiffness  RESPIRATORY: No cough, wheezing, hemoptysis; No shortness of breath  CARDIOVASCULAR: No chest pain or palpitations  GASTROINTESTINAL: No abdominal or epigastric pain. No nausea, vomiting, or hematemesis; No diarrhea or constipation. No melena or hematochezia.  GENITOURINARY: No dysuria, frequency or hematuria  NEUROLOGICAL: No numbness or weakness  SKIN: No itching, rashes      ICU Vital Signs Last 24 Hrs  T(C): 37 (2023 16:00), Max: 37 (2023 16:00)  T(F): 98.6 (2023 16:00), Max: 98.6 (2023 16:00)  HR: 73 (2023 18:00) (67 - 78)  BP: 104/63 (2023 06:00) (92/54 - 104/63)  BP(mean): 79 (2023 06:00) (68 - 79)  ABP: --  ABP(mean): --  RR: 24 (2023 18:00) (16 - 28)  SpO2: 98% (2023 18:00) (96% - 99%)    O2 Parameters below as of 2023 17:00  Patient On (Oxygen Delivery Method): room air            CVP(mm Hg): 3 (23 @ 18:00) (0 - 16)  CO: --  CI: --  PA: /10 (23 @ 18:00) (24/10 - /)  PA(mean): 16 (23 @ 18:00) (4 - 23)  PA(direct): --  PCWP: --  LA: --  RA: --  SVR: --  SVRI: --  PVR: --  PVRI: --  I&O's Summary    2023 07:01  -  2023 07:00  --------------------------------------------------------  IN: 1000 mL / OUT: 1700 mL / NET: -700 mL    2023 07:01  -  2023 19:45  --------------------------------------------------------  IN: 610 mL / OUT: 1050 mL / NET: -440 mL        CAPILLARY BLOOD GLUCOSE    CAPILLARY BLOOD GLUCOSE      POCT Blood Glucose.: 128 mg/dL (2023 17:15)      PHYSICAL EXAM:  GENERAL: No acute distress, well-developed  HEAD:  Atraumatic, Normocephalic  EYES: EOMI, PERRLA, conjunctiva and sclera clear  NECK: Supple, no lymphadenopathy, no JVD  CHEST/LUNG: CTAB; No wheezes, rales, or rhonchi  HEART: Regular rate and rhythm. Normal S1/S2. No murmurs, rubs, or gallops  ABDOMEN: Soft, non-tender, non-distended; normal bowel sounds, no organomegaly  EXTREMITIES:  2+ peripheral pulses b/l, No clubbing, cyanosis, or edema  NEUROLOGY: A&O x 3, no focal deficits  SKIN: No rashes or lesions    ============================I/O===========================   I&O's Detail    2023 07:  -  2023 07:00  --------------------------------------------------------  IN:    Heparin: 320 mL    Oral Fluid: 680 mL  Total IN: 1000 mL    OUT:    Voided (mL): 1700 mL  Total OUT: 1700 mL    Total NET: -700 mL      2023 07:01  -  2023 19:45  --------------------------------------------------------  IN:    Heparin: 130 mL    Oral Fluid: 480 mL  Total IN: 610 mL    OUT:    Voided (mL): 1050 mL  Total OUT: 1050 mL    Total NET: -440 mL        ============================ LABS =========================                        11.8   10.03 )-----------( 228      ( 2023 04:56 )             35.7         128<L>  |  98  |  35<H>  ----------------------------<  151<H>  4.7   |  18<L>  |  1.60<H>    Ca    10.0      2023 04:56  Phos  4.0       Mg     2.0         TPro  7.3  /  Alb  3.6  /  TBili  0.4  /  DBili  x   /  AST  22  /  ALT  36  /  AlkPhos  77                  LIVER FUNCTIONS - ( 2023 04:56 )  Alb: 3.6 g/dL / Pro: 7.3 g/dL / ALK PHOS: 77 U/L / ALT: 36 U/L / AST: 22 U/L / GGT: x           PT/INR - ( 2023 17:28 )   PT: 12.8 sec;   INR: 1.17 ratio         PTT - ( 2023 17:28 )  PTT:66.2 sec    Blood Gas Venous - Lactate: 1.4 mmol/L (23 @ 16:07)    Urinalysis Basic - ( 2023 04:56 )    Color: x / Appearance: x / SG: x / pH: x  Gluc: 151 mg/dL / Ketone: x  / Bili: x / Urobili: x   Blood: x / Protein: x / Nitrite: x   Leuk Esterase: x / RBC: x / WBC x   Sq Epi: x / Non Sq Epi: x / Bacteria: x      ======================Micro/Rad/Cardio=================  Telemtry: Reviewed   EKG: Reviewed  CXR: Reviewed  Culture: Reviewed   Echo:   Cath: Cardiac Cath Lab - Adult:   [facility]  Department of Cardiology  [address] [place] [address]  [phone]  Cath Lab Report -- Comprehensive Report  Patient: LD VALENCIA  Study date: 2017  Account number: [id number]  MR number: [id number]  : 1965  Gender: Male  Race: W  Case Physician(s):  Jairon Holguin M.D.  Referring Physician:  Luc Lynn M.D.  INDICATIONS: Unstable angina - CCS4.  HISTORY: The patient has a history of coronary artery disease. The patient  hashypertension and medication-treated dyslipidemia.  PROCEDURE:  --  Left heart catheterization with ventriculography.  --  Left coronary angiography.  --  Right coronary angiography.  TECHNIQUE: The risks and alternatives of the procedures and conscious  sedation were explained to the patient and informed consent was obtained.  Cardiac catheterization performed electively.  Local anesthetic given. Right radial artery access. A 6FR PRELUDE KIT was  inserted in the vessel. Left heart catheterization. Ventriculography was  performed. A 5FR FR4.0 EXPO catheter was utilized. Left coronary artery  angiography. The vessel was injected utilizing a 5FR FL3.5 EXPO catheter.  Right coronary artery angiography. The vessel was injected utilizing a 5FR  FR4.0 EXPO catheter. RADIATION EXPOSURE: 1.1 min.  CONTRAST GIVEN: Omnipaque 55 ml.  MEDICATIONS GIVEN: Midazolam, 1 mg, IV. Fentanyl, 25 mcg, IV. Verapamil  (Isoptin, Calan, Covera), 2.5 mg, IA. Heparin, 3000 units, IA.  VENTRICLES: Global left ventricular function was moderately depressed. EF  estimated was 40 %.  CORONARY VESSELS: The coronary circulation is right dominant.  LM:   --  LM: Normal.  LAD:   --  Proximal LAD: There was a 50 % stenosis.  CX:   --  Circumflex: Normal.  RCA:   --  Mid RCA: There was a 40 % stenosis.  --  Distal RCA: There was a 50 % stenosis.  COMPLICATIONS: There were no complications.  DIAGNOSTIC RECOMMENDATIONS: The patient should continue with the present  medications.  Prepared and signed by  Jairon Holguin M.D.  Signed 2017 12:20:13  HEMODYNAMIC TABLES  Pressures:  Baseline/ Room Air  Pressures:  - HR: 78  Pressures:  - Rhythm:  Pressures:  -- Aortic Pressure (S/D/M): --/--/99  Pressures:  -- Left Ventricle (s/edp): 157/39/--  Outputs:  Baseline/ Room Air  Outputs:  -- CALCULATIONS[age]  Outputs:  -- CALCULATIONS: Body Surface Area: 2.05  Outputs:  -- CALCULATIONS: Height in cm: 175.00  Outputs:  -- CALCULATIONS: Sex: Male  Outputs:  -- CALCULATIONS: Weight in k.40 (17 @ 21:55)    ======================================================  PAST MEDICAL & SURGICAL HISTORY:  HTN (hypertension)      CAD (coronary artery disease)  ; stent      Intracranial hemorrhage        Respiratory arrest        Myocardial infarction, unspecified MI type, unspecified artery      History of coronary artery stent placement  ====================ASSESSMENT ==============  58 male with HTN, CAD (s/p PCI ), HFrEF, CVA , and T2DM presenting with chest pressure and unknown tachycardia that was shocked x1, [facility]  found to have in-stent restenosis of pLAD and  of RCA with elevated RA and PA pressures and severely decreased EF 32%. Admitted to CICU for management of cardiogenic shock and ADHF requiring IABP  -, with hospital course c/b vfib arrest requiring reinsertion of IABP. Currently undergoing workup for AT evaluation with HF.    Plan:  ====================== NEUROLOGY=====================  # Anxiety  - No Seroquel/antipsychotics since vfib arrest and prolonged QTC  - Psych Eval, recommended SSRI, but pt. refused   - Psych recommending atarax PRN  - Continue to monitor mental status    # PT/Conditioning  - Continue band exercised while on bedrest s/t IABP, IS    ==================== RESPIRATORY======================  # Acute Hypoxemic Respiratory Failure  - s/p x2 intubations for cardiogenic pulm edema and the in setting of cardiac arrest, resolved - extubated 11/10  - Currently on room air with spO2 mid 90s  - Continue incentive spirometry and monitoring of sp02    # Asthma  - c/w albuterol, symbicort and spiriva  - On trelegy at home    ====================CARDIOVASCULAR====================  # Vfib arrest iso ischemia  - Lido gtt off   - PO Amio load - total of 5g per EP complete , continue PO amio  - Keep K > 4, Mag > 2.2     # Cardiogenic shock requiring IABP (- , -)  - Likely 2/2 NSTEMI and ADHF  -  [facility]: pLAD 100 % in-stent restenosis & mRCA, 100 %. PCWP 30. IABP placed.  -  TTE: LV dilated. EF 32 %. Regional WMAs present, mod (grade 2) LV diastolic dysfunction  -  TTE: EF 22% and + LV thrombus  - PRN Bumex IVP as needed  - IABP swapped  to RFA, continue 1:1 support  - Off Milrinone gtt @ 7:30 am   - Currently on hydralazine 100 TID and ISD 40 TID for AL reduction  - Continue romel 25 daily and coreg 25 BID for GDMT  - Listed OHT status 2 , f/u HF recs    # NSTEMI iso stent re-occlusion of pLAD and 100%  of RCA  - EKG on admission w/LBBB  - DAPT: c/w ASA, Brilinta d/c'd per transplant w/u  - c/w lipitor 80  - cMR deferred given necessity of IABP  - CT sx not recommending CABG, undergoing AT eval    # LV thrombus  - c/w heparin gtt    ===================== RENAL =========================  # Non-oliguric ARIC   - sCr stable, Baseline Cr: 1-  - Renal US - no evidence of renal artery stenosis  - Trend BMP, lytes daily, replace as needed  - Continue Strict I/Os, avoid nephrotoxins    # Mild Hyponatremia/Hyperkalemia  - iso ARIC and or new CKD baseline  - Assess volume status and consider diuresis if hypervolemic   - 1.2L Fluid restriction, gtts in NS where applicable   - Low K diet   - Repeat CMP today    ==================== GASTROINTESTINAL===================  - c/w diet  - continue to monitor for BM  - c/w bowel regimen    ===================HEMATOLOGIC/ONC ===================  # VTE prophylaxis   - c/w heparin gtt given LV Thrombus and IABP  - H/H and platelets stable    =======================    ENDOCRINE  =====================  # Type 2 DM  - A1c 8.3, glucose   - Continue lantus, premeal, low ISS    ========================INFECTIOUS DISEASE================  # Enterococcus faecalis bacteremia  - BCx + for enterococcus faecalis x2, Staph epi x1 (likely contaminant)- pan sensitive   - Urine cx  + enterococcus faecalis  - BCx  NGTD  - IABP site swapped to RFA   - continue vancomycin 1g q12h (-  - CT A/P negative for infectious pathology    # COVID, resolved  - Off airborne precautions     # Pre-transplant ID w/u   - Trend ID recs for serologies   - Colonoscopy  - normal   - Chest CT  - improved LLL aeration  - Pt. requiring vaccines prior to transplant, would initiate series accordingly        Patient requires continuous monitoring with bedside rhythm monitoring, pulse ox monitoring, and intermittent blood gas analysis. Care plan discussed with ICU care team. Patient remained critical and at risk for life threatening decompensation.  Patient seen, examined and plan discussed with CCU team during rounds.     I have personally provided ____ minutes of critical care time excluding time spent on separate procedures, in addition to initial critical care time provided by the CICU Attending, Dr. Lees.    By signing my name below, I, Kalyn Sousa, attest that this documentation has been prepared under the direction and in the presence of Kirsty Davis NP.  Electronically signed: Rosalba Booth, 23 @ 19:45    I, Kirsty Davis , personally performed the services described in this documentation. all medical record entries made by the eileenibmartha were at my direction and in my presence. I have reviewed the chart and agree that the record reflects my personal performance and is accurate and complete  Electronically signed: Kirsty Davis NP.        DEVORAH VALENCIA  MRN-[id number]  Patient is a 58y old  Male who presents with a chief complaint of cardiogenic shock (2023 08:37)    HPI:  pt seen and approx 1:30 pm  in [facility]    57yo M w/ hx HTN, CAD w/ 1 stent in , ICH () presenting with abn ekg. Patient presented to [facility] where he was found to have STEMI, recommended to get cath however patient did not want to get it there so it left and came here.  Patient initially had cough, congestion, fever, was placed on antibiotics on .  Started feeling nauseous and had a presyncopal event after which he presented to ED last night.  Had chest pain as well.  Chest pain is midsternal.  Not currently having chest pain.  Received 4 aspirin 30 min pta. (2023 15:11)      Hospital Course:    24 HOUR EVENTS:    REVIEW OF SYSTEMS:    CONSTITUTIONAL: No weakness, fevers or chills  EYES/ENT: No visual changes;  No vertigo or throat pain   NECK: No pain or stiffness  RESPIRATORY: No cough, wheezing, hemoptysis; No shortness of breath  CARDIOVASCULAR: No chest pain or palpitations  GASTROINTESTINAL: No abdominal or epigastric pain. No nausea, vomiting, or hematemesis; No diarrhea or constipation. No melena or hematochezia.  GENITOURINARY: No dysuria, frequency or hematuria  NEUROLOGICAL: No numbness or weakness  SKIN: No itching, rashes      ICU Vital Signs Last 24 Hrs  T(C): 37 (2023 16:00), Max: 37 (2023 16:00)  T(F): 98.6 (2023 16:00), Max: 98.6 (2023 16:00)  HR: 73 (2023 18:00) (67 - 78)  BP: 104/63 (2023 06:00) (92/54 - 104/63)  BP(mean): 79 (2023 06:00) (68 - 79)  ABP: --  ABP(mean): --  RR: 24 (2023 18:00) (16 - 28)  SpO2: 98% (2023 18:00) (96% - 99%)    O2 Parameters below as of 2023 17:00  Patient On (Oxygen Delivery Method): room air            CVP(mm Hg): 3 (23 @ 18:00) (0 - 16)  CO: --  CI: --  PA: /10 (23 @ 18:00) (24/10 - /)  PA(mean): 16 (23 @ 18:00) (4 - 23)  PA(direct): --  PCWP: --  LA: --  RA: --  SVR: --  SVRI: --  PVR: --  PVRI: --  I&O's Summary    2023 07:01  -  2023 07:00  --------------------------------------------------------  IN: 1000 mL / OUT: 1700 mL / NET: -700 mL    2023 07:01  -  2023 19:45  --------------------------------------------------------  IN: 610 mL / OUT: 1050 mL / NET: -440 mL        CAPILLARY BLOOD GLUCOSE    CAPILLARY BLOOD GLUCOSE      POCT Blood Glucose.: 128 mg/dL (2023 17:15)      PHYSICAL EXAM:  GENERAL: No acute distress, well-developed  HEAD:  Atraumatic, Normocephalic  EYES: EOMI, PERRLA, conjunctiva and sclera clear  NECK: Supple, no lymphadenopathy, no JVD  CHEST/LUNG: CTAB; No wheezes, rales, or rhonchi  HEART: Regular rate and rhythm. Normal S1/S2. No murmurs, rubs, or gallops  ABDOMEN: Soft, non-tender, non-distended; normal bowel sounds, no organomegaly  EXTREMITIES:  2+ peripheral pulses b/l, No clubbing, cyanosis, or edema  NEUROLOGY: A&O x 3, no focal deficits  SKIN: No rashes or lesions    ============================I/O===========================   I&O's Detail    2023 07:  -  2023 07:00  --------------------------------------------------------  IN:    Heparin: 320 mL    Oral Fluid: 680 mL  Total IN: 1000 mL    OUT:    Voided (mL): 1700 mL  Total OUT: 1700 mL    Total NET: -700 mL      2023 07:01  -  2023 19:45  --------------------------------------------------------  IN:    Heparin: 130 mL    Oral Fluid: 480 mL  Total IN: 610 mL    OUT:    Voided (mL): 1050 mL  Total OUT: 1050 mL    Total NET: -440 mL        ============================ LABS =========================                        11.8   10.03 )-----------( 228      ( 2023 04:56 )             35.7         128<L>  |  98  |  35<H>  ----------------------------<  151<H>  4.7   |  18<L>  |  1.60<H>    Ca    10.0      2023 04:56  Phos  4.0       Mg     2.0         TPro  7.3  /  Alb  3.6  /  TBili  0.4  /  DBili  x   /  AST  22  /  ALT  36  /  AlkPhos  77                  LIVER FUNCTIONS - ( 2023 04:56 )  Alb: 3.6 g/dL / Pro: 7.3 g/dL / ALK PHOS: 77 U/L / ALT: 36 U/L / AST: 22 U/L / GGT: x           PT/INR - ( 2023 17:28 )   PT: 12.8 sec;   INR: 1.17 ratio         PTT - ( 2023 17:28 )  PTT:66.2 sec    Blood Gas Venous - Lactate: 1.4 mmol/L (23 @ 16:07)    Urinalysis Basic - ( 2023 04:56 )    Color: x / Appearance: x / SG: x / pH: x  Gluc: 151 mg/dL / Ketone: x  / Bili: x / Urobili: x   Blood: x / Protein: x / Nitrite: x   Leuk Esterase: x / RBC: x / WBC x   Sq Epi: x / Non Sq Epi: x / Bacteria: x      ======================Micro/Rad/Cardio=================  Telemtry: Reviewed   EKG: Reviewed  CXR: Reviewed  Culture: Reviewed   Echo:   Cath: Cardiac Cath Lab - Adult:   [facility]  Department of Cardiology  [address] [place] [address]  [phone]  Cath Lab Report -- Comprehensive Report  Patient: LD VALENCIA  Study date: 2017  Account number: [id number]  MR number: [id number]  : 1965  Gender: Male  Race: W  Case Physician(s):  Jairon Holguin M.D.  Referring Physician:  Luc Lynn M.D.  INDICATIONS: Unstable angina - CCS4.  HISTORY: The patient has a history of coronary artery disease. The patient  hashypertension and medication-treated dyslipidemia.  PROCEDURE:  --  Left heart catheterization with ventriculography.  --  Left coronary angiography.  --  Right coronary angiography.  TECHNIQUE: The risks and alternatives of the procedures and conscious  sedation were explained to the patient and informed consent was obtained.  Cardiac catheterization performed electively.  Local anesthetic given. Right radial artery access. A 6FR PRELUDE KIT was  inserted in the vessel. Left heart catheterization. Ventriculography was  performed. A 5FR FR4.0 EXPO catheter was utilized. Left coronary artery  angiography. The vessel was injected utilizing a 5FR FL3.5 EXPO catheter.  Right coronary artery angiography. The vessel was injected utilizing a 5FR  FR4.0 EXPO catheter. RADIATION EXPOSURE: 1.1 min.  CONTRAST GIVEN: Omnipaque 55 ml.  MEDICATIONS GIVEN: Midazolam, 1 mg, IV. Fentanyl, 25 mcg, IV. Verapamil  (Isoptin, Calan, Covera), 2.5 mg, IA. Heparin, 3000 units, IA.  VENTRICLES: Global left ventricular function was moderately depressed. EF  estimated was 40 %.  CORONARY VESSELS: The coronary circulation is right dominant.  LM:   --  LM: Normal.  LAD:   --  Proximal LAD: There was a 50 % stenosis.  CX:   --  Circumflex: Normal.  RCA:   --  Mid RCA: There was a 40 % stenosis.  --  Distal RCA: There was a 50 % stenosis.  COMPLICATIONS: There were no complications.  DIAGNOSTIC RECOMMENDATIONS: The patient should continue with the present  medications.  Prepared and signed by  Jairon Holguin M.D.  Signed 2017 12:20:13  HEMODYNAMIC TABLES  Pressures:  Baseline/ Room Air  Pressures:  - HR: 78  Pressures:  - Rhythm:  Pressures:  -- Aortic Pressure (S/D/M): --/--/99  Pressures:  -- Left Ventricle (s/edp): 157/39/--  Outputs:  Baseline/ Room Air  Outputs:  -- CALCULATIONS[age]  Outputs:  -- CALCULATIONS: Body Surface Area: 2.05  Outputs:  -- CALCULATIONS: Height in cm: 175.00  Outputs:  -- CALCULATIONS: Sex: Male  Outputs:  -- CALCULATIONS: Weight in k.40 (17 @ 21:55)    ======================================================  PAST MEDICAL & SURGICAL HISTORY:  HTN (hypertension)      CAD (coronary artery disease)  ; stent      Intracranial hemorrhage        Respiratory arrest        Myocardial infarction, unspecified MI type, unspecified artery      History of coronary artery stent placement  ====================ASSESSMENT ==============  58 male with HTN, CAD (s/p PCI ), HFrEF, CVA , and T2DM presenting with chest pressure and unknown tachycardia that was shocked x1, [facility]  found to have in-stent restenosis of pLAD and  of RCA with elevated RA and PA pressures and severely decreased EF 32%. Admitted to CICU for management of cardiogenic shock and ADHF requiring IABP  -, with hospital course c/b vfib arrest requiring reinsertion of IABP. Currently undergoing workup for AT evaluation with HF.    Plan:  ====================== NEUROLOGY=====================  # Anxiety  - No Seroquel/antipsychotics since vfib arrest and prolonged QTC  - Psych Eval, recommended SSRI, but pt. refused   - Psych recommending atarax PRN  - Continue to monitor mental status    # PT/Conditioning  - Continue band exercised while on bedrest s/t IABP, IS    ==================== RESPIRATORY======================  # Acute Hypoxemic Respiratory Failure  - s/p x2 intubations for cardiogenic pulm edema and the in setting of cardiac arrest, resolved - extubated 11/10  - Currently on room air with spO2 mid 90s  - Continue incentive spirometry and monitoring of sp02    # Asthma  - c/w albuterol, symbicort and spiriva  - On trelegy at home    ====================CARDIOVASCULAR====================  # Vfib arrest iso ischemia  - Lido gtt off   - PO Amio load - total of 5g per EP complete , continue PO amio  - Keep K > 4, Mag > 2.2     # Cardiogenic shock requiring IABP (- , -)  - Likely 2/2 NSTEMI and ADHF  -  [facility]: pLAD 100 % in-stent restenosis & mRCA, 100 %. PCWP 30. IABP placed.  -  TTE: LV dilated. EF 32 %. Regional WMAs present, mod (grade 2) LV diastolic dysfunction  -  TTE: EF 22% and + LV thrombus  - PRN Bumex IVP as needed  - IABP swapped  to RFA, continue 1:1 support  - Off Milrinone gtt @ 7:30 am   - Currently on hydralazine 100 TID and ISD 40 TID for AL reduction  - Continue romel 25 daily and coreg 25 BID for GDMT  - Listed OHT status 2 , f/u HF recs    # NSTEMI iso stent re-occlusion of pLAD and 100%  of RCA  - EKG on admission w/LBBB  - DAPT: c/w ASA, Brilinta d/c'd per transplant w/u  - c/w lipitor 80  - cMR deferred given necessity of IABP  - CT sx not recommending CABG, undergoing AT eval    # LV thrombus  - c/w heparin gtt    ===================== RENAL =========================  # Non-oliguric ARIC   - sCr stable, Baseline Cr: 1-  - Renal US - no evidence of renal artery stenosis  - Trend BMP, lytes daily, replace as needed  - Continue Strict I/Os, avoid nephrotoxins    # Mild Hyponatremia/Hyperkalemia  - iso ARIC and or new CKD baseline  - Assess volume status and consider diuresis if hypervolemic   - 1.2L Fluid restriction, gtts in NS where applicable   - Low K diet   - Repeat CMP today    ==================== GASTROINTESTINAL===================  - c/w diet  - continue to monitor for BM  - c/w bowel regimen    ===================HEMATOLOGIC/ONC ===================  # VTE prophylaxis   - c/w heparin gtt given LV Thrombus and IABP  - H/H and platelets stable    =======================    ENDOCRINE  =====================  # Type 2 DM  - A1c 8.3, glucose   - Continue lantus, premeal, low ISS    ========================INFECTIOUS DISEASE================  # Enterococcus faecalis bacteremia  - BCx + for enterococcus faecalis x2, Staph epi x1 (likely contaminant)- pan sensitive   - Urine cx  + enterococcus faecalis  - BCx  NGTD  - IABP site swapped to RFA   - continue vancomycin 1g q12h (-  - CT A/P negative for infectious pathology    # COVID, resolved  - Off airborne precautions     # Pre-transplant ID w/u   - Trend ID recs for serologies   - Colonoscopy  - normal   - Chest CT  - improved LLL aeration  - Pt. requiring vaccines prior to transplant, would initiate series accordingly        Patient requires continuous monitoring with bedside rhythm monitoring, pulse ox monitoring, and intermittent blood gas analysis. Care plan discussed with ICU care team. Patient remained critical and at risk for life threatening decompensation.  Patient seen, examined and plan discussed with CCU team during rounds.     I have personally provided _30___ minutes of critical care time excluding time spent on separate procedures, in addition to initial critical care time provided by the CICU Attending, Dr. Lees.    By signing my name below, I, Kalyn Sousa, attest that this documentation has been prepared under the direction and in the presence of Kirsty Davis NP.  Electronically signed: Rosalba Booth, 23 @ 19:45    I, Kirsty Davis , personally performed the services described in this documentation. all medical record entries made by the eileenibe were at my direction and in my presence. I have reviewed the chart and agree that the record reflects my personal performance and is accurate and complete  Electronically signed: Kirsty Davis NP.

## 2023-11-25 NOTE — PROGRESS NOTE ADULT - PROBLEM SELECTOR PLAN 1
- L IABP at 1:1, placed 11/9. On AC with Heparin infusion (also for LV thrombus); s/p exchange to LFA given high grade bacteremia on 11/20  - Currently on Coreg 25 mg BID hold for MAP <65  - Continue HDZN 100 mg TID and ISDN 40 mg TID, hold for MAP <65  - Continue Spironolactone 25 mg QD  - PRN diuretics to target net even/- 500 fluid balance  - AT evaluation: launched 11/10. Accepted for transplant, currently listed UNOS Status 2. ABO A.   -cPRA 0% 11/13  - Last Brilinta dose was on 11/13  - Please keep primary Dr. Banuelos [phone] in the loop per family request.

## 2023-11-25 NOTE — PROGRESS NOTE ADULT - ASSESSMENT
====================ASSESSMENT ==============  58 male with HTN, CAD (s/p PCI 2008), HFrEF, CVA 2018, and T2DM presenting with chest pressure and unknown tachycardia that was shocked x1, [facility] 11/1 found to have in-stent restenosis of pLAD and  of RCA with elevated RA and PA pressures and severely decreased EF 32%. Admitted to CICU for management of cardiogenic shock and ADHF requiring IABP 11/1 -11/7, with hospital course c/b vfib arrest requiring reinsertion of IABP. Currently undergoing workup for AT evaluation with HF.    Plan:  ====================== NEUROLOGY=====================  # Anxiety  - No Seroquel/antipsychotics since vfib arrest and prolonged QTC  - Psych Eval, recommended SSRI, but pt. refused   - Psych recommending atarax PRN  - Continue to monitor mental status    # PT/Conditioning  - Continue band exercised while on bedrest s/t IABP, IS    ==================== RESPIRATORY======================  # Acute Hypoxemic Respiratory Failure  - s/p x2 intubations for cardiogenic pulm edema and the in setting of cardiac arrest, resolved - extubated 11/10  - Currently on room air with spO2 mid 90s  - Continue incentive spirometry and monitoring of sp02    # Asthma  - c/w albuterol, symbicort and spiriva  - On trelegy at home    ====================CARDIOVASCULAR====================  # Vfib arrest iso ischemia  - Lido gtt off 11/13  - PO Amio load - total of 5g per EP complete 11/17, continue PO amio  - Keep K > 4, Mag > 2.2     # Cardiogenic shock requiring IABP (11/1- 11/7, 11/9-)  - Likely 2/2 NSTEMI and ADHF  - 11/1 LHC: pLAD 100 % in-stent restenosis & mRCA, 100 %. PCWP 30. IABP placed.  - 11/1 TTE: LV dilated. EF 32 %. Regional WMAs present, mod (grade 2) LV diastolic dysfunction  - 11/2 TTE: EF 22% and + LV thrombus  - PRN Bumex IVP as needed  - IABP swapped 11/20 to RFA, continue 1:1 support  - Off Milrinone gtt @ 7:30 am 11/11  - Currently on hydralazine 100 TID and ISD 40 TID for AL reduction  - Continue romel 25 daily and coreg 25 BID for GDMT  - Listed OHT status 2 11/22, f/u HF recs    # NSTEMI iso stent re-occlusion of pLAD and 100%  of RCA  - EKG on admission w/LBBB  - DAPT: c/w ASA, Brilinta d/c'd per transplant w/u  - c/w lipitor 80  - cMR deferred given necessity of IABP  - CT sx not recommending CABG, undergoing AT eval    # LV thrombus  - c/w heparin gtt    ===================== RENAL =========================  # Non-oliguric ARIC   - sCr stable, Baseline Cr: 1-1.22  - Renal US - no evidence of renal artery stenosis  - Trend BMP, lytes daily, replace as needed  - Continue Strict I/Os, avoid nephrotoxins    # Mild Hyponatremia/Hyperkalemia  - iso ARIC and or new CKD baseline  - Assess volume status and consider diuresis if hypervolemic   - 1.2L Fluid restriction, gtts in NS where applicable   - Low K diet   - Repeat CMP today    ==================== GASTROINTESTINAL===================  - c/w diet  - continue to monitor for BM  - c/w bowel regimen    ===================HEMATOLOGIC/ONC ===================  # VTE prophylaxis   - c/w heparin gtt given LV Thrombus and IABP  - H/H and platelets stable    =======================    ENDOCRINE  =====================  # Type 2 DM  - A1c 8.3, glucose   - Continue lantus, premeal, low ISS    ========================INFECTIOUS DISEASE================  # Enterococcus faecalis bacteremia  - BCx 11/17+ for enterococcus faecalis x2, Staph epi x1 (likely contaminant)- pan sensitive   - Urine cx 11/18 + enterococcus faecalis  - BCx 11/18 NGTD  - IABP site swapped to RFA 11/20  - continue vancomycin 1g q12h (11/18-  - CT A/P negative for infectious pathology    # COVID, resolved  - Off airborne precautions 11/11    # Pre-transplant ID w/u   - Trend ID recs for serologies   - Colonoscopy 11/17 - normal   - Chest CT 11/17 - improved LLL aeration  - Pt. requiring vaccines prior to transplant, would initiate series accordingly

## 2023-11-25 NOTE — PROGRESS NOTE ADULT - SUBJECTIVE AND OBJECTIVE BOX
Subjective:  No acute events.     Medications:  aMIOdarone    Tablet 200 milliGRAM(s) Oral daily  aMIOdarone    Tablet   Oral   aspirin  chewable 81 milliGRAM(s) Oral daily  atorvastatin 80 milliGRAM(s) Oral at bedtime  budesonide  80 MICROgram(s)/formoterol 4.5 MICROgram(s) Inhaler 2 Puff(s) Inhalation two times a day  carvedilol 25 milliGRAM(s) Oral every 12 hours  chlorhexidine 2% Cloths 1 Application(s) Topical daily  dextrose 50% Injectable 12.5 Gram(s) IV Push once  dextrose 50% Injectable 25 Gram(s) IV Push once  heparin  Infusion 1600 Unit(s)/Hr IV Continuous <Continuous>  hydrALAZINE 100 milliGRAM(s) Oral three times a day  hydrOXYzine hydrochloride 25 milliGRAM(s) Oral two times a day PRN  insulin glargine Injectable (LANTUS) 12 Unit(s) SubCutaneous at bedtime  insulin lispro (ADMELOG) corrective regimen sliding scale   SubCutaneous three times a day before meals  insulin lispro (ADMELOG) corrective regimen sliding scale   SubCutaneous at bedtime  insulin lispro Injectable (ADMELOG) 8 Unit(s) SubCutaneous three times a day before meals  isosorbide   dinitrate Tablet (ISORDIL) 40 milliGRAM(s) Oral three times a day  lactulose Syrup 15 Gram(s) Oral once  polyethylene glycol 3350 17 Gram(s) Oral two times a day  senna 2 Tablet(s) Oral at bedtime    Vitals:  T(C): 36.7 (11-25-23 @ 11:00), Max: 36.9 (11-24-23 @ 19:00)  HR: 74 (11-25-23 @ 12:00) (67 - 85)  BP: 104/63 (11-25-23 @ 06:00) (92/54 - 104/63)  BP(mean): 79 (11-25-23 @ 06:00) (68 - 79)  RR: 24 (11-25-23 @ 12:00) (16 - 34)  SpO2: 96% (11-25-23 @ 12:00) (96% - 99%)      I&O's Summary    24 Nov 2023 07:01  -  25 Nov 2023 07:00  --------------------------------------------------------  IN: 1000 mL / OUT: 1700 mL / NET: -700 mL    25 Nov 2023 07:01  -  25 Nov 2023 12:29  --------------------------------------------------------  IN: 172 mL / OUT: 600 mL / NET: -428 mL        Physical Exam:  General: No distress. Comfortable.  HEENT: EOM intact.  Neck: Neck supple. JVP not elevated. No masses  Chest: Clear to auscultation bilaterally  CV: Normal S1 and S2. No murmurs, rub, or gallops. Radial pulses normal, warm peripherally  Abdomen: Soft, non-distended, non-tender  Skin: No rashes or skin breakdown  Extremities: No LE edema  Neurology: Alert and oriented times three. Sensation intact  Psych: Affect normal        Labs:                        11.8   10.03 )-----------( 228      ( 25 Nov 2023 04:56 )             35.7     11-25    128<L>  |  98  |  35<H>  ----------------------------<  151<H>  4.7   |  18<L>  |  1.60<H>    Ca    10.0      25 Nov 2023 04:56  Phos  4.0     11-25  Mg     2.0     11-25    TPro  7.3  /  Alb  3.6  /  TBili  0.4  /  DBili  x   /  AST  22  /  ALT  36  /  AlkPhos  77  11-25

## 2023-11-26 LAB
ALBUMIN SERPL ELPH-MCNC: 3.9 G/DL — SIGNIFICANT CHANGE UP (ref 3.3–5)
ALBUMIN SERPL ELPH-MCNC: 3.9 G/DL — SIGNIFICANT CHANGE UP (ref 3.3–5)
ALP SERPL-CCNC: 79 U/L — SIGNIFICANT CHANGE UP (ref 40–120)
ALP SERPL-CCNC: 79 U/L — SIGNIFICANT CHANGE UP (ref 40–120)
ALT FLD-CCNC: 45 U/L — SIGNIFICANT CHANGE UP (ref 10–45)
ALT FLD-CCNC: 45 U/L — SIGNIFICANT CHANGE UP (ref 10–45)
ANION GAP SERPL CALC-SCNC: 11 MMOL/L — SIGNIFICANT CHANGE UP (ref 5–17)
ANION GAP SERPL CALC-SCNC: 11 MMOL/L — SIGNIFICANT CHANGE UP (ref 5–17)
APTT BLD: 72 SEC — HIGH (ref 24.5–35.6)
APTT BLD: 72 SEC — HIGH (ref 24.5–35.6)
AST SERPL-CCNC: 25 U/L — SIGNIFICANT CHANGE UP (ref 10–40)
AST SERPL-CCNC: 25 U/L — SIGNIFICANT CHANGE UP (ref 10–40)
BASOPHILS # BLD AUTO: 0.05 K/UL — SIGNIFICANT CHANGE UP (ref 0–0.2)
BASOPHILS # BLD AUTO: 0.05 K/UL — SIGNIFICANT CHANGE UP (ref 0–0.2)
BASOPHILS NFR BLD AUTO: 0.5 % — SIGNIFICANT CHANGE UP (ref 0–2)
BASOPHILS NFR BLD AUTO: 0.5 % — SIGNIFICANT CHANGE UP (ref 0–2)
BILIRUB SERPL-MCNC: 0.4 MG/DL — SIGNIFICANT CHANGE UP (ref 0.2–1.2)
BILIRUB SERPL-MCNC: 0.4 MG/DL — SIGNIFICANT CHANGE UP (ref 0.2–1.2)
BLD GP AB SCN SERPL QL: NEGATIVE — SIGNIFICANT CHANGE UP
BLD GP AB SCN SERPL QL: NEGATIVE — SIGNIFICANT CHANGE UP
BUN SERPL-MCNC: 37 MG/DL — HIGH (ref 7–23)
BUN SERPL-MCNC: 37 MG/DL — HIGH (ref 7–23)
CALCIUM SERPL-MCNC: 10 MG/DL — SIGNIFICANT CHANGE UP (ref 8.4–10.5)
CALCIUM SERPL-MCNC: 10 MG/DL — SIGNIFICANT CHANGE UP (ref 8.4–10.5)
CHLORIDE SERPL-SCNC: 97 MMOL/L — SIGNIFICANT CHANGE UP (ref 96–108)
CHLORIDE SERPL-SCNC: 97 MMOL/L — SIGNIFICANT CHANGE UP (ref 96–108)
CO2 SERPL-SCNC: 21 MMOL/L — LOW (ref 22–31)
CO2 SERPL-SCNC: 21 MMOL/L — LOW (ref 22–31)
CREAT SERPL-MCNC: 1.56 MG/DL — HIGH (ref 0.5–1.3)
CREAT SERPL-MCNC: 1.56 MG/DL — HIGH (ref 0.5–1.3)
EGFR: 51 ML/MIN/1.73M2 — LOW
EGFR: 51 ML/MIN/1.73M2 — LOW
EOSINOPHIL # BLD AUTO: 0.55 K/UL — HIGH (ref 0–0.5)
EOSINOPHIL # BLD AUTO: 0.55 K/UL — HIGH (ref 0–0.5)
EOSINOPHIL NFR BLD AUTO: 5.8 % — SIGNIFICANT CHANGE UP (ref 0–6)
EOSINOPHIL NFR BLD AUTO: 5.8 % — SIGNIFICANT CHANGE UP (ref 0–6)
GLUCOSE BLDC GLUCOMTR-MCNC: 137 MG/DL — HIGH (ref 70–99)
GLUCOSE BLDC GLUCOMTR-MCNC: 137 MG/DL — HIGH (ref 70–99)
GLUCOSE BLDC GLUCOMTR-MCNC: 166 MG/DL — HIGH (ref 70–99)
GLUCOSE BLDC GLUCOMTR-MCNC: 166 MG/DL — HIGH (ref 70–99)
GLUCOSE BLDC GLUCOMTR-MCNC: 171 MG/DL — HIGH (ref 70–99)
GLUCOSE BLDC GLUCOMTR-MCNC: 171 MG/DL — HIGH (ref 70–99)
GLUCOSE BLDC GLUCOMTR-MCNC: 222 MG/DL — HIGH (ref 70–99)
GLUCOSE BLDC GLUCOMTR-MCNC: 222 MG/DL — HIGH (ref 70–99)
GLUCOSE SERPL-MCNC: 148 MG/DL — HIGH (ref 70–99)
GLUCOSE SERPL-MCNC: 148 MG/DL — HIGH (ref 70–99)
HCT VFR BLD CALC: 35.4 % — LOW (ref 39–50)
HCT VFR BLD CALC: 35.4 % — LOW (ref 39–50)
HGB BLD-MCNC: 11.6 G/DL — LOW (ref 13–17)
HGB BLD-MCNC: 11.6 G/DL — LOW (ref 13–17)
IMM GRANULOCYTES NFR BLD AUTO: 0.6 % — SIGNIFICANT CHANGE UP (ref 0–0.9)
IMM GRANULOCYTES NFR BLD AUTO: 0.6 % — SIGNIFICANT CHANGE UP (ref 0–0.9)
INR BLD: 1.21 RATIO — HIGH (ref 0.85–1.18)
INR BLD: 1.21 RATIO — HIGH (ref 0.85–1.18)
LACTATE SERPL-SCNC: 1.4 MMOL/L — SIGNIFICANT CHANGE UP (ref 0.5–2)
LACTATE SERPL-SCNC: 1.4 MMOL/L — SIGNIFICANT CHANGE UP (ref 0.5–2)
LYMPHOCYTES # BLD AUTO: 1.52 K/UL — SIGNIFICANT CHANGE UP (ref 1–3.3)
LYMPHOCYTES # BLD AUTO: 1.52 K/UL — SIGNIFICANT CHANGE UP (ref 1–3.3)
LYMPHOCYTES # BLD AUTO: 16 % — SIGNIFICANT CHANGE UP (ref 13–44)
LYMPHOCYTES # BLD AUTO: 16 % — SIGNIFICANT CHANGE UP (ref 13–44)
MAGNESIUM SERPL-MCNC: 2 MG/DL — SIGNIFICANT CHANGE UP (ref 1.6–2.6)
MAGNESIUM SERPL-MCNC: 2 MG/DL — SIGNIFICANT CHANGE UP (ref 1.6–2.6)
MCHC RBC-ENTMCNC: 29.1 PG — SIGNIFICANT CHANGE UP (ref 27–34)
MCHC RBC-ENTMCNC: 29.1 PG — SIGNIFICANT CHANGE UP (ref 27–34)
MCHC RBC-ENTMCNC: 32.8 GM/DL — SIGNIFICANT CHANGE UP (ref 32–36)
MCHC RBC-ENTMCNC: 32.8 GM/DL — SIGNIFICANT CHANGE UP (ref 32–36)
MCV RBC AUTO: 88.7 FL — SIGNIFICANT CHANGE UP (ref 80–100)
MCV RBC AUTO: 88.7 FL — SIGNIFICANT CHANGE UP (ref 80–100)
MONOCYTES # BLD AUTO: 0.79 K/UL — SIGNIFICANT CHANGE UP (ref 0–0.9)
MONOCYTES # BLD AUTO: 0.79 K/UL — SIGNIFICANT CHANGE UP (ref 0–0.9)
MONOCYTES NFR BLD AUTO: 8.3 % — SIGNIFICANT CHANGE UP (ref 2–14)
MONOCYTES NFR BLD AUTO: 8.3 % — SIGNIFICANT CHANGE UP (ref 2–14)
NEUTROPHILS # BLD AUTO: 6.51 K/UL — SIGNIFICANT CHANGE UP (ref 1.8–7.4)
NEUTROPHILS # BLD AUTO: 6.51 K/UL — SIGNIFICANT CHANGE UP (ref 1.8–7.4)
NEUTROPHILS NFR BLD AUTO: 68.8 % — SIGNIFICANT CHANGE UP (ref 43–77)
NEUTROPHILS NFR BLD AUTO: 68.8 % — SIGNIFICANT CHANGE UP (ref 43–77)
NRBC # BLD: 0 /100 WBCS — SIGNIFICANT CHANGE UP (ref 0–0)
NRBC # BLD: 0 /100 WBCS — SIGNIFICANT CHANGE UP (ref 0–0)
PHOSPHATE SERPL-MCNC: 3.5 MG/DL — SIGNIFICANT CHANGE UP (ref 2.5–4.5)
PHOSPHATE SERPL-MCNC: 3.5 MG/DL — SIGNIFICANT CHANGE UP (ref 2.5–4.5)
PLATELET # BLD AUTO: 198 K/UL — SIGNIFICANT CHANGE UP (ref 150–400)
PLATELET # BLD AUTO: 198 K/UL — SIGNIFICANT CHANGE UP (ref 150–400)
POTASSIUM SERPL-MCNC: 5.1 MMOL/L — SIGNIFICANT CHANGE UP (ref 3.5–5.3)
POTASSIUM SERPL-MCNC: 5.1 MMOL/L — SIGNIFICANT CHANGE UP (ref 3.5–5.3)
POTASSIUM SERPL-SCNC: 5.1 MMOL/L — SIGNIFICANT CHANGE UP (ref 3.5–5.3)
POTASSIUM SERPL-SCNC: 5.1 MMOL/L — SIGNIFICANT CHANGE UP (ref 3.5–5.3)
PROT SERPL-MCNC: 7.6 G/DL — SIGNIFICANT CHANGE UP (ref 6–8.3)
PROT SERPL-MCNC: 7.6 G/DL — SIGNIFICANT CHANGE UP (ref 6–8.3)
PROTHROM AB SERPL-ACNC: 13.2 SEC — HIGH (ref 9.5–13)
PROTHROM AB SERPL-ACNC: 13.2 SEC — HIGH (ref 9.5–13)
RBC # BLD: 3.99 M/UL — LOW (ref 4.2–5.8)
RBC # BLD: 3.99 M/UL — LOW (ref 4.2–5.8)
RBC # FLD: 14.6 % — HIGH (ref 10.3–14.5)
RBC # FLD: 14.6 % — HIGH (ref 10.3–14.5)
RH IG SCN BLD-IMP: POSITIVE — SIGNIFICANT CHANGE UP
RH IG SCN BLD-IMP: POSITIVE — SIGNIFICANT CHANGE UP
SODIUM SERPL-SCNC: 129 MMOL/L — LOW (ref 135–145)
SODIUM SERPL-SCNC: 129 MMOL/L — LOW (ref 135–145)
UFH PPP CHRO-ACNC: 0.55 IU/ML — SIGNIFICANT CHANGE UP (ref 0.3–0.7)
UFH PPP CHRO-ACNC: 0.55 IU/ML — SIGNIFICANT CHANGE UP (ref 0.3–0.7)
VANCOMYCIN TROUGH SERPL-MCNC: 12.5 UG/ML — SIGNIFICANT CHANGE UP (ref 10–20)
VANCOMYCIN TROUGH SERPL-MCNC: 12.5 UG/ML — SIGNIFICANT CHANGE UP (ref 10–20)
WBC # BLD: 9.48 K/UL — SIGNIFICANT CHANGE UP (ref 3.8–10.5)
WBC # BLD: 9.48 K/UL — SIGNIFICANT CHANGE UP (ref 3.8–10.5)
WBC # FLD AUTO: 9.48 K/UL — SIGNIFICANT CHANGE UP (ref 3.8–10.5)
WBC # FLD AUTO: 9.48 K/UL — SIGNIFICANT CHANGE UP (ref 3.8–10.5)

## 2023-11-26 PROCEDURE — 99232 SBSQ HOSP IP/OBS MODERATE 35: CPT

## 2023-11-26 PROCEDURE — 93010 ELECTROCARDIOGRAM REPORT: CPT

## 2023-11-26 PROCEDURE — 71045 X-RAY EXAM CHEST 1 VIEW: CPT | Mod: 26

## 2023-11-26 PROCEDURE — 99292 CRITICAL CARE ADDL 30 MIN: CPT | Mod: 25

## 2023-11-26 PROCEDURE — 99291 CRITICAL CARE FIRST HOUR: CPT | Mod: 25

## 2023-11-26 RX ORDER — VANCOMYCIN HCL 1 G
1500 VIAL (EA) INTRAVENOUS ONCE
Refills: 0 | Status: COMPLETED | OUTPATIENT
Start: 2023-11-26 | End: 2023-11-26

## 2023-11-26 RX ORDER — VANCOMYCIN HCL 1 G
1500 VIAL (EA) INTRAVENOUS ONCE
Refills: 0 | Status: COMPLETED | OUTPATIENT
Start: 2023-11-27 | End: 2023-11-27

## 2023-11-26 RX ORDER — TETANUS TOXOID, REDUCED DIPHTHERIA TOXOID AND ACELLULAR PERTUSSIS VACCINE, ADSORBED 5; 2.5; 8; 8; 2.5 [IU]/.5ML; [IU]/.5ML; UG/.5ML; UG/.5ML; UG/.5ML
0.5 SUSPENSION INTRAMUSCULAR ONCE
Refills: 0 | Status: COMPLETED | OUTPATIENT
Start: 2023-11-26 | End: 2023-11-26

## 2023-11-26 RX ORDER — LACTULOSE 10 G/15ML
20 SOLUTION ORAL THREE TIMES A DAY
Refills: 0 | Status: DISCONTINUED | OUTPATIENT
Start: 2023-11-26 | End: 2023-11-27

## 2023-11-26 RX ORDER — ZOSTER VACCINE RECOMBINANT, ADJUVANTED 50 MCG/0.5
0.5 KIT INTRAMUSCULAR ONCE
Refills: 0 | Status: COMPLETED | OUTPATIENT
Start: 2023-11-26 | End: 2023-12-01

## 2023-11-26 RX ADMIN — ATORVASTATIN CALCIUM 80 MILLIGRAM(S): 80 TABLET, FILM COATED ORAL at 21:11

## 2023-11-26 RX ADMIN — SENNA PLUS 2 TABLET(S): 8.6 TABLET ORAL at 21:11

## 2023-11-26 RX ADMIN — ISOSORBIDE DINITRATE 40 MILLIGRAM(S): 5 TABLET ORAL at 12:12

## 2023-11-26 RX ADMIN — Medication 300 MILLIGRAM(S): at 05:48

## 2023-11-26 RX ADMIN — Medication 20 MICROGRAM(S): at 05:48

## 2023-11-26 RX ADMIN — LACTULOSE 20 GRAM(S): 10 SOLUTION ORAL at 21:10

## 2023-11-26 RX ADMIN — BUDESONIDE AND FORMOTEROL FUMARATE DIHYDRATE 2 PUFF(S): 160; 4.5 AEROSOL RESPIRATORY (INHALATION) at 12:27

## 2023-11-26 RX ADMIN — HEPARIN SODIUM 13 UNIT(S)/HR: 5000 INJECTION INTRAVENOUS; SUBCUTANEOUS at 05:14

## 2023-11-26 RX ADMIN — AMIODARONE HYDROCHLORIDE 200 MILLIGRAM(S): 400 TABLET ORAL at 05:12

## 2023-11-26 RX ADMIN — ISOSORBIDE DINITRATE 40 MILLIGRAM(S): 5 TABLET ORAL at 05:13

## 2023-11-26 RX ADMIN — Medication 8 UNIT(S): at 17:01

## 2023-11-26 RX ADMIN — TETANUS TOXOID, REDUCED DIPHTHERIA TOXOID AND ACELLULAR PERTUSSIS VACCINE, ADSORBED 0.5 MILLILITER(S): 5; 2.5; 8; 8; 2.5 SUSPENSION INTRAMUSCULAR at 22:33

## 2023-11-26 RX ADMIN — Medication 8 UNIT(S): at 08:41

## 2023-11-26 RX ADMIN — Medication 100 MILLIGRAM(S): at 14:00

## 2023-11-26 RX ADMIN — Medication 81 MILLIGRAM(S): at 12:12

## 2023-11-26 RX ADMIN — Medication 8 UNIT(S): at 14:01

## 2023-11-26 RX ADMIN — Medication 100 MILLIGRAM(S): at 06:39

## 2023-11-26 RX ADMIN — CARVEDILOL PHOSPHATE 25 MILLIGRAM(S): 80 CAPSULE, EXTENDED RELEASE ORAL at 05:12

## 2023-11-26 RX ADMIN — CARVEDILOL PHOSPHATE 25 MILLIGRAM(S): 80 CAPSULE, EXTENDED RELEASE ORAL at 18:06

## 2023-11-26 RX ADMIN — ISOSORBIDE DINITRATE 40 MILLIGRAM(S): 5 TABLET ORAL at 16:57

## 2023-11-26 RX ADMIN — Medication 2: at 17:01

## 2023-11-26 RX ADMIN — Medication 100 MILLIGRAM(S): at 21:11

## 2023-11-26 RX ADMIN — POLYETHYLENE GLYCOL 3350 17 GRAM(S): 17 POWDER, FOR SOLUTION ORAL at 05:42

## 2023-11-26 RX ADMIN — Medication 1: at 08:41

## 2023-11-26 RX ADMIN — INSULIN GLARGINE 12 UNIT(S): 100 INJECTION, SOLUTION SUBCUTANEOUS at 22:14

## 2023-11-26 NOTE — PROGRESS NOTE ADULT - SUBJECTIVE AND OBJECTIVE BOX
DEVORAH VALENCIA  MRN-[id number]  Patient is a 58y old  Male who presents with a chief complaint of CP/SOB (2023 06:31)    HPI:  pt seen and approx 1:30 pm  in [facility]    57yo M w/ hx HTN, CAD w/ 1 stent in , ICH () presenting with abn ekg. Patient presented to [facility] where he was found to have STEMI, recommended to get cath however patient did not want to get it there so it left and came here.  Patient initially had cough, congestion, fever, was placed on antibiotics on .  Started feeling nauseous and had a presyncopal event after which he presented to ED last night.  Had chest pain as well.  Chest pain is midsternal.  Not currently having chest pain.  Received 4 aspirin 30 min pta. (2023 15:11)      Hospital Course:    24 HOUR EVENTS:    REVIEW OF SYSTEMS:    CONSTITUTIONAL: No weakness, fevers or chills  EYES/ENT: No visual changes;  No vertigo or throat pain   NECK: No pain or stiffness  RESPIRATORY: No cough, wheezing, hemoptysis; No shortness of breath  CARDIOVASCULAR: No chest pain or palpitations  GASTROINTESTINAL: No abdominal or epigastric pain. No nausea, vomiting, or hematemesis; No diarrhea or constipation. No melena or hematochezia.  GENITOURINARY: No dysuria, frequency or hematuria  NEUROLOGICAL: No numbness or weakness  SKIN: No itching, rashes      ICU Vital Signs Last 24 Hrs  T(C): 36.5 (2023 17:00), Max: 36.7 (2023 23:00)  T(F): 97.7 (2023 17:00), Max: 98.1 (2023 03:00)  HR: 77 (2023 19:00) (66 - 84)  BP: --  BP(mean): --  ABP: --  ABP(mean): --  RR: 19 (2023 19:00) (16 - 25)  SpO2: 98% (2023 19:00) (97% - 100%)    O2 Parameters below as of 2023 19:00  Patient On (Oxygen Delivery Method): room air            CVP(mm Hg): 3 (-23 @ 18:00) (0 - 16)  CO: --  CI: --  PA: 24/10 (23 @ 18:00) (24/10 - 37/12)  PA(mean): 16 (23 @ 18:00) (4 - 23)  PA(direct): --  PCWP: --  LA: --  RA: --  SVR: --  SVRI: --  PVR: --  PVRI: --  I&O's Summary    2023 07:  -  2023 07:00  --------------------------------------------------------  IN: 1759 mL / OUT: 2350 mL / NET: -591 mL    2023 07:01  -  2023 19:51  --------------------------------------------------------  IN: 769 mL / OUT: 800 mL / NET: -31 mL        CAPILLARY BLOOD GLUCOSE    CAPILLARY BLOOD GLUCOSE      POCT Blood Glucose.: 222 mg/dL (2023 16:52)      PHYSICAL EXAM:  GENERAL: No acute distress, well-developed  HEAD:  Atraumatic, Normocephalic  EYES: EOMI, PERRLA, conjunctiva and sclera clear  NECK: Supple, no lymphadenopathy, no JVD  CHEST/LUNG: CTAB; No wheezes, rales, or rhonchi  HEART: Regular rate and rhythm. Normal S1/S2. No murmurs, rubs, or gallops  ABDOMEN: Soft, non-tender, non-distended; normal bowel sounds, no organomegaly  EXTREMITIES:  2+ peripheral pulses b/l, No clubbing, cyanosis, or edema  NEUROLOGY: A&O x 3, no focal deficits  SKIN: No rashes or lesions    ============================I/O===========================   I&O's Detail    2023 07:01  -  2023 07:00  --------------------------------------------------------  IN:    Heparin: 299 mL    IV PiggyBack: 500 mL    Oral Fluid: 960 mL  Total IN: 1759 mL    OUT:    Voided (mL): 2350 mL  Total OUT: 2350 mL    Total NET: -591 mL      2023 07:01  -  2023 19:51  --------------------------------------------------------  IN:    Heparin: 169 mL    Oral Fluid: 600 mL  Total IN: 769 mL    OUT:    Voided (mL): 800 mL  Total OUT: 800 mL    Total NET: -31 mL        ============================ LABS =========================                        11.6   9.48  )-----------( 198      ( 2023 04:30 )             35.4     -    129<L>  |  97  |  37<H>  ----------------------------<  148<H>  5.1   |  21<L>  |  1.56<H>    Ca    10.0      2023 04:30  Phos  3.5       Mg     2.0         TPro  7.6  /  Alb  3.9  /  TBili  0.4  /  DBili  x   /  AST  25  /  ALT  45  /  AlkPhos  79                  LIVER FUNCTIONS - ( 2023 04:30 )  Alb: 3.9 g/dL / Pro: 7.6 g/dL / ALK PHOS: 79 U/L / ALT: 45 U/L / AST: 25 U/L / GGT: x           PT/INR - ( 2023 04:30 )   PT: 13.2 sec;   INR: 1.21 ratio         PTT - ( 2023 04:30 )  PTT:72.0 sec    Lactate, Blood: 1.4 mmol/L (23 @ 04:30)  Blood Gas Venous - Lactate: 1.4 mmol/L (23 @ 16:07)    Urinalysis Basic - ( 2023 04:30 )    Color: x / Appearance: x / SG: x / pH: x  Gluc: 148 mg/dL / Ketone: x  / Bili: x / Urobili: x   Blood: x / Protein: x / Nitrite: x   Leuk Esterase: x / RBC: x / WBC x   Sq Epi: x / Non Sq Epi: x / Bacteria: x      ======================Micro/Rad/Cardio=================  Telemtry: Reviewed   EKG: Reviewed  CXR: Reviewed  Culture: Reviewed   Echo:   Cath: Cardiac Cath Lab - Adult:   [facility]  Department of Cardiology  [address] [place] [address]  [phone]  Cath Lab Report -- Comprehensive Report  Patient: LD VALENCIA  Study date: 2017  Account number: [id number]  MR number: [id number]  : 1965  Gender: Male  Race: W  Case Physician(s):  Jairon Holguin M.D.  Referring Physician:  Luc Lynn M.D.  INDICATIONS: Unstable angina - CCS4.  HISTORY: The patient has a history of coronary artery disease. The patient  hashypertension and medication-treated dyslipidemia.  PROCEDURE:  --  Left heart catheterization with ventriculography.  --  Left coronary angiography.  --  Right coronary angiography.  TECHNIQUE: The risks and alternatives of the procedures and conscious  sedation were explained to the patient and informed consent was obtained.  Cardiac catheterization performed electively.  Local anesthetic given. Right radial artery access. A 6FR PRELUDE KIT was  inserted in the vessel. Left heart catheterization. Ventriculography was  performed. A 5FR FR4.0 EXPO catheter was utilized. Left coronary artery  angiography. The vessel was injected utilizing a 5FR FL3.5 EXPO catheter.  Right coronary artery angiography. The vessel was injected utilizing a 5FR  FR4.0 EXPO catheter. RADIATION EXPOSURE: 1.1 min.  CONTRAST GIVEN: Omnipaque 55 ml.  MEDICATIONS GIVEN: Midazolam, 1 mg, IV. Fentanyl, 25 mcg, IV. Verapamil  (Isoptin, Calan, Covera), 2.5 mg, IA. Heparin, 3000 units, IA.  VENTRICLES: Global left ventricular function was moderately depressed. EF  estimated was 40 %.  CORONARY VESSELS: The coronary circulation is right dominant.  LM:   --  LM: Normal.  LAD:   --  Proximal LAD: There was a 50 % stenosis.  CX:   --  Circumflex: Normal.  RCA:   --  Mid RCA: There was a 40 % stenosis.  --  Distal RCA: There was a 50 % stenosis.  COMPLICATIONS: There were no complications.  DIAGNOSTIC RECOMMENDATIONS: The patient should continue with the present  medications.  Prepared and signed by  Jairon Holguin M.D.  Signed 2017 12:20:13  HEMODYNAMIC TABLES  Pressures:  Baseline/ Room Air  Pressures:  - HR: 78  Pressures:  - Rhythm:  Pressures:  -- Aortic Pressure (S/D/M): --/--/99  Pressures:  -- Left Ventricle (s/edp): 157/39/--  Outputs:  Baseline/ Room Air  Outputs:  -- CALCULATIONS[age]  Outputs:  -- CALCULATIONS: Body Surface Area: 2.05  Outputs:  -- CALCULATIONS: Height in cm: 175.00  Outputs:  -- CALCULATIONS: Sex: Male  Outputs:  -- CALCULATIONS: Weight in k.40 (17 @ 21:55)    ======================================================  PAST MEDICAL & SURGICAL HISTORY:  HTN (hypertension)      CAD (coronary artery disease)  ; stent      Intracranial hemorrhage        Respiratory arrest        Myocardial infarction, unspecified MI type, unspecified artery      History of coronary artery stent placement  ====================ASSESSMENT ==============  58 male with HTN, CAD (s/p PCI ), HFrEF, CVA , and T2DM presenting with chest pressure and unknown tachycardia that was shocked x1, [facility]  found to have in-stent restenosis of pLAD and  of RCA with elevated RA and PA pressures and severely decreased EF 32%. Admitted to CICU for management of cardiogenic shock and ADHF requiring IABP  -, with hospital course c/b vfib arrest requiring reinsertion of IABP. Currently undergoing workup for AT evaluation with HF.    Plan:  ====================== NEUROLOGY=====================  # Anxiety  - No Seroquel/antipsychotics since vfib arrest and prolonged QTC  - Psych Eval, recommended SSRI, but pt. refused   - Psych recommending atarax PRN  - Continue to monitor mental status    # PT/Conditioning  - Continue band exercised while on bedrest s/t IABP, IS    ==================== RESPIRATORY======================  # Acute Hypoxemic Respiratory Failure  - s/p x2 intubations for cardiogenic pulm edema and the in setting of cardiac arrest, resolved - extubated 11/10  - Currently on room air with spO2 mid 90s  - Continue incentive spirometry and monitoring of sp02    # Asthma  - c/w albuterol, symbicort and spiriva  - On trelegy at home    ====================CARDIOVASCULAR====================  # Vfib arrest iso ischemia  - Lido gtt off   - PO Amio load - total of 5g per EP complete , continue PO amio  - Keep K > 4, Mag > 2.2     # Cardiogenic shock requiring IABP (- , -)  - Likely 2/2 NSTEMI and ADHF  -  [facility]: pLAD 100 % in-stent restenosis & mRCA, 100 %. PCWP 30. IABP placed.  -  TTE: LV dilated. EF 32 %. Regional WMAs present, mod (grade 2) LV diastolic dysfunction  -  TTE: EF 22% and + LV thrombus  - PRN Bumex IVP as needed  - IABP swapped  to RFA, continue 1:1 support  - Off Milrinone gtt @ 7:30 am   - Currently on hydralazine 100 TID and ISD 40 TID for AL reduction  - James d/c'd due to elevated K levels  - c/w coreg 25 BID for GDMT  - Listed OHT status 2 , f/u HF recs    # NSTEMI iso stent re-occlusion of pLAD and 100%  of RCA  - EKG on admission w/LBBB  - DAPT: c/w ASA, Brilinta d/c'd per transplant w/u  - c/w lipitor 80  - cMR deferred given necessity of IABP  - CT sx not recommending CABG, undergoing AT eval    # LV thrombus  - c/w heparin gtt    ===================== RENAL =========================  # Non-oliguric ARIC   - sCr stable, Baseline Cr: 1-.  - Renal US - no evidence of renal artery stenosis  - Trend BMP, lytes daily, replace as needed  - Continue Strict I/Os, avoid nephrotoxins    # Mild Hyponatremia/Hyperkalemia  - iso ARIC and or new CKD baseline  - s/p hypertonic saline bolus per nephro request  - Assess volume status and consider diuresis if hypervolemic   - 1.2L Fluid restriction, gtts in NS where applicable   - Low K diet       ==================== GASTROINTESTINAL===================  - c/w diet  - continue to monitor for BM  - c/w bowel regimen    ===================HEMATOLOGIC/ONC ===================  # VTE prophylaxis   - c/w heparin gtt given LV Thrombus and IABP  - H/H and platelets stable    =======================    ENDOCRINE  =====================  # Type 2 DM  - A1c 8.3, glucose   - Continue lantus, premeal, low ISS    ========================INFECTIOUS DISEASE================  # Enterococcus faecalis bacteremia  - BCx + for enterococcus faecalis x2, Staph epi x1 (likely contaminant)- pan sensitive   - Urine cx  + enterococcus faecalis  - BCx  NGTD  - IABP site swapped to RFA   - continue vancomycin 1g q12h (- stop  per ID)  - CT A/P negative for infectious pathology    # COVID, resolved  - Off airborne precautions     # Pre-transplant ID w/u   - Trend ID recs for serologies   - Colonoscopy  - normal   - Chest CT  - improved LLL aeration  - Pt. requiring vaccines prior to transplant, would initiate series accordingly  - Recieved hep A and hep B vaccines thus far      Patient requires continuous monitoring with bedside rhythm monitoring, pulse ox monitoring, and intermittent blood gas analysis. Care plan discussed with ICU care team. Patient remained critical and at risk for life threatening decompensation.  Patient seen, examined and plan discussed with CCU team during rounds.     I have personally provided ____ minutes of critical care time excluding time spent on separate procedures, in addition to initial critical care time provided by the CICU Attending, Dr. Lees.    By signing my name below, I, Kalyn Sousa, attest that this documentation has been prepared under the direction and in the presence of Kirsty Davis NP.  Electronically signed: Rosalba Booth, 23 @ 19:51    I, Kirsty Davis , personally performed the services described in this documentation. all medical record entries made by the eileenibmartha were at my direction and in my presence. I have reviewed the chart and agree that the record reflects my personal performance and is accurate and complete  Electronically signed: Kirsty Davis NP.       DEVORAH VALENCIA  MRN-[id number]  Patient is a 58y old  Male who presents with a chief complaint of CP/SOB (2023 06:31)    HPI:  pt seen and approx 1:30 pm  in [facility]    57yo M w/ hx HTN, CAD w/ 1 stent in , ICH () presenting with abn ekg. Patient presented to [facility] where he was found to have STEMI, recommended to get cath however patient did not want to get it there so it left and came here.  Patient initially had cough, congestion, fever, was placed on antibiotics on .  Started feeling nauseous and had a presyncopal event after which he presented to ED last night.  Had chest pain as well.  Chest pain is midsternal.  Not currently having chest pain.  Received 4 aspirin 30 min pta. (2023 15:11)      Hospital Course:    24 HOUR EVENTS:    REVIEW OF SYSTEMS:    CONSTITUTIONAL: No weakness, fevers or chills  EYES/ENT: No visual changes;  No vertigo or throat pain   NECK: No pain or stiffness  RESPIRATORY: No cough, wheezing, hemoptysis; No shortness of breath  CARDIOVASCULAR: No chest pain or palpitations  GASTROINTESTINAL: No abdominal or epigastric pain. No nausea, vomiting, or hematemesis; No diarrhea or constipation. No melena or hematochezia.  GENITOURINARY: No dysuria, frequency or hematuria  NEUROLOGICAL: No numbness or weakness  SKIN: No itching, rashes      ICU Vital Signs Last 24 Hrs  T(C): 36.5 (2023 17:00), Max: 36.7 (2023 23:00)  T(F): 97.7 (2023 17:00), Max: 98.1 (2023 03:00)  HR: 77 (2023 19:00) (66 - 84)  BP: --  BP(mean): --  ABP: --  ABP(mean): --  RR: 19 (2023 19:00) (16 - 25)  SpO2: 98% (2023 19:00) (97% - 100%)    O2 Parameters below as of 2023 19:00  Patient On (Oxygen Delivery Method): room air            CVP(mm Hg): 3 (-23 @ 18:00) (0 - 16)  CO: --  CI: --  PA: 24/10 (23 @ 18:00) (24/10 - 37/12)  PA(mean): 16 (23 @ 18:00) (4 - 23)  PA(direct): --  PCWP: --  LA: --  RA: --  SVR: --  SVRI: --  PVR: --  PVRI: --  I&O's Summary    2023 07:  -  2023 07:00  --------------------------------------------------------  IN: 1759 mL / OUT: 2350 mL / NET: -591 mL    2023 07:01  -  2023 19:51  --------------------------------------------------------  IN: 769 mL / OUT: 800 mL / NET: -31 mL        CAPILLARY BLOOD GLUCOSE    CAPILLARY BLOOD GLUCOSE      POCT Blood Glucose.: 222 mg/dL (2023 16:52)      PHYSICAL EXAM:  GENERAL: No acute distress, well-developed  HEAD:  Atraumatic, Normocephalic  EYES: EOMI, PERRLA, conjunctiva and sclera clear  NECK: Supple, no lymphadenopathy, no JVD  CHEST/LUNG: CTAB; No wheezes, rales, or rhonchi  HEART: Regular rate and rhythm. Normal S1/S2. No murmurs, rubs, or gallops  ABDOMEN: Soft, non-tender, non-distended; normal bowel sounds, no organomegaly  EXTREMITIES:  2+ peripheral pulses b/l, No clubbing, cyanosis, or edema  NEUROLOGY: A&O x 3, no focal deficits  SKIN: No rashes or lesions    ============================I/O===========================   I&O's Detail    2023 07:01  -  2023 07:00  --------------------------------------------------------  IN:    Heparin: 299 mL    IV PiggyBack: 500 mL    Oral Fluid: 960 mL  Total IN: 1759 mL    OUT:    Voided (mL): 2350 mL  Total OUT: 2350 mL    Total NET: -591 mL      2023 07:01  -  2023 19:51  --------------------------------------------------------  IN:    Heparin: 169 mL    Oral Fluid: 600 mL  Total IN: 769 mL    OUT:    Voided (mL): 800 mL  Total OUT: 800 mL    Total NET: -31 mL        ============================ LABS =========================                        11.6   9.48  )-----------( 198      ( 2023 04:30 )             35.4     -    129<L>  |  97  |  37<H>  ----------------------------<  148<H>  5.1   |  21<L>  |  1.56<H>    Ca    10.0      2023 04:30  Phos  3.5       Mg     2.0         TPro  7.6  /  Alb  3.9  /  TBili  0.4  /  DBili  x   /  AST  25  /  ALT  45  /  AlkPhos  79                  LIVER FUNCTIONS - ( 2023 04:30 )  Alb: 3.9 g/dL / Pro: 7.6 g/dL / ALK PHOS: 79 U/L / ALT: 45 U/L / AST: 25 U/L / GGT: x           PT/INR - ( 2023 04:30 )   PT: 13.2 sec;   INR: 1.21 ratio         PTT - ( 2023 04:30 )  PTT:72.0 sec    Lactate, Blood: 1.4 mmol/L (23 @ 04:30)  Blood Gas Venous - Lactate: 1.4 mmol/L (23 @ 16:07)    Urinalysis Basic - ( 2023 04:30 )    Color: x / Appearance: x / SG: x / pH: x  Gluc: 148 mg/dL / Ketone: x  / Bili: x / Urobili: x   Blood: x / Protein: x / Nitrite: x   Leuk Esterase: x / RBC: x / WBC x   Sq Epi: x / Non Sq Epi: x / Bacteria: x      ======================Micro/Rad/Cardio=================  Telemtry: Reviewed   EKG: Reviewed  CXR: Reviewed  Culture: Reviewed   Echo:   Cath: Cardiac Cath Lab - Adult:   [facility]  Department of Cardiology  [address] [place] [address]  [phone]  Cath Lab Report -- Comprehensive Report  Patient: LD VALENCIA  Study date: 2017  Account number: [id number]  MR number: [id number]  : 1965  Gender: Male  Race: W  Case Physician(s):  Jairon Holguin M.D.  Referring Physician:  Luc Lynn M.D.  INDICATIONS: Unstable angina - CCS4.  HISTORY: The patient has a history of coronary artery disease. The patient  hashypertension and medication-treated dyslipidemia.  PROCEDURE:  --  Left heart catheterization with ventriculography.  --  Left coronary angiography.  --  Right coronary angiography.  TECHNIQUE: The risks and alternatives of the procedures and conscious  sedation were explained to the patient and informed consent was obtained.  Cardiac catheterization performed electively.  Local anesthetic given. Right radial artery access. A 6FR PRELUDE KIT was  inserted in the vessel. Left heart catheterization. Ventriculography was  performed. A 5FR FR4.0 EXPO catheter was utilized. Left coronary artery  angiography. The vessel was injected utilizing a 5FR FL3.5 EXPO catheter.  Right coronary artery angiography. The vessel was injected utilizing a 5FR  FR4.0 EXPO catheter. RADIATION EXPOSURE: 1.1 min.  CONTRAST GIVEN: Omnipaque 55 ml.  MEDICATIONS GIVEN: Midazolam, 1 mg, IV. Fentanyl, 25 mcg, IV. Verapamil  (Isoptin, Calan, Covera), 2.5 mg, IA. Heparin, 3000 units, IA.  VENTRICLES: Global left ventricular function was moderately depressed. EF  estimated was 40 %.  CORONARY VESSELS: The coronary circulation is right dominant.  LM:   --  LM: Normal.  LAD:   --  Proximal LAD: There was a 50 % stenosis.  CX:   --  Circumflex: Normal.  RCA:   --  Mid RCA: There was a 40 % stenosis.  --  Distal RCA: There was a 50 % stenosis.  COMPLICATIONS: There were no complications.  DIAGNOSTIC RECOMMENDATIONS: The patient should continue with the present  medications.  Prepared and signed by  Jairon Holguin M.D.  Signed 2017 12:20:13  HEMODYNAMIC TABLES  Pressures:  Baseline/ Room Air  Pressures:  - HR: 78  Pressures:  - Rhythm:  Pressures:  -- Aortic Pressure (S/D/M): --/--/99  Pressures:  -- Left Ventricle (s/edp): 157/39/--  Outputs:  Baseline/ Room Air  Outputs:  -- CALCULATIONS[age]  Outputs:  -- CALCULATIONS: Body Surface Area: 2.05  Outputs:  -- CALCULATIONS: Height in cm: 175.00  Outputs:  -- CALCULATIONS: Sex: Male  Outputs:  -- CALCULATIONS: Weight in k.40 (17 @ 21:55)    ======================================================  PAST MEDICAL & SURGICAL HISTORY:  HTN (hypertension)      CAD (coronary artery disease)  ; stent      Intracranial hemorrhage        Respiratory arrest        Myocardial infarction, unspecified MI type, unspecified artery      History of coronary artery stent placement  ====================ASSESSMENT ==============  58 male with HTN, CAD (s/p PCI ), HFrEF, CVA , and T2DM presenting with chest pressure and unknown tachycardia that was shocked x1, [facility]  found to have in-stent restenosis of pLAD and  of RCA with elevated RA and PA pressures and severely decreased EF 32%. Admitted to CICU for management of cardiogenic shock and ADHF requiring IABP  -, with hospital course c/b vfib arrest requiring reinsertion of IABP. Currently undergoing workup for AT evaluation with HF.    Plan:  ====================== NEUROLOGY=====================  # Anxiety  - No Seroquel/antipsychotics since vfib arrest and prolonged QTC  - Psych Eval, recommended SSRI, but pt. refused   - Psych recommending atarax PRN  - Continue to monitor mental status    # PT/Conditioning  - Continue band exercised while on bedrest s/t IABP, IS    ==================== RESPIRATORY======================  # Acute Hypoxemic Respiratory Failure  - s/p x2 intubations for cardiogenic pulm edema and the in setting of cardiac arrest, resolved - extubated 11/10  - Currently on room air with spO2 mid 90s  - Continue incentive spirometry and monitoring of sp02    # Asthma  - c/w albuterol, symbicort and spiriva  - On trelegy at home    ====================CARDIOVASCULAR====================  # Vfib arrest iso ischemia  - Lido gtt off   - PO Amio load - total of 5g per EP complete , continue PO amio  - Keep K > 4, Mag > 2.2     # Cardiogenic shock requiring IABP (- , -)  - Likely 2/2 NSTEMI and ADHF  -  [facility]: pLAD 100 % in-stent restenosis & mRCA, 100 %. PCWP 30. IABP placed.  -  TTE: LV dilated. EF 32 %. Regional WMAs present, mod (grade 2) LV diastolic dysfunction  -  TTE: EF 22% and + LV thrombus  - PRN Bumex IVP as needed  - IABP swapped  to RFA, continue 1:1 support  - Off Milrinone gtt @ 7:30 am   - Currently on hydralazine 100 TID and ISD 40 TID for AL reduction  - James d/c'd due to elevated K levels  - c/w coreg 25 BID for GDMT  - Listed OHT status 2 , f/u HF recs    # NSTEMI iso stent re-occlusion of pLAD and 100%  of RCA  - EKG on admission w/LBBB  - DAPT: c/w ASA, Brilinta d/c'd per transplant w/u  - c/w lipitor 80  - cMR deferred given necessity of IABP  - CT sx not recommending CABG, undergoing AT eval    # LV thrombus  - c/w heparin gtt    ===================== RENAL =========================  # Non-oliguric ARIC   - sCr stable, Baseline Cr: 1-.  - Renal US - no evidence of renal artery stenosis  - Trend BMP, lytes daily, replace as needed  - Continue Strict I/Os, avoid nephrotoxins    # Mild Hyponatremia/Hyperkalemia  - iso ARIC and or new CKD baseline  - s/p hypertonic saline bolus per nephro request  - Assess volume status and consider diuresis if hypervolemic   - 1.2L Fluid restriction, gtts in NS where applicable   - Low K diet       ==================== GASTROINTESTINAL===================  - c/w diet  - continue to monitor for BM  - c/w bowel regimen    ===================HEMATOLOGIC/ONC ===================  # VTE prophylaxis   - c/w heparin gtt given LV Thrombus and IABP  - H/H and platelets stable    =======================    ENDOCRINE  =====================  # Type 2 DM  - A1c 8.3, glucose   - Continue lantus, premeal, low ISS    ========================INFECTIOUS DISEASE================  # Enterococcus faecalis bacteremia  - BCx + for enterococcus faecalis x2, Staph epi x1 (likely contaminant)- pan sensitive   - Urine cx  + enterococcus faecalis  - BCx  NGTD  - IABP site swapped to RFA   - continue vancomycin 1g q12h (- stop  per ID)  - CT A/P negative for infectious pathology    # COVID, resolved  - Off airborne precautions     # Pre-transplant ID w/u   - Trend ID recs for serologies   - Colonoscopy  - normal   - Chest CT  - improved LLL aeration  - Pt. requiring vaccines prior to transplant, would initiate series accordingly  - Recieved hep A and hep B vaccines thus far      Patient requires continuous monitoring with bedside rhythm monitoring, pulse ox monitoring, and intermittent blood gas analysis. Care plan discussed with ICU care team. Patient remained critical and at risk for life threatening decompensation.  Patient seen, examined and plan discussed with CCU team during rounds.     I have personally provided __30__ minutes of critical care time excluding time spent on separate procedures, in addition to initial critical care time provided by the CICU Attending, Dr. Lees.    By signing my name below, I, Kalyn Sousa, attest that this documentation has been prepared under the direction and in the presence of Kirsty Davis NP.  Electronically signed: Rosalba Booth, 23 @ 19:51    I, Kirsty Davis , personally performed the services described in this documentation. all medical record entries made by the eileenibmartha were at my direction and in my presence. I have reviewed the chart and agree that the record reflects my personal performance and is accurate and complete  Electronically signed: Kirsty Davis NP.

## 2023-11-26 NOTE — PROGRESS NOTE ADULT - ASSESSMENT
====================ASSESSMENT ==============  58 male with HTN, CAD (s/p PCI 2008), HFrEF, CVA 2018, and T2DM presenting with chest pressure and unknown tachycardia that was shocked x1, [facility] 11/1 found to have in-stent restenosis of pLAD and  of RCA with elevated RA and PA pressures and severely decreased EF 32%. Admitted to CICU for management of cardiogenic shock and ADHF requiring IABP 11/1 -11/7, with hospital course c/b vfib arrest requiring reinsertion of IABP. Currently undergoing workup for AT evaluation with HF.    Plan:  ====================== NEUROLOGY=====================  # Anxiety  - No Seroquel/antipsychotics since vfib arrest and prolonged QTC  - Psych Eval, recommended SSRI, but pt. refused   - Psych recommending atarax PRN  - Continue to monitor mental status    # PT/Conditioning  - Continue band exercised while on bedrest s/t IABP, IS    ==================== RESPIRATORY======================  # Acute Hypoxemic Respiratory Failure  - s/p x2 intubations for cardiogenic pulm edema and the in setting of cardiac arrest, resolved - extubated 11/10  - Currently on room air with spO2 mid 90s  - Continue incentive spirometry and monitoring of sp02    # Asthma  - c/w albuterol, symbicort and spiriva  - On trelegy at home    ====================CARDIOVASCULAR====================  # Vfib arrest iso ischemia  - Lido gtt off 11/13  - PO Amio load - total of 5g per EP complete 11/17, continue PO amio  - Keep K > 4, Mag > 2.2     # Cardiogenic shock requiring IABP (11/1- 11/7, 11/9-)  - Likely 2/2 NSTEMI and ADHF  - 11/1 LHC: pLAD 100 % in-stent restenosis & mRCA, 100 %. PCWP 30. IABP placed.  - 11/1 TTE: LV dilated. EF 32 %. Regional WMAs present, mod (grade 2) LV diastolic dysfunction  - 11/2 TTE: EF 22% and + LV thrombus  - PRN Bumex IVP as needed  - IABP swapped 11/20 to RFA, continue 1:1 support  - Off Milrinone gtt @ 7:30 am 11/11  - Currently on hydralazine 100 TID and ISD 40 TID for AL reduction  - Continue romel 25 daily and coreg 25 BID for GDMT  - Listed OHT status 2 11/22, f/u HF recs    # NSTEMI iso stent re-occlusion of pLAD and 100%  of RCA  - EKG on admission w/LBBB  - DAPT: c/w ASA, Brilinta d/c'd per transplant w/u  - c/w lipitor 80  - cMR deferred given necessity of IABP  - CT sx not recommending CABG, undergoing AT eval    # LV thrombus  - c/w heparin gtt    ===================== RENAL =========================  # Non-oliguric ARIC   - sCr stable, Baseline Cr: 1-1.22  - Renal US - no evidence of renal artery stenosis  - Trend BMP, lytes daily, replace as needed  - Continue Strict I/Os, avoid nephrotoxins    # Mild Hyponatremia/Hyperkalemia  - iso ARIC and or new CKD baseline  - s/p hypertonic saline bolus per nephro request  - Assess volume status and consider diuresis if hypervolemic   - 1.2L Fluid restriction, gtts in NS where applicable   - Low K diet       ==================== GASTROINTESTINAL===================  - c/w diet  - continue to monitor for BM  - c/w bowel regimen    ===================HEMATOLOGIC/ONC ===================  # VTE prophylaxis   - c/w heparin gtt given LV Thrombus and IABP  - H/H and platelets stable    =======================    ENDOCRINE  =====================  # Type 2 DM  - A1c 8.3, glucose   - Continue lantus, premeal, low ISS    ========================INFECTIOUS DISEASE================  # Enterococcus faecalis bacteremia  - BCx 11/17+ for enterococcus faecalis x2, Staph epi x1 (likely contaminant)- pan sensitive   - Urine cx 11/18 + enterococcus faecalis  - BCx 11/18 NGTD  - IABP site swapped to RFA 11/20  - continue vancomycin 1g q12h (11/18- stop 11/27 per ID)  - CT A/P negative for infectious pathology    # COVID, resolved  - Off airborne precautions 11/11    # Pre-transplant ID w/u   - Trend ID recs for serologies   - Colonoscopy 11/17 - normal   - Chest CT 11/17 - improved LLL aeration  - Pt. requiring vaccines prior to transplant, would initiate series accordingly  - Recieved hep A and hep B vaccines thus far      Patient requires continuous monitoring with bedside rhythm monitoring, pulse ox monitoring, and intermittent blood gas analysis. Care plan discussed with ICU care team. Patient remained critical and at risk for life threatening decompensation.  Patient seen, examined and plan discussed with CCU team during rounds.     I have personally provided _30__ minutes of critical care time excluding time spent on separate procedures, in addition to initial critical care time provided by the CICU Attending, Dr. Lees.    Rita Henry, [facility] ====================ASSESSMENT ==============  58 male with HTN, CAD (s/p PCI 2008), HFrEF, CVA 2018, and T2DM presenting with chest pressure and unknown tachycardia that was shocked x1, [facility] 11/1 found to have in-stent restenosis of pLAD and  of RCA with elevated RA and PA pressures and severely decreased EF 32%. Admitted to CICU for management of cardiogenic shock and ADHF requiring IABP 11/1 -11/7, with hospital course c/b vfib arrest requiring reinsertion of IABP. Currently undergoing workup for AT evaluation with HF.    Plan:  ====================== NEUROLOGY=====================  # Anxiety  - No Seroquel/antipsychotics since vfib arrest and prolonged QTC  - Psych Eval, recommended SSRI, but pt. refused   - Psych recommending atarax PRN  - Continue to monitor mental status    # PT/Conditioning  - Continue band exercised while on bedrest s/t IABP, IS    ==================== RESPIRATORY======================  # Acute Hypoxemic Respiratory Failure  - s/p x2 intubations for cardiogenic pulm edema and the in setting of cardiac arrest, resolved - extubated 11/10  - Currently on room air with spO2 mid 90s  - Continue incentive spirometry and monitoring of sp02    # Asthma  - c/w albuterol, symbicort and spiriva  - On trelegy at home    ====================CARDIOVASCULAR====================  # Vfib arrest iso ischemia  - Lido gtt off 11/13  - PO Amio load - total of 5g per EP complete 11/17, continue PO amio  - Keep K > 4, Mag > 2.2     # Cardiogenic shock requiring IABP (11/1- 11/7, 11/9-)  - Likely 2/2 NSTEMI and ADHF  - 11/1 LHC: pLAD 100 % in-stent restenosis & mRCA, 100 %. PCWP 30. IABP placed.  - 11/1 TTE: LV dilated. EF 32 %. Regional WMAs present, mod (grade 2) LV diastolic dysfunction  - 11/2 TTE: EF 22% and + LV thrombus  - PRN Bumex IVP as needed  - IABP swapped 11/20 to RFA, continue 1:1 support  - Off Milrinone gtt @ 7:30 am 11/11  - Currently on hydralazine 100 TID and ISD 40 TID for AL reduction  - Trout d/c'd due to elevated K levels  - c/w coreg 25 BID for GDMT  - Listed OHT status 2 11/22, f/u HF recs    # NSTEMI iso stent re-occlusion of pLAD and 100%  of RCA  - EKG on admission w/LBBB  - DAPT: c/w ASA, Brilinta d/c'd per transplant w/u  - c/w lipitor 80  - cMR deferred given necessity of IABP  - CT sx not recommending CABG, undergoing AT eval    # LV thrombus  - c/w heparin gtt    ===================== RENAL =========================  # Non-oliguric ARIC   - sCr stable, Baseline Cr: 1-1.22  - Renal US - no evidence of renal artery stenosis  - Trend BMP, lytes daily, replace as needed  - Continue Strict I/Os, avoid nephrotoxins    # Mild Hyponatremia/Hyperkalemia  - iso ARIC and or new CKD baseline  - s/p hypertonic saline bolus per nephro request  - Assess volume status and consider diuresis if hypervolemic   - 1.2L Fluid restriction, gtts in NS where applicable   - Low K diet       ==================== GASTROINTESTINAL===================  - c/w diet  - continue to monitor for BM  - c/w bowel regimen    ===================HEMATOLOGIC/ONC ===================  # VTE prophylaxis   - c/w heparin gtt given LV Thrombus and IABP  - H/H and platelets stable    =======================    ENDOCRINE  =====================  # Type 2 DM  - A1c 8.3, glucose   - Continue lantus, premeal, low ISS    ========================INFECTIOUS DISEASE================  # Enterococcus faecalis bacteremia  - BCx 11/17+ for enterococcus faecalis x2, Staph epi x1 (likely contaminant)- pan sensitive   - Urine cx 11/18 + enterococcus faecalis  - BCx 11/18 NGTD  - IABP site swapped to RFA 11/20  - continue vancomycin 1g q12h (11/18- stop 11/27 per ID)  - CT A/P negative for infectious pathology    # COVID, resolved  - Off airborne precautions 11/11    # Pre-transplant ID w/u   - Trend ID recs for serologies   - Colonoscopy 11/17 - normal   - Chest CT 11/17 - improved LLL aeration  - Pt. requiring vaccines prior to transplant, would initiate series accordingly  - Recieved hep A and hep B vaccines thus far      Patient requires continuous monitoring with bedside rhythm monitoring, pulse ox monitoring, and intermittent blood gas analysis. Care plan discussed with ICU care team. Patient remained critical and at risk for life threatening decompensation.  Patient seen, examined and plan discussed with CCU team during rounds.     I have personally provided _30__ minutes of critical care time excluding time spent on separate procedures, in addition to initial critical care time provided by the CICU Attending, Dr. Lees.    Rita Henry, [facility]

## 2023-11-26 NOTE — PROGRESS NOTE ADULT - SUBJECTIVE AND OBJECTIVE BOX
PATIENT:  DEVORAH VALENCIA  [id number]    CHIEF COMPLAINT:  Patient is a 58y old male who presents with a chief complaint of cardiogenic shock (2023 08:37)      INTERVAL HISTORYOVERNIGHT EVENTS:      REVIEW OF SYSTEMS:    Constitutional:     [ ] negative [ ] fevers [ ] chills [ ] weight loss [ ] weight gain  HEENT:                  [ ] negative [ ] dry eyes [ ] eye irritation [ ] postnasal drip [ ] nasal congestion  CV:                         [ ] negative  [ ] chest pain [ ] orthopnea [ ] palpitations [ ] murmur  Resp:                     [ ] negative [ ] cough [ ] shortness of breath [ ] dyspnea [ ] wheezing [ ] sputum [ ] hemoptysis  GI:                          [ ] negative [ ] nausea [ ] vomiting [ ] diarrhea [ ] constipation [ ] abd pain [ ] dysphagia   :                        [ ] negative [ ] dysuria [ ] nocturia [ ] hematuria [ ] increased urinary frequency  Musculoskeletal: [ ] negative [ ] back pain [ ] myalgias [ ] arthralgias [ ] fracture  Skin:                       [ ] negative [ ] rash [ ] itch  Neurological:        [ ] negative [ ] headache [ ] dizziness [ ] syncope [ ] weakness [ ] numbness  Psychiatric:           [ ] negative [ ] anxiety [ ] depression    MEDICATIONS:  MEDICATIONS  (STANDING):  aMIOdarone    Tablet 200 milliGRAM(s) Oral daily  aMIOdarone    Tablet   Oral   aspirin  chewable 81 milliGRAM(s) Oral daily  atorvastatin 80 milliGRAM(s) Oral at bedtime  budesonide  80 MICROgram(s)/formoterol 4.5 MICROgram(s) Inhaler 2 Puff(s) Inhalation two times a day  carvedilol 25 milliGRAM(s) Oral every 12 hours  chlorhexidine 2% Cloths 1 Application(s) Topical daily  dextrose 50% Injectable 12.5 Gram(s) IV Push once  dextrose 50% Injectable 25 Gram(s) IV Push once  heparin  Infusion 1600 Unit(s)/Hr (13 mL/Hr) IV Continuous <Continuous>  hydrALAZINE 100 milliGRAM(s) Oral three times a day  insulin glargine Injectable (LANTUS) 12 Unit(s) SubCutaneous at bedtime  insulin lispro (ADMELOG) corrective regimen sliding scale   SubCutaneous at bedtime  insulin lispro (ADMELOG) corrective regimen sliding scale   SubCutaneous three times a day before meals  insulin lispro Injectable (ADMELOG) 8 Unit(s) SubCutaneous three times a day before meals  isosorbide   dinitrate Tablet (ISORDIL) 40 milliGRAM(s) Oral three times a day  polyethylene glycol 3350 17 Gram(s) Oral two times a day  senna 2 Tablet(s) Oral at bedtime    MEDICATIONS  (PRN):  hydrOXYzine hydrochloride 25 milliGRAM(s) Oral two times a day PRN Anxiety    ALLERGIES:  penicillins (Unknown)    OBJECTIVE:  ICU Vital Signs Last 24 Hrs  T(C): 36.7 (2023 03:00), Max: 37 (2023 16:00)  T(F): 98.1 (2023 03:00), Max: 98.6 (2023 16:00)  HR: 72 (2023 06:00) (67 - 78)  RR: 18 (2023 06:00) (16 - 26)  SpO2: 98% (2023 06:00) (96% - 100%)    O2 Parameters below as of 2023 06:00  Patient On (Oxygen Delivery Method): room air    Adult Advanced Hemodynamics Last 24 Hrs  CVP(mm Hg): 3 (2023 18:00) (0 - 16)  CVP(cm H2O): --  CO: --  CI: --  PA: 24/10 (2023 18:00) (24/10 - 37/12)  PA(mean): 16 (2023 18:00) (4 - 23)  PCWP: --  SVR: --  SVRI: --  PVR: --  PVRI: --    CAPILLARY BLOOD GLUCOSE  POCT Blood Glucose.: 203 mg/dL (2023 21:08)  POCT Blood Glucose.: 128 mg/dL (2023 17:15)  POCT Blood Glucose.: 183 mg/dL (2023 11:05)  POCT Blood Glucose.: 154 mg/dL (2023 07:54)    I&O's Summary    2023 07:01  -  2023 07:00  --------------------------------------------------------  IN: 1000 mL / OUT: 1700 mL / NET: -700 mL    2023 07:01  -  2023 06:32  --------------------------------------------------------  IN: 1759 mL / OUT: 2350 mL / NET: -591 mL      Daily     Daily Weight in k.8 (2023 06:00)    PHYSICAL EXAMINATION:  General: WN/WD NAD  HEENT: PERRLA, EOMI, moist mucous membranes  Neurology: A&Ox3, nonfocal, MOISE x 4  Respiratory: CTA B/L, normal respiratory effort, no wheezes, crackles, rales  CV: RRR, S1S2, no murmurs, rubs or gallops  Abdominal: Soft, NT, ND +BS, Last BM  Extremities: No edema, + peripheral pulses  Lines:    LABS:                          11.6   9.48  )-----------( 198      ( 2023 04:30 )             35.4     11-26    129<L>  |  97  |  37<H>  ----------------------------<  148<H>  5.1   |  21<L>  |  1.56<H>    Ca    10.0      2023 04:30  Phos  3.5       Mg     2.0         TPro  7.6  /  Alb  3.9  /  TBili  0.4  /  DBili  x   /  AST  25  /  ALT  45  /  AlkPhos  79  11-26    LIVER FUNCTIONS - ( 2023 04:30 )  Alb: 3.9 g/dL / Pro: 7.6 g/dL / ALK PHOS: 79 U/L / ALT: 45 U/L / AST: 25 U/L / GGT: x           PT/INR - ( 2023 04:30 )   PT: 13.2 sec;   INR: 1.21 ratio    PTT - ( 2023 04:30 )  PTT:72.0 sec    Urinalysis Basic - ( 2023 04:30 )    Color: x / Appearance: x / SG: x / pH: x  Gluc: 148 mg/dL / Ketone: x  / Bili: x / Urobili: x   Blood: x / Protein: x / Nitrite: x   Leuk Esterase: x / RBC: x / WBC x   Sq Epi: x / Non Sq Epi: x / Bacteria: x PATIENT:  DEVORAH VALENCIA  [id number]    CHIEF COMPLAINT:  Patient is a 58y old male who presents with a chief complaint of cardiogenic shock (2023 08:37)      INTERVAL HISTORY/OVERNIGHT EVENTS:  - Persistently refusing hygiene care, despite education  REVIEW OF SYSTEMS:    Constitutional:     [X] negative [ ] fevers [ ] chills [ ] weight loss [ ] weight gain  HEENT:                  [X] negative [ ] dry eyes [ ] eye irritation [ ] postnasal drip [ ] nasal congestion  CV:                         [X] negative  [ ] chest pain [ ] orthopnea [ ] palpitations [ ] murmur  Resp:                     [X] negative [ ] cough [ ] shortness of breath [ ] dyspnea [ ] wheezing [ ] sputum [ ] hemoptysis  GI:                          [X] negative [ ] nausea [ ] vomiting [ ] diarrhea [ ] constipation [ ] abd pain [ ] dysphagia   :                        [X] negative [ ] dysuria [ ] nocturia [ ] hematuria [ ] increased urinary frequency  Musculoskeletal: [X] negative [ ] back pain [ ] myalgias [ ] arthralgias [ ] fracture  Skin:                       [X] negative [ ] rash [ ] itch  Neurological:        [X] negative [ ] headache [ ] dizziness [ ] syncope [ ] weakness [ ] numbness  Psychiatric:           [X] negative [ ] anxiety [ ] depression    MEDICATIONS:  MEDICATIONS  (STANDING):  aMIOdarone    Tablet 200 milliGRAM(s) Oral daily  aMIOdarone    Tablet   Oral   aspirin  chewable 81 milliGRAM(s) Oral daily  atorvastatin 80 milliGRAM(s) Oral at bedtime  budesonide  80 MICROgram(s)/formoterol 4.5 MICROgram(s) Inhaler 2 Puff(s) Inhalation two times a day  carvedilol 25 milliGRAM(s) Oral every 12 hours  chlorhexidine 2% Cloths 1 Application(s) Topical daily  dextrose 50% Injectable 12.5 Gram(s) IV Push once  dextrose 50% Injectable 25 Gram(s) IV Push once  heparin  Infusion 1600 Unit(s)/Hr (13 mL/Hr) IV Continuous <Continuous>  hydrALAZINE 100 milliGRAM(s) Oral three times a day  insulin glargine Injectable (LANTUS) 12 Unit(s) SubCutaneous at bedtime  insulin lispro (ADMELOG) corrective regimen sliding scale   SubCutaneous at bedtime  insulin lispro (ADMELOG) corrective regimen sliding scale   SubCutaneous three times a day before meals  insulin lispro Injectable (ADMELOG) 8 Unit(s) SubCutaneous three times a day before meals  isosorbide   dinitrate Tablet (ISORDIL) 40 milliGRAM(s) Oral three times a day  polyethylene glycol 3350 17 Gram(s) Oral two times a day  senna 2 Tablet(s) Oral at bedtime    MEDICATIONS  (PRN):  hydrOXYzine hydrochloride 25 milliGRAM(s) Oral two times a day PRN Anxiety    ALLERGIES:  penicillins (Unknown)    OBJECTIVE:  ICU Vital Signs Last 24 Hrs  T(C): 36.7 (2023 03:00), Max: 37 (2023 16:00)  T(F): 98.1 (2023 03:00), Max: 98.6 (2023 16:00)  HR: 72 (2023 06:00) (67 - 78)  RR: 18 (2023 06:00) (16 - 26)  SpO2: 98% (2023 06:00) (96% - 100%)    O2 Parameters below as of 2023 06:00  Patient On (Oxygen Delivery Method): room air    Adult Advanced Hemodynamics Last 24 Hrs  CVP(mm Hg): 3 (2023 18:00) (0 - 16)  CVP(cm H2O): --  CO: --  CI: --  PA: 24/10 (2023 18:00) (24/10 - 37/12)  PA(mean): 16 (2023 18:00) (4 - 23)  PCWP: --  SVR: --  SVRI: --  PVR: --  PVRI: --    CAPILLARY BLOOD GLUCOSE  POCT Blood Glucose.: 203 mg/dL (2023 21:08)  POCT Blood Glucose.: 128 mg/dL (2023 17:15)  POCT Blood Glucose.: 183 mg/dL (2023 11:05)  POCT Blood Glucose.: 154 mg/dL (2023 07:54)    I&O's Summary    2023 07:01  -  2023 07:00  --------------------------------------------------------  IN: 1000 mL / OUT: 1700 mL / NET: -700 mL    2023 07:01  -  2023 06:32  --------------------------------------------------------  IN: 1759 mL / OUT: 2350 mL / NET: -591 mL      Daily     Daily Weight in k.8 (2023 06:00)    PHYSICAL EXAMINATION:  General: WN/WD NAD  HEENT: PERRL, moist mucous membranes  Neurology: A&Ox3, nonfocal, MOISE x 4  Respiratory: CTA B/L, normal respiratory effort, no wheezes, crackles, rales  CV: RRR, S1S2, no murmurs, rubs or gallops  Abdominal: Soft, NT, ND +BS  Extremities: No edema, + peripheral pulses  Lines: CDI    LABS:                          11.6   9.48  )-----------( 198      ( 2023 04:30 )             35.4     -    129<L>  |  97  |  37<H>  ----------------------------<  148<H>  5.1   |  21<L>  |  1.56<H>    Ca    10.0      2023 04:30  Phos  3.5       Mg     2.0         TPro  7.6  /  Alb  3.9  /  TBili  0.4  /  DBili  x   /  AST  25  /  ALT  45  /  AlkPhos  79      LIVER FUNCTIONS - ( 2023 04:30 )  Alb: 3.9 g/dL / Pro: 7.6 g/dL / ALK PHOS: 79 U/L / ALT: 45 U/L / AST: 25 U/L / GGT: x           PT/INR - ( 2023 04:30 )   PT: 13.2 sec;   INR: 1.21 ratio    PTT - ( 2023 04:30 )  PTT:72.0 sec    Urinalysis Basic - ( 2023 04:30 )    Color: x / Appearance: x / SG: x / pH: x  Gluc: 148 mg/dL / Ketone: x  / Bili: x / Urobili: x   Blood: x / Protein: x / Nitrite: x   Leuk Esterase: x / RBC: x / WBC x   Sq Epi: x / Non Sq Epi: x / Bacteria: x

## 2023-11-26 NOTE — PROGRESS NOTE ADULT - SUBJECTIVE AND OBJECTIVE BOX
LD VALENCIA  58y Male  MRN:[id number]    Patient is a 58y old  Male who presents with a chief complaint of NSTEMI (01 Nov 2023 20:29)    HPI:  57yo M w/ hx HTN, CAD w/ 1 stent in 2009, ICH (2008) presenting with abn ekg. Patient presented to [facility] where he was found to have STEMI, recommended to get cath however patient did not want to get it there so it left and came here.  Patient initially had cough, congestion, fever, was placed on antibiotics on Sunday.  Started feeling nauseous and had a presyncopal event after which he presented to ED last night.  Had chest pain as well.  Chest pain is midsternal.  Not currently having chest pain.  Received 4 aspirin 30 min pta. (01 Nov 2023 15:11)      Patient seen and evaluated at bedside in CCU. interval events noted    Interval HPI: remain in ccu. IABP in place   no acute events o/n        PAST MEDICAL & SURGICAL HISTORY:  HTN (hypertension)      CAD (coronary artery disease)  2009; stent      Intracranial hemorrhage  2008      Respiratory arrest  december 1st      Myocardial infarction, unspecified MI type, unspecified artery      History of coronary artery stent placement          REVIEW OF SYSTEMS:  as per hpi     VITALS:   ICU Vital Signs Last 24 Hrs  T(C): 36.6 (26 Nov 2023 07:00), Max: 37 (25 Nov 2023 16:00)  T(F): 97.8 (26 Nov 2023 07:00), Max: 98.6 (25 Nov 2023 16:00)  HR: 82 (26 Nov 2023 11:00) (66 - 82)  BP: --  BP(mean): --  ABP: --  ABP(mean): --  RR: 18 (26 Nov 2023 11:00) (16 - 26)  SpO2: 98% (26 Nov 2023 11:00) (96% - 100%)    O2 Parameters below as of 26 Nov 2023 09:00  Patient On (Oxygen Delivery Method): room air                   PHYSICAL EXAM:  GENERAL: NAD, comfortable   HEAD:  Atraumatic, Normocephalic  EYES: EOMI, PERRLA, conjunctiva and sclera clear  NECK: Supple, No JVD   CHEST/LUNG: Clear to auscultation bilaterally; No wheeze  HEART: Regular rate and rhythm; No murmurs, rubs, or gallops  ABDOMEN: Soft, Nontender, Nondistended; Bowel sounds present  EXTREMITIES:  2+ Peripheral Pulses, No clubbing, cyanosis, or edema  NEUROLOGY: nonfocal  SKIN: No rashes or lesions  +iabp    Consultant(s) Notes Reviewed:  [x ] YES  [ ] NO  Care Discussed with Consultants/Other Providers [ x] YES  [ ] NO    MEDS:   MEDICATIONS  (STANDING):  aMIOdarone    Tablet   Oral   aMIOdarone    Tablet 200 milliGRAM(s) Oral daily  aspirin  chewable 81 milliGRAM(s) Oral daily  atorvastatin 80 milliGRAM(s) Oral at bedtime  budesonide  80 MICROgram(s)/formoterol 4.5 MICROgram(s) Inhaler 2 Puff(s) Inhalation two times a day  carvedilol 25 milliGRAM(s) Oral every 12 hours  chlorhexidine 2% Cloths 1 Application(s) Topical daily  dextrose 50% Injectable 25 Gram(s) IV Push once  dextrose 50% Injectable 12.5 Gram(s) IV Push once  heparin  Infusion 1600 Unit(s)/Hr (13 mL/Hr) IV Continuous <Continuous>  hydrALAZINE 100 milliGRAM(s) Oral three times a day  insulin glargine Injectable (LANTUS) 12 Unit(s) SubCutaneous at bedtime  insulin lispro (ADMELOG) corrective regimen sliding scale   SubCutaneous at bedtime  insulin lispro (ADMELOG) corrective regimen sliding scale   SubCutaneous three times a day before meals  insulin lispro Injectable (ADMELOG) 8 Unit(s) SubCutaneous three times a day before meals  isosorbide   dinitrate Tablet (ISORDIL) 40 milliGRAM(s) Oral three times a day  polyethylene glycol 3350 17 Gram(s) Oral two times a day  senna 2 Tablet(s) Oral at bedtime    MEDICATIONS  (PRN):  hydrOXYzine hydrochloride 25 milliGRAM(s) Oral two times a day PRN Anxiety      ALLERGIES:  penicillins (Unknown)      LABS:                         11.6   9.48  )-----------( 198      ( 26 Nov 2023 04:30 )             35.4   11-26    129<L>  |  97  |  37<H>  ----------------------------<  148<H>  5.1   |  21<L>  |  1.56<H>    Ca    10.0      26 Nov 2023 04:30  Phos  3.5     11-26  Mg     2.0     11-26    TPro  7.6  /  Alb  3.9  /  TBili  0.4  /  DBili  x   /  AST  25  /  ALT  45  /  AlkPhos  79  11-26      < from: Colonoscopy (11.17.23 @ 10:35) >                                                                                                        Impression:          - The entire examined colon is normal on direct and retroflexion views.                       - No specimens collected.  Recommendation:      - Resume previous diet today.                       - No large polyps or masses detected, No objection from GI standpoint to                 proceed with heart transplantation/advanced heart therapies.                       - Please call back should any further questions or concerns arise.    < end of copied text >     < from: Xray Chest 1 View- PORTABLE-Urgent (11.01.23 @ 07:42) >    IMPRESSION:  Clear lungs.    ---End of Report ---        < end of copied text >  < from: TTE W or WO Ultrasound Enhancing Agent (11.01.23 @ 10:23) >  _____________________________     CONCLUSIONS:      1. Left ventricular cavity is moderately dilated. Left ventricular wall thickness is normal. Left ventricular systolic function is severely decreased with an ejection fraction of 32 % by Chinchilla's method of disks. Regional wall motion abnormalities present.   2. Multiple segmental abnormalities exist. See findings.   3. There is moderate (grade 2) left ventricular diastolic dysfunction, with indeterminant filling pressure.   4. Normal right ventricular cavity size, wall thickness, and systolic function.   5. No significant valvular disease.   6. No pericardial effusion seen.   7. Compared to the transthoracic echocardiogram performed on 1/25/2017 the areas of akinesis are unchanged but there has been a decline in LV systolic function with new areas of hypokinesis.    __________________________________________________________________    < end of copied text >  < from: TTE Limited W or WO Ultrasound Enhancing Agent (11.02.23 @ 07:41) >  __________________________     CONCLUSIONS:      1. After obtaining consent, Definity ultrasound enhancing agent was given for enhanced left ventricular opacification and improved delineation of the left ventricular endocardial borders. Left ventricular systolic function is severely decreased with a calculated ejection fraction of 22 % by the Chinchilla's biplane method of disks. There is a left ventricular thrombus.   2. Findings were discussed with Litzy BOSS on 11/2/2023 at 8.49am.   3. There is a left ventricular thrombus.    _________________________________________________________________    < end of copied text >

## 2023-11-26 NOTE — PROGRESS NOTE ADULT - SUBJECTIVE AND OBJECTIVE BOX
[facility] Division of Kidney Diseases & Hypertension  FOLLOW UP NOTE  --------------------------------------------------------------------------------  Chief Complaint:    24 hour events/subjective:   Patient felt well    PAST HISTORY  --------------------------------------------------------------------------------  No significant changes to PMH, PSH, FHx, SHx, unless otherwise noted    ALLERGIES & MEDICATIONS  --------------------------------------------------------------------------------  Allergies    penicillins (Unknown)    Intolerances      Standing Inpatient Medications  aMIOdarone    Tablet 200 milliGRAM(s) Oral daily  aMIOdarone    Tablet   Oral   aspirin  chewable 81 milliGRAM(s) Oral daily  atorvastatin 80 milliGRAM(s) Oral at bedtime  budesonide  80 MICROgram(s)/formoterol 4.5 MICROgram(s) Inhaler 2 Puff(s) Inhalation two times a day  carvedilol 25 milliGRAM(s) Oral every 12 hours  chlorhexidine 2% Cloths 1 Application(s) Topical daily  dextrose 50% Injectable 12.5 Gram(s) IV Push once  dextrose 50% Injectable 25 Gram(s) IV Push once  heparin  Infusion 1600 Unit(s)/Hr IV Continuous <Continuous>  hydrALAZINE 100 milliGRAM(s) Oral three times a day  insulin glargine Injectable (LANTUS) 12 Unit(s) SubCutaneous at bedtime  insulin lispro (ADMELOG) corrective regimen sliding scale   SubCutaneous at bedtime  insulin lispro (ADMELOG) corrective regimen sliding scale   SubCutaneous three times a day before meals  insulin lispro Injectable (ADMELOG) 8 Unit(s) SubCutaneous three times a day before meals  isosorbide   dinitrate Tablet (ISORDIL) 40 milliGRAM(s) Oral three times a day  polyethylene glycol 3350 17 Gram(s) Oral two times a day  senna 2 Tablet(s) Oral at bedtime    PRN Inpatient Medications  hydrOXYzine hydrochloride 25 milliGRAM(s) Oral two times a day PRN      REVIEW OF SYSTEMS  --------------------------------------------------------------------------------  Gen: No weight changes, fatigue, fevers/chills, weakness  Skin: No rashes  Head/Eyes/Ears/Mouth: No headache; Normal hearing; Normal vision w/o blurriness; No sinus pain/discomfort, sore throat  Respiratory: No dyspnea, cough, wheezing, hemoptysis  CV: No chest pain, PND, orthopnea  GI: No abdominal pain, diarrhea, constipation, nausea, vomiting, melena, hematochezia  : No increased frequency, dysuria, hematuria, nocturia  MSK: No joint pain/swelling; no back pain; no edema  Neuro: No dizziness/lightheadedness, weakness, seizures, numbness, tingling  Heme: No easy bruising or bleeding  Endo: No heat/cold intolerance  Psych: No significant nervousness, anxiety, stress, depression    All other systems were reviewed and are negative, except as noted.    VITALS/PHYSICAL EXAM  --------------------------------------------------------------------------------  T(C): 36.6 (11-26-23 @ 07:00), Max: 37 (11-25-23 @ 16:00)  HR: 80 (11-26-23 @ 13:00) (66 - 82)  BP: --  RR: 20 (11-26-23 @ 13:00) (16 - 26)  SpO2: 98% (11-26-23 @ 13:00) (97% - 100%)  Wt(kg): --        11-25-23 @ 07:01  -  11-26-23 @ 07:00  --------------------------------------------------------  IN: 1759 mL / OUT: 2350 mL / NET: -591 mL    11-26-23 @ 07:01  -  11-26-23 @ 13:32  --------------------------------------------------------  IN: 438 mL / OUT: 800 mL / NET: -362 mL      Physical Exam:  	Gen: NAD, well-appearing  	HEENT: PERRL, supple neck, clear oropharynx  	Pulm: CTA B/L  	CV: RRR, S1S2; no rub  	Back: No spinal or CVA tenderness; no sacral edema  	Abd: +BS, soft, nontender/nondistended  	: No suprapubic tenderness  	UE: Warm, FROM, no clubbing, intact strength; no edema; no asterixis  	LE: Warm, FROM, no clubbing, intact strength; no edema  	Neuro: No focal deficits, intact gait  	Psych: Normal affect and mood  	Skin: Warm, without rashes  	Vascular access:    LABS/STUDIES  --------------------------------------------------------------------------------              11.6   9.48  >-----------<  198      [11-26-23 @ 04:30]              35.4     129  |  97  |  37  ----------------------------<  148      [11-26-23 @ 04:30]  5.1   |  21  |  1.56        Ca     10.0     [11-26-23 @ 04:30]      Mg     2.0     [11-26-23 @ 04:30]      Phos  3.5     [11-26-23 @ 04:30]    TPro  7.6  /  Alb  3.9  /  TBili  0.4  /  DBili  x   /  AST  25  /  ALT  45  /  AlkPhos  79  [11-26-23 @ 04:30]    PT/INR: PT 13.2 , INR 1.21       [11-26-23 @ 04:30]  PTT: 72.0       [11-26-23 @ 04:30]      Creatinine Trend:  SCr 1.56 [11-26 @ 04:30]  SCr 1.60 [11-25 @ 04:56]  SCr 1.52 [11-24 @ 06:15]  SCr 1.68 [11-23 @ 04:44]  SCr 1.52 [11-22 @ 04:16]    Urinalysis - [11-26-23 @ 04:30]      Color  / Appearance  / SG  / pH       Gluc 148 / Ketone   / Bili  / Urobili        Blood  / Protein  / Leuk Est  / Nitrite       RBC  / WBC  / Hyaline  / Gran  / Sq Epi  / Non Sq Epi  / Bacteria     Urine Creatinine 62      [11-21-23 @ 03:50]  Urine Protein <7      [11-21-23 @ 03:50]        ANCA: cANCA Negative, pANCA Negative, atypical ANCA Negative      [11-21-23 @ 03:50]

## 2023-11-26 NOTE — PROGRESS NOTE ADULT - CRITICAL CARE ATTENDING COMMENT
History of anxiety disorder and CAD s/p PCI  Admitted with cardiogenic shock and respiratory failure in the setting of stent restenosis  Transferred out and had VT/VF cardiac arrest  Readmitted to CICU  A+Ox3, anxious - continue Atarax prn  Cardiogenic shock requiring IABP, also on Bidil for afterload reduction - will maintain  S/p VT/VF arrest on Amiodarone PO and Coreg  SpO2 mid 90s on room air  Soft diet; refusing bowel regimen intermittently and has not had a bowel movement in over a week  Likely CKD post arrest, compensated on exam, hyponatremic, not overloaded on exam, CVP yesterday was 2-3   Continue to hold diuretics  H/H low but acceptable on Heparin drip for LV thrombus and IABP  Afebrile, on Vancomycin for enterococcus bacteremia, now cleared and IABP replaced  Sugars controlled on Lantus  IABP 11/20

## 2023-11-26 NOTE — PROGRESS NOTE ADULT - ASSESSMENT
# ARIC -  Serum creatinine is stable at 1.6-1.7mg/dL. No indication for dialysis.    # Hyponatremia - limit water intake to < 1 Liter.   Had Right heat cath yesterday. Per CICU team - patient has low central pressure. Given  hypertonic saline instead of UreNa.   Now serum creatinine is slowing improving from 128-129.     # Hyperkalemia - resolved      Suzanne Jackson MD  Attending Nephrologist  [phone] or via [employer] Teams .

## 2023-11-27 LAB
ALBUMIN SERPL ELPH-MCNC: 3.8 G/DL — SIGNIFICANT CHANGE UP (ref 3.3–5)
ALBUMIN SERPL ELPH-MCNC: 3.8 G/DL — SIGNIFICANT CHANGE UP (ref 3.3–5)
ALBUMIN SERPL ELPH-MCNC: 4.2 G/DL — SIGNIFICANT CHANGE UP (ref 3.3–5)
ALBUMIN SERPL ELPH-MCNC: 4.2 G/DL — SIGNIFICANT CHANGE UP (ref 3.3–5)
ALP SERPL-CCNC: 80 U/L — SIGNIFICANT CHANGE UP (ref 40–120)
ALP SERPL-CCNC: 80 U/L — SIGNIFICANT CHANGE UP (ref 40–120)
ALP SERPL-CCNC: 81 U/L — SIGNIFICANT CHANGE UP (ref 40–120)
ALP SERPL-CCNC: 81 U/L — SIGNIFICANT CHANGE UP (ref 40–120)
ALT FLD-CCNC: 42 U/L — SIGNIFICANT CHANGE UP (ref 10–45)
ALT FLD-CCNC: 42 U/L — SIGNIFICANT CHANGE UP (ref 10–45)
ALT FLD-CCNC: 47 U/L — HIGH (ref 10–45)
ALT FLD-CCNC: 47 U/L — HIGH (ref 10–45)
ANION GAP SERPL CALC-SCNC: 13 MMOL/L — SIGNIFICANT CHANGE UP (ref 5–17)
ANION GAP SERPL CALC-SCNC: 13 MMOL/L — SIGNIFICANT CHANGE UP (ref 5–17)
ANION GAP SERPL CALC-SCNC: 14 MMOL/L — SIGNIFICANT CHANGE UP (ref 5–17)
ANION GAP SERPL CALC-SCNC: 14 MMOL/L — SIGNIFICANT CHANGE UP (ref 5–17)
APTT BLD: 64.7 SEC — HIGH (ref 24.5–35.6)
APTT BLD: 64.7 SEC — HIGH (ref 24.5–35.6)
AST SERPL-CCNC: 23 U/L — SIGNIFICANT CHANGE UP (ref 10–40)
AST SERPL-CCNC: 23 U/L — SIGNIFICANT CHANGE UP (ref 10–40)
AST SERPL-CCNC: 29 U/L — SIGNIFICANT CHANGE UP (ref 10–40)
AST SERPL-CCNC: 29 U/L — SIGNIFICANT CHANGE UP (ref 10–40)
BASOPHILS # BLD AUTO: 0.06 K/UL — SIGNIFICANT CHANGE UP (ref 0–0.2)
BASOPHILS # BLD AUTO: 0.06 K/UL — SIGNIFICANT CHANGE UP (ref 0–0.2)
BASOPHILS NFR BLD AUTO: 0.7 % — SIGNIFICANT CHANGE UP (ref 0–2)
BASOPHILS NFR BLD AUTO: 0.7 % — SIGNIFICANT CHANGE UP (ref 0–2)
BILIRUB SERPL-MCNC: 0.4 MG/DL — SIGNIFICANT CHANGE UP (ref 0.2–1.2)
BUN SERPL-MCNC: 38 MG/DL — HIGH (ref 7–23)
BUN SERPL-MCNC: 38 MG/DL — HIGH (ref 7–23)
BUN SERPL-MCNC: 42 MG/DL — HIGH (ref 7–23)
BUN SERPL-MCNC: 42 MG/DL — HIGH (ref 7–23)
CALCIUM SERPL-MCNC: 10.2 MG/DL — SIGNIFICANT CHANGE UP (ref 8.4–10.5)
CALCIUM SERPL-MCNC: 10.2 MG/DL — SIGNIFICANT CHANGE UP (ref 8.4–10.5)
CALCIUM SERPL-MCNC: 9.7 MG/DL — SIGNIFICANT CHANGE UP (ref 8.4–10.5)
CALCIUM SERPL-MCNC: 9.7 MG/DL — SIGNIFICANT CHANGE UP (ref 8.4–10.5)
CHLORIDE SERPL-SCNC: 96 MMOL/L — SIGNIFICANT CHANGE UP (ref 96–108)
CHLORIDE SERPL-SCNC: 96 MMOL/L — SIGNIFICANT CHANGE UP (ref 96–108)
CHLORIDE SERPL-SCNC: 99 MMOL/L — SIGNIFICANT CHANGE UP (ref 96–108)
CHLORIDE SERPL-SCNC: 99 MMOL/L — SIGNIFICANT CHANGE UP (ref 96–108)
CO2 SERPL-SCNC: 17 MMOL/L — LOW (ref 22–31)
CO2 SERPL-SCNC: 17 MMOL/L — LOW (ref 22–31)
CO2 SERPL-SCNC: 18 MMOL/L — LOW (ref 22–31)
CO2 SERPL-SCNC: 18 MMOL/L — LOW (ref 22–31)
CREAT SERPL-MCNC: 1.5 MG/DL — HIGH (ref 0.5–1.3)
CREAT SERPL-MCNC: 1.5 MG/DL — HIGH (ref 0.5–1.3)
CREAT SERPL-MCNC: 1.91 MG/DL — HIGH (ref 0.5–1.3)
CREAT SERPL-MCNC: 1.91 MG/DL — HIGH (ref 0.5–1.3)
EGFR: 40 ML/MIN/1.73M2 — LOW
EGFR: 40 ML/MIN/1.73M2 — LOW
EGFR: 53 ML/MIN/1.73M2 — LOW
EGFR: 53 ML/MIN/1.73M2 — LOW
EOSINOPHIL # BLD AUTO: 0.5 K/UL — SIGNIFICANT CHANGE UP (ref 0–0.5)
EOSINOPHIL # BLD AUTO: 0.5 K/UL — SIGNIFICANT CHANGE UP (ref 0–0.5)
EOSINOPHIL NFR BLD AUTO: 5.6 % — SIGNIFICANT CHANGE UP (ref 0–6)
EOSINOPHIL NFR BLD AUTO: 5.6 % — SIGNIFICANT CHANGE UP (ref 0–6)
GLUCOSE BLDC GLUCOMTR-MCNC: 119 MG/DL — HIGH (ref 70–99)
GLUCOSE BLDC GLUCOMTR-MCNC: 119 MG/DL — HIGH (ref 70–99)
GLUCOSE BLDC GLUCOMTR-MCNC: 164 MG/DL — HIGH (ref 70–99)
GLUCOSE BLDC GLUCOMTR-MCNC: 164 MG/DL — HIGH (ref 70–99)
GLUCOSE BLDC GLUCOMTR-MCNC: 180 MG/DL — HIGH (ref 70–99)
GLUCOSE BLDC GLUCOMTR-MCNC: 180 MG/DL — HIGH (ref 70–99)
GLUCOSE BLDC GLUCOMTR-MCNC: 195 MG/DL — HIGH (ref 70–99)
GLUCOSE BLDC GLUCOMTR-MCNC: 195 MG/DL — HIGH (ref 70–99)
GLUCOSE SERPL-MCNC: 176 MG/DL — HIGH (ref 70–99)
GLUCOSE SERPL-MCNC: 176 MG/DL — HIGH (ref 70–99)
GLUCOSE SERPL-MCNC: 178 MG/DL — HIGH (ref 70–99)
GLUCOSE SERPL-MCNC: 178 MG/DL — HIGH (ref 70–99)
HCT VFR BLD CALC: 36.6 % — LOW (ref 39–50)
HCT VFR BLD CALC: 36.6 % — LOW (ref 39–50)
HGB BLD-MCNC: 12.2 G/DL — LOW (ref 13–17)
HGB BLD-MCNC: 12.2 G/DL — LOW (ref 13–17)
IMM GRANULOCYTES NFR BLD AUTO: 0.4 % — SIGNIFICANT CHANGE UP (ref 0–0.9)
IMM GRANULOCYTES NFR BLD AUTO: 0.4 % — SIGNIFICANT CHANGE UP (ref 0–0.9)
INR BLD: 1.21 RATIO — HIGH (ref 0.85–1.18)
INR BLD: 1.21 RATIO — HIGH (ref 0.85–1.18)
LACTATE SERPL-SCNC: 1.4 MMOL/L — SIGNIFICANT CHANGE UP (ref 0.5–2)
LACTATE SERPL-SCNC: 1.4 MMOL/L — SIGNIFICANT CHANGE UP (ref 0.5–2)
LYMPHOCYTES # BLD AUTO: 1.53 K/UL — SIGNIFICANT CHANGE UP (ref 1–3.3)
LYMPHOCYTES # BLD AUTO: 1.53 K/UL — SIGNIFICANT CHANGE UP (ref 1–3.3)
LYMPHOCYTES # BLD AUTO: 17.1 % — SIGNIFICANT CHANGE UP (ref 13–44)
LYMPHOCYTES # BLD AUTO: 17.1 % — SIGNIFICANT CHANGE UP (ref 13–44)
MAGNESIUM SERPL-MCNC: 2 MG/DL — SIGNIFICANT CHANGE UP (ref 1.6–2.6)
MCHC RBC-ENTMCNC: 29.4 PG — SIGNIFICANT CHANGE UP (ref 27–34)
MCHC RBC-ENTMCNC: 29.4 PG — SIGNIFICANT CHANGE UP (ref 27–34)
MCHC RBC-ENTMCNC: 33.3 GM/DL — SIGNIFICANT CHANGE UP (ref 32–36)
MCHC RBC-ENTMCNC: 33.3 GM/DL — SIGNIFICANT CHANGE UP (ref 32–36)
MCV RBC AUTO: 88.2 FL — SIGNIFICANT CHANGE UP (ref 80–100)
MCV RBC AUTO: 88.2 FL — SIGNIFICANT CHANGE UP (ref 80–100)
MONOCYTES # BLD AUTO: 0.66 K/UL — SIGNIFICANT CHANGE UP (ref 0–0.9)
MONOCYTES # BLD AUTO: 0.66 K/UL — SIGNIFICANT CHANGE UP (ref 0–0.9)
MONOCYTES NFR BLD AUTO: 7.4 % — SIGNIFICANT CHANGE UP (ref 2–14)
MONOCYTES NFR BLD AUTO: 7.4 % — SIGNIFICANT CHANGE UP (ref 2–14)
NEUTROPHILS # BLD AUTO: 6.14 K/UL — SIGNIFICANT CHANGE UP (ref 1.8–7.4)
NEUTROPHILS # BLD AUTO: 6.14 K/UL — SIGNIFICANT CHANGE UP (ref 1.8–7.4)
NEUTROPHILS NFR BLD AUTO: 68.8 % — SIGNIFICANT CHANGE UP (ref 43–77)
NEUTROPHILS NFR BLD AUTO: 68.8 % — SIGNIFICANT CHANGE UP (ref 43–77)
NRBC # BLD: 0 /100 WBCS — SIGNIFICANT CHANGE UP (ref 0–0)
NRBC # BLD: 0 /100 WBCS — SIGNIFICANT CHANGE UP (ref 0–0)
PHOSPHATE SERPL-MCNC: 4.2 MG/DL — SIGNIFICANT CHANGE UP (ref 2.5–4.5)
PHOSPHATE SERPL-MCNC: 4.2 MG/DL — SIGNIFICANT CHANGE UP (ref 2.5–4.5)
PHOSPHATE SERPL-MCNC: 4.4 MG/DL — SIGNIFICANT CHANGE UP (ref 2.5–4.5)
PHOSPHATE SERPL-MCNC: 4.4 MG/DL — SIGNIFICANT CHANGE UP (ref 2.5–4.5)
PLATELET # BLD AUTO: 186 K/UL — SIGNIFICANT CHANGE UP (ref 150–400)
PLATELET # BLD AUTO: 186 K/UL — SIGNIFICANT CHANGE UP (ref 150–400)
POTASSIUM SERPL-MCNC: 4.7 MMOL/L — SIGNIFICANT CHANGE UP (ref 3.5–5.3)
POTASSIUM SERPL-SCNC: 4.7 MMOL/L — SIGNIFICANT CHANGE UP (ref 3.5–5.3)
PROT SERPL-MCNC: 7.7 G/DL — SIGNIFICANT CHANGE UP (ref 6–8.3)
PROTHROM AB SERPL-ACNC: 12.6 SEC — SIGNIFICANT CHANGE UP (ref 9.5–13)
PROTHROM AB SERPL-ACNC: 12.6 SEC — SIGNIFICANT CHANGE UP (ref 9.5–13)
RBC # BLD: 4.15 M/UL — LOW (ref 4.2–5.8)
RBC # BLD: 4.15 M/UL — LOW (ref 4.2–5.8)
RBC # FLD: 14.9 % — HIGH (ref 10.3–14.5)
RBC # FLD: 14.9 % — HIGH (ref 10.3–14.5)
SODIUM SERPL-SCNC: 127 MMOL/L — LOW (ref 135–145)
SODIUM SERPL-SCNC: 127 MMOL/L — LOW (ref 135–145)
SODIUM SERPL-SCNC: 130 MMOL/L — LOW (ref 135–145)
SODIUM SERPL-SCNC: 130 MMOL/L — LOW (ref 135–145)
UFH PPP CHRO-ACNC: 0.51 IU/ML — SIGNIFICANT CHANGE UP (ref 0.3–0.7)
UFH PPP CHRO-ACNC: 0.51 IU/ML — SIGNIFICANT CHANGE UP (ref 0.3–0.7)
WBC # BLD: 8.93 K/UL — SIGNIFICANT CHANGE UP (ref 3.8–10.5)
WBC # BLD: 8.93 K/UL — SIGNIFICANT CHANGE UP (ref 3.8–10.5)
WBC # FLD AUTO: 8.93 K/UL — SIGNIFICANT CHANGE UP (ref 3.8–10.5)
WBC # FLD AUTO: 8.93 K/UL — SIGNIFICANT CHANGE UP (ref 3.8–10.5)

## 2023-11-27 PROCEDURE — 93010 ELECTROCARDIOGRAM REPORT: CPT

## 2023-11-27 PROCEDURE — 74018 RADEX ABDOMEN 1 VIEW: CPT | Mod: 26

## 2023-11-27 PROCEDURE — 99232 SBSQ HOSP IP/OBS MODERATE 35: CPT | Mod: GC

## 2023-11-27 PROCEDURE — 90834 PSYTX W PT 45 MINUTES: CPT

## 2023-11-27 PROCEDURE — 99291 CRITICAL CARE FIRST HOUR: CPT

## 2023-11-27 PROCEDURE — 99292 CRITICAL CARE ADDL 30 MIN: CPT

## 2023-11-27 PROCEDURE — 71045 X-RAY EXAM CHEST 1 VIEW: CPT | Mod: 26

## 2023-11-27 PROCEDURE — 99233 SBSQ HOSP IP/OBS HIGH 50: CPT

## 2023-11-27 PROCEDURE — 99291 CRITICAL CARE FIRST HOUR: CPT | Mod: 25

## 2023-11-27 RX ORDER — LACTULOSE 10 G/15ML
30 SOLUTION ORAL EVERY 4 HOURS
Refills: 0 | Status: DISCONTINUED | OUTPATIENT
Start: 2023-11-27 | End: 2023-11-28

## 2023-11-27 RX ORDER — ONDANSETRON 8 MG/1
4 TABLET, FILM COATED ORAL ONCE
Refills: 0 | Status: COMPLETED | OUTPATIENT
Start: 2023-11-27 | End: 2023-11-27

## 2023-11-27 RX ORDER — ACETAMINOPHEN 500 MG
1000 TABLET ORAL ONCE
Refills: 0 | Status: COMPLETED | OUTPATIENT
Start: 2023-11-27 | End: 2023-11-27

## 2023-11-27 RX ORDER — SODIUM CHLORIDE 9 MG/ML
250 INJECTION INTRAMUSCULAR; INTRAVENOUS; SUBCUTANEOUS ONCE
Refills: 0 | Status: COMPLETED | OUTPATIENT
Start: 2023-11-27 | End: 2023-11-27

## 2023-11-27 RX ADMIN — Medication 400 MILLIGRAM(S): at 19:31

## 2023-11-27 RX ADMIN — HEPARIN SODIUM 13 UNIT(S)/HR: 5000 INJECTION INTRAVENOUS; SUBCUTANEOUS at 19:32

## 2023-11-27 RX ADMIN — ISOSORBIDE DINITRATE 40 MILLIGRAM(S): 5 TABLET ORAL at 11:58

## 2023-11-27 RX ADMIN — LACTULOSE 30 GRAM(S): 10 SOLUTION ORAL at 20:22

## 2023-11-27 RX ADMIN — LACTULOSE 30 GRAM(S): 10 SOLUTION ORAL at 11:57

## 2023-11-27 RX ADMIN — Medication 100 MILLIGRAM(S): at 06:28

## 2023-11-27 RX ADMIN — Medication 30 MILLILITER(S): at 17:41

## 2023-11-27 RX ADMIN — CARVEDILOL PHOSPHATE 25 MILLIGRAM(S): 80 CAPSULE, EXTENDED RELEASE ORAL at 05:19

## 2023-11-27 RX ADMIN — AMIODARONE HYDROCHLORIDE 200 MILLIGRAM(S): 400 TABLET ORAL at 05:18

## 2023-11-27 RX ADMIN — Medication 1: at 08:31

## 2023-11-27 RX ADMIN — INSULIN GLARGINE 12 UNIT(S): 100 INJECTION, SOLUTION SUBCUTANEOUS at 22:49

## 2023-11-27 RX ADMIN — ISOSORBIDE DINITRATE 40 MILLIGRAM(S): 5 TABLET ORAL at 05:18

## 2023-11-27 RX ADMIN — Medication 300 MILLIGRAM(S): at 05:22

## 2023-11-27 RX ADMIN — ISOSORBIDE DINITRATE 40 MILLIGRAM(S): 5 TABLET ORAL at 17:24

## 2023-11-27 RX ADMIN — ATORVASTATIN CALCIUM 80 MILLIGRAM(S): 80 TABLET, FILM COATED ORAL at 22:49

## 2023-11-27 RX ADMIN — CARVEDILOL PHOSPHATE 25 MILLIGRAM(S): 80 CAPSULE, EXTENDED RELEASE ORAL at 18:58

## 2023-11-27 RX ADMIN — Medication 100 MILLIGRAM(S): at 13:36

## 2023-11-27 RX ADMIN — HEPARIN SODIUM 13 UNIT(S)/HR: 5000 INJECTION INTRAVENOUS; SUBCUTANEOUS at 05:17

## 2023-11-27 RX ADMIN — Medication 1000 MILLIGRAM(S): at 19:45

## 2023-11-27 RX ADMIN — Medication 81 MILLIGRAM(S): at 11:58

## 2023-11-27 RX ADMIN — Medication 8 UNIT(S): at 08:31

## 2023-11-27 RX ADMIN — SENNA PLUS 2 TABLET(S): 8.6 TABLET ORAL at 22:49

## 2023-11-27 RX ADMIN — BUDESONIDE AND FORMOTEROL FUMARATE DIHYDRATE 2 PUFF(S): 160; 4.5 AEROSOL RESPIRATORY (INHALATION) at 08:54

## 2023-11-27 RX ADMIN — Medication 100 MILLIGRAM(S): at 22:49

## 2023-11-27 RX ADMIN — SODIUM CHLORIDE 500 MILLILITER(S): 9 INJECTION INTRAMUSCULAR; INTRAVENOUS; SUBCUTANEOUS at 13:36

## 2023-11-27 NOTE — PROGRESS NOTE ADULT - PROBLEM SELECTOR PLAN 4
- Cr on admission 1.2, peaked to 4  - Was stable around 1.4-1.5; today 1.9. Plan as above  - Support as above.

## 2023-11-27 NOTE — PROGRESS NOTE ADULT - SUBJECTIVE AND OBJECTIVE BOX
Behavioral Cardiology Progress Note     History of Present Illness: Mr. Hardy is a 58 year-old man with history of HTN, CAD (1 stent in 2009), ICH (2008) presented on 11/1 with abnormal EKG. Patient presented to [facility] where he was found to have STEMI, recommended to get cath however patient did not want treatment at OSH so left and came to [facility]. EKG here with ST depression in lateral leads and elevation in anterior leads. Prior to C found to be tachycardic, dyspneic, intubated. LHC 11/1 with chronic total occlusion of LAD and RCA, with elevated RA and PA pressures. TTE 11/1 with severely decreased EF 32%, s/p IABP 11/1.     Social history: Mr. Martin Hardy is a 58-year-old [place] American,  male with three children, including two sons and three daughters. 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 His wife, Brynn, works as an  in a public high school in [place] [facility] [place] [facility] [place]. Mr. Hardy reported three close friendships with men who live in [place]. Notably, one of these men, Dr. Brody Le, is Mr. Hardy’s healthcare proxy and PCP. Mr. Hardy also reported that these friends feel like family due to their closeness. Currently, Mr. Hardy lives in a condominium apartment in [place] with his wife and children. 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 Mr. Hardy noted his parents live in [place] as does one brother and one sister. Mr. Hardy has another sister, diagnosed with bipolar disorder, who lives in [place]. He described feeling distant from his siblings, as they lack a close relationship.      Substance use:    •	Tobacco: Denies current and past tobacco use.    •	Alcohol: Denies current and past alcohol use.   •	Drug: Denies current and past drug use.      Past psychiatric history: Mr. Hardy denied any history of self-harm and suicidal ideation, plan, or attempt. He also denied any history of violent thoughts or behaviors. Denies history of outpatient treatment with a therapist or psychiatrist. With this said, he reported a history of odd thinking related, health-related anxiety. For example, Mr. Hardy reported meeting with a physician about 12 years ago because he feared having a brain tumor. He believed that the presence of a brain tumor could explain his superior memory. Earlier this year, he revealed anxiety to his PCP, who prescribed Mr. Hardy Lorazepam (0.5mg PRN). Per medical chart, Mr. Hardy filled his prescription three separate times in the past year. He reported concern about dependency, given Lorazepam’s status as a controlled medication, and desire to avoid substance abuse.      Psychological assessment: Reports hospital related stress due to lack of privacy, need to use bedpan, and sense of having no control. Talked about work related responsibilities that he deals with online, something he spends time on most days. Reports sleep has improved. Appetite good, meals are "the highlight of my day." Reports his anxiety has been 1 to 1.5/10. Denies depressive symptoms.     Mental Status Exam: Seen lying in bed on IABP on CICU. Awake, alert. Oriented x4. Well related. Maintained good eye contact. Speech was normal volume, rate, tone. Mood was neutral and affect was euthymic. Thought process goal directed, less tangential, content focused on existential worries. Denies s/i. Insight into disease adequate. Immediate judgement good.        Dx: Adjustment Disorder with anxiety     No absolute psychological contraindications for pursuing heart transplant/LVAD with following recommendations:     Psychology will continue to follow for supportive therapy and CBT strategies to manage anxiety.  Seen by psychiatry (11/8), will benefit from follow up.   Maintain ongoing psychiatric follow-up.   Holistic Nurse.   Pet therapy if appropriate.   LVAD and heart transplant support group information provided.       45 minutes spent on patient encounter       Behavioral Cardiology Progress Note     History of Present Illness: Mr. Hardy is a 58 year-old man with history of HTN, CAD (1 stent in 2009), ICH (2008) presented on 11/1 with abnormal EKG. Patient presented to [facility] where he was found to have STEMI, recommended to get cath however patient did not want treatment at OSH so left and came to [facility]. EKG here with ST depression in lateral leads and elevation in anterior leads. Prior to C found to be tachycardic, dyspneic, intubated. LHC 11/1 with chronic total occlusion of LAD and RCA, with elevated RA and PA pressures. TTE 11/1 with severely decreased EF 32%, s/p IABP 11/1.     Social history: Mr. Martin Hardy is a 58-year-old [place] American,  male with three children, including two sons and three daughters. 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 His wife, Brynn, works as an  in a public high school in [place] [facility] [place]. Mr. Hardy reported three close friendships with men who live in [place]. Notably, one of these men, Dr. Brody Le, is Mr. Hardy’s healthcare proxy and PCP. Mr. Hardy also reported that these friends feel like family due to their closeness. Currently, Mr. Hardy lives in a condominium apartment in [place] with his wife and children. 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 Mr. Hardy noted his parents live in [place] as does one brother and one sister. Mr. Hardy has another sister, diagnosed with bipolar disorder, who lives in [place]. He described feeling distant from his siblings, as they lack a close relationship.      Substance use:    •	Tobacco: Denies current and past tobacco use.    •	Alcohol: Denies current and past alcohol use.   •	Drug: Denies current and past drug use.      Past psychiatric history: Mr. Hardy denied any history of self-harm and suicidal ideation, plan, or attempt. He also denied any history of violent thoughts or behaviors. Denies history of outpatient treatment with a therapist or psychiatrist. With this said, he reported a history of odd thinking related, health-related anxiety. For example, Mr. Hardy reported meeting with a physician about 12 years ago because he feared having a brain tumor. He believed that the presence of a brain tumor could explain his superior memory. Earlier this year, he revealed anxiety to his PCP, who prescribed Mr. Hardy Lorazepam (0.5mg PRN). Per medical chart, Mr. Hardy filled his prescription three separate times in the past year. He reported concern about dependency, given Lorazepam’s status as a controlled medication, and desire to avoid substance abuse.      Psychological assessment: Reports hospital related stress due to lack of privacy, need for dependence on staff due to bedrest, and sense of having no control. Rodrigo by focusing on work related responsibilities which he deals with using his laptop. Explored strategies for coping with current stressors such as utilizing cognitive flexibility (mindfulness, problem solving, and viewing situations from other perspectives). Reports sleep has improved. Appetite good, meals are "the highlight of my day." Reports his anxiety has been 1 to 1.5/10. Denies depressive symptoms.     Mental Status Exam: Seen lying in bed on IABP on CICU. Awake, alert. Oriented x4. Well related. Maintained good eye contact. Speech was normal volume, rate, tone. Mood was neutral and affect was euthymic. Thought process goal directed, less tangential, content focused on current health status. Denies s/i. Insight into disease adequate. Immediate judgement good.        Dx: Adjustment Disorder with anxiety    No absolute psychological contraindications for pursuing heart transplant/LVAD with following recommendations:     ·	Psychology will continue to follow for supportive therapy and CBT strategies to manage anxiety.  ·	Seen by psychiatry (11/8), will benefit from follow up.   ·	Maintain ongoing psychiatric follow-up.   ·	Holistic Nurse.   ·	Pet therapy if appropriate.   ·	LVAD and heart transplant support group information provided.       45 minutes spent on patient encounter

## 2023-11-27 NOTE — PROGRESS NOTE ADULT - ASSESSMENT
====================ASSESSMENT ==============  58 male with HTN, CAD (s/p PCI 2008), HFrEF, CVA 2018, and T2DM presenting with chest pressure and unknown tachycardia that was shocked x1, [facility] 11/1 found to have in-stent restenosis of pLAD and  of RCA with elevated RA and PA pressures and severely decreased EF 32%. Admitted to CICU for management of cardiogenic shock and ADHF requiring IABP 11/1 -11/7, with hospital course c/b vfib arrest requiring reinsertion of IABP. Currently undergoing workup for AT evaluation with HF.    Plan:  ====================== NEUROLOGY=====================  # Anxiety  - No Seroquel/antipsychotics since vfib arrest and prolonged QTC  - Psych Eval, recommended SSRI, but pt. refused   - Psych recommending atarax PRN  - Continue to monitor mental status    # PT/Conditioning  - Continue band exercised while on bedrest s/t IABP, IS    ==================== RESPIRATORY======================  # Acute Hypoxemic Respiratory Failure  - s/p x2 intubations for cardiogenic pulm edema and the in setting of cardiac arrest, resolved - extubated 11/10  - Currently on room air with spO2 mid 90s  - Continue incentive spirometry and monitoring of sp02    # Asthma  - c/w albuterol, symbicort and spiriva  - On trelegy at home    ====================CARDIOVASCULAR====================  # Vfib arrest iso ischemia  - Lido gtt off 11/13  - PO Amio load - total of 5g per EP complete 11/17, continue PO amio  - Keep K > 4, Mag > 2.2     # Cardiogenic shock requiring IABP (11/1- 11/7, 11/9-)  - Likely 2/2 NSTEMI and ADHF  - 11/1 LHC: pLAD 100 % in-stent restenosis & mRCA, 100 %. PCWP 30. IABP placed.  - 11/1 TTE: LV dilated. EF 32 %. Regional WMAs present, mod (grade 2) LV diastolic dysfunction  - 11/2 TTE: EF 22% and + LV thrombus  - PRN Bumex IVP as needed  - IABP swapped 11/20 to RFA, continue 1:1 support  - Off Milrinone gtt @ 7:30 am 11/11  - Currently on hydralazine 100 TID and ISD 40 TID for AL reduction  - [place] d/c'd due to elevated K levels  - c/w coreg 25 BID for GDMT  - Listed OHT status 2 11/22, f/u HF recs    # NSTEMI iso stent re-occlusion of pLAD and 100%  of RCA  - EKG on admission w/LBBB  - DAPT: c/w ASA, Brilinta d/c'd per transplant w/u  - c/w lipitor 80  - cMR deferred given necessity of IABP  - CT sx not recommending CABG, undergoing AT eval    # LV thrombus  - c/w heparin gtt    ===================== RENAL =========================  # Non-oliguric ARIC   - sCr 1.91 today, Baseline Cr: 1-1.22  - Renal US - no evidence of renal artery stenosis  - Trend BMP, lytes daily, replace as needed  - Continue Strict I/Os, avoid nephrotoxins    # Mild Hyponatremia/Hyperkalemia  - iso ARIC and or new CKD baseline  - s/p hypertonic saline bolus per nephro request  - Assess volume status and consider diuresis if hypervolemic   - 1.2L Fluid restriction, gtts in NS where applicable   - Low K diet       ==================== GASTROINTESTINAL===================  - c/w diet  - continue to monitor for BM  - c/w bowel regimen    ===================HEMATOLOGIC/ONC ===================  # VTE prophylaxis   - c/w heparin gtt given LV Thrombus and IABP  - H/H and platelets stable    =======================    ENDOCRINE  =====================  # Type 2 DM  - A1c 8.3, glucose   - Continue lantus, premeal, low ISS    ========================INFECTIOUS DISEASE================  # Enterococcus faecalis bacteremia  - BCx 11/17+ for enterococcus faecalis x2, Staph epi x1 (likely contaminant)- pan sensitive   - Urine cx 11/18 + enterococcus faecalis  - BCx 11/18 NGTD  - IABP site swapped to RFA 11/20  - continue vancomycin 1g q12h (11/18- stop 11/27 per ID)  - CT A/P negative for infectious pathology    # COVID, resolved  - Off airborne precautions 11/11    # Pre-transplant ID w/u   - Trend ID recs for serologies   - Colonoscopy 11/17 - normal   - Chest CT 11/17 - improved LLL aeration  - Pt. requiring vaccines prior to transplant, would initiate series accordingly  - Recieved hep A and hep B vaccines thus far     ====================ASSESSMENT ==============  58 male with HTN, CAD (s/p PCI 2008), HFrEF, CVA 2018, and T2DM presenting with chest pressure and unknown tachycardia that was shocked x1, [facility] 11/1 found to have in-stent restenosis of pLAD and  of RCA with elevated RA and PA pressures and severely decreased EF 32%. Admitted to CICU for management of cardiogenic shock and ADHF requiring IABP 11/1 -11/7, with hospital course c/b vfib arrest requiring reinsertion of IABP. Currently undergoing workup for AT evaluation with HF.    Plan:  ====================== NEUROLOGY=====================  # Anxiety  - No Seroquel/antipsychotics since vfib arrest and prolonged QTC  - Psych Eval, recommended SSRI, but pt. refused   - Psych recommending atarax PRN  - Continue to monitor mental status    # PT/Conditioning  - Continue band exercised while on bedrest s/t IABP, IS    ==================== RESPIRATORY======================  # Acute Hypoxemic Respiratory Failure  - s/p x2 intubations for cardiogenic pulm edema and the in setting of cardiac arrest, resolved - extubated 11/10  - Currently on room air with spO2 mid 90s  - Continue incentive spirometry and monitoring of sp02    # Asthma  - c/w albuterol, symbicort and spiriva  - On trelegy at home    ====================CARDIOVASCULAR====================  # Vfib arrest iso ischemia  - Lido gtt off 11/13  - PO Amio load - total of 5g per EP complete 11/17, continue PO amio  - Keep K > 4, Mag > 2.2     # Cardiogenic shock requiring IABP (11/1- 11/7, 11/9-)  - Likely 2/2 NSTEMI and ADHF  - 11/1 LHC: pLAD 100 % in-stent restenosis & mRCA, 100 %. PCWP 30. IABP placed.  - 11/1 TTE: LV dilated. EF 32 %. Regional WMAs present, mod (grade 2) LV diastolic dysfunction  - 11/2 TTE: EF 22% and + LV thrombus  - PRN Bumex IVP as needed, appears euvolemic on exam  - IABP swapped 11/20 to RFA, continue 1:1 support  - Off Milrinone gtt @ 7:30 am 11/11  - Currently on hydralazine 100 TID and ISD 40 TID for AL reduction  - [place] d/c'd due to elevated K levels  - c/w coreg 25 BID for GDMT  - Listed OHT status 2 11/22, f/u HF recs    # NSTEMI iso stent re-occlusion of pLAD and 100%  of RCA  - EKG on admission w/LBBB  - DAPT: c/w ASA, Brilinta d/c'd per transplant w/u  - c/w lipitor 80  - cMR deferred given necessity of IABP  - CT sx not recommending CABG, undergoing AT eval    # LV thrombus  - c/w heparin gtt    ===================== RENAL =========================  # Non-oliguric ARIC   - sCr 1.91 today, Baseline Cr: 1-1.22  - Renal US - no evidence of renal artery stenosis  - Trend BMP, lytes daily, replace as needed  - Continue Strict I/Os, avoid nephrotoxins    # Mild Hyponatremia/Hyperkalemia  - iso ARIC and or new CKD baseline  - s/p hypertonic saline bolus per nephro request  - Assess volume status and consider diuresis if hypervolemic   - 1.2L Fluid restriction, gtts in NS where applicable   - Low K diet       ==================== GASTROINTESTINAL===================  - c/w diet  - continue to monitor for BM  - c/w bowel regimen    ===================HEMATOLOGIC/ONC ===================  # VTE prophylaxis   - c/w heparin gtt given LV Thrombus and IABP  - H/H and platelets stable, continue to trend daily    =======================    ENDOCRINE  =====================  # Type 2 DM  - A1c 8.3, glucose   - Continue lantus, premeal, low ISS    ========================INFECTIOUS DISEASE================  # Enterococcus faecalis bacteremia  - BCx 11/17+ for enterococcus faecalis x2, Staph epi x1 (likely contaminant)- pan sensitive   - Urine cx 11/18 + enterococcus faecalis  - BCx 11/18 NGTD  - IABP site swapped to RFA 11/20  - continue vancomycin 1g q12h (11/18- stop 11/27 per ID)  - CT A/P negative for infectious pathology    # COVID, resolved  - Off airborne precautions 11/11    # Pre-transplant ID w/u   - Trend ID recs for serologies   - Colonoscopy 11/17 - normal   - Chest CT 11/17 - improved LLL aeration  - Pt. requiring vaccines prior to transplant, would initiate series accordingly  - Received hep A and hep B vaccines thus far    Ethics/Dispo: full code, maintain in [facility]    BRITANY Becerra

## 2023-11-27 NOTE — PROGRESS NOTE ADULT - SUBJECTIVE AND OBJECTIVE BOX
PATIENT:  DEVORAH VALENCIA  [id number]    CHIEF COMPLAINT:  Patient is a 58y old  Male who presents with a chief complaint of CP/SOB (2023 06:31)      INTERVAL HISTORY/OVERNIGHT EVENTS:      REVIEW OF SYSTEMS:  Constitutional:     [ ] negative [ ] fevers [ ] chills [ ] weight loss [ ] weight gain  HEENT:                  [ ] negative [ ] dry eyes [ ] eye irritation [ ] postnasal drip [ ] nasal congestion  CV:                         [ ] negative  [ ] chest pain [ ] orthopnea [ ] palpitations [ ] murmur  Resp:                     [ ] negative [ ] cough [ ] shortness of breath [ ] dyspnea [ ] wheezing [ ] sputum [ ] hemoptysis  GI:                          [ ] negative [ ] nausea [ ] vomiting [ ] diarrhea [ ] constipation [ ] abd pain [ ] dysphagia   :                        [ ] negative [ ] dysuria [ ] nocturia [ ] hematuria [ ] increased urinary frequency  Musculoskeletal: [ ] negative [ ] back pain [ ] myalgias [ ] arthralgias [ ] fracture  Skin:                       [ ] negative [ ] rash [ ] itch  Neurological:        [ ] negative [ ] headache [ ] dizziness [ ] syncope [ ] weakness [ ] numbness  Psychiatric:           [ ] negative [ ] anxiety [ ] depression  Endocrine:            [ ] negative [ ] diabetes [ ] thyroid problem  Heme/Lymph:      [ ] negative [ ] anemia [ ] bleeding problem  Allergic/Immune: [ ] negative [ ] itchy eyes [ ] nasal discharge [ ] hives [ ] angioedema    [ ] All other systems negative  [ ] Unable to assess ROS because ________.    MEDICATIONS:  MEDICATIONS  (STANDING):  aMIOdarone    Tablet   Oral   aMIOdarone    Tablet 200 milliGRAM(s) Oral daily  aspirin  chewable 81 milliGRAM(s) Oral daily  atorvastatin 80 milliGRAM(s) Oral at bedtime  budesonide  80 MICROgram(s)/formoterol 4.5 MICROgram(s) Inhaler 2 Puff(s) Inhalation two times a day  carvedilol 25 milliGRAM(s) Oral every 12 hours  chlorhexidine 2% Cloths 1 Application(s) Topical daily  dextrose 50% Injectable 12.5 Gram(s) IV Push once  dextrose 50% Injectable 25 Gram(s) IV Push once  heparin  Infusion 1600 Unit(s)/Hr (13 mL/Hr) IV Continuous <Continuous>  hydrALAZINE 100 milliGRAM(s) Oral three times a day  insulin glargine Injectable (LANTUS) 12 Unit(s) SubCutaneous at bedtime  insulin lispro (ADMELOG) corrective regimen sliding scale   SubCutaneous at bedtime  insulin lispro (ADMELOG) corrective regimen sliding scale   SubCutaneous three times a day before meals  insulin lispro Injectable (ADMELOG) 8 Unit(s) SubCutaneous three times a day before meals  isosorbide   dinitrate Tablet (ISORDIL) 40 milliGRAM(s) Oral three times a day  lactulose Syrup 20 Gram(s) Oral three times a day  polyethylene glycol 3350 17 Gram(s) Oral two times a day  senna 2 Tablet(s) Oral at bedtime  zoster Vaccine recombinant (SHINGRIX) 0.5 milliLiter(s) IntraMuscular once    MEDICATIONS  (PRN):  hydrOXYzine hydrochloride 25 milliGRAM(s) Oral two times a day PRN Anxiety      ALLERGIES:  Allergies    penicillins (Unknown)    Intolerances        OBJECTIVE:  ICU Vital Signs Last 24 Hrs  T(C): 36.8 (2023 03:00), Max: 36.8 (2023 03:00)  T(F): 98.2 (2023 03:00), Max: 98.2 (2023 03:00)  HR: 71 (2023 06:00) (66 - 84)  BP: --  BP(mean): --  ABP: --  ABP(mean): --  RR: 16 (2023 06:00) (16 - 21)  SpO2: 95% (2023 06:00) (95% - 100%)    O2 Parameters below as of 2023 06:00  Patient On (Oxygen Delivery Method): room air        Adult Advanced Hemodynamics Last 24 Hrs  CVP(mm Hg): --  CVP(cm H2O): --  CO: --  CI: --  PA: --  PA(mean): --  PCWP: --  SVR: --  SVRI: --  PVR: --  PVRI: --  CAPILLARY BLOOD GLUCOSE      POCT Blood Glucose.: 166 mg/dL (2023 22:01)  POCT Blood Glucose.: 222 mg/dL (2023 16:52)  POCT Blood Glucose.: 137 mg/dL (2023 13:57)  POCT Blood Glucose.: 171 mg/dL (2023 08:19)    CAPILLARY BLOOD GLUCOSE      POCT Blood Glucose.: 166 mg/dL (2023 22:01)    I&O's Summary    2023 07:01  -  2023 07:00  --------------------------------------------------------  IN: 1759 mL / OUT: 2350 mL / NET: -591 mL    2023 07:01  -  2023 06:39  --------------------------------------------------------  IN: 1892 mL / OUT: 2025 mL / NET: -133 mL      Daily     Daily Weight in k.8 (2023 06:00)    PHYSICAL EXAMINATION:  General: WN/WD NAD  HEENT: PERRLA, EOMI, moist mucous membranes  Neurology: A&Ox3, nonfocal, MOISE x 4  Respiratory: CTA B/L, normal respiratory effort, no wheezes, crackles, rales  CV: RRR, S1S2, no murmurs, rubs or gallops  Abdominal: Soft, NT, ND +BS, Last BM  Extremities: No edema, + peripheral pulses  Incisions:   Tubes:    LABS:                          12.2   8.93  )-----------( 186      ( 2023 03:09 )             36.6         130<L>  |  99  |  42<H>  ----------------------------<  178<H>  4.7   |  18<L>  |  1.91<H>    Ca    10.2      2023 03:09  Phos  4.2       Mg     2.0         TPro  7.7  /  Alb  4.2  /  TBili  0.4  /  DBili  x   /  AST  23  /  ALT  42  /  AlkPhos  81  27    LIVER FUNCTIONS - ( 2023 03:09 )  Alb: 4.2 g/dL / Pro: 7.7 g/dL / ALK PHOS: 81 U/L / ALT: 42 U/L / AST: 23 U/L / GGT: x           PT/INR - ( 2023 03:09 )   PT: 12.6 sec;   INR: 1.21 ratio         PTT - ( 2023 03:09 )  PTT:64.7 sec        Urinalysis Basic - ( 2023 03:09 )    Color: x / Appearance: x / SG: x / pH: x  Gluc: 178 mg/dL / Ketone: x  / Bili: x / Urobili: x   Blood: x / Protein: x / Nitrite: x   Leuk Esterase: x / RBC: x / WBC x   Sq Epi: x / Non Sq Epi: x / Bacteria: x        TELEMETRY:     EKG:     IMAGING:       PATIENT:  DEVORAH VALENCIA  [id number]    CHIEF COMPLAINT:  Patient is a 58y old  Male who presents with a chief complaint of CP/SOB (2023 06:31)      INTERVAL HISTORY/OVERNIGHT EVENTS: no acute overnight events.     REVIEW OF SYSTEMS:  Constitutional:     [x] negative [ ] fevers [ ] chills [ ] weight loss [ ] weight gain  HEENT:                  [ x] negative [ ] dry eyes [ ] eye irritation [ ] postnasal drip [ ] nasal congestion  CV:                         [x ] negative  [ ] chest pain [ ] orthopnea [ ] palpitations [ ] murmur  Resp:                     [x ] negative [ ] cough [ ] shortness of breath [ ] dyspnea [ ] wheezing [ ] sputum [ ] hemoptysis  GI:                          [x ] negative [ ] nausea [ ] vomiting [ ] diarrhea [ ] constipation [ ] abd pain [ ] dysphagia   :                        [ x] negative [ ] dysuria [ ] nocturia [ ] hematuria [ ] increased urinary frequency  Musculoskeletal: [x ] negative [ ] back pain [ ] myalgias [ ] arthralgias [ ] fracture  Skin:                       [x ] negative [ ] rash [ ] itch  Neurological:        [x ] negative [ ] headache [ ] dizziness [ ] syncope [ ] weakness [ ] numbness      [ ] All other systems negative  [ ] Unable to assess ROS because ________.    MEDICATIONS:  MEDICATIONS  (STANDING):  aMIOdarone    Tablet   Oral   aMIOdarone    Tablet 200 milliGRAM(s) Oral daily  aspirin  chewable 81 milliGRAM(s) Oral daily  atorvastatin 80 milliGRAM(s) Oral at bedtime  budesonide  80 MICROgram(s)/formoterol 4.5 MICROgram(s) Inhaler 2 Puff(s) Inhalation two times a day  carvedilol 25 milliGRAM(s) Oral every 12 hours  chlorhexidine 2% Cloths 1 Application(s) Topical daily  dextrose 50% Injectable 12.5 Gram(s) IV Push once  dextrose 50% Injectable 25 Gram(s) IV Push once  heparin  Infusion 1600 Unit(s)/Hr (13 mL/Hr) IV Continuous <Continuous>  hydrALAZINE 100 milliGRAM(s) Oral three times a day  insulin glargine Injectable (LANTUS) 12 Unit(s) SubCutaneous at bedtime  insulin lispro (ADMELOG) corrective regimen sliding scale   SubCutaneous at bedtime  insulin lispro (ADMELOG) corrective regimen sliding scale   SubCutaneous three times a day before meals  insulin lispro Injectable (ADMELOG) 8 Unit(s) SubCutaneous three times a day before meals  isosorbide   dinitrate Tablet (ISORDIL) 40 milliGRAM(s) Oral three times a day  lactulose Syrup 20 Gram(s) Oral three times a day  polyethylene glycol 3350 17 Gram(s) Oral two times a day  senna 2 Tablet(s) Oral at bedtime  zoster Vaccine recombinant (SHINGRIX) 0.5 milliLiter(s) IntraMuscular once    MEDICATIONS  (PRN):  hydrOXYzine hydrochloride 25 milliGRAM(s) Oral two times a day PRN Anxiety      ALLERGIES:  Allergies    penicillins (Unknown)    Intolerances        OBJECTIVE:  ICU Vital Signs Last 24 Hrs  T(C): 36.8 (2023 03:00), Max: 36.8 (2023 03:00)  T(F): 98.2 (2023 03:00), Max: 98.2 (2023 03:00)  HR: 71 (2023 06:00) (66 - 84)  BP: --  BP(mean): --  ABP: --  ABP(mean): --  RR: 16 (2023 06:00) (16 - 21)  SpO2: 95% (2023 06:00) (95% - 100%)    O2 Parameters below as of 2023 06:00  Patient On (Oxygen Delivery Method): room air    POCT Blood Glucose.: 166 mg/dL (2023 22:01)  POCT Blood Glucose.: 222 mg/dL (2023 16:52)  POCT Blood Glucose.: 137 mg/dL (2023 13:57)  POCT Blood Glucose.: 171 mg/dL (2023 08:19)    CAPILLARY BLOOD GLUCOSE      POCT Blood Glucose.: 166 mg/dL (2023 22:01)    I&O's Summary    2023 07:01  -  2023 07:00  --------------------------------------------------------  IN: 1759 mL / OUT: 2350 mL / NET: -591 mL    2023 07:01  -  2023 06:39  --------------------------------------------------------  IN: 1892 mL / OUT: 5 mL / NET: -133 mL      Daily     Daily Weight in k.8 (2023 06:00)    PHYSICAL EXAMINATION:  General: WN/WD NAD  HEENT: PERRLA, EOMI, moist mucous membranes  Neurology: A&Ox3, nonfocal, MOISE x 4  Respiratory: CTA B/L, normal respiratory effort, no wheezes, crackles, rales  CV: RRR, S1S2, no murmurs, rubs or gallops  Abdominal: Soft, NT, ND +BS,   Extremities: No edema, + palpable radial and DP pulses  Skin: warm and dry  Lines: R femoral IABP dressing clean, dry and intact    LABS:                          12.2   8.93  )-----------( 186      ( 2023 03:09 )             36.6     11-    130<L>  |  99  |  42<H>  ----------------------------<  178<H>  4.7   |  18<L>  |  1.91<H>    Ca    10.2      2023 03:09  Phos  4.2       Mg     2.0         TPro  7.7  /  Alb  4.2  /  TBili  0.4  /  DBili  x   /  AST  23  /  ALT  42  /  AlkPhos  81  27    LIVER FUNCTIONS - ( 2023 03:09 )  Alb: 4.2 g/dL / Pro: 7.7 g/dL / ALK PHOS: 81 U/L / ALT: 42 U/L / AST: 23 U/L / GGT: x           PT/INR - ( 2023 03:09 )   PT: 12.6 sec;   INR: 1.21 ratio         PTT - ( 2023 03:09 )  PTT:64.7 sec        Urinalysis Basic - ( 2023 03:09 )    Color: x / Appearance: x / SG: x / pH: x  Gluc: 178 mg/dL / Ketone: x  / Bili: x / Urobili: x   Blood: x / Protein: x / Nitrite: x   Leuk Esterase: x / RBC: x / WBC x   Sq Epi: x / Non Sq Epi: x / Bacteria: x        TELEMETRY: reviewed    EKG: reviewed    IMAGING: reviewed

## 2023-11-27 NOTE — PROGRESS NOTE ADULT - SUBJECTIVE AND OBJECTIVE BOX
DEVORAH VALENCIA  MRN-[id number]  Patient is a 58y old  Male who presents with a chief complaint of pre advanced cardiac therapies (2023 17:10)    HPI:  pt seen and approx 1:30 pm  in [facility]    57yo M w/ hx HTN, CAD w/ 1 stent in , ICH () presenting with abn ekg. Patient presented to [facility] where he was found to have STEMI, recommended to get cath however patient did not want to get it there so it left and came here.  Patient initially had cough, congestion, fever, was placed on antibiotics on .  Started feeling nauseous and had a presyncopal event after which he presented to ED last night.  Had chest pain as well.  Chest pain is midsternal.  Not currently having chest pain.  Received 4 aspirin 30 min pta. (2023 15:11)      Hospital Course:    24 HOUR EVENTS:    REVIEW OF SYSTEMS:    CONSTITUTIONAL: No weakness, fevers or chills  EYES/ENT: No visual changes;  No vertigo or throat pain   NECK: No pain or stiffness  RESPIRATORY: No cough, wheezing, hemoptysis; No shortness of breath  CARDIOVASCULAR: No chest pain or palpitations  GASTROINTESTINAL: No abdominal or epigastric pain. No nausea, vomiting, or hematemesis; No diarrhea or constipation. No melena or hematochezia.  GENITOURINARY: No dysuria, frequency or hematuria  NEUROLOGICAL: No numbness or weakness  SKIN: No itching, rashes      ICU Vital Signs Last 24 Hrs  T(C): 36.6 (2023 17:00), Max: 36.8 (2023 03:00)  T(F): 97.9 (2023 17:00), Max: 98.3 (2023 08:00)  HR: 81 (2023 20:00) (67 - 81)  BP: --  BP(mean): --  ABP: --  ABP(mean): --  RR: 20 (2023 20:00) (15 - 29)  SpO2: 98% (2023 20:00) (95% - 100%)    O2 Parameters below as of 2023 19:00  Patient On (Oxygen Delivery Method): room air            CVP(mm Hg): --  CO: --  CI: --  PA: --  PA(mean): --  PA(direct): --  PCWP: --  LA: --  RA: --  SVR: --  SVRI: --  PVR: --  PVRI: --  I&O's Summary    2023 07:  -  2023 07:00  --------------------------------------------------------  IN: 1892 mL / OUT: 5 mL / NET: -133 mL    2023 07:  -  2023 20:46  --------------------------------------------------------  IN: 1119 mL / OUT: 1325 mL / NET: -206 mL        CAPILLARY BLOOD GLUCOSE    CAPILLARY BLOOD GLUCOSE      POCT Blood Glucose.: 164 mg/dL (2023 17:28)      PHYSICAL EXAM:  GENERAL: No acute distress, well-developed  HEAD:  Atraumatic, Normocephalic  EYES: EOMI, PERRLA, conjunctiva and sclera clear  NECK: Supple, no lymphadenopathy, no JVD  CHEST/LUNG: CTAB; No wheezes, rales, or rhonchi  HEART: Regular rate and rhythm. Normal S1/S2. No murmurs, rubs, or gallops  ABDOMEN: Soft, non-tender, non-distended; normal bowel sounds, no organomegaly  EXTREMITIES:  2+ peripheral pulses b/l, No clubbing, cyanosis, or edema  NEUROLOGY: A&O x 3, no focal deficits  SKIN: No rashes or lesions    ============================I/O===========================   I&O's Detail    2023 07:  -  2023 07:00  --------------------------------------------------------  IN:    Heparin: 312 mL    IV PiggyBack: 500 mL    Oral Fluid: 1080 mL  Total IN: 1892 mL    OUT:    Voided (mL): 2025 mL  Total OUT: 2025 mL    Total NET: -133 mL      2023 07:01  -  2023 20:46  --------------------------------------------------------  IN:    Heparin: 169 mL    IV PiggyBack: 100 mL    Oral Fluid: 600 mL    Sodium Chloride 0.9% Bolus: 250 mL  Total IN: 1119 mL    OUT:    Voided (mL): 1325 mL  Total OUT: 1325 mL    Total NET: -206 mL        ============================ LABS =========================                        12.2   8.93  )-----------( 186      ( 2023 03:09 )             36.6     -    127<L>  |  96  |  38<H>  ----------------------------<  176<H>  4.7   |  17<L>  |  1.50<H>    Ca    9.7      2023 15:08  Phos  4.4       Mg     2.0         TPro  7.7  /  Alb  3.8  /  TBili  0.4  /  DBili  x   /  AST  29  /  ALT  47<H>  /  AlkPhos  80                  LIVER FUNCTIONS - ( 2023 15:08 )  Alb: 3.8 g/dL / Pro: 7.7 g/dL / ALK PHOS: 80 U/L / ALT: 47 U/L / AST: 29 U/L / GGT: x           PT/INR - ( 2023 03:09 )   PT: 12.6 sec;   INR: 1.21 ratio         PTT - ( 2023 03:09 )  PTT:64.7 sec    Lactate, Blood: 1.4 mmol/L (23 @ 03:09)  Lactate, Blood: 1.4 mmol/L (23 @ 04:30)  Blood Gas Venous - Lactate: 1.4 mmol/L (23 @ 16:07)    Urinalysis Basic - ( 2023 15:08 )    Color: x / Appearance: x / SG: x / pH: x  Gluc: 176 mg/dL / Ketone: x  / Bili: x / Urobili: x   Blood: x / Protein: x / Nitrite: x   Leuk Esterase: x / RBC: x / WBC x   Sq Epi: x / Non Sq Epi: x / Bacteria: x      ======================Micro/Rad/Cardio=================  Telemtry: Reviewed   EKG: Reviewed  CXR: Reviewed  Culture: Reviewed   Echo:   Cath: Cardiac Cath Lab - Adult:   [facility]  Department of Cardiology  [address] [place] [address]  [phone]  Cath Lab Report -- Comprehensive Report  Patient: LD VALENCIA  Study date: 2017  Account number: [id number]  MR number: [id number]  : 1965  Gender: Male  Race: W  Case Physician(s):  Jairon Holguin M.D.  Referring Physician:  Luc Lynn M.D.  INDICATIONS: Unstable angina - CCS4.  HISTORY: The patient has a history of coronary artery disease. The patient  hashypertension and medication-treated dyslipidemia.  PROCEDURE:  --  Left heart catheterization with ventriculography.  --  Left coronary angiography.  --  Right coronary angiography.  TECHNIQUE: The risks and alternatives of the procedures and conscious  sedation were explained to the patient and informed consent was obtained.  Cardiac catheterization performed electively.  Local anesthetic given. Right radial artery access. A 6FR PRELUDE KIT was  inserted in the vessel. Left heart catheterization. Ventriculography was  performed. A 5FR FR4.0 EXPO catheter was utilized. Left coronary artery  angiography. The vessel was injected utilizing a 5FR FL3.5 EXPO catheter.  Right coronary artery angiography. The vessel was injected utilizing a 5FR  FR4.0 EXPO catheter. RADIATION EXPOSURE: 1.1 min.  CONTRAST GIVEN: Omnipaque 55 ml.  MEDICATIONS GIVEN: Midazolam, 1 mg, IV. Fentanyl, 25 mcg, IV. Verapamil  (Isoptin, Calan, Covera), 2.5 mg, IA. Heparin, 3000 units, IA.  VENTRICLES: Global left ventricular function was moderately depressed. EF  estimated was 40 %.  CORONARY VESSELS: The coronary circulation is right dominant.  LM:   --  LM: Normal.  LAD:   --  Proximal LAD: There was a 50 % stenosis.  CX:   --  Circumflex: Normal.  RCA:   --  Mid RCA: There was a 40 % stenosis.  --  Distal RCA: There was a 50 % stenosis.  COMPLICATIONS: There were no complications.  DIAGNOSTIC RECOMMENDATIONS: The patient should continue with the present  medications.  Prepared and signed by  Jairon Holguin M.D.  Signed 2017 12:20:13  HEMODYNAMIC TABLES  Pressures:  Baseline/ Room Air  Pressures:  - HR: 78  Pressures:  - Rhythm:  Pressures:  -- Aortic Pressure (S/D/M): --/--/99  Pressures:  -- Left Ventricle (s/edp): 157/39/--  Outputs:  Baseline/ Room Air  Outputs:  -- CALCULATIONS: Age in years: 51.41  Outputs:  -- CALCULATIONS: Body Surface Area: 2.05  Outputs:  -- CALCULATIONS: Height in cm: 175.00  Outputs:  -- CALCULATIONS: Sex: Male  Outputs:  -- CALCULATIONS: Weight in k.40 (17 @ 21:55)    ======================================================  PAST MEDICAL & SURGICAL HISTORY:  HTN (hypertension)      CAD (coronary artery disease)  ; stent      Intracranial hemorrhage        Respiratory arrest        Myocardial infarction, unspecified MI type, unspecified artery      History of coronary artery stent placement  ====================ASSESSMENT ==============  58 male with HTN, CAD (s/p PCI ), HFrEF, CVA , and T2DM presenting with chest pressure and unknown tachycardia that was shocked x1, [facility]  found to have in-stent restenosis of pLAD and  of RCA with elevated RA and PA pressures and severely decreased EF 32%. Admitted to CICU for management of cardiogenic shock and ADHF requiring IABP  -, with hospital course c/b vfib arrest requiring reinsertion of IABP. Currently undergoing workup for AT evaluation with HF.    Plan:  ====================== NEUROLOGY=====================  # Anxiety  - No Seroquel/antipsychotics since vfib arrest and prolonged QTC  - Psych Eval, recommended SSRI, but pt. refused   - Psych recommending atarax PRN  - Continue to monitor mental status    # PT/Conditioning  - Continue band exercised while on bedrest s/t IABP, IS    ==================== RESPIRATORY======================  # Acute Hypoxemic Respiratory Failure  - s/p x2 intubations for cardiogenic pulm edema and the in setting of cardiac arrest, resolved - extubated 11/10  - Currently on room air with spO2 mid 90s  - Continue incentive spirometry and monitoring of sp02    # Asthma  - c/w albuterol, symbicort and spiriva  - On trelegy at home    ====================CARDIOVASCULAR====================  # Vfib arrest iso ischemia  - Lido gtt off   - PO Amio load - total of 5g per EP complete , continue PO amio  - Keep K > 4, Mag > 2.2     # Cardiogenic shock requiring IABP (- , -)  - Likely 2/2 NSTEMI and ADHF  -  [facility]: pLAD 100 % in-stent restenosis & mRCA, 100 %. PCWP 30. IABP placed.  -  TTE: LV dilated. EF 32 %. Regional WMAs present, mod (grade 2) LV diastolic dysfunction  -  TTE: EF 22% and + LV thrombus  - PRN Bumex IVP as needed, appears euvolemic on exam  - IABP swapped  to RFA, continue 1:1 support  - Off Milrinone gtt @ 7:30 am   - Currently on hydralazine 100 TID and ISD 40 TID for AL reduction  - James d/c'd due to elevated K levels  - c/w coreg 25 BID for GDMT  - Listed OHT status 2 , f/u HF recs    # NSTEMI iso stent re-occlusion of pLAD and 100%  of RCA  - EKG on admission w/LBBB  - DAPT: c/w ASA, Brilinta d/c'd per transplant w/u  - c/w lipitor 80  - cMR deferred given necessity of IABP  - CT sx not recommending CABG, undergoing AT eval    # LV thrombus  - c/w heparin gtt    ===================== RENAL =========================  # Non-oliguric ARIC   - sCr 1.91 today, Baseline Cr: 1-1.22  - Renal US - no evidence of renal artery stenosis  - Trend BMP, lytes daily, replace as needed  - Continue Strict I/Os, avoid nephrotoxins    # Mild Hyponatremia/Hyperkalemia  - iso ARIC and or new CKD baseline  - s/p hypertonic saline bolus per nephro request  - Assess volume status and consider diuresis if hypervolemic   - 1.2L Fluid restriction, gtts in NS where applicable   - Low K diet       ==================== GASTROINTESTINAL===================  - c/w diet  - continue to monitor for BM  - c/w bowel regimen    ===================HEMATOLOGIC/ONC ===================  # VTE prophylaxis   - c/w heparin gtt given LV Thrombus and IABP  - H/H and platelets stable, continue to trend daily    =======================    ENDOCRINE  =====================  # Type 2 DM  - A1c 8.3, glucose   - Continue lantus, premeal, low ISS    ========================INFECTIOUS DISEASE================  # Enterococcus faecalis bacteremia  - BCx + for enterococcus faecalis x2, Staph epi x1 (likely contaminant)- pan sensitive   - Urine cx  + enterococcus faecalis  - BCx  NGTD  - IABP site swapped to RFA   - continue vancomycin 1g q12h (- stop  per ID)  - CT A/P negative for infectious pathology    # COVID, resolved  - Off airborne precautions     # Pre-transplant ID w/u   - Trend ID recs for serologies   - Colonoscopy  - normal   - Chest CT  - improved LLL aeration  - Pt. requiring vaccines prior to transplant, would initiate series accordingly  - Received hep A and hep B vaccines thus far    Patient requires continuous monitoring with bedside rhythm monitoring, pulse ox monitoring, and intermittent blood gas analysis. Care plan discussed with ICU care team. Patient remained critical and at risk for life threatening decompensation.  Patient seen, examined and plan discussed with CCU team during rounds.     I have personally provided ____ minutes of critical care time excluding time spent on separate procedures, in addition to initial critical care time provided by the CICU Attending, Dr. Lees.    By signing my name below, I, Kalyn Sousa, attest that this documentation has been prepared under the direction and in the presence of KARYN Washburn.  Electronically signed: Rosalba Booth, 23 @ 20:46    I, Laura Mcclendon , personally performed the services described in this documentation. all medical record entries made by the eileenibe were at my direction and in my presence. I have reviewed the chart and agree that the record reflects my personal performance and is accurate and complete  Electronically signed: KARYN Washburn.       DEVORAH VALENCIA  MRN-[id number]  Patient is a 58y old  Male who presents with a chief complaint of pre advanced cardiac therapies (2023 17:10)    HPI:  57yo M w/ hx HTN, CAD w/ 1 stent in , ICH () presenting with abn ekg. Patient presented to [facility] where he was found to have STEMI, recommended to get cath however patient did not want to get it there so it left and came here.  Patient initially had cough, congestion, fever, was placed on antibiotics on .  Started feeling nauseous and had a presyncopal event after which he presented to ED last night.  Had chest pain as well.  Chest pain is midsternal.  Not currently having chest pain.  Received 4 aspirin 30 min pta. (2023 15:11)    24 HOUR EVENTS:  - 1 BM  - complaining of diffuse abd pain    ICU Vital Signs Last 24 Hrs  T(C): 36.6 (2023 17:00), Max: 36.8 (2023 03:00)  T(F): 97.9 (2023 17:00), Max: 98.3 (2023 08:00)  HR: 81 (2023 20:00) (67 - 81)  RR: 20 (2023 20:00) (15 - 29)  SpO2: 98% (2023 20:00) (95% - 100%)    O2 Parameters below as of 2023 19:00  Patient On (Oxygen Delivery Method): room air    I&O's Summary    2023 07:  -  2023 07:00  --------------------------------------------------------  IN: 1892 mL / OUT: 5 mL / NET: -133 mL    2023 07:01  -  2023 20:46  --------------------------------------------------------  IN: 1119 mL / OUT: 1325 mL / NET: -206 mL      CAPILLARY BLOOD GLUCOSE  POCT Blood Glucose.: 164 mg/dL (2023 17:28)    PHYSICAL EXAM:  GENERAL: NAD, complaining of diffuse abd pain  CHEST/LUNG: CTAB  HEART: Regular rate and rhythm. Normal S1/S2  ABDOMEN: firm, distended, pain with palpation, +hypoactive BS  EXTREMITIES:  2+ peripheral pulses b/l, No clubbing, cyanosis, or edema  NEUROLOGY: A&O x 3    ============================I/O===========================   I&O's Detail    2023 07:01  -  2023 07:00  --------------------------------------------------------  IN:    Heparin: 312 mL    IV PiggyBack: 500 mL    Oral Fluid: 1080 mL  Total IN: 1892 mL    OUT:    Voided (mL): 5 mL  Total OUT:  mL    Total NET: -133 mL      2023 07:01  -  2023 20:46  --------------------------------------------------------  IN:    Heparin: 169 mL    IV PiggyBack: 100 mL    Oral Fluid: 600 mL    Sodium Chloride 0.9% Bolus: 250 mL  Total IN: 1119 mL    OUT:    Voided (mL): 1325 mL  Total OUT: 1325 mL    Total NET: -206 mL    ============================ LABS =========================                        12.2   8.93  )-----------( 186      ( 2023 03:09 )             36.6     11-27    127<L>  |  96  |  38<H>  ----------------------------<  176<H>  4.7   |  17<L>  |  1.50<H>    Ca    9.7      2023 15:08  Phos  4.4       Mg     2.0         TPro  7.7  /  Alb  3.8  /  TBili  0.4  /  DBili  x   /  AST  29  /  ALT  47<H>  /  AlkPhos  80      LIVER FUNCTIONS - ( 2023 15:08 )  Alb: 3.8 g/dL / Pro: 7.7 g/dL / ALK PHOS: 80 U/L / ALT: 47 U/L / AST: 29 U/L / GGT: x           PT/INR - ( 2023 03:09 )   PT: 12.6 sec;   INR: 1.21 ratio         PTT - ( 2023 03:09 )  PTT:64.7 sec    Lactate, Blood: 1.4 mmol/L (23 @ 03:09)  Lactate, Blood: 1.4 mmol/L (23 @ 04:30)  Blood Gas Venous - Lactate: 1.4 mmol/L (23 @ 16:07)    Urinalysis Basic - ( 2023 15:08 )    Color: x / Appearance: x / SG: x / pH: x  Gluc: 176 mg/dL / Ketone: x  / Bili: x / Urobili: x   Blood: x / Protein: x / Nitrite: x   Leuk Esterase: x / RBC: x / WBC x   Sq Epi: x / Non Sq Epi: x / Bacteria: x    ======================Micro/Rad/Cardio=================  Telemtry: Reviewed   EKG: Reviewed  CXR: Reviewed  Culture: Reviewed   Echo:   Cath: Cardiac Cath Lab - Adult:   [facility]  Department of Cardiology  [address] [place] [address]  [phone]  Cath Lab Report -- Comprehensive Report  Patient: LD VALENCIA  Study date: 2017  Account number: [id number]  MR number: [id number]  : 1965  Gender: Male  Race: W  Case Physician(s):  Jairon Holguin M.D.  Referring Physician:  Luc Lynn M.D.  INDICATIONS: Unstable angina - CCS4.  HISTORY: The patient has a history of coronary artery disease. The patient  hashypertension and medication-treated dyslipidemia.  PROCEDURE:  --  Left heart catheterization with ventriculography.  --  Left coronary angiography.  --  Right coronary angiography.  TECHNIQUE: The risks and alternatives of the procedures and conscious  sedation were explained to the patient and informed consent was obtained.  Cardiac catheterization performed electively.  Local anesthetic given. Right radial artery access. A 6FR PRELUDE KIT was  inserted in the vessel. Left heart catheterization. Ventriculography was  performed. A 5FR FR4.0 EXPO catheter was utilized. Left coronary artery  angiography. The vessel was injected utilizing a 5FR FL3.5 EXPO catheter.  Right coronary artery angiography. The vessel was injected utilizing a 5FR  FR4.0 EXPO catheter. RADIATION EXPOSURE: 1.1 min.  CONTRAST GIVEN: Omnipaque 55 ml.  MEDICATIONS GIVEN: Midazolam, 1 mg, IV. Fentanyl, 25 mcg, IV. Verapamil  (Isoptin, Calan, Covera), 2.5 mg, IA. Heparin, 3000 units, IA.  VENTRICLES: Global left ventricular function was moderately depressed. EF  estimated was 40 %.  CORONARY VESSELS: The coronary circulation is right dominant.  LM:   --  LM: Normal.  LAD:   --  Proximal LAD: There was a 50 % stenosis.  CX:   --  Circumflex: Normal.  RCA:   --  Mid RCA: There was a 40 % stenosis.  --  Distal RCA: There was a 50 % stenosis.  COMPLICATIONS: There were no complications.  DIAGNOSTIC RECOMMENDATIONS: The patient should continue with the present  medications.  Prepared and signed by  Jairon Holguin M.D.  Signed 2017 12:20:13  HEMODYNAMIC TABLES  Pressures:  Baseline/ Room Air  Pressures:  - HR: 78  Pressures:  - Rhythm:  Pressures:  -- Aortic Pressure (S/D/M): --/--/99  Pressures:  -- Left Ventricle (s/edp): 157/39/--  Outputs:  Baseline/ Room Air  Outputs:  -- CALCULATIONS[age]  Outputs:  -- CALCULATIONS: Body Surface Area: 2.05  Outputs:  -- CALCULATIONS: Height in cm: 175.00  Outputs:  -- CALCULATIONS: Sex: Male  Outputs:  -- CALCULATIONS: Weight in k.40 (17 @ 21:55)    ======================================================  PAST MEDICAL & SURGICAL HISTORY:  HTN (hypertension)      CAD (coronary artery disease)  ; stent      Intracranial hemorrhage        Respiratory arrest        Myocardial infarction, unspecified MI type, unspecified artery      History of coronary artery stent placement    ====================ASSESSMENT ==============  58 male with HTN, CAD (s/p PCI ), HFrEF, CVA , and T2DM presenting with chest pressure and unknown tachycardia that was shocked x1, [facility]  found to have in-stent restenosis of pLAD and  of RCA with elevated RA and PA pressures and severely decreased EF 32%. Admitted to CICU for management of cardiogenic shock and ADHF requiring IABP  -, with hospital course c/b vfib arrest requiring reinsertion of IABP. Currently undergoing workup for AT evaluation with HF.    Plan:  ====================== NEUROLOGY=====================  # Anxiety  - No Seroquel/antipsychotics since vfib arrest and prolonged QTC  - Psych Eval, recommended SSRI, but pt. refused   - Psych recommending atarax PRN  - Continue to monitor mental status    # PT/Conditioning  - Continue band exercised while on bedrest s/t IABP, IS    ==================== RESPIRATORY======================  # Acute Hypoxemic Respiratory Failure  - s/p x2 intubations for cardiogenic pulm edema and the in setting of cardiac arrest, resolved - extubated 11/10  - Currently on room air with spO2 mid 90s  - Continue incentive spirometry and monitoring of sp02    # Asthma  - c/w albuterol, symbicort and spiriva  - On trelegy at home    ====================CARDIOVASCULAR====================  # Vfib arrest iso ischemia  - Lido gtt off   - PO Amio load - total of 5g per EP complete , continue PO amio  - Keep K > 4, Mag > 2.2     # Cardiogenic shock requiring IABP (- , -)  - Likely 2/2 NSTEMI and ADHF  -  LHC: pLAD 100 % in-stent restenosis & mRCA, 100 %. PCWP 30. IABP placed.  -  TTE: LV dilated. EF 32 %. Regional WMAs present, mod (grade 2) LV diastolic dysfunction  -  TTE: EF 22% and + LV thrombus  - PRN Bumex IVP as needed, appears euvolemic on exam  - IABP swapped  to RFA, continue 1:1 support  - Off Milrinone gtt @ 7:30 am   - Currently on hydralazine 100 TID and ISD 40 TID for AL reduction  - James d/c'd due to elevated K levels  - c/w coreg 25 BID for GDMT  - Listed OHT status 2 , f/u HF recs    # NSTEMI iso stent re-occlusion of pLAD and 100%  of RCA  - EKG on admission w/LBBB  - DAPT: c/w ASA, Brilinta d/c'd per transplant w/u  - c/w lipitor 80  - cMR deferred given necessity of IABP  - CT sx not recommending CABG, undergoing AT eval    # LV thrombus  - c/w heparin gtt    ===================== RENAL =========================  # Non-oliguric ARIC   - sCr 1.91 today, Baseline Cr: 1-1.22  - Renal US - no evidence of renal artery stenosis  - Trend BMP, lytes daily, replace as needed  - Continue Strict I/Os, avoid nephrotoxins    # Mild Hyponatremia/Hyperkalemia  - iso ARIC and or new CKD baseline  - s/p hypertonic saline bolus per nephro request  - Assess volume status and consider diuresis if hypervolemic   - 1.2L Fluid restriction, gtts in NS where applicable   - Low K diet     ==================== GASTROINTESTINAL===================  Constipation  - repeat ABD XR ordered  - consider CT abd, NGT, & gen surg c/s if no improvement to r/o SBO  - pt reporting episodes of nausea without vomiting that comes and goes in waves  - pt refusing nausea medication at this time  - c/w diet  - continue to monitor for BM  - c/w bowel regimen    ===================HEMATOLOGIC/ONC ===================  # VTE prophylaxis   - c/w heparin gtt given LV Thrombus and IABP  - H/H and platelets stable, continue to trend daily    =======================    ENDOCRINE  =====================  # Type 2 DM  - A1c 8.3, glucose   - Continue lantus, premeal, low ISS    ========================INFECTIOUS DISEASE================  # Enterococcus faecalis bacteremia  - BCx + for enterococcus faecalis x2, Staph epi x1 (likely contaminant)- pan sensitive   - Urine cx  + enterococcus faecalis  - BCx  NGTD  - IABP site swapped to RFA   - continue vancomycin 1g q12h (- stop  per ID)  - CT A/P negative for infectious pathology    # COVID, resolved  - Off airborne precautions     # Pre-transplant ID w/u   - Trend ID recs for serologies   - Colonoscopy  - normal   - Chest CT  - improved LLL aeration  - Pt. requiring vaccines prior to transplant, would initiate series accordingly  - Received hep A and hep B vaccines thus far    Patient requires continuous monitoring with bedside rhythm monitoring, pulse ox monitoring, and intermittent blood gas analysis. Care plan discussed with ICU care team. Patient remained critical and at risk for life threatening decompensation.  Patient seen, examined and plan discussed with CCU team during rounds.     I have personally provided 30 minutes of critical care time excluding time spent on separate procedures, in addition to initial critical care time provided by the CICU Attending, Dr. Lees.    By signing my name below, I, Kalyn Sousa, attest that this documentation has been prepared under the direction and in the presence of KARYN Washburn.  Electronically signed: Rosalba Booth, 23 @ 20:46    I, Laura Mcclendon , personally performed the services described in this documentation. all medical record entries made by the eileenibmartha were at my direction and in my presence. I have reviewed the chart and agree that the record reflects my personal performance and is accurate and complete  Electronically signed: KARYN Washburn.

## 2023-11-27 NOTE — PROGRESS NOTE ADULT - PROBLEM SELECTOR PLAN 1
- L IABP at 1:1, placed 11/9. On AC with Heparin infusion (also for LV thrombus); s/p exchange to LFA given high grade bacteremia on 11/20  - Currently on Coreg 25 mg BID hold for MAP <65  - Continue HDZN 100 mg TID and ISDN 40 mg TID, hold for MAP <65  - Continue Spironolactone 25 mg QD  - PRN diuretics to target net even/- 500 fluid balance; Would give x1 dose 250ml fluid repletion today and then repeat chemistries this after noon  - AT evaluation: launched 11/10. Accepted for transplant, currently listed UNOS Status 2. ABO A.   -cPRA 0% 11/13  - Last Brilinta dose was on 11/13  - Please keep primary Dr. Banuelos [phone] in the loop per family request. - L IABP at 1:1, placed 11/9. On AC with Heparin infusion (also for LV thrombus); s/p exchange to LFA given high grade bacteremia on 11/20  - Currently on Coreg 25 mg BID hold for MAP <65  - Continue HDZN 100 mg TID and ISDN 40 mg TID, hold for MAP <65  - Continue Spironolactone 25 mg QD  - PRN diuretics to target net even/- 500 fluid balance; Would give x1 dose 250ml fluid repletion today and then repeat chemistries this afternoon  - AT evaluation: launched 11/10. Accepted for transplant, currently listed UNOS Status 2. ABO A.   -cPRA 0% 11/13  - Last Brilinta dose was on 11/13  - Please keep primary Dr. Banuelos [phone] in the loop per family request.

## 2023-11-27 NOTE — PROGRESS NOTE ADULT - ASSESSMENT
59 yo M with HFrEF (LVIDd 6.4 cm, LVEF 32%), CAD s/p PCI (2008), HTN, DMT2 (A1c 8.3%) and CVA s/p TPA (2018), recently treated for PNA who initially presented to [facility] via EMS after syncope reportedly requiring defibrilation. Treated for ACS but left AMA to come to [facility]. On arrival ECG with ST depression in lateral leads. Intubated 11/1 for respiratory failure. Found to have COVID. L/RHC 11/1revealing  of LAD and RCA, elevated filling pressures and CI of 1.5 prompting placement of IABP. Extubated 11/3. IABP was weaned to off 11/7, then the following day on 11/8, developed PMVT cardiac arrest with prompt CPR and defibrillation, started on IV Lidocaine and Amio. IABP ultimately replaced on 11/9 for worsening hypotension. TTE 11/11 revealing LV thrombus.     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 AT evaluation launched 11/10, he is ABO A. Currently well supported on IABP at 1:1 without further arrhythmias.  He was discussed during TSC on 11/16 and was approved for listing, however was found to be Bacteremic with 11/17 Blc Cx growing mixed cultures Staph epi and Enterococcus faecalis and started on IV Vanco. Repeat cultures from 11/18 reveal NGTD and therefore was cleared by ID for listing. He's currently afebrile with downtrended, now plateaued mild leukocytosis on vanc. He is hemodynamically stable with MAPS 70-80s on IABP 1:1 on high dose oral vaosdilators/GDMT. Labs today notable for bump in his Cr to 1.9 from 1.5 and he appears low euvolemic on exam. He was listed on 11/22 UNOS Status 2, he is ABO, PRA's 0. Awaiting suitable donor.       Cardiac Studies  11/21/23 TTE: limited unable to rule out endocarditis, technically difficult study  11/1123 TTE: LVEF 22% (global), LV thrombus, normal RV size and function, mild MR, trace TR  11/1/23  LHC showed pLAD 70 % stenosis in the ostium portion of the segment and 100 % in-stent restenosis and in mRCA, 100 % stenosis.   11/1/23 RHC; RA 23 Vw 24, PA 52/33/40, PCWP 30 Vw 33, AO 85/66/73, PA sat 54.4%, CO/CI (F) 2.96/1.44, IABP was placed during the cath through R common femoral artery.   11/1/23 TTE: LVIDd 6.4cm , LVEF 32%, regional WMA, grade II DD,  TAPSE 1.7cm, mld MR, trace TR,     Bedside hemodynamics  11/25 IABP 1:1: CVP 3, PA  33-36/12 PCWP 15-16, MVO2 72.0%, CO/CI 6.2/2.8,    11/3: IABP 1:1 RA 5; PA 27/12/18; CO/CI 5.6/2.6; MAP 90-100s.  11/4/23 IABP 1:3 CVP 4 PA 37/18 SvO2 83.8 CO/CI 11.2 CI 5.1  (in the setting of fever to 38.3C)  11/5 IABP 1:1 CVP 3 PA 48/27 SvO2 78.4% CO 8.2 CI 3.7   11/1 (IABP 1:1): CVP 1, PA 33/5/16, MvO2 74.3% 57 yo M with HFrEF (LVIDd 6.4 cm, LVEF 32%), CAD s/p PCI (2008), HTN, DMT2 (A1c 8.3%) and CVA s/p TPA (2018), recently treated for PNA who initially presented to [facility] via EMS after syncope reportedly requiring defibrilation. Treated for ACS but left AMA to come to [facility]. On arrival ECG with ST depression in lateral leads. Intubated 11/1 for respiratory failure. Found to have COVID. L/RHC 11/1revealing  of LAD and RCA, elevated filling pressures and CI of 1.5 prompting placement of IABP. Extubated 11/3. IABP was weaned to off 11/7, then the following day on 11/8, developed PMVT cardiac arrest with prompt CPR and defibrillation, started on IV Lidocaine and Amio. IABP ultimately replaced on 11/9 for worsening hypotension. TTE 11/11 revealing LV thrombus.     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 AT evaluation launched 11/10, he is ABO A. Currently well supported on IABP at 1:1 without further arrhythmias.  He was discussed during TSC on 11/16 and was approved for listing, however was found to be Bacteremic with 11/17 Blc Cx growing mixed cultures Staph epi and Enterococcus faecalis and started on IV Vanco. Repeat cultures from 11/18 reveal NGTD and therefore was cleared by ID for listing. He's currently afebrile with downtrended, now plateaued mild leukocytosis on vanc. He is hemodynamically stable with MAPS 70-80s on IABP 1:1 on high dose oral vaosdilators/GDMT. Labs today notable for bump in his Cr to 1.9 from 1.5 and he appears low euvolemic on exam. He was listed on 11/22 UNOS Status 2, he is ABO A, PRA's 0. Awaiting suitable donor.       Cardiac Studies  11/21/23 TTE: limited unable to rule out endocarditis, technically difficult study  11/1123 TTE: LVEF 22% (global), LV thrombus, normal RV size and function, mild MR, trace TR  11/1/23  LHC showed pLAD 70 % stenosis in the ostium portion of the segment and 100 % in-stent restenosis and in mRCA, 100 % stenosis.   11/1/23 RHC; RA 23 Vw 24, PA 52/33/40, PCWP 30 Vw 33, AO 85/66/73, PA sat 54.4%, CO/CI (F) 2.96/1.44, IABP was placed during the cath through R common femoral artery.   11/1/23 TTE: LVIDd 6.4cm , LVEF 32%, regional WMA, grade II DD,  TAPSE 1.7cm, mld MR, trace TR,     Bedside hemodynamics  11/25 IABP 1:1: CVP 3, PA  33-36/12 mean PA 20-23 PCWP 15-16, MVO2 72.0%, CO/CI 6.2/2.8,   dsc? (PVR 0.8-1.1 medina calculated by me)   11/3: IABP 1:1 RA 5; PA 27/12/18; CO/CI 5.6/2.6; MAP 90-100s.  11/4/23 IABP 1:3 CVP 4 PA 37/18 SvO2 83.8 CO/CI 11.2 CI 5.1  (in the setting of fever to 38.3C)  11/5 IABP 1:1 CVP 3 PA 48/27 SvO2 78.4% CO 8.2 CI 3.7   11/1 (IABP 1:1): CVP 1, PA 33/5/16, MvO2 74.3%

## 2023-11-27 NOTE — PROGRESS NOTE ADULT - SUBJECTIVE AND OBJECTIVE BOX
LD VALENCIA  58y Male  MRN:[id number]    Patient is a 58y old  Male who presents with a chief complaint of NSTEMI (01 Nov 2023 20:29)    HPI:  59yo M w/ hx HTN, CAD w/ 1 stent in 2009, ICH (2008) presenting with abn ekg. Patient presented to [facility] where he was found to have STEMI, recommended to get cath however patient did not want to get it there so it left and came here.  Patient initially had cough, congestion, fever, was placed on antibiotics on Sunday.  Started feeling nauseous and had a presyncopal event after which he presented to ED last night.  Had chest pain as well.  Chest pain is midsternal.  Not currently having chest pain.  Received 4 aspirin 30 min pta. (01 Nov 2023 15:11)      Patient seen and evaluated at bedside in CCU. interval events noted    Interval HPI: remain in ccu. IABP in place   no acute events o/n    c/o constipation     PAST MEDICAL & SURGICAL HISTORY:  HTN (hypertension)      CAD (coronary artery disease)  2009; stent      Intracranial hemorrhage  2008      Respiratory arrest  december 1st      Myocardial infarction, unspecified MI type, unspecified artery      History of coronary artery stent placement          REVIEW OF SYSTEMS:  as per hpi     VITALS:   ICU Vital Signs Last 24 Hrs  T(C): 36.8 (27 Nov 2023 08:00), Max: 36.8 (27 Nov 2023 03:00)  T(F): 98.3 (27 Nov 2023 08:00), Max: 98.3 (27 Nov 2023 08:00)  HR: 77 (27 Nov 2023 11:00) (67 - 84)  BP: --  BP(mean): --  ABP: --  ABP(mean): --  RR: 17 (27 Nov 2023 11:00) (16 - 21)  SpO2: 98% (27 Nov 2023 11:00) (95% - 100%)    O2 Parameters below as of 27 Nov 2023 08:57  Patient On (Oxygen Delivery Method): room air            PHYSICAL EXAM:  GENERAL: NAD, comfortable   HEAD:  Atraumatic, Normocephalic  EYES: EOMI, PERRLA, conjunctiva and sclera clear  NECK: Supple, No JVD   CHEST/LUNG: Clear to auscultation bilaterally; No wheeze  HEART: Regular rate and rhythm; No murmurs, rubs, or gallops  ABDOMEN: Soft, Nontender, Nondistended; Bowel sounds present  EXTREMITIES:  2+ Peripheral Pulses, No clubbing, cyanosis, or edema  NEUROLOGY: nonfocal  SKIN: No rashes or lesions  +iabp    Consultant(s) Notes Reviewed:  [x ] YES  [ ] NO  Care Discussed with Consultants/Other Providers [ x] YES  [ ] NO    MEDS:   MEDICATIONS  (STANDING):  aMIOdarone    Tablet   Oral   aMIOdarone    Tablet 200 milliGRAM(s) Oral daily  aspirin  chewable 81 milliGRAM(s) Oral daily  atorvastatin 80 milliGRAM(s) Oral at bedtime  budesonide  80 MICROgram(s)/formoterol 4.5 MICROgram(s) Inhaler 2 Puff(s) Inhalation two times a day  carvedilol 25 milliGRAM(s) Oral every 12 hours  chlorhexidine 2% Cloths 1 Application(s) Topical daily  dextrose 50% Injectable 25 Gram(s) IV Push once  dextrose 50% Injectable 12.5 Gram(s) IV Push once  heparin  Infusion 1600 Unit(s)/Hr (13 mL/Hr) IV Continuous <Continuous>  hydrALAZINE 100 milliGRAM(s) Oral three times a day  insulin glargine Injectable (LANTUS) 12 Unit(s) SubCutaneous at bedtime  insulin lispro (ADMELOG) corrective regimen sliding scale   SubCutaneous three times a day before meals  insulin lispro (ADMELOG) corrective regimen sliding scale   SubCutaneous at bedtime  insulin lispro Injectable (ADMELOG) 8 Unit(s) SubCutaneous three times a day before meals  isosorbide   dinitrate Tablet (ISORDIL) 40 milliGRAM(s) Oral three times a day  lactulose Syrup 30 Gram(s) Oral every 4 hours  polyethylene glycol 3350 17 Gram(s) Oral two times a day  senna 2 Tablet(s) Oral at bedtime  sodium chloride 0.9% Bolus 250 milliLiter(s) IV Bolus once  zoster Vaccine recombinant (SHINGRIX) 0.5 milliLiter(s) IntraMuscular once    MEDICATIONS  (PRN):  hydrOXYzine hydrochloride 25 milliGRAM(s) Oral two times a day PRN Anxiety       ALLERGIES:  penicillins (Unknown)      LABS:                                     12.2   8.93  )-----------( 186      ( 27 Nov 2023 03:09 )             36.6   11-27    130<L>  |  99  |  42<H>  ----------------------------<  178<H>  4.7   |  18<L>  |  1.91<H>    Ca    10.2      27 Nov 2023 03:09  Phos  4.2     11-27  Mg     2.0     11-27    TPro  7.7  /  Alb  4.2  /  TBili  0.4  /  DBili  x   /  AST  23  /  ALT  42  /  AlkPhos  81  11-27    < from: Colonoscopy (11.17.23 @ 10:35) >                                                                                                        Impression:          - The entire examined colon is normal on direct and retroflexion views.                       - No specimens collected.  Recommendation:      - Resume previous diet today.                       - No large polyps or masses detected, No objection from GI standpoint to                 proceed with heart transplantation/advanced heart therapies.                       - Please call back should any further questions or concerns arise.    < end of copied text >     < from: Xray Chest 1 View- PORTABLE-Urgent (11.01.23 @ 07:42) >    IMPRESSION:  Clear lungs.    ---End of Report ---        < end of copied text >  < from: TTE W or WO Ultrasound Enhancing Agent (11.01.23 @ 10:23) >  _____________________________     CONCLUSIONS:      1. Left ventricular cavity is moderately dilated. Left ventricular wall thickness is normal. Left ventricular systolic function is severely decreased with an ejection fraction of 32 % by Chinchilla's method of disks. Regional wall motion abnormalities present.   2. Multiple segmental abnormalities exist. See findings.   3. There is moderate (grade 2) left ventricular diastolic dysfunction, with indeterminant filling pressure.   4. Normal right ventricular cavity size, wall thickness, and systolic function.   5. No significant valvular disease.   6. No pericardial effusion seen.   7. Compared to the transthoracic echocardiogram performed on 1/25/2017 the areas of akinesis are unchanged but there has been a decline in LV systolic function with new areas of hypokinesis.    __________________________________________________________________    < end of copied text >  < from: TTE Limited W or WO Ultrasound Enhancing Agent (11.02.23 @ 07:41) >  __________________________     CONCLUSIONS:      1. After obtaining consent, Definity ultrasound enhancing agent was given for enhanced left ventricular opacification and improved delineation of the left ventricular endocardial borders. Left ventricular systolic function is severely decreased with a calculated ejection fraction of 22 % by the Chinchilla's biplane method of disks. There is a left ventricular thrombus.   2. Findings were discussed with Litzy BOSS on 11/2/2023 at 8.49am.   3. There is a left ventricular thrombus.    _________________________________________________________________    < end of copied text >

## 2023-11-27 NOTE — PROGRESS NOTE ADULT - SUBJECTIVE AND OBJECTIVE BOX
[facility] Division of Kidney Diseases & Hypertension  FOLLOW UP NOTE  [phone]--------------------------------------------------------------------------------  Chief Complaint:Non-ST elevation myocardial infarction (NSTEMI)        24 hour events/subjective:  Pt was seen and examined at the bedside. No acute overnight events. No fresh complaints.   Pt denies SOB/ Constipation/ Diarrhea/ Nausea/ Vomiting/ abdominal pain/ chest pain/ tingling/ numbness.   He said that he was feeling bored as he was stuck here in the bed and he cant move.       PAST HISTORY  --------------------------------------------------------------------------------  No significant changes to PMH, PSH, FHx, SHx, unless otherwise noted    ALLERGIES & MEDICATIONS  --------------------------------------------------------------------------------  Allergies    penicillins (Unknown)    Intolerances      Standing Inpatient Medications  aMIOdarone    Tablet   Oral   aMIOdarone    Tablet 200 milliGRAM(s) Oral daily  aspirin  chewable 81 milliGRAM(s) Oral daily  atorvastatin 80 milliGRAM(s) Oral at bedtime  budesonide  80 MICROgram(s)/formoterol 4.5 MICROgram(s) Inhaler 2 Puff(s) Inhalation two times a day  carvedilol 25 milliGRAM(s) Oral every 12 hours  chlorhexidine 2% Cloths 1 Application(s) Topical daily  dextrose 50% Injectable 25 Gram(s) IV Push once  dextrose 50% Injectable 12.5 Gram(s) IV Push once  heparin  Infusion 1600 Unit(s)/Hr IV Continuous <Continuous>  hydrALAZINE 100 milliGRAM(s) Oral three times a day  insulin glargine Injectable (LANTUS) 12 Unit(s) SubCutaneous at bedtime  insulin lispro (ADMELOG) corrective regimen sliding scale   SubCutaneous at bedtime  insulin lispro (ADMELOG) corrective regimen sliding scale   SubCutaneous three times a day before meals  insulin lispro Injectable (ADMELOG) 8 Unit(s) SubCutaneous three times a day before meals  isosorbide   dinitrate Tablet (ISORDIL) 40 milliGRAM(s) Oral three times a day  lactulose Syrup 20 Gram(s) Oral three times a day  polyethylene glycol 3350 17 Gram(s) Oral two times a day  senna 2 Tablet(s) Oral at bedtime  zoster Vaccine recombinant (SHINGRIX) 0.5 milliLiter(s) IntraMuscular once    PRN Inpatient Medications  hydrOXYzine hydrochloride 25 milliGRAM(s) Oral two times a day PRN      REVIEW OF SYSTEMS  --------------------------------------------------------------------------------  As above.     VITALS/PHYSICAL EXAM  --------------------------------------------------------------------------------  T(C): 36.8 (11-27-23 @ 03:00), Max: 36.8 (11-27-23 @ 03:00)  HR: 78 (11-27-23 @ 08:57) (67 - 84)  BP: --  RR: 16 (11-27-23 @ 07:00) (16 - 21)  SpO2: 98% (11-27-23 @ 08:57) (95% - 100%)  Wt(kg): --        11-26-23 @ 07:01  -  11-27-23 @ 07:00  --------------------------------------------------------  IN: 1892 mL / OUT: 2025 mL / NET: -133 mL      Physical Exam:  Gen: NAD, well-appearing  HEENT: PERRL, supple neck, clear oropharynx  Pulm: CTA B/L  CV: S1S2; IABP +  Back: No spinal or CVA tenderness  Abd: +BS, soft, nontender/nondistended  : No suprapubic tenderness   Extremities: no bilateral LE edema noted.   Neuro: No focal deficits  Skin: Warm, without rashes  Vascular access: NA    LABS/STUDIES  --------------------------------------------------------------------------------              12.2   8.93  >-----------<  186      [11-27-23 @ 03:09]              36.6     130  |  99  |  42  ----------------------------<  178      [11-27-23 @ 03:09]  4.7   |  18  |  1.91        Ca     10.2     [11-27-23 @ 03:09]      Mg     2.0     [11-27-23 @ 03:09]      Phos  4.2     [11-27-23 @ 03:09]    TPro  7.7  /  Alb  4.2  /  TBili  0.4  /  DBili  x   /  AST  23  /  ALT  42  /  AlkPhos  81  [11-27-23 @ 03:09]    PT/INR: PT 12.6 , INR 1.21       [11-27-23 @ 03:09]  PTT: 64.7       [11-27-23 @ 03:09]      Creatinine Trend:  SCr 1.91 [11-27 @ 03:09]  SCr 1.56 [11-26 @ 04:30]  SCr 1.60 [11-25 @ 04:56]  SCr 1.52 [11-24 @ 06:15]  SCr 1.68 [11-23 @ 04:44]    Urinalysis - [11-27-23 @ 03:09]      Color  / Appearance  / SG  / pH       Gluc 178 / Ketone   / Bili  / Urobili        Blood  / Protein  / Leuk Est  / Nitrite       RBC  / WBC  / Hyaline  / Gran  / Sq Epi  / Non Sq Epi  / Bacteria     Urine Creatinine 62      [11-21-23 @ 03:50]  Urine Protein <7      [11-21-23 @ 03:50]        ANCA: cANCA Negative, pANCA Negative, atypical ANCA Negative      [11-21-23 @ 03:50]

## 2023-11-27 NOTE — PROGRESS NOTE ADULT - CRITICAL CARE ATTENDING COMMENT
58 male with HTN, CAD (s/p PCI 2008), HFrEF, CVA 2018, and T2DM a/w cardiogenic shock requiring IABP.         Neuro: A/O, no active issues  Resp: No O2 requirements  CVS: Well supported on IABP. Tolerating coreg, hdzn/isordil. Therapeutic on hep gtt, c/w amio for VT. Listed status 2. c/w asa and lipitor.   GI: No BM for over 10 days, pt reluctant to take bowel regimen.   Renal: mild bump in Cr, appears euvolemic, repeat bmp, trend Na  ID: Monitor off abx   Endo: Monitor sugars, titrate insulin   Heme: hep gtt, plt stable  R fem IABP 11/20

## 2023-11-27 NOTE — PROGRESS NOTE ADULT - ATTENDING COMMENTS
# ARIC -  Serum creatinine inc 1.9- no clinical reason- if real then med likley- hold for now- if repeat inc will repeat UA, U hamzah hyman    # Hyponatremia - limit water intake to < 1 Liter. Patient already trying to limit fluid. Start UreNa 15g BID.     Pt doing OK - breathing is good- spirits OK

## 2023-11-27 NOTE — PROGRESS NOTE ADULT - PROBLEM SELECTOR PLAN 1
ARIC: in the setting of heart failure, COVID infection. At the time of presentation Scr is 1.25. Started to worsen on 11/4 and peaked at 4.07 11/10 and gradually improved to 1.8 on 11/18. Pt had LHC on 11/1. He has hx of DM with proteinuria of 684 but most recent UA negative. Primary team is planning to get cardiac transplant eval. Remains nonoliguric, UOP 1.7L/24h. HIV, Hep B, Hep C negative. A1c - 8.3. Complements are not low. BETZAIDA and ANCA negative. Light chains - not significant. UPCR <0.1, PLA2R ab - negative, Anti GBM abs - negative. UPEP negative.   Duplex - normal, symmetric blood flow throughout both kidneys. Velocities and RIs are limited by IABP.   Serum creatinine slightly elevated to 1.9 from 1.5. He is on vancomycin but the trough is not high. Pt's Hemodynamics are managed by cardio team and pt remained on IABP     PLAN:  No indication for dialysis.  Avoid nephrotoxic agents. Dose meds per eGFR.

## 2023-11-27 NOTE — PROGRESS NOTE ADULT - SUBJECTIVE AND OBJECTIVE BOX
ADVANCED HEART FAILURE & TRANSPLANT  - PROGRESS NOTE  *To reach the NS2 Team from 8am to 5pm (MON-FRI), please call [phone].   _______________________________________________________________________________________________________    Subjective:    Medications:  aMIOdarone    Tablet 200 milliGRAM(s) Oral daily  aMIOdarone    Tablet   Oral   aspirin  chewable 81 milliGRAM(s) Oral daily  atorvastatin 80 milliGRAM(s) Oral at bedtime  budesonide  80 MICROgram(s)/formoterol 4.5 MICROgram(s) Inhaler 2 Puff(s) Inhalation two times a day  carvedilol 25 milliGRAM(s) Oral every 12 hours  chlorhexidine 2% Cloths 1 Application(s) Topical daily  dextrose 50% Injectable 12.5 Gram(s) IV Push once  dextrose 50% Injectable 25 Gram(s) IV Push once  heparin  Infusion 1600 Unit(s)/Hr IV Continuous <Continuous>  hydrALAZINE 100 milliGRAM(s) Oral three times a day  hydrOXYzine hydrochloride 25 milliGRAM(s) Oral two times a day PRN  insulin glargine Injectable (LANTUS) 12 Unit(s) SubCutaneous at bedtime  insulin lispro (ADMELOG) corrective regimen sliding scale   SubCutaneous three times a day before meals  insulin lispro (ADMELOG) corrective regimen sliding scale   SubCutaneous at bedtime  insulin lispro Injectable (ADMELOG) 8 Unit(s) SubCutaneous three times a day before meals  isosorbide   dinitrate Tablet (ISORDIL) 40 milliGRAM(s) Oral three times a day  lactulose Syrup 30 Gram(s) Oral every 4 hours  polyethylene glycol 3350 17 Gram(s) Oral two times a day  senna 2 Tablet(s) Oral at bedtime  zoster Vaccine recombinant (SHINGRIX) 0.5 milliLiter(s) IntraMuscular once      Physical Exam:    Vitals:  Vital Signs Last 24 Hours  T(C): 36.8 (23 @ 08:00), Max: 36.8 (23 @ 03:00)  HR: 77 (23 @ 11:00) (67 - 84)  BP: --  RR: 17 (23 @ 11:00) (16 - 21)  SpO2: 98% (23 @ 11:00) (95% - 100%)    Weight in k.8 ( @ 06:00)    I&O's Summary    2023 07:  -  2023 07:00  --------------------------------------------------------  IN: 1892 mL / OUT: 2025 mL / NET: -133 mL    2023 07:01  -  2023 11:29  --------------------------------------------------------  IN: 39 mL / OUT: 0 mL / NET: 39 mL        Tele:    General: No distress. Comfortable.  HEENT: EOM intact.  Neck: Neck supple. JVP not elevated. No masses  Chest: Clear to auscultation bilaterally  CV: Normal S1 and S2. No murmurs, rub, or gallops. Radial pulses normal.  Abdomen: Soft, non-distended, non-tender  Skin: No rashes or skin breakdown  Extremities: No LE edema  Neurology: Alert and oriented times three. Sensation intact  Psych: Affect normal    Labs:                        12.2   8.93  )-----------( 186      ( 2023 03:09 )             36.6         130<L>  |  99  |  42<H>  ----------------------------<  178<H>  4.7   |  18<L>  |  1.91<H>    Ca    10.2      2023 03:09  Phos  4.2       Mg     2.0         TPro  7.7  /  Alb  4.2  /  TBili  0.4  /  DBili  x   /  AST  23  /  ALT  42  /  AlkPhos  81      PT/INR - ( 2023 03:09 )   PT: 12.6 sec;   INR: 1.21 ratio         PTT - ( 2023 03:09 )  PTT:64.7 sec        Oxygen Saturation, Mixed: 72.0 ( @ 16:45)      Lactate, Blood: 1.4 mmol/L ( @ 03:09)  Lactate, Blood: 1.4 mmol/L ( @ 04:30)     ADVANCED HEART FAILURE & TRANSPLANT  - PROGRESS NOTE  *To reach the NS2 Team from 8am to 5pm (MON-FRI), please call [phone].   _______________________________________________________________________________________________________    Subjective:  - NAEO  - Denies SOB, no overt orthopnea  - States his las BM was 1week ago      Medications:  aMIOdarone    Tablet 200 milliGRAM(s) Oral daily  aMIOdarone    Tablet   Oral   aspirin  chewable 81 milliGRAM(s) Oral daily  atorvastatin 80 milliGRAM(s) Oral at bedtime  budesonide  80 MICROgram(s)/formoterol 4.5 MICROgram(s) Inhaler 2 Puff(s) Inhalation two times a day  carvedilol 25 milliGRAM(s) Oral every 12 hours  chlorhexidine 2% Cloths 1 Application(s) Topical daily  dextrose 50% Injectable 12.5 Gram(s) IV Push once  dextrose 50% Injectable 25 Gram(s) IV Push once  heparin  Infusion 1600 Unit(s)/Hr IV Continuous <Continuous>  hydrALAZINE 100 milliGRAM(s) Oral three times a day  hydrOXYzine hydrochloride 25 milliGRAM(s) Oral two times a day PRN  insulin glargine Injectable (LANTUS) 12 Unit(s) SubCutaneous at bedtime  insulin lispro (ADMELOG) corrective regimen sliding scale   SubCutaneous three times a day before meals  insulin lispro (ADMELOG) corrective regimen sliding scale   SubCutaneous at bedtime  insulin lispro Injectable (ADMELOG) 8 Unit(s) SubCutaneous three times a day before meals  isosorbide   dinitrate Tablet (ISORDIL) 40 milliGRAM(s) Oral three times a day  lactulose Syrup 30 Gram(s) Oral every 4 hours  polyethylene glycol 3350 17 Gram(s) Oral two times a day  senna 2 Tablet(s) Oral at bedtime  zoster Vaccine recombinant (SHINGRIX) 0.5 milliLiter(s) IntraMuscular once      Physical Exam:    Vitals:  Vital Signs Last 24 Hours  T(C): 36.8 (23 @ 08:00), Max: 36.8 (23 @ 03:00)  HR: 77 (23 @ 11:00) (67 - 84)  Aug mean: 70-75  RR: 17 (23 @ 11:00) (16 - 21)  SpO2: 98% (23 @ 11:00) (95% - 100%)    Weight in k.8 ( @ 06:00)    I&O's Summary    2023 07:  -  2023 07:00  --------------------------------------------------------  IN: 1892 mL / OUT: 2025 mL / NET: -133 mL    2023 07:  -  2023 11:29  --------------------------------------------------------  IN: 39 mL / OUT: 0 mL / NET: 39 mL      Tele:  60-70's    General: No distress. Comfortable.  HEENT: EOM intact.  Neck: JVP ~6 cm of H2O  Chest: Clear to auscultation bilaterally  CV: Normal S1 and S2. No murmurs, rub, or gallops. Radial pulses normal, warm peripherally  Abdomen: Soft, non-distended, non-tender  Skin: No rashes or skin breakdown, L Groin IABP site benign  Extremities: No LE edema  Neurology: Alert and oriented times three. Sensation intact  Psych: Affect normal    Labs:                        12.2   8.93  )-----------( 186      ( 2023 03:09 )             36.6         130<L>  |  99  |  42<H>  ----------------------------<  178<H>  4.7   |  18<L>  |  1.91<H>    Ca    10.2      2023 03:09  Phos  4.2       Mg     2.0         TPro  7.7  /  Alb  4.2  /  TBili  0.4  /  DBili  x   /  AST  23  /  ALT  42  /  AlkPhos  81      PT/INR - ( 2023 03:09 )   PT: 12.6 sec;   INR: 1.21 ratio    PTT - ( 2023 03:09 )  PTT:64.7 sec    Oxygen Saturation, Mixed: 72.0 ( @ 16:45)    Lactate, Blood: 1.4 mmol/L ( @ 03:09)  Lactate, Blood: 1.4 mmol/L ( @ 04:30)

## 2023-11-27 NOTE — PROGRESS NOTE ADULT - NS ATTEND AMEND GEN_ALL_CORE FT
Pt remains critically ill on IABP support and afterload reducing agents.   He reports constipation. Also, states that he's mentally drained. He's frustrated about his prolonged hospitalization. He feels that he has lost his independence. As per CICU staff, the pt has been trying to control his care and refuses meds, dressing changes, baths, etc.   We had a long discussion today about the importance of following recommendations and his current critical condition.  I've recommended him to gila this time to work on activities he was not able to do before. He's working on his Yoruba and will also read the transplant book.   Behavioral cardiology will follow up today.   His fluid intake has been low on the past few days. Filling pressures over the weekend were low. Agree w 250 cc IVF and recheck of cretainine. If it remains elevated may have to status 7.   Listed for heart tx UNOS status 2.

## 2023-11-27 NOTE — PROGRESS NOTE ADULT - PROBLEM SELECTOR PLAN 2
Hyponatremia: developed during this hospital course. He is not symptomatic. Urine Osm 471 and Urine Na 88.   Given 150cc bolus of 3% saline on 11/24. Na today 130 and pt remianed asymptomatic.     PLAN:  Recommend fluid restriction to <1L/day. Discussed with pt.  Avoiding salt tabs iso heart failure, but recommend Urena 15g BID.   rest of the management as per the primary team.

## 2023-11-27 NOTE — PROGRESS NOTE ADULT - SUBJECTIVE AND OBJECTIVE BOX
INFECTIOUS DISEASES FOLLOW UP-- Courtney Peters  [phone]    This is a follow up note for this  58yMale with  Non-ST elevation myocardial infarction (NSTEMI)        ROS:  CONSTITUTIONAL:  No fever, good appetite  CARDIOVASCULAR:  No chest pain or palpitations  RESPIRATORY:  No dyspnea  GASTROINTESTINAL:  No nausea, vomiting, diarrhea, or abdominal pain  GENITOURINARY:  No dysuria  NEUROLOGIC:  No headache,     Allergies    penicillins (Unknown)    Intolerances        ANTIBIOTICS/RELEVANT:  antimicrobials    immunologic:  zoster Vaccine recombinant (SHINGRIX) 0.5 milliLiter(s) IntraMuscular once    OTHER:  aMIOdarone    Tablet   Oral   aMIOdarone    Tablet 200 milliGRAM(s) Oral daily  aspirin  chewable 81 milliGRAM(s) Oral daily  atorvastatin 80 milliGRAM(s) Oral at bedtime  budesonide  80 MICROgram(s)/formoterol 4.5 MICROgram(s) Inhaler 2 Puff(s) Inhalation two times a day  carvedilol 25 milliGRAM(s) Oral every 12 hours  chlorhexidine 2% Cloths 1 Application(s) Topical daily  dextrose 50% Injectable 25 Gram(s) IV Push once  dextrose 50% Injectable 12.5 Gram(s) IV Push once  heparin  Infusion 1600 Unit(s)/Hr IV Continuous <Continuous>  hydrALAZINE 100 milliGRAM(s) Oral three times a day  hydrOXYzine hydrochloride 25 milliGRAM(s) Oral two times a day PRN  insulin glargine Injectable (LANTUS) 12 Unit(s) SubCutaneous at bedtime  insulin lispro (ADMELOG) corrective regimen sliding scale   SubCutaneous three times a day before meals  insulin lispro (ADMELOG) corrective regimen sliding scale   SubCutaneous at bedtime  insulin lispro Injectable (ADMELOG) 8 Unit(s) SubCutaneous three times a day before meals  isosorbide   dinitrate Tablet (ISORDIL) 40 milliGRAM(s) Oral three times a day  lactulose Syrup 30 Gram(s) Oral every 4 hours  polyethylene glycol 3350 17 Gram(s) Oral two times a day  senna 2 Tablet(s) Oral at bedtime      Objective:  Vital Signs Last 24 Hrs  T(C): 36.8 (27 Nov 2023 08:00), Max: 36.8 (27 Nov 2023 03:00)  T(F): 98.3 (27 Nov 2023 08:00), Max: 98.3 (27 Nov 2023 08:00)  HR: 75 (27 Nov 2023 15:00) (67 - 82)  BP: --  BP(mean): --  RR: 25 (27 Nov 2023 15:00) (16 - 25)  SpO2: 98% (27 Nov 2023 15:00) (95% - 100%)    Parameters below as of 27 Nov 2023 08:57  Patient On (Oxygen Delivery Method): room air         PHYSICAL EXAM:  Constitutional:no acute distress  Eyes:MARVEL, EOMI  Ear/Nose/Throat: no oral lesions, 	  Respiratory: clear BL  Cardiovascular: S1S2  Gastrointestinal:soft, (+) BS, no tenderness  Extremities:no e/e/c  No Lymphadenopathy  IV sites not inflammed.    LABS:                        12.2   8.93  )-----------( 186      ( 27 Nov 2023 03:09 )             36.6     11-27    127<L>  |  96  |  38<H>  ----------------------------<  176<H>  4.7   |  17<L>  |  1.50<H>    Ca    9.7      27 Nov 2023 15:08  Phos  4.4     11-27  Mg     2.0     11-27    TPro  7.7  /  Alb  3.8  /  TBili  0.4  /  DBili  x   /  AST  29  /  ALT  47<H>  /  AlkPhos  80  11-27    PT/INR - ( 27 Nov 2023 03:09 )   PT: 12.6 sec;   INR: 1.21 ratio         PTT - ( 27 Nov 2023 03:09 )  PTT:64.7 sec  Urinalysis Basic - ( 27 Nov 2023 15:08 )    Color: x / Appearance: x / SG: x / pH: x  Gluc: 176 mg/dL / Ketone: x  / Bili: x / Urobili: x   Blood: x / Protein: x / Nitrite: x   Leuk Esterase: x / RBC: x / WBC x   Sq Epi: x / Non Sq Epi: x / Bacteria: x        MICROBIOLOGY:      Urinalysis + Microscopic Examination (11.21.23 @ 03:50)    pH Urine: 5.0   Urine Appearance: Clear   Color: Yellow   Specific Gravity: 1.014   Protein, Urine: Negative mg/dL   Glucose Qualitative, Urine: 250 mg/dL   Ketone - Urine: Negative mg/dL   Blood, Urine: Negative   Bilirubin: Negative   Urobilinogen: 1.0 mg/dL   Leukocyte Esterase Concentration: Negative   Nitrite: Negative   White Blood Cell - Urine: 1 /HPF   Red Blood Cell - Urine: 0 /HPF   Bacteria: Negative /HPF   Cast: 2 /LPF   Epithelial Cells: 0 /HPF          RECENT CULTURES:      RADIOLOGY & ADDITIONAL STUDIES:    < from: Xray Chest 1 View- PORTABLE-Routine (Xray Chest 1 View- PORTABLE-Routine in AM.) (11.27.23 @ 07:18) >    FINDINGS:  IABP radiodense tip is 2.5cm below top of aortic knob.  The cardiomediastinal silhouette is stable.  Clear lungs. No pleural effusion or pneumothorax    IMPRESSION:  IABP tip as above.    < end of copied text >   INFECTIOUS DISEASES FOLLOW UP-- Courtney Peters  [phone]    This is a follow up note for this  58yMale with  Non-ST elevation myocardial infarction (NSTEMI)        ROS:  CONSTITUTIONAL:  No fever, good appetite  CARDIOVASCULAR:  No chest pain or palpitations  RESPIRATORY:  No dyspnea  GASTROINTESTINAL:  No nausea, vomiting, diarrhea, or abdominal pain  GENITOURINARY:  No dysuria  NEUROLOGIC:  No headache,     Allergies    penicillins (Unknown)    Intolerances        ANTIBIOTICS/RELEVANT:  antimicrobials    immunologic:  zoster Vaccine recombinant (SHINGRIX) 0.5 milliLiter(s) IntraMuscular once    OTHER:  aMIOdarone    Tablet   Oral   aMIOdarone    Tablet 200 milliGRAM(s) Oral daily  aspirin  chewable 81 milliGRAM(s) Oral daily  atorvastatin 80 milliGRAM(s) Oral at bedtime  budesonide  80 MICROgram(s)/formoterol 4.5 MICROgram(s) Inhaler 2 Puff(s) Inhalation two times a day  carvedilol 25 milliGRAM(s) Oral every 12 hours  chlorhexidine 2% Cloths 1 Application(s) Topical daily  dextrose 50% Injectable 25 Gram(s) IV Push once  dextrose 50% Injectable 12.5 Gram(s) IV Push once  heparin  Infusion 1600 Unit(s)/Hr IV Continuous <Continuous>  hydrALAZINE 100 milliGRAM(s) Oral three times a day  hydrOXYzine hydrochloride 25 milliGRAM(s) Oral two times a day PRN  insulin glargine Injectable (LANTUS) 12 Unit(s) SubCutaneous at bedtime  insulin lispro (ADMELOG) corrective regimen sliding scale   SubCutaneous three times a day before meals  insulin lispro (ADMELOG) corrective regimen sliding scale   SubCutaneous at bedtime  insulin lispro Injectable (ADMELOG) 8 Unit(s) SubCutaneous three times a day before meals  isosorbide   dinitrate Tablet (ISORDIL) 40 milliGRAM(s) Oral three times a day  lactulose Syrup 30 Gram(s) Oral every 4 hours  polyethylene glycol 3350 17 Gram(s) Oral two times a day  senna 2 Tablet(s) Oral at bedtime      Objective:  Vital Signs Last 24 Hrs  T(C): 36.8 (27 Nov 2023 08:00), Max: 36.8 (27 Nov 2023 03:00)  T(F): 98.3 (27 Nov 2023 08:00), Max: 98.3 (27 Nov 2023 08:00)  HR: 75 (27 Nov 2023 15:00) (67 - 82)  BP: --  BP(mean): --  RR: 25 (27 Nov 2023 15:00) (16 - 25)  SpO2: 98% (27 Nov 2023 15:00) (95% - 100%)    Parameters below as of 27 Nov 2023 08:57  Patient On (Oxygen Delivery Method): room air         PHYSICAL EXAM:  Constitutional:no acute distress  Eyes:MARVEL, EOMI  Ear/Nose/Throat: no oral lesions, 	  Respiratory: clear BL  Cardiovascular: S1S2  Gastrointestinal:soft, (+) BS, no tenderness  Extremities:no e/e/c iABP right femoral area  No Lymphadenopathy  IV sites not inflammed.    LABS:                        12.2   8.93  )-----------( 186      ( 27 Nov 2023 03:09 )             36.6     11-27    127<L>  |  96  |  38<H>  ----------------------------<  176<H>  4.7   |  17<L>  |  1.50<H>    Ca    9.7      27 Nov 2023 15:08  Phos  4.4     11-27  Mg     2.0     11-27    TPro  7.7  /  Alb  3.8  /  TBili  0.4  /  DBili  x   /  AST  29  /  ALT  47<H>  /  AlkPhos  80  11-27    PT/INR - ( 27 Nov 2023 03:09 )   PT: 12.6 sec;   INR: 1.21 ratio         PTT - ( 27 Nov 2023 03:09 )  PTT:64.7 sec  Urinalysis Basic - ( 27 Nov 2023 15:08 )    Color: x / Appearance: x / SG: x / pH: x  Gluc: 176 mg/dL / Ketone: x  / Bili: x / Urobili: x   Blood: x / Protein: x / Nitrite: x   Leuk Esterase: x / RBC: x / WBC x   Sq Epi: x / Non Sq Epi: x / Bacteria: x        MICROBIOLOGY:      Urinalysis + Microscopic Examination (11.21.23 @ 03:50)    pH Urine: 5.0   Urine Appearance: Clear   Color: Yellow   Specific Gravity: 1.014   Protein, Urine: Negative mg/dL   Glucose Qualitative, Urine: 250 mg/dL   Ketone - Urine: Negative mg/dL   Blood, Urine: Negative   Bilirubin: Negative   Urobilinogen: 1.0 mg/dL   Leukocyte Esterase Concentration: Negative   Nitrite: Negative   White Blood Cell - Urine: 1 /HPF   Red Blood Cell - Urine: 0 /HPF   Bacteria: Negative /HPF   Cast: 2 /LPF   Epithelial Cells: 0 /HPF          RECENT CULTURES:      RADIOLOGY & ADDITIONAL STUDIES:    < from: Xray Chest 1 View- PORTABLE-Routine (Xray Chest 1 View- PORTABLE-Routine in AM.) (11.27.23 @ 07:18) >    FINDINGS:  IABP radiodense tip is 2.5cm below top of aortic knob.  The cardiomediastinal silhouette is stable.  Clear lungs. No pleural effusion or pneumothorax    IMPRESSION:  IABP tip as above.    < end of copied text >

## 2023-11-27 NOTE — PROGRESS NOTE ADULT - ASSESSMENT
59 yo M with PMHx of HTN, CAD w/ 1 stent in 2009, ICH (2008) presented on 11/1 with abn ekg. Patient presented to [facility] where he was found to have STEMI, recommended to get cath however patient did not want to get it there so he left and came here.   EKG here with ST depression in lateral leads and elevation in anterior leads   Prior to [facility] found to be tachycardic, dyspneic, intubated   [facility] 11/1 with chronic total occlusion of LAD and RCA, with elevated RA and PA pressures  TTE 11/1 with severely decreased EF 32%, s/p IABP 11/1    RVP (11/1) Positive for COVID19  RVP (11/10) Negative  BCx (11/2, 11/4) NGTD  ETT Culture (11/2) NGTD  MRSA/MSSA PCR (11/2, 11/10) Negative  UA (11/10) 1 WBC    CXR (11/10) Clear Lungs  TTE (11/2) Left ventricular thrombus.    #Enterococcus Bacteremia, Leukocytosis, Pre-Heart Transplant Evaluation Serologies  Concern for either central line or abdominal source (urine also positive)  CT A/P Noncontrast (11/18) No acute process  Given mixed cultures with Staph epi and Enterococcus faecalis would continue Vancomycin   Patient's penicillin allergy is questionable and patient does not recall reaction.   Anticipate we can utilize Ampicillin in the future with close monitoring or arrange skin challenge for definitive clearance   -- removal/exchange of Balloon Pump done on 11/21  --Continue Vancomycin 1.5g IV Q24H.  Target trough 15-20  most recent specimen sent was not a true trough would repeat just prior to the next dose- Anticipate 10 days of antibiotics to be completed 11/27 (balloon pump being exchanged, would be sufficient post-abdominal translocation)  --Continue to monitor renal function and vancomycin levels to avoid nephrotoxicity / ototoxicity secondary to vancomycin level 11/21 therapeutic range  --Continue to follow CBC with diff  --Continue to follow temperature curve  --Follow up on susceptibility of Enterococcus faecalis shows Vanco and Ampi sensitive  --Follow up on repeat blood cultures from 11/18 are negative  -- TTE to look for vegetations unable to r/o endocarditis but no vegetations seen    Given evidence of clearing of bacteremia could move forward from an ID perspective to pursue advanced cardiac therapies- listed for transplant status 2E      #Encounter to Vaccinate Patient  COVID19: COVID19 Infection This Admission. Would consider Vaccination in ~3 months  Influenza: declined  Pneumococcal: Would benefit from PCV20  HAV: recommend Havrix  HBV: recommend Heplisav  MMR: Not Mumps Immune, if >1 month until transplant would consider MMR dose  Varicella: Immune  Shingles: recommend Shingrix series  Tdap: recommend Tdap      Chao Peters MD  Can be called via Teams  After 5pm/weekends [phone]     57 yo M with PMHx of HTN, CAD w/ 1 stent in 2009, ICH (2008) presented on 11/1 with abn ekg. Patient presented to [facility] where he was found to have STEMI, recommended to get cath however patient did not want to get it there so he left and came here.   EKG here with ST depression in lateral leads and elevation in anterior leads   Prior to [facility] found to be tachycardic, dyspneic, intubated   [facility] 11/1 with chronic total occlusion of LAD and RCA, with elevated RA and PA pressures  TTE 11/1 with severely decreased EF 32%, s/p IABP 11/1    RVP (11/1) Positive for COVID19  RVP (11/10) Negative  BCx (11/2, 11/4) NGTD  ETT Culture (11/2) NGTD  MRSA/MSSA PCR (11/2, 11/10) Negative  UA (11/10) 1 WBC    CXR (11/10) Clear Lungs  TTE (11/2) Left ventricular thrombus.    #Enterococcus Bacteremia, Leukocytosis, Pre-Heart Transplant Evaluation Serologies  Concern for either central line or abdominal source (urine also positive)  CT A/P Noncontrast (11/18) No acute process  Given mixed cultures with Staph epi and Enterococcus faecalis would continue Vancomycin   Patient's penicillin allergy is questionable and patient does not recall reaction.   Anticipate we can utilize Ampicillin in the future with close monitoring or arrange skin challenge for definitive clearance   -- removal/exchange of Balloon Pump done on 11/21 completed a course of IV Vancomycin  --Continue to monitor renal function and vancomycin levels to avoid nephrotoxicity / ototoxicity secondary to vancomycin level 11/21 therapeutic range  --Continue to follow CBC with diff  --Continue to follow temperature curve  --Follow up on susceptibility of Enterococcus faecalis shows Vanco and Ampi sensitive  --Follow up on repeat blood cultures from 11/18 are negative  -- TTE to look for vegetations unable to r/o endocarditis but no vegetations seen    Given evidence of clearing of bacteremia could move forward from an ID perspective to pursue advanced cardiac therapies- listed for transplant status 2E      #Encounter to Vaccinate Patient  COVID19: COVID19 Infection This Admission. Would consider Vaccination in ~3 months  Influenza: declined  Pneumococcal: Would benefit from PCV20  HAV: recommend Havrix  HBV: recommend Heplisav  MMR: Not Mumps Immune, if >1 month until transplant would consider MMR dose  Varicella: Immune  Shingles: recommend Shingrix series  Tdap: recommend Tdap      Chao Peters MD  Can be called via Teams  After 5pm/weekends [phone]

## 2023-11-27 NOTE — PROGRESS NOTE ADULT - ASSESSMENT
58 yrs old male w/ hx of HFrEF (LVIDd 6.4 cm, LVEF 32%), CAD s/p PCI (2008), HTN, DMT2 (A1c 8.3%) and CVA s/p TPA (2018), recently treated for PNA who initially presented to [facility] via EMS after syncope reportedly requiring defibrilation. Treated for ACS but left AMA to come to [facility]. Pt has covid and was placed on IABP for hypotension. Now being evaluated for Heart transplant Vs LVAD placement. Nephrology consulted for ARIC.

## 2023-11-28 LAB
ALBUMIN SERPL ELPH-MCNC: 4 G/DL — SIGNIFICANT CHANGE UP (ref 3.3–5)
ALBUMIN SERPL ELPH-MCNC: 4 G/DL — SIGNIFICANT CHANGE UP (ref 3.3–5)
ALP SERPL-CCNC: 84 U/L — SIGNIFICANT CHANGE UP (ref 40–120)
ALP SERPL-CCNC: 84 U/L — SIGNIFICANT CHANGE UP (ref 40–120)
ALT FLD-CCNC: 49 U/L — HIGH (ref 10–45)
ALT FLD-CCNC: 49 U/L — HIGH (ref 10–45)
ANION GAP SERPL CALC-SCNC: 14 MMOL/L — SIGNIFICANT CHANGE UP (ref 5–17)
ANION GAP SERPL CALC-SCNC: 14 MMOL/L — SIGNIFICANT CHANGE UP (ref 5–17)
APPEARANCE UR: CLEAR — SIGNIFICANT CHANGE UP
APPEARANCE UR: CLEAR — SIGNIFICANT CHANGE UP
APTT BLD: 78.8 SEC — HIGH (ref 24.5–35.6)
APTT BLD: 78.8 SEC — HIGH (ref 24.5–35.6)
AST SERPL-CCNC: 31 U/L — SIGNIFICANT CHANGE UP (ref 10–40)
AST SERPL-CCNC: 31 U/L — SIGNIFICANT CHANGE UP (ref 10–40)
BACTERIA # UR AUTO: NEGATIVE /HPF — SIGNIFICANT CHANGE UP
BACTERIA # UR AUTO: NEGATIVE /HPF — SIGNIFICANT CHANGE UP
BASOPHILS # BLD AUTO: 0.04 K/UL — SIGNIFICANT CHANGE UP (ref 0–0.2)
BASOPHILS # BLD AUTO: 0.04 K/UL — SIGNIFICANT CHANGE UP (ref 0–0.2)
BASOPHILS NFR BLD AUTO: 0.4 % — SIGNIFICANT CHANGE UP (ref 0–2)
BASOPHILS NFR BLD AUTO: 0.4 % — SIGNIFICANT CHANGE UP (ref 0–2)
BILIRUB SERPL-MCNC: 0.4 MG/DL — SIGNIFICANT CHANGE UP (ref 0.2–1.2)
BILIRUB SERPL-MCNC: 0.4 MG/DL — SIGNIFICANT CHANGE UP (ref 0.2–1.2)
BILIRUB UR-MCNC: NEGATIVE — SIGNIFICANT CHANGE UP
BILIRUB UR-MCNC: NEGATIVE — SIGNIFICANT CHANGE UP
BUN SERPL-MCNC: 38 MG/DL — HIGH (ref 7–23)
BUN SERPL-MCNC: 38 MG/DL — HIGH (ref 7–23)
CALCIUM SERPL-MCNC: 10.1 MG/DL — SIGNIFICANT CHANGE UP (ref 8.4–10.5)
CALCIUM SERPL-MCNC: 10.1 MG/DL — SIGNIFICANT CHANGE UP (ref 8.4–10.5)
CAST: 25 /LPF — HIGH (ref 0–4)
CAST: 25 /LPF — HIGH (ref 0–4)
CHLORIDE SERPL-SCNC: 97 MMOL/L — SIGNIFICANT CHANGE UP (ref 96–108)
CHLORIDE SERPL-SCNC: 97 MMOL/L — SIGNIFICANT CHANGE UP (ref 96–108)
CO2 SERPL-SCNC: 16 MMOL/L — LOW (ref 22–31)
CO2 SERPL-SCNC: 16 MMOL/L — LOW (ref 22–31)
COLOR SPEC: YELLOW — SIGNIFICANT CHANGE UP
COLOR SPEC: YELLOW — SIGNIFICANT CHANGE UP
CREAT SERPL-MCNC: 1.52 MG/DL — HIGH (ref 0.5–1.3)
CREAT SERPL-MCNC: 1.52 MG/DL — HIGH (ref 0.5–1.3)
DIFF PNL FLD: NEGATIVE — SIGNIFICANT CHANGE UP
DIFF PNL FLD: NEGATIVE — SIGNIFICANT CHANGE UP
EGFR: 52 ML/MIN/1.73M2 — LOW
EGFR: 52 ML/MIN/1.73M2 — LOW
EOSINOPHIL # BLD AUTO: 0.27 K/UL — SIGNIFICANT CHANGE UP (ref 0–0.5)
EOSINOPHIL # BLD AUTO: 0.27 K/UL — SIGNIFICANT CHANGE UP (ref 0–0.5)
EOSINOPHIL NFR BLD AUTO: 2.6 % — SIGNIFICANT CHANGE UP (ref 0–6)
EOSINOPHIL NFR BLD AUTO: 2.6 % — SIGNIFICANT CHANGE UP (ref 0–6)
GLUCOSE BLDC GLUCOMTR-MCNC: 173 MG/DL — HIGH (ref 70–99)
GLUCOSE BLDC GLUCOMTR-MCNC: 173 MG/DL — HIGH (ref 70–99)
GLUCOSE BLDC GLUCOMTR-MCNC: 183 MG/DL — HIGH (ref 70–99)
GLUCOSE BLDC GLUCOMTR-MCNC: 183 MG/DL — HIGH (ref 70–99)
GLUCOSE BLDC GLUCOMTR-MCNC: 192 MG/DL — HIGH (ref 70–99)
GLUCOSE BLDC GLUCOMTR-MCNC: 192 MG/DL — HIGH (ref 70–99)
GLUCOSE BLDC GLUCOMTR-MCNC: 213 MG/DL — HIGH (ref 70–99)
GLUCOSE BLDC GLUCOMTR-MCNC: 213 MG/DL — HIGH (ref 70–99)
GLUCOSE SERPL-MCNC: 206 MG/DL — HIGH (ref 70–99)
GLUCOSE SERPL-MCNC: 206 MG/DL — HIGH (ref 70–99)
GLUCOSE UR QL: NEGATIVE MG/DL — SIGNIFICANT CHANGE UP
GLUCOSE UR QL: NEGATIVE MG/DL — SIGNIFICANT CHANGE UP
HCT VFR BLD CALC: 36.7 % — LOW (ref 39–50)
HCT VFR BLD CALC: 36.7 % — LOW (ref 39–50)
HGB BLD-MCNC: 12.5 G/DL — LOW (ref 13–17)
HGB BLD-MCNC: 12.5 G/DL — LOW (ref 13–17)
HYALINE CASTS # UR AUTO: PRESENT
HYALINE CASTS # UR AUTO: PRESENT
IMM GRANULOCYTES NFR BLD AUTO: 0.5 % — SIGNIFICANT CHANGE UP (ref 0–0.9)
IMM GRANULOCYTES NFR BLD AUTO: 0.5 % — SIGNIFICANT CHANGE UP (ref 0–0.9)
INR BLD: 1.19 RATIO — HIGH (ref 0.85–1.18)
INR BLD: 1.19 RATIO — HIGH (ref 0.85–1.18)
KETONES UR-MCNC: NEGATIVE MG/DL — SIGNIFICANT CHANGE UP
KETONES UR-MCNC: NEGATIVE MG/DL — SIGNIFICANT CHANGE UP
LEUKOCYTE ESTERASE UR-ACNC: NEGATIVE — SIGNIFICANT CHANGE UP
LEUKOCYTE ESTERASE UR-ACNC: NEGATIVE — SIGNIFICANT CHANGE UP
LYMPHOCYTES # BLD AUTO: 0.87 K/UL — LOW (ref 1–3.3)
LYMPHOCYTES # BLD AUTO: 0.87 K/UL — LOW (ref 1–3.3)
LYMPHOCYTES # BLD AUTO: 8.2 % — LOW (ref 13–44)
LYMPHOCYTES # BLD AUTO: 8.2 % — LOW (ref 13–44)
MAGNESIUM SERPL-MCNC: 2.1 MG/DL — SIGNIFICANT CHANGE UP (ref 1.6–2.6)
MAGNESIUM SERPL-MCNC: 2.1 MG/DL — SIGNIFICANT CHANGE UP (ref 1.6–2.6)
MCHC RBC-ENTMCNC: 29.8 PG — SIGNIFICANT CHANGE UP (ref 27–34)
MCHC RBC-ENTMCNC: 29.8 PG — SIGNIFICANT CHANGE UP (ref 27–34)
MCHC RBC-ENTMCNC: 34.1 GM/DL — SIGNIFICANT CHANGE UP (ref 32–36)
MCHC RBC-ENTMCNC: 34.1 GM/DL — SIGNIFICANT CHANGE UP (ref 32–36)
MCV RBC AUTO: 87.4 FL — SIGNIFICANT CHANGE UP (ref 80–100)
MCV RBC AUTO: 87.4 FL — SIGNIFICANT CHANGE UP (ref 80–100)
MONOCYTES # BLD AUTO: 0.56 K/UL — SIGNIFICANT CHANGE UP (ref 0–0.9)
MONOCYTES # BLD AUTO: 0.56 K/UL — SIGNIFICANT CHANGE UP (ref 0–0.9)
MONOCYTES NFR BLD AUTO: 5.3 % — SIGNIFICANT CHANGE UP (ref 2–14)
MONOCYTES NFR BLD AUTO: 5.3 % — SIGNIFICANT CHANGE UP (ref 2–14)
NEUTROPHILS # BLD AUTO: 8.79 K/UL — HIGH (ref 1.8–7.4)
NEUTROPHILS # BLD AUTO: 8.79 K/UL — HIGH (ref 1.8–7.4)
NEUTROPHILS NFR BLD AUTO: 83 % — HIGH (ref 43–77)
NEUTROPHILS NFR BLD AUTO: 83 % — HIGH (ref 43–77)
NITRITE UR-MCNC: NEGATIVE — SIGNIFICANT CHANGE UP
NITRITE UR-MCNC: NEGATIVE — SIGNIFICANT CHANGE UP
NRBC # BLD: 0 /100 WBCS — SIGNIFICANT CHANGE UP (ref 0–0)
NRBC # BLD: 0 /100 WBCS — SIGNIFICANT CHANGE UP (ref 0–0)
OSMOLALITY UR: 704 MOS/KG — SIGNIFICANT CHANGE UP (ref 300–900)
OSMOLALITY UR: 704 MOS/KG — SIGNIFICANT CHANGE UP (ref 300–900)
PH UR: 5 — SIGNIFICANT CHANGE UP (ref 5–8)
PH UR: 5 — SIGNIFICANT CHANGE UP (ref 5–8)
PHOSPHATE SERPL-MCNC: 4.2 MG/DL — SIGNIFICANT CHANGE UP (ref 2.5–4.5)
PHOSPHATE SERPL-MCNC: 4.2 MG/DL — SIGNIFICANT CHANGE UP (ref 2.5–4.5)
PLATELET # BLD AUTO: 207 K/UL — SIGNIFICANT CHANGE UP (ref 150–400)
PLATELET # BLD AUTO: 207 K/UL — SIGNIFICANT CHANGE UP (ref 150–400)
POTASSIUM SERPL-MCNC: 4 MMOL/L — SIGNIFICANT CHANGE UP (ref 3.5–5.3)
POTASSIUM SERPL-MCNC: 4 MMOL/L — SIGNIFICANT CHANGE UP (ref 3.5–5.3)
POTASSIUM SERPL-SCNC: 4 MMOL/L — SIGNIFICANT CHANGE UP (ref 3.5–5.3)
POTASSIUM SERPL-SCNC: 4 MMOL/L — SIGNIFICANT CHANGE UP (ref 3.5–5.3)
POTASSIUM UR-SCNC: 65 MMOL/L — SIGNIFICANT CHANGE UP
POTASSIUM UR-SCNC: 65 MMOL/L — SIGNIFICANT CHANGE UP
PROT SERPL-MCNC: 8.1 G/DL — SIGNIFICANT CHANGE UP (ref 6–8.3)
PROT SERPL-MCNC: 8.1 G/DL — SIGNIFICANT CHANGE UP (ref 6–8.3)
PROT UR-MCNC: SIGNIFICANT CHANGE UP MG/DL
PROT UR-MCNC: SIGNIFICANT CHANGE UP MG/DL
PROTHROM AB SERPL-ACNC: 13 SEC — SIGNIFICANT CHANGE UP (ref 9.5–13)
PROTHROM AB SERPL-ACNC: 13 SEC — SIGNIFICANT CHANGE UP (ref 9.5–13)
RBC # BLD: 4.2 M/UL — SIGNIFICANT CHANGE UP (ref 4.2–5.8)
RBC # BLD: 4.2 M/UL — SIGNIFICANT CHANGE UP (ref 4.2–5.8)
RBC # FLD: 14.8 % — HIGH (ref 10.3–14.5)
RBC # FLD: 14.8 % — HIGH (ref 10.3–14.5)
RBC CASTS # UR COMP ASSIST: 1 /HPF — SIGNIFICANT CHANGE UP (ref 0–4)
RBC CASTS # UR COMP ASSIST: 1 /HPF — SIGNIFICANT CHANGE UP (ref 0–4)
REVIEW: SIGNIFICANT CHANGE UP
REVIEW: SIGNIFICANT CHANGE UP
SODIUM SERPL-SCNC: 127 MMOL/L — LOW (ref 135–145)
SODIUM SERPL-SCNC: 127 MMOL/L — LOW (ref 135–145)
SODIUM UR-SCNC: 13 MMOL/L — SIGNIFICANT CHANGE UP
SODIUM UR-SCNC: 13 MMOL/L — SIGNIFICANT CHANGE UP
SP GR SPEC: 1.03 — SIGNIFICANT CHANGE UP (ref 1–1.03)
SP GR SPEC: 1.03 — SIGNIFICANT CHANGE UP (ref 1–1.03)
SQUAMOUS # UR AUTO: 1 /HPF — SIGNIFICANT CHANGE UP (ref 0–5)
SQUAMOUS # UR AUTO: 1 /HPF — SIGNIFICANT CHANGE UP (ref 0–5)
UROBILINOGEN FLD QL: 0.2 MG/DL — SIGNIFICANT CHANGE UP (ref 0.2–1)
UROBILINOGEN FLD QL: 0.2 MG/DL — SIGNIFICANT CHANGE UP (ref 0.2–1)
WBC # BLD: 10.58 K/UL — HIGH (ref 3.8–10.5)
WBC # BLD: 10.58 K/UL — HIGH (ref 3.8–10.5)
WBC # FLD AUTO: 10.58 K/UL — HIGH (ref 3.8–10.5)
WBC # FLD AUTO: 10.58 K/UL — HIGH (ref 3.8–10.5)
WBC UR QL: 1 /HPF — SIGNIFICANT CHANGE UP (ref 0–5)
WBC UR QL: 1 /HPF — SIGNIFICANT CHANGE UP (ref 0–5)

## 2023-11-28 PROCEDURE — 99292 CRITICAL CARE ADDL 30 MIN: CPT

## 2023-11-28 PROCEDURE — 99232 SBSQ HOSP IP/OBS MODERATE 35: CPT

## 2023-11-28 PROCEDURE — 99291 CRITICAL CARE FIRST HOUR: CPT

## 2023-11-28 PROCEDURE — 93010 ELECTROCARDIOGRAM REPORT: CPT

## 2023-11-28 PROCEDURE — 71045 X-RAY EXAM CHEST 1 VIEW: CPT | Mod: 26

## 2023-11-28 PROCEDURE — 99291 CRITICAL CARE FIRST HOUR: CPT | Mod: 25

## 2023-11-28 PROCEDURE — 99232 SBSQ HOSP IP/OBS MODERATE 35: CPT | Mod: GC

## 2023-11-28 PROCEDURE — 74176 CT ABD & PELVIS W/O CONTRAST: CPT | Mod: 26

## 2023-11-28 PROCEDURE — 74018 RADEX ABDOMEN 1 VIEW: CPT | Mod: 26

## 2023-11-28 RX ORDER — SIMETHICONE 80 MG/1
80 TABLET, CHEWABLE ORAL ONCE
Refills: 0 | Status: COMPLETED | OUTPATIENT
Start: 2023-11-28 | End: 2023-11-28

## 2023-11-28 RX ORDER — HEPARIN SODIUM 5000 [USP'U]/ML
1300 INJECTION INTRAVENOUS; SUBCUTANEOUS
Qty: 25000 | Refills: 0 | Status: DISCONTINUED | OUTPATIENT
Start: 2023-11-28 | End: 2023-12-25

## 2023-11-28 RX ORDER — SIMETHICONE 80 MG/1
160 TABLET, CHEWABLE ORAL ONCE
Refills: 0 | Status: COMPLETED | OUTPATIENT
Start: 2023-11-28 | End: 2023-11-28

## 2023-11-28 RX ORDER — LACTULOSE 10 G/15ML
30 SOLUTION ORAL EVERY 6 HOURS
Refills: 0 | Status: DISCONTINUED | OUTPATIENT
Start: 2023-11-28 | End: 2023-11-29

## 2023-11-28 RX ORDER — METOCLOPRAMIDE HCL 10 MG
10 TABLET ORAL ONCE
Refills: 0 | Status: COMPLETED | OUTPATIENT
Start: 2023-11-28 | End: 2023-11-28

## 2023-11-28 RX ORDER — UREA 15 G
15 POWDER IN PACKET (EA) ORAL
Refills: 0 | Status: DISCONTINUED | OUTPATIENT
Start: 2023-11-28 | End: 2023-11-30

## 2023-11-28 RX ORDER — METOCLOPRAMIDE HCL 10 MG
10 TABLET ORAL ONCE
Refills: 0 | Status: DISCONTINUED | OUTPATIENT
Start: 2023-11-28 | End: 2023-11-28

## 2023-11-28 RX ORDER — ACETAMINOPHEN 500 MG
1000 TABLET ORAL ONCE
Refills: 0 | Status: COMPLETED | OUTPATIENT
Start: 2023-11-28 | End: 2023-11-28

## 2023-11-28 RX ADMIN — POLYETHYLENE GLYCOL 3350 17 GRAM(S): 17 POWDER, FOR SOLUTION ORAL at 05:41

## 2023-11-28 RX ADMIN — CHLORHEXIDINE GLUCONATE 1 APPLICATION(S): 213 SOLUTION TOPICAL at 21:42

## 2023-11-28 RX ADMIN — Medication 1000 MILLIGRAM(S): at 13:00

## 2023-11-28 RX ADMIN — AMIODARONE HYDROCHLORIDE 200 MILLIGRAM(S): 400 TABLET ORAL at 05:41

## 2023-11-28 RX ADMIN — Medication 100 MILLIGRAM(S): at 15:34

## 2023-11-28 RX ADMIN — Medication 100 MILLIGRAM(S): at 05:41

## 2023-11-28 RX ADMIN — LACTULOSE 30 GRAM(S): 10 SOLUTION ORAL at 04:06

## 2023-11-28 RX ADMIN — Medication 1000 MILLIGRAM(S): at 04:15

## 2023-11-28 RX ADMIN — Medication 81 MILLIGRAM(S): at 12:28

## 2023-11-28 RX ADMIN — ISOSORBIDE DINITRATE 40 MILLIGRAM(S): 5 TABLET ORAL at 12:27

## 2023-11-28 RX ADMIN — SIMETHICONE 160 MILLIGRAM(S): 80 TABLET, CHEWABLE ORAL at 04:06

## 2023-11-28 RX ADMIN — INSULIN GLARGINE 12 UNIT(S): 100 INJECTION, SOLUTION SUBCUTANEOUS at 21:31

## 2023-11-28 RX ADMIN — Medication 1: at 12:28

## 2023-11-28 RX ADMIN — LACTULOSE 30 GRAM(S): 10 SOLUTION ORAL at 05:41

## 2023-11-28 RX ADMIN — CARVEDILOL PHOSPHATE 25 MILLIGRAM(S): 80 CAPSULE, EXTENDED RELEASE ORAL at 05:41

## 2023-11-28 RX ADMIN — ISOSORBIDE DINITRATE 40 MILLIGRAM(S): 5 TABLET ORAL at 05:40

## 2023-11-28 RX ADMIN — HEPARIN SODIUM 13 UNIT(S)/HR: 5000 INJECTION INTRAVENOUS; SUBCUTANEOUS at 13:00

## 2023-11-28 RX ADMIN — Medication 400 MILLIGRAM(S): at 12:32

## 2023-11-28 RX ADMIN — Medication 1: at 18:07

## 2023-11-28 RX ADMIN — CARVEDILOL PHOSPHATE 25 MILLIGRAM(S): 80 CAPSULE, EXTENDED RELEASE ORAL at 18:05

## 2023-11-28 RX ADMIN — Medication 400 MILLIGRAM(S): at 04:05

## 2023-11-28 RX ADMIN — ATORVASTATIN CALCIUM 80 MILLIGRAM(S): 80 TABLET, FILM COATED ORAL at 21:31

## 2023-11-28 RX ADMIN — Medication 10 MILLIGRAM(S): at 04:06

## 2023-11-28 RX ADMIN — Medication 10 MILLIGRAM(S): at 09:43

## 2023-11-28 RX ADMIN — Medication 25 MILLIGRAM(S): at 12:45

## 2023-11-28 RX ADMIN — ISOSORBIDE DINITRATE 40 MILLIGRAM(S): 5 TABLET ORAL at 18:06

## 2023-11-28 RX ADMIN — Medication 100 MILLIGRAM(S): at 21:31

## 2023-11-28 RX ADMIN — Medication 1: at 08:47

## 2023-11-28 NOTE — PROGRESS NOTE ADULT - SUBJECTIVE AND OBJECTIVE BOX
DEVORAH VALENCIA  MRN-[id number]  Patient is a 58y old  Male who presents with a chief complaint of advanced therapies evaluation (2023 17:19)    HPI:  pt seen and approx 1:30 pm  in [facility]    59yo M w/ hx HTN, CAD w/ 1 stent in , ICH () presenting with abn ekg. Patient presented to [facility] where he was found to have STEMI, recommended to get cath however patient did not want to get it there so it left and came here.  Patient initially had cough, congestion, fever, was placed on antibiotics on .  Started feeling nauseous and had a presyncopal event after which he presented to ED last night.  Had chest pain as well.  Chest pain is midsternal.  Not currently having chest pain.  Received 4 aspirin 30 min pta. (2023 15:11)      Hospital Course:    24 HOUR EVENTS:    REVIEW OF SYSTEMS:    CONSTITUTIONAL: No weakness, fevers or chills  EYES/ENT: No visual changes;  No vertigo or throat pain   NECK: No pain or stiffness  RESPIRATORY: No cough, wheezing, hemoptysis; No shortness of breath  CARDIOVASCULAR: No chest pain or palpitations  GASTROINTESTINAL: No abdominal or epigastric pain. No nausea, vomiting, or hematemesis; No diarrhea or constipation. No melena or hematochezia.  GENITOURINARY: No dysuria, frequency or hematuria  NEUROLOGICAL: No numbness or weakness  SKIN: No itching, rashes      ICU Vital Signs Last 24 Hrs  T(C): 36.6 (2023 19:00), Max: 37.3 (2023 15:00)  T(F): 97.8 (2023 19:00), Max: 99.1 (2023 15:00)  HR: 79 (2023 21:00) (73 - 82)  BP: --  BP(mean): --  ABP: --  ABP(mean): --  RR: 17 (2023 21:00) (13 - 24)  SpO2: 97% (2023 21:00) (95% - 98%)    O2 Parameters below as of 2023 21:00  Patient On (Oxygen Delivery Method): room air            CVP(mm Hg): --  CO: --  CI: --  PA: --  PA(mean): --  PA(direct): --  PCWP: --  LA: --  RA: --  SVR: --  SVRI: --  PVR: --  PVRI: --  I&O's Summary    2023 07:01  -  2023 07:00  --------------------------------------------------------  IN: 1369 mL / OUT: 1925 mL / NET: -556 mL    2023 07:01  -  2023 21:48  --------------------------------------------------------  IN: 522 mL / OUT: 375 mL / NET: 147 mL        CAPILLARY BLOOD GLUCOSE    CAPILLARY BLOOD GLUCOSE      POCT Blood Glucose.: 213 mg/dL (2023 21:27)      PHYSICAL EXAM:  GENERAL: No acute distress, well-developed  HEAD:  Atraumatic, Normocephalic  EYES: EOMI, PERRLA, conjunctiva and sclera clear  NECK: Supple, no lymphadenopathy, no JVD  CHEST/LUNG: CTAB; No wheezes, rales, or rhonchi  HEART: Regular rate and rhythm. Normal S1/S2. No murmurs, rubs, or gallops  ABDOMEN: Soft, non-tender, non-distended; normal bowel sounds, no organomegaly  EXTREMITIES:  2+ peripheral pulses b/l, No clubbing, cyanosis, or edema  NEUROLOGY: A&O x 3, no focal deficits  SKIN: No rashes or lesions    ============================I/O===========================   I&O's Detail    2023 07:  -  2023 07:00  --------------------------------------------------------  IN:    Heparin: 299 mL    IV PiggyBack: 100 mL    Oral Fluid: 720 mL    Sodium Chloride 0.9% Bolus: 250 mL  Total IN: 1369 mL    OUT:    Voided (mL): 1925 mL  Total OUT: 1925 mL    Total NET: -556 mL      2023 07:01  -  2023 21:48  --------------------------------------------------------  IN:    Heparin: 26 mL    Heparin: 156 mL    IV PiggyBack: 100 mL    Oral Fluid: 240 mL  Total IN: 522 mL    OUT:    Voided (mL): 375 mL  Total OUT: 375 mL    Total NET: 147 mL        ============================ LABS =========================                        12.5   10.58 )-----------( 207      ( 2023 02:25 )             36.7         127<L>  |  97  |  38<H>  ----------------------------<  206<H>  4.0   |  16<L>  |  1.52<H>    Ca    10.1      2023 02:25  Phos  4.2       Mg     2.1         TPro  8.1  /  Alb  4.0  /  TBili  0.4  /  DBili  x   /  AST  31  /  ALT  49<H>  /  AlkPhos  84                  LIVER FUNCTIONS - ( 2023 02:25 )  Alb: 4.0 g/dL / Pro: 8.1 g/dL / ALK PHOS: 84 U/L / ALT: 49 U/L / AST: 31 U/L / GGT: x           PT/INR - ( 2023 02:25 )   PT: 13.0 sec;   INR: 1.19 ratio         PTT - ( 2023 02:25 )  PTT:78.8 sec    Lactate, Blood: 1.4 mmol/L (23 @ 03:09)  Lactate, Blood: 1.4 mmol/L (23 @ 04:30)    Urinalysis Basic - ( 2023 16:58 )    Color: Yellow / Appearance: Clear / S.029 / pH: x  Gluc: x / Ketone: Negative mg/dL  / Bili: Negative / Urobili: 0.2 mg/dL   Blood: x / Protein: Trace mg/dL / Nitrite: Negative   Leuk Esterase: Negative / RBC: 1 /HPF / WBC 1 /HPF   Sq Epi: x / Non Sq Epi: 1 /HPF / Bacteria: Negative /HPF      ======================Micro/Rad/Cardio=================  Telemtry: Reviewed   EKG: Reviewed  CXR: Reviewed  Culture: Reviewed   Echo:   Cath: Cardiac Cath Lab - Adult:   [facility]  Department of Cardiology  [address] [place] [address]  [phone]  Cath Lab Report -- Comprehensive Report  Patient: LD VALENCIA  Study date: 2017  Account number: [id number]  MR number: [id number]  : 1965  Gender: Male  Race: W  Case Physician(s):  Jairon Holguin M.D.  Referring Physician:  Luc Lynn M.D.  INDICATIONS: Unstable angina - CCS4.  HISTORY: The patient has a history of coronary artery disease. The patient  hashypertension and medication-treated dyslipidemia.  PROCEDURE:  --  Left heart catheterization with ventriculography.  --  Left coronary angiography.  --  Right coronary angiography.  TECHNIQUE: The risks and alternatives of the procedures and conscious  sedation were explained to the patient and informed consent was obtained.  Cardiac catheterization performed electively.  Local anesthetic given. Right radial artery access. A 6FR PRELUDE KIT was  inserted in the vessel. Left heart catheterization. Ventriculography was  performed. A 5FR FR4.0 EXPO catheter was utilized. Left coronary artery  angiography. The vessel was injected utilizing a 5FR FL3.5 EXPO catheter.  Right coronary artery angiography. The vessel was injected utilizing a 5FR  FR4.0 EXPO catheter. RADIATION EXPOSURE: 1.1 min.  CONTRAST GIVEN: Omnipaque 55 ml.  MEDICATIONS GIVEN: Midazolam, 1 mg, IV. Fentanyl, 25 mcg, IV. Verapamil  (Isoptin, Calan, Covera), 2.5 mg, IA. Heparin, 3000 units, IA.  VENTRICLES: Global left ventricular function was moderately depressed. EF  estimated was 40 %.  CORONARY VESSELS: The coronary circulation is right dominant.  LM:   --  LM: Normal.  LAD:   --  Proximal LAD: There was a 50 % stenosis.  CX:   --  Circumflex: Normal.  RCA:   --  Mid RCA: There was a 40 % stenosis.  --  Distal RCA: There was a 50 % stenosis.  COMPLICATIONS: There were no complications.  DIAGNOSTIC RECOMMENDATIONS: The patient should continue with the present  medications.  Prepared and signed by  Jairon Holguin M.D.  Signed 2017 12:20:13  HEMODYNAMIC TABLES  Pressures:  Baseline/ Room Air  Pressures:  - HR: 78  Pressures:  - Rhythm:  Pressures:  -- Aortic Pressure (S/D/M): --/--/99  Pressures:  -- Left Ventricle (s/edp): 157/39/--  Outputs:  Baseline/ Room Air  Outputs:  -- CALCULATIONS: Age in years: 51.41  Outputs:  -- CALCULATIONS: Body Surface Area: 2.05  Outputs:  -- CALCULATIONS: Height in cm: 175.00  Outputs:  -- CALCULATIONS: Sex: Male  Outputs:  -- CALCULATIONS: Weight in k.40 (17 @ 21:55)    ======================================================  PAST MEDICAL & SURGICAL HISTORY:  HTN (hypertension)      CAD (coronary artery disease)  ; stent      Intracranial hemorrhage        Respiratory arrest        Myocardial infarction, unspecified MI type, unspecified artery      History of coronary artery stent placement        ====================ASSESSMENT ==============  58 male with HTN, CAD (s/p PCI ), HFrEF, CVA , and T2DM presenting with chest pressure and unknown tachycardia that was shocked x1, [facility]  found to have in-stent restenosis of pLAD and  of RCA with elevated RA and PA pressures and severely decreased EF 32%. Admitted to CICU for management of cardiogenic shock and ADHF requiring IABP  -, with hospital course c/b vfib arrest requiring reinsertion of IABP. Currently undergoing workup for AT evaluation with HF.    Plan:  ====================== NEUROLOGY=====================  Anxiety  - No Seroquel/antipsychotics since vfib arrest and prolonged QTC  - Psych Eval, recommended SSRI, but pt. refused   - Psych recommending atarax PRN  - Continue to monitor mental status    PT/Conditioning  - Continue band exercised while on bedrest s/t IABP, IS    ==================== RESPIRATORY======================  Acute Hypoxemic Respiratory Failure  - s/p x2 intubations for cardiogenic pulm edema and the in setting of cardiac arrest, resolved - extubated 11/10  - Currently on room air with spO2 mid 90s  - Continue incentive spirometry and monitoring of sp02    Asthma  - c/w albuterol, symbicort and spiriva  - On trelegy at home    ====================CARDIOVASCULAR====================  Vfib arrest i/s/o ischemia  - Lido gtt off   - PO Amio load - total of 5g per EP complete , continue PO amio  - Keep K > 4, Mag > 2.2     Cardiogenic shock requiring IABP (- , -)  - Likely 2/2 NSTEMI and ADHF  -  [facility]: pLAD 100 % in-stent restenosis & mRCA, 100 %. PCWP 30. IABP placed.  -  TTE: LV dilated. EF 32 %. Regional WMAs present, mod (grade 2) LV diastolic dysfunction  -  TTE: EF 22% and + LV thrombus  - PRN Bumex IVP as needed, appears euvolemic on exam  - IABP swapped  to RFA, continue 1:1 support  - Off Milrinone gtt @ 7:30 am   - Currently on hydralazine 100 TID and ISD 40 TID for AL reduction  - [place] d/c'd due to elevated K levels  - c/w coreg 25 BID for GDMT  - Listed OHT status 2 , f/u HF recs    NSTEMI iso stent re-occlusion of pLAD and 100%  of RCA  - EKG on admission w/LBBB  - DAPT: c/w ASA, Brilinta d/c'd per transplant w/u  - c/w lipitor 80  - cMR deferred given necessity of IABP  - CT sx not recommending CABG, undergoing AT eval    LV thrombus  - c/w heparin gtt    ===================== RENAL =========================  Non-oliguric ARIC   - sCr 1.91 today, Baseline Cr: 1-1.22  - Renal US - no evidence of renal artery stenosis  - Trend BMP, lytes daily, replace as needed  - Continue Strict I/Os, avoid nephrotoxins    Mild Hyponatremia/Hyperkalemia  - iso ARIC and or new CKD baseline  - continue fluid restriction     ==================== GASTROINTESTINAL===================  Constipation/ ? ileus  - f/u CT abdomen read  - now NPO  - continue lactulose q4h  - monitor for BM    ===================HEMATOLOGIC/ONC ===================  VTE prophylaxis   - c/w heparin gtt given LV Thrombus and IABP  - H/H and platelets stable, continue to trend daily    =======================    ENDOCRINE  =====================  Type 2 DM  - A1c 8.3, glucose   - Continue lantus, premeal, low ISS  - continue FS ACHS    ========================INFECTIOUS DISEASE================  Enterococcus faecalis bacteremia  - BCx + for enterococcus faecalis x2, Staph epi x1 (likely contaminant)- pan sensitive   - Urine cx  + enterococcus faecalis  - BCx  NGTD  - IABP site swapped to RFA   - continue vancomycin 1g q12h (- stop  per ID)  - CT A/P negative for infectious pathology    COVID, resolved  - Off airborne precautions     Pre-transplant ID w/u   - Trend ID recs for serologies   - Colonoscopy  - normal   - Chest CT  - improved LLL aeration  - Pt. requiring vaccines prior to transplant, would initiate series accordingly  - Received hep A and hep B vaccines thus far    Lines: R femoral IABP (-        Patient requires continuous monitoring with bedside rhythm monitoring, pulse ox monitoring, and intermittent blood gas analysis. Care plan discussed with ICU care team. Patient remained critical and at risk for life threatening decompensation.  Patient seen, examined and plan discussed with CCU team during rounds.     I have personally provided ____ minutes of critical care time excluding time spent on separate procedures, in addition to initial critical care time provided by the CICU Attending, Dr. Delacruz    By signing my name below, I, Rosalba Tapia, attest that this documentation has been prepared under the direction and in the presence of KARYN Washburn   Electronically signed: Rosalba Phillips, 23 @ 21:48    I, Laura Mcclendon, personally performed the services described in this documentation. all medical record entries made by the eileenibmartha were at my direction and in my presence. I have reviewed the chart and agree that the record reflects my personal performance and is accurate and complete  Electronically signed: KARYN Washburn        DEVORAH VALENCIA  MRN-[id number]  Patient is a 58y old  Male who presents with a chief complaint of advanced therapies evaluation (2023 17:19)    HPI:  59yo M w/ hx HTN, CAD w/ 1 stent in , ICH () presenting with abn ekg. Patient presented to [facility] where he was found to have STEMI, recommended to get cath however patient did not want to get it there so it left and came here.  Patient initially had cough, congestion, fever, was placed on antibiotics on .  Started feeling nauseous and had a presyncopal event after which he presented to ED last night.  Had chest pain as well.  Chest pain is midsternal.  Not currently having chest pain.  Received 4 aspirin 30 min pta. (2023 15:11)    24 HOUR EVENTS:  - +BM    ICU Vital Signs Last 24 Hrs  T(C): 36.6 (2023 19:00), Max: 37.3 (2023 15:00)  T(F): 97.8 (2023 19:00), Max: 99.1 (2023 15:00)  HR: 79 (2023 21:00) (73 - 82)  RR: 17 (2023 21:00) (13 - 24)  SpO2: 97% (2023 21:00) (95% - 98%)    O2 Parameters below as of 2023 21:00  Patient On (Oxygen Delivery Method): room air    I&O's Summary    2023 07:  -  2023 07:00  --------------------------------------------------------  IN: 1369 mL / OUT: 1925 mL / NET: -556 mL    2023 07:01  -  2023 21:48  --------------------------------------------------------  IN: 522 mL / OUT: 375 mL / NET: 147 mL    CAPILLARY BLOOD GLUCOSE  POCT Blood Glucose.: 213 mg/dL (2023 21:27)    PHYSICAL EXAM:  GENERAL: No acute distress, well-developed  CHEST/LUNG: CTAB; No wheezes, rales, or rhonchi  HEART: Regular rate and rhythm. Normal S1/S2. No murmurs, rubs, or gallops  ABDOMEN: Soft, non-tender, non-distended; normal bowel sounds  EXTREMITIES:  2+ peripheral pulses b/l, No clubbing, cyanosis, or edema  NEUROLOGY: A&O x 3, no focal deficits    ============================I/O===========================   I&O's Detail    2023 07:01  -  2023 07:00  --------------------------------------------------------  IN:    Heparin: 299 mL    IV PiggyBack: 100 mL    Oral Fluid: 720 mL    Sodium Chloride 0.9% Bolus: 250 mL  Total IN: 1369 mL    OUT:    Voided (mL): 1925 mL  Total OUT: 1925 mL    Total NET: -556 mL      2023 07:01  -  2023 21:48  --------------------------------------------------------  IN:    Heparin: 26 mL    Heparin: 156 mL    IV PiggyBack: 100 mL    Oral Fluid: 240 mL  Total IN: 522 mL    OUT:    Voided (mL): 375 mL  Total OUT: 375 mL    Total NET: 147 mL    ============================ LABS =========================                        12.5   10.58 )-----------( 207      ( 2023 02:25 )             36.7         127<L>  |  97  |  38<H>  ----------------------------<  206<H>  4.0   |  16<L>  |  1.52<H>    Ca    10.1      2023 02:25  Phos  4.2       Mg     2.1         TPro  8.1  /  Alb  4.0  /  TBili  0.4  /  DBili  x   /  AST  31  /  ALT  49<H>  /  AlkPhos  84        LIVER FUNCTIONS - ( 2023 02:25 )  Alb: 4.0 g/dL / Pro: 8.1 g/dL / ALK PHOS: 84 U/L / ALT: 49 U/L / AST: 31 U/L / GGT: x           PT/INR - ( 2023 02:25 )   PT: 13.0 sec;   INR: 1.19 ratio         PTT - ( 2023 02:25 )  PTT:78.8 sec    Lactate, Blood: 1.4 mmol/L (23 @ 03:09)  Lactate, Blood: 1.4 mmol/L (23 @ 04:30)    Urinalysis Basic - ( 2023 16:58 )    Color: Yellow / Appearance: Clear / S.029 / pH: x  Gluc: x / Ketone: Negative mg/dL  / Bili: Negative / Urobili: 0.2 mg/dL   Blood: x / Protein: Trace mg/dL / Nitrite: Negative   Leuk Esterase: Negative / RBC: 1 /HPF / WBC 1 /HPF   Sq Epi: x / Non Sq Epi: 1 /HPF / Bacteria: Negative /HPF      ======================Micro/Rad/Cardio=================  Telemtry: Reviewed   EKG: Reviewed  CXR: Reviewed  Culture: Reviewed   Echo:   Cath: Cardiac Cath Lab - Adult:   [facility]  Department of Cardiology  [address] [place] [id number]  [phone]  Cath Lab Report -- Comprehensive Report  Patient: LD VALENCIA  Study date: 2017  Account number: [id number]  MR number: [id number]  : 1965  Gender: Male  Race: W  Case Physician(s):  Jairon Holguin M.D.  Referring Physician:  Luc Lynn M.D.  INDICATIONS: Unstable angina - CCS4.  HISTORY: The patient has a history of coronary artery disease. The patient  hashypertension and medication-treated dyslipidemia.  PROCEDURE:  --  Left heart catheterization with ventriculography.  --  Left coronary angiography.  --  Right coronary angiography.  TECHNIQUE: The risks and alternatives of the procedures and conscious  sedation were explained to the patient and informed consent was obtained.  Cardiac catheterization performed electively.  Local anesthetic given. Right radial artery access. A 6FR PRELUDE KIT was  inserted in the vessel. Left heart catheterization. Ventriculography was  performed. A 5FR FR4.0 EXPO catheter was utilized. Left coronary artery  angiography. The vessel was injected utilizing a 5FR FL3.5 EXPO catheter.  Right coronary artery angiography. The vessel was injected utilizing a 5FR  FR4.0 EXPO catheter. RADIATION EXPOSURE: 1.1 min.  CONTRAST GIVEN: Omnipaque 55 ml.  MEDICATIONS GIVEN: Midazolam, 1 mg, IV. Fentanyl, 25 mcg, IV. Verapamil  (Isoptin, Calan, Covera), 2.5 mg, IA. Heparin, 3000 units, IA.  VENTRICLES: Global left ventricular function was moderately depressed. EF  estimated was 40 %.  CORONARY VESSELS: The coronary circulation is right dominant.  LM:   --  LM: Normal.  LAD:   --  Proximal LAD: There was a 50 % stenosis.  CX:   --  Circumflex: Normal.  RCA:   --  Mid RCA: There was a 40 % stenosis.  --  Distal RCA: There was a 50 % stenosis.  COMPLICATIONS: There were no complications.  DIAGNOSTIC RECOMMENDATIONS: The patient should continue with the present  medications.  Prepared and signed by  Jairon Holguin M.D.  Signed 2017 12:20:13  HEMODYNAMIC TABLES  Pressures:  Baseline/ Room Air  Pressures:  - HR: 78  Pressures:  - Rhythm:  Pressures:  -- Aortic Pressure (S/D/M): --/--/99  Pressures:  -- Left Ventricle (s/edp): 157/39/--  Outputs:  Baseline/ Room Air  Outputs:  -- CALCULATIONS[age]  Outputs:  -- CALCULATIONS: Body Surface Area: 2.05  Outputs:  -- CALCULATIONS: Height in cm: 175.00  Outputs:  -- CALCULATIONS: Sex: Male  Outputs:  -- CALCULATIONS: Weight in k.40 (17 @ 21:55)    ======================================================  PAST MEDICAL & SURGICAL HISTORY:  HTN (hypertension)      CAD (coronary artery disease)  ; stent      Intracranial hemorrhage        Respiratory arrest        Myocardial infarction, unspecified MI type, unspecified artery      History of coronary artery stent placement        ====================ASSESSMENT ==============  58 male with HTN, CAD (s/p PCI ), HFrEF, CVA , and T2DM presenting with chest pressure and unknown tachycardia that was shocked x1, [facility]  found to have in-stent restenosis of pLAD and  of RCA with elevated RA and PA pressures and severely decreased EF 32%. Admitted to CICU for management of cardiogenic shock and ADHF requiring IABP  -, with hospital course c/b vfib arrest requiring reinsertion of IABP. Currently undergoing workup for AT evaluation with HF.    Plan:  ====================== NEUROLOGY=====================  Anxiety  - No Seroquel/antipsychotics since vfib arrest and prolonged QTC  - Psych Eval, recommended SSRI, but pt. refused   - Psych recommending atarax PRN  - Continue to monitor mental status    PT/Conditioning  - Continue band exercised while on bedrest s/t IABP, IS    ==================== RESPIRATORY======================  Acute Hypoxemic Respiratory Failure  - s/p x2 intubations for cardiogenic pulm edema and the in setting of cardiac arrest, resolved - extubated 11/10  - Currently on room air with spO2 mid 90s  - Continue incentive spirometry and monitoring of sp02    Asthma  - c/w albuterol, symbicort and spiriva  - On trelegy at home    ====================CARDIOVASCULAR====================  Vfib arrest i/s/o ischemia  - Lido gtt off   - PO Amio load - total of 5g per EP complete , continue PO amio  - Keep K > 4, Mag > 2.2     Cardiogenic shock requiring IABP (- , -)  - Likely 2/2 NSTEMI and ADHF  -  [facility]: pLAD 100 % in-stent restenosis & mRCA, 100 %. PCWP 30. IABP placed.  -  TTE: LV dilated. EF 32 %. Regional WMAs present, mod (grade 2) LV diastolic dysfunction  -  TTE: EF 22% and + LV thrombus  - PRN Bumex IVP as needed, appears euvolemic on exam  - IABP swapped  to RFA, continue 1:1 support  - Off Milrinone gtt @ 7:30 am   - Currently on hydralazine 100 TID and ISD 40 TID for AL reduction  - [place] d/c'd due to elevated K levels  - c/w coreg 25 BID for GDMT  - Listed OHT status 2 , f/u HF recs    NSTEMI iso stent re-occlusion of pLAD and 100%  of RCA  - EKG on admission w/LBBB  - DAPT: c/w ASA, Brilinta d/c'd per transplant w/u  - c/w lipitor 80  - cMR deferred given necessity of IABP  - CT sx not recommending CABG, undergoing AT eval    LV thrombus  - c/w heparin gtt    ===================== RENAL =========================  Non-oliguric ARIC   - sCr 1.91 today, Baseline Cr: 1-1.22  - Renal US - no evidence of renal artery stenosis  - Trend BMP, lytes daily, replace as needed  - Continue Strict I/Os, avoid nephrotoxins    Mild Hyponatremia/Hyperkalemia  - iso ARIC and or new CKD baseline  - continue fluid restriction     ==================== GASTROINTESTINAL===================  Constipation  - CT abd without obstruction  - clear liquid diet  - continue lactulose q4h  - monitor for BM    ===================HEMATOLOGIC/ONC ===================  VTE prophylaxis   - c/w heparin gtt given LV Thrombus and IABP  - H/H and platelets stable, continue to trend daily    =======================    ENDOCRINE  =====================  Type 2 DM  - A1c 8.3, glucose   - Continue lantus, premeal, low ISS  - continue FS ACHS    ========================INFECTIOUS DISEASE================  Enterococcus faecalis bacteremia  - BCx + for enterococcus faecalis x2, Staph epi x1 (likely contaminant)- pan sensitive   - Urine cx  + enterococcus faecalis  - BCx  NGTD  - IABP site swapped to RFA   - continue vancomycin 1g q12h (- stop  per ID)  - CT A/P negative for infectious pathology    COVID, resolved  - Off airborne precautions     Pre-transplant ID w/u   - Trend ID recs for serologies   - Colonoscopy  - normal   - Chest CT  - improved LLL aeration  - Pt. requiring vaccines prior to transplant, would initiate series accordingly  - Received hep A and hep B vaccines thus far    Lines: R femoral IABP (-        Patient requires continuous monitoring with bedside rhythm monitoring, pulse ox monitoring, and intermittent blood gas analysis. Care plan discussed with ICU care team. Patient remained critical and at risk for life threatening decompensation.  Patient seen, examined and plan discussed with CCU team during rounds.     I have personally provided 30 minutes of critical care time excluding time spent on separate procedures, in addition to initial critical care time provided by the CICU Attending, Dr. Delacruz    By signing my name below, I, Rosalba Tapia, attest that this documentation has been prepared under the direction and in the presence of KARYN Washburn   Electronically signed: Rosalba Phillips, 23 @ 21:48    I, Laura Mcclendon, personally performed the services described in this documentation. all medical record entries made by the scribe were at my direction and in my presence. I have reviewed the chart and agree that the record reflects my personal performance and is accurate and complete  Electronically signed: KARYN Washburn

## 2023-11-28 NOTE — PROGRESS NOTE ADULT - ASSESSMENT
59 yo M with PMHx of HTN, CAD w/ 1 stent in 2009, ICH (2008) presented on 11/1 with abn ekg. Patient presented to [facility] where he was found to have STEMI, recommended to get cath however patient did not want to get it there so he left and came here.   EKG here with ST depression in lateral leads and elevation in anterior leads   Prior to [facility] found to be tachycardic, dyspneic, intubated   C 11/1 with chronic total occlusion of LAD and RCA, with elevated RA and PA pressures  TTE 11/1 with severely decreased EF 32%, s/p IABP 11/1    RVP (11/1) Positive for COVID19  RVP (11/10) Negative  BCx (11/2, 11/4) NGTD  ETT Culture (11/2) NGTD  MRSA/MSSA PCR (11/2, 11/10) Negative  UA (11/10) 1 WBC    CXR (11/10) Clear Lungs  TTE (11/2) Left ventricular thrombus.    #Enterococcus Bacteremia, Leukocytosis, Pre-Heart Transplant Evaluation Serologies  Concern for either central line or abdominal source (urine also positive)  CT A/P Noncontrast (11/18) No acute process  Given mixed cultures with Staph epi and Enterococcus faecalis would continue Vancomycin   Patient's penicillin allergy is questionable and patient does not recall reaction.   Anticipate we can utilize Ampicillin in the future with close monitoring or arrange skin challenge for definitive clearance   -- removal/exchange of Balloon Pump done on 11/21 completed a course of IV Vancomycin  --Continue to monitor renal function and vancomycin levels to avoid nephrotoxicity / ototoxicity secondary to vancomycin level 11/21 therapeutic range  --Continue to follow CBC with diff  --Continue to follow temperature curve  --Follow up on susceptibility of Enterococcus faecalis shows Vanco and Ampi sensitive  --Follow up on repeat blood cultures from 11/18 are negative  -- Completing ten-14 days of IV Vancomycin    Given evidence of clearing of bacteremia could move forward from an ID perspective to pursue advanced cardiac therapies- listed for transplant status 2E      #Encounter to Vaccinate Patient  COVID19: COVID19 Infection This Admission. Would consider Vaccination in ~3 months  Influenza: declined  Pneumococcal: Would benefit from PCV20  HAV: received first dose 11/24  HBV: received first dose Heplisav 11/24  MMR: Not Mumps Immune, if >1 month until transplant would consider MMR dose  Varicella: Immune  Shingles: recommend Shingrix series  Tdap: received Tdap booster this admission      Chao Peters MD  Can be called via Teams  After 5pm/weekends [phone]     59 yo M with PMHx of HTN, CAD w/ 1 stent in 2009, ICH (2008) presented on 11/1 with abn ekg. Patient presented to [facility] where he was found to have STEMI, recommended to get cath however patient did not want to get it there so he left and came here.   EKG here with ST depression in lateral leads and elevation in anterior leads   Prior to [facility] found to be tachycardic, dyspneic, intubated   [facility] 11/1 with chronic total occlusion of LAD and RCA, with elevated RA and PA pressures  TTE 11/1 with severely decreased EF 32%, s/p IABP 11/1    RVP (11/1) Positive for COVID19  RVP (11/10) Negative  BCx (11/2, 11/4) NGTD  ETT Culture (11/2) NGTD  MRSA/MSSA PCR (11/2, 11/10) Negative  UA (11/10) 1 WBC    CXR (11/10) Clear Lungs  TTE (11/2) Left ventricular thrombus.    #Enterococcus Bacteremia, Leukocytosis, Pre-Heart Transplant Evaluation Serologies  Concern for either central line or abdominal source (urine also positive)  CT A/P Noncontrast (11/18) No acute process  Given mixed cultures with Staph epi and Enterococcus faecalis would continue Vancomycin   Patient's penicillin allergy is questionable and patient does not recall reaction.   Anticipate we can utilize Ampicillin in the future with close monitoring or arrange skin challenge for definitive clearance   -- removal/exchange of Balloon Pump done on 11/21 completed a course of IV Vancomycin  --Continue to monitor renal function and vancomycin levels to avoid nephrotoxicity / ototoxicity secondary to vancomycin level 11/21 therapeutic range  --Continue to follow CBC with diff  --Continue to follow temperature curve  --Follow up on susceptibility of Enterococcus faecalis shows Vanco and Ampi sensitive  --Follow up on repeat blood cultures from 11/18 are negative  -- Completing ten-14 days of IV Vancomycin--repeat Vanco level to determine timing of next dose    Given evidence of clearing of bacteremia could move forward from an ID perspective to pursue advanced cardiac therapies- listed for transplant status 2E      #Encounter to Vaccinate Patient  COVID19: COVID19 Infection This Admission. Would consider Vaccination in ~3 months  Influenza: declined  Pneumococcal: Would benefit from PCV20  HAV: received first dose 11/24  HBV: received first dose Heplisav 11/24  MMR: Not Mumps Immune, if >1 month until transplant would consider MMR dose  Varicella: Immune  Shingles: recommend Shingrix series  Tdap: received Tdap booster this admission      Chao Peters MD  Can be called via Teams  After 5pm/weekends [phone]

## 2023-11-28 NOTE — PROGRESS NOTE ADULT - PROBLEM SELECTOR PLAN 4
- Cr on admission 1.2, peaked to 4  - Was stable around 1.4-1.5; today 1.5 from 1.9 s/p gentle hydration. Plan as above  - Support as above.  - Would engage CardioRenal given downtrend in BiCarb

## 2023-11-28 NOTE — PROGRESS NOTE ADULT - ATTENDING COMMENTS
Yesterday's SCr bump to 1.91 was lab error.    Hyponatremia- 127- MUST stop D5 diluent sources as possible- heparin, vancomycin,   Need repeat urine Na, K, Osm  Jennings see above

## 2023-11-28 NOTE — PROGRESS NOTE ADULT - SUBJECTIVE AND OBJECTIVE BOX
LD VALENCIA  58y Male  MRN:[id number]    Patient is a 58y old  Male who presents with a chief complaint of NSTEMI (01 Nov 2023 20:29)    HPI:  59yo M w/ hx HTN, CAD w/ 1 stent in 2009, ICH (2008) presenting with abn ekg. Patient presented to [facility] where he was found to have STEMI, recommended to get cath however patient did not want to get it there so it left and came here.  Patient initially had cough, congestion, fever, was placed on antibiotics on Sunday.  Started feeling nauseous and had a presyncopal event after which he presented to ED last night.  Had chest pain as well.  Chest pain is midsternal.  Not currently having chest pain.  Received 4 aspirin 30 min pta. (01 Nov 2023 15:11)      Patient seen and evaluated at bedside in CCU. interval events noted    Interval HPI: remain in ccu. IABP in place   vomiting o/n    PAST MEDICAL & SURGICAL HISTORY:  HTN (hypertension)      CAD (coronary artery disease)  2009; stent      Intracranial hemorrhage  2008      Respiratory arrest  december 1st      Myocardial infarction, unspecified MI type, unspecified artery      History of coronary artery stent placement          REVIEW OF SYSTEMS:  as per hpi     VITALS:   ICU Vital Signs Last 24 Hrs  T(C): 37.3 (28 Nov 2023 15:00), Max: 37.3 (28 Nov 2023 15:00)  T(F): 99.1 (28 Nov 2023 15:00), Max: 99.1 (28 Nov 2023 15:00)  HR: 82 (28 Nov 2023 19:00) (73 - 82)  BP: --  BP(mean): --  ABP: --  ABP(mean): --  RR: 18 (28 Nov 2023 19:00) (13 - 24)  SpO2: 95% (28 Nov 2023 19:00) (95% - 98%)    O2 Parameters below as of 28 Nov 2023 19:00  Patient On (Oxygen Delivery Method): room air                 PHYSICAL EXAM:  GENERAL: NAD, comfortable   HEAD:  Atraumatic, Normocephalic  EYES: EOMI, PERRLA, conjunctiva and sclera clear  NECK: Supple, No JVD   CHEST/LUNG: Clear to auscultation bilaterally; No wheeze  HEART: Regular rate and rhythm; No murmurs, rubs, or gallops  ABDOMEN: Soft, Nontender, Nondistended; Bowel sounds present  EXTREMITIES:  2+ Peripheral Pulses, No clubbing, cyanosis, or edema  NEUROLOGY: nonfocal  SKIN: No rashes or lesions  +iabp    Consultant(s) Notes Reviewed:  [x ] YES  [ ] NO  Care Discussed with Consultants/Other Providers [ x] YES  [ ] NO    MEDS:   MEDICATIONS  (STANDING):  aMIOdarone    Tablet   Oral   aMIOdarone    Tablet 200 milliGRAM(s) Oral daily  aspirin  chewable 81 milliGRAM(s) Oral daily  atorvastatin 80 milliGRAM(s) Oral at bedtime  budesonide  80 MICROgram(s)/formoterol 4.5 MICROgram(s) Inhaler 2 Puff(s) Inhalation two times a day  carvedilol 25 milliGRAM(s) Oral every 12 hours  chlorhexidine 2% Cloths 1 Application(s) Topical daily  dextrose 50% Injectable 25 Gram(s) IV Push once  dextrose 50% Injectable 12.5 Gram(s) IV Push once  heparin  Infusion 1300 Unit(s)/Hr (13 mL/Hr) IV Continuous <Continuous>  hydrALAZINE 100 milliGRAM(s) Oral three times a day  insulin glargine Injectable (LANTUS) 12 Unit(s) SubCutaneous at bedtime  insulin lispro (ADMELOG) corrective regimen sliding scale   SubCutaneous three times a day before meals  insulin lispro (ADMELOG) corrective regimen sliding scale   SubCutaneous at bedtime  insulin lispro Injectable (ADMELOG) 8 Unit(s) SubCutaneous three times a day before meals  isosorbide   dinitrate Tablet (ISORDIL) 40 milliGRAM(s) Oral three times a day  lactulose Syrup 30 Gram(s) Oral every 6 hours  polyethylene glycol 3350 17 Gram(s) Oral two times a day  senna 2 Tablet(s) Oral at bedtime  urea Oral Powder 15 Gram(s) Oral two times a day  zoster Vaccine recombinant (SHINGRIX) 0.5 milliLiter(s) IntraMuscular once    MEDICATIONS  (PRN):  hydrOXYzine hydrochloride 25 milliGRAM(s) Oral two times a day PRN Anxiety    ALLERGIES:  penicillins (Unknown)      LABS:                                                           12.5   10.58 )-----------( 207      ( 28 Nov 2023 02:25 )             36.7   11-28    127<L>  |  97  |  38<H>  ----------------------------<  206<H>  4.0   |  16<L>  |  1.52<H>    Ca    10.1      28 Nov 2023 02:25  Phos  4.2     11-28  Mg     2.1     11-28    TPro  8.1  /  Alb  4.0  /  TBili  0.4  /  DBili  x   /  AST  31  /  ALT  49<H>  /  AlkPhos  84  11-28    < from: CT Abdomen and Pelvis No Cont (11.28.23 @ 03:38) >  IMPRESSION:  Mildly dilated colon and prominent but not overly dilated small bowel   with air-fluid levels. No discrete transition point. Findings are   suggestive of ileus.    Intra-aortic balloon pump with the inferior marker at the level of the   inferior mesenteric artery and the balloon overlying the origins of the   renal arteries, celiac artery, and superior mesenteric artery. Consider   repositioning. Concern for IABP positioning was discussed with LANETTE Hawthorne   on 11/28/2023 at 3:42 AM by Dr. Shepard.    < end of copied text >          < from: Colonoscopy (11.17.23 @ 10:35) >                                                                                                        Impression:          - The entire examined colon is normal on direct and retroflexion views.                       - No specimens collected.  Recommendation:      - Resume previous diet today.                       - No large polyps or masses detected, No objection from GI standpoint to                 proceed with heart transplantation/advanced heart therapies.                       - Please call back should any further questions or concerns arise.    < end of copied text >     < from: Xray Chest 1 View- PORTABLE-Urgent (11.01.23 @ 07:42) >    IMPRESSION:  Clear lungs.    ---End of Report ---        < end of copied text >  < from: TTE W or WO Ultrasound Enhancing Agent (11.01.23 @ 10:23) >  _____________________________     CONCLUSIONS:      1. Left ventricular cavity is moderately dilated. Left ventricular wall thickness is normal. Left ventricular systolic function is severely decreased with an ejection fraction of 32 % by Chinchilla's method of disks. Regional wall motion abnormalities present.   2. Multiple segmental abnormalities exist. See findings.   3. There is moderate (grade 2) left ventricular diastolic dysfunction, with indeterminant filling pressure.   4. Normal right ventricular cavity size, wall thickness, and systolic function.   5. No significant valvular disease.   6. No pericardial effusion seen.   7. Compared to the transthoracic echocardiogram performed on 1/25/2017 the areas of akinesis are unchanged but there has been a decline in LV systolic function with new areas of hypokinesis.    __________________________________________________________________    < end of copied text >  < from: TTE Limited W or WO Ultrasound Enhancing Agent (11.02.23 @ 07:41) >  __________________________     CONCLUSIONS:      1. After obtaining consent, Definity ultrasound enhancing agent was given for enhanced left ventricular opacification and improved delineation of the left ventricular endocardial borders. Left ventricular systolic function is severely decreased with a calculated ejection fraction of 22 % by the Chinchilla's biplane method of disks. There is a left ventricular thrombus.   2. Findings were discussed with Litzy BOSS on 11/2/2023 at 8.49am.   3. There is a left ventricular thrombus.    _________________________________________________________________    < end of copied text >

## 2023-11-28 NOTE — PROGRESS NOTE ADULT - ASSESSMENT
59 yo M with HFrEF (LVIDd 6.4 cm, LVEF 32%), CAD s/p PCI (2008), HTN, DMT2 (A1c 8.3%) and CVA s/p TPA (2018), recently treated for PNA who initially presented to [facility] via EMS after syncope reportedly requiring defibrilation. Treated for ACS but left AMA to come to [facility]. On arrival ECG with ST depression in lateral leads. Intubated 11/1 for respiratory failure. Found to have COVID. L/RHC 11/1revealing  of LAD and RCA, elevated filling pressures and CI of 1.5 prompting placement of IABP. Extubated 11/3. IABP was weaned to off 11/7, then the following day on 11/8, developed PMVT cardiac arrest with prompt CPR and defibrillation, started on IV Lidocaine and Amio. IABP ultimately replaced on 11/9 for worsening hypotension. TTE 11/11 revealing LV thrombus.     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 AT evaluation launched 11/10, he is ABO A. Currently well supported on IABP at 1:1 without further arrhythmias.  He was discussed during TSC on 11/16 and was approved for listing, however was found to be Bacteremic with 11/17 Blc Cx growing mixed cultures Staph epi and Enterococcus faecalis and started on IV Vanco. Repeat cultures from 11/18 reveal NGTD and therefore was cleared by ID for listing. He's currently afebrile, now plateaued leukocytosis with completion of Vanco. He is hemodynamically stable with MAPS 90-100s on IABP 1:1 on high dose oral vaosdilators/GDMT. Labs today notable for downtrend in his Cr to 1.5 from 1.9 and drifting Bicarb. He was listed on 11/22 UNOS Status 2, he is ABO A, PRA's 0. Awaiting suitable donor.       Cardiac Studies  11/21/23 TTE: limited unable to rule out endocarditis, technically difficult study  11/1123 TTE: LVEF 22% (global), LV thrombus, normal RV size and function, mild MR, trace TR  11/1/23  LHC showed pLAD 70 % stenosis in the ostium portion of the segment and 100 % in-stent restenosis and in mRCA, 100 % stenosis.   11/1/23 RHC; RA 23 Vw 24, PA 52/33/40, PCWP 30 Vw 33, AO 85/66/73, PA sat 54.4%, CO/CI (F) 2.96/1.44, IABP was placed during the cath through R common femoral artery.   11/1/23 TTE: LVIDd 6.4cm , LVEF 32%, regional WMA, grade II DD,  TAPSE 1.7cm, mld MR, trace TR,     Bedside hemodynamics  11/25 IABP 1:1: CVP 3, PA  33-36/12 mean PA 20-23 PCWP 15-16, MVO2 72.0%, CO/CI 6.2/2.8,   dsc? (PVR 0.8-1.1 medina calculated by me)   11/3: IABP 1:1 RA 5; PA 27/12/18; CO/CI 5.6/2.6; MAP 90-100s.  11/4/23 IABP 1:3 CVP 4 PA 37/18 SvO2 83.8 CO/CI 11.2 CI 5.1  (in the setting of fever to 38.3C)  11/5 IABP 1:1 CVP 3 PA 48/27 SvO2 78.4% CO 8.2 CI 3.7   11/1 (IABP 1:1): CVP 1, PA 33/5/16, MvO2 74.3%

## 2023-11-28 NOTE — PROGRESS NOTE ADULT - SUBJECTIVE AND OBJECTIVE BOX
[facility] Division of Kidney Diseases & Hypertension  FOLLOW UP NOTE  [phone]--------------------------------------------------------------------------------  Chief Complaint:Non-ST elevation myocardial infarction (NSTEMI)        24 hour events/subjective:  Pt has a new onset rash all over the body especially on the Shoulders and thorax. Feels a bit fatigued. Had a good bowel movement and he still feels bloated. No other acute overnight events.   Pt denies SOB/Diarrhea/ Nausea/ Vomiting/ abdominal pain/ chest pain/ tingling/ numbness.         PAST HISTORY  --------------------------------------------------------------------------------  No significant changes to PMH, PSH, FHx, SHx, unless otherwise noted    ALLERGIES & MEDICATIONS  --------------------------------------------------------------------------------  Allergies    penicillins (Unknown)    Intolerances      Standing Inpatient Medications  aMIOdarone    Tablet   Oral   aMIOdarone    Tablet 200 milliGRAM(s) Oral daily  aspirin  chewable 81 milliGRAM(s) Oral daily  atorvastatin 80 milliGRAM(s) Oral at bedtime  budesonide  80 MICROgram(s)/formoterol 4.5 MICROgram(s) Inhaler 2 Puff(s) Inhalation two times a day  carvedilol 25 milliGRAM(s) Oral every 12 hours  chlorhexidine 2% Cloths 1 Application(s) Topical daily  dextrose 50% Injectable 25 Gram(s) IV Push once  dextrose 50% Injectable 12.5 Gram(s) IV Push once  heparin  Infusion 1600 Unit(s)/Hr IV Continuous <Continuous>  hydrALAZINE 100 milliGRAM(s) Oral three times a day  insulin glargine Injectable (LANTUS) 12 Unit(s) SubCutaneous at bedtime  insulin lispro (ADMELOG) corrective regimen sliding scale   SubCutaneous at bedtime  insulin lispro (ADMELOG) corrective regimen sliding scale   SubCutaneous three times a day before meals  insulin lispro Injectable (ADMELOG) 8 Unit(s) SubCutaneous three times a day before meals  isosorbide   dinitrate Tablet (ISORDIL) 40 milliGRAM(s) Oral three times a day  lactulose Syrup 30 Gram(s) Oral every 4 hours  polyethylene glycol 3350 17 Gram(s) Oral two times a day  senna 2 Tablet(s) Oral at bedtime  zoster Vaccine recombinant (SHINGRIX) 0.5 milliLiter(s) IntraMuscular once    PRN Inpatient Medications  hydrOXYzine hydrochloride 25 milliGRAM(s) Oral two times a day PRN      REVIEW OF SYSTEMS  --------------------------------------------------------------------------------  As above.    VITALS/PHYSICAL EXAM  --------------------------------------------------------------------------------  T(C): 37.1 (11-27-23 @ 23:00), Max: 37.1 (11-27-23 @ 23:00)  HR: 76 (11-28-23 @ 06:00) (69 - 81)  BP: --  RR: 19 (11-28-23 @ 06:00) (15 - 29)  SpO2: 95% (11-28-23 @ 06:00) (95% - 100%)  Wt(kg): --        11-27-23 @ 07:01  -  11-28-23 @ 07:00  --------------------------------------------------------  IN: 1369 mL / OUT: 1925 mL / NET: -556 mL      Physical Exam:  Gen: NAD, well-appearing  HEENT: PERRL, supple neck, clear oropharynx  Pulm: CTA B/L  CV: S1S2; IABP +  Back: No spinal or CVA tenderness  Abd: +BS, soft, nontender/nondistended  : No suprapubic tenderness   Extremities: no bilateral LE edema noted.   Neuro: No focal deficits  Skin: Warm, without rashes  Vascular access: NA      LABS/STUDIES  --------------------------------------------------------------------------------              12.5   10.58 >-----------<  207      [11-28-23 @ 02:25]              36.7     127  |  97  |  38  ----------------------------<  206      [11-28-23 @ 02:25]  4.0   |  16  |  1.52        Ca     10.1     [11-28-23 @ 02:25]      Mg     2.1     [11-28-23 @ 02:25]      Phos  4.2     [11-28-23 @ 02:25]    TPro  8.1  /  Alb  4.0  /  TBili  0.4  /  DBili  x   /  AST  31  /  ALT  49  /  AlkPhos  84  [11-28-23 @ 02:25]    PT/INR: PT 13.0 , INR 1.19       [11-28-23 @ 02:25]  PTT: 78.8       [11-28-23 @ 02:25]      Creatinine Trend:  SCr 1.52 [11-28 @ 02:25]  SCr 1.50 [11-27 @ 15:08]  SCr 1.91 [11-27 @ 03:09]  SCr 1.56 [11-26 @ 04:30]  SCr 1.60 [11-25 @ 04:56]    Urinalysis - [11-28-23 @ 02:25]      Color  / Appearance  / SG  / pH       Gluc 206 / Ketone   / Bili  / Urobili        Blood  / Protein  / Leuk Est  / Nitrite       RBC  / WBC  / Hyaline  / Gran  / Sq Epi  / Non Sq Epi  / Bacteria

## 2023-11-28 NOTE — PROGRESS NOTE ADULT - CRITICAL CARE ATTENDING COMMENT
58 male with HTN, CAD (s/p PCI 2008), HFrEF, CVA 2018, and T2DM a/w cardiogenic shock requiring IABP.         Neuro: A/O, no active issues  Resp: No O2 requirements  CVS: Well supported on IABP. Tolerating coreg, hdzn/isordil. Therapeutic on hep gtt, c/w amio for VT. Listed status 2. c/w asa and lipitor.  GI: Abd pain and distension likely due to constipation, continue with aggressive bowel regimen   Renal: Cr stable, stable hyponatremia, nephro f/u.   ID: Monitor off abx  Endo: Monitor sugars, titrate insulin  Heme: hep gtt, plt stable  R fem IABP 11/20.

## 2023-11-28 NOTE — PROGRESS NOTE ADULT - SUBJECTIVE AND OBJECTIVE BOX
ADVANCED HEART FAILURE & TRANSPLANT  - PROGRESS NOTE  *To reach the NS2 Team from 8am to 5pm (MON-FRI), please call [phone].   _______________________________________________________________________________________________________    Subjective:    Medications:  aMIOdarone    Tablet   Oral   aMIOdarone    Tablet 200 milliGRAM(s) Oral daily  aspirin  chewable 81 milliGRAM(s) Oral daily  atorvastatin 80 milliGRAM(s) Oral at bedtime  budesonide  80 MICROgram(s)/formoterol 4.5 MICROgram(s) Inhaler 2 Puff(s) Inhalation two times a day  carvedilol 25 milliGRAM(s) Oral every 12 hours  chlorhexidine 2% Cloths 1 Application(s) Topical daily  dextrose 50% Injectable 25 Gram(s) IV Push once  dextrose 50% Injectable 12.5 Gram(s) IV Push once  heparin  Infusion 1600 Unit(s)/Hr IV Continuous <Continuous>  hydrALAZINE 100 milliGRAM(s) Oral three times a day  hydrOXYzine hydrochloride 25 milliGRAM(s) Oral two times a day PRN  insulin glargine Injectable (LANTUS) 12 Unit(s) SubCutaneous at bedtime  insulin lispro (ADMELOG) corrective regimen sliding scale   SubCutaneous three times a day before meals  insulin lispro (ADMELOG) corrective regimen sliding scale   SubCutaneous at bedtime  insulin lispro Injectable (ADMELOG) 8 Unit(s) SubCutaneous three times a day before meals  isosorbide   dinitrate Tablet (ISORDIL) 40 milliGRAM(s) Oral three times a day  lactulose Syrup 30 Gram(s) Oral every 4 hours  polyethylene glycol 3350 17 Gram(s) Oral two times a day  senna 2 Tablet(s) Oral at bedtime  zoster Vaccine recombinant (SHINGRIX) 0.5 milliLiter(s) IntraMuscular once      Physical Exam:    Vitals:  Vital Signs Last 24 Hours  T(C): 37.1 (23 @ 23:00), Max: 37.1 (23 @ 23:00)  HR: 76 (23 @ 06:00) (67 - 81)  BP: --  RR: 19 (23 @ 06:00) (15 - 29)  SpO2: 95% (23 @ 06:00) (95% - 100%)    Weight in k.9 ( @ 06:00)    I&O's Summary    2023 07:01  -  2023 07:00  --------------------------------------------------------  IN: 1369 mL / OUT: 1925 mL / NET: -556 mL        Tele:    General: No distress. Comfortable.  HEENT: EOM intact.  Neck: Neck supple. JVP not elevated. No masses  Chest: Clear to auscultation bilaterally  CV: Normal S1 and S2. No murmurs, rub, or gallops. Radial pulses normal.  Abdomen: Soft, non-distended, non-tender  Skin: No rashes or skin breakdown  Extremities: No LE edema  Neurology: Alert and oriented times three. Sensation intact  Psych: Affect normal    Labs:                        12.5   10.58 )-----------( 207      ( 2023 02:25 )             36.7         127<L>  |  97  |  38<H>  ----------------------------<  206<H>  4.0   |  16<L>  |  1.52<H>    Ca    10.1      2023 02:25  Phos  4.2       Mg     2.1         TPro  8.1  /  Alb  4.0  /  TBili  0.4  /  DBili  x   /  AST  31  /  ALT  49<H>  /  AlkPhos  84      PT/INR - ( 2023 02:25 )   PT: 13.0 sec;   INR: 1.19 ratio         PTT - ( 2023 02:25 )  PTT:78.8 sec        Oxygen Saturation, Mixed: 72.0 ( @ 16:45)      Lactate, Blood: 1.4 mmol/L ( @ 03:09)  Lactate, Blood: 1.4 mmol/L ( @ 04:30)     ADVANCED HEART FAILURE & TRANSPLANT  - PROGRESS NOTE  *To reach the NS2 Team from 8am to 5pm (MON-FRI), please call [phone].   _______________________________________________________________________________________________________    Subjective:  - Had ABD pain overnight; s/p Bowel regimen wit successfully BM x2 today  - IABP at 1:1; Placement advanced overnight    Medications:  aMIOdarone    Tablet   Oral   aMIOdarone    Tablet 200 milliGRAM(s) Oral daily  aspirin  chewable 81 milliGRAM(s) Oral daily  atorvastatin 80 milliGRAM(s) Oral at bedtime  budesonide  80 MICROgram(s)/formoterol 4.5 MICROgram(s) Inhaler 2 Puff(s) Inhalation two times a day  carvedilol 25 milliGRAM(s) Oral every 12 hours  chlorhexidine 2% Cloths 1 Application(s) Topical daily  dextrose 50% Injectable 25 Gram(s) IV Push once  dextrose 50% Injectable 12.5 Gram(s) IV Push once  heparin  Infusion 1600 Unit(s)/Hr IV Continuous <Continuous>  hydrALAZINE 100 milliGRAM(s) Oral three times a day  hydrOXYzine hydrochloride 25 milliGRAM(s) Oral two times a day PRN  insulin glargine Injectable (LANTUS) 12 Unit(s) SubCutaneous at bedtime  insulin lispro (ADMELOG) corrective regimen sliding scale   SubCutaneous three times a day before meals  insulin lispro (ADMELOG) corrective regimen sliding scale   SubCutaneous at bedtime  insulin lispro Injectable (ADMELOG) 8 Unit(s) SubCutaneous three times a day before meals  isosorbide   dinitrate Tablet (ISORDIL) 40 milliGRAM(s) Oral three times a day  lactulose Syrup 30 Gram(s) Oral every 4 hours  polyethylene glycol 3350 17 Gram(s) Oral two times a day  senna 2 Tablet(s) Oral at bedtime  zoster Vaccine recombinant (SHINGRIX) 0.5 milliLiter(s) IntraMuscular once      Physical Exam:    Vitals:  Vital Signs Last 24 Hours  T(C): 37.1 (23 @ 23:00), Max: 37.1 (23 @ 23:00)  HR: 76 (23 @ 06:00) (67 - 81)  Aug mean:   RR: 19 (23 @ 06:00) (15 - 29)  SpO2: 95% (23 @ 06:00) (95% - 100%)    Weight in k.9 ( @ 06:00)    I&O's Summary    2023 07:01  -  2023 07:00  --------------------------------------------------------  IN: 1369 mL / OUT: 1925 mL / NET: -556 mL    Tele: SR 70-80's    General: No distress. Comfortable.  HEENT: EOM intact.  Neck: JVP not elevated  Chest: Clear to auscultation bilaterally  CV: Normal S1 and S2. No murmurs, rub, or gallops. Radial pulses normal, warm peripherally  Abdomen: Soft, non-distended, non-tender  Skin: No rashes or skin breakdown  Extremities: No LE edema  Neurology: Alert and oriented times three. Sensation intact  Psych: Affect normal      Labs:                        12.5   10.58 )-----------( 207      ( 2023 02:25 )             36.7         127<L>  |  97  |  38<H>  ----------------------------<  206<H>  4.0   |  16<L>  |  1.52<H>    Ca    10.1      2023 02:25  Phos  4.2       Mg     2.1         TPro  8.1  /  Alb  4.0  /  TBili  0.4  /  DBili  x   /  AST  31  /  ALT  49<H>  /  AlkPhos  84    PT/INR - ( 2023 02:25 )   PT: 13.0 sec;   INR: 1.19 ratio         PTT - ( 2023 02:25 )  PTT:78.8 sec  Oxygen Saturation, Mixed: 72.0 ( @ 16:45)    Lactate, Blood: 1.4 mmol/L ( @ 03:09)  Lactate, Blood: 1.4 mmol/L ( @ 04:30)

## 2023-11-28 NOTE — PROGRESS NOTE ADULT - SUBJECTIVE AND OBJECTIVE BOX
INFECTIOUS DISEASES FOLLOW UP-- Courtney Peters  [phone]    This is a follow up note for this  58yMale with  Non-ST elevation myocardial infarction (NSTEMI)  abdominal pain and cramping overnight- underwent Ct scan with ileus seen        ROS:  CONSTITUTIONAL:  No fever, good appetite  CARDIOVASCULAR:  No chest pain or palpitations  RESPIRATORY:  No dyspnea  GASTROINTESTINAL:  No nausea, vomiting, diarrhea, or abdominal pain  GENITOURINARY:  No dysuria  NEUROLOGIC:  No headache,     Allergies    penicillins (Unknown)    Intolerances        ANTIBIOTICS/RELEVANT:  antimicrobials    immunologic:  zoster Vaccine recombinant (SHINGRIX) 0.5 milliLiter(s) IntraMuscular once    OTHER:  aMIOdarone    Tablet   Oral   aMIOdarone    Tablet 200 milliGRAM(s) Oral daily  aspirin  chewable 81 milliGRAM(s) Oral daily  atorvastatin 80 milliGRAM(s) Oral at bedtime  budesonide  80 MICROgram(s)/formoterol 4.5 MICROgram(s) Inhaler 2 Puff(s) Inhalation two times a day  carvedilol 25 milliGRAM(s) Oral every 12 hours  chlorhexidine 2% Cloths 1 Application(s) Topical daily  dextrose 50% Injectable 25 Gram(s) IV Push once  dextrose 50% Injectable 12.5 Gram(s) IV Push once  heparin  Infusion 1300 Unit(s)/Hr IV Continuous <Continuous>  hydrALAZINE 100 milliGRAM(s) Oral three times a day  hydrOXYzine hydrochloride 25 milliGRAM(s) Oral two times a day PRN  insulin glargine Injectable (LANTUS) 12 Unit(s) SubCutaneous at bedtime  insulin lispro (ADMELOG) corrective regimen sliding scale   SubCutaneous at bedtime  insulin lispro (ADMELOG) corrective regimen sliding scale   SubCutaneous three times a day before meals  insulin lispro Injectable (ADMELOG) 8 Unit(s) SubCutaneous three times a day before meals  isosorbide   dinitrate Tablet (ISORDIL) 40 milliGRAM(s) Oral three times a day  lactulose Syrup 30 Gram(s) Oral every 6 hours  polyethylene glycol 3350 17 Gram(s) Oral two times a day  senna 2 Tablet(s) Oral at bedtime  simethicone 80 milliGRAM(s) Chew once  urea Oral Powder 15 Gram(s) Oral two times a day      Objective:  Vital Signs Last 24 Hrs  T(C): 37.3 (2023 15:00), Max: 37.3 (2023 15:00)  T(F): 99.1 (2023 15:00), Max: 99.1 (2023 15:00)  HR: 79 (2023 15:00) (73 - 81)  BP: --  BP(mean): --  RR: 18 (2023 15:00) (13 - 29)  SpO2: 96% (2023 15:00) (95% - 98%)    Parameters below as of 2023 08:00  Patient On (Oxygen Delivery Method): room air        PHYSICAL EXAM:  Constitutional:no acute distress  Eyes:MARVEL, EOMI  Ear/Nose/Throat: no oral lesions, 	  Respiratory: clear BL  Cardiovascular: S1S2  Gastrointestinal:soft, (+) BS, no tenderness  Extremities:no e/e/c  IABP site CDI  No Lymphadenopathy  IV sites not inflammed.    LABS:                        12.5   10.58 )-----------( 207      ( 2023 02:25 )             36.7     11-28    127<L>  |  97  |  38<H>  ----------------------------<  206<H>  4.0   |  16<L>  |  1.52<H>    Ca    10.1      2023 02:25  Phos  4.2     28  Mg     2.1     28    TPro  8.1  /  Alb  4.0  /  TBili  0.4  /  DBili  x   /  AST  31  /  ALT  49<H>  /  AlkPhos  84  11-28    PT/INR - ( 2023 02:25 )   PT: 13.0 sec;   INR: 1.19 ratio         PTT - ( 2023 02:25 )  PTT:78.8 sec  Urinalysis Basic - ( 2023 16:58 )    Color: Yellow / Appearance: Clear / S.029 / pH: x  Gluc: x / Ketone: Negative mg/dL  / Bili: Negative / Urobili: 0.2 mg/dL   Blood: x / Protein: Trace mg/dL / Nitrite: Negative   Leuk Esterase: Negative / RBC: 1 /HPF / WBC 1 /HPF   Sq Epi: x / Non Sq Epi: 1 /HPF / Bacteria: Negative /HPF        MICROBIOLOGY:      Urinalysis + Microscopic Examination (23 @ 16:58)    pH Urine: 5.0   Urine Appearance: Clear   Color: Yellow   Specific Gravity: 1.029   Protein, Urine: Trace mg/dL   Glucose Qualitative, Urine: Negative mg/dL   Ketone - Urine: Negative mg/dL   Blood, Urine: Negative   Bilirubin: Negative   Urobilinogen: 0.2 mg/dL   Leukocyte Esterase Concentration: Negative   Nitrite: Negative   Review: Reviewed   White Blood Cell - Urine: 1 /HPF   Red Blood Cell - Urine: 1 /HPF   Bacteria: Negative /HPF   Cast: 25 /LPF   Hyaline Casts: Present   Epithelial Cells: 1 /HPF          RECENT CULTURES:      RADIOLOGY & ADDITIONAL STUDIES:    < from: CT Abdomen and Pelvis No Cont (23 @ 03:38) >    IMPRESSION:  Mildly dilated colon and prominent but not overly dilated small bowel   with air-fluid levels. No discrete transition point. Findings are   suggestive of ileus.    < end of copied text >

## 2023-11-28 NOTE — PROGRESS NOTE ADULT - ASSESSMENT
59 yo male h/o htn, cad s/p pci, ICH, here with NSTEMI  s/p intubation and cath. now in CCU    NSTEMI  s/p  cath  cath results noted. multi vessel dz., CTSx f/u.   hep gtt  pt with vtach/fib arrest again on 11/8. s/p ACLS and ROSC.   now extubated  IABP in place  mngt as per CCU  plan for transplant vs LVAD as per HF and transplant team  transplant w/u ongoing.   s/p colonoscopy 11/17. normal  now listed for transplant as of 11/22    LV thrombus   hep gtt    acute on chronic systolic heart failure  heart failure team f/u    pna  recently diagnosed outpt  iv abx now stopped  f/u cult   id following     covid  supportive care     h/o ICH  resolved    agitation  psy consult f/u    bacteremia  id following  iv abx  f/u repeat cult - neg    vomiting and abd pail  ileus on CT  bowel regimen  gi f/u  IABP malposition on CT  mngt as per ccu. iabp adjusted    mngt as per CCU team          Advanced care planning was discussed with patient and family.  Advanced care planning forms were reviewed and discussed as appropriate.  Differential diagnosis and plan of care discussed with patient after the evaluation.   Pain assessed and judicious use of narcotics when appropriate was discussed.  Importance of Fall prevention discussed.  Counseling on Smoking and Alcohol cessation was offered when appropriate.  Counseling on Diet, exercise, and medication compliance was done.       Approx 75 minutes spent.

## 2023-11-28 NOTE — PROGRESS NOTE ADULT - SUBJECTIVE AND OBJECTIVE BOX
PATIENT:  DEVORAH VALENCIA  [id number]    CHIEF COMPLAINT:  Patient is a 58y old  Male who presents with a chief complaint of Cardiogenic shock (27 Nov 2023 20:46)      INTERVAL HISTORYOVERNIGHT EVENTS:      REVIEW OF SYSTEMS:    Constitutional:     [ ] negative [ ] fevers [ ] chills [ ] weight loss [ ] weight gain  HEENT:                  [ ] negative [ ] dry eyes [ ] eye irritation [ ] postnasal drip [ ] nasal congestion  CV:                         [ ] negative  [ ] chest pain [ ] orthopnea [ ] palpitations [ ] murmur  Resp:                     [ ] negative [ ] cough [ ] shortness of breath [ ] dyspnea [ ] wheezing [ ] sputum [ ] hemoptysis  GI:                          [ ] negative [ ] nausea [ ] vomiting [ ] diarrhea [ ] constipation [ ] abd pain [ ] dysphagia   :                        [ ] negative [ ] dysuria [ ] nocturia [ ] hematuria [ ] increased urinary frequency  Musculoskeletal: [ ] negative [ ] back pain [ ] myalgias [ ] arthralgias [ ] fracture  Skin:                       [ ] negative [ ] rash [ ] itch  Neurological:        [ ] negative [ ] headache [ ] dizziness [ ] syncope [ ] weakness [ ] numbness  Psychiatric:           [ ] negative [ ] anxiety [ ] depression  Endocrine:            [ ] negative [ ] diabetes [ ] thyroid problem  Heme/Lymph:      [ ] negative [ ] anemia [ ] bleeding problem  Allergic/Immune: [ ] negative [ ] itchy eyes [ ] nasal discharge [ ] hives [ ] angioedema    [ ] All other systems negative  [ ] Unable to assess ROS because ________.    MEDICATIONS:  MEDICATIONS  (STANDING):  aMIOdarone    Tablet   Oral   aMIOdarone    Tablet 200 milliGRAM(s) Oral daily  aspirin  chewable 81 milliGRAM(s) Oral daily  atorvastatin 80 milliGRAM(s) Oral at bedtime  budesonide  80 MICROgram(s)/formoterol 4.5 MICROgram(s) Inhaler 2 Puff(s) Inhalation two times a day  carvedilol 25 milliGRAM(s) Oral every 12 hours  chlorhexidine 2% Cloths 1 Application(s) Topical daily  dextrose 50% Injectable 12.5 Gram(s) IV Push once  dextrose 50% Injectable 25 Gram(s) IV Push once  heparin  Infusion 1600 Unit(s)/Hr (13 mL/Hr) IV Continuous <Continuous>  hydrALAZINE 100 milliGRAM(s) Oral three times a day  insulin glargine Injectable (LANTUS) 12 Unit(s) SubCutaneous at bedtime  insulin lispro (ADMELOG) corrective regimen sliding scale   SubCutaneous three times a day before meals  insulin lispro (ADMELOG) corrective regimen sliding scale   SubCutaneous at bedtime  insulin lispro Injectable (ADMELOG) 8 Unit(s) SubCutaneous three times a day before meals  isosorbide   dinitrate Tablet (ISORDIL) 40 milliGRAM(s) Oral three times a day  lactulose Syrup 30 Gram(s) Oral every 4 hours  polyethylene glycol 3350 17 Gram(s) Oral two times a day  senna 2 Tablet(s) Oral at bedtime  zoster Vaccine recombinant (SHINGRIX) 0.5 milliLiter(s) IntraMuscular once    MEDICATIONS  (PRN):  hydrOXYzine hydrochloride 25 milliGRAM(s) Oral two times a day PRN Anxiety      ALLERGIES:  Allergies    penicillins (Unknown)    Intolerances        OBJECTIVE:  ICU Vital Signs Last 24 Hrs  T(C): 37.1 (27 Nov 2023 23:00), Max: 37.1 (27 Nov 2023 23:00)  T(F): 98.7 (27 Nov 2023 23:00), Max: 98.7 (27 Nov 2023 23:00)  HR: 76 (28 Nov 2023 05:00) (67 - 81)  BP: --  BP(mean): --  ABP: --  ABP(mean): --  RR: 15 (28 Nov 2023 05:00) (15 - 29)  SpO2: 97% (28 Nov 2023 05:00) (96% - 100%)    O2 Parameters below as of 28 Nov 2023 05:00  Patient On (Oxygen Delivery Method): room air            Adult Advanced Hemodynamics Last 24 Hrs  CVP(mm Hg): --  CVP(cm H2O): --  CO: --  CI: --  PA: --  PA(mean): --  PCWP: --  SVR: --  SVRI: --  PVR: --  PVRI: --  CAPILLARY BLOOD GLUCOSE      POCT Blood Glucose.: 180 mg/dL (27 Nov 2023 22:48)  POCT Blood Glucose.: 164 mg/dL (27 Nov 2023 17:28)  POCT Blood Glucose.: 119 mg/dL (27 Nov 2023 12:02)  POCT Blood Glucose.: 195 mg/dL (27 Nov 2023 08:13)    CAPILLARY BLOOD GLUCOSE      POCT Blood Glucose.: 180 mg/dL (27 Nov 2023 22:48)    I&O's Summary    26 Nov 2023 07:01  -  27 Nov 2023 07:00  --------------------------------------------------------  IN: 1892 mL / OUT: 2025 mL / NET: -133 mL    27 Nov 2023 07:01  -  28 Nov 2023 06:35  --------------------------------------------------------  IN: 1236 mL / OUT: 1625 mL / NET: -389 mL      Daily     Daily     PHYSICAL EXAMINATION:  General: WN/WD NAD  HEENT: PERRLA, EOMI, moist mucous membranes  Neurology: A&Ox3, nonfocal, MOISE x 4  Respiratory: CTA B/L, normal respiratory effort, no wheezes, crackles, rales  CV: RRR, S1S2, no murmurs, rubs or gallops  Abdominal: Soft, NT, ND +BS, Last BM  Extremities: No edema, + peripheral pulses  Incisions:   Tubes:    LABS:                          12.5   10.58 )-----------( 207      ( 28 Nov 2023 02:25 )             36.7     11-28    127<L>  |  97  |  38<H>  ----------------------------<  206<H>  4.0   |  16<L>  |  1.52<H>    Ca    10.1      28 Nov 2023 02:25  Phos  4.2     11-28  Mg     2.1     11-28    TPro  8.1  /  Alb  4.0  /  TBili  0.4  /  DBili  x   /  AST  31  /  ALT  49<H>  /  AlkPhos  84  11-28    LIVER FUNCTIONS - ( 28 Nov 2023 02:25 )  Alb: 4.0 g/dL / Pro: 8.1 g/dL / ALK PHOS: 84 U/L / ALT: 49 U/L / AST: 31 U/L / GGT: x           PT/INR - ( 28 Nov 2023 02:25 )   PT: 13.0 sec;   INR: 1.19 ratio         PTT - ( 28 Nov 2023 02:25 )  PTT:78.8 sec        Urinalysis Basic - ( 28 Nov 2023 02:25 )    Color: x / Appearance: x / SG: x / pH: x  Gluc: 206 mg/dL / Ketone: x  / Bili: x / Urobili: x   Blood: x / Protein: x / Nitrite: x   Leuk Esterase: x / RBC: x / WBC x   Sq Epi: x / Non Sq Epi: x / Bacteria: x        TELEMETRY:     EKG:     IMAGING:       PATIENT:  DEVORAH VALENCIA  [id number]    CHIEF COMPLAINT:  Patient is a 58y old  Male who presents with a chief complaint of Cardiogenic shock (27 Nov 2023 20:46)      INTERVAL HISTORY/OVERNIGHT EVENTS: abdominal pain with nausea. taken for CT overnight.      REVIEW OF SYSTEMS:  Constitutional:     [X] negative [ ] fevers [ ] chills [ ] weight loss [ ] weight gain  HEENT:                  [X] negative [ ] dry eyes [ ] eye irritation [ ] postnasal drip [ ] nasal congestion  CV:                         [X] negative  [ ] chest pain [ ] orthopnea [ ] palpitations [ ] murmur  Resp:                     [X] negative [ ] cough [ ] shortness of breath [ ] dyspnea [ ] wheezing [ ] sputum [ ] hemoptysis  GI:                          [X] negative [ ] nausea [ ] vomiting [ ] diarrhea [ ] constipation [ ] abd pain [ ] dysphagia   :                        [X] negative [ ] dysuria [ ] nocturia [ ] hematuria [ ] increased urinary frequency  Musculoskeletal: [X] negative [ ] back pain [ ] myalgias [ ] arthralgias [ ] fracture  Skin:                       [X] negative [ ] rash [ ] itch  Neurological:        [X] negative [ ] headache [ ] dizziness [ ] syncope [ ] weakness [ ] numbness    [ ] All other systems negative  [ ] Unable to assess ROS because ________.    MEDICATIONS:  MEDICATIONS  (STANDING):  aMIOdarone    Tablet   Oral   aMIOdarone    Tablet 200 milliGRAM(s) Oral daily  aspirin  chewable 81 milliGRAM(s) Oral daily  atorvastatin 80 milliGRAM(s) Oral at bedtime  budesonide  80 MICROgram(s)/formoterol 4.5 MICROgram(s) Inhaler 2 Puff(s) Inhalation two times a day  carvedilol 25 milliGRAM(s) Oral every 12 hours  chlorhexidine 2% Cloths 1 Application(s) Topical daily  dextrose 50% Injectable 12.5 Gram(s) IV Push once  dextrose 50% Injectable 25 Gram(s) IV Push once  heparin  Infusion 1600 Unit(s)/Hr (13 mL/Hr) IV Continuous <Continuous>  hydrALAZINE 100 milliGRAM(s) Oral three times a day  insulin glargine Injectable (LANTUS) 12 Unit(s) SubCutaneous at bedtime  insulin lispro (ADMELOG) corrective regimen sliding scale   SubCutaneous three times a day before meals  insulin lispro (ADMELOG) corrective regimen sliding scale   SubCutaneous at bedtime  insulin lispro Injectable (ADMELOG) 8 Unit(s) SubCutaneous three times a day before meals  isosorbide   dinitrate Tablet (ISORDIL) 40 milliGRAM(s) Oral three times a day  lactulose Syrup 30 Gram(s) Oral every 4 hours  polyethylene glycol 3350 17 Gram(s) Oral two times a day  senna 2 Tablet(s) Oral at bedtime  zoster Vaccine recombinant (SHINGRIX) 0.5 milliLiter(s) IntraMuscular once    MEDICATIONS  (PRN):  hydrOXYzine hydrochloride 25 milliGRAM(s) Oral two times a day PRN Anxiety      ALLERGIES:  Allergies  penicillins (Unknown)  Intolerances    OBJECTIVE:  ICU Vital Signs Last 24 Hrs  T(C): 37.1 (27 Nov 2023 23:00), Max: 37.1 (27 Nov 2023 23:00)  T(F): 98.7 (27 Nov 2023 23:00), Max: 98.7 (27 Nov 2023 23:00)  HR: 76 (28 Nov 2023 05:00) (67 - 81)  BP: --  BP(mean): --  ABP: --  ABP(mean): --  RR: 15 (28 Nov 2023 05:00) (15 - 29)  SpO2: 97% (28 Nov 2023 05:00) (96% - 100%)    O2 Parameters below as of 28 Nov 2023 05:00  Patient On (Oxygen Delivery Method): room air      POCT Blood Glucose.: 180 mg/dL (27 Nov 2023 22:48)  POCT Blood Glucose.: 164 mg/dL (27 Nov 2023 17:28)  POCT Blood Glucose.: 119 mg/dL (27 Nov 2023 12:02)  POCT Blood Glucose.: 195 mg/dL (27 Nov 2023 08:13)    CAPILLARY BLOOD GLUCOSE      POCT Blood Glucose.: 180 mg/dL (27 Nov 2023 22:48)    I&O's Summary    26 Nov 2023 07:01  -  27 Nov 2023 07:00  --------------------------------------------------------  IN: 1892 mL / OUT: 2025 mL / NET: -133 mL    27 Nov 2023 07:01  -  28 Nov 2023 06:35  --------------------------------------------------------  IN: 1236 mL / OUT: 1625 mL / NET: -389 mL      Daily     Daily     PHYSICAL EXAMINATION:  General: alert, in no acute distress  HEENT: PERRLA, EOMI, moist mucous membranes  Neurology: A&Ox3, nonfocal, MOISE x 4  Respiratory: CTA B/L, normal respiratory effort, no wheezes, crackles, rales  CV: RRR, S1S2, no murmurs, rubs or gallops  Abdominal: Soft, NT, ND +BS, Last BM  Extremities: No edema, + peripheral pulses  Incisions:   Tubes:    LABS:                          12.5   10.58 )-----------( 207      ( 28 Nov 2023 02:25 )             36.7     11-28    127<L>  |  97  |  38<H>  ----------------------------<  206<H>  4.0   |  16<L>  |  1.52<H>    Ca    10.1      28 Nov 2023 02:25  Phos  4.2     11-28  Mg     2.1     11-28    TPro  8.1  /  Alb  4.0  /  TBili  0.4  /  DBili  x   /  AST  31  /  ALT  49<H>  /  AlkPhos  84  11-28    LIVER FUNCTIONS - ( 28 Nov 2023 02:25 )  Alb: 4.0 g/dL / Pro: 8.1 g/dL / ALK PHOS: 84 U/L / ALT: 49 U/L / AST: 31 U/L / GGT: x           PT/INR - ( 28 Nov 2023 02:25 )   PT: 13.0 sec;   INR: 1.19 ratio         PTT - ( 28 Nov 2023 02:25 )  PTT:78.8 sec        Urinalysis Basic - ( 28 Nov 2023 02:25 )    Color: x / Appearance: x / SG: x / pH: x  Gluc: 206 mg/dL / Ketone: x  / Bili: x / Urobili: x   Blood: x / Protein: x / Nitrite: x   Leuk Esterase: x / RBC: x / WBC x   Sq Epi: x / Non Sq Epi: x / Bacteria: x        TELEMETRY:     EKG:     IMAGING:       PATIENT:  DEVORAH VALENCIA  [id number]    CHIEF COMPLAINT:  Patient is a 58y old  Male who presents with a chief complaint of Cardiogenic shock (27 Nov 2023 20:46)      INTERVAL HISTORY/OVERNIGHT EVENTS: abdominal pain with nausea. taken for CT overnight.      REVIEW OF SYSTEMS:  Constitutional:     [X] negative [ ] fevers [ ] chills [ ] weight loss [ ] weight gain  HEENT:                  [X] negative [ ] dry eyes [ ] eye irritation [ ] postnasal drip [ ] nasal congestion  CV:                         [X] negative  [ ] chest pain [ ] orthopnea [ ] palpitations [ ] murmur  Resp:                     [X] negative [ ] cough [ ] shortness of breath [ ] dyspnea [ ] wheezing [ ] sputum [ ] hemoptysis  GI:                          [] negative [X ] nausea [ ] vomiting [ ] diarrhea [ ] constipation [ ] abd pain [ ] dysphagia   :                        [X] negative [ ] dysuria [ ] nocturia [ ] hematuria [ ] increased urinary frequency  Musculoskeletal: [X] negative [ ] back pain [ ] myalgias [ ] arthralgias [ ] fracture  Skin:                       [X] negative [ ] rash [ ] itch  Neurological:        [X] negative [ ] headache [ ] dizziness [ ] syncope [ ] weakness [ ] numbness    [ ] All other systems negative  [ ] Unable to assess ROS because ________.    MEDICATIONS:  MEDICATIONS  (STANDING):  aMIOdarone    Tablet   Oral   aMIOdarone    Tablet 200 milliGRAM(s) Oral daily  aspirin  chewable 81 milliGRAM(s) Oral daily  atorvastatin 80 milliGRAM(s) Oral at bedtime  budesonide  80 MICROgram(s)/formoterol 4.5 MICROgram(s) Inhaler 2 Puff(s) Inhalation two times a day  carvedilol 25 milliGRAM(s) Oral every 12 hours  chlorhexidine 2% Cloths 1 Application(s) Topical daily  dextrose 50% Injectable 12.5 Gram(s) IV Push once  dextrose 50% Injectable 25 Gram(s) IV Push once  heparin  Infusion 1600 Unit(s)/Hr (13 mL/Hr) IV Continuous <Continuous>  hydrALAZINE 100 milliGRAM(s) Oral three times a day  insulin glargine Injectable (LANTUS) 12 Unit(s) SubCutaneous at bedtime  insulin lispro (ADMELOG) corrective regimen sliding scale   SubCutaneous three times a day before meals  insulin lispro (ADMELOG) corrective regimen sliding scale   SubCutaneous at bedtime  insulin lispro Injectable (ADMELOG) 8 Unit(s) SubCutaneous three times a day before meals  isosorbide   dinitrate Tablet (ISORDIL) 40 milliGRAM(s) Oral three times a day  lactulose Syrup 30 Gram(s) Oral every 4 hours  polyethylene glycol 3350 17 Gram(s) Oral two times a day  senna 2 Tablet(s) Oral at bedtime  zoster Vaccine recombinant (SHINGRIX) 0.5 milliLiter(s) IntraMuscular once    MEDICATIONS  (PRN):  hydrOXYzine hydrochloride 25 milliGRAM(s) Oral two times a day PRN Anxiety      ALLERGIES:  Allergies  penicillins (Unknown)  Intolerances    OBJECTIVE:  ICU Vital Signs Last 24 Hrs  T(C): 37.1 (27 Nov 2023 23:00), Max: 37.1 (27 Nov 2023 23:00)  T(F): 98.7 (27 Nov 2023 23:00), Max: 98.7 (27 Nov 2023 23:00)  HR: 76 (28 Nov 2023 05:00) (67 - 81)  BP: --  BP(mean): --  ABP: --  ABP(mean): --  RR: 15 (28 Nov 2023 05:00) (15 - 29)  SpO2: 97% (28 Nov 2023 05:00) (96% - 100%)    O2 Parameters below as of 28 Nov 2023 05:00  Patient On (Oxygen Delivery Method): room air      POCT Blood Glucose.: 180 mg/dL (27 Nov 2023 22:48)  POCT Blood Glucose.: 164 mg/dL (27 Nov 2023 17:28)  POCT Blood Glucose.: 119 mg/dL (27 Nov 2023 12:02)  POCT Blood Glucose.: 195 mg/dL (27 Nov 2023 08:13)    CAPILLARY BLOOD GLUCOSE      POCT Blood Glucose.: 180 mg/dL (27 Nov 2023 22:48)    I&O's Summary    26 Nov 2023 07:01  -  27 Nov 2023 07:00  --------------------------------------------------------  IN: 1892 mL / OUT: 2025 mL / NET: -133 mL    27 Nov 2023 07:01  -  28 Nov 2023 06:35  --------------------------------------------------------  IN: 1236 mL / OUT: 1625 mL / NET: -389 mL      Daily     Daily     PHYSICAL EXAMINATION:  General: alert, in no acute distress  HEENT: PERRLA, EOMI, moist mucous membranes  Neurology: A&Ox3, nonfocal, MOISE x 4  Respiratory: CTA B/L, normal respiratory effort, no wheezes, crackles, rales  CV: RRR, S1S2, no murmurs, rubs or gallops  Abdominal: Soft, distended, + BS  Extremities: No edema, +2 palpable DP and radial pulses  Skin: warm and dry  Lines: R femoral IABP dressing, clean dry and intact    LABS:                          12.5   10.58 )-----------( 207      ( 28 Nov 2023 02:25 )             36.7     11-28    127<L>  |  97  |  38<H>  ----------------------------<  206<H>  4.0   |  16<L>  |  1.52<H>    Ca    10.1      28 Nov 2023 02:25  Phos  4.2     11-28  Mg     2.1     11-28    TPro  8.1  /  Alb  4.0  /  TBili  0.4  /  DBili  x   /  AST  31  /  ALT  49<H>  /  AlkPhos  84  11-28    LIVER FUNCTIONS - ( 28 Nov 2023 02:25 )  Alb: 4.0 g/dL / Pro: 8.1 g/dL / ALK PHOS: 84 U/L / ALT: 49 U/L / AST: 31 U/L / GGT: x           PT/INR - ( 28 Nov 2023 02:25 )   PT: 13.0 sec;   INR: 1.19 ratio         PTT - ( 28 Nov 2023 02:25 )  PTT:78.8 sec        Urinalysis Basic - ( 28 Nov 2023 02:25 )    Color: x / Appearance: x / SG: x / pH: x  Gluc: 206 mg/dL / Ketone: x  / Bili: x / Urobili: x   Blood: x / Protein: x / Nitrite: x   Leuk Esterase: x / RBC: x / WBC x   Sq Epi: x / Non Sq Epi: x / Bacteria: x        TELEMETRY: reviewed    EKG: reviewed    IMAGING: reviewed

## 2023-11-28 NOTE — PROGRESS NOTE ADULT - NS ATTEND AMEND GEN_ALL_CORE FT
Pt remains critically ill on IABP, afterload reducing agents and AAD.   Listed for heart tx, UNOS status 2.   Presented abd pain yesterday. HE had 2 BMS after a bowel regimen was given.   A CT of the abdomen demonstrated some findings suggestive of ileus and IABP malposition. IABP was advanced.   He is sleeping comfortably this am.   LAbs remain stable. Notably bicarb has been low. Recommend to discuss bicarb supplementation with nephrology.

## 2023-11-28 NOTE — PROGRESS NOTE ADULT - PROBLEM SELECTOR PLAN 1
ARIC: in the setting of heart failure, COVID infection. At the time of presentation Scr is 1.25. Started to worsen on 11/4 and peaked at 4.07 11/10 and gradually improved to 1.8 on 11/18. Pt had LHC on 11/1. He has hx of DM with proteinuria of 684 but most recent UA negative. Primary team is planning to get cardiac transplant eval. Remains nonoliguric, UOP 1.7L/24h. HIV, Hep B, Hep C negative. A1c - 8.3. Complements are not low. BETZAIDA and ANCA negative. Light chains - not significant. UPCR <0.1, PLA2R ab - negative, Anti GBM abs - negative. UPEP negative.   Duplex - normal, symmetric blood flow throughout both kidneys. Velocities and RIs are limited by IABP.   Serum creatinine is stable at 1.5. Pt's Hemodynamics are managed by cardio team and pt remained on IABP.    PLAN:  No indication for dialysis.  Avoid nephrotoxic agents. Dose meds per eGFR.

## 2023-11-28 NOTE — PROGRESS NOTE ADULT - ASSESSMENT
58 yrs old male w/ hx of HFrEF (LVIDd 6.4 cm, LVEF 32%), CAD s/p PCI (2008), HTN, DMT2 (A1c 8.3%) and CVA s/p TPA (2018), recently treated for PNA who initially presented to [facility] via EMS after syncope reportedly requiring defibrilation. Treated for ACS but left AMA to come to [facility]. Pt has covid and was placed on IABP for hypotension. Now being evaluated for Heart transplant Vs LVAD placement. Nephrology consulted for ARIC.

## 2023-11-28 NOTE — PROGRESS NOTE ADULT - PROBLEM SELECTOR PLAN 1
- L IABP at 1:1, placed 11/9. On AC with Heparin infusion (also for LV thrombus); s/p exchange to LFA given high grade bacteremia on 11/20  - Currently on Coreg 25 mg BID hold for MAP <65  - Continue HDZN 100 mg TID and ISDN 40 mg TID, hold for MAP <65  - Continue Spironolactone 25 mg QD  - PRN diuretics to target net even/- 500 fluid balance  - AT evaluation: launched 11/10. Accepted for transplant, currently listed UNOS Status 2. ABO A.   -cPRA 0% 11/13  - Last Brilinta dose was on 11/13  - Please keep primary Dr. Banuelos [phone] in the loop per family request.

## 2023-11-28 NOTE — PROGRESS NOTE ADULT - ASSESSMENT
====================ASSESSMENT ==============  58 male with HTN, CAD (s/p PCI 2008), HFrEF, CVA 2018, and T2DM presenting with chest pressure and unknown tachycardia that was shocked x1, [facility] 11/1 found to have in-stent restenosis of pLAD and  of RCA with elevated RA and PA pressures and severely decreased EF 32%. Admitted to CICU for management of cardiogenic shock and ADHF requiring IABP 11/1 -11/7, with hospital course c/b vfib arrest requiring reinsertion of IABP. Currently undergoing workup for AT evaluation with HF.    Plan:  ====================== NEUROLOGY=====================  # Anxiety  - No Seroquel/antipsychotics since vfib arrest and prolonged QTC  - Psych Eval, recommended SSRI, but pt. refused   - Psych recommending atarax PRN  - Continue to monitor mental status    # PT/Conditioning  - Continue band exercised while on bedrest s/t IABP, IS    ==================== RESPIRATORY======================  # Acute Hypoxemic Respiratory Failure  - s/p x2 intubations for cardiogenic pulm edema and the in setting of cardiac arrest, resolved - extubated 11/10  - Currently on room air with spO2 mid 90s  - Continue incentive spirometry and monitoring of sp02    # Asthma  - c/w albuterol, symbicort and spiriva  - On trelegy at home    ====================CARDIOVASCULAR====================  # Vfib arrest i/s/o ischemia  - Lido gtt off 11/13  - PO Amio load - total of 5g per EP complete 11/17, continue PO amio  - Keep K > 4, Mag > 2.2     # Cardiogenic shock requiring IABP (11/1- 11/7, 11/9-)  - Likely 2/2 NSTEMI and ADHF  - 11/1 LHC: pLAD 100 % in-stent restenosis & mRCA, 100 %. PCWP 30. IABP placed.  - 11/1 TTE: LV dilated. EF 32 %. Regional WMAs present, mod (grade 2) LV diastolic dysfunction  - 11/2 TTE: EF 22% and + LV thrombus  - PRN Bumex IVP as needed, appears euvolemic on exam  - IABP swapped 11/20 to RFA, continue 1:1 support  - Off Milrinone gtt @ 7:30 am 11/11  - Currently on hydralazine 100 TID and ISD 40 TID for AL reduction  - [place] d/c'd due to elevated K levels  - c/w coreg 25 BID for GDMT  - Listed OHT status 2 11/22, f/u HF recs    # NSTEMI iso stent re-occlusion of pLAD and 100%  of RCA  - EKG on admission w/LBBB  - DAPT: c/w ASA, Brilinta d/c'd per transplant w/u  - c/w lipitor 80  - cMR deferred given necessity of IABP  - CT sx not recommending CABG, undergoing AT eval    # LV thrombus  - c/w heparin gtt    ===================== RENAL =========================  # Non-oliguric ARIC   - sCr 1.91 today, Baseline Cr: 1-1.22  - Renal US - no evidence of renal artery stenosis  - Trend BMP, lytes daily, replace as needed  - Continue Strict I/Os, avoid nephrotoxins    # Mild Hyponatremia/Hyperkalemia  - iso ARIC and or new CKD baseline  - continue fluid restriction     ==================== GASTROINTESTINAL===================  #Constipation  - f/u CT abdomen  - now NPO r/o ileus   - continue lactulose q4h  - monitor for BM    ===================HEMATOLOGIC/ONC ===================  # VTE prophylaxis   - c/w heparin gtt given LV Thrombus and IABP  - H/H and platelets stable, continue to trend daily    =======================    ENDOCRINE  =====================  # Type 2 DM  - A1c 8.3, glucose   - Continue lantus, premeal, low ISS    ========================INFECTIOUS DISEASE================  # Enterococcus faecalis bacteremia  - BCx 11/17+ for enterococcus faecalis x2, Staph epi x1 (likely contaminant)- pan sensitive   - Urine cx 11/18 + enterococcus faecalis  - BCx 11/18 NGTD  - IABP site swapped to RFA 11/20  - continue vancomycin 1g q12h (11/18- stop 11/27 per ID)  - CT A/P negative for infectious pathology    # COVID, resolved  - Off airborne precautions 11/11    # Pre-transplant ID w/u   - Trend ID recs for serologies   - Colonoscopy 11/17 - normal   - Chest CT 11/17 - improved LLL aeration  - Pt. requiring vaccines prior to transplant, would initiate series accordingly  - Received hep A and hep B vaccines thus far    Dispo: remain in CICU   ====================ASSESSMENT ==============  58 male with HTN, CAD (s/p PCI 2008), HFrEF, CVA 2018, and T2DM presenting with chest pressure and unknown tachycardia that was shocked x1, [facility] 11/1 found to have in-stent restenosis of pLAD and  of RCA with elevated RA and PA pressures and severely decreased EF 32%. Admitted to CICU for management of cardiogenic shock and ADHF requiring IABP 11/1 -11/7, with hospital course c/b vfib arrest requiring reinsertion of IABP. Currently undergoing workup for AT evaluation with HF.    Plan:  ====================== NEUROLOGY=====================  # Anxiety  - No Seroquel/antipsychotics since vfib arrest and prolonged QTC  - Psych Eval, recommended SSRI, but pt. refused   - Psych recommending atarax PRN  - Continue to monitor mental status    # PT/Conditioning  - Continue band exercised while on bedrest s/t IABP, IS    ==================== RESPIRATORY======================  # Acute Hypoxemic Respiratory Failure  - s/p x2 intubations for cardiogenic pulm edema and the in setting of cardiac arrest, resolved - extubated 11/10  - Currently on room air with spO2 mid 90s  - Continue incentive spirometry and monitoring of sp02    # Asthma  - c/w albuterol, symbicort and spiriva  - On trelegy at home    ====================CARDIOVASCULAR====================  # Vfib arrest i/s/o ischemia  - Lido gtt off 11/13  - PO Amio load - total of 5g per EP complete 11/17, continue PO amio  - Keep K > 4, Mag > 2.2     # Cardiogenic shock requiring IABP (11/1- 11/7, 11/9-)  - Likely 2/2 NSTEMI and ADHF  - 11/1 LHC: pLAD 100 % in-stent restenosis & mRCA, 100 %. PCWP 30. IABP placed.  - 11/1 TTE: LV dilated. EF 32 %. Regional WMAs present, mod (grade 2) LV diastolic dysfunction  - 11/2 TTE: EF 22% and + LV thrombus  - PRN Bumex IVP as needed, appears euvolemic on exam  - IABP swapped 11/20 to RFA, continue 1:1 support  - Off Milrinone gtt @ 7:30 am 11/11  - Currently on hydralazine 100 TID and ISD 40 TID for AL reduction  - [place] d/c'd due to elevated K levels  - c/w coreg 25 BID for GDMT  - Listed OHT status 2 11/22, f/u HF recs    # NSTEMI iso stent re-occlusion of pLAD and 100%  of RCA  - EKG on admission w/LBBB  - DAPT: c/w ASA, Brilinta d/c'd per transplant w/u  - c/w lipitor 80  - cMR deferred given necessity of IABP  - CT sx not recommending CABG, undergoing AT eval    # LV thrombus  - c/w heparin gtt    ===================== RENAL =========================  # Non-oliguric ARIC   - sCr 1.91 today, Baseline Cr: 1-1.22  - Renal US - no evidence of renal artery stenosis  - Trend BMP, lytes daily, replace as needed  - Continue Strict I/Os, avoid nephrotoxins    # Mild Hyponatremia/Hyperkalemia  - iso ARIC and or new CKD baseline  - continue fluid restriction     ==================== GASTROINTESTINAL===================  #Constipation/ ? ileus  - f/u CT abdomen read  - now NPO  - continue lactulose q4h  - monitor for BM    ===================HEMATOLOGIC/ONC ===================  # VTE prophylaxis   - c/w heparin gtt given LV Thrombus and IABP  - H/H and platelets stable, continue to trend daily    =======================    ENDOCRINE  =====================  # Type 2 DM  - A1c 8.3, glucose   - Continue lantus, premeal, low ISS  - continue FS ACHS    ========================INFECTIOUS DISEASE================  # Enterococcus faecalis bacteremia  - BCx 11/17+ for enterococcus faecalis x2, Staph epi x1 (likely contaminant)- pan sensitive   - Urine cx 11/18 + enterococcus faecalis  - BCx 11/18 NGTD  - IABP site swapped to RFA 11/20  - continue vancomycin 1g q12h (11/18- stop 11/27 per ID)  - CT A/P negative for infectious pathology    # COVID, resolved  - Off airborne precautions 11/11    # Pre-transplant ID w/u   - Trend ID recs for serologies   - Colonoscopy 11/17 - normal   - Chest CT 11/17 - improved LLL aeration  - Pt. requiring vaccines prior to transplant, would initiate series accordingly  - Received hep A and hep B vaccines thus far    Lines: R femoral IABP (11/20-    Ethics/Dispo: full code. [facility]    BRITANY Becerra

## 2023-11-29 LAB
ALBUMIN SERPL ELPH-MCNC: 3.4 G/DL — SIGNIFICANT CHANGE UP (ref 3.3–5)
ALBUMIN SERPL ELPH-MCNC: 3.4 G/DL — SIGNIFICANT CHANGE UP (ref 3.3–5)
ALBUMIN SERPL ELPH-MCNC: 3.9 G/DL — SIGNIFICANT CHANGE UP (ref 3.3–5)
ALBUMIN SERPL ELPH-MCNC: 3.9 G/DL — SIGNIFICANT CHANGE UP (ref 3.3–5)
ALP SERPL-CCNC: 68 U/L — SIGNIFICANT CHANGE UP (ref 40–120)
ALP SERPL-CCNC: 68 U/L — SIGNIFICANT CHANGE UP (ref 40–120)
ALP SERPL-CCNC: 76 U/L — SIGNIFICANT CHANGE UP (ref 40–120)
ALP SERPL-CCNC: 76 U/L — SIGNIFICANT CHANGE UP (ref 40–120)
ALT FLD-CCNC: 29 U/L — SIGNIFICANT CHANGE UP (ref 10–45)
ALT FLD-CCNC: 29 U/L — SIGNIFICANT CHANGE UP (ref 10–45)
ALT FLD-CCNC: 33 U/L — SIGNIFICANT CHANGE UP (ref 10–45)
ALT FLD-CCNC: 33 U/L — SIGNIFICANT CHANGE UP (ref 10–45)
ANION GAP SERPL CALC-SCNC: 13 MMOL/L — SIGNIFICANT CHANGE UP (ref 5–17)
ANION GAP SERPL CALC-SCNC: 13 MMOL/L — SIGNIFICANT CHANGE UP (ref 5–17)
ANION GAP SERPL CALC-SCNC: 14 MMOL/L — SIGNIFICANT CHANGE UP (ref 5–17)
ANION GAP SERPL CALC-SCNC: 14 MMOL/L — SIGNIFICANT CHANGE UP (ref 5–17)
APPEARANCE UR: CLEAR — SIGNIFICANT CHANGE UP
APPEARANCE UR: CLEAR — SIGNIFICANT CHANGE UP
APTT BLD: 74.3 SEC — HIGH (ref 24.5–35.6)
APTT BLD: 74.3 SEC — HIGH (ref 24.5–35.6)
AST SERPL-CCNC: 16 U/L — SIGNIFICANT CHANGE UP (ref 10–40)
AST SERPL-CCNC: 16 U/L — SIGNIFICANT CHANGE UP (ref 10–40)
AST SERPL-CCNC: 19 U/L — SIGNIFICANT CHANGE UP (ref 10–40)
AST SERPL-CCNC: 19 U/L — SIGNIFICANT CHANGE UP (ref 10–40)
BACTERIA # UR AUTO: NEGATIVE /HPF — SIGNIFICANT CHANGE UP
BACTERIA # UR AUTO: NEGATIVE /HPF — SIGNIFICANT CHANGE UP
BASOPHILS # BLD AUTO: 0.03 K/UL — SIGNIFICANT CHANGE UP (ref 0–0.2)
BASOPHILS # BLD AUTO: 0.03 K/UL — SIGNIFICANT CHANGE UP (ref 0–0.2)
BASOPHILS NFR BLD AUTO: 0.2 % — SIGNIFICANT CHANGE UP (ref 0–2)
BASOPHILS NFR BLD AUTO: 0.2 % — SIGNIFICANT CHANGE UP (ref 0–2)
BILIRUB SERPL-MCNC: 0.5 MG/DL — SIGNIFICANT CHANGE UP (ref 0.2–1.2)
BILIRUB UR-MCNC: NEGATIVE — SIGNIFICANT CHANGE UP
BILIRUB UR-MCNC: NEGATIVE — SIGNIFICANT CHANGE UP
BUN SERPL-MCNC: 49 MG/DL — HIGH (ref 7–23)
BUN SERPL-MCNC: 49 MG/DL — HIGH (ref 7–23)
BUN SERPL-MCNC: 56 MG/DL — HIGH (ref 7–23)
BUN SERPL-MCNC: 56 MG/DL — HIGH (ref 7–23)
CALCIUM SERPL-MCNC: 9.4 MG/DL — SIGNIFICANT CHANGE UP (ref 8.4–10.5)
CALCIUM SERPL-MCNC: 9.4 MG/DL — SIGNIFICANT CHANGE UP (ref 8.4–10.5)
CALCIUM SERPL-MCNC: 9.8 MG/DL — SIGNIFICANT CHANGE UP (ref 8.4–10.5)
CALCIUM SERPL-MCNC: 9.8 MG/DL — SIGNIFICANT CHANGE UP (ref 8.4–10.5)
CAST: 7 /LPF — HIGH (ref 0–4)
CAST: 7 /LPF — HIGH (ref 0–4)
CHLORIDE SERPL-SCNC: 96 MMOL/L — SIGNIFICANT CHANGE UP (ref 96–108)
CO2 SERPL-SCNC: 17 MMOL/L — LOW (ref 22–31)
CO2 SERPL-SCNC: 17 MMOL/L — LOW (ref 22–31)
CO2 SERPL-SCNC: 18 MMOL/L — LOW (ref 22–31)
CO2 SERPL-SCNC: 18 MMOL/L — LOW (ref 22–31)
COLOR SPEC: YELLOW — SIGNIFICANT CHANGE UP
COLOR SPEC: YELLOW — SIGNIFICANT CHANGE UP
CREAT ?TM UR-MCNC: 100 MG/DL — SIGNIFICANT CHANGE UP
CREAT ?TM UR-MCNC: 100 MG/DL — SIGNIFICANT CHANGE UP
CREAT SERPL-MCNC: 1.64 MG/DL — HIGH (ref 0.5–1.3)
CREAT SERPL-MCNC: 1.64 MG/DL — HIGH (ref 0.5–1.3)
CREAT SERPL-MCNC: 1.96 MG/DL — HIGH (ref 0.5–1.3)
CREAT SERPL-MCNC: 1.96 MG/DL — HIGH (ref 0.5–1.3)
DIFF PNL FLD: NEGATIVE — SIGNIFICANT CHANGE UP
DIFF PNL FLD: NEGATIVE — SIGNIFICANT CHANGE UP
EGFR: 39 ML/MIN/1.73M2 — LOW
EGFR: 39 ML/MIN/1.73M2 — LOW
EGFR: 48 ML/MIN/1.73M2 — LOW
EGFR: 48 ML/MIN/1.73M2 — LOW
EOSINOPHIL # BLD AUTO: 0.15 K/UL — SIGNIFICANT CHANGE UP (ref 0–0.5)
EOSINOPHIL # BLD AUTO: 0.15 K/UL — SIGNIFICANT CHANGE UP (ref 0–0.5)
EOSINOPHIL NFR BLD AUTO: 1.2 % — SIGNIFICANT CHANGE UP (ref 0–6)
EOSINOPHIL NFR BLD AUTO: 1.2 % — SIGNIFICANT CHANGE UP (ref 0–6)
FINE GRAN CASTS #/AREA URNS AUTO: PRESENT
FINE GRAN CASTS #/AREA URNS AUTO: PRESENT
GLUCOSE BLDC GLUCOMTR-MCNC: 113 MG/DL — HIGH (ref 70–99)
GLUCOSE BLDC GLUCOMTR-MCNC: 113 MG/DL — HIGH (ref 70–99)
GLUCOSE BLDC GLUCOMTR-MCNC: 162 MG/DL — HIGH (ref 70–99)
GLUCOSE BLDC GLUCOMTR-MCNC: 162 MG/DL — HIGH (ref 70–99)
GLUCOSE BLDC GLUCOMTR-MCNC: 184 MG/DL — HIGH (ref 70–99)
GLUCOSE BLDC GLUCOMTR-MCNC: 184 MG/DL — HIGH (ref 70–99)
GLUCOSE BLDC GLUCOMTR-MCNC: 88 MG/DL — SIGNIFICANT CHANGE UP (ref 70–99)
GLUCOSE BLDC GLUCOMTR-MCNC: 88 MG/DL — SIGNIFICANT CHANGE UP (ref 70–99)
GLUCOSE SERPL-MCNC: 145 MG/DL — HIGH (ref 70–99)
GLUCOSE SERPL-MCNC: 145 MG/DL — HIGH (ref 70–99)
GLUCOSE SERPL-MCNC: 162 MG/DL — HIGH (ref 70–99)
GLUCOSE SERPL-MCNC: 162 MG/DL — HIGH (ref 70–99)
GLUCOSE UR QL: 100 MG/DL
GLUCOSE UR QL: 100 MG/DL
HCT VFR BLD CALC: 36.3 % — LOW (ref 39–50)
HCT VFR BLD CALC: 36.3 % — LOW (ref 39–50)
HGB BLD-MCNC: 12.1 G/DL — LOW (ref 13–17)
HGB BLD-MCNC: 12.1 G/DL — LOW (ref 13–17)
HYALINE CASTS # UR AUTO: PRESENT
HYALINE CASTS # UR AUTO: PRESENT
IMM GRANULOCYTES NFR BLD AUTO: 0.5 % — SIGNIFICANT CHANGE UP (ref 0–0.9)
IMM GRANULOCYTES NFR BLD AUTO: 0.5 % — SIGNIFICANT CHANGE UP (ref 0–0.9)
KETONES UR-MCNC: NEGATIVE MG/DL — SIGNIFICANT CHANGE UP
KETONES UR-MCNC: NEGATIVE MG/DL — SIGNIFICANT CHANGE UP
LACTATE SERPL-SCNC: 1.9 MMOL/L — SIGNIFICANT CHANGE UP (ref 0.5–2)
LACTATE SERPL-SCNC: 1.9 MMOL/L — SIGNIFICANT CHANGE UP (ref 0.5–2)
LEUKOCYTE ESTERASE UR-ACNC: NEGATIVE — SIGNIFICANT CHANGE UP
LEUKOCYTE ESTERASE UR-ACNC: NEGATIVE — SIGNIFICANT CHANGE UP
LYMPHOCYTES # BLD AUTO: 1.1 K/UL — SIGNIFICANT CHANGE UP (ref 1–3.3)
LYMPHOCYTES # BLD AUTO: 1.1 K/UL — SIGNIFICANT CHANGE UP (ref 1–3.3)
LYMPHOCYTES # BLD AUTO: 8.6 % — LOW (ref 13–44)
LYMPHOCYTES # BLD AUTO: 8.6 % — LOW (ref 13–44)
MAGNESIUM SERPL-MCNC: 2.2 MG/DL — SIGNIFICANT CHANGE UP (ref 1.6–2.6)
MAGNESIUM SERPL-MCNC: 2.2 MG/DL — SIGNIFICANT CHANGE UP (ref 1.6–2.6)
MAGNESIUM SERPL-MCNC: 2.4 MG/DL — SIGNIFICANT CHANGE UP (ref 1.6–2.6)
MAGNESIUM SERPL-MCNC: 2.4 MG/DL — SIGNIFICANT CHANGE UP (ref 1.6–2.6)
MCHC RBC-ENTMCNC: 29.5 PG — SIGNIFICANT CHANGE UP (ref 27–34)
MCHC RBC-ENTMCNC: 29.5 PG — SIGNIFICANT CHANGE UP (ref 27–34)
MCHC RBC-ENTMCNC: 33.3 GM/DL — SIGNIFICANT CHANGE UP (ref 32–36)
MCHC RBC-ENTMCNC: 33.3 GM/DL — SIGNIFICANT CHANGE UP (ref 32–36)
MCV RBC AUTO: 88.5 FL — SIGNIFICANT CHANGE UP (ref 80–100)
MCV RBC AUTO: 88.5 FL — SIGNIFICANT CHANGE UP (ref 80–100)
MONOCYTES # BLD AUTO: 1.07 K/UL — HIGH (ref 0–0.9)
MONOCYTES # BLD AUTO: 1.07 K/UL — HIGH (ref 0–0.9)
MONOCYTES NFR BLD AUTO: 8.3 % — SIGNIFICANT CHANGE UP (ref 2–14)
MONOCYTES NFR BLD AUTO: 8.3 % — SIGNIFICANT CHANGE UP (ref 2–14)
NEUTROPHILS # BLD AUTO: 10.43 K/UL — HIGH (ref 1.8–7.4)
NEUTROPHILS # BLD AUTO: 10.43 K/UL — HIGH (ref 1.8–7.4)
NEUTROPHILS NFR BLD AUTO: 81.2 % — HIGH (ref 43–77)
NEUTROPHILS NFR BLD AUTO: 81.2 % — HIGH (ref 43–77)
NITRITE UR-MCNC: NEGATIVE — SIGNIFICANT CHANGE UP
NITRITE UR-MCNC: NEGATIVE — SIGNIFICANT CHANGE UP
NRBC # BLD: 0 /100 WBCS — SIGNIFICANT CHANGE UP (ref 0–0)
NRBC # BLD: 0 /100 WBCS — SIGNIFICANT CHANGE UP (ref 0–0)
OSMOLALITY UR: 715 MOS/KG — SIGNIFICANT CHANGE UP (ref 300–900)
OSMOLALITY UR: 715 MOS/KG — SIGNIFICANT CHANGE UP (ref 300–900)
PH UR: 5.5 — SIGNIFICANT CHANGE UP (ref 5–8)
PH UR: 5.5 — SIGNIFICANT CHANGE UP (ref 5–8)
PHOSPHATE SERPL-MCNC: 3.3 MG/DL — SIGNIFICANT CHANGE UP (ref 2.5–4.5)
PHOSPHATE SERPL-MCNC: 3.3 MG/DL — SIGNIFICANT CHANGE UP (ref 2.5–4.5)
PHOSPHATE SERPL-MCNC: 4.6 MG/DL — HIGH (ref 2.5–4.5)
PHOSPHATE SERPL-MCNC: 4.6 MG/DL — HIGH (ref 2.5–4.5)
PLATELET # BLD AUTO: 184 K/UL — SIGNIFICANT CHANGE UP (ref 150–400)
PLATELET # BLD AUTO: 184 K/UL — SIGNIFICANT CHANGE UP (ref 150–400)
POTASSIUM SERPL-MCNC: 3.8 MMOL/L — SIGNIFICANT CHANGE UP (ref 3.5–5.3)
POTASSIUM SERPL-MCNC: 3.8 MMOL/L — SIGNIFICANT CHANGE UP (ref 3.5–5.3)
POTASSIUM SERPL-MCNC: 4.5 MMOL/L — SIGNIFICANT CHANGE UP (ref 3.5–5.3)
POTASSIUM SERPL-MCNC: 4.5 MMOL/L — SIGNIFICANT CHANGE UP (ref 3.5–5.3)
POTASSIUM SERPL-SCNC: 3.8 MMOL/L — SIGNIFICANT CHANGE UP (ref 3.5–5.3)
POTASSIUM SERPL-SCNC: 3.8 MMOL/L — SIGNIFICANT CHANGE UP (ref 3.5–5.3)
POTASSIUM SERPL-SCNC: 4.5 MMOL/L — SIGNIFICANT CHANGE UP (ref 3.5–5.3)
POTASSIUM SERPL-SCNC: 4.5 MMOL/L — SIGNIFICANT CHANGE UP (ref 3.5–5.3)
POTASSIUM UR-SCNC: 28 MMOL/L — SIGNIFICANT CHANGE UP
POTASSIUM UR-SCNC: 28 MMOL/L — SIGNIFICANT CHANGE UP
PROT SERPL-MCNC: 6.9 G/DL — SIGNIFICANT CHANGE UP (ref 6–8.3)
PROT SERPL-MCNC: 6.9 G/DL — SIGNIFICANT CHANGE UP (ref 6–8.3)
PROT SERPL-MCNC: 7.5 G/DL — SIGNIFICANT CHANGE UP (ref 6–8.3)
PROT SERPL-MCNC: 7.5 G/DL — SIGNIFICANT CHANGE UP (ref 6–8.3)
PROT UR-MCNC: NEGATIVE MG/DL — SIGNIFICANT CHANGE UP
PROT UR-MCNC: NEGATIVE MG/DL — SIGNIFICANT CHANGE UP
RBC # BLD: 4.1 M/UL — LOW (ref 4.2–5.8)
RBC # BLD: 4.1 M/UL — LOW (ref 4.2–5.8)
RBC # FLD: 14.9 % — HIGH (ref 10.3–14.5)
RBC # FLD: 14.9 % — HIGH (ref 10.3–14.5)
RBC CASTS # UR COMP ASSIST: 1 /HPF — SIGNIFICANT CHANGE UP (ref 0–4)
RBC CASTS # UR COMP ASSIST: 1 /HPF — SIGNIFICANT CHANGE UP (ref 0–4)
REVIEW: SIGNIFICANT CHANGE UP
REVIEW: SIGNIFICANT CHANGE UP
SODIUM SERPL-SCNC: 127 MMOL/L — LOW (ref 135–145)
SODIUM UR-SCNC: 21 MMOL/L — SIGNIFICANT CHANGE UP
SODIUM UR-SCNC: 21 MMOL/L — SIGNIFICANT CHANGE UP
SP GR SPEC: 1.02 — SIGNIFICANT CHANGE UP (ref 1–1.03)
SP GR SPEC: 1.02 — SIGNIFICANT CHANGE UP (ref 1–1.03)
SQUAMOUS # UR AUTO: 1 /HPF — SIGNIFICANT CHANGE UP (ref 0–5)
SQUAMOUS # UR AUTO: 1 /HPF — SIGNIFICANT CHANGE UP (ref 0–5)
UROBILINOGEN FLD QL: 0.2 MG/DL — SIGNIFICANT CHANGE UP (ref 0.2–1)
UROBILINOGEN FLD QL: 0.2 MG/DL — SIGNIFICANT CHANGE UP (ref 0.2–1)
WBC # BLD: 12.85 K/UL — HIGH (ref 3.8–10.5)
WBC # BLD: 12.85 K/UL — HIGH (ref 3.8–10.5)
WBC # FLD AUTO: 12.85 K/UL — HIGH (ref 3.8–10.5)
WBC # FLD AUTO: 12.85 K/UL — HIGH (ref 3.8–10.5)
WBC UR QL: 0 /HPF — SIGNIFICANT CHANGE UP (ref 0–5)
WBC UR QL: 0 /HPF — SIGNIFICANT CHANGE UP (ref 0–5)

## 2023-11-29 PROCEDURE — 71045 X-RAY EXAM CHEST 1 VIEW: CPT | Mod: 26

## 2023-11-29 PROCEDURE — 99291 CRITICAL CARE FIRST HOUR: CPT | Mod: 25

## 2023-11-29 PROCEDURE — 99232 SBSQ HOSP IP/OBS MODERATE 35: CPT

## 2023-11-29 PROCEDURE — 99292 CRITICAL CARE ADDL 30 MIN: CPT

## 2023-11-29 PROCEDURE — 99232 SBSQ HOSP IP/OBS MODERATE 35: CPT | Mod: GC

## 2023-11-29 PROCEDURE — 99291 CRITICAL CARE FIRST HOUR: CPT

## 2023-11-29 RX ORDER — INSULIN LISPRO 100/ML
9 VIAL (ML) SUBCUTANEOUS
Refills: 0 | Status: DISCONTINUED | OUTPATIENT
Start: 2023-11-29 | End: 2023-12-24

## 2023-11-29 RX ORDER — SODIUM CHLORIDE 9 MG/ML
500 INJECTION, SOLUTION INTRAVENOUS ONCE
Refills: 0 | Status: COMPLETED | OUTPATIENT
Start: 2023-11-29 | End: 2023-11-29

## 2023-11-29 RX ORDER — INSULIN LISPRO 100/ML
10 VIAL (ML) SUBCUTANEOUS
Refills: 0 | Status: DISCONTINUED | OUTPATIENT
Start: 2023-11-29 | End: 2023-11-29

## 2023-11-29 RX ORDER — POTASSIUM CHLORIDE 20 MEQ
20 PACKET (EA) ORAL ONCE
Refills: 0 | Status: COMPLETED | OUTPATIENT
Start: 2023-11-29 | End: 2023-11-29

## 2023-11-29 RX ADMIN — AMIODARONE HYDROCHLORIDE 200 MILLIGRAM(S): 400 TABLET ORAL at 05:57

## 2023-11-29 RX ADMIN — Medication 15 GRAM(S): at 06:14

## 2023-11-29 RX ADMIN — Medication 10 UNIT(S): at 11:38

## 2023-11-29 RX ADMIN — Medication 100 MILLIGRAM(S): at 05:57

## 2023-11-29 RX ADMIN — Medication 1: at 08:27

## 2023-11-29 RX ADMIN — CARVEDILOL PHOSPHATE 25 MILLIGRAM(S): 80 CAPSULE, EXTENDED RELEASE ORAL at 17:49

## 2023-11-29 RX ADMIN — Medication 1: at 11:37

## 2023-11-29 RX ADMIN — BUDESONIDE AND FORMOTEROL FUMARATE DIHYDRATE 2 PUFF(S): 160; 4.5 AEROSOL RESPIRATORY (INHALATION) at 08:24

## 2023-11-29 RX ADMIN — CARVEDILOL PHOSPHATE 25 MILLIGRAM(S): 80 CAPSULE, EXTENDED RELEASE ORAL at 05:57

## 2023-11-29 RX ADMIN — ISOSORBIDE DINITRATE 40 MILLIGRAM(S): 5 TABLET ORAL at 16:18

## 2023-11-29 RX ADMIN — Medication 15 GRAM(S): at 17:52

## 2023-11-29 RX ADMIN — ISOSORBIDE DINITRATE 40 MILLIGRAM(S): 5 TABLET ORAL at 11:37

## 2023-11-29 RX ADMIN — Medication 100 MILLIGRAM(S): at 14:03

## 2023-11-29 RX ADMIN — HEPARIN SODIUM 13 UNIT(S)/HR: 5000 INJECTION INTRAVENOUS; SUBCUTANEOUS at 08:28

## 2023-11-29 RX ADMIN — Medication 81 MILLIGRAM(S): at 11:37

## 2023-11-29 RX ADMIN — HEPARIN SODIUM 13 UNIT(S)/HR: 5000 INJECTION INTRAVENOUS; SUBCUTANEOUS at 05:56

## 2023-11-29 RX ADMIN — ATORVASTATIN CALCIUM 80 MILLIGRAM(S): 80 TABLET, FILM COATED ORAL at 21:52

## 2023-11-29 RX ADMIN — INSULIN GLARGINE 12 UNIT(S): 100 INJECTION, SOLUTION SUBCUTANEOUS at 22:53

## 2023-11-29 RX ADMIN — SODIUM CHLORIDE 500 MILLILITER(S): 9 INJECTION, SOLUTION INTRAVENOUS at 08:27

## 2023-11-29 RX ADMIN — ISOSORBIDE DINITRATE 40 MILLIGRAM(S): 5 TABLET ORAL at 06:15

## 2023-11-29 RX ADMIN — Medication 20 MILLIEQUIVALENT(S): at 06:15

## 2023-11-29 RX ADMIN — BUDESONIDE AND FORMOTEROL FUMARATE DIHYDRATE 2 PUFF(S): 160; 4.5 AEROSOL RESPIRATORY (INHALATION) at 22:40

## 2023-11-29 NOTE — PROGRESS NOTE ADULT - SUBJECTIVE AND OBJECTIVE BOX
Patient is a 58y old  Male who presents with a chief complaint of advanced cardiac therapies work up (2023 18:48)    INTERVAL HISTORY:  - s/p 500 LR for uptrending Cr  - x1 set of BCx sent i/s/o leukocytosis  - repeat set of BCx to be sent tonight    SUBJECTIVE  - Patient seen and evaluated at bedside.     MEDICATIONS:  MEDICATIONS  (STANDING):  aMIOdarone    Tablet 200 milliGRAM(s) Oral daily  aMIOdarone    Tablet   Oral   aspirin  chewable 81 milliGRAM(s) Oral daily  atorvastatin 80 milliGRAM(s) Oral at bedtime  budesonide  80 MICROgram(s)/formoterol 4.5 MICROgram(s) Inhaler 2 Puff(s) Inhalation two times a day  carvedilol 25 milliGRAM(s) Oral every 12 hours  chlorhexidine 2% Cloths 1 Application(s) Topical daily  heparin  Infusion 1300 Unit(s)/Hr (13 mL/Hr) IV Continuous <Continuous>  hydrALAZINE 100 milliGRAM(s) Oral three times a day  insulin glargine Injectable (LANTUS) 12 Unit(s) SubCutaneous at bedtime  insulin lispro (ADMELOG) corrective regimen sliding scale   SubCutaneous three times a day before meals  insulin lispro (ADMELOG) corrective regimen sliding scale   SubCutaneous at bedtime  insulin lispro Injectable (ADMELOG) 9 Unit(s) SubCutaneous three times a day before meals  isosorbide   dinitrate Tablet (ISORDIL) 40 milliGRAM(s) Oral three times a day  polyethylene glycol 3350 17 Gram(s) Oral two times a day  senna 2 Tablet(s) Oral at bedtime  urea Oral Powder 15 Gram(s) Oral two times a day  zoster Vaccine recombinant (SHINGRIX) 0.5 milliLiter(s) IntraMuscular once    MEDICATIONS  (PRN):  hydrOXYzine hydrochloride 25 milliGRAM(s) Oral two times a day PRN Anxiety      OBJECTIVE:  ICU Vital Signs Last 24 Hrs  T(C): 36.6 (2023 15:00), Max: 36.8 (2023 23:00)  T(F): 97.9 (2023 15:00), Max: 98.3 (2023 23:00)  HR: 73 (2023 18:00) (67 - 80)  BP: 89/51 (2023 11:00) (89/51 - 89/51)  BP(mean): 64 (2023 11:00) (64 - 64)  RR: 21 (2023 18:00) (13 - 46)  SpO2: 96% (2023 18:00) (94% - 98%)    O2 Parameters below as of 2023 15:10  Patient On (Oxygen Delivery Method): room air    CAPILLARY BLOOD GLUCOSE  POCT Blood Glucose.: 88 mg/dL (2023 16:16)  POCT Blood Glucose.: 162 mg/dL (2023 11:35)  POCT Blood Glucose.: 184 mg/dL (2023 07:55)  POCT Blood Glucose.: 213 mg/dL (2023 21:27)    CAPILLARY BLOOD GLUCOSE  POCT Blood Glucose.: 88 mg/dL (2023 16:16)    I&O's Summary    2023 07:01  -  2023 07:00  --------------------------------------------------------  IN: 652 mL / OUT: 825 mL / NET: -173 mL    2023 07:01  -  2023 19:30  --------------------------------------------------------  IN: 1363 mL / OUT: 600 mL / NET: 763 mL    Daily Weight in k.3 (2023 06:00)    PHYSICAL EXAM:  General: NAD, well-groomed, well-developed  Chest: Clear to auscultation bilaterally; no rales, rhonchi, or wheezing  Heart: Regular rate and rhythm; normal S1 and S2; no murmurs, rubs, or gallops  Abd: Soft, nontender, nondistended  Nervous System: AAOX3  Ext: no peripheral LE edema bilaterally    LABS:                        12.1   12.85 )-----------( 184      ( 2023 05:37 )             36.3         127<L>  |  96  |  56<H>  ----------------------------<  145<H>  4.5   |  18<L>  |  1.64<H>    Ca    9.4      2023 12:42  Phos  3.3       Mg     2.2         TPro  6.9  /  Alb  3.4  /  TBili  0.5  /  DBili  x   /  AST  16  /  ALT    /  AlkPhos  68      LIVER FUNCTIONS - ( 2023 12:42 )  Alb: 3.4 g/dL / Pro: 6.9 g/dL / ALK PHOS: 68 U/L / ALT: 29 U/L / AST: 16 U/L / GGT: x           PT/INR - ( 2023 02:25 )   PT: 13.0 sec;   INR: 1.19 ratio      PTT - ( 2023 05:37 )  PTT:74.3 sec    Urinalysis Basic - ( 2023 15:43 )    Color: Yellow / Appearance: Clear / S.022 / pH: x  Gluc: x / Ketone: Negative mg/dL  / Bili: Negative / Urobili: 0.2 mg/dL   Blood: x / Protein: Negative mg/dL / Nitrite: Negative   Leuk Esterase: Negative / RBC: 1 /HPF / WBC 0 /HPF   Sq Epi: x / Non Sq Epi: 1 /HPF / Bacteria: Negative /HPF      Assessment: 58 male with HTN, CAD (s/p PCI ), HFrEF, CVA , and T2DM presenting with chest pressure and unknown tachycardia that was shocked x1, [facility]  found to have in-stent restenosis of pLAD and  of RCA with elevated RA and PA pressures and severely decreased EF 32%. Admitted to CICU for management of cardiogenic shock and ADHF requiring IABP  -, with hospital course c/b vfib arrest requiring reinsertion of IABP. Currently undergoing workup for AT evaluation with HF.    Plan:  ====================== NEUROLOGY=====================  Anxiety  - No Seroquel/antipsychotics since vfib arrest and prolonged QTC  - Psych Eval, recommended SSRI, but pt. refused   - Psych recommending atarax PRN  - Continue to monitor mental status    PT/Conditioning  - Continue band exercised while on bedrest s/t IABP, IS    ==================== RESPIRATORY======================  Acute Hypoxemic Respiratory Failure  - s/p x2 intubations for cardiogenic pulm edema and the in setting of cardiac arrest, resolved - extubated 11/10  - Currently on room air with spO2 mid 90s  - Continue incentive spirometry and monitoring of sp02    Asthma  - c/w albuterol, symbicort and spiriva  - On trelegy at home  - continue to monitor SpO2 with goal >94%    ====================CARDIOVASCULAR==================  Vfib arrest i/s/o ischemia  - Lido gtt off   - PO Amio load - total of 5g per EP complete , continue PO amio  - Keep K > 4, Mag > 2.2     Cardiogenic shock requiring IABP (- , -)  - Likely 2/2 NSTEMI and ADHF  -  LHC: pLAD 100 % in-stent restenosis & mRCA, 100 %. PCWP 30. IABP placed.  -  TTE: LV dilated. EF 32 %. Regional WMAs present, mod (grade 2) LV diastolic dysfunction  -  TTE: EF 22% and + LV thrombus  - PRN Bumex IVP as needed, appears dry - 500 cc bolus ordered  - IABP swapped  to RFA, continue 1:1 support  - Off Milrinone gtt @ 7:30 am   - Currently on hydralazine 100 TID and ISD 40 TID for AL reduction  - [place] d/c'd due to elevated K levels  - c/w coreg 25 BID for GDMT  - Listed OHT status 2 , f/u HF recs    NSTEMI iso stent re-occlusion of pLAD and 100%  of RCA  - EKG on admission w/LBBB  - DAPT: c/w ASA, Brilinta d/c'd per transplant w/u  - c/w lipitor 80  - cMR deferred given necessity of IABP  - CT sx not recommending CABG, undergoing AT eval    LV thrombus  - c/w heparin gtt    ===================== RENAL =========================  Non-oliguric ARIC   - sCr 1.91 overnight, Baseline Cr: 1-1.22  - s/p 500 LR  - Renal US: no evidence of renal artery stenosis  - Trend BMP, lytes daily, replace as needed  - Continue Strict I/Os, avoid nephrotoxins  - f/u afternoon CMP    Mild Hyponatremia/Hyperkalemia  - iso ARIC and or new CKD baseline  - continue fluid restriction   - continue urea powder as per renal  - trend daily  - Continue monitoring urine output, lytes, SCr/ BUN  - replete lytes prn with goal K >4 and Mg >2    =============== GASTROINTESTINAL===================  Constipation/ ileus, resolved  - s/p multiple BMs, symptoms improving   - advance diet as tolerated  - continue lactulose q4h  - monitor for BM    ===================ENDO====================  Type 2 DM  - A1c 8.3  - Continue lantus, premeal, low ISS  - continue FS    ===================HEMATOLOGIC/ONC ===================  H/H & plts stable  - Monitor H/H and plts  - DVT PPX: heparin gtt    ==================INFECTIOUS DISEASE================  Leukocytosis  - repeat BCx pending  - monitor off abx at this time  - monitor and trend WBC and temperature curve     Dispo: Maintain in ICU while IABP in place    Patient requires continuous monitoring with bedside rhythm monitoring, arterial line, pulse oximetry, ventilator monitoring and intermittent blood gas analysis.  Care plan discussed with ICU care team.  I have spent 35 minutes providing critical care, in addition to initial critical time provided by CICU attending Dr. Delacruz, re-evaluated multiple times during the day.    Laura Mcclendon PA-C

## 2023-11-29 NOTE — PROGRESS NOTE ADULT - ASSESSMENT
58 yrs old male w/ hx of HFrEF (LVIDd 6.4 cm, LVEF 32%), CAD s/p PCI (2008), HTN, DMT2 (A1c 8.3%) and CVA s/p TPA (2018), recently treated for PNA who initially presented to [facility] via EMS after syncope reportedly requiring defibrilation. Treated for ACS but left AMA to come to [facility]. Pt has covid and was placed on IABP for hypotension. Now being evaluated for Heart transplant Vs LVAD placement. Nephrology consulted for ARIC.

## 2023-11-29 NOTE — PROGRESS NOTE ADULT - CRITICAL CARE ATTENDING COMMENT
58 male with HTN, CAD (s/p PCI 2008), HFrEF, CVA 2018, and T2DM a/w cardiogenic shock requiring IABP.         Neuro: A/O, no active issues  Resp: No O2 requirements  CVS: Well supported on IABP. Tolerating coreg, hdzn/isordil. Therapeutic on hep gtt, c/w amio for VT. Listed status 2. c/w asa and lipitor.  GI: Abd pain and distension likely due to constipation, improved now after aggressive bowel regimen  Renal: Cr mildly elevated likely 2/2 dehydration, repeat bmp later today after IVF  ID: Monitor off abx, mild leukocytosis no clear source of infection. Send blood cx.   Endo: Monitor sugars, titrate insulin  Heme: hep gtt, plt stable  R fem IABP 11/20.

## 2023-11-29 NOTE — PROGRESS NOTE ADULT - PROBLEM SELECTOR PLAN 1
ARIC: in the setting of heart failure, COVID infection. At the time of presentation Scr is 1.25. Started to worsen on 11/4 and peaked at 4.07 11/10 and gradually improved to 1.8 on 11/18. Pt had LHC on 11/1. He has hx of DM with proteinuria of 684 but most recent UA negative. Primary team is planning to get cardiac transplant eval. Remains nonoliguric, UOP 1.7L/24h. HIV, Hep B, Hep C negative. A1c - 8.3. Complements are not low. BETZAIDA and ANCA negative. Light chains - not significant. UPCR <0.1, PLA2R ab - negative, Anti GBM abs - negative. UPEP negative.   Duplex - normal, symmetric blood flow throughout both kidneys. Velocities and RIs are limited by IABP.   Serum creatinine has worsened slightly over the last 24 hours to 1.9 Pt's Hemodynamics are managed by cardio team and pt remained on IABP.    PLAN:  No indication for dialysis.  Send Urine studies and repeat the BMP and follow up with the results.   Avoid nephrotoxic agents. Dose meds per eGFR.

## 2023-11-29 NOTE — PROGRESS NOTE ADULT - PROBLEM SELECTOR PLAN 2
Hyponatremia: developed during this hospital course. He is not symptomatic. Urine Osm 471 and Urine Na 88.   Given 150cc bolus of 3% saline on 11/24. Na today 127 and pt remained asymptomatic. His nausea has improved and he was on liquid diet. Today he was transitioned to regular diet.     PLAN:  nausea could be the drivers for inappropriate ADH response. Na will improve with improvement of the symptoms.   Recommend fluid restriction to <1L/day. Discussed with pt.  Avoiding salt tabs iso heart failure.  C/w Urena 15g BID.   If the hyponatremia worsens, can try hypertonic saline. ( please sent urine Na and urine osmolarity along with serum osmolality)  rest of the management as per the primary team.

## 2023-11-29 NOTE — PROGRESS NOTE ADULT - NS ATTEND AMEND GEN_ALL_CORE FT
Pt feels much improved. He responded well to bowel regimen but now has loose stools.   Needed IVF this am due to hypovolemia/ARIC.   Appears hypo vs euvolemic.  Labs remarkable for elevated WBC. No other stigma of infection.  Recommend to check BxCx given h/o bacteremia.   Continue tMCS/IABP for cardiogenic shock.   Listed UNOS status 2.

## 2023-11-29 NOTE — PROGRESS NOTE ADULT - PROBLEM SELECTOR PLAN 1
- L IABP at 1:1, placed 11/9. On AC with Heparin infusion (also for LV thrombus); s/p exchange to LFA given high grade bacteremia on 11/20  - Currently on Coreg 25 mg BID hold for MAP <65  - Continue HDZN 100 mg TID and ISDN 40 mg TID, hold for MAP <65  - Continue Spironolactone 25 mg QD  - PRN diuretics to target net even/- 500 fluid balance; Agree with gentle fluid repletion today.   - AT evaluation: launched 11/10. Accepted for transplant, currently listed UNOS Status 2. ABO A.   -cPRA 0% 11/13  - Last Brilinta dose was on 11/13  - Please keep primary Dr. Banuelos [phone] in the loop per family request.

## 2023-11-29 NOTE — PROGRESS NOTE ADULT - ASSESSMENT
57 yo male h/o htn, cad s/p pci, ICH, here with NSTEMI  s/p intubation and cath. now in CCU    NSTEMI  s/p  cath  cath results noted. multi vessel dz., CTSx f/u.   hep gtt  pt with vtach/fib arrest again on 11/8. s/p ACLS and ROSC.   now extubated  IABP in place  mngt as per CCU  plan for transplant vs LVAD as per HF and transplant team  transplant w/u ongoing.   s/p colonoscopy 11/17. normal  now listed for transplant as of 11/22    LV thrombus   hep gtt    acute on chronic systolic heart failure  heart failure team f/u    pna  recently diagnosed outpt  iv abx now stopped  f/u cult   id following     covid  supportive care     h/o ICH  resolved    agitation  psy consult f/u    bacteremia  id following  iv abx  f/u repeat cult - neg    vomiting and abd pail  ileus on CT  bowel regimen  gi f/u  +bm overnight  now resolved.     IABP malposition on CT  mngt as per ccu. iabp adjusted    mngt as per CCU team          Advanced care planning was discussed with patient and family.  Advanced care planning forms were reviewed and discussed as appropriate.  Differential diagnosis and plan of care discussed with patient after the evaluation.   Pain assessed and judicious use of narcotics when appropriate was discussed.  Importance of Fall prevention discussed.  Counseling on Smoking and Alcohol cessation was offered when appropriate.  Counseling on Diet, exercise, and medication compliance was done.       Approx 75 minutes spent.

## 2023-11-29 NOTE — CHART NOTE - NSCHARTNOTEFT_GEN_A_CORE
Nutrition Follow Up Note  Patient seen for: follow up  Chart reviewed, events noted    Per chart, patient is a 59 y/o male with PMH including HTN, CAD (s/p PCI ), HFrEF (EF severely reduced ) not on GDMT, h/o CVA  (requiring TPA), DM. Patient presents to [facility] for SOB x1 week. Intubated on  for respiratory failure. Extubated to nasal cannula 11/3. Found to be COVID-19 positive w/ persistent fevers. S/p IABP for hemodynamic support in setting of possible sepsis. S/p VFib arrest requiring multiple shocks, ROSC ~10 minutes on , transfers back to CICU.    Source: [x] Patient       [x] EMR        [x] RN        [x] Family at bedside; pt's daughter & spouse       [] Other:  - If unable to interview patient: [] Trach/Vent/BiPAP  [] Disoriented/confused/inappropriate to interview    Diet, Clear Liquid (23 @ 17:10) [Active]  -> Noted pt was having nausea and abdominal pain. Was NPO and diet advanced to clears. Pt still complaining of nausea.    Nutrition-related concerns:  - Listed status 2 for OHT  - Remains on IABP for mechanical support  - 12 units lantus, ISS admleog for glycemic control at this time; HbA1c: 8.3% (suboptimal glycemic control for age)  - ARIC; Nephrology following; Slightly elevated phos noted    GI: Last BM: 11/28x1 per pt reports. Pt was previously constipated and had x2 BM's yesterday. Previous concern for ileus per CTA.  Bowel Regimen? [x] Yes - senna, miralax, lactulose    Weights:   Daily Weight in k.3 (-), Weight in k.9 (-), Weight in k.8 (-), Weight in k.8 (-), Weight in k.1 (-)  Wt history: Pt reports UBW ~180-190 pounds; reports recent weights likely elevated secondary to fluid retention. On/off diuresis. Will monitor trends as able. Noted weight trending downward towards UBW of ~81-86 kG.    Drug Dosing Weight  Height (cm): 175.3 (2023 15:48)  Weight (kg): 98.9 (2023 15:48)  BMI (kg/m2): 32.2 (2023 15:48)-> recalculated based on lowest on daily wt of 89.7 kG (11-21) (29.2 kG/m2).  BSA (m2): 2.14 (2023 15:48)  Reported UBW: ~81-86 kG    MEDICATIONS  (STANDING):  aMIOdarone    Tablet   Oral   aMIOdarone    Tablet 200 milliGRAM(s) Oral daily  aspirin  chewable 81 milliGRAM(s) Oral daily  atorvastatin 80 milliGRAM(s) Oral at bedtime  budesonide  80 MICROgram(s)/formoterol 4.5 MICROgram(s) Inhaler 2 Puff(s) Inhalation two times a day  carvedilol 25 milliGRAM(s) Oral every 12 hours  chlorhexidine 2% Cloths 1 Application(s) Topical daily  heparin  Infusion 1300 Unit(s)/Hr (13 mL/Hr) IV Continuous <Continuous>  hydrALAZINE 100 milliGRAM(s) Oral three times a day  insulin glargine Injectable (LANTUS) 12 Unit(s) SubCutaneous at bedtime  insulin lispro (ADMELOG) corrective regimen sliding scale   SubCutaneous three times a day before meals  insulin lispro (ADMELOG) corrective regimen sliding scale   SubCutaneous at bedtime  insulin lispro Injectable (ADMELOG) 10 Unit(s) SubCutaneous three times a day before meals  isosorbide   dinitrate Tablet (ISORDIL) 40 milliGRAM(s) Oral three times a day  lactated ringers Bolus 500 milliLiter(s) IV Bolus once  lactulose Syrup 30 Gram(s) Oral every 6 hours  polyethylene glycol 3350 17 Gram(s) Oral two times a day  senna 2 Tablet(s) Oral at bedtime  urea Oral Powder 15 Gram(s) Oral two times a day  zoster Vaccine recombinant (SHINGRIX) 0.5 milliLiter(s) IntraMuscular once    Pertinent Labs:  @ 05:37: Na 127<L>, BUN 49<H>, Cr 1.96<H>, <H>, K+ 3.8, Phos 4.6<H>, Mg 2.4, Alk Phos 76, ALT/SGPT 33, AST/SGOT 19, HbA1c --    A1C with Estimated Average Glucose Result: 8.3 % (23 @ 06:14)    Finger Sticks:  POCT Blood Glucose.: 184 mg/dL ( @ 07:55)  POCT Blood Glucose.: 213 mg/dL ( @ 21:27)  POCT Blood Glucose.: 183 mg/dL ( @ 18:02)  POCT Blood Glucose.: 173 mg/dL ( @ 12:25)  POCT Blood Glucose.: 192 mg/dL ( @ 08:46)    Triglycerides, Serum: 95 mg/dL (11-10-23 @ 17:25)    Skin per nursing documentation: no pressure injuries per documentation  Edema: none per documentation    Estimated Needs:   [x] recalculated: based on wt of 89 kG:  Estimated Caloric Needs: (23-28 kcal/kg): 9316-5350 kcal  Estimated Protein Needs: (1.3-1.5 g/kg): 115-133 gm  Defer fluid needs to team.    Previous Nutrition Diagnosis: increased nutrient needs  Nutrition Diagnosis is: [x] ongoing - being addressed with adequate oral intake    Previous Nutrition Diagnosis: Inadequate energy intake  Nutrition Diagnosis is: [x] ongoing; pt now on clears but previously intake appetite    Previous Nutrition Diagnosis: Food & Nutrition-Related Knowledge Deficit   Nutrition Diagnosis is: [x] ongoing - being addressed with ongoing diet education PRN    New Nutrition Diagnosis: [x] N/A    Nutrition Care Plan:  [x] In Progress  [] Achieved  [] Not applicable    Nutrition Interventions:     Education Provided:       [x] Yes: Previous diet education provided In the setting of LVAD and heart transplant evaluation, RD reviewed food safety after solid organ transplant guidelines; including, storage/cooking temperatures, cross contamination, expiration dates, and avoiding buffets and avoid eating out. Discussed S&S of food borne illness.  Reviewed food and immunosuppressive drug interactions (prednisone, tacrolimus, and cellcept). Reviewed the importance of BG control while on prednisone. Reviewed importance of a low K+ diet and reviewed foods high in K+ to be mindful of. Reviewed importance of avoiding grapefruit, pomegranate, starfruit and sour oranges. Pt was receptive to information and was able to use teach back points to verbalize understanding. Pt accepted written materials on discussed topics. Pt did not want to discuss LVAD education at this time.    Recommendations:  1. Continue clear liquids until/if medically feasible to reinitiate Consistent Carbohydrate + Low Sodium diet as tolerated  2. RD remains available to provide ongoing nutrition education PRN   3. Trend BG levels, renal indices, LFT's, electrolytes and triglycerides   4. Honor pt food preferences to promote adequate oral intake while in-house     Monitoring and Evaluation:   Continue to monitor nutritional intake, tolerance to diet prescription, weights, labs, skin integrity    RD remains available upon request and will follow up per protocol  Sultana Velazquez, MS, RD, CDN, CNSC avail. on MS Teams

## 2023-11-29 NOTE — PROGRESS NOTE ADULT - SUBJECTIVE AND OBJECTIVE BOX
LD VALENCIA  58y Male  MRN:[id number]    Patient is a 58y old  Male who presents with a chief complaint of NSTEMI (01 Nov 2023 20:29)    HPI:  57yo M w/ hx HTN, CAD w/ 1 stent in 2009, ICH (2008) presenting with abn ekg. Patient presented to [facility] where he was found to have STEMI, recommended to get cath however patient did not want to get it there so it left and came here.  Patient initially had cough, congestion, fever, was placed on antibiotics on Sunday.  Started feeling nauseous and had a presyncopal event after which he presented to ED last night.  Had chest pain as well.  Chest pain is midsternal.  Not currently having chest pain.  Received 4 aspirin 30 min pta. (01 Nov 2023 15:11)      Patient seen and evaluated at bedside in CCU. interval events noted    Interval HPI: remain in ccu. IABP in place   +BM    PAST MEDICAL & SURGICAL HISTORY:  HTN (hypertension)      CAD (coronary artery disease)  2009; stent      Intracranial hemorrhage  2008      Respiratory arrest  december 1st      Myocardial infarction, unspecified MI type, unspecified artery      History of coronary artery stent placement          REVIEW OF SYSTEMS:  as per hpi     VITALS:   ICU Vital Signs Last 24 Hrs  T(C): 36.6 (29 Nov 2023 15:00), Max: 36.8 (28 Nov 2023 23:00)  T(F): 97.9 (29 Nov 2023 15:00), Max: 98.3 (28 Nov 2023 23:00)  HR: 72 (29 Nov 2023 16:00) (67 - 82)  BP: 89/51 (29 Nov 2023 11:00) (89/51 - 89/51)  BP(mean): 64 (29 Nov 2023 11:00) (64 - 64)  ABP: --  ABP(mean): --  RR: 35 (29 Nov 2023 16:00) (13 - 46)  SpO2: 97% (29 Nov 2023 16:00) (95% - 98%)    O2 Parameters below as of 29 Nov 2023 15:10  Patient On (Oxygen Delivery Method): room air            PHYSICAL EXAM:  GENERAL: NAD, comfortable   HEAD:  Atraumatic, Normocephalic  EYES: EOMI, PERRLA, conjunctiva and sclera clear  NECK: Supple, No JVD   CHEST/LUNG: Clear to auscultation bilaterally; No wheeze  HEART: Regular rate and rhythm; No murmurs, rubs, or gallops  ABDOMEN: Soft, Nontender, Nondistended; Bowel sounds present  EXTREMITIES:  2+ Peripheral Pulses, No clubbing, cyanosis, or edema  NEUROLOGY: nonfocal  SKIN: No rashes or lesions  +iabp    Consultant(s) Notes Reviewed:  [x ] YES  [ ] NO  Care Discussed with Consultants/Other Providers [ x] YES  [ ] NO    MEDS:   MEDICATIONS  (STANDING):  aMIOdarone    Tablet   Oral   aMIOdarone    Tablet 200 milliGRAM(s) Oral daily  aspirin  chewable 81 milliGRAM(s) Oral daily  atorvastatin 80 milliGRAM(s) Oral at bedtime  budesonide  80 MICROgram(s)/formoterol 4.5 MICROgram(s) Inhaler 2 Puff(s) Inhalation two times a day  carvedilol 25 milliGRAM(s) Oral every 12 hours  chlorhexidine 2% Cloths 1 Application(s) Topical daily  heparin  Infusion 1300 Unit(s)/Hr (13 mL/Hr) IV Continuous <Continuous>  hydrALAZINE 100 milliGRAM(s) Oral three times a day  insulin glargine Injectable (LANTUS) 12 Unit(s) SubCutaneous at bedtime  insulin lispro (ADMELOG) corrective regimen sliding scale   SubCutaneous at bedtime  insulin lispro (ADMELOG) corrective regimen sliding scale   SubCutaneous three times a day before meals  insulin lispro Injectable (ADMELOG) 10 Unit(s) SubCutaneous three times a day before meals  isosorbide   dinitrate Tablet (ISORDIL) 40 milliGRAM(s) Oral three times a day  polyethylene glycol 3350 17 Gram(s) Oral two times a day  senna 2 Tablet(s) Oral at bedtime  urea Oral Powder 15 Gram(s) Oral two times a day  zoster Vaccine recombinant (SHINGRIX) 0.5 milliLiter(s) IntraMuscular once    MEDICATIONS  (PRN):  hydrOXYzine hydrochloride 25 milliGRAM(s) Oral two times a day PRN Anxiety        ALLERGIES:  penicillins (Unknown)      LABS:                                                       12.1   12.85 )-----------( 184      ( 29 Nov 2023 05:37 )             36.3   11-29    127<L>  |  96  |  56<H>  ----------------------------<  145<H>  4.5   |  18<L>  |  1.64<H>    Ca    9.4      29 Nov 2023 12:42  Phos  3.3     11-29  Mg     2.2     11-29    TPro  6.9  /  Alb  3.4  /  TBili  0.5  /  DBili  x   /  AST  16  /  ALT  29  /  AlkPhos  68  11-29      < from: CT Abdomen and Pelvis No Cont (11.28.23 @ 03:38) >  IMPRESSION:  Mildly dilated colon and prominent but not overly dilated small bowel   with air-fluid levels. No discrete transition point. Findings are   suggestive of ileus.    Intra-aortic balloon pump with the inferior marker at the level of the   inferior mesenteric artery and the balloon overlying the origins of the   renal arteries, celiac artery, and superior mesenteric artery. Consider   repositioning. Concern for IABP positioning was discussed with LANETTE Hawthorne   on 11/28/2023 at 3:42 AM by Dr. Shepard.    < end of copied text >          < from: Colonoscopy (11.17.23 @ 10:35) >                                                                                                        Impression:          - The entire examined colon is normal on direct and retroflexion views.                       - No specimens collected.  Recommendation:      - Resume previous diet today.                       - No large polyps or masses detected, No objection from GI standpoint to                 proceed with heart transplantation/advanced heart therapies.                       - Please call back should any further questions or concerns arise.    < end of copied text >     < from: Xray Chest 1 View- PORTABLE-Urgent (11.01.23 @ 07:42) >    IMPRESSION:  Clear lungs.    ---End of Report ---        < end of copied text >  < from: TTE W or WO Ultrasound Enhancing Agent (11.01.23 @ 10:23) >  _____________________________     CONCLUSIONS:      1. Left ventricular cavity is moderately dilated. Left ventricular wall thickness is normal. Left ventricular systolic function is severely decreased with an ejection fraction of 32 % by Chinchilla's method of disks. Regional wall motion abnormalities present.   2. Multiple segmental abnormalities exist. See findings.   3. There is moderate (grade 2) left ventricular diastolic dysfunction, with indeterminant filling pressure.   4. Normal right ventricular cavity size, wall thickness, and systolic function.   5. No significant valvular disease.   6. No pericardial effusion seen.   7. Compared to the transthoracic echocardiogram performed on 1/25/2017 the areas of akinesis are unchanged but there has been a decline in LV systolic function with new areas of hypokinesis.    __________________________________________________________________    < end of copied text >  < from: TTE Limited W or WO Ultrasound Enhancing Agent (11.02.23 @ 07:41) >  __________________________     CONCLUSIONS:      1. After obtaining consent, Definity ultrasound enhancing agent was given for enhanced left ventricular opacification and improved delineation of the left ventricular endocardial borders. Left ventricular systolic function is severely decreased with a calculated ejection fraction of 22 % by the Chinchilla's biplane method of disks. There is a left ventricular thrombus.   2. Findings were discussed with Litzy BOSS on 11/2/2023 at 8.49am.   3. There is a left ventricular thrombus.    _________________________________________________________________    < end of copied text >

## 2023-11-29 NOTE — PROGRESS NOTE ADULT - ASSESSMENT
59 yo M with PMHx of HTN, CAD w/ 1 stent in 2009, ICH (2008) presented on 11/1 with abn ekg. Patient presented to [facility] where he was found to have STEMI, recommended to get cath however patient did not want to get it there so he left and came here.   EKG here with ST depression in lateral leads and elevation in anterior leads   Prior to [facility] found to be tachycardic, dyspneic, intubated   [facility] 11/1 with chronic total occlusion of LAD and RCA, with elevated RA and PA pressures  TTE 11/1 with severely decreased EF 32%, s/p IABP 11/1    RVP (11/1) Positive for COVID19  RVP (11/10) Negative  BCx (11/2, 11/4) NGTD  ETT Culture (11/2) NGTD  MRSA/MSSA PCR (11/2, 11/10) Negative  UA (11/10) 1 WBC    CXR (11/10) Clear Lungs  TTE (11/2) Left ventricular thrombus.    #Enterococcus Bacteremia, Leukocytosis, Pre-Heart Transplant Evaluation Serologies  Concern for either central line or urinary source  CT A/P Noncontrast (11/18) No acute process  Given mixed cultures with Staph epi and Enterococcus faecalis would continue Vancomycin   Patient's penicillin allergy is questionable and patient does not recall reaction.   -- susceptibility of Enterococcus faecalis shows Vanco and Ampi sensitive  --Follow up on repeat blood cultures from 11/18 are negative  -- Completing ten-parisi of IV Vancomycin last dose received on 11/27    Given evidence of clearing of bacteremia could move forward from an ID perspective to pursue advanced cardiac therapies- listed for transplant status 2E      #Encounter to Vaccinate Patient  COVID19: COVID19 Infection This Admission. Would consider Vaccination in ~3 months  Influenza: declined  Pneumococcal: Would benefit from PCV20  HAV: received first dose 11/24  HBV: received first dose Heplisav 11/24  MMR: Not Mumps Immune, if >1 month until transplant would consider MMR dose  Varicella: Immune  Shingles: recommend Shingrix series  Tdap: received Tdap booster this admission      Chao Peters MD  Can be called via Teams  After 5pm/weekends [phone]

## 2023-11-29 NOTE — PROGRESS NOTE ADULT - ASSESSMENT
59 yo M with HFrEF (LVIDd 6.4 cm, LVEF 32%), CAD s/p PCI (2008), HTN, DMT2 (A1c 8.3%) and CVA s/p TPA (2018), recently treated for PNA who initially presented to [facility] via EMS after syncope reportedly requiring defibrilation. Treated for ACS but left AMA to come to [facility]. On arrival ECG with ST depression in lateral leads. Intubated 11/1 for respiratory failure. Found to have COVID. L/RHC 11/1revealing  of LAD and RCA, elevated filling pressures and CI of 1.5 prompting placement of IABP. Extubated 11/3. IABP was weaned to off 11/7, then the following day on 11/8, developed PMVT cardiac arrest with prompt CPR and defibrillation, started on IV Lidocaine and Amio. IABP ultimately replaced on 11/9 for worsening hypotension. TTE 11/11 revealing LV thrombus.     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 AT evaluation launched 11/10, he is ABO A. Currently well supported on IABP at 1:1 without further arrhythmias.  He was discussed during TSC on 11/16 and was approved for listing, however was found to be Bacteremic with 11/17 Blc Cx growing mixed cultures Staph epi and Enterococcus faecalis and started on IV Vanco. Repeat cultures from 11/18 reveal NGTD and therefore was cleared by ID for listing. He's currently afebrile, now plateaued leukocytosis with completion of Vanco. He is hemodynamically stable with MAPS 90-100s on IABP 1:1 on high dose oral vaosdilators/GDMT. Labs today notable for rise in his Cr to 1.9 and U/A notable for hyaline crystals which may suggest he is low euvolemic. He was listed on 11/22 UNOS Status 2, he is ABO A, PRA's 0. Awaiting suitable donor.       Cardiac Studies  11/21/23 TTE: limited unable to rule out endocarditis, technically difficult study  11/1123 TTE: LVEF 22% (global), LV thrombus, normal RV size and function, mild MR, trace TR  11/1/23  LHC showed pLAD 70 % stenosis in the ostium portion of the segment and 100 % in-stent restenosis and in mRCA, 100 % stenosis.   11/1/23 RHC; RA 23 Vw 24, PA 52/33/40, PCWP 30 Vw 33, AO 85/66/73, PA sat 54.4%, CO/CI (F) 2.96/1.44, IABP was placed during the cath through R common femoral artery.   11/1/23 TTE: LVIDd 6.4cm , LVEF 32%, regional WMA, grade II DD,  TAPSE 1.7cm, mld MR, trace TR,     Bedside hemodynamics  11/25 IABP 1:1: CVP 3, PA  33-36/12 mean PA 20-23 PCWP 15-16, MVO2 72.0%, CO/CI 6.2/2.8,   dsc? (PVR 0.8-1.1 medina calculated by me)   11/3: IABP 1:1 RA 5; PA 27/12/18; CO/CI 5.6/2.6; MAP 90-100s.  11/4/23 IABP 1:3 CVP 4 PA 37/18 SvO2 83.8 CO/CI 11.2 CI 5.1  (in the setting of fever to 38.3C)  11/5 IABP 1:1 CVP 3 PA 48/27 SvO2 78.4% CO 8.2 CI 3.7   11/1 (IABP 1:1): CVP 1, PA 33/5/16, MvO2 74.3%

## 2023-11-29 NOTE — PROGRESS NOTE ADULT - ASSESSMENT
====================ASSESSMENT ==============  58 male with HTN, CAD (s/p PCI 2008), HFrEF, CVA 2018, and T2DM presenting with chest pressure and unknown tachycardia that was shocked x1, [facility] 11/1 found to have in-stent restenosis of pLAD and  of RCA with elevated RA and PA pressures and severely decreased EF 32%. Admitted to CICU for management of cardiogenic shock and ADHF requiring IABP 11/1 -11/7, with hospital course c/b vfib arrest requiring reinsertion of IABP. Currently undergoing workup for AT evaluation with HF.    Plan:  ====================== NEUROLOGY=====================  # Anxiety  - No Seroquel/antipsychotics since vfib arrest and prolonged QTC  - Psych Eval, recommended SSRI, but pt. refused   - Psych recommending atarax PRN  - Continue to monitor mental status    # PT/Conditioning  - Continue band exercised while on bedrest s/t IABP, IS    ==================== RESPIRATORY======================  # Acute Hypoxemic Respiratory Failure  - s/p x2 intubations for cardiogenic pulm edema and the in setting of cardiac arrest, resolved - extubated 11/10  - Currently on room air with spO2 mid 90s  - Continue incentive spirometry and monitoring of sp02    # Asthma  - c/w albuterol, symbicort and spiriva  - On trelegy at home    ====================CARDIOVASCULAR====================  # Vfib arrest i/s/o ischemia  - Lido gtt off 11/13  - PO Amio load - total of 5g per EP complete 11/17, continue PO amio  - Keep K > 4, Mag > 2.2     # Cardiogenic shock requiring IABP (11/1- 11/7, 11/9-)  - Likely 2/2 NSTEMI and ADHF  - 11/1 LHC: pLAD 100 % in-stent restenosis & mRCA, 100 %. PCWP 30. IABP placed.  - 11/1 TTE: LV dilated. EF 32 %. Regional WMAs present, mod (grade 2) LV diastolic dysfunction  - 11/2 TTE: EF 22% and + LV thrombus  - PRN Bumex IVP as needed, appears euvolemic on exam  - IABP swapped 11/20 to RFA, continue 1:1 support  - Off Milrinone gtt @ 7:30 am 11/11  - Currently on hydralazine 100 TID and ISD 40 TID for AL reduction  - [place] d/c'd due to elevated K levels  - c/w coreg 25 BID for GDMT  - Listed OHT status 2 11/22, f/u HF recs    # NSTEMI iso stent re-occlusion of pLAD and 100%  of RCA  - EKG on admission w/LBBB  - DAPT: c/w ASA, Brilinta d/c'd per transplant w/u  - c/w lipitor 80  - cMR deferred given necessity of IABP  - CT sx not recommending CABG, undergoing AT eval    # LV thrombus  - c/w heparin gtt    ===================== RENAL =========================  # Non-oliguric ARIC   - sCr 1.91 today, Baseline Cr: 1-1.22  - Renal US - no evidence of renal artery stenosis  - Trend BMP, lytes daily, replace as needed  - Continue Strict I/Os, avoid nephrotoxins    # Mild Hyponatremia/Hyperkalemia  - iso ARIC and or new CKD baseline  - continue fluid restriction   - continue urea powder as per renal  - trend daily    ==================== GASTROINTESTINAL===================  #Constipation/ ileus  - CT abdomen   - continue clears   - continue lactulose q4h  - monitor for BM    ===================HEMATOLOGIC/ONC ===================  # VTE prophylaxis   - c/w heparin gtt given LV Thrombus and IABP  - H/H and platelets stable, continue to trend daily    =======================    ENDOCRINE  =====================  # Type 2 DM  - A1c 8.3, glucose   - Continue lantus, premeal, low ISS  - continue FS ACHS    ========================INFECTIOUS DISEASE================  # Enterococcus faecalis bacteremia  - BCx 11/17+ for enterococcus faecalis x2, Staph epi x1 (likely contaminant)- pan sensitive   - Urine cx 11/18 + enterococcus faecalis  - BCx 11/18 NGTD  - IABP site swapped to RFA 11/20  - continue vancomycin 1g q12h (11/18- stop 11/27 per ID)  - CT A/P negative for infectious pathology    # COVID, resolved  - Off airborne precautions 11/11    # Pre-transplant ID w/u   - Trend ID recs for serologies   - Colonoscopy 11/17 - normal   - Chest CT 11/17 - improved LLL aeration  - Pt. requiring vaccines prior to transplant, would initiate series accordingly  - Received hep A and hep B vaccines thus far    Lines: R femoral IABP (11/20-    Ethics/Dispo: full code. critical - remain in [facility]    Kitty Braden, [facility]   ====================ASSESSMENT ==============  58 male with HTN, CAD (s/p PCI 2008), HFrEF, CVA 2018, and T2DM presenting with chest pressure and unknown tachycardia that was shocked x1, [facility] 11/1 found to have in-stent restenosis of pLAD and  of RCA with elevated RA and PA pressures and severely decreased EF 32%. Admitted to CICU for management of cardiogenic shock and ADHF requiring IABP 11/1 -11/7, with hospital course c/b vfib arrest requiring reinsertion of IABP. Currently undergoing workup for AT evaluation with HF.    Plan:  ====================== NEUROLOGY=====================  # Anxiety  - No Seroquel/antipsychotics since vfib arrest and prolonged QTC  - Psych Eval, recommended SSRI, but pt. refused   - Psych recommending atarax PRN  - Continue to monitor mental status    # PT/Conditioning  - Continue band exercised while on bedrest s/t IABP, IS    ==================== RESPIRATORY======================  # Acute Hypoxemic Respiratory Failure  - s/p x2 intubations for cardiogenic pulm edema and the in setting of cardiac arrest, resolved - extubated 11/10  - Currently on room air with spO2 mid 90s  - Continue incentive spirometry and monitoring of sp02    # Asthma  - c/w albuterol, symbicort and spiriva  - On trelegy at home    ====================CARDIOVASCULAR====================  # Vfib arrest i/s/o ischemia  - Lido gtt off 11/13  - PO Amio load - total of 5g per EP complete 11/17, continue PO amio  - Keep K > 4, Mag > 2.2     # Cardiogenic shock requiring IABP (11/1- 11/7, 11/9-)  - Likely 2/2 NSTEMI and ADHF  - 11/1 LHC: pLAD 100 % in-stent restenosis & mRCA, 100 %. PCWP 30. IABP placed.  - 11/1 TTE: LV dilated. EF 32 %. Regional WMAs present, mod (grade 2) LV diastolic dysfunction  - 11/2 TTE: EF 22% and + LV thrombus  - PRN Bumex IVP as needed, appears dry - 500 cc bolus ordered  - IABP swapped 11/20 to RFA, continue 1:1 support  - Off Milrinone gtt @ 7:30 am 11/11  - Currently on hydralazine 100 TID and ISD 40 TID for AL reduction  - [place] d/c'd due to elevated K levels  - c/w coreg 25 BID for GDMT  - Listed OHT status 2 11/22, f/u HF recs    # NSTEMI iso stent re-occlusion of pLAD and 100%  of RCA  - EKG on admission w/LBBB  - DAPT: c/w ASA, Brilinta d/c'd per transplant w/u  - c/w lipitor 80  - cMR deferred given necessity of IABP  - CT sx not recommending CABG, undergoing AT eval    # LV thrombus  - c/w heparin gtt    ===================== RENAL =========================  # Non-oliguric ARIC   - sCr 1.91 today, Baseline Cr: 1-1.22  - Renal US - no evidence of renal artery stenosis  - Trend BMP, lytes daily, replace as needed  - Continue Strict I/Os, avoid nephrotoxins  - f/u afternoon CMP    # Mild Hyponatremia/Hyperkalemia  - iso ARIC and or new CKD baseline  - continue fluid restriction   - continue urea powder as per renal  - trend daily    ==================== GASTROINTESTINAL===================  #Constipation/ ileus  - advance diet as tolerated  - continue lactulose q4h  - monitor for BM    ===================HEMATOLOGIC/ONC ===================  # VTE prophylaxis   - c/w heparin gtt given LV Thrombus and IABP  - H/H and platelets stable, continue to trend daily    =======================    ENDOCRINE  =====================  # Type 2 DM  - A1c 8.3, glucose   - Continue lantus, premeal, low ISS  - continue FS ACHS    ========================INFECTIOUS DISEASE================  # Enterococcus faecalis bacteremia  - BCx 11/17+ for enterococcus faecalis x2, Staph epi x1 (likely contaminant)- pan sensitive   - Urine cx 11/18 + enterococcus faecalis  - BCx 11/18 NGTD  - IABP site swapped to RFA 11/20  - continue vancomycin 1g q12h (11/18- stop 11/27 per ID)  - CT A/P negative for infectious pathology    # COVID, resolved  - Off airborne precautions 11/11    # Pre-transplant ID w/u   - Trend ID recs for serologies   - Colonoscopy 11/17 - normal   - Chest CT 11/17 - improved LLL aeration  - Pt. requiring vaccines prior to transplant, would initiate series accordingly  - Received hep A and hep B vaccines thus far    Lines: R femoral IABP (11/20-    Ethics/Dispo: full code. critical - remain in [facility]    DOUGLAS [facility]erra[facility]

## 2023-11-29 NOTE — PROGRESS NOTE ADULT - SUBJECTIVE AND OBJECTIVE BOX
ADVANCED HEART FAILURE & TRANSPLANT  - PROGRESS NOTE  *To reach the NS2 Team from 8am to 5pm (MON-FRI), please call [phone].   _______________________________________________________________________________________________________    Subjective:    Medications:  aMIOdarone    Tablet 200 milliGRAM(s) Oral daily  aMIOdarone    Tablet   Oral   aspirin  chewable 81 milliGRAM(s) Oral daily  atorvastatin 80 milliGRAM(s) Oral at bedtime  budesonide  80 MICROgram(s)/formoterol 4.5 MICROgram(s) Inhaler 2 Puff(s) Inhalation two times a day  carvedilol 25 milliGRAM(s) Oral every 12 hours  chlorhexidine 2% Cloths 1 Application(s) Topical daily  heparin  Infusion 1300 Unit(s)/Hr IV Continuous <Continuous>  hydrALAZINE 100 milliGRAM(s) Oral three times a day  hydrOXYzine hydrochloride 25 milliGRAM(s) Oral two times a day PRN  insulin glargine Injectable (LANTUS) 12 Unit(s) SubCutaneous at bedtime  insulin lispro (ADMELOG) corrective regimen sliding scale   SubCutaneous three times a day before meals  insulin lispro (ADMELOG) corrective regimen sliding scale   SubCutaneous at bedtime  insulin lispro Injectable (ADMELOG) 10 Unit(s) SubCutaneous three times a day before meals  isosorbide   dinitrate Tablet (ISORDIL) 40 milliGRAM(s) Oral three times a day  lactated ringers Bolus 500 milliLiter(s) IV Bolus once  lactulose Syrup 30 Gram(s) Oral every 6 hours  polyethylene glycol 3350 17 Gram(s) Oral two times a day  senna 2 Tablet(s) Oral at bedtime  urea Oral Powder 15 Gram(s) Oral two times a day  zoster Vaccine recombinant (SHINGRIX) 0.5 milliLiter(s) IntraMuscular once      Physical Exam:    Vitals:  Vital Signs Last 24 Hours  T(C): 36.8 (23 @ 03:00), Max: 37.3 (23 @ 15:00)  HR: 70 (23 @ 06:00) (70 - 82)  BP: --  RR: 18 (23 @ 06:00) (14 - 24)  SpO2: 96% (23 @ 06:00) (95% - 98%)    Weight in k.3 ( @ 06:00)    I&O's Summary    2023 07:01  -  2023 07:00  --------------------------------------------------------  IN: 639 mL / OUT: 825 mL / NET: -186 mL        Tele:    General: No distress. Comfortable.  HEENT: EOM intact.  Neck: Neck supple. JVP not elevated. No masses  Chest: Clear to auscultation bilaterally  CV: Normal S1 and S2. No murmurs, rub, or gallops. Radial pulses normal.  Abdomen: Soft, non-distended, non-tender  Skin: No rashes or skin breakdown  Extremities: No LE edema  Neurology: Alert and oriented times three. Sensation intact  Psych: Affect normal    Labs:                        12.1   12.85 )-----------( 184      ( 2023 05:37 )             36.3         127<L>  |  96  |  49<H>  ----------------------------<  162<H>  3.8   |  17<L>  |  1.96<H>    Ca    9.8      2023 05:37  Phos  4.6       Mg     2.4         TPro  7.5  /  Alb  3.9  /  TBili  0.5  /  DBili  x   /  AST  19  /  ALT  33  /  AlkPhos  76      PT/INR - ( 2023 02:25 )   PT: 13.0 sec;   INR: 1.19 ratio         PTT - ( 2023 05:37 )  PTT:74.3 sec            Lactate, Blood: 1.9 mmol/L ( @ 05:37)  Lactate, Blood: 1.4 mmol/L ( @ 03:09)     ADVANCED HEART FAILURE & TRANSPLANT  - PROGRESS NOTE  *To reach the NS2 Team from 8am to 5pm (MON-FRI), please call [phone].   _______________________________________________________________________________________________________    Subjective:  - NAEO  - Having multiple BM's, denies ABD discomfort   - Remains on IABP 1:1; R Groin site benign      Medications:  aMIOdarone    Tablet 200 milliGRAM(s) Oral daily  aMIOdarone    Tablet   Oral   aspirin  chewable 81 milliGRAM(s) Oral daily  atorvastatin 80 milliGRAM(s) Oral at bedtime  budesonide  80 MICROgram(s)/formoterol 4.5 MICROgram(s) Inhaler 2 Puff(s) Inhalation two times a day  carvedilol 25 milliGRAM(s) Oral every 12 hours  chlorhexidine 2% Cloths 1 Application(s) Topical daily  heparin  Infusion 1300 Unit(s)/Hr IV Continuous <Continuous>  hydrALAZINE 100 milliGRAM(s) Oral three times a day  hydrOXYzine hydrochloride 25 milliGRAM(s) Oral two times a day PRN  insulin glargine Injectable (LANTUS) 12 Unit(s) SubCutaneous at bedtime  insulin lispro (ADMELOG) corrective regimen sliding scale   SubCutaneous three times a day before meals  insulin lispro (ADMELOG) corrective regimen sliding scale   SubCutaneous at bedtime  insulin lispro Injectable (ADMELOG) 10 Unit(s) SubCutaneous three times a day before meals  isosorbide   dinitrate Tablet (ISORDIL) 40 milliGRAM(s) Oral three times a day  lactated ringers Bolus 500 milliLiter(s) IV Bolus once  lactulose Syrup 30 Gram(s) Oral every 6 hours  polyethylene glycol 3350 17 Gram(s) Oral two times a day  senna 2 Tablet(s) Oral at bedtime  urea Oral Powder 15 Gram(s) Oral two times a day  zoster Vaccine recombinant (SHINGRIX) 0.5 milliLiter(s) IntraMuscular once      Physical Exam:    Vitals:  Vital Signs Last 24 Hours  T(C): 36.8 (23 @ 03:00), Max: 37.3 (23 @ 15:00)  HR: 70 (23 @ 06:00) (70 - 82)  Aug mean: 68-80  RR: 18 (23 @ 06:00) (14 - 24)  SpO2: 96% (23 @ 06:00) (95% - 98%)    Weight in k.3 ( @ :00)    I&O's Summary    2023 07:01  -  2023 07:00  --------------------------------------------------------  IN: 639 mL / OUT: 825 mL / NET: -186 mL    Tele: SR 70's    General: No distress. Comfortable.  HEENT: EOM intact.  Neck: Neck supple. JVP not elevated. No masses  Chest: Clear to auscultation bilaterally  CV: Normal S1 and S2. No murmurs, rub, or gallops. Radial pulses normal, warm peripherally  Abdomen: Soft, non-distended, non-tender  Skin: No rashes or skin breakdown, L Groin IABP site benign.   Extremities: No LE edema  Neurology: Alert and oriented times three. Sensation intact  Psych: Affect normal    Labs:                        12.1   12.85 )-----------( 184      ( 2023 05:37 )             36.3         127<L>  |  96  |  49<H>  ----------------------------<  162<H>  3.8   |  17<L>  |  1.96<H>    Ca    9.8      2023 05:37  Phos  4.6       Mg     2.4         TPro  7.5  /  Alb  3.9  /  TBili  0.5  /  DBili  x   /  AST  19  /  ALT  33  /  AlkPhos  76    PT/INR - ( 2023 02:25 )   PT: 13.0 sec;   INR: 1.19 ratio    PTT - ( 2023 05:37 )  PTT:74.3 sec    Lactate, Blood: 1.9 mmol/L ( @ 05:37)  Lactate, Blood: 1.4 mmol/L ( @ 03:09)

## 2023-11-29 NOTE — PROGRESS NOTE ADULT - PROBLEM SELECTOR PLAN 4
- Cr on admission 1.2, peaked to 4  - Was stable around 1.4-1.5; today 1.9  - Support as above.  - Appreciate CardioRenal input

## 2023-11-29 NOTE — PROGRESS NOTE ADULT - SUBJECTIVE AND OBJECTIVE BOX
PATIENT:  DEVORAH VALENCIA  [id number]    CHIEF COMPLAINT:  Patient is a 58y old  Male who presents with a chief complaint of Cardiogenic shock (28 Nov 2023 21:48)      INTERVAL HISTORYOVERNIGHT EVENTS:      REVIEW OF SYSTEMS:    Constitutional:     [ ] negative [ ] fevers [ ] chills [ ] weight loss [ ] weight gain  HEENT:                  [ ] negative [ ] dry eyes [ ] eye irritation [ ] postnasal drip [ ] nasal congestion  CV:                         [ ] negative  [ ] chest pain [ ] orthopnea [ ] palpitations [ ] murmur  Resp:                     [ ] negative [ ] cough [ ] shortness of breath [ ] dyspnea [ ] wheezing [ ] sputum [ ] hemoptysis  GI:                          [ ] negative [ ] nausea [ ] vomiting [ ] diarrhea [ ] constipation [ ] abd pain [ ] dysphagia   :                        [ ] negative [ ] dysuria [ ] nocturia [ ] hematuria [ ] increased urinary frequency  Musculoskeletal: [ ] negative [ ] back pain [ ] myalgias [ ] arthralgias [ ] fracture  Skin:                       [ ] negative [ ] rash [ ] itch  Neurological:        [ ] negative [ ] headache [ ] dizziness [ ] syncope [ ] weakness [ ] numbness  Psychiatric:           [ ] negative [ ] anxiety [ ] depression  Endocrine:            [ ] negative [ ] diabetes [ ] thyroid problem  Heme/Lymph:      [ ] negative [ ] anemia [ ] bleeding problem  Allergic/Immune: [ ] negative [ ] itchy eyes [ ] nasal discharge [ ] hives [ ] angioedema    [ ] All other systems negative  [ ] Unable to assess ROS because ________.    MEDICATIONS:  MEDICATIONS  (STANDING):  aMIOdarone    Tablet   Oral   aMIOdarone    Tablet 200 milliGRAM(s) Oral daily  aspirin  chewable 81 milliGRAM(s) Oral daily  atorvastatin 80 milliGRAM(s) Oral at bedtime  budesonide  80 MICROgram(s)/formoterol 4.5 MICROgram(s) Inhaler 2 Puff(s) Inhalation two times a day  carvedilol 25 milliGRAM(s) Oral every 12 hours  chlorhexidine 2% Cloths 1 Application(s) Topical daily  heparin  Infusion 1300 Unit(s)/Hr (13 mL/Hr) IV Continuous <Continuous>  hydrALAZINE 100 milliGRAM(s) Oral three times a day  insulin glargine Injectable (LANTUS) 12 Unit(s) SubCutaneous at bedtime  insulin lispro (ADMELOG) corrective regimen sliding scale   SubCutaneous at bedtime  insulin lispro (ADMELOG) corrective regimen sliding scale   SubCutaneous three times a day before meals  insulin lispro Injectable (ADMELOG) 8 Unit(s) SubCutaneous three times a day before meals  isosorbide   dinitrate Tablet (ISORDIL) 40 milliGRAM(s) Oral three times a day  lactulose Syrup 30 Gram(s) Oral every 6 hours  polyethylene glycol 3350 17 Gram(s) Oral two times a day  senna 2 Tablet(s) Oral at bedtime  urea Oral Powder 15 Gram(s) Oral two times a day  zoster Vaccine recombinant (SHINGRIX) 0.5 milliLiter(s) IntraMuscular once    MEDICATIONS  (PRN):  hydrOXYzine hydrochloride 25 milliGRAM(s) Oral two times a day PRN Anxiety      ALLERGIES:  Allergies    penicillins (Unknown)    Intolerances        OBJECTIVE:  ICU Vital Signs Last 24 Hrs  T(C): 36.8 (29 Nov 2023 03:00), Max: 37.3 (28 Nov 2023 15:00)  T(F): 98.3 (29 Nov 2023 03:00), Max: 99.1 (28 Nov 2023 15:00)  HR: 70 (29 Nov 2023 06:00) (70 - 82)  BP: --  BP(mean): --  ABP: --  ABP(mean): --  RR: 18 (29 Nov 2023 06:00) (13 - 24)  SpO2: 96% (29 Nov 2023 06:00) (95% - 98%)    O2 Parameters below as of 29 Nov 2023 06:00  Patient On (Oxygen Delivery Method): room air            Adult Advanced Hemodynamics Last 24 Hrs  CVP(mm Hg): --  CVP(cm H2O): --  CO: --  CI: --  PA: --  PA(mean): --  PCWP: --  SVR: --  SVRI: --  PVR: --  PVRI: --  CAPILLARY BLOOD GLUCOSE      POCT Blood Glucose.: 213 mg/dL (28 Nov 2023 21:27)  POCT Blood Glucose.: 183 mg/dL (28 Nov 2023 18:02)  POCT Blood Glucose.: 173 mg/dL (28 Nov 2023 12:25)  POCT Blood Glucose.: 192 mg/dL (28 Nov 2023 08:46)    CAPILLARY BLOOD GLUCOSE      POCT Blood Glucose.: 213 mg/dL (28 Nov 2023 21:27)    I&O's Summary    27 Nov 2023 07:01  -  28 Nov 2023 07:00  --------------------------------------------------------  IN: 1369 mL / OUT: 1925 mL / NET: -556 mL    28 Nov 2023 07:01  -  29 Nov 2023 06:54  --------------------------------------------------------  IN: 639 mL / OUT: 825 mL / NET: -186 mL      Daily     Daily     PHYSICAL EXAMINATION:  General: WN/WD NAD  HEENT: PERRLA, EOMI, moist mucous membranes  Neurology: A&Ox3, nonfocal, MOISE x 4  Respiratory: CTA B/L, normal respiratory effort, no wheezes, crackles, rales  CV: RRR, S1S2, no murmurs, rubs or gallops  Abdominal: Soft, NT, ND +BS, Last BM  Extremities: No edema, + peripheral pulses  Incisions:   Tubes:    LABS:                          12.1   12.85 )-----------( 184      ( 29 Nov 2023 05:37 )             36.3     11-29    127<L>  |  96  |  49<H>  ----------------------------<  162<H>  3.8   |  17<L>  |  1.96<H>    Ca    9.8      29 Nov 2023 05:37  Phos  4.6     11-29  Mg     2.4     11-29    TPro  7.5  /  Alb  3.9  /  TBili  0.5  /  DBili  x   /  AST  19  /  ALT  33  /  AlkPhos  76  11-29    LIVER FUNCTIONS - ( 29 Nov 2023 05:37 )  Alb: 3.9 g/dL / Pro: 7.5 g/dL / ALK PHOS: 76 U/L / ALT: 33 U/L / AST: 19 U/L / GGT: x           PT/INR - ( 28 Nov 2023 02:25 )   PT: 13.0 sec;   INR: 1.19 ratio         PTT - ( 29 Nov 2023 05:37 )  PTT:74.3 sec        Urinalysis Basic - ( 29 Nov 2023 05:37 )    Color: x / Appearance: x / SG: x / pH: x  Gluc: 162 mg/dL / Ketone: x  / Bili: x / Urobili: x   Blood: x / Protein: x / Nitrite: x   Leuk Esterase: x / RBC: x / WBC x   Sq Epi: x / Non Sq Epi: x / Bacteria: x        TELEMETRY:     EKG:     IMAGING:       PATIENT:  DEVORAH VALENCIA  [id number]    CHIEF COMPLAINT:  Patient is a 58y old  Male who presents with a chief complaint of Cardiogenic shock (28 Nov 2023 21:48)      INTERVAL HISTORYOVERNIGHT EVENTS:      REVIEW OF SYSTEMS:    Constitutional:     [x ] negative [ ] fevers [ ] chills [ ] weight loss [ ] weight gain  HEENT:                  [ x] negative [ ] dry eyes [ ] eye irritation [ ] postnasal drip [ ] nasal congestion  CV:                         [x ] negative  [ ] chest pain [ ] orthopnea [ ] palpitations [ ] murmur  Resp:                     [x ] negative [ ] cough [ ] shortness of breath [ ] dyspnea [ ] wheezing [ ] sputum [ ] hemoptysis  GI:                          [ x] negative [ ] nausea [ ] vomiting [ ] diarrhea [ ] constipation [ ] abd pain [ ] dysphagia   :                        [x ] negative [ ] dysuria [ ] nocturia [ ] hematuria [ ] increased urinary frequency  Musculoskeletal: [x ] negative [ ] back pain [ ] myalgias [ ] arthralgias [ ] fracture  Skin:                       [ x] negative [ ] rash [ ] itch  Neurological:        [x ] negative [ ] headache [ ] dizziness [ ] syncope [ ] weakness [ ] numbness    [ ] All other systems negative  [ ] Unable to assess ROS because ________.    MEDICATIONS:  MEDICATIONS  (STANDING):  aMIOdarone    Tablet   Oral   aMIOdarone    Tablet 200 milliGRAM(s) Oral daily  aspirin  chewable 81 milliGRAM(s) Oral daily  atorvastatin 80 milliGRAM(s) Oral at bedtime  budesonide  80 MICROgram(s)/formoterol 4.5 MICROgram(s) Inhaler 2 Puff(s) Inhalation two times a day  carvedilol 25 milliGRAM(s) Oral every 12 hours  chlorhexidine 2% Cloths 1 Application(s) Topical daily  heparin  Infusion 1300 Unit(s)/Hr (13 mL/Hr) IV Continuous <Continuous>  hydrALAZINE 100 milliGRAM(s) Oral three times a day  insulin glargine Injectable (LANTUS) 12 Unit(s) SubCutaneous at bedtime  insulin lispro (ADMELOG) corrective regimen sliding scale   SubCutaneous at bedtime  insulin lispro (ADMELOG) corrective regimen sliding scale   SubCutaneous three times a day before meals  insulin lispro Injectable (ADMELOG) 8 Unit(s) SubCutaneous three times a day before meals  isosorbide   dinitrate Tablet (ISORDIL) 40 milliGRAM(s) Oral three times a day  lactulose Syrup 30 Gram(s) Oral every 6 hours  polyethylene glycol 3350 17 Gram(s) Oral two times a day  senna 2 Tablet(s) Oral at bedtime  urea Oral Powder 15 Gram(s) Oral two times a day  zoster Vaccine recombinant (SHINGRIX) 0.5 milliLiter(s) IntraMuscular once    MEDICATIONS  (PRN):  hydrOXYzine hydrochloride 25 milliGRAM(s) Oral two times a day PRN Anxiety      ALLERGIES:  Allergies    penicillins (Unknown)    Intolerances        OBJECTIVE:  ICU Vital Signs Last 24 Hrs  T(C): 36.8 (29 Nov 2023 03:00), Max: 37.3 (28 Nov 2023 15:00)  T(F): 98.3 (29 Nov 2023 03:00), Max: 99.1 (28 Nov 2023 15:00)  HR: 70 (29 Nov 2023 06:00) (70 - 82)  BP: --  BP(mean): --  ABP: --  ABP(mean): --  RR: 18 (29 Nov 2023 06:00) (13 - 24)  SpO2: 96% (29 Nov 2023 06:00) (95% - 98%)    O2 Parameters below as of 29 Nov 2023 06:00  Patient On (Oxygen Delivery Method): room air      POCT Blood Glucose.: 213 mg/dL (28 Nov 2023 21:27)  POCT Blood Glucose.: 183 mg/dL (28 Nov 2023 18:02)  POCT Blood Glucose.: 173 mg/dL (28 Nov 2023 12:25)  POCT Blood Glucose.: 192 mg/dL (28 Nov 2023 08:46)    CAPILLARY BLOOD GLUCOSE      POCT Blood Glucose.: 213 mg/dL (28 Nov 2023 21:27)    I&O's Summary    27 Nov 2023 07:01  -  28 Nov 2023 07:00  --------------------------------------------------------  IN: 1369 mL / OUT: 1925 mL / NET: -556 mL    28 Nov 2023 07:01 - 29 Nov 2023 06:54  --------------------------------------------------------  IN: 639 mL / OUT: 825 mL / NET: -186 mL      Daily     Daily     PHYSICAL EXAMINATION:  General: alert, in no acute distress  HEENT: PERRLA, EOMI, moist mucous membranes  Neurology: A&Ox3, nonfocal, MOISE x 4  Respiratory: CTA B/L, normal respiratory effort, no wheezes, crackles, rales  CV: RRR, S1S2, no murmurs, rubs or gallops  Abdominal: Soft, NT, ND +BS  Extremities: No edema, + palpable peripheral pulses  Skin: Warm and dry  Lines: R femoral IABP dressing clean, dry and intact    LABS:                          12.1   12.85 )-----------( 184      ( 29 Nov 2023 05:37 )             36.3     11-29    127<L>  |  96  |  49<H>  ----------------------------<  162<H>  3.8   |  17<L>  |  1.96<H>    Ca    9.8      29 Nov 2023 05:37  Phos  4.6     11-29  Mg     2.4     11-29    TPro  7.5  /  Alb  3.9  /  TBili  0.5  /  DBili  x   /  AST  19  /  ALT  33  /  AlkPhos  76  11-29    LIVER FUNCTIONS - ( 29 Nov 2023 05:37 )  Alb: 3.9 g/dL / Pro: 7.5 g/dL / ALK PHOS: 76 U/L / ALT: 33 U/L / AST: 19 U/L / GGT: x           PT/INR - ( 28 Nov 2023 02:25 )   PT: 13.0 sec;   INR: 1.19 ratio         PTT - ( 29 Nov 2023 05:37 )  PTT:74.3 sec        Urinalysis Basic - ( 29 Nov 2023 05:37 )    Color: x / Appearance: x / SG: x / pH: x  Gluc: 162 mg/dL / Ketone: x  / Bili: x / Urobili: x   Blood: x / Protein: x / Nitrite: x   Leuk Esterase: x / RBC: x / WBC x   Sq Epi: x / Non Sq Epi: x / Bacteria: x        TELEMETRY: reviewed    EKG: reviewed    IMAGING: reviewed

## 2023-11-29 NOTE — PROGRESS NOTE ADULT - ATTENDING COMMENTS
Inc SCr today 1.96    Repeat u studies    New skin rash, dec po intake    Would repeat labs this afternoon      If SCr still elevated- last bump was an error- then will do full workup- rash and dec po intake suggest this increase may be real    Hyponatremia- 127- MUST stop D5 diluent sources   ureNa see above  May give small dose HTS

## 2023-11-29 NOTE — PROGRESS NOTE ADULT - SUBJECTIVE AND OBJECTIVE BOX
INFECTIOUS DISEASES FOLLOW UP-- Courtney Peters  [phone]    This is a follow up note for this  58yMale with  Non-ST elevation myocardial infarction (NSTEMI)        ROS:  CONSTITUTIONAL:  No fever, good appetite  CARDIOVASCULAR:  No chest pain or palpitations  RESPIRATORY:  No dyspnea  GASTROINTESTINAL:  No nausea, vomiting, diarrhea, or abdominal pain  GENITOURINARY:  No dysuria  NEUROLOGIC:  No headache,     Allergies    penicillins (Unknown)    Intolerances        ANTIBIOTICS/RELEVANT:  antimicrobials    immunologic:  zoster Vaccine recombinant (SHINGRIX) 0.5 milliLiter(s) IntraMuscular once    OTHER:  aMIOdarone    Tablet   Oral   aMIOdarone    Tablet 200 milliGRAM(s) Oral daily  aspirin  chewable 81 milliGRAM(s) Oral daily  atorvastatin 80 milliGRAM(s) Oral at bedtime  budesonide  80 MICROgram(s)/formoterol 4.5 MICROgram(s) Inhaler 2 Puff(s) Inhalation two times a day  carvedilol 25 milliGRAM(s) Oral every 12 hours  chlorhexidine 2% Cloths 1 Application(s) Topical daily  heparin  Infusion 1300 Unit(s)/Hr IV Continuous <Continuous>  hydrALAZINE 100 milliGRAM(s) Oral three times a day  hydrOXYzine hydrochloride 25 milliGRAM(s) Oral two times a day PRN  insulin glargine Injectable (LANTUS) 12 Unit(s) SubCutaneous at bedtime  insulin lispro (ADMELOG) corrective regimen sliding scale   SubCutaneous at bedtime  insulin lispro (ADMELOG) corrective regimen sliding scale   SubCutaneous three times a day before meals  insulin lispro Injectable (ADMELOG) 9 Unit(s) SubCutaneous three times a day before meals  isosorbide   dinitrate Tablet (ISORDIL) 40 milliGRAM(s) Oral three times a day  polyethylene glycol 3350 17 Gram(s) Oral two times a day  senna 2 Tablet(s) Oral at bedtime  urea Oral Powder 15 Gram(s) Oral two times a day      Objective:  Vital Signs Last 24 Hrs  T(C): 36.6 (2023 15:00), Max: 36.8 (2023 23:00)  T(F): 97.9 (2023 15:00), Max: 98.3 (2023 23:00)  HR: 73 (2023 18:00) (67 - 82)  BP: 89/51 (2023 11:00) (89/51 - 89/51)  BP(mean): 64 (2023 11:00) (64 - 64)  RR: 21 (2023 18:00) (13 - 46)  SpO2: 96% (2023 18:00) (94% - 98%)    Parameters below as of 2023 15:10  Patient On (Oxygen Delivery Method): room air        PHYSICAL EXAM:  Constitutional:no acute distress  Eyes:MARVEL, EOMI  Ear/Nose/Throat: no oral lesions, 	  Respiratory: clear BL  Cardiovascular: S1S2  Gastrointestinal:soft, (+) BS, no tenderness  Extremities:no e/e/c has a folliculitis like rash bilateral shoulder regions  No Lymphadenopathy  IV sites not inflammed.    LABS:                        12.1   12.85 )-----------( 184      ( 2023 05:37 )             36.3         127<L>  |  96  |  56<H>  ----------------------------<  145<H>  4.5   |  18<L>  |  1.64<H>    Ca    9.4      2023 12:42  Phos  3.3       Mg     2.2         TPro  6.9  /  Alb  3.4  /  TBili  0.5  /  DBili  x   /  AST  16  /  ALT  29  /  AlkPhos  68  11-    PT/INR - ( 2023 02:25 )   PT: 13.0 sec;   INR: 1.19 ratio         PTT - ( 2023 05:37 )  PTT:74.3 sec  Urinalysis Basic - ( 2023 15:43 )    Color: Yellow / Appearance: Clear / S.022 / pH: x  Gluc: x / Ketone: Negative mg/dL  / Bili: Negative / Urobili: 0.2 mg/dL   Blood: x / Protein: Negative mg/dL / Nitrite: Negative   Leuk Esterase: Negative / RBC: 1 /HPF / WBC 0 /HPF   Sq Epi: x / Non Sq Epi: 1 /HPF / Bacteria: Negative /HPF        MICROBIOLOGY:      Urinalysis + Microscopic Examination (23 @ 15:43)    pH Urine: 5.5   Urine Appearance: Clear   Color: Yellow   Specific Gravity: 1.022   Protein, Urine: Negative mg/dL   Glucose Qualitative, Urine: 100 mg/dL   Ketone - Urine: Negative mg/dL   Blood, Urine: Negative   Bilirubin: Negative   Urobilinogen: 0.2 mg/dL   Leukocyte Esterase Concentration: Negative   Nitrite: Negative   Review: Reviewed   White Blood Cell - Urine: 0 /HPF   Red Blood Cell - Urine: 1 /HPF   Bacteria: Negative /HPF   Cast: 7 /LPF   Hyaline Casts: Present   Fine Granular Casts: Present   Epithelial Cells: 1 /HPF          RECENT CULTURES:  Culture - Blood (23 @ 18:45)    -  Ampicillin/Sulbactam: S <=8/4   -  Cefazolin: S <=4   -  Oxacillin: S <=0.25   -  Penicillin: R 1   -  Rifampin: S <=1 Should not be used as monotherapy   -  Tetracycline: S <=1   -  Clindamycin: S <=0.25   -  Erythromycin: R >4   -  Gentamicin: S <=1 Should not be used as monotherapy   -  Trimethoprim/Sulfamethoxazole: S <=0.5/9.5   -  Vancomycin: S 1   Gram Stain:   Growth in anaerobic bottle: Gram Positive Cocci in Pairs and Chains  Growth in aerobic bottle: Gram Positive Cocci in Pairs and Chains   Specimen Source: .Blood Blood   Organism: Staphylococcus epidermidis   Culture Results:   Growth in aerobic and anaerobic bottles: Enterococcus faecalis  See previous culture [id number]  Growth in aerobic bottle: Staphylococcus epidermidis   Organism Identification: Staphylococcus epidermidis   Method Type: SUNSHINE        RADIOLOGY & ADDITIONAL STUDIES:  < from: Xray Chest 1 View- PORTABLE-Routine (Xray Chest 1 View- PORTABLE-Routine in AM.) (23 @ 05:43) >    FINDINGS:  IABP marker is 2.8cm below top of aortic knob.  Heart is similar in size..  Lungs are clear. No pleural effusion or pneumothorax.    IMPRESSION:  Marker as noted.    < end of copied text >

## 2023-11-29 NOTE — PROGRESS NOTE ADULT - SUBJECTIVE AND OBJECTIVE BOX
[facility] DIVISION OF KIDNEY DISEASES AND HYPERTENSION -- FOLLOW UP NOTE  --------------------------------------------------------------------------------  Chief Complaint:    24 hour events/subjective:        PAST HISTORY  --------------------------------------------------------------------------------  No significant changes to PMH, PSH, FHx, SHx, unless otherwise noted    ALLERGIES & MEDICATIONS  --------------------------------------------------------------------------------  Allergies    penicillins (Unknown)    Intolerances      Standing Inpatient Medications  aMIOdarone    Tablet   Oral   aMIOdarone    Tablet 200 milliGRAM(s) Oral daily  aspirin  chewable 81 milliGRAM(s) Oral daily  atorvastatin 80 milliGRAM(s) Oral at bedtime  budesonide  80 MICROgram(s)/formoterol 4.5 MICROgram(s) Inhaler 2 Puff(s) Inhalation two times a day  carvedilol 25 milliGRAM(s) Oral every 12 hours  chlorhexidine 2% Cloths 1 Application(s) Topical daily  heparin  Infusion 1300 Unit(s)/Hr IV Continuous <Continuous>  hydrALAZINE 100 milliGRAM(s) Oral three times a day  insulin glargine Injectable (LANTUS) 12 Unit(s) SubCutaneous at bedtime  insulin lispro (ADMELOG) corrective regimen sliding scale   SubCutaneous three times a day before meals  insulin lispro (ADMELOG) corrective regimen sliding scale   SubCutaneous at bedtime  insulin lispro Injectable (ADMELOG) 10 Unit(s) SubCutaneous three times a day before meals  isosorbide   dinitrate Tablet (ISORDIL) 40 milliGRAM(s) Oral three times a day  polyethylene glycol 3350 17 Gram(s) Oral two times a day  senna 2 Tablet(s) Oral at bedtime  urea Oral Powder 15 Gram(s) Oral two times a day  zoster Vaccine recombinant (SHINGRIX) 0.5 milliLiter(s) IntraMuscular once    PRN Inpatient Medications  hydrOXYzine hydrochloride 25 milliGRAM(s) Oral two times a day PRN      REVIEW OF SYSTEMS  --------------------------------------------------------------------------------  Gen: No weight changes, fatigue, fevers/chills, weakness  Skin: No rashes  Head/Eyes/Ears/Mouth: No headache; Normal hearing; Normal vision w/o blurriness; No sinus pain/discomfort, sore throat  Respiratory: No dyspnea, cough, wheezing, hemoptysis  CV: No chest pain, PND, orthopnea  GI: No abdominal pain, diarrhea, constipation, nausea, vomiting, melena, hematochezia  : No increased frequency, dysuria, hematuria, nocturia  MSK: No joint pain/swelling; no back pain; no edema  Neuro: No dizziness/lightheadedness, weakness, seizures, numbness, tingling  Heme: No easy bruising or bleeding  Endo: No heat/cold intolerance  Psych: No significant nervousness, anxiety, stress, depression    All other systems were reviewed and are negative, except as noted.    VITALS/PHYSICAL EXAM  --------------------------------------------------------------------------------  T(C): 36.7 (11-29-23 @ 11:00), Max: 37.3 (11-28-23 @ 15:00)  HR: 68 (11-29-23 @ 14:00) (67 - 82)  BP: 89/51 (11-29-23 @ 11:00) (89/51 - 89/51)  RR: 24 (11-29-23 @ 14:00) (13 - 26)  SpO2: 97% (11-29-23 @ 14:00) (95% - 98%)  Wt(kg): --        11-28-23 @ 07:01  -  11-29-23 @ 07:00  --------------------------------------------------------  IN: 652 mL / OUT: 825 mL / NET: -173 mL    11-29-23 @ 07:01  -  11-29-23 @ 14:37  --------------------------------------------------------  IN: 1058 mL / OUT: 0 mL / NET: 1058 mL      Physical Exam:  	Gen: NAD, well-appearing  	HEENT: PERRL, supple neck, clear oropharynx  	Pulm: CTA B/L  	CV: RRR, S1S2; no rub  	Back: No spinal or CVA tenderness; no sacral edema  	Abd: +BS, soft, nontender/nondistended  	: No suprapubic tenderness  	UE: Warm, FROM, no clubbing, intact strength; no edema; no asterixis  	LE: Warm, FROM, no clubbing, intact strength; no edema  	Neuro: No focal deficits, intact gait  	Psych: Normal affect and mood  	Skin: Warm, without rashes  	Vascular access:    LABS/STUDIES  --------------------------------------------------------------------------------              12.1   12.85 >-----------<  184      [11-29-23 @ 05:37]              36.3     127  |  96  |  56  ----------------------------<  145      [11-29-23 @ 12:42]  4.5   |  18  |  1.64        Ca     9.4     [11-29-23 @ 12:42]      Mg     2.2     [11-29-23 @ 12:42]      Phos  3.3     [11-29-23 @ 12:42]    TPro  6.9  /  Alb  3.4  /  TBili  0.5  /  DBili  x   /  AST  16  /  ALT  29  /  AlkPhos  68  [11-29-23 @ 12:42]    PT/INR: PT 13.0 , INR 1.19       [11-28-23 @ 02:25]  PTT: 74.3       [11-29-23 @ 05:37]      Creatinine Trend:  SCr 1.64 [11-29 @ 12:42]  SCr 1.96 [11-29 @ 05:37]  SCr 1.52 [11-28 @ 02:25]  SCr 1.50 [11-27 @ 15:08]  SCr 1.91 [11-27 @ 03:09]    Urinalysis - [11-29-23 @ 12:42]      Color  / Appearance  / SG  / pH       Gluc 145 / Ketone   / Bili  / Urobili        Blood  / Protein  / Leuk Est  / Nitrite       RBC  / WBC  / Hyaline  / Gran  / Sq Epi  / Non Sq Epi  / Bacteria     Urine Sodium 13      [11-28-23 @ 16:58]  Urine Potassium 65      [11-28-23 @ 16:58]  Urine Osmolality 704      [11-28-23 @ 16:58]    Iron 46, TIBC 234, %sat 20      [11-10-23 @ 17:25]  Ferritin 1646      [11-10-23 @ 17:29]  TSH 0.38      [11-10-23 @ 17:29]  Lipid: chol 130, TG 95, HDL 37, LDL --      [11-10-23 @ 17:25]    HBsAb Nonreact      [11-10-23 @ 17:29]  HBcAb Nonreact      [11-10-23 @ 17:29]  HCV 0.09, Nonreact      [11-10-23 @ 17:29]    BETZAIDA: titer Negative, pattern --      [11-10-23 @ 17:29]  C3 Complement 171      [11-19-23 @ 02:54]  C4 Complement 45      [11-19-23 @ 02:54]  Rheumatoid Factor 13      [11-10-23 @ 17:25]  ANCA: cANCA Negative, pANCA Negative, atypical ANCA Negative      [11-21-23 @ 03:50]  anti-GBM <0.2      [11-19-23 @ 02:54]  Syphilis Screen (Treponema Pallidum Ab) Negative      [11-10-23 @ 17:29]  PLA2R: JACEK <1.8, IFA --      [11-19-23 @ 02:54]  Free Light Chains: kappa 3.76, lambda 3.25, ratio = 1.16      [11-19 @ 02:54]  Immunofixation Serum:   No Monoclonal Band Identified      Reference Range: None Detected      [11-19-23 @ 02:54]  SPEP Interpretation: Normal Electrophoresis Pattern      [11-10-23 @ 17:29]  Immunofixation Urine: No Monoclonal Band Identified      Reference Range: None Detected      [11-12-23 @ 05:03]  UPEP Interpretation: Normal Electrophoresis Pattern      [11-12-23 @ 05:03]   [facility] DIVISION OF KIDNEY DISEASES AND HYPERTENSION -- FOLLOW UP NOTE  --------------------------------------------------------------------------------  Chief Complaint:    24 hour events/subjective:  Pt was seen and examined at the bedside. No acute overnight events. No fresh complaints. had good bowel movements.  Pt denies SOB/ Constipation/ Diarrhea/ Nausea/ Vomiting/ abdominal pain/ chest pain/ tingling/ numbness.         PAST HISTORY  --------------------------------------------------------------------------------  No significant changes to PMH, PSH, FHx, SHx, unless otherwise noted    ALLERGIES & MEDICATIONS  --------------------------------------------------------------------------------  Allergies    penicillins (Unknown)    Intolerances      Standing Inpatient Medications  aMIOdarone    Tablet   Oral   aMIOdarone    Tablet 200 milliGRAM(s) Oral daily  aspirin  chewable 81 milliGRAM(s) Oral daily  atorvastatin 80 milliGRAM(s) Oral at bedtime  budesonide  80 MICROgram(s)/formoterol 4.5 MICROgram(s) Inhaler 2 Puff(s) Inhalation two times a day  carvedilol 25 milliGRAM(s) Oral every 12 hours  chlorhexidine 2% Cloths 1 Application(s) Topical daily  heparin  Infusion 1300 Unit(s)/Hr IV Continuous <Continuous>  hydrALAZINE 100 milliGRAM(s) Oral three times a day  insulin glargine Injectable (LANTUS) 12 Unit(s) SubCutaneous at bedtime  insulin lispro (ADMELOG) corrective regimen sliding scale   SubCutaneous three times a day before meals  insulin lispro (ADMELOG) corrective regimen sliding scale   SubCutaneous at bedtime  insulin lispro Injectable (ADMELOG) 10 Unit(s) SubCutaneous three times a day before meals  isosorbide   dinitrate Tablet (ISORDIL) 40 milliGRAM(s) Oral three times a day  polyethylene glycol 3350 17 Gram(s) Oral two times a day  senna 2 Tablet(s) Oral at bedtime  urea Oral Powder 15 Gram(s) Oral two times a day  zoster Vaccine recombinant (SHINGRIX) 0.5 milliLiter(s) IntraMuscular once    PRN Inpatient Medications  hydrOXYzine hydrochloride 25 milliGRAM(s) Oral two times a day PRN      REVIEW OF SYSTEMS  --------------------------------------------------------------------------------  As above.     VITALS/PHYSICAL EXAM  --------------------------------------------------------------------------------  T(C): 36.7 (11-29-23 @ 11:00), Max: 37.3 (11-28-23 @ 15:00)  HR: 68 (11-29-23 @ 14:00) (67 - 82)  BP: 89/51 (11-29-23 @ 11:00) (89/51 - 89/51)  RR: 24 (11-29-23 @ 14:00) (13 - 26)  SpO2: 97% (11-29-23 @ 14:00) (95% - 98%)  Wt(kg): --        11-28-23 @ 07:01  -  11-29-23 @ 07:00  --------------------------------------------------------  IN: 652 mL / OUT: 825 mL / NET: -173 mL    11-29-23 @ 07:01  -  11-29-23 @ 14:37  --------------------------------------------------------  IN: 1058 mL / OUT: 0 mL / NET: 1058 mL      Physical Exam:  Gen: NAD, well-appearing  HEENT: PERRL, supple neck, clear oropharynx  Pulm: CTA B/L  CV: S1S2; IABP +  Back: No spinal or CVA tenderness  Abd: +BS, soft, nontender/nondistended  : No suprapubic tenderness   Extremities: no bilateral LE edema noted.   Neuro: No focal deficits  Skin: Interval improvement in the non- blanching rash.   Vascular access: NA    LABS/STUDIES  --------------------------------------------------------------------------------              12.1   12.85 >-----------<  184      [11-29-23 @ 05:37]              36.3     127  |  96  |  56  ----------------------------<  145      [11-29-23 @ 12:42]  4.5   |  18  |  1.64        Ca     9.4     [11-29-23 @ 12:42]      Mg     2.2     [11-29-23 @ 12:42]      Phos  3.3     [11-29-23 @ 12:42]    TPro  6.9  /  Alb  3.4  /  TBili  0.5  /  DBili  x   /  AST  16  /  ALT  29  /  AlkPhos  68  [11-29-23 @ 12:42]    PT/INR: PT 13.0 , INR 1.19       [11-28-23 @ 02:25]  PTT: 74.3       [11-29-23 @ 05:37]      Creatinine Trend:  SCr 1.64 [11-29 @ 12:42]  SCr 1.96 [11-29 @ 05:37]  SCr 1.52 [11-28 @ 02:25]  SCr 1.50 [11-27 @ 15:08]  SCr 1.91 [11-27 @ 03:09]    Urinalysis - [11-29-23 @ 12:42]      Color  / Appearance  / SG  / pH       Gluc 145 / Ketone   / Bili  / Urobili        Blood  / Protein  / Leuk Est  / Nitrite       RBC  / WBC  / Hyaline  / Gran  / Sq Epi  / Non Sq Epi  / Bacteria     Urine Sodium 13      [11-28-23 @ 16:58]  Urine Potassium 65      [11-28-23 @ 16:58]  Urine Osmolality 704      [11-28-23 @ 16:58]    Iron 46, TIBC 234, %sat 20      [11-10-23 @ 17:25]  Ferritin 1646      [11-10-23 @ 17:29]  TSH 0.38      [11-10-23 @ 17:29]  Lipid: chol 130, TG 95, HDL 37, LDL --      [11-10-23 @ 17:25]    HBsAb Nonreact      [11-10-23 @ 17:29]  HBcAb Nonreact      [11-10-23 @ 17:29]  HCV 0.09, Nonreact      [11-10-23 @ 17:29]    BETZAIDA: titer Negative, pattern --      [11-10-23 @ 17:29]  C3 Complement 171      [11-19-23 @ 02:54]  C4 Complement 45      [11-19-23 @ 02:54]  Rheumatoid Factor 13      [11-10-23 @ 17:25]  ANCA: cANCA Negative, pANCA Negative, atypical ANCA Negative      [11-21-23 @ 03:50]  anti-GBM <0.2      [11-19-23 @ 02:54]  Syphilis Screen (Treponema Pallidum Ab) Negative      [11-10-23 @ 17:29]  PLA2R: JACEK <1.8, IFA --      [11-19-23 @ 02:54]  Free Light Chains: kappa 3.76, lambda 3.25, ratio = 1.16      [11-19 @ 02:54]  Immunofixation Serum:   No Monoclonal Band Identified      Reference Range: None Detected      [11-19-23 @ 02:54]  SPEP Interpretation: Normal Electrophoresis Pattern      [11-10-23 @ 17:29]  Immunofixation Urine: No Monoclonal Band Identified      Reference Range: None Detected      [11-12-23 @ 05:03]  UPEP Interpretation: Normal Electrophoresis Pattern      [11-12-23 @ 05:03]

## 2023-11-30 LAB
ALBUMIN SERPL ELPH-MCNC: 3.6 G/DL — SIGNIFICANT CHANGE UP (ref 3.3–5)
ALBUMIN SERPL ELPH-MCNC: 3.6 G/DL — SIGNIFICANT CHANGE UP (ref 3.3–5)
ALP SERPL-CCNC: 66 U/L — SIGNIFICANT CHANGE UP (ref 40–120)
ALP SERPL-CCNC: 66 U/L — SIGNIFICANT CHANGE UP (ref 40–120)
ALT FLD-CCNC: 25 U/L — SIGNIFICANT CHANGE UP (ref 10–45)
ALT FLD-CCNC: 25 U/L — SIGNIFICANT CHANGE UP (ref 10–45)
ANION GAP SERPL CALC-SCNC: 13 MMOL/L — SIGNIFICANT CHANGE UP (ref 5–17)
ANION GAP SERPL CALC-SCNC: 13 MMOL/L — SIGNIFICANT CHANGE UP (ref 5–17)
APTT BLD: 75.6 SEC — HIGH (ref 24.5–35.6)
APTT BLD: 75.6 SEC — HIGH (ref 24.5–35.6)
AST SERPL-CCNC: 16 U/L — SIGNIFICANT CHANGE UP (ref 10–40)
AST SERPL-CCNC: 16 U/L — SIGNIFICANT CHANGE UP (ref 10–40)
BASOPHILS # BLD AUTO: 0.03 K/UL — SIGNIFICANT CHANGE UP (ref 0–0.2)
BASOPHILS # BLD AUTO: 0.03 K/UL — SIGNIFICANT CHANGE UP (ref 0–0.2)
BASOPHILS NFR BLD AUTO: 0.4 % — SIGNIFICANT CHANGE UP (ref 0–2)
BASOPHILS NFR BLD AUTO: 0.4 % — SIGNIFICANT CHANGE UP (ref 0–2)
BILIRUB SERPL-MCNC: 0.4 MG/DL — SIGNIFICANT CHANGE UP (ref 0.2–1.2)
BILIRUB SERPL-MCNC: 0.4 MG/DL — SIGNIFICANT CHANGE UP (ref 0.2–1.2)
BUN SERPL-MCNC: 66 MG/DL — HIGH (ref 7–23)
BUN SERPL-MCNC: 66 MG/DL — HIGH (ref 7–23)
CALCIUM SERPL-MCNC: 9.3 MG/DL — SIGNIFICANT CHANGE UP (ref 8.4–10.5)
CALCIUM SERPL-MCNC: 9.3 MG/DL — SIGNIFICANT CHANGE UP (ref 8.4–10.5)
CHLORIDE SERPL-SCNC: 99 MMOL/L — SIGNIFICANT CHANGE UP (ref 96–108)
CHLORIDE SERPL-SCNC: 99 MMOL/L — SIGNIFICANT CHANGE UP (ref 96–108)
CO2 SERPL-SCNC: 17 MMOL/L — LOW (ref 22–31)
CO2 SERPL-SCNC: 17 MMOL/L — LOW (ref 22–31)
CREAT SERPL-MCNC: 1.56 MG/DL — HIGH (ref 0.5–1.3)
CREAT SERPL-MCNC: 1.56 MG/DL — HIGH (ref 0.5–1.3)
EGFR: 51 ML/MIN/1.73M2 — LOW
EGFR: 51 ML/MIN/1.73M2 — LOW
EOSINOPHIL # BLD AUTO: 0.47 K/UL — SIGNIFICANT CHANGE UP (ref 0–0.5)
EOSINOPHIL # BLD AUTO: 0.47 K/UL — SIGNIFICANT CHANGE UP (ref 0–0.5)
EOSINOPHIL NFR BLD AUTO: 5.7 % — SIGNIFICANT CHANGE UP (ref 0–6)
EOSINOPHIL NFR BLD AUTO: 5.7 % — SIGNIFICANT CHANGE UP (ref 0–6)
GLUCOSE BLDC GLUCOMTR-MCNC: 107 MG/DL — HIGH (ref 70–99)
GLUCOSE BLDC GLUCOMTR-MCNC: 107 MG/DL — HIGH (ref 70–99)
GLUCOSE BLDC GLUCOMTR-MCNC: 129 MG/DL — HIGH (ref 70–99)
GLUCOSE BLDC GLUCOMTR-MCNC: 129 MG/DL — HIGH (ref 70–99)
GLUCOSE BLDC GLUCOMTR-MCNC: 146 MG/DL — HIGH (ref 70–99)
GLUCOSE BLDC GLUCOMTR-MCNC: 146 MG/DL — HIGH (ref 70–99)
GLUCOSE BLDC GLUCOMTR-MCNC: 92 MG/DL — SIGNIFICANT CHANGE UP (ref 70–99)
GLUCOSE BLDC GLUCOMTR-MCNC: 92 MG/DL — SIGNIFICANT CHANGE UP (ref 70–99)
GLUCOSE SERPL-MCNC: 103 MG/DL — HIGH (ref 70–99)
GLUCOSE SERPL-MCNC: 103 MG/DL — HIGH (ref 70–99)
HCT VFR BLD CALC: 32.9 % — LOW (ref 39–50)
HCT VFR BLD CALC: 32.9 % — LOW (ref 39–50)
HGB BLD-MCNC: 11 G/DL — LOW (ref 13–17)
HGB BLD-MCNC: 11 G/DL — LOW (ref 13–17)
IMM GRANULOCYTES NFR BLD AUTO: 0.2 % — SIGNIFICANT CHANGE UP (ref 0–0.9)
IMM GRANULOCYTES NFR BLD AUTO: 0.2 % — SIGNIFICANT CHANGE UP (ref 0–0.9)
INR BLD: 1.22 RATIO — HIGH (ref 0.85–1.18)
INR BLD: 1.22 RATIO — HIGH (ref 0.85–1.18)
LYMPHOCYTES # BLD AUTO: 1.51 K/UL — SIGNIFICANT CHANGE UP (ref 1–3.3)
LYMPHOCYTES # BLD AUTO: 1.51 K/UL — SIGNIFICANT CHANGE UP (ref 1–3.3)
LYMPHOCYTES # BLD AUTO: 18.2 % — SIGNIFICANT CHANGE UP (ref 13–44)
LYMPHOCYTES # BLD AUTO: 18.2 % — SIGNIFICANT CHANGE UP (ref 13–44)
MAGNESIUM SERPL-MCNC: 2.1 MG/DL — SIGNIFICANT CHANGE UP (ref 1.6–2.6)
MAGNESIUM SERPL-MCNC: 2.1 MG/DL — SIGNIFICANT CHANGE UP (ref 1.6–2.6)
MCHC RBC-ENTMCNC: 29.6 PG — SIGNIFICANT CHANGE UP (ref 27–34)
MCHC RBC-ENTMCNC: 29.6 PG — SIGNIFICANT CHANGE UP (ref 27–34)
MCHC RBC-ENTMCNC: 33.4 GM/DL — SIGNIFICANT CHANGE UP (ref 32–36)
MCHC RBC-ENTMCNC: 33.4 GM/DL — SIGNIFICANT CHANGE UP (ref 32–36)
MCV RBC AUTO: 88.4 FL — SIGNIFICANT CHANGE UP (ref 80–100)
MCV RBC AUTO: 88.4 FL — SIGNIFICANT CHANGE UP (ref 80–100)
MONOCYTES # BLD AUTO: 0.65 K/UL — SIGNIFICANT CHANGE UP (ref 0–0.9)
MONOCYTES # BLD AUTO: 0.65 K/UL — SIGNIFICANT CHANGE UP (ref 0–0.9)
MONOCYTES NFR BLD AUTO: 7.8 % — SIGNIFICANT CHANGE UP (ref 2–14)
MONOCYTES NFR BLD AUTO: 7.8 % — SIGNIFICANT CHANGE UP (ref 2–14)
NEUTROPHILS # BLD AUTO: 5.63 K/UL — SIGNIFICANT CHANGE UP (ref 1.8–7.4)
NEUTROPHILS # BLD AUTO: 5.63 K/UL — SIGNIFICANT CHANGE UP (ref 1.8–7.4)
NEUTROPHILS NFR BLD AUTO: 67.7 % — SIGNIFICANT CHANGE UP (ref 43–77)
NEUTROPHILS NFR BLD AUTO: 67.7 % — SIGNIFICANT CHANGE UP (ref 43–77)
NRBC # BLD: 0 /100 WBCS — SIGNIFICANT CHANGE UP (ref 0–0)
NRBC # BLD: 0 /100 WBCS — SIGNIFICANT CHANGE UP (ref 0–0)
PHOSPHATE SERPL-MCNC: 2.7 MG/DL — SIGNIFICANT CHANGE UP (ref 2.5–4.5)
PHOSPHATE SERPL-MCNC: 2.7 MG/DL — SIGNIFICANT CHANGE UP (ref 2.5–4.5)
PLATELET # BLD AUTO: 151 K/UL — SIGNIFICANT CHANGE UP (ref 150–400)
PLATELET # BLD AUTO: 151 K/UL — SIGNIFICANT CHANGE UP (ref 150–400)
POTASSIUM SERPL-MCNC: 4.2 MMOL/L — SIGNIFICANT CHANGE UP (ref 3.5–5.3)
POTASSIUM SERPL-MCNC: 4.2 MMOL/L — SIGNIFICANT CHANGE UP (ref 3.5–5.3)
POTASSIUM SERPL-SCNC: 4.2 MMOL/L — SIGNIFICANT CHANGE UP (ref 3.5–5.3)
POTASSIUM SERPL-SCNC: 4.2 MMOL/L — SIGNIFICANT CHANGE UP (ref 3.5–5.3)
PROT SERPL-MCNC: 6.7 G/DL — SIGNIFICANT CHANGE UP (ref 6–8.3)
PROT SERPL-MCNC: 6.7 G/DL — SIGNIFICANT CHANGE UP (ref 6–8.3)
PROTHROM AB SERPL-ACNC: 13.3 SEC — HIGH (ref 9.5–13)
PROTHROM AB SERPL-ACNC: 13.3 SEC — HIGH (ref 9.5–13)
RBC # BLD: 3.72 M/UL — LOW (ref 4.2–5.8)
RBC # BLD: 3.72 M/UL — LOW (ref 4.2–5.8)
RBC # FLD: 15 % — HIGH (ref 10.3–14.5)
RBC # FLD: 15 % — HIGH (ref 10.3–14.5)
SODIUM SERPL-SCNC: 129 MMOL/L — LOW (ref 135–145)
SODIUM SERPL-SCNC: 129 MMOL/L — LOW (ref 135–145)
WBC # BLD: 8.31 K/UL — SIGNIFICANT CHANGE UP (ref 3.8–10.5)
WBC # BLD: 8.31 K/UL — SIGNIFICANT CHANGE UP (ref 3.8–10.5)
WBC # FLD AUTO: 8.31 K/UL — SIGNIFICANT CHANGE UP (ref 3.8–10.5)
WBC # FLD AUTO: 8.31 K/UL — SIGNIFICANT CHANGE UP (ref 3.8–10.5)

## 2023-11-30 PROCEDURE — 99291 CRITICAL CARE FIRST HOUR: CPT | Mod: 25

## 2023-11-30 PROCEDURE — 93010 ELECTROCARDIOGRAM REPORT: CPT

## 2023-11-30 PROCEDURE — 71045 X-RAY EXAM CHEST 1 VIEW: CPT | Mod: 26

## 2023-11-30 PROCEDURE — 99232 SBSQ HOSP IP/OBS MODERATE 35: CPT

## 2023-11-30 PROCEDURE — 90837 PSYTX W PT 60 MINUTES: CPT

## 2023-11-30 PROCEDURE — 99232 SBSQ HOSP IP/OBS MODERATE 35: CPT | Mod: GC

## 2023-11-30 PROCEDURE — 99292 CRITICAL CARE ADDL 30 MIN: CPT

## 2023-11-30 RX ADMIN — BUDESONIDE AND FORMOTEROL FUMARATE DIHYDRATE 2 PUFF(S): 160; 4.5 AEROSOL RESPIRATORY (INHALATION) at 20:32

## 2023-11-30 RX ADMIN — HEPARIN SODIUM 13 UNIT(S)/HR: 5000 INJECTION INTRAVENOUS; SUBCUTANEOUS at 05:49

## 2023-11-30 RX ADMIN — Medication 0: at 08:35

## 2023-11-30 RX ADMIN — ATORVASTATIN CALCIUM 80 MILLIGRAM(S): 80 TABLET, FILM COATED ORAL at 21:20

## 2023-11-30 RX ADMIN — Medication 100 MILLIGRAM(S): at 09:44

## 2023-11-30 RX ADMIN — INSULIN GLARGINE 12 UNIT(S): 100 INJECTION, SOLUTION SUBCUTANEOUS at 21:45

## 2023-11-30 RX ADMIN — Medication 9 UNIT(S): at 12:35

## 2023-11-30 RX ADMIN — Medication 9 UNIT(S): at 17:50

## 2023-11-30 RX ADMIN — CHLORHEXIDINE GLUCONATE 1 APPLICATION(S): 213 SOLUTION TOPICAL at 21:55

## 2023-11-30 RX ADMIN — Medication 0: at 12:35

## 2023-11-30 RX ADMIN — AMIODARONE HYDROCHLORIDE 200 MILLIGRAM(S): 400 TABLET ORAL at 05:48

## 2023-11-30 RX ADMIN — Medication 9 UNIT(S): at 08:36

## 2023-11-30 RX ADMIN — ISOSORBIDE DINITRATE 40 MILLIGRAM(S): 5 TABLET ORAL at 16:56

## 2023-11-30 RX ADMIN — CHLORHEXIDINE GLUCONATE 1 APPLICATION(S): 213 SOLUTION TOPICAL at 00:06

## 2023-11-30 RX ADMIN — Medication 15 GRAM(S): at 08:37

## 2023-11-30 RX ADMIN — Medication 81 MILLIGRAM(S): at 12:35

## 2023-11-30 RX ADMIN — CARVEDILOL PHOSPHATE 25 MILLIGRAM(S): 80 CAPSULE, EXTENDED RELEASE ORAL at 05:48

## 2023-11-30 RX ADMIN — ISOSORBIDE DINITRATE 40 MILLIGRAM(S): 5 TABLET ORAL at 12:34

## 2023-11-30 RX ADMIN — Medication 100 MILLIGRAM(S): at 16:19

## 2023-11-30 RX ADMIN — CARVEDILOL PHOSPHATE 25 MILLIGRAM(S): 80 CAPSULE, EXTENDED RELEASE ORAL at 17:50

## 2023-11-30 RX ADMIN — BUDESONIDE AND FORMOTEROL FUMARATE DIHYDRATE 2 PUFF(S): 160; 4.5 AEROSOL RESPIRATORY (INHALATION) at 08:24

## 2023-11-30 RX ADMIN — ISOSORBIDE DINITRATE 40 MILLIGRAM(S): 5 TABLET ORAL at 05:48

## 2023-11-30 NOTE — PROGRESS NOTE ADULT - SUBJECTIVE AND OBJECTIVE BOX
INFECTIOUS DISEASES FOLLOW UP-- Courtney Peters  [phone]    This is a follow up note for this  58yMale with  Non-ST elevation myocardial infarction (NSTEMI)        ROS:  CONSTITUTIONAL:  No fever, good appetite  CARDIOVASCULAR:  No chest pain or palpitations  RESPIRATORY:  No dyspnea  GASTROINTESTINAL:  No nausea, vomiting, diarrhea, or abdominal pain  GENITOURINARY:  No dysuria  NEUROLOGIC:  No headache,     Allergies    penicillins (Unknown)    Intolerances        ANTIBIOTICS/RELEVANT:  antimicrobials    immunologic:  zoster Vaccine recombinant (SHINGRIX) 0.5 milliLiter(s) IntraMuscular once    OTHER:  aMIOdarone    Tablet   Oral   aMIOdarone    Tablet 200 milliGRAM(s) Oral daily  aspirin  chewable 81 milliGRAM(s) Oral daily  atorvastatin 80 milliGRAM(s) Oral at bedtime  budesonide  80 MICROgram(s)/formoterol 4.5 MICROgram(s) Inhaler 2 Puff(s) Inhalation two times a day  carvedilol 25 milliGRAM(s) Oral every 12 hours  chlorhexidine 2% Cloths 1 Application(s) Topical daily  heparin  Infusion 1300 Unit(s)/Hr IV Continuous <Continuous>  hydrALAZINE 100 milliGRAM(s) Oral three times a day  hydrOXYzine hydrochloride 25 milliGRAM(s) Oral two times a day PRN  insulin glargine Injectable (LANTUS) 12 Unit(s) SubCutaneous at bedtime  insulin lispro (ADMELOG) corrective regimen sliding scale   SubCutaneous at bedtime  insulin lispro (ADMELOG) corrective regimen sliding scale   SubCutaneous three times a day before meals  insulin lispro Injectable (ADMELOG) 9 Unit(s) SubCutaneous three times a day before meals  isosorbide   dinitrate Tablet (ISORDIL) 40 milliGRAM(s) Oral three times a day  polyethylene glycol 3350 17 Gram(s) Oral two times a day  senna 2 Tablet(s) Oral at bedtime      Objective:  Vital Signs Last 24 Hrs  T(C): 36.8 (30 Nov 2023 14:00), Max: 36.8 (30 Nov 2023 04:00)  T(F): 98.3 (30 Nov 2023 14:00), Max: 98.3 (30 Nov 2023 04:00)  HR: 79 (30 Nov 2023 15:00) (66 - 80)  BP: --  BP(mean): --  RR: 26 (30 Nov 2023 15:00) (14 - 35)  SpO2: 97% (30 Nov 2023 15:00) (94% - 98%)    Parameters below as of 30 Nov 2023 15:00  Patient On (Oxygen Delivery Method): room air        PHYSICAL EXAM:  Constitutional:no acute distress  Eyes:MARVEL, EOMI  Ear/Nose/Throat: no oral lesions, 	  Respiratory: clear BL  Cardiovascular: S1S2  Gastrointestinal:soft, (+) BS, no tenderness  Extremities:no e/e/c  No Lymphadenopathy  IV sites not inflammed.    LABS:                        11.0   8.31  )-----------( 151      ( 30 Nov 2023 00:39 )             32.9     11-30    129<L>  |  99  |  66<H>  ----------------------------<  103<H>  4.2   |  17<L>  |  1.56<H>    Ca    9.3      30 Nov 2023 00:39  Phos  2.7     11-30  Mg     2.1     11-30    TPro  6.7  /  Alb  3.6  /  TBili  0.4  /  DBili  x   /  AST  16  /  ALT  25  /  AlkPhos  66  11-30    PT/INR - ( 30 Nov 2023 00:39 )   PT: 13.3 sec;   INR: 1.22 ratio         PTT - ( 30 Nov 2023 00:39 )  PTT:75.6 sec  Urinalysis Basic - ( 30 Nov 2023 00:39 )    Color: x / Appearance: x / SG: x / pH: x  Gluc: 103 mg/dL / Ketone: x  / Bili: x / Urobili: x   Blood: x / Protein: x / Nitrite: x   Leuk Esterase: x / RBC: x / WBC x   Sq Epi: x / Non Sq Epi: x / Bacteria: x        MICROBIOLOGY:    Urinalysis + Microscopic Examination (11.29.23 @ 15:43)    pH Urine: 5.5   Urine Appearance: Clear   Color: Yellow   Specific Gravity: 1.022   Protein, Urine: Negative mg/dL   Glucose Qualitative, Urine: 100 mg/dL   Ketone - Urine: Negative mg/dL   Blood, Urine: Negative   Bilirubin: Negative   Urobilinogen: 0.2 mg/dL   Leukocyte Esterase Concentration: Negative   Nitrite: Negative   Review: Reviewed   White Blood Cell - Urine: 0 /HPF   Red Blood Cell - Urine: 1 /HPF   Bacteria: Negative /HPF   Cast: 7 /LPF   Hyaline Casts: Present   Fine Granular Casts: Present   Epithelial Cells: 1 /HPF            RECENT CULTURES:      RADIOLOGY & ADDITIONAL STUDIES:    < from: Xray Chest 1 View- PORTABLE-Routine (Xray Chest 1 View- PORTABLE-Routine in AM.) (11.30.23 @ 04:55) >  COMPARISON STUDY: 11/29/2023    Frontal expiratory view of the chest shows the heart to be similar in   size. IABP marker is in similar position reaching the lower aortic knob.    The lungs show mild lower left lung atelectasis and there is no evidence   of pneumothorax nor pleural effusion.    IMPRESSION:  Mild atelectasis. Marker as noted.    < end of copied text >   INFECTIOUS DISEASES FOLLOW UP-- Courtney Peters  [phone]    This is a follow up note for this  58yMale with  Non-ST elevation myocardial infarction (NSTEMI)    no interval events        ROS:  CONSTITUTIONAL:  No fever, good appetite  CARDIOVASCULAR:  No chest pain or palpitations  RESPIRATORY:  No dyspnea  GASTROINTESTINAL:  No nausea, vomiting, diarrhea, or abdominal pain  GENITOURINARY:  No dysuria  NEUROLOGIC:  No headache,     Allergies    penicillins (Unknown)    Intolerances        ANTIBIOTICS/RELEVANT:  antimicrobials    immunologic:  zoster Vaccine recombinant (SHINGRIX) 0.5 milliLiter(s) IntraMuscular once    OTHER:  aMIOdarone    Tablet   Oral   aMIOdarone    Tablet 200 milliGRAM(s) Oral daily  aspirin  chewable 81 milliGRAM(s) Oral daily  atorvastatin 80 milliGRAM(s) Oral at bedtime  budesonide  80 MICROgram(s)/formoterol 4.5 MICROgram(s) Inhaler 2 Puff(s) Inhalation two times a day  carvedilol 25 milliGRAM(s) Oral every 12 hours  chlorhexidine 2% Cloths 1 Application(s) Topical daily  heparin  Infusion 1300 Unit(s)/Hr IV Continuous <Continuous>  hydrALAZINE 100 milliGRAM(s) Oral three times a day  hydrOXYzine hydrochloride 25 milliGRAM(s) Oral two times a day PRN  insulin glargine Injectable (LANTUS) 12 Unit(s) SubCutaneous at bedtime  insulin lispro (ADMELOG) corrective regimen sliding scale   SubCutaneous at bedtime  insulin lispro (ADMELOG) corrective regimen sliding scale   SubCutaneous three times a day before meals  insulin lispro Injectable (ADMELOG) 9 Unit(s) SubCutaneous three times a day before meals  isosorbide   dinitrate Tablet (ISORDIL) 40 milliGRAM(s) Oral three times a day  polyethylene glycol 3350 17 Gram(s) Oral two times a day  senna 2 Tablet(s) Oral at bedtime      Objective:  Vital Signs Last 24 Hrs  T(C): 36.8 (30 Nov 2023 14:00), Max: 36.8 (30 Nov 2023 04:00)  T(F): 98.3 (30 Nov 2023 14:00), Max: 98.3 (30 Nov 2023 04:00)  HR: 79 (30 Nov 2023 15:00) (66 - 80)  BP: --  BP(mean): --  RR: 26 (30 Nov 2023 15:00) (14 - 35)  SpO2: 97% (30 Nov 2023 15:00) (94% - 98%)    Parameters below as of 30 Nov 2023 15:00  Patient On (Oxygen Delivery Method): room air        PHYSICAL EXAM:  Constitutional:no acute distress  Eyes:MARVEL, EOMI  Ear/Nose/Throat: no oral lesions, 	  Respiratory: clear BL  Cardiovascular: S1S2  Gastrointestinal:soft, (+) BS, no tenderness  Extremities:no e/e/c IABP site CDI  No Lymphadenopathy  IV sites not inflammed.    LABS:                        11.0   8.31  )-----------( 151      ( 30 Nov 2023 00:39 )             32.9     11-30    129<L>  |  99  |  66<H>  ----------------------------<  103<H>  4.2   |  17<L>  |  1.56<H>    Ca    9.3      30 Nov 2023 00:39  Phos  2.7     11-30  Mg     2.1     11-30    TPro  6.7  /  Alb  3.6  /  TBili  0.4  /  DBili  x   /  AST  16  /  ALT  25  /  AlkPhos  66  11-30    PT/INR - ( 30 Nov 2023 00:39 )   PT: 13.3 sec;   INR: 1.22 ratio         PTT - ( 30 Nov 2023 00:39 )  PTT:75.6 sec  Urinalysis Basic - ( 30 Nov 2023 00:39 )    Color: x / Appearance: x / SG: x / pH: x  Gluc: 103 mg/dL / Ketone: x  / Bili: x / Urobili: x   Blood: x / Protein: x / Nitrite: x   Leuk Esterase: x / RBC: x / WBC x   Sq Epi: x / Non Sq Epi: x / Bacteria: x        MICROBIOLOGY:    Urinalysis + Microscopic Examination (11.29.23 @ 15:43)    pH Urine: 5.5   Urine Appearance: Clear   Color: Yellow   Specific Gravity: 1.022   Protein, Urine: Negative mg/dL   Glucose Qualitative, Urine: 100 mg/dL   Ketone - Urine: Negative mg/dL   Blood, Urine: Negative   Bilirubin: Negative   Urobilinogen: 0.2 mg/dL   Leukocyte Esterase Concentration: Negative   Nitrite: Negative   Review: Reviewed   White Blood Cell - Urine: 0 /HPF   Red Blood Cell - Urine: 1 /HPF   Bacteria: Negative /HPF   Cast: 7 /LPF   Hyaline Casts: Present   Fine Granular Casts: Present   Epithelial Cells: 1 /HPF            RECENT CULTURES:      RADIOLOGY & ADDITIONAL STUDIES:    < from: Xray Chest 1 View- PORTABLE-Routine (Xray Chest 1 View- PORTABLE-Routine in AM.) (11.30.23 @ 04:55) >  COMPARISON STUDY: 11/29/2023    Frontal expiratory view of the chest shows the heart to be similar in   size. IABP marker is in similar position reaching the lower aortic knob.    The lungs show mild lower left lung atelectasis and there is no evidence   of pneumothorax nor pleural effusion.    IMPRESSION:  Mild atelectasis. Marker as noted.    < end of copied text >

## 2023-11-30 NOTE — PROGRESS NOTE ADULT - ASSESSMENT
58 yrs old male w/ hx of HFrEF (LVIDd 6.4 cm, LVEF 32%), CAD s/p PCI (2008), HTN, DMT2 (A1c 8.3%) and CVA s/p TPA (2018), recently treated for PNA who initially presented to [facility] via EMS after syncope reportedly requiring defibrilation. Treated for ACS but left AMA to come to [facility]. Pt has covid and was placed on IABP for hypotension. Now being evaluated for Heart transplant Vs LVAD placement. Nephrology consulted for [facility].

## 2023-11-30 NOTE — PROGRESS NOTE ADULT - PROBLEM SELECTOR PLAN 2
Hyponatremia: developed during this hospital course. He is not symptomatic. Urine Osm 471 and Urine Na 88.   Given 150cc bolus of 3% saline on 11/24. Na today 127 and pt remained asymptomatic. His nausea has improved and he was on liquid diet. Today he was transitioned to regular diet.     PLAN:  Pt is on Urena and pt's hyponatremia is improving. Today 129 and pt is eating solid diet.   D/c urena   Fluid restriction till the hyponatremia resolves. Target < 1-1.5L a day.   rest of the management as per the primary team.

## 2023-11-30 NOTE — PROGRESS NOTE ADULT - SUBJECTIVE AND OBJECTIVE BOX
LD VALENCIA  58y Male  MRN:[id number]    Patient is a 58y old  Male who presents with a chief complaint of NSTEMI (01 Nov 2023 20:29)    HPI:  59yo M w/ hx HTN, CAD w/ 1 stent in 2009, ICH (2008) presenting with abn ekg. Patient presented to [facility] where he was found to have STEMI, recommended to get cath however patient did not want to get it there so it left and came here.  Patient initially had cough, congestion, fever, was placed on antibiotics on Sunday.  Started feeling nauseous and had a presyncopal event after which he presented to ED last night.  Had chest pain as well.  Chest pain is midsternal.  Not currently having chest pain.  Received 4 aspirin 30 min pta. (01 Nov 2023 15:11)      Patient seen and evaluated at bedside in CCU. interval events noted    Interval HPI: remain in ccu. IABP in place        PAST MEDICAL & SURGICAL HISTORY:  HTN (hypertension)      CAD (coronary artery disease)  2009; stent      Intracranial hemorrhage  2008      Respiratory arrest  december 1st      Myocardial infarction, unspecified MI type, unspecified artery      History of coronary artery stent placement          REVIEW OF SYSTEMS:  as per hpi     VITALS:   ICU Vital Signs Last 24 Hrs  T(C): 36.8 (30 Nov 2023 19:00), Max: 36.8 (30 Nov 2023 04:00)  T(F): 98.2 (30 Nov 2023 19:00), Max: 98.3 (30 Nov 2023 04:00)  HR: 70 (30 Nov 2023 21:00) (64 - 80)  BP: --  BP(mean): --  ABP: --  ABP(mean): --  RR: 17 (30 Nov 2023 21:00) (14 - 26)  SpO2: 95% (30 Nov 2023 21:00) (95% - 98%)    O2 Parameters below as of 30 Nov 2023 21:00  Patient On (Oxygen Delivery Method): room air                PHYSICAL EXAM:  GENERAL: NAD, comfortable   HEAD:  Atraumatic, Normocephalic  EYES: EOMI, PERRLA, conjunctiva and sclera clear  NECK: Supple, No JVD   CHEST/LUNG: Clear to auscultation bilaterally; No wheeze  HEART: Regular rate and rhythm; No murmurs, rubs, or gallops  ABDOMEN: Soft, Nontender, Nondistended; Bowel sounds present  EXTREMITIES:  2+ Peripheral Pulses, No clubbing, cyanosis, or edema  NEUROLOGY: nonfocal  SKIN: No rashes or lesions  +iabp    Consultant(s) Notes Reviewed:  [x ] YES  [ ] NO  Care Discussed with Consultants/Other Providers [ x] YES  [ ] NO    MEDS:   MEDICATIONS  (STANDING):  aMIOdarone    Tablet   Oral   aMIOdarone    Tablet 200 milliGRAM(s) Oral daily  aspirin  chewable 81 milliGRAM(s) Oral daily  atorvastatin 80 milliGRAM(s) Oral at bedtime  budesonide  80 MICROgram(s)/formoterol 4.5 MICROgram(s) Inhaler 2 Puff(s) Inhalation two times a day  carvedilol 25 milliGRAM(s) Oral every 12 hours  chlorhexidine 2% Cloths 1 Application(s) Topical daily  heparin  Infusion 1300 Unit(s)/Hr (13 mL/Hr) IV Continuous <Continuous>  hydrALAZINE 100 milliGRAM(s) Oral three times a day  insulin glargine Injectable (LANTUS) 12 Unit(s) SubCutaneous at bedtime  insulin lispro (ADMELOG) corrective regimen sliding scale   SubCutaneous at bedtime  insulin lispro (ADMELOG) corrective regimen sliding scale   SubCutaneous three times a day before meals  insulin lispro Injectable (ADMELOG) 10 Unit(s) SubCutaneous three times a day before meals  isosorbide   dinitrate Tablet (ISORDIL) 40 milliGRAM(s) Oral three times a day  polyethylene glycol 3350 17 Gram(s) Oral two times a day  senna 2 Tablet(s) Oral at bedtime  urea Oral Powder 15 Gram(s) Oral two times a day  zoster Vaccine recombinant (SHINGRIX) 0.5 milliLiter(s) IntraMuscular once    MEDICATIONS  (PRN):  hydrOXYzine hydrochloride 25 milliGRAM(s) Oral two times a day PRN Anxiety        ALLERGIES:  penicillins (Unknown)      LABS:                                                                            11.0   8.31  )-----------( 151      ( 30 Nov 2023 00:39 )             32.9   11-30    129<L>  |  99  |  66<H>  ----------------------------<  103<H>  4.2   |  17<L>  |  1.56<H>    Ca    9.3      30 Nov 2023 00:39  Phos  2.7     11-30  Mg     2.1     11-30    TPro  6.7  /  Alb  3.6  /  TBili  0.4  /  DBili  x   /  AST  16  /  ALT  25  /  AlkPhos  66  11-30      < from: CT Abdomen and Pelvis No Cont (11.28.23 @ 03:38) >  IMPRESSION:  Mildly dilated colon and prominent but not overly dilated small bowel   with air-fluid levels. No discrete transition point. Findings are   suggestive of ileus.    Intra-aortic balloon pump with the inferior marker at the level of the   inferior mesenteric artery and the balloon overlying the origins of the   renal arteries, celiac artery, and superior mesenteric artery. Consider   repositioning. Concern for IABP positioning was discussed with LANETTE Hawthorne   on 11/28/2023 at 3:42 AM by Dr. Shepard.    < end of copied text >          < from: Colonoscopy (11.17.23 @ 10:35) >                                                                                                        Impression:          - The entire examined colon is normal on direct and retroflexion views.                       - No specimens collected.  Recommendation:      - Resume previous diet today.                       - No large polyps or masses detected, No objection from GI standpoint to                 proceed with heart transplantation/advanced heart therapies.                       - Please call back should any further questions or concerns arise.    < end of copied text >     < from: Xray Chest 1 View- PORTABLE-Urgent (11.01.23 @ 07:42) >    IMPRESSION:  Clear lungs.    ---End of Report ---        < end of copied text >  < from: TTE W or WO Ultrasound Enhancing Agent (11.01.23 @ 10:23) >  _____________________________     CONCLUSIONS:      1. Left ventricular cavity is moderately dilated. Left ventricular wall thickness is normal. Left ventricular systolic function is severely decreased with an ejection fraction of 32 % by Chinchilla's method of disks. Regional wall motion abnormalities present.   2. Multiple segmental abnormalities exist. See findings.   3. There is moderate (grade 2) left ventricular diastolic dysfunction, with indeterminant filling pressure.   4. Normal right ventricular cavity size, wall thickness, and systolic function.   5. No significant valvular disease.   6. No pericardial effusion seen.   7. Compared to the transthoracic echocardiogram performed on 1/25/2017 the areas of akinesis are unchanged but there has been a decline in LV systolic function with new areas of hypokinesis.    __________________________________________________________________    < end of copied text >  < from: TTE Limited W or WO Ultrasound Enhancing Agent (11.02.23 @ 07:41) >  __________________________     CONCLUSIONS:      1. After obtaining consent, Definity ultrasound enhancing agent was given for enhanced left ventricular opacification and improved delineation of the left ventricular endocardial borders. Left ventricular systolic function is severely decreased with a calculated ejection fraction of 22 % by the Chinchilla's biplane method of disks. There is a left ventricular thrombus.   2. Findings were discussed with Litzy BOSS on 11/2/2023 at 8.49am.   3. There is a left ventricular thrombus.    _________________________________________________________________    < end of copied text >

## 2023-11-30 NOTE — PROGRESS NOTE ADULT - SUBJECTIVE AND OBJECTIVE BOX
DEVORAH VALENCIA  MRN-[id number]  Patient is a 58y old  Male who presents with a chief complaint of pre advanced cardiac therapies evaluation (2023 15:39)    HPI:  pt seen and approx 1:30 pm  in [facility]    59yo M w/ hx HTN, CAD w/ 1 stent in , ICH () presenting with abn ekg. Patient presented to [facility] where he was found to have STEMI, recommended to get cath however patient did not want to get it there so it left and came here.  Patient initially had cough, congestion, fever, was placed on antibiotics on .  Started feeling nauseous and had a presyncopal event after which he presented to ED last night.  Had chest pain as well.  Chest pain is midsternal.  Not currently having chest pain.  Received 4 aspirin 30 min pta. (2023 15:11)      Hospital Course:    24 HOUR EVENTS:    REVIEW OF SYSTEMS:    CONSTITUTIONAL: No weakness, fevers or chills  EYES/ENT: No visual changes;  No vertigo or throat pain   NECK: No pain or stiffness  RESPIRATORY: No cough, wheezing, hemoptysis; No shortness of breath  CARDIOVASCULAR: No chest pain or palpitations  GASTROINTESTINAL: No abdominal or epigastric pain. No nausea, vomiting, or hematemesis; No diarrhea or constipation. No melena or hematochezia.  GENITOURINARY: No dysuria, frequency or hematuria  NEUROLOGICAL: No numbness or weakness  SKIN: No itching, rashes      ICU Vital Signs Last 24 Hrs  T(C): 36.8 (2023 19:00), Max: 36.8 (2023 04:00)  T(F): 98.2 (2023 19:00), Max: 98.3 (2023 04:00)  HR: 73 (2023 20:00) (64 - 80)  BP: --  BP(mean): --  ABP: --  ABP(mean): --  RR: 18 (2023 20:00) (14 - 26)  SpO2: 97% (2023 20:00) (95% - 98%)    O2 Parameters below as of 2023 20:00  Patient On (Oxygen Delivery Method): room air            CVP(mm Hg): --  CO: --  CI: --  PA: --  PA(mean): --  PA(direct): --  PCWP: --  LA: --  RA: --  SVR: --  SVRI: --  PVR: --  PVRI: --  I&O's Summary    2023 07:01  -  2023 07:00  --------------------------------------------------------  IN:  mL / OUT: 1200 mL / NET: 812 mL    2023 07:  -  2023 20:23  --------------------------------------------------------  IN: 1049 mL / OUT: 1240 mL / NET: -191 mL        CAPILLARY BLOOD GLUCOSE    CAPILLARY BLOOD GLUCOSE      POCT Blood Glucose.: 129 mg/dL (2023 17:01)      PHYSICAL EXAM:  GENERAL: No acute distress, well-developed  HEAD:  Atraumatic, Normocephalic  EYES: EOMI, PERRLA, conjunctiva and sclera clear  NECK: Supple, no lymphadenopathy, no JVD  CHEST/LUNG: CTAB; No wheezes, rales, or rhonchi  HEART: Regular rate and rhythm. Normal S1/S2. No murmurs, rubs, or gallops  ABDOMEN: Soft, non-tender, non-distended; normal bowel sounds, no organomegaly  EXTREMITIES:  2+ peripheral pulses b/l, No clubbing, cyanosis, or edema  NEUROLOGY: A&O x 3, no focal deficits  SKIN: No rashes or lesions    ============================I/O===========================   I&O's Detail    2023 07:  -  2023 07:00  --------------------------------------------------------  IN:    Heparin: 312 mL    Lactated Ringers Bolus: 500 mL    Oral Fluid: 1200 mL  Total IN: 2012 mL    OUT:    Voided (mL): 1200 mL  Total OUT: 1200 mL    Total NET: 812 mL      2023 07:01  -  2023 20:23  --------------------------------------------------------  IN:    Heparin: 169 mL    Oral Fluid: 880 mL  Total IN: 1049 mL    OUT:    Voided (mL): 1240 mL  Total OUT: 1240 mL    Total NET: -191 mL        ============================ LABS =========================                        11.0   8.31  )-----------( 151      ( 2023 00:39 )             32.9     11-30    129<L>  |  99  |  66<H>  ----------------------------<  103<H>  4.2   |  17<L>  |  1.56<H>    Ca    9.3      2023 00:39  Phos  2.7       Mg     2.1         TPro  6.7  /  Alb  3.6  /  TBili  0.4  /  DBili  x   /  AST  16  /  ALT  25  /  AlkPhos  66  11-30                LIVER FUNCTIONS - ( 2023 00:39 )  Alb: 3.6 g/dL / Pro: 6.7 g/dL / ALK PHOS: 66 U/L / ALT: 25 U/L / AST: 16 U/L / GGT: x           PT/INR - ( 2023 00:39 )   PT: 13.3 sec;   INR: 1.22 ratio         PTT - ( 2023 00:39 )  PTT:75.6 sec    Lactate, Blood: 1.9 mmol/L (23 @ 05:37)    Urinalysis Basic - ( 2023 00:39 )    Color: x / Appearance: x / SG: x / pH: x  Gluc: 103 mg/dL / Ketone: x  / Bili: x / Urobili: x   Blood: x / Protein: x / Nitrite: x   Leuk Esterase: x / RBC: x / WBC x   Sq Epi: x / Non Sq Epi: x / Bacteria: x      ======================Micro/Rad/Cardio=================  Telemtry: Reviewed   EKG: Reviewed  CXR: Reviewed  Culture: Reviewed   Echo:   Cath: Cardiac Cath Lab - Adult:   [facility]  Department of Cardiology  [address] [place] [address]  [phone]  Cath Lab Report -- Comprehensive Report  Patient: LD VALENCIA  Study date: 2017  Account number: [id number]  MR number: [id number]  : 1965  Gender: Male  Race: W  Case Physician(s):  Jairon Holguin M.D.  Referring Physician:  Luc Lynn M.D.  INDICATIONS: Unstable angina - CCS4.  HISTORY: The patient has a history of coronary artery disease. The patient  hashypertension and medication-treated dyslipidemia.  PROCEDURE:  --  Left heart catheterization with ventriculography.  --  Left coronary angiography.  --  Right coronary angiography.  TECHNIQUE: The risks and alternatives of the procedures and conscious  sedation were explained to the patient and informed consent was obtained.  Cardiac catheterization performed electively.  Local anesthetic given. Right radial artery access. A 6FR PRELUDE KIT was  inserted in the vessel. Left heart catheterization. Ventriculography was  performed. A 5FR FR4.0 EXPO catheter was utilized. Left coronary artery  angiography. The vessel was injected utilizing a 5FR FL3.5 EXPO catheter.  Right coronary artery angiography. The vessel was injected utilizing a 5FR  FR4.0 EXPO catheter. RADIATION EXPOSURE: 1.1 min.  CONTRAST GIVEN: Omnipaque 55 ml.  MEDICATIONS GIVEN: Midazolam, 1 mg, IV. Fentanyl, 25 mcg, IV. Verapamil  (Isoptin, Calan, Covera), 2.5 mg, IA. Heparin, 3000 units, IA.  VENTRICLES: Global left ventricular function was moderately depressed. EF  estimated was 40 %.  CORONARY VESSELS: The coronary circulation is right dominant.  LM:   --  LM: Normal.  LAD:   --  Proximal LAD: There was a 50 % stenosis.  CX:   --  Circumflex: Normal.  RCA:   --  Mid RCA: There was a 40 % stenosis.  --  Distal RCA: There was a 50 % stenosis.  COMPLICATIONS: There were no complications.  DIAGNOSTIC RECOMMENDATIONS: The patient should continue with the present  medications.  Prepared and signed by  Jairon Holguin M.D.  Signed 2017 12:20:13  HEMODYNAMIC TABLES  Pressures:  Baseline/ Room Air  Pressures:  - HR: 78  Pressures:  - Rhythm:  Pressures:  -- Aortic Pressure (S/D/M): --/--/99  Pressures:  -- Left Ventricle (s/edp): 157/39/--  Outputs:  Baseline/ Room Air  Outputs:  -- CALCULATIONS[age]  Outputs:  -- CALCULATIONS: Body Surface Area: 2.05  Outputs:  -- CALCULATIONS: Height in cm: 175.00  Outputs:  -- CALCULATIONS: Sex: Male  Outputs:  -- CALCULATIONS: Weight in k.40 (17 @ 21:55)    ======================================================  PAST MEDICAL & SURGICAL HISTORY:  HTN (hypertension)      CAD (coronary artery disease)  ; stent      Intracranial hemorrhage        Respiratory arrest        Myocardial infarction, unspecified MI type, unspecified artery      History of coronary artery stent placement  ====================ASSESSMENT ==============  58 male with HTN, CAD (s/p PCI ), HFrEF, CVA , and T2DM presenting with chest pressure and unknown tachycardia that was shocked x1, [facility]  found to have in-stent restenosis of pLAD and  of RCA with elevated RA and PA pressures and severely decreased EF 32%. Admitted to CICU for management of cardiogenic shock and ADHF requiring IABP  -, with hospital course c/b vfib arrest requiring reinsertion of IABP. Currently undergoing workup for AT evaluation with HF.    Plan:  ====================== NEUROLOGY=====================  Anxiety  - No Seroquel/antipsychotics since vfib arrest and prolonged QTC  - Psych Eval, recommended SSRI, but pt. refused   - Psych recommending atarax PRN  - Continue to monitor mental status    PT/Conditioning  - Continue band exercised while on bedrest s/t IABP, IS    ==================== RESPIRATORY======================  Acute Hypoxemic Respiratory Failure  - s/p x2 intubations for cardiogenic pulm edema and the in setting of cardiac arrest, resolved - extubated 11/10  - Currently on room air with spO2 mid 90s  - Continue incentive spirometry and monitoring of sp02    Asthma  - c/w albuterol, symbicort and spiriva  - On trelegy at home  - continue to monitor SpO2 with goal >94%    ====================CARDIOVASCULAR==================  Vfib arrest i/s/o ischemia  - Lido gtt off   - PO Amio load - total of 5g per EP complete , continue PO amio  - Keep K > 4, Mag > 2.2     Cardiogenic shock requiring IABP (- , -)  - Likely 2/2 NSTEMI and ADHF  -  LHC: pLAD 100 % in-stent restenosis & mRCA, 100 %. PCWP 30. IABP placed.  -  TTE: LV dilated. EF 32 %. Regional WMAs present, mod (grade 2) LV diastolic dysfunction  -  TTE: EF 22% and + LV thrombus  - PRN Bumex IVP as needed, appears dry - 500 cc bolus ordered  - IABP swapped  to RFA, continue 1:1 support  - Off Milrinone gtt @ 7:30 am   - Currently on hydralazine 100 TID and ISD 40 TID for AL reduction  - Lopeno d/c'd due to elevated K levels  - c/w coreg 25 BID for GDMT  - Listed OHT status 2 , f/u HF recs    NSTEMI iso stent re-occlusion of pLAD and 100%  of RCA  - EKG on admission w/LBBB  - DAPT: c/w ASA, Brilinta d/c'd per transplant w/u  - c/w lipitor 80  - cMR deferred given necessity of IABP  - CT sx not recommending CABG, undergoing AT eval    LV thrombus  - c/w heparin gtt    ===================== RENAL =========================  Non-oliguric ARIC   - Baseline Cr:   - s/p 500 LR  - Renal US: no evidence of renal artery stenosis  - Trend BMP, lytes daily, replace as needed  - Continue Strict I/Os, avoid nephrotoxins    Mild Hyponatremia/Hyperkalemia iso ARIC and or new CKD baseline  - Continue fluid restriction   - Can d/c urea powder as per renal  - Trend daily  - Continue monitoring urine output, lytes, SCr/ BUN  - Replete lytes prn with goal K >4 and Mg >2    =============== GASTROINTESTINAL===================  Constipation/ ileus, resolved  - s/p multiple BMs, symptoms improving   - c/w diet    ===================ENDO====================  Type 2 DM  - A1c 8.3  - Continue lantus, premeal, low ISS  - continue FS    ===================HEMATOLOGIC/ONC ===================  - H/H & plts stable  - Monitor H/H and plts  - VTE PPX: heparin gtt    ==================INFECTIOUS DISEASE================  # Leukocytosis  - Repeat BCx pending  - Monitor off abx at this time  - Monitor and trend WBC and temperature curve     # Enterococcus faecalis bacteremia, resolved  - BCx + for enterococcus faecalis x2, Staph epi x1 (likely contaminant)- pan sensitive   - Urine cx  + enterococcus faecalis  - BCx  NGTD  - IABP site swapped to RFA   - s/p Vancomycin 1g q12h (-)  - CT A/P negative for infectious pathology    # COVID, resolved  - Off airborne precautions     # Pre-transplant ID w/u   - Trend ID recs for serologies   - Colonoscopy  - normal   - Chest CT  - improved LLL aeration  - s/p immunizations    Dispo: Maintain in ICU while IABP in place    Patient requires continuous monitoring with bedside rhythm monitoring, pulse ox monitoring, and intermittent blood gas analysis. Care plan discussed with ICU care team. Patient remained critical and at risk for life threatening decompensation.  Patient seen, examined and plan discussed with CCU team during rounds.     I have personally provided ____ minutes of critical care time excluding time spent on separate procedures, in addition to initial critical care time provided by the CICU Attending, Dr. Delacruz.    By signing my name below, I, Kalyn Sousa, attest that this documentation has been prepared under the direction and in the presence of KARYN Choi.   Electronically signed: Rosalba Booth, 23 @ 20:23    I, Niurka Brizuela , personally performed the services described in this documentation. all medical record entries made by the eileenibe were at my direction and in my presence. I have reviewed the chart and agree that the record reflects my personal performance and is accurate and complete  Electronically signed: KARYN Choi.       DEVORAH VALENCIA  MRN-[id number]  Patient is a 58y old  Male who presents with a chief complaint of pre advanced cardiac therapies evaluation (2023 15:39)    HPI:  pt seen and approx 1:30 pm  in [facility]    57yo M w/ hx HTN, CAD w/ 1 stent in , ICH () presenting with abn ekg. Patient presented to [facility] where he was found to have STEMI, recommended to get cath however patient did not want to get it there so it left and came here.  Patient initially had cough, congestion, fever, was placed on antibiotics on .  Started feeling nauseous and had a presyncopal event after which he presented to ED last night.  Had chest pain as well.  Chest pain is midsternal.  Not currently having chest pain.  Received 4 aspirin 30 min pta. (2023 15:11)    24 HOUR EVENTS:  - No acute events during day shift  - Status 2 OHT    REVIEW OF SYSTEMS:  CONSTITUTIONAL: No weakness, fevers or chills  EYES/ENT: No visual changes;  No vertigo or throat pain   NECK: No pain or stiffness  RESPIRATORY: No cough, wheezing, hemoptysis; No shortness of breath  CARDIOVASCULAR: No chest pain or palpitations  GASTROINTESTINAL: No abdominal or epigastric pain. No nausea, vomiting, or hematemesis; No diarrhea or constipation. No melena or hematochezia.  GENITOURINARY: No dysuria, frequency or hematuria  NEUROLOGICAL: No numbness or weakness  SKIN: No itching, rashes      ICU Vital Signs Last 24 Hrs  T(C): 36.8 (2023 19:00), Max: 36.8 (2023 04:00)  T(F): 98.2 (2023 19:00), Max: 98.3 (2023 04:00)  HR: 73 (2023 20:00) (64 - 80)  BP: --  BP(mean): --  ABP: --  ABP(mean): --  RR: 18 (2023 20:00) (14 - 26)  SpO2: 97% (2023 20:00) (95% - 98%)    O2 Parameters below as of 2023 20:00  Patient On (Oxygen Delivery Method): room air            CVP(mm Hg): --  CO: --  CI: --  PA: --  PA(mean): --  PA(direct): --  PCWP: --  LA: --  RA: --  SVR: --  SVRI: --  PVR: --  PVRI: --  I&O's Summary    2023 07:01  -  2023 07:00  --------------------------------------------------------  IN:  mL / OUT: 1200 mL / NET: 812 mL    2023 07:01  -  2023 20:23  --------------------------------------------------------  IN: 1049 mL / OUT: 1240 mL / NET: -191 mL        CAPILLARY BLOOD GLUCOSE    CAPILLARY BLOOD GLUCOSE      POCT Blood Glucose.: 129 mg/dL (2023 17:01)      PHYSICAL EXAM:  GENERAL: No acute distress, well-developed  HEAD:  Atraumatic, Normocephalic  EYES: EOMI, PERRLA, conjunctiva and sclera clear  NECK: Supple, no lymphadenopathy, no JVD  CHEST/LUNG: CTAB; No wheezes, rales, or rhonchi  HEART: Regular rate and rhythm. Normal S1/S2. No murmurs, rubs, or gallops  ABDOMEN: Soft, non-tender, non-distended; normal bowel sounds, no organomegaly  EXTREMITIES:  2+ peripheral pulses b/l, No clubbing, cyanosis, or edema. RF IABP site clean, dry, intact, nontender  NEUROLOGY: A&O x 3, no focal deficits  SKIN: No rashes or lesions    ============================I/O===========================   I&O's Detail    2023 07:  -  2023 07:00  --------------------------------------------------------  IN:    Heparin: 312 mL    Lactated Ringers Bolus: 500 mL    Oral Fluid: 1200 mL  Total IN: 2012 mL    OUT:    Voided (mL): 1200 mL  Total OUT: 1200 mL    Total NET: 812 mL      2023 07:01  -  2023 20:23  --------------------------------------------------------  IN:    Heparin: 169 mL    Oral Fluid: 880 mL  Total IN: 1049 mL    OUT:    Voided (mL): 1240 mL  Total OUT: 1240 mL    Total NET: -191 mL        ============================ LABS =========================                        11.0   8.31  )-----------( 151      ( 2023 00:39 )             32.9     11-30    129<L>  |  99  |  66<H>  ----------------------------<  103<H>  4.2   |  17<L>  |  1.56<H>    Ca    9.3      2023 00:39  Phos  2.7       Mg     2.1         TPro  6.7  /  Alb  3.6  /  TBili  0.4  /  DBili  x   /  AST  16  /  ALT  25  /  AlkPhos  66  1130                LIVER FUNCTIONS - ( 2023 00:39 )  Alb: 3.6 g/dL / Pro: 6.7 g/dL / ALK PHOS: 66 U/L / ALT: 25 U/L / AST: 16 U/L / GGT: x           PT/INR - ( 2023 00:39 )   PT: 13.3 sec;   INR: 1.22 ratio         PTT - ( 2023 00:39 )  PTT:75.6 sec    Lactate, Blood: 1.9 mmol/L (23 @ 05:37)    Urinalysis Basic - ( 2023 00:39 )    Color: x / Appearance: x / SG: x / pH: x  Gluc: 103 mg/dL / Ketone: x  / Bili: x / Urobili: x   Blood: x / Protein: x / Nitrite: x   Leuk Esterase: x / RBC: x / WBC x   Sq Epi: x / Non Sq Epi: x / Bacteria: x      ======================Micro/Rad/Cardio=================  Telemtry: Reviewed   EKG: Reviewed  CXR: Reviewed  Culture: Reviewed   Echo:   Cath: Cardiac Cath Lab - Adult:   [facility]  Department of Cardiology  [address] [place] [address]  [phone]  Cath Lab Report -- Comprehensive Report  Patient: LD VALENCIA  Study date: 2017  Account number: [id number]  MR number: [id number]  : 1965  Gender: Male  Race: W  Case Physician(s):  Jairon Holguin M.D.  Referring Physician:  Luc Lynn M.D.  INDICATIONS: Unstable angina - CCS4.  HISTORY: The patient has a history of coronary artery disease. The patient  hashypertension and medication-treated dyslipidemia.  PROCEDURE:  --  Left heart catheterization with ventriculography.  --  Left coronary angiography.  --  Right coronary angiography.  TECHNIQUE: The risks and alternatives of the procedures and conscious  sedation were explained to the patient and informed consent was obtained.  Cardiac catheterization performed electively.  Local anesthetic given. Right radial artery access. A 6FR PRELUDE KIT was  inserted in the vessel. Left heart catheterization. Ventriculography was  performed. A 5FR FR4.0 EXPO catheter was utilized. Left coronary artery  angiography. The vessel was injected utilizing a 5FR FL3.5 EXPO catheter.  Right coronary artery angiography. The vessel was injected utilizing a 5FR  FR4.0 EXPO catheter. RADIATION EXPOSURE: 1.1 min.  CONTRAST GIVEN: Omnipaque 55 ml.  MEDICATIONS GIVEN: Midazolam, 1 mg, IV. Fentanyl, 25 mcg, IV. Verapamil  (Isoptin, Calan, Covera), 2.5 mg, IA. Heparin, 3000 units, IA.  VENTRICLES: Global left ventricular function was moderately depressed. EF  estimated was 40 %.  CORONARY VESSELS: The coronary circulation is right dominant.  LM:   --  LM: Normal.  LAD:   --  Proximal LAD: There was a 50 % stenosis.  CX:   --  Circumflex: Normal.  RCA:   --  Mid RCA: There was a 40 % stenosis.  --  Distal RCA: There was a 50 % stenosis.  COMPLICATIONS: There were no complications.  DIAGNOSTIC RECOMMENDATIONS: The patient should continue with the present  medications.  Prepared and signed by  Jairon Holguin M.D.  Signed 2017 12:20:13  HEMODYNAMIC TABLES  Pressures:  Baseline/ Room Air  Pressures:  - HR: 78  Pressures:  - Rhythm:  Pressures:  -- Aortic Pressure (S/D/M): --/--/99  Pressures:  -- Left Ventricle (s/edp): 157/39/--  Outputs:  Baseline/ Room Air  Outputs:  -- CALCULATIONS[age]  Outputs:  -- CALCULATIONS: Body Surface Area: 2.05  Outputs:  -- CALCULATIONS: Height in cm: 175.00  Outputs:  -- CALCULATIONS: Sex: Male  Outputs:  -- CALCULATIONS: Weight in k.40 (17 @ 21:55)    ======================================================  PAST MEDICAL & SURGICAL HISTORY:  HTN (hypertension)      CAD (coronary artery disease)  ; stent      Intracranial hemorrhage        Respiratory arrest        Myocardial infarction, unspecified MI type, unspecified artery      History of coronary artery stent placement  ====================ASSESSMENT ==============  58 male with HTN, CAD (s/p PCI ), HFrEF, CVA , and T2DM presenting with chest pressure and unknown tachycardia that was shocked x1, [facility]  found to have in-stent restenosis of pLAD and  of RCA with elevated RA and PA pressures and severely decreased EF 32%. Admitted to CICU for management of cardiogenic shock and ADHF requiring IABP  -, with hospital course c/b vfib arrest requiring reinsertion of IABP. Currently undergoing workup for AT evaluation with HF.    Plan:  ====================== NEUROLOGY=====================  Anxiety  - No Seroquel/antipsychotics since vfib arrest and prolonged QTC  - Psych Eval, recommended SSRI, but pt. refused   - Psych recommending atarax PRN  - Continue to monitor mental status    PT/Conditioning  - Continue band exercised while on bedrest s/t IABP, IS    ==================== RESPIRATORY======================  Acute Hypoxemic Respiratory Failure  - s/p x2 intubations for cardiogenic pulm edema and the in setting of cardiac arrest, resolved - extubated 11/10  - Currently on room air with spO2 mid 90s  - Continue incentive spirometry and monitoring of sp02    Asthma  - c/w albuterol, symbicort and spiriva  - On trelegy at home  - continue to monitor SpO2 with goal >94%    ====================CARDIOVASCULAR==================  Vfib arrest i/s/o ischemia  - Lido gtt off   - PO Amio load - total of 5g per EP complete , continue PO amio  - Keep K > 4, Mag > 2.2     Cardiogenic shock requiring IABP (- , -)  - Likely 2/2 NSTEMI and ADHF  -  LHC: pLAD 100 % in-stent restenosis & mRCA, 100 %. PCWP 30. IABP placed.  -  TTE: LV dilated. EF 32 %. Regional WMAs present, mod (grade 2) LV diastolic dysfunction  -  TTE: EF 22% and + LV thrombus  - PRN Bumex IVP as needed, appears dry - 500 cc bolus ordered  - IABP swapped  to RFA, continue 1:1 support  - Off Milrinone gtt @ 7:30 am   - Currently on hydralazine 100 TID and ISD 40 TID for AL reduction  - James d/c'd due to elevated K levels  - c/w coreg 25 BID for GDMT  - Listed OHT status 2 , f/u HF recs    NSTEMI iso stent re-occlusion of pLAD and 100%  of RCA  - EKG on admission w/LBBB  - DAPT: c/w ASA, Brilinta d/c'd per transplant w/u  - c/w lipitor 80  - cMR deferred given necessity of IABP  - CT sx not recommending CABG, undergoing AT eval    LV thrombus  - c/w heparin gtt    ===================== RENAL =========================  Non-oliguric ARIC   - Baseline Cr: 1-  - s/p 500 LR  - Renal US: no evidence of renal artery stenosis  - Trend BMP, lytes daily, replace as needed  - Continue Strict I/Os, avoid nephrotoxins    Mild Hyponatremia/Hyperkalemia iso ARIC and or new CKD baseline  - Continue fluid restriction   - Can d/c urea powder as per renal  - Trend daily  - Continue monitoring urine output, lytes, SCr/ BUN  - Replete lytes prn with goal K >4 and Mg >2    =============== GASTROINTESTINAL===================  Constipation/ ileus, resolved  - s/p multiple BMs, symptoms improving   - c/w diet    ===================ENDO====================  Type 2 DM  - A1c 8.3  - Continue lantus, premeal, low ISS  - continue FS    ===================HEMATOLOGIC/ONC ===================  - H/H & plts stable  - Monitor H/H and plts  - VTE PPX: heparin gtt    ==================INFECTIOUS DISEASE================  # Leukocytosis  - Repeat BCx pending  - Monitor off abx at this time  - Monitor and trend WBC and temperature curve     # Enterococcus faecalis bacteremia, resolved  - BCx + for enterococcus faecalis x2, Staph epi x1 (likely contaminant)- pan sensitive   - Urine cx  + enterococcus faecalis  - BCx  NGTD  - IABP site swapped to RFA   - s/p Vancomycin 1g q12h (-)  - CT A/P negative for infectious pathology    # COVID, resolved  - Off airborne precautions     # Pre-transplant ID w/u   - Trend ID recs for serologies   - Colonoscopy  - normal   - Chest CT  - improved LLL aeration  - s/p immunizations    Dispo: Maintain in ICU while IABP in place    Patient requires continuous monitoring with bedside rhythm monitoring, pulse ox monitoring, and intermittent blood gas analysis. Care plan discussed with ICU care team. Patient remained critical and at risk for life threatening decompensation.  Patient seen, examined and plan discussed with CCU team during rounds.     I have personally provided 35 minutes of critical care time excluding time spent on separate procedures, in addition to initial critical care time provided by the CICU Attending, Dr. Delacruz.    By signing my name below, I, Kalyn Sousa, attest that this documentation has been prepared under the direction and in the presence of KARYN Choi.   Electronically signed: Sangita Booth, 23 @ 20:23    I, Niurka Brizuela , personally performed the services described in this documentation. all medical record entries made by the sangita were at my direction and in my presence. I have reviewed the chart and agree that the record reflects my personal performance and is accurate and complete  Electronically signed: KARYN Choi.

## 2023-11-30 NOTE — PROGRESS NOTE ADULT - CRITICAL CARE ATTENDING COMMENT
58 male with HTN, CAD (s/p PCI 2008), HFrEF, CVA 2018, and T2DM a/w cardiogenic shock requiring IABP.         Neuro: A/O, no active issues  Resp: No O2 requirements  CVS: Well supported on IABP. Tolerating coreg, hdzn/isordil. Therapeutic on hep gtt, c/w amio for VT. Listed status 2. c/w asa and lipitor.  GI: Abd pain and distension likely due to constipation, improved now after aggressive bowel regimen  Renal: ARIC resolved, hyponatremia stable   ID: Monitor off abx, mild leukocytosis resolved.   Endo: Monitor sugars, titrate insulin  Heme: hep gtt, plt stable  R fem IABP 11/20.

## 2023-11-30 NOTE — PROGRESS NOTE ADULT - PROBLEM SELECTOR PLAN 1
ARIC: in the setting of heart failure, COVID infection. At the time of presentation Scr is 1.25. Started to worsen on 11/4 and peaked at 4.07 11/10 and gradually improved to 1.8 on 11/18. Pt had LHC on 11/1. He has hx of DM with proteinuria of 684 but most recent UA negative. Primary team is planning to get cardiac transplant eval. Remains nonoliguric, UOP 1.7L/24h. HIV, Hep B, Hep C negative. A1c - 8.3. Complements are not low. BETZAIDA and ANCA negative. Light chains - not significant. UPCR <0.1, PLA2R ab - negative, Anti GBM abs - negative. UPEP negative.   Duplex - normal, symmetric blood flow throughout both kidneys. Velocities and RIs are limited by IABP.   Serum creatinine has worsened slightly  to 1.9  then today it improved to 1.5 today. Pt's Hemodynamics are managed by cardio team and pt remained on IABP.    PLAN:  No indication for dialysis.  Avoid nephrotoxic agents. Dose meds per eGFR.

## 2023-11-30 NOTE — PROGRESS NOTE ADULT - SUBJECTIVE AND OBJECTIVE BOX
[facility] DIVISION OF KIDNEY DISEASES AND HYPERTENSION -- FOLLOW UP NOTE  --------------------------------------------------------------------------------  Chief Complaint:    24 hour events/subjective:        PAST HISTORY  --------------------------------------------------------------------------------  No significant changes to PMH, PSH, FHx, SHx, unless otherwise noted    ALLERGIES & MEDICATIONS  --------------------------------------------------------------------------------  Allergies    penicillins (Unknown)    Intolerances      Standing Inpatient Medications  aMIOdarone    Tablet   Oral   aMIOdarone    Tablet 200 milliGRAM(s) Oral daily  aspirin  chewable 81 milliGRAM(s) Oral daily  atorvastatin 80 milliGRAM(s) Oral at bedtime  budesonide  80 MICROgram(s)/formoterol 4.5 MICROgram(s) Inhaler 2 Puff(s) Inhalation two times a day  carvedilol 25 milliGRAM(s) Oral every 12 hours  chlorhexidine 2% Cloths 1 Application(s) Topical daily  heparin  Infusion 1300 Unit(s)/Hr IV Continuous <Continuous>  hydrALAZINE 100 milliGRAM(s) Oral three times a day  insulin glargine Injectable (LANTUS) 12 Unit(s) SubCutaneous at bedtime  insulin lispro (ADMELOG) corrective regimen sliding scale   SubCutaneous at bedtime  insulin lispro (ADMELOG) corrective regimen sliding scale   SubCutaneous three times a day before meals  insulin lispro Injectable (ADMELOG) 9 Unit(s) SubCutaneous three times a day before meals  isosorbide   dinitrate Tablet (ISORDIL) 40 milliGRAM(s) Oral three times a day  polyethylene glycol 3350 17 Gram(s) Oral two times a day  senna 2 Tablet(s) Oral at bedtime  zoster Vaccine recombinant (SHINGRIX) 0.5 milliLiter(s) IntraMuscular once    PRN Inpatient Medications  hydrOXYzine hydrochloride 25 milliGRAM(s) Oral two times a day PRN      REVIEW OF SYSTEMS  --------------------------------------------------------------------------------  Gen: No weight changes, fatigue, fevers/chills, weakness  Skin: No rashes  Head/Eyes/Ears/Mouth: No headache; Normal hearing; Normal vision w/o blurriness; No sinus pain/discomfort, sore throat  Respiratory: No dyspnea, cough, wheezing, hemoptysis  CV: No chest pain, PND, orthopnea  GI: No abdominal pain, diarrhea, constipation, nausea, vomiting, melena, hematochezia  : No increased frequency, dysuria, hematuria, nocturia  MSK: No joint pain/swelling; no back pain; no edema  Neuro: No dizziness/lightheadedness, weakness, seizures, numbness, tingling  Heme: No easy bruising or bleeding  Endo: No heat/cold intolerance  Psych: No significant nervousness, anxiety, stress, depression    All other systems were reviewed and are negative, except as noted.    VITALS/PHYSICAL EXAM  --------------------------------------------------------------------------------  T(C): 36.7 (11-30-23 @ 08:00), Max: 36.8 (11-30-23 @ 04:00)  HR: 79 (11-30-23 @ 13:00) (66 - 79)  BP: --  RR: 19 (11-30-23 @ 13:00) (14 - 46)  SpO2: 97% (11-30-23 @ 13:00) (94% - 98%)  Wt(kg): --        11-29-23 @ 07:01  -  11-30-23 @ 07:00  --------------------------------------------------------  IN: 2012 mL / OUT: 1200 mL / NET: 812 mL    11-30-23 @ 07:01  -  11-30-23 @ 13:00  --------------------------------------------------------  IN: 276 mL / OUT: 720 mL / NET: -444 mL      Physical Exam:  	Gen: NAD, well-appearing  	HEENT: PERRL, supple neck, clear oropharynx  	Pulm: CTA B/L  	CV: RRR, S1S2; no rub  	Back: No spinal or CVA tenderness; no sacral edema  	Abd: +BS, soft, nontender/nondistended  	: No suprapubic tenderness  	UE: Warm, FROM, no clubbing, intact strength; no edema; no asterixis  	LE: Warm, FROM, no clubbing, intact strength; no edema  	Neuro: No focal deficits, intact gait  	Psych: Normal affect and mood  	Skin: Warm, without rashes  	Vascular access:    LABS/STUDIES  --------------------------------------------------------------------------------              11.0   8.31  >-----------<  151      [11-30-23 @ 00:39]              32.9     129  |  99  |  66  ----------------------------<  103      [11-30-23 @ 00:39]  4.2   |  17  |  1.56        Ca     9.3     [11-30-23 @ 00:39]      Mg     2.1     [11-30-23 @ 00:39]      Phos  2.7     [11-30-23 @ 00:39]    TPro  6.7  /  Alb  3.6  /  TBili  0.4  /  DBili  x   /  AST  16  /  ALT  25  /  AlkPhos  66  [11-30-23 @ 00:39]    PT/INR: PT 13.3 , INR 1.22       [11-30-23 @ 00:39]  PTT: 75.6       [11-30-23 @ 00:39]      Creatinine Trend:  SCr 1.56 [11-30 @ 00:39]  SCr 1.64 [11-29 @ 12:42]  SCr 1.96 [11-29 @ 05:37]  SCr 1.52 [11-28 @ 02:25]  SCr 1.50 [11-27 @ 15:08]    Urinalysis - [11-30-23 @ 00:39]      Color  / Appearance  / SG  / pH       Gluc 103 / Ketone   / Bili  / Urobili        Blood  / Protein  / Leuk Est  / Nitrite       RBC  / WBC  / Hyaline  / Gran  / Sq Epi  / Non Sq Epi  / Bacteria     Urine Creatinine 100      [11-29-23 @ 15:43]  Urine Sodium 21      [11-29-23 @ 15:43]  Urine Potassium 28      [11-29-23 @ 15:43]  Urine Osmolality 715      [11-29-23 @ 15:43]    Iron 46, TIBC 234, %sat 20      [11-10-23 @ 17:25]  Ferritin 1646      [11-10-23 @ 17:29]  TSH 0.38      [11-10-23 @ 17:29]  Lipid: chol 130, TG 95, HDL 37, LDL --      [11-10-23 @ 17:25]    HBsAb Nonreact      [11-10-23 @ 17:29]  HBcAb Nonreact      [11-10-23 @ 17:29]  HCV 0.09, Nonreact      [11-10-23 @ 17:29]    BETZAIDA: titer Negative, pattern --      [11-10-23 @ 17:29]  C3 Complement 171      [11-19-23 @ 02:54]  C4 Complement 45      [11-19-23 @ 02:54]  Rheumatoid Factor 13      [11-10-23 @ 17:25]  ANCA: cANCA Negative, pANCA Negative, atypical ANCA Negative      [11-21-23 @ 03:50]  anti-GBM <0.2      [11-19-23 @ 02:54]  Syphilis Screen (Treponema Pallidum Ab) Negative      [11-10-23 @ 17:29]  PLA2R: JACEK <1.8, IFA --      [11-19-23 @ 02:54]  Free Light Chains: kappa 3.76, lambda 3.25, ratio = 1.16      [11-19 @ 02:54]  Immunofixation Serum:   No Monoclonal Band Identified      Reference Range: None Detected      [11-19-23 @ 02:54]  SPEP Interpretation: Normal Electrophoresis Pattern      [11-10-23 @ 17:29]  Immunofixation Urine: No Monoclonal Band Identified      Reference Range: None Detected      [11-12-23 @ 05:03]  UPEP Interpretation: Normal Electrophoresis Pattern      [11-12-23 @ 05:03]   [facility] DIVISION OF KIDNEY DISEASES AND HYPERTENSION -- FOLLOW UP NOTE  --------------------------------------------------------------------------------  Chief Complaint:    24 hour events/subjective:  Pt was seen and examined at the bedside. No acute overnight events. No fresh complaints.   Pt denies SOB/ Constipation/ Diarrhea/ Nausea/ Vomiting/ abdominal pain/ chest pain/ tingling/ numbness.       PAST HISTORY  --------------------------------------------------------------------------------  No significant changes to PMH, PSH, FHx, SHx, unless otherwise noted    ALLERGIES & MEDICATIONS  --------------------------------------------------------------------------------  Allergies    penicillins (Unknown)    Intolerances      Standing Inpatient Medications  aMIOdarone    Tablet   Oral   aMIOdarone    Tablet 200 milliGRAM(s) Oral daily  aspirin  chewable 81 milliGRAM(s) Oral daily  atorvastatin 80 milliGRAM(s) Oral at bedtime  budesonide  80 MICROgram(s)/formoterol 4.5 MICROgram(s) Inhaler 2 Puff(s) Inhalation two times a day  carvedilol 25 milliGRAM(s) Oral every 12 hours  chlorhexidine 2% Cloths 1 Application(s) Topical daily  heparin  Infusion 1300 Unit(s)/Hr IV Continuous <Continuous>  hydrALAZINE 100 milliGRAM(s) Oral three times a day  insulin glargine Injectable (LANTUS) 12 Unit(s) SubCutaneous at bedtime  insulin lispro (ADMELOG) corrective regimen sliding scale   SubCutaneous at bedtime  insulin lispro (ADMELOG) corrective regimen sliding scale   SubCutaneous three times a day before meals  insulin lispro Injectable (ADMELOG) 9 Unit(s) SubCutaneous three times a day before meals  isosorbide   dinitrate Tablet (ISORDIL) 40 milliGRAM(s) Oral three times a day  polyethylene glycol 3350 17 Gram(s) Oral two times a day  senna 2 Tablet(s) Oral at bedtime  zoster Vaccine recombinant (SHINGRIX) 0.5 milliLiter(s) IntraMuscular once    PRN Inpatient Medications  hydrOXYzine hydrochloride 25 milliGRAM(s) Oral two times a day PRN      REVIEW OF SYSTEMS  --------------------------------------------------------------------------------  As above.    VITALS/PHYSICAL EXAM  --------------------------------------------------------------------------------  T(C): 36.7 (11-30-23 @ 08:00), Max: 36.8 (11-30-23 @ 04:00)  HR: 79 (11-30-23 @ 13:00) (66 - 79)  BP: --  RR: 19 (11-30-23 @ 13:00) (14 - 46)  SpO2: 97% (11-30-23 @ 13:00) (94% - 98%)  Wt(kg): --        11-29-23 @ 07:01  -  11-30-23 @ 07:00  --------------------------------------------------------  IN: 2012 mL / OUT: 1200 mL / NET: 812 mL    11-30-23 @ 07:01  -  11-30-23 @ 13:00  --------------------------------------------------------  IN: 276 mL / OUT: 720 mL / NET: -444 mL      Physical Exam:  Gen: NAD, well-appearing  HEENT: PERRL, supple neck, clear oropharynx  Pulm: CTA B/L  CV: S1S2; IABP +  Back: No spinal or CVA tenderness  Abd: +BS, soft, nontender/nondistended  : No suprapubic tenderness   Extremities: no bilateral LE edema noted.   Neuro: No focal deficits  Skin: Interval improvement in the non- blanching rash.   Vascular access: NA        LABS/STUDIES  --------------------------------------------------------------------------------              11.0   8.31  >-----------<  151      [11-30-23 @ 00:39]              32.9     129  |  99  |  66  ----------------------------<  103      [11-30-23 @ 00:39]  4.2   |  17  |  1.56        Ca     9.3     [11-30-23 @ 00:39]      Mg     2.1     [11-30-23 @ 00:39]      Phos  2.7     [11-30-23 @ 00:39]    TPro  6.7  /  Alb  3.6  /  TBili  0.4  /  DBili  x   /  AST  16  /  ALT  25  /  AlkPhos  66  [11-30-23 @ 00:39]    PT/INR: PT 13.3 , INR 1.22       [11-30-23 @ 00:39]  PTT: 75.6       [11-30-23 @ 00:39]      Creatinine Trend:  SCr 1.56 [11-30 @ 00:39]  SCr 1.64 [11-29 @ 12:42]  SCr 1.96 [11-29 @ 05:37]  SCr 1.52 [11-28 @ 02:25]  SCr 1.50 [11-27 @ 15:08]    Urinalysis - [11-30-23 @ 00:39]      Color  / Appearance  / SG  / pH       Gluc 103 / Ketone   / Bili  / Urobili        Blood  / Protein  / Leuk Est  / Nitrite       RBC  / WBC  / Hyaline  / Gran  / Sq Epi  / Non Sq Epi  / Bacteria     Urine Creatinine 100      [11-29-23 @ 15:43]  Urine Sodium 21      [11-29-23 @ 15:43]  Urine Potassium 28      [11-29-23 @ 15:43]  Urine Osmolality 715      [11-29-23 @ 15:43]    Iron 46, TIBC 234, %sat 20      [11-10-23 @ 17:25]  Ferritin 1646      [11-10-23 @ 17:29]  TSH 0.38      [11-10-23 @ 17:29]  Lipid: chol 130, TG 95, HDL 37, LDL --      [11-10-23 @ 17:25]    HBsAb Nonreact      [11-10-23 @ 17:29]  HBcAb Nonreact      [11-10-23 @ 17:29]  HCV 0.09, Nonreact      [11-10-23 @ 17:29]    BETZAIDA: titer Negative, pattern --      [11-10-23 @ 17:29]  C3 Complement 171      [11-19-23 @ 02:54]  C4 Complement 45      [11-19-23 @ 02:54]  Rheumatoid Factor 13      [11-10-23 @ 17:25]  ANCA: cANCA Negative, pANCA Negative, atypical ANCA Negative      [11-21-23 @ 03:50]  anti-GBM <0.2      [11-19-23 @ 02:54]  Syphilis Screen (Treponema Pallidum Ab) Negative      [11-10-23 @ 17:29]  PLA2R: JACEK <1.8, IFA --      [11-19-23 @ 02:54]  Free Light Chains: kappa 3.76, lambda 3.25, ratio = 1.16      [11-19 @ 02:54]  Immunofixation Serum:   No Monoclonal Band Identified      Reference Range: None Detected      [11-19-23 @ 02:54]  SPEP Interpretation: Normal Electrophoresis Pattern      [11-10-23 @ 17:29]  Immunofixation Urine: No Monoclonal Band Identified      Reference Range: None Detected      [11-12-23 @ 05:03]  UPEP Interpretation: Normal Electrophoresis Pattern      [11-12-23 @ 05:03]

## 2023-11-30 NOTE — PROGRESS NOTE ADULT - SUBJECTIVE AND OBJECTIVE BOX
PATIENT:  DEVORAH VALENCIA  [id number]    CHIEF COMPLAINT:  Patient is a 58y old male who presents with a chief complaint of Cardiogenic shock (2023 19:26)      INTERVAL HISTORY/OVERNIGHT EVENTS:      REVIEW OF SYSTEMS:    Constitutional:     [ ] negative [ ] fevers [ ] chills [ ] weight loss [ ] weight gain  HEENT:                  [ ] negative [ ] dry eyes [ ] eye irritation [ ] postnasal drip [ ] nasal congestion  CV:                         [ ] negative  [ ] chest pain [ ] orthopnea [ ] palpitations [ ] murmur  Resp:                     [ ] negative [ ] cough [ ] shortness of breath [ ] dyspnea [ ] wheezing [ ] sputum [ ] hemoptysis  GI:                          [ ] negative [ ] nausea [ ] vomiting [ ] diarrhea [ ] constipation [ ] abd pain [ ] dysphagia   :                        [ ] negative [ ] dysuria [ ] nocturia [ ] hematuria [ ] increased urinary frequency  Musculoskeletal: [ ] negative [ ] back pain [ ] myalgias [ ] arthralgias [ ] fracture  Skin:                       [ ] negative [ ] rash [ ] itch  Neurological:        [ ] negative [ ] headache [ ] dizziness [ ] syncope [ ] weakness [ ] numbness  Psychiatric:           [ ] negative [ ] anxiety [ ] depression    MEDICATIONS:  MEDICATIONS  (STANDING):  aMIOdarone    Tablet   Oral   aMIOdarone    Tablet 200 milliGRAM(s) Oral daily  aspirin  chewable 81 milliGRAM(s) Oral daily  atorvastatin 80 milliGRAM(s) Oral at bedtime  budesonide  80 MICROgram(s)/formoterol 4.5 MICROgram(s) Inhaler 2 Puff(s) Inhalation two times a day  carvedilol 25 milliGRAM(s) Oral every 12 hours  chlorhexidine 2% Cloths 1 Application(s) Topical daily  heparin  Infusion 1300 Unit(s)/Hr (13 mL/Hr) IV Continuous <Continuous>  hydrALAZINE 100 milliGRAM(s) Oral three times a day  insulin glargine Injectable (LANTUS) 12 Unit(s) SubCutaneous at bedtime  insulin lispro (ADMELOG) corrective regimen sliding scale   SubCutaneous at bedtime  insulin lispro (ADMELOG) corrective regimen sliding scale   SubCutaneous three times a day before meals  insulin lispro Injectable (ADMELOG) 9 Unit(s) SubCutaneous three times a day before meals  isosorbide   dinitrate Tablet (ISORDIL) 40 milliGRAM(s) Oral three times a day  polyethylene glycol 3350 17 Gram(s) Oral two times a day  senna 2 Tablet(s) Oral at bedtime  urea Oral Powder 15 Gram(s) Oral two times a day  zoster Vaccine recombinant (SHINGRIX) 0.5 milliLiter(s) IntraMuscular once    MEDICATIONS  (PRN):  hydrOXYzine hydrochloride 25 milliGRAM(s) Oral two times a day PRN Anxiety      ALLERGIES:  Allergies  penicillins (Unknown)    Intolerances    OBJECTIVE:  ICU Vital Signs Last 24 Hrs  T(C): 36.6 (2023 04:00), Max: 36.8 (2023 07:00)  T(F): 97.9 (2023 04:00), Max: 98.2 (2023 07:00)  HR: 70 (:00) (66 - 76)  BP: 89/51 (2023 11:00) (89/51 - 89/51)  BP(mean): 64 (2023 11:00) (64 - 64)  ABP: --  ABP(mean): --  RR: 15 (:00) (13 - 46)  SpO2: 95% (:00) (94% - 98%)    O2 Parameters below as of 2023 06:00  Patient On (Oxygen Delivery Method): room air    CAPILLARY BLOOD GLUCOSE  POCT Blood Glucose.: 113 mg/dL (2023 21:51)  POCT Blood Glucose.: 88 mg/dL (2023 16:16)  POCT Blood Glucose.: 162 mg/dL (2023 11:35)  POCT Blood Glucose.: 184 mg/dL (2023 07:55)    I&O's Summary    2023 07:01  -  2023 07:00  --------------------------------------------------------  IN: 652 mL / OUT: 825 mL / NET: -173 mL    2023 07:01  -  2023 06:37  --------------------------------------------------------  IN:  mL / OUT: 1200 mL / NET: 799 mL      Daily     Daily Weight in k.7 (2023 06:00)    PHYSICAL EXAMINATION:  General: WN/WD NAD  HEENT: PERRLA, EOMI, moist mucous membranes  Neurology: A&Ox3, nonfocal, MOISE x 4  Respiratory: CTA B/L, normal respiratory effort, no wheezes, crackles, rales  CV: RRR, S1S2, no murmurs, rubs or gallops  Abdominal: Soft, NT, ND +BS, Last BM  Extremities: No edema, + peripheral pulses  Lines:    LABS:                          11.0   8.31  )-----------( 151      ( 2023 00:39 )             32.9     11    129<L>  |  99  |  66<H>  ----------------------------<  103<H>  4.2   |  17<L>  |  1.56<H>    Ca    9.3      2023 00:39  Phos  2.7       Mg     2.1         TPro  6.7  /  Alb  3.6  /  TBili  0.4  /  DBili  x   /  AST  16  /  ALT  25  /  AlkPhos  66  11-30    LIVER FUNCTIONS - ( 2023 00:39 )  Alb: 3.6 g/dL / Pro: 6.7 g/dL / ALK PHOS: 66 U/L / ALT: 25 U/L / AST: 16 U/L / GGT: x           PT/INR - ( 2023 00:39 )   PT: 13.3 sec;   INR: 1.22 ratio    PTT - ( 2023 00:39 )  PTT:75.6 sec    Urinalysis Basic - ( 2023 00:39 )    Color: x / Appearance: x / SG: x / pH: x  Gluc: 103 mg/dL / Ketone: x  / Bili: x / Urobili: x   Blood: x / Protein: x / Nitrite: x   Leuk Esterase: x / RBC: x / WBC x   Sq Epi: x / Non Sq Epi: x / Bacteria: x PATIENT:  DEVORAH VALENCIA  [id number]    CHIEF COMPLAINT:  Patient is a 58y old male who presents with a chief complaint of Cardiogenic shock (2023 19:26)      INTERVAL HISTORY/OVERNIGHT EVENTS:  - No acute events overnight    REVIEW OF SYSTEMS:    Constitutional:     [X] negative [ ] fevers [ ] chills [ ] weight loss [ ] weight gain  HEENT:                  [X] negative [ ] dry eyes [ ] eye irritation [ ] postnasal drip [ ] nasal congestion  CV:                         [X] negative  [ ] chest pain [ ] orthopnea [ ] palpitations [ ] murmur  Resp:                     [X] negative [ ] cough [ ] shortness of breath [ ] dyspnea [ ] wheezing [ ] sputum [ ] hemoptysis  GI:                          [X] negative [ ] nausea [ ] vomiting [ ] diarrhea [ ] constipation [ ] abd pain [ ] dysphagia   :                        [X] negative [ ] dysuria [ ] nocturia [ ] hematuria [ ] increased urinary frequency  Musculoskeletal: [X] negative [ ] back pain [ ] myalgias [ ] arthralgias [ ] fracture  Skin:                       [X] negative [ ] rash [ ] itch  Neurological:        [X] negative [ ] headache [ ] dizziness [ ] syncope [ ] weakness [ ] numbness  Psychiatric:           [X] negative [ ] anxiety [ ] depression    MEDICATIONS:  MEDICATIONS  (STANDING):  aMIOdarone    Tablet   Oral   aMIOdarone    Tablet 200 milliGRAM(s) Oral daily  aspirin  chewable 81 milliGRAM(s) Oral daily  atorvastatin 80 milliGRAM(s) Oral at bedtime  budesonide  80 MICROgram(s)/formoterol 4.5 MICROgram(s) Inhaler 2 Puff(s) Inhalation two times a day  carvedilol 25 milliGRAM(s) Oral every 12 hours  chlorhexidine 2% Cloths 1 Application(s) Topical daily  heparin  Infusion 1300 Unit(s)/Hr (13 mL/Hr) IV Continuous <Continuous>  hydrALAZINE 100 milliGRAM(s) Oral three times a day  insulin glargine Injectable (LANTUS) 12 Unit(s) SubCutaneous at bedtime  insulin lispro (ADMELOG) corrective regimen sliding scale   SubCutaneous at bedtime  insulin lispro (ADMELOG) corrective regimen sliding scale   SubCutaneous three times a day before meals  insulin lispro Injectable (ADMELOG) 9 Unit(s) SubCutaneous three times a day before meals  isosorbide   dinitrate Tablet (ISORDIL) 40 milliGRAM(s) Oral three times a day  polyethylene glycol 3350 17 Gram(s) Oral two times a day  senna 2 Tablet(s) Oral at bedtime  urea Oral Powder 15 Gram(s) Oral two times a day  zoster Vaccine recombinant (SHINGRIX) 0.5 milliLiter(s) IntraMuscular once    MEDICATIONS  (PRN):  hydrOXYzine hydrochloride 25 milliGRAM(s) Oral two times a day PRN Anxiety      ALLERGIES:  Allergies  penicillins (Unknown)    Intolerances    OBJECTIVE:  ICU Vital Signs Last 24 Hrs  T(C): 36.6 (2023 04:00), Max: 36.8 (2023 07:00)  T(F): 97.9 (2023 04:00), Max: 98.2 (2023 07:00)  HR: 70 (2023 06:00) (66 - 76)  BP: 89/51 (2023 11:00) (89/51 - 89/51)  BP(mean): 64 (2023 11:00) (64 - 64)  ABP: --  ABP(mean): --  RR: 15 (2023 06:00) (13 - 46)  SpO2: 95% (2023 06:00) (94% - 98%)    O2 Parameters below as of 2023 06:00  Patient On (Oxygen Delivery Method): room air    CAPILLARY BLOOD GLUCOSE  POCT Blood Glucose.: 113 mg/dL (2023 21:51)  POCT Blood Glucose.: 88 mg/dL (2023 16:16)  POCT Blood Glucose.: 162 mg/dL (2023 11:35)  POCT Blood Glucose.: 184 mg/dL (2023 07:55)    I&O's Summary    2023 07:01  -  2023 07:00  --------------------------------------------------------  IN: 652 mL / OUT: 825 mL / NET: -173 mL    2023 07:01  -  2023 06:37  --------------------------------------------------------  IN:  mL / OUT: 1200 mL / NET: 799 mL      Daily     Daily Weight in k.7 (2023 06:00)    PHYSICAL EXAMINATION:  General: WN/WD NAD  HEENT: PERRLA, EOMI, moist mucous membranes  Neurology: A&Ox3, nonfocal, MOISE x 4  Respiratory: CTA B/L, normal respiratory effort, no wheezes, crackles, rales  CV: RRR, S1S2, no murmurs, rubs or gallops  Abdominal: Soft, NT, ND +BS, Last BM  Extremities: No edema, + peripheral pulses  Lines:    LABS:                          11.0   8.31  )-----------( 151      ( 2023 00:39 )             32.9         129<L>  |  99  |  66<H>  ----------------------------<  103<H>  4.2   |  17<L>  |  1.56<H>    Ca    9.3      2023 00:39  Phos  2.7       Mg     2.1         TPro  6.7  /  Alb  3.6  /  TBili  0.4  /  DBili  x   /  AST  16  /  ALT  25  /  AlkPhos  66  11-30    LIVER FUNCTIONS - ( 2023 00:39 )  Alb: 3.6 g/dL / Pro: 6.7 g/dL / ALK PHOS: 66 U/L / ALT: 25 U/L / AST: 16 U/L / GGT: x           PT/INR - ( 2023 00:39 )   PT: 13.3 sec;   INR: 1.22 ratio    PTT - ( 2023 00:39 )  PTT:75.6 sec    Urinalysis Basic - ( 2023 00:39 )    Color: x / Appearance: x / SG: x / pH: x  Gluc: 103 mg/dL / Ketone: x  / Bili: x / Urobili: x   Blood: x / Protein: x / Nitrite: x   Leuk Esterase: x / RBC: x / WBC x   Sq Epi: x / Non Sq Epi: x / Bacteria: x PATIENT:  DEVORAH VALENCIA  [id number]    CHIEF COMPLAINT:  Patient is a 58y old male who presents with a chief complaint of Cardiogenic shock (2023 19:26)      INTERVAL HISTORY/OVERNIGHT EVENTS:  - No acute events overnight    REVIEW OF SYSTEMS:    Constitutional:     [X] negative [ ] fevers [ ] chills [ ] weight loss [ ] weight gain  HEENT:                  [X] negative [ ] dry eyes [ ] eye irritation [ ] postnasal drip [ ] nasal congestion  CV:                         [X] negative  [ ] chest pain [ ] orthopnea [ ] palpitations [ ] murmur  Resp:                     [X] negative [ ] cough [ ] shortness of breath [ ] dyspnea [ ] wheezing [ ] sputum [ ] hemoptysis  GI:                          [X] negative [ ] nausea [ ] vomiting [ ] diarrhea [ ] constipation [ ] abd pain [ ] dysphagia   :                        [X] negative [ ] dysuria [ ] nocturia [ ] hematuria [ ] increased urinary frequency  Musculoskeletal: [X] negative [ ] back pain [ ] myalgias [ ] arthralgias [ ] fracture  Skin:                       [X] negative [ ] rash [ ] itch  Neurological:        [X] negative [ ] headache [ ] dizziness [ ] syncope [ ] weakness [ ] numbness  Psychiatric:           [X] negative [ ] anxiety [ ] depression    MEDICATIONS:  MEDICATIONS  (STANDING):  aMIOdarone    Tablet   Oral   aMIOdarone    Tablet 200 milliGRAM(s) Oral daily  aspirin  chewable 81 milliGRAM(s) Oral daily  atorvastatin 80 milliGRAM(s) Oral at bedtime  budesonide  80 MICROgram(s)/formoterol 4.5 MICROgram(s) Inhaler 2 Puff(s) Inhalation two times a day  carvedilol 25 milliGRAM(s) Oral every 12 hours  chlorhexidine 2% Cloths 1 Application(s) Topical daily  heparin  Infusion 1300 Unit(s)/Hr (13 mL/Hr) IV Continuous <Continuous>  hydrALAZINE 100 milliGRAM(s) Oral three times a day  insulin glargine Injectable (LANTUS) 12 Unit(s) SubCutaneous at bedtime  insulin lispro (ADMELOG) corrective regimen sliding scale   SubCutaneous at bedtime  insulin lispro (ADMELOG) corrective regimen sliding scale   SubCutaneous three times a day before meals  insulin lispro Injectable (ADMELOG) 9 Unit(s) SubCutaneous three times a day before meals  isosorbide   dinitrate Tablet (ISORDIL) 40 milliGRAM(s) Oral three times a day  polyethylene glycol 3350 17 Gram(s) Oral two times a day  senna 2 Tablet(s) Oral at bedtime  urea Oral Powder 15 Gram(s) Oral two times a day  zoster Vaccine recombinant (SHINGRIX) 0.5 milliLiter(s) IntraMuscular once    MEDICATIONS  (PRN):  hydrOXYzine hydrochloride 25 milliGRAM(s) Oral two times a day PRN Anxiety      ALLERGIES:  Allergies  penicillins (Unknown)    Intolerances    OBJECTIVE:  ICU Vital Signs Last 24 Hrs  T(C): 36.6 (2023 04:00), Max: 36.8 (2023 07:00)  T(F): 97.9 (2023 04:00), Max: 98.2 (2023 07:00)  HR: 70 (2023 06:00) (66 - 76)  BP: 89/51 (2023 11:00) (89/51 - 89/51)  BP(mean): 64 (2023 11:00) (64 - 64)  ABP: --  ABP(mean): --  RR: 15 (2023 06:00) (13 - 46)  SpO2: 95% (2023 06:00) (94% - 98%)    O2 Parameters below as of 2023 06:00  Patient On (Oxygen Delivery Method): room air    CAPILLARY BLOOD GLUCOSE  POCT Blood Glucose.: 113 mg/dL (2023 21:51)  POCT Blood Glucose.: 88 mg/dL (2023 16:16)  POCT Blood Glucose.: 162 mg/dL (2023 11:35)  POCT Blood Glucose.: 184 mg/dL (2023 07:55)    I&O's Summary    2023 07:01  -  2023 07:00  --------------------------------------------------------  IN: 652 mL / OUT: 825 mL / NET: -173 mL    2023 07:01  -  2023 06:37  --------------------------------------------------------  IN:  mL / OUT: 1200 mL / NET: 799 mL      Daily     Daily Weight in k.7 (2023 06:00)    PHYSICAL EXAMINATION:  General: WN/WD NAD  HEENT: PERRLA, EOMI, moist mucous membranes  Neurology: A&Ox3, nonfocal, MOISE x 4  Respiratory: CTA B/L, normal respiratory effort, no wheezes, crackles, rales  CV: RRR, S1S2, no murmurs, rubs or gallops  Abdominal: Soft, NT, ND +BS  Extremities: Warm and dry, no edema, + peripheral pulses  Lines: CDI    LABS:                          11.0   8.31  )-----------( 151      ( 2023 00:39 )             32.9         129<L>  |  99  |  66<H>  ----------------------------<  103<H>  4.2   |  17<L>  |  1.56<H>    Ca    9.3      2023 00:39  Phos  2.7       Mg     2.1         TPro  6.7  /  Alb  3.6  /  TBili  0.4  /  DBili  x   /  AST  16  /  ALT  25  /  AlkPhos  66  11-30    LIVER FUNCTIONS - ( 2023 00:39 )  Alb: 3.6 g/dL / Pro: 6.7 g/dL / ALK PHOS: 66 U/L / ALT: 25 U/L / AST: 16 U/L / GGT: x           PT/INR - ( 2023 00:39 )   PT: 13.3 sec;   INR: 1.22 ratio    PTT - ( 2023 00:39 )  PTT:75.6 sec    Urinalysis Basic - ( 2023 00:39 )    Color: x / Appearance: x / SG: x / pH: x  Gluc: 103 mg/dL / Ketone: x  / Bili: x / Urobili: x   Blood: x / Protein: x / Nitrite: x   Leuk Esterase: x / RBC: x / WBC x   Sq Epi: x / Non Sq Epi: x / Bacteria: x

## 2023-11-30 NOTE — PROGRESS NOTE ADULT - ASSESSMENT
57 yo M with PMHx of HTN, CAD w/ 1 stent in 2009, ICH (2008) presented on 11/1 with abn ekg. Patient presented to [facility] where he was found to have STEMI, recommended to get cath however patient did not want to get it there so he left and came here.   EKG here with ST depression in lateral leads and elevation in anterior leads   Prior to [facility] found to be tachycardic, dyspneic, intubated   [facility] 11/1 with chronic total occlusion of LAD and RCA, with elevated RA and PA pressures  TTE 11/1 with severely decreased EF 32%, s/p IABP 11/1    RVP (11/1) Positive for COVID19  RVP (11/10) Negative  BCx (11/2, 11/4) NGTD  ETT Culture (11/2) NGTD  MRSA/MSSA PCR (11/2, 11/10) Negative  UA (11/10) 1 WBC    CXR (11/10) Clear Lungs  TTE (11/2) Left ventricular thrombus.    #Enterococcus Bacteremia, Leukocytosis, Pre-Heart Transplant Evaluation Serologies  Concern for either central line or urinary source  CT A/P Noncontrast (11/18) No acute process  Given mixed cultures with Staph epi and Enterococcus faecalis would continue Vancomycin   Patient's penicillin allergy is questionable and patient does not recall reaction.   -- susceptibility of Enterococcus faecalis shows Vanco and Ampi sensitive  --Follow up on repeat blood cultures from 11/18 are negative  -- Completing ten-parisi of IV Vancomycin last dose received on 11/27    Given evidence of clearing of bacteremia could move forward from an ID perspective to pursue advanced cardiac therapies- listed for transplant status 2E      #Encounter to Vaccinate Patient  COVID19: COVID19 Infection This Admission. Would consider Vaccination in ~3 months  Influenza: declined  Pneumococcal: Would benefit from PCV20  HAV: received first dose 11/24  HBV: received first dose Heplisav 11/24  MMR: Not Mumps Immune, if >1 month until transplant would consider MMR dose  Varicella: Immune  Shingles: recommend Shingrix series  Tdap: received Tdap booster this admission      Chao Peters MD  Can be called via Teams  After 5pm/weekends [phone]     57 yo M with PMHx of HTN, CAD w/ 1 stent in 2009, ICH (2008) presented on 11/1 with abn ekg. Patient presented to [facility] where he was found to have STEMI, recommended to get cath however patient did not want to get it there so he left and came here.   EKG here with ST depression in lateral leads and elevation in anterior leads   Prior to [facility] found to be tachycardic, dyspneic, intubated   [facility] 11/1 with chronic total occlusion of LAD and RCA, with elevated RA and PA pressures  TTE 11/1 with severely decreased EF 32%, s/p IABP 11/1    RVP (11/1) Positive for COVID19  RVP (11/10) Negative  BCx (11/2, 11/4) NGTD  ETT Culture (11/2) NGTD  MRSA/MSSA PCR (11/2, 11/10) Negative  UA (11/10) 1 WBC    CXR (11/10) Clear Lungs  TTE (11/2) Left ventricular thrombus.    #Enterococcus Bacteremia, Leukocytosis, Pre-Heart Transplant Evaluation Serologies  Concern for either central line or urinary source  CT A/P Noncontrast (11/18) No acute process  Given mixed cultures with Staph epi and Enterococcus faecalis would continue Vancomycin   Patient's penicillin allergy is questionable and patient does not recall reaction.   -- susceptibility of Enterococcus faecalis shows Vanco and Ampi sensitive  --Follow up on repeat blood cultures from 11/18 are negative  -- Completing ten-days of IV Vancomycin last dose received on 11/27    Given evidence of clearing of bacteremia could move forward from an ID perspective to pursue advanced cardiac therapies- listed for transplant status 2E      #Encounter to Vaccinate Patient  COVID19: COVID19 Infection This Admission. Would consider Vaccination in ~3 months  Influenza: declined  Pneumococcal: Would benefit from PCV20  HAV: received first dose 11/24  HBV: received first dose Heplisav 11/24  MMR: Not Mumps Immune, if >1 month until transplant would consider MMR dose  Varicella: Immune  Shingles: recommend Shingrix series  Tdap: received Tdap booster this admission      Chao Peters MD  Can be called via Teams  After 5pm/weekends [phone]

## 2023-11-30 NOTE — PROGRESS NOTE ADULT - ATTENDING COMMENTS
Scr returned to baseline today    Twice recently had incorrect increases- very transient    Hyponatremia- 129- No D5 diluent sources   Eating better now    should cont. to improve

## 2023-11-30 NOTE — PROGRESS NOTE ADULT - ASSESSMENT
57 yo male h/o htn, cad s/p pci, ICH, here with NSTEMI  s/p intubation and cath. now in CCU    NSTEMI  s/p  cath  cath results noted. multi vessel dz., CTSx f/u.   hep gtt  pt with vtach/fib arrest again on 11/8. s/p ACLS and ROSC.   now extubated  IABP in place  mngt as per CCU  plan for transplant vs LVAD as per HF and transplant team  transplant w/u ongoing.   s/p colonoscopy 11/17. normal  now listed for transplant as of 11/22    LV thrombus   hep gtt    acute on chronic systolic heart failure  heart failure team f/u    pna  recently diagnosed outpt  iv abx now stopped  f/u cult   id following     covid  supportive care     h/o ICH  resolved    agitation  psy consult f/u    bacteremia  id following  iv abx  f/u repeat cult - neg    vomiting and abd pail  ileus on CT  bowel regimen  gi f/u  +bm    now resolved.     IABP malposition on CT  mngt as per ccu. iabp adjusted    mngt as per CCU team          Advanced care planning was discussed with patient and family.  Advanced care planning forms were reviewed and discussed as appropriate.  Differential diagnosis and plan of care discussed with patient after the evaluation.   Pain assessed and judicious use of narcotics when appropriate was discussed.  Importance of Fall prevention discussed.  Counseling on Smoking and Alcohol cessation was offered when appropriate.  Counseling on Diet, exercise, and medication compliance was done.       Approx 75 minutes spent.

## 2023-11-30 NOTE — PROGRESS NOTE ADULT - SUBJECTIVE AND OBJECTIVE BOX
Behavioral Cardiology Progress Note     History of Present Illness: Mr. Hardy is a 58 year-old man with history of HTN, CAD (1 stent in 2009), ICH (2008) presented on 11/1 with abnormal EKG. Patient presented to [facility] where he was found to have STEMI, recommended to get cath however patient did not want treatment at OSH so left and came to [facility]. EKG here with ST depression in lateral leads and elevation in anterior leads. Prior to C found to be tachycardic, dyspneic, intubated. LHC 11/1 with chronic total occlusion of LAD and RCA, with elevated RA and PA pressures. TTE 11/1 with severely decreased EF 32%, s/p IABP 11/1.     Social history: Mr. Martin Hardy is a 58-year-old [place] American,  male with three children, including two sons and three daughters. 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 His wife, Brynn, works as an  in a public high school in [place]. Mr. Hardy reported three close friendships with men who live in [place]. Notably, one of these men, Dr. Brody Le, is Mr. Hardy’s healthcare proxy and PCP. Mr. Hardy also reported that these friends feel like family due to their closeness. Currently, Mr. Hardy lives in a condominium apartment in [place] with his wife and children. 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 Mr. Hardy noted his parents live in [place] as does one brother and one sister. Mr. Hardy has another sister, diagnosed with bipolar disorder, who lives in [place]. He described feeling distant from his siblings, as they lack a close relationship.      Substance use:    •	Tobacco: Denies current and past tobacco use.    •	Alcohol: Denies current and past alcohol use.   •	Drug: Denies current and past drug use.      Past psychiatric history: Mr. Hardy denied any history of self-harm and suicidal ideation, plan, or attempt. He also denied any history of violent thoughts or behaviors. Denies history of outpatient treatment with a therapist or psychiatrist. With this said, he reported a history of odd thinking related, health-related anxiety. For example, Mr. Hardy reported meeting with a physician about 12 years ago because he feared having a brain tumor. He believed that the presence of a brain tumor could explain his superior memory. Earlier this year, he revealed anxiety to his PCP, who prescribed Mr. Hardy Lorazepam (0.5mg PRN). Per medical chart, Mr. Hardy filled his prescription three separate times in the past year. He reported concern about dependency, given Lorazepam’s status as a controlled medication, and desire to avoid substance abuse.      Psychological assessment: Continues to experience hospital related stress due to lack of privacy, dependence on staff due to bedrest, and sense of having no control. At times finds the situation very difficult but reports he is trying to focus on his family and future wellbeing. Additionally, he has been engaging in work related activities online which provides some sense of autonomy. Discussed stress management strategies such as emotional awareness, reframing, and acceptance. Slept well last night. Appetite good. Denies current symptoms of anxiety. Denies depressive symptoms. Receptive to supportive therapy and CBT strategies.    Together with patient's RN,(Aru), met with patient to provide validation of feelings and review of strategies for managing stress of hospitalization.      Mental Status Exam: Seen lying in bed on IABP on CICU. Awake, alert. Oriented x4. Well related. Maintained good eye contact. Speech was normal volume, rate, tone. Mood was neutral and affect was euthymic. Thought process goal directed, content focused on current health status. Denies s/i. Insight into disease adequate. Immediate judgement good.        Dx: Adjustment Disorder with anxiety    No absolute psychological contraindications for pursuing heart transplant/LVAD with following recommendations:     ·	Psychology will continue to follow for supportive therapy and CBT strategies to manage anxiety.  ·	Seen by psychiatry (11/8), will benefit from follow up.   ·	Maintain ongoing psychiatric follow-up.   ·	Holistic Nurse.   ·	Pet therapy if appropriate.   ·	LVAD and heart transplant support group information provided.       55 minutes spent on total patient encounter

## 2023-12-01 DIAGNOSIS — D69.6 THROMBOCYTOPENIA, UNSPECIFIED: ICD-10-CM

## 2023-12-01 LAB
ALBUMIN SERPL ELPH-MCNC: 3.4 G/DL — SIGNIFICANT CHANGE UP (ref 3.3–5)
ALBUMIN SERPL ELPH-MCNC: 3.4 G/DL — SIGNIFICANT CHANGE UP (ref 3.3–5)
ALP SERPL-CCNC: 62 U/L — SIGNIFICANT CHANGE UP (ref 40–120)
ALP SERPL-CCNC: 62 U/L — SIGNIFICANT CHANGE UP (ref 40–120)
ALT FLD-CCNC: 32 U/L — SIGNIFICANT CHANGE UP (ref 10–45)
ALT FLD-CCNC: 32 U/L — SIGNIFICANT CHANGE UP (ref 10–45)
ANION GAP SERPL CALC-SCNC: 12 MMOL/L — SIGNIFICANT CHANGE UP (ref 5–17)
ANION GAP SERPL CALC-SCNC: 12 MMOL/L — SIGNIFICANT CHANGE UP (ref 5–17)
APTT BLD: 49.5 SEC — HIGH (ref 24.5–35.6)
APTT BLD: 49.5 SEC — HIGH (ref 24.5–35.6)
APTT BLD: 65.1 SEC — HIGH (ref 24.5–35.6)
APTT BLD: 65.1 SEC — HIGH (ref 24.5–35.6)
AST SERPL-CCNC: 22 U/L — SIGNIFICANT CHANGE UP (ref 10–40)
AST SERPL-CCNC: 22 U/L — SIGNIFICANT CHANGE UP (ref 10–40)
BASOPHILS # BLD AUTO: 0.03 K/UL — SIGNIFICANT CHANGE UP (ref 0–0.2)
BASOPHILS # BLD AUTO: 0.03 K/UL — SIGNIFICANT CHANGE UP (ref 0–0.2)
BASOPHILS NFR BLD AUTO: 0.4 % — SIGNIFICANT CHANGE UP (ref 0–2)
BASOPHILS NFR BLD AUTO: 0.4 % — SIGNIFICANT CHANGE UP (ref 0–2)
BILIRUB SERPL-MCNC: 0.3 MG/DL — SIGNIFICANT CHANGE UP (ref 0.2–1.2)
BILIRUB SERPL-MCNC: 0.3 MG/DL — SIGNIFICANT CHANGE UP (ref 0.2–1.2)
BUN SERPL-MCNC: 44 MG/DL — HIGH (ref 7–23)
BUN SERPL-MCNC: 44 MG/DL — HIGH (ref 7–23)
CALCIUM SERPL-MCNC: 9.2 MG/DL — SIGNIFICANT CHANGE UP (ref 8.4–10.5)
CALCIUM SERPL-MCNC: 9.2 MG/DL — SIGNIFICANT CHANGE UP (ref 8.4–10.5)
CHLORIDE SERPL-SCNC: 101 MMOL/L — SIGNIFICANT CHANGE UP (ref 96–108)
CHLORIDE SERPL-SCNC: 101 MMOL/L — SIGNIFICANT CHANGE UP (ref 96–108)
CO2 SERPL-SCNC: 17 MMOL/L — LOW (ref 22–31)
CO2 SERPL-SCNC: 17 MMOL/L — LOW (ref 22–31)
CREAT SERPL-MCNC: 1.48 MG/DL — HIGH (ref 0.5–1.3)
CREAT SERPL-MCNC: 1.48 MG/DL — HIGH (ref 0.5–1.3)
EGFR: 54 ML/MIN/1.73M2 — LOW
EGFR: 54 ML/MIN/1.73M2 — LOW
EOSINOPHIL # BLD AUTO: 0.34 K/UL — SIGNIFICANT CHANGE UP (ref 0–0.5)
EOSINOPHIL # BLD AUTO: 0.34 K/UL — SIGNIFICANT CHANGE UP (ref 0–0.5)
EOSINOPHIL NFR BLD AUTO: 4.7 % — SIGNIFICANT CHANGE UP (ref 0–6)
EOSINOPHIL NFR BLD AUTO: 4.7 % — SIGNIFICANT CHANGE UP (ref 0–6)
GLUCOSE BLDC GLUCOMTR-MCNC: 108 MG/DL — HIGH (ref 70–99)
GLUCOSE BLDC GLUCOMTR-MCNC: 108 MG/DL — HIGH (ref 70–99)
GLUCOSE BLDC GLUCOMTR-MCNC: 114 MG/DL — HIGH (ref 70–99)
GLUCOSE BLDC GLUCOMTR-MCNC: 114 MG/DL — HIGH (ref 70–99)
GLUCOSE BLDC GLUCOMTR-MCNC: 124 MG/DL — HIGH (ref 70–99)
GLUCOSE BLDC GLUCOMTR-MCNC: 124 MG/DL — HIGH (ref 70–99)
GLUCOSE BLDC GLUCOMTR-MCNC: 231 MG/DL — HIGH (ref 70–99)
GLUCOSE BLDC GLUCOMTR-MCNC: 231 MG/DL — HIGH (ref 70–99)
GLUCOSE SERPL-MCNC: 106 MG/DL — HIGH (ref 70–99)
GLUCOSE SERPL-MCNC: 106 MG/DL — HIGH (ref 70–99)
HCT VFR BLD CALC: 31.7 % — LOW (ref 39–50)
HCT VFR BLD CALC: 31.7 % — LOW (ref 39–50)
HGB BLD-MCNC: 10.5 G/DL — LOW (ref 13–17)
HGB BLD-MCNC: 10.5 G/DL — LOW (ref 13–17)
IMM GRANULOCYTES NFR BLD AUTO: 0.3 % — SIGNIFICANT CHANGE UP (ref 0–0.9)
IMM GRANULOCYTES NFR BLD AUTO: 0.3 % — SIGNIFICANT CHANGE UP (ref 0–0.9)
LACTATE SERPL-SCNC: 0.5 MMOL/L — SIGNIFICANT CHANGE UP (ref 0.5–2)
LACTATE SERPL-SCNC: 0.5 MMOL/L — SIGNIFICANT CHANGE UP (ref 0.5–2)
LYMPHOCYTES # BLD AUTO: 1.39 K/UL — SIGNIFICANT CHANGE UP (ref 1–3.3)
LYMPHOCYTES # BLD AUTO: 1.39 K/UL — SIGNIFICANT CHANGE UP (ref 1–3.3)
LYMPHOCYTES # BLD AUTO: 19.4 % — SIGNIFICANT CHANGE UP (ref 13–44)
LYMPHOCYTES # BLD AUTO: 19.4 % — SIGNIFICANT CHANGE UP (ref 13–44)
MAGNESIUM SERPL-MCNC: 2.1 MG/DL — SIGNIFICANT CHANGE UP (ref 1.6–2.6)
MAGNESIUM SERPL-MCNC: 2.1 MG/DL — SIGNIFICANT CHANGE UP (ref 1.6–2.6)
MCHC RBC-ENTMCNC: 29.3 PG — SIGNIFICANT CHANGE UP (ref 27–34)
MCHC RBC-ENTMCNC: 29.3 PG — SIGNIFICANT CHANGE UP (ref 27–34)
MCHC RBC-ENTMCNC: 33.1 GM/DL — SIGNIFICANT CHANGE UP (ref 32–36)
MCHC RBC-ENTMCNC: 33.1 GM/DL — SIGNIFICANT CHANGE UP (ref 32–36)
MCV RBC AUTO: 88.5 FL — SIGNIFICANT CHANGE UP (ref 80–100)
MCV RBC AUTO: 88.5 FL — SIGNIFICANT CHANGE UP (ref 80–100)
MONOCYTES # BLD AUTO: 0.57 K/UL — SIGNIFICANT CHANGE UP (ref 0–0.9)
MONOCYTES # BLD AUTO: 0.57 K/UL — SIGNIFICANT CHANGE UP (ref 0–0.9)
MONOCYTES NFR BLD AUTO: 7.9 % — SIGNIFICANT CHANGE UP (ref 2–14)
MONOCYTES NFR BLD AUTO: 7.9 % — SIGNIFICANT CHANGE UP (ref 2–14)
NEUTROPHILS # BLD AUTO: 4.83 K/UL — SIGNIFICANT CHANGE UP (ref 1.8–7.4)
NEUTROPHILS # BLD AUTO: 4.83 K/UL — SIGNIFICANT CHANGE UP (ref 1.8–7.4)
NEUTROPHILS NFR BLD AUTO: 67.3 % — SIGNIFICANT CHANGE UP (ref 43–77)
NEUTROPHILS NFR BLD AUTO: 67.3 % — SIGNIFICANT CHANGE UP (ref 43–77)
NRBC # BLD: 0 /100 WBCS — SIGNIFICANT CHANGE UP (ref 0–0)
NRBC # BLD: 0 /100 WBCS — SIGNIFICANT CHANGE UP (ref 0–0)
PHOSPHATE SERPL-MCNC: 2.9 MG/DL — SIGNIFICANT CHANGE UP (ref 2.5–4.5)
PHOSPHATE SERPL-MCNC: 2.9 MG/DL — SIGNIFICANT CHANGE UP (ref 2.5–4.5)
PLATELET # BLD AUTO: 134 K/UL — LOW (ref 150–400)
PLATELET # BLD AUTO: 134 K/UL — LOW (ref 150–400)
POTASSIUM SERPL-MCNC: 3.6 MMOL/L — SIGNIFICANT CHANGE UP (ref 3.5–5.3)
POTASSIUM SERPL-MCNC: 3.6 MMOL/L — SIGNIFICANT CHANGE UP (ref 3.5–5.3)
POTASSIUM SERPL-SCNC: 3.6 MMOL/L — SIGNIFICANT CHANGE UP (ref 3.5–5.3)
POTASSIUM SERPL-SCNC: 3.6 MMOL/L — SIGNIFICANT CHANGE UP (ref 3.5–5.3)
PROT SERPL-MCNC: 6.4 G/DL — SIGNIFICANT CHANGE UP (ref 6–8.3)
PROT SERPL-MCNC: 6.4 G/DL — SIGNIFICANT CHANGE UP (ref 6–8.3)
RBC # BLD: 3.58 M/UL — LOW (ref 4.2–5.8)
RBC # BLD: 3.58 M/UL — LOW (ref 4.2–5.8)
RBC # FLD: 14.6 % — HIGH (ref 10.3–14.5)
RBC # FLD: 14.6 % — HIGH (ref 10.3–14.5)
SODIUM SERPL-SCNC: 130 MMOL/L — LOW (ref 135–145)
SODIUM SERPL-SCNC: 130 MMOL/L — LOW (ref 135–145)
WBC # BLD: 7.18 K/UL — SIGNIFICANT CHANGE UP (ref 3.8–10.5)
WBC # BLD: 7.18 K/UL — SIGNIFICANT CHANGE UP (ref 3.8–10.5)
WBC # FLD AUTO: 7.18 K/UL — SIGNIFICANT CHANGE UP (ref 3.8–10.5)
WBC # FLD AUTO: 7.18 K/UL — SIGNIFICANT CHANGE UP (ref 3.8–10.5)

## 2023-12-01 PROCEDURE — 99232 SBSQ HOSP IP/OBS MODERATE 35: CPT | Mod: GC

## 2023-12-01 PROCEDURE — 99291 CRITICAL CARE FIRST HOUR: CPT

## 2023-12-01 PROCEDURE — 99292 CRITICAL CARE ADDL 30 MIN: CPT

## 2023-12-01 PROCEDURE — 93010 ELECTROCARDIOGRAM REPORT: CPT

## 2023-12-01 PROCEDURE — 71045 X-RAY EXAM CHEST 1 VIEW: CPT | Mod: 26

## 2023-12-01 PROCEDURE — 99418 PROLNG IP/OBS E/M EA 15 MIN: CPT

## 2023-12-01 PROCEDURE — 99233 SBSQ HOSP IP/OBS HIGH 50: CPT

## 2023-12-01 PROCEDURE — 99291 CRITICAL CARE FIRST HOUR: CPT | Mod: 25

## 2023-12-01 RX ORDER — POTASSIUM CHLORIDE 20 MEQ
40 PACKET (EA) ORAL ONCE
Refills: 0 | Status: COMPLETED | OUTPATIENT
Start: 2023-12-01 | End: 2023-12-01

## 2023-12-01 RX ADMIN — ISOSORBIDE DINITRATE 40 MILLIGRAM(S): 5 TABLET ORAL at 11:38

## 2023-12-01 RX ADMIN — Medication 100 MILLIGRAM(S): at 04:14

## 2023-12-01 RX ADMIN — AMIODARONE HYDROCHLORIDE 200 MILLIGRAM(S): 400 TABLET ORAL at 06:05

## 2023-12-01 RX ADMIN — Medication 100 MILLIGRAM(S): at 14:08

## 2023-12-01 RX ADMIN — ISOSORBIDE DINITRATE 40 MILLIGRAM(S): 5 TABLET ORAL at 06:05

## 2023-12-01 RX ADMIN — SENNA PLUS 2 TABLET(S): 8.6 TABLET ORAL at 22:26

## 2023-12-01 RX ADMIN — Medication 9 UNIT(S): at 12:31

## 2023-12-01 RX ADMIN — Medication 81 MILLIGRAM(S): at 11:38

## 2023-12-01 RX ADMIN — CARVEDILOL PHOSPHATE 25 MILLIGRAM(S): 80 CAPSULE, EXTENDED RELEASE ORAL at 06:05

## 2023-12-01 RX ADMIN — ISOSORBIDE DINITRATE 40 MILLIGRAM(S): 5 TABLET ORAL at 17:25

## 2023-12-01 RX ADMIN — Medication 9 UNIT(S): at 08:38

## 2023-12-01 RX ADMIN — POLYETHYLENE GLYCOL 3350 17 GRAM(S): 17 POWDER, FOR SOLUTION ORAL at 17:26

## 2023-12-01 RX ADMIN — ATORVASTATIN CALCIUM 80 MILLIGRAM(S): 80 TABLET, FILM COATED ORAL at 22:26

## 2023-12-01 RX ADMIN — Medication 0: at 17:26

## 2023-12-01 RX ADMIN — CARVEDILOL PHOSPHATE 25 MILLIGRAM(S): 80 CAPSULE, EXTENDED RELEASE ORAL at 17:29

## 2023-12-01 RX ADMIN — Medication 40 MILLIEQUIVALENT(S): at 06:05

## 2023-12-01 RX ADMIN — BUDESONIDE AND FORMOTEROL FUMARATE DIHYDRATE 2 PUFF(S): 160; 4.5 AEROSOL RESPIRATORY (INHALATION) at 20:20

## 2023-12-01 RX ADMIN — ZOSTER VACCINE RECOMBINANT, ADJUVANTED 0.5 MILLILITER(S): KIT at 06:14

## 2023-12-01 RX ADMIN — INSULIN GLARGINE 12 UNIT(S): 100 INJECTION, SOLUTION SUBCUTANEOUS at 22:32

## 2023-12-01 RX ADMIN — Medication 2: at 12:31

## 2023-12-01 RX ADMIN — HEPARIN SODIUM 14 UNIT(S)/HR: 5000 INJECTION INTRAVENOUS; SUBCUTANEOUS at 08:54

## 2023-12-01 RX ADMIN — Medication 9 UNIT(S): at 17:26

## 2023-12-01 NOTE — PROGRESS NOTE ADULT - SUBJECTIVE AND OBJECTIVE BOX
[facility] Division of Kidney Diseases & Hypertension  FOLLOW UP NOTE  [phone]--------------------------------------------------------------------------------  Chief Complaint:Non-ST elevation myocardial infarction (NSTEMI)        24 hour events/subjective:  Pt was seen and examined at the bedside. No acute overnight events. No fresh complaints.   Pt denies SOB/ Constipation/ Diarrhea/ Nausea/ Vomiting/ abdominal pain/ chest pain/ tingling/ numbness.         PAST HISTORY  --------------------------------------------------------------------------------  No significant changes to PMH, PSH, FHx, SHx, unless otherwise noted    ALLERGIES & MEDICATIONS  --------------------------------------------------------------------------------  Allergies    penicillins (Unknown)    Intolerances      Standing Inpatient Medications  aMIOdarone    Tablet   Oral   aMIOdarone    Tablet 200 milliGRAM(s) Oral daily  aspirin  chewable 81 milliGRAM(s) Oral daily  atorvastatin 80 milliGRAM(s) Oral at bedtime  budesonide  80 MICROgram(s)/formoterol 4.5 MICROgram(s) Inhaler 2 Puff(s) Inhalation two times a day  carvedilol 25 milliGRAM(s) Oral every 12 hours  chlorhexidine 2% Cloths 1 Application(s) Topical daily  heparin  Infusion 1300 Unit(s)/Hr IV Continuous <Continuous>  hydrALAZINE 100 milliGRAM(s) Oral three times a day  insulin glargine Injectable (LANTUS) 12 Unit(s) SubCutaneous at bedtime  insulin lispro (ADMELOG) corrective regimen sliding scale   SubCutaneous at bedtime  insulin lispro (ADMELOG) corrective regimen sliding scale   SubCutaneous three times a day before meals  insulin lispro Injectable (ADMELOG) 9 Unit(s) SubCutaneous three times a day before meals  isosorbide   dinitrate Tablet (ISORDIL) 40 milliGRAM(s) Oral three times a day  polyethylene glycol 3350 17 Gram(s) Oral two times a day  senna 2 Tablet(s) Oral at bedtime    PRN Inpatient Medications  hydrOXYzine hydrochloride 25 milliGRAM(s) Oral two times a day PRN      REVIEW OF SYSTEMS  --------------------------------------------------------------------------------  as above.     VITALS/PHYSICAL EXAM  --------------------------------------------------------------------------------  T(C): 36.7 (12-01-23 @ 07:00), Max: 36.8 (11-30-23 @ 14:00)  HR: 69 (12-01-23 @ 09:00) (62 - 80)  BP: --  RR: 15 (12-01-23 @ 09:00) (15 - 26)  SpO2: 97% (12-01-23 @ 09:00) (95% - 98%)  Wt(kg): --        11-30-23 @ 07:01  -  12-01-23 @ 07:00  --------------------------------------------------------  IN: 1145 mL / OUT: 2465 mL / NET: -1320 mL      Physical Exam:  Gen: NAD, well-appearing  HEENT: PERRL, supple neck, clear oropharynx  Pulm: CTA B/L  CV: S1S2; IABP +  Back: No spinal or CVA tenderness  Abd: +BS, soft, nontender/nondistended  : No suprapubic tenderness   Extremities: no bilateral LE edema noted.   Neuro: No focal deficits  Skin: Interval improvement in the non- blanching rash.   Vascular access: NA    LABS/STUDIES  --------------------------------------------------------------------------------              10.5   7.18  >-----------<  134      [12-01-23 @ 01:24]              31.7     130  |  101  |  44  ----------------------------<  106      [12-01-23 @ 01:24]  3.6   |  17  |  1.48        Ca     9.2     [12-01-23 @ 01:24]      Mg     2.1     [12-01-23 @ 01:24]      Phos  2.9     [12-01-23 @ 01:24]    TPro  6.4  /  Alb  3.4  /  TBili  0.3  /  DBili  x   /  AST  22  /  ALT  32  /  AlkPhos  62  [12-01-23 @ 01:24]    PT/INR: PT 13.3 , INR 1.22       [11-30-23 @ 00:39]  PTT: 65.1       [12-01-23 @ 08:20]      Creatinine Trend:  SCr 1.48 [12-01 @ 01:24]  SCr 1.56 [11-30 @ 00:39]  SCr 1.64 [11-29 @ 12:42]  SCr 1.96 [11-29 @ 05:37]  SCr 1.52 [11-28 @ 02:25]    Urinalysis - [12-01-23 @ 01:24]      Color  / Appearance  / SG  / pH       Gluc 106 / Ketone   / Bili  / Urobili        Blood  / Protein  / Leuk Est  / Nitrite       RBC  / WBC  / Hyaline  / Gran  / Sq Epi  / Non Sq Epi  / Bacteria     Urine Creatinine 100      [11-29-23 @ 15:43]  Urine Sodium 21      [11-29-23 @ 15:43]  Urine Potassium 28      [11-29-23 @ 15:43]  Urine Osmolality 715      [11-29-23 @ 15:43]

## 2023-12-01 NOTE — PROGRESS NOTE ADULT - ASSESSMENT
59 yo M with HFrEF (LVIDd 6.4 cm, LVEF 32%), CAD s/p PCI (2008), HTN, DMT2 (A1c 8.3%) and CVA s/p TPA (2018), recently treated for PNA who initially presented to [facility] via EMS after syncope reportedly requiring defibrilation. Treated for ACS but left AMA to come to [facility]. On arrival ECG with ST depression in lateral leads. Intubated 11/1 for respiratory failure. Found to have COVID. L/RHC 11/1revealing  of LAD and RCA, elevated filling pressures and CI of 1.5 prompting placement of IABP. Extubated 11/3. IABP was weaned to off 11/7, then the following day on 11/8, developed PMVT cardiac arrest with prompt CPR and defibrillation, started on IV Lidocaine and Amio. IABP ultimately replaced on 11/9 for worsening hypotension. TTE 11/11 revealing LV thrombus.     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 AT evaluation launched 11/10, he is ABO A. He was discussed during TSC on 11/16 and was approved for listing, however was found to be Bacteremic with 11/17 Blc Cx growing mixed cultures Staph epi and Enterococcus faecalis and started on IV Vanco. Repeat cultures from 11/18 reveal NGTD and therefore was cleared by ID for listing.     He appears adequately supported on IABP at 1:1, electrically quiescent on oral Amiodarone. Cr is improving with holding diuretics. Labs otherwise notable for worsening thrombocytopenia. Awaiting suitable donor.       Cardiac Studies  11/21/23 TTE: limited unable to rule out endocarditis, technically difficult study  11/1123 TTE: LVEF 22% (global), LV thrombus, normal RV size and function, mild MR, trace TR  11/1/23  LHC showed pLAD 70 % stenosis in the ostium portion of the segment and 100 % in-stent restenosis and in mRCA, 100 % stenosis.   11/1/23 RHC; RA 23 Vw 24, PA 52/33/40, PCWP 30 Vw 33, AO 85/66/73, PA sat 54.4%, CO/CI (F) 2.96/1.44, IABP was placed during the cath through R common femoral artery.   11/1/23 TTE: LVIDd 6.4cm , LVEF 32%, regional WMA, grade II DD,  TAPSE 1.7cm, mld MR, trace TR,     Bedside hemodynamics  11/25 IABP 1:1: CVP 3, PA  33-36/12 mean PA 20-23 PCWP 15-16, MVO2 72.0%, CO/CI 6.2/2.8,   dsc? (PVR 0.8-1.1 medina calculated by me)   11/3: IABP 1:1 RA 5; PA 27/12/18; CO/CI 5.6/2.6; MAP 90-100s.  11/4/23 IABP 1:3 CVP 4 PA 37/18 SvO2 83.8 CO/CI 11.2 CI 5.1  (in the setting of fever to 38.3C)  11/5 IABP 1:1 CVP 3 PA 48/27 SvO2 78.4% CO 8.2 CI 3.7   11/1 (IABP 1:1): CVP 1, PA 33/5/16, MvO2 74.3%

## 2023-12-01 NOTE — PROGRESS NOTE ADULT - CRITICAL CARE ATTENDING COMMENT
58 male with HTN, CAD (s/p PCI 2008), HFrEF, CVA 2018, and T2DM a/w cardiogenic shock requiring IABP.         Neuro: A/O, no active issues  Resp: No O2 requirements  CVS: Well supported on IABP. Tolerating coreg, hdzn/isordil. Therapeutic on hep gtt, c/w amio for VT. Listed status 2. c/w asa and lipitor.  GI: Abd pain and distension likely due to constipation, improved now after aggressive bowel regimen  Renal: ARIC resolved, hyponatremia improving  ID: Monitor off abx, mild leukocytosis resolved.  Endo: Monitor sugars, titrate insulin  Heme: hep gtt, plt stable  R fem IABP 11/20.

## 2023-12-01 NOTE — PROGRESS NOTE ADULT - NS ATTEND AMEND GEN_ALL_CORE FT
Pt denies overt HF symptoms.  No acute events overnight.   Remains on IABP support and afterload reducing agents.   Appears euvolemic on the physical exam.  Pt is struggling with his prolonged hospitalization. Needs continuos follow up with behavioral cardiology.

## 2023-12-01 NOTE — PROGRESS NOTE ADULT - ASSESSMENT
57 yo male h/o htn, cad s/p pci, ICH, here with NSTEMI  s/p intubation and cath. now in CCU    NSTEMI  s/p  cath  cath results noted. multi vessel dz., CTSx f/u.   hep gtt  pt with vtach/fib arrest again on 11/8. s/p ACLS and ROSC.   now extubated  IABP in place  mngt as per CCU  plan for transplant as per HF and transplant team  transplant w/u ongoing.   s/p colonoscopy 11/17. normal  now listed for transplant as of 11/22    LV thrombus   hep gtt    acute on chronic systolic heart failure  heart failure team f/u    pna  recently diagnosed outpt  iv abx now stopped  f/u cult   id following     covid  supportive care     h/o ICH  resolved    agitation  psy consult f/u    bacteremia  id following  iv abx  f/u repeat cult - neg    vomiting and abd pain  ileus on CT  bowel regimen  gi f/u  +bm    now resolved.     IABP malposition on CT  mngt as per ccu. iabp adjusted    mngt as per CCU team          Advanced care planning was discussed with patient and family.  Advanced care planning forms were reviewed and discussed as appropriate.  Differential diagnosis and plan of care discussed with patient after the evaluation.   Pain assessed and judicious use of narcotics when appropriate was discussed.  Importance of Fall prevention discussed.  Counseling on Smoking and Alcohol cessation was offered when appropriate.  Counseling on Diet, exercise, and medication compliance was done.       Approx 75 minutes spent.

## 2023-12-01 NOTE — PROGRESS NOTE ADULT - PROBLEM SELECTOR PLAN 2
Hyponatremia: developed during this hospital course. He is not symptomatic. Urine Osm 471 and Urine Na 88.   Given 150cc bolus of 3% saline on 11/24. Na today 130 and pt remained asymptomatic. His nausea has improved and he was on liquid diet. Today he was transitioned to regular diet.     PLAN:  Pt is on Urena and pt's hyponatremia is improving. Today 130 and pt is eating solid diet.   Fluid restriction till the hyponatremia resolves. Target < 1-1.5L a day.   rest of the management as per the primary team. Hyponatremia: developed during this hospital course. He is not symptomatic. Urine Osm 471 and Urine Na 88.   Given 150cc bolus of 3% saline on 11/24. Na today 130 and pt remained asymptomatic. His nausea has improved and he was on liquid diet. Today he was transitioned to regular diet. Urena was Dced on 11/30/23    PLAN:  Pt  hyponatremia is improving. Today 130 and pt is eating solid diet.   Fluid restriction till the hyponatremia resolves. Target < 1-1.5L a day.   rest of the management as per the primary team.

## 2023-12-01 NOTE — PROGRESS NOTE ADULT - SUBJECTIVE AND OBJECTIVE BOX
DEVORAH VALENCIA  MRN-[id number]  Patient is a 58y old  Male who presents with a chief complaint of pre advanced cardiac therapies evaluation (01 Dec 2023 15:06)    HPI:  pt seen and approx 1:30 pm  in [facility]    57yo M w/ hx HTN, CAD w/ 1 stent in , ICH () presenting with abn ekg. Patient presented to [facility] where he was found to have STEMI, recommended to get cath however patient did not want to get it there so it left and came here.  Patient initially had cough, congestion, fever, was placed on antibiotics on .  Started feeling nauseous and had a presyncopal event after which he presented to ED last night.  Had chest pain as well.  Chest pain is midsternal.  Not currently having chest pain.  Received 4 aspirin 30 min pta. (2023 15:11)    Hospital Course:  24 HOUR EVENTS:  REVIEW OF SYSTEMS:    CONSTITUTIONAL: No weakness, fevers or chills  EYES/ENT: No visual changes;  No vertigo or throat pain   NECK: No pain or stiffness  RESPIRATORY: No cough, wheezing, hemoptysis; No shortness of breath  CARDIOVASCULAR: No chest pain or palpitations  GASTROINTESTINAL: No abdominal or epigastric pain. No nausea, vomiting, or hematemesis; No diarrhea or constipation. No melena or hematochezia.  GENITOURINARY: No dysuria, frequency or hematuria  NEUROLOGICAL: No numbness or weakness  SKIN: No itching, rashes    ICU Vital Signs Last 24 Hrs  T(C): 36.9 (01 Dec 2023 15:00), Max: 36.9 (01 Dec 2023 15:00)  T(F): 98.4 (01 Dec 2023 15:00), Max: 98.4 (01 Dec 2023 15:00)  HR: 77 (01 Dec 2023 18:00) (62 - 79)  BP: --  BP(mean): --  ABP: --  ABP(mean): --  RR: 16 (01 Dec 2023 18:00) (14 - 21)  SpO2: 96% (01 Dec 2023 18:00) (95% - 98%)    O2 Parameters below as of 01 Dec 2023 18:00  Patient On (Oxygen Delivery Method): room air        CVP(mm Hg): --  CO: --  CI: --  PA: --  PA(mean): --  PA(direct): --  PCWP: --  LA: --  RA: --  SVR: --  SVRI: --  PVR: --  PVRI: --  I&O's Summary    2023 07:  -  01 Dec 2023 07:00  --------------------------------------------------------  IN: 1159 mL / OUT: 2465 mL / NET: -1306 mL    01 Dec 2023 07:01  -  01 Dec 2023 19:40  --------------------------------------------------------  IN: 514 mL / OUT: 950 mL / NET: -436 mL      CAPILLARY BLOOD GLUCOSE    CAPILLARY BLOOD GLUCOSE      POCT Blood Glucose.: 108 mg/dL (01 Dec 2023 17:24)    PHYSICAL EXAM:  GENERAL: No acute distress, well-developed  HEAD:  Atraumatic, Normocephalic  EYES: EOMI, PERRLA, conjunctiva and sclera clear  NECK: Supple, no lymphadenopathy, no JVD  CHEST/LUNG: CTAB; No wheezes, rales, or rhonchi  HEART: Regular rate and rhythm. Normal S1/S2. No murmurs, rubs, or gallops  ABDOMEN: Soft, non-tender, non-distended; normal bowel sounds, no organomegaly  EXTREMITIES:  2+ peripheral pulses b/l, No clubbing, cyanosis, or edema  NEUROLOGY: A&O x 3, no focal deficits  SKIN: No rashes or lesions  ============================I/O===========================   I&O's Detail    2023 07:01  -  01 Dec 2023 07:00  --------------------------------------------------------  IN:    Heparin: 279 mL    Oral Fluid: 880 mL  Total IN: 1159 mL    OUT:    Voided (mL): 2465 mL  Total OUT: 2465 mL    Total NET: -1306 mL      01 Dec 2023 07:01  -  01 Dec 2023 19:40  --------------------------------------------------------  IN:    Heparin: 154 mL    Oral Fluid: 360 mL  Total IN: 514 mL    OUT:    Voided (mL): 950 mL  Total OUT: 950 mL    Total NET: -436 mL      ============================ LABS =========================                        10.5   7.18  )-----------( 134      ( 01 Dec 2023 01:24 )             31.7         130<L>  |  101  |  44<H>  ----------------------------<  106<H>  3.6   |  17<L>  |  1.48<H>    Ca    9.2      01 Dec 2023 01:24  Phos  2.9       Mg     2.1         TPro  6.4  /  Alb  3.4  /  TBili  0.3  /  DBili  x   /  AST  22  /  ALT  32  /  AlkPhos  62      LIVER FUNCTIONS - ( 01 Dec 2023 01:24 )  Alb: 3.4 g/dL / Pro: 6.4 g/dL / ALK PHOS: 62 U/L / ALT: 32 U/L / AST: 22 U/L / GGT: x           PT/INR - ( 2023 00:39 )   PT: 13.3 sec;   INR: 1.22 ratio         PTT - ( 01 Dec 2023 08:20 )  PTT:65.1 sec    Lactate, Blood: 0.5 mmol/L (23 @ 01:25)  Lactate, Blood: 1.9 mmol/L (23 @ 05:37)    Urinalysis Basic - ( 01 Dec 2023 01:24 )    Color: x / Appearance: x / SG: x / pH: x  Gluc: 106 mg/dL / Ketone: x  / Bili: x / Urobili: x   Blood: x / Protein: x / Nitrite: x   Leuk Esterase: x / RBC: x / WBC x   Sq Epi: x / Non Sq Epi: x / Bacteria: x      ======================Micro/Rad/Cardio=================  Telemtry: Reviewed   EKG: Reviewed  CXR: Reviewed  Culture: Reviewed   Echo:   Cath: Cardiac Cath Lab - Adult:   [facility]  Department of Cardiology  [address] [place] [address]  [phone]  Cath Lab Report -- Comprehensive Report  Patient: LD VALENCIA  Study date: 2017  Account number: [id number]  MR number: [id number]  : 1965  Gender: Male  Race: W  Case Physician(s):  Jairon Holguin M.D.  Referring Physician:  Luc Lynn M.D.  INDICATIONS: Unstable angina - CCS4.  HISTORY: The patient has a history of coronary artery disease. The patient  hashypertension and medication-treated dyslipidemia.  PROCEDURE:  --  Left heart catheterization with ventriculography.  --  Left coronary angiography.  --  Right coronary angiography.  TECHNIQUE: The risks and alternatives of the procedures and conscious  sedation were explained to the patient and informed consent was obtained.  Cardiac catheterization performed electively.  Local anesthetic given. Right radial artery access. A 6FR PRELUDE KIT was  inserted in the vessel. Left heart catheterization. Ventriculography was  performed. A 5FR FR4.0 EXPO catheter was utilized. Left coronary artery  angiography. The vessel was injected utilizing a 5FR FL3.5 EXPO catheter.  Right coronary artery angiography. The vessel was injected utilizing a 5FR  FR4.0 EXPO catheter. RADIATION EXPOSURE: 1.1 min.  CONTRAST GIVEN: Omnipaque 55 ml.  MEDICATIONS GIVEN: Midazolam, 1 mg, IV. Fentanyl, 25 mcg, IV. Verapamil  (Isoptin, Calan, Covera), 2.5 mg, IA. Heparin, 3000 units, IA.  VENTRICLES: Global left ventricular function was moderately depressed. EF  estimated was 40 %.  CORONARY VESSELS: The coronary circulation is right dominant.  LM:   --  LM: Normal.  LAD:   --  Proximal LAD: There was a 50 % stenosis.  CX:   --  Circumflex: Normal.  RCA:   --  Mid RCA: There was a 40 % stenosis.  --  Distal RCA: There was a 50 % stenosis.  COMPLICATIONS: There were no complications.  DIAGNOSTIC RECOMMENDATIONS: The patient should continue with the present  medications.  Prepared and signed by  Jairon Holguin M.D.  Signed 2017 12:20:13  HEMODYNAMIC TABLES  Pressures:  Baseline/ Room Air  Pressures:  - HR: 78  Pressures:  - Rhythm:  Pressures:  -- Aortic Pressure (S/D/M): --/--/99  Pressures:  -- Left Ventricle (s/edp): 157/39/--  Outputs:  Baseline/ Room Air  Outputs:  -- CALCULATIONS[age]  Outputs:  -- CALCULATIONS: Body Surface Area: 2.05  Outputs:  -- CALCULATIONS: Height in cm: 175.00  Outputs:  -- CALCULATIONS: Sex: Male  Outputs:  -- CALCULATIONS: Weight in k.40 (17 @ 21:55)    ======================================================  PAST MEDICAL & SURGICAL HISTORY:  HTN (hypertension)    CAD (coronary artery disease)  ; stent    Intracranial hemorrhage      Respiratory arrest      Myocardial infarction, unspecified MI type, unspecified artery    History of coronary artery stent placement    Assessment and Plan:     Assessment:  58 male with HTN, CAD (s/p PCI ), HFrEF, CVA , and T2DM presenting with chest pressure and unknown tachycardia that was shocked x1, [facility]  found to have in-stent restenosis of pLAD and  of RCA with elevated RA and PA pressures and severely decreased. Admitted to CICU for management of cardiogenic shock and ADHF requiring IABP  -, with hospital course c/b vfib arrest requiring reinsertion of IABP. Currently listed for transplant status 2.    Plan:  ====================== NEUROLOGY=====================  Anxiety  - No Seroquel/antipsychotics since vfib arrest and prolonged QTC  - Psych Eval, recommended SSRI, but pt. refused   - Psych recommending atarax PRN  - Continue to monitor mental status    PT/Conditioning  - Continue band exercised while on bedrest s/t IABP    ==================== RESPIRATORY======================  Acute Hypoxemic Respiratory Failure  - s/p x2 intubations for cardiogenic pulm edema and the in setting of cardiac arrest, resolved - extubated 11/10  - Currently on room air with spO2 mid 90s  - Continue incentive spirometry and monitoring of sp02    Asthma  - c/w albuterol, symbicort and spiriva  - On trelegy at home  - Continue to monitor SpO2 with goal >94%    ====================CARDIOVASCULAR==================  Vfib arrest i/s/o ischemia  - Lido gtt off   - PO Amio load - total of 5g per EP complete , continue PO amio  - Keep K > 4, Mag > 2.2     Cardiogenic shock requiring IABP (- , -)  - Likely 2/2 NSTEMI and ADHF  -  LHC: pLAD 100 % in-stent restenosis & mRCA, 100 %. PCWP 30. IABP placed.  -  TTE: LV dilated. EF 32 %. Regional WMAs present, mod (grade 2) LV diastolic dysfunction  -  TTE: EF 22% and + LV thrombus  -  s/p 500 cc bolus  - IABP swapped  to RFA, continue 1:1 support  - Off Milrinone gtt @ 7:30 am   - Currently on hydralazine 100 TID and ISD 40 TID for AL reduction  - [place] d/c'd due to elevated K levels  - c/w coreg 25 BID for GDMT  - Listed OHT status 2 , f/u HF recs    NSTEMI iso stent re-occlusion of pLAD and 100%  of RCA  - EKG on admission w/ LBBB  - DAPT: c/w ASA, Brilinta d/c'd per transplant w/u  - c/w lipitor 80  - cMR deferred given necessity of IABP  - CT sx not recommending CABG, undergoing AT eval    LV thrombus  - c/w heparin gtt    ===================== RENAL =========================  Non-oliguric ARIC   - Baseline Cr: 1-  - s/p 500 LR   - Renal US: no evidence of renal artery stenosis  - Trend BMP, lytes daily, replace as needed  - Continue Strict I/Os, avoid nephrotoxins    Mild Hyponatremia/Hyperkalemia iso ARIC and or new CKD baseline  - Continue fluid restriction   - Urea powder d/c'd per renal  - Trend daily  - Continue monitoring urine output, lytes, SCr/ BUN  - Replete lytes prn with goal K >4 and Mg >2    =============== GASTROINTESTINAL===================  Constipation/ileus, resolved  - s/p multiple BMs, symptoms improving   - c/w diet    ===================ENDO====================  Type 2 DM  - A1c 8.3  - Continue lantus, premeal, low ISS  - continue FS    ===================HEMATOLOGIC/ONC ===================  - H/H & plts stable  - Monitor H/H and plts  - VTE PPX: heparin gtt    ==================INFECTIOUS DISEASE================  # Leukocytosis  - Repeat BCx pending  - Monitor off abx at this time  - Monitor and trend WBC and temperature curve     # Enterococcus faecalis bacteremia, resolved  - BCx + for enterococcus faecalis x2, Staph epi x1 (likely contaminant)- pan sensitive   - Urine cx  + enterococcus faecalis  - BCx  NGTD  - IABP site swapped to RFA   - s/p Vancomycin 1g q12h (-)  - CT A/P negative for infectious pathology    # COVID, resolved  - Off airborne precautions     # Pre-transplant ID w/u   - Trend ID recs for serologies   - Colonoscopy  - normal   - Chest CT  - improved LLL aeration  - s/p immunizations      Patient requires continuous monitoring with bedside rhythm monitoring, pulse ox monitoring, and intermittent blood gas analysis. Care plan discussed with ICU care team. Patient remained critical and at risk for life threatening decompensation.  Patient seen, examined and plan discussed with CCU team during rounds.   I have personally provided ___ minutes of critical care time excluding time spent on separate procedures, in addition to initial critical care time provided by the CICU Attending, Dr. Delacruz    By signing my name below, I, Eleonora Marshall, attest that this documentation has been prepared under the direction and in the presence of KARYN Choi  Electronically signed: Rosalba Talavera, 23 @ 19:40  I, KARYN Choi, personally performed the services described in this documentation. all medical record entries made by the eileenibmartha were at my direction and in my presence. I have reviewed the chart and agree that the record reflects my personal performance and is accurate and complete  Electronically signed: KARYN Choi DEVORAH VALENCIA  MRN-[id number]  Patient is a 58y old  Male who presents with a chief complaint of pre advanced cardiac therapies evaluation (01 Dec 2023 15:06)    HPI:  pt seen and approx 1:30 pm  in [facility]    57yo M w/ hx HTN, CAD w/ 1 stent in , ICH () presenting with abn ekg. Patient presented to [facility] where he was found to have STEMI, recommended to get cath however patient did not want to get it there so it left and came here.  Patient initially had cough, congestion, fever, was placed on antibiotics on .  Started feeling nauseous and had a presyncopal event after which he presented to ED last night.  Had chest pain as well.  Chest pain is midsternal.  Not currently having chest pain.  Received 4 aspirin 30 min pta. (2023 15:11)    Hospital Course:  24 HOUR EVENTS:  REVIEW OF SYSTEMS:    CONSTITUTIONAL: No weakness, fevers or chills  EYES/ENT: No visual changes;  No vertigo or throat pain   NECK: No pain or stiffness  RESPIRATORY: No cough, wheezing, hemoptysis; No shortness of breath  CARDIOVASCULAR: No chest pain or palpitations  GASTROINTESTINAL: No abdominal or epigastric pain. No nausea, vomiting, or hematemesis; No diarrhea or constipation. No melena or hematochezia.  GENITOURINARY: No dysuria, frequency or hematuria  NEUROLOGICAL: No numbness or weakness  SKIN: No itching, rashes    ICU Vital Signs Last 24 Hrs  T(C): 36.9 (01 Dec 2023 15:00), Max: 36.9 (01 Dec 2023 15:00)  T(F): 98.4 (01 Dec 2023 15:00), Max: 98.4 (01 Dec 2023 15:00)  HR: 77 (01 Dec 2023 18:00) (62 - 79)  BP: --  BP(mean): --  ABP: --  ABP(mean): --  RR: 16 (01 Dec 2023 18:00) (14 - 21)  SpO2: 96% (01 Dec 2023 18:00) (95% - 98%)    O2 Parameters below as of 01 Dec 2023 18:00  Patient On (Oxygen Delivery Method): room air        CVP(mm Hg): --  CO: --  CI: --  PA: --  PA(mean): --  PA(direct): --  PCWP: --  LA: --  RA: --  SVR: --  SVRI: --  PVR: --  PVRI: --  I&O's Summary    2023 07:  -  01 Dec 2023 07:00  --------------------------------------------------------  IN: 1159 mL / OUT: 2465 mL / NET: -1306 mL    01 Dec 2023 07:01  -  01 Dec 2023 19:40  --------------------------------------------------------  IN: 514 mL / OUT: 950 mL / NET: -436 mL      CAPILLARY BLOOD GLUCOSE    CAPILLARY BLOOD GLUCOSE      POCT Blood Glucose.: 108 mg/dL (01 Dec 2023 17:24)    PHYSICAL EXAM:  GENERAL: No acute distress, well-developed  HEAD:  Atraumatic, Normocephalic  EYES: EOMI, PERRLA, conjunctiva and sclera clear  NECK: Supple, no lymphadenopathy, no JVD  CHEST/LUNG: CTAB; No wheezes, rales, or rhonchi  HEART: Regular rate and rhythm. Normal S1/S2. No murmurs, rubs, or gallops  ABDOMEN: Soft, non-tender, non-distended; normal bowel sounds, no organomegaly  EXTREMITIES:  2+ peripheral pulses b/l, No clubbing, cyanosis, or edema  NEUROLOGY: A&O x 3, no focal deficits  SKIN: No rashes or lesions  ============================I/O===========================   I&O's Detail    2023 07:01  -  01 Dec 2023 07:00  --------------------------------------------------------  IN:    Heparin: 279 mL    Oral Fluid: 880 mL  Total IN: 1159 mL    OUT:    Voided (mL): 2465 mL  Total OUT: 2465 mL    Total NET: -1306 mL      01 Dec 2023 07:01  -  01 Dec 2023 19:40  --------------------------------------------------------  IN:    Heparin: 154 mL    Oral Fluid: 360 mL  Total IN: 514 mL    OUT:    Voided (mL): 950 mL  Total OUT: 950 mL    Total NET: -436 mL      ============================ LABS =========================                        10.5   7.18  )-----------( 134      ( 01 Dec 2023 01:24 )             31.7         130<L>  |  101  |  44<H>  ----------------------------<  106<H>  3.6   |  17<L>  |  1.48<H>    Ca    9.2      01 Dec 2023 01:24  Phos  2.9       Mg     2.1         TPro  6.4  /  Alb  3.4  /  TBili  0.3  /  DBili  x   /  AST  22  /  ALT  32  /  AlkPhos  62      LIVER FUNCTIONS - ( 01 Dec 2023 01:24 )  Alb: 3.4 g/dL / Pro: 6.4 g/dL / ALK PHOS: 62 U/L / ALT: 32 U/L / AST: 22 U/L / GGT: x           PT/INR - ( 2023 00:39 )   PT: 13.3 sec;   INR: 1.22 ratio         PTT - ( 01 Dec 2023 08:20 )  PTT:65.1 sec    Lactate, Blood: 0.5 mmol/L (23 @ 01:25)  Lactate, Blood: 1.9 mmol/L (23 @ 05:37)    Urinalysis Basic - ( 01 Dec 2023 01:24 )    Color: x / Appearance: x / SG: x / pH: x  Gluc: 106 mg/dL / Ketone: x  / Bili: x / Urobili: x   Blood: x / Protein: x / Nitrite: x   Leuk Esterase: x / RBC: x / WBC x   Sq Epi: x / Non Sq Epi: x / Bacteria: x      ======================Micro/Rad/Cardio=================  Telemtry: Reviewed   EKG: Reviewed  CXR: Reviewed  Culture: Reviewed   Echo:   Cath: Cardiac Cath Lab - Adult:   [facility]  Department of Cardiology  [address] [place] [address]  [phone]  Cath Lab Report -- Comprehensive Report  Patient: LD VALENCIA  Study date: 2017  Account number: [id number]  MR number: [id number]  : 1965  Gender: Male  Race: W  Case Physician(s):  Jairon Holguin M.D.  Referring Physician:  Luc Lynn M.D.  INDICATIONS: Unstable angina - CCS4.  HISTORY: The patient has a history of coronary artery disease. The patient  hashypertension and medication-treated dyslipidemia.  PROCEDURE:  --  Left heart catheterization with ventriculography.  --  Left coronary angiography.  --  Right coronary angiography.  TECHNIQUE: The risks and alternatives of the procedures and conscious  sedation were explained to the patient and informed consent was obtained.  Cardiac catheterization performed electively.  Local anesthetic given. Right radial artery access. A 6FR PRELUDE KIT was  inserted in the vessel. Left heart catheterization. Ventriculography was  performed. A 5FR FR4.0 EXPO catheter was utilized. Left coronary artery  angiography. The vessel was injected utilizing a 5FR FL3.5 EXPO catheter.  Right coronary artery angiography. The vessel was injected utilizing a 5FR  FR4.0 EXPO catheter. RADIATION EXPOSURE: 1.1 min.  CONTRAST GIVEN: Omnipaque 55 ml.  MEDICATIONS GIVEN: Midazolam, 1 mg, IV. Fentanyl, 25 mcg, IV. Verapamil  (Isoptin, Calan, Covera), 2.5 mg, IA. Heparin, 3000 units, IA.  VENTRICLES: Global left ventricular function was moderately depressed. EF  estimated was 40 %.  CORONARY VESSELS: The coronary circulation is right dominant.  LM:   --  LM: Normal.  LAD:   --  Proximal LAD: There was a 50 % stenosis.  CX:   --  Circumflex: Normal.  RCA:   --  Mid RCA: There was a 40 % stenosis.  --  Distal RCA: There was a 50 % stenosis.  COMPLICATIONS: There were no complications.  DIAGNOSTIC RECOMMENDATIONS: The patient should continue with the present  medications.  Prepared and signed by  Jairon Holguin M.D.  Signed 2017 12:20:13  HEMODYNAMIC TABLES  Pressures:  Baseline/ Room Air  Pressures:  - HR: 78  Pressures:  - Rhythm:  Pressures:  -- Aortic Pressure (S/D/M): --/--/99  Pressures:  -- Left Ventricle (s/edp): 157/39/--  Outputs:  Baseline/ Room Air  Outputs:  -- CALCULATIONS[age]  Outputs:  -- CALCULATIONS: Body Surface Area: 2.05  Outputs:  -- CALCULATIONS: Height in cm: 175.00  Outputs:  -- CALCULATIONS: Sex: Male  Outputs:  -- CALCULATIONS: Weight in k.40 (17 @ 21:55)    ======================================================  PAST MEDICAL & SURGICAL HISTORY:  HTN (hypertension)    CAD (coronary artery disease)  ; stent    Intracranial hemorrhage      Respiratory arrest      Myocardial infarction, unspecified MI type, unspecified artery    History of coronary artery stent placement    Assessment and Plan:     Assessment:  58 male with HTN, CAD (s/p PCI ), HFrEF, CVA , and T2DM presenting with chest pressure and unknown tachycardia that was shocked x1, [facility]  found to have in-stent restenosis of pLAD and  of RCA with elevated RA and PA pressures and severely decreased. Admitted to CICU for management of cardiogenic shock and ADHF requiring IABP  -, with hospital course c/b vfib arrest requiring reinsertion of IABP. Currently listed for transplant status 2.    Plan:  ====================== NEUROLOGY=====================  Anxiety  - No Seroquel/antipsychotics since vfib arrest and prolonged QTC  - Psych Eval, recommended SSRI, but pt. refused   - Psych recommending atarax PRN  - Continue to monitor mental status    PT/Conditioning  - Continue band exercised while on bedrest s/t IABP    ==================== RESPIRATORY======================  Acute Hypoxemic Respiratory Failure  - s/p x2 intubations for cardiogenic pulm edema and the in setting of cardiac arrest, resolved - extubated 11/10  - Currently on room air with spO2 mid 90s  - Continue incentive spirometry and monitoring of sp02    Asthma  - c/w albuterol, symbicort and spiriva  - On trelegy at home  - Continue to monitor SpO2 with goal >94%    ====================CARDIOVASCULAR==================  Vfib arrest i/s/o ischemia  - Lido gtt off   - PO Amio load - total of 5g per EP complete , continue PO amio  - Keep K > 4, Mag > 2.2     Cardiogenic shock requiring IABP (- , -)  - Likely 2/2 NSTEMI and ADHF  -  LHC: pLAD 100 % in-stent restenosis & mRCA, 100 %. PCWP 30. IABP placed.  -  TTE: LV dilated. EF 32 %. Regional WMAs present, mod (grade 2) LV diastolic dysfunction  -  TTE: EF 22% and + LV thrombus  -  s/p 500 cc bolus  - IABP swapped  to RFA, continue 1:1 support  - Off Milrinone gtt @ 7:30 am   - Currently on hydralazine 100 TID and ISD 40 TID for AL reduction  - [place] d/c'd due to elevated K levels  - c/w coreg 25 BID for GDMT  - Listed OHT status 2 , f/u HF recs    NSTEMI iso stent re-occlusion of pLAD and 100%  of RCA  - EKG on admission w/ LBBB  - DAPT: c/w ASA, Brilinta d/c'd per transplant w/u  - c/w lipitor 80  - cMR deferred given necessity of IABP  - CT sx not recommending CABG, undergoing AT eval    LV thrombus  - c/w heparin gtt    ===================== RENAL =========================  Non-oliguric ARIC   - Baseline Cr: 1-  - s/p 500 LR   - Renal US: no evidence of renal artery stenosis  - Trend BMP, lytes daily, replace as needed  - Continue Strict I/Os, avoid nephrotoxins    Mild Hyponatremia/Hyperkalemia iso ARIC and or new CKD baseline  - Continue fluid restriction   - Urea powder d/c'd per renal  - Trend daily  - Continue monitoring urine output, lytes, SCr/ BUN  - Replete lytes prn with goal K >4 and Mg >2    =============== GASTROINTESTINAL===================  Constipation/ileus, resolved  - s/p multiple BMs, symptoms improving   - c/w diet    ===================ENDO====================  Type 2 DM  - A1c 8.3  - Continue lantus, premeal, low ISS  - continue FS    ===================HEMATOLOGIC/ONC ===================  - H/H & plts stable  - Monitor H/H and plts  - VTE PPX: heparin gtt    ==================INFECTIOUS DISEASE================  # Leukocytosis  - Repeat BCx pending  - Monitor off abx at this time  - Monitor and trend WBC and temperature curve     # Enterococcus faecalis bacteremia, resolved  - BCx + for enterococcus faecalis x2, Staph epi x1 (likely contaminant)- pan sensitive   - Urine cx  + enterococcus faecalis  - BCx  NGTD  - IABP site swapped to RFA   - s/p Vancomycin 1g q12h (-)  - CT A/P negative for infectious pathology    # COVID, resolved  - Off airborne precautions     # Pre-transplant ID w/u   - Trend ID recs for serologies   - Colonoscopy  - normal   - Chest CT  - improved LLL aeration  - s/p immunizations      Patient requires continuous monitoring with bedside rhythm monitoring, pulse ox monitoring, and intermittent blood gas analysis. Care plan discussed with ICU care team. Patient remained critical and at risk for life threatening decompensation.  Patient seen, examined and plan discussed with CCU team during rounds.   I have personally provided ___ minutes of critical care time excluding time spent on separate procedures, in addition to initial critical care time provided by the CICU Attending, Dr. Delacruz    By signing my name below, I, Eleonora Marshall, attest that this documentation has been prepared under the direction and in the presence of KRAYN Choi  Electronically signed: Rosalba Talavera, 23 @ 19:40  I, KARYN Choi, personally performed the services described in this documentation. all medical record entries made by the eileenibmartha were at my direction and in my presence. I have reviewed the chart and agree that the record reflects my personal performance and is accurate and complete  Electronically signed: KARYN Choi DEVORAH VALENCIA  MRN-[id number]  Patient is a 58y old  Male who presents with a chief complaint of pre advanced cardiac therapies evaluation (01 Dec 2023 15:06)    HPI:  pt seen and approx 1:30 pm  in [facility]    57yo M w/ hx HTN, CAD w/ 1 stent in , ICH () presenting with abn ekg. Patient presented to [facility] where he was found to have STEMI, recommended to get cath however patient did not want to get it there so it left and came here.  Patient initially had cough, congestion, fever, was placed on antibiotics on .  Started feeling nauseous and had a presyncopal event after which he presented to ED last night.  Had chest pain as well.  Chest pain is midsternal.  Not currently having chest pain.  Received 4 aspirin 30 min pta. (2023 15:11)    Hospital Course:  24 HOUR EVENTS:  REVIEW OF SYSTEMS:    CONSTITUTIONAL: No weakness, fevers or chills  EYES/ENT: No visual changes;  No vertigo or throat pain   NECK: No pain or stiffness  RESPIRATORY: No cough, wheezing, hemoptysis; No shortness of breath  CARDIOVASCULAR: No chest pain or palpitations  GASTROINTESTINAL: No abdominal or epigastric pain. No nausea, vomiting, or hematemesis; No diarrhea or constipation. No melena or hematochezia.  GENITOURINARY: No dysuria, frequency or hematuria  NEUROLOGICAL: No numbness or weakness  SKIN: No itching, rashes    ICU Vital Signs Last 24 Hrs  T(C): 36.9 (01 Dec 2023 15:00), Max: 36.9 (01 Dec 2023 15:00)  T(F): 98.4 (01 Dec 2023 15:00), Max: 98.4 (01 Dec 2023 15:00)  HR: 77 (01 Dec 2023 18:00) (62 - 79)  BP: --  BP(mean): --  ABP: --  ABP(mean): --  RR: 16 (01 Dec 2023 18:00) (14 - 21)  SpO2: 96% (01 Dec 2023 18:00) (95% - 98%)    O2 Parameters below as of 01 Dec 2023 18:00  Patient On (Oxygen Delivery Method): room air        CVP(mm Hg): --  CO: --  CI: --  PA: --  PA(mean): --  PA(direct): --  PCWP: --  LA: --  RA: --  SVR: --  SVRI: --  PVR: --  PVRI: --  I&O's Summary    2023 07:  -  01 Dec 2023 07:00  --------------------------------------------------------  IN: 1159 mL / OUT: 2465 mL / NET: -1306 mL    01 Dec 2023 07:01  -  01 Dec 2023 19:40  --------------------------------------------------------  IN: 514 mL / OUT: 950 mL / NET: -436 mL      CAPILLARY BLOOD GLUCOSE    CAPILLARY BLOOD GLUCOSE      POCT Blood Glucose.: 108 mg/dL (01 Dec 2023 17:24)    PHYSICAL EXAM:  GENERAL: No acute distress, well-developed  HEAD:  Atraumatic, Normocephalic  EYES: EOMI, PERRLA, conjunctiva and sclera clear  NECK: Supple, no lymphadenopathy, no JVD  CHEST/LUNG: CTAB; No wheezes, rales, or rhonchi  HEART: Regular rate and rhythm. Normal S1/S2. No murmurs, rubs, or gallops  ABDOMEN: Soft, non-tender, non-distended; normal bowel sounds, no organomegaly  EXTREMITIES:  2+ peripheral pulses b/l, No clubbing, cyanosis, or edema  NEUROLOGY: A&O x 3, no focal deficits  SKIN: No rashes or lesions  ============================I/O===========================   I&O's Detail    2023 07:01  -  01 Dec 2023 07:00  --------------------------------------------------------  IN:    Heparin: 279 mL    Oral Fluid: 880 mL  Total IN: 1159 mL    OUT:    Voided (mL): 2465 mL  Total OUT: 2465 mL    Total NET: -1306 mL      01 Dec 2023 07:01  -  01 Dec 2023 19:40  --------------------------------------------------------  IN:    Heparin: 154 mL    Oral Fluid: 360 mL  Total IN: 514 mL    OUT:    Voided (mL): 950 mL  Total OUT: 950 mL    Total NET: -436 mL      ============================ LABS =========================                        10.5   7.18  )-----------( 134      ( 01 Dec 2023 01:24 )             31.7         130<L>  |  101  |  44<H>  ----------------------------<  106<H>  3.6   |  17<L>  |  1.48<H>    Ca    9.2      01 Dec 2023 01:24  Phos  2.9       Mg     2.1         TPro  6.4  /  Alb  3.4  /  TBili  0.3  /  DBili  x   /  AST  22  /  ALT  32  /  AlkPhos  62      LIVER FUNCTIONS - ( 01 Dec 2023 01:24 )  Alb: 3.4 g/dL / Pro: 6.4 g/dL / ALK PHOS: 62 U/L / ALT: 32 U/L / AST: 22 U/L / GGT: x           PT/INR - ( 2023 00:39 )   PT: 13.3 sec;   INR: 1.22 ratio         PTT - ( 01 Dec 2023 08:20 )  PTT:65.1 sec    Lactate, Blood: 0.5 mmol/L (23 @ 01:25)  Lactate, Blood: 1.9 mmol/L (23 @ 05:37)    Urinalysis Basic - ( 01 Dec 2023 01:24 )    Color: x / Appearance: x / SG: x / pH: x  Gluc: 106 mg/dL / Ketone: x  / Bili: x / Urobili: x   Blood: x / Protein: x / Nitrite: x   Leuk Esterase: x / RBC: x / WBC x   Sq Epi: x / Non Sq Epi: x / Bacteria: x      ======================Micro/Rad/Cardio=================  Telemtry: Reviewed   EKG: Reviewed  CXR: Reviewed  Culture: Reviewed   Echo:   Cath: Cardiac Cath Lab - Adult:   [facility]  Department of Cardiology  [address] [place] [address]  [phone]  Cath Lab Report -- Comprehensive Report  Patient: LD VALENCIA  Study date: 2017  Account number: [id number]  MR number: [id number]  : 1965  Gender: Male  Race: W  Case Physician(s):  Jairon Holguin M.D.  Referring Physician:  Luc Lynn M.D.  INDICATIONS: Unstable angina - CCS4.  HISTORY: The patient has a history of coronary artery disease. The patient  hashypertension and medication-treated dyslipidemia.  PROCEDURE:  --  Left heart catheterization with ventriculography.  --  Left coronary angiography.  --  Right coronary angiography.  TECHNIQUE: The risks and alternatives of the procedures and conscious  sedation were explained to the patient and informed consent was obtained.  Cardiac catheterization performed electively.  Local anesthetic given. Right radial artery access. A 6FR PRELUDE KIT was  inserted in the vessel. Left heart catheterization. Ventriculography was  performed. A 5FR FR4.0 EXPO catheter was utilized. Left coronary artery  angiography. The vessel was injected utilizing a 5FR FL3.5 EXPO catheter.  Right coronary artery angiography. The vessel was injected utilizing a 5FR  FR4.0 EXPO catheter. RADIATION EXPOSURE: 1.1 min.  CONTRAST GIVEN: Omnipaque 55 ml.  MEDICATIONS GIVEN: Midazolam, 1 mg, IV. Fentanyl, 25 mcg, IV. Verapamil  (Isoptin, Calan, Covera), 2.5 mg, IA. Heparin, 3000 units, IA.  VENTRICLES: Global left ventricular function was moderately depressed. EF  estimated was 40 %.  CORONARY VESSELS: The coronary circulation is right dominant.  LM:   --  LM: Normal.  LAD:   --  Proximal LAD: There was a 50 % stenosis.  CX:   --  Circumflex: Normal.  RCA:   --  Mid RCA: There was a 40 % stenosis.  --  Distal RCA: There was a 50 % stenosis.  COMPLICATIONS: There were no complications.  DIAGNOSTIC RECOMMENDATIONS: The patient should continue with the present  medications.  Prepared and signed by  Jairon Holguin M.D.  Signed 2017 12:20:13  HEMODYNAMIC TABLES  Pressures:  Baseline/ Room Air  Pressures:  - HR: 78  Pressures:  - Rhythm:  Pressures:  -- Aortic Pressure (S/D/M): --/--/99  Pressures:  -- Left Ventricle (s/edp): 157/39/--  Outputs:  Baseline/ Room Air  Outputs:  -- CALCULATIONS[age]  Outputs:  -- CALCULATIONS: Body Surface Area: 2.05  Outputs:  -- CALCULATIONS: Height in cm: 175.00  Outputs:  -- CALCULATIONS: Sex: Male  Outputs:  -- CALCULATIONS: Weight in k.40 (17 @ 21:55)    ======================================================  PAST MEDICAL & SURGICAL HISTORY:  HTN (hypertension)    CAD (coronary artery disease)  ; stent    Intracranial hemorrhage      Respiratory arrest      Myocardial infarction, unspecified MI type, unspecified artery    History of coronary artery stent placement    Assessment and Plan:     Assessment:  58 male with HTN, CAD (s/p PCI ), HFrEF, CVA , and T2DM presenting with chest pressure and unknown tachycardia that was shocked x1, [facility]  found to have in-stent restenosis of pLAD and  of RCA with elevated RA and PA pressures and severely decreased. Admitted to CICU for management of cardiogenic shock and ADHF requiring IABP  -, with hospital course c/b vfib arrest requiring reinsertion of IABP. Currently listed for transplant status 2.    Plan:  ====================== NEUROLOGY=====================  Anxiety  - No Seroquel/antipsychotics since vfib arrest and prolonged QTC  - Psych Eval, recommended SSRI, but pt. refused   - Psych recommending atarax PRN  - Continue to monitor mental status    PT/Conditioning  - Continue band exercised while on bedrest s/t IABP    ==================== RESPIRATORY======================  Acute Hypoxemic Respiratory Failure  - s/p x2 intubations for cardiogenic pulm edema and the in setting of cardiac arrest, resolved - extubated 11/10  - Currently on room air with spO2 mid 90s  - Continue incentive spirometry and monitoring of sp02    Asthma  - c/w albuterol, symbicort and spiriva  - On trelegy at home  - Continue to monitor SpO2 with goal >94%    ====================CARDIOVASCULAR==================  Vfib arrest i/s/o ischemia  - Lido gtt off   - PO Amio load - total of 5g per EP complete , continue PO amio  - Keep K > 4, Mag > 2.2     Cardiogenic shock requiring IABP (- , -)  - Likely 2/2 NSTEMI and ADHF  -  LHC: pLAD 100 % in-stent restenosis & mRCA, 100 %. PCWP 30. IABP placed.  -  TTE: LV dilated. EF 32 %. Regional WMAs present, mod (grade 2) LV diastolic dysfunction  -  TTE: EF 22% and + LV thrombus  -  s/p 500 cc bolus  - IABP swapped  to RFA, continue 1:1 support  - Off Milrinone gtt @ 7:30 am   - Currently on hydralazine 100 TID and ISD 40 TID for AL reduction  - [place] d/c'd due to elevated K levels  - c/w coreg 25 BID for GDMT  - Listed OHT status 2 , f/u HF recs    NSTEMI iso stent re-occlusion of pLAD and 100%  of RCA  - EKG on admission w/ LBBB  - DAPT: c/w ASA, Brilinta d/c'd per transplant w/u  - c/w lipitor 80  - cMR deferred given necessity of IABP  - CT sx not recommending CABG, undergoing AT eval    LV thrombus  - c/w heparin gtt    ===================== RENAL =========================  Non-oliguric ARIC   - Baseline Cr: 1-  - s/p 500 LR   - Renal US: no evidence of renal artery stenosis  - Trend BMP, lytes daily, replace as needed  - Continue Strict I/Os, avoid nephrotoxins    Mild Hyponatremia/Hyperkalemia iso ARIC and or new CKD baseline  - Continue fluid restriction   - Urea powder d/c'd per renal  - Trend daily  - Continue monitoring urine output, lytes, SCr/ BUN  - Replete lytes prn with goal K >4 and Mg >2    =============== GASTROINTESTINAL===================  Constipation/ileus, resolved  - s/p multiple BMs, symptoms improving   - c/w diet    ===================ENDO====================  Type 2 DM  - A1c 8.3  - Continue lantus, premeal, low ISS  - continue FS    ===================HEMATOLOGIC/ONC ===================  - H/H & plts stable  - Monitor H/H and plts  - VTE PPX: heparin gtt    ==================INFECTIOUS DISEASE================  # Leukocytosis  - Repeat BCx pending  - Monitor off abx at this time  - Monitor and trend WBC and temperature curve     # Enterococcus faecalis bacteremia, resolved  - BCx + for enterococcus faecalis x2, Staph epi x1 (likely contaminant)- pan sensitive   - Urine cx  + enterococcus faecalis  - BCx  NGTD  - IABP site swapped to RFA   - s/p Vancomycin 1g q12h (-)  - CT A/P negative for infectious pathology    # COVID, resolved  - Off airborne precautions     # Pre-transplant ID w/u   - Trend ID recs for serologies   - Colonoscopy  - normal   - Chest CT  - improved LLL aeration  - s/p immunizations      Patient requires continuous monitoring with bedside rhythm monitoring, pulse ox monitoring, and intermittent blood gas analysis. Care plan discussed with ICU care team. Patient remained critical and at risk for life threatening decompensation.  Patient seen, examined and plan discussed with CCU team during rounds.   I have personally provided ___ minutes of critical care time excluding time spent on separate procedures, in addition to initial critical care time provided by the CICU Attending, Dr. Delacruz    By signing my name below, I, Eleonora Marshall, attest that this documentation has been prepared under the direction and in the presence of KARYN Choi  Electronically signed: Rosalba Talavera, 23 @ 19:40  I, KARYN Choi, personally performed the services described in this documentation. all medical record entries made by the eileenibmartha were at my direction and in my presence. I have reviewed the chart and agree that the record reflects my personal performance and is accurate and complete  Electronically signed: KARYN Choi DEVORAH VALENCIA  MRN-[id number]  Patient is a 58y old  Male who presents with a chief complaint of pre advanced cardiac therapies evaluation (01 Dec 2023 15:06)    HPI:  59yo M w/ hx HTN, CAD w/ 1 stent in , ICH () presenting with abn ekg. Patient presented to [facility] where he was found to have STEMI, recommended to get cath however patient did not want to get it there so it left and came here.  Patient initially had cough, congestion, fever, was placed on antibiotics on .  Started feeling nauseous and had a presyncopal event after which he presented to ED last night.  Had chest pain as well.  Chest pain is midsternal.  Not currently having chest pain.  Received 4 aspirin 30 min pta. (2023 15:11)    Hospital Course:  24 HOUR EVENTS:  - no acute events     REVIEW OF SYSTEMS:    CONSTITUTIONAL: No weakness, fevers or chills  EYES/ENT: No visual changes;  No vertigo or throat pain   NECK: No pain or stiffness  RESPIRATORY: No cough, wheezing, hemoptysis; No shortness of breath  CARDIOVASCULAR: No chest pain or palpitations  GASTROINTESTINAL: No abdominal or epigastric pain. No nausea, vomiting, or hematemesis; No diarrhea or constipation. No melena or hematochezia.  GENITOURINARY: No dysuria, frequency or hematuria  NEUROLOGICAL: No numbness or weakness  SKIN: No itching, rashes    ICU Vital Signs Last 24 Hrs  T(C): 36.9 (01 Dec 2023 15:00), Max: 36.9 (01 Dec 2023 15:00)  T(F): 98.4 (01 Dec 2023 15:00), Max: 98.4 (01 Dec 2023 15:00)  HR: 77 (01 Dec 2023 18:00) (62 - 79)  BP: --  BP(mean): --  ABP: --  ABP(mean): --  RR: 16 (01 Dec 2023 18:00) (14 - 21)  SpO2: 96% (01 Dec 2023 18:00) (95% - 98%)    O2 Parameters below as of 01 Dec 2023 18:00  Patient On (Oxygen Delivery Method): room air        CVP(mm Hg): --  CO: --  CI: --  PA: --  PA(mean): --  PA(direct): --  PCWP: --  LA: --  RA: --  SVR: --  SVRI: --  PVR: --  PVRI: --  I&O's Summary    2023 07:  -  01 Dec 2023 07:00  --------------------------------------------------------  IN: 1159 mL / OUT: 2465 mL / NET: -1306 mL    01 Dec 2023 07:01  -  01 Dec 2023 19:40  --------------------------------------------------------  IN: 514 mL / OUT: 950 mL / NET: -436 mL      CAPILLARY BLOOD GLUCOSE    CAPILLARY BLOOD GLUCOSE      POCT Blood Glucose.: 108 mg/dL (01 Dec 2023 17:24)    PHYSICAL EXAM:  GENERAL: No acute distress, well-developed  HEAD:  Atraumatic, Normocephalic  EYES: EOMI, PERRLA, conjunctiva and sclera clear  NECK: Supple, no lymphadenopathy, no JVD  CHEST/LUNG: CTAB; No wheezes, rales, or rhonchi  HEART: Regular rate and rhythm. Normal S1/S2. No murmurs, rubs, or gallops  ABDOMEN: Soft, non-tender, non-distended; normal bowel sounds, no organomegaly  EXTREMITIES:  2+ peripheral pulses b/l, No clubbing, cyanosis, or edema  NEUROLOGY: A&O x 3, no focal deficits  SKIN: No rashes or lesions  ============================I/O===========================   I&O's Detail    2023 07:01  -  01 Dec 2023 07:00  --------------------------------------------------------  IN:    Heparin: 279 mL    Oral Fluid: 880 mL  Total IN: 1159 mL    OUT:    Voided (mL): 2465 mL  Total OUT: 2465 mL    Total NET: -1306 mL      01 Dec 2023 07:01  -  01 Dec 2023 19:40  --------------------------------------------------------  IN:    Heparin: 154 mL    Oral Fluid: 360 mL  Total IN: 514 mL    OUT:    Voided (mL): 950 mL  Total OUT: 950 mL    Total NET: -436 mL      ============================ LABS =========================                        10.5   7.18  )-----------( 134      ( 01 Dec 2023 01:24 )             31.7     12    130<L>  |  101  |  44<H>  ----------------------------<  106<H>  3.6   |  17<L>  |  1.48<H>    Ca    9.2      01 Dec 2023 01:24  Phos  2.9       Mg     2.1         TPro  6.4  /  Alb  3.4  /  TBili  0.3  /  DBili  x   /  AST  22  /  ALT  32  /  AlkPhos  62  12    LIVER FUNCTIONS - ( 01 Dec 2023 01:24 )  Alb: 3.4 g/dL / Pro: 6.4 g/dL / ALK PHOS: 62 U/L / ALT: 32 U/L / AST: 22 U/L / GGT: x           PT/INR - ( 2023 00:39 )   PT: 13.3 sec;   INR: 1.22 ratio         PTT - ( 01 Dec 2023 08:20 )  PTT:65.1 sec    Lactate, Blood: 0.5 mmol/L (23 @ 01:25)  Lactate, Blood: 1.9 mmol/L (23 @ 05:37)    Urinalysis Basic - ( 01 Dec 2023 01:24 )    Color: x / Appearance: x / SG: x / pH: x  Gluc: 106 mg/dL / Ketone: x  / Bili: x / Urobili: x   Blood: x / Protein: x / Nitrite: x   Leuk Esterase: x / RBC: x / WBC x   Sq Epi: x / Non Sq Epi: x / Bacteria: x      ======================Micro/Rad/Cardio=================  Telemtry: Reviewed   EKG: Reviewed  CXR: Reviewed  Culture: Reviewed   Echo:   Cath: Cardiac Cath Lab - Adult:   [facility]  Department of Cardiology  [address] [place], [place] [address] [phone]  Cath Lab Report -- Comprehensive Report  Patient: LD VALENCIA  Study date: 2017  Account number: [id number]  MR number: [id number]  : 1965  Gender: Male  Race: W  Case Physician(s):  Jairon Holguin M.D.  Referring Physician:  Luc Lynn M.D.  INDICATIONS: Unstable angina - CCS4.  HISTORY: The patient has a history of coronary artery disease. The patient  hashypertension and medication-treated dyslipidemia.  PROCEDURE:  --  Left heart catheterization with ventriculography.  --  Left coronary angiography.  --  Right coronary angiography.  TECHNIQUE: The risks and alternatives of the procedures and conscious  sedation were explained to the patient and informed consent was obtained.  Cardiac catheterization performed electively.  Local anesthetic given. Right radial artery access. A 6FR PRELUDE KIT was  inserted in the vessel. Left heart catheterization. Ventriculography was  performed. A 5FR FR4.0 EXPO catheter was utilized. Left coronary artery  angiography. The vessel was injected utilizing a 5FR FL3.5 EXPO catheter.  Right coronary artery angiography. The vessel was injected utilizing a 5FR  FR4.0 EXPO catheter. RADIATION EXPOSURE: 1.1 min.  CONTRAST GIVEN: Omnipaque 55 ml.  MEDICATIONS GIVEN: Midazolam, 1 mg, IV. Fentanyl, 25 mcg, IV. Verapamil  (Isoptin, Calan, Covera), 2.5 mg, IA. Heparin, 3000 units, IA.  VENTRICLES: Global left ventricular function was moderately depressed. EF  estimated was 40 %.  CORONARY VESSELS: The coronary circulation is right dominant.  LM:   --  LM: Normal.  LAD:   --  Proximal LAD: There was a 50 % stenosis.  CX:   --  Circumflex: Normal.  RCA:   --  Mid RCA: There was a 40 % stenosis.  --  Distal RCA: There was a 50 % stenosis.  COMPLICATIONS: There were no complications.  DIAGNOSTIC RECOMMENDATIONS: The patient should continue with the present  medications.  Prepared and signed by  Jairon Holguin M.D.  Signed 2017 12:20:13  HEMODYNAMIC TABLES  Pressures:  Baseline/ Room Air  Pressures:  - HR: 78  Pressures:  - Rhythm:  Pressures:  -- Aortic Pressure (S/D/M): --/--/99  Pressures:  -- Left Ventricle (s/edp): 157/39/--  Outputs:  Baseline/ Room Air  Outputs:  -- CALCULATIONS[age]  Outputs:  -- CALCULATIONS: Body Surface Area: 2.05  Outputs:  -- CALCULATIONS: Height in cm: 175.00  Outputs:  -- CALCULATIONS: Sex: Male  Outputs:  -- CALCULATIONS: Weight in k.40 (17 @ 21:55)    ======================================================  PAST MEDICAL & SURGICAL HISTORY:  HTN (hypertension)    CAD (coronary artery disease)  ; stent    Intracranial hemorrhage      Respiratory arrest      Myocardial infarction, unspecified MI type, unspecified artery    History of coronary artery stent placement    Assessment and Plan:     Assessment:  58 male with HTN, CAD (s/p PCI ), HFrEF, CVA , and T2DM presenting with chest pressure and unknown tachycardia that was shocked x1, C  found to have in-stent restenosis of pLAD and  of RCA with elevated RA and PA pressures and severely decreased. Admitted to CICU for management of cardiogenic shock and ADHF requiring IABP  -, with hospital course c/b vfib arrest requiring reinsertion of IABP. Currently listed for transplant status 2.    Plan:  ====================== NEUROLOGY=====================  Anxiety  - No Seroquel/antipsychotics since vfib arrest and prolonged QTC  - Psych Eval, recommended SSRI, but pt. refused   - Psych recommending atarax PRN  - Continue to monitor mental status    PT/Conditioning  - Continue band exercised while on bedrest s/t IABP    ==================== RESPIRATORY======================  Acute Hypoxemic Respiratory Failure  - s/p x2 intubations for cardiogenic pulm edema and the in setting of cardiac arrest, resolved - extubated 11/10  - Currently on room air with spO2 mid 90s  - Continue incentive spirometry and monitoring of sp02    Asthma  - c/w albuterol, symbicort and spiriva  - On trelegy at home  - Continue to monitor SpO2 with goal >94%    ====================CARDIOVASCULAR==================  Vfib arrest i/s/o ischemia  - Lido gtt off   - PO Amio load - total of 5g per EP complete , continue PO amio  - Keep K > 4, Mag > 2.2     Cardiogenic shock requiring IABP (- , -)  - Likely 2/2 NSTEMI and ADHF  -  LHC: pLAD 100 % in-stent restenosis & mRCA, 100 %. PCWP 30. IABP placed.  -  TTE: LV dilated. EF 32 %. Regional WMAs present, mod (grade 2) LV diastolic dysfunction  -  TTE: EF 22% and + LV thrombus  -  s/p 500 cc bolus  - IABP swapped  to RFA, continue 1:1 support  - Off Milrinone gtt @ 7:30 am   - Currently on hydralazine 100 TID and ISD 40 TID for AL reduction  - [place] d/c'd due to elevated K levels  - c/w coreg 25 BID for GDMT  - Listed OHT status 2 , f/u HF recs    NSTEMI iso stent re-occlusion of pLAD and 100%  of RCA  - EKG on admission w/ LBBB  - DAPT: c/w ASA, Brilinta d/c'd per transplant w/u  - c/w lipitor 80  - cMR deferred given necessity of IABP  - CT sx not recommending CABG, undergoing AT eval    LV thrombus  - c/w heparin gtt    ===================== RENAL =========================  Non-oliguric ARIC   - Baseline Cr: 1-  - s/p 500 LR   - Renal US: no evidence of renal artery stenosis  - Trend BMP, lytes daily, replace as needed  - Continue Strict I/Os, avoid nephrotoxins    Mild Hyponatremia/Hyperkalemia iso ARIC and or new CKD baseline  - Continue fluid restriction   - Urea powder d/c'd per renal  - Trend daily  - Continue monitoring urine output, lytes, SCr/ BUN  - Replete lytes prn with goal K >4 and Mg >2    =============== GASTROINTESTINAL===================  Constipation/ileus, resolved  - s/p multiple BMs, symptoms improving   - c/w diet    ===================ENDO====================  Type 2 DM  - A1c 8.3  - Continue lantus, premeal, low ISS  - continue FS    ===================HEMATOLOGIC/ONC ===================  - H/H & plts stable  - Monitor H/H and plts  - VTE PPX: heparin gtt    ==================INFECTIOUS DISEASE================  # Leukocytosis  - Repeat BCx pending  - Monitor off abx at this time  - Monitor and trend WBC and temperature curve     # Enterococcus faecalis bacteremia, resolved  - BCx + for enterococcus faecalis x2, Staph epi x1 (likely contaminant)- pan sensitive   - Urine cx  + enterococcus faecalis  - BCx  NGTD  - IABP site swapped to RFA   - s/p Vancomycin 1g q12h (-)  - CT A/P negative for infectious pathology    # COVID, resolved  - Off airborne precautions     # Pre-transplant ID w/u   - Trend ID recs for serologies   - Colonoscopy  - normal   - Chest CT  - improved LLL aeration  - s/p immunizations      Patient requires continuous monitoring with bedside rhythm monitoring, pulse ox monitoring, and intermittent blood gas analysis. Care plan discussed with ICU care team. Patient remained critical and at risk for life threatening decompensation.  Patient seen, examined and plan discussed with CCU team during rounds.   I have personally provided 35 minutes of critical care time excluding time spent on separate procedures, in addition to initial critical care time provided by the CICU Attending, Dr. Delacruz    By signing my name below, I, Eleonora Marshall, attest that this documentation has been prepared under the direction and in the presence of KARYN Choi  Electronically signed: Rosalba Talavera, 23 @ 19:40  I, KARYN Choi, personally performed the services described in this documentation. all medical record entries made by the eileenibmartha were at my direction and in my presence. I have reviewed the chart and agree that the record reflects my personal performance and is accurate and complete  Electronically signed: KARYN Choi DEVORAH VALENCIA  MRN-[id number]  Patient is a 58y old  Male who presents with a chief complaint of pre advanced cardiac therapies evaluation (01 Dec 2023 15:06)    HPI:  59yo M w/ hx HTN, CAD w/ 1 stent in , ICH () presenting with abn ekg. Patient presented to [facility] where he was found to have STEMI, recommended to get cath however patient did not want to get it there so it left and came here.  Patient initially had cough, congestion, fever, was placed on antibiotics on .  Started feeling nauseous and had a presyncopal event after which he presented to ED last night.  Had chest pain as well.  Chest pain is midsternal.  Not currently having chest pain.  Received 4 aspirin 30 min pta. (2023 15:11)    Hospital Course:  24 HOUR EVENTS:  - no acute events     REVIEW OF SYSTEMS:    CONSTITUTIONAL: No weakness, fevers or chills  EYES/ENT: No visual changes;  No vertigo or throat pain   NECK: No pain or stiffness  RESPIRATORY: No cough, wheezing, hemoptysis; No shortness of breath  CARDIOVASCULAR: No chest pain or palpitations  GASTROINTESTINAL: No abdominal or epigastric pain. No nausea, vomiting, or hematemesis; No diarrhea or constipation. No melena or hematochezia.  GENITOURINARY: No dysuria, frequency or hematuria  NEUROLOGICAL: No numbness or weakness  SKIN: No itching, rashes    ICU Vital Signs Last 24 Hrs  T(C): 36.9 (01 Dec 2023 15:00), Max: 36.9 (01 Dec 2023 15:00)  T(F): 98.4 (01 Dec 2023 15:00), Max: 98.4 (01 Dec 2023 15:00)  HR: 77 (01 Dec 2023 18:00) (62 - 79)  BP: --  BP(mean): --  ABP: --  ABP(mean): --  RR: 16 (01 Dec 2023 18:00) (14 - 21)  SpO2: 96% (01 Dec 2023 18:00) (95% - 98%)    O2 Parameters below as of 01 Dec 2023 18:00  Patient On (Oxygen Delivery Method): room air        CVP(mm Hg): --  CO: --  CI: --  PA: --  PA(mean): --  PA(direct): --  PCWP: --  LA: --  RA: --  SVR: --  SVRI: --  PVR: --  PVRI: --  I&O's Summary    2023 07:  -  01 Dec 2023 07:00  --------------------------------------------------------  IN: 1159 mL / OUT: 2465 mL / NET: -1306 mL    01 Dec 2023 07:01  -  01 Dec 2023 19:40  --------------------------------------------------------  IN: 514 mL / OUT: 950 mL / NET: -436 mL      CAPILLARY BLOOD GLUCOSE    CAPILLARY BLOOD GLUCOSE      POCT Blood Glucose.: 108 mg/dL (01 Dec 2023 17:24)    PHYSICAL EXAM:  GENERAL: No acute distress, well-developed  HEAD:  Atraumatic, Normocephalic  EYES: EOMI, PERRLA, conjunctiva and sclera clear  NECK: Supple, no lymphadenopathy, no JVD  CHEST/LUNG: CTAB; No wheezes, rales, or rhonchi  HEART: Regular rate and rhythm. Normal S1/S2. No murmurs, rubs, or gallops  ABDOMEN: Soft, non-tender, non-distended; normal bowel sounds, no organomegaly  EXTREMITIES:  2+ peripheral pulses b/l, No clubbing, cyanosis, or edema  NEUROLOGY: A&O x 3, no focal deficits  SKIN: No rashes or lesions  ============================I/O===========================   I&O's Detail    2023 07:01  -  01 Dec 2023 07:00  --------------------------------------------------------  IN:    Heparin: 279 mL    Oral Fluid: 880 mL  Total IN: 1159 mL    OUT:    Voided (mL): 2465 mL  Total OUT: 2465 mL    Total NET: -1306 mL      01 Dec 2023 07:01  -  01 Dec 2023 19:40  --------------------------------------------------------  IN:    Heparin: 154 mL    Oral Fluid: 360 mL  Total IN: 514 mL    OUT:    Voided (mL): 950 mL  Total OUT: 950 mL    Total NET: -436 mL      ============================ LABS =========================                        10.5   7.18  )-----------( 134      ( 01 Dec 2023 01:24 )             31.7     12    130<L>  |  101  |  44<H>  ----------------------------<  106<H>  3.6   |  17<L>  |  1.48<H>    Ca    9.2      01 Dec 2023 01:24  Phos  2.9       Mg     2.1         TPro  6.4  /  Alb  3.4  /  TBili  0.3  /  DBili  x   /  AST  22  /  ALT  32  /  AlkPhos  62  12    LIVER FUNCTIONS - ( 01 Dec 2023 01:24 )  Alb: 3.4 g/dL / Pro: 6.4 g/dL / ALK PHOS: 62 U/L / ALT: 32 U/L / AST: 22 U/L / GGT: x           PT/INR - ( 2023 00:39 )   PT: 13.3 sec;   INR: 1.22 ratio         PTT - ( 01 Dec 2023 08:20 )  PTT:65.1 sec    Lactate, Blood: 0.5 mmol/L (23 @ 01:25)  Lactate, Blood: 1.9 mmol/L (23 @ 05:37)    Urinalysis Basic - ( 01 Dec 2023 01:24 )    Color: x / Appearance: x / SG: x / pH: x  Gluc: 106 mg/dL / Ketone: x  / Bili: x / Urobili: x   Blood: x / Protein: x / Nitrite: x   Leuk Esterase: x / RBC: x / WBC x   Sq Epi: x / Non Sq Epi: x / Bacteria: x      ======================Micro/Rad/Cardio=================  Telemtry: Reviewed   EKG: Reviewed  CXR: Reviewed  Culture: Reviewed   Echo:   Cath: Cardiac Cath Lab - Adult:   [facility]  Department of Cardiology  [address] [place], [place] [address] [phone]  Cath Lab Report -- Comprehensive Report  Patient: LD VALENCIA  Study date: 2017  Account number: [id number]  MR number: [id number]  : 1965  Gender: Male  Race: W  Case Physician(s):  Jairon Holguin M.D.  Referring Physician:  Luc Lynn M.D.  INDICATIONS: Unstable angina - CCS4.  HISTORY: The patient has a history of coronary artery disease. The patient  hashypertension and medication-treated dyslipidemia.  PROCEDURE:  --  Left heart catheterization with ventriculography.  --  Left coronary angiography.  --  Right coronary angiography.  TECHNIQUE: The risks and alternatives of the procedures and conscious  sedation were explained to the patient and informed consent was obtained.  Cardiac catheterization performed electively.  Local anesthetic given. Right radial artery access. A 6FR PRELUDE KIT was  inserted in the vessel. Left heart catheterization. Ventriculography was  performed. A 5FR FR4.0 EXPO catheter was utilized. Left coronary artery  angiography. The vessel was injected utilizing a 5FR FL3.5 EXPO catheter.  Right coronary artery angiography. The vessel was injected utilizing a 5FR  FR4.0 EXPO catheter. RADIATION EXPOSURE: 1.1 min.  CONTRAST GIVEN: Omnipaque 55 ml.  MEDICATIONS GIVEN: Midazolam, 1 mg, IV. Fentanyl, 25 mcg, IV. Verapamil  (Isoptin, Calan, Covera), 2.5 mg, IA. Heparin, 3000 units, IA.  VENTRICLES: Global left ventricular function was moderately depressed. EF  estimated was 40 %.  CORONARY VESSELS: The coronary circulation is right dominant.  LM:   --  LM: Normal.  LAD:   --  Proximal LAD: There was a 50 % stenosis.  CX:   --  Circumflex: Normal.  RCA:   --  Mid RCA: There was a 40 % stenosis.  --  Distal RCA: There was a 50 % stenosis.  COMPLICATIONS: There were no complications.  DIAGNOSTIC RECOMMENDATIONS: The patient should continue with the present  medications.  Prepared and signed by  Jairon Holguin M.D.  Signed 2017 12:20:13  HEMODYNAMIC TABLES  Pressures:  Baseline/ Room Air  Pressures:  - HR: 78  Pressures:  - Rhythm:  Pressures:  -- Aortic Pressure (S/D/M): --/--/99  Pressures:  -- Left Ventricle (s/edp): 157/39/--  Outputs:  Baseline/ Room Air  Outputs:  -- CALCULATIONS[age]  Outputs:  -- CALCULATIONS: Body Surface Area: 2.05  Outputs:  -- CALCULATIONS: Height in cm: 175.00  Outputs:  -- CALCULATIONS: Sex: Male  Outputs:  -- CALCULATIONS: Weight in k.40 (17 @ 21:55)    ======================================================  PAST MEDICAL & SURGICAL HISTORY:  HTN (hypertension)    CAD (coronary artery disease)  ; stent    Intracranial hemorrhage      Respiratory arrest      Myocardial infarction, unspecified MI type, unspecified artery    History of coronary artery stent placement    Assessment and Plan:     Assessment:  58 male with HTN, CAD (s/p PCI ), HFrEF, CVA , and T2DM presenting with chest pressure and unknown tachycardia that was shocked x1, C  found to have in-stent restenosis of pLAD and  of RCA with elevated RA and PA pressures and severely decreased. Admitted to CICU for management of cardiogenic shock and ADHF requiring IABP  -, with hospital course c/b vfib arrest requiring reinsertion of IABP. Currently listed for transplant status 2.    Plan:  ====================== NEUROLOGY=====================  Anxiety  - No Seroquel/antipsychotics since vfib arrest and prolonged QTC  - Psych Eval, recommended SSRI, but pt. refused   - Psych recommending atarax PRN  - Continue to monitor mental status    PT/Conditioning  - Continue band exercised while on bedrest s/t IABP    ==================== RESPIRATORY======================  Acute Hypoxemic Respiratory Failure  - s/p x2 intubations for cardiogenic pulm edema and the in setting of cardiac arrest, resolved - extubated 11/10  - Currently on room air with spO2 mid 90s  - Continue incentive spirometry and monitoring of sp02    Asthma  - c/w albuterol, symbicort and spiriva  - On trelegy at home  - Continue to monitor SpO2 with goal >94%    ====================CARDIOVASCULAR==================  Vfib arrest i/s/o ischemia  - Lido gtt off   - PO Amio load - total of 5g per EP complete , continue PO amio  - Keep K > 4, Mag > 2.2     Cardiogenic shock requiring IABP (- , -)  - Likely 2/2 NSTEMI and ADHF  -  LHC: pLAD 100 % in-stent restenosis & mRCA, 100 %. PCWP 30. IABP placed.  -  TTE: LV dilated. EF 32 %. Regional WMAs present, mod (grade 2) LV diastolic dysfunction  -  TTE: EF 22% and + LV thrombus  -  s/p 500 cc bolus  - IABP swapped  to RFA, continue 1:1 support  - Off Milrinone gtt @ 7:30 am   - Currently on hydralazine 100 TID and ISD 40 TID for AL reduction  - [place] d/c'd due to elevated K levels  - c/w coreg 25 BID for GDMT  - Listed OHT status 2 , f/u HF recs    NSTEMI iso stent re-occlusion of pLAD and 100%  of RCA  - EKG on admission w/ LBBB  - DAPT: c/w ASA, Brilinta d/c'd per transplant w/u  - c/w lipitor 80  - cMR deferred given necessity of IABP  - CT sx not recommending CABG, undergoing AT eval    LV thrombus  - c/w heparin gtt    ===================== RENAL =========================  Non-oliguric ARIC   - Baseline Cr: 1-  - s/p 500 LR   - Renal US: no evidence of renal artery stenosis  - Trend BMP, lytes daily, replace as needed  - Continue Strict I/Os, avoid nephrotoxins    Mild Hyponatremia/Hyperkalemia iso ARIC and or new CKD baseline  - Continue fluid restriction   - Urea powder d/c'd per renal  - Trend daily  - Continue monitoring urine output, lytes, SCr/ BUN  - Replete lytes prn with goal K >4 and Mg >2    =============== GASTROINTESTINAL===================  Constipation/ileus, resolved  - s/p multiple BMs, symptoms improving   - c/w diet    ===================ENDO====================  Type 2 DM  - A1c 8.3  - Continue lantus, premeal, low ISS  - continue FS    ===================HEMATOLOGIC/ONC ===================  - H/H & plts stable  - Monitor H/H and plts  - VTE PPX: heparin gtt    ==================INFECTIOUS DISEASE================  # Leukocytosis  - Repeat BCx pending  - Monitor off abx at this time  - Monitor and trend WBC and temperature curve     # Enterococcus faecalis bacteremia, resolved  - BCx + for enterococcus faecalis x2, Staph epi x1 (likely contaminant)- pan sensitive   - Urine cx  + enterococcus faecalis  - BCx  NGTD  - IABP site swapped to RFA   - s/p Vancomycin 1g q12h (-)  - CT A/P negative for infectious pathology    # COVID, resolved  - Off airborne precautions     # Pre-transplant ID w/u   - Trend ID recs for serologies   - Colonoscopy  - normal   - Chest CT  - improved LLL aeration  - s/p immunizations      Patient requires continuous monitoring with bedside rhythm monitoring, pulse ox monitoring, and intermittent blood gas analysis. Care plan discussed with ICU care team. Patient remained critical and at risk for life threatening decompensation.  Patient seen, examined and plan discussed with CCU team during rounds.   I have personally provided 35 minutes of critical care time excluding time spent on separate procedures, in addition to initial critical care time provided by the CICU Attending, Dr. Delacruz    By signing my name below, I, Eleonora Marshall, attest that this documentation has been prepared under the direction and in the presence of KARYN Choi  Electronically signed: Rosalba Talavera, 23 @ 19:40  I, KARYN Choi, personally performed the services described in this documentation. all medical record entries made by the eileenibmartha were at my direction and in my presence. I have reviewed the chart and agree that the record reflects my personal performance and is accurate and complete  Electronically signed: KARYN Choi DEVORAH VALENCIA  MRN-[id number]  Patient is a 58y old  Male who presents with a chief complaint of pre advanced cardiac therapies evaluation (01 Dec 2023 15:06)    HPI:  59yo M w/ hx HTN, CAD w/ 1 stent in , ICH () presenting with abn ekg. Patient presented to [facility] where he was found to have STEMI, recommended to get cath however patient did not want to get it there so it left and came here.  Patient initially had cough, congestion, fever, was placed on antibiotics on .  Started feeling nauseous and had a presyncopal event after which he presented to ED last night.  Had chest pain as well.  Chest pain is midsternal.  Not currently having chest pain.  Received 4 aspirin 30 min pta. (2023 15:11)    Hospital Course:  24 HOUR EVENTS:  - no acute events     REVIEW OF SYSTEMS:    CONSTITUTIONAL: No weakness, fevers or chills  EYES/ENT: No visual changes;  No vertigo or throat pain   NECK: No pain or stiffness  RESPIRATORY: No cough, wheezing, hemoptysis; No shortness of breath  CARDIOVASCULAR: No chest pain or palpitations  GASTROINTESTINAL: No abdominal or epigastric pain. No nausea, vomiting, or hematemesis; No diarrhea or constipation. No melena or hematochezia.  GENITOURINARY: No dysuria, frequency or hematuria  NEUROLOGICAL: No numbness or weakness  SKIN: No itching, rashes    ICU Vital Signs Last 24 Hrs  T(C): 36.9 (01 Dec 2023 15:00), Max: 36.9 (01 Dec 2023 15:00)  T(F): 98.4 (01 Dec 2023 15:00), Max: 98.4 (01 Dec 2023 15:00)  HR: 77 (01 Dec 2023 18:00) (62 - 79)  BP: --  BP(mean): --  ABP: --  ABP(mean): --  RR: 16 (01 Dec 2023 18:00) (14 - 21)  SpO2: 96% (01 Dec 2023 18:00) (95% - 98%)    O2 Parameters below as of 01 Dec 2023 18:00  Patient On (Oxygen Delivery Method): room air        CVP(mm Hg): --  CO: --  CI: --  PA: --  PA(mean): --  PA(direct): --  PCWP: --  LA: --  RA: --  SVR: --  SVRI: --  PVR: --  PVRI: --  I&O's Summary    2023 07:  -  01 Dec 2023 07:00  --------------------------------------------------------  IN: 1159 mL / OUT: 2465 mL / NET: -1306 mL    01 Dec 2023 07:01  -  01 Dec 2023 19:40  --------------------------------------------------------  IN: 514 mL / OUT: 950 mL / NET: -436 mL      CAPILLARY BLOOD GLUCOSE    CAPILLARY BLOOD GLUCOSE      POCT Blood Glucose.: 108 mg/dL (01 Dec 2023 17:24)    PHYSICAL EXAM:  GENERAL: No acute distress, well-developed  HEAD:  Atraumatic, Normocephalic  EYES: EOMI, PERRLA, conjunctiva and sclera clear  NECK: Supple, no lymphadenopathy, no JVD  CHEST/LUNG: CTAB; No wheezes, rales, or rhonchi  HEART: Regular rate and rhythm. Normal S1/S2. No murmurs, rubs, or gallops  ABDOMEN: Soft, non-tender, non-distended; normal bowel sounds, no organomegaly  EXTREMITIES:  2+ peripheral pulses b/l, No clubbing, cyanosis, or edema  NEUROLOGY: A&O x 3, no focal deficits  SKIN: No rashes or lesions  ============================I/O===========================   I&O's Detail    2023 07:01  -  01 Dec 2023 07:00  --------------------------------------------------------  IN:    Heparin: 279 mL    Oral Fluid: 880 mL  Total IN: 1159 mL    OUT:    Voided (mL): 2465 mL  Total OUT: 2465 mL    Total NET: -1306 mL      01 Dec 2023 07:01  -  01 Dec 2023 19:40  --------------------------------------------------------  IN:    Heparin: 154 mL    Oral Fluid: 360 mL  Total IN: 514 mL    OUT:    Voided (mL): 950 mL  Total OUT: 950 mL    Total NET: -436 mL      ============================ LABS =========================                        10.5   7.18  )-----------( 134      ( 01 Dec 2023 01:24 )             31.7     12    130<L>  |  101  |  44<H>  ----------------------------<  106<H>  3.6   |  17<L>  |  1.48<H>    Ca    9.2      01 Dec 2023 01:24  Phos  2.9       Mg     2.1         TPro  6.4  /  Alb  3.4  /  TBili  0.3  /  DBili  x   /  AST  22  /  ALT  32  /  AlkPhos  62  12    LIVER FUNCTIONS - ( 01 Dec 2023 01:24 )  Alb: 3.4 g/dL / Pro: 6.4 g/dL / ALK PHOS: 62 U/L / ALT: 32 U/L / AST: 22 U/L / GGT: x           PT/INR - ( 2023 00:39 )   PT: 13.3 sec;   INR: 1.22 ratio         PTT - ( 01 Dec 2023 08:20 )  PTT:65.1 sec    Lactate, Blood: 0.5 mmol/L (23 @ 01:25)  Lactate, Blood: 1.9 mmol/L (23 @ 05:37)    Urinalysis Basic - ( 01 Dec 2023 01:24 )    Color: x / Appearance: x / SG: x / pH: x  Gluc: 106 mg/dL / Ketone: x  / Bili: x / Urobili: x   Blood: x / Protein: x / Nitrite: x   Leuk Esterase: x / RBC: x / WBC x   Sq Epi: x / Non Sq Epi: x / Bacteria: x      ======================Micro/Rad/Cardio=================  Telemtry: Reviewed   EKG: Reviewed  CXR: Reviewed  Culture: Reviewed   Echo:   Cath: Cardiac Cath Lab - Adult:   [facility]  Department of Cardiology  [address] [place], [place] [address] [phone]  Cath Lab Report -- Comprehensive Report  Patient: LD VALENCIA  Study date: 2017  Account number: [id number]  MR number: [id number]  : 1965  Gender: Male  Race: W  Case Physician(s):  Jairon Holguin M.D.  Referring Physician:  Luc Lynn M.D.  INDICATIONS: Unstable angina - CCS4.  HISTORY: The patient has a history of coronary artery disease. The patient  hashypertension and medication-treated dyslipidemia.  PROCEDURE:  --  Left heart catheterization with ventriculography.  --  Left coronary angiography.  --  Right coronary angiography.  TECHNIQUE: The risks and alternatives of the procedures and conscious  sedation were explained to the patient and informed consent was obtained.  Cardiac catheterization performed electively.  Local anesthetic given. Right radial artery access. A 6FR PRELUDE KIT was  inserted in the vessel. Left heart catheterization. Ventriculography was  performed. A 5FR FR4.0 EXPO catheter was utilized. Left coronary artery  angiography. The vessel was injected utilizing a 5FR FL3.5 EXPO catheter.  Right coronary artery angiography. The vessel was injected utilizing a 5FR  FR4.0 EXPO catheter. RADIATION EXPOSURE: 1.1 min.  CONTRAST GIVEN: Omnipaque 55 ml.  MEDICATIONS GIVEN: Midazolam, 1 mg, IV. Fentanyl, 25 mcg, IV. Verapamil  (Isoptin, Calan, Covera), 2.5 mg, IA. Heparin, 3000 units, IA.  VENTRICLES: Global left ventricular function was moderately depressed. EF  estimated was 40 %.  CORONARY VESSELS: The coronary circulation is right dominant.  LM:   --  LM: Normal.  LAD:   --  Proximal LAD: There was a 50 % stenosis.  CX:   --  Circumflex: Normal.  RCA:   --  Mid RCA: There was a 40 % stenosis.  --  Distal RCA: There was a 50 % stenosis.  COMPLICATIONS: There were no complications.  DIAGNOSTIC RECOMMENDATIONS: The patient should continue with the present  medications.  Prepared and signed by  Jairon Holguin M.D.  Signed 2017 12:20:13  HEMODYNAMIC TABLES  Pressures:  Baseline/ Room Air  Pressures:  - HR: 78  Pressures:  - Rhythm:  Pressures:  -- Aortic Pressure (S/D/M): --/--/99  Pressures:  -- Left Ventricle (s/edp): 157/39/--  Outputs:  Baseline/ Room Air  Outputs:  -- CALCULATIONS[age]  Outputs:  -- CALCULATIONS: Body Surface Area: 2.05  Outputs:  -- CALCULATIONS: Height in cm: 175.00  Outputs:  -- CALCULATIONS: Sex: Male  Outputs:  -- CALCULATIONS: Weight in k.40 (17 @ 21:55)    ======================================================  PAST MEDICAL & SURGICAL HISTORY:  HTN (hypertension)    CAD (coronary artery disease)  ; stent    Intracranial hemorrhage      Respiratory arrest      Myocardial infarction, unspecified MI type, unspecified artery    History of coronary artery stent placement    Assessment and Plan:     Assessment:  58 male with HTN, CAD (s/p PCI ), HFrEF, CVA , and T2DM presenting with chest pressure and unknown tachycardia that was shocked x1, C  found to have in-stent restenosis of pLAD and  of RCA with elevated RA and PA pressures and severely decreased. Admitted to CICU for management of cardiogenic shock and ADHF requiring IABP  -, with hospital course c/b vfib arrest requiring reinsertion of IABP. Currently listed for transplant status 2.    Plan:  ====================== NEUROLOGY=====================  Anxiety  - No Seroquel/antipsychotics since vfib arrest and prolonged QTC  - Psych Eval, recommended SSRI, but pt. refused   - Psych recommending atarax PRN  - Continue to monitor mental status    PT/Conditioning  - Continue band exercised while on bedrest s/t IABP    ==================== RESPIRATORY======================  Acute Hypoxemic Respiratory Failure  - s/p x2 intubations for cardiogenic pulm edema and the in setting of cardiac arrest, resolved - extubated 11/10  - Currently on room air with spO2 mid 90s  - Continue incentive spirometry and monitoring of sp02    Asthma  - c/w albuterol, symbicort and spiriva  - On trelegy at home  - Continue to monitor SpO2 with goal >94%    ====================CARDIOVASCULAR==================  Vfib arrest i/s/o ischemia  - Lido gtt off   - PO Amio load - total of 5g per EP complete , continue PO amio  - Keep K > 4, Mag > 2.2     Cardiogenic shock requiring IABP (- , -)  - Likely 2/2 NSTEMI and ADHF  -  LHC: pLAD 100 % in-stent restenosis & mRCA, 100 %. PCWP 30. IABP placed.  -  TTE: LV dilated. EF 32 %. Regional WMAs present, mod (grade 2) LV diastolic dysfunction  -  TTE: EF 22% and + LV thrombus  -  s/p 500 cc bolus  - IABP swapped  to RFA, continue 1:1 support  - Off Milrinone gtt @ 7:30 am   - Currently on hydralazine 100 TID and ISD 40 TID for AL reduction  - [place] d/c'd due to elevated K levels  - c/w coreg 25 BID for GDMT  - Listed OHT status 2 , f/u HF recs    NSTEMI iso stent re-occlusion of pLAD and 100%  of RCA  - EKG on admission w/ LBBB  - DAPT: c/w ASA, Brilinta d/c'd per transplant w/u  - c/w lipitor 80  - cMR deferred given necessity of IABP  - CT sx not recommending CABG, undergoing AT eval    LV thrombus  - c/w heparin gtt    ===================== RENAL =========================  Non-oliguric ARIC   - Baseline Cr: 1-  - s/p 500 LR   - Renal US: no evidence of renal artery stenosis  - Trend BMP, lytes daily, replace as needed  - Continue Strict I/Os, avoid nephrotoxins    Mild Hyponatremia/Hyperkalemia iso ARIC and or new CKD baseline  - Continue fluid restriction   - Urea powder d/c'd per renal  - Trend daily  - Continue monitoring urine output, lytes, SCr/ BUN  - Replete lytes prn with goal K >4 and Mg >2    =============== GASTROINTESTINAL===================  Constipation/ileus, resolved  - s/p multiple BMs, symptoms improving   - c/w diet    ===================ENDO====================  Type 2 DM  - A1c 8.3  - Continue lantus, premeal, low ISS  - continue FS    ===================HEMATOLOGIC/ONC ===================  - H/H & plts stable  - Monitor H/H and plts  - VTE PPX: heparin gtt    ==================INFECTIOUS DISEASE================  # Leukocytosis  - Repeat BCx pending  - Monitor off abx at this time  - Monitor and trend WBC and temperature curve     # Enterococcus faecalis bacteremia, resolved  - BCx + for enterococcus faecalis x2, Staph epi x1 (likely contaminant)- pan sensitive   - Urine cx  + enterococcus faecalis  - BCx  NGTD  - IABP site swapped to RFA   - s/p Vancomycin 1g q12h (-)  - CT A/P negative for infectious pathology    # COVID, resolved  - Off airborne precautions     # Pre-transplant ID w/u   - Trend ID recs for serologies   - Colonoscopy  - normal   - Chest CT  - improved LLL aeration  - s/p immunizations      Patient requires continuous monitoring with bedside rhythm monitoring, pulse ox monitoring, and intermittent blood gas analysis. Care plan discussed with ICU care team. Patient remained critical and at risk for life threatening decompensation.  Patient seen, examined and plan discussed with CCU team during rounds.   I have personally provided 35 minutes of critical care time excluding time spent on separate procedures, in addition to initial critical care time provided by the CICU Attending, Dr. Delacruz    By signing my name below, I, Eleonora Marshall, attest that this documentation has been prepared under the direction and in the presence of KARYN Choi  Electronically signed: Rosalba Talavera, 23 @ 19:40  I, KARYN Choi, personally performed the services described in this documentation. all medical record entries made by the eileenibmartha were at my direction and in my presence. I have reviewed the chart and agree that the record reflects my personal performance and is accurate and complete  Electronically signed: KARYN Choi

## 2023-12-01 NOTE — PROGRESS NOTE ADULT - PROBLEM SELECTOR PLAN 1
- L IABP at 1:1, placed 11/9. Exchange to LFA given high grade bacteremia on 11/20. On AC with Heparin infusion (also for LV thrombus)  - Continue Coreg 25 mg BID hold for MAP <65  - Continue HDZN 100 mg TID and ISDN 40 mg TID, hold for MAP <65  - AT evaluation: Accepted for transplant, currently listed UNOS Status 2. ABO A. PRA 0% 11/13. Last Brilinta dose was on 11/13  - Please keep primary Dr. Banuelos [phone] in the loop per family request. - L IABP at 1:1, placed 11/9. Exchange to LFA given high grade bacteremia on 11/20. On AC with Heparin infusion (also for LV thrombus)  - Continue Coreg 25 mg BID hold for MAP <65  - Continue HDZN 100 mg TID and ISDN 40 mg TID, hold for MAP <65  - Can consider adding MRA once cretainine stabilizes  - AT evaluation: Accepted for transplant, currently listed UNOS Status 2. ABO A. PRA 0% 11/13. Last Brilinta dose was on 11/13  - Please keep primary Dr. Banuelos [phone] in the loop per family request.

## 2023-12-01 NOTE — PROGRESS NOTE ADULT - PROBLEM SELECTOR PLAN 1
ARIC: in the setting of heart failure, COVID infection. At the time of presentation Scr is 1.25. Started to worsen on 11/4 and peaked at 4.07 11/10 and gradually improved to 1.8 on 11/18. Pt had LHC on 11/1. He has hx of DM with proteinuria of 684 but most recent UA negative. Primary team is planning to get cardiac transplant eval. Remains nonoliguric, UOP 1.7L/24h. HIV, Hep B, Hep C negative. A1c - 8.3. Complements are not low. BETZAIDA and ANCA negative. Light chains - not significant. UPCR <0.1, PLA2R ab - negative, Anti GBM abs - negative. UPEP negative.   Duplex - normal, symmetric blood flow throughout both kidneys. Velocities and RIs are limited by IABP.   Serum creatinine has worsened slightly  to 1.9  then today it improved to 1.52---> 1.48 today. Pt's Hemodynamics are managed by cardio team and pt remained on IABP.    PLAN:  No indication for dialysis.  Avoid nephrotoxic agents. Dose meds per eGFR.

## 2023-12-01 NOTE — PROGRESS NOTE ADULT - ASSESSMENT
59 yo M with PMHx of HTN, CAD w/ 1 stent in 2009, ICH (2008) presented on 11/1 with abn ekg. Patient presented to [facility] where he was found to have STEMI, recommended to get cath however patient did not want to get it there so he left and came here.   EKG here with ST depression in lateral leads and elevation in anterior leads   Prior to [facility] found to be tachycardic, dyspneic, intubated   [facility] 11/1 with chronic total occlusion of LAD and RCA, with elevated RA and PA pressures  TTE 11/1 with severely decreased EF 32%, s/p IABP 11/1    RVP (11/1) Positive for COVID19  RVP (11/10) Negative  BCx (11/2, 11/4) NGTD  ETT Culture (11/2) NGTD  MRSA/MSSA PCR (11/2, 11/10) Negative  UA (11/10) 1 WBC    CXR (11/10) Clear Lungs  TTE (11/2) Left ventricular thrombus.    #Enterococcus Bacteremia, Leukocytosis, Pre-Heart Transplant Evaluation Serologies  Concern for either central line or urinary source  CT A/P Noncontrast (11/18) No acute process  Given mixed cultures with Staph epi and Enterococcus faecalis would continue Vancomycin   Patient's penicillin allergy is questionable and patient does not recall reaction.   -- susceptibility of Enterococcus faecalis shows Vanco and Ampi sensitive  --Follow up on repeat blood cultures from 11/18 are negative  -- Completed ten-days of IV Vancomycin last dose received on 11/27    Given evidence of clearing of bacteremia could move forward from an ID perspective to pursue advanced cardiac therapies- listed for transplant status 2E      #Encounter to Vaccinate Patient  COVID19: COVID19 Infection This Admission. Would consider Vaccination in ~3 months  Influenza: declined  Pneumococcal: Would benefit from PCV20  HAV: received first dose 11/24  HBV: received first dose Heplisav 11/24  MMR: Not Mumps Immune, if >1 month until transplant would consider MMR dose  Varicella: Immune  Shingles: recommend Shingrix series  Tdap: received Tdap booster this admission      # local reactions to vaccines with dermatitis type rash  resolving      Thank you for involving us in the care of this patient  Transplant ID will continue to follow  Please call or page with additional questions  Pager; #[phone]  Teams: from 8 am to 5 pm  Brandi Silva MD

## 2023-12-01 NOTE — PROGRESS NOTE ADULT - PROBLEM SELECTOR PLAN 6
- Downtrending  - Please send HIT and HAIDER - Downtrending  - Please send HIT and HAIDER  - Recommend to minimize blood draws. Obtain CBC MWF and BMP daily for now.

## 2023-12-01 NOTE — PROGRESS NOTE ADULT - ASSESSMENT
Assessment:  58 male with HTN, CAD (s/p PCI 2008), HFrEF, CVA 2018, and T2DM presenting with chest pressure and unknown tachycardia that was shocked x1, C 11/1 found to have in-stent restenosis of pLAD and  of RCA with elevated RA and PA pressures and severely decreased. Admitted to CICU for management of cardiogenic shock and ADHF requiring IABP 11/1 -11/7, with hospital course c/b vfib arrest requiring reinsertion of IABP. Currently listed for transplant status 2.    Plan:  ====================== NEUROLOGY=====================  Anxiety  - No Seroquel/antipsychotics since vfib arrest and prolonged QTC  - Psych Eval, recommended SSRI, but pt. refused   - Psych recommending atarax PRN  - Continue to monitor mental status    PT/Conditioning  - Continue band exercised while on bedrest s/t IABP    ==================== RESPIRATORY======================  Acute Hypoxemic Respiratory Failure  - s/p x2 intubations for cardiogenic pulm edema and the in setting of cardiac arrest, resolved - extubated 11/10  - Currently on room air with spO2 mid 90s  - Continue incentive spirometry and monitoring of sp02    Asthma  - c/w albuterol, symbicort and spiriva  - On trelegy at home  - Continue to monitor SpO2 with goal >94%    ====================CARDIOVASCULAR==================  Vfib arrest i/s/o ischemia  - Lido gtt off 11/13  - PO Amio load - total of 5g per EP complete 11/17, continue PO amio  - Keep K > 4, Mag > 2.2     Cardiogenic shock requiring IABP (11/1- 11/7, 11/9-)  - Likely 2/2 NSTEMI and ADHF  - 11/1 LHC: pLAD 100 % in-stent restenosis & mRCA, 100 %. PCWP 30. IABP placed.  - 11/1 TTE: LV dilated. EF 32 %. Regional WMAs present, mod (grade 2) LV diastolic dysfunction  - 11/2 TTE: EF 22% and + LV thrombus  - 11/29 s/p 500 cc bolus  - IABP swapped 11/20 to RFA, continue 1:1 support  - Off Milrinone gtt @ 7:30 am 11/11  - Currently on hydralazine 100 TID and ISD 40 TID for AL reduction  - James d/c'd due to elevated K levels  - c/w coreg 25 BID for GDMT  - Listed OHT status 2 11/22, f/u HF recs    NSTEMI iso stent re-occlusion of pLAD and 100%  of RCA  - EKG on admission w/ LBBB  - DAPT: c/w ASA, Brilinta d/c'd per transplant w/u  - c/w lipitor 80  - cMR deferred given necessity of IABP  - CT sx not recommending CABG, undergoing AT eval    LV thrombus  - c/w heparin gtt    ===================== RENAL =========================  Non-oliguric ARIC   - Baseline Cr: 1-1.22  - s/p 500 LR 11/29  - Renal US: no evidence of renal artery stenosis  - Trend BMP, lytes daily, replace as needed  - Continue Strict I/Os, avoid nephrotoxins    Mild Hyponatremia/Hyperkalemia iso ARIC and or new CKD baseline  - Continue fluid restriction   - Urea powder d/c'd per renal  - Trend daily  - Continue monitoring urine output, lytes, SCr/ BUN  - Replete lytes prn with goal K >4 and Mg >2    =============== GASTROINTESTINAL===================  Constipation/ileus, resolved  - s/p multiple BMs, symptoms improving   - c/w diet    ===================ENDO====================  Type 2 DM  - A1c 8.3  - Continue lantus, premeal, low ISS  - continue FS    ===================HEMATOLOGIC/ONC ===================  - H/H & plts stable  - Monitor H/H and plts  - VTE PPX: heparin gtt    ==================INFECTIOUS DISEASE================  # Leukocytosis  - Repeat BCx pending  - Monitor off abx at this time  - Monitor and trend WBC and temperature curve     # Enterococcus faecalis bacteremia, resolved  - BCx 11/17+ for enterococcus faecalis x2, Staph epi x1 (likely contaminant)- pan sensitive   - Urine cx 11/18 + enterococcus faecalis  - BCx 11/18 NGTD  - IABP site swapped to RFA 11/20  - s/p Vancomycin 1g q12h (11/18-11/27)  - CT A/P negative for infectious pathology    # COVID, resolved  - Off airborne precautions 11/11    # Pre-transplant ID w/u   - Trend ID recs for serologies   - Colonoscopy 11/17 - normal   - Chest CT 11/17 - improved LLL aeration  - s/p immunizations    Dispo: Maintain in ICU while IABP in place    Patient requires continuous monitoring with bedside rhythm monitoring, arterial line, pulse oximetry, ventilator monitoring and intermittent blood gas analysis.  Care plan discussed with ICU care team.  I have spent 35 minutes providing critical care, in addition to initial critical time provided by CICU attending Dr. Delacruz, re-evaluated multiple times during the day.    Rita Henry [facility]

## 2023-12-01 NOTE — PROGRESS NOTE ADULT - ATTENDING COMMENTS
Scr returned to baseline today    Twice recently had incorrect increases- very transient    Hyponatremia- 130- No D5 diluent sources   Eating better now    should cont. to improve

## 2023-12-01 NOTE — PROGRESS NOTE ADULT - SUBJECTIVE AND OBJECTIVE BOX
INFECTIOUS DISEASES FOLLOW UP    This is a follow up note for this  58yMale with  Non-ST elevation myocardial infarction (NSTEMI)    AFVSS, bilateral arm rashes improving, no new rash  no cough, SOB, diarrhea      Vital Signs Last 24 Hrs  T(C): 36.9 (01 Dec 2023 15:00), Max: 36.9 (01 Dec 2023 15:00)  T(F): 98.4 (01 Dec 2023 15:00), Max: 98.4 (01 Dec 2023 15:00)  HR: 73 (01 Dec 2023 15:00) (62 - 79)  BP: --  BP(mean): --  RR: 16 (01 Dec 2023 15:00) (14 - 21)  SpO2: 96% (01 Dec 2023 15:00) (95% - 98%)    Parameters below as of 01 Dec 2023 15:00  Patient On (Oxygen Delivery Method): room air          PHYSICAL EXAM:  Constitutional:no acute distress  Eyes:MARVEL, EOMI  Ear/Nose/Throat: no oral lesions, 	  Respiratory: clear BL  Cardiovascular: S1S2  Gastrointestinal:soft, (+) BS, no tenderness  Extremities:no e/e/c IABP site CDI  No Lymphadenopathy  IV sites not inflammed.      ____________________________________________________  ROS  GENERAL: denies chills, , night sweats, weight loss.   PSYCH: denies depression, anxiety, suicidal ideation, hallucination, and delusions  SKIN: no rash or lesions; no color changes, no abnormal nevi,no  dryness, and nojaundice    EYES: denies visual changes, floaters, pain, inflammation, blurred vision, and discharge  ENT: denies tinnitus, vertigo, epistaxis, oral lesion, and decreased acuity  PULM: denies, hemoptysis, pleurisy  CVS: denies angina, palpitations,+ orthopnea, no syncope, or heart murmur  GI: denies constipation, diarrhea, melena, abdominal pain, nausea.   : denies dysuria, frequency, discharge, incontinence, stones or macroscopic hematuria  MS: no arthralgias, no erythema or swelling, no myalgias, noedema, or lower back pain.   CNS: denies numbness, dizziness, seizure, or tremor  ENDO: denies heat/cold intolerance, polyuria, polydipsia, malaise.    HEME: denies bruising, bleeding, lymphadenopathy, anemia, and calf pain    Allergies  penicillins (Unknown)    __________________________________________________  MEDS:  MEDICATIONS  (STANDING):  aMIOdarone    Tablet    aMIOdarone    Tablet 200 daily  aspirin  chewable 81 daily  atorvastatin 80 at bedtime  budesonide  80 MICROgram(s)/formoterol 4.5 MICROgram(s) Inhaler 2 two times a day  carvedilol 25 every 12 hours  heparin  Infusion 1300 <Continuous>  hydrALAZINE 100 three times a day  hydrOXYzine hydrochloride 25 two times a day PRN  insulin glargine Injectable (LANTUS) 12 at bedtime  insulin lispro (ADMELOG) corrective regimen sliding scale  three times a day before meals  insulin lispro (ADMELOG) corrective regimen sliding scale  at bedtime  insulin lispro Injectable (ADMELOG) 9 three times a day before meals  isosorbide   dinitrate Tablet (ISORDIL) 40 three times a day  polyethylene glycol 3350 17 two times a day  senna 2 at bedtime    _________________________________________________  ANTIMICOBIALS      GENERAL LABS              10.5                 130  | 17   | 44           7.18  >-----------< 134     ------------------------< 106                   31.7                 3.6  | 101  | 1.48                                         Ca 9.2   Mg 2.1   Ph 2.9      Vancomycin Level, Trough: 12.5 (11-26 @ 04:30)  Vancomycin Level, Random: 12.2 (11-25 @ 04:56)  Vancomycin Level, Trough: 15.5 (11-24 @ 06:15)  Vancomycin Level, Trough: 13.8 (11-23 @ 04:44)  Vancomycin Level, Trough: 9.5 (11-22 @ 14:35)  Vancomycin Level, Random: 15.2 (11-22 @ 04:17)  Vancomycin Level, Random: 14.1 (11-21 @ 03:50)  Vancomycin Level, Random: 14.8 (11-20 @ 00:09)  Vancomycin Level, Random: 12.9 (11-19 @ 02:54)    Urinalysis Basic - ( 01 Dec 2023 01:24 )    Color: x / Appearance: x / SG: x / pH: x  Gluc: 106 mg/dL / Ketone: x  / Bili: x / Urobili: x   Blood: x / Protein: x / Nitrite: x   Leuk Esterase: x / RBC: x / WBC x   Sq Epi: x / Non Sq Epi: x / Bacteria: x        _________________________________________________  MICROBIOLOGY  -----------    Culture - Blood (collected 30 Nov 2023 00:43)  Source: .Blood Blood  Preliminary Report (01 Dec 2023 03:01):    No growth at 24 hours    Culture - Blood (collected 29 Nov 2023 13:07)  Source: .Blood Blood-Venous  Preliminary Report (30 Nov 2023 17:02):    No growth at 24 hours

## 2023-12-01 NOTE — PROGRESS NOTE ADULT - PROBLEM SELECTOR PLAN 4
- Cr on admission 1.2, peaked to 4  - Support as above.  - Improving  - Appreciate CardioRenal input

## 2023-12-01 NOTE — PROGRESS NOTE ADULT - NUTRITIONAL ASSESSMENT
Diet, Regular:   Consistent Carbohydrate {Evening Snack} (CSTCHOSN)  Low Sodium (11-17-23 @ 13:54) [Active]
Diet, Consistent Carbohydrate w/Evening Snack:   1200mL Fluid Restriction ([id number]) (11-29-23 @ 08:32) [Active]
Diet, NPO:      Special Instructions for Nursing:  Except medications (11-09-23 @ 03:00) [Active]
Diet, Regular:   Consistent Carbohydrate {Evening Snack} (CSTCHOSN)  Low Sodium (11-14-23 @ 11:53) [Active]
Diet, Regular:   Consistent Carbohydrate {Evening Snack} (CSTCHOSN)  Low Sodium (11-17-23 @ 13:54) [Active]
Diet, Regular:   Consistent Carbohydrate {No Snacks} (CSTCHO)  DASH/TLC {Sodium & Cholesterol Restricted} (DASH) (11-04-23 @ 14:45) [Active]
Diet, Regular:   Consistent Carbohydrate {No Snacks} (CSTCHO)  DASH/TLC {Sodium & Cholesterol Restricted} (DASH) (11-04-23 @ 14:45) [Active]
Diet, Consistent Carbohydrate w/Evening Snack:   1200mL Fluid Restriction ([id number]) (11-29-23 @ 08:32) [Active]
Diet, Regular:   Consistent Carbohydrate {Evening Snack} (CSTCHOSN)  1200mL Fluid Restriction ([id number])  No Concentrated Potassium  Low Sodium (11-23-23 @ 08:09) [Active]
Diet, Soft and Bite Sized:   Consistent Carbohydrate {No Snacks} (CSTCHO)  DASH/TLC {Sodium & Cholesterol Restricted} (DASH) (11-12-23 @ 21:56) [Active]
Diet, Regular:   Consistent Carbohydrate {Evening Snack} (CSTCHOSN)  1200mL Fluid Restriction ([id number])  Low Sodium (11-25-23 @ 08:00) [Active]

## 2023-12-01 NOTE — PROGRESS NOTE ADULT - SUBJECTIVE AND OBJECTIVE BOX
LD VALENCIA  58y Male  MRN:[id number]    Patient is a 58y old  Male who presents with a chief complaint of NSTEMI (01 Nov 2023 20:29)    HPI:  57yo M w/ hx HTN, CAD w/ 1 stent in 2009, ICH (2008) presenting with abn ekg. Patient presented to [facility] where he was found to have STEMI, recommended to get cath however patient did not want to get it there so it left and came here.  Patient initially had cough, congestion, fever, was placed on antibiotics on Sunday.  Started feeling nauseous and had a presyncopal event after which he presented to ED last night.  Had chest pain as well.  Chest pain is midsternal.  Not currently having chest pain.  Received 4 aspirin 30 min pta. (01 Nov 2023 15:11)      Patient seen and evaluated at bedside in CCU. interval events noted    Interval HPI: remain in ccu. IABP in place        PAST MEDICAL & SURGICAL HISTORY:  HTN (hypertension)      CAD (coronary artery disease)  2009; stent      Intracranial hemorrhage  2008      Respiratory arrest  december 1st      Myocardial infarction, unspecified MI type, unspecified artery      History of coronary artery stent placement          REVIEW OF SYSTEMS:  as per hpi     VITALS:   ICU Vital Signs Last 24 Hrs  T(C): 36.8 (01 Dec 2023 11:00), Max: 36.8 (30 Nov 2023 14:00)  T(F): 98.3 (01 Dec 2023 11:00), Max: 98.3 (30 Nov 2023 14:00)  HR: 79 (01 Dec 2023 11:00) (62 - 80)  BP: --  BP(mean): --  ABP: --  ABP(mean): --  RR: 16 (01 Dec 2023 11:00) (15 - 26)  SpO2: 96% (01 Dec 2023 11:00) (95% - 98%)    O2 Parameters below as of 01 Dec 2023 10:00  Patient On (Oxygen Delivery Method): room air              PHYSICAL EXAM:  GENERAL: NAD, comfortable   HEAD:  Atraumatic, Normocephalic  EYES: EOMI, PERRLA, conjunctiva and sclera clear  NECK: Supple, No JVD   CHEST/LUNG: Clear to auscultation bilaterally; No wheeze  HEART: Regular rate and rhythm; No murmurs, rubs, or gallops  ABDOMEN: Soft, Nontender, Nondistended; Bowel sounds present  EXTREMITIES:  2+ Peripheral Pulses, No clubbing, cyanosis, or edema  NEUROLOGY: nonfocal  SKIN: No rashes or lesions  +iabp    Consultant(s) Notes Reviewed:  [x ] YES  [ ] NO  Care Discussed with Consultants/Other Providers [ x] YES  [ ] NO    MEDS:   MEDICATIONS  (STANDING):  aMIOdarone    Tablet 200 milliGRAM(s) Oral daily  aMIOdarone    Tablet   Oral   aspirin  chewable 81 milliGRAM(s) Oral daily  atorvastatin 80 milliGRAM(s) Oral at bedtime  budesonide  80 MICROgram(s)/formoterol 4.5 MICROgram(s) Inhaler 2 Puff(s) Inhalation two times a day  carvedilol 25 milliGRAM(s) Oral every 12 hours  chlorhexidine 2% Cloths 1 Application(s) Topical daily  heparin  Infusion 1300 Unit(s)/Hr (14 mL/Hr) IV Continuous <Continuous>  hydrALAZINE 100 milliGRAM(s) Oral three times a day  insulin glargine Injectable (LANTUS) 12 Unit(s) SubCutaneous at bedtime  insulin lispro (ADMELOG) corrective regimen sliding scale   SubCutaneous three times a day before meals  insulin lispro (ADMELOG) corrective regimen sliding scale   SubCutaneous at bedtime  insulin lispro Injectable (ADMELOG) 9 Unit(s) SubCutaneous three times a day before meals  isosorbide   dinitrate Tablet (ISORDIL) 40 milliGRAM(s) Oral three times a day  polyethylene glycol 3350 17 Gram(s) Oral two times a day  senna 2 Tablet(s) Oral at bedtime    MEDICATIONS  (PRN):  hydrOXYzine hydrochloride 25 milliGRAM(s) Oral two times a day PRN Anxiety      ALLERGIES:  penicillins (Unknown)      LABS:                                               10.5   7.18  )-----------( 134      ( 01 Dec 2023 01:24 )             31.7   12-01    130<L>  |  101  |  44<H>  ----------------------------<  106<H>  3.6   |  17<L>  |  1.48<H>    Ca    9.2      01 Dec 2023 01:24  Phos  2.9     12-01  Mg     2.1     12-01    TPro  6.4  /  Alb  3.4  /  TBili  0.3  /  DBili  x   /  AST  22  /  ALT  32  /  AlkPhos  62  12-01      < from: CT Abdomen and Pelvis No Cont (11.28.23 @ 03:38) >  IMPRESSION:  Mildly dilated colon and prominent but not overly dilated small bowel   with air-fluid levels. No discrete transition point. Findings are   suggestive of ileus.    Intra-aortic balloon pump with the inferior marker at the level of the   inferior mesenteric artery and the balloon overlying the origins of the   renal arteries, celiac artery, and superior mesenteric artery. Consider   repositioning. Concern for IABP positioning was discussed with LANETTE Hawthorne   on 11/28/2023 at 3:42 AM by Dr. Shepard.    < end of copied text >          < from: Colonoscopy (11.17.23 @ 10:35) >                                                                                                        Impression:          - The entire examined colon is normal on direct and retroflexion views.                       - No specimens collected.  Recommendation:      - Resume previous diet today.                       - No large polyps or masses detected, No objection from GI standpoint to                 proceed with heart transplantation/advanced heart therapies.                       - Please call back should any further questions or concerns arise.    < end of copied text >     < from: Xray Chest 1 View- PORTABLE-Urgent (11.01.23 @ 07:42) >    IMPRESSION:  Clear lungs.    ---End of Report ---        < end of copied text >  < from: TTE W or WO Ultrasound Enhancing Agent (11.01.23 @ 10:23) >  _____________________________     CONCLUSIONS:      1. Left ventricular cavity is moderately dilated. Left ventricular wall thickness is normal. Left ventricular systolic function is severely decreased with an ejection fraction of 32 % by Chinchilla's method of disks. Regional wall motion abnormalities present.   2. Multiple segmental abnormalities exist. See findings.   3. There is moderate (grade 2) left ventricular diastolic dysfunction, with indeterminant filling pressure.   4. Normal right ventricular cavity size, wall thickness, and systolic function.   5. No significant valvular disease.   6. No pericardial effusion seen.   7. Compared to the transthoracic echocardiogram performed on 1/25/2017 the areas of akinesis are unchanged but there has been a decline in LV systolic function with new areas of hypokinesis.    __________________________________________________________________    < end of copied text >  < from: TTE Limited W or WO Ultrasound Enhancing Agent (11.02.23 @ 07:41) >  __________________________     CONCLUSIONS:      1. After obtaining consent, Definity ultrasound enhancing agent was given for enhanced left ventricular opacification and improved delineation of the left ventricular endocardial borders. Left ventricular systolic function is severely decreased with a calculated ejection fraction of 22 % by the Chinchilla's biplane method of disks. There is a left ventricular thrombus.   2. Findings were discussed with Litzy BOSS on 11/2/2023 at 8.49am.   3. There is a left ventricular thrombus.    _________________________________________________________________    < end of copied text >

## 2023-12-01 NOTE — PROGRESS NOTE ADULT - ASSESSMENT
58 yrs old male w/ hx of HFrEF (LVIDd 6.4 cm, LVEF 32%), CAD s/p PCI (2008), HTN, DMT2 (A1c 8.3%) and CVA s/p TPA (2018), recently treated for PNA who initially presented to [facility] via EMS after syncope reportedly requiring defibrilation. Treated for ACS but left AMA to come to [facility]. Pt has covid and was placed on IABP for hypotension. Now being evaluated for Heart transplant Vs LVAD placement. Nephrology consulted for ARIC.

## 2023-12-01 NOTE — PROGRESS NOTE ADULT - SUBJECTIVE AND OBJECTIVE BOX
PATIENT:  DEVORAH VALENCIA  [id number]    CHIEF COMPLAINT:  Patient is a 58y old male who presents with a chief complaint of pre advanced cardiac therapies evaluation (30 Nov 2023 15:39)      INTERVAL HISTORY/OVERNIGHT EVENTS:      REVIEW OF SYSTEMS:    Constitutional:     [X] negative [ ] fevers [ ] chills [ ] weight loss [ ] weight gain  HEENT:                  [X] negative [ ] dry eyes [ ] eye irritation [ ] postnasal drip [ ] nasal congestion  CV:                         [X] negative  [ ] chest pain [ ] orthopnea [ ] palpitations [ ] murmur  Resp:                     [X] negative [ ] cough [ ] shortness of breath [ ] dyspnea [ ] wheezing [ ] sputum [ ] hemoptysis  GI:                          [X] negative [ ] nausea [ ] vomiting [ ] diarrhea [ ] constipation [ ] abd pain [ ] dysphagia   :                        [X] negative [ ] dysuria [ ] nocturia [ ] hematuria [ ] increased urinary frequency  Musculoskeletal: [X] negative [ ] back pain [ ] myalgias [ ] arthralgias [ ] fracture  Skin:                       [X] negative [ ] rash [ ] itch  Neurological:        [X] negative [ ] headache [ ] dizziness [ ] syncope [ ] weakness [ ] numbness  Psychiatric:           [X] negative [ ] anxiety [ ] depression    MEDICATIONS:  MEDICATIONS  (STANDING):  aMIOdarone    Tablet   Oral   aMIOdarone    Tablet 200 milliGRAM(s) Oral daily  aspirin  chewable 81 milliGRAM(s) Oral daily  atorvastatin 80 milliGRAM(s) Oral at bedtime  budesonide  80 MICROgram(s)/formoterol 4.5 MICROgram(s) Inhaler 2 Puff(s) Inhalation two times a day  carvedilol 25 milliGRAM(s) Oral every 12 hours  chlorhexidine 2% Cloths 1 Application(s) Topical daily  heparin  Infusion 1300 Unit(s)/Hr (14 mL/Hr) IV Continuous <Continuous>  hydrALAZINE 100 milliGRAM(s) Oral three times a day  insulin glargine Injectable (LANTUS) 12 Unit(s) SubCutaneous at bedtime  insulin lispro (ADMELOG) corrective regimen sliding scale   SubCutaneous at bedtime  insulin lispro (ADMELOG) corrective regimen sliding scale   SubCutaneous three times a day before meals  insulin lispro Injectable (ADMELOG) 9 Unit(s) SubCutaneous three times a day before meals  isosorbide   dinitrate Tablet (ISORDIL) 40 milliGRAM(s) Oral three times a day  polyethylene glycol 3350 17 Gram(s) Oral two times a day  senna 2 Tablet(s) Oral at bedtime  zoster Vaccine recombinant (SHINGRIX) 0.5 milliLiter(s) IntraMuscular once    MEDICATIONS  (PRN):  hydrOXYzine hydrochloride 25 milliGRAM(s) Oral two times a day PRN Anxiety    ALLERGIES:  Allergies  penicillins (Unknown)    Intolerances    OBJECTIVE:  ICU Vital Signs Last 24 Hrs  T(C): 36.6 (01 Dec 2023 03:00), Max: 36.8 (30 Nov 2023 14:00)  T(F): 97.8 (01 Dec 2023 03:00), Max: 98.3 (30 Nov 2023 14:00)  HR: 71 (01 Dec 2023 05:00) (62 - 80)  BP: --  BP(mean): --  ABP: --  ABP(mean): --  RR: 17 (01 Dec 2023 05:00) (14 - 26)  SpO2: 96% (01 Dec 2023 05:00) (95% - 98%)    O2 Parameters below as of 01 Dec 2023 05:00  Patient On (Oxygen Delivery Method): room air    CAPILLARY BLOOD GLUCOSE    POCT Blood Glucose.: 92 mg/dL (30 Nov 2023 21:22)  POCT Blood Glucose.: 129 mg/dL (30 Nov 2023 17:01)  POCT Blood Glucose.: 146 mg/dL (30 Nov 2023 12:21)  POCT Blood Glucose.: 107 mg/dL (30 Nov 2023 08:06)    I&O's Summary    29 Nov 2023 07:01  -  30 Nov 2023 07:00  --------------------------------------------------------  IN: 2012 mL / OUT: 1200 mL / NET: 812 mL    30 Nov 2023 07:01  -  01 Dec 2023 06:23  --------------------------------------------------------  IN: 1131 mL / OUT: 1840 mL / NET: -709 mL    PHYSICAL EXAMINATION:  General: WN/WD NAD  HEENT: PERRLA, EOMI, moist mucous membranes  Neurology: A&Ox3, nonfocal, MOISE x 4  Respiratory: CTA B/L, normal respiratory effort, no wheezes, crackles, rales  CV: RRR, S1S2, no murmurs, rubs or gallops  Abdominal: Soft, NT, ND +BS  Extremities: Warm and dry, no edema, + peripheral pulses  Lines: CDI    LABS:                      10.5   7.18  )-----------( 134      ( 01 Dec 2023 01:24 )             31.7     12-01    130<L>  |  101  |  44<H>  ----------------------------<  106<H>  3.6   |  17<L>  |  1.48<H>    Ca    9.2      01 Dec 2023 01:24  Phos  2.9     12-01  Mg     2.1     12-01    TPro  6.4  /  Alb  3.4  /  TBili  0.3  /  DBili  x   /  AST  22  /  ALT  32  /  AlkPhos  62  12-01    LIVER FUNCTIONS - ( 01 Dec 2023 01:24 )  Alb: 3.4 g/dL / Pro: 6.4 g/dL / ALK PHOS: 62 U/L / ALT: 32 U/L / AST: 22 U/L / GGT: x           PT/INR - ( 30 Nov 2023 00:39 )   PT: 13.3 sec;   INR: 1.22 ratio    PTT - ( 01 Dec 2023 01:24 )  PTT:49.5 sec    Urinalysis Basic - ( 01 Dec 2023 01:24 )    Color: x / Appearance: x / SG: x / pH: x  Gluc: 106 mg/dL / Ketone: x  / Bili: x / Urobili: x   Blood: x / Protein: x / Nitrite: x   Leuk Esterase: x / RBC: x / WBC x   Sq Epi: x / Non Sq Epi: x / Bacteria: x

## 2023-12-01 NOTE — PROGRESS NOTE ADULT - SUBJECTIVE AND OBJECTIVE BOX
Subjective:  - No acute events overnight. Frustrated his sleep is being interrupted overnight    Medications:  aMIOdarone    Tablet   Oral   aMIOdarone    Tablet 200 milliGRAM(s) Oral daily  aspirin  chewable 81 milliGRAM(s) Oral daily  atorvastatin 80 milliGRAM(s) Oral at bedtime  budesonide  80 MICROgram(s)/formoterol 4.5 MICROgram(s) Inhaler 2 Puff(s) Inhalation two times a day  carvedilol 25 milliGRAM(s) Oral every 12 hours  chlorhexidine 2% Cloths 1 Application(s) Topical daily  heparin  Infusion 1300 Unit(s)/Hr IV Continuous <Continuous>  hydrALAZINE 100 milliGRAM(s) Oral three times a day  hydrOXYzine hydrochloride 25 milliGRAM(s) Oral two times a day PRN  insulin glargine Injectable (LANTUS) 12 Unit(s) SubCutaneous at bedtime  insulin lispro (ADMELOG) corrective regimen sliding scale   SubCutaneous three times a day before meals  insulin lispro (ADMELOG) corrective regimen sliding scale   SubCutaneous at bedtime  insulin lispro Injectable (ADMELOG) 9 Unit(s) SubCutaneous three times a day before meals  isosorbide   dinitrate Tablet (ISORDIL) 40 milliGRAM(s) Oral three times a day  polyethylene glycol 3350 17 Gram(s) Oral two times a day  senna 2 Tablet(s) Oral at bedtime    Physical Exam:    Vitals:  Vital Signs Last 24 Hours  T(C): 36.8 (23 @ 11:00), Max: 36.8 (23 @ 14:00)  HR: 74 (23 @ 12:00) (62 - 80)  BP: --  RR: 14 (23 @ 12:00) (14 - 26)  SpO2: 96% (23 @ 12:00) (95% - 98%)    Weight in k.5 ( @ 07:00)    I&O's Summary    2023 07:01  -  01 Dec 2023 07:00  --------------------------------------------------------  IN: 1159 mL / OUT: 2465 mL / NET: -1306 mL    01 Dec 2023 07:01  -  01 Dec 2023 13:06  --------------------------------------------------------  IN: 310 mL / OUT: 550 mL / NET: -240 mL    Tele: SR 60-70s    General: No distress. Comfortable.  HEENT: EOM intact.  Neck: Neck supple. JVP not elevated. No masses  Chest: Clear to auscultation bilaterally  CV: Normal S1 and S2. No murmurs, rub, or gallops. B/L DP pulse 2+  Abdomen: Soft, non-distended, non-tender  Extremities: Warm peripherally. No edema   Skin: L fem IABP in place  Neurology: Alert and oriented times three. Sensation intact  Psych: Affect normal    Labs:                        10.5   7.18  )-----------( 134      ( 01 Dec 2023 01:24 )             31.7         130<L>  |  101  |  44<H>  ----------------------------<  106<H>  3.6   |  17<L>  |  1.48<H>    Ca    9.2      01 Dec 2023 01:24  Phos  2.9       Mg     2.1         TPro  6.4  /  Alb  3.4  /  TBili  0.3  /  DBili  x   /  AST  22  /  ALT  32  /  AlkPhos  62      PT/INR - ( 2023 00:39 )   PT: 13.3 sec;   INR: 1.22 ratio         PTT - ( 01 Dec 2023 08:20 )  PTT:65.1 sec            Lactate, Blood: 0.5 mmol/L ( @ 01:25)  Lactate, Blood: 1.9 mmol/L ( @ 05:37)

## 2023-12-02 LAB
ALBUMIN SERPL ELPH-MCNC: 3.8 G/DL — SIGNIFICANT CHANGE UP (ref 3.3–5)
ALBUMIN SERPL ELPH-MCNC: 3.8 G/DL — SIGNIFICANT CHANGE UP (ref 3.3–5)
ALP SERPL-CCNC: 64 U/L — SIGNIFICANT CHANGE UP (ref 40–120)
ALP SERPL-CCNC: 64 U/L — SIGNIFICANT CHANGE UP (ref 40–120)
ALT FLD-CCNC: 54 U/L — HIGH (ref 10–45)
ALT FLD-CCNC: 54 U/L — HIGH (ref 10–45)
ANION GAP SERPL CALC-SCNC: 11 MMOL/L — SIGNIFICANT CHANGE UP (ref 5–17)
ANION GAP SERPL CALC-SCNC: 11 MMOL/L — SIGNIFICANT CHANGE UP (ref 5–17)
APTT BLD: 72.3 SEC — HIGH (ref 24.5–35.6)
APTT BLD: 72.3 SEC — HIGH (ref 24.5–35.6)
AST SERPL-CCNC: 33 U/L — SIGNIFICANT CHANGE UP (ref 10–40)
AST SERPL-CCNC: 33 U/L — SIGNIFICANT CHANGE UP (ref 10–40)
BASOPHILS # BLD AUTO: 0.03 K/UL — SIGNIFICANT CHANGE UP (ref 0–0.2)
BASOPHILS # BLD AUTO: 0.03 K/UL — SIGNIFICANT CHANGE UP (ref 0–0.2)
BASOPHILS NFR BLD AUTO: 0.4 % — SIGNIFICANT CHANGE UP (ref 0–2)
BASOPHILS NFR BLD AUTO: 0.4 % — SIGNIFICANT CHANGE UP (ref 0–2)
BILIRUB SERPL-MCNC: 0.3 MG/DL — SIGNIFICANT CHANGE UP (ref 0.2–1.2)
BILIRUB SERPL-MCNC: 0.3 MG/DL — SIGNIFICANT CHANGE UP (ref 0.2–1.2)
BUN SERPL-MCNC: 36 MG/DL — HIGH (ref 7–23)
BUN SERPL-MCNC: 36 MG/DL — HIGH (ref 7–23)
CALCIUM SERPL-MCNC: 9.7 MG/DL — SIGNIFICANT CHANGE UP (ref 8.4–10.5)
CALCIUM SERPL-MCNC: 9.7 MG/DL — SIGNIFICANT CHANGE UP (ref 8.4–10.5)
CHLORIDE SERPL-SCNC: 101 MMOL/L — SIGNIFICANT CHANGE UP (ref 96–108)
CHLORIDE SERPL-SCNC: 101 MMOL/L — SIGNIFICANT CHANGE UP (ref 96–108)
CO2 SERPL-SCNC: 19 MMOL/L — LOW (ref 22–31)
CO2 SERPL-SCNC: 19 MMOL/L — LOW (ref 22–31)
CREAT SERPL-MCNC: 1.37 MG/DL — HIGH (ref 0.5–1.3)
CREAT SERPL-MCNC: 1.37 MG/DL — HIGH (ref 0.5–1.3)
EGFR: 59 ML/MIN/1.73M2 — LOW
EGFR: 59 ML/MIN/1.73M2 — LOW
EOSINOPHIL # BLD AUTO: 0.29 K/UL — SIGNIFICANT CHANGE UP (ref 0–0.5)
EOSINOPHIL # BLD AUTO: 0.29 K/UL — SIGNIFICANT CHANGE UP (ref 0–0.5)
EOSINOPHIL NFR BLD AUTO: 3.9 % — SIGNIFICANT CHANGE UP (ref 0–6)
EOSINOPHIL NFR BLD AUTO: 3.9 % — SIGNIFICANT CHANGE UP (ref 0–6)
GLUCOSE BLDC GLUCOMTR-MCNC: 123 MG/DL — HIGH (ref 70–99)
GLUCOSE BLDC GLUCOMTR-MCNC: 123 MG/DL — HIGH (ref 70–99)
GLUCOSE BLDC GLUCOMTR-MCNC: 125 MG/DL — HIGH (ref 70–99)
GLUCOSE BLDC GLUCOMTR-MCNC: 125 MG/DL — HIGH (ref 70–99)
GLUCOSE BLDC GLUCOMTR-MCNC: 141 MG/DL — HIGH (ref 70–99)
GLUCOSE BLDC GLUCOMTR-MCNC: 141 MG/DL — HIGH (ref 70–99)
GLUCOSE BLDC GLUCOMTR-MCNC: 250 MG/DL — HIGH (ref 70–99)
GLUCOSE BLDC GLUCOMTR-MCNC: 250 MG/DL — HIGH (ref 70–99)
GLUCOSE SERPL-MCNC: 155 MG/DL — HIGH (ref 70–99)
GLUCOSE SERPL-MCNC: 155 MG/DL — HIGH (ref 70–99)
HCT VFR BLD CALC: 33.2 % — LOW (ref 39–50)
HCT VFR BLD CALC: 33.2 % — LOW (ref 39–50)
HGB BLD-MCNC: 11 G/DL — LOW (ref 13–17)
HGB BLD-MCNC: 11 G/DL — LOW (ref 13–17)
IMM GRANULOCYTES NFR BLD AUTO: 0.3 % — SIGNIFICANT CHANGE UP (ref 0–0.9)
IMM GRANULOCYTES NFR BLD AUTO: 0.3 % — SIGNIFICANT CHANGE UP (ref 0–0.9)
INR BLD: 1.15 RATIO — SIGNIFICANT CHANGE UP (ref 0.85–1.18)
INR BLD: 1.15 RATIO — SIGNIFICANT CHANGE UP (ref 0.85–1.18)
LYMPHOCYTES # BLD AUTO: 1.02 K/UL — SIGNIFICANT CHANGE UP (ref 1–3.3)
LYMPHOCYTES # BLD AUTO: 1.02 K/UL — SIGNIFICANT CHANGE UP (ref 1–3.3)
LYMPHOCYTES # BLD AUTO: 13.8 % — SIGNIFICANT CHANGE UP (ref 13–44)
LYMPHOCYTES # BLD AUTO: 13.8 % — SIGNIFICANT CHANGE UP (ref 13–44)
MAGNESIUM SERPL-MCNC: 2 MG/DL — SIGNIFICANT CHANGE UP (ref 1.6–2.6)
MAGNESIUM SERPL-MCNC: 2 MG/DL — SIGNIFICANT CHANGE UP (ref 1.6–2.6)
MCHC RBC-ENTMCNC: 29.3 PG — SIGNIFICANT CHANGE UP (ref 27–34)
MCHC RBC-ENTMCNC: 29.3 PG — SIGNIFICANT CHANGE UP (ref 27–34)
MCHC RBC-ENTMCNC: 33.1 GM/DL — SIGNIFICANT CHANGE UP (ref 32–36)
MCHC RBC-ENTMCNC: 33.1 GM/DL — SIGNIFICANT CHANGE UP (ref 32–36)
MCV RBC AUTO: 88.5 FL — SIGNIFICANT CHANGE UP (ref 80–100)
MCV RBC AUTO: 88.5 FL — SIGNIFICANT CHANGE UP (ref 80–100)
MONOCYTES # BLD AUTO: 0.54 K/UL — SIGNIFICANT CHANGE UP (ref 0–0.9)
MONOCYTES # BLD AUTO: 0.54 K/UL — SIGNIFICANT CHANGE UP (ref 0–0.9)
MONOCYTES NFR BLD AUTO: 7.3 % — SIGNIFICANT CHANGE UP (ref 2–14)
MONOCYTES NFR BLD AUTO: 7.3 % — SIGNIFICANT CHANGE UP (ref 2–14)
NEUTROPHILS # BLD AUTO: 5.51 K/UL — SIGNIFICANT CHANGE UP (ref 1.8–7.4)
NEUTROPHILS # BLD AUTO: 5.51 K/UL — SIGNIFICANT CHANGE UP (ref 1.8–7.4)
NEUTROPHILS NFR BLD AUTO: 74.3 % — SIGNIFICANT CHANGE UP (ref 43–77)
NEUTROPHILS NFR BLD AUTO: 74.3 % — SIGNIFICANT CHANGE UP (ref 43–77)
NRBC # BLD: 0 /100 WBCS — SIGNIFICANT CHANGE UP (ref 0–0)
NRBC # BLD: 0 /100 WBCS — SIGNIFICANT CHANGE UP (ref 0–0)
PHOSPHATE SERPL-MCNC: 3 MG/DL — SIGNIFICANT CHANGE UP (ref 2.5–4.5)
PHOSPHATE SERPL-MCNC: 3 MG/DL — SIGNIFICANT CHANGE UP (ref 2.5–4.5)
PLATELET # BLD AUTO: 138 K/UL — LOW (ref 150–400)
PLATELET # BLD AUTO: 138 K/UL — LOW (ref 150–400)
POTASSIUM SERPL-MCNC: 4.5 MMOL/L — SIGNIFICANT CHANGE UP (ref 3.5–5.3)
POTASSIUM SERPL-MCNC: 4.5 MMOL/L — SIGNIFICANT CHANGE UP (ref 3.5–5.3)
POTASSIUM SERPL-SCNC: 4.5 MMOL/L — SIGNIFICANT CHANGE UP (ref 3.5–5.3)
POTASSIUM SERPL-SCNC: 4.5 MMOL/L — SIGNIFICANT CHANGE UP (ref 3.5–5.3)
PROT SERPL-MCNC: 6.9 G/DL — SIGNIFICANT CHANGE UP (ref 6–8.3)
PROT SERPL-MCNC: 6.9 G/DL — SIGNIFICANT CHANGE UP (ref 6–8.3)
PROTHROM AB SERPL-ACNC: 12.6 SEC — SIGNIFICANT CHANGE UP (ref 9.5–13)
PROTHROM AB SERPL-ACNC: 12.6 SEC — SIGNIFICANT CHANGE UP (ref 9.5–13)
RBC # BLD: 3.75 M/UL — LOW (ref 4.2–5.8)
RBC # BLD: 3.75 M/UL — LOW (ref 4.2–5.8)
RBC # FLD: 14.8 % — HIGH (ref 10.3–14.5)
RBC # FLD: 14.8 % — HIGH (ref 10.3–14.5)
SODIUM SERPL-SCNC: 131 MMOL/L — LOW (ref 135–145)
SODIUM SERPL-SCNC: 131 MMOL/L — LOW (ref 135–145)
UFH PPP CHRO-ACNC: 0.53 IU/ML — SIGNIFICANT CHANGE UP (ref 0.3–0.7)
UFH PPP CHRO-ACNC: 0.53 IU/ML — SIGNIFICANT CHANGE UP (ref 0.3–0.7)
WBC # BLD: 7.41 K/UL — SIGNIFICANT CHANGE UP (ref 3.8–10.5)
WBC # BLD: 7.41 K/UL — SIGNIFICANT CHANGE UP (ref 3.8–10.5)
WBC # FLD AUTO: 7.41 K/UL — SIGNIFICANT CHANGE UP (ref 3.8–10.5)
WBC # FLD AUTO: 7.41 K/UL — SIGNIFICANT CHANGE UP (ref 3.8–10.5)

## 2023-12-02 PROCEDURE — 99232 SBSQ HOSP IP/OBS MODERATE 35: CPT | Mod: GC

## 2023-12-02 PROCEDURE — 99292 CRITICAL CARE ADDL 30 MIN: CPT

## 2023-12-02 PROCEDURE — 93010 ELECTROCARDIOGRAM REPORT: CPT

## 2023-12-02 PROCEDURE — 71045 X-RAY EXAM CHEST 1 VIEW: CPT | Mod: 26

## 2023-12-02 PROCEDURE — 99291 CRITICAL CARE FIRST HOUR: CPT

## 2023-12-02 RX ADMIN — Medication 2: at 12:04

## 2023-12-02 RX ADMIN — ATORVASTATIN CALCIUM 80 MILLIGRAM(S): 80 TABLET, FILM COATED ORAL at 22:34

## 2023-12-02 RX ADMIN — Medication 9 UNIT(S): at 12:04

## 2023-12-02 RX ADMIN — BUDESONIDE AND FORMOTEROL FUMARATE DIHYDRATE 2 PUFF(S): 160; 4.5 AEROSOL RESPIRATORY (INHALATION) at 09:01

## 2023-12-02 RX ADMIN — CARVEDILOL PHOSPHATE 25 MILLIGRAM(S): 80 CAPSULE, EXTENDED RELEASE ORAL at 17:28

## 2023-12-02 RX ADMIN — Medication 100 MILLIGRAM(S): at 00:24

## 2023-12-02 RX ADMIN — Medication 9 UNIT(S): at 16:50

## 2023-12-02 RX ADMIN — CARVEDILOL PHOSPHATE 25 MILLIGRAM(S): 80 CAPSULE, EXTENDED RELEASE ORAL at 06:25

## 2023-12-02 RX ADMIN — Medication 81 MILLIGRAM(S): at 12:07

## 2023-12-02 RX ADMIN — Medication 100 MILLIGRAM(S): at 13:43

## 2023-12-02 RX ADMIN — Medication 100 MILLIGRAM(S): at 22:34

## 2023-12-02 RX ADMIN — AMIODARONE HYDROCHLORIDE 200 MILLIGRAM(S): 400 TABLET ORAL at 06:25

## 2023-12-02 RX ADMIN — HEPARIN SODIUM 14 UNIT(S)/HR: 5000 INJECTION INTRAVENOUS; SUBCUTANEOUS at 22:35

## 2023-12-02 RX ADMIN — Medication 100 MILLIGRAM(S): at 08:29

## 2023-12-02 RX ADMIN — INSULIN GLARGINE 12 UNIT(S): 100 INJECTION, SOLUTION SUBCUTANEOUS at 22:35

## 2023-12-02 RX ADMIN — ISOSORBIDE DINITRATE 40 MILLIGRAM(S): 5 TABLET ORAL at 12:06

## 2023-12-02 RX ADMIN — SENNA PLUS 2 TABLET(S): 8.6 TABLET ORAL at 22:35

## 2023-12-02 RX ADMIN — ISOSORBIDE DINITRATE 40 MILLIGRAM(S): 5 TABLET ORAL at 16:49

## 2023-12-02 RX ADMIN — Medication 9 UNIT(S): at 08:23

## 2023-12-02 RX ADMIN — ISOSORBIDE DINITRATE 40 MILLIGRAM(S): 5 TABLET ORAL at 06:25

## 2023-12-02 NOTE — PROGRESS NOTE ADULT - SUBJECTIVE AND OBJECTIVE BOX
[facility] DIVISION OF KIDNEY DISEASES AND HYPERTENSION -- FOLLOW UP NOTE  --------------------------------------------------------------------------------  Chief Complaint:    24 hour events/subjective:        PAST HISTORY  --------------------------------------------------------------------------------  No significant changes to PMH, PSH, FHx, SHx, unless otherwise noted    ALLERGIES & MEDICATIONS  --------------------------------------------------------------------------------  Allergies    penicillins (Unknown)    Intolerances      Standing Inpatient Medications  aMIOdarone    Tablet   Oral   aMIOdarone    Tablet 200 milliGRAM(s) Oral daily  aspirin  chewable 81 milliGRAM(s) Oral daily  atorvastatin 80 milliGRAM(s) Oral at bedtime  budesonide  80 MICROgram(s)/formoterol 4.5 MICROgram(s) Inhaler 2 Puff(s) Inhalation two times a day  carvedilol 25 milliGRAM(s) Oral every 12 hours  chlorhexidine 2% Cloths 1 Application(s) Topical daily  heparin  Infusion 1300 Unit(s)/Hr IV Continuous <Continuous>  hydrALAZINE 100 milliGRAM(s) Oral three times a day  insulin glargine Injectable (LANTUS) 12 Unit(s) SubCutaneous at bedtime  insulin lispro (ADMELOG) corrective regimen sliding scale   SubCutaneous three times a day before meals  insulin lispro (ADMELOG) corrective regimen sliding scale   SubCutaneous at bedtime  insulin lispro Injectable (ADMELOG) 9 Unit(s) SubCutaneous three times a day before meals  isosorbide   dinitrate Tablet (ISORDIL) 40 milliGRAM(s) Oral three times a day  polyethylene glycol 3350 17 Gram(s) Oral two times a day  senna 2 Tablet(s) Oral at bedtime    PRN Inpatient Medications  hydrOXYzine hydrochloride 25 milliGRAM(s) Oral two times a day PRN      REVIEW OF SYSTEMS  --------------------------------------------------------------------------------  Gen: No weight changes, fatigue, fevers/chills, weakness  Skin: No rashes  Head/Eyes/Ears/Mouth: No headache; Normal hearing; Normal vision w/o blurriness; No sinus pain/discomfort, sore throat  Respiratory: No dyspnea, cough, wheezing, hemoptysis  CV: No chest pain, PND, orthopnea  GI: No abdominal pain, diarrhea, constipation, nausea, vomiting, melena, hematochezia  : No increased frequency, dysuria, hematuria, nocturia  MSK: No joint pain/swelling; no back pain; no edema  Neuro: No dizziness/lightheadedness, weakness, seizures, numbness, tingling  Heme: No easy bruising or bleeding  Endo: No heat/cold intolerance  Psych: No significant nervousness, anxiety, stress, depression    All other systems were reviewed and are negative, except as noted.    VITALS/PHYSICAL EXAM  --------------------------------------------------------------------------------  T(C): 36.7 (12-02-23 @ 07:00), Max: 36.9 (12-01-23 @ 15:00)  HR: 75 (12-02-23 @ 07:00) (62 - 86)  BP: 90/55 (12-01-23 @ 20:00) (90/55 - 90/55)  RR: 16 (12-02-23 @ 07:00) (14 - 25)  SpO2: 97% (12-02-23 @ 07:00) (95% - 99%)  Wt(kg): --        12-01-23 @ 07:01  -  12-02-23 @ 07:00  --------------------------------------------------------  IN: 756 mL / OUT: 1450 mL / NET: -694 mL        LABS/STUDIES  --------------------------------------------------------------------------------              11.0   7.41  >-----------<  138      [12-02-23 @ 00:22]              33.2     131  |  101  |  36  ----------------------------<  155      [12-02-23 @ 00:22]  4.5   |  19  |  1.37        Ca     9.7     [12-02-23 @ 00:22]      Mg     2.0     [12-02-23 @ 00:22]      Phos  3.0     [12-02-23 @ 00:22]    TPro  6.9  /  Alb  3.8  /  TBili  0.3  /  DBili  x   /  AST  33  /  ALT  54  /  AlkPhos  64  [12-02-23 @ 00:22]    PT/INR: PT 12.6 , INR 1.15       [12-02-23 @ 00:22]  PTT: 72.3       [12-02-23 @ 00:22]      Creatinine Trend:  SCr 1.37 [12-02 @ 00:22]  SCr 1.48 [12-01 @ 01:24]  SCr 1.56 [11-30 @ 00:39]  SCr 1.64 [11-29 @ 12:42]  SCr 1.96 [11-29 @ 05:37]    Urinalysis - [12-02-23 @ 00:22]      Color  / Appearance  / SG  / pH       Gluc 155 / Ketone   / Bili  / Urobili        Blood  / Protein  / Leuk Est  / Nitrite       RBC  / WBC  / Hyaline  / Gran  / Sq Epi  / Non Sq Epi  / Bacteria     Urine Creatinine 100      [11-29-23 @ 15:43]  Urine Sodium 21      [11-29-23 @ 15:43]  Urine Potassium 28      [11-29-23 @ 15:43]  Urine Osmolality 715      [11-29-23 @ 15:43]    Iron 46, TIBC 234, %sat 20      [11-10-23 @ 17:25]  Ferritin 1646      [11-10-23 @ 17:29]  TSH 0.38      [11-10-23 @ 17:29]  Lipid: chol 130, TG 95, HDL 37, LDL --      [11-10-23 @ 17:25]    HBsAb Nonreact      [11-10-23 @ 17:29]  HBcAb Nonreact      [11-10-23 @ 17:29]  HCV 0.09, Nonreact      [11-10-23 @ 17:29]    BETZAIDA: titer Negative, pattern --      [11-10-23 @ 17:29]  C3 Complement 171      [11-19-23 @ 02:54]  C4 Complement 45      [11-19-23 @ 02:54]  Rheumatoid Factor 13      [11-10-23 @ 17:25]  ANCA: cANCA Negative, pANCA Negative, atypical ANCA Negative      [11-21-23 @ 03:50]  anti-GBM <0.2      [11-19-23 @ 02:54]  Syphilis Screen (Treponema Pallidum Ab) Negative      [11-10-23 @ 17:29]  PLA2R: JACEK <1.8, IFA --      [11-19-23 @ 02:54]  Free Light Chains: kappa 3.76, lambda 3.25, ratio = 1.16      [11-19 @ 02:54]  Immunofixation Serum:   No Monoclonal Band Identified      Reference Range: None Detected      [11-19-23 @ 02:54]  SPEP Interpretation: Normal Electrophoresis Pattern      [11-10-23 @ 17:29]  Immunofixation Urine: No Monoclonal Band Identified      Reference Range: None Detected      [11-12-23 @ 05:03]  UPEP Interpretation: Normal Electrophoresis Pattern      [11-12-23 @ 05:03]

## 2023-12-02 NOTE — PROGRESS NOTE ADULT - PROBLEM SELECTOR PLAN 2
Hyponatremia: developed during this hospital course. He is not symptomatic. Urine Osm 471 and Urine Na 88.   Given 150cc bolus of 3% saline on 11/24. Na today 130 and pt remained asymptomatic. His nausea has improved and he was on liquid diet. Today he was transitioned to regular diet. Urena was Dced on 11/30/23      Pt  hyponatremia is improving. Today 131 and pt is eating solid diet.   Fluid restriction till the hyponatremia resolves. Target < 1-1.5L a day.   rest of the management as per the primary team.

## 2023-12-02 NOTE — PROGRESS NOTE ADULT - ASSESSMENT
58 male with HTN, CAD (s/p PCI 2008), HFrEF, CVA 2018, and T2DM presenting with chest pressure and unknown tachycardia that was shocked x1, C 11/1 found to have in-stent restenosis of pLAD and  of RCA with elevated RA and PA pressures and severely decreased. Admitted to CICU for management of cardiogenic shock and ADHF requiring IABP 11/1 -11/7, with hospital course c/b vfib arrest requiring reinsertion of IABP. Currently listed for transplant status 2.    Plan:  ====================== NEUROLOGY=====================  Anxiety  - No Seroquel/antipsychotics since vfib arrest and prolonged QTC  - Psych Eval, recommended SSRI, but pt. refused   - Psych recommending atarax PRN  - Continue to monitor mental status    PT/Conditioning  - Continue band exercised while on bedrest s/t IABP    ==================== RESPIRATORY======================  Acute Hypoxemic Respiratory Failure  - s/p x2 intubations for cardiogenic pulm edema and the in setting of cardiac arrest, resolved - extubated 11/10  - Currently on room air with spO2 mid 90s  - Continue incentive spirometry and monitoring of sp02    Asthma  - c/w albuterol, symbicort and spiriva  - On trelegy at home  - Continue to monitor SpO2 with goal >94%    ====================CARDIOVASCULAR==================  Vfib arrest i/s/o ischemia  - Lido gtt off 11/13  - PO Amio load - total of 5g per EP complete 11/17, continue PO amio  - Keep K > 4, Mag > 2.2     Cardiogenic shock requiring IABP (11/1- 11/7, 11/9-)  - Likely 2/2 NSTEMI and ADHF  - 11/1 LHC: pLAD 100 % in-stent restenosis & mRCA, 100 %. PCWP 30. IABP placed.  - 11/1 TTE: LV dilated. EF 32 %. Regional WMAs present, mod (grade 2) LV diastolic dysfunction  - 11/2 TTE: EF 22% and + LV thrombus  - 11/29 s/p 500 cc bolus  - IABP swapped 11/20 to RFA, continue 1:1 support  - Off Milrinone gtt @ 7:30 am 11/11  - Currently on hydralazine 100 TID and ISD 40 TID for AL reduction  - [place] d/c'd due to elevated K levels  - c/w coreg 25 BID for GDMT  - Listed OHT status 2 11/22, f/u HF recs    NSTEMI iso stent re-occlusion of pLAD and 100%  of RCA  - EKG on admission w/ LBBB  - DAPT: c/w ASA, Brilinta d/c'd per transplant w/u  - c/w lipitor 80  - cMR deferred given necessity of IABP  - CT sx not recommending CABG, undergoing AT eval    LV thrombus  - c/w heparin gtt    ===================== RENAL =========================  Non-oliguric ARIC   - Baseline Cr: 1-1.22  - s/p 500 LR 11/29  - Renal US: no evidence of renal artery stenosis  - Trend BMP, lytes daily, replace as needed  - Continue Strict I/Os, avoid nephrotoxins    Mild Hyponatremia/Hyperkalemia iso ARIC and or new CKD baseline  - Continue fluid restriction   - Urea powder d/c'd per renal  - Trend daily  - Continue monitoring urine output, lytes, SCr/ BUN  - Replete lytes prn with goal K >4 and Mg >2    =============== GASTROINTESTINAL===================  Constipation/ileus, resolved  - s/p multiple BMs, symptoms improving   - c/w diet    ===================ENDO====================  Type 2 DM  - A1c 8.3  - Continue lantus, premeal, low ISS  - continue FS    ===================HEMATOLOGIC/ONC ===================  - H/H & plts stable  - Monitor H/H and plts  - VTE PPX: heparin gtt    ==================INFECTIOUS DISEASE================  # Leukocytosis  - Repeat BCx pending  - Monitor off abx at this time  - Monitor and trend WBC and temperature curve     # Enterococcus faecalis bacteremia, resolved  - BCx 11/17+ for enterococcus faecalis x2, Staph epi x1 (likely contaminant)- pan sensitive   - Urine cx 11/18 + enterococcus faecalis  - BCx 11/18 NGTD  - IABP site swapped to RFA 11/20  - s/p Vancomycin 1g q12h (11/18-11/27)  - CT A/P negative for infectious pathology    # COVID, resolved  - Off airborne precautions 11/11    # Pre-transplant ID w/u   - Trend ID recs for serologies   - Colonoscopy 11/17 - normal   - Chest CT 11/17 - improved LLL aeration  - s/p immunizations

## 2023-12-02 NOTE — PROGRESS NOTE ADULT - SUBJECTIVE AND OBJECTIVE BOX
LD VALENCIA  58y Male  MRN:[id number]    Patient is a 58y old  Male who presents with a chief complaint of NSTEMI (01 Nov 2023 20:29)    HPI:  57yo M w/ hx HTN, CAD w/ 1 stent in 2009, ICH (2008) presenting with abn ekg. Patient presented to [facility] where he was found to have STEMI, recommended to get cath however patient did not want to get it there so it left and came here.  Patient initially had cough, congestion, fever, was placed on antibiotics on Sunday.  Started feeling nauseous and had a presyncopal event after which he presented to ED last night.  Had chest pain as well.  Chest pain is midsternal.  Not currently having chest pain.  Received 4 aspirin 30 min pta. (01 Nov 2023 15:11)      Patient seen and evaluated at bedside in CCU. interval events noted    Interval HPI: remain in ccu. IABP in place        PAST MEDICAL & SURGICAL HISTORY:  HTN (hypertension)      CAD (coronary artery disease)  2009; stent      Intracranial hemorrhage  2008      Respiratory arrest  december 1st      Myocardial infarction, unspecified MI type, unspecified artery      History of coronary artery stent placement          REVIEW OF SYSTEMS:  as per hpi     VITALS:   ICU Vital Signs Last 24 Hrs  T(C): 36.8 (02 Dec 2023 15:00), Max: 36.8 (01 Dec 2023 23:00)  T(F): 98.2 (02 Dec 2023 15:00), Max: 98.3 (01 Dec 2023 23:00)  HR: 80 (02 Dec 2023 18:00) (62 - 86)  BP: --  BP(mean): --  ABP: --  ABP(mean): --  RR: 18 (02 Dec 2023 18:00) (14 - 25)  SpO2: 99% (02 Dec 2023 17:00) (95% - 99%)    O2 Parameters below as of 02 Dec 2023 17:00  Patient On (Oxygen Delivery Method): room air              PHYSICAL EXAM:  GENERAL: NAD, comfortable   HEAD:  Atraumatic, Normocephalic  EYES: EOMI, PERRLA, conjunctiva and sclera clear  NECK: Supple, No JVD   CHEST/LUNG: Clear to auscultation bilaterally; No wheeze  HEART: Regular rate and rhythm; No murmurs, rubs, or gallops  ABDOMEN: Soft, Nontender, Nondistended; Bowel sounds present  EXTREMITIES:  2+ Peripheral Pulses, No clubbing, cyanosis, or edema  NEUROLOGY: nonfocal  SKIN: No rashes or lesions  +iabp    Consultant(s) Notes Reviewed:  [x ] YES  [ ] NO  Care Discussed with Consultants/Other Providers [ x] YES  [ ] NO    MEDS:   MEDICATIONS  (STANDING):  aMIOdarone    Tablet   Oral   aMIOdarone    Tablet 200 milliGRAM(s) Oral daily  aspirin  chewable 81 milliGRAM(s) Oral daily  atorvastatin 80 milliGRAM(s) Oral at bedtime  budesonide  80 MICROgram(s)/formoterol 4.5 MICROgram(s) Inhaler 2 Puff(s) Inhalation two times a day  carvedilol 25 milliGRAM(s) Oral every 12 hours  chlorhexidine 2% Cloths 1 Application(s) Topical daily  heparin  Infusion 1300 Unit(s)/Hr (14 mL/Hr) IV Continuous <Continuous>  hydrALAZINE 100 milliGRAM(s) Oral three times a day  insulin glargine Injectable (LANTUS) 12 Unit(s) SubCutaneous at bedtime  insulin lispro (ADMELOG) corrective regimen sliding scale   SubCutaneous at bedtime  insulin lispro (ADMELOG) corrective regimen sliding scale   SubCutaneous three times a day before meals  insulin lispro Injectable (ADMELOG) 9 Unit(s) SubCutaneous three times a day before meals  isosorbide   dinitrate Tablet (ISORDIL) 40 milliGRAM(s) Oral three times a day  polyethylene glycol 3350 17 Gram(s) Oral two times a day  senna 2 Tablet(s) Oral at bedtime    MEDICATIONS  (PRN):  hydrOXYzine hydrochloride 25 milliGRAM(s) Oral two times a day PRN Anxiety      ALLERGIES:  penicillins (Unknown)      LABS:                                            11.0   7.41  )-----------( 138      ( 02 Dec 2023 00:22 )             33.2   12-02    131<L>  |  101  |  36<H>  ----------------------------<  155<H>  4.5   |  19<L>  |  1.37<H>    Ca    9.7      02 Dec 2023 00:22  Phos  3.0     12-02  Mg     2.0     12-02    TPro  6.9  /  Alb  3.8  /  TBili  0.3  /  DBili  x   /  AST  33  /  ALT  54<H>  /  AlkPhos  64  12-02        < from: CT Abdomen and Pelvis No Cont (11.28.23 @ 03:38) >  IMPRESSION:  Mildly dilated colon and prominent but not overly dilated small bowel   with air-fluid levels. No discrete transition point. Findings are   suggestive of ileus.    Intra-aortic balloon pump with the inferior marker at the level of the   inferior mesenteric artery and the balloon overlying the origins of the   renal arteries, celiac artery, and superior mesenteric artery. Consider   repositioning. Concern for IABP positioning was discussed with LANETTE Hawthorne   on 11/28/2023 at 3:42 AM by Dr. Shepard.    < end of copied text >          < from: Colonoscopy (11.17.23 @ 10:35) >                                                                                                        Impression:          - The entire examined colon is normal on direct and retroflexion views.                       - No specimens collected.  Recommendation:      - Resume previous diet today.                       - No large polyps or masses detected, No objection from GI standpoint to                 proceed with heart transplantation/advanced heart therapies.                       - Please call back should any further questions or concerns arise.    < end of copied text >     < from: Xray Chest 1 View- PORTABLE-Urgent (11.01.23 @ 07:42) >    IMPRESSION:  Clear lungs.    ---End of Report ---        < end of copied text >  < from: TTE W or WO Ultrasound Enhancing Agent (11.01.23 @ 10:23) >  _____________________________     CONCLUSIONS:      1. Left ventricular cavity is moderately dilated. Left ventricular wall thickness is normal. Left ventricular systolic function is severely decreased with an ejection fraction of 32 % by Chinchilla's method of disks. Regional wall motion abnormalities present.   2. Multiple segmental abnormalities exist. See findings.   3. There is moderate (grade 2) left ventricular diastolic dysfunction, with indeterminant filling pressure.   4. Normal right ventricular cavity size, wall thickness, and systolic function.   5. No significant valvular disease.   6. No pericardial effusion seen.   7. Compared to the transthoracic echocardiogram performed on 1/25/2017 the areas of akinesis are unchanged but there has been a decline in LV systolic function with new areas of hypokinesis.    __________________________________________________________________    < end of copied text >  < from: TTE Limited W or WO Ultrasound Enhancing Agent (11.02.23 @ 07:41) >  __________________________     CONCLUSIONS:      1. After obtaining consent, Definity ultrasound enhancing agent was given for enhanced left ventricular opacification and improved delineation of the left ventricular endocardial borders. Left ventricular systolic function is severely decreased with a calculated ejection fraction of 22 % by the Chinchilla's biplane method of disks. There is a left ventricular thrombus.   2. Findings were discussed with Litzy BOSS on 11/2/2023 at 8.49am.   3. There is a left ventricular thrombus.    _________________________________________________________________    < end of copied text >

## 2023-12-02 NOTE — PROGRESS NOTE ADULT - PROBLEM SELECTOR PLAN 1
ARIC: in the setting of heart failure, COVID infection. At the time of presentation Scr is 1.25. Started to worsen on 11/4 and peaked at 4.07 11/10 and gradually improved to 1.8 on 11/18. Pt had LHC on 11/1. He has hx of DM with proteinuria of 684 but most recent UA negative. Primary team is planning to get cardiac transplant eval. Remains nonoliguric, UOP 1.7L/24h. HIV, Hep B, Hep C negative. A1c - 8.3. Complements are not low. BETZAIDA and ANCA negative. Light chains - not significant. UPCR <0.1, PLA2R ab - negative, Anti GBM abs - negative. UPEP negative.   Duplex - normal, symmetric blood flow throughout both kidneys. Velocities and RIs are limited by IABP.   Serum creatinine has worsened slightly  to 1.9  then today it improved to 1.52---> 1.36 today.       No indication for dialysis.

## 2023-12-02 NOTE — PROGRESS NOTE ADULT - ATTENDING COMMENTS
Cardiogenic shock requiring mechanical support.  Afterload reduction.  Listed for transplant.    Appreciate Heart Failure follow-up.

## 2023-12-02 NOTE — PROGRESS NOTE ADULT - ASSESSMENT
57 yo male h/o htn, cad s/p pci, ICH, here with NSTEMI  s/p intubation and cath. now in CCU    NSTEMI  s/p  cath  cath results noted. multi vessel dz., CTSx f/u.   hep gtt  pt with vtach/fib arrest again on 11/8. s/p ACLS and ROSC.   now extubated  IABP in place  mngt as per CCU  plan for transplant as per HF and transplant team  transplant w/u ongoing.   s/p colonoscopy 11/17. normal  now listed for transplant as of 11/22    LV thrombus   hep gtt    acute on chronic systolic heart failure  heart failure team f/u    pna  resolved     covid  resolved  now off isolation     h/o ICH  resolved    agitation  psy consult f/u    bacteremia  id following  iv abx  f/u repeat cult - neg    vomiting and abd pain  ileus on CT  bowel regimen  gi f/u  +bm    now resolved.     IABP malposition on CT  mngt as per ccu. iabp adjusted    mngt as per CCU team          Advanced care planning was discussed with patient and family.  Advanced care planning forms were reviewed and discussed as appropriate.  Differential diagnosis and plan of care discussed with patient after the evaluation.   Pain assessed and judicious use of narcotics when appropriate was discussed.  Importance of Fall prevention discussed.  Counseling on Smoking and Alcohol cessation was offered when appropriate.  Counseling on Diet, exercise, and medication compliance was done.       Approx 75 minutes spent.

## 2023-12-02 NOTE — PROGRESS NOTE ADULT - SUBJECTIVE AND OBJECTIVE BOX
CICU DAY NOTE  Admission date: 11/1/23  Chief complaint/ Diagnosis: Cardiogenic shock   HPI: 57yo M w/ hx HTN, CAD w/ 1 stent in 2009, ICH (2008) presenting with abn ekg. Patient presented to [facility] where he was found to have STEMI, recommended to get cath however patient did not want to get it there so it left and came here.  Patient initially had cough, congestion, fever, was placed on antibiotics on Sunday.  Started feeling nauseous and had a presyncopal event after which he presented to ED last night.  Had chest pain as well.  Chest pain is midsternal.  Not currently having chest pain.  Received 4 aspirin 30 min pta. (01 Nov 2023 15:11)    Interval history: Unevenful overnight    REVIEW OF SYSTEMS  Denies CP, Palpitation, SOB, Dyspnea [ x ] All other systems negative    MEDICATIONS  (STANDING)  aMIOdarone    Tablet 200 milliGRAM(s) Oral daily  aspirin  chewable 81 milliGRAM(s) Oral daily  atorvastatin 80 milliGRAM(s) Oral at bedtime  budesonide  80 MICROgram(s)/formoterol 4.5 MICROgram(s) Inhaler 2 Puff(s) Inhalation two times a day  carvedilol 25 milliGRAM(s) Oral every 12 hours  chlorhexidine 2% Cloths 1 Application(s) Topical daily  heparin  Infusion 1300 Unit(s)/Hr (14 mL/Hr) IV Continuous <Continuous>  hydrALAZINE 100 milliGRAM(s) Oral three times a day  insulin glargine Injectable (LANTUS) 12 Unit(s) SubCutaneous at bedtime  insulin lispro (ADMELOG) corrective regimen sliding scale   SubCutaneous three times a day before meals  insulin lispro (ADMELOG) corrective regimen sliding scale   SubCutaneous at bedtime  insulin lispro Injectable (ADMELOG) 9 Unit(s) SubCutaneous three times a day before meals  isosorbide   dinitrate Tablet (ISORDIL) 40 milliGRAM(s) Oral three times a day  polyethylene glycol 3350 17 Gram(s) Oral two times a day  senna 2 Tablet(s) Oral at bedtime    MEDICATIONS  (PRN):  hydrOXYzine hydrochloride 25 milliGRAM(s) Oral two times a day PRN Anxiety    PAST MEDICAL & SURGICAL HISTORY:  HTN (hypertension)  CAD (coronary artery disease) 2009; stent  Intracanial [id number]  Respiratory arrest december 1st  Myocardial infarction, unspecified MI type, unspecified artery  History of coronary artery stent placement    FAMILY HISTORY  Family history of meningitis (Sibling)  Brother, death at age 49 from complications of infection (June 2015)  Family history of hypertension in mothe    Allergy   penicillins (Unknown)    ICU Vital Signs Last 24 Hrs  T(C): 36.8 (Max: 36.9)  HR: 86 (62 - 86)  BP: 90/55  (90/55 - 90/55)  Aug  RR: 22 (14 - 25)  SpO2: 97% (95% - 98%)on room air     I&O's Summary  IN: 668 mL / OUT: 1450 mL / NET: -782 mL    PHYSICAL EXAM  Appearance: Normal, NAD  HEAD:   Normocephalic  EYES: PERRLA, conjunctiva and sclera clear  NECK: Supple, No JVD  CHEST/LUNG: Clear to auscultation bilaterally; No wheezing  HEART: Normal S1, S2. No murmurs, rubs, or gallops  ABDOMEN: + Bowel sounds, Soft, NT, ND   EXTREMITIES:  2+ Peripheral Pulses, No clubbing, cyanosis, or edema  NEUROLOGY: non-focal, aaox3  Lymphatic: No lymphadenopathy  SKIN: No rashes or lesions    Interpretation of Telemetry:                   11.0 <10.5<110  7.41  )-----------( 138               33.2     131<130<129 |  101  |  36<H>  ----------------------------<  155<H>  4.5   |  19<L>  |  1.37<1.48    Ca    9.7     Phos  3.0     Mg     2.0     TPro  6.9  /  Alb  3.8  /  TBili  0.3  /  DBili  x   /  AST  33  /  ALT  54<H>  /  AlkPhos  64  F/S [phone]

## 2023-12-02 NOTE — PROGRESS NOTE ADULT - ASSESSMENT
58 yrs old male w/ hx of HFrEF (LVIDd 6.4 cm, LVEF 32%), CAD s/p PCI (2008), HTN, DMT2 (A1c 8.3%) and CVA s/p TPA (2018), recently treated for PNA who initially presented to [facility] via EMS after syncope reportedly requiring defibrilation. Treated for ACS but left AMA to come to [facility]. Pt has covid and was placed on IABP for hypotension. Now being evaluated for Heart transplant Vs LVAD placement. Nephrology consulted for ARIC.

## 2023-12-03 LAB
ALBUMIN SERPL ELPH-MCNC: 3.4 G/DL — SIGNIFICANT CHANGE UP (ref 3.3–5)
ALBUMIN SERPL ELPH-MCNC: 3.4 G/DL — SIGNIFICANT CHANGE UP (ref 3.3–5)
ALP SERPL-CCNC: 60 U/L — SIGNIFICANT CHANGE UP (ref 40–120)
ALP SERPL-CCNC: 60 U/L — SIGNIFICANT CHANGE UP (ref 40–120)
ALT FLD-CCNC: 82 U/L — HIGH (ref 10–45)
ALT FLD-CCNC: 82 U/L — HIGH (ref 10–45)
ANION GAP SERPL CALC-SCNC: 11 MMOL/L — SIGNIFICANT CHANGE UP (ref 5–17)
ANION GAP SERPL CALC-SCNC: 11 MMOL/L — SIGNIFICANT CHANGE UP (ref 5–17)
APTT BLD: 70.5 SEC — HIGH (ref 24.5–35.6)
APTT BLD: 70.5 SEC — HIGH (ref 24.5–35.6)
AST SERPL-CCNC: 44 U/L — HIGH (ref 10–40)
AST SERPL-CCNC: 44 U/L — HIGH (ref 10–40)
BASOPHILS # BLD AUTO: 0.03 K/UL — SIGNIFICANT CHANGE UP (ref 0–0.2)
BASOPHILS # BLD AUTO: 0.03 K/UL — SIGNIFICANT CHANGE UP (ref 0–0.2)
BASOPHILS NFR BLD AUTO: 0.4 % — SIGNIFICANT CHANGE UP (ref 0–2)
BASOPHILS NFR BLD AUTO: 0.4 % — SIGNIFICANT CHANGE UP (ref 0–2)
BILIRUB SERPL-MCNC: 0.3 MG/DL — SIGNIFICANT CHANGE UP (ref 0.2–1.2)
BILIRUB SERPL-MCNC: 0.3 MG/DL — SIGNIFICANT CHANGE UP (ref 0.2–1.2)
BUN SERPL-MCNC: 28 MG/DL — HIGH (ref 7–23)
BUN SERPL-MCNC: 28 MG/DL — HIGH (ref 7–23)
CALCIUM SERPL-MCNC: 9.5 MG/DL — SIGNIFICANT CHANGE UP (ref 8.4–10.5)
CALCIUM SERPL-MCNC: 9.5 MG/DL — SIGNIFICANT CHANGE UP (ref 8.4–10.5)
CHLORIDE SERPL-SCNC: 101 MMOL/L — SIGNIFICANT CHANGE UP (ref 96–108)
CHLORIDE SERPL-SCNC: 101 MMOL/L — SIGNIFICANT CHANGE UP (ref 96–108)
CO2 SERPL-SCNC: 18 MMOL/L — LOW (ref 22–31)
CO2 SERPL-SCNC: 18 MMOL/L — LOW (ref 22–31)
CREAT SERPL-MCNC: 1.21 MG/DL — SIGNIFICANT CHANGE UP (ref 0.5–1.3)
CREAT SERPL-MCNC: 1.21 MG/DL — SIGNIFICANT CHANGE UP (ref 0.5–1.3)
EGFR: 69 ML/MIN/1.73M2 — SIGNIFICANT CHANGE UP
EGFR: 69 ML/MIN/1.73M2 — SIGNIFICANT CHANGE UP
EOSINOPHIL # BLD AUTO: 0.34 K/UL — SIGNIFICANT CHANGE UP (ref 0–0.5)
EOSINOPHIL # BLD AUTO: 0.34 K/UL — SIGNIFICANT CHANGE UP (ref 0–0.5)
EOSINOPHIL NFR BLD AUTO: 4.7 % — SIGNIFICANT CHANGE UP (ref 0–6)
EOSINOPHIL NFR BLD AUTO: 4.7 % — SIGNIFICANT CHANGE UP (ref 0–6)
GLUCOSE BLDC GLUCOMTR-MCNC: 124 MG/DL — HIGH (ref 70–99)
GLUCOSE BLDC GLUCOMTR-MCNC: 127 MG/DL — HIGH (ref 70–99)
GLUCOSE BLDC GLUCOMTR-MCNC: 127 MG/DL — HIGH (ref 70–99)
GLUCOSE BLDC GLUCOMTR-MCNC: 214 MG/DL — HIGH (ref 70–99)
GLUCOSE BLDC GLUCOMTR-MCNC: 214 MG/DL — HIGH (ref 70–99)
GLUCOSE SERPL-MCNC: 121 MG/DL — HIGH (ref 70–99)
GLUCOSE SERPL-MCNC: 121 MG/DL — HIGH (ref 70–99)
HCT VFR BLD CALC: 32.3 % — LOW (ref 39–50)
HCT VFR BLD CALC: 32.3 % — LOW (ref 39–50)
HEPARIN-PF4 AB RESULT: <0.6 U/ML — SIGNIFICANT CHANGE UP (ref 0–0.9)
HEPARIN-PF4 AB RESULT: <0.6 U/ML — SIGNIFICANT CHANGE UP (ref 0–0.9)
HGB BLD-MCNC: 10.8 G/DL — LOW (ref 13–17)
HGB BLD-MCNC: 10.8 G/DL — LOW (ref 13–17)
IMM GRANULOCYTES NFR BLD AUTO: 0.4 % — SIGNIFICANT CHANGE UP (ref 0–0.9)
IMM GRANULOCYTES NFR BLD AUTO: 0.4 % — SIGNIFICANT CHANGE UP (ref 0–0.9)
INR BLD: 1.18 RATIO — SIGNIFICANT CHANGE UP (ref 0.85–1.18)
INR BLD: 1.18 RATIO — SIGNIFICANT CHANGE UP (ref 0.85–1.18)
LYMPHOCYTES # BLD AUTO: 1.35 K/UL — SIGNIFICANT CHANGE UP (ref 1–3.3)
LYMPHOCYTES # BLD AUTO: 1.35 K/UL — SIGNIFICANT CHANGE UP (ref 1–3.3)
LYMPHOCYTES # BLD AUTO: 18.7 % — SIGNIFICANT CHANGE UP (ref 13–44)
LYMPHOCYTES # BLD AUTO: 18.7 % — SIGNIFICANT CHANGE UP (ref 13–44)
MAGNESIUM SERPL-MCNC: 1.7 MG/DL — SIGNIFICANT CHANGE UP (ref 1.6–2.6)
MAGNESIUM SERPL-MCNC: 1.7 MG/DL — SIGNIFICANT CHANGE UP (ref 1.6–2.6)
MCHC RBC-ENTMCNC: 29.8 PG — SIGNIFICANT CHANGE UP (ref 27–34)
MCHC RBC-ENTMCNC: 29.8 PG — SIGNIFICANT CHANGE UP (ref 27–34)
MCHC RBC-ENTMCNC: 33.4 GM/DL — SIGNIFICANT CHANGE UP (ref 32–36)
MCHC RBC-ENTMCNC: 33.4 GM/DL — SIGNIFICANT CHANGE UP (ref 32–36)
MCV RBC AUTO: 89.2 FL — SIGNIFICANT CHANGE UP (ref 80–100)
MCV RBC AUTO: 89.2 FL — SIGNIFICANT CHANGE UP (ref 80–100)
MONOCYTES # BLD AUTO: 0.58 K/UL — SIGNIFICANT CHANGE UP (ref 0–0.9)
MONOCYTES # BLD AUTO: 0.58 K/UL — SIGNIFICANT CHANGE UP (ref 0–0.9)
MONOCYTES NFR BLD AUTO: 8 % — SIGNIFICANT CHANGE UP (ref 2–14)
MONOCYTES NFR BLD AUTO: 8 % — SIGNIFICANT CHANGE UP (ref 2–14)
NEUTROPHILS # BLD AUTO: 4.88 K/UL — SIGNIFICANT CHANGE UP (ref 1.8–7.4)
NEUTROPHILS # BLD AUTO: 4.88 K/UL — SIGNIFICANT CHANGE UP (ref 1.8–7.4)
NEUTROPHILS NFR BLD AUTO: 67.8 % — SIGNIFICANT CHANGE UP (ref 43–77)
NEUTROPHILS NFR BLD AUTO: 67.8 % — SIGNIFICANT CHANGE UP (ref 43–77)
NRBC # BLD: 0 /100 WBCS — SIGNIFICANT CHANGE UP (ref 0–0)
NRBC # BLD: 0 /100 WBCS — SIGNIFICANT CHANGE UP (ref 0–0)
PF4 HEPARIN CMPLX AB SER-ACNC: NEGATIVE — SIGNIFICANT CHANGE UP
PF4 HEPARIN CMPLX AB SER-ACNC: NEGATIVE — SIGNIFICANT CHANGE UP
PHOSPHATE SERPL-MCNC: 2.9 MG/DL — SIGNIFICANT CHANGE UP (ref 2.5–4.5)
PHOSPHATE SERPL-MCNC: 2.9 MG/DL — SIGNIFICANT CHANGE UP (ref 2.5–4.5)
PLATELET # BLD AUTO: 126 K/UL — LOW (ref 150–400)
PLATELET # BLD AUTO: 126 K/UL — LOW (ref 150–400)
POTASSIUM SERPL-MCNC: 4 MMOL/L — SIGNIFICANT CHANGE UP (ref 3.5–5.3)
POTASSIUM SERPL-MCNC: 4 MMOL/L — SIGNIFICANT CHANGE UP (ref 3.5–5.3)
POTASSIUM SERPL-SCNC: 4 MMOL/L — SIGNIFICANT CHANGE UP (ref 3.5–5.3)
POTASSIUM SERPL-SCNC: 4 MMOL/L — SIGNIFICANT CHANGE UP (ref 3.5–5.3)
PROT SERPL-MCNC: 6.4 G/DL — SIGNIFICANT CHANGE UP (ref 6–8.3)
PROT SERPL-MCNC: 6.4 G/DL — SIGNIFICANT CHANGE UP (ref 6–8.3)
PROTHROM AB SERPL-ACNC: 12.3 SEC — SIGNIFICANT CHANGE UP (ref 9.5–13)
PROTHROM AB SERPL-ACNC: 12.3 SEC — SIGNIFICANT CHANGE UP (ref 9.5–13)
RBC # BLD: 3.62 M/UL — LOW (ref 4.2–5.8)
RBC # BLD: 3.62 M/UL — LOW (ref 4.2–5.8)
RBC # FLD: 14.8 % — HIGH (ref 10.3–14.5)
RBC # FLD: 14.8 % — HIGH (ref 10.3–14.5)
SODIUM SERPL-SCNC: 130 MMOL/L — LOW (ref 135–145)
SODIUM SERPL-SCNC: 130 MMOL/L — LOW (ref 135–145)
UFH PPP CHRO-ACNC: 0.47 IU/ML — SIGNIFICANT CHANGE UP (ref 0.3–0.7)
UFH PPP CHRO-ACNC: 0.47 IU/ML — SIGNIFICANT CHANGE UP (ref 0.3–0.7)
WBC # BLD: 7.21 K/UL — SIGNIFICANT CHANGE UP (ref 3.8–10.5)
WBC # BLD: 7.21 K/UL — SIGNIFICANT CHANGE UP (ref 3.8–10.5)
WBC # FLD AUTO: 7.21 K/UL — SIGNIFICANT CHANGE UP (ref 3.8–10.5)
WBC # FLD AUTO: 7.21 K/UL — SIGNIFICANT CHANGE UP (ref 3.8–10.5)

## 2023-12-03 PROCEDURE — 99291 CRITICAL CARE FIRST HOUR: CPT

## 2023-12-03 PROCEDURE — 99291 CRITICAL CARE FIRST HOUR: CPT | Mod: GC

## 2023-12-03 PROCEDURE — 93010 ELECTROCARDIOGRAM REPORT: CPT

## 2023-12-03 PROCEDURE — 99292 CRITICAL CARE ADDL 30 MIN: CPT

## 2023-12-03 PROCEDURE — 99232 SBSQ HOSP IP/OBS MODERATE 35: CPT | Mod: GC

## 2023-12-03 PROCEDURE — 71045 X-RAY EXAM CHEST 1 VIEW: CPT | Mod: 26

## 2023-12-03 RX ORDER — MAGNESIUM SULFATE 500 MG/ML
2 VIAL (ML) INJECTION
Refills: 0 | Status: COMPLETED | OUTPATIENT
Start: 2023-12-03 | End: 2023-12-03

## 2023-12-03 RX ADMIN — ATORVASTATIN CALCIUM 80 MILLIGRAM(S): 80 TABLET, FILM COATED ORAL at 22:10

## 2023-12-03 RX ADMIN — ISOSORBIDE DINITRATE 40 MILLIGRAM(S): 5 TABLET ORAL at 05:09

## 2023-12-03 RX ADMIN — SENNA PLUS 2 TABLET(S): 8.6 TABLET ORAL at 22:09

## 2023-12-03 RX ADMIN — INSULIN GLARGINE 12 UNIT(S): 100 INJECTION, SOLUTION SUBCUTANEOUS at 22:10

## 2023-12-03 RX ADMIN — CARVEDILOL PHOSPHATE 25 MILLIGRAM(S): 80 CAPSULE, EXTENDED RELEASE ORAL at 05:09

## 2023-12-03 RX ADMIN — CARVEDILOL PHOSPHATE 25 MILLIGRAM(S): 80 CAPSULE, EXTENDED RELEASE ORAL at 17:36

## 2023-12-03 RX ADMIN — Medication 9 UNIT(S): at 17:37

## 2023-12-03 RX ADMIN — CHLORHEXIDINE GLUCONATE 1 APPLICATION(S): 213 SOLUTION TOPICAL at 22:12

## 2023-12-03 RX ADMIN — Medication 100 MILLIGRAM(S): at 22:37

## 2023-12-03 RX ADMIN — ISOSORBIDE DINITRATE 40 MILLIGRAM(S): 5 TABLET ORAL at 11:57

## 2023-12-03 RX ADMIN — Medication 2: at 17:37

## 2023-12-03 RX ADMIN — AMIODARONE HYDROCHLORIDE 200 MILLIGRAM(S): 400 TABLET ORAL at 05:09

## 2023-12-03 RX ADMIN — Medication 100 MILLIGRAM(S): at 08:24

## 2023-12-03 RX ADMIN — ISOSORBIDE DINITRATE 40 MILLIGRAM(S): 5 TABLET ORAL at 17:36

## 2023-12-03 RX ADMIN — Medication 9 UNIT(S): at 08:24

## 2023-12-03 RX ADMIN — Medication 81 MILLIGRAM(S): at 11:57

## 2023-12-03 RX ADMIN — Medication 25 GRAM(S): at 02:42

## 2023-12-03 RX ADMIN — Medication 100 MILLIGRAM(S): at 14:14

## 2023-12-03 RX ADMIN — BUDESONIDE AND FORMOTEROL FUMARATE DIHYDRATE 2 PUFF(S): 160; 4.5 AEROSOL RESPIRATORY (INHALATION) at 08:20

## 2023-12-03 RX ADMIN — Medication 9 UNIT(S): at 11:57

## 2023-12-03 RX ADMIN — Medication 25 GRAM(S): at 05:08

## 2023-12-03 NOTE — PROGRESS NOTE ADULT - SUBJECTIVE AND OBJECTIVE BOX
LD VALENCIA  58y Male  MRN:[id number]    Patient is a 58y old  Male who presents with a chief complaint of NSTEMI (01 Nov 2023 20:29)    HPI:  59yo M w/ hx HTN, CAD w/ 1 stent in 2009, ICH (2008) presenting with abn ekg. Patient presented to [facility] where he was found to have STEMI, recommended to get cath however patient did not want to get it there so it left and came here.  Patient initially had cough, congestion, fever, was placed on antibiotics on Sunday.  Started feeling nauseous and had a presyncopal event after which he presented to ED last night.  Had chest pain as well.  Chest pain is midsternal.  Not currently having chest pain.  Received 4 aspirin 30 min pta. (01 Nov 2023 15:11)      Patient seen and evaluated at bedside in CCU. interval events noted    Interval HPI: remain in ccu. IABP in place        PAST MEDICAL & SURGICAL HISTORY:  HTN (hypertension)      CAD (coronary artery disease)  2009; stent      Intracranial hemorrhage  2008      Respiratory arrest  december 1st      Myocardial infarction, unspecified MI type, unspecified artery      History of coronary artery stent placement          REVIEW OF SYSTEMS:  as per hpi     VITALS:   ICU Vital Signs Last 24 Hrs  T(C): 36.8 (03 Dec 2023 07:00), Max: 36.9 (03 Dec 2023 03:00)  T(F): 98.3 (03 Dec 2023 07:00), Max: 98.4 (03 Dec 2023 03:00)  HR: 72 (03 Dec 2023 12:00) (69 - 81)  BP: 109/56 (02 Dec 2023 20:00) (109/56 - 109/56)  BP(mean): 77 (02 Dec 2023 20:00) (77 - 77)  ABP: --  ABP(mean): --  RR: 19 (03 Dec 2023 12:00) (16 - 27)  SpO2: 98% (03 Dec 2023 12:00) (93% - 99%)    O2 Parameters below as of 03 Dec 2023 12:00  Patient On (Oxygen Delivery Method): room air                PHYSICAL EXAM:  GENERAL: NAD, comfortable   HEAD:  Atraumatic, Normocephalic  EYES: EOMI, PERRLA, conjunctiva and sclera clear  NECK: Supple, No JVD   CHEST/LUNG: Clear to auscultation bilaterally; No wheeze  HEART: Regular rate and rhythm; No murmurs, rubs, or gallops  ABDOMEN: Soft, Nontender, Nondistended; Bowel sounds present  EXTREMITIES:  2+ Peripheral Pulses, No clubbing, cyanosis, or edema  NEUROLOGY: nonfocal  SKIN: No rashes or lesions  +iabp    Consultant(s) Notes Reviewed:  [x ] YES  [ ] NO  Care Discussed with Consultants/Other Providers [ x] YES  [ ] NO    MEDS:   MEDICATIONS  (STANDING):  aMIOdarone    Tablet   Oral   aMIOdarone    Tablet 200 milliGRAM(s) Oral daily  aspirin  chewable 81 milliGRAM(s) Oral daily  atorvastatin 80 milliGRAM(s) Oral at bedtime  budesonide  80 MICROgram(s)/formoterol 4.5 MICROgram(s) Inhaler 2 Puff(s) Inhalation two times a day  carvedilol 25 milliGRAM(s) Oral every 12 hours  chlorhexidine 2% Cloths 1 Application(s) Topical daily  heparin  Infusion 1300 Unit(s)/Hr (14 mL/Hr) IV Continuous <Continuous>  hydrALAZINE 100 milliGRAM(s) Oral three times a day  insulin glargine Injectable (LANTUS) 12 Unit(s) SubCutaneous at bedtime  insulin lispro (ADMELOG) corrective regimen sliding scale   SubCutaneous at bedtime  insulin lispro (ADMELOG) corrective regimen sliding scale   SubCutaneous three times a day before meals  insulin lispro Injectable (ADMELOG) 9 Unit(s) SubCutaneous three times a day before meals  isosorbide   dinitrate Tablet (ISORDIL) 40 milliGRAM(s) Oral three times a day  polyethylene glycol 3350 17 Gram(s) Oral two times a day  senna 2 Tablet(s) Oral at bedtime    MEDICATIONS  (PRN):  hydrOXYzine hydrochloride 25 milliGRAM(s) Oral two times a day PRN Anxiety      ALLERGIES:  penicillins (Unknown)      LABS:                                     10.8   7.21  )-----------( 126      ( 03 Dec 2023 00:36 )             32.3   12-03    130<L>  |  101  |  28<H>  ----------------------------<  121<H>  4.0   |  18<L>  |  1.21    Ca    9.5      03 Dec 2023 00:36  Phos  2.9     12-03  Mg     1.7     12-03    TPro  6.4  /  Alb  3.4  /  TBili  0.3  /  DBili  x   /  AST  44<H>  /  ALT  82<H>  /  AlkPhos  60  12-03        < from: CT Abdomen and Pelvis No Cont (11.28.23 @ 03:38) >  IMPRESSION:  Mildly dilated colon and prominent but not overly dilated small bowel   with air-fluid levels. No discrete transition point. Findings are   suggestive of ileus.    Intra-aortic balloon pump with the inferior marker at the level of the   inferior mesenteric artery and the balloon overlying the origins of the   renal arteries, celiac artery, and superior mesenteric artery. Consider   repositioning. Concern for IABP positioning was discussed with LANETTE Hawthorne   on 11/28/2023 at 3:42 AM by Dr. Shepard.    < end of copied text >          < from: Colonoscopy (11.17.23 @ 10:35) >                                                                                                        Impression:          - The entire examined colon is normal on direct and retroflexion views.                       - No specimens collected.  Recommendation:      - Resume previous diet today.                       - No large polyps or masses detected, No objection from GI standpoint to                 proceed with heart transplantation/advanced heart therapies.                       - Please call back should any further questions or concerns arise.    < end of copied text >     < from: Xray Chest 1 View- PORTABLE-Urgent (11.01.23 @ 07:42) >    IMPRESSION:  Clear lungs.    ---End of Report ---        < end of copied text >  < from: TTE W or WO Ultrasound Enhancing Agent (11.01.23 @ 10:23) >  _____________________________     CONCLUSIONS:      1. Left ventricular cavity is moderately dilated. Left ventricular wall thickness is normal. Left ventricular systolic function is severely decreased with an ejection fraction of 32 % by Chinchilla's method of disks. Regional wall motion abnormalities present.   2. Multiple segmental abnormalities exist. See findings.   3. There is moderate (grade 2) left ventricular diastolic dysfunction, with indeterminant filling pressure.   4. Normal right ventricular cavity size, wall thickness, and systolic function.   5. No significant valvular disease.   6. No pericardial effusion seen.   7. Compared to the transthoracic echocardiogram performed on 1/25/2017 the areas of akinesis are unchanged but there has been a decline in LV systolic function with new areas of hypokinesis.    __________________________________________________________________    < end of copied text >  < from: TTE Limited W or WO Ultrasound Enhancing Agent (11.02.23 @ 07:41) >  __________________________     CONCLUSIONS:      1. After obtaining consent, Definity ultrasound enhancing agent was given for enhanced left ventricular opacification and improved delineation of the left ventricular endocardial borders. Left ventricular systolic function is severely decreased with a calculated ejection fraction of 22 % by the Chinchilla's biplane method of disks. There is a left ventricular thrombus.   2. Findings were discussed with Litzy BOSS on 11/2/2023 at 8.49am.   3. There is a left ventricular thrombus.    _________________________________________________________________    < end of copied text >   LD VALENCIA  58y Male  MRN:[id number]    Patient is a 58y old  Male who presents with a chief complaint of NSTEMI (01 Nov 2023 20:29)    HPI:  57yo M w/ hx HTN, CAD w/ 1 stent in 2009, ICH (2008) presenting with abn ekg. Patient presented to [facility] where he was found to have STEMI, recommended to get cath however patient did not want to get it there so it left and came here.  Patient initially had cough, congestion, fever, was placed on antibiotics on Sunday.  Started feeling nauseous and had a presyncopal event after which he presented to ED last night.  Had chest pain as well.  Chest pain is midsternal.  Not currently having chest pain.  Received 4 aspirin 30 min pta. (01 Nov 2023 15:11)      Patient seen and evaluated at bedside in CCU. interval events noted    Interval HPI: remain in ccu. IABP in place        PAST MEDICAL & SURGICAL HISTORY:  HTN (hypertension)      CAD (coronary artery disease)  2009; stent      Intracranial hemorrhage  2008      Respiratory arrest  december 1st      Myocardial infarction, unspecified MI type, unspecified artery      History of coronary artery stent placement          REVIEW OF SYSTEMS:  as per hpi     VITALS:   ICU Vital Signs Last 24 Hrs  T(C): 36.8 (03 Dec 2023 07:00), Max: 36.9 (03 Dec 2023 03:00)  T(F): 98.3 (03 Dec 2023 07:00), Max: 98.4 (03 Dec 2023 03:00)  HR: 72 (03 Dec 2023 12:00) (69 - 81)  BP: 109/56 (02 Dec 2023 20:00) (109/56 - 109/56)  BP(mean): 77 (02 Dec 2023 20:00) (77 - 77)  ABP: --  ABP(mean): --  RR: 19 (03 Dec 2023 12:00) (16 - 27)  SpO2: 98% (03 Dec 2023 12:00) (93% - 99%)    O2 Parameters below as of 03 Dec 2023 12:00  Patient On (Oxygen Delivery Method): room air                PHYSICAL EXAM:  GENERAL: NAD, comfortable   HEAD:  Atraumatic, Normocephalic  EYES: EOMI, PERRLA, conjunctiva and sclera clear  NECK: Supple, No JVD   CHEST/LUNG: Clear to auscultation bilaterally; No wheeze  HEART: Regular rate and rhythm; No murmurs, rubs, or gallops  ABDOMEN: Soft, Nontender, Nondistended; Bowel sounds present  EXTREMITIES:  2+ Peripheral Pulses, No clubbing, cyanosis, or edema  NEUROLOGY: nonfocal  SKIN: No rashes or lesions  +iabp    Consultant(s) Notes Reviewed:  [x ] YES  [ ] NO  Care Discussed with Consultants/Other Providers [ x] YES  [ ] NO    MEDS:   MEDICATIONS  (STANDING):  aMIOdarone    Tablet   Oral   aMIOdarone    Tablet 200 milliGRAM(s) Oral daily  aspirin  chewable 81 milliGRAM(s) Oral daily  atorvastatin 80 milliGRAM(s) Oral at bedtime  budesonide  80 MICROgram(s)/formoterol 4.5 MICROgram(s) Inhaler 2 Puff(s) Inhalation two times a day  carvedilol 25 milliGRAM(s) Oral every 12 hours  chlorhexidine 2% Cloths 1 Application(s) Topical daily  heparin  Infusion 1300 Unit(s)/Hr (14 mL/Hr) IV Continuous <Continuous>  hydrALAZINE 100 milliGRAM(s) Oral three times a day  insulin glargine Injectable (LANTUS) 12 Unit(s) SubCutaneous at bedtime  insulin lispro (ADMELOG) corrective regimen sliding scale   SubCutaneous at bedtime  insulin lispro (ADMELOG) corrective regimen sliding scale   SubCutaneous three times a day before meals  insulin lispro Injectable (ADMELOG) 9 Unit(s) SubCutaneous three times a day before meals  isosorbide   dinitrate Tablet (ISORDIL) 40 milliGRAM(s) Oral three times a day  polyethylene glycol 3350 17 Gram(s) Oral two times a day  senna 2 Tablet(s) Oral at bedtime    MEDICATIONS  (PRN):  hydrOXYzine hydrochloride 25 milliGRAM(s) Oral two times a day PRN Anxiety      ALLERGIES:  penicillins (Unknown)      LABS:                                     10.8   7.21  )-----------( 126      ( 03 Dec 2023 00:36 )             32.3   12-03    130<L>  |  101  |  28<H>  ----------------------------<  121<H>  4.0   |  18<L>  |  1.21    Ca    9.5      03 Dec 2023 00:36  Phos  2.9     12-03  Mg     1.7     12-03    TPro  6.4  /  Alb  3.4  /  TBili  0.3  /  DBili  x   /  AST  44<H>  /  ALT  82<H>  /  AlkPhos  60  12-03        < from: CT Abdomen and Pelvis No Cont (11.28.23 @ 03:38) >  IMPRESSION:  Mildly dilated colon and prominent but not overly dilated small bowel   with air-fluid levels. No discrete transition point. Findings are   suggestive of ileus.    Intra-aortic balloon pump with the inferior marker at the level of the   inferior mesenteric artery and the balloon overlying the origins of the   renal arteries, celiac artery, and superior mesenteric artery. Consider   repositioning. Concern for IABP positioning was discussed with LANETTE Hawthorne   on 11/28/2023 at 3:42 AM by Dr. Shepard.    < end of copied text >          < from: Colonoscopy (11.17.23 @ 10:35) >                                                                                                        Impression:          - The entire examined colon is normal on direct and retroflexion views.                       - No specimens collected.  Recommendation:      - Resume previous diet today.                       - No large polyps or masses detected, No objection from GI standpoint to                 proceed with heart transplantation/advanced heart therapies.                       - Please call back should any further questions or concerns arise.    < end of copied text >     < from: Xray Chest 1 View- PORTABLE-Urgent (11.01.23 @ 07:42) >    IMPRESSION:  Clear lungs.    ---End of Report ---        < end of copied text >  < from: TTE W or WO Ultrasound Enhancing Agent (11.01.23 @ 10:23) >  _____________________________     CONCLUSIONS:      1. Left ventricular cavity is moderately dilated. Left ventricular wall thickness is normal. Left ventricular systolic function is severely decreased with an ejection fraction of 32 % by Chinchilla's method of disks. Regional wall motion abnormalities present.   2. Multiple segmental abnormalities exist. See findings.   3. There is moderate (grade 2) left ventricular diastolic dysfunction, with indeterminant filling pressure.   4. Normal right ventricular cavity size, wall thickness, and systolic function.   5. No significant valvular disease.   6. No pericardial effusion seen.   7. Compared to the transthoracic echocardiogram performed on 1/25/2017 the areas of akinesis are unchanged but there has been a decline in LV systolic function with new areas of hypokinesis.    __________________________________________________________________    < end of copied text >  < from: TTE Limited W or WO Ultrasound Enhancing Agent (11.02.23 @ 07:41) >  __________________________     CONCLUSIONS:      1. After obtaining consent, Definity ultrasound enhancing agent was given for enhanced left ventricular opacification and improved delineation of the left ventricular endocardial borders. Left ventricular systolic function is severely decreased with a calculated ejection fraction of 22 % by the Chinchilla's biplane method of disks. There is a left ventricular thrombus.   2. Findings were discussed with Litzy BOSS on 11/2/2023 at 8.49am.   3. There is a left ventricular thrombus.    _________________________________________________________________    < end of copied text >   LD VALENCIA  58y Male  MRN:[id number]    Patient is a 58y old  Male who presents with a chief complaint of NSTEMI (01 Nov 2023 20:29)    HPI:  59yo M w/ hx HTN, CAD w/ 1 stent in 2009, ICH (2008) presenting with abn ekg. Patient presented to [facility] where he was found to have STEMI, recommended to get cath however patient did not want to get it there so it left and came here.  Patient initially had cough, congestion, fever, was placed on antibiotics on Sunday.  Started feeling nauseous and had a presyncopal event after which he presented to ED last night.  Had chest pain as well.  Chest pain is midsternal.  Not currently having chest pain.  Received 4 aspirin 30 min pta. (01 Nov 2023 15:11)      Patient seen and evaluated at bedside in CCU. interval events noted    Interval HPI: remain in ccu. IABP in place        PAST MEDICAL & SURGICAL HISTORY:  HTN (hypertension)      CAD (coronary artery disease)  2009; stent      Intracranial hemorrhage  2008      Respiratory arrest  december 1st      Myocardial infarction, unspecified MI type, unspecified artery      History of coronary artery stent placement          REVIEW OF SYSTEMS:  as per hpi     VITALS:   ICU Vital Signs Last 24 Hrs  T(C): 36.8 (03 Dec 2023 07:00), Max: 36.9 (03 Dec 2023 03:00)  T(F): 98.3 (03 Dec 2023 07:00), Max: 98.4 (03 Dec 2023 03:00)  HR: 72 (03 Dec 2023 12:00) (69 - 81)  BP: 109/56 (02 Dec 2023 20:00) (109/56 - 109/56)  BP(mean): 77 (02 Dec 2023 20:00) (77 - 77)  ABP: --  ABP(mean): --  RR: 19 (03 Dec 2023 12:00) (16 - 27)  SpO2: 98% (03 Dec 2023 12:00) (93% - 99%)    O2 Parameters below as of 03 Dec 2023 12:00  Patient On (Oxygen Delivery Method): room air                PHYSICAL EXAM:  GENERAL: NAD, comfortable   HEAD:  Atraumatic, Normocephalic  EYES: EOMI, PERRLA, conjunctiva and sclera clear  NECK: Supple, No JVD   CHEST/LUNG: Clear to auscultation bilaterally; No wheeze  HEART: Regular rate and rhythm; No murmurs, rubs, or gallops  ABDOMEN: Soft, Nontender, Nondistended; Bowel sounds present  EXTREMITIES:  2+ Peripheral Pulses, No clubbing, cyanosis, or edema  NEUROLOGY: nonfocal  SKIN: No rashes or lesions  +iabp    Consultant(s) Notes Reviewed:  [x ] YES  [ ] NO  Care Discussed with Consultants/Other Providers [ x] YES  [ ] NO    MEDS:   MEDICATIONS  (STANDING):  aMIOdarone    Tablet   Oral   aMIOdarone    Tablet 200 milliGRAM(s) Oral daily  aspirin  chewable 81 milliGRAM(s) Oral daily  atorvastatin 80 milliGRAM(s) Oral at bedtime  budesonide  80 MICROgram(s)/formoterol 4.5 MICROgram(s) Inhaler 2 Puff(s) Inhalation two times a day  carvedilol 25 milliGRAM(s) Oral every 12 hours  chlorhexidine 2% Cloths 1 Application(s) Topical daily  heparin  Infusion 1300 Unit(s)/Hr (14 mL/Hr) IV Continuous <Continuous>  hydrALAZINE 100 milliGRAM(s) Oral three times a day  insulin glargine Injectable (LANTUS) 12 Unit(s) SubCutaneous at bedtime  insulin lispro (ADMELOG) corrective regimen sliding scale   SubCutaneous at bedtime  insulin lispro (ADMELOG) corrective regimen sliding scale   SubCutaneous three times a day before meals  insulin lispro Injectable (ADMELOG) 9 Unit(s) SubCutaneous three times a day before meals  isosorbide   dinitrate Tablet (ISORDIL) 40 milliGRAM(s) Oral three times a day  polyethylene glycol 3350 17 Gram(s) Oral two times a day  senna 2 Tablet(s) Oral at bedtime    MEDICATIONS  (PRN):  hydrOXYzine hydrochloride 25 milliGRAM(s) Oral two times a day PRN Anxiety      ALLERGIES:  penicillins (Unknown)      LABS:                                     10.8   7.21  )-----------( 126      ( 03 Dec 2023 00:36 )             32.3   12-03    130<L>  |  101  |  28<H>  ----------------------------<  121<H>  4.0   |  18<L>  |  1.21    Ca    9.5      03 Dec 2023 00:36  Phos  2.9     12-03  Mg     1.7     12-03    TPro  6.4  /  Alb  3.4  /  TBili  0.3  /  DBili  x   /  AST  44<H>  /  ALT  82<H>  /  AlkPhos  60  12-03        < from: CT Abdomen and Pelvis No Cont (11.28.23 @ 03:38) >  IMPRESSION:  Mildly dilated colon and prominent but not overly dilated small bowel   with air-fluid levels. No discrete transition point. Findings are   suggestive of ileus.    Intra-aortic balloon pump with the inferior marker at the level of the   inferior mesenteric artery and the balloon overlying the origins of the   renal arteries, celiac artery, and superior mesenteric artery. Consider   repositioning. Concern for IABP positioning was discussed with LANETTE Hawthorne   on 11/28/2023 at 3:42 AM by Dr. Shepard.    < end of copied text >          < from: Colonoscopy (11.17.23 @ 10:35) >                                                                                                        Impression:          - The entire examined colon is normal on direct and retroflexion views.                       - No specimens collected.  Recommendation:      - Resume previous diet today.                       - No large polyps or masses detected, No objection from GI standpoint to                 proceed with heart transplantation/advanced heart therapies.                       - Please call back should any further questions or concerns arise.    < end of copied text >     < from: Xray Chest 1 View- PORTABLE-Urgent (11.01.23 @ 07:42) >    IMPRESSION:  Clear lungs.    ---End of Report ---        < end of copied text >  < from: TTE W or WO Ultrasound Enhancing Agent (11.01.23 @ 10:23) >  _____________________________     CONCLUSIONS:      1. Left ventricular cavity is moderately dilated. Left ventricular wall thickness is normal. Left ventricular systolic function is severely decreased with an ejection fraction of 32 % by Chinchilla's method of disks. Regional wall motion abnormalities present.   2. Multiple segmental abnormalities exist. See findings.   3. There is moderate (grade 2) left ventricular diastolic dysfunction, with indeterminant filling pressure.   4. Normal right ventricular cavity size, wall thickness, and systolic function.   5. No significant valvular disease.   6. No pericardial effusion seen.   7. Compared to the transthoracic echocardiogram performed on 1/25/2017 the areas of akinesis are unchanged but there has been a decline in LV systolic function with new areas of hypokinesis.    __________________________________________________________________    < end of copied text >  < from: TTE Limited W or WO Ultrasound Enhancing Agent (11.02.23 @ 07:41) >  __________________________     CONCLUSIONS:      1. After obtaining consent, Definity ultrasound enhancing agent was given for enhanced left ventricular opacification and improved delineation of the left ventricular endocardial borders. Left ventricular systolic function is severely decreased with a calculated ejection fraction of 22 % by the Chinchilla's biplane method of disks. There is a left ventricular thrombus.   2. Findings were discussed with Litzy BOSS on 11/2/2023 at 8.49am.   3. There is a left ventricular thrombus.    _________________________________________________________________    < end of copied text >

## 2023-12-03 NOTE — PROGRESS NOTE ADULT - SUBJECTIVE AND OBJECTIVE BOX
DEVORAH VALENCIA  MRN-[id number]  Patient is a 58y old  Male who presents with a chief complaint of cardiogenic shock (03 Dec 2023 06:44)    HPI:  pt seen and approx 1:30 pm  in [facility]    57yo M w/ hx HTN, CAD w/ 1 stent in , ICH () presenting with abn ekg. Patient presented to [facility] where he was found to have STEMI, recommended to get cath however patient did not want to get it there so it left and came here.  Patient initially had cough, congestion, fever, was placed on antibiotics on .  Started feeling nauseous and had a presyncopal event after which he presented to ED last night.  Had chest pain as well.  Chest pain is midsternal.  Not currently having chest pain.  Received 4 aspirin 30 min pta. (2023 15:11)      Hospital Course:    24 HOUR EVENTS:    REVIEW OF SYSTEMS:    CONSTITUTIONAL: No weakness, fevers or chills  EYES/ENT: No visual changes;  No vertigo or throat pain   NECK: No pain or stiffness  RESPIRATORY: No cough, wheezing, hemoptysis; No shortness of breath  CARDIOVASCULAR: No chest pain or palpitations  GASTROINTESTINAL: No abdominal or epigastric pain. No nausea, vomiting, or hematemesis; No diarrhea or constipation. No melena or hematochezia.  GENITOURINARY: No dysuria, frequency or hematuria  NEUROLOGICAL: No numbness or weakness  SKIN: No itching, rashes      ICU Vital Signs Last 24 Hrs  T(C): 36.9 (03 Dec 2023 19:00), Max: 36.9 (03 Dec 2023 03:00)  T(F): 98.5 (03 Dec 2023 19:00), Max: 98.5 (03 Dec 2023 19:00)  HR: 80 (03 Dec 2023 20:00) (69 - 87)  BP: 113/55 (03 Dec 2023 19:00) (113/55 - 113/55)  BP(mean): --  ABP: --  ABP(mean): --  RR: 21 (03 Dec 2023 20:00) (15 - 27)  SpO2: 98% (03 Dec 2023 20:00) (93% - 99%)    O2 Parameters below as of 03 Dec 2023 19:00  Patient On (Oxygen Delivery Method): room air            CVP(mm Hg): --  CO: --  CI: --  PA: --  PA(mean): --  PA(direct): --  PCWP: --  LA: --  RA: --  SVR: --  SVRI: --  PVR: --  PVRI: --  I&O's Summary    02 Dec 2023 07:01  -  03 Dec 2023 07:00  --------------------------------------------------------  IN: 976 mL / OUT: 1300 mL / NET: -324 mL    03 Dec 2023 07:01  -  03 Dec 2023 20:38  --------------------------------------------------------  IN: 1062 mL / OUT: 1120 mL / NET: -58 mL        CAPILLARY BLOOD GLUCOSE    CAPILLARY BLOOD GLUCOSE      POCT Blood Glucose.: 214 mg/dL (03 Dec 2023 17:34)      PHYSICAL EXAM:  GENERAL: No acute distress, well-developed  HEAD:  Atraumatic, Normocephalic  EYES: EOMI, PERRLA, conjunctiva and sclera clear  NECK: Supple, no lymphadenopathy, no JVD  CHEST/LUNG: CTAB; No wheezes, rales, or rhonchi  HEART: Regular rate and rhythm. Normal S1/S2. No murmurs, rubs, or gallops  ABDOMEN: Soft, non-tender, non-distended; normal bowel sounds, no organomegaly  EXTREMITIES:  2+ peripheral pulses b/l, No clubbing, cyanosis, or edema  NEUROLOGY: A&O x 3, no focal deficits  SKIN: No rashes or lesions    ============================I/O===========================   I&O's Detail    02 Dec 2023 07:01  -  03 Dec 2023 07:00  --------------------------------------------------------  IN:    Heparin: 336 mL    IV PiggyBack: 100 mL    Oral Fluid: 540 mL  Total IN: 976 mL    OUT:    Voided (mL): 1300 mL  Total OUT: 1300 mL    Total NET: -324 mL      03 Dec 2023 07:01  -  03 Dec 2023 20:38  --------------------------------------------------------  IN:    Heparin: 182 mL    Oral Fluid: 880 mL  Total IN: 1062 mL    OUT:    Voided (mL): 1120 mL  Total OUT: 1120 mL    Total NET: -58 mL        ============================ LABS =========================                        10.8   7.21  )-----------( 126      ( 03 Dec 2023 00:36 )             32.3     12-    130<L>  |  101  |  28<H>  ----------------------------<  121<H>  4.0   |  18<L>  |  1.21    Ca    9.5      03 Dec 2023 00:36  Phos  2.9     12  Mg     1.7     12    TPro  6.4  /  Alb  3.4  /  TBili  0.3  /  DBili  x   /  AST  44<H>  /  ALT  82<H>  /  AlkPhos  60  12                LIVER FUNCTIONS - ( 03 Dec 2023 00:36 )  Alb: 3.4 g/dL / Pro: 6.4 g/dL / ALK PHOS: 60 U/L / ALT: 82 U/L / AST: 44 U/L / GGT: x           PT/INR - ( 03 Dec 2023 00:36 )   PT: 12.3 sec;   INR: 1.18 ratio         PTT - ( 03 Dec 2023 00:36 )  PTT:70.5 sec    Lactate, Blood: 0.5 mmol/L (23 @ 01:25)    Urinalysis Basic - ( 03 Dec 2023 00:36 )    Color: x / Appearance: x / SG: x / pH: x  Gluc: 121 mg/dL / Ketone: x  / Bili: x / Urobili: x   Blood: x / Protein: x / Nitrite: x   Leuk Esterase: x / RBC: x / WBC x   Sq Epi: x / Non Sq Epi: x / Bacteria: x  ======================Micro/Rad/Cardio=================  Telemtry: Reviewed   EKG: Reviewed  CXR: Reviewed  Culture: Reviewed   Echo:   Cath: Cardiac Cath Lab - Adult:   [facility]  Department of Cardiology  [address] [place] [address]  [phone]  Cath Lab Report -- Comprehensive Report  Patient: LD VALENCIA  Study date: 2017  Account number: [id number]  MR number: [id number]  : 1965  Gender: Male  Race: W  Case Physician(s):  Jairon Holguin M.D.  Referring Physician:  Luc Lynn M.D.  INDICATIONS: Unstable angina - CCS4.  HISTORY: The patient has a history of coronary artery disease. The patient  hashypertension and medication-treated dyslipidemia.  PROCEDURE:  --  Left heart catheterization with ventriculography.  --  Left coronary angiography.  --  Right coronary angiography.  TECHNIQUE: The risks and alternatives of the procedures and conscious  sedation were explained to the patient and informed consent was obtained.  Cardiac catheterization performed electively.  Local anesthetic given. Right radial artery access. A 6FR PRELUDE KIT was  inserted in the vessel. Left heart catheterization. Ventriculography was  performed. A 5FR FR4.0 EXPO catheter was utilized. Left coronary artery  angiography. The vessel was injected utilizing a 5FR FL3.5 EXPO catheter.  Right coronary artery angiography. The vessel was injected utilizing a 5FR  FR4.0 EXPO catheter. RADIATION EXPOSURE: 1.1 min.  CONTRAST GIVEN: Omnipaque 55 ml.  MEDICATIONS GIVEN: Midazolam, 1 mg, IV. Fentanyl, 25 mcg, IV. Verapamil  (Isoptin, Calan, Covera), 2.5 mg, IA. Heparin, 3000 units, IA.  VENTRICLES: Global left ventricular function was moderately depressed. EF  estimated was 40 %.  CORONARY VESSELS: The coronary circulation is right dominant.  LM:   --  LM: Normal.  LAD:   --  Proximal LAD: There was a 50 % stenosis.  CX:   --  Circumflex: Normal.  RCA:   --  Mid RCA: There was a 40 % stenosis.  --  Distal RCA: There was a 50 % stenosis.  COMPLICATIONS: There were no complications.  DIAGNOSTIC RECOMMENDATIONS: The patient should continue with the present  medications.  Prepared and signed by  Jairon Holguin M.D.  Signed 2017 12:20:13  HEMODYNAMIC TABLES  Pressures:  Baseline/ Room Air  Pressures:  - HR: 78  Pressures:  - Rhythm:  Pressures:  -- Aortic Pressure (S/D/M): --/--/99  Pressures:  -- Left Ventricle (s/edp): 157/39/--  Outputs:  Baseline/ Room Air  Outputs:  -- CALCULATIONS: Age in years: 51.41  Outputs:  -- CALCULATIONS: Body Surface Area: 2.05  Outputs:  -- CALCULATIONS: Height in cm: 175.00  Outputs:  -- CALCULATIONS: Sex: Male  Outputs:  -- CALCULATIONS: Weight in k.40 (17 @ 21:55)    ======================================================  PAST MEDICAL & SURGICAL HISTORY:  HTN (hypertension)      CAD (coronary artery disease)  ; stent      Intracranial hemorrhage        Respiratory arrest        Myocardial infarction, unspecified MI type, unspecified artery      History of coronary artery stent placement  ====================ASSESSMENT ==============  58 male with HTN, CAD (s/p PCI ), HFrEF, CVA , and T2DM presenting with chest pressure and unknown tachycardia that was shocked x1, C  found to have in-stent restenosis of pLAD and  of RCA with elevated RA and PA pressures and severely decreased. Admitted to CICU for management of cardiogenic shock and ADHF requiring IABP  -, with hospital course c/b vfib arrest requiring reinsertion of IABP. Currently listed for transplant status 2.    Plan:  ====================== NEUROLOGY=====================  Anxiety  - No Seroquel/antipsychotics since vfib arrest and prolonged QTC  - Psych Eval, recommended SSRI, but pt. refused   - Psych recommending atarax PRN  - Continue to monitor mental status    PT/Conditioning  - Continue band exercised while on bedrest s/t IABP    ==================== RESPIRATORY======================  Acute Hypoxemic Respiratory Failure  - s/p x2 intubations for cardiogenic pulm edema and the in setting of cardiac arrest, resolved - extubated 11/10  - Currently on room air with spO2 mid 90s  - Continue incentive spirometry and monitoring of sp02    Asthma  - c/w albuterol, symbicort and spiriva  - On trelegy at home  - Continue to monitor SpO2 with goal >94%    ====================CARDIOVASCULAR==================  Vfib arrest i/s/o ischemia  - Lido gtt off   - PO Amio load - total of 5g per EP complete , continue PO amio  - Keep K > 4, Mag > 2.2     Cardiogenic shock requiring IABP (- , -)  - Likely 2/2 NSTEMI and ADHF  -  LHC: pLAD 100 % in-stent restenosis & mRCA, 100 %. PCWP 30. IABP placed.  -  TTE: LV dilated. EF 32 %. Regional WMAs present, mod (grade 2) LV diastolic dysfunction  -  TTE: EF 22% and + LV thrombus  -  s/p 500 cc bolus  - IABP swapped  to RFA, continue 1:1 support  - Off Milrinone gtt @ 7:30 am 11/11  - Currently on hydralazine 100 TID and ISD 40 TID for AL reduction  - [place] d/c'd due to elevated K levels  - c/w coreg 25 BID for GDMT  - Listed OHT status 2 , f/u HF recs  - HIT and HAIDER sent (12/3) as per HF     NSTEMI iso stent re-occlusion of pLAD and 100%  of RCA  - EKG on admission w/ LBBB  - DAPT: c/w ASA, Brilinta d/c'd per transplant w/u  - c/w lipitor 80  - cMR deferred given necessity of IABP  - CT sx not recommending CABG, undergoing AT eval    LV thrombus  - c/w heparin gtt    ===================== RENAL =========================  Non-oliguric ARIC   - Baseline Cr: 1-  - s/p 500 LR   - Renal US: no evidence of renal artery stenosis  - Trend BMP, lytes daily, replace as needed  - Continue Strict I/Os, avoid nephrotoxins    Mild Hyponatremia/Hyperkalemia iso ARIC and or new CKD baseline  - Continue fluid restriction   - Urea powder d/c'd per renal  - Trend daily  - Continue monitoring urine output, lytes, SCr/ BUN  - Replete lytes prn with goal K >4 and Mg >2    =============== GASTROINTESTINAL===================  Constipation/ileus, resolved  - s/p multiple BMs, symptoms improving   - c/w diet    ===================ENDO====================  Type 2 DM  - A1c 8.3  - Continue lantus, premeal, low ISS  - continue FS    ===================HEMATOLOGIC/ONC ===================  - H/H & plts stable  - Monitor H/H and plts  - VTE PPX: heparin gtt    ==================INFECTIOUS DISEASE================  # Leukocytosis  - Repeat BCx pending  - Monitor off abx at this time  - Monitor and trend WBC and temperature curve     # Enterococcus faecalis bacteremia, resolved  - BCx + for enterococcus faecalis x2, Staph epi x1 (likely contaminant)- pan sensitive   - Urine cx  + enterococcus faecalis  - BCx  NGTD  - IABP site swapped to RFA   - s/p Vancomycin 1g q12h (-)  - CT A/P negative for infectious pathology    # COVID, resolved  - Off airborne precautions     # Pre-transplant ID w/u   - Trend ID recs for serologies   - Colonoscopy  - normal   - Chest CT  - improved LLL aeration  - s/p immunizations      Patient requires continuous monitoring with bedside rhythm monitoring, pulse ox monitoring, and intermittent blood gas analysis. Care plan discussed with ICU care team. Patient remained critical and at risk for life threatening decompensation.  Patient seen, examined and plan discussed with CCU team during rounds.     I have personally provided ____ minutes of critical care time excluding time spent on separate procedures, in addition to initial critical care time provided by the CICU Attending, Dr. Walter.    By signing my name below, I, Kalyn Sousa, attest that this documentation has been prepared under the direction and in the presence of Rita Hawthorne NP.   Electronically signed: Rosalba Booth, 23 @ 20:38    I, Rita Hawthorne , personally performed the services described in this documentation. all medical record entries made by the eileenibe were at my direction and in my presence. I have reviewed the chart and agree that the record reflects my personal performance and is accurate and complete  Electronically signed: Rita Hawthorne NP.        DEVORAH VALENCIA  MRN-[id number]  Patient is a 58y old  Male who presents with a chief complaint of cardiogenic shock (03 Dec 2023 06:44)    HPI:  pt seen and approx 1:30 pm  in [facility]    57yo M w/ hx HTN, CAD w/ 1 stent in , ICH () presenting with abn ekg. Patient presented to [facility] where he was found to have STEMI, recommended to get cath however patient did not want to get it there so it left and came here.  Patient initially had cough, congestion, fever, was placed on antibiotics on .  Started feeling nauseous and had a presyncopal event after which he presented to ED last night.  Had chest pain as well.  Chest pain is midsternal.  Not currently having chest pain.  Received 4 aspirin 30 min pta. (2023 15:11)      Hospital Course:    24 HOUR EVENTS:    REVIEW OF SYSTEMS:    CONSTITUTIONAL: No weakness, fevers or chills  EYES/ENT: No visual changes;  No vertigo or throat pain   NECK: No pain or stiffness  RESPIRATORY: No cough, wheezing, hemoptysis; No shortness of breath  CARDIOVASCULAR: No chest pain or palpitations  GASTROINTESTINAL: No abdominal or epigastric pain. No nausea, vomiting, or hematemesis; No diarrhea or constipation. No melena or hematochezia.  GENITOURINARY: No dysuria, frequency or hematuria  NEUROLOGICAL: No numbness or weakness  SKIN: No itching, rashes      ICU Vital Signs Last 24 Hrs  T(C): 36.9 (03 Dec 2023 19:00), Max: 36.9 (03 Dec 2023 03:00)  T(F): 98.5 (03 Dec 2023 19:00), Max: 98.5 (03 Dec 2023 19:00)  HR: 80 (03 Dec 2023 20:00) (69 - 87)  BP: 113/55 (03 Dec 2023 19:00) (113/55 - 113/55)  BP(mean): --  ABP: --  ABP(mean): --  RR: 21 (03 Dec 2023 20:00) (15 - 27)  SpO2: 98% (03 Dec 2023 20:00) (93% - 99%)    O2 Parameters below as of 03 Dec 2023 19:00  Patient On (Oxygen Delivery Method): room air            CVP(mm Hg): --  CO: --  CI: --  PA: --  PA(mean): --  PA(direct): --  PCWP: --  LA: --  RA: --  SVR: --  SVRI: --  PVR: --  PVRI: --  I&O's Summary    02 Dec 2023 07:01  -  03 Dec 2023 07:00  --------------------------------------------------------  IN: 976 mL / OUT: 1300 mL / NET: -324 mL    03 Dec 2023 07:01  -  03 Dec 2023 20:38  --------------------------------------------------------  IN: 1062 mL / OUT: 1120 mL / NET: -58 mL        CAPILLARY BLOOD GLUCOSE    CAPILLARY BLOOD GLUCOSE      POCT Blood Glucose.: 214 mg/dL (03 Dec 2023 17:34)      PHYSICAL EXAM:  GENERAL: No acute distress, well-developed  HEAD:  Atraumatic, Normocephalic  EYES: EOMI, PERRLA, conjunctiva and sclera clear  NECK: Supple, no lymphadenopathy, no JVD  CHEST/LUNG: CTAB; No wheezes, rales, or rhonchi  HEART: Regular rate and rhythm. Normal S1/S2. No murmurs, rubs, or gallops  ABDOMEN: Soft, non-tender, non-distended; normal bowel sounds, no organomegaly  EXTREMITIES:  2+ peripheral pulses b/l, No clubbing, cyanosis, or edema  NEUROLOGY: A&O x 3, no focal deficits  SKIN: No rashes or lesions    ============================I/O===========================   I&O's Detail    02 Dec 2023 07:01  -  03 Dec 2023 07:00  --------------------------------------------------------  IN:    Heparin: 336 mL    IV PiggyBack: 100 mL    Oral Fluid: 540 mL  Total IN: 976 mL    OUT:    Voided (mL): 1300 mL  Total OUT: 1300 mL    Total NET: -324 mL      03 Dec 2023 07:01  -  03 Dec 2023 20:38  --------------------------------------------------------  IN:    Heparin: 182 mL    Oral Fluid: 880 mL  Total IN: 1062 mL    OUT:    Voided (mL): 1120 mL  Total OUT: 1120 mL    Total NET: -58 mL        ============================ LABS =========================                        10.8   7.21  )-----------( 126      ( 03 Dec 2023 00:36 )             32.3     12-    130<L>  |  101  |  28<H>  ----------------------------<  121<H>  4.0   |  18<L>  |  1.21    Ca    9.5      03 Dec 2023 00:36  Phos  2.9     12  Mg     1.7     12    TPro  6.4  /  Alb  3.4  /  TBili  0.3  /  DBili  x   /  AST  44<H>  /  ALT  82<H>  /  AlkPhos  60  12                LIVER FUNCTIONS - ( 03 Dec 2023 00:36 )  Alb: 3.4 g/dL / Pro: 6.4 g/dL / ALK PHOS: 60 U/L / ALT: 82 U/L / AST: 44 U/L / GGT: x           PT/INR - ( 03 Dec 2023 00:36 )   PT: 12.3 sec;   INR: 1.18 ratio         PTT - ( 03 Dec 2023 00:36 )  PTT:70.5 sec    Lactate, Blood: 0.5 mmol/L (23 @ 01:25)    Urinalysis Basic - ( 03 Dec 2023 00:36 )    Color: x / Appearance: x / SG: x / pH: x  Gluc: 121 mg/dL / Ketone: x  / Bili: x / Urobili: x   Blood: x / Protein: x / Nitrite: x   Leuk Esterase: x / RBC: x / WBC x   Sq Epi: x / Non Sq Epi: x / Bacteria: x  ======================Micro/Rad/Cardio=================  Telemtry: Reviewed   EKG: Reviewed  CXR: Reviewed  Culture: Reviewed   Echo:   Cath: Cardiac Cath Lab - Adult:   [facility]  Department of Cardiology  [address] [place] [address]  [phone]  Cath Lab Report -- Comprehensive Report  Patient: LD VALENCIA  Study date: 2017  Account number: [id number]  MR number: [id number]  : 1965  Gender: Male  Race: W  Case Physician(s):  Jairon Holguin M.D.  Referring Physician:  Luc Lynn M.D.  INDICATIONS: Unstable angina - CCS4.  HISTORY: The patient has a history of coronary artery disease. The patient  hashypertension and medication-treated dyslipidemia.  PROCEDURE:  --  Left heart catheterization with ventriculography.  --  Left coronary angiography.  --  Right coronary angiography.  TECHNIQUE: The risks and alternatives of the procedures and conscious  sedation were explained to the patient and informed consent was obtained.  Cardiac catheterization performed electively.  Local anesthetic given. Right radial artery access. A 6FR PRELUDE KIT was  inserted in the vessel. Left heart catheterization. Ventriculography was  performed. A 5FR FR4.0 EXPO catheter was utilized. Left coronary artery  angiography. The vessel was injected utilizing a 5FR FL3.5 EXPO catheter.  Right coronary artery angiography. The vessel was injected utilizing a 5FR  FR4.0 EXPO catheter. RADIATION EXPOSURE: 1.1 min.  CONTRAST GIVEN: Omnipaque 55 ml.  MEDICATIONS GIVEN: Midazolam, 1 mg, IV. Fentanyl, 25 mcg, IV. Verapamil  (Isoptin, Calan, Covera), 2.5 mg, IA. Heparin, 3000 units, IA.  VENTRICLES: Global left ventricular function was moderately depressed. EF  estimated was 40 %.  CORONARY VESSELS: The coronary circulation is right dominant.  LM:   --  LM: Normal.  LAD:   --  Proximal LAD: There was a 50 % stenosis.  CX:   --  Circumflex: Normal.  RCA:   --  Mid RCA: There was a 40 % stenosis.  --  Distal RCA: There was a 50 % stenosis.  COMPLICATIONS: There were no complications.  DIAGNOSTIC RECOMMENDATIONS: The patient should continue with the present  medications.  Prepared and signed by  Jairon Holguin M.D.  Signed 2017 12:20:13  HEMODYNAMIC TABLES  Pressures:  Baseline/ Room Air  Pressures:  - HR: 78  Pressures:  - Rhythm:  Pressures:  -- Aortic Pressure (S/D/M): --/--/99  Pressures:  -- Left Ventricle (s/edp): 157/39/--  Outputs:  Baseline/ Room Air  Outputs:  -- CALCULATIONS[age]  Outputs:  -- CALCULATIONS: Body Surface Area: 2.05  Outputs:  -- CALCULATIONS: Height in cm: 175.00  Outputs:  -- CALCULATIONS: Sex: Male  Outputs:  -- CALCULATIONS: Weight in k.40 (17 @ 21:55)    ======================================================  PAST MEDICAL & SURGICAL HISTORY:  HTN (hypertension)      CAD (coronary artery disease)  ; stent      Intracranial hemorrhage        Respiratory arrest        Myocardial infarction, unspecified MI type, unspecified artery      History of coronary artery stent placement  ====================ASSESSMENT ==============  58 male with HTN, CAD (s/p PCI ), HFrEF, CVA , and T2DM presenting with chest pressure and unknown tachycardia that was shocked x1, C  found to have in-stent restenosis of pLAD and  of RCA with elevated RA and PA pressures and severely decreased. Admitted to CICU for management of cardiogenic shock and ADHF requiring IABP  -, with hospital course c/b vfib arrest requiring reinsertion of IABP. Currently listed for transplant status 2.    Plan:  ====================== NEUROLOGY=====================  Anxiety  - No Seroquel/antipsychotics since vfib arrest and prolonged QTC  - Psych Eval, recommended SSRI, but pt. refused   - Psych recommending atarax PRN  - Continue to monitor mental status    PT/Conditioning  - Continue band exercised while on bedrest s/t IABP    ==================== RESPIRATORY======================  Acute Hypoxemic Respiratory Failure  - s/p x2 intubations for cardiogenic pulm edema and the in setting of cardiac arrest, resolved - extubated 11/10  - Currently on room air with spO2 mid 90s  - Continue incentive spirometry and monitoring of sp02    Asthma  - c/w albuterol, symbicort and spiriva  - On trelegy at home  - Continue to monitor SpO2 with goal >94%    ====================CARDIOVASCULAR==================  Vfib arrest i/s/o ischemia  - Lido gtt off   - PO Amio load - total of 5g per EP complete , continue PO amio  - Keep K > 4, Mag > 2.2     Cardiogenic shock requiring IABP (- , -)  - Likely 2/2 NSTEMI and ADHF  -  LHC: pLAD 100 % in-stent restenosis & mRCA, 100 %. PCWP 30. IABP placed.  -  TTE: LV dilated. EF 32 %. Regional WMAs present, mod (grade 2) LV diastolic dysfunction  -  TTE: EF 22% and + LV thrombus  -  s/p 500 cc bolus  - IABP swapped  to RFA, continue 1:1 support  - Off Milrinone gtt @ 7:30 am 11/11  - Currently on hydralazine 100 TID and ISD 40 TID for AL reduction  - [place] d/c'd due to elevated K levels  - c/w coreg 25 BID for GDMT  - Listed OHT status 2 , f/u HF recs  - HIT and HAIDER sent (12/3) as per HF     NSTEMI iso stent re-occlusion of pLAD and 100%  of RCA  - EKG on admission w/ LBBB  - DAPT: c/w ASA, Brilinta d/c'd per transplant w/u  - c/w lipitor 80  - cMR deferred given necessity of IABP  - CT sx not recommending CABG, undergoing AT eval    LV thrombus  - c/w heparin gtt    ===================== RENAL =========================  Non-oliguric ARIC   - Baseline Cr: 1-  - s/p 500 LR   - Renal US: no evidence of renal artery stenosis  - Trend BMP, lytes daily, replace as needed  - Continue Strict I/Os, avoid nephrotoxins    Mild Hyponatremia/Hyperkalemia iso ARIC and or new CKD baseline  - Continue fluid restriction   - Urea powder d/c'd per renal  - Trend daily  - Continue monitoring urine output, lytes, SCr/ BUN  - Replete lytes prn with goal K >4 and Mg >2    =============== GASTROINTESTINAL===================  Constipation/ileus, resolved  - s/p multiple BMs, symptoms improving   - c/w diet    ===================ENDO====================  Type 2 DM  - A1c 8.3  - Continue lantus, premeal, low ISS  - continue FS    ===================HEMATOLOGIC/ONC ===================  - H/H & plts stable  - Monitor H/H and plts  - VTE PPX: heparin gtt    ==================INFECTIOUS DISEASE================  # Leukocytosis  - Repeat BCx pending  - Monitor off abx at this time  - Monitor and trend WBC and temperature curve     # Enterococcus faecalis bacteremia, resolved  - BCx + for enterococcus faecalis x2, Staph epi x1 (likely contaminant)- pan sensitive   - Urine cx  + enterococcus faecalis  - BCx  NGTD  - IABP site swapped to RFA   - s/p Vancomycin 1g q12h (-)  - CT A/P negative for infectious pathology    # COVID, resolved  - Off airborne precautions     # Pre-transplant ID w/u   - Trend ID recs for serologies   - Colonoscopy  - normal   - Chest CT  - improved LLL aeration  - s/p immunizations      Patient requires continuous monitoring with bedside rhythm monitoring, pulse ox monitoring, and intermittent blood gas analysis. Care plan discussed with ICU care team. Patient remained critical and at risk for life threatening decompensation.  Patient seen, examined and plan discussed with CCU team during rounds.     I have personally provided ____ minutes of critical care time excluding time spent on separate procedures, in addition to initial critical care time provided by the CICU Attending, Dr. Walter.    By signing my name below, I, Kalyn Sousa, attest that this documentation has been prepared under the direction and in the presence of Rita Hawthorne NP.   Electronically signed: Rosalba Booth, 23 @ 20:38    I, Rita Hawthorne , personally performed the services described in this documentation. all medical record entries made by the eileenibe were at my direction and in my presence. I have reviewed the chart and agree that the record reflects my personal performance and is accurate and complete  Electronically signed: Rita Hawthorne NP.        DEVORAH VALENCIA  [id number]  Patient is a 58y old  Male who presents with a chief complaint of cardiogenic shock (03 Dec 2023 06:44)    HPI: 58M w/ hx HTN, CAD w/ 1 stent in , ICH () presenting with abn ekg. Patient presented to [facility] where he was found to have STEMI, recommended to get cath however patient did not want to get it there so it left and came here.  Patient initially had cough, congestion, fever, was placed on antibiotics on .  Started feeling nauseous and had a presyncopal event after which he presented to ED last night.  Had chest pain as well.  Chest pain is midsternal.  Not currently having chest pain.  Received 4 aspirin 30 min pta. (2023 15:11)    REVIEW OF SYSTEMS:  CONSTITUTIONAL: No weakness, fevers or chills  EYES/ENT: No visual changes;  No vertigo or throat pain   NECK: No pain or stiffness  RESPIRATORY: No cough, wheezing, hemoptysis; No shortness of breath  CARDIOVASCULAR: No chest pain or palpitations  GASTROINTESTINAL: No abdominal or epigastric pain  No nausea, vomiting, or hematemesis; No diarrhea or constipation. No melena or hematochezia.  GENITOURINARY: No dysuria, frequency or hematuria  NEUROLOGICAL: No numbness or weakness  SKIN: No itching, rashes      ICU Vital Signs Last 24 Hrs  T(C): 36.9 (03 Dec 2023 19:00), Max: 36.9 (03 Dec 2023 03:00)  T(F): 98.5 (03 Dec 2023 19:00), Max: 98.5 (03 Dec 2023 19:00)  HR: 80 (03 Dec 2023 20:00) (69 - 87)  BP: 113/55 (03 Dec 2023 19:00) (113/55 - 113/55)  RR: 21 (03 Dec 2023 20:00) (15 - 27)  SpO2: 98% (03 Dec 2023 20:00) (93% - 99%)    O2 Parameters below as of 03 Dec 2023 19:00  Patient On (Oxygen Delivery Method): room air      I&O's Summary    02 Dec 2023 07:01  -  03 Dec 2023 07:00  --------------------------------------------------------  IN: 976 mL / OUT: 1300 mL / NET: -324 mL    03 Dec 2023 07:01  -  03 Dec 2023 20:38  --------------------------------------------------------  IN: 1062 mL / OUT: 1120 mL / NET: -58 mL      CAPILLARY BLOOD GLUCOSE  POCT Blood Glucose.: 214 mg/dL (03 Dec 2023 17:34)  ============================I/O===========================   I&O's Detail    02 Dec 2023 07:  -  03 Dec 2023 07:00  --------------------------------------------------------  IN:    Heparin: 336 mL    IV PiggyBack: 100 mL    Oral Fluid: 540 mL  Total IN: 976 mL    OUT:    Voided (mL): 1300 mL  Total OUT: 1300 mL    Total NET: -324 mL      03 Dec 2023 07:  -  03 Dec 2023 20:38  --------------------------------------------------------  IN:    Heparin: 182 mL    Oral Fluid: 880 mL  Total IN: 1062 mL    OUT:    Voided (mL): 1120 mL  Total OUT: 1120 mL    Total NET: -58 mL  ============================ LABS =========================                     10.8   7.21  )-----------( 126      ( 03 Dec 2023 00:36 )             32.3         130<L>  |  101  |  28<H>  ----------------------------<  121<H>  4.0   |  18<L>  |  1.21    Ca    9.5      03 Dec 2023 00:36  Phos  2.9       Mg     1.7         TPro  6.4  /  Alb  3.4  /  TBili  0.3  /  DBili  x   /  AST  44<H>  /  ALT  82<H>  /  AlkPhos  60      LIVER FUNCTIONS - ( 03 Dec 2023 00:36 )  Alb: 3.4 g/dL / Pro: 6.4 g/dL / ALK PHOS: 60 U/L / ALT: 82 U/L / AST: 44 U/L / GGT: x           PT/INR - ( 03 Dec 2023 00:36 )   PT: 12.3 sec;   INR: 1.18 ratio    PTT - ( 03 Dec 2023 00:36 )  PTT:70.5 sec    Lactate, Blood: 0.5 mmol/L (23 @ 01:25)    Urinalysis Basic - ( 03 Dec 2023 00:36 )    Color: x / Appearance: x / SG: x / pH: x  Gluc: 121 mg/dL / Ketone: x  / Bili: x / Urobili: x   Blood: x / Protein: x / Nitrite: x   Leuk Esterase: x / RBC: x / WBC x   Sq Epi: x / Non Sq Epi: x / Bacteria: x  ======================Micro/Rad/Cardio=================  Telemtry: Reviewed   EKG: Reviewed  CXR: Reviewed  Culture: Reviewed   Echo:   Cath: Cardiac Cath Lab - Adult:   [facility]  Department of Cardiology  [address] [place] [address] [phone]  Cath Lab Report -- Comprehensive Report  Patient: LD VALENCIA  Study date: 2017  Account number: [id number]  MR number: [id number]  : 1965  Gender: Male  Race: W  Case Physician(s):  Jairon Holguin M.D.  Referring Physician:  Luc Lynn M.D.  INDICATIONS: Unstable angina - CCS4.  HISTORY: The patient has a history of coronary artery disease. The patient  hashypertension and medication-treated dyslipidemia.  PROCEDURE:  --  Left heart catheterization with ventriculography.  --  Left coronary angiography.  --  Right coronary angiography.  TECHNIQUE: The risks and alternatives of the procedures and conscious  sedation were explained to the patient and informed consent was obtained.  Cardiac catheterization performed electively.  Local anesthetic given. Right radial artery access. A 6FR PRELUDE KIT was  inserted in the vessel. Left heart catheterization. Ventriculography was  performed. A 5FR FR4.0 EXPO catheter was utilized. Left coronary artery  angiography. The vessel was injected utilizing a 5FR FL3.5 EXPO catheter.  Right coronary artery angiography. The vessel was injected utilizing a 5FR  FR4.0 EXPO catheter. RADIATION EXPOSURE: 1.1 min.  CONTRAST GIVEN: Omnipaque 55 ml.  MEDICATIONS GIVEN: Midazolam, 1 mg, IV. Fentanyl, 25 mcg, IV. Verapamil  (Isoptin, Calan, Covera), 2.5 mg, IA. Heparin, 3000 units, IA.  VENTRICLES: Global left ventricular function was moderately depressed. EF  estimated was 40 %.  CORONARY VESSELS: The coronary circulation is right dominant.  LM:   --  LM: Normal.  LAD:   --  Proximal LAD: There was a 50 % stenosis.  CX:   --  Circumflex: Normal.  RCA:   --  Mid RCA: There was a 40 % stenosis.  --  Distal RCA: There was a 50 % stenosis.  COMPLICATIONS: There were no complications.  DIAGNOSTIC RECOMMENDATIONS: The patient should continue with the present  medications.  Prepared and signed by  Jairon Holguin M.D.  Signed 2017 12:20:13  HEMODYNAMIC TABLES  Pressures:  Baseline/ Room Air  Pressures:  - HR: 78  Pressures:  - Rhythm:  Pressures:  -- Aortic Pressure (S/D/M): --/--/99  Pressures:  -- Left Ventricle (s/edp): 157/39/--  Outputs:  Baseline/ Room Air  Outputs:  -- CALCULATIONS[age]  Outputs:  -- CALCULATIONS: Body Surface Area: 2.05  Outputs:  -- CALCULATIONS: Height in cm: 175.00  Outputs:  -- CALCULATIONS: Sex: Male  Outputs:  -- CALCULATIONS: Weight in k.40 (17 @ 21:55)  ======================================================  PAST MEDICAL & SURGICAL HISTORY:  HTN (hypertension)      CAD (coronary artery disease)  ; stent      Intracranial hemorrhage        Respiratory arrest        Myocardial infarction, unspecified MI type, unspecified artery      History of coronary artery stent placement    ====================ASSESSMENT ==============  58 male with HTN, CAD (s/p PCI ), HFrEF, CVA , and T2DM presenting with chest pressure and unknown tachycardia that was shocked x1, LHC  found to have in-stent restenosis of pLAD and  of RCA with elevated RA and PA pressures and severely decreased. Admitted to CICU for management of cardiogenic shock and ADHF requiring IABP  -, with hospital course c/b vfib arrest requiring reinsertion of IABP. Currently listed for transplant status 2.    Plan:  ====================== NEUROLOGY=====================  Anxiety  - No Seroquel/antipsychotics since vfib arrest and prolonged QTC  - Psych Eval, recommended SSRI, but pt. refused   - Psych recommending atarax PRN  - Continue to monitor mental status    PT/Conditioning  - Continue band exercised while on bedrest s/t IABP    ==================== RESPIRATORY======================  Acute Hypoxemic Respiratory Failure  - s/p x2 intubations for cardiogenic pulm edema and the in setting of cardiac arrest, resolved - extubated 11/10  - Currently on room air with spO2 mid 90s  - Continue incentive spirometry and monitoring of sp02    Asthma  - c/w albuterol, symbicort and spiriva  - On trelegy at home  - Continue to monitor SpO2 with goal >94%    ====================CARDIOVASCULAR==================  Vfib arrest i/s/o ischemia  - Lido gtt off   - PO Amio load - total of 5g per EP complete , continue PO amio  - Keep K > 4, Mag > 2.2     Cardiogenic shock requiring IABP (- , -)  - Likely 2/2 NSTEMI and ADHF  -  LHC: pLAD 100 % in-stent restenosis & mRCA, 100 %. PCWP 30. IABP placed.  -  TTE: LV dilated. EF 32 %. Regional WMAs present, mod (grade 2) LV diastolic dysfunction  -  TTE: EF 22% and + LV thrombus  -  s/p 500 cc bolus  - IABP swapped  to RFA, continue 1:1 support  - Off Milrinone gtt @ 7:30 am   - Currently on hydralazine 100 TID and ISD 40 TID for AL reduction  - James d/c'd due to elevated K levels  - c/w coreg 25 BID for GDMT  - Listed OHT status 2 , f/u HF recs  - HIT and HAIDER sent (12/3) as per HF     NSTEMI iso stent re-occlusion of pLAD and 100%  of RCA  - EKG on admission w/ LBBB  - DAPT: c/w ASA, Brilinta d/c'd per transplant w/u  - c/w lipitor 80  - cMR deferred given necessity of IABP  - CT sx not recommending CABG, undergoing AT eval    LV thrombus  - c/w heparin gtt    ===================== RENAL =========================  Non-oliguric ARIC   - Baseline Cr: 1-1.  - s/p 500 LR   - Renal US: no evidence of renal artery stenosis  - Trend BMP, lytes daily, replace as needed  - Continue Strict I/Os, avoid nephrotoxins    Mild Hyponatremia/Hyperkalemia iso ARIC and or new CKD baseline  - Continue fluid restriction   - Urea powder d/c'd per renal  - Trend daily  - Continue monitoring urine output, lytes, SCr/ BUN  - Replete lytes prn with goal K >4 and Mg >2    =============== GASTROINTESTINAL===================  Constipation/ileus, resolved  - s/p multiple BMs, symptoms improving   - c/w diet    ===================ENDO====================  Type 2 DM  - A1c 8.3  - Continue lantus, premeal, low ISS  - continue FS    ===================HEMATOLOGIC/ONC ===================  - H/H & plts stable  - Monitor H/H and plts  - VTE PPX: heparin gtt    ==================INFECTIOUS DISEASE================  # Leukocytosis  - Repeat BCx pending  - Monitor off abx at this time  - Monitor and trend WBC and temperature curve     # Enterococcus faecalis bacteremia, resolved  - BCx + for enterococcus faecalis x2, Staph epi x1 (likely contaminant)- pan sensitive   - Urine cx  + enterococcus faecalis  - BCx  NGTD  - IABP site swapped to RFA   - s/p Vancomycin 1g q12h (-)  - CT A/P negative for infectious pathology    # COVID, resolved  - Off airborne precautions     # Pre-transplant ID w/u   - Trend ID recs for serologies   - Colonoscopy  - normal   - Chest CT  - improved LLL aeration  - s/p immunizations      Patient requires continuous monitoring with bedside rhythm monitoring, pulse ox monitoring, and intermittent blood gas analysis. Care plan discussed with ICU care team. Patient remained critical and at risk for life threatening decompensation.  Patient seen, examined and plan discussed with CCU team during rounds.     I have personally provided 35 minutes of critical care time excluding time spent on separate procedures, in addition to initial critical care time provided by the CICU Attending, Dr. Walter.    By signing my name below, I, Kalyn Sousa, attest that this documentation has been prepared under the direction and in the presence of Rita Hawthorne NP.   Electronically signed: Rosalba Booth, 23 @ 20:38    I, Rita Hawthorne , personally performed the services described in this documentation. all medical record entries made by the scribe were at my direction and in my presence. I have reviewed the chart and agree that the record reflects my personal performance and is accurate and complete  Electronically signed: Rita Hawthorne NP   DEVORAH VALENCIA  MRN-[id number]  Patient is a 58y old  Male who presents with a chief complaint of cardiogenic shock (03 Dec 2023 06:44)    HPI: 58M w/ hx HTN, CAD w/ 1 stent in , ICH () presenting with abn ekg. Patient presented to [facility] where he was found to have STEMI, recommended to get cath however patient did not want to get it there so it left and came here.  Patient initially had cough, congestion, fever, was placed on antibiotics on .  Started feeling nauseous and had a presyncopal event after which he presented to ED last night.  Had chest pain as well.  Chest pain is midsternal.  Not currently having chest pain.  Received 4 aspirin 30 min pta. (2023 15:11)    REVIEW OF SYSTEMS:  CONSTITUTIONAL: No weakness, fevers or chills  EYES/ENT: No visual changes;  No vertigo or throat pain   NECK: No pain or stiffness  RESPIRATORY: No cough, wheezing, hemoptysis; No shortness of breath  CARDIOVASCULAR: No chest pain or palpitations  GASTROINTESTINAL: No abdominal or epigastric pain  No nausea, vomiting, or hematemesis; No diarrhea or constipation. No melena or hematochezia.  GENITOURINARY: No dysuria, frequency or hematuria  NEUROLOGICAL: No numbness or weakness  SKIN: No itching, rashes      ICU Vital Signs Last 24 Hrs  T(C): 36.9 (03 Dec 2023 19:00), Max: 36.9 (03 Dec 2023 03:00)  T(F): 98.5 (03 Dec 2023 19:00), Max: 98.5 (03 Dec 2023 19:00)  HR: 80 (03 Dec 2023 20:00) (69 - 87)  BP: 113/55 (03 Dec 2023 19:00) (113/55 - 113/55)  RR: 21 (03 Dec 2023 20:00) (15 - 27)  SpO2: 98% (03 Dec 2023 20:00) (93% - 99%)    O2 Parameters below as of 03 Dec 2023 19:00  Patient On (Oxygen Delivery Method): room air      I&O's Summary    02 Dec 2023 07:01  -  03 Dec 2023 07:00  --------------------------------------------------------  IN: 976 mL / OUT: 1300 mL / NET: -324 mL    03 Dec 2023 07:01  -  03 Dec 2023 20:38  --------------------------------------------------------  IN: 1062 mL / OUT: 1120 mL / NET: -58 mL      CAPILLARY BLOOD GLUCOSE  POCT Blood Glucose.: 214 mg/dL (03 Dec 2023 17:34)  ============================I/O===========================   I&O's Detail    02 Dec 2023 07:  -  03 Dec 2023 07:00  --------------------------------------------------------  IN:    Heparin: 336 mL    IV PiggyBack: 100 mL    Oral Fluid: 540 mL  Total IN: 976 mL    OUT:    Voided (mL): 1300 mL  Total OUT: 1300 mL    Total NET: -324 mL      03 Dec 2023 07:  -  03 Dec 2023 20:38  --------------------------------------------------------  IN:    Heparin: 182 mL    Oral Fluid: 880 mL  Total IN: 1062 mL    OUT:    Voided (mL): 1120 mL  Total OUT: 1120 mL    Total NET: -58 mL  ============================ LABS =========================                     10.8   7.21  )-----------( 126      ( 03 Dec 2023 00:36 )             32.3         130<L>  |  101  |  28<H>  ----------------------------<  121<H>  4.0   |  18<L>  |  1.21    Ca    9.5      03 Dec 2023 00:36  Phos  2.9       Mg     1.7         TPro  6.4  /  Alb  3.4  /  TBili  0.3  /  DBili  x   /  AST  44<H>  /  ALT  82<H>  /  AlkPhos  60      LIVER FUNCTIONS - ( 03 Dec 2023 00:36 )  Alb: 3.4 g/dL / Pro: 6.4 g/dL / ALK PHOS: 60 U/L / ALT: 82 U/L / AST: 44 U/L / GGT: x           PT/INR - ( 03 Dec 2023 00:36 )   PT: 12.3 sec;   INR: 1.18 ratio    PTT - ( 03 Dec 2023 00:36 )  PTT:70.5 sec    Lactate, Blood: 0.5 mmol/L (23 @ 01:25)    Urinalysis Basic - ( 03 Dec 2023 00:36 )    Color: x / Appearance: x / SG: x / pH: x  Gluc: 121 mg/dL / Ketone: x  / Bili: x / Urobili: x   Blood: x / Protein: x / Nitrite: x   Leuk Esterase: x / RBC: x / WBC x   Sq Epi: x / Non Sq Epi: x / Bacteria: x  ======================Micro/Rad/Cardio=================  Telemtry: Reviewed   EKG: Reviewed  CXR: Reviewed  Culture: Reviewed   Echo:   Cath: Cardiac Cath Lab - Adult:   [facility]  Department of Cardiology  [address] [place] [address] [phone]  Cath Lab Report -- Comprehensive Report  Patient: LD VALENCIA  Study date: 2017  Account number: [id number]  MR number: [id number]  : 1965  Gender: Male  Race: W  Case Physician(s):  Jairon Holguin M.D.  Referring Physician:  Luc Lynn M.D.  INDICATIONS: Unstable angina - CCS4.  HISTORY: The patient has a history of coronary artery disease. The patient  hashypertension and medication-treated dyslipidemia.  PROCEDURE:  --  Left heart catheterization with ventriculography.  --  Left coronary angiography.  --  Right coronary angiography.  TECHNIQUE: The risks and alternatives of the procedures and conscious  sedation were explained to the patient and informed consent was obtained.  Cardiac catheterization performed electively.  Local anesthetic given. Right radial artery access. A 6FR PRELUDE KIT was  inserted in the vessel. Left heart catheterization. Ventriculography was  performed. A 5FR FR4.0 EXPO catheter was utilized. Left coronary artery  angiography. The vessel was injected utilizing a 5FR FL3.5 EXPO catheter.  Right coronary artery angiography. The vessel was injected utilizing a 5FR  FR4.0 EXPO catheter. RADIATION EXPOSURE: 1.1 min.  CONTRAST GIVEN: Omnipaque 55 ml.  MEDICATIONS GIVEN: Midazolam, 1 mg, IV. Fentanyl, 25 mcg, IV. Verapamil  (Isoptin, Calan, Covera), 2.5 mg, IA. Heparin, 3000 units, IA.  VENTRICLES: Global left ventricular function was moderately depressed. EF  estimated was 40 %.  CORONARY VESSELS: The coronary circulation is right dominant.  LM:   --  LM: Normal.  LAD:   --  Proximal LAD: There was a 50 % stenosis.  CX:   --  Circumflex: Normal.  RCA:   --  Mid RCA: There was a 40 % stenosis.  --  Distal RCA: There was a 50 % stenosis.  COMPLICATIONS: There were no complications.  DIAGNOSTIC RECOMMENDATIONS: The patient should continue with the present  medications.  Prepared and signed by  Jairon Holguin M.D.  Signed 2017 12:20:13  HEMODYNAMIC TABLES  Pressures:  Baseline/ Room Air  Pressures:  - HR: 78  Pressures:  - Rhythm:  Pressures:  -- Aortic Pressure (S/D/M): --/--/99  Pressures:  -- Left Ventricle (s/edp): 157/39/--  Outputs:  Baseline/ Room Air  Outputs:  -- CALCULATIONS: Age in years: 51.41  Outputs:  -- CALCULATIONS: Body Surface Area: 2.05  Outputs:  -- CALCULATIONS: Height in cm: 175.00  Outputs:  -- CALCULATIONS: Sex: Male  Outputs:  -- CALCULATIONS: Weight in k.40 (17 @ 21:55)  ======================================================  PAST MEDICAL & SURGICAL HISTORY:  HTN (hypertension)      CAD (coronary artery disease)  ; stent      Intracranial hemorrhage        Respiratory arrest        Myocardial infarction, unspecified MI type, unspecified artery      History of coronary artery stent placement    ====================ASSESSMENT ==============  58 male with HTN, CAD (s/p PCI ), HFrEF, CVA , and T2DM presenting with chest pressure and unknown tachycardia that was shocked x1, LHC  found to have in-stent restenosis of pLAD and  of RCA with elevated RA and PA pressures and severely decreased. Admitted to CICU for management of cardiogenic shock and ADHF requiring IABP  -, with hospital course c/b vfib arrest requiring reinsertion of IABP. Currently listed for transplant status 2.    Plan:  ====================== NEUROLOGY=====================  Anxiety  - No Seroquel/antipsychotics since vfib arrest and prolonged QTC  - Psych Eval, recommended SSRI, but pt. refused   - Psych recommending atarax PRN  - Continue to monitor mental status    PT/Conditioning  - Continue band exercised while on bedrest s/t IABP    ==================== RESPIRATORY======================  Acute Hypoxemic Respiratory Failure  - s/p x2 intubations for cardiogenic pulm edema and the in setting of cardiac arrest, resolved - extubated 11/10  - Currently on room air with spO2 mid 90s  - Continue incentive spirometry and monitoring of sp02    Asthma  - c/w albuterol, symbicort and spiriva  - On trelegy at home  - Continue to monitor SpO2 with goal >94%    ====================CARDIOVASCULAR==================  Vfib arrest i/s/o ischemia  - Lido gtt off   - PO Amio load - total of 5g per EP complete , continue PO amio  - Keep K > 4, Mag > 2.2     Cardiogenic shock requiring IABP (- , -)  - Likely 2/2 NSTEMI and ADHF  -  LHC: pLAD 100 % in-stent restenosis & mRCA, 100 %. PCWP 30. IABP placed.  -  TTE: LV dilated. EF 32 %. Regional WMAs present, mod (grade 2) LV diastolic dysfunction  -  TTE: EF 22% and + LV thrombus  -  s/p 500 cc bolus  - IABP swapped  to RFA, continue 1:1 support  - Off Milrinone gtt @ 7:30 am   - Currently on hydralazine 100 TID and ISD 40 TID for AL reduction  - James d/c'd due to elevated K levels  - c/w coreg 25 BID for GDMT  - Listed OHT status 2 , f/u HF recs  - HIT and HAIDER sent (12/3) as per HF     NSTEMI iso stent re-occlusion of pLAD and 100%  of RCA  - EKG on admission w/ LBBB  - DAPT: c/w ASA, Brilinta d/c'd per transplant w/u  - c/w lipitor 80  - cMR deferred given necessity of IABP  - CT sx not recommending CABG, undergoing AT eval    LV thrombus  - c/w heparin gtt    ===================== RENAL =========================  Non-oliguric ARIC   - Baseline Cr: 1-1.  - s/p 500 LR   - Renal US: no evidence of renal artery stenosis  - Trend BMP, lytes daily, replace as needed  - Continue Strict I/Os, avoid nephrotoxins    Mild Hyponatremia/Hyperkalemia iso ARIC and or new CKD baseline  - Continue fluid restriction   - Urea powder d/c'd per renal  - Trend daily  - Continue monitoring urine output, lytes, SCr/ BUN  - Replete lytes prn with goal K >4 and Mg >2    =============== GASTROINTESTINAL===================  Constipation/ileus, resolved  - s/p multiple BMs, symptoms improving   - c/w diet    ===================ENDO====================  Type 2 DM  - A1c 8.3  - Continue lantus, premeal, low ISS  - continue FS    ===================HEMATOLOGIC/ONC ===================  - H/H & plts stable  - Monitor H/H and plts  - VTE PPX: heparin gtt    ==================INFECTIOUS DISEASE================  # Leukocytosis  - Repeat BCx pending  - Monitor off abx at this time  - Monitor and trend WBC and temperature curve     # Enterococcus faecalis bacteremia, resolved  - BCx + for enterococcus faecalis x2, Staph epi x1 (likely contaminant)- pan sensitive   - Urine cx  + enterococcus faecalis  - BCx  NGTD  - IABP site swapped to RFA   - s/p Vancomycin 1g q12h (-)  - CT A/P negative for infectious pathology    # COVID, resolved  - Off airborne precautions     # Pre-transplant ID w/u   - Trend ID recs for serologies   - Colonoscopy  - normal   - Chest CT  - improved LLL aeration  - s/p immunizations      Patient requires continuous monitoring with bedside rhythm monitoring, pulse ox monitoring, and intermittent blood gas analysis. Care plan discussed with ICU care team. Patient remained critical and at risk for life threatening decompensation.  Patient seen, examined and plan discussed with CCU team during rounds.     I have personally provided 35 minutes of critical care time excluding time spent on separate procedures, in addition to initial critical care time provided by the CICU Attending, Dr. Walter.    By signing my name below, I, Kalyn Sousa, attest that this documentation has been prepared under the direction and in the presence of Rita Hawthorne NP.   Electronically signed: Rosalba Booth, 23 @ 20:38    I, Rita Hawthorne , personally performed the services described in this documentation. all medical record entries made by the scribe were at my direction and in my presence. I have reviewed the chart and agree that the record reflects my personal performance and is accurate and complete  Electronically signed: Rita Hawthorne NP

## 2023-12-03 NOTE — PROGRESS NOTE ADULT - SUBJECTIVE AND OBJECTIVE BOX
CICU DAY NOTE  Admission date: 11/1/23  Chief complaint/ Diagnosis: CS  HPI: 57yo M w/ hx HTN, CAD w/ 1 stent in 2009, ICH (2008) presenting with abn ekg. Patient presented to [facility] where he was found to have STEMI, recommended to get cath however patient did not want to get it there so it left and came here.  Patient initially had cough, congestion, fever, was placed on antibiotics on Sunday.  Started feeling nauseous and had a presyncopal event after which he presented to ED last night.  Had chest pain as well.  Chest pain is midsternal.  Not currently having chest pain.  Received 4 aspirin 30 min pta. (01 Nov 2023 15:11)    Interval history: Pt remains hemodynamically stable    REVIEW OF SYSTEMS  Denies CP, Palpitation, SOB, Dyspnea [ x ] All other systems negative    MEDICATIONS  (STANDING)  aMIOdarone    Tablet 200 milliGRAM(s) Oral daily  aspirin  chewable 81 milliGRAM(s) Oral daily  atorvastatin 80 milliGRAM(s) Oral at bedtime  budesonide  80 MICROgram(s)/formoterol 4.5 MICROgram(s) Inhaler 2 Puff(s) Inhalation two times a day  carvedilol 25 milliGRAM(s) Oral every 12 hours  chlorhexidine 2% Cloths 1 Application(s) Topical daily  heparin  Infusion 1300 Unit(s)/Hr (14 mL/Hr) IV Continuous <Continuous>  hydrALAZINE 100 milliGRAM(s) Oral three times a day  insulin glargine Injectable (LANTUS) 12 Unit(s) SubCutaneous at bedtime  insulin lispro (ADMELOG) corrective regimen sliding scale   SubCutaneous three times a day before meals  insulin lispro (ADMELOG) corrective regimen sliding scale   SubCutaneous at bedtime  insulin lispro Injectable (ADMELOG) 9 Unit(s) SubCutaneous three times a day before meals  isosorbide   dinitrate Tablet (ISORDIL) 40 milliGRAM(s) Oral three times a day  polyethylene glycol 3350 17 Gram(s) Oral two times a day  senna 2 Tablet(s) Oral at bedtime    MEDICATIONS  (PRN):  hydrOXYzine hydrochloride 25 milliGRAM(s) Oral two times a day PRN Anxiety    FAMILY HISTORY:  Family history of meningitis (Sibling)  Brother, death at age 49 from complications of infection (June 2015)  Family history of hypertension in mother    Allergy   penicillins (Unknown)    ICU Vital Signs Last 24 Hrs  T(C): 36.9 (Max: 36.9)  HR: 74  (68 - 85)  BP: 109/56 (109/56 - 109/56)  BP(mean): 77 (77 - 77)  Aug 80'S  RR: 16  (15 - 26)  SpO2: 93%  (93% - 99%) on room air     I&O's Summary  IN: 976 mL / OUT: 1300 mL / NET: -324 mL    PHYSICAL EXAM  Appearance: Normal, NAD  HEAD: Normocephalic  EYES: PERRLA, conjunctiva and sclera clear  NECK: Supple, No JVD  CHEST/LUNG: Clear to auscultation bilaterally; No wheezing  HEART: Normal S1, S2. No murmurs, rubs, or gallops  ABDOMEN: + Bowel sounds, Soft, NT, ND   EXTREMITIES:  2+ Peripheral Pulses, No clubbing, cyanosis, or edema  NEUROLOGY: non-focal, aaox3  SKIN: No rashes or lesions    Interpretation of Telemetry:                        10.8   7.21  )-----------( 126               32.3     130<L>  |  101  |  28<H>  ----------------------------<  121<H>  4.0   |  18<L>  |  1.21    Ca    9.5      Phos  2.9     Mg     1.7     (repleted)  TPro  6.4  /  Alb  3.4  /  TBili  0.3  /  DBili  x   /  AST  44<H>  /  ALT  82<H>  /  AlkPhos  60  12-03  F/S 123-250         CICU DAY NOTE  Admission date: 11/1/23  Chief complaint/ Diagnosis: CS  HPI: 59yo M w/ hx HTN, CAD w/ 1 stent in 2009, ICH (2008) presenting with abn ekg. Patient presented to [facility] where he was found to have STEMI, recommended to get cath however patient did not want to get it there so it left and came here.  Patient initially had cough, congestion, fever, was placed on antibiotics on Sunday.  Started feeling nauseous and had a presyncopal event after which he presented to ED last night.  Had chest pain as well.  Chest pain is midsternal.  Not currently having chest pain.  Received 4 aspirin 30 min pta. (01 Nov 2023 15:11)    Interval history: Pt remains hemodynamically stable    REVIEW OF SYSTEMS  Denies CP, Palpitation, SOB, Dyspnea [ x ] All other systems negative    MEDICATIONS  (STANDING)  aMIOdarone    Tablet 200 milliGRAM(s) Oral daily  aspirin  chewable 81 milliGRAM(s) Oral daily  atorvastatin 80 milliGRAM(s) Oral at bedtime  budesonide  80 MICROgram(s)/formoterol 4.5 MICROgram(s) Inhaler 2 Puff(s) Inhalation two times a day  carvedilol 25 milliGRAM(s) Oral every 12 hours  chlorhexidine 2% Cloths 1 Application(s) Topical daily  heparin  Infusion 1300 Unit(s)/Hr (14 mL/Hr) IV Continuous <Continuous>  hydrALAZINE 100 milliGRAM(s) Oral three times a day  insulin glargine Injectable (LANTUS) 12 Unit(s) SubCutaneous at bedtime  insulin lispro (ADMELOG) corrective regimen sliding scale   SubCutaneous three times a day before meals  insulin lispro (ADMELOG) corrective regimen sliding scale   SubCutaneous at bedtime  insulin lispro Injectable (ADMELOG) 9 Unit(s) SubCutaneous three times a day before meals  isosorbide   dinitrate Tablet (ISORDIL) 40 milliGRAM(s) Oral three times a day  polyethylene glycol 3350 17 Gram(s) Oral two times a day  senna 2 Tablet(s) Oral at bedtime    MEDICATIONS  (PRN):  hydrOXYzine hydrochloride 25 milliGRAM(s) Oral two times a day PRN Anxiety    FAMILY HISTORY:  Family history of meningitis (Sibling)  Brother, death at age 49 from complications of infection (June 2015)  Family history of hypertension in mother    Allergy   penicillins (Unknown)    ICU Vital Signs Last 24 Hrs  T(C): 36.9 (Max: 36.9)  HR: 74  (68 - 85)  BP: 109/56 (109/56 - 109/56)  BP(mean): 77 (77 - 77)  Aug 80'S  RR: 16  (15 - 26)  SpO2: 93%  (93% - 99%) on room air     I&O's Summary  IN: 976 mL / OUT: 1300 mL / NET: -324 mL    PHYSICAL EXAM  Appearance: Normal, NAD  HEAD: Normocephalic  EYES: PERRLA, conjunctiva and sclera clear  NECK: Supple, No JVD  CHEST/LUNG: Clear to auscultation bilaterally; No wheezing  HEART: Normal S1, S2. No murmurs, rubs, or gallops  ABDOMEN: + Bowel sounds, Soft, NT, ND   EXTREMITIES:  2+ Peripheral Pulses, No clubbing, cyanosis, or edema  NEUROLOGY: non-focal, aaox3  SKIN: No rashes or lesions    Interpretation of Telemetry:                        10.8   7.21  )-----------( 126               32.3     130<L>  |  101  |  28<H>  ----------------------------<  121<H>  4.0   |  18<L>  |  1.21    Ca    9.5      Phos  2.9     Mg     1.7     (repleted)  TPro  6.4  /  Alb  3.4  /  TBili  0.3  /  DBili  x   /  AST  44<H>  /  ALT  82<H>  /  AlkPhos  60  12-03  F/S 123-250         CICU DAY NOTE  Admission date: 11/1/23  Chief complaint/ Diagnosis: CS  HPI: 59yo M w/ hx HTN, CAD w/ 1 stent in 2009, ICH (2008) presenting with abn ekg. Patient presented to [facility] where he was found to have STEMI, recommended to get cath however patient did not want to get it there so it left and came here.  Patient initially had cough, congestion, fever, was placed on antibiotics on Sunday.  Started feeling nauseous and had a presyncopal event after which he presented to ED last night.  Had chest pain as well.  Chest pain is midsternal.  Not currently having chest pain.  Received 4 aspirin 30 min pta. (01 Nov 2023 15:11)    Interval history: Pt remains hemodynamically stable    REVIEW OF SYSTEMS  Denies CP, Palpitation, SOB, Dyspnea [ x ] All other systems negative    MEDICATIONS  (STANDING)  aMIOdarone    Tablet 200 milliGRAM(s) Oral daily  aspirin  chewable 81 milliGRAM(s) Oral daily  atorvastatin 80 milliGRAM(s) Oral at bedtime  budesonide  80 MICROgram(s)/formoterol 4.5 MICROgram(s) Inhaler 2 Puff(s) Inhalation two times a day  carvedilol 25 milliGRAM(s) Oral every 12 hours  chlorhexidine 2% Cloths 1 Application(s) Topical daily  heparin  Infusion 1300 Unit(s)/Hr (14 mL/Hr) IV Continuous <Continuous>  hydrALAZINE 100 milliGRAM(s) Oral three times a day  insulin glargine Injectable (LANTUS) 12 Unit(s) SubCutaneous at bedtime  insulin lispro (ADMELOG) corrective regimen sliding scale   SubCutaneous three times a day before meals  insulin lispro (ADMELOG) corrective regimen sliding scale   SubCutaneous at bedtime  insulin lispro Injectable (ADMELOG) 9 Unit(s) SubCutaneous three times a day before meals  isosorbide   dinitrate Tablet (ISORDIL) 40 milliGRAM(s) Oral three times a day  polyethylene glycol 3350 17 Gram(s) Oral two times a day  senna 2 Tablet(s) Oral at bedtime    MEDICATIONS  (PRN):  hydrOXYzine hydrochloride 25 milliGRAM(s) Oral two times a day PRN Anxiety    FAMILY HISTORY:  Family history of meningitis (Sibling)  Brother, death at age 49 from complications of infection (June 2015)  Family history of hypertension in mother    Allergy   penicillins (Unknown)    ICU Vital Signs Last 24 Hrs  T(C): 36.9 (Max: 36.9)  HR: 74  (68 - 85)  BP: 109/56 (109/56 - 109/56)  BP(mean): 77 (77 - 77)  Aug 80'S  RR: 16  (15 - 26)  SpO2: 93%  (93% - 99%) on room air     I&O's Summary  IN: 976 mL / OUT: 1300 mL / NET: -324 mL    PHYSICAL EXAM  Appearance: Normal, NAD  HEAD: Normocephalic  EYES: PERRLA, conjunctiva and sclera clear  NECK: Supple, No JVD  CHEST/LUNG: Clear to auscultation bilaterally; No wheezing  HEART: Normal S1, S2. No murmurs, rubs, or gallops  ABDOMEN: + Bowel sounds, Soft, NT, ND   EXTREMITIES:  2+ Peripheral Pulses, No clubbing, cyanosis, or edema  NEUROLOGY: non-focal, aaox3  SKIN: No rashes or lesions    Interpretation of Telemetry:                        10.8   7.21  )-----------( 126               32.3     130<L>  |  101  |  28<H>  ----------------------------<  121<H>  4.0   |  18<L>  |  1.21    Ca    9.5      Phos  2.9     Mg     1.7     (repleted)  TPro  6.4  /  Alb  3.4  /  TBili  0.3  /  DBili  x   /  AST  44<H>  /  ALT  82<H>  /  AlkPhos  60  12-03  F/S 123-250         CICU DAY NOTE  Admission date: 11/1/23  Chief complaint/ Diagnosis: CS  HPI: 59yo M w/ hx HTN, CAD w/ 1 stent in 2009, ICH (2008) presenting with abn ekg. Patient presented to [facility] where he was found to have STEMI, recommended to get cath however patient did not want to get it there so it left and came here.  Patient initially had cough, congestion, fever, was placed on antibiotics on Sunday.  Started feeling nauseous and had a presyncopal event after which he presented to ED last night.  Had chest pain as well.  Chest pain is midsternal.  Not currently having chest pain.  Received 4 aspirin 30 min pta. (01 Nov 2023 15:11)    Interval history: Pt remains hemodynamically stable    REVIEW OF SYSTEMS  Denies CP, Palpitation, SOB, Dyspnea [ x ] All other systems negative    MEDICATIONS  (STANDING)  aMIOdarone    Tablet 200 milliGRAM(s) Oral daily  aspirin  chewable 81 milliGRAM(s) Oral daily  atorvastatin 80 milliGRAM(s) Oral at bedtime  budesonide  80 MICROgram(s)/formoterol 4.5 MICROgram(s) Inhaler 2 Puff(s) Inhalation two times a day  carvedilol 25 milliGRAM(s) Oral every 12 hours  chlorhexidine 2% Cloths 1 Application(s) Topical daily  heparin  Infusion 1300 Unit(s)/Hr (14 mL/Hr) IV Continuous <Continuous>  hydrALAZINE 100 milliGRAM(s) Oral three times a day  insulin glargine Injectable (LANTUS) 12 Unit(s) SubCutaneous at bedtime  insulin lispro (ADMELOG) corrective regimen sliding scale   SubCutaneous three times a day before meals  insulin lispro (ADMELOG) corrective regimen sliding scale   SubCutaneous at bedtime  insulin lispro Injectable (ADMELOG) 9 Unit(s) SubCutaneous three times a day before meals  isosorbide   dinitrate Tablet (ISORDIL) 40 milliGRAM(s) Oral three times a day  polyethylene glycol 3350 17 Gram(s) Oral two times a day  senna 2 Tablet(s) Oral at bedtime    MEDICATIONS  (PRN):  hydrOXYzine hydrochloride 25 milliGRAM(s) Oral two times a day PRN Anxiety    FAMILY HISTORY:  Family history of meningitis (Sibling)  Brother, death at age 49 from complications of infection (June 2015)  Family history of hypertension in mother    Allergy   penicillins (Unknown)    ICU Vital Signs Last 24 Hrs  T(C): 36.9 (Max: 36.9)  HR: 74  (68 - 85)  BP: 109/56 (109/56 - 109/56)  BP(mean): 77 (77 - 77)  Aug 80'S  RR: 16  (15 - 26)  SpO2: 93%  (93% - 99%) on room air     I&O's Summary  IN: 976 mL / OUT: 1300 mL / NET: -324 mL    PHYSICAL EXAM  Appearance: Normal, NAD  HEAD: Normocephalic  EYES: PERRLA, conjunctiva and sclera clear  NECK: Supple, No JVD  CHEST/LUNG: Clear to auscultation bilaterally; No wheezing  HEART: Normal S1, S2. No murmurs, rubs, or gallops  ABDOMEN: + Bowel sounds, Soft, NT, ND   EXTREMITIES:  2+ Peripheral Pulses, No clubbing, cyanosis, or edema  NEUROLOGY: non-focal, aaox3  SKIN: No rashes or lesions    Interpretation of Telemetry:                        10.8   7.21  )-----------( 126               32.3     130<L>  |  101  |  28<H>  ----------------------------<  121<H>  4.0   |  18<L>  |  1.21    Ca    9.5      Phos  2.9     Mg     1.7     (repleted)  TPro  6.4  /  Alb  3.4  /  TBili  0.3  /  DBili  x   /  AST  44<H>  /  ALT  82<H>  /  AlkPhos  60  12-03  F/S 123-250         CICU DAY NOTE  Admission date: 11/1/23  Chief complaint/ Diagnosis: CS  HPI: 59yo M w/ hx HTN, CAD w/ 1 stent in 2009, ICH (2008) presenting with abn ekg. Patient presented to [facility] where he was found to have STEMI, recommended to get cath however patient did not want to get it there so it left and came here.  Patient initially had cough, congestion, fever, was placed on antibiotics on Sunday.  Started feeling nauseous and had a presyncopal event after which he presented to ED last night.  Had chest pain as well.  Chest pain is midsternal.  Not currently having chest pain.  Received 4 aspirin 30 min pta. (01 Nov 2023 15:11)    Interval history: Pt remains hemodynamically stable    REVIEW OF SYSTEMS  Denies CP, Palpitation, SOB, Dyspnea [ x ] All other systems negative    MEDICATIONS  (STANDING)  aMIOdarone    Tablet 200 milliGRAM(s) Oral daily  aspirin  chewable 81 milliGRAM(s) Oral daily  atorvastatin 80 milliGRAM(s) Oral at bedtime  budesonide  80 MICROgram(s)/formoterol 4.5 MICROgram(s) Inhaler 2 Puff(s) Inhalation two times a day  carvedilol 25 milliGRAM(s) Oral every 12 hours  chlorhexidine 2% Cloths 1 Application(s) Topical daily  heparin  Infusion 1300 Unit(s)/Hr (14 mL/Hr) IV Continuous <Continuous>  hydrALAZINE 100 milliGRAM(s) Oral three times a day  insulin glargine Injectable (LANTUS) 12 Unit(s) SubCutaneous at bedtime  insulin lispro (ADMELOG) corrective regimen sliding scale   SubCutaneous three times a day before meals  insulin lispro (ADMELOG) corrective regimen sliding scale   SubCutaneous at bedtime  insulin lispro Injectable (ADMELOG) 9 Unit(s) SubCutaneous three times a day before meals  isosorbide   dinitrate Tablet (ISORDIL) 40 milliGRAM(s) Oral three times a day  polyethylene glycol 3350 17 Gram(s) Oral two times a day  senna 2 Tablet(s) Oral at bedtime    MEDICATIONS  (PRN):  hydrOXYzine hydrochloride 25 milliGRAM(s) Oral two times a day PRN Anxiety    FAMILY HISTORY:  Family history of meningitis (Sibling)  Brother, death at age 49 from complications of infection (June 2015)  Family history of hypertension in mother    Allergy   penicillins (Unknown)    ICU Vital Signs Last 24 Hrs  T(C): 36.9 (Max: 36.9)  HR: 74  (68 - 85)  BP: 109/56 (109/56 - 109/56)  BP(mean): 77 (77 - 77)  Aug 80'S  RR: 16  (15 - 26)  SpO2: 93%  (93% - 99%) on room air     I&O's Summary  IN: 976 mL / OUT: 1300 mL / NET: -324 mL    PHYSICAL EXAM  Appearance: Normal, NAD  HEAD: Normocephalic  EYES: PERRLA, conjunctiva and sclera clear  NECK: Supple, No JVD  CHEST/LUNG: Clear to auscultation bilaterally; No wheezing  HEART: Normal S1, S2. No murmurs, rubs, or gallops  ABDOMEN: + Bowel sounds, Soft, NT, ND   EXTREMITIES:  2+ Peripheral Pulses, No clubbing, cyanosis, or edema  NEUROLOGY: non-focal, aaox3  SKIN: No rashes or lesions    Interpretation of Telemetry:                        10.8   7.21  )-----------( 126               32.3     130<L>  |  101  |  28<H>  ----------------------------<  121<H>  4.0   |  18<L>  |  1.21    Ca    9.5      Phos  2.9     Mg     1.7     (repleted)  TPro  6.4  /  Alb  3.4  /  TBili  0.3  /  DBili  x   /  AST  44<H>  /  ALT  82<H>  /  AlkPhos  60  12-03  F/S 123-250         CICU DAY NOTE  Admission date: 11/1/23  Chief complaint/ Diagnosis: CS  HPI: 59yo M w/ hx HTN, CAD w/ 1 stent in 2009, ICH (2008) presenting with abn ekg. Patient presented to [facility] where he was found to have STEMI, recommended to get cath however patient did not want to get it there so it left and came here.  Patient initially had cough, congestion, fever, was placed on antibiotics on Sunday.  Started feeling nauseous and had a presyncopal event after which he presented to ED last night.  Had chest pain as well.  Chest pain is midsternal.  Not currently having chest pain.  Received 4 aspirin 30 min pta. (01 Nov 2023 15:11)    Interval history: Pt remains hemodynamically stable    REVIEW OF SYSTEMS  Denies CP, Palpitation, SOB, Dyspnea [ x ] All other systems negative    MEDICATIONS  (STANDING)  aMIOdarone    Tablet 200 milliGRAM(s) Oral daily  aspirin  chewable 81 milliGRAM(s) Oral daily  atorvastatin 80 milliGRAM(s) Oral at bedtime  budesonide  80 MICROgram(s)/formoterol 4.5 MICROgram(s) Inhaler 2 Puff(s) Inhalation two times a day  carvedilol 25 milliGRAM(s) Oral every 12 hours  chlorhexidine 2% Cloths 1 Application(s) Topical daily  heparin  Infusion 1300 Unit(s)/Hr (14 mL/Hr) IV Continuous <Continuous>  hydrALAZINE 100 milliGRAM(s) Oral three times a day  insulin glargine Injectable (LANTUS) 12 Unit(s) SubCutaneous at bedtime  insulin lispro (ADMELOG) corrective regimen sliding scale   SubCutaneous three times a day before meals  insulin lispro (ADMELOG) corrective regimen sliding scale   SubCutaneous at bedtime  insulin lispro Injectable (ADMELOG) 9 Unit(s) SubCutaneous three times a day before meals  isosorbide   dinitrate Tablet (ISORDIL) 40 milliGRAM(s) Oral three times a day  polyethylene glycol 3350 17 Gram(s) Oral two times a day  senna 2 Tablet(s) Oral at bedtime    MEDICATIONS  (PRN):  hydrOXYzine hydrochloride 25 milliGRAM(s) Oral two times a day PRN Anxiety    FAMILY HISTORY:  Family history of meningitis (Sibling)  Brother, death at age 49 from complications of infection (June 2015)  Family history of hypertension in mother    Allergy   penicillins (Unknown)    ICU Vital Signs Last 24 Hrs  T(C): 36.9 (Max: 36.9)  HR: 74  (68 - 85)  BP: 109/56 (109/56 - 109/56)  BP(mean): 77 (77 - 77)  Aug 80'S  RR: 16  (15 - 26)  SpO2: 93%  (93% - 99%) on room air     I&O's Summary  IN: 976 mL / OUT: 1300 mL / NET: -324 mL    PHYSICAL EXAM  Appearance: Normal, NAD  HEAD: Normocephalic  EYES: PERRLA, conjunctiva and sclera clear  NECK: Supple, No JVD  CHEST/LUNG: Clear to auscultation bilaterally; No wheezing  HEART: Normal S1, S2. No murmurs, rubs, or gallops  ABDOMEN: + Bowel sounds, Soft, NT, ND   EXTREMITIES:  2+ Peripheral Pulses, No clubbing, cyanosis, or edema  NEUROLOGY: non-focal, aaox3  SKIN: No rashes or lesions    Interpretation of Telemetry:                        10.8   7.21  )-----------( 126               32.3     130<L>  |  101  |  28<H>  ----------------------------<  121<H>  4.0   |  18<L>  |  1.21    Ca    9.5      Phos  2.9     Mg     1.7     (repleted)  TPro  6.4  /  Alb  3.4  /  TBili  0.3  /  DBili  x   /  AST  44<H>  /  ALT  82<H>  /  AlkPhos  60  12-03  F/S 123-250

## 2023-12-03 NOTE — PROGRESS NOTE ADULT - ASSESSMENT
58 male with HTN, CAD (s/p PCI 2008), HFrEF, CVA 2018, and T2DM presenting with chest pressure and unknown tachycardia that was shocked x1, C 11/1 found to have in-stent restenosis of pLAD and  of RCA with elevated RA and PA pressures and severely decreased. Admitted to CICU for management of cardiogenic shock and ADHF requiring IABP 11/1 -11/7, with hospital course c/b vfib arrest requiring reinsertion of IABP. Currently listed for transplant status 2.    Plan:  ====================== NEUROLOGY=====================  Anxiety  - No Seroquel/antipsychotics since vfib arrest and prolonged QTC  - Psych Eval, recommended SSRI, but pt. refused   - Psych recommending atarax PRN  - Continue to monitor mental status    PT/Conditioning  - Continue band exercised while on bedrest s/t IABP    ==================== RESPIRATORY======================  Acute Hypoxemic Respiratory Failure  - s/p x2 intubations for cardiogenic pulm edema and the in setting of cardiac arrest, resolved - extubated 11/10  - Currently on room air with spO2 mid 90s  - Continue incentive spirometry and monitoring of sp02    Asthma  - c/w albuterol, symbicort and spiriva  - On trelegy at home  - Continue to monitor SpO2 with goal >94%    ====================CARDIOVASCULAR==================  Vfib arrest i/s/o ischemia  - Lido gtt off 11/13  - PO Amio load - total of 5g per EP complete 11/17, continue PO amio  - Keep K > 4, Mag > 2.2     Cardiogenic shock requiring IABP (11/1- 11/7, 11/9-)  - Likely 2/2 NSTEMI and ADHF  - 11/1 LHC: pLAD 100 % in-stent restenosis & mRCA, 100 %. PCWP 30. IABP placed.  - 11/1 TTE: LV dilated. EF 32 %. Regional WMAs present, mod (grade 2) LV diastolic dysfunction  - 11/2 TTE: EF 22% and + LV thrombus  - 11/29 s/p 500 cc bolus  - IABP swapped 11/20 to RFA, continue 1:1 support  - Off Milrinone gtt @ 7:30 am 11/11  - Currently on hydralazine 100 TID and ISD 40 TID for AL reduction  - [place] d/c'd due to elevated K levels  - c/w coreg 25 BID for GDMT  - Listed OHT status 2 11/22, f/u HF recs    NSTEMI iso stent re-occlusion of pLAD and 100%  of RCA  - EKG on admission w/ LBBB  - DAPT: c/w ASA, Brilinta d/c'd per transplant w/u  - c/w lipitor 80  - cMR deferred given necessity of IABP  - CT sx not recommending CABG, undergoing AT eval    LV thrombus  - c/w heparin gtt    ===================== RENAL =========================  Non-oliguric ARIC   - Baseline Cr: 1-1.22  - s/p 500 LR 11/29  - Renal US: no evidence of renal artery stenosis  - Trend BMP, lytes daily, replace as needed  - Continue Strict I/Os, avoid nephrotoxins    Mild Hyponatremia/Hyperkalemia iso ARIC and or new CKD baseline  - Continue fluid restriction   - Urea powder d/c'd per renal  - Trend daily  - Continue monitoring urine output, lytes, SCr/ BUN  - Replete lytes prn with goal K >4 and Mg >2    =============== GASTROINTESTINAL===================  Constipation/ileus, resolved  - s/p multiple BMs, symptoms improving   - c/w diet    ===================ENDO====================  Type 2 DM  - A1c 8.3  - Continue lantus, premeal, low ISS  - continue FS    ===================HEMATOLOGIC/ONC ===================  - H/H & plts stable  - Monitor H/H and plts  - VTE PPX: heparin gtt    ==================INFECTIOUS DISEASE================  # Leukocytosis  - Repeat BCx pending  - Monitor off abx at this time  - Monitor and trend WBC and temperature curve     # Enterococcus faecalis bacteremia, resolved  - BCx 11/17+ for enterococcus faecalis x2, Staph epi x1 (likely contaminant)- pan sensitive   - Urine cx 11/18 + enterococcus faecalis  - BCx 11/18 NGTD  - IABP site swapped to RFA 11/20  - s/p Vancomycin 1g q12h (11/18-11/27)  - CT A/P negative for infectious pathology    # COVID, resolved  - Off airborne precautions 11/11    # Pre-transplant ID w/u   - Trend ID recs for serologies   - Colonoscopy 11/17 - normal   - Chest CT 11/17 - improved LLL aeration  - s/p immunizations   58 male with HTN, CAD (s/p PCI 2008), HFrEF, CVA 2018, and T2DM presenting with chest pressure and unknown tachycardia that was shocked x1, C 11/1 found to have in-stent restenosis of pLAD and  of RCA with elevated RA and PA pressures and severely decreased. Admitted to CICU for management of cardiogenic shock and ADHF requiring IABP 11/1 -11/7, with hospital course c/b vfib arrest requiring reinsertion of IABP. Currently listed for transplant status 2.    Plan:  ====================== NEUROLOGY=====================  Anxiety  - No Seroquel/antipsychotics since vfib arrest and prolonged QTC  - Psych Eval, recommended SSRI, but pt. refused   - Psych recommending atarax PRN  - Continue to monitor mental status    PT/Conditioning  - Continue band exercised while on bedrest s/t IABP    ==================== RESPIRATORY======================  Acute Hypoxemic Respiratory Failure  - s/p x2 intubations for cardiogenic pulm edema and the in setting of cardiac arrest, resolved - extubated 11/10  - Currently on room air with spO2 mid 90s  - Continue incentive spirometry and monitoring of sp02    Asthma  - c/w albuterol, symbicort and spiriva  - On trelegy at home  - Continue to monitor SpO2 with goal >94%    ====================CARDIOVASCULAR==================  Vfib arrest i/s/o ischemia  - Lido gtt off 11/13  - PO Amio load - total of 5g per EP complete 11/17, continue PO amio  - Keep K > 4, Mag > 2.2     Cardiogenic shock requiring IABP (11/1- 11/7, 11/9-)  - Likely 2/2 NSTEMI and ADHF  - 11/1 LHC: pLAD 100 % in-stent restenosis & mRCA, 100 %. PCWP 30. IABP placed.  - 11/1 TTE: LV dilated. EF 32 %. Regional WMAs present, mod (grade 2) LV diastolic dysfunction  - 11/2 TTE: EF 22% and + LV thrombus  - 11/29 s/p 500 cc bolus  - IABP swapped 11/20 to RFA, continue 1:1 support  - Off Milrinone gtt @ 7:30 am 11/11  - Currently on hydralazine 100 TID and ISD 40 TID for AL reduction  - [place] d/c'd due to elevated K levels  - c/w coreg 25 BID for GDMT  - Listed OHT status 2 11/22, f/u HF recs    NSTEMI iso stent re-occlusion of pLAD and 100%  of RCA  - EKG on admission w/ LBBB  - DAPT: c/w ASA, Brilinta d/c'd per transplant w/u  - c/w lipitor 80  - cMR deferred given necessity of IABP  - CT sx not recommending CABG, undergoing AT eval    LV thrombus  - c/w heparin gtt    ===================== RENAL =========================  Non-oliguric ARIC   - Baseline Cr: 1-1.22  - s/p 500 LR 11/29  - Renal US: no evidence of renal artery stenosis  - Trend BMP, lytes daily, replace as needed  - Continue Strict I/Os, avoid nephrotoxins    Mild Hyponatremia/Hyperkalemia iso ARIC and or new CKD baseline  - Continue fluid restriction   - Urea powder d/c'd per renal  - Trend daily  - Continue monitoring urine output, lytes, SCr/ BUN  - Replete lytes prn with goal K >4 and Mg >2    =============== GASTROINTESTINAL===================  Constipation/ileus, resolved  - s/p multiple BMs, symptoms improving   - c/w diet    ===================ENDO====================  Type 2 DM  - A1c 8.3  - Continue lantus, premeal, low ISS  - continue FS    ===================HEMATOLOGIC/ONC ===================  - H/H & plts stable  - Monitor H/H and plts  - VTE PPX: heparin gtt    ==================INFECTIOUS DISEASE================  # Leukocytosis  - Repeat BCx pending  - Monitor off abx at this time  - Monitor and trend WBC and temperature curve     # Enterococcus faecalis bacteremia, resolved  - BCx 11/17+ for enterococcus faecalis x2, Staph epi x1 (likely contaminant)- pan sensitive   - Urine cx 11/18 + enterococcus faecalis  - BCx 11/18 NGTD  - IABP site swapped to RFA 11/20  - s/p Vancomycin 1g q12h (11/18-11/27)  - CT A/P negative for infectious pathology    # COVID, resolved  - Off airborne precautions 11/11    # Pre-transplant ID w/u   - Trend ID recs for serologies   - Colonoscopy 11/17 - normal   - Chest CT 11/17 - improved LLL aeration  - s/p immunizations   58 male with HTN, CAD (s/p PCI 2008), HFrEF, CVA 2018, and T2DM presenting with chest pressure and unknown tachycardia that was shocked x1, C 11/1 found to have in-stent restenosis of pLAD and  of RCA with elevated RA and PA pressures and severely decreased. Admitted to CICU for management of cardiogenic shock and ADHF requiring IABP 11/1 -11/7, with hospital course c/b vfib arrest requiring reinsertion of IABP. Currently listed for transplant status 2.    Plan:  ====================== NEUROLOGY=====================  Anxiety  - No Seroquel/antipsychotics since vfib arrest and prolonged QTC  - Psych Eval, recommended SSRI, but pt. refused   - Psych recommending atarax PRN  - Continue to monitor mental status    PT/Conditioning  - Continue band exercised while on bedrest s/t IABP    ==================== RESPIRATORY======================  Acute Hypoxemic Respiratory Failure  - s/p x2 intubations for cardiogenic pulm edema and the in setting of cardiac arrest, resolved - extubated 11/10  - Currently on room air with spO2 mid 90s  - Continue incentive spirometry and monitoring of sp02    Asthma  - c/w albuterol, symbicort and spiriva  - On trelegy at home  - Continue to monitor SpO2 with goal >94%    ====================CARDIOVASCULAR==================  Vfib arrest i/s/o ischemia  - Lido gtt off 11/13  - PO Amio load - total of 5g per EP complete 11/17, continue PO amio  - Keep K > 4, Mag > 2.2     Cardiogenic shock requiring IABP (11/1- 11/7, 11/9-)  - Likely 2/2 NSTEMI and ADHF  - 11/1 LHC: pLAD 100 % in-stent restenosis & mRCA, 100 %. PCWP 30. IABP placed.  - 11/1 TTE: LV dilated. EF 32 %. Regional WMAs present, mod (grade 2) LV diastolic dysfunction  - 11/2 TTE: EF 22% and + LV thrombus  - 11/29 s/p 500 cc bolus  - IABP swapped 11/20 to RFA, continue 1:1 support  - Off Milrinone gtt @ 7:30 am 11/11  - Currently on hydralazine 100 TID and ISD 40 TID for AL reduction  - [place] d/c'd due to elevated K levels  - c/w coreg 25 BID for GDMT  - Listed OHT status 2 11/22, f/u HF recs    NSTEMI iso stent re-occlusion of pLAD and 100%  of RCA  - EKG on admission w/ LBBB  - DAPT: c/w ASA, Brilinta d/c'd per transplant w/u  - c/w lipitor 80  - cMR deferred given necessity of IABP  - CT sx not recommending CABG, undergoing AT eval    LV thrombus  - c/w heparin gtt    ===================== RENAL =========================  Non-oliguric ARIC   - Baseline Cr: 1-1.22  - s/p 500 LR 11/29  - Renal US: no evidence of renal artery stenosis  - Trend BMP, lytes daily, replace as needed  - Continue Strict I/Os, avoid nephrotoxins    Mild Hyponatremia/Hyperkalemia iso ARIC and or new CKD baseline  - Continue fluid restriction   - Urea powder d/c'd per renal  - Trend daily  - Continue monitoring urine output, lytes, SCr/ BUN  - Replete lytes prn with goal K >4 and Mg >2    =============== GASTROINTESTINAL===================  Constipation/ileus, resolved  - s/p multiple BMs, symptoms improving   - c/w diet    ===================ENDO====================  Type 2 DM  - A1c 8.3  - Continue lantus, premeal, low ISS  - continue FS    ===================HEMATOLOGIC/ONC ===================  - H/H & plts stable  - Monitor H/H and plts  - VTE PPX: heparin gtt    ==================INFECTIOUS DISEASE================  # Leukocytosis  - Repeat BCx pending  - Monitor off abx at this time  - Monitor and trend WBC and temperature curve     # Enterococcus faecalis bacteremia, resolved  - BCx 11/17+ for enterococcus faecalis x2, Staph epi x1 (likely contaminant)- pan sensitive   - Urine cx 11/18 + enterococcus faecalis  - BCx 11/18 NGTD  - IABP site swapped to RFA 11/20  - s/p Vancomycin 1g q12h (11/18-11/27)  - CT A/P negative for infectious pathology    # COVID, resolved  - Off airborne precautions 11/11    # Pre-transplant ID w/u   - Trend ID recs for serologies   - Colonoscopy 11/17 - normal   - Chest CT 11/17 - improved LLL aeration  - s/p immunizations   58 male with HTN, CAD (s/p PCI 2008), HFrEF, CVA 2018, and T2DM presenting with chest pressure and unknown tachycardia that was shocked x1, C 11/1 found to have in-stent restenosis of pLAD and  of RCA with elevated RA and PA pressures and severely decreased. Admitted to CICU for management of cardiogenic shock and ADHF requiring IABP 11/1 -11/7, with hospital course c/b vfib arrest requiring reinsertion of IABP. Currently listed for transplant status 2.    Plan:  ====================== NEUROLOGY=====================  Anxiety  - No Seroquel/antipsychotics since vfib arrest and prolonged QTC  - Psych Eval, recommended SSRI, but pt. refused   - Psych recommending atarax PRN  - Continue to monitor mental status    PT/Conditioning  - Continue band exercised while on bedrest s/t IABP    ==================== RESPIRATORY======================  Acute Hypoxemic Respiratory Failure  - s/p x2 intubations for cardiogenic pulm edema and the in setting of cardiac arrest, resolved - extubated 11/10  - Currently on room air with spO2 mid 90s  - Continue incentive spirometry and monitoring of sp02    Asthma  - c/w albuterol, symbicort and spiriva  - On trelegy at home  - Continue to monitor SpO2 with goal >94%    ====================CARDIOVASCULAR==================  Vfib arrest i/s/o ischemia  - Lido gtt off 11/13  - PO Amio load - total of 5g per EP complete 11/17, continue PO amio  - Keep K > 4, Mag > 2.2     Cardiogenic shock requiring IABP (11/1- 11/7, 11/9-)  - Likely 2/2 NSTEMI and ADHF  - 11/1 LHC: pLAD 100 % in-stent restenosis & mRCA, 100 %. PCWP 30. IABP placed.  - 11/1 TTE: LV dilated. EF 32 %. Regional WMAs present, mod (grade 2) LV diastolic dysfunction  - 11/2 TTE: EF 22% and + LV thrombus  - 11/29 s/p 500 cc bolus  - IABP swapped 11/20 to RFA, continue 1:1 support  - Off Milrinone gtt @ 7:30 am 11/11  - Currently on hydralazine 100 TID and ISD 40 TID for AL reduction  - [place] d/c'd due to elevated K levels  - c/w coreg 25 BID for GDMT  - Listed OHT status 2 11/22, f/u HF recs  - HIT and HAIDER sent (12/3) as per HF     NSTEMI iso stent re-occlusion of pLAD and 100%  of RCA  - EKG on admission w/ LBBB  - DAPT: c/w ASA, Brilinta d/c'd per transplant w/u  - c/w lipitor 80  - cMR deferred given necessity of IABP  - CT sx not recommending CABG, undergoing AT eval    LV thrombus  - c/w heparin gtt    ===================== RENAL =========================  Non-oliguric ARIC   - Baseline Cr: 1-1.22  - s/p 500 LR 11/29  - Renal US: no evidence of renal artery stenosis  - Trend BMP, lytes daily, replace as needed  - Continue Strict I/Os, avoid nephrotoxins    Mild Hyponatremia/Hyperkalemia iso ARIC and or new CKD baseline  - Continue fluid restriction   - Urea powder d/c'd per renal  - Trend daily  - Continue monitoring urine output, lytes, SCr/ BUN  - Replete lytes prn with goal K >4 and Mg >2    =============== GASTROINTESTINAL===================  Constipation/ileus, resolved  - s/p multiple BMs, symptoms improving   - c/w diet    ===================ENDO====================  Type 2 DM  - A1c 8.3  - Continue lantus, premeal, low ISS  - continue FS    ===================HEMATOLOGIC/ONC ===================  - H/H & plts stable  - Monitor H/H and plts  - VTE PPX: heparin gtt    ==================INFECTIOUS DISEASE================  # Leukocytosis  - Repeat BCx pending  - Monitor off abx at this time  - Monitor and trend WBC and temperature curve     # Enterococcus faecalis bacteremia, resolved  - BCx 11/17+ for enterococcus faecalis x2, Staph epi x1 (likely contaminant)- pan sensitive   - Urine cx 11/18 + enterococcus faecalis  - BCx 11/18 NGTD  - IABP site swapped to RFA 11/20  - s/p Vancomycin 1g q12h (11/18-11/27)  - CT A/P negative for infectious pathology    # COVID, resolved  - Off airborne precautions 11/11    # Pre-transplant ID w/u   - Trend ID recs for serologies   - Colonoscopy 11/17 - normal   - Chest CT 11/17 - improved LLL aeration  - s/p immunizations   58 male with HTN, CAD (s/p PCI 2008), HFrEF, CVA 2018, and T2DM presenting with chest pressure and unknown tachycardia that was shocked x1, C 11/1 found to have in-stent restenosis of pLAD and  of RCA with elevated RA and PA pressures and severely decreased. Admitted to CICU for management of cardiogenic shock and ADHF requiring IABP 11/1 -11/7, with hospital course c/b vfib arrest requiring reinsertion of IABP. Currently listed for transplant status 2.    Plan:  ====================== NEUROLOGY=====================  Anxiety  - No Seroquel/antipsychotics since vfib arrest and prolonged QTC  - Psych Eval, recommended SSRI, but pt. refused   - Psych recommending atarax PRN  - Continue to monitor mental status    PT/Conditioning  - Continue band exercised while on bedrest s/t IABP    ==================== RESPIRATORY======================  Acute Hypoxemic Respiratory Failure  - s/p x2 intubations for cardiogenic pulm edema and the in setting of cardiac arrest, resolved - extubated 11/10  - Currently on room air with spO2 mid 90s  - Continue incentive spirometry and monitoring of sp02    Asthma  - c/w albuterol, symbicort and spiriva  - On trelegy at home  - Continue to monitor SpO2 with goal >94%    ====================CARDIOVASCULAR==================  Vfib arrest i/s/o ischemia  - Lido gtt off 11/13  - PO Amio load - total of 5g per EP complete 11/17, continue PO amio  - Keep K > 4, Mag > 2.2     Cardiogenic shock requiring IABP (11/1- 11/7, 11/9-)  - Likely 2/2 NSTEMI and ADHF  - 11/1 LHC: pLAD 100 % in-stent restenosis & mRCA, 100 %. PCWP 30. IABP placed.  - 11/1 TTE: LV dilated. EF 32 %. Regional WMAs present, mod (grade 2) LV diastolic dysfunction  - 11/2 TTE: EF 22% and + LV thrombus  - 11/29 s/p 500 cc bolus  - IABP swapped 11/20 to RFA, continue 1:1 support  - Off Milrinone gtt @ 7:30 am 11/11  - Currently on hydralazine 100 TID and ISD 40 TID for AL reduction  - [place] d/c'd due to elevated K levels  - c/w coreg 25 BID for GDMT  - Listed OHT status 2 11/22, f/u HF recs  - HIT and HAIDER sent (12/3) as per HF     NSTEMI iso stent re-occlusion of pLAD and 100%  of RCA  - EKG on admission w/ LBBB  - DAPT: c/w ASA, Brilinta d/c'd per transplant w/u  - c/w lipitor 80  - cMR deferred given necessity of IABP  - CT sx not recommending CABG, undergoing AT eval    LV thrombus  - c/w heparin gtt    ===================== RENAL =========================  Non-oliguric ARIC   - Baseline Cr: 1-1.22  - s/p 500 LR 11/29  - Renal US: no evidence of renal artery stenosis  - Trend BMP, lytes daily, replace as needed  - Continue Strict I/Os, avoid nephrotoxins    Mild Hyponatremia/Hyperkalemia iso ARIC and or new CKD baseline  - Continue fluid restriction   - Urea powder d/c'd per renal  - Trend daily  - Continue monitoring urine output, lytes, SCr/ BUN  - Replete lytes prn with goal K >4 and Mg >2    =============== GASTROINTESTINAL===================  Constipation/ileus, resolved  - s/p multiple BMs, symptoms improving   - c/w diet    ===================ENDO====================  Type 2 DM  - A1c 8.3  - Continue lantus, premeal, low ISS  - continue FS    ===================HEMATOLOGIC/ONC ===================  - H/H & plts stable  - Monitor H/H and plts  - VTE PPX: heparin gtt    ==================INFECTIOUS DISEASE================  # Leukocytosis  - Repeat BCx pending  - Monitor off abx at this time  - Monitor and trend WBC and temperature curve     # Enterococcus faecalis bacteremia, resolved  - BCx 11/17+ for enterococcus faecalis x2, Staph epi x1 (likely contaminant)- pan sensitive   - Urine cx 11/18 + enterococcus faecalis  - BCx 11/18 NGTD  - IABP site swapped to RFA 11/20  - s/p Vancomycin 1g q12h (11/18-11/27)  - CT A/P negative for infectious pathology    # COVID, resolved  - Off airborne precautions 11/11    # Pre-transplant ID w/u   - Trend ID recs for serologies   - Colonoscopy 11/17 - normal   - Chest CT 11/17 - improved LLL aeration  - s/p immunizations   58 male with HTN, CAD (s/p PCI 2008), HFrEF, CVA 2018, and T2DM presenting with chest pressure and unknown tachycardia that was shocked x1, C 11/1 found to have in-stent restenosis of pLAD and  of RCA with elevated RA and PA pressures and severely decreased. Admitted to CICU for management of cardiogenic shock and ADHF requiring IABP 11/1 -11/7, with hospital course c/b vfib arrest requiring reinsertion of IABP. Currently listed for transplant status 2.    Plan:  ====================== NEUROLOGY=====================  Anxiety  - No Seroquel/antipsychotics since vfib arrest and prolonged QTC  - Psych Eval, recommended SSRI, but pt. refused   - Psych recommending atarax PRN  - Continue to monitor mental status    PT/Conditioning  - Continue band exercised while on bedrest s/t IABP    ==================== RESPIRATORY======================  Acute Hypoxemic Respiratory Failure  - s/p x2 intubations for cardiogenic pulm edema and the in setting of cardiac arrest, resolved - extubated 11/10  - Currently on room air with spO2 mid 90s  - Continue incentive spirometry and monitoring of sp02    Asthma  - c/w albuterol, symbicort and spiriva  - On trelegy at home  - Continue to monitor SpO2 with goal >94%    ====================CARDIOVASCULAR==================  Vfib arrest i/s/o ischemia  - Lido gtt off 11/13  - PO Amio load - total of 5g per EP complete 11/17, continue PO amio  - Keep K > 4, Mag > 2.2     Cardiogenic shock requiring IABP (11/1- 11/7, 11/9-)  - Likely 2/2 NSTEMI and ADHF  - 11/1 LHC: pLAD 100 % in-stent restenosis & mRCA, 100 %. PCWP 30. IABP placed.  - 11/1 TTE: LV dilated. EF 32 %. Regional WMAs present, mod (grade 2) LV diastolic dysfunction  - 11/2 TTE: EF 22% and + LV thrombus  - 11/29 s/p 500 cc bolus  - IABP swapped 11/20 to RFA, continue 1:1 support  - Off Milrinone gtt @ 7:30 am 11/11  - Currently on hydralazine 100 TID and ISD 40 TID for AL reduction  - [place] d/c'd due to elevated K levels  - c/w coreg 25 BID for GDMT  - Listed OHT status 2 11/22, f/u HF recs  - HIT and HAIDER sent (12/3) as per HF     NSTEMI iso stent re-occlusion of pLAD and 100%  of RCA  - EKG on admission w/ LBBB  - DAPT: c/w ASA, Brilinta d/c'd per transplant w/u  - c/w lipitor 80  - cMR deferred given necessity of IABP  - CT sx not recommending CABG, undergoing AT eval    LV thrombus  - c/w heparin gtt    ===================== RENAL =========================  Non-oliguric ARIC   - Baseline Cr: 1-1.22  - s/p 500 LR 11/29  - Renal US: no evidence of renal artery stenosis  - Trend BMP, lytes daily, replace as needed  - Continue Strict I/Os, avoid nephrotoxins    Mild Hyponatremia/Hyperkalemia iso ARIC and or new CKD baseline  - Continue fluid restriction   - Urea powder d/c'd per renal  - Trend daily  - Continue monitoring urine output, lytes, SCr/ BUN  - Replete lytes prn with goal K >4 and Mg >2    =============== GASTROINTESTINAL===================  Constipation/ileus, resolved  - s/p multiple BMs, symptoms improving   - c/w diet    ===================ENDO====================  Type 2 DM  - A1c 8.3  - Continue lantus, premeal, low ISS  - continue FS    ===================HEMATOLOGIC/ONC ===================  - H/H & plts stable  - Monitor H/H and plts  - VTE PPX: heparin gtt    ==================INFECTIOUS DISEASE================  # Leukocytosis  - Repeat BCx pending  - Monitor off abx at this time  - Monitor and trend WBC and temperature curve     # Enterococcus faecalis bacteremia, resolved  - BCx 11/17+ for enterococcus faecalis x2, Staph epi x1 (likely contaminant)- pan sensitive   - Urine cx 11/18 + enterococcus faecalis  - BCx 11/18 NGTD  - IABP site swapped to RFA 11/20  - s/p Vancomycin 1g q12h (11/18-11/27)  - CT A/P negative for infectious pathology    # COVID, resolved  - Off airborne precautions 11/11    # Pre-transplant ID w/u   - Trend ID recs for serologies   - Colonoscopy 11/17 - normal   - Chest CT 11/17 - improved LLL aeration  - s/p immunizations

## 2023-12-03 NOTE — PROGRESS NOTE ADULT - PROBLEM SELECTOR PLAN 1
ARIC: in the setting of heart failure, COVID infection. At the time of presentation Scr is 1.25. Started to worsen on 11/4 and peaked at 4.07 11/10 and gradually improved to 1.8 on 11/18. Pt had LHC on 11/1. He has hx of DM with proteinuria of 684 but most recent UA negative. Primary team is planning to get cardiac transplant eval. Remains nonoliguric, UOP 1.7L/24h. HIV, Hep B, Hep C negative. A1c - 8.3. Complements are not low. BETZAIDA and ANCA negative. Light chains - not significant. UPCR <0.1, PLA2R ab - negative, Anti GBM abs - negative. UPEP negative.   Duplex - normal, symmetric blood flow throughout both kidneys. Velocities and RIs are limited by IABP.   Serum creatinine has worsened slightly  to 1.9  then today it improved to 1.52---> 1.2 today.       No indication for dialysis.

## 2023-12-03 NOTE — PROGRESS NOTE ADULT - PROBLEM SELECTOR PLAN 1
- L IABP at 1:1, placed 11/9. Exchange to LFA given high grade bacteremia on 11/20. On AC with Heparin infusion (also for LV thrombus)  - Continue Coreg 25 mg BID hold for MAP <65  - Continue HDZN 100 mg TID and ISDN 40 mg TID, hold for MAP <65  - Off romel given HyperK  - AT evaluation: Accepted for transplant, currently listed UNOS Status 2. ABO A. PRA 0% 11/13. Last Brilinta dose was on 11/13  - Please keep primary Dr. Banuelos [phone] in the loop per family request. - L IABP at 1:1, placed 11/9. Exchange to LFA given high grade bacteremia on 11/20. On AC with Heparin infusion (also for LV thrombus)  - Continue Coreg 25 mg BID hold for MAP <65  - Continue HDZN 100 mg TID and ISDN 40 mg TID, hold for MAP <65  - Off romel given HyperK  - AT evaluation: Accepted for transplant, currently listed UNOS Status 2. ABO A. PRA 0% 11/13. Last Brilinta dose was on 11/13  - Please keep primary Dr. Bnauelos [phone] in the loop per family request. - L IABP at 1:1, placed 11/9. Exchange to LFA given high grade bacteremia on 11/20. On AC with Heparin infusion (also for LV thrombus)  - Continue Coreg 25 mg BID hold for MAP <65  - Continue HDZN 100 mg TID and ISDN 40 mg TID, hold for MAP <65  - Off romel given HyperK  - AT evaluation: Accepted for transplant, currently listed UNOS Status 2. ABO A. PRA 0% 11/13. Last Brilinta dose was on 11/13  - Please keep primary Dr. Banuelos [phone] in the loop per family request.

## 2023-12-03 NOTE — PROGRESS NOTE ADULT - PROBLEM SELECTOR PLAN 4
- Cr on admission 1.2, peaked to 4  - Support as above.  - Improving  - Appreciate CardioRenal input.

## 2023-12-03 NOTE — PROGRESS NOTE ADULT - ASSESSMENT
58 yrs old male w/ hx of HFrEF (LVIDd 6.4 cm, LVEF 32%), CAD s/p PCI (2008), HTN, DMT2 (A1c 8.3%) and CVA s/p TPA (2018), recently treated for PNA who initially presented to [facility] via EMS after syncope reportedly requiring defibrilation. Treated for ACS but left AMA to come to [facility]. Pt has covid and was placed on IABP for hypotension. Now being evaluated for Heart transplant Vs LVAD placement. Nephrology consulted for ARIC.               58 yrs old male w/ hx of HFrEF (LVIDd 6.4 cm, LVEF 32%), CAD s/p PCI (2008), HTN, DMT2 (A1c 8.3%) and CVA s/p TPA (2018), recently treated for PNA who initially presented to [facility] via EMS after syncope reportedly requiring defibrilation. Treated for ACS but left AMA to come to [facility]. Pt has covid and was placed on IABP for hypotension. Now being evaluated for Heart transplant Vs LVAD placement. Nephrology consulted for ARIC.               58 yrs old male w/ hx of HFrEF (LVIDd 6.4 cm, LVEF 32%), CAD s/p PCI (2008), HTN, DMT2 (A1c 8.3%) and CVA s/p TPA (2018), recently treated for PNA who initially presented to [facility] via EMS after syncope reportedly requiring defibrilation. Treated for ACS but left AMA to come to [facility]. Pt has covid and was placed on IABP for hypotension. Now being evaluated for Heart transplant Vs LVAD placement. Nephrology consulted for ARCI.

## 2023-12-03 NOTE — PROGRESS NOTE ADULT - ASSESSMENT
57 yo M with HFrEF (LVIDd 6.4 cm, LVEF 32%), CAD s/p PCI (2008), HTN, DMT2 (A1c 8.3%) and CVA s/p TPA (2018), recently treated for PNA who initially presented to [facility] via EMS after syncope reportedly requiring defibrilation. Treated for ACS but left AMA to come to [facility]. On arrival ECG with ST depression in lateral leads. Intubated 11/1 for respiratory failure. Found to have COVID. L/RHC 11/1revealing  of LAD and RCA, elevated filling pressures and CI of 1.5 prompting placement of IABP. Extubated 11/3. IABP was weaned to off 11/7, then the following day on 11/8, developed PMVT cardiac arrest with prompt CPR and defibrillation, started on IV Lidocaine and Amio. IABP ultimately replaced on 11/9 for worsening hypotension. TTE 11/11 revealing LV thrombus.     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 AT evaluation launched 11/10, he is ABO A. He was discussed during TSC on 11/16 and was approved for listing, however was found to be Bacteremic with 11/17 Blc Cx growing mixed cultures Staph epi and Enterococcus faecalis and started on IV Vanco. Repeat cultures from 11/18 reveal NGTD and therefore was cleared by ID for listing.     He appears adequately supported on IABP at 1:1, electrically quiescent on oral Amiodarone. Cr is improving with holding diuretics. Labs otherwise notable for worsening thrombocytopenia. Awaiting suitable donor.       Cardiac Studies  11/21/23 TTE: limited unable to rule out endocarditis, technically difficult study  11/1123 TTE: LVEF 22% (global), LV thrombus, normal RV size and function, mild MR, trace TR  11/1/23  LHC showed pLAD 70 % stenosis in the ostium portion of the segment and 100 % in-stent restenosis and in mRCA, 100 % stenosis.   11/1/23 RHC; RA 23 Vw 24, PA 52/33/40, PCWP 30 Vw 33, AO 85/66/73, PA sat 54.4%, CO/CI (F) 2.96/1.44, IABP was placed during the cath through R common femoral artery.   11/1/23 TTE: LVIDd 6.4cm , LVEF 32%, regional WMA, grade II DD,  TAPSE 1.7cm, mld MR, trace TR,     Bedside hemodynamics  11/25 IABP 1:1: CVP 3, PA  33-36/12 mean PA 20-23 PCWP 15-16, MVO2 72.0%, CO/CI 6.2/2.8,   dsc? (PVR 0.8-1.1 medina calculated by me)   11/3: IABP 1:1 RA 5; PA 27/12/18; CO/CI 5.6/2.6; MAP 90-100s.  11/4/23 IABP 1:3 CVP 4 PA 37/18 SvO2 83.8 CO/CI 11.2 CI 5.1  (in the setting of fever to 38.3C)  11/5 IABP 1:1 CVP 3 PA 48/27 SvO2 78.4% CO 8.2 CI 3.7   11/1 (IABP 1:1): CVP 1, PA 33/5/16, MvO2 74.3% 57 yo M with HFrEF (LVIDd 6.4 cm, LVEF 32%), CAD s/p PCI (2008), HTN, DMT2 (A1c 8.3%) and CVA s/p TPA (2018), recently treated for PNA who initially presented to [facility] via EMS after syncope reportedly requiring defibrilation. Treated for ACS but left AMA to come to [facility]. On arrival ECG with ST depression in lateral leads. Intubated 11/1 for respiratory failure. Found to have COVID. L/RHC 11/1revealing  of LAD and RCA, elevated filling pressures and CI of 1.5 prompting placement of IABP. Extubated 11/3. IABP was weaned to off 11/7, then the following day on 11/8, developed PMVT cardiac arrest with prompt CPR and defibrillation, started on IV Lidocaine and Amio. IABP ultimately replaced on 11/9 for worsening hypotension. TTE 11/11 revealing LV thrombus.     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 AT evaluation launched 11/10, he is ABO A. He was discussed during TSC on 11/16 and was approved for listing, however was found to be Bacteremic with 11/17 Blc Cx growing mixed cultures Staph epi and Enterococcus faecalis and started on IV Vanco. Repeat cultures from 11/18 reveal NGTD and therefore was cleared by ID for listing.     He appears adequately supported on IABP at 1:1, electrically quiescent on oral Amiodarone. Cr is improving with holding diuretics. Labs otherwise notable for worsening thrombocytopenia. Awaiting suitable donor.       Cardiac Studies  11/21/23 TTE: limited unable to rule out endocarditis, technically difficult study  11/1123 TTE: LVEF 22% (global), LV thrombus, normal RV size and function, mild MR, trace TR  11/1/23  LHC showed pLAD 70 % stenosis in the ostium portion of the segment and 100 % in-stent restenosis and in mRCA, 100 % stenosis.   11/1/23 RHC; RA 23 Vw 24, PA 52/33/40, PCWP 30 Vw 33, AO 85/66/73, PA sat 54.4%, CO/CI (F) 2.96/1.44, IABP was placed during the cath through R common femoral artery.   11/1/23 TTE: LVIDd 6.4cm , LVEF 32%, regional WMA, grade II DD,  TAPSE 1.7cm, mld MR, trace TR,     Bedside hemodynamics  11/25 IABP 1:1: CVP 3, PA  33-36/12 mean PA 20-23 PCWP 15-16, MVO2 72.0%, CO/CI 6.2/2.8,   dsc? (PVR 0.8-1.1 medina calculated by me)   11/3: IABP 1:1 RA 5; PA 27/12/18; CO/CI 5.6/2.6; MAP 90-100s.  11/4/23 IABP 1:3 CVP 4 PA 37/18 SvO2 83.8 CO/CI 11.2 CI 5.1  (in the setting of fever to 38.3C)  11/5 IABP 1:1 CVP 3 PA 48/27 SvO2 78.4% CO 8.2 CI 3.7   11/1 (IABP 1:1): CVP 1, PA 33/5/16, MvO2 74.3% 57 yo M with HFrEF (LVIDd 6.4 cm, LVEF 32%), CAD s/p PCI (2008), HTN, DMT2 (A1c 8.3%) and CVA s/p TPA (2018), recently treated for PNA who initially presented to [facility] via EMS after syncope reportedly requiring defibrilation. Treated for ACS but left AMA to come to [facility]. On arrival ECG with ST depression in lateral leads. Intubated 11/1 for respiratory failure. Found to have COVID. L/RHC 11/1revealing  of LAD and RCA, elevated filling pressures and CI of 1.5 prompting placement of IABP. Extubated 11/3. IABP was weaned to off 11/7, then the following day on 11/8, developed PMVT cardiac arrest with prompt CPR and defibrillation, started on IV Lidocaine and Amio. IABP ultimately replaced on 11/9 for worsening hypotension. TTE 11/11 revealing LV thrombus.     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 AT evaluation launched 11/10, he is ABO A. He was discussed during TSC on 11/16 and was approved for listing, however was found to be Bacteremic with 11/17 Blc Cx growing mixed cultures Staph epi and Enterococcus faecalis and started on IV Vanco. Repeat cultures from 11/18 reveal NGTD and therefore was cleared by ID for listing.     He appears adequately supported on IABP at 1:1, electrically quiescent on oral Amiodarone. Cr is improving with holding diuretics. Labs otherwise notable for worsening thrombocytopenia. Awaiting suitable donor.       Cardiac Studies  11/21/23 TTE: limited unable to rule out endocarditis, technically difficult study  11/1123 TTE: LVEF 22% (global), LV thrombus, normal RV size and function, mild MR, trace TR  11/1/23  LHC showed pLAD 70 % stenosis in the ostium portion of the segment and 100 % in-stent restenosis and in mRCA, 100 % stenosis.   11/1/23 RHC; RA 23 Vw 24, PA 52/33/40, PCWP 30 Vw 33, AO 85/66/73, PA sat 54.4%, CO/CI (F) 2.96/1.44, IABP was placed during the cath through R common femoral artery.   11/1/23 TTE: LVIDd 6.4cm , LVEF 32%, regional WMA, grade II DD,  TAPSE 1.7cm, mld MR, trace TR,     Bedside hemodynamics  11/25 IABP 1:1: CVP 3, PA  33-36/12 mean PA 20-23 PCWP 15-16, MVO2 72.0%, CO/CI 6.2/2.8,   dsc? (PVR 0.8-1.1 medina calculated by me)   11/3: IABP 1:1 RA 5; PA 27/12/18; CO/CI 5.6/2.6; MAP 90-100s.  11/4/23 IABP 1:3 CVP 4 PA 37/18 SvO2 83.8 CO/CI 11.2 CI 5.1  (in the setting of fever to 38.3C)  11/5 IABP 1:1 CVP 3 PA 48/27 SvO2 78.4% CO 8.2 CI 3.7   11/1 (IABP 1:1): CVP 1, PA 33/5/16, MvO2 74.3%

## 2023-12-03 NOTE — PROGRESS NOTE ADULT - PROBLEM SELECTOR PLAN 2
Hyponatremia: developed during this hospital course. He is not symptomatic. Urine Osm 471 and Urine Na 88.   Given 150cc bolus of 3% saline on 11/24. Na today 130 and pt remained asymptomatic. His nausea has improved and he was on liquid diet. Today he was transitioned to regular diet. Urena was Dced on 11/30/23      Pt  hyponatremia is improving. Today 130   Fluid restriction till the hyponatremia resolves. Target < 1-1.5L a day.   rest of the management as per the primary team.

## 2023-12-03 NOTE — PROGRESS NOTE ADULT - SUBJECTIVE AND OBJECTIVE BOX
[facility] DIVISION OF KIDNEY DISEASES AND HYPERTENSION -- FOLLOW UP NOTE  --------------------------------------------------------------------------------  Chief Complaint:    24 hour events/subjective:        PAST HISTORY  --------------------------------------------------------------------------------  No significant changes to PMH, PSH, FHx, SHx, unless otherwise noted    ALLERGIES & MEDICATIONS  --------------------------------------------------------------------------------  Allergies    penicillins (Unknown)    Intolerances      Standing Inpatient Medications  aMIOdarone    Tablet   Oral   aMIOdarone    Tablet 200 milliGRAM(s) Oral daily  aspirin  chewable 81 milliGRAM(s) Oral daily  atorvastatin 80 milliGRAM(s) Oral at bedtime  budesonide  80 MICROgram(s)/formoterol 4.5 MICROgram(s) Inhaler 2 Puff(s) Inhalation two times a day  carvedilol 25 milliGRAM(s) Oral every 12 hours  chlorhexidine 2% Cloths 1 Application(s) Topical daily  heparin  Infusion 1300 Unit(s)/Hr IV Continuous <Continuous>  hydrALAZINE 100 milliGRAM(s) Oral three times a day  insulin glargine Injectable (LANTUS) 12 Unit(s) SubCutaneous at bedtime  insulin lispro (ADMELOG) corrective regimen sliding scale   SubCutaneous at bedtime  insulin lispro (ADMELOG) corrective regimen sliding scale   SubCutaneous three times a day before meals  insulin lispro Injectable (ADMELOG) 9 Unit(s) SubCutaneous three times a day before meals  isosorbide   dinitrate Tablet (ISORDIL) 40 milliGRAM(s) Oral three times a day  polyethylene glycol 3350 17 Gram(s) Oral two times a day  senna 2 Tablet(s) Oral at bedtime    PRN Inpatient Medications  hydrOXYzine hydrochloride 25 milliGRAM(s) Oral two times a day PRN      REVIEW OF SYSTEMS  --------------------------------------------------------------------------------  Gen: No weight changes, fatigue, fevers/chills, weakness  Skin: No rashes  Head/Eyes/Ears/Mouth: No headache; Normal hearing; Normal vision w/o blurriness; No sinus pain/discomfort, sore throat  Respiratory: No dyspnea, cough, wheezing, hemoptysis  CV: No chest pain, PND, orthopnea  GI: No abdominal pain, diarrhea, constipation, nausea, vomiting, melena, hematochezia  : No increased frequency, dysuria, hematuria, nocturia  MSK: No joint pain/swelling; no back pain; no edema  Neuro: No dizziness/lightheadedness, weakness, seizures, numbness, tingling  Heme: No easy bruising or bleeding  Endo: No heat/cold intolerance  Psych: No significant nervousness, anxiety, stress, depression    All other systems were reviewed and are negative, except as noted.    VITALS/PHYSICAL EXAM  --------------------------------------------------------------------------------  T(C): 36.9 (12-03-23 @ 03:00), Max: 36.9 (12-03-23 @ 03:00)  HR: 74 (12-03-23 @ 09:00) (69 - 85)  BP: 109/56 (12-02-23 @ 20:00) (109/56 - 109/56)  RR: 27 (12-03-23 @ 09:00) (15 - 27)  SpO2: 99% (12-03-23 @ 09:00) (93% - 99%)  Wt(kg): --        12-02-23 @ 07:01  -  12-03-23 @ 07:00  --------------------------------------------------------  IN: 976 mL / OUT: 1300 mL / NET: -324 mL    12-03-23 @ 07:01  -  12-03-23 @ 09:17  --------------------------------------------------------  IN: 278 mL / OUT: 720 mL / NET: -442 mL      Physical Exam:  	Gen: NAD, well-appearing  	HEENT: PERRL, supple neck, clear oropharynx  	Pulm: CTA B/L  	CV: RRR, S1S2; no rub  	Back: No spinal or CVA tenderness; no sacral edema  	Abd: +BS, soft, nontender/nondistended  	: No suprapubic tenderness  	UE: Warm, FROM, no clubbing, intact strength; no edema; no asterixis  	LE: Warm, FROM, no clubbing, intact strength; no edema  	Neuro: No focal deficits, intact gait  	Psych: Normal affect and mood  	Skin: Warm, without rashes  	Vascular access:    LABS/STUDIES  --------------------------------------------------------------------------------              10.8   7.21  >-----------<  126      [12-03-23 @ 00:36]              32.3     130  |  101  |  28  ----------------------------<  121      [12-03-23 @ 00:36]  4.0   |  18  |  1.21        Ca     9.5     [12-03-23 @ 00:36]      Mg     1.7     [12-03-23 @ 00:36]      Phos  2.9     [12-03-23 @ 00:36]    TPro  6.4  /  Alb  3.4  /  TBili  0.3  /  DBili  x   /  AST  44  /  ALT  82  /  AlkPhos  60  [12-03-23 @ 00:36]    PT/INR: PT 12.3 , INR 1.18       [12-03-23 @ 00:36]  PTT: 70.5       [12-03-23 @ 00:36]      Creatinine Trend:  SCr 1.21 [12-03 @ 00:36]  SCr 1.37 [12-02 @ 00:22]  SCr 1.48 [12-01 @ 01:24]  SCr 1.56 [11-30 @ 00:39]  SCr 1.64 [11-29 @ 12:42]    Urinalysis - [12-03-23 @ 00:36]      Color  / Appearance  / SG  / pH       Gluc 121 / Ketone   / Bili  / Urobili        Blood  / Protein  / Leuk Est  / Nitrite       RBC  / WBC  / Hyaline  / Gran  / Sq Epi  / Non Sq Epi  / Bacteria     Urine Creatinine 100      [11-29-23 @ 15:43]  Urine Sodium 21      [11-29-23 @ 15:43]  Urine Potassium 28      [11-29-23 @ 15:43]  Urine Osmolality 715      [11-29-23 @ 15:43]    Iron 46, TIBC 234, %sat 20      [11-10-23 @ 17:25]  Ferritin 1646      [11-10-23 @ 17:29]  TSH 0.38      [11-10-23 @ 17:29]  Lipid: chol 130, TG 95, HDL 37, LDL --      [11-10-23 @ 17:25]    HBsAb Nonreact      [11-10-23 @ 17:29]  HBcAb Nonreact      [11-10-23 @ 17:29]  HCV 0.09, Nonreact      [11-10-23 @ 17:29]    BETZAIDA: titer Negative, pattern --      [11-10-23 @ 17:29]  C3 Complement 171      [11-19-23 @ 02:54]  C4 Complement 45      [11-19-23 @ 02:54]  Rheumatoid Factor 13      [11-10-23 @ 17:25]  ANCA: cANCA Negative, pANCA Negative, atypical ANCA Negative      [11-21-23 @ 03:50]  anti-GBM <0.2      [11-19-23 @ 02:54]  Syphilis Screen (Treponema Pallidum Ab) Negative      [11-10-23 @ 17:29]  PLA2R: JACEK <1.8, IFA --      [11-19-23 @ 02:54]  Free Light Chains: kappa 3.76, lambda 3.25, ratio = 1.16      [11-19 @ 02:54]  Immunofixation Serum:   No Monoclonal Band Identified      Reference Range: None Detected      [11-19-23 @ 02:54]  SPEP Interpretation: Normal Electrophoresis Pattern      [11-10-23 @ 17:29]  Immunofixation Urine: No Monoclonal Band Identified      Reference Range: None Detected      [11-12-23 @ 05:03]  UPEP Interpretation: Normal Electrophoresis Pattern      [11-12-23 @ 05:03]   [facility] DIVISION OF KIDNEY DISEASES AND HYPERTENSION -- FOLLOW UP NOTE  --------------------------------------------------------------------------------  Chief Complaint:    24 hour events/subjective:        PAST HISTORY  --------------------------------------------------------------------------------  No significant changes to PMH, PSH, FHx, SHx, unless otherwise noted    ALLERGIES & MEDICATIONS  --------------------------------------------------------------------------------  Allergies    penicillins (Unknown)    Intolerances      Standing Inpatient Medications  aMIOdarone    Tablet   Oral   aMIOdarone    Tablet 200 milliGRAM(s) Oral daily  aspirin  chewable 81 milliGRAM(s) Oral daily  atorvastatin 80 milliGRAM(s) Oral at bedtime  budesonide  80 MICROgram(s)/formoterol 4.5 MICROgram(s) Inhaler 2 Puff(s) Inhalation two times a day  carvedilol 25 milliGRAM(s) Oral every 12 hours  chlorhexidine 2% Cloths 1 Application(s) Topical daily  heparin  Infusion 1300 Unit(s)/Hr IV Continuous <Continuous>  hydrALAZINE 100 milliGRAM(s) Oral three times a day  insulin glargine Injectable (LANTUS) 12 Unit(s) SubCutaneous at bedtime  insulin lispro (ADMELOG) corrective regimen sliding scale   SubCutaneous at bedtime  insulin lispro (ADMELOG) corrective regimen sliding scale   SubCutaneous three times a day before meals  insulin lispro Injectable (ADMELOG) 9 Unit(s) SubCutaneous three times a day before meals  isosorbide   dinitrate Tablet (ISORDIL) 40 milliGRAM(s) Oral three times a day  polyethylene glycol 3350 17 Gram(s) Oral two times a day  senna 2 Tablet(s) Oral at bedtime    PRN Inpatient Medications  hydrOXYzine hydrochloride 25 milliGRAM(s) Oral two times a day PRN      REVIEW OF SYSTEMS  --------------------------------------------------------------------------------  Gen: No weight changes, fatigue, fevers/chills, weakness  Skin: No rashes  Head/Eyes/Ears/Mouth: No headache; Normal hearing; Normal vision w/o blurriness; No sinus pain/discomfort, sore throat  Respiratory: No dyspnea, cough, wheezing, hemoptysis  CV: No chest pain, PND, orthopnea  GI: No abdominal pain, diarrhea, constipation, nausea, vomiting, melena, hematochezia  : No increased frequency, dysuria, hematuria, nocturia  MSK: No joint pain/swelling; no back pain; no edema  Neuro: No dizziness/lightheadedness, weakness, seizures, numbness, tingling  Heme: No easy bruising or bleeding  Endo: No heat/cold intolerance  Psych: No significant nervousness, anxiety, stress, depression    All other systems were reviewed and are negative, except as noted.    VITALS/PHYSICAL EXAM  --------------------------------------------------------------------------------  T(C): 36.9 (12-03-23 @ 03:00), Max: 36.9 (12-03-23 @ 03:00)  HR: 74 (12-03-23 @ 09:00) (69 - 85)  BP: 109/56 (12-02-23 @ 20:00) (109/56 - 109/56)  RR: 27 (12-03-23 @ 09:00) (15 - 27)  SpO2: 99% (12-03-23 @ 09:00) (93% - 99%)  Wt(kg): --        12-02-23 @ 07:01  -  12-03-23 @ 07:00  --------------------------------------------------------  IN: 976 mL / OUT: 1300 mL / NET: -324 mL    12-03-23 @ 07:01  -  12-03-23 @ 09:17  --------------------------------------------------------  IN: 278 mL / OUT: 720 mL / NET: -442 mL      Physical Exam:  	Gen: NAD, well-appearing  	HEENT: PERRL, supple neck, clear oropharynx  	Pulm: CTA B/L  	CV: RRR, S1S2; no rub  	Back: No spinal or CVA tenderness; no sacral edema  	Abd: +BS, soft, nontender/nondistended  	: No suprapubic tenderness  	UE: Warm, FROM, no clubbing, intact strength; no edema; no asterixis  	LE: Warm, FROM, no clubbing, intact strength; no edema  	Neuro: No focal deficits, intact gait  	Psych: Normal affect and mood  	Skin: Warm, without rashes  	Vascular access:    LABS/STUDIES  --------------------------------------------------------------------------------              10.8   7.21  >-----------<  126      [12-03-23 @ 00:36]              32.3     130  |  101  |  28  ----------------------------<  121      [12-03-23 @ 00:36]  4.0   |  18  |  1.21        Ca     9.5     [12-03-23 @ 00:36]      Mg     1.7     [12-03-23 @ 00:36]      Phos  2.9     [12-03-23 @ 00:36]    TPro  6.4  /  Alb  3.4  /  TBili  0.3  /  DBili  x   /  AST  44  /  ALT  82  /  AlkPhos  60  [12-03-23 @ 00:36]    PT/INR: PT 12.3 , INR 1.18       [12-03-23 @ 00:36]  PTT: 70.5       [12-03-23 @ 00:36]      Creatinine Trend:  SCr 1.21 [12-03 @ 00:36]  SCr 1.37 [12-02 @ 00:22]  SCr 1.48 [12-01 @ 01:24]  SCr 1.56 [11-30 @ 00:39]  SCr 1.64 [11-29 @ 12:42]    Urinalysis - [12-03-23 @ 00:36]      Color  / Appearance  / SG  / pH       Gluc 121 / Ketone   / Bili  / Urobili        Blood  / Protein  / Leuk Est  / Nitrite       RBC  / WBC  / Hyaline  / Gran  / Sq Epi  / Non Sq Epi  / Bacteria     Urine Creatinine 100      [11-29-23 @ 15:43]  Urine Sodium 21      [11-29-23 @ 15:43]  Urine Potassium 28      [11-29-23 @ 15:43]  Urine Osmolality 715      [11-29-23 @ 15:43]    Iron 46, TIBC 234, %sat 20      [11-10-23 @ 17:25]  Ferritin 1646      [11-10-23 @ 17:29]  TSH 0.38      [11-10-23 @ 17:29]  Lipid: chol 130, TG 95, HDL 37, LDL --      [11-10-23 @ 17:25]    HBsAb Nonreact      [11-10-23 @ 17:29]  HBcAb Nonreact      [11-10-23 @ 17:29]  HCV 0.09, Nonreact      [11-10-23 @ 17:29]    BETZAIDA: titer Negative, pattern --      [11-10-23 @ 17:29]  C3 Complement 171      [11-19-23 @ 02:54]  C4 Complement 45      [11-19-23 @ 02:54]  Rheumatoid Factor 13      [11-10-23 @ 17:25]  ANCA: cANCA Negative, pANCA Negative, atypical ANCA Negative      [11-21-23 @ 03:50]  anti-GBM <0.2      [11-19-23 @ 02:54]  Syphilis Screen (Treponema Pallidum Ab) Negative      [11-10-23 @ 17:29]  PLA2R: JACEK <1.8, IFA --      [11-19-23 @ 02:54]  Free Light Chains: kappa 3.76, lambda 3.25, ratio = 1.16      [11-19 @ 02:54]  Immunofixation Serum:   No Monoclonal Band Identified      Reference Range: None Detected      [11-19-23 @ 02:54]  SPEP Interpretation: Normal Electrophoresis Pattern      [11-10-23 @ 17:29]  Immunofixation Urine: No Monoclonal Band Identified      Reference Range: None Detected      [11-12-23 @ 05:03]  UPEP Interpretation: Normal Electrophoresis Pattern      [11-12-23 @ 05:03]   [facility] DIVISION OF KIDNEY DISEASES AND HYPERTENSION -- FOLLOW UP NOTE  --------------------------------------------------------------------------------  Chief Complaint:    24 hour events/subjective:        PAST HISTORY  --------------------------------------------------------------------------------  No significant changes to PMH, PSH, FHx, SHx, unless otherwise noted    ALLERGIES & MEDICATIONS  --------------------------------------------------------------------------------  Allergies    penicillins (Unknown)    Intolerances      Standing Inpatient Medications  aMIOdarone    Tablet   Oral   aMIOdarone    Tablet 200 milliGRAM(s) Oral daily  aspirin  chewable 81 milliGRAM(s) Oral daily  atorvastatin 80 milliGRAM(s) Oral at bedtime  budesonide  80 MICROgram(s)/formoterol 4.5 MICROgram(s) Inhaler 2 Puff(s) Inhalation two times a day  carvedilol 25 milliGRAM(s) Oral every 12 hours  chlorhexidine 2% Cloths 1 Application(s) Topical daily  heparin  Infusion 1300 Unit(s)/Hr IV Continuous <Continuous>  hydrALAZINE 100 milliGRAM(s) Oral three times a day  insulin glargine Injectable (LANTUS) 12 Unit(s) SubCutaneous at bedtime  insulin lispro (ADMELOG) corrective regimen sliding scale   SubCutaneous at bedtime  insulin lispro (ADMELOG) corrective regimen sliding scale   SubCutaneous three times a day before meals  insulin lispro Injectable (ADMELOG) 9 Unit(s) SubCutaneous three times a day before meals  isosorbide   dinitrate Tablet (ISORDIL) 40 milliGRAM(s) Oral three times a day  polyethylene glycol 3350 17 Gram(s) Oral two times a day  senna 2 Tablet(s) Oral at bedtime    PRN Inpatient Medications  hydrOXYzine hydrochloride 25 milliGRAM(s) Oral two times a day PRN      REVIEW OF SYSTEMS  --------------------------------------------------------------------------------  Gen: No weight changes, fatigue, fevers/chills, weakness  Skin: No rashes  Head/Eyes/Ears/Mouth: No headache; Normal hearing; Normal vision w/o blurriness; No sinus pain/discomfort, sore throat  Respiratory: No dyspnea, cough, wheezing, hemoptysis  CV: No chest pain, PND, orthopnea  GI: No abdominal pain, diarrhea, constipation, nausea, vomiting, melena, hematochezia  : No increased frequency, dysuria, hematuria, nocturia  MSK: No joint pain/swelling; no back pain; no edema  Neuro: No dizziness/lightheadedness, weakness, seizures, numbness, tingling  Heme: No easy bruising or bleeding  Endo: No heat/cold intolerance  Psych: No significant nervousness, anxiety, stress, depression    All other systems were reviewed and are negative, except as noted.    VITALS/PHYSICAL EXAM  --------------------------------------------------------------------------------  T(C): 36.9 (12-03-23 @ 03:00), Max: 36.9 (12-03-23 @ 03:00)  HR: 74 (12-03-23 @ 09:00) (69 - 85)  BP: 109/56 (12-02-23 @ 20:00) (109/56 - 109/56)  RR: 27 (12-03-23 @ 09:00) (15 - 27)  SpO2: 99% (12-03-23 @ 09:00) (93% - 99%)  Wt(kg): --        12-02-23 @ 07:01  -  12-03-23 @ 07:00  --------------------------------------------------------  IN: 976 mL / OUT: 1300 mL / NET: -324 mL    12-03-23 @ 07:01  -  12-03-23 @ 09:17  --------------------------------------------------------  IN: 278 mL / OUT: 720 mL / NET: -442 mL      Physical Exam:  	Gen: NAD, well-appearing  	HEENT: PERRL, supple neck, clear oropharynx  	Pulm: CTA B/L  	CV: RRR, S1S2; no rub  	Back: No spinal or CVA tenderness; no sacral edema  	Abd: +BS, soft, nontender/nondistended  	: No suprapubic tenderness  	UE: Warm, FROM, no clubbing, intact strength; no edema; no asterixis  	LE: Warm, FROM, no clubbing, intact strength; no edema  	Neuro: No focal deficits, intact gait  	Psych: Normal affect and mood  	Skin: Warm, without rashes  	Vascular access:    LABS/STUDIES  --------------------------------------------------------------------------------              10.8   7.21  >-----------<  126      [12-03-23 @ 00:36]              32.3     130  |  101  |  28  ----------------------------<  121      [12-03-23 @ 00:36]  4.0   |  18  |  1.21        Ca     9.5     [12-03-23 @ 00:36]      Mg     1.7     [12-03-23 @ 00:36]      Phos  2.9     [12-03-23 @ 00:36]    TPro  6.4  /  Alb  3.4  /  TBili  0.3  /  DBili  x   /  AST  44  /  ALT  82  /  AlkPhos  60  [12-03-23 @ 00:36]    PT/INR: PT 12.3 , INR 1.18       [12-03-23 @ 00:36]  PTT: 70.5       [12-03-23 @ 00:36]      Creatinine Trend:  SCr 1.21 [12-03 @ 00:36]  SCr 1.37 [12-02 @ 00:22]  SCr 1.48 [12-01 @ 01:24]  SCr 1.56 [11-30 @ 00:39]  SCr 1.64 [11-29 @ 12:42]    Urinalysis - [12-03-23 @ 00:36]      Color  / Appearance  / SG  / pH       Gluc 121 / Ketone   / Bili  / Urobili        Blood  / Protein  / Leuk Est  / Nitrite       RBC  / WBC  / Hyaline  / Gran  / Sq Epi  / Non Sq Epi  / Bacteria     Urine Creatinine 100      [11-29-23 @ 15:43]  Urine Sodium 21      [11-29-23 @ 15:43]  Urine Potassium 28      [11-29-23 @ 15:43]  Urine Osmolality 715      [11-29-23 @ 15:43]    Iron 46, TIBC 234, %sat 20      [11-10-23 @ 17:25]  Ferritin 1646      [11-10-23 @ 17:29]  TSH 0.38      [11-10-23 @ 17:29]  Lipid: chol 130, TG 95, HDL 37, LDL --      [11-10-23 @ 17:25]    HBsAb Nonreact      [11-10-23 @ 17:29]  HBcAb Nonreact      [11-10-23 @ 17:29]  HCV 0.09, Nonreact      [11-10-23 @ 17:29]    BETZAIDA: titer Negative, pattern --      [11-10-23 @ 17:29]  C3 Complement 171      [11-19-23 @ 02:54]  C4 Complement 45      [11-19-23 @ 02:54]  Rheumatoid Factor 13      [11-10-23 @ 17:25]  ANCA: cANCA Negative, pANCA Negative, atypical ANCA Negative      [11-21-23 @ 03:50]  anti-GBM <0.2      [11-19-23 @ 02:54]  Syphilis Screen (Treponema Pallidum Ab) Negative      [11-10-23 @ 17:29]  PLA2R: JACEK <1.8, IFA --      [11-19-23 @ 02:54]  Free Light Chains: kappa 3.76, lambda 3.25, ratio = 1.16      [11-19 @ 02:54]  Immunofixation Serum:   No Monoclonal Band Identified      Reference Range: None Detected      [11-19-23 @ 02:54]  SPEP Interpretation: Normal Electrophoresis Pattern      [11-10-23 @ 17:29]  Immunofixation Urine: No Monoclonal Band Identified      Reference Range: None Detected      [11-12-23 @ 05:03]  UPEP Interpretation: Normal Electrophoresis Pattern      [11-12-23 @ 05:03]

## 2023-12-03 NOTE — PROGRESS NOTE ADULT - ATTENDING COMMENTS
Feels well. Denies complaints. On exam, NAD, JVP wnl, RRR, no m/r/g, CTAB, nontender abdomen, no pedal edema. Labs reviewed  BUN/Cr 36/1.2, PLt 121 (HIT negative). Overall stage D HF, NYHA class IV, SCAI stage C who is well stabilized awaiting transplant as status 2 (ABO A).   - c/w current meds

## 2023-12-03 NOTE — PROGRESS NOTE ADULT - SUBJECTIVE AND OBJECTIVE BOX
Patient seen and examined at bedside.    Overnight Events: no events, issues or complaints      REVIEW OF SYSTEMS:  CONSTITUTIONAL: No weakness, fevers or chills  EYES/ENT: No visual changes;  No dysphagia  NECK: No pain or stiffness  RESPIRATORY: No cough, wheezing, hemoptysis; No shortness of breath  CARDIOVASCULAR: No chest pain or palpitations; No lower extremity edema  GASTROINTESTINAL: No abdominal or epigastric pain. No nausea, vomiting, or hematemesis; No diarrhea or constipation. No melena or hematochezia.  BACK: No back pain  GENITOURINARY: No dysuria, frequency or hematuria  NEUROLOGICAL: No numbness or weakness  SKIN: No itching, burning, rashes, or lesions   All other review of systems is negative unless indicated above.        Current Meds:  aMIOdarone    Tablet   Oral   aMIOdarone    Tablet 200 milliGRAM(s) Oral daily  aspirin  chewable 81 milliGRAM(s) Oral daily  atorvastatin 80 milliGRAM(s) Oral at bedtime  budesonide  80 MICROgram(s)/formoterol 4.5 MICROgram(s) Inhaler 2 Puff(s) Inhalation two times a day  carvedilol 25 milliGRAM(s) Oral every 12 hours  chlorhexidine 2% Cloths 1 Application(s) Topical daily  heparin  Infusion 1300 Unit(s)/Hr IV Continuous <Continuous>  hydrALAZINE 100 milliGRAM(s) Oral three times a day  hydrOXYzine hydrochloride 25 milliGRAM(s) Oral two times a day PRN  insulin glargine Injectable (LANTUS) 12 Unit(s) SubCutaneous at bedtime  insulin lispro (ADMELOG) corrective regimen sliding scale   SubCutaneous three times a day before meals  insulin lispro (ADMELOG) corrective regimen sliding scale   SubCutaneous at bedtime  insulin lispro Injectable (ADMELOG) 9 Unit(s) SubCutaneous three times a day before meals  isosorbide   dinitrate Tablet (ISORDIL) 40 milliGRAM(s) Oral three times a day  polyethylene glycol 3350 17 Gram(s) Oral two times a day  senna 2 Tablet(s) Oral at bedtime      PAST MEDICAL & SURGICAL HISTORY:  HTN (hypertension)      CAD (coronary artery disease)  2009; stent      Intracranial hemorrhage  2008      Respiratory arrest  december 1st      Myocardial infarction, unspecified MI type, unspecified artery      History of coronary artery stent placement          Vitals:  T(F): 98.3 (12-03), Max: 98.4 (12-03)  HR: 72 (12-03) (69 - 81)  BP: 109/56 (12-02) (109/56 - 109/56)  RR: 19 (12-03)  SpO2: 98% (12-03)  I&O's Summary    02 Dec 2023 07:01  -  03 Dec 2023 07:00  --------------------------------------------------------  IN: 976 mL / OUT: 1300 mL / NET: -324 mL    03 Dec 2023 07:01  -  03 Dec 2023 12:55  --------------------------------------------------------  IN: 514 mL / OUT: 1120 mL / NET: -606 mL        Physical Exam:  Appearance: No acute distress; well appearing  Eyes: PERRL, EOMI, pink conjunctiva  HENT: Normal oral mucosa  Cardiovascular: RRR, S1, S2, no murmurs, rubs, or gallops; no edema; no JVD  Respiratory: Clear to auscultation bilaterally  Gastrointestinal: soft, non-tender, non-distended with normal bowel sounds  Musculoskeletal: No clubbing; no joint deformity   Neurologic: Non-focal  Lymphatic: No lymphadenopathy  Psychiatry: AAOx3, mood & affect appropriate  Skin: No rashes, ecchymoses, or cyanosis                          10.8   7.21  )-----------( 126      ( 03 Dec 2023 00:36 )             32.3     12-03    130<L>  |  101  |  28<H>  ----------------------------<  121<H>  4.0   |  18<L>  |  1.21    Ca    9.5      03 Dec 2023 00:36  Phos  2.9     12-03  Mg     1.7     12-03    TPro  6.4  /  Alb  3.4  /  TBili  0.3  /  DBili  x   /  AST  44<H>  /  ALT  82<H>  /  AlkPhos  60  12-03    PT/INR - ( 03 Dec 2023 00:36 )   PT: 12.3 sec;   INR: 1.18 ratio         PTT - ( 03 Dec 2023 00:36 )  PTT:70.5 sec

## 2023-12-03 NOTE — PROGRESS NOTE ADULT - ATTENDING COMMENTS
Cardiogenic shock requiring mechanical support.   IABP.  Afterload reduction.  Listed for transplant.    Appreciate Heart Failure follow-up.

## 2023-12-04 LAB
-  BLOOD PCR PANEL: SIGNIFICANT CHANGE UP
-  BLOOD PCR PANEL: SIGNIFICANT CHANGE UP
ALBUMIN SERPL ELPH-MCNC: 3.6 G/DL — SIGNIFICANT CHANGE UP (ref 3.3–5)
ALBUMIN SERPL ELPH-MCNC: 3.6 G/DL — SIGNIFICANT CHANGE UP (ref 3.3–5)
ALBUMIN SERPL ELPH-MCNC: 3.8 G/DL — SIGNIFICANT CHANGE UP (ref 3.3–5)
ALBUMIN SERPL ELPH-MCNC: 3.8 G/DL — SIGNIFICANT CHANGE UP (ref 3.3–5)
ALP SERPL-CCNC: 59 U/L — SIGNIFICANT CHANGE UP (ref 40–120)
ALP SERPL-CCNC: 59 U/L — SIGNIFICANT CHANGE UP (ref 40–120)
ALP SERPL-CCNC: 67 U/L — SIGNIFICANT CHANGE UP (ref 40–120)
ALP SERPL-CCNC: 67 U/L — SIGNIFICANT CHANGE UP (ref 40–120)
ALT FLD-CCNC: 102 U/L — HIGH (ref 10–45)
ALT FLD-CCNC: 102 U/L — HIGH (ref 10–45)
ALT FLD-CCNC: 97 U/L — HIGH (ref 10–45)
ALT FLD-CCNC: 97 U/L — HIGH (ref 10–45)
ANION GAP SERPL CALC-SCNC: 10 MMOL/L — SIGNIFICANT CHANGE UP (ref 5–17)
ANION GAP SERPL CALC-SCNC: 10 MMOL/L — SIGNIFICANT CHANGE UP (ref 5–17)
ANION GAP SERPL CALC-SCNC: 11 MMOL/L — SIGNIFICANT CHANGE UP (ref 5–17)
ANION GAP SERPL CALC-SCNC: 11 MMOL/L — SIGNIFICANT CHANGE UP (ref 5–17)
APTT BLD: 67.5 SEC — HIGH (ref 24.5–35.6)
APTT BLD: 67.5 SEC — HIGH (ref 24.5–35.6)
AST SERPL-CCNC: 40 U/L — SIGNIFICANT CHANGE UP (ref 10–40)
AST SERPL-CCNC: 40 U/L — SIGNIFICANT CHANGE UP (ref 10–40)
AST SERPL-CCNC: 48 U/L — HIGH (ref 10–40)
AST SERPL-CCNC: 48 U/L — HIGH (ref 10–40)
BASOPHILS # BLD AUTO: 0.04 K/UL — SIGNIFICANT CHANGE UP (ref 0–0.2)
BASOPHILS # BLD AUTO: 0.04 K/UL — SIGNIFICANT CHANGE UP (ref 0–0.2)
BASOPHILS NFR BLD AUTO: 0.5 % — SIGNIFICANT CHANGE UP (ref 0–2)
BASOPHILS NFR BLD AUTO: 0.5 % — SIGNIFICANT CHANGE UP (ref 0–2)
BILIRUB SERPL-MCNC: 0.2 MG/DL — SIGNIFICANT CHANGE UP (ref 0.2–1.2)
BLD GP AB SCN SERPL QL: NEGATIVE — SIGNIFICANT CHANGE UP
BLD GP AB SCN SERPL QL: NEGATIVE — SIGNIFICANT CHANGE UP
BUN SERPL-MCNC: 26 MG/DL — HIGH (ref 7–23)
BUN SERPL-MCNC: 26 MG/DL — HIGH (ref 7–23)
BUN SERPL-MCNC: 32 MG/DL — HIGH (ref 7–23)
BUN SERPL-MCNC: 32 MG/DL — HIGH (ref 7–23)
CALCIUM SERPL-MCNC: 9.5 MG/DL — SIGNIFICANT CHANGE UP (ref 8.4–10.5)
CALCIUM SERPL-MCNC: 9.5 MG/DL — SIGNIFICANT CHANGE UP (ref 8.4–10.5)
CALCIUM SERPL-MCNC: 9.9 MG/DL — SIGNIFICANT CHANGE UP (ref 8.4–10.5)
CALCIUM SERPL-MCNC: 9.9 MG/DL — SIGNIFICANT CHANGE UP (ref 8.4–10.5)
CHLORIDE SERPL-SCNC: 102 MMOL/L — SIGNIFICANT CHANGE UP (ref 96–108)
CO2 SERPL-SCNC: 19 MMOL/L — LOW (ref 22–31)
CO2 SERPL-SCNC: 19 MMOL/L — LOW (ref 22–31)
CO2 SERPL-SCNC: 21 MMOL/L — LOW (ref 22–31)
CO2 SERPL-SCNC: 21 MMOL/L — LOW (ref 22–31)
CREAT SERPL-MCNC: 1.12 MG/DL — SIGNIFICANT CHANGE UP (ref 0.5–1.3)
CREAT SERPL-MCNC: 1.12 MG/DL — SIGNIFICANT CHANGE UP (ref 0.5–1.3)
CREAT SERPL-MCNC: 1.42 MG/DL — HIGH (ref 0.5–1.3)
CREAT SERPL-MCNC: 1.42 MG/DL — HIGH (ref 0.5–1.3)
CULTURE RESULTS: SIGNIFICANT CHANGE UP
CULTURE RESULTS: SIGNIFICANT CHANGE UP
EGFR: 57 ML/MIN/1.73M2 — LOW
EGFR: 57 ML/MIN/1.73M2 — LOW
EGFR: 76 ML/MIN/1.73M2 — SIGNIFICANT CHANGE UP
EGFR: 76 ML/MIN/1.73M2 — SIGNIFICANT CHANGE UP
EOSINOPHIL # BLD AUTO: 0.37 K/UL — SIGNIFICANT CHANGE UP (ref 0–0.5)
EOSINOPHIL # BLD AUTO: 0.37 K/UL — SIGNIFICANT CHANGE UP (ref 0–0.5)
EOSINOPHIL NFR BLD AUTO: 5 % — SIGNIFICANT CHANGE UP (ref 0–6)
EOSINOPHIL NFR BLD AUTO: 5 % — SIGNIFICANT CHANGE UP (ref 0–6)
GLUCOSE BLDC GLUCOMTR-MCNC: 140 MG/DL — HIGH (ref 70–99)
GLUCOSE BLDC GLUCOMTR-MCNC: 140 MG/DL — HIGH (ref 70–99)
GLUCOSE BLDC GLUCOMTR-MCNC: 150 MG/DL — HIGH (ref 70–99)
GLUCOSE BLDC GLUCOMTR-MCNC: 150 MG/DL — HIGH (ref 70–99)
GLUCOSE BLDC GLUCOMTR-MCNC: 159 MG/DL — HIGH (ref 70–99)
GLUCOSE BLDC GLUCOMTR-MCNC: 159 MG/DL — HIGH (ref 70–99)
GLUCOSE BLDC GLUCOMTR-MCNC: 187 MG/DL — HIGH (ref 70–99)
GLUCOSE BLDC GLUCOMTR-MCNC: 187 MG/DL — HIGH (ref 70–99)
GLUCOSE SERPL-MCNC: 160 MG/DL — HIGH (ref 70–99)
GLUCOSE SERPL-MCNC: 160 MG/DL — HIGH (ref 70–99)
GLUCOSE SERPL-MCNC: 201 MG/DL — HIGH (ref 70–99)
GLUCOSE SERPL-MCNC: 201 MG/DL — HIGH (ref 70–99)
GRAM STN FLD: ABNORMAL
GRAM STN FLD: ABNORMAL
HCT VFR BLD CALC: 34.7 % — LOW (ref 39–50)
HCT VFR BLD CALC: 34.7 % — LOW (ref 39–50)
HGB BLD-MCNC: 11.6 G/DL — LOW (ref 13–17)
HGB BLD-MCNC: 11.6 G/DL — LOW (ref 13–17)
IMM GRANULOCYTES NFR BLD AUTO: 0.4 % — SIGNIFICANT CHANGE UP (ref 0–0.9)
IMM GRANULOCYTES NFR BLD AUTO: 0.4 % — SIGNIFICANT CHANGE UP (ref 0–0.9)
INR BLD: 1.06 RATIO — SIGNIFICANT CHANGE UP (ref 0.85–1.18)
INR BLD: 1.06 RATIO — SIGNIFICANT CHANGE UP (ref 0.85–1.18)
LYMPHOCYTES # BLD AUTO: 1.14 K/UL — SIGNIFICANT CHANGE UP (ref 1–3.3)
LYMPHOCYTES # BLD AUTO: 1.14 K/UL — SIGNIFICANT CHANGE UP (ref 1–3.3)
LYMPHOCYTES # BLD AUTO: 15.6 % — SIGNIFICANT CHANGE UP (ref 13–44)
LYMPHOCYTES # BLD AUTO: 15.6 % — SIGNIFICANT CHANGE UP (ref 13–44)
MAGNESIUM SERPL-MCNC: 1.8 MG/DL — SIGNIFICANT CHANGE UP (ref 1.6–2.6)
MAGNESIUM SERPL-MCNC: 1.8 MG/DL — SIGNIFICANT CHANGE UP (ref 1.6–2.6)
MAGNESIUM SERPL-MCNC: 2.1 MG/DL — SIGNIFICANT CHANGE UP (ref 1.6–2.6)
MAGNESIUM SERPL-MCNC: 2.1 MG/DL — SIGNIFICANT CHANGE UP (ref 1.6–2.6)
MCHC RBC-ENTMCNC: 29.9 PG — SIGNIFICANT CHANGE UP (ref 27–34)
MCHC RBC-ENTMCNC: 29.9 PG — SIGNIFICANT CHANGE UP (ref 27–34)
MCHC RBC-ENTMCNC: 33.4 GM/DL — SIGNIFICANT CHANGE UP (ref 32–36)
MCHC RBC-ENTMCNC: 33.4 GM/DL — SIGNIFICANT CHANGE UP (ref 32–36)
MCV RBC AUTO: 89.4 FL — SIGNIFICANT CHANGE UP (ref 80–100)
MCV RBC AUTO: 89.4 FL — SIGNIFICANT CHANGE UP (ref 80–100)
METHOD TYPE: SIGNIFICANT CHANGE UP
METHOD TYPE: SIGNIFICANT CHANGE UP
MONOCYTES # BLD AUTO: 0.56 K/UL — SIGNIFICANT CHANGE UP (ref 0–0.9)
MONOCYTES # BLD AUTO: 0.56 K/UL — SIGNIFICANT CHANGE UP (ref 0–0.9)
MONOCYTES NFR BLD AUTO: 7.6 % — SIGNIFICANT CHANGE UP (ref 2–14)
MONOCYTES NFR BLD AUTO: 7.6 % — SIGNIFICANT CHANGE UP (ref 2–14)
NEUTROPHILS # BLD AUTO: 5.19 K/UL — SIGNIFICANT CHANGE UP (ref 1.8–7.4)
NEUTROPHILS # BLD AUTO: 5.19 K/UL — SIGNIFICANT CHANGE UP (ref 1.8–7.4)
NEUTROPHILS NFR BLD AUTO: 70.9 % — SIGNIFICANT CHANGE UP (ref 43–77)
NEUTROPHILS NFR BLD AUTO: 70.9 % — SIGNIFICANT CHANGE UP (ref 43–77)
NRBC # BLD: 0 /100 WBCS — SIGNIFICANT CHANGE UP (ref 0–0)
NRBC # BLD: 0 /100 WBCS — SIGNIFICANT CHANGE UP (ref 0–0)
PHOSPHATE SERPL-MCNC: 2.9 MG/DL — SIGNIFICANT CHANGE UP (ref 2.5–4.5)
PLATELET # BLD AUTO: 133 K/UL — LOW (ref 150–400)
PLATELET # BLD AUTO: 133 K/UL — LOW (ref 150–400)
POTASSIUM SERPL-MCNC: 3.8 MMOL/L — SIGNIFICANT CHANGE UP (ref 3.5–5.3)
POTASSIUM SERPL-MCNC: 3.8 MMOL/L — SIGNIFICANT CHANGE UP (ref 3.5–5.3)
POTASSIUM SERPL-MCNC: 4.6 MMOL/L — SIGNIFICANT CHANGE UP (ref 3.5–5.3)
POTASSIUM SERPL-MCNC: 4.6 MMOL/L — SIGNIFICANT CHANGE UP (ref 3.5–5.3)
POTASSIUM SERPL-SCNC: 3.8 MMOL/L — SIGNIFICANT CHANGE UP (ref 3.5–5.3)
POTASSIUM SERPL-SCNC: 3.8 MMOL/L — SIGNIFICANT CHANGE UP (ref 3.5–5.3)
POTASSIUM SERPL-SCNC: 4.6 MMOL/L — SIGNIFICANT CHANGE UP (ref 3.5–5.3)
POTASSIUM SERPL-SCNC: 4.6 MMOL/L — SIGNIFICANT CHANGE UP (ref 3.5–5.3)
PROT SERPL-MCNC: 6.5 G/DL — SIGNIFICANT CHANGE UP (ref 6–8.3)
PROT SERPL-MCNC: 6.5 G/DL — SIGNIFICANT CHANGE UP (ref 6–8.3)
PROT SERPL-MCNC: 7.1 G/DL — SIGNIFICANT CHANGE UP (ref 6–8.3)
PROT SERPL-MCNC: 7.1 G/DL — SIGNIFICANT CHANGE UP (ref 6–8.3)
PROTHROM AB SERPL-ACNC: 11.6 SEC — SIGNIFICANT CHANGE UP (ref 9.5–13)
PROTHROM AB SERPL-ACNC: 11.6 SEC — SIGNIFICANT CHANGE UP (ref 9.5–13)
RBC # BLD: 3.88 M/UL — LOW (ref 4.2–5.8)
RBC # BLD: 3.88 M/UL — LOW (ref 4.2–5.8)
RBC # FLD: 14.9 % — HIGH (ref 10.3–14.5)
RBC # FLD: 14.9 % — HIGH (ref 10.3–14.5)
RH IG SCN BLD-IMP: POSITIVE — SIGNIFICANT CHANGE UP
RH IG SCN BLD-IMP: POSITIVE — SIGNIFICANT CHANGE UP
SODIUM SERPL-SCNC: 132 MMOL/L — LOW (ref 135–145)
SODIUM SERPL-SCNC: 132 MMOL/L — LOW (ref 135–145)
SODIUM SERPL-SCNC: 133 MMOL/L — LOW (ref 135–145)
SODIUM SERPL-SCNC: 133 MMOL/L — LOW (ref 135–145)
SPECIMEN SOURCE: SIGNIFICANT CHANGE UP
SPECIMEN SOURCE: SIGNIFICANT CHANGE UP
UFH PPP CHRO-ACNC: 0.48 IU/ML — SIGNIFICANT CHANGE UP (ref 0.3–0.7)
UFH PPP CHRO-ACNC: 0.48 IU/ML — SIGNIFICANT CHANGE UP (ref 0.3–0.7)
WBC # BLD: 7.33 K/UL — SIGNIFICANT CHANGE UP (ref 3.8–10.5)
WBC # BLD: 7.33 K/UL — SIGNIFICANT CHANGE UP (ref 3.8–10.5)
WBC # FLD AUTO: 7.33 K/UL — SIGNIFICANT CHANGE UP (ref 3.8–10.5)
WBC # FLD AUTO: 7.33 K/UL — SIGNIFICANT CHANGE UP (ref 3.8–10.5)

## 2023-12-04 PROCEDURE — 99291 CRITICAL CARE FIRST HOUR: CPT | Mod: GC,25

## 2023-12-04 PROCEDURE — 99232 SBSQ HOSP IP/OBS MODERATE 35: CPT | Mod: GC

## 2023-12-04 PROCEDURE — 99233 SBSQ HOSP IP/OBS HIGH 50: CPT

## 2023-12-04 PROCEDURE — 99291 CRITICAL CARE FIRST HOUR: CPT

## 2023-12-04 PROCEDURE — 93010 ELECTROCARDIOGRAM REPORT: CPT

## 2023-12-04 PROCEDURE — 71045 X-RAY EXAM CHEST 1 VIEW: CPT | Mod: 26

## 2023-12-04 PROCEDURE — 99292 CRITICAL CARE ADDL 30 MIN: CPT

## 2023-12-04 RX ORDER — POTASSIUM CHLORIDE 20 MEQ
20 PACKET (EA) ORAL ONCE
Refills: 0 | Status: COMPLETED | OUTPATIENT
Start: 2023-12-04 | End: 2023-12-04

## 2023-12-04 RX ORDER — VANCOMYCIN HCL 1 G
1000 VIAL (EA) INTRAVENOUS ONCE
Refills: 0 | Status: COMPLETED | OUTPATIENT
Start: 2023-12-04 | End: 2023-12-04

## 2023-12-04 RX ORDER — MAGNESIUM SULFATE 500 MG/ML
2 VIAL (ML) INJECTION ONCE
Refills: 0 | Status: COMPLETED | OUTPATIENT
Start: 2023-12-04 | End: 2023-12-04

## 2023-12-04 RX ADMIN — ATORVASTATIN CALCIUM 80 MILLIGRAM(S): 80 TABLET, FILM COATED ORAL at 21:21

## 2023-12-04 RX ADMIN — INSULIN GLARGINE 12 UNIT(S): 100 INJECTION, SOLUTION SUBCUTANEOUS at 21:21

## 2023-12-04 RX ADMIN — BUDESONIDE AND FORMOTEROL FUMARATE DIHYDRATE 2 PUFF(S): 160; 4.5 AEROSOL RESPIRATORY (INHALATION) at 08:59

## 2023-12-04 RX ADMIN — AMIODARONE HYDROCHLORIDE 200 MILLIGRAM(S): 400 TABLET ORAL at 05:31

## 2023-12-04 RX ADMIN — CARVEDILOL PHOSPHATE 25 MILLIGRAM(S): 80 CAPSULE, EXTENDED RELEASE ORAL at 05:31

## 2023-12-04 RX ADMIN — ISOSORBIDE DINITRATE 40 MILLIGRAM(S): 5 TABLET ORAL at 05:31

## 2023-12-04 RX ADMIN — ISOSORBIDE DINITRATE 40 MILLIGRAM(S): 5 TABLET ORAL at 11:11

## 2023-12-04 RX ADMIN — HEPARIN SODIUM 14 UNIT(S)/HR: 5000 INJECTION INTRAVENOUS; SUBCUTANEOUS at 21:21

## 2023-12-04 RX ADMIN — POLYETHYLENE GLYCOL 3350 17 GRAM(S): 17 POWDER, FOR SOLUTION ORAL at 05:31

## 2023-12-04 RX ADMIN — Medication 9 UNIT(S): at 17:23

## 2023-12-04 RX ADMIN — Medication 9 UNIT(S): at 13:05

## 2023-12-04 RX ADMIN — Medication 9 UNIT(S): at 08:42

## 2023-12-04 RX ADMIN — Medication 1: at 13:05

## 2023-12-04 RX ADMIN — BUDESONIDE AND FORMOTEROL FUMARATE DIHYDRATE 2 PUFF(S): 160; 4.5 AEROSOL RESPIRATORY (INHALATION) at 20:41

## 2023-12-04 RX ADMIN — Medication 1: at 17:22

## 2023-12-04 RX ADMIN — Medication 250 MILLIGRAM(S): at 16:22

## 2023-12-04 RX ADMIN — SENNA PLUS 2 TABLET(S): 8.6 TABLET ORAL at 21:22

## 2023-12-04 RX ADMIN — Medication 20 MILLIEQUIVALENT(S): at 17:21

## 2023-12-04 RX ADMIN — CHLORHEXIDINE GLUCONATE 1 APPLICATION(S): 213 SOLUTION TOPICAL at 21:22

## 2023-12-04 RX ADMIN — Medication 25 GRAM(S): at 17:22

## 2023-12-04 RX ADMIN — Medication 100 MILLIGRAM(S): at 21:21

## 2023-12-04 RX ADMIN — ISOSORBIDE DINITRATE 40 MILLIGRAM(S): 5 TABLET ORAL at 16:23

## 2023-12-04 RX ADMIN — Medication 81 MILLIGRAM(S): at 11:11

## 2023-12-04 RX ADMIN — CARVEDILOL PHOSPHATE 25 MILLIGRAM(S): 80 CAPSULE, EXTENDED RELEASE ORAL at 17:21

## 2023-12-04 NOTE — PROGRESS NOTE ADULT - SUBJECTIVE AND OBJECTIVE BOX
[facility] Division of Kidney Diseases & Hypertension  FOLLOW UP NOTE  [phone]--------------------------------------------------------------------------------    Chief Complaint: ARIC     24 hour events/subjective:  Patient seen at bedside in CICU. Has no acute complaints, remains on IABP. Patient denies SOB/CP, N/V, or diarrhea.     PAST HISTORY  --------------------------------------------------------------------------------  No significant changes to PMH, PSH, FHx, SHx, unless otherwise noted    ALLERGIES & MEDICATIONS  --------------------------------------------------------------------------------  Allergies    penicillins (Unknown)    Intolerances      Standing Inpatient Medications  aMIOdarone    Tablet   Oral   aMIOdarone    Tablet 200 milliGRAM(s) Oral daily  aspirin  chewable 81 milliGRAM(s) Oral daily  atorvastatin 80 milliGRAM(s) Oral at bedtime  budesonide  80 MICROgram(s)/formoterol 4.5 MICROgram(s) Inhaler 2 Puff(s) Inhalation two times a day  carvedilol 25 milliGRAM(s) Oral every 12 hours  chlorhexidine 2% Cloths 1 Application(s) Topical daily  heparin  Infusion 1300 Unit(s)/Hr IV Continuous <Continuous>  hydrALAZINE 100 milliGRAM(s) Oral three times a day  insulin glargine Injectable (LANTUS) 12 Unit(s) SubCutaneous at bedtime  insulin lispro (ADMELOG) corrective regimen sliding scale   SubCutaneous at bedtime  insulin lispro (ADMELOG) corrective regimen sliding scale   SubCutaneous three times a day before meals  insulin lispro Injectable (ADMELOG) 9 Unit(s) SubCutaneous three times a day before meals  isosorbide   dinitrate Tablet (ISORDIL) 40 milliGRAM(s) Oral three times a day  polyethylene glycol 3350 17 Gram(s) Oral two times a day  senna 2 Tablet(s) Oral at bedtime    PRN Inpatient Medications  hydrOXYzine hydrochloride 25 milliGRAM(s) Oral two times a day PRN      REVIEW OF SYSTEMS  --------------------------------------------------------------------------------  per above     VITALS/PHYSICAL EXAM  --------------------------------------------------------------------------------  T(C): 36.9 (12-04-23 @ 08:00), Max: 36.9 (12-03-23 @ 14:00)  HR: 71 (12-04-23 @ 08:00) (70 - 87)  BP: 113/55 (12-03-23 @ 19:00) (113/55 - 113/55)  RR: 22 (12-04-23 @ 08:00) (13 - 24)  SpO2: 99% (12-04-23 @ 08:00) (96% - 99%)  Wt(kg): --        12-03-23 @ 07:01  -  12-04-23 @ 07:00  --------------------------------------------------------  IN: 1366 mL / OUT: 2220 mL / NET: -854 mL    12-04-23 @ 07:01  -  12-04-23 @ 10:24  --------------------------------------------------------  IN: 282 mL / OUT: 400 mL / NET: -118 mL      Physical Exam:  Gen: NAD, well-appearing  HEENT: PERRL, supple neck, clear oropharynx  Pulm: CTA B/L  CV: S1S2; IABP +  Back: No spinal or CVA tenderness  Abd: +BS, soft, nontender/nondistended  : No suprapubic tenderness   Extremities: no bilateral LE edema noted.   Neuro: No focal deficits  Skin: Interval improvement in the non- blanching rash. -mostly present in the b/l upper extremities.  Vascular access: NA    LABS/STUDIES  --------------------------------------------------------------------------------              11.6   7.33  >-----------<  133      [12-04-23 @ 00:40]              34.7     133  |  102  |  32  ----------------------------<  160      [12-04-23 @ 00:39]  4.6   |  21  |  1.42        Ca     9.9     [12-04-23 @ 00:39]      Mg     2.1     [12-04-23 @ 00:39]      Phos  2.9     [12-04-23 @ 00:39]    TPro  7.1  /  Alb  3.8  /  TBili  0.2  /  DBili  x   /  AST  48  /  ALT  102  /  AlkPhos  67  [12-04-23 @ 00:39]    PT/INR: PT 11.6 , INR 1.06       [12-04-23 @ 00:40]  PTT: 67.5       [12-04-23 @ 00:40]      Creatinine Trend:  SCr 1.42 [12-04 @ 00:39]  SCr 1.21 [12-03 @ 00:36]  SCr 1.37 [12-02 @ 00:22]  SCr 1.48 [12-01 @ 01:24]  SCr 1.56 [11-30 @ 00:39]    Urinalysis - [12-04-23 @ 00:39]      Color  / Appearance  / SG  / pH       Gluc 160 / Ketone   / Bili  / Urobili        Blood  / Protein  / Leuk Est  / Nitrite       RBC  / WBC  / Hyaline  / Gran  / Sq Epi  / Non Sq Epi  / Bacteria     Urine Creatinine 100      [11-29-23 @ 15:43]  Urine Sodium 21      [11-29-23 @ 15:43]  Urine Potassium 28      [11-29-23 @ 15:43]  Urine Osmolality 715      [11-29-23 @ 15:43]         [facility] Division of Kidney Diseases & Hypertension  FOLLOW UP NOTE  [phone]--------------------------------------------------------------------------------    Chief Complaint: ARIC     24 hour events/subjective:  Patient seen at bedside in CICU. Has no acute complaints, remains on IABP. Patient denies SOB/CP, N/V, or diarrhea.     PAST HISTORY  --------------------------------------------------------------------------------  No significant changes to PMH, PSH, FHx, SHx, unless otherwise noted    ALLERGIES & MEDICATIONS  --------------------------------------------------------------------------------  Allergies    penicillins (Unknown)    Intolerances      Standing Inpatient Medications  aMIOdarone    Tablet   Oral   aMIOdarone    Tablet 200 milliGRAM(s) Oral daily  aspirin  chewable 81 milliGRAM(s) Oral daily  atorvastatin 80 milliGRAM(s) Oral at bedtime  budesonide  80 MICROgram(s)/formoterol 4.5 MICROgram(s) Inhaler 2 Puff(s) Inhalation two times a day  carvedilol 25 milliGRAM(s) Oral every 12 hours  chlorhexidine 2% Cloths 1 Application(s) Topical daily  heparin  Infusion 1300 Unit(s)/Hr IV Continuous <Continuous>  hydrALAZINE 100 milliGRAM(s) Oral three times a day  insulin glargine Injectable (LANTUS) 12 Unit(s) SubCutaneous at bedtime  insulin lispro (ADMELOG) corrective regimen sliding scale   SubCutaneous at bedtime  insulin lispro (ADMELOG) corrective regimen sliding scale   SubCutaneous three times a day before meals  insulin lispro Injectable (ADMELOG) 9 Unit(s) SubCutaneous three times a day before meals  isosorbide   dinitrate Tablet (ISORDIL) 40 milliGRAM(s) Oral three times a day  polyethylene glycol 3350 17 Gram(s) Oral two times a day  senna 2 Tablet(s) Oral at bedtime    PRN Inpatient Medications  hydrOXYzine hydrochloride 25 milliGRAM(s) Oral two times a day PRN      REVIEW OF SYSTEMS  --------------------------------------------------------------------------------  per above     VITALS/PHYSICAL EXAM  --------------------------------------------------------------------------------  T(C): 36.9 (12-04-23 @ 08:00), Max: 36.9 (12-03-23 @ 14:00)  HR: 71 (12-04-23 @ 08:00) (70 - 87)  BP: 113/55 (12-03-23 @ 19:00) (113/55 - 113/55)  RR: 22 (12-04-23 @ 08:00) (13 - 24)  SpO2: 99% (12-04-23 @ 08:00) (96% - 99%)  Wt(kg): --        12-03-23 @ 07:01  -  12-04-23 @ 07:00  --------------------------------------------------------  IN: 1366 mL / OUT: 2220 mL / NET: -854 mL    12-04-23 @ 07:01  -  12-04-23 @ 10:24  --------------------------------------------------------  IN: 282 mL / OUT: 400 mL / NET: -118 mL      Physical Exam:  Gen: NAD, well-appearing  HEENT: PERRL, supple neck, clear oropharynx  Pulm: CTA B/L  CV: S1S2; IABP +  Back: No spinal or CVA tenderness  Abd: +BS, soft, nontender/nondistended  : No suprapubic tenderness   Extremities: no bilateral LE edema noted.   Neuro: No focal deficits  Skin: Interval improvement in the non- blanching rash. -mostly present in the b/l upper extremities.  Vascular access: NA    LABS/STUDIES  --------------------------------------------------------------------------------              11.6   7.33  >-----------<  133      [12-04-23 @ 00:40]              34.7     133  |  102  |  32  ----------------------------<  160      [12-04-23 @ 00:39]  4.6   |  21  |  1.42        Ca     9.9     [12-04-23 @ 00:39]      Mg     2.1     [12-04-23 @ 00:39]      Phos  2.9     [12-04-23 @ 00:39]    TPro  7.1  /  Alb  3.8  /  TBili  0.2  /  DBili  x   /  AST  48  /  ALT  102  /  AlkPhos  67  [12-04-23 @ 00:39]    PT/INR: PT 11.6 , INR 1.06       [12-04-23 @ 00:40]  PTT: 67.5       [12-04-23 @ 00:40]      Creatinine Trend:  SCr 1.42 [12-04 @ 00:39]  SCr 1.21 [12-03 @ 00:36]  SCr 1.37 [12-02 @ 00:22]  SCr 1.48 [12-01 @ 01:24]  SCr 1.56 [11-30 @ 00:39]    Urinalysis - [12-04-23 @ 00:39]      Color  / Appearance  / SG  / pH       Gluc 160 / Ketone   / Bili  / Urobili        Blood  / Protein  / Leuk Est  / Nitrite       RBC  / WBC  / Hyaline  / Gran  / Sq Epi  / Non Sq Epi  / Bacteria     Urine Creatinine 100      [11-29-23 @ 15:43]  Urine Sodium 21      [11-29-23 @ 15:43]  Urine Potassium 28      [11-29-23 @ 15:43]  Urine Osmolality 715      [11-29-23 @ 15:43]

## 2023-12-04 NOTE — PROGRESS NOTE ADULT - PROBLEM SELECTOR PLAN 4
- Cr on admission 1.2, peaked to 4--> 1.06 12/4  - Support as above.  - Improving  - Appreciate CardioRenal input.

## 2023-12-04 NOTE — PROGRESS NOTE ADULT - SUBJECTIVE AND OBJECTIVE BOX
PATIENT:  DEVORAH VALENCIA  [id number]    CHIEF COMPLAINT:  Patient is a 58y old  Male who presents with a chief complaint of Cardiogenic shock (03 Dec 2023 20:38)      INTERVAL HISTORY/OVERNIGHT EVENTS:  The patient was seen and examined at bedside.     REVIEW OF SYSTEMS:    [ ] All other systems negative  [ ] Unable to assess ROS because ________.    MEDICATIONS:  MEDICATIONS  (STANDING):  aMIOdarone    Tablet   Oral   aMIOdarone    Tablet 200 milliGRAM(s) Oral daily  aspirin  chewable 81 milliGRAM(s) Oral daily  atorvastatin 80 milliGRAM(s) Oral at bedtime  budesonide  80 MICROgram(s)/formoterol 4.5 MICROgram(s) Inhaler 2 Puff(s) Inhalation two times a day  carvedilol 25 milliGRAM(s) Oral every 12 hours  chlorhexidine 2% Cloths 1 Application(s) Topical daily  heparin  Infusion 1300 Unit(s)/Hr (14 mL/Hr) IV Continuous <Continuous>  hydrALAZINE 100 milliGRAM(s) Oral three times a day  insulin glargine Injectable (LANTUS) 12 Unit(s) SubCutaneous at bedtime  insulin lispro (ADMELOG) corrective regimen sliding scale   SubCutaneous three times a day before meals  insulin lispro (ADMELOG) corrective regimen sliding scale   SubCutaneous at bedtime  insulin lispro Injectable (ADMELOG) 9 Unit(s) SubCutaneous three times a day before meals  isosorbide   dinitrate Tablet (ISORDIL) 40 milliGRAM(s) Oral three times a day  polyethylene glycol 3350 17 Gram(s) Oral two times a day  senna 2 Tablet(s) Oral at bedtime    MEDICATIONS  (PRN):  hydrOXYzine hydrochloride 25 milliGRAM(s) Oral two times a day PRN Anxiety      ALLERGIES:  Allergies    penicillins (Unknown)    Intolerances      OBJECTIVE:  ICU Vital Signs Last 24 Hrs  T(C): 36.9 (04 Dec 2023 03:00), Max: 36.9 (03 Dec 2023 14:00)  T(F): 98.4 (04 Dec 2023 03:00), Max: 98.5 (03 Dec 2023 19:00)  HR: 75 (04 Dec 2023 06:00) (70 - 87)  BP: 113/55 (03 Dec 2023 19:00) (113/55 - 113/55)  BP(mean): --  ABP: --  ABP(mean): --  RR: 14 (04 Dec 2023 06:00) (14 - 27)  SpO2: 98% (04 Dec 2023 06:00) (96% - 99%)    O2 Parameters below as of 04 Dec 2023 06:00  Patient On (Oxygen Delivery Method): room air        Adult Advanced Hemodynamics Last 24 Hrs  CVP(mm Hg): --  CVP(cm H2O): --  CO: --  CI: --  PA: --  PA(mean): --  PCWP: --  SVR: --  SVRI: --  PVR: --  PVRI: --  CAPILLARY BLOOD GLUCOSE      POCT Blood Glucose.: 127 mg/dL (03 Dec 2023 22:06)  POCT Blood Glucose.: 214 mg/dL (03 Dec 2023 17:34)  POCT Blood Glucose.: 124 mg/dL (03 Dec 2023 11:48)  POCT Blood Glucose.: 124 mg/dL (03 Dec 2023 08:18)    CAPILLARY BLOOD GLUCOSE      POCT Blood Glucose.: 127 mg/dL (03 Dec 2023 22:06)    I&O's Summary    03 Dec 2023 07:01  -  04 Dec 2023 07:00  --------------------------------------------------------  IN: 1366 mL / OUT: 2220 mL / NET: -854 mL      Daily     Daily     PHYSICAL EXAMINATION:  General: WN/WD NAD  HEENT: PERRLA, EOMI, moist mucous membranes  Neurology: A&Ox3, nonfocal, MOISE x 4  Respiratory: CTA B/L, normal respiratory effort, no wheezes, crackles, rales  CV: RRR, S1S2, no murmurs, rubs or gallops  Abdominal: Soft, NT, ND +BS, Last BM  Extremities: No edema, + peripheral pulses  Incisions:   Tubes:    LABS:                          11.6   7.33  )-----------( 133      ( 04 Dec 2023 00:40 )             34.7     12-04    133<L>  |  102  |  32<H>  ----------------------------<  160<H>  4.6   |  21<L>  |  1.42<H>    Ca    9.9      04 Dec 2023 00:39  Phos  2.9     12-04  Mg     2.1     12-04    TPro  7.1  /  Alb  3.8  /  TBili  0.2  /  DBili  x   /  AST  48<H>  /  ALT  102<H>  /  AlkPhos  67  12-04    LIVER FUNCTIONS - ( 04 Dec 2023 00:39 )  Alb: 3.8 g/dL / Pro: 7.1 g/dL / ALK PHOS: 67 U/L / ALT: 102 U/L / AST: 48 U/L / GGT: x           PT/INR - ( 04 Dec 2023 00:40 )   PT: 11.6 sec;   INR: 1.06 ratio         PTT - ( 04 Dec 2023 00:40 )  PTT:67.5 sec      Urinalysis Basic - ( 04 Dec 2023 00:39 )    Color: x / Appearance: x / SG: x / pH: x  Gluc: 160 mg/dL / Ketone: x  / Bili: x / Urobili: x   Blood: x / Protein: x / Nitrite: x   Leuk Esterase: x / RBC: x / WBC x   Sq Epi: x / Non Sq Epi: x / Bacteria: x      TELEMETRY:     EKG:     IMAGING:     PATIENT:  DEVORAH VALENCIA  [id number]    CHIEF COMPLAINT:  Patient is a 58y old  Male who presents with a chief complaint of Cardiogenic shock (03 Dec 2023 20:38)      INTERVAL HISTORY/OVERNIGHT EVENTS:  The patient was seen and examined at bedside.     REVIEW OF SYSTEMS:    [ ] All other systems negative  [ ] Unable to assess ROS because ________.    MEDICATIONS:  MEDICATIONS  (STANDING):  aMIOdarone    Tablet   Oral   aMIOdarone    Tablet 200 milliGRAM(s) Oral daily  aspirin  chewable 81 milliGRAM(s) Oral daily  atorvastatin 80 milliGRAM(s) Oral at bedtime  budesonide  80 MICROgram(s)/formoterol 4.5 MICROgram(s) Inhaler 2 Puff(s) Inhalation two times a day  carvedilol 25 milliGRAM(s) Oral every 12 hours  chlorhexidine 2% Cloths 1 Application(s) Topical daily  heparin  Infusion 1300 Unit(s)/Hr (14 mL/Hr) IV Continuous <Continuous>  hydrALAZINE 100 milliGRAM(s) Oral three times a day  insulin glargine Injectable (LANTUS) 12 Unit(s) SubCutaneous at bedtime  insulin lispro (ADMELOG) corrective regimen sliding scale   SubCutaneous three times a day before meals  insulin lispro (ADMELOG) corrective regimen sliding scale   SubCutaneous at bedtime  insulin lispro Injectable (ADMELOG) 9 Unit(s) SubCutaneous three times a day before meals  isosorbide   dinitrate Tablet (ISORDIL) 40 milliGRAM(s) Oral three times a day  polyethylene glycol 3350 17 Gram(s) Oral two times a day  senna 2 Tablet(s) Oral at bedtime    MEDICATIONS  (PRN):  hydrOXYzine hydrochloride 25 milliGRAM(s) Oral two times a day PRN Anxiety      ALLERGIES:  Allergies    penicillins (Unknown)    Intolerances      OBJECTIVE:  ICU Vital Signs Last 24 Hrs  T(C): 36.9 (04 Dec 2023 03:00), Max: 36.9 (03 Dec 2023 14:00)  T(F): 98.4 (04 Dec 2023 03:00), Max: 98.5 (03 Dec 2023 19:00)  HR: 75 (04 Dec 2023 06:00) (70 - 87)  BP: 113/55 (03 Dec 2023 19:00) (113/55 - 113/55)  BP(mean): --  ABP: --  ABP(mean): --  RR: 14 (04 Dec 2023 06:00) (14 - 27)  SpO2: 98% (04 Dec 2023 06:00) (96% - 99%)    O2 Parameters below as of 04 Dec 2023 06:00  Patient On (Oxygen Delivery Method): room air        Adult Advanced Hemodynamics Last 24 Hrs  CVP(mm Hg): --  CVP(cm H2O): --  CO: --  CI: --  PA: --  PA(mean): --  PCWP: --  SVR: --  SVRI: --  PVR: --  PVRI: --  CAPILLARY BLOOD GLUCOSE      POCT Blood Glucose.: 127 mg/dL (03 Dec 2023 22:06)  POCT Blood Glucose.: 214 mg/dL (03 Dec 2023 17:34)  POCT Blood Glucose.: 124 mg/dL (03 Dec 2023 11:48)  POCT Blood Glucose.: 124 mg/dL (03 Dec 2023 08:18)    CAPILLARY BLOOD GLUCOSE      POCT Blood Glucose.: 127 mg/dL (03 Dec 2023 22:06)    I&O's Summary    03 Dec 2023 07:01  -  04 Dec 2023 07:00  --------------------------------------------------------  IN: 1366 mL / OUT: 2220 mL / NET: -854 mL      Daily     Daily     PHYSICAL EXAMINATION:  General: WN/WD NAD  HEENT: PERRLA, EOMI, moist mucous membranes  Neurology: A&Ox3, nonfocal, MOISE x 4  Respiratory: CTA B/L, normal respiratory effort, no wheezes, crackles, rales  CV: RRR, S1S2, no murmurs, rubs or gallops  Abdominal: Soft, NT, ND +BS, Last BM  Extremities: No edema, + peripheral pulses  Incisions:   Tubes:    LABS:                          11.6   7.33  )-----------( 133      ( 04 Dec 2023 00:40 )             34.7     12-04    133<L>  |  102  |  32<H>  ----------------------------<  160<H>  4.6   |  21<L>  |  1.42<H>    Ca    9.9      04 Dec 2023 00:39  Phos  2.9     12-04  Mg     2.1     12-04    TPro  7.1  /  Alb  3.8  /  TBili  0.2  /  DBili  x   /  AST  48<H>  /  ALT  102<H>  /  AlkPhos  67  12-04    LIVER FUNCTIONS - ( 04 Dec 2023 00:39 )  Alb: 3.8 g/dL / Pro: 7.1 g/dL / ALK PHOS: 67 U/L / ALT: 102 U/L / AST: 48 U/L / GGT: x           PT/INR - ( 04 Dec 2023 00:40 )   PT: 11.6 sec;   INR: 1.06 ratio         PTT - ( 04 Dec 2023 00:40 )  PTT:67.5 sec      Urinalysis Basic - ( 04 Dec 2023 00:39 )    Color: x / Appearance: x / SG: x / pH: x  Gluc: 160 mg/dL / Ketone: x  / Bili: x / Urobili: x   Blood: x / Protein: x / Nitrite: x   Leuk Esterase: x / RBC: x / WBC x   Sq Epi: x / Non Sq Epi: x / Bacteria: x      TELEMETRY:     EKG:     IMAGING:     Admission date: 11/1/23  Chief complaint/ Diagnosis: CS  HPI: 59yo M w/ hx HTN, CAD w/ 1 stent in 2009, ICH (2008) presenting with abn ekg. Patient presented to [facility] where he was found to have STEMI, recommended to get cath however patient did not want to get it there so left AMA and presented to [facility] for evaluation.       INTERVAL HISTORY/OVERNIGHT EVENTS:  The patient was seen and examined at bedside.     REVIEW OF SYSTEMS:  all other ROS negative except as per HPI.    MEDICATIONS:  MEDICATIONS  (STANDING):  aMIOdarone    Tablet   Oral   aMIOdarone    Tablet 200 milliGRAM(s) Oral daily  aspirin  chewable 81 milliGRAM(s) Oral daily  atorvastatin 80 milliGRAM(s) Oral at bedtime  budesonide  80 MICROgram(s)/formoterol 4.5 MICROgram(s) Inhaler 2 Puff(s) Inhalation two times a day  carvedilol 25 milliGRAM(s) Oral every 12 hours  chlorhexidine 2% Cloths 1 Application(s) Topical daily  heparin  Infusion 1300 Unit(s)/Hr (14 mL/Hr) IV Continuous <Continuous>  hydrALAZINE 100 milliGRAM(s) Oral three times a day  insulin glargine Injectable (LANTUS) 12 Unit(s) SubCutaneous at bedtime  insulin lispro (ADMELOG) corrective regimen sliding scale   SubCutaneous three times a day before meals  insulin lispro (ADMELOG) corrective regimen sliding scale   SubCutaneous at bedtime  insulin lispro Injectable (ADMELOG) 9 Unit(s) SubCutaneous three times a day before meals  isosorbide   dinitrate Tablet (ISORDIL) 40 milliGRAM(s) Oral three times a day  polyethylene glycol 3350 17 Gram(s) Oral two times a day  senna 2 Tablet(s) Oral at bedtime    MEDICATIONS  (PRN):  hydrOXYzine hydrochloride 25 milliGRAM(s) Oral two times a day PRN Anxiety    ALLERGIES:  Allergies  penicillins (Unknown)    OBJECTIVE:  ICU Vital Signs Last 24 Hrs  T(C): 36.9 (04 Dec 2023 03:00), Max: 36.9 (03 Dec 2023 14:00)  T(F): 98.4 (04 Dec 2023 03:00), Max: 98.5 (03 Dec 2023 19:00)  HR: 75 (04 Dec 2023 06:00) (70 - 87)  BP: 113/55 (03 Dec 2023 19:00) (113/55 - 113/55)  BP(mean): --  RR: 14 (04 Dec 2023 06:00) (14 - 27)  SpO2: 98% (04 Dec 2023 06:00) (96% - 99%)    O2 Parameters below as of 04 Dec 2023 06:00  Patient On (Oxygen Delivery Method): room air    CAPILLARY BLOOD GLUCOSE    POCT Blood Glucose.: 127 mg/dL (03 Dec 2023 22:06)  POCT Blood Glucose.: 214 mg/dL (03 Dec 2023 17:34)  POCT Blood Glucose.: 124 mg/dL (03 Dec 2023 11:48)  POCT Blood Glucose.: 124 mg/dL (03 Dec 2023 08:18)    CAPILLARY BLOOD GLUCOSE    POCT Blood Glucose.: 127 mg/dL (03 Dec 2023 22:06)    I&O's Summary    03 Dec 2023 07:01  -  04 Dec 2023 07:00  --------------------------------------------------------  IN: 1366 mL / OUT: 2220 mL / NET: -854 mL    Daily     PHYSICAL EXAMINATION:  General: WN/WD NAD  HEENT: PERRLA, EOMI, moist mucous membranes  Neurology: A&Ox3, nonfocal, MOISE x 4  Respiratory: CTA B/L, normal respiratory effort, no wheezes  CV: RRR, S1S2, no murmurs, rubs or gallops  Abdominal: Soft, NT, ND +BS, Last BM  Extremities: No edema, 2+ peripheral pulses    LABS:               11.6   7.33  )-----------( 133      ( 04 Dec 2023 00:40 )             34.7     12-04    133<L>  |  102  |  32<H>  ----------------------------<  160<H>  4.6   |  21<L>  |  1.42<H>    Ca    9.9      04 Dec 2023 00:39  Phos  2.9     12-04  Mg     2.1     12-04    TPro  7.1  /  Alb  3.8  /  TBili  0.2  /  DBili  x   /  AST  48<H>  /  ALT  102<H>  /  AlkPhos  67  12-04    LIVER FUNCTIONS - ( 04 Dec 2023 00:39 )  Alb: 3.8 g/dL / Pro: 7.1 g/dL / ALK PHOS: 67 U/L / ALT: 102 U/L / AST: 48 U/L / GGT: x           PT/INR - ( 04 Dec 2023 00:40 )   PT: 11.6 sec;   INR: 1.06 ratio    PTT - ( 04 Dec 2023 00:40 )  PTT:67.5 sec Admission date: 11/1/23  Chief complaint/ Diagnosis: CS  HPI: 59yo M w/ hx HTN, CAD w/ 1 stent in 2009, ICH (2008) presenting with abn ekg. Patient presented to [facility] where he was found to have STEMI, recommended to get cath however patient did not want to get it there so left AMA and presented to [facility] for evaluation.       INTERVAL HISTORY/OVERNIGHT EVENTS:  The patient was seen and examined at bedside.     REVIEW OF SYSTEMS:  all other ROS negative except as per HPI.    MEDICATIONS:  MEDICATIONS  (STANDING):  aMIOdarone    Tablet   Oral   aMIOdarone    Tablet 200 milliGRAM(s) Oral daily  aspirin  chewable 81 milliGRAM(s) Oral daily  atorvastatin 80 milliGRAM(s) Oral at bedtime  budesonide  80 MICROgram(s)/formoterol 4.5 MICROgram(s) Inhaler 2 Puff(s) Inhalation two times a day  carvedilol 25 milliGRAM(s) Oral every 12 hours  chlorhexidine 2% Cloths 1 Application(s) Topical daily  heparin  Infusion 1300 Unit(s)/Hr (14 mL/Hr) IV Continuous <Continuous>  hydrALAZINE 100 milliGRAM(s) Oral three times a day  insulin glargine Injectable (LANTUS) 12 Unit(s) SubCutaneous at bedtime  insulin lispro (ADMELOG) corrective regimen sliding scale   SubCutaneous three times a day before meals  insulin lispro (ADMELOG) corrective regimen sliding scale   SubCutaneous at bedtime  insulin lispro Injectable (ADMELOG) 9 Unit(s) SubCutaneous three times a day before meals  isosorbide   dinitrate Tablet (ISORDIL) 40 milliGRAM(s) Oral three times a day  polyethylene glycol 3350 17 Gram(s) Oral two times a day  senna 2 Tablet(s) Oral at bedtime    MEDICATIONS  (PRN):  hydrOXYzine hydrochloride 25 milliGRAM(s) Oral two times a day PRN Anxiety    ALLERGIES:  Allergies  penicillins (Unknown)    OBJECTIVE:  ICU Vital Signs Last 24 Hrs  T(C): 36.9 (04 Dec 2023 03:00), Max: 36.9 (03 Dec 2023 14:00)  T(F): 98.4 (04 Dec 2023 03:00), Max: 98.5 (03 Dec 2023 19:00)  HR: 75 (04 Dec 2023 06:00) (70 - 87)  BP: 113/55 (03 Dec 2023 19:00) (113/55 - 113/55)  BP(mean): --  RR: 14 (04 Dec 2023 06:00) (14 - 27)  SpO2: 98% (04 Dec 2023 06:00) (96% - 99%)    O2 Parameters below as of 04 Dec 2023 06:00  Patient On (Oxygen Delivery Method): room air    CAPILLARY BLOOD GLUCOSE    POCT Blood Glucose.: 127 mg/dL (03 Dec 2023 22:06)  POCT Blood Glucose.: 214 mg/dL (03 Dec 2023 17:34)  POCT Blood Glucose.: 124 mg/dL (03 Dec 2023 11:48)  POCT Blood Glucose.: 124 mg/dL (03 Dec 2023 08:18)    CAPILLARY BLOOD GLUCOSE    POCT Blood Glucose.: 127 mg/dL (03 Dec 2023 22:06)    I&O's Summary    03 Dec 2023 07:01  -  04 Dec 2023 07:00  --------------------------------------------------------  IN: 1366 mL / OUT: 2220 mL / NET: -854 mL    Daily     PHYSICAL EXAMINATION:  General: WN/WD NAD  HEENT: PERRLA, EOMI, moist mucous membranes  Neurology: A&Ox3, nonfocal, MOISE x 4  Respiratory: CTA B/L, normal respiratory effort, no wheezes  CV: RRR, S1S2, no murmurs, rubs or gallops  Abdominal: Soft, NT, ND +BS, Last BM  Extremities: No edema, 2+ peripheral pulses    LABS:               11.6   7.33  )-----------( 133      ( 04 Dec 2023 00:40 )             34.7     12-04    133<L>  |  102  |  32<H>  ----------------------------<  160<H>  4.6   |  21<L>  |  1.42<H>    Ca    9.9      04 Dec 2023 00:39  Phos  2.9     12-04  Mg     2.1     12-04    TPro  7.1  /  Alb  3.8  /  TBili  0.2  /  DBili  x   /  AST  48<H>  /  ALT  102<H>  /  AlkPhos  67  12-04    LIVER FUNCTIONS - ( 04 Dec 2023 00:39 )  Alb: 3.8 g/dL / Pro: 7.1 g/dL / ALK PHOS: 67 U/L / ALT: 102 U/L / AST: 48 U/L / GGT: x           PT/INR - ( 04 Dec 2023 00:40 )   PT: 11.6 sec;   INR: 1.06 ratio    PTT - ( 04 Dec 2023 00:40 )  PTT:67.5 sec

## 2023-12-04 NOTE — PROGRESS NOTE ADULT - ATTENDING COMMENTS
History of anxiety disorder and CAD s/p PCI  Admitted with cardiogenic shock and respiratory failure in the setting of stent restenosis  Transferred out and had VT/VF cardiac arrest  Readmitted to CICU  A+Ox3, anxious - continue Atarax prn  Cardiogenic shock requiring IABP, also on Bidil for afterload reduction - will maintain  S/p VT/VF arrest on Amiodarone PO and Coreg  SpO2 mid 90s on room air  Soft diet  Likely CKD post arrest, compensated on exam, hyponatremic, not overloaded on exam  Continue to hold diuretics  H/H low but acceptable on Heparin drip for LV thrombus and IABP  Afebrile, no antibiotics, s/p course of Vancomycin for enterococcus bacteremia, now cleared   Sugars controlled on Lantus  IABP 11/20

## 2023-12-04 NOTE — PROGRESS NOTE ADULT - ATTENDING COMMENTS
# ARIC/ATN - serum creatinine peaked at 4, and now reaching a plateau of 1.2-1.4mg/dL. We will continue to monitor.     # Hyponatremia - Urine osm 471 and urine Na 88. Serum sodium improved from 129 to 133 gradually through fluid restriction and hypertonic saline (11/24).       The rest of the recommendations as per fellow's note.    Suzanne Jackson MD  Attending Nephrologist  [phone] or via [employer] # ARIC/ATN - serum creatinine peaked at 4, and now reaching a plateau of 1.2-1.4mg/dL. We will continue to monitor.     # Hyponatremia - Urine osm 471 and urine Na 88. Serum sodium improved from 129 to 133 gradually through fluid restriction and hypertonic saline (11/24).       The rest of the recommendations as per fellow's note.    Suzanne Jackson MD  Attending Nephrologist  [phone] or via [employer]

## 2023-12-04 NOTE — PROGRESS NOTE ADULT - ASSESSMENT
59 yo M with PMHx of HTN, CAD w/ 1 stent in 2009, ICH (2008) presented on 11/1 with abn ekg. Patient presented to [facility] where he was found to have STEMI, recommended to get cath however patient did not want to get it there so he left and came here.   EKG here with ST depression in lateral leads and elevation in anterior leads   Prior to [facility] found to be tachycardic, dyspneic, intubated   C 11/1 with chronic total occlusion of LAD and RCA, with elevated RA and PA pressures  TTE 11/1 with severely decreased EF 32%, s/p IABP 11/1    RVP (11/1) Positive for COVID19  RVP (11/10) Negative  BCx (11/2, 11/4) NGTD  ETT Culture (11/2) NGTD  MRSA/MSSA PCR (11/2, 11/10) Negative  UA (11/10) 1 WBC    CXR (11/10) Clear Lungs  TTE (11/2) Left ventricular thrombus.    #Positive Blood Cultures (11/30 - [facility])  Unclear if contaminant or true pathogen  Growth this far from procurement raises question of contaminant  However, did have leukocytosis on the day prior  --Recommend 2x repeat blood cultures  --Recommend addition of Vancomycin  --Would Status 7 with respect to transplant pending 48H on repeat blood cultures and ID of the organism  --Continue to follow CBC with diff  --Continue to follow temperature curve    #Rash  ?Secondary to Cefepime on 11/2?  ?Secondary to hepatitis vaccine?  ?Secondary to Vancomycin  --Consider Dermatology Consult  --Follow LFT's and CBC with Diff (For Eosinophil Counts)    #Enterococcus Bacteremia, Leukocytosis, Pre-Heart Transplant Evaluation Serologies  Concern for either central line or urinary source  CT A/P Noncontrast (11/18) No acute process  s/p ten-days of IV Vancomycin last dose received on 11/27    #Encounter to Vaccinate Patient  COVID19: COVID19 Infection This Admission. Would consider Vaccination in ~3 months  Influenza: declined  Pneumococcal: Would benefit from PCV20  HAV: received first dose 11/24  HBV: received first dose Heplisav 11/24  MMR: Not Mumps Immune, if >1 month until transplant would consider MMR dose  Varicella: Immune  Shingles: recommend Shingrix series  Tdap: received Tdap booster this admission    I will continue to follow. Please feel free to contact me with any further questions.    Silviano Manuel M.D.  [facility] Division of Infectious Disease  8AM-5PM Monday - Friday: Available on [employer]  After Hours and Holidays (or if no response on [employer] Teams): Please contact the Infectious Diseases Office at [phone]    The above assessment and plan were discussed with CICU Team and Heart Failure Attending 57 yo M with PMHx of HTN, CAD w/ 1 stent in 2009, ICH (2008) presented on 11/1 with abn ekg. Patient presented to [facility] where he was found to have STEMI, recommended to get cath however patient did not want to get it there so he left and came here.   EKG here with ST depression in lateral leads and elevation in anterior leads   Prior to [facility] found to be tachycardic, dyspneic, intubated   C 11/1 with chronic total occlusion of LAD and RCA, with elevated RA and PA pressures  TTE 11/1 with severely decreased EF 32%, s/p IABP 11/1    RVP (11/1) Positive for COVID19  RVP (11/10) Negative  BCx (11/2, 11/4) NGTD  ETT Culture (11/2) NGTD  MRSA/MSSA PCR (11/2, 11/10) Negative  UA (11/10) 1 WBC    CXR (11/10) Clear Lungs  TTE (11/2) Left ventricular thrombus.    #Positive Blood Cultures (11/30 - [facility])  Unclear if contaminant or true pathogen  Growth this far from procurement raises question of contaminant  However, did have leukocytosis on the day prior  --Recommend 2x repeat blood cultures  --Recommend addition of Vancomycin  --Would Status 7 with respect to transplant pending 48H on repeat blood cultures and ID of the organism  --Continue to follow CBC with diff  --Continue to follow temperature curve    #Rash  ?Secondary to Cefepime on 11/2?  ?Secondary to hepatitis vaccine?  ?Secondary to Vancomycin  --Consider Dermatology Consult  --Follow LFT's and CBC with Diff (For Eosinophil Counts)    #Enterococcus Bacteremia, Leukocytosis, Pre-Heart Transplant Evaluation Serologies  Concern for either central line or urinary source  CT A/P Noncontrast (11/18) No acute process  s/p ten-days of IV Vancomycin last dose received on 11/27    #Encounter to Vaccinate Patient  COVID19: COVID19 Infection This Admission. Would consider Vaccination in ~3 months  Influenza: declined  Pneumococcal: Would benefit from PCV20  HAV: received first dose 11/24  HBV: received first dose Heplisav 11/24  MMR: Not Mumps Immune, if >1 month until transplant would consider MMR dose  Varicella: Immune  Shingles: recommend Shingrix series  Tdap: received Tdap booster this admission    I will continue to follow. Please feel free to contact me with any further questions.    Silviano Manuel M.D.  [facility] Division of Infectious Disease  8AM-5PM Monday - Friday: Available on [employer]  After Hours and Holidays (or if no response on [employer] Teams): Please contact the Infectious Diseases Office at [phone]    The above assessment and plan were discussed with CICU Team and Heart Failure Attending

## 2023-12-04 NOTE — PROGRESS NOTE ADULT - SUBJECTIVE AND OBJECTIVE BOX
DEVORAH VALENCIA  MRN-[id number]  Patient is a 58y old  Male who presents with a chief complaint of cardiogenic shock (04 Dec 2023 07:37)    HPI:  pt seen and approx 1:30 pm  in [facility]    59yo M w/ hx HTN, CAD w/ 1 stent in , ICH () presenting with abn ekg. Patient presented to [facility] where he was found to have STEMI, recommended to get cath however patient did not want to get it there so it left and came here.  Patient initially had cough, congestion, fever, was placed on antibiotics on .  Started feeling nauseous and had a presyncopal event after which he presented to ED last night.  Had chest pain as well.  Chest pain is midsternal.  Not currently having chest pain.  Received 4 aspirin 30 min pta. (2023 15:11)      Hospital Course:    24 HOUR EVENTS:    REVIEW OF SYSTEMS:    CONSTITUTIONAL: No weakness, fevers or chills  EYES/ENT: No visual changes;  No vertigo or throat pain   NECK: No pain or stiffness  RESPIRATORY: No cough, wheezing, hemoptysis; No shortness of breath  CARDIOVASCULAR: No chest pain or palpitations  GASTROINTESTINAL: No abdominal or epigastric pain. No nausea, vomiting, or hematemesis; No diarrhea or constipation. No melena or hematochezia.  GENITOURINARY: No dysuria, frequency or hematuria  NEUROLOGICAL: No numbness or weakness  SKIN: No itching, rashes      ICU Vital Signs Last 24 Hrs  T(C): 36.9 (04 Dec 2023 16:00), Max: 36.9 (03 Dec 2023 23:00)  T(F): 98.4 (04 Dec 2023 16:00), Max: 98.5 (03 Dec 2023 23:00)  HR: 79 (04 Dec 2023 20:00) (71 - 83)  BP: --  BP(mean): --  ABP: --  ABP(mean): --  RR: 17 (04 Dec 2023 20:00) (13 - 25)  SpO2: 100% (04 Dec 2023 20:00) (97% - 100%)    O2 Parameters below as of 04 Dec 2023 19:00  Patient On (Oxygen Delivery Method): room air            CVP(mm Hg): --  CO: --  CI: --  PA: --  PA(mean): --  PA(direct): --  PCWP: --  LA: --  RA: --  SVR: --  SVRI: --  PVR: --  PVRI: --  I&O's Summary    03 Dec 2023 07:01  -  04 Dec 2023 07:00  --------------------------------------------------------  IN: 1366 mL / OUT: 2220 mL / NET: -854 mL    04 Dec 2023 07:01  -  04 Dec 2023 20:03  --------------------------------------------------------  IN: 1017 mL / OUT: 400 mL / NET: 617 mL        CAPILLARY BLOOD GLUCOSE    CAPILLARY BLOOD GLUCOSE      POCT Blood Glucose.: 187 mg/dL (04 Dec 2023 16:45)      PHYSICAL EXAM:  GENERAL: No acute distress, well-developed  HEAD:  Atraumatic, Normocephalic  EYES: EOMI, PERRLA, conjunctiva and sclera clear  NECK: Supple, no lymphadenopathy, no JVD  CHEST/LUNG: CTAB; No wheezes, rales, or rhonchi  HEART: Regular rate and rhythm. Normal S1/S2. No murmurs, rubs, or gallops  ABDOMEN: Soft, non-tender, non-distended; normal bowel sounds, no organomegaly  EXTREMITIES:  2+ peripheral pulses b/l, No clubbing, cyanosis, or edema  NEUROLOGY: A&O x 3, no focal deficits  SKIN: No rashes or lesions    ============================I/O===========================   I&O's Detail    03 Dec 2023 07:01  -  04 Dec 2023 07:00  --------------------------------------------------------  IN:    Heparin: 336 mL    Oral Fluid: 1030 mL  Total IN: 1366 mL    OUT:    Voided (mL): 2220 mL  Total OUT: 2220 mL    Total NET: -854 mL      04 Dec 2023 07:01  -  04 Dec 2023 20:03  --------------------------------------------------------  IN:    Heparin: 182 mL    IV PiggyBack: 275 mL    Oral Fluid: 560 mL  Total IN: 1017 mL    OUT:    Voided (mL): 400 mL  Total OUT: 400 mL    Total NET: 617 mL        ============================ LABS =========================                        11.6   7.33  )-----------( 133      ( 04 Dec 2023 00:40 )             34.7     12-04    132<L>  |  102  |  26<H>  ----------------------------<  201<H>  3.8   |  19<L>  |  1.12    Ca    9.5      04 Dec 2023 14:42  Phos  2.9     12-04  Mg     1.8     12-    TPro  6.5  /  Alb  3.6  /  TBili  0.2  /  DBili  x   /  AST  40  /  ALT  97<H>  /  AlkPhos  59  12-04                LIVER FUNCTIONS - ( 04 Dec 2023 14:42 )  Alb: 3.6 g/dL / Pro: 6.5 g/dL / ALK PHOS: 59 U/L / ALT: 97 U/L / AST: 40 U/L / GGT: x           PT/INR - ( 04 Dec 2023 00:40 )   PT: 11.6 sec;   INR: 1.06 ratio         PTT - ( 04 Dec 2023 00:40 )  PTT:67.5 sec      Urinalysis Basic - ( 04 Dec 2023 14:42 )    Color: x / Appearance: x / SG: x / pH: x  Gluc: 201 mg/dL / Ketone: x  / Bili: x / Urobili: x   Blood: x / Protein: x / Nitrite: x   Leuk Esterase: x / RBC: x / WBC x   Sq Epi: x / Non Sq Epi: x / Bacteria: x      ======================Micro/Rad/Cardio=================  Telemtry: Reviewed   EKG: Reviewed  CXR: Reviewed  Culture: Reviewed   Echo:   Cath: Cardiac Cath Lab - Adult:   [facility]  Department of Cardiology  [address] [place] [address]  [phone]  Cath Lab Report -- Comprehensive Report  Patient: LD VALENCIA  Study date: 2017  Account number: [id number]  MR number: [id number]  : 1965  Gender: Male  Race: W  Case Physician(s):  Jairon Holguin M.D.  Referring Physician:  Luc Lynn M.D.  INDICATIONS: Unstable angina - CCS4.  HISTORY: The patient has a history of coronary artery disease. The patient  hashypertension and medication-treated dyslipidemia.  PROCEDURE:  --  Left heart catheterization with ventriculography.  --  Left coronary angiography.  --  Right coronary angiography.  TECHNIQUE: The risks and alternatives of the procedures and conscious  sedation were explained to the patient and informed consent was obtained.  Cardiac catheterization performed electively.  Local anesthetic given. Right radial artery access. A 6FR PRELUDE KIT was  inserted in the vessel. Left heart catheterization. Ventriculography was  performed. A 5FR FR4.0 EXPO catheter was utilized. Left coronary artery  angiography. The vessel was injected utilizing a 5FR FL3.5 EXPO catheter.  Right coronary artery angiography. The vessel was injected utilizing a 5FR  FR4.0 EXPO catheter. RADIATION EXPOSURE: 1.1 min.  CONTRAST GIVEN: Omnipaque 55 ml.  MEDICATIONS GIVEN: Midazolam, 1 mg, IV. Fentanyl, 25 mcg, IV. Verapamil  (Isoptin, Calan, Covera), 2.5 mg, IA. Heparin, 3000 units, IA.  VENTRICLES: Global left ventricular function was moderately depressed. EF  estimated was 40 %.  CORONARY VESSELS: The coronary circulation is right dominant.  LM:   --  LM: Normal.  LAD:   --  Proximal LAD: There was a 50 % stenosis.  CX:   --  Circumflex: Normal.  RCA:   --  Mid RCA: There was a 40 % stenosis.  --  Distal RCA: There was a 50 % stenosis.  COMPLICATIONS: There were no complications.  DIAGNOSTIC RECOMMENDATIONS: The patient should continue with the present  medications.  Prepared and signed by  Jairon Holguin M.D.  Signed 2017 12:20:13  HEMODYNAMIC TABLES  Pressures:  Baseline/ Room Air  Pressures:  - HR: 78  Pressures:  - Rhythm:  Pressures:  -- Aortic Pressure (S/D/M): --/--/99  Pressures:  -- Left Ventricle (s/edp): 157/39/--  Outputs:  Baseline/ Room Air  Outputs:  -- CALCULATIONS[age]  Outputs:  -- CALCULATIONS: Body Surface Area: 2.05  Outputs:  -- CALCULATIONS: Height in cm: 175.00  Outputs:  -- CALCULATIONS: Sex: Male  Outputs:  -- CALCULATIONS: Weight in k.40 (17 @ 21:55)    ======================================================  PAST MEDICAL & SURGICAL HISTORY:  HTN (hypertension)      CAD (coronary artery disease)  ; stent      Intracranial hemorrhage        Respiratory arrest        Myocardial infarction, unspecified MI type, unspecified artery      History of coronary artery stent placement  ====================ASSESSMENT ==============  58 male with HTN, CAD (s/p PCI ), HFrEF, CVA , and T2DM presenting with chest pressure and unknown tachycardia that was shocked x1, [facility]  found to have in-stent restenosis of pLAD and  of RCA with elevated RA and PA pressures and severely decreased. Admitted to CICU for management of cardiogenic shock and ADHF requiring IABP  -, with hospital course c/b vfib arrest requiring reinsertion of IABP. Currently listed for transplant status 2.    Plan:  ====================== NEUROLOGY=====================  Anxiety  - No Seroquel/antipsychotics since vfib arrest and prolonged QTC  - Psych Eval, recommended SSRI, but pt. refused   - Psych recommending atarax PRN  - Continue to monitor mental status    PT/Conditioning  - Continue band exercised while on bedrest s/t IABP    ==================== RESPIRATORY======================  Acute Hypoxemic Respiratory Failure  - s/p x2 intubations for cardiogenic pulm edema and the in setting of cardiac arrest, resolved - extubated 11/10  - Currently on room air with spO2 mid 90s  - Continue incentive spirometry and monitoring of sp02    Asthma  - c/w albuterol, symbicort and spiriva  - On trelegy at home  - Continue to monitor SpO2 with goal >94%    ====================CARDIOVASCULAR==================  Vfib arrest i/s/o ischemia  - Lido gtt off   - PO Amio load - total of 5g per EP complete , continue PO amio  - Keep K > 4, Mag > 2.2     Cardiogenic shock requiring IABP (- , -)  - Likely 2/2 NSTEMI and ADHF  -  LHC: pLAD 100 % in-stent restenosis & mRCA, 100 %. PCWP 30. IABP placed.  -  TTE: LV dilated. EF 32 %. Regional WMAs present, mod (grade 2) LV diastolic dysfunction  -  TTE: EF 22% and + LV thrombus  -  s/p 500 cc bolus  - IABP swapped  to RFA, continue 1:1 support  - Off Milrinone gtt @ 7:30 am   - Currently on hydralazine 100 TID and ISD 40 TID for AL reduction  - [place] d/c'd due to elevated K levels  - c/w coreg 25 BID for GDMT  - Listed OHT status 2 , f/u HF recs  - HIT and HAIDER sent (12/3) as per HF     NSTEMI iso stent re-occlusion of pLAD and 100%  of RCA  - EKG on admission w/ LBBB  - DAPT: c/w ASA, Brilinta d/c'd per transplant w/u  - c/w lipitor 80  - cMR deferred given necessity of IABP  - CT sx not recommending CABG, undergoing AT eval    LV thrombus  - c/w heparin gtt    ===================== RENAL =========================  Non-oliguric ARIC   - Baseline Cr:   - Renal US: no evidence of renal artery stenosis  - Trend BMP, lytes daily, replace as needed  - Continue Strict I/Os, avoid nephrotoxins    Mild Hyponatremia/Hyperkalemia iso ARIC and or new CKD baseline  - Continue fluid restriction   - Urea powder d/c'd per renal  - Trend daily  - Continue monitoring urine output, lytes, SCr/ BUN  - Replete lytes prn with goal K >4 and Mg >2    =============== GASTROINTESTINAL===================  Constipation/ileus, resolved  - s/p multiple BMs, symptoms improving   - c/w diet    ===================ENDO====================  Type 2 DM  - A1c 8.3  - Continue lantus, premeal, low ISS  - continue FS    ===================HEMATOLOGIC/ONC ===================  - H/H & plts stable  - Monitor H/H and plts  - VTE PPX: heparin gtt    ==================INFECTIOUS DISEASE================  # Leukocytosis  - Repeat BCx pending  - Monitor off abx at this time  - Monitor and trend WBC and temperature curve     # Enterococcus faecalis bacteremia, resolved  - BCx + for enterococcus faecalis x2, Staph epi x1 (likely contaminant)- pan sensitive   - Urine cx  + enterococcus faecalis  - BCx  no growth; repeat  NGTD  - IABP site swapped to RFA   - s/p Vancomycin 1g q12h (-)  - CT A/P negative for infectious pathology    # COVID, resolved  - Off airborne precautions     # Pre-transplant ID w/u   - Trend ID recs for serologies   - Colonoscopy  - normal   - Chest CT  - improved LLL aeration  - s/p immunizations      Patient requires continuous monitoring with bedside rhythm monitoring, pulse ox monitoring, and intermittent blood gas analysis. Care plan discussed with ICU care team. Patient remained critical and at risk for life threatening decompensation.  Patient seen, examined and plan discussed with CCU team during rounds.     I have personally provided ____ minutes of critical care time excluding time spent on separate procedures, in addition to initial critical care time provided by the CICU Attending, Dr. Lees.    By signing my name below, I, Kalyn Sousa, attest that this documentation has been prepared under the direction and in the presence of KARYN Choi.  Electronically signed: Rosalba Booth, 23 @ 20:03    I, Niurka Brizuela , personally performed the services described in this documentation. all medical record entries made by the scribe were at my direction and in my presence. I have reviewed the chart and agree that the record reflects my personal performance and is accurate and complete  Electronically signed: KARYN Choi.     DEVORAH VALENCIA  MRN-[id number]  Patient is a 58y old  Male who presents with a chief complaint of cardiogenic shock (04 Dec 2023 07:37)    HPI:  pt seen and approx 1:30 pm  in [facility]    59yo M w/ hx HTN, CAD w/ 1 stent in , ICH () presenting with abn ekg. Patient presented to [facility] where he was found to have STEMI, recommended to get cath however patient did not want to get it there so it left and came here.  Patient initially had cough, congestion, fever, was placed on antibiotics on .  Started feeling nauseous and had a presyncopal event after which he presented to ED last night.  Had chest pain as well.  Chest pain is midsternal.  Not currently having chest pain.  Received 4 aspirin 30 min pta. (2023 15:11)      Hospital Course:    24 HOUR EVENTS:    REVIEW OF SYSTEMS:    CONSTITUTIONAL: No weakness, fevers or chills  EYES/ENT: No visual changes;  No vertigo or throat pain   NECK: No pain or stiffness  RESPIRATORY: No cough, wheezing, hemoptysis; No shortness of breath  CARDIOVASCULAR: No chest pain or palpitations  GASTROINTESTINAL: No abdominal or epigastric pain. No nausea, vomiting, or hematemesis; No diarrhea or constipation. No melena or hematochezia.  GENITOURINARY: No dysuria, frequency or hematuria  NEUROLOGICAL: No numbness or weakness  SKIN: No itching, rashes      ICU Vital Signs Last 24 Hrs  T(C): 36.9 (04 Dec 2023 16:00), Max: 36.9 (03 Dec 2023 23:00)  T(F): 98.4 (04 Dec 2023 16:00), Max: 98.5 (03 Dec 2023 23:00)  HR: 79 (04 Dec 2023 20:00) (71 - 83)  BP: --  BP(mean): --  ABP: --  ABP(mean): --  RR: 17 (04 Dec 2023 20:00) (13 - 25)  SpO2: 100% (04 Dec 2023 20:00) (97% - 100%)    O2 Parameters below as of 04 Dec 2023 19:00  Patient On (Oxygen Delivery Method): room air            CVP(mm Hg): --  CO: --  CI: --  PA: --  PA(mean): --  PA(direct): --  PCWP: --  LA: --  RA: --  SVR: --  SVRI: --  PVR: --  PVRI: --  I&O's Summary    03 Dec 2023 07:01  -  04 Dec 2023 07:00  --------------------------------------------------------  IN: 1366 mL / OUT: 2220 mL / NET: -854 mL    04 Dec 2023 07:01  -  04 Dec 2023 20:03  --------------------------------------------------------  IN: 1017 mL / OUT: 400 mL / NET: 617 mL        CAPILLARY BLOOD GLUCOSE    CAPILLARY BLOOD GLUCOSE      POCT Blood Glucose.: 187 mg/dL (04 Dec 2023 16:45)      PHYSICAL EXAM:  GENERAL: No acute distress, well-developed  HEAD:  Atraumatic, Normocephalic  EYES: EOMI, PERRLA, conjunctiva and sclera clear  NECK: Supple, no lymphadenopathy, no JVD  CHEST/LUNG: CTAB; No wheezes, rales, or rhonchi  HEART: Regular rate and rhythm. Normal S1/S2. No murmurs, rubs, or gallops  ABDOMEN: Soft, non-tender, non-distended; normal bowel sounds, no organomegaly  EXTREMITIES:  2+ peripheral pulses b/l, No clubbing, cyanosis, or edema  NEUROLOGY: A&O x 3, no focal deficits  SKIN: No rashes or lesions    ============================I/O===========================   I&O's Detail    03 Dec 2023 07:01  -  04 Dec 2023 07:00  --------------------------------------------------------  IN:    Heparin: 336 mL    Oral Fluid: 1030 mL  Total IN: 1366 mL    OUT:    Voided (mL): 2220 mL  Total OUT: 2220 mL    Total NET: -854 mL      04 Dec 2023 07:01  -  04 Dec 2023 20:03  --------------------------------------------------------  IN:    Heparin: 182 mL    IV PiggyBack: 275 mL    Oral Fluid: 560 mL  Total IN: 1017 mL    OUT:    Voided (mL): 400 mL  Total OUT: 400 mL    Total NET: 617 mL        ============================ LABS =========================                        11.6   7.33  )-----------( 133      ( 04 Dec 2023 00:40 )             34.7     12-04    132<L>  |  102  |  26<H>  ----------------------------<  201<H>  3.8   |  19<L>  |  1.12    Ca    9.5      04 Dec 2023 14:42  Phos  2.9     12-04  Mg     1.8     12-    TPro  6.5  /  Alb  3.6  /  TBili  0.2  /  DBili  x   /  AST  40  /  ALT  97<H>  /  AlkPhos  59  12-04                LIVER FUNCTIONS - ( 04 Dec 2023 14:42 )  Alb: 3.6 g/dL / Pro: 6.5 g/dL / ALK PHOS: 59 U/L / ALT: 97 U/L / AST: 40 U/L / GGT: x           PT/INR - ( 04 Dec 2023 00:40 )   PT: 11.6 sec;   INR: 1.06 ratio         PTT - ( 04 Dec 2023 00:40 )  PTT:67.5 sec      Urinalysis Basic - ( 04 Dec 2023 14:42 )    Color: x / Appearance: x / SG: x / pH: x  Gluc: 201 mg/dL / Ketone: x  / Bili: x / Urobili: x   Blood: x / Protein: x / Nitrite: x   Leuk Esterase: x / RBC: x / WBC x   Sq Epi: x / Non Sq Epi: x / Bacteria: x      ======================Micro/Rad/Cardio=================  Telemtry: Reviewed   EKG: Reviewed  CXR: Reviewed  Culture: Reviewed   Echo:   Cath: Cardiac Cath Lab - Adult:   [facility]  Department of Cardiology  [address] [place] [address]  [phone]  Cath Lab Report -- Comprehensive Report  Patient: LD VALENCIA  Study date: 2017  Account number: [id number]  MR number: [id number]  : 1965  Gender: Male  Race: W  Case Physician(s):  Jairon Holguin M.D.  Referring Physician:  Luc Lynn M.D.  INDICATIONS: Unstable angina - CCS4.  HISTORY: The patient has a history of coronary artery disease. The patient  hashypertension and medication-treated dyslipidemia.  PROCEDURE:  --  Left heart catheterization with ventriculography.  --  Left coronary angiography.  --  Right coronary angiography.  TECHNIQUE: The risks and alternatives of the procedures and conscious  sedation were explained to the patient and informed consent was obtained.  Cardiac catheterization performed electively.  Local anesthetic given. Right radial artery access. A 6FR PRELUDE KIT was  inserted in the vessel. Left heart catheterization. Ventriculography was  performed. A 5FR FR4.0 EXPO catheter was utilized. Left coronary artery  angiography. The vessel was injected utilizing a 5FR FL3.5 EXPO catheter.  Right coronary artery angiography. The vessel was injected utilizing a 5FR  FR4.0 EXPO catheter. RADIATION EXPOSURE: 1.1 min.  CONTRAST GIVEN: Omnipaque 55 ml.  MEDICATIONS GIVEN: Midazolam, 1 mg, IV. Fentanyl, 25 mcg, IV. Verapamil  (Isoptin, Calan, Covera), 2.5 mg, IA. Heparin, 3000 units, IA.  VENTRICLES: Global left ventricular function was moderately depressed. EF  estimated was 40 %.  CORONARY VESSELS: The coronary circulation is right dominant.  LM:   --  LM: Normal.  LAD:   --  Proximal LAD: There was a 50 % stenosis.  CX:   --  Circumflex: Normal.  RCA:   --  Mid RCA: There was a 40 % stenosis.  --  Distal RCA: There was a 50 % stenosis.  COMPLICATIONS: There were no complications.  DIAGNOSTIC RECOMMENDATIONS: The patient should continue with the present  medications.  Prepared and signed by  Jairon Holguin M.D.  Signed 2017 12:20:13  HEMODYNAMIC TABLES  Pressures:  Baseline/ Room Air  Pressures:  - HR: 78  Pressures:  - Rhythm:  Pressures:  -- Aortic Pressure (S/D/M): --/--/99  Pressures:  -- Left Ventricle (s/edp): 157/39/--  Outputs:  Baseline/ Room Air  Outputs:  -- CALCULATIONS[age]  Outputs:  -- CALCULATIONS: Body Surface Area: 2.05  Outputs:  -- CALCULATIONS: Height in cm: 175.00  Outputs:  -- CALCULATIONS: Sex: Male  Outputs:  -- CALCULATIONS: Weight in k.40 (17 @ 21:55)    ======================================================  PAST MEDICAL & SURGICAL HISTORY:  HTN (hypertension)      CAD (coronary artery disease)  ; stent      Intracranial hemorrhage        Respiratory arrest        Myocardial infarction, unspecified MI type, unspecified artery      History of coronary artery stent placement  ====================ASSESSMENT ==============  58 male with HTN, CAD (s/p PCI ), HFrEF, CVA , and T2DM presenting with chest pressure and unknown tachycardia that was shocked x1, [facility]  found to have in-stent restenosis of pLAD and  of RCA with elevated RA and PA pressures and severely decreased. Admitted to CICU for management of cardiogenic shock and ADHF requiring IABP  -, with hospital course c/b vfib arrest requiring reinsertion of IABP. Currently listed for transplant status 2.    Plan:  ====================== NEUROLOGY=====================  Anxiety  - No Seroquel/antipsychotics since vfib arrest and prolonged QTC  - Psych Eval, recommended SSRI, but pt. refused   - Psych recommending atarax PRN  - Continue to monitor mental status    PT/Conditioning  - Continue band exercised while on bedrest s/t IABP    ==================== RESPIRATORY======================  Acute Hypoxemic Respiratory Failure  - s/p x2 intubations for cardiogenic pulm edema and the in setting of cardiac arrest, resolved - extubated 11/10  - Currently on room air with spO2 mid 90s  - Continue incentive spirometry and monitoring of sp02    Asthma  - c/w albuterol, symbicort and spiriva  - On trelegy at home  - Continue to monitor SpO2 with goal >94%    ====================CARDIOVASCULAR==================  Vfib arrest i/s/o ischemia  - Lido gtt off   - PO Amio load - total of 5g per EP complete , continue PO amio  - Keep K > 4, Mag > 2.2     Cardiogenic shock requiring IABP (- , -)  - Likely 2/2 NSTEMI and ADHF  -  LHC: pLAD 100 % in-stent restenosis & mRCA, 100 %. PCWP 30. IABP placed.  -  TTE: LV dilated. EF 32 %. Regional WMAs present, mod (grade 2) LV diastolic dysfunction  -  TTE: EF 22% and + LV thrombus  -  s/p 500 cc bolus  - IABP swapped  to RFA, continue 1:1 support  - Off Milrinone gtt @ 7:30 am   - Currently on hydralazine 100 TID and ISD 40 TID for AL reduction  - [place] d/c'd due to elevated K levels  - c/w coreg 25 BID for GDMT  - Listed OHT status 2 , f/u HF recs  - HIT and HAIDER sent (12/3) as per HF     NSTEMI iso stent re-occlusion of pLAD and 100%  of RCA  - EKG on admission w/ LBBB  - DAPT: c/w ASA, Brilinta d/c'd per transplant w/u  - c/w lipitor 80  - cMR deferred given necessity of IABP  - CT sx not recommending CABG, undergoing AT eval    LV thrombus  - c/w heparin gtt    ===================== RENAL =========================  Non-oliguric ARIC   - Baseline Cr:   - Renal US: no evidence of renal artery stenosis  - Trend BMP, lytes daily, replace as needed  - Continue Strict I/Os, avoid nephrotoxins    Mild Hyponatremia/Hyperkalemia iso ARIC and or new CKD baseline  - Continue fluid restriction   - Urea powder d/c'd per renal  - Trend daily  - Continue monitoring urine output, lytes, SCr/ BUN  - Replete lytes prn with goal K >4 and Mg >2    =============== GASTROINTESTINAL===================  Constipation/ileus, resolved  - s/p multiple BMs, symptoms improving   - c/w diet    ===================ENDO====================  Type 2 DM  - A1c 8.3  - Continue lantus, premeal, low ISS  - continue FS    ===================HEMATOLOGIC/ONC ===================  - H/H & plts stable  - Monitor H/H and plts  - VTE PPX: heparin gtt    ==================INFECTIOUS DISEASE================  # Leukocytosis  - Repeat BCx pending  - Monitor off abx at this time  - Monitor and trend WBC and temperature curve     # Enterococcus faecalis bacteremia, resolved  - BCx + for enterococcus faecalis x2, Staph epi x1 (likely contaminant)- pan sensitive   - Urine cx  + enterococcus faecalis  - BCx  no growth; repeat  NGTD  - IABP site swapped to RFA   - s/p Vancomycin 1g q12h (-)  - CT A/P negative for infectious pathology    # COVID, resolved  - Off airborne precautions     # Pre-transplant ID w/u   - Trend ID recs for serologies   - Colonoscopy  - normal   - Chest CT  - improved LLL aeration  - s/p immunizations      Patient requires continuous monitoring with bedside rhythm monitoring, pulse ox monitoring, and intermittent blood gas analysis. Care plan discussed with ICU care team. Patient remained critical and at risk for life threatening decompensation.  Patient seen, examined and plan discussed with CCU team during rounds.     I have personally provided ____ minutes of critical care time excluding time spent on separate procedures, in addition to initial critical care time provided by the CICU Attending, Dr. Lees.    By signing my name below, I, Kalyn Sousa, attest that this documentation has been prepared under the direction and in the presence of KARYN Choi.  Electronically signed: Rosalba Booth, 23 @ 20:03    I, Niurka Brizuela , personally performed the services described in this documentation. all medical record entries made by the scribe were at my direction and in my presence. I have reviewed the chart and agree that the record reflects my personal performance and is accurate and complete  Electronically signed: KARYN Choi.     DEVORAH VALENCIA  MRN-[id number]  Patient is a 58y old  Male who presents with a chief complaint of cardiogenic shock (04 Dec 2023 07:37)    HPI:  pt seen and approx 1:30 pm  in [facility]    59yo M w/ hx HTN, CAD w/ 1 stent in , ICH () presenting with abn ekg. Patient presented to [facility] where he was found to have STEMI, recommended to get cath however patient did not want to get it there so it left and came here.  Patient initially had cough, congestion, fever, was placed on antibiotics on .  Started feeling nauseous and had a presyncopal event after which he presented to ED last night.  Had chest pain as well.  Chest pain is midsternal.  Not currently having chest pain.  Received 4 aspirin 30 min pta. (2023 15:11)      24 HOUR EVENTS:  - Status 7 OHT d/t positive BCx x1  - restart vanco b/l  - LFTs downtrending  - Scr downtrending    REVIEW OF SYSTEMS:  CONSTITUTIONAL: No weakness, fevers or chills  EYES/ENT: No visual changes;  No vertigo or throat pain   NECK: No pain or stiffness  RESPIRATORY: No cough, wheezing, hemoptysis; No shortness of breath  CARDIOVASCULAR: No chest pain or palpitations  GASTROINTESTINAL: No abdominal or epigastric pain. No nausea, vomiting, or hematemesis; No diarrhea or constipation. No melena or hematochezia.  GENITOURINARY: No dysuria, frequency or hematuria  NEUROLOGICAL: No numbness or weakness  SKIN: No itching, rashes      ICU Vital Signs Last 24 Hrs  T(C): 36.9 (04 Dec 2023 16:00), Max: 36.9 (03 Dec 2023 23:00)  T(F): 98.4 (04 Dec 2023 16:00), Max: 98.5 (03 Dec 2023 23:00)  HR: 79 (04 Dec 2023 20:00) (71 - 83)  BP: --  BP(mean): --  ABP: --  ABP(mean): --  RR: 17 (04 Dec 2023 20:00) (13 - 25)  SpO2: 100% (04 Dec 2023 20:00) (97% - 100%)    O2 Parameters below as of 04 Dec 2023 19:00  Patient On (Oxygen Delivery Method): room air            CVP(mm Hg): --  CO: --  CI: --  PA: --  PA(mean): --  PA(direct): --  PCWP: --  LA: --  RA: --  SVR: --  SVRI: --  PVR: --  PVRI: --  I&O's Summary    03 Dec 2023 07:01  -  04 Dec 2023 07:00  --------------------------------------------------------  IN: 1366 mL / OUT: 2220 mL / NET: -854 mL    04 Dec 2023 07:01  -  04 Dec 2023 20:03  --------------------------------------------------------  IN: 1017 mL / OUT: 400 mL / NET: 617 mL        CAPILLARY BLOOD GLUCOSE    CAPILLARY BLOOD GLUCOSE      POCT Blood Glucose.: 187 mg/dL (04 Dec 2023 16:45)      PHYSICAL EXAM:  GENERAL: No acute distress, well-developed  HEAD:  Atraumatic, Normocephalic  EYES: EOMI, PERRLA, conjunctiva and sclera clear  NECK: Supple, no lymphadenopathy, no JVD  CHEST/LUNG: CTAB; No wheezes, rales, or rhonchi  HEART: Regular rate and rhythm. Normal S1/S2. No murmurs, rubs, or gallops  ABDOMEN: Soft, non-tender, non-distended; normal bowel sounds, no organomegaly  EXTREMITIES:  1+ peripheral pulses b/l, No clubbing, cyanosis, or edema. RF IABP site clean, dry, intact  NEUROLOGY: A&O x 3, no focal deficits  SKIN: No rashes or lesions    ============================I/O===========================   I&O's Detail    03 Dec 2023 07:01  -  04 Dec 2023 07:00  --------------------------------------------------------  IN:    Heparin: 336 mL    Oral Fluid: 1030 mL  Total IN: 1366 mL    OUT:    Voided (mL): 2220 mL  Total OUT: 2220 mL    Total NET: -854 mL      04 Dec 2023 07:01  -  04 Dec 2023 20:03  --------------------------------------------------------  IN:    Heparin: 182 mL    IV PiggyBack: 275 mL    Oral Fluid: 560 mL  Total IN: 1017 mL    OUT:    Voided (mL): 400 mL  Total OUT: 400 mL    Total NET: 617 mL        ============================ LABS =========================                        11.6   7.33  )-----------( 133      ( 04 Dec 2023 00:40 )             34.7     12-04    132<L>  |  102  |  26<H>  ----------------------------<  201<H>  3.8   |  19<L>  |  1.12    Ca    9.5      04 Dec 2023 14:42  Phos  2.9     12-04  Mg     1.8     12-04    TPro  6.5  /  Alb  3.6  /  TBili  0.2  /  DBili  x   /  AST  40  /  ALT  97<H>  /  AlkPhos  59  12-04                LIVER FUNCTIONS - ( 04 Dec 2023 14:42 )  Alb: 3.6 g/dL / Pro: 6.5 g/dL / ALK PHOS: 59 U/L / ALT: 97 U/L / AST: 40 U/L / GGT: x           PT/INR - ( 04 Dec 2023 00:40 )   PT: 11.6 sec;   INR: 1.06 ratio         PTT - ( 04 Dec 2023 00:40 )  PTT:67.5 sec      Urinalysis Basic - ( 04 Dec 2023 14:42 )    Color: x / Appearance: x / SG: x / pH: x  Gluc: 201 mg/dL / Ketone: x  / Bili: x / Urobili: x   Blood: x / Protein: x / Nitrite: x   Leuk Esterase: x / RBC: x / WBC x   Sq Epi: x / Non Sq Epi: x / Bacteria: x      ======================Micro/Rad/Cardio=================  Telemtry: Reviewed   EKG: Reviewed  CXR: Reviewed  Culture: Reviewed   Echo:   Cath: Cardiac Cath Lab - Adult:   [facility]  Department of Cardiology  [address] [place] [address]  [phone]  Cath Lab Report -- Comprehensive Report  Patient: LD VALENCIA  Study date: 2017  Account number: [id number]  MR number: [id number]  : 1965  Gender: Male  Race: W  Case Physician(s):  Jairon Holguin M.D.  Referring Physician:  Luc Lynn M.D.  INDICATIONS: Unstable angina - CCS4.  HISTORY: The patient has a history of coronary artery disease. The patient  hashypertension and medication-treated dyslipidemia.  PROCEDURE:  --  Left heart catheterization with ventriculography.  --  Left coronary angiography.  --  Right coronary angiography.  TECHNIQUE: The risks and alternatives of the procedures and conscious  sedation were explained to the patient and informed consent was obtained.  Cardiac catheterization performed electively.  Local anesthetic given. Right radial artery access. A 6FR PRELUDE KIT was  inserted in the vessel. Left heart catheterization. Ventriculography was  performed. A 5FR FR4.0 EXPO catheter was utilized. Left coronary artery  angiography. The vessel was injected utilizing a 5FR FL3.5 EXPO catheter.  Right coronary artery angiography. The vessel was injected utilizing a 5FR  FR4.0 EXPO catheter. RADIATION EXPOSURE: 1.1 min.  CONTRAST GIVEN: Omnipaque 55 ml.  MEDICATIONS GIVEN: Midazolam, 1 mg, IV. Fentanyl, 25 mcg, IV. Verapamil  (Isoptin, Calan, Covera), 2.5 mg, IA. Heparin, 3000 units, IA.  VENTRICLES: Global left ventricular function was moderately depressed. EF  estimated was 40 %.  CORONARY VESSELS: The coronary circulation is right dominant.  LM:   --  LM: Normal.  LAD:   --  Proximal LAD: There was a 50 % stenosis.  CX:   --  Circumflex: Normal.  RCA:   --  Mid RCA: There was a 40 % stenosis.  --  Distal RCA: There was a 50 % stenosis.  COMPLICATIONS: There were no complications.  DIAGNOSTIC RECOMMENDATIONS: The patient should continue with the present  medications.  Prepared and signed by  Jairon Holguin M.D.  Signed 2017 12:20:13  HEMODYNAMIC TABLES  Pressures:  Baseline/ Room Air  Pressures:  - HR: 78  Pressures:  - Rhythm:  Pressures:  -- Aortic Pressure (S/D/M): --/--/99  Pressures:  -- Left Ventricle (s/edp): 157/39/--  Outputs:  Baseline/ Room Air  Outputs:  -- CALCULATIONS[age]  Outputs:  -- CALCULATIONS: Body Surface Area: 2.05  Outputs:  -- CALCULATIONS: Height in cm: 175.00  Outputs:  -- CALCULATIONS: Sex: Male  Outputs:  -- CALCULATIONS: Weight in k.40 (17 @ 21:55)    ======================================================  PAST MEDICAL & SURGICAL HISTORY:  HTN (hypertension)      CAD (coronary artery disease)  ; stent      Intracranial hemorrhage        Respiratory arrest        Myocardial infarction, unspecified MI type, unspecified artery      History of coronary artery stent placement  ====================ASSESSMENT ==============  58 male with HTN, CAD (s/p PCI ), HFrEF, CVA , and T2DM presenting with chest pressure and unknown tachycardia that was shocked x1, C  found to have in-stent restenosis of pLAD and  of RCA with elevated RA and PA pressures and severely decreased. Admitted to CICU for management of cardiogenic shock and ADHF requiring IABP  -, with hospital course c/b vfib arrest requiring reinsertion of IABP. Currently listed for transplant status 2.    Plan:  ====================== NEUROLOGY=====================  Anxiety  - No Seroquel/antipsychotics since vfib arrest and prolonged QTC  - Psych Eval, recommended SSRI, but pt. refused   - Psych recommending atarax PRN  - Continue to monitor mental status    PT/Conditioning  - Continue band exercised while on bedrest s/t IABP    ==================== RESPIRATORY======================  Acute Hypoxemic Respiratory Failure  - s/p x2 intubations for cardiogenic pulm edema and the in setting of cardiac arrest, resolved - extubated 11/10  - Currently on room air with spO2 mid 90s  - Continue incentive spirometry and monitoring of sp02    Asthma  - c/w albuterol, symbicort and spiriva  - On trelegy at home  - Continue to monitor SpO2 with goal >94%    ====================CARDIOVASCULAR==================  Vfib arrest i/s/o ischemia  - Lido gtt off   - PO Amio load - total of 5g per EP complete , continue PO amio  - Keep K > 4, Mag > 2.2     Cardiogenic shock requiring IABP (- , -)  - Likely 2/2 NSTEMI and ADHF  -  LHC: pLAD 100 % in-stent restenosis & mRCA, 100 %. PCWP 30. IABP placed.  -  TTE: LV dilated. EF 32 %. Regional WMAs present, mod (grade 2) LV diastolic dysfunction  -  TTE: EF 22% and + LV thrombus  -  s/p 500 cc bolus  - IABP swapped  to RFA, continue 1:1 support  - Off Milrinone gtt @ 7:30 am   - Currently on hydralazine 100 TID and ISD 40 TID for AL reduction  - [place] d/c'd due to elevated K levels  - c/w coreg 25 BID for GDMT  - Listed OHT status 2 , f/u HF recs, currently status 7 i/s/o positive BCx x1   - HIT and HAIDER sent (12/3) as per HF     NSTEMI iso stent re-occlusion of pLAD and 100%  of RCA  - EKG on admission w/ LBBB  - DAPT: c/w ASA, Brilinta d/c'd per transplant w/u  - c/w lipitor 80  - cMR deferred given necessity of IABP  - CT sx not recommending CABG, undergoing AT eval    LV thrombus  - c/w heparin gtt    ===================== RENAL =========================  Non-oliguric ARIC   - Baseline Cr: -  - Renal US: no evidence of renal artery stenosis  - Trend BMP, lytes daily, replace as needed  - Continue Strict I/Os, avoid nephrotoxins    Mild Hyponatremia/Hyperkalemia iso ARIC and or new CKD baseline  - Continue fluid restriction   - Urea powder d/c'd per renal  - Trend daily  - Continue monitoring urine output, lytes, SCr/ BUN  - Replete lytes prn with goal K >4 and Mg >2    =============== GASTROINTESTINAL===================  Constipation/ileus, resolved  - s/p multiple BMs, symptoms improving   - c/w diet    ===================ENDO====================  Type 2 DM  - A1c 8.3  - Continue lantus, premeal, low ISS  - continue FS    ===================HEMATOLOGIC/ONC ===================  - H/H & plts stable  - Monitor H/H and plts  - VTE PPX: heparin gtt    ==================INFECTIOUS DISEASE================  #Positive BCx x1   - Status 7 OHT  - Vanco by level  - F/U with transplant ID  - trend fever and WBC curve  - F/U repeat BCx     # Enterococcus faecalis bacteremia, resolved  - BCx + for enterococcus faecalis x2, Staph epi x1 (likely contaminant)- pan sensitive   - Urine cx  + enterococcus faecalis  - BCx  no growth; repeat  NGTD  - IABP site swapped to RFA   - s/p Vancomycin 1g q12h (-)  - CT A/P negative for infectious pathology    # COVID, resolved  - Off airborne precautions     # Pre-transplant ID w/u   - Trend ID recs for serologies   - Colonoscopy  - normal   - Chest CT  - improved LLL aeration  - s/p immunizations          Patient requires continuous monitoring with bedside rhythm monitoring, pulse ox monitoring, and intermittent blood gas analysis. Care plan discussed with ICU care team. Patient remained critical and at risk for life threatening decompensation.  Patient seen, examined and plan discussed with CCU team during rounds.     I have personally provided 35 minutes of critical care time excluding time spent on separate procedures, in addition to initial critical care time provided by the CICU Attending, Dr. Lees.    By signing my name below, I, Kalyn Sousa, attest that this documentation has been prepared under the direction and in the presence of KARYN Choi.  Electronically signed: Rosalba Booth, 23 @ 20:03    I, Niurka Brizuela , personally performed the services described in this documentation. all medical record entries made by the scribe were at my direction and in my presence. I have reviewed the chart and agree that the record reflects my personal performance and is accurate and complete  Electronically signed: KARYN Choi.     DEVORAH VALENCIA  MRN-[id number]  Patient is a 58y old  Male who presents with a chief complaint of cardiogenic shock (04 Dec 2023 07:37)    HPI:  pt seen and approx 1:30 pm  in [facility]    59yo M w/ hx HTN, CAD w/ 1 stent in , ICH () presenting with abn ekg. Patient presented to [facility] where he was found to have STEMI, recommended to get cath however patient did not want to get it there so it left and came here.  Patient initially had cough, congestion, fever, was placed on antibiotics on .  Started feeling nauseous and had a presyncopal event after which he presented to ED last night.  Had chest pain as well.  Chest pain is midsternal.  Not currently having chest pain.  Received 4 aspirin 30 min pta. (2023 15:11)      24 HOUR EVENTS:  - Status 7 OHT d/t positive BCx x1  - restart vanco b/l  - LFTs downtrending  - Scr downtrending    REVIEW OF SYSTEMS:  CONSTITUTIONAL: No weakness, fevers or chills  EYES/ENT: No visual changes;  No vertigo or throat pain   NECK: No pain or stiffness  RESPIRATORY: No cough, wheezing, hemoptysis; No shortness of breath  CARDIOVASCULAR: No chest pain or palpitations  GASTROINTESTINAL: No abdominal or epigastric pain. No nausea, vomiting, or hematemesis; No diarrhea or constipation. No melena or hematochezia.  GENITOURINARY: No dysuria, frequency or hematuria  NEUROLOGICAL: No numbness or weakness  SKIN: No itching, rashes      ICU Vital Signs Last 24 Hrs  T(C): 36.9 (04 Dec 2023 16:00), Max: 36.9 (03 Dec 2023 23:00)  T(F): 98.4 (04 Dec 2023 16:00), Max: 98.5 (03 Dec 2023 23:00)  HR: 79 (04 Dec 2023 20:00) (71 - 83)  BP: --  BP(mean): --  ABP: --  ABP(mean): --  RR: 17 (04 Dec 2023 20:00) (13 - 25)  SpO2: 100% (04 Dec 2023 20:00) (97% - 100%)    O2 Parameters below as of 04 Dec 2023 19:00  Patient On (Oxygen Delivery Method): room air            CVP(mm Hg): --  CO: --  CI: --  PA: --  PA(mean): --  PA(direct): --  PCWP: --  LA: --  RA: --  SVR: --  SVRI: --  PVR: --  PVRI: --  I&O's Summary    03 Dec 2023 07:01  -  04 Dec 2023 07:00  --------------------------------------------------------  IN: 1366 mL / OUT: 2220 mL / NET: -854 mL    04 Dec 2023 07:01  -  04 Dec 2023 20:03  --------------------------------------------------------  IN: 1017 mL / OUT: 400 mL / NET: 617 mL        CAPILLARY BLOOD GLUCOSE    CAPILLARY BLOOD GLUCOSE      POCT Blood Glucose.: 187 mg/dL (04 Dec 2023 16:45)      PHYSICAL EXAM:  GENERAL: No acute distress, well-developed  HEAD:  Atraumatic, Normocephalic  EYES: EOMI, PERRLA, conjunctiva and sclera clear  NECK: Supple, no lymphadenopathy, no JVD  CHEST/LUNG: CTAB; No wheezes, rales, or rhonchi  HEART: Regular rate and rhythm. Normal S1/S2. No murmurs, rubs, or gallops  ABDOMEN: Soft, non-tender, non-distended; normal bowel sounds, no organomegaly  EXTREMITIES:  1+ peripheral pulses b/l, No clubbing, cyanosis, or edema. RF IABP site clean, dry, intact  NEUROLOGY: A&O x 3, no focal deficits  SKIN: No rashes or lesions    ============================I/O===========================   I&O's Detail    03 Dec 2023 07:01  -  04 Dec 2023 07:00  --------------------------------------------------------  IN:    Heparin: 336 mL    Oral Fluid: 1030 mL  Total IN: 1366 mL    OUT:    Voided (mL): 2220 mL  Total OUT: 2220 mL    Total NET: -854 mL      04 Dec 2023 07:01  -  04 Dec 2023 20:03  --------------------------------------------------------  IN:    Heparin: 182 mL    IV PiggyBack: 275 mL    Oral Fluid: 560 mL  Total IN: 1017 mL    OUT:    Voided (mL): 400 mL  Total OUT: 400 mL    Total NET: 617 mL        ============================ LABS =========================                        11.6   7.33  )-----------( 133      ( 04 Dec 2023 00:40 )             34.7     12-04    132<L>  |  102  |  26<H>  ----------------------------<  201<H>  3.8   |  19<L>  |  1.12    Ca    9.5      04 Dec 2023 14:42  Phos  2.9     12-04  Mg     1.8     12-04    TPro  6.5  /  Alb  3.6  /  TBili  0.2  /  DBili  x   /  AST  40  /  ALT  97<H>  /  AlkPhos  59  12-04                LIVER FUNCTIONS - ( 04 Dec 2023 14:42 )  Alb: 3.6 g/dL / Pro: 6.5 g/dL / ALK PHOS: 59 U/L / ALT: 97 U/L / AST: 40 U/L / GGT: x           PT/INR - ( 04 Dec 2023 00:40 )   PT: 11.6 sec;   INR: 1.06 ratio         PTT - ( 04 Dec 2023 00:40 )  PTT:67.5 sec      Urinalysis Basic - ( 04 Dec 2023 14:42 )    Color: x / Appearance: x / SG: x / pH: x  Gluc: 201 mg/dL / Ketone: x  / Bili: x / Urobili: x   Blood: x / Protein: x / Nitrite: x   Leuk Esterase: x / RBC: x / WBC x   Sq Epi: x / Non Sq Epi: x / Bacteria: x      ======================Micro/Rad/Cardio=================  Telemtry: Reviewed   EKG: Reviewed  CXR: Reviewed  Culture: Reviewed   Echo:   Cath: Cardiac Cath Lab - Adult:   [facility]  Department of Cardiology  [address] [place] [address]  [phone]  Cath Lab Report -- Comprehensive Report  Patient: LD VALENCIA  Study date: 2017  Account number: [id number]  MR number: [id number]  : 1965  Gender: Male  Race: W  Case Physician(s):  Jairon Holguin M.D.  Referring Physician:  Luc Lynn M.D.  INDICATIONS: Unstable angina - CCS4.  HISTORY: The patient has a history of coronary artery disease. The patient  hashypertension and medication-treated dyslipidemia.  PROCEDURE:  --  Left heart catheterization with ventriculography.  --  Left coronary angiography.  --  Right coronary angiography.  TECHNIQUE: The risks and alternatives of the procedures and conscious  sedation were explained to the patient and informed consent was obtained.  Cardiac catheterization performed electively.  Local anesthetic given. Right radial artery access. A 6FR PRELUDE KIT was  inserted in the vessel. Left heart catheterization. Ventriculography was  performed. A 5FR FR4.0 EXPO catheter was utilized. Left coronary artery  angiography. The vessel was injected utilizing a 5FR FL3.5 EXPO catheter.  Right coronary artery angiography. The vessel was injected utilizing a 5FR  FR4.0 EXPO catheter. RADIATION EXPOSURE: 1.1 min.  CONTRAST GIVEN: Omnipaque 55 ml.  MEDICATIONS GIVEN: Midazolam, 1 mg, IV. Fentanyl, 25 mcg, IV. Verapamil  (Isoptin, Calan, Covera), 2.5 mg, IA. Heparin, 3000 units, IA.  VENTRICLES: Global left ventricular function was moderately depressed. EF  estimated was 40 %.  CORONARY VESSELS: The coronary circulation is right dominant.  LM:   --  LM: Normal.  LAD:   --  Proximal LAD: There was a 50 % stenosis.  CX:   --  Circumflex: Normal.  RCA:   --  Mid RCA: There was a 40 % stenosis.  --  Distal RCA: There was a 50 % stenosis.  COMPLICATIONS: There were no complications.  DIAGNOSTIC RECOMMENDATIONS: The patient should continue with the present  medications.  Prepared and signed by  Jairon Holguin M.D.  Signed 2017 12:20:13  HEMODYNAMIC TABLES  Pressures:  Baseline/ Room Air  Pressures:  - HR: 78  Pressures:  - Rhythm:  Pressures:  -- Aortic Pressure (S/D/M): --/--/99  Pressures:  -- Left Ventricle (s/edp): 157/39/--  Outputs:  Baseline/ Room Air  Outputs:  -- CALCULATIONS[age]  Outputs:  -- CALCULATIONS: Body Surface Area: 2.05  Outputs:  -- CALCULATIONS: Height in cm: 175.00  Outputs:  -- CALCULATIONS: Sex: Male  Outputs:  -- CALCULATIONS: Weight in k.40 (17 @ 21:55)    ======================================================  PAST MEDICAL & SURGICAL HISTORY:  HTN (hypertension)      CAD (coronary artery disease)  ; stent      Intracranial hemorrhage        Respiratory arrest        Myocardial infarction, unspecified MI type, unspecified artery      History of coronary artery stent placement  ====================ASSESSMENT ==============  58 male with HTN, CAD (s/p PCI ), HFrEF, CVA , and T2DM presenting with chest pressure and unknown tachycardia that was shocked x1, C  found to have in-stent restenosis of pLAD and  of RCA with elevated RA and PA pressures and severely decreased. Admitted to CICU for management of cardiogenic shock and ADHF requiring IABP  -, with hospital course c/b vfib arrest requiring reinsertion of IABP. Currently listed for transplant status 2.    Plan:  ====================== NEUROLOGY=====================  Anxiety  - No Seroquel/antipsychotics since vfib arrest and prolonged QTC  - Psych Eval, recommended SSRI, but pt. refused   - Psych recommending atarax PRN  - Continue to monitor mental status    PT/Conditioning  - Continue band exercised while on bedrest s/t IABP    ==================== RESPIRATORY======================  Acute Hypoxemic Respiratory Failure  - s/p x2 intubations for cardiogenic pulm edema and the in setting of cardiac arrest, resolved - extubated 11/10  - Currently on room air with spO2 mid 90s  - Continue incentive spirometry and monitoring of sp02    Asthma  - c/w albuterol, symbicort and spiriva  - On trelegy at home  - Continue to monitor SpO2 with goal >94%    ====================CARDIOVASCULAR==================  Vfib arrest i/s/o ischemia  - Lido gtt off   - PO Amio load - total of 5g per EP complete , continue PO amio  - Keep K > 4, Mag > 2.2     Cardiogenic shock requiring IABP (- , -)  - Likely 2/2 NSTEMI and ADHF  -  LHC: pLAD 100 % in-stent restenosis & mRCA, 100 %. PCWP 30. IABP placed.  -  TTE: LV dilated. EF 32 %. Regional WMAs present, mod (grade 2) LV diastolic dysfunction  -  TTE: EF 22% and + LV thrombus  -  s/p 500 cc bolus  - IABP swapped  to RFA, continue 1:1 support  - Off Milrinone gtt @ 7:30 am   - Currently on hydralazine 100 TID and ISD 40 TID for AL reduction  - [place] d/c'd due to elevated K levels  - c/w coreg 25 BID for GDMT  - Listed OHT status 2 , f/u HF recs, currently status 7 i/s/o positive BCx x1   - HIT and HAIDER sent (12/3) as per HF     NSTEMI iso stent re-occlusion of pLAD and 100%  of RCA  - EKG on admission w/ LBBB  - DAPT: c/w ASA, Brilinta d/c'd per transplant w/u  - c/w lipitor 80  - cMR deferred given necessity of IABP  - CT sx not recommending CABG, undergoing AT eval    LV thrombus  - c/w heparin gtt    ===================== RENAL =========================  Non-oliguric ARIC   - Baseline Cr: -  - Renal US: no evidence of renal artery stenosis  - Trend BMP, lytes daily, replace as needed  - Continue Strict I/Os, avoid nephrotoxins    Mild Hyponatremia/Hyperkalemia iso ARIC and or new CKD baseline  - Continue fluid restriction   - Urea powder d/c'd per renal  - Trend daily  - Continue monitoring urine output, lytes, SCr/ BUN  - Replete lytes prn with goal K >4 and Mg >2    =============== GASTROINTESTINAL===================  Constipation/ileus, resolved  - s/p multiple BMs, symptoms improving   - c/w diet    ===================ENDO====================  Type 2 DM  - A1c 8.3  - Continue lantus, premeal, low ISS  - continue FS    ===================HEMATOLOGIC/ONC ===================  - H/H & plts stable  - Monitor H/H and plts  - VTE PPX: heparin gtt    ==================INFECTIOUS DISEASE================  #Positive BCx x1   - Status 7 OHT  - Vanco by level  - F/U with transplant ID  - trend fever and WBC curve  - F/U repeat BCx     # Enterococcus faecalis bacteremia, resolved  - BCx + for enterococcus faecalis x2, Staph epi x1 (likely contaminant)- pan sensitive   - Urine cx  + enterococcus faecalis  - BCx  no growth; repeat  NGTD  - IABP site swapped to RFA   - s/p Vancomycin 1g q12h (-)  - CT A/P negative for infectious pathology    # COVID, resolved  - Off airborne precautions     # Pre-transplant ID w/u   - Trend ID recs for serologies   - Colonoscopy  - normal   - Chest CT  - improved LLL aeration  - s/p immunizations          Patient requires continuous monitoring with bedside rhythm monitoring, pulse ox monitoring, and intermittent blood gas analysis. Care plan discussed with ICU care team. Patient remained critical and at risk for life threatening decompensation.  Patient seen, examined and plan discussed with CCU team during rounds.     I have personally provided 35 minutes of critical care time excluding time spent on separate procedures, in addition to initial critical care time provided by the CICU Attending, Dr. Lees.    By signing my name below, I, Kalyn Sousa, attest that this documentation has been prepared under the direction and in the presence of KARYN Choi.  Electronically signed: Rosalba Booth, 23 @ 20:03    I, Niurka Brizuela , personally performed the services described in this documentation. all medical record entries made by the scribe were at my direction and in my presence. I have reviewed the chart and agree that the record reflects my personal performance and is accurate and complete  Electronically signed: KARYN Choi.

## 2023-12-04 NOTE — PROGRESS NOTE ADULT - ASSESSMENT
58 male with HTN, CAD (s/p PCI 2008), HFrEF, CVA 2018, and T2DM presenting with chest pressure and unknown tachycardia that was shocked x1, C 11/1 found to have in-stent restenosis of pLAD and  of RCA with elevated RA and PA pressures and severely decreased. Admitted to CICU for management of cardiogenic shock and ADHF requiring IABP 11/1 -11/7, with hospital course c/b vfib arrest requiring reinsertion of IABP. Currently listed for transplant status 2.    Plan:  ====================== NEUROLOGY=====================  Anxiety  - No Seroquel/antipsychotics since vfib arrest and prolonged QTC  - Psych Eval, recommended SSRI, but pt. refused   - Psych recommending atarax PRN  - Continue to monitor mental status    PT/Conditioning  - Continue band exercised while on bedrest s/t IABP    ==================== RESPIRATORY======================  Acute Hypoxemic Respiratory Failure  - s/p x2 intubations for cardiogenic pulm edema and the in setting of cardiac arrest, resolved - extubated 11/10  - Currently on room air with spO2 mid 90s  - Continue incentive spirometry and monitoring of sp02    Asthma  - c/w albuterol, symbicort and spiriva  - On trelegy at home  - Continue to monitor SpO2 with goal >94%    ====================CARDIOVASCULAR==================  Vfib arrest i/s/o ischemia  - Lido gtt off 11/13  - PO Amio load - total of 5g per EP complete 11/17, continue PO amio  - Keep K > 4, Mag > 2.2     Cardiogenic shock requiring IABP (11/1- 11/7, 11/9-)  - Likely 2/2 NSTEMI and ADHF  - 11/1 LHC: pLAD 100 % in-stent restenosis & mRCA, 100 %. PCWP 30. IABP placed.  - 11/1 TTE: LV dilated. EF 32 %. Regional WMAs present, mod (grade 2) LV diastolic dysfunction  - 11/2 TTE: EF 22% and + LV thrombus  - 11/29 s/p 500 cc bolus  - IABP swapped 11/20 to RFA, continue 1:1 support  - Off Milrinone gtt @ 7:30 am 11/11  - Currently on hydralazine 100 TID and ISD 40 TID for AL reduction  - [place] d/c'd due to elevated K levels  - c/w coreg 25 BID for GDMT  - Listed OHT status 2 11/22, f/u HF recs  - HIT and HAIDER sent (12/3) as per HF     NSTEMI iso stent re-occlusion of pLAD and 100%  of RCA  - EKG on admission w/ LBBB  - DAPT: c/w ASA, Brilinta d/c'd per transplant w/u  - c/w lipitor 80  - cMR deferred given necessity of IABP  - CT sx not recommending CABG, undergoing AT eval    LV thrombus  - c/w heparin gtt    ===================== RENAL =========================  Non-oliguric ARIC   - Baseline Cr: 1-1.22  - s/p 500 LR 11/29  - Renal US: no evidence of renal artery stenosis  - Trend BMP, lytes daily, replace as needed  - Continue Strict I/Os, avoid nephrotoxins    Mild Hyponatremia/Hyperkalemia iso ARIC and or new CKD baseline  - Continue fluid restriction   - Urea powder d/c'd per renal  - Trend daily  - Continue monitoring urine output, lytes, SCr/ BUN  - Replete lytes prn with goal K >4 and Mg >2    =============== GASTROINTESTINAL===================  Constipation/ileus, resolved  - s/p multiple BMs, symptoms improving   - c/w diet    ===================ENDO====================  Type 2 DM  - A1c 8.3  - Continue lantus, premeal, low ISS  - continue FS    ===================HEMATOLOGIC/ONC ===================  - H/H & plts stable  - Monitor H/H and plts  - VTE PPX: heparin gtt    ==================INFECTIOUS DISEASE================  # Leukocytosis  - Repeat BCx pending  - Monitor off abx at this time  - Monitor and trend WBC and temperature curve     # Enterococcus faecalis bacteremia, resolved  - BCx 11/17+ for enterococcus faecalis x2, Staph epi x1 (likely contaminant)- pan sensitive   - Urine cx 11/18 + enterococcus faecalis  - BCx 11/18 NGTD  - IABP site swapped to RFA 11/20  - s/p Vancomycin 1g q12h (11/18-11/27)  - CT A/P negative for infectious pathology    # COVID, resolved  - Off airborne precautions 11/11    # Pre-transplant ID w/u   - Trend ID recs for serologies   - Colonoscopy 11/17 - normal   - Chest CT 11/17 - improved LLL aeration  - s/p immunizations   58 male with HTN, CAD (s/p PCI 2008), HFrEF, CVA 2018, and T2DM presenting with chest pressure and unknown tachycardia that was shocked x1, C 11/1 found to have in-stent restenosis of pLAD and  of RCA with elevated RA and PA pressures and severely decreased. Admitted to CICU for management of cardiogenic shock and ADHF requiring IABP 11/1 -11/7, with hospital course c/b vfib arrest requiring reinsertion of IABP. Currently listed for transplant status 2.    Plan:  ====================== NEUROLOGY=====================  Anxiety  - No Seroquel/antipsychotics since vfib arrest and prolonged QTC  - Psych Eval, recommended SSRI, but pt. refused   - Psych recommending atarax PRN  - Continue to monitor mental status    PT/Conditioning  - Continue band exercised while on bedrest s/t IABP    ==================== RESPIRATORY======================  Acute Hypoxemic Respiratory Failure  - s/p x2 intubations for cardiogenic pulm edema and the in setting of cardiac arrest, resolved - extubated 11/10  - Currently on room air with spO2 mid 90s  - Continue incentive spirometry and monitoring of sp02    Asthma  - c/w albuterol, symbicort and spiriva  - On trelegy at home  - Continue to monitor SpO2 with goal >94%    ====================CARDIOVASCULAR==================  Vfib arrest i/s/o ischemia  - Lido gtt off 11/13  - PO Amio load - total of 5g per EP complete 11/17, continue PO amio  - Keep K > 4, Mag > 2.2     Cardiogenic shock requiring IABP (11/1- 11/7, 11/9-)  - Likely 2/2 NSTEMI and ADHF  - 11/1 LHC: pLAD 100 % in-stent restenosis & mRCA, 100 %. PCWP 30. IABP placed.  - 11/1 TTE: LV dilated. EF 32 %. Regional WMAs present, mod (grade 2) LV diastolic dysfunction  - 11/2 TTE: EF 22% and + LV thrombus  - 11/29 s/p 500 cc bolus  - IABP swapped 11/20 to RFA, continue 1:1 support  - Off Milrinone gtt @ 7:30 am 11/11  - Currently on hydralazine 100 TID and ISD 40 TID for AL reduction  - [place] d/c'd due to elevated K levels  - c/w coreg 25 BID for GDMT  - Listed OHT status 2 11/22, f/u HF recs  - HIT and HAIDER sent (12/3) as per HF     NSTEMI iso stent re-occlusion of pLAD and 100%  of RCA  - EKG on admission w/ LBBB  - DAPT: c/w ASA, Brilinta d/c'd per transplant w/u  - c/w lipitor 80  - cMR deferred given necessity of IABP  - CT sx not recommending CABG, undergoing AT eval    LV thrombus  - c/w heparin gtt    ===================== RENAL =========================  Non-oliguric ARIC   - Baseline Cr: 1-1.22  - s/p 500 LR 11/29  - Renal US: no evidence of renal artery stenosis  - Trend BMP, lytes daily, replace as needed  - Continue Strict I/Os, avoid nephrotoxins    Mild Hyponatremia/Hyperkalemia iso ARIC and or new CKD baseline  - Continue fluid restriction   - Urea powder d/c'd per renal  - Trend daily  - Continue monitoring urine output, lytes, SCr/ BUN  - Replete lytes prn with goal K >4 and Mg >2    =============== GASTROINTESTINAL===================  Constipation/ileus, resolved  - s/p multiple BMs, symptoms improving   - c/w diet    ===================ENDO====================  Type 2 DM  - A1c 8.3  - Continue lantus, premeal, low ISS  - continue FS    ===================HEMATOLOGIC/ONC ===================  - H/H & plts stable  - Monitor H/H and plts  - VTE PPX: heparin gtt    ==================INFECTIOUS DISEASE================  # Leukocytosis  - Repeat BCx pending  - Monitor off abx at this time  - Monitor and trend WBC and temperature curve     # Enterococcus faecalis bacteremia, resolved  - BCx 11/17+ for enterococcus faecalis x2, Staph epi x1 (likely contaminant)- pan sensitive   - Urine cx 11/18 + enterococcus faecalis  - BCx 11/18 NGTD  - IABP site swapped to RFA 11/20  - s/p Vancomycin 1g q12h (11/18-11/27)  - CT A/P negative for infectious pathology    # COVID, resolved  - Off airborne precautions 11/11    # Pre-transplant ID w/u   - Trend ID recs for serologies   - Colonoscopy 11/17 - normal   - Chest CT 11/17 - improved LLL aeration  - s/p immunizations   58 male with HTN, CAD (s/p PCI 2008), HFrEF, CVA 2018, and T2DM presenting with chest pressure and unknown tachycardia that was shocked x1, C 11/1 found to have in-stent restenosis of pLAD and  of RCA with elevated RA and PA pressures and severely decreased. Admitted to CICU for management of cardiogenic shock and ADHF requiring IABP 11/1 -11/7, with hospital course c/b vfib arrest requiring reinsertion of IABP. Currently listed for transplant status 2.    Plan:  ====================== NEUROLOGY=====================  Anxiety  - No Seroquel/antipsychotics since vfib arrest and prolonged QTC  - Psych Eval, recommended SSRI, but pt. refused   - Psych recommending atarax PRN  - Continue to monitor mental status    PT/Conditioning  - Continue band exercised while on bedrest s/t IABP    ==================== RESPIRATORY======================  Acute Hypoxemic Respiratory Failure  - s/p x2 intubations for cardiogenic pulm edema and the in setting of cardiac arrest, resolved - extubated 11/10  - Currently on room air with spO2 mid 90s  - Continue incentive spirometry and monitoring of sp02    Asthma  - c/w albuterol, symbicort and spiriva  - On trelegy at home  - Continue to monitor SpO2 with goal >94%    ====================CARDIOVASCULAR==================  Vfib arrest i/s/o ischemia  - Lido gtt off 11/13  - PO Amio load - total of 5g per EP complete 11/17, continue PO amio  - Keep K > 4, Mag > 2.2     Cardiogenic shock requiring IABP (11/1- 11/7, 11/9-)  - Likely 2/2 NSTEMI and ADHF  - 11/1 LHC: pLAD 100 % in-stent restenosis & mRCA, 100 %. PCWP 30. IABP placed.  - 11/1 TTE: LV dilated. EF 32 %. Regional WMAs present, mod (grade 2) LV diastolic dysfunction  - 11/2 TTE: EF 22% and + LV thrombus  - 11/29 s/p 500 cc bolus  - IABP swapped 11/20 to RFA, continue 1:1 support  - Off Milrinone gtt @ 7:30 am 11/11  - Currently on hydralazine 100 TID and ISD 40 TID for AL reduction  - [place] d/c'd due to elevated K levels  - c/w coreg 25 BID for GDMT  - Listed OHT status 2 11/22, f/u HF recs  - HIT and HAIDER sent (12/3) as per HF     NSTEMI iso stent re-occlusion of pLAD and 100%  of RCA  - EKG on admission w/ LBBB  - DAPT: c/w ASA, Brilinta d/c'd per transplant w/u  - c/w lipitor 80  - cMR deferred given necessity of IABP  - CT sx not recommending CABG, undergoing AT eval    LV thrombus  - c/w heparin gtt    ===================== RENAL =========================  Non-oliguric ARIC   - Baseline Cr: 1-1.22  - Renal US: no evidence of renal artery stenosis  - Trend BMP, lytes daily, replace as needed  - Continue Strict I/Os, avoid nephrotoxins    Mild Hyponatremia/Hyperkalemia iso ARIC and or new CKD baseline  - Continue fluid restriction   - Urea powder d/c'd per renal  - Trend daily  - Continue monitoring urine output, lytes, SCr/ BUN  - Replete lytes prn with goal K >4 and Mg >2    =============== GASTROINTESTINAL===================  Constipation/ileus, resolved  - s/p multiple BMs, symptoms improving   - c/w diet    ===================ENDO====================  Type 2 DM  - A1c 8.3  - Continue lantus, premeal, low ISS  - continue FS    ===================HEMATOLOGIC/ONC ===================  - H/H & plts stable  - Monitor H/H and plts  - VTE PPX: heparin gtt    ==================INFECTIOUS DISEASE================  # Leukocytosis  - Repeat BCx pending  - Monitor off abx at this time  - Monitor and trend WBC and temperature curve     # Enterococcus faecalis bacteremia, resolved  - BCx 11/17+ for enterococcus faecalis x2, Staph epi x1 (likely contaminant)- pan sensitive   - Urine cx 11/18 + enterococcus faecalis  - BCx 11/18 no growth; repeat 11/30 NGTD  - IABP site swapped to RFA 11/20  - s/p Vancomycin 1g q12h (11/18-11/27)  - CT A/P negative for infectious pathology    # COVID, resolved  - Off airborne precautions 11/11    # Pre-transplant ID w/u   - Trend ID recs for serologies   - Colonoscopy 11/17 - normal   - Chest CT 11/17 - improved LLL aeration  - s/p immunizations   58 male with HTN, CAD (s/p PCI 2008), HFrEF, CVA 2018, and T2DM presenting with chest pressure and unknown tachycardia that was shocked x1, C 11/1 found to have in-stent restenosis of pLAD and  of RCA with elevated RA and PA pressures and severely decreased. Admitted to CICU for management of cardiogenic shock and ADHF requiring IABP 11/1 -11/7, with hospital course c/b vfib arrest requiring reinsertion of IABP. Currently listed for transplant status 2.    Plan:  ====================== NEUROLOGY=====================  Anxiety  - No Seroquel/antipsychotics since vfib arrest and prolonged QTC  - Psych Eval, recommended SSRI, but pt. refused   - Psych recommending atarax PRN  - Continue to monitor mental status    PT/Conditioning  - Continue band exercised while on bedrest s/t IABP    ==================== RESPIRATORY======================  Acute Hypoxemic Respiratory Failure  - s/p x2 intubations for cardiogenic pulm edema and the in setting of cardiac arrest, resolved - extubated 11/10  - Currently on room air with spO2 mid 90s  - Continue incentive spirometry and monitoring of sp02    Asthma  - c/w albuterol, symbicort and spiriva  - On trelegy at home  - Continue to monitor SpO2 with goal >94%    ====================CARDIOVASCULAR==================  Vfib arrest i/s/o ischemia  - Lido gtt off 11/13  - PO Amio load - total of 5g per EP complete 11/17, continue PO amio  - Keep K > 4, Mag > 2.2     Cardiogenic shock requiring IABP (11/1- 11/7, 11/9-)  - Likely 2/2 NSTEMI and ADHF  - 11/1 LHC: pLAD 100 % in-stent restenosis & mRCA, 100 %. PCWP 30. IABP placed.  - 11/1 TTE: LV dilated. EF 32 %. Regional WMAs present, mod (grade 2) LV diastolic dysfunction  - 11/2 TTE: EF 22% and + LV thrombus  - 11/29 s/p 500 cc bolus  - IABP swapped 11/20 to RFA, continue 1:1 support  - Off Milrinone gtt @ 7:30 am 11/11  - Currently on hydralazine 100 TID and ISD 40 TID for AL reduction  - [place] d/c'd due to elevated K levels  - c/w coreg 25 BID for GDMT  - Listed OHT status 2 11/22, f/u HF recs  - HIT and HAIDER sent (12/3) as per HF     NSTEMI iso stent re-occlusion of pLAD and 100%  of RCA  - EKG on admission w/ LBBB  - DAPT: c/w ASA, Brilinta d/c'd per transplant w/u  - c/w lipitor 80  - cMR deferred given necessity of IABP  - CT sx not recommending CABG, undergoing AT eval    LV thrombus  - c/w heparin gtt    ===================== RENAL =========================  Non-oliguric ARIC   - Baseline Cr: 1-1.22  - Renal US: no evidence of renal artery stenosis  - Trend BMP, lytes daily, replace as needed  - Continue Strict I/Os, avoid nephrotoxins    Mild Hyponatremia/Hyperkalemia iso ARIC and or new CKD baseline  - Continue fluid restriction   - Urea powder d/c'd per renal  - Trend daily  - Continue monitoring urine output, lytes, SCr/ BUN  - Replete lytes prn with goal K >4 and Mg >2    =============== GASTROINTESTINAL===================  Constipation/ileus, resolved  - s/p multiple BMs, symptoms improving   - c/w diet    ===================ENDO====================  Type 2 DM  - A1c 8.3  - Continue lantus, premeal, low ISS  - continue FS    ===================HEMATOLOGIC/ONC ===================  - H/H & plts stable  - Monitor H/H and plts  - VTE PPX: heparin gtt    ==================INFECTIOUS DISEASE================  # Leukocytosis  - Repeat BCx pending  - Monitor off abx at this time  - Monitor and trend WBC and temperature curve     # Enterococcus faecalis bacteremia, resolved  - BCx 11/17+ for enterococcus faecalis x2, Staph epi x1 (likely contaminant)- pan sensitive   - Urine cx 11/18 + enterococcus faecalis  - BCx 11/18 no growth; repeat 11/30 NGTD  - IABP site swapped to RFA 11/20  - s/p Vancomycin 1g q12h (11/18-11/27)  - CT A/P negative for infectious pathology    # COVID, resolved  - Off airborne precautions 11/11    # Pre-transplant ID w/u   - Trend ID recs for serologies   - Colonoscopy 11/17 - normal   - Chest CT 11/17 - improved LLL aeration  - s/p immunizations

## 2023-12-04 NOTE — PROGRESS NOTE ADULT - ASSESSMENT
58 yrs old male w/ hx of HFrEF (LVIDd 6.4 cm, LVEF 32%), CAD s/p PCI (2008), HTN, DMT2 (A1c 8.3%) and CVA s/p TPA (2018), recently treated for PNA who initially presented to [facility] via EMS after syncope reportedly requiring defibrilation. Treated for ACS but left AMA to come to [facility]. Pt has covid and was placed on IABP for hypotension. Now being evaluated for Heart transplant Vs LVAD placement. Nephrology consulted for ARIC.               58 yrs old male w/ hx of HFrEF (LVIDd 6.4 cm, LVEF 32%), CAD s/p PCI (2008), HTN, DMT2 (A1c 8.3%) and CVA s/p TPA (2018), recently treated for PNA who initially presented to [facility] via EMS after syncope reportedly requiring defibrilation. Treated for ACS but left AMA to come to [facility]. Pt has covid and was placed on IABP for hypotension. Now being evaluated for Heart transplant Vs LVAD placement. Nephrology consulted for ARIC.

## 2023-12-04 NOTE — PROGRESS NOTE ADULT - SUBJECTIVE AND OBJECTIVE BOX
Patient seen and examined at bedside.    Overnight Events: No acute events overnight. Denies chest pain, palpitations or shortness of breath. Overall feels well. Continues on IABP: 1:1    REVIEW OF SYSTEMS:  Constitutional:     [ x] negative [ ] fevers [ ] chills [ ] weight loss [ ] weight gain  HEENT:                  [ x] negative [ ] dry eyes [ ] eye irritation [ ] postnasal drip [ ] nasal congestion  CV:                         [x ] negative  [ ] chest pain [ ] orthopnea [ ] palpitations [ ] murmur  Resp:                     [ x] negative [ ] cough [ ] shortness of breath [ ] dyspnea [ ] wheezing [ ] sputum [ ]hemoptysis  GI:                          [ x] negative [ ] nausea [ ] vomiting [ ] diarrhea [ ] constipation [ ] abd pain [ ] dysphagia   :                        [ x] negative [ ] dysuria [ ] nocturia [ ] hematuria [ ] increased urinary frequency  Musculoskeletal: [ x] negative [ ] back pain [ ] myalgias [ ] arthralgias [ ] fracture  Skin:                       [ x] negative [ ] rash [ ] itch  Neurological:        [ x] negative [ ] headache [ ] dizziness [ ] syncope [ ] weakness [ ] numbness  Psychiatric:           [ x] negative [ ] anxiety [ ] depression  Endocrine:            [ x] negative [ ] diabetes [ ] thyroid problem  Heme/Lymph:      [ x] negative [ ] anemia [ ] bleeding problem  Allergic/Immune: [x ] negative [ ] itchy eyes [ ] nasal discharge [ ] hives [ ] angioedema    [x ] All other systems negative  [ ] Unable to assess ROS due to    Current Meds:  aMIOdarone    Tablet   Oral   aMIOdarone    Tablet 200 milliGRAM(s) Oral daily  aspirin  chewable 81 milliGRAM(s) Oral daily  atorvastatin 80 milliGRAM(s) Oral at bedtime  budesonide  80 MICROgram(s)/formoterol 4.5 MICROgram(s) Inhaler 2 Puff(s) Inhalation two times a day  carvedilol 25 milliGRAM(s) Oral every 12 hours  chlorhexidine 2% Cloths 1 Application(s) Topical daily  heparin  Infusion 1300 Unit(s)/Hr IV Continuous <Continuous>  hydrALAZINE 100 milliGRAM(s) Oral three times a day  hydrOXYzine hydrochloride 25 milliGRAM(s) Oral two times a day PRN  insulin glargine Injectable (LANTUS) 12 Unit(s) SubCutaneous at bedtime  insulin lispro (ADMELOG) corrective regimen sliding scale   SubCutaneous three times a day before meals  insulin lispro (ADMELOG) corrective regimen sliding scale   SubCutaneous at bedtime  insulin lispro Injectable (ADMELOG) 9 Unit(s) SubCutaneous three times a day before meals  isosorbide   dinitrate Tablet (ISORDIL) 40 milliGRAM(s) Oral three times a day  polyethylene glycol 3350 17 Gram(s) Oral two times a day  senna 2 Tablet(s) Oral at bedtime      PAST MEDICAL & SURGICAL HISTORY:  HTN (hypertension)      CAD (coronary artery disease)  2009; stent      Intracranial hemorrhage  2008      Respiratory arrest  december 1st      Myocardial infarction, unspecified MI type, unspecified artery      History of coronary artery stent placement          Vitals:  T(F): 98.4 (12-04), Max: 98.5 (12-03)  HR: 73 (12-04) (70 - 87)  BP: 113/55 (12-03) (113/55 - 113/55)  RR: 24 (12-04)  SpO2: 100% (12-04)  I&O's Summary    03 Dec 2023 07:01  -  04 Dec 2023 07:00  --------------------------------------------------------  IN: 1366 mL / OUT: 2220 mL / NET: -854 mL    04 Dec 2023 07:01  -  04 Dec 2023 12:46  --------------------------------------------------------  IN: 282 mL / OUT: 400 mL / NET: -118 mL        Physical Exam:  Appearance: No acute distress; well appearing  Eyes: PERRL, EOMI, pink conjunctiva  HENT: Normal oral mucosa  Cardiovascular: RRR, S1, S2, IABP in place.   Respiratory: Clear to auscultation bilaterally  Gastrointestinal: soft, non-tender, non-distended with normal bowel sounds  Musculoskeletal: No clubbing; no joint deformity   Neurologic: Non-focal  Lymphatic: No lymphadenopathy  Psychiatry: AAOx3, mood & affect appropriate  Skin: erythematous rash on bilat UE.                         11.6   7.33  )-----------( 133      ( 04 Dec 2023 00:40 )             34.7     12-04    133<L>  |  102  |  32<H>  ----------------------------<  160<H>  4.6   |  21<L>  |  1.42<H>    Ca    9.9      04 Dec 2023 00:39  Phos  2.9     12-04  Mg     2.1     12-04    TPro  7.1  /  Alb  3.8  /  TBili  0.2  /  DBili  x   /  AST  48<H>  /  ALT  102<H>  /  AlkPhos  67  12-04    PT/INR - ( 04 Dec 2023 00:40 )   PT: 11.6 sec;   INR: 1.06 ratio         PTT - ( 04 Dec 2023 00:40 )  PTT:67.5 sec

## 2023-12-04 NOTE — PROGRESS NOTE ADULT - PROBLEM SELECTOR PLAN 1
- L IABP at 1:1, placed 11/9. Exchange to LFA given high grade bacteremia on 11/20. On AC with Heparin infusion (also for LV thrombus)  - Continue Coreg 25 mg BID hold for MAP <65  - Continue HDZN 100 mg TID and ISDN 40 mg TID, hold for MAP <65  - Off romel given HyperK  - AT evaluation: Accepted for transplant, currently listed UNOS Status 2. ABO A. PRA 0% 11/13. Last Brilinta dose was on 11/13  -NOW with GM + rods in blood culture on 11/30 (reported on 12/4), please have transplant ID weigh in.   - Please keep primary Dr. Banuelos [phone] in the loop per family request. - L IABP at 1:1, placed 11/9. Exchange to LFA given high grade bacteremia on 11/20. On AC with Heparin infusion (also for LV thrombus)  - Continue Coreg 25 mg BID hold for MAP <65  - Continue HDZN 100 mg TID and ISDN 40 mg TID, hold for MAP <65  - Off romel given HyperK  - AT evaluation: Accepted for transplant, currently listed UNOS Status 2. ABO A. PRA 0% 11/13. Last Brilinta dose was on 11/13  -NOW with GM + rods in blood culture on 11/30 (reported on 12/4), please have transplant ID weigh in.   - Please keep primary Dr. Banuelos [phone] in the loop per family request.

## 2023-12-04 NOTE — PROGRESS NOTE ADULT - ASSESSMENT
59 yo M with HFrEF (LVIDd 6.4 cm, LVEF 32%), CAD s/p PCI (2008), HTN, DMT2 (A1c 8.3%) and CVA s/p TPA (2018), recently treated for PNA who initially presented to [facility] via EMS after syncope reportedly requiring defibrilation. Treated for ACS but left AMA to come to [facility]. On arrival ECG with ST depression in lateral leads. Intubated 11/1 for respiratory failure. Found to have COVID. L/RHC 11/1revealing  of LAD and RCA, elevated filling pressures and CI of 1.5 prompting placement of IABP. Extubated 11/3. IABP was weaned to off 11/7, then the following day on 11/8, developed PMVT cardiac arrest with prompt CPR and defibrillation, started on IV Lidocaine and Amio. IABP ultimately replaced on 11/9 for worsening hypotension. TTE 11/11 revealing LV thrombus.     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 AT evaluation launched 11/10, he is ABO A. He was discussed during TSC on 11/16 and was approved for listing, however was found to be Bacteremic with 11/17 Blc Cx growing mixed cultures Staph epi and Enterococcus faecalis and started on IV Vanco. Repeat cultures from 11/18 reveal NGTD and therefore was cleared by ID for listing.     He appears adequately supported on IABP at 1:1, electrically quiescent on oral Amiodarone. Cr is improving with holding diuretics. Labs otherwise notable for worsening thrombocytopenia. Awaiting suitable donor. Now with GM + rods in blood culture on 11/30 (reported on 12/4)      Cardiac Studies  11/21/23 TTE: limited unable to rule out endocarditis, technically difficult study  11/1123 TTE: LVEF 22% (global), LV thrombus, normal RV size and function, mild MR, trace TR  11/1/23  LHC showed pLAD 70 % stenosis in the ostium portion of the segment and 100 % in-stent restenosis and in mRCA, 100 % stenosis.   11/1/23 RHC; RA 23 Vw 24, PA 52/33/40, PCWP 30 Vw 33, AO 85/66/73, PA sat 54.4%, CO/CI (F) 2.96/1.44, IABP was placed during the cath through R common femoral artery.   11/1/23 TTE: LVIDd 6.4cm , LVEF 32%, regional WMA, grade II DD,  TAPSE 1.7cm, mld MR, trace TR,     Bedside hemodynamics  11/25 IABP 1:1: CVP 3, PA  33-36/12 mean PA 20-23 PCWP 15-16, MVO2 72.0%, CO/CI 6.2/2.8,   dsc? (PVR 0.8-1.1 medina calculated by me)   11/3: IABP 1:1 RA 5; PA 27/12/18; CO/CI 5.6/2.6; MAP 90-100s.  11/4/23 IABP 1:3 CVP 4 PA 37/18 SvO2 83.8 CO/CI 11.2 CI 5.1  (in the setting of fever to 38.3C)  11/5 IABP 1:1 CVP 3 PA 48/27 SvO2 78.4% CO 8.2 CI 3.7   11/1 (IABP 1:1): CVP 1, PA 33/5/16, MvO2 74.3% 57 yo M with HFrEF (LVIDd 6.4 cm, LVEF 32%), CAD s/p PCI (2008), HTN, DMT2 (A1c 8.3%) and CVA s/p TPA (2018), recently treated for PNA who initially presented to [facility] via EMS after syncope reportedly requiring defibrilation. Treated for ACS but left AMA to come to [facility]. On arrival ECG with ST depression in lateral leads. Intubated 11/1 for respiratory failure. Found to have COVID. L/RHC 11/1revealing  of LAD and RCA, elevated filling pressures and CI of 1.5 prompting placement of IABP. Extubated 11/3. IABP was weaned to off 11/7, then the following day on 11/8, developed PMVT cardiac arrest with prompt CPR and defibrillation, started on IV Lidocaine and Amio. IABP ultimately replaced on 11/9 for worsening hypotension. TTE 11/11 revealing LV thrombus.     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 AT evaluation launched 11/10, he is ABO A. He was discussed during TSC on 11/16 and was approved for listing, however was found to be Bacteremic with 11/17 Blc Cx growing mixed cultures Staph epi and Enterococcus faecalis and started on IV Vanco. Repeat cultures from 11/18 reveal NGTD and therefore was cleared by ID for listing.     He appears adequately supported on IABP at 1:1, electrically quiescent on oral Amiodarone. Cr is improving with holding diuretics. Labs otherwise notable for worsening thrombocytopenia. Awaiting suitable donor. Now with GM + rods in blood culture on 11/30 (reported on 12/4)      Cardiac Studies  11/21/23 TTE: limited unable to rule out endocarditis, technically difficult study  11/1123 TTE: LVEF 22% (global), LV thrombus, normal RV size and function, mild MR, trace TR  11/1/23  LHC showed pLAD 70 % stenosis in the ostium portion of the segment and 100 % in-stent restenosis and in mRCA, 100 % stenosis.   11/1/23 RHC; RA 23 Vw 24, PA 52/33/40, PCWP 30 Vw 33, AO 85/66/73, PA sat 54.4%, CO/CI (F) 2.96/1.44, IABP was placed during the cath through R common femoral artery.   11/1/23 TTE: LVIDd 6.4cm , LVEF 32%, regional WMA, grade II DD,  TAPSE 1.7cm, mld MR, trace TR,     Bedside hemodynamics  11/25 IABP 1:1: CVP 3, PA  33-36/12 mean PA 20-23 PCWP 15-16, MVO2 72.0%, CO/CI 6.2/2.8,   dsc? (PVR 0.8-1.1 medina calculated by me)   11/3: IABP 1:1 RA 5; PA 27/12/18; CO/CI 5.6/2.6; MAP 90-100s.  11/4/23 IABP 1:3 CVP 4 PA 37/18 SvO2 83.8 CO/CI 11.2 CI 5.1  (in the setting of fever to 38.3C)  11/5 IABP 1:1 CVP 3 PA 48/27 SvO2 78.4% CO 8.2 CI 3.7   11/1 (IABP 1:1): CVP 1, PA 33/5/16, MvO2 74.3%

## 2023-12-04 NOTE — CHART NOTE - NSCHARTNOTEFT_GEN_A_CORE
Nutrition Follow Up Note  Patient seen for: Nutrition Consult for "Hemoglobin A1c elevated, pt needs diet teaching, pt eats high sugar foods"  Chart reviewed, events noted    Per chart, patient is a 59 y/o male with PMH including HTN, CAD (s/p PCI ), HFrEF (EF severely reduced ) not on GDMT, h/o CVA  (requiring TPA), DM. Patient presents to [facility] for SOB x1 week. Intubated on  for respiratory failure. Extubated to nasal cannula 11/3. Found to be COVID-19 positive w/ persistent fevers. S/p IABP for hemodynamic support in setting of possible sepsis. S/p VFib arrest requiring multiple shocks, ROSC ~10 minutes on , transfers back to CICU.    Source: [x] Patient       [x] EMR        [x] RN        [x] Family at bedside; pt's daughter & spouse       [] Other:  - If unable to interview patient: [] Trach/Vent/BiPAP  [] Disoriented/confused/inappropriate to interview    Diet, Consistent Carbohydrate w/Evening Snack:   1200mL Fluid Restriction ([id number]) (23 @ 08:32) [Active]  -> Pt reports intact appetite; eating well again at meals. Reports his family also bringing him some items from home. Education as below.    Nutrition-related concerns:  - Listed status 2 for OHT  - Remains on IABP for mechanical support  - 12 units lantus, ISS admleog for glycemic control at this time; HbA1c: 8.3% (suboptimal glycemic control for age)  - ARIC; Nephrology following    GI: Last BM: /3x1 per pt reports. Pt denies GI distress at this time.  Bowel Regimen? [x] Yes - senna, miralax    Weights:   Daily Weight in k.4 (-), Weight in k.5 (-), Weight in k.5 (), Weight in k.7 (), Weight in k.3 (), Weight in k.9 ()  Wt history: Pt reports UBW ~180-190 pounds; reports recent weights likely elevated secondary to fluid retention. On/off diuresis. Will monitor trends as able. Noted weight trending downward towards UBW of ~81-86 kG.    Drug Dosing Weight  Height (cm): 175.3 (2023 15:48)  Weight (kg): 98.9 (2023 15:48)  BMI (kg/m2): 32.2 (2023 15:48)-> recalculated based on lowest on daily wt of 87.4 kG (12-03) (28.4 kG/m2).  BSA (m2): 2.14 (2023 15:48)  Reported UBW: ~81-86 kG    MEDICATIONS  (STANDING):  aMIOdarone    Tablet   Oral   aMIOdarone    Tablet 200 milliGRAM(s) Oral daily  aspirin  chewable 81 milliGRAM(s) Oral daily  atorvastatin 80 milliGRAM(s) Oral at bedtime  budesonide  80 MICROgram(s)/formoterol 4.5 MICROgram(s) Inhaler 2 Puff(s) Inhalation two times a day  carvedilol 25 milliGRAM(s) Oral every 12 hours  chlorhexidine 2% Cloths 1 Application(s) Topical daily  heparin  Infusion 1300 Unit(s)/Hr (14 mL/Hr) IV Continuous <Continuous>  hydrALAZINE 100 milliGRAM(s) Oral three times a day  insulin glargine Injectable (LANTUS) 12 Unit(s) SubCutaneous at bedtime  insulin lispro (ADMELOG) corrective regimen sliding scale   SubCutaneous three times a day before meals  insulin lispro (ADMELOG) corrective regimen sliding scale   SubCutaneous at bedtime  insulin lispro Injectable (ADMELOG) 9 Unit(s) SubCutaneous three times a day before meals  isosorbide   dinitrate Tablet (ISORDIL) 40 milliGRAM(s) Oral three times a day  polyethylene glycol 3350 17 Gram(s) Oral two times a day  senna 2 Tablet(s) Oral at bedtime    Pertinent Labs:  @ 00:39: Na 133<L>, BUN 32<H>, Cr 1.42<H>, <H>, K+ 4.6, Phos 2.9, Mg 2.1, Alk Phos 67, ALT/SGPT 102<H>, AST/SGOT 48<H>, HbA1c --    A1C with Estimated Average Glucose Result: 8.3 % (23 @ 06:14)    Finger Sticks:  POCT Blood Glucose.: 140 mg/dL ( @ 08:35)  POCT Blood Glucose.: 127 mg/dL ( @ 22:06)  POCT Blood Glucose.: 214 mg/dL ( @ 17:34)    Triglycerides, Serum: 95 mg/dL (11-10-23 @ 17:25)    Skin per nursing documentation: no pressure injuries per documentation  Edema: none per documentation    Estimated Needs:   [x] recalculated: based on wt of 87 kG with consideration for increased nutrient needs:  Estimated Caloric Needs: (25-30 kcal/kg): 7858-6744 kcal  Estimated Protein Needs: (1.3-1.6 g/kg): 113-139 gm  Defer fluid needs to team.    Previous Nutrition Diagnosis: increased nutrient needs  Nutrition Diagnosis is: [x] ongoing - being addressed with adequate oral intake    Previous Nutrition Diagnosis: Inadequate energy intake  Nutrition Diagnosis is: [x] ongoing; pt appetite has improved    Previous Nutrition Diagnosis: Food & Nutrition-Related Knowledge Deficit   Nutrition Diagnosis is: [x] ongoing - being addressed with ongoing diet education PRN    New Nutrition Diagnosis: [x] N/A    Nutrition Care Plan:  [x] In Progress  [] Achieved  [] Not applicable    Nutrition Interventions:     Education Provided:       [x] Yes: 1. Food safety education re-discussed as pt listed for OHT.  2. RD also discussed education on T2DM nutrition therapy. Provided education on foods containing carbohydrates, foods containing proteins, and portion sizes. Discussed the importance of a balanced meal to maintain blood glucose. Recommended water consumption with avoidance of soda and juice. Discussed to avoid concentrated sweets. Pt somewhat receptive; would benefit from ongoing nutrition education follow up.    Recommendations:  1. Continue Consistent carbohydrate diet with snack + 1.2L Fluid restriction per team as tolerated  2. RD remains available to provide ongoing nutrition education PRN   3. Trend BG levels, renal indices, LFT's, electrolytes and triglycerides   4. Honor pt food preferences to promote adequate oral intake while in-house     Monitoring and Evaluation:   Continue to monitor nutritional intake, tolerance to diet prescription, weights, labs, skin integrity    RD remains available upon request and will follow up per protocol  Sultana Velazquez MS, RD, CDN, CNSC avail. on [employer] Teams Nutrition Follow Up Note  Patient seen for: Nutrition Consult for "Hemoglobin A1c elevated, pt needs diet teaching, pt eats high sugar foods"  Chart reviewed, events noted    Per chart, patient is a 59 y/o male with PMH including HTN, CAD (s/p PCI ), HFrEF (EF severely reduced ) not on GDMT, h/o CVA  (requiring TPA), DM. Patient presents to [facility] for SOB x1 week. Intubated on  for respiratory failure. Extubated to nasal cannula 11/3. Found to be COVID-19 positive w/ persistent fevers. S/p IABP for hemodynamic support in setting of possible sepsis. S/p VFib arrest requiring multiple shocks, ROSC ~10 minutes on , transfers back to CICU.    Source: [x] Patient       [x] EMR        [x] RN        [x] Family at bedside; pt's daughter & spouse       [] Other:  - If unable to interview patient: [] Trach/Vent/BiPAP  [] Disoriented/confused/inappropriate to interview    Diet, Consistent Carbohydrate w/Evening Snack:   1200mL Fluid Restriction ([id number]) (23 @ 08:32) [Active]  -> Pt reports intact appetite; eating well again at meals. Reports his family also bringing him some items from home. Education as below.    Nutrition-related concerns:  - Listed status 2 for OHT  - Remains on IABP for mechanical support  - 12 units lantus, ISS admleog for glycemic control at this time; HbA1c: 8.3% (suboptimal glycemic control for age)  - ARIC; Nephrology following    GI: Last BM: /3x1 per pt reports. Pt denies GI distress at this time.  Bowel Regimen? [x] Yes - senna, miralax    Weights:   Daily Weight in k.4 (-), Weight in k.5 (-), Weight in k.5 (), Weight in k.7 (), Weight in k.3 (), Weight in k.9 ()  Wt history: Pt reports UBW ~180-190 pounds; reports recent weights likely elevated secondary to fluid retention. On/off diuresis. Will monitor trends as able. Noted weight trending downward towards UBW of ~81-86 kG.    Drug Dosing Weight  Height (cm): 175.3 (2023 15:48)  Weight (kg): 98.9 (2023 15:48)  BMI (kg/m2): 32.2 (2023 15:48)-> recalculated based on lowest on daily wt of 87.4 kG (12-03) (28.4 kG/m2).  BSA (m2): 2.14 (2023 15:48)  Reported UBW: ~81-86 kG    MEDICATIONS  (STANDING):  aMIOdarone    Tablet   Oral   aMIOdarone    Tablet 200 milliGRAM(s) Oral daily  aspirin  chewable 81 milliGRAM(s) Oral daily  atorvastatin 80 milliGRAM(s) Oral at bedtime  budesonide  80 MICROgram(s)/formoterol 4.5 MICROgram(s) Inhaler 2 Puff(s) Inhalation two times a day  carvedilol 25 milliGRAM(s) Oral every 12 hours  chlorhexidine 2% Cloths 1 Application(s) Topical daily  heparin  Infusion 1300 Unit(s)/Hr (14 mL/Hr) IV Continuous <Continuous>  hydrALAZINE 100 milliGRAM(s) Oral three times a day  insulin glargine Injectable (LANTUS) 12 Unit(s) SubCutaneous at bedtime  insulin lispro (ADMELOG) corrective regimen sliding scale   SubCutaneous three times a day before meals  insulin lispro (ADMELOG) corrective regimen sliding scale   SubCutaneous at bedtime  insulin lispro Injectable (ADMELOG) 9 Unit(s) SubCutaneous three times a day before meals  isosorbide   dinitrate Tablet (ISORDIL) 40 milliGRAM(s) Oral three times a day  polyethylene glycol 3350 17 Gram(s) Oral two times a day  senna 2 Tablet(s) Oral at bedtime    Pertinent Labs:  @ 00:39: Na 133<L>, BUN 32<H>, Cr 1.42<H>, <H>, K+ 4.6, Phos 2.9, Mg 2.1, Alk Phos 67, ALT/SGPT 102<H>, AST/SGOT 48<H>, HbA1c --    A1C with Estimated Average Glucose Result: 8.3 % (23 @ 06:14)    Finger Sticks:  POCT Blood Glucose.: 140 mg/dL ( @ 08:35)  POCT Blood Glucose.: 127 mg/dL ( @ 22:06)  POCT Blood Glucose.: 214 mg/dL ( @ 17:34)    Triglycerides, Serum: 95 mg/dL (11-10-23 @ 17:25)    Skin per nursing documentation: no pressure injuries per documentation  Edema: none per documentation    Estimated Needs:   [x] recalculated: based on wt of 87 kG with consideration for increased nutrient needs:  Estimated Caloric Needs: (25-30 kcal/kg): 7313-7303 kcal  Estimated Protein Needs: (1.3-1.6 g/kg): 113-139 gm  Defer fluid needs to team.    Previous Nutrition Diagnosis: increased nutrient needs  Nutrition Diagnosis is: [x] ongoing - being addressed with adequate oral intake    Previous Nutrition Diagnosis: Inadequate energy intake  Nutrition Diagnosis is: [x] ongoing; pt appetite has improved    Previous Nutrition Diagnosis: Food & Nutrition-Related Knowledge Deficit   Nutrition Diagnosis is: [x] ongoing - being addressed with ongoing diet education PRN    New Nutrition Diagnosis: [x] N/A    Nutrition Care Plan:  [x] In Progress  [] Achieved  [] Not applicable    Nutrition Interventions:     Education Provided:       [x] Yes: 1. Food safety education re-discussed as pt listed for OHT.  2. RD also discussed education on T2DM nutrition therapy. Provided education on foods containing carbohydrates, foods containing proteins, and portion sizes. Discussed the importance of a balanced meal to maintain blood glucose. Recommended water consumption with avoidance of soda and juice. Discussed to avoid concentrated sweets. Pt somewhat receptive; would benefit from ongoing nutrition education follow up.    Recommendations:  1. Continue Consistent carbohydrate diet with snack + 1.2L Fluid restriction per team as tolerated  2. RD remains available to provide ongoing nutrition education PRN   3. Trend BG levels, renal indices, LFT's, electrolytes and triglycerides   4. Honor pt food preferences to promote adequate oral intake while in-house     Monitoring and Evaluation:   Continue to monitor nutritional intake, tolerance to diet prescription, weights, labs, skin integrity    RD remains available upon request and will follow up per protocol  Sultana Velazquez MS, RD, CDN, CNSC avail. on MS Teams

## 2023-12-04 NOTE — PROGRESS NOTE ADULT - PROBLEM SELECTOR PLAN 3
- PMVT into VF arrest 11/8, 3 rounds with shocks  - Transitioned from IV Lidocaine infusion to PO Amio load 11/14  -currently on amio  -monitor LFTs  - IABP support as above  - EP following.

## 2023-12-04 NOTE — PROGRESS NOTE ADULT - PROBLEM SELECTOR PLAN 2
Hyponatremia: developed during this hospital course. He is not symptomatic. Urine Osm 471 and Urine Na 88. Given 150cc bolus of 3% saline on 11/24. Na today 133 and pt remained asymptomatic. C/w oral intake of food and limit free water <1-1.5L/day.     If you have any questions, please feel free to contact me  Robert Garcia  Nephrology Fellow  [phone]; Prefer [employer] TEAMS  (After 5pm or on weekends please page the on-call fellow). Hyponatremia: developed during this hospital course. He is not symptomatic. Urine Osm 471 and Urine Na 88. Given 150cc bolus of 3% saline on 11/24. Na today 133 and pt remained asymptomatic. C/w oral intake of food and limit free water <1-1.5L/day.     If you have any questions, please feel free to contact me  Robert Garcia  Nephrology Fellow  [phone]; Prefer [employer] TEAMS  (After 5pm or on weekends please page the on-call fellow).

## 2023-12-04 NOTE — PROGRESS NOTE ADULT - PROBLEM SELECTOR PLAN 5
BCx from 11/17 with GPC in pair/chains in both bottles; Mixed cultures Staph epi and Enterococcus faecalis   Repeat cultures from 11/18; NGTD  Currently on vancomycin. Has remote Hx PCN allergy per ID but unclear as he doesn't recall reaction. Cleared for OHT listing  -s/p IABP exchange from RFA to LFA on 11/20.  -NOW with GM + rods in blood culture on 11/30 (reported on 12/4), please have transplant ID weigh in.

## 2023-12-04 NOTE — PROGRESS NOTE ADULT - PROBLEM SELECTOR PLAN 1
ARIC: in the setting of heart failure, COVID infection. At the time of presentation Scr is 1.25. Started to worsen on 11/4 and peaked at 4.07 11/10 and gradually improved to 1.8 on 11/18. Pt had LHC on 11/1. He has hx of DM with proteinuria of 684 but most recent UA negative. Primary team is planning to get cardiac transplant eval. Remains nonoliguric, UOP 1.7L/24h. HIV, Hep B, Hep C negative. A1c - 8.3. Complements are not low. BETZAIDA and ANCA negative. Light chains - not significant. UPCR <0.1, PLA2R ab - negative, Anti GBM abs - negative. UPEP negative. Duplex - normal, symmetric blood flow throughout both kidneys. Velocities and RIs are limited by IABP.     Serum creatinine improved to 1.4 today. Pts Hemodynamics are managed by cardio team and pt remains on IABP.    PLAN:  No indication for dialysis.  Avoid nephrotoxic agents. Dose meds per eGFR.

## 2023-12-04 NOTE — PROGRESS NOTE ADULT - ASSESSMENT
59 yo male h/o htn, cad s/p pci, ICH, here with NSTEMI  s/p intubation and cath. now in CCU    NSTEMI  s/p  cath  cath results noted. multi vessel dz., CTSx f/u.   hep gtt  pt with vtach/fib arrest again on 11/8. s/p ACLS and ROSC.   now extubated  IABP in place  mngt as per CCU  plan for transplant as per HF and transplant team  transplant w/u ongoing.   s/p colonoscopy 11/17. normal  now listed for transplant as of 11/22    LV thrombus   hep gtt    acute on chronic systolic heart failure  heart failure team f/u    pna  resolved     covid  resolved  now off isolation     h/o ICH  resolved    agitation  psy consult f/u    bacteremia  id following  iv abx  f/u repeat cult      vomiting and abd pain  ileus on CT  bowel regimen  gi f/u  +bm    now resolved.     IABP malposition on CT  mngt as per ccu. iabp adjusted    mngt as per CCU team          Advanced care planning was discussed with patient and family.  Advanced care planning forms were reviewed and discussed as appropriate.  Differential diagnosis and plan of care discussed with patient after the evaluation.   Pain assessed and judicious use of narcotics when appropriate was discussed.  Importance of Fall prevention discussed.  Counseling on Smoking and Alcohol cessation was offered when appropriate.  Counseling on Diet, exercise, and medication compliance was done.       Approx 75 minutes spent. 57 yo male h/o htn, cad s/p pci, ICH, here with NSTEMI  s/p intubation and cath. now in CCU    NSTEMI  s/p  cath  cath results noted. multi vessel dz., CTSx f/u.   hep gtt  pt with vtach/fib arrest again on 11/8. s/p ACLS and ROSC.   now extubated  IABP in place  mngt as per CCU  plan for transplant as per HF and transplant team  transplant w/u ongoing.   s/p colonoscopy 11/17. normal  now listed for transplant as of 11/22    LV thrombus   hep gtt    acute on chronic systolic heart failure  heart failure team f/u    pna  resolved     covid  resolved  now off isolation     h/o ICH  resolved    agitation  psy consult f/u    bacteremia  id following  iv abx  f/u repeat cult      vomiting and abd pain  ileus on CT  bowel regimen  gi f/u  +bm    now resolved.     IABP malposition on CT  mngt as per ccu. iabp adjusted    mngt as per CCU team          Advanced care planning was discussed with patient and family.  Advanced care planning forms were reviewed and discussed as appropriate.  Differential diagnosis and plan of care discussed with patient after the evaluation.   Pain assessed and judicious use of narcotics when appropriate was discussed.  Importance of Fall prevention discussed.  Counseling on Smoking and Alcohol cessation was offered when appropriate.  Counseling on Diet, exercise, and medication compliance was done.       Approx 75 minutes spent.

## 2023-12-04 NOTE — PROGRESS NOTE ADULT - SUBJECTIVE AND OBJECTIVE BOX
LD VALENCIA  58y Male  MRN:[id number]    Patient is a 58y old  Male who presents with a chief complaint of NSTEMI (01 Nov 2023 20:29)    HPI:  57yo M w/ hx HTN, CAD w/ 1 stent in 2009, ICH (2008) presenting with abn ekg. Patient presented to [facility] where he was found to have STEMI, recommended to get cath however patient did not want to get it there so it left and came here.  Patient initially had cough, congestion, fever, was placed on antibiotics on Sunday.  Started feeling nauseous and had a presyncopal event after which he presented to ED last night.  Had chest pain as well.  Chest pain is midsternal.  Not currently having chest pain.  Received 4 aspirin 30 min pta. (01 Nov 2023 15:11)      Patient seen and evaluated at bedside in CCU. interval events noted    Interval HPI: remain in ccu. IABP in place        PAST MEDICAL & SURGICAL HISTORY:  HTN (hypertension)      CAD (coronary artery disease)  2009; stent      Intracranial hemorrhage  2008      Respiratory arrest  december 1st      Myocardial infarction, unspecified MI type, unspecified artery      History of coronary artery stent placement          REVIEW OF SYSTEMS:  as per hpi     VITALS:   ICU Vital Signs Last 24 Hrs  T(C): 36.9 (04 Dec 2023 20:00), Max: 36.9 (03 Dec 2023 23:00)  T(F): 98.4 (04 Dec 2023 20:00), Max: 98.5 (03 Dec 2023 23:00)  HR: 76 (04 Dec 2023 22:00) (71 - 82)  BP: --  BP(mean): --  ABP: --  ABP(mean): --  RR: 17 (04 Dec 2023 22:00) (13 - 25)  SpO2: 100% (04 Dec 2023 22:00) (97% - 100%)    O2 Parameters below as of 04 Dec 2023 20:40  Patient On (Oxygen Delivery Method): room air            PHYSICAL EXAM:  GENERAL: NAD, comfortable   HEAD:  Atraumatic, Normocephalic  EYES: EOMI, PERRLA, conjunctiva and sclera clear  NECK: Supple, No JVD   CHEST/LUNG: Clear to auscultation bilaterally; No wheeze  HEART: Regular rate and rhythm; No murmurs, rubs, or gallops  ABDOMEN: Soft, Nontender, Nondistended; Bowel sounds present  EXTREMITIES:  2+ Peripheral Pulses, No clubbing, cyanosis, or edema  NEUROLOGY: nonfocal  SKIN: No rashes or lesions  +iabp    Consultant(s) Notes Reviewed:  [x ] YES  [ ] NO  Care Discussed with Consultants/Other Providers [ x] YES  [ ] NO    MEDS:   MEDICATIONS  (STANDING):  aMIOdarone    Tablet   Oral   aMIOdarone    Tablet 200 milliGRAM(s) Oral daily  aspirin  chewable 81 milliGRAM(s) Oral daily  atorvastatin 80 milliGRAM(s) Oral at bedtime  budesonide  80 MICROgram(s)/formoterol 4.5 MICROgram(s) Inhaler 2 Puff(s) Inhalation two times a day  carvedilol 25 milliGRAM(s) Oral every 12 hours  chlorhexidine 2% Cloths 1 Application(s) Topical daily  heparin  Infusion 1300 Unit(s)/Hr (14 mL/Hr) IV Continuous <Continuous>  hydrALAZINE 100 milliGRAM(s) Oral three times a day  insulin glargine Injectable (LANTUS) 12 Unit(s) SubCutaneous at bedtime  insulin lispro (ADMELOG) corrective regimen sliding scale   SubCutaneous at bedtime  insulin lispro (ADMELOG) corrective regimen sliding scale   SubCutaneous three times a day before meals  insulin lispro Injectable (ADMELOG) 9 Unit(s) SubCutaneous three times a day before meals  isosorbide   dinitrate Tablet (ISORDIL) 40 milliGRAM(s) Oral three times a day  polyethylene glycol 3350 17 Gram(s) Oral two times a day  senna 2 Tablet(s) Oral at bedtime    MEDICATIONS  (PRN):  hydrOXYzine hydrochloride 25 milliGRAM(s) Oral two times a day PRN Anxiety    ALLERGIES:  penicillins (Unknown)      LABS:                                                 11.6   7.33  )-----------( 133      ( 04 Dec 2023 00:40 )             34.7   12-04    132<L>  |  102  |  26<H>  ----------------------------<  201<H>  3.8   |  19<L>  |  1.12    Ca    9.5      04 Dec 2023 14:42  Phos  2.9     12-04  Mg     1.8     12-04    TPro  6.5  /  Alb  3.6  /  TBili  0.2  /  DBili  x   /  AST  40  /  ALT  97<H>  /  AlkPhos  59  12-04      < from: CT Abdomen and Pelvis No Cont (11.28.23 @ 03:38) >  IMPRESSION:  Mildly dilated colon and prominent but not overly dilated small bowel   with air-fluid levels. No discrete transition point. Findings are   suggestive of ileus.    Intra-aortic balloon pump with the inferior marker at the level of the   inferior mesenteric artery and the balloon overlying the origins of the   renal arteries, celiac artery, and superior mesenteric artery. Consider   repositioning. Concern for IABP positioning was discussed with LANETTE Hawthorne   on 11/28/2023 at 3:42 AM by Dr. Shepard.    < end of copied text >          < from: Colonoscopy (11.17.23 @ 10:35) >                                                                                                        Impression:          - The entire examined colon is normal on direct and retroflexion views.                       - No specimens collected.  Recommendation:      - Resume previous diet today.                       - No large polyps or masses detected, No objection from GI standpoint to                 proceed with heart transplantation/advanced heart therapies.                       - Please call back should any further questions or concerns arise.    < end of copied text >     < from: Xray Chest 1 View- PORTABLE-Urgent (11.01.23 @ 07:42) >    IMPRESSION:  Clear lungs.    ---End of Report ---        < end of copied text >  < from: TTE W or WO Ultrasound Enhancing Agent (11.01.23 @ 10:23) >  _____________________________     CONCLUSIONS:      1. Left ventricular cavity is moderately dilated. Left ventricular wall thickness is normal. Left ventricular systolic function is severely decreased with an ejection fraction of 32 % by Chinchilla's method of disks. Regional wall motion abnormalities present.   2. Multiple segmental abnormalities exist. See findings.   3. There is moderate (grade 2) left ventricular diastolic dysfunction, with indeterminant filling pressure.   4. Normal right ventricular cavity size, wall thickness, and systolic function.   5. No significant valvular disease.   6. No pericardial effusion seen.   7. Compared to the transthoracic echocardiogram performed on 1/25/2017 the areas of akinesis are unchanged but there has been a decline in LV systolic function with new areas of hypokinesis.    __________________________________________________________________    < end of copied text >  < from: TTE Limited W or WO Ultrasound Enhancing Agent (11.02.23 @ 07:41) >  __________________________     CONCLUSIONS:      1. After obtaining consent, Definity ultrasound enhancing agent was given for enhanced left ventricular opacification and improved delineation of the left ventricular endocardial borders. Left ventricular systolic function is severely decreased with a calculated ejection fraction of 22 % by the Chinchilla's biplane method of disks. There is a left ventricular thrombus.   2. Findings were discussed with Litzy BOSS on 11/2/2023 at 8.49am.   3. There is a left ventricular thrombus.    _________________________________________________________________    < end of copied text >   LD VALENCIA  58y Male  MRN:[id number]    Patient is a 58y old  Male who presents with a chief complaint of NSTEMI (01 Nov 2023 20:29)    HPI:  57yo M w/ hx HTN, CAD w/ 1 stent in 2009, ICH (2008) presenting with abn ekg. Patient presented to [facility] where he was found to have STEMI, recommended to get cath however patient did not want to get it there so it left and came here.  Patient initially had cough, congestion, fever, was placed on antibiotics on Sunday.  Started feeling nauseous and had a presyncopal event after which he presented to ED last night.  Had chest pain as well.  Chest pain is midsternal.  Not currently having chest pain.  Received 4 aspirin 30 min pta. (01 Nov 2023 15:11)      Patient seen and evaluated at bedside in CCU. interval events noted    Interval HPI: remain in ccu. IABP in place        PAST MEDICAL & SURGICAL HISTORY:  HTN (hypertension)      CAD (coronary artery disease)  2009; stent      Intracranial hemorrhage  2008      Respiratory arrest  december 1st      Myocardial infarction, unspecified MI type, unspecified artery      History of coronary artery stent placement          REVIEW OF SYSTEMS:  as per hpi     VITALS:   ICU Vital Signs Last 24 Hrs  T(C): 36.9 (04 Dec 2023 20:00), Max: 36.9 (03 Dec 2023 23:00)  T(F): 98.4 (04 Dec 2023 20:00), Max: 98.5 (03 Dec 2023 23:00)  HR: 76 (04 Dec 2023 22:00) (71 - 82)  BP: --  BP(mean): --  ABP: --  ABP(mean): --  RR: 17 (04 Dec 2023 22:00) (13 - 25)  SpO2: 100% (04 Dec 2023 22:00) (97% - 100%)    O2 Parameters below as of 04 Dec 2023 20:40  Patient On (Oxygen Delivery Method): room air            PHYSICAL EXAM:  GENERAL: NAD, comfortable   HEAD:  Atraumatic, Normocephalic  EYES: EOMI, PERRLA, conjunctiva and sclera clear  NECK: Supple, No JVD   CHEST/LUNG: Clear to auscultation bilaterally; No wheeze  HEART: Regular rate and rhythm; No murmurs, rubs, or gallops  ABDOMEN: Soft, Nontender, Nondistended; Bowel sounds present  EXTREMITIES:  2+ Peripheral Pulses, No clubbing, cyanosis, or edema  NEUROLOGY: nonfocal  SKIN: No rashes or lesions  +iabp    Consultant(s) Notes Reviewed:  [x ] YES  [ ] NO  Care Discussed with Consultants/Other Providers [ x] YES  [ ] NO    MEDS:   MEDICATIONS  (STANDING):  aMIOdarone    Tablet   Oral   aMIOdarone    Tablet 200 milliGRAM(s) Oral daily  aspirin  chewable 81 milliGRAM(s) Oral daily  atorvastatin 80 milliGRAM(s) Oral at bedtime  budesonide  80 MICROgram(s)/formoterol 4.5 MICROgram(s) Inhaler 2 Puff(s) Inhalation two times a day  carvedilol 25 milliGRAM(s) Oral every 12 hours  chlorhexidine 2% Cloths 1 Application(s) Topical daily  heparin  Infusion 1300 Unit(s)/Hr (14 mL/Hr) IV Continuous <Continuous>  hydrALAZINE 100 milliGRAM(s) Oral three times a day  insulin glargine Injectable (LANTUS) 12 Unit(s) SubCutaneous at bedtime  insulin lispro (ADMELOG) corrective regimen sliding scale   SubCutaneous at bedtime  insulin lispro (ADMELOG) corrective regimen sliding scale   SubCutaneous three times a day before meals  insulin lispro Injectable (ADMELOG) 9 Unit(s) SubCutaneous three times a day before meals  isosorbide   dinitrate Tablet (ISORDIL) 40 milliGRAM(s) Oral three times a day  polyethylene glycol 3350 17 Gram(s) Oral two times a day  senna 2 Tablet(s) Oral at bedtime    MEDICATIONS  (PRN):  hydrOXYzine hydrochloride 25 milliGRAM(s) Oral two times a day PRN Anxiety    ALLERGIES:  penicillins (Unknown)      LABS:                                                 11.6   7.33  )-----------( 133      ( 04 Dec 2023 00:40 )             34.7   12-04    132<L>  |  102  |  26<H>  ----------------------------<  201<H>  3.8   |  19<L>  |  1.12    Ca    9.5      04 Dec 2023 14:42  Phos  2.9     12-04  Mg     1.8     12-04    TPro  6.5  /  Alb  3.6  /  TBili  0.2  /  DBili  x   /  AST  40  /  ALT  97<H>  /  AlkPhos  59  12-04      < from: CT Abdomen and Pelvis No Cont (11.28.23 @ 03:38) >  IMPRESSION:  Mildly dilated colon and prominent but not overly dilated small bowel   with air-fluid levels. No discrete transition point. Findings are   suggestive of ileus.    Intra-aortic balloon pump with the inferior marker at the level of the   inferior mesenteric artery and the balloon overlying the origins of the   renal arteries, celiac artery, and superior mesenteric artery. Consider   repositioning. Concern for IABP positioning was discussed with LANETTE Hawthorne   on 11/28/2023 at 3:42 AM by Dr. Shepard.    < end of copied text >          < from: Colonoscopy (11.17.23 @ 10:35) >                                                                                                        Impression:          - The entire examined colon is normal on direct and retroflexion views.                       - No specimens collected.  Recommendation:      - Resume previous diet today.                       - No large polyps or masses detected, No objection from GI standpoint to                 proceed with heart transplantation/advanced heart therapies.                       - Please call back should any further questions or concerns arise.    < end of copied text >     < from: Xray Chest 1 View- PORTABLE-Urgent (11.01.23 @ 07:42) >    IMPRESSION:  Clear lungs.    ---End of Report ---        < end of copied text >  < from: TTE W or WO Ultrasound Enhancing Agent (11.01.23 @ 10:23) >  _____________________________     CONCLUSIONS:      1. Left ventricular cavity is moderately dilated. Left ventricular wall thickness is normal. Left ventricular systolic function is severely decreased with an ejection fraction of 32 % by Chinchilla's method of disks. Regional wall motion abnormalities present.   2. Multiple segmental abnormalities exist. See findings.   3. There is moderate (grade 2) left ventricular diastolic dysfunction, with indeterminant filling pressure.   4. Normal right ventricular cavity size, wall thickness, and systolic function.   5. No significant valvular disease.   6. No pericardial effusion seen.   7. Compared to the transthoracic echocardiogram performed on 1/25/2017 the areas of akinesis are unchanged but there has been a decline in LV systolic function with new areas of hypokinesis.    __________________________________________________________________    < end of copied text >  < from: TTE Limited W or WO Ultrasound Enhancing Agent (11.02.23 @ 07:41) >  __________________________     CONCLUSIONS:      1. After obtaining consent, Definity ultrasound enhancing agent was given for enhanced left ventricular opacification and improved delineation of the left ventricular endocardial borders. Left ventricular systolic function is severely decreased with a calculated ejection fraction of 22 % by the Chinchilla's biplane method of disks. There is a left ventricular thrombus.   2. Findings were discussed with Litzy BOSS on 11/2/2023 at 8.49am.   3. There is a left ventricular thrombus.    _________________________________________________________________    < end of copied text >

## 2023-12-04 NOTE — PROGRESS NOTE ADULT - ATTENDING COMMENTS
endurance decreased 57 y/o M with h/o CAD s/p remote PCI, HFrEF with LVEF 32%, HTN, DM, CVA admitted on November 1st. He was brought to OSH after having syncopal episode at home; He received a shock for rhythm to 180s with hypotension which improved with intervention. Presumably, he was found to have STEMI at OSH but the patient refused to stay for further care and left AMA as he wanted to receive care at [facility]. On presentation, his ECG showed Q waves in anterior leads. He was taken to the cath lab but required intubation for respiratory distress. Angiogram showed  of LAD and RCA. RHC showed elevated filling pressures with CI of 1.5. An IABP was placed. He was deemed not a surgical candidate. Possible consideration for percutaneous intervention pending cardiac viability. His hemodynamics stabilized and IABP was removed on 11/7. On 11/8, he went into VT arrest that required CPR and defibrillation. He was stabilized on amiodarone and lidocaine and on 11/9 had the IABP placed back. His course was c/b ATN with peak creatinine of 4.0 which resolved. He was eventually extubated. No neuro deficit. Repeat TTE showed LVEF low 20s and new apical thrombus. He was approved for OHT. His course was further complicated by Enterococcus faecalis bacteremia, now cleared.     Patient found in bed in [facility], he is in good spirit today. He remains on IABP support (11/20) and listed as status 2 for OHT. He is ABO A and PRA on 11/13 were 0%. Additionally, he is on carvedilol 25 mg BID, hydralazine/ISDN 100/40 mg TID. No need for diuretics as he remains euvolemic, POCUS shows IVC ~2 cm and collapsing. He is on amiodarone for VT. No recent events on telemetry. Labs show stable renal function and slightly elevation in LFTs. Bcx from 11/30 shows Gram positive rods in anaerobic bottle. Discussed with transplant ID and even though this could be a contaminant, the safest course of action is to repeat Bcx, cover with Abx and make the patient status 7 while waiting.     Status 7 until repeat cultures result   Cover with Abx per ID recs   Continue IABP support and GDMT with BB/hydralazine/ ISDN   Continue heparin gtt   No need for diuretics   Agree with stopping amiodarone due to elevated LFTs   Discussed with CCU team and transplant team 59 y/o M with h/o CAD s/p remote PCI, HFrEF with LVEF 32%, HTN, DM, CVA admitted on November 1st. He was brought to OSH after having syncopal episode at home; He received a shock for rhythm to 180s with hypotension which improved with intervention. Presumably, he was found to have STEMI at OSH but the patient refused to stay for further care and left AMA as he wanted to receive care at [facility]. On presentation, his ECG showed Q waves in anterior leads. He was taken to the cath lab but required intubation for respiratory distress. Angiogram showed  of LAD and RCA. RHC showed elevated filling pressures with CI of 1.5. An IABP was placed. He was deemed not a surgical candidate. Possible consideration for percutaneous intervention pending cardiac viability. His hemodynamics stabilized and IABP was removed on 11/7. On 11/8, he went into VT arrest that required CPR and defibrillation. He was stabilized on amiodarone and lidocaine and on 11/9 had the IABP placed back. His course was c/b ATN with peak creatinine of 4.0 which resolved. He was eventually extubated. No neuro deficit. Repeat TTE showed LVEF low 20s and new apical thrombus. He was approved for OHT. His course was further complicated by Enterococcus faecalis bacteremia, now cleared.     Patient found in bed in [facility], he is in good spirit today. He remains on IABP support (11/20) and listed as status 2 for OHT. He is ABO A and PRA on 11/13 were 0%. Additionally, he is on carvedilol 25 mg BID, hydralazine/ISDN 100/40 mg TID. No need for diuretics as he remains euvolemic, POCUS shows IVC ~2 cm and collapsing. He is on amiodarone for VT. No recent events on telemetry. Labs show stable renal function and slightly elevation in LFTs. Bcx from 11/30 shows Gram positive rods in anaerobic bottle. Discussed with transplant ID and even though this could be a contaminant, the safest course of action is to repeat Bcx, cover with Abx and make the patient status 7 while waiting.     Status 7 until repeat cultures result   Cover with Abx per ID recs   Continue IABP support and GDMT with BB/hydralazine/ ISDN   Continue heparin gtt   No need for diuretics   Agree with stopping amiodarone due to elevated LFTs   Discussed with CCU team and transplant team

## 2023-12-05 LAB
ALBUMIN SERPL ELPH-MCNC: 3.7 G/DL — SIGNIFICANT CHANGE UP (ref 3.3–5)
ALBUMIN SERPL ELPH-MCNC: 3.7 G/DL — SIGNIFICANT CHANGE UP (ref 3.3–5)
ALP SERPL-CCNC: 60 U/L — SIGNIFICANT CHANGE UP (ref 40–120)
ALP SERPL-CCNC: 60 U/L — SIGNIFICANT CHANGE UP (ref 40–120)
ALT FLD-CCNC: 101 U/L — HIGH (ref 10–45)
ALT FLD-CCNC: 101 U/L — HIGH (ref 10–45)
ANION GAP SERPL CALC-SCNC: 11 MMOL/L — SIGNIFICANT CHANGE UP (ref 5–17)
ANION GAP SERPL CALC-SCNC: 11 MMOL/L — SIGNIFICANT CHANGE UP (ref 5–17)
APTT BLD: 69.2 SEC — HIGH (ref 24.5–35.6)
APTT BLD: 69.2 SEC — HIGH (ref 24.5–35.6)
AST SERPL-CCNC: 36 U/L — SIGNIFICANT CHANGE UP (ref 10–40)
AST SERPL-CCNC: 36 U/L — SIGNIFICANT CHANGE UP (ref 10–40)
BASOPHILS # BLD AUTO: 0.01 K/UL — SIGNIFICANT CHANGE UP (ref 0–0.2)
BASOPHILS # BLD AUTO: 0.01 K/UL — SIGNIFICANT CHANGE UP (ref 0–0.2)
BASOPHILS NFR BLD AUTO: 0.1 % — SIGNIFICANT CHANGE UP (ref 0–2)
BASOPHILS NFR BLD AUTO: 0.1 % — SIGNIFICANT CHANGE UP (ref 0–2)
BILIRUB SERPL-MCNC: 0.3 MG/DL — SIGNIFICANT CHANGE UP (ref 0.2–1.2)
BILIRUB SERPL-MCNC: 0.3 MG/DL — SIGNIFICANT CHANGE UP (ref 0.2–1.2)
BUN SERPL-MCNC: 26 MG/DL — HIGH (ref 7–23)
BUN SERPL-MCNC: 26 MG/DL — HIGH (ref 7–23)
CALCIUM SERPL-MCNC: 9.4 MG/DL — SIGNIFICANT CHANGE UP (ref 8.4–10.5)
CALCIUM SERPL-MCNC: 9.4 MG/DL — SIGNIFICANT CHANGE UP (ref 8.4–10.5)
CHLORIDE SERPL-SCNC: 102 MMOL/L — SIGNIFICANT CHANGE UP (ref 96–108)
CHLORIDE SERPL-SCNC: 102 MMOL/L — SIGNIFICANT CHANGE UP (ref 96–108)
CO2 SERPL-SCNC: 19 MMOL/L — LOW (ref 22–31)
CO2 SERPL-SCNC: 19 MMOL/L — LOW (ref 22–31)
CREAT SERPL-MCNC: 1.3 MG/DL — SIGNIFICANT CHANGE UP (ref 0.5–1.3)
CREAT SERPL-MCNC: 1.3 MG/DL — SIGNIFICANT CHANGE UP (ref 0.5–1.3)
EGFR: 63 ML/MIN/1.73M2 — SIGNIFICANT CHANGE UP
EGFR: 63 ML/MIN/1.73M2 — SIGNIFICANT CHANGE UP
EOSINOPHIL # BLD AUTO: 0.4 K/UL — SIGNIFICANT CHANGE UP (ref 0–0.5)
EOSINOPHIL # BLD AUTO: 0.4 K/UL — SIGNIFICANT CHANGE UP (ref 0–0.5)
EOSINOPHIL NFR BLD AUTO: 5.2 % — SIGNIFICANT CHANGE UP (ref 0–6)
EOSINOPHIL NFR BLD AUTO: 5.2 % — SIGNIFICANT CHANGE UP (ref 0–6)
GLUCOSE BLDC GLUCOMTR-MCNC: 117 MG/DL — HIGH (ref 70–99)
GLUCOSE BLDC GLUCOMTR-MCNC: 117 MG/DL — HIGH (ref 70–99)
GLUCOSE BLDC GLUCOMTR-MCNC: 129 MG/DL — HIGH (ref 70–99)
GLUCOSE BLDC GLUCOMTR-MCNC: 129 MG/DL — HIGH (ref 70–99)
GLUCOSE BLDC GLUCOMTR-MCNC: 149 MG/DL — HIGH (ref 70–99)
GLUCOSE BLDC GLUCOMTR-MCNC: 149 MG/DL — HIGH (ref 70–99)
GLUCOSE BLDC GLUCOMTR-MCNC: 98 MG/DL — SIGNIFICANT CHANGE UP (ref 70–99)
GLUCOSE BLDC GLUCOMTR-MCNC: 98 MG/DL — SIGNIFICANT CHANGE UP (ref 70–99)
GLUCOSE SERPL-MCNC: 130 MG/DL — HIGH (ref 70–99)
GLUCOSE SERPL-MCNC: 130 MG/DL — HIGH (ref 70–99)
HCT VFR BLD CALC: 35.9 % — LOW (ref 39–50)
HCT VFR BLD CALC: 35.9 % — LOW (ref 39–50)
HGB BLD-MCNC: 12 G/DL — LOW (ref 13–17)
HGB BLD-MCNC: 12 G/DL — LOW (ref 13–17)
IMM GRANULOCYTES NFR BLD AUTO: 0.3 % — SIGNIFICANT CHANGE UP (ref 0–0.9)
IMM GRANULOCYTES NFR BLD AUTO: 0.3 % — SIGNIFICANT CHANGE UP (ref 0–0.9)
INR BLD: 1.11 RATIO — SIGNIFICANT CHANGE UP (ref 0.85–1.18)
INR BLD: 1.11 RATIO — SIGNIFICANT CHANGE UP (ref 0.85–1.18)
LACTATE SERPL-SCNC: 1.3 MMOL/L — SIGNIFICANT CHANGE UP (ref 0.5–2)
LACTATE SERPL-SCNC: 1.3 MMOL/L — SIGNIFICANT CHANGE UP (ref 0.5–2)
LYMPHOCYTES # BLD AUTO: 0.79 K/UL — LOW (ref 1–3.3)
LYMPHOCYTES # BLD AUTO: 0.79 K/UL — LOW (ref 1–3.3)
LYMPHOCYTES # BLD AUTO: 10.3 % — LOW (ref 13–44)
LYMPHOCYTES # BLD AUTO: 10.3 % — LOW (ref 13–44)
MAGNESIUM SERPL-MCNC: 2 MG/DL — SIGNIFICANT CHANGE UP (ref 1.6–2.6)
MAGNESIUM SERPL-MCNC: 2 MG/DL — SIGNIFICANT CHANGE UP (ref 1.6–2.6)
MCHC RBC-ENTMCNC: 29.9 PG — SIGNIFICANT CHANGE UP (ref 27–34)
MCHC RBC-ENTMCNC: 29.9 PG — SIGNIFICANT CHANGE UP (ref 27–34)
MCHC RBC-ENTMCNC: 33.4 GM/DL — SIGNIFICANT CHANGE UP (ref 32–36)
MCHC RBC-ENTMCNC: 33.4 GM/DL — SIGNIFICANT CHANGE UP (ref 32–36)
MCV RBC AUTO: 89.3 FL — SIGNIFICANT CHANGE UP (ref 80–100)
MCV RBC AUTO: 89.3 FL — SIGNIFICANT CHANGE UP (ref 80–100)
MONOCYTES # BLD AUTO: 0.42 K/UL — SIGNIFICANT CHANGE UP (ref 0–0.9)
MONOCYTES # BLD AUTO: 0.42 K/UL — SIGNIFICANT CHANGE UP (ref 0–0.9)
MONOCYTES NFR BLD AUTO: 5.5 % — SIGNIFICANT CHANGE UP (ref 2–14)
MONOCYTES NFR BLD AUTO: 5.5 % — SIGNIFICANT CHANGE UP (ref 2–14)
NEUTROPHILS # BLD AUTO: 6.02 K/UL — SIGNIFICANT CHANGE UP (ref 1.8–7.4)
NEUTROPHILS # BLD AUTO: 6.02 K/UL — SIGNIFICANT CHANGE UP (ref 1.8–7.4)
NEUTROPHILS NFR BLD AUTO: 78.6 % — HIGH (ref 43–77)
NEUTROPHILS NFR BLD AUTO: 78.6 % — HIGH (ref 43–77)
NRBC # BLD: 0 /100 WBCS — SIGNIFICANT CHANGE UP (ref 0–0)
NRBC # BLD: 0 /100 WBCS — SIGNIFICANT CHANGE UP (ref 0–0)
PHOSPHATE SERPL-MCNC: 3.3 MG/DL — SIGNIFICANT CHANGE UP (ref 2.5–4.5)
PHOSPHATE SERPL-MCNC: 3.3 MG/DL — SIGNIFICANT CHANGE UP (ref 2.5–4.5)
PLATELET # BLD AUTO: 132 K/UL — LOW (ref 150–400)
PLATELET # BLD AUTO: 132 K/UL — LOW (ref 150–400)
POTASSIUM SERPL-MCNC: 4.4 MMOL/L — SIGNIFICANT CHANGE UP (ref 3.5–5.3)
POTASSIUM SERPL-MCNC: 4.4 MMOL/L — SIGNIFICANT CHANGE UP (ref 3.5–5.3)
POTASSIUM SERPL-SCNC: 4.4 MMOL/L — SIGNIFICANT CHANGE UP (ref 3.5–5.3)
POTASSIUM SERPL-SCNC: 4.4 MMOL/L — SIGNIFICANT CHANGE UP (ref 3.5–5.3)
PROT SERPL-MCNC: 6.6 G/DL — SIGNIFICANT CHANGE UP (ref 6–8.3)
PROT SERPL-MCNC: 6.6 G/DL — SIGNIFICANT CHANGE UP (ref 6–8.3)
PROTHROM AB SERPL-ACNC: 11.6 SEC — SIGNIFICANT CHANGE UP (ref 9.5–13)
PROTHROM AB SERPL-ACNC: 11.6 SEC — SIGNIFICANT CHANGE UP (ref 9.5–13)
RAPID RVP RESULT: SIGNIFICANT CHANGE UP
RAPID RVP RESULT: SIGNIFICANT CHANGE UP
RBC # BLD: 4.02 M/UL — LOW (ref 4.2–5.8)
RBC # BLD: 4.02 M/UL — LOW (ref 4.2–5.8)
RBC # FLD: 15.1 % — HIGH (ref 10.3–14.5)
RBC # FLD: 15.1 % — HIGH (ref 10.3–14.5)
SARS-COV-2 RNA SPEC QL NAA+PROBE: SIGNIFICANT CHANGE UP
SARS-COV-2 RNA SPEC QL NAA+PROBE: SIGNIFICANT CHANGE UP
SODIUM SERPL-SCNC: 132 MMOL/L — LOW (ref 135–145)
SODIUM SERPL-SCNC: 132 MMOL/L — LOW (ref 135–145)
UFH PPP CHRO-ACNC: 0.49 IU/ML — SIGNIFICANT CHANGE UP (ref 0.3–0.7)
UFH PPP CHRO-ACNC: 0.49 IU/ML — SIGNIFICANT CHANGE UP (ref 0.3–0.7)
VANCOMYCIN FLD-MCNC: 6.9 UG/ML — SIGNIFICANT CHANGE UP
VANCOMYCIN FLD-MCNC: 6.9 UG/ML — SIGNIFICANT CHANGE UP
WBC # BLD: 7.66 K/UL — SIGNIFICANT CHANGE UP (ref 3.8–10.5)
WBC # BLD: 7.66 K/UL — SIGNIFICANT CHANGE UP (ref 3.8–10.5)
WBC # FLD AUTO: 7.66 K/UL — SIGNIFICANT CHANGE UP (ref 3.8–10.5)
WBC # FLD AUTO: 7.66 K/UL — SIGNIFICANT CHANGE UP (ref 3.8–10.5)

## 2023-12-05 PROCEDURE — 99232 SBSQ HOSP IP/OBS MODERATE 35: CPT | Mod: GC

## 2023-12-05 PROCEDURE — 99291 CRITICAL CARE FIRST HOUR: CPT

## 2023-12-05 PROCEDURE — 99291 CRITICAL CARE FIRST HOUR: CPT | Mod: GC,25

## 2023-12-05 PROCEDURE — 93010 ELECTROCARDIOGRAM REPORT: CPT

## 2023-12-05 PROCEDURE — 99233 SBSQ HOSP IP/OBS HIGH 50: CPT

## 2023-12-05 PROCEDURE — 71045 X-RAY EXAM CHEST 1 VIEW: CPT | Mod: 26

## 2023-12-05 PROCEDURE — 99223 1ST HOSP IP/OBS HIGH 75: CPT

## 2023-12-05 RX ORDER — VANCOMYCIN HCL 1 G
1500 VIAL (EA) INTRAVENOUS ONCE
Refills: 0 | Status: COMPLETED | OUTPATIENT
Start: 2023-12-05 | End: 2023-12-05

## 2023-12-05 RX ORDER — ISOSORBIDE DINITRATE 5 MG/1
30 TABLET ORAL THREE TIMES A DAY
Refills: 0 | Status: DISCONTINUED | OUTPATIENT
Start: 2023-12-05 | End: 2023-12-25

## 2023-12-05 RX ORDER — HYDRALAZINE HCL 50 MG
75 TABLET ORAL THREE TIMES A DAY
Refills: 0 | Status: DISCONTINUED | OUTPATIENT
Start: 2023-12-05 | End: 2023-12-14

## 2023-12-05 RX ADMIN — HEPARIN SODIUM 14 UNIT(S)/HR: 5000 INJECTION INTRAVENOUS; SUBCUTANEOUS at 05:58

## 2023-12-05 RX ADMIN — HEPARIN SODIUM 14 UNIT(S)/HR: 5000 INJECTION INTRAVENOUS; SUBCUTANEOUS at 19:45

## 2023-12-05 RX ADMIN — Medication 9 UNIT(S): at 17:07

## 2023-12-05 RX ADMIN — POLYETHYLENE GLYCOL 3350 17 GRAM(S): 17 POWDER, FOR SOLUTION ORAL at 08:34

## 2023-12-05 RX ADMIN — Medication 300 MILLIGRAM(S): at 16:35

## 2023-12-05 RX ADMIN — ATORVASTATIN CALCIUM 80 MILLIGRAM(S): 80 TABLET, FILM COATED ORAL at 22:24

## 2023-12-05 RX ADMIN — Medication 9 UNIT(S): at 08:35

## 2023-12-05 RX ADMIN — Medication 81 MILLIGRAM(S): at 12:49

## 2023-12-05 RX ADMIN — CHLORHEXIDINE GLUCONATE 1 APPLICATION(S): 213 SOLUTION TOPICAL at 22:34

## 2023-12-05 RX ADMIN — BUDESONIDE AND FORMOTEROL FUMARATE DIHYDRATE 2 PUFF(S): 160; 4.5 AEROSOL RESPIRATORY (INHALATION) at 08:44

## 2023-12-05 RX ADMIN — Medication 0: at 22:25

## 2023-12-05 RX ADMIN — ISOSORBIDE DINITRATE 40 MILLIGRAM(S): 5 TABLET ORAL at 08:36

## 2023-12-05 RX ADMIN — Medication 100 MILLIGRAM(S): at 05:57

## 2023-12-05 RX ADMIN — Medication 75 MILLIGRAM(S): at 22:26

## 2023-12-05 RX ADMIN — CARVEDILOL PHOSPHATE 25 MILLIGRAM(S): 80 CAPSULE, EXTENDED RELEASE ORAL at 05:57

## 2023-12-05 RX ADMIN — CARVEDILOL PHOSPHATE 25 MILLIGRAM(S): 80 CAPSULE, EXTENDED RELEASE ORAL at 17:33

## 2023-12-05 RX ADMIN — INSULIN GLARGINE 12 UNIT(S): 100 INJECTION, SOLUTION SUBCUTANEOUS at 22:25

## 2023-12-05 RX ADMIN — SENNA PLUS 2 TABLET(S): 8.6 TABLET ORAL at 22:25

## 2023-12-05 RX ADMIN — ISOSORBIDE DINITRATE 30 MILLIGRAM(S): 5 TABLET ORAL at 16:35

## 2023-12-05 RX ADMIN — Medication 9 UNIT(S): at 12:48

## 2023-12-05 RX ADMIN — Medication 75 MILLIGRAM(S): at 12:51

## 2023-12-05 NOTE — PROGRESS NOTE ADULT - SUBJECTIVE AND OBJECTIVE BOX
Patient seen and examined at bedside.    Overnight Events: No acute events overnight. Denies chest pain or shortness of breath. Mild worsening of Upper arm rash. Blood culture from 11/30 came back positive for GM + rods yesterday, started on vancomycin, and now listed as status 7.       REVIEW OF SYSTEMS:  Constitutional:     [ x] negative [ ] fevers [ ] chills [ ] weight loss [ ] weight gain  HEENT:                  [ x] negative [ ] dry eyes [ ] eye irritation [ ] postnasal drip [ ] nasal congestion  CV:                         [x ] negative  [ ] chest pain [ ] orthopnea [ ] palpitations [ ] murmur  Resp:                     [ x] negative [ ] cough [ ] shortness of breath [ ] dyspnea [ ] wheezing [ ] sputum [ ]hemoptysis  GI:                          [ x] negative [ ] nausea [ ] vomiting [ ] diarrhea [ ] constipation [ ] abd pain [ ] dysphagia   :                        [ x] negative [ ] dysuria [ ] nocturia [ ] hematuria [ ] increased urinary frequency  Musculoskeletal: [ x] negative [ ] back pain [ ] myalgias [ ] arthralgias [ ] fracture  Skin:                       [ x] negative [ ] rash [ ] itch  Neurological:        [ x] negative [ ] headache [ ] dizziness [ ] syncope [ ] weakness [ ] numbness  Psychiatric:           [ x] negative [ ] anxiety [ ] depression  Endocrine:            [ x] negative [ ] diabetes [ ] thyroid problem  Heme/Lymph:      [ x] negative [ ] anemia [ ] bleeding problem  Allergic/Immune: [x ] negative [ ] itchy eyes [ ] nasal discharge [ ] hives [ ] angioedema    [x ] All other systems negative  [ ] Unable to assess ROS due to    Current Meds:  aMIOdarone    Tablet   Oral   aMIOdarone    Tablet 200 milliGRAM(s) Oral daily  aspirin  chewable 81 milliGRAM(s) Oral daily  atorvastatin 80 milliGRAM(s) Oral at bedtime  budesonide  80 MICROgram(s)/formoterol 4.5 MICROgram(s) Inhaler 2 Puff(s) Inhalation two times a day  carvedilol 25 milliGRAM(s) Oral every 12 hours  chlorhexidine 2% Cloths 1 Application(s) Topical daily  heparin  Infusion 1300 Unit(s)/Hr IV Continuous <Continuous>  hydrALAZINE 100 milliGRAM(s) Oral three times a day  hydrOXYzine hydrochloride 25 milliGRAM(s) Oral two times a day PRN  insulin glargine Injectable (LANTUS) 12 Unit(s) SubCutaneous at bedtime  insulin lispro (ADMELOG) corrective regimen sliding scale   SubCutaneous three times a day before meals  insulin lispro (ADMELOG) corrective regimen sliding scale   SubCutaneous at bedtime  insulin lispro Injectable (ADMELOG) 9 Unit(s) SubCutaneous three times a day before meals  isosorbide   dinitrate Tablet (ISORDIL) 40 milliGRAM(s) Oral three times a day  polyethylene glycol 3350 17 Gram(s) Oral two times a day  senna 2 Tablet(s) Oral at bedtime  vancomycin  IVPB 1500 milliGRAM(s) IV Intermittent once      PAST MEDICAL & SURGICAL HISTORY:  HTN (hypertension)      CAD (coronary artery disease)  2009; stent      Intracranial hemorrhage  2008      Respiratory arrest  december 1st      Myocardial infarction, unspecified MI type, unspecified artery      History of coronary artery stent placement          Vitals:  T(F): 98.5 (12-05), Max: 98.7 (12-04)  HR: 82 (12-05) (73 - 84)  BP: --  RR: 19 (12-05)  SpO2: 98% (12-05)  I&O's Summary    04 Dec 2023 07:01  -  05 Dec 2023 07:00  --------------------------------------------------------  IN: 1291 mL / OUT: 1450 mL / NET: -159 mL    05 Dec 2023 07:01  -  05 Dec 2023 10:24  --------------------------------------------------------  IN: 298 mL / OUT: 300 mL / NET: -2 mL        Physical Exam:  Appearance: No acute distress; well appearing  Eyes: PERRL, EOMI, pink conjunctiva  HENT: Normal oral mucosa  Cardiovascular: RRR, S1, S2, IABP in place.   Respiratory: Clear to auscultation bilaterally  Gastrointestinal: soft, non-tender, non-distended with normal bowel sounds  Musculoskeletal: No clubbing; no joint deformity   Neurologic: Non-focal  Lymphatic: No lymphadenopathy  Psychiatry: AAOx3, mood & affect appropriate  Skin: erythematous rash on bilat UE.                           12.0   7.66  )-----------( 132      ( 05 Dec 2023 02:41 )             35.9     12-05    132<L>  |  102  |  26<H>  ----------------------------<  130<H>  4.4   |  19<L>  |  1.30    Ca    9.4      05 Dec 2023 02:41  Phos  3.3     12-05  Mg     2.0     12-05    TPro  6.6  /  Alb  3.7  /  TBili  0.3  /  DBili  x   /  AST  36  /  ALT  101<H>  /  AlkPhos  60  12-05    PT/INR - ( 05 Dec 2023 02:41 )   PT: 11.6 sec;   INR: 1.11 ratio         PTT - ( 05 Dec 2023 02:41 )  PTT:69.2 sec

## 2023-12-05 NOTE — PROGRESS NOTE ADULT - SUBJECTIVE AND OBJECTIVE BOX
Follow Up:      Interval History:    REVIEW OF SYSTEMS  [  ] ROS unobtainable because:    [  ] All other systems negative except as noted below    Constitutional:  [ ] fever [ ] chills  [ ] weight loss  [ ] weakness  Skin:  [ ] rash [ ] phlebitis	  Eyes: [ ] icterus [ ] pain  [ ] discharge	  ENMT: [ ] sore throat  [ ] thrush [ ] ulcers [ ] exudates  Respiratory: [ ] dyspnea [ ] hemoptysis [ ] cough [ ] sputum	  Cardiovascular:  [ ] chest pain [ ] palpitations [ ] edema	  Gastrointestinal:  [ ] nausea [ ] vomiting [ ] diarrhea [ ] constipation [ ] pain	  Genitourinary:  [ ] dysuria [ ] frequency [ ] hematuria [ ] discharge [ ] flank pain  [ ] incontinence  Musculoskeletal:  [ ] myalgias [ ] arthralgias [ ] arthritis  [ ] back pain  Neurological:  [ ] headache [ ] seizures  [ ] confusion/altered mental status    Allergies  penicillins (Unknown)        ANTIMICROBIALS:  vancomycin  IVPB 1500 once      OTHER MEDS:  MEDICATIONS  (STANDING):  aMIOdarone    Tablet    aMIOdarone    Tablet 200 daily  aspirin  chewable 81 daily  atorvastatin 80 at bedtime  budesonide  80 MICROgram(s)/formoterol 4.5 MICROgram(s) Inhaler 2 two times a day  carvedilol 25 every 12 hours  heparin  Infusion 1300 <Continuous>  hydrALAZINE 75 three times a day  hydrOXYzine hydrochloride 25 two times a day PRN  insulin glargine Injectable (LANTUS) 12 at bedtime  insulin lispro (ADMELOG) corrective regimen sliding scale  at bedtime  insulin lispro (ADMELOG) corrective regimen sliding scale  three times a day before meals  insulin lispro Injectable (ADMELOG) 9 three times a day before meals  isosorbide   dinitrate Tablet (ISORDIL) 30 three times a day  polyethylene glycol 3350 17 two times a day  senna 2 at bedtime      Vital Signs Last 24 Hrs  T(C): 36.9 (05 Dec 2023 11:00), Max: 37.1 (04 Dec 2023 23:00)  T(F): 98.5 (05 Dec 2023 11:00), Max: 98.7 (04 Dec 2023 23:00)  HR: 86 (05 Dec 2023 14:00) (74 - 86)  BP: --  BP(mean): --  RR: 22 (05 Dec 2023 14:00) (15 - 24)  SpO2: 99% (05 Dec 2023 14:00) (97% - 100%)    Parameters below as of 05 Dec 2023 14:00  Patient On (Oxygen Delivery Method): room air        PHYSICAL EXAMINATION:  General: Alert and Awake, NAD  HEENT: PERRL, EOMI  Neck: Supple  Cardiac: RRR, No M/R/G  Resp: CTAB, No Wh/Rh/Ra  Abdomen: NBS, NT/ND, No HSM, No rigidity or guarding  MSK: No LE edema. No Calf tenderness  : No holt  Skin: No rashes or lesions. Skin is warm and dry to the touch.   Neuro: Alert and Awake. CN 2-12 Grossly intact. Moves all four extremities spontaneously.  Psych: Calm, Pleasant, Cooperative                          12.0   7.66  )-----------( 132      ( 05 Dec 2023 02:41 )             35.9       12-05    132<L>  |  102  |  26<H>  ----------------------------<  130<H>  4.4   |  19<L>  |  1.30    Ca    9.4      05 Dec 2023 02:41  Phos  3.3     12-05  Mg     2.0     12-05    TPro  6.6  /  Alb  3.7  /  TBili  0.3  /  DBili  x   /  AST  36  /  ALT  101<H>  /  AlkPhos  60  12-05      Urinalysis Basic - ( 05 Dec 2023 02:41 )    Color: x / Appearance: x / SG: x / pH: x  Gluc: 130 mg/dL / Ketone: x  / Bili: x / Urobili: x   Blood: x / Protein: x / Nitrite: x   Leuk Esterase: x / RBC: x / WBC x   Sq Epi: x / Non Sq Epi: x / Bacteria: x        MICROBIOLOGY:  Vancomycin Level, Random: 6.9 ug/mL (12-05-23 @ 02:41)  v  .Blood Blood  11-30-23   Growth in anaerobic bottle: Gram Positive Rods  Direct identification is available within approximately 3-5  hours either by Blood Panel Multiplexed PCR or Direct  MALDI-TOF. Details: [email or web address]  --  Blood Culture PCR      .Blood Blood-Venous  11-29-23   No growth at 5 days  --  --      .Blood Blood-Peripheral  11-18-23   No growth at 5 days  --  --      .Blood Blood-Peripheral  11-18-23   No growth at 5 days  --  --      Clean Catch Clean Catch (Midstream)  11-18-23   10,000 - 49,000 CFU/mL Enterococcus faecalis  <10,000 CFU/ml Normal Urogenital kaitlynn present  --  Enterococcus faecalis      .Blood Blood  11-17-23   Growth in aerobic and anaerobic bottles: Enterococcus faecalis  See previous culture [id number]  Growth in aerobic bottle: Staphylococcus epidermidis  --  Staphylococcus epidermidis      .Blood Blood  11-17-23   Growth in aerobic bottle: Enterococcus faecalis  Growth in anaerobic bottle: Staphylococcus epidermidis "Susceptibilities  not performed"  Direct identification is available within approximately 3-5  hours either by Blood Panel Multiplexed PCR or Direct  MALDI-TOF. Details: [email or web address]  --  Blood Culture PCR  Blood Culture PCR  Enterococcus faecalis      .Blood Blood-Peripheral  11-10-23   No growth at 5 days  --  --        CMV IgG Antibody: 4.20 U/mL (11-10-23 @ 17:29)  Toxoplasma IgG Screen: <3.0 IU/mL (11-10-23 @ 17:29)  HIV-1 RNA Quantitative, Viral Load Log: NOT DET. lg /mL (11-10-23 @ 17:06)          RADIOLOGY:    <The imaging below has been reviewed and visualized by me independently. Findings as detailed in report below> Follow Up:      Interval History:    REVIEW OF SYSTEMS  [  ] ROS unobtainable because:    [  ] All other systems negative except as noted below    Constitutional:  [ ] fever [ ] chills  [ ] weight loss  [ ] weakness  Skin:  [ ] rash [ ] phlebitis	  Eyes: [ ] icterus [ ] pain  [ ] discharge	  ENMT: [ ] sore throat  [ ] thrush [ ] ulcers [ ] exudates  Respiratory: [ ] dyspnea [ ] hemoptysis [ ] cough [ ] sputum	  Cardiovascular:  [ ] chest pain [ ] palpitations [ ] edema	  Gastrointestinal:  [ ] nausea [ ] vomiting [ ] diarrhea [ ] constipation [ ] pain	  Genitourinary:  [ ] dysuria [ ] frequency [ ] hematuria [ ] discharge [ ] flank pain  [ ] incontinence  Musculoskeletal:  [ ] myalgias [ ] arthralgias [ ] arthritis  [ ] back pain  Neurological:  [ ] headache [ ] seizures  [ ] confusion/altered mental status    Allergies  penicillins (Unknown)        ANTIMICROBIALS:  vancomycin  IVPB 1500 once      OTHER MEDS:  MEDICATIONS  (STANDING):  aMIOdarone    Tablet    aMIOdarone    Tablet 200 daily  aspirin  chewable 81 daily  atorvastatin 80 at bedtime  budesonide  80 MICROgram(s)/formoterol 4.5 MICROgram(s) Inhaler 2 two times a day  carvedilol 25 every 12 hours  heparin  Infusion 1300 <Continuous>  hydrALAZINE 75 three times a day  hydrOXYzine hydrochloride 25 two times a day PRN  insulin glargine Injectable (LANTUS) 12 at bedtime  insulin lispro (ADMELOG) corrective regimen sliding scale  at bedtime  insulin lispro (ADMELOG) corrective regimen sliding scale  three times a day before meals  insulin lispro Injectable (ADMELOG) 9 three times a day before meals  isosorbide   dinitrate Tablet (ISORDIL) 30 three times a day  polyethylene glycol 3350 17 two times a day  senna 2 at bedtime      Vital Signs Last 24 Hrs  T(C): 36.9 (05 Dec 2023 11:00), Max: 37.1 (04 Dec 2023 23:00)  T(F): 98.5 (05 Dec 2023 11:00), Max: 98.7 (04 Dec 2023 23:00)  HR: 86 (05 Dec 2023 14:00) (74 - 86)  BP: --  BP(mean): --  RR: 22 (05 Dec 2023 14:00) (15 - 24)  SpO2: 99% (05 Dec 2023 14:00) (97% - 100%)    Parameters below as of 05 Dec 2023 14:00  Patient On (Oxygen Delivery Method): room air        PHYSICAL EXAMINATION:  General: Alert and Awake, NAD  HEENT: PERRL, EOMI  Neck: Supple  Cardiac: RRR, No M/R/G  Resp: CTAB, No Wh/Rh/Ra  Abdomen: NBS, NT/ND, No HSM, No rigidity or guarding  MSK: No LE edema. No Calf tenderness  : No holt  Skin: No rashes or lesions. Skin is warm and dry to the touch.   Neuro: Alert and Awake. CN 2-12 Grossly intact. Moves all four extremities spontaneously.  Psych: Calm, Pleasant, Cooperative                          12.0   7.66  )-----------( 132      ( 05 Dec 2023 02:41 )             35.9       12-05    132<L>  |  102  |  26<H>  ----------------------------<  130<H>  4.4   |  19<L>  |  1.30    Ca    9.4      05 Dec 2023 02:41  Phos  3.3     12-05  Mg     2.0     12-05    TPro  6.6  /  Alb  3.7  /  TBili  0.3  /  DBili  x   /  AST  36  /  ALT  101<H>  /  AlkPhos  60  12-05      Urinalysis Basic - ( 05 Dec 2023 02:41 )    Color: x / Appearance: x / SG: x / pH: x  Gluc: 130 mg/dL / Ketone: x  / Bili: x / Urobili: x   Blood: x / Protein: x / Nitrite: x   Leuk Esterase: x / RBC: x / WBC x   Sq Epi: x / Non Sq Epi: x / Bacteria: x        MICROBIOLOGY:  Vancomycin Level, Random: 6.9 ug/mL (12-05-23 @ 02:41)  v  .Blood Blood  11-30-23   Growth in anaerobic bottle: Gram Positive Rods  Direct identification is available within approximately 3-5  hours either by Blood Panel Multiplexed PCR or Direct  MALDI-TOF. Details: [email or web address]  --  Blood Culture PCR      .Blood Blood-Venous  11-29-23   No growth at 5 days  --  --      .Blood Blood-Peripheral  11-18-23   No growth at 5 days  --  --      .Blood Blood-Peripheral  11-18-23   No growth at 5 days  --  --      Clean Catch Clean Catch (Midstream)  11-18-23   10,000 - 49,000 CFU/mL Enterococcus faecalis  <10,000 CFU/ml Normal Urogenital kaitlynn present  --  Enterococcus faecalis      .Blood Blood  11-17-23   Growth in aerobic and anaerobic bottles: Enterococcus faecalis  See previous culture [id number]  Growth in aerobic bottle: Staphylococcus epidermidis  --  Staphylococcus epidermidis      .Blood Blood  11-17-23   Growth in aerobic bottle: Enterococcus faecalis  Growth in anaerobic bottle: Staphylococcus epidermidis "Susceptibilities  not performed"  Direct identification is available within approximately 3-5  hours either by Blood Panel Multiplexed PCR or Direct  MALDI-TOF. Details: [email or web address]  --  Blood Culture PCR  Blood Culture PCR  Enterococcus faecalis      .Blood Blood-Peripheral  11-10-23   No growth at 5 days  --  --        CMV IgG Antibody: 4.20 U/mL (11-10-23 @ 17:29)  Toxoplasma IgG Screen: <3.0 IU/mL (11-10-23 @ 17:29)  HIV-1 RNA Quantitative, Viral Load Log: NOT DET. lg /mL (11-10-23 @ 17:06)          RADIOLOGY:    <The imaging below has been reviewed and visualized by me independently. Findings as detailed in report below> Follow Up:  pre-OHT    Interval History: progression of rash. believe it was raised. afebrile.     REVIEW OF SYSTEMS  [  ] ROS unobtainable because:    [x  ] All other systems negative except as noted below    Constitutional:  [ ] fever [ ] chills  [ ] weight loss  [ ] weakness  Skin:  [ x] rash [ ] phlebitis	  Eyes: [ ] icterus [ ] pain  [ ] discharge	  ENMT: [ ] sore throat  [ ] thrush [ ] ulcers [ ] exudates  Respiratory: [ ] dyspnea [ ] hemoptysis [ ] cough [ ] sputum	  Cardiovascular:  [ ] chest pain [ ] palpitations [ ] edema	  Gastrointestinal:  [ ] nausea [ ] vomiting [ ] diarrhea [ ] constipation [ ] pain	  Genitourinary:  [ ] dysuria [ ] frequency [ ] hematuria [ ] discharge [ ] flank pain  [ ] incontinence  Musculoskeletal:  [ ] myalgias [ ] arthralgias [ ] arthritis  [ ] back pain  Neurological:  [ ] headache [ ] seizures  [ ] confusion/altered mental status    Allergies  penicillins (Unknown)        ANTIMICROBIALS:  vancomycin  IVPB 1500 once      OTHER MEDS:  MEDICATIONS  (STANDING):  aMIOdarone    Tablet    aMIOdarone    Tablet 200 daily  aspirin  chewable 81 daily  atorvastatin 80 at bedtime  budesonide  80 MICROgram(s)/formoterol 4.5 MICROgram(s) Inhaler 2 two times a day  carvedilol 25 every 12 hours  heparin  Infusion 1300 <Continuous>  hydrALAZINE 75 three times a day  hydrOXYzine hydrochloride 25 two times a day PRN  insulin glargine Injectable (LANTUS) 12 at bedtime  insulin lispro (ADMELOG) corrective regimen sliding scale  at bedtime  insulin lispro (ADMELOG) corrective regimen sliding scale  three times a day before meals  insulin lispro Injectable (ADMELOG) 9 three times a day before meals  isosorbide   dinitrate Tablet (ISORDIL) 30 three times a day  polyethylene glycol 3350 17 two times a day  senna 2 at bedtime      Vital Signs Last 24 Hrs  T(C): 36.9 (05 Dec 2023 11:00), Max: 37.1 (04 Dec 2023 23:00)  T(F): 98.5 (05 Dec 2023 11:00), Max: 98.7 (04 Dec 2023 23:00)  HR: 86 (05 Dec 2023 14:00) (74 - 86)  BP: --  BP(mean): --  RR: 22 (05 Dec 2023 14:00) (15 - 24)  SpO2: 99% (05 Dec 2023 14:00) (97% - 100%)    Parameters below as of 05 Dec 2023 14:00  Patient On (Oxygen Delivery Method): room air    PHYSICAL EXAMINATION:  General: Alert and Awake, NAD  Cardiac: RRR, No M/R/G  Resp: CTAB, No Wh/Rh/Ra  Abdomen: NBS, NT/ND, No HSM, No rigidity or guarding  MSK: No LE edema. No Calf tenderness  Vasc: R Balloon Pump (No surrounding erythema, drainage or tenderness to palpation)  Skin: Maculopapular rash on UE, UE and to lesser degree the chest. Skin is warm and dry to the touch.   Neuro: Alert and Awake. CN 2-12 Grossly intact. Moves all four extremities spontaneously.  Psych: Calm, Pleasant, Cooperative                          12.0   7.66  )-----------( 132      ( 05 Dec 2023 02:41 )             35.9       12-05    132<L>  |  102  |  26<H>  ----------------------------<  130<H>  4.4   |  19<L>  |  1.30    Ca    9.4      05 Dec 2023 02:41  Phos  3.3     12-05  Mg     2.0     12-05    TPro  6.6  /  Alb  3.7  /  TBili  0.3  /  DBili  x   /  AST  36  /  ALT  101<H>  /  AlkPhos  60  12-05      Urinalysis Basic - ( 05 Dec 2023 02:41 )    Color: x / Appearance: x / SG: x / pH: x  Gluc: 130 mg/dL / Ketone: x  / Bili: x / Urobili: x   Blood: x / Protein: x / Nitrite: x   Leuk Esterase: x / RBC: x / WBC x   Sq Epi: x / Non Sq Epi: x / Bacteria: x        MICROBIOLOGY:  Vancomycin Level, Random: 6.9 ug/mL (12-05-23 @ 02:41)  v  .Blood Blood  11-30-23   Growth in anaerobic bottle: Gram Positive Rods  Direct identification is available within approximately 3-5  hours either by Blood Panel Multiplexed PCR or Direct  MALDI-TOF. Details: [email or web address]  --  Blood Culture PCR      .Blood Blood-Venous  11-29-23   No growth at 5 days  --  --      .Blood Blood-Peripheral  11-18-23   No growth at 5 days  --  --      .Blood Blood-Peripheral  11-18-23   No growth at 5 days  --  --      Clean Catch Clean Catch (Midstream)  11-18-23   10,000 - 49,000 CFU/mL Enterococcus faecalis  <10,000 CFU/ml Normal Urogenital kaitlynn present  --  Enterococcus faecalis      .Blood Blood  11-17-23   Growth in aerobic and anaerobic bottles: Enterococcus faecalis  See previous culture [id number]  Growth in aerobic bottle: Staphylococcus epidermidis  --  Staphylococcus epidermidis      .Blood Blood  11-17-23   Growth in aerobic bottle: Enterococcus faecalis  Growth in anaerobic bottle: Staphylococcus epidermidis "Susceptibilities  not performed"  Direct identification is available within approximately 3-5  hours either by Blood Panel Multiplexed PCR or Direct  MALDI-TOF. Details: [email or web address]  --  Blood Culture PCR  Blood Culture PCR  Enterococcus faecalis      .Blood Blood-Peripheral  11-10-23   No growth at 5 days  --  --        CMV IgG Antibody: 4.20 U/mL (11-10-23 @ 17:29)  Toxoplasma IgG Screen: <3.0 IU/mL (11-10-23 @ 17:29)  HIV-1 RNA Quantitative, Viral Load Log: NOT DET. lg /mL (11-10-23 @ 17:06)          RADIOLOGY:    <The imaging below has been reviewed and visualized by me independently. Findings as detailed in report below>    < from: Xray Chest 1 View- PORTABLE-Routine (Xray Chest 1 View- PORTABLE-Routine in AM.) (12.05.23 @ 04:18) >  Impression:    No acute pulmonary disease.    IABP is approximately 2.8 cm below the aortic knob.    < end of copied text >   Follow Up:  pre-OHT    Interval History: progression of rash. believe it was raised. afebrile.     REVIEW OF SYSTEMS  [  ] ROS unobtainable because:    [x  ] All other systems negative except as noted below    Constitutional:  [ ] fever [ ] chills  [ ] weight loss  [ ] weakness  Skin:  [ x] rash [ ] phlebitis	  Eyes: [ ] icterus [ ] pain  [ ] discharge	  ENMT: [ ] sore throat  [ ] thrush [ ] ulcers [ ] exudates  Respiratory: [ ] dyspnea [ ] hemoptysis [ ] cough [ ] sputum	  Cardiovascular:  [ ] chest pain [ ] palpitations [ ] edema	  Gastrointestinal:  [ ] nausea [ ] vomiting [ ] diarrhea [ ] constipation [ ] pain	  Genitourinary:  [ ] dysuria [ ] frequency [ ] hematuria [ ] discharge [ ] flank pain  [ ] incontinence  Musculoskeletal:  [ ] myalgias [ ] arthralgias [ ] arthritis  [ ] back pain  Neurological:  [ ] headache [ ] seizures  [ ] confusion/altered mental status    Allergies  penicillins (Unknown)        ANTIMICROBIALS:  vancomycin  IVPB 1500 once      OTHER MEDS:  MEDICATIONS  (STANDING):  aMIOdarone    Tablet    aMIOdarone    Tablet 200 daily  aspirin  chewable 81 daily  atorvastatin 80 at bedtime  budesonide  80 MICROgram(s)/formoterol 4.5 MICROgram(s) Inhaler 2 two times a day  carvedilol 25 every 12 hours  heparin  Infusion 1300 <Continuous>  hydrALAZINE 75 three times a day  hydrOXYzine hydrochloride 25 two times a day PRN  insulin glargine Injectable (LANTUS) 12 at bedtime  insulin lispro (ADMELOG) corrective regimen sliding scale  at bedtime  insulin lispro (ADMELOG) corrective regimen sliding scale  three times a day before meals  insulin lispro Injectable (ADMELOG) 9 three times a day before meals  isosorbide   dinitrate Tablet (ISORDIL) 30 three times a day  polyethylene glycol 3350 17 two times a day  senna 2 at bedtime      Vital Signs Last 24 Hrs  T(C): 36.9 (05 Dec 2023 11:00), Max: 37.1 (04 Dec 2023 23:00)  T(F): 98.5 (05 Dec 2023 11:00), Max: 98.7 (04 Dec 2023 23:00)  HR: 86 (05 Dec 2023 14:00) (74 - 86)  BP: --  BP(mean): --  RR: 22 (05 Dec 2023 14:00) (15 - 24)  SpO2: 99% (05 Dec 2023 14:00) (97% - 100%)    Parameters below as of 05 Dec 2023 14:00  Patient On (Oxygen Delivery Method): room air    PHYSICAL EXAMINATION:  General: Alert and Awake, NAD  Cardiac: RRR, No M/R/G  Resp: CTAB, No Wh/Rh/Ra  Abdomen: NBS, NT/ND, No HSM, No rigidity or guarding  MSK: No LE edema. No Calf tenderness  Vasc: R Balloon Pump (No surrounding erythema, drainage or tenderness to palpation)  Skin: Maculopapular rash on UE, UE and to lesser degree the chest. Skin is warm and dry to the touch.   Neuro: Alert and Awake. CN 2-12 Grossly intact. Moves all four extremities spontaneously.  Psych: Calm, Pleasant, Cooperative                          12.0   7.66  )-----------( 132      ( 05 Dec 2023 02:41 )             35.9       12-05    132<L>  |  102  |  26<H>  ----------------------------<  130<H>  4.4   |  19<L>  |  1.30    Ca    9.4      05 Dec 2023 02:41  Phos  3.3     12-05  Mg     2.0     12-05    TPro  6.6  /  Alb  3.7  /  TBili  0.3  /  DBili  x   /  AST  36  /  ALT  101<H>  /  AlkPhos  60  12-05      Urinalysis Basic - ( 05 Dec 2023 02:41 )    Color: x / Appearance: x / SG: x / pH: x  Gluc: 130 mg/dL / Ketone: x  / Bili: x / Urobili: x   Blood: x / Protein: x / Nitrite: x   Leuk Esterase: x / RBC: x / WBC x   Sq Epi: x / Non Sq Epi: x / Bacteria: x        MICROBIOLOGY:  Vancomycin Level, Random: 6.9 ug/mL (12-05-23 @ 02:41)  v  .Blood Blood  11-30-23   Growth in anaerobic bottle: Gram Positive Rods  Direct identification is available within approximately 3-5  hours either by Blood Panel Multiplexed PCR or Direct  MALDI-TOF. Details: [email or web address]  --  Blood Culture PCR      .Blood Blood-Venous  11-29-23   No growth at 5 days  --  --      .Blood Blood-Peripheral  11-18-23   No growth at 5 days  --  --      .Blood Blood-Peripheral  11-18-23   No growth at 5 days  --  --      Clean Catch Clean Catch (Midstream)  11-18-23   10,000 - 49,000 CFU/mL Enterococcus faecalis  <10,000 CFU/ml Normal Urogenital kaitlynn present  --  Enterococcus faecalis      .Blood Blood  11-17-23   Growth in aerobic and anaerobic bottles: Enterococcus faecalis  See previous culture [id number]  Growth in aerobic bottle: Staphylococcus epidermidis  --  Staphylococcus epidermidis      .Blood Blood  11-17-23   Growth in aerobic bottle: Enterococcus faecalis  Growth in anaerobic bottle: Staphylococcus epidermidis "Susceptibilities  not performed"  Direct identification is available within approximately 3-5  hours either by Blood Panel Multiplexed PCR or Direct  MALDI-TOF. Details: [email or web address]  --  Blood Culture PCR  Blood Culture PCR  Enterococcus faecalis      .Blood Blood-Peripheral  11-10-23   No growth at 5 days  --  --        CMV IgG Antibody: 4.20 U/mL (11-10-23 @ 17:29)  Toxoplasma IgG Screen: <3.0 IU/mL (11-10-23 @ 17:29)  HIV-1 RNA Quantitative, Viral Load Log: NOT DET. lg /mL (11-10-23 @ 17:06)          RADIOLOGY:    <The imaging below has been reviewed and visualized by me independently. Findings as detailed in report below>    < from: Xray Chest 1 View- PORTABLE-Routine (Xray Chest 1 View- PORTABLE-Routine in AM.) (12.05.23 @ 04:18) >  Impression:    No acute pulmonary disease.    IABP is approximately 2.8 cm below the aortic knob.    < end of copied text >

## 2023-12-05 NOTE — PROGRESS NOTE ADULT - SUBJECTIVE AND OBJECTIVE BOX
PATIENT:  DEVORAH VALENCIA  [id number]    CHIEF COMPLAINT:  Patient is a 58y old  Male who presents with a chief complaint of cardiogenic shock (04 Dec 2023 07:37)    INTERVAL HISTORY/OVERNIGHT EVENTS:  The patient was seen and examined at bedside.     REVIEW OF SYSTEMS:    all other ROS negative except as per HPI.     MEDICATIONS:  MEDICATIONS  (STANDING):  aMIOdarone    Tablet   Oral   aMIOdarone    Tablet 200 milliGRAM(s) Oral daily  aspirin  chewable 81 milliGRAM(s) Oral daily  atorvastatin 80 milliGRAM(s) Oral at bedtime  budesonide  80 MICROgram(s)/formoterol 4.5 MICROgram(s) Inhaler 2 Puff(s) Inhalation two times a day  carvedilol 25 milliGRAM(s) Oral every 12 hours  chlorhexidine 2% Cloths 1 Application(s) Topical daily  heparin  Infusion 1300 Unit(s)/Hr (14 mL/Hr) IV Continuous <Continuous>  hydrALAZINE 100 milliGRAM(s) Oral three times a day  insulin glargine Injectable (LANTUS) 12 Unit(s) SubCutaneous at bedtime  insulin lispro (ADMELOG) corrective regimen sliding scale   SubCutaneous at bedtime  insulin lispro (ADMELOG) corrective regimen sliding scale   SubCutaneous three times a day before meals  insulin lispro Injectable (ADMELOG) 9 Unit(s) SubCutaneous three times a day before meals  isosorbide   dinitrate Tablet (ISORDIL) 40 milliGRAM(s) Oral three times a day  polyethylene glycol 3350 17 Gram(s) Oral two times a day  senna 2 Tablet(s) Oral at bedtime  vancomycin  IVPB 1500 milliGRAM(s) IV Intermittent once    MEDICATIONS  (PRN):  hydrOXYzine hydrochloride 25 milliGRAM(s) Oral two times a day PRN Anxiety      ALLERGIES:  Allergies  penicillins (Unknown)      OBJECTIVE:  ICU Vital Signs Last 24 Hrs  T(C): 36.7 (05 Dec 2023 03:00), Max: 37.1 (04 Dec 2023 23:00)  T(F): 98 (05 Dec 2023 03:00), Max: 98.7 (04 Dec 2023 23:00)  HR: 80 (05 Dec 2023 06:00) (71 - 84)    RR: 20 (05 Dec 2023 06:00) (15 - 25)  SpO2: 99% (05 Dec 2023 06:00) (97% - 100%)    O2 Parameters below as of 05 Dec 2023 03:00  Patient On (Oxygen Delivery Method): room air    CAPILLARY BLOOD GLUCOSE    POCT Blood Glucose.: 150 mg/dL (04 Dec 2023 20:54)  POCT Blood Glucose.: 187 mg/dL (04 Dec 2023 16:45)  POCT Blood Glucose.: 159 mg/dL (04 Dec 2023 12:15)  POCT Blood Glucose.: 140 mg/dL (04 Dec 2023 08:35)    CAPILLARY BLOOD GLUCOSE      POCT Blood Glucose.: 150 mg/dL (04 Dec 2023 20:54)    I&O's Summary    04 Dec 2023 07:01  -  05 Dec 2023 07:00  --------------------------------------------------------  IN: 1291 mL / OUT: 1450 mL / NET: -159 mL      Daily     Daily Weight in k.2 (05 Dec 2023 06:00)    PHYSICAL EXAMINATION:  General: WN/WD NAD  HEENT: PERRLA, EOMI, moist mucous membranes  Neurology: A&Ox3, nonfocal, MOISE x 4  Respiratory: CTA B/L, normal respiratory effort, no wheezes, crackles, rales  CV: RRR, S1S2, no murmurs, rubs or gallops  Abdominal: Soft, NT, ND +BS, Last BM  Extremities: No edema, + peripheral pulses  Incisions:   Tubes:    LABS:                          12.0   7.66  )-----------( 132      ( 05 Dec 2023 02:41 )             35.9     12-05    132<L>  |  102  |  26<H>  ----------------------------<  130<H>  4.4   |  19<L>  |  1.30    Ca    9.4      05 Dec 2023 02:41  Phos  3.3     12-05  Mg     2.0     12-05    TPro  6.6  /  Alb  3.7  /  TBili  0.3  /  DBili  x   /  AST  36  /  ALT  101<H>  /  AlkPhos  60  12-05    LIVER FUNCTIONS - ( 05 Dec 2023 02:41 )  Alb: 3.7 g/dL / Pro: 6.6 g/dL / ALK PHOS: 60 U/L / ALT: 101 U/L / AST: 36 U/L / GGT: x           PT/INR - ( 05 Dec 2023 02:41 )   PT: 11.6 sec;   INR: 1.11 ratio      PTT - ( 05 Dec 2023 02:41 )  PTT:69.2 sec      TELEMETRY:     EKG:     IMAGING:       PATIENT:  DEVORAH VALENCIA  [id number]    CHIEF COMPLAINT:  Patient is a 58y old  Male who presents with a chief complaint of cardiogenic shock (04 Dec 2023 07:37)    INTERVAL HISTORY/OVERNIGHT EVENTS:  The patient was seen and examined at bedside.     REVIEW OF SYSTEMS:    all other ROS negative except as per HPI.     MEDICATIONS:  MEDICATIONS  (STANDING):  aMIOdarone    Tablet   Oral   aMIOdarone    Tablet 200 milliGRAM(s) Oral daily  aspirin  chewable 81 milliGRAM(s) Oral daily  atorvastatin 80 milliGRAM(s) Oral at bedtime  budesonide  80 MICROgram(s)/formoterol 4.5 MICROgram(s) Inhaler 2 Puff(s) Inhalation two times a day  carvedilol 25 milliGRAM(s) Oral every 12 hours  chlorhexidine 2% Cloths 1 Application(s) Topical daily  heparin  Infusion 1300 Unit(s)/Hr (14 mL/Hr) IV Continuous <Continuous>  hydrALAZINE 100 milliGRAM(s) Oral three times a day  insulin glargine Injectable (LANTUS) 12 Unit(s) SubCutaneous at bedtime  insulin lispro (ADMELOG) corrective regimen sliding scale   SubCutaneous at bedtime  insulin lispro (ADMELOG) corrective regimen sliding scale   SubCutaneous three times a day before meals  insulin lispro Injectable (ADMELOG) 9 Unit(s) SubCutaneous three times a day before meals  isosorbide   dinitrate Tablet (ISORDIL) 40 milliGRAM(s) Oral three times a day  polyethylene glycol 3350 17 Gram(s) Oral two times a day  senna 2 Tablet(s) Oral at bedtime  vancomycin  IVPB 1500 milliGRAM(s) IV Intermittent once    MEDICATIONS  (PRN):  hydrOXYzine hydrochloride 25 milliGRAM(s) Oral two times a day PRN Anxiety      ALLERGIES:  Allergies  penicillins (Unknown)      OBJECTIVE:  ICU Vital Signs Last 24 Hrs  T(C): 36.7 (05 Dec 2023 03:00), Max: 37.1 (04 Dec 2023 23:00)  T(F): 98 (05 Dec 2023 03:00), Max: 98.7 (04 Dec 2023 23:00)  HR: 80 (05 Dec 2023 06:00) (71 - 84)    RR: 20 (05 Dec 2023 06:00) (15 - 25)  SpO2: 99% (05 Dec 2023 06:00) (97% - 100%)    O2 Parameters below as of 05 Dec 2023 03:00  Patient On (Oxygen Delivery Method): room air    CAPILLARY BLOOD GLUCOSE    POCT Blood Glucose.: 150 mg/dL (04 Dec 2023 20:54)  POCT Blood Glucose.: 187 mg/dL (04 Dec 2023 16:45)  POCT Blood Glucose.: 159 mg/dL (04 Dec 2023 12:15)  POCT Blood Glucose.: 140 mg/dL (04 Dec 2023 08:35)    CAPILLARY BLOOD GLUCOSE      POCT Blood Glucose.: 150 mg/dL (04 Dec 2023 20:54)    I&O's Summary    04 Dec 2023 07:01  -  05 Dec 2023 07:00  --------------------------------------------------------  IN: 1291 mL / OUT: 1450 mL / NET: -159 mL      Daily     Daily Weight in k.2 (05 Dec 2023 06:00)    PHYSICAL EXAMINATION:  General: WN/WD NAD  HEENT: PERRLA, EOMI, moist mucous membranes  Neurology: A&Ox3, nonfocal, MOISE x 4  Respiratory: CTA B/L, normal respiratory effort, no wheezes, crackles, rales  CV: RRR, S1S2, no murmurs, rubs or gallops  Abdominal: Soft, NT, ND +BS, Last BM  Extremities: No edema, + peripheral pulses  Incisions:   Tubes:    LABS:                          12.0   7.66  )-----------( 132      ( 05 Dec 2023 02:41 )             35.9     12-05    132<L>  |  102  |  26<H>  ----------------------------<  130<H>  4.4   |  19<L>  |  1.30    Ca    9.4      05 Dec 2023 02:41  Phos  3.3     12-05  Mg     2.0     12-05    TPro  6.6  /  Alb  3.7  /  TBili  0.3  /  DBili  x   /  AST  36  /  ALT  101<H>  /  AlkPhos  60  12-05    LIVER FUNCTIONS - ( 05 Dec 2023 02:41 )  Alb: 3.7 g/dL / Pro: 6.6 g/dL / ALK PHOS: 60 U/L / ALT: 101 U/L / AST: 36 U/L / GGT: x           PT/INR - ( 05 Dec 2023 02:41 )   PT: 11.6 sec;   INR: 1.11 ratio      PTT - ( 05 Dec 2023 02:41 )  PTT:69.2 sec      TELEMETRY:     EKG:     IMAGING:       Admission date: 23  Chief complaint/ Diagnosis: CS    HPI: 57yo M w/ hx HTN, CAD w/ 1 stent in , ICH () presenting with abn ekg. Patient presented to [facility] where he was found to have STEMI, recommended to get cath however patient did not want to get it there so left AMA and presented to [facility] for evaluation. Currently awaiting transplant.     INTERVAL HISTORY/OVERNIGHT EVENTS:  The patient was seen and examined at bedside. No acute events overnight. C/o itching to rash on bilateral arms.     REVIEW OF SYSTEMS:    all other ROS negative except as per HPI.     MEDICATIONS:  MEDICATIONS  (STANDING):  aMIOdarone    Tablet   Oral   aMIOdarone    Tablet 200 milliGRAM(s) Oral daily  aspirin  chewable 81 milliGRAM(s) Oral daily  atorvastatin 80 milliGRAM(s) Oral at bedtime  budesonide  80 MICROgram(s)/formoterol 4.5 MICROgram(s) Inhaler 2 Puff(s) Inhalation two times a day  carvedilol 25 milliGRAM(s) Oral every 12 hours  chlorhexidine 2% Cloths 1 Application(s) Topical daily  heparin  Infusion 1300 Unit(s)/Hr (14 mL/Hr) IV Continuous <Continuous>  hydrALAZINE 100 milliGRAM(s) Oral three times a day  insulin glargine Injectable (LANTUS) 12 Unit(s) SubCutaneous at bedtime  insulin lispro (ADMELOG) corrective regimen sliding scale   SubCutaneous at bedtime  insulin lispro (ADMELOG) corrective regimen sliding scale   SubCutaneous three times a day before meals  insulin lispro Injectable (ADMELOG) 9 Unit(s) SubCutaneous three times a day before meals  isosorbide   dinitrate Tablet (ISORDIL) 40 milliGRAM(s) Oral three times a day  polyethylene glycol 3350 17 Gram(s) Oral two times a day  senna 2 Tablet(s) Oral at bedtime  vancomycin  IVPB 1500 milliGRAM(s) IV Intermittent once    MEDICATIONS  (PRN):  hydrOXYzine hydrochloride 25 milliGRAM(s) Oral two times a day PRN Anxiety      ALLERGIES:  Allergies  penicillins (Unknown)      OBJECTIVE:  ICU Vital Signs Last 24 Hrs  T(C): 36.7 (05 Dec 2023 03:00), Max: 37.1 (04 Dec 2023 23:00)  T(F): 98 (05 Dec 2023 03:00), Max: 98.7 (04 Dec 2023 23:00)  HR: 80 (05 Dec 2023 06:00) (71 - 84)    RR: 20 (05 Dec 2023 06:00) (15 - 25)  SpO2: 99% (05 Dec 2023 06:00) (97% - 100%)    O2 Parameters below as of 05 Dec 2023 03:00  Patient On (Oxygen Delivery Method): room air    CAPILLARY BLOOD GLUCOSE    POCT Blood Glucose.: 150 mg/dL (04 Dec 2023 20:54)  POCT Blood Glucose.: 187 mg/dL (04 Dec 2023 16:45)  POCT Blood Glucose.: 159 mg/dL (04 Dec 2023 12:15)  POCT Blood Glucose.: 140 mg/dL (04 Dec 2023 08:35)      I&O's Summary    04 Dec 2023 07:01  -  05 Dec 2023 07:00  --------------------------------------------------------  IN: 1291 mL / OUT: 1450 mL / NET: -159 mL      Daily     Daily Weight in k.2 (05 Dec 2023 06:00)    PHYSICAL EXAMINATION:  General: WN/WD NAD  HEENT: PERRLA, EOMI, moist mucous membranes  Neurology: A&Ox3, nonfocal, MOISE x 4  Respiratory: CTA B/L, normal respiratory effort, no wheezes, crackles, rales  CV: RRR, S1S2, no murmurs, rubs or gallops  Abdominal: Soft, NT, ND +BS, Last BM  Extremities: No edema, + peripheral pulses  Skin: urticarial rash to bilateral upper extremities      LABS:                          12.0   7.66  )-----------( 132      ( 05 Dec 2023 02:41 )             35.9     12    132<L>  |  102  |  26<H>  ----------------------------<  130<H>  4.4   |  19<L>  |  1.30    Ca    9.4      05 Dec 2023 02:41  Phos  3.3     12-  Mg     2.0     12-05    TPro  6.6  /  Alb  3.7  /  TBili  0.3  /  DBili  x   /  AST  36  /  ALT  101<H>  /  AlkPhos  60      LIVER FUNCTIONS - ( 05 Dec 2023 02:41 )  Alb: 3.7 g/dL / Pro: 6.6 g/dL / ALK PHOS: 60 U/L / ALT: 101 U/L / AST: 36 U/L / GGT: x           PT/INR - ( 05 Dec 2023 02:41 )   PT: 11.6 sec;   INR: 1.11 ratio      PTT - ( 05 Dec 2023 02:41 )  PTT:69.2 sec     Admission date: 23  Chief complaint/ Diagnosis: CS    HPI: 59yo M w/ hx HTN, CAD w/ 1 stent in , ICH () presenting with abn ekg. Patient presented to [facility] where he was found to have STEMI, recommended to get cath however patient did not want to get it there so left AMA and presented to [facility] for evaluation. Currently awaiting transplant.     INTERVAL HISTORY/OVERNIGHT EVENTS:  The patient was seen and examined at bedside. No acute events overnight. C/o itching to rash on bilateral arms.     REVIEW OF SYSTEMS:    all other ROS negative except as per HPI.     MEDICATIONS:  MEDICATIONS  (STANDING):  aMIOdarone    Tablet   Oral   aMIOdarone    Tablet 200 milliGRAM(s) Oral daily  aspirin  chewable 81 milliGRAM(s) Oral daily  atorvastatin 80 milliGRAM(s) Oral at bedtime  budesonide  80 MICROgram(s)/formoterol 4.5 MICROgram(s) Inhaler 2 Puff(s) Inhalation two times a day  carvedilol 25 milliGRAM(s) Oral every 12 hours  chlorhexidine 2% Cloths 1 Application(s) Topical daily  heparin  Infusion 1300 Unit(s)/Hr (14 mL/Hr) IV Continuous <Continuous>  hydrALAZINE 100 milliGRAM(s) Oral three times a day  insulin glargine Injectable (LANTUS) 12 Unit(s) SubCutaneous at bedtime  insulin lispro (ADMELOG) corrective regimen sliding scale   SubCutaneous at bedtime  insulin lispro (ADMELOG) corrective regimen sliding scale   SubCutaneous three times a day before meals  insulin lispro Injectable (ADMELOG) 9 Unit(s) SubCutaneous three times a day before meals  isosorbide   dinitrate Tablet (ISORDIL) 40 milliGRAM(s) Oral three times a day  polyethylene glycol 3350 17 Gram(s) Oral two times a day  senna 2 Tablet(s) Oral at bedtime  vancomycin  IVPB 1500 milliGRAM(s) IV Intermittent once    MEDICATIONS  (PRN):  hydrOXYzine hydrochloride 25 milliGRAM(s) Oral two times a day PRN Anxiety      ALLERGIES:  Allergies  penicillins (Unknown)      OBJECTIVE:  ICU Vital Signs Last 24 Hrs  T(C): 36.7 (05 Dec 2023 03:00), Max: 37.1 (04 Dec 2023 23:00)  T(F): 98 (05 Dec 2023 03:00), Max: 98.7 (04 Dec 2023 23:00)  HR: 80 (05 Dec 2023 06:00) (71 - 84)    RR: 20 (05 Dec 2023 06:00) (15 - 25)  SpO2: 99% (05 Dec 2023 06:00) (97% - 100%)    O2 Parameters below as of 05 Dec 2023 03:00  Patient On (Oxygen Delivery Method): room air    CAPILLARY BLOOD GLUCOSE    POCT Blood Glucose.: 150 mg/dL (04 Dec 2023 20:54)  POCT Blood Glucose.: 187 mg/dL (04 Dec 2023 16:45)  POCT Blood Glucose.: 159 mg/dL (04 Dec 2023 12:15)  POCT Blood Glucose.: 140 mg/dL (04 Dec 2023 08:35)      I&O's Summary    04 Dec 2023 07:01  -  05 Dec 2023 07:00  --------------------------------------------------------  IN: 1291 mL / OUT: 1450 mL / NET: -159 mL      Daily     Daily Weight in k.2 (05 Dec 2023 06:00)    PHYSICAL EXAMINATION:  General: WN/WD NAD  HEENT: PERRLA, EOMI, moist mucous membranes  Neurology: A&Ox3, nonfocal, MOISE x 4  Respiratory: CTA B/L, normal respiratory effort, no wheezes, crackles, rales  CV: RRR, S1S2, no murmurs, rubs or gallops  Abdominal: Soft, NT, ND +BS, Last BM  Extremities: No edema, + peripheral pulses  Skin: urticarial rash to bilateral upper extremities      LABS:                          12.0   7.66  )-----------( 132      ( 05 Dec 2023 02:41 )             35.9     12    132<L>  |  102  |  26<H>  ----------------------------<  130<H>  4.4   |  19<L>  |  1.30    Ca    9.4      05 Dec 2023 02:41  Phos  3.3     12-  Mg     2.0     12-05    TPro  6.6  /  Alb  3.7  /  TBili  0.3  /  DBili  x   /  AST  36  /  ALT  101<H>  /  AlkPhos  60      LIVER FUNCTIONS - ( 05 Dec 2023 02:41 )  Alb: 3.7 g/dL / Pro: 6.6 g/dL / ALK PHOS: 60 U/L / ALT: 101 U/L / AST: 36 U/L / GGT: x           PT/INR - ( 05 Dec 2023 02:41 )   PT: 11.6 sec;   INR: 1.11 ratio      PTT - ( 05 Dec 2023 02:41 )  PTT:69.2 sec

## 2023-12-05 NOTE — PROGRESS NOTE ADULT - PROBLEM SELECTOR PLAN 2
Hyponatremia: developed during this hospital course. He is not symptomatic. Urine Osm 471 and Urine Na 88. Given 150cc bolus of 3% saline on 11/24. Na today 132 and pt remained asymptomatic. C/w oral intake of food and limit free water <1-1.5L/day.     If you have any questions, please feel free to contact me  Robert Garcia  Nephrology Fellow  [phone]; Prefer [employer] TEAMS  (After 5pm or on weekends please page the on-call fellow). Hyponatremia: developed during this hospital course. He is not symptomatic. Urine Osm 471 and Urine Na 88. Given 150cc bolus of 3% saline on 11/24. Na today 132 and pt remained asymptomatic. C/w oral intake of food and limit free water <1-1.5L/day.     If you have any questions, please feel free to contact me  Robert Garcia  Nephrology Fellow  [phone]; Prefer [employer] TEAMS  (After 5pm or on weekends please page the on-call fellow).

## 2023-12-05 NOTE — PROGRESS NOTE ADULT - ASSESSMENT
58 yrs old male w/ hx of HFrEF (LVIDd 6.4 cm, LVEF 32%), CAD s/p PCI (2008), HTN, DMT2 (A1c 8.3%) and CVA s/p TPA (2018), recently treated for PNA who initially presented to [facility] via EMS after syncope reportedly requiring defibrillation Treated for ACS but left AMA to come to [facility]. Pt has covid and was placed on IABP for hypotension. Now being evaluated for Heart transplant Vs LVAD placement. Nephrology consulted for ARIC.               58 yrs old male w/ hx of HFrEF (LVIDd 6.4 cm, LVEF 32%), CAD s/p PCI (2008), HTN, DMT2 (A1c 8.3%) and CVA s/p TPA (2018), recently treated for PNA who initially presented to [facility] via EMS after syncope reportedly requiring defibrillation Treated for ACS but left AMA to come to [facility]. Pt has covid and was placed on IABP for hypotension. Now being evaluated for Heart transplant Vs LVAD placement. Nephrology consulted for ARIC.

## 2023-12-05 NOTE — PROGRESS NOTE ADULT - PROBLEM SELECTOR PLAN 1
ARIC: in the setting of heart failure, COVID infection. At the time of presentation Scr is 1.25. Started to worsen on 11/4 and peaked at 4.07 11/10 and gradually improved to 1.8 on 11/18. Pt had LHC on 11/1. He has hx of DM with proteinuria of 684 but most recent UA negative. Primary team is planning to get cardiac transplant eval. Remains nonoliguric, UOP 1.7L/24h. HIV, Hep B, Hep C negative. A1c - 8.3. Complements are not low. BETZAIDA and ANCA negative. Light chains - not significant. UPCR <0.1, PLA2R ab - negative, Anti GBM abs - negative. UPEP negative. Duplex - normal, symmetric blood flow throughout both kidneys. Velocities and RIs are limited by IABP.     Serum creatinine improved to 1.3 today. Pts Hemodynamics are managed by cardio team and pt remains on IABP.    PLAN:  No indication for dialysis.  Avoid nephrotoxic agents. Dose meds per eGFR.

## 2023-12-05 NOTE — PROGRESS NOTE ADULT - ATTENDING COMMENTS
# ARIC/ATN - serum creatinine peaked at 4, and now reaching a plateau of 1.2-1.4mg/dL. We will continue to monitor.     # Hyponatremia - Urine osm 715 and urine Na 21. Serum sodium improved from 129 to 133 gradually through fluid restriction and hypertonic saline (11/24).       The rest of the recommendations as per fellow's note.    Suzanne Jackson MD  Attending Nephrologist  [phone] or via [email or web address] . # ARIC/ATN - serum creatinine peaked at 4, and now reaching a plateau of 1.2-1.4mg/dL. We will continue to monitor.     # Hyponatremia - Urine osm 715 and urine Na 21. Serum sodium improved from 129 to 133 gradually through fluid restriction and hypertonic saline (11/24).       The rest of the recommendations as per fellow's note.    Suzanne Jackson MD  Attending Nephrologist  [phone] or via [employer] .

## 2023-12-05 NOTE — CONSULT NOTE ADULT - SUBJECTIVE AND OBJECTIVE BOX
HPI: 58 male with HTN, CAD (s/p PCI 2008), HFrEF, CVA 2018, and T2DM presenting with chest pressure and unknown tachycardia that was shocked x1, [facility] 11/1 found to have in-stent restenosis of pLAD and  of RCA with elevated RA and PA pressures and severely decreased. Admitted to CICU for management of cardiogenic shock and ADHF requiring IABP 11/1 -11/7, with hospital course c/b vfib arrest requiring reinsertion of IABP. Currently listed for transplant status 7 due to positive blood culture drawn on 11/30.    Dermatology consulted for rash that started on b/l arms 11/24 and has since spread. Somewhat itchy.     PAST MEDICAL & SURGICAL HISTORY:  HTN (hypertension)  CAD (coronary artery disease)  2009; stent  Intracranial hemorrhage  2008  Respiratory arrest  december 1st  Myocardial infarction, unspecified MI type, unspecified artery  History of coronary artery stent placement    Review of Systems: ____________________________________  REVIEW OF SYSTEMS  General: no fevers/chills	  Skin/Breast: see HPI  Ophthalmologic: no eye pain or change in vision  ENMT: no dysphagia or change in hearing  Respiratory and Thorax: no cough  Cardiovascular: + initial chest pain as above  Gastrointestinal: no abdomenal pain or blood in stool   Genitourinary: no dysuria or frequency  Musculoskeletal: no joint pains	  Neurological:no focal neuro deficits    MEDICATIONS  (STANDING):  aspirin  chewable 81 milliGRAM(s) Oral daily  atorvastatin 80 milliGRAM(s) Oral at bedtime  budesonide  80 MICROgram(s)/formoterol 4.5 MICROgram(s) Inhaler 2 Puff(s) Inhalation two times a day  carvedilol 25 milliGRAM(s) Oral every 12 hours  chlorhexidine 2% Cloths 1 Application(s) Topical daily  heparin  Infusion 1300 Unit(s)/Hr IV Continuous <Continuous>  hydrALAZINE 75 milliGRAM(s) Oral three times a day  insulin glargine Injectable (LANTUS) 12 Unit(s) SubCutaneous at bedtime  insulin lispro (ADMELOG) corrective regimen sliding scale   SubCutaneous at bedtime  insulin lispro (ADMELOG) corrective regimen sliding scale   SubCutaneous three times a day before meals  insulin lispro Injectable (ADMELOG) 9 Unit(s) SubCutaneous three times a day before meals  isosorbide   dinitrate Tablet (ISORDIL) 30 milliGRAM(s) Oral three times a day  polyethylene glycol 3350 17 Gram(s) Oral two times a day  senna 2 Tablet(s) Oral at bedtime    ALLERGIES: penicillins        SOCIAL HISTORY:  ____________________________________  Social History:    FAMILY HISTORY: ____________________________________  FAMILY HISTORY:  Family history of meningitis (Sibling)  Brother, death at age 49 from complications of infection (June 2015)    Family history of hypertension in mother  Mother          VITAL SIGNS LAST 24 HOURS:  T(F): 98.5 (12-05 @ 23:00), Max: 98.5 (12-05 @ 07:00)  HR: 81 (12-06 @ 00:00) (75 - 89)  BP: --  RR: 23 (12-06 @ 00:00) (15 - 25)    ___________________________________  PHYSICAL EXAM:     The patient was alert and oriented X 3, laying in bed  OP showed no ulcerations  There was no visible lymphadenopathy.  Conjunctiva were non injected  There was no clubbing or edema of extremities.  The scalp, hair, face, eyebrows, lips, OP, neck, chest,   extremities X 4, nails were examined. Back not examined as patient is unable to rise above 30degree incline given heart condition  There was no hyperhidrosis or bromhidrosis.    Of note on skin exam:   pink macules and papules coalescing into patches and plaques on b/l arms, neck, chest, and legs  ____________________________________    LABS:                        12.0   7.66  )-----------( 132      ( 05 Dec 2023 02:41 )             35.9     12-05    132<L>  |  102  |  26<H>  ----------------------------<  130<H>  4.4   |  19<L>  |  1.30    Ca    9.4      05 Dec 2023 02:41  Phos  3.3     12-05  Mg     2.0     12-05    TPro  6.6  /  Alb  3.7  /  TBili  0.3  /  DBili  x   /  AST  36  /  ALT  101<H>  /  AlkPhos  60  12-05    PT/INR - ( 05 Dec 2023 02:41 )   PT: 11.6 sec;   INR: 1.11 ratio         PTT - ( 05 Dec 2023 02:41 )  PTT:69.2 sec  Urinalysis Basic - ( 05 Dec 2023 02:41 )    Color: x / Appearance: x / SG: x / pH: x  Gluc: 130 mg/dL / Ketone: x  / Bili: x / Urobili: x   Blood: x / Protein: x / Nitrite: x   Leuk Esterase: x / RBC: x / WBC x   Sq Epi: x / Non Sq Epi: x / Bacteria: x     HPI: 58 male with HTN, CAD (s/p PCI 2008), HFrEF, CVA 2018, and T2DM presenting with chest pressure and unknown tachycardia that was shocked x1, [facility] 11/1 found to have in-stent restenosis of pLAD and  of RCA with elevated RA and PA pressures and severely decreased. Admitted to CICU for management of cardiogenic shock and ADHF requiring IABP 11/1 -11/7, with hospital course c/b vfib arrest requiring reinsertion of IABP. Currently listed for transplant status 7 due to positive blood culture drawn on 11/30.    Dermatology consulted for rash that started on b/l arms 11/24 and has since spread. Somewhat itchy.     PAST MEDICAL & SURGICAL HISTORY:  HTN (hypertension)  CAD (coronary artery disease)  2009; stent  Intracranial hemorrhage  2008  Respiratory arrest  december 1st  Myocardial infarction, unspecified MI type, unspecified artery  History of coronary artery stent placement    Review of Systems: ____________________________________  REVIEW OF SYSTEMS  General: no fevers/chills	  Skin/Breast: see HPI  Ophthalmologic: no eye pain or change in vision  ENMT: no dysphagia or change in hearing  Respiratory and Thorax: no cough  Cardiovascular: + initial chest pain as above  Gastrointestinal: no abdomenal pain or blood in stool   Genitourinary: no dysuria or frequency  Musculoskeletal: no joint pains	  Neurological:no focal neuro deficits    MEDICATIONS  (STANDING):  aspirin  chewable 81 milliGRAM(s) Oral daily  atorvastatin 80 milliGRAM(s) Oral at bedtime  budesonide  80 MICROgram(s)/formoterol 4.5 MICROgram(s) Inhaler 2 Puff(s) Inhalation two times a day  carvedilol 25 milliGRAM(s) Oral every 12 hours  chlorhexidine 2% Cloths 1 Application(s) Topical daily  heparin  Infusion 1300 Unit(s)/Hr IV Continuous <Continuous>  hydrALAZINE 75 milliGRAM(s) Oral three times a day  insulin glargine Injectable (LANTUS) 12 Unit(s) SubCutaneous at bedtime  insulin lispro (ADMELOG) corrective regimen sliding scale   SubCutaneous at bedtime  insulin lispro (ADMELOG) corrective regimen sliding scale   SubCutaneous three times a day before meals  insulin lispro Injectable (ADMELOG) 9 Unit(s) SubCutaneous three times a day before meals  isosorbide   dinitrate Tablet (ISORDIL) 30 milliGRAM(s) Oral three times a day  polyethylene glycol 3350 17 Gram(s) Oral two times a day  senna 2 Tablet(s) Oral at bedtime    ALLERGIES: penicillins        SOCIAL HISTORY:  ____________________________________  Social History:    FAMILY HISTORY: ____________________________________  FAMILY HISTORY:  Family history of meningitis (Sibling)  Brother, death at age 49 from complications of infection (June 2015)    Family history of hypertension in mother  Mother          VITAL SIGNS LAST 24 HOURS:  T(F): 98.5 (12-05 @ 23:00), Max: 98.5 (12-05 @ 07:00)  HR: 81 (12-06 @ 00:00) (75 - 89)  BP: --  RR: 23 (12-06 @ 00:00) (15 - 25)    ___________________________________  PHYSICAL EXAM:     The patient was alert and oriented X 3, laying in bed  OP showed no ulcerations  There was no visible lymphadenopathy.  Conjunctiva were non injected  There was no clubbing or edema of extremities.  The scalp, hair, face, eyebrows, lips, OP, neck, chest,   extremities X 4, nails were examined. Back not examined as patient is unable to rise above 30degree incline given heart condition  There was no hyperhidrosis or bromhidrosis.    Of note on skin exam:   pink macules and papules coalescing into patches and plaques on b/l arms, neck, chest, and legs  ____________________________________    LABS:                        12.0   7.66  )-----------( 132      ( 05 Dec 2023 02:41 )             35.9     12-05    132<L>  |  102  |  26<H>  ----------------------------<  130<H>  4.4   |  19<L>  |  1.30    Ca    9.4      05 Dec 2023 02:41  Phos  3.3     12-05  Mg     2.0     12-05    TPro  6.6  /  Alb  3.7  /  TBili  0.3  /  DBili  x   /  AST  36  /  ALT  101<H>  /  AlkPhos  60  12-05    PT/INR - ( 05 Dec 2023 02:41 )   PT: 11.6 sec;   INR: 1.11 ratio         PTT - ( 05 Dec 2023 02:41 )  PTT:69.2 sec  Urinalysis Basic - ( 05 Dec 2023 02:41 )    Color: x / Appearance: x / SG: x / pH: x  Gluc: 130 mg/dL / Ketone: x  / Bili: x / Urobili: x   Blood: x / Protein: x / Nitrite: x   Leuk Esterase: x / RBC: x / WBC x   Sq Epi: x / Non Sq Epi: x / Bacteria: x

## 2023-12-05 NOTE — PROGRESS NOTE ADULT - ASSESSMENT
59 yo M with HFrEF (LVIDd 6.4 cm, LVEF 32%), CAD s/p PCI (2008), HTN, DMT2 (A1c 8.3%) and CVA s/p TPA (2018), recently treated for PNA who initially presented to [facility] via EMS after syncope reportedly requiring defibrilation. Treated for ACS but left AMA to come to [facility]. On arrival ECG with ST depression in lateral leads. Intubated 11/1 for respiratory failure. Found to have COVID. L/RHC 11/1revealing  of LAD and RCA, elevated filling pressures and CI of 1.5 prompting placement of IABP. Extubated 11/3. IABP was weaned to off 11/7, then the following day on 11/8, developed PMVT cardiac arrest with prompt CPR and defibrillation, started on IV Lidocaine and Amio. IABP ultimately replaced on 11/9 for worsening hypotension. TTE 11/11 revealing LV thrombus.     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 AT evaluation launched 11/10, he is ABO A. He was discussed during TSC on 11/16 and was approved for listing, however was found to be Bacteremic with 11/17 Blc Cx growing mixed cultures Staph epi and Enterococcus faecalis and started on IV Vanco. Repeat cultures from 11/18 reveal NGTD and therefore was cleared by ID for listing.     He appears adequately supported on IABP at 1:1, electrically quiescent on oral Amiodarone. Cr is improving with holding diuretics. Labs otherwise notable for worsening thrombocytopenia. Awaiting suitable donor. Now with GM + rods in blood culture on 11/30 (reported on 12/4), restarted on vancomycin, and status 7, pending repeat cultures.       Cardiac Studies  11/21/23 TTE: limited unable to rule out endocarditis, technically difficult study  11/1123 TTE: LVEF 22% (global), LV thrombus, normal RV size and function, mild MR, trace TR  11/1/23  LHC showed pLAD 70 % stenosis in the ostium portion of the segment and 100 % in-stent restenosis and in mRCA, 100 % stenosis.   11/1/23 RHC; RA 23 Vw 24, PA 52/33/40, PCWP 30 Vw 33, AO 85/66/73, PA sat 54.4%, CO/CI (F) 2.96/1.44, IABP was placed during the cath through R common femoral artery.   11/1/23 TTE: LVIDd 6.4cm , LVEF 32%, regional WMA, grade II DD,  TAPSE 1.7cm, mld MR, trace TR,     Bedside hemodynamics  11/25 IABP 1:1: CVP 3, PA  33-36/12 mean PA 20-23 PCWP 15-16, MVO2 72.0%, CO/CI 6.2/2.8,   dsc? (PVR 0.8-1.1 [facility] calculated by me)   11/3: IABP 1:1 RA 5; PA 27/12/18; CO/CI 5.6/2.6; MAP 90-100s.  11/4/23 IABP 1:3 CVP 4 PA 37/18 SvO2 83.8 CO/CI 11.2 CI 5.1  (in the setting of fever to 38.3C)  11/5 IABP 1:1 CVP 3 PA 48/27 SvO2 78.4% CO 8.2 CI 3.7   11/1 (IABP 1:1): CVP 1, PA 33/5/16, MvO2 74.3% 59 yo M with HFrEF (LVIDd 6.4 cm, LVEF 32%), CAD s/p PCI (2008), HTN, DMT2 (A1c 8.3%) and CVA s/p TPA (2018), recently treated for PNA who initially presented to [facility] via EMS after syncope reportedly requiring defibrilation. Treated for ACS but left AMA to come to [facility]. On arrival ECG with ST depression in lateral leads. Intubated 11/1 for respiratory failure. Found to have COVID. L/RHC 11/1revealing  of LAD and RCA, elevated filling pressures and CI of 1.5 prompting placement of IABP. Extubated 11/3. IABP was weaned to off 11/7, then the following day on 11/8, developed PMVT cardiac arrest with prompt CPR and defibrillation, started on IV Lidocaine and Amio. IABP ultimately replaced on 11/9 for worsening hypotension. TTE 11/11 revealing LV thrombus.     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 AT evaluation launched 11/10, he is ABO A. He was discussed during TSC on 11/16 and was approved for listing, however was found to be Bacteremic with 11/17 Blc Cx growing mixed cultures Staph epi and Enterococcus faecalis and started on IV Vanco. Repeat cultures from 11/18 reveal NGTD and therefore was cleared by ID for listing.     He appears adequately supported on IABP at 1:1, electrically quiescent on oral Amiodarone. Cr is improving with holding diuretics. Labs otherwise notable for worsening thrombocytopenia. Awaiting suitable donor. Now with GM + rods in blood culture on 11/30 (reported on 12/4), restarted on vancomycin, and status 7, pending repeat cultures.       Cardiac Studies  11/21/23 TTE: limited unable to rule out endocarditis, technically difficult study  11/1123 TTE: LVEF 22% (global), LV thrombus, normal RV size and function, mild MR, trace TR  11/1/23  LHC showed pLAD 70 % stenosis in the ostium portion of the segment and 100 % in-stent restenosis and in mRCA, 100 % stenosis.   11/1/23 RHC; RA 23 Vw 24, PA 52/33/40, PCWP 30 Vw 33, AO 85/66/73, PA sat 54.4%, CO/CI (F) 2.96/1.44, IABP was placed during the cath through R common femoral artery.   11/1/23 TTE: LVIDd 6.4cm , LVEF 32%, regional WMA, grade II DD,  TAPSE 1.7cm, mld MR, trace TR,     Bedside hemodynamics  11/25 IABP 1:1: CVP 3, PA  33-36/12 mean PA 20-23 PCWP 15-16, MVO2 72.0%, CO/CI 6.2/2.8,   dsc? (PVR 0.8-1.1 [facility] calculated by me)   11/3: IABP 1:1 RA 5; PA 27/12/18; CO/CI 5.6/2.6; MAP 90-100s.  11/4/23 IABP 1:3 CVP 4 PA 37/18 SvO2 83.8 CO/CI 11.2 CI 5.1  (in the setting of fever to 38.3C)  11/5 IABP 1:1 CVP 3 PA 48/27 SvO2 78.4% CO 8.2 CI 3.7   11/1 (IABP 1:1): CVP 1, PA 33/5/16, MvO2 74.3%

## 2023-12-05 NOTE — PROGRESS NOTE ADULT - ATTENDING COMMENTS
History of anxiety disorder and CAD s/p PCI  Admitted with cardiogenic shock and respiratory failure in the setting of stent restenosis  Transferred out and had VT/VF cardiac arrest  Readmitted to CICU  Status 7 for heart transplant given +bcx  A+Ox3, anxious - continue Atarax prn  Cardiogenic shock requiring IABP, also on Bidil for afterload reduction; sometimes Bidil doses are held due to hypotension   Maintain IABP, decrease Isordil  S/p VT/VF arrest on Amiodarone PO and Coreg  SpO2 mid 90s on room air  Soft diet  Likely CKD post arrest, compensated on exam, hyponatremic, not overloaded on exam  Continue to hold diuretics  H/H low but acceptable on Heparin drip for LV thrombus and IABP  Afebrile, bcx 1 bottle +GPR, awaiting speciation, on Vancomycin; ID is following  Sugars controlled on Lantus/Admelog  Consult Dermatology for rash on b/l UEs  IABP 11/20.

## 2023-12-05 NOTE — PROGRESS NOTE ADULT - PROBLEM SELECTOR PLAN 4
- Cr on admission 1.2, peaked to 4--> 1.06-> 1.3   - Support as above.  - Improving  - Appreciate CardioRenal input.

## 2023-12-05 NOTE — PROGRESS NOTE ADULT - ASSESSMENT
57 yo M with PMHx of HTN, CAD w/ 1 stent in 2009, ICH (2008) presented on 11/1 with abn ekg. Patient presented to [facility] where he was found to have STEMI, recommended to get cath however patient did not want to get it there so he left and came here.   EKG here with ST depression in lateral leads and elevation in anterior leads   Prior to [facility] found to be tachycardic, dyspneic, intubated   C 11/1 with chronic total occlusion of LAD and RCA, with elevated RA and PA pressures  TTE 11/1 with severely decreased EF 32%, s/p IABP 11/1    RVP (11/1) Positive for COVID19  RVP (11/10) Negative  BCx (11/2, 11/4) NGTD  ETT Culture (11/2) NGTD  MRSA/MSSA PCR (11/2, 11/10) Negative  UA (11/10) 1 WBC    CXR (11/10) Clear Lungs  TTE (11/2) Left ventricular thrombus.    #Positive Blood Cultures (11/30 - [facility])  Unclear if contaminant or true pathogen  Growth this far from procurement raises question of contaminant  However, did have leukocytosis on the day prior  --Continue Vancomycin. If higher concerns for Vancomycin allergy by dermatology would switch to Daptomycin  --Would Status 7 with respect to transplant pending 48H on repeat blood cultures and ID of the organism  --Continue to follow CBC with diff  --Continue to follow temperature curve    #Rash  ?Secondary to Cefepime on 11/2?  ?Secondary to hepatitis vaccine?  ?Secondary to Vancomycin  --Recommend Dermatology Consult  --Follow LFT's and CBC with Diff (For Eosinophil Counts)    #Enterococcus Bacteremia, Leukocytosis, Pre-Heart Transplant Evaluation Serologies  Concern for either central line or urinary source  CT A/P Noncontrast (11/18) No acute process  s/p ten-days of IV Vancomycin last dose received on 11/27    #Encounter to Vaccinate Patient  COVID19: COVID19 Infection This Admission. Would consider Vaccination in ~3 months  Influenza: declined  Pneumococcal: Would benefit from PCV20  HAV: received first dose 11/24  HBV: received first dose Heplisav 11/24  MMR: Not Mumps Immune, if >1 month until transplant would consider MMR dose  Varicella: Immune  Shingles: recommend Shingrix series  Tdap: received Tdap booster this admission    I will continue to follow. Please feel free to contact me with any further questions.    Silviano Manuel M.D.  [facility] Division of Infectious Disease  8AM-5PM Monday - Friday: Available on [employer] Teams  After Hours and Holidays (or if no response on [employer] Teams): Please contact the Infectious Diseases Office at [phone]    The above assessment and plan were discussed with CICU Team  59 yo M with PMHx of HTN, CAD w/ 1 stent in 2009, ICH (2008) presented on 11/1 with abn ekg. Patient presented to [facility] where he was found to have STEMI, recommended to get cath however patient did not want to get it there so he left and came here.   EKG here with ST depression in lateral leads and elevation in anterior leads   Prior to [facility] found to be tachycardic, dyspneic, intubated   C 11/1 with chronic total occlusion of LAD and RCA, with elevated RA and PA pressures  TTE 11/1 with severely decreased EF 32%, s/p IABP 11/1    RVP (11/1) Positive for COVID19  RVP (11/10) Negative  BCx (11/2, 11/4) NGTD  ETT Culture (11/2) NGTD  MRSA/MSSA PCR (11/2, 11/10) Negative  UA (11/10) 1 WBC    CXR (11/10) Clear Lungs  TTE (11/2) Left ventricular thrombus.    #Positive Blood Cultures (11/30 - [facility])  Unclear if contaminant or true pathogen  Growth this far from procurement raises question of contaminant  However, did have leukocytosis on the day prior  --Continue Vancomycin. If higher concerns for Vancomycin allergy by dermatology would switch to Daptomycin  --Would Status 7 with respect to transplant pending 48H on repeat blood cultures and ID of the organism  --Continue to follow CBC with diff  --Continue to follow temperature curve    #Rash  ?Secondary to Cefepime on 11/2?  ?Secondary to hepatitis vaccine?  ?Secondary to Vancomycin  --Recommend Dermatology Consult  --Follow LFT's and CBC with Diff (For Eosinophil Counts)    #Enterococcus Bacteremia, Leukocytosis, Pre-Heart Transplant Evaluation Serologies  Concern for either central line or urinary source  CT A/P Noncontrast (11/18) No acute process  s/p ten-days of IV Vancomycin last dose received on 11/27    #Encounter to Vaccinate Patient  COVID19: COVID19 Infection This Admission. Would consider Vaccination in ~3 months  Influenza: declined  Pneumococcal: Would benefit from PCV20  HAV: received first dose 11/24  HBV: received first dose Heplisav 11/24  MMR: Not Mumps Immune, if >1 month until transplant would consider MMR dose  Varicella: Immune  Shingles: recommend Shingrix series  Tdap: received Tdap booster this admission    I will continue to follow. Please feel free to contact me with any further questions.    Silviano Manuel M.D.  [facility] Division of Infectious Disease  8AM-5PM Monday - Friday: Available on [employer] Teams  After Hours and Holidays (or if no response on [employer] Teams): Please contact the Infectious Diseases Office at [phone]    The above assessment and plan were discussed with CICU Team

## 2023-12-05 NOTE — PROGRESS NOTE ADULT - PROBLEM SELECTOR PLAN 5
BCx from 11/17 with GPC in pair/chains in both bottles; Mixed cultures Staph epi and Enterococcus faecalis   Repeat cultures from 11/18; NGTD  Currently on vancomycin. Has remote Hx PCN allergy per ID but unclear as he doesn't recall reaction. Cleared for OHT listing  -s/p IABP exchange from RFA to LFA on 11/20.  - + rods in blood culture on 11/30 (reported on 12/4), restarted on vancomycin, now status 7.   -f/u repeat cultures.   appreciated transplant ID recs.

## 2023-12-05 NOTE — CONSULT NOTE ADULT - ASSESSMENT
# Morbilliform rash--ddx includes 2/2 drug vs viral vs early DIHS.   Relevant inpatient medications:  - cefepime 11/2  - vancomycin 11/18-20, 11/25-27, 12/4-5  - hepatitis A vaccine 11/23, hep B vaccine 11/25, DTAP 11/26, shingrix 11/26    PLAN:   - Please trend daily CBC with diff and CMP  - Please obtain RVP  - Start triamcinolone 0.1% ointment BID to affected areas. SE of prolonged use include skin thinning/striae/steroid acne    Will continue to follow.    Patient was seen at bedside and staffed in person with the dermatology attending Dr. Clark.   Recommendations were communicated with the primary team.  Please page [phone] for further related questions.    Dixie Garcia MD  Resident Physician, PGY3  [facility]  Pager: [phone] # Morbilliform rash--ddx includes 2/2 drug vs viral vs early DIHS.   Relevant inpatient medications:  - cefepime 11/2  - vancomycin 11/18-20, 11/25-27, 12/4-5  - hepatitis A vaccine 11/23, hep B vaccine 11/25, DTAP 11/26, shingrix 11/26    PLAN:   - Please trend daily CBC with diff and CMP  - Please obtain RVP  - Start triamcinolone 0.1% ointment BID to affected areas. SE of prolonged use include skin thinning/striae/steroid acne    Will continue to follow.    Patient was seen at bedside and staffed in person with the dermatology attending Dr. Clark.   Recommendations were communicated with the primary team.  Please page [phone] for further related questions.    Dixie Garcia MD  Resident Physician, PGY3  [facility]  Pager: [phone]

## 2023-12-05 NOTE — PROGRESS NOTE ADULT - PROBLEM SELECTOR PLAN 1
- L IABP at 1:1, placed 11/9. Exchange to LFA given high grade bacteremia on 11/20. On AC with Heparin infusion (also for LV thrombus)  - Continue Coreg 25 mg BID hold for MAP <65  - Continue HDZN 100 mg TID and ISDN 40 mg TID, hold for MAP <65  - Off romel given HyperK  - AT evaluation: Accepted for transplant, currently listed UNOS Status 2. ABO A. PRA 0% 11/13. Last Brilinta dose was on 11/13, However, now status 7 on 12/4 given + blood culture.   -GM + rods in blood culture on 11/30 (reported on 12/4), now status 7, pending repeat cultures  -on vancomycin, appreciate Transplant ID recs.   - Please keep primary Dr. Banuelos [phone] in the loop per family request. - L IABP at 1:1, placed 11/9. Exchange to LFA given high grade bacteremia on 11/20. On AC with Heparin infusion (also for LV thrombus)  - Continue Coreg 25 mg BID hold for MAP <65  - Continue HDZN 100 mg TID and ISDN 40 mg TID, hold for MAP <65  - Off romel given HyperK  - AT evaluation: Accepted for transplant, currently listed UNOS Status 2. ABO A. PRA 0% 11/13. Last Brilinta dose was on 11/13, However, now status 7 on 12/4 given + blood culture.   -GM + rods in blood culture on 11/30 (reported on 12/4), now status 7, pending repeat cultures  -on vancomycin, appreciate Transplant ID recs.   - Please keep primary Dr. Banuelos [phone] in the loop per family request.

## 2023-12-05 NOTE — PROGRESS NOTE ADULT - ATTENDING COMMENTS
Patient found in bed in NAD. He remains on support with IABP on 1:1 and milrinone gtt. He was made status 7 due to a positive blood culture from 11/30. Repeat culture from yesterday is NGTD. His renal function is stable and he is hemodynamically stable.     Continue IABP on 1:1 support   Continue ISDN/hydralazine 40/100 mg TID  Continue carvedilol 25 mg BID    No need for diuretics    Continue Abx until repeat blood cultures final   Monitor renal function   Transplant ID follow up   Discussed with CCU team

## 2023-12-05 NOTE — PROGRESS NOTE ADULT - SUBJECTIVE AND OBJECTIVE BOX
DEVORAH VALENCIA  MRN-[id number]  Patient is a 58y old  Male who presents with a chief complaint of cardiogenic shock (05 Dec 2023 07:32)    HPI:  pt seen and approx 1:30 pm  in [facility]    59yo M w/ hx HTN, CAD w/ 1 stent in , ICH () presenting with abn ekg. Patient presented to [facility] where he was found to have STEMI, recommended to get cath however patient did not want to get it there so it left and came here.  Patient initially had cough, congestion, fever, was placed on antibiotics on .  Started feeling nauseous and had a presyncopal event after which he presented to ED last night.  Had chest pain as well.  Chest pain is midsternal.  Not currently having chest pain.  Received 4 aspirin 30 min pta. (2023 15:11)      Hospital Course:    24 HOUR EVENTS:    REVIEW OF SYSTEMS:    CONSTITUTIONAL: No weakness, fevers or chills  EYES/ENT: No visual changes;  No vertigo or throat pain   NECK: No pain or stiffness  RESPIRATORY: No cough, wheezing, hemoptysis; No shortness of breath  CARDIOVASCULAR: No chest pain or palpitations  GASTROINTESTINAL: No abdominal or epigastric pain. No nausea, vomiting, or hematemesis; No diarrhea or constipation. No melena or hematochezia.  GENITOURINARY: No dysuria, frequency or hematuria  NEUROLOGICAL: No numbness or weakness  SKIN: No itching, rashes      ICU Vital Signs Last 24 Hrs  T(C): 36.8 (05 Dec 2023 19:00), Max: 37.1 (04 Dec 2023 23:00)  T(F): 98.3 (05 Dec 2023 19:00), Max: 98.7 (04 Dec 2023 23:00)  HR: 81 (05 Dec 2023 20:00) (75 - 89)  BP: --  BP(mean): --  ABP: --  ABP(mean): --  RR: 21 (05 Dec 2023 20:00) (15 - 24)  SpO2: 99% (05 Dec 2023 20:00) (97% - 100%)    O2 Parameters below as of 05 Dec 2023 20:00  Patient On (Oxygen Delivery Method): room air            CVP(mm Hg): --  CO: --  CI: --  PA: --  PA(mean): --  PA(direct): --  PCWP: --  LA: --  RA: --  SVR: --  SVRI: --  PVR: --  PVRI: --  I&O's Summary    04 Dec 2023 07:01  -  05 Dec 2023 07:00  --------------------------------------------------------  IN: 1291 mL / OUT: 1450 mL / NET: -159 mL    05 Dec 2023 07:01  -  05 Dec 2023 20:30  --------------------------------------------------------  IN: 1202 mL / OUT: 850 mL / NET: 352 mL        CAPILLARY BLOOD GLUCOSE    CAPILLARY BLOOD GLUCOSE      POCT Blood Glucose.: 129 mg/dL (05 Dec 2023 16:50)      PHYSICAL EXAM:  GENERAL: No acute distress, well-developed  HEAD:  Atraumatic, Normocephalic  EYES: EOMI, PERRLA, conjunctiva and sclera clear  NECK: Supple, no lymphadenopathy, no JVD  CHEST/LUNG: CTAB; No wheezes, rales, or rhonchi  HEART: Regular rate and rhythm. Normal S1/S2. No murmurs, rubs, or gallops  ABDOMEN: Soft, non-tender, non-distended; normal bowel sounds, no organomegaly  EXTREMITIES:  2+ peripheral pulses b/l, No clubbing, cyanosis, or edema  NEUROLOGY: A&O x 3, no focal deficits  SKIN: No rashes or lesions    ============================I/O===========================   I&O's Detail    04 Dec 2023 07:01  -  05 Dec 2023 07:00  --------------------------------------------------------  IN:    Heparin: 336 mL    IV PiggyBack: 275 mL    Oral Fluid: 680 mL  Total IN: 1291 mL    OUT:    Voided (mL): 1450 mL  Total OUT: 1450 mL    Total NET: -159 mL      05 Dec 2023 07:01  -  05 Dec 2023 20:30  --------------------------------------------------------  IN:    Heparin: 182 mL    IV PiggyBack: 500 mL    Oral Fluid: 520 mL  Total IN: 1202 mL    OUT:    Voided (mL): 850 mL  Total OUT: 850 mL    Total NET: 352 mL        ============================ LABS =========================                        12.0   7.66  )-----------( 132      ( 05 Dec 2023 02:41 )             35.9     12-    132<L>  |  102  |  26<H>  ----------------------------<  130<H>  4.4   |  19<L>  |  1.30    Ca    9.4      05 Dec 2023 02:41  Phos  3.3     12-  Mg     2.0     12-    TPro  6.6  /  Alb  3.7  /  TBili  0.3  /  DBili  x   /  AST  36  /  ALT  101<H>  /  AlkPhos  60  12-                LIVER FUNCTIONS - ( 05 Dec 2023 02:41 )  Alb: 3.7 g/dL / Pro: 6.6 g/dL / ALK PHOS: 60 U/L / ALT: 101 U/L / AST: 36 U/L / GGT: x           PT/INR - ( 05 Dec 2023 02:41 )   PT: 11.6 sec;   INR: 1.11 ratio         PTT - ( 05 Dec 2023 02:41 )  PTT:69.2 sec    Lactate, Blood: 1.3 mmol/L (23 @ 02:41)    Urinalysis Basic - ( 05 Dec 2023 02:41 )    Color: x / Appearance: x / SG: x / pH: x  Gluc: 130 mg/dL / Ketone: x  / Bili: x / Urobili: x   Blood: x / Protein: x / Nitrite: x   Leuk Esterase: x / RBC: x / WBC x   Sq Epi: x / Non Sq Epi: x / Bacteria: x      ======================Micro/Rad/Cardio=================  Telemtry: Reviewed   EKG: Reviewed  CXR: Reviewed  Culture: Reviewed   Echo:   Cath: Cardiac Cath Lab - Adult:   [facility]  Department of Cardiology  [address] [place] [address]  [phone]  Cath Lab Report -- Comprehensive Report  Patient: LD VALENCIA  Study date: 2017  Account number: [id number]  MR number: [id number]  : 1965  Gender: Male  Race: W  Case Physician(s):  Jairon Holguin M.D.  Referring Physician:  Luc Lynn M.D.  INDICATIONS: Unstable angina - CCS4.  HISTORY: The patient has a history of coronary artery disease. The patient  hashypertension and medication-treated dyslipidemia.  PROCEDURE:  --  Left heart catheterization with ventriculography.  --  Left coronary angiography.  --  Right coronary angiography.  TECHNIQUE: The risks and alternatives of the procedures and conscious  sedation were explained to the patient and informed consent was obtained.  Cardiac catheterization performed electively.  Local anesthetic given. Right radial artery access. A 6FR PRELUDE KIT was  inserted in the vessel. Left heart catheterization. Ventriculography was  performed. A 5FR FR4.0 EXPO catheter was utilized. Left coronary artery  angiography. The vessel was injected utilizing a 5FR FL3.5 EXPO catheter.  Right coronary artery angiography. The vessel was injected utilizing a 5FR  FR4.0 EXPO catheter. RADIATION EXPOSURE: 1.1 min.  CONTRAST GIVEN: Omnipaque 55 ml.  MEDICATIONS GIVEN: Midazolam, 1 mg, IV. Fentanyl, 25 mcg, IV. Verapamil  (Isoptin, Calan, Covera), 2.5 mg, IA. Heparin, 3000 units, IA.  VENTRICLES: Global left ventricular function was moderately depressed. EF  estimated was 40 %.  CORONARY VESSELS: The coronary circulation is right dominant.  LM:   --  LM: Normal.  LAD:   --  Proximal LAD: There was a 50 % stenosis.  CX:   --  Circumflex: Normal.  RCA:   --  Mid RCA: There was a 40 % stenosis.  --  Distal RCA: There was a 50 % stenosis.  COMPLICATIONS: There were no complications.  DIAGNOSTIC RECOMMENDATIONS: The patient should continue with the present  medications.  Prepared and signed by  Jairon Holguin M.D.  Signed 2017 12:20:13  HEMODYNAMIC TABLES  Pressures:  Baseline/ Room Air  Pressures:  - HR: 78  Pressures:  - Rhythm:  Pressures:  -- Aortic Pressure (S/D/M): --/--/99  Pressures:  -- Left Ventricle (s/edp): 157/39/--  Outputs:  Baseline/ Room Air  Outputs:  -- CALCULATIONS[age]  Outputs:  -- CALCULATIONS: Body Surface Area: 2.05  Outputs:  -- CALCULATIONS: Height in cm: 175.00  Outputs:  -- CALCULATIONS: Sex: Male  Outputs:  -- CALCULATIONS: Weight in k.40 (17 @ 21:55)    ======================================================  PAST MEDICAL & SURGICAL HISTORY:  HTN (hypertension)      CAD (coronary artery disease)  ; stent      Intracranial hemorrhage        Respiratory arrest        Myocardial infarction, unspecified MI type, unspecified artery      History of coronary artery stent placement        ====================ASSESSMENT ==============  58 male with HTN, CAD (s/p PCI ), HFrEF, CVA , and T2DM presenting with chest pressure and unknown tachycardia that was shocked x1, [facility]  found to have in-stent restenosis of pLAD and  of RCA with elevated RA and PA pressures and severely decreased. Admitted to CICU for management of cardiogenic shock and ADHF requiring IABP  -, with hospital course c/b vfib arrest requiring reinsertion of IABP. Currently listed for transplant status 7 due to positive blood culture drawn on .    Plan:  ====================== NEUROLOGY=====================  Anxiety  - No Seroquel/antipsychotics since vfib arrest and prolonged QTC  - Psych Eval, recommended SSRI, but pt. refused   - Psych recommending atarax PRN  - Continue to monitor mental status    PT/Conditioning  - Continue band exercised while on bedrest s/t IABP    ==================== RESPIRATORY======================  Acute Hypoxemic Respiratory Failure  - s/p x2 intubations for cardiogenic pulm edema and the in setting of cardiac arrest, resolved - extubated 11/10  - Currently on room air with spO2 mid 90s  - Continue incentive spirometry and monitoring of sp02    Asthma  - c/w albuterol, symbicort and spiriva  - On trelegy at home  - Continue to monitor SpO2 with goal >94%    ====================CARDIOVASCULAR==================  Vfib arrest i/s/o ischemia  - Lido gtt off   - PO Amio load - total of 5g per EP complete , continue PO amio  - Amio held briefly for rising LFTs, resumed   - Keep K > 4, Mag > 2.2     Cardiogenic shock requiring IABP (- , -)  - Likely 2/2 NSTEMI and ADHF  -  LHC: pLAD 100 % in-stent restenosis & mRCA, 100 %. PCWP 30. IABP placed.  -  TTE: LV dilated. EF 32 %. Regional WMAs present, mod (grade 2) LV diastolic dysfunction  -  TTE: EF 22% and + LV thrombus  -  s/p 500 cc bolus  - IABP swapped  to RFA, continue 1:1 support  - Off Milrinone gtt @ 7:30 am   - James d/c'd due to elevated K levels  - c/w coreg 25 BID for GDMT  - HIT and HAIDER sent (12/3) as per HF   - back to status 7 on  due to positive growth in blood culture drawn on   -  - reduced hydralazine 100 TID to 75 TID and ISD 40 TID to 30 TID for AL reduction    NSTEMI iso stent re-occlusion of pLAD and 100%  of RCA  - EKG on admission w/ LBBB  - DAPT: c/w ASA, Brilinta d/c'd per transplant w/u  - c/w lipitor 80  - cMR deferred given necessity of IABP  - CT sx not recommending CABG, undergoing AT eval    LV thrombus  - c/w heparin gtt    ===================== RENAL =========================  Non-oliguric ARIC, resolved   - Baseline Cr: 1-1  - Renal US: no evidence of renal artery stenosis  - Trend BMP, lytes daily, replace as needed  - Continue Strict I/Os, avoid nephrotoxins    Mild Hyponatremia/Hyperkalemia iso ARIC and or new CKD baseline  - Continue fluid restriction   - Urea powder d/c'd per renal  - Trend daily  - Continue monitoring urine output, lytes, SCr/ BUN  - Replete lytes prn with goal K >4 and Mg >2    =============== GASTROINTESTINAL===================  Constipation/ileus, resolved  - s/p multiple BMs, symptoms improving   - c/w diet    ===================ENDO====================  Type 2 DM  - A1c 8.3  - Continue lantus, premeal, low ISS  - continue FS    ===================HEMATOLOGIC/ONC ===================  - H/H & plts stable  - Monitor H/H and plts  - VTE PPX: heparin gtt    ==================INFECTIOUS DISEASE================  Bacteremia  - Repeat BCx drawn  for leukocytosis to 12k - positive on , G+ rods in anaerobic bottle, suspect contaminant  - Repeat cultures x2 sent  per transplant ID recs  - Vancomycin by trough (- )  - Awaiting final microbial ID     Enterococcus faecalis bacteremia, resolved  - BCx + for enterococcus faecalis x2, Staph epi x1 (likely contaminant)- pan sensitive   - Urine cx  + enterococcus faecalis  - BCx  no growth; repeat  NGTD  - IABP site swapped to RFA   - s/p Vancomycin 1g q12h (-)  - CT A/P negative for infectious pathology    COVID, resolved  - Off airborne precautions     Pre-transplant ID w/u   - Trend ID recs for serologies   - Colonoscopy  - normal   - Chest CT  - improved LLL aeration  - s/p immunizations    ==================INFECTIOUS DISEASE================  Upper Extremity Rash  - Patient developed bilateral upper extremity rash after receiving Hepatitis A & B immunizations on   - Dermatology consulted         Patient requires continuous monitoring with bedside rhythm monitoring, pulse ox monitoring, and intermittent blood gas analysis. Care plan discussed with ICU care team. Patient remained critical and at risk for life threatening decompensation.  Patient seen, examined and plan discussed with CCU team during rounds.     I have personally provided ____ minutes of critical care time excluding time spent on separate procedures, in addition to initial critical care time provided by the CICU Attending, Dr. Lees.    By signing my name below, I, Rosalba Tapia, attest that this documentation has been prepared under the direction and in the presence of KARYN Choi   Electronically signed: Rosalba Phillips, 23 @ 20:30    I, Niurka Brizuela, personally performed the services described in this documentation. all medical record entries made by the scribe were at my direction and in my presence. I have reviewed the chart and agree that the record reflects my personal performance and is accurate and complete  Electronically signed: KARYN Choi        DEVORAH VALENCIA  MRN-[id number]  Patient is a 58y old  Male who presents with a chief complaint of cardiogenic shock (05 Dec 2023 07:32)    HPI:  pt seen and approx 1:30 pm  in [facility]    59yo M w/ hx HTN, CAD w/ 1 stent in , ICH () presenting with abn ekg. Patient presented to [facility] where he was found to have STEMI, recommended to get cath however patient did not want to get it there so it left and came here.  Patient initially had cough, congestion, fever, was placed on antibiotics on .  Started feeling nauseous and had a presyncopal event after which he presented to ED last night.  Had chest pain as well.  Chest pain is midsternal.  Not currently having chest pain.  Received 4 aspirin 30 min pta. (2023 15:11)      Hospital Course:    24 HOUR EVENTS:    REVIEW OF SYSTEMS:    CONSTITUTIONAL: No weakness, fevers or chills  EYES/ENT: No visual changes;  No vertigo or throat pain   NECK: No pain or stiffness  RESPIRATORY: No cough, wheezing, hemoptysis; No shortness of breath  CARDIOVASCULAR: No chest pain or palpitations  GASTROINTESTINAL: No abdominal or epigastric pain. No nausea, vomiting, or hematemesis; No diarrhea or constipation. No melena or hematochezia.  GENITOURINARY: No dysuria, frequency or hematuria  NEUROLOGICAL: No numbness or weakness  SKIN: No itching, rashes      ICU Vital Signs Last 24 Hrs  T(C): 36.8 (05 Dec 2023 19:00), Max: 37.1 (04 Dec 2023 23:00)  T(F): 98.3 (05 Dec 2023 19:00), Max: 98.7 (04 Dec 2023 23:00)  HR: 81 (05 Dec 2023 20:00) (75 - 89)  BP: --  BP(mean): --  ABP: --  ABP(mean): --  RR: 21 (05 Dec 2023 20:00) (15 - 24)  SpO2: 99% (05 Dec 2023 20:00) (97% - 100%)    O2 Parameters below as of 05 Dec 2023 20:00  Patient On (Oxygen Delivery Method): room air            CVP(mm Hg): --  CO: --  CI: --  PA: --  PA(mean): --  PA(direct): --  PCWP: --  LA: --  RA: --  SVR: --  SVRI: --  PVR: --  PVRI: --  I&O's Summary    04 Dec 2023 07:01  -  05 Dec 2023 07:00  --------------------------------------------------------  IN: 1291 mL / OUT: 1450 mL / NET: -159 mL    05 Dec 2023 07:01  -  05 Dec 2023 20:30  --------------------------------------------------------  IN: 1202 mL / OUT: 850 mL / NET: 352 mL        CAPILLARY BLOOD GLUCOSE    CAPILLARY BLOOD GLUCOSE      POCT Blood Glucose.: 129 mg/dL (05 Dec 2023 16:50)      PHYSICAL EXAM:  GENERAL: No acute distress, well-developed  HEAD:  Atraumatic, Normocephalic  EYES: EOMI, PERRLA, conjunctiva and sclera clear  NECK: Supple, no lymphadenopathy, no JVD  CHEST/LUNG: CTAB; No wheezes, rales, or rhonchi  HEART: Regular rate and rhythm. Normal S1/S2. No murmurs, rubs, or gallops  ABDOMEN: Soft, non-tender, non-distended; normal bowel sounds, no organomegaly  EXTREMITIES:  2+ peripheral pulses b/l, No clubbing, cyanosis, or edema  NEUROLOGY: A&O x 3, no focal deficits  SKIN: No rashes or lesions    ============================I/O===========================   I&O's Detail    04 Dec 2023 07:01  -  05 Dec 2023 07:00  --------------------------------------------------------  IN:    Heparin: 336 mL    IV PiggyBack: 275 mL    Oral Fluid: 680 mL  Total IN: 1291 mL    OUT:    Voided (mL): 1450 mL  Total OUT: 1450 mL    Total NET: -159 mL      05 Dec 2023 07:01  -  05 Dec 2023 20:30  --------------------------------------------------------  IN:    Heparin: 182 mL    IV PiggyBack: 500 mL    Oral Fluid: 520 mL  Total IN: 1202 mL    OUT:    Voided (mL): 850 mL  Total OUT: 850 mL    Total NET: 352 mL        ============================ LABS =========================                        12.0   7.66  )-----------( 132      ( 05 Dec 2023 02:41 )             35.9     12-    132<L>  |  102  |  26<H>  ----------------------------<  130<H>  4.4   |  19<L>  |  1.30    Ca    9.4      05 Dec 2023 02:41  Phos  3.3     12-  Mg     2.0     12-    TPro  6.6  /  Alb  3.7  /  TBili  0.3  /  DBili  x   /  AST  36  /  ALT  101<H>  /  AlkPhos  60  12-                LIVER FUNCTIONS - ( 05 Dec 2023 02:41 )  Alb: 3.7 g/dL / Pro: 6.6 g/dL / ALK PHOS: 60 U/L / ALT: 101 U/L / AST: 36 U/L / GGT: x           PT/INR - ( 05 Dec 2023 02:41 )   PT: 11.6 sec;   INR: 1.11 ratio         PTT - ( 05 Dec 2023 02:41 )  PTT:69.2 sec    Lactate, Blood: 1.3 mmol/L (23 @ 02:41)    Urinalysis Basic - ( 05 Dec 2023 02:41 )    Color: x / Appearance: x / SG: x / pH: x  Gluc: 130 mg/dL / Ketone: x  / Bili: x / Urobili: x   Blood: x / Protein: x / Nitrite: x   Leuk Esterase: x / RBC: x / WBC x   Sq Epi: x / Non Sq Epi: x / Bacteria: x      ======================Micro/Rad/Cardio=================  Telemtry: Reviewed   EKG: Reviewed  CXR: Reviewed  Culture: Reviewed   Echo:   Cath: Cardiac Cath Lab - Adult:   [facility]  Department of Cardiology  [address] [place] [address]  [phone]  Cath Lab Report -- Comprehensive Report  Patient: LD VALENCIA  Study date: 2017  Account number: [id number]  MR number: [id number]  : 1965  Gender: Male  Race: W  Case Physician(s):  Jairon Holguin M.D.  Referring Physician:  Luc Lynn M.D.  INDICATIONS: Unstable angina - CCS4.  HISTORY: The patient has a history of coronary artery disease. The patient  hashypertension and medication-treated dyslipidemia.  PROCEDURE:  --  Left heart catheterization with ventriculography.  --  Left coronary angiography.  --  Right coronary angiography.  TECHNIQUE: The risks and alternatives of the procedures and conscious  sedation were explained to the patient and informed consent was obtained.  Cardiac catheterization performed electively.  Local anesthetic given. Right radial artery access. A 6FR PRELUDE KIT was  inserted in the vessel. Left heart catheterization. Ventriculography was  performed. A 5FR FR4.0 EXPO catheter was utilized. Left coronary artery  angiography. The vessel was injected utilizing a 5FR FL3.5 EXPO catheter.  Right coronary artery angiography. The vessel was injected utilizing a 5FR  FR4.0 EXPO catheter. RADIATION EXPOSURE: 1.1 min.  CONTRAST GIVEN: Omnipaque 55 ml.  MEDICATIONS GIVEN: Midazolam, 1 mg, IV. Fentanyl, 25 mcg, IV. Verapamil  (Isoptin, Calan, Covera), 2.5 mg, IA. Heparin, 3000 units, IA.  VENTRICLES: Global left ventricular function was moderately depressed. EF  estimated was 40 %.  CORONARY VESSELS: The coronary circulation is right dominant.  LM:   --  LM: Normal.  LAD:   --  Proximal LAD: There was a 50 % stenosis.  CX:   --  Circumflex: Normal.  RCA:   --  Mid RCA: There was a 40 % stenosis.  --  Distal RCA: There was a 50 % stenosis.  COMPLICATIONS: There were no complications.  DIAGNOSTIC RECOMMENDATIONS: The patient should continue with the present  medications.  Prepared and signed by  Jairon Holguin M.D.  Signed 2017 12:20:13  HEMODYNAMIC TABLES  Pressures:  Baseline/ Room Air  Pressures:  - HR: 78  Pressures:  - Rhythm:  Pressures:  -- Aortic Pressure (S/D/M): --/--/99  Pressures:  -- Left Ventricle (s/edp): 157/39/--  Outputs:  Baseline/ Room Air  Outputs:  -- CALCULATIONS[age]  Outputs:  -- CALCULATIONS: Body Surface Area: 2.05  Outputs:  -- CALCULATIONS: Height in cm: 175.00  Outputs:  -- CALCULATIONS: Sex: Male  Outputs:  -- CALCULATIONS: Weight in k.40 (17 @ 21:55)    ======================================================  PAST MEDICAL & SURGICAL HISTORY:  HTN (hypertension)      CAD (coronary artery disease)  ; stent      Intracranial hemorrhage        Respiratory arrest        Myocardial infarction, unspecified MI type, unspecified artery      History of coronary artery stent placement        ====================ASSESSMENT ==============  58 male with HTN, CAD (s/p PCI ), HFrEF, CVA , and T2DM presenting with chest pressure and unknown tachycardia that was shocked x1, [facility]  found to have in-stent restenosis of pLAD and  of RCA with elevated RA and PA pressures and severely decreased. Admitted to CICU for management of cardiogenic shock and ADHF requiring IABP  -, with hospital course c/b vfib arrest requiring reinsertion of IABP. Currently listed for transplant status 7 due to positive blood culture drawn on .    Plan:  ====================== NEUROLOGY=====================  Anxiety  - No Seroquel/antipsychotics since vfib arrest and prolonged QTC  - Psych Eval, recommended SSRI, but pt. refused   - Psych recommending atarax PRN  - Continue to monitor mental status    PT/Conditioning  - Continue band exercised while on bedrest s/t IABP    ==================== RESPIRATORY======================  Acute Hypoxemic Respiratory Failure  - s/p x2 intubations for cardiogenic pulm edema and the in setting of cardiac arrest, resolved - extubated 11/10  - Currently on room air with spO2 mid 90s  - Continue incentive spirometry and monitoring of sp02    Asthma  - c/w albuterol, symbicort and spiriva  - On trelegy at home  - Continue to monitor SpO2 with goal >94%    ====================CARDIOVASCULAR==================  Vfib arrest i/s/o ischemia  - Lido gtt off   - PO Amio load - total of 5g per EP complete , continue PO amio  - Amio held briefly for rising LFTs, resumed   - Keep K > 4, Mag > 2.2     Cardiogenic shock requiring IABP (- , -)  - Likely 2/2 NSTEMI and ADHF  -  LHC: pLAD 100 % in-stent restenosis & mRCA, 100 %. PCWP 30. IABP placed.  -  TTE: LV dilated. EF 32 %. Regional WMAs present, mod (grade 2) LV diastolic dysfunction  -  TTE: EF 22% and + LV thrombus  -  s/p 500 cc bolus  - IABP swapped  to RFA, continue 1:1 support  - Off Milrinone gtt @ 7:30 am   - James d/c'd due to elevated K levels  - c/w coreg 25 BID for GDMT  - HIT and HAIDER sent (12/3) as per HF   - back to status 7 on  due to positive growth in blood culture drawn on   -  - reduced hydralazine 100 TID to 75 TID and ISD 40 TID to 30 TID for AL reduction    NSTEMI iso stent re-occlusion of pLAD and 100%  of RCA  - EKG on admission w/ LBBB  - DAPT: c/w ASA, Brilinta d/c'd per transplant w/u  - c/w lipitor 80  - cMR deferred given necessity of IABP  - CT sx not recommending CABG, undergoing AT eval    LV thrombus  - c/w heparin gtt    ===================== RENAL =========================  Non-oliguric ARIC, resolved   - Baseline Cr: 1-1  - Renal US: no evidence of renal artery stenosis  - Trend BMP, lytes daily, replace as needed  - Continue Strict I/Os, avoid nephrotoxins    Mild Hyponatremia/Hyperkalemia iso ARIC and or new CKD baseline  - Continue fluid restriction   - Urea powder d/c'd per renal  - Trend daily  - Continue monitoring urine output, lytes, SCr/ BUN  - Replete lytes prn with goal K >4 and Mg >2    =============== GASTROINTESTINAL===================  Constipation/ileus, resolved  - s/p multiple BMs, symptoms improving   - c/w diet    ===================ENDO====================  Type 2 DM  - A1c 8.3  - Continue lantus, premeal, low ISS  - continue FS    ===================HEMATOLOGIC/ONC ===================  - H/H & plts stable  - Monitor H/H and plts  - VTE PPX: heparin gtt    ==================INFECTIOUS DISEASE================  Bacteremia  - Repeat BCx drawn  for leukocytosis to 12k - positive on , G+ rods in anaerobic bottle, suspect contaminant  - Repeat cultures x2 sent  per transplant ID recs  - Vancomycin by trough (- )  - Awaiting final microbial ID     Enterococcus faecalis bacteremia, resolved  - BCx + for enterococcus faecalis x2, Staph epi x1 (likely contaminant)- pan sensitive   - Urine cx  + enterococcus faecalis  - BCx  no growth; repeat  NGTD  - IABP site swapped to RFA   - s/p Vancomycin 1g q12h (-)  - CT A/P negative for infectious pathology    COVID, resolved  - Off airborne precautions     Pre-transplant ID w/u   - Trend ID recs for serologies   - Colonoscopy  - normal   - Chest CT  - improved LLL aeration  - s/p immunizations    ==================INFECTIOUS DISEASE================  Upper Extremity Rash  - Patient developed bilateral upper extremity rash after receiving Hepatitis A & B immunizations on   - Dermatology consulted         Patient requires continuous monitoring with bedside rhythm monitoring, pulse ox monitoring, and intermittent blood gas analysis. Care plan discussed with ICU care team. Patient remained critical and at risk for life threatening decompensation.  Patient seen, examined and plan discussed with CCU team during rounds.     I have personally provided ____ minutes of critical care time excluding time spent on separate procedures, in addition to initial critical care time provided by the CICU Attending, Dr. Lees.    By signing my name below, I, Rosalba Tapia, attest that this documentation has been prepared under the direction and in the presence of KARYN Choi   Electronically signed: Rosalba Phillips, 23 @ 20:30    I, Niurka Brizuela, personally performed the services described in this documentation. all medical record entries made by the scribe were at my direction and in my presence. I have reviewed the chart and agree that the record reflects my personal performance and is accurate and complete  Electronically signed: KARYN Choi        DEVORAH VALENCIA  MRN-[id number]  Patient is a 58y old  Male who presents with a chief complaint of cardiogenic shock (05 Dec 2023 07:32)    HPI:  pt seen and approx 1:30 pm  in [facility]    59yo M w/ hx HTN, CAD w/ 1 stent in , ICH () presenting with abn ekg. Patient presented to [facility] where he was found to have STEMI, recommended to get cath however patient did not want to get it there so it left and came here.  Patient initially had cough, congestion, fever, was placed on antibiotics on .  Started feeling nauseous and had a presyncopal event after which he presented to ED last night.  Had chest pain as well.  Chest pain is midsternal.  Not currently having chest pain.  Received 4 aspirin 30 min pta. (2023 15:11)    24 HOUR EVENTS:  - No acute events  - Continue vanco BL for ? bacteremia    REVIEW OF SYSTEMS:  CONSTITUTIONAL: No weakness, fevers or chills  EYES/ENT: No visual changes;  No vertigo or throat pain   NECK: No pain or stiffness  RESPIRATORY: No cough, wheezing, hemoptysis; No shortness of breath  CARDIOVASCULAR: No chest pain or palpitations  GASTROINTESTINAL: No abdominal or epigastric pain. No nausea, vomiting, or hematemesis; No diarrhea or constipation. No melena or hematochezia.  GENITOURINARY: No dysuria, frequency or hematuria  NEUROLOGICAL: No numbness or weakness  SKIN: No itching, rashes      ICU Vital Signs Last 24 Hrs  T(C): 36.8 (05 Dec 2023 19:00), Max: 37.1 (04 Dec 2023 23:00)  T(F): 98.3 (05 Dec 2023 19:00), Max: 98.7 (04 Dec 2023 23:00)  HR: 81 (05 Dec 2023 20:00) (75 - 89)  BP: --  BP(mean): --  ABP: --  ABP(mean): --  RR: 21 (05 Dec 2023 20:00) (15 - 24)  SpO2: 99% (05 Dec 2023 20:00) (97% - 100%)    O2 Parameters below as of 05 Dec 2023 20:00  Patient On (Oxygen Delivery Method): room air            CVP(mm Hg): --  CO: --  CI: --  PA: --  PA(mean): --  PA(direct): --  PCWP: --  LA: --  RA: --  SVR: --  SVRI: --  PVR: --  PVRI: --  I&O's Summary    04 Dec 2023 07:01  -  05 Dec 2023 07:00  --------------------------------------------------------  IN: 1291 mL / OUT: 1450 mL / NET: -159 mL    05 Dec 2023 07:  -  05 Dec 2023 20:30  --------------------------------------------------------  IN: 1202 mL / OUT: 850 mL / NET: 352 mL        CAPILLARY BLOOD GLUCOSE    CAPILLARY BLOOD GLUCOSE      POCT Blood Glucose.: 129 mg/dL (05 Dec 2023 16:50)      PHYSICAL EXAM:  GENERAL: No acute distress, well-developed  HEAD:  Atraumatic, Normocephalic  EYES: EOMI, PERRLA, conjunctiva and sclera clear  NECK: Supple, no lymphadenopathy, no JVD  CHEST/LUNG: CTAB; No wheezes, rales, or rhonchi  HEART: Regular rate and rhythm. Normal S1/S2. No murmurs, rubs, or gallops  ABDOMEN: Soft, non-tender, non-distended; normal bowel sounds, no organomegaly  EXTREMITIES:  2+ peripheral pulses b/l, No clubbing, cyanosis, or edema. RF IABP site clean, dry, intact, nontender  NEUROLOGY: A&O x 3, no focal deficits  SKIN: No rashes or lesions    ============================I/O===========================   I&O's Detail    04 Dec 2023 07:01  -  05 Dec 2023 07:00  --------------------------------------------------------  IN:    Heparin: 336 mL    IV PiggyBack: 275 mL    Oral Fluid: 680 mL  Total IN: 1291 mL    OUT:    Voided (mL): 1450 mL  Total OUT: 1450 mL    Total NET: -159 mL      05 Dec 2023 07:01  -  05 Dec 2023 20:30  --------------------------------------------------------  IN:    Heparin: 182 mL    IV PiggyBack: 500 mL    Oral Fluid: 520 mL  Total IN: 1202 mL    OUT:    Voided (mL): 850 mL  Total OUT: 850 mL    Total NET: 352 mL        ============================ LABS =========================                        12.0   7.66  )-----------( 132      ( 05 Dec 2023 02:41 )             35.9     12-    132<L>  |  102  |  26<H>  ----------------------------<  130<H>  4.4   |  19<L>  |  1.30    Ca    9.4      05 Dec 2023 02:41  Phos  3.3     12-  Mg     2.0     12-    TPro  6.6  /  Alb  3.7  /  TBili  0.3  /  DBili  x   /  AST  36  /  ALT  101<H>  /  AlkPhos  60  12-                LIVER FUNCTIONS - ( 05 Dec 2023 02:41 )  Alb: 3.7 g/dL / Pro: 6.6 g/dL / ALK PHOS: 60 U/L / ALT: 101 U/L / AST: 36 U/L / GGT: x           PT/INR - ( 05 Dec 2023 02:41 )   PT: 11.6 sec;   INR: 1.11 ratio         PTT - ( 05 Dec 2023 02:41 )  PTT:69.2 sec    Lactate, Blood: 1.3 mmol/L (23 @ 02:41)    Urinalysis Basic - ( 05 Dec 2023 02:41 )    Color: x / Appearance: x / SG: x / pH: x  Gluc: 130 mg/dL / Ketone: x  / Bili: x / Urobili: x   Blood: x / Protein: x / Nitrite: x   Leuk Esterase: x / RBC: x / WBC x   Sq Epi: x / Non Sq Epi: x / Bacteria: x      ======================Micro/Rad/Cardio=================  Telemtry: Reviewed   EKG: Reviewed  CXR: Reviewed  Culture: Reviewed   Echo:   Cath: Cardiac Cath Lab - Adult:   [facility]  Department of Cardiology  [address] [place] [address]  [phone]  Cath Lab Report -- Comprehensive Report  Patient: LD VALENCIA  Study date: 2017  Account number: [id number]  MR number: [id number]  : 1965  Gender: Male  Race: W  Case Physician(s):  Jairon Holguin M.D.  Referring Physician:  Luc Lynn M.D.  INDICATIONS: Unstable angina - CCS4.  HISTORY: The patient has a history of coronary artery disease. The patient  hashypertension and medication-treated dyslipidemia.  PROCEDURE:  --  Left heart catheterization with ventriculography.  --  Left coronary angiography.  --  Right coronary angiography.  TECHNIQUE: The risks and alternatives of the procedures and conscious  sedation were explained to the patient and informed consent was obtained.  Cardiac catheterization performed electively.  Local anesthetic given. Right radial artery access. A 6FR PRELUDE KIT was  inserted in the vessel. Left heart catheterization. Ventriculography was  performed. A 5FR FR4.0 EXPO catheter was utilized. Left coronary artery  angiography. The vessel was injected utilizing a 5FR FL3.5 EXPO catheter.  Right coronary artery angiography. The vessel was injected utilizing a 5FR  FR4.0 EXPO catheter. RADIATION EXPOSURE: 1.1 min.  CONTRAST GIVEN: Omnipaque 55 ml.  MEDICATIONS GIVEN: Midazolam, 1 mg, IV. Fentanyl, 25 mcg, IV. Verapamil  (Isoptin, Calan, Covera), 2.5 mg, IA. Heparin, 3000 units, IA.  VENTRICLES: Global left ventricular function was moderately depressed. EF  estimated was 40 %.  CORONARY VESSELS: The coronary circulation is right dominant.  LM:   --  LM: Normal.  LAD:   --  Proximal LAD: There was a 50 % stenosis.  CX:   --  Circumflex: Normal.  RCA:   --  Mid RCA: There was a 40 % stenosis.  --  Distal RCA: There was a 50 % stenosis.  COMPLICATIONS: There were no complications.  DIAGNOSTIC RECOMMENDATIONS: The patient should continue with the present  medications.  Prepared and signed by  Jairon Holguin M.D.  Signed 2017 12:20:13  HEMODYNAMIC TABLES  Pressures:  Baseline/ Room Air  Pressures:  - HR: 78  Pressures:  - Rhythm:  Pressures:  -- Aortic Pressure (S/D/M): --/--/99  Pressures:  -- Left Ventricle (s/edp): 157/39/--  Outputs:  Baseline/ Room Air  Outputs:  -- CALCULATIONS[age]  Outputs:  -- CALCULATIONS: Body Surface Area: 2.05  Outputs:  -- CALCULATIONS: Height in cm: 175.00  Outputs:  -- CALCULATIONS: Sex: Male  Outputs:  -- CALCULATIONS: Weight in k.40 (17 @ 21:55)    ======================================================  PAST MEDICAL & SURGICAL HISTORY:  HTN (hypertension)      CAD (coronary artery disease)  ; stent      Intracranial hemorrhage        Respiratory arrest        Myocardial infarction, unspecified MI type, unspecified artery      History of coronary artery stent placement        ====================ASSESSMENT ==============  58 male with HTN, CAD (s/p PCI ), HFrEF, CVA , and T2DM presenting with chest pressure and unknown tachycardia that was shocked x1, [facility]  found to have in-stent restenosis of pLAD and  of RCA with elevated RA and PA pressures and severely decreased. Admitted to CICU for management of cardiogenic shock and ADHF requiring IABP  -, with hospital course c/b vfib arrest requiring reinsertion of IABP. Currently listed for transplant status 7 due to positive blood culture drawn on .    Plan:  ====================== NEUROLOGY=====================  Anxiety  - No Seroquel/antipsychotics since vfib arrest and prolonged QTC  - Psych Eval, recommended SSRI, but pt. refused   - Psych recommending atarax PRN  - Continue to monitor mental status    PT/Conditioning  - Continue band exercised while on bedrest s/t IABP    ==================== RESPIRATORY======================  Acute Hypoxemic Respiratory Failure  - s/p x2 intubations for cardiogenic pulm edema and the in setting of cardiac arrest, resolved - extubated 11/10  - Currently on room air with spO2 mid 90s  - Continue incentive spirometry and monitoring of sp02    Asthma  - c/w albuterol, symbicort and spiriva  - On trelegy at home  - Continue to monitor SpO2 with goal >94%    ====================CARDIOVASCULAR==================  Vfib arrest i/s/o ischemia  - Lido gtt off   - s/p PO Amio load - total of 5g per EP complete   - Amio held for rising LFTs  - Keep K > 4, Mag > 2.2     Cardiogenic shock requiring IABP (- , -)  - Likely 2/2 NSTEMI and ADHF  -  LHC: pLAD 100 % in-stent restenosis & mRCA, 100 %. PCWP 30. IABP placed.  -  TTE: LV dilated. EF 32 %. Regional WMAs present, mod (grade 2) LV diastolic dysfunction  -  TTE: EF 22% and + LV thrombus  -  s/p 500 cc bolus  - IABP swapped  to RFA, continue 1:1 support  - Off Milrinone gtt @ 7:30 am   - James d/c'd due to elevated K levels  - c/w coreg 25 BID for GDMT  - HIT and HAIDER sent (12/3) as per HF   - back to status 7 on  due to positive growth in blood culture drawn on   -  - reduced hydralazine 100 TID to 75 TID and ISD 40 TID to 30 TID for AL reduction    NSTEMI iso stent re-occlusion of pLAD and 100%  of RCA  - EKG on admission w/ LBBB  - DAPT: c/w ASA, Brilinta d/c'd per transplant w/u  - c/w lipitor 80  - cMR deferred given necessity of IABP  - CT sx not recommending CABG, undergoing AT eval    LV thrombus  - c/w heparin gtt    ===================== RENAL =========================  Non-oliguric ARIC, resolved   - Baseline Cr: 1-  - Renal US: no evidence of renal artery stenosis  - Trend BMP, lytes daily, replace as needed  - Continue Strict I/Os, avoid nephrotoxins    Mild Hyponatremia/Hyperkalemia iso ARIC and or new CKD baseline  - Continue fluid restriction   - Urea powder d/c'd per renal  - Trend daily  - Continue monitoring urine output, lytes, SCr/ BUN  - Replete lytes prn with goal K >4 and Mg >2    =============== GASTROINTESTINAL===================  Constipation/ileus, resolved  - s/p multiple BMs, symptoms improving   - c/w diet    ===================ENDO====================  Type 2 DM  - A1c 8.3  - Continue lantus, premeal, low ISS  - continue FS    ===================HEMATOLOGIC/ONC ===================  - H/H & plts stable  - Monitor H/H and plts  - VTE PPX: heparin gtt    ==================INFECTIOUS DISEASE================  Bacteremia  - Repeat BCx drawn  for leukocytosis to 12k - positive on , G+ rods in anaerobic bottle, suspect contaminant  - Repeat cultures x2 sent  per transplant ID recs  - Vancomycin by trough (- )  - Awaiting final microbial ID     Enterococcus faecalis bacteremia, resolved  - BCx + for enterococcus faecalis x2, Staph epi x1 (likely contaminant)- pan sensitive   - Urine cx  + enterococcus faecalis  - BCx  no growth; repeat  NGTD  - IABP site swapped to RFA   - s/p Vancomycin 1g q12h (-)  - CT A/P negative for infectious pathology    COVID, resolved  - Off airborne precautions     Pre-transplant ID w/u   - Trend ID recs for serologies   - Colonoscopy  - normal   - Chest CT  - improved LLL aeration  - s/p immunizations    ==================INFECTIOUS DISEASE================  Upper Extremity Rash  - Patient developed bilateral upper extremity rash after receiving Hepatitis A & B immunizations on   - Dermatology consulted         Patient requires continuous monitoring with bedside rhythm monitoring, pulse ox monitoring, and intermittent blood gas analysis. Care plan discussed with ICU care team. Patient remained critical and at risk for life threatening decompensation.  Patient seen, examined and plan discussed with CCU team during rounds.     I have personally provided 35 minutes of critical care time excluding time spent on separate procedures, in addition to initial critical care time provided by the CICU Attending, Dr. Lese.    By signing my name below, I, Rosalba Tapia, attest that this documentation has been prepared under the direction and in the presence of KARYN Choi   Electronically signed: Rosalba Phillips, 23 @ 20:30    I, Niurka Brizuela, personally performed the services described in this documentation. all medical record entries made by the scribe were at my direction and in my presence. I have reviewed the chart and agree that the record reflects my personal performance and is accurate and complete  Electronically signed: KARYN Choi        DEVORAH VALENCIA  MRN-[id number]  Patient is a 58y old  Male who presents with a chief complaint of cardiogenic shock (05 Dec 2023 07:32)    HPI:  pt seen and approx 1:30 pm  in [facility]    57yo M w/ hx HTN, CAD w/ 1 stent in , ICH () presenting with abn ekg. Patient presented to [facility] where he was found to have STEMI, recommended to get cath however patient did not want to get it there so it left and came here.  Patient initially had cough, congestion, fever, was placed on antibiotics on .  Started feeling nauseous and had a presyncopal event after which he presented to ED last night.  Had chest pain as well.  Chest pain is midsternal.  Not currently having chest pain.  Received 4 aspirin 30 min pta. (2023 15:11)    24 HOUR EVENTS:  - No acute events  - Continue vanco BL for ? bacteremia    REVIEW OF SYSTEMS:  CONSTITUTIONAL: No weakness, fevers or chills  EYES/ENT: No visual changes;  No vertigo or throat pain   NECK: No pain or stiffness  RESPIRATORY: No cough, wheezing, hemoptysis; No shortness of breath  CARDIOVASCULAR: No chest pain or palpitations  GASTROINTESTINAL: No abdominal or epigastric pain. No nausea, vomiting, or hematemesis; No diarrhea or constipation. No melena or hematochezia.  GENITOURINARY: No dysuria, frequency or hematuria  NEUROLOGICAL: No numbness or weakness  SKIN: No itching, rashes      ICU Vital Signs Last 24 Hrs  T(C): 36.8 (05 Dec 2023 19:00), Max: 37.1 (04 Dec 2023 23:00)  T(F): 98.3 (05 Dec 2023 19:00), Max: 98.7 (04 Dec 2023 23:00)  HR: 81 (05 Dec 2023 20:00) (75 - 89)  BP: --  BP(mean): --  ABP: --  ABP(mean): --  RR: 21 (05 Dec 2023 20:00) (15 - 24)  SpO2: 99% (05 Dec 2023 20:00) (97% - 100%)    O2 Parameters below as of 05 Dec 2023 20:00  Patient On (Oxygen Delivery Method): room air            CVP(mm Hg): --  CO: --  CI: --  PA: --  PA(mean): --  PA(direct): --  PCWP: --  LA: --  RA: --  SVR: --  SVRI: --  PVR: --  PVRI: --  I&O's Summary    04 Dec 2023 07:01  -  05 Dec 2023 07:00  --------------------------------------------------------  IN: 1291 mL / OUT: 1450 mL / NET: -159 mL    05 Dec 2023 07:  -  05 Dec 2023 20:30  --------------------------------------------------------  IN: 1202 mL / OUT: 850 mL / NET: 352 mL        CAPILLARY BLOOD GLUCOSE    CAPILLARY BLOOD GLUCOSE      POCT Blood Glucose.: 129 mg/dL (05 Dec 2023 16:50)      PHYSICAL EXAM:  GENERAL: No acute distress, well-developed  HEAD:  Atraumatic, Normocephalic  EYES: EOMI, PERRLA, conjunctiva and sclera clear  NECK: Supple, no lymphadenopathy, no JVD  CHEST/LUNG: CTAB; No wheezes, rales, or rhonchi  HEART: Regular rate and rhythm. Normal S1/S2. No murmurs, rubs, or gallops  ABDOMEN: Soft, non-tender, non-distended; normal bowel sounds, no organomegaly  EXTREMITIES:  2+ peripheral pulses b/l, No clubbing, cyanosis, or edema. RF IABP site clean, dry, intact, nontender  NEUROLOGY: A&O x 3, no focal deficits  SKIN: No rashes or lesions    ============================I/O===========================   I&O's Detail    04 Dec 2023 07:01  -  05 Dec 2023 07:00  --------------------------------------------------------  IN:    Heparin: 336 mL    IV PiggyBack: 275 mL    Oral Fluid: 680 mL  Total IN: 1291 mL    OUT:    Voided (mL): 1450 mL  Total OUT: 1450 mL    Total NET: -159 mL      05 Dec 2023 07:01  -  05 Dec 2023 20:30  --------------------------------------------------------  IN:    Heparin: 182 mL    IV PiggyBack: 500 mL    Oral Fluid: 520 mL  Total IN: 1202 mL    OUT:    Voided (mL): 850 mL  Total OUT: 850 mL    Total NET: 352 mL        ============================ LABS =========================                        12.0   7.66  )-----------( 132      ( 05 Dec 2023 02:41 )             35.9     12-    132<L>  |  102  |  26<H>  ----------------------------<  130<H>  4.4   |  19<L>  |  1.30    Ca    9.4      05 Dec 2023 02:41  Phos  3.3     12-  Mg     2.0     12-    TPro  6.6  /  Alb  3.7  /  TBili  0.3  /  DBili  x   /  AST  36  /  ALT  101<H>  /  AlkPhos  60  12-                LIVER FUNCTIONS - ( 05 Dec 2023 02:41 )  Alb: 3.7 g/dL / Pro: 6.6 g/dL / ALK PHOS: 60 U/L / ALT: 101 U/L / AST: 36 U/L / GGT: x           PT/INR - ( 05 Dec 2023 02:41 )   PT: 11.6 sec;   INR: 1.11 ratio         PTT - ( 05 Dec 2023 02:41 )  PTT:69.2 sec    Lactate, Blood: 1.3 mmol/L (23 @ 02:41)    Urinalysis Basic - ( 05 Dec 2023 02:41 )    Color: x / Appearance: x / SG: x / pH: x  Gluc: 130 mg/dL / Ketone: x  / Bili: x / Urobili: x   Blood: x / Protein: x / Nitrite: x   Leuk Esterase: x / RBC: x / WBC x   Sq Epi: x / Non Sq Epi: x / Bacteria: x      ======================Micro/Rad/Cardio=================  Telemtry: Reviewed   EKG: Reviewed  CXR: Reviewed  Culture: Reviewed   Echo:   Cath: Cardiac Cath Lab - Adult:   [facility]  Department of Cardiology  [address] [place] [address]  [phone]  Cath Lab Report -- Comprehensive Report  Patient: LD VALENCIA  Study date: 2017  Account number: [id number]  MR number: [id number]  : 1965  Gender: Male  Race: W  Case Physician(s):  Jairon Holguin M.D.  Referring Physician:  Luc Lynn M.D.  INDICATIONS: Unstable angina - CCS4.  HISTORY: The patient has a history of coronary artery disease. The patient  hashypertension and medication-treated dyslipidemia.  PROCEDURE:  --  Left heart catheterization with ventriculography.  --  Left coronary angiography.  --  Right coronary angiography.  TECHNIQUE: The risks and alternatives of the procedures and conscious  sedation were explained to the patient and informed consent was obtained.  Cardiac catheterization performed electively.  Local anesthetic given. Right radial artery access. A 6FR PRELUDE KIT was  inserted in the vessel. Left heart catheterization. Ventriculography was  performed. A 5FR FR4.0 EXPO catheter was utilized. Left coronary artery  angiography. The vessel was injected utilizing a 5FR FL3.5 EXPO catheter.  Right coronary artery angiography. The vessel was injected utilizing a 5FR  FR4.0 EXPO catheter. RADIATION EXPOSURE: 1.1 min.  CONTRAST GIVEN: Omnipaque 55 ml.  MEDICATIONS GIVEN: Midazolam, 1 mg, IV. Fentanyl, 25 mcg, IV. Verapamil  (Isoptin, Calan, Covera), 2.5 mg, IA. Heparin, 3000 units, IA.  VENTRICLES: Global left ventricular function was moderately depressed. EF  estimated was 40 %.  CORONARY VESSELS: The coronary circulation is right dominant.  LM:   --  LM: Normal.  LAD:   --  Proximal LAD: There was a 50 % stenosis.  CX:   --  Circumflex: Normal.  RCA:   --  Mid RCA: There was a 40 % stenosis.  --  Distal RCA: There was a 50 % stenosis.  COMPLICATIONS: There were no complications.  DIAGNOSTIC RECOMMENDATIONS: The patient should continue with the present  medications.  Prepared and signed by  Jairon Holguin M.D.  Signed 2017 12:20:13  HEMODYNAMIC TABLES  Pressures:  Baseline/ Room Air  Pressures:  - HR: 78  Pressures:  - Rhythm:  Pressures:  -- Aortic Pressure (S/D/M): --/--/99  Pressures:  -- Left Ventricle (s/edp): 157/39/--  Outputs:  Baseline/ Room Air  Outputs:  -- CALCULATIONS[age]  Outputs:  -- CALCULATIONS: Body Surface Area: 2.05  Outputs:  -- CALCULATIONS: Height in cm: 175.00  Outputs:  -- CALCULATIONS: Sex: Male  Outputs:  -- CALCULATIONS: Weight in k.40 (17 @ 21:55)    ======================================================  PAST MEDICAL & SURGICAL HISTORY:  HTN (hypertension)      CAD (coronary artery disease)  ; stent      Intracranial hemorrhage        Respiratory arrest        Myocardial infarction, unspecified MI type, unspecified artery      History of coronary artery stent placement        ====================ASSESSMENT ==============  58 male with HTN, CAD (s/p PCI ), HFrEF, CVA , and T2DM presenting with chest pressure and unknown tachycardia that was shocked x1, [facility]  found to have in-stent restenosis of pLAD and  of RCA with elevated RA and PA pressures and severely decreased. Admitted to CICU for management of cardiogenic shock and ADHF requiring IABP  -, with hospital course c/b vfib arrest requiring reinsertion of IABP. Currently listed for transplant status 7 due to positive blood culture drawn on .    Plan:  ====================== NEUROLOGY=====================  Anxiety  - No Seroquel/antipsychotics since vfib arrest and prolonged QTC  - Psych Eval, recommended SSRI, but pt. refused   - Psych recommending atarax PRN  - Continue to monitor mental status    PT/Conditioning  - Continue band exercised while on bedrest s/t IABP    ==================== RESPIRATORY======================  Acute Hypoxemic Respiratory Failure  - s/p x2 intubations for cardiogenic pulm edema and the in setting of cardiac arrest, resolved - extubated 11/10  - Currently on room air with spO2 mid 90s  - Continue incentive spirometry and monitoring of sp02    Asthma  - c/w albuterol, symbicort and spiriva  - On trelegy at home  - Continue to monitor SpO2 with goal >94%    ====================CARDIOVASCULAR==================  Vfib arrest i/s/o ischemia  - Lido gtt off   - s/p PO Amio load - total of 5g per EP complete   - Amio held for rising LFTs  - Keep K > 4, Mag > 2.2     Cardiogenic shock requiring IABP (- , -)  - Likely 2/2 NSTEMI and ADHF  -  LHC: pLAD 100 % in-stent restenosis & mRCA, 100 %. PCWP 30. IABP placed.  -  TTE: LV dilated. EF 32 %. Regional WMAs present, mod (grade 2) LV diastolic dysfunction  -  TTE: EF 22% and + LV thrombus  -  s/p 500 cc bolus  - IABP swapped  to RFA, continue 1:1 support  - Off Milrinone gtt @ 7:30 am   - James d/c'd due to elevated K levels  - c/w coreg 25 BID for GDMT  - HIT and HAIDER sent (12/3) as per HF   - back to status 7 on  due to positive growth in blood culture drawn on   -  - reduced hydralazine 100 TID to 75 TID and ISD 40 TID to 30 TID for AL reduction    NSTEMI iso stent re-occlusion of pLAD and 100%  of RCA  - EKG on admission w/ LBBB  - DAPT: c/w ASA, Brilinta d/c'd per transplant w/u  - c/w lipitor 80  - cMR deferred given necessity of IABP  - CT sx not recommending CABG, undergoing AT eval    LV thrombus  - c/w heparin gtt    ===================== RENAL =========================  Non-oliguric ARIC, resolved   - Baseline Cr: 1-  - Renal US: no evidence of renal artery stenosis  - Trend BMP, lytes daily, replace as needed  - Continue Strict I/Os, avoid nephrotoxins    Mild Hyponatremia/Hyperkalemia iso ARIC and or new CKD baseline  - Continue fluid restriction   - Urea powder d/c'd per renal  - Trend daily  - Continue monitoring urine output, lytes, SCr/ BUN  - Replete lytes prn with goal K >4 and Mg >2    =============== GASTROINTESTINAL===================  Constipation/ileus, resolved  - s/p multiple BMs, symptoms improving   - c/w diet    ===================ENDO====================  Type 2 DM  - A1c 8.3  - Continue lantus, premeal, low ISS  - continue FS    ===================HEMATOLOGIC/ONC ===================  - H/H & plts stable  - Monitor H/H and plts  - VTE PPX: heparin gtt    ==================INFECTIOUS DISEASE================  Bacteremia  - Repeat BCx drawn  for leukocytosis to 12k - positive on , G+ rods in anaerobic bottle, suspect contaminant  - Repeat cultures x2 sent  per transplant ID recs  - Vancomycin by trough (- )  - Awaiting final microbial ID     Enterococcus faecalis bacteremia, resolved  - BCx + for enterococcus faecalis x2, Staph epi x1 (likely contaminant)- pan sensitive   - Urine cx  + enterococcus faecalis  - BCx  no growth; repeat  NGTD  - IABP site swapped to RFA   - s/p Vancomycin 1g q12h (-)  - CT A/P negative for infectious pathology    COVID, resolved  - Off airborne precautions     Pre-transplant ID w/u   - Trend ID recs for serologies   - Colonoscopy  - normal   - Chest CT  - improved LLL aeration  - s/p immunizations    ==================INFECTIOUS DISEASE================  Upper Extremity Rash  - Patient developed bilateral upper extremity rash after receiving Hepatitis A & B immunizations on   - Dermatology consulted         Patient requires continuous monitoring with bedside rhythm monitoring, pulse ox monitoring, and intermittent blood gas analysis. Care plan discussed with ICU care team. Patient remained critical and at risk for life threatening decompensation.  Patient seen, examined and plan discussed with CCU team during rounds.     I have personally provided 35 minutes of critical care time excluding time spent on separate procedures, in addition to initial critical care time provided by the CICU Attending, Dr. Lees.    By signing my name below, I, Rosalba Tapia, attest that this documentation has been prepared under the direction and in the presence of KARYN Choi   Electronically signed: Rosalba Phillips, 23 @ 20:30    I, Niurka Brizuela, personally performed the services described in this documentation. all medical record entries made by the scribe were at my direction and in my presence. I have reviewed the chart and agree that the record reflects my personal performance and is accurate and complete  Electronically signed: KARYN Choi

## 2023-12-05 NOTE — PROGRESS NOTE ADULT - SUBJECTIVE AND OBJECTIVE BOX
[facility] Division of Kidney Diseases & Hypertension  FOLLOW UP NOTE  [phone]--------------------------------------------------------------------------------    Chief Complaint: ARIC     24 hour events/subjective:  Patient seen at bedside in CICU. Has no acute complaints, remains on IABP. Patient denies SOB/CP, N/V, or diarrhea.     PAST HISTORY  --------------------------------------------------------------------------------  No significant changes to PMH, PSH, FHx, SHx, unless otherwise noted    ALLERGIES & MEDICATIONS  --------------------------------------------------------------------------------  Allergies    penicillins (Unknown)    Intolerances      Standing Inpatient Medications  aMIOdarone    Tablet   Oral   aMIOdarone    Tablet 200 milliGRAM(s) Oral daily  aspirin  chewable 81 milliGRAM(s) Oral daily  atorvastatin 80 milliGRAM(s) Oral at bedtime  budesonide  80 MICROgram(s)/formoterol 4.5 MICROgram(s) Inhaler 2 Puff(s) Inhalation two times a day  carvedilol 25 milliGRAM(s) Oral every 12 hours  chlorhexidine 2% Cloths 1 Application(s) Topical daily  heparin  Infusion 1300 Unit(s)/Hr IV Continuous <Continuous>  hydrALAZINE 100 milliGRAM(s) Oral three times a day  insulin glargine Injectable (LANTUS) 12 Unit(s) SubCutaneous at bedtime  insulin lispro (ADMELOG) corrective regimen sliding scale   SubCutaneous three times a day before meals  insulin lispro (ADMELOG) corrective regimen sliding scale   SubCutaneous at bedtime  insulin lispro Injectable (ADMELOG) 9 Unit(s) SubCutaneous three times a day before meals  isosorbide   dinitrate Tablet (ISORDIL) 40 milliGRAM(s) Oral three times a day  polyethylene glycol 3350 17 Gram(s) Oral two times a day  senna 2 Tablet(s) Oral at bedtime  vancomycin  IVPB 1500 milliGRAM(s) IV Intermittent once    PRN Inpatient Medications  hydrOXYzine hydrochloride 25 milliGRAM(s) Oral two times a day PRN      REVIEW OF SYSTEMS  --------------------------------------------------------------------------------  per above    VITALS/PHYSICAL EXAM  --------------------------------------------------------------------------------  T(C): 36.9 (12-05-23 @ 07:00), Max: 37.1 (12-04-23 @ 23:00)  HR: 82 (12-05-23 @ 10:00) (73 - 84)  BP: --  RR: 19 (12-05-23 @ 10:00) (15 - 25)  SpO2: 98% (12-05-23 @ 10:00) (97% - 100%)  Wt(kg): --        12-04-23 @ 07:01  -  12-05-23 @ 07:00  --------------------------------------------------------  IN: 1291 mL / OUT: 1450 mL / NET: -159 mL    12-05-23 @ 07:01  -  12-05-23 @ 10:36  --------------------------------------------------------  IN: 298 mL / OUT: 300 mL / NET: -2 mL      Physical Exam:  Gen: NAD, well-appearing  HEENT: PERRL, supple neck, clear oropharynx  Pulm: CTA B/L  CV: S1S2; IABP +  Back: No spinal or CVA tenderness  Abd: +BS, soft, nontender/nondistended  : No suprapubic tenderness   Extremities: no bilateral LE edema noted.   Neuro: No focal deficits  Skin: Interval improvement in the non- blanching rash. -mostly present in the b/l upper extremities.  Vascular access: NA    LABS/STUDIES  --------------------------------------------------------------------------------              12.0   7.66  >-----------<  132      [12-05-23 @ 02:41]              35.9     132  |  102  |  26  ----------------------------<  130      [12-05-23 @ 02:41]  4.4   |  19  |  1.30        Ca     9.4     [12-05-23 @ 02:41]      Mg     2.0     [12-05-23 @ 02:41]      Phos  3.3     [12-05-23 @ 02:41]    TPro  6.6  /  Alb  3.7  /  TBili  0.3  /  DBili  x   /  AST  36  /  ALT  101  /  AlkPhos  60  [12-05-23 @ 02:41]    PT/INR: PT 11.6 , INR 1.11       [12-05-23 @ 02:41]  PTT: 69.2       [12-05-23 @ 02:41]      Creatinine Trend:  SCr 1.30 [12-05 @ 02:41]  SCr 1.12 [12-04 @ 14:42]  SCr 1.42 [12-04 @ 00:39]  SCr 1.21 [12-03 @ 00:36]  SCr 1.37 [12-02 @ 00:22]    Urinalysis - [12-05-23 @ 02:41]      Color  / Appearance  / SG  / pH       Gluc 130 / Ketone   / Bili  / Urobili        Blood  / Protein  / Leuk Est  / Nitrite       RBC  / WBC  / Hyaline  / Gran  / Sq Epi  / Non Sq Epi  / Bacteria     Urine Creatinine 100      [11-29-23 @ 15:43]  Urine Sodium 21      [11-29-23 @ 15:43]  Urine Potassium 28      [11-29-23 @ 15:43]  Urine Osmolality 715      [11-29-23 @ 15:43]         [facility] Division of Kidney Diseases & Hypertension  FOLLOW UP NOTE  [phone]--------------------------------------------------------------------------------    Chief Complaint: ARIC     24 hour events/subjective:  Patient seen at bedside in CICU. Has no acute complaints, remains on IABP. Patient denies SOB/CP, N/V, or diarrhea.     PAST HISTORY  --------------------------------------------------------------------------------  No significant changes to PMH, PSH, FHx, SHx, unless otherwise noted    ALLERGIES & MEDICATIONS  --------------------------------------------------------------------------------  Allergies    penicillins (Unknown)    Intolerances      Standing Inpatient Medications  aMIOdarone    Tablet   Oral   aMIOdarone    Tablet 200 milliGRAM(s) Oral daily  aspirin  chewable 81 milliGRAM(s) Oral daily  atorvastatin 80 milliGRAM(s) Oral at bedtime  budesonide  80 MICROgram(s)/formoterol 4.5 MICROgram(s) Inhaler 2 Puff(s) Inhalation two times a day  carvedilol 25 milliGRAM(s) Oral every 12 hours  chlorhexidine 2% Cloths 1 Application(s) Topical daily  heparin  Infusion 1300 Unit(s)/Hr IV Continuous <Continuous>  hydrALAZINE 100 milliGRAM(s) Oral three times a day  insulin glargine Injectable (LANTUS) 12 Unit(s) SubCutaneous at bedtime  insulin lispro (ADMELOG) corrective regimen sliding scale   SubCutaneous three times a day before meals  insulin lispro (ADMELOG) corrective regimen sliding scale   SubCutaneous at bedtime  insulin lispro Injectable (ADMELOG) 9 Unit(s) SubCutaneous three times a day before meals  isosorbide   dinitrate Tablet (ISORDIL) 40 milliGRAM(s) Oral three times a day  polyethylene glycol 3350 17 Gram(s) Oral two times a day  senna 2 Tablet(s) Oral at bedtime  vancomycin  IVPB 1500 milliGRAM(s) IV Intermittent once    PRN Inpatient Medications  hydrOXYzine hydrochloride 25 milliGRAM(s) Oral two times a day PRN      REVIEW OF SYSTEMS  --------------------------------------------------------------------------------  per above    VITALS/PHYSICAL EXAM  --------------------------------------------------------------------------------  T(C): 36.9 (12-05-23 @ 07:00), Max: 37.1 (12-04-23 @ 23:00)  HR: 82 (12-05-23 @ 10:00) (73 - 84)  BP: --  RR: 19 (12-05-23 @ 10:00) (15 - 25)  SpO2: 98% (12-05-23 @ 10:00) (97% - 100%)  Wt(kg): --        12-04-23 @ 07:01  -  12-05-23 @ 07:00  --------------------------------------------------------  IN: 1291 mL / OUT: 1450 mL / NET: -159 mL    12-05-23 @ 07:01  -  12-05-23 @ 10:36  --------------------------------------------------------  IN: 298 mL / OUT: 300 mL / NET: -2 mL      Physical Exam:  Gen: NAD, well-appearing  HEENT: PERRL, supple neck, clear oropharynx  Pulm: CTA B/L  CV: S1S2; IABP +  Back: No spinal or CVA tenderness  Abd: +BS, soft, nontender/nondistended  : No suprapubic tenderness   Extremities: no bilateral LE edema noted.   Neuro: No focal deficits  Skin: Interval improvement in the non- blanching rash. -mostly present in the b/l upper extremities.  Vascular access: NA    LABS/STUDIES  --------------------------------------------------------------------------------              12.0   7.66  >-----------<  132      [12-05-23 @ 02:41]              35.9     132  |  102  |  26  ----------------------------<  130      [12-05-23 @ 02:41]  4.4   |  19  |  1.30        Ca     9.4     [12-05-23 @ 02:41]      Mg     2.0     [12-05-23 @ 02:41]      Phos  3.3     [12-05-23 @ 02:41]    TPro  6.6  /  Alb  3.7  /  TBili  0.3  /  DBili  x   /  AST  36  /  ALT  101  /  AlkPhos  60  [12-05-23 @ 02:41]    PT/INR: PT 11.6 , INR 1.11       [12-05-23 @ 02:41]  PTT: 69.2       [12-05-23 @ 02:41]      Creatinine Trend:  SCr 1.30 [12-05 @ 02:41]  SCr 1.12 [12-04 @ 14:42]  SCr 1.42 [12-04 @ 00:39]  SCr 1.21 [12-03 @ 00:36]  SCr 1.37 [12-02 @ 00:22]    Urinalysis - [12-05-23 @ 02:41]      Color  / Appearance  / SG  / pH       Gluc 130 / Ketone   / Bili  / Urobili        Blood  / Protein  / Leuk Est  / Nitrite       RBC  / WBC  / Hyaline  / Gran  / Sq Epi  / Non Sq Epi  / Bacteria     Urine Creatinine 100      [11-29-23 @ 15:43]  Urine Sodium 21      [11-29-23 @ 15:43]  Urine Potassium 28      [11-29-23 @ 15:43]  Urine Osmolality 715      [11-29-23 @ 15:43]

## 2023-12-05 NOTE — PROGRESS NOTE ADULT - SUBJECTIVE AND OBJECTIVE BOX
LD VALENCIA  58y Male  MRN:[id number]    Patient is a 58y old  Male who presents with a chief complaint of NSTEMI (01 Nov 2023 20:29)    HPI:  59yo M w/ hx HTN, CAD w/ 1 stent in 2009, ICH (2008) presenting with abn ekg. Patient presented to [facility] where he was found to have STEMI, recommended to get cath however patient did not want to get it there so it left and came here.  Patient initially had cough, congestion, fever, was placed on antibiotics on Sunday.  Started feeling nauseous and had a presyncopal event after which he presented to ED last night.  Had chest pain as well.  Chest pain is midsternal.  Not currently having chest pain.  Received 4 aspirin 30 min pta. (01 Nov 2023 15:11)      Patient seen and evaluated at bedside in CCU. interval events noted    Interval HPI: remain in ccu. IABP in place        PAST MEDICAL & SURGICAL HISTORY:  HTN (hypertension)      CAD (coronary artery disease)  2009; stent      Intracranial hemorrhage  2008      Respiratory arrest  december 1st      Myocardial infarction, unspecified MI type, unspecified artery      History of coronary artery stent placement          REVIEW OF SYSTEMS:  as per hpi     VITALS:   ICU Vital Signs Last 24 Hrs  T(C): 36.8 (05 Dec 2023 19:00), Max: 37.1 (04 Dec 2023 23:00)  T(F): 98.3 (05 Dec 2023 19:00), Max: 98.7 (04 Dec 2023 23:00)  HR: 81 (05 Dec 2023 20:00) (75 - 89)  BP: --  BP(mean): --  ABP: --  ABP(mean): --  RR: 21 (05 Dec 2023 20:00) (15 - 24)  SpO2: 99% (05 Dec 2023 20:00) (97% - 100%)    O2 Parameters below as of 05 Dec 2023 20:00  Patient On (Oxygen Delivery Method): room air            PHYSICAL EXAM:  GENERAL: NAD, comfortable   HEAD:  Atraumatic, Normocephalic  EYES: EOMI, PERRLA, conjunctiva and sclera clear  NECK: Supple, No JVD   CHEST/LUNG: Clear to auscultation bilaterally; No wheeze  HEART: Regular rate and rhythm; No murmurs, rubs, or gallops  ABDOMEN: Soft, Nontender, Nondistended; Bowel sounds present  EXTREMITIES:  2+ Peripheral Pulses, No clubbing, cyanosis, or edema  NEUROLOGY: nonfocal  SKIN: No rashes or lesions  +iabp    Consultant(s) Notes Reviewed:  [x ] YES  [ ] NO  Care Discussed with Consultants/Other Providers [ x] YES  [ ] NO    MEDS:   MEDICATIONS  (STANDING):  aspirin  chewable 81 milliGRAM(s) Oral daily  atorvastatin 80 milliGRAM(s) Oral at bedtime  budesonide  80 MICROgram(s)/formoterol 4.5 MICROgram(s) Inhaler 2 Puff(s) Inhalation two times a day  carvedilol 25 milliGRAM(s) Oral every 12 hours  chlorhexidine 2% Cloths 1 Application(s) Topical daily  heparin  Infusion 1300 Unit(s)/Hr (14 mL/Hr) IV Continuous <Continuous>  hydrALAZINE 75 milliGRAM(s) Oral three times a day  insulin glargine Injectable (LANTUS) 12 Unit(s) SubCutaneous at bedtime  insulin lispro (ADMELOG) corrective regimen sliding scale   SubCutaneous at bedtime  insulin lispro (ADMELOG) corrective regimen sliding scale   SubCutaneous three times a day before meals  insulin lispro Injectable (ADMELOG) 9 Unit(s) SubCutaneous three times a day before meals  isosorbide   dinitrate Tablet (ISORDIL) 30 milliGRAM(s) Oral three times a day  polyethylene glycol 3350 17 Gram(s) Oral two times a day  senna 2 Tablet(s) Oral at bedtime    MEDICATIONS  (PRN):  hydrOXYzine hydrochloride 25 milliGRAM(s) Oral two times a day PRN Anxiety        ALLERGIES:  penicillins (Unknown)      LABS:                                                        12.0   7.66  )-----------( 132      ( 05 Dec 2023 02:41 )             35.9   12-05    132<L>  |  102  |  26<H>  ----------------------------<  130<H>  4.4   |  19<L>  |  1.30    Ca    9.4      05 Dec 2023 02:41  Phos  3.3     12-05  Mg     2.0     12-05    TPro  6.6  /  Alb  3.7  /  TBili  0.3  /  DBili  x   /  AST  36  /  ALT  101<H>  /  AlkPhos  60  12-05        < from: CT Abdomen and Pelvis No Cont (11.28.23 @ 03:38) >  IMPRESSION:  Mildly dilated colon and prominent but not overly dilated small bowel   with air-fluid levels. No discrete transition point. Findings are   suggestive of ileus.    Intra-aortic balloon pump with the inferior marker at the level of the   inferior mesenteric artery and the balloon overlying the origins of the   renal arteries, celiac artery, and superior mesenteric artery. Consider   repositioning. Concern for IABP positioning was discussed with LANETTE Hawthorne   on 11/28/2023 at 3:42 AM by Dr. Shepard.    < end of copied text >          < from: Colonoscopy (11.17.23 @ 10:35) >                                                                                                        Impression:          - The entire examined colon is normal on direct and retroflexion views.                       - No specimens collected.  Recommendation:      - Resume previous diet today.                       - No large polyps or masses detected, No objection from GI standpoint to                 proceed with heart transplantation/advanced heart therapies.                       - Please call back should any further questions or concerns arise.    < end of copied text >     < from: Xray Chest 1 View- PORTABLE-Urgent (11.01.23 @ 07:42) >    IMPRESSION:  Clear lungs.    ---End of Report ---        < end of copied text >  < from: TTE W or WO Ultrasound Enhancing Agent (11.01.23 @ 10:23) >  _____________________________     CONCLUSIONS:      1. Left ventricular cavity is moderately dilated. Left ventricular wall thickness is normal. Left ventricular systolic function is severely decreased with an ejection fraction of 32 % by Chinchilla's method of disks. Regional wall motion abnormalities present.   2. Multiple segmental abnormalities exist. See findings.   3. There is moderate (grade 2) left ventricular diastolic dysfunction, with indeterminant filling pressure.   4. Normal right ventricular cavity size, wall thickness, and systolic function.   5. No significant valvular disease.   6. No pericardial effusion seen.   7. Compared to the transthoracic echocardiogram performed on 1/25/2017 the areas of akinesis are unchanged but there has been a decline in LV systolic function with new areas of hypokinesis.    __________________________________________________________________    < end of copied text >  < from: TTE Limited W or WO Ultrasound Enhancing Agent (11.02.23 @ 07:41) >  __________________________     CONCLUSIONS:      1. After obtaining consent, Definity ultrasound enhancing agent was given for enhanced left ventricular opacification and improved delineation of the left ventricular endocardial borders. Left ventricular systolic function is severely decreased with a calculated ejection fraction of 22 % by the Chinchilla's biplane method of disks. There is a left ventricular thrombus.   2. Findings were discussed with Litzy BOSS on 11/2/2023 at 8.49am.   3. There is a left ventricular thrombus.    _________________________________________________________________    < end of copied text >   LD VALENCIA  58y Male  MRN:[id number]    Patient is a 58y old  Male who presents with a chief complaint of NSTEMI (01 Nov 2023 20:29)    HPI:  57yo M w/ hx HTN, CAD w/ 1 stent in 2009, ICH (2008) presenting with abn ekg. Patient presented to [facility] where he was found to have STEMI, recommended to get cath however patient did not want to get it there so it left and came here.  Patient initially had cough, congestion, fever, was placed on antibiotics on Sunday.  Started feeling nauseous and had a presyncopal event after which he presented to ED last night.  Had chest pain as well.  Chest pain is midsternal.  Not currently having chest pain.  Received 4 aspirin 30 min pta. (01 Nov 2023 15:11)      Patient seen and evaluated at bedside in CCU. interval events noted    Interval HPI: remain in ccu. IABP in place        PAST MEDICAL & SURGICAL HISTORY:  HTN (hypertension)      CAD (coronary artery disease)  2009; stent      Intracranial hemorrhage  2008      Respiratory arrest  december 1st      Myocardial infarction, unspecified MI type, unspecified artery      History of coronary artery stent placement          REVIEW OF SYSTEMS:  as per hpi     VITALS:   ICU Vital Signs Last 24 Hrs  T(C): 36.8 (05 Dec 2023 19:00), Max: 37.1 (04 Dec 2023 23:00)  T(F): 98.3 (05 Dec 2023 19:00), Max: 98.7 (04 Dec 2023 23:00)  HR: 81 (05 Dec 2023 20:00) (75 - 89)  BP: --  BP(mean): --  ABP: --  ABP(mean): --  RR: 21 (05 Dec 2023 20:00) (15 - 24)  SpO2: 99% (05 Dec 2023 20:00) (97% - 100%)    O2 Parameters below as of 05 Dec 2023 20:00  Patient On (Oxygen Delivery Method): room air            PHYSICAL EXAM:  GENERAL: NAD, comfortable   HEAD:  Atraumatic, Normocephalic  EYES: EOMI, PERRLA, conjunctiva and sclera clear  NECK: Supple, No JVD   CHEST/LUNG: Clear to auscultation bilaterally; No wheeze  HEART: Regular rate and rhythm; No murmurs, rubs, or gallops  ABDOMEN: Soft, Nontender, Nondistended; Bowel sounds present  EXTREMITIES:  2+ Peripheral Pulses, No clubbing, cyanosis, or edema  NEUROLOGY: nonfocal  SKIN: No rashes or lesions  +iabp    Consultant(s) Notes Reviewed:  [x ] YES  [ ] NO  Care Discussed with Consultants/Other Providers [ x] YES  [ ] NO    MEDS:   MEDICATIONS  (STANDING):  aspirin  chewable 81 milliGRAM(s) Oral daily  atorvastatin 80 milliGRAM(s) Oral at bedtime  budesonide  80 MICROgram(s)/formoterol 4.5 MICROgram(s) Inhaler 2 Puff(s) Inhalation two times a day  carvedilol 25 milliGRAM(s) Oral every 12 hours  chlorhexidine 2% Cloths 1 Application(s) Topical daily  heparin  Infusion 1300 Unit(s)/Hr (14 mL/Hr) IV Continuous <Continuous>  hydrALAZINE 75 milliGRAM(s) Oral three times a day  insulin glargine Injectable (LANTUS) 12 Unit(s) SubCutaneous at bedtime  insulin lispro (ADMELOG) corrective regimen sliding scale   SubCutaneous at bedtime  insulin lispro (ADMELOG) corrective regimen sliding scale   SubCutaneous three times a day before meals  insulin lispro Injectable (ADMELOG) 9 Unit(s) SubCutaneous three times a day before meals  isosorbide   dinitrate Tablet (ISORDIL) 30 milliGRAM(s) Oral three times a day  polyethylene glycol 3350 17 Gram(s) Oral two times a day  senna 2 Tablet(s) Oral at bedtime    MEDICATIONS  (PRN):  hydrOXYzine hydrochloride 25 milliGRAM(s) Oral two times a day PRN Anxiety        ALLERGIES:  penicillins (Unknown)      LABS:                                                        12.0   7.66  )-----------( 132      ( 05 Dec 2023 02:41 )             35.9   12-05    132<L>  |  102  |  26<H>  ----------------------------<  130<H>  4.4   |  19<L>  |  1.30    Ca    9.4      05 Dec 2023 02:41  Phos  3.3     12-05  Mg     2.0     12-05    TPro  6.6  /  Alb  3.7  /  TBili  0.3  /  DBili  x   /  AST  36  /  ALT  101<H>  /  AlkPhos  60  12-05        < from: CT Abdomen and Pelvis No Cont (11.28.23 @ 03:38) >  IMPRESSION:  Mildly dilated colon and prominent but not overly dilated small bowel   with air-fluid levels. No discrete transition point. Findings are   suggestive of ileus.    Intra-aortic balloon pump with the inferior marker at the level of the   inferior mesenteric artery and the balloon overlying the origins of the   renal arteries, celiac artery, and superior mesenteric artery. Consider   repositioning. Concern for IABP positioning was discussed with LANETTE Hawthorne   on 11/28/2023 at 3:42 AM by Dr. Shepard.    < end of copied text >          < from: Colonoscopy (11.17.23 @ 10:35) >                                                                                                        Impression:          - The entire examined colon is normal on direct and retroflexion views.                       - No specimens collected.  Recommendation:      - Resume previous diet today.                       - No large polyps or masses detected, No objection from GI standpoint to                 proceed with heart transplantation/advanced heart therapies.                       - Please call back should any further questions or concerns arise.    < end of copied text >     < from: Xray Chest 1 View- PORTABLE-Urgent (11.01.23 @ 07:42) >    IMPRESSION:  Clear lungs.    ---End of Report ---        < end of copied text >  < from: TTE W or WO Ultrasound Enhancing Agent (11.01.23 @ 10:23) >  _____________________________     CONCLUSIONS:      1. Left ventricular cavity is moderately dilated. Left ventricular wall thickness is normal. Left ventricular systolic function is severely decreased with an ejection fraction of 32 % by Chinchilla's method of disks. Regional wall motion abnormalities present.   2. Multiple segmental abnormalities exist. See findings.   3. There is moderate (grade 2) left ventricular diastolic dysfunction, with indeterminant filling pressure.   4. Normal right ventricular cavity size, wall thickness, and systolic function.   5. No significant valvular disease.   6. No pericardial effusion seen.   7. Compared to the transthoracic echocardiogram performed on 1/25/2017 the areas of akinesis are unchanged but there has been a decline in LV systolic function with new areas of hypokinesis.    __________________________________________________________________    < end of copied text >  < from: TTE Limited W or WO Ultrasound Enhancing Agent (11.02.23 @ 07:41) >  __________________________     CONCLUSIONS:      1. After obtaining consent, Definity ultrasound enhancing agent was given for enhanced left ventricular opacification and improved delineation of the left ventricular endocardial borders. Left ventricular systolic function is severely decreased with a calculated ejection fraction of 22 % by the Chinchilla's biplane method of disks. There is a left ventricular thrombus.   2. Findings were discussed with Litzy BOSS on 11/2/2023 at 8.49am.   3. There is a left ventricular thrombus.    _________________________________________________________________    < end of copied text >

## 2023-12-06 DIAGNOSIS — R74.01 ELEVATION OF LEVELS OF LIVER TRANSAMINASE LEVELS: ICD-10-CM

## 2023-12-06 LAB
ALBUMIN SERPL ELPH-MCNC: 3.9 G/DL — SIGNIFICANT CHANGE UP (ref 3.3–5)
ALBUMIN SERPL ELPH-MCNC: 3.9 G/DL — SIGNIFICANT CHANGE UP (ref 3.3–5)
ALP SERPL-CCNC: 61 U/L — SIGNIFICANT CHANGE UP (ref 40–120)
ALP SERPL-CCNC: 61 U/L — SIGNIFICANT CHANGE UP (ref 40–120)
ALT FLD-CCNC: 109 U/L — HIGH (ref 10–45)
ALT FLD-CCNC: 109 U/L — HIGH (ref 10–45)
ANION GAP SERPL CALC-SCNC: 10 MMOL/L — SIGNIFICANT CHANGE UP (ref 5–17)
ANION GAP SERPL CALC-SCNC: 10 MMOL/L — SIGNIFICANT CHANGE UP (ref 5–17)
APTT BLD: 67.1 SEC — HIGH (ref 24.5–35.6)
APTT BLD: 67.1 SEC — HIGH (ref 24.5–35.6)
AST SERPL-CCNC: 43 U/L — HIGH (ref 10–40)
AST SERPL-CCNC: 43 U/L — HIGH (ref 10–40)
BASOPHILS # BLD AUTO: 0.03 K/UL — SIGNIFICANT CHANGE UP (ref 0–0.2)
BASOPHILS # BLD AUTO: 0.03 K/UL — SIGNIFICANT CHANGE UP (ref 0–0.2)
BASOPHILS NFR BLD AUTO: 0.4 % — SIGNIFICANT CHANGE UP (ref 0–2)
BASOPHILS NFR BLD AUTO: 0.4 % — SIGNIFICANT CHANGE UP (ref 0–2)
BILIRUB SERPL-MCNC: 0.3 MG/DL — SIGNIFICANT CHANGE UP (ref 0.2–1.2)
BILIRUB SERPL-MCNC: 0.3 MG/DL — SIGNIFICANT CHANGE UP (ref 0.2–1.2)
BUN SERPL-MCNC: 31 MG/DL — HIGH (ref 7–23)
BUN SERPL-MCNC: 31 MG/DL — HIGH (ref 7–23)
CALCIUM SERPL-MCNC: 10.4 MG/DL — SIGNIFICANT CHANGE UP (ref 8.4–10.5)
CALCIUM SERPL-MCNC: 10.4 MG/DL — SIGNIFICANT CHANGE UP (ref 8.4–10.5)
CHLORIDE SERPL-SCNC: 100 MMOL/L — SIGNIFICANT CHANGE UP (ref 96–108)
CHLORIDE SERPL-SCNC: 100 MMOL/L — SIGNIFICANT CHANGE UP (ref 96–108)
CO2 SERPL-SCNC: 22 MMOL/L — SIGNIFICANT CHANGE UP (ref 22–31)
CO2 SERPL-SCNC: 22 MMOL/L — SIGNIFICANT CHANGE UP (ref 22–31)
CREAT SERPL-MCNC: 1.41 MG/DL — HIGH (ref 0.5–1.3)
CREAT SERPL-MCNC: 1.41 MG/DL — HIGH (ref 0.5–1.3)
EGFR: 57 ML/MIN/1.73M2 — LOW
EGFR: 57 ML/MIN/1.73M2 — LOW
EOSINOPHIL # BLD AUTO: 0.47 K/UL — SIGNIFICANT CHANGE UP (ref 0–0.5)
EOSINOPHIL # BLD AUTO: 0.47 K/UL — SIGNIFICANT CHANGE UP (ref 0–0.5)
EOSINOPHIL NFR BLD AUTO: 6 % — SIGNIFICANT CHANGE UP (ref 0–6)
EOSINOPHIL NFR BLD AUTO: 6 % — SIGNIFICANT CHANGE UP (ref 0–6)
GLUCOSE BLDC GLUCOMTR-MCNC: 118 MG/DL — HIGH (ref 70–99)
GLUCOSE BLDC GLUCOMTR-MCNC: 118 MG/DL — HIGH (ref 70–99)
GLUCOSE BLDC GLUCOMTR-MCNC: 127 MG/DL — HIGH (ref 70–99)
GLUCOSE BLDC GLUCOMTR-MCNC: 127 MG/DL — HIGH (ref 70–99)
GLUCOSE BLDC GLUCOMTR-MCNC: 139 MG/DL — HIGH (ref 70–99)
GLUCOSE BLDC GLUCOMTR-MCNC: 139 MG/DL — HIGH (ref 70–99)
GLUCOSE BLDC GLUCOMTR-MCNC: 241 MG/DL — HIGH (ref 70–99)
GLUCOSE BLDC GLUCOMTR-MCNC: 241 MG/DL — HIGH (ref 70–99)
GLUCOSE SERPL-MCNC: 119 MG/DL — HIGH (ref 70–99)
GLUCOSE SERPL-MCNC: 119 MG/DL — HIGH (ref 70–99)
HCT VFR BLD CALC: 36.1 % — LOW (ref 39–50)
HCT VFR BLD CALC: 36.1 % — LOW (ref 39–50)
HGB BLD-MCNC: 12.1 G/DL — LOW (ref 13–17)
HGB BLD-MCNC: 12.1 G/DL — LOW (ref 13–17)
IMM GRANULOCYTES NFR BLD AUTO: 0.5 % — SIGNIFICANT CHANGE UP (ref 0–0.9)
IMM GRANULOCYTES NFR BLD AUTO: 0.5 % — SIGNIFICANT CHANGE UP (ref 0–0.9)
INR BLD: 1.13 RATIO — SIGNIFICANT CHANGE UP (ref 0.85–1.18)
INR BLD: 1.13 RATIO — SIGNIFICANT CHANGE UP (ref 0.85–1.18)
LACTATE SERPL-SCNC: 1.2 MMOL/L — SIGNIFICANT CHANGE UP (ref 0.5–2)
LACTATE SERPL-SCNC: 1.2 MMOL/L — SIGNIFICANT CHANGE UP (ref 0.5–2)
LYMPHOCYTES # BLD AUTO: 0.98 K/UL — LOW (ref 1–3.3)
LYMPHOCYTES # BLD AUTO: 0.98 K/UL — LOW (ref 1–3.3)
LYMPHOCYTES # BLD AUTO: 12.5 % — LOW (ref 13–44)
LYMPHOCYTES # BLD AUTO: 12.5 % — LOW (ref 13–44)
MAGNESIUM SERPL-MCNC: 1.8 MG/DL — SIGNIFICANT CHANGE UP (ref 1.6–2.6)
MAGNESIUM SERPL-MCNC: 1.8 MG/DL — SIGNIFICANT CHANGE UP (ref 1.6–2.6)
MCHC RBC-ENTMCNC: 30.1 PG — SIGNIFICANT CHANGE UP (ref 27–34)
MCHC RBC-ENTMCNC: 30.1 PG — SIGNIFICANT CHANGE UP (ref 27–34)
MCHC RBC-ENTMCNC: 33.5 GM/DL — SIGNIFICANT CHANGE UP (ref 32–36)
MCHC RBC-ENTMCNC: 33.5 GM/DL — SIGNIFICANT CHANGE UP (ref 32–36)
MCV RBC AUTO: 89.8 FL — SIGNIFICANT CHANGE UP (ref 80–100)
MCV RBC AUTO: 89.8 FL — SIGNIFICANT CHANGE UP (ref 80–100)
MONOCYTES # BLD AUTO: 0.58 K/UL — SIGNIFICANT CHANGE UP (ref 0–0.9)
MONOCYTES # BLD AUTO: 0.58 K/UL — SIGNIFICANT CHANGE UP (ref 0–0.9)
MONOCYTES NFR BLD AUTO: 7.4 % — SIGNIFICANT CHANGE UP (ref 2–14)
MONOCYTES NFR BLD AUTO: 7.4 % — SIGNIFICANT CHANGE UP (ref 2–14)
NEUTROPHILS # BLD AUTO: 5.76 K/UL — SIGNIFICANT CHANGE UP (ref 1.8–7.4)
NEUTROPHILS # BLD AUTO: 5.76 K/UL — SIGNIFICANT CHANGE UP (ref 1.8–7.4)
NEUTROPHILS NFR BLD AUTO: 73.2 % — SIGNIFICANT CHANGE UP (ref 43–77)
NEUTROPHILS NFR BLD AUTO: 73.2 % — SIGNIFICANT CHANGE UP (ref 43–77)
NRBC # BLD: 0 /100 WBCS — SIGNIFICANT CHANGE UP (ref 0–0)
NRBC # BLD: 0 /100 WBCS — SIGNIFICANT CHANGE UP (ref 0–0)
PHOSPHATE SERPL-MCNC: 4.1 MG/DL — SIGNIFICANT CHANGE UP (ref 2.5–4.5)
PHOSPHATE SERPL-MCNC: 4.1 MG/DL — SIGNIFICANT CHANGE UP (ref 2.5–4.5)
PLATELET # BLD AUTO: 135 K/UL — LOW (ref 150–400)
PLATELET # BLD AUTO: 135 K/UL — LOW (ref 150–400)
POTASSIUM SERPL-MCNC: 4.8 MMOL/L — SIGNIFICANT CHANGE UP (ref 3.5–5.3)
POTASSIUM SERPL-MCNC: 4.8 MMOL/L — SIGNIFICANT CHANGE UP (ref 3.5–5.3)
POTASSIUM SERPL-SCNC: 4.8 MMOL/L — SIGNIFICANT CHANGE UP (ref 3.5–5.3)
POTASSIUM SERPL-SCNC: 4.8 MMOL/L — SIGNIFICANT CHANGE UP (ref 3.5–5.3)
PROT SERPL-MCNC: 6.9 G/DL — SIGNIFICANT CHANGE UP (ref 6–8.3)
PROT SERPL-MCNC: 6.9 G/DL — SIGNIFICANT CHANGE UP (ref 6–8.3)
PROTHROM AB SERPL-ACNC: 12.4 SEC — SIGNIFICANT CHANGE UP (ref 9.5–13)
PROTHROM AB SERPL-ACNC: 12.4 SEC — SIGNIFICANT CHANGE UP (ref 9.5–13)
RBC # BLD: 4.02 M/UL — LOW (ref 4.2–5.8)
RBC # BLD: 4.02 M/UL — LOW (ref 4.2–5.8)
RBC # FLD: 15.3 % — HIGH (ref 10.3–14.5)
RBC # FLD: 15.3 % — HIGH (ref 10.3–14.5)
SODIUM SERPL-SCNC: 132 MMOL/L — LOW (ref 135–145)
SODIUM SERPL-SCNC: 132 MMOL/L — LOW (ref 135–145)
UNFRACTIONATED HEPARIN INTERPRETATION: SIGNIFICANT CHANGE UP
UNFRACTIONATED HEPARIN INTERPRETATION: SIGNIFICANT CHANGE UP
UNFRACTIONATED HEPARIN RESULT: NEGATIVE — SIGNIFICANT CHANGE UP
UNFRACTIONATED HEPARIN RESULT: NEGATIVE — SIGNIFICANT CHANGE UP
UNFRACTIONATED HEPARIN-HIGH DOSE: 1 % — SIGNIFICANT CHANGE UP
UNFRACTIONATED HEPARIN-HIGH DOSE: 1 % — SIGNIFICANT CHANGE UP
UNFRACTIONATED HEPARIN-LOW DOSE: 0 % — SIGNIFICANT CHANGE UP
UNFRACTIONATED HEPARIN-LOW DOSE: 0 % — SIGNIFICANT CHANGE UP
VANCOMYCIN FLD-MCNC: 13.1 UG/ML — SIGNIFICANT CHANGE UP
VANCOMYCIN FLD-MCNC: 13.1 UG/ML — SIGNIFICANT CHANGE UP
WBC # BLD: 7.86 K/UL — SIGNIFICANT CHANGE UP (ref 3.8–10.5)
WBC # BLD: 7.86 K/UL — SIGNIFICANT CHANGE UP (ref 3.8–10.5)
WBC # FLD AUTO: 7.86 K/UL — SIGNIFICANT CHANGE UP (ref 3.8–10.5)
WBC # FLD AUTO: 7.86 K/UL — SIGNIFICANT CHANGE UP (ref 3.8–10.5)

## 2023-12-06 PROCEDURE — 99291 CRITICAL CARE FIRST HOUR: CPT | Mod: GC,25

## 2023-12-06 PROCEDURE — 99291 CRITICAL CARE FIRST HOUR: CPT

## 2023-12-06 PROCEDURE — 99233 SBSQ HOSP IP/OBS HIGH 50: CPT

## 2023-12-06 PROCEDURE — 99292 CRITICAL CARE ADDL 30 MIN: CPT

## 2023-12-06 PROCEDURE — 71045 X-RAY EXAM CHEST 1 VIEW: CPT | Mod: 26

## 2023-12-06 PROCEDURE — 93010 ELECTROCARDIOGRAM REPORT: CPT

## 2023-12-06 RX ORDER — VANCOMYCIN HCL 1 G
1500 VIAL (EA) INTRAVENOUS ONCE
Refills: 0 | Status: DISCONTINUED | OUTPATIENT
Start: 2023-12-06 | End: 2023-12-06

## 2023-12-06 RX ORDER — MAGNESIUM SULFATE 500 MG/ML
2 VIAL (ML) INJECTION ONCE
Refills: 0 | Status: COMPLETED | OUTPATIENT
Start: 2023-12-06 | End: 2023-12-06

## 2023-12-06 RX ADMIN — Medication 0: at 21:24

## 2023-12-06 RX ADMIN — Medication 75 MILLIGRAM(S): at 21:20

## 2023-12-06 RX ADMIN — ISOSORBIDE DINITRATE 30 MILLIGRAM(S): 5 TABLET ORAL at 12:19

## 2023-12-06 RX ADMIN — Medication 2: at 12:20

## 2023-12-06 RX ADMIN — CARVEDILOL PHOSPHATE 25 MILLIGRAM(S): 80 CAPSULE, EXTENDED RELEASE ORAL at 05:35

## 2023-12-06 RX ADMIN — Medication 0: at 08:25

## 2023-12-06 RX ADMIN — ISOSORBIDE DINITRATE 30 MILLIGRAM(S): 5 TABLET ORAL at 05:35

## 2023-12-06 RX ADMIN — INSULIN GLARGINE 12 UNIT(S): 100 INJECTION, SOLUTION SUBCUTANEOUS at 21:24

## 2023-12-06 RX ADMIN — ISOSORBIDE DINITRATE 30 MILLIGRAM(S): 5 TABLET ORAL at 17:45

## 2023-12-06 RX ADMIN — Medication 9 UNIT(S): at 17:46

## 2023-12-06 RX ADMIN — ATORVASTATIN CALCIUM 80 MILLIGRAM(S): 80 TABLET, FILM COATED ORAL at 21:20

## 2023-12-06 RX ADMIN — Medication 9 UNIT(S): at 08:25

## 2023-12-06 RX ADMIN — Medication 75 MILLIGRAM(S): at 08:26

## 2023-12-06 RX ADMIN — Medication 0: at 17:46

## 2023-12-06 RX ADMIN — CHLORHEXIDINE GLUCONATE 1 APPLICATION(S): 213 SOLUTION TOPICAL at 21:30

## 2023-12-06 RX ADMIN — Medication 81 MILLIGRAM(S): at 12:19

## 2023-12-06 RX ADMIN — Medication 9 UNIT(S): at 12:20

## 2023-12-06 RX ADMIN — Medication 25 GRAM(S): at 02:30

## 2023-12-06 RX ADMIN — BUDESONIDE AND FORMOTEROL FUMARATE DIHYDRATE 2 PUFF(S): 160; 4.5 AEROSOL RESPIRATORY (INHALATION) at 08:50

## 2023-12-06 RX ADMIN — Medication 75 MILLIGRAM(S): at 15:04

## 2023-12-06 RX ADMIN — BUDESONIDE AND FORMOTEROL FUMARATE DIHYDRATE 2 PUFF(S): 160; 4.5 AEROSOL RESPIRATORY (INHALATION) at 21:18

## 2023-12-06 RX ADMIN — CARVEDILOL PHOSPHATE 25 MILLIGRAM(S): 80 CAPSULE, EXTENDED RELEASE ORAL at 17:45

## 2023-12-06 NOTE — PROGRESS NOTE ADULT - SUBJECTIVE AND OBJECTIVE BOX
INTERVAL HPI/OVERNIGHT EVENTS:    Notes no major changes in rash. Has not been applying any topicals, amenable to trying clobetasol.  ________________________________    REVIEW OF SYSTEMS      General: no fevers/chills, no NS	    Skin: see HPI  	  Ophthalmologic: no eye pain or change in vision    Genitourinary: no dysuria or hematuria    Musculoskeletal: no joint pains or weakness	    Neurological:no weakness or tingling        ROS negative except if noted in the above subjective text. All other systems reviewed and negative.    MEDICATIONS  (STANDING):  aspirin  chewable 81 milliGRAM(s) Oral daily  atorvastatin 80 milliGRAM(s) Oral at bedtime  budesonide  80 MICROgram(s)/formoterol 4.5 MICROgram(s) Inhaler 2 Puff(s) Inhalation two times a day  carvedilol 25 milliGRAM(s) Oral every 12 hours  chlorhexidine 2% Cloths 1 Application(s) Topical daily  heparin  Infusion 1300 Unit(s)/Hr (14 mL/Hr) IV Continuous <Continuous>  hydrALAZINE 75 milliGRAM(s) Oral three times a day  insulin glargine Injectable (LANTUS) 12 Unit(s) SubCutaneous at bedtime  insulin lispro (ADMELOG) corrective regimen sliding scale   SubCutaneous at bedtime  insulin lispro (ADMELOG) corrective regimen sliding scale   SubCutaneous three times a day before meals  insulin lispro Injectable (ADMELOG) 9 Unit(s) SubCutaneous three times a day before meals  isosorbide   dinitrate Tablet (ISORDIL) 30 milliGRAM(s) Oral three times a day  polyethylene glycol 3350 17 Gram(s) Oral two times a day  senna 2 Tablet(s) Oral at bedtime    MEDICATIONS  (PRN):  hydrOXYzine hydrochloride 25 milliGRAM(s) Oral two times a day PRN Anxiety      Allergies    penicillins (Unknown)    Intolerances          O: ICU Vital Signs Last 24 Hrs  T(C): 36.7 (07 Dec 2023 03:00), Max: 36.9 (06 Dec 2023 14:00)  T(F): 98.1 (07 Dec 2023 03:00), Max: 98.4 (06 Dec 2023 14:00)  HR: 80 (07 Dec 2023 07:00) (72 - 86)  BP: --  BP(mean): --  ABP: --  ABP(mean): --  RR: 21 (07 Dec 2023 07:00) (15 - 35)  SpO2: 99% (07 Dec 2023 06:00) (97% - 100%)    O2 Parameters below as of 07 Dec 2023 06:00  Patient On (Oxygen Delivery Method): room air          I&O's Detail    06 Dec 2023 07:01  -  07 Dec 2023 07:00  --------------------------------------------------------  IN:    Heparin: 294 mL    Oral Fluid: 770 mL  Total IN: 1064 mL    OUT:    Voided (mL): 2450 mL  Total OUT: 2450 mL    Total NET: -1386 mL          ___________________________________  PHYSICAL EXAM:     The patient was alert and oriented X 3, well nourished, and in no  apparent distress.  OP showed no ulcerations  There was no visible lymphadenopathy.  Conjunctiva were non injected  There was no clubbing or edema of extremities.  The scalp, hair, face, eyebrows, lips, OP, neck, chest, back,   extremities X 4, nails were examined.  There was no hyperhidrosis or bromhidrosis.    Of note on skin exam:   pink macules and papules coalescing into patches and plaques on b/l arms, neck, chest, and legs--more involvement today compared to yesterday, also with erythema of face and ears  ____________________________________      Labs:                       12.0   8.18  )-----------( 140      ( 07 Dec 2023 00:58 )             36.9     CBC Full  -  ( 07 Dec 2023 00:58 )  WBC Count : 8.18 K/uL  RBC Count : 4.10 M/uL  Hemoglobin : 12.0 g/dL  Hematocrit : 36.9 %  Platelet Count - Automated : 140 K/uL  Mean Cell Volume : 90.0 fl  Mean Cell Hemoglobin : 29.3 pg  Mean Cell Hemoglobin Concentration : 32.5 gm/dL  Auto Neutrophil # : 5.55 K/uL  Auto Lymphocyte # : 1.34 K/uL  Auto Monocyte # : 0.66 K/uL  Auto Eosinophil # : 0.55 K/uL  Auto Basophil # : 0.04 K/uL  Auto Neutrophil % : 67.8 %  Auto Lymphocyte % : 16.4 %  Auto Monocyte % : 8.1 %  Auto Eosinophil % : 6.7 %  Auto Basophil % : 0.5 %    12-07    135  |  103  |  35<H>  ----------------------------<  149<H>  4.9   |  21<L>  |  1.35<H>    Ca    10.4      07 Dec 2023 00:58  Phos  3.5     12-07  Mg     2.0     12-07    TPro  7.0  /  Alb  3.9  /  TBili  0.3  /  DBili  x   /  AST  42<H>  /  ALT  117<H>  /  AlkPhos  63  12-07    PT/INR - ( 07 Dec 2023 00:58 )   PT: 11.4 sec;   INR: 1.09 ratio         PTT - ( 07 Dec 2023 00:58 )  PTT:67.3 sec  Urinalysis Basic - ( 07 Dec 2023 00:58 )    Color: x / Appearance: x / SG: x / pH: x  Gluc: 149 mg/dL / Ketone: x  / Bili: x / Urobili: x   Blood: x / Protein: x / Nitrite: x   Leuk Esterase: x / RBC: x / WBC x   Sq Epi: x / Non Sq Epi: x / Bacteria: x      CAPILLARY BLOOD GLUCOSE      POCT Blood Glucose.: 139 mg/dL (06 Dec 2023 21:23)

## 2023-12-06 NOTE — PROGRESS NOTE ADULT - SUBJECTIVE AND OBJECTIVE BOX
Follow Up:  pre-OHT    Interval History: some improvement in rash. afebrile.     REVIEW OF SYSTEMS  [  ] ROS unobtainable because:    [x  ] All other systems negative except as noted below    Constitutional:  [ ] fever [ ] chills  [ ] weight loss  [ ] weakness  Skin:  [ x] rash [ ] phlebitis	  Eyes: [ ] icterus [ ] pain  [ ] discharge	  ENMT: [ ] sore throat  [ ] thrush [ ] ulcers [ ] exudates  Respiratory: [ ] dyspnea [ ] hemoptysis [ ] cough [ ] sputum	  Cardiovascular:  [ ] chest pain [ ] palpitations [ ] edema	  Gastrointestinal:  [ ] nausea [ ] vomiting [ ] diarrhea [ ] constipation [ ] pain	  Genitourinary:  [ ] dysuria [ ] frequency [ ] hematuria [ ] discharge [ ] flank pain  [ ] incontinence  Musculoskeletal:  [ ] myalgias [ ] arthralgias [ ] arthritis  [ ] back pain  Neurological:  [ ] headache [ ] seizures  [ ] confusion/altered mental status    Allergies  penicillins (Unknown)        ANTIMICROBIALS:      OTHER MEDS:  MEDICATIONS  (STANDING):  aspirin  chewable 81 daily  atorvastatin 80 at bedtime  budesonide  80 MICROgram(s)/formoterol 4.5 MICROgram(s) Inhaler 2 two times a day  carvedilol 25 every 12 hours  heparin  Infusion 1300 <Continuous>  hydrALAZINE 75 three times a day  hydrOXYzine hydrochloride 25 two times a day PRN  insulin glargine Injectable (LANTUS) 12 at bedtime  insulin lispro (ADMELOG) corrective regimen sliding scale  three times a day before meals  insulin lispro (ADMELOG) corrective regimen sliding scale  at bedtime  insulin lispro Injectable (ADMELOG) 9 three times a day before meals  isosorbide   dinitrate Tablet (ISORDIL) 30 three times a day  polyethylene glycol 3350 17 two times a day  senna 2 at bedtime      Vital Signs Last 24 Hrs  T(C): 36.7 (06 Dec 2023 08:00), Max: 36.9 (05 Dec 2023 23:00)  T(F): 98.1 (06 Dec 2023 08:00), Max: 98.5 (05 Dec 2023 23:00)  HR: 80 (06 Dec 2023 16:00) (71 - 85)  BP: --  BP(mean): --  RR: 50 (06 Dec 2023 16:00) (15 - 50)  SpO2: 100% (06 Dec 2023 16:00) (97% - 100%)    Parameters below as of 06 Dec 2023 15:00  Patient On (Oxygen Delivery Method): room air      PHYSICAL EXAMINATION:  General: Alert and Awake, NAD  Cardiac: RRR, No M/R/G  Resp: CTAB, No Wh/Rh/Ra  Abdomen: NBS, NT/ND, No HSM, No rigidity or guarding  MSK: No LE edema. No Calf tenderness  Vasc: R Balloon Pump (No surrounding erythema, drainage or tenderness to palpation)  Skin: Maculopapular rash on UE, UE and to lesser degree the chest. Skin is warm and dry to the touch.   Neuro: Alert and Awake. CN 2-12 Grossly intact. Moves all four extremities spontaneously.  Psych: Calm, Pleasant, Cooperative                          12.1   7.86  )-----------( 135      ( 06 Dec 2023 01:49 )             36.1       12-06    132<L>  |  100  |  31<H>  ----------------------------<  119<H>  4.8   |  22  |  1.41<H>    Ca    10.4      06 Dec 2023 01:49  Phos  4.1     12-06  Mg     1.8     12-06    TPro  6.9  /  Alb  3.9  /  TBili  0.3  /  DBili  x   /  AST  43<H>  /  ALT  109<H>  /  AlkPhos  61  12-06      Urinalysis Basic - ( 06 Dec 2023 01:49 )    Color: x / Appearance: x / SG: x / pH: x  Gluc: 119 mg/dL / Ketone: x  / Bili: x / Urobili: x   Blood: x / Protein: x / Nitrite: x   Leuk Esterase: x / RBC: x / WBC x   Sq Epi: x / Non Sq Epi: x / Bacteria: x        MICROBIOLOGY:  Vancomycin Level, Random: 13.1 ug/mL (12-06-23 @ 02:49)  v  .Blood Blood-Peripheral  12-04-23   No growth at 24 hours  --  --      .Blood Blood-Peripheral  12-04-23   No growth at 24 hours  --  --      .Blood Blood  11-30-23   Growth in anaerobic bottle: Cutibacterium acnes  Susceptibility to follow.  Direct identification is available within approximately 3-5  hours either by Blood Panel Multiplexed PCR or Direct  MALDI-TOF. Details: [email or web address]  --  Blood Culture PCR      .Blood Blood-Venous  11-29-23   No growth at 5 days  --  --      .Blood Blood-Peripheral  11-18-23   No growth at 5 days  --  --      .Blood Blood-Peripheral  11-18-23   No growth at 5 days  --  --      Clean Catch Clean Catch (Midstream)  11-18-23   10,000 - 49,000 CFU/mL Enterococcus faecalis  <10,000 CFU/ml Normal Urogenital kaitlynn present  --  Enterococcus faecalis      .Blood Blood  11-17-23   Growth in aerobic and anaerobic bottles: Enterococcus faecalis  See previous culture [id number]  Growth in aerobic bottle: Staphylococcus epidermidis  --  Staphylococcus epidermidis      .Blood Blood  11-17-23   Growth in aerobic bottle: Enterococcus faecalis  Growth in anaerobic bottle: Staphylococcus epidermidis "Susceptibilities  not performed"  Direct identification is available within approximately 3-5  hours either by Blood Panel Multiplexed PCR or Direct  MALDI-TOF. Details: [email or web address]  --  Blood Culture PCR  Blood Culture PCR  Enterococcus faecalis      .Blood Blood-Peripheral  11-10-23   No growth at 5 days  --  --        CMV IgG Antibody: 4.20 U/mL (11-10-23 @ 17:29)  Toxoplasma IgG Screen: <3.0 IU/mL (11-10-23 @ 17:29)  HIV-1 RNA Quantitative, Viral Load Log: NOT DET. lg /mL (11-10-23 @ 17:06)    Rapid RVP Result: NotDetec (12-05 @ 20:42)        RADIOLOGY:    <The imaging below has been reviewed and visualized by me independently. Findings as detailed in report below>    < from: Xray Chest 1 View- PORTABLE-Routine (Xray Chest 1 View- PORTABLE-Routine in AM.) (12.06.23 @ 04:03) >  IMPRESSION:  No active pulmonary disease.    < end of copied text >   Follow Up:  pre-OHT    Interval History: some improvement in rash. afebrile.     REVIEW OF SYSTEMS  [  ] ROS unobtainable because:    [x  ] All other systems negative except as noted below    Constitutional:  [ ] fever [ ] chills  [ ] weight loss  [ ] weakness  Skin:  [ x] rash [ ] phlebitis	  Eyes: [ ] icterus [ ] pain  [ ] discharge	  ENMT: [ ] sore throat  [ ] thrush [ ] ulcers [ ] exudates  Respiratory: [ ] dyspnea [ ] hemoptysis [ ] cough [ ] sputum	  Cardiovascular:  [ ] chest pain [ ] palpitations [ ] edema	  Gastrointestinal:  [ ] nausea [ ] vomiting [ ] diarrhea [ ] constipation [ ] pain	  Genitourinary:  [ ] dysuria [ ] frequency [ ] hematuria [ ] discharge [ ] flank pain  [ ] incontinence  Musculoskeletal:  [ ] myalgias [ ] arthralgias [ ] arthritis  [ ] back pain  Neurological:  [ ] headache [ ] seizures  [ ] confusion/altered mental status    Allergies  penicillins (Unknown)        ANTIMICROBIALS:      OTHER MEDS:  MEDICATIONS  (STANDING):  aspirin  chewable 81 daily  atorvastatin 80 at bedtime  budesonide  80 MICROgram(s)/formoterol 4.5 MICROgram(s) Inhaler 2 two times a day  carvedilol 25 every 12 hours  heparin  Infusion 1300 <Continuous>  hydrALAZINE 75 three times a day  hydrOXYzine hydrochloride 25 two times a day PRN  insulin glargine Injectable (LANTUS) 12 at bedtime  insulin lispro (ADMELOG) corrective regimen sliding scale  three times a day before meals  insulin lispro (ADMELOG) corrective regimen sliding scale  at bedtime  insulin lispro Injectable (ADMELOG) 9 three times a day before meals  isosorbide   dinitrate Tablet (ISORDIL) 30 three times a day  polyethylene glycol 3350 17 two times a day  senna 2 at bedtime      Vital Signs Last 24 Hrs  T(C): 36.7 (06 Dec 2023 08:00), Max: 36.9 (05 Dec 2023 23:00)  T(F): 98.1 (06 Dec 2023 08:00), Max: 98.5 (05 Dec 2023 23:00)  HR: 80 (06 Dec 2023 16:00) (71 - 85)  BP: --  BP(mean): --  RR: 50 (06 Dec 2023 16:00) (15 - 50)  SpO2: 100% (06 Dec 2023 16:00) (97% - 100%)    Parameters below as of 06 Dec 2023 15:00  Patient On (Oxygen Delivery Method): room air      PHYSICAL EXAMINATION:  General: Alert and Awake, NAD  Cardiac: RRR, No M/R/G  Resp: CTAB, No Wh/Rh/Ra  Abdomen: NBS, NT/ND, No HSM, No rigidity or guarding  MSK: No LE edema. No Calf tenderness  Vasc: R Balloon Pump (No surrounding erythema, drainage or tenderness to palpation)  Skin: Maculopapular rash on UE, UE and to lesser degree the chest. Skin is warm and dry to the touch.   Neuro: Alert and Awake. CN 2-12 Grossly intact. Moves all four extremities spontaneously.  Psych: Calm, Pleasant, Cooperative                          12.1   7.86  )-----------( 135      ( 06 Dec 2023 01:49 )             36.1       12-06    132<L>  |  100  |  31<H>  ----------------------------<  119<H>  4.8   |  22  |  1.41<H>    Ca    10.4      06 Dec 2023 01:49  Phos  4.1     12-06  Mg     1.8     12-06    TPro  6.9  /  Alb  3.9  /  TBili  0.3  /  DBili  x   /  AST  43<H>  /  ALT  109<H>  /  AlkPhos  61  12-06      Urinalysis Basic - ( 06 Dec 2023 01:49 )    Color: x / Appearance: x / SG: x / pH: x  Gluc: 119 mg/dL / Ketone: x  / Bili: x / Urobili: x   Blood: x / Protein: x / Nitrite: x   Leuk Esterase: x / RBC: x / WBC x   Sq Epi: x / Non Sq Epi: x / Bacteria: x        MICROBIOLOGY:  Vancomycin Level, Random: 13.1 ug/mL (12-06-23 @ 02:49)  v  .Blood Blood-Peripheral  12-04-23   No growth at 24 hours  --  --      .Blood Blood-Peripheral  12-04-23   No growth at 24 hours  --  --      .Blood Blood  11-30-23   Growth in anaerobic bottle: Cutibacterium acnes  Susceptibility to follow.  Direct identification is available within approximately 3-5  hours either by Blood Panel Multiplexed PCR or Direct  MALDI-TOF. Details: [email or web address]  --  Blood Culture PCR      .Blood Blood-Venous  11-29-23   No growth at 5 days  --  --      .Blood Blood-Peripheral  11-18-23   No growth at 5 days  --  --      .Blood Blood-Peripheral  11-18-23   No growth at 5 days  --  --      Clean Catch Clean Catch (Midstream)  11-18-23   10,000 - 49,000 CFU/mL Enterococcus faecalis  <10,000 CFU/ml Normal Urogenital kaitlynn present  --  Enterococcus faecalis      .Blood Blood  11-17-23   Growth in aerobic and anaerobic bottles: Enterococcus faecalis  See previous culture [id number]  Growth in aerobic bottle: Staphylococcus epidermidis  --  Staphylococcus epidermidis      .Blood Blood  11-17-23   Growth in aerobic bottle: Enterococcus faecalis  Growth in anaerobic bottle: Staphylococcus epidermidis "Susceptibilities  not performed"  Direct identification is available within approximately 3-5  hours either by Blood Panel Multiplexed PCR or Direct  MALDI-TOF. Details: [email or web address]  --  Blood Culture PCR  Blood Culture PCR  Enterococcus faecalis      .Blood Blood-Peripheral  11-10-23   No growth at 5 days  --  --        CMV IgG Antibody: 4.20 U/mL (11-10-23 @ 17:29)  Toxoplasma IgG Screen: <3.0 IU/mL (11-10-23 @ 17:29)  HIV-1 RNA Quantitative, Viral Load Log: NOT DET. lg /mL (11-10-23 @ 17:06)    Rapid RVP Result: NotDetec (12-05 @ 20:42)        RADIOLOGY:    <The imaging below has been reviewed and visualized by me independently. Findings as detailed in report below>    < from: Xray Chest 1 View- PORTABLE-Routine (Xray Chest 1 View- PORTABLE-Routine in AM.) (12.06.23 @ 04:03) >  IMPRESSION:  No active pulmonary disease.    < end of copied text >

## 2023-12-06 NOTE — PROGRESS NOTE ADULT - ASSESSMENT
58 male with HTN, CAD (s/p PCI 2008), HFrEF, CVA 2018, and T2DM presenting with chest pressure and unknown tachycardia that was shocked x1, C 11/1 found to have in-stent restenosis of pLAD and  of RCA with elevated RA and PA pressures and severely decreased. Admitted to CICU for management of cardiogenic shock and ADHF requiring IABP 11/1 -11/7, with hospital course c/b vfib arrest requiring reinsertion of IABP. Currently listed for transplant status 7 due to positive blood culture drawn on 11/30.    Plan:  ====================== NEUROLOGY=====================  Anxiety  - No Seroquel/antipsychotics since vfib arrest and prolonged QTC  - Psych Eval, recommended SSRI, but pt. refused   - Psych recommending atarax PRN  - Continue to monitor mental status    PT/Conditioning  - Continue band exercised while on bedrest s/t IABP    ==================== RESPIRATORY======================  Acute Hypoxemic Respiratory Failure  - s/p x2 intubations for cardiogenic pulm edema and the in setting of cardiac arrest, resolved - extubated 11/10  - Currently on room air with spO2 mid 90s  - Continue incentive spirometry and monitoring of sp02    Asthma  - c/w albuterol, symbicort and spiriva  - On trelegy at home  - Continue to monitor SpO2 with goal >94%    ====================CARDIOVASCULAR==================  Vfib arrest i/s/o ischemia  - Lido gtt off 11/13  - PO Amio load - total of 5g per EP complete 11/17, continue PO amio  - Amio held briefly for rising LFTs, resumed 12/5  - Keep K > 4, Mag > 2.2     Cardiogenic shock requiring IABP (11/1- 11/7, 11/9-)  - Likely 2/2 NSTEMI and ADHF  - 11/1 LH: pLAD 100 % in-stent restenosis & mRCA, 100 %. PCWP 30. IABP placed.  - 11/1 TTE: LV dilated. EF 32 %. Regional WMAs present, mod (grade 2) LV diastolic dysfunction  - 11/2 TTE: EF 22% and + LV thrombus  - 11/29 s/p 500 cc bolus  - IABP swapped 11/20 to RFA, continue 1:1 support  - Off Milrinone gtt @ 7:30 am 11/11  - [place] d/c'd due to elevated K levels  - c/w coreg 25 BID for GDMT  - HIT and HAIDER sent (12/3) as per HF   - back to status 7 on 12/4 due to positive growth in blood culture drawn on 11/30  - 12/5 - reduced hydralazine 100 TID to 75 TID and ISD 40 TID to 30 TID for AL reduction    NSTEMI iso stent re-occlusion of pLAD and 100%  of RCA  - EKG on admission w/ LBBB  - DAPT: c/w ASA, Brilinta d/c'd per transplant w/u  - c/w lipitor 80  - cMR deferred given necessity of IABP  - CT sx not recommending CABG, undergoing AT eval    LV thrombus  - c/w heparin gtt    ===================== RENAL =========================  Non-oliguric ARIC, resolved   - Baseline Cr: 1-1.22  - Renal US: no evidence of renal artery stenosis  - Trend BMP, lytes daily, replace as needed  - Continue Strict I/Os, avoid nephrotoxins    Mild Hyponatremia/Hyperkalemia iso ARIC and or new CKD baseline  - Continue fluid restriction   - Urea powder d/c'd per renal  - Trend daily  - Continue monitoring urine output, lytes, SCr/ BUN  - Replete lytes prn with goal K >4 and Mg >2    =============== GASTROINTESTINAL===================  Constipation/ileus, resolved  - s/p multiple BMs, symptoms improving   - c/w diet    ===================ENDO====================  Type 2 DM  - A1c 8.3  - Continue lantus, premeal, low ISS  - continue FS    ===================HEMATOLOGIC/ONC ===================  - H/H & plts stable  - Monitor H/H and plts  - VTE PPX: heparin gtt    ==================INFECTIOUS DISEASE================  # Bacteremia  - Repeat BCx drawn 11/30 for leukocytosis to 12k - positive on 12/4, G+ rods in anaerobic bottle, suspect contaminant  - Repeat cultures x2 sent 12/4 per transplant ID recs  - Vancomycin by trough (12/4- )  - Awaiting final microbial ID     # Enterococcus faecalis bacteremia, resolved  - BCx 11/17+ for enterococcus faecalis x2, Staph epi x1 (likely contaminant)- pan sensitive   - Urine cx 11/18 + enterococcus faecalis  - BCx 11/18 no growth; repeat 11/30 NGTD  - IABP site swapped to RFA 11/20  - s/p Vancomycin 1g q12h (11/18-11/27)  - CT A/P negative for infectious pathology    # COVID, resolved  - Off airborne precautions 11/11    # Pre-transplant ID w/u   - Trend ID recs for serologies   - Colonoscopy 11/17 - normal   - Chest CT 11/17 - improved LLL aeration  - s/p immunizations    ==================INFECTIOUS DISEASE================  # Upper Extremity Rash  - Patient developed bilateral upper extremity rash after receiving Hepatitis A & B immunizations on 11/24  - Dermatology consulted 12/5   58 male with HTN, CAD (s/p PCI 2008), HFrEF, CVA 2018, and T2DM presenting with chest pressure and unknown tachycardia that was shocked x1, C 11/1 found to have in-stent restenosis of pLAD and  of RCA with elevated RA and PA pressures and severely decreased. Admitted to CICU for management of cardiogenic shock and ADHF requiring IABP 11/1 -11/7, with hospital course c/b vfib arrest requiring reinsertion of IABP. Currently listed for transplant status 7 due to positive blood culture drawn on 11/30.    Plan:  ====================== NEUROLOGY=====================  Anxiety  - No Seroquel/antipsychotics since vfib arrest and prolonged QTC  - Psych Eval, recommended SSRI, but pt. refused   - Psych recommending atarax PRN  - Continue to monitor mental status    PT/Conditioning  - Continue band exercised while on bedrest s/t IABP    ==================== RESPIRATORY======================  Acute Hypoxemic Respiratory Failure  - s/p x2 intubations for cardiogenic pulm edema and the in setting of cardiac arrest, resolved - extubated 11/10  - Currently on room air with spO2 mid 90s  - Continue incentive spirometry and monitoring of sp02    Asthma  - c/w albuterol, symbicort and spiriva  - On trelegy at home  - Continue to monitor SpO2 with goal >94%    ====================CARDIOVASCULAR==================  Vfib arrest i/s/o ischemia  - Lido gtt off 11/13  - PO Amio load - total of 5g per EP complete 11/17, continue PO amio  - Amio held briefly for rising LFTs, resumed 12/5  - Keep K > 4, Mag > 2.2     Cardiogenic shock requiring IABP (11/1- 11/7, 11/9-)  - Likely 2/2 NSTEMI and ADHF  - 11/1 LH: pLAD 100 % in-stent restenosis & mRCA, 100 %. PCWP 30. IABP placed.  - 11/1 TTE: LV dilated. EF 32 %. Regional WMAs present, mod (grade 2) LV diastolic dysfunction  - 11/2 TTE: EF 22% and + LV thrombus  - 11/29 s/p 500 cc bolus  - IABP swapped 11/20 to RFA, continue 1:1 support  - Off Milrinone gtt @ 7:30 am 11/11  - [place] d/c'd due to elevated K levels  - c/w coreg 25 BID for GDMT  - HIT and HAIDER sent (12/3) as per HF   - back to status 7 on 12/4 due to positive growth in blood culture drawn on 11/30  - 12/5 - reduced hydralazine 100 TID to 75 TID and ISD 40 TID to 30 TID for AL reduction    NSTEMI iso stent re-occlusion of pLAD and 100%  of RCA  - EKG on admission w/ LBBB  - DAPT: c/w ASA, Brilinta d/c'd per transplant w/u  - c/w lipitor 80  - cMR deferred given necessity of IABP  - CT sx not recommending CABG, undergoing AT eval    LV thrombus  - c/w heparin gtt    ===================== RENAL =========================  Non-oliguric ARIC, resolved   - Baseline Cr: 1-1.22  - Renal US: no evidence of renal artery stenosis  - Trend BMP, lytes daily, replace as needed  - Continue Strict I/Os, avoid nephrotoxins    Mild Hyponatremia/Hyperkalemia iso ARIC and or new CKD baseline  - Continue fluid restriction   - Urea powder d/c'd per renal  - Trend daily  - Continue monitoring urine output, lytes, SCr/ BUN  - Replete lytes prn with goal K >4 and Mg >2    =============== GASTROINTESTINAL===================  Constipation/ileus, resolved  - s/p multiple BMs, symptoms improving   - c/w diet    ===================ENDO====================  Type 2 DM  - A1c 8.3  - Continue lantus, premeal, low ISS  - continue FS    ===================HEMATOLOGIC/ONC ===================  - H/H & plts stable  - Monitor H/H and plts  - VTE PPX: heparin gtt    ==================INFECTIOUS DISEASE================  # Bacteremia  - Repeat BCx drawn 11/30 for leukocytosis to 12k - positive on 12/4, G+ rods in anaerobic bottle, suspect contaminant  - Repeat cultures x2 sent 12/4 per transplant ID recs  - Vancomycin by trough (12/4- )  - Awaiting final microbial ID     # Enterococcus faecalis bacteremia, resolved  - BCx 11/17+ for enterococcus faecalis x2, Staph epi x1 (likely contaminant)- pan sensitive   - Urine cx 11/18 + enterococcus faecalis  - BCx 11/18 no growth; repeat 11/30 NGTD  - IABP site swapped to RFA 11/20  - s/p Vancomycin 1g q12h (11/18-11/27)  - CT A/P negative for infectious pathology    # COVID, resolved  - Off airborne precautions 11/11    # Pre-transplant ID w/u   - Trend ID recs for serologies   - Colonoscopy 11/17 - normal   - Chest CT 11/17 - improved LLL aeration  - s/p immunizations    ==================INFECTIOUS DISEASE================  # Upper Extremity Rash  - Patient developed bilateral upper extremity rash after receiving Hepatitis A & B immunizations on 11/24  - Dermatology consulted 12/5   58 male with HTN, CAD (s/p PCI 2008), HFrEF, CVA 2018, and T2DM presenting with chest pressure and unknown tachycardia that was shocked x1, C 11/1 found to have in-stent restenosis of pLAD and  of RCA with elevated RA and PA pressures and severely decreased. Admitted to CICU for management of cardiogenic shock and ADHF requiring IABP 11/1 -11/7, with hospital course c/b vfib arrest requiring reinsertion of IABP. Currently listed for transplant status 7 due to positive blood culture drawn on 11/30.    Plan:  ====================== NEUROLOGY=====================  Anxiety  - No Seroquel/antipsychotics since vfib arrest and prolonged QTC  - Psych Eval, recommended SSRI, but pt. refused   - Psych recommending atarax PRN  - Continue to monitor mental status    PT/Conditioning  - Continue band exercised while on bedrest s/t IABP    ==================== RESPIRATORY======================  Acute Hypoxemic Respiratory Failure  - s/p x2 intubations for cardiogenic pulm edema and the in setting of cardiac arrest, resolved - extubated 11/10  - Currently maintaining >95% sats on room air  - Continue incentive spirometry and monitoring of sp02    Asthma  - c/w albuterol, symbicort and spiriva  - On trelegy at home  - Continue to monitor SpO2 with goal >94%    ====================CARDIOVASCULAR==================  Vfib arrest i/s/o ischemia  - Lido gtt off 11/13  - PO Amio load - total of 5g per EP complete 11/17, continue PO amio  - Amio held for rising LFTs, continue to hold  - Keep K > 4, Mag > 2.2     Cardiogenic shock requiring IABP (11/1- 11/7, 11/9-)  - Likely 2/2 NSTEMI and ADHF  - 11/1 LHC: pLAD 100 % in-stent restenosis & mRCA, 100 %. PCWP 30. IABP placed.  - 11/1 TTE: LV dilated. EF 32 %. Regional WMAs present, mod (grade 2) LV diastolic dysfunction  - 11/2 TTE: EF 22% and + LV thrombus  - 11/29 s/p 500 cc bolus  - IABP swapped 11/20 to RFA, continue 1:1 support  - Off Milrinone gtt @ 7:30 am 11/11  - [place] d/c'd due to elevated K levels  - c/w coreg 25 BID for GDMT  - HIT and HAIDER sent (12/3) as per HF   - back to status 7 on 12/4 due to positive growth in blood culture drawn on 11/30  - 12/5 - reduced hydralazine 100 TID to 75 TID and ISD 40 TID to 30 TID for AL reduction    NSTEMI iso stent re-occlusion of pLAD and 100%  of RCA  - EKG on admission w/ LBBB  - DAPT: c/w ASA, Brilinta d/c'd per transplant w/u  - c/w lipitor 80  - cMR deferred given necessity of IABP  - CT sx not recommending CABG, undergoing AT eval    LV thrombus  - c/w heparin gtt    ===================== RENAL =========================  Non-oliguric ARIC, resolved   - Baseline Cr: 1-1.22  - Renal US: no evidence of renal artery stenosis  - Trend BMP, lytes daily, replace as needed  - Continue Strict I/Os, avoid nephrotoxins    Mild Hyponatremia/Hyperkalemia iso ARIC and or new CKD baseline  - Continue fluid restriction   - Urea powder d/c'd per renal  - Trend daily  - Continue monitoring urine output, lytes, SCr/ BUN  - Replete lytes prn with goal K >4 and Mg >2    =============== GASTROINTESTINAL===================  Constipation/ileus, resolved  - s/p multiple BMs, symptoms improving   - c/w diet    ===================ENDO====================  Type 2 DM  - A1c 8.3  - Continue lantus, premeal, low ISS  - continue FS    ===================HEMATOLOGIC/ONC ===================  - H/H & plts stable  - Monitor H/H and plts  - VTE PPX: heparin gtt    ==================INFECTIOUS DISEASE================  # Bacteremia  - Repeat BCx drawn 11/30 for leukocytosis to 12k - positive on 12/4, G+ rods in anaerobic bottle, suspect contaminant  - Repeat cultures x2 sent 12/4 per transplant ID recs - no growth on day 2  - Vancomycin by trough (12/4- )  - Awaiting final microbial ID     # Enterococcus faecalis bacteremia, resolved  - BCx 11/17+ for enterococcus faecalis x2, Staph epi x1 (likely contaminant)- pan sensitive   - Urine cx 11/18 + enterococcus faecalis  - BCx 11/18 no growth   - IABP site swapped to RFA 11/20  - s/p Vancomycin 1g q12h (11/18-11/27)  - CT A/P negative for infectious pathology    # COVID, resolved  - Off airborne precautions 11/11    # Pre-transplant ID w/u   - Trend ID recs for serologies   - Colonoscopy 11/17 - normal   - Chest CT 11/17 - improved LLL aeration  - s/p immunizations    ==================INFECTIOUS DISEASE================  # Upper Extremity Rash  - Patient developed bilateral upper extremity rash after receiving Hepatitis A & B immunizations on 11/24  - Dermatology consulted 12/5 - not likely to be related to vanco, can continue per ID recs  - Recommend triamcinolone cream, patient refusing topical therapies at this time  - RVP repeated to rule out viral etiology, negative   58 male with HTN, CAD (s/p PCI 2008), HFrEF, CVA 2018, and T2DM presenting with chest pressure and unknown tachycardia that was shocked x1, C 11/1 found to have in-stent restenosis of pLAD and  of RCA with elevated RA and PA pressures and severely decreased. Admitted to CICU for management of cardiogenic shock and ADHF requiring IABP 11/1 -11/7, with hospital course c/b vfib arrest requiring reinsertion of IABP. Currently listed for transplant status 7 due to positive blood culture drawn on 11/30.    Plan:  ====================== NEUROLOGY=====================  Anxiety  - No Seroquel/antipsychotics since vfib arrest and prolonged QTC  - Psych Eval, recommended SSRI, but pt. refused   - Psych recommending atarax PRN  - Continue to monitor mental status    PT/Conditioning  - Continue band exercised while on bedrest s/t IABP    ==================== RESPIRATORY======================  Acute Hypoxemic Respiratory Failure  - s/p x2 intubations for cardiogenic pulm edema and the in setting of cardiac arrest, resolved - extubated 11/10  - Currently maintaining >95% sats on room air  - Continue incentive spirometry and monitoring of sp02    Asthma  - c/w albuterol, symbicort and spiriva  - On trelegy at home  - Continue to monitor SpO2 with goal >94%    ====================CARDIOVASCULAR==================  Vfib arrest i/s/o ischemia  - Lido gtt off 11/13  - PO Amio load - total of 5g per EP complete 11/17, continue PO amio  - Amio held for rising LFTs, continue to hold  - Keep K > 4, Mag > 2.2     Cardiogenic shock requiring IABP (11/1- 11/7, 11/9-)  - Likely 2/2 NSTEMI and ADHF  - 11/1 LHC: pLAD 100 % in-stent restenosis & mRCA, 100 %. PCWP 30. IABP placed.  - 11/1 TTE: LV dilated. EF 32 %. Regional WMAs present, mod (grade 2) LV diastolic dysfunction  - 11/2 TTE: EF 22% and + LV thrombus  - 11/29 s/p 500 cc bolus  - IABP swapped 11/20 to RFA, continue 1:1 support  - Off Milrinone gtt @ 7:30 am 11/11  - [place] d/c'd due to elevated K levels  - c/w coreg 25 BID for GDMT  - HIT and HAIDER sent (12/3) as per HF   - back to status 7 on 12/4 due to positive growth in blood culture drawn on 11/30  - 12/5 - reduced hydralazine 100 TID to 75 TID and ISD 40 TID to 30 TID for AL reduction    NSTEMI iso stent re-occlusion of pLAD and 100%  of RCA  - EKG on admission w/ LBBB  - DAPT: c/w ASA, Brilinta d/c'd per transplant w/u  - c/w lipitor 80  - cMR deferred given necessity of IABP  - CT sx not recommending CABG, undergoing AT eval    LV thrombus  - c/w heparin gtt    ===================== RENAL =========================  Non-oliguric ARIC, resolved   - Baseline Cr: 1-1.22  - Renal US: no evidence of renal artery stenosis  - Trend BMP, lytes daily, replace as needed  - Continue Strict I/Os, avoid nephrotoxins    Mild Hyponatremia/Hyperkalemia iso ARIC and or new CKD baseline  - Continue fluid restriction   - Urea powder d/c'd per renal  - Trend daily  - Continue monitoring urine output, lytes, SCr/ BUN  - Replete lytes prn with goal K >4 and Mg >2    =============== GASTROINTESTINAL===================  Constipation/ileus, resolved  - s/p multiple BMs, symptoms improving   - c/w diet    ===================ENDO====================  Type 2 DM  - A1c 8.3  - Continue lantus, premeal, low ISS  - continue FS    ===================HEMATOLOGIC/ONC ===================  - H/H & plts stable  - Monitor H/H and plts  - VTE PPX: heparin gtt    ==================INFECTIOUS DISEASE================  # Bacteremia  - Repeat BCx drawn 11/30 for leukocytosis to 12k - positive on 12/4, G+ rods in anaerobic bottle, suspect contaminant  - Repeat cultures x2 sent 12/4 per transplant ID recs - no growth on day 2  - Vancomycin by trough (12/4- )  - Awaiting final microbial ID     # Enterococcus faecalis bacteremia, resolved  - BCx 11/17+ for enterococcus faecalis x2, Staph epi x1 (likely contaminant)- pan sensitive   - Urine cx 11/18 + enterococcus faecalis  - BCx 11/18 no growth   - IABP site swapped to RFA 11/20  - s/p Vancomycin 1g q12h (11/18-11/27)  - CT A/P negative for infectious pathology    # COVID, resolved  - Off airborne precautions 11/11    # Pre-transplant ID w/u   - Trend ID recs for serologies   - Colonoscopy 11/17 - normal   - Chest CT 11/17 - improved LLL aeration  - s/p immunizations    ==================INFECTIOUS DISEASE================  # Upper Extremity Rash  - Patient developed bilateral upper extremity rash after receiving Hepatitis A & B immunizations on 11/24  - Dermatology consulted 12/5 - not likely to be related to vanco, can continue per ID recs  - Recommend triamcinolone cream, patient refusing topical therapies at this time  - RVP repeated to rule out viral etiology, negative

## 2023-12-06 NOTE — PROGRESS NOTE ADULT - ATTENDING COMMENTS
History of anxiety disorder and CAD s/p PCI  Admitted with cardiogenic shock and respiratory failure in the setting of stent restenosis  Transferred out and had VT/VF cardiac arrest  Readmitted to CICU  Status 7 for heart transplant given +bcx  A+Ox3, anxious - continue Atarax prn  Cardiogenic shock requiring IABP, also on Bidil for afterload reduction - will continue   S/p VT/VF arrest on Amiodarone PO and Coreg  SpO2 mid 90s on room air  Soft diet  Likely CKD post arrest, compensated on exam, hyponatremic, not overloaded on exam  Continue to hold diuretics  H/H low but acceptable on Heparin drip for LV thrombus and IABP  Afebrile, bcx 1 bottle +GPR, awaiting speciation, on Vancomycin; ID is following  Sugars controlled on Lantus/Admelog  Dermatology recommended steroid cream for rash on b/l UEs but patient has deferred  IABP 11/20

## 2023-12-06 NOTE — PROGRESS NOTE ADULT - PROBLEM SELECTOR PLAN 4
- Cr on admission 1.2, peaked to 4--> 1.06-> 1.3 ->1.41  -related to rash?  - Support as above.  -monitor closely  - Appreciate CardioRenal input.

## 2023-12-06 NOTE — PROGRESS NOTE ADULT - ATTENDING COMMENTS
Patient found in bed in NAD. He remains on support with IABP 1:1 and hydralazine/ISDN for additional afterload reduction. His renal function remains stable but creatinine slightly higher. Bcx are NGTD. Ok to reactivate as status 2.     Continue IABP support 1:1   Continue ISDN/hydralazine 40/75 mg TID   Continue carvedilol 25 mg BID   Give small fluid bolus 250 ml  Ok to reactivate as status 2, discussed with transplant ID and transplant team

## 2023-12-06 NOTE — PROGRESS NOTE ADULT - PROBLEM SELECTOR PLAN 1
- L IABP at 1:1, placed 11/9. Exchange to LFA given high grade bacteremia on 11/20. On AC with Heparin infusion (also for LV thrombus)  - Continue Coreg 25 mg BID hold for MAP <65  - Continue HDZN 100 mg TID and ISDN 40 mg TID, hold for MAP <65  - Off romel given HyperK  - AT evaluation: Accepted for transplant, currently listed UNOS Status 2. ABO A. PRA 0% 11/13. Last Brilinta dose was on 11/13, However, now status 7 on 12/4 given + blood culture.   -GM + rods in blood culture on 11/30 (reported on 12/4), now status 7, pending repeat cultures  -on vancomycin, appreciate Transplant ID recs.   - Please keep primary Dr. Banuelos [phone] in the loop per family request. - L IABP at 1:1, placed 11/9. Exchange to LFA given high grade bacteremia on 11/20. On AC with Heparin infusion (also for LV thrombus)  - Continue Coreg 25 mg BID hold for MAP <65  - Continue HDZN 100 mg TID and ISDN 40 mg TID, hold for MAP <65  - Off romel given HyperK  - AT evaluation: Accepted for transplant, currently listed UNOS Status 2. ABO A. PRA 0% 11/13. Last Brilinta dose was on 11/13, However, now status 7 on 12/4 given + blood culture.   -GM + rods in blood culture on 11/30 (reported on 12/4), now status 7, pending repeat cultures  -on vancomycin, appreciate Transplant ID recs.   - Please keep primary Dr. Banuelos [phone] in the loop per family request.

## 2023-12-06 NOTE — PROGRESS NOTE ADULT - ASSESSMENT
59 yo M with PMHx of HTN, CAD w/ 1 stent in 2009, ICH (2008) presented on 11/1 with abn ekg. Patient presented to [facility] where he was found to have STEMI, recommended to get cath however patient did not want to get it there so he left and came here.   EKG here with ST depression in lateral leads and elevation in anterior leads   Prior to C found to be tachycardic, dyspneic, intubated   C 11/1 with chronic total occlusion of LAD and RCA, with elevated RA and PA pressures  TTE 11/1 with severely decreased EF 32%, s/p IABP 11/1    RVP (11/1) Positive for COVID19  RVP (11/10) Negative  BCx (11/2, 11/4) NGTD  ETT Culture (11/2) NGTD  MRSA/MSSA PCR (11/2, 11/10) Negative  UA (11/10) 1 WBC    CXR (11/10) Clear Lungs  TTE (11/2) Left ventricular thrombus.    #Positive Blood Cultures (11/30 - Cutibacterium)  Growth this far from procurement raises question of contaminant  Given ID as Cutibacterium skin procurement contaminant is favored  Repeat blood cultures with NGTD  --Hold off on further Vancomycin  --No absolute ID contraindication to re-activate from transplant.   --Continue to follow CBC with diff  --Continue to follow temperature curve    #Rash  ?Secondary to Cefepime on 11/2?  ?Secondary to hepatitis vaccine?  ?Secondary to Vancomycin  --Appreciate Dermatology input  --Follow LFT's and CBC with Diff (For Eosinophil Counts)    #Enterococcus Bacteremia, Leukocytosis, Pre-Heart Transplant Evaluation Serologies  Concern for either central line or urinary source  CT A/P Noncontrast (11/18) No acute process  s/p ten-days of IV Vancomycin last dose received on 11/27    #Encounter to Vaccinate Patient  COVID19: COVID19 Infection This Admission. Would consider Vaccination in ~3 months  Influenza: declined  Pneumococcal: Would benefit from PCV20  HAV: received first dose 11/24  HBV: received first dose Heplisav 11/24  MMR: Not Mumps Immune, if >1 month until transplant would consider MMR dose  Varicella: Immune  Shingles: recommend Shingrix series  Tdap: received Tdap booster this admission    I will continue to follow. Please feel free to contact me with any further questions.    Silviano Manuel M.D.  [facility] Division of Infectious Disease  8AM-5PM Monday - Friday: Available on [employer] Teams  After Hours and Holidays (or if no response on [employer] Teams): Please contact the Infectious Diseases Office at [phone]    The above assessment and plan were discussed with CTICU Team  59 yo M with PMHx of HTN, CAD w/ 1 stent in 2009, ICH (2008) presented on 11/1 with abn ekg. Patient presented to [facility] where he was found to have STEMI, recommended to get cath however patient did not want to get it there so he left and came here.   EKG here with ST depression in lateral leads and elevation in anterior leads   Prior to C found to be tachycardic, dyspneic, intubated   C 11/1 with chronic total occlusion of LAD and RCA, with elevated RA and PA pressures  TTE 11/1 with severely decreased EF 32%, s/p IABP 11/1    RVP (11/1) Positive for COVID19  RVP (11/10) Negative  BCx (11/2, 11/4) NGTD  ETT Culture (11/2) NGTD  MRSA/MSSA PCR (11/2, 11/10) Negative  UA (11/10) 1 WBC    CXR (11/10) Clear Lungs  TTE (11/2) Left ventricular thrombus.    #Positive Blood Cultures (11/30 - Cutibacterium)  Growth this far from procurement raises question of contaminant  Given ID as Cutibacterium skin procurement contaminant is favored  Repeat blood cultures with NGTD  --Hold off on further Vancomycin  --No absolute ID contraindication to re-activate from transplant.   --Continue to follow CBC with diff  --Continue to follow temperature curve    #Rash  ?Secondary to Cefepime on 11/2?  ?Secondary to hepatitis vaccine?  ?Secondary to Vancomycin  --Appreciate Dermatology input  --Follow LFT's and CBC with Diff (For Eosinophil Counts)    #Enterococcus Bacteremia, Leukocytosis, Pre-Heart Transplant Evaluation Serologies  Concern for either central line or urinary source  CT A/P Noncontrast (11/18) No acute process  s/p ten-days of IV Vancomycin last dose received on 11/27    #Encounter to Vaccinate Patient  COVID19: COVID19 Infection This Admission. Would consider Vaccination in ~3 months  Influenza: declined  Pneumococcal: Would benefit from PCV20  HAV: received first dose 11/24  HBV: received first dose Heplisav 11/24  MMR: Not Mumps Immune, if >1 month until transplant would consider MMR dose  Varicella: Immune  Shingles: recommend Shingrix series  Tdap: received Tdap booster this admission    I will continue to follow. Please feel free to contact me with any further questions.    Silviano Manuel M.D.  [facility] Division of Infectious Disease  8AM-5PM Monday - Friday: Available on [employer] Teams  After Hours and Holidays (or if no response on [employer] Teams): Please contact the Infectious Diseases Office at [phone]    The above assessment and plan were discussed with CTICU Team

## 2023-12-06 NOTE — PROGRESS NOTE ADULT - SUBJECTIVE AND OBJECTIVE BOX
Patient seen and examined at bedside.    Overnight Events: No acute events overnight. Denies chest pain, palpitations, or shortness of breath. still complains of pruritic rash on b/l upper extremities.       REVIEW OF SYSTEMS:  Constitutional:     [ x] negative [ ] fevers [ ] chills [ ] weight loss [ ] weight gain  HEENT:                  [ x] negative [ ] dry eyes [ ] eye irritation [ ] postnasal drip [ ] nasal congestion  CV:                         [x ] negative  [ ] chest pain [ ] orthopnea [ ] palpitations [ ] murmur  Resp:                     [ x] negative [ ] cough [ ] shortness of breath [ ] dyspnea [ ] wheezing [ ] sputum [ ]hemoptysis  GI:                          [ x] negative [ ] nausea [ ] vomiting [ ] diarrhea [ ] constipation [ ] abd pain [ ] dysphagia   :                        [ x] negative [ ] dysuria [ ] nocturia [ ] hematuria [ ] increased urinary frequency  Musculoskeletal: [ x] negative [ ] back pain [ ] myalgias [ ] arthralgias [ ] fracture  Skin:                       [ x] negative [ ] rash [ ] itch  Neurological:        [ x] negative [ ] headache [ ] dizziness [ ] syncope [ ] weakness [ ] numbness  Psychiatric:           [ x] negative [ ] anxiety [ ] depression  Endocrine:            [ x] negative [ ] diabetes [ ] thyroid problem  Heme/Lymph:      [ x] negative [ ] anemia [ ] bleeding problem  Allergic/Immune: [x ] negative [ ] itchy eyes [ ] nasal discharge [ ] hives [ ] angioedema    [x ] All other systems negative  [ ] Unable to assess ROS due to    Current Meds:  aspirin  chewable 81 milliGRAM(s) Oral daily  atorvastatin 80 milliGRAM(s) Oral at bedtime  budesonide  80 MICROgram(s)/formoterol 4.5 MICROgram(s) Inhaler 2 Puff(s) Inhalation two times a day  carvedilol 25 milliGRAM(s) Oral every 12 hours  chlorhexidine 2% Cloths 1 Application(s) Topical daily  heparin  Infusion 1300 Unit(s)/Hr IV Continuous <Continuous>  hydrALAZINE 75 milliGRAM(s) Oral three times a day  hydrOXYzine hydrochloride 25 milliGRAM(s) Oral two times a day PRN  insulin glargine Injectable (LANTUS) 12 Unit(s) SubCutaneous at bedtime  insulin lispro (ADMELOG) corrective regimen sliding scale   SubCutaneous three times a day before meals  insulin lispro (ADMELOG) corrective regimen sliding scale   SubCutaneous at bedtime  insulin lispro Injectable (ADMELOG) 9 Unit(s) SubCutaneous three times a day before meals  isosorbide   dinitrate Tablet (ISORDIL) 30 milliGRAM(s) Oral three times a day  polyethylene glycol 3350 17 Gram(s) Oral two times a day  senna 2 Tablet(s) Oral at bedtime      PAST MEDICAL & SURGICAL HISTORY:  HTN (hypertension)      CAD (coronary artery disease)  2009; stent      Intracranial hemorrhage  2008      Respiratory arrest  december 1st      Myocardial infarction, unspecified MI type, unspecified artery      History of coronary artery stent placement          Vitals:  T(F): 98.1 (12-06), Max: 98.5 (12-05)  HR: 82 (12-06) (71 - 89)  BP: --  RR: 28 (12-06)  SpO2: 98% (12-06)  I&O's Summary    05 Dec 2023 07:01  -  06 Dec 2023 07:00  --------------------------------------------------------  IN: 1356 mL / OUT: 2040 mL / NET: -684 mL    06 Dec 2023 07:01  -  06 Dec 2023 15:05  --------------------------------------------------------  IN: 426 mL / OUT: 0 mL / NET: 426 mL        Physical Exam:  Appearance: No acute distress; well appearing  Eyes: PERRL, EOMI, pink conjunctiva  HENT: Normal oral mucosa  Cardiovascular: RRR, S1, S2, IABP in place.   Respiratory: Clear to auscultation bilaterally  Gastrointestinal: soft, non-tender, non-distended with normal bowel sounds  Musculoskeletal: No clubbing; no joint deformity   Neurologic: Non-focal  Lymphatic: No lymphadenopathy  Psychiatry: AAOx3, mood & affect appropriate  Skin: erythematous rash on bilat UE.                          12.1   7.86  )-----------( 135      ( 06 Dec 2023 01:49 )             36.1     12-06    132<L>  |  100  |  31<H>  ----------------------------<  119<H>  4.8   |  22  |  1.41<H>    Ca    10.4      06 Dec 2023 01:49  Phos  4.1     12-06  Mg     1.8     12-06    TPro  6.9  /  Alb  3.9  /  TBili  0.3  /  DBili  x   /  AST  43<H>  /  ALT  109<H>  /  AlkPhos  61  12-06    PT/INR - ( 06 Dec 2023 01:49 )   PT: 12.4 sec;   INR: 1.13 ratio         PTT - ( 06 Dec 2023 01:49 )  PTT:67.1 sec

## 2023-12-06 NOTE — PROGRESS NOTE ADULT - PROBLEM SELECTOR PLAN 5
BCx from 11/17 with GPC in pair/chains in both bottles; Mixed cultures Staph epi and Enterococcus faecalis   Repeat cultures from 11/18; NGTD  Currently on vancomycin. Has remote Hx PCN allergy per ID but unclear as he doesn't recall reaction. Cleared for OHT listing  -s/p IABP exchange from RFA to LFA on 11/20.  - + rods in blood culture on 11/30 (reported on 12/4), restarted on vancomycin, now status 7.   -f/u repeat cultures, NTD (48 hours will be this evening, 12/6)  appreciated transplant ID recs.

## 2023-12-06 NOTE — PROGRESS NOTE ADULT - ASSESSMENT
# Morbilliform rash--ddx includes 2/2 drug vs viral vs early DIHS.   Relevant inpatient medications:  - cefepime 11/2  - vancomycin 11/18-20, 11/25-27, 12/4-5  - hepatitis A vaccine 11/23, hep B vaccine 11/25, DTAP 11/26, shingrix 11/26    PLAN:   - Please trend daily CBC with diff and CMP  - Please obtain RVP  - Start clobetasol 0.05% ointment BID to affected areas. SE of prolonged use include skin thinning/striae/steroid acne    Will continue to follow.    Patient was seen at bedside and staffed in person with the dermatology attending Dr. Clark.   Recommendations were communicated with the primary team.  Please page [phone] for further related questions.    Dixie Garcia MD  Resident Physician, PGY3  [facility] Dermatology  Pager: [phone] # Morbilliform rash--ddx includes 2/2 drug vs viral vs early DIHS.   Relevant inpatient medications:  - cefepime 11/2  - vancomycin 11/18-20, 11/25-27, 12/4-5  - hepatitis A vaccine 11/23, hep B vaccine 11/25, DTAP 11/26, shingrix 11/26    PLAN:   - Please trend daily CBC with diff and CMP  - Please obtain RVP  - Start clobetasol 0.05% ointment BID to affected areas. SE of prolonged use include skin thinning/striae/steroid acne    Will continue to follow.    Patient was seen at bedside and staffed in person with the dermatology attending Dr. Clark.   Recommendations were communicated with the primary team.  Please page [phone] for further related questions.    Dixie Garcia MD  Resident Physician, PGY3  [facility] Dermatology  Pager: [phone] # Morbilliform rash--ddx includes 2/2 drug vs viral vs early DIHS.   Without fevers or eosinophilia at this time. Does have facial/ear erythema which can be seen in DIHS.  RVP 12/5 negative  Relevant inpatient medications:  - cefepime 11/2  - vancomycin 11/18-20, 11/25-27, 12/4-5  - hepatitis A vaccine 11/23, hep B vaccine 11/25, DTAP 11/26, shingrix 11/26    PLAN:   - Please trend daily CBC with diff and CMP  - Start clobetasol 0.05% ointment BID to affected areas. SE of prolonged use include skin thinning/striae/steroid acne    Will continue to follow.    Patient was seen at bedside and staffed in person with the dermatology attending Dr. Clark.   Recommendations were communicated with the primary team.  Please page [phone] for further related questions.    Dixie Garcia MD  Resident Physician, PGY3  [facility] Dermatology  Pager: [phone] # Morbilliform rash--ddx includes 2/2 drug vs viral vs early DIHS.   Without fevers or eosinophilia at this time. Does have facial/ear erythema which can be seen in DIHS.  RVP 12/5 negative  Relevant inpatient medications:  - cefepime 11/2  - vancomycin 11/18-20, 11/25-27, 12/4-5  - hepatitis A vaccine 11/23, hep B vaccine 11/25, DTAP 11/26, shingrix 11/26    PLAN:   - Please trend daily CBC with diff and CMP  - Start clobetasol 0.05% ointment BID to affected areas. SE of prolonged use include skin thinning/striae/steroid acne    Will continue to follow.    Patient was seen at bedside and staffed in person with the dermatology attending Dr. Clark.   Recommendations were communicated with the primary team.  Please page [phone] for further related questions.    Dixie Garcia MD  Resident Physician, PGY3  [facility] Dermatology  Pager: [phone] # Morbilliform rash--ddx includes 2/2 drug vs viral vs early DIHS.   Without fevers or eosinophilia at this time. Cr elevated from baseline (1-1.22), per chart review had non-oliguric ARIC. Does have facial/ear erythema which can be seen in DIHS.  RVP 12/5 negative  Relevant inpatient medications:  - cefepime 11/2  - vancomycin 11/18-20, 11/25-27, 12/4-5  - hepatitis A vaccine 11/23, hep B vaccine 11/25, DTAP 11/26, shingrix 11/26    PLAN:   - Please trend daily CBC with diff and CMP  - Start clobetasol 0.05% ointment BID to affected areas. SE of prolonged use include skin thinning/striae/steroid acne    Will continue to follow.    Patient was seen at bedside and staffed in person with the dermatology attending Dr. Clark.   Recommendations were communicated with the primary team.  Please page [phone] for further related questions.    Dixie Garcia MD  Resident Physician, PGY3  [facility] Dermatology  Pager: [phone] # Morbilliform rash--ddx includes 2/2 drug vs viral vs early DIHS.   Without fevers or eosinophilia at this time. Cr elevated from baseline (1-1.22), per chart review had non-oliguric ARIC. Does have facial/ear erythema which can be seen in DIHS.  RVP 12/5 negative  Relevant inpatient medications:  - cefepime 11/2  - vancomycin 11/18-20, 11/25-27, 12/4-5  - hepatitis A vaccine 11/23, hep B vaccine 11/25, DTAP 11/26, shingrix 11/26    PLAN:   - Please trend daily CBC with diff and CMP  - Start clobetasol 0.05% ointment BID to affected areas. SE of prolonged use include skin thinning/striae/steroid acne    Will continue to follow.    Patient was seen at bedside and staffed in person with the dermatology attending Dr. Clark.   Recommendations were communicated with the primary team.  Please page [phone] for further related questions.    Dixie Garcia MD  Resident Physician, PGY3  [facility] Dermatology  Pager: [phone]

## 2023-12-06 NOTE — PROGRESS NOTE ADULT - PROBLEM SELECTOR PLAN 3
- PMVT into VF arrest 11/8, 3 rounds with shocks  -currently on amio  -monitor LFTs  - IABP support as above  - EP following.

## 2023-12-06 NOTE — PROGRESS NOTE ADULT - SUBJECTIVE AND OBJECTIVE BOX
LD VALENCIA  58y Male  MRN:[id number]    Patient is a 58y old  Male who presents with a chief complaint of NSTEMI (01 Nov 2023 20:29)    HPI:  59yo M w/ hx HTN, CAD w/ 1 stent in 2009, ICH (2008) presenting with abn ekg. Patient presented to [facility] where he was found to have STEMI, recommended to get cath however patient did not want to get it there so it left and came here.  Patient initially had cough, congestion, fever, was placed on antibiotics on Sunday.  Started feeling nauseous and had a presyncopal event after which he presented to ED last night.  Had chest pain as well.  Chest pain is midsternal.  Not currently having chest pain.  Received 4 aspirin 30 min pta. (01 Nov 2023 15:11)      Patient seen and evaluated at bedside in CCU. interval events noted    Interval HPI: remain in ccu. IABP in place        PAST MEDICAL & SURGICAL HISTORY:  HTN (hypertension)      CAD (coronary artery disease)  2009; stent      Intracranial hemorrhage  2008      Respiratory arrest  december 1st      Myocardial infarction, unspecified MI type, unspecified artery      History of coronary artery stent placement          REVIEW OF SYSTEMS:  as per hpi     VITALS:   ICU Vital Signs Last 24 Hrs  T(C): 36.7 (06 Dec 2023 08:00), Max: 36.9 (05 Dec 2023 16:00)  T(F): 98.1 (06 Dec 2023 08:00), Max: 98.5 (05 Dec 2023 16:00)  HR: 85 (06 Dec 2023 12:00) (71 - 89)  BP: --  BP(mean): --  ABP: --  ABP(mean): --  RR: 35 (06 Dec 2023 12:00) (15 - 35)  SpO2: 99% (06 Dec 2023 12:00) (97% - 99%)    O2 Parameters below as of 06 Dec 2023 10:00  Patient On (Oxygen Delivery Method): room air            PHYSICAL EXAM:  GENERAL: NAD, comfortable   HEAD:  Atraumatic, Normocephalic  EYES: EOMI, PERRLA, conjunctiva and sclera clear  NECK: Supple, No JVD   CHEST/LUNG: Clear to auscultation bilaterally; No wheeze  HEART: Regular rate and rhythm; No murmurs, rubs, or gallops  ABDOMEN: Soft, Nontender, Nondistended; Bowel sounds present  EXTREMITIES:  2+ Peripheral Pulses, No clubbing, cyanosis, or edema  NEUROLOGY: nonfocal  SKIN: +rash  +iabp    Consultant(s) Notes Reviewed:  [x ] YES  [ ] NO  Care Discussed with Consultants/Other Providers [ x] YES  [ ] NO    MEDS:   MEDICATIONS  (STANDING):  aspirin  chewable 81 milliGRAM(s) Oral daily  atorvastatin 80 milliGRAM(s) Oral at bedtime  budesonide  80 MICROgram(s)/formoterol 4.5 MICROgram(s) Inhaler 2 Puff(s) Inhalation two times a day  carvedilol 25 milliGRAM(s) Oral every 12 hours  chlorhexidine 2% Cloths 1 Application(s) Topical daily  heparin  Infusion 1300 Unit(s)/Hr (14 mL/Hr) IV Continuous <Continuous>  hydrALAZINE 75 milliGRAM(s) Oral three times a day  insulin glargine Injectable (LANTUS) 12 Unit(s) SubCutaneous at bedtime  insulin lispro (ADMELOG) corrective regimen sliding scale   SubCutaneous three times a day before meals  insulin lispro (ADMELOG) corrective regimen sliding scale   SubCutaneous at bedtime  insulin lispro Injectable (ADMELOG) 9 Unit(s) SubCutaneous three times a day before meals  isosorbide   dinitrate Tablet (ISORDIL) 30 milliGRAM(s) Oral three times a day  polyethylene glycol 3350 17 Gram(s) Oral two times a day  senna 2 Tablet(s) Oral at bedtime  vancomycin  IVPB 1500 milliGRAM(s) IV Intermittent once    MEDICATIONS  (PRN):  hydrOXYzine hydrochloride 25 milliGRAM(s) Oral two times a day PRN Anxiety        ALLERGIES:  penicillins (Unknown)      LABS:                                        12.1   7.86  )-----------( 135      ( 06 Dec 2023 01:49 )             36.1   12-06    132<L>  |  100  |  31<H>  ----------------------------<  119<H>  4.8   |  22  |  1.41<H>    Ca    10.4      06 Dec 2023 01:49  Phos  4.1     12-06  Mg     1.8     12-06    TPro  6.9  /  Alb  3.9  /  TBili  0.3  /  DBili  x   /  AST  43<H>  /  ALT  109<H>  /  AlkPhos  61  12-06      < from: CT Abdomen and Pelvis No Cont (11.28.23 @ 03:38) >  IMPRESSION:  Mildly dilated colon and prominent but not overly dilated small bowel   with air-fluid levels. No discrete transition point. Findings are   suggestive of ileus.    Intra-aortic balloon pump with the inferior marker at the level of the   inferior mesenteric artery and the balloon overlying the origins of the   renal arteries, celiac artery, and superior mesenteric artery. Consider   repositioning. Concern for IABP positioning was discussed with LANETTE Hawthorne   on 11/28/2023 at 3:42 AM by Dr. Shepard.    < end of copied text >          < from: Colonoscopy (11.17.23 @ 10:35) >                                                                                                        Impression:          - The entire examined colon is normal on direct and retroflexion views.                       - No specimens collected.  Recommendation:      - Resume previous diet today.                       - No large polyps or masses detected, No objection from GI standpoint to                 proceed with heart transplantation/advanced heart therapies.                       - Please call back should any further questions or concerns arise.    < end of copied text >     < from: Xray Chest 1 View- PORTABLE-Urgent (11.01.23 @ 07:42) >    IMPRESSION:  Clear lungs.    ---End of Report ---        < end of copied text >  < from: TTE W or WO Ultrasound Enhancing Agent (11.01.23 @ 10:23) >  _____________________________     CONCLUSIONS:      1. Left ventricular cavity is moderately dilated. Left ventricular wall thickness is normal. Left ventricular systolic function is severely decreased with an ejection fraction of 32 % by Chinchilla's method of disks. Regional wall motion abnormalities present.   2. Multiple segmental abnormalities exist. See findings.   3. There is moderate (grade 2) left ventricular diastolic dysfunction, with indeterminant filling pressure.   4. Normal right ventricular cavity size, wall thickness, and systolic function.   5. No significant valvular disease.   6. No pericardial effusion seen.   7. Compared to the transthoracic echocardiogram performed on 1/25/2017 the areas of akinesis are unchanged but there has been a decline in LV systolic function with new areas of hypokinesis.    __________________________________________________________________    < end of copied text >  < from: TTE Limited W or WO Ultrasound Enhancing Agent (11.02.23 @ 07:41) >  __________________________     CONCLUSIONS:      1. After obtaining consent, Definity ultrasound enhancing agent was given for enhanced left ventricular opacification and improved delineation of the left ventricular endocardial borders. Left ventricular systolic function is severely decreased with a calculated ejection fraction of 22 % by the Chinchilla's biplane method of disks. There is a left ventricular thrombus.   2. Findings were discussed with Litzy BOSS on 11/2/2023 at 8.49am.   3. There is a left ventricular thrombus.    _________________________________________________________________    < end of copied text >   LD VALENCIA  58y Male  MRN:[id number]    Patient is a 58y old  Male who presents with a chief complaint of NSTEMI (01 Nov 2023 20:29)    HPI:  59yo M w/ hx HTN, CAD w/ 1 stent in 2009, ICH (2008) presenting with abn ekg. Patient presented to [facility] where he was found to have STEMI, recommended to get cath however patient did not want to get it there so it left and came here.  Patient initially had cough, congestion, fever, was placed on antibiotics on Sunday.  Started feeling nauseous and had a presyncopal event after which he presented to ED last night.  Had chest pain as well.  Chest pain is midsternal.  Not currently having chest pain.  Received 4 aspirin 30 min pta. (01 Nov 2023 15:11)      Patient seen and evaluated at bedside in CCU. interval events noted    Interval HPI: remain in ccu. IABP in place        PAST MEDICAL & SURGICAL HISTORY:  HTN (hypertension)      CAD (coronary artery disease)  2009; stent      Intracranial hemorrhage  2008      Respiratory arrest  december 1st      Myocardial infarction, unspecified MI type, unspecified artery      History of coronary artery stent placement          REVIEW OF SYSTEMS:  as per hpi     VITALS:   ICU Vital Signs Last 24 Hrs  T(C): 36.7 (06 Dec 2023 08:00), Max: 36.9 (05 Dec 2023 16:00)  T(F): 98.1 (06 Dec 2023 08:00), Max: 98.5 (05 Dec 2023 16:00)  HR: 85 (06 Dec 2023 12:00) (71 - 89)  BP: --  BP(mean): --  ABP: --  ABP(mean): --  RR: 35 (06 Dec 2023 12:00) (15 - 35)  SpO2: 99% (06 Dec 2023 12:00) (97% - 99%)    O2 Parameters below as of 06 Dec 2023 10:00  Patient On (Oxygen Delivery Method): room air            PHYSICAL EXAM:  GENERAL: NAD, comfortable   HEAD:  Atraumatic, Normocephalic  EYES: EOMI, PERRLA, conjunctiva and sclera clear  NECK: Supple, No JVD   CHEST/LUNG: Clear to auscultation bilaterally; No wheeze  HEART: Regular rate and rhythm; No murmurs, rubs, or gallops  ABDOMEN: Soft, Nontender, Nondistended; Bowel sounds present  EXTREMITIES:  2+ Peripheral Pulses, No clubbing, cyanosis, or edema  NEUROLOGY: nonfocal  SKIN: +rash  +iabp    Consultant(s) Notes Reviewed:  [x ] YES  [ ] NO  Care Discussed with Consultants/Other Providers [ x] YES  [ ] NO    MEDS:   MEDICATIONS  (STANDING):  aspirin  chewable 81 milliGRAM(s) Oral daily  atorvastatin 80 milliGRAM(s) Oral at bedtime  budesonide  80 MICROgram(s)/formoterol 4.5 MICROgram(s) Inhaler 2 Puff(s) Inhalation two times a day  carvedilol 25 milliGRAM(s) Oral every 12 hours  chlorhexidine 2% Cloths 1 Application(s) Topical daily  heparin  Infusion 1300 Unit(s)/Hr (14 mL/Hr) IV Continuous <Continuous>  hydrALAZINE 75 milliGRAM(s) Oral three times a day  insulin glargine Injectable (LANTUS) 12 Unit(s) SubCutaneous at bedtime  insulin lispro (ADMELOG) corrective regimen sliding scale   SubCutaneous three times a day before meals  insulin lispro (ADMELOG) corrective regimen sliding scale   SubCutaneous at bedtime  insulin lispro Injectable (ADMELOG) 9 Unit(s) SubCutaneous three times a day before meals  isosorbide   dinitrate Tablet (ISORDIL) 30 milliGRAM(s) Oral three times a day  polyethylene glycol 3350 17 Gram(s) Oral two times a day  senna 2 Tablet(s) Oral at bedtime  vancomycin  IVPB 1500 milliGRAM(s) IV Intermittent once    MEDICATIONS  (PRN):  hydrOXYzine hydrochloride 25 milliGRAM(s) Oral two times a day PRN Anxiety        ALLERGIES:  penicillins (Unknown)      LABS:                                        12.1   7.86  )-----------( 135      ( 06 Dec 2023 01:49 )             36.1   12-06    132<L>  |  100  |  31<H>  ----------------------------<  119<H>  4.8   |  22  |  1.41<H>    Ca    10.4      06 Dec 2023 01:49  Phos  4.1     12-06  Mg     1.8     12-06    TPro  6.9  /  Alb  3.9  /  TBili  0.3  /  DBili  x   /  AST  43<H>  /  ALT  109<H>  /  AlkPhos  61  12-06      < from: CT Abdomen and Pelvis No Cont (11.28.23 @ 03:38) >  IMPRESSION:  Mildly dilated colon and prominent but not overly dilated small bowel   with air-fluid levels. No discrete transition point. Findings are   suggestive of ileus.    Intra-aortic balloon pump with the inferior marker at the level of the   inferior mesenteric artery and the balloon overlying the origins of the   renal arteries, celiac artery, and superior mesenteric artery. Consider   repositioning. Concern for IABP positioning was discussed with LANETTE Hawthorne   on 11/28/2023 at 3:42 AM by Dr. Shepard.    < end of copied text >          < from: Colonoscopy (11.17.23 @ 10:35) >                                                                                                        Impression:          - The entire examined colon is normal on direct and retroflexion views.                       - No specimens collected.  Recommendation:      - Resume previous diet today.                       - No large polyps or masses detected, No objection from GI standpoint to                 proceed with heart transplantation/advanced heart therapies.                       - Please call back should any further questions or concerns arise.    < end of copied text >     < from: Xray Chest 1 View- PORTABLE-Urgent (11.01.23 @ 07:42) >    IMPRESSION:  Clear lungs.    ---End of Report ---        < end of copied text >  < from: TTE W or WO Ultrasound Enhancing Agent (11.01.23 @ 10:23) >  _____________________________     CONCLUSIONS:      1. Left ventricular cavity is moderately dilated. Left ventricular wall thickness is normal. Left ventricular systolic function is severely decreased with an ejection fraction of 32 % by Chinchilla's method of disks. Regional wall motion abnormalities present.   2. Multiple segmental abnormalities exist. See findings.   3. There is moderate (grade 2) left ventricular diastolic dysfunction, with indeterminant filling pressure.   4. Normal right ventricular cavity size, wall thickness, and systolic function.   5. No significant valvular disease.   6. No pericardial effusion seen.   7. Compared to the transthoracic echocardiogram performed on 1/25/2017 the areas of akinesis are unchanged but there has been a decline in LV systolic function with new areas of hypokinesis.    __________________________________________________________________    < end of copied text >  < from: TTE Limited W or WO Ultrasound Enhancing Agent (11.02.23 @ 07:41) >  __________________________     CONCLUSIONS:      1. After obtaining consent, Definity ultrasound enhancing agent was given for enhanced left ventricular opacification and improved delineation of the left ventricular endocardial borders. Left ventricular systolic function is severely decreased with a calculated ejection fraction of 22 % by the Chinchilla's biplane method of disks. There is a left ventricular thrombus.   2. Findings were discussed with Litzy BOSS on 11/2/2023 at 8.49am.   3. There is a left ventricular thrombus.    _________________________________________________________________    < end of copied text >

## 2023-12-06 NOTE — PROGRESS NOTE ADULT - ASSESSMENT
59 yo male h/o htn, cad s/p pci, ICH, here with NSTEMI  s/p intubation and cath. now in CCU    NSTEMI  s/p  cath  cath results noted. multi vessel dz., CTSx f/u.   hep gtt  pt with vtach/fib arrest again on 11/8. s/p ACLS and ROSC.   now extubated  IABP in place  mngt as per CCU  plan for transplant as per HF and transplant team  transplant w/u ongoing.   s/p colonoscopy 11/17. normal  now listed for transplant as of 11/22    LV thrombus   hep gtt    acute on chronic systolic heart failure  heart failure team f/u    pna  resolved     covid  resolved  now off isolation     h/o ICH  resolved    agitation  psy consult f/u    bacteremia  id following  iv abx  f/u repeat cult      vomiting and abd pain  ileus on CT  bowel regimen  gi f/u  +bm    now resolved.     IABP malposition on CT  mngt as per ccu. iabp adjusted    rash  possible drug reaction  derm f/u    mngt as per CCU team          Advanced care planning was discussed with patient and family.  Advanced care planning forms were reviewed and discussed as appropriate.  Differential diagnosis and plan of care discussed with patient after the evaluation.   Pain assessed and judicious use of narcotics when appropriate was discussed.  Importance of Fall prevention discussed.  Counseling on Smoking and Alcohol cessation was offered when appropriate.  Counseling on Diet, exercise, and medication compliance was done.       Approx 75 minutes spent. 57 yo male h/o htn, cad s/p pci, ICH, here with NSTEMI  s/p intubation and cath. now in CCU    NSTEMI  s/p  cath  cath results noted. multi vessel dz., CTSx f/u.   hep gtt  pt with vtach/fib arrest again on 11/8. s/p ACLS and ROSC.   now extubated  IABP in place  mngt as per CCU  plan for transplant as per HF and transplant team  transplant w/u ongoing.   s/p colonoscopy 11/17. normal  now listed for transplant as of 11/22    LV thrombus   hep gtt    acute on chronic systolic heart failure  heart failure team f/u    pna  resolved     covid  resolved  now off isolation     h/o ICH  resolved    agitation  psy consult f/u    bacteremia  id following  iv abx  f/u repeat cult      vomiting and abd pain  ileus on CT  bowel regimen  gi f/u  +bm    now resolved.     IABP malposition on CT  mngt as per ccu. iabp adjusted    rash  possible drug reaction  derm f/u    mngt as per CCU team          Advanced care planning was discussed with patient and family.  Advanced care planning forms were reviewed and discussed as appropriate.  Differential diagnosis and plan of care discussed with patient after the evaluation.   Pain assessed and judicious use of narcotics when appropriate was discussed.  Importance of Fall prevention discussed.  Counseling on Smoking and Alcohol cessation was offered when appropriate.  Counseling on Diet, exercise, and medication compliance was done.       Approx 75 minutes spent. Eucrisa Counseling: Patient may experience a mild burning sensation during topical application. Eucrisa is not approved in children less than 2 years of age.

## 2023-12-06 NOTE — PROGRESS NOTE ADULT - SUBJECTIVE AND OBJECTIVE BOX
DEVORAH VALENCIA  MRN-[id number]  Patient is a 58y old  Male who presents with a chief complaint of cardiogenic shock (06 Dec 2023 07:45)    HPI:  pt seen and approx 1:30 pm  in [facility]    57yo M w/ hx HTN, CAD w/ 1 stent in , ICH () presenting with abn ekg. Patient presented to [facility] where he was found to have STEMI, recommended to get cath however patient did not want to get it there so it left and came here.  Patient initially had cough, congestion, fever, was placed on antibiotics on .  Started feeling nauseous and had a presyncopal event after which he presented to ED last night.  Had chest pain as well.  Chest pain is midsternal.  Not currently having chest pain.  Received 4 aspirin 30 min pta. (2023 15:11)      Hospital Course:    24 HOUR EVENTS:    REVIEW OF SYSTEMS:    CONSTITUTIONAL: No weakness, fevers or chills  EYES/ENT: No visual changes;  No vertigo or throat pain   NECK: No pain or stiffness  RESPIRATORY: No cough, wheezing, hemoptysis; No shortness of breath  CARDIOVASCULAR: No chest pain or palpitations  GASTROINTESTINAL: No abdominal or epigastric pain. No nausea, vomiting, or hematemesis; No diarrhea or constipation. No melena or hematochezia.  GENITOURINARY: No dysuria, frequency or hematuria  NEUROLOGICAL: No numbness or weakness  SKIN: No itching, rashes      ICU Vital Signs Last 24 Hrs  T(C): 36.8 (06 Dec 2023 19:00), Max: 36.9 (05 Dec 2023 23:00)  T(F): 98.3 (06 Dec 2023 19:00), Max: 98.5 (05 Dec 2023 23:00)  HR: 80 (06 Dec 2023 20:00) (71 - 86)  BP: --  BP(mean): --  ABP: --  ABP(mean): --  RR: 20 (06 Dec 2023 20:00) (15 - 35)  SpO2: 98% (06 Dec 2023 20:00) (97% - 100%)    O2 Parameters below as of 06 Dec 2023 20:00  Patient On (Oxygen Delivery Method): room air            CVP(mm Hg): --  CO: --  CI: --  PA: --  PA(mean): --  PA(direct): --  PCWP: --  LA: --  RA: --  SVR: --  SVRI: --  PVR: --  PVRI: --  I&O's Summary    05 Dec 2023 07:01  -  06 Dec 2023 07:00  --------------------------------------------------------  IN: 1356 mL / OUT: 2040 mL / NET: -684 mL    06 Dec 2023 07:01  -  06 Dec 2023 20:31  --------------------------------------------------------  IN: 924 mL / OUT: 1150 mL / NET: -226 mL        CAPILLARY BLOOD GLUCOSE    CAPILLARY BLOOD GLUCOSE      POCT Blood Glucose.: 118 mg/dL (06 Dec 2023 17:12)      PHYSICAL EXAM:  General: WN/WD NAD  HEENT: PERRLA, EOMI, moist mucous membranes  Neurology: A&Ox3, nonfocal, MOISE x 4  Respiratory: CTA B/L, normal respiratory effort, no wheezes, crackles, rales  CV: RRR, S1S2, no murmurs, rubs or gallops  Abdominal: Soft, NT, ND +BS, Last BM  Extremities: No edema, + peripheral pulses  Skin: bilateral upper extremity rash noted    ============================I/O===========================   I&O's Detail    05 Dec 2023 07:01  -  06 Dec 2023 07:00  --------------------------------------------------------  IN:    Heparin: 336 mL    IV PiggyBack: 500 mL    Oral Fluid: 520 mL  Total IN: 1356 mL    OUT:    Voided (mL): 2040 mL  Total OUT: 2040 mL    Total NET: -684 mL      06 Dec 2023 07:01  -  06 Dec 2023 20:31  --------------------------------------------------------  IN:    Heparin: 154 mL    Oral Fluid: 770 mL  Total IN: 924 mL    OUT:    Voided (mL): 1150 mL  Total OUT: 1150 mL    Total NET: -226 mL        ============================ LABS =========================                        12.1   7.86  )-----------( 135      ( 06 Dec 2023 01:49 )             36.1         132<L>  |  100  |  31<H>  ----------------------------<  119<H>  4.8   |  22  |  1.41<H>    Ca    10.4      06 Dec 2023 01:49  Phos  4.1       Mg     1.8         TPro  6.9  /  Alb  3.9  /  TBili  0.3  /  DBili  x   /  AST  43<H>  /  ALT  109<H>  /  AlkPhos  61  12                LIVER FUNCTIONS - ( 06 Dec 2023 01:49 )  Alb: 3.9 g/dL / Pro: 6.9 g/dL / ALK PHOS: 61 U/L / ALT: 109 U/L / AST: 43 U/L / GGT: x           PT/INR - ( 06 Dec 2023 01:49 )   PT: 12.4 sec;   INR: 1.13 ratio         PTT - ( 06 Dec 2023 01:49 )  PTT:67.1 sec    Lactate, Blood: 1.2 mmol/L (23 @ 01:49)  Lactate, Blood: 1.3 mmol/L (23 @ 02:41)    Urinalysis Basic - ( 06 Dec 2023 01:49 )    Color: x / Appearance: x / SG: x / pH: x  Gluc: 119 mg/dL / Ketone: x  / Bili: x / Urobili: x   Blood: x / Protein: x / Nitrite: x   Leuk Esterase: x / RBC: x / WBC x   Sq Epi: x / Non Sq Epi: x / Bacteria: x      ======================Micro/Rad/Cardio=================  Telemtry: Reviewed   EKG: Reviewed  CXR: Reviewed  Culture: Reviewed   Echo:   Cath: Cardiac Cath Lab - Adult:   [facility]  Department of Cardiology  [address] [place] [address]  [phone]  Cath Lab Report -- Comprehensive Report  Patient: LD VALENCIA  Study date: 2017  Account number: [id number]  MR number: [id number]  : 1965  Gender: Male  Race: W  Case Physician(s):  Jairon Holguin M.D.  Referring Physician:  Luc Lynn M.D.  INDICATIONS: Unstable angina - CCS4.  HISTORY: The patient has a history of coronary artery disease. The patient  hashypertension and medication-treated dyslipidemia.  PROCEDURE:  --  Left heart catheterization with ventriculography.  --  Left coronary angiography.  --  Right coronary angiography.  TECHNIQUE: The risks and alternatives of the procedures and conscious  sedation were explained to the patient and informed consent was obtained.  Cardiac catheterization performed electively.  Local anesthetic given. Right radial artery access. A 6FR PRELUDE KIT was  inserted in the vessel. Left heart catheterization. Ventriculography was  performed. A 5FR FR4.0 EXPO catheter was utilized. Left coronary artery  angiography. The vessel was injected utilizing a 5FR FL3.5 EXPO catheter.  Right coronary artery angiography. The vessel was injected utilizing a 5FR  FR4.0 EXPO catheter. RADIATION EXPOSURE: 1.1 min.  CONTRAST GIVEN: Omnipaque 55 ml.  MEDICATIONS GIVEN: Midazolam, 1 mg, IV. Fentanyl, 25 mcg, IV. Verapamil  (Isoptin, Calan, Covera), 2.5 mg, IA. Heparin, 3000 units, IA.  VENTRICLES: Global left ventricular function was moderately depressed. EF  estimated was 40 %.  CORONARY VESSELS: The coronary circulation is right dominant.  LM:   --  LM: Normal.  LAD:   --  Proximal LAD: There was a 50 % stenosis.  CX:   --  Circumflex: Normal.  RCA:   --  Mid RCA: There was a 40 % stenosis.  --  Distal RCA: There was a 50 % stenosis.  COMPLICATIONS: There were no complications.  DIAGNOSTIC RECOMMENDATIONS: The patient should continue with the present  medications.  Prepared and signed by  Jairon Holguin M.D.  Signed 2017 12:20:13  HEMODYNAMIC TABLES  Pressures:  Baseline/ Room Air  Pressures:  - HR: 78  Pressures:  - Rhythm:  Pressures:  -- Aortic Pressure (S/D/M): --/--/99  Pressures:  -- Left Ventricle (s/edp): 157/39/--  Outputs:  Baseline/ Room Air  Outputs:  -- CALCULATIONS[age]  Outputs:  -- CALCULATIONS: Body Surface Area: 2.05  Outputs:  -- CALCULATIONS: Height in cm: 175.00  Outputs:  -- CALCULATIONS: Sex: Male  Outputs:  -- CALCULATIONS: Weight in k.40 (17 @ 21:55)    ======================================================  PAST MEDICAL & SURGICAL HISTORY:  HTN (hypertension)      CAD (coronary artery disease)  ; stent      Intracranial hemorrhage        Respiratory arrest        Myocardial infarction, unspecified MI type, unspecified artery      History of coronary artery stent placement        ====================ASSESSMENT ==============  58 male with HTN, CAD (s/p PCI ), HFrEF, CVA , and T2DM presenting with chest pressure and unknown tachycardia that was shocked x1, [facility]  found to have in-stent restenosis of pLAD and  of RCA with elevated RA and PA pressures and severely decreased. Admitted to CICU for management of cardiogenic shock and ADHF requiring IABP  -, with hospital course c/b vfib arrest requiring reinsertion of IABP. Currently listed for transplant status 7 due to positive blood culture drawn on .    Plan:  ====================== NEUROLOGY=====================  Anxiety  - No Seroquel/antipsychotics since vfib arrest and prolonged QTC  - Psych Eval, recommended SSRI, but pt. refused   - Psych recommending atarax PRN  - Continue to monitor mental status    PT/Conditioning  - Continue band exercised while on bedrest s/t IABP    ==================== RESPIRATORY======================  Acute Hypoxemic Respiratory Failure  - s/p x2 intubations for cardiogenic pulm edema and the in setting of cardiac arrest, resolved - extubated 11/10  - Currently maintaining >95% sats on room air  - Continue incentive spirometry and monitoring of sp02    Asthma  - c/w albuterol, symbicort and spiriva  - On trelegy at home  - Continue to monitor SpO2 with goal >94%    ====================CARDIOVASCULAR==================  Vfib arrest i/s/o ischemia  - Lido gtt off   - PO Amio load - total of 5g per EP complete , continue PO amio  - Amio held for rising LFTs, continue to hold  - Keep K > 4, Mag > 2.2     Cardiogenic shock requiring IABP (- , -)  - Likely 2/2 NSTEMI and ADHF  -  LHC: pLAD 100 % in-stent restenosis & mRCA, 100 %. PCWP 30. IABP placed.  -  TTE: LV dilated. EF 32 %. Regional WMAs present, mod (grade 2) LV diastolic dysfunction  -  TTE: EF 22% and + LV thrombus  -  s/p 500 cc bolus  - IABP swapped  to RFA, continue 1:1 support  - Off Milrinone gtt @ 7:30 am   - James d/c'd due to elevated K levels  - c/w coreg 25 BID for GDMT  - HIT and HAIDER sent (12/3) as per HF   - back to status 7 on  due to positive growth in blood culture drawn on   -  - reduced hydralazine 100 TID to 75 TID and ISD 40 TID to 30 TID for AL reduction    NSTEMI iso stent re-occlusion of pLAD and 100%  of RCA  - EKG on admission w/ LBBB  - DAPT: c/w ASA, Brilinta d/c'd per transplant w/u  - c/w lipitor 80  - cMR deferred given necessity of IABP  - CT sx not recommending CABG, undergoing AT eval    LV thrombus  - c/w heparin gtt    ===================== RENAL =========================  Non-oliguric ARIC, resolved   - Baseline Cr: 1-  - Renal US: no evidence of renal artery stenosis  - Trend BMP, lytes daily, replace as needed  - Continue Strict I/Os, avoid nephrotoxins    Mild Hyponatremia/Hyperkalemia iso ARIC and or new CKD baseline  - Continue fluid restriction   - Urea powder d/c'd per renal  - Trend daily  - Continue monitoring urine output, lytes, SCr/ BUN  - Replete lytes prn with goal K >4 and Mg >2    =============== GASTROINTESTINAL===================  Constipation/ileus, resolved  - s/p multiple BMs, symptoms improving   - c/w diet    ===================ENDO====================  Type 2 DM  - A1c 8.3  - Continue lantus, premeal, low ISS  - continue FS    ===================HEMATOLOGIC/ONC ===================  - H/H & plts stable  - Monitor H/H and plts  - VTE PPX: heparin gtt    ==================INFECTIOUS DISEASE================  # Bacteremia  - Repeat BCx drawn  for leukocytosis to 12k - positive on , G+ rods in anaerobic bottle, suspect contaminant  - Repeat cultures x2 sent  per transplant ID recs - no growth on day 2  - Vancomycin by trough (- )  - Awaiting final microbial ID     # Enterococcus faecalis bacteremia, resolved  - BCx + for enterococcus faecalis x2, Staph epi x1 (likely contaminant)- pan sensitive   - Urine cx  + enterococcus faecalis  - BCx  no growth   - IABP site swapped to RFA   - s/p Vancomycin 1g q12h (-)  - CT A/P negative for infectious pathology    # COVID, resolved  - Off airborne precautions     # Pre-transplant ID w/u   - Trend ID recs for serologies   - Colonoscopy  - normal   - Chest CT  - improved LLL aeration  - s/p immunizations    ==================INFECTIOUS DISEASE================  # Upper Extremity Rash  - Patient developed bilateral upper extremity rash after receiving Hepatitis A & B immunizations on   - Dermatology consulted  - not likely to be related to vanco, can continue per ID recs  - Recommend triamcinolone cream, patient refusing topical therapies at this time  - RVP repeated to rule out viral etiology, negative    Patient requires continuous monitoring with bedside rhythm monitoring, pulse ox monitoring, and intermittent blood gas analysis. Care plan discussed with ICU care team. Patient remained critical and at risk for life threatening decompensation.  Patient seen, examined and plan discussed with CCU team during rounds.     I have personally provided ____ minutes of critical care time excluding time spent on separate procedures, in addition to initial critical care time provided by the CICU Attending, Dr. Lees.     By signing my name below, I, Sonia Preciado, attest that this documentation has been prepared under the direction and in the presence of Rita Hawthorne NP  Electronically signed: Rosalba Freitas, 23 @ 20:31    MINNIE, Rita Hawthorne NP, personally performed the services described in this documentation. all medical record entries made by the scribe were at my direction and in my presence. I have reviewed the chart and agree that the record reflects my personal performance and is accurate and complete  Electronically signed: Rita Hawthorne NP       DEVORAH VALENCIA  MRN-[id number]  Patient is a 58y old  Male who presents with a chief complaint of cardiogenic shock (06 Dec 2023 07:45)    HPI:  pt seen and approx 1:30 pm  in [facility]    59yo M w/ hx HTN, CAD w/ 1 stent in , ICH () presenting with abn ekg. Patient presented to [facility] where he was found to have STEMI, recommended to get cath however patient did not want to get it there so it left and came here.  Patient initially had cough, congestion, fever, was placed on antibiotics on .  Started feeling nauseous and had a presyncopal event after which he presented to ED last night.  Had chest pain as well.  Chest pain is midsternal.  Not currently having chest pain.  Received 4 aspirin 30 min pta. (2023 15:11)      Hospital Course:    24 HOUR EVENTS:    REVIEW OF SYSTEMS:    CONSTITUTIONAL: No weakness, fevers or chills  EYES/ENT: No visual changes;  No vertigo or throat pain   NECK: No pain or stiffness  RESPIRATORY: No cough, wheezing, hemoptysis; No shortness of breath  CARDIOVASCULAR: No chest pain or palpitations  GASTROINTESTINAL: No abdominal or epigastric pain. No nausea, vomiting, or hematemesis; No diarrhea or constipation. No melena or hematochezia.  GENITOURINARY: No dysuria, frequency or hematuria  NEUROLOGICAL: No numbness or weakness  SKIN: No itching, rashes      ICU Vital Signs Last 24 Hrs  T(C): 36.8 (06 Dec 2023 19:00), Max: 36.9 (05 Dec 2023 23:00)  T(F): 98.3 (06 Dec 2023 19:00), Max: 98.5 (05 Dec 2023 23:00)  HR: 80 (06 Dec 2023 20:00) (71 - 86)  BP: --  BP(mean): --  ABP: --  ABP(mean): --  RR: 20 (06 Dec 2023 20:00) (15 - 35)  SpO2: 98% (06 Dec 2023 20:00) (97% - 100%)    O2 Parameters below as of 06 Dec 2023 20:00  Patient On (Oxygen Delivery Method): room air            CVP(mm Hg): --  CO: --  CI: --  PA: --  PA(mean): --  PA(direct): --  PCWP: --  LA: --  RA: --  SVR: --  SVRI: --  PVR: --  PVRI: --  I&O's Summary    05 Dec 2023 07:01  -  06 Dec 2023 07:00  --------------------------------------------------------  IN: 1356 mL / OUT: 2040 mL / NET: -684 mL    06 Dec 2023 07:01  -  06 Dec 2023 20:31  --------------------------------------------------------  IN: 924 mL / OUT: 1150 mL / NET: -226 mL        CAPILLARY BLOOD GLUCOSE    CAPILLARY BLOOD GLUCOSE      POCT Blood Glucose.: 118 mg/dL (06 Dec 2023 17:12)      PHYSICAL EXAM:  General: WN/WD NAD  HEENT: PERRLA, EOMI, moist mucous membranes  Neurology: A&Ox3, nonfocal, MOISE x 4  Respiratory: CTA B/L, normal respiratory effort, no wheezes, crackles, rales  CV: RRR, S1S2, no murmurs, rubs or gallops  Abdominal: Soft, NT, ND +BS, Last BM  Extremities: No edema, + peripheral pulses  Skin: bilateral upper extremity rash noted    ============================I/O===========================   I&O's Detail    05 Dec 2023 07:01  -  06 Dec 2023 07:00  --------------------------------------------------------  IN:    Heparin: 336 mL    IV PiggyBack: 500 mL    Oral Fluid: 520 mL  Total IN: 1356 mL    OUT:    Voided (mL): 2040 mL  Total OUT: 2040 mL    Total NET: -684 mL      06 Dec 2023 07:01  -  06 Dec 2023 20:31  --------------------------------------------------------  IN:    Heparin: 154 mL    Oral Fluid: 770 mL  Total IN: 924 mL    OUT:    Voided (mL): 1150 mL  Total OUT: 1150 mL    Total NET: -226 mL        ============================ LABS =========================                        12.1   7.86  )-----------( 135      ( 06 Dec 2023 01:49 )             36.1         132<L>  |  100  |  31<H>  ----------------------------<  119<H>  4.8   |  22  |  1.41<H>    Ca    10.4      06 Dec 2023 01:49  Phos  4.1       Mg     1.8         TPro  6.9  /  Alb  3.9  /  TBili  0.3  /  DBili  x   /  AST  43<H>  /  ALT  109<H>  /  AlkPhos  61  12                LIVER FUNCTIONS - ( 06 Dec 2023 01:49 )  Alb: 3.9 g/dL / Pro: 6.9 g/dL / ALK PHOS: 61 U/L / ALT: 109 U/L / AST: 43 U/L / GGT: x           PT/INR - ( 06 Dec 2023 01:49 )   PT: 12.4 sec;   INR: 1.13 ratio         PTT - ( 06 Dec 2023 01:49 )  PTT:67.1 sec    Lactate, Blood: 1.2 mmol/L (23 @ 01:49)  Lactate, Blood: 1.3 mmol/L (23 @ 02:41)    Urinalysis Basic - ( 06 Dec 2023 01:49 )    Color: x / Appearance: x / SG: x / pH: x  Gluc: 119 mg/dL / Ketone: x  / Bili: x / Urobili: x   Blood: x / Protein: x / Nitrite: x   Leuk Esterase: x / RBC: x / WBC x   Sq Epi: x / Non Sq Epi: x / Bacteria: x      ======================Micro/Rad/Cardio=================  Telemtry: Reviewed   EKG: Reviewed  CXR: Reviewed  Culture: Reviewed   Echo:   Cath: Cardiac Cath Lab - Adult:   [facility]  Department of Cardiology  [address] [place] [address]  [phone]  Cath Lab Report -- Comprehensive Report  Patient: LD VALENCIA  Study date: 2017  Account number: [id number]  MR number: [id number]  : 1965  Gender: Male  Race: W  Case Physician(s):  Jairon Holguin M.D.  Referring Physician:  Luc Lynn M.D.  INDICATIONS: Unstable angina - CCS4.  HISTORY: The patient has a history of coronary artery disease. The patient  hashypertension and medication-treated dyslipidemia.  PROCEDURE:  --  Left heart catheterization with ventriculography.  --  Left coronary angiography.  --  Right coronary angiography.  TECHNIQUE: The risks and alternatives of the procedures and conscious  sedation were explained to the patient and informed consent was obtained.  Cardiac catheterization performed electively.  Local anesthetic given. Right radial artery access. A 6FR PRELUDE KIT was  inserted in the vessel. Left heart catheterization. Ventriculography was  performed. A 5FR FR4.0 EXPO catheter was utilized. Left coronary artery  angiography. The vessel was injected utilizing a 5FR FL3.5 EXPO catheter.  Right coronary artery angiography. The vessel was injected utilizing a 5FR  FR4.0 EXPO catheter. RADIATION EXPOSURE: 1.1 min.  CONTRAST GIVEN: Omnipaque 55 ml.  MEDICATIONS GIVEN: Midazolam, 1 mg, IV. Fentanyl, 25 mcg, IV. Verapamil  (Isoptin, Calan, Covera), 2.5 mg, IA. Heparin, 3000 units, IA.  VENTRICLES: Global left ventricular function was moderately depressed. EF  estimated was 40 %.  CORONARY VESSELS: The coronary circulation is right dominant.  LM:   --  LM: Normal.  LAD:   --  Proximal LAD: There was a 50 % stenosis.  CX:   --  Circumflex: Normal.  RCA:   --  Mid RCA: There was a 40 % stenosis.  --  Distal RCA: There was a 50 % stenosis.  COMPLICATIONS: There were no complications.  DIAGNOSTIC RECOMMENDATIONS: The patient should continue with the present  medications.  Prepared and signed by  Jairon Holguin M.D.  Signed 2017 12:20:13  HEMODYNAMIC TABLES  Pressures:  Baseline/ Room Air  Pressures:  - HR: 78  Pressures:  - Rhythm:  Pressures:  -- Aortic Pressure (S/D/M): --/--/99  Pressures:  -- Left Ventricle (s/edp): 157/39/--  Outputs:  Baseline/ Room Air  Outputs:  -- CALCULATIONS[age]  Outputs:  -- CALCULATIONS: Body Surface Area: 2.05  Outputs:  -- CALCULATIONS: Height in cm: 175.00  Outputs:  -- CALCULATIONS: Sex: Male  Outputs:  -- CALCULATIONS: Weight in k.40 (17 @ 21:55)    ======================================================  PAST MEDICAL & SURGICAL HISTORY:  HTN (hypertension)      CAD (coronary artery disease)  ; stent      Intracranial hemorrhage        Respiratory arrest        Myocardial infarction, unspecified MI type, unspecified artery      History of coronary artery stent placement        ====================ASSESSMENT ==============  58 male with HTN, CAD (s/p PCI ), HFrEF, CVA , and T2DM presenting with chest pressure and unknown tachycardia that was shocked x1, [facility]  found to have in-stent restenosis of pLAD and  of RCA with elevated RA and PA pressures and severely decreased. Admitted to CICU for management of cardiogenic shock and ADHF requiring IABP  -, with hospital course c/b vfib arrest requiring reinsertion of IABP. Currently listed for transplant status 7 due to positive blood culture drawn on .    Plan:  ====================== NEUROLOGY=====================  Anxiety  - No Seroquel/antipsychotics since vfib arrest and prolonged QTC  - Psych Eval, recommended SSRI, but pt. refused   - Psych recommending atarax PRN  - Continue to monitor mental status    PT/Conditioning  - Continue band exercised while on bedrest s/t IABP    ==================== RESPIRATORY======================  Acute Hypoxemic Respiratory Failure  - s/p x2 intubations for cardiogenic pulm edema and the in setting of cardiac arrest, resolved - extubated 11/10  - Currently maintaining >95% sats on room air  - Continue incentive spirometry and monitoring of sp02    Asthma  - c/w albuterol, symbicort and spiriva  - On trelegy at home  - Continue to monitor SpO2 with goal >94%    ====================CARDIOVASCULAR==================  Vfib arrest i/s/o ischemia  - Lido gtt off   - PO Amio load - total of 5g per EP complete , continue PO amio  - Amio held for rising LFTs, continue to hold  - Keep K > 4, Mag > 2.2     Cardiogenic shock requiring IABP (- , -)  - Likely 2/2 NSTEMI and ADHF  -  LHC: pLAD 100 % in-stent restenosis & mRCA, 100 %. PCWP 30. IABP placed.  -  TTE: LV dilated. EF 32 %. Regional WMAs present, mod (grade 2) LV diastolic dysfunction  -  TTE: EF 22% and + LV thrombus  -  s/p 500 cc bolus  - IABP swapped  to RFA, continue 1:1 support  - Off Milrinone gtt @ 7:30 am   - James d/c'd due to elevated K levels  - c/w coreg 25 BID for GDMT  - HIT and HAIDER sent (12/3) as per HF   - back to status 7 on  due to positive growth in blood culture drawn on   -  - reduced hydralazine 100 TID to 75 TID and ISD 40 TID to 30 TID for AL reduction    NSTEMI iso stent re-occlusion of pLAD and 100%  of RCA  - EKG on admission w/ LBBB  - DAPT: c/w ASA, Brilinta d/c'd per transplant w/u  - c/w lipitor 80  - cMR deferred given necessity of IABP  - CT sx not recommending CABG, undergoing AT eval    LV thrombus  - c/w heparin gtt    ===================== RENAL =========================  Non-oliguric ARIC, resolved   - Baseline Cr: 1-  - Renal US: no evidence of renal artery stenosis  - Trend BMP, lytes daily, replace as needed  - Continue Strict I/Os, avoid nephrotoxins    Mild Hyponatremia/Hyperkalemia iso ARIC and or new CKD baseline  - Continue fluid restriction   - Urea powder d/c'd per renal  - Trend daily  - Continue monitoring urine output, lytes, SCr/ BUN  - Replete lytes prn with goal K >4 and Mg >2    =============== GASTROINTESTINAL===================  Constipation/ileus, resolved  - s/p multiple BMs, symptoms improving   - c/w diet    ===================ENDO====================  Type 2 DM  - A1c 8.3  - Continue lantus, premeal, low ISS  - continue FS    ===================HEMATOLOGIC/ONC ===================  - H/H & plts stable  - Monitor H/H and plts  - VTE PPX: heparin gtt    ==================INFECTIOUS DISEASE================  # Bacteremia  - Repeat BCx drawn  for leukocytosis to 12k - positive on , G+ rods in anaerobic bottle, suspect contaminant  - Repeat cultures x2 sent  per transplant ID recs - no growth on day 2  - Vancomycin by trough (- )  - Awaiting final microbial ID     # Enterococcus faecalis bacteremia, resolved  - BCx + for enterococcus faecalis x2, Staph epi x1 (likely contaminant)- pan sensitive   - Urine cx  + enterococcus faecalis  - BCx  no growth   - IABP site swapped to RFA   - s/p Vancomycin 1g q12h (-)  - CT A/P negative for infectious pathology    # COVID, resolved  - Off airborne precautions     # Pre-transplant ID w/u   - Trend ID recs for serologies   - Colonoscopy  - normal   - Chest CT  - improved LLL aeration  - s/p immunizations    ==================INFECTIOUS DISEASE================  # Upper Extremity Rash  - Patient developed bilateral upper extremity rash after receiving Hepatitis A & B immunizations on   - Dermatology consulted  - not likely to be related to vanco, can continue per ID recs  - Recommend triamcinolone cream, patient refusing topical therapies at this time  - RVP repeated to rule out viral etiology, negative    Patient requires continuous monitoring with bedside rhythm monitoring, pulse ox monitoring, and intermittent blood gas analysis. Care plan discussed with ICU care team. Patient remained critical and at risk for life threatening decompensation.  Patient seen, examined and plan discussed with CCU team during rounds.     I have personally provided ____ minutes of critical care time excluding time spent on separate procedures, in addition to initial critical care time provided by the CICU Attending, Dr. Lees.     By signing my name below, I, Sonia Preciado, attest that this documentation has been prepared under the direction and in the presence of Rita Hawthorne NP  Electronically signed: Rosalba Freitas, 23 @ 20:31    MINNIE, Rita Hawthorne NP, personally performed the services described in this documentation. all medical record entries made by the scribe were at my direction and in my presence. I have reviewed the chart and agree that the record reflects my personal performance and is accurate and complete  Electronically signed: Rita Hawthorne NP       DEVORAH VALENCIA  MRN-[id number]  Patient is a 58y old  Male who presents with a chief complaint of cardiogenic shock (06 Dec 2023 07:45)    HPI: 58M w/ hx HTN, CAD w/ 1 stent in , ICH () presenting with abn ekg. Patient presented to [facility] where he was found to have STEMI, recommended to get cath however patient did not want to get it there so it left and came here.  Patient initially had cough, congestion, fever, was placed on antibiotics on .  Started feeling nauseous and had a presyncopal event after which he presented to ED last night.  Had chest pain as well.  Chest pain is midsternal.  Not currently having chest pain.  Received 4 aspirin 30 min pta. (2023 15:11)    REVIEW OF SYSTEMS:  CONSTITUTIONAL: No weakness, fevers or chills  EYES/ENT: No visual changes;  No vertigo or throat pain   NECK: No pain or stiffness  RESPIRATORY: No cough, wheezing, hemoptysis; No shortness of breath  CARDIOVASCULAR: No chest pain or palpitations  GASTROINTESTINAL: No abdominal or epigastric pain  No nausea, vomiting, or hematemesis; No diarrhea or constipation. No melena or hematochezia.  GENITOURINARY: No dysuria, frequency or hematuria  NEUROLOGICAL: No numbness or weakness  SKIN: No itching, rashes      ICU Vital Signs Last 24 Hrs  T(C): 36.8 (06 Dec 2023 19:00), Max: 36.9 (05 Dec 2023 23:00)  T(F): 98.3 (06 Dec 2023 19:00), Max: 98.5 (05 Dec 2023 23:00)  HR: 80 (06 Dec 2023 20:00) (71 - 86)  RR: 20 (06 Dec 2023 20:00) (15 - 35)  SpO2: 98% (06 Dec 2023 20:00) (97% - 100%)    O2 Parameters below as of 06 Dec 2023 20:00  Patient On (Oxygen Delivery Method): room air      I&O's Summary    05 Dec 2023 07:01  -  06 Dec 2023 07:00  --------------------------------------------------------  IN: 1356 mL / OUT: 2040 mL / NET: -684 mL    06 Dec 2023 07:01  -  06 Dec 2023 20:31  --------------------------------------------------------  IN: 924 mL / OUT: 1150 mL / NET: -226 mL      CAPILLARY BLOOD GLUCOSE  POCT Blood Glucose.: 118 mg/dL (06 Dec 2023 17:12)  ============================I/O===========================   I&O's Detail    05 Dec 2023 07:  -  06 Dec 2023 07:00  --------------------------------------------------------  IN:    Heparin: 336 mL    IV PiggyBack: 500 mL    Oral Fluid: 520 mL  Total IN: 1356 mL    OUT:    Voided (mL): 2040 mL  Total OUT: 2040 mL    Total NET: -684 mL      06 Dec 2023 07:01  -  06 Dec 2023 20:31  --------------------------------------------------------  IN:    Heparin: 154 mL    Oral Fluid: 770 mL  Total IN: 924 mL    OUT:    Voided (mL): 1150 mL  Total OUT: 1150 mL    Total NET: -226 mL  ============================ LABS =========================                      12.1   7.86  )-----------( 135      ( 06 Dec 2023 01:49 )             36.1         132<L>  |  100  |  31<H>  ----------------------------<  119<H>  4.8   |  22  |  1.41<H>    Ca    10.4      06 Dec 2023 01:49  Phos  4.1       Mg     1.8         TPro  6.9  /  Alb  3.9  /  TBili  0.3  /  DBili  x   /  AST  43<H>  /  ALT  109<H>  /  AlkPhos  61  12    LIVER FUNCTIONS - ( 06 Dec 2023 01:49 )  Alb: 3.9 g/dL / Pro: 6.9 g/dL / ALK PHOS: 61 U/L / ALT: 109 U/L / AST: 43 U/L / GGT: x           PT/INR - ( 06 Dec 2023 01:49 )   PT: 12.4 sec;   INR: 1.13 ratio    PTT - ( 06 Dec 2023 01:49 )  PTT:67.1 sec    Lactate, Blood: 1.2 mmol/L (23 @ 01:49)  Lactate, Blood: 1.3 mmol/L (23 @ 02:41)    Urinalysis Basic - ( 06 Dec 2023 01:49 )    Color: x / Appearance: x / SG: x / pH: x  Gluc: 119 mg/dL / Ketone: x  / Bili: x / Urobili: x   Blood: x / Protein: x / Nitrite: x   Leuk Esterase: x / RBC: x / WBC x   Sq Epi: x / Non Sq Epi: x / Bacteria: x  ======================Micro/Rad/Cardio=================  Telemtry: Reviewed   EKG: Reviewed  CXR: Reviewed  Culture: Reviewed   Echo:   Cath: Cardiac Cath Lab - Adult:   [facility]  Department of Cardiology  [address] [place] [address] [phone]  Cath Lab Report -- Comprehensive Report  Patient: LD VALENCIA  Study date: 2017  Account number: [id number]  MR number: [id number]  : 1965  Gender: Male  Race: W  Case Physician(s):  Jairon Holguin M.D.  Referring Physician:  Luc Lynn M.D.  INDICATIONS: Unstable angina - CCS4.  HISTORY: The patient has a history of coronary artery disease. The patient  hashypertension and medication-treated dyslipidemia.  PROCEDURE:  --  Left heart catheterization with ventriculography.  --  Left coronary angiography.  --  Right coronary angiography.  TECHNIQUE: The risks and alternatives of the procedures and conscious  sedation were explained to the patient and informed consent was obtained.  Cardiac catheterization performed electively.  Local anesthetic given. Right radial artery access. A 6FR PRELUDE KIT was  inserted in the vessel. Left heart catheterization. Ventriculography was  performed. A 5FR FR4.0 EXPO catheter was utilized. Left coronary artery  angiography. The vessel was injected utilizing a 5FR FL3.5 EXPO catheter.  Right coronary artery angiography. The vessel was injected utilizing a 5FR  FR4.0 EXPO catheter. RADIATION EXPOSURE: 1.1 min.  CONTRAST GIVEN: Omnipaque 55 ml.  MEDICATIONS GIVEN: Midazolam, 1 mg, IV. Fentanyl, 25 mcg, IV. Verapamil  (Isoptin, Calan, Covera), 2.5 mg, IA. Heparin, 3000 units, IA.  VENTRICLES: Global left ventricular function was moderately depressed. EF  estimated was 40 %.  CORONARY VESSELS: The coronary circulation is right dominant.  LM:   --  LM: Normal.  LAD:   --  Proximal LAD: There was a 50 % stenosis.  CX:   --  Circumflex: Normal.  RCA:   --  Mid RCA: There was a 40 % stenosis.  --  Distal RCA: There was a 50 % stenosis.  COMPLICATIONS: There were no complications.  DIAGNOSTIC RECOMMENDATIONS: The patient should continue with the present  medications.  Prepared and signed by  Jairon Holguin M.D.  Signed 2017 12:20:13  HEMODYNAMIC TABLES  Pressures:  Baseline/ Room Air  Pressures:  - HR: 78  Pressures:  - Rhythm:  Pressures:  -- Aortic Pressure (S/D/M): --/--/99  Pressures:  -- Left Ventricle (s/edp): 157/39/--  Outputs:  Baseline/ Room Air  Outputs:  -- CALCULATIONS[age]  Outputs:  -- CALCULATIONS: Body Surface Area: 2.05  Outputs:  -- CALCULATIONS: Height in cm: 175.00  Outputs:  -- CALCULATIONS: Sex: Male  Outputs:  -- CALCULATIONS: Weight in k.40 (17 @ 21:55)  ======================================================  PAST MEDICAL & SURGICAL HISTORY:  HTN (hypertension)      CAD (coronary artery disease)  ; stent      Intracranial hemorrhage        Respiratory arrest      Myocardial infarction, unspecified MI type, unspecified artery      History of coronary artery stent placement    ====================ASSESSMENT ==============  58 male with HTN, CAD (s/p PCI ), HFrEF, CVA , and T2DM presenting with chest pressure and unknown tachycardia that was shocked x1, C  found to have in-stent restenosis of pLAD and  of RCA with elevated RA and PA pressures and severely decreased. Admitted to CICU for management of cardiogenic shock and ADHF requiring IABP  -, with hospital course c/b vfib arrest requiring reinsertion of IABP. Currently listed for transplant status 7 due to positive blood culture drawn on .    Plan:  ====================== NEUROLOGY=====================  Anxiety  - No Seroquel/antipsychotics since vfib arrest and prolonged QTC  - Psych Eval, recommended SSRI, but pt. refused   - Psych recommending atarax PRN  - Continue to monitor mental status    PT/Conditioning  - Continue band exercised while on bedrest s/t IABP    ==================== RESPIRATORY======================  Acute Hypoxemic Respiratory Failure  - s/p x2 intubations for cardiogenic pulm edema and the in setting of cardiac arrest, resolved - extubated 11/10  - Currently maintaining >95% sats on room air  - Continue incentive spirometry and monitoring of sp02    Asthma  - c/w albuterol, symbicort and spiriva  - On trelegy at home  - Continue to monitor SpO2 with goal >94%    ====================CARDIOVASCULAR==================  Vfib arrest i/s/o ischemia  - Lido gtt off   - PO Amio load - total of 5g per EP complete , continue PO amio  - Amio held for rising LFTs, continue to hold  - Keep K > 4, Mag > 2.2     Cardiogenic shock requiring IABP (- , -)  - Likely 2/2 NSTEMI and ADHF  -  LHC: pLAD 100 % in-stent restenosis & mRCA, 100 %. PCWP 30. IABP placed.  -  TTE: LV dilated. EF 32 %. Regional WMAs present, mod (grade 2) LV diastolic dysfunction  -  TTE: EF 22% and + LV thrombus  -  s/p 500 cc bolus  - IABP swapped  to RFA, continue 1:1 support  - Off Milrinone gtt @ 7:30 am   - James d/c'd due to elevated K levels  - c/w coreg 25 BID for GDMT  - HIT and HAIDER sent (12/3) as per HF   - back to status 7 on  due to positive growth in blood culture drawn on   -  - reduced hydralazine 100 TID to 75 TID and ISD 40 TID to 30 TID for AL reduction    NSTEMI iso stent re-occlusion of pLAD and 100%  of RCA  - EKG on admission w/ LBBB  - DAPT: c/w ASA, Brilinta d/c'd per transplant w/u  - c/w lipitor 80  - cMR deferred given necessity of IABP  - CT sx not recommending CABG, undergoing AT eval    LV thrombus  - c/w heparin gtt    ===================== RENAL =========================  Non-oliguric ARIC, resolved   - Baseline Cr: 1-1  - Renal US: no evidence of renal artery stenosis  - Trend BMP, lytes daily, replace as needed  - Continue Strict I/Os, avoid nephrotoxins    Mild Hyponatremia/Hyperkalemia iso ARIC and or new CKD baseline  - Continue fluid restriction   - Urea powder d/c'd per renal  - Trend daily  - Continue monitoring urine output, lytes, SCr/ BUN  - Replete lytes prn with goal K >4 and Mg >2    =============== GASTROINTESTINAL===================  Constipation/ileus, resolved  - s/p multiple BMs, symptoms improving   - c/w diet    ===================ENDO====================  Type 2 DM  - A1c 8.3  - Continue lantus, premeal, low ISS  - continue FS    ===================HEMATOLOGIC/ONC ===================  - H/H & plts stable  - Monitor H/H and plts  - VTE PPX: heparin gtt    ==================INFECTIOUS DISEASE================  # Bacteremia  - Repeat BCx drawn  for leukocytosis to 12k - positive on , G+ rods in anaerobic bottle, suspect contaminant  - Repeat cultures x2 sent  per transplant ID recs - no growth on day 2  - Vancomycin by trough (- )  - Awaiting final microbial ID     # Enterococcus faecalis bacteremia, resolved  - BCx + for enterococcus faecalis x2, Staph epi x1 (likely contaminant)- pan sensitive   - Urine cx  + enterococcus faecalis  - BCx  no growth   - IABP site swapped to RFA   - s/p Vancomycin 1g q12h (-)  - CT A/P negative for infectious pathology    # COVID, resolved  - Off airborne precautions     # Pre-transplant ID w/u   - Trend ID recs for serologies   - Colonoscopy  - normal   - Chest CT  - improved LLL aeration  - s/p immunizations    ==================INFECTIOUS DISEASE================  # Upper Extremity Rash  - Patient developed bilateral upper extremity rash after receiving Hepatitis A & B immunizations on   - Dermatology consulted  - not likely to be related to vanco, can continue per ID recs  - Recommend triamcinolone cream, patient refusing topical therapies at this time  - RVP repeated to rule out viral etiology, negative    Patient requires continuous monitoring with bedside rhythm monitoring, pulse ox monitoring, and intermittent blood gas analysis. Care plan discussed with ICU care team. Patient remained critical and at risk for life threatening decompensation.  Patient seen, examined and plan discussed with CCU team during rounds.     I have personally provided 35 minutes of critical care time excluding time spent on separate procedures, in addition to initial critical care time provided by the CICU Attending, Dr. Lees.     By signing my name below, I, Sonia Preciado, attest that this documentation has been prepared under the direction and in the presence of Rita Hawthorne NP  Electronically signed: Sangita Freitas, 23 @ 20:31    I, Rita Hawthorne NP, personally performed the services described in this documentation. all medical record entries made by the sangita were at my direction and in my presence. I have reviewed the chart and agree that the record reflects my personal performance and is accurate and complete  Electronically signed: Rita Hawthorne NP   DEVORAH VALENCIA  MRN-[id number]  Patient is a 58y old  Male who presents with a chief complaint of cardiogenic shock (06 Dec 2023 07:45)    HPI: 58M w/ hx HTN, CAD w/ 1 stent in , ICH () presenting with abn ekg. Patient presented to [facility] where he was found to have STEMI, recommended to get cath however patient did not want to get it there so it left and came here.  Patient initially had cough, congestion, fever, was placed on antibiotics on .  Started feeling nauseous and had a presyncopal event after which he presented to ED last night.  Had chest pain as well.  Chest pain is midsternal.  Not currently having chest pain.  Received 4 aspirin 30 min pta. (2023 15:11)    REVIEW OF SYSTEMS:  CONSTITUTIONAL: No weakness, fevers or chills  EYES/ENT: No visual changes;  No vertigo or throat pain   NECK: No pain or stiffness  RESPIRATORY: No cough, wheezing, hemoptysis; No shortness of breath  CARDIOVASCULAR: No chest pain or palpitations  GASTROINTESTINAL: No abdominal or epigastric pain  No nausea, vomiting, or hematemesis; No diarrhea or constipation. No melena or hematochezia.  GENITOURINARY: No dysuria, frequency or hematuria  NEUROLOGICAL: No numbness or weakness  SKIN: No itching, rashes      ICU Vital Signs Last 24 Hrs  T(C): 36.8 (06 Dec 2023 19:00), Max: 36.9 (05 Dec 2023 23:00)  T(F): 98.3 (06 Dec 2023 19:00), Max: 98.5 (05 Dec 2023 23:00)  HR: 80 (06 Dec 2023 20:00) (71 - 86)  RR: 20 (06 Dec 2023 20:00) (15 - 35)  SpO2: 98% (06 Dec 2023 20:00) (97% - 100%)    O2 Parameters below as of 06 Dec 2023 20:00  Patient On (Oxygen Delivery Method): room air      I&O's Summary    05 Dec 2023 07:01  -  06 Dec 2023 07:00  --------------------------------------------------------  IN: 1356 mL / OUT: 2040 mL / NET: -684 mL    06 Dec 2023 07:01  -  06 Dec 2023 20:31  --------------------------------------------------------  IN: 924 mL / OUT: 1150 mL / NET: -226 mL      CAPILLARY BLOOD GLUCOSE  POCT Blood Glucose.: 118 mg/dL (06 Dec 2023 17:12)  ============================I/O===========================   I&O's Detail    05 Dec 2023 07:  -  06 Dec 2023 07:00  --------------------------------------------------------  IN:    Heparin: 336 mL    IV PiggyBack: 500 mL    Oral Fluid: 520 mL  Total IN: 1356 mL    OUT:    Voided (mL): 2040 mL  Total OUT: 2040 mL    Total NET: -684 mL      06 Dec 2023 07:01  -  06 Dec 2023 20:31  --------------------------------------------------------  IN:    Heparin: 154 mL    Oral Fluid: 770 mL  Total IN: 924 mL    OUT:    Voided (mL): 1150 mL  Total OUT: 1150 mL    Total NET: -226 mL  ============================ LABS =========================                      12.1   7.86  )-----------( 135      ( 06 Dec 2023 01:49 )             36.1         132<L>  |  100  |  31<H>  ----------------------------<  119<H>  4.8   |  22  |  1.41<H>    Ca    10.4      06 Dec 2023 01:49  Phos  4.1       Mg     1.8         TPro  6.9  /  Alb  3.9  /  TBili  0.3  /  DBili  x   /  AST  43<H>  /  ALT  109<H>  /  AlkPhos  61  12    LIVER FUNCTIONS - ( 06 Dec 2023 01:49 )  Alb: 3.9 g/dL / Pro: 6.9 g/dL / ALK PHOS: 61 U/L / ALT: 109 U/L / AST: 43 U/L / GGT: x           PT/INR - ( 06 Dec 2023 01:49 )   PT: 12.4 sec;   INR: 1.13 ratio    PTT - ( 06 Dec 2023 01:49 )  PTT:67.1 sec    Lactate, Blood: 1.2 mmol/L (23 @ 01:49)  Lactate, Blood: 1.3 mmol/L (23 @ 02:41)    Urinalysis Basic - ( 06 Dec 2023 01:49 )    Color: x / Appearance: x / SG: x / pH: x  Gluc: 119 mg/dL / Ketone: x  / Bili: x / Urobili: x   Blood: x / Protein: x / Nitrite: x   Leuk Esterase: x / RBC: x / WBC x   Sq Epi: x / Non Sq Epi: x / Bacteria: x  ======================Micro/Rad/Cardio=================  Telemtry: Reviewed   EKG: Reviewed  CXR: Reviewed  Culture: Reviewed   Echo:   Cath: Cardiac Cath Lab - Adult:   [facility]  Department of Cardiology  [address] [place] [address] [phone]  Cath Lab Report -- Comprehensive Report  Patient: LD VALENCIA  Study date: 2017  Account number: [id number]  MR number: [id number]  : 1965  Gender: Male  Race: W  Case Physician(s):  Jairon Holguin M.D.  Referring Physician:  Luc Lynn M.D.  INDICATIONS: Unstable angina - CCS4.  HISTORY: The patient has a history of coronary artery disease. The patient  hashypertension and medication-treated dyslipidemia.  PROCEDURE:  --  Left heart catheterization with ventriculography.  --  Left coronary angiography.  --  Right coronary angiography.  TECHNIQUE: The risks and alternatives of the procedures and conscious  sedation were explained to the patient and informed consent was obtained.  Cardiac catheterization performed electively.  Local anesthetic given. Right radial artery access. A 6FR PRELUDE KIT was  inserted in the vessel. Left heart catheterization. Ventriculography was  performed. A 5FR FR4.0 EXPO catheter was utilized. Left coronary artery  angiography. The vessel was injected utilizing a 5FR FL3.5 EXPO catheter.  Right coronary artery angiography. The vessel was injected utilizing a 5FR  FR4.0 EXPO catheter. RADIATION EXPOSURE: 1.1 min.  CONTRAST GIVEN: Omnipaque 55 ml.  MEDICATIONS GIVEN: Midazolam, 1 mg, IV. Fentanyl, 25 mcg, IV. Verapamil  (Isoptin, Calan, Covera), 2.5 mg, IA. Heparin, 3000 units, IA.  VENTRICLES: Global left ventricular function was moderately depressed. EF  estimated was 40 %.  CORONARY VESSELS: The coronary circulation is right dominant.  LM:   --  LM: Normal.  LAD:   --  Proximal LAD: There was a 50 % stenosis.  CX:   --  Circumflex: Normal.  RCA:   --  Mid RCA: There was a 40 % stenosis.  --  Distal RCA: There was a 50 % stenosis.  COMPLICATIONS: There were no complications.  DIAGNOSTIC RECOMMENDATIONS: The patient should continue with the present  medications.  Prepared and signed by  Jairon Holguin M.D.  Signed 2017 12:20:13  HEMODYNAMIC TABLES  Pressures:  Baseline/ Room Air  Pressures:  - HR: 78  Pressures:  - Rhythm:  Pressures:  -- Aortic Pressure (S/D/M): --/--/99  Pressures:  -- Left Ventricle (s/edp): 157/39/--  Outputs:  Baseline/ Room Air  Outputs:  -- CALCULATIONS[age]  Outputs:  -- CALCULATIONS: Body Surface Area: 2.05  Outputs:  -- CALCULATIONS: Height in cm: 175.00  Outputs:  -- CALCULATIONS: Sex: Male  Outputs:  -- CALCULATIONS: Weight in k.40 (17 @ 21:55)  ======================================================  PAST MEDICAL & SURGICAL HISTORY:  HTN (hypertension)      CAD (coronary artery disease)  ; stent      Intracranial hemorrhage        Respiratory arrest      Myocardial infarction, unspecified MI type, unspecified artery      History of coronary artery stent placement    ====================ASSESSMENT ==============  58 male with HTN, CAD (s/p PCI ), HFrEF, CVA , and T2DM presenting with chest pressure and unknown tachycardia that was shocked x1, C  found to have in-stent restenosis of pLAD and  of RCA with elevated RA and PA pressures and severely decreased. Admitted to CICU for management of cardiogenic shock and ADHF requiring IABP  -, with hospital course c/b vfib arrest requiring reinsertion of IABP. Currently listed for transplant status 7 due to positive blood culture drawn on .    Plan:  ====================== NEUROLOGY=====================  Anxiety  - No Seroquel/antipsychotics since vfib arrest and prolonged QTC  - Psych Eval, recommended SSRI, but pt. refused   - Psych recommending atarax PRN  - Continue to monitor mental status    PT/Conditioning  - Continue band exercised while on bedrest s/t IABP    ==================== RESPIRATORY======================  Acute Hypoxemic Respiratory Failure  - s/p x2 intubations for cardiogenic pulm edema and the in setting of cardiac arrest, resolved - extubated 11/10  - Currently maintaining >95% sats on room air  - Continue incentive spirometry and monitoring of sp02    Asthma  - c/w albuterol, symbicort and spiriva  - On trelegy at home  - Continue to monitor SpO2 with goal >94%    ====================CARDIOVASCULAR==================  Vfib arrest i/s/o ischemia  - Lido gtt off   - PO Amio load - total of 5g per EP complete , continue PO amio  - Amio held for rising LFTs, continue to hold  - Keep K > 4, Mag > 2.2     Cardiogenic shock requiring IABP (- , -)  - Likely 2/2 NSTEMI and ADHF  -  LHC: pLAD 100 % in-stent restenosis & mRCA, 100 %. PCWP 30. IABP placed.  -  TTE: LV dilated. EF 32 %. Regional WMAs present, mod (grade 2) LV diastolic dysfunction  -  TTE: EF 22% and + LV thrombus  -  s/p 500 cc bolus  - IABP swapped  to RFA, continue 1:1 support  - Off Milrinone gtt @ 7:30 am   - James d/c'd due to elevated K levels  - c/w coreg 25 BID for GDMT  - HIT and HAIDER sent (12/3) as per HF   - back to status 7 on  due to positive growth in blood culture drawn on   -  - reduced hydralazine 100 TID to 75 TID and ISD 40 TID to 30 TID for AL reduction    NSTEMI iso stent re-occlusion of pLAD and 100%  of RCA  - EKG on admission w/ LBBB  - DAPT: c/w ASA, Brilinta d/c'd per transplant w/u  - c/w lipitor 80  - cMR deferred given necessity of IABP  - CT sx not recommending CABG, undergoing AT eval    LV thrombus  - c/w heparin gtt    ===================== RENAL =========================  Non-oliguric ARIC, resolved   - Baseline Cr: 1-1  - Renal US: no evidence of renal artery stenosis  - Trend BMP, lytes daily, replace as needed  - Continue Strict I/Os, avoid nephrotoxins    Mild Hyponatremia/Hyperkalemia iso ARIC and or new CKD baseline  - Continue fluid restriction   - Urea powder d/c'd per renal  - Trend daily  - Continue monitoring urine output, lytes, SCr/ BUN  - Replete lytes prn with goal K >4 and Mg >2    =============== GASTROINTESTINAL===================  Constipation/ileus, resolved  - s/p multiple BMs, symptoms improving   - c/w diet    ===================ENDO====================  Type 2 DM  - A1c 8.3  - Continue lantus, premeal, low ISS  - continue FS    ===================HEMATOLOGIC/ONC ===================  - H/H & plts stable  - Monitor H/H and plts  - VTE PPX: heparin gtt    ==================INFECTIOUS DISEASE================  # Bacteremia  - Repeat BCx drawn  for leukocytosis to 12k - positive on , G+ rods in anaerobic bottle, suspect contaminant  - Repeat cultures x2 sent  per transplant ID recs - no growth on day 2  - Vancomycin by trough (- )  - Awaiting final microbial ID     # Enterococcus faecalis bacteremia, resolved  - BCx + for enterococcus faecalis x2, Staph epi x1 (likely contaminant)- pan sensitive   - Urine cx  + enterococcus faecalis  - BCx  no growth   - IABP site swapped to RFA   - s/p Vancomycin 1g q12h (-)  - CT A/P negative for infectious pathology    # COVID, resolved  - Off airborne precautions     # Pre-transplant ID w/u   - Trend ID recs for serologies   - Colonoscopy  - normal   - Chest CT  - improved LLL aeration  - s/p immunizations    ==================INFECTIOUS DISEASE================  # Upper Extremity Rash  - Patient developed bilateral upper extremity rash after receiving Hepatitis A & B immunizations on   - Dermatology consulted  - not likely to be related to vanco, can continue per ID recs  - Recommend triamcinolone cream, patient refusing topical therapies at this time  - RVP repeated to rule out viral etiology, negative    Patient requires continuous monitoring with bedside rhythm monitoring, pulse ox monitoring, and intermittent blood gas analysis. Care plan discussed with ICU care team. Patient remained critical and at risk for life threatening decompensation.  Patient seen, examined and plan discussed with CCU team during rounds.     I have personally provided 35 minutes of critical care time excluding time spent on separate procedures, in addition to initial critical care time provided by the CICU Attending, Dr. Lees.     By signing my name below, I, Sonia Preciado, attest that this documentation has been prepared under the direction and in the presence of Rita Hawthorne NP  Electronically signed: Sangita Freitas, 23 @ 20:31    I, Rita Hawthorne NP, personally performed the services described in this documentation. all medical record entries made by the sangita were at my direction and in my presence. I have reviewed the chart and agree that the record reflects my personal performance and is accurate and complete  Electronically signed: Rita Hawthorne NP

## 2023-12-06 NOTE — ASU PATIENT PROFILE, ADULT - PATIENT KNOW
previous_has_had_botox Additional History: Patient would like to discuss adding more Botox to glabella and forehead When Was Your Last Botox Treatment?: 09/01/2023 yes

## 2023-12-06 NOTE — PROGRESS NOTE ADULT - ASSESSMENT
59 yo M with HFrEF (LVIDd 6.4 cm, LVEF 32%), CAD s/p PCI (2008), HTN, DMT2 (A1c 8.3%) and CVA s/p TPA (2018), recently treated for PNA who initially presented to [facility] via EMS after syncope reportedly requiring defibrilation. Treated for ACS but left AMA to come to [facility]. On arrival ECG with ST depression in lateral leads. Intubated 11/1 for respiratory failure. Found to have COVID. L/RHC 11/1revealing  of LAD and RCA, elevated filling pressures and CI of 1.5 prompting placement of IABP. Extubated 11/3. IABP was weaned to off 11/7, then the following day on 11/8, developed PMVT cardiac arrest with prompt CPR and defibrillation, started on IV Lidocaine and Amio. IABP ultimately replaced on 11/9 for worsening hypotension. TTE 11/11 revealing LV thrombus.     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 AT evaluation launched 11/10, he is ABO A. He was discussed during TSC on 11/16 and was approved for listing, however was found to be Bacteremic with 11/17 Blc Cx growing mixed cultures Staph epi and Enterococcus faecalis and started on IV Vanco. Repeat cultures from 11/18 reveal NGTD and therefore was cleared by ID for listing.     He appears adequately supported on IABP at 1:1, electrically quiescent on oral Amiodarone. Cr is improving with holding diuretics. Labs otherwise notable for worsening thrombocytopenia. Awaiting suitable donor. Now with GM + rods in blood culture on 11/30 (reported on 12/4), restarted on vancomycin, and status 7, pending repeat cultures. c/b bilat UE pruritic rash.        Cardiac Studies  11/21/23 TTE: limited unable to rule out endocarditis, technically difficult study  11/1123 TTE: LVEF 22% (global), LV thrombus, normal RV size and function, mild MR, trace TR  11/1/23  LHC showed pLAD 70 % stenosis in the ostium portion of the segment and 100 % in-stent restenosis and in mRCA, 100 % stenosis.   11/1/23 RHC; RA 23 Vw 24, PA 52/33/40, PCWP 30 Vw 33, AO 85/66/73, PA sat 54.4%, CO/CI (F) 2.96/1.44, IABP was placed during the cath through R common femoral artery.   11/1/23 TTE: LVIDd 6.4cm , LVEF 32%, regional WMA, grade II DD,  TAPSE 1.7cm, mld MR, trace TR,     Bedside hemodynamics  11/25 IABP 1:1: CVP 3, PA  33-36/12 mean PA 20-23 PCWP 15-16, MVO2 72.0%, CO/CI 6.2/2.8,   dsc? (PVR 0.8-1.1 medina calculated by me)   11/3: IABP 1:1 RA 5; PA 27/12/18; CO/CI 5.6/2.6; MAP 90-100s.  11/4/23 IABP 1:3 CVP 4 PA 37/18 SvO2 83.8 CO/CI 11.2 CI 5.1  (in the setting of fever to 38.3C)  11/5 IABP 1:1 CVP 3 PA 48/27 SvO2 78.4% CO 8.2 CI 3.7   11/1 (IABP 1:1): CVP 1, PA 33/5/16, MvO2 74.3% 57 yo M with HFrEF (LVIDd 6.4 cm, LVEF 32%), CAD s/p PCI (2008), HTN, DMT2 (A1c 8.3%) and CVA s/p TPA (2018), recently treated for PNA who initially presented to [facility] via EMS after syncope reportedly requiring defibrilation. Treated for ACS but left AMA to come to [facility]. On arrival ECG with ST depression in lateral leads. Intubated 11/1 for respiratory failure. Found to have COVID. L/RHC 11/1revealing  of LAD and RCA, elevated filling pressures and CI of 1.5 prompting placement of IABP. Extubated 11/3. IABP was weaned to off 11/7, then the following day on 11/8, developed PMVT cardiac arrest with prompt CPR and defibrillation, started on IV Lidocaine and Amio. IABP ultimately replaced on 11/9 for worsening hypotension. TTE 11/11 revealing LV thrombus.     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 AT evaluation launched 11/10, he is ABO A. He was discussed during TSC on 11/16 and was approved for listing, however was found to be Bacteremic with 11/17 Blc Cx growing mixed cultures Staph epi and Enterococcus faecalis and started on IV Vanco. Repeat cultures from 11/18 reveal NGTD and therefore was cleared by ID for listing.     He appears adequately supported on IABP at 1:1, electrically quiescent on oral Amiodarone. Cr is improving with holding diuretics. Labs otherwise notable for worsening thrombocytopenia. Awaiting suitable donor. Now with GM + rods in blood culture on 11/30 (reported on 12/4), restarted on vancomycin, and status 7, pending repeat cultures. c/b bilat UE pruritic rash.        Cardiac Studies  11/21/23 TTE: limited unable to rule out endocarditis, technically difficult study  11/1123 TTE: LVEF 22% (global), LV thrombus, normal RV size and function, mild MR, trace TR  11/1/23  LHC showed pLAD 70 % stenosis in the ostium portion of the segment and 100 % in-stent restenosis and in mRCA, 100 % stenosis.   11/1/23 RHC; RA 23 Vw 24, PA 52/33/40, PCWP 30 Vw 33, AO 85/66/73, PA sat 54.4%, CO/CI (F) 2.96/1.44, IABP was placed during the cath through R common femoral artery.   11/1/23 TTE: LVIDd 6.4cm , LVEF 32%, regional WMA, grade II DD,  TAPSE 1.7cm, mld MR, trace TR,     Bedside hemodynamics  11/25 IABP 1:1: CVP 3, PA  33-36/12 mean PA 20-23 PCWP 15-16, MVO2 72.0%, CO/CI 6.2/2.8,   dsc? (PVR 0.8-1.1 medina calculated by me)   11/3: IABP 1:1 RA 5; PA 27/12/18; CO/CI 5.6/2.6; MAP 90-100s.  11/4/23 IABP 1:3 CVP 4 PA 37/18 SvO2 83.8 CO/CI 11.2 CI 5.1  (in the setting of fever to 38.3C)  11/5 IABP 1:1 CVP 3 PA 48/27 SvO2 78.4% CO 8.2 CI 3.7   11/1 (IABP 1:1): CVP 1, PA 33/5/16, MvO2 74.3%

## 2023-12-07 LAB
ALBUMIN SERPL ELPH-MCNC: 3.9 G/DL — SIGNIFICANT CHANGE UP (ref 3.3–5)
ALBUMIN SERPL ELPH-MCNC: 3.9 G/DL — SIGNIFICANT CHANGE UP (ref 3.3–5)
ALP SERPL-CCNC: 63 U/L — SIGNIFICANT CHANGE UP (ref 40–120)
ALP SERPL-CCNC: 63 U/L — SIGNIFICANT CHANGE UP (ref 40–120)
ALT FLD-CCNC: 117 U/L — HIGH (ref 10–45)
ALT FLD-CCNC: 117 U/L — HIGH (ref 10–45)
ANION GAP SERPL CALC-SCNC: 11 MMOL/L — SIGNIFICANT CHANGE UP (ref 5–17)
ANION GAP SERPL CALC-SCNC: 11 MMOL/L — SIGNIFICANT CHANGE UP (ref 5–17)
APTT BLD: 67.3 SEC — HIGH (ref 24.5–35.6)
APTT BLD: 67.3 SEC — HIGH (ref 24.5–35.6)
AST SERPL-CCNC: 42 U/L — HIGH (ref 10–40)
AST SERPL-CCNC: 42 U/L — HIGH (ref 10–40)
BASOPHILS # BLD AUTO: 0.04 K/UL — SIGNIFICANT CHANGE UP (ref 0–0.2)
BASOPHILS # BLD AUTO: 0.04 K/UL — SIGNIFICANT CHANGE UP (ref 0–0.2)
BASOPHILS NFR BLD AUTO: 0.5 % — SIGNIFICANT CHANGE UP (ref 0–2)
BASOPHILS NFR BLD AUTO: 0.5 % — SIGNIFICANT CHANGE UP (ref 0–2)
BILIRUB SERPL-MCNC: 0.3 MG/DL — SIGNIFICANT CHANGE UP (ref 0.2–1.2)
BILIRUB SERPL-MCNC: 0.3 MG/DL — SIGNIFICANT CHANGE UP (ref 0.2–1.2)
BLD GP AB SCN SERPL QL: NEGATIVE — SIGNIFICANT CHANGE UP
BLD GP AB SCN SERPL QL: NEGATIVE — SIGNIFICANT CHANGE UP
BUN SERPL-MCNC: 35 MG/DL — HIGH (ref 7–23)
BUN SERPL-MCNC: 35 MG/DL — HIGH (ref 7–23)
CALCIUM SERPL-MCNC: 10.4 MG/DL — SIGNIFICANT CHANGE UP (ref 8.4–10.5)
CALCIUM SERPL-MCNC: 10.4 MG/DL — SIGNIFICANT CHANGE UP (ref 8.4–10.5)
CHLORIDE SERPL-SCNC: 103 MMOL/L — SIGNIFICANT CHANGE UP (ref 96–108)
CHLORIDE SERPL-SCNC: 103 MMOL/L — SIGNIFICANT CHANGE UP (ref 96–108)
CO2 SERPL-SCNC: 21 MMOL/L — LOW (ref 22–31)
CO2 SERPL-SCNC: 21 MMOL/L — LOW (ref 22–31)
CREAT SERPL-MCNC: 1.35 MG/DL — HIGH (ref 0.5–1.3)
CREAT SERPL-MCNC: 1.35 MG/DL — HIGH (ref 0.5–1.3)
EGFR: 60 ML/MIN/1.73M2 — SIGNIFICANT CHANGE UP
EGFR: 60 ML/MIN/1.73M2 — SIGNIFICANT CHANGE UP
EOSINOPHIL # BLD AUTO: 0.55 K/UL — HIGH (ref 0–0.5)
EOSINOPHIL # BLD AUTO: 0.55 K/UL — HIGH (ref 0–0.5)
EOSINOPHIL NFR BLD AUTO: 6.7 % — HIGH (ref 0–6)
EOSINOPHIL NFR BLD AUTO: 6.7 % — HIGH (ref 0–6)
GLUCOSE BLDC GLUCOMTR-MCNC: 139 MG/DL — HIGH (ref 70–99)
GLUCOSE BLDC GLUCOMTR-MCNC: 139 MG/DL — HIGH (ref 70–99)
GLUCOSE BLDC GLUCOMTR-MCNC: 170 MG/DL — HIGH (ref 70–99)
GLUCOSE BLDC GLUCOMTR-MCNC: 170 MG/DL — HIGH (ref 70–99)
GLUCOSE BLDC GLUCOMTR-MCNC: 180 MG/DL — HIGH (ref 70–99)
GLUCOSE BLDC GLUCOMTR-MCNC: 180 MG/DL — HIGH (ref 70–99)
GLUCOSE BLDC GLUCOMTR-MCNC: 202 MG/DL — HIGH (ref 70–99)
GLUCOSE BLDC GLUCOMTR-MCNC: 202 MG/DL — HIGH (ref 70–99)
GLUCOSE SERPL-MCNC: 149 MG/DL — HIGH (ref 70–99)
GLUCOSE SERPL-MCNC: 149 MG/DL — HIGH (ref 70–99)
HCT VFR BLD CALC: 36.9 % — LOW (ref 39–50)
HCT VFR BLD CALC: 36.9 % — LOW (ref 39–50)
HGB BLD-MCNC: 12 G/DL — LOW (ref 13–17)
HGB BLD-MCNC: 12 G/DL — LOW (ref 13–17)
IMM GRANULOCYTES NFR BLD AUTO: 0.5 % — SIGNIFICANT CHANGE UP (ref 0–0.9)
IMM GRANULOCYTES NFR BLD AUTO: 0.5 % — SIGNIFICANT CHANGE UP (ref 0–0.9)
INR BLD: 1.09 RATIO — SIGNIFICANT CHANGE UP (ref 0.85–1.18)
INR BLD: 1.09 RATIO — SIGNIFICANT CHANGE UP (ref 0.85–1.18)
LACTATE SERPL-SCNC: 1.7 MMOL/L — SIGNIFICANT CHANGE UP (ref 0.5–2)
LACTATE SERPL-SCNC: 1.7 MMOL/L — SIGNIFICANT CHANGE UP (ref 0.5–2)
LYMPHOCYTES # BLD AUTO: 1.34 K/UL — SIGNIFICANT CHANGE UP (ref 1–3.3)
LYMPHOCYTES # BLD AUTO: 1.34 K/UL — SIGNIFICANT CHANGE UP (ref 1–3.3)
LYMPHOCYTES # BLD AUTO: 16.4 % — SIGNIFICANT CHANGE UP (ref 13–44)
LYMPHOCYTES # BLD AUTO: 16.4 % — SIGNIFICANT CHANGE UP (ref 13–44)
MAGNESIUM SERPL-MCNC: 2 MG/DL — SIGNIFICANT CHANGE UP (ref 1.6–2.6)
MAGNESIUM SERPL-MCNC: 2 MG/DL — SIGNIFICANT CHANGE UP (ref 1.6–2.6)
MCHC RBC-ENTMCNC: 29.3 PG — SIGNIFICANT CHANGE UP (ref 27–34)
MCHC RBC-ENTMCNC: 29.3 PG — SIGNIFICANT CHANGE UP (ref 27–34)
MCHC RBC-ENTMCNC: 32.5 GM/DL — SIGNIFICANT CHANGE UP (ref 32–36)
MCHC RBC-ENTMCNC: 32.5 GM/DL — SIGNIFICANT CHANGE UP (ref 32–36)
MCV RBC AUTO: 90 FL — SIGNIFICANT CHANGE UP (ref 80–100)
MCV RBC AUTO: 90 FL — SIGNIFICANT CHANGE UP (ref 80–100)
MONOCYTES # BLD AUTO: 0.66 K/UL — SIGNIFICANT CHANGE UP (ref 0–0.9)
MONOCYTES # BLD AUTO: 0.66 K/UL — SIGNIFICANT CHANGE UP (ref 0–0.9)
MONOCYTES NFR BLD AUTO: 8.1 % — SIGNIFICANT CHANGE UP (ref 2–14)
MONOCYTES NFR BLD AUTO: 8.1 % — SIGNIFICANT CHANGE UP (ref 2–14)
NEUTROPHILS # BLD AUTO: 5.55 K/UL — SIGNIFICANT CHANGE UP (ref 1.8–7.4)
NEUTROPHILS # BLD AUTO: 5.55 K/UL — SIGNIFICANT CHANGE UP (ref 1.8–7.4)
NEUTROPHILS NFR BLD AUTO: 67.8 % — SIGNIFICANT CHANGE UP (ref 43–77)
NEUTROPHILS NFR BLD AUTO: 67.8 % — SIGNIFICANT CHANGE UP (ref 43–77)
NRBC # BLD: 0 /100 WBCS — SIGNIFICANT CHANGE UP (ref 0–0)
NRBC # BLD: 0 /100 WBCS — SIGNIFICANT CHANGE UP (ref 0–0)
PHOSPHATE SERPL-MCNC: 3.5 MG/DL — SIGNIFICANT CHANGE UP (ref 2.5–4.5)
PHOSPHATE SERPL-MCNC: 3.5 MG/DL — SIGNIFICANT CHANGE UP (ref 2.5–4.5)
PLATELET # BLD AUTO: 140 K/UL — LOW (ref 150–400)
PLATELET # BLD AUTO: 140 K/UL — LOW (ref 150–400)
POTASSIUM SERPL-MCNC: 4.9 MMOL/L — SIGNIFICANT CHANGE UP (ref 3.5–5.3)
POTASSIUM SERPL-MCNC: 4.9 MMOL/L — SIGNIFICANT CHANGE UP (ref 3.5–5.3)
POTASSIUM SERPL-SCNC: 4.9 MMOL/L — SIGNIFICANT CHANGE UP (ref 3.5–5.3)
POTASSIUM SERPL-SCNC: 4.9 MMOL/L — SIGNIFICANT CHANGE UP (ref 3.5–5.3)
PROT SERPL-MCNC: 7 G/DL — SIGNIFICANT CHANGE UP (ref 6–8.3)
PROT SERPL-MCNC: 7 G/DL — SIGNIFICANT CHANGE UP (ref 6–8.3)
PROTHROM AB SERPL-ACNC: 11.4 SEC — SIGNIFICANT CHANGE UP (ref 9.5–13)
PROTHROM AB SERPL-ACNC: 11.4 SEC — SIGNIFICANT CHANGE UP (ref 9.5–13)
RBC # BLD: 4.1 M/UL — LOW (ref 4.2–5.8)
RBC # BLD: 4.1 M/UL — LOW (ref 4.2–5.8)
RBC # FLD: 15.4 % — HIGH (ref 10.3–14.5)
RBC # FLD: 15.4 % — HIGH (ref 10.3–14.5)
RH IG SCN BLD-IMP: POSITIVE — SIGNIFICANT CHANGE UP
RH IG SCN BLD-IMP: POSITIVE — SIGNIFICANT CHANGE UP
SODIUM SERPL-SCNC: 135 MMOL/L — SIGNIFICANT CHANGE UP (ref 135–145)
SODIUM SERPL-SCNC: 135 MMOL/L — SIGNIFICANT CHANGE UP (ref 135–145)
WBC # BLD: 8.18 K/UL — SIGNIFICANT CHANGE UP (ref 3.8–10.5)
WBC # BLD: 8.18 K/UL — SIGNIFICANT CHANGE UP (ref 3.8–10.5)
WBC # FLD AUTO: 8.18 K/UL — SIGNIFICANT CHANGE UP (ref 3.8–10.5)
WBC # FLD AUTO: 8.18 K/UL — SIGNIFICANT CHANGE UP (ref 3.8–10.5)

## 2023-12-07 PROCEDURE — 71045 X-RAY EXAM CHEST 1 VIEW: CPT | Mod: 26

## 2023-12-07 PROCEDURE — 93010 ELECTROCARDIOGRAM REPORT: CPT

## 2023-12-07 PROCEDURE — 90832 PSYTX W PT 30 MINUTES: CPT

## 2023-12-07 PROCEDURE — 99232 SBSQ HOSP IP/OBS MODERATE 35: CPT

## 2023-12-07 PROCEDURE — 99292 CRITICAL CARE ADDL 30 MIN: CPT

## 2023-12-07 PROCEDURE — 99291 CRITICAL CARE FIRST HOUR: CPT

## 2023-12-07 PROCEDURE — 99291 CRITICAL CARE FIRST HOUR: CPT | Mod: GC,25

## 2023-12-07 RX ORDER — ALBUMIN HUMAN 25 %
100 VIAL (ML) INTRAVENOUS ONCE
Refills: 0 | Status: COMPLETED | OUTPATIENT
Start: 2023-12-07 | End: 2023-12-07

## 2023-12-07 RX ADMIN — Medication 9 UNIT(S): at 12:41

## 2023-12-07 RX ADMIN — Medication 1: at 17:03

## 2023-12-07 RX ADMIN — Medication 1 APPLICATION(S): at 17:04

## 2023-12-07 RX ADMIN — BUDESONIDE AND FORMOTEROL FUMARATE DIHYDRATE 2 PUFF(S): 160; 4.5 AEROSOL RESPIRATORY (INHALATION) at 20:51

## 2023-12-07 RX ADMIN — Medication 75 MILLIGRAM(S): at 13:56

## 2023-12-07 RX ADMIN — CHLORHEXIDINE GLUCONATE 1 APPLICATION(S): 213 SOLUTION TOPICAL at 21:33

## 2023-12-07 RX ADMIN — Medication 9 UNIT(S): at 08:32

## 2023-12-07 RX ADMIN — CARVEDILOL PHOSPHATE 25 MILLIGRAM(S): 80 CAPSULE, EXTENDED RELEASE ORAL at 05:55

## 2023-12-07 RX ADMIN — ISOSORBIDE DINITRATE 30 MILLIGRAM(S): 5 TABLET ORAL at 06:38

## 2023-12-07 RX ADMIN — Medication 75 MILLIGRAM(S): at 05:55

## 2023-12-07 RX ADMIN — CARVEDILOL PHOSPHATE 25 MILLIGRAM(S): 80 CAPSULE, EXTENDED RELEASE ORAL at 17:19

## 2023-12-07 RX ADMIN — ISOSORBIDE DINITRATE 30 MILLIGRAM(S): 5 TABLET ORAL at 11:27

## 2023-12-07 RX ADMIN — INSULIN GLARGINE 12 UNIT(S): 100 INJECTION, SOLUTION SUBCUTANEOUS at 21:23

## 2023-12-07 RX ADMIN — ISOSORBIDE DINITRATE 30 MILLIGRAM(S): 5 TABLET ORAL at 17:19

## 2023-12-07 RX ADMIN — Medication 75 MILLIGRAM(S): at 21:23

## 2023-12-07 RX ADMIN — Medication 9 UNIT(S): at 17:04

## 2023-12-07 RX ADMIN — Medication 81 MILLIGRAM(S): at 11:27

## 2023-12-07 RX ADMIN — Medication 1: at 12:41

## 2023-12-07 RX ADMIN — Medication 50 MILLILITER(S): at 11:19

## 2023-12-07 RX ADMIN — ATORVASTATIN CALCIUM 80 MILLIGRAM(S): 80 TABLET, FILM COATED ORAL at 21:23

## 2023-12-07 NOTE — PROGRESS NOTE ADULT - ATTENDING COMMENTS
History of anxiety disorder and CAD s/p PCI  Admitted with cardiogenic shock and respiratory failure in the setting of stent restenosis  Transferred out and had VT/VF cardiac arrest  Readmitted to CICU  Listed status 2 for heart transplant   A+Ox3, anxious - continue Atarax prn  Cardiogenic shock requiring IABP, also on Bidil for afterload reduction - will continue   S/p VT/VF arrest on Amiodarone PO and Coreg  SpO2 mid 90s on room air  Soft diet  Likely CKD post arrest, dry on exam  Continue to hold diuretics  H/H low but acceptable on Heparin drip for LV thrombus and IABP  Afebrile, +bcx was a contaminant per ID, Vancomycin stopped  Sugars controlled on Lantus/Admelog  Dermatology recommended steroid cream for rash on b/l UEs but patient has deferred  IABP 11/20

## 2023-12-07 NOTE — PROGRESS NOTE ADULT - SUBJECTIVE AND OBJECTIVE BOX
Follow Up:  pre-OHT    Interval History: afebrile. rash improving.     REVIEW OF SYSTEMS  [  ] ROS unobtainable because:    [x  ] All other systems negative except as noted below    Constitutional:  [ ] fever [ ] chills  [ ] weight loss  [ ] weakness  Skin:  [ x] rash [ ] phlebitis	  Eyes: [ ] icterus [ ] pain  [ ] discharge	  ENMT: [ ] sore throat  [ ] thrush [ ] ulcers [ ] exudates  Respiratory: [ ] dyspnea [ ] hemoptysis [ ] cough [ ] sputum	  Cardiovascular:  [ ] chest pain [ ] palpitations [ ] edema	  Gastrointestinal:  [ ] nausea [ ] vomiting [ ] diarrhea [ ] constipation [ ] pain	  Genitourinary:  [ ] dysuria [ ] frequency [ ] hematuria [ ] discharge [ ] flank pain  [ ] incontinence  Musculoskeletal:  [ ] myalgias [ ] arthralgias [ ] arthritis  [ ] back pain  Neurological:  [ ] headache [ ] seizures  [ ] confusion/altered mental status    Allergies  penicillins (Unknown)        ANTIMICROBIALS:      OTHER MEDS:  MEDICATIONS  (STANDING):  aspirin  chewable 81 daily  atorvastatin 80 at bedtime  budesonide  80 MICROgram(s)/formoterol 4.5 MICROgram(s) Inhaler 2 two times a day  carvedilol 25 every 12 hours  heparin  Infusion 1300 <Continuous>  hydrALAZINE 75 three times a day  hydrOXYzine hydrochloride 25 two times a day PRN  insulin glargine Injectable (LANTUS) 12 at bedtime  insulin lispro (ADMELOG) corrective regimen sliding scale  three times a day before meals  insulin lispro (ADMELOG) corrective regimen sliding scale  at bedtime  insulin lispro Injectable (ADMELOG) 9 three times a day before meals  isosorbide   dinitrate Tablet (ISORDIL) 30 three times a day  polyethylene glycol 3350 17 two times a day  senna 2 at bedtime      Vital Signs Last 24 Hrs  T(C): 36.7 (07 Dec 2023 12:00), Max: 36.8 (06 Dec 2023 19:00)  T(F): 98 (07 Dec 2023 12:00), Max: 98.3 (06 Dec 2023 19:00)  HR: 82 (07 Dec 2023 14:00) (74 - 86)  BP: --  BP(mean): --  RR: 22 (07 Dec 2023 14:00) (15 - 25)  SpO2: 97% (07 Dec 2023 12:00) (97% - 100%)    Parameters below as of 07 Dec 2023 12:00  Patient On (Oxygen Delivery Method): room air      PHYSICAL EXAMINATION:  General: Alert and Awake, NAD  Cardiac: RRR, No M/R/G  Resp: CTAB, No Wh/Rh/Ra  Abdomen: NBS, NT/ND, No HSM, No rigidity or guarding  MSK: No LE edema. No Calf tenderness  Vasc: R Balloon Pump (No surrounding erythema, drainage or tenderness to palpation)  Skin: Maculopapular rash on UE, UE and to lesser degree the chest. Skin is warm and dry to the touch.   Neuro: Alert and Awake. CN 2-12 Grossly intact. Moves all four extremities spontaneously.  Psych: Calm, Pleasant, Cooperative                            12.0   8.18  )-----------( 140      ( 07 Dec 2023 00:58 )             36.9       12-07    135  |  103  |  35<H>  ----------------------------<  149<H>  4.9   |  21<L>  |  1.35<H>    Ca    10.4      07 Dec 2023 00:58  Phos  3.5     12-07  Mg     2.0     12-07    TPro  7.0  /  Alb  3.9  /  TBili  0.3  /  DBili  x   /  AST  42<H>  /  ALT  117<H>  /  AlkPhos  63  12-07      Urinalysis Basic - ( 07 Dec 2023 00:58 )    Color: x / Appearance: x / SG: x / pH: x  Gluc: 149 mg/dL / Ketone: x  / Bili: x / Urobili: x   Blood: x / Protein: x / Nitrite: x   Leuk Esterase: x / RBC: x / WBC x   Sq Epi: x / Non Sq Epi: x / Bacteria: x        MICROBIOLOGY:  v  .Blood Blood-Peripheral  12-04-23   No growth at 48 Hours  --  --      .Blood Blood-Peripheral  12-04-23   No growth at 48 Hours  --  --      .Blood Blood  11-30-23   Growth in anaerobic bottle: Cutibacterium acnes  Susceptibility to follow.  Direct identification is available within approximately 3-5  hours either by Blood Panel Multiplexed PCR or Direct  MALDI-TOF. Details: [email or web address]  --  Blood Culture PCR      .Blood Blood-Venous  11-29-23   No growth at 5 days  --  --      .Blood Blood-Peripheral  11-18-23   No growth at 5 days  --  --      .Blood Blood-Peripheral  11-18-23   No growth at 5 days  --  --      Clean Catch Clean Catch (Midstream)  11-18-23   10,000 - 49,000 CFU/mL Enterococcus faecalis  <10,000 CFU/ml Normal Urogenital kaitlynn present  --  Enterococcus faecalis      .Blood Blood  11-17-23   Growth in aerobic and anaerobic bottles: Enterococcus faecalis  See previous culture [id number]  Growth in aerobic bottle: Staphylococcus epidermidis  --  Staphylococcus epidermidis      .Blood Blood  11-17-23   Growth in aerobic bottle: Enterococcus faecalis  Growth in anaerobic bottle: Staphylococcus epidermidis "Susceptibilities  not performed"  Direct identification is available within approximately 3-5  hours either by Blood Panel Multiplexed PCR or Direct  MALDI-TOF. Details: [email or web address]  --  Blood Culture PCR  Blood Culture PCR  Enterococcus faecalis      .Blood Blood-Peripheral  11-10-23   No growth at 5 days  --  --        CMV IgG Antibody: 4.20 U/mL (11-10-23 @ 17:29)  Toxoplasma IgG Screen: <3.0 IU/mL (11-10-23 @ 17:29)  HIV-1 RNA Quantitative, Viral Load Log: NOT DET. lg /mL (11-10-23 @ 17:06)    Rapid RVP Result: NotDetec (12-05 @ 20:42)        RADIOLOGY:    <The imaging below has been reviewed and visualized by me independently. Findings as detailed in report below>    < from: Xray Chest 1 View- PORTABLE-Routine (Xray Chest 1 View- PORTABLE-Routine in AM.) (12.07.23 @ 03:11) >  IMPRESSION:  Visualized portions of the lungs are clear.  Lines and tubes as described.    < end of copied text >   Follow Up:  pre-OHT    Interval History: afebrile. rash improving.     REVIEW OF SYSTEMS  [  ] ROS unobtainable because:    [x  ] All other systems negative except as noted below    Constitutional:  [ ] fever [ ] chills  [ ] weight loss  [ ] weakness  Skin:  [ x] rash [ ] phlebitis	  Eyes: [ ] icterus [ ] pain  [ ] discharge	  ENMT: [ ] sore throat  [ ] thrush [ ] ulcers [ ] exudates  Respiratory: [ ] dyspnea [ ] hemoptysis [ ] cough [ ] sputum	  Cardiovascular:  [ ] chest pain [ ] palpitations [ ] edema	  Gastrointestinal:  [ ] nausea [ ] vomiting [ ] diarrhea [ ] constipation [ ] pain	  Genitourinary:  [ ] dysuria [ ] frequency [ ] hematuria [ ] discharge [ ] flank pain  [ ] incontinence  Musculoskeletal:  [ ] myalgias [ ] arthralgias [ ] arthritis  [ ] back pain  Neurological:  [ ] headache [ ] seizures  [ ] confusion/altered mental status    Allergies  penicillins (Unknown)        ANTIMICROBIALS:      OTHER MEDS:  MEDICATIONS  (STANDING):  aspirin  chewable 81 daily  atorvastatin 80 at bedtime  budesonide  80 MICROgram(s)/formoterol 4.5 MICROgram(s) Inhaler 2 two times a day  carvedilol 25 every 12 hours  heparin  Infusion 1300 <Continuous>  hydrALAZINE 75 three times a day  hydrOXYzine hydrochloride 25 two times a day PRN  insulin glargine Injectable (LANTUS) 12 at bedtime  insulin lispro (ADMELOG) corrective regimen sliding scale  three times a day before meals  insulin lispro (ADMELOG) corrective regimen sliding scale  at bedtime  insulin lispro Injectable (ADMELOG) 9 three times a day before meals  isosorbide   dinitrate Tablet (ISORDIL) 30 three times a day  polyethylene glycol 3350 17 two times a day  senna 2 at bedtime      Vital Signs Last 24 Hrs  T(C): 36.7 (07 Dec 2023 12:00), Max: 36.8 (06 Dec 2023 19:00)  T(F): 98 (07 Dec 2023 12:00), Max: 98.3 (06 Dec 2023 19:00)  HR: 82 (07 Dec 2023 14:00) (74 - 86)  BP: --  BP(mean): --  RR: 22 (07 Dec 2023 14:00) (15 - 25)  SpO2: 97% (07 Dec 2023 12:00) (97% - 100%)    Parameters below as of 07 Dec 2023 12:00  Patient On (Oxygen Delivery Method): room air      PHYSICAL EXAMINATION:  General: Alert and Awake, NAD  Cardiac: RRR, No M/R/G  Resp: CTAB, No Wh/Rh/Ra  Abdomen: NBS, NT/ND, No HSM, No rigidity or guarding  MSK: No LE edema. No Calf tenderness  Vasc: R Balloon Pump (No surrounding erythema, drainage or tenderness to palpation)  Skin: Maculopapular rash on UE, UE and to lesser degree the chest. Skin is warm and dry to the touch.   Neuro: Alert and Awake. CN 2-12 Grossly intact. Moves all four extremities spontaneously.  Psych: Calm, Pleasant, Cooperative                            12.0   8.18  )-----------( 140      ( 07 Dec 2023 00:58 )             36.9       12-07    135  |  103  |  35<H>  ----------------------------<  149<H>  4.9   |  21<L>  |  1.35<H>    Ca    10.4      07 Dec 2023 00:58  Phos  3.5     12-07  Mg     2.0     12-07    TPro  7.0  /  Alb  3.9  /  TBili  0.3  /  DBili  x   /  AST  42<H>  /  ALT  117<H>  /  AlkPhos  63  12-07      Urinalysis Basic - ( 07 Dec 2023 00:58 )    Color: x / Appearance: x / SG: x / pH: x  Gluc: 149 mg/dL / Ketone: x  / Bili: x / Urobili: x   Blood: x / Protein: x / Nitrite: x   Leuk Esterase: x / RBC: x / WBC x   Sq Epi: x / Non Sq Epi: x / Bacteria: x        MICROBIOLOGY:  v  .Blood Blood-Peripheral  12-04-23   No growth at 48 Hours  --  --      .Blood Blood-Peripheral  12-04-23   No growth at 48 Hours  --  --      .Blood Blood  11-30-23   Growth in anaerobic bottle: Cutibacterium acnes  Susceptibility to follow.  Direct identification is available within approximately 3-5  hours either by Blood Panel Multiplexed PCR or Direct  MALDI-TOF. Details: [email or web address]  --  Blood Culture PCR      .Blood Blood-Venous  11-29-23   No growth at 5 days  --  --      .Blood Blood-Peripheral  11-18-23   No growth at 5 days  --  --      .Blood Blood-Peripheral  11-18-23   No growth at 5 days  --  --      Clean Catch Clean Catch (Midstream)  11-18-23   10,000 - 49,000 CFU/mL Enterococcus faecalis  <10,000 CFU/ml Normal Urogenital kaitlynn present  --  Enterococcus faecalis      .Blood Blood  11-17-23   Growth in aerobic and anaerobic bottles: Enterococcus faecalis  See previous culture [id number]  Growth in aerobic bottle: Staphylococcus epidermidis  --  Staphylococcus epidermidis      .Blood Blood  11-17-23   Growth in aerobic bottle: Enterococcus faecalis  Growth in anaerobic bottle: Staphylococcus epidermidis "Susceptibilities  not performed"  Direct identification is available within approximately 3-5  hours either by Blood Panel Multiplexed PCR or Direct  MALDI-TOF. Details: [email or web address]  --  Blood Culture PCR  Blood Culture PCR  Enterococcus faecalis      .Blood Blood-Peripheral  11-10-23   No growth at 5 days  --  --        CMV IgG Antibody: 4.20 U/mL (11-10-23 @ 17:29)  Toxoplasma IgG Screen: <3.0 IU/mL (11-10-23 @ 17:29)  HIV-1 RNA Quantitative, Viral Load Log: NOT DET. lg /mL (11-10-23 @ 17:06)    Rapid RVP Result: NotDetec (12-05 @ 20:42)        RADIOLOGY:    <The imaging below has been reviewed and visualized by me independently. Findings as detailed in report below>    < from: Xray Chest 1 View- PORTABLE-Routine (Xray Chest 1 View- PORTABLE-Routine in AM.) (12.07.23 @ 03:11) >  IMPRESSION:  Visualized portions of the lungs are clear.  Lines and tubes as described.    < end of copied text >

## 2023-12-07 NOTE — PROGRESS NOTE ADULT - ATTENDING COMMENTS
Patient was found in bed in NAD, he remains stable on IABP support 1:1 and hydralazine/ISDN for additional afterload reduction. Bcx yesterday were negative and he is cleared by transplant ID and dental for reactivation. Renal function stable, creat 1.3 from 1.4 after small bolus. He is on A/C with heparin, PTT 67.     Continue IABP support 1:1 and hydralazine/ISDN for afterload reduction   Strict I/Os   Daily weight  Give albumin 25% 200 ml for pre-renal azotemia   Keep holding diuretics   Off Abx   Keep off amiodarone for now due to elevated LFTS   Patient is stable for reactivation as status 2  Discussed with patient and CCU team

## 2023-12-07 NOTE — PROGRESS NOTE ADULT - SUBJECTIVE AND OBJECTIVE BOX
LD VALENCIA  58y Male  MRN:[id number]    Patient is a 58y old  Male who presents with a chief complaint of NSTEMI (01 Nov 2023 20:29)    HPI:  57yo M w/ hx HTN, CAD w/ 1 stent in 2009, ICH (2008) presenting with abn ekg. Patient presented to [facility] where he was found to have STEMI, recommended to get cath however patient did not want to get it there so it left and came here.  Patient initially had cough, congestion, fever, was placed on antibiotics on Sunday.  Started feeling nauseous and had a presyncopal event after which he presented to ED last night.  Had chest pain as well.  Chest pain is midsternal.  Not currently having chest pain.  Received 4 aspirin 30 min pta. (01 Nov 2023 15:11)      Patient seen and evaluated at bedside in CCU. interval events noted    Interval HPI: remain in ccu. IABP in place        PAST MEDICAL & SURGICAL HISTORY:  HTN (hypertension)      CAD (coronary artery disease)  2009; stent      Intracranial hemorrhage  2008      Respiratory arrest  december 1st      Myocardial infarction, unspecified MI type, unspecified artery      History of coronary artery stent placement          REVIEW OF SYSTEMS:  as per hpi     VITALS:   ICU Vital Signs Last 24 Hrs  T(C): 36.8 (07 Dec 2023 19:00), Max: 36.8 (07 Dec 2023 19:00)  T(F): 98.3 (07 Dec 2023 19:00), Max: 98.3 (07 Dec 2023 19:00)  HR: 81 (07 Dec 2023 22:00) (74 - 87)  BP: --  BP(mean): --  ABP: --  ABP(mean): --  RR: 23 (07 Dec 2023 22:00) (15 - 25)  SpO2: 95% (07 Dec 2023 20:40) (95% - 99%)    O2 Parameters below as of 07 Dec 2023 20:40  Patient On (Oxygen Delivery Method): room air              PHYSICAL EXAM:  GENERAL: NAD, comfortable   HEAD:  Atraumatic, Normocephalic  EYES: EOMI, PERRLA, conjunctiva and sclera clear  NECK: Supple, No JVD   CHEST/LUNG: Clear to auscultation bilaterally; No wheeze  HEART: Regular rate and rhythm; No murmurs, rubs, or gallops  ABDOMEN: Soft, Nontender, Nondistended; Bowel sounds present  EXTREMITIES:  2+ Peripheral Pulses, No clubbing, cyanosis, or edema  NEUROLOGY: nonfocal  SKIN: +rash  +iabp    Consultant(s) Notes Reviewed:  [x ] YES  [ ] NO  Care Discussed with Consultants/Other Providers [ x] YES  [ ] NO    MEDS:   MEDICATIONS  (STANDING):  aspirin  chewable 81 milliGRAM(s) Oral daily  atorvastatin 80 milliGRAM(s) Oral at bedtime  budesonide  80 MICROgram(s)/formoterol 4.5 MICROgram(s) Inhaler 2 Puff(s) Inhalation two times a day  carvedilol 25 milliGRAM(s) Oral every 12 hours  chlorhexidine 2% Cloths 1 Application(s) Topical daily  clobetasol 0.05% Ointment 1 Application(s) Topical two times a day  heparin  Infusion 1300 Unit(s)/Hr (14 mL/Hr) IV Continuous <Continuous>  hydrALAZINE 75 milliGRAM(s) Oral three times a day  insulin glargine Injectable (LANTUS) 12 Unit(s) SubCutaneous at bedtime  insulin lispro (ADMELOG) corrective regimen sliding scale   SubCutaneous three times a day before meals  insulin lispro (ADMELOG) corrective regimen sliding scale   SubCutaneous at bedtime  insulin lispro Injectable (ADMELOG) 9 Unit(s) SubCutaneous three times a day before meals  isosorbide   dinitrate Tablet (ISORDIL) 30 milliGRAM(s) Oral three times a day  polyethylene glycol 3350 17 Gram(s) Oral two times a day  senna 2 Tablet(s) Oral at bedtime    MEDICATIONS  (PRN):  hydrOXYzine hydrochloride 25 milliGRAM(s) Oral two times a day PRN Anxiety        ALLERGIES:  penicillins (Unknown)      LABS:                                        12.0   8.18  )-----------( 140      ( 07 Dec 2023 00:58 )             36.9   12-07    135  |  103  |  35<H>  ----------------------------<  149<H>  4.9   |  21<L>  |  1.35<H>    Ca    10.4      07 Dec 2023 00:58  Phos  3.5     12-07  Mg     2.0     12-07    TPro  7.0  /  Alb  3.9  /  TBili  0.3  /  DBili  x   /  AST  42<H>  /  ALT  117<H>  /  AlkPhos  63  12-07      < from: CT Abdomen and Pelvis No Cont (11.28.23 @ 03:38) >  IMPRESSION:  Mildly dilated colon and prominent but not overly dilated small bowel   with air-fluid levels. No discrete transition point. Findings are   suggestive of ileus.    Intra-aortic balloon pump with the inferior marker at the level of the   inferior mesenteric artery and the balloon overlying the origins of the   renal arteries, celiac artery, and superior mesenteric artery. Consider   repositioning. Concern for IABP positioning was discussed with LANETTE Hawthorne   on 11/28/2023 at 3:42 AM by Dr. Shepard.    < end of copied text >          < from: Colonoscopy (11.17.23 @ 10:35) >                                                                                                        Impression:          - The entire examined colon is normal on direct and retroflexion views.                       - No specimens collected.  Recommendation:      - Resume previous diet today.                       - No large polyps or masses detected, No objection from GI standpoint to                 proceed with heart transplantation/advanced heart therapies.                       - Please call back should any further questions or concerns arise.    < end of copied text >     < from: Xray Chest 1 View- PORTABLE-Urgent (11.01.23 @ 07:42) >    IMPRESSION:  Clear lungs.    ---End of Report ---        < end of copied text >  < from: TTE W or WO Ultrasound Enhancing Agent (11.01.23 @ 10:23) >  _____________________________     CONCLUSIONS:      1. Left ventricular cavity is moderately dilated. Left ventricular wall thickness is normal. Left ventricular systolic function is severely decreased with an ejection fraction of 32 % by Chinchilla's method of disks. Regional wall motion abnormalities present.   2. Multiple segmental abnormalities exist. See findings.   3. There is moderate (grade 2) left ventricular diastolic dysfunction, with indeterminant filling pressure.   4. Normal right ventricular cavity size, wall thickness, and systolic function.   5. No significant valvular disease.   6. No pericardial effusion seen.   7. Compared to the transthoracic echocardiogram performed on 1/25/2017 the areas of akinesis are unchanged but there has been a decline in LV systolic function with new areas of hypokinesis.    __________________________________________________________________    < end of copied text >  < from: TTE Limited W or WO Ultrasound Enhancing Agent (11.02.23 @ 07:41) >  __________________________     CONCLUSIONS:      1. After obtaining consent, Definity ultrasound enhancing agent was given for enhanced left ventricular opacification and improved delineation of the left ventricular endocardial borders. Left ventricular systolic function is severely decreased with a calculated ejection fraction of 22 % by the Chinchilla's biplane method of disks. There is a left ventricular thrombus.   2. Findings were discussed with Litzy BOSS on 11/2/2023 at 8.49am.   3. There is a left ventricular thrombus.    _________________________________________________________________    < end of copied text >   LD VALENCIA  58y Male  MRN:[id number]    Patient is a 58y old  Male who presents with a chief complaint of NSTEMI (01 Nov 2023 20:29)    HPI:  59yo M w/ hx HTN, CAD w/ 1 stent in 2009, ICH (2008) presenting with abn ekg. Patient presented to [facility] where he was found to have STEMI, recommended to get cath however patient did not want to get it there so it left and came here.  Patient initially had cough, congestion, fever, was placed on antibiotics on Sunday.  Started feeling nauseous and had a presyncopal event after which he presented to ED last night.  Had chest pain as well.  Chest pain is midsternal.  Not currently having chest pain.  Received 4 aspirin 30 min pta. (01 Nov 2023 15:11)      Patient seen and evaluated at bedside in CCU. interval events noted    Interval HPI: remain in ccu. IABP in place        PAST MEDICAL & SURGICAL HISTORY:  HTN (hypertension)      CAD (coronary artery disease)  2009; stent      Intracranial hemorrhage  2008      Respiratory arrest  december 1st      Myocardial infarction, unspecified MI type, unspecified artery      History of coronary artery stent placement          REVIEW OF SYSTEMS:  as per hpi     VITALS:   ICU Vital Signs Last 24 Hrs  T(C): 36.8 (07 Dec 2023 19:00), Max: 36.8 (07 Dec 2023 19:00)  T(F): 98.3 (07 Dec 2023 19:00), Max: 98.3 (07 Dec 2023 19:00)  HR: 81 (07 Dec 2023 22:00) (74 - 87)  BP: --  BP(mean): --  ABP: --  ABP(mean): --  RR: 23 (07 Dec 2023 22:00) (15 - 25)  SpO2: 95% (07 Dec 2023 20:40) (95% - 99%)    O2 Parameters below as of 07 Dec 2023 20:40  Patient On (Oxygen Delivery Method): room air              PHYSICAL EXAM:  GENERAL: NAD, comfortable   HEAD:  Atraumatic, Normocephalic  EYES: EOMI, PERRLA, conjunctiva and sclera clear  NECK: Supple, No JVD   CHEST/LUNG: Clear to auscultation bilaterally; No wheeze  HEART: Regular rate and rhythm; No murmurs, rubs, or gallops  ABDOMEN: Soft, Nontender, Nondistended; Bowel sounds present  EXTREMITIES:  2+ Peripheral Pulses, No clubbing, cyanosis, or edema  NEUROLOGY: nonfocal  SKIN: +rash  +iabp    Consultant(s) Notes Reviewed:  [x ] YES  [ ] NO  Care Discussed with Consultants/Other Providers [ x] YES  [ ] NO    MEDS:   MEDICATIONS  (STANDING):  aspirin  chewable 81 milliGRAM(s) Oral daily  atorvastatin 80 milliGRAM(s) Oral at bedtime  budesonide  80 MICROgram(s)/formoterol 4.5 MICROgram(s) Inhaler 2 Puff(s) Inhalation two times a day  carvedilol 25 milliGRAM(s) Oral every 12 hours  chlorhexidine 2% Cloths 1 Application(s) Topical daily  clobetasol 0.05% Ointment 1 Application(s) Topical two times a day  heparin  Infusion 1300 Unit(s)/Hr (14 mL/Hr) IV Continuous <Continuous>  hydrALAZINE 75 milliGRAM(s) Oral three times a day  insulin glargine Injectable (LANTUS) 12 Unit(s) SubCutaneous at bedtime  insulin lispro (ADMELOG) corrective regimen sliding scale   SubCutaneous three times a day before meals  insulin lispro (ADMELOG) corrective regimen sliding scale   SubCutaneous at bedtime  insulin lispro Injectable (ADMELOG) 9 Unit(s) SubCutaneous three times a day before meals  isosorbide   dinitrate Tablet (ISORDIL) 30 milliGRAM(s) Oral three times a day  polyethylene glycol 3350 17 Gram(s) Oral two times a day  senna 2 Tablet(s) Oral at bedtime    MEDICATIONS  (PRN):  hydrOXYzine hydrochloride 25 milliGRAM(s) Oral two times a day PRN Anxiety        ALLERGIES:  penicillins (Unknown)      LABS:                                        12.0   8.18  )-----------( 140      ( 07 Dec 2023 00:58 )             36.9   12-07    135  |  103  |  35<H>  ----------------------------<  149<H>  4.9   |  21<L>  |  1.35<H>    Ca    10.4      07 Dec 2023 00:58  Phos  3.5     12-07  Mg     2.0     12-07    TPro  7.0  /  Alb  3.9  /  TBili  0.3  /  DBili  x   /  AST  42<H>  /  ALT  117<H>  /  AlkPhos  63  12-07      < from: CT Abdomen and Pelvis No Cont (11.28.23 @ 03:38) >  IMPRESSION:  Mildly dilated colon and prominent but not overly dilated small bowel   with air-fluid levels. No discrete transition point. Findings are   suggestive of ileus.    Intra-aortic balloon pump with the inferior marker at the level of the   inferior mesenteric artery and the balloon overlying the origins of the   renal arteries, celiac artery, and superior mesenteric artery. Consider   repositioning. Concern for IABP positioning was discussed with LANETTE Hawthorne   on 11/28/2023 at 3:42 AM by Dr. Shepard.    < end of copied text >          < from: Colonoscopy (11.17.23 @ 10:35) >                                                                                                        Impression:          - The entire examined colon is normal on direct and retroflexion views.                       - No specimens collected.  Recommendation:      - Resume previous diet today.                       - No large polyps or masses detected, No objection from GI standpoint to                 proceed with heart transplantation/advanced heart therapies.                       - Please call back should any further questions or concerns arise.    < end of copied text >     < from: Xray Chest 1 View- PORTABLE-Urgent (11.01.23 @ 07:42) >    IMPRESSION:  Clear lungs.    ---End of Report ---        < end of copied text >  < from: TTE W or WO Ultrasound Enhancing Agent (11.01.23 @ 10:23) >  _____________________________     CONCLUSIONS:      1. Left ventricular cavity is moderately dilated. Left ventricular wall thickness is normal. Left ventricular systolic function is severely decreased with an ejection fraction of 32 % by Chinchilla's method of disks. Regional wall motion abnormalities present.   2. Multiple segmental abnormalities exist. See findings.   3. There is moderate (grade 2) left ventricular diastolic dysfunction, with indeterminant filling pressure.   4. Normal right ventricular cavity size, wall thickness, and systolic function.   5. No significant valvular disease.   6. No pericardial effusion seen.   7. Compared to the transthoracic echocardiogram performed on 1/25/2017 the areas of akinesis are unchanged but there has been a decline in LV systolic function with new areas of hypokinesis.    __________________________________________________________________    < end of copied text >  < from: TTE Limited W or WO Ultrasound Enhancing Agent (11.02.23 @ 07:41) >  __________________________     CONCLUSIONS:      1. After obtaining consent, Definity ultrasound enhancing agent was given for enhanced left ventricular opacification and improved delineation of the left ventricular endocardial borders. Left ventricular systolic function is severely decreased with a calculated ejection fraction of 22 % by the Chinchilla's biplane method of disks. There is a left ventricular thrombus.   2. Findings were discussed with Litzy BOSS on 11/2/2023 at 8.49am.   3. There is a left ventricular thrombus.    _________________________________________________________________    < end of copied text >

## 2023-12-07 NOTE — PROGRESS NOTE ADULT - SUBJECTIVE AND OBJECTIVE BOX
PATIENT:  DEVORAH VALENCIA  [id number]    59yo M w/ hx HTN, CAD w/ 1 stent in 2009, ICH (2008) presenting with abn ekg. Patient presented to [facility] where he was found to have STEMI, recommended to get cath however patient did not want to get it there so left AMA and presented to [facility] for evaluation. Currently awaiting transplant.     INTERVAL HISTORY/OVERNIGHT EVENTS:  The patient was seen and examined at bedside.     REVIEW OF SYSTEMS:    all other ROS negative except as per HPI.     MEDICATIONS:  MEDICATIONS  (STANDING):  aspirin  chewable 81 milliGRAM(s) Oral daily  atorvastatin 80 milliGRAM(s) Oral at bedtime  budesonide  80 MICROgram(s)/formoterol 4.5 MICROgram(s) Inhaler 2 Puff(s) Inhalation two times a day  carvedilol 25 milliGRAM(s) Oral every 12 hours  chlorhexidine 2% Cloths 1 Application(s) Topical daily  heparin  Infusion 1300 Unit(s)/Hr (14 mL/Hr) IV Continuous <Continuous>  hydrALAZINE 75 milliGRAM(s) Oral three times a day  insulin glargine Injectable (LANTUS) 12 Unit(s) SubCutaneous at bedtime  insulin lispro (ADMELOG) corrective regimen sliding scale   SubCutaneous three times a day before meals  insulin lispro (ADMELOG) corrective regimen sliding scale   SubCutaneous at bedtime  insulin lispro Injectable (ADMELOG) 9 Unit(s) SubCutaneous three times a day before meals  isosorbide   dinitrate Tablet (ISORDIL) 30 milliGRAM(s) Oral three times a day  polyethylene glycol 3350 17 Gram(s) Oral two times a day  senna 2 Tablet(s) Oral at bedtime    MEDICATIONS  (PRN):  hydrOXYzine hydrochloride 25 milliGRAM(s) Oral two times a day PRN Anxiety      ALLERGIES:  Allergies    penicillins (Unknown)      OBJECTIVE:  ICU Vital Signs Last 24 Hrs  T(C): 36.7 (07 Dec 2023 03:00), Max: 36.9 (06 Dec 2023 14:00)  T(F): 98.1 (07 Dec 2023 03:00), Max: 98.4 (06 Dec 2023 14:00)  HR: 80 (07 Dec 2023 07:00) (72 - 86)    RR: 21 (07 Dec 2023 07:00) (15 - 35)  SpO2: 99% (07 Dec 2023 06:00) (97% - 100%)    O2 Parameters below as of 07 Dec 2023 06:00  Patient On (Oxygen Delivery Method): room air      CAPILLARY BLOOD GLUCOSE  POCT Blood Glucose.: 139 mg/dL (06 Dec 2023 21:23)  POCT Blood Glucose.: 118 mg/dL (06 Dec 2023 17:12)  POCT Blood Glucose.: 241 mg/dL (06 Dec 2023 12:18)  POCT Blood Glucose.: 127 mg/dL (06 Dec 2023 08:23)      POCT Blood Glucose.: 139 mg/dL (06 Dec 2023 21:23)    I&O's Summary    06 Dec 2023 07:01  -  07 Dec 2023 07:00  --------------------------------------------------------  IN: 1064 mL / OUT: 2450 mL / NET: -1386 mL        PHYSICAL EXAMINATION:  General: WN/WD NAD  HEENT: PERRLA, EOMI, moist mucous membranes  Neurology: A&Ox3, nonfocal, MOISE x 4  Respiratory: CTA B/L, normal respiratory effort, no wheezes, crackles, rales  CV: RRR, S1S2, no murmurs, rubs or gallops  Abdominal: Soft, NT, ND +BS, Last BM  Extremities: No edema, + peripheral pulses    LABS:                        12.0   8.18  )-----------( 140      ( 07 Dec 2023 00:58 )             36.9     12-07    135  |  103  |  35<H>  ----------------------------<  149<H>  4.9   |  21<L>  |  1.35<H>    Ca    10.4      07 Dec 2023 00:58  Phos  3.5     12-07  Mg     2.0     12-07    TPro  7.0  /  Alb  3.9  /  TBili  0.3  /  DBili  x   /  AST  42<H>  /  ALT  117<H>  /  AlkPhos  63  12-07    LIVER FUNCTIONS - ( 07 Dec 2023 00:58 )  Alb: 3.9 g/dL / Pro: 7.0 g/dL / ALK PHOS: 63 U/L / ALT: 117 U/L / AST: 42 U/L / GGT: x           PT/INR - ( 07 Dec 2023 00:58 )   PT: 11.4 sec;   INR: 1.09 ratio       PTT - ( 07 Dec 2023 00:58 )  PTT:67.3 sec    Urinalysis Basic - ( 07 Dec 2023 00:58 )    Color: x / Appearance: x / SG: x / pH: x  Gluc: 149 mg/dL / Ketone: x  / Bili: x / Urobili: x   Blood: x / Protein: x / Nitrite: x   Leuk Esterase: x / RBC: x / WBC x   Sq Epi: x / Non Sq Epi: x / Bacteria: x     PATIENT:  DEVORAH VALENCIA  [id number]    57yo M w/ hx HTN, CAD w/ 1 stent in 2009, ICH (2008) presenting with abn ekg. Patient presented to [facility] where he was found to have STEMI, recommended to get cath however patient did not want to get it there so left AMA and presented to [facility] for evaluation. Currently awaiting transplant.     INTERVAL HISTORY/OVERNIGHT EVENTS:  The patient was seen and examined at bedside.     REVIEW OF SYSTEMS:    all other ROS negative except as per HPI.     MEDICATIONS:  MEDICATIONS  (STANDING):  aspirin  chewable 81 milliGRAM(s) Oral daily  atorvastatin 80 milliGRAM(s) Oral at bedtime  budesonide  80 MICROgram(s)/formoterol 4.5 MICROgram(s) Inhaler 2 Puff(s) Inhalation two times a day  carvedilol 25 milliGRAM(s) Oral every 12 hours  chlorhexidine 2% Cloths 1 Application(s) Topical daily  heparin  Infusion 1300 Unit(s)/Hr (14 mL/Hr) IV Continuous <Continuous>  hydrALAZINE 75 milliGRAM(s) Oral three times a day  insulin glargine Injectable (LANTUS) 12 Unit(s) SubCutaneous at bedtime  insulin lispro (ADMELOG) corrective regimen sliding scale   SubCutaneous three times a day before meals  insulin lispro (ADMELOG) corrective regimen sliding scale   SubCutaneous at bedtime  insulin lispro Injectable (ADMELOG) 9 Unit(s) SubCutaneous three times a day before meals  isosorbide   dinitrate Tablet (ISORDIL) 30 milliGRAM(s) Oral three times a day  polyethylene glycol 3350 17 Gram(s) Oral two times a day  senna 2 Tablet(s) Oral at bedtime    MEDICATIONS  (PRN):  hydrOXYzine hydrochloride 25 milliGRAM(s) Oral two times a day PRN Anxiety      ALLERGIES:  Allergies    penicillins (Unknown)      OBJECTIVE:  ICU Vital Signs Last 24 Hrs  T(C): 36.7 (07 Dec 2023 03:00), Max: 36.9 (06 Dec 2023 14:00)  T(F): 98.1 (07 Dec 2023 03:00), Max: 98.4 (06 Dec 2023 14:00)  HR: 80 (07 Dec 2023 07:00) (72 - 86)    RR: 21 (07 Dec 2023 07:00) (15 - 35)  SpO2: 99% (07 Dec 2023 06:00) (97% - 100%)    O2 Parameters below as of 07 Dec 2023 06:00  Patient On (Oxygen Delivery Method): room air      CAPILLARY BLOOD GLUCOSE  POCT Blood Glucose.: 139 mg/dL (06 Dec 2023 21:23)  POCT Blood Glucose.: 118 mg/dL (06 Dec 2023 17:12)  POCT Blood Glucose.: 241 mg/dL (06 Dec 2023 12:18)  POCT Blood Glucose.: 127 mg/dL (06 Dec 2023 08:23)      POCT Blood Glucose.: 139 mg/dL (06 Dec 2023 21:23)    I&O's Summary    06 Dec 2023 07:01  -  07 Dec 2023 07:00  --------------------------------------------------------  IN: 1064 mL / OUT: 2450 mL / NET: -1386 mL        PHYSICAL EXAMINATION:  General: WN/WD NAD  HEENT: PERRLA, EOMI, moist mucous membranes  Neurology: A&Ox3, nonfocal, MOISE x 4  Respiratory: CTA B/L, normal respiratory effort, no wheezes, crackles, rales  CV: RRR, S1S2, no murmurs, rubs or gallops  Abdominal: Soft, NT, ND +BS, Last BM  Extremities: No edema, + peripheral pulses    LABS:                        12.0   8.18  )-----------( 140      ( 07 Dec 2023 00:58 )             36.9     12-07    135  |  103  |  35<H>  ----------------------------<  149<H>  4.9   |  21<L>  |  1.35<H>    Ca    10.4      07 Dec 2023 00:58  Phos  3.5     12-07  Mg     2.0     12-07    TPro  7.0  /  Alb  3.9  /  TBili  0.3  /  DBili  x   /  AST  42<H>  /  ALT  117<H>  /  AlkPhos  63  12-07    LIVER FUNCTIONS - ( 07 Dec 2023 00:58 )  Alb: 3.9 g/dL / Pro: 7.0 g/dL / ALK PHOS: 63 U/L / ALT: 117 U/L / AST: 42 U/L / GGT: x           PT/INR - ( 07 Dec 2023 00:58 )   PT: 11.4 sec;   INR: 1.09 ratio       PTT - ( 07 Dec 2023 00:58 )  PTT:67.3 sec    Urinalysis Basic - ( 07 Dec 2023 00:58 )    Color: x / Appearance: x / SG: x / pH: x  Gluc: 149 mg/dL / Ketone: x  / Bili: x / Urobili: x   Blood: x / Protein: x / Nitrite: x   Leuk Esterase: x / RBC: x / WBC x   Sq Epi: x / Non Sq Epi: x / Bacteria: x     DEVORAH VALENCIA  [id number]    HPI  57yo M w/ hx HTN, CAD w/ 1 stent in 2009, ICH (2008) presenting with abn ekg. Patient presented to [facility] where he was found to have STEMI, recommended to get cath however patient did not want to get it there so left AMA and presented to [facility] for evaluation.      INTERVAL HISTORY/OVERNIGHT EVENTS:  The patient was seen and examined at bedside. No acute events/complaints overnight. Persistent rash to bilateral upper extremities, stable. Back on status 2 per ID recs after microbe on culture ID'd as Cutibacterium which is likely contaminant.     REVIEW OF SYSTEMS:    all other ROS negative except as per HPI.     MEDICATIONS:  MEDICATIONS  (STANDING):  aspirin  chewable 81 milliGRAM(s) Oral daily  atorvastatin 80 milliGRAM(s) Oral at bedtime  budesonide  80 MICROgram(s)/formoterol 4.5 MICROgram(s) Inhaler 2 Puff(s) Inhalation two times a day  carvedilol 25 milliGRAM(s) Oral every 12 hours  chlorhexidine 2% Cloths 1 Application(s) Topical daily  heparin  Infusion 1300 Unit(s)/Hr (14 mL/Hr) IV Continuous <Continuous>  hydrALAZINE 75 milliGRAM(s) Oral three times a day  insulin glargine Injectable (LANTUS) 12 Unit(s) SubCutaneous at bedtime  insulin lispro (ADMELOG) corrective regimen sliding scale   SubCutaneous three times a day before meals  insulin lispro (ADMELOG) corrective regimen sliding scale   SubCutaneous at bedtime  insulin lispro Injectable (ADMELOG) 9 Unit(s) SubCutaneous three times a day before meals  isosorbide   dinitrate Tablet (ISORDIL) 30 milliGRAM(s) Oral three times a day  polyethylene glycol 3350 17 Gram(s) Oral two times a day  senna 2 Tablet(s) Oral at bedtime    MEDICATIONS  (PRN):  hydrOXYzine hydrochloride 25 milliGRAM(s) Oral two times a day PRN Anxiety    ALLERGIES:  Allergies    penicillins (Unknown)    OBJECTIVE:  ICU Vital Signs Last 24 Hrs  T(C): 36.7 (07 Dec 2023 03:00), Max: 36.9 (06 Dec 2023 14:00)  T(F): 98.1 (07 Dec 2023 03:00), Max: 98.4 (06 Dec 2023 14:00)  HR: 80 (07 Dec 2023 07:00) (72 - 86)    RR: 21 (07 Dec 2023 07:00) (15 - 35)  SpO2: 99% (07 Dec 2023 06:00) (97% - 100%)    O2 Parameters below as of 07 Dec 2023 06:00  Patient On (Oxygen Delivery Method): room air      CAPILLARY BLOOD GLUCOSE  POCT Blood Glucose.: 139 mg/dL (06 Dec 2023 21:23)  POCT Blood Glucose.: 118 mg/dL (06 Dec 2023 17:12)  POCT Blood Glucose.: 241 mg/dL (06 Dec 2023 12:18)  POCT Blood Glucose.: 127 mg/dL (06 Dec 2023 08:23)      POCT Blood Glucose.: 139 mg/dL (06 Dec 2023 21:23)    I&O's Summary    06 Dec 2023 07:01  -  07 Dec 2023 07:00  --------------------------------------------------------  IN: 1064 mL / OUT: 2450 mL / NET: -1386 mL      PHYSICAL EXAMINATION:  General: WN/WD NAD  HEENT: PERRLA, EOMI, moist mucous membranes  Neurology: A&Ox3, nonfocal, MOISE x 4  Respiratory: CTA B/L, normal respiratory effort, no wheezes, crackles, rales  CV: RRR, S1S2, no murmurs, rubs or gallops  Abdominal: Soft, NT, ND +BS, Last BM  Extremities: IABD in R groin; No edema, + peripheral pulses  Skin: extensive rash noted to bilateral upper extremities, no evidence of desquamation    LABS:                        12.0   8.18  )-----------( 140      ( 07 Dec 2023 00:58 )             36.9     12-07    135  |  103  |  35<H>  ----------------------------<  149<H>  4.9   |  21<L>  |  1.35<H>    Ca    10.4      07 Dec 2023 00:58  Phos  3.5     12-07  Mg     2.0     12-07    TPro  7.0  /  Alb  3.9  /  TBili  0.3  /  DBili  x   /  AST  42<H>  /  ALT  117<H>  /  AlkPhos  63  12-07    LIVER FUNCTIONS - ( 07 Dec 2023 00:58 )  Alb: 3.9 g/dL / Pro: 7.0 g/dL / ALK PHOS: 63 U/L / ALT: 117 U/L / AST: 42 U/L / GGT: x           PT/INR - ( 07 Dec 2023 00:58 )   PT: 11.4 sec;   INR: 1.09 ratio       PTT - ( 07 Dec 2023 00:58 )  PTT:67.3 sec    Urinalysis Basic - ( 07 Dec 2023 00:58 )    Color: x / Appearance: x / SG: x / pH: x  Gluc: 149 mg/dL / Ketone: x  / Bili: x / Urobili: x   Blood: x / Protein: x / Nitrite: x   Leuk Esterase: x / RBC: x / WBC x   Sq Epi: x / Non Sq Epi: x / Bacteria: x   DEVORAH VALENCIA  [id number]    HPI  57yo M w/ hx HTN, CAD w/ 1 stent in 2009, ICH (2008) presenting with abn ekg. Patient presented to [facility] where he was found to have STEMI, recommended to get cath however patient did not want to get it there so left AMA and presented to [facility] for evaluation.      INTERVAL HISTORY/OVERNIGHT EVENTS:  The patient was seen and examined at bedside. No acute events/complaints overnight. Persistent rash to bilateral upper extremities, stable. Back on status 2 per ID recs after microbe on culture ID'd as Cutibacterium which is likely contaminant.     REVIEW OF SYSTEMS:    all other ROS negative except as per HPI.     MEDICATIONS:  MEDICATIONS  (STANDING):  aspirin  chewable 81 milliGRAM(s) Oral daily  atorvastatin 80 milliGRAM(s) Oral at bedtime  budesonide  80 MICROgram(s)/formoterol 4.5 MICROgram(s) Inhaler 2 Puff(s) Inhalation two times a day  carvedilol 25 milliGRAM(s) Oral every 12 hours  chlorhexidine 2% Cloths 1 Application(s) Topical daily  heparin  Infusion 1300 Unit(s)/Hr (14 mL/Hr) IV Continuous <Continuous>  hydrALAZINE 75 milliGRAM(s) Oral three times a day  insulin glargine Injectable (LANTUS) 12 Unit(s) SubCutaneous at bedtime  insulin lispro (ADMELOG) corrective regimen sliding scale   SubCutaneous three times a day before meals  insulin lispro (ADMELOG) corrective regimen sliding scale   SubCutaneous at bedtime  insulin lispro Injectable (ADMELOG) 9 Unit(s) SubCutaneous three times a day before meals  isosorbide   dinitrate Tablet (ISORDIL) 30 milliGRAM(s) Oral three times a day  polyethylene glycol 3350 17 Gram(s) Oral two times a day  senna 2 Tablet(s) Oral at bedtime    MEDICATIONS  (PRN):  hydrOXYzine hydrochloride 25 milliGRAM(s) Oral two times a day PRN Anxiety    ALLERGIES:  Allergies    penicillins (Unknown)    OBJECTIVE:  ICU Vital Signs Last 24 Hrs  T(C): 36.7 (07 Dec 2023 03:00), Max: 36.9 (06 Dec 2023 14:00)  T(F): 98.1 (07 Dec 2023 03:00), Max: 98.4 (06 Dec 2023 14:00)  HR: 80 (07 Dec 2023 07:00) (72 - 86)    RR: 21 (07 Dec 2023 07:00) (15 - 35)  SpO2: 99% (07 Dec 2023 06:00) (97% - 100%)    O2 Parameters below as of 07 Dec 2023 06:00  Patient On (Oxygen Delivery Method): room air      CAPILLARY BLOOD GLUCOSE  POCT Blood Glucose.: 139 mg/dL (06 Dec 2023 21:23)  POCT Blood Glucose.: 118 mg/dL (06 Dec 2023 17:12)  POCT Blood Glucose.: 241 mg/dL (06 Dec 2023 12:18)  POCT Blood Glucose.: 127 mg/dL (06 Dec 2023 08:23)      POCT Blood Glucose.: 139 mg/dL (06 Dec 2023 21:23)    I&O's Summary    06 Dec 2023 07:01  -  07 Dec 2023 07:00  --------------------------------------------------------  IN: 1064 mL / OUT: 2450 mL / NET: -1386 mL      PHYSICAL EXAMINATION:  General: WN/WD NAD  HEENT: PERRLA, EOMI, moist mucous membranes  Neurology: A&Ox3, nonfocal, MOISE x 4  Respiratory: CTA B/L, normal respiratory effort, no wheezes, crackles, rales  CV: RRR, S1S2, no murmurs, rubs or gallops  Abdominal: Soft, NT, ND +BS, Last BM  Extremities: IABD in R groin; No edema, + peripheral pulses  Skin: extensive rash noted to bilateral upper extremities, no evidence of desquamation    LABS:                        12.0   8.18  )-----------( 140      ( 07 Dec 2023 00:58 )             36.9     12-07    135  |  103  |  35<H>  ----------------------------<  149<H>  4.9   |  21<L>  |  1.35<H>    Ca    10.4      07 Dec 2023 00:58  Phos  3.5     12-07  Mg     2.0     12-07    TPro  7.0  /  Alb  3.9  /  TBili  0.3  /  DBili  x   /  AST  42<H>  /  ALT  117<H>  /  AlkPhos  63  12-07    LIVER FUNCTIONS - ( 07 Dec 2023 00:58 )  Alb: 3.9 g/dL / Pro: 7.0 g/dL / ALK PHOS: 63 U/L / ALT: 117 U/L / AST: 42 U/L / GGT: x           PT/INR - ( 07 Dec 2023 00:58 )   PT: 11.4 sec;   INR: 1.09 ratio       PTT - ( 07 Dec 2023 00:58 )  PTT:67.3 sec    Urinalysis Basic - ( 07 Dec 2023 00:58 )    Color: x / Appearance: x / SG: x / pH: x  Gluc: 149 mg/dL / Ketone: x  / Bili: x / Urobili: x   Blood: x / Protein: x / Nitrite: x   Leuk Esterase: x / RBC: x / WBC x   Sq Epi: x / Non Sq Epi: x / Bacteria: x

## 2023-12-07 NOTE — PROGRESS NOTE ADULT - SUBJECTIVE AND OBJECTIVE BOX
Please refer to previous dental consult note for additional information.     MEDICATIONS  (STANDING):  aspirin  chewable 81 milliGRAM(s) Oral daily  atorvastatin 80 milliGRAM(s) Oral at bedtime  budesonide  80 MICROgram(s)/formoterol 4.5 MICROgram(s) Inhaler 2 Puff(s) Inhalation two times a day  carvedilol 25 milliGRAM(s) Oral every 12 hours  chlorhexidine 2% Cloths 1 Application(s) Topical daily  clobetasol 0.05% Ointment 1 Application(s) Topical two times a day  heparin  Infusion 1300 Unit(s)/Hr (14 mL/Hr) IV Continuous <Continuous>  hydrALAZINE 75 milliGRAM(s) Oral three times a day  insulin glargine Injectable (LANTUS) 12 Unit(s) SubCutaneous at bedtime  insulin lispro (ADMELOG) corrective regimen sliding scale   SubCutaneous at bedtime  insulin lispro (ADMELOG) corrective regimen sliding scale   SubCutaneous three times a day before meals  insulin lispro Injectable (ADMELOG) 9 Unit(s) SubCutaneous three times a day before meals  isosorbide   dinitrate Tablet (ISORDIL) 30 milliGRAM(s) Oral three times a day  polyethylene glycol 3350 17 Gram(s) Oral two times a day  senna 2 Tablet(s) Oral at bedtime    MEDICATIONS  (PRN):  hydrOXYzine hydrochloride 25 milliGRAM(s) Oral two times a day PRN Anxiety      Allergies    penicillins (Unknown)    EOE:  TMJ (-) clicks                   (-) pops                   (-) Crepitus            Mandible: FROM            Facial bones and MOM: grossly intact            (-) Trismus            (-) LAD            (-) Swelling            (-) Asymmetry            (-) Palpation            (-) Dysphagia            (-) LOC    IOE:  permanent dentition: grossly intact          tongue/FOM: WNL          labial/buccal mucosa: WNL          (-) percussion          (-) palpation          (-) swelling           (-) mobility     Radiographs: Patient is unable to be transported by wheelchair to ED room for radiographs as per med team.     ASSESSMENT:  No clinical signs of acute odontogenic infection. No evidence of mobility, swelling, fistula or abscess. Dentition appears intact. However, odontogenic infection cannot be fully ruled out without dental radiographs. Patient dentally optimized for surgery based on limited bedside exam     PROCEDURE: Limited bedside intra/extraoral evaluation performed.     RECOMMENDATIONS:   1) Patient dentally optimized for surgery based on limited bedside exam  2) Dental F/U with outpatient dentist or [facility] clinic ([phone]) for comprehensive dental care.        Baltazar Godinez DDS #[id number] Please refer to previous dental consult note for additional information.     MEDICATIONS  (STANDING):  aspirin  chewable 81 milliGRAM(s) Oral daily  atorvastatin 80 milliGRAM(s) Oral at bedtime  budesonide  80 MICROgram(s)/formoterol 4.5 MICROgram(s) Inhaler 2 Puff(s) Inhalation two times a day  carvedilol 25 milliGRAM(s) Oral every 12 hours  chlorhexidine 2% Cloths 1 Application(s) Topical daily  clobetasol 0.05% Ointment 1 Application(s) Topical two times a day  heparin  Infusion 1300 Unit(s)/Hr (14 mL/Hr) IV Continuous <Continuous>  hydrALAZINE 75 milliGRAM(s) Oral three times a day  insulin glargine Injectable (LANTUS) 12 Unit(s) SubCutaneous at bedtime  insulin lispro (ADMELOG) corrective regimen sliding scale   SubCutaneous at bedtime  insulin lispro (ADMELOG) corrective regimen sliding scale   SubCutaneous three times a day before meals  insulin lispro Injectable (ADMELOG) 9 Unit(s) SubCutaneous three times a day before meals  isosorbide   dinitrate Tablet (ISORDIL) 30 milliGRAM(s) Oral three times a day  polyethylene glycol 3350 17 Gram(s) Oral two times a day  senna 2 Tablet(s) Oral at bedtime    MEDICATIONS  (PRN):  hydrOXYzine hydrochloride 25 milliGRAM(s) Oral two times a day PRN Anxiety      Allergies    penicillins (Unknown)    EOE:  TMJ (-) clicks                   (-) pops                   (-) Crepitus            Mandible: FROM            Facial bones and MOM: grossly intact            (-) Trismus            (-) LAD            (-) Swelling            (-) Asymmetry            (-) Palpation            (-) Dysphagia            (-) LOC    IOE:  permanent dentition: grossly intact          tongue/FOM: WNL          labial/buccal mucosa: WNL          (-) percussion          (-) palpation          (-) swelling           (-) mobility     Radiographs: Patient is unable to be transported by wheelchair to ED room for radiographs as per med team.     ASSESSMENT:  No clinical signs of acute odontogenic infection. No evidence of mobility, swelling, fistula or abscess. Dentition appears intact. However, odontogenic infection cannot be fully ruled out without dental radiographs. Patient dentally optimized for surgery based on limited bedside exam     PROCEDURE: Limited bedside intra/extraoral evaluation performed.     RECOMMENDATIONS:   1) Patient dentally optimized for surgery based on limited bedside exam  2) Dental F/U with outpatient dentist or [facility] clinic ([phone]) for comprehensive dental care.        Baltazar Godinez DDS #[id number]

## 2023-12-07 NOTE — PROGRESS NOTE ADULT - ASSESSMENT
58 male with HTN, CAD (s/p PCI 2008), HFrEF, CVA 2018, and T2DM presenting with chest pressure and unknown tachycardia that was shocked x1, C 11/1 found to have in-stent restenosis of pLAD and  of RCA with elevated RA and PA pressures and severely decreased. Admitted to CICU for management of cardiogenic shock and ADHF requiring IABP 11/1 -11/7, with hospital course c/b vfib arrest requiring reinsertion of IABP. Currently listed for transplant status 2.    Plan:  ====================== NEUROLOGY=====================  Anxiety  - No Seroquel/antipsychotics since vfib arrest and prolonged QTC  - Psych Eval, recommended SSRI, but pt. refused   - Psych recommending atarax PRN  - Continue to monitor mental status    PT/Conditioning  - Continue band exercised while on bedrest s/t IABP    ==================== RESPIRATORY======================  Acute Hypoxemic Respiratory Failure  - s/p x2 intubations for cardiogenic pulm edema and the in setting of cardiac arrest, resolved - extubated 11/10  - Currently maintaining >95% sats on room air  - Continue incentive spirometry and monitoring of sp02    Asthma  - c/w albuterol, symbicort and spiriva  - On trelegy at home  - Continue to monitor SpO2 with goal >94%    ====================CARDIOVASCULAR==================  Vfib arrest i/s/o ischemia  - Lido gtt off 11/13  - PO Amio load - total of 5g per EP complete 11/17, continue PO amio  - Amio held for rising LFTs, continue to hold  - Keep K > 4, Mag > 2.2     Cardiogenic shock requiring IABP (11/1- 11/7, 11/9-)  - Likely 2/2 NSTEMI and ADHF  - 11/1 [facility]: pLAD 100 % in-stent restenosis & mRCA, 100 %. PCWP 30. IABP placed.  - 11/1 TTE: LV dilated. EF 32 %. Regional WMAs present, mod (grade 2) LV diastolic dysfunction  - 11/2 TTE: EF 22% and + LV thrombus  - IABP swapped 11/20 to RFA, continue 1:1 support  - Off Milrinone gtt @ 7:30 am 11/11  - [place] d/c'd due to elevated K levels  - c/w coreg 25 BID for GDMT  - HIT and HAIDER sent (12/3) as per HF   - UNOS status 2 as of 12/6  - 12/5 - reduced hydralazine 100 TID to 75 TID and ISD 40 TID to 30 TID for AL reduction    NSTEMI iso stent re-occlusion of pLAD and 100%  of RCA  - EKG on admission w/ LBBB  - DAPT: c/w ASA, Brilinta d/c'd per transplant w/u  - c/w lipitor 80  - cMR deferred given necessity of IABP  - CT sx not recommending CABG, undergoing AT eval    LV thrombus  - c/w heparin gtt    ===================== RENAL =========================  Non-oliguric ARIC, resolved   - Baseline Cr: 1-1.22  - Renal US: no evidence of renal artery stenosis  - Trend BMP, lytes daily, replace as needed  - Continue Strict I/Os, avoid nephrotoxins    Mild Hyponatremia/Hyperkalemia iso ARIC  - Continue fluid restriction   - Urea powder d/c'd per renal  - Trend daily  - Continue monitoring urine output, lytes, SCr/ BUN  - Replete lytes prn with goal K >4 and Mg >2    =============== GASTROINTESTINAL===================  Constipation/ileus, resolved  - regular diet  - bowel reg prn    ===================ENDO====================  Type 2 DM  - A1c 8.3  - Continue lantus, premeal, low ISS  - continue FS    ===================HEMATOLOGIC/ONC ===================  - H/H & plts stable  - Monitor H/H and plts  - VTE PPX: heparin gtt    ==================INFECTIOUS DISEASE================  # Positive blood culture, resolved  - Repeat BCx drawn 11/30 for leukocytosis to 12k - positive on 12/4, G+ rods in anaerobic bottle, suspect contaminant  - Repeat cultures x2 sent 12/4 per transplant ID recs - no growth to date  - Vancomycin by trough (12/4-12/6)  - Final microbial ID:     # Enterococcus faecalis bacteremia, resolved  - BCx 11/17+ for enterococcus faecalis x2, Staph epi x1 (likely contaminant)- pan sensitive   - Urine cx 11/18 + enterococcus faecalis  - BCx 11/18 no growth   - IABP site swapped to RFA 11/20  - s/p Vancomycin 1g q12h (11/18-11/27)  - CT A/P negative for infectious pathology    # COVID, resolved  - Off airborne precautions 11/11    # Pre-transplant ID w/u   - Trend ID recs for serologies   - Colonoscopy 11/17 - normal   - Chest CT 11/17 - improved LLL aeration  - s/p immunizations    ==================INFECTIOUS DISEASE================  # Upper Extremity Rash  - Patient developed bilateral upper extremity rash after receiving Hepatitis A & B immunizations on 11/24  - Dermatology consulted 12/5 - not likely to be related to vanco, can continue per ID recs  - Recommend triamcinolone cream, patient refusing topical therapies at this time  - RVP repeated to rule out viral etiology, negative   58 male with HTN, CAD (s/p PCI 2008), HFrEF, CVA 2018, and T2DM presenting with chest pressure and unknown tachycardia that was shocked x1, C 11/1 found to have in-stent restenosis of pLAD and  of RCA with elevated RA and PA pressures and severely decreased. Admitted to CICU for management of cardiogenic shock and ADHF requiring IABP 11/1 -11/7, with hospital course c/b vfib arrest requiring reinsertion of IABP. Currently listed for transplant status 2.    Plan:  ====================== NEUROLOGY=====================  Anxiety  - No Seroquel/antipsychotics since vfib arrest and prolonged QTC  - Psych Eval, recommended SSRI, but pt. refused   - Psych recommending atarax PRN  - Continue to monitor mental status    PT/Conditioning  - Continue band exercised while on bedrest s/t IABP    ==================== RESPIRATORY======================  Acute Hypoxemic Respiratory Failure  - s/p x2 intubations for cardiogenic pulm edema and the in setting of cardiac arrest, resolved - extubated 11/10  - Currently maintaining >95% sats on room air  - Continue incentive spirometry and monitoring of sp02    Asthma  - c/w albuterol, symbicort and spiriva  - On trelegy at home  - Continue to monitor SpO2 with goal >94%    ====================CARDIOVASCULAR==================  Vfib arrest i/s/o ischemia  - Lido gtt off 11/13  - PO Amio load - total of 5g per EP complete 11/17, continue PO amio  - Amio held for rising LFTs, continue to hold  - Keep K > 4, Mag > 2.2     Cardiogenic shock requiring IABP (11/1- 11/7, 11/9-)  - Likely 2/2 NSTEMI and ADHF  - 11/1 [facility]: pLAD 100 % in-stent restenosis & mRCA, 100 %. PCWP 30. IABP placed.  - 11/1 TTE: LV dilated. EF 32 %. Regional WMAs present, mod (grade 2) LV diastolic dysfunction  - 11/2 TTE: EF 22% and + LV thrombus  - IABP swapped 11/20 to RFA, continue 1:1 support  - Off Milrinone gtt @ 7:30 am 11/11  - [place] d/c'd due to elevated K levels  - c/w coreg 25 BID for GDMT  - HIT and HAIDER sent (12/3) as per HF   - UNOS status 2 as of 12/6  - 12/5 - reduced hydralazine 100 TID to 75 TID and ISD 40 TID to 30 TID for AL reduction    NSTEMI iso stent re-occlusion of pLAD and 100%  of RCA  - EKG on admission w/ LBBB  - DAPT: c/w ASA, Brilinta d/c'd per transplant w/u  - c/w lipitor 80  - cMR deferred given necessity of IABP  - CT sx not recommending CABG, undergoing AT eval    LV thrombus  - c/w heparin gtt    ===================== RENAL =========================  Non-oliguric ARIC, resolved   - Baseline Cr: 1-1.22  - Renal US: no evidence of renal artery stenosis  - Trend BMP, lytes daily, replace as needed  - Continue Strict I/Os, avoid nephrotoxins    Mild Hyponatremia/Hyperkalemia iso ARIC  - Continue fluid restriction   - Urea powder d/c'd per renal  - Trend daily  - Continue monitoring urine output, lytes, SCr/ BUN  - Replete lytes prn with goal K >4 and Mg >2    =============== GASTROINTESTINAL===================  Constipation/ileus, resolved  - regular diet  - bowel reg prn    ===================ENDO====================  Type 2 DM  - A1c 8.3  - Continue lantus, premeal, low ISS  - continue FS    ===================HEMATOLOGIC/ONC ===================  - H/H & plts stable  - Monitor H/H and plts  - VTE PPX: heparin gtt    ==================INFECTIOUS DISEASE================  # Positive blood culture, resolved  - Repeat BCx drawn 11/30 for leukocytosis to 12k - positive on 12/4, G+ rods in anaerobic bottle, suspect contaminant  - Repeat cultures x2 sent 12/4 per transplant ID recs - no growth to date  - Vancomycin by trough (12/4-12/6)  - Final microbial ID:     # Enterococcus faecalis bacteremia, resolved  - BCx 11/17+ for enterococcus faecalis x2, Staph epi x1 (likely contaminant)- pan sensitive   - Urine cx 11/18 + enterococcus faecalis  - BCx 11/18 no growth   - IABP site swapped to RFA 11/20  - s/p Vancomycin 1g q12h (11/18-11/27)  - CT A/P negative for infectious pathology    # COVID, resolved  - Off airborne precautions 11/11    # Pre-transplant ID w/u   - Trend ID recs for serologies   - Colonoscopy 11/17 - normal   - Chest CT 11/17 - improved LLL aeration  - s/p immunizations    ==================INFECTIOUS DISEASE================  # Upper Extremity Rash  - Patient developed bilateral upper extremity rash after receiving Hepatitis A & B immunizations on 11/24  - Dermatology consulted 12/5 - not likely to be related to vanco, can continue per ID recs  - Recommend triamcinolone cream, patient refusing topical therapies at this time  - RVP repeated to rule out viral etiology, negative   58 male with HTN, CAD (s/p PCI 2008), HFrEF, CVA 2018, and T2DM presenting with chest pressure and unknown tachycardia that was shocked x1, C 11/1 found to have in-stent restenosis of pLAD and  of RCA with elevated RA and PA pressures and severely decreased. Admitted to CICU for management of cardiogenic shock and ADHF requiring IABP 11/1 -11/7, with hospital course c/b vfib arrest requiring reinsertion of IABP. Not recommended for ATs per HF. Currently listed for transplant status 2.    Plan:  ====================== NEUROLOGY=====================  Anxiety  - No Seroquel/antipsychotics since vfib arrest and prolonged QTC  - Psych Eval, recommended SSRI, but pt. refused   - Psych recommending atarax PRN  - Continue to monitor mental status    PT/Conditioning  - Continue band exercised while on bedrest s/t IABP    ==================== RESPIRATORY======================  Acute Hypoxemic Respiratory Failure  - s/p x2 intubations for cardiogenic pulm edema and the in setting of cardiac arrest, resolved - extubated 11/10  - Currently maintaining >95% sats on room air  - Continue incentive spirometry and monitoring of sp02    Asthma  - c/w albuterol, symbicort and spiriva  - On trelegy at home  - Continue to monitor SpO2 with goal >94%    ====================CARDIOVASCULAR==================  Vfib arrest i/s/o ischemia  - Lido gtt off 11/13  - PO Amio load - total of 5g per EP complete 11/17, continue PO amio  - Amio held for rising LFTs, continue to hold  - Keep K > 4, Mag > 2.2     Cardiogenic shock requiring IABP (11/1- 11/7, 11/9-)  - Likely 2/2 NSTEMI and ADHF  - 11/1 [facility]: pLAD 100 % in-stent restenosis & mRCA, 100 %. PCWP 30. IABP placed.  - 11/1 TTE: LV dilated. EF 32 %. Regional WMAs present, mod (grade 2) LV diastolic dysfunction  - 11/2 TTE: EF 22% and + LV thrombus  - IABP swapped 11/20 to RFA, continue 1:1 support  - Off Milrinone gtt @ 7:30 am 11/11  - [place] d/c'd due to elevated K levels  - c/w coreg 25 BID for GDMT  - HIT and HAIDER sent (12/3) as per HF   - UNOS status 2 as of 12/6  - 12/5 - reduced hydralazine 100 TID to 75 TID and ISD 40 TID to 30 TID for AL reduction    NSTEMI iso stent re-occlusion of pLAD and 100%  of RCA  - EKG on admission w/ LBBB  - DAPT: c/w ASA, Brilinta d/c'd per transplant w/u  - c/w lipitor 80  - cMR deferred given necessity of IABP  - CT sx not recommending CABG, undergoing AT eval    LV thrombus  - c/w heparin gtt    ===================== RENAL =========================  Non-oliguric ARIC, resolved   - Baseline Cr: 1-1.22  - Renal US: no evidence of renal artery stenosis  - Trend BMP, lytes daily, replace as needed  - Continue Strict I/Os, avoid nephrotoxins    Mild Hyponatremia/Hyperkalemia iso ARIC  - Continue fluid restriction   - Urea powder d/c'd per renal  - Trend daily  - Continue monitoring urine output, lytes, SCr/ BUN  - Replete lytes prn with goal K >4 and Mg >2    =============== GASTROINTESTINAL===================  Constipation/ileus, resolved  - regular diet  - bowel reg prn    ===================ENDO====================  Type 2 DM  - A1c 8.3  - Continue lantus, premeal, low ISS  - continue FS    ===================HEMATOLOGIC/ONC ===================  - H/H & plts stable  - Monitor H/H and plts  - VTE PPX: heparin gtt    ==================INFECTIOUS DISEASE================  # Positive blood culture, resolved  - Repeat BCx drawn 11/30 for leukocytosis to 12k - positive on 12/4, G+ rods in anaerobic bottle, suspect contaminant  - Repeat cultures x2 sent 12/4 per transplant ID recs - no growth to date  - Vancomycin by trough (12/4-12/6)  - Final microbial ID: Cutibacterium acnes, per ID this is likely to be a contaminant, vanc dc'd.   - HF recommending dental consult to rule out potential nidus, patient has no HEENT complaints at this time    # Enterococcus faecalis bacteremia, resolved  - BCx 11/17+ for enterococcus faecalis x2, Staph epi x1 (likely contaminant)- pan sensitive   - Urine cx 11/18 + enterococcus faecalis  - BCx 11/18 no growth   - IABP site swapped to RFA 11/20  - s/p Vancomycin 1g q12h (11/18-11/27)  - CT A/P negative for infectious pathology    # COVID, resolved  - Off airborne precautions 11/11    # Pre-transplant ID w/u   - Trend ID recs for serologies   - Colonoscopy 11/17 - normal   - Chest CT 11/17 - improved LLL aeration  - s/p immunizations    ==================INFECTIOUS DISEASE================  # Upper Extremity Rash  - Patient developed bilateral upper extremity rash after receiving Hepatitis A & B immunizations on 11/24  - Dermatology consulted 12/5 - not likely to be related to vanco, can continue per ID recs  - Recommend clobetasol 0.05% BID to affected areas, patient refusing topical therapies at this time  - RVP repeated to rule out viral etiology, negative

## 2023-12-07 NOTE — PROGRESS NOTE ADULT - PROBLEM SELECTOR PLAN 5
BCx from 11/17 with GPC in pair/chains in both bottles; Mixed cultures Staph epi and Enterococcus faecalis   Repeat cultures from 11/18; NGTD  Currently on vancomycin. Has remote Hx PCN allergy per ID but unclear as he doesn't recall reaction. Cleared for OHT listing  -s/p IABP exchange from RFA to LFA on 11/20.  - + rods in blood culture on 11/30 (reported on 12/4), restarted on vancomycin, now status 7.   -f/u repeat cultures, NTD x 48 hours   appreciated transplant ID recs.

## 2023-12-07 NOTE — PROGRESS NOTE ADULT - SUBJECTIVE AND OBJECTIVE BOX
Patient seen and examined at bedside.    Overnight Events: No acute events overnight. Denies chest pain, palpitations, or shortness of breath.       REVIEW OF SYSTEMS:  Constitutional:     [ x] negative [ ] fevers [ ] chills [ ] weight loss [ ] weight gain  HEENT:                  [ x] negative [ ] dry eyes [ ] eye irritation [ ] postnasal drip [ ] nasal congestion  CV:                         [x ] negative  [ ] chest pain [ ] orthopnea [ ] palpitations [ ] murmur  Resp:                     [ x] negative [ ] cough [ ] shortness of breath [ ] dyspnea [ ] wheezing [ ] sputum [ ]hemoptysis  GI:                          [ x] negative [ ] nausea [ ] vomiting [ ] diarrhea [ ] constipation [ ] abd pain [ ] dysphagia   :                        [ x] negative [ ] dysuria [ ] nocturia [ ] hematuria [ ] increased urinary frequency  Musculoskeletal: [ x] negative [ ] back pain [ ] myalgias [ ] arthralgias [ ] fracture  Skin:                       [ x] negative [ ] rash [ ] itch  Neurological:        [ x] negative [ ] headache [ ] dizziness [ ] syncope [ ] weakness [ ] numbness  Psychiatric:           [ x] negative [ ] anxiety [ ] depression  Endocrine:            [ x] negative [ ] diabetes [ ] thyroid problem  Heme/Lymph:      [ x] negative [ ] anemia [ ] bleeding problem  Allergic/Immune: [x ] negative [ ] itchy eyes [ ] nasal discharge [ ] hives [ ] angioedema    [x ] All other systems negative  [ ] Unable to assess ROS due to    Current Meds:  aspirin  chewable 81 milliGRAM(s) Oral daily  atorvastatin 80 milliGRAM(s) Oral at bedtime  budesonide  80 MICROgram(s)/formoterol 4.5 MICROgram(s) Inhaler 2 Puff(s) Inhalation two times a day  carvedilol 25 milliGRAM(s) Oral every 12 hours  chlorhexidine 2% Cloths 1 Application(s) Topical daily  clobetasol 0.05% Ointment 1 Application(s) Topical two times a day  heparin  Infusion 1300 Unit(s)/Hr IV Continuous <Continuous>  hydrALAZINE 75 milliGRAM(s) Oral three times a day  hydrOXYzine hydrochloride 25 milliGRAM(s) Oral two times a day PRN  insulin glargine Injectable (LANTUS) 12 Unit(s) SubCutaneous at bedtime  insulin lispro (ADMELOG) corrective regimen sliding scale   SubCutaneous three times a day before meals  insulin lispro (ADMELOG) corrective regimen sliding scale   SubCutaneous at bedtime  insulin lispro Injectable (ADMELOG) 9 Unit(s) SubCutaneous three times a day before meals  isosorbide   dinitrate Tablet (ISORDIL) 30 milliGRAM(s) Oral three times a day  polyethylene glycol 3350 17 Gram(s) Oral two times a day  senna 2 Tablet(s) Oral at bedtime      PAST MEDICAL & SURGICAL HISTORY:  HTN (hypertension)      CAD (coronary artery disease)  2009; stent      Intracranial hemorrhage  2008      Respiratory arrest  december 1st      Myocardial infarction, unspecified MI type, unspecified artery      History of coronary artery stent placement          Vitals:  T(F): 98 (12-07), Max: 98.3 (12-06)  HR: 81 (12-07) (74 - 86)  BP: --  RR: 34 (12-07)  SpO2: 97% (12-07)  I&O's Summary    06 Dec 2023 07:01  -  07 Dec 2023 07:00  --------------------------------------------------------  IN: 1078 mL / OUT: 2450 mL / NET: -1372 mL    07 Dec 2023 07:01  -  07 Dec 2023 14:08  --------------------------------------------------------  IN: 424 mL / OUT: 0 mL / NET: 424 mL        Physical Exam:  Appearance: No acute distress; well appearing  Eyes: PERRL, EOMI, pink conjunctiva  HENT: Normal oral mucosa  Cardiovascular: RRR, S1, S2, IABP in place.   Respiratory: Clear to auscultation bilaterally  Gastrointestinal: soft, non-tender, non-distended with normal bowel sounds  Musculoskeletal: No clubbing; no joint deformity   Neurologic: Non-focal  Lymphatic: No lymphadenopathy  Psychiatry: AAOx3, mood & affect appropriate  Skin: erythematous rash on bilat UE.                          12.0   8.18  )-----------( 140      ( 07 Dec 2023 00:58 )             36.9     12-07    135  |  103  |  35<H>  ----------------------------<  149<H>  4.9   |  21<L>  |  1.35<H>    Ca    10.4      07 Dec 2023 00:58  Phos  3.5     12-07  Mg     2.0     12-07    TPro  7.0  /  Alb  3.9  /  TBili  0.3  /  DBili  x   /  AST  42<H>  /  ALT  117<H>  /  AlkPhos  63  12-07    PT/INR - ( 07 Dec 2023 00:58 )   PT: 11.4 sec;   INR: 1.09 ratio         PTT - ( 07 Dec 2023 00:58 )  PTT:67.3 sec

## 2023-12-07 NOTE — PROGRESS NOTE ADULT - PROBLEM SELECTOR PLAN 4
- Cr on admission 1.2, peaked to 4--> 1.06-> 1.3 ->1.41-->1.35  - Support as above.  -monitor closely  - Appreciate CardioRenal input.

## 2023-12-07 NOTE — PROGRESS NOTE ADULT - SUBJECTIVE AND OBJECTIVE BOX
DEVORAH VALENCIA  MRN-[id number]  Patient is a 58y old  Male who presents with a chief complaint of Cardiogenic shock (06 Dec 2023 20:30)    HPI:  pt seen and approx 1:30 pm  in [facility]    57yo M w/ hx HTN, CAD w/ 1 stent in , ICH () presenting with abn ekg. Patient presented to [facility] where he was found to have STEMI, recommended to get cath however patient did not want to get it there so it left and came here.  Patient initially had cough, congestion, fever, was placed on antibiotics on .  Started feeling nauseous and had a presyncopal event after which he presented to ED last night.  Had chest pain as well.  Chest pain is midsternal.  Not currently having chest pain.  Received 4 aspirin 30 min pta. (2023 15:11)      Hospital Course:    24 HOUR EVENTS:    REVIEW OF SYSTEMS:    CONSTITUTIONAL: No weakness, fevers or chills  EYES/ENT: No visual changes;  No vertigo or throat pain   NECK: No pain or stiffness  RESPIRATORY: No cough, wheezing, hemoptysis; No shortness of breath  CARDIOVASCULAR: No chest pain or palpitations  GASTROINTESTINAL: No abdominal or epigastric pain. No nausea, vomiting, or hematemesis; No diarrhea or constipation. No melena or hematochezia.  GENITOURINARY: No dysuria, frequency or hematuria  NEUROLOGICAL: No numbness or weakness  SKIN: No itching, rashes      ICU Vital Signs Last 24 Hrs  T(C): 36.5 (07 Dec 2023 16:00), Max: 36.8 (06 Dec 2023 23:00)  T(F): 97.7 (07 Dec 2023 16:00), Max: 98.3 (06 Dec 2023 23:00)  HR: 79 (07 Dec 2023 20:00) (74 - 87)  BP: --  BP(mean): --  ABP: --  ABP(mean): --  RR: 19 (07 Dec 2023 20:00) (15 - 25)  SpO2: 96% (07 Dec 2023 16:00) (96% - 99%)    O2 Parameters below as of 07 Dec 2023 19:00  Patient On (Oxygen Delivery Method): room air            CVP(mm Hg): --  CO: --  CI: --  PA: --  PA(mean): --  PA(direct): --  PCWP: --  LA: --  RA: --  SVR: --  SVRI: --  PVR: --  PVRI: --  I&O's Summary    06 Dec 2023 07:01  -  07 Dec 2023 07:00  --------------------------------------------------------  IN: 1078 mL / OUT: 2450 mL / NET: -1372 mL    07 Dec 2023 07:01  -  07 Dec 2023 20:10  --------------------------------------------------------  IN: 642 mL / OUT: 500 mL / NET: 142 mL        CAPILLARY BLOOD GLUCOSE    CAPILLARY BLOOD GLUCOSE      POCT Blood Glucose.: 170 mg/dL (07 Dec 2023 17:02)      PHYSICAL EXAM:  GENERAL: No acute distress, well-developed  HEAD:  Atraumatic, Normocephalic  EYES: EOMI, PERRLA, conjunctiva and sclera clear  NECK: Supple, no lymphadenopathy, no JVD  CHEST/LUNG: CTAB; No wheezes, rales, or rhonchi  HEART: Regular rate and rhythm. Normal S1/S2. No murmurs, rubs, or gallops  ABDOMEN: Soft, non-tender, non-distended; normal bowel sounds, no organomegaly  EXTREMITIES:  2+ peripheral pulses b/l, No clubbing, cyanosis, or edema  NEUROLOGY: A&O x 3, no focal deficits  SKIN: No rashes or lesions    ============================I/O===========================   I&O's Detail    06 Dec 2023 07:01  -  07 Dec 2023 07:00  --------------------------------------------------------  IN:    Heparin: 308 mL    Oral Fluid: 770 mL  Total IN: 1078 mL    OUT:    Voided (mL): 2450 mL  Total OUT: 2450 mL    Total NET: -1372 mL      07 Dec 2023 07:01  -  07 Dec 2023 20:10  --------------------------------------------------------  IN:    Heparin: 182 mL    IV PiggyBack: 100 mL    Oral Fluid: 360 mL  Total IN: 642 mL    OUT:    Voided (mL): 500 mL  Total OUT: 500 mL    Total NET: 142 mL        ============================ LABS =========================                        12.0   8.18  )-----------( 140      ( 07 Dec 2023 00:58 )             36.9     12    135  |  103  |  35<H>  ----------------------------<  149<H>  4.9   |  21<L>  |  1.35<H>    Ca    10.4      07 Dec 2023 00:58  Phos  3.5       Mg     2.0         TPro  7.0  /  Alb  3.9  /  TBili  0.3  /  DBili  x   /  AST  42<H>  /  ALT  117<H>  /  AlkPhos  63  12                LIVER FUNCTIONS - ( 07 Dec 2023 00:58 )  Alb: 3.9 g/dL / Pro: 7.0 g/dL / ALK PHOS: 63 U/L / ALT: 117 U/L / AST: 42 U/L / GGT: x           PT/INR - ( 07 Dec 2023 00:58 )   PT: 11.4 sec;   INR: 1.09 ratio         PTT - ( 07 Dec 2023 00:58 )  PTT:67.3 sec    Lactate, Blood: 1.7 mmol/L (23 @ 00:58)  Lactate, Blood: 1.2 mmol/L (23 @ 01:49)  Lactate, Blood: 1.3 mmol/L (23 @ 02:41)    Urinalysis Basic - ( 07 Dec 2023 00:58 )    Color: x / Appearance: x / SG: x / pH: x  Gluc: 149 mg/dL / Ketone: x  / Bili: x / Urobili: x   Blood: x / Protein: x / Nitrite: x   Leuk Esterase: x / RBC: x / WBC x   Sq Epi: x / Non Sq Epi: x / Bacteria: x      ======================Micro/Rad/Cardio=================  Telemtry: Reviewed   EKG: Reviewed  CXR: Reviewed  Culture: Reviewed   Echo:   Cath: Cardiac Cath Lab - Adult:   [facility]  Department of Cardiology  [address] [place] [address]  [phone]  Cath Lab Report -- Comprehensive Report  Patient: LD VALENCIA  Study date: 2017  Account number: [id number]  MR number: [id number]  : 1965  Gender: Male  Race: W  Case Physician(s):  Jairon Holguin M.D.  Referring Physician:  Luc Lynn M.D.  INDICATIONS: Unstable angina - CCS4.  HISTORY: The patient has a history of coronary artery disease. The patient  hashypertension and medication-treated dyslipidemia.  PROCEDURE:  --  Left heart catheterization with ventriculography.  --  Left coronary angiography.  --  Right coronary angiography.  TECHNIQUE: The risks and alternatives of the procedures and conscious  sedation were explained to the patient and informed consent was obtained.  Cardiac catheterization performed electively.  Local anesthetic given. Right radial artery access. A 6FR PRELUDE KIT was  inserted in the vessel. Left heart catheterization. Ventriculography was  performed. A 5FR FR4.0 EXPO catheter was utilized. Left coronary artery  angiography. The vessel was injected utilizing a 5FR FL3.5 EXPO catheter.  Right coronary artery angiography. The vessel was injected utilizing a 5FR  FR4.0 EXPO catheter. RADIATION EXPOSURE: 1.1 min.  CONTRAST GIVEN: Omnipaque 55 ml.  MEDICATIONS GIVEN: Midazolam, 1 mg, IV. Fentanyl, 25 mcg, IV. Verapamil  (Isoptin, Calan, Covera), 2.5 mg, IA. Heparin, 3000 units, IA.  VENTRICLES: Global left ventricular function was moderately depressed. EF  estimated was 40 %.  CORONARY VESSELS: The coronary circulation is right dominant.  LM:   --  LM: Normal.  LAD:   --  Proximal LAD: There was a 50 % stenosis.  CX:   --  Circumflex: Normal.  RCA:   --  Mid RCA: There was a 40 % stenosis.  --  Distal RCA: There was a 50 % stenosis.  COMPLICATIONS: There were no complications.  DIAGNOSTIC RECOMMENDATIONS: The patient should continue with the present  medications.  Prepared and signed by  Jairon Holguin M.D.  Signed 2017 12:20:13  HEMODYNAMIC TABLES  Pressures:  Baseline/ Room Air  Pressures:  - HR: 78  Pressures:  - Rhythm:  Pressures:  -- Aortic Pressure (S/D/M): --/--/99  Pressures:  -- Left Ventricle (s/edp): 157/39/--  Outputs:  Baseline/ Room Air  Outputs:  -- CALCULATIONS[age]  Outputs:  -- CALCULATIONS: Body Surface Area: 2.05  Outputs:  -- CALCULATIONS: Height in cm: 175.00  Outputs:  -- CALCULATIONS: Sex: Male  Outputs:  -- CALCULATIONS: Weight in k.40 (17 @ 21:55)    ======================================================  PAST MEDICAL & SURGICAL HISTORY:  HTN (hypertension)      CAD (coronary artery disease)  ; stent      Intracranial hemorrhage        Respiratory arrest        Myocardial infarction, unspecified MI type, unspecified artery      History of coronary artery stent placement  ====================ASSESSMENT ==============  58 male with HTN, CAD (s/p PCI ), HFrEF, CVA , and T2DM presenting with chest pressure and unknown tachycardia that was shocked x1, [facility]  found to have in-stent restenosis of pLAD and  of RCA with elevated RA and PA pressures and severely decreased. Admitted to CICU for management of cardiogenic shock and ADHF requiring IABP  -, with hospital course c/b vfib arrest requiring reinsertion of IABP. Not recommended for ATs per HF. Currently listed for transplant status 2.    Plan:  ====================== NEUROLOGY=====================  Anxiety  - No Seroquel/antipsychotics since vfib arrest and prolonged QTC  - Psych Eval, recommended SSRI, but pt. refused   - Psych recommending atarax PRN  - Continue to monitor mental status    PT/Conditioning  - Continue band exercised while on bedrest s/t IABP    ==================== RESPIRATORY======================  Acute Hypoxemic Respiratory Failure  - s/p x2 intubations for cardiogenic pulm edema and the in setting of cardiac arrest, resolved - extubated 11/10  - Currently maintaining >95% sats on room air  - Continue incentive spirometry and monitoring of sp02    Asthma  - c/w albuterol, symbicort and spiriva  - On trelegy at home  - Continue to monitor SpO2 with goal >94%    ====================CARDIOVASCULAR==================  Vfib arrest i/s/o ischemia  - Lido gtt off   - PO Amio load - total of 5g per EP complete , continue PO amio  - Amio held for rising LFTs, continue to hold  - Keep K > 4, Mag > 2.2     Cardiogenic shock requiring IABP (- , -)  - Likely 2/2 NSTEMI and ADHF  -  LHC: pLAD 100 % in-stent restenosis & mRCA, 100 %. PCWP 30. IABP placed.  -  TTE: LV dilated. EF 32 %. Regional WMAs present, mod (grade 2) LV diastolic dysfunction  -  TTE: EF 22% and + LV thrombus  - IABP swapped  to RFA, continue 1:1 support  - Off Milrinone gtt @ 7:30 am   - [place] d/c'd due to elevated K levels  - c/w coreg 25 BID for GDMT  - HIT and HAIDER sent (12/3) as per HF   - UNOS status 2 as of   -  - reduced hydralazine 100 TID to 75 TID and ISD 40 TID to 30 TID for AL reduction    NSTEMI iso stent re-occlusion of pLAD and 100%  of RCA  - EKG on admission w/ LBBB  - DAPT: c/w ASA, Brilinta d/c'd per transplant w/u  - c/w lipitor 80  - cMR deferred given necessity of IABP  - CT sx not recommending CABG, undergoing AT eval    LV thrombus  - c/w heparin gtt    ===================== RENAL =========================  Non-oliguric ARIC, resolved   - Baseline Cr: 1-  - Renal US: no evidence of renal artery stenosis  - Trend BMP, lytes daily, replace as needed  - Continue Strict I/Os, avoid nephrotoxins    Mild Hyponatremia/Hyperkalemia iso ARIC  - Continue fluid restriction   - Urea powder d/c'd per renal  - Trend daily  - Continue monitoring urine output, lytes, SCr/ BUN  - Replete lytes prn with goal K >4 and Mg >2    =============== GASTROINTESTINAL===================  Constipation/ileus, resolved  - regular diet  - bowel reg prn    ===================ENDO====================  Type 2 DM  - A1c 8.3  - Continue lantus, premeal, low ISS  - continue FS    ===================HEMATOLOGIC/ONC ===================  - H/H & plts stable  - Monitor H/H and plts  - VTE PPX: heparin gtt    ==================INFECTIOUS DISEASE================  # Positive blood culture, resolved  - Repeat BCx drawn  for leukocytosis to 12k - positive on , G+ rods in anaerobic bottle, suspect contaminant  - Repeat cultures x2 sent  per transplant ID recs - no growth to date  - Vancomycin by trough (-)  - Final microbial ID: Cutibacterium acnes, per ID this is likely to be a contaminant, vanc dc'd.   - HF recommending dental consult to rule out potential nidus, patient has no HEENT complaints at this time    # Enterococcus faecalis bacteremia, resolved  - BCx + for enterococcus faecalis x2, Staph epi x1 (likely contaminant)- pan sensitive   - Urine cx  + enterococcus faecalis  - BCx  no growth   - IABP site swapped to RFA   - s/p Vancomycin 1g q12h (-)  - CT A/P negative for infectious pathology    # COVID, resolved  - Off airborne precautions     # Pre-transplant ID w/u   - Trend ID recs for serologies   - Colonoscopy  - normal   - Chest CT  - improved LLL aeration  - s/p immunizations    ==================INFECTIOUS DISEASE================  # Upper Extremity Rash  - Patient developed bilateral upper extremity rash after receiving Hepatitis A & B immunizations on   - Dermatology consulted  - not likely to be related to vanco, can continue per ID recs  - Recommend clobetasol 0.05% BID to affected areas, patient refusing topical therapies at this time  - RVP repeated to rule out viral etiology, negative  Patient requires continuous monitoring with bedside rhythm monitoring, pulse ox monitoring, and intermittent blood gas analysis. Care plan discussed with ICU care team. Patient remained critical and at risk for life threatening decompensation.  Patient seen, examined and plan discussed with CCU team during rounds.     I have personally provided ____ minutes of critical care time excluding time spent on separate procedures, in addition to initial critical care time provided by the CICU Attending, Dr. Lees.    By signing my name below, I, Kalyn Sousa, attest that this documentation has been prepared under the direction and in the presence of Rita Hawthorne NP.  Electronically signed: Rosalba Booth, 23 @ 20:10    I, Rita Hawthorne , personally performed the services described in this documentation. all medical record entries made by the scribe were at my direction and in my presence. I have reviewed the chart and agree that the record reflects my personal performance and is accurate and complete  Electronically signed: Rita Hawthorne NP.       DEVORAH VALENCIA  MRN-[id number]  Patient is a 58y old  Male who presents with a chief complaint of Cardiogenic shock (06 Dec 2023 20:30)    HPI:  pt seen and approx 1:30 pm  in [facility]    57yo M w/ hx HTN, CAD w/ 1 stent in , ICH () presenting with abn ekg. Patient presented to [facility] where he was found to have STEMI, recommended to get cath however patient did not want to get it there so it left and came here.  Patient initially had cough, congestion, fever, was placed on antibiotics on .  Started feeling nauseous and had a presyncopal event after which he presented to ED last night.  Had chest pain as well.  Chest pain is midsternal.  Not currently having chest pain.  Received 4 aspirin 30 min pta. (2023 15:11)      Hospital Course:    24 HOUR EVENTS:    REVIEW OF SYSTEMS:    CONSTITUTIONAL: No weakness, fevers or chills  EYES/ENT: No visual changes;  No vertigo or throat pain   NECK: No pain or stiffness  RESPIRATORY: No cough, wheezing, hemoptysis; No shortness of breath  CARDIOVASCULAR: No chest pain or palpitations  GASTROINTESTINAL: No abdominal or epigastric pain. No nausea, vomiting, or hematemesis; No diarrhea or constipation. No melena or hematochezia.  GENITOURINARY: No dysuria, frequency or hematuria  NEUROLOGICAL: No numbness or weakness  SKIN: No itching, rashes      ICU Vital Signs Last 24 Hrs  T(C): 36.5 (07 Dec 2023 16:00), Max: 36.8 (06 Dec 2023 23:00)  T(F): 97.7 (07 Dec 2023 16:00), Max: 98.3 (06 Dec 2023 23:00)  HR: 79 (07 Dec 2023 20:00) (74 - 87)  BP: --  BP(mean): --  ABP: --  ABP(mean): --  RR: 19 (07 Dec 2023 20:00) (15 - 25)  SpO2: 96% (07 Dec 2023 16:00) (96% - 99%)    O2 Parameters below as of 07 Dec 2023 19:00  Patient On (Oxygen Delivery Method): room air            CVP(mm Hg): --  CO: --  CI: --  PA: --  PA(mean): --  PA(direct): --  PCWP: --  LA: --  RA: --  SVR: --  SVRI: --  PVR: --  PVRI: --  I&O's Summary    06 Dec 2023 07:01  -  07 Dec 2023 07:00  --------------------------------------------------------  IN: 1078 mL / OUT: 2450 mL / NET: -1372 mL    07 Dec 2023 07:01  -  07 Dec 2023 20:10  --------------------------------------------------------  IN: 642 mL / OUT: 500 mL / NET: 142 mL        CAPILLARY BLOOD GLUCOSE    CAPILLARY BLOOD GLUCOSE      POCT Blood Glucose.: 170 mg/dL (07 Dec 2023 17:02)      PHYSICAL EXAM:  GENERAL: No acute distress, well-developed  HEAD:  Atraumatic, Normocephalic  EYES: EOMI, PERRLA, conjunctiva and sclera clear  NECK: Supple, no lymphadenopathy, no JVD  CHEST/LUNG: CTAB; No wheezes, rales, or rhonchi  HEART: Regular rate and rhythm. Normal S1/S2. No murmurs, rubs, or gallops  ABDOMEN: Soft, non-tender, non-distended; normal bowel sounds, no organomegaly  EXTREMITIES:  2+ peripheral pulses b/l, No clubbing, cyanosis, or edema  NEUROLOGY: A&O x 3, no focal deficits  SKIN: No rashes or lesions    ============================I/O===========================   I&O's Detail    06 Dec 2023 07:01  -  07 Dec 2023 07:00  --------------------------------------------------------  IN:    Heparin: 308 mL    Oral Fluid: 770 mL  Total IN: 1078 mL    OUT:    Voided (mL): 2450 mL  Total OUT: 2450 mL    Total NET: -1372 mL      07 Dec 2023 07:01  -  07 Dec 2023 20:10  --------------------------------------------------------  IN:    Heparin: 182 mL    IV PiggyBack: 100 mL    Oral Fluid: 360 mL  Total IN: 642 mL    OUT:    Voided (mL): 500 mL  Total OUT: 500 mL    Total NET: 142 mL        ============================ LABS =========================                        12.0   8.18  )-----------( 140      ( 07 Dec 2023 00:58 )             36.9     12    135  |  103  |  35<H>  ----------------------------<  149<H>  4.9   |  21<L>  |  1.35<H>    Ca    10.4      07 Dec 2023 00:58  Phos  3.5       Mg     2.0         TPro  7.0  /  Alb  3.9  /  TBili  0.3  /  DBili  x   /  AST  42<H>  /  ALT  117<H>  /  AlkPhos  63  12                LIVER FUNCTIONS - ( 07 Dec 2023 00:58 )  Alb: 3.9 g/dL / Pro: 7.0 g/dL / ALK PHOS: 63 U/L / ALT: 117 U/L / AST: 42 U/L / GGT: x           PT/INR - ( 07 Dec 2023 00:58 )   PT: 11.4 sec;   INR: 1.09 ratio         PTT - ( 07 Dec 2023 00:58 )  PTT:67.3 sec    Lactate, Blood: 1.7 mmol/L (23 @ 00:58)  Lactate, Blood: 1.2 mmol/L (23 @ 01:49)  Lactate, Blood: 1.3 mmol/L (23 @ 02:41)    Urinalysis Basic - ( 07 Dec 2023 00:58 )    Color: x / Appearance: x / SG: x / pH: x  Gluc: 149 mg/dL / Ketone: x  / Bili: x / Urobili: x   Blood: x / Protein: x / Nitrite: x   Leuk Esterase: x / RBC: x / WBC x   Sq Epi: x / Non Sq Epi: x / Bacteria: x      ======================Micro/Rad/Cardio=================  Telemtry: Reviewed   EKG: Reviewed  CXR: Reviewed  Culture: Reviewed   Echo:   Cath: Cardiac Cath Lab - Adult:   [facility]  Department of Cardiology  [address] [place] [address]  [phone]  Cath Lab Report -- Comprehensive Report  Patient: LD VALENCIA  Study date: 2017  Account number: [id number]  MR number: [id number]  : 1965  Gender: Male  Race: W  Case Physician(s):  Jairon Holguin M.D.  Referring Physician:  Luc Lynn M.D.  INDICATIONS: Unstable angina - CCS4.  HISTORY: The patient has a history of coronary artery disease. The patient  hashypertension and medication-treated dyslipidemia.  PROCEDURE:  --  Left heart catheterization with ventriculography.  --  Left coronary angiography.  --  Right coronary angiography.  TECHNIQUE: The risks and alternatives of the procedures and conscious  sedation were explained to the patient and informed consent was obtained.  Cardiac catheterization performed electively.  Local anesthetic given. Right radial artery access. A 6FR PRELUDE KIT was  inserted in the vessel. Left heart catheterization. Ventriculography was  performed. A 5FR FR4.0 EXPO catheter was utilized. Left coronary artery  angiography. The vessel was injected utilizing a 5FR FL3.5 EXPO catheter.  Right coronary artery angiography. The vessel was injected utilizing a 5FR  FR4.0 EXPO catheter. RADIATION EXPOSURE: 1.1 min.  CONTRAST GIVEN: Omnipaque 55 ml.  MEDICATIONS GIVEN: Midazolam, 1 mg, IV. Fentanyl, 25 mcg, IV. Verapamil  (Isoptin, Calan, Covera), 2.5 mg, IA. Heparin, 3000 units, IA.  VENTRICLES: Global left ventricular function was moderately depressed. EF  estimated was 40 %.  CORONARY VESSELS: The coronary circulation is right dominant.  LM:   --  LM: Normal.  LAD:   --  Proximal LAD: There was a 50 % stenosis.  CX:   --  Circumflex: Normal.  RCA:   --  Mid RCA: There was a 40 % stenosis.  --  Distal RCA: There was a 50 % stenosis.  COMPLICATIONS: There were no complications.  DIAGNOSTIC RECOMMENDATIONS: The patient should continue with the present  medications.  Prepared and signed by  Jairon Holguin M.D.  Signed 2017 12:20:13  HEMODYNAMIC TABLES  Pressures:  Baseline/ Room Air  Pressures:  - HR: 78  Pressures:  - Rhythm:  Pressures:  -- Aortic Pressure (S/D/M): --/--/99  Pressures:  -- Left Ventricle (s/edp): 157/39/--  Outputs:  Baseline/ Room Air  Outputs:  -- CALCULATIONS[age]  Outputs:  -- CALCULATIONS: Body Surface Area: 2.05  Outputs:  -- CALCULATIONS: Height in cm: 175.00  Outputs:  -- CALCULATIONS: Sex: Male  Outputs:  -- CALCULATIONS: Weight in k.40 (17 @ 21:55)    ======================================================  PAST MEDICAL & SURGICAL HISTORY:  HTN (hypertension)      CAD (coronary artery disease)  ; stent      Intracranial hemorrhage        Respiratory arrest        Myocardial infarction, unspecified MI type, unspecified artery      History of coronary artery stent placement  ====================ASSESSMENT ==============  58 male with HTN, CAD (s/p PCI ), HFrEF, CVA , and T2DM presenting with chest pressure and unknown tachycardia that was shocked x1, [facility]  found to have in-stent restenosis of pLAD and  of RCA with elevated RA and PA pressures and severely decreased. Admitted to CICU for management of cardiogenic shock and ADHF requiring IABP  -, with hospital course c/b vfib arrest requiring reinsertion of IABP. Not recommended for ATs per HF. Currently listed for transplant status 2.    Plan:  ====================== NEUROLOGY=====================  Anxiety  - No Seroquel/antipsychotics since vfib arrest and prolonged QTC  - Psych Eval, recommended SSRI, but pt. refused   - Psych recommending atarax PRN  - Continue to monitor mental status    PT/Conditioning  - Continue band exercised while on bedrest s/t IABP    ==================== RESPIRATORY======================  Acute Hypoxemic Respiratory Failure  - s/p x2 intubations for cardiogenic pulm edema and the in setting of cardiac arrest, resolved - extubated 11/10  - Currently maintaining >95% sats on room air  - Continue incentive spirometry and monitoring of sp02    Asthma  - c/w albuterol, symbicort and spiriva  - On trelegy at home  - Continue to monitor SpO2 with goal >94%    ====================CARDIOVASCULAR==================  Vfib arrest i/s/o ischemia  - Lido gtt off   - PO Amio load - total of 5g per EP complete , continue PO amio  - Amio held for rising LFTs, continue to hold  - Keep K > 4, Mag > 2.2     Cardiogenic shock requiring IABP (- , -)  - Likely 2/2 NSTEMI and ADHF  -  LHC: pLAD 100 % in-stent restenosis & mRCA, 100 %. PCWP 30. IABP placed.  -  TTE: LV dilated. EF 32 %. Regional WMAs present, mod (grade 2) LV diastolic dysfunction  -  TTE: EF 22% and + LV thrombus  - IABP swapped  to RFA, continue 1:1 support  - Off Milrinone gtt @ 7:30 am   - [place] d/c'd due to elevated K levels  - c/w coreg 25 BID for GDMT  - HIT and HAIDER sent (12/3) as per HF   - UNOS status 2 as of   -  - reduced hydralazine 100 TID to 75 TID and ISD 40 TID to 30 TID for AL reduction    NSTEMI iso stent re-occlusion of pLAD and 100%  of RCA  - EKG on admission w/ LBBB  - DAPT: c/w ASA, Brilinta d/c'd per transplant w/u  - c/w lipitor 80  - cMR deferred given necessity of IABP  - CT sx not recommending CABG, undergoing AT eval    LV thrombus  - c/w heparin gtt    ===================== RENAL =========================  Non-oliguric ARIC, resolved   - Baseline Cr: 1-  - Renal US: no evidence of renal artery stenosis  - Trend BMP, lytes daily, replace as needed  - Continue Strict I/Os, avoid nephrotoxins    Mild Hyponatremia/Hyperkalemia iso ARIC  - Continue fluid restriction   - Urea powder d/c'd per renal  - Trend daily  - Continue monitoring urine output, lytes, SCr/ BUN  - Replete lytes prn with goal K >4 and Mg >2    =============== GASTROINTESTINAL===================  Constipation/ileus, resolved  - regular diet  - bowel reg prn    ===================ENDO====================  Type 2 DM  - A1c 8.3  - Continue lantus, premeal, low ISS  - continue FS    ===================HEMATOLOGIC/ONC ===================  - H/H & plts stable  - Monitor H/H and plts  - VTE PPX: heparin gtt    ==================INFECTIOUS DISEASE================  # Positive blood culture, resolved  - Repeat BCx drawn  for leukocytosis to 12k - positive on , G+ rods in anaerobic bottle, suspect contaminant  - Repeat cultures x2 sent  per transplant ID recs - no growth to date  - Vancomycin by trough (-)  - Final microbial ID: Cutibacterium acnes, per ID this is likely to be a contaminant, vanc dc'd.   - HF recommending dental consult to rule out potential nidus, patient has no HEENT complaints at this time    # Enterococcus faecalis bacteremia, resolved  - BCx + for enterococcus faecalis x2, Staph epi x1 (likely contaminant)- pan sensitive   - Urine cx  + enterococcus faecalis  - BCx  no growth   - IABP site swapped to RFA   - s/p Vancomycin 1g q12h (-)  - CT A/P negative for infectious pathology    # COVID, resolved  - Off airborne precautions     # Pre-transplant ID w/u   - Trend ID recs for serologies   - Colonoscopy  - normal   - Chest CT  - improved LLL aeration  - s/p immunizations    ==================INFECTIOUS DISEASE================  # Upper Extremity Rash  - Patient developed bilateral upper extremity rash after receiving Hepatitis A & B immunizations on   - Dermatology consulted  - not likely to be related to vanco, can continue per ID recs  - Recommend clobetasol 0.05% BID to affected areas, patient refusing topical therapies at this time  - RVP repeated to rule out viral etiology, negative  Patient requires continuous monitoring with bedside rhythm monitoring, pulse ox monitoring, and intermittent blood gas analysis. Care plan discussed with ICU care team. Patient remained critical and at risk for life threatening decompensation.  Patient seen, examined and plan discussed with CCU team during rounds.     I have personally provided ____ minutes of critical care time excluding time spent on separate procedures, in addition to initial critical care time provided by the CICU Attending, Dr. Lees.    By signing my name below, I, Kalyn Sousa, attest that this documentation has been prepared under the direction and in the presence of Rita Hawthorne NP.  Electronically signed: Rosalba Booth, 23 @ 20:10    I, Rita Hawthorne , personally performed the services described in this documentation. all medical record entries made by the scribe were at my direction and in my presence. I have reviewed the chart and agree that the record reflects my personal performance and is accurate and complete  Electronically signed: Rita Hawthorne NP.       DEVORAH VALENCIA  MRN-[id number]  Patient is a 58y old  Male who presents with a chief complaint of Cardiogenic shock (06 Dec 2023 20:30)    HPI: 58M with HFrEF (LVIDd 6.4 cm, LVEF 32%), CAD s/p PCI (), HTN, DMT2 (A1c 8.3%) and CVA s/p TPA (), recently treated for PNA who initially presented to [facility] via EMS after syncope reportedly requiring defibrilation. Treated for ACS but left AMA to come to [facility]. On arrival ECG with ST depression in lateral leads. Intubated  for respiratory failure. Found to have COVID. L/RHC evealing  of LAD and RCA, elevated filling pressures and CI of 1.5 prompting placement of IABP. Extubated 11/3. IABP was weaned to off , then the following day on , developed PMVT cardiac arrest with prompt CPR and defibrillation, started on IV Lidocaine and Amio. IABP ultimately replaced on  for worsening hypotension. TTE  revealing LV thrombus.     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 AT evaluation launched 11/10, he is ABO A. He was discussed during TSC on  and was approved for listing, however was found to be Bacteremic with  Blc Cx growing mixed cultures Staph epi and Enterococcus faecalis and started on IV Vanco. Repeat cultures from  reveal NGTD and therefore was cleared by ID for listing.     He appears adequately supported on IABP at 1:1, electrically quiescent on oral Amiodarone. Cr is improving with holding diuretics. Labs otherwise notable for worsening thrombocytopenia. Awaiting suitable donor. Now with GM + rods in blood culture on  (reported on ), restarted on vancomycin, and status 7, repeat cultures now negative x48 hours (),  now status 2 again.  c/b bilat UE pruritic rash.      Cardiac Studies  23 TTE: limited unable to rule out endocarditis, technically difficult study  1123 TTE: LVEF 22% (global), LV thrombus, normal RV size and function, mild MR, trace TR  23  LHC showed pLAD 70 % stenosis in the ostium portion of the segment and 100 % in-stent restenosis and in mRCA, 100 % stenosis.   23 RHC; RA 23 Vw 24, PA 52/33/40, PCWP 30 Vw 33, AO 85/66/73, PA sat 54.4%, CO/CI (F) 2.96/1.44, IABP was placed during the cath through R common femoral artery.   23 TTE: LVIDd 6.4cm , LVEF 32%, regional WMA, grade II DD,  TAPSE 1.7cm, mld MR, trace TR,     Bedside hemodynamics   IABP 1:1: CVP 3, PA  33-36/12 mean PA 20-23 PCWP 15-16, MVO2 72.0%, CO/CI 6.2/2.8,   dsc? (PVR 0.8-1.1 [place]ina calculated by me)   11/3: IABP 1:1 RA 5; PA 27/12/18; CO/CI 5.6/2.6; MAP 90-100s.  23 IABP 1:3 CVP 4 PA 37/18 SvO2 83.8 CO/CI 11.2 CI 5.1  (in the setting of fever to 38.3C)   IABP 1:1 CVP 3 PA 48/27 SvO2 78.4% CO 8.2 CI 3.7    (IABP 1:1): CVP 1, PA 33/5/16, MvO2 74.3%    REVIEW OF SYSTEMS:  CONSTITUTIONAL: No weakness, fevers or chills  EYES/ENT: No visual changes;  No vertigo or throat pain   NECK: No pain or stiffness  RESPIRATORY: No cough, wheezing, hemoptysis; No shortness of breath  CARDIOVASCULAR: No chest pain or palpitations  GASTROINTESTINAL: No abdominal or epigastric pain  No nausea, vomiting, or hematemesis; No diarrhea or constipation. No melena or hematochezia.  GENITOURINARY: No dysuria, frequency or hematuria  NEUROLOGICAL: No numbness or weakness  SKIN: No itching, rashes      ICU Vital Signs Last 24 Hrs  T(C): 36.5 (07 Dec 2023 16:00), Max: 36.8 (06 Dec 2023 23:00)  T(F): 97.7 (07 Dec 2023 16:00), Max: 98.3 (06 Dec 2023 23:00)  HR: 79 (07 Dec 2023 20:00) (74 - 87)  RR: 19 (07 Dec 2023 20:00) (15 - 25)  SpO2: 96% (07 Dec 2023 16:00) (96% - 99%)    O2 Parameters below as of 07 Dec 2023 19:00  Patient On (Oxygen Delivery Method): room air        I&O's Summary    06 Dec 2023 07:01  -  07 Dec 2023 07:00  --------------------------------------------------------  IN: 1078 mL / OUT: 2450 mL / NET: -1372 mL    07 Dec 2023 07:01  -  07 Dec 2023 20:10  --------------------------------------------------------  IN: 642 mL / OUT: 500 mL / NET: 142 mL      CAPILLARY BLOOD GLUCOSE  POCT Blood Glucose.: 170 mg/dL (07 Dec 2023 17:02)  ============================I/O===========================   I&O's Detail    06 Dec 2023 07:01  -  07 Dec 2023 07:00  --------------------------------------------------------  IN:    Heparin: 308 mL    Oral Fluid: 770 mL  Total IN: 1078 mL    OUT:    Voided (mL): 2450 mL  Total OUT: 2450 mL    Total NET: -1372 mL      07 Dec 2023 07:01  -  07 Dec 2023 20:10  --------------------------------------------------------  IN:    Heparin: 182 mL    IV PiggyBack: 100 mL    Oral Fluid: 360 mL  Total IN: 642 mL    OUT:    Voided (mL): 500 mL  Total OUT: 500 mL    Total NET: 142 mL  ============================ LABS =========================                      12.0   8.18  )-----------( 140      ( 07 Dec 2023 00:58 )             36.9         135  |  103  |  35<H>  ----------------------------<  149<H>  4.9   |  21<L>  |  1.35<H>    Ca    10.4      07 Dec 2023 00:58  Phos  3.5       Mg     2.0         TPro  7.0  /  Alb  3.9  /  TBili  0.3  /  DBili  x   /  AST  42<H>  /  ALT  117<H>  /  AlkPhos  63      LIVER FUNCTIONS - ( 07 Dec 2023 00:58 )  Alb: 3.9 g/dL / Pro: 7.0 g/dL / ALK PHOS: 63 U/L / ALT: 117 U/L / AST: 42 U/L / GGT: x           PT/INR - ( 07 Dec 2023 00:58 )   PT: 11.4 sec;   INR: 1.09 ratio    PTT - ( 07 Dec 2023 00:58 )  PTT:67.3 sec    Lactate, Blood: 1.7 mmol/L (23 @ 00:58)  Lactate, Blood: 1.2 mmol/L (23 @ 01:49)  Lactate, Blood: 1.3 mmol/L (23 @ 02:41)    Urinalysis Basic - ( 07 Dec 2023 00:58 )    Color: x / Appearance: x / SG: x / pH: x  Gluc: 149 mg/dL / Ketone: x  / Bili: x / Urobili: x   Blood: x / Protein: x / Nitrite: x   Leuk Esterase: x / RBC: x / WBC x   Sq Epi: x / Non Sq Epi: x / Bacteria: x  ======================Micro/Rad/Cardio=================  Telemtry: Reviewed   EKG: Reviewed  CXR: Reviewed  Culture: Reviewed   Echo:   Cath: Cardiac Cath Lab - Adult:   [facility]  Department of Cardiology  [address] [place] [id number]  [phone]  Cath Lab Report -- Comprehensive Report  Patient: LD VALENCIA  Study date: 2017  Account number: [id number]  MR number: [id number]  : 1965  Gender: Male  Race: W  Case Physician(s):  Jairon Holguin M.D.  Referring Physician:  Luc Lynn M.D.  INDICATIONS: Unstable angina - CCS4.  HISTORY: The patient has a history of coronary artery disease. The patient  hashypertension and medication-treated dyslipidemia.  PROCEDURE:  --  Left heart catheterization with ventriculography.  --  Left coronary angiography.  --  Right coronary angiography.  TECHNIQUE: The risks and alternatives of the procedures and conscious  sedation were explained to the patient and informed consent was obtained.  Cardiac catheterization performed electively.  Local anesthetic given. Right radial artery access. A 6FR PRELUDE KIT was  inserted in the vessel. Left heart catheterization. Ventriculography was  performed. A 5FR FR4.0 EXPO catheter was utilized. Left coronary artery  angiography. The vessel was injected utilizing a 5FR FL3.5 EXPO catheter.  Right coronary artery angiography. The vessel was injected utilizing a 5FR  FR4.0 EXPO catheter. RADIATION EXPOSURE: 1.1 min.  CONTRAST GIVEN: Omnipaque 55 ml.  MEDICATIONS GIVEN: Midazolam, 1 mg, IV. Fentanyl, 25 mcg, IV. Verapamil  (Isoptin, Calan, Covera), 2.5 mg, IA. Heparin, 3000 units, IA.  VENTRICLES: Global left ventricular function was moderately depressed. EF  estimated was 40 %.  CORONARY VESSELS: The coronary circulation is right dominant.  LM:   --  LM: Normal.  LAD:   --  Proximal LAD: There was a 50 % stenosis.  CX:   --  Circumflex: Normal.  RCA:   --  Mid RCA: There was a 40 % stenosis.  --  Distal RCA: There was a 50 % stenosis.  COMPLICATIONS: There were no complications.  DIAGNOSTIC RECOMMENDATIONS: The patient should continue with the present  medications.  Prepared and signed by  Jairon Holguin M.D.  Signed 2017 12:20:13  HEMODYNAMIC TABLES  Pressures:  Baseline/ Room Air  Pressures:  - HR: 78  Pressures:  - Rhythm:  Pressures:  -- Aortic Pressure (S/D/M): --/--/99  Pressures:  -- Left Ventricle (s/edp): 157/39/--  Outputs:  Baseline/ Room Air  Outputs:  -- CALCULATIONS[age]  Outputs:  -- CALCULATIONS: Body Surface Area: 2.05  Outputs:  -- CALCULATIONS: Height in cm: 175.00  Outputs:  -- CALCULATIONS: Sex: Male  Outputs:  -- CALCULATIONS: Weight in k.40 (17 @ 21:55)  ======================================================  PAST MEDICAL & SURGICAL HISTORY:  HTN (hypertension)      CAD (coronary artery disease)  ; stent      Intracranial hemorrhage        Respiratory arrest        Myocardial infarction, unspecified MI type, unspecified artery      History of coronary artery stent placement    ====================ASSESSMENT ==============  58 male with HTN, CAD (s/p PCI ), HFrEF, CVA , and T2DM presenting with chest pressure and unknown tachycardia that was shocked x1, [facility]  found to have in-stent restenosis of pLAD and  of RCA with elevated RA and PA pressures and severely decreased. Admitted to CICU for management of cardiogenic shock and ADHF requiring IABP  -, with hospital course c/b vfib arrest requiring reinsertion of IABP. Not recommended for ATs per HF. Currently listed for transplant status 2.    Plan:  ====================== NEUROLOGY=====================  Anxiety  - No Seroquel/antipsychotics since vfib arrest and prolonged QTC  - Psych Eval, recommended SSRI, but pt. refused   - Psych recommending atarax PRN  - Continue to monitor mental status    PT/Conditioning  - Continue band exercised while on bedrest s/t IABP    ==================== RESPIRATORY======================  Acute Hypoxemic Respiratory Failure  - s/p x2 intubations for cardiogenic pulm edema and the in setting of cardiac arrest, resolved - extubated 11/10  - Currently maintaining >95% sats on room air  - Continue incentive spirometry and monitoring of sp02    Asthma  - c/w albuterol, symbicort and spiriva  - On trelegy at home  - Continue to monitor SpO2 with goal >94%    ====================CARDIOVASCULAR==================  Vfib arrest i/s/o ischemia  - Lido gtt off   - PO Amio load - total of 5g per EP complete , continue PO amio  - Amio held for rising LFTs, continue to hold  - Keep K > 4, Mag > 2.2     Cardiogenic shock requiring IABP (- , -)  - Likely 2/2 NSTEMI and ADHF  -  LHC: pLAD 100 % in-stent restenosis & mRCA, 100 %. PCWP 30. IABP placed.  -  TTE: LV dilated. EF 32 %. Regional WMAs present, mod (grade 2) LV diastolic dysfunction  -  TTE: EF 22% and + LV thrombus  - IABP swapped  to RFA, continue 1:1 support  - Off Milrinone gtt @ 7:30 am   - [place] d/c'd due to elevated K levels  - c/w coreg 25 BID for GDMT  - HIT and HAIDER sent (12/3) as per HF   - UNOS status 2 as of   -  - reduced hydralazine 100 TID to 75 TID and ISD 40 TID to 30 TID for AL reduction    NSTEMI iso stent re-occlusion of pLAD and 100%  of RCA  - EKG on admission w/ LBBB  - DAPT: c/w ASA, Brilinta d/c'd per transplant w/u  - c/w lipitor 80  - cMR deferred given necessity of IABP  - CT sx not recommending CABG, undergoing AT eval    LV thrombus  - c/w heparin gtt    ===================== RENAL =========================  Non-oliguric ARIC, resolved   - Baseline Cr: -  - Renal US: no evidence of renal artery stenosis  - Trend BMP, lytes daily, replace as needed  - Continue Strict I/Os, avoid nephrotoxins    Mild Hyponatremia/Hyperkalemia iso ARIC  - Continue fluid restriction   - Urea powder d/c'd per renal  - Trend daily  - Continue monitoring urine output, lytes, SCr/ BUN  - Replete lytes prn with goal K >4 and Mg >2    =============== GASTROINTESTINAL===================  Constipation/ileus, resolved  - regular diet  - bowel reg prn    ===================ENDO====================  Type 2 DM  - A1c 8.3  - Continue lantus, premeal, low ISS  - continue FS    ===================HEMATOLOGIC/ONC ===================  - H/H & plts stable  - Monitor H/H and plts  - VTE PPX: heparin gtt    ==================INFECTIOUS DISEASE================  # Positive blood culture, resolved  - Repeat BCx drawn  for leukocytosis to 12k - positive on , G+ rods in anaerobic bottle, suspect contaminant  - Repeat cultures x2 sent  per transplant ID recs - no growth to date  - Vancomycin by trough (-)  - Final microbial ID: Cutibacterium acnes, per ID this is likely to be a contaminant, vanc dc'd.   - HF recommending dental consult to rule out potential nidus, patient has no HEENT complaints at this time    # Enterococcus faecalis bacteremia, resolved  - BCx + for enterococcus faecalis x2, Staph epi x1 (likely contaminant)- pan sensitive   - Urine cx  + enterococcus faecalis  - BCx  no growth   - IABP site swapped to RFA   - s/p Vancomycin 1g q12h (-)  - CT A/P negative for infectious pathology    # COVID, resolved  - Off airborne precautions     # Pre-transplant ID w/u   - Trend ID recs for serologies   - Colonoscopy  - normal   - Chest CT  - improved LLL aeration  - s/p immunizations    ==================INFECTIOUS DISEASE================  # Upper Extremity Rash  - Patient developed bilateral upper extremity rash after receiving Hepatitis A & B immunizations on   - Dermatology consulted  - not likely to be related to vanco, can continue per ID recs  - Recommend clobetasol 0.05% BID to affected areas, patient refusing topical therapies at this time  - RVP repeated to rule out viral etiology, negative  Patient requires continuous monitoring with bedside rhythm monitoring, pulse ox monitoring, and intermittent blood gas analysis. Care plan discussed with ICU care team. Patient remained critical and at risk for life threatening decompensation.  Patient seen, examined and plan discussed with CCU team during rounds.     I have personally provided 35 minutes of critical care time excluding time spent on separate procedures, in addition to initial critical care time provided by the CICU Attending, Dr. Lees.    By signing my name below, I, Kalyn Sousa, attest that this documentation has been prepared under the direction and in the presence of Rita Hawthorne NP.  Electronically signed: Rosalba Booth, 23 @ 20:10    I, Rita Hawthorne , personally performed the services described in this documentation. all medical record entries made by the scribe were at my direction and in my presence. I have reviewed the chart and agree that the record reflects my personal performance and is accurate and complete  Electronically signed: Rita Hawthorne NP   DEVORAH VALENCIA  MRN-[id number]  Patient is a 58y old  Male who presents with a chief complaint of Cardiogenic shock (06 Dec 2023 20:30)    HPI: 58M with HFrEF (LVIDd 6.4 cm, LVEF 32%), CAD s/p PCI (), HTN, DMT2 (A1c 8.3%) and CVA s/p TPA (), recently treated for PNA who initially presented to [facility] via EMS after syncope reportedly requiring defibrilation. Treated for ACS but left AMA to come to [facility]. On arrival ECG with ST depression in lateral leads. Intubated  for respiratory failure. Found to have COVID. L/RHC evealing  of LAD and RCA, elevated filling pressures and CI of 1.5 prompting placement of IABP. Extubated 11/3. IABP was weaned to off , then the following day on , developed PMVT cardiac arrest with prompt CPR and defibrillation, started on IV Lidocaine and Amio. IABP ultimately replaced on  for worsening hypotension. TTE  revealing LV thrombus.     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 AT evaluation launched 11/10, he is ABO A. He was discussed during TSC on  and was approved for listing, however was found to be Bacteremic with  Blc Cx growing mixed cultures Staph epi and Enterococcus faecalis and started on IV Vanco. Repeat cultures from  reveal NGTD and therefore was cleared by ID for listing.     He appears adequately supported on IABP at 1:1, electrically quiescent on oral Amiodarone. Cr is improving with holding diuretics. Labs otherwise notable for worsening thrombocytopenia. Awaiting suitable donor. Now with GM + rods in blood culture on  (reported on ), restarted on vancomycin, and status 7, repeat cultures now negative x48 hours (),  now status 2 again.  c/b bilat UE pruritic rash.      Cardiac Studies  23 TTE: limited unable to rule out endocarditis, technically difficult study  1123 TTE: LVEF 22% (global), LV thrombus, normal RV size and function, mild MR, trace TR  23  LHC showed pLAD 70 % stenosis in the ostium portion of the segment and 100 % in-stent restenosis and in mRCA, 100 % stenosis.   23 RHC; RA 23 Vw 24, PA 52/33/40, PCWP 30 Vw 33, AO 85/66/73, PA sat 54.4%, CO/CI (F) 2.96/1.44, IABP was placed during the cath through R common femoral artery.   23 TTE: LVIDd 6.4cm , LVEF 32%, regional WMA, grade II DD,  TAPSE 1.7cm, mld MR, trace TR,     Bedside hemodynamics   IABP 1:1: CVP 3, PA  33-36/12 mean PA 20-23 PCWP 15-16, MVO2 72.0%, CO/CI 6.2/2.8,   dsc? (PVR 0.8-1.1 [place] calculated by me)   11/3: IABP 1:1 RA 5; PA 27/12/18; CO/CI 5.6/2.6; MAP 90-100s.  23 IABP 1:3 CVP 4 PA 37/18 SvO2 83.8 CO/CI 11.2 CI 5.1  (in the setting of fever to 38.3C)   IABP 1:1 CVP 3 PA 48/27 SvO2 78.4% CO 8.2 CI 3.7    (IABP 1:1): CVP 1, PA 33/5/16, MvO2 74.3%    REVIEW OF SYSTEMS:  CONSTITUTIONAL: No weakness, fevers or chills  EYES/ENT: No visual changes;  No vertigo or throat pain   NECK: No pain or stiffness  RESPIRATORY: No cough, wheezing, hemoptysis; No shortness of breath  CARDIOVASCULAR: No chest pain or palpitations  GASTROINTESTINAL: No abdominal or epigastric pain  No nausea, vomiting, or hematemesis; No diarrhea or constipation. No melena or hematochezia.  GENITOURINARY: No dysuria, frequency or hematuria  NEUROLOGICAL: No numbness or weakness  SKIN: No itching, rashes      ICU Vital Signs Last 24 Hrs  T(C): 36.5 (07 Dec 2023 16:00), Max: 36.8 (06 Dec 2023 23:00)  T(F): 97.7 (07 Dec 2023 16:00), Max: 98.3 (06 Dec 2023 23:00)  HR: 79 (07 Dec 2023 20:00) (74 - 87)  RR: 19 (07 Dec 2023 20:00) (15 - 25)  SpO2: 96% (07 Dec 2023 16:00) (96% - 99%)    O2 Parameters below as of 07 Dec 2023 19:00  Patient On (Oxygen Delivery Method): room air        I&O's Summary    06 Dec 2023 07:01  -  07 Dec 2023 07:00  --------------------------------------------------------  IN: 1078 mL / OUT: 2450 mL / NET: -1372 mL    07 Dec 2023 07:01  -  07 Dec 2023 20:10  --------------------------------------------------------  IN: 642 mL / OUT: 500 mL / NET: 142 mL      CAPILLARY BLOOD GLUCOSE  POCT Blood Glucose.: 170 mg/dL (07 Dec 2023 17:02)  ============================I/O===========================   I&O's Detail    06 Dec 2023 07:01  -  07 Dec 2023 07:00  --------------------------------------------------------  IN:    Heparin: 308 mL    Oral Fluid: 770 mL  Total IN: 1078 mL    OUT:    Voided (mL): 2450 mL  Total OUT: 2450 mL    Total NET: -1372 mL      07 Dec 2023 07:01  -  07 Dec 2023 20:10  --------------------------------------------------------  IN:    Heparin: 182 mL    IV PiggyBack: 100 mL    Oral Fluid: 360 mL  Total IN: 642 mL    OUT:    Voided (mL): 500 mL  Total OUT: 500 mL    Total NET: 142 mL  ============================ LABS =========================                      12.0   8.18  )-----------( 140      ( 07 Dec 2023 00:58 )             36.9         135  |  103  |  35<H>  ----------------------------<  149<H>  4.9   |  21<L>  |  1.35<H>    Ca    10.4      07 Dec 2023 00:58  Phos  3.5       Mg     2.0         TPro  7.0  /  Alb  3.9  /  TBili  0.3  /  DBili  x   /  AST  42<H>  /  ALT  117<H>  /  AlkPhos  63      LIVER FUNCTIONS - ( 07 Dec 2023 00:58 )  Alb: 3.9 g/dL / Pro: 7.0 g/dL / ALK PHOS: 63 U/L / ALT: 117 U/L / AST: 42 U/L / GGT: x           PT/INR - ( 07 Dec 2023 00:58 )   PT: 11.4 sec;   INR: 1.09 ratio    PTT - ( 07 Dec 2023 00:58 )  PTT:67.3 sec    Lactate, Blood: 1.7 mmol/L (23 @ 00:58)  Lactate, Blood: 1.2 mmol/L (23 @ 01:49)  Lactate, Blood: 1.3 mmol/L (23 @ 02:41)    Urinalysis Basic - ( 07 Dec 2023 00:58 )    Color: x / Appearance: x / SG: x / pH: x  Gluc: 149 mg/dL / Ketone: x  / Bili: x / Urobili: x   Blood: x / Protein: x / Nitrite: x   Leuk Esterase: x / RBC: x / WBC x   Sq Epi: x / Non Sq Epi: x / Bacteria: x  ======================Micro/Rad/Cardio=================  Telemtry: Reviewed   EKG: Reviewed  CXR: Reviewed  Culture: Reviewed   Echo:   Cath: Cardiac Cath Lab - Adult:   [facility]  Department of Cardiology  [address] [place] [id number]  [phone]  Cath Lab Report -- Comprehensive Report  Patient: LD VALENCIA  Study date: 2017  Account number: [id number]  MR number: [id number]  : 1965  Gender: Male  Race: W  Case Physician(s):  Jairon Hloguin M.D.  Referring Physician:  Luc Lynn M.D.  INDICATIONS: Unstable angina - CCS4.  HISTORY: The patient has a history of coronary artery disease. The patient  hashypertension and medication-treated dyslipidemia.  PROCEDURE:  --  Left heart catheterization with ventriculography.  --  Left coronary angiography.  --  Right coronary angiography.  TECHNIQUE: The risks and alternatives of the procedures and conscious  sedation were explained to the patient and informed consent was obtained.  Cardiac catheterization performed electively.  Local anesthetic given. Right radial artery access. A 6FR PRELUDE KIT was  inserted in the vessel. Left heart catheterization. Ventriculography was  performed. A 5FR FR4.0 EXPO catheter was utilized. Left coronary artery  angiography. The vessel was injected utilizing a 5FR FL3.5 EXPO catheter.  Right coronary artery angiography. The vessel was injected utilizing a 5FR  FR4.0 EXPO catheter. RADIATION EXPOSURE: 1.1 min.  CONTRAST GIVEN: Omnipaque 55 ml.  MEDICATIONS GIVEN: Midazolam, 1 mg, IV. Fentanyl, 25 mcg, IV. Verapamil  (Isoptin, Calan, Covera), 2.5 mg, IA. Heparin, 3000 units, IA.  VENTRICLES: Global left ventricular function was moderately depressed. EF  estimated was 40 %.  CORONARY VESSELS: The coronary circulation is right dominant.  LM:   --  LM: Normal.  LAD:   --  Proximal LAD: There was a 50 % stenosis.  CX:   --  Circumflex: Normal.  RCA:   --  Mid RCA: There was a 40 % stenosis.  --  Distal RCA: There was a 50 % stenosis.  COMPLICATIONS: There were no complications.  DIAGNOSTIC RECOMMENDATIONS: The patient should continue with the present  medications.  Prepared and signed by  Jairon Holguin M.D.  Signed 2017 12:20:13  HEMODYNAMIC TABLES  Pressures:  Baseline/ Room Air  Pressures:  - HR: 78  Pressures:  - Rhythm:  Pressures:  -- Aortic Pressure (S/D/M): --/--/99  Pressures:  -- Left Ventricle (s/edp): 157/39/--  Outputs:  Baseline/ Room Air  Outputs:  -- CALCULATIONS[age]  Outputs:  -- CALCULATIONS: Body Surface Area: 2.05  Outputs:  -- CALCULATIONS: Height in cm: 175.00  Outputs:  -- CALCULATIONS: Sex: Male  Outputs:  -- CALCULATIONS: Weight in k.40 (17 @ 21:55)  ======================================================  PAST MEDICAL & SURGICAL HISTORY:  HTN (hypertension)      CAD (coronary artery disease)  ; stent      Intracranial hemorrhage        Respiratory arrest        Myocardial infarction, unspecified MI type, unspecified artery      History of coronary artery stent placement    ====================ASSESSMENT ==============  58 male with HTN, CAD (s/p PCI ), HFrEF, CVA , and T2DM presenting with chest pressure and unknown tachycardia that was shocked x1, [facility]  found to have in-stent restenosis of pLAD and  of RCA with elevated RA and PA pressures and severely decreased. Admitted to CICU for management of cardiogenic shock and ADHF requiring IABP  -, with hospital course c/b vfib arrest requiring reinsertion of IABP. Not recommended for ATs per HF. Currently listed for transplant status 2.    Plan:  ====================== NEUROLOGY=====================  Anxiety  - No Seroquel/antipsychotics since vfib arrest and prolonged QTC  - Psych Eval, recommended SSRI, but pt. refused   - Psych recommending atarax PRN  - Continue to monitor mental status    PT/Conditioning  - Continue band exercised while on bedrest s/t IABP    ==================== RESPIRATORY======================  Acute Hypoxemic Respiratory Failure  - s/p x2 intubations for cardiogenic pulm edema and the in setting of cardiac arrest, resolved - extubated 11/10  - Currently maintaining >95% sats on room air  - Continue incentive spirometry and monitoring of sp02    Asthma  - c/w albuterol, symbicort and spiriva  - On trelegy at home  - Continue to monitor SpO2 with goal >94%    ====================CARDIOVASCULAR==================  Vfib arrest i/s/o ischemia  - Lido gtt off   - PO Amio load - total of 5g per EP complete , continue PO amio  - Amio held for rising LFTs, continue to hold  - Keep K > 4, Mag > 2.2     Cardiogenic shock requiring IABP (- , -)  - Likely 2/2 NSTEMI and ADHF  -  LHC: pLAD 100 % in-stent restenosis & mRCA, 100 %. PCWP 30. IABP placed.  -  TTE: LV dilated. EF 32 %. Regional WMAs present, mod (grade 2) LV diastolic dysfunction  -  TTE: EF 22% and + LV thrombus  - IABP swapped  to RFA, continue 1:1 support  - Off Milrinone gtt @ 7:30 am   - [place] d/c'd due to elevated K levels  - c/w coreg 25 BID for GDMT  - HIT and HAIDER sent (12/3) as per HF   - UNOS status 2 as of   -  - reduced hydralazine 100 TID to 75 TID and ISD 40 TID to 30 TID for AL reduction    NSTEMI iso stent re-occlusion of pLAD and 100%  of RCA  - EKG on admission w/ LBBB  - DAPT: c/w ASA, Brilinta d/c'd per transplant w/u  - c/w lipitor 80  - cMR deferred given necessity of IABP  - CT sx not recommending CABG, undergoing AT eval    LV thrombus  - c/w heparin gtt    ===================== RENAL =========================  Non-oliguric ARIC, resolved   - Baseline Cr: -  - Renal US: no evidence of renal artery stenosis  - Trend BMP, lytes daily, replace as needed  - Continue Strict I/Os, avoid nephrotoxins    Mild Hyponatremia/Hyperkalemia iso ARIC  - Continue fluid restriction   - Urea powder d/c'd per renal  - Trend daily  - Continue monitoring urine output, lytes, SCr/ BUN  - Replete lytes prn with goal K >4 and Mg >2    =============== GASTROINTESTINAL===================  Constipation/ileus, resolved  - regular diet  - bowel reg prn    ===================ENDO====================  Type 2 DM  - A1c 8.3  - Continue lantus, premeal, low ISS  - continue FS    ===================HEMATOLOGIC/ONC ===================  - H/H & plts stable  - Monitor H/H and plts  - VTE PPX: heparin gtt    ==================INFECTIOUS DISEASE================  # Positive blood culture, resolved  - Repeat BCx drawn  for leukocytosis to 12k - positive on , G+ rods in anaerobic bottle, suspect contaminant  - Repeat cultures x2 sent  per transplant ID recs - no growth to date  - Vancomycin by trough (-)  - Final microbial ID: Cutibacterium acnes, per ID this is likely to be a contaminant, vanc dc'd.   - HF recommending dental consult to rule out potential nidus, patient has no HEENT complaints at this time    # Enterococcus faecalis bacteremia, resolved  - BCx + for enterococcus faecalis x2, Staph epi x1 (likely contaminant)- pan sensitive   - Urine cx  + enterococcus faecalis  - BCx  no growth   - IABP site swapped to RFA   - s/p Vancomycin 1g q12h (-)  - CT A/P negative for infectious pathology    # COVID, resolved  - Off airborne precautions     # Pre-transplant ID w/u   - Trend ID recs for serologies   - Colonoscopy  - normal   - Chest CT  - improved LLL aeration  - s/p immunizations    ==================INFECTIOUS DISEASE================  # Upper Extremity Rash  - Patient developed bilateral upper extremity rash after receiving Hepatitis A & B immunizations on   - Dermatology consulted  - not likely to be related to vanco, can continue per ID recs  - Recommend clobetasol 0.05% BID to affected areas, patient refusing topical therapies at this time  - RVP repeated to rule out viral etiology, negative  Patient requires continuous monitoring with bedside rhythm monitoring, pulse ox monitoring, and intermittent blood gas analysis. Care plan discussed with ICU care team. Patient remained critical and at risk for life threatening decompensation.  Patient seen, examined and plan discussed with CCU team during rounds.     I have personally provided 35 minutes of critical care time excluding time spent on separate procedures, in addition to initial critical care time provided by the CICU Attending, Dr. Lees.    By signing my name below, I, Kalyn Sousa, attest that this documentation has been prepared under the direction and in the presence of Rita Hawthorne NP.  Electronically signed: Rosalba Booth, 23 @ 20:10    I, Rita Hawthorne , personally performed the services described in this documentation. all medical record entries made by the scribe were at my direction and in my presence. I have reviewed the chart and agree that the record reflects my personal performance and is accurate and complete  Electronically signed: Rita Hawthorne NP

## 2023-12-07 NOTE — PROGRESS NOTE ADULT - ASSESSMENT
59 yo M with HFrEF (LVIDd 6.4 cm, LVEF 32%), CAD s/p PCI (2008), HTN, DMT2 (A1c 8.3%) and CVA s/p TPA (2018), recently treated for PNA who initially presented to [facility] via EMS after syncope reportedly requiring defibrilation. Treated for ACS but left AMA to come to [facility]. On arrival ECG with ST depression in lateral leads. Intubated 11/1 for respiratory failure. Found to have COVID. L/RHC 11/1revealing  of LAD and RCA, elevated filling pressures and CI of 1.5 prompting placement of IABP. Extubated 11/3. IABP was weaned to off 11/7, then the following day on 11/8, developed PMVT cardiac arrest with prompt CPR and defibrillation, started on IV Lidocaine and Amio. IABP ultimately replaced on 11/9 for worsening hypotension. TTE 11/11 revealing LV thrombus.     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 AT evaluation launched 11/10, he is ABO A. He was discussed during TSC on 11/16 and was approved for listing, however was found to be Bacteremic with 11/17 Blc Cx growing mixed cultures Staph epi and Enterococcus faecalis and started on IV Vanco. Repeat cultures from 11/18 reveal NGTD and therefore was cleared by ID for listing.     He appears adequately supported on IABP at 1:1, electrically quiescent on oral Amiodarone. Cr is improving with holding diuretics. Labs otherwise notable for worsening thrombocytopenia. Awaiting suitable donor. Now with GM + rods in blood culture on 11/30 (reported on 12/4), restarted on vancomycin, and status 7, repeat cultures now negative x48 hours (12/6),  now status 2 again.  c/b bilat UE pruritic rash.        Cardiac Studies  11/21/23 TTE: limited unable to rule out endocarditis, technically difficult study  11/1123 TTE: LVEF 22% (global), LV thrombus, normal RV size and function, mild MR, trace TR  11/1/23  LHC showed pLAD 70 % stenosis in the ostium portion of the segment and 100 % in-stent restenosis and in mRCA, 100 % stenosis.   11/1/23 RHC; RA 23 Vw 24, PA 52/33/40, PCWP 30 Vw 33, AO 85/66/73, PA sat 54.4%, CO/CI (F) 2.96/1.44, IABP was placed during the cath through R common femoral artery.   11/1/23 TTE: LVIDd 6.4cm , LVEF 32%, regional WMA, grade II DD,  TAPSE 1.7cm, mld MR, trace TR,     Bedside hemodynamics  11/25 IABP 1:1: CVP 3, PA  33-36/12 mean PA 20-23 PCWP 15-16, MVO2 72.0%, CO/CI 6.2/2.8,   dsc? (PVR 0.8-1.1 medina calculated by me)   11/3: IABP 1:1 RA 5; PA 27/12/18; CO/CI 5.6/2.6; MAP 90-100s.  11/4/23 IABP 1:3 CVP 4 PA 37/18 SvO2 83.8 CO/CI 11.2 CI 5.1  (in the setting of fever to 38.3C)  11/5 IABP 1:1 CVP 3 PA 48/27 SvO2 78.4% CO 8.2 CI 3.7   11/1 (IABP 1:1): CVP 1, PA 33/5/16, MvO2 74.3% 57 yo M with HFrEF (LVIDd 6.4 cm, LVEF 32%), CAD s/p PCI (2008), HTN, DMT2 (A1c 8.3%) and CVA s/p TPA (2018), recently treated for PNA who initially presented to [facility] via EMS after syncope reportedly requiring defibrilation. Treated for ACS but left AMA to come to [facility]. On arrival ECG with ST depression in lateral leads. Intubated 11/1 for respiratory failure. Found to have COVID. L/RHC 11/1revealing  of LAD and RCA, elevated filling pressures and CI of 1.5 prompting placement of IABP. Extubated 11/3. IABP was weaned to off 11/7, then the following day on 11/8, developed PMVT cardiac arrest with prompt CPR and defibrillation, started on IV Lidocaine and Amio. IABP ultimately replaced on 11/9 for worsening hypotension. TTE 11/11 revealing LV thrombus.     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 AT evaluation launched 11/10, he is ABO A. He was discussed during TSC on 11/16 and was approved for listing, however was found to be Bacteremic with 11/17 Blc Cx growing mixed cultures Staph epi and Enterococcus faecalis and started on IV Vanco. Repeat cultures from 11/18 reveal NGTD and therefore was cleared by ID for listing.     He appears adequately supported on IABP at 1:1, electrically quiescent on oral Amiodarone. Cr is improving with holding diuretics. Labs otherwise notable for worsening thrombocytopenia. Awaiting suitable donor. Now with GM + rods in blood culture on 11/30 (reported on 12/4), restarted on vancomycin, and status 7, repeat cultures now negative x48 hours (12/6),  now status 2 again.  c/b bilat UE pruritic rash.        Cardiac Studies  11/21/23 TTE: limited unable to rule out endocarditis, technically difficult study  11/1123 TTE: LVEF 22% (global), LV thrombus, normal RV size and function, mild MR, trace TR  11/1/23  LHC showed pLAD 70 % stenosis in the ostium portion of the segment and 100 % in-stent restenosis and in mRCA, 100 % stenosis.   11/1/23 RHC; RA 23 Vw 24, PA 52/33/40, PCWP 30 Vw 33, AO 85/66/73, PA sat 54.4%, CO/CI (F) 2.96/1.44, IABP was placed during the cath through R common femoral artery.   11/1/23 TTE: LVIDd 6.4cm , LVEF 32%, regional WMA, grade II DD,  TAPSE 1.7cm, mld MR, trace TR,     Bedside hemodynamics  11/25 IABP 1:1: CVP 3, PA  33-36/12 mean PA 20-23 PCWP 15-16, MVO2 72.0%, CO/CI 6.2/2.8,   dsc? (PVR 0.8-1.1 medina calculated by me)   11/3: IABP 1:1 RA 5; PA 27/12/18; CO/CI 5.6/2.6; MAP 90-100s.  11/4/23 IABP 1:3 CVP 4 PA 37/18 SvO2 83.8 CO/CI 11.2 CI 5.1  (in the setting of fever to 38.3C)  11/5 IABP 1:1 CVP 3 PA 48/27 SvO2 78.4% CO 8.2 CI 3.7   11/1 (IABP 1:1): CVP 1, PA 33/5/16, MvO2 74.3%

## 2023-12-07 NOTE — PROGRESS NOTE ADULT - ASSESSMENT
59 yo M with PMHx of HTN, CAD w/ 1 stent in 2009, ICH (2008) presented on 11/1 with abn ekg. Patient presented to [facility] where he was found to have STEMI, recommended to get cath however patient did not want to get it there so he left and came here.   EKG here with ST depression in lateral leads and elevation in anterior leads   Prior to [facility] found to be tachycardic, dyspneic, intubated   C 11/1 with chronic total occlusion of LAD and RCA, with elevated RA and PA pressures  TTE 11/1 with severely decreased EF 32%, s/p IABP 11/1    RVP (11/1) Positive for COVID19  RVP (11/10) Negative  BCx (11/2, 11/4) NGTD  ETT Culture (11/2) NGTD  MRSA/MSSA PCR (11/2, 11/10) Negative  UA (11/10) 1 WBC    CXR (11/10) Clear Lungs  TTE (11/2) Left ventricular thrombus.    #Positive Blood Cultures (11/30 - Cutibacterium)  Growth this far from procurement raises question of contaminant  Given ID as Cutibacterium skin procurement contaminant is favored  Repeat blood cultures with NGTD  --Hold off on further Vancomycin  --No absolute ID contraindication to re-activate for transplant.   --Continue to follow CBC with diff  --Continue to follow temperature curve    #Rash  ?Secondary to Cefepime on 11/2?  ?Secondary to hepatitis vaccine?  ?Secondary to Vancomycin  --Appreciate Dermatology input  --Follow LFT's and CBC with Diff (For Eosinophil Counts)    #Enterococcus Bacteremia, Leukocytosis, Pre-Heart Transplant Evaluation Serologies  Concern for either central line or urinary source  CT A/P Noncontrast (11/18) No acute process  s/p ten-days of IV Vancomycin last dose received on 11/27    #Encounter to Vaccinate Patient  COVID19: COVID19 Infection This Admission. Would consider Vaccination in ~3 months  Influenza: declined  Pneumococcal: Would benefit from PCV20  HAV: received first dose 11/24  HBV: received first dose Heplisav 11/24  MMR: Not Mumps Immune, if >1 month until transplant would consider MMR dose  Varicella: Immune  Shingles: recommend Shingrix series  Tdap: received Tdap booster this admission    I will continue to follow. Please feel free to contact me with any further questions.    Silviano Manuel M.D.  [facility] Division of Infectious Disease  8AM-5PM Monday - Friday: Available on [employer] Teams  After Hours and Holidays (or if no response on [employer] Teams): Please contact the Infectious Diseases Office at [phone] 59 yo M with PMHx of HTN, CAD w/ 1 stent in 2009, ICH (2008) presented on 11/1 with abn ekg. Patient presented to [facility] where he was found to have STEMI, recommended to get cath however patient did not want to get it there so he left and came here.   EKG here with ST depression in lateral leads and elevation in anterior leads   Prior to [facility] found to be tachycardic, dyspneic, intubated   C 11/1 with chronic total occlusion of LAD and RCA, with elevated RA and PA pressures  TTE 11/1 with severely decreased EF 32%, s/p IABP 11/1    RVP (11/1) Positive for COVID19  RVP (11/10) Negative  BCx (11/2, 11/4) NGTD  ETT Culture (11/2) NGTD  MRSA/MSSA PCR (11/2, 11/10) Negative  UA (11/10) 1 WBC    CXR (11/10) Clear Lungs  TTE (11/2) Left ventricular thrombus.    #Positive Blood Cultures (11/30 - Cutibacterium)  Growth this far from procurement raises question of contaminant  Given ID as Cutibacterium skin procurement contaminant is favored  Repeat blood cultures with NGTD  --Hold off on further Vancomycin  --No absolute ID contraindication to re-activate for transplant.   --Continue to follow CBC with diff  --Continue to follow temperature curve    #Rash  ?Secondary to Cefepime on 11/2?  ?Secondary to hepatitis vaccine?  ?Secondary to Vancomycin  --Appreciate Dermatology input  --Follow LFT's and CBC with Diff (For Eosinophil Counts)    #Enterococcus Bacteremia, Leukocytosis, Pre-Heart Transplant Evaluation Serologies  Concern for either central line or urinary source  CT A/P Noncontrast (11/18) No acute process  s/p ten-days of IV Vancomycin last dose received on 11/27    #Encounter to Vaccinate Patient  COVID19: COVID19 Infection This Admission. Would consider Vaccination in ~3 months  Influenza: declined  Pneumococcal: Would benefit from PCV20  HAV: received first dose 11/24  HBV: received first dose Heplisav 11/24  MMR: Not Mumps Immune, if >1 month until transplant would consider MMR dose  Varicella: Immune  Shingles: recommend Shingrix series  Tdap: received Tdap booster this admission    I will continue to follow. Please feel free to contact me with any further questions.    Silviano Manuel M.D.  [facility] Division of Infectious Disease  8AM-5PM Monday - Friday: Available on [employer] Teams  After Hours and Holidays (or if no response on Microsoft Teams): Please contact the Infectious Diseases Office at [phone]

## 2023-12-07 NOTE — PROGRESS NOTE ADULT - PROBLEM SELECTOR PLAN 1
- L IABP at 1:1, placed 11/9. Exchange to LFA given high grade bacteremia on 11/20. On AC with Heparin infusion (also for LV thrombus)  - Continue Coreg 25 mg BID hold for MAP <65  - Continue HDZN 100 mg TID and ISDN 30 mg TID, hold for MAP <65  - Off romel given HyperK  -Albumin x1 today  - AT evaluation: Accepted for transplant, currently listed UNOS Status 2. ABO A. PRA 0% 11/13. Last Brilinta dose was on 11/13, However, now status 7 on 12/4 given + blood culture.   -GM + rods in blood culture on 11/30 (reported on 12/4), now status 7, pending repeat cultures  -on vancomycin, appreciate Transplant ID recs.   - Please keep primary Dr. Banuelos [phone] in the loop per family request. - L IABP at 1:1, placed 11/9. Exchange to LFA given high grade bacteremia on 11/20. On AC with Heparin infusion (also for LV thrombus)  - Continue Coreg 25 mg BID hold for MAP <65  - Continue HDZN 100 mg TID and ISDN 30 mg TID, hold for MAP <65  - Off romel given HyperK  -Albumin x1 today  - AT evaluation: Accepted for transplant, currently listed UNOS Status 2. ABO A. PRA 0% 11/13. Last Brilinta dose was on 11/13, However, now status 7 on 12/4 given + blood culture.   -GM + rods in blood culture on 11/30 (reported on 12/4), now status 7, pending repeat cultures  -on vancomycin, appreciate Transplant ID recs.   - Please keep primary Dr. Banuelos [phone] in the loop per family request.

## 2023-12-07 NOTE — PROGRESS NOTE ADULT - ASSESSMENT
59 yo male h/o htn, cad s/p pci, ICH, here with NSTEMI  s/p intubation and cath. now in CCU    NSTEMI  s/p  cath  cath results noted. multi vessel dz., CTSx f/u.   hep gtt  pt with vtach/fib arrest again on 11/8. s/p ACLS and ROSC.   now extubated  IABP in place  mngt as per CCU  plan for transplant as per HF and transplant team  transplant w/u ongoing.   s/p colonoscopy 11/17. normal  now listed for transplant as of 11/22    LV thrombus   hep gtt    acute on chronic systolic heart failure  heart failure team f/u    pna  resolved     covid  resolved  now off isolation     h/o ICH  resolved    agitation  psy consult f/u    bacteremia  id following  iv abx  f/u repeat cult      vomiting and abd pain  ileus on CT  bowel regimen  gi f/u  +bm    now resolved.     IABP malposition on CT  mngt as per ccu. iabp adjusted    rash  possible drug reaction  derm f/u    mngt as per CCU team          Advanced care planning was discussed with patient and family.  Advanced care planning forms were reviewed and discussed as appropriate.  Differential diagnosis and plan of care discussed with patient after the evaluation.   Pain assessed and judicious use of narcotics when appropriate was discussed.  Importance of Fall prevention discussed.  Counseling on Smoking and Alcohol cessation was offered when appropriate.  Counseling on Diet, exercise, and medication compliance was done.       Approx 75 minutes spent.

## 2023-12-07 NOTE — PROGRESS NOTE ADULT - SUBJECTIVE AND OBJECTIVE BOX
Behavioral Cardiology Progress Note     History of Present Illness: Mr. Hardy is a 58 year-old man with history of HTN, CAD (1 stent in 2009), ICH (2008) presented on 11/1 with abnormal EKG. Patient presented to [facility] where he was found to have STEMI, recommended to get cath however patient did not want treatment at OSH so left and came to [facility]. EKG here with ST depression in lateral leads and elevation in anterior leads. Prior to C found to be tachycardic, dyspneic, intubated. LHC 11/1 with chronic total occlusion of LAD and RCA, with elevated RA and PA pressures. TTE 11/1 with severely decreased EF 32%, s/p IABP 11/1.     Social history: Mr. Martin Hardy is a 58-year-old [place] American,  male with three children, including two sons and three daughters. 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 His wife, Brynn, works as an  in a public high school in [place] [facility]. Mr. Hardy reported three close friendships with men who live in [place]. Notably, one of these men, Dr. Brody Le, is Mr. Hardy’s healthcare proxy and PCP. Mr. Hardy also reported that these friends feel like family due to their closeness. Currently, Mr. Hardy lives in a condominium apartment in [place] [facility] with his wife and children. 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 Mr. Hardy noted his parents live in [place] as does one brother and one sister. Mr. Hardy has another sister, diagnosed with bipolar disorder, who lives in [place]. He described feeling distant from his siblings, as they lack a close relationship.      Substance use:    •	Tobacco: Denies current and past tobacco use.    •	Alcohol: Denies current and past alcohol use.   •	Drug: Denies current and past drug use.      Past psychiatric history: Mr. Hardy denied any history of self-harm and suicidal ideation, plan, or attempt. He also denied any history of violent thoughts or behaviors. Denies history of outpatient treatment with a therapist or psychiatrist. With this said, he reported a history of odd thinking related, health-related anxiety. For example, Mr. Hardy reported meeting with a physician about 12 years ago because he feared having a brain tumor. He believed that the presence of a brain tumor could explain his superior memory. Earlier this year, he revealed anxiety to his PCP, who prescribed Mr. Hardy Lorazepam (0.5mg PRN). Per medical chart, Mr. Hardy filled his prescription three separate times in the past year. He reported concern about dependency, given Lorazepam’s status as a controlled medication, and desire to avoid substance abuse.      Psychological assessment: Mood "good." Continues to work remotely during hospitalization. Feels he is coping well with unknown wait time for transplant and has a large support network of family and friends that remain in contact. Continues to focus on his family and future wellbeing. Slept well last night. Appetite good. Denies current symptoms of anxiety. Denies depressive symptoms. Receptive to supportive therapy and CBT strategies.    Mental Status Exam: Seen lying in bed on IABP on CICU. Awake, alert. Oriented x4. Well related. Maintained good eye contact. Speech was normal volume, rate, tone. Mood euthymic. Affect full range. Thought process goal directed, content focused on current health status. Denies s/i. Insight into disease good. Immediate judgement good.        Dx: Adjustment Disorder with anxiety    No absolute psychological contraindications for pursuing heart transplant/LVAD with following recommendations:     ·	Psychology will continue to follow for supportive therapy and CBT strategies to manage anxiety.  ·	Seen by psychiatry (11/8), will benefit from follow up.   ·	Maintain ongoing psychiatric follow-up.   ·	Holistic Nurse.   ·	Pet therapy if appropriate.   ·	LVAD and heart transplant support group information provided.       30 minutes spent on total patient encounter     Behavioral Cardiology Progress Note     History of Present Illness: Mr. Hardy is a 58 year-old man with history of HTN, CAD (1 stent in 2009), ICH (2008) presented on 11/1 with abnormal EKG. Patient presented to [facility] where he was found to have STEMI, recommended to get cath however patient did not want treatment at OSH so left and came to [facility]. EKG here with ST depression in lateral leads and elevation in anterior leads. Prior to C found to be tachycardic, dyspneic, intubated. LHC 11/1 with chronic total occlusion of LAD and RCA, with elevated RA and PA pressures. TTE 11/1 with severely decreased EF 32%, s/p IABP 11/1.     Social history: Mr. Martin Hardy is a 58-year-old [place]ian American,  male with three children, including two sons and three daughters. 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 His wife, Brynn, works as an  in a public high school in [employer] [facility]. Mr. Hardy reported three close friendships with men who live in [place]. Notably, one of these men, Dr. Brody Le, is Mr. Hardy’s healthcare proxy and PCP. Mr. Hardy also reported that these friends feel like family due to their closeness. Currently, Mr. Hardy lives in a condominium apartment in [facility] with his wife and children. 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 Mr. Hardy noted his parents live in [place] as does one brother and one sister. Mr. Hardy has another sister, diagnosed with bipolar disorder, who lives in [place]. He described feeling distant from his siblings, as they lack a close relationship.      Substance use:    •	Tobacco: Denies current and past tobacco use.    •	Alcohol: Denies current and past alcohol use.   •	Drug: Denies current and past drug use.      Past psychiatric history: Mr. Hardy denied any history of self-harm and suicidal ideation, plan, or attempt. He also denied any history of violent thoughts or behaviors. Denies history of outpatient treatment with a therapist or psychiatrist. With this said, he reported a history of odd thinking related, health-related anxiety. For example, Mr. Hardy reported meeting with a physician about 12 years ago because he feared having a brain tumor. He believed that the presence of a brain tumor could explain his superior memory. Earlier this year, he revealed anxiety to his PCP, who prescribed Mr. Hardy Lorazepam (0.5mg PRN). Per medical chart, Mr. Hardy filled his prescription three separate times in the past year. He reported concern about dependency, given Lorazepam’s status as a controlled medication, and desire to avoid substance abuse.      Psychological assessment: Mood "good." Continues to work remotely during hospitalization. Feels he is coping well with unknown wait time for transplant and has a large support network of family and friends that remain in contact. Continues to focus on his family and future wellbeing. Slept well last night. Appetite good. Denies current symptoms of anxiety. Denies depressive symptoms. Receptive to supportive therapy and CBT strategies.    Mental Status Exam: Seen lying in bed on IABP on CICU. Awake, alert. Oriented x4. Well related. Maintained good eye contact. Speech was normal volume, rate, tone. Mood euthymic. Affect full range. Thought process goal directed, content focused on current health status. Denies s/i. Insight into disease good. Immediate judgement good.        Dx: Adjustment Disorder with anxiety    No absolute psychological contraindications for pursuing heart transplant/LVAD with following recommendations:     ·	Psychology will continue to follow for supportive therapy and CBT strategies to manage anxiety.  ·	Seen by psychiatry (11/8), will benefit from follow up.   ·	Maintain ongoing psychiatric follow-up.   ·	Holistic Nurse.   ·	Pet therapy if appropriate.   ·	LVAD and heart transplant support group information provided.       30 minutes spent on total patient encounter

## 2023-12-08 LAB
-  AMOXICILLIN/CLAVULANIC ACID: SIGNIFICANT CHANGE UP
-  AMOXICILLIN/CLAVULANIC ACID: SIGNIFICANT CHANGE UP
-  AMPICILLIN/SULBACTAM: SIGNIFICANT CHANGE UP
-  AMPICILLIN/SULBACTAM: SIGNIFICANT CHANGE UP
-  AMPICILLIN: SIGNIFICANT CHANGE UP
-  AMPICILLIN: SIGNIFICANT CHANGE UP
-  CLINDAMYCIN: SIGNIFICANT CHANGE UP
-  CLINDAMYCIN: SIGNIFICANT CHANGE UP
-  IMIPENEM: SIGNIFICANT CHANGE UP
-  IMIPENEM: SIGNIFICANT CHANGE UP
-  METRONIDAZOLE: SIGNIFICANT CHANGE UP
-  METRONIDAZOLE: SIGNIFICANT CHANGE UP
-  PENICILLIN: SIGNIFICANT CHANGE UP
-  PENICILLIN: SIGNIFICANT CHANGE UP
ALBUMIN SERPL ELPH-MCNC: 4 G/DL — SIGNIFICANT CHANGE UP (ref 3.3–5)
ALBUMIN SERPL ELPH-MCNC: 4 G/DL — SIGNIFICANT CHANGE UP (ref 3.3–5)
ALP SERPL-CCNC: 60 U/L — SIGNIFICANT CHANGE UP (ref 40–120)
ALP SERPL-CCNC: 60 U/L — SIGNIFICANT CHANGE UP (ref 40–120)
ALT FLD-CCNC: 110 U/L — HIGH (ref 10–45)
ALT FLD-CCNC: 110 U/L — HIGH (ref 10–45)
ANION GAP SERPL CALC-SCNC: 10 MMOL/L — SIGNIFICANT CHANGE UP (ref 5–17)
ANION GAP SERPL CALC-SCNC: 10 MMOL/L — SIGNIFICANT CHANGE UP (ref 5–17)
APTT BLD: 65.7 SEC — HIGH (ref 24.5–35.6)
APTT BLD: 65.7 SEC — HIGH (ref 24.5–35.6)
AST SERPL-CCNC: 40 U/L — SIGNIFICANT CHANGE UP (ref 10–40)
AST SERPL-CCNC: 40 U/L — SIGNIFICANT CHANGE UP (ref 10–40)
BASOPHILS # BLD AUTO: 0.02 K/UL — SIGNIFICANT CHANGE UP (ref 0–0.2)
BASOPHILS # BLD AUTO: 0.02 K/UL — SIGNIFICANT CHANGE UP (ref 0–0.2)
BASOPHILS NFR BLD AUTO: 0.3 % — SIGNIFICANT CHANGE UP (ref 0–2)
BASOPHILS NFR BLD AUTO: 0.3 % — SIGNIFICANT CHANGE UP (ref 0–2)
BILIRUB SERPL-MCNC: 0.2 MG/DL — SIGNIFICANT CHANGE UP (ref 0.2–1.2)
BILIRUB SERPL-MCNC: 0.2 MG/DL — SIGNIFICANT CHANGE UP (ref 0.2–1.2)
BUN SERPL-MCNC: 36 MG/DL — HIGH (ref 7–23)
BUN SERPL-MCNC: 36 MG/DL — HIGH (ref 7–23)
CALCIUM SERPL-MCNC: 9.9 MG/DL — SIGNIFICANT CHANGE UP (ref 8.4–10.5)
CALCIUM SERPL-MCNC: 9.9 MG/DL — SIGNIFICANT CHANGE UP (ref 8.4–10.5)
CHLORIDE SERPL-SCNC: 103 MMOL/L — SIGNIFICANT CHANGE UP (ref 96–108)
CHLORIDE SERPL-SCNC: 103 MMOL/L — SIGNIFICANT CHANGE UP (ref 96–108)
CO2 SERPL-SCNC: 21 MMOL/L — LOW (ref 22–31)
CO2 SERPL-SCNC: 21 MMOL/L — LOW (ref 22–31)
CREAT SERPL-MCNC: 1.21 MG/DL — SIGNIFICANT CHANGE UP (ref 0.5–1.3)
CREAT SERPL-MCNC: 1.21 MG/DL — SIGNIFICANT CHANGE UP (ref 0.5–1.3)
CULTURE RESULTS: ABNORMAL
CULTURE RESULTS: ABNORMAL
EGFR: 69 ML/MIN/1.73M2 — SIGNIFICANT CHANGE UP
EGFR: 69 ML/MIN/1.73M2 — SIGNIFICANT CHANGE UP
EOSINOPHIL # BLD AUTO: 0.5 K/UL — SIGNIFICANT CHANGE UP (ref 0–0.5)
EOSINOPHIL # BLD AUTO: 0.5 K/UL — SIGNIFICANT CHANGE UP (ref 0–0.5)
EOSINOPHIL NFR BLD AUTO: 6.6 % — HIGH (ref 0–6)
EOSINOPHIL NFR BLD AUTO: 6.6 % — HIGH (ref 0–6)
GLUCOSE BLDC GLUCOMTR-MCNC: 102 MG/DL — HIGH (ref 70–99)
GLUCOSE BLDC GLUCOMTR-MCNC: 102 MG/DL — HIGH (ref 70–99)
GLUCOSE BLDC GLUCOMTR-MCNC: 139 MG/DL — HIGH (ref 70–99)
GLUCOSE BLDC GLUCOMTR-MCNC: 139 MG/DL — HIGH (ref 70–99)
GLUCOSE BLDC GLUCOMTR-MCNC: 188 MG/DL — HIGH (ref 70–99)
GLUCOSE BLDC GLUCOMTR-MCNC: 188 MG/DL — HIGH (ref 70–99)
GLUCOSE BLDC GLUCOMTR-MCNC: 191 MG/DL — HIGH (ref 70–99)
GLUCOSE BLDC GLUCOMTR-MCNC: 191 MG/DL — HIGH (ref 70–99)
GLUCOSE SERPL-MCNC: 215 MG/DL — HIGH (ref 70–99)
GLUCOSE SERPL-MCNC: 215 MG/DL — HIGH (ref 70–99)
HCT VFR BLD CALC: 34.9 % — LOW (ref 39–50)
HCT VFR BLD CALC: 34.9 % — LOW (ref 39–50)
HGB BLD-MCNC: 11.6 G/DL — LOW (ref 13–17)
HGB BLD-MCNC: 11.6 G/DL — LOW (ref 13–17)
IMM GRANULOCYTES NFR BLD AUTO: 0.4 % — SIGNIFICANT CHANGE UP (ref 0–0.9)
IMM GRANULOCYTES NFR BLD AUTO: 0.4 % — SIGNIFICANT CHANGE UP (ref 0–0.9)
INR BLD: 1.03 RATIO — SIGNIFICANT CHANGE UP (ref 0.85–1.18)
INR BLD: 1.03 RATIO — SIGNIFICANT CHANGE UP (ref 0.85–1.18)
LACTATE SERPL-SCNC: 1.5 MMOL/L — SIGNIFICANT CHANGE UP (ref 0.5–2)
LACTATE SERPL-SCNC: 1.5 MMOL/L — SIGNIFICANT CHANGE UP (ref 0.5–2)
LYMPHOCYTES # BLD AUTO: 1.32 K/UL — SIGNIFICANT CHANGE UP (ref 1–3.3)
LYMPHOCYTES # BLD AUTO: 1.32 K/UL — SIGNIFICANT CHANGE UP (ref 1–3.3)
LYMPHOCYTES # BLD AUTO: 17.4 % — SIGNIFICANT CHANGE UP (ref 13–44)
LYMPHOCYTES # BLD AUTO: 17.4 % — SIGNIFICANT CHANGE UP (ref 13–44)
MAGNESIUM SERPL-MCNC: 1.7 MG/DL — SIGNIFICANT CHANGE UP (ref 1.6–2.6)
MAGNESIUM SERPL-MCNC: 1.7 MG/DL — SIGNIFICANT CHANGE UP (ref 1.6–2.6)
MCHC RBC-ENTMCNC: 30.1 PG — SIGNIFICANT CHANGE UP (ref 27–34)
MCHC RBC-ENTMCNC: 30.1 PG — SIGNIFICANT CHANGE UP (ref 27–34)
MCHC RBC-ENTMCNC: 33.2 GM/DL — SIGNIFICANT CHANGE UP (ref 32–36)
MCHC RBC-ENTMCNC: 33.2 GM/DL — SIGNIFICANT CHANGE UP (ref 32–36)
MCV RBC AUTO: 90.6 FL — SIGNIFICANT CHANGE UP (ref 80–100)
MCV RBC AUTO: 90.6 FL — SIGNIFICANT CHANGE UP (ref 80–100)
METHOD TYPE: SIGNIFICANT CHANGE UP
METHOD TYPE: SIGNIFICANT CHANGE UP
MONOCYTES # BLD AUTO: 0.54 K/UL — SIGNIFICANT CHANGE UP (ref 0–0.9)
MONOCYTES # BLD AUTO: 0.54 K/UL — SIGNIFICANT CHANGE UP (ref 0–0.9)
MONOCYTES NFR BLD AUTO: 7.1 % — SIGNIFICANT CHANGE UP (ref 2–14)
MONOCYTES NFR BLD AUTO: 7.1 % — SIGNIFICANT CHANGE UP (ref 2–14)
NEUTROPHILS # BLD AUTO: 5.16 K/UL — SIGNIFICANT CHANGE UP (ref 1.8–7.4)
NEUTROPHILS # BLD AUTO: 5.16 K/UL — SIGNIFICANT CHANGE UP (ref 1.8–7.4)
NEUTROPHILS NFR BLD AUTO: 68.2 % — SIGNIFICANT CHANGE UP (ref 43–77)
NEUTROPHILS NFR BLD AUTO: 68.2 % — SIGNIFICANT CHANGE UP (ref 43–77)
NRBC # BLD: 0 /100 WBCS — SIGNIFICANT CHANGE UP (ref 0–0)
NRBC # BLD: 0 /100 WBCS — SIGNIFICANT CHANGE UP (ref 0–0)
ORGANISM # SPEC MICROSCOPIC CNT: ABNORMAL
PHOSPHATE SERPL-MCNC: 3.2 MG/DL — SIGNIFICANT CHANGE UP (ref 2.5–4.5)
PHOSPHATE SERPL-MCNC: 3.2 MG/DL — SIGNIFICANT CHANGE UP (ref 2.5–4.5)
PLATELET # BLD AUTO: 126 K/UL — LOW (ref 150–400)
PLATELET # BLD AUTO: 126 K/UL — LOW (ref 150–400)
POTASSIUM SERPL-MCNC: 4.3 MMOL/L — SIGNIFICANT CHANGE UP (ref 3.5–5.3)
POTASSIUM SERPL-MCNC: 4.3 MMOL/L — SIGNIFICANT CHANGE UP (ref 3.5–5.3)
POTASSIUM SERPL-SCNC: 4.3 MMOL/L — SIGNIFICANT CHANGE UP (ref 3.5–5.3)
POTASSIUM SERPL-SCNC: 4.3 MMOL/L — SIGNIFICANT CHANGE UP (ref 3.5–5.3)
PROT SERPL-MCNC: 6.8 G/DL — SIGNIFICANT CHANGE UP (ref 6–8.3)
PROT SERPL-MCNC: 6.8 G/DL — SIGNIFICANT CHANGE UP (ref 6–8.3)
PROTHROM AB SERPL-ACNC: 11.3 SEC — SIGNIFICANT CHANGE UP (ref 9.5–13)
PROTHROM AB SERPL-ACNC: 11.3 SEC — SIGNIFICANT CHANGE UP (ref 9.5–13)
RBC # BLD: 3.85 M/UL — LOW (ref 4.2–5.8)
RBC # BLD: 3.85 M/UL — LOW (ref 4.2–5.8)
RBC # FLD: 15.5 % — HIGH (ref 10.3–14.5)
RBC # FLD: 15.5 % — HIGH (ref 10.3–14.5)
SODIUM SERPL-SCNC: 134 MMOL/L — LOW (ref 135–145)
SODIUM SERPL-SCNC: 134 MMOL/L — LOW (ref 135–145)
SPECIMEN SOURCE: SIGNIFICANT CHANGE UP
SPECIMEN SOURCE: SIGNIFICANT CHANGE UP
UFH PPP CHRO-ACNC: 0.4 IU/ML — SIGNIFICANT CHANGE UP (ref 0.3–0.7)
UFH PPP CHRO-ACNC: 0.4 IU/ML — SIGNIFICANT CHANGE UP (ref 0.3–0.7)
VANCOMYCIN FLD-MCNC: <4 UG/ML — SIGNIFICANT CHANGE UP
VANCOMYCIN FLD-MCNC: <4 UG/ML — SIGNIFICANT CHANGE UP
WBC # BLD: 7.57 K/UL — SIGNIFICANT CHANGE UP (ref 3.8–10.5)
WBC # BLD: 7.57 K/UL — SIGNIFICANT CHANGE UP (ref 3.8–10.5)
WBC # FLD AUTO: 7.57 K/UL — SIGNIFICANT CHANGE UP (ref 3.8–10.5)
WBC # FLD AUTO: 7.57 K/UL — SIGNIFICANT CHANGE UP (ref 3.8–10.5)

## 2023-12-08 PROCEDURE — 71045 X-RAY EXAM CHEST 1 VIEW: CPT | Mod: 26

## 2023-12-08 PROCEDURE — 99291 CRITICAL CARE FIRST HOUR: CPT

## 2023-12-08 PROCEDURE — 99292 CRITICAL CARE ADDL 30 MIN: CPT

## 2023-12-08 PROCEDURE — 99232 SBSQ HOSP IP/OBS MODERATE 35: CPT

## 2023-12-08 PROCEDURE — 99291 CRITICAL CARE FIRST HOUR: CPT | Mod: GC,25

## 2023-12-08 PROCEDURE — 93010 ELECTROCARDIOGRAM REPORT: CPT

## 2023-12-08 RX ORDER — MAGNESIUM SULFATE 500 MG/ML
2 VIAL (ML) INJECTION ONCE
Refills: 0 | Status: COMPLETED | OUTPATIENT
Start: 2023-12-08 | End: 2023-12-08

## 2023-12-08 RX ADMIN — Medication 0: at 21:09

## 2023-12-08 RX ADMIN — Medication 81 MILLIGRAM(S): at 11:13

## 2023-12-08 RX ADMIN — ATORVASTATIN CALCIUM 80 MILLIGRAM(S): 80 TABLET, FILM COATED ORAL at 21:10

## 2023-12-08 RX ADMIN — BUDESONIDE AND FORMOTEROL FUMARATE DIHYDRATE 2 PUFF(S): 160; 4.5 AEROSOL RESPIRATORY (INHALATION) at 20:45

## 2023-12-08 RX ADMIN — Medication 1 APPLICATION(S): at 05:08

## 2023-12-08 RX ADMIN — Medication 1 APPLICATION(S): at 19:04

## 2023-12-08 RX ADMIN — ISOSORBIDE DINITRATE 30 MILLIGRAM(S): 5 TABLET ORAL at 11:11

## 2023-12-08 RX ADMIN — CHLORHEXIDINE GLUCONATE 1 APPLICATION(S): 213 SOLUTION TOPICAL at 21:22

## 2023-12-08 RX ADMIN — INSULIN GLARGINE 12 UNIT(S): 100 INJECTION, SOLUTION SUBCUTANEOUS at 21:09

## 2023-12-08 RX ADMIN — Medication 9 UNIT(S): at 13:01

## 2023-12-08 RX ADMIN — Medication 0.5 MILLIGRAM(S): at 09:37

## 2023-12-08 RX ADMIN — Medication 1: at 12:59

## 2023-12-08 RX ADMIN — Medication 75 MILLIGRAM(S): at 21:10

## 2023-12-08 RX ADMIN — CARVEDILOL PHOSPHATE 25 MILLIGRAM(S): 80 CAPSULE, EXTENDED RELEASE ORAL at 05:08

## 2023-12-08 RX ADMIN — HEPARIN SODIUM 14 UNIT(S)/HR: 5000 INJECTION INTRAVENOUS; SUBCUTANEOUS at 19:43

## 2023-12-08 RX ADMIN — Medication 75 MILLIGRAM(S): at 14:19

## 2023-12-08 RX ADMIN — CARVEDILOL PHOSPHATE 25 MILLIGRAM(S): 80 CAPSULE, EXTENDED RELEASE ORAL at 18:49

## 2023-12-08 RX ADMIN — Medication 9 UNIT(S): at 09:08

## 2023-12-08 RX ADMIN — BUDESONIDE AND FORMOTEROL FUMARATE DIHYDRATE 2 PUFF(S): 160; 4.5 AEROSOL RESPIRATORY (INHALATION) at 09:30

## 2023-12-08 RX ADMIN — Medication 9 UNIT(S): at 19:13

## 2023-12-08 RX ADMIN — Medication 75 MILLIGRAM(S): at 05:08

## 2023-12-08 RX ADMIN — ISOSORBIDE DINITRATE 30 MILLIGRAM(S): 5 TABLET ORAL at 17:57

## 2023-12-08 RX ADMIN — HEPARIN SODIUM 14 UNIT(S)/HR: 5000 INJECTION INTRAVENOUS; SUBCUTANEOUS at 05:07

## 2023-12-08 RX ADMIN — Medication 25 GRAM(S): at 05:07

## 2023-12-08 RX ADMIN — ISOSORBIDE DINITRATE 30 MILLIGRAM(S): 5 TABLET ORAL at 06:33

## 2023-12-08 NOTE — PROGRESS NOTE ADULT - SUBJECTIVE AND OBJECTIVE BOX
LD VALENCIA  58y Male  MRN:[id number]    Patient is a 58y old  Male who presents with a chief complaint of NSTEMI (01 Nov 2023 20:29)    HPI:  57yo M w/ hx HTN, CAD w/ 1 stent in 2009, ICH (2008) presenting with abn ekg. Patient presented to [facility] where he was found to have STEMI, recommended to get cath however patient did not want to get it there so it left and came here.  Patient initially had cough, congestion, fever, was placed on antibiotics on Sunday.  Started feeling nauseous and had a presyncopal event after which he presented to ED last night.  Had chest pain as well.  Chest pain is midsternal.  Not currently having chest pain.  Received 4 aspirin 30 min pta. (01 Nov 2023 15:11)      Patient seen and evaluated at bedside in CCU. interval events noted    Interval HPI: remain in ccu. IABP in place        PAST MEDICAL & SURGICAL HISTORY:  HTN (hypertension)      CAD (coronary artery disease)  2009; stent      Intracranial hemorrhage  2008      Respiratory arrest  december 1st      Myocardial infarction, unspecified MI type, unspecified artery      History of coronary artery stent placement          REVIEW OF SYSTEMS:  as per hpi     VITALS:   ICU Vital Signs Last 24 Hrs  T(C): 36.7 (08 Dec 2023 11:00), Max: 37.1 (08 Dec 2023 03:00)  T(F): 98 (08 Dec 2023 11:00), Max: 98.7 (08 Dec 2023 03:00)  HR: 79 (08 Dec 2023 13:00) (78 - 87)  BP: --  BP(mean): --  ABP: --  ABP(mean): --  RR: 20 (08 Dec 2023 13:00) (14 - 24)  SpO2: 96% (08 Dec 2023 12:00) (95% - 97%)    O2 Parameters below as of 08 Dec 2023 12:00  Patient On (Oxygen Delivery Method): room air            PHYSICAL EXAM:  GENERAL: NAD, comfortable   HEAD:  Atraumatic, Normocephalic  EYES: EOMI, PERRLA, conjunctiva and sclera clear  NECK: Supple, No JVD   CHEST/LUNG: Clear to auscultation bilaterally; No wheeze  HEART: Regular rate and rhythm; No murmurs, rubs, or gallops  ABDOMEN: Soft, Nontender, Nondistended; Bowel sounds present  EXTREMITIES:  2+ Peripheral Pulses, No clubbing, cyanosis, or edema  NEUROLOGY: nonfocal  SKIN: +rash  +iabp    Consultant(s) Notes Reviewed:  [x ] YES  [ ] NO  Care Discussed with Consultants/Other Providers [ x] YES  [ ] NO    MEDS:   MEDICATIONS  (STANDING):  aspirin  chewable 81 milliGRAM(s) Oral daily  atorvastatin 80 milliGRAM(s) Oral at bedtime  budesonide  80 MICROgram(s)/formoterol 4.5 MICROgram(s) Inhaler 2 Puff(s) Inhalation two times a day  carvedilol 25 milliGRAM(s) Oral every 12 hours  chlorhexidine 2% Cloths 1 Application(s) Topical daily  clobetasol 0.05% Ointment 1 Application(s) Topical two times a day  heparin  Infusion 1300 Unit(s)/Hr (14 mL/Hr) IV Continuous <Continuous>  hydrALAZINE 75 milliGRAM(s) Oral three times a day  insulin glargine Injectable (LANTUS) 12 Unit(s) SubCutaneous at bedtime  insulin lispro (ADMELOG) corrective regimen sliding scale   SubCutaneous three times a day before meals  insulin lispro (ADMELOG) corrective regimen sliding scale   SubCutaneous at bedtime  insulin lispro Injectable (ADMELOG) 9 Unit(s) SubCutaneous three times a day before meals  isosorbide   dinitrate Tablet (ISORDIL) 30 milliGRAM(s) Oral three times a day  polyethylene glycol 3350 17 Gram(s) Oral two times a day  senna 2 Tablet(s) Oral at bedtime    MEDICATIONS  (PRN):  hydrOXYzine hydrochloride 25 milliGRAM(s) Oral two times a day PRN Anxiety      ALLERGIES:  penicillins (Unknown)      LABS:                                               11.6   7.57  )-----------( 126      ( 08 Dec 2023 01:22 )             34.9   12-08    134<L>  |  103  |  36<H>  ----------------------------<  215<H>  4.3   |  21<L>  |  1.21    Ca    9.9      08 Dec 2023 01:23  Phos  3.2     12-08  Mg     1.7     12-08    TPro  6.8  /  Alb  4.0  /  TBili  0.2  /  DBili  x   /  AST  40  /  ALT  110<H>  /  AlkPhos  60  12-08      < from: CT Abdomen and Pelvis No Cont (11.28.23 @ 03:38) >  IMPRESSION:  Mildly dilated colon and prominent but not overly dilated small bowel   with air-fluid levels. No discrete transition point. Findings are   suggestive of ileus.    Intra-aortic balloon pump with the inferior marker at the level of the   inferior mesenteric artery and the balloon overlying the origins of the   renal arteries, celiac artery, and superior mesenteric artery. Consider   repositioning. Concern for IABP positioning was discussed with LANETTE Hawthorne   on 11/28/2023 at 3:42 AM by Dr. Shepard.    < end of copied text >          < from: Colonoscopy (11.17.23 @ 10:35) >                                                                                                        Impression:          - The entire examined colon is normal on direct and retroflexion views.                       - No specimens collected.  Recommendation:      - Resume previous diet today.                       - No large polyps or masses detected, No objection from GI standpoint to                 proceed with heart transplantation/advanced heart therapies.                       - Please call back should any further questions or concerns arise.    < end of copied text >     < from: Xray Chest 1 View- PORTABLE-Urgent (11.01.23 @ 07:42) >    IMPRESSION:  Clear lungs.    ---End of Report ---        < end of copied text >  < from: TTE W or WO Ultrasound Enhancing Agent (11.01.23 @ 10:23) >  _____________________________     CONCLUSIONS:      1. Left ventricular cavity is moderately dilated. Left ventricular wall thickness is normal. Left ventricular systolic function is severely decreased with an ejection fraction of 32 % by Chinchilla's method of disks. Regional wall motion abnormalities present.   2. Multiple segmental abnormalities exist. See findings.   3. There is moderate (grade 2) left ventricular diastolic dysfunction, with indeterminant filling pressure.   4. Normal right ventricular cavity size, wall thickness, and systolic function.   5. No significant valvular disease.   6. No pericardial effusion seen.   7. Compared to the transthoracic echocardiogram performed on 1/25/2017 the areas of akinesis are unchanged but there has been a decline in LV systolic function with new areas of hypokinesis.    __________________________________________________________________    < end of copied text >  < from: TTE Limited W or WO Ultrasound Enhancing Agent (11.02.23 @ 07:41) >  __________________________     CONCLUSIONS:      1. After obtaining consent, Definity ultrasound enhancing agent was given for enhanced left ventricular opacification and improved delineation of the left ventricular endocardial borders. Left ventricular systolic function is severely decreased with a calculated ejection fraction of 22 % by the Chinchilla's biplane method of disks. There is a left ventricular thrombus.   2. Findings were discussed with Litzy BOSS on 11/2/2023 at 8.49am.   3. There is a left ventricular thrombus.    _________________________________________________________________    < end of copied text >   LD VALENCIA  58y Male  MRN:[id number]    Patient is a 58y old  Male who presents with a chief complaint of NSTEMI (01 Nov 2023 20:29)    HPI:  59yo M w/ hx HTN, CAD w/ 1 stent in 2009, ICH (2008) presenting with abn ekg. Patient presented to [facility] where he was found to have STEMI, recommended to get cath however patient did not want to get it there so it left and came here.  Patient initially had cough, congestion, fever, was placed on antibiotics on Sunday.  Started feeling nauseous and had a presyncopal event after which he presented to ED last night.  Had chest pain as well.  Chest pain is midsternal.  Not currently having chest pain.  Received 4 aspirin 30 min pta. (01 Nov 2023 15:11)      Patient seen and evaluated at bedside in CCU. interval events noted    Interval HPI: remain in ccu. IABP in place        PAST MEDICAL & SURGICAL HISTORY:  HTN (hypertension)      CAD (coronary artery disease)  2009; stent      Intracranial hemorrhage  2008      Respiratory arrest  december 1st      Myocardial infarction, unspecified MI type, unspecified artery      History of coronary artery stent placement          REVIEW OF SYSTEMS:  as per hpi     VITALS:   ICU Vital Signs Last 24 Hrs  T(C): 36.7 (08 Dec 2023 11:00), Max: 37.1 (08 Dec 2023 03:00)  T(F): 98 (08 Dec 2023 11:00), Max: 98.7 (08 Dec 2023 03:00)  HR: 79 (08 Dec 2023 13:00) (78 - 87)  BP: --  BP(mean): --  ABP: --  ABP(mean): --  RR: 20 (08 Dec 2023 13:00) (14 - 24)  SpO2: 96% (08 Dec 2023 12:00) (95% - 97%)    O2 Parameters below as of 08 Dec 2023 12:00  Patient On (Oxygen Delivery Method): room air            PHYSICAL EXAM:  GENERAL: NAD, comfortable   HEAD:  Atraumatic, Normocephalic  EYES: EOMI, PERRLA, conjunctiva and sclera clear  NECK: Supple, No JVD   CHEST/LUNG: Clear to auscultation bilaterally; No wheeze  HEART: Regular rate and rhythm; No murmurs, rubs, or gallops  ABDOMEN: Soft, Nontender, Nondistended; Bowel sounds present  EXTREMITIES:  2+ Peripheral Pulses, No clubbing, cyanosis, or edema  NEUROLOGY: nonfocal  SKIN: +rash  +iabp    Consultant(s) Notes Reviewed:  [x ] YES  [ ] NO  Care Discussed with Consultants/Other Providers [ x] YES  [ ] NO    MEDS:   MEDICATIONS  (STANDING):  aspirin  chewable 81 milliGRAM(s) Oral daily  atorvastatin 80 milliGRAM(s) Oral at bedtime  budesonide  80 MICROgram(s)/formoterol 4.5 MICROgram(s) Inhaler 2 Puff(s) Inhalation two times a day  carvedilol 25 milliGRAM(s) Oral every 12 hours  chlorhexidine 2% Cloths 1 Application(s) Topical daily  clobetasol 0.05% Ointment 1 Application(s) Topical two times a day  heparin  Infusion 1300 Unit(s)/Hr (14 mL/Hr) IV Continuous <Continuous>  hydrALAZINE 75 milliGRAM(s) Oral three times a day  insulin glargine Injectable (LANTUS) 12 Unit(s) SubCutaneous at bedtime  insulin lispro (ADMELOG) corrective regimen sliding scale   SubCutaneous three times a day before meals  insulin lispro (ADMELOG) corrective regimen sliding scale   SubCutaneous at bedtime  insulin lispro Injectable (ADMELOG) 9 Unit(s) SubCutaneous three times a day before meals  isosorbide   dinitrate Tablet (ISORDIL) 30 milliGRAM(s) Oral three times a day  polyethylene glycol 3350 17 Gram(s) Oral two times a day  senna 2 Tablet(s) Oral at bedtime    MEDICATIONS  (PRN):  hydrOXYzine hydrochloride 25 milliGRAM(s) Oral two times a day PRN Anxiety      ALLERGIES:  penicillins (Unknown)      LABS:                                               11.6   7.57  )-----------( 126      ( 08 Dec 2023 01:22 )             34.9   12-08    134<L>  |  103  |  36<H>  ----------------------------<  215<H>  4.3   |  21<L>  |  1.21    Ca    9.9      08 Dec 2023 01:23  Phos  3.2     12-08  Mg     1.7     12-08    TPro  6.8  /  Alb  4.0  /  TBili  0.2  /  DBili  x   /  AST  40  /  ALT  110<H>  /  AlkPhos  60  12-08      < from: CT Abdomen and Pelvis No Cont (11.28.23 @ 03:38) >  IMPRESSION:  Mildly dilated colon and prominent but not overly dilated small bowel   with air-fluid levels. No discrete transition point. Findings are   suggestive of ileus.    Intra-aortic balloon pump with the inferior marker at the level of the   inferior mesenteric artery and the balloon overlying the origins of the   renal arteries, celiac artery, and superior mesenteric artery. Consider   repositioning. Concern for IABP positioning was discussed with LANETTE Hawthorne   on 11/28/2023 at 3:42 AM by Dr. Shepard.    < end of copied text >          < from: Colonoscopy (11.17.23 @ 10:35) >                                                                                                        Impression:          - The entire examined colon is normal on direct and retroflexion views.                       - No specimens collected.  Recommendation:      - Resume previous diet today.                       - No large polyps or masses detected, No objection from GI standpoint to                 proceed with heart transplantation/advanced heart therapies.                       - Please call back should any further questions or concerns arise.    < end of copied text >     < from: Xray Chest 1 View- PORTABLE-Urgent (11.01.23 @ 07:42) >    IMPRESSION:  Clear lungs.    ---End of Report ---        < end of copied text >  < from: TTE W or WO Ultrasound Enhancing Agent (11.01.23 @ 10:23) >  _____________________________     CONCLUSIONS:      1. Left ventricular cavity is moderately dilated. Left ventricular wall thickness is normal. Left ventricular systolic function is severely decreased with an ejection fraction of 32 % by Chinchilla's method of disks. Regional wall motion abnormalities present.   2. Multiple segmental abnormalities exist. See findings.   3. There is moderate (grade 2) left ventricular diastolic dysfunction, with indeterminant filling pressure.   4. Normal right ventricular cavity size, wall thickness, and systolic function.   5. No significant valvular disease.   6. No pericardial effusion seen.   7. Compared to the transthoracic echocardiogram performed on 1/25/2017 the areas of akinesis are unchanged but there has been a decline in LV systolic function with new areas of hypokinesis.    __________________________________________________________________    < end of copied text >  < from: TTE Limited W or WO Ultrasound Enhancing Agent (11.02.23 @ 07:41) >  __________________________     CONCLUSIONS:      1. After obtaining consent, Definity ultrasound enhancing agent was given for enhanced left ventricular opacification and improved delineation of the left ventricular endocardial borders. Left ventricular systolic function is severely decreased with a calculated ejection fraction of 22 % by the Chinchilla's biplane method of disks. There is a left ventricular thrombus.   2. Findings were discussed with Litzy BOSS on 11/2/2023 at 8.49am.   3. There is a left ventricular thrombus.    _________________________________________________________________    < end of copied text >

## 2023-12-08 NOTE — PROGRESS NOTE ADULT - SUBJECTIVE AND OBJECTIVE BOX
Follow Up:  pre-OHT    Interval History: afebrile. no acute events.     REVIEW OF SYSTEMS  [  ] ROS unobtainable because:    [x  ] All other systems negative except as noted below    Constitutional:  [ ] fever [ ] chills  [ ] weight loss  [ ] weakness  Skin:  [ x] rash [ ] phlebitis	  Eyes: [ ] icterus [ ] pain  [ ] discharge	  ENMT: [ ] sore throat  [ ] thrush [ ] ulcers [ ] exudates  Respiratory: [ ] dyspnea [ ] hemoptysis [ ] cough [ ] sputum	  Cardiovascular:  [ ] chest pain [ ] palpitations [ ] edema	  Gastrointestinal:  [ ] nausea [ ] vomiting [ ] diarrhea [ ] constipation [ ] pain	  Genitourinary:  [ ] dysuria [ ] frequency [ ] hematuria [ ] discharge [ ] flank pain  [ ] incontinence  Musculoskeletal:  [ ] myalgias [ ] arthralgias [ ] arthritis  [ ] back pain  Neurological:  [ ] headache [ ] seizures  [ ] confusion/altered mental status    Allergies  penicillins (Unknown)        ANTIMICROBIALS:      OTHER MEDS:  MEDICATIONS  (STANDING):  aspirin  chewable 81 daily  atorvastatin 80 at bedtime  budesonide  80 MICROgram(s)/formoterol 4.5 MICROgram(s) Inhaler 2 two times a day  carvedilol 25 every 12 hours  heparin  Infusion 1300 <Continuous>  hydrALAZINE 75 three times a day  hydrOXYzine hydrochloride 25 two times a day PRN  insulin glargine Injectable (LANTUS) 12 at bedtime  insulin lispro (ADMELOG) corrective regimen sliding scale  at bedtime  insulin lispro (ADMELOG) corrective regimen sliding scale  three times a day before meals  insulin lispro Injectable (ADMELOG) 9 three times a day before meals  isosorbide   dinitrate Tablet (ISORDIL) 30 three times a day  polyethylene glycol 3350 17 two times a day  senna 2 at bedtime      Vital Signs Last 24 Hrs  T(C): 36.7 (08 Dec 2023 11:00), Max: 37.1 (08 Dec 2023 03:00)  T(F): 98 (08 Dec 2023 11:00), Max: 98.7 (08 Dec 2023 03:00)  HR: 79 (08 Dec 2023 13:00) (78 - 87)  BP: --  BP(mean): --  RR: 20 (08 Dec 2023 13:00) (14 - 24)  SpO2: 96% (08 Dec 2023 12:00) (95% - 97%)    Parameters below as of 08 Dec 2023 12:00  Patient On (Oxygen Delivery Method): room air    PHYSICAL EXAMINATION:  General: Alert and Awake, NAD  Cardiac: RRR, No M/R/G  Resp: CTAB, No Wh/Rh/Ra  Abdomen: NBS, NT/ND, No HSM, No rigidity or guarding  MSK: No LE edema. No Calf tenderness  Vasc: R Balloon Pump (No surrounding erythema, drainage or tenderness to palpation)  Skin: Maculopapular rash on UE, UE and to lesser degree the chest. Skin is warm and dry to the touch.   Neuro: Alert and Awake. CN 2-12 Grossly intact. Moves all four extremities spontaneously.  Psych: Calm, Pleasant, Cooperative                          11.6   7.57  )-----------( 126      ( 08 Dec 2023 01:22 )             34.9       12-08    134<L>  |  103  |  36<H>  ----------------------------<  215<H>  4.3   |  21<L>  |  1.21    Ca    9.9      08 Dec 2023 01:23  Phos  3.2     12-08  Mg     1.7     12-08    TPro  6.8  /  Alb  4.0  /  TBili  0.2  /  DBili  x   /  AST  40  /  ALT  110<H>  /  AlkPhos  60  12-08      Urinalysis Basic - ( 08 Dec 2023 01:23 )    Color: x / Appearance: x / SG: x / pH: x  Gluc: 215 mg/dL / Ketone: x  / Bili: x / Urobili: x   Blood: x / Protein: x / Nitrite: x   Leuk Esterase: x / RBC: x / WBC x   Sq Epi: x / Non Sq Epi: x / Bacteria: x    MICROBIOLOGY:    Vancomycin Level, Random: <4.0 ug/mL (12-08-23 @ 01:22)    .Blood Blood-Peripheral  12-04-23   No growth at 72 Hours  --  --      .Blood Blood-Peripheral  12-04-23   No growth at 72 Hours  --  --      .Blood Blood  11-30-23   Growth in anaerobic bottle: Cutibacterium acnes  Direct identification is available within approximately 3-5  hours either by Blood Panel Multiplexed PCR or Direct  MALDI-TOF. Details: [email or web address]  --  Cutibacterium acnes  Blood Culture PCR      .Blood Blood-Venous  11-29-23   No growth at 5 days  --  --      .Blood Blood-Peripheral  11-18-23   No growth at 5 days  --  --      .Blood Blood-Peripheral  11-18-23   No growth at 5 days  --  --      Clean Catch Clean Catch (Midstream)  11-18-23   10,000 - 49,000 CFU/mL Enterococcus faecalis  <10,000 CFU/ml Normal Urogenital kaitlynn present  --  Enterococcus faecalis      .Blood Blood  11-17-23   Growth in aerobic and anaerobic bottles: Enterococcus faecalis  See previous culture [id number]  Growth in aerobic bottle: Staphylococcus epidermidis  --  Staphylococcus epidermidis      .Blood Blood  11-17-23   Growth in aerobic bottle: Enterococcus faecalis  Growth in anaerobic bottle: Staphylococcus epidermidis "Susceptibilities  not performed"  Direct identification is available within approximately 3-5  hours either by Blood Panel Multiplexed PCR or Direct  MALDI-TOF. Details: [email or web address]  --  Blood Culture PCR  Blood Culture PCR  Enterococcus faecalis      .Blood Blood-Peripheral  11-10-23   No growth at 5 days  --  --        CMV IgG Antibody: 4.20 U/mL (11-10-23 @ 17:29)  Toxoplasma IgG Screen: <3.0 IU/mL (11-10-23 @ 17:29)  HIV-1 RNA Quantitative, Viral Load Log: NOT DET. lg /mL (11-10-23 @ 17:06)    Rapid RVP Result: NotDetec (12-05 @ 20:42)        RADIOLOGY:    <The imaging below has been reviewed and visualized by me independently. Findings as detailed in report below>    < from: Xray Chest 1 View- PORTABLE-Routine (Xray Chest 1 View- PORTABLE-Routine in AM.) (12.07.23 @ 03:11) >  IMPRESSION:  Visualized portions of the lungs are clear.  Lines and tubes as described.    < end of copied text >   Follow Up:  pre-OHT    Interval History: afebrile. no acute events.     REVIEW OF SYSTEMS  [  ] ROS unobtainable because:    [x  ] All other systems negative except as noted below    Constitutional:  [ ] fever [ ] chills  [ ] weight loss  [ ] weakness  Skin:  [ x] rash [ ] phlebitis	  Eyes: [ ] icterus [ ] pain  [ ] discharge	  ENMT: [ ] sore throat  [ ] thrush [ ] ulcers [ ] exudates  Respiratory: [ ] dyspnea [ ] hemoptysis [ ] cough [ ] sputum	  Cardiovascular:  [ ] chest pain [ ] palpitations [ ] edema	  Gastrointestinal:  [ ] nausea [ ] vomiting [ ] diarrhea [ ] constipation [ ] pain	  Genitourinary:  [ ] dysuria [ ] frequency [ ] hematuria [ ] discharge [ ] flank pain  [ ] incontinence  Musculoskeletal:  [ ] myalgias [ ] arthralgias [ ] arthritis  [ ] back pain  Neurological:  [ ] headache [ ] seizures  [ ] confusion/altered mental status    Allergies  penicillins (Unknown)        ANTIMICROBIALS:      OTHER MEDS:  MEDICATIONS  (STANDING):  aspirin  chewable 81 daily  atorvastatin 80 at bedtime  budesonide  80 MICROgram(s)/formoterol 4.5 MICROgram(s) Inhaler 2 two times a day  carvedilol 25 every 12 hours  heparin  Infusion 1300 <Continuous>  hydrALAZINE 75 three times a day  hydrOXYzine hydrochloride 25 two times a day PRN  insulin glargine Injectable (LANTUS) 12 at bedtime  insulin lispro (ADMELOG) corrective regimen sliding scale  at bedtime  insulin lispro (ADMELOG) corrective regimen sliding scale  three times a day before meals  insulin lispro Injectable (ADMELOG) 9 three times a day before meals  isosorbide   dinitrate Tablet (ISORDIL) 30 three times a day  polyethylene glycol 3350 17 two times a day  senna 2 at bedtime      Vital Signs Last 24 Hrs  T(C): 36.7 (08 Dec 2023 11:00), Max: 37.1 (08 Dec 2023 03:00)  T(F): 98 (08 Dec 2023 11:00), Max: 98.7 (08 Dec 2023 03:00)  HR: 79 (08 Dec 2023 13:00) (78 - 87)  BP: --  BP(mean): --  RR: 20 (08 Dec 2023 13:00) (14 - 24)  SpO2: 96% (08 Dec 2023 12:00) (95% - 97%)    Parameters below as of 08 Dec 2023 12:00  Patient On (Oxygen Delivery Method): room air    PHYSICAL EXAMINATION:  General: Alert and Awake, NAD  Cardiac: RRR, No M/R/G  Resp: CTAB, No Wh/Rh/Ra  Abdomen: NBS, NT/ND, No HSM, No rigidity or guarding  MSK: No LE edema. No Calf tenderness  Vasc: R Balloon Pump (No surrounding erythema, drainage or tenderness to palpation)  Skin: Maculopapular rash on UE, UE and to lesser degree the chest. Skin is warm and dry to the touch.   Neuro: Alert and Awake. CN 2-12 Grossly intact. Moves all four extremities spontaneously.  Psych: Calm, Pleasant, Cooperative                          11.6   7.57  )-----------( 126      ( 08 Dec 2023 01:22 )             34.9       12-08    134<L>  |  103  |  36<H>  ----------------------------<  215<H>  4.3   |  21<L>  |  1.21    Ca    9.9      08 Dec 2023 01:23  Phos  3.2     12-08  Mg     1.7     12-08    TPro  6.8  /  Alb  4.0  /  TBili  0.2  /  DBili  x   /  AST  40  /  ALT  110<H>  /  AlkPhos  60  12-08      Urinalysis Basic - ( 08 Dec 2023 01:23 )    Color: x / Appearance: x / SG: x / pH: x  Gluc: 215 mg/dL / Ketone: x  / Bili: x / Urobili: x   Blood: x / Protein: x / Nitrite: x   Leuk Esterase: x / RBC: x / WBC x   Sq Epi: x / Non Sq Epi: x / Bacteria: x    MICROBIOLOGY:    Vancomycin Level, Random: <4.0 ug/mL (12-08-23 @ 01:22)    .Blood Blood-Peripheral  12-04-23   No growth at 72 Hours  --  --      .Blood Blood-Peripheral  12-04-23   No growth at 72 Hours  --  --      .Blood Blood  11-30-23   Growth in anaerobic bottle: Cutibacterium acnes  Direct identification is available within approximately 3-5  hours either by Blood Panel Multiplexed PCR or Direct  MALDI-TOF. Details: [email or web address]  --  Cutibacterium acnes  Blood Culture PCR      .Blood Blood-Venous  11-29-23   No growth at 5 days  --  --      .Blood Blood-Peripheral  11-18-23   No growth at 5 days  --  --      .Blood Blood-Peripheral  11-18-23   No growth at 5 days  --  --      Clean Catch Clean Catch (Midstream)  11-18-23   10,000 - 49,000 CFU/mL Enterococcus faecalis  <10,000 CFU/ml Normal Urogenital kaitlynn present  --  Enterococcus faecalis      .Blood Blood  11-17-23   Growth in aerobic and anaerobic bottles: Enterococcus faecalis  See previous culture [id number]  Growth in aerobic bottle: Staphylococcus epidermidis  --  Staphylococcus epidermidis      .Blood Blood  11-17-23   Growth in aerobic bottle: Enterococcus faecalis  Growth in anaerobic bottle: Staphylococcus epidermidis "Susceptibilities  not performed"  Direct identification is available within approximately 3-5  hours either by Blood Panel Multiplexed PCR or Direct  MALDI-TOF. Details: [email or web address]  --  Blood Culture PCR  Blood Culture PCR  Enterococcus faecalis      .Blood Blood-Peripheral  11-10-23   No growth at 5 days  --  --        CMV IgG Antibody: 4.20 U/mL (11-10-23 @ 17:29)  Toxoplasma IgG Screen: <3.0 IU/mL (11-10-23 @ 17:29)  HIV-1 RNA Quantitative, Viral Load Log: NOT DET. lg /mL (11-10-23 @ 17:06)    Rapid RVP Result: NotDetec (12-05 @ 20:42)        RADIOLOGY:    <The imaging below has been reviewed and visualized by me independently. Findings as detailed in report below>    < from: Xray Chest 1 View- PORTABLE-Routine (Xray Chest 1 View- PORTABLE-Routine in AM.) (12.07.23 @ 03:11) >  IMPRESSION:  Visualized portions of the lungs are clear.  Lines and tubes as described.    < end of copied text >

## 2023-12-08 NOTE — PROGRESS NOTE ADULT - ASSESSMENT
57 yo M with HFrEF (LVIDd 6.4 cm, LVEF 32%), CAD s/p PCI (2008), HTN, DMT2 (A1c 8.3%) and CVA s/p TPA (2018), recently treated for PNA who initially presented to [facility] via EMS after syncope reportedly requiring defibrilation. Treated for ACS but left AMA to come to [facility]. On arrival ECG with ST depression in lateral leads. Intubated 11/1 for respiratory failure. Found to have COVID. L/RHC 11/1revealing  of LAD and RCA, elevated filling pressures and CI of 1.5 prompting placement of IABP. Extubated 11/3. IABP was weaned to off 11/7, then the following day on 11/8, developed PMVT cardiac arrest with prompt CPR and defibrillation, started on IV Lidocaine and Amio. IABP ultimately replaced on 11/9 for worsening hypotension. TTE 11/11 revealing LV thrombus.     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 AT evaluation launched 11/10, he is ABO A. He was discussed during TSC on 11/16 and was approved for listing, however was found to be Bacteremic with 11/17 Blc Cx growing mixed cultures Staph epi and Enterococcus faecalis and started on IV Vanco. Repeat cultures from 11/18 reveal NGTD and therefore was cleared by ID for listing.     He appears adequately supported on IABP at 1:1, electrically quiescent on oral Amiodarone. Cr is improving with holding diuretics. Labs otherwise notable for worsening thrombocytopenia. Awaiting suitable donor. Now with GM + rods (Cutibacterium) in blood culture on 11/30 (reported on 12/4), restarted on vancomycin, and status 7, repeat cultures now negative x48 hours (12/6),  now status 2 again.  c/b bilat UE pruritic rash.        Cardiac Studies  11/21/23 TTE: limited unable to rule out endocarditis, technically difficult study  11/1123 TTE: LVEF 22% (global), LV thrombus, normal RV size and function, mild MR, trace TR  11/1/23  LHC showed pLAD 70 % stenosis in the ostium portion of the segment and 100 % in-stent restenosis and in mRCA, 100 % stenosis.   11/1/23 RHC; RA 23 Vw 24, PA 52/33/40, PCWP 30 Vw 33, AO 85/66/73, PA sat 54.4%, CO/CI (F) 2.96/1.44, IABP was placed during the cath through R common femoral artery.   11/1/23 TTE: LVIDd 6.4cm , LVEF 32%, regional WMA, grade II DD,  TAPSE 1.7cm, mld MR, trace TR,     Bedside hemodynamics  11/25 IABP 1:1: CVP 3, PA  33-36/12 mean PA 20-23 PCWP 15-16, MVO2 72.0%, CO/CI 6.2/2.8,   dsc? (PVR 0.8-1.1 [facility] calculated by me)   11/3: IABP 1:1 RA 5; PA 27/12/18; CO/CI 5.6/2.6; MAP 90-100s.  11/4/23 IABP 1:3 CVP 4 PA 37/18 SvO2 83.8 CO/CI 11.2 CI 5.1  (in the setting of fever to 38.3C)  11/5 IABP 1:1 CVP 3 PA 48/27 SvO2 78.4% CO 8.2 CI 3.7   11/1 (IABP 1:1): CVP 1, PA 33/5/16, MvO2 74.3% 57 yo M with HFrEF (LVIDd 6.4 cm, LVEF 32%), CAD s/p PCI (2008), HTN, DMT2 (A1c 8.3%) and CVA s/p TPA (2018), recently treated for PNA who initially presented to [facility] via EMS after syncope reportedly requiring defibrilation. Treated for ACS but left AMA to come to [facility]. On arrival ECG with ST depression in lateral leads. Intubated 11/1 for respiratory failure. Found to have COVID. L/RHC 11/1revealing  of LAD and RCA, elevated filling pressures and CI of 1.5 prompting placement of IABP. Extubated 11/3. IABP was weaned to off 11/7, then the following day on 11/8, developed PMVT cardiac arrest with prompt CPR and defibrillation, started on IV Lidocaine and Amio. IABP ultimately replaced on 11/9 for worsening hypotension. TTE 11/11 revealing LV thrombus.     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 AT evaluation launched 11/10, he is ABO A. He was discussed during TSC on 11/16 and was approved for listing, however was found to be Bacteremic with 11/17 Blc Cx growing mixed cultures Staph epi and Enterococcus faecalis and started on IV Vanco. Repeat cultures from 11/18 reveal NGTD and therefore was cleared by ID for listing.     He appears adequately supported on IABP at 1:1, electrically quiescent on oral Amiodarone. Cr is improving with holding diuretics. Labs otherwise notable for worsening thrombocytopenia. Awaiting suitable donor. Now with GM + rods (Cutibacterium) in blood culture on 11/30 (reported on 12/4), restarted on vancomycin, and status 7, repeat cultures now negative x48 hours (12/6),  now status 2 again.  c/b bilat UE pruritic rash.        Cardiac Studies  11/21/23 TTE: limited unable to rule out endocarditis, technically difficult study  11/1123 TTE: LVEF 22% (global), LV thrombus, normal RV size and function, mild MR, trace TR  11/1/23  LHC showed pLAD 70 % stenosis in the ostium portion of the segment and 100 % in-stent restenosis and in mRCA, 100 % stenosis.   11/1/23 RHC; RA 23 Vw 24, PA 52/33/40, PCWP 30 Vw 33, AO 85/66/73, PA sat 54.4%, CO/CI (F) 2.96/1.44, IABP was placed during the cath through R common femoral artery.   11/1/23 TTE: LVIDd 6.4cm , LVEF 32%, regional WMA, grade II DD,  TAPSE 1.7cm, mld MR, trace TR,     Bedside hemodynamics  11/25 IABP 1:1: CVP 3, PA  33-36/12 mean PA 20-23 PCWP 15-16, MVO2 72.0%, CO/CI 6.2/2.8,   dsc? (PVR 0.8-1.1 [facility] calculated by me)   11/3: IABP 1:1 RA 5; PA 27/12/18; CO/CI 5.6/2.6; MAP 90-100s.  11/4/23 IABP 1:3 CVP 4 PA 37/18 SvO2 83.8 CO/CI 11.2 CI 5.1  (in the setting of fever to 38.3C)  11/5 IABP 1:1 CVP 3 PA 48/27 SvO2 78.4% CO 8.2 CI 3.7   11/1 (IABP 1:1): CVP 1, PA 33/5/16, MvO2 74.3%

## 2023-12-08 NOTE — PROGRESS NOTE ADULT - ATTENDING COMMENTS
History of anxiety disorder and CAD s/p PCI  Admitted with cardiogenic shock and respiratory failure in the setting of stent restenosis  Transferred out and had VT/VF cardiac arrest  Readmitted to CICU  Listed status 2 for heart transplant   A+Ox3, anxious - continue Atarax prn  Cardiogenic shock requiring IABP, also on Bidil for afterload reduction - will continue   S/p VT/VF arrest on Amiodarone PO and Coreg  SpO2 mid 90s on room air  Soft diet  Likely CKD post arrest, dry on exam  Continue to hold diuretics  H/H low but acceptable on Heparin drip for LV thrombus and IABP  Afebrile, +bcx was a contaminant per ID, Vancomycin stopped  Sugars controlled on Lantus/Admelog  Dermatology recommended steroid cream for rash on b/l UEs but patient has deferred  IABP   Will place ultrasound IV as patient's peripheral IVs have

## 2023-12-08 NOTE — PROGRESS NOTE ADULT - PROBLEM SELECTOR PLAN 4
- Cr on admission 1.2, peaked to 4--> 1.06-> 1.3 ->1.41-->1.35-->1.21  - Support as above.  -monitor closely  - Appreciate CardioRenal input.

## 2023-12-08 NOTE — PROGRESS NOTE ADULT - SUBJECTIVE AND OBJECTIVE BOX
DEVORAH VALENCIA  MRN-[id number]  Patient is a 58y old  Male who presents with a chief complaint of Cardiogenic shock (07 Dec 2023 20:10)    HPI:  pt seen and approx 1:30 pm  in [facility]    59yo M w/ hx HTN, CAD w/ 1 stent in , ICH () presenting with abn ekg. Patient presented to [facility] where he was found to have STEMI, recommended to get cath however patient did not want to get it there so it left and came here.  Patient initially had cough, congestion, fever, was placed on antibiotics on .  Started feeling nauseous and had a presyncopal event after which he presented to ED last night.  Had chest pain as well.  Chest pain is midsternal.  Not currently having chest pain.  Received 4 aspirin 30 min pta. (2023 15:11)    Hospital Course:  24 HOUR EVENTS:  REVIEW OF SYSTEMS:    CONSTITUTIONAL: No weakness, fevers or chills  EYES/ENT: No visual changes;  No vertigo or throat pain   NECK: No pain or stiffness  RESPIRATORY: No cough, wheezing, hemoptysis; No shortness of breath  CARDIOVASCULAR: No chest pain or palpitations  GASTROINTESTINAL: No abdominal or epigastric pain. No nausea, vomiting, or hematemesis; No diarrhea or constipation. No melena or hematochezia.  GENITOURINARY: No dysuria, frequency or hematuria  NEUROLOGICAL: No numbness or weakness  SKIN: No itching, rashes    ICU Vital Signs Last 24 Hrs  T(C): 36.6 (08 Dec 2023 17:00), Max: 37.1 (08 Dec 2023 03:00)  T(F): 97.9 (08 Dec 2023 17:00), Max: 98.7 (08 Dec 2023 03:00)  HR: 80 (08 Dec 2023 19:00) (76 - 87)  BP: --  BP(mean): --  ABP: --  ABP(mean): --  RR: 26 (08 Dec 2023 19:00) (14 - 26)  SpO2: 98% (08 Dec 2023 18:00) (95% - 98%)    O2 Parameters below as of 08 Dec 2023 18:00  Patient On (Oxygen Delivery Method): room air    CVP(mm Hg): --  CO: --  CI: --  PA: --  PA(mean): --  PA(direct): --  PCWP: --  LA: --  RA: --  SVR: --  SVRI: --  PVR: --  PVRI: --  I&O's Summary    07 Dec 2023 07:01  -  08 Dec 2023 07:00  --------------------------------------------------------  IN: 966 mL / OUT: 1450 mL / NET: -484 mL    08 Dec 2023 07:01  -  08 Dec 2023 19:59  --------------------------------------------------------  IN: 648 mL / OUT: 1300 mL / NET: -652 mL      CAPILLARY BLOOD GLUCOSE    CAPILLARY BLOOD GLUCOSE      POCT Blood Glucose.: 102 mg/dL (08 Dec 2023 17:03)    PHYSICAL EXAM:  GENERAL: No acute distress, well-developed  HEAD:  Atraumatic, Normocephalic  EYES: EOMI, PERRLA, conjunctiva and sclera clear  NECK: Supple, no lymphadenopathy, no JVD  CHEST/LUNG: CTAB; No wheezes, rales, or rhonchi  HEART: Regular rate and rhythm. Normal S1/S2. No murmurs, rubs, or gallops  ABDOMEN: Soft, non-tender, non-distended; normal bowel sounds, no organomegaly  EXTREMITIES:  2+ peripheral pulses b/l, No clubbing, cyanosis, or edema  NEUROLOGY: A&O x 3, no focal deficits  SKIN: No rashes or lesions  ============================I/O===========================   I&O's Detail    07 Dec 2023 07:01  -  08 Dec 2023 07:00  --------------------------------------------------------  IN:    Heparin: 336 mL    IV PiggyBack: 100 mL    Oral Fluid: 530 mL  Total IN: 966 mL    OUT:    Voided (mL): 1450 mL  Total OUT: 1450 mL    Total NET: -484 mL      08 Dec 2023 07:01  -  08 Dec 2023 19:59  --------------------------------------------------------  IN:    Heparin: 168 mL    Oral Fluid: 480 mL  Total IN: 648 mL    OUT:    Voided (mL): 1300 mL  Total OUT: 1300 mL    Total NET: -652 mL      ============================ LABS =========================                        11.6   7.57  )-----------( 126      ( 08 Dec 2023 01:22 )             34.9     12-08    134<L>  |  103  |  36<H>  ----------------------------<  215<H>  4.3   |  21<L>  |  1.21    Ca    9.9      08 Dec 2023 01:23  Phos  3.2     12-  Mg     1.7     12-    TPro  6.8  /  Alb  4.0  /  TBili  0.2  /  DBili  x   /  AST  40  /  ALT  110<H>  /  AlkPhos  60  12-08      LIVER FUNCTIONS - ( 08 Dec 2023 01:23 )  Alb: 4.0 g/dL / Pro: 6.8 g/dL / ALK PHOS: 60 U/L / ALT: 110 U/L / AST: 40 U/L / GGT: x           PT/INR - ( 08 Dec 2023 01:22 )   PT: 11.3 sec;   INR: 1.03 ratio         PTT - ( 08 Dec 2023 01:22 )  PTT:65.7 sec    Lactate, Blood: 1.5 mmol/L (23 @ 01:22)  Lactate, Blood: 1.7 mmol/L (23 @ 00:58)  Lactate, Blood: 1.2 mmol/L (23 @ 01:49)    Urinalysis Basic - ( 08 Dec 2023 01:23 )    Color: x / Appearance: x / SG: x / pH: x  Gluc: 215 mg/dL / Ketone: x  / Bili: x / Urobili: x   Blood: x / Protein: x / Nitrite: x   Leuk Esterase: x / RBC: x / WBC x   Sq Epi: x / Non Sq Epi: x / Bacteria: x      ======================Micro/Rad/Cardio=================  Telemtry: Reviewed   EKG: Reviewed  CXR: Reviewed  Culture: Reviewed   Echo:   Cath: Cardiac Cath Lab - Adult:   [facility]  Department of Cardiology  [address] [place] [address]  [phone]  Cath Lab Report -- Comprehensive Report  Patient: LD VALENCIA  Study date: 2017  Account number: [id number]  MR number: [id number]  : 1965  Gender: Male  Race: W  Case Physician(s):  Jairon Holguin M.D.  Referring Physician:  Luc Lynn M.D.  INDICATIONS: Unstable angina - CCS4.  HISTORY: The patient has a history of coronary artery disease. The patient  hashypertension and medication-treated dyslipidemia.  PROCEDURE:  --  Left heart catheterization with ventriculography.  --  Left coronary angiography.  --  Right coronary angiography.  TECHNIQUE: The risks and alternatives of the procedures and conscious  sedation were explained to the patient and informed consent was obtained.  Cardiac catheterization performed electively.  Local anesthetic given. Right radial artery access. A 6FR PRELUDE KIT was  inserted in the vessel. Left heart catheterization. Ventriculography was  performed. A 5FR FR4.0 EXPO catheter was utilized. Left coronary artery  angiography. The vessel was injected utilizing a 5FR FL3.5 EXPO catheter.  Right coronary artery angiography. The vessel was injected utilizing a 5FR  FR4.0 EXPO catheter. RADIATION EXPOSURE: 1.1 min.  CONTRAST GIVEN: Omnipaque 55 ml.  MEDICATIONS GIVEN: Midazolam, 1 mg, IV. Fentanyl, 25 mcg, IV. Verapamil  (Isoptin, Calan, Covera), 2.5 mg, IA. Heparin, 3000 units, IA.  VENTRICLES: Global left ventricular function was moderately depressed. EF  estimated was 40 %.  CORONARY VESSELS: The coronary circulation is right dominant.  LM:   --  LM: Normal.  LAD:   --  Proximal LAD: There was a 50 % stenosis.  CX:   --  Circumflex: Normal.  RCA:   --  Mid RCA: There was a 40 % stenosis.  --  Distal RCA: There was a 50 % stenosis.  COMPLICATIONS: There were no complications.  DIAGNOSTIC RECOMMENDATIONS: The patient should continue with the present  medications.  Prepared and signed by  Jairon Holguin M.D.  Signed 2017 12:20:13  HEMODYNAMIC TABLES  Pressures:  Baseline/ Room Air  Pressures:  - HR: 78  Pressures:  - Rhythm:  Pressures:  -- Aortic Pressure (S/D/M): --/--/99  Pressures:  -- Left Ventricle (s/edp): 157/39/--  Outputs:  Baseline/ Room Air  Outputs:  -- CALCULATIONS[age]  Outputs:  -- CALCULATIONS: Body Surface Area: 2.05  Outputs:  -- CALCULATIONS: Height in cm: 175.00  Outputs:  -- CALCULATIONS: Sex: Male  Outputs:  -- CALCULATIONS: Weight in k.40 (17 @ 21:55)    ======================================================  PAST MEDICAL & SURGICAL HISTORY:  HTN (hypertension)    CAD (coronary artery disease)  ; stent    Intracranial hemorrhage      Respiratory arrest      Myocardial infarction, unspecified MI type, unspecified artery    History of coronary artery stent placement      Assessment and Plan:   · Assessment    58 male with HTN, CAD (s/p PCI ), HFrEF, CVA , and T2DM presenting with chest pressure and unknown tachycardia that was shocked x1, [facility]  found to have in-stent restenosis of pLAD and  of RCA with elevated RA and PA pressures and severely decreased. Admitted to CICU for management of cardiogenic shock and ADHF requiring IABP  -, with hospital course c/b vfib arrest requiring reinsertion of IABP. Not recommended for ATs per HF. Currently listed for transplant status 2.    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 AT evaluation launched 11/10, he is ABO A. He was discussed during TSC on  and was approved for listing, however was found to be Bacteremic with  Blc Cx growing mixed cultures Staph epi and Enterococcus faecalis and started on IV Vanco. Repeat cultures from  reveal NGTD and therefore was cleared by ID for listing.     He appears adequately supported on IABP at 1:1, electrically quiescent on oral Amiodarone. Cr is improving with holding diuretics. Labs otherwise notable for worsening thrombocytopenia. Awaiting suitable donor. Now with GM + rods in blood culture on  (reported on ), restarted on vancomycin, and status 7, repeat cultures now negative x48 hours (),  now status 2 again.       ====================== NEUROLOGY=====================  Anxiety  - No Seroquel/antipsychotics since vfib arrest and prolonged QTC  - Psych Eval, recommended SSRI, but pt. refused   - Psych recommending atarax PRN  - Continue to monitor mental status    PT/Conditioning  - Continue band exercised while on bedrest s/t IABP    ==================== RESPIRATORY======================  Acute Hypoxemic Respiratory Failure  - s/p x2 intubations for cardiogenic pulm edema and the in setting of cardiac arrest, resolved - extubated 11/10  - Currently maintaining >95% sats on room air  - Continue incentive spirometry and monitoring of sp02    Asthma  - c/w albuterol, symbicort and spiriva  - On trelegy at home  - Continue to monitor SpO2 with goal >94%    ====================CARDIOVASCULAR==================  Vfib arrest i/s/o ischemia  - Lido gtt off   - PO Amio load - total of 5g per EP complete , continue PO amio  - Amio held for rising LFTs, continue to hold  - Keep K > 4, Mag > 2.2     Cardiogenic shock requiring IABP (- , -)  - Likely 2/2 NSTEMI and ADHF  -  LHC: pLAD 100 % in-stent restenosis & mRCA, 100 %. PCWP 30. IABP placed.  -  TTE: LV dilated. EF 32 %. Regional WMAs present, mod (grade 2) LV diastolic dysfunction  -  TTE: EF 22% and + LV thrombus  - IABP swapped  to RFA, continue 1:1 support  - Off Milrinone gtt @ 7:30 am   - James d/c'd due to elevated K levels  - c/w coreg 25 BID for GDMT  - HIT and HAIDER sent (12/3) as per HF   - UNOS status 2 as of   -  - reduced hydralazine 100 TID to 75 TID and ISD 40 TID to 30 TID for AL reduction    NSTEMI iso stent re-occlusion of pLAD and 100%  of RCA  - EKG on admission w/ LBBB  - DAPT: c/w ASA, Brilinta d/c'd per transplant w/u  - c/w lipitor 80  - cMR deferred given necessity of IABP  - CT sx not recommending CABG, undergoing AT eval    LV thrombus  - c/w heparin gtt    ===================== RENAL =========================  Non-oliguric ARIC, resolved   - Baseline Cr: 1-1.  - Renal US: no evidence of renal artery stenosis  - Trend BMP, lytes daily, replace as needed  - Continue Strict I/Os, avoid nephrotoxins    Mild Hyponatremia/Hyperkalemia iso ARIC  - Continue fluid restriction   - Urea powder d/c'd per renal  - Trend daily  - Continue monitoring urine output, lytes, SCr/ BUN  - Replete lytes prn with goal K >4 and Mg >2    =============== GASTROINTESTINAL===================  Constipation/ileus, resolved  - regular diet  - bowel reg prn    ===================ENDO====================  Type 2 DM  - A1c 8.3  - Continue lantus, premeal, low ISS  - continue FS    ===================HEMATOLOGIC/ONC ===================  - H/H & plts stable  - Monitor H/H and plts  - VTE PPX: heparin gtt    ==================INFECTIOUS DISEASE================  # Positive blood culture, resolved  - Repeat BCx drawn  for leukocytosis to 12k - positive on , G+ rods in anaerobic bottle, suspect contaminant  - Repeat cultures x2 sent  per transplant ID recs - no growth to date  - Vancomycin by trough (-)  - Final microbial ID: Cutibacterium acnes, per ID this is likely to be a skin contaminant, vanc dc'd.   - Dental eval  to rule out infectious etiology that would have led to bacteremia, no significant findings on exam.     # Enterococcus faecalis bacteremia, resolved  - BCx + for enterococcus faecalis x2, Staph epi x1 (likely contaminant)- pan sensitive   - Urine cx  + enterococcus faecalis  - BCx  no growth   - IABP site swapped to RFA   - s/p Vancomycin 1g q12h (-)  - CT A/P negative for infectious pathology    # COVID, resolved  - Off airborne precautions     # Pre-transplant ID w/u   - Trend ID recs for serologies   - Colonoscopy  - normal   - Chest CT  - improved LLL aeration  - s/p immunizations    ==================INFECTIOUS DISEASE================  # Upper Extremity Rash  - Patient developed bilateral upper extremity rash after receiving Hepatitis A & B immunizations on   - Dermatology consulted  - not likely to be related to vanco, can continue per ID recs  - Recommend clobetasol 0.05% BID to affected areas   - RVP repeated to rule out viral etiology, negative        Patient requires continuous monitoring with bedside rhythm monitoring, pulse ox monitoring, and intermittent blood gas analysis. Care plan discussed with ICU care team. Patient remained critical and at risk for life threatening decompensation.  Patient seen, examined and plan discussed with CCU team during rounds.   I have personally provided ___ minutes of critical care time excluding time spent on separate procedures, in addition to initial critical care time provided by the CICU Attending, Dr. Lees  By signing my name below, I, Eleonora Marshall, attest that this documentation has been prepared under the direction and in the presence of KARYN Choi  Electronically signed: Rosalba Talavera, 23 @ 19:59  I, KARYN Choi , personally performed the services described in this documentation. all medical record entries made by the eileenibmartha were at my direction and in my presence. I have reviewed the chart and agree that the record reflects my personal performance and is accurate and complete  Electronically signed: KARYN Choi DEVORAH VALENCIA  MRN-[id number]  Patient is a 58y old  Male who presents with a chief complaint of Cardiogenic shock (07 Dec 2023 20:10)    HPI:  pt seen and approx 1:30 pm  in [facility]    57yo M w/ hx HTN, CAD w/ 1 stent in , ICH () presenting with abn ekg. Patient presented to [facility] where he was found to have STEMI, recommended to get cath however patient did not want to get it there so it left and came here.  Patient initially had cough, congestion, fever, was placed on antibiotics on .  Started feeling nauseous and had a presyncopal event after which he presented to ED last night.  Had chest pain as well.  Chest pain is midsternal.  Not currently having chest pain.  Received 4 aspirin 30 min pta. (2023 15:11)    Hospital Course:  24 HOUR EVENTS:  REVIEW OF SYSTEMS:    CONSTITUTIONAL: No weakness, fevers or chills  EYES/ENT: No visual changes;  No vertigo or throat pain   NECK: No pain or stiffness  RESPIRATORY: No cough, wheezing, hemoptysis; No shortness of breath  CARDIOVASCULAR: No chest pain or palpitations  GASTROINTESTINAL: No abdominal or epigastric pain. No nausea, vomiting, or hematemesis; No diarrhea or constipation. No melena or hematochezia.  GENITOURINARY: No dysuria, frequency or hematuria  NEUROLOGICAL: No numbness or weakness  SKIN: No itching, rashes    ICU Vital Signs Last 24 Hrs  T(C): 36.6 (08 Dec 2023 17:00), Max: 37.1 (08 Dec 2023 03:00)  T(F): 97.9 (08 Dec 2023 17:00), Max: 98.7 (08 Dec 2023 03:00)  HR: 80 (08 Dec 2023 19:00) (76 - 87)  BP: --  BP(mean): --  ABP: --  ABP(mean): --  RR: 26 (08 Dec 2023 19:00) (14 - 26)  SpO2: 98% (08 Dec 2023 18:00) (95% - 98%)    O2 Parameters below as of 08 Dec 2023 18:00  Patient On (Oxygen Delivery Method): room air    CVP(mm Hg): --  CO: --  CI: --  PA: --  PA(mean): --  PA(direct): --  PCWP: --  LA: --  RA: --  SVR: --  SVRI: --  PVR: --  PVRI: --  I&O's Summary    07 Dec 2023 07:01  -  08 Dec 2023 07:00  --------------------------------------------------------  IN: 966 mL / OUT: 1450 mL / NET: -484 mL    08 Dec 2023 07:01  -  08 Dec 2023 19:59  --------------------------------------------------------  IN: 648 mL / OUT: 1300 mL / NET: -652 mL      CAPILLARY BLOOD GLUCOSE    CAPILLARY BLOOD GLUCOSE      POCT Blood Glucose.: 102 mg/dL (08 Dec 2023 17:03)    PHYSICAL EXAM:  GENERAL: No acute distress, well-developed  HEAD:  Atraumatic, Normocephalic  EYES: EOMI, PERRLA, conjunctiva and sclera clear  NECK: Supple, no lymphadenopathy, no JVD  CHEST/LUNG: CTAB; No wheezes, rales, or rhonchi  HEART: Regular rate and rhythm. Normal S1/S2. No murmurs, rubs, or gallops  ABDOMEN: Soft, non-tender, non-distended; normal bowel sounds, no organomegaly  EXTREMITIES:  2+ peripheral pulses b/l, No clubbing, cyanosis, or edema  NEUROLOGY: A&O x 3, no focal deficits  SKIN: No rashes or lesions  ============================I/O===========================   I&O's Detail    07 Dec 2023 07:01  -  08 Dec 2023 07:00  --------------------------------------------------------  IN:    Heparin: 336 mL    IV PiggyBack: 100 mL    Oral Fluid: 530 mL  Total IN: 966 mL    OUT:    Voided (mL): 1450 mL  Total OUT: 1450 mL    Total NET: -484 mL      08 Dec 2023 07:01  -  08 Dec 2023 19:59  --------------------------------------------------------  IN:    Heparin: 168 mL    Oral Fluid: 480 mL  Total IN: 648 mL    OUT:    Voided (mL): 1300 mL  Total OUT: 1300 mL    Total NET: -652 mL      ============================ LABS =========================                        11.6   7.57  )-----------( 126      ( 08 Dec 2023 01:22 )             34.9     12-08    134<L>  |  103  |  36<H>  ----------------------------<  215<H>  4.3   |  21<L>  |  1.21    Ca    9.9      08 Dec 2023 01:23  Phos  3.2     12-  Mg     1.7     12-    TPro  6.8  /  Alb  4.0  /  TBili  0.2  /  DBili  x   /  AST  40  /  ALT  110<H>  /  AlkPhos  60  12-08      LIVER FUNCTIONS - ( 08 Dec 2023 01:23 )  Alb: 4.0 g/dL / Pro: 6.8 g/dL / ALK PHOS: 60 U/L / ALT: 110 U/L / AST: 40 U/L / GGT: x           PT/INR - ( 08 Dec 2023 01:22 )   PT: 11.3 sec;   INR: 1.03 ratio         PTT - ( 08 Dec 2023 01:22 )  PTT:65.7 sec    Lactate, Blood: 1.5 mmol/L (23 @ 01:22)  Lactate, Blood: 1.7 mmol/L (23 @ 00:58)  Lactate, Blood: 1.2 mmol/L (23 @ 01:49)    Urinalysis Basic - ( 08 Dec 2023 01:23 )    Color: x / Appearance: x / SG: x / pH: x  Gluc: 215 mg/dL / Ketone: x  / Bili: x / Urobili: x   Blood: x / Protein: x / Nitrite: x   Leuk Esterase: x / RBC: x / WBC x   Sq Epi: x / Non Sq Epi: x / Bacteria: x      ======================Micro/Rad/Cardio=================  Telemtry: Reviewed   EKG: Reviewed  CXR: Reviewed  Culture: Reviewed   Echo:   Cath: Cardiac Cath Lab - Adult:   [facility]  Department of Cardiology  [address] [place] [address]  [phone]  Cath Lab Report -- Comprehensive Report  Patient: LD VALENCIA  Study date: 2017  Account number: [id number]  MR number: [id number]  : 1965  Gender: Male  Race: W  Case Physician(s):  Jairon Holguin M.D.  Referring Physician:  Luc Lynn M.D.  INDICATIONS: Unstable angina - CCS4.  HISTORY: The patient has a history of coronary artery disease. The patient  hashypertension and medication-treated dyslipidemia.  PROCEDURE:  --  Left heart catheterization with ventriculography.  --  Left coronary angiography.  --  Right coronary angiography.  TECHNIQUE: The risks and alternatives of the procedures and conscious  sedation were explained to the patient and informed consent was obtained.  Cardiac catheterization performed electively.  Local anesthetic given. Right radial artery access. A 6FR PRELUDE KIT was  inserted in the vessel. Left heart catheterization. Ventriculography was  performed. A 5FR FR4.0 EXPO catheter was utilized. Left coronary artery  angiography. The vessel was injected utilizing a 5FR FL3.5 EXPO catheter.  Right coronary artery angiography. The vessel was injected utilizing a 5FR  FR4.0 EXPO catheter. RADIATION EXPOSURE: 1.1 min.  CONTRAST GIVEN: Omnipaque 55 ml.  MEDICATIONS GIVEN: Midazolam, 1 mg, IV. Fentanyl, 25 mcg, IV. Verapamil  (Isoptin, Calan, Covera), 2.5 mg, IA. Heparin, 3000 units, IA.  VENTRICLES: Global left ventricular function was moderately depressed. EF  estimated was 40 %.  CORONARY VESSELS: The coronary circulation is right dominant.  LM:   --  LM: Normal.  LAD:   --  Proximal LAD: There was a 50 % stenosis.  CX:   --  Circumflex: Normal.  RCA:   --  Mid RCA: There was a 40 % stenosis.  --  Distal RCA: There was a 50 % stenosis.  COMPLICATIONS: There were no complications.  DIAGNOSTIC RECOMMENDATIONS: The patient should continue with the present  medications.  Prepared and signed by  Jairon Holguin M.D.  Signed 2017 12:20:13  HEMODYNAMIC TABLES  Pressures:  Baseline/ Room Air  Pressures:  - HR: 78  Pressures:  - Rhythm:  Pressures:  -- Aortic Pressure (S/D/M): --/--/99  Pressures:  -- Left Ventricle (s/edp): 157/39/--  Outputs:  Baseline/ Room Air  Outputs:  -- CALCULATIONS: Age in years: 51.41  Outputs:  -- CALCULATIONS: Body Surface Area: 2.05  Outputs:  -- CALCULATIONS: Height in cm: 175.00  Outputs:  -- CALCULATIONS: Sex: Male  Outputs:  -- CALCULATIONS: Weight in k.40 (17 @ 21:55)    ======================================================  PAST MEDICAL & SURGICAL HISTORY:  HTN (hypertension)    CAD (coronary artery disease)  ; stent    Intracranial hemorrhage      Respiratory arrest      Myocardial infarction, unspecified MI type, unspecified artery    History of coronary artery stent placement      Assessment and Plan:   · Assessment    58 male with HTN, CAD (s/p PCI ), HFrEF, CVA , and T2DM presenting with chest pressure and unknown tachycardia that was shocked x1, [facility]  found to have in-stent restenosis of pLAD and  of RCA with elevated RA and PA pressures and severely decreased. Admitted to CICU for management of cardiogenic shock and ADHF requiring IABP  -, with hospital course c/b vfib arrest requiring reinsertion of IABP. Not recommended for ATs per HF. Currently listed for transplant status 2.    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 AT evaluation launched 11/10, he is ABO A. He was discussed during TSC on  and was approved for listing, however was found to be Bacteremic with  Blc Cx growing mixed cultures Staph epi and Enterococcus faecalis and started on IV Vanco. Repeat cultures from  reveal NGTD and therefore was cleared by ID for listing.     He appears adequately supported on IABP at 1:1, electrically quiescent on oral Amiodarone. Cr is improving with holding diuretics. Labs otherwise notable for worsening thrombocytopenia. Awaiting suitable donor. Now with GM + rods in blood culture on  (reported on ), restarted on vancomycin, and status 7, repeat cultures now negative x48 hours (),  now status 2 again.       ====================== NEUROLOGY=====================  Anxiety  - No Seroquel/antipsychotics since vfib arrest and prolonged QTC  - Psych Eval, recommended SSRI, but pt. refused   - Psych recommending atarax PRN  - Continue to monitor mental status    PT/Conditioning  - Continue band exercised while on bedrest s/t IABP    ==================== RESPIRATORY======================  Acute Hypoxemic Respiratory Failure  - s/p x2 intubations for cardiogenic pulm edema and the in setting of cardiac arrest, resolved - extubated 11/10  - Currently maintaining >95% sats on room air  - Continue incentive spirometry and monitoring of sp02    Asthma  - c/w albuterol, symbicort and spiriva  - On trelegy at home  - Continue to monitor SpO2 with goal >94%    ====================CARDIOVASCULAR==================  Vfib arrest i/s/o ischemia  - Lido gtt off   - PO Amio load - total of 5g per EP complete , continue PO amio  - Amio held for rising LFTs, continue to hold  - Keep K > 4, Mag > 2.2     Cardiogenic shock requiring IABP (- , -)  - Likely 2/2 NSTEMI and ADHF  -  LHC: pLAD 100 % in-stent restenosis & mRCA, 100 %. PCWP 30. IABP placed.  -  TTE: LV dilated. EF 32 %. Regional WMAs present, mod (grade 2) LV diastolic dysfunction  -  TTE: EF 22% and + LV thrombus  - IABP swapped  to RFA, continue 1:1 support  - Off Milrinone gtt @ 7:30 am   - James d/c'd due to elevated K levels  - c/w coreg 25 BID for GDMT  - HIT and HAIDER sent (12/3) as per HF   - UNOS status 2 as of   -  - reduced hydralazine 100 TID to 75 TID and ISD 40 TID to 30 TID for AL reduction    NSTEMI iso stent re-occlusion of pLAD and 100%  of RCA  - EKG on admission w/ LBBB  - DAPT: c/w ASA, Brilinta d/c'd per transplant w/u  - c/w lipitor 80  - cMR deferred given necessity of IABP  - CT sx not recommending CABG, undergoing AT eval    LV thrombus  - c/w heparin gtt    ===================== RENAL =========================  Non-oliguric ARIC, resolved   - Baseline Cr: 1-1.  - Renal US: no evidence of renal artery stenosis  - Trend BMP, lytes daily, replace as needed  - Continue Strict I/Os, avoid nephrotoxins    Mild Hyponatremia/Hyperkalemia iso ARIC  - Continue fluid restriction   - Urea powder d/c'd per renal  - Trend daily  - Continue monitoring urine output, lytes, SCr/ BUN  - Replete lytes prn with goal K >4 and Mg >2    =============== GASTROINTESTINAL===================  Constipation/ileus, resolved  - regular diet  - bowel reg prn    ===================ENDO====================  Type 2 DM  - A1c 8.3  - Continue lantus, premeal, low ISS  - continue FS    ===================HEMATOLOGIC/ONC ===================  - H/H & plts stable  - Monitor H/H and plts  - VTE PPX: heparin gtt    ==================INFECTIOUS DISEASE================  # Positive blood culture, resolved  - Repeat BCx drawn  for leukocytosis to 12k - positive on , G+ rods in anaerobic bottle, suspect contaminant  - Repeat cultures x2 sent  per transplant ID recs - no growth to date  - Vancomycin by trough (-)  - Final microbial ID: Cutibacterium acnes, per ID this is likely to be a skin contaminant, vanc dc'd.   - Dental eval  to rule out infectious etiology that would have led to bacteremia, no significant findings on exam.     # Enterococcus faecalis bacteremia, resolved  - BCx + for enterococcus faecalis x2, Staph epi x1 (likely contaminant)- pan sensitive   - Urine cx  + enterococcus faecalis  - BCx  no growth   - IABP site swapped to RFA   - s/p Vancomycin 1g q12h (-)  - CT A/P negative for infectious pathology    # COVID, resolved  - Off airborne precautions     # Pre-transplant ID w/u   - Trend ID recs for serologies   - Colonoscopy  - normal   - Chest CT  - improved LLL aeration  - s/p immunizations    ==================INFECTIOUS DISEASE================  # Upper Extremity Rash  - Patient developed bilateral upper extremity rash after receiving Hepatitis A & B immunizations on   - Dermatology consulted  - not likely to be related to vanco, can continue per ID recs  - Recommend clobetasol 0.05% BID to affected areas   - RVP repeated to rule out viral etiology, negative        Patient requires continuous monitoring with bedside rhythm monitoring, pulse ox monitoring, and intermittent blood gas analysis. Care plan discussed with ICU care team. Patient remained critical and at risk for life threatening decompensation.  Patient seen, examined and plan discussed with CCU team during rounds.   I have personally provided ___ minutes of critical care time excluding time spent on separate procedures, in addition to initial critical care time provided by the CICU Attending, Dr. Lees  By signing my name below, I, Eleonora Marshall, attest that this documentation has been prepared under the direction and in the presence of KARYN Choi  Electronically signed: Rosalba Talavera, 23 @ 19:59  I, KARYN Choi , personally performed the services described in this documentation. all medical record entries made by the eileenibmartha were at my direction and in my presence. I have reviewed the chart and agree that the record reflects my personal performance and is accurate and complete  Electronically signed: KARYN Choi DEVORAH VALENCIA  MRN-[id number]  Patient is a 58y old  Male who presents with a chief complaint of Cardiogenic shock (07 Dec 2023 20:10)    HPI:  57yo M w/ hx HTN, CAD w/ 1 stent in , ICH () presenting with abn ekg. Patient presented to [facility] where he was found to have STEMI, recommended to get cath however patient did not want to get it there so it left and came here.  Patient initially had cough, congestion, fever, was placed on antibiotics on .  Started feeling nauseous and had a presyncopal event after which he presented to ED last night.  Had chest pain as well.  Chest pain is midsternal.  Not currently having chest pain.  Received 4 aspirin 30 min pta. (2023 15:11)    Hospital Course:  24 HOUR EVENTS:  - UNOS status 2 for OHT  - No acute changes    REVIEW OF SYSTEMS:  CONSTITUTIONAL: No weakness, fevers or chills  EYES/ENT: No visual changes;  No vertigo or throat pain   NECK: No pain or stiffness  RESPIRATORY: No cough, wheezing, hemoptysis; No shortness of breath  CARDIOVASCULAR: No chest pain or palpitations  GASTROINTESTINAL: No abdominal or epigastric pain. No nausea, vomiting, or hematemesis; No diarrhea or constipation. No melena or hematochezia.  GENITOURINARY: No dysuria, frequency or hematuria  NEUROLOGICAL: No numbness or weakness  SKIN: No itching, rashes    ICU Vital Signs Last 24 Hrs  T(C): 36.6 (08 Dec 2023 17:00), Max: 37.1 (08 Dec 2023 03:00)  T(F): 97.9 (08 Dec 2023 17:00), Max: 98.7 (08 Dec 2023 03:00)  HR: 80 (08 Dec 2023 19:00) (76 - 87)  BP: --  BP(mean): --  ABP: --  ABP(mean): --  RR: 26 (08 Dec 2023 19:00) (14 - 26)  SpO2: 98% (08 Dec 2023 18:00) (95% - 98%)    O2 Parameters below as of 08 Dec 2023 18:00  Patient On (Oxygen Delivery Method): room air    CVP(mm Hg): --  CO: --  CI: --  PA: --  PA(mean): --  PA(direct): --  PCWP: --  LA: --  RA: --  SVR: --  SVRI: --  PVR: --  PVRI: --  I&O's Summary    07 Dec 2023 07:01  -  08 Dec 2023 07:00  --------------------------------------------------------  IN: 966 mL / OUT: 1450 mL / NET: -484 mL    08 Dec 2023 07:  -  08 Dec 2023 19:59  --------------------------------------------------------  IN: 648 mL / OUT: 1300 mL / NET: -652 mL      CAPILLARY BLOOD GLUCOSE    CAPILLARY BLOOD GLUCOSE      POCT Blood Glucose.: 102 mg/dL (08 Dec 2023 17:03)    PHYSICAL EXAM:  GENERAL: No acute distress, well-developed  HEAD:  Atraumatic, Normocephalic  EYES: EOMI, PERRLA, conjunctiva and sclera clear  NECK: Supple, no lymphadenopathy, no JVD  CHEST/LUNG: CTAB; No wheezes, rales, or rhonchi  HEART: Regular rate and rhythm. Normal S1/S2. No murmurs, rubs, or gallops  ABDOMEN: Soft, non-tender, non-distended; normal bowel sounds, no organomegaly  EXTREMITIES:  2+ peripheral pulses b/l, No clubbing, cyanosis, or edema. RF IABP site clean, dry, intact, nontender, no evidence of hematoma  NEUROLOGY: A&O x 3, no focal deficits  SKIN: No rashes or lesions  ============================I/O===========================   I&O's Detail    07 Dec 2023 07:01  -  08 Dec 2023 07:00  --------------------------------------------------------  IN:    Heparin: 336 mL    IV PiggyBack: 100 mL    Oral Fluid: 530 mL  Total IN: 966 mL    OUT:    Voided (mL): 1450 mL  Total OUT: 1450 mL    Total NET: -484 mL      08 Dec 2023 07:01  -  08 Dec 2023 19:59  --------------------------------------------------------  IN:    Heparin: 168 mL    Oral Fluid: 480 mL  Total IN: 648 mL    OUT:    Voided (mL): 1300 mL  Total OUT: 1300 mL    Total NET: -652 mL      ============================ LABS =========================                        11.6   7.57  )-----------( 126      ( 08 Dec 2023 01:22 )             34.9     12-08    134<L>  |  103  |  36<H>  ----------------------------<  215<H>  4.3   |  21<L>  |  1.21    Ca    9.9      08 Dec 2023 01:23  Phos  3.2     12-  Mg     1.7     12-    TPro  6.8  /  Alb  4.0  /  TBili  0.2  /  DBili  x   /  AST  40  /  ALT  110<H>  /  AlkPhos  60  1208      LIVER FUNCTIONS - ( 08 Dec 2023 01:23 )  Alb: 4.0 g/dL / Pro: 6.8 g/dL / ALK PHOS: 60 U/L / ALT: 110 U/L / AST: 40 U/L / GGT: x           PT/INR - ( 08 Dec 2023 01:22 )   PT: 11.3 sec;   INR: 1.03 ratio         PTT - ( 08 Dec 2023 01:22 )  PTT:65.7 sec    Lactate, Blood: 1.5 mmol/L (23 @ 01:22)  Lactate, Blood: 1.7 mmol/L (23 @ 00:58)  Lactate, Blood: 1.2 mmol/L (23 @ 01:49)    Urinalysis Basic - ( 08 Dec 2023 01:23 )    Color: x / Appearance: x / SG: x / pH: x  Gluc: 215 mg/dL / Ketone: x  / Bili: x / Urobili: x   Blood: x / Protein: x / Nitrite: x   Leuk Esterase: x / RBC: x / WBC x   Sq Epi: x / Non Sq Epi: x / Bacteria: x      ======================Micro/Rad/Cardio=================  Telemtry: Reviewed   EKG: Reviewed  CXR: Reviewed  Culture: Reviewed   Echo:   Cath: Cardiac Cath Lab - Adult:   [facility]  Department of Cardiology  [address] [place] [address]  [phone]  Cath Lab Report -- Comprehensive Report  Patient: LD VALENCIA  Study date: 2017  Account number: [id number]  MR number: [id number]  : 1965  Gender: Male  Race: W  Case Physician(s):  Jairon Holguin M.D.  Referring Physician:  Luc Lynn M.D.  INDICATIONS: Unstable angina - CCS4.  HISTORY: The patient has a history of coronary artery disease. The patient  hashypertension and medication-treated dyslipidemia.  PROCEDURE:  --  Left heart catheterization with ventriculography.  --  Left coronary angiography.  --  Right coronary angiography.  TECHNIQUE: The risks and alternatives of the procedures and conscious  sedation were explained to the patient and informed consent was obtained.  Cardiac catheterization performed electively.  Local anesthetic given. Right radial artery access. A 6FR PRELUDE KIT was  inserted in the vessel. Left heart catheterization. Ventriculography was  performed. A 5FR FR4.0 EXPO catheter was utilized. Left coronary artery  angiography. The vessel was injected utilizing a 5FR FL3.5 EXPO catheter.  Right coronary artery angiography. The vessel was injected utilizing a 5FR  FR4.0 EXPO catheter. RADIATION EXPOSURE: 1.1 min.  CONTRAST GIVEN: Omnipaque 55 ml.  MEDICATIONS GIVEN: Midazolam, 1 mg, IV. Fentanyl, 25 mcg, IV. Verapamil  (Isoptin, Calan, Covera), 2.5 mg, IA. Heparin, 3000 units, IA.  VENTRICLES: Global left ventricular function was moderately depressed. EF  estimated was 40 %.  CORONARY VESSELS: The coronary circulation is right dominant.  LM:   --  LM: Normal.  LAD:   --  Proximal LAD: There was a 50 % stenosis.  CX:   --  Circumflex: Normal.  RCA:   --  Mid RCA: There was a 40 % stenosis.  --  Distal RCA: There was a 50 % stenosis.  COMPLICATIONS: There were no complications.  DIAGNOSTIC RECOMMENDATIONS: The patient should continue with the present  medications.  Prepared and signed by  Jairon Holguin M.D.  Signed 2017 12:20:13  HEMODYNAMIC TABLES  Pressures:  Baseline/ Room Air  Pressures:  - HR: 78  Pressures:  - Rhythm:  Pressures:  -- Aortic Pressure (S/D/M): --/--/99  Pressures:  -- Left Ventricle (s/edp): 157/39/--  Outputs:  Baseline/ Room Air  Outputs:  -- CALCULATIONS[age]  Outputs:  -- CALCULATIONS: Body Surface Area: 2.05  Outputs:  -- CALCULATIONS: Height in cm: 175.00  Outputs:  -- CALCULATIONS: Sex: Male  Outputs:  -- CALCULATIONS: Weight in k.40 (17 @ 21:55)    ======================================================  PAST MEDICAL & SURGICAL HISTORY:  HTN (hypertension)    CAD (coronary artery disease)  ; stent    Intracranial hemorrhage      Respiratory arrest      Myocardial infarction, unspecified MI type, unspecified artery    History of coronary artery stent placement      Assessment and Plan:   · Assessment    58 male with HTN, CAD (s/p PCI ), HFrEF, CVA , and T2DM presenting with chest pressure and unknown tachycardia that was shocked x1, [facility]  found to have in-stent restenosis of pLAD and  of RCA with elevated RA and PA pressures and severely decreased. Admitted to CICU for management of cardiogenic shock and ADHF requiring IABP  -, with hospital course c/b vfib arrest requiring reinsertion of IABP. Not recommended for ATs per HF. Currently listed for transplant status 2.    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 AT evaluation launched 11/10, he is ABO A. He was discussed during TSC on  and was approved for listing, however was found to be Bacteremic with  Blc Cx growing mixed cultures Staph epi and Enterococcus faecalis and started on IV Vanco. Repeat cultures from  reveal NGTD and therefore was cleared by ID for listing.     He appears adequately supported on IABP at 1:1, electrically quiescent on oral Amiodarone. Cr is improving with holding diuretics. Labs otherwise notable for worsening thrombocytopenia. Awaiting suitable donor. Now with GM + rods in blood culture on  (reported on ), restarted on vancomycin, and status 7, repeat cultures now negative x48 hours (),  now status 2 again.       ====================== NEUROLOGY=====================  Anxiety  - No Seroquel/antipsychotics since vfib arrest and prolonged QTC  - Psych Eval, recommended SSRI, but pt. refused   - Psych recommending atarax PRN  - Continue to monitor mental status    PT/Conditioning  - Continue band exercised while on bedrest s/t IABP    ==================== RESPIRATORY======================  Acute Hypoxemic Respiratory Failure  - s/p x2 intubations for cardiogenic pulm edema and the in setting of cardiac arrest, resolved - extubated 11/10  - Currently maintaining >95% sats on room air  - Continue incentive spirometry and monitoring of sp02    Asthma  - c/w albuterol, symbicort and spiriva  - On trelegy at home  - Continue to monitor SpO2 with goal >94%    ====================CARDIOVASCULAR==================  Vfib arrest i/s/o ischemia  - Lido gtt off   - PO Amio load - total of 5g per EP complete , continue PO amio  - Amio held for rising LFTs, continue to hold  - Keep K > 4, Mag > 2.2     Cardiogenic shock requiring IABP (- , -)  - Likely 2/2 NSTEMI and ADHF  -  LHC: pLAD 100 % in-stent restenosis & mRCA, 100 %. PCWP 30. IABP placed.  -  TTE: LV dilated. EF 32 %. Regional WMAs present, mod (grade 2) LV diastolic dysfunction  -  TTE: EF 22% and + LV thrombus  - IABP swapped  to RFA, continue 1:1 support  - Off Milrinone gtt @ 7:30 am   - James d/c'd due to elevated K levels  - c/w coreg 25 BID for GDMT  - HIT and HAIDER sent (12/3) as per HF   - UNOS status 2 as of   -  - reduced hydralazine 100 TID to 75 TID and ISD 40 TID to 30 TID for AL reduction    NSTEMI iso stent re-occlusion of pLAD and 100%  of RCA  - EKG on admission w/ LBBB  - DAPT: c/w ASA, Brilinta d/c'd per transplant w/u  - c/w lipitor 80  - cMR deferred given necessity of IABP  - CT sx not recommending CABG, undergoing AT eval    LV thrombus  - c/w heparin gtt    ===================== RENAL =========================  Non-oliguric ARIC, resolved   - Baseline Cr: 1-  - Renal US: no evidence of renal artery stenosis  - Trend BMP, lytes daily, replace as needed  - Continue Strict I/Os, avoid nephrotoxins    Mild Hyponatremia/Hyperkalemia iso ARIC  - Continue fluid restriction   - Urea powder d/c'd per renal  - Trend daily  - Continue monitoring urine output, lytes, SCr/ BUN  - Replete lytes prn with goal K >4 and Mg >2    =============== GASTROINTESTINAL===================  Constipation/ileus, resolved  - regular diet  - bowel reg prn    ===================ENDO====================  Type 2 DM  - A1c 8.3  - Continue lantus, premeal, low ISS  - continue FS    ===================HEMATOLOGIC/ONC ===================  - H/H & plts stable  - Monitor H/H and plts  - VTE PPX: heparin gtt    ==================INFECTIOUS DISEASE================  # Positive blood culture, resolved  - Repeat BCx drawn  for leukocytosis to 12k - positive on , G+ rods in anaerobic bottle, suspect contaminant  - Repeat cultures x2 sent  per transplant ID recs - no growth to date  - Vancomycin by trough (-)  - Final microbial ID: Cutibacterium acnes, per ID this is likely to be a skin contaminant, vanc dc'd.   - Dental eval  to rule out infectious etiology that would have led to bacteremia, no significant findings on exam.     # Enterococcus faecalis bacteremia, resolved  - BCx + for enterococcus faecalis x2, Staph epi x1 (likely contaminant)- pan sensitive   - Urine cx  + enterococcus faecalis  - BCx  no growth   - IABP site swapped to RFA   - s/p Vancomycin 1g q12h (-)  - CT A/P negative for infectious pathology    # COVID, resolved  - Off airborne precautions     # Pre-transplant ID w/u   - Trend ID recs for serologies   - Colonoscopy  - normal   - Chest CT  - improved LLL aeration  - s/p immunizations    ==================INFECTIOUS DISEASE================  # Upper Extremity Rash  - Patient developed bilateral upper extremity rash after receiving Hepatitis A & B immunizations on   - Dermatology consulted  - not likely to be related to vanco, can continue per ID recs  - Recommend clobetasol 0.05% BID to affected areas   - RVP repeated to rule out viral etiology, negative        Patient requires continuous monitoring with bedside rhythm monitoring, pulse ox monitoring, and intermittent blood gas analysis. Care plan discussed with ICU care team. Patient remained critical and at risk for life threatening decompensation.  Patient seen, examined and plan discussed with CCU team during rounds.   I have personally provided 35 minutes of critical care time excluding time spent on separate procedures, in addition to initial critical care time provided by the CICU Attending, Dr. Lees  By signing my name below, I, Eleonora Marshall, attest that this documentation has been prepared under the direction and in the presence of KARYN Choi  Electronically signed: Rosalba Talavera, 23 @ 19:59  I, KARYN Choi , personally performed the services described in this documentation. all medical record entries made by the eileenibe were at my direction and in my presence. I have reviewed the chart and agree that the record reflects my personal performance and is accurate and complete  Electronically signed: KARYN Choi DEVORAH VALENCIA  MRN-[id number]  Patient is a 58y old  Male who presents with a chief complaint of Cardiogenic shock (07 Dec 2023 20:10)    HPI:  57yo M w/ hx HTN, CAD w/ 1 stent in , ICH () presenting with abn ekg. Patient presented to [facility] where he was found to have STEMI, recommended to get cath however patient did not want to get it there so it left and came here.  Patient initially had cough, congestion, fever, was placed on antibiotics on .  Started feeling nauseous and had a presyncopal event after which he presented to ED last night.  Had chest pain as well.  Chest pain is midsternal.  Not currently having chest pain.  Received 4 aspirin 30 min pta. (2023 15:11)    Hospital Course:  24 HOUR EVENTS:  - UNOS status 2 for OHT  - No acute changes    REVIEW OF SYSTEMS:  CONSTITUTIONAL: No weakness, fevers or chills  EYES/ENT: No visual changes;  No vertigo or throat pain   NECK: No pain or stiffness  RESPIRATORY: No cough, wheezing, hemoptysis; No shortness of breath  CARDIOVASCULAR: No chest pain or palpitations  GASTROINTESTINAL: No abdominal or epigastric pain. No nausea, vomiting, or hematemesis; No diarrhea or constipation. No melena or hematochezia.  GENITOURINARY: No dysuria, frequency or hematuria  NEUROLOGICAL: No numbness or weakness  SKIN: No itching, rashes    ICU Vital Signs Last 24 Hrs  T(C): 36.6 (08 Dec 2023 17:00), Max: 37.1 (08 Dec 2023 03:00)  T(F): 97.9 (08 Dec 2023 17:00), Max: 98.7 (08 Dec 2023 03:00)  HR: 80 (08 Dec 2023 19:00) (76 - 87)  BP: --  BP(mean): --  ABP: --  ABP(mean): --  RR: 26 (08 Dec 2023 19:00) (14 - 26)  SpO2: 98% (08 Dec 2023 18:00) (95% - 98%)    O2 Parameters below as of 08 Dec 2023 18:00  Patient On (Oxygen Delivery Method): room air    CVP(mm Hg): --  CO: --  CI: --  PA: --  PA(mean): --  PA(direct): --  PCWP: --  LA: --  RA: --  SVR: --  SVRI: --  PVR: --  PVRI: --  I&O's Summary    07 Dec 2023 07:01  -  08 Dec 2023 07:00  --------------------------------------------------------  IN: 966 mL / OUT: 1450 mL / NET: -484 mL    08 Dec 2023 07:  -  08 Dec 2023 19:59  --------------------------------------------------------  IN: 648 mL / OUT: 1300 mL / NET: -652 mL      CAPILLARY BLOOD GLUCOSE    CAPILLARY BLOOD GLUCOSE      POCT Blood Glucose.: 102 mg/dL (08 Dec 2023 17:03)    PHYSICAL EXAM:  GENERAL: No acute distress, well-developed  HEAD:  Atraumatic, Normocephalic  EYES: EOMI, PERRLA, conjunctiva and sclera clear  NECK: Supple, no lymphadenopathy, no JVD  CHEST/LUNG: CTAB; No wheezes, rales, or rhonchi  HEART: Regular rate and rhythm. Normal S1/S2. No murmurs, rubs, or gallops  ABDOMEN: Soft, non-tender, non-distended; normal bowel sounds, no organomegaly  EXTREMITIES:  2+ peripheral pulses b/l, No clubbing, cyanosis, or edema. RF IABP site clean, dry, intact, nontender, no evidence of hematoma  NEUROLOGY: A&O x 3, no focal deficits  SKIN: No rashes or lesions  ============================I/O===========================   I&O's Detail    07 Dec 2023 07:01  -  08 Dec 2023 07:00  --------------------------------------------------------  IN:    Heparin: 336 mL    IV PiggyBack: 100 mL    Oral Fluid: 530 mL  Total IN: 966 mL    OUT:    Voided (mL): 1450 mL  Total OUT: 1450 mL    Total NET: -484 mL      08 Dec 2023 07:01  -  08 Dec 2023 19:59  --------------------------------------------------------  IN:    Heparin: 168 mL    Oral Fluid: 480 mL  Total IN: 648 mL    OUT:    Voided (mL): 1300 mL  Total OUT: 1300 mL    Total NET: -652 mL      ============================ LABS =========================                        11.6   7.57  )-----------( 126      ( 08 Dec 2023 01:22 )             34.9     12-08    134<L>  |  103  |  36<H>  ----------------------------<  215<H>  4.3   |  21<L>  |  1.21    Ca    9.9      08 Dec 2023 01:23  Phos  3.2     12-  Mg     1.7     12-    TPro  6.8  /  Alb  4.0  /  TBili  0.2  /  DBili  x   /  AST  40  /  ALT  110<H>  /  AlkPhos  60  1208      LIVER FUNCTIONS - ( 08 Dec 2023 01:23 )  Alb: 4.0 g/dL / Pro: 6.8 g/dL / ALK PHOS: 60 U/L / ALT: 110 U/L / AST: 40 U/L / GGT: x           PT/INR - ( 08 Dec 2023 01:22 )   PT: 11.3 sec;   INR: 1.03 ratio         PTT - ( 08 Dec 2023 01:22 )  PTT:65.7 sec    Lactate, Blood: 1.5 mmol/L (23 @ 01:22)  Lactate, Blood: 1.7 mmol/L (23 @ 00:58)  Lactate, Blood: 1.2 mmol/L (23 @ 01:49)    Urinalysis Basic - ( 08 Dec 2023 01:23 )    Color: x / Appearance: x / SG: x / pH: x  Gluc: 215 mg/dL / Ketone: x  / Bili: x / Urobili: x   Blood: x / Protein: x / Nitrite: x   Leuk Esterase: x / RBC: x / WBC x   Sq Epi: x / Non Sq Epi: x / Bacteria: x      ======================Micro/Rad/Cardio=================  Telemtry: Reviewed   EKG: Reviewed  CXR: Reviewed  Culture: Reviewed   Echo:   Cath: Cardiac Cath Lab - Adult:   [facility]  Department of Cardiology  [address] [place] [address]  [phone]  Cath Lab Report -- Comprehensive Report  Patient: LD VALENCIA  Study date: 2017  Account number: [id number]  MR number: [id number]  : 1965  Gender: Male  Race: W  Case Physician(s):  Jairon Holguin M.D.  Referring Physician:  Luc Lynn M.D.  INDICATIONS: Unstable angina - CCS4.  HISTORY: The patient has a history of coronary artery disease. The patient  hashypertension and medication-treated dyslipidemia.  PROCEDURE:  --  Left heart catheterization with ventriculography.  --  Left coronary angiography.  --  Right coronary angiography.  TECHNIQUE: The risks and alternatives of the procedures and conscious  sedation were explained to the patient and informed consent was obtained.  Cardiac catheterization performed electively.  Local anesthetic given. Right radial artery access. A 6FR PRELUDE KIT was  inserted in the vessel. Left heart catheterization. Ventriculography was  performed. A 5FR FR4.0 EXPO catheter was utilized. Left coronary artery  angiography. The vessel was injected utilizing a 5FR FL3.5 EXPO catheter.  Right coronary artery angiography. The vessel was injected utilizing a 5FR  FR4.0 EXPO catheter. RADIATION EXPOSURE: 1.1 min.  CONTRAST GIVEN: Omnipaque 55 ml.  MEDICATIONS GIVEN: Midazolam, 1 mg, IV. Fentanyl, 25 mcg, IV. Verapamil  (Isoptin, Calan, Covera), 2.5 mg, IA. Heparin, 3000 units, IA.  VENTRICLES: Global left ventricular function was moderately depressed. EF  estimated was 40 %.  CORONARY VESSELS: The coronary circulation is right dominant.  LM:   --  LM: Normal.  LAD:   --  Proximal LAD: There was a 50 % stenosis.  CX:   --  Circumflex: Normal.  RCA:   --  Mid RCA: There was a 40 % stenosis.  --  Distal RCA: There was a 50 % stenosis.  COMPLICATIONS: There were no complications.  DIAGNOSTIC RECOMMENDATIONS: The patient should continue with the present  medications.  Prepared and signed by  Jairon Holguin M.D.  Signed 2017 12:20:13  HEMODYNAMIC TABLES  Pressures:  Baseline/ Room Air  Pressures:  - HR: 78  Pressures:  - Rhythm:  Pressures:  -- Aortic Pressure (S/D/M): --/--/99  Pressures:  -- Left Ventricle (s/edp): 157/39/--  Outputs:  Baseline/ Room Air  Outputs:  -- CALCULATIONS[age]  Outputs:  -- CALCULATIONS: Body Surface Area: 2.05  Outputs:  -- CALCULATIONS: Height in cm: 175.00  Outputs:  -- CALCULATIONS: Sex: Male  Outputs:  -- CALCULATIONS: Weight in k.40 (17 @ 21:55)    ======================================================  PAST MEDICAL & SURGICAL HISTORY:  HTN (hypertension)    CAD (coronary artery disease)  ; stent    Intracranial hemorrhage      Respiratory arrest      Myocardial infarction, unspecified MI type, unspecified artery    History of coronary artery stent placement      Assessment and Plan:   · Assessment    58 male with HTN, CAD (s/p PCI ), HFrEF, CVA , and T2DM presenting with chest pressure and unknown tachycardia that was shocked x1, [facility]  found to have in-stent restenosis of pLAD and  of RCA with elevated RA and PA pressures and severely decreased. Admitted to CICU for management of cardiogenic shock and ADHF requiring IABP  -, with hospital course c/b vfib arrest requiring reinsertion of IABP. Not recommended for ATs per HF. Currently listed for transplant status 2.    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 AT evaluation launched 11/10, he is ABO A. He was discussed during TSC on  and was approved for listing, however was found to be Bacteremic with  Blc Cx growing mixed cultures Staph epi and Enterococcus faecalis and started on IV Vanco. Repeat cultures from  reveal NGTD and therefore was cleared by ID for listing.     He appears adequately supported on IABP at 1:1, electrically quiescent on oral Amiodarone. Cr is improving with holding diuretics. Labs otherwise notable for worsening thrombocytopenia. Awaiting suitable donor. Now with GM + rods in blood culture on  (reported on ), restarted on vancomycin, and status 7, repeat cultures now negative x48 hours (),  now status 2 again.       ====================== NEUROLOGY=====================  Anxiety  - No Seroquel/antipsychotics since vfib arrest and prolonged QTC  - Psych Eval, recommended SSRI, but pt. refused   - Psych recommending atarax PRN  - Continue to monitor mental status    PT/Conditioning  - Continue band exercised while on bedrest s/t IABP    ==================== RESPIRATORY======================  Acute Hypoxemic Respiratory Failure  - s/p x2 intubations for cardiogenic pulm edema and the in setting of cardiac arrest, resolved - extubated 11/10  - Currently maintaining >95% sats on room air  - Continue incentive spirometry and monitoring of sp02    Asthma  - c/w albuterol, symbicort and spiriva  - On trelegy at home  - Continue to monitor SpO2 with goal >94%    ====================CARDIOVASCULAR==================  Vfib arrest i/s/o ischemia  - Lido gtt off   - PO Amio load - total of 5g per EP complete , continue PO amio  - Amio held for rising LFTs, continue to hold  - Keep K > 4, Mag > 2.2     Cardiogenic shock requiring IABP (- , -)  - Likely 2/2 NSTEMI and ADHF  -  LHC: pLAD 100 % in-stent restenosis & mRCA, 100 %. PCWP 30. IABP placed.  -  TTE: LV dilated. EF 32 %. Regional WMAs present, mod (grade 2) LV diastolic dysfunction  -  TTE: EF 22% and + LV thrombus  - IABP swapped  to RFA, continue 1:1 support  - Off Milrinone gtt @ 7:30 am   - James d/c'd due to elevated K levels  - c/w coreg 25 BID for GDMT  - HIT and HAIDER sent (12/3) as per HF   - UNOS status 2 as of   -  - reduced hydralazine 100 TID to 75 TID and ISD 40 TID to 30 TID for AL reduction    NSTEMI iso stent re-occlusion of pLAD and 100%  of RCA  - EKG on admission w/ LBBB  - DAPT: c/w ASA, Brilinta d/c'd per transplant w/u  - c/w lipitor 80  - cMR deferred given necessity of IABP  - CT sx not recommending CABG, undergoing AT eval    LV thrombus  - c/w heparin gtt    ===================== RENAL =========================  Non-oliguric ARIC, resolved   - Baseline Cr: 1-  - Renal US: no evidence of renal artery stenosis  - Trend BMP, lytes daily, replace as needed  - Continue Strict I/Os, avoid nephrotoxins    Mild Hyponatremia/Hyperkalemia iso ARIC  - Continue fluid restriction   - Urea powder d/c'd per renal  - Trend daily  - Continue monitoring urine output, lytes, SCr/ BUN  - Replete lytes prn with goal K >4 and Mg >2    =============== GASTROINTESTINAL===================  Constipation/ileus, resolved  - regular diet  - bowel reg prn    ===================ENDO====================  Type 2 DM  - A1c 8.3  - Continue lantus, premeal, low ISS  - continue FS    ===================HEMATOLOGIC/ONC ===================  - H/H & plts stable  - Monitor H/H and plts  - VTE PPX: heparin gtt    ==================INFECTIOUS DISEASE================  # Positive blood culture, resolved  - Repeat BCx drawn  for leukocytosis to 12k - positive on , G+ rods in anaerobic bottle, suspect contaminant  - Repeat cultures x2 sent  per transplant ID recs - no growth to date  - Vancomycin by trough (-)  - Final microbial ID: Cutibacterium acnes, per ID this is likely to be a skin contaminant, vanc dc'd.   - Dental eval  to rule out infectious etiology that would have led to bacteremia, no significant findings on exam.     # Enterococcus faecalis bacteremia, resolved  - BCx + for enterococcus faecalis x2, Staph epi x1 (likely contaminant)- pan sensitive   - Urine cx  + enterococcus faecalis  - BCx  no growth   - IABP site swapped to RFA   - s/p Vancomycin 1g q12h (-)  - CT A/P negative for infectious pathology    # COVID, resolved  - Off airborne precautions     # Pre-transplant ID w/u   - Trend ID recs for serologies   - Colonoscopy  - normal   - Chest CT  - improved LLL aeration  - s/p immunizations    ==================INFECTIOUS DISEASE================  # Upper Extremity Rash  - Patient developed bilateral upper extremity rash after receiving Hepatitis A & B immunizations on   - Dermatology consulted  - not likely to be related to vanco, can continue per ID recs  - Recommend clobetasol 0.05% BID to affected areas   - RVP repeated to rule out viral etiology, negative        Patient requires continuous monitoring with bedside rhythm monitoring, pulse ox monitoring, and intermittent blood gas analysis. Care plan discussed with ICU care team. Patient remained critical and at risk for life threatening decompensation.  Patient seen, examined and plan discussed with CCU team during rounds.   I have personally provided 35 minutes of critical care time excluding time spent on separate procedures, in addition to initial critical care time provided by the CICU Attending, Dr. Lees  By signing my name below, I, Eleonora Marshall, attest that this documentation has been prepared under the direction and in the presence of KARYN Choi  Electronically signed: Rosalba Talavera, 23 @ 19:59  I, KARYN Choi , personally performed the services described in this documentation. all medical record entries made by the eileenibe were at my direction and in my presence. I have reviewed the chart and agree that the record reflects my personal performance and is accurate and complete  Electronically signed: KARYN Choi

## 2023-12-08 NOTE — PROGRESS NOTE ADULT - PROBLEM SELECTOR PLAN 1
-Listen UNOS status 2.   - L IABP at 1:1, placed 11/9. Exchange to LFA given high grade bacteremia on 11/20. On AC with Heparin infusion (also for LV thrombus)  - Continue Coreg 25 mg BID hold for MAP <65  - Continue HDZN 75 mg TID and ISDN 30 mg TID, hold for MAP <65  - Off romel given HyperK  -allow for more liberal PO liquid intake  - AT evaluation: Accepted for transplant, currently listed UNOS Status 2. ABO A. PRA 0% 11/13. Last Brilinta dose was on 11/13, However, downgraded to status 7 on 12/4 given + blood culture. But now re-upgraded to status 2.   -No further vancomycin perTransplant ID recs.   - Please keep primary Dr. Banuelos [phone] in the loop per family request. -Listen UNOS status 2.   - L IABP at 1:1, placed 11/9. Exchange to LFA given high grade bacteremia on 11/20. On AC with Heparin infusion (also for LV thrombus)  - Continue Coreg 25 mg BID hold for MAP <65  - Continue HDZN 75 mg TID and ISDN 30 mg TID, hold for MAP <65  - Off romel given HyperK  -allow for more liberal PO liquid intake  - AT evaluation: Accepted for transplant, currently listed UNOS Status 2. ABO A. PRA 0% 11/13. Last Brilinta dose was on 11/13, However, downgraded to status 7 on 12/4 given + blood culture. But now re-upgraded to status 2.   -No further vancomycin perTransplant ID recs.   - Please keep primary Dr. Banuelos [phone] in the loop per family request.

## 2023-12-08 NOTE — PROGRESS NOTE ADULT - ASSESSMENT
59 yo M with PMHx of HTN, CAD w/ 1 stent in 2009, ICH (2008) presented on 11/1 with abn ekg. Patient presented to [facility] where he was found to have STEMI, recommended to get cath however patient did not want to get it there so he left and came here.   EKG here with ST depression in lateral leads and elevation in anterior leads   Prior to [facility] found to be tachycardic, dyspneic, intubated   C 11/1 with chronic total occlusion of LAD and RCA, with elevated RA and PA pressures  TTE 11/1 with severely decreased EF 32%, s/p IABP 11/1    RVP (11/1) Positive for COVID19  RVP (11/10) Negative  BCx (11/2, 11/4) NGTD  ETT Culture (11/2) NGTD  MRSA/MSSA PCR (11/2, 11/10) Negative  UA (11/10) 1 WBC    CXR (11/10) Clear Lungs  TTE (11/2) Left ventricular thrombus.    #Positive Blood Cultures (11/30 - Cutibacterium)  Growth this far from procurement raises question of contaminant  Given ID as Cutibacterium skin procurement contaminant is favored  Repeat blood cultures with NGTD  --Hold off on further Vancomycin  --No absolute ID contraindication to re-activate for transplant.   --Continue to follow CBC with diff  --Continue to follow temperature curve    #Rash  ?Secondary to Cefepime on 11/2?  ?Secondary to hepatitis vaccine?  ?Secondary to Vancomycin  --Appreciate Dermatology input  --Follow LFT's and CBC with Diff (For Eosinophil Counts)    #Enterococcus Bacteremia, Leukocytosis, Pre-Heart Transplant Evaluation Serologies  Concern for either central line or urinary source  CT A/P Noncontrast (11/18) No acute process  s/p ten-days of IV Vancomycin last dose received on 11/27    #Encounter to Vaccinate Patient  COVID19: COVID19 Infection This Admission. Would consider Vaccination in ~3 months  Influenza: declined  Pneumococcal: Recommend PCV20  HAV: received first dose 11/24  HBV: received first dose Heplisav 11/24  MMR: Not Mumps Immune, if >1 month until transplant would consider MMR dose  Varicella: Immune  Shingles: recommend Shingrix series  Tdap: received Tdap booster this admission    I will continue to follow. Please feel free to contact me with any further questions.    Silviano Manuel M.D.  [facility] Division of Infectious Disease  8AM-5PM Monday - Friday: Available on [employer] Teams  After Hours and Holidays (or if no response on Microsoft Teams): Please contact the Infectious Diseases Office at [phone] 59 yo M with PMHx of HTN, CAD w/ 1 stent in 2009, ICH (2008) presented on 11/1 with abn ekg. Patient presented to [facility] where he was found to have STEMI, recommended to get cath however patient did not want to get it there so he left and came here.   EKG here with ST depression in lateral leads and elevation in anterior leads   Prior to [facility] found to be tachycardic, dyspneic, intubated   C 11/1 with chronic total occlusion of LAD and RCA, with elevated RA and PA pressures  TTE 11/1 with severely decreased EF 32%, s/p IABP 11/1    RVP (11/1) Positive for COVID19  RVP (11/10) Negative  BCx (11/2, 11/4) NGTD  ETT Culture (11/2) NGTD  MRSA/MSSA PCR (11/2, 11/10) Negative  UA (11/10) 1 WBC    CXR (11/10) Clear Lungs  TTE (11/2) Left ventricular thrombus.    #Positive Blood Cultures (11/30 - Cutibacterium)  Growth this far from procurement raises question of contaminant  Given ID as Cutibacterium skin procurement contaminant is favored  Repeat blood cultures with NGTD  --Hold off on further Vancomycin  --No absolute ID contraindication to re-activate for transplant.   --Continue to follow CBC with diff  --Continue to follow temperature curve    #Rash  ?Secondary to Cefepime on 11/2?  ?Secondary to hepatitis vaccine?  ?Secondary to Vancomycin  --Appreciate Dermatology input  --Follow LFT's and CBC with Diff (For Eosinophil Counts)    #Enterococcus Bacteremia, Leukocytosis, Pre-Heart Transplant Evaluation Serologies  Concern for either central line or urinary source  CT A/P Noncontrast (11/18) No acute process  s/p ten-days of IV Vancomycin last dose received on 11/27    #Encounter to Vaccinate Patient  COVID19: COVID19 Infection This Admission. Would consider Vaccination in ~3 months  Influenza: declined  Pneumococcal: Recommend PCV20  HAV: received first dose 11/24  HBV: received first dose Heplisav 11/24  MMR: Not Mumps Immune, if >1 month until transplant would consider MMR dose  Varicella: Immune  Shingles: recommend Shingrix series  Tdap: received Tdap booster this admission    I will continue to follow. Please feel free to contact me with any further questions.    Silviano Manuel M.D.  [facility] Division of Infectious Disease  8AM-5PM Monday - Friday: Available on [employer] Teams  After Hours and Holidays (or if no response on Microsoft Teams): Please contact the Infectious Diseases Office at [phone] 57 yo M with PMHx of HTN, CAD w/ 1 stent in 2009, ICH (2008) presented on 11/1 with abn ekg. Patient presented to [facility] where he was found to have STEMI, recommended to get cath however patient did not want to get it there so he left and came here.   EKG here with ST depression in lateral leads and elevation in anterior leads   Prior to [facility] found to be tachycardic, dyspneic, intubated   C 11/1 with chronic total occlusion of LAD and RCA, with elevated RA and PA pressures  TTE 11/1 with severely decreased EF 32%, s/p IABP 11/1    RVP (11/1) Positive for COVID19  RVP (11/10) Negative  BCx (11/2, 11/4) NGTD  ETT Culture (11/2) NGTD  MRSA/MSSA PCR (11/2, 11/10) Negative  UA (11/10) 1 WBC    CXR (11/10) Clear Lungs  TTE (11/2) Left ventricular thrombus.    #Positive Blood Cultures (11/30 - Cutibacterium)  Growth this far from procurement raises question of contaminant  Given ID as Cutibacterium skin procurement contaminant is favored  Repeat blood cultures with NGTD  --Hold off on further Vancomycin  --No absolute ID contraindication to re-activate for transplant.   --Continue to follow CBC with diff  --Continue to follow temperature curve    #Rash  ?Secondary to Cefepime on 11/2?  ?Secondary to hepatitis vaccine?  ?Secondary to Vancomycin  --Appreciate Dermatology input  --Follow LFT's and CBC with Diff (For Eosinophil Counts)    #Enterococcus Bacteremia, Leukocytosis, Pre-Heart Transplant Evaluation Serologies  Concern for either central line or urinary source  CT A/P Noncontrast (11/18) No acute process  s/p ten-days of IV Vancomycin last dose received on 11/27    #Encounter to Vaccinate Patient  COVID19: COVID19 Infection This Admission. Would consider Vaccination in ~3 months  Influenza: declined  Pneumococcal: Recommend PCV20  HAV: received first dose 11/24  HBV: received first dose Heplisav 11/24  MMR: Not Mumps Immune, if >1 month until transplant would consider MMR dose  Varicella: Immune  Shingles: recommend Shingrix series  Tdap: received Tdap booster this admission    I will be away starting tomorrow. Dr Brandi Silva will be covering tomorrow.     Silviano Manuel M.D.  [facility] Division of Infectious Disease  8AM-5PM Monday - Friday: Available on [employer] Teams  After Hours and Holidays (or if no response on Microsoft Teams): Please contact the Infectious Diseases Office at [phone]  57 yo M with PMHx of HTN, CAD w/ 1 stent in 2009, ICH (2008) presented on 11/1 with abn ekg. Patient presented to [facility] where he was found to have STEMI, recommended to get cath however patient did not want to get it there so he left and came here.   EKG here with ST depression in lateral leads and elevation in anterior leads   Prior to [facility] found to be tachycardic, dyspneic, intubated   C 11/1 with chronic total occlusion of LAD and RCA, with elevated RA and PA pressures  TTE 11/1 with severely decreased EF 32%, s/p IABP 11/1    RVP (11/1) Positive for COVID19  RVP (11/10) Negative  BCx (11/2, 11/4) NGTD  ETT Culture (11/2) NGTD  MRSA/MSSA PCR (11/2, 11/10) Negative  UA (11/10) 1 WBC    CXR (11/10) Clear Lungs  TTE (11/2) Left ventricular thrombus.    #Positive Blood Cultures (11/30 - Cutibacterium)  Growth this far from procurement raises question of contaminant  Given ID as Cutibacterium skin procurement contaminant is favored  Repeat blood cultures with NGTD  --Hold off on further Vancomycin  --No absolute ID contraindication to re-activate for transplant.   --Continue to follow CBC with diff  --Continue to follow temperature curve    #Rash  ?Secondary to Cefepime on 11/2?  ?Secondary to hepatitis vaccine?  ?Secondary to Vancomycin  --Appreciate Dermatology input  --Follow LFT's and CBC with Diff (For Eosinophil Counts)    #Enterococcus Bacteremia, Leukocytosis, Pre-Heart Transplant Evaluation Serologies  Concern for either central line or urinary source  CT A/P Noncontrast (11/18) No acute process  s/p ten-days of IV Vancomycin last dose received on 11/27    #Encounter to Vaccinate Patient  COVID19: COVID19 Infection This Admission. Would consider Vaccination in ~3 months  Influenza: declined  Pneumococcal: Recommend PCV20  HAV: received first dose 11/24  HBV: received first dose Heplisav 11/24  MMR: Not Mumps Immune, if >1 month until transplant would consider MMR dose  Varicella: Immune  Shingles: recommend Shingrix series  Tdap: received Tdap booster this admission    I will be away starting tomorrow. Dr Brandi Silva will be covering tomorrow.     Silviano Manuel M.D.  [facility] Division of Infectious Disease  8AM-5PM Monday - Friday: Available on [employer] Teams  After Hours and Holidays (or if no response on [employer] Teams): Please contact the Infectious Diseases Office at [phone]

## 2023-12-08 NOTE — PROGRESS NOTE ADULT - ATTENDING COMMENTS
Patient found in bed in [facility], he is supported by IABP 1:1 and hydralazine/ISDN for further afterload reduction, renal function improved with gentle hydration. LFTs trending down. He was reactivated as status 2 after negative Bcx and was cleared by transplant ID and dental. He reports feeling well but feels down today. He is ABO A, BMI 32, Ht 175; PRA 0% on 11/13.     Continue IABP support 1:1   Hydralazine/ISDN 75/30 mg BID   Carvedilol 25 mg BID   Monitor strict I/Os   Daily weight   Allow him to drink more orally to avoid pre-renal azotemia Patient found in bed in [facility], he is supported by IABP 1:1 and hydralazine/ISDN for further afterload reduction, renal function improved with gentle hydration. LFTs trending down. He was reactivated as status 2 after negative Bcx and was cleared by transplant ID and dental. He reports feeling well but feels down today. He is ABO A, BMI 32, Ht 175; PRA 0% on 11/13.     Continue IABP support 1:1   Hydralazine/ISDN 75/30 mg BID   Carvedilol 25 mg BID   Monitor strict I/Os   Daily weight   Allow him to drink more orally to avoid pre-renal azotemia

## 2023-12-08 NOTE — PROGRESS NOTE ADULT - ASSESSMENT
57 yo male h/o htn, cad s/p pci, ICH, here with NSTEMI  s/p intubation and cath. now in CCU    NSTEMI  s/p  cath  cath results noted. multi vessel dz., CTSx f/u.   hep gtt  pt with vtach/fib arrest again on 11/8. s/p ACLS and ROSC.   now extubated  IABP in place  mngt as per CCU  plan for transplant as per HF and transplant team  transplant w/u ongoing.   s/p colonoscopy 11/17. normal  now listed for transplant as of 11/22    LV thrombus   hep gtt    acute on chronic systolic heart failure  heart failure team f/u    pna  resolved     covid  resolved  now off isolation     h/o ICH  resolved    agitation  psy consult f/u    bacteremia  id following  iv abx  f/u repeat cult      vomiting and abd pain  ileus on CT  bowel regimen  gi f/u  +bm    now resolved.     IABP malposition on CT  mngt as per ccu. iabp adjusted    rash  possible drug reaction  derm f/u    mngt as per CCU team          Advanced care planning was discussed with patient and family.  Advanced care planning forms were reviewed and discussed as appropriate.  Differential diagnosis and plan of care discussed with patient after the evaluation.   Pain assessed and judicious use of narcotics when appropriate was discussed.  Importance of Fall prevention discussed.  Counseling on Smoking and Alcohol cessation was offered when appropriate.  Counseling on Diet, exercise, and medication compliance was done.       Approx 75 minutes spent. 59 yo male h/o htn, cad s/p pci, ICH, here with NSTEMI  s/p intubation and cath. now in CCU    NSTEMI  s/p  cath  cath results noted. multi vessel dz., CTSx f/u.   hep gtt  pt with vtach/fib arrest again on 11/8. s/p ACLS and ROSC.   now extubated  IABP in place  mngt as per CCU  plan for transplant as per HF and transplant team  transplant w/u ongoing.   s/p colonoscopy 11/17. normal  now listed for transplant as of 11/22    LV thrombus   hep gtt    acute on chronic systolic heart failure  heart failure team f/u    pna  resolved     covid  resolved  now off isolation     h/o ICH  resolved    agitation  psy consult f/u    bacteremia  id following  iv abx  f/u repeat cult      vomiting and abd pain  ileus on CT  bowel regimen  gi f/u  +bm    now resolved.     IABP malposition on CT  mngt as per ccu. iabp adjusted    rash  possible drug reaction  derm f/u    mngt as per CCU team          Advanced care planning was discussed with patient and family.  Advanced care planning forms were reviewed and discussed as appropriate.  Differential diagnosis and plan of care discussed with patient after the evaluation.   Pain assessed and judicious use of narcotics when appropriate was discussed.  Importance of Fall prevention discussed.  Counseling on Smoking and Alcohol cessation was offered when appropriate.  Counseling on Diet, exercise, and medication compliance was done.       Approx 75 minutes spent.

## 2023-12-08 NOTE — PROGRESS NOTE ADULT - SUBJECTIVE AND OBJECTIVE BOX
Patient seen and examined at bedside.    Overnight Events: No acute events overnight. Denies chest pain, palpitations, or shortness of breath.       REVIEW OF SYSTEMS:  Constitutional:     [ x] negative [ ] fevers [ ] chills [ ] weight loss [ ] weight gain  HEENT:                  [ x] negative [ ] dry eyes [ ] eye irritation [ ] postnasal drip [ ] nasal congestion  CV:                         [x ] negative  [ ] chest pain [ ] orthopnea [ ] palpitations [ ] murmur  Resp:                     [ x] negative [ ] cough [ ] shortness of breath [ ] dyspnea [ ] wheezing [ ] sputum [ ]hemoptysis  GI:                          [ x] negative [ ] nausea [ ] vomiting [ ] diarrhea [ ] constipation [ ] abd pain [ ] dysphagia   :                        [ x] negative [ ] dysuria [ ] nocturia [ ] hematuria [ ] increased urinary frequency  Musculoskeletal: [ x] negative [ ] back pain [ ] myalgias [ ] arthralgias [ ] fracture  Skin:                       [ x] negative [ ] rash [ ] itch  Neurological:        [ x] negative [ ] headache [ ] dizziness [ ] syncope [ ] weakness [ ] numbness  Psychiatric:           [ x] negative [ ] anxiety [ ] depression  Endocrine:            [ x] negative [ ] diabetes [ ] thyroid problem  Heme/Lymph:      [ x] negative [ ] anemia [ ] bleeding problem  Allergic/Immune: [x ] negative [ ] itchy eyes [ ] nasal discharge [ ] hives [ ] angioedema    [x ] All other systems negative  [ ] Unable to assess ROS due to    Current Meds:  aspirin  chewable 81 milliGRAM(s) Oral daily  atorvastatin 80 milliGRAM(s) Oral at bedtime  budesonide  80 MICROgram(s)/formoterol 4.5 MICROgram(s) Inhaler 2 Puff(s) Inhalation two times a day  carvedilol 25 milliGRAM(s) Oral every 12 hours  chlorhexidine 2% Cloths 1 Application(s) Topical daily  clobetasol 0.05% Ointment 1 Application(s) Topical two times a day  heparin  Infusion 1300 Unit(s)/Hr IV Continuous <Continuous>  hydrALAZINE 75 milliGRAM(s) Oral three times a day  hydrOXYzine hydrochloride 25 milliGRAM(s) Oral two times a day PRN  insulin glargine Injectable (LANTUS) 12 Unit(s) SubCutaneous at bedtime  insulin lispro (ADMELOG) corrective regimen sliding scale   SubCutaneous at bedtime  insulin lispro (ADMELOG) corrective regimen sliding scale   SubCutaneous three times a day before meals  insulin lispro Injectable (ADMELOG) 9 Unit(s) SubCutaneous three times a day before meals  isosorbide   dinitrate Tablet (ISORDIL) 30 milliGRAM(s) Oral three times a day  polyethylene glycol 3350 17 Gram(s) Oral two times a day  senna 2 Tablet(s) Oral at bedtime      PAST MEDICAL & SURGICAL HISTORY:  HTN (hypertension)      CAD (coronary artery disease)  2009; stent      Intracranial hemorrhage  2008      Respiratory arrest  december 1st      Myocardial infarction, unspecified MI type, unspecified artery      History of coronary artery stent placement          Vitals:  T(F): 98 (12-08), Max: 98.7 (12-08)  HR: 80 (12-08) (78 - 87)  BP: --  RR: 22 (12-08)  SpO2: 96% (12-08)  I&O's Summary    07 Dec 2023 07:01  -  08 Dec 2023 07:00  --------------------------------------------------------  IN: 966 mL / OUT: 1450 mL / NET: -484 mL    08 Dec 2023 07:01  -  08 Dec 2023 15:01  --------------------------------------------------------  IN: 338 mL / OUT: 600 mL / NET: -262 mL        Physical Exam:  Appearance: No acute distress; well appearing  Eyes: PERRL, EOMI, pink conjunctiva  HENT: Normal oral mucosa  Cardiovascular: RRR, S1, S2, IABP in place.   Respiratory: Clear to auscultation bilaterally  Gastrointestinal: soft, non-tender, non-distended with normal bowel sounds  Musculoskeletal: No clubbing; no joint deformity   Neurologic: Non-focal  Lymphatic: No lymphadenopathy  Psychiatry: AAOx3, mood & affect appropriate  Skin: erythematous rash on bilat UE.                          11.6   7.57  )-----------( 126      ( 08 Dec 2023 01:22 )             34.9     12-08    134<L>  |  103  |  36<H>  ----------------------------<  215<H>  4.3   |  21<L>  |  1.21    Ca    9.9      08 Dec 2023 01:23  Phos  3.2     12-08  Mg     1.7     12-08    TPro  6.8  /  Alb  4.0  /  TBili  0.2  /  DBili  x   /  AST  40  /  ALT  110<H>  /  AlkPhos  60  12-08    PT/INR - ( 08 Dec 2023 01:22 )   PT: 11.3 sec;   INR: 1.03 ratio         PTT - ( 08 Dec 2023 01:22 )  PTT:65.7 sec

## 2023-12-08 NOTE — PROGRESS NOTE ADULT - ASSESSMENT
58 male with HTN, CAD (s/p PCI 2008), HFrEF, CVA 2018, and T2DM presenting with chest pressure and unknown tachycardia that was shocked x1, C 11/1 found to have in-stent restenosis of pLAD and  of RCA with elevated RA and PA pressures and severely decreased. Admitted to CICU for management of cardiogenic shock and ADHF requiring IABP 11/1 -11/7, with hospital course c/b vfib arrest requiring reinsertion of IABP. Not recommended for ATs per HF. Currently listed for transplant status 2.    Plan:  ====================== NEUROLOGY=====================  Anxiety  - No Seroquel/antipsychotics since vfib arrest and prolonged QTC  - Psych Eval, recommended SSRI, but pt. refused   - Psych recommending atarax PRN  - Continue to monitor mental status    PT/Conditioning  - Continue band exercised while on bedrest s/t IABP    ==================== RESPIRATORY======================  Acute Hypoxemic Respiratory Failure  - s/p x2 intubations for cardiogenic pulm edema and the in setting of cardiac arrest, resolved - extubated 11/10  - Currently maintaining >95% sats on room air  - Continue incentive spirometry and monitoring of sp02    Asthma  - c/w albuterol, symbicort and spiriva  - On trelegy at home  - Continue to monitor SpO2 with goal >94%    ====================CARDIOVASCULAR==================  Vfib arrest i/s/o ischemia  - Lido gtt off 11/13  - PO Amio load - total of 5g per EP complete 11/17, continue PO amio  - Amio held for rising LFTs, continue to hold  - Keep K > 4, Mag > 2.2     Cardiogenic shock requiring IABP (11/1- 11/7, 11/9-)  - Likely 2/2 NSTEMI and ADHF  - 11/1 LH: pLAD 100 % in-stent restenosis & mRCA, 100 %. PCWP 30. IABP placed.  - 11/1 TTE: LV dilated. EF 32 %. Regional WMAs present, mod (grade 2) LV diastolic dysfunction  - 11/2 TTE: EF 22% and + LV thrombus  - IABP swapped 11/20 to RFA, continue 1:1 support  - Off Milrinone gtt @ 7:30 am 11/11  - [place] d/c'd due to elevated K levels  - c/w coreg 25 BID for GDMT  - HIT and HAIDER sent (12/3) as per HF   - UNOS status 2 as of 12/6  - 12/5 - reduced hydralazine 100 TID to 75 TID and ISD 40 TID to 30 TID for AL reduction    NSTEMI iso stent re-occlusion of pLAD and 100%  of RCA  - EKG on admission w/ LBBB  - DAPT: c/w ASA, Brilinta d/c'd per transplant w/u  - c/w lipitor 80  - cMR deferred given necessity of IABP  - CT sx not recommending CABG, undergoing AT eval    LV thrombus  - c/w heparin gtt    ===================== RENAL =========================  Non-oliguric ARIC, resolved   - Baseline Cr: 1-1.22  - Renal US: no evidence of renal artery stenosis  - Trend BMP, lytes daily, replace as needed  - Continue Strict I/Os, avoid nephrotoxins    Mild Hyponatremia/Hyperkalemia iso ARIC  - Continue fluid restriction   - Urea powder d/c'd per renal  - Trend daily  - Continue monitoring urine output, lytes, SCr/ BUN  - Replete lytes prn with goal K >4 and Mg >2    =============== GASTROINTESTINAL===================  Constipation/ileus, resolved  - regular diet  - bowel reg prn    ===================ENDO====================  Type 2 DM  - A1c 8.3  - Continue lantus, premeal, low ISS  - continue FS    ===================HEMATOLOGIC/ONC ===================  - H/H & plts stable  - Monitor H/H and plts  - VTE PPX: heparin gtt    ==================INFECTIOUS DISEASE================  # Positive blood culture, resolved  - Repeat BCx drawn 11/30 for leukocytosis to 12k - positive on 12/4, G+ rods in anaerobic bottle, suspect contaminant  - Repeat cultures x2 sent 12/4 per transplant ID recs - no growth to date  - Vancomycin by trough (12/4-12/6)  - Final microbial ID: Cutibacterium acnes, per ID this is likely to be a contaminant, vanc dc'd.   - HF recommending dental consult to rule out potential nidus, patient has no HEENT complaints at this time    # Enterococcus faecalis bacteremia, resolved  - BCx 11/17+ for enterococcus faecalis x2, Staph epi x1 (likely contaminant)- pan sensitive   - Urine cx 11/18 + enterococcus faecalis  - BCx 11/18 no growth   - IABP site swapped to RFA 11/20  - s/p Vancomycin 1g q12h (11/18-11/27)  - CT A/P negative for infectious pathology    # COVID, resolved  - Off airborne precautions 11/11    # Pre-transplant ID w/u   - Trend ID recs for serologies   - Colonoscopy 11/17 - normal   - Chest CT 11/17 - improved LLL aeration  - s/p immunizations    ==================INFECTIOUS DISEASE================  # Upper Extremity Rash  - Patient developed bilateral upper extremity rash after receiving Hepatitis A & B immunizations on 11/24  - Dermatology consulted 12/5 - not likely to be related to vanco, can continue per ID recs  - Recommend clobetasol 0.05% BID to affected areas, patient refusing topical therapies at this time  - RVP repeated to rule out viral etiology, negative   58 male with HTN, CAD (s/p PCI 2008), HFrEF, CVA 2018, and T2DM presenting with chest pressure and unknown tachycardia that was shocked x1, C 11/1 found to have in-stent restenosis of pLAD and  of RCA with elevated RA and PA pressures and severely decreased. Admitted to CICU for management of cardiogenic shock and ADHF requiring IABP 11/1 -11/7, with hospital course c/b vfib arrest requiring reinsertion of IABP. Not recommended for ATs per HF. Currently listed for transplant status 2.    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 AT evaluation launched 11/10, he is ABO A. He was discussed during TSC on 11/16 and was approved for listing, however was found to be Bacteremic with 11/17 Blc Cx growing mixed cultures Staph epi and Enterococcus faecalis and started on IV Vanco. Repeat cultures from 11/18 reveal NGTD and therefore was cleared by ID for listing.     He appears adequately supported on IABP at 1:1, electrically quiescent on oral Amiodarone. Cr is improving with holding diuretics. Labs otherwise notable for worsening thrombocytopenia. Awaiting suitable donor. Now with GM + rods in blood culture on 11/30 (reported on 12/4), restarted on vancomycin, and status 7, repeat cultures now negative x48 hours (12/6),  now status 2 again.       ====================== NEUROLOGY=====================  Anxiety  - No Seroquel/antipsychotics since vfib arrest and prolonged QTC  - Psych Eval, recommended SSRI, but pt. refused   - Psych recommending atarax PRN  - Continue to monitor mental status    PT/Conditioning  - Continue band exercised while on bedrest s/t IABP    ==================== RESPIRATORY======================  Acute Hypoxemic Respiratory Failure  - s/p x2 intubations for cardiogenic pulm edema and the in setting of cardiac arrest, resolved - extubated 11/10  - Currently maintaining >95% sats on room air  - Continue incentive spirometry and monitoring of sp02    Asthma  - c/w albuterol, symbicort and spiriva  - On trelegy at home  - Continue to monitor SpO2 with goal >94%    ====================CARDIOVASCULAR==================  Vfib arrest i/s/o ischemia  - Lido gtt off 11/13  - PO Amio load - total of 5g per EP complete 11/17, continue PO amio  - Amio held for rising LFTs, continue to hold  - Keep K > 4, Mag > 2.2     Cardiogenic shock requiring IABP (11/1- 11/7, 11/9-)  - Likely 2/2 NSTEMI and ADHF  - 11/1 LHC: pLAD 100 % in-stent restenosis & mRCA, 100 %. PCWP 30. IABP placed.  - 11/1 TTE: LV dilated. EF 32 %. Regional WMAs present, mod (grade 2) LV diastolic dysfunction  - 11/2 TTE: EF 22% and + LV thrombus  - IABP swapped 11/20 to RFA, continue 1:1 support  - Off Milrinone gtt @ 7:30 am 11/11  - [place] d/c'd due to elevated K levels  - c/w coreg 25 BID for GDMT  - HIT and HAIDER sent (12/3) as per HF   - UNOS status 2 as of 12/6  - 12/5 - reduced hydralazine 100 TID to 75 TID and ISD 40 TID to 30 TID for AL reduction    NSTEMI iso stent re-occlusion of pLAD and 100%  of RCA  - EKG on admission w/ LBBB  - DAPT: c/w ASA, Brilinta d/c'd per transplant w/u  - c/w lipitor 80  - cMR deferred given necessity of IABP  - CT sx not recommending CABG, undergoing AT eval    LV thrombus  - c/w heparin gtt    ===================== RENAL =========================  Non-oliguric ARIC, resolved   - Baseline Cr: 1-1.22  - Renal US: no evidence of renal artery stenosis  - Trend BMP, lytes daily, replace as needed  - Continue Strict I/Os, avoid nephrotoxins    Mild Hyponatremia/Hyperkalemia iso ARIC  - Continue fluid restriction   - Urea powder d/c'd per renal  - Trend daily  - Continue monitoring urine output, lytes, SCr/ BUN  - Replete lytes prn with goal K >4 and Mg >2    =============== GASTROINTESTINAL===================  Constipation/ileus, resolved  - regular diet  - bowel reg prn    ===================ENDO====================  Type 2 DM  - A1c 8.3  - Continue lantus, premeal, low ISS  - continue FS    ===================HEMATOLOGIC/ONC ===================  - H/H & plts stable  - Monitor H/H and plts  - VTE PPX: heparin gtt    ==================INFECTIOUS DISEASE================  # Positive blood culture, resolved  - Repeat BCx drawn 11/30 for leukocytosis to 12k - positive on 12/4, G+ rods in anaerobic bottle, suspect contaminant  - Repeat cultures x2 sent 12/4 per transplant ID recs - no growth to date  - Vancomycin by trough (12/4-12/6)  - Final microbial ID: Cutibacterium acnes, per ID this is likely to be a skin contaminant, vanc dc'd.   - Dental eval 12/7 to rule out infectious etiology that would have led to bacteremia, no significant findings on exam.     # Enterococcus faecalis bacteremia, resolved  - BCx 11/17+ for enterococcus faecalis x2, Staph epi x1 (likely contaminant)- pan sensitive   - Urine cx 11/18 + enterococcus faecalis  - BCx 11/18 no growth   - IABP site swapped to RFA 11/20  - s/p Vancomycin 1g q12h (11/18-11/27)  - CT A/P negative for infectious pathology    # COVID, resolved  - Off airborne precautions 11/11    # Pre-transplant ID w/u   - Trend ID recs for serologies   - Colonoscopy 11/17 - normal   - Chest CT 11/17 - improved LLL aeration  - s/p immunizations    ==================INFECTIOUS DISEASE================  # Upper Extremity Rash  - Patient developed bilateral upper extremity rash after receiving Hepatitis A & B immunizations on 11/24  - Dermatology consulted 12/5 - not likely to be related to vanco, can continue per ID recs  - Recommend clobetasol 0.05% BID to affected areas, patient refusing topical therapies at this time  - RVP repeated to rule out viral etiology, negative   58 male with HTN, CAD (s/p PCI 2008), HFrEF, CVA 2018, and T2DM presenting with chest pressure and unknown tachycardia that was shocked x1, C 11/1 found to have in-stent restenosis of pLAD and  of RCA with elevated RA and PA pressures and severely decreased. Admitted to CICU for management of cardiogenic shock and ADHF requiring IABP 11/1 -11/7, with hospital course c/b vfib arrest requiring reinsertion of IABP. Not recommended for ATs per HF. Currently listed for transplant status 2.    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 AT evaluation launched 11/10, he is ABO A. He was discussed during TSC on 11/16 and was approved for listing, however was found to be Bacteremic with 11/17 Blc Cx growing mixed cultures Staph epi and Enterococcus faecalis and started on IV Vanco. Repeat cultures from 11/18 reveal NGTD and therefore was cleared by ID for listing.     He appears adequately supported on IABP at 1:1, electrically quiescent on oral Amiodarone. Cr is improving with holding diuretics. Labs otherwise notable for worsening thrombocytopenia. Awaiting suitable donor. Now with GM + rods in blood culture on 11/30 (reported on 12/4), restarted on vancomycin, and status 7, repeat cultures now negative x48 hours (12/6),  now status 2 again.       ====================== NEUROLOGY=====================  Anxiety  - No Seroquel/antipsychotics since vfib arrest and prolonged QTC  - Psych Eval, recommended SSRI, but pt. refused   - Psych recommending atarax PRN  - Continue to monitor mental status    PT/Conditioning  - Continue band exercised while on bedrest s/t IABP    ==================== RESPIRATORY======================  Acute Hypoxemic Respiratory Failure  - s/p x2 intubations for cardiogenic pulm edema and the in setting of cardiac arrest, resolved - extubated 11/10  - Currently maintaining >95% sats on room air  - Continue incentive spirometry and monitoring of sp02    Asthma  - c/w albuterol, symbicort and spiriva  - On trelegy at home  - Continue to monitor SpO2 with goal >94%    ====================CARDIOVASCULAR==================  Vfib arrest i/s/o ischemia  - Lido gtt off 11/13  - PO Amio load - total of 5g per EP complete 11/17, continue PO amio  - Amio held for rising LFTs, continue to hold  - Keep K > 4, Mag > 2.2     Cardiogenic shock requiring IABP (11/1- 11/7, 11/9-)  - Likely 2/2 NSTEMI and ADHF  - 11/1 LHC: pLAD 100 % in-stent restenosis & mRCA, 100 %. PCWP 30. IABP placed.  - 11/1 TTE: LV dilated. EF 32 %. Regional WMAs present, mod (grade 2) LV diastolic dysfunction  - 11/2 TTE: EF 22% and + LV thrombus  - IABP swapped 11/20 to RFA, continue 1:1 support  - Off Milrinone gtt @ 7:30 am 11/11  - [place] d/c'd due to elevated K levels  - c/w coreg 25 BID for GDMT  - HIT and HAIDER sent (12/3) as per HF   - UNOS status 2 as of 12/6  - 12/5 - reduced hydralazine 100 TID to 75 TID and ISD 40 TID to 30 TID for AL reduction    NSTEMI iso stent re-occlusion of pLAD and 100%  of RCA  - EKG on admission w/ LBBB  - DAPT: c/w ASA, Brilinta d/c'd per transplant w/u  - c/w lipitor 80  - cMR deferred given necessity of IABP  - CT sx not recommending CABG, undergoing AT eval    LV thrombus  - c/w heparin gtt    ===================== RENAL =========================  Non-oliguric ARIC, resolved   - Baseline Cr: 1-1.22  - Renal US: no evidence of renal artery stenosis  - Trend BMP, lytes daily, replace as needed  - Continue Strict I/Os, avoid nephrotoxins    Mild Hyponatremia/Hyperkalemia iso ARIC  - Continue fluid restriction   - Urea powder d/c'd per renal  - Trend daily  - Continue monitoring urine output, lytes, SCr/ BUN  - Replete lytes prn with goal K >4 and Mg >2    =============== GASTROINTESTINAL===================  Constipation/ileus, resolved  - regular diet  - bowel reg prn    ===================ENDO====================  Type 2 DM  - A1c 8.3  - Continue lantus, premeal, low ISS  - continue FS    ===================HEMATOLOGIC/ONC ===================  - H/H & plts stable  - Monitor H/H and plts  - VTE PPX: heparin gtt    ==================INFECTIOUS DISEASE================  # Positive blood culture, resolved  - Repeat BCx drawn 11/30 for leukocytosis to 12k - positive on 12/4, G+ rods in anaerobic bottle, suspect contaminant  - Repeat cultures x2 sent 12/4 per transplant ID recs - no growth to date  - Vancomycin by trough (12/4-12/6)  - Final microbial ID: Cutibacterium acnes, per ID this is likely to be a skin contaminant, vanc dc'd.   - Dental eval 12/7 to rule out infectious etiology that would have led to bacteremia, no significant findings on exam.     # Enterococcus faecalis bacteremia, resolved  - BCx 11/17+ for enterococcus faecalis x2, Staph epi x1 (likely contaminant)- pan sensitive   - Urine cx 11/18 + enterococcus faecalis  - BCx 11/18 no growth   - IABP site swapped to RFA 11/20  - s/p Vancomycin 1g q12h (11/18-11/27)  - CT A/P negative for infectious pathology    # COVID, resolved  - Off airborne precautions 11/11    # Pre-transplant ID w/u   - Trend ID recs for serologies   - Colonoscopy 11/17 - normal   - Chest CT 11/17 - improved LLL aeration  - s/p immunizations    ==================INFECTIOUS DISEASE================  # Upper Extremity Rash  - Patient developed bilateral upper extremity rash after receiving Hepatitis A & B immunizations on 11/24  - Dermatology consulted 12/5 - not likely to be related to vanco, can continue per ID recs  - Recommend clobetasol 0.05% BID to affected areas, patient refusing topical therapies at this time  - RVP repeated to rule out viral etiology, negative   58 male with HTN, CAD (s/p PCI 2008), HFrEF, CVA 2018, and T2DM presenting with chest pressure and unknown tachycardia that was shocked x1, C 11/1 found to have in-stent restenosis of pLAD and  of RCA with elevated RA and PA pressures and severely decreased. Admitted to CICU for management of cardiogenic shock and ADHF requiring IABP 11/1 -11/7, with hospital course c/b vfib arrest requiring reinsertion of IABP. Not recommended for ATs per HF. Currently listed for transplant status 2.    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 AT evaluation launched 11/10, he is ABO A. He was discussed during TSC on 11/16 and was approved for listing, however was found to be Bacteremic with 11/17 Blc Cx growing mixed cultures Staph epi and Enterococcus faecalis and started on IV Vanco. Repeat cultures from 11/18 reveal NGTD and therefore was cleared by ID for listing.     He appears adequately supported on IABP at 1:1, electrically quiescent on oral Amiodarone. Cr is improving with holding diuretics. Labs otherwise notable for worsening thrombocytopenia. Awaiting suitable donor. Now with GM + rods in blood culture on 11/30 (reported on 12/4), restarted on vancomycin, and status 7, repeat cultures now negative x48 hours (12/6),  now status 2 again.       ====================== NEUROLOGY=====================  Anxiety  - No Seroquel/antipsychotics since vfib arrest and prolonged QTC  - Psych Eval, recommended SSRI, but pt. refused   - Psych recommending atarax PRN  - Continue to monitor mental status    PT/Conditioning  - Continue band exercised while on bedrest s/t IABP    ==================== RESPIRATORY======================  Acute Hypoxemic Respiratory Failure  - s/p x2 intubations for cardiogenic pulm edema and the in setting of cardiac arrest, resolved - extubated 11/10  - Currently maintaining >95% sats on room air  - Continue incentive spirometry and monitoring of sp02    Asthma  - c/w albuterol, symbicort and spiriva  - On trelegy at home  - Continue to monitor SpO2 with goal >94%    ====================CARDIOVASCULAR==================  Vfib arrest i/s/o ischemia  - Lido gtt off 11/13  - PO Amio load - total of 5g per EP complete 11/17, continue PO amio  - Amio held for rising LFTs, continue to hold  - Keep K > 4, Mag > 2.2     Cardiogenic shock requiring IABP (11/1- 11/7, 11/9-)  - Likely 2/2 NSTEMI and ADHF  - 11/1 LHC: pLAD 100 % in-stent restenosis & mRCA, 100 %. PCWP 30. IABP placed.  - 11/1 TTE: LV dilated. EF 32 %. Regional WMAs present, mod (grade 2) LV diastolic dysfunction  - 11/2 TTE: EF 22% and + LV thrombus  - IABP swapped 11/20 to RFA, continue 1:1 support  - Off Milrinone gtt @ 7:30 am 11/11  - [place] d/c'd due to elevated K levels  - c/w coreg 25 BID for GDMT  - HIT and HAIDER sent (12/3) as per HF   - UNOS status 2 as of 12/6  - 12/5 - reduced hydralazine 100 TID to 75 TID and ISD 40 TID to 30 TID for AL reduction    NSTEMI iso stent re-occlusion of pLAD and 100%  of RCA  - EKG on admission w/ LBBB  - DAPT: c/w ASA, Brilinta d/c'd per transplant w/u  - c/w lipitor 80  - cMR deferred given necessity of IABP  - CT sx not recommending CABG, undergoing AT eval    LV thrombus  - c/w heparin gtt    ===================== RENAL =========================  Non-oliguric ARIC, resolved   - Baseline Cr: 1-1.22  - Renal US: no evidence of renal artery stenosis  - Trend BMP, lytes daily, replace as needed  - Continue Strict I/Os, avoid nephrotoxins    Mild Hyponatremia/Hyperkalemia iso ARIC  - Continue fluid restriction   - Urea powder d/c'd per renal  - Trend daily  - Continue monitoring urine output, lytes, SCr/ BUN  - Replete lytes prn with goal K >4 and Mg >2    =============== GASTROINTESTINAL===================  Constipation/ileus, resolved  - regular diet  - bowel reg prn    ===================ENDO====================  Type 2 DM  - A1c 8.3  - Continue lantus, premeal, low ISS  - continue FS    ===================HEMATOLOGIC/ONC ===================  - H/H & plts stable  - Monitor H/H and plts  - VTE PPX: heparin gtt    ==================INFECTIOUS DISEASE================  # Positive blood culture, resolved  - Repeat BCx drawn 11/30 for leukocytosis to 12k - positive on 12/4, G+ rods in anaerobic bottle, suspect contaminant  - Repeat cultures x2 sent 12/4 per transplant ID recs - no growth to date  - Vancomycin by trough (12/4-12/6)  - Final microbial ID: Cutibacterium acnes, per ID this is likely to be a skin contaminant, vanc dc'd.   - Dental eval 12/7 to rule out infectious etiology that would have led to bacteremia, no significant findings on exam.     # Enterococcus faecalis bacteremia, resolved  - BCx 11/17+ for enterococcus faecalis x2, Staph epi x1 (likely contaminant)- pan sensitive   - Urine cx 11/18 + enterococcus faecalis  - BCx 11/18 no growth   - IABP site swapped to RFA 11/20  - s/p Vancomycin 1g q12h (11/18-11/27)  - CT A/P negative for infectious pathology    # COVID, resolved  - Off airborne precautions 11/11    # Pre-transplant ID w/u   - Trend ID recs for serologies   - Colonoscopy 11/17 - normal   - Chest CT 11/17 - improved LLL aeration  - s/p immunizations    ==================INFECTIOUS DISEASE================  # Upper Extremity Rash  - Patient developed bilateral upper extremity rash after receiving Hepatitis A & B immunizations on 11/24  - Dermatology consulted 12/5 - not likely to be related to vanco, can continue per ID recs  - Recommend clobetasol 0.05% BID to affected areas   - RVP repeated to rule out viral etiology, negative   58 male with HTN, CAD (s/p PCI 2008), HFrEF, CVA 2018, and T2DM presenting with chest pressure and unknown tachycardia that was shocked x1, C 11/1 found to have in-stent restenosis of pLAD and  of RCA with elevated RA and PA pressures and severely decreased. Admitted to CICU for management of cardiogenic shock and ADHF requiring IABP 11/1 -11/7, with hospital course c/b vfib arrest requiring reinsertion of IABP. Not recommended for ATs per HF. Currently listed for transplant status 2.    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 AT evaluation launched 11/10, he is ABO A. He was discussed during TSC on 11/16 and was approved for listing, however was found to be Bacteremic with 11/17 Blc Cx growing mixed cultures Staph epi and Enterococcus faecalis and started on IV Vanco. Repeat cultures from 11/18 reveal NGTD and therefore was cleared by ID for listing.     He appears adequately supported on IABP at 1:1, electrically quiescent on oral Amiodarone. Cr is improving with holding diuretics. Labs otherwise notable for worsening thrombocytopenia. Awaiting suitable donor. Now with GM + rods in blood culture on 11/30 (reported on 12/4), restarted on vancomycin, and status 7, repeat cultures now negative x48 hours (12/6),  now status 2 again.       ====================== NEUROLOGY=====================  Anxiety  - No Seroquel/antipsychotics since vfib arrest and prolonged QTC  - Psych Eval, recommended SSRI, but pt. refused   - Psych recommending atarax PRN  - Continue to monitor mental status    PT/Conditioning  - Continue band exercised while on bedrest s/t IABP    ==================== RESPIRATORY======================  Acute Hypoxemic Respiratory Failure  - s/p x2 intubations for cardiogenic pulm edema and the in setting of cardiac arrest, resolved - extubated 11/10  - Currently maintaining >95% sats on room air  - Continue incentive spirometry and monitoring of sp02    Asthma  - c/w albuterol, symbicort and spiriva  - On trelegy at home  - Continue to monitor SpO2 with goal >94%    ====================CARDIOVASCULAR==================  Vfib arrest i/s/o ischemia  - Lido gtt off 11/13  - PO Amio load - total of 5g per EP complete 11/17, continue PO amio  - Amio held for rising LFTs, continue to hold  - Keep K > 4, Mag > 2.2     Cardiogenic shock requiring IABP (11/1- 11/7, 11/9-)  - Likely 2/2 NSTEMI and ADHF  - 11/1 LHC: pLAD 100 % in-stent restenosis & mRCA, 100 %. PCWP 30. IABP placed.  - 11/1 TTE: LV dilated. EF 32 %. Regional WMAs present, mod (grade 2) LV diastolic dysfunction  - 11/2 TTE: EF 22% and + LV thrombus  - IABP swapped 11/20 to RFA, continue 1:1 support  - Off Milrinone gtt @ 7:30 am 11/11  - Kinde d/c'd due to elevated K levels  - c/w coreg 25 BID for GDMT  - HIT and HAIDER sent (12/3) as per HF   - UNOS status 2 as of 12/6  - 12/5 - reduced hydralazine 100 TID to 75 TID and ISD 40 TID to 30 TID for AL reduction    NSTEMI iso stent re-occlusion of pLAD and 100%  of RCA  - EKG on admission w/ LBBB  - DAPT: c/w ASA, Brilinta d/c'd per transplant w/u  - c/w lipitor 80  - cMR deferred given necessity of IABP  - CT sx not recommending CABG, undergoing AT eval    LV thrombus  - c/w heparin gtt    ===================== RENAL =========================  Non-oliguric ARIC, resolved   - Baseline Cr: 1-1.22  - Renal US: no evidence of renal artery stenosis  - Trend BMP, lytes daily, replace as needed  - Continue Strict I/Os, avoid nephrotoxins    Mild Hyponatremia/Hyperkalemia iso ARIC  - Continue fluid restriction   - Urea powder d/c'd per renal  - Trend daily  - Continue monitoring urine output, lytes, SCr/ BUN  - Replete lytes prn with goal K >4 and Mg >2    =============== GASTROINTESTINAL===================  Constipation/ileus, resolved  - regular diet  - bowel reg prn    ===================ENDO====================  Type 2 DM  - A1c 8.3  - Continue lantus, premeal, low ISS  - continue FS    ===================HEMATOLOGIC/ONC ===================  - H/H & plts stable  - Monitor H/H and plts  - VTE PPX: heparin gtt    ==================INFECTIOUS DISEASE================  # Positive blood culture, resolved  - Repeat BCx drawn 11/30 for leukocytosis to 12k - positive on 12/4, G+ rods in anaerobic bottle, suspect contaminant  - Repeat cultures x2 sent 12/4 per transplant ID recs - no growth to date  - Vancomycin by trough (12/4-12/6)  - Final microbial ID: Cutibacterium acnes, per ID this is likely to be a skin contaminant, vanc dc'd.   - Dental eval 12/7 to rule out infectious etiology that would have led to bacteremia, no significant findings on exam.     # Enterococcus faecalis bacteremia, resolved  - BCx 11/17+ for enterococcus faecalis x2, Staph epi x1 (likely contaminant)- pan sensitive   - Urine cx 11/18 + enterococcus faecalis  - BCx 11/18 no growth   - IABP site swapped to RFA 11/20  - s/p Vancomycin 1g q12h (11/18-11/27)  - CT A/P negative for infectious pathology    # COVID, resolved  - Off airborne precautions 11/11    # Pre-transplant ID w/u   - Trend ID recs for serologies   - Colonoscopy 11/17 - normal   - Chest CT 11/17 - improved LLL aeration  - s/p immunizations    ==================INFECTIOUS DISEASE================  # Upper Extremity Rash  - Patient developed bilateral upper extremity rash after receiving Hepatitis A & B immunizations on 11/24  - Dermatology consulted 12/5 - not likely to be related to vanco, can continue per ID recs  - Recommend clobetasol 0.05% BID to affected areas   - RVP repeated to rule out viral etiology, negative

## 2023-12-09 LAB
ALBUMIN SERPL ELPH-MCNC: 3.9 G/DL — SIGNIFICANT CHANGE UP (ref 3.3–5)
ALBUMIN SERPL ELPH-MCNC: 3.9 G/DL — SIGNIFICANT CHANGE UP (ref 3.3–5)
ALP SERPL-CCNC: 61 U/L — SIGNIFICANT CHANGE UP (ref 40–120)
ALP SERPL-CCNC: 61 U/L — SIGNIFICANT CHANGE UP (ref 40–120)
ALT FLD-CCNC: 116 U/L — HIGH (ref 10–45)
ALT FLD-CCNC: 116 U/L — HIGH (ref 10–45)
ANION GAP SERPL CALC-SCNC: 12 MMOL/L — SIGNIFICANT CHANGE UP (ref 5–17)
ANION GAP SERPL CALC-SCNC: 12 MMOL/L — SIGNIFICANT CHANGE UP (ref 5–17)
APTT BLD: 80.3 SEC — HIGH (ref 24.5–35.6)
APTT BLD: 80.3 SEC — HIGH (ref 24.5–35.6)
APTT BLD: >200 SEC — CRITICAL HIGH (ref 24.5–35.6)
APTT BLD: >200 SEC — CRITICAL HIGH (ref 24.5–35.6)
AST SERPL-CCNC: 45 U/L — HIGH (ref 10–40)
AST SERPL-CCNC: 45 U/L — HIGH (ref 10–40)
BASOPHILS # BLD AUTO: 0.03 K/UL — SIGNIFICANT CHANGE UP (ref 0–0.2)
BASOPHILS # BLD AUTO: 0.03 K/UL — SIGNIFICANT CHANGE UP (ref 0–0.2)
BASOPHILS NFR BLD AUTO: 0.4 % — SIGNIFICANT CHANGE UP (ref 0–2)
BASOPHILS NFR BLD AUTO: 0.4 % — SIGNIFICANT CHANGE UP (ref 0–2)
BILIRUB SERPL-MCNC: 0.2 MG/DL — SIGNIFICANT CHANGE UP (ref 0.2–1.2)
BILIRUB SERPL-MCNC: 0.2 MG/DL — SIGNIFICANT CHANGE UP (ref 0.2–1.2)
BLD GP AB SCN SERPL QL: NEGATIVE — SIGNIFICANT CHANGE UP
BLD GP AB SCN SERPL QL: NEGATIVE — SIGNIFICANT CHANGE UP
BUN SERPL-MCNC: 34 MG/DL — HIGH (ref 7–23)
BUN SERPL-MCNC: 34 MG/DL — HIGH (ref 7–23)
CALCIUM SERPL-MCNC: 9.8 MG/DL — SIGNIFICANT CHANGE UP (ref 8.4–10.5)
CALCIUM SERPL-MCNC: 9.8 MG/DL — SIGNIFICANT CHANGE UP (ref 8.4–10.5)
CHLORIDE SERPL-SCNC: 102 MMOL/L — SIGNIFICANT CHANGE UP (ref 96–108)
CHLORIDE SERPL-SCNC: 102 MMOL/L — SIGNIFICANT CHANGE UP (ref 96–108)
CO2 SERPL-SCNC: 19 MMOL/L — LOW (ref 22–31)
CO2 SERPL-SCNC: 19 MMOL/L — LOW (ref 22–31)
CREAT SERPL-MCNC: 1.27 MG/DL — SIGNIFICANT CHANGE UP (ref 0.5–1.3)
CREAT SERPL-MCNC: 1.27 MG/DL — SIGNIFICANT CHANGE UP (ref 0.5–1.3)
CULTURE RESULTS: SIGNIFICANT CHANGE UP
EGFR: 65 ML/MIN/1.73M2 — SIGNIFICANT CHANGE UP
EGFR: 65 ML/MIN/1.73M2 — SIGNIFICANT CHANGE UP
EOSINOPHIL # BLD AUTO: 0.59 K/UL — HIGH (ref 0–0.5)
EOSINOPHIL # BLD AUTO: 0.59 K/UL — HIGH (ref 0–0.5)
EOSINOPHIL NFR BLD AUTO: 7.3 % — HIGH (ref 0–6)
EOSINOPHIL NFR BLD AUTO: 7.3 % — HIGH (ref 0–6)
GLUCOSE BLDC GLUCOMTR-MCNC: 116 MG/DL — HIGH (ref 70–99)
GLUCOSE BLDC GLUCOMTR-MCNC: 116 MG/DL — HIGH (ref 70–99)
GLUCOSE BLDC GLUCOMTR-MCNC: 158 MG/DL — HIGH (ref 70–99)
GLUCOSE BLDC GLUCOMTR-MCNC: 158 MG/DL — HIGH (ref 70–99)
GLUCOSE BLDC GLUCOMTR-MCNC: 174 MG/DL — HIGH (ref 70–99)
GLUCOSE BLDC GLUCOMTR-MCNC: 174 MG/DL — HIGH (ref 70–99)
GLUCOSE BLDC GLUCOMTR-MCNC: 232 MG/DL — HIGH (ref 70–99)
GLUCOSE BLDC GLUCOMTR-MCNC: 232 MG/DL — HIGH (ref 70–99)
GLUCOSE SERPL-MCNC: 222 MG/DL — HIGH (ref 70–99)
GLUCOSE SERPL-MCNC: 222 MG/DL — HIGH (ref 70–99)
HCT VFR BLD CALC: 35.9 % — LOW (ref 39–50)
HCT VFR BLD CALC: 35.9 % — LOW (ref 39–50)
HGB BLD-MCNC: 11.6 G/DL — LOW (ref 13–17)
HGB BLD-MCNC: 11.6 G/DL — LOW (ref 13–17)
IMM GRANULOCYTES NFR BLD AUTO: 0.5 % — SIGNIFICANT CHANGE UP (ref 0–0.9)
IMM GRANULOCYTES NFR BLD AUTO: 0.5 % — SIGNIFICANT CHANGE UP (ref 0–0.9)
INR BLD: 1.07 RATIO — SIGNIFICANT CHANGE UP (ref 0.85–1.18)
INR BLD: 1.07 RATIO — SIGNIFICANT CHANGE UP (ref 0.85–1.18)
LACTATE SERPL-SCNC: 1.3 MMOL/L — SIGNIFICANT CHANGE UP (ref 0.5–2)
LACTATE SERPL-SCNC: 1.3 MMOL/L — SIGNIFICANT CHANGE UP (ref 0.5–2)
LYMPHOCYTES # BLD AUTO: 1.47 K/UL — SIGNIFICANT CHANGE UP (ref 1–3.3)
LYMPHOCYTES # BLD AUTO: 1.47 K/UL — SIGNIFICANT CHANGE UP (ref 1–3.3)
LYMPHOCYTES # BLD AUTO: 18.2 % — SIGNIFICANT CHANGE UP (ref 13–44)
LYMPHOCYTES # BLD AUTO: 18.2 % — SIGNIFICANT CHANGE UP (ref 13–44)
MAGNESIUM SERPL-MCNC: 1.9 MG/DL — SIGNIFICANT CHANGE UP (ref 1.6–2.6)
MAGNESIUM SERPL-MCNC: 1.9 MG/DL — SIGNIFICANT CHANGE UP (ref 1.6–2.6)
MCHC RBC-ENTMCNC: 29.7 PG — SIGNIFICANT CHANGE UP (ref 27–34)
MCHC RBC-ENTMCNC: 29.7 PG — SIGNIFICANT CHANGE UP (ref 27–34)
MCHC RBC-ENTMCNC: 32.3 GM/DL — SIGNIFICANT CHANGE UP (ref 32–36)
MCHC RBC-ENTMCNC: 32.3 GM/DL — SIGNIFICANT CHANGE UP (ref 32–36)
MCV RBC AUTO: 92.1 FL — SIGNIFICANT CHANGE UP (ref 80–100)
MCV RBC AUTO: 92.1 FL — SIGNIFICANT CHANGE UP (ref 80–100)
MONOCYTES # BLD AUTO: 0.45 K/UL — SIGNIFICANT CHANGE UP (ref 0–0.9)
MONOCYTES # BLD AUTO: 0.45 K/UL — SIGNIFICANT CHANGE UP (ref 0–0.9)
MONOCYTES NFR BLD AUTO: 5.6 % — SIGNIFICANT CHANGE UP (ref 2–14)
MONOCYTES NFR BLD AUTO: 5.6 % — SIGNIFICANT CHANGE UP (ref 2–14)
NEUTROPHILS # BLD AUTO: 5.5 K/UL — SIGNIFICANT CHANGE UP (ref 1.8–7.4)
NEUTROPHILS # BLD AUTO: 5.5 K/UL — SIGNIFICANT CHANGE UP (ref 1.8–7.4)
NEUTROPHILS NFR BLD AUTO: 68 % — SIGNIFICANT CHANGE UP (ref 43–77)
NEUTROPHILS NFR BLD AUTO: 68 % — SIGNIFICANT CHANGE UP (ref 43–77)
NRBC # BLD: 0 /100 WBCS — SIGNIFICANT CHANGE UP (ref 0–0)
NRBC # BLD: 0 /100 WBCS — SIGNIFICANT CHANGE UP (ref 0–0)
PHOSPHATE SERPL-MCNC: 3.5 MG/DL — SIGNIFICANT CHANGE UP (ref 2.5–4.5)
PHOSPHATE SERPL-MCNC: 3.5 MG/DL — SIGNIFICANT CHANGE UP (ref 2.5–4.5)
PLATELET # BLD AUTO: 131 K/UL — LOW (ref 150–400)
PLATELET # BLD AUTO: 131 K/UL — LOW (ref 150–400)
POTASSIUM SERPL-MCNC: 4.6 MMOL/L — SIGNIFICANT CHANGE UP (ref 3.5–5.3)
POTASSIUM SERPL-MCNC: 4.6 MMOL/L — SIGNIFICANT CHANGE UP (ref 3.5–5.3)
POTASSIUM SERPL-SCNC: 4.6 MMOL/L — SIGNIFICANT CHANGE UP (ref 3.5–5.3)
POTASSIUM SERPL-SCNC: 4.6 MMOL/L — SIGNIFICANT CHANGE UP (ref 3.5–5.3)
PROT SERPL-MCNC: 6.7 G/DL — SIGNIFICANT CHANGE UP (ref 6–8.3)
PROT SERPL-MCNC: 6.7 G/DL — SIGNIFICANT CHANGE UP (ref 6–8.3)
PROTHROM AB SERPL-ACNC: 11.2 SEC — SIGNIFICANT CHANGE UP (ref 9.5–13)
PROTHROM AB SERPL-ACNC: 11.2 SEC — SIGNIFICANT CHANGE UP (ref 9.5–13)
RBC # BLD: 3.9 M/UL — LOW (ref 4.2–5.8)
RBC # BLD: 3.9 M/UL — LOW (ref 4.2–5.8)
RBC # FLD: 15.9 % — HIGH (ref 10.3–14.5)
RBC # FLD: 15.9 % — HIGH (ref 10.3–14.5)
RH IG SCN BLD-IMP: POSITIVE — SIGNIFICANT CHANGE UP
RH IG SCN BLD-IMP: POSITIVE — SIGNIFICANT CHANGE UP
SODIUM SERPL-SCNC: 133 MMOL/L — LOW (ref 135–145)
SODIUM SERPL-SCNC: 133 MMOL/L — LOW (ref 135–145)
SPECIMEN SOURCE: SIGNIFICANT CHANGE UP
WBC # BLD: 8.08 K/UL — SIGNIFICANT CHANGE UP (ref 3.8–10.5)
WBC # BLD: 8.08 K/UL — SIGNIFICANT CHANGE UP (ref 3.8–10.5)
WBC # FLD AUTO: 8.08 K/UL — SIGNIFICANT CHANGE UP (ref 3.8–10.5)
WBC # FLD AUTO: 8.08 K/UL — SIGNIFICANT CHANGE UP (ref 3.8–10.5)

## 2023-12-09 PROCEDURE — 93010 ELECTROCARDIOGRAM REPORT: CPT

## 2023-12-09 PROCEDURE — 71045 X-RAY EXAM CHEST 1 VIEW: CPT | Mod: 26

## 2023-12-09 PROCEDURE — 99232 SBSQ HOSP IP/OBS MODERATE 35: CPT

## 2023-12-09 PROCEDURE — 99291 CRITICAL CARE FIRST HOUR: CPT

## 2023-12-09 PROCEDURE — 99292 CRITICAL CARE ADDL 30 MIN: CPT

## 2023-12-09 RX ORDER — MAGNESIUM SULFATE 500 MG/ML
1 VIAL (ML) INJECTION ONCE
Refills: 0 | Status: COMPLETED | OUTPATIENT
Start: 2023-12-09 | End: 2023-12-09

## 2023-12-09 RX ADMIN — ISOSORBIDE DINITRATE 30 MILLIGRAM(S): 5 TABLET ORAL at 17:32

## 2023-12-09 RX ADMIN — ISOSORBIDE DINITRATE 30 MILLIGRAM(S): 5 TABLET ORAL at 12:21

## 2023-12-09 RX ADMIN — HEPARIN SODIUM 14 UNIT(S)/HR: 5000 INJECTION INTRAVENOUS; SUBCUTANEOUS at 21:30

## 2023-12-09 RX ADMIN — Medication 1 APPLICATION(S): at 21:30

## 2023-12-09 RX ADMIN — HEPARIN SODIUM 14 UNIT(S)/HR: 5000 INJECTION INTRAVENOUS; SUBCUTANEOUS at 17:32

## 2023-12-09 RX ADMIN — Medication 81 MILLIGRAM(S): at 12:21

## 2023-12-09 RX ADMIN — INSULIN GLARGINE 12 UNIT(S): 100 INJECTION, SOLUTION SUBCUTANEOUS at 21:29

## 2023-12-09 RX ADMIN — Medication 9 UNIT(S): at 08:24

## 2023-12-09 RX ADMIN — BUDESONIDE AND FORMOTEROL FUMARATE DIHYDRATE 2 PUFF(S): 160; 4.5 AEROSOL RESPIRATORY (INHALATION) at 08:42

## 2023-12-09 RX ADMIN — Medication 9 UNIT(S): at 12:21

## 2023-12-09 RX ADMIN — CARVEDILOL PHOSPHATE 25 MILLIGRAM(S): 80 CAPSULE, EXTENDED RELEASE ORAL at 05:34

## 2023-12-09 RX ADMIN — CHLORHEXIDINE GLUCONATE 1 APPLICATION(S): 213 SOLUTION TOPICAL at 21:49

## 2023-12-09 RX ADMIN — BUDESONIDE AND FORMOTEROL FUMARATE DIHYDRATE 2 PUFF(S): 160; 4.5 AEROSOL RESPIRATORY (INHALATION) at 20:29

## 2023-12-09 RX ADMIN — Medication 75 MILLIGRAM(S): at 05:33

## 2023-12-09 RX ADMIN — Medication 1: at 17:31

## 2023-12-09 RX ADMIN — ATORVASTATIN CALCIUM 80 MILLIGRAM(S): 80 TABLET, FILM COATED ORAL at 21:27

## 2023-12-09 RX ADMIN — Medication 100 GRAM(S): at 01:40

## 2023-12-09 RX ADMIN — Medication 2: at 08:24

## 2023-12-09 RX ADMIN — Medication 1: at 12:22

## 2023-12-09 RX ADMIN — Medication 75 MILLIGRAM(S): at 14:56

## 2023-12-09 RX ADMIN — Medication 75 MILLIGRAM(S): at 21:29

## 2023-12-09 RX ADMIN — Medication 1 APPLICATION(S): at 06:23

## 2023-12-09 RX ADMIN — Medication 9 UNIT(S): at 17:32

## 2023-12-09 RX ADMIN — Medication 0: at 21:29

## 2023-12-09 RX ADMIN — CARVEDILOL PHOSPHATE 25 MILLIGRAM(S): 80 CAPSULE, EXTENDED RELEASE ORAL at 17:32

## 2023-12-09 RX ADMIN — ISOSORBIDE DINITRATE 30 MILLIGRAM(S): 5 TABLET ORAL at 08:26

## 2023-12-09 NOTE — PROGRESS NOTE ADULT - SUBJECTIVE AND OBJECTIVE BOX
CICU DAY NOTE  Admission date: 11/1/23  Chief complaint/ Diagnosis: Cardiogenic shock   HPI: 57yo M w/ hx HTN, CAD w/ 1 stent in 2009, ICH (2008) presenting with abn ekg. Patient presented to [facility] where he was found to have STEMI, recommended to get cath however patient did not want to get it there so it left and came here.  Patient initially had cough, congestion, fever, was placed on antibiotics on Sunday.  Started feeling nauseous and had a presyncopal event after which he presented to ED last night.  Had chest pain as well.  Chest pain is midsternal.  Not currently having chest pain.  Received 4 aspirin 30 min pta. (01 Nov 2023 15:11)    Interval history: Unevenful overnight  REVIEW OF SYSTEMS  Denies CP, Palpitation, SOB, Dyspnea [ x ] All other systems negative    MEDICATIONS  (STANDING)  aspirin  chewable 81 milliGRAM(s) Oral daily  atorvastatin 80 milliGRAM(s) Oral at bedtime  budesonide  80 MICROgram(s)/formoterol 4.5 MICROgram(s) Inhaler 2 Puff(s) Inhalation two times a day  carvedilol 25 milliGRAM(s) Oral every 12 hours  heparin  Infusion 1300 Unit(s)/Hr (14 mL/Hr) IV Continuous <Continuous>  hydrALAZINE 75 milliGRAM(s) Oral three times a day  insulin glargine Injectable (LANTUS) 12 Unit(s) SubCutaneous at bedtime  insulin lispro (ADMELOG) corrective regimen sliding scale   SubCutaneous three times a day before meals  insulin lispro (ADMELOG) corrective regimen sliding scale   SubCutaneous at bedtime  insulin lispro Injectable (ADMELOG) 9 Unit(s) SubCutaneous three times a day before meals  isosorbide   dinitrate Tablet (ISORDIL) 30 milliGRAM(s) Oral three times a day  polyethylene glycol 3350 17 Gram(s) Oral two times a day  senna 2 Tablet(s) Oral at bedtime    MEDICATIONS  (PRN):  hydrOXYzine hydrochloride 25 milliGRAM(s) Oral two times a day PRN Anxiety    FAMILY HISTORY:  Family history of meningitis (Sibling)  Brother, death at age 49 from complications of infection (June 2015)  Family history of hypertension in mother    Allergy   penicillins (Unknown)    ICU Vital Signs Last 24 Hrs  T(C): 36.8 (Max: 37.2)  HR: 88  (76 - 92)  Aug 70-80's  RR: 25  (14 - 26)  SpO2: 99%  (93% - 99%) on room air     I&O's Summary  IN: 802 mL / OUT: 2100 mL / NET: -1298 mL    PHYSICAL EXAM  Appearance: Normal, NAD  HEAD:  Normocephalic  EYES: PERRLA, conjunctiva and sclera clear  NECK: Supple, No JVD  CHEST/LUNG: Clear to auscultation bilaterally; No wheezing  HEART: Normal S1, S2. No murmurs, rubs, or gallops  ABDOMEN: + Bowel sounds, Soft, NT, ND   EXTREMITIES:  2+ Peripheral Pulses, No clubbing, cyanosis, or edema  NEUROLOGY: non-focal, aaox3  SKIN: No rashes or lesions    Interpretation of Telemetry:                        11.6<11.6   8.08  )-----------( 131      ( 09 Dec 2023 00:59 )             35.9     133<134<135  |  102  |  34<H>  ----------------------------<  222<H>  4.6   |  19<L>  |  1.27<1.21    Ca    9.8  Phos  3.5     Mg     1.9       TPro  6.7  /  Alb  3.9  /  TBili  0.2  /  DBili  x   /  AST  45<H>  /  ALT  116<H>  /  AlkPhos  61    F/S 100-191, Lactate 1.3  PTT 80.3       CICU DAY NOTE  Admission date: 11/1/23  Chief complaint/ Diagnosis: Cardiogenic shock   HPI: 57yo M w/ hx HTN, CAD w/ 1 stent in 2009, ICH (2008) presenting with abn ekg. Patient presented to [facility] where he was found to have STEMI, recommended to get cath however patient did not want to get it there so it left and came here.  Patient initially had cough, congestion, fever, was placed on antibiotics on Sunday.  Started feeling nauseous and had a presyncopal event after which he presented to ED last night.  Had chest pain as well.  Chest pain is midsternal.  Not currently having chest pain.  Received 4 aspirin 30 min pta. (01 Nov 2023 15:11)    Interval history: Unevenful overnight  REVIEW OF SYSTEMS  Denies CP, Palpitation, SOB, Dyspnea [ x ] All other systems negative    MEDICATIONS  (STANDING)  aspirin  chewable 81 milliGRAM(s) Oral daily  atorvastatin 80 milliGRAM(s) Oral at bedtime  budesonide  80 MICROgram(s)/formoterol 4.5 MICROgram(s) Inhaler 2 Puff(s) Inhalation two times a day  carvedilol 25 milliGRAM(s) Oral every 12 hours  heparin  Infusion 1300 Unit(s)/Hr (14 mL/Hr) IV Continuous <Continuous>  hydrALAZINE 75 milliGRAM(s) Oral three times a day  insulin glargine Injectable (LANTUS) 12 Unit(s) SubCutaneous at bedtime  insulin lispro (ADMELOG) corrective regimen sliding scale   SubCutaneous three times a day before meals  insulin lispro (ADMELOG) corrective regimen sliding scale   SubCutaneous at bedtime  insulin lispro Injectable (ADMELOG) 9 Unit(s) SubCutaneous three times a day before meals  isosorbide   dinitrate Tablet (ISORDIL) 30 milliGRAM(s) Oral three times a day  polyethylene glycol 3350 17 Gram(s) Oral two times a day  senna 2 Tablet(s) Oral at bedtime    MEDICATIONS  (PRN):  hydrOXYzine hydrochloride 25 milliGRAM(s) Oral two times a day PRN Anxiety    FAMILY HISTORY:  Family history of meningitis (Sibling)  Brother, death at age 49 from complications of infection (June 2015)  Family history of hypertension in mother    Allergy   penicillins (Unknown)    ICU Vital Signs Last 24 Hrs  T(C): 36.8 (Max: 37.2)  HR: 88  (76 - 92)  Aug 70-80's  RR: 25  (14 - 26)  SpO2: 99%  (93% - 99%) on room air     I&O's Summary  IN: 802 mL / OUT: 2100 mL / NET: -1298 mL    PHYSICAL EXAM  Appearance: Normal, NAD  HEAD:  Normocephalic  EYES: PERRLA, conjunctiva and sclera clear  NECK: Supple, No JVD  CHEST/LUNG: Clear to auscultation bilaterally; No wheezing  HEART: Normal S1, S2. No murmurs, rubs, or gallops  ABDOMEN: + Bowel sounds, Soft, NT, ND   EXTREMITIES:  2+ Peripheral Pulses, No clubbing, cyanosis, or edema  NEUROLOGY: non-focal, aaox3  SKIN: No rashes or lesions    Interpretation of Telemetry:                        11.6<11.6   8.08  )-----------( 131      ( 09 Dec 2023 00:59 )             35.9     133<134<135  |  102  |  34<H>  ----------------------------<  222<H>  4.6   |  19<L>  |  1.27<1.21    Ca    9.8  Phos  3.5     Mg     1.9       TPro  6.7  /  Alb  3.9  /  TBili  0.2  /  DBili  x   /  AST  45<H>  /  ALT  116<H>  /  AlkPhos  61    F/S 100-191, Lactate 1.3  PTT 80.3       CICU DAY NOTE  Admission date: 11/1/23  Chief complaint/ Diagnosis: Cardiogenic shock   HPI: 59yo M w/ hx HTN, CAD w/ 1 stent in 2009, ICH (2008) presenting with abn ekg. Patient presented to [facility] where he was found to have STEMI, recommended to get cath however patient did not want to get it there so it left and came here.  Patient initially had cough, congestion, fever, was placed on antibiotics on Sunday.  Started feeling nauseous and had a presyncopal event after which he presented to ED last night.  Had chest pain as well.  Chest pain is midsternal.  Not currently having chest pain.  Received 4 aspirin 30 min pta. (01 Nov 2023 15:11)    Interval history: Unevenful overnight  REVIEW OF SYSTEMS  Denies CP, Palpitation, SOB, Dyspnea [ x ] All other systems negative    MEDICATIONS  (STANDING)  aspirin  chewable 81 milliGRAM(s) Oral daily  atorvastatin 80 milliGRAM(s) Oral at bedtime  budesonide  80 MICROgram(s)/formoterol 4.5 MICROgram(s) Inhaler 2 Puff(s) Inhalation two times a day  carvedilol 25 milliGRAM(s) Oral every 12 hours  heparin  Infusion 1300 Unit(s)/Hr (14 mL/Hr) IV Continuous <Continuous>  hydrALAZINE 75 milliGRAM(s) Oral three times a day  insulin glargine Injectable (LANTUS) 12 Unit(s) SubCutaneous at bedtime  insulin lispro (ADMELOG) corrective regimen sliding scale   SubCutaneous three times a day before meals  insulin lispro (ADMELOG) corrective regimen sliding scale   SubCutaneous at bedtime  insulin lispro Injectable (ADMELOG) 9 Unit(s) SubCutaneous three times a day before meals  isosorbide   dinitrate Tablet (ISORDIL) 30 milliGRAM(s) Oral three times a day  polyethylene glycol 3350 17 Gram(s) Oral two times a day  senna 2 Tablet(s) Oral at bedtime    MEDICATIONS  (PRN):  hydrOXYzine hydrochloride 25 milliGRAM(s) Oral two times a day PRN Anxiety    FAMILY HISTORY:  Family history of meningitis (Sibling)  Brother, death at age 49 from complications of infection (June 2015)  Family history of hypertension in mother    Allergy   penicillins (Unknown)    ICU Vital Signs Last 24 Hrs  T(C): 36.8 (Max: 37.2)  HR: 88  (76 - 92)  Aug 70-80's  RR: 25  (14 - 26)  SpO2: 99%  (93% - 99%) on room air     I&O's Summary  IN: 802 mL / OUT: 2100 mL / NET: -1298 mL    PHYSICAL EXAM  Appearance: Normal, NAD  HEAD:  Normocephalic  EYES: PERRLA, conjunctiva and sclera clear  NECK: Supple, No JVD  CHEST/LUNG: Clear to auscultation bilaterally; No wheezing  HEART: Normal S1, S2. No murmurs, rubs, or gallops  ABDOMEN: + Bowel sounds, Soft, NT, ND   EXTREMITIES:  2+ Peripheral Pulses, No clubbing, cyanosis, or edema  NEUROLOGY: non-focal, aaox3  SKIN: Bilateral upper extremity rash no exudate or open wounds     Interpretation of Telemetry:                        11.6<11.6   8.08  )-----------( 131      ( 09 Dec 2023 00:59 )             35.9     133<134<135  |  102  |  34<H>  ----------------------------<  222<H>  4.6   |  19<L>  |  1.27<1.21    Ca    9.8  Phos  3.5     Mg     1.9       TPro  6.7  /  Alb  3.9  /  TBili  0.2  /  DBili  x   /  AST  45<H>  /  ALT  116<H>  /  AlkPhos  61    F/S 100-191, Lactate 1.3  PTT 80.3       CICU DAY NOTE  Admission date: 11/1/23  Chief complaint/ Diagnosis: Cardiogenic shock   HPI: 59yo M w/ hx HTN, CAD w/ 1 stent in 2009, ICH (2008) presenting with abn ekg. Patient presented to [facility] where he was found to have STEMI, recommended to get cath however patient did not want to get it there so it left and came here.  Patient initially had cough, congestion, fever, was placed on antibiotics on Sunday.  Started feeling nauseous and had a presyncopal event after which he presented to ED last night.  Had chest pain as well.  Chest pain is midsternal.  Not currently having chest pain.  Received 4 aspirin 30 min pta. (01 Nov 2023 15:11)    Interval history: Unevenful overnight  REVIEW OF SYSTEMS  Denies CP, Palpitation, SOB, Dyspnea [ x ] All other systems negative    MEDICATIONS  (STANDING)  aspirin  chewable 81 milliGRAM(s) Oral daily  atorvastatin 80 milliGRAM(s) Oral at bedtime  budesonide  80 MICROgram(s)/formoterol 4.5 MICROgram(s) Inhaler 2 Puff(s) Inhalation two times a day  carvedilol 25 milliGRAM(s) Oral every 12 hours  heparin  Infusion 1300 Unit(s)/Hr (14 mL/Hr) IV Continuous <Continuous>  hydrALAZINE 75 milliGRAM(s) Oral three times a day  insulin glargine Injectable (LANTUS) 12 Unit(s) SubCutaneous at bedtime  insulin lispro (ADMELOG) corrective regimen sliding scale   SubCutaneous three times a day before meals  insulin lispro (ADMELOG) corrective regimen sliding scale   SubCutaneous at bedtime  insulin lispro Injectable (ADMELOG) 9 Unit(s) SubCutaneous three times a day before meals  isosorbide   dinitrate Tablet (ISORDIL) 30 milliGRAM(s) Oral three times a day  polyethylene glycol 3350 17 Gram(s) Oral two times a day  senna 2 Tablet(s) Oral at bedtime    MEDICATIONS  (PRN):  hydrOXYzine hydrochloride 25 milliGRAM(s) Oral two times a day PRN Anxiety    FAMILY HISTORY:  Family history of meningitis (Sibling)  Brother, death at age 49 from complications of infection (June 2015)  Family history of hypertension in mother    Allergy   penicillins (Unknown)    ICU Vital Signs Last 24 Hrs  T(C): 36.8 (Max: 37.2)  HR: 88  (76 - 92)  Aug 70-80's  RR: 25  (14 - 26)  SpO2: 99%  (93% - 99%) on room air     I&O's Summary  IN: 802 mL / OUT: 2100 mL / NET: -1298 mL    PHYSICAL EXAM  Appearance: Normal, NAD  HEAD:  Normocephalic  EYES: PERRLA, conjunctiva and sclera clear  NECK: Supple, No JVD  CHEST/LUNG: Clear to auscultation bilaterally; No wheezing  HEART: Normal S1, S2. No murmurs, rubs, or gallops  ABDOMEN: + Bowel sounds, Soft, NT, ND   EXTREMITIES:  2+ Peripheral Pulses, No clubbing, cyanosis, or edema  NEUROLOGY: non-focal, aaox3  SKIN: Bilateral upper extremity rash no exudate or open wounds     Interpretation of Telemetry:                        11.6<11.6   8.08  )-----------( 131      ( 09 Dec 2023 00:59 )             35.9     133<134<135  |  102  |  34<H>  ----------------------------<  222<H>  4.6   |  19<L>  |  1.27<1.21    Ca    9.8  Phos  3.5     Mg     1.9       TPro  6.7  /  Alb  3.9  /  TBili  0.2  /  DBili  x   /  AST  45<H>  /  ALT  116<H>  /  AlkPhos  61    F/S 100-191, Lactate 1.3  PTT 80.3

## 2023-12-09 NOTE — PROGRESS NOTE ADULT - PROBLEM SELECTOR PLAN 2
Hyponatremia: developed during this hospital course. He is not symptomatic. Urine Osm 471 and Urine Na 88. Given 150cc bolus of 3% saline on 11/24. Na today 133 and pt remained asymptomatic. C/w oral intake of food and limit free water <1-1.5L/day.

## 2023-12-09 NOTE — PROGRESS NOTE ADULT - PROBLEM SELECTOR PLAN 4
- Cr on admission 1.2, peaked to 4, now at relative baseline  - Support as above.  -monitor closely  - Appreciate CardioRenal input.

## 2023-12-09 NOTE — PROGRESS NOTE ADULT - SUBJECTIVE AND OBJECTIVE BOX
Patient seen and examined at bedside.    Overnight Events:   No events. Denies CP, palpitations, SOB.     REVIEW OF SYSTEMS:  Constitutional:     [x ] negative [ ] fevers [ ] chills [ ] weight loss [ ] weight gain  HEENT:                  [x ] negative [ ] dry eyes [ ] eye irritation [ ] postnasal drip [ ] nasal congestion  CV:                         [ x] negative  [ ] chest pain [ ] orthopnea [ ] palpitations [ ] murmur  Resp:                     [x ] negative [ ] cough [ ] shortness of breath [ ] dyspnea [ ] wheezing [ ] sputum [ ]hemoptysis  GI:                          [x ] negative [ ] nausea [ ] vomiting [ ] diarrhea [ ] constipation [ ] abd pain [ ] dysphagia   :                        [ x] negative [ ] dysuria [ ] nocturia [ ] hematuria [ ] increased urinary frequency  Musculoskeletal: [x ] negative [ ] back pain [ ] myalgias [ ] arthralgias [ ] fracture  Skin:                       [ x] negative [ ] rash [ ] itch  Neurological:        [ x] negative [ ] headache [ ] dizziness [ ] syncope [ ] weakness [ ] numbness  Psychiatric:           [ x] negative [ ] anxiety [ ] depression  Endocrine:            [ x] negative [ ] diabetes [ ] thyroid problem  Heme/Lymph:      [ x] negative [ ] anemia [ ] bleeding problem  Allergic/Immune: [ x] negative [ ] itchy eyes [ ] nasal discharge [ ] hives [ ] angioedema    [ x] All other systems negative          Current Meds:  aspirin  chewable 81 milliGRAM(s) Oral daily  atorvastatin 80 milliGRAM(s) Oral at bedtime  budesonide  80 MICROgram(s)/formoterol 4.5 MICROgram(s) Inhaler 2 Puff(s) Inhalation two times a day  carvedilol 25 milliGRAM(s) Oral every 12 hours  chlorhexidine 2% Cloths 1 Application(s) Topical daily  clobetasol 0.05% Ointment 1 Application(s) Topical two times a day  heparin  Infusion 1300 Unit(s)/Hr IV Continuous <Continuous>  hydrALAZINE 75 milliGRAM(s) Oral three times a day  hydrOXYzine hydrochloride 25 milliGRAM(s) Oral two times a day PRN  insulin glargine Injectable (LANTUS) 12 Unit(s) SubCutaneous at bedtime  insulin lispro (ADMELOG) corrective regimen sliding scale   SubCutaneous at bedtime  insulin lispro (ADMELOG) corrective regimen sliding scale   SubCutaneous three times a day before meals  insulin lispro Injectable (ADMELOG) 9 Unit(s) SubCutaneous three times a day before meals  isosorbide   dinitrate Tablet (ISORDIL) 30 milliGRAM(s) Oral three times a day  polyethylene glycol 3350 17 Gram(s) Oral two times a day  senna 2 Tablet(s) Oral at bedtime      Vitals:  T(F): 98.1 (12-09), Max: 99 (12-08)  HR: 84 (12-09) (76 - 92)  BP: --  RR: 19 (12-09)  SpO2: 96% (12-09)  I&O's Summary    08 Dec 2023 07:01  -  09 Dec 2023 07:00  --------------------------------------------------------  IN: 816 mL / OUT: 2100 mL / NET: -1284 mL    09 Dec 2023 07:01  -  09 Dec 2023 12:07  --------------------------------------------------------  IN: 296 mL / OUT: 450 mL / NET: -154 mL      PHYSICAL EXAM:  Appearance: No acute distress; well appearing  Eyes: EOMI, no scleral icterus   HEENT: Normal oral mucosa  Cardiovascular: RRR, S1, S2, no murmurs, rubs, or gallops; no edema; no JVD. IABP in place.   Respiratory: Clear to auscultation bilaterally, no auditory stridor   Gastrointestinal: soft, non-tender, non-distended with normal bowel sounds  Musculoskeletal: No clubbing; no joint deformity   Neurologic: Moving all 4 extremities spontaneously, sensation grossly intact  Psychiatry: AAOx3, mood & affect appropriate  Skin: No rashes, ecchymoses, or cyanosis                          11.6   8.08  )-----------( 131      ( 09 Dec 2023 00:59 )             35.9     12-09    133<L>  |  102  |  34<H>  ----------------------------<  222<H>  4.6   |  19<L>  |  1.27    Ca    9.8      09 Dec 2023 00:59  Phos  3.5     12-09  Mg     1.9     12-09    TPro  6.7  /  Alb  3.9  /  TBili  0.2  /  DBili  x   /  AST  45<H>  /  ALT  116<H>  /  AlkPhos  61  12-09    PT/INR - ( 09 Dec 2023 00:59 )   PT: 11.2 sec;   INR: 1.07 ratio         PTT - ( 09 Dec 2023 02:28 )  PTT:80.3 sec

## 2023-12-09 NOTE — PROGRESS NOTE ADULT - ASSESSMENT
58 yrs old male w/ hx of HFrEF (LVIDd 6.4 cm, LVEF 32%), CAD s/p PCI (2008), HTN, DMT2 (A1c 8.3%) and CVA s/p TPA (2018), recently treated for PNA who initially presented to [facility] via EMS after syncope reportedly requiring defibrillation Treated for ACS but left AMA to come to [facility]. Pt has covid and was placed on IABP for hypotension. Now being evaluated for Heart transplant Vs LVAD placement. Nephrology consulted for ARIC.               58 yrs old male w/ hx of HFrEF (LVIDd 6.4 cm, LVEF 32%), CAD s/p PCI (2008), HTN, DMT2 (A1c 8.3%) and CVA s/p TPA (2018), recently treated for PNA who initially presented to [facility] via EMS after syncope reportedly requiring defibrillation Treated for ACS but left AMA to come to [facility]. Pt has covid and was placed on IABP for hypotension. Now being evaluated for Heart transplant Vs LVAD placement. Nephrology consulted for ARIC.

## 2023-12-09 NOTE — PROGRESS NOTE ADULT - SUBJECTIVE AND OBJECTIVE BOX
DEVORAH VALENCIA  MRN-[id number]  Patient is a 58y old  Male who presents with a chief complaint of cardiogenic shock (09 Dec 2023 06:37)    HPI:  pt seen and approx 1:30 pm  in [facility]    57yo M w/ hx HTN, CAD w/ 1 stent in , ICH () presenting with abn ekg. Patient presented to [facility] where he was found to have STEMI, recommended to get cath however patient did not want to get it there so it left and came here.  Patient initially had cough, congestion, fever, was placed on antibiotics on .  Started feeling nauseous and had a presyncopal event after which he presented to ED last night.  Had chest pain as well.  Chest pain is midsternal.  Not currently having chest pain.  Received 4 aspirin 30 min pta. (2023 15:11)      Hospital Course:    24 HOUR EVENTS:    REVIEW OF SYSTEMS:    CONSTITUTIONAL: No weakness, fevers or chills  EYES/ENT: No visual changes;  No vertigo or throat pain   NECK: No pain or stiffness  RESPIRATORY: No cough, wheezing, hemoptysis; No shortness of breath  CARDIOVASCULAR: No chest pain or palpitations  GASTROINTESTINAL: No abdominal or epigastric pain. No nausea, vomiting, or hematemesis; No diarrhea or constipation. No melena or hematochezia.  GENITOURINARY: No dysuria, frequency or hematuria  NEUROLOGICAL: No numbness or weakness  SKIN: No itching, rashes      ICU Vital Signs Last 24 Hrs  T(C): 36.7 (09 Dec 2023 17:00), Max: 37.2 (08 Dec 2023 23:00)  T(F): 98 (09 Dec 2023 17:00), Max: 99 (08 Dec 2023 23:00)  HR: 81 (09 Dec 2023 19:00) (79 - 92)  BP: --  BP(mean): --  ABP: --  ABP(mean): --  RR: 22 (09 Dec 2023 19:00) (15 - 26)  SpO2: 97% (09 Dec 2023 19:00) (93% - 99%)    O2 Parameters below as of 09 Dec 2023 19:00  Patient On (Oxygen Delivery Method): room air            CVP(mm Hg): --  CO: --  CI: --  PA: --  PA(mean): --  PA(direct): --  PCWP: --  LA: --  RA: --  SVR: --  SVRI: --  PVR: --  PVRI: --  I&O's Summary    08 Dec 2023 07:01  -  09 Dec 2023 07:00  --------------------------------------------------------  IN: 816 mL / OUT: 2100 mL / NET: -1284 mL    09 Dec 2023 07:01  -  09 Dec 2023 19:51  --------------------------------------------------------  IN: 722 mL / OUT: 700 mL / NET: 22 mL        CAPILLARY BLOOD GLUCOSE    CAPILLARY BLOOD GLUCOSE      POCT Blood Glucose.: 174 mg/dL (09 Dec 2023 17:29)      PHYSICAL EXAM:  GENERAL: No acute distress, well-developed  HEAD:  Atraumatic, Normocephalic  EYES: EOMI, PERRLA, conjunctiva and sclera clear  NECK: Supple, no lymphadenopathy, no JVD  CHEST/LUNG: CTAB; No wheezes, rales, or rhonchi  HEART: Regular rate and rhythm. Normal S1/S2. No murmurs, rubs, or gallops  ABDOMEN: Soft, non-tender, non-distended; normal bowel sounds, no organomegaly  EXTREMITIES:  2+ peripheral pulses b/l, No clubbing, cyanosis, or edema  NEUROLOGY: A&O x 3, no focal deficits  SKIN: No rashes or lesions    ============================I/O===========================   I&O's Detail    08 Dec 2023 07:01  -  09 Dec 2023 07:00  --------------------------------------------------------  IN:    Heparin: 336 mL    Oral Fluid: 480 mL  Total IN: 816 mL    OUT:    Voided (mL): 2100 mL  Total OUT: 2100 mL    Total NET: -1284 mL      09 Dec 2023 07:01  -  09 Dec 2023 19:51  --------------------------------------------------------  IN:    Heparin: 182 mL    Oral Fluid: 540 mL  Total IN: 722 mL    OUT:    Voided (mL): 700 mL  Total OUT: 700 mL    Total NET: 22 mL        ============================ LABS =========================                        11.6   8.08  )-----------( 131      ( 09 Dec 2023 00:59 )             35.9         133<L>  |  102  |  34<H>  ----------------------------<  222<H>  4.6   |  19<L>  |  1.27    Ca    9.8      09 Dec 2023 00:59  Phos  3.5       Mg     1.9         TPro  6.7  /  Alb  3.9  /  TBili  0.2  /  DBili  x   /  AST  45<H>  /  ALT  116<H>  /  AlkPhos  61                  LIVER FUNCTIONS - ( 09 Dec 2023 00:59 )  Alb: 3.9 g/dL / Pro: 6.7 g/dL / ALK PHOS: 61 U/L / ALT: 116 U/L / AST: 45 U/L / GGT: x           PT/INR - ( 09 Dec 2023 00:59 )   PT: 11.2 sec;   INR: 1.07 ratio         PTT - ( 09 Dec 2023 02:28 )  PTT:80.3 sec    Lactate, Blood: 1.3 mmol/L (23 @ 00:59)  Lactate, Blood: 1.5 mmol/L (23 @ 01:22)  Lactate, Blood: 1.7 mmol/L (23 @ 00:58)    Urinalysis Basic - ( 09 Dec 2023 00:59 )    Color: x / Appearance: x / SG: x / pH: x  Gluc: 222 mg/dL / Ketone: x  / Bili: x / Urobili: x   Blood: x / Protein: x / Nitrite: x   Leuk Esterase: x / RBC: x / WBC x   Sq Epi: x / Non Sq Epi: x / Bacteria: x      ======================Micro/Rad/Cardio=================  Telemtry: Reviewed   EKG: Reviewed  CXR: Reviewed  Culture: Reviewed   Echo:   Cath: Cardiac Cath Lab - Adult:   [facility]  Department of Cardiology  [address] [place] [address]  [phone]  Cath Lab Report -- Comprehensive Report  Patient: LD VALENCIA  Study date: 2017  Account number: [id number]  MR number: [id number]  : 1965  Gender: Male  Race: W  Case Physician(s):  Jairon Holguin M.D.  Referring Physician:  Luc Lynn M.D.  INDICATIONS: Unstable angina - CCS4.  HISTORY: The patient has a history of coronary artery disease. The patient  hashypertension and medication-treated dyslipidemia.  PROCEDURE:  --  Left heart catheterization with ventriculography.  --  Left coronary angiography.  --  Right coronary angiography.  TECHNIQUE: The risks and alternatives of the procedures and conscious  sedation were explained to the patient and informed consent was obtained.  Cardiac catheterization performed electively.  Local anesthetic given. Right radial artery access. A 6FR PRELUDE KIT was  inserted in the vessel. Left heart catheterization. Ventriculography was  performed. A 5FR FR4.0 EXPO catheter was utilized. Left coronary artery  angiography. The vessel was injected utilizing a 5FR FL3.5 EXPO catheter.  Right coronary artery angiography. The vessel was injected utilizing a 5FR  FR4.0 EXPO catheter. RADIATION EXPOSURE: 1.1 min.  CONTRAST GIVEN: Omnipaque 55 ml.  MEDICATIONS GIVEN: Midazolam, 1 mg, IV. Fentanyl, 25 mcg, IV. Verapamil  (Isoptin, Calan, Covera), 2.5 mg, IA. Heparin, 3000 units, IA.  VENTRICLES: Global left ventricular function was moderately depressed. EF  estimated was 40 %.  CORONARY VESSELS: The coronary circulation is right dominant.  LM:   --  LM: Normal.  LAD:   --  Proximal LAD: There was a 50 % stenosis.  CX:   --  Circumflex: Normal.  RCA:   --  Mid RCA: There was a 40 % stenosis.  --  Distal RCA: There was a 50 % stenosis.  COMPLICATIONS: There were no complications.  DIAGNOSTIC RECOMMENDATIONS: The patient should continue with the present  medications.  Prepared and signed by  Jairon Holguin M.D.  Signed 2017 12:20:13  HEMODYNAMIC TABLES  Pressures:  Baseline/ Room Air  Pressures:  - HR: 78  Pressures:  - Rhythm:  Pressures:  -- Aortic Pressure (S/D/M): --/--/99  Pressures:  -- Left Ventricle (s/edp): 157/39/--  Outputs:  Baseline/ Room Air  Outputs:  -- CALCULATIONS[age]  Outputs:  -- CALCULATIONS: Body Surface Area: 2.05  Outputs:  -- CALCULATIONS: Height in cm: 175.00  Outputs:  -- CALCULATIONS: Sex: Male  Outputs:  -- CALCULATIONS: Weight in k.40 (17 @ 21:55)    ======================================================  PAST MEDICAL & SURGICAL HISTORY:  HTN (hypertension)      CAD (coronary artery disease)  ; stent      Intracranial hemorrhage        Respiratory arrest        Myocardial infarction, unspecified MI type, unspecified artery      History of coronary artery stent placement  ====================ASSESSMENT ==============  58 male with HTN, CAD (s/p PCI ), HFrEF, CVA , and T2DM presenting with chest pressure and unknown tachycardia that was shocked x1, [facility]  found to have in-stent restenosis of pLAD and  of RCA with elevated RA and PA pressures and severely decreased. Admitted to CICU for management of cardiogenic shock and ADHF requiring IABP  -, with hospital course c/b vfib arrest requiring reinsertion of IABP. Not recommended for ATs per HF. Currently listed for transplant status 2.    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 AT evaluation launched 11/10, he is ABO A. He was discussed during TSC on  and was approved for listing, however was found to be Bacteremic with  Blc Cx growing mixed cultures Staph epi and Enterococcus faecalis and started on IV Vanco. Repeat cultures from  reveal NGTD and therefore was cleared by ID for listing.     He appears adequately supported on IABP at 1:1, electrically quiescent on oral Amiodarone. Cr is improving with holding diuretics. Labs otherwise notable for worsening thrombocytopenia. Awaiting suitable donor. Now with GM + rods in blood culture on  (reported on ), restarted on vancomycin, and status 7, repeat cultures now negative x48 hours (),  now status 2 again.       ====================== NEUROLOGY=====================  Anxiety  - No Seroquel/antipsychotics since vfib arrest and prolonged QTC  - Psych Eval, recommended SSRI, but pt. refused   - Psych recommending atarax PRN  - Continue to monitor mental status    PT/Conditioning  - Continue band exercised while on bedrest s/t IABP    ==================== RESPIRATORY======================  Acute Hypoxemic Respiratory Failure  - s/p x2 intubations for cardiogenic pulm edema and the in setting of cardiac arrest, resolved - extubated 11/10  - Currently maintaining >95% sats on room air  - Continue incentive spirometry and monitoring of sp02    Asthma  - c/w albuterol, symbicort and spiriva  - On trelegy at home  - Continue to monitor SpO2 with goal >94%    ====================CARDIOVASCULAR==================  Vfib arrest i/s/o ischemia  - Lido gtt off   - PO Amio load - total of 5g per EP complete , continue PO amio  - Amio held for rising LFTs, continue to hold  - Keep K > 4, Mag > 2.2     Cardiogenic shock requiring IABP (- , -)  - Likely 2/2 NSTEMI and ADHF  -  LHC: pLAD 100 % in-stent restenosis & mRCA, 100 %. PCWP 30. IABP placed.  -  TTE: LV dilated. EF 32 %. Regional WMAs present, mod (grade 2) LV diastolic dysfunction  -  TTE: EF 22% and + LV thrombus  - IABP swapped  to RFA, continue 1:1 support  - Off Milrinone gtt @ 7:30 am   - James d/c'd due to elevated K levels  - c/w coreg 25 BID for GDMT  - HIT and HAIDER sent (12/3) as per HF   - UNOS status 2 as of   -  - reduced hydralazine 100 TID to 75 TID and ISD 40 TID to 30 TID for AL reduction    NSTEMI iso stent re-occlusion of pLAD and 100%  of RCA  - EKG on admission w/ LBBB  - DAPT: c/w ASA, Brilinta d/c'd per transplant w/u  - c/w lipitor 80  - cMR deferred given necessity of IABP  - CT sx not recommending CABG, undergoing AT eval    LV thrombus  - c/w heparin gtt w/ therapeutic PTT    ===================== RENAL =========================  Non-oliguric ARIC, resolved   - Baseline Cr: 1-  - Renal US: no evidence of renal artery stenosis  - Trend BMP, lytes daily, replace as needed  - Continue Strict I/Os, avoid nephrotoxins    Mild Hyponatremia/Hyperkalemia iso ARIC  - Continue fluid restriction   - Urea powder d/c'd per renal  - Trend daily  - Continue monitoring urine output, lytes, SCr/ BUN  - Replete lytes prn with goal K >4 and Mg >2    =============== GASTROINTESTINAL===================  Constipation/ileus, resolved  - regular diet  - bowel reg prn    ===================ENDO====================  Type 2 DM  - A1c 8.3   - Continue lantus, premeal, low ISS  - continue FS    ===================HEMATOLOGIC/ONC ===================  - H/H & plts stable  - Monitor H/H and plts  - VTE PPX: heparin gtt    ==================INFECTIOUS DISEASE================  # Positive blood culture, resolved  - Repeat BCx drawn  for leukocytosis to 12k - positive on , G+ rods in anaerobic bottle, suspect contaminant  - Repeat cultures x2 sent  per transplant ID recs - no growth to date  - Vancomycin by trough (-)  - Final microbial ID: Cutibacterium acnes, per ID this is likely to be a skin contaminant, vanc dc'd.   - Dental eval  to rule out infectious etiology that would have led to bacteremia, no significant findings on exam.     # Enterococcus faecalis bacteremia, resolved  - BCx + for enterococcus faecalis x2, Staph epi x1 (likely contaminant)- pan sensitive   - Urine cx  + enterococcus faecalis  - BCx  no growth   - IABP site swapped to RFA   - s/p Vancomycin 1g q12h (-)  - CT A/P negative for infectious pathology    # COVID, resolved  - Off airborne precautions     # Pre-transplant ID w/u   - Trend ID recs for serologies   - Colonoscopy  - normal   - Chest CT  - improved LLL aeration  - s/p immunizations    ==================INFECTIOUS DISEASE================  # Upper Extremity Rash  - Patient developed bilateral upper extremity rash after receiving Hepatitis A & B immunizations on   - Dermatology consulted  - not likely to be related to vanco, can continue per ID recs  - Recommend clobetasol 0.05% BID to affected areas   - RVP repeated to rule out viral etiology, negative      Patient requires continuous monitoring with bedside rhythm monitoring, pulse ox monitoring, and intermittent blood gas analysis. Care plan discussed with ICU care team. Patient remained critical and at risk for life threatening decompensation.  Patient seen, examined and plan discussed with CCU team during rounds.     I have personally provided ____ minutes of critical care time excluding time spent on separate procedures, in addition to initial critical care time provided by the CICU Attending, Dr. Rowe.    By signing my name below, I, Kalyn Sousa, attest that this documentation has been prepared under the direction and in the presence of KARYN Choi.  Electronically signed: Sangita Booth, 23 @ 19:51    I, Niurka Brizuela_ , personally performed the services described in this documentation. all medical record entries made by the sangita were at my direction and in my presence. I have reviewed the chart and agree that the record reflects my personal performance and is accurate and complete  Electronically signed: KARYN Choi.        Nurse's Notes                                                                                     

 [facility]                                                                

Name: Nheemiah Arora                                                                             

Age: 15 yrs                                                                                       

Sex: Male                                                                                         

: 2003                                                                                   

MRN: [id number]                                                                                   

Arrival Date: 2018                                                                          

Time: 17:15                                                                                       

Account#: [id number]                                                                            

Bed 15                                                                                            

Private MD:                                                                                       

Diagnosis: Urticaria, unspecified;Allergy, unspecified                                            

                                                                                                  

Presentation:                                                                                     

                                                                                             

17:24 Presenting complaint: Mother states: he broke into hives starting last night, and today hj  

      his eyes are puffy; denies SOB; took Benadryl last night but not helping;. Transition       

      of care: patient was not received from another setting of care. Onset: The                  

      symptoms/episode began/occurred last night. Anaphylaxis evaluation, angioedema. Onset       

      of symptoms was 2018 at 23:00. Care prior to arrival: None.                       

17:24 Method Of Arrival: Ambulatory                                                             

17:24 Acuity: JOEL 3                                                                           hj  

                                                                                                  

Triage Assessment:                                                                                

17:26 General: Appears in no apparent distress. uncomfortable, Behavior is calm, cooperative, hj  

      appropriate for age. Pain: Denies pain.                                                     

                                                                                                  

Historical:                                                                                       

- Allergies:                                                                                      

17:26 No Known Allergies;                                                                     hj  

- Home Meds:                                                                                      

17:26 None [Active];                                                                          hj  

- PMHx:                                                                                           

17:26 None;                                                                                   hj  

- PSHx:                                                                                           

17:26 None;                                                                                   hj  

                                                                                                  

- Immunization history:: Adult Immunizations up to date.                                          

- Social history:: Smoking status: Patient/guardian denies using tobacco.                         

                                                                                                  

                                                                                                  

Screenin:30 Abuse screen: Denies threats or abuse. Denies injuries from another. Nutritional        jl7 

      screening:. Tuberculosis screening: No symptoms or risk factors identified.                 

18:30 Pedi Fall Risk Total Score: 0-1 Points : Low Risk for Falls.                            jl7 

                                                                                                  

      Fall Risk Scale Score:                                                                      

18:30 Mobility: Ambulatory with no gait disturbance (0); Mentation: Developmentally           jl7 

      appropriate and alert (0); Elimination: Independent (0); Hx of Falls: No (0); Current       

      Meds: No (0); Total Score: 0                                                                

Assessment:                                                                                       

17:26 Respiratory: Airway is patent is compromised Respiratory effort is even, unlabored,     hj  

      Breath sounds are clear.                                                                    

17:40 General: Appears in no apparent distress. uncomfortable, Behavior is calm, cooperative, jl7 

      appropriate for age. Pain: Denies pain. Neuro: Level of Consciousness is awake, alert,      

      obeys commands, Oriented to person, place, time, situation. Cardiovascular: Patient's       

      skin is warm and dry. Respiratory: Airway is patent Respiratory effort is even,             

      unlabored, Respiratory pattern is regular, symmetrical, Breath sounds are clear             

      bilaterally. Denies shortness of breath. Derm: Rash noted that is itchy, urticaria, on      

      right eye and left eye.                                                                     

18:30 Reassessment: Patient and/or family updated on plan of care and expected duration. Pain jl7 

      level reassessed. Patient is alert, oriented x 3, equal unlabored respirations, skin        

      warm/dry/pink. Patient states feeling better. Patient states symptoms have improved.        

                                                                                                  

Vital Signs:                                                                                      

17:27  / 66; Pulse 60; Resp 18; Temp 98.1(TE); Pulse Ox 100% on R/A; Weight 71.21 kg;   hj  

      Height 6 ft. 0 in. (182.88 cm); Pain 0/10;                                                  

18:30  / 67; Pulse 52; Resp 16 S; Pulse Ox 100% on R/A;                                 jl7 

17:27 Body Mass Index 21.29 (71.21 kg, 182.88 cm)                                               

                                                                                                  

ED Course:                                                                                        

17:15 Patient arrived in ED.                                                                  mr  

17:26 Triage completed.                                                                       hj  

17:27 Arm band placed on right wrist.                                                         hj  

17:29 [facility] Roth[facility]C [facility]                                                        snw 

17:30 Roman Tena MD is Attending Physician.                                             snw 

17:38 Cheryl Coates RN is Primary Nurse.                                                      jl7 

17:50 Inserted saline lock: 22 gauge in right forearm, using aseptic technique.               jl7 

18:30 Patient has correct armband on for positive identification. Bed in low position. Call   jl7 

      light in reach. Side rails up X 1. Pulse ox on. NIBP on.                                    

18:52 No provider procedures requiring assistance completed. IV discontinued, intact,         jl7 

      bleeding controlled, No redness/swelling at site. Pressure dressing applied.                

                                                                                                  

Administered Medications:                                                                         

17:50 Drug: NS 0.9% 1000 ml Route: IV; Rate: 1 bolus; Site: right forearm;                    jl7 

18:45 Follow up: IV Status: Completed infusion                                                jl7 

17:51 Drug: Pepcid 20 mg Route: IVP; Site: right forearm;                                     jl7 

18:30 Follow up: Response: No adverse reaction; Marked relief of symptoms                     jl7 

17:53 Drug: SOLU-Medrol 125 mg Route: IVP; Site: right forearm;                               jl7 

18:30 Follow up: Response: No adverse reaction; Marked relief of symptoms                     jl7 

17:55 Drug: Benadryl 25 mg Route: IVP; Site: right forearm;                                   jl7 

18:30 Follow up: Response: No adverse reaction; Marked relief of symptoms                     jl7 

                                                                                                  

                                                                                                  

Outcome:                                                                                          

18:42 Discharge ordered by MD. donnelly 

18:51 Discharged to home ambulatory.                                                          jl7 

18:51 Condition: stable                                                                           

18:51 Discharge instructions given to patient, family, Instructed on discharge instructions,      

      follow up and referral plans. medication usage, Demonstrated understanding of               

      instructions, follow-up care, medications, Prescriptions given X 3.                         

18:53 Patient left the ED.                                                                    [id number] 

                                                                                                  

Signatures:                                                                                       

Tova Rothman, DARÍOC                 FNP-Brenda Hopper Henry, RN                      RN   Cheryl Chase RN                        RN   [id number]                                                  

                                                                                                  

Corrections: (The following items were deleted from the chart)                                    

17:29 17:27 Pulse 60bpm; Resp 18bpm; Pulse Ox 100% RA; Temp 98.1F Temporal; 71.21 kg; Height  hj  

      6 ft. 0 in.; BMI: 21.2; Pain 0/10; hj                                                       

                                                                                                  

************************************************************************************************** DEVORAH VALENCIA  MRN-[id number]  Patient is a 58y old  Male who presents with a chief complaint of cardiogenic shock (09 Dec 2023 06:37)    HPI:  pt seen and approx 1:30 pm  in [facility]    57yo M w/ hx HTN, CAD w/ 1 stent in , ICH () presenting with abn ekg. Patient presented to [facility] where he was found to have STEMI, recommended to get cath however patient did not want to get it there so it left and came here.  Patient initially had cough, congestion, fever, was placed on antibiotics on .  Started feeling nauseous and had a presyncopal event after which he presented to ED last night.  Had chest pain as well.  Chest pain is midsternal.  Not currently having chest pain.  Received 4 aspirin 30 min pta. (2023 15:11)      Hospital Course:    24 HOUR EVENTS:    REVIEW OF SYSTEMS:    CONSTITUTIONAL: No weakness, fevers or chills  EYES/ENT: No visual changes;  No vertigo or throat pain   NECK: No pain or stiffness  RESPIRATORY: No cough, wheezing, hemoptysis; No shortness of breath  CARDIOVASCULAR: No chest pain or palpitations  GASTROINTESTINAL: No abdominal or epigastric pain. No nausea, vomiting, or hematemesis; No diarrhea or constipation. No melena or hematochezia.  GENITOURINARY: No dysuria, frequency or hematuria  NEUROLOGICAL: No numbness or weakness  SKIN: No itching, rashes      ICU Vital Signs Last 24 Hrs  T(C): 36.7 (09 Dec 2023 17:00), Max: 37.2 (08 Dec 2023 23:00)  T(F): 98 (09 Dec 2023 17:00), Max: 99 (08 Dec 2023 23:00)  HR: 81 (09 Dec 2023 19:00) (79 - 92)  BP: --  BP(mean): --  ABP: --  ABP(mean): --  RR: 22 (09 Dec 2023 19:00) (15 - 26)  SpO2: 97% (09 Dec 2023 19:00) (93% - 99%)    O2 Parameters below as of 09 Dec 2023 19:00  Patient On (Oxygen Delivery Method): room air            CVP(mm Hg): --  CO: --  CI: --  PA: --  PA(mean): --  PA(direct): --  PCWP: --  LA: --  RA: --  SVR: --  SVRI: --  PVR: --  PVRI: --  I&O's Summary    08 Dec 2023 07:01  -  09 Dec 2023 07:00  --------------------------------------------------------  IN: 816 mL / OUT: 2100 mL / NET: -1284 mL    09 Dec 2023 07:01  -  09 Dec 2023 19:51  --------------------------------------------------------  IN: 722 mL / OUT: 700 mL / NET: 22 mL        CAPILLARY BLOOD GLUCOSE    CAPILLARY BLOOD GLUCOSE      POCT Blood Glucose.: 174 mg/dL (09 Dec 2023 17:29)      PHYSICAL EXAM:  GENERAL: No acute distress, well-developed  HEAD:  Atraumatic, Normocephalic  EYES: EOMI, PERRLA, conjunctiva and sclera clear  NECK: Supple, no lymphadenopathy, no JVD  CHEST/LUNG: CTAB; No wheezes, rales, or rhonchi  HEART: Regular rate and rhythm. Normal S1/S2. No murmurs, rubs, or gallops  ABDOMEN: Soft, non-tender, non-distended; normal bowel sounds, no organomegaly  EXTREMITIES:  2+ peripheral pulses b/l, No clubbing, cyanosis, or edema  NEUROLOGY: A&O x 3, no focal deficits  SKIN: No rashes or lesions    ============================I/O===========================   I&O's Detail    08 Dec 2023 07:01  -  09 Dec 2023 07:00  --------------------------------------------------------  IN:    Heparin: 336 mL    Oral Fluid: 480 mL  Total IN: 816 mL    OUT:    Voided (mL): 2100 mL  Total OUT: 2100 mL    Total NET: -1284 mL      09 Dec 2023 07:01  -  09 Dec 2023 19:51  --------------------------------------------------------  IN:    Heparin: 182 mL    Oral Fluid: 540 mL  Total IN: 722 mL    OUT:    Voided (mL): 700 mL  Total OUT: 700 mL    Total NET: 22 mL        ============================ LABS =========================                        11.6   8.08  )-----------( 131      ( 09 Dec 2023 00:59 )             35.9         133<L>  |  102  |  34<H>  ----------------------------<  222<H>  4.6   |  19<L>  |  1.27    Ca    9.8      09 Dec 2023 00:59  Phos  3.5       Mg     1.9         TPro  6.7  /  Alb  3.9  /  TBili  0.2  /  DBili  x   /  AST  45<H>  /  ALT  116<H>  /  AlkPhos  61                  LIVER FUNCTIONS - ( 09 Dec 2023 00:59 )  Alb: 3.9 g/dL / Pro: 6.7 g/dL / ALK PHOS: 61 U/L / ALT: 116 U/L / AST: 45 U/L / GGT: x           PT/INR - ( 09 Dec 2023 00:59 )   PT: 11.2 sec;   INR: 1.07 ratio         PTT - ( 09 Dec 2023 02:28 )  PTT:80.3 sec    Lactate, Blood: 1.3 mmol/L (23 @ 00:59)  Lactate, Blood: 1.5 mmol/L (23 @ 01:22)  Lactate, Blood: 1.7 mmol/L (23 @ 00:58)    Urinalysis Basic - ( 09 Dec 2023 00:59 )    Color: x / Appearance: x / SG: x / pH: x  Gluc: 222 mg/dL / Ketone: x  / Bili: x / Urobili: x   Blood: x / Protein: x / Nitrite: x   Leuk Esterase: x / RBC: x / WBC x   Sq Epi: x / Non Sq Epi: x / Bacteria: x      ======================Micro/Rad/Cardio=================  Telemtry: Reviewed   EKG: Reviewed  CXR: Reviewed  Culture: Reviewed   Echo:   Cath: Cardiac Cath Lab - Adult:   [facility]  Department of Cardiology  [address] [place] [address]  [phone]  Cath Lab Report -- Comprehensive Report  Patient: LD VALENCIA  Study date: 2017  Account number: [id number]  MR number: [id number]  : 1965  Gender: Male  Race: W  Case Physician(s):  Jairon Holguin M.D.  Referring Physician:  Luc Lynn M.D.  INDICATIONS: Unstable angina - CCS4.  HISTORY: The patient has a history of coronary artery disease. The patient  hashypertension and medication-treated dyslipidemia.  PROCEDURE:  --  Left heart catheterization with ventriculography.  --  Left coronary angiography.  --  Right coronary angiography.  TECHNIQUE: The risks and alternatives of the procedures and conscious  sedation were explained to the patient and informed consent was obtained.  Cardiac catheterization performed electively.  Local anesthetic given. Right radial artery access. A 6FR PRELUDE KIT was  inserted in the vessel. Left heart catheterization. Ventriculography was  performed. A 5FR FR4.0 EXPO catheter was utilized. Left coronary artery  angiography. The vessel was injected utilizing a 5FR FL3.5 EXPO catheter.  Right coronary artery angiography. The vessel was injected utilizing a 5FR  FR4.0 EXPO catheter. RADIATION EXPOSURE: 1.1 min.  CONTRAST GIVEN: Omnipaque 55 ml.  MEDICATIONS GIVEN: Midazolam, 1 mg, IV. Fentanyl, 25 mcg, IV. Verapamil  (Isoptin, Calan, Covera), 2.5 mg, IA. Heparin, 3000 units, IA.  VENTRICLES: Global left ventricular function was moderately depressed. EF  estimated was 40 %.  CORONARY VESSELS: The coronary circulation is right dominant.  LM:   --  LM: Normal.  LAD:   --  Proximal LAD: There was a 50 % stenosis.  CX:   --  Circumflex: Normal.  RCA:   --  Mid RCA: There was a 40 % stenosis.  --  Distal RCA: There was a 50 % stenosis.  COMPLICATIONS: There were no complications.  DIAGNOSTIC RECOMMENDATIONS: The patient should continue with the present  medications.  Prepared and signed by  Jairon Holguin M.D.  Signed 2017 12:20:13  HEMODYNAMIC TABLES  Pressures:  Baseline/ Room Air  Pressures:  - HR: 78  Pressures:  - Rhythm:  Pressures:  -- Aortic Pressure (S/D/M): --/--/99  Pressures:  -- Left Ventricle (s/edp): 157/39/--  Outputs:  Baseline/ Room Air  Outputs:  -- CALCULATIONS[age]  Outputs:  -- CALCULATIONS: Body Surface Area: 2.05  Outputs:  -- CALCULATIONS: Height in cm: 175.00  Outputs:  -- CALCULATIONS: Sex: Male  Outputs:  -- CALCULATIONS: Weight in k.40 (17 @ 21:55)    ======================================================  PAST MEDICAL & SURGICAL HISTORY:  HTN (hypertension)      CAD (coronary artery disease)  ; stent      Intracranial hemorrhage        Respiratory arrest        Myocardial infarction, unspecified MI type, unspecified artery      History of coronary artery stent placement  ====================ASSESSMENT ==============  58 male with HTN, CAD (s/p PCI ), HFrEF, CVA , and T2DM presenting with chest pressure and unknown tachycardia that was shocked x1, [facility]  found to have in-stent restenosis of pLAD and  of RCA with elevated RA and PA pressures and severely decreased. Admitted to CICU for management of cardiogenic shock and ADHF requiring IABP  -, with hospital course c/b vfib arrest requiring reinsertion of IABP. Not recommended for ATs per HF. Currently listed for transplant status 2.    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 AT evaluation launched 11/10, he is ABO A. He was discussed during TSC on  and was approved for listing, however was found to be Bacteremic with  Blc Cx growing mixed cultures Staph epi and Enterococcus faecalis and started on IV Vanco. Repeat cultures from  reveal NGTD and therefore was cleared by ID for listing.     He appears adequately supported on IABP at 1:1, electrically quiescent on oral Amiodarone. Cr is improving with holding diuretics. Labs otherwise notable for worsening thrombocytopenia. Awaiting suitable donor. Now with GM + rods in blood culture on  (reported on ), restarted on vancomycin, and status 7, repeat cultures now negative x48 hours (),  now status 2 again.       ====================== NEUROLOGY=====================  Anxiety  - No Seroquel/antipsychotics since vfib arrest and prolonged QTC  - Psych Eval, recommended SSRI, but pt. refused   - Psych recommending atarax PRN  - Continue to monitor mental status    PT/Conditioning  - Continue band exercised while on bedrest s/t IABP    ==================== RESPIRATORY======================  Acute Hypoxemic Respiratory Failure  - s/p x2 intubations for cardiogenic pulm edema and the in setting of cardiac arrest, resolved - extubated 11/10  - Currently maintaining >95% sats on room air  - Continue incentive spirometry and monitoring of sp02    Asthma  - c/w albuterol, symbicort and spiriva  - On trelegy at home  - Continue to monitor SpO2 with goal >94%    ====================CARDIOVASCULAR==================  Vfib arrest i/s/o ischemia  - Lido gtt off   - PO Amio load - total of 5g per EP complete , continue PO amio  - Amio held for rising LFTs, continue to hold  - Keep K > 4, Mag > 2.2     Cardiogenic shock requiring IABP (- , -)  - Likely 2/2 NSTEMI and ADHF  -  LHC: pLAD 100 % in-stent restenosis & mRCA, 100 %. PCWP 30. IABP placed.  -  TTE: LV dilated. EF 32 %. Regional WMAs present, mod (grade 2) LV diastolic dysfunction  -  TTE: EF 22% and + LV thrombus  - IABP swapped  to RFA, continue 1:1 support  - Off Milrinone gtt @ 7:30 am   - James d/c'd due to elevated K levels  - c/w coreg 25 BID for GDMT  - HIT and HAIDER sent (12/3) as per HF   - UNOS status 2 as of   -  - reduced hydralazine 100 TID to 75 TID and ISD 40 TID to 30 TID for AL reduction    NSTEMI iso stent re-occlusion of pLAD and 100%  of RCA  - EKG on admission w/ LBBB  - DAPT: c/w ASA, Brilinta d/c'd per transplant w/u  - c/w lipitor 80  - cMR deferred given necessity of IABP  - CT sx not recommending CABG, undergoing AT eval    LV thrombus  - c/w heparin gtt w/ therapeutic PTT    ===================== RENAL =========================  Non-oliguric ARIC, resolved   - Baseline Cr: 1-  - Renal US: no evidence of renal artery stenosis  - Trend BMP, lytes daily, replace as needed  - Continue Strict I/Os, avoid nephrotoxins    Mild Hyponatremia/Hyperkalemia iso ARIC  - Continue fluid restriction   - Urea powder d/c'd per renal  - Trend daily  - Continue monitoring urine output, lytes, SCr/ BUN  - Replete lytes prn with goal K >4 and Mg >2    =============== GASTROINTESTINAL===================  Constipation/ileus, resolved  - regular diet  - bowel reg prn    ===================ENDO====================  Type 2 DM  - A1c 8.3   - Continue lantus, premeal, low ISS  - continue FS    ===================HEMATOLOGIC/ONC ===================  - H/H & plts stable  - Monitor H/H and plts  - VTE PPX: heparin gtt    ==================INFECTIOUS DISEASE================  # Positive blood culture, resolved  - Repeat BCx drawn  for leukocytosis to 12k - positive on , G+ rods in anaerobic bottle, suspect contaminant  - Repeat cultures x2 sent  per transplant ID recs - no growth to date  - Vancomycin by trough (-)  - Final microbial ID: Cutibacterium acnes, per ID this is likely to be a skin contaminant, vanc dc'd.   - Dental eval  to rule out infectious etiology that would have led to bacteremia, no significant findings on exam.     # Enterococcus faecalis bacteremia, resolved  - BCx + for enterococcus faecalis x2, Staph epi x1 (likely contaminant)- pan sensitive   - Urine cx  + enterococcus faecalis  - BCx  no growth   - IABP site swapped to RFA   - s/p Vancomycin 1g q12h (-)  - CT A/P negative for infectious pathology    # COVID, resolved  - Off airborne precautions     # Pre-transplant ID w/u   - Trend ID recs for serologies   - Colonoscopy  - normal   - Chest CT  - improved LLL aeration  - s/p immunizations    ==================INFECTIOUS DISEASE================  # Upper Extremity Rash  - Patient developed bilateral upper extremity rash after receiving Hepatitis A & B immunizations on   - Dermatology consulted  - not likely to be related to vanco, can continue per ID recs  - Recommend clobetasol 0.05% BID to affected areas   - RVP repeated to rule out viral etiology, negative      Patient requires continuous monitoring with bedside rhythm monitoring, pulse ox monitoring, and intermittent blood gas analysis. Care plan discussed with ICU care team. Patient remained critical and at risk for life threatening decompensation.  Patient seen, examined and plan discussed with CCU team during rounds.     I have personally provided ____ minutes of critical care time excluding time spent on separate procedures, in addition to initial critical care time provided by the CICU Attending, Dr. Rowe.    By signing my name below, I, Kalyn Sousa, attest that this documentation has been prepared under the direction and in the presence of KARYN Choi.  Electronically signed: Sangita Booth, 23 @ 19:51    I, Niurka Brizuela_ , personally performed the services described in this documentation. all medical record entries made by the sangita were at my direction and in my presence. I have reviewed the chart and agree that the record reflects my personal performance and is accurate and complete  Electronically signed: KARYN Choi.        DEVORAH VALENCIA  MRN-[id number]  Patient is a 58y old  Male who presents with a chief complaint of cardiogenic shock (09 Dec 2023 06:37)    HPI:  pt seen and approx 1:30 pm  in [facility]    57yo M w/ hx HTN, CAD w/ 1 stent in , ICH () presenting with abn ekg. Patient presented to [facility] where he was found to have STEMI, recommended to get cath however patient did not want to get it there so it left and came here.  Patient initially had cough, congestion, fever, was placed on antibiotics on .  Started feeling nauseous and had a presyncopal event after which he presented to ED last night.  Had chest pain as well.  Chest pain is midsternal.  Not currently having chest pain.  Received 4 aspirin 30 min pta. (2023 15:11)      24 HOUR EVENTS:  - no acute events today  - IABP 1:1    REVIEW OF SYSTEMS:  CONSTITUTIONAL: No weakness, fevers or chills  EYES/ENT: No visual changes;  No vertigo or throat pain   NECK: No pain or stiffness  RESPIRATORY: No cough, wheezing, hemoptysis; No shortness of breath  CARDIOVASCULAR: No chest pain or palpitations  GASTROINTESTINAL: No abdominal or epigastric pain. No nausea, vomiting, or hematemesis; No diarrhea or constipation. No melena or hematochezia.  GENITOURINARY: No dysuria, frequency or hematuria  NEUROLOGICAL: No numbness or weakness  SKIN: No itching, rashes      ICU Vital Signs Last 24 Hrs  T(C): 36.7 (09 Dec 2023 17:00), Max: 37.2 (08 Dec 2023 23:00)  T(F): 98 (09 Dec 2023 17:00), Max: 99 (08 Dec 2023 23:00)  HR: 81 (09 Dec 2023 19:00) (79 - 92)  BP: --  BP(mean): --  ABP: --  ABP(mean): --  RR: 22 (09 Dec 2023 19:00) (15 - 26)  SpO2: 97% (09 Dec 2023 19:00) (93% - 99%)    O2 Parameters below as of 09 Dec 2023 19:00  Patient On (Oxygen Delivery Method): room air            CVP(mm Hg): --  CO: --  CI: --  PA: --  PA(mean): --  PA(direct): --  PCWP: --  LA: --  RA: --  SVR: --  SVRI: --  PVR: --  PVRI: --  I&O's Summary    08 Dec 2023 07:01  -  09 Dec 2023 07:00  --------------------------------------------------------  IN: 816 mL / OUT: 2100 mL / NET: -1284 mL    09 Dec 2023 07:  -  09 Dec 2023 19:51  --------------------------------------------------------  IN: 722 mL / OUT: 700 mL / NET: 22 mL        CAPILLARY BLOOD GLUCOSE    CAPILLARY BLOOD GLUCOSE      POCT Blood Glucose.: 174 mg/dL (09 Dec 2023 17:29)      PHYSICAL EXAM:  GENERAL: No acute distress, well-developed  HEAD:  Atraumatic, Normocephalic  EYES: EOMI, PERRLA, conjunctiva and sclera clear  NECK: Supple, no lymphadenopathy, no JVD  CHEST/LUNG: CTAB; No wheezes, rales, or rhonchi  HEART: Regular rate and rhythm. Normal S1/S2. No murmurs, rubs, or gallops  ABDOMEN: Soft, non-tender, non-distended; normal bowel sounds, no organomegaly  EXTREMITIES:  2+ peripheral pulses b/l, No clubbing, cyanosis, or edema. RF IABP soft, dry, intact, nontender  NEUROLOGY: A&O x 3, no focal deficits  SKIN: No rashes or lesions    ============================I/O===========================   I&O's Detail    08 Dec 2023 07:01  -  09 Dec 2023 07:00  --------------------------------------------------------  IN:    Heparin: 336 mL    Oral Fluid: 480 mL  Total IN: 816 mL    OUT:    Voided (mL): 2100 mL  Total OUT: 2100 mL    Total NET: -1284 mL      09 Dec 2023 07:01  -  09 Dec 2023 19:51  --------------------------------------------------------  IN:    Heparin: 182 mL    Oral Fluid: 540 mL  Total IN: 722 mL    OUT:    Voided (mL): 700 mL  Total OUT: 700 mL    Total NET: 22 mL        ============================ LABS =========================                        11.6   8.08  )-----------( 131      ( 09 Dec 2023 00:59 )             35.9     12-    133<L>  |  102  |  34<H>  ----------------------------<  222<H>  4.6   |  19<L>  |  1.27    Ca    9.8      09 Dec 2023 00:59  Phos  3.5       Mg     1.9         TPro  6.7  /  Alb  3.9  /  TBili  0.2  /  DBili  x   /  AST  45<H>  /  ALT  116<H>  /  AlkPhos  61  12                LIVER FUNCTIONS - ( 09 Dec 2023 00:59 )  Alb: 3.9 g/dL / Pro: 6.7 g/dL / ALK PHOS: 61 U/L / ALT: 116 U/L / AST: 45 U/L / GGT: x           PT/INR - ( 09 Dec 2023 00:59 )   PT: 11.2 sec;   INR: 1.07 ratio         PTT - ( 09 Dec 2023 02:28 )  PTT:80.3 sec    Lactate, Blood: 1.3 mmol/L (23 @ 00:59)  Lactate, Blood: 1.5 mmol/L (23 @ 01:22)  Lactate, Blood: 1.7 mmol/L (23 @ 00:58)    Urinalysis Basic - ( 09 Dec 2023 00:59 )    Color: x / Appearance: x / SG: x / pH: x  Gluc: 222 mg/dL / Ketone: x  / Bili: x / Urobili: x   Blood: x / Protein: x / Nitrite: x   Leuk Esterase: x / RBC: x / WBC x   Sq Epi: x / Non Sq Epi: x / Bacteria: x      ======================Micro/Rad/Cardio=================  Telemtry: Reviewed   EKG: Reviewed  CXR: Reviewed  Culture: Reviewed   Echo:   Cath: Cardiac Cath Lab - Adult:   [facility]  Department of Cardiology  [address] [place] [address]  [phone]  Cath Lab Report -- Comprehensive Report  Patient: LD VALENCIA  Study date: 2017  Account number: [id number]  MR number: [id number]  : 1965  Gender: Male  Race: W  Case Physician(s):  Jairon Holguin M.D.  Referring Physician:  Luc Lynn M.D.  INDICATIONS: Unstable angina - CCS4.  HISTORY: The patient has a history of coronary artery disease. The patient  hashypertension and medication-treated dyslipidemia.  PROCEDURE:  --  Left heart catheterization with ventriculography.  --  Left coronary angiography.  --  Right coronary angiography.  TECHNIQUE: The risks and alternatives of the procedures and conscious  sedation were explained to the patient and informed consent was obtained.  Cardiac catheterization performed electively.  Local anesthetic given. Right radial artery access. A 6FR PRELUDE KIT was  inserted in the vessel. Left heart catheterization. Ventriculography was  performed. A 5FR FR4.0 EXPO catheter was utilized. Left coronary artery  angiography. The vessel was injected utilizing a 5FR FL3.5 EXPO catheter.  Right coronary artery angiography. The vessel was injected utilizing a 5FR  FR4.0 EXPO catheter. RADIATION EXPOSURE: 1.1 min.  CONTRAST GIVEN: Omnipaque 55 ml.  MEDICATIONS GIVEN: Midazolam, 1 mg, IV. Fentanyl, 25 mcg, IV. Verapamil  (Isoptin, Calan, Covera), 2.5 mg, IA. Heparin, 3000 units, IA.  VENTRICLES: Global left ventricular function was moderately depressed. EF  estimated was 40 %.  CORONARY VESSELS: The coronary circulation is right dominant.  LM:   --  LM: Normal.  LAD:   --  Proximal LAD: There was a 50 % stenosis.  CX:   --  Circumflex: Normal.  RCA:   --  Mid RCA: There was a 40 % stenosis.  --  Distal RCA: There was a 50 % stenosis.  COMPLICATIONS: There were no complications.  DIAGNOSTIC RECOMMENDATIONS: The patient should continue with the present  medications.  Prepared and signed by  Jairon Holguin M.D.  Signed 2017 12:20:13  HEMODYNAMIC TABLES  Pressures:  Baseline/ Room Air  Pressures:  - HR: 78  Pressures:  - Rhythm:  Pressures:  -- Aortic Pressure (S/D/M): --/--/99  Pressures:  -- Left Ventricle (s/edp): 157/39/--  Outputs:  Baseline/ Room Air  Outputs:  -- CALCULATIONS: Age in years: 51.41  Outputs:  -- CALCULATIONS: Body Surface Area: 2.05  Outputs:  -- CALCULATIONS: Height in cm: 175.00  Outputs:  -- CALCULATIONS: Sex: Male  Outputs:  -- CALCULATIONS: Weight in k.40 (17 @ 21:55)    ======================================================  PAST MEDICAL & SURGICAL HISTORY:  HTN (hypertension)      CAD (coronary artery disease)  ; stent      Intracranial hemorrhage        Respiratory arrest        Myocardial infarction, unspecified MI type, unspecified artery      History of coronary artery stent placement  ====================ASSESSMENT ==============  58 male with HTN, CAD (s/p PCI ), HFrEF, CVA , and T2DM presenting with chest pressure and unknown tachycardia that was shocked x1, [facility]  found to have in-stent restenosis of pLAD and  of RCA with elevated RA and PA pressures and severely decreased. Admitted to CICU for management of cardiogenic shock and ADHF requiring IABP  -, with hospital course c/b vfib arrest requiring reinsertion of IABP. Not recommended for ATs per HF. Currently listed for transplant status 2.    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 AT evaluation launched 11/10, he is ABO A. He was discussed during TSC on  and was approved for listing, however was found to be Bacteremic with  Blc Cx growing mixed cultures Staph epi and Enterococcus faecalis and started on IV Vanco. Repeat cultures from  reveal NGTD and therefore was cleared by ID for listing.     He appears adequately supported on IABP at 1:1, electrically quiescent on oral Amiodarone. Cr is improving with holding diuretics. Labs otherwise notable for worsening thrombocytopenia. Awaiting suitable donor. Now with GM + rods in blood culture on  (reported on ), restarted on vancomycin, and status 7, repeat cultures now negative x48 hours (),  now status 2 again.       ====================== NEUROLOGY=====================  Anxiety  - No Seroquel/antipsychotics since vfib arrest and prolonged QTC  - Psych Eval, recommended SSRI, but pt. refused   - Psych recommending atarax PRN  - Continue to monitor mental status    PT/Conditioning  - Continue band exercised while on bedrest s/t IABP    ==================== RESPIRATORY======================  Acute Hypoxemic Respiratory Failure  - s/p x2 intubations for cardiogenic pulm edema and the in setting of cardiac arrest, resolved - extubated 11/10  - Currently maintaining >95% sats on room air  - Continue incentive spirometry and monitoring of sp02    Asthma  - c/w albuterol, symbicort and spiriva  - On trelegy at home  - Continue to monitor SpO2 with goal >94%    ====================CARDIOVASCULAR==================  Vfib arrest i/s/o ischemia  - Lido gtt off   - PO Amio load - total of 5g per EP complete , continue PO amio  - Amio held for rising LFTs, continue to hold  - Keep K > 4, Mag > 2.2     Cardiogenic shock requiring IABP (- , -)  - Likely 2/2 NSTEMI and ADHF  -  LHC: pLAD 100 % in-stent restenosis & mRCA, 100 %. PCWP 30. IABP placed.  -  TTE: LV dilated. EF 32 %. Regional WMAs present, mod (grade 2) LV diastolic dysfunction  -  TTE: EF 22% and + LV thrombus  - IABP swapped  to RFA, continue 1:1 support  - Off Milrinone gtt @ 7:30 am   - [place] d/c'd due to elevated K levels  - c/w coreg 25 BID for GDMT  - HIT and HAIDER sent (12/3) as per HF   - UNOS status 2 as of   -  - reduced hydralazine 100 TID to 75 TID and ISD 40 TID to 30 TID for AL reduction    NSTEMI iso stent re-occlusion of pLAD and 100%  of RCA  - EKG on admission w/ LBBB  - DAPT: c/w ASA, Brilinta d/c'd per transplant w/u  - c/w lipitor 80  - cMR deferred given necessity of IABP  - CT sx not recommending CABG, undergoing AT eval    LV thrombus  - c/w heparin gtt w/ therapeutic PTT    ===================== RENAL =========================  Non-oliguric ARIC, resolved   - Baseline Cr: -  - Renal US: no evidence of renal artery stenosis  - Trend BMP, lytes daily, replace as needed  - Continue Strict I/Os, avoid nephrotoxins    Mild Hyponatremia/Hyperkalemia iso ARIC  - Continue fluid restriction   - Urea powder d/c'd per renal  - Trend daily  - Continue monitoring urine output, lytes, SCr/ BUN  - Replete lytes prn with goal K >4 and Mg >2    =============== GASTROINTESTINAL===================  Constipation/ileus, resolved  - regular diet  - bowel reg prn    ===================ENDO====================  Type 2 DM  - A1c 8.3   - Continue lantus, premeal, low ISS  - continue FS    ===================HEMATOLOGIC/ONC ===================  - H/H & plts stable  - Monitor H/H and plts  - VTE PPX: heparin gtt    ==================INFECTIOUS DISEASE================  # Positive blood culture, resolved  - Repeat BCx drawn  for leukocytosis to 12k - positive on , G+ rods in anaerobic bottle, suspect contaminant  - Repeat cultures x2 sent  per transplant ID recs - no growth to date  - Vancomycin by trough (-)  - Final microbial ID: Cutibacterium acnes, per ID this is likely to be a skin contaminant, vanc dc'd.   - Dental eval  to rule out infectious etiology that would have led to bacteremia, no significant findings on exam.     # Enterococcus faecalis bacteremia, resolved  - BCx + for enterococcus faecalis x2, Staph epi x1 (likely contaminant)- pan sensitive   - Urine cx  + enterococcus faecalis  - BCx  no growth   - IABP site swapped to RFA   - s/p Vancomycin 1g q12h (-)  - CT A/P negative for infectious pathology    # COVID, resolved  - Off airborne precautions     # Pre-transplant ID w/u   - Trend ID recs for serologies   - Colonoscopy  - normal   - Chest CT  - improved LLL aeration  - s/p immunizations    ==================INFECTIOUS DISEASE================  # Upper Extremity Rash  - Patient developed bilateral upper extremity rash after receiving Hepatitis A & B immunizations on   - Dermatology consulted  - not likely to be related to vanco, can continue per ID recs  - Recommend clobetasol 0.05% BID to affected areas   - RVP repeated to rule out viral etiology, negative      Patient requires continuous monitoring with bedside rhythm monitoring, pulse ox monitoring, and intermittent blood gas analysis. Care plan discussed with ICU care team. Patient remained critical and at risk for life threatening decompensation.  Patient seen, examined and plan discussed with CCU team during rounds.     I have personally provided 35 minutes of critical care time excluding time spent on separate procedures, in addition to initial critical care time provided by the CICU Attending, Dr. Rowe.    By signing my name below, I, Kalyn Sousa, attest that this documentation has been prepared under the direction and in the presence of KARYN Choi.  Electronically signed: Sangita Booth, 23 @ 19:51    I, Niurka Brizuela_ , personally performed the services described in this documentation. all medical record entries made by the sangita were at my direction and in my presence. I have reviewed the chart and agree that the record reflects my personal performance and is accurate and complete  Electronically signed: KARYN Choi.        DEVORAH VALENCIA  MRN-[id number]  Patient is a 58y old  Male who presents with a chief complaint of cardiogenic shock (09 Dec 2023 06:37)    HPI:  pt seen and approx 1:30 pm  in [facility]    57yo M w/ hx HTN, CAD w/ 1 stent in , ICH () presenting with abn ekg. Patient presented to [facility] where he was found to have STEMI, recommended to get cath however patient did not want to get it there so it left and came here.  Patient initially had cough, congestion, fever, was placed on antibiotics on .  Started feeling nauseous and had a presyncopal event after which he presented to ED last night.  Had chest pain as well.  Chest pain is midsternal.  Not currently having chest pain.  Received 4 aspirin 30 min pta. (2023 15:11)      24 HOUR EVENTS:  - no acute events today  - IABP 1:1    REVIEW OF SYSTEMS:  CONSTITUTIONAL: No weakness, fevers or chills  EYES/ENT: No visual changes;  No vertigo or throat pain   NECK: No pain or stiffness  RESPIRATORY: No cough, wheezing, hemoptysis; No shortness of breath  CARDIOVASCULAR: No chest pain or palpitations  GASTROINTESTINAL: No abdominal or epigastric pain. No nausea, vomiting, or hematemesis; No diarrhea or constipation. No melena or hematochezia.  GENITOURINARY: No dysuria, frequency or hematuria  NEUROLOGICAL: No numbness or weakness  SKIN: No itching, rashes      ICU Vital Signs Last 24 Hrs  T(C): 36.7 (09 Dec 2023 17:00), Max: 37.2 (08 Dec 2023 23:00)  T(F): 98 (09 Dec 2023 17:00), Max: 99 (08 Dec 2023 23:00)  HR: 81 (09 Dec 2023 19:00) (79 - 92)  BP: --  BP(mean): --  ABP: --  ABP(mean): --  RR: 22 (09 Dec 2023 19:00) (15 - 26)  SpO2: 97% (09 Dec 2023 19:00) (93% - 99%)    O2 Parameters below as of 09 Dec 2023 19:00  Patient On (Oxygen Delivery Method): room air            CVP(mm Hg): --  CO: --  CI: --  PA: --  PA(mean): --  PA(direct): --  PCWP: --  LA: --  RA: --  SVR: --  SVRI: --  PVR: --  PVRI: --  I&O's Summary    08 Dec 2023 07:01  -  09 Dec 2023 07:00  --------------------------------------------------------  IN: 816 mL / OUT: 2100 mL / NET: -1284 mL    09 Dec 2023 07:  -  09 Dec 2023 19:51  --------------------------------------------------------  IN: 722 mL / OUT: 700 mL / NET: 22 mL        CAPILLARY BLOOD GLUCOSE    CAPILLARY BLOOD GLUCOSE      POCT Blood Glucose.: 174 mg/dL (09 Dec 2023 17:29)      PHYSICAL EXAM:  GENERAL: No acute distress, well-developed  HEAD:  Atraumatic, Normocephalic  EYES: EOMI, PERRLA, conjunctiva and sclera clear  NECK: Supple, no lymphadenopathy, no JVD  CHEST/LUNG: CTAB; No wheezes, rales, or rhonchi  HEART: Regular rate and rhythm. Normal S1/S2. No murmurs, rubs, or gallops  ABDOMEN: Soft, non-tender, non-distended; normal bowel sounds, no organomegaly  EXTREMITIES:  2+ peripheral pulses b/l, No clubbing, cyanosis, or edema. RF IABP soft, dry, intact, nontender  NEUROLOGY: A&O x 3, no focal deficits  SKIN: No rashes or lesions    ============================I/O===========================   I&O's Detail    08 Dec 2023 07:01  -  09 Dec 2023 07:00  --------------------------------------------------------  IN:    Heparin: 336 mL    Oral Fluid: 480 mL  Total IN: 816 mL    OUT:    Voided (mL): 2100 mL  Total OUT: 2100 mL    Total NET: -1284 mL      09 Dec 2023 07:01  -  09 Dec 2023 19:51  --------------------------------------------------------  IN:    Heparin: 182 mL    Oral Fluid: 540 mL  Total IN: 722 mL    OUT:    Voided (mL): 700 mL  Total OUT: 700 mL    Total NET: 22 mL        ============================ LABS =========================                        11.6   8.08  )-----------( 131      ( 09 Dec 2023 00:59 )             35.9     12-    133<L>  |  102  |  34<H>  ----------------------------<  222<H>  4.6   |  19<L>  |  1.27    Ca    9.8      09 Dec 2023 00:59  Phos  3.5       Mg     1.9         TPro  6.7  /  Alb  3.9  /  TBili  0.2  /  DBili  x   /  AST  45<H>  /  ALT  116<H>  /  AlkPhos  61  12                LIVER FUNCTIONS - ( 09 Dec 2023 00:59 )  Alb: 3.9 g/dL / Pro: 6.7 g/dL / ALK PHOS: 61 U/L / ALT: 116 U/L / AST: 45 U/L / GGT: x           PT/INR - ( 09 Dec 2023 00:59 )   PT: 11.2 sec;   INR: 1.07 ratio         PTT - ( 09 Dec 2023 02:28 )  PTT:80.3 sec    Lactate, Blood: 1.3 mmol/L (23 @ 00:59)  Lactate, Blood: 1.5 mmol/L (23 @ 01:22)  Lactate, Blood: 1.7 mmol/L (23 @ 00:58)    Urinalysis Basic - ( 09 Dec 2023 00:59 )    Color: x / Appearance: x / SG: x / pH: x  Gluc: 222 mg/dL / Ketone: x  / Bili: x / Urobili: x   Blood: x / Protein: x / Nitrite: x   Leuk Esterase: x / RBC: x / WBC x   Sq Epi: x / Non Sq Epi: x / Bacteria: x      ======================Micro/Rad/Cardio=================  Telemtry: Reviewed   EKG: Reviewed  CXR: Reviewed  Culture: Reviewed   Echo:   Cath: Cardiac Cath Lab - Adult:   [facility]  Department of Cardiology  [address] [place] [address]  [phone]  Cath Lab Report -- Comprehensive Report  Patient: LD VALENCIA  Study date: 2017  Account number: [id number]  MR number: [id number]  : 1965  Gender: Male  Race: W  Case Physician(s):  Jairon Holguin M.D.  Referring Physician:  Luc Lynn M.D.  INDICATIONS: Unstable angina - CCS4.  HISTORY: The patient has a history of coronary artery disease. The patient  hashypertension and medication-treated dyslipidemia.  PROCEDURE:  --  Left heart catheterization with ventriculography.  --  Left coronary angiography.  --  Right coronary angiography.  TECHNIQUE: The risks and alternatives of the procedures and conscious  sedation were explained to the patient and informed consent was obtained.  Cardiac catheterization performed electively.  Local anesthetic given. Right radial artery access. A 6FR PRELUDE KIT was  inserted in the vessel. Left heart catheterization. Ventriculography was  performed. A 5FR FR4.0 EXPO catheter was utilized. Left coronary artery  angiography. The vessel was injected utilizing a 5FR FL3.5 EXPO catheter.  Right coronary artery angiography. The vessel was injected utilizing a 5FR  FR4.0 EXPO catheter. RADIATION EXPOSURE: 1.1 min.  CONTRAST GIVEN: Omnipaque 55 ml.  MEDICATIONS GIVEN: Midazolam, 1 mg, IV. Fentanyl, 25 mcg, IV. Verapamil  (Isoptin, Calan, Covera), 2.5 mg, IA. Heparin, 3000 units, IA.  VENTRICLES: Global left ventricular function was moderately depressed. EF  estimated was 40 %.  CORONARY VESSELS: The coronary circulation is right dominant.  LM:   --  LM: Normal.  LAD:   --  Proximal LAD: There was a 50 % stenosis.  CX:   --  Circumflex: Normal.  RCA:   --  Mid RCA: There was a 40 % stenosis.  --  Distal RCA: There was a 50 % stenosis.  COMPLICATIONS: There were no complications.  DIAGNOSTIC RECOMMENDATIONS: The patient should continue with the present  medications.  Prepared and signed by  Jairon Holguin M.D.  Signed 2017 12:20:13  HEMODYNAMIC TABLES  Pressures:  Baseline/ Room Air  Pressures:  - HR: 78  Pressures:  - Rhythm:  Pressures:  -- Aortic Pressure (S/D/M): --/--/99  Pressures:  -- Left Ventricle (s/edp): 157/39/--  Outputs:  Baseline/ Room Air  Outputs:  -- CALCULATIONS[age]  Outputs:  -- CALCULATIONS: Body Surface Area: 2.05  Outputs:  -- CALCULATIONS: Height in cm: 175.00  Outputs:  -- CALCULATIONS: Sex: Male  Outputs:  -- CALCULATIONS: Weight in k.40 (17 @ 21:55)    ======================================================  PAST MEDICAL & SURGICAL HISTORY:  HTN (hypertension)      CAD (coronary artery disease)  ; stent      Intracranial hemorrhage        Respiratory arrest        Myocardial infarction, unspecified MI type, unspecified artery      History of coronary artery stent placement  ====================ASSESSMENT ==============  58 male with HTN, CAD (s/p PCI ), HFrEF, CVA , and T2DM presenting with chest pressure and unknown tachycardia that was shocked x1, [facility]  found to have in-stent restenosis of pLAD and  of RCA with elevated RA and PA pressures and severely decreased. Admitted to CICU for management of cardiogenic shock and ADHF requiring IABP  -, with hospital course c/b vfib arrest requiring reinsertion of IABP. Not recommended for ATs per HF. Currently listed for transplant status 2.    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 AT evaluation launched 11/10, he is ABO A. He was discussed during TSC on  and was approved for listing, however was found to be Bacteremic with  Blc Cx growing mixed cultures Staph epi and Enterococcus faecalis and started on IV Vanco. Repeat cultures from  reveal NGTD and therefore was cleared by ID for listing.     He appears adequately supported on IABP at 1:1, electrically quiescent on oral Amiodarone. Cr is improving with holding diuretics. Labs otherwise notable for worsening thrombocytopenia. Awaiting suitable donor. Now with GM + rods in blood culture on  (reported on ), restarted on vancomycin, and status 7, repeat cultures now negative x48 hours (),  now status 2 again.       ====================== NEUROLOGY=====================  Anxiety  - No Seroquel/antipsychotics since vfib arrest and prolonged QTC  - Psych Eval, recommended SSRI, but pt. refused   - Psych recommending atarax PRN  - Continue to monitor mental status    PT/Conditioning  - Continue band exercised while on bedrest s/t IABP    ==================== RESPIRATORY======================  Acute Hypoxemic Respiratory Failure  - s/p x2 intubations for cardiogenic pulm edema and the in setting of cardiac arrest, resolved - extubated 11/10  - Currently maintaining >95% sats on room air  - Continue incentive spirometry and monitoring of sp02    Asthma  - c/w albuterol, symbicort and spiriva  - On trelegy at home  - Continue to monitor SpO2 with goal >94%    ====================CARDIOVASCULAR==================  Vfib arrest i/s/o ischemia  - Lido gtt off   - PO Amio load - total of 5g per EP complete , continue PO amio  - Amio held for rising LFTs, continue to hold  - Keep K > 4, Mag > 2.2     Cardiogenic shock requiring IABP (- , -)  - Likely 2/2 NSTEMI and ADHF  -  LHC: pLAD 100 % in-stent restenosis & mRCA, 100 %. PCWP 30. IABP placed.  -  TTE: LV dilated. EF 32 %. Regional WMAs present, mod (grade 2) LV diastolic dysfunction  -  TTE: EF 22% and + LV thrombus  - IABP swapped  to RFA, continue 1:1 support  - Off Milrinone gtt @ 7:30 am   - [place] d/c'd due to elevated K levels  - c/w coreg 25 BID for GDMT  - HIT and HAIDER sent (12/3) as per HF   - UNOS status 2 as of   -  - reduced hydralazine 100 TID to 75 TID and ISD 40 TID to 30 TID for AL reduction    NSTEMI iso stent re-occlusion of pLAD and 100%  of RCA  - EKG on admission w/ LBBB  - DAPT: c/w ASA, Brilinta d/c'd per transplant w/u  - c/w lipitor 80  - cMR deferred given necessity of IABP  - CT sx not recommending CABG, undergoing AT eval    LV thrombus  - c/w heparin gtt w/ therapeutic PTT    ===================== RENAL =========================  Non-oliguric ARIC, resolved   - Baseline Cr: -  - Renal US: no evidence of renal artery stenosis  - Trend BMP, lytes daily, replace as needed  - Continue Strict I/Os, avoid nephrotoxins    Mild Hyponatremia/Hyperkalemia iso ARIC  - Continue fluid restriction   - Urea powder d/c'd per renal  - Trend daily  - Continue monitoring urine output, lytes, SCr/ BUN  - Replete lytes prn with goal K >4 and Mg >2    =============== GASTROINTESTINAL===================  Constipation/ileus, resolved  - regular diet  - bowel reg prn    ===================ENDO====================  Type 2 DM  - A1c 8.3   - Continue lantus, premeal, low ISS  - continue FS    ===================HEMATOLOGIC/ONC ===================  - H/H & plts stable  - Monitor H/H and plts  - VTE PPX: heparin gtt    ==================INFECTIOUS DISEASE================  # Positive blood culture, resolved  - Repeat BCx drawn  for leukocytosis to 12k - positive on , G+ rods in anaerobic bottle, suspect contaminant  - Repeat cultures x2 sent  per transplant ID recs - no growth to date  - Vancomycin by trough (-)  - Final microbial ID: Cutibacterium acnes, per ID this is likely to be a skin contaminant, vanc dc'd.   - Dental eval  to rule out infectious etiology that would have led to bacteremia, no significant findings on exam.     # Enterococcus faecalis bacteremia, resolved  - BCx + for enterococcus faecalis x2, Staph epi x1 (likely contaminant)- pan sensitive   - Urine cx  + enterococcus faecalis  - BCx  no growth   - IABP site swapped to RFA   - s/p Vancomycin 1g q12h (-)  - CT A/P negative for infectious pathology    # COVID, resolved  - Off airborne precautions     # Pre-transplant ID w/u   - Trend ID recs for serologies   - Colonoscopy  - normal   - Chest CT  - improved LLL aeration  - s/p immunizations    ==================INFECTIOUS DISEASE================  # Upper Extremity Rash  - Patient developed bilateral upper extremity rash after receiving Hepatitis A & B immunizations on   - Dermatology consulted  - not likely to be related to vanco, can continue per ID recs  - Recommend clobetasol 0.05% BID to affected areas   - RVP repeated to rule out viral etiology, negative      Patient requires continuous monitoring with bedside rhythm monitoring, pulse ox monitoring, and intermittent blood gas analysis. Care plan discussed with ICU care team. Patient remained critical and at risk for life threatening decompensation.  Patient seen, examined and plan discussed with CCU team during rounds.     I have personally provided 35 minutes of critical care time excluding time spent on separate procedures, in addition to initial critical care time provided by the CICU Attending, Dr. Rowe.    By signing my name below, I, Kalyn Sousa, attest that this documentation has been prepared under the direction and in the presence of KARYN Choi.  Electronically signed: Sangita Booth, 23 @ 19:51    I, Niurka Brizuela_ , personally performed the services described in this documentation. all medical record entries made by the sangita were at my direction and in my presence. I have reviewed the chart and agree that the record reflects my personal performance and is accurate and complete  Electronically signed: KARYN Choi.

## 2023-12-09 NOTE — PROGRESS NOTE ADULT - ASSESSMENT
57 yo M with HFrEF (LVIDd 6.4 cm, LVEF 32%), CAD s/p PCI (2008), HTN, DMT2 (A1c 8.3%) and CVA s/p TPA (2018), recently treated for PNA who initially presented to [facility] via EMS after syncope reportedly requiring defibrilation. Treated for ACS but left AMA to come to [facility]. On arrival ECG with ST depression in lateral leads. Intubated 11/1 for respiratory failure. Found to have COVID. L/RHC 11/1revealing  of LAD and RCA, elevated filling pressures and CI of 1.5 prompting placement of IABP. Extubated 11/3. IABP was weaned to off 11/7, then the following day on 11/8, developed PMVT cardiac arrest with prompt CPR and defibrillation, started on IV Lidocaine and Amio. IABP ultimately replaced on 11/9 for worsening hypotension. TTE 11/11 revealing LV thrombus.     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 AT evaluation launched 11/10, he is ABO A. He was discussed during TSC on 11/16 and was approved for listing, however was found to be Bacteremic with 11/17 Blc Cx growing mixed cultures Staph epi and Enterococcus faecalis and started on IV Vanco. Repeat cultures from 11/18 reveal NGTD and therefore was cleared by ID for listing.     He appears adequately supported on IABP at 1:1, electrically quiescent on oral Amiodarone. Cr is improving with holding diuretics. Labs otherwise notable for worsening thrombocytopenia. Awaiting suitable donor. GM + rods (Cutibacterium) in blood culture on 11/30 (reported on 12/4), restarted on vancomycin, and status 7, repeat cultures negative x48 hours (12/6),  now status 2 again.         Cardiac Studies  11/21/23 TTE: limited unable to rule out endocarditis, technically difficult study  11/1123 TTE: LVEF 22% (global), LV thrombus, normal RV size and function, mild MR, trace TR  11/1/23  LHC showed pLAD 70 % stenosis in the ostium portion of the segment and 100 % in-stent restenosis and in mRCA, 100 % stenosis.   11/1/23 RHC; RA 23 Vw 24, PA 52/33/40, PCWP 30 Vw 33, AO 85/66/73, PA sat 54.4%, CO/CI (F) 2.96/1.44, IABP was placed during the cath through R common femoral artery.   11/1/23 TTE: LVIDd 6.4cm , LVEF 32%, regional WMA, grade II DD,  TAPSE 1.7cm, mld MR, trace TR,      59 yo M with HFrEF (LVIDd 6.4 cm, LVEF 32%), CAD s/p PCI (2008), HTN, DMT2 (A1c 8.3%) and CVA s/p TPA (2018), recently treated for PNA who initially presented to [facility] via EMS after syncope reportedly requiring defibrilation. Treated for ACS but left AMA to come to [facility]. On arrival ECG with ST depression in lateral leads. Intubated 11/1 for respiratory failure. Found to have COVID. L/RHC 11/1revealing  of LAD and RCA, elevated filling pressures and CI of 1.5 prompting placement of IABP. Extubated 11/3. IABP was weaned to off 11/7, then the following day on 11/8, developed PMVT cardiac arrest with prompt CPR and defibrillation, started on IV Lidocaine and Amio. IABP ultimately replaced on 11/9 for worsening hypotension. TTE 11/11 revealing LV thrombus.     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 AT evaluation launched 11/10, he is ABO A. He was discussed during TSC on 11/16 and was approved for listing, however was found to be Bacteremic with 11/17 Blc Cx growing mixed cultures Staph epi and Enterococcus faecalis and started on IV Vanco. Repeat cultures from 11/18 reveal NGTD and therefore was cleared by ID for listing.     He appears adequately supported on IABP at 1:1, electrically quiescent on oral Amiodarone. Cr is improving with holding diuretics. Labs otherwise notable for worsening thrombocytopenia. Awaiting suitable donor. GM + rods (Cutibacterium) in blood culture on 11/30 (reported on 12/4), restarted on vancomycin, and status 7, repeat cultures negative x48 hours (12/6),  now status 2 again.         Cardiac Studies  11/21/23 TTE: limited unable to rule out endocarditis, technically difficult study  11/1123 TTE: LVEF 22% (global), LV thrombus, normal RV size and function, mild MR, trace TR  11/1/23  LHC showed pLAD 70 % stenosis in the ostium portion of the segment and 100 % in-stent restenosis and in mRCA, 100 % stenosis.   11/1/23 RHC; RA 23 Vw 24, PA 52/33/40, PCWP 30 Vw 33, AO 85/66/73, PA sat 54.4%, CO/CI (F) 2.96/1.44, IABP was placed during the cath through R common femoral artery.   11/1/23 TTE: LVIDd 6.4cm , LVEF 32%, regional WMA, grade II DD,  TAPSE 1.7cm, mld MR, trace TR,

## 2023-12-09 NOTE — PROGRESS NOTE ADULT - CRITICAL CARE ATTENDING COMMENT
57yo gentleman HTN CAD (LAD,) CVA, DM, HFrEF with NSTEMI, pulm edema requiring intubation and IABP, VFxdefib now extubated with IABP and no inotropes here for >one month.  On aspirin, lipitor, heparin, hydra, isosorbide,coreg  Neg 1.2 liters without diuretics.  Status 2  Na 133 stable rest lytes stable, lactate 1.3, PTT stable  IABP a little low may need to advance under fluoro. 59yo gentleman HTN CAD (LAD,) CVA, DM, HFrEF with NSTEMI, pulm edema requiring intubation and IABP, VFxdefib now extubated with IABP and no inotropes here for >one month.  On aspirin, lipitor, heparin, hydra, isosorbide,coreg  Neg 1.2 liters without diuretics.  Status 2  Na 133 stable rest lytes stable, lactate 1.3, PTT stable  IABP a little low may need to advance under fluoro. 59yo gentleman HTN CAD (LAD,) CVA, DM, HFrEF with NSTEMI, pulm edema requiring intubation and IABP, VFxdefib now extubated with IABP and no inotropes here for >one month.  On aspirin, lipitor, heparin, hydra, isosorbide,coreg  Neg 1.2 liters without diuretics.  Status 2  Na 133 stable rest lytes stable, lactate 1.3, PTT stable  IABP a little low may need to advance under fluoro.    Patient in better spirits. Apparently I stented him in 2009. 57yo gentleman HTN CAD (LAD,) CVA, DM, HFrEF with NSTEMI, pulm edema requiring intubation and IABP, VFxdefib now extubated with IABP and no inotropes here for >one month.  On aspirin, lipitor, heparin, hydra, isosorbide,coreg  Neg 1.2 liters without diuretics.  Status 2  Na 133 stable rest lytes stable, lactate 1.3, PTT stable  IABP a little low may need to advance under fluoro.    Patient in better spirits. Apparently I stented him in 2009.

## 2023-12-09 NOTE — PROGRESS NOTE ADULT - ASSESSMENT
58 male with HTN, CAD (s/p PCI 2008), HFrEF, CVA 2018, and T2DM presenting with chest pressure and unknown tachycardia that was shocked x1, C 11/1 found to have in-stent restenosis of pLAD and  of RCA with elevated RA and PA pressures and severely decreased. Admitted to CICU for management of cardiogenic shock and ADHF requiring IABP 11/1 -11/7, with hospital course c/b vfib arrest requiring reinsertion of IABP. Not recommended for ATs per HF. Currently listed for transplant status 2.    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 AT evaluation launched 11/10, he is ABO A. He was discussed during TSC on 11/16 and was approved for listing, however was found to be Bacteremic with 11/17 Blc Cx growing mixed cultures Staph epi and Enterococcus faecalis and started on IV Vanco. Repeat cultures from 11/18 reveal NGTD and therefore was cleared by ID for listing.     He appears adequately supported on IABP at 1:1, electrically quiescent on oral Amiodarone. Cr is improving with holding diuretics. Labs otherwise notable for worsening thrombocytopenia. Awaiting suitable donor. Now with GM + rods in blood culture on 11/30 (reported on 12/4), restarted on vancomycin, and status 7, repeat cultures now negative x48 hours (12/6),  now status 2 again.       ====================== NEUROLOGY=====================  Anxiety  - No Seroquel/antipsychotics since vfib arrest and prolonged QTC  - Psych Eval, recommended SSRI, but pt. refused   - Psych recommending atarax PRN  - Continue to monitor mental status    PT/Conditioning  - Continue band exercised while on bedrest s/t IABP    ==================== RESPIRATORY======================  Acute Hypoxemic Respiratory Failure  - s/p x2 intubations for cardiogenic pulm edema and the in setting of cardiac arrest, resolved - extubated 11/10  - Currently maintaining >95% sats on room air  - Continue incentive spirometry and monitoring of sp02    Asthma  - c/w albuterol, symbicort and spiriva  - On trelegy at home  - Continue to monitor SpO2 with goal >94%    ====================CARDIOVASCULAR==================  Vfib arrest i/s/o ischemia  - Lido gtt off 11/13  - PO Amio load - total of 5g per EP complete 11/17, continue PO amio  - Amio held for rising LFTs, continue to hold  - Keep K > 4, Mag > 2.2     Cardiogenic shock requiring IABP (11/1- 11/7, 11/9-)  - Likely 2/2 NSTEMI and ADHF  - 11/1 LHC: pLAD 100 % in-stent restenosis & mRCA, 100 %. PCWP 30. IABP placed.  - 11/1 TTE: LV dilated. EF 32 %. Regional WMAs present, mod (grade 2) LV diastolic dysfunction  - 11/2 TTE: EF 22% and + LV thrombus  - IABP swapped 11/20 to RFA, continue 1:1 support  - Off Milrinone gtt @ 7:30 am 11/11  - [place] d/c'd due to elevated K levels  - c/w coreg 25 BID for GDMT  - HIT and HAIDER sent (12/3) as per HF   - UNOS status 2 as of 12/6  - 12/5 - reduced hydralazine 100 TID to 75 TID and ISD 40 TID to 30 TID for AL reduction    NSTEMI iso stent re-occlusion of pLAD and 100%  of RCA  - EKG on admission w/ LBBB  - DAPT: c/w ASA, Brilinta d/c'd per transplant w/u  - c/w lipitor 80  - cMR deferred given necessity of IABP  - CT sx not recommending CABG, undergoing AT eval    LV thrombus  - c/w heparin gtt    ===================== RENAL =========================  Non-oliguric ARIC, resolved   - Baseline Cr: 1-1.22  - Renal US: no evidence of renal artery stenosis  - Trend BMP, lytes daily, replace as needed  - Continue Strict I/Os, avoid nephrotoxins    Mild Hyponatremia/Hyperkalemia iso ARIC  - Continue fluid restriction   - Urea powder d/c'd per renal  - Trend daily  - Continue monitoring urine output, lytes, SCr/ BUN  - Replete lytes prn with goal K >4 and Mg >2    =============== GASTROINTESTINAL===================  Constipation/ileus, resolved  - regular diet  - bowel reg prn    ===================ENDO====================  Type 2 DM  - A1c 8.3  - Continue lantus, premeal, low ISS  - continue FS    ===================HEMATOLOGIC/ONC ===================  - H/H & plts stable  - Monitor H/H and plts  - VTE PPX: heparin gtt    ==================INFECTIOUS DISEASE================  # Positive blood culture, resolved  - Repeat BCx drawn 11/30 for leukocytosis to 12k - positive on 12/4, G+ rods in anaerobic bottle, suspect contaminant  - Repeat cultures x2 sent 12/4 per transplant ID recs - no growth to date  - Vancomycin by trough (12/4-12/6)  - Final microbial ID: Cutibacterium acnes, per ID this is likely to be a skin contaminant, vanc dc'd.   - Dental eval 12/7 to rule out infectious etiology that would have led to bacteremia, no significant findings on exam.     # Enterococcus faecalis bacteremia, resolved  - BCx 11/17+ for enterococcus faecalis x2, Staph epi x1 (likely contaminant)- pan sensitive   - Urine cx 11/18 + enterococcus faecalis  - BCx 11/18 no growth   - IABP site swapped to RFA 11/20  - s/p Vancomycin 1g q12h (11/18-11/27)  - CT A/P negative for infectious pathology    # COVID, resolved  - Off airborne precautions 11/11    # Pre-transplant ID w/u   - Trend ID recs for serologies   - Colonoscopy 11/17 - normal   - Chest CT 11/17 - improved LLL aeration  - s/p immunizations    ==================INFECTIOUS DISEASE================  # Upper Extremity Rash  - Patient developed bilateral upper extremity rash after receiving Hepatitis A & B immunizations on 11/24  - Dermatology consulted 12/5 - not likely to be related to vanco, can continue per ID recs  - Recommend clobetasol 0.05% BID to affected areas   - RVP repeated to rule out viral etiology, negative       58 male with HTN, CAD (s/p PCI 2008), HFrEF, CVA 2018, and T2DM presenting with chest pressure and unknown tachycardia that was shocked x1, C 11/1 found to have in-stent restenosis of pLAD and  of RCA with elevated RA and PA pressures and severely decreased. Admitted to CICU for management of cardiogenic shock and ADHF requiring IABP 11/1 -11/7, with hospital course c/b vfib arrest requiring reinsertion of IABP. Not recommended for ATs per HF. Currently listed for transplant status 2.    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 AT evaluation launched 11/10, he is ABO A. He was discussed during TSC on 11/16 and was approved for listing, however was found to be Bacteremic with 11/17 Blc Cx growing mixed cultures Staph epi and Enterococcus faecalis and started on IV Vanco. Repeat cultures from 11/18 reveal NGTD and therefore was cleared by ID for listing.     He appears adequately supported on IABP at 1:1, electrically quiescent on oral Amiodarone. Cr is improving with holding diuretics. Labs otherwise notable for worsening thrombocytopenia. Awaiting suitable donor. Now with GM + rods in blood culture on 11/30 (reported on 12/4), restarted on vancomycin, and status 7, repeat cultures now negative x48 hours (12/6),  now status 2 again.       ====================== NEUROLOGY=====================  Anxiety  - No Seroquel/antipsychotics since vfib arrest and prolonged QTC  - Psych Eval, recommended SSRI, but pt. refused   - Psych recommending atarax PRN  - Continue to monitor mental status    PT/Conditioning  - Continue band exercised while on bedrest s/t IABP    ==================== RESPIRATORY======================  Acute Hypoxemic Respiratory Failure  - s/p x2 intubations for cardiogenic pulm edema and the in setting of cardiac arrest, resolved - extubated 11/10  - Currently maintaining >95% sats on room air  - Continue incentive spirometry and monitoring of sp02    Asthma  - c/w albuterol, symbicort and spiriva  - On trelegy at home  - Continue to monitor SpO2 with goal >94%    ====================CARDIOVASCULAR==================  Vfib arrest i/s/o ischemia  - Lido gtt off 11/13  - PO Amio load - total of 5g per EP complete 11/17, continue PO amio  - Amio held for rising LFTs, continue to hold  - Keep K > 4, Mag > 2.2     Cardiogenic shock requiring IABP (11/1- 11/7, 11/9-)  - Likely 2/2 NSTEMI and ADHF  - 11/1 LHC: pLAD 100 % in-stent restenosis & mRCA, 100 %. PCWP 30. IABP placed.  - 11/1 TTE: LV dilated. EF 32 %. Regional WMAs present, mod (grade 2) LV diastolic dysfunction  - 11/2 TTE: EF 22% and + LV thrombus  - IABP swapped 11/20 to RFA, continue 1:1 support  - Off Milrinone gtt @ 7:30 am 11/11  - [place] d/c'd due to elevated K levels  - c/w coreg 25 BID for GDMT  - HIT and HAIDER sent (12/3) as per HF   - UNOS status 2 as of 12/6  - 12/5 - reduced hydralazine 100 TID to 75 TID and ISD 40 TID to 30 TID for AL reduction    NSTEMI iso stent re-occlusion of pLAD and 100%  of RCA  - EKG on admission w/ LBBB  - DAPT: c/w ASA, Brilinta d/c'd per transplant w/u  - c/w lipitor 80  - cMR deferred given necessity of IABP  - CT sx not recommending CABG, undergoing AT eval    LV thrombus  - c/w heparin gtt    ===================== RENAL =========================  Non-oliguric ARIC, resolved   - Baseline Cr: 1-1.22  - Renal US: no evidence of renal artery stenosis  - Trend BMP, lytes daily, replace as needed  - Continue Strict I/Os, avoid nephrotoxins    Mild Hyponatremia/Hyperkalemia iso ARIC  - Continue fluid restriction   - Urea powder d/c'd per renal  - Trend daily  - Continue monitoring urine output, lytes, SCr/ BUN  - Replete lytes prn with goal K >4 and Mg >2    =============== GASTROINTESTINAL===================  Constipation/ileus, resolved  - regular diet  - bowel reg prn    ===================ENDO====================  Type 2 DM  - A1c 8.3  - Continue lantus, premeal, low ISS  - continue FS    ===================HEMATOLOGIC/ONC ===================  - H/H & plts stable  - Monitor H/H and plts  - VTE PPX: heparin gtt    ==================INFECTIOUS DISEASE================  # Positive blood culture, resolved  - Repeat BCx drawn 11/30 for leukocytosis to 12k - positive on 12/4, G+ rods in anaerobic bottle, suspect contaminant  - Repeat cultures x2 sent 12/4 per transplant ID recs - no growth to date  - Vancomycin by trough (12/4-12/6)  - Final microbial ID: Cutibacterium acnes, per ID this is likely to be a skin contaminant, vanc dc'd.   - Dental eval 12/7 to rule out infectious etiology that would have led to bacteremia, no significant findings on exam.     # Enterococcus faecalis bacteremia, resolved  - BCx 11/17+ for enterococcus faecalis x2, Staph epi x1 (likely contaminant)- pan sensitive   - Urine cx 11/18 + enterococcus faecalis  - BCx 11/18 no growth   - IABP site swapped to RFA 11/20  - s/p Vancomycin 1g q12h (11/18-11/27)  - CT A/P negative for infectious pathology    # COVID, resolved  - Off airborne precautions 11/11    # Pre-transplant ID w/u   - Trend ID recs for serologies   - Colonoscopy 11/17 - normal   - Chest CT 11/17 - improved LLL aeration  - s/p immunizations    ==================INFECTIOUS DISEASE================  # Upper Extremity Rash  - Patient developed bilateral upper extremity rash after receiving Hepatitis A & B immunizations on 11/24  - Dermatology consulted 12/5 - not likely to be related to vanco, can continue per ID recs  - Recommend clobetasol 0.05% BID to affected areas   - RVP repeated to rule out viral etiology, negative       58 male with HTN, CAD (s/p PCI 2008), HFrEF, CVA 2018, and T2DM presenting with chest pressure and unknown tachycardia that was shocked x1, C 11/1 found to have in-stent restenosis of pLAD and  of RCA with elevated RA and PA pressures and severely decreased. Admitted to CICU for management of cardiogenic shock and ADHF requiring IABP 11/1 -11/7, with hospital course c/b vfib arrest requiring reinsertion of IABP. Not recommended for ATs per HF. Currently listed for transplant status 2.    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 AT evaluation launched 11/10, he is ABO A. He was discussed during TSC on 11/16 and was approved for listing, however was found to be Bacteremic with 11/17 Blc Cx growing mixed cultures Staph epi and Enterococcus faecalis and started on IV Vanco. Repeat cultures from 11/18 reveal NGTD and therefore was cleared by ID for listing.     He appears adequately supported on IABP at 1:1, electrically quiescent on oral Amiodarone. Cr is improving with holding diuretics. Labs otherwise notable for worsening thrombocytopenia. Awaiting suitable donor. Now with GM + rods in blood culture on 11/30 (reported on 12/4), restarted on vancomycin, and status 7, repeat cultures now negative x48 hours (12/6),  now status 2 again.       ====================== NEUROLOGY=====================  Anxiety  - No Seroquel/antipsychotics since vfib arrest and prolonged QTC  - Psych Eval, recommended SSRI, but pt. refused   - Psych recommending atarax PRN  - Continue to monitor mental status    PT/Conditioning  - Continue band exercised while on bedrest s/t IABP    ==================== RESPIRATORY======================  Acute Hypoxemic Respiratory Failure  - s/p x2 intubations for cardiogenic pulm edema and the in setting of cardiac arrest, resolved - extubated 11/10  - Currently maintaining >95% sats on room air  - Continue incentive spirometry and monitoring of sp02    Asthma  - c/w albuterol, symbicort and spiriva  - On trelegy at home  - Continue to monitor SpO2 with goal >94%    ====================CARDIOVASCULAR==================  Vfib arrest i/s/o ischemia  - Lido gtt off 11/13  - PO Amio load - total of 5g per EP complete 11/17, continue PO amio  - Amio held for rising LFTs, continue to hold  - Keep K > 4, Mag > 2.2     Cardiogenic shock requiring IABP (11/1- 11/7, 11/9-)  - Likely 2/2 NSTEMI and ADHF  - 11/1 LHC: pLAD 100 % in-stent restenosis & mRCA, 100 %. PCWP 30. IABP placed.  - 11/1 TTE: LV dilated. EF 32 %. Regional WMAs present, mod (grade 2) LV diastolic dysfunction  - 11/2 TTE: EF 22% and + LV thrombus  - IABP swapped 11/20 to RFA, continue 1:1 support  - Off Milrinone gtt @ 7:30 am 11/11  - [place] d/c'd due to elevated K levels  - c/w coreg 25 BID for GDMT  - HIT and HAIDER sent (12/3) as per HF   - UNOS status 2 as of 12/6  - 12/5 - reduced hydralazine 100 TID to 75 TID and ISD 40 TID to 30 TID for AL reduction    NSTEMI iso stent re-occlusion of pLAD and 100%  of RCA  - EKG on admission w/ LBBB  - DAPT: c/w ASA, Brilinta d/c'd per transplant w/u  - c/w lipitor 80  - cMR deferred given necessity of IABP  - CT sx not recommending CABG, undergoing AT eval    LV thrombus  - c/w heparin gtt w/ therapeutic PTT    ===================== RENAL =========================  Non-oliguric ARIC, resolved   - Baseline Cr: 1-1.22  - Renal US: no evidence of renal artery stenosis  - Trend BMP, lytes daily, replace as needed  - Continue Strict I/Os, avoid nephrotoxins    Mild Hyponatremia/Hyperkalemia iso ARIC  - Continue fluid restriction   - Urea powder d/c'd per renal  - Trend daily  - Continue monitoring urine output, lytes, SCr/ BUN  - Replete lytes prn with goal K >4 and Mg >2    =============== GASTROINTESTINAL===================  Constipation/ileus, resolved  - regular diet  - bowel reg prn    ===================ENDO====================  Type 2 DM  - A1c 8.3   - Continue lantus, premeal, low ISS  - continue FS    ===================HEMATOLOGIC/ONC ===================  - H/H & plts stable  - Monitor H/H and plts  - VTE PPX: heparin gtt    ==================INFECTIOUS DISEASE================  # Positive blood culture, resolved  - Repeat BCx drawn 11/30 for leukocytosis to 12k - positive on 12/4, G+ rods in anaerobic bottle, suspect contaminant  - Repeat cultures x2 sent 12/4 per transplant ID recs - no growth to date  - Vancomycin by trough (12/4-12/6)  - Final microbial ID: Cutibacterium acnes, per ID this is likely to be a skin contaminant, vanc dc'd.   - Dental eval 12/7 to rule out infectious etiology that would have led to bacteremia, no significant findings on exam.     # Enterococcus faecalis bacteremia, resolved  - BCx 11/17+ for enterococcus faecalis x2, Staph epi x1 (likely contaminant)- pan sensitive   - Urine cx 11/18 + enterococcus faecalis  - BCx 11/18 no growth   - IABP site swapped to RFA 11/20  - s/p Vancomycin 1g q12h (11/18-11/27)  - CT A/P negative for infectious pathology    # COVID, resolved  - Off airborne precautions 11/11    # Pre-transplant ID w/u   - Trend ID recs for serologies   - Colonoscopy 11/17 - normal   - Chest CT 11/17 - improved LLL aeration  - s/p immunizations    ==================INFECTIOUS DISEASE================  # Upper Extremity Rash  - Patient developed bilateral upper extremity rash after receiving Hepatitis A & B immunizations on 11/24  - Dermatology consulted 12/5 - not likely to be related to vanco, can continue per ID recs  - Recommend clobetasol 0.05% BID to affected areas   - RVP repeated to rule out viral etiology, negative       58 male with HTN, CAD (s/p PCI 2008), HFrEF, CVA 2018, and T2DM presenting with chest pressure and unknown tachycardia that was shocked x1, C 11/1 found to have in-stent restenosis of pLAD and  of RCA with elevated RA and PA pressures and severely decreased. Admitted to CICU for management of cardiogenic shock and ADHF requiring IABP 11/1 -11/7, with hospital course c/b vfib arrest requiring reinsertion of IABP. Not recommended for ATs per HF. Currently listed for transplant status 2.    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 AT evaluation launched 11/10, he is ABO A. He was discussed during TSC on 11/16 and was approved for listing, however was found to be Bacteremic with 11/17 Blc Cx growing mixed cultures Staph epi and Enterococcus faecalis and started on IV Vanco. Repeat cultures from 11/18 reveal NGTD and therefore was cleared by ID for listing.     He appears adequately supported on IABP at 1:1, electrically quiescent on oral Amiodarone. Cr is improving with holding diuretics. Labs otherwise notable for worsening thrombocytopenia. Awaiting suitable donor. Now with GM + rods in blood culture on 11/30 (reported on 12/4), restarted on vancomycin, and status 7, repeat cultures now negative x48 hours (12/6),  now status 2 again.       ====================== NEUROLOGY=====================  Anxiety  - No Seroquel/antipsychotics since vfib arrest and prolonged QTC  - Psych Eval, recommended SSRI, but pt. refused   - Psych recommending atarax PRN  - Continue to monitor mental status    PT/Conditioning  - Continue band exercised while on bedrest s/t IABP    ==================== RESPIRATORY======================  Acute Hypoxemic Respiratory Failure  - s/p x2 intubations for cardiogenic pulm edema and the in setting of cardiac arrest, resolved - extubated 11/10  - Currently maintaining >95% sats on room air  - Continue incentive spirometry and monitoring of sp02    Asthma  - c/w albuterol, symbicort and spiriva  - On trelegy at home  - Continue to monitor SpO2 with goal >94%    ====================CARDIOVASCULAR==================  Vfib arrest i/s/o ischemia  - Lido gtt off 11/13  - PO Amio load - total of 5g per EP complete 11/17, continue PO amio  - Amio held for rising LFTs, continue to hold  - Keep K > 4, Mag > 2.2     Cardiogenic shock requiring IABP (11/1- 11/7, 11/9-)  - Likely 2/2 NSTEMI and ADHF  - 11/1 LHC: pLAD 100 % in-stent restenosis & mRCA, 100 %. PCWP 30. IABP placed.  - 11/1 TTE: LV dilated. EF 32 %. Regional WMAs present, mod (grade 2) LV diastolic dysfunction  - 11/2 TTE: EF 22% and + LV thrombus  - IABP swapped 11/20 to RFA, continue 1:1 support  - Off Milrinone gtt @ 7:30 am 11/11  - [place] d/c'd due to elevated K levels  - c/w coreg 25 BID for GDMT  - HIT and HAIDER sent (12/3) as per HF   - UNOS status 2 as of 12/6  - 12/5 - reduced hydralazine 100 TID to 75 TID and ISD 40 TID to 30 TID for AL reduction    NSTEMI iso stent re-occlusion of pLAD and 100%  of RCA  - EKG on admission w/ LBBB  - DAPT: c/w ASA, Brilinta d/c'd per transplant w/u  - c/w lipitor 80  - cMR deferred given necessity of IABP  - CT sx not recommending CABG, undergoing AT eval    LV thrombus  - c/w heparin gtt w/ therapeutic PTT    ===================== RENAL =========================  Non-oliguric ARIC, resolved   - Baseline Cr: 1-1.22  - Renal US: no evidence of renal artery stenosis  - Trend BMP, lytes daily, replace as needed  - Continue Strict I/Os, avoid nephrotoxins    Mild Hyponatremia/Hyperkalemia iso ARIC  - Continue fluid restriction   - Urea powder d/c'd per renal  - Trend daily  - Continue monitoring urine output, lytes, SCr/ BUN  - Replete lytes prn with goal K >4 and Mg >2    =============== GASTROINTESTINAL===================  Constipation/ileus, resolved  - regular diet  - bowel reg prn    ===================ENDO====================  Type 2 DM  - A1c 8.3   - Continue lantus, premeal, low ISS  - continue FS    ===================HEMATOLOGIC/ONC ===================  - H/H & plts stable  - Monitor H/H and plts  - VTE PPX: heparin gtt    ==================INFECTIOUS DISEASE================  # Positive blood culture, resolved  - Repeat BCx drawn 11/30 for leukocytosis to 12k - positive on 12/4, G+ rods in anaerobic bottle, suspect contaminant  - Repeat cultures x2 sent 12/4 per transplant ID recs - no growth to date  - Vancomycin by trough (12/4-12/6)  - Final microbial ID: Cutibacterium acnes, per ID this is likely to be a skin contaminant, vanc dc'd.   - Dental eval 12/7 to rule out infectious etiology that would have led to bacteremia, no significant findings on exam.     # Enterococcus faecalis bacteremia, resolved  - BCx 11/17+ for enterococcus faecalis x2, Staph epi x1 (likely contaminant)- pan sensitive   - Urine cx 11/18 + enterococcus faecalis  - BCx 11/18 no growth   - IABP site swapped to RFA 11/20  - s/p Vancomycin 1g q12h (11/18-11/27)  - CT A/P negative for infectious pathology    # COVID, resolved  - Off airborne precautions 11/11    # Pre-transplant ID w/u   - Trend ID recs for serologies   - Colonoscopy 11/17 - normal   - Chest CT 11/17 - improved LLL aeration  - s/p immunizations    ==================INFECTIOUS DISEASE================  # Upper Extremity Rash  - Patient developed bilateral upper extremity rash after receiving Hepatitis A & B immunizations on 11/24  - Dermatology consulted 12/5 - not likely to be related to vanco, can continue per ID recs  - Recommend clobetasol 0.05% BID to affected areas   - RVP repeated to rule out viral etiology, negative

## 2023-12-09 NOTE — PROGRESS NOTE ADULT - SUBJECTIVE AND OBJECTIVE BOX
LD VALENCIA  58y Male  MRN:[id number]    Patient is a 58y old  Male who presents with a chief complaint of NSTEMI (01 Nov 2023 20:29)    HPI:  57yo M w/ hx HTN, CAD w/ 1 stent in 2009, ICH (2008) presenting with abn ekg. Patient presented to [facility] where he was found to have STEMI, recommended to get cath however patient did not want to get it there so it left and came here.  Patient initially had cough, congestion, fever, was placed on antibiotics on Sunday.  Started feeling nauseous and had a presyncopal event after which he presented to ED last night.  Had chest pain as well.  Chest pain is midsternal.  Not currently having chest pain.  Received 4 aspirin 30 min pta. (01 Nov 2023 15:11)      Patient seen and evaluated at bedside in CCU. interval events noted    Interval HPI: remain in ccu. IABP in place        PAST MEDICAL & SURGICAL HISTORY:  HTN (hypertension)      CAD (coronary artery disease)  2009; stent      Intracranial hemorrhage  2008      Respiratory arrest  december 1st      Myocardial infarction, unspecified MI type, unspecified artery      History of coronary artery stent placement          REVIEW OF SYSTEMS:  as per hpi     VITALS:   ICU Vital Signs Last 24 Hrs  T(C): 36.7 (09 Dec 2023 19:00), Max: 37.2 (08 Dec 2023 23:00)  T(F): 98 (09 Dec 2023 19:00), Max: 99 (08 Dec 2023 23:00)  HR: 82 (09 Dec 2023 22:00) (78 - 92)  BP: --  BP(mean): --  ABP: --  ABP(mean): --  RR: 23 (09 Dec 2023 22:00) (15 - 27)  SpO2: 99% (09 Dec 2023 22:00) (96% - 100%)    O2 Parameters below as of 09 Dec 2023 22:00  Patient On (Oxygen Delivery Method): room air            PHYSICAL EXAM:  GENERAL: NAD, comfortable   HEAD:  Atraumatic, Normocephalic  EYES: EOMI, PERRLA, conjunctiva and sclera clear  NECK: Supple, No JVD   CHEST/LUNG: Clear to auscultation bilaterally; No wheeze  HEART: Regular rate and rhythm; No murmurs, rubs, or gallops  ABDOMEN: Soft, Nontender, Nondistended; Bowel sounds present  EXTREMITIES:  2+ Peripheral Pulses, No clubbing, cyanosis, or edema  NEUROLOGY: nonfocal  SKIN: +rash  +iabp    Consultant(s) Notes Reviewed:  [x ] YES  [ ] NO  Care Discussed with Consultants/Other Providers [ x] YES  [ ] NO    MEDS:   MEDICATIONS  (STANDING):  aspirin  chewable 81 milliGRAM(s) Oral daily  atorvastatin 80 milliGRAM(s) Oral at bedtime  budesonide  80 MICROgram(s)/formoterol 4.5 MICROgram(s) Inhaler 2 Puff(s) Inhalation two times a day  carvedilol 25 milliGRAM(s) Oral every 12 hours  chlorhexidine 2% Cloths 1 Application(s) Topical daily  clobetasol 0.05% Ointment 1 Application(s) Topical two times a day  heparin  Infusion 1300 Unit(s)/Hr (14 mL/Hr) IV Continuous <Continuous>  hydrALAZINE 75 milliGRAM(s) Oral three times a day  insulin glargine Injectable (LANTUS) 12 Unit(s) SubCutaneous at bedtime  insulin lispro (ADMELOG) corrective regimen sliding scale   SubCutaneous three times a day before meals  insulin lispro (ADMELOG) corrective regimen sliding scale   SubCutaneous at bedtime  insulin lispro Injectable (ADMELOG) 9 Unit(s) SubCutaneous three times a day before meals  isosorbide   dinitrate Tablet (ISORDIL) 30 milliGRAM(s) Oral three times a day  polyethylene glycol 3350 17 Gram(s) Oral two times a day  senna 2 Tablet(s) Oral at bedtime    MEDICATIONS  (PRN):  hydrOXYzine hydrochloride 25 milliGRAM(s) Oral two times a day PRN Anxiety      ALLERGIES:  penicillins (Unknown)      LABS:                                        11.6   8.08  )-----------( 131      ( 09 Dec 2023 00:59 )             35.9   12-09    133<L>  |  102  |  34<H>  ----------------------------<  222<H>  4.6   |  19<L>  |  1.27    Ca    9.8      09 Dec 2023 00:59  Phos  3.5     12-09  Mg     1.9     12-09    TPro  6.7  /  Alb  3.9  /  TBili  0.2  /  DBili  x   /  AST  45<H>  /  ALT  116<H>  /  AlkPhos  61  12-09    < from: CT Abdomen and Pelvis No Cont (11.28.23 @ 03:38) >  IMPRESSION:  Mildly dilated colon and prominent but not overly dilated small bowel   with air-fluid levels. No discrete transition point. Findings are   suggestive of ileus.    Intra-aortic balloon pump with the inferior marker at the level of the   inferior mesenteric artery and the balloon overlying the origins of the   renal arteries, celiac artery, and superior mesenteric artery. Consider   repositioning. Concern for IABP positioning was discussed with LANETTE Hawthorne   on 11/28/2023 at 3:42 AM by Dr. Shepard.    < end of copied text >          < from: Colonoscopy (11.17.23 @ 10:35) >                                                                                                        Impression:          - The entire examined colon is normal on direct and retroflexion views.                       - No specimens collected.  Recommendation:      - Resume previous diet today.                       - No large polyps or masses detected, No objection from GI standpoint to                 proceed with heart transplantation/advanced heart therapies.                       - Please call back should any further questions or concerns arise.    < end of copied text >     < from: Xray Chest 1 View- PORTABLE-Urgent (11.01.23 @ 07:42) >    IMPRESSION:  Clear lungs.    ---End of Report ---        < end of copied text >  < from: TTE W or WO Ultrasound Enhancing Agent (11.01.23 @ 10:23) >  _____________________________     CONCLUSIONS:      1. Left ventricular cavity is moderately dilated. Left ventricular wall thickness is normal. Left ventricular systolic function is severely decreased with an ejection fraction of 32 % by Chinchilla's method of disks. Regional wall motion abnormalities present.   2. Multiple segmental abnormalities exist. See findings.   3. There is moderate (grade 2) left ventricular diastolic dysfunction, with indeterminant filling pressure.   4. Normal right ventricular cavity size, wall thickness, and systolic function.   5. No significant valvular disease.   6. No pericardial effusion seen.   7. Compared to the transthoracic echocardiogram performed on 1/25/2017 the areas of akinesis are unchanged but there has been a decline in LV systolic function with new areas of hypokinesis.    __________________________________________________________________    < end of copied text >  < from: TTE Limited W or WO Ultrasound Enhancing Agent (11.02.23 @ 07:41) >  __________________________     CONCLUSIONS:      1. After obtaining consent, Definity ultrasound enhancing agent was given for enhanced left ventricular opacification and improved delineation of the left ventricular endocardial borders. Left ventricular systolic function is severely decreased with a calculated ejection fraction of 22 % by the Chinchilla's biplane method of disks. There is a left ventricular thrombus.   2. Findings were discussed with Litzy BOSS on 11/2/2023 at 8.49am.   3. There is a left ventricular thrombus.    _________________________________________________________________    < end of copied text >   LD VALENCIA  58y Male  MRN:[id number]    Patient is a 58y old  Male who presents with a chief complaint of NSTEMI (01 Nov 2023 20:29)    HPI:  57yo M w/ hx HTN, CAD w/ 1 stent in 2009, ICH (2008) presenting with abn ekg. Patient presented to [facility] where he was found to have STEMI, recommended to get cath however patient did not want to get it there so it left and came here.  Patient initially had cough, congestion, fever, was placed on antibiotics on Sunday.  Started feeling nauseous and had a presyncopal event after which he presented to ED last night.  Had chest pain as well.  Chest pain is midsternal.  Not currently having chest pain.  Received 4 aspirin 30 min pta. (01 Nov 2023 15:11)      Patient seen and evaluated at bedside in CCU. interval events noted    Interval HPI: remain in ccu. IABP in place        PAST MEDICAL & SURGICAL HISTORY:  HTN (hypertension)      CAD (coronary artery disease)  2009; stent      Intracranial hemorrhage  2008      Respiratory arrest  december 1st      Myocardial infarction, unspecified MI type, unspecified artery      History of coronary artery stent placement          REVIEW OF SYSTEMS:  as per hpi     VITALS:   ICU Vital Signs Last 24 Hrs  T(C): 36.7 (09 Dec 2023 19:00), Max: 37.2 (08 Dec 2023 23:00)  T(F): 98 (09 Dec 2023 19:00), Max: 99 (08 Dec 2023 23:00)  HR: 82 (09 Dec 2023 22:00) (78 - 92)  BP: --  BP(mean): --  ABP: --  ABP(mean): --  RR: 23 (09 Dec 2023 22:00) (15 - 27)  SpO2: 99% (09 Dec 2023 22:00) (96% - 100%)    O2 Parameters below as of 09 Dec 2023 22:00  Patient On (Oxygen Delivery Method): room air            PHYSICAL EXAM:  GENERAL: NAD, comfortable   HEAD:  Atraumatic, Normocephalic  EYES: EOMI, PERRLA, conjunctiva and sclera clear  NECK: Supple, No JVD   CHEST/LUNG: Clear to auscultation bilaterally; No wheeze  HEART: Regular rate and rhythm; No murmurs, rubs, or gallops  ABDOMEN: Soft, Nontender, Nondistended; Bowel sounds present  EXTREMITIES:  2+ Peripheral Pulses, No clubbing, cyanosis, or edema  NEUROLOGY: nonfocal  SKIN: +rash  +iabp    Consultant(s) Notes Reviewed:  [x ] YES  [ ] NO  Care Discussed with Consultants/Other Providers [ x] YES  [ ] NO    MEDS:   MEDICATIONS  (STANDING):  aspirin  chewable 81 milliGRAM(s) Oral daily  atorvastatin 80 milliGRAM(s) Oral at bedtime  budesonide  80 MICROgram(s)/formoterol 4.5 MICROgram(s) Inhaler 2 Puff(s) Inhalation two times a day  carvedilol 25 milliGRAM(s) Oral every 12 hours  chlorhexidine 2% Cloths 1 Application(s) Topical daily  clobetasol 0.05% Ointment 1 Application(s) Topical two times a day  heparin  Infusion 1300 Unit(s)/Hr (14 mL/Hr) IV Continuous <Continuous>  hydrALAZINE 75 milliGRAM(s) Oral three times a day  insulin glargine Injectable (LANTUS) 12 Unit(s) SubCutaneous at bedtime  insulin lispro (ADMELOG) corrective regimen sliding scale   SubCutaneous three times a day before meals  insulin lispro (ADMELOG) corrective regimen sliding scale   SubCutaneous at bedtime  insulin lispro Injectable (ADMELOG) 9 Unit(s) SubCutaneous three times a day before meals  isosorbide   dinitrate Tablet (ISORDIL) 30 milliGRAM(s) Oral three times a day  polyethylene glycol 3350 17 Gram(s) Oral two times a day  senna 2 Tablet(s) Oral at bedtime    MEDICATIONS  (PRN):  hydrOXYzine hydrochloride 25 milliGRAM(s) Oral two times a day PRN Anxiety      ALLERGIES:  penicillins (Unknown)      LABS:                                        11.6   8.08  )-----------( 131      ( 09 Dec 2023 00:59 )             35.9   12-09    133<L>  |  102  |  34<H>  ----------------------------<  222<H>  4.6   |  19<L>  |  1.27    Ca    9.8      09 Dec 2023 00:59  Phos  3.5     12-09  Mg     1.9     12-09    TPro  6.7  /  Alb  3.9  /  TBili  0.2  /  DBili  x   /  AST  45<H>  /  ALT  116<H>  /  AlkPhos  61  12-09    < from: CT Abdomen and Pelvis No Cont (11.28.23 @ 03:38) >  IMPRESSION:  Mildly dilated colon and prominent but not overly dilated small bowel   with air-fluid levels. No discrete transition point. Findings are   suggestive of ileus.    Intra-aortic balloon pump with the inferior marker at the level of the   inferior mesenteric artery and the balloon overlying the origins of the   renal arteries, celiac artery, and superior mesenteric artery. Consider   repositioning. Concern for IABP positioning was discussed with LANETTE Hawthorne   on 11/28/2023 at 3:42 AM by Dr. Shepard.    < end of copied text >          < from: Colonoscopy (11.17.23 @ 10:35) >                                                                                                        Impression:          - The entire examined colon is normal on direct and retroflexion views.                       - No specimens collected.  Recommendation:      - Resume previous diet today.                       - No large polyps or masses detected, No objection from GI standpoint to                 proceed with heart transplantation/advanced heart therapies.                       - Please call back should any further questions or concerns arise.    < end of copied text >     < from: Xray Chest 1 View- PORTABLE-Urgent (11.01.23 @ 07:42) >    IMPRESSION:  Clear lungs.    ---End of Report ---        < end of copied text >  < from: TTE W or WO Ultrasound Enhancing Agent (11.01.23 @ 10:23) >  _____________________________     CONCLUSIONS:      1. Left ventricular cavity is moderately dilated. Left ventricular wall thickness is normal. Left ventricular systolic function is severely decreased with an ejection fraction of 32 % by Chinchilla's method of disks. Regional wall motion abnormalities present.   2. Multiple segmental abnormalities exist. See findings.   3. There is moderate (grade 2) left ventricular diastolic dysfunction, with indeterminant filling pressure.   4. Normal right ventricular cavity size, wall thickness, and systolic function.   5. No significant valvular disease.   6. No pericardial effusion seen.   7. Compared to the transthoracic echocardiogram performed on 1/25/2017 the areas of akinesis are unchanged but there has been a decline in LV systolic function with new areas of hypokinesis.    __________________________________________________________________    < end of copied text >  < from: TTE Limited W or WO Ultrasound Enhancing Agent (11.02.23 @ 07:41) >  __________________________     CONCLUSIONS:      1. After obtaining consent, Definity ultrasound enhancing agent was given for enhanced left ventricular opacification and improved delineation of the left ventricular endocardial borders. Left ventricular systolic function is severely decreased with a calculated ejection fraction of 22 % by the Chinchilla's biplane method of disks. There is a left ventricular thrombus.   2. Findings were discussed with Litzy BOSS on 11/2/2023 at 8.49am.   3. There is a left ventricular thrombus.    _________________________________________________________________    < end of copied text >

## 2023-12-09 NOTE — PROGRESS NOTE ADULT - SUBJECTIVE AND OBJECTIVE BOX
[facility] Division of Kidney Diseases & Hypertension  FOLLOW UP NOTE  [phone]--------------------------------------------------------------------------------  Chief Complaint:Non-ST elevation myocardial infarction (NSTEMI)        24 hour events/subjective:  No acute overnight events. No new complaints.   He is using clobetasol cream for skin rash with some improvement.   Pt denies SOB/ Constipation/ Diarrhea/ Nausea/ Vomiting/ abdominal pain/ chest pain/ tingling/ numbness.         PAST HISTORY  --------------------------------------------------------------------------------  No significant changes to PMH, PSH, FHx, SHx, unless otherwise noted    ALLERGIES & MEDICATIONS  --------------------------------------------------------------------------------  Allergies    penicillins (Unknown)    Intolerances      Standing Inpatient Medications  aspirin  chewable 81 milliGRAM(s) Oral daily  atorvastatin 80 milliGRAM(s) Oral at bedtime  budesonide  80 MICROgram(s)/formoterol 4.5 MICROgram(s) Inhaler 2 Puff(s) Inhalation two times a day  carvedilol 25 milliGRAM(s) Oral every 12 hours  chlorhexidine 2% Cloths 1 Application(s) Topical daily  clobetasol 0.05% Ointment 1 Application(s) Topical two times a day  heparin  Infusion 1300 Unit(s)/Hr IV Continuous <Continuous>  hydrALAZINE 75 milliGRAM(s) Oral three times a day  insulin glargine Injectable (LANTUS) 12 Unit(s) SubCutaneous at bedtime  insulin lispro (ADMELOG) corrective regimen sliding scale   SubCutaneous at bedtime  insulin lispro (ADMELOG) corrective regimen sliding scale   SubCutaneous three times a day before meals  insulin lispro Injectable (ADMELOG) 9 Unit(s) SubCutaneous three times a day before meals  isosorbide   dinitrate Tablet (ISORDIL) 30 milliGRAM(s) Oral three times a day  polyethylene glycol 3350 17 Gram(s) Oral two times a day  senna 2 Tablet(s) Oral at bedtime    PRN Inpatient Medications  hydrOXYzine hydrochloride 25 milliGRAM(s) Oral two times a day PRN      REVIEW OF SYSTEMS  --------------------------------------------------------------------------------  Per Above.     VITALS/PHYSICAL EXAM  --------------------------------------------------------------------------------  T(C): 36.8 (12-09-23 @ 03:00), Max: 37.2 (12-08-23 @ 23:00)  HR: 84 (12-09-23 @ 08:00) (76 - 92)  BP: --  RR: 18 (12-09-23 @ 08:00) (14 - 26)  SpO2: 99% (12-09-23 @ 08:00) (93% - 99%)  Wt(kg): --        12-08-23 @ 07:01  -  12-09-23 @ 07:00  --------------------------------------------------------  IN: 816 mL / OUT: 2100 mL / NET: -1284 mL    12-09-23 @ 07:01  -  12-09-23 @ 08:27  --------------------------------------------------------  IN: 14 mL / OUT: 0 mL / NET: 14 mL      Physical Exam:  Gen: NAD, well-appearing  HEENT: PERRL, supple neck, clear oropharynx  Pulm: CTA B/L  CV: S1S2; IABP +  Back: No spinal or CVA tenderness  Abd: +BS, soft, nontender/nondistended  : No suprapubic tenderness   Extremities: no bilateral LE edema noted.   Neuro: No focal deficits  Skin:  non- blanching, non- pruritic rash +  Vascular access: NA      LABS/STUDIES  --------------------------------------------------------------------------------              11.6   8.08  >-----------<  131      [12-09-23 @ 00:59]              35.9     133  |  102  |  34  ----------------------------<  222      [12-09-23 @ 00:59]  4.6   |  19  |  1.27        Ca     9.8     [12-09-23 @ 00:59]      Mg     1.9     [12-09-23 @ 00:59]      Phos  3.5     [12-09-23 @ 00:59]    TPro  6.7  /  Alb  3.9  /  TBili  0.2  /  DBili  x   /  AST  45  /  ALT  116  /  AlkPhos  61  [12-09-23 @ 00:59]    PT/INR: PT 11.2 , INR 1.07       [12-09-23 @ 00:59]  PTT: 80.3       [12-09-23 @ 02:28]      Creatinine Trend:  SCr 1.27 [12-09 @ 00:59]  SCr 1.21 [12-08 @ 01:23]  SCr 1.35 [12-07 @ 00:58]  SCr 1.41 [12-06 @ 01:49]  SCr 1.30 [12-05 @ 02:41]    Urinalysis - [12-09-23 @ 00:59]      Color  / Appearance  / SG  / pH       Gluc 222 / Ketone   / Bili  / Urobili        Blood  / Protein  / Leuk Est  / Nitrite       RBC  / WBC  / Hyaline  / Gran  / Sq Epi  / Non Sq Epi  / Bacteria            [facility] Division of Kidney Diseases & Hypertension  FOLLOW UP NOTE  [phone]--------------------------------------------------------------------------------  Chief Complaint:Non-ST elevation myocardial infarction (NSTEMI)        24 hour events/subjective:  No acute overnight events. No new complaints.   He is using clobetasol cream for skin rash with some improvement.   Pt denies SOB/ Constipation/ Diarrhea/ Nausea/ Vomiting/ abdominal pain/ chest pain/ tingling/ numbness.         PAST HISTORY  --------------------------------------------------------------------------------  No significant changes to PMH, PSH, FHx, SHx, unless otherwise noted    ALLERGIES & MEDICATIONS  --------------------------------------------------------------------------------  Allergies    penicillins (Unknown)    Intolerances      Standing Inpatient Medications  aspirin  chewable 81 milliGRAM(s) Oral daily  atorvastatin 80 milliGRAM(s) Oral at bedtime  budesonide  80 MICROgram(s)/formoterol 4.5 MICROgram(s) Inhaler 2 Puff(s) Inhalation two times a day  carvedilol 25 milliGRAM(s) Oral every 12 hours  chlorhexidine 2% Cloths 1 Application(s) Topical daily  clobetasol 0.05% Ointment 1 Application(s) Topical two times a day  heparin  Infusion 1300 Unit(s)/Hr IV Continuous <Continuous>  hydrALAZINE 75 milliGRAM(s) Oral three times a day  insulin glargine Injectable (LANTUS) 12 Unit(s) SubCutaneous at bedtime  insulin lispro (ADMELOG) corrective regimen sliding scale   SubCutaneous at bedtime  insulin lispro (ADMELOG) corrective regimen sliding scale   SubCutaneous three times a day before meals  insulin lispro Injectable (ADMELOG) 9 Unit(s) SubCutaneous three times a day before meals  isosorbide   dinitrate Tablet (ISORDIL) 30 milliGRAM(s) Oral three times a day  polyethylene glycol 3350 17 Gram(s) Oral two times a day  senna 2 Tablet(s) Oral at bedtime    PRN Inpatient Medications  hydrOXYzine hydrochloride 25 milliGRAM(s) Oral two times a day PRN      REVIEW OF SYSTEMS  --------------------------------------------------------------------------------  Per Above.     VITALS/PHYSICAL EXAM  --------------------------------------------------------------------------------  T(C): 36.8 (12-09-23 @ 03:00), Max: 37.2 (12-08-23 @ 23:00)  HR: 84 (12-09-23 @ 08:00) (76 - 92)  BP: --  RR: 18 (12-09-23 @ 08:00) (14 - 26)  SpO2: 99% (12-09-23 @ 08:00) (93% - 99%)  Wt(kg): --        12-08-23 @ 07:01  -  12-09-23 @ 07:00  --------------------------------------------------------  IN: 816 mL / OUT: 2100 mL / NET: -1284 mL    12-09-23 @ 07:01  -  12-09-23 @ 08:27  --------------------------------------------------------  IN: 14 mL / OUT: 0 mL / NET: 14 mL      Physical Exam:  Gen: NAD, well-appearing  HEENT: PERRL, supple neck, clear oropharynx  Pulm: CTA B/L  CV: S1S2; IABP +  Back: No spinal or CVA tenderness  Abd: +BS, soft, nontender/nondistended  : No suprapubic tenderness   Extremities: no bilateral LE edema noted.   Neuro: No focal deficits  Skin:  non- blanching, non- pruritic rash +  Vascular access: NA      LABS/STUDIES  --------------------------------------------------------------------------------              11.6   8.08  >-----------<  131      [12-09-23 @ 00:59]              35.9     133  |  102  |  34  ----------------------------<  222      [12-09-23 @ 00:59]  4.6   |  19  |  1.27        Ca     9.8     [12-09-23 @ 00:59]      Mg     1.9     [12-09-23 @ 00:59]      Phos  3.5     [12-09-23 @ 00:59]    TPro  6.7  /  Alb  3.9  /  TBili  0.2  /  DBili  x   /  AST  45  /  ALT  116  /  AlkPhos  61  [12-09-23 @ 00:59]    PT/INR: PT 11.2 , INR 1.07       [12-09-23 @ 00:59]  PTT: 80.3       [12-09-23 @ 02:28]      Creatinine Trend:  SCr 1.27 [12-09 @ 00:59]  SCr 1.21 [12-08 @ 01:23]  SCr 1.35 [12-07 @ 00:58]  SCr 1.41 [12-06 @ 01:49]  SCr 1.30 [12-05 @ 02:41]    Urinalysis - [12-09-23 @ 00:59]      Color  / Appearance  / SG  / pH       Gluc 222 / Ketone   / Bili  / Urobili        Blood  / Protein  / Leuk Est  / Nitrite       RBC  / WBC  / Hyaline  / Gran  / Sq Epi  / Non Sq Epi  / Bacteria

## 2023-12-09 NOTE — PROGRESS NOTE ADULT - PROBLEM SELECTOR PLAN 1
ARIC: in the setting of heart failure, COVID infection. At the time of presentation Scr is 1.25. Started to worsen on 11/4 and peaked at 4.07 11/10 and gradually improved to 1.8 on 11/18. Pt had LHC on 11/1. He has hx of DM with proteinuria of 684 but most recent UA negative. Primary team is planning to get cardiac transplant eval. Remains nonoliguric, UOP 1.7L/24h. HIV, Hep B, Hep C negative. A1c - 8.3. Complements are not low. BETZAIDA and ANCA negative. Light chains - not significant. UPCR <0.1, PLA2R ab - negative, Anti GBM abs - negative. UPEP negative. Duplex - normal, symmetric blood flow throughout both kidneys. Velocities and RIs are limited by IABP.     Serum creatinine is 1.27 today. Pts Hemodynamics are managed by cardio team and pt remains on IABP.    PLAN:  No indication for dialysis.  Avoid nephrotoxic agents. Dose meds per eGFR.

## 2023-12-09 NOTE — PROGRESS NOTE ADULT - PROBLEM SELECTOR PLAN 1
-Listen UNOS status 2.   - L IABP at 1:1, placed 11/9. Exchange to LFA given high grade bacteremia on 11/20. On AC with Heparin infusion (also for LV thrombus)  - Continue Coreg 25 mg BID hold for MAP <65  - Continue HDZN 75 mg TID and ISDN 30 mg TID, hold for MAP <65  - Off romel given HyperK  -allow for more liberal PO liquid intake  - AT evaluation: Accepted for transplant, currently listed UNOS Status 2. ABO A. PRA 0% 11/13. Last Brilinta dose was on 11/13, However, downgraded to status 7 on 12/4 given + blood culture. But now re-upgraded to status 2.   -No further vancomycin perTransplant ID recs.   - Please keep primary Dr. Banuelos [phone] in the loop per family request. -Listen UNOS status 2.   - L IABP at 1:1, placed 11/9. Exchange to LFA given high grade bacteremia on 11/20. On AC with Heparin infusion (also for LV thrombus)  - Continue Coreg 25 mg BID hold for MAP <65  - Continue HDZN 75 mg TID and ISDN 30 mg TID, hold for MAP <65  - Off romel given HyperK  -allow for more liberal PO liquid intake  - AT evaluation: Accepted for transplant, currently listed UNOS Status 2. ABO A. PRA 0% 11/13. Last Brilinta dose was on 11/13, However, downgraded to status 7 on 12/4 given + blood culture. But now re-upgraded to status 2.   -No further vancomycin perTransplant ID recs.   - Please keep primary Dr. Banuelos [phone] in the loop per family request.

## 2023-12-10 LAB
ALBUMIN SERPL ELPH-MCNC: 3.6 G/DL — SIGNIFICANT CHANGE UP (ref 3.3–5)
ALBUMIN SERPL ELPH-MCNC: 3.6 G/DL — SIGNIFICANT CHANGE UP (ref 3.3–5)
ALBUMIN SERPL ELPH-MCNC: 3.8 G/DL — SIGNIFICANT CHANGE UP (ref 3.3–5)
ALBUMIN SERPL ELPH-MCNC: 3.8 G/DL — SIGNIFICANT CHANGE UP (ref 3.3–5)
ALP SERPL-CCNC: 60 U/L — SIGNIFICANT CHANGE UP (ref 40–120)
ALP SERPL-CCNC: 60 U/L — SIGNIFICANT CHANGE UP (ref 40–120)
ALP SERPL-CCNC: 66 U/L — SIGNIFICANT CHANGE UP (ref 40–120)
ALP SERPL-CCNC: 66 U/L — SIGNIFICANT CHANGE UP (ref 40–120)
ALT FLD-CCNC: 127 U/L — HIGH (ref 10–45)
ALT FLD-CCNC: 127 U/L — HIGH (ref 10–45)
ALT FLD-CCNC: 136 U/L — HIGH (ref 10–45)
ALT FLD-CCNC: 136 U/L — HIGH (ref 10–45)
ANION GAP SERPL CALC-SCNC: 13 MMOL/L — SIGNIFICANT CHANGE UP (ref 5–17)
ANION GAP SERPL CALC-SCNC: 13 MMOL/L — SIGNIFICANT CHANGE UP (ref 5–17)
ANION GAP SERPL CALC-SCNC: 9 MMOL/L — SIGNIFICANT CHANGE UP (ref 5–17)
ANION GAP SERPL CALC-SCNC: 9 MMOL/L — SIGNIFICANT CHANGE UP (ref 5–17)
APTT BLD: 60.1 SEC — HIGH (ref 24.5–35.6)
APTT BLD: 60.1 SEC — HIGH (ref 24.5–35.6)
AST SERPL-CCNC: 41 U/L — HIGH (ref 10–40)
AST SERPL-CCNC: 41 U/L — HIGH (ref 10–40)
AST SERPL-CCNC: 46 U/L — HIGH (ref 10–40)
AST SERPL-CCNC: 46 U/L — HIGH (ref 10–40)
BASOPHILS # BLD AUTO: 0.02 K/UL — SIGNIFICANT CHANGE UP (ref 0–0.2)
BASOPHILS # BLD AUTO: 0.02 K/UL — SIGNIFICANT CHANGE UP (ref 0–0.2)
BASOPHILS # BLD AUTO: 0.03 K/UL — SIGNIFICANT CHANGE UP (ref 0–0.2)
BASOPHILS # BLD AUTO: 0.03 K/UL — SIGNIFICANT CHANGE UP (ref 0–0.2)
BASOPHILS NFR BLD AUTO: 0.2 % — SIGNIFICANT CHANGE UP (ref 0–2)
BASOPHILS NFR BLD AUTO: 0.2 % — SIGNIFICANT CHANGE UP (ref 0–2)
BASOPHILS NFR BLD AUTO: 0.3 % — SIGNIFICANT CHANGE UP (ref 0–2)
BASOPHILS NFR BLD AUTO: 0.3 % — SIGNIFICANT CHANGE UP (ref 0–2)
BILIRUB SERPL-MCNC: 0.2 MG/DL — SIGNIFICANT CHANGE UP (ref 0.2–1.2)
BILIRUB SERPL-MCNC: 0.2 MG/DL — SIGNIFICANT CHANGE UP (ref 0.2–1.2)
BILIRUB SERPL-MCNC: 0.3 MG/DL — SIGNIFICANT CHANGE UP (ref 0.2–1.2)
BILIRUB SERPL-MCNC: 0.3 MG/DL — SIGNIFICANT CHANGE UP (ref 0.2–1.2)
BUN SERPL-MCNC: 29 MG/DL — HIGH (ref 7–23)
BUN SERPL-MCNC: 29 MG/DL — HIGH (ref 7–23)
BUN SERPL-MCNC: 32 MG/DL — HIGH (ref 7–23)
BUN SERPL-MCNC: 32 MG/DL — HIGH (ref 7–23)
CALCIUM SERPL-MCNC: 9.4 MG/DL — SIGNIFICANT CHANGE UP (ref 8.4–10.5)
CALCIUM SERPL-MCNC: 9.4 MG/DL — SIGNIFICANT CHANGE UP (ref 8.4–10.5)
CALCIUM SERPL-MCNC: 9.7 MG/DL — SIGNIFICANT CHANGE UP (ref 8.4–10.5)
CALCIUM SERPL-MCNC: 9.7 MG/DL — SIGNIFICANT CHANGE UP (ref 8.4–10.5)
CHLORIDE SERPL-SCNC: 105 MMOL/L — SIGNIFICANT CHANGE UP (ref 96–108)
CHLORIDE SERPL-SCNC: 105 MMOL/L — SIGNIFICANT CHANGE UP (ref 96–108)
CHLORIDE SERPL-SCNC: 106 MMOL/L — SIGNIFICANT CHANGE UP (ref 96–108)
CHLORIDE SERPL-SCNC: 106 MMOL/L — SIGNIFICANT CHANGE UP (ref 96–108)
CO2 SERPL-SCNC: 17 MMOL/L — LOW (ref 22–31)
CO2 SERPL-SCNC: 17 MMOL/L — LOW (ref 22–31)
CO2 SERPL-SCNC: 21 MMOL/L — LOW (ref 22–31)
CO2 SERPL-SCNC: 21 MMOL/L — LOW (ref 22–31)
CREAT SERPL-MCNC: 1.06 MG/DL — SIGNIFICANT CHANGE UP (ref 0.5–1.3)
CREAT SERPL-MCNC: 1.06 MG/DL — SIGNIFICANT CHANGE UP (ref 0.5–1.3)
CREAT SERPL-MCNC: 1.17 MG/DL — SIGNIFICANT CHANGE UP (ref 0.5–1.3)
CREAT SERPL-MCNC: 1.17 MG/DL — SIGNIFICANT CHANGE UP (ref 0.5–1.3)
EGFR: 72 ML/MIN/1.73M2 — SIGNIFICANT CHANGE UP
EGFR: 72 ML/MIN/1.73M2 — SIGNIFICANT CHANGE UP
EGFR: 81 ML/MIN/1.73M2 — SIGNIFICANT CHANGE UP
EGFR: 81 ML/MIN/1.73M2 — SIGNIFICANT CHANGE UP
EOSINOPHIL # BLD AUTO: 0.71 K/UL — HIGH (ref 0–0.5)
EOSINOPHIL # BLD AUTO: 0.71 K/UL — HIGH (ref 0–0.5)
EOSINOPHIL # BLD AUTO: 0.74 K/UL — HIGH (ref 0–0.5)
EOSINOPHIL # BLD AUTO: 0.74 K/UL — HIGH (ref 0–0.5)
EOSINOPHIL NFR BLD AUTO: 7.3 % — HIGH (ref 0–6)
EOSINOPHIL NFR BLD AUTO: 7.3 % — HIGH (ref 0–6)
EOSINOPHIL NFR BLD AUTO: 7.8 % — HIGH (ref 0–6)
EOSINOPHIL NFR BLD AUTO: 7.8 % — HIGH (ref 0–6)
GLUCOSE BLDC GLUCOMTR-MCNC: 133 MG/DL — HIGH (ref 70–99)
GLUCOSE BLDC GLUCOMTR-MCNC: 133 MG/DL — HIGH (ref 70–99)
GLUCOSE BLDC GLUCOMTR-MCNC: 154 MG/DL — HIGH (ref 70–99)
GLUCOSE BLDC GLUCOMTR-MCNC: 154 MG/DL — HIGH (ref 70–99)
GLUCOSE BLDC GLUCOMTR-MCNC: 160 MG/DL — HIGH (ref 70–99)
GLUCOSE BLDC GLUCOMTR-MCNC: 160 MG/DL — HIGH (ref 70–99)
GLUCOSE BLDC GLUCOMTR-MCNC: 206 MG/DL — HIGH (ref 70–99)
GLUCOSE BLDC GLUCOMTR-MCNC: 206 MG/DL — HIGH (ref 70–99)
GLUCOSE SERPL-MCNC: 170 MG/DL — HIGH (ref 70–99)
GLUCOSE SERPL-MCNC: 170 MG/DL — HIGH (ref 70–99)
GLUCOSE SERPL-MCNC: 260 MG/DL — HIGH (ref 70–99)
GLUCOSE SERPL-MCNC: 260 MG/DL — HIGH (ref 70–99)
HCT VFR BLD CALC: 34.8 % — LOW (ref 39–50)
HCT VFR BLD CALC: 34.8 % — LOW (ref 39–50)
HCT VFR BLD CALC: 37.2 % — LOW (ref 39–50)
HCT VFR BLD CALC: 37.2 % — LOW (ref 39–50)
HGB BLD-MCNC: 11.3 G/DL — LOW (ref 13–17)
HGB BLD-MCNC: 11.3 G/DL — LOW (ref 13–17)
HGB BLD-MCNC: 11.9 G/DL — LOW (ref 13–17)
HGB BLD-MCNC: 11.9 G/DL — LOW (ref 13–17)
IMM GRANULOCYTES NFR BLD AUTO: 0.4 % — SIGNIFICANT CHANGE UP (ref 0–0.9)
INR BLD: 1.04 RATIO — SIGNIFICANT CHANGE UP (ref 0.85–1.18)
INR BLD: 1.04 RATIO — SIGNIFICANT CHANGE UP (ref 0.85–1.18)
LACTATE SERPL-SCNC: 1.9 MMOL/L — SIGNIFICANT CHANGE UP (ref 0.5–2)
LACTATE SERPL-SCNC: 1.9 MMOL/L — SIGNIFICANT CHANGE UP (ref 0.5–2)
LYMPHOCYTES # BLD AUTO: 1.31 K/UL — SIGNIFICANT CHANGE UP (ref 1–3.3)
LYMPHOCYTES # BLD AUTO: 1.31 K/UL — SIGNIFICANT CHANGE UP (ref 1–3.3)
LYMPHOCYTES # BLD AUTO: 1.37 K/UL — SIGNIFICANT CHANGE UP (ref 1–3.3)
LYMPHOCYTES # BLD AUTO: 1.37 K/UL — SIGNIFICANT CHANGE UP (ref 1–3.3)
LYMPHOCYTES # BLD AUTO: 13.4 % — SIGNIFICANT CHANGE UP (ref 13–44)
LYMPHOCYTES # BLD AUTO: 13.4 % — SIGNIFICANT CHANGE UP (ref 13–44)
LYMPHOCYTES # BLD AUTO: 14.5 % — SIGNIFICANT CHANGE UP (ref 13–44)
LYMPHOCYTES # BLD AUTO: 14.5 % — SIGNIFICANT CHANGE UP (ref 13–44)
MAGNESIUM SERPL-MCNC: 1.8 MG/DL — SIGNIFICANT CHANGE UP (ref 1.6–2.6)
MAGNESIUM SERPL-MCNC: 1.8 MG/DL — SIGNIFICANT CHANGE UP (ref 1.6–2.6)
MAGNESIUM SERPL-MCNC: 1.9 MG/DL — SIGNIFICANT CHANGE UP (ref 1.6–2.6)
MAGNESIUM SERPL-MCNC: 1.9 MG/DL — SIGNIFICANT CHANGE UP (ref 1.6–2.6)
MCHC RBC-ENTMCNC: 29.7 PG — SIGNIFICANT CHANGE UP (ref 27–34)
MCHC RBC-ENTMCNC: 29.7 PG — SIGNIFICANT CHANGE UP (ref 27–34)
MCHC RBC-ENTMCNC: 30.1 PG — SIGNIFICANT CHANGE UP (ref 27–34)
MCHC RBC-ENTMCNC: 30.1 PG — SIGNIFICANT CHANGE UP (ref 27–34)
MCHC RBC-ENTMCNC: 32 GM/DL — SIGNIFICANT CHANGE UP (ref 32–36)
MCHC RBC-ENTMCNC: 32 GM/DL — SIGNIFICANT CHANGE UP (ref 32–36)
MCHC RBC-ENTMCNC: 32.5 GM/DL — SIGNIFICANT CHANGE UP (ref 32–36)
MCHC RBC-ENTMCNC: 32.5 GM/DL — SIGNIFICANT CHANGE UP (ref 32–36)
MCV RBC AUTO: 92.8 FL — SIGNIFICANT CHANGE UP (ref 80–100)
MONOCYTES # BLD AUTO: 0.52 K/UL — SIGNIFICANT CHANGE UP (ref 0–0.9)
MONOCYTES # BLD AUTO: 0.52 K/UL — SIGNIFICANT CHANGE UP (ref 0–0.9)
MONOCYTES # BLD AUTO: 0.58 K/UL — SIGNIFICANT CHANGE UP (ref 0–0.9)
MONOCYTES # BLD AUTO: 0.58 K/UL — SIGNIFICANT CHANGE UP (ref 0–0.9)
MONOCYTES NFR BLD AUTO: 5.5 % — SIGNIFICANT CHANGE UP (ref 2–14)
MONOCYTES NFR BLD AUTO: 5.5 % — SIGNIFICANT CHANGE UP (ref 2–14)
MONOCYTES NFR BLD AUTO: 5.9 % — SIGNIFICANT CHANGE UP (ref 2–14)
MONOCYTES NFR BLD AUTO: 5.9 % — SIGNIFICANT CHANGE UP (ref 2–14)
NEUTROPHILS # BLD AUTO: 6.77 K/UL — SIGNIFICANT CHANGE UP (ref 1.8–7.4)
NEUTROPHILS # BLD AUTO: 6.77 K/UL — SIGNIFICANT CHANGE UP (ref 1.8–7.4)
NEUTROPHILS # BLD AUTO: 7.11 K/UL — SIGNIFICANT CHANGE UP (ref 1.8–7.4)
NEUTROPHILS # BLD AUTO: 7.11 K/UL — SIGNIFICANT CHANGE UP (ref 1.8–7.4)
NEUTROPHILS NFR BLD AUTO: 71.6 % — SIGNIFICANT CHANGE UP (ref 43–77)
NEUTROPHILS NFR BLD AUTO: 71.6 % — SIGNIFICANT CHANGE UP (ref 43–77)
NEUTROPHILS NFR BLD AUTO: 72.7 % — SIGNIFICANT CHANGE UP (ref 43–77)
NEUTROPHILS NFR BLD AUTO: 72.7 % — SIGNIFICANT CHANGE UP (ref 43–77)
NRBC # BLD: 0 /100 WBCS — SIGNIFICANT CHANGE UP (ref 0–0)
PHOSPHATE SERPL-MCNC: 2.4 MG/DL — LOW (ref 2.5–4.5)
PHOSPHATE SERPL-MCNC: 2.4 MG/DL — LOW (ref 2.5–4.5)
PHOSPHATE SERPL-MCNC: 2.9 MG/DL — SIGNIFICANT CHANGE UP (ref 2.5–4.5)
PHOSPHATE SERPL-MCNC: 2.9 MG/DL — SIGNIFICANT CHANGE UP (ref 2.5–4.5)
PLATELET # BLD AUTO: 126 K/UL — LOW (ref 150–400)
PLATELET # BLD AUTO: 126 K/UL — LOW (ref 150–400)
PLATELET # BLD AUTO: 135 K/UL — LOW (ref 150–400)
PLATELET # BLD AUTO: 135 K/UL — LOW (ref 150–400)
POTASSIUM SERPL-MCNC: 3.7 MMOL/L — SIGNIFICANT CHANGE UP (ref 3.5–5.3)
POTASSIUM SERPL-MCNC: 3.7 MMOL/L — SIGNIFICANT CHANGE UP (ref 3.5–5.3)
POTASSIUM SERPL-MCNC: 4.2 MMOL/L — SIGNIFICANT CHANGE UP (ref 3.5–5.3)
POTASSIUM SERPL-MCNC: 4.2 MMOL/L — SIGNIFICANT CHANGE UP (ref 3.5–5.3)
POTASSIUM SERPL-SCNC: 3.7 MMOL/L — SIGNIFICANT CHANGE UP (ref 3.5–5.3)
POTASSIUM SERPL-SCNC: 3.7 MMOL/L — SIGNIFICANT CHANGE UP (ref 3.5–5.3)
POTASSIUM SERPL-SCNC: 4.2 MMOL/L — SIGNIFICANT CHANGE UP (ref 3.5–5.3)
POTASSIUM SERPL-SCNC: 4.2 MMOL/L — SIGNIFICANT CHANGE UP (ref 3.5–5.3)
PROT SERPL-MCNC: 6.4 G/DL — SIGNIFICANT CHANGE UP (ref 6–8.3)
PROT SERPL-MCNC: 6.4 G/DL — SIGNIFICANT CHANGE UP (ref 6–8.3)
PROT SERPL-MCNC: 6.7 G/DL — SIGNIFICANT CHANGE UP (ref 6–8.3)
PROT SERPL-MCNC: 6.7 G/DL — SIGNIFICANT CHANGE UP (ref 6–8.3)
PROTHROM AB SERPL-ACNC: 11.4 SEC — SIGNIFICANT CHANGE UP (ref 9.5–13)
PROTHROM AB SERPL-ACNC: 11.4 SEC — SIGNIFICANT CHANGE UP (ref 9.5–13)
RBC # BLD: 3.75 M/UL — LOW (ref 4.2–5.8)
RBC # BLD: 3.75 M/UL — LOW (ref 4.2–5.8)
RBC # BLD: 4.01 M/UL — LOW (ref 4.2–5.8)
RBC # BLD: 4.01 M/UL — LOW (ref 4.2–5.8)
RBC # FLD: 15.9 % — HIGH (ref 10.3–14.5)
RBC # FLD: 15.9 % — HIGH (ref 10.3–14.5)
RBC # FLD: 16 % — HIGH (ref 10.3–14.5)
RBC # FLD: 16 % — HIGH (ref 10.3–14.5)
SARS-COV-2 RNA SPEC QL NAA+PROBE: SIGNIFICANT CHANGE UP
SARS-COV-2 RNA SPEC QL NAA+PROBE: SIGNIFICANT CHANGE UP
SODIUM SERPL-SCNC: 135 MMOL/L — SIGNIFICANT CHANGE UP (ref 135–145)
SODIUM SERPL-SCNC: 135 MMOL/L — SIGNIFICANT CHANGE UP (ref 135–145)
SODIUM SERPL-SCNC: 136 MMOL/L — SIGNIFICANT CHANGE UP (ref 135–145)
SODIUM SERPL-SCNC: 136 MMOL/L — SIGNIFICANT CHANGE UP (ref 135–145)
WBC # BLD: 9.46 K/UL — SIGNIFICANT CHANGE UP (ref 3.8–10.5)
WBC # BLD: 9.46 K/UL — SIGNIFICANT CHANGE UP (ref 3.8–10.5)
WBC # BLD: 9.78 K/UL — SIGNIFICANT CHANGE UP (ref 3.8–10.5)
WBC # BLD: 9.78 K/UL — SIGNIFICANT CHANGE UP (ref 3.8–10.5)
WBC # FLD AUTO: 9.46 K/UL — SIGNIFICANT CHANGE UP (ref 3.8–10.5)
WBC # FLD AUTO: 9.46 K/UL — SIGNIFICANT CHANGE UP (ref 3.8–10.5)
WBC # FLD AUTO: 9.78 K/UL — SIGNIFICANT CHANGE UP (ref 3.8–10.5)
WBC # FLD AUTO: 9.78 K/UL — SIGNIFICANT CHANGE UP (ref 3.8–10.5)

## 2023-12-10 PROCEDURE — 71045 X-RAY EXAM CHEST 1 VIEW: CPT | Mod: 26

## 2023-12-10 PROCEDURE — 99292 CRITICAL CARE ADDL 30 MIN: CPT

## 2023-12-10 PROCEDURE — 93010 ELECTROCARDIOGRAM REPORT: CPT

## 2023-12-10 PROCEDURE — 71045 X-RAY EXAM CHEST 1 VIEW: CPT | Mod: 26,77

## 2023-12-10 PROCEDURE — 99291 CRITICAL CARE FIRST HOUR: CPT

## 2023-12-10 RX ORDER — POTASSIUM CHLORIDE 20 MEQ
20 PACKET (EA) ORAL ONCE
Refills: 0 | Status: DISCONTINUED | OUTPATIENT
Start: 2023-12-10 | End: 2023-12-10

## 2023-12-10 RX ORDER — MAGNESIUM SULFATE 500 MG/ML
2 VIAL (ML) INJECTION ONCE
Refills: 0 | Status: COMPLETED | OUTPATIENT
Start: 2023-12-10 | End: 2023-12-10

## 2023-12-10 RX ORDER — MAGNESIUM SULFATE 500 MG/ML
1 VIAL (ML) INJECTION ONCE
Refills: 0 | Status: DISCONTINUED | OUTPATIENT
Start: 2023-12-10 | End: 2023-12-10

## 2023-12-10 RX ORDER — MAGNESIUM SULFATE 500 MG/ML
1 VIAL (ML) INJECTION ONCE
Refills: 0 | Status: COMPLETED | OUTPATIENT
Start: 2023-12-10 | End: 2023-12-10

## 2023-12-10 RX ORDER — POTASSIUM CHLORIDE 20 MEQ
20 PACKET (EA) ORAL
Refills: 0 | Status: COMPLETED | OUTPATIENT
Start: 2023-12-10 | End: 2023-12-10

## 2023-12-10 RX ADMIN — INSULIN GLARGINE 12 UNIT(S): 100 INJECTION, SOLUTION SUBCUTANEOUS at 21:47

## 2023-12-10 RX ADMIN — ISOSORBIDE DINITRATE 30 MILLIGRAM(S): 5 TABLET ORAL at 11:58

## 2023-12-10 RX ADMIN — Medication 1: at 12:34

## 2023-12-10 RX ADMIN — Medication 75 MILLIGRAM(S): at 14:40

## 2023-12-10 RX ADMIN — ISOSORBIDE DINITRATE 30 MILLIGRAM(S): 5 TABLET ORAL at 17:10

## 2023-12-10 RX ADMIN — ISOSORBIDE DINITRATE 30 MILLIGRAM(S): 5 TABLET ORAL at 06:33

## 2023-12-10 RX ADMIN — Medication 75 MILLIGRAM(S): at 08:48

## 2023-12-10 RX ADMIN — CARVEDILOL PHOSPHATE 25 MILLIGRAM(S): 80 CAPSULE, EXTENDED RELEASE ORAL at 06:33

## 2023-12-10 RX ADMIN — BUDESONIDE AND FORMOTEROL FUMARATE DIHYDRATE 2 PUFF(S): 160; 4.5 AEROSOL RESPIRATORY (INHALATION) at 10:07

## 2023-12-10 RX ADMIN — Medication 2: at 17:02

## 2023-12-10 RX ADMIN — ATORVASTATIN CALCIUM 80 MILLIGRAM(S): 80 TABLET, FILM COATED ORAL at 21:18

## 2023-12-10 RX ADMIN — Medication 75 MILLIGRAM(S): at 21:18

## 2023-12-10 RX ADMIN — Medication 9 UNIT(S): at 12:34

## 2023-12-10 RX ADMIN — Medication 9 UNIT(S): at 08:49

## 2023-12-10 RX ADMIN — HEPARIN SODIUM 14 UNIT(S)/HR: 5000 INJECTION INTRAVENOUS; SUBCUTANEOUS at 19:36

## 2023-12-10 RX ADMIN — Medication 81 MILLIGRAM(S): at 11:58

## 2023-12-10 RX ADMIN — Medication 100 GRAM(S): at 01:18

## 2023-12-10 RX ADMIN — Medication 25 GRAM(S): at 18:48

## 2023-12-10 RX ADMIN — Medication 9 UNIT(S): at 17:02

## 2023-12-10 RX ADMIN — Medication 20 MILLIEQUIVALENT(S): at 19:36

## 2023-12-10 RX ADMIN — CARVEDILOL PHOSPHATE 25 MILLIGRAM(S): 80 CAPSULE, EXTENDED RELEASE ORAL at 17:56

## 2023-12-10 RX ADMIN — Medication 20 MILLIEQUIVALENT(S): at 21:18

## 2023-12-10 NOTE — PROGRESS NOTE ADULT - SUBJECTIVE AND OBJECTIVE BOX
CICU DAY NOTE  Admission date: 11/1/23  Chief complaint/ Diagnosis: CS  HPI: 59yo M w/ hx HTN, CAD w/ 1 stent in 2009, ICH (2008) presenting with abn ekg. Patient presented to [facility] where he was found to have STEMI, recommended to get cath however patient did not want to get it there so it left and came here.  Patient initially had cough, congestion, fever, was placed on antibiotics on Sunday.  Started feeling nauseous and had a presyncopal event after which he presented to ED last night.  Had chest pain as well.  Chest pain is midsternal.  Not currently having chest pain.  Received 4 aspirin 30 min pta. (01 Nov 2023 15:11)    Interval history: Uneventful overnight  IABP 1:1   remains hemodynamically stable     REVIEW OF SYSTEMS  Denies CP, Palpitation, SOB, Dyspnea [X  ] All other systems negative    MEDICATIONS  (STANDING)  aspirin  chewable 81 milliGRAM(s) Oral daily  atorvastatin 80 milliGRAM(s) Oral at bedtime  budesonide  80 MICROgram(s)/formoterol 4.5 MICROgram(s) Inhaler 2 Puff(s) Inhalation two times a day  carvedilol 25 milliGRAM(s) Oral every 12 hours  chlorhexidine 2% Cloths 1 Application(s) Topical daily  clobetasol 0.05% Ointment 1 Application(s) Topical two times a day  heparin  Infusion 1300 Unit(s)/Hr (14 mL/Hr) IV Continuous <Continuous>  hydrALAZINE 75 milliGRAM(s) Oral three times a day  insulin glargine Injectable (LANTUS) 12 Unit(s) SubCutaneous at bedtime  insulin lispro (ADMELOG) corrective regimen sliding scale   SubCutaneous three times a day before meals  insulin lispro (ADMELOG) corrective regimen sliding scale   SubCutaneous at bedtime  insulin lispro Injectable (ADMELOG) 9 Unit(s) SubCutaneous three times a day before meals  isosorbide   dinitrate Tablet (ISORDIL) 30 milliGRAM(s) Oral three times a day  polyethylene glycol 3350 17 Gram(s) Oral two times a day  senna 2 Tablet(s) Oral at bedtime    MEDICATIONS  (PRN):  hydrOXYzine hydrochloride 25 milliGRAM(s) Oral two times a day PRN Anxiety    PAST MEDICAL & SURGICAL HISTORY:  HTN (hypertension)  CAD (coronary artery disease) 2009; stent  Respiratory arrest december 1st  Myocardial infarction, unspecified MI type, unspecified artery  History of coronary artery stent placement    FAMILY HISTORY:  Family history of meningitis (Sibling)  Brother, death at age 49 from complications of infection (June 2015)  Family history of hypertension in mother    Allergy   penicillins (Unknown)    ICU Vital Signs Last 24 Hrs  T(C): 36.7 (Max: 36.7)  HR: 85 (78 - 88)  Aug 80's  RR: 23 (15 - 27)  SpO2: 99% (96% - 100%) on room air     I&O's Summary  IN: 848 mL / OUT: 1560 mL / NET: -712 mL    PHYSICAL EXAM  Appearance: Normal, NAD  HEAD:  Normocephalic  EYES:  PERRLA, conjunctiva and sclera clear  NECK: Supple, No JVD  CHEST/LUNG: Clear to auscultation bilaterally; No wheezing  HEART: Normal S1, S2. No murmurs, rubs, or gallops  ABDOMEN: + Bowel sounds, Soft, NT, ND   EXTREMITIES:  2+ Peripheral Pulses, No clubbing, cyanosis, or edema  NEUROLOGY: non-focal, aaox3  SKIN: No rashes or lesions    Interpretation of Telemetry:                        11.9   9.78  )-----------( 126                37.2       135  |  105  |  29<H>  --------------------------<  170<H>  4.2   |  21<L>  |  1.17    Ca    9.7     Phos  2.9     Mg     1.9 (repleted)   TPro  6.7  /  Alb  3.8  /  TBili  0.3  /  DBili  x   /  AST  41<H>  /  ALT  127<H>  /  AlkPhos  60   F/S 116-232  PTT 60.1     CICU DAY NOTE  Admission date: 11/1/23  Chief complaint/ Diagnosis: CS  HPI: 59yo M w/ hx HTN, CAD w/ 1 stent in 2009, ICH (2008) presenting with abn ekg. Patient presented to [facility] where he was found to have STEMI, recommended to get cath however patient did not want to get it there so it left and came here.  Patient initially had cough, congestion, fever, was placed on antibiotics on Sunday.  Started feeling nauseous and had a presyncopal event after which he presented to ED last night.  Had chest pain as well.  Chest pain is midsternal.  Not currently having chest pain.  Received 4 aspirin 30 min pta. (01 Nov 2023 15:11)    Interval history: Uneventful overnight  IABP 1:1   remains hemodynamically stable     REVIEW OF SYSTEMS  Denies CP, Palpitation, SOB, Dyspnea [X  ] All other systems negative    MEDICATIONS  (STANDING)  aspirin  chewable 81 milliGRAM(s) Oral daily  atorvastatin 80 milliGRAM(s) Oral at bedtime  budesonide  80 MICROgram(s)/formoterol 4.5 MICROgram(s) Inhaler 2 Puff(s) Inhalation two times a day  carvedilol 25 milliGRAM(s) Oral every 12 hours  chlorhexidine 2% Cloths 1 Application(s) Topical daily  clobetasol 0.05% Ointment 1 Application(s) Topical two times a day  heparin  Infusion 1300 Unit(s)/Hr (14 mL/Hr) IV Continuous <Continuous>  hydrALAZINE 75 milliGRAM(s) Oral three times a day  insulin glargine Injectable (LANTUS) 12 Unit(s) SubCutaneous at bedtime  insulin lispro (ADMELOG) corrective regimen sliding scale   SubCutaneous three times a day before meals  insulin lispro (ADMELOG) corrective regimen sliding scale   SubCutaneous at bedtime  insulin lispro Injectable (ADMELOG) 9 Unit(s) SubCutaneous three times a day before meals  isosorbide   dinitrate Tablet (ISORDIL) 30 milliGRAM(s) Oral three times a day  polyethylene glycol 3350 17 Gram(s) Oral two times a day  senna 2 Tablet(s) Oral at bedtime    MEDICATIONS  (PRN):  hydrOXYzine hydrochloride 25 milliGRAM(s) Oral two times a day PRN Anxiety    PAST MEDICAL & SURGICAL HISTORY:  HTN (hypertension)  CAD (coronary artery disease) 2009; stent  Respiratory arrest december 1st  Myocardial infarction, unspecified MI type, unspecified artery  History of coronary artery stent placement    FAMILY HISTORY:  Family history of meningitis (Sibling)  Brother, death at age 49 from complications of infection (June 2015)  Family history of hypertension in mother    Allergy   penicillins (Unknown)    ICU Vital Signs Last 24 Hrs  T(C): 36.7 (Max: 36.7)  HR: 85 (78 - 88)  Aug 80's  RR: 23 (15 - 27)  SpO2: 99% (96% - 100%) on room air     I&O's Summary  IN: 848 mL / OUT: 1560 mL / NET: -712 mL    PHYSICAL EXAM  Appearance: Normal, NAD  HEAD:  Normocephalic  EYES:  PERRLA, conjunctiva and sclera clear  NECK: Supple, No JVD  CHEST/LUNG: Clear to auscultation bilaterally; No wheezing  HEART: Normal S1, S2. No murmurs, rubs, or gallops  ABDOMEN: + Bowel sounds, Soft, NT, ND   EXTREMITIES:  2+ Peripheral Pulses, No clubbing, cyanosis, or edema  NEUROLOGY: non-focal, aaox3  SKIN: No rashes or lesions    Interpretation of Telemetry:                        11.9   9.78  )-----------( 126                37.2       135  |  105  |  29<H>  --------------------------<  170<H>  4.2   |  21<L>  |  1.17    Ca    9.7     Phos  2.9     Mg     1.9 (repleted)   TPro  6.7  /  Alb  3.8  /  TBili  0.3  /  DBili  x   /  AST  41<H>  /  ALT  127<H>  /  AlkPhos  60   F/S 116-232  PTT 60.1

## 2023-12-10 NOTE — PROGRESS NOTE ADULT - SUBJECTIVE AND OBJECTIVE BOX
LD VALENCIA  58y Male  MRN:[id number]    Patient is a 58y old  Male who presents with a chief complaint of NSTEMI (01 Nov 2023 20:29)    HPI:  59yo M w/ hx HTN, CAD w/ 1 stent in 2009, ICH (2008) presenting with abn ekg. Patient presented to [facility] where he was found to have STEMI, recommended to get cath however patient did not want to get it there so it left and came here.  Patient initially had cough, congestion, fever, was placed on antibiotics on Sunday.  Started feeling nauseous and had a presyncopal event after which he presented to ED last night.  Had chest pain as well.  Chest pain is midsternal.  Not currently having chest pain.  Received 4 aspirin 30 min pta. (01 Nov 2023 15:11)      Patient seen and evaluated at bedside in CCU. interval events noted    Interval HPI: remain in ccu. IABP in place        PAST MEDICAL & SURGICAL HISTORY:  HTN (hypertension)      CAD (coronary artery disease)  2009; stent      Intracranial hemorrhage  2008      Respiratory arrest  december 1st      Myocardial infarction, unspecified MI type, unspecified artery      History of coronary artery stent placement          REVIEW OF SYSTEMS:  as per hpi     VITALS:   ICU Vital Signs Last 24 Hrs  T(C): 36.7 (10 Dec 2023 12:00), Max: 36.7 (09 Dec 2023 17:00)  T(F): 98 (10 Dec 2023 12:00), Max: 98.1 (09 Dec 2023 23:00)  HR: 85 (10 Dec 2023 15:00) (69 - 88)  BP: --  BP(mean): --  ABP: --  ABP(mean): --  RR: 22 (10 Dec 2023 15:00) (14 - 27)  SpO2: 99% (10 Dec 2023 15:00) (96% - 100%)    O2 Parameters below as of 10 Dec 2023 15:00  Patient On (Oxygen Delivery Method): room air                PHYSICAL EXAM:  GENERAL: NAD, comfortable   HEAD:  Atraumatic, Normocephalic  EYES: EOMI, PERRLA, conjunctiva and sclera clear  NECK: Supple, No JVD   CHEST/LUNG: Clear to auscultation bilaterally; No wheeze  HEART: Regular rate and rhythm; No murmurs, rubs, or gallops  ABDOMEN: Soft, Nontender, Nondistended; Bowel sounds present  EXTREMITIES:  2+ Peripheral Pulses, No clubbing, cyanosis, or edema  NEUROLOGY: nonfocal  SKIN: +rash  +iabp    Consultant(s) Notes Reviewed:  [x ] YES  [ ] NO  Care Discussed with Consultants/Other Providers [ x] YES  [ ] NO    MEDS:   MEDICATIONS  (STANDING):  aspirin  chewable 81 milliGRAM(s) Oral daily  atorvastatin 80 milliGRAM(s) Oral at bedtime  budesonide  80 MICROgram(s)/formoterol 4.5 MICROgram(s) Inhaler 2 Puff(s) Inhalation two times a day  carvedilol 25 milliGRAM(s) Oral every 12 hours  chlorhexidine 2% Cloths 1 Application(s) Topical daily  clobetasol 0.05% Ointment 1 Application(s) Topical two times a day  heparin  Infusion 1300 Unit(s)/Hr (14 mL/Hr) IV Continuous <Continuous>  hydrALAZINE 75 milliGRAM(s) Oral three times a day  insulin glargine Injectable (LANTUS) 12 Unit(s) SubCutaneous at bedtime  insulin lispro (ADMELOG) corrective regimen sliding scale   SubCutaneous at bedtime  insulin lispro (ADMELOG) corrective regimen sliding scale   SubCutaneous three times a day before meals  insulin lispro Injectable (ADMELOG) 9 Unit(s) SubCutaneous three times a day before meals  isosorbide   dinitrate Tablet (ISORDIL) 30 milliGRAM(s) Oral three times a day  polyethylene glycol 3350 17 Gram(s) Oral two times a day  senna 2 Tablet(s) Oral at bedtime    MEDICATIONS  (PRN):  hydrOXYzine hydrochloride 25 milliGRAM(s) Oral two times a day PRN Anxiety      ALLERGIES:  penicillins (Unknown)      LABS:                                                            11.9   9.78  )-----------( 126      ( 10 Dec 2023 00:29 )             37.2   12-10    135  |  105  |  29<H>  ----------------------------<  170<H>  4.2   |  21<L>  |  1.17    Ca    9.7      10 Dec 2023 00:29  Phos  2.9     12-10  Mg     1.9     12-10    TPro  6.7  /  Alb  3.8  /  TBili  0.3  /  DBili  x   /  AST  41<H>  /  ALT  127<H>  /  AlkPhos  60  12-10      < from: CT Abdomen and Pelvis No Cont (11.28.23 @ 03:38) >  IMPRESSION:  Mildly dilated colon and prominent but not overly dilated small bowel   with air-fluid levels. No discrete transition point. Findings are   suggestive of ileus.    Intra-aortic balloon pump with the inferior marker at the level of the   inferior mesenteric artery and the balloon overlying the origins of the   renal arteries, celiac artery, and superior mesenteric artery. Consider   repositioning. Concern for IABP positioning was discussed with LANETTE Hawthorne   on 11/28/2023 at 3:42 AM by Dr. Shepard.    < end of copied text >          < from: Colonoscopy (11.17.23 @ 10:35) >                                                                                                        Impression:          - The entire examined colon is normal on direct and retroflexion views.                       - No specimens collected.  Recommendation:      - Resume previous diet today.                       - No large polyps or masses detected, No objection from GI standpoint to                 proceed with heart transplantation/advanced heart therapies.                       - Please call back should any further questions or concerns arise.    < end of copied text >     < from: Xray Chest 1 View- PORTABLE-Urgent (11.01.23 @ 07:42) >    IMPRESSION:  Clear lungs.    ---End of Report ---        < end of copied text >  < from: TTE W or WO Ultrasound Enhancing Agent (11.01.23 @ 10:23) >  _____________________________     CONCLUSIONS:      1. Left ventricular cavity is moderately dilated. Left ventricular wall thickness is normal. Left ventricular systolic function is severely decreased with an ejection fraction of 32 % by Chinchilla's method of disks. Regional wall motion abnormalities present.   2. Multiple segmental abnormalities exist. See findings.   3. There is moderate (grade 2) left ventricular diastolic dysfunction, with indeterminant filling pressure.   4. Normal right ventricular cavity size, wall thickness, and systolic function.   5. No significant valvular disease.   6. No pericardial effusion seen.   7. Compared to the transthoracic echocardiogram performed on 1/25/2017 the areas of akinesis are unchanged but there has been a decline in LV systolic function with new areas of hypokinesis.    __________________________________________________________________    < end of copied text >  < from: TTE Limited W or WO Ultrasound Enhancing Agent (11.02.23 @ 07:41) >  __________________________     CONCLUSIONS:      1. After obtaining consent, Definity ultrasound enhancing agent was given for enhanced left ventricular opacification and improved delineation of the left ventricular endocardial borders. Left ventricular systolic function is severely decreased with a calculated ejection fraction of 22 % by the Chinchilla's biplane method of disks. There is a left ventricular thrombus.   2. Findings were discussed with Litzy BOSS on 11/2/2023 at 8.49am.   3. There is a left ventricular thrombus.    _________________________________________________________________    < end of copied text >   LD VALENCIA  58y Male  MRN:[id number]    Patient is a 58y old  Male who presents with a chief complaint of NSTEMI (01 Nov 2023 20:29)    HPI:  57yo M w/ hx HTN, CAD w/ 1 stent in 2009, ICH (2008) presenting with abn ekg. Patient presented to [facility] where he was found to have STEMI, recommended to get cath however patient did not want to get it there so it left and came here.  Patient initially had cough, congestion, fever, was placed on antibiotics on Sunday.  Started feeling nauseous and had a presyncopal event after which he presented to ED last night.  Had chest pain as well.  Chest pain is midsternal.  Not currently having chest pain.  Received 4 aspirin 30 min pta. (01 Nov 2023 15:11)      Patient seen and evaluated at bedside in CCU. interval events noted    Interval HPI: remain in ccu. IABP in place        PAST MEDICAL & SURGICAL HISTORY:  HTN (hypertension)      CAD (coronary artery disease)  2009; stent      Intracranial hemorrhage  2008      Respiratory arrest  december 1st      Myocardial infarction, unspecified MI type, unspecified artery      History of coronary artery stent placement          REVIEW OF SYSTEMS:  as per hpi     VITALS:   ICU Vital Signs Last 24 Hrs  T(C): 36.7 (10 Dec 2023 12:00), Max: 36.7 (09 Dec 2023 17:00)  T(F): 98 (10 Dec 2023 12:00), Max: 98.1 (09 Dec 2023 23:00)  HR: 85 (10 Dec 2023 15:00) (69 - 88)  BP: --  BP(mean): --  ABP: --  ABP(mean): --  RR: 22 (10 Dec 2023 15:00) (14 - 27)  SpO2: 99% (10 Dec 2023 15:00) (96% - 100%)    O2 Parameters below as of 10 Dec 2023 15:00  Patient On (Oxygen Delivery Method): room air                PHYSICAL EXAM:  GENERAL: NAD, comfortable   HEAD:  Atraumatic, Normocephalic  EYES: EOMI, PERRLA, conjunctiva and sclera clear  NECK: Supple, No JVD   CHEST/LUNG: Clear to auscultation bilaterally; No wheeze  HEART: Regular rate and rhythm; No murmurs, rubs, or gallops  ABDOMEN: Soft, Nontender, Nondistended; Bowel sounds present  EXTREMITIES:  2+ Peripheral Pulses, No clubbing, cyanosis, or edema  NEUROLOGY: nonfocal  SKIN: +rash  +iabp    Consultant(s) Notes Reviewed:  [x ] YES  [ ] NO  Care Discussed with Consultants/Other Providers [ x] YES  [ ] NO    MEDS:   MEDICATIONS  (STANDING):  aspirin  chewable 81 milliGRAM(s) Oral daily  atorvastatin 80 milliGRAM(s) Oral at bedtime  budesonide  80 MICROgram(s)/formoterol 4.5 MICROgram(s) Inhaler 2 Puff(s) Inhalation two times a day  carvedilol 25 milliGRAM(s) Oral every 12 hours  chlorhexidine 2% Cloths 1 Application(s) Topical daily  clobetasol 0.05% Ointment 1 Application(s) Topical two times a day  heparin  Infusion 1300 Unit(s)/Hr (14 mL/Hr) IV Continuous <Continuous>  hydrALAZINE 75 milliGRAM(s) Oral three times a day  insulin glargine Injectable (LANTUS) 12 Unit(s) SubCutaneous at bedtime  insulin lispro (ADMELOG) corrective regimen sliding scale   SubCutaneous at bedtime  insulin lispro (ADMELOG) corrective regimen sliding scale   SubCutaneous three times a day before meals  insulin lispro Injectable (ADMELOG) 9 Unit(s) SubCutaneous three times a day before meals  isosorbide   dinitrate Tablet (ISORDIL) 30 milliGRAM(s) Oral three times a day  polyethylene glycol 3350 17 Gram(s) Oral two times a day  senna 2 Tablet(s) Oral at bedtime    MEDICATIONS  (PRN):  hydrOXYzine hydrochloride 25 milliGRAM(s) Oral two times a day PRN Anxiety      ALLERGIES:  penicillins (Unknown)      LABS:                                                            11.9   9.78  )-----------( 126      ( 10 Dec 2023 00:29 )             37.2   12-10    135  |  105  |  29<H>  ----------------------------<  170<H>  4.2   |  21<L>  |  1.17    Ca    9.7      10 Dec 2023 00:29  Phos  2.9     12-10  Mg     1.9     12-10    TPro  6.7  /  Alb  3.8  /  TBili  0.3  /  DBili  x   /  AST  41<H>  /  ALT  127<H>  /  AlkPhos  60  12-10      < from: CT Abdomen and Pelvis No Cont (11.28.23 @ 03:38) >  IMPRESSION:  Mildly dilated colon and prominent but not overly dilated small bowel   with air-fluid levels. No discrete transition point. Findings are   suggestive of ileus.    Intra-aortic balloon pump with the inferior marker at the level of the   inferior mesenteric artery and the balloon overlying the origins of the   renal arteries, celiac artery, and superior mesenteric artery. Consider   repositioning. Concern for IABP positioning was discussed with LANETTE Hawthorne   on 11/28/2023 at 3:42 AM by Dr. Shepard.    < end of copied text >          < from: Colonoscopy (11.17.23 @ 10:35) >                                                                                                        Impression:          - The entire examined colon is normal on direct and retroflexion views.                       - No specimens collected.  Recommendation:      - Resume previous diet today.                       - No large polyps or masses detected, No objection from GI standpoint to                 proceed with heart transplantation/advanced heart therapies.                       - Please call back should any further questions or concerns arise.    < end of copied text >     < from: Xray Chest 1 View- PORTABLE-Urgent (11.01.23 @ 07:42) >    IMPRESSION:  Clear lungs.    ---End of Report ---        < end of copied text >  < from: TTE W or WO Ultrasound Enhancing Agent (11.01.23 @ 10:23) >  _____________________________     CONCLUSIONS:      1. Left ventricular cavity is moderately dilated. Left ventricular wall thickness is normal. Left ventricular systolic function is severely decreased with an ejection fraction of 32 % by Chinchilla's method of disks. Regional wall motion abnormalities present.   2. Multiple segmental abnormalities exist. See findings.   3. There is moderate (grade 2) left ventricular diastolic dysfunction, with indeterminant filling pressure.   4. Normal right ventricular cavity size, wall thickness, and systolic function.   5. No significant valvular disease.   6. No pericardial effusion seen.   7. Compared to the transthoracic echocardiogram performed on 1/25/2017 the areas of akinesis are unchanged but there has been a decline in LV systolic function with new areas of hypokinesis.    __________________________________________________________________    < end of copied text >  < from: TTE Limited W or WO Ultrasound Enhancing Agent (11.02.23 @ 07:41) >  __________________________     CONCLUSIONS:      1. After obtaining consent, Definity ultrasound enhancing agent was given for enhanced left ventricular opacification and improved delineation of the left ventricular endocardial borders. Left ventricular systolic function is severely decreased with a calculated ejection fraction of 22 % by the Chinchilla's biplane method of disks. There is a left ventricular thrombus.   2. Findings were discussed with Litzy BOSS on 11/2/2023 at 8.49am.   3. There is a left ventricular thrombus.    _________________________________________________________________    < end of copied text >

## 2023-12-10 NOTE — PROGRESS NOTE ADULT - ASSESSMENT
====================ASSESSMENT ==============  58 male with HTN, CAD (s/p PCI 2008), HFrEF, CVA 2018, and T2DM presenting with chest pressure and unknown tachycardia that was shocked x1, [facility] 11/1 found to have in-stent restenosis of pLAD and  of RCA with elevated RA and PA pressures and severely decreased. Admitted to CICU for management of cardiogenic shock and ADHF requiring IABP 11/1 -11/7, with hospital course c/b vfib arrest requiring reinsertion of IABP. Not recommended for ATs per HF. Currently listed for transplant status 2.    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 AT evaluation launched 11/10, he is ABO A. He was discussed during TSC on 11/16 and was approved for listing, however was found to be Bacteremic with 11/17 Blc Cx growing mixed cultures Staph epi and Enterococcus faecalis and started on IV Vanco. Repeat cultures from 11/18 reveal NGTD and therefore was cleared by ID for listing.     He appears adequately supported on IABP at 1:1, electrically quiescent on oral Amiodarone. Cr is improving with holding diuretics. Labs otherwise notable for worsening thrombocytopenia. Awaiting suitable donor. Now with GM + rods in blood culture on 11/30 (reported on 12/4), restarted on vancomycin, and status 7, repeat cultures now negative x48 hours (12/6),  now status 2 again.       ====================== NEUROLOGY=====================  Anxiety  - No Seroquel/antipsychotics since vfib arrest and prolonged QTC  - Psych Eval, recommended SSRI, but pt. refused   - Psych recommending atarax PRN  - Continue to monitor mental status    PT/Conditioning  - Continue band exercised while on bedrest s/t IABP    ==================== RESPIRATORY======================  Acute Hypoxemic Respiratory Failure  - s/p x2 intubations for cardiogenic pulm edema and the in setting of cardiac arrest, resolved - extubated 11/10  - Currently maintaining >95% sats on room air  - Continue incentive spirometry and monitoring of sp02    Asthma  - c/w albuterol, symbicort and spiriva  - On trelegy at home  - Continue to monitor SpO2 with goal >94%    ====================CARDIOVASCULAR==================  Vfib arrest i/s/o ischemia  - Lido gtt off 11/13  - PO Amio load - total of 5g per EP complete 11/17, continue PO amio  - Amio held for rising LFTs, continue to hold  - Keep K > 4, Mag > 2.2     Cardiogenic shock requiring IABP (11/1- 11/7, 11/9-)  - Likely 2/2 NSTEMI and ADHF  - 11/1 LHC: pLAD 100 % in-stent restenosis & mRCA, 100 %. PCWP 30. IABP placed.  - 11/1 TTE: LV dilated. EF 32 %. Regional WMAs present, mod (grade 2) LV diastolic dysfunction  - 11/2 TTE: EF 22% and + LV thrombus  - IABP swapped 11/20 to RFA, continue 1:1 support  - Off Milrinone gtt @ 7:30 am 11/11  - James d/c'd due to elevated K levels  - c/w coreg 25 BID for GDMT  - HIT and HAIDER sent (12/3) as per HF   - UNOS status 2 as of 12/6  - 12/5 - reduced hydralazine 100 TID to 75 TID and ISD 40 TID to 30 TID for AL reduction    NSTEMI iso stent re-occlusion of pLAD and 100%  of RCA  - EKG on admission w/ LBBB  - DAPT: c/w ASA, Brilinta d/c'd per transplant w/u  - c/w lipitor 80  - cMR deferred given necessity of IABP  - CT sx not recommending CABG, undergoing AT eval    LV thrombus  - c/w heparin gtt w/ therapeutic PTT    ===================== RENAL =========================  Non-oliguric ARIC, resolved   - Baseline Cr: 1-1.22  - Renal US: no evidence of renal artery stenosis  - Trend BMP, lytes daily, replace as needed  - Continue Strict I/Os, avoid nephrotoxins    Mild Hyponatremia/Hyperkalemia iso ARIC  - Continue fluid restriction   - Urea powder d/c'd per renal  - Trend daily  - Continue monitoring urine output, lytes, SCr/ BUN  - Replete lytes prn with goal K >4 and Mg >2    =============== GASTROINTESTINAL===================  Constipation/ileus, resolved  - regular diet  - bowel reg prn    ===================ENDO====================  Type 2 DM  - A1c 8.3   - Continue lantus, premeal, low ISS  - continue FS    ===================HEMATOLOGIC/ONC ===================  - H/H & plts stable  - Monitor H/H and plts  - VTE PPX: heparin gtt    ==================INFECTIOUS DISEASE================  # Positive blood culture, resolved  - Repeat BCx drawn 11/30 for leukocytosis to 12k - positive on 12/4, G+ rods in anaerobic bottle, suspect contaminant  - Repeat cultures x2 sent 12/4 per transplant ID recs - no growth to date  - Vancomycin by trough (12/4-12/6)  - Final microbial ID: Cutibacterium acnes, per ID this is likely to be a skin contaminant, vanc dc'd.   - Dental eval 12/7 to rule out infectious etiology that would have led to bacteremia, no significant findings on exam.     # Enterococcus faecalis bacteremia, resolved  - BCx 11/17+ for enterococcus faecalis x2, Staph epi x1 (likely contaminant)- pan sensitive   - Urine cx 11/18 + enterococcus faecalis  - BCx 11/18 no growth   - IABP site swapped to RFA 11/20  - s/p Vancomycin 1g q12h (11/18-11/27)  - CT A/P negative for infectious pathology    # COVID, resolved  - Off airborne precautions 11/11    # Pre-transplant ID w/u   - Trend ID recs for serologies   - Colonoscopy 11/17 - normal   - Chest CT 11/17 - improved LLL aeration  - s/p immunizations    ==================INFECTIOUS DISEASE================  # Upper Extremity Rash  - Patient developed bilateral upper extremity rash after receiving Hepatitis A & B immunizations on 11/24  - Dermatology consulted 12/5 - not likely to be related to vanco, can continue per ID recs  - Recommend clobetasol 0.05% BID to affected areas   - RVP repeated to rule out viral etiology, negative   ====================ASSESSMENT ==============  58 male with HTN, CAD (s/p PCI 2008), HFrEF, CVA 2018, and T2DM presenting with chest pressure and unknown tachycardia that was shocked x1, [facility] 11/1 found to have in-stent restenosis of pLAD and  of RCA with elevated RA and PA pressures and severely decreased. Admitted to CICU for management of cardiogenic shock and ADHF requiring IABP 11/1 -11/7, with hospital course c/b vfib arrest requiring reinsertion of IABP. Not recommended for ATs per HF. Currently listed for transplant status 2.    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 AT evaluation launched 11/10, he is ABO A. He was discussed during TSC on 11/16 and was approved for listing, however was found to be Bacteremic with 11/17 Blc Cx growing mixed cultures Staph epi and Enterococcus faecalis and started on IV Vanco. Repeat cultures from 11/18 reveal NGTD and therefore was cleared by ID for listing.     He appears adequately supported on IABP at 1:1, electrically quiescent on oral Amiodarone. Cr is improving with holding diuretics. Labs otherwise notable for worsening thrombocytopenia. Awaiting suitable donor. Now with GM + rods in blood culture on 11/30 (reported on 12/4), restarted on vancomycin, and status 7, repeat cultures now negative x48 hours (12/6),  now status 2 again.       ====================== NEUROLOGY=====================  Anxiety  - No Seroquel/antipsychotics since vfib arrest and prolonged QTC  - Psych Eval, recommended SSRI, but pt. refused   - Psych recommending atarax PRN  - Continue to monitor mental status    PT/Conditioning  - Continue band exercised while on bedrest s/t IABP    ==================== RESPIRATORY======================  Acute Hypoxemic Respiratory Failure  - s/p x2 intubations for cardiogenic pulm edema and the in setting of cardiac arrest, resolved - extubated 11/10  - Currently maintaining >95% sats on room air  - Continue incentive spirometry and monitoring of sp02    Asthma  - c/w albuterol, symbicort and spiriva  - On trelegy at home  - Continue to monitor SpO2 with goal >94%    ====================CARDIOVASCULAR==================  Vfib arrest i/s/o ischemia  - Lido gtt off 11/13  - PO Amio load - total of 5g per EP complete 11/17, continue PO amio  - Amio held for rising LFTs, continue to hold  - Keep K > 4, Mag > 2.2     Cardiogenic shock requiring IABP (11/1- 11/7, 11/9-)  - Likely 2/2 NSTEMI and ADHF  - 11/1 LHC: pLAD 100 % in-stent restenosis & mRCA, 100 %. PCWP 30. IABP placed.  - 11/1 TTE: LV dilated. EF 32 %. Regional WMAs present, mod (grade 2) LV diastolic dysfunction  - 11/2 TTE: EF 22% and + LV thrombus  - IABP swapped 11/20 to RFA, continue 1:1 support  - Off Milrinone gtt @ 7:30 am 11/11  - James d/c'd due to elevated K levels  - c/w coreg 25 BID for GDMT  - HIT and HAIDER sent (12/3) as per HF   - UNOS status 2 as of 12/6  - 12/5 - reduced hydralazine 100 TID to 75 TID and ISD 40 TID to 30 TID for AL reduction    NSTEMI iso stent re-occlusion of pLAD and 100%  of RCA  - EKG on admission w/ LBBB  - DAPT: c/w ASA, Brilinta d/c'd per transplant w/u  - c/w lipitor 80  - cMR deferred given necessity of IABP  - CT sx not recommending CABG, undergoing AT eval    LV thrombus  - c/w heparin gtt w/ therapeutic PTT    ===================== RENAL =========================  Non-oliguric ARIC, resolved   - Baseline Cr: 1-1.22  - Renal US: no evidence of renal artery stenosis  - Trend BMP, lytes daily, replace as needed  - Continue Strict I/Os, avoid nephrotoxins    Mild Hyponatremia/Hyperkalemia iso ARIC  - Continue fluid restriction   - Urea powder d/c'd per renal  - Trend daily  - Continue monitoring urine output, lytes, SCr/ BUN  - Replete lytes prn with goal K >4 and Mg >2    =============== GASTROINTESTINAL===================  Constipation/ileus, resolved  - regular diet  - bowel reg prn    ===================ENDO====================  Type 2 DM  - A1c 8.3   - Continue lantus, premeal, low ISS  - continue FS    ===================HEMATOLOGIC/ONC ===================  - H/H & plts stable  - Monitor H/H and plts  - VTE PPX: heparin gtt    ==================INFECTIOUS DISEASE================  # Positive blood culture, resolved  - Repeat BCx drawn 11/30 for leukocytosis to 12k - positive on 12/4, G+ rods in anaerobic bottle, suspect contaminant  - Repeat cultures x2 sent 12/4 per transplant ID recs - no growth to date  - Vancomycin by trough (12/4-12/6)  - Final microbial ID: Cutibacterium acnes, per ID this is likely to be a skin contaminant, vanc dc'd.   - Dental eval 12/7 to rule out infectious etiology that would have led to bacteremia, no significant findings on exam.     # Enterococcus faecalis bacteremia, resolved  - BCx 11/17+ for enterococcus faecalis x2, Staph epi x1 (likely contaminant)- pan sensitive   - Urine cx 11/18 + enterococcus faecalis  - BCx 11/18 no growth   - IABP site swapped to RFA 11/20  - s/p Vancomycin 1g q12h (11/18-11/27)  - CT A/P negative for infectious pathology    # COVID, resolved  - Off airborne precautions 11/11    # Pre-transplant ID w/u   - Trend ID recs for serologies   - Colonoscopy 11/17 - normal   - Chest CT 11/17 - improved LLL aeration  - s/p immunizations    ==================INFECTIOUS DISEASE================  # Upper Extremity Rash  - Patient developed bilateral upper extremity rash after receiving Hepatitis A & B immunizations on 11/24  - Dermatology consulted 12/5 - not likely to be related to vanco, can continue per ID recs  - Recommend clobetasol 0.05% BID to affected areas   - RVP repeated to rule out viral etiology, negative

## 2023-12-10 NOTE — PROGRESS NOTE ADULT - CRITICAL CARE ATTENDING COMMENT
59yo gentleman CAD, HTN, NSTEMI, with pul edema admitted in cardiogenic shock now s/p extubation, VF episode resolved, with IABP awaiting transplant. Possible heart overnight but declined today secondary to ischemic time in view of weather. Neg 700 with autodiuresis. WBC 9, plt 126, cr 1.1 stable Na 135 stable. PTT 60.1 today. Currently on atorvastatin, hydral, iso, aspirin, coreg, lantus and sliding scale. CXR no change In IABP position. Status 2. Completed antibiotic course and no fever. 57yo gentleman CAD, HTN, NSTEMI, with pul edema admitted in cardiogenic shock now s/p extubation, VF episode resolved, with IABP awaiting transplant. Possible heart overnight but declined today secondary to ischemic time in view of weather. Neg 700 with autodiuresis. WBC 9, plt 126, cr 1.1 stable Na 135 stable. PTT 60.1 today. Currently on atorvastatin, hydral, iso, aspirin, coreg, lantus and sliding scale. CXR no change In IABP position. Status 2. Completed antibiotic course and no fever.

## 2023-12-10 NOTE — PROGRESS NOTE ADULT - SUBJECTIVE AND OBJECTIVE BOX
DEVORAH VALENCIA  MRN-[id number]  Patient is a 58y old  Male who presents with a chief complaint of Cardiogenic shock (10 Dec 2023 06:45)    HPI:  pt seen and approx 1:30 pm  in [facility]    59yo M w/ hx HTN, CAD w/ 1 stent in , ICH () presenting with abn ekg. Patient presented to [facility] where he was found to have STEMI, recommended to get cath however patient did not want to get it there so it left and came here.  Patient initially had cough, congestion, fever, was placed on antibiotics on .  Started feeling nauseous and had a presyncopal event after which he presented to ED last night.  Had chest pain as well.  Chest pain is midsternal.  Not currently having chest pain.  Received 4 aspirin 30 min pta. (2023 15:11)      Hospital Course:    24 HOUR EVENTS:    REVIEW OF SYSTEMS:    CONSTITUTIONAL: No weakness, fevers or chills  EYES/ENT: No visual changes;  No vertigo or throat pain   NECK: No pain or stiffness  RESPIRATORY: No cough, wheezing, hemoptysis; No shortness of breath  CARDIOVASCULAR: No chest pain or palpitations  GASTROINTESTINAL: No abdominal or epigastric pain. No nausea, vomiting, or hematemesis; No diarrhea or constipation. No melena or hematochezia.  GENITOURINARY: No dysuria, frequency or hematuria  NEUROLOGICAL: No numbness or weakness  SKIN: No itching, rashes      ICU Vital Signs Last 24 Hrs  T(C): 37.1 (10 Dec 2023 19:00), Max: 37.1 (10 Dec 2023 16:00)  T(F): 98.7 (10 Dec 2023 19:00), Max: 98.7 (10 Dec 2023 16:00)  HR: 82 (10 Dec 2023 19:00) (69 - 93)  BP: --  BP(mean): --  ABP: --  ABP(mean): --  RR: 18 (10 Dec 2023 19:00) (14 - 27)  SpO2: 97% (10 Dec 2023 19:00) (97% - 100%)    O2 Parameters below as of 10 Dec 2023 19:00  Patient On (Oxygen Delivery Method): room air            CVP(mm Hg): --  CO: --  CI: --  PA: --  PA(mean): --  PA(direct): --  PCWP: --  LA: --  RA: --  SVR: --  SVRI: --  PVR: --  PVRI: --  I&O's Summary    09 Dec 2023 07:01  -  10 Dec 2023 07:00  --------------------------------------------------------  IN: 876 mL / OUT: 1560 mL / NET: -684 mL    10 Dec 2023 07:01  -  10 Dec 2023 20:21  --------------------------------------------------------  IN: 1074 mL / OUT: 850 mL / NET: 224 mL        CAPILLARY BLOOD GLUCOSE    CAPILLARY BLOOD GLUCOSE      POCT Blood Glucose.: 206 mg/dL (10 Dec 2023 16:50)      PHYSICAL EXAM:  Appearance: Normal, NAD  HEAD:  Normocephalic  EYES:  PERRLA, conjunctiva and sclera clear  NECK: Supple, No JVD  CHEST/LUNG: Clear to auscultation bilaterally; No wheezing  HEART: Normal S1, S2. No murmurs, rubs, or gallops  ABDOMEN: + Bowel sounds, Soft, NT, ND   EXTREMITIES:  2+ Peripheral Pulses, No clubbing, cyanosis, or edema  NEUROLOGY: non-focal, aaox3  SKIN: No rashes or lesions    ============================I/O===========================   I&O's Detail    09 Dec 2023 07:  -  10 Dec 2023 07:00  --------------------------------------------------------  IN:    Heparin: 336 mL    Oral Fluid: 540 mL  Total IN: 876 mL    OUT:    Voided (mL): 1560 mL  Total OUT: 1560 mL    Total NET: -684 mL      10 Dec 2023 07:01  -  10 Dec 2023 20:21  --------------------------------------------------------  IN:    Heparin: 154 mL    Oral Fluid: 920 mL  Total IN: 1074 mL    OUT:    Voided (mL): 850 mL  Total OUT: 850 mL    Total NET: 224 mL        ============================ LABS =========================                        11.3   9.46  )-----------( 135      ( 10 Dec 2023 17:45 )             34.8     12-10    136  |  106  |  32<H>  ----------------------------<  260<H>  3.7   |  17<L>  |  1.06    Ca    9.4      10 Dec 2023 17:45  Phos  2.4     12-10  Mg     1.8     12-10    TPro  6.4  /  Alb  3.6  /  TBili  0.2  /  DBili  x   /  AST  46<H>  /  ALT  136<H>  /  AlkPhos  66  10                LIVER FUNCTIONS - ( 10 Dec 2023 17:45 )  Alb: 3.6 g/dL / Pro: 6.4 g/dL / ALK PHOS: 66 U/L / ALT: 136 U/L / AST: 46 U/L / GGT: x           PT/INR - ( 10 Dec 2023 00:29 )   PT: 11.4 sec;   INR: 1.04 ratio         PTT - ( 10 Dec 2023 00:29 )  PTT:60.1 sec    Lactate, Blood: 1.9 mmol/L (12-10-23 @ 17:45)  Lactate, Blood: 1.3 mmol/L (23 @ 00:59)  Lactate, Blood: 1.5 mmol/L (23 @ 01:22)    Urinalysis Basic - ( 10 Dec 2023 17:45 )    Color: x / Appearance: x / SG: x / pH: x  Gluc: 260 mg/dL / Ketone: x  / Bili: x / Urobili: x   Blood: x / Protein: x / Nitrite: x   Leuk Esterase: x / RBC: x / WBC x   Sq Epi: x / Non Sq Epi: x / Bacteria: x      ======================Micro/Rad/Cardio=================  Telemtry: Reviewed   EKG: Reviewed  CXR: Reviewed  Culture: Reviewed   Echo:   Cath: Cardiac Cath Lab - Adult:   [facility]  Department of Cardiology  [address] [place] [address]  [phone]  Cath Lab Report -- Comprehensive Report  Patient: LD VALENCIA  Study date: 2017  Account number: [id number]  MR number: [id number]  : 1965  Gender: Male  Race: W  Case Physician(s):  Jairon Holguin M.D.  Referring Physician:  Luc Lynn M.D.  INDICATIONS: Unstable angina - CCS4.  HISTORY: The patient has a history of coronary artery disease. The patient  hashypertension and medication-treated dyslipidemia.  PROCEDURE:  --  Left heart catheterization with ventriculography.  --  Left coronary angiography.  --  Right coronary angiography.  TECHNIQUE: The risks and alternatives of the procedures and conscious  sedation were explained to the patient and informed consent was obtained.  Cardiac catheterization performed electively.  Local anesthetic given. Right radial artery access. A 6FR PRELUDE KIT was  inserted in the vessel. Left heart catheterization. Ventriculography was  performed. A 5FR FR4.0 EXPO catheter was utilized. Left coronary artery  angiography. The vessel was injected utilizing a 5FR FL3.5 EXPO catheter.  Right coronary artery angiography. The vessel was injected utilizing a 5FR  FR4.0 EXPO catheter. RADIATION EXPOSURE: 1.1 min.  CONTRAST GIVEN: Omnipaque 55 ml.  MEDICATIONS GIVEN: Midazolam, 1 mg, IV. Fentanyl, 25 mcg, IV. Verapamil  (Isoptin, Calan, Covera), 2.5 mg, IA. Heparin, 3000 units, IA.  VENTRICLES: Global left ventricular function was moderately depressed. EF  estimated was 40 %.  CORONARY VESSELS: The coronary circulation is right dominant.  LM:   --  LM: Normal.  LAD:   --  Proximal LAD: There was a 50 % stenosis.  CX:   --  Circumflex: Normal.  RCA:   --  Mid RCA: There was a 40 % stenosis.  --  Distal RCA: There was a 50 % stenosis.  COMPLICATIONS: There were no complications.  DIAGNOSTIC RECOMMENDATIONS: The patient should continue with the present  medications.  Prepared and signed by  Jairon Holguin M.D.  Signed 2017 12:20:13  HEMODYNAMIC TABLES  Pressures:  Baseline/ Room Air  Pressures:  - HR: 78  Pressures:  - Rhythm:  Pressures:  -- Aortic Pressure (S/D/M): --/--/99  Pressures:  -- Left Ventricle (s/edp): 157/39/--  Outputs:  Baseline/ Room Air  Outputs:  -- CALCULATIONS[age]  Outputs:  -- CALCULATIONS: Body Surface Area: 2.05  Outputs:  -- CALCULATIONS: Height in cm: 175.00  Outputs:  -- CALCULATIONS: Sex: Male  Outputs:  -- CALCULATIONS: Weight in k.40 (17 @ 21:55)    ======================================================  PAST MEDICAL & SURGICAL HISTORY:  HTN (hypertension)      CAD (coronary artery disease)  ; stent      Intracranial hemorrhage        Respiratory arrest        Myocardial infarction, unspecified MI type, unspecified artery      History of coronary artery stent placement      ====================ASSESSMENT ==============  58 male with HTN, CAD (s/p PCI ), HFrEF, CVA , and T2DM presenting with chest pressure and unknown tachycardia that was shocked x1, [facility]  found to have in-stent restenosis of pLAD and  of RCA with elevated RA and PA pressures and severely decreased. Admitted to CICU for management of cardiogenic shock and ADHF requiring IABP  -, with hospital course c/b vfib arrest requiring reinsertion of IABP. Not recommended for ATs per HF. Currently listed for transplant status 2.    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 AT evaluation launched 11/10, he is ABO A. He was discussed during TSC on  and was approved for listing, however was found to be Bacteremic with  Blc Cx growing mixed cultures Staph epi and Enterococcus faecalis and started on IV Vanco. Repeat cultures from  reveal NGTD and therefore was cleared by ID for listing.     He appears adequately supported on IABP at 1:1, electrically quiescent on oral Amiodarone. Cr is improving with holding diuretics. Labs otherwise notable for worsening thrombocytopenia. Awaiting suitable donor. Now with GM + rods in blood culture on  (reported on ), restarted on vancomycin, and status 7, repeat cultures now negative x48 hours (),  now status 2 again.       ====================== NEUROLOGY=====================  Anxiety  - No Seroquel/antipsychotics since vfib arrest and prolonged QTC  - Psych Eval, recommended SSRI, but pt. refused   - Psych recommending atarax PRN  - Continue to monitor mental status    PT/Conditioning  - Continue band exercised while on bedrest s/t IABP    ==================== RESPIRATORY======================  Acute Hypoxemic Respiratory Failure  - s/p x2 intubations for cardiogenic pulm edema and the in setting of cardiac arrest, resolved - extubated 11/10  - Currently maintaining >95% sats on room air  - Continue incentive spirometry and monitoring of sp02    Asthma  - c/w albuterol, symbicort and spiriva  - On trelegy at home  - Continue to monitor SpO2 with goal >94%    ====================CARDIOVASCULAR==================  Vfib arrest i/s/o ischemia  - Lido gtt off   - PO Amio load - total of 5g per EP complete , continue PO amio  - Amio held for rising LFTs, continue to hold  - Keep K > 4, Mag > 2.2     Cardiogenic shock requiring IABP (- , -)  - Likely 2/2 NSTEMI and ADHF  -  LHC: pLAD 100 % in-stent restenosis & mRCA, 100 %. PCWP 30. IABP placed.  -  TTE: LV dilated. EF 32 %. Regional WMAs present, mod (grade 2) LV diastolic dysfunction  -  TTE: EF 22% and + LV thrombus  - IABP swapped  to RFA, continue 1:1 support  - Off Milrinone gtt @ 7:30 am   - James d/c'd due to elevated K levels  - c/w coreg 25 BID for GDMT  - HIT and HAIDER sent (12/3) as per HF   - UNOS status 2 as of   -  - reduced hydralazine 100 TID to 75 TID and ISD 40 TID to 30 TID for AL reduction    NSTEMI iso stent re-occlusion of pLAD and 100%  of RCA  - EKG on admission w/ LBBB  - DAPT: c/w ASA, Brilinta d/c'd per transplant w/u  - c/w lipitor 80  - cMR deferred given necessity of IABP  - CT sx not recommending CABG, undergoing AT eval    LV thrombus  - c/w heparin gtt w/ therapeutic PTT    ===================== RENAL =========================  Non-oliguric ARIC, resolved   - Baseline Cr: 1-1.  - Renal US: no evidence of renal artery stenosis  - Trend BMP, lytes daily, replace as needed  - Continue Strict I/Os, avoid nephrotoxins    Mild Hyponatremia/Hyperkalemia iso ARIC  - Continue fluid restriction   - Urea powder d/c'd per renal  - Trend daily  - Continue monitoring urine output, lytes, SCr/ BUN  - Replete lytes prn with goal K >4 and Mg >2    =============== GASTROINTESTINAL===================  Constipation/ileus, resolved  - regular diet  - bowel reg prn    ===================ENDO====================  Type 2 DM  - A1c 8.3   - Continue lantus, premeal, low ISS  - continue FS    ===================HEMATOLOGIC/ONC ===================  - H/H & plts stable  - Monitor H/H and plts  - VTE PPX: heparin gtt    ==================INFECTIOUS DISEASE================  # Positive blood culture, resolved  - Repeat BCx drawn  for leukocytosis to 12k - positive on , G+ rods in anaerobic bottle, suspect contaminant  - Repeat cultures x2 sent  per transplant ID recs - no growth to date  - Vancomycin by trough (-)  - Final microbial ID: Cutibacterium acnes, per ID this is likely to be a skin contaminant, vanc dc'd.   - Dental eval  to rule out infectious etiology that would have led to bacteremia, no significant findings on exam.     # Enterococcus faecalis bacteremia, resolved  - BCx + for enterococcus faecalis x2, Staph epi x1 (likely contaminant)- pan sensitive   - Urine cx  + enterococcus faecalis  - BCx  no growth   - IABP site swapped to RFA   - s/p Vancomycin 1g q12h (-)  - CT A/P negative for infectious pathology    # COVID, resolved  - Off airborne precautions     # Pre-transplant ID w/u   - Trend ID recs for serologies   - Colonoscopy  - normal   - Chest CT  - improved LLL aeration  - s/p immunizations    ==================INFECTIOUS DISEASE================  # Upper Extremity Rash  - Patient developed bilateral upper extremity rash after receiving Hepatitis A & B immunizations on   - Dermatology consulted  - not likely to be related to vanco, can continue per ID recs  - Recommend clobetasol 0.05% BID to affected areas   - RVP repeated to rule out viral etiology, negative    Patient requires continuous monitoring with bedside rhythm monitoring, pulse ox monitoring, and intermittent blood gas analysis. Care plan discussed with ICU care team. Patient remained critical and at risk for life threatening decompensation.  Patient seen, examined and plan discussed with CCU team during rounds.     I have personally provided ____ minutes of critical care time excluding time spent on separate procedures, in addition to initial critical care time provided by the CICU Attending, Dr. Rowe.    By signing my name below, I, Sonia Preciado, attest that this documentation has been prepared under the direction and in the presence of Kitty Braden NP  Electronically signed: Rosalba Freitas, 12-10-23 @ 20:21    I, Kitty Braden NP, personally performed the services described in this documentation. all medical record entries made by the eileenibe were at my direction and in my presence. I have reviewed the chart and agree that the record reflects my personal performance and is accurate and complete  Electronically signed: Kitty Braden NP       DEVORAH VALENCIA  MRN-[id number]  Patient is a 58y old  Male who presents with a chief complaint of Cardiogenic shock (10 Dec 2023 06:45)    HPI:  pt seen and approx 1:30 pm  in [facility]    59yo M w/ hx HTN, CAD w/ 1 stent in , ICH () presenting with abn ekg. Patient presented to [facility] where he was found to have STEMI, recommended to get cath however patient did not want to get it there so it left and came here.  Patient initially had cough, congestion, fever, was placed on antibiotics on .  Started feeling nauseous and had a presyncopal event after which he presented to ED last night.  Had chest pain as well.  Chest pain is midsternal.  Not currently having chest pain.  Received 4 aspirin 30 min pta. (2023 15:11)      Hospital Course:    24 HOUR EVENTS:    REVIEW OF SYSTEMS:    CONSTITUTIONAL: No weakness, fevers or chills  EYES/ENT: No visual changes;  No vertigo or throat pain   NECK: No pain or stiffness  RESPIRATORY: No cough, wheezing, hemoptysis; No shortness of breath  CARDIOVASCULAR: No chest pain or palpitations  GASTROINTESTINAL: No abdominal or epigastric pain. No nausea, vomiting, or hematemesis; No diarrhea or constipation. No melena or hematochezia.  GENITOURINARY: No dysuria, frequency or hematuria  NEUROLOGICAL: No numbness or weakness  SKIN: No itching, rashes      ICU Vital Signs Last 24 Hrs  T(C): 37.1 (10 Dec 2023 19:00), Max: 37.1 (10 Dec 2023 16:00)  T(F): 98.7 (10 Dec 2023 19:00), Max: 98.7 (10 Dec 2023 16:00)  HR: 82 (10 Dec 2023 19:00) (69 - 93)  BP: --  BP(mean): --  ABP: --  ABP(mean): --  RR: 18 (10 Dec 2023 19:00) (14 - 27)  SpO2: 97% (10 Dec 2023 19:00) (97% - 100%)    O2 Parameters below as of 10 Dec 2023 19:00  Patient On (Oxygen Delivery Method): room air            CVP(mm Hg): --  CO: --  CI: --  PA: --  PA(mean): --  PA(direct): --  PCWP: --  LA: --  RA: --  SVR: --  SVRI: --  PVR: --  PVRI: --  I&O's Summary    09 Dec 2023 07:01  -  10 Dec 2023 07:00  --------------------------------------------------------  IN: 876 mL / OUT: 1560 mL / NET: -684 mL    10 Dec 2023 07:01  -  10 Dec 2023 20:21  --------------------------------------------------------  IN: 1074 mL / OUT: 850 mL / NET: 224 mL        CAPILLARY BLOOD GLUCOSE    CAPILLARY BLOOD GLUCOSE      POCT Blood Glucose.: 206 mg/dL (10 Dec 2023 16:50)      PHYSICAL EXAM:  Appearance: Normal, NAD  HEAD:  Normocephalic  EYES:  PERRLA, conjunctiva and sclera clear  NECK: Supple, No JVD  CHEST/LUNG: Clear to auscultation bilaterally; No wheezing  HEART: Normal S1, S2. No murmurs, rubs, or gallops  ABDOMEN: + Bowel sounds, Soft, NT, ND   EXTREMITIES:  2+ Peripheral Pulses, No clubbing, cyanosis, or edema  NEUROLOGY: non-focal, aaox3  SKIN: No rashes or lesions    ============================I/O===========================   I&O's Detail    09 Dec 2023 07:  -  10 Dec 2023 07:00  --------------------------------------------------------  IN:    Heparin: 336 mL    Oral Fluid: 540 mL  Total IN: 876 mL    OUT:    Voided (mL): 1560 mL  Total OUT: 1560 mL    Total NET: -684 mL      10 Dec 2023 07:01  -  10 Dec 2023 20:21  --------------------------------------------------------  IN:    Heparin: 154 mL    Oral Fluid: 920 mL  Total IN: 1074 mL    OUT:    Voided (mL): 850 mL  Total OUT: 850 mL    Total NET: 224 mL        ============================ LABS =========================                        11.3   9.46  )-----------( 135      ( 10 Dec 2023 17:45 )             34.8     12-10    136  |  106  |  32<H>  ----------------------------<  260<H>  3.7   |  17<L>  |  1.06    Ca    9.4      10 Dec 2023 17:45  Phos  2.4     12-10  Mg     1.8     12-10    TPro  6.4  /  Alb  3.6  /  TBili  0.2  /  DBili  x   /  AST  46<H>  /  ALT  136<H>  /  AlkPhos  66  10                LIVER FUNCTIONS - ( 10 Dec 2023 17:45 )  Alb: 3.6 g/dL / Pro: 6.4 g/dL / ALK PHOS: 66 U/L / ALT: 136 U/L / AST: 46 U/L / GGT: x           PT/INR - ( 10 Dec 2023 00:29 )   PT: 11.4 sec;   INR: 1.04 ratio         PTT - ( 10 Dec 2023 00:29 )  PTT:60.1 sec    Lactate, Blood: 1.9 mmol/L (12-10-23 @ 17:45)  Lactate, Blood: 1.3 mmol/L (23 @ 00:59)  Lactate, Blood: 1.5 mmol/L (23 @ 01:22)    Urinalysis Basic - ( 10 Dec 2023 17:45 )    Color: x / Appearance: x / SG: x / pH: x  Gluc: 260 mg/dL / Ketone: x  / Bili: x / Urobili: x   Blood: x / Protein: x / Nitrite: x   Leuk Esterase: x / RBC: x / WBC x   Sq Epi: x / Non Sq Epi: x / Bacteria: x      ======================Micro/Rad/Cardio=================  Telemtry: Reviewed   EKG: Reviewed  CXR: Reviewed  Culture: Reviewed   Echo:   Cath: Cardiac Cath Lab - Adult:   [facility]  Department of Cardiology  [address] [place] [address]  [phone]  Cath Lab Report -- Comprehensive Report  Patient: LD VALENCIA  Study date: 2017  Account number: [id number]  MR number: [id number]  : 1965  Gender: Male  Race: W  Case Physician(s):  Jairon Holguin M.D.  Referring Physician:  Luc Lynn M.D.  INDICATIONS: Unstable angina - CCS4.  HISTORY: The patient has a history of coronary artery disease. The patient  hashypertension and medication-treated dyslipidemia.  PROCEDURE:  --  Left heart catheterization with ventriculography.  --  Left coronary angiography.  --  Right coronary angiography.  TECHNIQUE: The risks and alternatives of the procedures and conscious  sedation were explained to the patient and informed consent was obtained.  Cardiac catheterization performed electively.  Local anesthetic given. Right radial artery access. A 6FR PRELUDE KIT was  inserted in the vessel. Left heart catheterization. Ventriculography was  performed. A 5FR FR4.0 EXPO catheter was utilized. Left coronary artery  angiography. The vessel was injected utilizing a 5FR FL3.5 EXPO catheter.  Right coronary artery angiography. The vessel was injected utilizing a 5FR  FR4.0 EXPO catheter. RADIATION EXPOSURE: 1.1 min.  CONTRAST GIVEN: Omnipaque 55 ml.  MEDICATIONS GIVEN: Midazolam, 1 mg, IV. Fentanyl, 25 mcg, IV. Verapamil  (Isoptin, Calan, Covera), 2.5 mg, IA. Heparin, 3000 units, IA.  VENTRICLES: Global left ventricular function was moderately depressed. EF  estimated was 40 %.  CORONARY VESSELS: The coronary circulation is right dominant.  LM:   --  LM: Normal.  LAD:   --  Proximal LAD: There was a 50 % stenosis.  CX:   --  Circumflex: Normal.  RCA:   --  Mid RCA: There was a 40 % stenosis.  --  Distal RCA: There was a 50 % stenosis.  COMPLICATIONS: There were no complications.  DIAGNOSTIC RECOMMENDATIONS: The patient should continue with the present  medications.  Prepared and signed by  Jairon Holguin M.D.  Signed 2017 12:20:13  HEMODYNAMIC TABLES  Pressures:  Baseline/ Room Air  Pressures:  - HR: 78  Pressures:  - Rhythm:  Pressures:  -- Aortic Pressure (S/D/M): --/--/99  Pressures:  -- Left Ventricle (s/edp): 157/39/--  Outputs:  Baseline/ Room Air  Outputs:  -- CALCULATIONS[age]  Outputs:  -- CALCULATIONS: Body Surface Area: 2.05  Outputs:  -- CALCULATIONS: Height in cm: 175.00  Outputs:  -- CALCULATIONS: Sex: Male  Outputs:  -- CALCULATIONS: Weight in k.40 (17 @ 21:55)    ======================================================  PAST MEDICAL & SURGICAL HISTORY:  HTN (hypertension)      CAD (coronary artery disease)  ; stent      Intracranial hemorrhage        Respiratory arrest        Myocardial infarction, unspecified MI type, unspecified artery      History of coronary artery stent placement      ====================ASSESSMENT ==============  58 male with HTN, CAD (s/p PCI ), HFrEF, CVA , and T2DM presenting with chest pressure and unknown tachycardia that was shocked x1, [facility]  found to have in-stent restenosis of pLAD and  of RCA with elevated RA and PA pressures and severely decreased. Admitted to CICU for management of cardiogenic shock and ADHF requiring IABP  -, with hospital course c/b vfib arrest requiring reinsertion of IABP. Not recommended for ATs per HF. Currently listed for transplant status 2.    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 AT evaluation launched 11/10, he is ABO A. He was discussed during TSC on  and was approved for listing, however was found to be Bacteremic with  Blc Cx growing mixed cultures Staph epi and Enterococcus faecalis and started on IV Vanco. Repeat cultures from  reveal NGTD and therefore was cleared by ID for listing.     He appears adequately supported on IABP at 1:1, electrically quiescent on oral Amiodarone. Cr is improving with holding diuretics. Labs otherwise notable for worsening thrombocytopenia. Awaiting suitable donor. Now with GM + rods in blood culture on  (reported on ), restarted on vancomycin, and status 7, repeat cultures now negative x48 hours (),  now status 2 again.       ====================== NEUROLOGY=====================  Anxiety  - No Seroquel/antipsychotics since vfib arrest and prolonged QTC  - Psych Eval, recommended SSRI, but pt. refused   - Psych recommending atarax PRN  - Continue to monitor mental status    PT/Conditioning  - Continue band exercised while on bedrest s/t IABP    ==================== RESPIRATORY======================  Acute Hypoxemic Respiratory Failure  - s/p x2 intubations for cardiogenic pulm edema and the in setting of cardiac arrest, resolved - extubated 11/10  - Currently maintaining >95% sats on room air  - Continue incentive spirometry and monitoring of sp02    Asthma  - c/w albuterol, symbicort and spiriva  - On trelegy at home  - Continue to monitor SpO2 with goal >94%    ====================CARDIOVASCULAR==================  Vfib arrest i/s/o ischemia  - Lido gtt off   - PO Amio load - total of 5g per EP complete , continue PO amio  - Amio held for rising LFTs, continue to hold  - Keep K > 4, Mag > 2.2     Cardiogenic shock requiring IABP (- , -)  - Likely 2/2 NSTEMI and ADHF  -  LHC: pLAD 100 % in-stent restenosis & mRCA, 100 %. PCWP 30. IABP placed.  -  TTE: LV dilated. EF 32 %. Regional WMAs present, mod (grade 2) LV diastolic dysfunction  -  TTE: EF 22% and + LV thrombus  - IABP swapped  to RFA, continue 1:1 support  - Off Milrinone gtt @ 7:30 am   - James d/c'd due to elevated K levels  - c/w coreg 25 BID for GDMT  - HIT and HAIDER sent (12/3) as per HF   - UNOS status 2 as of   -  - reduced hydralazine 100 TID to 75 TID and ISD 40 TID to 30 TID for AL reduction    NSTEMI iso stent re-occlusion of pLAD and 100%  of RCA  - EKG on admission w/ LBBB  - DAPT: c/w ASA, Brilinta d/c'd per transplant w/u  - c/w lipitor 80  - cMR deferred given necessity of IABP  - CT sx not recommending CABG, undergoing AT eval    LV thrombus  - c/w heparin gtt w/ therapeutic PTT    ===================== RENAL =========================  Non-oliguric ARIC, resolved   - Baseline Cr: 1-1.  - Renal US: no evidence of renal artery stenosis  - Trend BMP, lytes daily, replace as needed  - Continue Strict I/Os, avoid nephrotoxins    Mild Hyponatremia/Hyperkalemia iso ARIC  - Continue fluid restriction   - Urea powder d/c'd per renal  - Trend daily  - Continue monitoring urine output, lytes, SCr/ BUN  - Replete lytes prn with goal K >4 and Mg >2    =============== GASTROINTESTINAL===================  Constipation/ileus, resolved  - regular diet  - bowel reg prn    ===================ENDO====================  Type 2 DM  - A1c 8.3   - Continue lantus, premeal, low ISS  - continue FS    ===================HEMATOLOGIC/ONC ===================  - H/H & plts stable  - Monitor H/H and plts  - VTE PPX: heparin gtt    ==================INFECTIOUS DISEASE================  # Positive blood culture, resolved  - Repeat BCx drawn  for leukocytosis to 12k - positive on , G+ rods in anaerobic bottle, suspect contaminant  - Repeat cultures x2 sent  per transplant ID recs - no growth to date  - Vancomycin by trough (-)  - Final microbial ID: Cutibacterium acnes, per ID this is likely to be a skin contaminant, vanc dc'd.   - Dental eval  to rule out infectious etiology that would have led to bacteremia, no significant findings on exam.     # Enterococcus faecalis bacteremia, resolved  - BCx + for enterococcus faecalis x2, Staph epi x1 (likely contaminant)- pan sensitive   - Urine cx  + enterococcus faecalis  - BCx  no growth   - IABP site swapped to RFA   - s/p Vancomycin 1g q12h (-)  - CT A/P negative for infectious pathology    # COVID, resolved  - Off airborne precautions     # Pre-transplant ID w/u   - Trend ID recs for serologies   - Colonoscopy  - normal   - Chest CT  - improved LLL aeration  - s/p immunizations    ==================INFECTIOUS DISEASE================  # Upper Extremity Rash  - Patient developed bilateral upper extremity rash after receiving Hepatitis A & B immunizations on   - Dermatology consulted  - not likely to be related to vanco, can continue per ID recs  - Recommend clobetasol 0.05% BID to affected areas   - RVP repeated to rule out viral etiology, negative    Patient requires continuous monitoring with bedside rhythm monitoring, pulse ox monitoring, and intermittent blood gas analysis. Care plan discussed with ICU care team. Patient remained critical and at risk for life threatening decompensation.  Patient seen, examined and plan discussed with CCU team during rounds.     I have personally provided ____ minutes of critical care time excluding time spent on separate procedures, in addition to initial critical care time provided by the CICU Attending, Dr. Rowe.    By signing my name below, I, Sonia Preciado, attest that this documentation has been prepared under the direction and in the presence of Kitty Braden NP  Electronically signed: Rosalba Freitas, 12-10-23 @ 20:21    I, Kitty Braden NP, personally performed the services described in this documentation. all medical record entries made by the eileenibe were at my direction and in my presence. I have reviewed the chart and agree that the record reflects my personal performance and is accurate and complete  Electronically signed: Kitty Braden NP       DEVORAH VALENCIA  MRN-[id number]  Patient is a 58y old  Male who presents with a chief complaint of Cardiogenic shock (10 Dec 2023 06:45)    HPI: 59yo M w/ hx HTN, CAD w/ 1 stent in , ICH () presenting with abn ekg. Patient presented to [facility] where he was found to have STEMI, recommended to get cath however patient did not want to get it there so it left and came here.  Patient initially had cough, congestion, fever, was placed on antibiotics on .  Started feeling nauseous and had a presyncopal event after which he presented to ED last night.  Had chest pain as well.  Chest pain is midsternal.  Not currently having chest pain.  Received 4 aspirin 30 min pta. (2023 15:11)      Hospital Course:    24 HOUR EVENTS:    REVIEW OF SYSTEMS:    CONSTITUTIONAL: No weakness, fevers or chills  EYES/ENT: No visual changes;  No vertigo or throat pain   NECK: No pain or stiffness  RESPIRATORY: No cough, wheezing, hemoptysis; No shortness of breath  CARDIOVASCULAR: No chest pain or palpitations  GASTROINTESTINAL: No abdominal or epigastric pain. No nausea, vomiting, or hematemesis; No diarrhea or constipation. No melena or hematochezia.  GENITOURINARY: No dysuria, frequency or hematuria  NEUROLOGICAL: No numbness or weakness  SKIN: No itching, rashes      ICU Vital Signs Last 24 Hrs  T(C): 37.1 (10 Dec 2023 19:00), Max: 37.1 (10 Dec 2023 16:00)  T(F): 98.7 (10 Dec 2023 19:00), Max: 98.7 (10 Dec 2023 16:00)  HR: 82 (10 Dec 2023 19:00) (69 - 93)  BP: --  BP(mean): --  ABP: --  ABP(mean): --  RR: 18 (10 Dec 2023 19:00) (14 - 27)  SpO2: 97% (10 Dec 2023 19:00) (97% - 100%)    O2 Parameters below as of 10 Dec 2023 19:00  Patient On (Oxygen Delivery Method): room air            CVP(mm Hg): --  CO: --  CI: --  PA: --  PA(mean): --  PA(direct): --  PCWP: --  LA: --  RA: --  SVR: --  SVRI: --  PVR: --  PVRI: --  I&O's Summary    09 Dec 2023 07:01  -  10 Dec 2023 07:00  --------------------------------------------------------  IN: 876 mL / OUT: 1560 mL / NET: -684 mL    10 Dec 2023 07:01  -  10 Dec 2023 20:21  --------------------------------------------------------  IN: 1074 mL / OUT: 850 mL / NET: 224 mL        CAPILLARY BLOOD GLUCOSE    CAPILLARY BLOOD GLUCOSE      POCT Blood Glucose.: 206 mg/dL (10 Dec 2023 16:50)      PHYSICAL EXAM:  Appearance: Normal, NAD  HEAD:  Normocephalic  EYES:  PERRLA, conjunctiva and sclera clear  NECK: Supple, No JVD  CHEST/LUNG: Clear to auscultation bilaterally; No wheezing  HEART: Normal S1, S2. No murmurs, rubs, or gallops  ABDOMEN: + Bowel sounds, Soft, NT, ND   EXTREMITIES:  2+ Peripheral Pulses, No clubbing, cyanosis, or edema  NEUROLOGY: non-focal, aaox3  SKIN: No rashes or lesions    ============================I/O===========================   I&O's Detail    09 Dec 2023 07:01  -  10 Dec 2023 07:00  --------------------------------------------------------  IN:    Heparin: 336 mL    Oral Fluid: 540 mL  Total IN: 876 mL    OUT:    Voided (mL): 1560 mL  Total OUT: 1560 mL    Total NET: -684 mL      10 Dec 2023 07:01  -  10 Dec 2023 20:21  --------------------------------------------------------  IN:    Heparin: 154 mL    Oral Fluid: 920 mL  Total IN: 1074 mL    OUT:    Voided (mL): 850 mL  Total OUT: 850 mL    Total NET: 224 mL        ============================ LABS =========================                        11.3   9.46  )-----------( 135      ( 10 Dec 2023 17:45 )             34.8     12-10    136  |  106  |  32<H>  ----------------------------<  260<H>  3.7   |  17<L>  |  1.06    Ca    9.4      10 Dec 2023 17:45  Phos  2.4     12-10  Mg     1.8     12-10    TPro  6.4  /  Alb  3.6  /  TBili  0.2  /  DBili  x   /  AST  46<H>  /  ALT  136<H>  /  AlkPhos  66  1210                LIVER FUNCTIONS - ( 10 Dec 2023 17:45 )  Alb: 3.6 g/dL / Pro: 6.4 g/dL / ALK PHOS: 66 U/L / ALT: 136 U/L / AST: 46 U/L / GGT: x           PT/INR - ( 10 Dec 2023 00:29 )   PT: 11.4 sec;   INR: 1.04 ratio         PTT - ( 10 Dec 2023 00:29 )  PTT:60.1 sec    Lactate, Blood: 1.9 mmol/L (12-10-23 @ 17:45)  Lactate, Blood: 1.3 mmol/L (23 @ 00:59)  Lactate, Blood: 1.5 mmol/L (12-08-23 @ 01:22)    Urinalysis Basic - ( 10 Dec 2023 17:45 )    Color: x / Appearance: x / SG: x / pH: x  Gluc: 260 mg/dL / Ketone: x  / Bili: x / Urobili: x   Blood: x / Protein: x / Nitrite: x   Leuk Esterase: x / RBC: x / WBC x   Sq Epi: x / Non Sq Epi: x / Bacteria: x      ======================Micro/Rad/Cardio=================  Telemtry: Reviewed   EKG: Reviewed  CXR: Reviewed  Culture: Reviewed   Echo:   Cath: Cardiac Cath Lab - Adult:   [facility]  Department of Cardiology  [address] [place] [address]  [phone]  Cath Lab Report -- Comprehensive Report  Patient: LD VALENCIA  Study date: 2017  Account number: [id number]  MR number: [id number]  : 1965  Gender: Male  Race: W  Case Physician(s):  Jairon Holguin M.D.  Referring Physician:  Luc Lynn M.D.  INDICATIONS: Unstable angina - CCS4.  HISTORY: The patient has a history of coronary artery disease. The patient  hashypertension and medication-treated dyslipidemia.  PROCEDURE:  --  Left heart catheterization with ventriculography.  --  Left coronary angiography.  --  Right coronary angiography.  TECHNIQUE: The risks and alternatives of the procedures and conscious  sedation were explained to the patient and informed consent was obtained.  Cardiac catheterization performed electively.  Local anesthetic given. Right radial artery access. A 6FR PRELUDE KIT was  inserted in the vessel. Left heart catheterization. Ventriculography was  performed. A 5FR FR4.0 EXPO catheter was utilized. Left coronary artery  angiography. The vessel was injected utilizing a 5FR FL3.5 EXPO catheter.  Right coronary artery angiography. The vessel was injected utilizing a 5FR  FR4.0 EXPO catheter. RADIATION EXPOSURE: 1.1 min.  CONTRAST GIVEN: Omnipaque 55 ml.  MEDICATIONS GIVEN: Midazolam, 1 mg, IV. Fentanyl, 25 mcg, IV. Verapamil  (Isoptin, Calan, Covera), 2.5 mg, IA. Heparin, 3000 units, IA.  VENTRICLES: Global left ventricular function was moderately depressed. EF  estimated was 40 %.  CORONARY VESSELS: The coronary circulation is right dominant.  LM:   --  LM: Normal.  LAD:   --  Proximal LAD: There was a 50 % stenosis.  CX:   --  Circumflex: Normal.  RCA:   --  Mid RCA: There was a 40 % stenosis.  --  Distal RCA: There was a 50 % stenosis.  COMPLICATIONS: There were no complications.  DIAGNOSTIC RECOMMENDATIONS: The patient should continue with the present  medications.  Prepared and signed by  Jairon Holguin M.D.  Signed 2017 12:20:13  HEMODYNAMIC TABLES  Pressures:  Baseline/ Room Air  Pressures:  - HR: 78  Pressures:  - Rhythm:  Pressures:  -- Aortic Pressure (S/D/M): --/--/99  Pressures:  -- Left Ventricle (s/edp): 157/39/--  Outputs:  Baseline/ Room Air  Outputs:  -- CALCULATIONS[age]  Outputs:  -- CALCULATIONS: Body Surface Area: 2.05  Outputs:  -- CALCULATIONS: Height in cm: 175.00  Outputs:  -- CALCULATIONS: Sex: Male  Outputs:  -- CALCULATIONS: Weight in k.40 (17 @ 21:55)    ======================================================  PAST MEDICAL & SURGICAL HISTORY:  HTN (hypertension)      CAD (coronary artery disease)  ; stent      Intracranial hemorrhage        Respiratory arrest        Myocardial infarction, unspecified MI type, unspecified artery      History of coronary artery stent placement      ====================ASSESSMENT ==============  58 male with HTN, CAD (s/p PCI ), HFrEF, CVA , and T2DM presenting with chest pressure and unknown tachycardia that was shocked x1, [facility]  found to have in-stent restenosis of pLAD and  of RCA with elevated RA and PA pressures and severely decreased. Admitted to CICU for management of cardiogenic shock and ADHF requiring IABP  -, with hospital course c/b vfib arrest requiring reinsertion of IABP. Not recommended for ATs per HF. Currently listed for transplant status 2.    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 AT evaluation launched 11/10, he is ABO A. He was discussed during TSC on  and was approved for listing, however was found to be Bacteremic with  Blc Cx growing mixed cultures Staph epi and Enterococcus faecalis and started on IV Vanco. Repeat cultures from  reveal NGTD and therefore was cleared by ID for listing.     He appears adequately supported on IABP at 1:1, electrically quiescent on oral Amiodarone. Cr is improving with holding diuretics. Labs otherwise notable for worsening thrombocytopenia. Awaiting suitable donor. Now with GM + rods in blood culture on  (reported on ), restarted on vancomycin, and status 7, repeat cultures now negative x48 hours (),  now status 2 again.       ====================== NEUROLOGY=====================  Anxiety  - No Seroquel/antipsychotics since vfib arrest and prolonged QTC  - Psych Eval, recommended SSRI, but pt. refused   - Psych recommending atarax PRN  - Continue to monitor mental status    PT/Conditioning  - Continue band exercised while on bedrest s/t IABP    ==================== RESPIRATORY======================  Acute Hypoxemic Respiratory Failure  - s/p x2 intubations for cardiogenic pulm edema and the in setting of cardiac arrest, resolved - extubated 11/10  - Currently maintaining >95% sats on room air  - Continue incentive spirometry and monitoring of sp02    Asthma  - c/w albuterol, symbicort and spiriva  - On trelegy at home  - Continue to monitor SpO2 with goal >94%    ====================CARDIOVASCULAR==================  Vfib arrest i/s/o ischemia  - Lido gtt off   - PO Amio load - total of 5g per EP complete , continue PO amio  - Amio held for rising LFTs, continue to hold  - Keep K > 4, Mag > 2.2     Cardiogenic shock requiring IABP (- , -)  - Likely 2/2 NSTEMI and ADHF  -  LHC: pLAD 100 % in-stent restenosis & mRCA, 100 %. PCWP 30. IABP placed.  -  TTE: LV dilated. EF 32 %. Regional WMAs present, mod (grade 2) LV diastolic dysfunction  -  TTE: EF 22% and + LV thrombus  - IABP swapped  to RFA, continue 1:1 support  - Off Milrinone gtt @ 7:30 am   - [place] d/c'd due to elevated K levels  - c/w coreg 25 BID for GDMT  - HIT and HAIDER sent (12/3) as per HF   - UNOS status 2 as of   -  - reduced hydralazine 100 TID to 75 TID and ISD 40 TID to 30 TID for AL reduction    NSTEMI iso stent re-occlusion of pLAD and 100%  of RCA  - EKG on admission w/ LBBB  - DAPT: c/w ASA, Brilinta d/c'd per transplant w/u  - c/w lipitor 80  - cMR deferred given necessity of IABP  - CT sx not recommending CABG, undergoing AT eval    LV thrombus  - c/w heparin gtt w/ therapeutic PTT    ===================== RENAL =========================  Non-oliguric ARIC, resolved   - Baseline Cr: 1-  - Renal US: no evidence of renal artery stenosis  - Trend BMP, lytes daily, replace as needed  - Continue Strict I/Os, avoid nephrotoxins    Mild Hyponatremia/Hyperkalemia iso ARIC  - Continue fluid restriction   - Urea powder d/c'd per renal  - Trend daily  - Continue monitoring urine output, lytes, SCr/ BUN  - Replete lytes prn with goal K >4 and Mg >2    =============== GASTROINTESTINAL===================  Constipation/ileus, resolved  - regular diet  - bowel reg prn    ===================ENDO====================  Type 2 DM  - A1c 8.3   - Continue lantus, premeal, low ISS  - continue FS    ===================HEMATOLOGIC/ONC ===================  - H/H & plts stable  - Monitor H/H and plts  - VTE PPX: heparin gtt    ==================INFECTIOUS DISEASE================  # Positive blood culture, resolved  - Repeat BCx drawn  for leukocytosis to 12k - positive on , G+ rods in anaerobic bottle, suspect contaminant  - Repeat cultures x2 sent  per transplant ID recs - no growth to date  - Vancomycin by trough (-)  - Final microbial ID: Cutibacterium acnes, per ID this is likely to be a skin contaminant, vanc dc'd.   - Dental eval  to rule out infectious etiology that would have led to bacteremia, no significant findings on exam.     # Enterococcus faecalis bacteremia, resolved  - BCx + for enterococcus faecalis x2, Staph epi x1 (likely contaminant)- pan sensitive   - Urine cx  + enterococcus faecalis  - BCx  no growth   - IABP site swapped to RFA   - s/p Vancomycin 1g q12h (-)  - CT A/P negative for infectious pathology    # COVID, resolved  - Off airborne precautions     # Pre-transplant ID w/u   - Trend ID recs for serologies   - Colonoscopy  - normal   - Chest CT  - improved LLL aeration  - s/p immunizations    ==================INFECTIOUS DISEASE================  # Upper Extremity Rash  - Patient developed bilateral upper extremity rash after receiving Hepatitis A & B immunizations on 11/24  - Dermatology consulted  - not likely to be related to vanco, can continue per ID recs  - Recommend clobetasol 0.05% BID to affected areas   - RVP repeated to rule out viral etiology, negative    Patient requires continuous monitoring with bedside rhythm monitoring, pulse ox monitoring, and intermittent blood gas analysis. Care plan discussed with ICU care team. Patient remained critical and at risk for life threatening decompensation.  Patient seen, examined and plan discussed with CCU team during rounds.     I have personally provided 30 minutes of critical care time excluding time spent on separate procedures, in addition to initial critical care time provided by the CICU Attending, Dr. Rowe.    By signing my name below, I, Sonia Preciado, attest that this documentation has been prepared under the direction and in the presence of Kitty Braden NP  Electronically signed: Rosalba Freitas, 12-10-23 @ 20:21    I, Kitty Braden NP, personally performed the services described in this documentation. all medical record entries made by the eileenibmartha were at my direction and in my presence. I have reviewed the chart and agree that the record reflects my personal performance and is accurate and complete  Electronically signed: Kitty Braden NP    ======================= DISPOSITION  =====================  [X] Critical   [ ] Guarded    [ ] Stable    [X] Maintain in CICU  [ ] Downgrade to Telemtry  [ ] Discharge Home    Patient requires continuous monitoring with bedside rhythm monitoring, pulse ox monitoring, and intermittent blood gas analysis. Care plan discussed with ICU care team. Patient remained critical and at risk for life threatening decompensation.  Patient seen, examined and plan discussed with CCU team during rounds.     I have personally provided 30 minutes of critical care time excluding time spent on separate procedures, in addition to initial critical care time provided by the CICU Attending, Dr. Jae Hawthorne [facility] [phone]       DEVORAH VALENCIA  MRN-[id number]  Patient is a 58y old  Male who presents with a chief complaint of Cardiogenic shock (10 Dec 2023 06:45)    HPI: 57yo M w/ hx HTN, CAD w/ 1 stent in , ICH () presenting with abn ekg. Patient presented to [facility] where he was found to have STEMI, recommended to get cath however patient did not want to get it there so it left and came here.  Patient initially had cough, congestion, fever, was placed on antibiotics on .  Started feeling nauseous and had a presyncopal event after which he presented to ED last night.  Had chest pain as well.  Chest pain is midsternal.  Not currently having chest pain.  Received 4 aspirin 30 min pta. (2023 15:11)      Hospital Course:    24 HOUR EVENTS:    REVIEW OF SYSTEMS:    CONSTITUTIONAL: No weakness, fevers or chills  EYES/ENT: No visual changes;  No vertigo or throat pain   NECK: No pain or stiffness  RESPIRATORY: No cough, wheezing, hemoptysis; No shortness of breath  CARDIOVASCULAR: No chest pain or palpitations  GASTROINTESTINAL: No abdominal or epigastric pain. No nausea, vomiting, or hematemesis; No diarrhea or constipation. No melena or hematochezia.  GENITOURINARY: No dysuria, frequency or hematuria  NEUROLOGICAL: No numbness or weakness  SKIN: No itching, rashes      ICU Vital Signs Last 24 Hrs  T(C): 37.1 (10 Dec 2023 19:00), Max: 37.1 (10 Dec 2023 16:00)  T(F): 98.7 (10 Dec 2023 19:00), Max: 98.7 (10 Dec 2023 16:00)  HR: 82 (10 Dec 2023 19:00) (69 - 93)  BP: --  BP(mean): --  ABP: --  ABP(mean): --  RR: 18 (10 Dec 2023 19:00) (14 - 27)  SpO2: 97% (10 Dec 2023 19:00) (97% - 100%)    O2 Parameters below as of 10 Dec 2023 19:00  Patient On (Oxygen Delivery Method): room air            CVP(mm Hg): --  CO: --  CI: --  PA: --  PA(mean): --  PA(direct): --  PCWP: --  LA: --  RA: --  SVR: --  SVRI: --  PVR: --  PVRI: --  I&O's Summary    09 Dec 2023 07:01  -  10 Dec 2023 07:00  --------------------------------------------------------  IN: 876 mL / OUT: 1560 mL / NET: -684 mL    10 Dec 2023 07:01  -  10 Dec 2023 20:21  --------------------------------------------------------  IN: 1074 mL / OUT: 850 mL / NET: 224 mL        CAPILLARY BLOOD GLUCOSE    CAPILLARY BLOOD GLUCOSE      POCT Blood Glucose.: 206 mg/dL (10 Dec 2023 16:50)      PHYSICAL EXAM:  Appearance: Normal, NAD  HEAD:  Normocephalic  EYES:  PERRLA, conjunctiva and sclera clear  NECK: Supple, No JVD  CHEST/LUNG: Clear to auscultation bilaterally; No wheezing  HEART: Normal S1, S2. No murmurs, rubs, or gallops  ABDOMEN: + Bowel sounds, Soft, NT, ND   EXTREMITIES:  2+ Peripheral Pulses, No clubbing, cyanosis, or edema  NEUROLOGY: non-focal, aaox3  SKIN: No rashes or lesions    ============================I/O===========================   I&O's Detail    09 Dec 2023 07:01  -  10 Dec 2023 07:00  --------------------------------------------------------  IN:    Heparin: 336 mL    Oral Fluid: 540 mL  Total IN: 876 mL    OUT:    Voided (mL): 1560 mL  Total OUT: 1560 mL    Total NET: -684 mL      10 Dec 2023 07:01  -  10 Dec 2023 20:21  --------------------------------------------------------  IN:    Heparin: 154 mL    Oral Fluid: 920 mL  Total IN: 1074 mL    OUT:    Voided (mL): 850 mL  Total OUT: 850 mL    Total NET: 224 mL        ============================ LABS =========================                        11.3   9.46  )-----------( 135      ( 10 Dec 2023 17:45 )             34.8     12-10    136  |  106  |  32<H>  ----------------------------<  260<H>  3.7   |  17<L>  |  1.06    Ca    9.4      10 Dec 2023 17:45  Phos  2.4     12-10  Mg     1.8     12-10    TPro  6.4  /  Alb  3.6  /  TBili  0.2  /  DBili  x   /  AST  46<H>  /  ALT  136<H>  /  AlkPhos  66  1210                LIVER FUNCTIONS - ( 10 Dec 2023 17:45 )  Alb: 3.6 g/dL / Pro: 6.4 g/dL / ALK PHOS: 66 U/L / ALT: 136 U/L / AST: 46 U/L / GGT: x           PT/INR - ( 10 Dec 2023 00:29 )   PT: 11.4 sec;   INR: 1.04 ratio         PTT - ( 10 Dec 2023 00:29 )  PTT:60.1 sec    Lactate, Blood: 1.9 mmol/L (12-10-23 @ 17:45)  Lactate, Blood: 1.3 mmol/L (23 @ 00:59)  Lactate, Blood: 1.5 mmol/L (12-08-23 @ 01:22)    Urinalysis Basic - ( 10 Dec 2023 17:45 )    Color: x / Appearance: x / SG: x / pH: x  Gluc: 260 mg/dL / Ketone: x  / Bili: x / Urobili: x   Blood: x / Protein: x / Nitrite: x   Leuk Esterase: x / RBC: x / WBC x   Sq Epi: x / Non Sq Epi: x / Bacteria: x      ======================Micro/Rad/Cardio=================  Telemtry: Reviewed   EKG: Reviewed  CXR: Reviewed  Culture: Reviewed   Echo:   Cath: Cardiac Cath Lab - Adult:   [facility]  Department of Cardiology  [address] [place] [address]  [phone]  Cath Lab Report -- Comprehensive Report  Patient: LD VALENCIA  Study date: 2017  Account number: [id number]  MR number: [id number]  : 1965  Gender: Male  Race: W  Case Physician(s):  Jairon Holguin M.D.  Referring Physician:  Luc Lynn M.D.  INDICATIONS: Unstable angina - CCS4.  HISTORY: The patient has a history of coronary artery disease. The patient  hashypertension and medication-treated dyslipidemia.  PROCEDURE:  --  Left heart catheterization with ventriculography.  --  Left coronary angiography.  --  Right coronary angiography.  TECHNIQUE: The risks and alternatives of the procedures and conscious  sedation were explained to the patient and informed consent was obtained.  Cardiac catheterization performed electively.  Local anesthetic given. Right radial artery access. A 6FR PRELUDE KIT was  inserted in the vessel. Left heart catheterization. Ventriculography was  performed. A 5FR FR4.0 EXPO catheter was utilized. Left coronary artery  angiography. The vessel was injected utilizing a 5FR FL3.5 EXPO catheter.  Right coronary artery angiography. The vessel was injected utilizing a 5FR  FR4.0 EXPO catheter. RADIATION EXPOSURE: 1.1 min.  CONTRAST GIVEN: Omnipaque 55 ml.  MEDICATIONS GIVEN: Midazolam, 1 mg, IV. Fentanyl, 25 mcg, IV. Verapamil  (Isoptin, Calan, Covera), 2.5 mg, IA. Heparin, 3000 units, IA.  VENTRICLES: Global left ventricular function was moderately depressed. EF  estimated was 40 %.  CORONARY VESSELS: The coronary circulation is right dominant.  LM:   --  LM: Normal.  LAD:   --  Proximal LAD: There was a 50 % stenosis.  CX:   --  Circumflex: Normal.  RCA:   --  Mid RCA: There was a 40 % stenosis.  --  Distal RCA: There was a 50 % stenosis.  COMPLICATIONS: There were no complications.  DIAGNOSTIC RECOMMENDATIONS: The patient should continue with the present  medications.  Prepared and signed by  Jairon Holguin M.D.  Signed 2017 12:20:13  HEMODYNAMIC TABLES  Pressures:  Baseline/ Room Air  Pressures:  - HR: 78  Pressures:  - Rhythm:  Pressures:  -- Aortic Pressure (S/D/M): --/--/99  Pressures:  -- Left Ventricle (s/edp): 157/39/--  Outputs:  Baseline/ Room Air  Outputs:  -- CALCULATIONS[age]  Outputs:  -- CALCULATIONS: Body Surface Area: 2.05  Outputs:  -- CALCULATIONS: Height in cm: 175.00  Outputs:  -- CALCULATIONS: Sex: Male  Outputs:  -- CALCULATIONS: Weight in k.40 (17 @ 21:55)    ======================================================  PAST MEDICAL & SURGICAL HISTORY:  HTN (hypertension)      CAD (coronary artery disease)  ; stent      Intracranial hemorrhage        Respiratory arrest        Myocardial infarction, unspecified MI type, unspecified artery      History of coronary artery stent placement      ====================ASSESSMENT ==============  58 male with HTN, CAD (s/p PCI ), HFrEF, CVA , and T2DM presenting with chest pressure and unknown tachycardia that was shocked x1, [facility]  found to have in-stent restenosis of pLAD and  of RCA with elevated RA and PA pressures and severely decreased. Admitted to CICU for management of cardiogenic shock and ADHF requiring IABP  -, with hospital course c/b vfib arrest requiring reinsertion of IABP. Not recommended for ATs per HF. Currently listed for transplant status 2.    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 AT evaluation launched 11/10, he is ABO A. He was discussed during TSC on  and was approved for listing, however was found to be Bacteremic with  Blc Cx growing mixed cultures Staph epi and Enterococcus faecalis and started on IV Vanco. Repeat cultures from  reveal NGTD and therefore was cleared by ID for listing.     He appears adequately supported on IABP at 1:1, electrically quiescent on oral Amiodarone. Cr is improving with holding diuretics. Labs otherwise notable for worsening thrombocytopenia. Awaiting suitable donor. Now with GM + rods in blood culture on  (reported on ), restarted on vancomycin, and status 7, repeat cultures now negative x48 hours (),  now status 2 again.       ====================== NEUROLOGY=====================  Anxiety  - No Seroquel/antipsychotics since vfib arrest and prolonged QTC  - Psych Eval, recommended SSRI, but pt. refused   - Psych recommending atarax PRN  - Continue to monitor mental status    PT/Conditioning  - Continue band exercised while on bedrest s/t IABP    ==================== RESPIRATORY======================  Acute Hypoxemic Respiratory Failure  - s/p x2 intubations for cardiogenic pulm edema and the in setting of cardiac arrest, resolved - extubated 11/10  - Currently maintaining >95% sats on room air  - Continue incentive spirometry and monitoring of sp02    Asthma  - c/w albuterol, symbicort and spiriva  - On trelegy at home  - Continue to monitor SpO2 with goal >94%    ====================CARDIOVASCULAR==================  Vfib arrest i/s/o ischemia  - Lido gtt off   - PO Amio load - total of 5g per EP complete , continue PO amio  - Amio held for rising LFTs, continue to hold  - Keep K > 4, Mag > 2.2     Cardiogenic shock requiring IABP (- , -)  - Likely 2/2 NSTEMI and ADHF  -  LHC: pLAD 100 % in-stent restenosis & mRCA, 100 %. PCWP 30. IABP placed.  -  TTE: LV dilated. EF 32 %. Regional WMAs present, mod (grade 2) LV diastolic dysfunction  -  TTE: EF 22% and + LV thrombus  - IABP swapped  to RFA, continue 1:1 support  - Off Milrinone gtt @ 7:30 am   - [place] d/c'd due to elevated K levels  - c/w coreg 25 BID for GDMT  - HIT and HAIDER sent (12/3) as per HF   - UNOS status 2 as of   -  - reduced hydralazine 100 TID to 75 TID and ISD 40 TID to 30 TID for AL reduction    NSTEMI iso stent re-occlusion of pLAD and 100%  of RCA  - EKG on admission w/ LBBB  - DAPT: c/w ASA, Brilinta d/c'd per transplant w/u  - c/w lipitor 80  - cMR deferred given necessity of IABP  - CT sx not recommending CABG, undergoing AT eval    LV thrombus  - c/w heparin gtt w/ therapeutic PTT    ===================== RENAL =========================  Non-oliguric ARIC, resolved   - Baseline Cr: 1-  - Renal US: no evidence of renal artery stenosis  - Trend BMP, lytes daily, replace as needed  - Continue Strict I/Os, avoid nephrotoxins    Mild Hyponatremia/Hyperkalemia iso ARIC  - Continue fluid restriction   - Urea powder d/c'd per renal  - Trend daily  - Continue monitoring urine output, lytes, SCr/ BUN  - Replete lytes prn with goal K >4 and Mg >2    =============== GASTROINTESTINAL===================  Constipation/ileus, resolved  - regular diet  - bowel reg prn    ===================ENDO====================  Type 2 DM  - A1c 8.3   - Continue lantus, premeal, low ISS  - continue FS    ===================HEMATOLOGIC/ONC ===================  - H/H & plts stable  - Monitor H/H and plts  - VTE PPX: heparin gtt    ==================INFECTIOUS DISEASE================  # Positive blood culture, resolved  - Repeat BCx drawn  for leukocytosis to 12k - positive on , G+ rods in anaerobic bottle, suspect contaminant  - Repeat cultures x2 sent  per transplant ID recs - no growth to date  - Vancomycin by trough (-)  - Final microbial ID: Cutibacterium acnes, per ID this is likely to be a skin contaminant, vanc dc'd.   - Dental eval  to rule out infectious etiology that would have led to bacteremia, no significant findings on exam.     # Enterococcus faecalis bacteremia, resolved  - BCx + for enterococcus faecalis x2, Staph epi x1 (likely contaminant)- pan sensitive   - Urine cx  + enterococcus faecalis  - BCx  no growth   - IABP site swapped to RFA   - s/p Vancomycin 1g q12h (-)  - CT A/P negative for infectious pathology    # COVID, resolved  - Off airborne precautions     # Pre-transplant ID w/u   - Trend ID recs for serologies   - Colonoscopy  - normal   - Chest CT  - improved LLL aeration  - s/p immunizations    ==================INFECTIOUS DISEASE================  # Upper Extremity Rash  - Patient developed bilateral upper extremity rash after receiving Hepatitis A & B immunizations on 11/24  - Dermatology consulted  - not likely to be related to vanco, can continue per ID recs  - Recommend clobetasol 0.05% BID to affected areas   - RVP repeated to rule out viral etiology, negative    Patient requires continuous monitoring with bedside rhythm monitoring, pulse ox monitoring, and intermittent blood gas analysis. Care plan discussed with ICU care team. Patient remained critical and at risk for life threatening decompensation.  Patient seen, examined and plan discussed with CCU team during rounds.     I have personally provided 30 minutes of critical care time excluding time spent on separate procedures, in addition to initial critical care time provided by the CICU Attending, Dr. Rowe.    By signing my name below, I, Sonia Preciado, attest that this documentation has been prepared under the direction and in the presence of Kitty Braden NP  Electronically signed: Rosalba Freitas, 12-10-23 @ 20:21    I, Kitty Braden NP, personally performed the services described in this documentation. all medical record entries made by the eileenibmartha were at my direction and in my presence. I have reviewed the chart and agree that the record reflects my personal performance and is accurate and complete  Electronically signed: Kitty Braden NP    ======================= DISPOSITION  =====================  [X] Critical   [ ] Guarded    [ ] Stable    [X] Maintain in CICU  [ ] Downgrade to Telemtry  [ ] Discharge Home    Patient requires continuous monitoring with bedside rhythm monitoring, pulse ox monitoring, and intermittent blood gas analysis. Care plan discussed with ICU care team. Patient remained critical and at risk for life threatening decompensation.  Patient seen, examined and plan discussed with CCU team during rounds.     I have personally provided 30 minutes of critical care time excluding time spent on separate procedures, in addition to initial critical care time provided by the CICU Attending, Dr. Jae Hawthorne [facility] [phone]       DEVORAH VALENCIA  MRN-[id number]  Patient is a 58y old  Male who presents with a chief complaint of Cardiogenic shock (10 Dec 2023 06:45)    HPI: 57yo M w/ hx HTN, CAD w/ 1 stent in , ICH () presenting with abn ekg. Patient presented to [facility] where he was found to have STEMI, recommended to get cath however patient did not want to get it there so it left and came here.  Patient initially had cough, congestion, fever, was placed on antibiotics on .  Started feeling nauseous and had a presyncopal event after which he presented to ED last night.  Had chest pain as well.  Chest pain is midsternal.  Not currently having chest pain.  Received 4 aspirin 30 min pta. (2023 15:11)    24 HOUR EVENTS: no acute events.    REVIEW OF SYSTEMS:  CONSTITUTIONAL: No weakness, fevers or chills  EYES/ENT: No visual changes;  No vertigo or throat pain   NECK: No pain or stiffness  RESPIRATORY: No cough, wheezing, hemoptysis; No shortness of breath  CARDIOVASCULAR: No chest pain or palpitations  GASTROINTESTINAL: No abdominal or epigastric pain. No nausea, vomiting, or hematemesis; No diarrhea or constipation. No melena or hematochezia.  GENITOURINARY: No dysuria, frequency or hematuria  NEUROLOGICAL: No numbness or weakness  SKIN: No itching, rashes      ICU Vital Signs Last 24 Hrs  T(C): 37.1 (10 Dec 2023 19:00), Max: 37.1 (10 Dec 2023 16:00)  T(F): 98.7 (10 Dec 2023 19:00), Max: 98.7 (10 Dec 2023 16:00)  HR: 82 (10 Dec 2023 19:00) (69 - 93)  BP: --  BP(mean): --  ABP: --  ABP(mean): --  RR: 18 (10 Dec 2023 19:00) (14 - 27)  SpO2: 97% (10 Dec 2023 19:00) (97% - 100%)    O2 Parameters below as of 10 Dec 2023 19:00  Patient On (Oxygen Delivery Method): room air      09 Dec 2023 07:01  -  10 Dec 2023 07:00  --------------------------------------------------------  IN: 876 mL / OUT: 1560 mL / NET: -684 mL    10 Dec 2023 07:01  -  10 Dec 2023 20:21  --------------------------------------------------------  IN: 1074 mL / OUT: 850 mL / NET: 224 mL        CAPILLARY BLOOD GLUCOSE    CAPILLARY BLOOD GLUCOSE      POCT Blood Glucose.: 206 mg/dL (10 Dec 2023 16:50)      PHYSICAL EXAM:  Appearance: Normal, NAD  HEAD:  Normocephalic  EYES:  PERRLA, conjunctiva and sclera clear  NECK: Supple, No JVD  CHEST/LUNG: Clear to auscultation bilaterally; No wheezing  HEART: Normal S1, S2. No murmurs, rubs, or gallops  ABDOMEN: + Bowel sounds, Soft, NT, ND   EXTREMITIES:  2+ Peripheral Pulses, No clubbing, cyanosis, or edema  NEUROLOGY: non-focal, aaox3  SKIN: No rashes or lesions  Lines: R femoral IABP clean, dry and intact    ============================I/O===========================   I&O's Detail    09 Dec 2023 07:  -  10 Dec 2023 07:00  --------------------------------------------------------  IN:    Heparin: 336 mL    Oral Fluid: 540 mL  Total IN: 876 mL    OUT:    Voided (mL): 1560 mL  Total OUT: 1560 mL    Total NET: -684 mL      10 Dec 2023 07:01  -  10 Dec 2023 20:21  --------------------------------------------------------  IN:    Heparin: 154 mL    Oral Fluid: 920 mL  Total IN: 1074 mL    OUT:    Voided (mL): 850 mL  Total OUT: 850 mL    Total NET: 224 mL        ============================ LABS =========================                        11.3   9.46  )-----------( 135      ( 10 Dec 2023 17:45 )             34.8     12-10    136  |  106  |  32<H>  ----------------------------<  260<H>  3.7   |  17<L>  |  1.06    Ca    9.4      10 Dec 2023 17:45  Phos  2.4     12-10  Mg     1.8     12-10    TPro  6.4  /  Alb  3.6  /  TBili  0.2  /  DBili  x   /  AST  46<H>  /  ALT  136<H>  /  AlkPhos  66  12-10    LIVER FUNCTIONS - ( 10 Dec 2023 17:45 )  Alb: 3.6 g/dL / Pro: 6.4 g/dL / ALK PHOS: 66 U/L / ALT: 136 U/L / AST: 46 U/L / GGT: x           PT/INR - ( 10 Dec 2023 00:29 )   PT: 11.4 sec;   INR: 1.04 ratio         PTT - ( 10 Dec 2023 00:29 )  PTT:60.1 sec    Lactate, Blood: 1.9 mmol/L (12-10-23 @ 17:45)  Lactate, Blood: 1.3 mmol/L (23 @ 00:59)  Lactate, Blood: 1.5 mmol/L (23 @ 01:22)    Urinalysis Basic - ( 10 Dec 2023 17:45 )    Color: x / Appearance: x / SG: x / pH: x  Gluc: 260 mg/dL / Ketone: x  / Bili: x / Urobili: x   Blood: x / Protein: x / Nitrite: x   Leuk Esterase: x / RBC: x / WBC x   Sq Epi: x / Non Sq Epi: x / Bacteria: x      ======================Micro/Rad/Cardio=================  Telemtry: Reviewed   EKG: Reviewed  CXR: Reviewed  Culture: Reviewed   Echo:   Cath: Cardiac Cath Lab - Adult:   [facility]  Department of Cardiology  [address] [place] [address]  [phone]  Cath Lab Report -- Comprehensive Report  Patient: LD VALENCIA  Study date: 2017  Account number: [id number]  MR number: [id number]  : 1965  Gender: Male  Race: W  Case Physician(s):  Jairon Holguin M.D.  Referring Physician:  Luc Lynn M.D.  INDICATIONS: Unstable angina - CCS4.  HISTORY: The patient has a history of coronary artery disease. The patient  hashypertension and medication-treated dyslipidemia.  PROCEDURE:  --  Left heart catheterization with ventriculography.  --  Left coronary angiography.  --  Right coronary angiography.  TECHNIQUE: The risks and alternatives of the procedures and conscious  sedation were explained to the patient and informed consent was obtained.  Cardiac catheterization performed electively.  Local anesthetic given. Right radial artery access. A 6FR PRELUDE KIT was  inserted in the vessel. Left heart catheterization. Ventriculography was  performed. A 5FR FR4.0 EXPO catheter was utilized. Left coronary artery  angiography. The vessel was injected utilizing a 5FR FL3.5 EXPO catheter.  Right coronary artery angiography. The vessel was injected utilizing a 5FR  FR4.0 EXPO catheter. RADIATION EXPOSURE: 1.1 min.  CONTRAST GIVEN: Omnipaque 55 ml.  MEDICATIONS GIVEN: Midazolam, 1 mg, IV. Fentanyl, 25 mcg, IV. Verapamil  (Isoptin, Calan, Covera), 2.5 mg, IA. Heparin, 3000 units, IA.  VENTRICLES: Global left ventricular function was moderately depressed. EF  estimated was 40 %.  CORONARY VESSELS: The coronary circulation is right dominant.  LM:   --  LM: Normal.  LAD:   --  Proximal LAD: There was a 50 % stenosis.  CX:   --  Circumflex: Normal.  RCA:   --  Mid RCA: There was a 40 % stenosis.  --  Distal RCA: There was a 50 % stenosis.  COMPLICATIONS: There were no complications.  DIAGNOSTIC RECOMMENDATIONS: The patient should continue with the present  medications.  Prepared and signed by  Jairon Holguin M.D.  Signed 2017 12:20:13  HEMODYNAMIC TABLES  Pressures:  Baseline/ Room Air  Pressures:  - HR: 78  Pressures:  - Rhythm:  Pressures:  -- Aortic Pressure (S/D/M): --/--/99  Pressures:  -- Left Ventricle (s/edp): 157/39/--  Outputs:  Baseline/ Room Air  Outputs:  -- CALCULATIONS[age]  Outputs:  -- CALCULATIONS: Body Surface Area: 2.05  Outputs:  -- CALCULATIONS: Height in cm: 175.00  Outputs:  -- CALCULATIONS: Sex: Male  Outputs:  -- CALCULATIONS: Weight in k.40 (17 @ 21:55)    ======================================================  PAST MEDICAL & SURGICAL HISTORY:  HTN (hypertension)      CAD (coronary artery disease)  ; stent      Intracranial hemorrhage        Respiratory arrest        Myocardial infarction, unspecified MI type, unspecified artery      History of coronary artery stent placement      ====================ASSESSMENT ==============  58 male with HTN, CAD (s/p PCI ), HFrEF, CVA , and T2DM presenting with chest pressure and unknown tachycardia that was shocked x1, [facility]  found to have in-stent restenosis of pLAD and  of RCA with elevated RA and PA pressures and severely decreased. Admitted to CICU for management of cardiogenic shock and ADHF requiring IABP  -, with hospital course c/b vfib arrest requiring reinsertion of IABP.     ====================== NEUROLOGY=====================  Anxiety  - No Seroquel/antipsychotics since vfib arrest and prolonged QTC  - Psych Eval, recommended SSRI, but pt. refused   - Psych recommending atarax PRN  - Continue to monitor mental status    PT/Conditioning  - Continue band exercised while on bedrest s/t IABP    ==================== RESPIRATORY======================  Acute Hypoxemic Respiratory Failure  - s/p x2 intubations for cardiogenic pulm edema and the in setting of cardiac arrest, resolved - extubated 11/10  - Currently maintaining >95% sats on room air  - Continue incentive spirometry and monitoring of sp02    Asthma  - c/w albuterol, symbicort and spiriva  - On trelegy at home  - Continue to monitor SpO2 with goal >94%    ====================CARDIOVASCULAR==================  Vfib arrest i/s/o ischemia  - Lido gtt off   - PO Amio load - total of 5g per EP complete , continue PO amio  - Amio held for rising LFTs, continue to hold  - Keep K > 4, Mag > 2.2     Cardiogenic shock requiring IABP (- , -)  - Likely 2/2 NSTEMI and ADHF  -  LHC: pLAD 100 % in-stent restenosis & mRCA, 100 %. PCWP 30. IABP placed.  -  TTE: LV dilated. EF 32 %. Regional WMAs present, mod (grade 2) LV diastolic dysfunction  -  TTE: EF 22% and + LV thrombus  - IABP swapped  to RFA, continue 1:1 support  - Off Milrinone gtt @ 7:30 am   - [place] d/c'd due to elevated K levels  - c/w coreg 25 BID for GDMT  - HIT and HAIDER sent (12/3) as per HF   - UNOS status 2 as of   -  - reduced hydralazine 100 TID to 75 TID and ISD 40 TID to 30 TID for AL reduction    NSTEMI iso stent re-occlusion of pLAD and 100%  of RCA  - EKG on admission w/ LBBB  - DAPT: c/w ASA, Brilinta d/c'd per transplant w/u  - c/w lipitor 80  - cMR deferred given necessity of IABP  - CT sx not recommending CABG, undergoing AT eval    LV thrombus  - c/w heparin gtt w/ therapeutic PTT    ===================== RENAL =========================  Non-oliguric ARIC, resolved   - Baseline Cr: -  - Renal US: no evidence of renal artery stenosis  - Trend BMP, lytes daily, replace as needed  - Continue Strict I/Os, avoid nephrotoxins    Mild Hyponatremia/Hyperkalemia iso ARIC  - Continue fluid restriction   - Urea powder d/c'd per renal  - Trend daily  - Continue monitoring urine output, lytes, SCr/ BUN  - Replete lytes prn with goal K >4 and Mg >2    =============== GASTROINTESTINAL===================  Constipation/ileus, resolved  - regular diet  - bowel reg prn    ===================ENDO====================  Type 2 DM  - A1c 8.3   - Continue lantus, premeal, low ISS  - continue FS    ===================HEMATOLOGIC/ONC ===================  - H/H & plts stable  - Monitor H/H and plts  - VTE PPX: heparin gtt    ==================INFECTIOUS DISEASE================  # Positive blood culture, resolved  - Repeat BCx drawn  for leukocytosis to 12k - positive on , G+ rods in anaerobic bottle, suspect contaminant  - Repeat cultures x2 sent  per transplant ID recs - no growth to date  - Vancomycin by trough (-)  - Final microbial ID: Cutibacterium acnes, per ID this is likely to be a skin contaminant, vanc dc'd.   - Dental eval  to rule out infectious etiology that would have led to bacteremia, no significant findings on exam.     # Enterococcus faecalis bacteremia, resolved  - BCx + for enterococcus faecalis x2, Staph epi x1 (likely contaminant)- pan sensitive   - Urine cx  + enterococcus faecalis  - BCx  no growth   - IABP site swapped to RFA   - s/p Vancomycin 1g q12h (-)  - CT A/P negative for infectious pathology    # COVID, resolved  - Off airborne precautions     # Pre-transplant ID w/u   - Trend ID recs for serologies   - Colonoscopy  - normal   - Chest CT  - improved LLL aeration  - s/p immunizations    ==================INFECTIOUS DISEASE================  # Upper Extremity Rash  - Patient developed bilateral upper extremity rash after receiving Hepatitis A & B immunizations on   - Dermatology consulted  - not likely to be related to vanco, can continue per ID recs  - Recommend clobetasol 0.05% BID to affected areas   - RVP repeated to rule out viral etiology, negative    Patient requires continuous monitoring with bedside rhythm monitoring, pulse ox monitoring, and intermittent blood gas analysis. Care plan discussed with ICU care team. Patient remained critical and at risk for life threatening decompensation.  Patient seen, examined and plan discussed with CCU team during rounds.     I have personally provided 30 minutes of critical care time excluding time spent on separate procedures, in addition to initial critical care time provided by the CICU Attending, Dr. Rowe.    By signing my name below, I, Sonia Preciado, attest that this documentation has been prepared under the direction and in the presence of Kitty Braden NP  Electronically signed: Rosalba Freitas, 12-10-23 @ 20:21    I, Kitty Braden NP, personally performed the services described in this documentation. all medical record entries made by the eileenibmartha were at my direction and in my presence. I have reviewed the chart and agree that the record reflects my personal performance and is accurate and complete  Electronically signed: Kitty Braden NP    ======================= DISPOSITION  =====================  [X] Critical   [ ] Guarded    [ ] Stable    [X] Maintain in CICU  [ ] Downgrade to Telemtry  [ ] Discharge Home    Patient requires continuous monitoring with bedside rhythm monitoring, pulse ox monitoring, and intermittent blood gas analysis. Care plan discussed with ICU care team. Patient remained critical and at risk for life threatening decompensation.  Patient seen, examined and plan discussed with CCU team during rounds.     I have personally provided 30 minutes of critical care time excluding time spent on separate procedures, in addition to initial critical care time provided by the CICU Attending, Dr. [facility]ae [facility] [phone]       DEVORAH VALENCIA  MRN-[id number]  Patient is a 58y old  Male who presents with a chief complaint of Cardiogenic shock (10 Dec 2023 06:45)    HPI: 59yo M w/ hx HTN, CAD w/ 1 stent in , ICH () presenting with abn ekg. Patient presented to [facility] where he was found to have STEMI, recommended to get cath however patient did not want to get it there so it left and came here.  Patient initially had cough, congestion, fever, was placed on antibiotics on .  Started feeling nauseous and had a presyncopal event after which he presented to ED last night.  Had chest pain as well.  Chest pain is midsternal.  Not currently having chest pain.  Received 4 aspirin 30 min pta. (2023 15:11)    24 HOUR EVENTS: no acute events.    REVIEW OF SYSTEMS:  CONSTITUTIONAL: No weakness, fevers or chills  EYES/ENT: No visual changes;  No vertigo or throat pain   NECK: No pain or stiffness  RESPIRATORY: No cough, wheezing, hemoptysis; No shortness of breath  CARDIOVASCULAR: No chest pain or palpitations  GASTROINTESTINAL: No abdominal or epigastric pain. No nausea, vomiting, or hematemesis; No diarrhea or constipation. No melena or hematochezia.  GENITOURINARY: No dysuria, frequency or hematuria  NEUROLOGICAL: No numbness or weakness  SKIN: No itching, rashes      ICU Vital Signs Last 24 Hrs  T(C): 37.1 (10 Dec 2023 19:00), Max: 37.1 (10 Dec 2023 16:00)  T(F): 98.7 (10 Dec 2023 19:00), Max: 98.7 (10 Dec 2023 16:00)  HR: 82 (10 Dec 2023 19:00) (69 - 93)  BP: --  BP(mean): --  ABP: --  ABP(mean): --  RR: 18 (10 Dec 2023 19:00) (14 - 27)  SpO2: 97% (10 Dec 2023 19:00) (97% - 100%)    O2 Parameters below as of 10 Dec 2023 19:00  Patient On (Oxygen Delivery Method): room air      09 Dec 2023 07:01  -  10 Dec 2023 07:00  --------------------------------------------------------  IN: 876 mL / OUT: 1560 mL / NET: -684 mL    10 Dec 2023 07:01  -  10 Dec 2023 20:21  --------------------------------------------------------  IN: 1074 mL / OUT: 850 mL / NET: 224 mL        CAPILLARY BLOOD GLUCOSE    CAPILLARY BLOOD GLUCOSE      POCT Blood Glucose.: 206 mg/dL (10 Dec 2023 16:50)      PHYSICAL EXAM:  Appearance: Normal, NAD  HEAD:  Normocephalic  EYES:  PERRLA, conjunctiva and sclera clear  NECK: Supple, No JVD  CHEST/LUNG: Clear to auscultation bilaterally; No wheezing  HEART: Normal S1, S2. No murmurs, rubs, or gallops  ABDOMEN: + Bowel sounds, Soft, NT, ND   EXTREMITIES:  2+ Peripheral Pulses, No clubbing, cyanosis, or edema  NEUROLOGY: non-focal, aaox3  SKIN: No rashes or lesions  Lines: R femoral IABP clean, dry and intact    ============================I/O===========================   I&O's Detail    09 Dec 2023 07:  -  10 Dec 2023 07:00  --------------------------------------------------------  IN:    Heparin: 336 mL    Oral Fluid: 540 mL  Total IN: 876 mL    OUT:    Voided (mL): 1560 mL  Total OUT: 1560 mL    Total NET: -684 mL      10 Dec 2023 07:01  -  10 Dec 2023 20:21  --------------------------------------------------------  IN:    Heparin: 154 mL    Oral Fluid: 920 mL  Total IN: 1074 mL    OUT:    Voided (mL): 850 mL  Total OUT: 850 mL    Total NET: 224 mL        ============================ LABS =========================                        11.3   9.46  )-----------( 135      ( 10 Dec 2023 17:45 )             34.8     12-10    136  |  106  |  32<H>  ----------------------------<  260<H>  3.7   |  17<L>  |  1.06    Ca    9.4      10 Dec 2023 17:45  Phos  2.4     12-10  Mg     1.8     12-10    TPro  6.4  /  Alb  3.6  /  TBili  0.2  /  DBili  x   /  AST  46<H>  /  ALT  136<H>  /  AlkPhos  66  12-10    LIVER FUNCTIONS - ( 10 Dec 2023 17:45 )  Alb: 3.6 g/dL / Pro: 6.4 g/dL / ALK PHOS: 66 U/L / ALT: 136 U/L / AST: 46 U/L / GGT: x           PT/INR - ( 10 Dec 2023 00:29 )   PT: 11.4 sec;   INR: 1.04 ratio         PTT - ( 10 Dec 2023 00:29 )  PTT:60.1 sec    Lactate, Blood: 1.9 mmol/L (12-10-23 @ 17:45)  Lactate, Blood: 1.3 mmol/L (23 @ 00:59)  Lactate, Blood: 1.5 mmol/L (23 @ 01:22)    Urinalysis Basic - ( 10 Dec 2023 17:45 )    Color: x / Appearance: x / SG: x / pH: x  Gluc: 260 mg/dL / Ketone: x  / Bili: x / Urobili: x   Blood: x / Protein: x / Nitrite: x   Leuk Esterase: x / RBC: x / WBC x   Sq Epi: x / Non Sq Epi: x / Bacteria: x      ======================Micro/Rad/Cardio=================  Telemtry: Reviewed   EKG: Reviewed  CXR: Reviewed  Culture: Reviewed   Echo:   Cath: Cardiac Cath Lab - Adult:   [facility]  Department of Cardiology  [address] [place] [address]  [phone]  Cath Lab Report -- Comprehensive Report  Patient: LD VALENCIA  Study date: 2017  Account number: [id number]  MR number: [id number]  : 1965  Gender: Male  Race: W  Case Physician(s):  Jairon Holguin M.D.  Referring Physician:  Luc Lynn M.D.  INDICATIONS: Unstable angina - CCS4.  HISTORY: The patient has a history of coronary artery disease. The patient  hashypertension and medication-treated dyslipidemia.  PROCEDURE:  --  Left heart catheterization with ventriculography.  --  Left coronary angiography.  --  Right coronary angiography.  TECHNIQUE: The risks and alternatives of the procedures and conscious  sedation were explained to the patient and informed consent was obtained.  Cardiac catheterization performed electively.  Local anesthetic given. Right radial artery access. A 6FR PRELUDE KIT was  inserted in the vessel. Left heart catheterization. Ventriculography was  performed. A 5FR FR4.0 EXPO catheter was utilized. Left coronary artery  angiography. The vessel was injected utilizing a 5FR FL3.5 EXPO catheter.  Right coronary artery angiography. The vessel was injected utilizing a 5FR  FR4.0 EXPO catheter. RADIATION EXPOSURE: 1.1 min.  CONTRAST GIVEN: Omnipaque 55 ml.  MEDICATIONS GIVEN: Midazolam, 1 mg, IV. Fentanyl, 25 mcg, IV. Verapamil  (Isoptin, Calan, Covera), 2.5 mg, IA. Heparin, 3000 units, IA.  VENTRICLES: Global left ventricular function was moderately depressed. EF  estimated was 40 %.  CORONARY VESSELS: The coronary circulation is right dominant.  LM:   --  LM: Normal.  LAD:   --  Proximal LAD: There was a 50 % stenosis.  CX:   --  Circumflex: Normal.  RCA:   --  Mid RCA: There was a 40 % stenosis.  --  Distal RCA: There was a 50 % stenosis.  COMPLICATIONS: There were no complications.  DIAGNOSTIC RECOMMENDATIONS: The patient should continue with the present  medications.  Prepared and signed by  Jairon Holguin M.D.  Signed 2017 12:20:13  HEMODYNAMIC TABLES  Pressures:  Baseline/ Room Air  Pressures:  - HR: 78  Pressures:  - Rhythm:  Pressures:  -- Aortic Pressure (S/D/M): --/--/99  Pressures:  -- Left Ventricle (s/edp): 157/39/--  Outputs:  Baseline/ Room Air  Outputs:  -- CALCULATIONS[age]  Outputs:  -- CALCULATIONS: Body Surface Area: 2.05  Outputs:  -- CALCULATIONS: Height in cm: 175.00  Outputs:  -- CALCULATIONS: Sex: Male  Outputs:  -- CALCULATIONS: Weight in k.40 (17 @ 21:55)    ======================================================  PAST MEDICAL & SURGICAL HISTORY:  HTN (hypertension)      CAD (coronary artery disease)  ; stent      Intracranial hemorrhage        Respiratory arrest        Myocardial infarction, unspecified MI type, unspecified artery      History of coronary artery stent placement      ====================ASSESSMENT ==============  58 male with HTN, CAD (s/p PCI ), HFrEF, CVA , and T2DM presenting with chest pressure and unknown tachycardia that was shocked x1, [facility]  found to have in-stent restenosis of pLAD and  of RCA with elevated RA and PA pressures and severely decreased. Admitted to CICU for management of cardiogenic shock and ADHF requiring IABP  -, with hospital course c/b vfib arrest requiring reinsertion of IABP.     ====================== NEUROLOGY=====================  Anxiety  - No Seroquel/antipsychotics since vfib arrest and prolonged QTC  - Psych Eval, recommended SSRI, but pt. refused   - Psych recommending atarax PRN  - Continue to monitor mental status    PT/Conditioning  - Continue band exercised while on bedrest s/t IABP    ==================== RESPIRATORY======================  Acute Hypoxemic Respiratory Failure  - s/p x2 intubations for cardiogenic pulm edema and the in setting of cardiac arrest, resolved - extubated 11/10  - Currently maintaining >95% sats on room air  - Continue incentive spirometry and monitoring of sp02    Asthma  - c/w albuterol, symbicort and spiriva  - On trelegy at home  - Continue to monitor SpO2 with goal >94%    ====================CARDIOVASCULAR==================  Vfib arrest i/s/o ischemia  - Lido gtt off   - PO Amio load - total of 5g per EP complete , continue PO amio  - Amio held for rising LFTs, continue to hold  - Keep K > 4, Mag > 2.2     Cardiogenic shock requiring IABP (- , -)  - Likely 2/2 NSTEMI and ADHF  -  LHC: pLAD 100 % in-stent restenosis & mRCA, 100 %. PCWP 30. IABP placed.  -  TTE: LV dilated. EF 32 %. Regional WMAs present, mod (grade 2) LV diastolic dysfunction  -  TTE: EF 22% and + LV thrombus  - IABP swapped  to RFA, continue 1:1 support  - Off Milrinone gtt @ 7:30 am   - [place] d/c'd due to elevated K levels  - c/w coreg 25 BID for GDMT  - HIT and HAIDER sent (12/3) as per HF   - UNOS status 2 as of   -  - reduced hydralazine 100 TID to 75 TID and ISD 40 TID to 30 TID for AL reduction    NSTEMI iso stent re-occlusion of pLAD and 100%  of RCA  - EKG on admission w/ LBBB  - DAPT: c/w ASA, Brilinta d/c'd per transplant w/u  - c/w lipitor 80  - cMR deferred given necessity of IABP  - CT sx not recommending CABG, undergoing AT eval    LV thrombus  - c/w heparin gtt w/ therapeutic PTT    ===================== RENAL =========================  Non-oliguric ARIC, resolved   - Baseline Cr: -  - Renal US: no evidence of renal artery stenosis  - Trend BMP, lytes daily, replace as needed  - Continue Strict I/Os, avoid nephrotoxins    Mild Hyponatremia/Hyperkalemia iso ARIC  - Continue fluid restriction   - Urea powder d/c'd per renal  - Trend daily  - Continue monitoring urine output, lytes, SCr/ BUN  - Replete lytes prn with goal K >4 and Mg >2    =============== GASTROINTESTINAL===================  Constipation/ileus, resolved  - regular diet  - bowel reg prn    ===================ENDO====================  Type 2 DM  - A1c 8.3   - Continue lantus, premeal, low ISS  - continue FS    ===================HEMATOLOGIC/ONC ===================  - H/H & plts stable  - Monitor H/H and plts  - VTE PPX: heparin gtt    ==================INFECTIOUS DISEASE================  # Positive blood culture, resolved  - Repeat BCx drawn  for leukocytosis to 12k - positive on , G+ rods in anaerobic bottle, suspect contaminant  - Repeat cultures x2 sent  per transplant ID recs - no growth to date  - Vancomycin by trough (-)  - Final microbial ID: Cutibacterium acnes, per ID this is likely to be a skin contaminant, vanc dc'd.   - Dental eval  to rule out infectious etiology that would have led to bacteremia, no significant findings on exam.     # Enterococcus faecalis bacteremia, resolved  - BCx + for enterococcus faecalis x2, Staph epi x1 (likely contaminant)- pan sensitive   - Urine cx  + enterococcus faecalis  - BCx  no growth   - IABP site swapped to RFA   - s/p Vancomycin 1g q12h (-)  - CT A/P negative for infectious pathology    # COVID, resolved  - Off airborne precautions     # Pre-transplant ID w/u   - Trend ID recs for serologies   - Colonoscopy  - normal   - Chest CT  - improved LLL aeration  - s/p immunizations    ==================INFECTIOUS DISEASE================  # Upper Extremity Rash  - Patient developed bilateral upper extremity rash after receiving Hepatitis A & B immunizations on   - Dermatology consulted  - not likely to be related to vanco, can continue per ID recs  - Recommend clobetasol 0.05% BID to affected areas   - RVP repeated to rule out viral etiology, negative    Patient requires continuous monitoring with bedside rhythm monitoring, pulse ox monitoring, and intermittent blood gas analysis. Care plan discussed with ICU care team. Patient remained critical and at risk for life threatening decompensation.  Patient seen, examined and plan discussed with CCU team during rounds.     I have personally provided 30 minutes of critical care time excluding time spent on separate procedures, in addition to initial critical care time provided by the CICU Attending, Dr. Rowe.    By signing my name below, I, Sonia Preciado, attest that this documentation has been prepared under the direction and in the presence of Kitty Braden NP  Electronically signed: Rosalba Freitas, 12-10-23 @ 20:21    I, Kitty Braden NP, personally performed the services described in this documentation. all medical record entries made by the eileenibmartha were at my direction and in my presence. I have reviewed the chart and agree that the record reflects my personal performance and is accurate and complete  Electronically signed: Kitty Braden NP    ======================= DISPOSITION  =====================  [X] Critical   [ ] Guarded    [ ] Stable    [X] Maintain in CICU  [ ] Downgrade to Telemtry  [ ] Discharge Home    Patient requires continuous monitoring with bedside rhythm monitoring, pulse ox monitoring, and intermittent blood gas analysis. Care plan discussed with ICU care team. Patient remained critical and at risk for life threatening decompensation.  Patient seen, examined and plan discussed with CCU team during rounds.     I have personally provided 30 minutes of critical care time excluding time spent on separate procedures, in addition to initial critical care time provided by the CICU Attending, Dr. Jae [facility]thorne [facility] [phone]       DEVORAH VALENCIA  MRN-[id number]  Patient is a 58y old  Male who presents with a chief complaint of Cardiogenic shock (10 Dec 2023 06:45)    HPI: 57yo M w/ hx HTN, CAD w/ 1 stent in , ICH () presenting with abn ekg. Patient presented to [facility] where he was found to have STEMI, recommended to get cath however patient did not want to get it there so it left and came here.  Patient initially had cough, congestion, fever, was placed on antibiotics on .  Started feeling nauseous and had a presyncopal event after which he presented to ED last night.  Had chest pain as well.  Chest pain is midsternal.  Not currently having chest pain.  Received 4 aspirin 30 min pta. (2023 15:11)    24 HOUR EVENTS: no acute events.    REVIEW OF SYSTEMS:  CONSTITUTIONAL: No weakness, fevers or chills  EYES/ENT: No visual changes;  No vertigo or throat pain   NECK: No pain or stiffness  RESPIRATORY: No cough, wheezing, hemoptysis; No shortness of breath  CARDIOVASCULAR: No chest pain or palpitations  GASTROINTESTINAL: No abdominal or epigastric pain. No nausea, vomiting, or hematemesis; No diarrhea or constipation. No melena or hematochezia.  GENITOURINARY: No dysuria, frequency or hematuria  NEUROLOGICAL: No numbness or weakness  SKIN: No itching, rashes      ICU Vital Signs Last 24 Hrs  T(C): 37.1 (10 Dec 2023 19:00), Max: 37.1 (10 Dec 2023 16:00)  T(F): 98.7 (10 Dec 2023 19:00), Max: 98.7 (10 Dec 2023 16:00)  HR: 82 (10 Dec 2023 19:00) (69 - 93)  BP: --  BP(mean): --  ABP: --  ABP(mean): --  RR: 18 (10 Dec 2023 19:00) (14 - 27)  SpO2: 97% (10 Dec 2023 19:00) (97% - 100%)    O2 Parameters below as of 10 Dec 2023 19:00  Patient On (Oxygen Delivery Method): room air      09 Dec 2023 07:01  -  10 Dec 2023 07:00  --------------------------------------------------------  IN: 876 mL / OUT: 1560 mL / NET: -684 mL    10 Dec 2023 07:01  -  10 Dec 2023 20:21  --------------------------------------------------------  IN: 1074 mL / OUT: 850 mL / NET: 224 mL        CAPILLARY BLOOD GLUCOSE    CAPILLARY BLOOD GLUCOSE      POCT Blood Glucose.: 206 mg/dL (10 Dec 2023 16:50)      PHYSICAL EXAM:  Appearance: Normal, NAD  HEAD:  Normocephalic  EYES:  PERRLA, conjunctiva and sclera clear  NECK: Supple, No JVD  CHEST/LUNG: Clear to auscultation bilaterally; No wheezing  HEART: Normal S1, S2. No murmurs, rubs, or gallops  ABDOMEN: + Bowel sounds, Soft, NT, ND   EXTREMITIES:  2+ Peripheral Pulses, No clubbing, cyanosis, or edema  NEUROLOGY: non-focal, aaox3  SKIN: No rashes or lesions  Lines: R femoral IABP clean, dry and intact    ============================I/O===========================   I&O's Detail    09 Dec 2023 07:  -  10 Dec 2023 07:00  --------------------------------------------------------  IN:    Heparin: 336 mL    Oral Fluid: 540 mL  Total IN: 876 mL    OUT:    Voided (mL): 1560 mL  Total OUT: 1560 mL    Total NET: -684 mL      10 Dec 2023 07:01  -  10 Dec 2023 20:21  --------------------------------------------------------  IN:    Heparin: 154 mL    Oral Fluid: 920 mL  Total IN: 1074 mL    OUT:    Voided (mL): 850 mL  Total OUT: 850 mL    Total NET: 224 mL        ============================ LABS =========================                        11.3   9.46  )-----------( 135      ( 10 Dec 2023 17:45 )             34.8     12-10    136  |  106  |  32<H>  ----------------------------<  260<H>  3.7   |  17<L>  |  1.06    Ca    9.4      10 Dec 2023 17:45  Phos  2.4     12-10  Mg     1.8     12-10    TPro  6.4  /  Alb  3.6  /  TBili  0.2  /  DBili  x   /  AST  46<H>  /  ALT  136<H>  /  AlkPhos  66  12-10    LIVER FUNCTIONS - ( 10 Dec 2023 17:45 )  Alb: 3.6 g/dL / Pro: 6.4 g/dL / ALK PHOS: 66 U/L / ALT: 136 U/L / AST: 46 U/L / GGT: x           PT/INR - ( 10 Dec 2023 00:29 )   PT: 11.4 sec;   INR: 1.04 ratio         PTT - ( 10 Dec 2023 00:29 )  PTT:60.1 sec    Lactate, Blood: 1.9 mmol/L (12-10-23 @ 17:45)  Lactate, Blood: 1.3 mmol/L (23 @ 00:59)  Lactate, Blood: 1.5 mmol/L (23 @ 01:22)    Urinalysis Basic - ( 10 Dec 2023 17:45 )    Color: x / Appearance: x / SG: x / pH: x  Gluc: 260 mg/dL / Ketone: x  / Bili: x / Urobili: x   Blood: x / Protein: x / Nitrite: x   Leuk Esterase: x / RBC: x / WBC x   Sq Epi: x / Non Sq Epi: x / Bacteria: x      ======================Micro/Rad/Cardio=================  Telemtry: Reviewed   EKG: Reviewed  CXR: Reviewed  Culture: Reviewed   Echo:   Cath: Cardiac Cath Lab - Adult:   [facility]  Department of Cardiology  [address] [place] [address]  [phone]  Cath Lab Report -- Comprehensive Report  Patient: LD VALENCIA  Study date: 2017  Account number: [id number]  MR number: [id number]  : 1965  Gender: Male  Race: W  Case Physician(s):  Jairon Holguin M.D.  Referring Physician:  Luc Lynn M.D.  INDICATIONS: Unstable angina - CCS4.  HISTORY: The patient has a history of coronary artery disease. The patient  hashypertension and medication-treated dyslipidemia.  PROCEDURE:  --  Left heart catheterization with ventriculography.  --  Left coronary angiography.  --  Right coronary angiography.  TECHNIQUE: The risks and alternatives of the procedures and conscious  sedation were explained to the patient and informed consent was obtained.  Cardiac catheterization performed electively.  Local anesthetic given. Right radial artery access. A 6FR PRELUDE KIT was  inserted in the vessel. Left heart catheterization. Ventriculography was  performed. A 5FR FR4.0 EXPO catheter was utilized. Left coronary artery  angiography. The vessel was injected utilizing a 5FR FL3.5 EXPO catheter.  Right coronary artery angiography. The vessel was injected utilizing a 5FR  FR4.0 EXPO catheter. RADIATION EXPOSURE: 1.1 min.  CONTRAST GIVEN: Omnipaque 55 ml.  MEDICATIONS GIVEN: Midazolam, 1 mg, IV. Fentanyl, 25 mcg, IV. Verapamil  (Isoptin, Calan, Covera), 2.5 mg, IA. Heparin, 3000 units, IA.  VENTRICLES: Global left ventricular function was moderately depressed. EF  estimated was 40 %.  CORONARY VESSELS: The coronary circulation is right dominant.  LM:   --  LM: Normal.  LAD:   --  Proximal LAD: There was a 50 % stenosis.  CX:   --  Circumflex: Normal.  RCA:   --  Mid RCA: There was a 40 % stenosis.  --  Distal RCA: There was a 50 % stenosis.  COMPLICATIONS: There were no complications.  DIAGNOSTIC RECOMMENDATIONS: The patient should continue with the present  medications.  Prepared and signed by  Jairon Holguin M.D.  Signed 2017 12:20:13  HEMODYNAMIC TABLES  Pressures:  Baseline/ Room Air  Pressures:  - HR: 78  Pressures:  - Rhythm:  Pressures:  -- Aortic Pressure (S/D/M): --/--/99  Pressures:  -- Left Ventricle (s/edp): 157/39/--  Outputs:  Baseline/ Room Air  Outputs:  -- CALCULATIONS[age]  Outputs:  -- CALCULATIONS: Body Surface Area: 2.05  Outputs:  -- CALCULATIONS: Height in cm: 175.00  Outputs:  -- CALCULATIONS: Sex: Male  Outputs:  -- CALCULATIONS: Weight in k.40 (17 @ 21:55)    ======================================================  PAST MEDICAL & SURGICAL HISTORY:  HTN (hypertension)      CAD (coronary artery disease)  ; stent      Intracranial hemorrhage        Respiratory arrest        Myocardial infarction, unspecified MI type, unspecified artery      History of coronary artery stent placement      ====================ASSESSMENT ==============  58 male with HTN, CAD (s/p PCI ), HFrEF, CVA , and T2DM presenting with chest pressure and unknown tachycardia that was shocked x1, [facility]  found to have in-stent restenosis of pLAD and  of RCA with elevated RA and PA pressures and severely decreased. Admitted to CICU for management of cardiogenic shock and ADHF requiring IABP  -, with hospital course c/b vfib arrest requiring reinsertion of IABP.     ====================== NEUROLOGY=====================  Anxiety  - No Seroquel/antipsychotics since vfib arrest and prolonged QTC  - Psych Eval, recommended SSRI, but pt. refused   - Psych recommending atarax PRN  - Continue to monitor mental status    PT/Conditioning  - Continue band exercised while on bedrest s/t IABP    ==================== RESPIRATORY======================  Acute Hypoxemic Respiratory Failure  - s/p x2 intubations for cardiogenic pulm edema and the in setting of cardiac arrest, resolved - extubated 11/10  - Currently maintaining >95% sats on room air  - Continue incentive spirometry and monitoring of sp02    Asthma  - c/w albuterol, symbicort and spiriva  - On trelegy at home  - Continue to monitor SpO2 with goal >94%    ====================CARDIOVASCULAR==================  Vfib arrest i/s/o ischemia  - Lido gtt off   - PO Amio load - total of 5g per EP complete   - Amio held for rising LFTs, continue to hold  - Keep K > 4, Mag > 2.2     Cardiogenic shock requiring IABP (- , -)  - Likely 2/2 NSTEMI and ADHF  -  LHC: pLAD 100 % in-stent restenosis & mRCA, 100 %. PCWP 30. IABP placed.  -  TTE: LV dilated. EF 32 %. Regional WMAs present, mod (grade 2) LV diastolic dysfunction  -  TTE: EF 22% and + LV thrombus  - IABP swapped  to RFA, continue 1:1 support  - Off Milrinone gtt @ 7:30 am   - [place] d/c'd due to elevated K levels  - continue hydralazine and isordil for a/l reduction  - c/w coreg 25 BID for GDMT  - UNOS status 2 as of       NSTEMI iso stent re-occlusion of pLAD and 100%  of RCA  - EKG on admission w/ LBBB  - DAPT: c/w ASA, Brilinta d/c'd per transplant w/u  - c/w lipitor 80  - cMR deferred given necessity of IABP  - CT sx not recommending CABG, undergoing AT eval    LV thrombus  - c/w heparin gtt w/ therapeutic PTT    ===================== RENAL =========================  Non-oliguric ARIC, resolved   - Baseline Cr: 1-  - Renal US: no evidence of renal artery stenosis  - Trend BMP, lytes daily, replace as needed  - Continue Strict I/Os, avoid nephrotoxins    Mild Hyponatremia/Hyperkalemia iso ARIC  - improved, Na 136 this am  - Continue monitoring urine output, lytes, SCr/ BUN  - Replete lytes prn with goal K >4 and Mg >2    =============== GASTROINTESTINAL===================  Constipation/ileus, resolved  - regular diet  - bowel reg prn    ===================ENDO====================  Type 2 DM  - A1c 8.3   - Continue lantus, premeal, low ISS  - continue FS    ===================HEMATOLOGIC/ONC ===================  - H/H & plts stable  - Monitor H/H and plts  - VTE PPX: heparin gtt    ==================INFECTIOUS DISEASE================  # Positive blood culture, resolved  - Repeat BCx drawn  for leukocytosis to 12k - positive on , G+ rods in anaerobic bottle, suspect contaminant  - Repeat cultures x2 sent  per transplant ID recs - no growth to date  - Vancomycin by trough (-)  - Final microbial ID: Cutibacterium acnes, per ID this is likely to be a skin contaminant, vanc dc'd.   - Dental eval  to rule out infectious etiology that would have led to bacteremia, no significant findings on exam.     # Enterococcus faecalis bacteremia, resolved  - BCx + for enterococcus faecalis x2, Staph epi x1 (likely contaminant)- pan sensitive   - Urine cx  + enterococcus faecalis  - BCx  no growth   - IABP site swapped to RFA   - s/p Vancomycin 1g q12h (-)  - CT A/P negative for infectious pathology    # COVID, resolved  - Off airborne precautions     # Pre-transplant ID w/u   - Trend ID recs for serologies   - Colonoscopy  - normal   - Chest CT  - improved LLL aeration  - s/p immunizations    ==================INFECTIOUS DISEASE================  # Upper Extremity Rash  - Patient developed bilateral upper extremity rash after receiving Hepatitis A & B immunizations on   - Dermatology consulted  - not likely to be related to vanco, can continue per ID recs  - Recommend clobetasol 0.05% BID to affected areas   - RVP repeated to rule out viral etiology, negative    Patient requires continuous monitoring with bedside rhythm monitoring, pulse ox monitoring, and intermittent blood gas analysis. Care plan discussed with ICU care team. Patient remained critical and at risk for life threatening decompensation.  Patient seen, examined and plan discussed with CCU team during rounds.     I have personally provided 30 minutes of critical care time excluding time spent on separate procedures, in addition to initial critical care time provided by the CICU Attending, Dr. Rowe.    By signing my name below, I, Sonia Preciado, attest that this documentation has been prepared under the direction and in the presence of Kitty Braden NP  Electronically signed: Rosalba Freitas, 12-10-23 @ 20:21    I, Kitty Braden NP, personally performed the services described in this documentation. all medical record entries made by the eileenibmartha were at my direction and in my presence. I have reviewed the chart and agree that the record reflects my personal performance and is accurate and complete  Electronically signed: Kitty Braden NP    ======================= DISPOSITION  =====================  [X] Critical   [ ] Guarded    [ ] Stable    [X] Maintain in CICU  [ ] Downgrade to Telemtry  [ ] Discharge Home    Patient requires continuous monitoring with bedside rhythm monitoring, pulse ox monitoring, and intermittent blood gas analysis. Care plan discussed with ICU care team. Patient remained critical and at risk for life threatening decompensation.  Patient seen, examined and plan discussed with CCU team during rounds.     I have personally provided 30 minutes of critical care time excluding time spent on separate procedures, in addition to initial critical care time provided by the CICU Attending, Dr. Jae Hawthorne [facility]  CICU [id number]       DEVORAH VALENCIA  MRN-[id number]  Patient is a 58y old  Male who presents with a chief complaint of Cardiogenic shock (10 Dec 2023 06:45)    HPI: 59yo M w/ hx HTN, CAD w/ 1 stent in , ICH () presenting with abn ekg. Patient presented to [facility] where he was found to have STEMI, recommended to get cath however patient did not want to get it there so it left and came here.  Patient initially had cough, congestion, fever, was placed on antibiotics on .  Started feeling nauseous and had a presyncopal event after which he presented to ED last night.  Had chest pain as well.  Chest pain is midsternal.  Not currently having chest pain.  Received 4 aspirin 30 min pta. (2023 15:11)    24 HOUR EVENTS: no acute events.    REVIEW OF SYSTEMS:  CONSTITUTIONAL: No weakness, fevers or chills  EYES/ENT: No visual changes;  No vertigo or throat pain   NECK: No pain or stiffness  RESPIRATORY: No cough, wheezing, hemoptysis; No shortness of breath  CARDIOVASCULAR: No chest pain or palpitations  GASTROINTESTINAL: No abdominal or epigastric pain. No nausea, vomiting, or hematemesis; No diarrhea or constipation. No melena or hematochezia.  GENITOURINARY: No dysuria, frequency or hematuria  NEUROLOGICAL: No numbness or weakness  SKIN: No itching, rashes      ICU Vital Signs Last 24 Hrs  T(C): 37.1 (10 Dec 2023 19:00), Max: 37.1 (10 Dec 2023 16:00)  T(F): 98.7 (10 Dec 2023 19:00), Max: 98.7 (10 Dec 2023 16:00)  HR: 82 (10 Dec 2023 19:00) (69 - 93)  BP: --  BP(mean): --  ABP: --  ABP(mean): --  RR: 18 (10 Dec 2023 19:00) (14 - 27)  SpO2: 97% (10 Dec 2023 19:00) (97% - 100%)    O2 Parameters below as of 10 Dec 2023 19:00  Patient On (Oxygen Delivery Method): room air      09 Dec 2023 07:01  -  10 Dec 2023 07:00  --------------------------------------------------------  IN: 876 mL / OUT: 1560 mL / NET: -684 mL    10 Dec 2023 07:01  -  10 Dec 2023 20:21  --------------------------------------------------------  IN: 1074 mL / OUT: 850 mL / NET: 224 mL        CAPILLARY BLOOD GLUCOSE    CAPILLARY BLOOD GLUCOSE      POCT Blood Glucose.: 206 mg/dL (10 Dec 2023 16:50)      PHYSICAL EXAM:  Appearance: Normal, NAD  HEAD:  Normocephalic  EYES:  PERRLA, conjunctiva and sclera clear  NECK: Supple, No JVD  CHEST/LUNG: Clear to auscultation bilaterally; No wheezing  HEART: Normal S1, S2. No murmurs, rubs, or gallops  ABDOMEN: + Bowel sounds, Soft, NT, ND   EXTREMITIES:  2+ Peripheral Pulses, No clubbing, cyanosis, or edema  NEUROLOGY: non-focal, aaox3  SKIN: No rashes or lesions  Lines: R femoral IABP clean, dry and intact    ============================I/O===========================   I&O's Detail    09 Dec 2023 07:  -  10 Dec 2023 07:00  --------------------------------------------------------  IN:    Heparin: 336 mL    Oral Fluid: 540 mL  Total IN: 876 mL    OUT:    Voided (mL): 1560 mL  Total OUT: 1560 mL    Total NET: -684 mL      10 Dec 2023 07:01  -  10 Dec 2023 20:21  --------------------------------------------------------  IN:    Heparin: 154 mL    Oral Fluid: 920 mL  Total IN: 1074 mL    OUT:    Voided (mL): 850 mL  Total OUT: 850 mL    Total NET: 224 mL        ============================ LABS =========================                        11.3   9.46  )-----------( 135      ( 10 Dec 2023 17:45 )             34.8     12-10    136  |  106  |  32<H>  ----------------------------<  260<H>  3.7   |  17<L>  |  1.06    Ca    9.4      10 Dec 2023 17:45  Phos  2.4     12-10  Mg     1.8     12-10    TPro  6.4  /  Alb  3.6  /  TBili  0.2  /  DBili  x   /  AST  46<H>  /  ALT  136<H>  /  AlkPhos  66  12-10    LIVER FUNCTIONS - ( 10 Dec 2023 17:45 )  Alb: 3.6 g/dL / Pro: 6.4 g/dL / ALK PHOS: 66 U/L / ALT: 136 U/L / AST: 46 U/L / GGT: x           PT/INR - ( 10 Dec 2023 00:29 )   PT: 11.4 sec;   INR: 1.04 ratio         PTT - ( 10 Dec 2023 00:29 )  PTT:60.1 sec    Lactate, Blood: 1.9 mmol/L (12-10-23 @ 17:45)  Lactate, Blood: 1.3 mmol/L (23 @ 00:59)  Lactate, Blood: 1.5 mmol/L (23 @ 01:22)    Urinalysis Basic - ( 10 Dec 2023 17:45 )    Color: x / Appearance: x / SG: x / pH: x  Gluc: 260 mg/dL / Ketone: x  / Bili: x / Urobili: x   Blood: x / Protein: x / Nitrite: x   Leuk Esterase: x / RBC: x / WBC x   Sq Epi: x / Non Sq Epi: x / Bacteria: x      ======================Micro/Rad/Cardio=================  Telemtry: Reviewed   EKG: Reviewed  CXR: Reviewed  Culture: Reviewed   Echo:   Cath: Cardiac Cath Lab - Adult:   [facility]  Department of Cardiology  [address] [place] [address]  [phone]  Cath Lab Report -- Comprehensive Report  Patient: LD VALENCIA  Study date: 2017  Account number: [id number]  MR number: [id number]  : 1965  Gender: Male  Race: W  Case Physician(s):  Jairon Holguin M.D.  Referring Physician:  Luc Lynn M.D.  INDICATIONS: Unstable angina - CCS4.  HISTORY: The patient has a history of coronary artery disease. The patient  hashypertension and medication-treated dyslipidemia.  PROCEDURE:  --  Left heart catheterization with ventriculography.  --  Left coronary angiography.  --  Right coronary angiography.  TECHNIQUE: The risks and alternatives of the procedures and conscious  sedation were explained to the patient and informed consent was obtained.  Cardiac catheterization performed electively.  Local anesthetic given. Right radial artery access. A 6FR PRELUDE KIT was  inserted in the vessel. Left heart catheterization. Ventriculography was  performed. A 5FR FR4.0 EXPO catheter was utilized. Left coronary artery  angiography. The vessel was injected utilizing a 5FR FL3.5 EXPO catheter.  Right coronary artery angiography. The vessel was injected utilizing a 5FR  FR4.0 EXPO catheter. RADIATION EXPOSURE: 1.1 min.  CONTRAST GIVEN: Omnipaque 55 ml.  MEDICATIONS GIVEN: Midazolam, 1 mg, IV. Fentanyl, 25 mcg, IV. Verapamil  (Isoptin, Calan, Covera), 2.5 mg, IA. Heparin, 3000 units, IA.  VENTRICLES: Global left ventricular function was moderately depressed. EF  estimated was 40 %.  CORONARY VESSELS: The coronary circulation is right dominant.  LM:   --  LM: Normal.  LAD:   --  Proximal LAD: There was a 50 % stenosis.  CX:   --  Circumflex: Normal.  RCA:   --  Mid RCA: There was a 40 % stenosis.  --  Distal RCA: There was a 50 % stenosis.  COMPLICATIONS: There were no complications.  DIAGNOSTIC RECOMMENDATIONS: The patient should continue with the present  medications.  Prepared and signed by  Jairon Holguin M.D.  Signed 2017 12:20:13  HEMODYNAMIC TABLES  Pressures:  Baseline/ Room Air  Pressures:  - HR: 78  Pressures:  - Rhythm:  Pressures:  -- Aortic Pressure (S/D/M): --/--/99  Pressures:  -- Left Ventricle (s/edp): 157/39/--  Outputs:  Baseline/ Room Air  Outputs:  -- CALCULATIONS[age]  Outputs:  -- CALCULATIONS: Body Surface Area: 2.05  Outputs:  -- CALCULATIONS: Height in cm: 175.00  Outputs:  -- CALCULATIONS: Sex: Male  Outputs:  -- CALCULATIONS: Weight in k.40 (17 @ 21:55)    ======================================================  PAST MEDICAL & SURGICAL HISTORY:  HTN (hypertension)      CAD (coronary artery disease)  ; stent      Intracranial hemorrhage        Respiratory arrest        Myocardial infarction, unspecified MI type, unspecified artery      History of coronary artery stent placement      ====================ASSESSMENT ==============  58 male with HTN, CAD (s/p PCI ), HFrEF, CVA , and T2DM presenting with chest pressure and unknown tachycardia that was shocked x1, [facility]  found to have in-stent restenosis of pLAD and  of RCA with elevated RA and PA pressures and severely decreased. Admitted to CICU for management of cardiogenic shock and ADHF requiring IABP  -, with hospital course c/b vfib arrest requiring reinsertion of IABP.     ====================== NEUROLOGY=====================  Anxiety  - No Seroquel/antipsychotics since vfib arrest and prolonged QTC  - Psych Eval, recommended SSRI, but pt. refused   - Psych recommending atarax PRN  - Continue to monitor mental status    PT/Conditioning  - Continue band exercised while on bedrest s/t IABP    ==================== RESPIRATORY======================  Acute Hypoxemic Respiratory Failure  - s/p x2 intubations for cardiogenic pulm edema and the in setting of cardiac arrest, resolved - extubated 11/10  - Currently maintaining >95% sats on room air  - Continue incentive spirometry and monitoring of sp02    Asthma  - c/w albuterol, symbicort and spiriva  - On trelegy at home  - Continue to monitor SpO2 with goal >94%    ====================CARDIOVASCULAR==================  Vfib arrest i/s/o ischemia  - Lido gtt off   - PO Amio load - total of 5g per EP complete   - Amio held for rising LFTs, continue to hold  - Keep K > 4, Mag > 2.2     Cardiogenic shock requiring IABP (- , -)  - Likely 2/2 NSTEMI and ADHF  -  LHC: pLAD 100 % in-stent restenosis & mRCA, 100 %. PCWP 30. IABP placed.  -  TTE: LV dilated. EF 32 %. Regional WMAs present, mod (grade 2) LV diastolic dysfunction  -  TTE: EF 22% and + LV thrombus  - IABP swapped  to RFA, continue 1:1 support  - Off Milrinone gtt @ 7:30 am   - Cove d/c'd due to elevated K levels  - continue hydralazine and isordil for a/l reduction  - c/w coreg 25 BID for GDMT  - UNOS status 2 as of       NSTEMI iso stent re-occlusion of pLAD and 100%  of RCA  - EKG on admission w/ LBBB  - DAPT: c/w ASA, Brilinta d/c'd per transplant w/u  - c/w lipitor 80  - cMR deferred given necessity of IABP  - CT sx not recommending CABG, undergoing AT eval    LV thrombus  - c/w heparin gtt w/ therapeutic PTT    ===================== RENAL =========================  Non-oliguric ARIC, resolved   - Baseline Cr: 1-  - Renal US: no evidence of renal artery stenosis  - Trend BMP, lytes daily, replace as needed  - Continue Strict I/Os, avoid nephrotoxins    Mild Hyponatremia/Hyperkalemia iso ARIC  - improved, Na 136 this am  - Continue monitoring urine output, lytes, SCr/ BUN  - Replete lytes prn with goal K >4 and Mg >2    =============== GASTROINTESTINAL===================  Constipation/ileus, resolved  - regular diet  - bowel reg prn    ===================ENDO====================  Type 2 DM  - A1c 8.3   - Continue lantus, premeal, low ISS  - continue FS    ===================HEMATOLOGIC/ONC ===================  - H/H & plts stable  - Monitor H/H and plts  - VTE PPX: heparin gtt    ==================INFECTIOUS DISEASE================  # Positive blood culture, resolved  - Repeat BCx drawn  for leukocytosis to 12k - positive on , G+ rods in anaerobic bottle, suspect contaminant  - Repeat cultures x2 sent  per transplant ID recs - no growth to date  - Vancomycin by trough (-)  - Final microbial ID: Cutibacterium acnes, per ID this is likely to be a skin contaminant, vanc dc'd.   - Dental eval  to rule out infectious etiology that would have led to bacteremia, no significant findings on exam.     # Enterococcus faecalis bacteremia, resolved  - BCx + for enterococcus faecalis x2, Staph epi x1 (likely contaminant)- pan sensitive   - Urine cx  + enterococcus faecalis  - BCx  no growth   - IABP site swapped to RFA   - s/p Vancomycin 1g q12h (-)  - CT A/P negative for infectious pathology    # COVID, resolved  - Off airborne precautions     # Pre-transplant ID w/u   - Trend ID recs for serologies   - Colonoscopy  - normal   - Chest CT  - improved LLL aeration  - s/p immunizations    ==================INFECTIOUS DISEASE================  # Upper Extremity Rash  - Patient developed bilateral upper extremity rash after receiving Hepatitis A & B immunizations on   - Dermatology consulted  - not likely to be related to vanco, can continue per ID recs  - Recommend clobetasol 0.05% BID to affected areas   - RVP repeated to rule out viral etiology, negative    Patient requires continuous monitoring with bedside rhythm monitoring, pulse ox monitoring, and intermittent blood gas analysis. Care plan discussed with ICU care team. Patient remained critical and at risk for life threatening decompensation.  Patient seen, examined and plan discussed with CCU team during rounds.     I have personally provided 30 minutes of critical care time excluding time spent on separate procedures, in addition to initial critical care time provided by the CICU Attending, Dr. Rowe.    By signing my name below, I, Sonia Preciado, attest that this documentation has been prepared under the direction and in the presence of Kitty Braden NP  Electronically signed: Rosalba Freitas, 12-10-23 @ 20:21    I, Kitty Braden NP, personally performed the services described in this documentation. all medical record entries made by the eileenibmartha were at my direction and in my presence. I have reviewed the chart and agree that the record reflects my personal performance and is accurate and complete  Electronically signed: Kitty Braden NP    ======================= DISPOSITION  =====================  [X] Critical   [ ] Guarded    [ ] Stable    [X] Maintain in CICU  [ ] Downgrade to Telemtry  [ ] Discharge Home    Patient requires continuous monitoring with bedside rhythm monitoring, pulse ox monitoring, and intermittent blood gas analysis. Care plan discussed with ICU care team. Patient remained critical and at risk for life threatening decompensation.  Patient seen, examined and plan discussed with CCU team during rounds.     I have personally provided 30 minutes of critical care time excluding time spent on separate procedures, in addition to initial critical care time provided by the CICU Attending, Dr. Jae Hawthorne [facility]  CICU [phone]

## 2023-12-11 LAB
ALBUMIN SERPL ELPH-MCNC: 3.6 G/DL — SIGNIFICANT CHANGE UP (ref 3.3–5)
ALBUMIN SERPL ELPH-MCNC: 3.6 G/DL — SIGNIFICANT CHANGE UP (ref 3.3–5)
ALP SERPL-CCNC: 58 U/L — SIGNIFICANT CHANGE UP (ref 40–120)
ALP SERPL-CCNC: 58 U/L — SIGNIFICANT CHANGE UP (ref 40–120)
ALT FLD-CCNC: 126 U/L — HIGH (ref 10–45)
ALT FLD-CCNC: 126 U/L — HIGH (ref 10–45)
ANION GAP SERPL CALC-SCNC: 9 MMOL/L — SIGNIFICANT CHANGE UP (ref 5–17)
ANION GAP SERPL CALC-SCNC: 9 MMOL/L — SIGNIFICANT CHANGE UP (ref 5–17)
APTT BLD: 67.6 SEC — HIGH (ref 24.5–35.6)
APTT BLD: 67.6 SEC — HIGH (ref 24.5–35.6)
AST SERPL-CCNC: 37 U/L — SIGNIFICANT CHANGE UP (ref 10–40)
AST SERPL-CCNC: 37 U/L — SIGNIFICANT CHANGE UP (ref 10–40)
BASOPHILS # BLD AUTO: 0.02 K/UL — SIGNIFICANT CHANGE UP (ref 0–0.2)
BASOPHILS # BLD AUTO: 0.02 K/UL — SIGNIFICANT CHANGE UP (ref 0–0.2)
BASOPHILS NFR BLD AUTO: 0.2 % — SIGNIFICANT CHANGE UP (ref 0–2)
BASOPHILS NFR BLD AUTO: 0.2 % — SIGNIFICANT CHANGE UP (ref 0–2)
BILIRUB SERPL-MCNC: 0.2 MG/DL — SIGNIFICANT CHANGE UP (ref 0.2–1.2)
BILIRUB SERPL-MCNC: 0.2 MG/DL — SIGNIFICANT CHANGE UP (ref 0.2–1.2)
BUN SERPL-MCNC: 29 MG/DL — HIGH (ref 7–23)
BUN SERPL-MCNC: 29 MG/DL — HIGH (ref 7–23)
CALCIUM SERPL-MCNC: 9.5 MG/DL — SIGNIFICANT CHANGE UP (ref 8.4–10.5)
CALCIUM SERPL-MCNC: 9.5 MG/DL — SIGNIFICANT CHANGE UP (ref 8.4–10.5)
CHLORIDE SERPL-SCNC: 107 MMOL/L — SIGNIFICANT CHANGE UP (ref 96–108)
CHLORIDE SERPL-SCNC: 107 MMOL/L — SIGNIFICANT CHANGE UP (ref 96–108)
CO2 SERPL-SCNC: 20 MMOL/L — LOW (ref 22–31)
CO2 SERPL-SCNC: 20 MMOL/L — LOW (ref 22–31)
CREAT SERPL-MCNC: 0.96 MG/DL — SIGNIFICANT CHANGE UP (ref 0.5–1.3)
CREAT SERPL-MCNC: 0.96 MG/DL — SIGNIFICANT CHANGE UP (ref 0.5–1.3)
EGFR: 91 ML/MIN/1.73M2 — SIGNIFICANT CHANGE UP
EGFR: 91 ML/MIN/1.73M2 — SIGNIFICANT CHANGE UP
EOSINOPHIL # BLD AUTO: 0.76 K/UL — HIGH (ref 0–0.5)
EOSINOPHIL # BLD AUTO: 0.76 K/UL — HIGH (ref 0–0.5)
EOSINOPHIL NFR BLD AUTO: 8.7 % — HIGH (ref 0–6)
EOSINOPHIL NFR BLD AUTO: 8.7 % — HIGH (ref 0–6)
GLUCOSE BLDC GLUCOMTR-MCNC: 110 MG/DL — HIGH (ref 70–99)
GLUCOSE BLDC GLUCOMTR-MCNC: 110 MG/DL — HIGH (ref 70–99)
GLUCOSE BLDC GLUCOMTR-MCNC: 137 MG/DL — HIGH (ref 70–99)
GLUCOSE BLDC GLUCOMTR-MCNC: 137 MG/DL — HIGH (ref 70–99)
GLUCOSE BLDC GLUCOMTR-MCNC: 145 MG/DL — HIGH (ref 70–99)
GLUCOSE BLDC GLUCOMTR-MCNC: 145 MG/DL — HIGH (ref 70–99)
GLUCOSE BLDC GLUCOMTR-MCNC: 199 MG/DL — HIGH (ref 70–99)
GLUCOSE BLDC GLUCOMTR-MCNC: 199 MG/DL — HIGH (ref 70–99)
GLUCOSE SERPL-MCNC: 141 MG/DL — HIGH (ref 70–99)
GLUCOSE SERPL-MCNC: 141 MG/DL — HIGH (ref 70–99)
HCT VFR BLD CALC: 35.2 % — LOW (ref 39–50)
HCT VFR BLD CALC: 35.2 % — LOW (ref 39–50)
HGB BLD-MCNC: 11.4 G/DL — LOW (ref 13–17)
HGB BLD-MCNC: 11.4 G/DL — LOW (ref 13–17)
IMM GRANULOCYTES NFR BLD AUTO: 0.6 % — SIGNIFICANT CHANGE UP (ref 0–0.9)
IMM GRANULOCYTES NFR BLD AUTO: 0.6 % — SIGNIFICANT CHANGE UP (ref 0–0.9)
INR BLD: 1.06 RATIO — SIGNIFICANT CHANGE UP (ref 0.85–1.18)
INR BLD: 1.06 RATIO — SIGNIFICANT CHANGE UP (ref 0.85–1.18)
LACTATE SERPL-SCNC: 1.1 MMOL/L — SIGNIFICANT CHANGE UP (ref 0.5–2)
LACTATE SERPL-SCNC: 1.1 MMOL/L — SIGNIFICANT CHANGE UP (ref 0.5–2)
LYMPHOCYTES # BLD AUTO: 1.33 K/UL — SIGNIFICANT CHANGE UP (ref 1–3.3)
LYMPHOCYTES # BLD AUTO: 1.33 K/UL — SIGNIFICANT CHANGE UP (ref 1–3.3)
LYMPHOCYTES # BLD AUTO: 15.3 % — SIGNIFICANT CHANGE UP (ref 13–44)
LYMPHOCYTES # BLD AUTO: 15.3 % — SIGNIFICANT CHANGE UP (ref 13–44)
MAGNESIUM SERPL-MCNC: 2.2 MG/DL — SIGNIFICANT CHANGE UP (ref 1.6–2.6)
MAGNESIUM SERPL-MCNC: 2.2 MG/DL — SIGNIFICANT CHANGE UP (ref 1.6–2.6)
MCHC RBC-ENTMCNC: 29.7 PG — SIGNIFICANT CHANGE UP (ref 27–34)
MCHC RBC-ENTMCNC: 29.7 PG — SIGNIFICANT CHANGE UP (ref 27–34)
MCHC RBC-ENTMCNC: 32.4 GM/DL — SIGNIFICANT CHANGE UP (ref 32–36)
MCHC RBC-ENTMCNC: 32.4 GM/DL — SIGNIFICANT CHANGE UP (ref 32–36)
MCV RBC AUTO: 91.7 FL — SIGNIFICANT CHANGE UP (ref 80–100)
MCV RBC AUTO: 91.7 FL — SIGNIFICANT CHANGE UP (ref 80–100)
MONOCYTES # BLD AUTO: 0.6 K/UL — SIGNIFICANT CHANGE UP (ref 0–0.9)
MONOCYTES # BLD AUTO: 0.6 K/UL — SIGNIFICANT CHANGE UP (ref 0–0.9)
MONOCYTES NFR BLD AUTO: 6.9 % — SIGNIFICANT CHANGE UP (ref 2–14)
MONOCYTES NFR BLD AUTO: 6.9 % — SIGNIFICANT CHANGE UP (ref 2–14)
NEUTROPHILS # BLD AUTO: 5.93 K/UL — SIGNIFICANT CHANGE UP (ref 1.8–7.4)
NEUTROPHILS # BLD AUTO: 5.93 K/UL — SIGNIFICANT CHANGE UP (ref 1.8–7.4)
NEUTROPHILS NFR BLD AUTO: 68.3 % — SIGNIFICANT CHANGE UP (ref 43–77)
NEUTROPHILS NFR BLD AUTO: 68.3 % — SIGNIFICANT CHANGE UP (ref 43–77)
NRBC # BLD: 0 /100 WBCS — SIGNIFICANT CHANGE UP (ref 0–0)
NRBC # BLD: 0 /100 WBCS — SIGNIFICANT CHANGE UP (ref 0–0)
PHOSPHATE SERPL-MCNC: 2.7 MG/DL — SIGNIFICANT CHANGE UP (ref 2.5–4.5)
PHOSPHATE SERPL-MCNC: 2.7 MG/DL — SIGNIFICANT CHANGE UP (ref 2.5–4.5)
PLATELET # BLD AUTO: 114 K/UL — LOW (ref 150–400)
PLATELET # BLD AUTO: 114 K/UL — LOW (ref 150–400)
POTASSIUM SERPL-MCNC: 4.3 MMOL/L — SIGNIFICANT CHANGE UP (ref 3.5–5.3)
POTASSIUM SERPL-MCNC: 4.3 MMOL/L — SIGNIFICANT CHANGE UP (ref 3.5–5.3)
POTASSIUM SERPL-SCNC: 4.3 MMOL/L — SIGNIFICANT CHANGE UP (ref 3.5–5.3)
POTASSIUM SERPL-SCNC: 4.3 MMOL/L — SIGNIFICANT CHANGE UP (ref 3.5–5.3)
PROT SERPL-MCNC: 6.4 G/DL — SIGNIFICANT CHANGE UP (ref 6–8.3)
PROT SERPL-MCNC: 6.4 G/DL — SIGNIFICANT CHANGE UP (ref 6–8.3)
PROTHROM AB SERPL-ACNC: 11.1 SEC — SIGNIFICANT CHANGE UP (ref 9.5–13)
PROTHROM AB SERPL-ACNC: 11.1 SEC — SIGNIFICANT CHANGE UP (ref 9.5–13)
RBC # BLD: 3.84 M/UL — LOW (ref 4.2–5.8)
RBC # BLD: 3.84 M/UL — LOW (ref 4.2–5.8)
RBC # FLD: 15.9 % — HIGH (ref 10.3–14.5)
RBC # FLD: 15.9 % — HIGH (ref 10.3–14.5)
SODIUM SERPL-SCNC: 136 MMOL/L — SIGNIFICANT CHANGE UP (ref 135–145)
SODIUM SERPL-SCNC: 136 MMOL/L — SIGNIFICANT CHANGE UP (ref 135–145)
WBC # BLD: 8.69 K/UL — SIGNIFICANT CHANGE UP (ref 3.8–10.5)
WBC # BLD: 8.69 K/UL — SIGNIFICANT CHANGE UP (ref 3.8–10.5)
WBC # FLD AUTO: 8.69 K/UL — SIGNIFICANT CHANGE UP (ref 3.8–10.5)
WBC # FLD AUTO: 8.69 K/UL — SIGNIFICANT CHANGE UP (ref 3.8–10.5)

## 2023-12-11 PROCEDURE — 99233 SBSQ HOSP IP/OBS HIGH 50: CPT | Mod: GC

## 2023-12-11 PROCEDURE — 99291 CRITICAL CARE FIRST HOUR: CPT | Mod: 25

## 2023-12-11 PROCEDURE — 71045 X-RAY EXAM CHEST 1 VIEW: CPT | Mod: 26

## 2023-12-11 PROCEDURE — 99292 CRITICAL CARE ADDL 30 MIN: CPT

## 2023-12-11 PROCEDURE — 93010 ELECTROCARDIOGRAM REPORT: CPT

## 2023-12-11 PROCEDURE — 99291 CRITICAL CARE FIRST HOUR: CPT | Mod: GC

## 2023-12-11 RX ORDER — POTASSIUM CHLORIDE 20 MEQ
20 PACKET (EA) ORAL ONCE
Refills: 0 | Status: DISCONTINUED | OUTPATIENT
Start: 2023-12-11 | End: 2023-12-11

## 2023-12-11 RX ADMIN — CARVEDILOL PHOSPHATE 25 MILLIGRAM(S): 80 CAPSULE, EXTENDED RELEASE ORAL at 17:20

## 2023-12-11 RX ADMIN — Medication 75 MILLIGRAM(S): at 16:52

## 2023-12-11 RX ADMIN — Medication 75 MILLIGRAM(S): at 08:34

## 2023-12-11 RX ADMIN — ISOSORBIDE DINITRATE 30 MILLIGRAM(S): 5 TABLET ORAL at 17:19

## 2023-12-11 RX ADMIN — CARVEDILOL PHOSPHATE 25 MILLIGRAM(S): 80 CAPSULE, EXTENDED RELEASE ORAL at 05:31

## 2023-12-11 RX ADMIN — Medication 75 MILLIGRAM(S): at 21:05

## 2023-12-11 RX ADMIN — ISOSORBIDE DINITRATE 30 MILLIGRAM(S): 5 TABLET ORAL at 12:07

## 2023-12-11 RX ADMIN — INSULIN GLARGINE 12 UNIT(S): 100 INJECTION, SOLUTION SUBCUTANEOUS at 21:04

## 2023-12-11 RX ADMIN — BUDESONIDE AND FORMOTEROL FUMARATE DIHYDRATE 2 PUFF(S): 160; 4.5 AEROSOL RESPIRATORY (INHALATION) at 20:55

## 2023-12-11 RX ADMIN — Medication 9 UNIT(S): at 17:15

## 2023-12-11 RX ADMIN — BUDESONIDE AND FORMOTEROL FUMARATE DIHYDRATE 2 PUFF(S): 160; 4.5 AEROSOL RESPIRATORY (INHALATION) at 08:37

## 2023-12-11 RX ADMIN — CHLORHEXIDINE GLUCONATE 1 APPLICATION(S): 213 SOLUTION TOPICAL at 21:21

## 2023-12-11 RX ADMIN — ATORVASTATIN CALCIUM 80 MILLIGRAM(S): 80 TABLET, FILM COATED ORAL at 21:05

## 2023-12-11 RX ADMIN — Medication 9 UNIT(S): at 08:33

## 2023-12-11 RX ADMIN — Medication 9 UNIT(S): at 12:55

## 2023-12-11 RX ADMIN — Medication 81 MILLIGRAM(S): at 12:07

## 2023-12-11 RX ADMIN — ISOSORBIDE DINITRATE 30 MILLIGRAM(S): 5 TABLET ORAL at 05:32

## 2023-12-11 NOTE — CHART NOTE - NSCHARTNOTEFT_GEN_A_CORE
Serum creatinine is back to baseline and sodium has normalized.     Nephrology to sign off at this time. Thank you for allowing us to participate in the care of this patient.   Please reconsult as need for any questions or concerns.

## 2023-12-11 NOTE — PROGRESS NOTE ADULT - PROBLEM SELECTOR PLAN 5
BCx from 11/17 with GPC in pair/chains in both bottles; Mixed cultures Staph epi and Enterococcus faecalis   Repeat cultures from 11/18; NGTD  Currently on vancomycin. Has remote Hx PCN allergy per ID but unclear as he doesn't recall reaction. Cleared for OHT listing  -s/p IABP exchange from RFA to LFA on 11/20.  + rods in blood culture on 11/30 (reported on 12/4), restarted on vancomycin, was status 7, now with repeat Blood cx negative and off abx, back to status 2.   appreciated transplant ID recs.

## 2023-12-11 NOTE — PROGRESS NOTE ADULT - SUBJECTIVE AND OBJECTIVE BOX
PATIENT:  DEVORAH VALENCIA    [id number]    58M with HFrEF (LVIDd 6.4 cm, LVEF 32%), CAD s/p PCI (2008), HTN, DMT2 (A1c 8.3%) and CVA s/p TPA (2018), recently treated for PNA who initially presented to [facility] via EMS after syncope reportedly requiring defibrilation. Treated for ACS but left AMA to come to [facility]. On arrival ECG with ST depression in lateral leads. Intubated 11/1 for respiratory failure. Found to have COVID. L/RHC 11/1revealing  of LAD and RCA, elevated filling pressures and CI of 1.5 prompting placement of IABP. Extubated 11/3. IABP was weaned to off 11/7, then the following day on 11/8, developed PMVT cardiac arrest with prompt CPR and defibrillation, started on IV Lidocaine and Amio. IABP ultimately replaced on 11/9 for worsening hypotension. TTE 11/11 revealing LV thrombus.     INTERVAL HISTORY/OVERNIGHT EVENTS:  The patient was seen and examined at bedside.     REVIEW OF SYSTEMS:  all other ROS negative except as per HPI.     MEDICATIONS:  MEDICATIONS  (STANDING):  aspirin  chewable 81 milliGRAM(s) Oral daily  atorvastatin 80 milliGRAM(s) Oral at bedtime  budesonide  80 MICROgram(s)/formoterol 4.5 MICROgram(s) Inhaler 2 Puff(s) Inhalation two times a day  carvedilol 25 milliGRAM(s) Oral every 12 hours  chlorhexidine 2% Cloths 1 Application(s) Topical daily  clobetasol 0.05% Ointment 1 Application(s) Topical two times a day  heparin  Infusion 1300 Unit(s)/Hr (14 mL/Hr) IV Continuous <Continuous>  hydrALAZINE 75 milliGRAM(s) Oral three times a day  insulin glargine Injectable (LANTUS) 12 Unit(s) SubCutaneous at bedtime  insulin lispro (ADMELOG) corrective regimen sliding scale   SubCutaneous three times a day before meals  insulin lispro (ADMELOG) corrective regimen sliding scale   SubCutaneous at bedtime  insulin lispro Injectable (ADMELOG) 9 Unit(s) SubCutaneous three times a day before meals  isosorbide   dinitrate Tablet (ISORDIL) 30 milliGRAM(s) Oral three times a day  polyethylene glycol 3350 17 Gram(s) Oral two times a day  senna 2 Tablet(s) Oral at bedtime    MEDICATIONS  (PRN):  hydrOXYzine hydrochloride 25 milliGRAM(s) Oral two times a day PRN Anxiety      ALLERGIES:  Allergies    penicillins (Unknown)      OBJECTIVE:  ICU Vital Signs Last 24 Hrs  T(C): 37.1 (11 Dec 2023 03:00), Max: 37.1 (10 Dec 2023 16:00)  T(F): 98.8 (11 Dec 2023 03:00), Max: 98.8 (11 Dec 2023 03:00)  HR: 73 (11 Dec 2023 07:00) (69 - 93)  BP: --  BP(mean): --  ABP: --  ABP(mean): --  RR: 17 (11 Dec 2023 07:00) (14 - 34)  SpO2: 97% (11 Dec 2023 07:00) (95% - 100%)    O2 Parameters below as of 11 Dec 2023 07:00  Patient On (Oxygen Delivery Method): room air      CAPILLARY BLOOD GLUCOSE      POCT Blood Glucose.: 160 mg/dL (10 Dec 2023 21:45)  POCT Blood Glucose.: 206 mg/dL (10 Dec 2023 16:50)  POCT Blood Glucose.: 154 mg/dL (10 Dec 2023 12:32)  POCT Blood Glucose.: 133 mg/dL (10 Dec 2023 08:39)    CAPILLARY BLOOD GLUCOSE      POCT Blood Glucose.: 160 mg/dL (10 Dec 2023 21:45)    I&O's Summary    10 Dec 2023 07:01  -  11 Dec 2023 07:00  --------------------------------------------------------  IN: 1786 mL / OUT: 2100 mL / NET: -314 mL    Daily       PHYSICAL EXAMINATION:  General: WN/WD NAD  HEENT: PERRLA, EOMI, moist mucous membranes  Neurology: A&Ox3, nonfocal, MOISE x 4  Respiratory: CTA B/L, normal respiratory effort, no wheezes, crackles, rales  CV: RRR, S1S2, no murmurs, rubs or gallops  Abdominal: Soft, NT, ND +BS, Last BM  Extremities: No edema, + peripheral pulses  Incisions:   Tubes:    LABS:                        11.4   8.69  )-----------( 114      ( 11 Dec 2023 01:31 )             35.2     12-11    136  |  107  |  29<H>  ----------------------------<  141<H>  4.3   |  20<L>  |  0.96    Ca    9.5      11 Dec 2023 01:31  Phos  2.7     12-11  Mg     2.2     12-11    TPro  6.4  /  Alb  3.6  /  TBili  0.2  /  DBili  x   /  AST  37  /  ALT  126<H>  /  AlkPhos  58  12-11    LIVER FUNCTIONS - ( 11 Dec 2023 01:31 )  Alb: 3.6 g/dL / Pro: 6.4 g/dL / ALK PHOS: 58 U/L / ALT: 126 U/L / AST: 37 U/L / GGT: x           PT/INR - ( 11 Dec 2023 01:31 )   PT: 11.1 sec;   INR: 1.06 ratio         PTT - ( 11 Dec 2023 01:31 )  PTT:67.6 sec        Urinalysis Basic - ( 11 Dec 2023 01:31 )    Color: x / Appearance: x / SG: x / pH: x  Gluc: 141 mg/dL / Ketone: x  / Bili: x / Urobili: x   Blood: x / Protein: x / Nitrite: x   Leuk Esterase: x / RBC: x / WBC x   Sq Epi: x / Non Sq Epi: x / Bacteria: x       PATIENT:  DEVORAH VALENCIA    [id number]    58M with HFrEF (LVIDd 6.4 cm, LVEF 32%), CAD s/p PCI (2008), HTN, DMT2 (A1c 8.3%) and CVA s/p TPA (2018), recently treated for PNA who initially presented to [facility] via EMS after syncope reportedly requiring defibrilation. Treated for ACS but left AMA to come to [facility]. On arrival ECG with ST depression in lateral leads. Intubated 11/1 for respiratory failure. Found to have COVID. L/RHC 11/1revealing  of LAD and RCA, elevated filling pressures and CI of 1.5 prompting placement of IABP. Extubated 11/3. IABP was weaned to off 11/7, then the following day on 11/8, developed PMVT cardiac arrest with prompt CPR and defibrillation, started on IV Lidocaine and Amio. IABP ultimately replaced on 11/9 for worsening hypotension. TTE 11/11 revealing LV thrombus.     INTERVAL HISTORY/OVERNIGHT EVENTS:  The patient was seen and examined at bedside.     REVIEW OF SYSTEMS:  all other ROS negative except as per HPI.     MEDICATIONS:  MEDICATIONS  (STANDING):  aspirin  chewable 81 milliGRAM(s) Oral daily  atorvastatin 80 milliGRAM(s) Oral at bedtime  budesonide  80 MICROgram(s)/formoterol 4.5 MICROgram(s) Inhaler 2 Puff(s) Inhalation two times a day  carvedilol 25 milliGRAM(s) Oral every 12 hours  chlorhexidine 2% Cloths 1 Application(s) Topical daily  clobetasol 0.05% Ointment 1 Application(s) Topical two times a day  heparin  Infusion 1300 Unit(s)/Hr (14 mL/Hr) IV Continuous <Continuous>  hydrALAZINE 75 milliGRAM(s) Oral three times a day  insulin glargine Injectable (LANTUS) 12 Unit(s) SubCutaneous at bedtime  insulin lispro (ADMELOG) corrective regimen sliding scale   SubCutaneous three times a day before meals  insulin lispro (ADMELOG) corrective regimen sliding scale   SubCutaneous at bedtime  insulin lispro Injectable (ADMELOG) 9 Unit(s) SubCutaneous three times a day before meals  isosorbide   dinitrate Tablet (ISORDIL) 30 milliGRAM(s) Oral three times a day  polyethylene glycol 3350 17 Gram(s) Oral two times a day  senna 2 Tablet(s) Oral at bedtime    MEDICATIONS  (PRN):  hydrOXYzine hydrochloride 25 milliGRAM(s) Oral two times a day PRN Anxiety      ALLERGIES:  Allergies    penicillins (Unknown)      OBJECTIVE:  ICU Vital Signs Last 24 Hrs  T(C): 37.1 (11 Dec 2023 03:00), Max: 37.1 (10 Dec 2023 16:00)  T(F): 98.8 (11 Dec 2023 03:00), Max: 98.8 (11 Dec 2023 03:00)  HR: 73 (11 Dec 2023 07:00) (69 - 93)  BP: --  BP(mean): --  ABP: --  ABP(mean): --  RR: 17 (11 Dec 2023 07:00) (14 - 34)  SpO2: 97% (11 Dec 2023 07:00) (95% - 100%)    O2 Parameters below as of 11 Dec 2023 07:00  Patient On (Oxygen Delivery Method): room air      CAPILLARY BLOOD GLUCOSE      POCT Blood Glucose.: 160 mg/dL (10 Dec 2023 21:45)  POCT Blood Glucose.: 206 mg/dL (10 Dec 2023 16:50)  POCT Blood Glucose.: 154 mg/dL (10 Dec 2023 12:32)  POCT Blood Glucose.: 133 mg/dL (10 Dec 2023 08:39)    CAPILLARY BLOOD GLUCOSE      POCT Blood Glucose.: 160 mg/dL (10 Dec 2023 21:45)    I&O's Summary    10 Dec 2023 07:01  -  11 Dec 2023 07:00  --------------------------------------------------------  IN: 1786 mL / OUT: 2100 mL / NET: -314 mL    Daily       PHYSICAL EXAMINATION:  General: WN/WD NAD  HEENT: PERRLA, EOMI, moist mucous membranes  Neurology: A&Ox3, nonfocal, MOISE x 4  Respiratory: CTA B/L, normal respiratory effort, no wheezes, crackles, rales  CV: RRR, S1S2, no murmurs, rubs or gallops  Abdominal: Soft, NT, ND +BS, Last BM  Extremities: No edema, + peripheral pulses  Incisions:   Tubes:    LABS:                        11.4   8.69  )-----------( 114      ( 11 Dec 2023 01:31 )             35.2     12-11    136  |  107  |  29<H>  ----------------------------<  141<H>  4.3   |  20<L>  |  0.96    Ca    9.5      11 Dec 2023 01:31  Phos  2.7     12-11  Mg     2.2     12-11    TPro  6.4  /  Alb  3.6  /  TBili  0.2  /  DBili  x   /  AST  37  /  ALT  126<H>  /  AlkPhos  58  12-11    LIVER FUNCTIONS - ( 11 Dec 2023 01:31 )  Alb: 3.6 g/dL / Pro: 6.4 g/dL / ALK PHOS: 58 U/L / ALT: 126 U/L / AST: 37 U/L / GGT: x           PT/INR - ( 11 Dec 2023 01:31 )   PT: 11.1 sec;   INR: 1.06 ratio         PTT - ( 11 Dec 2023 01:31 )  PTT:67.6 sec        Urinalysis Basic - ( 11 Dec 2023 01:31 )    Color: x / Appearance: x / SG: x / pH: x  Gluc: 141 mg/dL / Ketone: x  / Bili: x / Urobili: x   Blood: x / Protein: x / Nitrite: x   Leuk Esterase: x / RBC: x / WBC x   Sq Epi: x / Non Sq Epi: x / Bacteria: x       PATIENT:  DEVORAH VALENCIA    [id number]    58M with HFrEF (LVIDd 6.4 cm, LVEF 32%), CAD s/p PCI (2008), HTN, DMT2 (A1c 8.3%) and CVA s/p TPA (2018), recently treated for PNA who initially presented to [facility] via EMS after syncope reportedly requiring defibrilation. Treated for ACS but left AMA to come to [facility]. On arrival ECG with ST depression in lateral leads. Intubated 11/1 for respiratory failure. Found to have COVID. L/RHC 11/1revealing  of LAD and RCA, elevated filling pressures and CI of 1.5 prompting placement of IABP. Extubated 11/3. IABP was weaned to off 11/7, then the following day on 11/8, developed PMVT cardiac arrest with prompt CPR and defibrillation, started on IV Lidocaine and Amio. IABP ultimately replaced on 11/9 for worsening hypotension. TTE 11/11 revealing LV thrombus.     INTERVAL HISTORY/OVERNIGHT EVENTS:  The patient was seen and examined at bedside. No acute events overnight. Upper extremity rash improving with steroid cream. Stable on 1:1 IABP.     REVIEW OF SYSTEMS:  all other ROS negative except as per HPI.     MEDICATIONS:  MEDICATIONS  (STANDING):  aspirin  chewable 81 milliGRAM(s) Oral daily  atorvastatin 80 milliGRAM(s) Oral at bedtime  budesonide  80 MICROgram(s)/formoterol 4.5 MICROgram(s) Inhaler 2 Puff(s) Inhalation two times a day  carvedilol 25 milliGRAM(s) Oral every 12 hours  chlorhexidine 2% Cloths 1 Application(s) Topical daily  clobetasol 0.05% Ointment 1 Application(s) Topical two times a day  heparin  Infusion 1300 Unit(s)/Hr (14 mL/Hr) IV Continuous <Continuous>  hydrALAZINE 75 milliGRAM(s) Oral three times a day  insulin glargine Injectable (LANTUS) 12 Unit(s) SubCutaneous at bedtime  insulin lispro (ADMELOG) corrective regimen sliding scale   SubCutaneous three times a day before meals  insulin lispro (ADMELOG) corrective regimen sliding scale   SubCutaneous at bedtime  insulin lispro Injectable (ADMELOG) 9 Unit(s) SubCutaneous three times a day before meals  isosorbide   dinitrate Tablet (ISORDIL) 30 milliGRAM(s) Oral three times a day  polyethylene glycol 3350 17 Gram(s) Oral two times a day  senna 2 Tablet(s) Oral at bedtime    MEDICATIONS  (PRN):  hydrOXYzine hydrochloride 25 milliGRAM(s) Oral two times a day PRN Anxiety      ALLERGIES:  Allergies  penicillins (Unknown)      OBJECTIVE:  ICU Vital Signs Last 24 Hrs  T(C): 37.1 (11 Dec 2023 03:00), Max: 37.1 (10 Dec 2023 16:00)  T(F): 98.8 (11 Dec 2023 03:00), Max: 98.8 (11 Dec 2023 03:00)  HR: 73 (11 Dec 2023 07:00) (69 - 93)  BP: --  BP(mean): --  ABP: --  ABP(mean): --  RR: 17 (11 Dec 2023 07:00) (14 - 34)  SpO2: 97% (11 Dec 2023 07:00) (95% - 100%)    O2 Parameters below as of 11 Dec 2023 07:00  Patient On (Oxygen Delivery Method): room air    CAPILLARY BLOOD GLUCOSE    POCT Blood Glucose.: 160 mg/dL (10 Dec 2023 21:45)  POCT Blood Glucose.: 206 mg/dL (10 Dec 2023 16:50)  POCT Blood Glucose.: 154 mg/dL (10 Dec 2023 12:32)  POCT Blood Glucose.: 133 mg/dL (10 Dec 2023 08:39)      I&O's Summary    10 Dec 2023 07:01  -  11 Dec 2023 07:00  --------------------------------------------------------  IN: 1786 mL / OUT: 2100 mL / NET: -314 mL    Daily       PHYSICAL EXAMINATION:  General: WN/WD NAD  HEENT: PERRLA, EOMI, moist mucous membranes  Neurology: A&Ox3, nonfocal, MOISE x 4  Respiratory: CTA B/L, normal respiratory effort, no wheezes, crackles, rales  CV: RRR, S1S2   Abdominal: Soft, NT, ND +BS, Last BM  Extremities: R groin balloon pump present, site well appearing, no tenderness or swelling appreciated. No edema, + peripheral pulses  Skin: extensive bilateral upper extremity rash noted, improving    LABS:                        11.4   8.69  )-----------( 114      ( 11 Dec 2023 01:31 )             35.2     12-11    136  |  107  |  29<H>  ----------------------------<  141<H>  4.3   |  20<L>  |  0.96    Ca    9.5      11 Dec 2023 01:31  Phos  2.7     12-11  Mg     2.2     12-11    TPro  6.4  /  Alb  3.6  /  TBili  0.2  /  DBili  x   /  AST  37  /  ALT  126<H>  /  AlkPhos  58  12-11    LIVER FUNCTIONS - ( 11 Dec 2023 01:31 )  Alb: 3.6 g/dL / Pro: 6.4 g/dL / ALK PHOS: 58 U/L / ALT: 126 U/L / AST: 37 U/L / GGT: x           PT/INR - ( 11 Dec 2023 01:31 )   PT: 11.1 sec;   INR: 1.06 ratio         PTT - ( 11 Dec 2023 01:31 )  PTT:67.6 sec      Urinalysis Basic - ( 11 Dec 2023 01:31 )    Color: x / Appearance: x / SG: x / pH: x  Gluc: 141 mg/dL / Ketone: x  / Bili: x / Urobili: x   Blood: x / Protein: x / Nitrite: x   Leuk Esterase: x / RBC: x / WBC x   Sq Epi: x / Non Sq Epi: x / Bacteria: x PATIENT:  DEVORAH VALENCIA    [id number]    58M with HFrEF (LVIDd 6.4 cm, LVEF 32%), CAD s/p PCI (2008), HTN, DMT2 (A1c 8.3%) and CVA s/p TPA (2018), recently treated for PNA who initially presented to [facility] via EMS after syncope reportedly requiring defibrilation. Treated for ACS but left AMA to come to [facility]. On arrival ECG with ST depression in lateral leads. Intubated 11/1 for respiratory failure. Found to have COVID. L/RHC 11/1revealing  of LAD and RCA, elevated filling pressures and CI of 1.5 prompting placement of IABP. Extubated 11/3. IABP was weaned to off 11/7, then the following day on 11/8, developed PMVT cardiac arrest with prompt CPR and defibrillation, started on IV Lidocaine and Amio. IABP ultimately replaced on 11/9 for worsening hypotension. TTE 11/11 revealing LV thrombus.     INTERVAL HISTORY/OVERNIGHT EVENTS:  The patient was seen and examined at bedside. No acute events overnight. Upper extremity rash improving with steroid cream. Stable on 1:1 IABP.     REVIEW OF SYSTEMS:  all other ROS negative except as per HPI.     MEDICATIONS:  MEDICATIONS  (STANDING):  aspirin  chewable 81 milliGRAM(s) Oral daily  atorvastatin 80 milliGRAM(s) Oral at bedtime  budesonide  80 MICROgram(s)/formoterol 4.5 MICROgram(s) Inhaler 2 Puff(s) Inhalation two times a day  carvedilol 25 milliGRAM(s) Oral every 12 hours  chlorhexidine 2% Cloths 1 Application(s) Topical daily  clobetasol 0.05% Ointment 1 Application(s) Topical two times a day  heparin  Infusion 1300 Unit(s)/Hr (14 mL/Hr) IV Continuous <Continuous>  hydrALAZINE 75 milliGRAM(s) Oral three times a day  insulin glargine Injectable (LANTUS) 12 Unit(s) SubCutaneous at bedtime  insulin lispro (ADMELOG) corrective regimen sliding scale   SubCutaneous three times a day before meals  insulin lispro (ADMELOG) corrective regimen sliding scale   SubCutaneous at bedtime  insulin lispro Injectable (ADMELOG) 9 Unit(s) SubCutaneous three times a day before meals  isosorbide   dinitrate Tablet (ISORDIL) 30 milliGRAM(s) Oral three times a day  polyethylene glycol 3350 17 Gram(s) Oral two times a day  senna 2 Tablet(s) Oral at bedtime    MEDICATIONS  (PRN):  hydrOXYzine hydrochloride 25 milliGRAM(s) Oral two times a day PRN Anxiety      ALLERGIES:  Allergies  penicillins (Unknown)      OBJECTIVE:  ICU Vital Signs Last 24 Hrs  T(C): 37.1 (11 Dec 2023 03:00), Max: 37.1 (10 Dec 2023 16:00)  T(F): 98.8 (11 Dec 2023 03:00), Max: 98.8 (11 Dec 2023 03:00)  HR: 73 (11 Dec 2023 07:00) (69 - 93)  BP: --  BP(mean): --  ABP: --  ABP(mean): --  RR: 17 (11 Dec 2023 07:00) (14 - 34)  SpO2: 97% (11 Dec 2023 07:00) (95% - 100%)    O2 Parameters below as of 11 Dec 2023 07:00  Patient On (Oxygen Delivery Method): room air    CAPILLARY BLOOD GLUCOSE    POCT Blood Glucose.: 160 mg/dL (10 Dec 2023 21:45)  POCT Blood Glucose.: 206 mg/dL (10 Dec 2023 16:50)  POCT Blood Glucose.: 154 mg/dL (10 Dec 2023 12:32)  POCT Blood Glucose.: 133 mg/dL (10 Dec 2023 08:39)      I&O's Summary    10 Dec 2023 07:01  -  11 Dec 2023 07:00  --------------------------------------------------------  IN: 1786 mL / OUT: 2100 mL / NET: -314 mL    Daily       PHYSICAL EXAMINATION:  General: WN/WD NAD  HEENT: PERRLA, EOMI, moist mucous membranes  Neurology: A&Ox3, nonfocal, MOISE x 4  Respiratory: CTA B/L, normal respiratory effort, no wheezes, crackles, rales  CV: RRR, S1S2   Abdominal: Soft, NT, ND +BS, Last BM  Extremities: R groin balloon pump present, site well appearing, no tenderness or swelling appreciated. No edema, + peripheral pulses  Skin: extensive bilateral upper extremity rash noted, improving    LABS:                        11.4   8.69  )-----------( 114      ( 11 Dec 2023 01:31 )             35.2     12-11    136  |  107  |  29<H>  ----------------------------<  141<H>  4.3   |  20<L>  |  0.96    Ca    9.5      11 Dec 2023 01:31  Phos  2.7     12-11  Mg     2.2     12-11    TPro  6.4  /  Alb  3.6  /  TBili  0.2  /  DBili  x   /  AST  37  /  ALT  126<H>  /  AlkPhos  58  12-11    LIVER FUNCTIONS - ( 11 Dec 2023 01:31 )  Alb: 3.6 g/dL / Pro: 6.4 g/dL / ALK PHOS: 58 U/L / ALT: 126 U/L / AST: 37 U/L / GGT: x           PT/INR - ( 11 Dec 2023 01:31 )   PT: 11.1 sec;   INR: 1.06 ratio         PTT - ( 11 Dec 2023 01:31 )  PTT:67.6 sec      Urinalysis Basic - ( 11 Dec 2023 01:31 )    Color: x / Appearance: x / SG: x / pH: x  Gluc: 141 mg/dL / Ketone: x  / Bili: x / Urobili: x   Blood: x / Protein: x / Nitrite: x   Leuk Esterase: x / RBC: x / WBC x   Sq Epi: x / Non Sq Epi: x / Bacteria: x

## 2023-12-11 NOTE — PROGRESS NOTE ADULT - SUBJECTIVE AND OBJECTIVE BOX
INTERVAL HPI/OVERNIGHT EVENTS:    Rash is improving with clobetasol, denies itch.  ________________________________    REVIEW OF SYSTEMS    General: no fevers/chills, no NS	    Skin: see HPI  	  Ophthalmologic: no eye pain or change in vision    Genitourinary: no dysuria or hematuria    Musculoskeletal: no joint pains or weakness	    Neurological:no weakness or tingling    ROS negative except if noted in the above subjective text. All other systems reviewed and negative.    MEDICATIONS  (STANDING):  aspirin  chewable 81 milliGRAM(s) Oral daily  atorvastatin 80 milliGRAM(s) Oral at bedtime  budesonide  80 MICROgram(s)/formoterol 4.5 MICROgram(s) Inhaler 2 Puff(s) Inhalation two times a day  carvedilol 25 milliGRAM(s) Oral every 12 hours  chlorhexidine 2% Cloths 1 Application(s) Topical daily  clobetasol 0.05% Ointment 1 Application(s) Topical two times a day  heparin  Infusion 1300 Unit(s)/Hr (14 mL/Hr) IV Continuous <Continuous>  hydrALAZINE 75 milliGRAM(s) Oral three times a day  insulin glargine Injectable (LANTUS) 12 Unit(s) SubCutaneous at bedtime  insulin lispro (ADMELOG) corrective regimen sliding scale   SubCutaneous three times a day before meals  insulin lispro (ADMELOG) corrective regimen sliding scale   SubCutaneous at bedtime  insulin lispro Injectable (ADMELOG) 9 Unit(s) SubCutaneous three times a day before meals  isosorbide   dinitrate Tablet (ISORDIL) 30 milliGRAM(s) Oral three times a day  polyethylene glycol 3350 17 Gram(s) Oral two times a day  senna 2 Tablet(s) Oral at bedtime    MEDICATIONS  (PRN):  hydrOXYzine hydrochloride 25 milliGRAM(s) Oral two times a day PRN Anxiety      Allergies    penicillins (Unknown)    Intolerances          O: ICU Vital Signs Last 24 Hrs  T(C): 36.6 (11 Dec 2023 17:00), Max: 37.2 (11 Dec 2023 08:00)  T(F): 97.9 (11 Dec 2023 17:00), Max: 98.9 (11 Dec 2023 08:00)  HR: 78 (11 Dec 2023 17:00) (72 - 87)  BP: --  BP(mean): --  ABP: --  ABP(mean): --  RR: 21 (11 Dec 2023 17:00) (14 - 34)  SpO2: 98% (11 Dec 2023 17:00) (95% - 99%)    O2 Parameters below as of 11 Dec 2023 17:00  Patient On (Oxygen Delivery Method): room air          I&O's Detail    10 Dec 2023 07:01  -  11 Dec 2023 07:00  --------------------------------------------------------  IN:    Heparin: 336 mL    IV PiggyBack: 50 mL    Oral Fluid: 1400 mL  Total IN: 1786 mL    OUT:    Voided (mL): 2100 mL  Total OUT: 2100 mL    Total NET: -314 mL      11 Dec 2023 07:01  -  11 Dec 2023 18:16  --------------------------------------------------------  IN:    Heparin: 140 mL    Oral Fluid: 490 mL  Total IN: 630 mL    OUT:    Voided (mL): 500 mL  Total OUT: 500 mL    Total NET: 130 mL          ___________________________________  PHYSICAL EXAM:     The patient was alert and oriented X 3, laying in bed  OP showed no ulcerations  There was no visible lymphadenopathy.  Conjunctiva were non injected  There was no clubbing or edema of extremities.  The scalp, hair, face, eyebrows, lips, OP, neck, chest, back,   extremities X 4, nails were examined.  There was no hyperhidrosis or bromhidrosis.    Of note on skin exam:   pink patches b/l arms, neck, chest, and legs with desquamation on b/l lower legs--fading  ____________________________________      Labs:                       11.4   8.69  )-----------( 114      ( 11 Dec 2023 01:31 )             35.2     CBC Full  -  ( 11 Dec 2023 01:31 )  WBC Count : 8.69 K/uL  RBC Count : 3.84 M/uL  Hemoglobin : 11.4 g/dL  Hematocrit : 35.2 %  Platelet Count - Automated : 114 K/uL  Mean Cell Volume : 91.7 fl  Mean Cell Hemoglobin : 29.7 pg  Mean Cell Hemoglobin Concentration : 32.4 gm/dL  Auto Neutrophil # : 5.93 K/uL  Auto Lymphocyte # : 1.33 K/uL  Auto Monocyte # : 0.60 K/uL  Auto Eosinophil # : 0.76 K/uL  Auto Basophil # : 0.02 K/uL  Auto Neutrophil % : 68.3 %  Auto Lymphocyte % : 15.3 %  Auto Monocyte % : 6.9 %  Auto Eosinophil % : 8.7 %  Auto Basophil % : 0.2 %    12-11    136  |  107  |  29<H>  ----------------------------<  141<H>  4.3   |  20<L>  |  0.96    Ca    9.5      11 Dec 2023 01:31  Phos  2.7     12-11  Mg     2.2     12-11    TPro  6.4  /  Alb  3.6  /  TBili  0.2  /  DBili  x   /  AST  37  /  ALT  126<H>  /  AlkPhos  58  12-11    PT/INR - ( 11 Dec 2023 01:31 )   PT: 11.1 sec;   INR: 1.06 ratio         PTT - ( 11 Dec 2023 01:31 )  PTT:67.6 sec  Urinalysis Basic - ( 11 Dec 2023 01:31 )    Color: x / Appearance: x / SG: x / pH: x  Gluc: 141 mg/dL / Ketone: x  / Bili: x / Urobili: x   Blood: x / Protein: x / Nitrite: x   Leuk Esterase: x / RBC: x / WBC x   Sq Epi: x / Non Sq Epi: x / Bacteria: x      CAPILLARY BLOOD GLUCOSE      POCT Blood Glucose.: 145 mg/dL (11 Dec 2023 17:11)

## 2023-12-11 NOTE — PROGRESS NOTE ADULT - ATTENDING COMMENTS
Morbilliform eruption resolving. Mild peripheral eosinophilia and stable transaminitis may be indicative of DIHS. Considering the rash is significanltly improved, will hold off on recommending prednisone at this time. Cont clobetasol prn.    Fercho Patel MD, PharmD, MPH  Co-Director, Inpatient Dermatology Consultation Service, [facility] Morbilliform eruption resolving. Mild peripheral eosinophilia and stable transaminitis may be indicative of DIHS. Considering the rash is significanltly improved, will hold off on recommending prednisone at this time. Cont clobetasol prn.    Fercho Patel MD, PharmD, MPH  Co-Director, Inpatient Dermatology Consultation Service, [facility]

## 2023-12-11 NOTE — PROGRESS NOTE ADULT - SUBJECTIVE AND OBJECTIVE BOX
LD VALENCIA  58y Male  MRN:[id number]    Patient is a 58y old  Male who presents with a chief complaint of NSTEMI (01 Nov 2023 20:29)    HPI:  57yo M w/ hx HTN, CAD w/ 1 stent in 2009, ICH (2008) presenting with abn ekg. Patient presented to [facility] where he was found to have STEMI, recommended to get cath however patient did not want to get it there so it left and came here.  Patient initially had cough, congestion, fever, was placed on antibiotics on Sunday.  Started feeling nauseous and had a presyncopal event after which he presented to ED last night.  Had chest pain as well.  Chest pain is midsternal.  Not currently having chest pain.  Received 4 aspirin 30 min pta. (01 Nov 2023 15:11)      Patient seen and evaluated at bedside in CCU. interval events noted    Interval HPI: remain in ccu. IABP in place        PAST MEDICAL & SURGICAL HISTORY:  HTN (hypertension)      CAD (coronary artery disease)  2009; stent      Intracranial hemorrhage  2008      Respiratory arrest  december 1st      Myocardial infarction, unspecified MI type, unspecified artery      History of coronary artery stent placement          REVIEW OF SYSTEMS:  as per hpi     VITALS:   ICU Vital Signs Last 24 Hrs  T(C): 36.7 (11 Dec 2023 19:00), Max: 37.2 (11 Dec 2023 08:00)  T(F): 98 (11 Dec 2023 19:00), Max: 98.9 (11 Dec 2023 08:00)  HR: 78 (11 Dec 2023 21:00) (72 - 87)  BP: --  BP(mean): --  ABP: --  ABP(mean): --  RR: 20 (11 Dec 2023 21:00) (14 - 34)  SpO2: 97% (11 Dec 2023 18:00) (95% - 99%)    O2 Parameters below as of 11 Dec 2023 19:00  Patient On (Oxygen Delivery Method): room air                      PHYSICAL EXAM:  GENERAL: NAD, comfortable   HEAD:  Atraumatic, Normocephalic  EYES: EOMI, PERRLA, conjunctiva and sclera clear  NECK: Supple, No JVD   CHEST/LUNG: Clear to auscultation bilaterally; No wheeze  HEART: Regular rate and rhythm; No murmurs, rubs, or gallops  ABDOMEN: Soft, Nontender, Nondistended; Bowel sounds present  EXTREMITIES:  2+ Peripheral Pulses, No clubbing, cyanosis, or edema  NEUROLOGY: nonfocal  SKIN: +rash  +iabp    Consultant(s) Notes Reviewed:  [x ] YES  [ ] NO  Care Discussed with Consultants/Other Providers [ x] YES  [ ] NO    MEDS:   MEDICATIONS  (STANDING):  aspirin  chewable 81 milliGRAM(s) Oral daily  atorvastatin 80 milliGRAM(s) Oral at bedtime  budesonide  80 MICROgram(s)/formoterol 4.5 MICROgram(s) Inhaler 2 Puff(s) Inhalation two times a day  carvedilol 25 milliGRAM(s) Oral every 12 hours  chlorhexidine 2% Cloths 1 Application(s) Topical daily  clobetasol 0.05% Ointment 1 Application(s) Topical two times a day  heparin  Infusion 1300 Unit(s)/Hr (14 mL/Hr) IV Continuous <Continuous>  hydrALAZINE 75 milliGRAM(s) Oral three times a day  insulin glargine Injectable (LANTUS) 12 Unit(s) SubCutaneous at bedtime  insulin lispro (ADMELOG) corrective regimen sliding scale   SubCutaneous at bedtime  insulin lispro (ADMELOG) corrective regimen sliding scale   SubCutaneous three times a day before meals  insulin lispro Injectable (ADMELOG) 9 Unit(s) SubCutaneous three times a day before meals  isosorbide   dinitrate Tablet (ISORDIL) 30 milliGRAM(s) Oral three times a day  polyethylene glycol 3350 17 Gram(s) Oral two times a day  senna 2 Tablet(s) Oral at bedtime    MEDICATIONS  (PRN):  hydrOXYzine hydrochloride 25 milliGRAM(s) Oral two times a day PRN Anxiety      ALLERGIES:  penicillins (Unknown)      LABS:                                        11.4   8.69  )-----------( 114      ( 11 Dec 2023 01:31 )             35.2   12-11    136  |  107  |  29<H>  ----------------------------<  141<H>  4.3   |  20<L>  |  0.96    Ca    9.5      11 Dec 2023 01:31  Phos  2.7     12-11  Mg     2.2     12-11    TPro  6.4  /  Alb  3.6  /  TBili  0.2  /  DBili  x   /  AST  37  /  ALT  126<H>  /  AlkPhos  58  12-11    < from: CT Abdomen and Pelvis No Cont (11.28.23 @ 03:38) >  IMPRESSION:  Mildly dilated colon and prominent but not overly dilated small bowel   with air-fluid levels. No discrete transition point. Findings are   suggestive of ileus.    Intra-aortic balloon pump with the inferior marker at the level of the   inferior mesenteric artery and the balloon overlying the origins of the   renal arteries, celiac artery, and superior mesenteric artery. Consider   repositioning. Concern for IABP positioning was discussed with LANETTE Hawthorne   on 11/28/2023 at 3:42 AM by Dr. Shepard.    < end of copied text >          < from: Colonoscopy (11.17.23 @ 10:35) >                                                                                                        Impression:          - The entire examined colon is normal on direct and retroflexion views.                       - No specimens collected.  Recommendation:      - Resume previous diet today.                       - No large polyps or masses detected, No objection from GI standpoint to                 proceed with heart transplantation/advanced heart therapies.                       - Please call back should any further questions or concerns arise.    < end of copied text >     < from: Xray Chest 1 View- PORTABLE-Urgent (11.01.23 @ 07:42) >    IMPRESSION:  Clear lungs.    ---End of Report ---        < end of copied text >  < from: TTE W or WO Ultrasound Enhancing Agent (11.01.23 @ 10:23) >  _____________________________     CONCLUSIONS:      1. Left ventricular cavity is moderately dilated. Left ventricular wall thickness is normal. Left ventricular systolic function is severely decreased with an ejection fraction of 32 % by Chinchilla's method of disks. Regional wall motion abnormalities present.   2. Multiple segmental abnormalities exist. See findings.   3. There is moderate (grade 2) left ventricular diastolic dysfunction, with indeterminant filling pressure.   4. Normal right ventricular cavity size, wall thickness, and systolic function.   5. No significant valvular disease.   6. No pericardial effusion seen.   7. Compared to the transthoracic echocardiogram performed on 1/25/2017 the areas of akinesis are unchanged but there has been a decline in LV systolic function with new areas of hypokinesis.    __________________________________________________________________    < end of copied text >  < from: TTE Limited W or WO Ultrasound Enhancing Agent (11.02.23 @ 07:41) >  __________________________     CONCLUSIONS:      1. After obtaining consent, Definity ultrasound enhancing agent was given for enhanced left ventricular opacification and improved delineation of the left ventricular endocardial borders. Left ventricular systolic function is severely decreased with a calculated ejection fraction of 22 % by the Chinchilla's biplane method of disks. There is a left ventricular thrombus.   2. Findings were discussed with Litzy BOSS on 11/2/2023 at 8.49am.   3. There is a left ventricular thrombus.    _________________________________________________________________    < end of copied text >   LD VALENCIA  58y Male  MRN:[id number]    Patient is a 58y old  Male who presents with a chief complaint of NSTEMI (01 Nov 2023 20:29)    HPI:  57yo M w/ hx HTN, CAD w/ 1 stent in 2009, ICH (2008) presenting with abn ekg. Patient presented to [facility] where he was found to have STEMI, recommended to get cath however patient did not want to get it there so it left and came here.  Patient initially had cough, congestion, fever, was placed on antibiotics on Sunday.  Started feeling nauseous and had a presyncopal event after which he presented to ED last night.  Had chest pain as well.  Chest pain is midsternal.  Not currently having chest pain.  Received 4 aspirin 30 min pta. (01 Nov 2023 15:11)      Patient seen and evaluated at bedside in CCU. interval events noted    Interval HPI: remain in ccu. IABP in place        PAST MEDICAL & SURGICAL HISTORY:  HTN (hypertension)      CAD (coronary artery disease)  2009; stent      Intracranial hemorrhage  2008      Respiratory arrest  december 1st      Myocardial infarction, unspecified MI type, unspecified artery      History of coronary artery stent placement          REVIEW OF SYSTEMS:  as per hpi     VITALS:   ICU Vital Signs Last 24 Hrs  T(C): 36.7 (11 Dec 2023 19:00), Max: 37.2 (11 Dec 2023 08:00)  T(F): 98 (11 Dec 2023 19:00), Max: 98.9 (11 Dec 2023 08:00)  HR: 78 (11 Dec 2023 21:00) (72 - 87)  BP: --  BP(mean): --  ABP: --  ABP(mean): --  RR: 20 (11 Dec 2023 21:00) (14 - 34)  SpO2: 97% (11 Dec 2023 18:00) (95% - 99%)    O2 Parameters below as of 11 Dec 2023 19:00  Patient On (Oxygen Delivery Method): room air                      PHYSICAL EXAM:  GENERAL: NAD, comfortable   HEAD:  Atraumatic, Normocephalic  EYES: EOMI, PERRLA, conjunctiva and sclera clear  NECK: Supple, No JVD   CHEST/LUNG: Clear to auscultation bilaterally; No wheeze  HEART: Regular rate and rhythm; No murmurs, rubs, or gallops  ABDOMEN: Soft, Nontender, Nondistended; Bowel sounds present  EXTREMITIES:  2+ Peripheral Pulses, No clubbing, cyanosis, or edema  NEUROLOGY: nonfocal  SKIN: +rash  +iabp    Consultant(s) Notes Reviewed:  [x ] YES  [ ] NO  Care Discussed with Consultants/Other Providers [ x] YES  [ ] NO    MEDS:   MEDICATIONS  (STANDING):  aspirin  chewable 81 milliGRAM(s) Oral daily  atorvastatin 80 milliGRAM(s) Oral at bedtime  budesonide  80 MICROgram(s)/formoterol 4.5 MICROgram(s) Inhaler 2 Puff(s) Inhalation two times a day  carvedilol 25 milliGRAM(s) Oral every 12 hours  chlorhexidine 2% Cloths 1 Application(s) Topical daily  clobetasol 0.05% Ointment 1 Application(s) Topical two times a day  heparin  Infusion 1300 Unit(s)/Hr (14 mL/Hr) IV Continuous <Continuous>  hydrALAZINE 75 milliGRAM(s) Oral three times a day  insulin glargine Injectable (LANTUS) 12 Unit(s) SubCutaneous at bedtime  insulin lispro (ADMELOG) corrective regimen sliding scale   SubCutaneous at bedtime  insulin lispro (ADMELOG) corrective regimen sliding scale   SubCutaneous three times a day before meals  insulin lispro Injectable (ADMELOG) 9 Unit(s) SubCutaneous three times a day before meals  isosorbide   dinitrate Tablet (ISORDIL) 30 milliGRAM(s) Oral three times a day  polyethylene glycol 3350 17 Gram(s) Oral two times a day  senna 2 Tablet(s) Oral at bedtime    MEDICATIONS  (PRN):  hydrOXYzine hydrochloride 25 milliGRAM(s) Oral two times a day PRN Anxiety      ALLERGIES:  penicillins (Unknown)      LABS:                                        11.4   8.69  )-----------( 114      ( 11 Dec 2023 01:31 )             35.2   12-11    136  |  107  |  29<H>  ----------------------------<  141<H>  4.3   |  20<L>  |  0.96    Ca    9.5      11 Dec 2023 01:31  Phos  2.7     12-11  Mg     2.2     12-11    TPro  6.4  /  Alb  3.6  /  TBili  0.2  /  DBili  x   /  AST  37  /  ALT  126<H>  /  AlkPhos  58  12-11    < from: CT Abdomen and Pelvis No Cont (11.28.23 @ 03:38) >  IMPRESSION:  Mildly dilated colon and prominent but not overly dilated small bowel   with air-fluid levels. No discrete transition point. Findings are   suggestive of ileus.    Intra-aortic balloon pump with the inferior marker at the level of the   inferior mesenteric artery and the balloon overlying the origins of the   renal arteries, celiac artery, and superior mesenteric artery. Consider   repositioning. Concern for IABP positioning was discussed with LANETTE Hawthorne   on 11/28/2023 at 3:42 AM by Dr. Shepard.    < end of copied text >          < from: Colonoscopy (11.17.23 @ 10:35) >                                                                                                        Impression:          - The entire examined colon is normal on direct and retroflexion views.                       - No specimens collected.  Recommendation:      - Resume previous diet today.                       - No large polyps or masses detected, No objection from GI standpoint to                 proceed with heart transplantation/advanced heart therapies.                       - Please call back should any further questions or concerns arise.    < end of copied text >     < from: Xray Chest 1 View- PORTABLE-Urgent (11.01.23 @ 07:42) >    IMPRESSION:  Clear lungs.    ---End of Report ---        < end of copied text >  < from: TTE W or WO Ultrasound Enhancing Agent (11.01.23 @ 10:23) >  _____________________________     CONCLUSIONS:      1. Left ventricular cavity is moderately dilated. Left ventricular wall thickness is normal. Left ventricular systolic function is severely decreased with an ejection fraction of 32 % by Chinchilla's method of disks. Regional wall motion abnormalities present.   2. Multiple segmental abnormalities exist. See findings.   3. There is moderate (grade 2) left ventricular diastolic dysfunction, with indeterminant filling pressure.   4. Normal right ventricular cavity size, wall thickness, and systolic function.   5. No significant valvular disease.   6. No pericardial effusion seen.   7. Compared to the transthoracic echocardiogram performed on 1/25/2017 the areas of akinesis are unchanged but there has been a decline in LV systolic function with new areas of hypokinesis.    __________________________________________________________________    < end of copied text >  < from: TTE Limited W or WO Ultrasound Enhancing Agent (11.02.23 @ 07:41) >  __________________________     CONCLUSIONS:      1. After obtaining consent, Definity ultrasound enhancing agent was given for enhanced left ventricular opacification and improved delineation of the left ventricular endocardial borders. Left ventricular systolic function is severely decreased with a calculated ejection fraction of 22 % by the Chinchilla's biplane method of disks. There is a left ventricular thrombus.   2. Findings were discussed with Litzy BOSS on 11/2/2023 at 8.49am.   3. There is a left ventricular thrombus.    _________________________________________________________________    < end of copied text >

## 2023-12-11 NOTE — PROGRESS NOTE ADULT - ASSESSMENT
# Morbilliform rash, resolving--ddx includes 2/2 drug vs mild DIHS  Without fevers. Mild stable eosinophilia and now with Cr back to baseline. Mildly elevated ALT.   RVP 12/5 negative  Relevant inpatient medications:  - cefepime 11/2  - vancomycin 11/18-20, 11/25-27, 12/4-5  - hepatitis A vaccine 11/23, hep B vaccine 11/25, DTAP 11/26, shingrix 11/26    PLAN:   - May continue clobetasol 0.05% ointment BID to affected areas for up to 2 weeks on, then take 1 week off before restarting. SE of prolonged use include skin thinning/striae/steroid acne  - Please inform us if rash worsens    Patient was seen at bedside and staffed in person with the dermatology attending Dr. Patel.   Recommendations were communicated with the primary team.  Please page [phone] for further related questions.    Dixie Garcia MD  Resident Physician, PGY3  [facility]  Pager: [phone] # Morbilliform rash, resolving--ddx includes 2/2 drug vs mild DIHS  Without fevers. Mild stable eosinophilia and now with Cr back to baseline. Mildly elevated ALT.   RVP 12/5 negative  Relevant inpatient medications:  - cefepime 11/2  - vancomycin 11/18-20, 11/25-27, 12/4-5  - hepatitis A vaccine 11/23, hep B vaccine 11/25, DTAP 11/26, shingrix 11/26    PLAN:   - May continue clobetasol 0.05% ointment BID to affected areas for up to 2 weeks on, then take 1 week off before restarting. SE of prolonged use include skin thinning/striae/steroid acne  - Please inform us if rash worsens    Patient was seen at bedside and staffed in person with the dermatology attending Dr. Patel.   Recommendations were communicated with the primary team.  Please page [phone] for further related questions.    Dixie Garcia MD  Resident Physician, PGY3  [facility] Dermat[facility]  Pager: [phone]

## 2023-12-11 NOTE — PROGRESS NOTE ADULT - PROBLEM SELECTOR PLAN 1
-Listen UNOS status 2.   - L IABP at 1:1, placed 11/9. Exchange to LFA given high grade bacteremia on 11/20. On AC with Heparin infusion (also for LV thrombus)  - Continue Coreg 25 mg BID hold for MAP <65  - Continue HDZN 75 mg TID and ISDN 30 mg TID, hold for MAP <65  - Off romel given HyperK  -allow for more liberal PO liquid intake  - AT evaluation: Accepted for transplant, currently listed UNOS Status 2. ABO A. PRA 0% 11/13. Last Brilinta dose was on 11/13, However, downgraded to status 7 on 12/4 given + blood culture. But now re-upgraded to status 2.   -will send repeat PSAs   - Please keep primary Dr. Banuelos [phone] in the loop per family request. -Listen UNOS status 2.   - L IABP at 1:1, placed 11/9. Exchange to LFA given high grade bacteremia on 11/20. On AC with Heparin infusion (also for LV thrombus)  - Continue Coreg 25 mg BID hold for MAP <65  - Continue HDZN 75 mg TID and ISDN 30 mg TID, hold for MAP <65  - Off romel given HyperK  -allow for more liberal PO liquid intake  - AT evaluation: Accepted for transplant, currently listed UNOS Status 2. ABO A. PRA 0% 11/13. Last Brilinta dose was on 11/13, However, downgraded to status 7 on 12/4 given + blood culture. But now re-upgraded to status 2.   -will send repeat PSAs   - Please keep primary Dr. Banuelos [phone] in the loop per family request. -Listen UNOS status 2.   - L IABP at 1:1, placed 11/9. Exchange to LFA given high grade bacteremia on 11/20. On AC with Heparin infusion (also for LV thrombus)  - Continue Coreg 25 mg BID hold for MAP <65  - Continue HDZN 75 mg TID and ISDN 30 mg TID, hold for MAP <65  - Off romel given HyperK  -allow for more liberal PO liquid intake  - AT evaluation: Accepted for transplant, currently listed UNOS Status 2. ABO A. PRA 0% 11/13. Last Brilinta dose was on 11/13, However, downgraded to status 7 on 12/4 given + blood culture. But now re-upgraded to status 2.   - will send repeat PSAs   - Please keep primary Dr. Banuelos [phone] in the loop per family request. -Listen UNOS status 2.   - L IABP at 1:1, placed 11/9. Exchange to LFA given high grade bacteremia on 11/20. On AC with Heparin infusion (also for LV thrombus)  - Continue Coreg 25 mg BID hold for MAP <65  - Continue HDZN 75 mg TID and ISDN 30 mg TID, hold for MAP <65  - Off romel given HyperK  -allow for more liberal PO liquid intake  - AT evaluation: Accepted for transplant, currently listed UNOS Status 2. ABO A. PRA 0% 11/13. Last Brilinta dose was on 11/13, However, downgraded to status 7 on 12/4 given + blood culture. But now re-upgraded to status 2.   - will send repeat PSAs   - Please keep primary Dr. Banuelos [phone] in the loop per family request.

## 2023-12-11 NOTE — PROGRESS NOTE ADULT - ATTENDING COMMENTS
No acute events overnight.  Pt denies overt HF symptoms. He is hoping that he can move forward with his transplant soon.  Appears euvolemic on the physical exam, lukewarm extremities.  Dependent on tMCS/IABP 1:1.   Listed UNOs status 2.   Will send repeat PRAs.

## 2023-12-11 NOTE — PROGRESS NOTE ADULT - ASSESSMENT
59 yo M with HFrEF (LVIDd 6.4 cm, LVEF 32%), CAD s/p PCI (2008), HTN, DMT2 (A1c 8.3%) and CVA s/p TPA (2018), recently treated for PNA who initially presented to [facility] via EMS after syncope reportedly requiring defibrilation. Treated for ACS but left AMA to come to [facility]. On arrival ECG with ST depression in lateral leads. Intubated 11/1 for respiratory failure. Found to have COVID. L/RHC 11/1revealing  of LAD and RCA, elevated filling pressures and CI of 1.5 prompting placement of IABP. Extubated 11/3. IABP was weaned to off 11/7, then the following day on 11/8, developed PMVT cardiac arrest with prompt CPR and defibrillation, started on IV Lidocaine and Amio. IABP ultimately replaced on 11/9 for worsening hypotension. TTE 11/11 revealing LV thrombus.     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 AT evaluation launched 11/10, he is ABO A. He was discussed during TSC on 11/16 and was approved for listing, however was found to be Bacteremic with 11/17 Blc Cx growing mixed cultures Staph epi and Enterococcus faecalis and started on IV Vanco. Repeat cultures from 11/18 reveal NGTD and therefore was cleared by ID for listing.     He appears adequately supported on IABP at 1:1, electrically quiescent on oral Amiodarone. Cr is improving with holding diuretics. Labs otherwise notable for worsening thrombocytopenia. Awaiting suitable donor. GM + rods (Cutibacterium) in blood culture on 11/30 (reported on 12/4), restarted on vancomycin, and status 7, repeat cultures negative x48 hours (12/6),  now status 2 again.         Cardiac Studies  11/21/23 TTE: limited unable to rule out endocarditis, technically difficult study  11/1123 TTE: LVEF 22% (global), LV thrombus, normal RV size and function, mild MR, trace TR  11/1/23  LHC showed pLAD 70 % stenosis in the ostium portion of the segment and 100 % in-stent restenosis and in mRCA, 100 % stenosis.   11/1/23 RHC; RA 23 Vw 24, PA 52/33/40, PCWP 30 Vw 33, AO 85/66/73, PA sat 54.4%, CO/CI (F) 2.96/1.44, IABP was placed during the cath through R common femoral artery.   11/1/23 TTE: LVIDd 6.4cm , LVEF 32%, regional WMA, grade II DD,  TAPSE 1.7cm, mld MR, trace TR,      59 yo M with HFrEF (LVIDd 6.4 cm, LVEF 32%), CAD s/p PCI (2008), HTN, DMT2 (A1c 8.3%) and CVA s/p TPA (2018), recently treated for PNA who initially presented to [facility] via EMS after syncope reportedly requiring defibrilation. Treated for ACS but left AMA to come to [facility]. On arrival ECG with ST depression in lateral leads. Intubated 11/1 for respiratory failure. Found to have COVID. L/RHC 11/1revealing  of LAD and RCA, elevated filling pressures and CI of 1.5 prompting placement of IABP. Extubated 11/3. IABP was weaned to off 11/7, then the following day on 11/8, developed PMVT cardiac arrest with prompt CPR and defibrillation, started on IV Lidocaine and Amio. IABP ultimately replaced on 11/9 for worsening hypotension. TTE 11/11 revealing LV thrombus.     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 AT evaluation launched 11/10, he is ABO A. He was discussed during TSC on 11/16 and was approved for listing, however was found to be Bacteremic with 11/17 Blc Cx growing mixed cultures Staph epi and Enterococcus faecalis and started on IV Vanco. Repeat cultures from 11/18 reveal NGTD and therefore was cleared by ID for listing.     He appears adequately supported on IABP at 1:1, electrically quiescent on oral Amiodarone. Cr is improving with holding diuretics. Labs otherwise notable for worsening thrombocytopenia. Awaiting suitable donor. GM + rods (Cutibacterium) in blood culture on 11/30 (reported on 12/4), restarted on vancomycin, and status 7, repeat cultures negative x48 hours (12/6),  now status 2 again.         Cardiac Studies  11/21/23 TTE: limited unable to rule out endocarditis, technically difficult study  11/1123 TTE: LVEF 22% (global), LV thrombus, normal RV size and function, mild MR, trace TR  11/1/23  LHC showed pLAD 70 % stenosis in the ostium portion of the segment and 100 % in-stent restenosis and in mRCA, 100 % stenosis.   11/1/23 RHC; RA 23 Vw 24, PA 52/33/40, PCWP 30 Vw 33, AO 85/66/73, PA sat 54.4%, CO/CI (F) 2.96/1.44, IABP was placed during the cath through R common femoral artery.   11/1/23 TTE: LVIDd 6.4cm , LVEF 32%, regional WMA, grade II DD,  TAPSE 1.7cm, mld MR, trace TR,

## 2023-12-11 NOTE — PROGRESS NOTE ADULT - ATTENDING COMMENTS
57yo M with ischemic HFrEF, ADHF and cardiogenic shock awaiting heart transplant (status 2)  Neuro: no deficits, prn atarax for anxiety  Pulm: sating well on RA, prn albuterol and symbicort  CV: IABP 1:1, coreg, hydral, isordil  Renal: Cr under 1, no issues  GI: DASH diet  Heme: heparin gtt for LV thrombus and IABP  ID: no active issues  Endo: BG controlled  Derm: Hepatitis immunization induced rash, on topical steroids  Lines: IABP (11/20) 59yo M with ischemic HFrEF, ADHF and cardiogenic shock awaiting heart transplant (status 2)  Neuro: no deficits, prn atarax for anxiety  Pulm: sating well on RA, prn albuterol and symbicort  CV: IABP 1:1, coreg, hydral, isordil  Renal: Cr under 1, no issues  GI: DASH diet  Heme: heparin gtt for LV thrombus and IABP  ID: no active issues  Endo: BG controlled  Derm: Hepatitis immunization induced rash, on topical steroids  Lines: IABP (11/20)

## 2023-12-11 NOTE — PROGRESS NOTE ADULT - ASSESSMENT
====================ASSESSMENT ==============  58 male with HTN, CAD (s/p PCI 2008), HFrEF, CVA 2018, and T2DM presenting with chest pressure and unknown tachycardia that was shocked x1, [facility] 11/1 found to have in-stent restenosis of pLAD and  of RCA with elevated RA and PA pressures and severely decreased. Admitted to CICU for management of cardiogenic shock and ADHF requiring IABP 11/1 -11/7, with hospital course c/b vfib arrest requiring reinsertion of IABP. Not recommended for ATs per HF. Currently listed for transplant status 2.    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 AT evaluation launched 11/10, he is ABO A. He was discussed during TSC on 11/16 and was approved for listing, however was found to be Bacteremic with 11/17 Blc Cx growing mixed cultures Staph epi and Enterococcus faecalis and started on IV Vanco. Repeat cultures from 11/18 reveal NGTD and therefore was cleared by ID for listing.     He appears adequately supported on IABP at 1:1, electrically quiescent on oral Amiodarone. Cr is improving with holding diuretics. Labs otherwise notable for worsening thrombocytopenia. Awaiting suitable donor. Now with GM + rods in blood culture on 11/30 (reported on 12/4), restarted on vancomycin, and status 7, repeat cultures now negative x48 hours (12/6),  now status 2 again.       ====================== NEUROLOGY=====================  Anxiety  - No Seroquel/antipsychotics since vfib arrest and prolonged QTC  - Psych Eval, recommended SSRI, but pt. refused   - Psych recommending atarax PRN  - Continue to monitor mental status    PT/Conditioning  - Continue band exercised while on bedrest s/t IABP    ==================== RESPIRATORY======================  Acute Hypoxemic Respiratory Failure  - s/p x2 intubations for cardiogenic pulm edema and the in setting of cardiac arrest, resolved - extubated 11/10  - Currently maintaining >95% sats on room air  - Continue incentive spirometry and monitoring of sp02    Asthma  - c/w albuterol, symbicort and spiriva  - On trelegy at home  - Continue to monitor SpO2 with goal >94%    ====================CARDIOVASCULAR==================  Vfib arrest i/s/o ischemia  - Lido gtt off 11/13  - PO Amio load - total of 5g per EP complete 11/17, continue PO amio  - Amio held for rising LFTs, continue to hold  - Keep K > 4, Mag > 2.2     Cardiogenic shock requiring IABP (11/1- 11/7, 11/9-)  - Likely 2/2 NSTEMI and ADHF  - 11/1 LHC: pLAD 100 % in-stent restenosis & mRCA, 100 %. PCWP 30. IABP placed.  - 11/1 TTE: LV dilated. EF 32 %. Regional WMAs present, mod (grade 2) LV diastolic dysfunction  - 11/2 TTE: EF 22% and + LV thrombus  - IABP swapped 11/20 to RFA, continue 1:1 support  - Off Milrinone gtt @ 7:30 am 11/11  - James d/c'd due to elevated K levels  - c/w coreg 25 BID for GDMT  - HIT and HAIDER sent (12/3) as per HF   - UNOS status 2 as of 12/6  - 12/5 - reduced hydralazine 100 TID to 75 TID and ISD 40 TID to 30 TID for AL reduction    NSTEMI iso stent re-occlusion of pLAD and 100%  of RCA  - EKG on admission w/ LBBB  - DAPT: c/w ASA, Brilinta d/c'd per transplant w/u  - c/w lipitor 80  - cMR deferred given necessity of IABP  - CT sx not recommending CABG, undergoing AT eval    LV thrombus  - c/w heparin gtt w/ therapeutic PTT    ===================== RENAL =========================  Non-oliguric ARIC, resolved   - Baseline Cr: 1-1.22  - Renal US: no evidence of renal artery stenosis  - Trend BMP, lytes daily, replace as needed  - Continue Strict I/Os, avoid nephrotoxins    Mild Hyponatremia/Hyperkalemia iso ARIC  - Continue fluid restriction   - Urea powder d/c'd per renal  - Trend daily  - Continue monitoring urine output, lytes, SCr/ BUN  - Replete lytes prn with goal K >4 and Mg >2    =============== GASTROINTESTINAL===================  Constipation/ileus, resolved  - regular diet  - bowel reg prn    ===================ENDO====================  Type 2 DM  - A1c 8.3   - Continue lantus, premeal, low ISS  - continue FS    ===================HEMATOLOGIC/ONC ===================  - H/H & plts stable  - Monitor H/H and plts  - VTE PPX: heparin gtt    ==================INFECTIOUS DISEASE================  # Positive blood culture, resolved  - Repeat BCx drawn 11/30 for leukocytosis to 12k - positive on 12/4, G+ rods in anaerobic bottle, suspect contaminant  - Repeat cultures x2 sent 12/4 per transplant ID recs - no growth to date  - Vancomycin by trough (12/4-12/6)  - Final microbial ID: Cutibacterium acnes, per ID this is likely to be a skin contaminant, vanc dc'd.   - Dental eval 12/7 to rule out infectious etiology that would have led to bacteremia, no significant findings on exam.     # Enterococcus faecalis bacteremia, resolved  - BCx 11/17+ for enterococcus faecalis x2, Staph epi x1 (likely contaminant)- pan sensitive   - Urine cx 11/18 + enterococcus faecalis  - BCx 11/18 no growth   - IABP site swapped to RFA 11/20  - s/p Vancomycin 1g q12h (11/18-11/27)  - CT A/P negative for infectious pathology    # COVID, resolved  - Off airborne precautions 11/11    # Pre-transplant ID w/u   - Trend ID recs for serologies   - Colonoscopy 11/17 - normal   - Chest CT 11/17 - improved LLL aeration  - s/p immunizations    ==================INFECTIOUS DISEASE================  # Upper Extremity Rash  - Patient developed bilateral upper extremity rash after receiving Hepatitis A & B immunizations on 11/24  - Dermatology consulted 12/5 - not likely to be related to vanco, can continue per ID recs  - Recommend clobetasol 0.05% BID to affected areas   - RVP repeated to rule out viral etiology, negative   ====================ASSESSMENT ==============  58 male with HTN, CAD (s/p PCI 2008), HFrEF, CVA 2018, and T2DM presenting with chest pressure and unknown tachycardia that was shocked x1, [facility] 11/1 found to have in-stent restenosis of pLAD and  of RCA with elevated RA and PA pressures and severely decreased. Admitted to CICU for management of cardiogenic shock and ADHF requiring IABP 11/1 -11/7, with hospital course c/b vfib arrest requiring reinsertion of IABP. Not recommended for ATs per HF. Currently listed for transplant status 2.    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 AT evaluation launched 11/10, he is ABO A. He was discussed during TSC on 11/16 and was approved for listing, however was found to be Bacteremic with 11/17 Blc Cx growing mixed cultures Staph epi and Enterococcus faecalis and started on IV Vanco. Repeat cultures from 11/18 reveal NGTD and therefore was cleared by ID for listing.     He appears adequately supported on IABP at 1:1, electrically quiescent on oral Amiodarone. Cr is improving with holding diuretics. Labs otherwise notable for worsening thrombocytopenia. Awaiting suitable donor. Now with GM + rods in blood culture on 11/30 (reported on 12/4), restarted on vancomycin, and status 7, repeat cultures now negative x48 hours (12/6),  now status 2 again.       ====================== NEUROLOGY=====================  Anxiety  - No Seroquel/antipsychotics since vfib arrest and prolonged QTC  - Psych Eval, recommended SSRI, but pt. refused   - Psych recommending atarax PRN  - Continue to monitor mental status    PT/Conditioning  - Continue band exercised while on bedrest s/t IABP    ==================== RESPIRATORY======================  Acute Hypoxemic Respiratory Failure  - s/p x2 intubations for cardiogenic pulm edema and the in setting of cardiac arrest, resolved - extubated 11/10  - Currently maintaining >95% sats on room air  - Continue incentive spirometry and monitoring of sp02    Asthma  - c/w albuterol, symbicort and spiriva  - On trelegy at home  - Continue to monitor SpO2 with goal >94%    ====================CARDIOVASCULAR==================  Vfib arrest i/s/o ischemia  - Lido gtt off 11/13  - PO Amio load - total of 5g per EP complete 11/17, continue PO amio  - Amio held for rising LFTs, continue to hold  - Keep K > 4, Mag > 2.2     Cardiogenic shock requiring IABP (11/1- 11/7, 11/9-)  - Likely 2/2 NSTEMI and ADHF  - 11/1 LHC: pLAD 100 % in-stent restenosis & mRCA, 100 %. PCWP 30. IABP placed.  - 11/1 TTE: LV dilated. EF 32 %. Regional WMAs present, mod (grade 2) LV diastolic dysfunction  - 11/2 TTE: EF 22% and + LV thrombus  - IABP swapped 11/20 to RFA, continue 1:1 support  - Off Milrinone gtt @ 7:30 am 11/11  - James d/c'd due to elevated K levels  - c/w coreg 25 BID for GDMT  - HIT and HAIDER sent (12/3) as per HF   - UNOS status 2 as of 12/6  - 12/5 - reduced hydralazine 100 TID to 75 TID and ISD 40 TID to 30 TID for AL reduction    NSTEMI iso stent re-occlusion of pLAD and 100%  of RCA  - EKG on admission w/ LBBB  - DAPT: c/w ASA, Brilinta d/c'd per transplant w/u  - c/w lipitor 80  - cMR deferred given necessity of IABP  - CT sx not recommending CABG, undergoing AT eval    LV thrombus  - c/w heparin gtt w/ therapeutic PTT    ===================== RENAL =========================  Non-oliguric ARIC, resolved   - Baseline Cr: 1-1.22  - Renal US: no evidence of renal artery stenosis  - Trend BMP, lytes daily, replace as needed  - Continue Strict I/Os, avoid nephrotoxins    Mild Hyponatremia/Hyperkalemia iso ARIC  - Continue fluid restriction   - Urea powder d/c'd per renal  - Trend daily  - Continue monitoring urine output, lytes, SCr/ BUN  - Replete lytes prn with goal K >4 and Mg >2    =============== GASTROINTESTINAL===================  Constipation/ileus, resolved  - regular diet  - bowel reg prn    ===================ENDO====================  Type 2 DM  - A1c 8.3   - Continue lantus, premeal, low ISS  - continue FS    ===================HEMATOLOGIC/ONC ===================  - H/H & plts stable  - Monitor H/H and plts  - VTE PPX: heparin gtt    ==================INFECTIOUS DISEASE================  # Positive blood culture, resolved  - Repeat BCx drawn 11/30 for leukocytosis to 12k - positive on 12/4, G+ rods in anaerobic bottle, suspect contaminant  - Repeat cultures x2 sent 12/4 per transplant ID recs - no growth to date  - Vancomycin by trough (12/4-12/6)  - Final microbial ID: Cutibacterium acnes, per ID this is likely to be a skin contaminant, vanc dc'd.   - Dental eval 12/7 to rule out infectious etiology that would have led to bacteremia, no significant findings on exam.     # Enterococcus faecalis bacteremia, resolved  - BCx 11/17+ for enterococcus faecalis x2, Staph epi x1 (likely contaminant)- pan sensitive   - Urine cx 11/18 + enterococcus faecalis  - BCx 11/18 no growth   - IABP site swapped to RFA 11/20  - s/p Vancomycin 1g q12h (11/18-11/27)  - CT A/P negative for infectious pathology    # COVID, resolved  - Off airborne precautions 11/11    # Pre-transplant ID w/u   - Trend ID recs for serologies   - Colonoscopy 11/17 - normal   - Chest CT 11/17 - improved LLL aeration  - s/p immunizations    ==================INFECTIOUS DISEASE================  # Upper Extremity Rash  - Patient developed bilateral upper extremity rash after receiving Hepatitis A & B immunizations on 11/24  - Dermatology consulted 12/5 - not likely to be related to vanco, can continue per ID recs  - Recommend clobetasol 0.05% BID to affected areas   - RVP repeated to rule out viral etiology, negative   ====================ASSESSMENT ==============  58 male with HTN, CAD (s/p PCI 2008), HFrEF, CVA 2018, and T2DM presenting with chest pressure and unknown tachycardia that was shocked x1, [facility] 11/1 found to have in-stent restenosis of pLAD and  of RCA with elevated RA and PA pressures and severely decreased. Admitted to CICU for management of cardiogenic shock and ADHF requiring IABP 11/1 -11/7, with hospital course c/b vfib arrest requiring reinsertion of IABP. Not recommended for ATs per HF. Currently listed for transplant status 2.    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 AT evaluation launched 11/10, he is ABO A. He was discussed during TSC on 11/16 and was approved for listing, however was found to be Bacteremic with 11/17 Blc Cx growing mixed cultures Staph epi and Enterococcus faecalis and started on IV Vanco. Repeat cultures from 11/18 reveal NGTD and therefore was cleared by ID for listing.     He appears adequately supported on IABP at 1:1, electrically quiescent on oral Amiodarone. Cr is improving with holding diuretics. Labs otherwise notable for worsening thrombocytopenia. Awaiting suitable donor. Now with GM + rods in blood culture on 11/30 (reported on 12/4), restarted on vancomycin, and status 7, repeat cultures now negative x48 hours (12/6),  now status 2 again.       ====================== NEUROLOGY=====================  Anxiety  - No Seroquel/antipsychotics since vfib arrest and prolonged QTC  - Psych Eval, recommended SSRI, but pt. refused   - Psych recommending atarax PRN  - Continue to monitor mental status    PT/Conditioning  - Continue band exercised while on bedrest s/t IABP    ==================== RESPIRATORY======================  Acute Hypoxemic Respiratory Failure  - s/p x2 intubations for cardiogenic pulm edema and the in setting of cardiac arrest, resolved - extubated 11/10  - Currently maintaining >95% sats on room air  - Continue incentive spirometry and monitoring of sp02    Asthma  - c/w albuterol, symbicort and spiriva  - On trelegy at home  - Continue to monitor SpO2 with goal >94%    ====================CARDIOVASCULAR==================  Vfib arrest i/s/o ischemia  - Lido gtt off 11/13  - PO Amio load - total of 5g per EP complete 11/17, continue PO amio  - Amio held for rising LFTs, continue to hold  - Keep K > 4, Mag > 2.2     Cardiogenic shock requiring IABP (11/1- 11/7, 11/9-)  - Likely 2/2 NSTEMI and ADHF  - 11/1 LHC: pLAD 100 % in-stent restenosis & mRCA, 100 %. PCWP 30. IABP placed.  - 11/1 TTE: LV dilated. EF 32 %. Regional WMAs present, mod (grade 2) LV diastolic dysfunction  - 11/2 TTE: EF 22% and + LV thrombus  - IABP swapped 11/20 to RFA, continue 1:1 support  - Off Milrinone gtt @ 7:30 am 11/11  - James d/c'd due to elevated K levels  - c/w coreg 25 BID for GDMT  - HIT and HAIDER sent (12/3) as per HF   - UNOS status 2 as of 12/6  - 12/5 - reduced hydralazine 100 TID to 75 TID and ISD 40 TID to 30 TID for AL reduction    NSTEMI iso stent re-occlusion of pLAD and 100%  of RCA  - EKG on admission w/ LBBB  - DAPT: c/w ASA, Brilinta d/c'd per transplant w/u  - c/w lipitor 80  - cMR deferred given necessity of IABP  - CT sx not recommending CABG, undergoing AT eval    LV thrombus  - c/w heparin gtt w/ therapeutic PTT    ===================== RENAL =========================  Non-oliguric ARIC, resolved   - Baseline Cr: 1-1.22  - Renal US: no evidence of renal artery stenosis  - Trend BMP, lytes daily, replace as needed  - Continue Strict I/Os, avoid nephrotoxins    Mild Hyponatremia/Hyperkalemia iso ARIC  - Continue fluid restriction   - Urea powder d/c'd per renal  - Trend daily  - Continue monitoring urine output, lytes, SCr/ BUN  - Replete lytes prn with goal K >4 and Mg >2    =============== GASTROINTESTINAL===================  Constipation/ileus, resolved  - regular diet  - bowel reg prn    ===================ENDO====================  Type 2 DM  - A1c 8.3   - Continue lantus, premeal, low ISS  - continue FS    ===================HEMATOLOGIC/ONC ===================  - H/H & plts stable  - Monitor H/H and plts  - VTE PPX: heparin gtt    ==================INFECTIOUS DISEASE================  # Positive blood culture, resolved  - Repeat BCx drawn 11/30 for leukocytosis to 12k - positive on 12/4, G+ rods in anaerobic bottle, suspect contaminant  - Repeat cultures x2 sent 12/4 per transplant ID recs - no growth to date  - Vancomycin by trough (12/4-12/6)  - Final microbial ID: Cutibacterium acnes, per ID this is likely to be a skin contaminant, vanc dc'd.   - Dental eval 12/7 to rule out infectious etiology that would have led to bacteremia, no significant findings on exam.     # Enterococcus faecalis bacteremia, resolved  - BCx 11/17+ for enterococcus faecalis x2, Staph epi x1 (likely contaminant)- pan sensitive   - Urine cx 11/18 + enterococcus faecalis  - BCx 11/18 no growth   - IABP site swapped to RFA 11/20  - s/p Vancomycin 1g q12h (11/18-11/27)  - CT A/P negative for infectious pathology    # COVID, resolved  - Off airborne precautions 11/11    # Pre-transplant ID w/u   - Trend ID recs for serologies   - Colonoscopy 11/17 - normal   - Chest CT 11/17 - improved LLL aeration  - s/p immunizations    ==================DERMATOLOGY================  # Upper Extremity Rash  - Patient developed bilateral upper extremity rash after receiving Hepatitis A & B immunizations on 11/24  - Dermatology consulted 12/5 - not likely to be related to vanco, can continue per ID recs  - Recommend clobetasol 0.05% BID to affected areas   - RVP repeated to rule out viral etiology, negative   ====================ASSESSMENT ==============  58 male with HTN, CAD (s/p PCI 2008), HFrEF, CVA 2018, and T2DM presenting with chest pressure and unknown tachycardia that was shocked x1, [facility] 11/1 found to have in-stent restenosis of pLAD and  of RCA with elevated RA and PA pressures and severely decreased. Admitted to CICU for management of cardiogenic shock and ADHF requiring IABP 11/1 -11/7, with hospital course c/b vfib arrest requiring reinsertion of IABP. Not recommended for ATs per HF. Currently listed for transplant status 2.    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 AT evaluation launched 11/10, he is ABO A. He was discussed during TSC on 11/16 and was approved for listing, however was found to be Bacteremic with 11/17 Blc Cx growing mixed cultures Staph epi and Enterococcus faecalis and started on IV Vanco. Repeat cultures from 11/18 reveal NGTD and therefore was cleared by ID for listing.     He appears adequately supported on IABP at 1:1, electrically quiescent on oral Amiodarone. Cr is improving with holding diuretics. Labs otherwise notable for worsening thrombocytopenia. Awaiting suitable donor. Now with GM + rods in blood culture on 11/30 (reported on 12/4), restarted on vancomycin, and status 7, repeat cultures now negative x48 hours (12/6),  now status 2 again.       ====================== NEUROLOGY=====================  Anxiety  - No Seroquel/antipsychotics since vfib arrest and prolonged QTC  - Psych Eval, recommended SSRI, but pt. refused   - Psych recommending atarax PRN  - Continue to monitor mental status    PT/Conditioning  - Continue band exercised while on bedrest s/t IABP    ==================== RESPIRATORY======================  Acute Hypoxemic Respiratory Failure  - s/p x2 intubations for cardiogenic pulm edema and the in setting of cardiac arrest, resolved - extubated 11/10  - Currently maintaining >95% sats on room air  - Continue incentive spirometry and monitoring of sp02    Asthma  - c/w albuterol, symbicort and spiriva  - On trelegy at home  - Continue to monitor SpO2 with goal >94%    ====================CARDIOVASCULAR==================  Vfib arrest i/s/o ischemia  - Lido gtt off 11/13  - PO Amio load - total of 5g per EP complete 11/17, continue PO amio  - Amio held for rising LFTs, continue to hold  - Keep K > 4, Mag > 2.2     Cardiogenic shock requiring IABP (11/1- 11/7, 11/9-)  - Likely 2/2 NSTEMI and ADHF  - 11/1 LHC: pLAD 100 % in-stent restenosis & mRCA, 100 %. PCWP 30. IABP placed.  - 11/1 TTE: LV dilated. EF 32 %. Regional WMAs present, mod (grade 2) LV diastolic dysfunction  - 11/2 TTE: EF 22% and + LV thrombus  - IABP swapped 11/20 to RFA, continue 1:1 support  - Off Milrinone gtt @ 7:30 am 11/11  - James d/c'd due to elevated K levels  - c/w coreg 25 BID for GDMT  - HIT and HAIDER sent (12/3) as per HF   - UNOS status 2 as of 12/6  - 12/5 - reduced hydralazine 100 TID to 75 TID and ISD 40 TID to 30 TID for AL reduction    NSTEMI iso stent re-occlusion of pLAD and 100%  of RCA  - EKG on admission w/ LBBB  - DAPT: c/w ASA, Brilinta d/c'd per transplant w/u  - c/w lipitor 80  - cMR deferred given necessity of IABP  - CT sx not recommending CABG, undergoing AT eval    LV thrombus  - c/w heparin gtt w/ therapeutic PTT    ===================== RENAL =========================  Non-oliguric ARIC, resolved   - Baseline Cr: 1-1.22  - Renal US: no evidence of renal artery stenosis  - Trend BMP, lytes daily, replace as needed  - Continue Strict I/Os, avoid nephrotoxins    Mild Hyponatremia/Hyperkalemia iso ARIC  - Continue fluid restriction   - Urea powder d/c'd per renal  - Trend daily  - Continue monitoring urine output, lytes, SCr/ BUN  - Replete lytes prn with goal K >4 and Mg >2    =============== GASTROINTESTINAL===================  Constipation/ileus, resolved  - regular diet  - bowel reg prn    ===================ENDO====================  Type 2 DM  - A1c 8.3   - Continue lantus, premeal, low ISS  - continue FS    ===================HEMATOLOGIC/ONC ===================  - H/H & plts stable  - Monitor H/H and plts  - VTE PPX: heparin gtt    ==================INFECTIOUS DISEASE================  # Positive blood culture, resolved  - Repeat BCx drawn 11/30 for leukocytosis to 12k - positive on 12/4, G+ rods in anaerobic bottle, suspect contaminant  - Repeat cultures x2 sent 12/4 per transplant ID recs - no growth to date  - Vancomycin by trough (12/4-12/6)  - Final microbial ID: Cutibacterium acnes, per ID this is likely to be a skin contaminant, vanc dc'd.   - Dental eval 12/7 to rule out infectious etiology that would have led to bacteremia, no significant findings on exam.     # Enterococcus faecalis bacteremia, resolved  - BCx 11/17+ for enterococcus faecalis x2, Staph epi x1 (likely contaminant)- pan sensitive   - Urine cx 11/18 + enterococcus faecalis  - BCx 11/18 no growth   - IABP site swapped to RFA 11/20  - s/p Vancomycin 1g q12h (11/18-11/27)  - CT A/P negative for infectious pathology    # COVID, resolved  - Off airborne precautions 11/11    # Pre-transplant ID w/u   - Trend ID recs for serologies   - Colonoscopy 11/17 - normal   - Chest CT 11/17 - improved LLL aeration  - s/p immunizations    ==================DERMATOLOGY================  # Upper Extremity Rash  - Patient developed bilateral upper extremity rash after receiving Hepatitis A & B immunizations on 11/24  - Dermatology consulted 12/5 - not likely to be related to vanco, can continue per ID recs  - Recommend clobetasol 0.05% BID to affected areas   - RVP repeated to rule out viral etiology, negative

## 2023-12-11 NOTE — PROGRESS NOTE ADULT - PROBLEM SELECTOR PLAN 3
- PMVT into VF arrest 11/8, 3 rounds with shocks  -currently on amio  -monitor LFTs  - IABP support as above  - seen by EP

## 2023-12-12 LAB
ALBUMIN SERPL ELPH-MCNC: 4 G/DL — SIGNIFICANT CHANGE UP (ref 3.3–5)
ALBUMIN SERPL ELPH-MCNC: 4 G/DL — SIGNIFICANT CHANGE UP (ref 3.3–5)
ALP SERPL-CCNC: 62 U/L — SIGNIFICANT CHANGE UP (ref 40–120)
ALP SERPL-CCNC: 62 U/L — SIGNIFICANT CHANGE UP (ref 40–120)
ALT FLD-CCNC: 147 U/L — HIGH (ref 10–45)
ALT FLD-CCNC: 147 U/L — HIGH (ref 10–45)
ANION GAP SERPL CALC-SCNC: 11 MMOL/L — SIGNIFICANT CHANGE UP (ref 5–17)
ANION GAP SERPL CALC-SCNC: 11 MMOL/L — SIGNIFICANT CHANGE UP (ref 5–17)
APTT BLD: 59.6 SEC — HIGH (ref 24.5–35.6)
APTT BLD: 59.6 SEC — HIGH (ref 24.5–35.6)
AST SERPL-CCNC: 44 U/L — HIGH (ref 10–40)
AST SERPL-CCNC: 44 U/L — HIGH (ref 10–40)
BASOPHILS # BLD AUTO: 0.03 K/UL — SIGNIFICANT CHANGE UP (ref 0–0.2)
BASOPHILS # BLD AUTO: 0.03 K/UL — SIGNIFICANT CHANGE UP (ref 0–0.2)
BASOPHILS NFR BLD AUTO: 0.3 % — SIGNIFICANT CHANGE UP (ref 0–2)
BASOPHILS NFR BLD AUTO: 0.3 % — SIGNIFICANT CHANGE UP (ref 0–2)
BILIRUB SERPL-MCNC: 0.3 MG/DL — SIGNIFICANT CHANGE UP (ref 0.2–1.2)
BILIRUB SERPL-MCNC: 0.3 MG/DL — SIGNIFICANT CHANGE UP (ref 0.2–1.2)
BUN SERPL-MCNC: 29 MG/DL — HIGH (ref 7–23)
BUN SERPL-MCNC: 29 MG/DL — HIGH (ref 7–23)
CALCIUM SERPL-MCNC: 10.1 MG/DL — SIGNIFICANT CHANGE UP (ref 8.4–10.5)
CALCIUM SERPL-MCNC: 10.1 MG/DL — SIGNIFICANT CHANGE UP (ref 8.4–10.5)
CHLORIDE SERPL-SCNC: 105 MMOL/L — SIGNIFICANT CHANGE UP (ref 96–108)
CHLORIDE SERPL-SCNC: 105 MMOL/L — SIGNIFICANT CHANGE UP (ref 96–108)
CO2 SERPL-SCNC: 22 MMOL/L — SIGNIFICANT CHANGE UP (ref 22–31)
CO2 SERPL-SCNC: 22 MMOL/L — SIGNIFICANT CHANGE UP (ref 22–31)
CREAT SERPL-MCNC: 1.15 MG/DL — SIGNIFICANT CHANGE UP (ref 0.5–1.3)
CREAT SERPL-MCNC: 1.15 MG/DL — SIGNIFICANT CHANGE UP (ref 0.5–1.3)
EGFR: 73 ML/MIN/1.73M2 — SIGNIFICANT CHANGE UP
EGFR: 73 ML/MIN/1.73M2 — SIGNIFICANT CHANGE UP
EOSINOPHIL # BLD AUTO: 0.91 K/UL — HIGH (ref 0–0.5)
EOSINOPHIL # BLD AUTO: 0.91 K/UL — HIGH (ref 0–0.5)
EOSINOPHIL NFR BLD AUTO: 9.4 % — HIGH (ref 0–6)
EOSINOPHIL NFR BLD AUTO: 9.4 % — HIGH (ref 0–6)
GLUCOSE BLDC GLUCOMTR-MCNC: 124 MG/DL — HIGH (ref 70–99)
GLUCOSE BLDC GLUCOMTR-MCNC: 124 MG/DL — HIGH (ref 70–99)
GLUCOSE BLDC GLUCOMTR-MCNC: 159 MG/DL — HIGH (ref 70–99)
GLUCOSE BLDC GLUCOMTR-MCNC: 159 MG/DL — HIGH (ref 70–99)
GLUCOSE BLDC GLUCOMTR-MCNC: 162 MG/DL — HIGH (ref 70–99)
GLUCOSE BLDC GLUCOMTR-MCNC: 162 MG/DL — HIGH (ref 70–99)
GLUCOSE BLDC GLUCOMTR-MCNC: 246 MG/DL — HIGH (ref 70–99)
GLUCOSE BLDC GLUCOMTR-MCNC: 246 MG/DL — HIGH (ref 70–99)
GLUCOSE SERPL-MCNC: 129 MG/DL — HIGH (ref 70–99)
GLUCOSE SERPL-MCNC: 129 MG/DL — HIGH (ref 70–99)
HCT VFR BLD CALC: 37.4 % — LOW (ref 39–50)
HCT VFR BLD CALC: 37.4 % — LOW (ref 39–50)
HGB BLD-MCNC: 12.2 G/DL — LOW (ref 13–17)
HGB BLD-MCNC: 12.2 G/DL — LOW (ref 13–17)
IMM GRANULOCYTES NFR BLD AUTO: 0.5 % — SIGNIFICANT CHANGE UP (ref 0–0.9)
IMM GRANULOCYTES NFR BLD AUTO: 0.5 % — SIGNIFICANT CHANGE UP (ref 0–0.9)
INR BLD: 1.08 RATIO — SIGNIFICANT CHANGE UP (ref 0.85–1.18)
INR BLD: 1.08 RATIO — SIGNIFICANT CHANGE UP (ref 0.85–1.18)
LACTATE SERPL-SCNC: 1.6 MMOL/L — SIGNIFICANT CHANGE UP (ref 0.5–2)
LACTATE SERPL-SCNC: 1.6 MMOL/L — SIGNIFICANT CHANGE UP (ref 0.5–2)
LYMPHOCYTES # BLD AUTO: 1.54 K/UL — SIGNIFICANT CHANGE UP (ref 1–3.3)
LYMPHOCYTES # BLD AUTO: 1.54 K/UL — SIGNIFICANT CHANGE UP (ref 1–3.3)
LYMPHOCYTES # BLD AUTO: 15.9 % — SIGNIFICANT CHANGE UP (ref 13–44)
LYMPHOCYTES # BLD AUTO: 15.9 % — SIGNIFICANT CHANGE UP (ref 13–44)
MAGNESIUM SERPL-MCNC: 1.8 MG/DL — SIGNIFICANT CHANGE UP (ref 1.6–2.6)
MAGNESIUM SERPL-MCNC: 1.8 MG/DL — SIGNIFICANT CHANGE UP (ref 1.6–2.6)
MCHC RBC-ENTMCNC: 30.1 PG — SIGNIFICANT CHANGE UP (ref 27–34)
MCHC RBC-ENTMCNC: 30.1 PG — SIGNIFICANT CHANGE UP (ref 27–34)
MCHC RBC-ENTMCNC: 32.6 GM/DL — SIGNIFICANT CHANGE UP (ref 32–36)
MCHC RBC-ENTMCNC: 32.6 GM/DL — SIGNIFICANT CHANGE UP (ref 32–36)
MCV RBC AUTO: 92.3 FL — SIGNIFICANT CHANGE UP (ref 80–100)
MCV RBC AUTO: 92.3 FL — SIGNIFICANT CHANGE UP (ref 80–100)
MONOCYTES # BLD AUTO: 0.62 K/UL — SIGNIFICANT CHANGE UP (ref 0–0.9)
MONOCYTES # BLD AUTO: 0.62 K/UL — SIGNIFICANT CHANGE UP (ref 0–0.9)
MONOCYTES NFR BLD AUTO: 6.4 % — SIGNIFICANT CHANGE UP (ref 2–14)
MONOCYTES NFR BLD AUTO: 6.4 % — SIGNIFICANT CHANGE UP (ref 2–14)
NEUTROPHILS # BLD AUTO: 6.51 K/UL — SIGNIFICANT CHANGE UP (ref 1.8–7.4)
NEUTROPHILS # BLD AUTO: 6.51 K/UL — SIGNIFICANT CHANGE UP (ref 1.8–7.4)
NEUTROPHILS NFR BLD AUTO: 67.5 % — SIGNIFICANT CHANGE UP (ref 43–77)
NEUTROPHILS NFR BLD AUTO: 67.5 % — SIGNIFICANT CHANGE UP (ref 43–77)
NRBC # BLD: 0 /100 WBCS — SIGNIFICANT CHANGE UP (ref 0–0)
NRBC # BLD: 0 /100 WBCS — SIGNIFICANT CHANGE UP (ref 0–0)
PHOSPHATE SERPL-MCNC: 3.7 MG/DL — SIGNIFICANT CHANGE UP (ref 2.5–4.5)
PHOSPHATE SERPL-MCNC: 3.7 MG/DL — SIGNIFICANT CHANGE UP (ref 2.5–4.5)
PLATELET # BLD AUTO: 147 K/UL — LOW (ref 150–400)
PLATELET # BLD AUTO: 147 K/UL — LOW (ref 150–400)
POTASSIUM SERPL-MCNC: 4.7 MMOL/L — SIGNIFICANT CHANGE UP (ref 3.5–5.3)
POTASSIUM SERPL-MCNC: 4.7 MMOL/L — SIGNIFICANT CHANGE UP (ref 3.5–5.3)
POTASSIUM SERPL-SCNC: 4.7 MMOL/L — SIGNIFICANT CHANGE UP (ref 3.5–5.3)
POTASSIUM SERPL-SCNC: 4.7 MMOL/L — SIGNIFICANT CHANGE UP (ref 3.5–5.3)
PROT SERPL-MCNC: 7.2 G/DL — SIGNIFICANT CHANGE UP (ref 6–8.3)
PROT SERPL-MCNC: 7.2 G/DL — SIGNIFICANT CHANGE UP (ref 6–8.3)
PROTHROM AB SERPL-ACNC: 11.9 SEC — SIGNIFICANT CHANGE UP (ref 9.5–13)
PROTHROM AB SERPL-ACNC: 11.9 SEC — SIGNIFICANT CHANGE UP (ref 9.5–13)
RBC # BLD: 4.05 M/UL — LOW (ref 4.2–5.8)
RBC # BLD: 4.05 M/UL — LOW (ref 4.2–5.8)
RBC # FLD: 15.9 % — HIGH (ref 10.3–14.5)
RBC # FLD: 15.9 % — HIGH (ref 10.3–14.5)
SODIUM SERPL-SCNC: 138 MMOL/L — SIGNIFICANT CHANGE UP (ref 135–145)
SODIUM SERPL-SCNC: 138 MMOL/L — SIGNIFICANT CHANGE UP (ref 135–145)
UFH PPP CHRO-ACNC: 0.41 IU/ML — SIGNIFICANT CHANGE UP (ref 0.3–0.7)
UFH PPP CHRO-ACNC: 0.41 IU/ML — SIGNIFICANT CHANGE UP (ref 0.3–0.7)
WBC # BLD: 9.66 K/UL — SIGNIFICANT CHANGE UP (ref 3.8–10.5)
WBC # BLD: 9.66 K/UL — SIGNIFICANT CHANGE UP (ref 3.8–10.5)
WBC # FLD AUTO: 9.66 K/UL — SIGNIFICANT CHANGE UP (ref 3.8–10.5)
WBC # FLD AUTO: 9.66 K/UL — SIGNIFICANT CHANGE UP (ref 3.8–10.5)

## 2023-12-12 PROCEDURE — 93010 ELECTROCARDIOGRAM REPORT: CPT

## 2023-12-12 PROCEDURE — 99232 SBSQ HOSP IP/OBS MODERATE 35: CPT

## 2023-12-12 PROCEDURE — 99292 CRITICAL CARE ADDL 30 MIN: CPT

## 2023-12-12 PROCEDURE — 71045 X-RAY EXAM CHEST 1 VIEW: CPT | Mod: 26

## 2023-12-12 PROCEDURE — 99291 CRITICAL CARE FIRST HOUR: CPT | Mod: 25

## 2023-12-12 RX ORDER — MAGNESIUM SULFATE 500 MG/ML
2 VIAL (ML) INJECTION ONCE
Refills: 0 | Status: COMPLETED | OUTPATIENT
Start: 2023-12-12 | End: 2023-12-12

## 2023-12-12 RX ADMIN — ISOSORBIDE DINITRATE 30 MILLIGRAM(S): 5 TABLET ORAL at 17:23

## 2023-12-12 RX ADMIN — Medication 1: at 08:47

## 2023-12-12 RX ADMIN — Medication 9 UNIT(S): at 12:26

## 2023-12-12 RX ADMIN — HEPARIN SODIUM 14 UNIT(S)/HR: 5000 INJECTION INTRAVENOUS; SUBCUTANEOUS at 05:07

## 2023-12-12 RX ADMIN — Medication 9 UNIT(S): at 08:47

## 2023-12-12 RX ADMIN — CARVEDILOL PHOSPHATE 25 MILLIGRAM(S): 80 CAPSULE, EXTENDED RELEASE ORAL at 05:05

## 2023-12-12 RX ADMIN — Medication 75 MILLIGRAM(S): at 22:34

## 2023-12-12 RX ADMIN — ISOSORBIDE DINITRATE 30 MILLIGRAM(S): 5 TABLET ORAL at 05:05

## 2023-12-12 RX ADMIN — Medication 75 MILLIGRAM(S): at 13:36

## 2023-12-12 RX ADMIN — CHLORHEXIDINE GLUCONATE 1 APPLICATION(S): 213 SOLUTION TOPICAL at 22:39

## 2023-12-12 RX ADMIN — Medication 25 GRAM(S): at 01:17

## 2023-12-12 RX ADMIN — CARVEDILOL PHOSPHATE 25 MILLIGRAM(S): 80 CAPSULE, EXTENDED RELEASE ORAL at 17:23

## 2023-12-12 RX ADMIN — Medication 81 MILLIGRAM(S): at 12:23

## 2023-12-12 RX ADMIN — BUDESONIDE AND FORMOTEROL FUMARATE DIHYDRATE 2 PUFF(S): 160; 4.5 AEROSOL RESPIRATORY (INHALATION) at 11:04

## 2023-12-12 RX ADMIN — Medication 9 UNIT(S): at 17:23

## 2023-12-12 RX ADMIN — HEPARIN SODIUM 14 UNIT(S)/HR: 5000 INJECTION INTRAVENOUS; SUBCUTANEOUS at 17:24

## 2023-12-12 RX ADMIN — Medication 2: at 12:25

## 2023-12-12 RX ADMIN — INSULIN GLARGINE 12 UNIT(S): 100 INJECTION, SOLUTION SUBCUTANEOUS at 22:34

## 2023-12-12 RX ADMIN — ISOSORBIDE DINITRATE 30 MILLIGRAM(S): 5 TABLET ORAL at 12:23

## 2023-12-12 RX ADMIN — ATORVASTATIN CALCIUM 80 MILLIGRAM(S): 80 TABLET, FILM COATED ORAL at 22:34

## 2023-12-12 RX ADMIN — Medication 75 MILLIGRAM(S): at 05:05

## 2023-12-12 RX ADMIN — Medication 1: at 17:23

## 2023-12-12 NOTE — PROGRESS NOTE ADULT - SUBJECTIVE AND OBJECTIVE BOX
PATIENT:  DEVORAH VALENCIA  [id number]    CHIEF COMPLAINT:  Patient is a 58y old  Male who presents with a chief complaint of Cardiogenic shock (10 Dec 2023 20:20)      INTERVAL HISTORY/OVERNIGHT EVENTS:  The patient was seen and examined at bedside.     REVIEW OF SYSTEMS:  all other ROS negative except as per HPI.     MEDICATIONS:  MEDICATIONS  (STANDING):  aspirin  chewable 81 milliGRAM(s) Oral daily  atorvastatin 80 milliGRAM(s) Oral at bedtime  budesonide  80 MICROgram(s)/formoterol 4.5 MICROgram(s) Inhaler 2 Puff(s) Inhalation two times a day  carvedilol 25 milliGRAM(s) Oral every 12 hours  chlorhexidine 2% Cloths 1 Application(s) Topical daily  clobetasol 0.05% Ointment 1 Application(s) Topical two times a day  heparin  Infusion 1300 Unit(s)/Hr (14 mL/Hr) IV Continuous <Continuous>  hydrALAZINE 75 milliGRAM(s) Oral three times a day  insulin glargine Injectable (LANTUS) 12 Unit(s) SubCutaneous at bedtime  insulin lispro (ADMELOG) corrective regimen sliding scale   SubCutaneous three times a day before meals  insulin lispro (ADMELOG) corrective regimen sliding scale   SubCutaneous at bedtime  insulin lispro Injectable (ADMELOG) 9 Unit(s) SubCutaneous three times a day before meals  isosorbide   dinitrate Tablet (ISORDIL) 30 milliGRAM(s) Oral three times a day  polyethylene glycol 3350 17 Gram(s) Oral two times a day  senna 2 Tablet(s) Oral at bedtime    MEDICATIONS  (PRN):  hydrOXYzine hydrochloride 25 milliGRAM(s) Oral two times a day PRN Anxiety      ALLERGIES:  Allergies    penicillins (Unknown)    Intolerances        OBJECTIVE:  ICU Vital Signs Last 24 Hrs  T(C): 37.1 (12 Dec 2023 03:00), Max: 37.2 (11 Dec 2023 08:00)  T(F): 98.7 (12 Dec 2023 03:00), Max: 98.9 (11 Dec 2023 08:00)  HR: 80 (12 Dec 2023 07:00) (72 - 85)  BP: --  BP(mean): --  ABP: --  ABP(mean): --  RR: 18 (12 Dec 2023 07:00) (16 - 24)  SpO2: 97% (12 Dec 2023 05:00) (95% - 100%)    O2 Parameters below as of 12 Dec 2023 07:00  Patient On (Oxygen Delivery Method): room air            Adult Advanced Hemodynamics Last 24 Hrs  CVP(mm Hg): --  CVP(cm H2O): --  CO: --  CI: --  PA: --  PA(mean): --  PCWP: --  SVR: --  SVRI: --  PVR: --  PVRI: --  CAPILLARY BLOOD GLUCOSE      POCT Blood Glucose.: 199 mg/dL (11 Dec 2023 20:34)  POCT Blood Glucose.: 145 mg/dL (11 Dec 2023 17:11)  POCT Blood Glucose.: 137 mg/dL (11 Dec 2023 12:53)  POCT Blood Glucose.: 110 mg/dL (11 Dec 2023 08:31)    CAPILLARY BLOOD GLUCOSE      POCT Blood Glucose.: 199 mg/dL (11 Dec 2023 20:34)    I&O's Summary    11 Dec 2023 07:01  -  12 Dec 2023 07:00  --------------------------------------------------------  IN: 976 mL / OUT: 1950 mL / NET: -974 mL      Daily     Daily     PHYSICAL EXAMINATION:  General: WN/WD NAD  HEENT: PERRLA, EOMI, moist mucous membranes  Neurology: A&Ox3, nonfocal, MOISE x 4  Respiratory: CTA B/L, normal respiratory effort, no wheezes, crackles, rales  CV: RRR, S1S2, no murmurs, rubs or gallops  Abdominal: Soft, NT, ND +BS, Last BM  Extremities: No edema, + peripheral pulses  Incisions:   Tubes:    LABS:                          12.2   9.66  )-----------( 147      ( 12 Dec 2023 00:38 )             37.4     12-12    138  |  105  |  29<H>  ----------------------------<  129<H>  4.7   |  22  |  1.15    Ca    10.1      12 Dec 2023 00:36  Phos  3.7     12-12  Mg     1.8     12-12    TPro  7.2  /  Alb  4.0  /  TBili  0.3  /  DBili  x   /  AST  44<H>  /  ALT  147<H>  /  AlkPhos  62  12-12    LIVER FUNCTIONS - ( 12 Dec 2023 00:36 )  Alb: 4.0 g/dL / Pro: 7.2 g/dL / ALK PHOS: 62 U/L / ALT: 147 U/L / AST: 44 U/L / GGT: x           PT/INR - ( 12 Dec 2023 00:37 )   PT: 11.9 sec;   INR: 1.08 ratio         PTT - ( 12 Dec 2023 00:37 )  PTT:59.6 sec        Urinalysis Basic - ( 12 Dec 2023 00:36 )    Color: x / Appearance: x / SG: x / pH: x  Gluc: 129 mg/dL / Ketone: x  / Bili: x / Urobili: x   Blood: x / Protein: x / Nitrite: x   Leuk Esterase: x / RBC: x / WBC x   Sq Epi: x / Non Sq Epi: x / Bacteria: x   PATIENT:  DEVORAH VALENCIA  [id number]    CHIEF COMPLAINT:  Patient is a 58y old  Male who presents with a chief complaint of Cardiogenic shock (10 Dec 2023 20:20)      INTERVAL HISTORY/OVERNIGHT EVENTS:  The patient was seen and examined at bedside.     REVIEW OF SYSTEMS:  all other ROS negative except as per HPI.     MEDICATIONS:  MEDICATIONS  (STANDING):  aspirin  chewable 81 milliGRAM(s) Oral daily  atorvastatin 80 milliGRAM(s) Oral at bedtime  budesonide  80 MICROgram(s)/formoterol 4.5 MICROgram(s) Inhaler 2 Puff(s) Inhalation two times a day  carvedilol 25 milliGRAM(s) Oral every 12 hours  chlorhexidine 2% Cloths 1 Application(s) Topical daily  clobetasol 0.05% Ointment 1 Application(s) Topical two times a day  heparin  Infusion 1300 Unit(s)/Hr (14 mL/Hr) IV Continuous <Continuous>  hydrALAZINE 75 milliGRAM(s) Oral three times a day  insulin glargine Injectable (LANTUS) 12 Unit(s) SubCutaneous at bedtime  insulin lispro (ADMELOG) corrective regimen sliding scale   SubCutaneous three times a day before meals  insulin lispro (ADMELOG) corrective regimen sliding scale   SubCutaneous at bedtime  insulin lispro Injectable (ADMELOG) 9 Unit(s) SubCutaneous three times a day before meals  isosorbide   dinitrate Tablet (ISORDIL) 30 milliGRAM(s) Oral three times a day  polyethylene glycol 3350 17 Gram(s) Oral two times a day  senna 2 Tablet(s) Oral at bedtime    MEDICATIONS  (PRN):  hydrOXYzine hydrochloride 25 milliGRAM(s) Oral two times a day PRN Anxiety      ALLERGIES:  Allergies    penicillins (Unknown)    Intolerances        OBJECTIVE:  ICU Vital Signs Last 24 Hrs  T(C): 37.1 (12 Dec 2023 03:00), Max: 37.2 (11 Dec 2023 08:00)  T(F): 98.7 (12 Dec 2023 03:00), Max: 98.9 (11 Dec 2023 08:00)  HR: 80 (12 Dec 2023 07:00) (72 - 85)  BP: --  BP(mean): --  ABP: --  ABP(mean): --  RR: 18 (12 Dec 2023 07:00) (16 - 24)  SpO2: 97% (12 Dec 2023 05:00) (95% - 100%)    O2 Parameters below as of 12 Dec 2023 07:00  Patient On (Oxygen Delivery Method): room air            Adult Advanced Hemodynamics Last 24 Hrs  CVP(mm Hg): --  CVP(cm H2O): --  CO: --  CI: --  PA: --  PA(mean): --  PCWP: --  SVR: --  SVRI: --  PVR: --  PVRI: --  CAPILLARY BLOOD GLUCOSE      POCT Blood Glucose.: 199 mg/dL (11 Dec 2023 20:34)  POCT Blood Glucose.: 145 mg/dL (11 Dec 2023 17:11)  POCT Blood Glucose.: 137 mg/dL (11 Dec 2023 12:53)  POCT Blood Glucose.: 110 mg/dL (11 Dec 2023 08:31)    CAPILLARY BLOOD GLUCOSE      POCT Blood Glucose.: 199 mg/dL (11 Dec 2023 20:34)    I&O's Summary    11 Dec 2023 07:01  -  12 Dec 2023 07:00  --------------------------------------------------------  IN: 976 mL / OUT: 1950 mL / NET: -974 mL      Daily     Daily     PHYSICAL EXAMINATION:  General: WN/WD NAD  HEENT: PERRLA, EOMI, moist mucous membranes  Neurology: A&Ox3, nonfocal, MOISE x 4  Respiratory: CTA B/L, normal respiratory effort, no wheezes, crackles, rales  CV: RRR, S1S2, no murmurs, rubs or gallops  Abdominal: Soft, NT, ND +BS, Last BM  Extremities: No edema, + peripheral pulses  Incisions:   Tubes:    LABS:                          12.2   9.66  )-----------( 147      ( 12 Dec 2023 00:38 )             37.4     12-12    138  |  105  |  29<H>  ----------------------------<  129<H>  4.7   |  22  |  1.15    Ca    10.1      12 Dec 2023 00:36  Phos  3.7     12-12  Mg     1.8     12-12    TPro  7.2  /  Alb  4.0  /  TBili  0.3  /  DBili  x   /  AST  44<H>  /  ALT  147<H>  /  AlkPhos  62  12-12    LIVER FUNCTIONS - ( 12 Dec 2023 00:36 )  Alb: 4.0 g/dL / Pro: 7.2 g/dL / ALK PHOS: 62 U/L / ALT: 147 U/L / AST: 44 U/L / GGT: x           PT/INR - ( 12 Dec 2023 00:37 )   PT: 11.9 sec;   INR: 1.08 ratio         PTT - ( 12 Dec 2023 00:37 )  PTT:59.6 sec        Urinalysis Basic - ( 12 Dec 2023 00:36 )    Color: x / Appearance: x / SG: x / pH: x  Gluc: 129 mg/dL / Ketone: x  / Bili: x / Urobili: x   Blood: x / Protein: x / Nitrite: x   Leuk Esterase: x / RBC: x / WBC x   Sq Epi: x / Non Sq Epi: x / Bacteria: x   PATIENT:  DEVORAH VALENCIA    [id number]    58M with HFrEF (LVIDd 6.4 cm, LVEF 32%), CAD s/p PCI (2008), HTN, DMT2 (A1c 8.3%) and CVA s/p TPA (2018), recently treated for PNA who initially presented to [facility] via EMS after syncope reportedly requiring defibrilation. Treated for ACS but left AMA to come to [facility]. On arrival ECG with ST depression in lateral leads. Intubated 11/1 for respiratory failure. Found to have COVID. L/RHC 11/1revealing  of LAD and RCA, elevated filling pressures and CI of 1.5 prompting placement of IABP. Extubated 11/3. IABP was weaned to off 11/7, then the following day on 11/8, developed PMVT cardiac arrest with prompt CPR and defibrillation, started on IV Lidocaine and Amio. IABP ultimately replaced on 11/9 for worsening hypotension. TTE 11/11 revealing LV thrombus. IABP replaced 11/20. Currently UNOS status 2 awaiting transplant.     INTERVAL HISTORY/OVERNIGHT EVENTS:  The patient was seen and examined at bedside. No acute events overnight. Upper extremity rash improving with steroid cream. Stable on 1:1 IABP.       REVIEW OF SYSTEMS:  all other ROS negative except as per HPI.     MEDICATIONS:  MEDICATIONS  (STANDING):  aspirin  chewable 81 milliGRAM(s) Oral daily  atorvastatin 80 milliGRAM(s) Oral at bedtime  budesonide  80 MICROgram(s)/formoterol 4.5 MICROgram(s) Inhaler 2 Puff(s) Inhalation two times a day  carvedilol 25 milliGRAM(s) Oral every 12 hours  chlorhexidine 2% Cloths 1 Application(s) Topical daily  clobetasol 0.05% Ointment 1 Application(s) Topical two times a day  heparin  Infusion 1300 Unit(s)/Hr (14 mL/Hr) IV Continuous <Continuous>  hydrALAZINE 75 milliGRAM(s) Oral three times a day  insulin glargine Injectable (LANTUS) 12 Unit(s) SubCutaneous at bedtime  insulin lispro (ADMELOG) corrective regimen sliding scale   SubCutaneous three times a day before meals  insulin lispro (ADMELOG) corrective regimen sliding scale   SubCutaneous at bedtime  insulin lispro Injectable (ADMELOG) 9 Unit(s) SubCutaneous three times a day before meals  isosorbide   dinitrate Tablet (ISORDIL) 30 milliGRAM(s) Oral three times a day  polyethylene glycol 3350 17 Gram(s) Oral two times a day  senna 2 Tablet(s) Oral at bedtime    MEDICATIONS  (PRN):  hydrOXYzine hydrochloride 25 milliGRAM(s) Oral two times a day PRN Anxiety      ALLERGIES:  Allergies  penicillins (Unknown)      OBJECTIVE:  ICU Vital Signs Last 24 Hrs  T(C): 37.1 (12 Dec 2023 03:00), Max: 37.2 (11 Dec 2023 08:00)  T(F): 98.7 (12 Dec 2023 03:00), Max: 98.9 (11 Dec 2023 08:00)  HR: 80 (12 Dec 2023 07:00) (72 - 85)    RR: 18 (12 Dec 2023 07:00) (16 - 24)  SpO2: 97% (12 Dec 2023 05:00) (95% - 100%)    O2 Parameters below as of 12 Dec 2023 07:00  Patient On (Oxygen Delivery Method): room air      CAPILLARY BLOOD GLUCOSE    POCT Blood Glucose.: 199 mg/dL (11 Dec 2023 20:34)  POCT Blood Glucose.: 145 mg/dL (11 Dec 2023 17:11)  POCT Blood Glucose.: 137 mg/dL (11 Dec 2023 12:53)  POCT Blood Glucose.: 110 mg/dL (11 Dec 2023 08:31)      I&O's Summary    11 Dec 2023 07:01  -  12 Dec 2023 07:00  --------------------------------------------------------  IN: 976 mL / OUT: 1950 mL / NET: -974 mL      PHYSICAL EXAMINATION:  General: WN/WD NAD  HEENT: PERRLA, EOMI, moist mucous membranes  Neurology: A&Ox3, nonfocal, MOISE x 4  Respiratory: CTA B/L, normal respiratory effort, no wheezes, crackles, rales  CV: RRR, S1S2, no murmurs, rubs or gallops  Abdominal: Soft, NT, ND +BS, Last BM  Extremities: R groin IABP site well appearing, no swelling or tenderness to palpation. No lower extremity edema, + peripheral pulses    LABS:                       12.2   9.66  )-----------( 147      ( 12 Dec 2023 00:38 )             37.4     12-12    138  |  105  |  29<H>  ----------------------------<  129<H>  4.7   |  22  |  1.15    Ca    10.1      12 Dec 2023 00:36  Phos  3.7     12-12  Mg     1.8     12-12    TPro  7.2  /  Alb  4.0  /  TBili  0.3  /  DBili  x   /  AST  44<H>  /  ALT  147<H>  /  AlkPhos  62  12-12    LIVER FUNCTIONS - ( 12 Dec 2023 00:36 )  Alb: 4.0 g/dL / Pro: 7.2 g/dL / ALK PHOS: 62 U/L / ALT: 147 U/L / AST: 44 U/L / GGT: x           PT/INR - ( 12 Dec 2023 00:37 )   PT: 11.9 sec;   INR: 1.08 ratio         PTT - ( 12 Dec 2023 00:37 )  PTT:59.6 sec      Urinalysis Basic - ( 12 Dec 2023 00:36 )    Color: x / Appearance: x / SG: x / pH: x  Gluc: 129 mg/dL / Ketone: x  / Bili: x / Urobili: x   Blood: x / Protein: x / Nitrite: x   Leuk Esterase: x / RBC: x / WBC x   Sq Epi: x / Non Sq Epi: x / Bacteria: x   PATIENT:  DEVORAH VALENCIA    [id number]    58M with HFrEF (LVIDd 6.4 cm, LVEF 32%), CAD s/p PCI (2008), HTN, DMT2 (A1c 8.3%) and CVA s/p TPA (2018), recently treated for PNA who initially presented to [facility] via EMS after syncope reportedly requiring defibrilation. Treated for ACS but left AMA to come to [facility]. On arrival ECG with ST depression in lateral leads. Intubated 11/1 for respiratory failure. Found to have COVID. L/RHC 11/1revealing  of LAD and RCA, elevated filling pressures and CI of 1.5 prompting placement of IABP. Extubated 11/3. IABP was weaned to off 11/7, then the following day on 11/8, developed PMVT cardiac arrest with prompt CPR and defibrillation, started on IV Lidocaine and Amio. IABP ultimately replaced on 11/9 for worsening hypotension. TTE 11/11 revealing LV thrombus. IABP replaced 11/20. Currently UNOS status 2 awaiting transplant.     INTERVAL HISTORY/OVERNIGHT EVENTS:  The patient was seen and examined at bedside. No acute events overnight. Upper extremity rash improving with steroid cream. Stable on 1:1 IABP.       REVIEW OF SYSTEMS:  all other ROS negative except as per HPI.     MEDICATIONS:  MEDICATIONS  (STANDING):  aspirin  chewable 81 milliGRAM(s) Oral daily  atorvastatin 80 milliGRAM(s) Oral at bedtime  budesonide  80 MICROgram(s)/formoterol 4.5 MICROgram(s) Inhaler 2 Puff(s) Inhalation two times a day  carvedilol 25 milliGRAM(s) Oral every 12 hours  chlorhexidine 2% Cloths 1 Application(s) Topical daily  clobetasol 0.05% Ointment 1 Application(s) Topical two times a day  heparin  Infusion 1300 Unit(s)/Hr (14 mL/Hr) IV Continuous <Continuous>  hydrALAZINE 75 milliGRAM(s) Oral three times a day  insulin glargine Injectable (LANTUS) 12 Unit(s) SubCutaneous at bedtime  insulin lispro (ADMELOG) corrective regimen sliding scale   SubCutaneous three times a day before meals  insulin lispro (ADMELOG) corrective regimen sliding scale   SubCutaneous at bedtime  insulin lispro Injectable (ADMELOG) 9 Unit(s) SubCutaneous three times a day before meals  isosorbide   dinitrate Tablet (ISORDIL) 30 milliGRAM(s) Oral three times a day  polyethylene glycol 3350 17 Gram(s) Oral two times a day  senna 2 Tablet(s) Oral at bedtime    MEDICATIONS  (PRN):  hydrOXYzine hydrochloride 25 milliGRAM(s) Oral two times a day PRN Anxiety      ALLERGIES:  Allergies  penicillins (Unknown)      OBJECTIVE:  ICU Vital Signs Last 24 Hrs  T(C): 37.1 (12 Dec 2023 03:00), Max: 37.2 (11 Dec 2023 08:00)  T(F): 98.7 (12 Dec 2023 03:00), Max: 98.9 (11 Dec 2023 08:00)  HR: 80 (12 Dec 2023 07:00) (72 - 85)    RR: 18 (12 Dec 2023 07:00) (16 - 24)  SpO2: 97% (12 Dec 2023 05:00) (95% - 100%)    O2 Parameters below as of 12 Dec 2023 07:00  Patient On (Oxygen Delivery Method): room air      CAPILLARY BLOOD GLUCOSE    POCT Blood Glucose.: 199 mg/dL (11 Dec 2023 20:34)  POCT Blood Glucose.: 145 mg/dL (11 Dec 2023 17:11)  POCT Blood Glucose.: 137 mg/dL (11 Dec 2023 12:53)  POCT Blood Glucose.: 110 mg/dL (11 Dec 2023 08:31)      I&O's Summary    11 Dec 2023 07:01  -  12 Dec 2023 07:00  --------------------------------------------------------  IN: 976 mL / OUT: 1950 mL / NET: -974 mL      PHYSICAL EXAMINATION:  General: WN/WD NAD  HEENT: PERRLA, EOMI, moist mucous membranes  Neurology: A&Ox3, nonfocal, MOISE x 4  Respiratory: CTA B/L, normal respiratory effort, no wheezes, crackles, rales  CV: RRR, S1S2, no murmurs, rubs or gallops  Abdominal: Soft, NT, ND +BS, Last BM  Extremities: R groin IABP site well appearing, no swelling or tenderness to palpation. No lower extremity edema, + peripheral pulses    LABS:                       12.2   9.66  )-----------( 147      ( 12 Dec 2023 00:38 )             37.4     12-12    138  |  105  |  29<H>  ----------------------------<  129<H>  4.7   |  22  |  1.15    Ca    10.1      12 Dec 2023 00:36  Phos  3.7     12-12  Mg     1.8     12-12    TPro  7.2  /  Alb  4.0  /  TBili  0.3  /  DBili  x   /  AST  44<H>  /  ALT  147<H>  /  AlkPhos  62  12-12    LIVER FUNCTIONS - ( 12 Dec 2023 00:36 )  Alb: 4.0 g/dL / Pro: 7.2 g/dL / ALK PHOS: 62 U/L / ALT: 147 U/L / AST: 44 U/L / GGT: x           PT/INR - ( 12 Dec 2023 00:37 )   PT: 11.9 sec;   INR: 1.08 ratio         PTT - ( 12 Dec 2023 00:37 )  PTT:59.6 sec      Urinalysis Basic - ( 12 Dec 2023 00:36 )    Color: x / Appearance: x / SG: x / pH: x  Gluc: 129 mg/dL / Ketone: x  / Bili: x / Urobili: x   Blood: x / Protein: x / Nitrite: x   Leuk Esterase: x / RBC: x / WBC x   Sq Epi: x / Non Sq Epi: x / Bacteria: x

## 2023-12-12 NOTE — PROGRESS NOTE ADULT - SUBJECTIVE AND OBJECTIVE BOX
LD VALENCIA  58y Male  MRN:[id number]    Patient is a 58y old  Male who presents with a chief complaint of NSTEMI (01 Nov 2023 20:29)    HPI:  59yo M w/ hx HTN, CAD w/ 1 stent in 2009, ICH (2008) presenting with abn ekg. Patient presented to [facility] where he was found to have STEMI, recommended to get cath however patient did not want to get it there so it left and came here.  Patient initially had cough, congestion, fever, was placed on antibiotics on Sunday.  Started feeling nauseous and had a presyncopal event after which he presented to ED last night.  Had chest pain as well.  Chest pain is midsternal.  Not currently having chest pain.  Received 4 aspirin 30 min pta. (01 Nov 2023 15:11)      Patient seen and evaluated at bedside in CCU. interval events noted    Interval HPI: remain in ccu. IABP in place        PAST MEDICAL & SURGICAL HISTORY:  HTN (hypertension)      CAD (coronary artery disease)  2009; stent      Intracranial hemorrhage  2008      Respiratory arrest  december 1st      Myocardial infarction, unspecified MI type, unspecified artery      History of coronary artery stent placement          REVIEW OF SYSTEMS:  as per hpi     VITALS:  ICU Vital Signs Last 24 Hrs  T(C): 36.8 (12 Dec 2023 17:00), Max: 37.1 (12 Dec 2023 03:00)  T(F): 98.2 (12 Dec 2023 17:00), Max: 98.7 (12 Dec 2023 03:00)  HR: 75 (12 Dec 2023 20:00) (75 - 104)  BP: --  BP(mean): --  ABP: --  ABP(mean): --  RR: 16 (12 Dec 2023 20:00) (16 - 27)  SpO2: 96% (12 Dec 2023 17:00) (96% - 100%)    O2 Parameters below as of 12 Dec 2023 18:00  Patient On (Oxygen Delivery Method): room air            PHYSICAL EXAM:  GENERAL: NAD, comfortable   HEAD:  Atraumatic, Normocephalic  EYES: EOMI, PERRLA, conjunctiva and sclera clear  NECK: Supple, No JVD   CHEST/LUNG: Clear to auscultation bilaterally; No wheeze  HEART: Regular rate and rhythm; No murmurs, rubs, or gallops  ABDOMEN: Soft, Nontender, Nondistended; Bowel sounds present  EXTREMITIES:  2+ Peripheral Pulses, No clubbing, cyanosis, or edema  NEUROLOGY: nonfocal  SKIN: +rash  +iabp    Consultant(s) Notes Reviewed:  [x ] YES  [ ] NO  Care Discussed with Consultants/Other Providers [ x] YES  [ ] NO    MEDS:  MEDICATIONS  (STANDING):  aspirin  chewable 81 milliGRAM(s) Oral daily  atorvastatin 80 milliGRAM(s) Oral at bedtime  budesonide  80 MICROgram(s)/formoterol 4.5 MICROgram(s) Inhaler 2 Puff(s) Inhalation two times a day  carvedilol 25 milliGRAM(s) Oral every 12 hours  chlorhexidine 2% Cloths 1 Application(s) Topical daily  clobetasol 0.05% Ointment 1 Application(s) Topical two times a day  heparin  Infusion 1300 Unit(s)/Hr (14 mL/Hr) IV Continuous <Continuous>  hydrALAZINE 75 milliGRAM(s) Oral three times a day  insulin glargine Injectable (LANTUS) 12 Unit(s) SubCutaneous at bedtime  insulin lispro (ADMELOG) corrective regimen sliding scale   SubCutaneous three times a day before meals  insulin lispro (ADMELOG) corrective regimen sliding scale   SubCutaneous at bedtime  insulin lispro Injectable (ADMELOG) 9 Unit(s) SubCutaneous three times a day before meals  isosorbide   dinitrate Tablet (ISORDIL) 30 milliGRAM(s) Oral three times a day  polyethylene glycol 3350 17 Gram(s) Oral two times a day  senna 2 Tablet(s) Oral at bedtime    MEDICATIONS  (PRN):  hydrOXYzine hydrochloride 25 milliGRAM(s) Oral two times a day PRN Anxiety        ALLERGIES:  penicillins (Unknown)      LABS:                         12.2   9.66  )-----------( 147      ( 12 Dec 2023 00:38 )             37.4   12-12    138  |  105  |  29<H>  ----------------------------<  129<H>  4.7   |  22  |  1.15    Ca    10.1      12 Dec 2023 00:36  Phos  3.7     12-12  Mg     1.8     12-12    TPro  7.2  /  Alb  4.0  /  TBili  0.3  /  DBili  x   /  AST  44<H>  /  ALT  147<H>  /  AlkPhos  62  12-12      < from: CT Abdomen and Pelvis No Cont (11.28.23 @ 03:38) >  IMPRESSION:  Mildly dilated colon and prominent but not overly dilated small bowel   with air-fluid levels. No discrete transition point. Findings are   suggestive of ileus.    Intra-aortic balloon pump with the inferior marker at the level of the   inferior mesenteric artery and the balloon overlying the origins of the   renal arteries, celiac artery, and superior mesenteric artery. Consider   repositioning. Concern for IABP positioning was discussed with LANETTE Hawthorne   on 11/28/2023 at 3:42 AM by Dr. Shepard.    < end of copied text >          < from: Colonoscopy (11.17.23 @ 10:35) >                                                                                                        Impression:          - The entire examined colon is normal on direct and retroflexion views.                       - No specimens collected.  Recommendation:      - Resume previous diet today.                       - No large polyps or masses detected, No objection from GI standpoint to                 proceed with heart transplantation/advanced heart therapies.                       - Please call back should any further questions or concerns arise.    < end of copied text >     < from: Xray Chest 1 View- PORTABLE-Urgent (11.01.23 @ 07:42) >    IMPRESSION:  Clear lungs.    ---End of Report ---        < end of copied text >  < from: TTE W or WO Ultrasound Enhancing Agent (11.01.23 @ 10:23) >  _____________________________     CONCLUSIONS:      1. Left ventricular cavity is moderately dilated. Left ventricular wall thickness is normal. Left ventricular systolic function is severely decreased with an ejection fraction of 32 % by Chinchilla's method of disks. Regional wall motion abnormalities present.   2. Multiple segmental abnormalities exist. See findings.   3. There is moderate (grade 2) left ventricular diastolic dysfunction, with indeterminant filling pressure.   4. Normal right ventricular cavity size, wall thickness, and systolic function.   5. No significant valvular disease.   6. No pericardial effusion seen.   7. Compared to the transthoracic echocardiogram performed on 1/25/2017 the areas of akinesis are unchanged but there has been a decline in LV systolic function with new areas of hypokinesis.    __________________________________________________________________    < end of copied text >  < from: TTE Limited W or WO Ultrasound Enhancing Agent (11.02.23 @ 07:41) >  __________________________     CONCLUSIONS:      1. After obtaining consent, Definity ultrasound enhancing agent was given for enhanced left ventricular opacification and improved delineation of the left ventricular endocardial borders. Left ventricular systolic function is severely decreased with a calculated ejection fraction of 22 % by the Chinchilla's biplane method of disks. There is a left ventricular thrombus.   2. Findings were discussed with Litzy BOSS on 11/2/2023 at 8.49am.   3. There is a left ventricular thrombus.    _________________________________________________________________    < end of copied text >   LD VALENCIA  58y Male  MRN:[id number]    Patient is a 58y old  Male who presents with a chief complaint of NSTEMI (01 Nov 2023 20:29)    HPI:  57yo M w/ hx HTN, CAD w/ 1 stent in 2009, ICH (2008) presenting with abn ekg. Patient presented to [facility] where he was found to have STEMI, recommended to get cath however patient did not want to get it there so it left and came here.  Patient initially had cough, congestion, fever, was placed on antibiotics on Sunday.  Started feeling nauseous and had a presyncopal event after which he presented to ED last night.  Had chest pain as well.  Chest pain is midsternal.  Not currently having chest pain.  Received 4 aspirin 30 min pta. (01 Nov 2023 15:11)      Patient seen and evaluated at bedside in CCU. interval events noted    Interval HPI: remain in ccu. IABP in place        PAST MEDICAL & SURGICAL HISTORY:  HTN (hypertension)      CAD (coronary artery disease)  2009; stent      Intracranial hemorrhage  2008      Respiratory arrest  december 1st      Myocardial infarction, unspecified MI type, unspecified artery      History of coronary artery stent placement          REVIEW OF SYSTEMS:  as per hpi     VITALS:  ICU Vital Signs Last 24 Hrs  T(C): 36.8 (12 Dec 2023 17:00), Max: 37.1 (12 Dec 2023 03:00)  T(F): 98.2 (12 Dec 2023 17:00), Max: 98.7 (12 Dec 2023 03:00)  HR: 75 (12 Dec 2023 20:00) (75 - 104)  BP: --  BP(mean): --  ABP: --  ABP(mean): --  RR: 16 (12 Dec 2023 20:00) (16 - 27)  SpO2: 96% (12 Dec 2023 17:00) (96% - 100%)    O2 Parameters below as of 12 Dec 2023 18:00  Patient On (Oxygen Delivery Method): room air            PHYSICAL EXAM:  GENERAL: NAD, comfortable   HEAD:  Atraumatic, Normocephalic  EYES: EOMI, PERRLA, conjunctiva and sclera clear  NECK: Supple, No JVD   CHEST/LUNG: Clear to auscultation bilaterally; No wheeze  HEART: Regular rate and rhythm; No murmurs, rubs, or gallops  ABDOMEN: Soft, Nontender, Nondistended; Bowel sounds present  EXTREMITIES:  2+ Peripheral Pulses, No clubbing, cyanosis, or edema  NEUROLOGY: nonfocal  SKIN: +rash  +iabp    Consultant(s) Notes Reviewed:  [x ] YES  [ ] NO  Care Discussed with Consultants/Other Providers [ x] YES  [ ] NO    MEDS:  MEDICATIONS  (STANDING):  aspirin  chewable 81 milliGRAM(s) Oral daily  atorvastatin 80 milliGRAM(s) Oral at bedtime  budesonide  80 MICROgram(s)/formoterol 4.5 MICROgram(s) Inhaler 2 Puff(s) Inhalation two times a day  carvedilol 25 milliGRAM(s) Oral every 12 hours  chlorhexidine 2% Cloths 1 Application(s) Topical daily  clobetasol 0.05% Ointment 1 Application(s) Topical two times a day  heparin  Infusion 1300 Unit(s)/Hr (14 mL/Hr) IV Continuous <Continuous>  hydrALAZINE 75 milliGRAM(s) Oral three times a day  insulin glargine Injectable (LANTUS) 12 Unit(s) SubCutaneous at bedtime  insulin lispro (ADMELOG) corrective regimen sliding scale   SubCutaneous three times a day before meals  insulin lispro (ADMELOG) corrective regimen sliding scale   SubCutaneous at bedtime  insulin lispro Injectable (ADMELOG) 9 Unit(s) SubCutaneous three times a day before meals  isosorbide   dinitrate Tablet (ISORDIL) 30 milliGRAM(s) Oral three times a day  polyethylene glycol 3350 17 Gram(s) Oral two times a day  senna 2 Tablet(s) Oral at bedtime    MEDICATIONS  (PRN):  hydrOXYzine hydrochloride 25 milliGRAM(s) Oral two times a day PRN Anxiety        ALLERGIES:  penicillins (Unknown)      LABS:                         12.2   9.66  )-----------( 147      ( 12 Dec 2023 00:38 )             37.4   12-12    138  |  105  |  29<H>  ----------------------------<  129<H>  4.7   |  22  |  1.15    Ca    10.1      12 Dec 2023 00:36  Phos  3.7     12-12  Mg     1.8     12-12    TPro  7.2  /  Alb  4.0  /  TBili  0.3  /  DBili  x   /  AST  44<H>  /  ALT  147<H>  /  AlkPhos  62  12-12      < from: CT Abdomen and Pelvis No Cont (11.28.23 @ 03:38) >  IMPRESSION:  Mildly dilated colon and prominent but not overly dilated small bowel   with air-fluid levels. No discrete transition point. Findings are   suggestive of ileus.    Intra-aortic balloon pump with the inferior marker at the level of the   inferior mesenteric artery and the balloon overlying the origins of the   renal arteries, celiac artery, and superior mesenteric artery. Consider   repositioning. Concern for IABP positioning was discussed with LANETTE Hawthorne   on 11/28/2023 at 3:42 AM by Dr. Shepard.    < end of copied text >          < from: Colonoscopy (11.17.23 @ 10:35) >                                                                                                        Impression:          - The entire examined colon is normal on direct and retroflexion views.                       - No specimens collected.  Recommendation:      - Resume previous diet today.                       - No large polyps or masses detected, No objection from GI standpoint to                 proceed with heart transplantation/advanced heart therapies.                       - Please call back should any further questions or concerns arise.    < end of copied text >     < from: Xray Chest 1 View- PORTABLE-Urgent (11.01.23 @ 07:42) >    IMPRESSION:  Clear lungs.    ---End of Report ---        < end of copied text >  < from: TTE W or WO Ultrasound Enhancing Agent (11.01.23 @ 10:23) >  _____________________________     CONCLUSIONS:      1. Left ventricular cavity is moderately dilated. Left ventricular wall thickness is normal. Left ventricular systolic function is severely decreased with an ejection fraction of 32 % by Chinchilla's method of disks. Regional wall motion abnormalities present.   2. Multiple segmental abnormalities exist. See findings.   3. There is moderate (grade 2) left ventricular diastolic dysfunction, with indeterminant filling pressure.   4. Normal right ventricular cavity size, wall thickness, and systolic function.   5. No significant valvular disease.   6. No pericardial effusion seen.   7. Compared to the transthoracic echocardiogram performed on 1/25/2017 the areas of akinesis are unchanged but there has been a decline in LV systolic function with new areas of hypokinesis.    __________________________________________________________________    < end of copied text >  < from: TTE Limited W or WO Ultrasound Enhancing Agent (11.02.23 @ 07:41) >  __________________________     CONCLUSIONS:      1. After obtaining consent, Definity ultrasound enhancing agent was given for enhanced left ventricular opacification and improved delineation of the left ventricular endocardial borders. Left ventricular systolic function is severely decreased with a calculated ejection fraction of 22 % by the Chinchilla's biplane method of disks. There is a left ventricular thrombus.   2. Findings were discussed with Lizty BOSS on 11/2/2023 at 8.49am.   3. There is a left ventricular thrombus.    _________________________________________________________________    < end of copied text >

## 2023-12-12 NOTE — CHART NOTE - NSCHARTNOTEFT_GEN_A_CORE
Nutrition Follow Up Note  Patient seen for: Nutrition Follow up  Chart reviewed, events noted    Per chart, patient is a 57 y/o male with PMH including HTN, CAD (s/p PCI ), HFrEF (EF severely reduced ) not on GDMT, h/o CVA  (requiring TPA), DM. Patient presents to [facility] for SOB x1 week. Intubated on  for respiratory failure. Extubated to nasal cannula 11/3. Found to be COVID-19 positive w/ persistent fevers. S/p IABP for hemodynamic support in setting of possible sepsis. S/p VFib arrest requiring multiple shocks, ROSC ~10 minutes on , transfers back to CICU.    Source: [x] Patient       [x] EMR        [x] RN        [] Family at bedside        [] Other:  - If unable to interview patient: [] Trach/Vent/BiPAP  [] Disoriented/confused/inappropriate to interview    Diet, Consistent Carbohydrate w/Evening Snack (23 @ 10:45) [Active]  -> Pt reports intact appetite; eating well again at meals. Reports his family also bringing him some items from home. Education as below.    Nutrition-related concerns:  - Listed status 2 for OHT  - Remains on IABP for mechanical support  - 12 units lantus, ISS admleog for glycemic control at this time; HbA1c: 8.3% (suboptimal glycemic control for age)  - ARIC; Nephrology following    GI: Last BM:  per pt reports. Pt denies GI distress at this time.  Bowel Regimen? [x] Yes - senna, miralax    Weights:   Daily Weight in k.3 (12-10), Weight in k.1 (), Weight in k.2 ()  Wt history: Pt reports UBW ~180-190 pounds; reports recent weights likely elevated secondary to fluid retention. On/off diuresis. Will monitor trends as able. Noted weight trending downward towards UBW of ~81-86 kG.    Drug Dosing Weight  Height (cm): 175.3 (2023 15:48)  Weight (kg): 98.9 (2023 15:48)  BMI (kg/m2): 32.2 (2023 15:48)-> recalculated based on lowest on daily wt of 87.4 kG () (28.4 kG/m2).  BSA (m2): 2.14 (2023 15:48)  Reported UBW: ~81-86 kG    MEDICATIONS  (STANDING):  aspirin  chewable 81 milliGRAM(s) Oral daily  atorvastatin 80 milliGRAM(s) Oral at bedtime  budesonide  80 MICROgram(s)/formoterol 4.5 MICROgram(s) Inhaler 2 Puff(s) Inhalation two times a day  carvedilol 25 milliGRAM(s) Oral every 12 hours  chlorhexidine 2% Cloths 1 Application(s) Topical daily  clobetasol 0.05% Ointment 1 Application(s) Topical two times a day  heparin  Infusion 1300 Unit(s)/Hr (14 mL/Hr) IV Continuous <Continuous>  hydrALAZINE 75 milliGRAM(s) Oral three times a day  insulin glargine Injectable (LANTUS) 12 Unit(s) SubCutaneous at bedtime  insulin lispro (ADMELOG) corrective regimen sliding scale   SubCutaneous three times a day before meals  insulin lispro (ADMELOG) corrective regimen sliding scale   SubCutaneous at bedtime  insulin lispro Injectable (ADMELOG) 9 Unit(s) SubCutaneous three times a day before meals  isosorbide   dinitrate Tablet (ISORDIL) 30 milliGRAM(s) Oral three times a day  polyethylene glycol 3350 17 Gram(s) Oral two times a day  senna 2 Tablet(s) Oral at bedtime    Pertinent Labs:  @ 00:36: Na 138, BUN 29<H>, Cr 1.15, <H>, K+ 4.7, Phos 3.7, Mg 1.8, Alk Phos 62, ALT/SGPT 147<H>, AST/SGOT 44<H>, HbA1c --    A1C with Estimated Average Glucose Result: 8.3 % (23 @ 06:14)    Finger Sticks:  POCT Blood Glucose.: 159 mg/dL ( @ 08:40)  POCT Blood Glucose.: 199 mg/dL ( @ 20:34)  POCT Blood Glucose.: 145 mg/dL ( @ 17:11)  POCT Blood Glucose.: 137 mg/dL ( @ 12:53)    Skin per nursing documentation: no pressure injuries per documentation  Edema: none per documentation    Estimated Needs:   [x] recalculated: based on wt of 87 kG with consideration for increased nutrient needs:  Estimated Caloric Needs: (25-30 kcal/kg): 8070-9859 kcal  Estimated Protein Needs: (1.3-1.6 g/kg): 113-139 gm  Defer fluid needs to team.    Previous Nutrition Diagnosis: increased nutrient needs  Nutrition Diagnosis is: [x] ongoing - being addressed with adequate oral intake    Previous Nutrition Diagnosis: Inadequate energy intake  Nutrition Diagnosis is: [x] ongoing; pt appetite has improved    Previous Nutrition Diagnosis: Food & Nutrition-Related Knowledge Deficit   Nutrition Diagnosis is: [x] ongoing - being addressed with ongoing diet education PRN    New Nutrition Diagnosis: [x] N/A    Nutrition Care Plan:  [x] In Progress  [] Achieved  [] Not applicable    Nutrition Interventions:     Education Provided:       [x] Yes: 1. Food safety education re-discussed as pt listed for OHT.  2. RD also discussed education on T2DM nutrition therapy. Provided education on foods containing carbohydrates, foods containing proteins, and portion sizes. Discussed the importance of a balanced meal to maintain blood glucose. Recommended water consumption with avoidance of soda and juice. Discussed to avoid concentrated sweets. Pt somewhat receptive; would benefit from ongoing nutrition education follow up. Pt reports not wanting to be on consistent carbohydrate diet at this time. Reports he is not a "sweets hazel." RD emphasized importance of BG control and especially post-transplant BG control.    Recommendations:  1. Continue Consistent carbohydrate diet with snack + 1.2L Fluid restriction per team as tolerated  -> can consider diet liberalization to regular per pt request if team OK  2. RD remains available to provide ongoing nutrition education PRN   3. Trend BG levels, renal indices, LFT's, electrolytes and triglycerides   4. Honor pt food preferences to promote adequate oral intake while in-house     Monitoring and Evaluation:   Continue to monitor nutritional intake, tolerance to diet prescription, weights, labs, skin integrity    RD remains available upon request and will follow up per protocol  Sultana Velazquez MS, RD, CDN, CNSC avail. on [employer] Teams Nutrition Follow Up Note  Patient seen for: Nutrition Follow up  Chart reviewed, events noted    Per chart, patient is a 57 y/o male with PMH including HTN, CAD (s/p PCI ), HFrEF (EF severely reduced ) not on GDMT, h/o CVA  (requiring TPA), DM. Patient presents to [facility] for SOB x1 week. Intubated on  for respiratory failure. Extubated to nasal cannula 11/3. Found to be COVID-19 positive w/ persistent fevers. S/p IABP for hemodynamic support in setting of possible sepsis. S/p VFib arrest requiring multiple shocks, ROSC ~10 minutes on , transfers back to CICU.    Source: [x] Patient       [x] EMR        [x] RN        [] Family at bedside        [] Other:  - If unable to interview patient: [] Trach/Vent/BiPAP  [] Disoriented/confused/inappropriate to interview    Diet, Consistent Carbohydrate w/Evening Snack (23 @ 10:45) [Active]  -> Pt reports intact appetite; eating well again at meals. Reports his family also bringing him some items from home. Education as below.    Nutrition-related concerns:  - Listed status 2 for OHT  - Remains on IABP for mechanical support  - 12 units lantus, ISS admleog for glycemic control at this time; HbA1c: 8.3% (suboptimal glycemic control for age)  - ARIC; Nephrology following    GI: Last BM:  per pt reports. Pt denies GI distress at this time.  Bowel Regimen? [x] Yes - senna, miralax    Weights:   Daily Weight in k.3 (12-10), Weight in k.1 (), Weight in k.2 ()  Wt history: Pt reports UBW ~180-190 pounds; reports recent weights likely elevated secondary to fluid retention. On/off diuresis. Will monitor trends as able. Noted weight trending downward towards UBW of ~81-86 kG.    Drug Dosing Weight  Height (cm): 175.3 (2023 15:48)  Weight (kg): 98.9 (2023 15:48)  BMI (kg/m2): 32.2 (2023 15:48)-> recalculated based on lowest on daily wt of 87.4 kG () (28.4 kG/m2).  BSA (m2): 2.14 (2023 15:48)  Reported UBW: ~81-86 kG    MEDICATIONS  (STANDING):  aspirin  chewable 81 milliGRAM(s) Oral daily  atorvastatin 80 milliGRAM(s) Oral at bedtime  budesonide  80 MICROgram(s)/formoterol 4.5 MICROgram(s) Inhaler 2 Puff(s) Inhalation two times a day  carvedilol 25 milliGRAM(s) Oral every 12 hours  chlorhexidine 2% Cloths 1 Application(s) Topical daily  clobetasol 0.05% Ointment 1 Application(s) Topical two times a day  heparin  Infusion 1300 Unit(s)/Hr (14 mL/Hr) IV Continuous <Continuous>  hydrALAZINE 75 milliGRAM(s) Oral three times a day  insulin glargine Injectable (LANTUS) 12 Unit(s) SubCutaneous at bedtime  insulin lispro (ADMELOG) corrective regimen sliding scale   SubCutaneous three times a day before meals  insulin lispro (ADMELOG) corrective regimen sliding scale   SubCutaneous at bedtime  insulin lispro Injectable (ADMELOG) 9 Unit(s) SubCutaneous three times a day before meals  isosorbide   dinitrate Tablet (ISORDIL) 30 milliGRAM(s) Oral three times a day  polyethylene glycol 3350 17 Gram(s) Oral two times a day  senna 2 Tablet(s) Oral at bedtime    Pertinent Labs:  @ 00:36: Na 138, BUN 29<H>, Cr 1.15, <H>, K+ 4.7, Phos 3.7, Mg 1.8, Alk Phos 62, ALT/SGPT 147<H>, AST/SGOT 44<H>, HbA1c --    A1C with Estimated Average Glucose Result: 8.3 % (23 @ 06:14)    Finger Sticks:  POCT Blood Glucose.: 159 mg/dL ( @ 08:40)  POCT Blood Glucose.: 199 mg/dL ( @ 20:34)  POCT Blood Glucose.: 145 mg/dL ( @ 17:11)  POCT Blood Glucose.: 137 mg/dL ( @ 12:53)    Skin per nursing documentation: no pressure injuries per documentation  Edema: none per documentation    Estimated Needs:   [x] recalculated: based on wt of 87 kG with consideration for increased nutrient needs:  Estimated Caloric Needs: (25-30 kcal/kg): 4839-9703 kcal  Estimated Protein Needs: (1.3-1.6 g/kg): 113-139 gm  Defer fluid needs to team.    Previous Nutrition Diagnosis: increased nutrient needs  Nutrition Diagnosis is: [x] ongoing - being addressed with adequate oral intake    Previous Nutrition Diagnosis: Inadequate energy intake  Nutrition Diagnosis is: [x] ongoing; pt appetite has improved    Previous Nutrition Diagnosis: Food & Nutrition-Related Knowledge Deficit   Nutrition Diagnosis is: [x] ongoing - being addressed with ongoing diet education PRN    New Nutrition Diagnosis: [x] N/A    Nutrition Care Plan:  [x] In Progress  [] Achieved  [] Not applicable    Nutrition Interventions:     Education Provided:       [x] Yes: 1. Food safety education re-discussed as pt listed for OHT.  2. RD also discussed education on T2DM nutrition therapy. Provided education on foods containing carbohydrates, foods containing proteins, and portion sizes. Discussed the importance of a balanced meal to maintain blood glucose. Recommended water consumption with avoidance of soda and juice. Discussed to avoid concentrated sweets. Pt somewhat receptive; would benefit from ongoing nutrition education follow up. Pt reports not wanting to be on consistent carbohydrate diet at this time. Reports he is not a "sweets hazel." RD emphasized importance of BG control and especially post-transplant BG control.    Recommendations:  1. Continue Consistent carbohydrate diet with snack + 1.2L Fluid restriction per team as tolerated  -> can consider diet liberalization to regular per pt request if team OK  2. RD remains available to provide ongoing nutrition education PRN   3. Trend BG levels, renal indices, LFT's, electrolytes and triglycerides   4. Honor pt food preferences to promote adequate oral intake while in-house     Monitoring and Evaluation:   Continue to monitor nutritional intake, tolerance to diet prescription, weights, labs, skin integrity    RD remains available upon request and will follow up per protocol  Sultana Velazquez MS, RD, CDN, CNSC avail. on [employer] Teams

## 2023-12-12 NOTE — PROGRESS NOTE ADULT - ASSESSMENT
====================ASSESSMENT ==============  58 male with HTN, CAD (s/p PCI 2008), HFrEF, CVA 2018, and T2DM presenting with chest pressure and unknown tachycardia that was shocked x1, [facility] 11/1 found to have in-stent restenosis of pLAD and  of RCA with elevated RA and PA pressures and severely decreased. Admitted to CICU for management of cardiogenic shock and ADHF requiring IABP 11/1 -11/7, with hospital course c/b vfib arrest requiring reinsertion of IABP. Not recommended for ATs per HF. Currently listed for transplant status 2.    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 AT evaluation launched 11/10, he is ABO A. He was discussed during TSC on 11/16 and was approved for listing, however was found to be Bacteremic with 11/17 Blc Cx growing mixed cultures Staph epi and Enterococcus faecalis and started on IV Vanco. Repeat cultures from 11/18 reveal NGTD and therefore was cleared by ID for listing.     He appears adequately supported on IABP at 1:1, electrically quiescent on oral Amiodarone. Cr is improving with holding diuretics. Labs otherwise notable for worsening thrombocytopenia. Awaiting suitable donor. Now with GM + rods in blood culture on 11/30 (reported on 12/4), restarted on vancomycin, and status 7, repeat cultures now negative x48 hours (12/6),  now status 2 again.       ====================== NEUROLOGY=====================  Anxiety  - No Seroquel/antipsychotics since vfib arrest and prolonged QTC  - Psych Eval, recommended SSRI, but pt. refused   - Psych recommending atarax PRN  - Continue to monitor mental status    PT/Conditioning  - Continue band exercised while on bedrest s/t IABP    ==================== RESPIRATORY======================  Acute Hypoxemic Respiratory Failure  - s/p x2 intubations for cardiogenic pulm edema and the in setting of cardiac arrest, resolved - extubated 11/10  - Currently maintaining >95% sats on room air  - Continue incentive spirometry and monitoring of sp02    Asthma  - c/w albuterol, symbicort and spiriva  - On trelegy at home  - Continue to monitor SpO2 with goal >94%    ====================CARDIOVASCULAR==================  Vfib arrest i/s/o ischemia  - Lido gtt off 11/13  - PO Amio load - total of 5g per EP complete 11/17, continue PO amio  - Amio held for rising LFTs, continue to hold  - Keep K > 4, Mag > 2.2     Cardiogenic shock requiring IABP (11/1- 11/7, 11/9-)  - Likely 2/2 NSTEMI and ADHF  - 11/1 LHC: pLAD 100 % in-stent restenosis & mRCA, 100 %. PCWP 30. IABP placed.  - 11/1 TTE: LV dilated. EF 32 %. Regional WMAs present, mod (grade 2) LV diastolic dysfunction  - 11/2 TTE: EF 22% and + LV thrombus  - IABP swapped 11/20 to RFA, continue 1:1 support  - Off Milrinone gtt @ 7:30 am 11/11  - James d/c'd due to elevated K levels  - c/w coreg 25 BID for GDMT  - HIT and HAIDER sent (12/3) as per HF   - UNOS status 2 as of 12/6  - 12/5 - reduced hydralazine 100 TID to 75 TID and ISD 40 TID to 30 TID for AL reduction    NSTEMI iso stent re-occlusion of pLAD and 100%  of RCA  - EKG on admission w/ LBBB  - DAPT: c/w ASA, Brilinta d/c'd per transplant w/u  - c/w lipitor 80  - cMR deferred given necessity of IABP  - CT sx not recommending CABG, undergoing AT eval    LV thrombus  - c/w heparin gtt w/ therapeutic PTT    ===================== RENAL =========================  Non-oliguric ARIC, resolved   - Baseline Cr: 1-1.22  - Renal US: no evidence of renal artery stenosis  - Trend BMP, lytes daily, replace as needed  - Continue Strict I/Os, avoid nephrotoxins    Mild Hyponatremia/Hyperkalemia iso ARIC  - Continue fluid restriction   - Urea powder d/c'd per renal  - Trend daily  - Continue monitoring urine output, lytes, SCr/ BUN  - Replete lytes prn with goal K >4 and Mg >2    =============== GASTROINTESTINAL===================  Constipation/ileus, resolved  - regular diet  - bowel reg prn    ===================ENDO====================  Type 2 DM  - A1c 8.3   - Continue lantus, premeal, low ISS  - continue FS    ===================HEMATOLOGIC/ONC ===================  - H/H & plts stable  - Monitor H/H and plts  - VTE PPX: heparin gtt    ==================INFECTIOUS DISEASE================  # Positive blood culture, resolved  - Repeat BCx drawn 11/30 for leukocytosis to 12k - positive on 12/4, G+ rods in anaerobic bottle, suspect contaminant  - Repeat cultures x2 sent 12/4 per transplant ID recs - no growth to date  - Vancomycin by trough (12/4-12/6)  - Final microbial ID: Cutibacterium acnes, per ID this is likely to be a skin contaminant, vanc dc'd.   - Dental eval 12/7 to rule out infectious etiology that would have led to bacteremia, no significant findings on exam.     # Enterococcus faecalis bacteremia, resolved  - BCx 11/17+ for enterococcus faecalis x2, Staph epi x1 (likely contaminant)- pan sensitive   - Urine cx 11/18 + enterococcus faecalis  - BCx 11/18 no growth   - IABP site swapped to RFA 11/20  - s/p Vancomycin 1g q12h (11/18-11/27)  - CT A/P negative for infectious pathology    # COVID, resolved  - Off airborne precautions 11/11    # Pre-transplant ID w/u   - Trend ID recs for serologies   - Colonoscopy 11/17 - normal   - Chest CT 11/17 - improved LLL aeration  - s/p immunizations    ==================DERMATOLOGY================  # Upper Extremity Rash  - Patient developed bilateral upper extremity rash after receiving Hepatitis A & B immunizations on 11/24  - Dermatology consulted 12/5 - not likely to be related to vanco, can continue per ID recs  - Recommend clobetasol 0.05% BID to affected areas   - RVP repeated to rule out viral etiology, negative   ====================ASSESSMENT ==============  58 male with HTN, CAD (s/p PCI 2008), HFrEF, CVA 2018, and T2DM presenting with chest pressure and unknown tachycardia that was shocked x1, [facility] 11/1 found to have in-stent restenosis of pLAD and  of RCA with elevated RA and PA pressures and severely decreased. Admitted to CICU for management of cardiogenic shock and ADHF requiring IABP 11/1 -11/7, with hospital course c/b vfib arrest requiring reinsertion of IABP. Not recommended for ATs per HF. Currently listed for transplant status 2.    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 AT evaluation launched 11/10, he is ABO A. He was discussed during TSC on 11/16 and was approved for listing, however was found to be Bacteremic with 11/17 Blc Cx growing mixed cultures Staph epi and Enterococcus faecalis and started on IV Vanco. Repeat cultures from 11/18 reveal NGTD and therefore was cleared by ID for listing.     He appears adequately supported on IABP at 1:1, electrically quiescent on oral Amiodarone. Cr is improving with holding diuretics. Labs otherwise notable for worsening thrombocytopenia. Awaiting suitable donor. Now with GM + rods in blood culture on 11/30 (reported on 12/4), restarted on vancomycin, and status 7, repeat cultures now negative x48 hours (12/6),  now status 2 again.       ====================== NEUROLOGY=====================  Anxiety  - No Seroquel/antipsychotics since vfib arrest and prolonged QTC  - Psych Eval, recommended SSRI, but pt. refused   - Psych recommending atarax PRN  - Continue to monitor mental status    PT/Conditioning  - Continue band exercised while on bedrest s/t IABP    ==================== RESPIRATORY======================  Acute Hypoxemic Respiratory Failure  - s/p x2 intubations for cardiogenic pulm edema and the in setting of cardiac arrest, resolved - extubated 11/10  - Currently maintaining >95% sats on room air  - Continue incentive spirometry and monitoring of sp02    Asthma  - c/w albuterol, symbicort and spiriva  - On trelegy at home  - Continue to monitor SpO2 with goal >94%    ====================CARDIOVASCULAR==================  Vfib arrest i/s/o ischemia  - Lido gtt off 11/13  - PO Amio load - total of 5g per EP complete 11/17, continue PO amio  - Amio held for rising LFTs, continue to hold  - Keep K > 4, Mag > 2.2     Cardiogenic shock requiring IABP (11/1- 11/7, 11/9-)  - Likely 2/2 NSTEMI and ADHF  - 11/1 LHC: pLAD 100 % in-stent restenosis & mRCA, 100 %. PCWP 30. IABP placed.  - 11/1 TTE: LV dilated. EF 32 %. Regional WMAs present, mod (grade 2) LV diastolic dysfunction  - 11/2 TTE: EF 22% and + LV thrombus  - IABP swapped 11/20 to RFA, continue 1:1 support  - Off Milrinone gtt @ 7:30 am 11/11  - James d/c'd due to elevated K levels  - c/w coreg 25 BID for GDMT  - HIT and HAIDER sent (12/3) as per HF   - UNOS status 2 as of 12/6  - 12/5 - reduced hydralazine 100 TID to 75 TID and ISD 40 TID to 30 TID for AL reduction    NSTEMI iso stent re-occlusion of pLAD and 100%  of RCA  - EKG on admission w/ LBBB  - DAPT: c/w ASA, Brilinta d/c'd per transplant w/u  - c/w lipitor 80  - cMR deferred given necessity of IABP  - CT sx not recommending CABG, undergoing AT eval    LV thrombus  - c/w heparin gtt w/ therapeutic PTT    ===================== RENAL =========================  Non-oliguric ARIC, resolved   - Baseline Cr: 1-1.22  - Renal US: no evidence of renal artery stenosis  - Trend BMP, lytes daily, replace as needed  - Continue Strict I/Os, avoid nephrotoxins    Mild Hyponatremia/Hyperkalemia iso ARIC  - Continue fluid restriction   - Urea powder d/c'd per renal  - Trend daily  - Continue monitoring urine output, lytes, SCr/ BUN  - Replete lytes prn with goal K >4 and Mg >2    =============== GASTROINTESTINAL===================  Constipation/ileus, resolved  - regular diet  - bowel reg prn    ===================ENDO====================  Type 2 DM  - A1c 8.3   - Continue lantus, premeal, low ISS  - continue FS    ===================HEMATOLOGIC/ONC ===================  - H/H & plts stable  - Monitor H/H and plts  - VTE PPX: heparin gtt    ==================INFECTIOUS DISEASE================  # Positive blood culture, resolved  - Repeat BCx drawn 11/30 for leukocytosis to 12k - positive on 12/4, G+ rods in anaerobic bottle, suspect contaminant  - Repeat cultures x2 sent 12/4 per transplant ID recs - no growth to date  - Vancomycin by trough (12/4-12/6)  - Final microbial ID: Cutibacterium acnes, per ID this is likely to be a skin contaminant, vanc dc'd.   - Dental eval 12/7 to rule out infectious etiology that would have led to bacteremia, no significant findings on exam.     # Enterococcus faecalis bacteremia, resolved  - BCx 11/17+ for enterococcus faecalis x2, Staph epi x1 (likely contaminant)- pan sensitive   - Urine cx 11/18 + enterococcus faecalis  - BCx 11/18 no growth   - IABP site swapped to RFA 11/20  - s/p Vancomycin 1g q12h (11/18-11/27)  - CT A/P negative for infectious pathology    # COVID, resolved  - Off airborne precautions 11/11    # Pre-transplant ID w/u   - Trend ID recs for serologies   - Colonoscopy 11/17 - normal   - Chest CT 11/17 - improved LLL aeration  - s/p immunizations    ==================DERMATOLOGY================  # Upper Extremity Rash  - Patient developed bilateral upper extremity rash after receiving Hepatitis A & B immunizations on 11/24  - Dermatology consulted 12/5 - not likely to be related to vanco, can continue per ID recs  - Recommend clobetasol 0.05% BID to affected areas   - RVP repeated to rule out viral etiology, negative   ====================ASSESSMENT ==============  58 male with HTN, CAD (s/p PCI 2008), HFrEF, CVA 2018, and T2DM presenting with chest pressure and unknown tachycardia that was shocked x1, [facility] 11/1 found to have in-stent restenosis of pLAD and  of RCA with elevated RA and PA pressures and severely decreased. Admitted to CICU for management of cardiogenic shock and ADHF requiring IABP 11/1 -11/7, with hospital course c/b vfib arrest requiring reinsertion of IABP. Not recommended for ATs per HF. Currently listed for transplant status 2.    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 AT evaluation launched 11/10, he is ABO A. He was discussed during TSC on 11/16 and was approved for listing, however was found to be Bacteremic with 11/17 Blc Cx growing mixed cultures Staph epi and Enterococcus faecalis and started on IV Vanco. Repeat cultures from 11/18 reveal NGTD and therefore was cleared by ID for listing.     He appears adequately supported on IABP at 1:1, electrically quiescent on oral Amiodarone. Cr is improving with holding diuretics. Labs otherwise notable for worsening thrombocytopenia. Awaiting suitable donor. Now with GM + rods in blood culture on 11/30 (reported on 12/4), restarted on vancomycin, and status 7, repeat cultures now negative x48 hours (12/6), now status 2 again.       ====================== NEUROLOGY=====================  Anxiety  - No Seroquel/antipsychotics since vfib arrest and prolonged QTC  - Psych Eval, recommended SSRI, but pt. refused   - Psych recommending atarax PRN  - Continue to monitor mental status    PT/Conditioning  - Continue band exercises while on bedrest s/t IABP    ==================== RESPIRATORY======================  Acute Hypoxemic Respiratory Failure  - s/p x2 intubations for cardiogenic pulm edema and the in setting of cardiac arrest, resolved - extubated 11/10  - Currently maintaining >95% sats on room air  - Continue incentive spirometry and monitoring of sp02    Asthma  - c/w albuterol, symbicort and spiriva  - On trelegy at home  - Continue to monitor SpO2 with goal >94%    ====================CARDIOVASCULAR==================  Vfib arrest i/s/o ischemia  - Lido gtt off 11/13  - PO Amio load - total of 5g per EP complete 11/17  - Amio dc'd 12/5 for rising LFTs  - Keep K > 4, Mag > 2.2     Cardiogenic shock requiring IABP (11/1- 11/7, 11/9-)  - Likely 2/2 NSTEMI and ADHF  - 11/1 LHC: pLAD 100 % in-stent restenosis & mRCA, 100 %. PCWP 30. IABP placed.  - 11/1 TTE: LV dilated. EF 32 %. Regional WMAs present, mod (grade 2) LV diastolic dysfunction  - 11/2 TTE: EF 22% and + LV thrombus  - IABP swapped 11/20 to RFA, continue 1:1 support  - Off Milrinone gtt @ 7:30 am 11/11  - James d/c'd due to elevated K levels  - c/w coreg 25 BID for GDMT  - HIT and HAIDER sent (12/3) as per HF   - UNOS status 2 as of 12/6  - 12/5 - reduced hydralazine 100 TID to 75 TID and ISD 40 TID to 30 TID for AL reduction    NSTEMI iso stent re-occlusion of pLAD and 100%  of RCA  - EKG on admission w/ LBBB  - DAPT: c/w ASA, Brilinta d/c'd per transplant w/u  - c/w lipitor 80  - cMR deferred given necessity of IABP  - CT sx not recommending CABG, undergoing AT eval    LV thrombus  - c/w heparin gtt w/ therapeutic PTT    ===================== RENAL =========================  Non-oliguric ARIC, resolved   - Baseline Cr: 1-1.22  - Renal US: no evidence of renal artery stenosis  - Trend BMP, lytes daily, replace as needed  - Continue Strict I/Os, avoid nephrotoxins    Mild Hyponatremia/Hyperkalemia iso ARIC  - Continue fluid restriction   - Urea powder d/c'd per renal  - Trend daily  - Continue monitoring urine output, lytes, SCr/ BUN  - Replete lytes prn with goal K >4 and Mg >2    =============== GASTROINTESTINAL===================  Constipation/ileus, resolved  - regular diet  - bowel reg prn    ===================ENDO====================  Type 2 DM  - A1c 8.3   - Continue lantus, premeal, low ISS  - continue FS    ===================HEMATOLOGIC/ONC ===================  - H/H & plts stable  - Monitor H/H and plts  - VTE PPX: heparin gtt    ==================INFECTIOUS DISEASE================  # Positive blood culture, resolved  - Repeat BCx drawn 11/30 for leukocytosis to 12k - positive on 12/4, G+ rods in anaerobic bottle, suspect contaminant  - Repeat cultures x2 sent 12/4 per transplant ID recs - no growth to date  - Vancomycin by trough (12/4-12/6)  - Final microbial ID: Cutibacterium acnes, per ID this is likely to be a skin contaminant, vanc dc'd.   - Dental eval 12/7 to rule out infectious etiology that would have led to bacteremia, no significant findings on exam.     # Enterococcus faecalis bacteremia, resolved  - BCx 11/17+ for enterococcus faecalis x2, Staph epi x1 (likely contaminant)- pan sensitive   - Urine cx 11/18 + enterococcus faecalis  - BCx 11/18 no growth   - IABP site swapped to RFA 11/20  - s/p Vancomycin 1g q12h (11/18-11/27)  - CT A/P negative for infectious pathology    # COVID, resolved  - Off airborne precautions 11/11    # Pre-transplant ID w/u   - Trend ID recs for serologies   - Colonoscopy 11/17 - normal   - Chest CT 11/17 - improved LLL aeration  - s/p immunizations    ==================DERMATOLOGY================  # Upper Extremity Rash  - Patient developed bilateral upper extremity rash after receiving Hepatitis A & B immunizations on 11/24  - Dermatology consulted 12/5 - not likely to be related to vanco  - Recommend clobetasol 0.05% BID to affected areas   - RVP repeated to rule out viral etiology, negative   ====================ASSESSMENT ==============  58 male with HTN, CAD (s/p PCI 2008), HFrEF, CVA 2018, and T2DM presenting with chest pressure and unknown tachycardia that was shocked x1, [facility] 11/1 found to have in-stent restenosis of pLAD and  of RCA with elevated RA and PA pressures and severely decreased. Admitted to CICU for management of cardiogenic shock and ADHF requiring IABP 11/1 -11/7, with hospital course c/b vfib arrest requiring reinsertion of IABP. Not recommended for ATs per HF. Currently listed for transplant status 2.    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 AT evaluation launched 11/10, he is ABO A. He was discussed during TSC on 11/16 and was approved for listing, however was found to be Bacteremic with 11/17 Blc Cx growing mixed cultures Staph epi and Enterococcus faecalis and started on IV Vanco. Repeat cultures from 11/18 reveal NGTD and therefore was cleared by ID for listing.     He appears adequately supported on IABP at 1:1, electrically quiescent on oral Amiodarone. Cr is improving with holding diuretics. Labs otherwise notable for worsening thrombocytopenia. Awaiting suitable donor. Now with GM + rods in blood culture on 11/30 (reported on 12/4), restarted on vancomycin, and status 7, repeat cultures now negative x48 hours (12/6), now status 2 again.       ====================== NEUROLOGY=====================  Anxiety  - No Seroquel/antipsychotics since vfib arrest and prolonged QTC  - Psych Eval, recommended SSRI, but pt. refused   - Psych recommending atarax PRN  - Continue to monitor mental status    PT/Conditioning  - Continue band exercises while on bedrest s/t IABP    ==================== RESPIRATORY======================  Acute Hypoxemic Respiratory Failure  - s/p x2 intubations for cardiogenic pulm edema and the in setting of cardiac arrest, resolved - extubated 11/10  - Currently maintaining >95% sats on room air  - Continue incentive spirometry and monitoring of sp02    Asthma  - c/w albuterol, symbicort and spiriva  - On trelegy at home  - Continue to monitor SpO2 with goal >94%    ====================CARDIOVASCULAR==================  Vfib arrest i/s/o ischemia  - Lido gtt off 11/13  - PO Amio load - total of 5g per EP complete 11/17  - Amio dc'd 12/5 for rising LFTs  - Keep K > 4, Mag > 2.2     Cardiogenic shock requiring IABP (11/1- 11/7, 11/9-)  - Likely 2/2 NSTEMI and ADHF  - 11/1 LHC: pLAD 100 % in-stent restenosis & mRCA, 100 %. PCWP 30. IABP placed.  - 11/1 TTE: LV dilated. EF 32 %. Regional WMAs present, mod (grade 2) LV diastolic dysfunction  - 11/2 TTE: EF 22% and + LV thrombus  - IABP swapped 11/20 to RFA, continue 1:1 support  - Off Milrinone gtt @ 7:30 am 11/11  - James d/c'd due to elevated K levels  - c/w coreg 25 BID for GDMT  - HIT and HAIDER sent (12/3) as per HF   - UNOS status 2 as of 12/6  - 12/5 - reduced hydralazine 100 TID to 75 TID and ISD 40 TID to 30 TID for AL reduction    NSTEMI iso stent re-occlusion of pLAD and 100%  of RCA  - EKG on admission w/ LBBB  - DAPT: c/w ASA, Brilinta d/c'd per transplant w/u  - c/w lipitor 80  - cMR deferred given necessity of IABP  - CT sx not recommending CABG, undergoing AT eval    LV thrombus  - c/w heparin gtt w/ therapeutic PTT    ===================== RENAL =========================  Non-oliguric ARIC, resolved   - Baseline Cr: 1-1.22  - Renal US: no evidence of renal artery stenosis  - Trend BMP, lytes daily, replace as needed  - Continue Strict I/Os, avoid nephrotoxins    Mild Hyponatremia/Hyperkalemia iso ARIC  - Continue fluid restriction   - Urea powder d/c'd per renal  - Trend daily  - Continue monitoring urine output, lytes, SCr/ BUN  - Replete lytes prn with goal K >4 and Mg >2    =============== GASTROINTESTINAL===================  Constipation/ileus, resolved  - regular diet  - bowel reg prn    ===================ENDO====================  Type 2 DM  - A1c 8.3   - Continue lantus, premeal, low ISS  - continue FS    ===================HEMATOLOGIC/ONC ===================  - H/H & plts stable  - Monitor H/H and plts  - VTE PPX: heparin gtt    ==================INFECTIOUS DISEASE================  # Positive blood culture, resolved  - Repeat BCx drawn 11/30 for leukocytosis to 12k - positive on 12/4, G+ rods in anaerobic bottle, suspect contaminant  - Repeat cultures x2 sent 12/4 per transplant ID recs - no growth to date  - Vancomycin by trough (12/4-12/6)  - Final microbial ID: Cutibacterium acnes, per ID this is likely to be a skin contaminant, vanc dc'd.   - Dental eval 12/7 to rule out infectious etiology that would have led to bacteremia, no significant findings on exam.     # Enterococcus faecalis bacteremia, resolved  - BCx 11/17+ for enterococcus faecalis x2, Staph epi x1 (likely contaminant)- pan sensitive   - Urine cx 11/18 + enterococcus faecalis  - BCx 11/18 no growth   - IABP site swapped to RFA 11/20  - s/p Vancomycin 1g q12h (11/18-11/27)  - CT A/P negative for infectious pathology    # COVID, resolved  - Off airborne precautions 11/11    # Pre-transplant ID w/u   - Trend ID recs for serologies   - Colonoscopy 11/17 - normal   - Chest CT 11/17 - improved LLL aeration  - s/p immunizations    ==================DERMATOLOGY================  # Upper Extremity Rash  - Patient developed bilateral upper extremity rash after receiving Hepatitis A & B immunizations on 11/24  - Dermatology consulted 12/5 - not likely to be related to vanco  - Recommend clobetasol 0.05% BID to affected areas   - RVP repeated to rule out viral etiology, negative

## 2023-12-12 NOTE — PROGRESS NOTE ADULT - SUBJECTIVE AND OBJECTIVE BOX
Behavioral Cardiology Progress Note     History of Present Illness: Mr. Hardy is a 58 year-old man with history of HTN, CAD (1 stent in 2009), ICH (2008) presented on 11/1 with abnormal EKG. Patient presented to [facility] where he was found to have STEMI, recommended to get cath however patient did not want treatment at OSH so left and came to [facility]. EKG here with ST depression in lateral leads and elevation in anterior leads. Prior to C found to be tachycardic, dyspneic, intubated. LHC 11/1 with chronic total occlusion of LAD and RCA, with elevated RA and PA pressures. TTE 11/1 with severely decreased EF 32%, s/p IABP 11/1.     Social history: Mr. Martin Hardy is a 58-year-old [place],  male with three children, including two sons and three daughters. 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 His wife, Brynn, works as an  in a public high school in [facility]. Mr. Hardy reported three close friendships with men who live in [place]. Notably, one of these men, Dr. Brody Le, is Mr. Hardy’s healthcare proxy and PCP. Mr. Hardy also reported that these friends feel like family due to their closeness. Currently, Mr. Hardy lives in a condominium apartment in [place] with his wife and children. 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 Mr. Hardy noted his parents live in [place] as does one brother and one sister. Mr. Hardy has another sister, diagnosed with bipolar disorder, who lives in [place]. He described feeling distant from his siblings, as they lack a close relationship.      Substance use:    •	Tobacco: Denies current and past tobacco use.    •	Alcohol: Denies current and past alcohol use.   •	Drug: Denies current and past drug use.      Past psychiatric history: Mr. Hardy denied any history of self-harm and suicidal ideation, plan, or attempt. He also denied any history of violent thoughts or behaviors. Denies history of outpatient treatment with a therapist or psychiatrist. With this said, he reported a history of odd thinking related, health-related anxiety. For example, Mr. Hardy reported meeting with a physician about 12 years ago because he feared having a brain tumor. He believed that the presence of a brain tumor could explain his superior memory. Earlier this year, he revealed anxiety to his PCP, who prescribed Mr. Hardy Lorazepam (0.5mg PRN). Per medical chart, Mr. Hardy filled his prescription three separate times in the past year. He reported concern about dependency, given Lorazepam’s status as a controlled medication, and desire to avoid substance abuse.      Psychological assessment: Mood "good." Reported that he stopped working remotely during hospitalization. Described comfort in knowing that he is on the transplant list, reporting feeling no time pressure to receive an OHT. He described making "lemonade out of kelly" by ordering food, decorating his hospital room, and enjoying the company of visitors. He reported some anxiety regarding surgery, hoping for success, and worrying about his mental status following the procedure. This writer provider psychoeducation on mental status following surgery and the associated feeling of sleepiness. This writer also underscored the importance of walking prior to discharge. He denied depressive symptoms. He was receptive to supportive therapy and CBT strategies.    Mental Status Exam: Seen lying in bed on IABP on CICU. Awake, alert. Oriented x4. Well related. Maintained good eye contact. Speech was normal volume, rate, tone. Mood euthymic. Affect full range. Thought process goal directed, content focused on current health status. Denies s/i. Insight into disease good. Immediate judgement good.        Dx: Adjustment Disorder with anxiety    No absolute psychological contraindications for pursuing heart transplant/LVAD with following recommendations:     ·	Psychology will continue to follow for supportive therapy and CBT strategies to manage anxiety.  ·	Seen by psychiatry (11/8), will benefit from follow up.   ·	Maintain ongoing psychiatric follow-up.   ·	Holistic Nurse.   ·	Pet therapy if appropriate.   ·	LVAD and heart transplant support group information provided.       25 minutes spent on total patient encounter     Behavioral Cardiology Progress Note     History of Present Illness: Mr. Hardy is a 58 year-old man with history of HTN, CAD (1 stent in 2009), ICH (2008) presented on 11/1 with abnormal EKG. Patient presented to [facility] where he was found to have STEMI, recommended to get cath however patient did not want treatment at OSH so left and came to [facility]. EKG here with ST depression in lateral leads and elevation in anterior leads. Prior to C found to be tachycardic, dyspneic, intubated. LHC 11/1 with chronic total occlusion of LAD and RCA, with elevated RA and PA pressures. TTE 11/1 with severely decreased EF 32%, s/p IABP 11/1.     Social history: Mr. Martin Hardy is a 58-year-old [place] American,  male with three children, including two sons and three daughters. 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 His wife, Brynn, works as an  in a public high school in [facility]. Mr. Hardy reported three close friendships with men who live in [place]. Notably, one of these men, Dr. Brody Le, is Mr. Hardy’s healthcare proxy and PCP. Mr. Hardy also reported that these friends feel like family due to their closeness. Currently, Mr. Hardy lives in a condominium apartment in [place] with his wife and children. 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 Mr. Hardy noted his parents live in [place] as does one brother and one sister. Mr. Hardy has another sister, diagnosed with bipolar disorder, who lives in [place]. He described feeling distant from his siblings, as they lack a close relationship.      Substance use:    •	Tobacco: Denies current and past tobacco use.    •	Alcohol: Denies current and past alcohol use.   •	Drug: Denies current and past drug use.      Past psychiatric history: Mr. Hardy denied any history of self-harm and suicidal ideation, plan, or attempt. He also denied any history of violent thoughts or behaviors. Denies history of outpatient treatment with a therapist or psychiatrist. With this said, he reported a history of odd thinking related, health-related anxiety. For example, Mr. Hardy reported meeting with a physician about 12 years ago because he feared having a brain tumor. He believed that the presence of a brain tumor could explain his superior memory. Earlier this year, he revealed anxiety to his PCP, who prescribed Mr. Hardy Lorazepam (0.5mg PRN). Per medical chart, Mr. Hardy filled his prescription three separate times in the past year. He reported concern about dependency, given Lorazepam’s status as a controlled medication, and desire to avoid substance abuse.      Psychological assessment: Mood "good." Reported that he stopped working remotely during hospitalization. Described comfort in knowing that he is on the transplant list, reporting feeling no time pressure to receive an OHT. He described making "lemonade out of kelly" by ordering food, decorating his hospital room, and enjoying the company of visitors. He reported some anxiety regarding surgery, hoping for success, and worrying about his mental status following the procedure. This writer provider psychoeducation on mental status following surgery and the associated feeling of sleepiness. This writer also underscored the importance of walking prior to discharge. He denied depressive symptoms. He was receptive to supportive therapy and CBT strategies.    Mental Status Exam: Seen lying in bed on IABP on CICU. Awake, alert. Oriented x4. Well related. Maintained good eye contact. Speech was normal volume, rate, tone. Mood euthymic. Affect full range. Thought process goal directed, content focused on current health status. Denies s/i. Insight into disease good. Immediate judgement good.        Dx: Adjustment Disorder with anxiety    No absolute psychological contraindications for pursuing heart transplant/LVAD with following recommendations:     ·	Psychology will continue to follow for supportive therapy and CBT strategies to manage anxiety.  ·	Seen by psychiatry (11/8), will benefit from follow up.   ·	Maintain ongoing psychiatric follow-up.   ·	Holistic Nurse.   ·	Pet therapy if appropriate.   ·	LVAD and heart transplant support group information provided.       25 minutes spent on total patient encounter

## 2023-12-12 NOTE — PROGRESS NOTE ADULT - ASSESSMENT
59 yo M with PMHx of HTN, CAD w/ 1 stent in 2009, ICH (2008) presented on 11/1 with abn ekg. Patient presented to [facility] where he was found to have STEMI, recommended to get cath however patient did not want to get it there so he left and came here.   EKG here with ST depression in lateral leads and elevation in anterior leads   Prior to [facility] found to be tachycardic, dyspneic, intubated   C 11/1 with chronic total occlusion of LAD and RCA, with elevated RA and PA pressures  TTE 11/1 with severely decreased EF 32%, s/p IABP 11/1    RVP (11/1) Positive for COVID19  RVP (11/10) Negative  BCx (11/2, 11/4) NGTD  ETT Culture (11/2) NGTD  MRSA/MSSA PCR (11/2, 11/10) Negative  UA (11/10) 1 WBC    CXR (11/10) Clear Lungs  TTE (11/2) Left ventricular thrombus.    #Positive Blood Cultures (11/30 - Cutibacterium)  Growth this far from procurement raises question of contaminant  Given ID as Cutibacterium skin procurement contaminant is favored  Repeat blood cultures with NGTD  --No absolute ID contraindication to re-activate for transplant.   --Continue to follow CBC with diff  --Continue to follow temperature curve    #Rash  ?Secondary to Cefepime on 11/2?  ?Secondary to hepatitis vaccine?  ?Secondary to Vancomycin  --Appreciate Dermatology input  --Follow LFT's and CBC with Diff (For Eosinophil Counts)    #Enterococcus Bacteremia, Leukocytosis, Pre-Heart Transplant Evaluation Serologies  Concern for either central line or urinary source  CT A/P Noncontrast (11/18) No acute process  s/p ten-days of IV Vancomycin last dose received on 11/27    #Encounter to Vaccinate Patient  COVID19: COVID19 Infection This Admission. Would consider Vaccination in ~3 months  Influenza: declined  Pneumococcal: Recommend PCV20  HAV: received first dose 11/24  HBV: received first dose Heplisav 11/24  MMR: Not Mumps Immune, if >1 month until transplant would consider MMR dose  Varicella: Immune  Shingles: recommend Shingrix series  Tdap: received Tdap booster this admission    I will continue to follow. Please feel free to contact me with any further questions.    Silviano Manuel M.D.  [facility] Division of Infectious Disease  8AM-5PM Monday - Friday: Available on [employer] Teams  After Hours and Holidays (or if no response on [employer] Teams): Please contact the Infectious Diseases Office at [phone] 59 yo M with PMHx of HTN, CAD w/ 1 stent in 2009, ICH (2008) presented on 11/1 with abn ekg. Patient presented to [facility] where he was found to have STEMI, recommended to get cath however patient did not want to get it there so he left and came here.   EKG here with ST depression in lateral leads and elevation in anterior leads   Prior to [facility] found to be tachycardic, dyspneic, intubated   C 11/1 with chronic total occlusion of LAD and RCA, with elevated RA and PA pressures  TTE 11/1 with severely decreased EF 32%, s/p IABP 11/1    RVP (11/1) Positive for COVID19  RVP (11/10) Negative  BCx (11/2, 11/4) NGTD  ETT Culture (11/2) NGTD  MRSA/MSSA PCR (11/2, 11/10) Negative  UA (11/10) 1 WBC    CXR (11/10) Clear Lungs  TTE (11/2) Left ventricular thrombus.    #Positive Blood Cultures (11/30 - Cutibacterium)  Growth this far from procurement raises question of contaminant  Given ID as Cutibacterium skin procurement contaminant is favored  Repeat blood cultures with NGTD  --No absolute ID contraindication to re-activate for transplant.   --Continue to follow CBC with diff  --Continue to follow temperature curve    #Rash  ?Secondary to Cefepime on 11/2?  ?Secondary to hepatitis vaccine?  ?Secondary to Vancomycin  --Appreciate Dermatology input  --Follow LFT's and CBC with Diff (For Eosinophil Counts)    #Enterococcus Bacteremia, Leukocytosis, Pre-Heart Transplant Evaluation Serologies  Concern for either central line or urinary source  CT A/P Noncontrast (11/18) No acute process  s/p ten-days of IV Vancomycin last dose received on 11/27    #Encounter to Vaccinate Patient  COVID19: COVID19 Infection This Admission. Would consider Vaccination in ~3 months  Influenza: declined  Pneumococcal: Recommend PCV20  HAV: received first dose 11/24  HBV: received first dose Heplisav 11/24  MMR: Not Mumps Immune, if >1 month until transplant would consider MMR dose  Varicella: Immune  Shingles: recommend Shingrix series  Tdap: received Tdap booster this admission    I will continue to follow. Please feel free to contact me with any further questions.    Silviano Manuel M.D.  [facility] Division of Infectious Disease  8AM-5PM Monday - Friday: Available on [employer] Teams  After Hours and Holidays (or if no response on [employer] Teams): Please contact the Infectious Diseases Office at [phone]

## 2023-12-12 NOTE — PROGRESS NOTE ADULT - SUBJECTIVE AND OBJECTIVE BOX
DEVORAH VALENCIA  MRN-[id number]  Patient is a 58y old  Male who presents with a chief complaint of Cardiogenic shock (10 Dec 2023 20:20)    HPI:  pt seen and approx 1:30 pm  in [facility]    57yo M w/ hx HTN, CAD w/ 1 stent in , ICH () presenting with abn ekg. Patient presented to [facility] where he was found to have STEMI, recommended to get cath however patient did not want to get it there so it left and came here.  Patient initially had cough, congestion, fever, was placed on antibiotics on .  Started feeling nauseous and had a presyncopal event after which he presented to ED last night.  Had chest pain as well.  Chest pain is midsternal.  Not currently having chest pain.  Received 4 aspirin 30 min pta. (2023 15:11)      Hospital Course:    24 HOUR EVENTS:    REVIEW OF SYSTEMS:    CONSTITUTIONAL: No weakness, fevers or chills  EYES/ENT: No visual changes;  No vertigo or throat pain   NECK: No pain or stiffness  RESPIRATORY: No cough, wheezing, hemoptysis; No shortness of breath  CARDIOVASCULAR: No chest pain or palpitations  GASTROINTESTINAL: No abdominal or epigastric pain. No nausea, vomiting, or hematemesis; No diarrhea or constipation. No melena or hematochezia.  GENITOURINARY: No dysuria, frequency or hematuria  NEUROLOGICAL: No numbness or weakness  SKIN: No itching, rashes      ICU Vital Signs Last 24 Hrs  T(C): 36.8 (12 Dec 2023 17:00), Max: 37.1 (12 Dec 2023 03:00)  T(F): 98.2 (12 Dec 2023 17:00), Max: 98.7 (12 Dec 2023 03:00)  HR: 75 (12 Dec 2023 20:00) (75 - 104)  BP: --  BP(mean): --  ABP: --  ABP(mean): --  RR: 16 (12 Dec 2023 20:00) (16 - 27)  SpO2: 96% (12 Dec 2023 17:00) (96% - 100%)    O2 Parameters below as of 12 Dec 2023 18:00  Patient On (Oxygen Delivery Method): room air            CVP(mm Hg): --  CO: --  CI: --  PA: --  PA(mean): --  PA(direct): --  PCWP: --  LA: --  RA: --  SVR: --  SVRI: --  PVR: --  PVRI: --  I&O's Summary    11 Dec 2023 07:01  -  12 Dec 2023 07:00  --------------------------------------------------------  IN: 976 mL / OUT: 1950 mL / NET: -974 mL    12 Dec 2023 07:01  -  12 Dec 2023 20:36  --------------------------------------------------------  IN: 874 mL / OUT: 750 mL / NET: 124 mL        CAPILLARY BLOOD GLUCOSE    CAPILLARY BLOOD GLUCOSE      POCT Blood Glucose.: 162 mg/dL (12 Dec 2023 17:05)      PHYSICAL EXAM:  GENERAL: No acute distress, well-developed  HEAD:  Atraumatic, Normocephalic  EYES: EOMI, PERRLA, conjunctiva and sclera clear  NECK: Supple, no lymphadenopathy, no JVD  CHEST/LUNG: CTAB; No wheezes, rales, or rhonchi  HEART: Regular rate and rhythm. Normal S1/S2. No murmurs, rubs, or gallops  ABDOMEN: Soft, non-tender, non-distended; normal bowel sounds, no organomegaly  EXTREMITIES:  2+ peripheral pulses b/l, No clubbing, cyanosis, or edema  NEUROLOGY: A&O x 3, no focal deficits  SKIN: No rashes or lesions    ============================I/O===========================   I&O's Detail    11 Dec 2023 07:  -  12 Dec 2023 07:00  --------------------------------------------------------  IN:    Heparin: 336 mL    Oral Fluid: 640 mL  Total IN: 976 mL    OUT:    Voided (mL): 1950 mL  Total OUT: 1950 mL    Total NET: -974 mL      12 Dec 2023 07:01  -  12 Dec 2023 20:36  --------------------------------------------------------  IN:    Heparin: 154 mL    Oral Fluid: 720 mL  Total IN: 874 mL    OUT:    Voided (mL): 750 mL  Total OUT: 750 mL    Total NET: 124 mL        ============================ LABS =========================                        12.2   9.66  )-----------( 147      ( 12 Dec 2023 00:38 )             37.4         138  |  105  |  29<H>  ----------------------------<  129<H>  4.7   |  22  |  1.15    Ca    10.1      12 Dec 2023 00:36  Phos  3.7       Mg     1.8         TPro  7.2  /  Alb  4.0  /  TBili  0.3  /  DBili  x   /  AST  44<H>  /  ALT  147<H>  /  AlkPhos  62  12                LIVER FUNCTIONS - ( 12 Dec 2023 00:36 )  Alb: 4.0 g/dL / Pro: 7.2 g/dL / ALK PHOS: 62 U/L / ALT: 147 U/L / AST: 44 U/L / GGT: x           PT/INR - ( 12 Dec 2023 00:37 )   PT: 11.9 sec;   INR: 1.08 ratio         PTT - ( 12 Dec 2023 00:37 )  PTT:59.6 sec    Lactate, Blood: 1.6 mmol/L (12-12-23 @ 00:38)  Lactate, Blood: 1.1 mmol/L (23 @ 01:31)  Lactate, Blood: 1.9 mmol/L (12-10-23 @ 17:45)    Urinalysis Basic - ( 12 Dec 2023 00:36 )    Color: x / Appearance: x / SG: x / pH: x  Gluc: 129 mg/dL / Ketone: x  / Bili: x / Urobili: x   Blood: x / Protein: x / Nitrite: x   Leuk Esterase: x / RBC: x / WBC x   Sq Epi: x / Non Sq Epi: x / Bacteria: x      ======================Micro/Rad/Cardio=================  Telemtry: Reviewed   EKG: Reviewed  CXR: Reviewed  Culture: Reviewed   Echo:   Cath: Cardiac Cath Lab - Adult:   [facility]  Department of Cardiology  [address] [place] [address]  [phone]  Cath Lab Report -- Comprehensive Report  Patient: LD VALENCIA  Study date: 2017  Account number: [id number]  MR number: [id number]  : 1965  Gender: Male  Race: W  Case Physician(s):  Jairon Holguin M.D.  Referring Physician:  Luc Lynn M.D.  INDICATIONS: Unstable angina - CCS4.  HISTORY: The patient has a history of coronary artery disease. The patient  hashypertension and medication-treated dyslipidemia.  PROCEDURE:  --  Left heart catheterization with ventriculography.  --  Left coronary angiography.  --  Right coronary angiography.  TECHNIQUE: The risks and alternatives of the procedures and conscious  sedation were explained to the patient and informed consent was obtained.  Cardiac catheterization performed electively.  Local anesthetic given. Right radial artery access. A 6FR PRELUDE KIT was  inserted in the vessel. Left heart catheterization. Ventriculography was  performed. A 5FR FR4.0 EXPO catheter was utilized. Left coronary artery  angiography. The vessel was injected utilizing a 5FR FL3.5 EXPO catheter.  Right coronary artery angiography. The vessel was injected utilizing a 5FR  FR4.0 EXPO catheter. RADIATION EXPOSURE: 1.1 min.  CONTRAST GIVEN: Omnipaque 55 ml.  MEDICATIONS GIVEN: Midazolam, 1 mg, IV. Fentanyl, 25 mcg, IV. Verapamil  (Isoptin, Calan, Covera), 2.5 mg, IA. Heparin, 3000 units, IA.  VENTRICLES: Global left ventricular function was moderately depressed. EF  estimated was 40 %.  CORONARY VESSELS: The coronary circulation is right dominant.  LM:   --  LM: Normal.  LAD:   --  Proximal LAD: There was a 50 % stenosis.  CX:   --  Circumflex: Normal.  RCA:   --  Mid RCA: There was a 40 % stenosis.  --  Distal RCA: There was a 50 % stenosis.  COMPLICATIONS: There were no complications.  DIAGNOSTIC RECOMMENDATIONS: The patient should continue with the present  medications.  Prepared and signed by  Jairon Holguin M.D.  Signed 2017 12:20:13  HEMODYNAMIC TABLES  Pressures:  Baseline/ Room Air  Pressures:  - HR: 78  Pressures:  - Rhythm:  Pressures:  -- Aortic Pressure (S/D/M): --/--/99  Pressures:  -- Left Ventricle (s/edp): 157/39/--  Outputs:  Baseline/ Room Air  Outputs:  -- CALCULATIONS[age]  Outputs:  -- CALCULATIONS: Body Surface Area: 2.05  Outputs:  -- CALCULATIONS: Height in cm: 175.00  Outputs:  -- CALCULATIONS: Sex: Male  Outputs:  -- CALCULATIONS: Weight in k.40 (17 @ 21:55)    ======================================================  PAST MEDICAL & SURGICAL HISTORY:  HTN (hypertension)      CAD (coronary artery disease)  ; stent      Intracranial hemorrhage        Respiratory arrest        Myocardial infarction, unspecified MI type, unspecified artery      History of coronary artery stent placement        ====================ASSESSMENT ==============  58 male with HTN, CAD (s/p PCI ), HFrEF, CVA , and T2DM presenting with chest pressure and unknown tachycardia that was shocked x1, [facility]  found to have in-stent restenosis of pLAD and  of RCA with elevated RA and PA pressures and severely decreased. Admitted to CICU for management of cardiogenic shock and ADHF requiring IABP  -, with hospital course c/b vfib arrest requiring reinsertion of IABP. Not recommended for ATs per HF. Currently listed for transplant status 2.    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 AT evaluation launched 11/10, he is ABO A. He was discussed during TSC on  and was approved for listing, however was found to be Bacteremic with  Blc Cx growing mixed cultures Staph epi and Enterococcus faecalis and started on IV Vanco. Repeat cultures from  reveal NGTD and therefore was cleared by ID for listing.     He appears adequately supported on IABP at 1:1, electrically quiescent on oral Amiodarone. Cr is improving with holding diuretics. Labs otherwise notable for worsening thrombocytopenia. Awaiting suitable donor. Now with GM + rods in blood culture on  (reported on ), restarted on vancomycin, and status 7, repeat cultures now negative x48 hours (), now status 2 again.       ====================== NEUROLOGY=====================  Anxiety  - No Seroquel/antipsychotics since vfib arrest and prolonged QTC  - Psych Eval, recommended SSRI, but pt. refused   - Psych recommending atarax PRN  - Continue to monitor mental status    PT/Conditioning  - Continue band exercises while on bedrest s/t IABP    ==================== RESPIRATORY======================  Acute Hypoxemic Respiratory Failure  - s/p x2 intubations for cardiogenic pulm edema and the in setting of cardiac arrest, resolved - extubated 11/10  - Currently maintaining >95% sats on room air  - Continue incentive spirometry and monitoring of sp02    Asthma  - c/w albuterol, symbicort and spiriva  - On trelegy at home  - Continue to monitor SpO2 with goal >94%    ====================CARDIOVASCULAR==================  Vfib arrest i/s/o ischemia  - Lido gtt off   - PO Amio load - total of 5g per EP complete   - Amio dc'd  for rising LFTs  - Keep K > 4, Mag > 2.2     Cardiogenic shock requiring IABP (- , -)  - Likely 2/2 NSTEMI and ADHF  -  LHC: pLAD 100 % in-stent restenosis & mRCA, 100 %. PCWP 30. IABP placed.  -  TTE: LV dilated. EF 32 %. Regional WMAs present, mod (grade 2) LV diastolic dysfunction  -  TTE: EF 22% and + LV thrombus  - IABP swapped  to RFA, continue 1:1 support  - Off Milrinone gtt @ 7:30 am   - Blanket d/c'd due to elevated K levels  - c/w coreg 25 BID for GDMT  - HIT and HAIDER sent (12/3) as per HF   - UNOS status 2 as of   -  - reduced hydralazine 100 TID to 75 TID and ISD 40 TID to 30 TID for AL reduction    NSTEMI iso stent re-occlusion of pLAD and 100%  of RCA  - EKG on admission w/ LBBB  - DAPT: c/w ASA, Brilinta d/c'd per transplant w/u  - c/w lipitor 80  - cMR deferred given necessity of IABP  - CT sx not recommending CABG, undergoing AT eval    LV thrombus  - c/w heparin gtt w/ therapeutic PTT    ===================== RENAL =========================  Non-oliguric ARIC, resolved   - Baseline Cr: 1-  - Renal US: no evidence of renal artery stenosis  - Trend BMP, lytes daily, replace as needed  - Continue Strict I/Os, avoid nephrotoxins    Mild Hyponatremia/Hyperkalemia iso ARIC  - Continue fluid restriction   - Urea powder d/c'd per renal  - Trend daily  - Continue monitoring urine output, lytes, SCr/ BUN  - Replete lytes prn with goal K >4 and Mg >2    =============== GASTROINTESTINAL===================  Constipation/ileus, resolved  - regular diet  - bowel reg prn    ===================ENDO====================  Type 2 DM  - A1c 8.3   - Continue lantus, premeal, low ISS  - continue FS    ===================HEMATOLOGIC/ONC ===================  - H/H & plts stable  - Monitor H/H and plts  - VTE PPX: heparin gtt    ==================INFECTIOUS DISEASE================  Positive blood culture, resolved  - Repeat BCx drawn  for leukocytosis to 12k - positive on , G+ rods in anaerobic bottle, suspect contaminant  - Repeat cultures x2 sent  per transplant ID recs - no growth to date  - Vancomycin by trough (-)  - Final microbial ID: Cutibacterium acnes, per ID this is likely to be a skin contaminant, vanc dc'd.   - Dental eval  to rule out infectious etiology that would have led to bacteremia, no significant findings on exam.     Enterococcus faecalis bacteremia, resolved  - BCx + for enterococcus faecalis x2, Staph epi x1 (likely contaminant)- pan sensitive   - Urine cx  + enterococcus faecalis  - BCx  no growth   - IABP site swapped to RFA   - s/p Vancomycin 1g q12h (-)  - CT A/P negative for infectious pathology    COVID, resolved  - Off airborne precautions     Pre-transplant ID w/u   - Trend ID recs for serologies   - Colonoscopy  - normal   - Chest CT  - improved LLL aeration  - s/p immunizations    ==================DERMATOLOGY================  Upper Extremity Rash  - Patient developed bilateral upper extremity rash after receiving Hepatitis A & B immunizations on   - Dermatology consulted  - not likely to be related to vanco  - Recommend clobetasol 0.05% BID to affected areas   - RVP repeated to rule out viral etiology, negative      Patient requires continuous monitoring with bedside rhythm monitoring, pulse ox monitoring, and intermittent blood gas analysis. Care plan discussed with ICU care team. Patient remained critical and at risk for life threatening decompensation.  Patient seen, examined and plan discussed with CCU team during rounds.     I have personally provided ____ minutes of critical care time excluding time spent on separate procedures, in addition to initial critical care time provided by the CICU Attending, Dr. Her    By signing my name below, I, Rosalba Tapia, attest that this documentation has been prepared under the direction and in the presence of Kitty Braden NP   Electronically signed: Rosalba Phillips, 23 @ 20:36    I, Kitty Braden , personally performed the services described in this documentation. all medical record entries made by the scribe were at my direction and in my presence. I have reviewed the chart and agree that the record reflects my personal performance and is accurate and complete  Electronically signed: Kitty Braden NP        DEVORAH VALENCIA  MRN-[id number]  Patient is a 58y old  Male who presents with a chief complaint of Cardiogenic shock (10 Dec 2023 20:20)    HPI:  pt seen and approx 1:30 pm  in [facility]    59yo M w/ hx HTN, CAD w/ 1 stent in , ICH () presenting with abn ekg. Patient presented to [facility] where he was found to have STEMI, recommended to get cath however patient did not want to get it there so it left and came here.  Patient initially had cough, congestion, fever, was placed on antibiotics on .  Started feeling nauseous and had a presyncopal event after which he presented to ED last night.  Had chest pain as well.  Chest pain is midsternal.  Not currently having chest pain.  Received 4 aspirin 30 min pta. (2023 15:11)      Hospital Course:    24 HOUR EVENTS:    REVIEW OF SYSTEMS:    CONSTITUTIONAL: No weakness, fevers or chills  EYES/ENT: No visual changes;  No vertigo or throat pain   NECK: No pain or stiffness  RESPIRATORY: No cough, wheezing, hemoptysis; No shortness of breath  CARDIOVASCULAR: No chest pain or palpitations  GASTROINTESTINAL: No abdominal or epigastric pain. No nausea, vomiting, or hematemesis; No diarrhea or constipation. No melena or hematochezia.  GENITOURINARY: No dysuria, frequency or hematuria  NEUROLOGICAL: No numbness or weakness  SKIN: No itching, rashes      ICU Vital Signs Last 24 Hrs  T(C): 36.8 (12 Dec 2023 17:00), Max: 37.1 (12 Dec 2023 03:00)  T(F): 98.2 (12 Dec 2023 17:00), Max: 98.7 (12 Dec 2023 03:00)  HR: 75 (12 Dec 2023 20:00) (75 - 104)  BP: --  BP(mean): --  ABP: --  ABP(mean): --  RR: 16 (12 Dec 2023 20:00) (16 - 27)  SpO2: 96% (12 Dec 2023 17:00) (96% - 100%)    O2 Parameters below as of 12 Dec 2023 18:00  Patient On (Oxygen Delivery Method): room air            CVP(mm Hg): --  CO: --  CI: --  PA: --  PA(mean): --  PA(direct): --  PCWP: --  LA: --  RA: --  SVR: --  SVRI: --  PVR: --  PVRI: --  I&O's Summary    11 Dec 2023 07:01  -  12 Dec 2023 07:00  --------------------------------------------------------  IN: 976 mL / OUT: 1950 mL / NET: -974 mL    12 Dec 2023 07:01  -  12 Dec 2023 20:36  --------------------------------------------------------  IN: 874 mL / OUT: 750 mL / NET: 124 mL        CAPILLARY BLOOD GLUCOSE    CAPILLARY BLOOD GLUCOSE      POCT Blood Glucose.: 162 mg/dL (12 Dec 2023 17:05)      PHYSICAL EXAM:  GENERAL: No acute distress, well-developed  HEAD:  Atraumatic, Normocephalic  EYES: EOMI, PERRLA, conjunctiva and sclera clear  NECK: Supple, no lymphadenopathy, no JVD  CHEST/LUNG: CTAB; No wheezes, rales, or rhonchi  HEART: Regular rate and rhythm. Normal S1/S2. No murmurs, rubs, or gallops  ABDOMEN: Soft, non-tender, non-distended; normal bowel sounds, no organomegaly  EXTREMITIES:  2+ peripheral pulses b/l, No clubbing, cyanosis, or edema  NEUROLOGY: A&O x 3, no focal deficits  SKIN: No rashes or lesions    ============================I/O===========================   I&O's Detail    11 Dec 2023 07:  -  12 Dec 2023 07:00  --------------------------------------------------------  IN:    Heparin: 336 mL    Oral Fluid: 640 mL  Total IN: 976 mL    OUT:    Voided (mL): 1950 mL  Total OUT: 1950 mL    Total NET: -974 mL      12 Dec 2023 07:01  -  12 Dec 2023 20:36  --------------------------------------------------------  IN:    Heparin: 154 mL    Oral Fluid: 720 mL  Total IN: 874 mL    OUT:    Voided (mL): 750 mL  Total OUT: 750 mL    Total NET: 124 mL        ============================ LABS =========================                        12.2   9.66  )-----------( 147      ( 12 Dec 2023 00:38 )             37.4         138  |  105  |  29<H>  ----------------------------<  129<H>  4.7   |  22  |  1.15    Ca    10.1      12 Dec 2023 00:36  Phos  3.7       Mg     1.8         TPro  7.2  /  Alb  4.0  /  TBili  0.3  /  DBili  x   /  AST  44<H>  /  ALT  147<H>  /  AlkPhos  62  12                LIVER FUNCTIONS - ( 12 Dec 2023 00:36 )  Alb: 4.0 g/dL / Pro: 7.2 g/dL / ALK PHOS: 62 U/L / ALT: 147 U/L / AST: 44 U/L / GGT: x           PT/INR - ( 12 Dec 2023 00:37 )   PT: 11.9 sec;   INR: 1.08 ratio         PTT - ( 12 Dec 2023 00:37 )  PTT:59.6 sec    Lactate, Blood: 1.6 mmol/L (12-12-23 @ 00:38)  Lactate, Blood: 1.1 mmol/L (23 @ 01:31)  Lactate, Blood: 1.9 mmol/L (12-10-23 @ 17:45)    Urinalysis Basic - ( 12 Dec 2023 00:36 )    Color: x / Appearance: x / SG: x / pH: x  Gluc: 129 mg/dL / Ketone: x  / Bili: x / Urobili: x   Blood: x / Protein: x / Nitrite: x   Leuk Esterase: x / RBC: x / WBC x   Sq Epi: x / Non Sq Epi: x / Bacteria: x      ======================Micro/Rad/Cardio=================  Telemtry: Reviewed   EKG: Reviewed  CXR: Reviewed  Culture: Reviewed   Echo:   Cath: Cardiac Cath Lab - Adult:   [facility]  Department of Cardiology  [address] [place] [address]  [phone]  Cath Lab Report -- Comprehensive Report  Patient: LD VALENCIA  Study date: 2017  Account number: [id number]  MR number: [id number]  : 1965  Gender: Male  Race: W  Case Physician(s):  Jairon Holguin M.D.  Referring Physician:  Luc Lynn M.D.  INDICATIONS: Unstable angina - CCS4.  HISTORY: The patient has a history of coronary artery disease. The patient  hashypertension and medication-treated dyslipidemia.  PROCEDURE:  --  Left heart catheterization with ventriculography.  --  Left coronary angiography.  --  Right coronary angiography.  TECHNIQUE: The risks and alternatives of the procedures and conscious  sedation were explained to the patient and informed consent was obtained.  Cardiac catheterization performed electively.  Local anesthetic given. Right radial artery access. A 6FR PRELUDE KIT was  inserted in the vessel. Left heart catheterization. Ventriculography was  performed. A 5FR FR4.0 EXPO catheter was utilized. Left coronary artery  angiography. The vessel was injected utilizing a 5FR FL3.5 EXPO catheter.  Right coronary artery angiography. The vessel was injected utilizing a 5FR  FR4.0 EXPO catheter. RADIATION EXPOSURE: 1.1 min.  CONTRAST GIVEN: Omnipaque 55 ml.  MEDICATIONS GIVEN: Midazolam, 1 mg, IV. Fentanyl, 25 mcg, IV. Verapamil  (Isoptin, Calan, Covera), 2.5 mg, IA. Heparin, 3000 units, IA.  VENTRICLES: Global left ventricular function was moderately depressed. EF  estimated was 40 %.  CORONARY VESSELS: The coronary circulation is right dominant.  LM:   --  LM: Normal.  LAD:   --  Proximal LAD: There was a 50 % stenosis.  CX:   --  Circumflex: Normal.  RCA:   --  Mid RCA: There was a 40 % stenosis.  --  Distal RCA: There was a 50 % stenosis.  COMPLICATIONS: There were no complications.  DIAGNOSTIC RECOMMENDATIONS: The patient should continue with the present  medications.  Prepared and signed by  Jairon Holguin M.D.  Signed 2017 12:20:13  HEMODYNAMIC TABLES  Pressures:  Baseline/ Room Air  Pressures:  - HR: 78  Pressures:  - Rhythm:  Pressures:  -- Aortic Pressure (S/D/M): --/--/99  Pressures:  -- Left Ventricle (s/edp): 157/39/--  Outputs:  Baseline/ Room Air  Outputs:  -- CALCULATIONS[age]  Outputs:  -- CALCULATIONS: Body Surface Area: 2.05  Outputs:  -- CALCULATIONS: Height in cm: 175.00  Outputs:  -- CALCULATIONS: Sex: Male  Outputs:  -- CALCULATIONS: Weight in k.40 (17 @ 21:55)    ======================================================  PAST MEDICAL & SURGICAL HISTORY:  HTN (hypertension)      CAD (coronary artery disease)  ; stent      Intracranial hemorrhage        Respiratory arrest        Myocardial infarction, unspecified MI type, unspecified artery      History of coronary artery stent placement        ====================ASSESSMENT ==============  58 male with HTN, CAD (s/p PCI ), HFrEF, CVA , and T2DM presenting with chest pressure and unknown tachycardia that was shocked x1, [facility]  found to have in-stent restenosis of pLAD and  of RCA with elevated RA and PA pressures and severely decreased. Admitted to CICU for management of cardiogenic shock and ADHF requiring IABP  -, with hospital course c/b vfib arrest requiring reinsertion of IABP. Not recommended for ATs per HF. Currently listed for transplant status 2.    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 AT evaluation launched 11/10, he is ABO A. He was discussed during TSC on  and was approved for listing, however was found to be Bacteremic with  Blc Cx growing mixed cultures Staph epi and Enterococcus faecalis and started on IV Vanco. Repeat cultures from  reveal NGTD and therefore was cleared by ID for listing.     He appears adequately supported on IABP at 1:1, electrically quiescent on oral Amiodarone. Cr is improving with holding diuretics. Labs otherwise notable for worsening thrombocytopenia. Awaiting suitable donor. Now with GM + rods in blood culture on  (reported on ), restarted on vancomycin, and status 7, repeat cultures now negative x48 hours (), now status 2 again.       ====================== NEUROLOGY=====================  Anxiety  - No Seroquel/antipsychotics since vfib arrest and prolonged QTC  - Psych Eval, recommended SSRI, but pt. refused   - Psych recommending atarax PRN  - Continue to monitor mental status    PT/Conditioning  - Continue band exercises while on bedrest s/t IABP    ==================== RESPIRATORY======================  Acute Hypoxemic Respiratory Failure  - s/p x2 intubations for cardiogenic pulm edema and the in setting of cardiac arrest, resolved - extubated 11/10  - Currently maintaining >95% sats on room air  - Continue incentive spirometry and monitoring of sp02    Asthma  - c/w albuterol, symbicort and spiriva  - On trelegy at home  - Continue to monitor SpO2 with goal >94%    ====================CARDIOVASCULAR==================  Vfib arrest i/s/o ischemia  - Lido gtt off   - PO Amio load - total of 5g per EP complete   - Amio dc'd  for rising LFTs  - Keep K > 4, Mag > 2.2     Cardiogenic shock requiring IABP (- , -)  - Likely 2/2 NSTEMI and ADHF  -  LHC: pLAD 100 % in-stent restenosis & mRCA, 100 %. PCWP 30. IABP placed.  -  TTE: LV dilated. EF 32 %. Regional WMAs present, mod (grade 2) LV diastolic dysfunction  -  TTE: EF 22% and + LV thrombus  - IABP swapped  to RFA, continue 1:1 support  - Off Milrinone gtt @ 7:30 am   - [place] d/c'd due to elevated K levels  - c/w coreg 25 BID for GDMT  - HIT and HAIDER sent (12/3) as per HF   - UNOS status 2 as of   -  - reduced hydralazine 100 TID to 75 TID and ISD 40 TID to 30 TID for AL reduction    NSTEMI iso stent re-occlusion of pLAD and 100%  of RCA  - EKG on admission w/ LBBB  - DAPT: c/w ASA, Brilinta d/c'd per transplant w/u  - c/w lipitor 80  - cMR deferred given necessity of IABP  - CT sx not recommending CABG, undergoing AT eval    LV thrombus  - c/w heparin gtt w/ therapeutic PTT    ===================== RENAL =========================  Non-oliguric ARIC, resolved   - Baseline Cr: 1-  - Renal US: no evidence of renal artery stenosis  - Trend BMP, lytes daily, replace as needed  - Continue Strict I/Os, avoid nephrotoxins    Mild Hyponatremia/Hyperkalemia iso ARIC  - Continue fluid restriction   - Urea powder d/c'd per renal  - Trend daily  - Continue monitoring urine output, lytes, SCr/ BUN  - Replete lytes prn with goal K >4 and Mg >2    =============== GASTROINTESTINAL===================  Constipation/ileus, resolved  - regular diet  - bowel reg prn    ===================ENDO====================  Type 2 DM  - A1c 8.3   - Continue lantus, premeal, low ISS  - continue FS    ===================HEMATOLOGIC/ONC ===================  - H/H & plts stable  - Monitor H/H and plts  - VTE PPX: heparin gtt    ==================INFECTIOUS DISEASE================  Positive blood culture, resolved  - Repeat BCx drawn  for leukocytosis to 12k - positive on , G+ rods in anaerobic bottle, suspect contaminant  - Repeat cultures x2 sent  per transplant ID recs - no growth to date  - Vancomycin by trough (-)  - Final microbial ID: Cutibacterium acnes, per ID this is likely to be a skin contaminant, vanc dc'd.   - Dental eval  to rule out infectious etiology that would have led to bacteremia, no significant findings on exam.     Enterococcus faecalis bacteremia, resolved  - BCx + for enterococcus faecalis x2, Staph epi x1 (likely contaminant)- pan sensitive   - Urine cx  + enterococcus faecalis  - BCx  no growth   - IABP site swapped to RFA   - s/p Vancomycin 1g q12h (-)  - CT A/P negative for infectious pathology    COVID, resolved  - Off airborne precautions     Pre-transplant ID w/u   - Trend ID recs for serologies   - Colonoscopy  - normal   - Chest CT  - improved LLL aeration  - s/p immunizations    ==================DERMATOLOGY================  Upper Extremity Rash  - Patient developed bilateral upper extremity rash after receiving Hepatitis A & B immunizations on   - Dermatology consulted  - not likely to be related to vanco  - Recommend clobetasol 0.05% BID to affected areas   - RVP repeated to rule out viral etiology, negative      Patient requires continuous monitoring with bedside rhythm monitoring, pulse ox monitoring, and intermittent blood gas analysis. Care plan discussed with ICU care team. Patient remained critical and at risk for life threatening decompensation.  Patient seen, examined and plan discussed with CCU team during rounds.     I have personally provided ____ minutes of critical care time excluding time spent on separate procedures, in addition to initial critical care time provided by the CICU Attending, Dr. Her    By signing my name below, I, Rosalba Tapia, attest that this documentation has been prepared under the direction and in the presence of Kitty Braden NP   Electronically signed: Rosalba Phillips, 23 @ 20:36    I, Kitty Braden , personally performed the services described in this documentation. all medical record entries made by the scribe were at my direction and in my presence. I have reviewed the chart and agree that the record reflects my personal performance and is accurate and complete  Electronically signed: Kitty Braden NP        DEVORAH VALENCIA  MRN-[id number]  Patient is a 58y old  Male who presents with a chief complaint of Cardiogenic shock (10 Dec 2023 20:20)    HPI:  57 yo M with HFrEF (LVIDd 6.4 cm, LVEF 32%), CAD s/p PCI (), HTN, DMT2 (A1c 8.3%) and CVA s/p TPA (), recently treated for PNA who initially presented to [facility] via EMS after syncope reportedly requiring defibrilation. Treated for ACS but left AMA to come to [facility]. On arrival ECG with ST depression in lateral leads. Intubated  for respiratory failure. Found to have COVID. L/RHC evealing  of LAD and RCA, elevated filling pressures and CI of 1.5 prompting placement of IABP. Extubated 11/3. IABP was weaned to off , then the following day on , developed PMVT cardiac arrest with prompt CPR and defibrillation, started on IV Lidocaine and Amio. IABP ultimately replaced on  for worsening hypotension. TTE  revealing LV thrombus.     24 HOUR EVENTS: no acute events.    REVIEW OF SYSTEMS:  CONSTITUTIONAL: No weakness, fevers or chills  EYES/ENT: No visual changes;  No vertigo or throat pain   NECK: No pain or stiffness  RESPIRATORY: No cough, wheezing, hemoptysis; No shortness of breath  CARDIOVASCULAR: No chest pain or palpitations  GASTROINTESTINAL: No abdominal or epigastric pain. No nausea, vomiting, or hematemesis; No diarrhea or constipation. No melena or hematochezia.  GENITOURINARY: No dysuria, frequency or hematuria  NEUROLOGICAL: No numbness or weakness  SKIN: No itching, rashes      ICU Vital Signs Last 24 Hrs  T(C): 36.8 (12 Dec 2023 17:00), Max: 37.1 (12 Dec 2023 03:00)  T(F): 98.2 (12 Dec 2023 17:00), Max: 98.7 (12 Dec 2023 03:00)  HR: 75 (12 Dec 2023 20:00) (75 - 104)  BP: --  BP(mean): --  ABP: --  ABP(mean): --  RR: 16 (12 Dec 2023 20:00) (16 - 27)  SpO2: 96% (12 Dec 2023 17:00) (96% - 100%)    O2 Parameters below as of 12 Dec 2023 18:00  Patient On (Oxygen Delivery Method): room air    I&O's Summary    11 Dec 2023 07:01  -  12 Dec 2023 07:00  --------------------------------------------------------  IN: 976 mL / OUT: 1950 mL / NET: -974 mL    12 Dec 2023 07:01  -  12 Dec 2023 20:36  --------------------------------------------------------  IN: 874 mL / OUT: 750 mL / NET: 124 mL        CAPILLARY BLOOD GLUCOSE    CAPILLARY BLOOD GLUCOSE      POCT Blood Glucose.: 162 mg/dL (12 Dec 2023 17:05)      PHYSICAL EXAM:  GENERAL: No acute distress, well-developed  HEAD:  Atraumatic, Normocephalic  EYES: EOMI, PERRLA, conjunctiva and sclera clear  NECK: Supple, no lymphadenopathy, no JVD  CHEST/LUNG: CTAB; No wheezes, rales, or rhonchi  HEART: Regular rate and rhythm. Normal S1/S2. No murmurs, rubs, or gallops  ABDOMEN: Soft, non-tender, non-distended; normal bowel sounds  EXTREMITIES:  2+ peripheral pulses b/l, No clubbing, cyanosis, or edema  NEUROLOGY: A&O x 3, no focal deficits  SKIN: Warm, bilateral upper extremities with generalized rash. R femoral IABP dressing clean, dry and intact    ============================I/O===========================   I&O's Detail    11 Dec 2023 07:01  -  12 Dec 2023 07:00  --------------------------------------------------------  IN:    Heparin: 336 mL    Oral Fluid: 640 mL  Total IN: 976 mL    OUT:    Voided (mL): 1950 mL  Total OUT: 1950 mL    Total NET: -974 mL      12 Dec 2023 07:01  -  12 Dec 2023 20:36  --------------------------------------------------------  IN:    Heparin: 154 mL    Oral Fluid: 720 mL  Total IN: 874 mL    OUT:    Voided (mL): 750 mL  Total OUT: 750 mL    Total NET: 124 mL      ============================ LABS =========================                        12.2   9.66  )-----------( 147      ( 12 Dec 2023 00:38 )             37.4         138  |  105  |  29<H>  ----------------------------<  129<H>  4.7   |  22  |  1.15    Ca    10.1      12 Dec 2023 00:36  Phos  3.7       Mg     1.8         TPro  7.2  /  Alb  4.0  /  TBili  0.3  /  DBili  x   /  AST  44<H>  /  ALT  147<H>  /  AlkPhos  62  12        LIVER FUNCTIONS - ( 12 Dec 2023 00:36 )  Alb: 4.0 g/dL / Pro: 7.2 g/dL / ALK PHOS: 62 U/L / ALT: 147 U/L / AST: 44 U/L / GGT: x           PT/INR - ( 12 Dec 2023 00:37 )   PT: 11.9 sec;   INR: 1.08 ratio         PTT - ( 12 Dec 2023 00:37 )  PTT:59.6 sec    Lactate, Blood: 1.6 mmol/L (23 @ 00:38)  Lactate, Blood: 1.1 mmol/L (23 @ 01:31)  Lactate, Blood: 1.9 mmol/L (12-10-23 @ 17:45)    Urinalysis Basic - ( 12 Dec 2023 00:36 )    Color: x / Appearance: x / SG: x / pH: x  Gluc: 129 mg/dL / Ketone: x  / Bili: x / Urobili: x   Blood: x / Protein: x / Nitrite: x   Leuk Esterase: x / RBC: x / WBC x   Sq Epi: x / Non Sq Epi: x / Bacteria: x      ======================Micro/Rad/Cardio=================  Telemtry: Reviewed   EKG: Reviewed  CXR: Reviewed  Culture: Reviewed   Echo:   Cath: Cardiac Cath Lab - Adult:   [facility]  Department of Cardiology  [address] [place] [address]  [phone]  Cath Lab Report -- Comprehensive Report  Patient: LD VALENCIA  Study date: 2017  Account number: [id number]  MR number: [id number]  : 1965  Gender: Male  Race: W  Case Physician(s):  Jairon Holguin M.D.  Referring Physician:  Luc Lynn M.D.  INDICATIONS: Unstable angina - CCS4.  HISTORY: The patient has a history of coronary artery disease. The patient  hashypertension and medication-treated dyslipidemia.  PROCEDURE:  --  Left heart catheterization with ventriculography.  --  Left coronary angiography.  --  Right coronary angiography.  TECHNIQUE: The risks and alternatives of the procedures and conscious  sedation were explained to the patient and informed consent was obtained.  Cardiac catheterization performed electively.  Local anesthetic given. Right radial artery access. A 6FR PRELUDE KIT was  inserted in the vessel. Left heart catheterization. Ventriculography was  performed. A 5FR FR4.0 EXPO catheter was utilized. Left coronary artery  angiography. The vessel was injected utilizing a 5FR FL3.5 EXPO catheter.  Right coronary artery angiography. The vessel was injected utilizing a 5FR  FR4.0 EXPO catheter. RADIATION EXPOSURE: 1.1 min.  CONTRAST GIVEN: Omnipaque 55 ml.  MEDICATIONS GIVEN: Midazolam, 1 mg, IV. Fentanyl, 25 mcg, IV. Verapamil  (Isoptin, Calan, Covera), 2.5 mg, IA. Heparin, 3000 units, IA.  VENTRICLES: Global left ventricular function was moderately depressed. EF  estimated was 40 %.  CORONARY VESSELS: The coronary circulation is right dominant.  LM:   --  LM: Normal.  LAD:   --  Proximal LAD: There was a 50 % stenosis.  CX:   --  Circumflex: Normal.  RCA:   --  Mid RCA: There was a 40 % stenosis.  --  Distal RCA: There was a 50 % stenosis.  COMPLICATIONS: There were no complications.  DIAGNOSTIC RECOMMENDATIONS: The patient should continue with the present  medications.  Prepared and signed by  Jairon Holguin M.D.  Signed 2017 12:20:13  HEMODYNAMIC TABLES  Pressures:  Baseline/ Room Air  Pressures:  - HR: 78  Pressures:  - Rhythm:  Pressures:  -- Aortic Pressure (S/D/M): --/--/99  Pressures:  -- Left Ventricle (s/edp): 157/39/--  Outputs:  Baseline/ Room Air  Outputs:  -- CALCULATIONS[age]  Outputs:  -- CALCULATIONS: Body Surface Area: 2.05  Outputs:  -- CALCULATIONS: Height in cm: 175.00  Outputs:  -- CALCULATIONS: Sex: Male  Outputs:  -- CALCULATIONS: Weight in k.40 (17 @ 21:55)    ======================================================  PAST MEDICAL & SURGICAL HISTORY:  HTN (hypertension)      CAD (coronary artery disease)  ; stent      Intracranial hemorrhage        Respiratory arrest        Myocardial infarction, unspecified MI type, unspecified artery      History of coronary artery stent placement      ====================ASSESSMENT ==============  58 male with HTN, CAD (s/p PCI ), HFrEF, CVA , and T2DM presenting with chest pressure and unknown tachycardia that was shocked x1, [facility]  found to have in-stent restenosis of pLAD and  of RCA with elevated RA and PA pressures and severely decreased. Admitted to CICU for management of cardiogenic shock and ADHF requiring IABP  -, with hospital course c/b vfib arrest requiring reinsertion of IABP, currently undergoing OHT eval.    ====================== NEUROLOGY=====================  Anxiety  - No Seroquel/antipsychotics since vfib arrest and prolonged QTC  - Psych Eval, recommended SSRI, but pt. refused   - Psych recommending atarax PRN  - Continue to monitor mental status    PT/Conditioning  - Continue band exercises while on bedrest s/t IABP    ==================== RESPIRATORY======================  Acute Hypoxemic Respiratory Failure  - s/p x2 intubations for cardiogenic pulm edema and the in setting of cardiac arrest, resolved - extubated 11/10  - Currently maintaining >95% sats on room air  - Continue incentive spirometry and monitoring of sp02    Asthma  - c/w albuterol, symbicort and spiriva  - On trelegy at home  - Continue to monitor SpO2 with goal >94%    ====================CARDIOVASCULAR==================  Vfib arrest i/s/o ischemia  - [place] gtt off   - PO Amio load - total of 5g per EP complete   - Amio dc'd  for rising LFTs  - Keep K > 4, Mag > 2.2     Cardiogenic shock requiring IABP (- , -)  - Likely 2/2 NSTEMI and ADHF  -  LHC: pLAD 100 % in-stent restenosis & mRCA, 100 %. PCWP 30. IABP placed.  -  TTE: LV dilated. EF 32 %. Regional WMAs present, mod (grade 2) LV diastolic dysfunction  -  TTE: EF 22% and + LV thrombus  - IABP swapped  to RFA, continue 1:1 support  - Off Milrinone gtt @ 7:30 am   - [place] d/c'd due to elevated K levels  -  - reduced hydralazine 100 TID to 75 TID and ISD 40 TID to 30 TID for AL reduction  - c/w coreg 25 BID for GDMT  - UNOS status 2 as of     NSTEMI iso stent re-occlusion of pLAD and 100%  of RCA  - EKG on admission w/ LBBB  - DAPT: c/w ASA, Brilinta d/c'd per transplant w/u  - c/w lipitor 80  - cMR deferred given necessity of IABP  - CT sx not recommending CABG, undergoing AT eval    LV thrombus  - c/w heparin gtt w/ therapeutic PTT    ===================== RENAL =========================  Non-oliguric ARIC, resolved   - Baseline Cr: -  - Renal US: no evidence of renal artery stenosis  - Trend BMP, lytes daily, replace as needed  - Continue Strict I/Os, avoid nephrotoxins    Mild Hyponatremia/Hyperkalemia iso ARIC  - resolved  - Urea powder d/c'd per renal  - Trend daily  - Continue monitoring urine output, lytes, SCr/ BUN  - Replete lytes prn with goal K >4 and Mg >2    =============== GASTROINTESTINAL===================  Constipation/ileus, resolved  - regular diet  - bowel reg prn    ===================ENDO====================  Type 2 DM  - A1c 8.3, glucose controlled  - Continue lantus, premeal, low ISS  - continue FS    ===================HEMATOLOGIC/ONC ===================  - H/H & plts stable  - Monitor H/H and plts  - VTE PPX: heparin gtt    ==================INFECTIOUS DISEASE================  Positive blood culture, resolved  - Repeat BCx drawn  for leukocytosis to 12k - positive on , G+ rods in anaerobic bottle, suspect contaminant  - Repeat cultures x2 sent  per transplant ID recs - no growth to date  - Vancomycin by trough (-)  - Final microbial ID: Cutibacterium acnes, per ID this is likely to be a skin contaminant, vanc dc'd.   - Dental eval  to rule out infectious etiology that would have led to bacteremia, no significant findings on exam.     Enterococcus faecalis bacteremia, resolved  - BCx + for enterococcus faecalis x2, Staph epi x1 (likely contaminant)- pan sensitive   - Urine cx  + enterococcus faecalis  - BCx  no growth   - IABP site swapped to RFA   - s/p Vancomycin 1g q12h (-)  - CT A/P negative for infectious pathology    COVID, resolved  - Off airborne precautions     Pre-transplant ID w/u   - Trend ID recs for serologies   - Colonoscopy  - normal   - Chest CT  - improved LLL aeration  - s/p immunizations    ==================DERMATOLOGY================  Upper Extremity Rash  - Patient developed bilateral upper extremity rash after receiving Hepatitis A & B immunizations on   - Dermatology consulted  - not likely to be related to vanco  - Recommend clobetasol 0.05% BID to affected areas   - RVP repeated to rule out viral etiology, negative      Patient requires continuous monitoring with bedside rhythm monitoring, pulse ox monitoring, and intermittent blood gas analysis. Care plan discussed with ICU care team. Patient remained critical and at risk for life threatening decompensation.  Patient seen, examined and plan discussed with CCU team during rounds.     I have personally provided 30 minutes of critical care time excluding time spent on separate procedures, in addition to initial critical care time provided by the CICU Attending, Dr. Her    By signing my name below, I, Rosalba Tapia, attest that this documentation has been prepared under the direction and in the presence of Kitty Braden NP   Electronically signed: Rosalba Phillips, 23 @ 20:36    I, Kitty Braden , personally performed the services described in this documentation. all medical record entries made by the eileenibmartha were at my direction and in my presence. I have reviewed the chart and agree that the record reflects my personal performance and is accurate and complete  Electronically signed: Kitty Braden NP        DEVORAH VALENCIA  MRN-[id number]  Patient is a 58y old  Male who presents with a chief complaint of Cardiogenic shock (10 Dec 2023 20:20)    HPI:  57 yo M with HFrEF (LVIDd 6.4 cm, LVEF 32%), CAD s/p PCI (), HTN, DMT2 (A1c 8.3%) and CVA s/p TPA (), recently treated for PNA who initially presented to [facility] via EMS after syncope reportedly requiring defibrilation. Treated for ACS but left AMA to come to [facility]. On arrival ECG with ST depression in lateral leads. Intubated  for respiratory failure. Found to have COVID. L/RHC evealing  of LAD and RCA, elevated filling pressures and CI of 1.5 prompting placement of IABP. Extubated 11/3. IABP was weaned to off , then the following day on , developed PMVT cardiac arrest with prompt CPR and defibrillation, started on IV Lidocaine and Amio. IABP ultimately replaced on  for worsening hypotension. TTE  revealing LV thrombus.     24 HOUR EVENTS: no acute events.    REVIEW OF SYSTEMS:  CONSTITUTIONAL: No weakness, fevers or chills  EYES/ENT: No visual changes;  No vertigo or throat pain   NECK: No pain or stiffness  RESPIRATORY: No cough, wheezing, hemoptysis; No shortness of breath  CARDIOVASCULAR: No chest pain or palpitations  GASTROINTESTINAL: No abdominal or epigastric pain. No nausea, vomiting, or hematemesis; No diarrhea or constipation. No melena or hematochezia.  GENITOURINARY: No dysuria, frequency or hematuria  NEUROLOGICAL: No numbness or weakness  SKIN: No itching, rashes      ICU Vital Signs Last 24 Hrs  T(C): 36.8 (12 Dec 2023 17:00), Max: 37.1 (12 Dec 2023 03:00)  T(F): 98.2 (12 Dec 2023 17:00), Max: 98.7 (12 Dec 2023 03:00)  HR: 75 (12 Dec 2023 20:00) (75 - 104)  BP: --  BP(mean): --  ABP: --  ABP(mean): --  RR: 16 (12 Dec 2023 20:00) (16 - 27)  SpO2: 96% (12 Dec 2023 17:00) (96% - 100%)    O2 Parameters below as of 12 Dec 2023 18:00  Patient On (Oxygen Delivery Method): room air    I&O's Summary    11 Dec 2023 07:01  -  12 Dec 2023 07:00  --------------------------------------------------------  IN: 976 mL / OUT: 1950 mL / NET: -974 mL    12 Dec 2023 07:01  -  12 Dec 2023 20:36  --------------------------------------------------------  IN: 874 mL / OUT: 750 mL / NET: 124 mL        CAPILLARY BLOOD GLUCOSE    CAPILLARY BLOOD GLUCOSE      POCT Blood Glucose.: 162 mg/dL (12 Dec 2023 17:05)      PHYSICAL EXAM:  GENERAL: No acute distress, well-developed  HEAD:  Atraumatic, Normocephalic  EYES: EOMI, PERRLA, conjunctiva and sclera clear  NECK: Supple, no lymphadenopathy, no JVD  CHEST/LUNG: CTAB; No wheezes, rales, or rhonchi  HEART: Regular rate and rhythm. Normal S1/S2. No murmurs, rubs, or gallops  ABDOMEN: Soft, non-tender, non-distended; normal bowel sounds  EXTREMITIES:  2+ peripheral pulses b/l, No clubbing, cyanosis, or edema  NEUROLOGY: A&O x 3, no focal deficits  SKIN: Warm, bilateral upper extremities with generalized rash. R femoral IABP dressing clean, dry and intact    ============================I/O===========================   I&O's Detail    11 Dec 2023 07:01  -  12 Dec 2023 07:00  --------------------------------------------------------  IN:    Heparin: 336 mL    Oral Fluid: 640 mL  Total IN: 976 mL    OUT:    Voided (mL): 1950 mL  Total OUT: 1950 mL    Total NET: -974 mL      12 Dec 2023 07:01  -  12 Dec 2023 20:36  --------------------------------------------------------  IN:    Heparin: 154 mL    Oral Fluid: 720 mL  Total IN: 874 mL    OUT:    Voided (mL): 750 mL  Total OUT: 750 mL    Total NET: 124 mL      ============================ LABS =========================                        12.2   9.66  )-----------( 147      ( 12 Dec 2023 00:38 )             37.4         138  |  105  |  29<H>  ----------------------------<  129<H>  4.7   |  22  |  1.15    Ca    10.1      12 Dec 2023 00:36  Phos  3.7       Mg     1.8         TPro  7.2  /  Alb  4.0  /  TBili  0.3  /  DBili  x   /  AST  44<H>  /  ALT  147<H>  /  AlkPhos  62  12        LIVER FUNCTIONS - ( 12 Dec 2023 00:36 )  Alb: 4.0 g/dL / Pro: 7.2 g/dL / ALK PHOS: 62 U/L / ALT: 147 U/L / AST: 44 U/L / GGT: x           PT/INR - ( 12 Dec 2023 00:37 )   PT: 11.9 sec;   INR: 1.08 ratio         PTT - ( 12 Dec 2023 00:37 )  PTT:59.6 sec    Lactate, Blood: 1.6 mmol/L (23 @ 00:38)  Lactate, Blood: 1.1 mmol/L (23 @ 01:31)  Lactate, Blood: 1.9 mmol/L (12-10-23 @ 17:45)    Urinalysis Basic - ( 12 Dec 2023 00:36 )    Color: x / Appearance: x / SG: x / pH: x  Gluc: 129 mg/dL / Ketone: x  / Bili: x / Urobili: x   Blood: x / Protein: x / Nitrite: x   Leuk Esterase: x / RBC: x / WBC x   Sq Epi: x / Non Sq Epi: x / Bacteria: x      ======================Micro/Rad/Cardio=================  Telemtry: Reviewed   EKG: Reviewed  CXR: Reviewed  Culture: Reviewed   Echo:   Cath: Cardiac Cath Lab - Adult:   [facility]  Department of Cardiology  [address] [place] [address]  [phone]  Cath Lab Report -- Comprehensive Report  Patient: LD VALENCIA  Study date: 2017  Account number: [id number]  MR number: [id number]  : 1965  Gender: Male  Race: W  Case Physician(s):  Jairon Holguin M.D.  Referring Physician:  Luc Lynn M.D.  INDICATIONS: Unstable angina - CCS4.  HISTORY: The patient has a history of coronary artery disease. The patient  hashypertension and medication-treated dyslipidemia.  PROCEDURE:  --  Left heart catheterization with ventriculography.  --  Left coronary angiography.  --  Right coronary angiography.  TECHNIQUE: The risks and alternatives of the procedures and conscious  sedation were explained to the patient and informed consent was obtained.  Cardiac catheterization performed electively.  Local anesthetic given. Right radial artery access. A 6FR PRELUDE KIT was  inserted in the vessel. Left heart catheterization. Ventriculography was  performed. A 5FR FR4.0 EXPO catheter was utilized. Left coronary artery  angiography. The vessel was injected utilizing a 5FR FL3.5 EXPO catheter.  Right coronary artery angiography. The vessel was injected utilizing a 5FR  FR4.0 EXPO catheter. RADIATION EXPOSURE: 1.1 min.  CONTRAST GIVEN: Omnipaque 55 ml.  MEDICATIONS GIVEN: Midazolam, 1 mg, IV. Fentanyl, 25 mcg, IV. Verapamil  (Isoptin, Calan, Covera), 2.5 mg, IA. Heparin, 3000 units, IA.  VENTRICLES: Global left ventricular function was moderately depressed. EF  estimated was 40 %.  CORONARY VESSELS: The coronary circulation is right dominant.  LM:   --  LM: Normal.  LAD:   --  Proximal LAD: There was a 50 % stenosis.  CX:   --  Circumflex: Normal.  RCA:   --  Mid RCA: There was a 40 % stenosis.  --  Distal RCA: There was a 50 % stenosis.  COMPLICATIONS: There were no complications.  DIAGNOSTIC RECOMMENDATIONS: The patient should continue with the present  medications.  Prepared and signed by  Jairon Holguin M.D.  Signed 2017 12:20:13  HEMODYNAMIC TABLES  Pressures:  Baseline/ Room Air  Pressures:  - HR: 78  Pressures:  - Rhythm:  Pressures:  -- Aortic Pressure (S/D/M): --/--/99  Pressures:  -- Left Ventricle (s/edp): 157/39/--  Outputs:  Baseline/ Room Air  Outputs:  -- CALCULATIONS[age]  Outputs:  -- CALCULATIONS: Body Surface Area: 2.05  Outputs:  -- CALCULATIONS: Height in cm: 175.00  Outputs:  -- CALCULATIONS: Sex: Male  Outputs:  -- CALCULATIONS: Weight in k.40 (17 @ 21:55)    ======================================================  PAST MEDICAL & SURGICAL HISTORY:  HTN (hypertension)      CAD (coronary artery disease)  ; stent      Intracranial hemorrhage        Respiratory arrest        Myocardial infarction, unspecified MI type, unspecified artery      History of coronary artery stent placement      ====================ASSESSMENT ==============  58 male with HTN, CAD (s/p PCI ), HFrEF, CVA , and T2DM presenting with chest pressure and unknown tachycardia that was shocked x1, [facility]  found to have in-stent restenosis of pLAD and  of RCA with elevated RA and PA pressures and severely decreased. Admitted to CICU for management of cardiogenic shock and ADHF requiring IABP  -, with hospital course c/b vfib arrest requiring reinsertion of IABP, currently undergoing OHT eval.    ====================== NEUROLOGY=====================  Anxiety  - No Seroquel/antipsychotics since vfib arrest and prolonged QTC  - Psych Eval, recommended SSRI, but pt. refused   - Psych recommending atarax PRN  - Continue to monitor mental status    PT/Conditioning  - Continue band exercises while on bedrest s/t IABP    ==================== RESPIRATORY======================  Acute Hypoxemic Respiratory Failure  - s/p x2 intubations for cardiogenic pulm edema and the in setting of cardiac arrest, resolved - extubated 11/10  - Currently maintaining >95% sats on room air  - Continue incentive spirometry and monitoring of sp02    Asthma  - c/w albuterol, symbicort and spiriva  - On trelegy at home  - Continue to monitor SpO2 with goal >94%    ====================CARDIOVASCULAR==================  Vfib arrest i/s/o ischemia  - Lido gtt off   - PO Amio load - total of 5g per EP complete   - Amio dc'd  for rising LFTs  - Keep K > 4, Mag > 2.2     Cardiogenic shock requiring IABP (- , -)  - Likely 2/2 NSTEMI and ADHF  -  LHC: pLAD 100 % in-stent restenosis & mRCA, 100 %. PCWP 30. IABP placed.  -  TTE: LV dilated. EF 32 %. Regional WMAs present, mod (grade 2) LV diastolic dysfunction  -  TTE: EF 22% and + LV thrombus  - IABP swapped  to RFA, continue 1:1 support  - Off Milrinone gtt @ 7:30 am   - [employer] d/c'd due to elevated K levels  -  - reduced hydralazine 100 TID to 75 TID and ISD 40 TID to 30 TID for AL reduction  - c/w coreg 25 BID for GDMT  - UNOS status 2 as of     NSTEMI iso stent re-occlusion of pLAD and 100%  of RCA  - EKG on admission w/ LBBB  - DAPT: c/w ASA, Brilinta d/c'd per transplant w/u  - c/w lipitor 80  - cMR deferred given necessity of IABP  - CT sx not recommending CABG, undergoing AT eval    LV thrombus  - c/w heparin gtt w/ therapeutic PTT    ===================== RENAL =========================  Non-oliguric ARIC, resolved   - Baseline Cr: -  - Renal US: no evidence of renal artery stenosis  - Trend BMP, lytes daily, replace as needed  - Continue Strict I/Os, avoid nephrotoxins    Mild Hyponatremia/Hyperkalemia iso ARIC  - resolved  - Urea powder d/c'd per renal  - Trend daily  - Continue monitoring urine output, lytes, SCr/ BUN  - Replete lytes prn with goal K >4 and Mg >2    =============== GASTROINTESTINAL===================  Constipation/ileus, resolved  - regular diet  - bowel reg prn    ===================ENDO====================  Type 2 DM  - A1c 8.3, glucose controlled  - Continue lantus, premeal, low ISS  - continue FS    ===================HEMATOLOGIC/ONC ===================  - H/H & plts stable  - Monitor H/H and plts  - VTE PPX: heparin gtt    ==================INFECTIOUS DISEASE================  Positive blood culture, resolved  - Repeat BCx drawn  for leukocytosis to 12k - positive on , G+ rods in anaerobic bottle, suspect contaminant  - Repeat cultures x2 sent  per transplant ID recs - no growth to date  - Vancomycin by trough (-)  - Final microbial ID: Cutibacterium acnes, per ID this is likely to be a skin contaminant, vanc dc'd.   - Dental eval  to rule out infectious etiology that would have led to bacteremia, no significant findings on exam.     Enterococcus faecalis bacteremia, resolved  - BCx + for enterococcus faecalis x2, Staph epi x1 (likely contaminant)- pan sensitive   - Urine cx  + enterococcus faecalis  - BCx  no growth   - IABP site swapped to RFA   - s/p Vancomycin 1g q12h (-)  - CT A/P negative for infectious pathology    COVID, resolved  - Off airborne precautions     Pre-transplant ID w/u   - Trend ID recs for serologies   - Colonoscopy  - normal   - Chest CT  - improved LLL aeration  - s/p immunizations    ==================DERMATOLOGY================  Upper Extremity Rash  - Patient developed bilateral upper extremity rash after receiving Hepatitis A & B immunizations on   - Dermatology consulted  - not likely to be related to vanco  - Recommend clobetasol 0.05% BID to affected areas   - RVP repeated to rule out viral etiology, negative      Patient requires continuous monitoring with bedside rhythm monitoring, pulse ox monitoring, and intermittent blood gas analysis. Care plan discussed with ICU care team. Patient remained critical and at risk for life threatening decompensation.  Patient seen, examined and plan discussed with CCU team during rounds.     I have personally provided 30 minutes of critical care time excluding time spent on separate procedures, in addition to initial critical care time provided by the CICU Attending, Dr. Her    By signing my name below, I, Rosalba Tapia, attest that this documentation has been prepared under the direction and in the presence of Kitty Braden NP   Electronically signed: Rosalba Phillips, 23 @ 20:36    I, Kitty Braden , personally performed the services described in this documentation. all medical record entries made by the eileenibmartha were at my direction and in my presence. I have reviewed the chart and agree that the record reflects my personal performance and is accurate and complete  Electronically signed: Kitty Braden NP        DEVORAH VALENCIA  MRN-[id number]  Patient is a 58y old  Male who presents with a chief complaint of Cardiogenic shock (10 Dec 2023 20:20)    HPI:  57 yo M with HFrEF (LVIDd 6.4 cm, LVEF 32%), CAD s/p PCI (), HTN, DMT2 (A1c 8.3%) and CVA s/p TPA (), recently treated for PNA who initially presented to [facility] via EMS after syncope reportedly requiring defibrilation. Treated for ACS but left AMA to come to [facility]. On arrival ECG with ST depression in lateral leads. Intubated  for respiratory failure. Found to have COVID. L/RHC evealing  of LAD and RCA, elevated filling pressures and CI of 1.5 prompting placement of IABP. Extubated 11/3. IABP was weaned to off , then the following day on , developed PMVT cardiac arrest with prompt CPR and defibrillation, started on IV Lidocaine and Amio. IABP ultimately replaced on  for worsening hypotension. TTE  revealing LV thrombus.     24 HOUR EVENTS: no acute events.    REVIEW OF SYSTEMS:  CONSTITUTIONAL: No weakness, fevers or chills  EYES/ENT: No visual changes;  No vertigo or throat pain   NECK: No pain or stiffness  RESPIRATORY: No cough, wheezing, hemoptysis; No shortness of breath  CARDIOVASCULAR: No chest pain or palpitations  GASTROINTESTINAL: No abdominal or epigastric pain. No nausea, vomiting, or hematemesis; No diarrhea or constipation. No melena or hematochezia.  GENITOURINARY: No dysuria, frequency or hematuria  NEUROLOGICAL: No numbness or weakness  SKIN: No itching, rashes      ICU Vital Signs Last 24 Hrs  T(C): 36.8 (12 Dec 2023 17:00), Max: 37.1 (12 Dec 2023 03:00)  T(F): 98.2 (12 Dec 2023 17:00), Max: 98.7 (12 Dec 2023 03:00)  HR: 75 (12 Dec 2023 20:00) (75 - 104)  BP: --  BP(mean): --  ABP: --  ABP(mean): --  RR: 16 (12 Dec 2023 20:00) (16 - 27)  SpO2: 96% (12 Dec 2023 17:00) (96% - 100%)    O2 Parameters below as of 12 Dec 2023 18:00  Patient On (Oxygen Delivery Method): room air    I&O's Summary    11 Dec 2023 07:01  -  12 Dec 2023 07:00  --------------------------------------------------------  IN: 976 mL / OUT: 1950 mL / NET: -974 mL    12 Dec 2023 07:01  -  12 Dec 2023 20:36  --------------------------------------------------------  IN: 874 mL / OUT: 750 mL / NET: 124 mL        CAPILLARY BLOOD GLUCOSE    CAPILLARY BLOOD GLUCOSE      POCT Blood Glucose.: 162 mg/dL (12 Dec 2023 17:05)      PHYSICAL EXAM:  GENERAL: No acute distress, well-developed  HEAD:  Atraumatic, Normocephalic  EYES: EOMI, PERRLA, conjunctiva and sclera clear  NECK: Supple, no lymphadenopathy, no JVD  CHEST/LUNG: CTAB; No wheezes, rales, or rhonchi  HEART: Regular rate and rhythm. Normal S1/S2. No murmurs, rubs, or gallops  ABDOMEN: Soft, non-tender, non-distended; normal bowel sounds  EXTREMITIES:  2+ peripheral pulses b/l, No clubbing, cyanosis, or edema  NEUROLOGY: A&O x 3, no focal deficits  SKIN: Warm, bilateral upper extremities with generalized rash. R femoral IABP dressing clean, dry and intact    ============================I/O===========================   I&O's Detail    11 Dec 2023 07:01  -  12 Dec 2023 07:00  --------------------------------------------------------  IN:    Heparin: 336 mL    Oral Fluid: 640 mL  Total IN: 976 mL    OUT:    Voided (mL): 1950 mL  Total OUT: 1950 mL    Total NET: -974 mL      12 Dec 2023 07:01  -  12 Dec 2023 20:36  --------------------------------------------------------  IN:    Heparin: 154 mL    Oral Fluid: 720 mL  Total IN: 874 mL    OUT:    Voided (mL): 750 mL  Total OUT: 750 mL    Total NET: 124 mL      ============================ LABS =========================                        12.2   9.66  )-----------( 147      ( 12 Dec 2023 00:38 )             37.4         138  |  105  |  29<H>  ----------------------------<  129<H>  4.7   |  22  |  1.15    Ca    10.1      12 Dec 2023 00:36  Phos  3.7       Mg     1.8         TPro  7.2  /  Alb  4.0  /  TBili  0.3  /  DBili  x   /  AST  44<H>  /  ALT  147<H>  /  AlkPhos  62  12        LIVER FUNCTIONS - ( 12 Dec 2023 00:36 )  Alb: 4.0 g/dL / Pro: 7.2 g/dL / ALK PHOS: 62 U/L / ALT: 147 U/L / AST: 44 U/L / GGT: x           PT/INR - ( 12 Dec 2023 00:37 )   PT: 11.9 sec;   INR: 1.08 ratio         PTT - ( 12 Dec 2023 00:37 )  PTT:59.6 sec    Lactate, Blood: 1.6 mmol/L (23 @ 00:38)  Lactate, Blood: 1.1 mmol/L (23 @ 01:31)  Lactate, Blood: 1.9 mmol/L (12-10-23 @ 17:45)    Urinalysis Basic - ( 12 Dec 2023 00:36 )    Color: x / Appearance: x / SG: x / pH: x  Gluc: 129 mg/dL / Ketone: x  / Bili: x / Urobili: x   Blood: x / Protein: x / Nitrite: x   Leuk Esterase: x / RBC: x / WBC x   Sq Epi: x / Non Sq Epi: x / Bacteria: x      ======================Micro/Rad/Cardio=================  Telemtry: Reviewed   EKG: Reviewed  CXR: Reviewed  Culture: Reviewed   Echo:   Cath: Cardiac Cath Lab - Adult:   [facility]  Department of Cardiology  [address] [place] [address]  [phone]  Cath Lab Report -- Comprehensive Report  Patient: LD VALENCIA  Study date: 2017  Account number: [id number]  MR number: [id number]  : 1965  Gender: Male  Race: W  Case Physician(s):  Jairon Holguin M.D.  Referring Physician:  Luc Lynn M.D.  INDICATIONS: Unstable angina - CCS4.  HISTORY: The patient has a history of coronary artery disease. The patient  hashypertension and medication-treated dyslipidemia.  PROCEDURE:  --  Left heart catheterization with ventriculography.  --  Left coronary angiography.  --  Right coronary angiography.  TECHNIQUE: The risks and alternatives of the procedures and conscious  sedation were explained to the patient and informed consent was obtained.  Cardiac catheterization performed electively.  Local anesthetic given. Right radial artery access. A 6FR PRELUDE KIT was  inserted in the vessel. Left heart catheterization. Ventriculography was  performed. A 5FR FR4.0 EXPO catheter was utilized. Left coronary artery  angiography. The vessel was injected utilizing a 5FR FL3.5 EXPO catheter.  Right coronary artery angiography. The vessel was injected utilizing a 5FR  FR4.0 EXPO catheter. RADIATION EXPOSURE: 1.1 min.  CONTRAST GIVEN: Omnipaque 55 ml.  MEDICATIONS GIVEN: Midazolam, 1 mg, IV. Fentanyl, 25 mcg, IV. Verapamil  (Isoptin, Calan, Covera), 2.5 mg, IA. Heparin, 3000 units, IA.  VENTRICLES: Global left ventricular function was moderately depressed. EF  estimated was 40 %.  CORONARY VESSELS: The coronary circulation is right dominant.  LM:   --  LM: Normal.  LAD:   --  Proximal LAD: There was a 50 % stenosis.  CX:   --  Circumflex: Normal.  RCA:   --  Mid RCA: There was a 40 % stenosis.  --  Distal RCA: There was a 50 % stenosis.  COMPLICATIONS: There were no complications.  DIAGNOSTIC RECOMMENDATIONS: The patient should continue with the present  medications.  Prepared and signed by  Jairon Holguin M.D.  Signed 2017 12:20:13  HEMODYNAMIC TABLES  Pressures:  Baseline/ Room Air  Pressures:  - HR: 78  Pressures:  - Rhythm:  Pressures:  -- Aortic Pressure (S/D/M): --/--/99  Pressures:  -- Left Ventricle (s/edp): 157/39/--  Outputs:  Baseline/ Room Air  Outputs:  -- CALCULATIONS[age]  Outputs:  -- CALCULATIONS: Body Surface Area: 2.05  Outputs:  -- CALCULATIONS: Height in cm: 175.00  Outputs:  -- CALCULATIONS: Sex: Male  Outputs:  -- CALCULATIONS: Weight in k.40 (17 @ 21:55)    ======================================================  PAST MEDICAL & SURGICAL HISTORY:  HTN (hypertension)      CAD (coronary artery disease)  ; stent      Intracranial hemorrhage        Respiratory arrest        Myocardial infarction, unspecified MI type, unspecified artery      History of coronary artery stent placement         DEVORAH VALENCIA  MRN-[id number]  Patient is a 58y old  Male who presents with a chief complaint of Cardiogenic shock (10 Dec 2023 20:20)    HPI:  59 yo M with HFrEF (LVIDd 6.4 cm, LVEF 32%), CAD s/p PCI (), HTN, DMT2 (A1c 8.3%) and CVA s/p TPA (), recently treated for PNA who initially presented to [facility] via EMS after syncope reportedly requiring defibrilation. Treated for ACS but left AMA to come to [facility]. On arrival ECG with ST depression in lateral leads. Intubated  for respiratory failure. Found to have COVID. L/RHC evealing  of LAD and RCA, elevated filling pressures and CI of 1.5 prompting placement of IABP. Extubated 11/3. IABP was weaned to off , then the following day on , developed PMVT cardiac arrest with prompt CPR and defibrillation, started on IV Lidocaine and Amio. IABP ultimately replaced on  for worsening hypotension. TTE  revealing LV thrombus.     24 HOUR EVENTS: no acute events.    REVIEW OF SYSTEMS:  CONSTITUTIONAL: No weakness, fevers or chills  EYES/ENT: No visual changes;  No vertigo or throat pain   NECK: No pain or stiffness  RESPIRATORY: No cough, wheezing, hemoptysis; No shortness of breath  CARDIOVASCULAR: No chest pain or palpitations  GASTROINTESTINAL: No abdominal or epigastric pain. No nausea, vomiting, or hematemesis; No diarrhea or constipation. No melena or hematochezia.  GENITOURINARY: No dysuria, frequency or hematuria  NEUROLOGICAL: No numbness or weakness  SKIN: No itching, rashes      ICU Vital Signs Last 24 Hrs  T(C): 36.8 (12 Dec 2023 17:00), Max: 37.1 (12 Dec 2023 03:00)  T(F): 98.2 (12 Dec 2023 17:00), Max: 98.7 (12 Dec 2023 03:00)  HR: 75 (12 Dec 2023 20:00) (75 - 104)  BP: --  BP(mean): --  ABP: --  ABP(mean): --  RR: 16 (12 Dec 2023 20:00) (16 - 27)  SpO2: 96% (12 Dec 2023 17:00) (96% - 100%)    O2 Parameters below as of 12 Dec 2023 18:00  Patient On (Oxygen Delivery Method): room air    I&O's Summary    11 Dec 2023 07:01  -  12 Dec 2023 07:00  --------------------------------------------------------  IN: 976 mL / OUT: 1950 mL / NET: -974 mL    12 Dec 2023 07:01  -  12 Dec 2023 20:36  --------------------------------------------------------  IN: 874 mL / OUT: 750 mL / NET: 124 mL        CAPILLARY BLOOD GLUCOSE    CAPILLARY BLOOD GLUCOSE      POCT Blood Glucose.: 162 mg/dL (12 Dec 2023 17:05)      PHYSICAL EXAM:  GENERAL: No acute distress, well-developed  HEAD:  Atraumatic, Normocephalic  EYES: EOMI, PERRLA, conjunctiva and sclera clear  NECK: Supple, no lymphadenopathy, no JVD  CHEST/LUNG: CTAB; No wheezes, rales, or rhonchi  HEART: Regular rate and rhythm. Normal S1/S2. No murmurs, rubs, or gallops  ABDOMEN: Soft, non-tender, non-distended; normal bowel sounds  EXTREMITIES:  2+ peripheral pulses b/l, No clubbing, cyanosis, or edema  NEUROLOGY: A&O x 3, no focal deficits  SKIN: Warm, bilateral upper extremities with generalized rash. R femoral IABP dressing clean, dry and intact    ============================I/O===========================   I&O's Detail    11 Dec 2023 07:01  -  12 Dec 2023 07:00  --------------------------------------------------------  IN:    Heparin: 336 mL    Oral Fluid: 640 mL  Total IN: 976 mL    OUT:    Voided (mL): 1950 mL  Total OUT: 1950 mL    Total NET: -974 mL      12 Dec 2023 07:01  -  12 Dec 2023 20:36  --------------------------------------------------------  IN:    Heparin: 154 mL    Oral Fluid: 720 mL  Total IN: 874 mL    OUT:    Voided (mL): 750 mL  Total OUT: 750 mL    Total NET: 124 mL      ============================ LABS =========================                        12.2   9.66  )-----------( 147      ( 12 Dec 2023 00:38 )             37.4         138  |  105  |  29<H>  ----------------------------<  129<H>  4.7   |  22  |  1.15    Ca    10.1      12 Dec 2023 00:36  Phos  3.7       Mg     1.8         TPro  7.2  /  Alb  4.0  /  TBili  0.3  /  DBili  x   /  AST  44<H>  /  ALT  147<H>  /  AlkPhos  62  12        LIVER FUNCTIONS - ( 12 Dec 2023 00:36 )  Alb: 4.0 g/dL / Pro: 7.2 g/dL / ALK PHOS: 62 U/L / ALT: 147 U/L / AST: 44 U/L / GGT: x           PT/INR - ( 12 Dec 2023 00:37 )   PT: 11.9 sec;   INR: 1.08 ratio         PTT - ( 12 Dec 2023 00:37 )  PTT:59.6 sec    Lactate, Blood: 1.6 mmol/L (23 @ 00:38)  Lactate, Blood: 1.1 mmol/L (23 @ 01:31)  Lactate, Blood: 1.9 mmol/L (12-10-23 @ 17:45)    Urinalysis Basic - ( 12 Dec 2023 00:36 )    Color: x / Appearance: x / SG: x / pH: x  Gluc: 129 mg/dL / Ketone: x  / Bili: x / Urobili: x   Blood: x / Protein: x / Nitrite: x   Leuk Esterase: x / RBC: x / WBC x   Sq Epi: x / Non Sq Epi: x / Bacteria: x      ======================Micro/Rad/Cardio=================  Telemtry: Reviewed   EKG: Reviewed  CXR: Reviewed  Culture: Reviewed   Echo:   Cath: Cardiac Cath Lab - Adult:   [facility]  Department of Cardiology  [address] [place] [address]  [phone]  Cath Lab Report -- Comprehensive Report  Patient: LD VALENCIA  Study date: 2017  Account number: [id number]  MR number: [id number]  : 1965  Gender: Male  Race: W  Case Physician(s):  Jairon Holguin M.D.  Referring Physician:  Luc Lynn M.D.  INDICATIONS: Unstable angina - CCS4.  HISTORY: The patient has a history of coronary artery disease. The patient  hashypertension and medication-treated dyslipidemia.  PROCEDURE:  --  Left heart catheterization with ventriculography.  --  Left coronary angiography.  --  Right coronary angiography.  TECHNIQUE: The risks and alternatives of the procedures and conscious  sedation were explained to the patient and informed consent was obtained.  Cardiac catheterization performed electively.  Local anesthetic given. Right radial artery access. A 6FR PRELUDE KIT was  inserted in the vessel. Left heart catheterization. Ventriculography was  performed. A 5FR FR4.0 EXPO catheter was utilized. Left coronary artery  angiography. The vessel was injected utilizing a 5FR FL3.5 EXPO catheter.  Right coronary artery angiography. The vessel was injected utilizing a 5FR  FR4.0 EXPO catheter. RADIATION EXPOSURE: 1.1 min.  CONTRAST GIVEN: Omnipaque 55 ml.  MEDICATIONS GIVEN: Midazolam, 1 mg, IV. Fentanyl, 25 mcg, IV. Verapamil  (Isoptin, Calan, Covera), 2.5 mg, IA. Heparin, 3000 units, IA.  VENTRICLES: Global left ventricular function was moderately depressed. EF  estimated was 40 %.  CORONARY VESSELS: The coronary circulation is right dominant.  LM:   --  LM: Normal.  LAD:   --  Proximal LAD: There was a 50 % stenosis.  CX:   --  Circumflex: Normal.  RCA:   --  Mid RCA: There was a 40 % stenosis.  --  Distal RCA: There was a 50 % stenosis.  COMPLICATIONS: There were no complications.  DIAGNOSTIC RECOMMENDATIONS: The patient should continue with the present  medications.  Prepared and signed by  Jairon Holguin M.D.  Signed 2017 12:20:13  HEMODYNAMIC TABLES  Pressures:  Baseline/ Room Air  Pressures:  - HR: 78  Pressures:  - Rhythm:  Pressures:  -- Aortic Pressure (S/D/M): --/--/99  Pressures:  -- Left Ventricle (s/edp): 157/39/--  Outputs:  Baseline/ Room Air  Outputs:  -- CALCULATIONS[age]  Outputs:  -- CALCULATIONS: Body Surface Area: 2.05  Outputs:  -- CALCULATIONS: Height in cm: 175.00  Outputs:  -- CALCULATIONS: Sex: Male  Outputs:  -- CALCULATIONS: Weight in k.40 (17 @ 21:55)    ======================================================  PAST MEDICAL & SURGICAL HISTORY:  HTN (hypertension)      CAD (coronary artery disease)  ; stent      Intracranial hemorrhage        Respiratory arrest        Myocardial infarction, unspecified MI type, unspecified artery      History of coronary artery stent placement

## 2023-12-12 NOTE — PROGRESS NOTE ADULT - ASSESSMENT
57 yo male h/o htn, cad s/p pci, ICH, here with NSTEMI  s/p intubation and cath. now in CCU    NSTEMI  s/p  cath  cath results noted. multi vessel dz., CTSx f/u.   hep gtt  pt with vtach/fib arrest again on 11/8. s/p ACLS and ROSC.   now extubated  IABP in place  mngt as per CCU  plan for transplant as per HF and transplant team  transplant w/u ongoing.   s/p colonoscopy 11/17. normal  now listed for transplant as of 11/22    LV thrombus   hep gtt    acute on chronic systolic heart failure  heart failure team f/u    pna  resolved     covid  resolved  now off isolation     h/o ICH  resolved    agitation  psy consult f/u    bacteremia  id following  iv abx  f/u repeat cult      vomiting and abd pain  ileus on CT  bowel regimen  gi f/u  +bm    now resolved.     IABP malposition on CT  mngt as per ccu. iabp adjusted    rash  possible drug reaction  derm f/u  improving    mngt as per CCU team          Advanced care planning was discussed with patient and family.  Advanced care planning forms were reviewed and discussed as appropriate.  Differential diagnosis and plan of care discussed with patient after the evaluation.   Pain assessed and judicious use of narcotics when appropriate was discussed.  Importance of Fall prevention discussed.  Counseling on Smoking and Alcohol cessation was offered when appropriate.  Counseling on Diet, exercise, and medication compliance was done.       Approx 75 minutes spent. 59 yo male h/o htn, cad s/p pci, ICH, here with NSTEMI  s/p intubation and cath. now in CCU    NSTEMI  s/p  cath  cath results noted. multi vessel dz., CTSx f/u.   hep gtt  pt with vtach/fib arrest again on 11/8. s/p ACLS and ROSC.   now extubated  IABP in place  mngt as per CCU  plan for transplant as per HF and transplant team  transplant w/u ongoing.   s/p colonoscopy 11/17. normal  now listed for transplant as of 11/22    LV thrombus   hep gtt    acute on chronic systolic heart failure  heart failure team f/u    pna  resolved     covid  resolved  now off isolation     h/o ICH  resolved    agitation  psy consult f/u    bacteremia  id following  iv abx  f/u repeat cult      vomiting and abd pain  ileus on CT  bowel regimen  gi f/u  +bm    now resolved.     IABP malposition on CT  mngt as per ccu. iabp adjusted    rash  possible drug reaction  derm f/u  improving    mngt as per CCU team          Advanced care planning was discussed with patient and family.  Advanced care planning forms were reviewed and discussed as appropriate.  Differential diagnosis and plan of care discussed with patient after the evaluation.   Pain assessed and judicious use of narcotics when appropriate was discussed.  Importance of Fall prevention discussed.  Counseling on Smoking and Alcohol cessation was offered when appropriate.  Counseling on Diet, exercise, and medication compliance was done.       Approx 75 minutes spent.

## 2023-12-12 NOTE — PROGRESS NOTE ADULT - ASSESSMENT
====================ASSESSMENT ==============  58 male with HTN, CAD (s/p PCI 2008), HFrEF, CVA 2018, and T2DM presenting with chest pressure and unknown tachycardia that was shocked x1, [facility] 11/1 found to have in-stent restenosis of pLAD and  of RCA with elevated RA and PA pressures and severely decreased. Admitted to CICU for management of cardiogenic shock and ADHF requiring IABP 11/1 -11/7, with hospital course c/b vfib arrest requiring reinsertion of IABP, currently undergoing OHT eval.    ====================== NEUROLOGY=====================  Anxiety  - No Seroquel/antipsychotics since vfib arrest and prolonged QTC  - Psych Eval, recommended SSRI, but pt. refused   - Psych recommending atarax PRN  - Continue to monitor mental status    PT/Conditioning  - Continue band exercises while on bedrest s/t IABP    ==================== RESPIRATORY======================  Acute Hypoxemic Respiratory Failure  - s/p x2 intubations for cardiogenic pulm edema and the in setting of cardiac arrest, resolved - extubated 11/10  - Currently maintaining >95% sats on room air  - Continue incentive spirometry and monitoring of sp02    Asthma  - c/w albuterol, symbicort and spiriva  - On trelegy at home  - Continue to monitor SpO2 with goal >94%    ====================CARDIOVASCULAR==================  Vfib arrest i/s/o ischemia  - [place]ido gtt off 11/13  - PO Amio load - total of 5g per EP complete 11/17  - Amio dc'd 12/5 for rising LFTs  - Keep K > 4, Mag > 2.2     Cardiogenic shock requiring IABP (11/1- 11/7, 11/9-)  - Likely 2/2 NSTEMI and ADHF  - 11/1 LHC: pLAD 100 % in-stent restenosis & mRCA, 100 %. PCWP 30. IABP placed.  - 11/1 TTE: LV dilated. EF 32 %. Regional WMAs present, mod (grade 2) LV diastolic dysfunction  - 11/2 TTE: EF 22% and + LV thrombus  - IABP swapped 11/20 to RFA, continue 1:1 support  - Off Milrinone gtt @ 7:30 am 11/11  - [place] d/c'd due to elevated K levels  - 12/5 - reduced hydralazine 100 TID to 75 TID and ISD 40 TID to 30 TID for AL reduction  - c/w coreg 25 BID for GDMT  - UNOS status 2 as of 12/6    NSTEMI iso stent re-occlusion of pLAD and 100%  of RCA  - EKG on admission w/ LBBB  - DAPT: c/w ASA, Brilinta d/c'd per transplant w/u  - c/w lipitor 80  - cMR deferred given necessity of IABP  - CT sx not recommending CABG, undergoing AT eval    LV thrombus  - c/w heparin gtt w/ therapeutic PTT    ===================== RENAL =========================  Non-oliguric ARIC, resolved   - Baseline Cr: 1-1.22  - Renal US: no evidence of renal artery stenosis  - Trend BMP, lytes daily, replace as needed  - Continue Strict I/Os, avoid nephrotoxins    Mild Hyponatremia/Hyperkalemia iso ARIC  - resolved  - Urea powder d/c'd per renal  - Trend daily  - Continue monitoring urine output, lytes, SCr/ BUN  - Replete lytes prn with goal K >4 and Mg >2    =============== GASTROINTESTINAL===================  Constipation/ileus, resolved  - regular diet  - bowel reg prn    ===================ENDO====================  Type 2 DM  - A1c 8.3, glucose controlled  - Continue lantus, premeal, low ISS  - continue FS    ===================HEMATOLOGIC/ONC ===================  - H/H & plts stable  - Monitor H/H and plts  - VTE PPX: heparin gtt    ==================INFECTIOUS DISEASE================  Positive blood culture, resolved  - Repeat BCx drawn 11/30 for leukocytosis to 12k - positive on 12/4, G+ rods in anaerobic bottle, suspect contaminant  - Repeat cultures x2 sent 12/4 per transplant ID recs - no growth to date  - Vancomycin by trough (12/4-12/6)  - Final microbial ID: Cutibacterium acnes, per ID this is likely to be a skin contaminant, vanc dc'd.   - Dental eval 12/7 to rule out infectious etiology that would have led to bacteremia, no significant findings on exam.     Enterococcus faecalis bacteremia, resolved  - BCx 11/17+ for enterococcus faecalis x2, Staph epi x1 (likely contaminant)- pan sensitive   - Urine cx 11/18 + enterococcus faecalis  - BCx 11/18 no growth   - IABP site swapped to RFA 11/20  - s/p Vancomycin 1g q12h (11/18-11/27)  - CT A/P negative for infectious pathology    COVID, resolved  - Off airborne precautions 11/11    Pre-transplant ID w/u   - Trend ID recs for serologies   - Colonoscopy 11/17 - normal   - Chest CT 11/17 - improved LLL aeration  - s/p immunizations    ==================DERMATOLOGY================  Upper Extremity Rash  - Patient developed bilateral upper extremity rash after receiving Hepatitis A & B immunizations on 11/24  - Dermatology consulted 12/5 - not likely to be related to vanco  - Recommend clobetasol 0.05% BID to affected areas   - RVP repeated to rule out viral etiology, negative      ======================= DISPOSITION  =====================  [X] Critical   [ ] Guarded    [ ] Stable    [X] Maintain in CICU  [ ] Downgrade to Telemtry  [ ] Discharge Home    Patient requires continuous monitoring with bedside rhythm monitoring, pulse ox monitoring, and intermittent blood gas analysis. Care plan discussed with ICU care team. Patient remained critical and at risk for life threatening decompensation.  Patient seen, examined and plan discussed with CCU team during rounds.     I have personally provided 30 minutes of critical care time excluding time spent on separate procedures, in addition to initial critical care time provided by the CICU Attending, Dr. Her    By signing my name below, I, Rosalba Tapia, attest that this documentation has been prepared under the direction and in the presence of Kitty Braden NP   Electronically signed: Rosalba Phillips, 12-12-23 @ 20:36    I, Kitty Braden , personally performed the services described in this documentation. all medical record entries made by the eileenibmartha were at my direction and in my presence. I have reviewed the chart and agree that the record reflects my personal performance and is accurate and complete  Electronically signed: Kitty Braden NP                  ====================ASSESSMENT ==============  58 male with HTN, CAD (s/p PCI 2008), HFrEF, CVA 2018, and T2DM presenting with chest pressure and unknown tachycardia that was shocked x1, [facility] 11/1 found to have in-stent restenosis of pLAD and  of RCA with elevated RA and PA pressures and severely decreased. Admitted to CICU for management of cardiogenic shock and ADHF requiring IABP 11/1 -11/7, with hospital course c/b vfib arrest requiring reinsertion of IABP, currently undergoing OHT eval.    ====================== NEUROLOGY=====================  Anxiety  - No Seroquel/antipsychotics since vfib arrest and prolonged QTC  - Psych Eval, recommended SSRI, but pt. refused   - Psych recommending atarax PRN  - Continue to monitor mental status    PT/Conditioning  - Continue band exercises while on bedrest s/t IABP    ==================== RESPIRATORY======================  Acute Hypoxemic Respiratory Failure  - s/p x2 intubations for cardiogenic pulm edema and the in setting of cardiac arrest, resolved - extubated 11/10  - Currently maintaining >95% sats on room air  - Continue incentive spirometry and monitoring of sp02    Asthma  - c/w albuterol, symbicort and spiriva  - On trelegy at home  - Continue to monitor SpO2 with goal >94%    ====================CARDIOVASCULAR==================  Vfib arrest i/s/o ischemia  - Lido gtt off 11/13  - PO Amio load - total of 5g per EP complete 11/17  - Amio dc'd 12/5 for rising LFTs  - Keep K > 4, Mag > 2.2     Cardiogenic shock requiring IABP (11/1- 11/7, 11/9-)  - Likely 2/2 NSTEMI and ADHF  - 11/1 LHC: pLAD 100 % in-stent restenosis & mRCA, 100 %. PCWP 30. IABP placed.  - 11/1 TTE: LV dilated. EF 32 %. Regional WMAs present, mod (grade 2) LV diastolic dysfunction  - 11/2 TTE: EF 22% and + LV thrombus  - IABP swapped 11/20 to RFA, continue 1:1 support  - Off Milrinone gtt @ 7:30 am 11/11  - [place] d/c'd due to elevated K levels  - 12/5 - reduced hydralazine 100 TID to 75 TID and ISD 40 TID to 30 TID for AL reduction  - c/w coreg 25 BID for GDMT  - UNOS status 2 as of 12/6    NSTEMI iso stent re-occlusion of pLAD and 100%  of RCA  - EKG on admission w/ LBBB  - DAPT: c/w ASA, Brilinta d/c'd per transplant w/u  - c/w lipitor 80  - cMR deferred given necessity of IABP  - CT sx not recommending CABG, undergoing AT eval    LV thrombus  - c/w heparin gtt w/ therapeutic PTT    ===================== RENAL =========================  Non-oliguric ARIC, resolved   - Baseline Cr: 1-1.22  - Renal US: no evidence of renal artery stenosis  - Trend BMP, lytes daily, replace as needed  - Continue Strict I/Os, avoid nephrotoxins    Mild Hyponatremia/Hyperkalemia iso ARIC  - resolved  - Urea powder d/c'd per renal  - Trend daily  - Continue monitoring urine output, lytes, SCr/ BUN  - Replete lytes prn with goal K >4 and Mg >2    =============== GASTROINTESTINAL===================  Constipation/ileus, resolved  - regular diet  - bowel reg prn    ===================ENDO====================  Type 2 DM  - A1c 8.3, glucose controlled  - Continue lantus, premeal, low ISS  - continue FS    ===================HEMATOLOGIC/ONC ===================  - H/H & plts stable  - Monitor H/H and plts  - VTE PPX: heparin gtt    ==================INFECTIOUS DISEASE================  Positive blood culture, resolved  - Repeat BCx drawn 11/30 for leukocytosis to 12k - positive on 12/4, G+ rods in anaerobic bottle, suspect contaminant  - Repeat cultures x2 sent 12/4 per transplant ID recs - no growth to date  - Vancomycin by trough (12/4-12/6)  - Final microbial ID: Cutibacterium acnes, per ID this is likely to be a skin contaminant, vanc dc'd.   - Dental eval 12/7 to rule out infectious etiology that would have led to bacteremia, no significant findings on exam.     Enterococcus faecalis bacteremia, resolved  - BCx 11/17+ for enterococcus faecalis x2, Staph epi x1 (likely contaminant)- pan sensitive   - Urine cx 11/18 + enterococcus faecalis  - BCx 11/18 no growth   - IABP site swapped to RFA 11/20  - s/p Vancomycin 1g q12h (11/18-11/27)  - CT A/P negative for infectious pathology    COVID, resolved  - Off airborne precautions 11/11    Pre-transplant ID w/u   - Trend ID recs for serologies   - Colonoscopy 11/17 - normal   - Chest CT 11/17 - improved LLL aeration  - s/p immunizations    ==================DERMATOLOGY================  Upper Extremity Rash  - Patient developed bilateral upper extremity rash after receiving Hepatitis A & B immunizations on 11/24  - Dermatology consulted 12/5 - not likely to be related to vanco  - Recommend clobetasol 0.05% BID to affected areas   - RVP repeated to rule out viral etiology, negative      ======================= DISPOSITION  =====================  [X] Critical   [ ] Guarded    [ ] Stable    [X] Maintain in CICU  [ ] Downgrade to Telemtry  [ ] Discharge Home    Patient requires continuous monitoring with bedside rhythm monitoring, pulse ox monitoring, and intermittent blood gas analysis. Care plan discussed with ICU care team. Patient remained critical and at risk for life threatening decompensation.  Patient seen, examined and plan discussed with CCU team during rounds.     I have personally provided 30 minutes of critical care time excluding time spent on separate procedures, in addition to initial critical care time provided by the CICU Attending, Dr. Her    By signing my name below, I, Rosalba Tapia, attest that this documentation has been prepared under the direction and in the presence of Kitty Braden NP   Electronically signed: Rosalba Phillips, 12-12-23 @ 20:36    I, Kitty Braden , personally performed the services described in this documentation. all medical record entries made by the eileenibmartha were at my direction and in my presence. I have reviewed the chart and agree that the record reflects my personal performance and is accurate and complete  Electronically signed: Kitty Braden NP

## 2023-12-12 NOTE — PROGRESS NOTE ADULT - SUBJECTIVE AND OBJECTIVE BOX
Follow Up:  pre-OHT    Interval History: afebrile. no acute events. rash improving.     REVIEW OF SYSTEMS  [  ] ROS unobtainable because:    [ x ] All other systems negative except as noted below    Constitutional:  [ ] fever [ ] chills  [ ] weight loss  [ ] weakness  Skin:  [ ] rash [ ] phlebitis	  Eyes: [ ] icterus [ ] pain  [ ] discharge	  ENMT: [ ] sore throat  [ ] thrush [ ] ulcers [ ] exudates  Respiratory: [ ] dyspnea [ ] hemoptysis [ ] cough [ ] sputum	  Cardiovascular:  [ ] chest pain [ ] palpitations [ ] edema	  Gastrointestinal:  [ ] nausea [ ] vomiting [ ] diarrhea [ ] constipation [ ] pain	  Genitourinary:  [ ] dysuria [ ] frequency [ ] hematuria [ ] discharge [ ] flank pain  [ ] incontinence  Musculoskeletal:  [ ] myalgias [ ] arthralgias [ ] arthritis  [ ] back pain  Neurological:  [ ] headache [ ] seizures  [ ] confusion/altered mental status    Allergies  penicillins (Unknown)        ANTIMICROBIALS:      OTHER MEDS:  MEDICATIONS  (STANDING):  aspirin  chewable 81 daily  atorvastatin 80 at bedtime  budesonide  80 MICROgram(s)/formoterol 4.5 MICROgram(s) Inhaler 2 two times a day  carvedilol 25 every 12 hours  heparin  Infusion 1300 <Continuous>  hydrALAZINE 75 three times a day  hydrOXYzine hydrochloride 25 two times a day PRN  insulin glargine Injectable (LANTUS) 12 at bedtime  insulin lispro (ADMELOG) corrective regimen sliding scale  three times a day before meals  insulin lispro (ADMELOG) corrective regimen sliding scale  at bedtime  insulin lispro Injectable (ADMELOG) 9 three times a day before meals  isosorbide   dinitrate Tablet (ISORDIL) 30 three times a day  polyethylene glycol 3350 17 two times a day  senna 2 at bedtime      Vital Signs Last 24 Hrs  T(C): 36.8 (12 Dec 2023 17:00), Max: 37.1 (12 Dec 2023 03:00)  T(F): 98.2 (12 Dec 2023 17:00), Max: 98.7 (12 Dec 2023 03:00)  HR: 83 (12 Dec 2023 17:00) (78 - 104)  BP: --  BP(mean): --  RR: 22 (12 Dec 2023 17:00) (16 - 27)  SpO2: 96% (12 Dec 2023 17:00) (96% - 100%)    Parameters below as of 12 Dec 2023 17:00  Patient On (Oxygen Delivery Method): room air      PHYSICAL EXAMINATION:  General: Alert and Awake, NAD  Cardiac: RRR, No M/R/G  Resp: CTAB, No Wh/Rh/Ra  Abdomen: NBS, NT/ND, No HSM, No rigidity or guarding  MSK: No LE edema. No Calf tenderness  Vasc: R Balloon Pump (No surrounding erythema, drainage or tenderness to palpation)  Skin: Maculopapular rash on UE, UE and to lesser degree the chest. Skin is warm and dry to the touch.   Neuro: Alert and Awake. CN 2-12 Grossly intact. Moves all four extremities spontaneously.  Psych: Calm, Pleasant, Cooperative                          12.2   9.66  )-----------( 147      ( 12 Dec 2023 00:38 )             37.4       12-12    138  |  105  |  29<H>  ----------------------------<  129<H>  4.7   |  22  |  1.15    Ca    10.1      12 Dec 2023 00:36  Phos  3.7     12-12  Mg     1.8     12-12    TPro  7.2  /  Alb  4.0  /  TBili  0.3  /  DBili  x   /  AST  44<H>  /  ALT  147<H>  /  AlkPhos  62  12-12      Urinalysis Basic - ( 12 Dec 2023 00:36 )    Color: x / Appearance: x / SG: x / pH: x  Gluc: 129 mg/dL / Ketone: x  / Bili: x / Urobili: x   Blood: x / Protein: x / Nitrite: x   Leuk Esterase: x / RBC: x / WBC x   Sq Epi: x / Non Sq Epi: x / Bacteria: x        MICROBIOLOGY:  v  .Blood Blood-Peripheral  12-04-23   No growth at 5 days  --  --      .Blood Blood-Peripheral  12-04-23   No growth at 5 days  --  --      .Blood Blood  11-30-23   Growth in anaerobic bottle: Cutibacterium acnes  Direct identification is available within approximately 3-5  hours either by Blood Panel Multiplexed PCR or Direct  MALDI-TOF. Details: [email or web address]  --  Cutibacterium acnes  Blood Culture PCR      .Blood Blood-Venous  11-29-23   No growth at 5 days  --  --      .Blood Blood-Peripheral  11-18-23   No growth at 5 days  --  --      .Blood Blood-Peripheral  11-18-23   No growth at 5 days  --  --      Clean Catch Clean Catch (Midstream)  11-18-23   10,000 - 49,000 CFU/mL Enterococcus faecalis  <10,000 CFU/ml Normal Urogenital kaitlynn present  --  Enterococcus faecalis      .Blood Blood  11-17-23   Growth in aerobic and anaerobic bottles: Enterococcus faecalis  See previous culture [id number]  Growth in aerobic bottle: Staphylococcus epidermidis  --  Staphylococcus epidermidis      .Blood Blood  11-17-23   Growth in aerobic bottle: Enterococcus faecalis  Growth in anaerobic bottle: Staphylococcus epidermidis "Susceptibilities  not performed"  Direct identification is available within approximately 3-5  hours either by Blood Panel Multiplexed PCR or Direct  MALDI-TOF. Details: [email or web address]  --  Blood Culture PCR  Blood Culture PCR  Enterococcus faecalis        CMV IgG Antibody: 4.20 U/mL (11-10-23 @ 17:29)  Toxoplasma IgG Screen: <3.0 IU/mL (11-10-23 @ 17:29)  HIV-1 RNA Quantitative, Viral Load Log: NOT DET. lg /mL (11-10-23 @ 17:06)    Rapid RVP Result: NotDetec (12-05 @ 20:42)        RADIOLOGY:    <The imaging below has been reviewed and visualized by me independently. Findings as detailed in report below>    < from: Xray Chest 1 View- PORTABLE-Routine (Xray Chest 1 View- PORTABLE-Routine in AM.) (12.11.23 @ 06:40) >  IMPRESSION:  No significant interval change, intra-aortic balloon pump is 5 cm below   the top the aortic arch.    < end of copied text >   Follow Up:  pre-OHT    Interval History: afebrile. no acute events. rash improving.     REVIEW OF SYSTEMS  [  ] ROS unobtainable because:    [ x ] All other systems negative except as noted below    Constitutional:  [ ] fever [ ] chills  [ ] weight loss  [ ] weakness  Skin:  [ ] rash [ ] phlebitis	  Eyes: [ ] icterus [ ] pain  [ ] discharge	  ENMT: [ ] sore throat  [ ] thrush [ ] ulcers [ ] exudates  Respiratory: [ ] dyspnea [ ] hemoptysis [ ] cough [ ] sputum	  Cardiovascular:  [ ] chest pain [ ] palpitations [ ] edema	  Gastrointestinal:  [ ] nausea [ ] vomiting [ ] diarrhea [ ] constipation [ ] pain	  Genitourinary:  [ ] dysuria [ ] frequency [ ] hematuria [ ] discharge [ ] flank pain  [ ] incontinence  Musculoskeletal:  [ ] myalgias [ ] arthralgias [ ] arthritis  [ ] back pain  Neurological:  [ ] headache [ ] seizures  [ ] confusion/altered mental status    Allergies  penicillins (Unknown)        ANTIMICROBIALS:      OTHER MEDS:  MEDICATIONS  (STANDING):  aspirin  chewable 81 daily  atorvastatin 80 at bedtime  budesonide  80 MICROgram(s)/formoterol 4.5 MICROgram(s) Inhaler 2 two times a day  carvedilol 25 every 12 hours  heparin  Infusion 1300 <Continuous>  hydrALAZINE 75 three times a day  hydrOXYzine hydrochloride 25 two times a day PRN  insulin glargine Injectable (LANTUS) 12 at bedtime  insulin lispro (ADMELOG) corrective regimen sliding scale  three times a day before meals  insulin lispro (ADMELOG) corrective regimen sliding scale  at bedtime  insulin lispro Injectable (ADMELOG) 9 three times a day before meals  isosorbide   dinitrate Tablet (ISORDIL) 30 three times a day  polyethylene glycol 3350 17 two times a day  senna 2 at bedtime      Vital Signs Last 24 Hrs  T(C): 36.8 (12 Dec 2023 17:00), Max: 37.1 (12 Dec 2023 03:00)  T(F): 98.2 (12 Dec 2023 17:00), Max: 98.7 (12 Dec 2023 03:00)  HR: 83 (12 Dec 2023 17:00) (78 - 104)  BP: --  BP(mean): --  RR: 22 (12 Dec 2023 17:00) (16 - 27)  SpO2: 96% (12 Dec 2023 17:00) (96% - 100%)    Parameters below as of 12 Dec 2023 17:00  Patient On (Oxygen Delivery Method): room air      PHYSICAL EXAMINATION:  General: Alert and Awake, NAD  Cardiac: RRR, No M/R/G  Resp: CTAB, No Wh/Rh/Ra  Abdomen: NBS, NT/ND, No HSM, No rigidity or guarding  MSK: No LE edema. No Calf tenderness  Vasc: R Balloon Pump (No surrounding erythema, drainage or tenderness to palpation)  Skin: Maculopapular rash on UE, UE and to lesser degree the chest. Skin is warm and dry to the touch.   Neuro: Alert and Awake. CN 2-12 Grossly intact. Moves all four extremities spontaneously.  Psych: Calm, Pleasant, Cooperative                          12.2   9.66  )-----------( 147      ( 12 Dec 2023 00:38 )             37.4       12-12    138  |  105  |  29<H>  ----------------------------<  129<H>  4.7   |  22  |  1.15    Ca    10.1      12 Dec 2023 00:36  Phos  3.7     12-12  Mg     1.8     12-12    TPro  7.2  /  Alb  4.0  /  TBili  0.3  /  DBili  x   /  AST  44<H>  /  ALT  147<H>  /  AlkPhos  62  12-12      Urinalysis Basic - ( 12 Dec 2023 00:36 )    Color: x / Appearance: x / SG: x / pH: x  Gluc: 129 mg/dL / Ketone: x  / Bili: x / Urobili: x   Blood: x / Protein: x / Nitrite: x   Leuk Esterase: x / RBC: x / WBC x   Sq Epi: x / Non Sq Epi: x / Bacteria: x        MICROBIOLOGY:  v  .Blood Blood-Peripheral  12-04-23   No growth at 5 days  --  --      .Blood Blood-Peripheral  12-04-23   No growth at 5 days  --  --      .Blood Blood  11-30-23   Growth in anaerobic bottle: Cutibacterium acnes  Direct identification is available within approximately 3-5  hours either by Blood Panel Multiplexed PCR or Direct  MALDI-TOF. Details: [email or web address]  --  Cutibacterium acnes  Blood Culture PCR      .Blood Blood-Venous  11-29-23   No growth at 5 days  --  --      .Blood Blood-Peripheral  11-18-23   No growth at 5 days  --  --      .Blood Blood-Peripheral  11-18-23   No growth at 5 days  --  --      Clean Catch Clean Catch (Midstream)  11-18-23   10,000 - 49,000 CFU/mL Enterococcus faecalis  <10,000 CFU/ml Normal Urogenital kaitlynn present  --  Enterococcus faecalis      .Blood Blood  11-17-23   Growth in aerobic and anaerobic bottles: Enterococcus faecalis  See previous culture [id number]  Growth in aerobic bottle: Staphylococcus epidermidis  --  Staphylococcus epidermidis      .Blood Blood  11-17-23   Growth in aerobic bottle: Enterococcus faecalis  Growth in anaerobic bottle: Staphylococcus epidermidis "Susceptibilities  not performed"  Direct identification is available within approximately 3-5  hours either by Blood Panel Multiplexed PCR or Direct  MALDI-TOF. Details: [email or web address]  --  Blood Culture PCR  Blood Culture PCR  Enterococcus faecalis        CMV IgG Antibody: 4.20 U/mL (11-10-23 @ 17:29)  Toxoplasma IgG Screen: <3.0 IU/mL (11-10-23 @ 17:29)  HIV-1 RNA Quantitative, Viral Load Log: NOT DET. lg /mL (11-10-23 @ 17:06)    Rapid RVP Result: NotDetec (12-05 @ 20:42)        RADIOLOGY:    <The imaging below has been reviewed and visualized by me independently. Findings as detailed in report below>    < from: Xray Chest 1 View- PORTABLE-Routine (Xray Chest 1 View- PORTABLE-Routine in AM.) (12.11.23 @ 06:40) >  IMPRESSION:  No significant interval change, intra-aortic balloon pump is 5 cm below   the top the aortic arch.    < end of copied text >

## 2023-12-12 NOTE — PROGRESS NOTE ADULT - ATTENDING COMMENTS
59yo M with ischemic HFrEF, ADHF and cardiogenic shock awaiting heart transplant (status 2)  Neuro: no deficits, prn atarax for anxiety  Pulm: sating well on RA, prn albuterol and symbicort  CV: IABP 1:1, coreg, hydral, isordil  Renal: Cr under 1, no issues  GI: DASH diet  Heme: heparin gtt for LV thrombus and IABP  ID: no active issues  Endo: BG controlled  Derm: Hepatitis immunization induced rash, improving on topical steroids  Lines: IABP (11/20) 57yo M with ischemic HFrEF, ADHF and cardiogenic shock awaiting heart transplant (status 2)  Neuro: no deficits, prn atarax for anxiety  Pulm: sating well on RA, prn albuterol and symbicort  CV: IABP 1:1, coreg, hydral, isordil  Renal: Cr under 1, no issues  GI: DASH diet  Heme: heparin gtt for LV thrombus and IABP  ID: no active issues  Endo: BG controlled  Derm: Hepatitis immunization induced rash, improving on topical steroids  Lines: IABP (11/20)

## 2023-12-13 LAB
ALBUMIN SERPL ELPH-MCNC: 3.9 G/DL — SIGNIFICANT CHANGE UP (ref 3.3–5)
ALBUMIN SERPL ELPH-MCNC: 3.9 G/DL — SIGNIFICANT CHANGE UP (ref 3.3–5)
ALP SERPL-CCNC: 68 U/L — SIGNIFICANT CHANGE UP (ref 40–120)
ALP SERPL-CCNC: 68 U/L — SIGNIFICANT CHANGE UP (ref 40–120)
ALT FLD-CCNC: 162 U/L — HIGH (ref 10–45)
ALT FLD-CCNC: 162 U/L — HIGH (ref 10–45)
ANION GAP SERPL CALC-SCNC: 10 MMOL/L — SIGNIFICANT CHANGE UP (ref 5–17)
ANION GAP SERPL CALC-SCNC: 10 MMOL/L — SIGNIFICANT CHANGE UP (ref 5–17)
APTT BLD: 67 SEC — HIGH (ref 24.5–35.6)
APTT BLD: 67 SEC — HIGH (ref 24.5–35.6)
AST SERPL-CCNC: 55 U/L — HIGH (ref 10–40)
AST SERPL-CCNC: 55 U/L — HIGH (ref 10–40)
BASOPHILS # BLD AUTO: 0.03 K/UL — SIGNIFICANT CHANGE UP (ref 0–0.2)
BASOPHILS # BLD AUTO: 0.03 K/UL — SIGNIFICANT CHANGE UP (ref 0–0.2)
BASOPHILS NFR BLD AUTO: 0.4 % — SIGNIFICANT CHANGE UP (ref 0–2)
BASOPHILS NFR BLD AUTO: 0.4 % — SIGNIFICANT CHANGE UP (ref 0–2)
BILIRUB SERPL-MCNC: 0.3 MG/DL — SIGNIFICANT CHANGE UP (ref 0.2–1.2)
BILIRUB SERPL-MCNC: 0.3 MG/DL — SIGNIFICANT CHANGE UP (ref 0.2–1.2)
BLD GP AB SCN SERPL QL: NEGATIVE — SIGNIFICANT CHANGE UP
BLD GP AB SCN SERPL QL: NEGATIVE — SIGNIFICANT CHANGE UP
BUN SERPL-MCNC: 34 MG/DL — HIGH (ref 7–23)
BUN SERPL-MCNC: 34 MG/DL — HIGH (ref 7–23)
CALCIUM SERPL-MCNC: 10 MG/DL — SIGNIFICANT CHANGE UP (ref 8.4–10.5)
CALCIUM SERPL-MCNC: 10 MG/DL — SIGNIFICANT CHANGE UP (ref 8.4–10.5)
CHLORIDE SERPL-SCNC: 106 MMOL/L — SIGNIFICANT CHANGE UP (ref 96–108)
CHLORIDE SERPL-SCNC: 106 MMOL/L — SIGNIFICANT CHANGE UP (ref 96–108)
CO2 SERPL-SCNC: 19 MMOL/L — LOW (ref 22–31)
CO2 SERPL-SCNC: 19 MMOL/L — LOW (ref 22–31)
CREAT SERPL-MCNC: 0.99 MG/DL — SIGNIFICANT CHANGE UP (ref 0.5–1.3)
CREAT SERPL-MCNC: 0.99 MG/DL — SIGNIFICANT CHANGE UP (ref 0.5–1.3)
EGFR: 88 ML/MIN/1.73M2 — SIGNIFICANT CHANGE UP
EGFR: 88 ML/MIN/1.73M2 — SIGNIFICANT CHANGE UP
EOSINOPHIL # BLD AUTO: 0.84 K/UL — HIGH (ref 0–0.5)
EOSINOPHIL # BLD AUTO: 0.84 K/UL — HIGH (ref 0–0.5)
EOSINOPHIL NFR BLD AUTO: 10.6 % — HIGH (ref 0–6)
EOSINOPHIL NFR BLD AUTO: 10.6 % — HIGH (ref 0–6)
GLUCOSE BLDC GLUCOMTR-MCNC: 130 MG/DL — HIGH (ref 70–99)
GLUCOSE BLDC GLUCOMTR-MCNC: 130 MG/DL — HIGH (ref 70–99)
GLUCOSE BLDC GLUCOMTR-MCNC: 144 MG/DL — HIGH (ref 70–99)
GLUCOSE BLDC GLUCOMTR-MCNC: 166 MG/DL — HIGH (ref 70–99)
GLUCOSE BLDC GLUCOMTR-MCNC: 166 MG/DL — HIGH (ref 70–99)
GLUCOSE BLDC GLUCOMTR-MCNC: 174 MG/DL — HIGH (ref 70–99)
GLUCOSE BLDC GLUCOMTR-MCNC: 174 MG/DL — HIGH (ref 70–99)
GLUCOSE SERPL-MCNC: 207 MG/DL — HIGH (ref 70–99)
GLUCOSE SERPL-MCNC: 207 MG/DL — HIGH (ref 70–99)
HCT VFR BLD CALC: 36.8 % — LOW (ref 39–50)
HCT VFR BLD CALC: 36.8 % — LOW (ref 39–50)
HGB BLD-MCNC: 12.1 G/DL — LOW (ref 13–17)
HGB BLD-MCNC: 12.1 G/DL — LOW (ref 13–17)
IMM GRANULOCYTES NFR BLD AUTO: 0.5 % — SIGNIFICANT CHANGE UP (ref 0–0.9)
IMM GRANULOCYTES NFR BLD AUTO: 0.5 % — SIGNIFICANT CHANGE UP (ref 0–0.9)
INR BLD: 1.04 RATIO — SIGNIFICANT CHANGE UP (ref 0.85–1.18)
INR BLD: 1.04 RATIO — SIGNIFICANT CHANGE UP (ref 0.85–1.18)
LACTATE SERPL-SCNC: 1.3 MMOL/L — SIGNIFICANT CHANGE UP (ref 0.5–2)
LACTATE SERPL-SCNC: 1.3 MMOL/L — SIGNIFICANT CHANGE UP (ref 0.5–2)
LYMPHOCYTES # BLD AUTO: 1.48 K/UL — SIGNIFICANT CHANGE UP (ref 1–3.3)
LYMPHOCYTES # BLD AUTO: 1.48 K/UL — SIGNIFICANT CHANGE UP (ref 1–3.3)
LYMPHOCYTES # BLD AUTO: 18.6 % — SIGNIFICANT CHANGE UP (ref 13–44)
LYMPHOCYTES # BLD AUTO: 18.6 % — SIGNIFICANT CHANGE UP (ref 13–44)
MAGNESIUM SERPL-MCNC: 1.8 MG/DL — SIGNIFICANT CHANGE UP (ref 1.6–2.6)
MAGNESIUM SERPL-MCNC: 1.8 MG/DL — SIGNIFICANT CHANGE UP (ref 1.6–2.6)
MCHC RBC-ENTMCNC: 30 PG — SIGNIFICANT CHANGE UP (ref 27–34)
MCHC RBC-ENTMCNC: 30 PG — SIGNIFICANT CHANGE UP (ref 27–34)
MCHC RBC-ENTMCNC: 32.9 GM/DL — SIGNIFICANT CHANGE UP (ref 32–36)
MCHC RBC-ENTMCNC: 32.9 GM/DL — SIGNIFICANT CHANGE UP (ref 32–36)
MCV RBC AUTO: 91.1 FL — SIGNIFICANT CHANGE UP (ref 80–100)
MCV RBC AUTO: 91.1 FL — SIGNIFICANT CHANGE UP (ref 80–100)
MONOCYTES # BLD AUTO: 0.48 K/UL — SIGNIFICANT CHANGE UP (ref 0–0.9)
MONOCYTES # BLD AUTO: 0.48 K/UL — SIGNIFICANT CHANGE UP (ref 0–0.9)
MONOCYTES NFR BLD AUTO: 6 % — SIGNIFICANT CHANGE UP (ref 2–14)
MONOCYTES NFR BLD AUTO: 6 % — SIGNIFICANT CHANGE UP (ref 2–14)
NEUTROPHILS # BLD AUTO: 5.07 K/UL — SIGNIFICANT CHANGE UP (ref 1.8–7.4)
NEUTROPHILS # BLD AUTO: 5.07 K/UL — SIGNIFICANT CHANGE UP (ref 1.8–7.4)
NEUTROPHILS NFR BLD AUTO: 63.9 % — SIGNIFICANT CHANGE UP (ref 43–77)
NEUTROPHILS NFR BLD AUTO: 63.9 % — SIGNIFICANT CHANGE UP (ref 43–77)
NRBC # BLD: 0 /100 WBCS — SIGNIFICANT CHANGE UP (ref 0–0)
NRBC # BLD: 0 /100 WBCS — SIGNIFICANT CHANGE UP (ref 0–0)
PHOSPHATE SERPL-MCNC: 3.8 MG/DL — SIGNIFICANT CHANGE UP (ref 2.5–4.5)
PHOSPHATE SERPL-MCNC: 3.8 MG/DL — SIGNIFICANT CHANGE UP (ref 2.5–4.5)
PLATELET # BLD AUTO: 152 K/UL — SIGNIFICANT CHANGE UP (ref 150–400)
PLATELET # BLD AUTO: 152 K/UL — SIGNIFICANT CHANGE UP (ref 150–400)
POTASSIUM SERPL-MCNC: 4.3 MMOL/L — SIGNIFICANT CHANGE UP (ref 3.5–5.3)
POTASSIUM SERPL-MCNC: 4.3 MMOL/L — SIGNIFICANT CHANGE UP (ref 3.5–5.3)
POTASSIUM SERPL-SCNC: 4.3 MMOL/L — SIGNIFICANT CHANGE UP (ref 3.5–5.3)
POTASSIUM SERPL-SCNC: 4.3 MMOL/L — SIGNIFICANT CHANGE UP (ref 3.5–5.3)
PROT SERPL-MCNC: 6.9 G/DL — SIGNIFICANT CHANGE UP (ref 6–8.3)
PROT SERPL-MCNC: 6.9 G/DL — SIGNIFICANT CHANGE UP (ref 6–8.3)
PROTHROM AB SERPL-ACNC: 10.9 SEC — SIGNIFICANT CHANGE UP (ref 9.5–13)
PROTHROM AB SERPL-ACNC: 10.9 SEC — SIGNIFICANT CHANGE UP (ref 9.5–13)
RBC # BLD: 4.04 M/UL — LOW (ref 4.2–5.8)
RBC # BLD: 4.04 M/UL — LOW (ref 4.2–5.8)
RBC # FLD: 15.8 % — HIGH (ref 10.3–14.5)
RBC # FLD: 15.8 % — HIGH (ref 10.3–14.5)
RH IG SCN BLD-IMP: POSITIVE — SIGNIFICANT CHANGE UP
RH IG SCN BLD-IMP: POSITIVE — SIGNIFICANT CHANGE UP
SODIUM SERPL-SCNC: 135 MMOL/L — SIGNIFICANT CHANGE UP (ref 135–145)
SODIUM SERPL-SCNC: 135 MMOL/L — SIGNIFICANT CHANGE UP (ref 135–145)
WBC # BLD: 7.94 K/UL — SIGNIFICANT CHANGE UP (ref 3.8–10.5)
WBC # BLD: 7.94 K/UL — SIGNIFICANT CHANGE UP (ref 3.8–10.5)
WBC # FLD AUTO: 7.94 K/UL — SIGNIFICANT CHANGE UP (ref 3.8–10.5)
WBC # FLD AUTO: 7.94 K/UL — SIGNIFICANT CHANGE UP (ref 3.8–10.5)

## 2023-12-13 PROCEDURE — 99292 CRITICAL CARE ADDL 30 MIN: CPT | Mod: 25

## 2023-12-13 PROCEDURE — 99291 CRITICAL CARE FIRST HOUR: CPT | Mod: 25

## 2023-12-13 PROCEDURE — 36620 INSERTION CATHETER ARTERY: CPT

## 2023-12-13 PROCEDURE — 71045 X-RAY EXAM CHEST 1 VIEW: CPT | Mod: 26,76

## 2023-12-13 RX ORDER — MAGNESIUM SULFATE 500 MG/ML
2 VIAL (ML) INJECTION ONCE
Refills: 0 | Status: COMPLETED | OUTPATIENT
Start: 2023-12-13 | End: 2023-12-13

## 2023-12-13 RX ADMIN — Medication 75 MILLIGRAM(S): at 05:54

## 2023-12-13 RX ADMIN — Medication 1: at 12:47

## 2023-12-13 RX ADMIN — ISOSORBIDE DINITRATE 30 MILLIGRAM(S): 5 TABLET ORAL at 05:55

## 2023-12-13 RX ADMIN — Medication 9 UNIT(S): at 08:04

## 2023-12-13 RX ADMIN — ATORVASTATIN CALCIUM 80 MILLIGRAM(S): 80 TABLET, FILM COATED ORAL at 22:06

## 2023-12-13 RX ADMIN — Medication 75 MILLIGRAM(S): at 13:15

## 2023-12-13 RX ADMIN — CARVEDILOL PHOSPHATE 25 MILLIGRAM(S): 80 CAPSULE, EXTENDED RELEASE ORAL at 18:14

## 2023-12-13 RX ADMIN — Medication 81 MILLIGRAM(S): at 11:24

## 2023-12-13 RX ADMIN — ISOSORBIDE DINITRATE 30 MILLIGRAM(S): 5 TABLET ORAL at 11:25

## 2023-12-13 RX ADMIN — Medication 9 UNIT(S): at 12:47

## 2023-12-13 RX ADMIN — CARVEDILOL PHOSPHATE 25 MILLIGRAM(S): 80 CAPSULE, EXTENDED RELEASE ORAL at 05:55

## 2023-12-13 RX ADMIN — Medication 9 UNIT(S): at 16:55

## 2023-12-13 RX ADMIN — Medication 25 GRAM(S): at 04:07

## 2023-12-13 RX ADMIN — ISOSORBIDE DINITRATE 30 MILLIGRAM(S): 5 TABLET ORAL at 16:36

## 2023-12-13 RX ADMIN — INSULIN GLARGINE 12 UNIT(S): 100 INJECTION, SOLUTION SUBCUTANEOUS at 22:06

## 2023-12-13 RX ADMIN — HEPARIN SODIUM 14 UNIT(S)/HR: 5000 INJECTION INTRAVENOUS; SUBCUTANEOUS at 11:51

## 2023-12-13 RX ADMIN — BUDESONIDE AND FORMOTEROL FUMARATE DIHYDRATE 2 PUFF(S): 160; 4.5 AEROSOL RESPIRATORY (INHALATION) at 09:26

## 2023-12-13 NOTE — PROCEDURE NOTE - NSSITEPREP_SKIN_A_CORE
chlorhexidine
Adherence to aseptic technique: hand hygiene prior to donning barriers (gown, gloves), don cap and mask, sterile drape over patient
chlorhexidine

## 2023-12-13 NOTE — PROGRESS NOTE ADULT - ASSESSMENT
====================ASSESSMENT ==============  58 male with HTN, CAD (s/p PCI 2008), HFrEF, CVA 2018, and T2DM presenting with chest pressure and unknown tachycardia that was shocked x1, [facility] 11/1 found to have in-stent restenosis of pLAD and  of RCA with elevated RA and PA pressures and severely decreased. Admitted to CICU for management of cardiogenic shock and ADHF requiring IABP 11/1 -11/7, with hospital course c/b vfib arrest requiring reinsertion of IABP. Not recommended for ATs per HF. Currently listed for transplant status 2.    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 AT evaluation launched 11/10, he is ABO A. He was discussed during TSC on 11/16 and was approved for listing, however was found to be Bacteremic with 11/17 Blc Cx growing mixed cultures Staph epi and Enterococcus faecalis and started on IV Vanco. Repeat cultures from 11/18 reveal NGTD and therefore was cleared by ID for listing.     He appears adequately supported on IABP at 1:1, electrically quiescent on oral Amiodarone. Cr is improving with holding diuretics. Labs otherwise notable for worsening thrombocytopenia. Awaiting suitable donor. Now with GM + rods in blood culture on 11/30 (reported on 12/4), restarted on vancomycin, and status 7, repeat cultures now negative x48 hours (12/6), now status 2 again.       ====================== NEUROLOGY=====================  Anxiety  - No Seroquel/antipsychotics since vfib arrest and prolonged QTC  - Psych Eval, recommended SSRI, but pt. refused   - Psych recommending atarax PRN  - Continue to monitor mental status    PT/Conditioning  - Continue band exercises while on bedrest s/t IABP    ==================== RESPIRATORY======================  Acute Hypoxemic Respiratory Failure  - s/p x2 intubations for cardiogenic pulm edema and the in setting of cardiac arrest, resolved - extubated 11/10  - Currently maintaining >95% sats on room air  - Continue incentive spirometry and monitoring of sp02    Asthma  - c/w albuterol, symbicort and spiriva  - On trelegy at home  - Continue to monitor SpO2 with goal >94%    ====================CARDIOVASCULAR==================  Vfib arrest i/s/o ischemia  - Lido gtt off 11/13  - PO Amio load - total of 5g per EP complete 11/17  - Amio dc'd 12/5 for rising LFTs  - Keep K > 4, Mag > 2.2     Cardiogenic shock requiring IABP (11/1- 11/7, 11/9-)  - Likely 2/2 NSTEMI and ADHF  - 11/1 LHC: pLAD 100 % in-stent restenosis & mRCA, 100 %. PCWP 30. IABP placed.  - 11/1 TTE: LV dilated. EF 32 %. Regional WMAs present, mod (grade 2) LV diastolic dysfunction  - 11/2 TTE: EF 22% and + LV thrombus  - IABP swapped 11/20 to RFA, continue 1:1 support  - Off Milrinone gtt @ 7:30 am 11/11  - James d/c'd due to elevated K levels  - c/w coreg 25 BID for GDMT  - HIT and HAIDER sent (12/3) as per HF   - UNOS status 2 as of 12/6  - 12/5 - reduced hydralazine 100 TID to 75 TID and ISD 40 TID to 30 TID for AL reduction    NSTEMI iso stent re-occlusion of pLAD and 100%  of RCA  - EKG on admission w/ LBBB  - DAPT: c/w ASA, Brilinta d/c'd per transplant w/u  - c/w lipitor 80  - cMR deferred given necessity of IABP  - CT sx not recommending CABG, undergoing AT eval    LV thrombus  - c/w heparin gtt w/ therapeutic PTT    ===================== RENAL =========================  Non-oliguric ARIC, resolved   - Baseline Cr: 1-1.22  - Renal US: no evidence of renal artery stenosis  - Trend BMP, lytes daily, replace as needed  - Continue Strict I/Os, avoid nephrotoxins    Mild Hyponatremia/Hyperkalemia iso ARIC  - Continue fluid restriction   - Urea powder d/c'd per renal  - Trend daily  - Continue monitoring urine output, lytes, SCr/ BUN  - Replete lytes prn with goal K >4 and Mg >2    =============== GASTROINTESTINAL===================  Constipation/ileus, resolved  - regular diet  - bowel reg prn    ===================ENDO====================  Type 2 DM  - A1c 8.3   - Continue lantus, premeal, low ISS  - continue FS    ===================HEMATOLOGIC/ONC ===================  - H/H & plts stable  - Monitor H/H and plts  - VTE PPX: heparin gtt    ==================INFECTIOUS DISEASE================  # Positive blood culture, resolved  - Repeat BCx drawn 11/30 for leukocytosis to 12k - positive on 12/4, G+ rods in anaerobic bottle, suspect contaminant  - Repeat cultures x2 sent 12/4 per transplant ID recs - no growth to date  - Vancomycin by trough (12/4-12/6)  - Final microbial ID: Cutibacterium acnes, per ID this is likely to be a skin contaminant, vanc dc'd.   - Dental eval 12/7 to rule out infectious etiology that would have led to bacteremia, no significant findings on exam.     # Enterococcus faecalis bacteremia, resolved  - BCx 11/17+ for enterococcus faecalis x2, Staph epi x1 (likely contaminant)- pan sensitive   - Urine cx 11/18 + enterococcus faecalis  - BCx 11/18 no growth   - IABP site swapped to RFA 11/20  - s/p Vancomycin 1g q12h (11/18-11/27)  - CT A/P negative for infectious pathology    # COVID, resolved  - Off airborne precautions 11/11    # Pre-transplant ID w/u   - Trend ID recs for serologies   - Colonoscopy 11/17 - normal   - Chest CT 11/17 - improved LLL aeration  - s/p immunizations    ==================DERMATOLOGY================  # Upper Extremity Rash  - Patient developed bilateral upper extremity rash after receiving Hepatitis A & B immunizations on 11/24  - Dermatology consulted 12/5 - not likely to be related to vanco  - Recommend clobetasol 0.05% BID to affected areas   - RVP repeated to rule out viral etiology, negative   ====================ASSESSMENT ==============  58 male with HTN, CAD (s/p PCI 2008), HFrEF, CVA 2018, and T2DM presenting with chest pressure and unknown tachycardia that was shocked x1, [facility] 11/1 found to have in-stent restenosis of pLAD and  of RCA with elevated RA and PA pressures and severely decreased. Admitted to CICU for management of cardiogenic shock and ADHF requiring IABP 11/1 -11/7, with hospital course c/b vfib arrest requiring reinsertion of IABP. Not recommended for ATs per HF. Currently listed for transplant status 2.    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 AT evaluation launched 11/10, he is ABO A. He was discussed during TSC on 11/16 and was approved for listing, however was found to be Bacteremic with 11/17 Blc Cx growing mixed cultures Staph epi and Enterococcus faecalis and started on IV Vanco. Repeat cultures from 11/18 reveal NGTD and therefore was cleared by ID for listing.     He appears adequately supported on IABP at 1:1, electrically quiescent on oral Amiodarone. Cr is improving with holding diuretics. Labs otherwise notable for worsening thrombocytopenia. Awaiting suitable donor. Now with GM + rods in blood culture on 11/30 (reported on 12/4), restarted on vancomycin, and status 7, repeat cultures now negative x48 hours (12/6), now status 2 again.       ====================== NEUROLOGY=====================  Anxiety  - No Seroquel/antipsychotics since vfib arrest and prolonged QTC  - Psych Eval, recommended SSRI, but pt. refused   - Psych recommending atarax PRN  - Continue to monitor mental status    PT/Conditioning  - Continue band exercises while on bedrest s/t IABP    ==================== RESPIRATORY======================  Acute Hypoxemic Respiratory Failure  - s/p x2 intubations for cardiogenic pulm edema and the in setting of cardiac arrest, resolved - extubated 11/10  - Currently maintaining >95% sats on room air  - Continue incentive spirometry and monitoring of sp02    Asthma  - c/w albuterol, symbicort and spiriva  - On trelegy at home  - Continue to monitor SpO2 with goal >94%    ====================CARDIOVASCULAR==================  Vfib arrest i/s/o ischemia  - Lido gtt off 11/13  - PO Amio load - total of 5g per EP complete 11/17  - Amio dc'd 12/5 for rising LFTs  - Keep K > 4, Mag > 2.2     Cardiogenic shock requiring IABP (11/1- 11/7, 11/9-)  - Likely 2/2 NSTEMI and ADHF  - 11/1 LHC: pLAD 100 % in-stent restenosis & mRCA, 100 %. PCWP 30. IABP placed.  - 11/1 TTE: LV dilated. EF 32 %. Regional WMAs present, mod (grade 2) LV diastolic dysfunction  - 11/2 TTE: EF 22% and + LV thrombus  - IABP swapped 11/20 to RFA, continue 1:1 support  - Off Milrinone gtt @ 7:30 am 11/11  - James d/c'd due to elevated K levels  - c/w coreg 25 BID for GDMT  - HIT and HAIDER sent (12/3) as per HF   - UNOS status 2 as of 12/6  - 12/5 - reduced hydralazine 100 TID to 75 TID and ISD 40 TID to 30 TID for AL reduction    NSTEMI iso stent re-occlusion of pLAD and 100%  of RCA  - EKG on admission w/ LBBB  - DAPT: c/w ASA, Brilinta d/c'd per transplant w/u  - c/w lipitor 80  - cMR deferred given necessity of IABP  - CT sx not recommending CABG, undergoing AT eval    LV thrombus  - c/w heparin gtt w/ therapeutic PTT    ===================== RENAL =========================  Non-oliguric ARIC, resolved   - Baseline Cr: 1-1.22  - Renal US: no evidence of renal artery stenosis  - Trend BMP, lytes daily, replace as needed  - Continue Strict I/Os, avoid nephrotoxins    Mild Hyponatremia/Hyperkalemia iso ARIC  - Continue fluid restriction   - Urea powder d/c'd per renal  - Trend daily  - Continue monitoring urine output, lytes, SCr/ BUN  - Replete lytes prn with goal K >4 and Mg >2    =============== GASTROINTESTINAL===================  Constipation/ileus, resolved  - regular diet  - bowel reg prn    ===================ENDO====================  Type 2 DM  - A1c 8.3   - Continue lantus, premeal, low ISS  - continue FS    ===================HEMATOLOGIC/ONC ===================  - H/H & plts stable  - Monitor H/H and plts  - VTE PPX: heparin gtt    ==================INFECTIOUS DISEASE================  # Positive blood culture, resolved  - Repeat BCx drawn 11/30 for leukocytosis to 12k - positive on 12/4, G+ rods in anaerobic bottle, suspect contaminant  - Repeat cultures x2 sent 12/4 per transplant ID recs - no growth to date  - Vancomycin by trough (12/4-12/6)  - Final microbial ID: Cutibacterium acnes, per ID this is likely to be a skin contaminant, vanc dc'd.   - Dental eval 12/7 to rule out infectious etiology that would have led to bacteremia, no significant findings on exam.     # Enterococcus faecalis bacteremia, resolved  - BCx 11/17+ for enterococcus faecalis x2, Staph epi x1 (likely contaminant)- pan sensitive   - Urine cx 11/18 + enterococcus faecalis  - BCx 11/18 no growth   - IABP site swapped to RFA 11/20  - s/p Vancomycin 1g q12h (11/18-11/27)  - CT A/P negative for infectious pathology    # COVID, resolved  - Off airborne precautions 11/11    # Pre-transplant ID w/u   - Trend ID recs for serologies   - Colonoscopy 11/17 - normal   - Chest CT 11/17 - improved LLL aeration  - s/p immunizations    ==================DERMATOLOGY================  # Upper Extremity Rash  - Patient developed bilateral upper extremity rash after receiving Hepatitis A & B immunizations on 11/24  - Dermatology consulted 12/5 - not likely to be related to vanco  - Recommend clobetasol 0.05% BID to affected areas   - RVP repeated to rule out viral etiology, negative

## 2023-12-13 NOTE — PROGRESS NOTE ADULT - SUBJECTIVE AND OBJECTIVE BOX
LD VALENCIA  58y Male  MRN:[id number]    Patient is a 58y old  Male who presents with a chief complaint of NSTEMI (01 Nov 2023 20:29)    HPI:  59yo M w/ hx HTN, CAD w/ 1 stent in 2009, ICH (2008) presenting with abn ekg. Patient presented to [facility] where he was found to have STEMI, recommended to get cath however patient did not want to get it there so it left and came here.  Patient initially had cough, congestion, fever, was placed on antibiotics on Sunday.  Started feeling nauseous and had a presyncopal event after which he presented to ED last night.  Had chest pain as well.  Chest pain is midsternal.  Not currently having chest pain.  Received 4 aspirin 30 min pta. (01 Nov 2023 15:11)      Patient seen and evaluated at bedside in CCU. interval events noted    Interval HPI: remain in ccu. IABP in place        PAST MEDICAL & SURGICAL HISTORY:  HTN (hypertension)      CAD (coronary artery disease)  2009; stent      Intracranial hemorrhage  2008      Respiratory arrest  december 1st      Myocardial infarction, unspecified MI type, unspecified artery      History of coronary artery stent placement          REVIEW OF SYSTEMS:  as per hpi     VITALS:   ICU Vital Signs Last 24 Hrs  T(C): 36.4 (13 Dec 2023 08:00), Max: 36.8 (12 Dec 2023 17:00)  T(F): 97.6 (13 Dec 2023 08:00), Max: 98.2 (12 Dec 2023 17:00)  HR: 74 (13 Dec 2023 11:00) (72 - 101)  BP: 119/63 (13 Dec 2023 07:45) (119/63 - 119/63)  BP(mean): 84 (13 Dec 2023 07:45) (84 - 84)  ABP: --  ABP(mean): --  RR: 15 (13 Dec 2023 11:00) (14 - 25)  SpO2: 95% (13 Dec 2023 10:00) (95% - 100%)    O2 Parameters below as of 13 Dec 2023 10:00  Patient On (Oxygen Delivery Method): room air            PHYSICAL EXAM:  GENERAL: NAD, comfortable   HEAD:  Atraumatic, Normocephalic  EYES: EOMI, PERRLA, conjunctiva and sclera clear  NECK: Supple, No JVD   CHEST/LUNG: Clear to auscultation bilaterally; No wheeze  HEART: Regular rate and rhythm; No murmurs, rubs, or gallops  ABDOMEN: Soft, Nontender, Nondistended; Bowel sounds present  EXTREMITIES:  2+ Peripheral Pulses, No clubbing, cyanosis, or edema  NEUROLOGY: nonfocal  SKIN: +rash  +iabp    Consultant(s) Notes Reviewed:  [x ] YES  [ ] NO  Care Discussed with Consultants/Other Providers [ x] YES  [ ] NO    MEDS:   MEDICATIONS  (STANDING):  aspirin  chewable 81 milliGRAM(s) Oral daily  atorvastatin 80 milliGRAM(s) Oral at bedtime  budesonide  80 MICROgram(s)/formoterol 4.5 MICROgram(s) Inhaler 2 Puff(s) Inhalation two times a day  carvedilol 25 milliGRAM(s) Oral every 12 hours  chlorhexidine 2% Cloths 1 Application(s) Topical daily  clobetasol 0.05% Ointment 1 Application(s) Topical two times a day  heparin  Infusion 1300 Unit(s)/Hr (14 mL/Hr) IV Continuous <Continuous>  hydrALAZINE 75 milliGRAM(s) Oral three times a day  insulin glargine Injectable (LANTUS) 12 Unit(s) SubCutaneous at bedtime  insulin lispro (ADMELOG) corrective regimen sliding scale   SubCutaneous at bedtime  insulin lispro (ADMELOG) corrective regimen sliding scale   SubCutaneous three times a day before meals  insulin lispro Injectable (ADMELOG) 9 Unit(s) SubCutaneous three times a day before meals  isosorbide   dinitrate Tablet (ISORDIL) 30 milliGRAM(s) Oral three times a day  polyethylene glycol 3350 17 Gram(s) Oral two times a day  senna 2 Tablet(s) Oral at bedtime    MEDICATIONS  (PRN):  hydrOXYzine hydrochloride 25 milliGRAM(s) Oral two times a day PRN Anxiety        ALLERGIES:  penicillins (Unknown)      LABS:                                          12.1   7.94  )-----------( 152      ( 13 Dec 2023 02:25 )             36.8   12-13    135  |  106  |  34<H>  ----------------------------<  207<H>  4.3   |  19<L>  |  0.99    Ca    10.0      13 Dec 2023 02:25  Phos  3.8     12-13  Mg     1.8     12-13    TPro  6.9  /  Alb  3.9  /  TBili  0.3  /  DBili  x   /  AST  55<H>  /  ALT  162<H>  /  AlkPhos  68  12-13      < from: CT Abdomen and Pelvis No Cont (11.28.23 @ 03:38) >  IMPRESSION:  Mildly dilated colon and prominent but not overly dilated small bowel   with air-fluid levels. No discrete transition point. Findings are   suggestive of ileus.    Intra-aortic balloon pump with the inferior marker at the level of the   inferior mesenteric artery and the balloon overlying the origins of the   renal arteries, celiac artery, and superior mesenteric artery. Consider   repositioning. Concern for IABP positioning was discussed with LANETTE Hawthorne   on 11/28/2023 at 3:42 AM by Dr. Shepard.    < end of copied text >          < from: Colonoscopy (11.17.23 @ 10:35) >                                                                                                        Impression:          - The entire examined colon is normal on direct and retroflexion views.                       - No specimens collected.  Recommendation:      - Resume previous diet today.                       - No large polyps or masses detected, No objection from GI standpoint to                 proceed with heart transplantation/advanced heart therapies.                       - Please call back should any further questions or concerns arise.    < end of copied text >     < from: Xray Chest 1 View- PORTABLE-Urgent (11.01.23 @ 07:42) >    IMPRESSION:  Clear lungs.    ---End of Report ---        < end of copied text >  < from: TTE W or WO Ultrasound Enhancing Agent (11.01.23 @ 10:23) >  _____________________________     CONCLUSIONS:      1. Left ventricular cavity is moderately dilated. Left ventricular wall thickness is normal. Left ventricular systolic function is severely decreased with an ejection fraction of 32 % by Chinchilla's method of disks. Regional wall motion abnormalities present.   2. Multiple segmental abnormalities exist. See findings.   3. There is moderate (grade 2) left ventricular diastolic dysfunction, with indeterminant filling pressure.   4. Normal right ventricular cavity size, wall thickness, and systolic function.   5. No significant valvular disease.   6. No pericardial effusion seen.   7. Compared to the transthoracic echocardiogram performed on 1/25/2017 the areas of akinesis are unchanged but there has been a decline in LV systolic function with new areas of hypokinesis.    __________________________________________________________________    < end of copied text >  < from: TTE Limited W or WO Ultrasound Enhancing Agent (11.02.23 @ 07:41) >  __________________________     CONCLUSIONS:      1. After obtaining consent, Definity ultrasound enhancing agent was given for enhanced left ventricular opacification and improved delineation of the left ventricular endocardial borders. Left ventricular systolic function is severely decreased with a calculated ejection fraction of 22 % by the Chinchilla's biplane method of disks. There is a left ventricular thrombus.   2. Findings were discussed with Litzy BOSS on 11/2/2023 at 8.49am.   3. There is a left ventricular thrombus.    _________________________________________________________________    < end of copied text >   LD VALENCIA  58y Male  MRN:[id number]    Patient is a 58y old  Male who presents with a chief complaint of NSTEMI (01 Nov 2023 20:29)    HPI:  59yo M w/ hx HTN, CAD w/ 1 stent in 2009, ICH (2008) presenting with abn ekg. Patient presented to [facility] where he was found to have STEMI, recommended to get cath however patient did not want to get it there so it left and came here.  Patient initially had cough, congestion, fever, was placed on antibiotics on Sunday.  Started feeling nauseous and had a presyncopal event after which he presented to ED last night.  Had chest pain as well.  Chest pain is midsternal.  Not currently having chest pain.  Received 4 aspirin 30 min pta. (01 Nov 2023 15:11)      Patient seen and evaluated at bedside in CCU. interval events noted    Interval HPI: remain in ccu. IABP in place        PAST MEDICAL & SURGICAL HISTORY:  HTN (hypertension)      CAD (coronary artery disease)  2009; stent      Intracranial hemorrhage  2008      Respiratory arrest  december 1st      Myocardial infarction, unspecified MI type, unspecified artery      History of coronary artery stent placement          REVIEW OF SYSTEMS:  as per hpi     VITALS:   ICU Vital Signs Last 24 Hrs  T(C): 36.4 (13 Dec 2023 08:00), Max: 36.8 (12 Dec 2023 17:00)  T(F): 97.6 (13 Dec 2023 08:00), Max: 98.2 (12 Dec 2023 17:00)  HR: 74 (13 Dec 2023 11:00) (72 - 101)  BP: 119/63 (13 Dec 2023 07:45) (119/63 - 119/63)  BP(mean): 84 (13 Dec 2023 07:45) (84 - 84)  ABP: --  ABP(mean): --  RR: 15 (13 Dec 2023 11:00) (14 - 25)  SpO2: 95% (13 Dec 2023 10:00) (95% - 100%)    O2 Parameters below as of 13 Dec 2023 10:00  Patient On (Oxygen Delivery Method): room air            PHYSICAL EXAM:  GENERAL: NAD, comfortable   HEAD:  Atraumatic, Normocephalic  EYES: EOMI, PERRLA, conjunctiva and sclera clear  NECK: Supple, No JVD   CHEST/LUNG: Clear to auscultation bilaterally; No wheeze  HEART: Regular rate and rhythm; No murmurs, rubs, or gallops  ABDOMEN: Soft, Nontender, Nondistended; Bowel sounds present  EXTREMITIES:  2+ Peripheral Pulses, No clubbing, cyanosis, or edema  NEUROLOGY: nonfocal  SKIN: +rash  +iabp    Consultant(s) Notes Reviewed:  [x ] YES  [ ] NO  Care Discussed with Consultants/Other Providers [ x] YES  [ ] NO    MEDS:   MEDICATIONS  (STANDING):  aspirin  chewable 81 milliGRAM(s) Oral daily  atorvastatin 80 milliGRAM(s) Oral at bedtime  budesonide  80 MICROgram(s)/formoterol 4.5 MICROgram(s) Inhaler 2 Puff(s) Inhalation two times a day  carvedilol 25 milliGRAM(s) Oral every 12 hours  chlorhexidine 2% Cloths 1 Application(s) Topical daily  clobetasol 0.05% Ointment 1 Application(s) Topical two times a day  heparin  Infusion 1300 Unit(s)/Hr (14 mL/Hr) IV Continuous <Continuous>  hydrALAZINE 75 milliGRAM(s) Oral three times a day  insulin glargine Injectable (LANTUS) 12 Unit(s) SubCutaneous at bedtime  insulin lispro (ADMELOG) corrective regimen sliding scale   SubCutaneous at bedtime  insulin lispro (ADMELOG) corrective regimen sliding scale   SubCutaneous three times a day before meals  insulin lispro Injectable (ADMELOG) 9 Unit(s) SubCutaneous three times a day before meals  isosorbide   dinitrate Tablet (ISORDIL) 30 milliGRAM(s) Oral three times a day  polyethylene glycol 3350 17 Gram(s) Oral two times a day  senna 2 Tablet(s) Oral at bedtime    MEDICATIONS  (PRN):  hydrOXYzine hydrochloride 25 milliGRAM(s) Oral two times a day PRN Anxiety        ALLERGIES:  penicillins (Unknown)      LABS:                                          12.1   7.94  )-----------( 152      ( 13 Dec 2023 02:25 )             36.8   12-13    135  |  106  |  34<H>  ----------------------------<  207<H>  4.3   |  19<L>  |  0.99    Ca    10.0      13 Dec 2023 02:25  Phos  3.8     12-13  Mg     1.8     12-13    TPro  6.9  /  Alb  3.9  /  TBili  0.3  /  DBili  x   /  AST  55<H>  /  ALT  162<H>  /  AlkPhos  68  12-13      < from: CT Abdomen and Pelvis No Cont (11.28.23 @ 03:38) >  IMPRESSION:  Mildly dilated colon and prominent but not overly dilated small bowel   with air-fluid levels. No discrete transition point. Findings are   suggestive of ileus.    Intra-aortic balloon pump with the inferior marker at the level of the   inferior mesenteric artery and the balloon overlying the origins of the   renal arteries, celiac artery, and superior mesenteric artery. Consider   repositioning. Concern for IABP positioning was discussed with LANETTE Hawthorne   on 11/28/2023 at 3:42 AM by Dr. Shepard.    < end of copied text >          < from: Colonoscopy (11.17.23 @ 10:35) >                                                                                                        Impression:          - The entire examined colon is normal on direct and retroflexion views.                       - No specimens collected.  Recommendation:      - Resume previous diet today.                       - No large polyps or masses detected, No objection from GI standpoint to                 proceed with heart transplantation/advanced heart therapies.                       - Please call back should any further questions or concerns arise.    < end of copied text >     < from: Xray Chest 1 View- PORTABLE-Urgent (11.01.23 @ 07:42) >    IMPRESSION:  Clear lungs.    ---End of Report ---        < end of copied text >  < from: TTE W or WO Ultrasound Enhancing Agent (11.01.23 @ 10:23) >  _____________________________     CONCLUSIONS:      1. Left ventricular cavity is moderately dilated. Left ventricular wall thickness is normal. Left ventricular systolic function is severely decreased with an ejection fraction of 32 % by Chinchilla's method of disks. Regional wall motion abnormalities present.   2. Multiple segmental abnormalities exist. See findings.   3. There is moderate (grade 2) left ventricular diastolic dysfunction, with indeterminant filling pressure.   4. Normal right ventricular cavity size, wall thickness, and systolic function.   5. No significant valvular disease.   6. No pericardial effusion seen.   7. Compared to the transthoracic echocardiogram performed on 1/25/2017 the areas of akinesis are unchanged but there has been a decline in LV systolic function with new areas of hypokinesis.    __________________________________________________________________    < end of copied text >  < from: TTE Limited W or WO Ultrasound Enhancing Agent (11.02.23 @ 07:41) >  __________________________     CONCLUSIONS:      1. After obtaining consent, Definity ultrasound enhancing agent was given for enhanced left ventricular opacification and improved delineation of the left ventricular endocardial borders. Left ventricular systolic function is severely decreased with a calculated ejection fraction of 22 % by the Chinchilla's biplane method of disks. There is a left ventricular thrombus.   2. Findings were discussed with Litzy BOSS on 11/2/2023 at 8.49am.   3. There is a left ventricular thrombus.    _________________________________________________________________    < end of copied text >

## 2023-12-13 NOTE — PROCEDURE NOTE - NSPROCDETAILS_GEN_ALL_CORE
location identified, draped/prepped, sterile technique used, needle inserted/introduced/positive blood return obtained via catheter/connected to a pressurized flush line/sutured in place/hemostasis with direct pressure, dressing applied/Seldinger technique/all materials/supplies accounted for at end of procedure
location identified, draped/prepped, sterile technique used, needle inserted/introduced/positive blood return obtained via catheter/connected to a pressurized flush line/sutured in place/hemostasis with direct pressure, dressing applied/Seldinger technique/all materials/supplies accounted for at end of procedure
guidewire recovered/lumen(s) aspirated and flushed/ultrasound guidance with use of sterile gel and probe cove
location identified, draped/prepped, sterile technique used, needle inserted/introduced/positive blood return obtained via catheter/connected to a pressurized flush line/sutured in place/hemostasis with direct pressure, dressing applied/Seldinger technique/all materials/supplies accounted for at end of procedure

## 2023-12-13 NOTE — PROCEDURE NOTE - NSPROCNAME_GEN_A_CORE
General
Arterial Puncture/Cannulation
General
Arterial Puncture/Cannulation
General
Arterial Puncture/Cannulation
Central Line Insertion
Central Line Insertion

## 2023-12-13 NOTE — PROGRESS NOTE ADULT - ASSESSMENT
59 yo male h/o htn, cad s/p pci, ICH, here with NSTEMI  s/p intubation and cath. now in CCU    NSTEMI  s/p  cath  cath results noted. multi vessel dz., CTSx f/u.   hep gtt  pt with vtach/fib arrest again on 11/8. s/p ACLS and ROSC.   now extubated  IABP in place  mngt as per CCU  plan for transplant as per HF and transplant team  transplant w/u ongoing.   s/p colonoscopy 11/17. normal  now listed for transplant as of 11/22    LV thrombus   hep gtt    acute on chronic systolic heart failure  heart failure team f/u    pna  resolved     covid  resolved  now off isolation     h/o ICH  resolved    agitation  psy consult f/u    bacteremia  id following  iv abx  f/u repeat cult      vomiting and abd pain  ileus on CT  bowel regimen  gi f/u  +bm    now resolved.     IABP malposition on CT  mngt as per ccu. iabp adjusted    rash  possible drug reaction  derm f/u  improving    mngt as per CCU team          Advanced care planning was discussed with patient and family.  Advanced care planning forms were reviewed and discussed as appropriate.  Differential diagnosis and plan of care discussed with patient after the evaluation.   Pain assessed and judicious use of narcotics when appropriate was discussed.  Importance of Fall prevention discussed.  Counseling on Smoking and Alcohol cessation was offered when appropriate.  Counseling on Diet, exercise, and medication compliance was done.       Approx 75 minutes spent. 57 yo male h/o htn, cad s/p pci, ICH, here with NSTEMI  s/p intubation and cath. now in CCU    NSTEMI  s/p  cath  cath results noted. multi vessel dz., CTSx f/u.   hep gtt  pt with vtach/fib arrest again on 11/8. s/p ACLS and ROSC.   now extubated  IABP in place  mngt as per CCU  plan for transplant as per HF and transplant team  transplant w/u ongoing.   s/p colonoscopy 11/17. normal  now listed for transplant as of 11/22    LV thrombus   hep gtt    acute on chronic systolic heart failure  heart failure team f/u    pna  resolved     covid  resolved  now off isolation     h/o ICH  resolved    agitation  psy consult f/u    bacteremia  id following  iv abx  f/u repeat cult      vomiting and abd pain  ileus on CT  bowel regimen  gi f/u  +bm    now resolved.     IABP malposition on CT  mngt as per ccu. iabp adjusted    rash  possible drug reaction  derm f/u  improving    mngt as per CCU team          Advanced care planning was discussed with patient and family.  Advanced care planning forms were reviewed and discussed as appropriate.  Differential diagnosis and plan of care discussed with patient after the evaluation.   Pain assessed and judicious use of narcotics when appropriate was discussed.  Importance of Fall prevention discussed.  Counseling on Smoking and Alcohol cessation was offered when appropriate.  Counseling on Diet, exercise, and medication compliance was done.       Approx 75 minutes spent.

## 2023-12-13 NOTE — PROCEDURE NOTE - PROCEDURE DATE TIME, MLM
09-Nov-2023 00:34
02-Nov-2023 02:58
09-Nov-2023 00:49
25-Nov-2023 16:00
07-Nov-2023 10:54
25-Nov-2023 15:00
09-Nov-2023 00:15
13-Dec-2023 17:54

## 2023-12-13 NOTE — PROGRESS NOTE ADULT - SUBJECTIVE AND OBJECTIVE BOX
DEVORAH VALENCIA  MRN-[id number]  Patient is a 58y old  Male who presents with a chief complaint of Cardiogenic shock (10 Dec 2023 20:20)    HPI:  pt seen and approx 1:30 pm  in [facility]    57yo M w/ hx HTN, CAD w/ 1 stent in , ICH () presenting with abn ekg. Patient presented to [facility] where he was found to have STEMI, recommended to get cath however patient did not want to get it there so it left and came here.  Patient initially had cough, congestion, fever, was placed on antibiotics on .  Started feeling nauseous and had a presyncopal event after which he presented to ED last night.  Had chest pain as well.  Chest pain is midsternal.  Not currently having chest pain.  Received 4 aspirin 30 min pta. (2023 15:11)      Hospital Course:    24 HOUR EVENTS:    REVIEW OF SYSTEMS:    CONSTITUTIONAL: No weakness, fevers or chills  EYES/ENT: No visual changes;  No vertigo or throat pain   NECK: No pain or stiffness  RESPIRATORY: No cough, wheezing, hemoptysis; No shortness of breath  CARDIOVASCULAR: No chest pain or palpitations  GASTROINTESTINAL: No abdominal or epigastric pain. No nausea, vomiting, or hematemesis; No diarrhea or constipation. No melena or hematochezia.  GENITOURINARY: No dysuria, frequency or hematuria  NEUROLOGICAL: No numbness or weakness  SKIN: No itching, rashes      ICU Vital Signs Last 24 Hrs  T(C): 36.7 (13 Dec 2023 20:00), Max: 36.8 (13 Dec 2023 00:00)  T(F): 98 (13 Dec 2023 20:00), Max: 98.2 (13 Dec 2023 00:00)  HR: 72 (13 Dec 2023 22:00) (72 - 87)  BP: 86/54 (13 Dec 2023 17:00) (86/54 - 119/63)  BP(mean): 66 (13 Dec 2023 17:00) (66 - 84)  ABP: --  ABP(mean): --  RR: 16 (13 Dec 2023 22:00) (14 - 23)  SpO2: 98% (13 Dec 2023 22:00) (95% - 100%)    O2 Parameters below as of 13 Dec 2023 20:00  Patient On (Oxygen Delivery Method): room air            CVP(mm Hg): --  CO: --  CI: --  PA: --  PA(mean): --  PA(direct): --  PCWP: --  LA: --  RA: --  SVR: --  SVRI: --  PVR: --  PVRI: --    POCT Blood Glucose.: 174 mg/dL (23 @ 22:02)  POCT Blood Glucose.: 144 mg/dL (23 @ 16:41)  POCT Blood Glucose.: 166 mg/dL (23 @ 12:33)  POCT Blood Glucose.: 130 mg/dL (23 @ 07:49)  POCT Blood Glucose.: 124 mg/dL (23 @ 22:33)    CAPILLARY BLOOD GLUCOSE      POCT Blood Glucose.: 174 mg/dL (13 Dec 2023 22:02)      PHYSICAL EXAM:  GENERAL: No acute distress, well-developed  HEAD:  Atraumatic, Normocephalic  EYES: EOMI, PERRLA, conjunctiva and sclera clear  NECK: Supple, no lymphadenopathy, no JVD  CHEST/LUNG: CTAB; No wheezes, rales, or rhonchi  HEART: Regular rate and rhythm. Normal S1/S2. No murmurs, rubs, or gallops  ABDOMEN: Soft, non-tender, non-distended; normal bowel sounds, no organomegaly  EXTREMITIES:  2+ peripheral pulses b/l, No clubbing, cyanosis, or edema  NEUROLOGY: A&O x 3, no focal deficits  SKIN: No rashes or lesions    ============================I/O===========================   I&O's Detail    12 Dec 2023 07:01  -  13 Dec 2023 07:00  --------------------------------------------------------  IN:    Heparin: 336 mL    Oral Fluid: 1200 mL  Total IN: 1536 mL    OUT:    Voided (mL): 2250 mL  Total OUT: 2250 mL    Total NET: -714 mL      13 Dec 2023 07:01  -  13 Dec 2023 22:18  --------------------------------------------------------  IN:    Heparin: 154 mL    Oral Fluid: 720 mL  Total IN: 874 mL    OUT:    Voided (mL): 500 mL  Total OUT: 500 mL    Total NET: 374 mL        ============================ LABS =========================                        12.1   7.94  )-----------( 152      ( 13 Dec 2023 02:25 )             36.8         135  |  106  |  34<H>  ----------------------------<  207<H>  4.3   |  19<L>  |  0.99    Ca    10.0      13 Dec 2023 02:25  Phos  3.8       Mg     1.8         TPro  6.9  /  Alb  3.9  /  TBili  0.3  /  DBili  x   /  AST  55<H>  /  ALT  162<H>  /  AlkPhos  68                  LIVER FUNCTIONS - ( 13 Dec 2023 02:25 )  Alb: 3.9 g/dL / Pro: 6.9 g/dL / ALK PHOS: 68 U/L / ALT: 162 U/L / AST: 55 U/L / GGT: x           PT/INR - ( 13 Dec 2023 02:25 )   PT: 10.9 sec;   INR: 1.04 ratio         PTT - ( 13 Dec 2023 02:25 )  PTT:67.0 sec    Lactate, Blood: 1.3 mmol/L (23 @ 02:25)    Urinalysis Basic - ( 13 Dec 2023 02:25 )    Color: x / Appearance: x / SG: x / pH: x  Gluc: 207 mg/dL / Ketone: x  / Bili: x / Urobili: x   Blood: x / Protein: x / Nitrite: x   Leuk Esterase: x / RBC: x / WBC x   Sq Epi: x / Non Sq Epi: x / Bacteria: x      ======================Micro/Rad/Cardio=================  Telemtry: Reviewed   EKG: Reviewed  CXR: Reviewed  Culture: Reviewed   Echo:   Cath: Cardiac Cath Lab - Adult:   [facility]  Department of Cardiology  [address] [place] [id number]  [phone]  Cath Lab Report -- Comprehensive Report  Patient: LD VALENCIA  Study date: 2017  Account number: [id number]  MR number: [id number]  : 1965  Gender: Male  Race: W  Case Physician(s):  Jairon Holguin M.D.  Referring Physician:  Luc Lynn M.D.  INDICATIONS: Unstable angina - CCS4.  HISTORY: The patient has a history of coronary artery disease. The patient  hashypertension and medication-treated dyslipidemia.  PROCEDURE:  --  Left heart catheterization with ventriculography.  --  Left coronary angiography.  --  Right coronary angiography.  TECHNIQUE: The risks and alternatives of the procedures and conscious  sedation were explained to the patient and informed consent was obtained.  Cardiac catheterization performed electively.  Local anesthetic given. Right radial artery access. A 6FR PRELUDE KIT was  inserted in the vessel. Left heart catheterization. Ventriculography was  performed. A 5FR FR4.0 EXPO catheter was utilized. Left coronary artery  angiography. The vessel was injected utilizing a 5FR FL3.5 EXPO catheter.  Right coronary artery angiography. The vessel was injected utilizing a 5FR  FR4.0 EXPO catheter. RADIATION EXPOSURE: 1.1 min.  CONTRAST GIVEN: Omnipaque 55 ml.  MEDICATIONS GIVEN: Midazolam, 1 mg, IV. Fentanyl, 25 mcg, IV. Verapamil  (Isoptin, Calan, Covera), 2.5 mg, IA. Heparin, 3000 units, IA.  VENTRICLES: Global left ventricular function was moderately depressed. EF  estimated was 40 %.  CORONARY VESSELS: The coronary circulation is right dominant.  LM:   --  LM: Normal.  LAD:   --  Proximal LAD: There was a 50 % stenosis.  CX:   --  Circumflex: Normal.  RCA:   --  Mid RCA: There was a 40 % stenosis.  --  Distal RCA: There was a 50 % stenosis.  COMPLICATIONS: There were no complications.  DIAGNOSTIC RECOMMENDATIONS: The patient should continue with the present  medications.  Prepared and signed by  Jairon Holguin M.D.  Signed 2017 12:20:13  HEMODYNAMIC TABLES  Pressures:  Baseline/ Room Air  Pressures:  - HR: 78  Pressures:  - Rhythm:  Pressures:  -- Aortic Pressure (S/D/M): --/--/99  Pressures:  -- Left Ventricle (s/edp): 157/39/--  Outputs:  Baseline/ Room Air  Outputs:  -- CALCULATIONS[age]  Outputs:  -- CALCULATIONS: Body Surface Area: 2.05  Outputs:  -- CALCULATIONS: Height in cm: 175.00  Outputs:  -- CALCULATIONS: Sex: Male  Outputs:  -- CALCULATIONS: Weight in k.40 (17 @ 21:55)    ======================================================  PAST MEDICAL & SURGICAL HISTORY:  HTN (hypertension)      CAD (coronary artery disease)  ; stent      Intracranial hemorrhage        Respiratory arrest        Myocardial infarction, unspecified MI type, unspecified artery      History of coronary artery stent placement        ====================ASSESSMENT ==============            Plan:  ====================CARDIOVASCULAR==================    Mechaincal Circulatory Support [ ] IABP,  [ ] Impella 2.5,  [ ] Impella CP  Settings:     ==Hemodynamics==    CVP:   PCWP:  PA S/D:   Cardiac Output:  Cardiac Index:   SVR:      ==Coronaries==    Last ischemic workup:   Antiplatelet regimen:   Anticoagulant:   Statin:   Beta blocker:      ==Pump==    LVEF:                              Regional Wall Motion Abnormaility?:  [ ]Yes   [ ] No, If Yes, Details  Diastolic function:  RV function:   Any change frim prior?: [ ] Yes   [ ] No, If Yes, Details:       ==Rhythm==    Current rhythm:  AM EKG Interpretation:   Anti-arrhythmic therapies:   TVP with settings:     carvedilol 25 milliGRAM(s) Oral every 12 hours  hydrALAZINE 75 milliGRAM(s) Oral three times a day  isosorbide   dinitrate Tablet (ISORDIL) 30 milliGRAM(s) Oral three times a day      ====================== NEUROLOGY=====================  hydrOXYzine hydrochloride 25 milliGRAM(s) Oral two times a day PRN Anxiety    ==================== RESPIRATORY======================  Mechanical Ventilation:        budesonide  80 MICROgram(s)/formoterol 4.5 MICROgram(s) Inhaler 2 Puff(s) Inhalation two times a day    ===================== RENAL =========================    23 @ 07:23 @ 07:00  --------------------------------------------------------  IN: 1536 mL / OUT: 2250 mL / NET: -714 mL    23 @ 07:23 @ 22:18  --------------------------------------------------------  IN: 874 mL / OUT: 500 mL / NET: 374 mL      Renal Replacement Therapy:  [ ] CRRT      [ ] IHD, Last Session:    Fluid removal:     [ ] Diuretic therapy, Regimen:       ==================== GASTROINTESTINAL===================    Last BM:   Indication for Stress Ulcer Prophylaxis, [ ] Yes    [ ] No   If Yes, Medication:     polyethylene glycol 3350 17 Gram(s) Oral two times a day  senna 2 Tablet(s) Oral at bedtime    ========================INFECTIOUS DISEASE================  T(C): 36.7 (23 @ 20:00), Max: 36.8 (23 @ 00:00)  WBC Count: 7.94 K/uL (23 @ 02:25)  WBC Count: 9.66 K/uL (23 @ 00:38)  WBC Count: 8.69 K/uL (23 @ 01:31)        Current Antibiotics with start date:       ===================HEMATOLOGIC/ONC ===================  Hemoglobin: 12.1 g/dL (23 @ 02:25)  Hemoglobin: 12.2 g/dL (23 @ 00:38)  Hemoglobin: 11.4 g/dL (23 @ 01:31)    Platelet Count - Automated: 152 K/uL (23 @ 02:25)  Platelet Count - Automated: 147 K/uL (23 @ 00:38)  Platelet Count - Automated: 114 K/uL (23 @ 01:31)    Chemical VTE Prophylaxis:  [ ] Lovenox    [ ] SQH   [ ]NA  Systemic Anticogaulation:  [ ] Yes    [ ] No,  If Yes, Medication:     aspirin  chewable 81 milliGRAM(s) Oral daily  heparin  Infusion 1300 Unit(s)/Hr (14 mL/Hr) IV Continuous <Continuous>    =======================    ENDOCRINE  =====================  POCT Blood Glucose.: 174 mg/dL (23 @ 22:02)  POCT Blood Glucose.: 144 mg/dL (23 @ 16:41)  POCT Blood Glucose.: 166 mg/dL (23 @ 12:33)  POCT Blood Glucose.: 130 mg/dL (23 @ 07:49)  POCT Blood Glucose.: 124 mg/dL (23 @ 22:33)        atorvastatin 80 milliGRAM(s) Oral at bedtime  insulin glargine Injectable (LANTUS) 12 Unit(s) SubCutaneous at bedtime  insulin lispro (ADMELOG) corrective regimen sliding scale   SubCutaneous at bedtime  insulin lispro (ADMELOG) corrective regimen sliding scale   SubCutaneous three times a day before meals  insulin lispro Injectable (ADMELOG) 9 Unit(s) SubCutaneous three times a day before meals      Patient requires continuous monitoring with bedside rhythm monitoring, pulse ox monitoring, and intermittent blood gas analysis. Care plan discussed with ICU care team. Patient remained critical and at risk for life threatening decompensation.  Patient seen, examined and plan discussed with CCU team during rounds.       I have personally provided ____ minutes of critical care time excluding time spent on separate procedures, in addition to initial critical care time provided by the CICU Attending, Dr. Lees/ Ladarius/ Marisa/ Saba/ Juan/ Consuelo.       DEVORAH VALENCIA  MRN-[id number]  Patient is a 58y old  Male who presents with a chief complaint of Cardiogenic shock (10 Dec 2023 20:20)    HPI:  pt seen and approx 1:30 pm  in [facility]    59yo M w/ hx HTN, CAD w/ 1 stent in , ICH () presenting with abn ekg. Patient presented to [facility] where he was found to have STEMI, recommended to get cath however patient did not want to get it there so it left and came here.  Patient initially had cough, congestion, fever, was placed on antibiotics on .  Started feeling nauseous and had a presyncopal event after which he presented to ED last night.  Had chest pain as well.  Chest pain is midsternal.  Not currently having chest pain.  Received 4 aspirin 30 min pta. (2023 15:11)      Hospital Course:    24 HOUR EVENTS:    REVIEW OF SYSTEMS:    CONSTITUTIONAL: No weakness, fevers or chills  EYES/ENT: No visual changes;  No vertigo or throat pain   NECK: No pain or stiffness  RESPIRATORY: No cough, wheezing, hemoptysis; No shortness of breath  CARDIOVASCULAR: No chest pain or palpitations  GASTROINTESTINAL: No abdominal or epigastric pain. No nausea, vomiting, or hematemesis; No diarrhea or constipation. No melena or hematochezia.  GENITOURINARY: No dysuria, frequency or hematuria  NEUROLOGICAL: No numbness or weakness  SKIN: No itching, rashes      ICU Vital Signs Last 24 Hrs  T(C): 36.7 (13 Dec 2023 20:00), Max: 36.8 (13 Dec 2023 00:00)  T(F): 98 (13 Dec 2023 20:00), Max: 98.2 (13 Dec 2023 00:00)  HR: 72 (13 Dec 2023 22:00) (72 - 87)  BP: 86/54 (13 Dec 2023 17:00) (86/54 - 119/63)  BP(mean): 66 (13 Dec 2023 17:00) (66 - 84)  ABP: --  ABP(mean): --  RR: 16 (13 Dec 2023 22:00) (14 - 23)  SpO2: 98% (13 Dec 2023 22:00) (95% - 100%)    O2 Parameters below as of 13 Dec 2023 20:00  Patient On (Oxygen Delivery Method): room air            CVP(mm Hg): --  CO: --  CI: --  PA: --  PA(mean): --  PA(direct): --  PCWP: --  LA: --  RA: --  SVR: --  SVRI: --  PVR: --  PVRI: --    POCT Blood Glucose.: 174 mg/dL (23 @ 22:02)  POCT Blood Glucose.: 144 mg/dL (23 @ 16:41)  POCT Blood Glucose.: 166 mg/dL (23 @ 12:33)  POCT Blood Glucose.: 130 mg/dL (23 @ 07:49)  POCT Blood Glucose.: 124 mg/dL (23 @ 22:33)    CAPILLARY BLOOD GLUCOSE      POCT Blood Glucose.: 174 mg/dL (13 Dec 2023 22:02)      PHYSICAL EXAM:  GENERAL: No acute distress, well-developed  HEAD:  Atraumatic, Normocephalic  EYES: EOMI, PERRLA, conjunctiva and sclera clear  NECK: Supple, no lymphadenopathy, no JVD  CHEST/LUNG: CTAB; No wheezes, rales, or rhonchi  HEART: Regular rate and rhythm. Normal S1/S2. No murmurs, rubs, or gallops  ABDOMEN: Soft, non-tender, non-distended; normal bowel sounds, no organomegaly  EXTREMITIES:  2+ peripheral pulses b/l, No clubbing, cyanosis, or edema  NEUROLOGY: A&O x 3, no focal deficits  SKIN: No rashes or lesions    ============================I/O===========================   I&O's Detail    12 Dec 2023 07:01  -  13 Dec 2023 07:00  --------------------------------------------------------  IN:    Heparin: 336 mL    Oral Fluid: 1200 mL  Total IN: 1536 mL    OUT:    Voided (mL): 2250 mL  Total OUT: 2250 mL    Total NET: -714 mL      13 Dec 2023 07:01  -  13 Dec 2023 22:18  --------------------------------------------------------  IN:    Heparin: 154 mL    Oral Fluid: 720 mL  Total IN: 874 mL    OUT:    Voided (mL): 500 mL  Total OUT: 500 mL    Total NET: 374 mL        ============================ LABS =========================                        12.1   7.94  )-----------( 152      ( 13 Dec 2023 02:25 )             36.8         135  |  106  |  34<H>  ----------------------------<  207<H>  4.3   |  19<L>  |  0.99    Ca    10.0      13 Dec 2023 02:25  Phos  3.8       Mg     1.8         TPro  6.9  /  Alb  3.9  /  TBili  0.3  /  DBili  x   /  AST  55<H>  /  ALT  162<H>  /  AlkPhos  68                  LIVER FUNCTIONS - ( 13 Dec 2023 02:25 )  Alb: 3.9 g/dL / Pro: 6.9 g/dL / ALK PHOS: 68 U/L / ALT: 162 U/L / AST: 55 U/L / GGT: x           PT/INR - ( 13 Dec 2023 02:25 )   PT: 10.9 sec;   INR: 1.04 ratio         PTT - ( 13 Dec 2023 02:25 )  PTT:67.0 sec    Lactate, Blood: 1.3 mmol/L (23 @ 02:25)    Urinalysis Basic - ( 13 Dec 2023 02:25 )    Color: x / Appearance: x / SG: x / pH: x  Gluc: 207 mg/dL / Ketone: x  / Bili: x / Urobili: x   Blood: x / Protein: x / Nitrite: x   Leuk Esterase: x / RBC: x / WBC x   Sq Epi: x / Non Sq Epi: x / Bacteria: x      ======================Micro/Rad/Cardio=================  Telemtry: Reviewed   EKG: Reviewed  CXR: Reviewed  Culture: Reviewed   Echo:   Cath: Cardiac Cath Lab - Adult:   [facility]  Department of Cardiology  [address] [place] [id number]  [phone]  Cath Lab Report -- Comprehensive Report  Patient: LD VALENCIA  Study date: 2017  Account number: [id number]  MR number: [id number]  : 1965  Gender: Male  Race: W  Case Physician(s):  Jairon Holguin M.D.  Referring Physician:  Luc Lynn M.D.  INDICATIONS: Unstable angina - CCS4.  HISTORY: The patient has a history of coronary artery disease. The patient  hashypertension and medication-treated dyslipidemia.  PROCEDURE:  --  Left heart catheterization with ventriculography.  --  Left coronary angiography.  --  Right coronary angiography.  TECHNIQUE: The risks and alternatives of the procedures and conscious  sedation were explained to the patient and informed consent was obtained.  Cardiac catheterization performed electively.  Local anesthetic given. Right radial artery access. A 6FR PRELUDE KIT was  inserted in the vessel. Left heart catheterization. Ventriculography was  performed. A 5FR FR4.0 EXPO catheter was utilized. Left coronary artery  angiography. The vessel was injected utilizing a 5FR FL3.5 EXPO catheter.  Right coronary artery angiography. The vessel was injected utilizing a 5FR  FR4.0 EXPO catheter. RADIATION EXPOSURE: 1.1 min.  CONTRAST GIVEN: Omnipaque 55 ml.  MEDICATIONS GIVEN: Midazolam, 1 mg, IV. Fentanyl, 25 mcg, IV. Verapamil  (Isoptin, Calan, Covera), 2.5 mg, IA. Heparin, 3000 units, IA.  VENTRICLES: Global left ventricular function was moderately depressed. EF  estimated was 40 %.  CORONARY VESSELS: The coronary circulation is right dominant.  LM:   --  LM: Normal.  LAD:   --  Proximal LAD: There was a 50 % stenosis.  CX:   --  Circumflex: Normal.  RCA:   --  Mid RCA: There was a 40 % stenosis.  --  Distal RCA: There was a 50 % stenosis.  COMPLICATIONS: There were no complications.  DIAGNOSTIC RECOMMENDATIONS: The patient should continue with the present  medications.  Prepared and signed by  Jairon Holguin M.D.  Signed 2017 12:20:13  HEMODYNAMIC TABLES  Pressures:  Baseline/ Room Air  Pressures:  - HR: 78  Pressures:  - Rhythm:  Pressures:  -- Aortic Pressure (S/D/M): --/--/99  Pressures:  -- Left Ventricle (s/edp): 157/39/--  Outputs:  Baseline/ Room Air  Outputs:  -- CALCULATIONS[age]  Outputs:  -- CALCULATIONS: Body Surface Area: 2.05  Outputs:  -- CALCULATIONS: Height in cm: 175.00  Outputs:  -- CALCULATIONS: Sex: Male  Outputs:  -- CALCULATIONS: Weight in k.40 (17 @ 21:55)    ======================================================  PAST MEDICAL & SURGICAL HISTORY:  HTN (hypertension)      CAD (coronary artery disease)  ; stent      Intracranial hemorrhage        Respiratory arrest        Myocardial infarction, unspecified MI type, unspecified artery      History of coronary artery stent placement        ====================ASSESSMENT ==============            Plan:  ====================CARDIOVASCULAR==================    Mechaincal Circulatory Support [ ] IABP,  [ ] Impella 2.5,  [ ] Impella CP  Settings:     ==Hemodynamics==    CVP:   PCWP:  PA S/D:   Cardiac Output:  Cardiac Index:   SVR:      ==Coronaries==    Last ischemic workup:   Antiplatelet regimen:   Anticoagulant:   Statin:   Beta blocker:      ==Pump==    LVEF:                              Regional Wall Motion Abnormaility?:  [ ]Yes   [ ] No, If Yes, Details  Diastolic function:  RV function:   Any change frim prior?: [ ] Yes   [ ] No, If Yes, Details:       ==Rhythm==    Current rhythm:  AM EKG Interpretation:   Anti-arrhythmic therapies:   TVP with settings:     carvedilol 25 milliGRAM(s) Oral every 12 hours  hydrALAZINE 75 milliGRAM(s) Oral three times a day  isosorbide   dinitrate Tablet (ISORDIL) 30 milliGRAM(s) Oral three times a day      ====================== NEUROLOGY=====================  hydrOXYzine hydrochloride 25 milliGRAM(s) Oral two times a day PRN Anxiety    ==================== RESPIRATORY======================  Mechanical Ventilation:        budesonide  80 MICROgram(s)/formoterol 4.5 MICROgram(s) Inhaler 2 Puff(s) Inhalation two times a day    ===================== RENAL =========================    23 @ 07:23 @ 07:00  --------------------------------------------------------  IN: 1536 mL / OUT: 2250 mL / NET: -714 mL    23 @ 07:23 @ 22:18  --------------------------------------------------------  IN: 874 mL / OUT: 500 mL / NET: 374 mL      Renal Replacement Therapy:  [ ] CRRT      [ ] IHD, Last Session:    Fluid removal:     [ ] Diuretic therapy, Regimen:       ==================== GASTROINTESTINAL===================    Last BM:   Indication for Stress Ulcer Prophylaxis, [ ] Yes    [ ] No   If Yes, Medication:     polyethylene glycol 3350 17 Gram(s) Oral two times a day  senna 2 Tablet(s) Oral at bedtime    ========================INFECTIOUS DISEASE================  T(C): 36.7 (23 @ 20:00), Max: 36.8 (23 @ 00:00)  WBC Count: 7.94 K/uL (23 @ 02:25)  WBC Count: 9.66 K/uL (23 @ 00:38)  WBC Count: 8.69 K/uL (23 @ 01:31)        Current Antibiotics with start date:       ===================HEMATOLOGIC/ONC ===================  Hemoglobin: 12.1 g/dL (23 @ 02:25)  Hemoglobin: 12.2 g/dL (23 @ 00:38)  Hemoglobin: 11.4 g/dL (23 @ 01:31)    Platelet Count - Automated: 152 K/uL (23 @ 02:25)  Platelet Count - Automated: 147 K/uL (23 @ 00:38)  Platelet Count - Automated: 114 K/uL (23 @ 01:31)    Chemical VTE Prophylaxis:  [ ] Lovenox    [ ] SQH   [ ]NA  Systemic Anticogaulation:  [ ] Yes    [ ] No,  If Yes, Medication:     aspirin  chewable 81 milliGRAM(s) Oral daily  heparin  Infusion 1300 Unit(s)/Hr (14 mL/Hr) IV Continuous <Continuous>    =======================    ENDOCRINE  =====================  POCT Blood Glucose.: 174 mg/dL (23 @ 22:02)  POCT Blood Glucose.: 144 mg/dL (23 @ 16:41)  POCT Blood Glucose.: 166 mg/dL (23 @ 12:33)  POCT Blood Glucose.: 130 mg/dL (23 @ 07:49)  POCT Blood Glucose.: 124 mg/dL (23 @ 22:33)        atorvastatin 80 milliGRAM(s) Oral at bedtime  insulin glargine Injectable (LANTUS) 12 Unit(s) SubCutaneous at bedtime  insulin lispro (ADMELOG) corrective regimen sliding scale   SubCutaneous at bedtime  insulin lispro (ADMELOG) corrective regimen sliding scale   SubCutaneous three times a day before meals  insulin lispro Injectable (ADMELOG) 9 Unit(s) SubCutaneous three times a day before meals      Patient requires continuous monitoring with bedside rhythm monitoring, pulse ox monitoring, and intermittent blood gas analysis. Care plan discussed with ICU care team. Patient remained critical and at risk for life threatening decompensation.  Patient seen, examined and plan discussed with CCU team during rounds.       I have personally provided ____ minutes of critical care time excluding time spent on separate procedures, in addition to initial critical care time provided by the CICU Attending, Dr. Lees/ Ladarius/ Marisa/ Saba/ Juan/ Consuelo.       DEVORAH VALENCIA  MRN-[id number]  Patient is a 58y old  Male who presents with a chief complaint of Cardiogenic shock (10 Dec 2023 20:20)    HPI:  pt seen and approx 1:30 pm  in [facility]    59yo M w/ hx HTN, CAD w/ 1 stent in , ICH () presenting with abn ekg. Patient presented to [facility] where he was found to have STEMI, recommended to get cath however patient did not want to get it there so it left and came here.  Patient initially had cough, congestion, fever, was placed on antibiotics on .  Started feeling nauseous and had a presyncopal event after which he presented to ED last night.  Had chest pain as well.  Chest pain is midsternal.  Not currently having chest pain.  Received 4 aspirin 30 min pta. (2023 15:11)      Hospital Course:    24 HOUR EVENTS:    REVIEW OF SYSTEMS:    CONSTITUTIONAL: No weakness, fevers or chills  EYES/ENT: No visual changes;  No vertigo or throat pain   NECK: No pain or stiffness  RESPIRATORY: No cough, wheezing, hemoptysis; No shortness of breath  CARDIOVASCULAR: No chest pain or palpitations  GASTROINTESTINAL: No abdominal or epigastric pain. No nausea, vomiting, or hematemesis; No diarrhea or constipation. No melena or hematochezia.  GENITOURINARY: No dysuria, frequency or hematuria  NEUROLOGICAL: No numbness or weakness  SKIN: No itching, rashes      ICU Vital Signs Last 24 Hrs  T(C): 36.7 (13 Dec 2023 20:00), Max: 36.8 (13 Dec 2023 00:00)  T(F): 98 (13 Dec 2023 20:00), Max: 98.2 (13 Dec 2023 00:00)  HR: 72 (13 Dec 2023 22:00) (72 - 87)  BP: 86/54 (13 Dec 2023 17:00) (86/54 - 119/63)  BP(mean): 66 (13 Dec 2023 17:00) (66 - 84)  ABP: --  ABP(mean): --  RR: 16 (13 Dec 2023 22:00) (14 - 23)  SpO2: 98% (13 Dec 2023 22:00) (95% - 100%)    O2 Parameters below as of 13 Dec 2023 20:00  Patient On (Oxygen Delivery Method): room air        POCT Blood Glucose.: 174 mg/dL (23 @ 22:02)  POCT Blood Glucose.: 144 mg/dL (23 @ 16:41)  POCT Blood Glucose.: 166 mg/dL (23 @ 12:33)  POCT Blood Glucose.: 130 mg/dL (23 @ 07:49)  POCT Blood Glucose.: 124 mg/dL (23 @ 22:33)    CAPILLARY BLOOD GLUCOSE      POCT Blood Glucose.: 174 mg/dL (13 Dec 2023 22:02)      PHYSICAL EXAM:  GENERAL: No acute distress, well-developed  HEAD:  Atraumatic, Normocephalic  EYES: EOMI, PERRLA, conjunctiva and sclera clear  NECK: Supple, no lymphadenopathy, no JVD  CHEST/LUNG: CTAB; No wheezes, rales, or rhonchi  HEART: Regular rate and rhythm. Normal S1/S2. No murmurs, rubs, or gallops  ABDOMEN: Soft, non-tender, non-distended; normal bowel sounds, no organomegaly  EXTREMITIES:  2+ peripheral pulses b/l, No clubbing, cyanosis, or edema  NEUROLOGY: A&O x 3, no focal deficits  SKIN: No rashes or lesions    ============================I/O===========================   I&O's Detail    12 Dec 2023 07:01  -  13 Dec 2023 07:00  --------------------------------------------------------  IN:    Heparin: 336 mL    Oral Fluid: 1200 mL  Total IN: 1536 mL    OUT:    Voided (mL): 2250 mL  Total OUT: 2250 mL    Total NET: -714 mL      13 Dec 2023 07:01  -  13 Dec 2023 22:18  --------------------------------------------------------  IN:    Heparin: 154 mL    Oral Fluid: 720 mL  Total IN: 874 mL    OUT:    Voided (mL): 500 mL  Total OUT: 500 mL    Total NET: 374 mL        ============================ LABS =========================                        12.1   7.94  )-----------( 152      ( 13 Dec 2023 02:25 )             36.8         135  |  106  |  34<H>  ----------------------------<  207<H>  4.3   |  19<L>  |  0.99    Ca    10.0      13 Dec 2023 02:25  Phos  3.8       Mg     1.8         TPro  6.9  /  Alb  3.9  /  TBili  0.3  /  DBili  x   /  AST  55<H>  /  ALT  162<H>  /  AlkPhos  68                  LIVER FUNCTIONS - ( 13 Dec 2023 02:25 )  Alb: 3.9 g/dL / Pro: 6.9 g/dL / ALK PHOS: 68 U/L / ALT: 162 U/L / AST: 55 U/L / GGT: x           PT/INR - ( 13 Dec 2023 02:25 )   PT: 10.9 sec;   INR: 1.04 ratio         PTT - ( 13 Dec 2023 02:25 )  PTT:67.0 sec    Lactate, Blood: 1.3 mmol/L (23 @ 02:25)    Urinalysis Basic - ( 13 Dec 2023 02:25 )    Color: x / Appearance: x / SG: x / pH: x  Gluc: 207 mg/dL / Ketone: x  / Bili: x / Urobili: x   Blood: x / Protein: x / Nitrite: x   Leuk Esterase: x / RBC: x / WBC x   Sq Epi: x / Non Sq Epi: x / Bacteria: x      ======================Micro/Rad/Cardio=================  Telemtry: Reviewed   EKG: Reviewed  CXR: Reviewed  Culture: Reviewed   Echo:   Cath: Cardiac Cath Lab - Adult:   [facility]  Department of Cardiology  [address] [place], [place] [id number] [phone]  Cath Lab Report -- Comprehensive Report  Patient: LD VALENCIA  Study date: 2017  Account number: [id number]  MR number: [id number]  : 1965  Gender: Male  Race: W  Case Physician(s):  Jairon Holguin M.D.  Referring Physician:  Luc Lynn M.D.  INDICATIONS: Unstable angina - CCS4.  HISTORY: The patient has a history of coronary artery disease. The patient  hashypertension and medication-treated dyslipidemia.  PROCEDURE:  --  Left heart catheterization with ventriculography.  --  Left coronary angiography.  --  Right coronary angiography.  TECHNIQUE: The risks and alternatives of the procedures and conscious  sedation were explained to the patient and informed consent was obtained.  Cardiac catheterization performed electively.  Local anesthetic given. Right radial artery access. A 6FR PRELUDE KIT was  inserted in the vessel. Left heart catheterization. Ventriculography was  performed. A 5FR FR4.0 EXPO catheter was utilized. Left coronary artery  angiography. The vessel was injected utilizing a 5FR FL3.5 EXPO catheter.  Right coronary artery angiography. The vessel was injected utilizing a 5FR  FR4.0 EXPO catheter. RADIATION EXPOSURE: 1.1 min.  CONTRAST GIVEN: Omnipaque 55 ml.  MEDICATIONS GIVEN: Midazolam, 1 mg, IV. Fentanyl, 25 mcg, IV. Verapamil  (Isoptin, Calan, Covera), 2.5 mg, IA. Heparin, 3000 units, IA.  VENTRICLES: Global left ventricular function was moderately depressed. EF  estimated was 40 %.  CORONARY VESSELS: The coronary circulation is right dominant.  LM:   --  LM: Normal.  LAD:   --  Proximal LAD: There was a 50 % stenosis.  CX:   --  Circumflex: Normal.  RCA:   --  Mid RCA: There was a 40 % stenosis.  --  Distal RCA: There was a 50 % stenosis.  COMPLICATIONS: There were no complications.  DIAGNOSTIC RECOMMENDATIONS: The patient should continue with the present  medications.  Prepared and signed by  Jairon Holguin M.D.  Signed 2017 12:20:13  HEMODYNAMIC TABLES  Pressures:  Baseline/ Room Air  Pressures:  - HR: 78  Pressures:  - Rhythm:  Pressures:  -- Aortic Pressure (S/D/M): --/--/99  Pressures:  -- Left Ventricle (s/edp): 157/39/--  Outputs:  Baseline/ Room Air  Outputs:  -- CALCULATIONS[age]  Outputs:  -- CALCULATIONS: Body Surface Area: 2.05  Outputs:  -- CALCULATIONS: Height in cm: 175.00  Outputs:  -- CALCULATIONS: Sex: Male  Outputs:  -- CALCULATIONS: Weight in k.40 (17 @ 21:55)    ======================================================  PAST MEDICAL & SURGICAL HISTORY:  HTN (hypertension)      CAD (coronary artery disease)  ; stent      Intracranial hemorrhage        Respiratory arrest        Myocardial infarction, unspecified MI type, unspecified artery      History of coronary artery stent placement        ====================ASSESSMENT ==============            Plan:  ====================CARDIOVASCULAR==================  Vfib arrest i/s/o ischemia  - Lido gtt off   - PO Amio load - total of 5g per EP complete   - Amio dc'd  for rising LFTs  - Keep K > 4, Mag > 2.2     Cardiogenic shock requiring IABP (- , -)  - Likely 2/2 NSTEMI and ADHF  -  LHC: pLAD 100 % in-stent restenosis & mRCA, 100 %. PCWP 30. IABP placed.  -  TTE: LV dilated. EF 32 %. Regional WMAs present, mod (grade 2) LV diastolic dysfunction  -  TTE: EF 22% and + LV thrombus  - IABP swapped  to RFA, continue 1:1 support  - Off Milrinone gtt @ 7:30 am   - [place] d/c'd due to elevated K levels  -  - reduced hydralazine 100 TID to 75 TID and ISD 40 TID to 30 TID for AL reduction  - c/w coreg 25 BID for GDMT  - UNOS status 2 as of     NSTEMI iso stent re-occlusion of pLAD and 100%  of RCA  - EKG on admission w/ LBBB  - DAPT: c/w ASA, Brilinta d/c'd per transplant w/u  - c/w lipitor 80  - cMR deferred given necessity of IABP  - CT sx not recommending CABG, undergoing AT eval    LV thrombus  - c/w heparin gtt w/ therapeutic PTT  carvedilol 25 milliGRAM(s) Oral every 12 hours  hydrALAZINE 75 milliGRAM(s) Oral three times a day  isosorbide   dinitrate Tablet (ISORDIL) 30 milliGRAM(s) Oral three times a day      ====================== NEUROLOGY=====================  Anxiety  - No Seroquel/antipsychotics since vfib arrest and prolonged QTC  - Psych Eval, recommended SSRI, but pt. refused   - Psych recommending atarax PRN  - Continue to monitor mental status    PT/Conditioning  - Continue band exercises while on bedrest s/t IABP    hydrOXYzine hydrochloride 25 milliGRAM(s) Oral two times a day PRN Anxiety    ==================== RESPIRATORY======================  Anxiety  - No Seroquel/antipsychotics since vfib arrest and prolonged QTC  - Psych Eval, recommended SSRI, but pt. refused   - Psych recommending atarax PRN  - Continue to monitor mental status    PT/Conditioning  - Continue band exercises while on bedrest s/t IABP      budesonide  80 MICROgram(s)/formoterol 4.5 MICROgram(s) Inhaler 2 Puff(s) Inhalation two times a day    ===================== RENAL =========================  Non-oliguric ARIC, resolved   - Baseline Cr: -  - Renal US: no evidence of renal artery stenosis  - Trend BMP, lytes daily, replace as needed  - Continue Strict I/Os, avoid nephrotoxins    Mild Hyponatremia/Hyperkalemia iso ARIC  - resolved  - Urea powder d/c'd per renal  - Trend daily  - Continue monitoring urine output, lytes, SCr/ BUN  - Replete lytes prn with goal K >4 and Mg >2    23 @ 07:01  -  23 @ 07:00  --------------------------------------------------------  IN: 1536 mL / OUT: 2250 mL / NET: -714 mL    23 @ 07:01  -  23 @ 22:18  --------------------------------------------------------  IN: 874 mL / OUT: 500 mL / NET: 374 mL      Renal Replacement Therapy:  [ ] CRRT      [ ] IHD, Last Session:    Fluid removal:     [ ] Diuretic therapy, Regimen:       ==================== GASTROINTESTINAL===================  Constipation/ileus, resolved  - regular diet  - bowel reg prn    Last BM:   Indication for Stress Ulcer Prophylaxis, [ ] Yes    [ ] No   If Yes, Medication:     polyethylene glycol 3350 17 Gram(s) Oral two times a day  senna 2 Tablet(s) Oral at bedtime    ========================INFECTIOUS DISEASE================  Positive blood culture, resolved  - Repeat BCx drawn  for leukocytosis to 12k - positive on , G+ rods in anaerobic bottle, suspect contaminant  - Repeat cultures x2 sent  per transplant ID recs - no growth to date  - Vancomycin by trough (-)  - Final microbial ID: Cutibacterium acnes, per ID this is likely to be a skin contaminant, vanc dc'd.   - Dental eval  to rule out infectious etiology that would have led to bacteremia, no significant findings on exam.     Enterococcus faecalis bacteremia, resolved  - BCx + for enterococcus faecalis x2, Staph epi x1 (likely contaminant)- pan sensitive   - Urine cx  + enterococcus faecalis  - BCx  no growth   - IABP site swapped to RFA   - s/p Vancomycin 1g q12h (-)  - CT A/P negative for infectious pathology    COVID, resolved  - Off airborne precautions     Pre-transplant ID w/u   - Trend ID recs for serologies   - Colonoscopy  - normal   - Chest CT  - improved LLL aeration  - s/p immunizations    T(C): 36.7 (23 @ 20:00), Max: 36.8 (23 @ 00:00)  WBC Count: 7.94 K/uL (23 @ 02:25)  WBC Count: 9.66 K/uL (23 @ 00:38)  WBC Count: 8.69 K/uL (23 @ 01:31)        Current Antibiotics with start date:       ===================HEMATOLOGIC/ONC ===================   H/H & plts stable  - Monitor H/H and plts  - VTE PPX: heparin gtt    Hemoglobin: 12.1 g/dL (23 @ 02:25)  Hemoglobin: 12.2 g/dL (23 @ 00:38)  Hemoglobin: 11.4 g/dL (23 @ 01:31)    Platelet Count - Automated: 152 K/uL (23 @ 02:25)  Platelet Count - Automated: 147 K/uL (23 @ 00:38)  Platelet Count - Automated: 114 K/uL (23 @ 01:31)    Chemical VTE Prophylaxis:  [ ] Lovenox    [ ] SQH   [ ]NA  Systemic Anticogaulation:  [ ] Yes    [ ] No,  If Yes, Medication:     aspirin  chewable 81 milliGRAM(s) Oral daily  heparin  Infusion 1300 Unit(s)/Hr (14 mL/Hr) IV Continuous <Continuous>    =======================    ENDOCRINE  =====================  Type 2 DM  - A1c 8.3, glucose controlled  - Continue lantus, premeal, low ISS  - continue FS    POCT Blood Glucose.: 174 mg/dL (23 @ 22:02)  POCT Blood Glucose.: 144 mg/dL (23 @ 16:41)  POCT Blood Glucose.: 166 mg/dL (23 @ 12:33)  POCT Blood Glucose.: 130 mg/dL (23 @ 07:49)  POCT Blood Glucose.: 124 mg/dL (23 @ 22:33)        atorvastatin 80 milliGRAM(s) Oral at bedtime  insulin glargine Injectable (LANTUS) 12 Unit(s) SubCutaneous at bedtime  insulin lispro (ADMELOG) corrective regimen sliding scale   SubCutaneous at bedtime  insulin lispro (ADMELOG) corrective regimen sliding scale   SubCutaneous three times a day before meals  insulin lispro Injectable (ADMELOG) 9 Unit(s) SubCutaneous three times a day before meals      Patient requires continuous monitoring with bedside rhythm monitoring, pulse ox monitoring, and intermittent blood gas analysis. Care plan discussed with ICU care team. Patient remained critical and at risk for life threatening decompensation.  Patient seen, examined and plan discussed with CCU team during rounds.       I have personally provided __30__ minutes of critical care time excluding time spent on separate procedures, in addition to initial critical care time provided by the CICU Attending, Dr. Her.       DEVORAH VALENCIA  MRN-[id number]  Patient is a 58y old  Male who presents with a chief complaint of Cardiogenic shock (10 Dec 2023 20:20)    HPI:  pt seen and approx 1:30 pm  in [facility]    59yo M w/ hx HTN, CAD w/ 1 stent in , ICH () presenting with abn ekg. Patient presented to [facility] where he was found to have STEMI, recommended to get cath however patient did not want to get it there so it left and came here.  Patient initially had cough, congestion, fever, was placed on antibiotics on .  Started feeling nauseous and had a presyncopal event after which he presented to ED last night.  Had chest pain as well.  Chest pain is midsternal.  Not currently having chest pain.  Received 4 aspirin 30 min pta. (2023 15:11)      Hospital Course:    24 HOUR EVENTS:    REVIEW OF SYSTEMS:    CONSTITUTIONAL: No weakness, fevers or chills  EYES/ENT: No visual changes;  No vertigo or throat pain   NECK: No pain or stiffness  RESPIRATORY: No cough, wheezing, hemoptysis; No shortness of breath  CARDIOVASCULAR: No chest pain or palpitations  GASTROINTESTINAL: No abdominal or epigastric pain. No nausea, vomiting, or hematemesis; No diarrhea or constipation. No melena or hematochezia.  GENITOURINARY: No dysuria, frequency or hematuria  NEUROLOGICAL: No numbness or weakness  SKIN: No itching, rashes      ICU Vital Signs Last 24 Hrs  T(C): 36.7 (13 Dec 2023 20:00), Max: 36.8 (13 Dec 2023 00:00)  T(F): 98 (13 Dec 2023 20:00), Max: 98.2 (13 Dec 2023 00:00)  HR: 72 (13 Dec 2023 22:00) (72 - 87)  BP: 86/54 (13 Dec 2023 17:00) (86/54 - 119/63)  BP(mean): 66 (13 Dec 2023 17:00) (66 - 84)  ABP: --  ABP(mean): --  RR: 16 (13 Dec 2023 22:00) (14 - 23)  SpO2: 98% (13 Dec 2023 22:00) (95% - 100%)    O2 Parameters below as of 13 Dec 2023 20:00  Patient On (Oxygen Delivery Method): room air        POCT Blood Glucose.: 174 mg/dL (23 @ 22:02)  POCT Blood Glucose.: 144 mg/dL (23 @ 16:41)  POCT Blood Glucose.: 166 mg/dL (23 @ 12:33)  POCT Blood Glucose.: 130 mg/dL (23 @ 07:49)  POCT Blood Glucose.: 124 mg/dL (23 @ 22:33)    CAPILLARY BLOOD GLUCOSE      POCT Blood Glucose.: 174 mg/dL (13 Dec 2023 22:02)      PHYSICAL EXAM:  GENERAL: No acute distress, well-developed  HEAD:  Atraumatic, Normocephalic  EYES: EOMI, PERRLA, conjunctiva and sclera clear  NECK: Supple, no lymphadenopathy, no JVD  CHEST/LUNG: CTAB; No wheezes, rales, or rhonchi  HEART: Regular rate and rhythm. Normal S1/S2. No murmurs, rubs, or gallops  ABDOMEN: Soft, non-tender, non-distended; normal bowel sounds, no organomegaly  EXTREMITIES:  2+ peripheral pulses b/l, No clubbing, cyanosis, or edema  NEUROLOGY: A&O x 3, no focal deficits  SKIN: No rashes or lesions    ============================I/O===========================   I&O's Detail    12 Dec 2023 07:01  -  13 Dec 2023 07:00  --------------------------------------------------------  IN:    Heparin: 336 mL    Oral Fluid: 1200 mL  Total IN: 1536 mL    OUT:    Voided (mL): 2250 mL  Total OUT: 2250 mL    Total NET: -714 mL      13 Dec 2023 07:01  -  13 Dec 2023 22:18  --------------------------------------------------------  IN:    Heparin: 154 mL    Oral Fluid: 720 mL  Total IN: 874 mL    OUT:    Voided (mL): 500 mL  Total OUT: 500 mL    Total NET: 374 mL        ============================ LABS =========================                        12.1   7.94  )-----------( 152      ( 13 Dec 2023 02:25 )             36.8         135  |  106  |  34<H>  ----------------------------<  207<H>  4.3   |  19<L>  |  0.99    Ca    10.0      13 Dec 2023 02:25  Phos  3.8       Mg     1.8         TPro  6.9  /  Alb  3.9  /  TBili  0.3  /  DBili  x   /  AST  55<H>  /  ALT  162<H>  /  AlkPhos  68                  LIVER FUNCTIONS - ( 13 Dec 2023 02:25 )  Alb: 3.9 g/dL / Pro: 6.9 g/dL / ALK PHOS: 68 U/L / ALT: 162 U/L / AST: 55 U/L / GGT: x           PT/INR - ( 13 Dec 2023 02:25 )   PT: 10.9 sec;   INR: 1.04 ratio         PTT - ( 13 Dec 2023 02:25 )  PTT:67.0 sec    Lactate, Blood: 1.3 mmol/L (23 @ 02:25)    Urinalysis Basic - ( 13 Dec 2023 02:25 )    Color: x / Appearance: x / SG: x / pH: x  Gluc: 207 mg/dL / Ketone: x  / Bili: x / Urobili: x   Blood: x / Protein: x / Nitrite: x   Leuk Esterase: x / RBC: x / WBC x   Sq Epi: x / Non Sq Epi: x / Bacteria: x      ======================Micro/Rad/Cardio=================  Telemtry: Reviewed   EKG: Reviewed  CXR: Reviewed  Culture: Reviewed   Echo:   Cath: Cardiac Cath Lab - Adult:   [facility]  Department of Cardiology  [address] [place], [place] [address] [phone]  Cath Lab Report -- Comprehensive Report  Patient: LD VALENCIA  Study date: 2017  Account number: [id number]  MR number: [id number]  : 1965  Gender: Male  Race: W  Case Physician(s):  Jairon Holguin M.D.  Referring Physician:  Luc Lynn M.D.  INDICATIONS: Unstable angina - CCS4.  HISTORY: The patient has a history of coronary artery disease. The patient  hashypertension and medication-treated dyslipidemia.  PROCEDURE:  --  Left heart catheterization with ventriculography.  --  Left coronary angiography.  --  Right coronary angiography.  TECHNIQUE: The risks and alternatives of the procedures and conscious  sedation were explained to the patient and informed consent was obtained.  Cardiac catheterization performed electively.  Local anesthetic given. Right radial artery access. A 6FR PRELUDE KIT was  inserted in the vessel. Left heart catheterization. Ventriculography was  performed. A 5FR FR4.0 EXPO catheter was utilized. Left coronary artery  angiography. The vessel was injected utilizing a 5FR FL3.5 EXPO catheter.  Right coronary artery angiography. The vessel was injected utilizing a 5FR  FR4.0 EXPO catheter. RADIATION EXPOSURE: 1.1 min.  CONTRAST GIVEN: Omnipaque 55 ml.  MEDICATIONS GIVEN: Midazolam, 1 mg, IV. Fentanyl, 25 mcg, IV. Verapamil  (Isoptin, Calan, Covera), 2.5 mg, IA. Heparin, 3000 units, IA.  VENTRICLES: Global left ventricular function was moderately depressed. EF  estimated was 40 %.  CORONARY VESSELS: The coronary circulation is right dominant.  LM:   --  LM: Normal.  LAD:   --  Proximal LAD: There was a 50 % stenosis.  CX:   --  Circumflex: Normal.  RCA:   --  Mid RCA: There was a 40 % stenosis.  --  Distal RCA: There was a 50 % stenosis.  COMPLICATIONS: There were no complications.  DIAGNOSTIC RECOMMENDATIONS: The patient should continue with the present  medications.  Prepared and signed by  Jairon Holguin M.D.  Signed 2017 12:20:13  HEMODYNAMIC TABLES  Pressures:  Baseline/ Room Air  Pressures:  - HR: 78  Pressures:  - Rhythm:  Pressures:  -- Aortic Pressure (S/D/M): --/--/99  Pressures:  -- Left Ventricle (s/edp): 157/39/--  Outputs:  Baseline/ Room Air  Outputs:  -- CALCULATIONS[age]  Outputs:  -- CALCULATIONS: Body Surface Area: 2.05  Outputs:  -- CALCULATIONS: Height in cm: 175.00  Outputs:  -- CALCULATIONS: Sex: Male  Outputs:  -- CALCULATIONS: Weight in k.40 (17 @ 21:55)    ======================================================  PAST MEDICAL & SURGICAL HISTORY:  HTN (hypertension)      CAD (coronary artery disease)  ; stent      Intracranial hemorrhage        Respiratory arrest        Myocardial infarction, unspecified MI type, unspecified artery      History of coronary artery stent placement        ====================ASSESSMENT ==============            Plan:  ====================CARDIOVASCULAR==================  Vfib arrest i/s/o ischemia  - Lido gtt off   - PO Amio load - total of 5g per EP complete   - Amio dc'd  for rising LFTs  - Keep K > 4, Mag > 2.2     Cardiogenic shock requiring IABP (- , -)  - Likely 2/2 NSTEMI and ADHF  -  LHC: pLAD 100 % in-stent restenosis & mRCA, 100 %. PCWP 30. IABP placed.  -  TTE: LV dilated. EF 32 %. Regional WMAs present, mod (grade 2) LV diastolic dysfunction  -  TTE: EF 22% and + LV thrombus  - IABP swapped  to RFA, continue 1:1 support  - Off Milrinone gtt @ 7:30 am   - [place] d/c'd due to elevated K levels  -  - reduced hydralazine 100 TID to 75 TID and ISD 40 TID to 30 TID for AL reduction  - c/w coreg 25 BID for GDMT  - UNOS status 2 as of     NSTEMI iso stent re-occlusion of pLAD and 100%  of RCA  - EKG on admission w/ LBBB  - DAPT: c/w ASA, Brilinta d/c'd per transplant w/u  - c/w lipitor 80  - cMR deferred given necessity of IABP  - CT sx not recommending CABG, undergoing AT eval    LV thrombus  - c/w heparin gtt w/ therapeutic PTT  carvedilol 25 milliGRAM(s) Oral every 12 hours  hydrALAZINE 75 milliGRAM(s) Oral three times a day  isosorbide   dinitrate Tablet (ISORDIL) 30 milliGRAM(s) Oral three times a day      ====================== NEUROLOGY=====================  Anxiety  - No Seroquel/antipsychotics since vfib arrest and prolonged QTC  - Psych Eval, recommended SSRI, but pt. refused   - Psych recommending atarax PRN  - Continue to monitor mental status    PT/Conditioning  - Continue band exercises while on bedrest s/t IABP    hydrOXYzine hydrochloride 25 milliGRAM(s) Oral two times a day PRN Anxiety    ==================== RESPIRATORY======================  Anxiety  - No Seroquel/antipsychotics since vfib arrest and prolonged QTC  - Psych Eval, recommended SSRI, but pt. refused   - Psych recommending atarax PRN  - Continue to monitor mental status    PT/Conditioning  - Continue band exercises while on bedrest s/t IABP      budesonide  80 MICROgram(s)/formoterol 4.5 MICROgram(s) Inhaler 2 Puff(s) Inhalation two times a day    ===================== RENAL =========================  Non-oliguric ARIC, resolved   - Baseline Cr: -  - Renal US: no evidence of renal artery stenosis  - Trend BMP, lytes daily, replace as needed  - Continue Strict I/Os, avoid nephrotoxins    Mild Hyponatremia/Hyperkalemia iso ARIC  - resolved  - Urea powder d/c'd per renal  - Trend daily  - Continue monitoring urine output, lytes, SCr/ BUN  - Replete lytes prn with goal K >4 and Mg >2    23 @ 07:01  -  23 @ 07:00  --------------------------------------------------------  IN: 1536 mL / OUT: 2250 mL / NET: -714 mL    23 @ 07:01  -  23 @ 22:18  --------------------------------------------------------  IN: 874 mL / OUT: 500 mL / NET: 374 mL      Renal Replacement Therapy:  [ ] CRRT      [ ] IHD, Last Session:    Fluid removal:     [ ] Diuretic therapy, Regimen:       ==================== GASTROINTESTINAL===================  Constipation/ileus, resolved  - regular diet  - bowel reg prn    Last BM:   Indication for Stress Ulcer Prophylaxis, [ ] Yes    [ ] No   If Yes, Medication:     polyethylene glycol 3350 17 Gram(s) Oral two times a day  senna 2 Tablet(s) Oral at bedtime    ========================INFECTIOUS DISEASE================  Positive blood culture, resolved  - Repeat BCx drawn  for leukocytosis to 12k - positive on , G+ rods in anaerobic bottle, suspect contaminant  - Repeat cultures x2 sent  per transplant ID recs - no growth to date  - Vancomycin by trough (-)  - Final microbial ID: Cutibacterium acnes, per ID this is likely to be a skin contaminant, vanc dc'd.   - Dental eval  to rule out infectious etiology that would have led to bacteremia, no significant findings on exam.     Enterococcus faecalis bacteremia, resolved  - BCx + for enterococcus faecalis x2, Staph epi x1 (likely contaminant)- pan sensitive   - Urine cx  + enterococcus faecalis  - BCx  no growth   - IABP site swapped to RFA   - s/p Vancomycin 1g q12h (-)  - CT A/P negative for infectious pathology    COVID, resolved  - Off airborne precautions     Pre-transplant ID w/u   - Trend ID recs for serologies   - Colonoscopy  - normal   - Chest CT  - improved LLL aeration  - s/p immunizations    T(C): 36.7 (23 @ 20:00), Max: 36.8 (23 @ 00:00)  WBC Count: 7.94 K/uL (23 @ 02:25)  WBC Count: 9.66 K/uL (23 @ 00:38)  WBC Count: 8.69 K/uL (23 @ 01:31)        Current Antibiotics with start date:       ===================HEMATOLOGIC/ONC ===================   H/H & plts stable  - Monitor H/H and plts  - VTE PPX: heparin gtt    Hemoglobin: 12.1 g/dL (23 @ 02:25)  Hemoglobin: 12.2 g/dL (23 @ 00:38)  Hemoglobin: 11.4 g/dL (23 @ 01:31)    Platelet Count - Automated: 152 K/uL (23 @ 02:25)  Platelet Count - Automated: 147 K/uL (23 @ 00:38)  Platelet Count - Automated: 114 K/uL (23 @ 01:31)    Chemical VTE Prophylaxis:  [ ] Lovenox    [ ] SQH   [ ]NA  Systemic Anticogaulation:  [ ] Yes    [ ] No,  If Yes, Medication:     aspirin  chewable 81 milliGRAM(s) Oral daily  heparin  Infusion 1300 Unit(s)/Hr (14 mL/Hr) IV Continuous <Continuous>    =======================    ENDOCRINE  =====================  Type 2 DM  - A1c 8.3, glucose controlled  - Continue lantus, premeal, low ISS  - continue FS    POCT Blood Glucose.: 174 mg/dL (23 @ 22:02)  POCT Blood Glucose.: 144 mg/dL (23 @ 16:41)  POCT Blood Glucose.: 166 mg/dL (23 @ 12:33)  POCT Blood Glucose.: 130 mg/dL (23 @ 07:49)  POCT Blood Glucose.: 124 mg/dL (23 @ 22:33)        atorvastatin 80 milliGRAM(s) Oral at bedtime  insulin glargine Injectable (LANTUS) 12 Unit(s) SubCutaneous at bedtime  insulin lispro (ADMELOG) corrective regimen sliding scale   SubCutaneous at bedtime  insulin lispro (ADMELOG) corrective regimen sliding scale   SubCutaneous three times a day before meals  insulin lispro Injectable (ADMELOG) 9 Unit(s) SubCutaneous three times a day before meals      Patient requires continuous monitoring with bedside rhythm monitoring, pulse ox monitoring, and intermittent blood gas analysis. Care plan discussed with ICU care team. Patient remained critical and at risk for life threatening decompensation.  Patient seen, examined and plan discussed with CCU team during rounds.       I have personally provided __30__ minutes of critical care time excluding time spent on separate procedures, in addition to initial critical care time provided by the CICU Attending, Dr. Her.

## 2023-12-13 NOTE — PROCEDURE NOTE - NSPOSTCAREGUIDE_GEN_A_CORE
Care for catheter as per unit/ICU protocols
Verbal/written post procedure instructions were given to patient/caregiver/Instructed patient/caregiver to follow-up with primary care physician/Instructed patient/caregiver regarding signs and symptoms of infection/Keep the cast/splint/dressing clean and dry/Care for catheter as per unit/ICU protocols
Care for catheter as per unit/ICU protocols
Verbal/written post procedure instructions were given to patient/caregiver/Keep the cast/splint/dressing clean and dry/Care for catheter as per unit/ICU protocols
Care for catheter as per unit/ICU protocols

## 2023-12-13 NOTE — PROCEDURE NOTE - NSANESTHESIA_GEN_A_CORE
1% lidocaine
no anesthesia administered
1% lidocaine
no anesthesia administered
1% lidocaine
1% lidocaine

## 2023-12-13 NOTE — PROCEDURE NOTE - NSICDXPROCEDURE_GEN_ALL_CORE_FT
PROCEDURES:  Removal, intra-aortic balloon pump, percutaneous 07-Nov-2023 11:57:36  Mesh, Russ  
PROCEDURES:  Percutaneous catheterization of artery 14-Dec-2023 08:42:49  Petra Ramos

## 2023-12-13 NOTE — PROGRESS NOTE ADULT - SUBJECTIVE AND OBJECTIVE BOX
PATIENT:  DEVORAH VALENCIA    [id number]    58M with HFrEF (LVIDd 6.4 cm, LVEF 32%), CAD s/p PCI (2008), HTN, DMT2 (A1c 8.3%) and CVA s/p TPA (2018), recently treated for PNA who initially presented to [facility] via EMS after syncope reportedly requiring defibrilation. Treated for ACS but left AMA to come to [facility]. On arrival ECG with ST depression in lateral leads. Intubated 11/1 for respiratory failure. Found to have COVID. L/RHC 11/1revealing  of LAD and RCA, elevated filling pressures and CI of 1.5 prompting placement of IABP. Extubated 11/3. IABP was weaned to off 11/7, then the following day on 11/8, developed PMVT cardiac arrest with prompt CPR and defibrillation, started on IV Lidocaine and Amio. IABP ultimately replaced on 11/9 for worsening hypotension. TTE 11/11 revealing LV thrombus. IABP replaced 11/20. Currently UNOS status 2 awaiting transplant.     INTERVAL HISTORY/OVERNIGHT EVENTS:  The patient was seen and examined at bedside. No acute events overnight. Refused AM CXR, agreeable to having XR done during daytime. Stable on 1:1 IABP.     INTERVAL HISTORY/OVERNIGHT EVENTS:  The patient was seen and examined at bedside.     REVIEW OF SYSTEMS:  all other ROS negative except as per HPI.     MEDICATIONS:  MEDICATIONS  (STANDING):  aspirin  chewable 81 milliGRAM(s) Oral daily  atorvastatin 80 milliGRAM(s) Oral at bedtime  budesonide  80 MICROgram(s)/formoterol 4.5 MICROgram(s) Inhaler 2 Puff(s) Inhalation two times a day  carvedilol 25 milliGRAM(s) Oral every 12 hours  chlorhexidine 2% Cloths 1 Application(s) Topical daily  clobetasol 0.05% Ointment 1 Application(s) Topical two times a day  heparin  Infusion 1300 Unit(s)/Hr (14 mL/Hr) IV Continuous <Continuous>  hydrALAZINE 75 milliGRAM(s) Oral three times a day  insulin glargine Injectable (LANTUS) 12 Unit(s) SubCutaneous at bedtime  insulin lispro (ADMELOG) corrective regimen sliding scale   SubCutaneous at bedtime  insulin lispro (ADMELOG) corrective regimen sliding scale   SubCutaneous three times a day before meals  insulin lispro Injectable (ADMELOG) 9 Unit(s) SubCutaneous three times a day before meals  isosorbide   dinitrate Tablet (ISORDIL) 30 milliGRAM(s) Oral three times a day  polyethylene glycol 3350 17 Gram(s) Oral two times a day  senna 2 Tablet(s) Oral at bedtime    MEDICATIONS  (PRN):  hydrOXYzine hydrochloride 25 milliGRAM(s) Oral two times a day PRN Anxiety    ALLERGIES:  penicillins (Unknown)      OBJECTIVE:  ICU Vital Signs Last 24 Hrs  T(C): 36.4 (13 Dec 2023 08:00), Max: 36.8 (12 Dec 2023 17:00)  T(F): 97.6 (13 Dec 2023 08:00), Max: 98.2 (12 Dec 2023 17:00)  HR: 74 (13 Dec 2023 08:00) (72 - 104)  BP: 119/63 (13 Dec 2023 07:45) (119/63 - 119/63)  BP(mean): 84 (13 Dec 2023 07:45) (84 - 84)  ABP: --  ABP(mean): --  RR: 22 (13 Dec 2023 08:00) (14 - 27)  SpO2: 96% (13 Dec 2023 07:45) (96% - 98%)    O2 Parameters below as of 13 Dec 2023 07:45  Patient On (Oxygen Delivery Method): room air      CAPILLARY BLOOD GLUCOSE    POCT Blood Glucose.: 130 mg/dL (13 Dec 2023 07:49)  POCT Blood Glucose.: 124 mg/dL (12 Dec 2023 22:33)  POCT Blood Glucose.: 162 mg/dL (12 Dec 2023 17:05)  POCT Blood Glucose.: 246 mg/dL (12 Dec 2023 12:25)  POCT Blood Glucose.: 159 mg/dL (12 Dec 2023 08:40)      I&O's Summary    12 Dec 2023 07:01  -  13 Dec 2023 07:00  --------------------------------------------------------  IN: 1536 mL / OUT: 2250 mL / NET: -714 mL    13 Dec 2023 07:01  -  13 Dec 2023 08:25  --------------------------------------------------------  IN: 14 mL / OUT: 0 mL / NET: 14 mL      PHYSICAL EXAMINATION:  General: WN/WD NAD  HEENT: PERRLA, EOMI, moist mucous membranes  Neurology: A&Ox3, nonfocal, MOISE x 4  Respiratory: CTA B/L, normal respiratory effort, no wheezes, crackles, rales  CV: RRR, S1S2, no murmurs, rubs or gallops  Abdominal: Soft, NT, ND +BS, Last BM  Extremities: R groin IABP site well appearing, no swelling or tenderness to palpation. No lower extremity edema, + peripheral pulses    LABS:               12.1   7.94  )-----------( 152      ( 13 Dec 2023 02:25 )             36.8     12-13    135  |  106  |  34<H>  ----------------------------<  207<H>  4.3   |  19<L>  |  0.99    Ca    10.0      13 Dec 2023 02:25  Phos  3.8     12-13  Mg     1.8     12-13    TPro  6.9  /  Alb  3.9  /  TBili  0.3  /  DBili  x   /  AST  55<H>  /  ALT  162<H>  /  AlkPhos  68  12-13    LIVER FUNCTIONS - ( 13 Dec 2023 02:25 )  Alb: 3.9 g/dL / Pro: 6.9 g/dL / ALK PHOS: 68 U/L / ALT: 162 U/L / AST: 55 U/L / GGT: x           PT/INR - ( 13 Dec 2023 02:25 )   PT: 10.9 sec;   INR: 1.04 ratio         PTT - ( 13 Dec 2023 02:25 )  PTT:67.0 sec        Urinalysis Basic - ( 13 Dec 2023 02:25 )    Color: x / Appearance: x / SG: x / pH: x  Gluc: 207 mg/dL / Ketone: x  / Bili: x / Urobili: x   Blood: x / Protein: x / Nitrite: x   Leuk Esterase: x / RBC: x / WBC x   Sq Epi: x / Non Sq Epi: x / Bacteria: x      IMAGING: PATIENT:  DEVORAH VALENCIA    [id number]    58M with HFrEF (LVIDd 6.4 cm, LVEF 32%), CAD s/p PCI (2008), HTN, DMT2 (A1c 8.3%) and CVA s/p TPA (2018), recently treated for PNA who initially presented to [facility] via EMS after syncope reportedly requiring defibrilation. Treated for ACS but left AMA to come to [facility]. On arrival ECG with ST depression in lateral leads. Intubated 11/1 for respiratory failure. Found to have COVID. L/RHC 11/1revealing  of LAD and RCA, elevated filling pressures and CI of 1.5 prompting placement of IABP. Extubated 11/3. IABP was weaned to off 11/7, then the following day on 11/8, developed PMVT cardiac arrest with prompt CPR and defibrillation, started on IV Lidocaine and Amio. IABP ultimately replaced on 11/9 for worsening hypotension. TTE 11/11 revealing LV thrombus. IABP replaced 11/20. Currently UNOS status 2 awaiting transplant.     INTERVAL HISTORY/OVERNIGHT EVENTS:  The patient was seen and examined at bedside. No acute events overnight. Refused AM CXR, agreeable to having XR done during daytime. Stable on 1:1 IABP.     INTERVAL HISTORY/OVERNIGHT EVENTS:  The patient was seen and examined at bedside.     REVIEW OF SYSTEMS:  all other ROS negative except as per HPI.     MEDICATIONS:  MEDICATIONS  (STANDING):  aspirin  chewable 81 milliGRAM(s) Oral daily  atorvastatin 80 milliGRAM(s) Oral at bedtime  budesonide  80 MICROgram(s)/formoterol 4.5 MICROgram(s) Inhaler 2 Puff(s) Inhalation two times a day  carvedilol 25 milliGRAM(s) Oral every 12 hours  chlorhexidine 2% Cloths 1 Application(s) Topical daily  clobetasol 0.05% Ointment 1 Application(s) Topical two times a day  heparin  Infusion 1300 Unit(s)/Hr (14 mL/Hr) IV Continuous <Continuous>  hydrALAZINE 75 milliGRAM(s) Oral three times a day  insulin glargine Injectable (LANTUS) 12 Unit(s) SubCutaneous at bedtime  insulin lispro (ADMELOG) corrective regimen sliding scale   SubCutaneous at bedtime  insulin lispro (ADMELOG) corrective regimen sliding scale   SubCutaneous three times a day before meals  insulin lispro Injectable (ADMELOG) 9 Unit(s) SubCutaneous three times a day before meals  isosorbide   dinitrate Tablet (ISORDIL) 30 milliGRAM(s) Oral three times a day  polyethylene glycol 3350 17 Gram(s) Oral two times a day  senna 2 Tablet(s) Oral at bedtime    MEDICATIONS  (PRN):  hydrOXYzine hydrochloride 25 milliGRAM(s) Oral two times a day PRN Anxiety    ALLERGIES:  penicillins (Unknown)      OBJECTIVE:  ICU Vital Signs Last 24 Hrs  T(C): 36.4 (13 Dec 2023 08:00), Max: 36.8 (12 Dec 2023 17:00)  T(F): 97.6 (13 Dec 2023 08:00), Max: 98.2 (12 Dec 2023 17:00)  HR: 74 (13 Dec 2023 08:00) (72 - 104)  BP: 119/63 (13 Dec 2023 07:45) (119/63 - 119/63)  BP(mean): 84 (13 Dec 2023 07:45) (84 - 84)  ABP: --  ABP(mean): --  RR: 22 (13 Dec 2023 08:00) (14 - 27)  SpO2: 96% (13 Dec 2023 07:45) (96% - 98%)    O2 Parameters below as of 13 Dec 2023 07:45  Patient On (Oxygen Delivery Method): room air      CAPILLARY BLOOD GLUCOSE    POCT Blood Glucose.: 130 mg/dL (13 Dec 2023 07:49)  POCT Blood Glucose.: 124 mg/dL (12 Dec 2023 22:33)  POCT Blood Glucose.: 162 mg/dL (12 Dec 2023 17:05)  POCT Blood Glucose.: 246 mg/dL (12 Dec 2023 12:25)  POCT Blood Glucose.: 159 mg/dL (12 Dec 2023 08:40)      I&O's Summary    12 Dec 2023 07:01  -  13 Dec 2023 07:00  --------------------------------------------------------  IN: 1536 mL / OUT: 2250 mL / NET: -714 mL    13 Dec 2023 07:01  -  13 Dec 2023 08:25  --------------------------------------------------------  IN: 14 mL / OUT: 0 mL / NET: 14 mL      PHYSICAL EXAMINATION:  General: WN/WD NAD  HEENT: PERRLA, EOMI, moist mucous membranes  Neurology: A&Ox3, nonfocal, MOISE x 4  Respiratory: CTA B/L, normal respiratory effort, no wheezes, crackles, rales  CV: RRR, S1S2, no murmurs, rubs or gallops  Abdominal: Soft, NT, ND +BS, Last BM  Extremities: R groin IABP site well appearing, no swelling or tenderness to palpation. No lower extremity edema, + peripheral pulses    LABS:               12.1   7.94  )-----------( 152      ( 13 Dec 2023 02:25 )             36.8     12-13    135  |  106  |  34<H>  ----------------------------<  207<H>  4.3   |  19<L>  |  0.99    Ca    10.0      13 Dec 2023 02:25  Phos  3.8     12-13  Mg     1.8     12-13    TPro  6.9  /  Alb  3.9  /  TBili  0.3  /  DBili  x   /  AST  55<H>  /  ALT  162<H>  /  AlkPhos  68  12-13    LIVER FUNCTIONS - ( 13 Dec 2023 02:25 )  Alb: 3.9 g/dL / Pro: 6.9 g/dL / ALK PHOS: 68 U/L / ALT: 162 U/L / AST: 55 U/L / GGT: x           PT/INR - ( 13 Dec 2023 02:25 )   PT: 10.9 sec;   INR: 1.04 ratio         PTT - ( 13 Dec 2023 02:25 )  PTT:67.0 sec        Urinalysis Basic - ( 13 Dec 2023 02:25 )    Color: x / Appearance: x / SG: x / pH: x  Gluc: 207 mg/dL / Ketone: x  / Bili: x / Urobili: x   Blood: x / Protein: x / Nitrite: x   Leuk Esterase: x / RBC: x / WBC x   Sq Epi: x / Non Sq Epi: x / Bacteria: x      IMAGING: PATIENT:  DEVORAH VALENCIA    [id number]    58M with HFrEF (LVIDd 6.4 cm, LVEF 32%), CAD s/p PCI (2008), HTN, DMT2 (A1c 8.3%) and CVA s/p TPA (2018), recently treated for PNA who initially presented to [facility] via EMS after syncope reportedly requiring defibrilation. Treated for ACS but left AMA to come to [facility]. On arrival ECG with ST depression in lateral leads. Intubated 11/1 for respiratory failure. Found to have COVID. L/RHC 11/1revealing  of LAD and RCA, elevated filling pressures and CI of 1.5 prompting placement of IABP. Extubated 11/3. IABP was weaned to off 11/7, then the following day on 11/8, developed PMVT cardiac arrest with prompt CPR and defibrillation, started on IV Lidocaine and Amio. IABP ultimately replaced on 11/9 for worsening hypotension. TTE 11/11 revealing LV thrombus. IABP replaced 11/20. Currently UNOS status 2 awaiting transplant.     INTERVAL HISTORY/OVERNIGHT EVENTS:  The patient was seen and examined at bedside. No acute events overnight. Refused AM CXR, agreeable to having XR done during daytime. Stable on 1:1 IABP.     INTERVAL HISTORY/OVERNIGHT EVENTS:  The patient was seen and examined at bedside.     REVIEW OF SYSTEMS:  all other ROS negative except as per HPI.     MEDICATIONS:  MEDICATIONS  (STANDING):  aspirin  chewable 81 milliGRAM(s) Oral daily  atorvastatin 80 milliGRAM(s) Oral at bedtime  budesonide  80 MICROgram(s)/formoterol 4.5 MICROgram(s) Inhaler 2 Puff(s) Inhalation two times a day  carvedilol 25 milliGRAM(s) Oral every 12 hours  chlorhexidine 2% Cloths 1 Application(s) Topical daily  clobetasol 0.05% Ointment 1 Application(s) Topical two times a day  heparin  Infusion 1300 Unit(s)/Hr (14 mL/Hr) IV Continuous <Continuous>  hydrALAZINE 75 milliGRAM(s) Oral three times a day  insulin glargine Injectable (LANTUS) 12 Unit(s) SubCutaneous at bedtime  insulin lispro (ADMELOG) corrective regimen sliding scale   SubCutaneous at bedtime  insulin lispro (ADMELOG) corrective regimen sliding scale   SubCutaneous three times a day before meals  insulin lispro Injectable (ADMELOG) 9 Unit(s) SubCutaneous three times a day before meals  isosorbide   dinitrate Tablet (ISORDIL) 30 milliGRAM(s) Oral three times a day  polyethylene glycol 3350 17 Gram(s) Oral two times a day  senna 2 Tablet(s) Oral at bedtime    MEDICATIONS  (PRN):  hydrOXYzine hydrochloride 25 milliGRAM(s) Oral two times a day PRN Anxiety    ALLERGIES:  penicillins (Unknown)      OBJECTIVE:  ICU Vital Signs Last 24 Hrs  T(C): 36.4 (13 Dec 2023 08:00), Max: 36.8 (12 Dec 2023 17:00)  T(F): 97.6 (13 Dec 2023 08:00), Max: 98.2 (12 Dec 2023 17:00)  HR: 74 (13 Dec 2023 08:00) (72 - 104)  BP: 119/63 (13 Dec 2023 07:45) (119/63 - 119/63)  BP(mean): 84 (13 Dec 2023 07:45) (84 - 84)  ABP: --  ABP(mean): --  RR: 22 (13 Dec 2023 08:00) (14 - 27)  SpO2: 96% (13 Dec 2023 07:45) (96% - 98%)    O2 Parameters below as of 13 Dec 2023 07:45  Patient On (Oxygen Delivery Method): room air      CAPILLARY BLOOD GLUCOSE    POCT Blood Glucose.: 130 mg/dL (13 Dec 2023 07:49)  POCT Blood Glucose.: 124 mg/dL (12 Dec 2023 22:33)  POCT Blood Glucose.: 162 mg/dL (12 Dec 2023 17:05)  POCT Blood Glucose.: 246 mg/dL (12 Dec 2023 12:25)  POCT Blood Glucose.: 159 mg/dL (12 Dec 2023 08:40)      I&O's Summary    12 Dec 2023 07:01  -  13 Dec 2023 07:00  --------------------------------------------------------  IN: 1536 mL / OUT: 2250 mL / NET: -714 mL    13 Dec 2023 07:01  -  13 Dec 2023 08:25  --------------------------------------------------------  IN: 14 mL / OUT: 0 mL / NET: 14 mL      PHYSICAL EXAMINATION:  General: WN/WD NAD  HEENT: PERRLA, EOMI, moist mucous membranes  Neurology: A&Ox3, nonfocal, MOISE x 4  Respiratory: CTA B/L, normal respiratory effort, no wheezes, crackles, rales  CV: RRR, S1S2, no murmurs, rubs or gallops  Abdominal: Soft, NT, ND +BS, Last BM  Extremities: R groin IABP site well appearing, no swelling or tenderness to palpation. No lower extremity edema, + peripheral pulses  Skin: bilateral upper extremity rash noted, improving    LABS:               12.1   7.94  )-----------( 152      ( 13 Dec 2023 02:25 )             36.8     12-13    135  |  106  |  34<H>  ----------------------------<  207<H>  4.3   |  19<L>  |  0.99    Ca    10.0      13 Dec 2023 02:25  Phos  3.8     12-13  Mg     1.8     12-13    TPro  6.9  /  Alb  3.9  /  TBili  0.3  /  DBili  x   /  AST  55<H>  /  ALT  162<H>  /  AlkPhos  68  12-13    LIVER FUNCTIONS - ( 13 Dec 2023 02:25 )  Alb: 3.9 g/dL / Pro: 6.9 g/dL / ALK PHOS: 68 U/L / ALT: 162 U/L / AST: 55 U/L / GGT: x           PT/INR - ( 13 Dec 2023 02:25 )   PT: 10.9 sec;   INR: 1.04 ratio         PTT - ( 13 Dec 2023 02:25 )  PTT:67.0 sec      Urinalysis Basic - ( 13 Dec 2023 02:25 )    Color: x / Appearance: x / SG: x / pH: x  Gluc: 207 mg/dL / Ketone: x  / Bili: x / Urobili: x   Blood: x / Protein: x / Nitrite: x   Leuk Esterase: x / RBC: x / WBC x   Sq Epi: x / Non Sq Epi: x / Bacteria: x      IMAGING: IABP in good position on CXR PATIENT:  DEVORAH VALENCIA    [id number]    58M with HFrEF (LVIDd 6.4 cm, LVEF 32%), CAD s/p PCI (2008), HTN, DMT2 (A1c 8.3%) and CVA s/p TPA (2018), recently treated for PNA who initially presented to [facility] via EMS after syncope reportedly requiring defibrilation. Treated for ACS but left AMA to come to [facility]. On arrival ECG with ST depression in lateral leads. Intubated 11/1 for respiratory failure. Found to have COVID. L/RHC 11/1revealing  of LAD and RCA, elevated filling pressures and CI of 1.5 prompting placement of IABP. Extubated 11/3. IABP was weaned to off 11/7, then the following day on 11/8, developed PMVT cardiac arrest with prompt CPR and defibrillation, started on IV Lidocaine and Amio. IABP ultimately replaced on 11/9 for worsening hypotension. TTE 11/11 revealing LV thrombus. IABP replaced 11/20. Currently UNOS status 2 awaiting transplant.     INTERVAL HISTORY/OVERNIGHT EVENTS:  The patient was seen and examined at bedside. No acute events overnight. Refused AM CXR, agreeable to having XR done during daytime. Stable on 1:1 IABP.     INTERVAL HISTORY/OVERNIGHT EVENTS:  The patient was seen and examined at bedside.     REVIEW OF SYSTEMS:  all other ROS negative except as per HPI.     MEDICATIONS:  MEDICATIONS  (STANDING):  aspirin  chewable 81 milliGRAM(s) Oral daily  atorvastatin 80 milliGRAM(s) Oral at bedtime  budesonide  80 MICROgram(s)/formoterol 4.5 MICROgram(s) Inhaler 2 Puff(s) Inhalation two times a day  carvedilol 25 milliGRAM(s) Oral every 12 hours  chlorhexidine 2% Cloths 1 Application(s) Topical daily  clobetasol 0.05% Ointment 1 Application(s) Topical two times a day  heparin  Infusion 1300 Unit(s)/Hr (14 mL/Hr) IV Continuous <Continuous>  hydrALAZINE 75 milliGRAM(s) Oral three times a day  insulin glargine Injectable (LANTUS) 12 Unit(s) SubCutaneous at bedtime  insulin lispro (ADMELOG) corrective regimen sliding scale   SubCutaneous at bedtime  insulin lispro (ADMELOG) corrective regimen sliding scale   SubCutaneous three times a day before meals  insulin lispro Injectable (ADMELOG) 9 Unit(s) SubCutaneous three times a day before meals  isosorbide   dinitrate Tablet (ISORDIL) 30 milliGRAM(s) Oral three times a day  polyethylene glycol 3350 17 Gram(s) Oral two times a day  senna 2 Tablet(s) Oral at bedtime    MEDICATIONS  (PRN):  hydrOXYzine hydrochloride 25 milliGRAM(s) Oral two times a day PRN Anxiety    ALLERGIES:  penicillins (Unknown)      OBJECTIVE:  ICU Vital Signs Last 24 Hrs  T(C): 36.4 (13 Dec 2023 08:00), Max: 36.8 (12 Dec 2023 17:00)  T(F): 97.6 (13 Dec 2023 08:00), Max: 98.2 (12 Dec 2023 17:00)  HR: 74 (13 Dec 2023 08:00) (72 - 104)  BP: 119/63 (13 Dec 2023 07:45) (119/63 - 119/63)  BP(mean): 84 (13 Dec 2023 07:45) (84 - 84)  ABP: --  ABP(mean): --  RR: 22 (13 Dec 2023 08:00) (14 - 27)  SpO2: 96% (13 Dec 2023 07:45) (96% - 98%)    O2 Parameters below as of 13 Dec 2023 07:45  Patient On (Oxygen Delivery Method): room air      CAPILLARY BLOOD GLUCOSE    POCT Blood Glucose.: 130 mg/dL (13 Dec 2023 07:49)  POCT Blood Glucose.: 124 mg/dL (12 Dec 2023 22:33)  POCT Blood Glucose.: 162 mg/dL (12 Dec 2023 17:05)  POCT Blood Glucose.: 246 mg/dL (12 Dec 2023 12:25)  POCT Blood Glucose.: 159 mg/dL (12 Dec 2023 08:40)      I&O's Summary    12 Dec 2023 07:01  -  13 Dec 2023 07:00  --------------------------------------------------------  IN: 1536 mL / OUT: 2250 mL / NET: -714 mL    13 Dec 2023 07:01  -  13 Dec 2023 08:25  --------------------------------------------------------  IN: 14 mL / OUT: 0 mL / NET: 14 mL      PHYSICAL EXAMINATION:  General: WN/WD NAD  HEENT: PERRLA, EOMI, moist mucous membranes  Neurology: A&Ox3, nonfocal, MOISE x 4  Respiratory: CTA B/L, normal respiratory effort, no wheezes, crackles, rales  CV: RRR, S1S2, no murmurs, rubs or gallops  Abdominal: Soft, NT, ND +BS, Last BM  Extremities: R groin IABP site well appearing, no swelling or tenderness to palpation. No lower extremity edema, + peripheral pulses  Skin: bilateral upper extremity rash noted, improving    LABS:               12.1   7.94  )-----------( 152      ( 13 Dec 2023 02:25 )             36.8     12-13    135  |  106  |  34<H>  ----------------------------<  207<H>  4.3   |  19<L>  |  0.99    Ca    10.0      13 Dec 2023 02:25  Phos  3.8     12-13  Mg     1.8     12-13    TPro  6.9  /  Alb  3.9  /  TBili  0.3  /  DBili  x   /  AST  55<H>  /  ALT  162<H>  /  AlkPhos  68  12-13    LIVER FUNCTIONS - ( 13 Dec 2023 02:25 )  Alb: 3.9 g/dL / Pro: 6.9 g/dL / ALK PHOS: 68 U/L / ALT: 162 U/L / AST: 55 U/L / GGT: x           PT/INR - ( 13 Dec 2023 02:25 )   PT: 10.9 sec;   INR: 1.04 ratio         PTT - ( 13 Dec 2023 02:25 )  PTT:67.0 sec      Urinalysis Basic - ( 13 Dec 2023 02:25 )    Color: x / Appearance: x / SG: x / pH: x  Gluc: 207 mg/dL / Ketone: x  / Bili: x / Urobili: x   Blood: x / Protein: x / Nitrite: x   Leuk Esterase: x / RBC: x / WBC x   Sq Epi: x / Non Sq Epi: x / Bacteria: x      IMAGING: IABP in good position on CXR

## 2023-12-14 LAB
GLUCOSE BLDC GLUCOMTR-MCNC: 112 MG/DL — HIGH (ref 70–99)
GLUCOSE BLDC GLUCOMTR-MCNC: 112 MG/DL — HIGH (ref 70–99)
GLUCOSE BLDC GLUCOMTR-MCNC: 133 MG/DL — HIGH (ref 70–99)
GLUCOSE BLDC GLUCOMTR-MCNC: 133 MG/DL — HIGH (ref 70–99)
GLUCOSE BLDC GLUCOMTR-MCNC: 232 MG/DL — HIGH (ref 70–99)
GLUCOSE BLDC GLUCOMTR-MCNC: 232 MG/DL — HIGH (ref 70–99)
GLUCOSE BLDC GLUCOMTR-MCNC: 99 MG/DL — SIGNIFICANT CHANGE UP (ref 70–99)
GLUCOSE BLDC GLUCOMTR-MCNC: 99 MG/DL — SIGNIFICANT CHANGE UP (ref 70–99)

## 2023-12-14 PROCEDURE — 99291 CRITICAL CARE FIRST HOUR: CPT

## 2023-12-14 PROCEDURE — 99292 CRITICAL CARE ADDL 30 MIN: CPT | Mod: 25

## 2023-12-14 PROCEDURE — 99292 CRITICAL CARE ADDL 30 MIN: CPT | Mod: GC

## 2023-12-14 PROCEDURE — 99291 CRITICAL CARE FIRST HOUR: CPT | Mod: GC

## 2023-12-14 PROCEDURE — 71045 X-RAY EXAM CHEST 1 VIEW: CPT | Mod: 26

## 2023-12-14 RX ORDER — HYDRALAZINE HCL 50 MG
50 TABLET ORAL EVERY 8 HOURS
Refills: 0 | Status: DISCONTINUED | OUTPATIENT
Start: 2023-12-14 | End: 2023-12-25

## 2023-12-14 RX ADMIN — Medication 81 MILLIGRAM(S): at 12:34

## 2023-12-14 RX ADMIN — Medication 9 UNIT(S): at 16:50

## 2023-12-14 RX ADMIN — ISOSORBIDE DINITRATE 30 MILLIGRAM(S): 5 TABLET ORAL at 16:49

## 2023-12-14 RX ADMIN — BUDESONIDE AND FORMOTEROL FUMARATE DIHYDRATE 2 PUFF(S): 160; 4.5 AEROSOL RESPIRATORY (INHALATION) at 09:18

## 2023-12-14 RX ADMIN — ISOSORBIDE DINITRATE 30 MILLIGRAM(S): 5 TABLET ORAL at 05:37

## 2023-12-14 RX ADMIN — HEPARIN SODIUM 14 UNIT(S)/HR: 5000 INJECTION INTRAVENOUS; SUBCUTANEOUS at 05:12

## 2023-12-14 RX ADMIN — INSULIN GLARGINE 12 UNIT(S): 100 INJECTION, SOLUTION SUBCUTANEOUS at 22:44

## 2023-12-14 RX ADMIN — Medication 9 UNIT(S): at 12:30

## 2023-12-14 RX ADMIN — HEPARIN SODIUM 14 UNIT(S)/HR: 5000 INJECTION INTRAVENOUS; SUBCUTANEOUS at 07:19

## 2023-12-14 RX ADMIN — CARVEDILOL PHOSPHATE 25 MILLIGRAM(S): 80 CAPSULE, EXTENDED RELEASE ORAL at 05:12

## 2023-12-14 RX ADMIN — ISOSORBIDE DINITRATE 30 MILLIGRAM(S): 5 TABLET ORAL at 12:34

## 2023-12-14 RX ADMIN — Medication 50 MILLIGRAM(S): at 22:44

## 2023-12-14 RX ADMIN — CARVEDILOL PHOSPHATE 25 MILLIGRAM(S): 80 CAPSULE, EXTENDED RELEASE ORAL at 18:02

## 2023-12-14 RX ADMIN — ATORVASTATIN CALCIUM 80 MILLIGRAM(S): 80 TABLET, FILM COATED ORAL at 22:44

## 2023-12-14 RX ADMIN — Medication 50 MILLIGRAM(S): at 14:11

## 2023-12-14 NOTE — PROGRESS NOTE ADULT - SUBJECTIVE AND OBJECTIVE BOX
Patient seen and examined at bedside.    Overnight Events: No acute events overnight.       REVIEW OF SYSTEMS:  Constitutional:     [ x] negative [ ] fevers [ ] chills [ ] weight loss [ ] weight gain  HEENT:                  [ x] negative [ ] dry eyes [ ] eye irritation [ ] postnasal drip [ ] nasal congestion  CV:                         [x ] negative  [ ] chest pain [ ] orthopnea [ ] palpitations [ ] murmur  Resp:                     [ x] negative [ ] cough [ ] shortness of breath [ ] dyspnea [ ] wheezing [ ] sputum [ ]hemoptysis  GI:                          [ x] negative [ ] nausea [ ] vomiting [ ] diarrhea [ ] constipation [ ] abd pain [ ] dysphagia   :                        [ x] negative [ ] dysuria [ ] nocturia [ ] hematuria [ ] increased urinary frequency  Musculoskeletal: [ x] negative [ ] back pain [ ] myalgias [ ] arthralgias [ ] fracture  Skin:                       [ x] negative [ ] rash [ ] itch  Neurological:        [ x] negative [ ] headache [ ] dizziness [ ] syncope [ ] weakness [ ] numbness  Psychiatric:           [ x] negative [ ] anxiety [ ] depression  Endocrine:            [ x] negative [ ] diabetes [ ] thyroid problem  Heme/Lymph:      [ x] negative [ ] anemia [ ] bleeding problem  Allergic/Immune: [x ] negative [ ] itchy eyes [ ] nasal discharge [ ] hives [ ] angioedema    [x ] All other systems negative  [ ] Unable to assess ROS due to    Current Meds:  aspirin  chewable 81 milliGRAM(s) Oral daily  atorvastatin 80 milliGRAM(s) Oral at bedtime  budesonide  80 MICROgram(s)/formoterol 4.5 MICROgram(s) Inhaler 2 Puff(s) Inhalation two times a day  carvedilol 25 milliGRAM(s) Oral every 12 hours  chlorhexidine 2% Cloths 1 Application(s) Topical daily  clobetasol 0.05% Ointment 1 Application(s) Topical two times a day  heparin  Infusion 1300 Unit(s)/Hr IV Continuous <Continuous>  hydrALAZINE 50 milliGRAM(s) Oral every 8 hours  hydrOXYzine hydrochloride 25 milliGRAM(s) Oral two times a day PRN  insulin glargine Injectable (LANTUS) 12 Unit(s) SubCutaneous at bedtime  insulin lispro (ADMELOG) corrective regimen sliding scale   SubCutaneous at bedtime  insulin lispro (ADMELOG) corrective regimen sliding scale   SubCutaneous three times a day before meals  insulin lispro Injectable (ADMELOG) 9 Unit(s) SubCutaneous three times a day before meals  isosorbide   dinitrate Tablet (ISORDIL) 30 milliGRAM(s) Oral three times a day  polyethylene glycol 3350 17 Gram(s) Oral two times a day  senna 2 Tablet(s) Oral at bedtime      PAST MEDICAL & SURGICAL HISTORY:  HTN (hypertension)      CAD (coronary artery disease)  2009; stent      Intracranial hemorrhage  2008      Respiratory arrest  december 1st      Myocardial infarction, unspecified MI type, unspecified artery      History of coronary artery stent placement          Vitals:  T(F): 98 (12-14), Max: 98 (12-13)  HR: 68 (12-14) (67 - 81)  BP: 86/54 (12-13) (86/54 - 86/54)  RR: 17 (12-14)  SpO2: 97% (12-14)  I&O's Summary    13 Dec 2023 07:01  -  14 Dec 2023 07:00  --------------------------------------------------------  IN: 1286 mL / OUT: 1450 mL / NET: -164 mL    14 Dec 2023 07:01  -  14 Dec 2023 13:08  --------------------------------------------------------  IN: 190 mL / OUT: 400 mL / NET: -210 mL        Physical Exam:  Appearance: No acute distress; well appearing  Eyes: EOMI, no scleral icterus   HEENT: Normal oral mucosa  Cardiovascular: RRR, S1, S2, no murmurs, rubs, or gallops; no edema; no JVD. IABP in place.   Respiratory: Clear to auscultation bilaterally, no auditory stridor   Gastrointestinal: soft, non-tender, non-distended with normal bowel sounds  Musculoskeletal: No clubbing; no joint deformity   Neurologic: Moving all 4 extremities spontaneously, sensation grossly intact  Psychiatry: AAOx3, mood & affect appropriate  Skin: No rashes, ecchymoses, or cyanosis                            12.1   7.94  )-----------( 152      ( 13 Dec 2023 02:25 )             36.8     12-13    135  |  106  |  34<H>  ----------------------------<  207<H>  4.3   |  19<L>  |  0.99    Ca    10.0      13 Dec 2023 02:25  Phos  3.8     12-13  Mg     1.8     12-13    TPro  6.9  /  Alb  3.9  /  TBili  0.3  /  DBili  x   /  AST  55<H>  /  ALT  162<H>  /  AlkPhos  68  12-13    PT/INR - ( 13 Dec 2023 02:25 )   PT: 10.9 sec;   INR: 1.04 ratio         PTT - ( 13 Dec 2023 02:25 )  PTT:67.0 sec

## 2023-12-14 NOTE — PROGRESS NOTE ADULT - ATTENDING COMMENTS
59yo M with ischemic HFrEF, ADHF and cardiogenic shock awaiting heart transplant (status 2)  Neuro: no deficits, prn atarax for anxiety  Pulm: sating well on RA, prn albuterol and symbicort  CV: IABP 1:1 with optical sensor failure now RRA for pressure tracing, coreg, hydral, isordil  Renal: Cr under 1, no issues  GI: DASH diet  Heme: heparin gtt for LV thrombus and IABP.   ID: no active issues  Endo: BG controlled  Derm: Hepatitis immunization induced rash, improving on topical steroids  Lines: IABP (11/20), RRA (12/14) 57yo M with ischemic HFrEF, ADHF and cardiogenic shock awaiting heart transplant (status 2)  Neuro: no deficits, prn atarax for anxiety  Pulm: sating well on RA, prn albuterol and symbicort  CV: IABP 1:1 with optical sensor failure now RRA for pressure tracing, coreg, hydral, isordil  Renal: Cr under 1, no issues  GI: DASH diet  Heme: heparin gtt for LV thrombus and IABP.   ID: no active issues  Endo: BG controlled  Derm: Hepatitis immunization induced rash, improving on topical steroids  Lines: IABP (11/20), RRA (12/14)

## 2023-12-14 NOTE — PROGRESS NOTE ADULT - SUBJECTIVE AND OBJECTIVE BOX
LD VALENCIA  58y Male  MRN:[id number]    Patient is a 58y old  Male who presents with a chief complaint of NSTEMI (01 Nov 2023 20:29)    HPI:  59yo M w/ hx HTN, CAD w/ 1 stent in 2009, ICH (2008) presenting with abn ekg. Patient presented to [facility] where he was found to have STEMI, recommended to get cath however patient did not want to get it there so it left and came here.  Patient initially had cough, congestion, fever, was placed on antibiotics on Sunday.  Started feeling nauseous and had a presyncopal event after which he presented to ED last night.  Had chest pain as well.  Chest pain is midsternal.  Not currently having chest pain.  Received 4 aspirin 30 min pta. (01 Nov 2023 15:11)      Patient seen and evaluated at bedside in CCU. interval events noted    Interval HPI: remain in ccu. IABP in place        PAST MEDICAL & SURGICAL HISTORY:  HTN (hypertension)      CAD (coronary artery disease)  2009; stent      Intracranial hemorrhage  2008      Respiratory arrest  december 1st      Myocardial infarction, unspecified MI type, unspecified artery      History of coronary artery stent placement          REVIEW OF SYSTEMS:  as per hpi     VITALS:   Vital Signs Last 24 Hrs  T(C): 36.9 (14 Dec 2023 20:00), Max: 36.9 (14 Dec 2023 20:00)  T(F): 98.4 (14 Dec 2023 20:00), Max: 98.4 (14 Dec 2023 20:00)  HR: 84 (14 Dec 2023 21:00) (67 - 91)  BP: --  BP(mean): --  RR: 19 (14 Dec 2023 21:00) (13 - 22)  SpO2: 99% (14 Dec 2023 20:00) (95% - 99%)    Parameters below as of 14 Dec 2023 20:00  Patient On (Oxygen Delivery Method): room air          PHYSICAL EXAM:  GENERAL: NAD, comfortable   HEAD:  Atraumatic, Normocephalic  EYES: EOMI, PERRLA, conjunctiva and sclera clear  NECK: Supple, No JVD   CHEST/LUNG: Clear to auscultation bilaterally; No wheeze  HEART: Regular rate and rhythm; No murmurs, rubs, or gallops  ABDOMEN: Soft, Nontender, Nondistended; Bowel sounds present  EXTREMITIES:  2+ Peripheral Pulses, No clubbing, cyanosis, or edema  NEUROLOGY: nonfocal  SKIN: +rash  +iabp    Consultant(s) Notes Reviewed:  [x ] YES  [ ] NO  Care Discussed with Consultants/Other Providers [ x] YES  [ ] NO    MEDS:   MEDICATIONS  (STANDING):  aspirin  chewable 81 milliGRAM(s) Oral daily  atorvastatin 80 milliGRAM(s) Oral at bedtime  budesonide  80 MICROgram(s)/formoterol 4.5 MICROgram(s) Inhaler 2 Puff(s) Inhalation two times a day  carvedilol 25 milliGRAM(s) Oral every 12 hours  chlorhexidine 2% Cloths 1 Application(s) Topical daily  heparin  Infusion 1300 Unit(s)/Hr (14 mL/Hr) IV Continuous <Continuous>  hydrALAZINE 50 milliGRAM(s) Oral every 8 hours  insulin glargine Injectable (LANTUS) 12 Unit(s) SubCutaneous at bedtime  insulin lispro (ADMELOG) corrective regimen sliding scale   SubCutaneous three times a day before meals  insulin lispro (ADMELOG) corrective regimen sliding scale   SubCutaneous at bedtime  insulin lispro Injectable (ADMELOG) 9 Unit(s) SubCutaneous three times a day before meals  isosorbide   dinitrate Tablet (ISORDIL) 30 milliGRAM(s) Oral three times a day  polyethylene glycol 3350 17 Gram(s) Oral two times a day  senna 2 Tablet(s) Oral at bedtime    MEDICATIONS  (PRN):  hydrOXYzine hydrochloride 25 milliGRAM(s) Oral two times a day PRN Anxiety      ALLERGIES:  penicillins (Unknown)      LABS:                                                      12.1   7.94  )-----------( 152      ( 13 Dec 2023 02:25 )             36.8   12-13    135  |  106  |  34<H>  ----------------------------<  207<H>  4.3   |  19<L>  |  0.99    Ca    10.0      13 Dec 2023 02:25  Phos  3.8     12-13  Mg     1.8     12-13    TPro  6.9  /  Alb  3.9  /  TBili  0.3  /  DBili  x   /  AST  55<H>  /  ALT  162<H>  /  AlkPhos  68  12-13      < from: CT Abdomen and Pelvis No Cont (11.28.23 @ 03:38) >  IMPRESSION:  Mildly dilated colon and prominent but not overly dilated small bowel   with air-fluid levels. No discrete transition point. Findings are   suggestive of ileus.    Intra-aortic balloon pump with the inferior marker at the level of the   inferior mesenteric artery and the balloon overlying the origins of the   renal arteries, celiac artery, and superior mesenteric artery. Consider   repositioning. Concern for IABP positioning was discussed with LANETTE Hawthorne   on 11/28/2023 at 3:42 AM by Dr. Shepard.    < end of copied text >          < from: Colonoscopy (11.17.23 @ 10:35) >                                                                                                        Impression:          - The entire examined colon is normal on direct and retroflexion views.                       - No specimens collected.  Recommendation:      - Resume previous diet today.                       - No large polyps or masses detected, No objection from GI standpoint to                 proceed with heart transplantation/advanced heart therapies.                       - Please call back should any further questions or concerns arise.    < end of copied text >     < from: Xray Chest 1 View- PORTABLE-Urgent (11.01.23 @ 07:42) >    IMPRESSION:  Clear lungs.    ---End of Report ---        < end of copied text >  < from: TTE W or WO Ultrasound Enhancing Agent (11.01.23 @ 10:23) >  _____________________________     CONCLUSIONS:      1. Left ventricular cavity is moderately dilated. Left ventricular wall thickness is normal. Left ventricular systolic function is severely decreased with an ejection fraction of 32 % by Chinchilla's method of disks. Regional wall motion abnormalities present.   2. Multiple segmental abnormalities exist. See findings.   3. There is moderate (grade 2) left ventricular diastolic dysfunction, with indeterminant filling pressure.   4. Normal right ventricular cavity size, wall thickness, and systolic function.   5. No significant valvular disease.   6. No pericardial effusion seen.   7. Compared to the transthoracic echocardiogram performed on 1/25/2017 the areas of akinesis are unchanged but there has been a decline in LV systolic function with new areas of hypokinesis.    __________________________________________________________________    < end of copied text >  < from: TTE Limited W or WO Ultrasound Enhancing Agent (11.02.23 @ 07:41) >  __________________________     CONCLUSIONS:      1. After obtaining consent, Definity ultrasound enhancing agent was given for enhanced left ventricular opacification and improved delineation of the left ventricular endocardial borders. Left ventricular systolic function is severely decreased with a calculated ejection fraction of 22 % by the Chinchilla's biplane method of disks. There is a left ventricular thrombus.   2. Findings were discussed with Litzy BOSS on 11/2/2023 at 8.49am.   3. There is a left ventricular thrombus.    _________________________________________________________________    < end of copied text >   LD VALENCIA  58y Male  MRN:[id number]    Patient is a 58y old  Male who presents with a chief complaint of NSTEMI (01 Nov 2023 20:29)    HPI:  57yo M w/ hx HTN, CAD w/ 1 stent in 2009, ICH (2008) presenting with abn ekg. Patient presented to [facility] where he was found to have STEMI, recommended to get cath however patient did not want to get it there so it left and came here.  Patient initially had cough, congestion, fever, was placed on antibiotics on Sunday.  Started feeling nauseous and had a presyncopal event after which he presented to ED last night.  Had chest pain as well.  Chest pain is midsternal.  Not currently having chest pain.  Received 4 aspirin 30 min pta. (01 Nov 2023 15:11)      Patient seen and evaluated at bedside in CCU. interval events noted    Interval HPI: remain in ccu. IABP in place        PAST MEDICAL & SURGICAL HISTORY:  HTN (hypertension)      CAD (coronary artery disease)  2009; stent      Intracranial hemorrhage  2008      Respiratory arrest  december 1st      Myocardial infarction, unspecified MI type, unspecified artery      History of coronary artery stent placement          REVIEW OF SYSTEMS:  as per hpi     VITALS:   Vital Signs Last 24 Hrs  T(C): 36.9 (14 Dec 2023 20:00), Max: 36.9 (14 Dec 2023 20:00)  T(F): 98.4 (14 Dec 2023 20:00), Max: 98.4 (14 Dec 2023 20:00)  HR: 84 (14 Dec 2023 21:00) (67 - 91)  BP: --  BP(mean): --  RR: 19 (14 Dec 2023 21:00) (13 - 22)  SpO2: 99% (14 Dec 2023 20:00) (95% - 99%)    Parameters below as of 14 Dec 2023 20:00  Patient On (Oxygen Delivery Method): room air          PHYSICAL EXAM:  GENERAL: NAD, comfortable   HEAD:  Atraumatic, Normocephalic  EYES: EOMI, PERRLA, conjunctiva and sclera clear  NECK: Supple, No JVD   CHEST/LUNG: Clear to auscultation bilaterally; No wheeze  HEART: Regular rate and rhythm; No murmurs, rubs, or gallops  ABDOMEN: Soft, Nontender, Nondistended; Bowel sounds present  EXTREMITIES:  2+ Peripheral Pulses, No clubbing, cyanosis, or edema  NEUROLOGY: nonfocal  SKIN: +rash  +iabp    Consultant(s) Notes Reviewed:  [x ] YES  [ ] NO  Care Discussed with Consultants/Other Providers [ x] YES  [ ] NO    MEDS:   MEDICATIONS  (STANDING):  aspirin  chewable 81 milliGRAM(s) Oral daily  atorvastatin 80 milliGRAM(s) Oral at bedtime  budesonide  80 MICROgram(s)/formoterol 4.5 MICROgram(s) Inhaler 2 Puff(s) Inhalation two times a day  carvedilol 25 milliGRAM(s) Oral every 12 hours  chlorhexidine 2% Cloths 1 Application(s) Topical daily  heparin  Infusion 1300 Unit(s)/Hr (14 mL/Hr) IV Continuous <Continuous>  hydrALAZINE 50 milliGRAM(s) Oral every 8 hours  insulin glargine Injectable (LANTUS) 12 Unit(s) SubCutaneous at bedtime  insulin lispro (ADMELOG) corrective regimen sliding scale   SubCutaneous three times a day before meals  insulin lispro (ADMELOG) corrective regimen sliding scale   SubCutaneous at bedtime  insulin lispro Injectable (ADMELOG) 9 Unit(s) SubCutaneous three times a day before meals  isosorbide   dinitrate Tablet (ISORDIL) 30 milliGRAM(s) Oral three times a day  polyethylene glycol 3350 17 Gram(s) Oral two times a day  senna 2 Tablet(s) Oral at bedtime    MEDICATIONS  (PRN):  hydrOXYzine hydrochloride 25 milliGRAM(s) Oral two times a day PRN Anxiety      ALLERGIES:  penicillins (Unknown)      LABS:                                                      12.1   7.94  )-----------( 152      ( 13 Dec 2023 02:25 )             36.8   12-13    135  |  106  |  34<H>  ----------------------------<  207<H>  4.3   |  19<L>  |  0.99    Ca    10.0      13 Dec 2023 02:25  Phos  3.8     12-13  Mg     1.8     12-13    TPro  6.9  /  Alb  3.9  /  TBili  0.3  /  DBili  x   /  AST  55<H>  /  ALT  162<H>  /  AlkPhos  68  12-13      < from: CT Abdomen and Pelvis No Cont (11.28.23 @ 03:38) >  IMPRESSION:  Mildly dilated colon and prominent but not overly dilated small bowel   with air-fluid levels. No discrete transition point. Findings are   suggestive of ileus.    Intra-aortic balloon pump with the inferior marker at the level of the   inferior mesenteric artery and the balloon overlying the origins of the   renal arteries, celiac artery, and superior mesenteric artery. Consider   repositioning. Concern for IABP positioning was discussed with LANETTE Hawthorne   on 11/28/2023 at 3:42 AM by Dr. Shepard.    < end of copied text >          < from: Colonoscopy (11.17.23 @ 10:35) >                                                                                                        Impression:          - The entire examined colon is normal on direct and retroflexion views.                       - No specimens collected.  Recommendation:      - Resume previous diet today.                       - No large polyps or masses detected, No objection from GI standpoint to                 proceed with heart transplantation/advanced heart therapies.                       - Please call back should any further questions or concerns arise.    < end of copied text >     < from: Xray Chest 1 View- PORTABLE-Urgent (11.01.23 @ 07:42) >    IMPRESSION:  Clear lungs.    ---End of Report ---        < end of copied text >  < from: TTE W or WO Ultrasound Enhancing Agent (11.01.23 @ 10:23) >  _____________________________     CONCLUSIONS:      1. Left ventricular cavity is moderately dilated. Left ventricular wall thickness is normal. Left ventricular systolic function is severely decreased with an ejection fraction of 32 % by Chinchilla's method of disks. Regional wall motion abnormalities present.   2. Multiple segmental abnormalities exist. See findings.   3. There is moderate (grade 2) left ventricular diastolic dysfunction, with indeterminant filling pressure.   4. Normal right ventricular cavity size, wall thickness, and systolic function.   5. No significant valvular disease.   6. No pericardial effusion seen.   7. Compared to the transthoracic echocardiogram performed on 1/25/2017 the areas of akinesis are unchanged but there has been a decline in LV systolic function with new areas of hypokinesis.    __________________________________________________________________    < end of copied text >  < from: TTE Limited W or WO Ultrasound Enhancing Agent (11.02.23 @ 07:41) >  __________________________     CONCLUSIONS:      1. After obtaining consent, Definity ultrasound enhancing agent was given for enhanced left ventricular opacification and improved delineation of the left ventricular endocardial borders. Left ventricular systolic function is severely decreased with a calculated ejection fraction of 22 % by the Chinchilla's biplane method of disks. There is a left ventricular thrombus.   2. Findings were discussed with Litzy BOSS on 11/2/2023 at 8.49am.   3. There is a left ventricular thrombus.    _________________________________________________________________    < end of copied text >

## 2023-12-14 NOTE — PROGRESS NOTE ADULT - ASSESSMENT
59 yo male h/o htn, cad s/p pci, ICH, here with NSTEMI  s/p intubation and cath. now in CCU    NSTEMI  s/p  cath  cath results noted. multi vessel dz., CTSx f/u.   hep gtt  pt with vtach/fib arrest again on 11/8. s/p ACLS and ROSC.   now extubated  IABP in place  mngt as per CCU  plan for transplant as per HF and transplant team  transplant w/u ongoing.   s/p colonoscopy 11/17. normal  now listed for transplant as of 11/22    LV thrombus   hep gtt    acute on chronic systolic heart failure  heart failure team f/u    pna  resolved     covid  resolved  now off isolation     h/o ICH  resolved    agitation  psy consult f/u    bacteremia  id following  iv abx  f/u repeat cult      vomiting and abd pain  ileus on CT  bowel regimen  gi f/u  +bm    now resolved.     IABP malposition on CT  mngt as per ccu. iabp adjusted    rash  possible drug reaction  derm f/u  improving    mngt as per CCU team          Advanced care planning was discussed with patient and family.  Advanced care planning forms were reviewed and discussed as appropriate.  Differential diagnosis and plan of care discussed with patient after the evaluation.   Pain assessed and judicious use of narcotics when appropriate was discussed.  Importance of Fall prevention discussed.  Counseling on Smoking and Alcohol cessation was offered when appropriate.  Counseling on Diet, exercise, and medication compliance was done.       Approx 75 minutes spent.

## 2023-12-14 NOTE — PROGRESS NOTE ADULT - SUBJECTIVE AND OBJECTIVE BOX
DEVORAH VALENCIA  MRN-[id number]  Patient is a 58y old  Male who presents with a chief complaint of Chest pain (13 Dec 2023 22:18)    HPI:  pt seen and approx 1:30 pm  in [facility]    57yo M w/ hx HTN, CAD w/ 1 stent in , ICH () presenting with abn ekg. Patient presented to [facility] where he was found to have STEMI, recommended to get cath however patient did not want to get it there so it left and came here.  Patient initially had cough, congestion, fever, was placed on antibiotics on .  Started feeling nauseous and had a presyncopal event after which he presented to ED last night.  Had chest pain as well.  Chest pain is midsternal.  Not currently having chest pain.  Received 4 aspirin 30 min pta. (2023 15:11)      Hospital Course:    24 HOUR EVENTS:    REVIEW OF SYSTEMS:    CONSTITUTIONAL: No weakness, fevers or chills  EYES/ENT: No visual changes;  No vertigo or throat pain   NECK: No pain or stiffness  RESPIRATORY: No cough, wheezing, hemoptysis; No shortness of breath  CARDIOVASCULAR: No chest pain or palpitations  GASTROINTESTINAL: No abdominal or epigastric pain. No nausea, vomiting, or hematemesis; No diarrhea or constipation. No melena or hematochezia.  GENITOURINARY: No dysuria, frequency or hematuria  NEUROLOGICAL: No numbness or weakness  SKIN: No itching, rashes      ICU Vital Signs Last 24 Hrs  T(C): 36.9 (14 Dec 2023 20:00), Max: 36.9 (14 Dec 2023 20:00)  T(F): 98.4 (14 Dec 2023 20:00), Max: 98.4 (14 Dec 2023 20:00)  HR: 84 (14 Dec 2023 21:00) (67 - 91)  BP: --  BP(mean): --  ABP: --  ABP(mean): --  RR: 19 (14 Dec 2023 21:00) (13 - 22)  SpO2: 99% (14 Dec 2023 20:00) (95% - 99%)    O2 Parameters below as of 14 Dec 2023 20:00  Patient On (Oxygen Delivery Method): room air            CVP(mm Hg): --  CO: --  CI: --  PA: --  PA(mean): --  PA(direct): --  PCWP: --  LA: --  RA: --  SVR: --  SVRI: --  PVR: --  PVRI: --    POCT Blood Glucose.: 133 mg/dL (23 @ 16:48)  POCT Blood Glucose.: 99 mg/dL (23 @ 12:21)  POCT Blood Glucose.: 112 mg/dL (23 @ 07:43)  POCT Blood Glucose.: 174 mg/dL (23 @ 22:02)    CAPILLARY BLOOD GLUCOSE      POCT Blood Glucose.: 133 mg/dL (14 Dec 2023 16:48)      PHYSICAL EXAM:  GENERAL: No acute distress, well-developed  HEAD:  Atraumatic, Normocephalic  EYES: EOMI, PERRLA, conjunctiva and sclera clear  NECK: Supple, no lymphadenopathy, no JVD  CHEST/LUNG: CTAB; No wheezes, rales, or rhonchi  HEART: Regular rate and rhythm. Normal S1/S2. No murmurs, rubs, or gallops  ABDOMEN: Soft, non-tender, non-distended; normal bowel sounds, no organomegaly  EXTREMITIES:  2+ peripheral pulses b/l, No clubbing, cyanosis, or edema  NEUROLOGY: A&O x 3, no focal deficits  SKIN: No rashes or lesions    ============================I/O===========================   I&O's Detail    13 Dec 2023 07:  -  14 Dec 2023 07:00  --------------------------------------------------------  IN:    Heparin: 266 mL    Oral Fluid: 1020 mL  Total IN: 1286 mL    OUT:    Voided (mL): 1450 mL  Total OUT: 1450 mL    Total NET: -164 mL      14 Dec 2023 07:01  -  14 Dec 2023 21:51  --------------------------------------------------------  IN:    Heparin: 154 mL    Oral Fluid: 360 mL  Total IN: 514 mL    OUT:    Voided (mL): 1025 mL  Total OUT: 1025 mL    Total NET: -511 mL        ============================ LABS =========================                        12.1   7.94  )-----------( 152      ( 13 Dec 2023 02:25 )             36.8         135  |  106  |  34<H>  ----------------------------<  207<H>  4.3   |  19<L>  |  0.99    Ca    10.0      13 Dec 2023 02:25  Phos  3.8       Mg     1.8         TPro  6.9  /  Alb  3.9  /  TBili  0.3  /  DBili  x   /  AST  55<H>  /  ALT  162<H>  /  AlkPhos  68                  LIVER FUNCTIONS - ( 13 Dec 2023 02:25 )  Alb: 3.9 g/dL / Pro: 6.9 g/dL / ALK PHOS: 68 U/L / ALT: 162 U/L / AST: 55 U/L / GGT: x           PT/INR - ( 13 Dec 2023 02:25 )   PT: 10.9 sec;   INR: 1.04 ratio         PTT - ( 13 Dec 2023 02:25 )  PTT:67.0 sec      Urinalysis Basic - ( 13 Dec 2023 02:25 )    Color: x / Appearance: x / SG: x / pH: x  Gluc: 207 mg/dL / Ketone: x  / Bili: x / Urobili: x   Blood: x / Protein: x / Nitrite: x   Leuk Esterase: x / RBC: x / WBC x   Sq Epi: x / Non Sq Epi: x / Bacteria: x      ======================Micro/Rad/Cardio=================  Telemtry: Reviewed   EKG: Reviewed  CXR: Reviewed  Culture: Reviewed   Echo:   Cath: Cardiac Cath Lab - Adult:   [facility]  Department of Cardiology  [address] [place] [address] [phone]  Cath Lab Report -- Comprehensive Report  Patient: LD VALENCIA  Study date: 2017  Account number: [id number]  MR number: [id number]  : 1965  Gender: Male  Race: W  Case Physician(s):  Jairon Holguin M.D.  Referring Physician:  Luc Lynn M.D.  INDICATIONS: Unstable angina - CCS4.  HISTORY: The patient has a history of coronary artery disease. The patient  hashypertension and medication-treated dyslipidemia.  PROCEDURE:  --  Left heart catheterization with ventriculography.  --  Left coronary angiography.  --  Right coronary angiography.  TECHNIQUE: The risks and alternatives of the procedures and conscious  sedation were explained to the patient and informed consent was obtained.  Cardiac catheterization performed electively.  Local anesthetic given. Right radial artery access. A 6FR PRELUDE KIT was  inserted in the vessel. Left heart catheterization. Ventriculography was  performed. A 5FR FR4.0 EXPO catheter was utilized. Left coronary artery  angiography. The vessel was injected utilizing a 5FR FL3.5 EXPO catheter.  Right coronary artery angiography. The vessel was injected utilizing a 5FR  FR4.0 EXPO catheter. RADIATION EXPOSURE: 1.1 min.  CONTRAST GIVEN: Omnipaque 55 ml.  MEDICATIONS GIVEN: Midazolam, 1 mg, IV. Fentanyl, 25 mcg, IV. Verapamil  (Isoptin, Calan, Covera), 2.5 mg, IA. Heparin, 3000 units, IA.  VENTRICLES: Global left ventricular function was moderately depressed. EF  estimated was 40 %.  CORONARY VESSELS: The coronary circulation is right dominant.  LM:   --  LM: Normal.  LAD:   --  Proximal LAD: There was a 50 % stenosis.  CX:   --  Circumflex: Normal.  RCA:   --  Mid RCA: There was a 40 % stenosis.  --  Distal RCA: There was a 50 % stenosis.  COMPLICATIONS: There were no complications.  DIAGNOSTIC RECOMMENDATIONS: The patient should continue with the present  medications.  Prepared and signed by  Jairon Holguin M.D.  Signed 2017 12:20:13  HEMODYNAMIC TABLES  Pressures:  Baseline/ Room Air  Pressures:  - HR: 78  Pressures:  - Rhythm:  Pressures:  -- Aortic Pressure (S/D/M): --/--/99  Pressures:  -- Left Ventricle (s/edp): 157/39/--  Outputs:  Baseline/ Room Air  Outputs:  -- CALCULATIONS[age]  Outputs:  -- CALCULATIONS: Body Surface Area: 2.05  Outputs:  -- CALCULATIONS: Height in cm: 175.00  Outputs:  -- CALCULATIONS: Sex: Male  Outputs:  -- CALCULATIONS: Weight in k.40 (17 @ 21:55)    ======================================================  PAST MEDICAL & SURGICAL HISTORY:  HTN (hypertension)      CAD (coronary artery disease)  ; stent      Intracranial hemorrhage        Respiratory arrest        Myocardial infarction, unspecified MI type, unspecified artery      History of coronary artery stent placement        ====================ASSESSMENT ==============            Plan:  ====================CARDIOVASCULAR==================  Vfib arrest i/s/o ischemia  - Lido gtt off   - PO Amio load - total of 5g per EP complete   - Amio dc'd  for rising LFTs  - Keep K > 4, Mag > 2.2     Cardiogenic shock requiring IABP (- , -)  - Likely 2/2 NSTEMI and ADHF  -  LHC: pLAD 100 % in-stent restenosis & mRCA, 100 %. PCWP 30. IABP placed.  -  TTE: LV dilated. EF 32 %. Regional WMAs present, mod (grade 2) LV diastolic dysfunction  -  TTE: EF 22% and + LV thrombus  - IABP swapped  to RFA, continue 1:1 support  - Off Milrinone gtt @ 7:30 am   - James d/c'd due to elevated K levels  -  - reduced hydralazine 75 TID to 50 TID and ISD 40 TID to 30 TID for AL reduction  - c/w coreg 25 BID for GDMT  - UNOS status 2 as of     NSTEMI iso stent re-occlusion of pLAD and 100%  of RCA  - EKG on admission w/ LBBB  - DAPT: c/w ASA, Brilinta d/c'd per transplant w/u  - c/w lipitor 80  - cMR deferred given necessity of IABP  - CT sx not recommending CABG, undergoing AT eval    LV thrombus  - c/w heparin gtt w/ therapeutic PTT    carvedilol 25 milliGRAM(s) Oral every 12 hours  hydrALAZINE 50 milliGRAM(s) Oral every 8 hours  isosorbide   dinitrate Tablet (ISORDIL) 30 milliGRAM(s) Oral three times a day      ====================== NEUROLOGY=====================  Anxiety  - No Seroquel/antipsychotics since vfib arrest and prolonged QTC  - Psych Eval, recommended SSRI, but pt. refused   - Psych recommending atarax PRN  - Continue to monitor mental status    PT/Conditioning  - Continue band exercises while on bedrest s/t IABP    hydrOXYzine hydrochloride 25 milliGRAM(s) Oral two times a day PRN Anxiety    ==================== RESPIRATORY======================  Acute Hypoxemic Respiratory Failure  - s/p x2 intubations for cardiogenic pulm edema and the in setting of cardiac arrest, resolved - extubated 11/10  - Currently maintaining >95% sats on room air  - Continue incentive spirometry and monitoring of sp02    Asthma  - c/w albuterol, symbicort and spiriva  - On trelegy at home  - Continue to monitor SpO2 with goal >94%      budesonide  80 MICROgram(s)/formoterol 4.5 MICROgram(s) Inhaler 2 Puff(s) Inhalation two times a day    ===================== RENAL =========================  Non-oliguric ARIC, resolved   - Baseline Cr: -  - Renal US: no evidence of renal artery stenosis  - Trend BMP, lytes daily, replace as needed  - Continue Strict I/Os, avoid nephrotoxins    Mild Hyponatremia/Hyperkalemia iso ARIC  - resolved  - Urea powder d/c'd per renal  - Trend daily  - Continue monitoring urine output, lytes, SCr/ BUN  - Replete lytes prn with goal K >4 and Mg >2    23 @ 07:01  -  23 @ 07:00  --------------------------------------------------------  IN: 1286 mL / OUT: 1450 mL / NET: -164 mL    23 @ 07:01  -  23 @ 21:51  --------------------------------------------------------  IN: 514 mL / OUT: 1025 mL / NET: -511 mL      Renal Replacement Therapy:  [ ] CRRT      [ ] IHD, Last Session:    Fluid removal:     [ ] Diuretic therapy, Regimen:       ==================== GASTROINTESTINAL===================  Constipation/ileus, resolved  - regular diet  - bowel reg prn    Last BM:   Indication for Stress Ulcer Prophylaxis, [ ] Yes    [ ] No   If Yes, Medication:     polyethylene glycol 3350 17 Gram(s) Oral two times a day  senna 2 Tablet(s) Oral at bedtime    ========================INFECTIOUS DISEASE================  Positive blood culture, resolved  - Repeat BCx drawn 11/30 for leukocytosis to 12k - positive on , G+ rods in anaerobic bottle, suspect contaminant  - Repeat cultures x2 sent  per transplant ID recs - no growth to date  - Vancomycin by trough (-)  - Final microbial ID: Cutibacterium acnes, per ID this is likely to be a skin contaminant, vanc dc'd.   - Dental eval  to rule out infectious etiology that would have led to bacteremia, no significant findings on exam.     Enterococcus faecalis bacteremia, resolved  - BCx + for enterococcus faecalis x2, Staph epi x1 (likely contaminant)- pan sensitive   - Urine cx  + enterococcus faecalis  - BCx  no growth   - IABP site swapped to RFA   - s/p Vancomycin 1g q12h (-)  - CT A/P negative for infectious pathology    COVID, resolved  - Off airborne precautions     Pre-transplant ID w/u   - Trend ID recs for serologies   - Colonoscopy  - normal   - Chest CT  - improved LLL aeration  - s/p immunizations    T(C): 36.9 (23 @ 20:00), Max: 36.9 (23 @ 20:00)  WBC Count: 7.94 K/uL (23 @ 02:25)  WBC Count: 9.66 K/uL (23 @ 00:38)        Current Antibiotics with start date:       ===================HEMATOLOGIC/ONC ===================  H/H & plts stable  - Monitor H/H and plts  - VTE PPX: heparin gtt    Hemoglobin: 12.1 g/dL (23 @ 02:25)  Hemoglobin: 12.2 g/dL (23 @ 00:38)    Platelet Count - Automated: 152 K/uL (23 @ 02:25)  Platelet Count - Automated: 147 K/uL (23 @ 00:38)    Chemical VTE Prophylaxis:  [ ] Lovenox    [ ] SQH   [ ]NA  Systemic Anticogaulation:  [ ] Yes    [ ] No,  If Yes, Medication:     aspirin  chewable 81 milliGRAM(s) Oral daily  heparin  Infusion 1300 Unit(s)/Hr (14 mL/Hr) IV Continuous <Continuous>    =======================    ENDOCRINE  =====================  Type 2 DM  - A1c 8.3, glucose controlled  - Continue lantus, premeal, low ISS  - continue FS    POCT Blood Glucose.: 133 mg/dL (23 @ 16:48)  POCT Blood Glucose.: 99 mg/dL (23 @ 12:21)  POCT Blood Glucose.: 112 mg/dL (23 @ 07:43)  POCT Blood Glucose.: 174 mg/dL (23 @ 22:02)        atorvastatin 80 milliGRAM(s) Oral at bedtime  insulin glargine Injectable (LANTUS) 12 Unit(s) SubCutaneous at bedtime  insulin lispro (ADMELOG) corrective regimen sliding scale   SubCutaneous at bedtime  insulin lispro (ADMELOG) corrective regimen sliding scale   SubCutaneous three times a day before meals  insulin lispro Injectable (ADMELOG) 9 Unit(s) SubCutaneous three times a day before meals      Patient requires continuous monitoring with bedside rhythm monitoring, pulse ox monitoring, and intermittent blood gas analysis. Care plan discussed with ICU care team. Patient remained critical and at risk for life threatening decompensation.  Patient seen, examined and plan discussed with CCU team during rounds.       I have personally provided __30-__ minutes of critical care time excluding time spent on separate procedures, in addition to initial critical care time provided by the CICU Attending, Dr. Her.       DEVORAH VALENCIA  MRN-[id number]  Patient is a 58y old  Male who presents with a chief complaint of Chest pain (13 Dec 2023 22:18)    HPI:  pt seen and approx 1:30 pm  in [facility]    57yo M w/ hx HTN, CAD w/ 1 stent in , ICH () presenting with abn ekg. Patient presented to [facility] where he was found to have STEMI, recommended to get cath however patient did not want to get it there so it left and came here.  Patient initially had cough, congestion, fever, was placed on antibiotics on .  Started feeling nauseous and had a presyncopal event after which he presented to ED last night.  Had chest pain as well.  Chest pain is midsternal.  Not currently having chest pain.  Received 4 aspirin 30 min pta. (2023 15:11)      Hospital Course:    24 HOUR EVENTS:    REVIEW OF SYSTEMS:    CONSTITUTIONAL: No weakness, fevers or chills  EYES/ENT: No visual changes;  No vertigo or throat pain   NECK: No pain or stiffness  RESPIRATORY: No cough, wheezing, hemoptysis; No shortness of breath  CARDIOVASCULAR: No chest pain or palpitations  GASTROINTESTINAL: No abdominal or epigastric pain. No nausea, vomiting, or hematemesis; No diarrhea or constipation. No melena or hematochezia.  GENITOURINARY: No dysuria, frequency or hematuria  NEUROLOGICAL: No numbness or weakness  SKIN: No itching, rashes      ICU Vital Signs Last 24 Hrs  T(C): 36.9 (14 Dec 2023 20:00), Max: 36.9 (14 Dec 2023 20:00)  T(F): 98.4 (14 Dec 2023 20:00), Max: 98.4 (14 Dec 2023 20:00)  HR: 84 (14 Dec 2023 21:00) (67 - 91)  BP: --  BP(mean): --  ABP: --  ABP(mean): --  RR: 19 (14 Dec 2023 21:00) (13 - 22)  SpO2: 99% (14 Dec 2023 20:00) (95% - 99%)    O2 Parameters below as of 14 Dec 2023 20:00  Patient On (Oxygen Delivery Method): room air            CVP(mm Hg): --  CO: --  CI: --  PA: --  PA(mean): --  PA(direct): --  PCWP: --  LA: --  RA: --  SVR: --  SVRI: --  PVR: --  PVRI: --    POCT Blood Glucose.: 133 mg/dL (23 @ 16:48)  POCT Blood Glucose.: 99 mg/dL (23 @ 12:21)  POCT Blood Glucose.: 112 mg/dL (23 @ 07:43)  POCT Blood Glucose.: 174 mg/dL (23 @ 22:02)    CAPILLARY BLOOD GLUCOSE      POCT Blood Glucose.: 133 mg/dL (14 Dec 2023 16:48)      PHYSICAL EXAM:  GENERAL: No acute distress, well-developed  HEAD:  Atraumatic, Normocephalic  EYES: EOMI, PERRLA, conjunctiva and sclera clear  NECK: Supple, no lymphadenopathy, no JVD  CHEST/LUNG: CTAB; No wheezes, rales, or rhonchi  HEART: Regular rate and rhythm. Normal S1/S2. No murmurs, rubs, or gallops  ABDOMEN: Soft, non-tender, non-distended; normal bowel sounds, no organomegaly  EXTREMITIES:  2+ peripheral pulses b/l, No clubbing, cyanosis, or edema  NEUROLOGY: A&O x 3, no focal deficits  SKIN: No rashes or lesions    ============================I/O===========================   I&O's Detail    13 Dec 2023 07:  -  14 Dec 2023 07:00  --------------------------------------------------------  IN:    Heparin: 266 mL    Oral Fluid: 1020 mL  Total IN: 1286 mL    OUT:    Voided (mL): 1450 mL  Total OUT: 1450 mL    Total NET: -164 mL      14 Dec 2023 07:01  -  14 Dec 2023 21:51  --------------------------------------------------------  IN:    Heparin: 154 mL    Oral Fluid: 360 mL  Total IN: 514 mL    OUT:    Voided (mL): 1025 mL  Total OUT: 1025 mL    Total NET: -511 mL        ============================ LABS =========================                        12.1   7.94  )-----------( 152      ( 13 Dec 2023 02:25 )             36.8         135  |  106  |  34<H>  ----------------------------<  207<H>  4.3   |  19<L>  |  0.99    Ca    10.0      13 Dec 2023 02:25  Phos  3.8       Mg     1.8         TPro  6.9  /  Alb  3.9  /  TBili  0.3  /  DBili  x   /  AST  55<H>  /  ALT  162<H>  /  AlkPhos  68                  LIVER FUNCTIONS - ( 13 Dec 2023 02:25 )  Alb: 3.9 g/dL / Pro: 6.9 g/dL / ALK PHOS: 68 U/L / ALT: 162 U/L / AST: 55 U/L / GGT: x           PT/INR - ( 13 Dec 2023 02:25 )   PT: 10.9 sec;   INR: 1.04 ratio         PTT - ( 13 Dec 2023 02:25 )  PTT:67.0 sec      Urinalysis Basic - ( 13 Dec 2023 02:25 )    Color: x / Appearance: x / SG: x / pH: x  Gluc: 207 mg/dL / Ketone: x  / Bili: x / Urobili: x   Blood: x / Protein: x / Nitrite: x   Leuk Esterase: x / RBC: x / WBC x   Sq Epi: x / Non Sq Epi: x / Bacteria: x      ======================Micro/Rad/Cardio=================  Telemtry: Reviewed   EKG: Reviewed  CXR: Reviewed  Culture: Reviewed   Echo:   Cath: Cardiac Cath Lab - Adult:   [facility]  Department of Cardiology  [address] [place] [address] [phone]  Cath Lab Report -- Comprehensive Report  Patient: LD VALENCIA  Study date: 2017  Account number: [id number]  MR number: [id number]  : 1965  Gender: Male  Race: W  Case Physician(s):  Jairon Holguin M.D.  Referring Physician:  Luc Lynn M.D.  INDICATIONS: Unstable angina - CCS4.  HISTORY: The patient has a history of coronary artery disease. The patient  hashypertension and medication-treated dyslipidemia.  PROCEDURE:  --  Left heart catheterization with ventriculography.  --  Left coronary angiography.  --  Right coronary angiography.  TECHNIQUE: The risks and alternatives of the procedures and conscious  sedation were explained to the patient and informed consent was obtained.  Cardiac catheterization performed electively.  Local anesthetic given. Right radial artery access. A 6FR PRELUDE KIT was  inserted in the vessel. Left heart catheterization. Ventriculography was  performed. A 5FR FR4.0 EXPO catheter was utilized. Left coronary artery  angiography. The vessel was injected utilizing a 5FR FL3.5 EXPO catheter.  Right coronary artery angiography. The vessel was injected utilizing a 5FR  FR4.0 EXPO catheter. RADIATION EXPOSURE: 1.1 min.  CONTRAST GIVEN: Omnipaque 55 ml.  MEDICATIONS GIVEN: Midazolam, 1 mg, IV. Fentanyl, 25 mcg, IV. Verapamil  (Isoptin, Calan, Covera), 2.5 mg, IA. Heparin, 3000 units, IA.  VENTRICLES: Global left ventricular function was moderately depressed. EF  estimated was 40 %.  CORONARY VESSELS: The coronary circulation is right dominant.  LM:   --  LM: Normal.  LAD:   --  Proximal LAD: There was a 50 % stenosis.  CX:   --  Circumflex: Normal.  RCA:   --  Mid RCA: There was a 40 % stenosis.  --  Distal RCA: There was a 50 % stenosis.  COMPLICATIONS: There were no complications.  DIAGNOSTIC RECOMMENDATIONS: The patient should continue with the present  medications.  Prepared and signed by  Jairon Holguin M.D.  Signed 2017 12:20:13  HEMODYNAMIC TABLES  Pressures:  Baseline/ Room Air  Pressures:  - HR: 78  Pressures:  - Rhythm:  Pressures:  -- Aortic Pressure (S/D/M): --/--/99  Pressures:  -- Left Ventricle (s/edp): 157/39/--  Outputs:  Baseline/ Room Air  Outputs:  -- CALCULATIONS[age]  Outputs:  -- CALCULATIONS: Body Surface Area: 2.05  Outputs:  -- CALCULATIONS: Height in cm: 175.00  Outputs:  -- CALCULATIONS: Sex: Male  Outputs:  -- CALCULATIONS: Weight in k.40 (17 @ 21:55)    ======================================================  PAST MEDICAL & SURGICAL HISTORY:  HTN (hypertension)      CAD (coronary artery disease)  ; stent      Intracranial hemorrhage        Respiratory arrest        Myocardial infarction, unspecified MI type, unspecified artery      History of coronary artery stent placement        ====================ASSESSMENT ==============            Plan:  ====================CARDIOVASCULAR==================  Vfib arrest i/s/o ischemia  - Lido gtt off   - PO Amio load - total of 5g per EP complete   - Amio dc'd  for rising LFTs  - Keep K > 4, Mag > 2.2     Cardiogenic shock requiring IABP (- , -)  - Likely 2/2 NSTEMI and ADHF  -  LHC: pLAD 100 % in-stent restenosis & mRCA, 100 %. PCWP 30. IABP placed.  -  TTE: LV dilated. EF 32 %. Regional WMAs present, mod (grade 2) LV diastolic dysfunction  -  TTE: EF 22% and + LV thrombus  - IABP swapped  to RFA, continue 1:1 support  - Off Milrinone gtt @ 7:30 am   - James d/c'd due to elevated K levels  -  - reduced hydralazine 75 TID to 50 TID and ISD 40 TID to 30 TID for AL reduction  - c/w coreg 25 BID for GDMT  - UNOS status 2 as of     NSTEMI iso stent re-occlusion of pLAD and 100%  of RCA  - EKG on admission w/ LBBB  - DAPT: c/w ASA, Brilinta d/c'd per transplant w/u  - c/w lipitor 80  - cMR deferred given necessity of IABP  - CT sx not recommending CABG, undergoing AT eval    LV thrombus  - c/w heparin gtt w/ therapeutic PTT    carvedilol 25 milliGRAM(s) Oral every 12 hours  hydrALAZINE 50 milliGRAM(s) Oral every 8 hours  isosorbide   dinitrate Tablet (ISORDIL) 30 milliGRAM(s) Oral three times a day      ====================== NEUROLOGY=====================  Anxiety  - No Seroquel/antipsychotics since vfib arrest and prolonged QTC  - Psych Eval, recommended SSRI, but pt. refused   - Psych recommending atarax PRN  - Continue to monitor mental status    PT/Conditioning  - Continue band exercises while on bedrest s/t IABP    hydrOXYzine hydrochloride 25 milliGRAM(s) Oral two times a day PRN Anxiety    ==================== RESPIRATORY======================  Acute Hypoxemic Respiratory Failure  - s/p x2 intubations for cardiogenic pulm edema and the in setting of cardiac arrest, resolved - extubated 11/10  - Currently maintaining >95% sats on room air  - Continue incentive spirometry and monitoring of sp02    Asthma  - c/w albuterol, symbicort and spiriva  - On trelegy at home  - Continue to monitor SpO2 with goal >94%      budesonide  80 MICROgram(s)/formoterol 4.5 MICROgram(s) Inhaler 2 Puff(s) Inhalation two times a day    ===================== RENAL =========================  Non-oliguric ARIC, resolved   - Baseline Cr: -  - Renal US: no evidence of renal artery stenosis  - Trend BMP, lytes daily, replace as needed  - Continue Strict I/Os, avoid nephrotoxins    Mild Hyponatremia/Hyperkalemia iso ARIC  - resolved  - Urea powder d/c'd per renal  - Trend daily  - Continue monitoring urine output, lytes, SCr/ BUN  - Replete lytes prn with goal K >4 and Mg >2    23 @ 07:01  -  23 @ 07:00  --------------------------------------------------------  IN: 1286 mL / OUT: 1450 mL / NET: -164 mL    23 @ 07:01  -  23 @ 21:51  --------------------------------------------------------  IN: 514 mL / OUT: 1025 mL / NET: -511 mL      Renal Replacement Therapy:  [ ] CRRT      [ ] IHD, Last Session:    Fluid removal:     [ ] Diuretic therapy, Regimen:       ==================== GASTROINTESTINAL===================  Constipation/ileus, resolved  - regular diet  - bowel reg prn    Last BM:   Indication for Stress Ulcer Prophylaxis, [ ] Yes    [ ] No   If Yes, Medication:     polyethylene glycol 3350 17 Gram(s) Oral two times a day  senna 2 Tablet(s) Oral at bedtime    ========================INFECTIOUS DISEASE================  Positive blood culture, resolved  - Repeat BCx drawn 11/30 for leukocytosis to 12k - positive on , G+ rods in anaerobic bottle, suspect contaminant  - Repeat cultures x2 sent  per transplant ID recs - no growth to date  - Vancomycin by trough (-)  - Final microbial ID: Cutibacterium acnes, per ID this is likely to be a skin contaminant, vanc dc'd.   - Dental eval  to rule out infectious etiology that would have led to bacteremia, no significant findings on exam.     Enterococcus faecalis bacteremia, resolved  - BCx + for enterococcus faecalis x2, Staph epi x1 (likely contaminant)- pan sensitive   - Urine cx  + enterococcus faecalis  - BCx  no growth   - IABP site swapped to RFA   - s/p Vancomycin 1g q12h (-)  - CT A/P negative for infectious pathology    COVID, resolved  - Off airborne precautions     Pre-transplant ID w/u   - Trend ID recs for serologies   - Colonoscopy  - normal   - Chest CT  - improved LLL aeration  - s/p immunizations    T(C): 36.9 (23 @ 20:00), Max: 36.9 (23 @ 20:00)  WBC Count: 7.94 K/uL (23 @ 02:25)  WBC Count: 9.66 K/uL (23 @ 00:38)        Current Antibiotics with start date:       ===================HEMATOLOGIC/ONC ===================  H/H & plts stable  - Monitor H/H and plts  - VTE PPX: heparin gtt    Hemoglobin: 12.1 g/dL (23 @ 02:25)  Hemoglobin: 12.2 g/dL (23 @ 00:38)    Platelet Count - Automated: 152 K/uL (23 @ 02:25)  Platelet Count - Automated: 147 K/uL (23 @ 00:38)    Chemical VTE Prophylaxis:  [ ] Lovenox    [ ] SQH   [ ]NA  Systemic Anticogaulation:  [ ] Yes    [ ] No,  If Yes, Medication:     aspirin  chewable 81 milliGRAM(s) Oral daily  heparin  Infusion 1300 Unit(s)/Hr (14 mL/Hr) IV Continuous <Continuous>    =======================    ENDOCRINE  =====================  Type 2 DM  - A1c 8.3, glucose controlled  - Continue lantus, premeal, low ISS  - continue FS    POCT Blood Glucose.: 133 mg/dL (23 @ 16:48)  POCT Blood Glucose.: 99 mg/dL (23 @ 12:21)  POCT Blood Glucose.: 112 mg/dL (23 @ 07:43)  POCT Blood Glucose.: 174 mg/dL (23 @ 22:02)        atorvastatin 80 milliGRAM(s) Oral at bedtime  insulin glargine Injectable (LANTUS) 12 Unit(s) SubCutaneous at bedtime  insulin lispro (ADMELOG) corrective regimen sliding scale   SubCutaneous at bedtime  insulin lispro (ADMELOG) corrective regimen sliding scale   SubCutaneous three times a day before meals  insulin lispro Injectable (ADMELOG) 9 Unit(s) SubCutaneous three times a day before meals      Patient requires continuous monitoring with bedside rhythm monitoring, pulse ox monitoring, and intermittent blood gas analysis. Care plan discussed with ICU care team. Patient remained critical and at risk for life threatening decompensation.  Patient seen, examined and plan discussed with CCU team during rounds.       I have personally provided __30-__ minutes of critical care time excluding time spent on separate procedures, in addition to initial critical care time provided by the CICU Attending, Dr. Her.

## 2023-12-14 NOTE — PROGRESS NOTE ADULT - ATTENDING COMMENTS
Pt is sleeping comfortably.  No acute events reported overnight.  Appears close to euvolemia.  Remains critically ill on IABP 1:1 and afterload reducing agents. He had hypotension last week requiring downtitration of medications.  Labs with mild LFTs elevation.  Listed for heart tx UNOS status 2.

## 2023-12-14 NOTE — PROGRESS NOTE ADULT - PROBLEM SELECTOR PLAN 4
- Cr on admission 1.2, peaked to 4, now at relative baseline, 0.99.  - Support as above.  -monitor closely  - Appreciate CardioRenal input.

## 2023-12-14 NOTE — PROGRESS NOTE ADULT - SUBJECTIVE AND OBJECTIVE BOX
PATIENT:  DEVORAH VALENCIA  [id number]    CHIEF COMPLAINT:  Patient is a 58y old  Male who presents with a chief complaint of Chest pain (13 Dec 2023 22:18)      INTERVAL HISTORY/OVERNIGHT EVENTS:  The patient was seen and examined at bedside.     REVIEW OF SYSTEMS:  all other ROS negative except as per HPI.     MEDICATIONS:  MEDICATIONS  (STANDING):  aspirin  chewable 81 milliGRAM(s) Oral daily  atorvastatin 80 milliGRAM(s) Oral at bedtime  budesonide  80 MICROgram(s)/formoterol 4.5 MICROgram(s) Inhaler 2 Puff(s) Inhalation two times a day  carvedilol 25 milliGRAM(s) Oral every 12 hours  chlorhexidine 2% Cloths 1 Application(s) Topical daily  clobetasol 0.05% Ointment 1 Application(s) Topical two times a day  heparin  Infusion 1300 Unit(s)/Hr (14 mL/Hr) IV Continuous <Continuous>  hydrALAZINE 50 milliGRAM(s) Oral every 8 hours  insulin glargine Injectable (LANTUS) 12 Unit(s) SubCutaneous at bedtime  insulin lispro (ADMELOG) corrective regimen sliding scale   SubCutaneous at bedtime  insulin lispro (ADMELOG) corrective regimen sliding scale   SubCutaneous three times a day before meals  insulin lispro Injectable (ADMELOG) 9 Unit(s) SubCutaneous three times a day before meals  isosorbide   dinitrate Tablet (ISORDIL) 30 milliGRAM(s) Oral three times a day  polyethylene glycol 3350 17 Gram(s) Oral two times a day  senna 2 Tablet(s) Oral at bedtime    MEDICATIONS  (PRN):  hydrOXYzine hydrochloride 25 milliGRAM(s) Oral two times a day PRN Anxiety      ALLERGIES:  Allergies    penicillins (Unknown)    Intolerances        OBJECTIVE:  ICU Vital Signs Last 24 Hrs  T(C): 36.7 (13 Dec 2023 20:00), Max: 36.7 (13 Dec 2023 13:00)  T(F): 98 (13 Dec 2023 20:00), Max: 98 (13 Dec 2023 13:00)  HR: 68 (14 Dec 2023 07:00) (68 - 85)  BP: 86/54 (13 Dec 2023 17:00) (86/54 - 86/54)  BP(mean): 66 (13 Dec 2023 17:00) (66 - 66)  ABP: --  ABP(mean): --  RR: 17 (14 Dec 2023 07:00) (15 - 23)  SpO2: 99% (14 Dec 2023 07:00) (95% - 100%)    O2 Parameters below as of 14 Dec 2023 07:00  Patient On (Oxygen Delivery Method): room air            Adult Advanced Hemodynamics Last 24 Hrs  CVP(mm Hg): --  CVP(cm H2O): --  CO: --  CI: --  PA: --  PA(mean): --  PCWP: --  SVR: --  SVRI: --  PVR: --  PVRI: --  CAPILLARY BLOOD GLUCOSE      POCT Blood Glucose.: 112 mg/dL (14 Dec 2023 07:43)  POCT Blood Glucose.: 174 mg/dL (13 Dec 2023 22:02)  POCT Blood Glucose.: 144 mg/dL (13 Dec 2023 16:41)  POCT Blood Glucose.: 166 mg/dL (13 Dec 2023 12:33)  POCT Blood Glucose.: 130 mg/dL (13 Dec 2023 07:49)    CAPILLARY BLOOD GLUCOSE      POCT Blood Glucose.: 112 mg/dL (14 Dec 2023 07:43)    I&O's Summary    13 Dec 2023 07:01  -  14 Dec 2023 07:00  --------------------------------------------------------  IN: 1272 mL / OUT: 1450 mL / NET: -178 mL      Daily     Daily     PHYSICAL EXAMINATION:  General: WN/WD NAD  HEENT: PERRLA, EOMI, moist mucous membranes  Neurology: A&Ox3, nonfocal, MOISE x 4  Respiratory: CTA B/L, normal respiratory effort, no wheezes, crackles, rales  CV: RRR, S1S2, no murmurs, rubs or gallops  Abdominal: Soft, NT, ND +BS, Last BM  Extremities: No edema, + peripheral pulses  Incisions:   Tubes:    LABS:                          12.1   7.94  )-----------( 152      ( 13 Dec 2023 02:25 )             36.8     12-13    135  |  106  |  34<H>  ----------------------------<  207<H>  4.3   |  19<L>  |  0.99    Ca    10.0      13 Dec 2023 02:25  Phos  3.8     12-13  Mg     1.8     12-13    TPro  6.9  /  Alb  3.9  /  TBili  0.3  /  DBili  x   /  AST  55<H>  /  ALT  162<H>  /  AlkPhos  68  12-13    LIVER FUNCTIONS - ( 13 Dec 2023 02:25 )  Alb: 3.9 g/dL / Pro: 6.9 g/dL / ALK PHOS: 68 U/L / ALT: 162 U/L / AST: 55 U/L / GGT: x           PT/INR - ( 13 Dec 2023 02:25 )   PT: 10.9 sec;   INR: 1.04 ratio         PTT - ( 13 Dec 2023 02:25 )  PTT:67.0 sec      Urinalysis Basic - ( 13 Dec 2023 02:25 )    Color: x / Appearance: x / SG: x / pH: x  Gluc: 207 mg/dL / Ketone: x  / Bili: x / Urobili: x   Blood: x / Protein: x / Nitrite: x   Leuk Esterase: x / RBC: x / WBC x   Sq Epi: x / Non Sq Epi: x / Bacteria: x        TELEMETRY:     EKG:     IMAGING:       PATIENT:  DEVORAH VALENCIA  [id number]    CHIEF COMPLAINT:  Patient is a 58y old  Male who presents with a chief complaint of Chest pain (13 Dec 2023 22:18)      INTERVAL HISTORY/OVERNIGHT EVENTS:  The patient was seen and examined at bedside.     REVIEW OF SYSTEMS:  all other ROS negative except as per HPI.     MEDICATIONS:  MEDICATIONS  (STANDING):  aspirin  chewable 81 milliGRAM(s) Oral daily  atorvastatin 80 milliGRAM(s) Oral at bedtime  budesonide  80 MICROgram(s)/formoterol 4.5 MICROgram(s) Inhaler 2 Puff(s) Inhalation two times a day  carvedilol 25 milliGRAM(s) Oral every 12 hours  chlorhexidine 2% Cloths 1 Application(s) Topical daily  clobetasol 0.05% Ointment 1 Application(s) Topical two times a day  heparin  Infusion 1300 Unit(s)/Hr (14 mL/Hr) IV Continuous <Continuous>  hydrALAZINE 50 milliGRAM(s) Oral every 8 hours  insulin glargine Injectable (LANTUS) 12 Unit(s) SubCutaneous at bedtime  insulin lispro (ADMELOG) corrective regimen sliding scale   SubCutaneous at bedtime  insulin lispro (ADMELOG) corrective regimen sliding scale   SubCutaneous three times a day before meals  insulin lispro Injectable (ADMELOG) 9 Unit(s) SubCutaneous three times a day before meals  isosorbide   dinitrate Tablet (ISORDIL) 30 milliGRAM(s) Oral three times a day  polyethylene glycol 3350 17 Gram(s) Oral two times a day  senna 2 Tablet(s) Oral at bedtime    MEDICATIONS  (PRN):  hydrOXYzine hydrochloride 25 milliGRAM(s) Oral two times a day PRN Anxiety      ALLERGIES:  Allergies    penicillins (Unknown)    Intolerances        OBJECTIVE:  ICU Vital Signs Last 24 Hrs  T(C): 36.7 (13 Dec 2023 20:00), Max: 36.7 (13 Dec 2023 13:00)  T(F): 98 (13 Dec 2023 20:00), Max: 98 (13 Dec 2023 13:00)  HR: 68 (14 Dec 2023 07:00) (68 - 85)  BP: 86/54 (13 Dec 2023 17:00) (86/54 - 86/54)  BP(mean): 66 (13 Dec 2023 17:00) (66 - 66)  ABP: --  ABP(mean): --  RR: 17 (14 Dec 2023 07:00) (15 - 23)  SpO2: 99% (14 Dec 2023 07:00) (95% - 100%)    O2 Parameters below as of 14 Dec 2023 07:00  Patient On (Oxygen Delivery Method): room air            Adult Advanced Hemodynamics Last 24 Hrs  CVP(mm Hg): --  CVP(cm H2O): --  CO: --  CI: --  PA: --  PA(mean): --  PCWP: --  SVR: --  SVRI: --  PVR: --  PVRI: --  CAPILLARY BLOOD GLUCOSE      POCT Blood Glucose.: 112 mg/dL (14 Dec 2023 07:43)  POCT Blood Glucose.: 174 mg/dL (13 Dec 2023 22:02)  POCT Blood Glucose.: 144 mg/dL (13 Dec 2023 16:41)  POCT Blood Glucose.: 166 mg/dL (13 Dec 2023 12:33)  POCT Blood Glucose.: 130 mg/dL (13 Dec 2023 07:49)    CAPILLARY BLOOD GLUCOSE      POCT Blood Glucose.: 112 mg/dL (14 Dec 2023 07:43)    I&O's Summary    13 Dec 2023 07:01  -  14 Dec 2023 07:00  --------------------------------------------------------  IN: 1272 mL / OUT: 1450 mL / NET: -178 mL      Daily     Daily     PHYSICAL EXAMINATION:  General: WN/WD NAD  HEENT: PERRLA, EOMI, moist mucous membranes  Neurology: A&Ox3, nonfocal, MOISE x 4  Respiratory: CTA B/L, normal respiratory effort, no wheezes, crackles, rales  CV: RRR, S1S2, no murmurs, rubs or gallops  Abdominal: Soft, NT, ND +BS, Last BM  Extremities: No edema, + peripheral pulses  Incisions:   Tubes:    LABS:                          12.1   7.94  )-----------( 152      ( 13 Dec 2023 02:25 )             36.8     12-13    135  |  106  |  34<H>  ----------------------------<  207<H>  4.3   |  19<L>  |  0.99    Ca    10.0      13 Dec 2023 02:25  Phos  3.8     12-13  Mg     1.8     12-13    TPro  6.9  /  Alb  3.9  /  TBili  0.3  /  DBili  x   /  AST  55<H>  /  ALT  162<H>  /  AlkPhos  68  12-13    LIVER FUNCTIONS - ( 13 Dec 2023 02:25 )  Alb: 3.9 g/dL / Pro: 6.9 g/dL / ALK PHOS: 68 U/L / ALT: 162 U/L / AST: 55 U/L / GGT: x           PT/INR - ( 13 Dec 2023 02:25 )   PT: 10.9 sec;   INR: 1.04 ratio         PTT - ( 13 Dec 2023 02:25 )  PTT:67.0 sec      Urinalysis Basic - ( 13 Dec 2023 02:25 )    Color: x / Appearance: x / SG: x / pH: x  Gluc: 207 mg/dL / Ketone: x  / Bili: x / Urobili: x   Blood: x / Protein: x / Nitrite: x   Leuk Esterase: x / RBC: x / WBC x   Sq Epi: x / Non Sq Epi: x / Bacteria: x        TELEMETRY:     EKG:     IMAGING:       PATIENT:   DEVORAH VALENCIA  [id number]    CHIEF COMPLAINT:  Patient is a 58y old  Male who presents with a chief complaint of Chest pain (13 Dec 2023 22:18)      INTERVAL HISTORY/OVERNIGHT EVENTS:  The patient was seen and examined at bedside. c/o pain to R radial a-line placed yesterday.     REVIEW OF SYSTEMS:  all other ROS negative except as per HPI.     MEDICATIONS:  MEDICATIONS  (STANDING):  aspirin  chewable 81 milliGRAM(s) Oral daily  atorvastatin 80 milliGRAM(s) Oral at bedtime  budesonide  80 MICROgram(s)/formoterol 4.5 MICROgram(s) Inhaler 2 Puff(s) Inhalation two times a day  carvedilol 25 milliGRAM(s) Oral every 12 hours  chlorhexidine 2% Cloths 1 Application(s) Topical daily  clobetasol 0.05% Ointment 1 Application(s) Topical two times a day  heparin  Infusion 1300 Unit(s)/Hr (14 mL/Hr) IV Continuous <Continuous>  hydrALAZINE 50 milliGRAM(s) Oral every 8 hours  insulin glargine Injectable (LANTUS) 12 Unit(s) SubCutaneous at bedtime  insulin lispro (ADMELOG) corrective regimen sliding scale   SubCutaneous at bedtime  insulin lispro (ADMELOG) corrective regimen sliding scale   SubCutaneous three times a day before meals  insulin lispro Injectable (ADMELOG) 9 Unit(s) SubCutaneous three times a day before meals  isosorbide   dinitrate Tablet (ISORDIL) 30 milliGRAM(s) Oral three times a day  polyethylene glycol 3350 17 Gram(s) Oral two times a day  senna 2 Tablet(s) Oral at bedtime    MEDICATIONS  (PRN):  hydrOXYzine hydrochloride 25 milliGRAM(s) Oral two times a day PRN Anxiety      ALLERGIES:  Allergies    penicillins (Unknown)      OBJECTIVE:  ICU Vital Signs Last 24 Hrs  T(C): 36.7 (13 Dec 2023 20:00), Max: 36.7 (13 Dec 2023 13:00)  T(F): 98 (13 Dec 2023 20:00), Max: 98 (13 Dec 2023 13:00)  HR: 68 (14 Dec 2023 07:00) (68 - 85)  BP: 86/54 (13 Dec 2023 17:00) (86/54 - 86/54)  BP(mean): 66 (13 Dec 2023 17:00) (66 - 66)  ABP: --  ABP(mean): --  RR: 17 (14 Dec 2023 07:00) (15 - 23)  SpO2: 99% (14 Dec 2023 07:00) (95% - 100%)    O2 Parameters below as of 14 Dec 2023 07:00  Patient On (Oxygen Delivery Method): room air      CAPILLARY BLOOD GLUCOSE  POCT Blood Glucose.: 112 mg/dL (14 Dec 2023 07:43)  POCT Blood Glucose.: 174 mg/dL (13 Dec 2023 22:02)  POCT Blood Glucose.: 144 mg/dL (13 Dec 2023 16:41)  POCT Blood Glucose.: 166 mg/dL (13 Dec 2023 12:33)  POCT Blood Glucose.: 130 mg/dL (13 Dec 2023 07:49)      I&O's Summary    13 Dec 2023 07:01  -  14 Dec 2023 07:00  --------------------------------------------------------  IN: 1272 mL / OUT: 1450 mL / NET: -178 mL    PHYSICAL EXAMINATION:  General: WN/WD NAD  HEENT: PERRLA, EOMI, moist mucous membranes  Neurology: A&Ox3, nonfocal, MOISE x 4  Respiratory: CTA B/L, normal respiratory effort, no wheezes, crackles, rales  CV: RRR, S1S2, no murmurs, rubs or gallops  Abdominal: Soft, NT, ND +BS, Last BM  Extremities: R groin IABP site well appearing, no swelling or tenderness to palpation. No lower extremity edema, + peripheral pulses  Skin: bilateral upper extremity rash noted, improving    LABS:                          12.1   7.94  )-----------( 152      ( 13 Dec 2023 02:25 )             36.8     12-13    135  |  106  |  34<H>  ----------------------------<  207<H>  4.3   |  19<L>  |  0.99    Ca    10.0      13 Dec 2023 02:25  Phos  3.8     12-13  Mg     1.8     12-13    TPro  6.9  /  Alb  3.9  /  TBili  0.3  /  DBili  x   /  AST  55<H>  /  ALT  162<H>  /  AlkPhos  68  12-13    LIVER FUNCTIONS - ( 13 Dec 2023 02:25 )  Alb: 3.9 g/dL / Pro: 6.9 g/dL / ALK PHOS: 68 U/L / ALT: 162 U/L / AST: 55 U/L / GGT: x           PT/INR - ( 13 Dec 2023 02:25 )   PT: 10.9 sec;   INR: 1.04 ratio         PTT - ( 13 Dec 2023 02:25 )  PTT:67.0 sec      Urinalysis Basic - ( 13 Dec 2023 02:25 )    Color: x / Appearance: x / SG: x / pH: x  Gluc: 207 mg/dL / Ketone: x  / Bili: x / Urobili: x   Blood: x / Protein: x / Nitrite: x   Leuk Esterase: x / RBC: x / WBC x   Sq Epi: x / Non Sq Epi: x / Bacteria: x PATIENT:   DEVORAH VALENCIA  [id number]    CHIEF COMPLAINT:  Patient is a 58y old  Male who presents with a chief complaint of Chest pain (13 Dec 2023 22:18)      INTERVAL HISTORY/OVERNIGHT EVENTS:  The patient was seen and examined at bedside. c/o pain to R radial a-line placed yesterday.     REVIEW OF SYSTEMS:  all other ROS negative except as per HPI.     MEDICATIONS:  MEDICATIONS  (STANDING):  aspirin  chewable 81 milliGRAM(s) Oral daily  atorvastatin 80 milliGRAM(s) Oral at bedtime  budesonide  80 MICROgram(s)/formoterol 4.5 MICROgram(s) Inhaler 2 Puff(s) Inhalation two times a day  carvedilol 25 milliGRAM(s) Oral every 12 hours  chlorhexidine 2% Cloths 1 Application(s) Topical daily  clobetasol 0.05% Ointment 1 Application(s) Topical two times a day  heparin  Infusion 1300 Unit(s)/Hr (14 mL/Hr) IV Continuous <Continuous>  hydrALAZINE 50 milliGRAM(s) Oral every 8 hours  insulin glargine Injectable (LANTUS) 12 Unit(s) SubCutaneous at bedtime  insulin lispro (ADMELOG) corrective regimen sliding scale   SubCutaneous at bedtime  insulin lispro (ADMELOG) corrective regimen sliding scale   SubCutaneous three times a day before meals  insulin lispro Injectable (ADMELOG) 9 Unit(s) SubCutaneous three times a day before meals  isosorbide   dinitrate Tablet (ISORDIL) 30 milliGRAM(s) Oral three times a day  polyethylene glycol 3350 17 Gram(s) Oral two times a day  senna 2 Tablet(s) Oral at bedtime    MEDICATIONS  (PRN):  hydrOXYzine hydrochloride 25 milliGRAM(s) Oral two times a day PRN Anxiety      ALLERGIES:  Allergies    penicillins (Unknown)      OBJECTIVE:  ICU Vital Signs Last 24 Hrs  T(C): 36.7 (13 Dec 2023 20:00), Max: 36.7 (13 Dec 2023 13:00)  T(F): 98 (13 Dec 2023 20:00), Max: 98 (13 Dec 2023 13:00)  HR: 68 (14 Dec 2023 07:00) (68 - 85)  BP: 86/54 (13 Dec 2023 17:00) (86/54 - 86/54)  BP(mean): 66 (13 Dec 2023 17:00) (66 - 66)  ABP: --  ABP(mean): --  RR: 17 (14 Dec 2023 07:00) (15 - 23)  SpO2: 99% (14 Dec 2023 07:00) (95% - 100%)    O2 Parameters below as of 14 Dec 2023 07:00  Patient On (Oxygen Delivery Method): room air      CAPILLARY BLOOD GLUCOSE  POCT Blood Glucose.: 112 mg/dL (14 Dec 2023 07:43)  POCT Blood Glucose.: 174 mg/dL (13 Dec 2023 22:02)  POCT Blood Glucose.: 144 mg/dL (13 Dec 2023 16:41)  POCT Blood Glucose.: 166 mg/dL (13 Dec 2023 12:33)  POCT Blood Glucose.: 130 mg/dL (13 Dec 2023 07:49)      I&O's Summary    13 Dec 2023 07:01  -  14 Dec 2023 07:00  --------------------------------------------------------  IN: 1272 mL / OUT: 1450 mL / NET: -178 mL    PHYSICAL EXAMINATION:  General: WN/WD NAD  HEENT: PERRLA, EOMI, moist mucous membranes  Neurology: A&Ox3, nonfocal, MOISE x 4  Respiratory: CTA B/L, normal respiratory effort, no wheezes, crackles, rales  CV: RRR, S1S2, no murmurs, rubs or gallops  Abdominal: Soft, NT, ND +BS, Last BM  Extremities: R groin IABP site well appearing, no swelling or tenderness to palpation. No lower extremity edema, + peripheral pulses  Skin: bilateral upper extremity rash noted, improving    LABS:                          12.1   7.94  )-----------( 152      ( 13 Dec 2023 02:25 )             36.8     12-13    135  |  106  |  34<H>  ----------------------------<  207<H>  4.3   |  19<L>  |  0.99    Ca    10.0      13 Dec 2023 02:25  Phos  3.8     12-13  Mg     1.8     12-13    TPro  6.9  /  Alb  3.9  /  TBili  0.3  /  DBili  x   /  AST  55<H>  /  ALT  162<H>  /  AlkPhos  68  12-13    LIVER FUNCTIONS - ( 13 Dec 2023 02:25 )  Alb: 3.9 g/dL / Pro: 6.9 g/dL / ALK PHOS: 68 U/L / ALT: 162 U/L / AST: 55 U/L / GGT: x           PT/INR - ( 13 Dec 2023 02:25 )   PT: 10.9 sec;   INR: 1.04 ratio         PTT - ( 13 Dec 2023 02:25 )  PTT:67.0 sec      Urinalysis Basic - ( 13 Dec 2023 02:25 )    Color: x / Appearance: x / SG: x / pH: x  Gluc: 207 mg/dL / Ketone: x  / Bili: x / Urobili: x   Blood: x / Protein: x / Nitrite: x   Leuk Esterase: x / RBC: x / WBC x   Sq Epi: x / Non Sq Epi: x / Bacteria: x PATIENT:   DEVORAH VALENCIA  [id number]    CHIEF COMPLAINT:  Patient is a 58y old  Male who presents with a chief complaint of Chest pain (13 Dec 2023 22:18)      INTERVAL HISTORY/OVERNIGHT EVENTS:  The patient was seen and examined at bedside. c/o pain to R radial a-line placed yesterday.     REVIEW OF SYSTEMS:  all other ROS negative except as per HPI.     MEDICATIONS:  MEDICATIONS  (STANDING):  aspirin  chewable 81 milliGRAM(s) Oral daily  atorvastatin 80 milliGRAM(s) Oral at bedtime  budesonide  80 MICROgram(s)/formoterol 4.5 MICROgram(s) Inhaler 2 Puff(s) Inhalation two times a day  carvedilol 25 milliGRAM(s) Oral every 12 hours  chlorhexidine 2% Cloths 1 Application(s) Topical daily  clobetasol 0.05% Ointment 1 Application(s) Topical two times a day  heparin  Infusion 1300 Unit(s)/Hr (14 mL/Hr) IV Continuous <Continuous>  hydrALAZINE 50 milliGRAM(s) Oral every 8 hours  insulin glargine Injectable (LANTUS) 12 Unit(s) SubCutaneous at bedtime  insulin lispro (ADMELOG) corrective regimen sliding scale   SubCutaneous at bedtime  insulin lispro (ADMELOG) corrective regimen sliding scale   SubCutaneous three times a day before meals  insulin lispro Injectable (ADMELOG) 9 Unit(s) SubCutaneous three times a day before meals  isosorbide   dinitrate Tablet (ISORDIL) 30 milliGRAM(s) Oral three times a day  polyethylene glycol 3350 17 Gram(s) Oral two times a day  senna 2 Tablet(s) Oral at bedtime    MEDICATIONS  (PRN):  hydrOXYzine hydrochloride 25 milliGRAM(s) Oral two times a day PRN Anxiety      ALLERGIES:  Allergies    penicillins (Unknown)      OBJECTIVE:  ICU Vital Signs Last 24 Hrs  T(C): 36.7 (13 Dec 2023 20:00), Max: 36.7 (13 Dec 2023 13:00)  T(F): 98 (13 Dec 2023 20:00), Max: 98 (13 Dec 2023 13:00)  HR: 68 (14 Dec 2023 07:00) (68 - 85)  BP: 86/54 (13 Dec 2023 17:00) (86/54 - 86/54)  BP(mean): 66 (13 Dec 2023 17:00) (66 - 66)  ABP: --  ABP(mean): --  RR: 17 (14 Dec 2023 07:00) (15 - 23)  SpO2: 99% (14 Dec 2023 07:00) (95% - 100%)    O2 Parameters below as of 14 Dec 2023 07:00  Patient On (Oxygen Delivery Method): room air      CAPILLARY BLOOD GLUCOSE  POCT Blood Glucose.: 112 mg/dL (14 Dec 2023 07:43)  POCT Blood Glucose.: 174 mg/dL (13 Dec 2023 22:02)  POCT Blood Glucose.: 144 mg/dL (13 Dec 2023 16:41)  POCT Blood Glucose.: 166 mg/dL (13 Dec 2023 12:33)  POCT Blood Glucose.: 130 mg/dL (13 Dec 2023 07:49)      I&O's Summary    13 Dec 2023 07:01  -  14 Dec 2023 07:00  --------------------------------------------------------  IN: 1272 mL / OUT: 1450 mL / NET: -178 mL    PHYSICAL EXAMINATION:  General: WN/WD NAD  HEENT: PERRLA, EOMI, moist mucous membranes  Neurology: A&Ox3, nonfocal, MOISE x 4  Respiratory: CTA B/L, normal respiratory effort, no wheezes, crackles, rales  CV: RRR, S1S2, no murmurs, rubs or gallops  Abdominal: Soft, NT, ND +BS, Last BM  Extremities: R groin IABP site well appearing, no swelling or tenderness to palpation. No lower extremity edema, + peripheral pulses  Skin: bilateral upper extremity rash noted, improving    LABS:                        12.1   7.94  )-----------( 152      ( 13 Dec 2023 02:25 )             36.8     12-13    135  |  106  |  34<H>  ----------------------------<  207<H>  4.3   |  19<L>  |  0.99    Ca    10.0      13 Dec 2023 02:25  Phos  3.8     12-13  Mg     1.8     12-13    TPro  6.9  /  Alb  3.9  /  TBili  0.3  /  DBili  x   /  AST  55<H>  /  ALT  162<H>  /  AlkPhos  68  12-13    LIVER FUNCTIONS - ( 13 Dec 2023 02:25 )  Alb: 3.9 g/dL / Pro: 6.9 g/dL / ALK PHOS: 68 U/L / ALT: 162 U/L / AST: 55 U/L / GGT: x           PT/INR - ( 13 Dec 2023 02:25 )   PT: 10.9 sec;   INR: 1.04 ratio         PTT - ( 13 Dec 2023 02:25 )  PTT:67.0 sec      Urinalysis Basic - ( 13 Dec 2023 02:25 )    Color: x / Appearance: x / SG: x / pH: x  Gluc: 207 mg/dL / Ketone: x  / Bili: x / Urobili: x   Blood: x / Protein: x / Nitrite: x   Leuk Esterase: x / RBC: x / WBC x   Sq Epi: x / Non Sq Epi: x / Bacteria: x PATIENT:   DEVORAH VALENCIA  [id number]    CHIEF COMPLAINT:  Patient is a 58y old  Male who presents with a chief complaint of Chest pain (13 Dec 2023 22:18)      INTERVAL HISTORY/OVERNIGHT EVENTS:  The patient was seen and examined at bedside. c/o pain to R radial a-line placed yesterday.     REVIEW OF SYSTEMS:  all other ROS negative except as per HPI.     MEDICATIONS:  MEDICATIONS  (STANDING):  aspirin  chewable 81 milliGRAM(s) Oral daily  atorvastatin 80 milliGRAM(s) Oral at bedtime  budesonide  80 MICROgram(s)/formoterol 4.5 MICROgram(s) Inhaler 2 Puff(s) Inhalation two times a day  carvedilol 25 milliGRAM(s) Oral every 12 hours  chlorhexidine 2% Cloths 1 Application(s) Topical daily  clobetasol 0.05% Ointment 1 Application(s) Topical two times a day  heparin  Infusion 1300 Unit(s)/Hr (14 mL/Hr) IV Continuous <Continuous>  hydrALAZINE 50 milliGRAM(s) Oral every 8 hours  insulin glargine Injectable (LANTUS) 12 Unit(s) SubCutaneous at bedtime  insulin lispro (ADMELOG) corrective regimen sliding scale   SubCutaneous at bedtime  insulin lispro (ADMELOG) corrective regimen sliding scale   SubCutaneous three times a day before meals  insulin lispro Injectable (ADMELOG) 9 Unit(s) SubCutaneous three times a day before meals  isosorbide   dinitrate Tablet (ISORDIL) 30 milliGRAM(s) Oral three times a day  polyethylene glycol 3350 17 Gram(s) Oral two times a day  senna 2 Tablet(s) Oral at bedtime    MEDICATIONS  (PRN):  hydrOXYzine hydrochloride 25 milliGRAM(s) Oral two times a day PRN Anxiety      ALLERGIES:  Allergies    penicillins (Unknown)      OBJECTIVE:  ICU Vital Signs Last 24 Hrs  T(C): 36.7 (13 Dec 2023 20:00), Max: 36.7 (13 Dec 2023 13:00)  T(F): 98 (13 Dec 2023 20:00), Max: 98 (13 Dec 2023 13:00)  HR: 68 (14 Dec 2023 07:00) (68 - 85)  BP: 86/54 (13 Dec 2023 17:00) (86/54 - 86/54)  BP(mean): 66 (13 Dec 2023 17:00) (66 - 66)  ABP: --  ABP(mean): --  RR: 17 (14 Dec 2023 07:00) (15 - 23)  SpO2: 99% (14 Dec 2023 07:00) (95% - 100%)    O2 Parameters below as of 14 Dec 2023 07:00  Patient On (Oxygen Delivery Method): room air      CAPILLARY BLOOD GLUCOSE  POCT Blood Glucose.: 112 mg/dL (14 Dec 2023 07:43)  POCT Blood Glucose.: 174 mg/dL (13 Dec 2023 22:02)  POCT Blood Glucose.: 144 mg/dL (13 Dec 2023 16:41)  POCT Blood Glucose.: 166 mg/dL (13 Dec 2023 12:33)  POCT Blood Glucose.: 130 mg/dL (13 Dec 2023 07:49)      I&O's Summary    13 Dec 2023 07:01  -  14 Dec 2023 07:00  --------------------------------------------------------  IN: 1272 mL / OUT: 1450 mL / NET: -178 mL    PHYSICAL EXAMINATION:  General: WN/WD NAD  HEENT: PERRLA, EOMI, moist mucous membranes  Neurology: A&Ox3, nonfocal, MOISE x 4  Respiratory: CTA B/L, normal respiratory effort, no wheezes, crackles, rales  CV: RRR, S1S2, no murmurs, rubs or gallops  Abdominal: Soft, NT, ND +BS, Last BM  Extremities: R groin IABP site well appearing, no swelling or tenderness to palpation. No lower extremity edema, + peripheral pulses  Skin: bilateral upper extremity rash noted, improving    LABS:                        12.1   7.94  )-----------( 152      ( 13 Dec 2023 02:25 )             36.8     12-13    135  |  106  |  34<H>  ----------------------------<  207<H>  4.3   |  19<L>  |  0.99    Ca    10.0      13 Dec 2023 02:25  Phos  3.8     12-13  Mg     1.8     12-13    TPro  6.9  /  Alb  3.9  /  TBili  0.3  /  DBili  x   /  AST  55<H>  /  ALT  162<H>  /  AlkPhos  68  12-13    LIVER FUNCTIONS - ( 13 Dec 2023 02:25 )  Alb: 3.9 g/dL / Pro: 6.9 g/dL / ALK PHOS: 68 U/L / ALT: 162 U/L / AST: 55 U/L / GGT: x           PT/INR - ( 13 Dec 2023 02:25 )   PT: 10.9 sec;   INR: 1.04 ratio         PTT - ( 13 Dec 2023 02:25 )  PTT:67.0 sec      Urinalysis Basic - ( 13 Dec 2023 02:25 )    Color: x / Appearance: x / SG: x / pH: x  Gluc: 207 mg/dL / Ketone: x  / Bili: x / Urobili: x   Blood: x / Protein: x / Nitrite: x   Leuk Esterase: x / RBC: x / WBC x   Sq Epi: x / Non Sq Epi: x / Bacteria: x

## 2023-12-14 NOTE — PROGRESS NOTE ADULT - PROBLEM SELECTOR PLAN 1
-Listen UNOS status 2.   - L IABP at 1:1, placed 11/9. Exchange to LFA given high grade bacteremia on 11/20. On AC with Heparin infusion (also for LV thrombus)  - Continue Coreg 25 mg BID hold for MAP <65  - Continue HDZN 50 mg TID and ISDN 30 mg TID, hold for MAP <65  - Off romel given HyperK  - AT evaluation: Accepted for transplant, currently listed UNOS Status 2. ABO A. PRA 0% 11/13. Last Brilinta dose was on 11/13, However, downgraded to status 7 on 12/4 given + blood culture. But now re-upgraded to status 2.   - will send repeat PSAs   - Please keep primary Dr. Banuelos [phone] in the loop per family request. -Listen UNOS status 2.   - L IABP at 1:1, placed 11/9. Exchange to LFA given high grade bacteremia on 11/20. On AC with Heparin infusion (also for LV thrombus)  - Continue Coreg 25 mg BID hold for MAP <65  - Continue HDZN 50 mg TID and ISDN 30 mg TID, hold for MAP <65  - Off romel given HyperK  - AT evaluation: Accepted for transplant, currently listed UNOS Status 2. ABO A. PRA 0% 11/13. Last Brilinta dose was on 11/13, However, downgraded to status 7 on 12/4 given + blood culture. But now re-upgraded to status 2.   - will send repeat PSAs   - Please keep primary Dr. Banuelos [phone] in the loop per family request. -Listen UNOS status 2.   - L IABP at 1:1, placed 11/9. Exchange to LFA given high grade bacteremia on 11/20. On AC with Heparin infusion (also for LV thrombus)  - Continue Coreg 25 mg BID hold for MAP <65  - Continue HDZN 50 mg TID and ISDN 30 mg TID, hold for MAP <65  - Off romel given HyperK  - AT evaluation: Accepted for transplant, currently listed UNOS Status 2. ABO A. PRA 0% 12/12.    However, downgraded to status 7 on 12/4 given + blood culture. But now re-upgraded to status 2.   - Please keep primary Dr. Banuelos [phone] in the loop per family request. -Listen UNOS status 2.   - L IABP at 1:1, placed 11/9. Exchange to LFA given high grade bacteremia on 11/20. On AC with Heparin infusion (also for LV thrombus)  - Continue Coreg 25 mg BID hold for MAP <65  - Continue HDZN 50 mg TID and ISDN 30 mg TID, hold for MAP <65  - Off romel given HyperK  - AT evaluation: Accepted for transplant, currently listed UNOS Status 2. ABO A. PRA 0% 12/12.    However, downgraded to status 7 on 12/4 given + blood culture. But now re-upgraded to status 2.   - Please keep primary Dr. Banuelos [phone] in the loop per family request.

## 2023-12-14 NOTE — PROGRESS NOTE ADULT - ASSESSMENT
59 yo M with HFrEF (LVIDd 6.4 cm, LVEF 32%), CAD s/p PCI (2008), HTN, DMT2 (A1c 8.3%) and CVA s/p TPA (2018), recently treated for PNA who initially presented to [facility] via EMS after syncope reportedly requiring defibrilation. Treated for ACS but left AMA to come to [facility]. On arrival ECG with ST depression in lateral leads. Intubated 11/1 for respiratory failure. Found to have COVID. L/RHC 11/1revealing  of LAD and RCA, elevated filling pressures and CI of 1.5 prompting placement of IABP. Extubated 11/3. IABP was weaned to off 11/7, then the following day on 11/8, developed PMVT cardiac arrest with prompt CPR and defibrillation, started on IV Lidocaine and Amio. IABP ultimately replaced on 11/9 for worsening hypotension. TTE 11/11 revealing LV thrombus.     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 AT evaluation launched 11/10, he is ABO A. He was discussed during TSC on 11/16 and was approved for listing, however was found to be Bacteremic with 11/17 Blc Cx growing mixed cultures Staph epi and Enterococcus faecalis and started on IV Vanco. Repeat cultures from 11/18 reveal NGTD and therefore was cleared by ID for listing.     He appears adequately supported on IABP at 1:1, electrically quiescent on oral Amiodarone. Cr is improving with holding diuretics. Labs otherwise notable for worsening thrombocytopenia. Awaiting suitable donor. GM + rods (Cutibacterium) in blood culture on 11/30 (reported on 12/4), restarted on vancomycin, and status 7, repeat cultures negative x48 hours (12/6),  now status 2 again.         Cardiac Studies  11/21/23 TTE: limited unable to rule out endocarditis, technically difficult study  11/1123 TTE: LVEF 22% (global), LV thrombus, normal RV size and function, mild MR, trace TR  11/1/23  LHC showed pLAD 70 % stenosis in the ostium portion of the segment and 100 % in-stent restenosis and in mRCA, 100 % stenosis.   11/1/23 RHC; RA 23 Vw 24, PA 52/33/40, PCWP 30 Vw 33, AO 85/66/73, PA sat 54.4%, CO/CI (F) 2.96/1.44, IABP was placed during the cath through R common femoral artery.   11/1/23 TTE: LVIDd 6.4cm , LVEF 32%, regional WMA, grade II DD,  TAPSE 1.7cm, mld MR, trace TR,      59 yo M with HFrEF (LVIDd 6.4 cm, LVEF 32%), CAD s/p PCI (2008), HTN, DMT2 (A1c 8.3%) and CVA s/p TPA (2018), recently treated for PNA who initially presented to [facility] via EMS after syncope reportedly requiring defibrilation. Treated for ACS but left AMA to come to [facility]. On arrival ECG with ST depression in lateral leads. Intubated 11/1 for respiratory failure. Found to have COVID. L/RHC 11/1revealing  of LAD and RCA, elevated filling pressures and CI of 1.5 prompting placement of IABP. Extubated 11/3. IABP was weaned to off 11/7, then the following day on 11/8, developed PMVT cardiac arrest with prompt CPR and defibrillation, started on IV Lidocaine and Amio. IABP ultimately replaced on 11/9 for worsening hypotension. TTE 11/11 revealing LV thrombus.     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 AT evaluation launched 11/10, he is ABO A. He was discussed during TSC on 11/16 and was approved for listing, however was found to be Bacteremic with 11/17 Blc Cx growing mixed cultures Staph epi and Enterococcus faecalis and started on IV Vanco. Repeat cultures from 11/18 reveal NGTD and therefore was cleared by ID for listing.     He appears adequately supported on IABP at 1:1, electrically quiescent on oral Amiodarone. Cr is improving with holding diuretics. Labs otherwise notable for worsening thrombocytopenia. Awaiting suitable donor. GM + rods (Cutibacterium) in blood culture on 11/30 (reported on 12/4), restarted on vancomycin, and status 7, repeat cultures negative x48 hours (12/6),  now status 2 again.         Cardiac Studies  11/21/23 TTE: limited unable to rule out endocarditis, technically difficult study  11/1123 TTE: LVEF 22% (global), LV thrombus, normal RV size and function, mild MR, trace TR  11/1/23  LHC showed pLAD 70 % stenosis in the ostium portion of the segment and 100 % in-stent restenosis and in mRCA, 100 % stenosis.   11/1/23 RHC; RA 23 Vw 24, PA 52/33/40, PCWP 30 Vw 33, AO 85/66/73, PA sat 54.4%, CO/CI (F) 2.96/1.44, IABP was placed during the cath through R common femoral artery.   11/1/23 TTE: LVIDd 6.4cm , LVEF 32%, regional WMA, grade II DD,  TAPSE 1.7cm, mld MR, trace TR,

## 2023-12-14 NOTE — PROGRESS NOTE ADULT - ASSESSMENT
====================ASSESSMENT ==============  58 male with HTN, CAD (s/p PCI 2008), HFrEF, CVA 2018, and T2DM presenting with chest pressure and unknown tachycardia that was shocked x1, [facility] 11/1 found to have in-stent restenosis of pLAD and  of RCA with elevated RA and PA pressures and severely decreased. Admitted to CICU for management of cardiogenic shock and ADHF requiring IABP 11/1 -11/7, with hospital course c/b vfib arrest requiring reinsertion of IABP. Not recommended for ATs per HF. Currently listed for transplant status 2.    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 AT evaluation launched 11/10, he is ABO A. He was discussed during TSC on 11/16 and was approved for listing, however was found to be Bacteremic with 11/17 Blc Cx growing mixed cultures Staph epi and Enterococcus faecalis and started on IV Vanco. Repeat cultures from 11/18 reveal NGTD and therefore was cleared by ID for listing.     He appears adequately supported on IABP at 1:1, electrically quiescent on oral Amiodarone. Cr is improving with holding diuretics. Labs otherwise notable for worsening thrombocytopenia. Awaiting suitable donor. Now with GM + rods in blood culture on 11/30 (reported on 12/4), restarted on vancomycin, and status 7, repeat cultures now negative x48 hours (12/6), now status 2 again.       ====================== NEUROLOGY=====================  Anxiety  - No Seroquel/antipsychotics since vfib arrest and prolonged QTC  - Psych Eval, recommended SSRI, but pt. refused   - Psych recommending atarax PRN  - Continue to monitor mental status    PT/Conditioning  - Continue band exercises while on bedrest s/t IABP    ==================== RESPIRATORY======================  Acute Hypoxemic Respiratory Failure  - s/p x2 intubations for cardiogenic pulm edema and the in setting of cardiac arrest, resolved - extubated 11/10  - Currently maintaining >95% sats on room air  - Continue incentive spirometry and monitoring of sp02    Asthma  - c/w albuterol, symbicort and spiriva  - On trelegy at home  - Continue to monitor SpO2 with goal >94%    ====================CARDIOVASCULAR==================  Vfib arrest i/s/o ischemia  - Lido gtt off 11/13  - PO Amio load - total of 5g per EP complete 11/17  - Amio dc'd 12/5 for rising LFTs  - Keep K > 4, Mag > 2.2     Cardiogenic shock requiring IABP (11/1- 11/7, 11/9-)  - Likely 2/2 NSTEMI and ADHF  - 11/1 LHC: pLAD 100 % in-stent restenosis & mRCA, 100 %. PCWP 30. IABP placed.  - 11/1 TTE: LV dilated. EF 32 %. Regional WMAs present, mod (grade 2) LV diastolic dysfunction  - 11/2 TTE: EF 22% and + LV thrombus  - IABP swapped 11/20 to RFA, continue 1:1 support  - Off Milrinone gtt @ 7:30 am 11/11  - James d/c'd due to elevated K levels  - c/w coreg 25 BID for GDMT  - HIT and HAIDER sent (12/3) as per HF   - UNOS status 2 as of 12/6  - 12/5 - reduced hydralazine 100 TID to 75 TID and ISD 40 TID to 30 TID for AL reduction    NSTEMI iso stent re-occlusion of pLAD and 100%  of RCA  - EKG on admission w/ LBBB  - DAPT: c/w ASA, Brilinta d/c'd per transplant w/u  - c/w lipitor 80  - cMR deferred given necessity of IABP  - CT sx not recommending CABG, undergoing AT eval    LV thrombus  - c/w heparin gtt w/ therapeutic PTT    ===================== RENAL =========================  Non-oliguric ARIC, resolved   - Baseline Cr: 1-1.22  - Renal US: no evidence of renal artery stenosis  - Trend BMP, lytes daily, replace as needed  - Continue Strict I/Os, avoid nephrotoxins    Mild Hyponatremia/Hyperkalemia iso ARIC  - Continue fluid restriction   - Urea powder d/c'd per renal  - Trend daily  - Continue monitoring urine output, lytes, SCr/ BUN  - Replete lytes prn with goal K >4 and Mg >2    =============== GASTROINTESTINAL===================  Constipation/ileus, resolved  - regular diet  - bowel reg prn    ===================ENDO====================  Type 2 DM  - A1c 8.3   - Continue lantus, premeal, low ISS  - continue FS    ===================HEMATOLOGIC/ONC ===================  - H/H & plts stable  - Monitor H/H and plts  - VTE PPX: heparin gtt    ==================INFECTIOUS DISEASE================  # Positive blood culture, resolved  - Repeat BCx drawn 11/30 for leukocytosis to 12k - positive on 12/4, G+ rods in anaerobic bottle, suspect contaminant  - Repeat cultures x2 sent 12/4 per transplant ID recs - no growth to date  - Vancomycin by trough (12/4-12/6)  - Final microbial ID: Cutibacterium acnes, per ID this is likely to be a skin contaminant, vanc dc'd.   - Dental eval 12/7 to rule out infectious etiology that would have led to bacteremia, no significant findings on exam.     # Enterococcus faecalis bacteremia, resolved  - BCx 11/17+ for enterococcus faecalis x2, Staph epi x1 (likely contaminant)- pan sensitive   - Urine cx 11/18 + enterococcus faecalis  - BCx 11/18 no growth   - IABP site swapped to RFA 11/20  - s/p Vancomycin 1g q12h (11/18-11/27)  - CT A/P negative for infectious pathology    # COVID, resolved  - Off airborne precautions 11/11    # Pre-transplant ID w/u   - Trend ID recs for serologies   - Colonoscopy 11/17 - normal   - Chest CT 11/17 - improved LLL aeration  - s/p immunizations    ==================DERMATOLOGY================  # Upper Extremity Rash  - Patient developed bilateral upper extremity rash after receiving Hepatitis A & B immunizations on 11/24  - Dermatology consulted 12/5 - not likely to be related to vanco  - Recommend clobetasol 0.05% BID to affected areas   - RVP repeated to rule out viral etiology, negative   ====================ASSESSMENT ==============  58 male with HTN, CAD (s/p PCI 2008), HFrEF, CVA 2018, and T2DM presenting with chest pressure and unknown tachycardia that was shocked x1, [facility] 11/1 found to have in-stent restenosis of pLAD and  of RCA with elevated RA and PA pressures and severely decreased. Admitted to CICU for management of cardiogenic shock and ADHF requiring IABP 11/1 -11/7, with hospital course c/b vfib arrest requiring reinsertion of IABP. Not recommended for ATs per HF. Currently listed for transplant status 2.    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 AT evaluation launched 11/10, he is ABO A. He was discussed during TSC on 11/16 and was approved for listing, however was found to be Bacteremic with 11/17 Blc Cx growing mixed cultures Staph epi and Enterococcus faecalis and started on IV Vanco. Repeat cultures from 11/18 reveal NGTD and therefore was cleared by ID for listing.     He appears adequately supported on IABP at 1:1, electrically quiescent on oral Amiodarone. Cr is improving with holding diuretics. Labs otherwise notable for worsening thrombocytopenia. Awaiting suitable donor. Now with GM + rods in blood culture on 11/30 (reported on 12/4), restarted on vancomycin, and status 7, repeat cultures now negative x48 hours (12/6), now status 2 again.       ====================== NEUROLOGY=====================  Anxiety  - No Seroquel/antipsychotics since vfib arrest and prolonged QTC  - Psych Eval, recommended SSRI, but pt. refused   - Psych recommending atarax PRN  - Continue to monitor mental status    PT/Conditioning  - Continue band exercises while on bedrest s/t IABP    ==================== RESPIRATORY======================  Acute Hypoxemic Respiratory Failure  - s/p x2 intubations for cardiogenic pulm edema and the in setting of cardiac arrest, resolved - extubated 11/10  - Currently maintaining >95% sats on room air  - Continue incentive spirometry and monitoring of sp02    Asthma  - c/w albuterol, symbicort and spiriva  - On trelegy at home  - Continue to monitor SpO2 with goal >94%    ====================CARDIOVASCULAR==================  Vfib arrest i/s/o ischemia  - Lido gtt off 11/13  - PO Amio load - total of 5g per EP complete 11/17  - Amio dc'd 12/5 for rising LFTs  - Keep K > 4, Mag > 2.2     Cardiogenic shock requiring IABP (11/1- 11/7, 11/9-)  - Likely 2/2 NSTEMI and ADHF  - 11/1 LHC: pLAD 100 % in-stent restenosis & mRCA, 100 %. PCWP 30. IABP placed.  - 11/1 TTE: LV dilated. EF 32 %. Regional WMAs present, mod (grade 2) LV diastolic dysfunction  - 11/2 TTE: EF 22% and + LV thrombus  - IABP swapped 11/20 to RFA, continue 1:1 support  - Off Milrinone gtt @ 7:30 am 11/11  - James d/c'd due to elevated K levels  - c/w coreg 25 BID for GDMT  - HIT and HAIDER sent (12/3) as per HF   - UNOS status 2 as of 12/6  - 12/5 - reduced hydralazine 100 TID to 75 TID and ISD 40 TID to 30 TID for AL reduction    NSTEMI iso stent re-occlusion of pLAD and 100%  of RCA  - EKG on admission w/ LBBB  - DAPT: c/w ASA, Brilinta d/c'd per transplant w/u  - c/w lipitor 80  - cMR deferred given necessity of IABP  - CT sx not recommending CABG, undergoing AT eval    LV thrombus  - c/w heparin gtt w/ therapeutic PTT    ===================== RENAL =========================  Non-oliguric ARIC, resolved   - Baseline Cr: 1-1.22  - Renal US: no evidence of renal artery stenosis  - Trend BMP, lytes daily, replace as needed  - Continue Strict I/Os, avoid nephrotoxins    Mild Hyponatremia/Hyperkalemia iso ARIC  - Continue fluid restriction   - Urea powder d/c'd per renal  - Trend daily  - Continue monitoring urine output, lytes, SCr/ BUN  - Replete lytes prn with goal K >4 and Mg >2    =============== GASTROINTESTINAL===================  Constipation/ileus, resolved  - regular diet  - bowel reg prn    ===================ENDO====================  Type 2 DM  - A1c 8.3   - Continue lantus, premeal, low ISS  - continue FS    ===================HEMATOLOGIC/ONC ===================  - H/H & plts stable  - Monitor H/H and plts  - VTE PPX: heparin gtt    ==================INFECTIOUS DISEASE================  # Positive blood culture, resolved  - Repeat BCx drawn 11/30 for leukocytosis to 12k - positive on 12/4, G+ rods in anaerobic bottle, suspect contaminant  - Repeat cultures x2 sent 12/4 per transplant ID recs - no growth to date  - Vancomycin by trough (12/4-12/6)  - Final microbial ID: Cutibacterium acnes, per ID this is likely to be a skin contaminant, vanc dc'd.   - Dental eval 12/7 to rule out infectious etiology that would have led to bacteremia, no significant findings on exam.     # Enterococcus faecalis bacteremia, resolved  - BCx 11/17+ for enterococcus faecalis x2, Staph epi x1 (likely contaminant)- pan sensitive   - Urine cx 11/18 + enterococcus faecalis  - BCx 11/18 no growth   - IABP site swapped to RFA 11/20  - s/p Vancomycin 1g q12h (11/18-11/27)  - CT A/P negative for infectious pathology    # COVID, resolved  - Off airborne precautions 11/11    # Pre-transplant ID w/u   - Trend ID recs for serologies   - Colonoscopy 11/17 - normal   - Chest CT 11/17 - improved LLL aeration  - s/p immunizations    ==================DERMATOLOGY================  # Upper Extremity Rash  - Patient developed bilateral upper extremity rash after receiving Hepatitis A & B immunizations on 11/24  - Dermatology consulted 12/5 - not likely to be related to vanco  - Recommend clobetasol 0.05% BID to affected areas   - RVP repeated to rule out viral etiology, negative   ====================ASSESSMENT ==============  58 male with HTN, CAD (s/p PCI 2008), HFrEF, CVA 2018, and T2DM presenting with chest pressure and unknown tachycardia that was shocked x1, [facility] 11/1 found to have in-stent restenosis of pLAD and  of RCA with elevated RA and PA pressures and severely decreased. Admitted to CICU for management of cardiogenic shock and ADHF requiring IABP 11/1 -11/7, with hospital course c/b vfib arrest requiring reinsertion of IABP. Not recommended for ATs per HF. Currently listed for transplant status 2.    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 AT evaluation launched 11/10, he is ABO A. He was discussed during TSC on 11/16 and was approved for listing, however was found to be Bacteremic with 11/17 Blc Cx growing mixed cultures Staph epi and Enterococcus faecalis and started on IV Vanco. Repeat cultures from 11/18 reveal NGTD and therefore was cleared by ID for listing.     He appears adequately supported on IABP at 1:1, electrically quiescent on oral Amiodarone. Cr is improving with holding diuretics. Labs otherwise notable for worsening thrombocytopenia. Awaiting suitable donor. Now with GM + rods in blood culture on 11/30 (reported on 12/4), restarted on vancomycin, and status 7, repeat cultures now negative x48 hours (12/6), now status 2 again.       ====================== NEUROLOGY=====================  Anxiety  - No Seroquel/antipsychotics since vfib arrest and prolonged QTC  - Psych Eval, recommended SSRI, but pt. refused   - Psych recommending atarax PRN  - Continue to monitor mental status    PT/Conditioning  - Continue band exercises while on bedrest s/t IABP    ==================== RESPIRATORY======================  Acute Hypoxemic Respiratory Failure  - s/p x2 intubations for cardiogenic pulm edema and the in setting of cardiac arrest, resolved - extubated 11/10  - Currently maintaining >95% sats on room air  - Continue incentive spirometry and monitoring of sp02    Asthma  - c/w albuterol, symbicort and spiriva  - On trelegy at home  - Continue to monitor SpO2 with goal >94%    ====================CARDIOVASCULAR==================  Vfib arrest i/s/o ischemia  - Lido gtt off 11/13  - PO Amio load - total of 5g per EP complete 11/17  - Amio dc'd 12/5 for rising LFTs  - Keep K > 4, Mag > 2.2     Cardiogenic shock requiring IABP (11/1- 11/7, 11/9-)  - Likely 2/2 NSTEMI and ADHF  - 11/1 LHC: pLAD 100 % in-stent restenosis & mRCA, 100 %. PCWP 30. IABP placed.  - 11/1 TTE: LV dilated. EF 32 %. Regional WMAs present, mod (grade 2) LV diastolic dysfunction  - 11/2 TTE: EF 22% and + LV thrombus  - IABP swapped 11/20 to RFA, continue 1:1 support - switched sensor to R radial a-line 12/13 due to poor sensing on groin line  - Off Milrinone gtt @ 7:30 am 11/11  - [place] d/c'd due to elevated K levels  - c/w coreg 25 BID for GDMT  - HIT and HAIDER sent (12/3) as per HF   - UNOS status 2 as of 12/6  - 12/5 - reduced hydralazine 100 TID to 75 TID and ISD 40 TID to 30 TID for AL reduction    NSTEMI iso stent re-occlusion of pLAD and 100%  of RCA  - EKG on admission w/ LBBB  - DAPT: c/w ASA, Brilinta d/c'd per transplant w/u  - c/w lipitor 80  - cMR deferred given necessity of IABP  - CT sx not recommending CABG, undergoing AT eval    LV thrombus  - c/w heparin gtt w/ therapeutic PTT    ===================== RENAL =========================  Non-oliguric ARIC, resolved   - Baseline Cr: 1-1.22  - Renal US: no evidence of renal artery stenosis  - Trend BMP, lytes daily, replace as needed  - Continue Strict I/Os, avoid nephrotoxins    Mild Hyponatremia/Hyperkalemia iso ARIC  - Continue fluid restriction   - Urea powder d/c'd per renal  - Trend daily  - Continue monitoring urine output, lytes, SCr/ BUN  - Replete lytes prn with goal K >4 and Mg >2    =============== GASTROINTESTINAL===================  Constipation/ileus, resolved  - regular diet  - bowel reg prn    ===================ENDO====================  Type 2 DM  - A1c 8.3   - Continue lantus, premeal, low ISS  - continue FS    ===================HEMATOLOGIC/ONC ===================  - H/H & plts stable  - Monitor H/H and plts  - VTE PPX: heparin gtt    ==================INFECTIOUS DISEASE================  # Positive blood culture, resolved  - Repeat BCx drawn 11/30 for leukocytosis to 12k - positive on 12/4, G+ rods in anaerobic bottle, suspect contaminant  - Repeat cultures x2 sent 12/4 per transplant ID recs - no growth to date  - Vancomycin by trough (12/4-12/6)  - Final microbial ID: Cutibacterium acnes, per ID this is likely to be a skin contaminant, vanc dc'd.   - Dental eval 12/7 to rule out infectious etiology that would have led to bacteremia, no significant findings on exam.     # Enterococcus faecalis bacteremia, resolved  - BCx 11/17+ for enterococcus faecalis x2, Staph epi x1 (likely contaminant)- pan sensitive   - Urine cx 11/18 + enterococcus faecalis  - BCx 11/18 no growth   - IABP site swapped to RFA 11/20  - s/p Vancomycin 1g q12h (11/18-11/27)  - CT A/P negative for infectious pathology    # COVID, resolved  - Off airborne precautions 11/11    # Pre-transplant ID w/u   - Trend ID recs for serologies   - Colonoscopy 11/17 - normal   - Chest CT 11/17 - improved LLL aeration  - s/p immunizations    ==================DERMATOLOGY================  # Upper Extremity Rash  - Patient developed bilateral upper extremity rash after receiving Hepatitis A & B immunizations on 11/24  - Dermatology consulted 12/5 - not likely to be related to vanco  - Recommend clobetasol 0.05% BID to affected areas   - RVP repeated to rule out viral etiology, negative   ====================ASSESSMENT ==============  58 male with HTN, CAD (s/p PCI 2008), HFrEF, CVA 2018, and T2DM presenting with chest pressure and unknown tachycardia that was shocked x1, [facility] 11/1 found to have in-stent restenosis of pLAD and  of RCA with elevated RA and PA pressures and severely decreased. Admitted to CICU for management of cardiogenic shock and ADHF requiring IABP 11/1 -11/7, with hospital course c/b vfib arrest requiring reinsertion of IABP. Not recommended for ATs per HF. Currently listed for transplant status 2.    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 AT evaluation launched 11/10, he is ABO A. He was discussed during TSC on 11/16 and was approved for listing, however was found to be Bacteremic with 11/17 Blc Cx growing mixed cultures Staph epi and Enterococcus faecalis and started on IV Vanco. Repeat cultures from 11/18 reveal NGTD and therefore was cleared by ID for listing.     He appears adequately supported on IABP at 1:1, electrically quiescent on oral Amiodarone. Cr is improving with holding diuretics. Labs otherwise notable for worsening thrombocytopenia. Awaiting suitable donor. Now with GM + rods in blood culture on 11/30 (reported on 12/4), restarted on vancomycin, and status 7, repeat cultures now negative x48 hours (12/6), now status 2 again.       ====================== NEUROLOGY=====================  Anxiety  - No Seroquel/antipsychotics since vfib arrest and prolonged QTC  - Psych Eval, recommended SSRI, but pt. refused   - Psych recommending atarax PRN  - Continue to monitor mental status    PT/Conditioning  - Continue band exercises while on bedrest s/t IABP    ==================== RESPIRATORY======================  Acute Hypoxemic Respiratory Failure  - s/p x2 intubations for cardiogenic pulm edema and the in setting of cardiac arrest, resolved - extubated 11/10  - Currently maintaining >95% sats on room air  - Continue incentive spirometry and monitoring of sp02    Asthma  - c/w albuterol, symbicort and spiriva  - On trelegy at home  - Continue to monitor SpO2 with goal >94%    ====================CARDIOVASCULAR==================  Vfib arrest i/s/o ischemia  - Lido gtt off 11/13  - PO Amio load - total of 5g per EP complete 11/17  - Amio dc'd 12/5 for rising LFTs  - Keep K > 4, Mag > 2.2     Cardiogenic shock requiring IABP (11/1- 11/7, 11/9-)  - Likely 2/2 NSTEMI and ADHF  - 11/1 LHC: pLAD 100 % in-stent restenosis & mRCA, 100 %. PCWP 30. IABP placed.  - 11/1 TTE: LV dilated. EF 32 %. Regional WMAs present, mod (grade 2) LV diastolic dysfunction  - 11/2 TTE: EF 22% and + LV thrombus  - IABP swapped 11/20 to RFA, continue 1:1 support - switched sensor to R radial a-line 12/13 due to poor sensing on groin line  - Off Milrinone gtt @ 7:30 am 11/11  - [place]aph[place] d/c'd due to elevated K levels  - c/w coreg 25 BID for GDMT  - HIT and HAIDER sent (12/3) as per HF   - UNOS status 2 as of 12/6  - 12/5 - reduced hydralazine 100 TID to 75 TID and ISD 40 TID to 30 TID for AL reduction    NSTEMI iso stent re-occlusion of pLAD and 100%  of RCA  - EKG on admission w/ LBBB  - DAPT: c/w ASA, Brilinta d/c'd per transplant w/u  - c/w lipitor 80  - cMR deferred given necessity of IABP  - CT sx not recommending CABG, undergoing AT eval    LV thrombus  - c/w heparin gtt w/ therapeutic PTT    ===================== RENAL =========================  Non-oliguric ARIC, resolved   - Baseline Cr: 1-1.22  - Renal US: no evidence of renal artery stenosis  - Trend BMP, lytes daily, replace as needed  - Continue Strict I/Os, avoid nephrotoxins    Mild Hyponatremia/Hyperkalemia iso ARIC  - Continue fluid restriction   - Urea powder d/c'd per renal  - Trend daily  - Continue monitoring urine output, lytes, SCr/ BUN  - Replete lytes prn with goal K >4 and Mg >2    =============== GASTROINTESTINAL===================  Constipation/ileus, resolved  - regular diet  - bowel reg prn    ===================ENDO====================  Type 2 DM  - A1c 8.3   - Continue lantus, premeal, low ISS  - continue FS    ===================HEMATOLOGIC/ONC ===================  - H/H & plts stable  - Monitor H/H and plts  - VTE PPX: heparin gtt    ==================INFECTIOUS DISEASE================  # Positive blood culture, resolved  - Repeat BCx drawn 11/30 for leukocytosis to 12k - positive on 12/4, G+ rods in anaerobic bottle, suspect contaminant  - Repeat cultures x2 sent 12/4 per transplant ID recs - no growth to date  - Vancomycin by trough (12/4-12/6)  - Final microbial ID: Cutibacterium acnes, per ID this is likely to be a skin contaminant, vanc dc'd.   - Dental eval 12/7 to rule out infectious etiology that would have led to bacteremia, no significant findings on exam.     # Enterococcus faecalis bacteremia, resolved  - BCx 11/17+ for enterococcus faecalis x2, Staph epi x1 (likely contaminant)- pan sensitive   - Urine cx 11/18 + enterococcus faecalis  - BCx 11/18 no growth   - IABP site swapped to RFA 11/20  - s/p Vancomycin 1g q12h (11/18-11/27)  - CT A/P negative for infectious pathology    # COVID, resolved  - Off airborne precautions 11/11    # Pre-transplant ID w/u   - Trend ID recs for serologies   - Colonoscopy 11/17 - normal   - Chest CT 11/17 - improved LLL aeration  - s/p immunizations    ==================DERMATOLOGY================  # Upper Extremity Rash  - Patient developed bilateral upper extremity rash after receiving Hepatitis A & B immunizations on 11/24  - Dermatology consulted 12/5 - not likely to be related to vanco  - Recommend clobetasol 0.05% BID to affected areas   - RVP repeated to rule out viral etiology, negative   ====================ASSESSMENT ==============  58 male with HTN, CAD (s/p PCI 2008), HFrEF, CVA 2018, and T2DM presenting with chest pressure and unknown tachycardia that was shocked x1, [facility] 11/1 found to have in-stent restenosis of pLAD and  of RCA with elevated RA and PA pressures and severely decreased. Admitted to CICU for management of cardiogenic shock and ADHF requiring IABP 11/1 -11/7, with hospital course c/b vfib arrest requiring reinsertion of IABP. Not recommended for ATs per HF. Currently listed for transplant status 2.    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 AT evaluation launched 11/10, he is ABO A. He was discussed during TSC on 11/16 and was approved for listing, however was found to be Bacteremic with 11/17 Blc Cx growing mixed cultures Staph epi and Enterococcus faecalis and started on IV Vanco. Repeat cultures from 11/18 reveal NGTD and therefore was cleared by ID for listing.     He appears adequately supported on IABP at 1:1, electrically quiescent on oral Amiodarone. Cr is improving with holding diuretics. Labs otherwise notable for worsening thrombocytopenia. Awaiting suitable donor. Now with GM + rods in blood culture on 11/30 (reported on 12/4), restarted on vancomycin, and status 7, repeat cultures now negative x48 hours (12/6), now status 2 again.       ====================== NEUROLOGY=====================  Anxiety  - No Seroquel/antipsychotics since vfib arrest and prolonged QTC  - Psych Eval, recommended SSRI, but pt. refused   - Psych recommending atarax PRN  - Continue to monitor mental status    PT/Conditioning  - Continue band exercises while on bedrest s/t IABP    ==================== RESPIRATORY======================  Acute Hypoxemic Respiratory Failure  - s/p x2 intubations for cardiogenic pulm edema and the in setting of cardiac arrest, resolved - extubated 11/10  - Currently maintaining >95% sats on room air  - Continue incentive spirometry and monitoring of sp02    Asthma  - c/w albuterol, symbicort and spiriva  - On trelegy at home  - Continue to monitor SpO2 with goal >94%    ====================CARDIOVASCULAR==================  Vfib arrest i/s/o ischemia  - Lido gtt off 11/13  - PO Amio load - total of 5g per EP complete 11/17  - Amio dc'd 12/5 for rising LFTs  - Keep K > 4, Mag > 2.2     Cardiogenic shock requiring IABP (11/1- 11/7, 11/9-)  - Likely 2/2 NSTEMI and ADHF  - 11/1 LHC: pLAD 100 % in-stent restenosis & mRCA, 100 %. PCWP 30. IABP placed.  - 11/1 TTE: LV dilated. EF 32 %. Regional WMAs present, mod (grade 2) LV diastolic dysfunction  - 11/2 TTE: EF 22% and + LV thrombus  - IABP swapped 11/20 to RFA, continue 1:1 support - switched sensor to R radial a-line 12/13 due to poor sensing on groin line  - Off Milrinone gtt @ 7:30 am 11/11  - [place] d/c'd due to elevated K levels  - c/w coreg 25 BID for GDMT  - UNOS status 2 as of 12/6  - hydralazine 50 TID and ISD 30 TID for AL reduction    NSTEMI iso stent re-occlusion of pLAD and 100%  of RCA  - EKG on admission w/ LBBB  - DAPT: c/w ASA, Brilinta d/c'd per transplant w/u  - c/w lipitor 80  - cMR deferred given necessity of IABP  - CT sx not recommending CABG, undergoing AT eval    LV thrombus  - c/w heparin gtt w/ therapeutic PTT    ===================== RENAL =========================  Non-oliguric ARIC, resolved   - Baseline Cr: 1-1.22  - Renal US: no evidence of renal artery stenosis  - Trend BMP, lytes daily, replace as needed  - Continue Strict I/Os, avoid nephrotoxins    Mild Hyponatremia/Hyperkalemia iso ARIC  - Continue fluid restriction   - Urea powder d/c'd per renal  - Trend daily  - Continue monitoring urine output, lytes, SCr/ BUN  - Replete lytes prn with goal K >4 and Mg >2    =============== GASTROINTESTINAL===================  Constipation/ileus, resolved  - regular diet  - bowel reg prn    ===================ENDO====================  Type 2 DM  - A1c 8.3   - Continue lantus, premeal, low ISS  - continue FS    ===================HEMATOLOGIC/ONC ===================  - H/H & plts stable  - Monitor H/H and plts  - VTE PPX: heparin gtt    ==================INFECTIOUS DISEASE================  # Positive blood culture, resolved  - Repeat BCx drawn 11/30 for leukocytosis to 12k - positive on 12/4, G+ rods in anaerobic bottle, suspect contaminant  - Repeat cultures x2 sent 12/4 per transplant ID recs - no growth to date  - Vancomycin by trough (12/4-12/6)  - Final microbial ID: Cutibacterium acnes, per ID this is likely to be a skin contaminant, vanc dc'd.   - Dental eval 12/7 to rule out infectious etiology that would have led to bacteremia, no significant findings on exam.     # Enterococcus faecalis bacteremia, resolved  - BCx 11/17+ for enterococcus faecalis x2, Staph epi x1 (likely contaminant)- pan sensitive   - Urine cx 11/18 + enterococcus faecalis  - BCx 11/18 no growth   - IABP site swapped to RFA 11/20  - s/p Vancomycin 1g q12h (11/18-11/27)  - CT A/P negative for infectious pathology    # COVID, resolved  - Off airborne precautions 11/11    # Pre-transplant ID w/u   - Trend ID recs for serologies   - Colonoscopy 11/17 - normal   - Chest CT 11/17 - improved LLL aeration  - s/p immunizations    ==================DERMATOLOGY================  # Upper Extremity Rash  - Patient developed bilateral upper extremity rash after receiving Hepatitis A & B immunizations on 11/24  - Dermatology consulted 12/5 - not likely to be related to vanco  - Recommend clobetasol 0.05% BID to affected areas   - RVP repeated to rule out viral etiology, negative   ====================ASSESSMENT ==============  58 male with HTN, CAD (s/p PCI 2008), HFrEF, CVA 2018, and T2DM presenting with chest pressure and unknown tachycardia that was shocked x1, [facility] 11/1 found to have in-stent restenosis of pLAD and  of RCA with elevated RA and PA pressures and severely decreased. Admitted to CICU for management of cardiogenic shock and ADHF requiring IABP 11/1 -11/7, with hospital course c/b vfib arrest requiring reinsertion of IABP. Not recommended for ATs per HF. Currently listed for transplant status 2.    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 AT evaluation launched 11/10, he is ABO A. He was discussed during TSC on 11/16 and was approved for listing, however was found to be Bacteremic with 11/17 Blc Cx growing mixed cultures Staph epi and Enterococcus faecalis and started on IV Vanco. Repeat cultures from 11/18 reveal NGTD and therefore was cleared by ID for listing.     He appears adequately supported on IABP at 1:1, electrically quiescent on oral Amiodarone. Cr is improving with holding diuretics. Labs otherwise notable for worsening thrombocytopenia. Awaiting suitable donor. Now with GM + rods in blood culture on 11/30 (reported on 12/4), restarted on vancomycin, and status 7, repeat cultures now negative x48 hours (12/6), now status 2 again.       ====================== NEUROLOGY=====================  Anxiety  - No Seroquel/antipsychotics since vfib arrest and prolonged QTC  - Psych Eval, recommended SSRI, but pt. refused   - Psych recommending atarax PRN  - Continue to monitor mental status    PT/Conditioning  - Continue band exercises while on bedrest s/t IABP    ==================== RESPIRATORY======================  Acute Hypoxemic Respiratory Failure  - s/p x2 intubations for cardiogenic pulm edema and the in setting of cardiac arrest, resolved - extubated 11/10  - Currently maintaining >95% sats on room air  - Continue incentive spirometry and monitoring of sp02    Asthma  - c/w albuterol, symbicort and spiriva  - On trelegy at home  - Continue to monitor SpO2 with goal >94%    ====================CARDIOVASCULAR==================  Vfib arrest i/s/o ischemia  - Lido gtt off 11/13  - PO Amio load - total of 5g per EP complete 11/17  - Amio dc'd 12/5 for rising LFTs  - Keep K > 4, Mag > 2.2     Cardiogenic shock requiring IABP (11/1- 11/7, 11/9-)  - Likely 2/2 NSTEMI and ADHF  - 11/1 LHC: pLAD 100 % in-stent restenosis & mRCA, 100 %. PCWP 30. IABP placed.  - 11/1 TTE: LV dilated. EF 32 %. Regional WMAs present, mod (grade 2) LV diastolic dysfunction  - 11/2 TTE: EF 22% and + LV thrombus  - IABP swapped 11/20 to RFA, continue 1:1 support - switched sensor to R radial a-line 12/13 due to poor sensing on groin line  - Off Milrinone gtt @ 7:30 am 11/11  - [place] d/c'd due to elevated K levels  - c/w coreg 25 BID for GDMT  - UNOS status 2 as of 12/6  - hydralazine 50 TID and ISD 30 TID for AL reduction    NSTEMI iso stent re-occlusion of pLAD and 100%  of RCA  - EKG on admission w/ LBBB  - DAPT: c/w ASA, Brilinta d/c'd per transplant w/u  - c/w lipitor 80  - cMR deferred given necessity of IABP  - CT sx not recommending CABG, undergoing AT eval    LV thrombus  - c/w heparin gtt w/ therapeutic PTT    ===================== RENAL =========================  Non-oliguric ARIC, resolved   - Baseline Cr: 1-1.22  - Renal US: no evidence of renal artery stenosis  - Trend BMP, lytes daily, replace as needed  - Continue Strict I/Os, avoid nephrotoxins    Mild Hyponatremia/Hyperkalemia iso ARIC  - Continue fluid restriction   - Urea powder d/c'd per renal  - Trend daily  - Continue monitoring urine output, lytes, SCr/ BUN  - Replete lytes prn with goal K >4 and Mg >2    =============== GASTROINTESTINAL===================  Constipation/ileus, resolved  - regular diet  - bowel reg prn    ===================ENDO====================  Type 2 DM  - A1c 8.3   - Continue lantus, premeal, low ISS  - continue FS    ===================HEMATOLOGIC/ONC ===================  - H/H & plts stable  - Monitor H/H and plts  - VTE PPX: heparin gtt    ==================INFECTIOUS DISEASE================  # Positive blood culture, resolved  - Repeat BCx drawn 11/30 for leukocytosis to 12k - positive on 12/4, G+ rods in anaerobic bottle, suspect contaminant  - Repeat cultures x2 sent 12/4 per transplant ID recs - no growth to date  - Vancomycin by trough (12/4-12/6)  - Final microbial ID: Cutibacterium acnes, per ID this is likely to be a skin contaminant, vanc dc'd.   - Dental eval 12/7 to rule out infectious etiology that would have led to bacteremia, no significant findings on exam.     # Enterococcus faecalis bacteremia, resolved  - BCx 11/17+ for enterococcus faecalis x2, Staph epi x1 (likely contaminant)- pan sensitive   - Urine cx 11/18 + enterococcus faecalis  - BCx 11/18 no growth   - IABP site swapped to RFA 11/20  - s/p Vancomycin 1g q12h (11/18-11/27)  - CT A/P negative for infectious pathology    # COVID, resolved  - Off airborne precautions 11/11    # Pre-transplant ID w/u   - Trend ID recs for serologies   - Colonoscopy 11/17 - normal   - Chest CT 11/17 - improved LLL aeration  - s/p immunizations    ==================DERMATOLOGY================  # Upper Extremity Rash  - Patient developed bilateral upper extremity rash after receiving Hepatitis A & B immunizations on 11/24  - Dermatology consulted 12/5 - not likely to be related to vanco  - Recommend clobetasol 0.05% BID to affected areas   - RVP repeated to rule out viral etiology, negative

## 2023-12-15 LAB
ALBUMIN SERPL ELPH-MCNC: 3.9 G/DL — SIGNIFICANT CHANGE UP (ref 3.3–5)
ALBUMIN SERPL ELPH-MCNC: 3.9 G/DL — SIGNIFICANT CHANGE UP (ref 3.3–5)
ALP SERPL-CCNC: 65 U/L — SIGNIFICANT CHANGE UP (ref 40–120)
ALP SERPL-CCNC: 65 U/L — SIGNIFICANT CHANGE UP (ref 40–120)
ALT FLD-CCNC: 117 U/L — HIGH (ref 10–45)
ALT FLD-CCNC: 117 U/L — HIGH (ref 10–45)
ANION GAP SERPL CALC-SCNC: 12 MMOL/L — SIGNIFICANT CHANGE UP (ref 5–17)
ANION GAP SERPL CALC-SCNC: 12 MMOL/L — SIGNIFICANT CHANGE UP (ref 5–17)
APTT BLD: 61.9 SEC — HIGH (ref 24.5–35.6)
APTT BLD: 61.9 SEC — HIGH (ref 24.5–35.6)
AST SERPL-CCNC: 29 U/L — SIGNIFICANT CHANGE UP (ref 10–40)
AST SERPL-CCNC: 29 U/L — SIGNIFICANT CHANGE UP (ref 10–40)
BASOPHILS # BLD AUTO: 0.06 K/UL — SIGNIFICANT CHANGE UP (ref 0–0.2)
BASOPHILS # BLD AUTO: 0.06 K/UL — SIGNIFICANT CHANGE UP (ref 0–0.2)
BASOPHILS NFR BLD AUTO: 0.8 % — SIGNIFICANT CHANGE UP (ref 0–2)
BASOPHILS NFR BLD AUTO: 0.8 % — SIGNIFICANT CHANGE UP (ref 0–2)
BILIRUB SERPL-MCNC: 0.2 MG/DL — SIGNIFICANT CHANGE UP (ref 0.2–1.2)
BILIRUB SERPL-MCNC: 0.2 MG/DL — SIGNIFICANT CHANGE UP (ref 0.2–1.2)
BUN SERPL-MCNC: 30 MG/DL — HIGH (ref 7–23)
BUN SERPL-MCNC: 30 MG/DL — HIGH (ref 7–23)
CALCIUM SERPL-MCNC: 9.9 MG/DL — SIGNIFICANT CHANGE UP (ref 8.4–10.5)
CALCIUM SERPL-MCNC: 9.9 MG/DL — SIGNIFICANT CHANGE UP (ref 8.4–10.5)
CHLORIDE SERPL-SCNC: 104 MMOL/L — SIGNIFICANT CHANGE UP (ref 96–108)
CHLORIDE SERPL-SCNC: 104 MMOL/L — SIGNIFICANT CHANGE UP (ref 96–108)
CO2 SERPL-SCNC: 19 MMOL/L — LOW (ref 22–31)
CO2 SERPL-SCNC: 19 MMOL/L — LOW (ref 22–31)
CREAT SERPL-MCNC: 1.05 MG/DL — SIGNIFICANT CHANGE UP (ref 0.5–1.3)
CREAT SERPL-MCNC: 1.05 MG/DL — SIGNIFICANT CHANGE UP (ref 0.5–1.3)
EGFR: 82 ML/MIN/1.73M2 — SIGNIFICANT CHANGE UP
EGFR: 82 ML/MIN/1.73M2 — SIGNIFICANT CHANGE UP
EOSINOPHIL # BLD AUTO: 0.64 K/UL — HIGH (ref 0–0.5)
EOSINOPHIL # BLD AUTO: 0.64 K/UL — HIGH (ref 0–0.5)
EOSINOPHIL NFR BLD AUTO: 8.4 % — HIGH (ref 0–6)
EOSINOPHIL NFR BLD AUTO: 8.4 % — HIGH (ref 0–6)
GLUCOSE BLDC GLUCOMTR-MCNC: 102 MG/DL — HIGH (ref 70–99)
GLUCOSE BLDC GLUCOMTR-MCNC: 102 MG/DL — HIGH (ref 70–99)
GLUCOSE BLDC GLUCOMTR-MCNC: 120 MG/DL — HIGH (ref 70–99)
GLUCOSE BLDC GLUCOMTR-MCNC: 120 MG/DL — HIGH (ref 70–99)
GLUCOSE BLDC GLUCOMTR-MCNC: 128 MG/DL — HIGH (ref 70–99)
GLUCOSE BLDC GLUCOMTR-MCNC: 128 MG/DL — HIGH (ref 70–99)
GLUCOSE BLDC GLUCOMTR-MCNC: 302 MG/DL — HIGH (ref 70–99)
GLUCOSE BLDC GLUCOMTR-MCNC: 302 MG/DL — HIGH (ref 70–99)
GLUCOSE SERPL-MCNC: 129 MG/DL — HIGH (ref 70–99)
GLUCOSE SERPL-MCNC: 129 MG/DL — HIGH (ref 70–99)
HCT VFR BLD CALC: 38.3 % — LOW (ref 39–50)
HCT VFR BLD CALC: 38.3 % — LOW (ref 39–50)
HGB BLD-MCNC: 12.5 G/DL — LOW (ref 13–17)
HGB BLD-MCNC: 12.5 G/DL — LOW (ref 13–17)
IMM GRANULOCYTES NFR BLD AUTO: 0.8 % — SIGNIFICANT CHANGE UP (ref 0–0.9)
IMM GRANULOCYTES NFR BLD AUTO: 0.8 % — SIGNIFICANT CHANGE UP (ref 0–0.9)
INR BLD: 1.09 RATIO — SIGNIFICANT CHANGE UP (ref 0.85–1.18)
INR BLD: 1.09 RATIO — SIGNIFICANT CHANGE UP (ref 0.85–1.18)
LYMPHOCYTES # BLD AUTO: 1.68 K/UL — SIGNIFICANT CHANGE UP (ref 1–3.3)
LYMPHOCYTES # BLD AUTO: 1.68 K/UL — SIGNIFICANT CHANGE UP (ref 1–3.3)
LYMPHOCYTES # BLD AUTO: 22 % — SIGNIFICANT CHANGE UP (ref 13–44)
LYMPHOCYTES # BLD AUTO: 22 % — SIGNIFICANT CHANGE UP (ref 13–44)
MAGNESIUM SERPL-MCNC: 1.8 MG/DL — SIGNIFICANT CHANGE UP (ref 1.6–2.6)
MAGNESIUM SERPL-MCNC: 1.8 MG/DL — SIGNIFICANT CHANGE UP (ref 1.6–2.6)
MCHC RBC-ENTMCNC: 29.3 PG — SIGNIFICANT CHANGE UP (ref 27–34)
MCHC RBC-ENTMCNC: 29.3 PG — SIGNIFICANT CHANGE UP (ref 27–34)
MCHC RBC-ENTMCNC: 32.6 GM/DL — SIGNIFICANT CHANGE UP (ref 32–36)
MCHC RBC-ENTMCNC: 32.6 GM/DL — SIGNIFICANT CHANGE UP (ref 32–36)
MCV RBC AUTO: 89.9 FL — SIGNIFICANT CHANGE UP (ref 80–100)
MCV RBC AUTO: 89.9 FL — SIGNIFICANT CHANGE UP (ref 80–100)
MONOCYTES # BLD AUTO: 0.65 K/UL — SIGNIFICANT CHANGE UP (ref 0–0.9)
MONOCYTES # BLD AUTO: 0.65 K/UL — SIGNIFICANT CHANGE UP (ref 0–0.9)
MONOCYTES NFR BLD AUTO: 8.5 % — SIGNIFICANT CHANGE UP (ref 2–14)
MONOCYTES NFR BLD AUTO: 8.5 % — SIGNIFICANT CHANGE UP (ref 2–14)
NEUTROPHILS # BLD AUTO: 4.54 K/UL — SIGNIFICANT CHANGE UP (ref 1.8–7.4)
NEUTROPHILS # BLD AUTO: 4.54 K/UL — SIGNIFICANT CHANGE UP (ref 1.8–7.4)
NEUTROPHILS NFR BLD AUTO: 59.5 % — SIGNIFICANT CHANGE UP (ref 43–77)
NEUTROPHILS NFR BLD AUTO: 59.5 % — SIGNIFICANT CHANGE UP (ref 43–77)
NRBC # BLD: 0 /100 WBCS — SIGNIFICANT CHANGE UP (ref 0–0)
NRBC # BLD: 0 /100 WBCS — SIGNIFICANT CHANGE UP (ref 0–0)
PHOSPHATE SERPL-MCNC: 3.4 MG/DL — SIGNIFICANT CHANGE UP (ref 2.5–4.5)
PHOSPHATE SERPL-MCNC: 3.4 MG/DL — SIGNIFICANT CHANGE UP (ref 2.5–4.5)
PLATELET # BLD AUTO: 163 K/UL — SIGNIFICANT CHANGE UP (ref 150–400)
PLATELET # BLD AUTO: 163 K/UL — SIGNIFICANT CHANGE UP (ref 150–400)
POTASSIUM SERPL-MCNC: 4.3 MMOL/L — SIGNIFICANT CHANGE UP (ref 3.5–5.3)
POTASSIUM SERPL-MCNC: 4.3 MMOL/L — SIGNIFICANT CHANGE UP (ref 3.5–5.3)
POTASSIUM SERPL-SCNC: 4.3 MMOL/L — SIGNIFICANT CHANGE UP (ref 3.5–5.3)
POTASSIUM SERPL-SCNC: 4.3 MMOL/L — SIGNIFICANT CHANGE UP (ref 3.5–5.3)
PROT SERPL-MCNC: 7 G/DL — SIGNIFICANT CHANGE UP (ref 6–8.3)
PROT SERPL-MCNC: 7 G/DL — SIGNIFICANT CHANGE UP (ref 6–8.3)
PROTHROM AB SERPL-ACNC: 11.4 SEC — SIGNIFICANT CHANGE UP (ref 9.5–13)
PROTHROM AB SERPL-ACNC: 11.4 SEC — SIGNIFICANT CHANGE UP (ref 9.5–13)
RBC # BLD: 4.26 M/UL — SIGNIFICANT CHANGE UP (ref 4.2–5.8)
RBC # BLD: 4.26 M/UL — SIGNIFICANT CHANGE UP (ref 4.2–5.8)
RBC # FLD: 15.7 % — HIGH (ref 10.3–14.5)
RBC # FLD: 15.7 % — HIGH (ref 10.3–14.5)
SODIUM SERPL-SCNC: 135 MMOL/L — SIGNIFICANT CHANGE UP (ref 135–145)
SODIUM SERPL-SCNC: 135 MMOL/L — SIGNIFICANT CHANGE UP (ref 135–145)
WBC # BLD: 7.63 K/UL — SIGNIFICANT CHANGE UP (ref 3.8–10.5)
WBC # BLD: 7.63 K/UL — SIGNIFICANT CHANGE UP (ref 3.8–10.5)
WBC # FLD AUTO: 7.63 K/UL — SIGNIFICANT CHANGE UP (ref 3.8–10.5)
WBC # FLD AUTO: 7.63 K/UL — SIGNIFICANT CHANGE UP (ref 3.8–10.5)

## 2023-12-15 PROCEDURE — 99292 CRITICAL CARE ADDL 30 MIN: CPT | Mod: 25

## 2023-12-15 PROCEDURE — 71045 X-RAY EXAM CHEST 1 VIEW: CPT | Mod: 26

## 2023-12-15 PROCEDURE — 99292 CRITICAL CARE ADDL 30 MIN: CPT | Mod: GC

## 2023-12-15 PROCEDURE — 99291 CRITICAL CARE FIRST HOUR: CPT | Mod: GC

## 2023-12-15 PROCEDURE — 99232 SBSQ HOSP IP/OBS MODERATE 35: CPT

## 2023-12-15 PROCEDURE — 93010 ELECTROCARDIOGRAM REPORT: CPT

## 2023-12-15 PROCEDURE — 99291 CRITICAL CARE FIRST HOUR: CPT | Mod: 25

## 2023-12-15 RX ORDER — MAGNESIUM SULFATE 500 MG/ML
2 VIAL (ML) INJECTION ONCE
Refills: 0 | Status: COMPLETED | OUTPATIENT
Start: 2023-12-15 | End: 2023-12-15

## 2023-12-15 RX ADMIN — BUDESONIDE AND FORMOTEROL FUMARATE DIHYDRATE 2 PUFF(S): 160; 4.5 AEROSOL RESPIRATORY (INHALATION) at 09:07

## 2023-12-15 RX ADMIN — CARVEDILOL PHOSPHATE 25 MILLIGRAM(S): 80 CAPSULE, EXTENDED RELEASE ORAL at 06:01

## 2023-12-15 RX ADMIN — HEPARIN SODIUM 14 UNIT(S)/HR: 5000 INJECTION INTRAVENOUS; SUBCUTANEOUS at 06:05

## 2023-12-15 RX ADMIN — Medication 50 MILLIGRAM(S): at 06:47

## 2023-12-15 RX ADMIN — Medication 50 MILLIGRAM(S): at 21:25

## 2023-12-15 RX ADMIN — ISOSORBIDE DINITRATE 30 MILLIGRAM(S): 5 TABLET ORAL at 06:00

## 2023-12-15 RX ADMIN — ISOSORBIDE DINITRATE 30 MILLIGRAM(S): 5 TABLET ORAL at 11:05

## 2023-12-15 RX ADMIN — ATORVASTATIN CALCIUM 80 MILLIGRAM(S): 80 TABLET, FILM COATED ORAL at 21:25

## 2023-12-15 RX ADMIN — CHLORHEXIDINE GLUCONATE 1 APPLICATION(S): 213 SOLUTION TOPICAL at 23:06

## 2023-12-15 RX ADMIN — Medication 9 UNIT(S): at 09:25

## 2023-12-15 RX ADMIN — SENNA PLUS 2 TABLET(S): 8.6 TABLET ORAL at 21:24

## 2023-12-15 RX ADMIN — Medication 4: at 12:35

## 2023-12-15 RX ADMIN — Medication 50 MILLIGRAM(S): at 13:18

## 2023-12-15 RX ADMIN — Medication 9 UNIT(S): at 12:58

## 2023-12-15 RX ADMIN — ISOSORBIDE DINITRATE 30 MILLIGRAM(S): 5 TABLET ORAL at 15:56

## 2023-12-15 RX ADMIN — Medication 25 GRAM(S): at 06:00

## 2023-12-15 RX ADMIN — Medication 81 MILLIGRAM(S): at 11:05

## 2023-12-15 RX ADMIN — INSULIN GLARGINE 12 UNIT(S): 100 INJECTION, SOLUTION SUBCUTANEOUS at 21:32

## 2023-12-15 RX ADMIN — HEPARIN SODIUM 14 UNIT(S)/HR: 5000 INJECTION INTRAVENOUS; SUBCUTANEOUS at 14:31

## 2023-12-15 RX ADMIN — BUDESONIDE AND FORMOTEROL FUMARATE DIHYDRATE 2 PUFF(S): 160; 4.5 AEROSOL RESPIRATORY (INHALATION) at 20:08

## 2023-12-15 RX ADMIN — CARVEDILOL PHOSPHATE 25 MILLIGRAM(S): 80 CAPSULE, EXTENDED RELEASE ORAL at 17:18

## 2023-12-15 NOTE — PROGRESS NOTE ADULT - SUBJECTIVE AND OBJECTIVE BOX
Follow Up:      Interval History:    REVIEW OF SYSTEMS  [  ] ROS unobtainable because:    [  ] All other systems negative except as noted below    Constitutional:  [ ] fever [ ] chills  [ ] weight loss  [ ] weakness  Skin:  [ ] rash [ ] phlebitis	  Eyes: [ ] icterus [ ] pain  [ ] discharge	  ENMT: [ ] sore throat  [ ] thrush [ ] ulcers [ ] exudates  Respiratory: [ ] dyspnea [ ] hemoptysis [ ] cough [ ] sputum	  Cardiovascular:  [ ] chest pain [ ] palpitations [ ] edema	  Gastrointestinal:  [ ] nausea [ ] vomiting [ ] diarrhea [ ] constipation [ ] pain	  Genitourinary:  [ ] dysuria [ ] frequency [ ] hematuria [ ] discharge [ ] flank pain  [ ] incontinence  Musculoskeletal:  [ ] myalgias [ ] arthralgias [ ] arthritis  [ ] back pain  Neurological:  [ ] headache [ ] seizures  [ ] confusion/altered mental status    Allergies  penicillins (Unknown)        ANTIMICROBIALS:      OTHER MEDS:  MEDICATIONS  (STANDING):  aspirin  chewable 81 daily  atorvastatin 80 at bedtime  budesonide  80 MICROgram(s)/formoterol 4.5 MICROgram(s) Inhaler 2 two times a day  carvedilol 25 every 12 hours  heparin  Infusion 1300 <Continuous>  hydrALAZINE 50 every 8 hours  hydrOXYzine hydrochloride 25 two times a day PRN  insulin glargine Injectable (LANTUS) 12 at bedtime  insulin lispro (ADMELOG) corrective regimen sliding scale  at bedtime  insulin lispro (ADMELOG) corrective regimen sliding scale  three times a day before meals  insulin lispro Injectable (ADMELOG) 9 three times a day before meals  isosorbide   dinitrate Tablet (ISORDIL) 30 three times a day  polyethylene glycol 3350 17 two times a day  senna 2 at bedtime      Vital Signs Last 24 Hrs  T(C): 36.7 (15 Dec 2023 13:00), Max: 36.9 (14 Dec 2023 20:00)  T(F): 98 (15 Dec 2023 13:00), Max: 98.4 (14 Dec 2023 20:00)  HR: 82 (15 Dec 2023 16:00) (70 - 91)  BP: 108/61 (15 Dec 2023 08:45) (108/61 - 108/61)  BP(mean): 77 (15 Dec 2023 08:45) (77 - 77)  RR: 21 (15 Dec 2023 16:00) (13 - 29)  SpO2: 97% (15 Dec 2023 15:00) (95% - 99%)    Parameters below as of 15 Dec 2023 16:00  Patient On (Oxygen Delivery Method): room air        PHYSICAL EXAMINATION:  General: Alert and Awake, NAD  HEENT: PERRL, EOMI  Neck: Supple  Cardiac: RRR, No M/R/G  Resp: CTAB, No Wh/Rh/Ra  Abdomen: NBS, NT/ND, No HSM, No rigidity or guarding  MSK: No LE edema. No Calf tenderness  : No holt  Skin: No rashes or lesions. Skin is warm and dry to the touch.   Neuro: Alert and Awake. CN 2-12 Grossly intact. Moves all four extremities spontaneously.  Psych: Calm, Pleasant, Cooperative                          12.5   7.63  )-----------( 163      ( 15 Dec 2023 04:03 )             38.3       12-15    135  |  104  |  30<H>  ----------------------------<  129<H>  4.3   |  19<L>  |  1.05    Ca    9.9      15 Dec 2023 04:03  Phos  3.4     12-15  Mg     1.8     12-15    TPro  7.0  /  Alb  3.9  /  TBili  0.2  /  DBili  x   /  AST  29  /  ALT  117<H>  /  AlkPhos  65  12-15      Urinalysis Basic - ( 15 Dec 2023 04:03 )    Color: x / Appearance: x / SG: x / pH: x  Gluc: 129 mg/dL / Ketone: x  / Bili: x / Urobili: x   Blood: x / Protein: x / Nitrite: x   Leuk Esterase: x / RBC: x / WBC x   Sq Epi: x / Non Sq Epi: x / Bacteria: x        MICROBIOLOGY:  v  .Blood Blood-Peripheral  12-04-23   No growth at 5 days  --  --      .Blood Blood-Peripheral  12-04-23   No growth at 5 days  --  --      .Blood Blood  11-30-23   Growth in anaerobic bottle: Cutibacterium acnes  Direct identification is available within approximately 3-5  hours either by Blood Panel Multiplexed PCR or Direct  MALDI-TOF. Details: [email or web address]  --  Cutibacterium acnes  Blood Culture PCR      .Blood Blood-Venous  11-29-23   No growth at 5 days  --  --      .Blood Blood-Peripheral  11-18-23   No growth at 5 days  --  --      .Blood Blood-Peripheral  11-18-23   No growth at 5 days  --  --      Clean Catch Clean Catch (Midstream)  11-18-23   10,000 - 49,000 CFU/mL Enterococcus faecalis  <10,000 CFU/ml Normal Urogenital kaitlynn present  --  Enterococcus faecalis      .Blood Blood  11-17-23   Growth in aerobic and anaerobic bottles: Enterococcus faecalis  See previous culture [id number]  Growth in aerobic bottle: Staphylococcus epidermidis  --  Staphylococcus epidermidis      .Blood Blood  11-17-23   Growth in aerobic bottle: Enterococcus faecalis  Growth in anaerobic bottle: Staphylococcus epidermidis "Susceptibilities  not performed"  Direct identification is available within approximately 3-5  hours either by Blood Panel Multiplexed PCR or Direct  MALDI-TOF. Details: [email or web address]  --  Blood Culture PCR  Blood Culture PCR  Enterococcus faecalis        CMV IgG Antibody: 4.20 U/mL (11-10-23 @ 17:29)  Toxoplasma IgG Screen: <3.0 IU/mL (11-10-23 @ 17:29)  HIV-1 RNA Quantitative, Viral Load Log: NOT DET. lg /mL (11-10-23 @ 17:06)          RADIOLOGY:    <The imaging below has been reviewed and visualized by me independently. Findings as detailed in report below> Follow Up:      Interval History:    REVIEW OF SYSTEMS  [  ] ROS unobtainable because:    [  ] All other systems negative except as noted below    Constitutional:  [ ] fever [ ] chills  [ ] weight loss  [ ] weakness  Skin:  [ ] rash [ ] phlebitis	  Eyes: [ ] icterus [ ] pain  [ ] discharge	  ENMT: [ ] sore throat  [ ] thrush [ ] ulcers [ ] exudates  Respiratory: [ ] dyspnea [ ] hemoptysis [ ] cough [ ] sputum	  Cardiovascular:  [ ] chest pain [ ] palpitations [ ] edema	  Gastrointestinal:  [ ] nausea [ ] vomiting [ ] diarrhea [ ] constipation [ ] pain	  Genitourinary:  [ ] dysuria [ ] frequency [ ] hematuria [ ] discharge [ ] flank pain  [ ] incontinence  Musculoskeletal:  [ ] myalgias [ ] arthralgias [ ] arthritis  [ ] back pain  Neurological:  [ ] headache [ ] seizures  [ ] confusion/altered mental status    Allergies  penicillins (Unknown)        ANTIMICROBIALS:      OTHER MEDS:  MEDICATIONS  (STANDING):  aspirin  chewable 81 daily  atorvastatin 80 at bedtime  budesonide  80 MICROgram(s)/formoterol 4.5 MICROgram(s) Inhaler 2 two times a day  carvedilol 25 every 12 hours  heparin  Infusion 1300 <Continuous>  hydrALAZINE 50 every 8 hours  hydrOXYzine hydrochloride 25 two times a day PRN  insulin glargine Injectable (LANTUS) 12 at bedtime  insulin lispro (ADMELOG) corrective regimen sliding scale  at bedtime  insulin lispro (ADMELOG) corrective regimen sliding scale  three times a day before meals  insulin lispro Injectable (ADMELOG) 9 three times a day before meals  isosorbide   dinitrate Tablet (ISORDIL) 30 three times a day  polyethylene glycol 3350 17 two times a day  senna 2 at bedtime      Vital Signs Last 24 Hrs  T(C): 36.7 (15 Dec 2023 13:00), Max: 36.9 (14 Dec 2023 20:00)  T(F): 98 (15 Dec 2023 13:00), Max: 98.4 (14 Dec 2023 20:00)  HR: 82 (15 Dec 2023 16:00) (70 - 91)  BP: 108/61 (15 Dec 2023 08:45) (108/61 - 108/61)  BP(mean): 77 (15 Dec 2023 08:45) (77 - 77)  RR: 21 (15 Dec 2023 16:00) (13 - 29)  SpO2: 97% (15 Dec 2023 15:00) (95% - 99%)    Parameters below as of 15 Dec 2023 16:00  Patient On (Oxygen Delivery Method): room air        PHYSICAL EXAMINATION:  General: Alert and Awake, NAD  HEENT: PERRL, EOMI  Neck: Supple  Cardiac: RRR, No M/R/G  Resp: CTAB, No Wh/Rh/Ra  Abdomen: NBS, NT/ND, No HSM, No rigidity or guarding  MSK: No LE edema. No Calf tenderness  : No holt  Skin: No rashes or lesions. Skin is warm and dry to the touch.   Neuro: Alert and Awake. CN 2-12 Grossly intact. Moves all four extremities spontaneously.  Psych: Calm, Pleasant, Cooperative                          12.5   7.63  )-----------( 163      ( 15 Dec 2023 04:03 )             38.3       12-15    135  |  104  |  30<H>  ----------------------------<  129<H>  4.3   |  19<L>  |  1.05    Ca    9.9      15 Dec 2023 04:03  Phos  3.4     12-15  Mg     1.8     12-15    TPro  7.0  /  Alb  3.9  /  TBili  0.2  /  DBili  x   /  AST  29  /  ALT  117<H>  /  AlkPhos  65  12-15      Urinalysis Basic - ( 15 Dec 2023 04:03 )    Color: x / Appearance: x / SG: x / pH: x  Gluc: 129 mg/dL / Ketone: x  / Bili: x / Urobili: x   Blood: x / Protein: x / Nitrite: x   Leuk Esterase: x / RBC: x / WBC x   Sq Epi: x / Non Sq Epi: x / Bacteria: x        MICROBIOLOGY:  v  .Blood Blood-Peripheral  12-04-23   No growth at 5 days  --  --      .Blood Blood-Peripheral  12-04-23   No growth at 5 days  --  --      .Blood Blood  11-30-23   Growth in anaerobic bottle: Cutibacterium acnes  Direct identification is available within approximately 3-5  hours either by Blood Panel Multiplexed PCR or Direct  MALDI-TOF. Details: [email or web address]  --  Cutibacterium acnes  Blood Culture PCR      .Blood Blood-Venous  11-29-23   No growth at 5 days  --  --      .Blood Blood-Peripheral  11-18-23   No growth at 5 days  --  --      .Blood Blood-Peripheral  11-18-23   No growth at 5 days  --  --      Clean Catch Clean Catch (Midstream)  11-18-23   10,000 - 49,000 CFU/mL Enterococcus faecalis  <10,000 CFU/ml Normal Urogenital kaitlynn present  --  Enterococcus faecalis      .Blood Blood  11-17-23   Growth in aerobic and anaerobic bottles: Enterococcus faecalis  See previous culture [id number]  Growth in aerobic bottle: Staphylococcus epidermidis  --  Staphylococcus epidermidis      .Blood Blood  11-17-23   Growth in aerobic bottle: Enterococcus faecalis  Growth in anaerobic bottle: Staphylococcus epidermidis "Susceptibilities  not performed"  Direct identification is available within approximately 3-5  hours either by Blood Panel Multiplexed PCR or Direct  MALDI-TOF. Details: [email or web address]  --  Blood Culture PCR  Blood Culture PCR  Enterococcus faecalis        CMV IgG Antibody: 4.20 U/mL (11-10-23 @ 17:29)  Toxoplasma IgG Screen: <3.0 IU/mL (11-10-23 @ 17:29)  HIV-1 RNA Quantitative, Viral Load Log: NOT DET. lg /mL (11-10-23 @ 17:06)          RADIOLOGY:    <The imaging below has been reviewed and visualized by me independently. Findings as detailed in report below> Follow Up:  Pre-OHT    Interval History: afebrile. rash improving.     REVIEW OF SYSTEMS  [  ] ROS unobtainable because:    [ x ] All other systems negative except as noted below    Constitutional:  [ ] fever [ ] chills  [ ] weight loss  [ ] weakness  Skin:  [ ] rash [ ] phlebitis	  Eyes: [ ] icterus [ ] pain  [ ] discharge	  ENMT: [ ] sore throat  [ ] thrush [ ] ulcers [ ] exudates  Respiratory: [ ] dyspnea [ ] hemoptysis [ ] cough [ ] sputum	  Cardiovascular:  [ ] chest pain [ ] palpitations [ ] edema	  Gastrointestinal:  [ ] nausea [ ] vomiting [ ] diarrhea [ ] constipation [ ] pain	  Genitourinary:  [ ] dysuria [ ] frequency [ ] hematuria [ ] discharge [ ] flank pain  [ ] incontinence  Musculoskeletal:  [ ] myalgias [ ] arthralgias [ ] arthritis  [ ] back pain  Neurological:  [ ] headache [ ] seizures  [ ] confusion/altered mental status    Allergies  penicillins (Unknown)        ANTIMICROBIALS:      OTHER MEDS:  MEDICATIONS  (STANDING):  aspirin  chewable 81 daily  atorvastatin 80 at bedtime  budesonide  80 MICROgram(s)/formoterol 4.5 MICROgram(s) Inhaler 2 two times a day  carvedilol 25 every 12 hours  heparin  Infusion 1300 <Continuous>  hydrALAZINE 50 every 8 hours  hydrOXYzine hydrochloride 25 two times a day PRN  insulin glargine Injectable (LANTUS) 12 at bedtime  insulin lispro (ADMELOG) corrective regimen sliding scale  at bedtime  insulin lispro (ADMELOG) corrective regimen sliding scale  three times a day before meals  insulin lispro Injectable (ADMELOG) 9 three times a day before meals  isosorbide   dinitrate Tablet (ISORDIL) 30 three times a day  polyethylene glycol 3350 17 two times a day  senna 2 at bedtime      Vital Signs Last 24 Hrs  T(C): 36.7 (15 Dec 2023 13:00), Max: 36.9 (14 Dec 2023 20:00)  T(F): 98 (15 Dec 2023 13:00), Max: 98.4 (14 Dec 2023 20:00)  HR: 82 (15 Dec 2023 16:00) (70 - 91)  BP: 108/61 (15 Dec 2023 08:45) (108/61 - 108/61)  BP(mean): 77 (15 Dec 2023 08:45) (77 - 77)  RR: 21 (15 Dec 2023 16:00) (13 - 29)  SpO2: 97% (15 Dec 2023 15:00) (95% - 99%)    Parameters below as of 15 Dec 2023 16:00  Patient On (Oxygen Delivery Method): room air    PHYSICAL EXAMINATION:  General: Alert and Awake, NAD  Cardiac: RRR, No M/R/G  Resp: CTAB, No Wh/Rh/Ra  Abdomen: NBS, NT/ND, No HSM, No rigidity or guarding  MSK: No LE edema. No Calf tenderness  Vasc: R Balloon Pump (No surrounding erythema, drainage or tenderness to palpation)  Skin: Maculopapular rash on UE, UE and to lesser degree the chest. Skin is warm and dry to the touch.   Neuro: Alert and Awake. CN 2-12 Grossly intact. Moves all four extremities spontaneously.  Psych: Calm, Pleasant, Cooperative                        12.5   7.63  )-----------( 163      ( 15 Dec 2023 04:03 )             38.3       12-15    135  |  104  |  30<H>  ----------------------------<  129<H>  4.3   |  19<L>  |  1.05    Ca    9.9      15 Dec 2023 04:03  Phos  3.4     12-15  Mg     1.8     12-15    TPro  7.0  /  Alb  3.9  /  TBili  0.2  /  DBili  x   /  AST  29  /  ALT  117<H>  /  AlkPhos  65  12-15      Urinalysis Basic - ( 15 Dec 2023 04:03 )    Color: x / Appearance: x / SG: x / pH: x  Gluc: 129 mg/dL / Ketone: x  / Bili: x / Urobili: x   Blood: x / Protein: x / Nitrite: x   Leuk Esterase: x / RBC: x / WBC x   Sq Epi: x / Non Sq Epi: x / Bacteria: x        MICROBIOLOGY:  v  .Blood Blood-Peripheral  12-04-23   No growth at 5 days  --  --      .Blood Blood-Peripheral  12-04-23   No growth at 5 days  --  --      .Blood Blood  11-30-23   Growth in anaerobic bottle: Cutibacterium acnes  Direct identification is available within approximately 3-5  hours either by Blood Panel Multiplexed PCR or Direct  MALDI-TOF. Details: [email or web address]  --  Cutibacterium acnes  Blood Culture PCR      .Blood Blood-Venous  11-29-23   No growth at 5 days  --  --      .Blood Blood-Peripheral  11-18-23   No growth at 5 days  --  --      .Blood Blood-Peripheral  11-18-23   No growth at 5 days  --  --      Clean Catch Clean Catch (Midstream)  11-18-23   10,000 - 49,000 CFU/mL Enterococcus faecalis  <10,000 CFU/ml Normal Urogenital kaitlynn present  --  Enterococcus faecalis      .Blood Blood  11-17-23   Growth in aerobic and anaerobic bottles: Enterococcus faecalis  See previous culture [id number]  Growth in aerobic bottle: Staphylococcus epidermidis  --  Staphylococcus epidermidis      .Blood Blood  11-17-23   Growth in aerobic bottle: Enterococcus faecalis  Growth in anaerobic bottle: Staphylococcus epidermidis "Susceptibilities  not performed"  Direct identification is available within approximately 3-5  hours either by Blood Panel Multiplexed PCR or Direct  MALDI-TOF. Details: [email or web address]  --  Blood Culture PCR  Blood Culture PCR  Enterococcus faecalis        CMV IgG Antibody: 4.20 U/mL (11-10-23 @ 17:29)  Toxoplasma IgG Screen: <3.0 IU/mL (11-10-23 @ 17:29)  HIV-1 RNA Quantitative, Viral Load Log: NOT DET. lg /mL (11-10-23 @ 17:06)          RADIOLOGY:    <The imaging below has been reviewed and visualized by me independently. Findings as detailed in report below>    < from: Xray Chest 1 View- PORTABLE-Routine (Xray Chest 1 View- PORTABLE-Routine .) (12.14.23 @ 09:07) >  IMPRESSION:  Intra-aortic balloon pump 6 cm below the top the aortic arch at the level   of the left mainstem bronchus.    < end of copied text >   Follow Up:  Pre-OHT    Interval History: afebrile. rash improving.     REVIEW OF SYSTEMS  [  ] ROS unobtainable because:    [ x ] All other systems negative except as noted below    Constitutional:  [ ] fever [ ] chills  [ ] weight loss  [ ] weakness  Skin:  [ ] rash [ ] phlebitis	  Eyes: [ ] icterus [ ] pain  [ ] discharge	  ENMT: [ ] sore throat  [ ] thrush [ ] ulcers [ ] exudates  Respiratory: [ ] dyspnea [ ] hemoptysis [ ] cough [ ] sputum	  Cardiovascular:  [ ] chest pain [ ] palpitations [ ] edema	  Gastrointestinal:  [ ] nausea [ ] vomiting [ ] diarrhea [ ] constipation [ ] pain	  Genitourinary:  [ ] dysuria [ ] frequency [ ] hematuria [ ] discharge [ ] flank pain  [ ] incontinence  Musculoskeletal:  [ ] myalgias [ ] arthralgias [ ] arthritis  [ ] back pain  Neurological:  [ ] headache [ ] seizures  [ ] confusion/altered mental status    Allergies  penicillins (Unknown)        ANTIMICROBIALS:      OTHER MEDS:  MEDICATIONS  (STANDING):  aspirin  chewable 81 daily  atorvastatin 80 at bedtime  budesonide  80 MICROgram(s)/formoterol 4.5 MICROgram(s) Inhaler 2 two times a day  carvedilol 25 every 12 hours  heparin  Infusion 1300 <Continuous>  hydrALAZINE 50 every 8 hours  hydrOXYzine hydrochloride 25 two times a day PRN  insulin glargine Injectable (LANTUS) 12 at bedtime  insulin lispro (ADMELOG) corrective regimen sliding scale  at bedtime  insulin lispro (ADMELOG) corrective regimen sliding scale  three times a day before meals  insulin lispro Injectable (ADMELOG) 9 three times a day before meals  isosorbide   dinitrate Tablet (ISORDIL) 30 three times a day  polyethylene glycol 3350 17 two times a day  senna 2 at bedtime      Vital Signs Last 24 Hrs  T(C): 36.7 (15 Dec 2023 13:00), Max: 36.9 (14 Dec 2023 20:00)  T(F): 98 (15 Dec 2023 13:00), Max: 98.4 (14 Dec 2023 20:00)  HR: 82 (15 Dec 2023 16:00) (70 - 91)  BP: 108/61 (15 Dec 2023 08:45) (108/61 - 108/61)  BP(mean): 77 (15 Dec 2023 08:45) (77 - 77)  RR: 21 (15 Dec 2023 16:00) (13 - 29)  SpO2: 97% (15 Dec 2023 15:00) (95% - 99%)    Parameters below as of 15 Dec 2023 16:00  Patient On (Oxygen Delivery Method): room air    PHYSICAL EXAMINATION:  General: Alert and Awake, NAD  Cardiac: RRR, No M/R/G  Resp: CTAB, No Wh/Rh/Ra  Abdomen: NBS, NT/ND, No HSM, No rigidity or guarding  MSK: No LE edema. No Calf tenderness  Vasc: R Balloon Pump (No surrounding erythema, drainage or tenderness to palpation)  Skin: Maculopapular rash on UE, UE and to lesser degree the chest. Skin is warm and dry to the touch.   Neuro: Alert and Awake. CN 2-12 Grossly intact. Moves all four extremities spontaneously.  Psych: Calm, Pleasant, Cooperative                        12.5   7.63  )-----------( 163      ( 15 Dec 2023 04:03 )             38.3       12-15    135  |  104  |  30<H>  ----------------------------<  129<H>  4.3   |  19<L>  |  1.05    Ca    9.9      15 Dec 2023 04:03  Phos  3.4     12-15  Mg     1.8     12-15    TPro  7.0  /  Alb  3.9  /  TBili  0.2  /  DBili  x   /  AST  29  /  ALT  117<H>  /  AlkPhos  65  12-15      Urinalysis Basic - ( 15 Dec 2023 04:03 )    Color: x / Appearance: x / SG: x / pH: x  Gluc: 129 mg/dL / Ketone: x  / Bili: x / Urobili: x   Blood: x / Protein: x / Nitrite: x   Leuk Esterase: x / RBC: x / WBC x   Sq Epi: x / Non Sq Epi: x / Bacteria: x        MICROBIOLOGY:  v  .Blood Blood-Peripheral  12-04-23   No growth at 5 days  --  --      .Blood Blood-Peripheral  12-04-23   No growth at 5 days  --  --      .Blood Blood  11-30-23   Growth in anaerobic bottle: Cutibacterium acnes  Direct identification is available within approximately 3-5  hours either by Blood Panel Multiplexed PCR or Direct  MALDI-TOF. Details: [email or web address]  --  Cutibacterium acnes  Blood Culture PCR      .Blood Blood-Venous  11-29-23   No growth at 5 days  --  --      .Blood Blood-Peripheral  11-18-23   No growth at 5 days  --  --      .Blood Blood-Peripheral  11-18-23   No growth at 5 days  --  --      Clean Catch Clean Catch (Midstream)  11-18-23   10,000 - 49,000 CFU/mL Enterococcus faecalis  <10,000 CFU/ml Normal Urogenital kaitlynn present  --  Enterococcus faecalis      .Blood Blood  11-17-23   Growth in aerobic and anaerobic bottles: Enterococcus faecalis  See previous culture [id number]  Growth in aerobic bottle: Staphylococcus epidermidis  --  Staphylococcus epidermidis      .Blood Blood  11-17-23   Growth in aerobic bottle: Enterococcus faecalis  Growth in anaerobic bottle: Staphylococcus epidermidis "Susceptibilities  not performed"  Direct identification is available within approximately 3-5  hours either by Blood Panel Multiplexed PCR or Direct  MALDI-TOF. Details: [email or web address]  --  Blood Culture PCR  Blood Culture PCR  Enterococcus faecalis        CMV IgG Antibody: 4.20 U/mL (11-10-23 @ 17:29)  Toxoplasma IgG Screen: <3.0 IU/mL (11-10-23 @ 17:29)  HIV-1 RNA Quantitative, Viral Load Log: NOT DET. lg /mL (11-10-23 @ 17:06)          RADIOLOGY:    <The imaging below has been reviewed and visualized by me independently. Findings as detailed in report below>    < from: Xray Chest 1 View- PORTABLE-Routine (Xray Chest 1 View- PORTABLE-Routine .) (12.14.23 @ 09:07) >  IMPRESSION:  Intra-aortic balloon pump 6 cm below the top the aortic arch at the level   of the left mainstem bronchus.    < end of copied text >

## 2023-12-15 NOTE — PROGRESS NOTE ADULT - SUBJECTIVE AND OBJECTIVE BOX
PATIENT:  DEVORAH VALENCIA    [id number]    58M with HFrEF (LVIDd 6.4 cm, LVEF 32%), CAD s/p PCI (2008), HTN, DMT2 (A1c 8.3%) and CVA s/p TPA (2018), recently treated for PNA who initially presented to [facility] via EMS after syncope reportedly requiring defibrilation. Treated for ACS but left AMA to come to [facility]. On arrival ECG with ST depression in lateral leads. Intubated 11/1 for respiratory failure. Found to have COVID. L/RHC 11/1revealing  of LAD and RCA, elevated filling pressures and CI of 1.5 prompting placement of IABP. Extubated 11/3. IABP was weaned to off 11/7, then the following day on 11/8, developed PMVT cardiac arrest with prompt CPR and defibrillation, started on IV Lidocaine and Amio. IABP ultimately replaced on 11/9 for worsening hypotension. TTE 11/11 revealing LV thrombus. IABP replaced 11/20. Currently UNOS status 2 awaiting transplant.     INTERVAL HISTORY/OVERNIGHT EVENTS:  The patient was seen and examined at bedside. No acute events overnight. Refused AM CXR, agreeable to having XR done during daytime. Stable on 1:1 IABP.     REVIEW OF SYSTEMS:  all other ROS negative except as per HPI.     MEDICATIONS:  MEDICATIONS  (STANDING):  aspirin  chewable 81 milliGRAM(s) Oral daily  atorvastatin 80 milliGRAM(s) Oral at bedtime  budesonide  80 MICROgram(s)/formoterol 4.5 MICROgram(s) Inhaler 2 Puff(s) Inhalation two times a day  carvedilol 25 milliGRAM(s) Oral every 12 hours  chlorhexidine 2% Cloths 1 Application(s) Topical daily  heparin  Infusion 1300 Unit(s)/Hr (14 mL/Hr) IV Continuous <Continuous>  hydrALAZINE 50 milliGRAM(s) Oral every 8 hours  insulin glargine Injectable (LANTUS) 12 Unit(s) SubCutaneous at bedtime  insulin lispro (ADMELOG) corrective regimen sliding scale   SubCutaneous at bedtime  insulin lispro (ADMELOG) corrective regimen sliding scale   SubCutaneous three times a day before meals  insulin lispro Injectable (ADMELOG) 9 Unit(s) SubCutaneous three times a day before meals  isosorbide   dinitrate Tablet (ISORDIL) 30 milliGRAM(s) Oral three times a day  polyethylene glycol 3350 17 Gram(s) Oral two times a day  senna 2 Tablet(s) Oral at bedtime    MEDICATIONS  (PRN):  hydrOXYzine hydrochloride 25 milliGRAM(s) Oral two times a day PRN Anxiety      ALLERGIES:  Allergies    penicillins (Unknown)      OBJECTIVE:  ICU Vital Signs Last 24 Hrs  T(C): 36.9 (14 Dec 2023 20:00), Max: 36.9 (14 Dec 2023 20:00)  T(F): 98.4 (14 Dec 2023 20:00), Max: 98.4 (14 Dec 2023 20:00)  HR: 70 (15 Dec 2023 07:00) (67 - 91)  BP: --  BP(mean): --  ABP: --  ABP(mean): --  RR: 19 (15 Dec 2023 07:00) (13 - 29)  SpO2: 98% (15 Dec 2023 06:00) (96% - 99%)    O2 Parameters below as of 15 Dec 2023 06:00  Patient On (Oxygen Delivery Method): room air    CAPILLARY BLOOD GLUCOSE    POCT Blood Glucose.: 232 mg/dL (14 Dec 2023 22:43)  POCT Blood Glucose.: 133 mg/dL (14 Dec 2023 16:48)  POCT Blood Glucose.: 99 mg/dL (14 Dec 2023 12:21)      I&O's Summary    14 Dec 2023 07:01  -  15 Dec 2023 07:00  --------------------------------------------------------  IN: 696 mL / OUT: 2375 mL / NET: -1679 mL      PHYSICAL EXAMINATION:  General: WN/WD NAD  HEENT: PERRLA, EOMI, moist mucous membranes  Neurology: A&Ox3, nonfocal, MOISE x 4  Respiratory: CTA B/L, normal respiratory effort, no wheezes, crackles, rales  CV: RRR, S1S2, no murmurs, rubs or gallops  Abdominal: Soft, NT, ND +BS, Last BM  Extremities: R groin IABP site well appearing, no swelling or tenderness to palpation. No lower extremity edema, + peripheral pulses  Skin: bilateral upper extremity rash noted, improving    LABS:                        12.5   7.63  )-----------( 163      ( 15 Dec 2023 04:03 )             38.3     12-15    135  |  104  |  30<H>  ----------------------------<  129<H>  4.3   |  19<L>  |  1.05    Ca    9.9      15 Dec 2023 04:03  Phos  3.4     12-15  Mg     1.8     12-15    TPro  7.0  /  Alb  3.9  /  TBili  0.2  /  DBili  x   /  AST  29  /  ALT  117<H>  /  AlkPhos  65  12-15    LIVER FUNCTIONS - ( 15 Dec 2023 04:03 )  Alb: 3.9 g/dL / Pro: 7.0 g/dL / ALK PHOS: 65 U/L / ALT: 117 U/L / AST: 29 U/L / GGT: x           PT/INR - ( 15 Dec 2023 04:03 )   PT: 11.4 sec;   INR: 1.09 ratio         PTT - ( 15 Dec 2023 04:03 )  PTT:61.9 sec      Urinalysis Basic - ( 15 Dec 2023 04:03 )    Color: x / Appearance: x / SG: x / pH: x  Gluc: 129 mg/dL / Ketone: x  / Bili: x / Urobili: x   Blood: x / Protein: x / Nitrite: x   Leuk Esterase: x / RBC: x / WBC x   Sq Epi: x / Non Sq Epi: x / Bacteria: x      IMAGING: CXR shows IABP in good position PATIENT:  DEVORAH VALENCIA    [id number]    58M with HFrEF (LVIDd 6.4 cm, LVEF 32%), CAD s/p PCI (2008), HTN, DMT2 (A1c 8.3%) and CVA s/p TPA (2018), recently treated for PNA who initially presented to [facility] via EMS after syncope reportedly requiring defibrilation. Treated for ACS but left AMA to come to [facility]. On arrival ECG with ST depression in lateral leads. Intubated 11/1 for respiratory failure. Found to have COVID. L/RHC 11/1revealing  of LAD and RCA, elevated filling pressures and CI of 1.5 prompting placement of IABP. Extubated 11/3. IABP was weaned to off 11/7, then the following day on 11/8, developed PMVT cardiac arrest with prompt CPR and defibrillation, started on IV Lidocaine and Amio. IABP ultimately replaced on 11/9 for worsening hypotension. TTE 11/11 revealing LV thrombus. IABP replaced 11/20. Currently UNOS status 2 awaiting transplant.     INTERVAL HISTORY/OVERNIGHT EVENTS:  The patient was seen and examined at bedside. No acute events overnight. Refused AM CXR, agreeable to having XR done during daytime. Stable on 1:1 IABP.     REVIEW OF SYSTEMS:  all other ROS negative except as per HPI.     MEDICATIONS:  MEDICATIONS  (STANDING):  aspirin  chewable 81 milliGRAM(s) Oral daily  atorvastatin 80 milliGRAM(s) Oral at bedtime  budesonide  80 MICROgram(s)/formoterol 4.5 MICROgram(s) Inhaler 2 Puff(s) Inhalation two times a day  carvedilol 25 milliGRAM(s) Oral every 12 hours  chlorhexidine 2% Cloths 1 Application(s) Topical daily  heparin  Infusion 1300 Unit(s)/Hr (14 mL/Hr) IV Continuous <Continuous>  hydrALAZINE 50 milliGRAM(s) Oral every 8 hours  insulin glargine Injectable (LANTUS) 12 Unit(s) SubCutaneous at bedtime  insulin lispro (ADMELOG) corrective regimen sliding scale   SubCutaneous at bedtime  insulin lispro (ADMELOG) corrective regimen sliding scale   SubCutaneous three times a day before meals  insulin lispro Injectable (ADMELOG) 9 Unit(s) SubCutaneous three times a day before meals  isosorbide   dinitrate Tablet (ISORDIL) 30 milliGRAM(s) Oral three times a day  polyethylene glycol 3350 17 Gram(s) Oral two times a day  senna 2 Tablet(s) Oral at bedtime    MEDICATIONS  (PRN):  hydrOXYzine hydrochloride 25 milliGRAM(s) Oral two times a day PRN Anxiety      ALLERGIES:  Allergies    penicillins (Unknown)      OBJECTIVE:  ICU Vital Signs Last 24 Hrs  T(C): 36.9 (14 Dec 2023 20:00), Max: 36.9 (14 Dec 2023 20:00)  T(F): 98.4 (14 Dec 2023 20:00), Max: 98.4 (14 Dec 2023 20:00)  HR: 70 (15 Dec 2023 07:00) (67 - 91)  BP: --  BP(mean): --  ABP: --  ABP(mean): --  RR: 19 (15 Dec 2023 07:00) (13 - 29)  SpO2: 98% (15 Dec 2023 06:00) (96% - 99%)    O2 Parameters below as of 15 Dec 2023 06:00  Patient On (Oxygen Delivery Method): room air    CAPILLARY BLOOD GLUCOSE    POCT Blood Glucose.: 232 mg/dL (14 Dec 2023 22:43)  POCT Blood Glucose.: 133 mg/dL (14 Dec 2023 16:48)  POCT Blood Glucose.: 99 mg/dL (14 Dec 2023 12:21)      I&O's Summary    14 Dec 2023 07:01  -  15 Dec 2023 07:00  --------------------------------------------------------  IN: 696 mL / OUT: 2375 mL / NET: -1679 mL      PHYSICAL EXAMINATION:  General: WN/WD NAD  HEENT: PERRLA, EOMI, moist mucous membranes  Neurology: A&Ox3, nonfocal, MOISE x 4  Respiratory: CTA B/L, normal respiratory effort, no wheezes, crackles, rales  CV: RRR, S1S2, no murmurs, rubs or gallops  Abdominal: Soft, NT, ND +BS, Last BM  Extremities: R groin IABP site well appearing, no swelling or tenderness to palpation. No lower extremity edema, + peripheral pulses  Skin: bilateral upper extremity rash noted, improving    LABS:                        12.5   7.63  )-----------( 163      ( 15 Dec 2023 04:03 )             38.3     12-15    135  |  104  |  30<H>  ----------------------------<  129<H>  4.3   |  19<L>  |  1.05    Ca    9.9      15 Dec 2023 04:03  Phos  3.4     12-15  Mg     1.8     12-15    TPro  7.0  /  Alb  3.9  /  TBili  0.2  /  DBili  x   /  AST  29  /  ALT  117<H>  /  AlkPhos  65  12-15    LIVER FUNCTIONS - ( 15 Dec 2023 04:03 )  Alb: 3.9 g/dL / Pro: 7.0 g/dL / ALK PHOS: 65 U/L / ALT: 117 U/L / AST: 29 U/L / GGT: x           PT/INR - ( 15 Dec 2023 04:03 )   PT: 11.4 sec;   INR: 1.09 ratio         PTT - ( 15 Dec 2023 04:03 )  PTT:61.9 sec      Urinalysis Basic - ( 15 Dec 2023 04:03 )    Color: x / Appearance: x / SG: x / pH: x  Gluc: 129 mg/dL / Ketone: x  / Bili: x / Urobili: x   Blood: x / Protein: x / Nitrite: x   Leuk Esterase: x / RBC: x / WBC x   Sq Epi: x / Non Sq Epi: x / Bacteria: x      IMAGING: CXR shows IABP in good position PATIENT:  DEVORAH VALENCIA    [id number]    58M with HFrEF (LVIDd 6.4 cm, LVEF 32%), CAD s/p PCI (2008), HTN, DMT2 (A1c 8.3%) and CVA s/p TPA (2018), recently treated for PNA who initially presented to [facility] via EMS after syncope reportedly requiring defibrilation. Treated for ACS but left AMA to come to [facility]. On arrival ECG with ST depression in lateral leads. Intubated 11/1 for respiratory failure. Found to have COVID. L/RHC 11/1revealing  of LAD and RCA, elevated filling pressures and CI of 1.5 prompting placement of IABP. Extubated 11/3. IABP was weaned to off 11/7, then the following day on 11/8, developed PMVT cardiac arrest with prompt CPR and defibrillation, started on IV Lidocaine and Amio. IABP ultimately replaced on 11/9 for worsening hypotension. TTE 11/11 revealing LV thrombus. IABP replaced 11/20. Currently UNOS status 2 awaiting transplant.     INTERVAL HISTORY/OVERNIGHT EVENTS:  The patient was seen and examined at bedside. No acute events overnight.  Stable on 1:1 IABP. Last BM 12/11, states he will attempt BM today.     REVIEW OF SYSTEMS:  all other ROS negative except as per HPI.     MEDICATIONS:  MEDICATIONS  (STANDING):  aspirin  chewable 81 milliGRAM(s) Oral daily  atorvastatin 80 milliGRAM(s) Oral at bedtime  budesonide  80 MICROgram(s)/formoterol 4.5 MICROgram(s) Inhaler 2 Puff(s) Inhalation two times a day  carvedilol 25 milliGRAM(s) Oral every 12 hours  chlorhexidine 2% Cloths 1 Application(s) Topical daily  heparin  Infusion 1300 Unit(s)/Hr (14 mL/Hr) IV Continuous <Continuous>  hydrALAZINE 50 milliGRAM(s) Oral every 8 hours  insulin glargine Injectable (LANTUS) 12 Unit(s) SubCutaneous at bedtime  insulin lispro (ADMELOG) corrective regimen sliding scale   SubCutaneous at bedtime  insulin lispro (ADMELOG) corrective regimen sliding scale   SubCutaneous three times a day before meals  insulin lispro Injectable (ADMELOG) 9 Unit(s) SubCutaneous three times a day before meals  isosorbide   dinitrate Tablet (ISORDIL) 30 milliGRAM(s) Oral three times a day  polyethylene glycol 3350 17 Gram(s) Oral two times a day  senna 2 Tablet(s) Oral at bedtime    MEDICATIONS  (PRN):  hydrOXYzine hydrochloride 25 milliGRAM(s) Oral two times a day PRN Anxiety      ALLERGIES:  Allergies    penicillins (Unknown)      OBJECTIVE:  ICU Vital Signs Last 24 Hrs  T(C): 36.9 (14 Dec 2023 20:00), Max: 36.9 (14 Dec 2023 20:00)  T(F): 98.4 (14 Dec 2023 20:00), Max: 98.4 (14 Dec 2023 20:00)  HR: 70 (15 Dec 2023 07:00) (67 - 91)  BP: --  BP(mean): --  ABP: --  ABP(mean): --  RR: 19 (15 Dec 2023 07:00) (13 - 29)  SpO2: 98% (15 Dec 2023 06:00) (96% - 99%)    O2 Parameters below as of 15 Dec 2023 06:00  Patient On (Oxygen Delivery Method): room air    CAPILLARY BLOOD GLUCOSE    POCT Blood Glucose.: 232 mg/dL (14 Dec 2023 22:43)  POCT Blood Glucose.: 133 mg/dL (14 Dec 2023 16:48)  POCT Blood Glucose.: 99 mg/dL (14 Dec 2023 12:21)      I&O's Summary    14 Dec 2023 07:01  -  15 Dec 2023 07:00  --------------------------------------------------------  IN: 696 mL / OUT: 2375 mL / NET: -1679 mL      PHYSICAL EXAMINATION:  General: WN/WD NAD  HEENT: PERRLA, EOMI, moist mucous membranes  Neurology: A&Ox3, nonfocal, MOISE x 4  Respiratory: CTA B/L, normal respiratory effort, no wheezes, crackles, rales  CV: RRR, S1S2, no murmurs, rubs or gallops  Abdominal: Soft, NT, ND +BS, Last BM  Extremities: R groin IABP site well appearing, no swelling or tenderness to palpation. No lower extremity edema, + peripheral pulses  Skin: bilateral upper extremity rash nearly resolved  LABS:                        12.5   7.63  )-----------( 163      ( 15 Dec 2023 04:03 )             38.3     12-15    135  |  104  |  30<H>  ----------------------------<  129<H>  4.3   |  19<L>  |  1.05    Ca    9.9      15 Dec 2023 04:03  Phos  3.4     12-15  Mg     1.8     12-15    TPro  7.0  /  Alb  3.9  /  TBili  0.2  /  DBili  x   /  AST  29  /  ALT  117<H>  /  AlkPhos  65  12-15    LIVER FUNCTIONS - ( 15 Dec 2023 04:03 )  Alb: 3.9 g/dL / Pro: 7.0 g/dL / ALK PHOS: 65 U/L / ALT: 117 U/L / AST: 29 U/L / GGT: x           PT/INR - ( 15 Dec 2023 04:03 )   PT: 11.4 sec;   INR: 1.09 ratio         PTT - ( 15 Dec 2023 04:03 )  PTT:61.9 sec      Urinalysis Basic - ( 15 Dec 2023 04:03 )    Color: x / Appearance: x / SG: x / pH: x  Gluc: 129 mg/dL / Ketone: x  / Bili: x / Urobili: x   Blood: x / Protein: x / Nitrite: x   Leuk Esterase: x / RBC: x / WBC x   Sq Epi: x / Non Sq Epi: x / Bacteria: x      IMAGING: CXR shows IABP in good position PATIENT:  DEVORAH VALENCIA    [id number]    58M with HFrEF (LVIDd 6.4 cm, LVEF 32%), CAD s/p PCI (2008), HTN, DMT2 (A1c 8.3%) and CVA s/p TPA (2018), recently treated for PNA who initially presented to [facility] via EMS after syncope reportedly requiring defibrilation. Treated for ACS but left AMA to come to [facility]. On arrival ECG with ST depression in lateral leads. Intubated 11/1 for respiratory failure. Found to have COVID. L/RHC 11/1revealing  of LAD and RCA, elevated filling pressures and CI of 1.5 prompting placement of IABP. Extubated 11/3. IABP was weaned to off 11/7, then the following day on 11/8, developed PMVT cardiac arrest with prompt CPR and defibrillation, started on IV Lidocaine and Amio. IABP ultimately replaced on 11/9 for worsening hypotension. TTE 11/11 revealing LV thrombus. IABP replaced 11/20. Currently UNOS status 2 awaiting transplant.     INTERVAL HISTORY/OVERNIGHT EVENTS:  The patient was seen and examined at bedside. No acute events overnight.  Stable on 1:1 IABP. Last BM 12/11, states he will attempt BM today.     REVIEW OF SYSTEMS:  all other ROS negative except as per HPI.     MEDICATIONS:  MEDICATIONS  (STANDING):  aspirin  chewable 81 milliGRAM(s) Oral daily  atorvastatin 80 milliGRAM(s) Oral at bedtime  budesonide  80 MICROgram(s)/formoterol 4.5 MICROgram(s) Inhaler 2 Puff(s) Inhalation two times a day  carvedilol 25 milliGRAM(s) Oral every 12 hours  chlorhexidine 2% Cloths 1 Application(s) Topical daily  heparin  Infusion 1300 Unit(s)/Hr (14 mL/Hr) IV Continuous <Continuous>  hydrALAZINE 50 milliGRAM(s) Oral every 8 hours  insulin glargine Injectable (LANTUS) 12 Unit(s) SubCutaneous at bedtime  insulin lispro (ADMELOG) corrective regimen sliding scale   SubCutaneous at bedtime  insulin lispro (ADMELOG) corrective regimen sliding scale   SubCutaneous three times a day before meals  insulin lispro Injectable (ADMELOG) 9 Unit(s) SubCutaneous three times a day before meals  isosorbide   dinitrate Tablet (ISORDIL) 30 milliGRAM(s) Oral three times a day  polyethylene glycol 3350 17 Gram(s) Oral two times a day  senna 2 Tablet(s) Oral at bedtime    MEDICATIONS  (PRN):  hydrOXYzine hydrochloride 25 milliGRAM(s) Oral two times a day PRN Anxiety      ALLERGIES:  Allergies    penicillins (Unknown)      OBJECTIVE:  ICU Vital Signs Last 24 Hrs  T(C): 36.9 (14 Dec 2023 20:00), Max: 36.9 (14 Dec 2023 20:00)  T(F): 98.4 (14 Dec 2023 20:00), Max: 98.4 (14 Dec 2023 20:00)  HR: 70 (15 Dec 2023 07:00) (67 - 91)  BP: --  BP(mean): --  ABP: --  ABP(mean): --  RR: 19 (15 Dec 2023 07:00) (13 - 29)  SpO2: 98% (15 Dec 2023 06:00) (96% - 99%)    O2 Parameters below as of 15 Dec 2023 06:00  Patient On (Oxygen Delivery Method): room air    CAPILLARY BLOOD GLUCOSE    POCT Blood Glucose.: 232 mg/dL (14 Dec 2023 22:43)  POCT Blood Glucose.: 133 mg/dL (14 Dec 2023 16:48)  POCT Blood Glucose.: 99 mg/dL (14 Dec 2023 12:21)      I&O's Summary    14 Dec 2023 07:01  -  15 Dec 2023 07:00  --------------------------------------------------------  IN: 696 mL / OUT: 2375 mL / NET: -1679 mL      PHYSICAL EXAMINATION:  General: WN/WD NAD  HEENT: PERRLA, EOMI, moist mucous membranes  Neurology: A&Ox3, nonfocal, MOISE x 4  Respiratory: CTA B/L, normal respiratory effort, no wheezes, crackles, rales  CV: RRR, S1S2, no murmurs, rubs or gallops  Abdominal: Soft, NT, ND +BS, Last BM  Extremities: R groin IABP site well appearing, no swelling or tenderness to palpation. No lower extremity edema, + peripheral pulses  Skin: bilateral upper extremity rash nearly resolved  LABS:                        12.5   7.63  )-----------( 163      ( 15 Dec 2023 04:03 )             38.3     12-15    135  |  104  |  30<H>  ----------------------------<  129<H>  4.3   |  19<L>  |  1.05    Ca    9.9      15 Dec 2023 04:03  Phos  3.4     12-15  Mg     1.8     12-15    TPro  7.0  /  Alb  3.9  /  TBili  0.2  /  DBili  x   /  AST  29  /  ALT  117<H>  /  AlkPhos  65  12-15    LIVER FUNCTIONS - ( 15 Dec 2023 04:03 )  Alb: 3.9 g/dL / Pro: 7.0 g/dL / ALK PHOS: 65 U/L / ALT: 117 U/L / AST: 29 U/L / GGT: x           PT/INR - ( 15 Dec 2023 04:03 )   PT: 11.4 sec;   INR: 1.09 ratio         PTT - ( 15 Dec 2023 04:03 )  PTT:61.9 sec      Urinalysis Basic - ( 15 Dec 2023 04:03 )    Color: x / Appearance: x / SG: x / pH: x  Gluc: 129 mg/dL / Ketone: x  / Bili: x / Urobili: x   Blood: x / Protein: x / Nitrite: x   Leuk Esterase: x / RBC: x / WBC x   Sq Epi: x / Non Sq Epi: x / Bacteria: x      IMAGING: CXR shows IABP in good position

## 2023-12-15 NOTE — PROGRESS NOTE ADULT - ATTENDING COMMENTS
57yo M with ischemic HFrEF, ADHF and cardiogenic shock awaiting heart transplant (status 2)  Neuro: no deficits, prn atarax for anxiety  Pulm: sating well on RA, prn albuterol and symbicort  CV: IABP 1:1 with optical sensor failure now RRA for pressure tracing, coreg, hydral, isordil  Renal: Cr under 1, no issues  GI: DASH diet  Heme: heparin gtt for LV thrombus and IABP.   ID: no active issues  Endo: BG controlled  Derm: Hepatitis immunization induced rash, improving on topical steroids  Lines: IABP (11/20), RRA (12/14) 59yo M with ischemic HFrEF, ADHF and cardiogenic shock awaiting heart transplant (status 2)  Neuro: no deficits, prn atarax for anxiety  Pulm: sating well on RA, prn albuterol and symbicort  CV: IABP 1:1 with optical sensor failure now RRA for pressure tracing, coreg, hydral, isordil  Renal: Cr under 1, no issues  GI: DASH diet  Heme: heparin gtt for LV thrombus and IABP.   ID: no active issues  Endo: BG controlled  Derm: Hepatitis immunization induced rash, improving on topical steroids  Lines: IABP (11/20), RRA (12/14)

## 2023-12-15 NOTE — PROGRESS NOTE ADULT - SUBJECTIVE AND OBJECTIVE BOX
DEVORAH VALENCIA  MRN-[id number]  Patient is a 58y old  Male who presents with a chief complaint of CP (14 Dec 2023 21:51)    HPI:  pt seen and approx 1:30 pm  in [facility]    59yo M w/ hx HTN, CAD w/ 1 stent in , ICH () presenting with abn ekg. Patient presented to [facility] where he was found to have STEMI, recommended to get cath however patient did not want to get it there so it left and came here.  Patient initially had cough, congestion, fever, was placed on antibiotics on .  Started feeling nauseous and had a presyncopal event after which he presented to ED last night.  Had chest pain as well.  Chest pain is midsternal.  Not currently having chest pain.  Received 4 aspirin 30 min pta. (2023 15:11)      Hospital Course:    24 HOUR EVENTS:    REVIEW OF SYSTEMS:    CONSTITUTIONAL: No weakness, fevers or chills  EYES/ENT: No visual changes;  No vertigo or throat pain   NECK: No pain or stiffness  RESPIRATORY: No cough, wheezing, hemoptysis; No shortness of breath  CARDIOVASCULAR: No chest pain or palpitations  GASTROINTESTINAL: No abdominal or epigastric pain. No nausea, vomiting, or hematemesis; No diarrhea or constipation. No melena or hematochezia.  GENITOURINARY: No dysuria, frequency or hematuria  NEUROLOGICAL: No numbness or weakness  SKIN: No itching, rashes      ICU Vital Signs Last 24 Hrs  T(C): 36.6 (15 Dec 2023 19:00), Max: 36.7 (15 Dec 2023 09:00)  T(F): 97.9 (15 Dec 2023 19:00), Max: 98.1 (15 Dec 2023 09:00)  HR: 75 (15 Dec 2023 21:00) (70 - 88)  BP: 108/61 (15 Dec 2023 08:45) (108/61 - 108/61)  BP(mean): 77 (15 Dec 2023 08:45) (77 - 77)  ABP: --  ABP(mean): --  RR: 22 (15 Dec 2023 21:00) (12 - 29)  SpO2: 95% (15 Dec 2023 19:00) (95% - 99%)    O2 Parameters below as of 15 Dec 2023 21:00  Patient On (Oxygen Delivery Method): room air            CVP(mm Hg): --  CO: --  CI: --  PA: --  PA(mean): --  PA(direct): --  PCWP: --  LA: --  RA: --  SVR: --  SVRI: --  PVR: --  PVRI: --    POCT Blood Glucose.: 128 mg/dL (12-15-23 @ 21:31)  POCT Blood Glucose.: 102 mg/dL (12-15-23 @ 17:06)  POCT Blood Glucose.: 302 mg/dL (12-15-23 @ 12:32)  POCT Blood Glucose.: 120 mg/dL (12-15-23 @ 08:40)  POCT Blood Glucose.: 232 mg/dL (23 @ 22:43)    CAPILLARY BLOOD GLUCOSE      POCT Blood Glucose.: 128 mg/dL (15 Dec 2023 21:31)      PHYSICAL EXAM:  GENERAL: No acute distress, well-developed  HEAD:  Atraumatic, Normocephalic  EYES: EOMI, PERRLA, conjunctiva and sclera clear  NECK: Supple, no lymphadenopathy, no JVD  CHEST/LUNG: CTAB; No wheezes, rales, or rhonchi  HEART: Regular rate and rhythm. Normal S1/S2. No murmurs, rubs, or gallops  ABDOMEN: Soft, non-tender, non-distended; normal bowel sounds, no organomegaly  EXTREMITIES:  2+ peripheral pulses b/l, No clubbing, cyanosis, or edema  NEUROLOGY: A&O x 3, no focal deficits  SKIN: No rashes or lesions    ============================I/O===========================   I&O's Detail    14 Dec 2023 07:01  -  15 Dec 2023 07:00  --------------------------------------------------------  IN:    Heparin: 336 mL    Oral Fluid: 360 mL  Total IN: 696 mL    OUT:    Voided (mL): 2375 mL  Total OUT: 2375 mL    Total NET: -1679 mL      15 Dec 2023 07:01  -  15 Dec 2023 22:33  --------------------------------------------------------  IN:    Heparin: 196 mL    Oral Fluid: 840 mL  Total IN: 1036 mL    OUT:    Voided (mL): 500 mL  Total OUT: 500 mL    Total NET: 536 mL        ============================ LABS =========================                        12.5   7.63  )-----------( 163      ( 15 Dec 2023 04:03 )             38.3     12-15    135  |  104  |  30<H>  ----------------------------<  129<H>  4.3   |  19<L>  |  1.05    Ca    9.9      15 Dec 2023 04:03  Phos  3.4     12-15  Mg     1.8     -15    TPro  7.0  /  Alb  3.9  /  TBili  0.2  /  DBili  x   /  AST  29  /  ALT  117<H>  /  AlkPhos  65  12-15                LIVER FUNCTIONS - ( 15 Dec 2023 04:03 )  Alb: 3.9 g/dL / Pro: 7.0 g/dL / ALK PHOS: 65 U/L / ALT: 117 U/L / AST: 29 U/L / GGT: x           PT/INR - ( 15 Dec 2023 04:03 )   PT: 11.4 sec;   INR: 1.09 ratio         PTT - ( 15 Dec 2023 04:03 )  PTT:61.9 sec      Urinalysis Basic - ( 15 Dec 2023 04:03 )    Color: x / Appearance: x / SG: x / pH: x  Gluc: 129 mg/dL / Ketone: x  / Bili: x / Urobili: x   Blood: x / Protein: x / Nitrite: x   Leuk Esterase: x / RBC: x / WBC x   Sq Epi: x / Non Sq Epi: x / Bacteria: x      ======================Micro/Rad/Cardio=================  Telemtry: Reviewed   EKG: Reviewed  CXR: Reviewed  Culture: Reviewed   Echo:   Cath: Cardiac Cath Lab - Adult:   [facility]  Department of Cardiology  [address] [place] [address]  [phone]  Cath Lab Report -- Comprehensive Report  Patient: LD VALENCIA  Study date: 2017  Account number: [id number]  MR number: [id number]  : 1965  Gender: Male  Race: W  Case Physician(s):  Jairon Holguin M.D.  Referring Physician:  Luc Lynn M.D.  INDICATIONS: Unstable angina - CCS4.  HISTORY: The patient has a history of coronary artery disease. The patient  hashypertension and medication-treated dyslipidemia.  PROCEDURE:  --  Left heart catheterization with ventriculography.  --  Left coronary angiography.  --  Right coronary angiography.  TECHNIQUE: The risks and alternatives of the procedures and conscious  sedation were explained to the patient and informed consent was obtained.  Cardiac catheterization performed electively.  Local anesthetic given. Right radial artery access. A 6FR PRELUDE KIT was  inserted in the vessel. Left heart catheterization. Ventriculography was  performed. A 5FR FR4.0 EXPO catheter was utilized. Left coronary artery  angiography. The vessel was injected utilizing a 5FR FL3.5 EXPO catheter.  Right coronary artery angiography. The vessel was injected utilizing a 5FR  FR4.0 EXPO catheter. RADIATION EXPOSURE: 1.1 min.  CONTRAST GIVEN: Omnipaque 55 ml.  MEDICATIONS GIVEN: Midazolam, 1 mg, IV. Fentanyl, 25 mcg, IV. Verapamil  (Isoptin, Calan, Covera), 2.5 mg, IA. Heparin, 3000 units, IA.  VENTRICLES: Global left ventricular function was moderately depressed. EF  estimated was 40 %.  CORONARY VESSELS: The coronary circulation is right dominant.  LM:   --  LM: Normal.  LAD:   --  Proximal LAD: There was a 50 % stenosis.  CX:   --  Circumflex: Normal.  RCA:   --  Mid RCA: There was a 40 % stenosis.  --  Distal RCA: There was a 50 % stenosis.  COMPLICATIONS: There were no complications.  DIAGNOSTIC RECOMMENDATIONS: The patient should continue with the present  medications.  Prepared and signed by  Jairon Holguin M.D.  Signed 2017 12:20:13  HEMODYNAMIC TABLES  Pressures:  Baseline/ Room Air  Pressures:  - HR: 78  Pressures:  - Rhythm:  Pressures:  -- Aortic Pressure (S/D/M): --/--/99  Pressures:  -- Left Ventricle (s/edp): 157/39/--  Outputs:  Baseline/ Room Air  Outputs:  -- CALCULATIONS[age]  Outputs:  -- CALCULATIONS: Body Surface Area: 2.05  Outputs:  -- CALCULATIONS: Height in cm: 175.00  Outputs:  -- CALCULATIONS: Sex: Male  Outputs:  -- CALCULATIONS: Weight in k.40 (17 @ 21:55)    ======================================================  PAST MEDICAL & SURGICAL HISTORY:  HTN (hypertension)      CAD (coronary artery disease)  ; stent      Intracranial hemorrhage        Respiratory arrest        Myocardial infarction, unspecified MI type, unspecified artery      History of coronary artery stent placement        ====================ASSESSMENT ==============            Plan:  ====================CARDIOVASCULAR==================  Vfib arrest i/s/o ischemia  - Lido gtt off   - PO Amio load - total of 5g per EP complete   - Amio dc'd  for rising LFTs  - Keep K > 4, Mag > 2.2     Cardiogenic shock requiring IABP (- , -)  - Likely 2/2 NSTEMI and ADHF  -  LHC: pLAD 100 % in-stent restenosis & mRCA, 100 %. PCWP 30. IABP placed.  -  TTE: LV dilated. EF 32 %. Regional WMAs present, mod (grade 2) LV diastolic dysfunction  -  TTE: EF 22% and + LV thrombus  - IABP swapped  to RFA, continue 1:1 support  - Off Milrinone gtt @ 7:30 am   - James d/c'd due to elevated K levels  -  - reduced hydralazine 75 TID to 50 TID and ISD 40 TID to 30 TID for AL reduction  - c/w coreg 25 BID for GDMT  - UNOS status 2 as of     NSTEMI iso stent re-occlusion of pLAD and 100%  of RCA  - EKG on admission w/ LBBB  - DAPT: c/w ASA, Brilinta d/c'd per transplant w/u  - c/w lipitor 80  - cMR deferred given necessity of IABP  - CT sx not recommending CABG, undergoing AT eval    LV thrombus  - c/w heparin gtt w/ therapeutic PTT      carvedilol 25 milliGRAM(s) Oral every 12 hours  hydrALAZINE 50 milliGRAM(s) Oral every 8 hours  isosorbide   dinitrate Tablet (ISORDIL) 30 milliGRAM(s) Oral three times a day      ====================== NEUROLOGY=====================  Anxiety  - No Seroquel/antipsychotics since vfib arrest and prolonged QTC  - Psych Eval, recommended SSRI, but pt. refused   - Psych recommending atarax PRN  - Continue to monitor mental status    PT/Conditioning  - Continue band exercises while on bedrest s/t IABP    hydrOXYzine hydrochloride 25 milliGRAM(s) Oral two times a day PRN Anxiety    ==================== RESPIRATORY======================  Acute Hypoxemic Respiratory Failure  - s/p x2 intubations for cardiogenic pulm edema and the in setting of cardiac arrest, resolved - extubated 11/10  - Currently maintaining >95% sats on room air  - Continue incentive spirometry and monitoring of sp02    Asthma  - c/w albuterol, symbicort and spiriva  - On trelegy at home  - Continue to monitor SpO2 with goal >94%      budesonide  80 MICROgram(s)/formoterol 4.5 MICROgram(s) Inhaler 2 Puff(s) Inhalation two times a day    ===================== RENAL =========================  Non-oliguric ARIC, resolved   - Baseline Cr:   - Renal US: no evidence of renal artery stenosis  - Trend BMP, lytes daily, replace as needed  - Continue Strict I/Os, avoid nephrotoxins    Mild Hyponatremia/Hyperkalemia iso ARIC  - resolved  - Urea powder d/c'd per renal  - Trend daily  - Continue monitoring urine output, lytes, SCr/ BUN  - Replete lytes prn with goal K >4 and Mg >2    23 @ 07:01  -  12-15-23 @ 07:00  --------------------------------------------------------  IN: 696 mL / OUT: 2375 mL / NET: -1679 mL    12-15-23 @ 07:  -  12-15-23 @ 22:33  --------------------------------------------------------  IN: 1036 mL / OUT: 500 mL / NET: 536 mL      Renal Replacement Therapy:  [ ] CRRT      [ ] IHD, Last Session:    Fluid removal:     [ ] Diuretic therapy, Regimen:       ==================== GASTROINTESTINAL===================  Constipation/ileus, resolved  - regular diet  - bowel reg prn    Last BM:   Indication for Stress Ulcer Prophylaxis, [ ] Yes    [ ] No   If Yes, Medication:     polyethylene glycol 3350 17 Gram(s) Oral two times a day  senna 2 Tablet(s) Oral at bedtime    ========================INFECTIOUS DISEASE================  Positive blood culture, resolved  - Repeat BCx drawn  for leukocytosis to 12k - positive on , G+ rods in anaerobic bottle, suspect contaminant  - Repeat cultures x2 sent  per transplant ID recs - no growth to date  - Vancomycin by trough (-)  - Final microbial ID: Cutibacterium acnes, per ID this is likely to be a skin contaminant, vanc dc'd.   - Dental eval  to rule out infectious etiology that would have led to bacteremia, no significant findings on exam.     Enterococcus faecalis bacteremia, resolved  - BCx + for enterococcus faecalis x2, Staph epi x1 (likely contaminant)- pan sensitive   - Urine cx  + enterococcus faecalis  - BCx  no growth   - IABP site swapped to RFA   - s/p Vancomycin 1g q12h (-)  - CT A/P negative for infectious pathology    COVID, resolved  - Off airborne precautions     Pre-transplant ID w/u   - Trend ID recs for serologies   - Colonoscopy  - normal   - Chest CT  - improved LLL aeration  - s/p immunizations    T(C): 36.6 (12-15-23 @ 19:00), Max: 36.7 (12-15-23 @ 09:00)  WBC Count: 7.63 K/uL (12-15-23 @ 04:03)  WBC Count: 7.94 K/uL (23 @ 02:25)        Current Antibiotics with start date:       ===================HEMATOLOGIC/ONC ===================  H/H & plts stable  - Monitor H/H and plts  - VTE PPX: heparin gtt    Hemoglobin: 12.5 g/dL (12-15-23 @ 04:03)  Hemoglobin: 12.1 g/dL (23 @ 02:25)    Platelet Count - Automated: 163 K/uL (12-15-23 @ 04:03)  Platelet Count - Automated: 152 K/uL (23 @ 02:25)    Chemical VTE Prophylaxis:  [ ] Lovenox    [ ] SQH   [ ]NA  Systemic Anticogaulation:  [ ] Yes    [ ] No,  If Yes, Medication:     aspirin  chewable 81 milliGRAM(s) Oral daily  heparin  Infusion 1300 Unit(s)/Hr (14 mL/Hr) IV Continuous <Continuous>    =======================    ENDOCRINE  =====================  Type 2 DM  - A1c 8.3, glucose controlled  - Continue lantus, premeal, low ISS  - continue FS    POCT Blood Glucose.: 128 mg/dL (12-15-23 @ 21:31)  POCT Blood Glucose.: 102 mg/dL (12-15-23 @ 17:06)  POCT Blood Glucose.: 302 mg/dL (12-15-23 @ 12:32)  POCT Blood Glucose.: 120 mg/dL (12-15-23 @ 08:40)  POCT Blood Glucose.: 232 mg/dL (23 @ 22:43)        atorvastatin 80 milliGRAM(s) Oral at bedtime  insulin glargine Injectable (LANTUS) 12 Unit(s) SubCutaneous at bedtime  insulin lispro (ADMELOG) corrective regimen sliding scale   SubCutaneous at bedtime  insulin lispro (ADMELOG) corrective regimen sliding scale   SubCutaneous three times a day before meals  insulin lispro Injectable (ADMELOG) 9 Unit(s) SubCutaneous three times a day before meals      Patient requires continuous monitoring with bedside rhythm monitoring, pulse ox monitoring, and intermittent blood gas analysis. Care plan discussed with ICU care team. Patient remained critical and at risk for life threatening decompensation.  Patient seen, examined and plan discussed with CCU team during rounds.       I have personally provided __30__ minutes of critical care time excluding time spent on separate procedures, in addition to initial critical care time provided by the CICU Attending, Dr. Her       DEVORAH VALENCIA  MRN-[id number]  Patient is a 58y old  Male who presents with a chief complaint of CP (14 Dec 2023 21:51)    HPI:  pt seen and approx 1:30 pm  in [facility]    59yo M w/ hx HTN, CAD w/ 1 stent in , ICH () presenting with abn ekg. Patient presented to [facility] where he was found to have STEMI, recommended to get cath however patient did not want to get it there so it left and came here.  Patient initially had cough, congestion, fever, was placed on antibiotics on .  Started feeling nauseous and had a presyncopal event after which he presented to ED last night.  Had chest pain as well.  Chest pain is midsternal.  Not currently having chest pain.  Received 4 aspirin 30 min pta. (2023 15:11)      Hospital Course:    24 HOUR EVENTS:    REVIEW OF SYSTEMS:    CONSTITUTIONAL: No weakness, fevers or chills  EYES/ENT: No visual changes;  No vertigo or throat pain   NECK: No pain or stiffness  RESPIRATORY: No cough, wheezing, hemoptysis; No shortness of breath  CARDIOVASCULAR: No chest pain or palpitations  GASTROINTESTINAL: No abdominal or epigastric pain. No nausea, vomiting, or hematemesis; No diarrhea or constipation. No melena or hematochezia.  GENITOURINARY: No dysuria, frequency or hematuria  NEUROLOGICAL: No numbness or weakness  SKIN: No itching, rashes      ICU Vital Signs Last 24 Hrs  T(C): 36.6 (15 Dec 2023 19:00), Max: 36.7 (15 Dec 2023 09:00)  T(F): 97.9 (15 Dec 2023 19:00), Max: 98.1 (15 Dec 2023 09:00)  HR: 75 (15 Dec 2023 21:00) (70 - 88)  BP: 108/61 (15 Dec 2023 08:45) (108/61 - 108/61)  BP(mean): 77 (15 Dec 2023 08:45) (77 - 77)  ABP: --  ABP(mean): --  RR: 22 (15 Dec 2023 21:00) (12 - 29)  SpO2: 95% (15 Dec 2023 19:00) (95% - 99%)    O2 Parameters below as of 15 Dec 2023 21:00  Patient On (Oxygen Delivery Method): room air            CVP(mm Hg): --  CO: --  CI: --  PA: --  PA(mean): --  PA(direct): --  PCWP: --  LA: --  RA: --  SVR: --  SVRI: --  PVR: --  PVRI: --    POCT Blood Glucose.: 128 mg/dL (12-15-23 @ 21:31)  POCT Blood Glucose.: 102 mg/dL (12-15-23 @ 17:06)  POCT Blood Glucose.: 302 mg/dL (12-15-23 @ 12:32)  POCT Blood Glucose.: 120 mg/dL (12-15-23 @ 08:40)  POCT Blood Glucose.: 232 mg/dL (23 @ 22:43)    CAPILLARY BLOOD GLUCOSE      POCT Blood Glucose.: 128 mg/dL (15 Dec 2023 21:31)      PHYSICAL EXAM:  GENERAL: No acute distress, well-developed  HEAD:  Atraumatic, Normocephalic  EYES: EOMI, PERRLA, conjunctiva and sclera clear  NECK: Supple, no lymphadenopathy, no JVD  CHEST/LUNG: CTAB; No wheezes, rales, or rhonchi  HEART: Regular rate and rhythm. Normal S1/S2. No murmurs, rubs, or gallops  ABDOMEN: Soft, non-tender, non-distended; normal bowel sounds, no organomegaly  EXTREMITIES:  2+ peripheral pulses b/l, No clubbing, cyanosis, or edema  NEUROLOGY: A&O x 3, no focal deficits  SKIN: No rashes or lesions    ============================I/O===========================   I&O's Detail    14 Dec 2023 07:01  -  15 Dec 2023 07:00  --------------------------------------------------------  IN:    Heparin: 336 mL    Oral Fluid: 360 mL  Total IN: 696 mL    OUT:    Voided (mL): 2375 mL  Total OUT: 2375 mL    Total NET: -1679 mL      15 Dec 2023 07:01  -  15 Dec 2023 22:33  --------------------------------------------------------  IN:    Heparin: 196 mL    Oral Fluid: 840 mL  Total IN: 1036 mL    OUT:    Voided (mL): 500 mL  Total OUT: 500 mL    Total NET: 536 mL        ============================ LABS =========================                        12.5   7.63  )-----------( 163      ( 15 Dec 2023 04:03 )             38.3     12-15    135  |  104  |  30<H>  ----------------------------<  129<H>  4.3   |  19<L>  |  1.05    Ca    9.9      15 Dec 2023 04:03  Phos  3.4     12-15  Mg     1.8     -15    TPro  7.0  /  Alb  3.9  /  TBili  0.2  /  DBili  x   /  AST  29  /  ALT  117<H>  /  AlkPhos  65  12-15                LIVER FUNCTIONS - ( 15 Dec 2023 04:03 )  Alb: 3.9 g/dL / Pro: 7.0 g/dL / ALK PHOS: 65 U/L / ALT: 117 U/L / AST: 29 U/L / GGT: x           PT/INR - ( 15 Dec 2023 04:03 )   PT: 11.4 sec;   INR: 1.09 ratio         PTT - ( 15 Dec 2023 04:03 )  PTT:61.9 sec      Urinalysis Basic - ( 15 Dec 2023 04:03 )    Color: x / Appearance: x / SG: x / pH: x  Gluc: 129 mg/dL / Ketone: x  / Bili: x / Urobili: x   Blood: x / Protein: x / Nitrite: x   Leuk Esterase: x / RBC: x / WBC x   Sq Epi: x / Non Sq Epi: x / Bacteria: x      ======================Micro/Rad/Cardio=================  Telemtry: Reviewed   EKG: Reviewed  CXR: Reviewed  Culture: Reviewed   Echo:   Cath: Cardiac Cath Lab - Adult:   [facility]  Department of Cardiology  [address] [place] [address]  [phone]  Cath Lab Report -- Comprehensive Report  Patient: LD VALENCIA  Study date: 2017  Account number: [id number]  MR number: [id number]  : 1965  Gender: Male  Race: W  Case Physician(s):  Jairon Holguin M.D.  Referring Physician:  Luc Lynn M.D.  INDICATIONS: Unstable angina - CCS4.  HISTORY: The patient has a history of coronary artery disease. The patient  hashypertension and medication-treated dyslipidemia.  PROCEDURE:  --  Left heart catheterization with ventriculography.  --  Left coronary angiography.  --  Right coronary angiography.  TECHNIQUE: The risks and alternatives of the procedures and conscious  sedation were explained to the patient and informed consent was obtained.  Cardiac catheterization performed electively.  Local anesthetic given. Right radial artery access. A 6FR PRELUDE KIT was  inserted in the vessel. Left heart catheterization. Ventriculography was  performed. A 5FR FR4.0 EXPO catheter was utilized. Left coronary artery  angiography. The vessel was injected utilizing a 5FR FL3.5 EXPO catheter.  Right coronary artery angiography. The vessel was injected utilizing a 5FR  FR4.0 EXPO catheter. RADIATION EXPOSURE: 1.1 min.  CONTRAST GIVEN: Omnipaque 55 ml.  MEDICATIONS GIVEN: Midazolam, 1 mg, IV. Fentanyl, 25 mcg, IV. Verapamil  (Isoptin, Calan, Covera), 2.5 mg, IA. Heparin, 3000 units, IA.  VENTRICLES: Global left ventricular function was moderately depressed. EF  estimated was 40 %.  CORONARY VESSELS: The coronary circulation is right dominant.  LM:   --  LM: Normal.  LAD:   --  Proximal LAD: There was a 50 % stenosis.  CX:   --  Circumflex: Normal.  RCA:   --  Mid RCA: There was a 40 % stenosis.  --  Distal RCA: There was a 50 % stenosis.  COMPLICATIONS: There were no complications.  DIAGNOSTIC RECOMMENDATIONS: The patient should continue with the present  medications.  Prepared and signed by  Jairon Holguin M.D.  Signed 2017 12:20:13  HEMODYNAMIC TABLES  Pressures:  Baseline/ Room Air  Pressures:  - HR: 78  Pressures:  - Rhythm:  Pressures:  -- Aortic Pressure (S/D/M): --/--/99  Pressures:  -- Left Ventricle (s/edp): 157/39/--  Outputs:  Baseline/ Room Air  Outputs:  -- CALCULATIONS[age]  Outputs:  -- CALCULATIONS: Body Surface Area: 2.05  Outputs:  -- CALCULATIONS: Height in cm: 175.00  Outputs:  -- CALCULATIONS: Sex: Male  Outputs:  -- CALCULATIONS: Weight in k.40 (17 @ 21:55)    ======================================================  PAST MEDICAL & SURGICAL HISTORY:  HTN (hypertension)      CAD (coronary artery disease)  ; stent      Intracranial hemorrhage        Respiratory arrest        Myocardial infarction, unspecified MI type, unspecified artery      History of coronary artery stent placement        ====================ASSESSMENT ==============            Plan:  ====================CARDIOVASCULAR==================  Vfib arrest i/s/o ischemia  - Lido gtt off   - PO Amio load - total of 5g per EP complete   - Amio dc'd  for rising LFTs  - Keep K > 4, Mag > 2.2     Cardiogenic shock requiring IABP (- , -)  - Likely 2/2 NSTEMI and ADHF  -  LHC: pLAD 100 % in-stent restenosis & mRCA, 100 %. PCWP 30. IABP placed.  -  TTE: LV dilated. EF 32 %. Regional WMAs present, mod (grade 2) LV diastolic dysfunction  -  TTE: EF 22% and + LV thrombus  - IABP swapped  to RFA, continue 1:1 support  - Off Milrinone gtt @ 7:30 am   - James d/c'd due to elevated K levels  -  - reduced hydralazine 75 TID to 50 TID and ISD 40 TID to 30 TID for AL reduction  - c/w coreg 25 BID for GDMT  - UNOS status 2 as of     NSTEMI iso stent re-occlusion of pLAD and 100%  of RCA  - EKG on admission w/ LBBB  - DAPT: c/w ASA, Brilinta d/c'd per transplant w/u  - c/w lipitor 80  - cMR deferred given necessity of IABP  - CT sx not recommending CABG, undergoing AT eval    LV thrombus  - c/w heparin gtt w/ therapeutic PTT      carvedilol 25 milliGRAM(s) Oral every 12 hours  hydrALAZINE 50 milliGRAM(s) Oral every 8 hours  isosorbide   dinitrate Tablet (ISORDIL) 30 milliGRAM(s) Oral three times a day      ====================== NEUROLOGY=====================  Anxiety  - No Seroquel/antipsychotics since vfib arrest and prolonged QTC  - Psych Eval, recommended SSRI, but pt. refused   - Psych recommending atarax PRN  - Continue to monitor mental status    PT/Conditioning  - Continue band exercises while on bedrest s/t IABP    hydrOXYzine hydrochloride 25 milliGRAM(s) Oral two times a day PRN Anxiety    ==================== RESPIRATORY======================  Acute Hypoxemic Respiratory Failure  - s/p x2 intubations for cardiogenic pulm edema and the in setting of cardiac arrest, resolved - extubated 11/10  - Currently maintaining >95% sats on room air  - Continue incentive spirometry and monitoring of sp02    Asthma  - c/w albuterol, symbicort and spiriva  - On trelegy at home  - Continue to monitor SpO2 with goal >94%      budesonide  80 MICROgram(s)/formoterol 4.5 MICROgram(s) Inhaler 2 Puff(s) Inhalation two times a day    ===================== RENAL =========================  Non-oliguric ARIC, resolved   - Baseline Cr:   - Renal US: no evidence of renal artery stenosis  - Trend BMP, lytes daily, replace as needed  - Continue Strict I/Os, avoid nephrotoxins    Mild Hyponatremia/Hyperkalemia iso ARIC  - resolved  - Urea powder d/c'd per renal  - Trend daily  - Continue monitoring urine output, lytes, SCr/ BUN  - Replete lytes prn with goal K >4 and Mg >2    23 @ 07:01  -  12-15-23 @ 07:00  --------------------------------------------------------  IN: 696 mL / OUT: 2375 mL / NET: -1679 mL    12-15-23 @ 07:  -  12-15-23 @ 22:33  --------------------------------------------------------  IN: 1036 mL / OUT: 500 mL / NET: 536 mL      Renal Replacement Therapy:  [ ] CRRT      [ ] IHD, Last Session:    Fluid removal:     [ ] Diuretic therapy, Regimen:       ==================== GASTROINTESTINAL===================  Constipation/ileus, resolved  - regular diet  - bowel reg prn    Last BM:   Indication for Stress Ulcer Prophylaxis, [ ] Yes    [ ] No   If Yes, Medication:     polyethylene glycol 3350 17 Gram(s) Oral two times a day  senna 2 Tablet(s) Oral at bedtime    ========================INFECTIOUS DISEASE================  Positive blood culture, resolved  - Repeat BCx drawn  for leukocytosis to 12k - positive on , G+ rods in anaerobic bottle, suspect contaminant  - Repeat cultures x2 sent  per transplant ID recs - no growth to date  - Vancomycin by trough (-)  - Final microbial ID: Cutibacterium acnes, per ID this is likely to be a skin contaminant, vanc dc'd.   - Dental eval  to rule out infectious etiology that would have led to bacteremia, no significant findings on exam.     Enterococcus faecalis bacteremia, resolved  - BCx + for enterococcus faecalis x2, Staph epi x1 (likely contaminant)- pan sensitive   - Urine cx  + enterococcus faecalis  - BCx  no growth   - IABP site swapped to RFA   - s/p Vancomycin 1g q12h (-)  - CT A/P negative for infectious pathology    COVID, resolved  - Off airborne precautions     Pre-transplant ID w/u   - Trend ID recs for serologies   - Colonoscopy  - normal   - Chest CT  - improved LLL aeration  - s/p immunizations    T(C): 36.6 (12-15-23 @ 19:00), Max: 36.7 (12-15-23 @ 09:00)  WBC Count: 7.63 K/uL (12-15-23 @ 04:03)  WBC Count: 7.94 K/uL (23 @ 02:25)        Current Antibiotics with start date:       ===================HEMATOLOGIC/ONC ===================  H/H & plts stable  - Monitor H/H and plts  - VTE PPX: heparin gtt    Hemoglobin: 12.5 g/dL (12-15-23 @ 04:03)  Hemoglobin: 12.1 g/dL (23 @ 02:25)    Platelet Count - Automated: 163 K/uL (12-15-23 @ 04:03)  Platelet Count - Automated: 152 K/uL (23 @ 02:25)    Chemical VTE Prophylaxis:  [ ] Lovenox    [ ] SQH   [ ]NA  Systemic Anticogaulation:  [ ] Yes    [ ] No,  If Yes, Medication:     aspirin  chewable 81 milliGRAM(s) Oral daily  heparin  Infusion 1300 Unit(s)/Hr (14 mL/Hr) IV Continuous <Continuous>    =======================    ENDOCRINE  =====================  Type 2 DM  - A1c 8.3, glucose controlled  - Continue lantus, premeal, low ISS  - continue FS    POCT Blood Glucose.: 128 mg/dL (12-15-23 @ 21:31)  POCT Blood Glucose.: 102 mg/dL (12-15-23 @ 17:06)  POCT Blood Glucose.: 302 mg/dL (12-15-23 @ 12:32)  POCT Blood Glucose.: 120 mg/dL (12-15-23 @ 08:40)  POCT Blood Glucose.: 232 mg/dL (23 @ 22:43)        atorvastatin 80 milliGRAM(s) Oral at bedtime  insulin glargine Injectable (LANTUS) 12 Unit(s) SubCutaneous at bedtime  insulin lispro (ADMELOG) corrective regimen sliding scale   SubCutaneous at bedtime  insulin lispro (ADMELOG) corrective regimen sliding scale   SubCutaneous three times a day before meals  insulin lispro Injectable (ADMELOG) 9 Unit(s) SubCutaneous three times a day before meals      Patient requires continuous monitoring with bedside rhythm monitoring, pulse ox monitoring, and intermittent blood gas analysis. Care plan discussed with ICU care team. Patient remained critical and at risk for life threatening decompensation.  Patient seen, examined and plan discussed with CCU team during rounds.       I have personally provided __30__ minutes of critical care time excluding time spent on separate procedures, in addition to initial critical care time provided by the CICU Attending, Dr. Her

## 2023-12-15 NOTE — PROGRESS NOTE ADULT - PROBLEM SELECTOR PLAN 1
-Listen UNOS status 2.   - L IABP at 1:1, placed 11/9. Exchange to LFA given high grade bacteremia on 11/20. On AC with Heparin infusion (also for LV thrombus)  - Continue Coreg 25 mg BID hold for MAP <65  - Continue HDZN 50 mg TID and ISDN 30 mg TID, hold for MAP <65  - Off romel given HyperK  - AT evaluation: Accepted for transplant, currently listed UNOS Status 2. ABO A. PRA 0% 12/12.    However, downgraded to status 7 on 12/4 given + blood culture. But now re-upgraded to status 2.   - Please keep primary Dr. Banuelos [phone] in the loop per family request. -Listen UNOS status 2.   - L IABP at 1:1, placed 11/9. Exchange to LFA given high grade bacteremia on 11/20. On AC with Heparin infusion (also for LV thrombus)  - Continue Coreg 25 mg BID hold for MAP <65  - Continue HDZN 50 mg TID and ISDN 30 mg TID, hold for MAP <65  - Off romel given HyperK  - AT evaluation: Accepted for transplant, currently listed UNOS Status 2. ABO A. PRA 0% 12/12.    However, downgraded to status 7 on 12/4 given + blood culture. But now re-upgraded to status 2.   - Please keep primary Dr. Banuelos [phone] in the loop per family request.

## 2023-12-15 NOTE — PROGRESS NOTE ADULT - ASSESSMENT
59 yo M with HFrEF (LVIDd 6.4 cm, LVEF 32%), CAD s/p PCI (2008), HTN, DMT2 (A1c 8.3%) and CVA s/p TPA (2018), recently treated for PNA who initially presented to [facility] via EMS after syncope reportedly requiring defibrilation. Treated for ACS but left AMA to come to [facility]. On arrival ECG with ST depression in lateral leads. Intubated 11/1 for respiratory failure. Found to have COVID. L/RHC 11/1revealing  of LAD and RCA, elevated filling pressures and CI of 1.5 prompting placement of IABP. Extubated 11/3. IABP was weaned to off 11/7, then the following day on 11/8, developed PMVT cardiac arrest with prompt CPR and defibrillation, started on IV Lidocaine and Amio. IABP ultimately replaced on 11/9 for worsening hypotension. TTE 11/11 revealing LV thrombus.     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 AT evaluation launched 11/10, he is ABO A. He was discussed during TSC on 11/16 and was approved for listing, however was found to be Bacteremic with 11/17 Blc Cx growing mixed cultures Staph epi and Enterococcus faecalis and started on IV Vanco. Repeat cultures from 11/18 reveal NGTD and therefore was cleared by ID for listing.     He appears adequately supported on IABP at 1:1, electrically quiescent on oral Amiodarone. Cr is improving with holding diuretics. Labs otherwise notable for worsening thrombocytopenia. Awaiting suitable donor. GM + rods (Cutibacterium) in blood culture on 11/30 (reported on 12/4), restarted on vancomycin, and status 7, repeat cultures negative x48 hours (12/6),  now status 2 again.         Cardiac Studies  11/21/23 TTE: limited unable to rule out endocarditis, technically difficult study  11/1123 TTE: LVEF 22% (global), LV thrombus, normal RV size and function, mild MR, trace TR  11/1/23  LHC showed pLAD 70 % stenosis in the ostium portion of the segment and 100 % in-stent restenosis and in mRCA, 100 % stenosis.   11/1/23 RHC; RA 23 Vw 24, PA 52/33/40, PCWP 30 Vw 33, AO 85/66/73, PA sat 54.4%, CO/CI (F) 2.96/1.44, IABP was placed during the cath through R common femoral artery.   11/1/23 TTE: LVIDd 6.4cm , LVEF 32%, regional WMA, grade II DD,  TAPSE 1.7cm, mld MR, trace TR,      59 yo M with HFrEF (LVIDd 6.4 cm, LVEF 32%), CAD s/p PCI (2008), HTN, DMT2 (A1c 8.3%) and CVA s/p TPA (2018), recently treated for PNA who initially presented to [facility] via EMS after syncope reportedly requiring defibrilation. Treated for ACS but left AMA to come to [facility]. On arrival ECG with ST depression in lateral leads. Intubated 11/1 for respiratory failure. Found to have COVID. L/RHC 11/1revealing  of LAD and RCA, elevated filling pressures and CI of 1.5 prompting placement of IABP. Extubated 11/3. IABP was weaned to off 11/7, then the following day on 11/8, developed PMVT cardiac arrest with prompt CPR and defibrillation, started on IV Lidocaine and Amio. IABP ultimately replaced on 11/9 for worsening hypotension. TTE 11/11 revealing LV thrombus.     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 AT evaluation launched 11/10, he is ABO A. He was discussed during TSC on 11/16 and was approved for listing, however was found to be Bacteremic with 11/17 Blc Cx growing mixed cultures Staph epi and Enterococcus faecalis and started on IV Vanco. Repeat cultures from 11/18 reveal NGTD and therefore was cleared by ID for listing.     He appears adequately supported on IABP at 1:1, electrically quiescent on oral Amiodarone. Cr is improving with holding diuretics. Labs otherwise notable for worsening thrombocytopenia. Awaiting suitable donor. GM + rods (Cutibacterium) in blood culture on 11/30 (reported on 12/4), restarted on vancomycin, and status 7, repeat cultures negative x48 hours (12/6),  now status 2 again.         Cardiac Studies  11/21/23 TTE: limited unable to rule out endocarditis, technically difficult study  11/1123 TTE: LVEF 22% (global), LV thrombus, normal RV size and function, mild MR, trace TR  11/1/23  LHC showed pLAD 70 % stenosis in the ostium portion of the segment and 100 % in-stent restenosis and in mRCA, 100 % stenosis.   11/1/23 RHC; RA 23 Vw 24, PA 52/33/40, PCWP 30 Vw 33, AO 85/66/73, PA sat 54.4%, CO/CI (F) 2.96/1.44, IABP was placed during the cath through R common femoral artery.   11/1/23 TTE: LVIDd 6.4cm , LVEF 32%, regional WMA, grade II DD,  TAPSE 1.7cm, mld MR, trace TR,

## 2023-12-15 NOTE — PROGRESS NOTE ADULT - ASSESSMENT
57 yo M with PMHx of HTN, CAD w/ 1 stent in 2009, ICH (2008) presented on 11/1 with abn ekg. Patient presented to [facility] where he was found to have STEMI, recommended to get cath however patient did not want to get it there so he left and came here.   EKG here with ST depression in lateral leads and elevation in anterior leads   Prior to C found to be tachycardic, dyspneic, intubated   C 11/1 with chronic total occlusion of LAD and RCA, with elevated RA and PA pressures  TTE 11/1 with severely decreased EF 32%, s/p IABP 11/1    RVP (11/1) Positive for COVID19  RVP (11/10) Negative  BCx (11/2, 11/4) NGTD  ETT Culture (11/2) NGTD  MRSA/MSSA PCR (11/2, 11/10) Negative  UA (11/10) 1 WBC    CXR (11/10) Clear Lungs  TTE (11/2) Left ventricular thrombus.    #Positive Blood Cultures (11/30 - Cutibacterium)  Growth this far from procurement raises question of contaminant  Given ID as Cutibacterium skin procurement contaminant is favored  Repeat blood cultures with NGTD  --No absolute ID contraindication to re-activate for transplant.   --Continue to follow CBC with diff  --Continue to follow temperature curve    #Rash  ?Secondary to Cefepime on 11/2?  ?Secondary to hepatitis vaccine?  ?Secondary to Vancomycin  --Appreciate Dermatology input  --Follow LFT's and CBC with Diff (For Eosinophil Counts)    #Enterococcus Bacteremia, Leukocytosis, Pre-Heart Transplant Evaluation Serologies  Concern for either central line or urinary source  CT A/P Noncontrast (11/18) No acute process  s/p ten-days of IV Vancomycin last dose received on 11/27    #Encounter to Vaccinate Patient  COVID19: COVID19 Infection This Admission. Would consider Vaccination in ~3 months  Influenza: declined  Pneumococcal: Recommend PCV20  HAV: received first dose 11/24  HBV: received first dose Heplisav 11/24  MMR: Not Mumps Immune, if >1 month until transplant would consider MMR dose  Varicella: Immune  Shingles: recommend Shingrix series  Tdap: received Tdap booster this admission    Dr. Chao Peters will be covering the patient starting from tomorrow. Please reach out to her for further questions and follow up.     Silviano Manuel M.D.  [facility] Division of Infectious Disease  8AM-5PM Monday - Friday: Available on [employer] Teams  After Hours and Holidays (or if no response on [employer] Teams): Please contact the Infectious Diseases Office at [phone]      57 yo M with PMHx of HTN, CAD w/ 1 stent in 2009, ICH (2008) presented on 11/1 with abn ekg. Patient presented to [facility] where he was found to have STEMI, recommended to get cath however patient did not want to get it there so he left and came here.   EKG here with ST depression in lateral leads and elevation in anterior leads   Prior to C found to be tachycardic, dyspneic, intubated   C 11/1 with chronic total occlusion of LAD and RCA, with elevated RA and PA pressures  TTE 11/1 with severely decreased EF 32%, s/p IABP 11/1    RVP (11/1) Positive for COVID19  RVP (11/10) Negative  BCx (11/2, 11/4) NGTD  ETT Culture (11/2) NGTD  MRSA/MSSA PCR (11/2, 11/10) Negative  UA (11/10) 1 WBC    CXR (11/10) Clear Lungs  TTE (11/2) Left ventricular thrombus.    #Positive Blood Cultures (11/30 - Cutibacterium)  Growth this far from procurement raises question of contaminant  Given ID as Cutibacterium skin procurement contaminant is favored  Repeat blood cultures with NGTD  --No absolute ID contraindication to re-activate for transplant.   --Continue to follow CBC with diff  --Continue to follow temperature curve    #Rash  ?Secondary to Cefepime on 11/2?  ?Secondary to hepatitis vaccine?  ?Secondary to Vancomycin  --Appreciate Dermatology input  --Follow LFT's and CBC with Diff (For Eosinophil Counts)    #Enterococcus Bacteremia, Leukocytosis, Pre-Heart Transplant Evaluation Serologies  Concern for either central line or urinary source  CT A/P Noncontrast (11/18) No acute process  s/p ten-days of IV Vancomycin last dose received on 11/27    #Encounter to Vaccinate Patient  COVID19: COVID19 Infection This Admission. Would consider Vaccination in ~3 months  Influenza: declined  Pneumococcal: Recommend PCV20  HAV: received first dose 11/24  HBV: received first dose Heplisav 11/24  MMR: Not Mumps Immune, if >1 month until transplant would consider MMR dose  Varicella: Immune  Shingles: recommend Shingrix series  Tdap: received Tdap booster this admission    Dr. Chao Peters will be covering the patient starting from tomorrow. Please reach out to her for further questions and follow up.     Silviano Manuel M.D.  [facility] Division of Infectious Disease  8AM-5PM Monday - Friday: Available on [employer] Teams  After Hours and Holidays (or if no response on [employer] Teams): Please contact the Infectious Diseases Office at [phone]

## 2023-12-15 NOTE — PROGRESS NOTE ADULT - SUBJECTIVE AND OBJECTIVE BOX
LD VALENCIA  58y Male  MRN:[id number]    Patient is a 58y old  Male who presents with a chief complaint of NSTEMI (01 Nov 2023 20:29)    HPI:  57yo M w/ hx HTN, CAD w/ 1 stent in 2009, ICH (2008) presenting with abn ekg. Patient presented to [facility] where he was found to have STEMI, recommended to get cath however patient did not want to get it there so it left and came here.  Patient initially had cough, congestion, fever, was placed on antibiotics on Sunday.  Started feeling nauseous and had a presyncopal event after which he presented to ED last night.  Had chest pain as well.  Chest pain is midsternal.  Not currently having chest pain.  Received 4 aspirin 30 min pta. (01 Nov 2023 15:11)      Patient seen and evaluated at bedside in CCU. interval events noted    Interval HPI: remain in ccu. IABP in place        PAST MEDICAL & SURGICAL HISTORY:  HTN (hypertension)      CAD (coronary artery disease)  2009; stent      Intracranial hemorrhage  2008      Respiratory arrest  december 1st      Myocardial infarction, unspecified MI type, unspecified artery      History of coronary artery stent placement          REVIEW OF SYSTEMS:  as per hpi     VITALS:   Vital Signs Last 24 Hrs  T(C): 36.7 (15 Dec 2023 09:00), Max: 36.9 (14 Dec 2023 20:00)  T(F): 98.1 (15 Dec 2023 09:00), Max: 98.4 (14 Dec 2023 20:00)  HR: 85 (15 Dec 2023 12:00) (70 - 91)  BP: 108/61 (15 Dec 2023 08:45) (108/61 - 108/61)  BP(mean): 77 (15 Dec 2023 08:45) (77 - 77)  RR: 18 (15 Dec 2023 12:00) (13 - 29)  SpO2: 97% (15 Dec 2023 09:11) (95% - 99%)    Parameters below as of 15 Dec 2023 09:11  Patient On (Oxygen Delivery Method): room air          PHYSICAL EXAM:  GENERAL: NAD, comfortable   HEAD:  Atraumatic, Normocephalic  EYES: EOMI, PERRLA, conjunctiva and sclera clear  NECK: Supple, No JVD   CHEST/LUNG: Clear to auscultation bilaterally; No wheeze  HEART: Regular rate and rhythm; No murmurs, rubs, or gallops  ABDOMEN: Soft, Nontender, Nondistended; Bowel sounds present  EXTREMITIES:  2+ Peripheral Pulses, No clubbing, cyanosis, or edema  NEUROLOGY: nonfocal  SKIN: +rash  +iabp    Consultant(s) Notes Reviewed:  [x ] YES  [ ] NO  Care Discussed with Consultants/Other Providers [ x] YES  [ ] NO    MEDS:   MEDICATIONS  (STANDING):  aspirin  chewable 81 milliGRAM(s) Oral daily  atorvastatin 80 milliGRAM(s) Oral at bedtime  budesonide  80 MICROgram(s)/formoterol 4.5 MICROgram(s) Inhaler 2 Puff(s) Inhalation two times a day  carvedilol 25 milliGRAM(s) Oral every 12 hours  chlorhexidine 2% Cloths 1 Application(s) Topical daily  heparin  Infusion 1300 Unit(s)/Hr (14 mL/Hr) IV Continuous <Continuous>  hydrALAZINE 50 milliGRAM(s) Oral every 8 hours  insulin glargine Injectable (LANTUS) 12 Unit(s) SubCutaneous at bedtime  insulin lispro (ADMELOG) corrective regimen sliding scale   SubCutaneous at bedtime  insulin lispro (ADMELOG) corrective regimen sliding scale   SubCutaneous three times a day before meals  insulin lispro Injectable (ADMELOG) 9 Unit(s) SubCutaneous three times a day before meals  isosorbide   dinitrate Tablet (ISORDIL) 30 milliGRAM(s) Oral three times a day  polyethylene glycol 3350 17 Gram(s) Oral two times a day  senna 2 Tablet(s) Oral at bedtime    MEDICATIONS  (PRN):  hydrOXYzine hydrochloride 25 milliGRAM(s) Oral two times a day PRN Anxiety      ALLERGIES:  penicillins (Unknown)      LABS:                                                      12.5   7.63  )-----------( 163      ( 15 Dec 2023 04:03 )             38.3   12-15    135  |  104  |  30<H>  ----------------------------<  129<H>  4.3   |  19<L>  |  1.05    Ca    9.9      15 Dec 2023 04:03  Phos  3.4     12-15  Mg     1.8     12-15    TPro  7.0  /  Alb  3.9  /  TBili  0.2  /  DBili  x   /  AST  29  /  ALT  117<H>  /  AlkPhos  65  12-15      < from: CT Abdomen and Pelvis No Cont (11.28.23 @ 03:38) >  IMPRESSION:  Mildly dilated colon and prominent but not overly dilated small bowel   with air-fluid levels. No discrete transition point. Findings are   suggestive of ileus.    Intra-aortic balloon pump with the inferior marker at the level of the   inferior mesenteric artery and the balloon overlying the origins of the   renal arteries, celiac artery, and superior mesenteric artery. Consider   repositioning. Concern for IABP positioning was discussed with LANETTE Hawthorne   on 11/28/2023 at 3:42 AM by Dr. Shepard.    < end of copied text >          < from: Colonoscopy (11.17.23 @ 10:35) >                                                                                                        Impression:          - The entire examined colon is normal on direct and retroflexion views.                       - No specimens collected.  Recommendation:      - Resume previous diet today.                       - No large polyps or masses detected, No objection from GI standpoint to                 proceed with heart transplantation/advanced heart therapies.                       - Please call back should any further questions or concerns arise.    < end of copied text >     < from: Xray Chest 1 View- PORTABLE-Urgent (11.01.23 @ 07:42) >    IMPRESSION:  Clear lungs.    ---End of Report ---        < end of copied text >  < from: TTE W or WO Ultrasound Enhancing Agent (11.01.23 @ 10:23) >  _____________________________     CONCLUSIONS:      1. Left ventricular cavity is moderately dilated. Left ventricular wall thickness is normal. Left ventricular systolic function is severely decreased with an ejection fraction of 32 % by Chinchilla's method of disks. Regional wall motion abnormalities present.   2. Multiple segmental abnormalities exist. See findings.   3. There is moderate (grade 2) left ventricular diastolic dysfunction, with indeterminant filling pressure.   4. Normal right ventricular cavity size, wall thickness, and systolic function.   5. No significant valvular disease.   6. No pericardial effusion seen.   7. Compared to the transthoracic echocardiogram performed on 1/25/2017 the areas of akinesis are unchanged but there has been a decline in LV systolic function with new areas of hypokinesis.    __________________________________________________________________    < end of copied text >  < from: TTE Limited W or WO Ultrasound Enhancing Agent (11.02.23 @ 07:41) >  __________________________     CONCLUSIONS:      1. After obtaining consent, Definity ultrasound enhancing agent was given for enhanced left ventricular opacification and improved delineation of the left ventricular endocardial borders. Left ventricular systolic function is severely decreased with a calculated ejection fraction of 22 % by the Chinchilla's biplane method of disks. There is a left ventricular thrombus.   2. Findings were discussed with Litzy BOSS on 11/2/2023 at 8.49am.   3. There is a left ventricular thrombus.    _________________________________________________________________    < end of copied text >   LD VALENCIA  58y Male  MRN:[id number]    Patient is a 58y old  Male who presents with a chief complaint of NSTEMI (01 Nov 2023 20:29)    HPI:  59yo M w/ hx HTN, CAD w/ 1 stent in 2009, ICH (2008) presenting with abn ekg. Patient presented to [facility] where he was found to have STEMI, recommended to get cath however patient did not want to get it there so it left and came here.  Patient initially had cough, congestion, fever, was placed on antibiotics on Sunday.  Started feeling nauseous and had a presyncopal event after which he presented to ED last night.  Had chest pain as well.  Chest pain is midsternal.  Not currently having chest pain.  Received 4 aspirin 30 min pta. (01 Nov 2023 15:11)      Patient seen and evaluated at bedside in CCU. interval events noted    Interval HPI: remain in ccu. IABP in place        PAST MEDICAL & SURGICAL HISTORY:  HTN (hypertension)      CAD (coronary artery disease)  2009; stent      Intracranial hemorrhage  2008      Respiratory arrest  december 1st      Myocardial infarction, unspecified MI type, unspecified artery      History of coronary artery stent placement          REVIEW OF SYSTEMS:  as per hpi     VITALS:   Vital Signs Last 24 Hrs  T(C): 36.7 (15 Dec 2023 09:00), Max: 36.9 (14 Dec 2023 20:00)  T(F): 98.1 (15 Dec 2023 09:00), Max: 98.4 (14 Dec 2023 20:00)  HR: 85 (15 Dec 2023 12:00) (70 - 91)  BP: 108/61 (15 Dec 2023 08:45) (108/61 - 108/61)  BP(mean): 77 (15 Dec 2023 08:45) (77 - 77)  RR: 18 (15 Dec 2023 12:00) (13 - 29)  SpO2: 97% (15 Dec 2023 09:11) (95% - 99%)    Parameters below as of 15 Dec 2023 09:11  Patient On (Oxygen Delivery Method): room air          PHYSICAL EXAM:  GENERAL: NAD, comfortable   HEAD:  Atraumatic, Normocephalic  EYES: EOMI, PERRLA, conjunctiva and sclera clear  NECK: Supple, No JVD   CHEST/LUNG: Clear to auscultation bilaterally; No wheeze  HEART: Regular rate and rhythm; No murmurs, rubs, or gallops  ABDOMEN: Soft, Nontender, Nondistended; Bowel sounds present  EXTREMITIES:  2+ Peripheral Pulses, No clubbing, cyanosis, or edema  NEUROLOGY: nonfocal  SKIN: +rash  +iabp    Consultant(s) Notes Reviewed:  [x ] YES  [ ] NO  Care Discussed with Consultants/Other Providers [ x] YES  [ ] NO    MEDS:   MEDICATIONS  (STANDING):  aspirin  chewable 81 milliGRAM(s) Oral daily  atorvastatin 80 milliGRAM(s) Oral at bedtime  budesonide  80 MICROgram(s)/formoterol 4.5 MICROgram(s) Inhaler 2 Puff(s) Inhalation two times a day  carvedilol 25 milliGRAM(s) Oral every 12 hours  chlorhexidine 2% Cloths 1 Application(s) Topical daily  heparin  Infusion 1300 Unit(s)/Hr (14 mL/Hr) IV Continuous <Continuous>  hydrALAZINE 50 milliGRAM(s) Oral every 8 hours  insulin glargine Injectable (LANTUS) 12 Unit(s) SubCutaneous at bedtime  insulin lispro (ADMELOG) corrective regimen sliding scale   SubCutaneous at bedtime  insulin lispro (ADMELOG) corrective regimen sliding scale   SubCutaneous three times a day before meals  insulin lispro Injectable (ADMELOG) 9 Unit(s) SubCutaneous three times a day before meals  isosorbide   dinitrate Tablet (ISORDIL) 30 milliGRAM(s) Oral three times a day  polyethylene glycol 3350 17 Gram(s) Oral two times a day  senna 2 Tablet(s) Oral at bedtime    MEDICATIONS  (PRN):  hydrOXYzine hydrochloride 25 milliGRAM(s) Oral two times a day PRN Anxiety      ALLERGIES:  penicillins (Unknown)      LABS:                                                      12.5   7.63  )-----------( 163      ( 15 Dec 2023 04:03 )             38.3   12-15    135  |  104  |  30<H>  ----------------------------<  129<H>  4.3   |  19<L>  |  1.05    Ca    9.9      15 Dec 2023 04:03  Phos  3.4     12-15  Mg     1.8     12-15    TPro  7.0  /  Alb  3.9  /  TBili  0.2  /  DBili  x   /  AST  29  /  ALT  117<H>  /  AlkPhos  65  12-15      < from: CT Abdomen and Pelvis No Cont (11.28.23 @ 03:38) >  IMPRESSION:  Mildly dilated colon and prominent but not overly dilated small bowel   with air-fluid levels. No discrete transition point. Findings are   suggestive of ileus.    Intra-aortic balloon pump with the inferior marker at the level of the   inferior mesenteric artery and the balloon overlying the origins of the   renal arteries, celiac artery, and superior mesenteric artery. Consider   repositioning. Concern for IABP positioning was discussed with LANETTE Hawthorne   on 11/28/2023 at 3:42 AM by Dr. Shepard.    < end of copied text >          < from: Colonoscopy (11.17.23 @ 10:35) >                                                                                                        Impression:          - The entire examined colon is normal on direct and retroflexion views.                       - No specimens collected.  Recommendation:      - Resume previous diet today.                       - No large polyps or masses detected, No objection from GI standpoint to                 proceed with heart transplantation/advanced heart therapies.                       - Please call back should any further questions or concerns arise.    < end of copied text >     < from: Xray Chest 1 View- PORTABLE-Urgent (11.01.23 @ 07:42) >    IMPRESSION:  Clear lungs.    ---End of Report ---        < end of copied text >  < from: TTE W or WO Ultrasound Enhancing Agent (11.01.23 @ 10:23) >  _____________________________     CONCLUSIONS:      1. Left ventricular cavity is moderately dilated. Left ventricular wall thickness is normal. Left ventricular systolic function is severely decreased with an ejection fraction of 32 % by Chinchilla's method of disks. Regional wall motion abnormalities present.   2. Multiple segmental abnormalities exist. See findings.   3. There is moderate (grade 2) left ventricular diastolic dysfunction, with indeterminant filling pressure.   4. Normal right ventricular cavity size, wall thickness, and systolic function.   5. No significant valvular disease.   6. No pericardial effusion seen.   7. Compared to the transthoracic echocardiogram performed on 1/25/2017 the areas of akinesis are unchanged but there has been a decline in LV systolic function with new areas of hypokinesis.    __________________________________________________________________    < end of copied text >  < from: TTE Limited W or WO Ultrasound Enhancing Agent (11.02.23 @ 07:41) >  __________________________     CONCLUSIONS:      1. After obtaining consent, Definity ultrasound enhancing agent was given for enhanced left ventricular opacification and improved delineation of the left ventricular endocardial borders. Left ventricular systolic function is severely decreased with a calculated ejection fraction of 22 % by the Chinchilla's biplane method of disks. There is a left ventricular thrombus.   2. Findings were discussed with Litzy BOSS on 11/2/2023 at 8.49am.   3. There is a left ventricular thrombus.    _________________________________________________________________    < end of copied text >

## 2023-12-15 NOTE — PROGRESS NOTE ADULT - ATTENDING COMMENTS
Pt seems depressed. Denies any cardiac symptoms.   Appears euvolemic.   GDMT downtitrated a few days ago due to low BP.  Remains critically ill on IABP and afterload reducing agents.  Listed for heart tx UNOS status 2.

## 2023-12-15 NOTE — PROGRESS NOTE ADULT - ASSESSMENT
57 yo male h/o htn, cad s/p pci, ICH, here with NSTEMI  s/p intubation and cath. now in CCU    NSTEMI  s/p  cath  cath results noted. multi vessel dz., CTSx f/u.   hep gtt  pt with vtach/fib arrest again on 11/8. s/p ACLS and ROSC.   now extubated  IABP in place  mngt as per CCU  plan for transplant as per HF and transplant team  transplant w/u ongoing.   s/p colonoscopy 11/17. normal  now listed for transplant as of 11/22    LV thrombus   hep gtt    acute on chronic systolic heart failure  heart failure team f/u    pna  resolved     covid  resolved  now off isolation     h/o ICH  resolved    agitation  psy consult f/u    bacteremia  id following  iv abx  f/u repeat cult  - ngtd    vomiting and abd pain  ileus on CT  bowel regimen  gi f/u  +bm    now resolved.     IABP malposition on CT  mngt as per ccu. iabp adjusted    rash  possible drug reaction  derm f/u  improving    mngt as per CCU team          Advanced care planning was discussed with patient and family.  Advanced care planning forms were reviewed and discussed as appropriate.  Differential diagnosis and plan of care discussed with patient after the evaluation.   Pain assessed and judicious use of narcotics when appropriate was discussed.  Importance of Fall prevention discussed.  Counseling on Smoking and Alcohol cessation was offered when appropriate.  Counseling on Diet, exercise, and medication compliance was done.       Approx 75 minutes spent. 59 yo male h/o htn, cad s/p pci, ICH, here with NSTEMI  s/p intubation and cath. now in CCU    NSTEMI  s/p  cath  cath results noted. multi vessel dz., CTSx f/u.   hep gtt  pt with vtach/fib arrest again on 11/8. s/p ACLS and ROSC.   now extubated  IABP in place  mngt as per CCU  plan for transplant as per HF and transplant team  transplant w/u ongoing.   s/p colonoscopy 11/17. normal  now listed for transplant as of 11/22    LV thrombus   hep gtt    acute on chronic systolic heart failure  heart failure team f/u    pna  resolved     covid  resolved  now off isolation     h/o ICH  resolved    agitation  psy consult f/u    bacteremia  id following  iv abx  f/u repeat cult  - ngtd    vomiting and abd pain  ileus on CT  bowel regimen  gi f/u  +bm    now resolved.     IABP malposition on CT  mngt as per ccu. iabp adjusted    rash  possible drug reaction  derm f/u  improving    mngt as per CCU team          Advanced care planning was discussed with patient and family.  Advanced care planning forms were reviewed and discussed as appropriate.  Differential diagnosis and plan of care discussed with patient after the evaluation.   Pain assessed and judicious use of narcotics when appropriate was discussed.  Importance of Fall prevention discussed.  Counseling on Smoking and Alcohol cessation was offered when appropriate.  Counseling on Diet, exercise, and medication compliance was done.       Approx 75 minutes spent.

## 2023-12-15 NOTE — PROGRESS NOTE ADULT - ASSESSMENT
====================ASSESSMENT ==============  58 male with HTN, CAD (s/p PCI 2008), HFrEF, CVA 2018, and T2DM presenting with chest pressure and unknown tachycardia that was shocked x1, [facility] 11/1 found to have in-stent restenosis of pLAD and  of RCA with elevated RA and PA pressures and severely decreased. Admitted to CICU for management of cardiogenic shock and ADHF requiring IABP 11/1 -11/7, with hospital course c/b vfib arrest requiring reinsertion of IABP. Not recommended for ATs per HF. Currently listed for transplant status 2.    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 AT evaluation launched 11/10, he is ABO A. He was discussed during TSC on 11/16 and was approved for listing, however was found to be Bacteremic with 11/17 Blc Cx growing mixed cultures Staph epi and Enterococcus faecalis and started on IV Vanco. Repeat cultures from 11/18 reveal NGTD and therefore was cleared by ID for listing.     He appears adequately supported on IABP at 1:1, electrically quiescent on oral Amiodarone. Cr is improving with holding diuretics. Labs otherwise notable for worsening thrombocytopenia. Awaiting suitable donor. Now with GM + rods in blood culture on 11/30 (reported on 12/4), restarted on vancomycin, and status 7, repeat cultures now negative x48 hours (12/6), now status 2 again.       ====================== NEUROLOGY=====================  Anxiety  - No Seroquel/antipsychotics since vfib arrest and prolonged QTC  - Psych Eval, recommended SSRI, but pt. refused   - Psych recommending atarax PRN  - Continue to monitor mental status    PT/Conditioning  - Continue band exercises while on bedrest s/t IABP    ==================== RESPIRATORY======================  Acute Hypoxemic Respiratory Failure  - s/p x2 intubations for cardiogenic pulm edema and the in setting of cardiac arrest, resolved - extubated 11/10  - Currently maintaining >95% sats on room air  - Continue incentive spirometry and monitoring of sp02    Asthma  - c/w albuterol, symbicort and spiriva  - On trelegy at home  - Continue to monitor SpO2 with goal >94%    ====================CARDIOVASCULAR==================  Vfib arrest i/s/o ischemia  - Lido gtt off 11/13  - PO Amio load - total of 5g per EP complete 11/17  - Amio dc'd 12/5 for rising LFTs  - Keep K > 4, Mag > 2.2     Cardiogenic shock requiring IABP (11/1- 11/7, 11/9-)  - Likely 2/2 NSTEMI and ADHF  - 11/1 LHC: pLAD 100 % in-stent restenosis & mRCA, 100 %. PCWP 30. IABP placed.  - 11/1 TTE: LV dilated. EF 32 %. Regional WMAs present, mod (grade 2) LV diastolic dysfunction  - 11/2 TTE: EF 22% and + LV thrombus  - IABP swapped 11/20 to RFA, continue 1:1 support - switched sensor to R radial a-line 12/13 due to poor sensing on groin line  - Off Milrinone gtt @ 7:30 am 11/11  - [place] d/c'd due to elevated K levels  - c/w coreg 25 BID for GDMT  - UNOS status 2 as of 12/6  - hydralazine 50 TID and ISD 30 TID for AL reduction    NSTEMI iso stent re-occlusion of pLAD and 100%  of RCA  - EKG on admission w/ LBBB  - DAPT: c/w ASA, Brilinta d/c'd per transplant w/u  - c/w lipitor 80  - cMR deferred given necessity of IABP  - CT sx not recommending CABG, undergoing AT eval    LV thrombus  - c/w heparin gtt w/ therapeutic PTT    ===================== RENAL =========================  Non-oliguric ARIC, resolved   - Baseline Cr: 1-1.22  - Renal US: no evidence of renal artery stenosis  - Trend BMP, lytes daily, replace as needed  - Continue Strict I/Os, avoid nephrotoxins    Mild Hyponatremia/Hyperkalemia iso ARIC  - Continue fluid restriction   - Urea powder d/c'd per renal  - Trend daily  - Continue monitoring urine output, lytes, SCr/ BUN  - Replete lytes prn with goal K >4 and Mg >2    =============== GASTROINTESTINAL===================  Constipation/ileus, resolved  - regular diet  - bowel reg prn    ===================ENDO====================  Type 2 DM  - A1c 8.3   - Continue lantus, premeal, low ISS  - continue FS    ===================HEMATOLOGIC/ONC ===================  - H/H & plts stable  - Monitor H/H and plts  - VTE PPX: heparin gtt    ==================INFECTIOUS DISEASE================  # Positive blood culture, resolved  - Repeat BCx drawn 11/30 for leukocytosis to 12k - positive on 12/4, G+ rods in anaerobic bottle, suspect contaminant  - Repeat cultures x2 sent 12/4 per transplant ID recs - no growth to date  - Vancomycin by trough (12/4-12/6)  - Final microbial ID: Cutibacterium acnes, per ID this is likely to be a skin contaminant, vanc dc'd.   - Dental eval 12/7 to rule out infectious etiology that would have led to bacteremia, no significant findings on exam.     # Enterococcus faecalis bacteremia, resolved  - BCx 11/17+ for enterococcus faecalis x2, Staph epi x1 (likely contaminant)- pan sensitive   - Urine cx 11/18 + enterococcus faecalis  - BCx 11/18 no growth   - IABP site swapped to RFA 11/20  - s/p Vancomycin 1g q12h (11/18-11/27)  - CT A/P negative for infectious pathology    # COVID, resolved  - Off airborne precautions 11/11    # Pre-transplant ID w/u   - Trend ID recs for serologies   - Colonoscopy 11/17 - normal   - Chest CT 11/17 - improved LLL aeration  - s/p immunizations    ==================DERMATOLOGY================  # Upper Extremity Rash  - Patient developed bilateral upper extremity rash after receiving Hepatitis A & B immunizations on 11/24  - Dermatology consulted 12/5 - not likely to be related to vanco  - Recommend clobetasol 0.05% BID to affected areas   - RVP repeated to rule out viral etiology, negative   ====================ASSESSMENT ==============  58 male with HTN, CAD (s/p PCI 2008), HFrEF, CVA 2018, and T2DM presenting with chest pressure and unknown tachycardia that was shocked x1, [facility] 11/1 found to have in-stent restenosis of pLAD and  of RCA with elevated RA and PA pressures and severely decreased. Admitted to CICU for management of cardiogenic shock and ADHF requiring IABP 11/1 -11/7, with hospital course c/b vfib arrest requiring reinsertion of IABP. Not recommended for ATs per HF. Currently listed for transplant status 2.    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 AT evaluation launched 11/10, he is ABO A. He was discussed during TSC on 11/16 and was approved for listing, however was found to be Bacteremic with 11/17 Blc Cx growing mixed cultures Staph epi and Enterococcus faecalis and started on IV Vanco. Repeat cultures from 11/18 reveal NGTD and therefore was cleared by ID for listing.     He appears adequately supported on IABP at 1:1, electrically quiescent on oral Amiodarone. Cr is improving with holding diuretics. Labs otherwise notable for worsening thrombocytopenia. Awaiting suitable donor. Now with GM + rods in blood culture on 11/30 (reported on 12/4), restarted on vancomycin, and status 7, repeat cultures now negative x48 hours (12/6), now status 2 again.       ====================== NEUROLOGY=====================  Anxiety  - No Seroquel/antipsychotics since vfib arrest and prolonged QTC  - Psych Eval, recommended SSRI, but pt. refused   - Psych recommending atarax PRN  - Continue to monitor mental status    PT/Conditioning  - Continue band exercises while on bedrest s/t IABP    ==================== RESPIRATORY======================  Acute Hypoxemic Respiratory Failure  - s/p x2 intubations for cardiogenic pulm edema and the in setting of cardiac arrest, resolved - extubated 11/10  - Currently maintaining >95% sats on room air  - Continue incentive spirometry and monitoring of sp02    Asthma  - c/w albuterol, symbicort and spiriva  - On trelegy at home  - Continue to monitor SpO2 with goal >94%    ====================CARDIOVASCULAR==================  Vfib arrest i/s/o ischemia  - Lido gtt off 11/13  - PO Amio load - total of 5g per EP complete 11/17  - Amio dc'd 12/5 for rising LFTs  - Keep K > 4, Mag > 2.2     Cardiogenic shock requiring IABP (11/1- 11/7, 11/9-)  - Likely 2/2 NSTEMI and ADHF  - 11/1 LHC: pLAD 100 % in-stent restenosis & mRCA, 100 %. PCWP 30. IABP placed.  - 11/1 TTE: LV dilated. EF 32 %. Regional WMAs present, mod (grade 2) LV diastolic dysfunction  - 11/2 TTE: EF 22% and + LV thrombus  - IABP swapped 11/20 to RFA, continue 1:1 support - switched sensor to R radial a-line 12/13 due to poor sensing on groin line  - Off Milrinone gtt @ 7:30 am 11/11  - [place] d/c'd due to elevated K levels  - c/w coreg 25 BID for GDMT  - UNOS status 2 as of 12/6  - hydralazine 50 TID and ISD 30 TID for AL reduction    NSTEMI iso stent re-occlusion of pLAD and 100%  of RCA  - EKG on admission w/ LBBB  - DAPT: c/w ASA, Brilinta d/c'd per transplant w/u  - c/w lipitor 80  - cMR deferred given necessity of IABP  - CT sx not recommending CABG, undergoing AT eval    LV thrombus  - c/w heparin gtt w/ therapeutic PTT    ===================== RENAL =========================  Non-oliguric ARIC, resolved   - Baseline Cr: 1-1.22  - Renal US: no evidence of renal artery stenosis  - Trend BMP, lytes daily, replace as needed  - Continue Strict I/Os, avoid nephrotoxins    Mild Hyponatremia/Hyperkalemia iso ARIC  - Continue fluid restriction   - Urea powder d/c'd per renal  - Trend daily  - Continue monitoring urine output, lytes, SCr/ BUN  - Replete lytes prn with goal K >4 and Mg >2    =============== GASTROINTESTINAL===================  Constipation/ileus, resolved  - regular diet  - bowel reg prn    ===================ENDO====================  Type 2 DM  - A1c 8.3   - Continue lantus, premeal, low ISS  - continue FS    ===================HEMATOLOGIC/ONC ===================  - H/H & plts stable  - Monitor H/H and plts  - VTE PPX: heparin gtt    ==================INFECTIOUS DISEASE================  # Positive blood culture, resolved  - Repeat BCx drawn 11/30 for leukocytosis to 12k - positive on 12/4, G+ rods in anaerobic bottle, suspect contaminant  - Repeat cultures x2 sent 12/4 per transplant ID recs - no growth to date  - Vancomycin by trough (12/4-12/6)  - Final microbial ID: Cutibacterium acnes, per ID this is likely to be a skin contaminant, vanc dc'd.   - Dental eval 12/7 to rule out infectious etiology that would have led to bacteremia, no significant findings on exam.     # Enterococcus faecalis bacteremia, resolved  - BCx 11/17+ for enterococcus faecalis x2, Staph epi x1 (likely contaminant)- pan sensitive   - Urine cx 11/18 + enterococcus faecalis  - BCx 11/18 no growth   - IABP site swapped to RFA 11/20  - s/p Vancomycin 1g q12h (11/18-11/27)  - CT A/P negative for infectious pathology    # COVID, resolved  - Off airborne precautions 11/11    # Pre-transplant ID w/u   - Trend ID recs for serologies   - Colonoscopy 11/17 - normal   - Chest CT 11/17 - improved LLL aeration  - s/p immunizations    ==================DERMATOLOGY================  # Upper Extremity Rash  - Patient developed bilateral upper extremity rash after receiving Hepatitis A & B immunizations on 11/24  - Dermatology consulted 12/5 - not likely to be related to vanco  - Recommend clobetasol 0.05% BID to affected areas   - RVP repeated to rule out viral etiology, negative

## 2023-12-15 NOTE — PROGRESS NOTE ADULT - SUBJECTIVE AND OBJECTIVE BOX
Patient seen and examined at bedside.    Overnight Events: No acute events overnight. Denies chest pain or shortness of breath. Rash on Upper extremities improving.       REVIEW OF SYSTEMS:  Constitutional:     [ x] negative [ ] fevers [ ] chills [ ] weight loss [ ] weight gain  HEENT:                  [ x] negative [ ] dry eyes [ ] eye irritation [ ] postnasal drip [ ] nasal congestion  CV:                         [x ] negative  [ ] chest pain [ ] orthopnea [ ] palpitations [ ] murmur  Resp:                     [ x] negative [ ] cough [ ] shortness of breath [ ] dyspnea [ ] wheezing [ ] sputum [ ]hemoptysis  GI:                          [ x] negative [ ] nausea [ ] vomiting [ ] diarrhea [ ] constipation [ ] abd pain [ ] dysphagia   :                        [ x] negative [ ] dysuria [ ] nocturia [ ] hematuria [ ] increased urinary frequency  Musculoskeletal: [ x] negative [ ] back pain [ ] myalgias [ ] arthralgias [ ] fracture  Skin:                       [ x] negative [ ] rash [ ] itch  Neurological:        [ x] negative [ ] headache [ ] dizziness [ ] syncope [ ] weakness [ ] numbness  Psychiatric:           [ x] negative [ ] anxiety [ ] depression  Endocrine:            [ x] negative [ ] diabetes [ ] thyroid problem  Heme/Lymph:      [ x] negative [ ] anemia [ ] bleeding problem  Allergic/Immune: [x ] negative [ ] itchy eyes [ ] nasal discharge [ ] hives [ ] angioedema    [x ] All other systems negative  [ ] Unable to assess ROS due to    Current Meds:  aspirin  chewable 81 milliGRAM(s) Oral daily  atorvastatin 80 milliGRAM(s) Oral at bedtime  budesonide  80 MICROgram(s)/formoterol 4.5 MICROgram(s) Inhaler 2 Puff(s) Inhalation two times a day  carvedilol 25 milliGRAM(s) Oral every 12 hours  chlorhexidine 2% Cloths 1 Application(s) Topical daily  heparin  Infusion 1300 Unit(s)/Hr IV Continuous <Continuous>  hydrALAZINE 50 milliGRAM(s) Oral every 8 hours  hydrOXYzine hydrochloride 25 milliGRAM(s) Oral two times a day PRN  insulin glargine Injectable (LANTUS) 12 Unit(s) SubCutaneous at bedtime  insulin lispro (ADMELOG) corrective regimen sliding scale   SubCutaneous three times a day before meals  insulin lispro (ADMELOG) corrective regimen sliding scale   SubCutaneous at bedtime  insulin lispro Injectable (ADMELOG) 9 Unit(s) SubCutaneous three times a day before meals  isosorbide   dinitrate Tablet (ISORDIL) 30 milliGRAM(s) Oral three times a day  polyethylene glycol 3350 17 Gram(s) Oral two times a day  senna 2 Tablet(s) Oral at bedtime      PAST MEDICAL & SURGICAL HISTORY:  HTN (hypertension)      CAD (coronary artery disease)  2009; stent      Intracranial hemorrhage  2008      Respiratory arrest  december 1st      Myocardial infarction, unspecified MI type, unspecified artery      History of coronary artery stent placement          Vitals:  T(F): 98.1 (12-15), Max: 98.4 (12-14)  HR: 87 (12-15) (70 - 91)  BP: 108/61 (12-15) (108/61 - 108/61)  RR: 22 (12-15)  SpO2: 97% (12-15)  I&O's Summary    14 Dec 2023 07:01  -  15 Dec 2023 07:00  --------------------------------------------------------  IN: 696 mL / OUT: 2375 mL / NET: -1679 mL    15 Dec 2023 07:01  -  15 Dec 2023 11:20  --------------------------------------------------------  IN: 356 mL / OUT: 0 mL / NET: 356 mL        Physical Exam:  Appearance: No acute distress; well appearing  Eyes: EOMI, no scleral icterus   HEENT: Normal oral mucosa  Cardiovascular: RRR, S1, S2, no murmurs, rubs, or gallops; no edema; no JVD. IABP in place.   Respiratory: Clear to auscultation bilaterally, no auditory stridor   Gastrointestinal: soft, non-tender, non-distended with normal bowel sounds  Musculoskeletal: No clubbing; no joint deformity   Neurologic: Moving all 4 extremities spontaneously, sensation grossly intact  Psychiatry: AAOx3, mood & affect appropriate  Skin: No rashes, ecchymoses, or cyanosis                          12.5   7.63  )-----------( 163      ( 15 Dec 2023 04:03 )             38.3     12-15    135  |  104  |  30<H>  ----------------------------<  129<H>  4.3   |  19<L>  |  1.05    Ca    9.9      15 Dec 2023 04:03  Phos  3.4     12-15  Mg     1.8     12-15    TPro  7.0  /  Alb  3.9  /  TBili  0.2  /  DBili  x   /  AST  29  /  ALT  117<H>  /  AlkPhos  65  12-15    PT/INR - ( 15 Dec 2023 04:03 )   PT: 11.4 sec;   INR: 1.09 ratio         PTT - ( 15 Dec 2023 04:03 )  PTT:61.9 sec

## 2023-12-15 NOTE — PROGRESS NOTE ADULT - PROBLEM SELECTOR PLAN 1
-Listen UNOS status 2.   - L IABP at 1:1, placed 11/9. Exchange to LFA given high grade bacteremia on 11/20. On AC with Heparin infusion (also for LV thrombus)  - Continue Coreg 25 mg BID hold for MAP <65  - Continue HDZN 50 mg TID and ISDN 30 mg TID, hold for MAP <65  - Off romel given HyperK  - AT evaluation: Accepted for transplant, currently listed UNOS Status 2. ABO A. PRA 0% 12/12.    However, downgraded to status 7 on 12/4 given + blood culture. But now re-upgraded to status 2.   - Please keep primary Dr. Banuelos [phone] in the loop per family request. -Listen UNOS status 2.   - L IABP at 1:1, placed 11/9. Exchange to LFA given high grade bacteremia on 11/20. On AC with Heparin infusion (also for LV thrombus)  - Continue Coreg 25 mg BID hold for MAP <65  - Continue HDZN 50 mg TID and ISDN 30 mg TID, hold for MAP <65  - Off romel given HyperK  - AT evaluation: Accepted for transplant, currently listed UNOS Status 2. ABO A. PRA 0% 12/12.    However, downgraded to status 7 on 12/4 given + blood culture. But now re-upgraded to status 2.   - Please keep primary Dr. Banuelos [phone] in the loop per family request.

## 2023-12-16 LAB
GLUCOSE BLDC GLUCOMTR-MCNC: 119 MG/DL — HIGH (ref 70–99)
GLUCOSE BLDC GLUCOMTR-MCNC: 119 MG/DL — HIGH (ref 70–99)
GLUCOSE BLDC GLUCOMTR-MCNC: 134 MG/DL — HIGH (ref 70–99)
GLUCOSE BLDC GLUCOMTR-MCNC: 134 MG/DL — HIGH (ref 70–99)
GLUCOSE BLDC GLUCOMTR-MCNC: 156 MG/DL — HIGH (ref 70–99)
GLUCOSE BLDC GLUCOMTR-MCNC: 156 MG/DL — HIGH (ref 70–99)
GLUCOSE BLDC GLUCOMTR-MCNC: 180 MG/DL — HIGH (ref 70–99)
GLUCOSE BLDC GLUCOMTR-MCNC: 180 MG/DL — HIGH (ref 70–99)

## 2023-12-16 PROCEDURE — 93010 ELECTROCARDIOGRAM REPORT: CPT

## 2023-12-16 PROCEDURE — 99292 CRITICAL CARE ADDL 30 MIN: CPT | Mod: 25

## 2023-12-16 PROCEDURE — 99291 CRITICAL CARE FIRST HOUR: CPT | Mod: 25

## 2023-12-16 PROCEDURE — 99291 CRITICAL CARE FIRST HOUR: CPT

## 2023-12-16 PROCEDURE — 99292 CRITICAL CARE ADDL 30 MIN: CPT

## 2023-12-16 PROCEDURE — 71045 X-RAY EXAM CHEST 1 VIEW: CPT | Mod: 26

## 2023-12-16 RX ADMIN — Medication 9 UNIT(S): at 16:56

## 2023-12-16 RX ADMIN — BUDESONIDE AND FORMOTEROL FUMARATE DIHYDRATE 2 PUFF(S): 160; 4.5 AEROSOL RESPIRATORY (INHALATION) at 10:28

## 2023-12-16 RX ADMIN — Medication 50 MILLIGRAM(S): at 14:02

## 2023-12-16 RX ADMIN — Medication 9 UNIT(S): at 12:42

## 2023-12-16 RX ADMIN — Medication 9 UNIT(S): at 08:21

## 2023-12-16 RX ADMIN — CHLORHEXIDINE GLUCONATE 1 APPLICATION(S): 213 SOLUTION TOPICAL at 21:12

## 2023-12-16 RX ADMIN — ISOSORBIDE DINITRATE 30 MILLIGRAM(S): 5 TABLET ORAL at 11:11

## 2023-12-16 RX ADMIN — HEPARIN SODIUM 14 UNIT(S)/HR: 5000 INJECTION INTRAVENOUS; SUBCUTANEOUS at 21:06

## 2023-12-16 RX ADMIN — INSULIN GLARGINE 12 UNIT(S): 100 INJECTION, SOLUTION SUBCUTANEOUS at 21:06

## 2023-12-16 RX ADMIN — Medication 50 MILLIGRAM(S): at 05:21

## 2023-12-16 RX ADMIN — CARVEDILOL PHOSPHATE 25 MILLIGRAM(S): 80 CAPSULE, EXTENDED RELEASE ORAL at 17:02

## 2023-12-16 RX ADMIN — Medication 81 MILLIGRAM(S): at 11:11

## 2023-12-16 RX ADMIN — ISOSORBIDE DINITRATE 30 MILLIGRAM(S): 5 TABLET ORAL at 05:21

## 2023-12-16 RX ADMIN — CARVEDILOL PHOSPHATE 25 MILLIGRAM(S): 80 CAPSULE, EXTENDED RELEASE ORAL at 05:21

## 2023-12-16 RX ADMIN — ISOSORBIDE DINITRATE 30 MILLIGRAM(S): 5 TABLET ORAL at 15:38

## 2023-12-16 RX ADMIN — ATORVASTATIN CALCIUM 80 MILLIGRAM(S): 80 TABLET, FILM COATED ORAL at 21:06

## 2023-12-16 RX ADMIN — Medication 1: at 16:54

## 2023-12-16 RX ADMIN — Medication 50 MILLIGRAM(S): at 21:06

## 2023-12-16 NOTE — PROGRESS NOTE ADULT - SUBJECTIVE AND OBJECTIVE BOX
LD VALENCIA  58y Male  MRN:[id number]    Patient is a 58y old  Male who presents with a chief complaint of NSTEMI (01 Nov 2023 20:29)    HPI:  57yo M w/ hx HTN, CAD w/ 1 stent in 2009, ICH (2008) presenting with abn ekg. Patient presented to [facility] where he was found to have STEMI, recommended to get cath however patient did not want to get it there so it left and came here.  Patient initially had cough, congestion, fever, was placed on antibiotics on Sunday.  Started feeling nauseous and had a presyncopal event after which he presented to ED last night.  Had chest pain as well.  Chest pain is midsternal.  Not currently having chest pain.  Received 4 aspirin 30 min pta. (01 Nov 2023 15:11)      Patient seen and evaluated at bedside in CCU. interval events noted    Interval HPI: remain in ccu. IABP in place        PAST MEDICAL & SURGICAL HISTORY:  HTN (hypertension)      CAD (coronary artery disease)  2009; stent      Intracranial hemorrhage  2008      Respiratory arrest  december 1st      Myocardial infarction, unspecified MI type, unspecified artery      History of coronary artery stent placement          REVIEW OF SYSTEMS:  as per hpi     VITALS:   ICU Vital Signs Last 24 Hrs  T(C): 36.6 (16 Dec 2023 14:00), Max: 37.1 (16 Dec 2023 07:00)  T(F): 97.8 (16 Dec 2023 14:00), Max: 98.8 (16 Dec 2023 07:00)  HR: 82 (16 Dec 2023 20:00) (75 - 91)  BP: 94/48 (16 Dec 2023 07:00) (94/48 - 94/48)  BP(mean): 63 (16 Dec 2023 07:00) (63 - 63)  ABP: --  ABP(mean): --  RR: 22 (16 Dec 2023 20:00) (12 - 26)  SpO2: 97% (16 Dec 2023 17:00) (94% - 97%)    O2 Parameters below as of 16 Dec 2023 19:00  Patient On (Oxygen Delivery Method): room air                PHYSICAL EXAM:  GENERAL: NAD, comfortable   HEAD:  Atraumatic, Normocephalic  EYES: EOMI, PERRLA, conjunctiva and sclera clear  NECK: Supple, No JVD   CHEST/LUNG: Clear to auscultation bilaterally; No wheeze  HEART: Regular rate and rhythm; No murmurs, rubs, or gallops  ABDOMEN: Soft, Nontender, Nondistended; Bowel sounds present  EXTREMITIES:  2+ Peripheral Pulses, No clubbing, cyanosis, or edema  NEUROLOGY: nonfocal  SKIN: +rash  +iabp    Consultant(s) Notes Reviewed:  [x ] YES  [ ] NO  Care Discussed with Consultants/Other Providers [ x] YES  [ ] NO    MEDS:   MEDICATIONS  (STANDING):  aspirin  chewable 81 milliGRAM(s) Oral daily  atorvastatin 80 milliGRAM(s) Oral at bedtime  budesonide  80 MICROgram(s)/formoterol 4.5 MICROgram(s) Inhaler 2 Puff(s) Inhalation two times a day  carvedilol 25 milliGRAM(s) Oral every 12 hours  chlorhexidine 2% Cloths 1 Application(s) Topical daily  heparin  Infusion 1300 Unit(s)/Hr (14 mL/Hr) IV Continuous <Continuous>  hydrALAZINE 50 milliGRAM(s) Oral every 8 hours  insulin glargine Injectable (LANTUS) 12 Unit(s) SubCutaneous at bedtime  insulin lispro (ADMELOG) corrective regimen sliding scale   SubCutaneous at bedtime  insulin lispro (ADMELOG) corrective regimen sliding scale   SubCutaneous three times a day before meals  insulin lispro Injectable (ADMELOG) 9 Unit(s) SubCutaneous three times a day before meals  isosorbide   dinitrate Tablet (ISORDIL) 30 milliGRAM(s) Oral three times a day  polyethylene glycol 3350 17 Gram(s) Oral two times a day  senna 2 Tablet(s) Oral at bedtime    MEDICATIONS  (PRN):  hydrOXYzine hydrochloride 25 milliGRAM(s) Oral two times a day PRN Anxiety      ALLERGIES:  penicillins (Unknown)      LABS:                                                    12.5   7.63  )-----------( 163      ( 15 Dec 2023 04:03 )             38.3   12-15    135  |  104  |  30<H>  ----------------------------<  129<H>  4.3   |  19<L>  |  1.05    Ca    9.9      15 Dec 2023 04:03  Phos  3.4     12-15  Mg     1.8     12-15    TPro  7.0  /  Alb  3.9  /  TBili  0.2  /  DBili  x   /  AST  29  /  ALT  117<H>  /  AlkPhos  65  12-15      < from: CT Abdomen and Pelvis No Cont (11.28.23 @ 03:38) >  IMPRESSION:  Mildly dilated colon and prominent but not overly dilated small bowel   with air-fluid levels. No discrete transition point. Findings are   suggestive of ileus.    Intra-aortic balloon pump with the inferior marker at the level of the   inferior mesenteric artery and the balloon overlying the origins of the   renal arteries, celiac artery, and superior mesenteric artery. Consider   repositioning. Concern for IABP positioning was discussed with LANETTE Hawthorne   on 11/28/2023 at 3:42 AM by Dr. Shepard.    < end of copied text >          < from: Colonoscopy (11.17.23 @ 10:35) >                                                                                                        Impression:          - The entire examined colon is normal on direct and retroflexion views.                       - No specimens collected.  Recommendation:      - Resume previous diet today.                       - No large polyps or masses detected, No objection from GI standpoint to                 proceed with heart transplantation/advanced heart therapies.                       - Please call back should any further questions or concerns arise.    < end of copied text >     < from: Xray Chest 1 View- PORTABLE-Urgent (11.01.23 @ 07:42) >    IMPRESSION:  Clear lungs.    ---End of Report ---        < end of copied text >  < from: TTE W or WO Ultrasound Enhancing Agent (11.01.23 @ 10:23) >  _____________________________     CONCLUSIONS:      1. Left ventricular cavity is moderately dilated. Left ventricular wall thickness is normal. Left ventricular systolic function is severely decreased with an ejection fraction of 32 % by Chinchilla's method of disks. Regional wall motion abnormalities present.   2. Multiple segmental abnormalities exist. See findings.   3. There is moderate (grade 2) left ventricular diastolic dysfunction, with indeterminant filling pressure.   4. Normal right ventricular cavity size, wall thickness, and systolic function.   5. No significant valvular disease.   6. No pericardial effusion seen.   7. Compared to the transthoracic echocardiogram performed on 1/25/2017 the areas of akinesis are unchanged but there has been a decline in LV systolic function with new areas of hypokinesis.    __________________________________________________________________    < end of copied text >  < from: TTE Limited W or WO Ultrasound Enhancing Agent (11.02.23 @ 07:41) >  __________________________     CONCLUSIONS:      1. After obtaining consent, Definity ultrasound enhancing agent was given for enhanced left ventricular opacification and improved delineation of the left ventricular endocardial borders. Left ventricular systolic function is severely decreased with a calculated ejection fraction of 22 % by the Chinchilla's biplane method of disks. There is a left ventricular thrombus.   2. Findings were discussed with Litzy BOSS on 11/2/2023 at 8.49am.   3. There is a left ventricular thrombus.    _________________________________________________________________    < end of copied text >   LD VALENCIA  58y Male  MRN:[id number]    Patient is a 58y old  Male who presents with a chief complaint of NSTEMI (01 Nov 2023 20:29)    HPI:  59yo M w/ hx HTN, CAD w/ 1 stent in 2009, ICH (2008) presenting with abn ekg. Patient presented to [facility] where he was found to have STEMI, recommended to get cath however patient did not want to get it there so it left and came here.  Patient initially had cough, congestion, fever, was placed on antibiotics on Sunday.  Started feeling nauseous and had a presyncopal event after which he presented to ED last night.  Had chest pain as well.  Chest pain is midsternal.  Not currently having chest pain.  Received 4 aspirin 30 min pta. (01 Nov 2023 15:11)      Patient seen and evaluated at bedside in CCU. interval events noted    Interval HPI: remain in ccu. IABP in place        PAST MEDICAL & SURGICAL HISTORY:  HTN (hypertension)      CAD (coronary artery disease)  2009; stent      Intracranial hemorrhage  2008      Respiratory arrest  december 1st      Myocardial infarction, unspecified MI type, unspecified artery      History of coronary artery stent placement          REVIEW OF SYSTEMS:  as per hpi     VITALS:   ICU Vital Signs Last 24 Hrs  T(C): 36.6 (16 Dec 2023 14:00), Max: 37.1 (16 Dec 2023 07:00)  T(F): 97.8 (16 Dec 2023 14:00), Max: 98.8 (16 Dec 2023 07:00)  HR: 82 (16 Dec 2023 20:00) (75 - 91)  BP: 94/48 (16 Dec 2023 07:00) (94/48 - 94/48)  BP(mean): 63 (16 Dec 2023 07:00) (63 - 63)  ABP: --  ABP(mean): --  RR: 22 (16 Dec 2023 20:00) (12 - 26)  SpO2: 97% (16 Dec 2023 17:00) (94% - 97%)    O2 Parameters below as of 16 Dec 2023 19:00  Patient On (Oxygen Delivery Method): room air                PHYSICAL EXAM:  GENERAL: NAD, comfortable   HEAD:  Atraumatic, Normocephalic  EYES: EOMI, PERRLA, conjunctiva and sclera clear  NECK: Supple, No JVD   CHEST/LUNG: Clear to auscultation bilaterally; No wheeze  HEART: Regular rate and rhythm; No murmurs, rubs, or gallops  ABDOMEN: Soft, Nontender, Nondistended; Bowel sounds present  EXTREMITIES:  2+ Peripheral Pulses, No clubbing, cyanosis, or edema  NEUROLOGY: nonfocal  SKIN: +rash  +iabp    Consultant(s) Notes Reviewed:  [x ] YES  [ ] NO  Care Discussed with Consultants/Other Providers [ x] YES  [ ] NO    MEDS:   MEDICATIONS  (STANDING):  aspirin  chewable 81 milliGRAM(s) Oral daily  atorvastatin 80 milliGRAM(s) Oral at bedtime  budesonide  80 MICROgram(s)/formoterol 4.5 MICROgram(s) Inhaler 2 Puff(s) Inhalation two times a day  carvedilol 25 milliGRAM(s) Oral every 12 hours  chlorhexidine 2% Cloths 1 Application(s) Topical daily  heparin  Infusion 1300 Unit(s)/Hr (14 mL/Hr) IV Continuous <Continuous>  hydrALAZINE 50 milliGRAM(s) Oral every 8 hours  insulin glargine Injectable (LANTUS) 12 Unit(s) SubCutaneous at bedtime  insulin lispro (ADMELOG) corrective regimen sliding scale   SubCutaneous at bedtime  insulin lispro (ADMELOG) corrective regimen sliding scale   SubCutaneous three times a day before meals  insulin lispro Injectable (ADMELOG) 9 Unit(s) SubCutaneous three times a day before meals  isosorbide   dinitrate Tablet (ISORDIL) 30 milliGRAM(s) Oral three times a day  polyethylene glycol 3350 17 Gram(s) Oral two times a day  senna 2 Tablet(s) Oral at bedtime    MEDICATIONS  (PRN):  hydrOXYzine hydrochloride 25 milliGRAM(s) Oral two times a day PRN Anxiety      ALLERGIES:  penicillins (Unknown)      LABS:                                                    12.5   7.63  )-----------( 163      ( 15 Dec 2023 04:03 )             38.3   12-15    135  |  104  |  30<H>  ----------------------------<  129<H>  4.3   |  19<L>  |  1.05    Ca    9.9      15 Dec 2023 04:03  Phos  3.4     12-15  Mg     1.8     12-15    TPro  7.0  /  Alb  3.9  /  TBili  0.2  /  DBili  x   /  AST  29  /  ALT  117<H>  /  AlkPhos  65  12-15      < from: CT Abdomen and Pelvis No Cont (11.28.23 @ 03:38) >  IMPRESSION:  Mildly dilated colon and prominent but not overly dilated small bowel   with air-fluid levels. No discrete transition point. Findings are   suggestive of ileus.    Intra-aortic balloon pump with the inferior marker at the level of the   inferior mesenteric artery and the balloon overlying the origins of the   renal arteries, celiac artery, and superior mesenteric artery. Consider   repositioning. Concern for IABP positioning was discussed with LANETTE Hawthorne   on 11/28/2023 at 3:42 AM by Dr. Shepard.    < end of copied text >          < from: Colonoscopy (11.17.23 @ 10:35) >                                                                                                        Impression:          - The entire examined colon is normal on direct and retroflexion views.                       - No specimens collected.  Recommendation:      - Resume previous diet today.                       - No large polyps or masses detected, No objection from GI standpoint to                 proceed with heart transplantation/advanced heart therapies.                       - Please call back should any further questions or concerns arise.    < end of copied text >     < from: Xray Chest 1 View- PORTABLE-Urgent (11.01.23 @ 07:42) >    IMPRESSION:  Clear lungs.    ---End of Report ---        < end of copied text >  < from: TTE W or WO Ultrasound Enhancing Agent (11.01.23 @ 10:23) >  _____________________________     CONCLUSIONS:      1. Left ventricular cavity is moderately dilated. Left ventricular wall thickness is normal. Left ventricular systolic function is severely decreased with an ejection fraction of 32 % by Chinchilla's method of disks. Regional wall motion abnormalities present.   2. Multiple segmental abnormalities exist. See findings.   3. There is moderate (grade 2) left ventricular diastolic dysfunction, with indeterminant filling pressure.   4. Normal right ventricular cavity size, wall thickness, and systolic function.   5. No significant valvular disease.   6. No pericardial effusion seen.   7. Compared to the transthoracic echocardiogram performed on 1/25/2017 the areas of akinesis are unchanged but there has been a decline in LV systolic function with new areas of hypokinesis.    __________________________________________________________________    < end of copied text >  < from: TTE Limited W or WO Ultrasound Enhancing Agent (11.02.23 @ 07:41) >  __________________________     CONCLUSIONS:      1. After obtaining consent, Definity ultrasound enhancing agent was given for enhanced left ventricular opacification and improved delineation of the left ventricular endocardial borders. Left ventricular systolic function is severely decreased with a calculated ejection fraction of 22 % by the Chinchilla's biplane method of disks. There is a left ventricular thrombus.   2. Findings were discussed with Litzy BOSS on 11/2/2023 at 8.49am.   3. There is a left ventricular thrombus.    _________________________________________________________________    < end of copied text >

## 2023-12-16 NOTE — PROGRESS NOTE ADULT - SUBJECTIVE AND OBJECTIVE BOX
Subjective    Patient was in relatively good spirits with no acute complaints, no fevers, chest pain or SOB. Remains on IABP.        Current Meds:  aspirin  chewable 81 milliGRAM(s) Oral daily  atorvastatin 80 milliGRAM(s) Oral at bedtime  budesonide  80 MICROgram(s)/formoterol 4.5 MICROgram(s) Inhaler 2 Puff(s) Inhalation two times a day  carvedilol 25 milliGRAM(s) Oral every 12 hours  chlorhexidine 2% Cloths 1 Application(s) Topical daily  heparin  Infusion 1300 Unit(s)/Hr IV Continuous <Continuous>  hydrALAZINE 50 milliGRAM(s) Oral every 8 hours  hydrOXYzine hydrochloride 25 milliGRAM(s) Oral two times a day PRN  insulin glargine Injectable (LANTUS) 12 Unit(s) SubCutaneous at bedtime  insulin lispro (ADMELOG) corrective regimen sliding scale   SubCutaneous at bedtime  insulin lispro (ADMELOG) corrective regimen sliding scale   SubCutaneous three times a day before meals  insulin lispro Injectable (ADMELOG) 9 Unit(s) SubCutaneous three times a day before meals  isosorbide   dinitrate Tablet (ISORDIL) 30 milliGRAM(s) Oral three times a day  polyethylene glycol 3350 17 Gram(s) Oral two times a day  senna 2 Tablet(s) Oral at bedtime      Vitals:  T(F): 98.8 (12-16), Max: 98.8 (12-16)  HR: 82 (12-16) (75 - 88)  BP: 94/48 (12-16) (94/48 - 94/48)  RR: 21 (12-16)  SpO2: 96% (12-16)  I&O's Summary    15 Dec 2023 07:01  -  16 Dec 2023 07:00  --------------------------------------------------------  IN: 1176 mL / OUT: 1980 mL / NET: -804 mL    16 Dec 2023 07:01  -  16 Dec 2023 11:28  --------------------------------------------------------  IN: 416 mL / OUT: 0 mL / NET: 416 mL    Physical Exam:  General: No acute distress; well appearing  Cardiovascular: RRR, S1, S2, no murmurs, rubs, or gallops; no edema; no JVD  Respiratory: Clear to auscultation bilaterally, speaking full sentences  Gastrointestinal: soft, non-tender, non-distended with normal bowel sounds  Extremities: 2+ pulses, no clubbing; no joint deformity, or edema  Neurologic: AAOx3,  Non-focal  Skin: No rashes, ecchymoses, or cyanosis                          12.5   7.63  )-----------( 163      ( 15 Dec 2023 04:03 )             38.3     12-15    135  |  104  |  30<H>  ----------------------------<  129<H>  4.3   |  19<L>  |  1.05    Ca    9.9      15 Dec 2023 04:03  Phos  3.4     12-15  Mg     1.8     12-15    TPro  7.0  /  Alb  3.9  /  TBili  0.2  /  DBili  x   /  AST  29  /  ALT  117<H>  /  AlkPhos  65  12-15    PT/INR - ( 15 Dec 2023 04:03 )   PT: 11.4 sec;   INR: 1.09 ratio      PTT - ( 15 Dec 2023 04:03 )  PTT:61.9 sec

## 2023-12-16 NOTE — PROGRESS NOTE ADULT - ASSESSMENT
59 yo M with HFrEF (LVIDd 6.4 cm, LVEF 32%), CAD s/p PCI (2008), HTN, DMT2 (A1c 8.3%) and CVA s/p TPA (2018), recently treated for PNA who initially presented to [facility] via EMS after syncope reportedly requiring defibrilation. Treated for ACS but left AMA to come to [facility]. On arrival ECG with ST depression in lateral leads. Intubated 11/1 for respiratory failure. Found to have COVID. L/RHC 11/1revealing  of LAD and RCA, elevated filling pressures and CI of 1.5 prompting placement of IABP. Extubated 11/3. IABP was weaned to off 11/7, then the following day on 11/8, developed PMVT cardiac arrest with prompt CPR and defibrillation, started on IV Lidocaine and Amio. IABP ultimately replaced on 11/9 for worsening hypotension. TTE 11/11 revealing LV thrombus.     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 AT evaluation launched 11/10, he is ABO A. He was discussed during TSC on 11/16 and was approved for listing, however was found to be Bacteremic with 11/17 Blc Cx growing mixed cultures Staph epi and Enterococcus faecalis and started on IV Vanco. Repeat cultures from 11/18 reveal NGTD and therefore was cleared by ID for listing.     He appears adequately supported on IABP at 1:1, electrically quiescent on oral Amiodarone. Cr is improving with holding diuretics. Labs otherwise notable for worsening thrombocytopenia. Awaiting suitable donor. GM + rods (Cutibacterium) in blood culture on 11/30 (reported on 12/4), restarted on vancomycin, and status 7, repeat cultures negative x48 hours (12/6),  now status 2 again.         Cardiac Studies  11/21/23 TTE: limited unable to rule out endocarditis, technically difficult study  11/1123 TTE: LVEF 22% (global), LV thrombus, normal RV size and function, mild MR, trace TR  11/1/23  LHC showed pLAD 70 % stenosis in the ostium portion of the segment and 100 % in-stent restenosis and in mRCA, 100 % stenosis.   11/1/23 RHC; RA 23 Vw 24, PA 52/33/40, PCWP 30 Vw 33, AO 85/66/73, PA sat 54.4%, CO/CI (F) 2.96/1.44, IABP was placed during the cath through R common femoral artery.   11/1/23 TTE: LVIDd 6.4cm , LVEF 32%, regional WMA, grade II DD,  TAPSE 1.7cm, mld MR, trace TR,      57 yo M with HFrEF (LVIDd 6.4 cm, LVEF 32%), CAD s/p PCI (2008), HTN, DMT2 (A1c 8.3%) and CVA s/p TPA (2018), recently treated for PNA who initially presented to [facility] via EMS after syncope reportedly requiring defibrilation. Treated for ACS but left AMA to come to [facility]. On arrival ECG with ST depression in lateral leads. Intubated 11/1 for respiratory failure. Found to have COVID. L/RHC 11/1revealing  of LAD and RCA, elevated filling pressures and CI of 1.5 prompting placement of IABP. Extubated 11/3. IABP was weaned to off 11/7, then the following day on 11/8, developed PMVT cardiac arrest with prompt CPR and defibrillation, started on IV Lidocaine and Amio. IABP ultimately replaced on 11/9 for worsening hypotension. TTE 11/11 revealing LV thrombus.     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 AT evaluation launched 11/10, he is ABO A. He was discussed during TSC on 11/16 and was approved for listing, however was found to be Bacteremic with 11/17 Blc Cx growing mixed cultures Staph epi and Enterococcus faecalis and started on IV Vanco. Repeat cultures from 11/18 reveal NGTD and therefore was cleared by ID for listing.     He appears adequately supported on IABP at 1:1, electrically quiescent on oral Amiodarone. Cr is improving with holding diuretics. Labs otherwise notable for worsening thrombocytopenia. Awaiting suitable donor. GM + rods (Cutibacterium) in blood culture on 11/30 (reported on 12/4), restarted on vancomycin, and status 7, repeat cultures negative x48 hours (12/6),  now status 2 again.         Cardiac Studies  11/21/23 TTE: limited unable to rule out endocarditis, technically difficult study  11/1123 TTE: LVEF 22% (global), LV thrombus, normal RV size and function, mild MR, trace TR  11/1/23  LHC showed pLAD 70 % stenosis in the ostium portion of the segment and 100 % in-stent restenosis and in mRCA, 100 % stenosis.   11/1/23 RHC; RA 23 Vw 24, PA 52/33/40, PCWP 30 Vw 33, AO 85/66/73, PA sat 54.4%, CO/CI (F) 2.96/1.44, IABP was placed during the cath through R common femoral artery.   11/1/23 TTE: LVIDd 6.4cm , LVEF 32%, regional WMA, grade II DD,  TAPSE 1.7cm, mld MR, trace TR,

## 2023-12-16 NOTE — PROGRESS NOTE ADULT - NSPROGADDITIONALINFOA_GEN_ALL_CORE
57yo M with ischemic HFrEF, ADHF and cardiogenic shock awaiting heart transplant (status 2)  Neuro: no deficits, prn atarax for anxiety  Pulm: sating well on RA, prn albuterol and symbicort  CV: IABP 1:1 with optical sensor failure now RRA for pressure tracing, coreg, hydral, isordil  Renal: Cr under 1, no issues  GI: DASH diet  Heme: heparin gtt for LV thrombus and IABP.   ID: no active issues  Endo: BG controlled  Derm: Hepatitis immunization induced rash, improving on topical steroids  Lines: IABP (11/20), RRA (12/14)

## 2023-12-16 NOTE — PROGRESS NOTE ADULT - PROBLEM SELECTOR PLAN 1
-Listen UNOS status 2.   - L IABP at 1:1, placed 11/9. Exchange to LFA given high grade bacteremia on 11/20. On AC with Heparin infusion (also for LV thrombus)  - Continue Coreg 25 mg BID hold for MAP <65  - Continue HDZN 50 mg TID and ISDN 30 mg TID, hold for MAP <65  - Off romel given HyperK  - AT evaluation: Accepted for transplant, currently listed UNOS Status 2. ABO A. PRA 0% 12/12.    However, downgraded to status 7 on 12/4 given + blood culture. But now re-upgraded to status 2.   - Please keep primary Dr. Banuelos [phone] in the loop per family request. -Listen UNOS status 2.   - L IABP at 1:1, placed 11/9. Exchange to LFA given high grade bacteremia on 11/20. On AC with Heparin infusion (also for LV thrombus)  - Continue Coreg 25 mg BID hold for MAP <65  - Continue HDZN 50 mg TID and ISDN 30 mg TID, hold for MAP <65  - Off romel given HyperK  - AT evaluation: Accepted for transplant, currently listed UNOS Status 2. ABO A. PRA 0% 12/12.    However, downgraded to status 7 on 12/4 given + blood culture. But now re-upgraded to status 2.   - Please keep primary Dr. Banuelos [phone] in the loop per family request.

## 2023-12-16 NOTE — PROGRESS NOTE ADULT - ASSESSMENT
59 yo male h/o htn, cad s/p pci, ICH, here with NSTEMI  s/p intubation and cath. now in CCU    NSTEMI  s/p  cath  cath results noted. multi vessel dz., CTSx f/u.   hep gtt  pt with vtach/fib arrest again on 11/8. s/p ACLS and ROSC.   now extubated  IABP in place  mngt as per CCU  plan for transplant as per HF and transplant team  transplant w/u ongoing.   s/p colonoscopy 11/17. normal  now listed for transplant as of 11/22    LV thrombus   hep gtt    acute on chronic systolic heart failure  heart failure team f/u    pna  resolved     covid  resolved  now off isolation     h/o ICH  resolved    agitation  psy consult f/u    bacteremia  id following  iv abx  f/u repeat cult  - ngtd    vomiting and abd pain  ileus on CT  bowel regimen  gi f/u  +bm    now resolved.     IABP malposition on CT  mngt as per ccu. iabp adjusted    rash  possible drug reaction  derm f/u  improving    mngt as per CCU team          Advanced care planning was discussed with patient and family.  Advanced care planning forms were reviewed and discussed as appropriate.  Differential diagnosis and plan of care discussed with patient after the evaluation.   Pain assessed and judicious use of narcotics when appropriate was discussed.  Importance of Fall prevention discussed.  Counseling on Smoking and Alcohol cessation was offered when appropriate.  Counseling on Diet, exercise, and medication compliance was done.       Approx 75 minutes spent. 57 yo male h/o htn, cad s/p pci, ICH, here with NSTEMI  s/p intubation and cath. now in CCU    NSTEMI  s/p  cath  cath results noted. multi vessel dz., CTSx f/u.   hep gtt  pt with vtach/fib arrest again on 11/8. s/p ACLS and ROSC.   now extubated  IABP in place  mngt as per CCU  plan for transplant as per HF and transplant team  transplant w/u ongoing.   s/p colonoscopy 11/17. normal  now listed for transplant as of 11/22    LV thrombus   hep gtt    acute on chronic systolic heart failure  heart failure team f/u    pna  resolved     covid  resolved  now off isolation     h/o ICH  resolved    agitation  psy consult f/u    bacteremia  id following  iv abx  f/u repeat cult  - ngtd    vomiting and abd pain  ileus on CT  bowel regimen  gi f/u  +bm    now resolved.     IABP malposition on CT  mngt as per ccu. iabp adjusted    rash  possible drug reaction  derm f/u  improving    mngt as per CCU team          Advanced care planning was discussed with patient and family.  Advanced care planning forms were reviewed and discussed as appropriate.  Differential diagnosis and plan of care discussed with patient after the evaluation.   Pain assessed and judicious use of narcotics when appropriate was discussed.  Importance of Fall prevention discussed.  Counseling on Smoking and Alcohol cessation was offered when appropriate.  Counseling on Diet, exercise, and medication compliance was done.       Approx 75 minutes spent.

## 2023-12-16 NOTE — PROGRESS NOTE ADULT - ATTENDING COMMENTS
Patient found in bed in NAD. He denies any chest pain or SOB. He remains on IABP support waiting for OHT. His end organ function remains preserved on labs from 12/15. Good urine output.       Continue support with IABP 1:1  A/C per protocol   Continue further afterload reduction with ISDN/ carvedilol 25 mg BID and hydralazine 50 mg TID   PT follow up/ nutrition support    Labs tomorrow  OHT once a suitable donor becomes available

## 2023-12-16 NOTE — PROGRESS NOTE ADULT - SUBJECTIVE AND OBJECTIVE BOX
DEVORAH VALENCIA  MRN-[id number]  Patient is a 58y old  Male who presents with a chief complaint of Cardiogenic shock (16 Dec 2023 06:49)    HPI:  pt seen and approx 1:30 pm  in [facility]    57yo M w/ hx HTN, CAD w/ 1 stent in , ICH () presenting with abn ekg. Patient presented to [facility] where he was found to have STEMI, recommended to get cath however patient did not want to get it there so it left and came here.  Patient initially had cough, congestion, fever, was placed on antibiotics on .  Started feeling nauseous and had a presyncopal event after which he presented to ED last night.  Had chest pain as well.  Chest pain is midsternal.  Not currently having chest pain.  Received 4 aspirin 30 min pta. (2023 15:11)      Hospital Course:    24 HOUR EVENTS:    REVIEW OF SYSTEMS:    CONSTITUTIONAL: No weakness, fevers or chills  EYES/ENT: No visual changes;  No vertigo or throat pain   NECK: No pain or stiffness  RESPIRATORY: No cough, wheezing, hemoptysis; No shortness of breath  CARDIOVASCULAR: No chest pain or palpitations  GASTROINTESTINAL: No abdominal or epigastric pain. No nausea, vomiting, or hematemesis; No diarrhea or constipation. No melena or hematochezia.  GENITOURINARY: No dysuria, frequency or hematuria  NEUROLOGICAL: No numbness or weakness  SKIN: No itching, rashes      ICU Vital Signs Last 24 Hrs  T(C): 36.7 (16 Dec 2023 20:00), Max: 37.1 (16 Dec 2023 07:00)  T(F): 98 (16 Dec 2023 20:00), Max: 98.8 (16 Dec 2023 07:00)  HR: 78 (16 Dec 2023 23:00) (76 - 91)  BP: 94/48 (16 Dec 2023 07:00) (94/48 - 94/48)  BP(mean): 63 (16 Dec 2023 07:00) (63 - 63)  ABP: --  ABP(mean): --  RR: 16 (16 Dec 2023 23:00) (11 - 26)  SpO2: 97% (16 Dec 2023 17:00) (94% - 97%)    O2 Parameters below as of 16 Dec 2023 19:00  Patient On (Oxygen Delivery Method): room air            CVP(mm Hg): --  CO: --  CI: --  PA: --  PA(mean): --  PA(direct): --  PCWP: --  LA: --  RA: --  SVR: --  SVRI: --  PVR: --  PVRI: --    POCT Blood Glucose.: 156 mg/dL (23 @ 20:38)  POCT Blood Glucose.: 180 mg/dL (23 @ 16:50)  POCT Blood Glucose.: 134 mg/dL (23 @ 12:40)  POCT Blood Glucose.: 119 mg/dL (23 @ 08:18)    CAPILLARY BLOOD GLUCOSE      POCT Blood Glucose.: 156 mg/dL (16 Dec 2023 20:38)      PHYSICAL EXAM:  GENERAL: No acute distress, well-developed  HEAD:  Atraumatic, Normocephalic  EYES: EOMI, PERRLA, conjunctiva and sclera clear  NECK: Supple, no lymphadenopathy, no JVD  CHEST/LUNG: CTAB; No wheezes, rales, or rhonchi  HEART: Regular rate and rhythm. Normal S1/S2. No murmurs, rubs, or gallops  ABDOMEN: Soft, non-tender, non-distended; normal bowel sounds, no organomegaly  EXTREMITIES:  2+ peripheral pulses b/l, No clubbing, cyanosis, or edema  NEUROLOGY: A&O x 3, no focal deficits  SKIN: No rashes or lesions    ============================I/O===========================   I&O's Detail    15 Dec 2023 07:01  -  16 Dec 2023 07:00  --------------------------------------------------------  IN:    Heparin: 336 mL    Oral Fluid: 840 mL  Total IN: 1176 mL    OUT:    Voided (mL): 1980 mL  Total OUT: 1980 mL    Total NET: -804 mL      16 Dec 2023 07:01  -  16 Dec 2023 23:53  --------------------------------------------------------  IN:    Heparin: 238 mL    Oral Fluid: 820 mL  Total IN: 1058 mL    OUT:    Voided (mL): 1125 mL  Total OUT: 1125 mL    Total NET: -67 mL        ============================ LABS =========================                        12.5   7.63  )-----------( 163      ( 15 Dec 2023 04:03 )             38.3     12-15    135  |  104  |  30<H>  ----------------------------<  129<H>  4.3   |  19<L>  |  1.05    Ca    9.9      15 Dec 2023 04:03  Phos  3.4     12-15  Mg     1.8     12-15    TPro  7.0  /  Alb  3.9  /  TBili  0.2  /  DBili  x   /  AST  29  /  ALT  117<H>  /  AlkPhos  65  12-15                LIVER FUNCTIONS - ( 15 Dec 2023 04:03 )  Alb: 3.9 g/dL / Pro: 7.0 g/dL / ALK PHOS: 65 U/L / ALT: 117 U/L / AST: 29 U/L / GGT: x           PT/INR - ( 15 Dec 2023 04:03 )   PT: 11.4 sec;   INR: 1.09 ratio         PTT - ( 15 Dec 2023 04:03 )  PTT:61.9 sec      Urinalysis Basic - ( 15 Dec 2023 04:03 )    Color: x / Appearance: x / SG: x / pH: x  Gluc: 129 mg/dL / Ketone: x  / Bili: x / Urobili: x   Blood: x / Protein: x / Nitrite: x   Leuk Esterase: x / RBC: x / WBC x   Sq Epi: x / Non Sq Epi: x / Bacteria: x      ======================Micro/Rad/Cardio=================  Telemtry: Reviewed   EKG: Reviewed  CXR: Reviewed  Culture: Reviewed   Echo:   Cath: Cardiac Cath Lab - Adult:   [facility]  Department of Cardiology  [address] [place] [address]  [phone]  Cath Lab Report -- Comprehensive Report  Patient: LD VALENCIA  Study date: 2017  Account number: [id number]  MR number: [id number]  : 1965  Gender: Male  Race: W  Case Physician(s):  Jairon Holguin M.D.  Referring Physician:  Luc Lynn M.D.  INDICATIONS: Unstable angina - CCS4.  HISTORY: The patient has a history of coronary artery disease. The patient  hashypertension and medication-treated dyslipidemia.  PROCEDURE:  --  Left heart catheterization with ventriculography.  --  Left coronary angiography.  --  Right coronary angiography.  TECHNIQUE: The risks and alternatives of the procedures and conscious  sedation were explained to the patient and informed consent was obtained.  Cardiac catheterization performed electively.  Local anesthetic given. Right radial artery access. A 6FR PRELUDE KIT was  inserted in the vessel. Left heart catheterization. Ventriculography was  performed. A 5FR FR4.0 EXPO catheter was utilized. Left coronary artery  angiography. The vessel was injected utilizing a 5FR FL3.5 EXPO catheter.  Right coronary artery angiography. The vessel was injected utilizing a 5FR  FR4.0 EXPO catheter. RADIATION EXPOSURE: 1.1 min.  CONTRAST GIVEN: Omnipaque 55 ml.  MEDICATIONS GIVEN: Midazolam, 1 mg, IV. Fentanyl, 25 mcg, IV. Verapamil  (Isoptin, Calan, Covera), 2.5 mg, IA. Heparin, 3000 units, IA.  VENTRICLES: Global left ventricular function was moderately depressed. EF  estimated was 40 %.  CORONARY VESSELS: The coronary circulation is right dominant.  LM:   --  LM: Normal.  LAD:   --  Proximal LAD: There was a 50 % stenosis.  CX:   --  Circumflex: Normal.  RCA:   --  Mid RCA: There was a 40 % stenosis.  --  Distal RCA: There was a 50 % stenosis.  COMPLICATIONS: There were no complications.  DIAGNOSTIC RECOMMENDATIONS: The patient should continue with the present  medications.  Prepared and signed by  Jairon Holguin M.D.  Signed 2017 12:20:13  HEMODYNAMIC TABLES  Pressures:  Baseline/ Room Air  Pressures:  - HR: 78  Pressures:  - Rhythm:  Pressures:  -- Aortic Pressure (S/D/M): --/--/99  Pressures:  -- Left Ventricle (s/edp): 157/39/--  Outputs:  Baseline/ Room Air  Outputs:  -- CALCULATIONS: Age in years: 51.41  Outputs:  -- CALCULATIONS: Body Surface Area: 2.05  Outputs:  -- CALCULATIONS: Height in cm: 175.00  Outputs:  -- CALCULATIONS: Sex: Male  Outputs:  -- CALCULATIONS: Weight in k.40 (17 @ 21:55)    ======================================================  PAST MEDICAL & SURGICAL HISTORY:  HTN (hypertension)      CAD (coronary artery disease)  ; stent      Intracranial hemorrhage        Respiratory arrest        Myocardial infarction, unspecified MI type, unspecified artery      History of coronary artery stent placement        ====================ASSESSMENT ==============            Plan:  ====================CARDIOVASCULAR==================    Mechaincal Circulatory Support [ ] IABP,  [ ] Impella 2.5,  [ ] Impella CP  Settings:     ==Hemodynamics==    CVP:   PCWP:  PA S/D:   Cardiac Output:  Cardiac Index:   SVR:      ==Coronaries==    Last ischemic workup:   Antiplatelet regimen:   Anticoagulant:   Statin:   Beta blocker:      ==Pump==    LVEF:                              Regional Wall Motion Abnormaility?:  [ ]Yes   [ ] No, If Yes, Details  Diastolic function:  RV function:   Any change frim prior?: [ ] Yes   [ ] No, If Yes, Details:       ==Rhythm==    Current rhythm:  AM EKG Interpretation:   Anti-arrhythmic therapies:   TVP with settings:     carvedilol 25 milliGRAM(s) Oral every 12 hours  hydrALAZINE 50 milliGRAM(s) Oral every 8 hours  isosorbide   dinitrate Tablet (ISORDIL) 30 milliGRAM(s) Oral three times a day      ====================== NEUROLOGY=====================  hydrOXYzine hydrochloride 25 milliGRAM(s) Oral two times a day PRN Anxiety    ==================== RESPIRATORY======================  Mechanical Ventilation:        budesonide  80 MICROgram(s)/formoterol 4.5 MICROgram(s) Inhaler 2 Puff(s) Inhalation two times a day    ===================== RENAL =========================    12-15-23 @ 07:23 @ 07:00  --------------------------------------------------------  IN: 1176 mL / OUT: 1980 mL / NET: -804 mL    23 @ 07:23 @ 23:53  --------------------------------------------------------  IN: 1058 mL / OUT: 1125 mL / NET: -67 mL      Renal Replacement Therapy:  [ ] CRRT      [ ] IHD, Last Session:    Fluid removal:     [ ] Diuretic therapy, Regimen:       ==================== GASTROINTESTINAL===================    Last BM:   Indication for Stress Ulcer Prophylaxis, [ ] Yes    [ ] No   If Yes, Medication:     polyethylene glycol 3350 17 Gram(s) Oral two times a day  senna 2 Tablet(s) Oral at bedtime    ========================INFECTIOUS DISEASE================  T(C): 36.7 (23 @ 20:00), Max: 37.1 (23 @ 07:00)  WBC Count: 7.63 K/uL (12-15-23 @ 04:03)        Current Antibiotics with start date:       ===================HEMATOLOGIC/ONC ===================  Hemoglobin: 12.5 g/dL (12-15-23 @ 04:03)    Platelet Count - Automated: 163 K/uL (12-15-23 @ 04:03)    Chemical VTE Prophylaxis:  [ ] Lovenox    [ ] SQH   [ ]NA  Systemic Anticogaulation:  [ ] Yes    [ ] No,  If Yes, Medication:     aspirin  chewable 81 milliGRAM(s) Oral daily  heparin  Infusion 1300 Unit(s)/Hr (14 mL/Hr) IV Continuous <Continuous>    =======================    ENDOCRINE  =====================  POCT Blood Glucose.: 156 mg/dL (23 @ 20:38)  POCT Blood Glucose.: 180 mg/dL (23 @ 16:50)  POCT Blood Glucose.: 134 mg/dL (23 @ 12:40)  POCT Blood Glucose.: 119 mg/dL (23 @ 08:18)        atorvastatin 80 milliGRAM(s) Oral at bedtime  insulin glargine Injectable (LANTUS) 12 Unit(s) SubCutaneous at bedtime  insulin lispro (ADMELOG) corrective regimen sliding scale   SubCutaneous three times a day before meals  insulin lispro (ADMELOG) corrective regimen sliding scale   SubCutaneous at bedtime  insulin lispro Injectable (ADMELOG) 9 Unit(s) SubCutaneous three times a day before meals      Patient requires continuous monitoring with bedside rhythm monitoring, pulse ox monitoring, and intermittent blood gas analysis. Care plan discussed with ICU care team. Patient remained critical and at risk for life threatening decompensation.  Patient seen, examined and plan discussed with CCU team during rounds.       I have personally provided ____ minutes of critical care time excluding time spent on separate procedures, in addition to initial critical care time provided by the CICU Attending, Dr. Lees/ Ladarius/ Marisa/ Saba/ Juan/ Consuelo.       DEVORAH VALENCIA  MRN-[id number]  Patient is a 58y old  Male who presents with a chief complaint of Cardiogenic shock (16 Dec 2023 06:49)    HPI:  pt seen and approx 1:30 pm  in [facility]    57yo M w/ hx HTN, CAD w/ 1 stent in , ICH () presenting with abn ekg. Patient presented to [facility] where he was found to have STEMI, recommended to get cath however patient did not want to get it there so it left and came here.  Patient initially had cough, congestion, fever, was placed on antibiotics on .  Started feeling nauseous and had a presyncopal event after which he presented to ED last night.  Had chest pain as well.  Chest pain is midsternal.  Not currently having chest pain.  Received 4 aspirin 30 min pta. (2023 15:11)      Hospital Course:    24 HOUR EVENTS:    REVIEW OF SYSTEMS:    CONSTITUTIONAL: No weakness, fevers or chills  EYES/ENT: No visual changes;  No vertigo or throat pain   NECK: No pain or stiffness  RESPIRATORY: No cough, wheezing, hemoptysis; No shortness of breath  CARDIOVASCULAR: No chest pain or palpitations  GASTROINTESTINAL: No abdominal or epigastric pain. No nausea, vomiting, or hematemesis; No diarrhea or constipation. No melena or hematochezia.  GENITOURINARY: No dysuria, frequency or hematuria  NEUROLOGICAL: No numbness or weakness  SKIN: No itching, rashes      ICU Vital Signs Last 24 Hrs  T(C): 36.7 (16 Dec 2023 20:00), Max: 37.1 (16 Dec 2023 07:00)  T(F): 98 (16 Dec 2023 20:00), Max: 98.8 (16 Dec 2023 07:00)  HR: 78 (16 Dec 2023 23:00) (76 - 91)  BP: 94/48 (16 Dec 2023 07:00) (94/48 - 94/48)  BP(mean): 63 (16 Dec 2023 07:00) (63 - 63)  ABP: --  ABP(mean): --  RR: 16 (16 Dec 2023 23:00) (11 - 26)  SpO2: 97% (16 Dec 2023 17:00) (94% - 97%)    O2 Parameters below as of 16 Dec 2023 19:00  Patient On (Oxygen Delivery Method): room air            CVP(mm Hg): --  CO: --  CI: --  PA: --  PA(mean): --  PA(direct): --  PCWP: --  LA: --  RA: --  SVR: --  SVRI: --  PVR: --  PVRI: --    POCT Blood Glucose.: 156 mg/dL (23 @ 20:38)  POCT Blood Glucose.: 180 mg/dL (23 @ 16:50)  POCT Blood Glucose.: 134 mg/dL (23 @ 12:40)  POCT Blood Glucose.: 119 mg/dL (23 @ 08:18)    CAPILLARY BLOOD GLUCOSE      POCT Blood Glucose.: 156 mg/dL (16 Dec 2023 20:38)      PHYSICAL EXAM:  GENERAL: No acute distress, well-developed  HEAD:  Atraumatic, Normocephalic  EYES: EOMI, PERRLA, conjunctiva and sclera clear  NECK: Supple, no lymphadenopathy, no JVD  CHEST/LUNG: CTAB; No wheezes, rales, or rhonchi  HEART: Regular rate and rhythm. Normal S1/S2. No murmurs, rubs, or gallops  ABDOMEN: Soft, non-tender, non-distended; normal bowel sounds, no organomegaly  EXTREMITIES:  2+ peripheral pulses b/l, No clubbing, cyanosis, or edema  NEUROLOGY: A&O x 3, no focal deficits  SKIN: No rashes or lesions    ============================I/O===========================   I&O's Detail    15 Dec 2023 07:01  -  16 Dec 2023 07:00  --------------------------------------------------------  IN:    Heparin: 336 mL    Oral Fluid: 840 mL  Total IN: 1176 mL    OUT:    Voided (mL): 1980 mL  Total OUT: 1980 mL    Total NET: -804 mL      16 Dec 2023 07:01  -  16 Dec 2023 23:53  --------------------------------------------------------  IN:    Heparin: 238 mL    Oral Fluid: 820 mL  Total IN: 1058 mL    OUT:    Voided (mL): 1125 mL  Total OUT: 1125 mL    Total NET: -67 mL        ============================ LABS =========================                        12.5   7.63  )-----------( 163      ( 15 Dec 2023 04:03 )             38.3     12-15    135  |  104  |  30<H>  ----------------------------<  129<H>  4.3   |  19<L>  |  1.05    Ca    9.9      15 Dec 2023 04:03  Phos  3.4     12-15  Mg     1.8     12-15    TPro  7.0  /  Alb  3.9  /  TBili  0.2  /  DBili  x   /  AST  29  /  ALT  117<H>  /  AlkPhos  65  12-15                LIVER FUNCTIONS - ( 15 Dec 2023 04:03 )  Alb: 3.9 g/dL / Pro: 7.0 g/dL / ALK PHOS: 65 U/L / ALT: 117 U/L / AST: 29 U/L / GGT: x           PT/INR - ( 15 Dec 2023 04:03 )   PT: 11.4 sec;   INR: 1.09 ratio         PTT - ( 15 Dec 2023 04:03 )  PTT:61.9 sec      Urinalysis Basic - ( 15 Dec 2023 04:03 )    Color: x / Appearance: x / SG: x / pH: x  Gluc: 129 mg/dL / Ketone: x  / Bili: x / Urobili: x   Blood: x / Protein: x / Nitrite: x   Leuk Esterase: x / RBC: x / WBC x   Sq Epi: x / Non Sq Epi: x / Bacteria: x      ======================Micro/Rad/Cardio=================  Telemtry: Reviewed   EKG: Reviewed  CXR: Reviewed  Culture: Reviewed   Echo:   Cath: Cardiac Cath Lab - Adult:   [facility]  Department of Cardiology  [address] [place] [address]  [phone]  Cath Lab Report -- Comprehensive Report  Patient: LD VALENCIA  Study date: 2017  Account number: [id number]  MR number: [id number]  : 1965  Gender: Male  Race: W  Case Physician(s):  Jairon Holguin M.D.  Referring Physician:  Luc Lynn M.D.  INDICATIONS: Unstable angina - CCS4.  HISTORY: The patient has a history of coronary artery disease. The patient  hashypertension and medication-treated dyslipidemia.  PROCEDURE:  --  Left heart catheterization with ventriculography.  --  Left coronary angiography.  --  Right coronary angiography.  TECHNIQUE: The risks and alternatives of the procedures and conscious  sedation were explained to the patient and informed consent was obtained.  Cardiac catheterization performed electively.  Local anesthetic given. Right radial artery access. A 6FR PRELUDE KIT was  inserted in the vessel. Left heart catheterization. Ventriculography was  performed. A 5FR FR4.0 EXPO catheter was utilized. Left coronary artery  angiography. The vessel was injected utilizing a 5FR FL3.5 EXPO catheter.  Right coronary artery angiography. The vessel was injected utilizing a 5FR  FR4.0 EXPO catheter. RADIATION EXPOSURE: 1.1 min.  CONTRAST GIVEN: Omnipaque 55 ml.  MEDICATIONS GIVEN: Midazolam, 1 mg, IV. Fentanyl, 25 mcg, IV. Verapamil  (Isoptin, Calan, Covera), 2.5 mg, IA. Heparin, 3000 units, IA.  VENTRICLES: Global left ventricular function was moderately depressed. EF  estimated was 40 %.  CORONARY VESSELS: The coronary circulation is right dominant.  LM:   --  LM: Normal.  LAD:   --  Proximal LAD: There was a 50 % stenosis.  CX:   --  Circumflex: Normal.  RCA:   --  Mid RCA: There was a 40 % stenosis.  --  Distal RCA: There was a 50 % stenosis.  COMPLICATIONS: There were no complications.  DIAGNOSTIC RECOMMENDATIONS: The patient should continue with the present  medications.  Prepared and signed by  Jairon Holguin M.D.  Signed 2017 12:20:13  HEMODYNAMIC TABLES  Pressures:  Baseline/ Room Air  Pressures:  - HR: 78  Pressures:  - Rhythm:  Pressures:  -- Aortic Pressure (S/D/M): --/--/99  Pressures:  -- Left Ventricle (s/edp): 157/39/--  Outputs:  Baseline/ Room Air  Outputs:  -- CALCULATIONS[age]  Outputs:  -- CALCULATIONS: Body Surface Area: 2.05  Outputs:  -- CALCULATIONS: Height in cm: 175.00  Outputs:  -- CALCULATIONS: Sex: Male  Outputs:  -- CALCULATIONS: Weight in k.40 (17 @ 21:55)    ======================================================  PAST MEDICAL & SURGICAL HISTORY:  HTN (hypertension)      CAD (coronary artery disease)  ; stent      Intracranial hemorrhage        Respiratory arrest        Myocardial infarction, unspecified MI type, unspecified artery      History of coronary artery stent placement        ====================ASSESSMENT ==============            Plan:  ====================CARDIOVASCULAR==================    Mechaincal Circulatory Support [ ] IABP,  [ ] Impella 2.5,  [ ] Impella CP  Settings:     ==Hemodynamics==    CVP:   PCWP:  PA S/D:   Cardiac Output:  Cardiac Index:   SVR:      ==Coronaries==    Last ischemic workup:   Antiplatelet regimen:   Anticoagulant:   Statin:   Beta blocker:      ==Pump==    LVEF:                              Regional Wall Motion Abnormaility?:  [ ]Yes   [ ] No, If Yes, Details  Diastolic function:  RV function:   Any change frim prior?: [ ] Yes   [ ] No, If Yes, Details:       ==Rhythm==    Current rhythm:  AM EKG Interpretation:   Anti-arrhythmic therapies:   TVP with settings:     carvedilol 25 milliGRAM(s) Oral every 12 hours  hydrALAZINE 50 milliGRAM(s) Oral every 8 hours  isosorbide   dinitrate Tablet (ISORDIL) 30 milliGRAM(s) Oral three times a day      ====================== NEUROLOGY=====================  hydrOXYzine hydrochloride 25 milliGRAM(s) Oral two times a day PRN Anxiety    ==================== RESPIRATORY======================  Mechanical Ventilation:        budesonide  80 MICROgram(s)/formoterol 4.5 MICROgram(s) Inhaler 2 Puff(s) Inhalation two times a day    ===================== RENAL =========================    12-15-23 @ 07:23 @ 07:00  --------------------------------------------------------  IN: 1176 mL / OUT: 1980 mL / NET: -804 mL    23 @ 07:23 @ 23:53  --------------------------------------------------------  IN: 1058 mL / OUT: 1125 mL / NET: -67 mL      Renal Replacement Therapy:  [ ] CRRT      [ ] IHD, Last Session:    Fluid removal:     [ ] Diuretic therapy, Regimen:       ==================== GASTROINTESTINAL===================    Last BM:   Indication for Stress Ulcer Prophylaxis, [ ] Yes    [ ] No   If Yes, Medication:     polyethylene glycol 3350 17 Gram(s) Oral two times a day  senna 2 Tablet(s) Oral at bedtime    ========================INFECTIOUS DISEASE================  T(C): 36.7 (23 @ 20:00), Max: 37.1 (23 @ 07:00)  WBC Count: 7.63 K/uL (12-15-23 @ 04:03)        Current Antibiotics with start date:       ===================HEMATOLOGIC/ONC ===================  Hemoglobin: 12.5 g/dL (12-15-23 @ 04:03)    Platelet Count - Automated: 163 K/uL (12-15-23 @ 04:03)    Chemical VTE Prophylaxis:  [ ] Lovenox    [ ] SQH   [ ]NA  Systemic Anticogaulation:  [ ] Yes    [ ] No,  If Yes, Medication:     aspirin  chewable 81 milliGRAM(s) Oral daily  heparin  Infusion 1300 Unit(s)/Hr (14 mL/Hr) IV Continuous <Continuous>    =======================    ENDOCRINE  =====================  POCT Blood Glucose.: 156 mg/dL (23 @ 20:38)  POCT Blood Glucose.: 180 mg/dL (23 @ 16:50)  POCT Blood Glucose.: 134 mg/dL (23 @ 12:40)  POCT Blood Glucose.: 119 mg/dL (23 @ 08:18)        atorvastatin 80 milliGRAM(s) Oral at bedtime  insulin glargine Injectable (LANTUS) 12 Unit(s) SubCutaneous at bedtime  insulin lispro (ADMELOG) corrective regimen sliding scale   SubCutaneous three times a day before meals  insulin lispro (ADMELOG) corrective regimen sliding scale   SubCutaneous at bedtime  insulin lispro Injectable (ADMELOG) 9 Unit(s) SubCutaneous three times a day before meals      Patient requires continuous monitoring with bedside rhythm monitoring, pulse ox monitoring, and intermittent blood gas analysis. Care plan discussed with ICU care team. Patient remained critical and at risk for life threatening decompensation.  Patient seen, examined and plan discussed with CCU team during rounds.       I have personally provided ____ minutes of critical care time excluding time spent on separate procedures, in addition to initial critical care time provided by the CICU Attending, Dr. Lees/ Ladarius/ Marisa/ Saba/ Juan/ Consuelo.       DEVORAH VALENCIA  MRN-[id number]  Patient is a 58y old  Male who presents with a chief complaint of Cardiogenic shock (16 Dec 2023 06:49)    HPI:  pt seen and approx 1:30 pm  in [facility]    59yo M w/ hx HTN, CAD w/ 1 stent in , ICH () presenting with abn ekg. Patient presented to [facility] where he was found to have STEMI, recommended to get cath however patient did not want to get it there so it left and came here.  Patient initially had cough, congestion, fever, was placed on antibiotics on .  Started feeling nauseous and had a presyncopal event after which he presented to ED last night.  Had chest pain as well.  Chest pain is midsternal.  Not currently having chest pain.  Received 4 aspirin 30 min pta. (2023 15:11)      Hospital Course:    24 HOUR EVENTS:    REVIEW OF SYSTEMS:    CONSTITUTIONAL: No weakness, fevers or chills  EYES/ENT: No visual changes;  No vertigo or throat pain   NECK: No pain or stiffness  RESPIRATORY: No cough, wheezing, hemoptysis; No shortness of breath  CARDIOVASCULAR: No chest pain or palpitations  GASTROINTESTINAL: No abdominal or epigastric pain. No nausea, vomiting, or hematemesis; No diarrhea or constipation. No melena or hematochezia.  GENITOURINARY: No dysuria, frequency or hematuria  NEUROLOGICAL: No numbness or weakness  SKIN: No itching, rashes      ICU Vital Signs Last 24 Hrs  T(C): 36.7 (16 Dec 2023 20:00), Max: 37.1 (16 Dec 2023 07:00)  T(F): 98 (16 Dec 2023 20:00), Max: 98.8 (16 Dec 2023 07:00)  HR: 78 (16 Dec 2023 23:00) (76 - 91)  BP: 94/48 (16 Dec 2023 07:00) (94/48 - 94/48)  BP(mean): 63 (16 Dec 2023 07:00) (63 - 63)  ABP: --  ABP(mean): --  RR: 16 (16 Dec 2023 23:00) (11 - 26)  SpO2: 97% (16 Dec 2023 17:00) (94% - 97%)    O2 Parameters below as of 16 Dec 2023 19:00  Patient On (Oxygen Delivery Method): room air        POCT Blood Glucose.: 156 mg/dL (23 @ 20:38)  POCT Blood Glucose.: 180 mg/dL (23 @ 16:50)  POCT Blood Glucose.: 134 mg/dL (23 @ 12:40)  POCT Blood Glucose.: 119 mg/dL (23 @ 08:18)    CAPILLARY BLOOD GLUCOSE      POCT Blood Glucose.: 156 mg/dL (16 Dec 2023 20:38)      PHYSICAL EXAM:  GENERAL: No acute distress, well-developed  HEAD:  Atraumatic, Normocephalic  EYES: EOMI, PERRLA, conjunctiva and sclera clear  NECK: Supple, no lymphadenopathy, no JVD  CHEST/LUNG: CTAB; No wheezes, rales, or rhonchi  HEART: Regular rate and rhythm. Normal S1/S2. No murmurs, rubs, or gallops  ABDOMEN: Soft, non-tender, non-distended; normal bowel sounds, no organomegaly  EXTREMITIES:  2+ peripheral pulses b/l, No clubbing, cyanosis, or edema  NEUROLOGY: A&O x 3, no focal deficits  SKIN: No rashes or lesions    ============================I/O===========================   I&O's Detail    15 Dec 2023 07:  -  16 Dec 2023 07:00  --------------------------------------------------------  IN:    Heparin: 336 mL    Oral Fluid: 840 mL  Total IN: 1176 mL    OUT:    Voided (mL): 1980 mL  Total OUT: 1980 mL    Total NET: -804 mL      16 Dec 2023 07:01  -  16 Dec 2023 23:53  --------------------------------------------------------  IN:    Heparin: 238 mL    Oral Fluid: 820 mL  Total IN: 1058 mL    OUT:    Voided (mL): 1125 mL  Total OUT: 1125 mL    Total NET: -67 mL        ============================ LABS =========================                        12.5   7.63  )-----------( 163      ( 15 Dec 2023 04:03 )             38.3     12-15    135  |  104  |  30<H>  ----------------------------<  129<H>  4.3   |  19<L>  |  1.05    Ca    9.9      15 Dec 2023 04:03  Phos  3.4     12-15  Mg     1.8     12-15    TPro  7.0  /  Alb  3.9  /  TBili  0.2  /  DBili  x   /  AST  29  /  ALT  117<H>  /  AlkPhos  65  12-15                LIVER FUNCTIONS - ( 15 Dec 2023 04:03 )  Alb: 3.9 g/dL / Pro: 7.0 g/dL / ALK PHOS: 65 U/L / ALT: 117 U/L / AST: 29 U/L / GGT: x           PT/INR - ( 15 Dec 2023 04:03 )   PT: 11.4 sec;   INR: 1.09 ratio         PTT - ( 15 Dec 2023 04:03 )  PTT:61.9 sec      Urinalysis Basic - ( 15 Dec 2023 04:03 )    Color: x / Appearance: x / SG: x / pH: x  Gluc: 129 mg/dL / Ketone: x  / Bili: x / Urobili: x   Blood: x / Protein: x / Nitrite: x   Leuk Esterase: x / RBC: x / WBC x   Sq Epi: x / Non Sq Epi: x / Bacteria: x      ======================Micro/Rad/Cardio=================  Telemtry: Reviewed   EKG: Reviewed  CXR: Reviewed  Culture: Reviewed   Echo:   Cath: Cardiac Cath Lab - Adult:   [facility]  Department of Cardiology  [address] [place] [address]  [phone]  Cath Lab Report -- Comprehensive Report  Patient: LD VALENCIA  Study date: 2017  Account number: [id number]  MR number: [id number]  : 1965  Gender: Male  Race: W  Case Physician(s):  Jairon Holguin M.D.  Referring Physician:  Luc Lynn M.D.  INDICATIONS: Unstable angina - CCS4.  HISTORY: The patient has a history of coronary artery disease. The patient  hashypertension and medication-treated dyslipidemia.  PROCEDURE:  --  Left heart catheterization with ventriculography.  --  Left coronary angiography.  --  Right coronary angiography.  TECHNIQUE: The risks and alternatives of the procedures and conscious  sedation were explained to the patient and informed consent was obtained.  Cardiac catheterization performed electively.  Local anesthetic given. Right radial artery access. A 6FR PRELUDE KIT was  inserted in the vessel. Left heart catheterization. Ventriculography was  performed. A 5FR FR4.0 EXPO catheter was utilized. Left coronary artery  angiography. The vessel was injected utilizing a 5FR FL3.5 EXPO catheter.  Right coronary artery angiography. The vessel was injected utilizing a 5FR  FR4.0 EXPO catheter. RADIATION EXPOSURE: 1.1 min.  CONTRAST GIVEN: Omnipaque 55 ml.  MEDICATIONS GIVEN: Midazolam, 1 mg, IV. Fentanyl, 25 mcg, IV. Verapamil  (Isoptin, Calan, Covera), 2.5 mg, IA. Heparin, 3000 units, IA.  VENTRICLES: Global left ventricular function was moderately depressed. EF  estimated was 40 %.  CORONARY VESSELS: The coronary circulation is right dominant.  LM:   --  LM: Normal.  LAD:   --  Proximal LAD: There was a 50 % stenosis.  CX:   --  Circumflex: Normal.  RCA:   --  Mid RCA: There was a 40 % stenosis.  --  Distal RCA: There was a 50 % stenosis.  COMPLICATIONS: There were no complications.  DIAGNOSTIC RECOMMENDATIONS: The patient should continue with the present  medications.  Prepared and signed by  Jairon Holguin M.D.  Signed 2017 12:20:13  HEMODYNAMIC TABLES  Pressures:  Baseline/ Room Air  Pressures:  - HR: 78  Pressures:  - Rhythm:  Pressures:  -- Aortic Pressure (S/D/M): --/--/99  Pressures:  -- Left Ventricle (s/edp): 157/39/--  Outputs:  Baseline/ Room Air  Outputs:  -- CALCULATIONS[age]  Outputs:  -- CALCULATIONS: Body Surface Area: 2.05  Outputs:  -- CALCULATIONS: Height in cm: 175.00  Outputs:  -- CALCULATIONS: Sex: Male  Outputs:  -- CALCULATIONS: Weight in k.40 (17 @ 21:55)    ======================================================  PAST MEDICAL & SURGICAL HISTORY:  HTN (hypertension)      CAD (coronary artery disease)  ; stent      Intracranial hemorrhage        Respiratory arrest        Myocardial infarction, unspecified MI type, unspecified artery      History of coronary artery stent placement        ====================ASSESSMENT ==============            Plan:  ====================CARDIOVASCULAR==================  Vfib arrest i/s/o ischemia  - Lido gtt off   - PO Amio load - total of 5g per EP complete   - Amio dc'd  for rising LFTs  - Keep K > 4, Mag > 2.2     Cardiogenic shock requiring IABP (- , -)  - Likely 2/2 NSTEMI and ADHF  -  LHC: pLAD 100 % in-stent restenosis & mRCA, 100 %. PCWP 30. IABP placed.  -  TTE: LV dilated. EF 32 %. Regional WMAs present, mod (grade 2) LV diastolic dysfunction  -  TTE: EF 22% and + LV thrombus  - IABP swapped  to RFA, continue 1:1 support  - Off Milrinone gtt @ 7:30 am   - [place] d/c'd due to elevated K levels  -  - reduced hydralazine 75 TID to 50 TID and ISD 40 TID to 30 TID for AL reduction  - c/w coreg 25 BID for GDMT  - UNOS status 2 as of     NSTEMI iso stent re-occlusion of pLAD and 100%  of RCA  - EKG on admission w/ LBBB  - DAPT: c/w ASA, Brilinta d/c'd per transplant w/u  - c/w lipitor 80  - cMR deferred given necessity of IABP  - CT sx not recommending CABG, undergoing AT eval    LV thrombus  - c/w heparin gtt w/ therapeutic PTT    carvedilol 25 milliGRAM(s) Oral every 12 hours  hydrALAZINE 50 milliGRAM(s) Oral every 8 hours  isosorbide   dinitrate Tablet (ISORDIL) 30 milliGRAM(s) Oral three times a day      ====================== NEUROLOGY=====================  Anxiety  - No Seroquel/antipsychotics since vfib arrest and prolonged QTC  - Psych Eval, recommended SSRI, but pt. refused   - Psych recommending atarax PRN  - Continue to monitor mental status    PT/Conditioning  - Continue band exercises while on bedrest s/t IABP    hydrOXYzine hydrochloride 25 milliGRAM(s) Oral two times a day PRN Anxiety    ==================== RESPIRATORY======================  Acute Hypoxemic Respiratory Failure  - s/p x2 intubations for cardiogenic pulm edema and the in setting of cardiac arrest, resolved - extubated 11/10  - Currently maintaining >95% sats on room air  - Continue incentive spirometry and monitoring of sp02    Asthma  - c/w albuterol, symbicort and spiriva  - On trelegy at home  - Continue to monitor SpO2 with goal >94%      budesonide  80 MICROgram(s)/formoterol 4.5 MICROgram(s) Inhaler 2 Puff(s) Inhalation two times a day    ===================== RENAL =========================  Non-oliguric ARIC, resolved   - Baseline Cr: 1-1.22  - Renal US: no evidence of renal artery stenosis  - Trend BMP, lytes daily, replace as needed  - Continue Strict I/Os, avoid nephrotoxins    Mild Hyponatremia/Hyperkalemia iso ARIC  - resolved  - Urea powder d/c'd per renal  - Trend daily  - Continue monitoring urine output, lytes, SCr/ BUN  - Replete lytes prn with goal K >4 and Mg >2    12-15-23 @ 07:01  -  23 @ 07:00  --------------------------------------------------------  IN: 1176 mL / OUT: 1980 mL / NET: -804 mL    23 @ 07:01  -  23 @ 23:53  --------------------------------------------------------  IN: 1058 mL / OUT: 1125 mL / NET: -67 mL      Renal Replacement Therapy:  [ ] CRRT      [ ] IHD, Last Session:    Fluid removal:     [ ] Diuretic therapy, Regimen:       ==================== GASTROINTESTINAL===================  Constipation/ileus, resolved  - regular diet  - bowel reg prn    Last BM:   Indication for Stress Ulcer Prophylaxis, [ ] Yes    [ ] No   If Yes, Medication:     polyethylene glycol 3350 17 Gram(s) Oral two times a day  senna 2 Tablet(s) Oral at bedtime    ========================INFECTIOUS DISEASE================  Positive blood culture, resolved  - Repeat BCx drawn  for leukocytosis to 12k - positive on , G+ rods in anaerobic bottle, suspect contaminant  - Repeat cultures x2 sent  per transplant ID recs - no growth to date  - Vancomycin by trough (-)  - Final microbial ID: Cutibacterium acnes, per ID this is likely to be a skin contaminant, vanc dc'd.   - Dental eval  to rule out infectious etiology that would have led to bacteremia, no significant findings on exam.     Enterococcus faecalis bacteremia, resolved  - BCx + for enterococcus faecalis x2, Staph epi x1 (likely contaminant)- pan sensitive   - Urine cx  + enterococcus faecalis  - BCx  no growth   - IABP site swapped to RFA   - s/p Vancomycin 1g q12h (-)  - CT A/P negative for infectious pathology    COVID, resolved  - Off airborne precautions     Pre-transplant ID w/u   - Trend ID recs for serologies   - Colonoscopy  - normal   - Chest CT  - improved LLL aeration  - s/p immunizations      T(C): 36.7 (23 @ 20:00), Max: 37.1 (23 @ 07:00)  WBC Count: 7.63 K/uL (12-15-23 @ 04:03)        Current Antibiotics with start date:       ===================HEMATOLOGIC/ONC ===================  H/H & plts stable  - Monitor H/H and plts  - VTE PPX: heparin gtt    Hemoglobin: 12.5 g/dL (12-15-23 @ 04:03)    Platelet Count - Automated: 163 K/uL (12-15-23 @ 04:03)    Chemical VTE Prophylaxis:  [ ] Lovenox    [ ] SQH   [ ]NA  Systemic Anticogaulation:  [ ] Yes    [ ] No,  If Yes, Medication:     aspirin  chewable 81 milliGRAM(s) Oral daily  heparin  Infusion 1300 Unit(s)/Hr (14 mL/Hr) IV Continuous <Continuous>    =======================    ENDOCRINE  =====================  Type 2 DM  - A1c 8.3, glucose controlled  - Continue lantus, premeal, low ISS  - continue FS    POCT Blood Glucose.: 156 mg/dL (23 @ 20:38)  POCT Blood Glucose.: 180 mg/dL (23 @ 16:50)  POCT Blood Glucose.: 134 mg/dL (23 @ 12:40)  POCT Blood Glucose.: 119 mg/dL (23 @ 08:18)        atorvastatin 80 milliGRAM(s) Oral at bedtime  insulin glargine Injectable (LANTUS) 12 Unit(s) SubCutaneous at bedtime  insulin lispro (ADMELOG) corrective regimen sliding scale   SubCutaneous three times a day before meals  insulin lispro (ADMELOG) corrective regimen sliding scale   SubCutaneous at bedtime  insulin lispro Injectable (ADMELOG) 9 Unit(s) SubCutaneous three times a day before meals      Patient requires continuous monitoring with bedside rhythm monitoring, pulse ox monitoring, and intermittent blood gas analysis. Care plan discussed with ICU care team. Patient remained critical and at risk for life threatening decompensation.  Patient seen, examined and plan discussed with CCU team during rounds.       I have personally provided __30__ minutes of critical care time excluding time spent on separate procedures, in addition to initial critical care time provided by the CICU Attending, Dr. Her       DEVORAH VALENCIA  MRN-[id number]  Patient is a 58y old  Male who presents with a chief complaint of Cardiogenic shock (16 Dec 2023 06:49)    HPI:  pt seen and approx 1:30 pm  in [facility]    57yo M w/ hx HTN, CAD w/ 1 stent in , ICH () presenting with abn ekg. Patient presented to [facility] where he was found to have STEMI, recommended to get cath however patient did not want to get it there so it left and came here.  Patient initially had cough, congestion, fever, was placed on antibiotics on .  Started feeling nauseous and had a presyncopal event after which he presented to ED last night.  Had chest pain as well.  Chest pain is midsternal.  Not currently having chest pain.  Received 4 aspirin 30 min pta. (2023 15:11)      Hospital Course:    24 HOUR EVENTS:    REVIEW OF SYSTEMS:    CONSTITUTIONAL: No weakness, fevers or chills  EYES/ENT: No visual changes;  No vertigo or throat pain   NECK: No pain or stiffness  RESPIRATORY: No cough, wheezing, hemoptysis; No shortness of breath  CARDIOVASCULAR: No chest pain or palpitations  GASTROINTESTINAL: No abdominal or epigastric pain. No nausea, vomiting, or hematemesis; No diarrhea or constipation. No melena or hematochezia.  GENITOURINARY: No dysuria, frequency or hematuria  NEUROLOGICAL: No numbness or weakness  SKIN: No itching, rashes      ICU Vital Signs Last 24 Hrs  T(C): 36.7 (16 Dec 2023 20:00), Max: 37.1 (16 Dec 2023 07:00)  T(F): 98 (16 Dec 2023 20:00), Max: 98.8 (16 Dec 2023 07:00)  HR: 78 (16 Dec 2023 23:00) (76 - 91)  BP: 94/48 (16 Dec 2023 07:00) (94/48 - 94/48)  BP(mean): 63 (16 Dec 2023 07:00) (63 - 63)  ABP: --  ABP(mean): --  RR: 16 (16 Dec 2023 23:00) (11 - 26)  SpO2: 97% (16 Dec 2023 17:00) (94% - 97%)    O2 Parameters below as of 16 Dec 2023 19:00  Patient On (Oxygen Delivery Method): room air        POCT Blood Glucose.: 156 mg/dL (23 @ 20:38)  POCT Blood Glucose.: 180 mg/dL (23 @ 16:50)  POCT Blood Glucose.: 134 mg/dL (23 @ 12:40)  POCT Blood Glucose.: 119 mg/dL (23 @ 08:18)    CAPILLARY BLOOD GLUCOSE      POCT Blood Glucose.: 156 mg/dL (16 Dec 2023 20:38)      PHYSICAL EXAM:  GENERAL: No acute distress, well-developed  HEAD:  Atraumatic, Normocephalic  EYES: EOMI, PERRLA, conjunctiva and sclera clear  NECK: Supple, no lymphadenopathy, no JVD  CHEST/LUNG: CTAB; No wheezes, rales, or rhonchi  HEART: Regular rate and rhythm. Normal S1/S2. No murmurs, rubs, or gallops  ABDOMEN: Soft, non-tender, non-distended; normal bowel sounds, no organomegaly  EXTREMITIES:  2+ peripheral pulses b/l, No clubbing, cyanosis, or edema  NEUROLOGY: A&O x 3, no focal deficits  SKIN: No rashes or lesions    ============================I/O===========================   I&O's Detail    15 Dec 2023 07:  -  16 Dec 2023 07:00  --------------------------------------------------------  IN:    Heparin: 336 mL    Oral Fluid: 840 mL  Total IN: 1176 mL    OUT:    Voided (mL): 1980 mL  Total OUT: 1980 mL    Total NET: -804 mL      16 Dec 2023 07:01  -  16 Dec 2023 23:53  --------------------------------------------------------  IN:    Heparin: 238 mL    Oral Fluid: 820 mL  Total IN: 1058 mL    OUT:    Voided (mL): 1125 mL  Total OUT: 1125 mL    Total NET: -67 mL        ============================ LABS =========================                        12.5   7.63  )-----------( 163      ( 15 Dec 2023 04:03 )             38.3     12-15    135  |  104  |  30<H>  ----------------------------<  129<H>  4.3   |  19<L>  |  1.05    Ca    9.9      15 Dec 2023 04:03  Phos  3.4     12-15  Mg     1.8     12-15    TPro  7.0  /  Alb  3.9  /  TBili  0.2  /  DBili  x   /  AST  29  /  ALT  117<H>  /  AlkPhos  65  12-15                LIVER FUNCTIONS - ( 15 Dec 2023 04:03 )  Alb: 3.9 g/dL / Pro: 7.0 g/dL / ALK PHOS: 65 U/L / ALT: 117 U/L / AST: 29 U/L / GGT: x           PT/INR - ( 15 Dec 2023 04:03 )   PT: 11.4 sec;   INR: 1.09 ratio         PTT - ( 15 Dec 2023 04:03 )  PTT:61.9 sec      Urinalysis Basic - ( 15 Dec 2023 04:03 )    Color: x / Appearance: x / SG: x / pH: x  Gluc: 129 mg/dL / Ketone: x  / Bili: x / Urobili: x   Blood: x / Protein: x / Nitrite: x   Leuk Esterase: x / RBC: x / WBC x   Sq Epi: x / Non Sq Epi: x / Bacteria: x      ======================Micro/Rad/Cardio=================  Telemtry: Reviewed   EKG: Reviewed  CXR: Reviewed  Culture: Reviewed   Echo:   Cath: Cardiac Cath Lab - Adult:   [facility]  Department of Cardiology  [address] [place] [address]  [phone]  Cath Lab Report -- Comprehensive Report  Patient: LD VALENCIA  Study date: 2017  Account number: [id number]  MR number: [id number]  : 1965  Gender: Male  Race: W  Case Physician(s):  Jairon Holguin M.D.  Referring Physician:  Luc Lynn M.D.  INDICATIONS: Unstable angina - CCS4.  HISTORY: The patient has a history of coronary artery disease. The patient  hashypertension and medication-treated dyslipidemia.  PROCEDURE:  --  Left heart catheterization with ventriculography.  --  Left coronary angiography.  --  Right coronary angiography.  TECHNIQUE: The risks and alternatives of the procedures and conscious  sedation were explained to the patient and informed consent was obtained.  Cardiac catheterization performed electively.  Local anesthetic given. Right radial artery access. A 6FR PRELUDE KIT was  inserted in the vessel. Left heart catheterization. Ventriculography was  performed. A 5FR FR4.0 EXPO catheter was utilized. Left coronary artery  angiography. The vessel was injected utilizing a 5FR FL3.5 EXPO catheter.  Right coronary artery angiography. The vessel was injected utilizing a 5FR  FR4.0 EXPO catheter. RADIATION EXPOSURE: 1.1 min.  CONTRAST GIVEN: Omnipaque 55 ml.  MEDICATIONS GIVEN: Midazolam, 1 mg, IV. Fentanyl, 25 mcg, IV. Verapamil  (Isoptin, Calan, Covera), 2.5 mg, IA. Heparin, 3000 units, IA.  VENTRICLES: Global left ventricular function was moderately depressed. EF  estimated was 40 %.  CORONARY VESSELS: The coronary circulation is right dominant.  LM:   --  LM: Normal.  LAD:   --  Proximal LAD: There was a 50 % stenosis.  CX:   --  Circumflex: Normal.  RCA:   --  Mid RCA: There was a 40 % stenosis.  --  Distal RCA: There was a 50 % stenosis.  COMPLICATIONS: There were no complications.  DIAGNOSTIC RECOMMENDATIONS: The patient should continue with the present  medications.  Prepared and signed by  Jairon Holguin M.D.  Signed 2017 12:20:13  HEMODYNAMIC TABLES  Pressures:  Baseline/ Room Air  Pressures:  - HR: 78  Pressures:  - Rhythm:  Pressures:  -- Aortic Pressure (S/D/M): --/--/99  Pressures:  -- Left Ventricle (s/edp): 157/39/--  Outputs:  Baseline/ Room Air  Outputs:  -- CALCULATIONS[age]  Outputs:  -- CALCULATIONS: Body Surface Area: 2.05  Outputs:  -- CALCULATIONS: Height in cm: 175.00  Outputs:  -- CALCULATIONS: Sex: Male  Outputs:  -- CALCULATIONS: Weight in k.40 (17 @ 21:55)    ======================================================  PAST MEDICAL & SURGICAL HISTORY:  HTN (hypertension)      CAD (coronary artery disease)  ; stent      Intracranial hemorrhage        Respiratory arrest        Myocardial infarction, unspecified MI type, unspecified artery      History of coronary artery stent placement        ====================ASSESSMENT ==============            Plan:  ====================CARDIOVASCULAR==================  Vfib arrest i/s/o ischemia  - Lido gtt off   - PO Amio load - total of 5g per EP complete   - Amio dc'd  for rising LFTs  - Keep K > 4, Mag > 2.2     Cardiogenic shock requiring IABP (- , -)  - Likely 2/2 NSTEMI and ADHF  -  LHC: pLAD 100 % in-stent restenosis & mRCA, 100 %. PCWP 30. IABP placed.  -  TTE: LV dilated. EF 32 %. Regional WMAs present, mod (grade 2) LV diastolic dysfunction  -  TTE: EF 22% and + LV thrombus  - IABP swapped  to RFA, continue 1:1 support  - Off Milrinone gtt @ 7:30 am   - [place] d/c'd due to elevated K levels  -  - reduced hydralazine 75 TID to 50 TID and ISD 40 TID to 30 TID for AL reduction  - c/w coreg 25 BID for GDMT  - UNOS status 2 as of     NSTEMI iso stent re-occlusion of pLAD and 100%  of RCA  - EKG on admission w/ LBBB  - DAPT: c/w ASA, Brilinta d/c'd per transplant w/u  - c/w lipitor 80  - cMR deferred given necessity of IABP  - CT sx not recommending CABG, undergoing AT eval    LV thrombus  - c/w heparin gtt w/ therapeutic PTT    carvedilol 25 milliGRAM(s) Oral every 12 hours  hydrALAZINE 50 milliGRAM(s) Oral every 8 hours  isosorbide   dinitrate Tablet (ISORDIL) 30 milliGRAM(s) Oral three times a day      ====================== NEUROLOGY=====================  Anxiety  - No Seroquel/antipsychotics since vfib arrest and prolonged QTC  - Psych Eval, recommended SSRI, but pt. refused   - Psych recommending atarax PRN  - Continue to monitor mental status    PT/Conditioning  - Continue band exercises while on bedrest s/t IABP    hydrOXYzine hydrochloride 25 milliGRAM(s) Oral two times a day PRN Anxiety    ==================== RESPIRATORY======================  Acute Hypoxemic Respiratory Failure  - s/p x2 intubations for cardiogenic pulm edema and the in setting of cardiac arrest, resolved - extubated 11/10  - Currently maintaining >95% sats on room air  - Continue incentive spirometry and monitoring of sp02    Asthma  - c/w albuterol, symbicort and spiriva  - On trelegy at home  - Continue to monitor SpO2 with goal >94%      budesonide  80 MICROgram(s)/formoterol 4.5 MICROgram(s) Inhaler 2 Puff(s) Inhalation two times a day    ===================== RENAL =========================  Non-oliguric ARIC, resolved   - Baseline Cr: 1-1.22  - Renal US: no evidence of renal artery stenosis  - Trend BMP, lytes daily, replace as needed  - Continue Strict I/Os, avoid nephrotoxins    Mild Hyponatremia/Hyperkalemia iso ARIC  - resolved  - Urea powder d/c'd per renal  - Trend daily  - Continue monitoring urine output, lytes, SCr/ BUN  - Replete lytes prn with goal K >4 and Mg >2    12-15-23 @ 07:01  -  23 @ 07:00  --------------------------------------------------------  IN: 1176 mL / OUT: 1980 mL / NET: -804 mL    23 @ 07:01  -  23 @ 23:53  --------------------------------------------------------  IN: 1058 mL / OUT: 1125 mL / NET: -67 mL      Renal Replacement Therapy:  [ ] CRRT      [ ] IHD, Last Session:    Fluid removal:     [ ] Diuretic therapy, Regimen:       ==================== GASTROINTESTINAL===================  Constipation/ileus, resolved  - regular diet  - bowel reg prn    Last BM:   Indication for Stress Ulcer Prophylaxis, [ ] Yes    [ ] No   If Yes, Medication:     polyethylene glycol 3350 17 Gram(s) Oral two times a day  senna 2 Tablet(s) Oral at bedtime    ========================INFECTIOUS DISEASE================  Positive blood culture, resolved  - Repeat BCx drawn  for leukocytosis to 12k - positive on , G+ rods in anaerobic bottle, suspect contaminant  - Repeat cultures x2 sent  per transplant ID recs - no growth to date  - Vancomycin by trough (-)  - Final microbial ID: Cutibacterium acnes, per ID this is likely to be a skin contaminant, vanc dc'd.   - Dental eval  to rule out infectious etiology that would have led to bacteremia, no significant findings on exam.     Enterococcus faecalis bacteremia, resolved  - BCx + for enterococcus faecalis x2, Staph epi x1 (likely contaminant)- pan sensitive   - Urine cx  + enterococcus faecalis  - BCx  no growth   - IABP site swapped to RFA   - s/p Vancomycin 1g q12h (-)  - CT A/P negative for infectious pathology    COVID, resolved  - Off airborne precautions     Pre-transplant ID w/u   - Trend ID recs for serologies   - Colonoscopy  - normal   - Chest CT  - improved LLL aeration  - s/p immunizations      T(C): 36.7 (23 @ 20:00), Max: 37.1 (23 @ 07:00)  WBC Count: 7.63 K/uL (12-15-23 @ 04:03)        Current Antibiotics with start date:       ===================HEMATOLOGIC/ONC ===================  H/H & plts stable  - Monitor H/H and plts  - VTE PPX: heparin gtt    Hemoglobin: 12.5 g/dL (12-15-23 @ 04:03)    Platelet Count - Automated: 163 K/uL (12-15-23 @ 04:03)    Chemical VTE Prophylaxis:  [ ] Lovenox    [ ] SQH   [ ]NA  Systemic Anticogaulation:  [ ] Yes    [ ] No,  If Yes, Medication:     aspirin  chewable 81 milliGRAM(s) Oral daily  heparin  Infusion 1300 Unit(s)/Hr (14 mL/Hr) IV Continuous <Continuous>    =======================    ENDOCRINE  =====================  Type 2 DM  - A1c 8.3, glucose controlled  - Continue lantus, premeal, low ISS  - continue FS    POCT Blood Glucose.: 156 mg/dL (23 @ 20:38)  POCT Blood Glucose.: 180 mg/dL (23 @ 16:50)  POCT Blood Glucose.: 134 mg/dL (23 @ 12:40)  POCT Blood Glucose.: 119 mg/dL (23 @ 08:18)        atorvastatin 80 milliGRAM(s) Oral at bedtime  insulin glargine Injectable (LANTUS) 12 Unit(s) SubCutaneous at bedtime  insulin lispro (ADMELOG) corrective regimen sliding scale   SubCutaneous three times a day before meals  insulin lispro (ADMELOG) corrective regimen sliding scale   SubCutaneous at bedtime  insulin lispro Injectable (ADMELOG) 9 Unit(s) SubCutaneous three times a day before meals      Patient requires continuous monitoring with bedside rhythm monitoring, pulse ox monitoring, and intermittent blood gas analysis. Care plan discussed with ICU care team. Patient remained critical and at risk for life threatening decompensation.  Patient seen, examined and plan discussed with CCU team during rounds.       I have personally provided __30__ minutes of critical care time excluding time spent on separate procedures, in addition to initial critical care time provided by the CICU Attending, Dr. Her

## 2023-12-16 NOTE — PROGRESS NOTE ADULT - SUBJECTIVE AND OBJECTIVE BOX
CICU DAY NOTE  Admission date: 11/1/23  Chief complaint/ Diagnosis  HPI: 59yo M w/ hx HTN, CAD w/ 1 stent in 2009, ICH (2008) presenting with abn ekg. Patient presented to [facility] where he was found to have STEMI, recommended to get cath however patient did not want to get it there so it left and came here.  Patient initially had cough, congestion, fever, was placed on antibiotics on Sunday.  Started feeling nauseous and had a presyncopal event after which he presented to ED last night.  Had chest pain as well.  Chest pain is midsternal.  Not currently having chest pain.  Received 4 aspirin 30 min pta. (01 Nov 2023 15:11)    Interval history: Uneventful overnight    REVIEW OF SYSTEMS  Denies CP, Palpitation, SOB, Dyspnea [X ] All other systems negative    MEDICATIONS  (STANDING)  aspirin  chewable 81 milliGRAM(s) Oral daily  atorvastatin 80 milliGRAM(s) Oral at bedtime  budesonide  80 MICROgram(s)/formoterol 4.5 MICROgram(s) Inhaler 2 Puff(s) Inhalation two times a day  carvedilol 25 milliGRAM(s) Oral every 12 hours  chlorhexidine 2% Cloths 1 Application(s) Topical daily  heparin  Infusion 1300 Unit(s)/Hr (14 mL/Hr) IV Continuous <Continuous>  hydrALAZINE 50 milliGRAM(s) Oral every 8 hours  insulin glargine Injectable (LANTUS) 12 Unit(s) SubCutaneous at bedtime  insulin lispro (ADMELOG) corrective regimen sliding scale   SubCutaneous three times a day before meals  insulin lispro (ADMELOG) corrective regimen sliding scale   SubCutaneous at bedtime  insulin lispro Injectable (ADMELOG) 9 Unit(s) SubCutaneous three times a day before meals  isosorbide   dinitrate Tablet (ISORDIL) 30 milliGRAM(s) Oral three times a day  polyethylene glycol 3350 17 Gram(s) Oral two times a day  senna 2 Tablet(s) Oral at bedtime    MEDICATIONS  (PRN):  hydrOXYzine hydrochloride 25 milliGRAM(s) Oral two times a day PRN Anxiety    PAST MEDICAL & SURGICAL HISTORY:  HTN (hypertension)  CAD (coronary artery disease) 2009; stent  Intracranial hemorrhage 2008  Respiratory arrest december 1st  Myocardial infarction, unspecified MI type, unspecified artery  History of coronary artery stent placement    FAMILY HISTORY:  Family history of meningitis (Sibling)  Brother, death at age 49 from complications of infection (June 2015)  Family history of hypertension in mother Mother    Allergy   penicillins (Unknown)    ICU Vital Signs Last 24 Hrs  T(C): 36.6 (Max: 36.7)  HR: 79 (70 - 88)  BP: 108/61 (108/61 - 108/61)  Aug  RR: 17  (12 - 25)  SpO2: 96%  (95% - 97%) on room air     I&O's Summary  IN: 1162 mL / OUT: 1980 mL / NET: -818 mL    PHYSICAL EXAM  Appearance: Normal, NAD  HEAD:  Normocephalic  EYES: PERRLA, conjunctiva and sclera clear  NECK: Supple, No JVD  CHEST/LUNG: Clear to auscultation bilaterally; No wheezing  HEART: Normal S1, S2. No murmurs, rubs, or gallops  ABDOMEN: + Bowel sounds, Soft, NT, ND   EXTREMITIES:  2+ Peripheral Pulses, No clubbing, cyanosis, or edema  NEUROLOGY: non-focal, aaox3  SKIN: No rashes or lesions    Interpretation of Telemetry:  CXR: IABP in place                           12.5   7.63  )-----------( 163                   38.3     135  |  104  |  30<H>  ----------------------------<  129<H>  4.3   |  19<L>  |  1.05    Ca    9.9      Phos  3.4     Mg     1.8 (repleted)   f/s 100-120  PTT 61.9     CICU DAY NOTE  Admission date: 11/1/23  Chief complaint/ Diagnosis  HPI: 59yo M w/ hx HTN, CAD w/ 1 stent in 2009, ICH (2008) presenting with abn ekg. Patient presented to [facility] where he was found to have STEMI, recommended to get cath however patient did not want to get it there so it left and came here.  Patient initially had cough, congestion, fever, was placed on antibiotics on Sunday.  Started feeling nauseous and had a presyncopal event after which he presented to ED last night.  Had chest pain as well.  Chest pain is midsternal.  Not currently having chest pain.  Received 4 aspirin 30 min pta. (01 Nov 2023 15:11)    Interval history: Uneventful overnight    REVIEW OF SYSTEMS  Denies CP, Palpitation, SOB, Dyspnea [X ] All other systems negative    MEDICATIONS  (STANDING)  aspirin  chewable 81 milliGRAM(s) Oral daily  atorvastatin 80 milliGRAM(s) Oral at bedtime  budesonide  80 MICROgram(s)/formoterol 4.5 MICROgram(s) Inhaler 2 Puff(s) Inhalation two times a day  carvedilol 25 milliGRAM(s) Oral every 12 hours  chlorhexidine 2% Cloths 1 Application(s) Topical daily  heparin  Infusion 1300 Unit(s)/Hr (14 mL/Hr) IV Continuous <Continuous>  hydrALAZINE 50 milliGRAM(s) Oral every 8 hours  insulin glargine Injectable (LANTUS) 12 Unit(s) SubCutaneous at bedtime  insulin lispro (ADMELOG) corrective regimen sliding scale   SubCutaneous three times a day before meals  insulin lispro (ADMELOG) corrective regimen sliding scale   SubCutaneous at bedtime  insulin lispro Injectable (ADMELOG) 9 Unit(s) SubCutaneous three times a day before meals  isosorbide   dinitrate Tablet (ISORDIL) 30 milliGRAM(s) Oral three times a day  polyethylene glycol 3350 17 Gram(s) Oral two times a day  senna 2 Tablet(s) Oral at bedtime    MEDICATIONS  (PRN):  hydrOXYzine hydrochloride 25 milliGRAM(s) Oral two times a day PRN Anxiety    PAST MEDICAL & SURGICAL HISTORY:  HTN (hypertension)  CAD (coronary artery disease) 2009; stent  Intracranial hemorrhage 2008  Respiratory arrest december 1st  Myocardial infarction, unspecified MI type, unspecified artery  History of coronary artery stent placement    FAMILY HISTORY:  Family history of meningitis (Sibling)  Brother, death at age 49 from complications of infection (June 2015)  Family history of hypertension in mother Mother    Allergy   penicillins (Unknown)    ICU Vital Signs Last 24 Hrs  T(C): 36.6 (Max: 36.7)  HR: 79 (70 - 88)  BP: 108/61 (108/61 - 108/61)  Aug  RR: 17  (12 - 25)  SpO2: 96%  (95% - 97%) on room air     I&O's Summary  IN: 1162 mL / OUT: 1980 mL / NET: -818 mL    PHYSICAL EXAM  Appearance: Normal, NAD  HEAD:  Normocephalic  EYES: PERRLA, conjunctiva and sclera clear  NECK: Supple, No JVD  CHEST/LUNG: Clear to auscultation bilaterally; No wheezing  HEART: Normal S1, S2. No murmurs, rubs, or gallops  ABDOMEN: + Bowel sounds, Soft, NT, ND   EXTREMITIES:  2+ Peripheral Pulses, No clubbing, cyanosis, or edema  NEUROLOGY: non-focal, aaox3  SKIN: No rashes or lesions    Interpretation of Telemetry:  CXR: IABP in place                           12.5   7.63  )-----------( 163                   38.3     135  |  104  |  30<H>  ----------------------------<  129<H>  4.3   |  19<L>  |  1.05    Ca    9.9      Phos  3.4     Mg     1.8 (repleted)   f/s 100-120  PTT 61.9

## 2023-12-16 NOTE — PROGRESS NOTE ADULT - ASSESSMENT
====================ASSESSMENT ==============  58 male with HTN, CAD (s/p PCI 2008), HFrEF, CVA 2018, and T2DM presenting with chest pressure and unknown tachycardia that was shocked x1, [facility] 11/1 found to have in-stent restenosis of pLAD and  of RCA with elevated RA and PA pressures and severely decreased. Admitted to CICU for management of cardiogenic shock and ADHF requiring IABP 11/1 -11/7, with hospital course c/b vfib arrest requiring reinsertion of IABP. Not recommended for ATs per HF. Currently listed for transplant status 2.    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 AT evaluation launched 11/10, he is ABO A. He was discussed during TSC on 11/16 and was approved for listing, however was found to be Bacteremic with 11/17 Blc Cx growing mixed cultures Staph epi and Enterococcus faecalis and started on IV Vanco. Repeat cultures from 11/18 reveal NGTD and therefore was cleared by ID for listing.     He appears adequately supported on IABP at 1:1, electrically quiescent on oral Amiodarone. Cr is improving with holding diuretics. Labs otherwise notable for worsening thrombocytopenia. Awaiting suitable donor. Now with GM + rods in blood culture on 11/30 (reported on 12/4), restarted on vancomycin, and status 7, repeat cultures now negative x48 hours (12/6), now status 2 again.       ====================== NEUROLOGY=====================  Anxiety  - No Seroquel/antipsychotics since vfib arrest and prolonged QTC  - Psych Eval, recommended SSRI, but pt. refused   - Psych recommending atarax PRN  - Continue to monitor mental status    PT/Conditioning  - Continue band exercises while on bedrest s/t IABP    ==================== RESPIRATORY======================  Acute Hypoxemic Respiratory Failure  - s/p x2 intubations for cardiogenic pulm edema and the in setting of cardiac arrest, resolved - extubated 11/10  - Currently maintaining >95% sats on room air  - Continue incentive spirometry and monitoring of sp02    Asthma  - c/w albuterol, symbicort and spiriva  - On trelegy at home  - Continue to monitor SpO2 with goal >94%    ====================CARDIOVASCULAR==================  Vfib arrest i/s/o ischemia  - Lido gtt off 11/13  - PO Amio load - total of 5g per EP complete 11/17  - Amio dc'd 12/5 for rising LFTs  - Keep K > 4, Mag > 2.2     Cardiogenic shock requiring IABP (11/1- 11/7, 11/9-)  - Likely 2/2 NSTEMI and ADHF  - 11/1 LHC: pLAD 100 % in-stent restenosis & mRCA, 100 %. PCWP 30. IABP placed.  - 11/1 TTE: LV dilated. EF 32 %. Regional WMAs present, mod (grade 2) LV diastolic dysfunction  - 11/2 TTE: EF 22% and + LV thrombus  - IABP swapped 11/20 to RFA, continue 1:1 support - switched sensor to R radial a-line 12/13 due to poor sensing on groin line  - Off Milrinone gtt @ 7:30 am 11/11  - [place] d/c'd due to elevated K levels  - c/w coreg 25 BID for GDMT  - UNOS status 2 as of 12/6  - hydralazine 50 TID and ISD 30 TID for AL reduction    NSTEMI iso stent re-occlusion of pLAD and 100%  of RCA  - EKG on admission w/ LBBB  - DAPT: c/w ASA, Brilinta d/c'd per transplant w/u  - c/w lipitor 80  - cMR deferred given necessity of IABP  - CT sx not recommending CABG, undergoing AT eval    LV thrombus  - c/w heparin gtt w/ therapeutic PTT    ===================== RENAL =========================  Non-oliguric ARCI, resolved   - Baseline Cr: 1-1.22  - Renal US: no evidence of renal artery stenosis  - Trend BMP, lytes daily, replace as needed  - Continue Strict I/Os, avoid nephrotoxins    Mild Hyponatremia/Hyperkalemia iso ARIC  - Continue fluid restriction   - Urea powder d/c'd per renal  - Trend daily  - Continue monitoring urine output, lytes, SCr/ BUN  - Replete lytes prn with goal K >4 and Mg >2    =============== GASTROINTESTINAL===================  Constipation/ileus, resolved  - regular diet  - bowel reg prn    ===================ENDO====================  Type 2 DM  - A1c 8.3   - Continue lantus, premeal, low ISS  - continue FS    ===================HEMATOLOGIC/ONC ===================  - H/H & plts stable  - Monitor H/H and plts  - VTE PPX: heparin gtt    ==================INFECTIOUS DISEASE================  # Positive blood culture, resolved  - Repeat BCx drawn 11/30 for leukocytosis to 12k - positive on 12/4, G+ rods in anaerobic bottle, suspect contaminant  - Repeat cultures x2 sent 12/4 per transplant ID recs - no growth to date  - Vancomycin by trough (12/4-12/6)  - Final microbial ID: Cutibacterium acnes, per ID this is likely to be a skin contaminant, vanc dc'd.   - Dental eval 12/7 to rule out infectious etiology that would have led to bacteremia, no significant findings on exam.     # Enterococcus faecalis bacteremia, resolved  - BCx 11/17+ for enterococcus faecalis x2, Staph epi x1 (likely contaminant)- pan sensitive   - Urine cx 11/18 + enterococcus faecalis  - BCx 11/18 no growth   - IABP site swapped to RFA 11/20  - s/p Vancomycin 1g q12h (11/18-11/27)  - CT A/P negative for infectious pathology    # COVID, resolved  - Off airborne precautions 11/11    # Pre-transplant ID w/u   - Trend ID recs for serologies   - Colonoscopy 11/17 - normal   - Chest CT 11/17 - improved LLL aeration  - s/p immunizations    ==================DERMATOLOGY================  # Upper Extremity Rash  - Patient developed bilateral upper extremity rash after receiving Hepatitis A & B immunizations on 11/24  - Dermatology consulted 12/5 - not likely to be related to vanco  - Recommend clobetasol 0.05% BID to affected areas   - RVP repeated to rule out viral etiology, negative   ====================ASSESSMENT ==============  58 male with HTN, CAD (s/p PCI 2008), HFrEF, CVA 2018, and T2DM presenting with chest pressure and unknown tachycardia that was shocked x1, [facility] 11/1 found to have in-stent restenosis of pLAD and  of RCA with elevated RA and PA pressures and severely decreased. Admitted to CICU for management of cardiogenic shock and ADHF requiring IABP 11/1 -11/7, with hospital course c/b vfib arrest requiring reinsertion of IABP. Not recommended for ATs per HF. Currently listed for transplant status 2.    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 AT evaluation launched 11/10, he is ABO A. He was discussed during TSC on 11/16 and was approved for listing, however was found to be Bacteremic with 11/17 Blc Cx growing mixed cultures Staph epi and Enterococcus faecalis and started on IV Vanco. Repeat cultures from 11/18 reveal NGTD and therefore was cleared by ID for listing.     He appears adequately supported on IABP at 1:1, electrically quiescent on oral Amiodarone. Cr is improving with holding diuretics. Labs otherwise notable for worsening thrombocytopenia. Awaiting suitable donor. Now with GM + rods in blood culture on 11/30 (reported on 12/4), restarted on vancomycin, and status 7, repeat cultures now negative x48 hours (12/6), now status 2 again.       ====================== NEUROLOGY=====================  Anxiety  - No Seroquel/antipsychotics since vfib arrest and prolonged QTC  - Psych Eval, recommended SSRI, but pt. refused   - Psych recommending atarax PRN  - Continue to monitor mental status    PT/Conditioning  - Continue band exercises while on bedrest s/t IABP    ==================== RESPIRATORY======================  Acute Hypoxemic Respiratory Failure  - s/p x2 intubations for cardiogenic pulm edema and the in setting of cardiac arrest, resolved - extubated 11/10  - Currently maintaining >95% sats on room air  - Continue incentive spirometry and monitoring of sp02    Asthma  - c/w albuterol, symbicort and spiriva  - On trelegy at home  - Continue to monitor SpO2 with goal >94%    ====================CARDIOVASCULAR==================  Vfib arrest i/s/o ischemia  - Lido gtt off 11/13  - PO Amio load - total of 5g per EP complete 11/17  - Amio dc'd 12/5 for rising LFTs  - Keep K > 4, Mag > 2.2     Cardiogenic shock requiring IABP (11/1- 11/7, 11/9-)  - Likely 2/2 NSTEMI and ADHF  - 11/1 LHC: pLAD 100 % in-stent restenosis & mRCA, 100 %. PCWP 30. IABP placed.  - 11/1 TTE: LV dilated. EF 32 %. Regional WMAs present, mod (grade 2) LV diastolic dysfunction  - 11/2 TTE: EF 22% and + LV thrombus  - IABP swapped 11/20 to RFA, continue 1:1 support - switched sensor to R radial a-line 12/13 due to poor sensing on groin line  - Off Milrinone gtt @ 7:30 am 11/11  - [place] d/c'd due to elevated K levels  - c/w coreg 25 BID for GDMT  - UNOS status 2 as of 12/6  - hydralazine 50 TID and ISD 30 TID for AL reduction    NSTEMI iso stent re-occlusion of pLAD and 100%  of RCA  - EKG on admission w/ LBBB  - DAPT: c/w ASA, Brilinta d/c'd per transplant w/u  - c/w lipitor 80  - cMR deferred given necessity of IABP  - CT sx not recommending CABG, undergoing AT eval    LV thrombus  - c/w heparin gtt w/ therapeutic PTT    ===================== RENAL =========================  Non-oliguric ARIC, resolved   - Baseline Cr: 1-1.22  - Renal US: no evidence of renal artery stenosis  - Trend BMP, lytes daily, replace as needed  - Continue Strict I/Os, avoid nephrotoxins    Mild Hyponatremia/Hyperkalemia iso ARIC  - Continue fluid restriction   - Urea powder d/c'd per renal  - Trend daily  - Continue monitoring urine output, lytes, SCr/ BUN  - Replete lytes prn with goal K >4 and Mg >2    =============== GASTROINTESTINAL===================  Constipation/ileus, resolved  - regular diet  - bowel reg prn    ===================ENDO====================  Type 2 DM  - A1c 8.3   - Continue lantus, premeal, low ISS  - continue FS    ===================HEMATOLOGIC/ONC ===================  - H/H & plts stable  - Monitor H/H and plts  - VTE PPX: heparin gtt    ==================INFECTIOUS DISEASE================  # Positive blood culture, resolved  - Repeat BCx drawn 11/30 for leukocytosis to 12k - positive on 12/4, G+ rods in anaerobic bottle, suspect contaminant  - Repeat cultures x2 sent 12/4 per transplant ID recs - no growth to date  - Vancomycin by trough (12/4-12/6)  - Final microbial ID: Cutibacterium acnes, per ID this is likely to be a skin contaminant, vanc dc'd.   - Dental eval 12/7 to rule out infectious etiology that would have led to bacteremia, no significant findings on exam.     # Enterococcus faecalis bacteremia, resolved  - BCx 11/17+ for enterococcus faecalis x2, Staph epi x1 (likely contaminant)- pan sensitive   - Urine cx 11/18 + enterococcus faecalis  - BCx 11/18 no growth   - IABP site swapped to RFA 11/20  - s/p Vancomycin 1g q12h (11/18-11/27)  - CT A/P negative for infectious pathology    # COVID, resolved  - Off airborne precautions 11/11    # Pre-transplant ID w/u   - Trend ID recs for serologies   - Colonoscopy 11/17 - normal   - Chest CT 11/17 - improved LLL aeration  - s/p immunizations    ==================DERMATOLOGY================  # Upper Extremity Rash  - Patient developed bilateral upper extremity rash after receiving Hepatitis A & B immunizations on 11/24  - Dermatology consulted 12/5 - not likely to be related to vanco  - Recommend clobetasol 0.05% BID to affected areas   - RVP repeated to rule out viral etiology, negative

## 2023-12-17 LAB
ALBUMIN SERPL ELPH-MCNC: 3.9 G/DL — SIGNIFICANT CHANGE UP (ref 3.3–5)
ALBUMIN SERPL ELPH-MCNC: 3.9 G/DL — SIGNIFICANT CHANGE UP (ref 3.3–5)
ALP SERPL-CCNC: 61 U/L — SIGNIFICANT CHANGE UP (ref 40–120)
ALP SERPL-CCNC: 61 U/L — SIGNIFICANT CHANGE UP (ref 40–120)
ALT FLD-CCNC: 84 U/L — HIGH (ref 10–45)
ALT FLD-CCNC: 84 U/L — HIGH (ref 10–45)
ANION GAP SERPL CALC-SCNC: 12 MMOL/L — SIGNIFICANT CHANGE UP (ref 5–17)
ANION GAP SERPL CALC-SCNC: 12 MMOL/L — SIGNIFICANT CHANGE UP (ref 5–17)
APTT BLD: 71.7 SEC — HIGH (ref 24.5–35.6)
APTT BLD: 71.7 SEC — HIGH (ref 24.5–35.6)
AST SERPL-CCNC: 23 U/L — SIGNIFICANT CHANGE UP (ref 10–40)
AST SERPL-CCNC: 23 U/L — SIGNIFICANT CHANGE UP (ref 10–40)
BASOPHILS # BLD AUTO: 0.06 K/UL — SIGNIFICANT CHANGE UP (ref 0–0.2)
BASOPHILS # BLD AUTO: 0.06 K/UL — SIGNIFICANT CHANGE UP (ref 0–0.2)
BASOPHILS NFR BLD AUTO: 0.8 % — SIGNIFICANT CHANGE UP (ref 0–2)
BASOPHILS NFR BLD AUTO: 0.8 % — SIGNIFICANT CHANGE UP (ref 0–2)
BILIRUB SERPL-MCNC: 0.2 MG/DL — SIGNIFICANT CHANGE UP (ref 0.2–1.2)
BILIRUB SERPL-MCNC: 0.2 MG/DL — SIGNIFICANT CHANGE UP (ref 0.2–1.2)
BLD GP AB SCN SERPL QL: NEGATIVE — SIGNIFICANT CHANGE UP
BLD GP AB SCN SERPL QL: NEGATIVE — SIGNIFICANT CHANGE UP
BUN SERPL-MCNC: 35 MG/DL — HIGH (ref 7–23)
BUN SERPL-MCNC: 35 MG/DL — HIGH (ref 7–23)
CALCIUM SERPL-MCNC: 9.7 MG/DL — SIGNIFICANT CHANGE UP (ref 8.4–10.5)
CALCIUM SERPL-MCNC: 9.7 MG/DL — SIGNIFICANT CHANGE UP (ref 8.4–10.5)
CHLORIDE SERPL-SCNC: 103 MMOL/L — SIGNIFICANT CHANGE UP (ref 96–108)
CHLORIDE SERPL-SCNC: 103 MMOL/L — SIGNIFICANT CHANGE UP (ref 96–108)
CO2 SERPL-SCNC: 19 MMOL/L — LOW (ref 22–31)
CO2 SERPL-SCNC: 19 MMOL/L — LOW (ref 22–31)
CREAT SERPL-MCNC: 1.16 MG/DL — SIGNIFICANT CHANGE UP (ref 0.5–1.3)
CREAT SERPL-MCNC: 1.16 MG/DL — SIGNIFICANT CHANGE UP (ref 0.5–1.3)
EGFR: 73 ML/MIN/1.73M2 — SIGNIFICANT CHANGE UP
EGFR: 73 ML/MIN/1.73M2 — SIGNIFICANT CHANGE UP
EOSINOPHIL # BLD AUTO: 0.63 K/UL — HIGH (ref 0–0.5)
EOSINOPHIL # BLD AUTO: 0.63 K/UL — HIGH (ref 0–0.5)
EOSINOPHIL NFR BLD AUTO: 8.4 % — HIGH (ref 0–6)
EOSINOPHIL NFR BLD AUTO: 8.4 % — HIGH (ref 0–6)
GLUCOSE BLDC GLUCOMTR-MCNC: 115 MG/DL — HIGH (ref 70–99)
GLUCOSE BLDC GLUCOMTR-MCNC: 115 MG/DL — HIGH (ref 70–99)
GLUCOSE BLDC GLUCOMTR-MCNC: 159 MG/DL — HIGH (ref 70–99)
GLUCOSE BLDC GLUCOMTR-MCNC: 159 MG/DL — HIGH (ref 70–99)
GLUCOSE BLDC GLUCOMTR-MCNC: 208 MG/DL — HIGH (ref 70–99)
GLUCOSE BLDC GLUCOMTR-MCNC: 208 MG/DL — HIGH (ref 70–99)
GLUCOSE BLDC GLUCOMTR-MCNC: 99 MG/DL — SIGNIFICANT CHANGE UP (ref 70–99)
GLUCOSE BLDC GLUCOMTR-MCNC: 99 MG/DL — SIGNIFICANT CHANGE UP (ref 70–99)
GLUCOSE SERPL-MCNC: 138 MG/DL — HIGH (ref 70–99)
GLUCOSE SERPL-MCNC: 138 MG/DL — HIGH (ref 70–99)
HCT VFR BLD CALC: 35.5 % — LOW (ref 39–50)
HCT VFR BLD CALC: 35.5 % — LOW (ref 39–50)
HGB BLD-MCNC: 11.7 G/DL — LOW (ref 13–17)
HGB BLD-MCNC: 11.7 G/DL — LOW (ref 13–17)
IMM GRANULOCYTES NFR BLD AUTO: 0.4 % — SIGNIFICANT CHANGE UP (ref 0–0.9)
IMM GRANULOCYTES NFR BLD AUTO: 0.4 % — SIGNIFICANT CHANGE UP (ref 0–0.9)
INR BLD: 1.1 RATIO — SIGNIFICANT CHANGE UP (ref 0.85–1.18)
INR BLD: 1.1 RATIO — SIGNIFICANT CHANGE UP (ref 0.85–1.18)
LACTATE SERPL-SCNC: 0.9 MMOL/L — SIGNIFICANT CHANGE UP (ref 0.5–2)
LACTATE SERPL-SCNC: 0.9 MMOL/L — SIGNIFICANT CHANGE UP (ref 0.5–2)
LYMPHOCYTES # BLD AUTO: 1.53 K/UL — SIGNIFICANT CHANGE UP (ref 1–3.3)
LYMPHOCYTES # BLD AUTO: 1.53 K/UL — SIGNIFICANT CHANGE UP (ref 1–3.3)
LYMPHOCYTES # BLD AUTO: 20.3 % — SIGNIFICANT CHANGE UP (ref 13–44)
LYMPHOCYTES # BLD AUTO: 20.3 % — SIGNIFICANT CHANGE UP (ref 13–44)
MAGNESIUM SERPL-MCNC: 1.8 MG/DL — SIGNIFICANT CHANGE UP (ref 1.6–2.6)
MAGNESIUM SERPL-MCNC: 1.8 MG/DL — SIGNIFICANT CHANGE UP (ref 1.6–2.6)
MCHC RBC-ENTMCNC: 29.8 PG — SIGNIFICANT CHANGE UP (ref 27–34)
MCHC RBC-ENTMCNC: 29.8 PG — SIGNIFICANT CHANGE UP (ref 27–34)
MCHC RBC-ENTMCNC: 33 GM/DL — SIGNIFICANT CHANGE UP (ref 32–36)
MCHC RBC-ENTMCNC: 33 GM/DL — SIGNIFICANT CHANGE UP (ref 32–36)
MCV RBC AUTO: 90.3 FL — SIGNIFICANT CHANGE UP (ref 80–100)
MCV RBC AUTO: 90.3 FL — SIGNIFICANT CHANGE UP (ref 80–100)
MONOCYTES # BLD AUTO: 0.62 K/UL — SIGNIFICANT CHANGE UP (ref 0–0.9)
MONOCYTES # BLD AUTO: 0.62 K/UL — SIGNIFICANT CHANGE UP (ref 0–0.9)
MONOCYTES NFR BLD AUTO: 8.2 % — SIGNIFICANT CHANGE UP (ref 2–14)
MONOCYTES NFR BLD AUTO: 8.2 % — SIGNIFICANT CHANGE UP (ref 2–14)
NEUTROPHILS # BLD AUTO: 4.66 K/UL — SIGNIFICANT CHANGE UP (ref 1.8–7.4)
NEUTROPHILS # BLD AUTO: 4.66 K/UL — SIGNIFICANT CHANGE UP (ref 1.8–7.4)
NEUTROPHILS NFR BLD AUTO: 61.9 % — SIGNIFICANT CHANGE UP (ref 43–77)
NEUTROPHILS NFR BLD AUTO: 61.9 % — SIGNIFICANT CHANGE UP (ref 43–77)
NRBC # BLD: 0 /100 WBCS — SIGNIFICANT CHANGE UP (ref 0–0)
NRBC # BLD: 0 /100 WBCS — SIGNIFICANT CHANGE UP (ref 0–0)
PHOSPHATE SERPL-MCNC: 3.7 MG/DL — SIGNIFICANT CHANGE UP (ref 2.5–4.5)
PHOSPHATE SERPL-MCNC: 3.7 MG/DL — SIGNIFICANT CHANGE UP (ref 2.5–4.5)
PLATELET # BLD AUTO: 179 K/UL — SIGNIFICANT CHANGE UP (ref 150–400)
PLATELET # BLD AUTO: 179 K/UL — SIGNIFICANT CHANGE UP (ref 150–400)
POTASSIUM SERPL-MCNC: 4.2 MMOL/L — SIGNIFICANT CHANGE UP (ref 3.5–5.3)
POTASSIUM SERPL-MCNC: 4.2 MMOL/L — SIGNIFICANT CHANGE UP (ref 3.5–5.3)
POTASSIUM SERPL-SCNC: 4.2 MMOL/L — SIGNIFICANT CHANGE UP (ref 3.5–5.3)
POTASSIUM SERPL-SCNC: 4.2 MMOL/L — SIGNIFICANT CHANGE UP (ref 3.5–5.3)
PROT SERPL-MCNC: 6.9 G/DL — SIGNIFICANT CHANGE UP (ref 6–8.3)
PROT SERPL-MCNC: 6.9 G/DL — SIGNIFICANT CHANGE UP (ref 6–8.3)
PROTHROM AB SERPL-ACNC: 11.5 SEC — SIGNIFICANT CHANGE UP (ref 9.5–13)
PROTHROM AB SERPL-ACNC: 11.5 SEC — SIGNIFICANT CHANGE UP (ref 9.5–13)
RBC # BLD: 3.93 M/UL — LOW (ref 4.2–5.8)
RBC # BLD: 3.93 M/UL — LOW (ref 4.2–5.8)
RBC # FLD: 15.6 % — HIGH (ref 10.3–14.5)
RBC # FLD: 15.6 % — HIGH (ref 10.3–14.5)
RH IG SCN BLD-IMP: POSITIVE — SIGNIFICANT CHANGE UP
RH IG SCN BLD-IMP: POSITIVE — SIGNIFICANT CHANGE UP
SODIUM SERPL-SCNC: 134 MMOL/L — LOW (ref 135–145)
SODIUM SERPL-SCNC: 134 MMOL/L — LOW (ref 135–145)
UFH PPP CHRO-ACNC: 0.45 IU/ML — SIGNIFICANT CHANGE UP (ref 0.3–0.7)
UFH PPP CHRO-ACNC: 0.45 IU/ML — SIGNIFICANT CHANGE UP (ref 0.3–0.7)
WBC # BLD: 7.53 K/UL — SIGNIFICANT CHANGE UP (ref 3.8–10.5)
WBC # BLD: 7.53 K/UL — SIGNIFICANT CHANGE UP (ref 3.8–10.5)
WBC # FLD AUTO: 7.53 K/UL — SIGNIFICANT CHANGE UP (ref 3.8–10.5)
WBC # FLD AUTO: 7.53 K/UL — SIGNIFICANT CHANGE UP (ref 3.8–10.5)

## 2023-12-17 PROCEDURE — 99292 CRITICAL CARE ADDL 30 MIN: CPT

## 2023-12-17 PROCEDURE — 93010 ELECTROCARDIOGRAM REPORT: CPT

## 2023-12-17 PROCEDURE — 99291 CRITICAL CARE FIRST HOUR: CPT | Mod: 25

## 2023-12-17 PROCEDURE — 71045 X-RAY EXAM CHEST 1 VIEW: CPT | Mod: 26

## 2023-12-17 RX ORDER — MAGNESIUM SULFATE 500 MG/ML
2 VIAL (ML) INJECTION ONCE
Refills: 0 | Status: COMPLETED | OUTPATIENT
Start: 2023-12-17 | End: 2023-12-17

## 2023-12-17 RX ADMIN — BUDESONIDE AND FORMOTEROL FUMARATE DIHYDRATE 2 PUFF(S): 160; 4.5 AEROSOL RESPIRATORY (INHALATION) at 20:44

## 2023-12-17 RX ADMIN — Medication 50 MILLIGRAM(S): at 13:35

## 2023-12-17 RX ADMIN — Medication 9 UNIT(S): at 09:09

## 2023-12-17 RX ADMIN — ISOSORBIDE DINITRATE 30 MILLIGRAM(S): 5 TABLET ORAL at 05:19

## 2023-12-17 RX ADMIN — Medication 9 UNIT(S): at 17:23

## 2023-12-17 RX ADMIN — ISOSORBIDE DINITRATE 30 MILLIGRAM(S): 5 TABLET ORAL at 12:13

## 2023-12-17 RX ADMIN — Medication 50 MILLIGRAM(S): at 21:02

## 2023-12-17 RX ADMIN — CARVEDILOL PHOSPHATE 25 MILLIGRAM(S): 80 CAPSULE, EXTENDED RELEASE ORAL at 17:23

## 2023-12-17 RX ADMIN — Medication 50 MILLIGRAM(S): at 05:19

## 2023-12-17 RX ADMIN — Medication 1: at 12:15

## 2023-12-17 RX ADMIN — Medication 25 GRAM(S): at 01:56

## 2023-12-17 RX ADMIN — CARVEDILOL PHOSPHATE 25 MILLIGRAM(S): 80 CAPSULE, EXTENDED RELEASE ORAL at 05:19

## 2023-12-17 RX ADMIN — ISOSORBIDE DINITRATE 30 MILLIGRAM(S): 5 TABLET ORAL at 16:41

## 2023-12-17 RX ADMIN — INSULIN GLARGINE 12 UNIT(S): 100 INJECTION, SOLUTION SUBCUTANEOUS at 21:02

## 2023-12-17 RX ADMIN — CHLORHEXIDINE GLUCONATE 1 APPLICATION(S): 213 SOLUTION TOPICAL at 21:03

## 2023-12-17 RX ADMIN — HEPARIN SODIUM 14 UNIT(S)/HR: 5000 INJECTION INTRAVENOUS; SUBCUTANEOUS at 05:18

## 2023-12-17 RX ADMIN — Medication 81 MILLIGRAM(S): at 12:13

## 2023-12-17 RX ADMIN — ATORVASTATIN CALCIUM 80 MILLIGRAM(S): 80 TABLET, FILM COATED ORAL at 21:01

## 2023-12-17 RX ADMIN — Medication 9 UNIT(S): at 12:16

## 2023-12-17 RX ADMIN — HEPARIN SODIUM 14 UNIT(S)/HR: 5000 INJECTION INTRAVENOUS; SUBCUTANEOUS at 21:00

## 2023-12-17 NOTE — PROGRESS NOTE ADULT - SUBJECTIVE AND OBJECTIVE BOX
LD VALENCIA  58y Male  MRN:[id number]    Patient is a 58y old  Male who presents with a chief complaint of NSTEMI (01 Nov 2023 20:29)    HPI:  59yo M w/ hx HTN, CAD w/ 1 stent in 2009, ICH (2008) presenting with abn ekg. Patient presented to [facility] where he was found to have STEMI, recommended to get cath however patient did not want to get it there so it left and came here.  Patient initially had cough, congestion, fever, was placed on antibiotics on Sunday.  Started feeling nauseous and had a presyncopal event after which he presented to ED last night.  Had chest pain as well.  Chest pain is midsternal.  Not currently having chest pain.  Received 4 aspirin 30 min pta. (01 Nov 2023 15:11)      Patient seen and evaluated at bedside in CCU. interval events noted    Interval HPI: remain in ccu. IABP in place        PAST MEDICAL & SURGICAL HISTORY:  HTN (hypertension)      CAD (coronary artery disease)  2009; stent      Intracranial hemorrhage  2008      Respiratory arrest  december 1st      Myocardial infarction, unspecified MI type, unspecified artery      History of coronary artery stent placement          REVIEW OF SYSTEMS:  as per hpi     VITALS:   Vital Signs Last 24 Hrs  T(C): 36.8 (17 Dec 2023 09:00), Max: 36.8 (17 Dec 2023 00:00)  T(F): 98.2 (17 Dec 2023 09:00), Max: 98.3 (17 Dec 2023 00:00)  HR: 79 (17 Dec 2023 10:00) (71 - 91)  BP: --  BP(mean): --  RR: 20 (17 Dec 2023 10:00) (11 - 26)  SpO2: 95% (17 Dec 2023 09:00) (95% - 97%)    Parameters below as of 17 Dec 2023 09:00  Patient On (Oxygen Delivery Method): room air              PHYSICAL EXAM:  GENERAL: NAD, comfortable   HEAD:  Atraumatic, Normocephalic  EYES: EOMI, PERRLA, conjunctiva and sclera clear  NECK: Supple, No JVD   CHEST/LUNG: Clear to auscultation bilaterally; No wheeze  HEART: Regular rate and rhythm; No murmurs, rubs, or gallops  ABDOMEN: Soft, Nontender, Nondistended; Bowel sounds present  EXTREMITIES:  2+ Peripheral Pulses, No clubbing, cyanosis, or edema  NEUROLOGY: nonfocal  SKIN: +rash  +iabp    Consultant(s) Notes Reviewed:  [x ] YES  [ ] NO  Care Discussed with Consultants/Other Providers [ x] YES  [ ] NO    MEDS:   MEDICATIONS  (STANDING):  aspirin  chewable 81 milliGRAM(s) Oral daily  atorvastatin 80 milliGRAM(s) Oral at bedtime  budesonide  80 MICROgram(s)/formoterol 4.5 MICROgram(s) Inhaler 2 Puff(s) Inhalation two times a day  carvedilol 25 milliGRAM(s) Oral every 12 hours  chlorhexidine 2% Cloths 1 Application(s) Topical daily  heparin  Infusion 1300 Unit(s)/Hr (14 mL/Hr) IV Continuous <Continuous>  hydrALAZINE 50 milliGRAM(s) Oral every 8 hours  insulin glargine Injectable (LANTUS) 12 Unit(s) SubCutaneous at bedtime  insulin lispro (ADMELOG) corrective regimen sliding scale   SubCutaneous at bedtime  insulin lispro (ADMELOG) corrective regimen sliding scale   SubCutaneous three times a day before meals  insulin lispro Injectable (ADMELOG) 9 Unit(s) SubCutaneous three times a day before meals  isosorbide   dinitrate Tablet (ISORDIL) 30 milliGRAM(s) Oral three times a day  polyethylene glycol 3350 17 Gram(s) Oral two times a day  senna 2 Tablet(s) Oral at bedtime    MEDICATIONS  (PRN):  hydrOXYzine hydrochloride 25 milliGRAM(s) Oral two times a day PRN Anxiety      ALLERGIES:  penicillins (Unknown)      LABS:                                                            11.7   7.53  )-----------( 179      ( 17 Dec 2023 01:14 )             35.5   12-17    134<L>  |  103  |  35<H>  ----------------------------<  138<H>  4.2   |  19<L>  |  1.16    Ca    9.7      17 Dec 2023 01:14  Phos  3.7     12-17  Mg     1.8     12-17    TPro  6.9  /  Alb  3.9  /  TBili  0.2  /  DBili  x   /  AST  23  /  ALT  84<H>  /  AlkPhos  61  12-17      < from: CT Abdomen and Pelvis No Cont (11.28.23 @ 03:38) >  IMPRESSION:  Mildly dilated colon and prominent but not overly dilated small bowel   with air-fluid levels. No discrete transition point. Findings are   suggestive of ileus.    Intra-aortic balloon pump with the inferior marker at the level of the   inferior mesenteric artery and the balloon overlying the origins of the   renal arteries, celiac artery, and superior mesenteric artery. Consider   repositioning. Concern for IABP positioning was discussed with LANETTE Hawthorne   on 11/28/2023 at 3:42 AM by Dr. Shepard.    < end of copied text >          < from: Colonoscopy (11.17.23 @ 10:35) >                                                                                                        Impression:          - The entire examined colon is normal on direct and retroflexion views.                       - No specimens collected.  Recommendation:      - Resume previous diet today.                       - No large polyps or masses detected, No objection from GI standpoint to                 proceed with heart transplantation/advanced heart therapies.                       - Please call back should any further questions or concerns arise.    < end of copied text >     < from: Xray Chest 1 View- PORTABLE-Urgent (11.01.23 @ 07:42) >    IMPRESSION:  Clear lungs.    ---End of Report ---        < end of copied text >  < from: TTE W or WO Ultrasound Enhancing Agent (11.01.23 @ 10:23) >  _____________________________     CONCLUSIONS:      1. Left ventricular cavity is moderately dilated. Left ventricular wall thickness is normal. Left ventricular systolic function is severely decreased with an ejection fraction of 32 % by Chinchilla's method of disks. Regional wall motion abnormalities present.   2. Multiple segmental abnormalities exist. See findings.   3. There is moderate (grade 2) left ventricular diastolic dysfunction, with indeterminant filling pressure.   4. Normal right ventricular cavity size, wall thickness, and systolic function.   5. No significant valvular disease.   6. No pericardial effusion seen.   7. Compared to the transthoracic echocardiogram performed on 1/25/2017 the areas of akinesis are unchanged but there has been a decline in LV systolic function with new areas of hypokinesis.    __________________________________________________________________    < end of copied text >  < from: TTE Limited W or WO Ultrasound Enhancing Agent (11.02.23 @ 07:41) >  __________________________     CONCLUSIONS:      1. After obtaining consent, Definity ultrasound enhancing agent was given for enhanced left ventricular opacification and improved delineation of the left ventricular endocardial borders. Left ventricular systolic function is severely decreased with a calculated ejection fraction of 22 % by the Chinchilla's biplane method of disks. There is a left ventricular thrombus.   2. Findings were discussed with Litzy BOSS on 11/2/2023 at 8.49am.   3. There is a left ventricular thrombus.    _________________________________________________________________    < end of copied text >   LD VALENCIA  58y Male  MRN:[id number]    Patient is a 58y old  Male who presents with a chief complaint of NSTEMI (01 Nov 2023 20:29)    HPI:  57yo M w/ hx HTN, CAD w/ 1 stent in 2009, ICH (2008) presenting with abn ekg. Patient presented to [facility] where he was found to have STEMI, recommended to get cath however patient did not want to get it there so it left and came here.  Patient initially had cough, congestion, fever, was placed on antibiotics on Sunday.  Started feeling nauseous and had a presyncopal event after which he presented to ED last night.  Had chest pain as well.  Chest pain is midsternal.  Not currently having chest pain.  Received 4 aspirin 30 min pta. (01 Nov 2023 15:11)      Patient seen and evaluated at bedside in CCU. interval events noted    Interval HPI: remain in ccu. IABP in place        PAST MEDICAL & SURGICAL HISTORY:  HTN (hypertension)      CAD (coronary artery disease)  2009; stent      Intracranial hemorrhage  2008      Respiratory arrest  december 1st      Myocardial infarction, unspecified MI type, unspecified artery      History of coronary artery stent placement          REVIEW OF SYSTEMS:  as per hpi     VITALS:   Vital Signs Last 24 Hrs  T(C): 36.8 (17 Dec 2023 09:00), Max: 36.8 (17 Dec 2023 00:00)  T(F): 98.2 (17 Dec 2023 09:00), Max: 98.3 (17 Dec 2023 00:00)  HR: 79 (17 Dec 2023 10:00) (71 - 91)  BP: --  BP(mean): --  RR: 20 (17 Dec 2023 10:00) (11 - 26)  SpO2: 95% (17 Dec 2023 09:00) (95% - 97%)    Parameters below as of 17 Dec 2023 09:00  Patient On (Oxygen Delivery Method): room air              PHYSICAL EXAM:  GENERAL: NAD, comfortable   HEAD:  Atraumatic, Normocephalic  EYES: EOMI, PERRLA, conjunctiva and sclera clear  NECK: Supple, No JVD   CHEST/LUNG: Clear to auscultation bilaterally; No wheeze  HEART: Regular rate and rhythm; No murmurs, rubs, or gallops  ABDOMEN: Soft, Nontender, Nondistended; Bowel sounds present  EXTREMITIES:  2+ Peripheral Pulses, No clubbing, cyanosis, or edema  NEUROLOGY: nonfocal  SKIN: +rash  +iabp    Consultant(s) Notes Reviewed:  [x ] YES  [ ] NO  Care Discussed with Consultants/Other Providers [ x] YES  [ ] NO    MEDS:   MEDICATIONS  (STANDING):  aspirin  chewable 81 milliGRAM(s) Oral daily  atorvastatin 80 milliGRAM(s) Oral at bedtime  budesonide  80 MICROgram(s)/formoterol 4.5 MICROgram(s) Inhaler 2 Puff(s) Inhalation two times a day  carvedilol 25 milliGRAM(s) Oral every 12 hours  chlorhexidine 2% Cloths 1 Application(s) Topical daily  heparin  Infusion 1300 Unit(s)/Hr (14 mL/Hr) IV Continuous <Continuous>  hydrALAZINE 50 milliGRAM(s) Oral every 8 hours  insulin glargine Injectable (LANTUS) 12 Unit(s) SubCutaneous at bedtime  insulin lispro (ADMELOG) corrective regimen sliding scale   SubCutaneous at bedtime  insulin lispro (ADMELOG) corrective regimen sliding scale   SubCutaneous three times a day before meals  insulin lispro Injectable (ADMELOG) 9 Unit(s) SubCutaneous three times a day before meals  isosorbide   dinitrate Tablet (ISORDIL) 30 milliGRAM(s) Oral three times a day  polyethylene glycol 3350 17 Gram(s) Oral two times a day  senna 2 Tablet(s) Oral at bedtime    MEDICATIONS  (PRN):  hydrOXYzine hydrochloride 25 milliGRAM(s) Oral two times a day PRN Anxiety      ALLERGIES:  penicillins (Unknown)      LABS:                                                            11.7   7.53  )-----------( 179      ( 17 Dec 2023 01:14 )             35.5   12-17    134<L>  |  103  |  35<H>  ----------------------------<  138<H>  4.2   |  19<L>  |  1.16    Ca    9.7      17 Dec 2023 01:14  Phos  3.7     12-17  Mg     1.8     12-17    TPro  6.9  /  Alb  3.9  /  TBili  0.2  /  DBili  x   /  AST  23  /  ALT  84<H>  /  AlkPhos  61  12-17      < from: CT Abdomen and Pelvis No Cont (11.28.23 @ 03:38) >  IMPRESSION:  Mildly dilated colon and prominent but not overly dilated small bowel   with air-fluid levels. No discrete transition point. Findings are   suggestive of ileus.    Intra-aortic balloon pump with the inferior marker at the level of the   inferior mesenteric artery and the balloon overlying the origins of the   renal arteries, celiac artery, and superior mesenteric artery. Consider   repositioning. Concern for IABP positioning was discussed with LANETTE Hawthorne   on 11/28/2023 at 3:42 AM by Dr. Shepard.    < end of copied text >          < from: Colonoscopy (11.17.23 @ 10:35) >                                                                                                        Impression:          - The entire examined colon is normal on direct and retroflexion views.                       - No specimens collected.  Recommendation:      - Resume previous diet today.                       - No large polyps or masses detected, No objection from GI standpoint to                 proceed with heart transplantation/advanced heart therapies.                       - Please call back should any further questions or concerns arise.    < end of copied text >     < from: Xray Chest 1 View- PORTABLE-Urgent (11.01.23 @ 07:42) >    IMPRESSION:  Clear lungs.    ---End of Report ---        < end of copied text >  < from: TTE W or WO Ultrasound Enhancing Agent (11.01.23 @ 10:23) >  _____________________________     CONCLUSIONS:      1. Left ventricular cavity is moderately dilated. Left ventricular wall thickness is normal. Left ventricular systolic function is severely decreased with an ejection fraction of 32 % by Chinchilla's method of disks. Regional wall motion abnormalities present.   2. Multiple segmental abnormalities exist. See findings.   3. There is moderate (grade 2) left ventricular diastolic dysfunction, with indeterminant filling pressure.   4. Normal right ventricular cavity size, wall thickness, and systolic function.   5. No significant valvular disease.   6. No pericardial effusion seen.   7. Compared to the transthoracic echocardiogram performed on 1/25/2017 the areas of akinesis are unchanged but there has been a decline in LV systolic function with new areas of hypokinesis.    __________________________________________________________________    < end of copied text >  < from: TTE Limited W or WO Ultrasound Enhancing Agent (11.02.23 @ 07:41) >  __________________________     CONCLUSIONS:      1. After obtaining consent, Definity ultrasound enhancing agent was given for enhanced left ventricular opacification and improved delineation of the left ventricular endocardial borders. Left ventricular systolic function is severely decreased with a calculated ejection fraction of 22 % by the Chinchilla's biplane method of disks. There is a left ventricular thrombus.   2. Findings were discussed with Litzy BOSS on 11/2/2023 at 8.49am.   3. There is a left ventricular thrombus.    _________________________________________________________________    < end of copied text >

## 2023-12-17 NOTE — PROGRESS NOTE ADULT - ASSESSMENT
====================ASSESSMENT ==============  58 male with HTN, CAD (s/p PCI 2008), HFrEF, CVA 2018, and T2DM presenting with chest pressure and unknown tachycardia that was shocked x1, [facility] 11/1 found to have in-stent restenosis of pLAD and  of RCA with elevated RA and PA pressures and severely decreased. Admitted to CICU for management of cardiogenic shock and ADHF requiring IABP 11/1 -11/7, with hospital course c/b vfib arrest requiring reinsertion of IABP. Not recommended for ATs per HF. Currently listed for transplant status 2.    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 AT evaluation launched 11/10, he is ABO A. He was discussed during TSC on 11/16 and was approved for listing, however was found to be Bacteremic with 11/17 Blc Cx growing mixed cultures Staph epi and Enterococcus faecalis and started on IV Vanco. Repeat cultures from 11/18 reveal NGTD and therefore was cleared by ID for listing.     He appears adequately supported on IABP at 1:1, electrically quiescent on oral Amiodarone. Cr is improving with holding diuretics. Labs otherwise notable for worsening thrombocytopenia. Awaiting suitable donor. Now with GM + rods in blood culture on 11/30 (reported on 12/4), restarted on vancomycin, and status 7, repeat cultures now negative x48 hours (12/6), now status 2 again.       ====================== NEUROLOGY=====================  Anxiety  - No Seroquel/antipsychotics since vfib arrest and prolonged QTC  - Psych Eval, recommended SSRI, but pt. refused   - Psych recommending atarax PRN  - Continue to monitor mental status    PT/Conditioning  - Continue band exercises while on bedrest s/t IABP    ==================== RESPIRATORY======================  Acute Hypoxemic Respiratory Failure  - s/p x2 intubations for cardiogenic pulm edema and the in setting of cardiac arrest, resolved - extubated 11/10  - Currently maintaining >95% sats on room air  - Continue incentive spirometry and monitoring of sp02    Asthma  - c/w albuterol, symbicort and spiriva  - On trelegy at home  - Continue to monitor SpO2 with goal >94%    ====================CARDIOVASCULAR==================  Vfib arrest i/s/o ischemia  - Lido gtt off 11/13  - PO Amio load - total of 5g per EP complete 11/17  - Amio dc'd 12/5 for rising LFTs  - Keep K > 4, Mag > 2.2     Cardiogenic shock requiring IABP (11/1- 11/7, 11/9-)  - Likely 2/2 NSTEMI and ADHF  - 11/1 LHC: pLAD 100 % in-stent restenosis & mRCA, 100 %. PCWP 30. IABP placed.  - 11/1 TTE: LV dilated. EF 32 %. Regional WMAs present, mod (grade 2) LV diastolic dysfunction  - 11/2 TTE: EF 22% and + LV thrombus  - IABP swapped 11/20 to RFA, continue 1:1 support - switched sensor to R radial a-line 12/13 due to poor sensing on groin line  - Off Milrinone gtt @ 7:30 am 11/11  - [place] d/c'd due to elevated K levels  - c/w coreg 25 BID for GDMT  - UNOS status 2 as of 12/6  - hydralazine 50 TID and ISD 30 TID for AL reduction    NSTEMI iso stent re-occlusion of pLAD and 100%  of RCA  - EKG on admission w/ LBBB  - DAPT: c/w ASA, Brilinta d/c'd per transplant w/u  - c/w lipitor 80  - cMR deferred given necessity of IABP  - CT sx not recommending CABG, undergoing AT eval    LV thrombus  - c/w heparin gtt w/ therapeutic PTT    ===================== RENAL =========================  Non-oliguric ARIC, resolved   - Baseline Cr: 1-1.22  - Renal US: no evidence of renal artery stenosis  - Trend BMP, lytes daily, replace as needed  - Continue Strict I/Os, avoid nephrotoxins    Mild Hyponatremia/Hyperkalemia iso ARIC  - Continue fluid restriction   - Urea powder d/c'd per renal  - Trend daily  - Continue monitoring urine output, lytes, SCr/ BUN  - Replete lytes prn with goal K >4 and Mg >2    =============== GASTROINTESTINAL===================  Constipation/ileus, resolved  - regular diet  - bowel reg prn    ===================ENDO====================  Type 2 DM  - A1c 8.3   - Continue lantus, premeal, low ISS  - continue FS    ===================HEMATOLOGIC/ONC ===================  - H/H & plts stable  - Monitor H/H and plts  - VTE PPX: heparin gtt    ==================INFECTIOUS DISEASE================  # Positive blood culture, resolved  - Repeat BCx drawn 11/30 for leukocytosis to 12k - positive on 12/4, G+ rods in anaerobic bottle, suspect contaminant  - Repeat cultures x2 sent 12/4 per transplant ID recs - no growth to date  - Vancomycin by trough (12/4-12/6)  - Final microbial ID: Cutibacterium acnes, per ID this is likely to be a skin contaminant, vanc dc'd.   - Dental eval 12/7 to rule out infectious etiology that would have led to bacteremia, no significant findings on exam.     # Enterococcus faecalis bacteremia, resolved  - BCx 11/17+ for enterococcus faecalis x2, Staph epi x1 (likely contaminant)- pan sensitive   - Urine cx 11/18 + enterococcus faecalis  - BCx 11/18 no growth   - IABP site swapped to RFA 11/20  - s/p Vancomycin 1g q12h (11/18-11/27)  - CT A/P negative for infectious pathology    # COVID, resolved  - Off airborne precautions 11/11    # Pre-transplant ID w/u   - Trend ID recs for serologies   - Colonoscopy 11/17 - normal   - Chest CT 11/17 - improved LLL aeration  - s/p immunizations    ==================DERMATOLOGY================  # Upper Extremity Rash  - Patient developed bilateral upper extremity rash after receiving Hepatitis A & B immunizations on 11/24  - Dermatology consulted 12/5 - not likely to be related to vanco  - Recommend clobetasol 0.05% BID to affected areas   - RVP repeated to rule out viral etiology, negative   ====================ASSESSMENT ==============  58 male with HTN, CAD (s/p PCI 2008), HFrEF, CVA 2018, and T2DM presenting with chest pressure and unknown tachycardia that was shocked x1, [facility] 11/1 found to have in-stent restenosis of pLAD and  of RCA with elevated RA and PA pressures and severely decreased. Admitted to CICU for management of cardiogenic shock and ADHF requiring IABP 11/1 -11/7, with hospital course c/b vfib arrest requiring reinsertion of IABP. Not recommended for ATs per HF. Currently listed for transplant status 2.    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 AT evaluation launched 11/10, he is ABO A. He was discussed during TSC on 11/16 and was approved for listing, however was found to be Bacteremic with 11/17 Blc Cx growing mixed cultures Staph epi and Enterococcus faecalis and started on IV Vanco. Repeat cultures from 11/18 reveal NGTD and therefore was cleared by ID for listing.     He appears adequately supported on IABP at 1:1, electrically quiescent on oral Amiodarone. Cr is improving with holding diuretics. Labs otherwise notable for worsening thrombocytopenia. Awaiting suitable donor. Now with GM + rods in blood culture on 11/30 (reported on 12/4), restarted on vancomycin, and status 7, repeat cultures now negative x48 hours (12/6), now status 2 again.       ====================== NEUROLOGY=====================  Anxiety  - No Seroquel/antipsychotics since vfib arrest and prolonged QTC  - Psych Eval, recommended SSRI, but pt. refused   - Psych recommending atarax PRN  - Continue to monitor mental status    PT/Conditioning  - Continue band exercises while on bedrest s/t IABP    ==================== RESPIRATORY======================  Acute Hypoxemic Respiratory Failure  - s/p x2 intubations for cardiogenic pulm edema and the in setting of cardiac arrest, resolved - extubated 11/10  - Currently maintaining >95% sats on room air  - Continue incentive spirometry and monitoring of sp02    Asthma  - c/w albuterol, symbicort and spiriva  - On trelegy at home  - Continue to monitor SpO2 with goal >94%    ====================CARDIOVASCULAR==================  Vfib arrest i/s/o ischemia  - Lido gtt off 11/13  - PO Amio load - total of 5g per EP complete 11/17  - Amio dc'd 12/5 for rising LFTs  - Keep K > 4, Mag > 2.2     Cardiogenic shock requiring IABP (11/1- 11/7, 11/9-)  - Likely 2/2 NSTEMI and ADHF  - 11/1 LHC: pLAD 100 % in-stent restenosis & mRCA, 100 %. PCWP 30. IABP placed.  - 11/1 TTE: LV dilated. EF 32 %. Regional WMAs present, mod (grade 2) LV diastolic dysfunction  - 11/2 TTE: EF 22% and + LV thrombus  - IABP swapped 11/20 to RFA, continue 1:1 support - switched sensor to R radial a-line 12/13 due to poor sensing on groin line  - Off Milrinone gtt @ 7:30 am 11/11  - [place] d/c'd due to elevated K levels  - c/w coreg 25 BID for GDMT  - UNOS status 2 as of 12/6  - hydralazine 50 TID and ISD 30 TID for AL reduction    NSTEMI iso stent re-occlusion of pLAD and 100%  of RCA  - EKG on admission w/ LBBB  - DAPT: c/w ASA, Brilinta d/c'd per transplant w/u  - c/w lipitor 80  - cMR deferred given necessity of IABP  - CT sx not recommending CABG, undergoing AT eval    LV thrombus  - c/w heparin gtt w/ therapeutic PTT    ===================== RENAL =========================  Non-oliguric ARIC, resolved   - Baseline Cr: 1-1.22  - Renal US: no evidence of renal artery stenosis  - Trend BMP, lytes daily, replace as needed  - Continue Strict I/Os, avoid nephrotoxins    Mild Hyponatremia/Hyperkalemia iso ARIC  - Continue fluid restriction   - Urea powder d/c'd per renal  - Trend daily  - Continue monitoring urine output, lytes, SCr/ BUN  - Replete lytes prn with goal K >4 and Mg >2    =============== GASTROINTESTINAL===================  Constipation/ileus, resolved  - regular diet  - bowel reg prn    ===================ENDO====================  Type 2 DM  - A1c 8.3   - Continue lantus, premeal, low ISS  - continue FS    ===================HEMATOLOGIC/ONC ===================  - H/H & plts stable  - Monitor H/H and plts  - VTE PPX: heparin gtt    ==================INFECTIOUS DISEASE================  # Positive blood culture, resolved  - Repeat BCx drawn 11/30 for leukocytosis to 12k - positive on 12/4, G+ rods in anaerobic bottle, suspect contaminant  - Repeat cultures x2 sent 12/4 per transplant ID recs - no growth to date  - Vancomycin by trough (12/4-12/6)  - Final microbial ID: Cutibacterium acnes, per ID this is likely to be a skin contaminant, vanc dc'd.   - Dental eval 12/7 to rule out infectious etiology that would have led to bacteremia, no significant findings on exam.     # Enterococcus faecalis bacteremia, resolved  - BCx 11/17+ for enterococcus faecalis x2, Staph epi x1 (likely contaminant)- pan sensitive   - Urine cx 11/18 + enterococcus faecalis  - BCx 11/18 no growth   - IABP site swapped to RFA 11/20  - s/p Vancomycin 1g q12h (11/18-11/27)  - CT A/P negative for infectious pathology    # COVID, resolved  - Off airborne precautions 11/11    # Pre-transplant ID w/u   - Trend ID recs for serologies   - Colonoscopy 11/17 - normal   - Chest CT 11/17 - improved LLL aeration  - s/p immunizations    ==================DERMATOLOGY================  # Upper Extremity Rash  - Patient developed bilateral upper extremity rash after receiving Hepatitis A & B immunizations on 11/24  - Dermatology consulted 12/5 - not likely to be related to vanco  - Recommend clobetasol 0.05% BID to affected areas   - RVP repeated to rule out viral etiology, negative

## 2023-12-17 NOTE — PROGRESS NOTE ADULT - ASSESSMENT
59 yo male h/o htn, cad s/p pci, ICH, here with NSTEMI  s/p intubation and cath. now in CCU    NSTEMI  s/p  cath  cath results noted. multi vessel dz., CTSx f/u.   hep gtt  pt with vtach/fib arrest again on 11/8. s/p ACLS and ROSC.   now extubated  IABP in place  mngt as per CCU  plan for transplant as per HF and transplant team  transplant w/u ongoing.   s/p colonoscopy 11/17. normal  now listed for transplant as of 11/22    LV thrombus   hep gtt    acute on chronic systolic heart failure  heart failure team f/u    pna  resolved     covid  resolved  now off isolation     h/o ICH  resolved    agitation  psy consult f/u    bacteremia  id following  iv abx  f/u repeat cult  - ngtd    vomiting and abd pain  ileus on CT  bowel regimen  gi f/u  +bm    now resolved.     IABP malposition on CT  mngt as per ccu. iabp adjusted    rash  possible drug reaction  derm f/u  improving    mngt as per CCU team          Advanced care planning was discussed with patient and family.  Advanced care planning forms were reviewed and discussed as appropriate.  Differential diagnosis and plan of care discussed with patient after the evaluation.   Pain assessed and judicious use of narcotics when appropriate was discussed.  Importance of Fall prevention discussed.  Counseling on Smoking and Alcohol cessation was offered when appropriate.  Counseling on Diet, exercise, and medication compliance was done.       Approx 75 minutes spent.

## 2023-12-17 NOTE — PROGRESS NOTE ADULT - SUBJECTIVE AND OBJECTIVE BOX
DEVORAH VALENCIA  MRN-[id number]  Patient is a 58y old  Male who presents with a chief complaint of Cardiogenic shock (17 Dec 2023 06:46)    HPI:  pt seen and approx 1:30 pm  in [facility]    57yo M w/ hx HTN, CAD w/ 1 stent in , ICH () presenting with abn ekg. Patient presented to [facility] where he was found to have STEMI, recommended to get cath however patient did not want to get it there so it left and came here.  Patient initially had cough, congestion, fever, was placed on antibiotics on .  Started feeling nauseous and had a presyncopal event after which he presented to ED last night.  Had chest pain as well.  Chest pain is midsternal.  Not currently having chest pain.  Received 4 aspirin 30 min pta. (2023 15:11)      Hospital Course:    24 HOUR EVENTS:    REVIEW OF SYSTEMS:    CONSTITUTIONAL: No weakness, fevers or chills  EYES/ENT: No visual changes;  No vertigo or throat pain   NECK: No pain or stiffness  RESPIRATORY: No cough, wheezing, hemoptysis; No shortness of breath  CARDIOVASCULAR: No chest pain or palpitations  GASTROINTESTINAL: No abdominal or epigastric pain. No nausea, vomiting, or hematemesis; No diarrhea or constipation. No melena or hematochezia.  GENITOURINARY: No dysuria, frequency or hematuria  NEUROLOGICAL: No numbness or weakness  SKIN: No itching, rashes      ICU Vital Signs Last 24 Hrs  T(C): 36.8 (17 Dec 2023 14:00), Max: 36.8 (17 Dec 2023 00:00)  T(F): 98.2 (17 Dec 2023 14:00), Max: 98.3 (17 Dec 2023 00:00)  HR: 75 (17 Dec 2023 19:00) (69 - 82)  BP: --  BP(mean): --  ABP: --  ABP(mean): --  RR: 16 (17 Dec 2023 19:00) (11 - 23)  SpO2: 95% (17 Dec 2023 09:00) (95% - 97%)    O2 Parameters below as of 17 Dec 2023 19:00  Patient On (Oxygen Delivery Method): room air            CVP(mm Hg): --  CO: --  CI: --  PA: --  PA(mean): --  PA(direct): --  PCWP: --  LA: --  RA: --  SVR: --  SVRI: --  PVR: --  PVRI: --  I&O's Summary    16 Dec 2023 07:01  -  17 Dec 2023 07:00  --------------------------------------------------------  IN: 1256 mL / OUT: 2000 mL / NET: -744 mL    17 Dec 2023 07:01  -  17 Dec 2023 19:27  --------------------------------------------------------  IN: 768 mL / OUT: 1050 mL / NET: -282 mL        CAPILLARY BLOOD GLUCOSE    CAPILLARY BLOOD GLUCOSE      POCT Blood Glucose.: 99 mg/dL (17 Dec 2023 17:16)      PHYSICAL EXAM:  GENERAL: No acute distress, well-developed  HEAD:  Atraumatic, Normocephalic  EYES: EOMI, PERRLA, conjunctiva and sclera clear  NECK: Supple, no lymphadenopathy, no JVD  CHEST/LUNG: CTAB; No wheezes, rales, or rhonchi  HEART: Regular rate and rhythm. Normal S1/S2. No murmurs, rubs, or gallops  ABDOMEN: Soft, non-tender, non-distended; normal bowel sounds, no organomegaly  EXTREMITIES:  2+ peripheral pulses b/l, No clubbing, cyanosis, or edema  NEUROLOGY: A&O x 3, no focal deficits  SKIN: No rashes or lesions    ============================I/O===========================   I&O's Detail    16 Dec 2023 07:01  -  17 Dec 2023 07:00  --------------------------------------------------------  IN:    Heparin: 336 mL    Oral Fluid: 920 mL  Total IN: 1256 mL    OUT:    Voided (mL): 2000 mL  Total OUT: 2000 mL    Total NET: -744 mL      17 Dec 2023 07:01  -  17 Dec 2023 19:27  --------------------------------------------------------  IN:    Heparin: 168 mL    Oral Fluid: 600 mL  Total IN: 768 mL    OUT:    Voided (mL): 1050 mL  Total OUT: 1050 mL    Total NET: -282 mL        ============================ LABS =========================                        11.7   7.53  )-----------( 179      ( 17 Dec 2023 01:14 )             35.5         134<L>  |  103  |  35<H>  ----------------------------<  138<H>  4.2   |  19<L>  |  1.16    Ca    9.7      17 Dec 2023 01:14  Phos  3.7       Mg     1.8         TPro  6.9  /  Alb  3.9  /  TBili  0.2  /  DBili  x   /  AST  23  /  ALT  84<H>  /  AlkPhos  61                  LIVER FUNCTIONS - ( 17 Dec 2023 01:14 )  Alb: 3.9 g/dL / Pro: 6.9 g/dL / ALK PHOS: 61 U/L / ALT: 84 U/L / AST: 23 U/L / GGT: x           PT/INR - ( 17 Dec 2023 01:14 )   PT: 11.5 sec;   INR: 1.10 ratio         PTT - ( 17 Dec 2023 01:14 )  PTT:71.7 sec    Lactate, Blood: 0.9 mmol/L (23 @ 01:14)    Urinalysis Basic - ( 17 Dec 2023 01:14 )    Color: x / Appearance: x / SG: x / pH: x  Gluc: 138 mg/dL / Ketone: x  / Bili: x / Urobili: x   Blood: x / Protein: x / Nitrite: x   Leuk Esterase: x / RBC: x / WBC x   Sq Epi: x / Non Sq Epi: x / Bacteria: x      ======================Micro/Rad/Cardio=================  Telemtry: Reviewed   EKG: Reviewed  CXR: Reviewed  Culture: Reviewed   Echo:   Cath: Cardiac Cath Lab - Adult:   [facility]  Department of Cardiology  [address] [place] [address]  [phone]  Cath Lab Report -- Comprehensive Report  Patient: LD VALENCIA  Study date: 2017  Account number: [id number]  MR number: [id number]  : 1965  Gender: Male  Race: W  Case Physician(s):  Jairon Holguin M.D.  Referring Physician:  Luc Lynn M.D.  INDICATIONS: Unstable angina - CCS4.  HISTORY: The patient has a history of coronary artery disease. The patient  hashypertension and medication-treated dyslipidemia.  PROCEDURE:  --  Left heart catheterization with ventriculography.  --  Left coronary angiography.  --  Right coronary angiography.  TECHNIQUE: The risks and alternatives of the procedures and conscious  sedation were explained to the patient and informed consent was obtained.  Cardiac catheterization performed electively.  Local anesthetic given. Right radial artery access. A 6FR PRELUDE KIT was  inserted in the vessel. Left heart catheterization. Ventriculography was  performed. A 5FR FR4.0 EXPO catheter was utilized. Left coronary artery  angiography. The vessel was injected utilizing a 5FR FL3.5 EXPO catheter.  Right coronary artery angiography. The vessel was injected utilizing a 5FR  FR4.0 EXPO catheter. RADIATION EXPOSURE: 1.1 min.  CONTRAST GIVEN: Omnipaque 55 ml.  MEDICATIONS GIVEN: Midazolam, 1 mg, IV. Fentanyl, 25 mcg, IV. Verapamil  (Isoptin, Calan, Covera), 2.5 mg, IA. Heparin, 3000 units, IA.  VENTRICLES: Global left ventricular function was moderately depressed. EF  estimated was 40 %.  CORONARY VESSELS: The coronary circulation is right dominant.  LM:   --  LM: Normal.  LAD:   --  Proximal LAD: There was a 50 % stenosis.  CX:   --  Circumflex: Normal.  RCA:   --  Mid RCA: There was a 40 % stenosis.  --  Distal RCA: There was a 50 % stenosis.  COMPLICATIONS: There were no complications.  DIAGNOSTIC RECOMMENDATIONS: The patient should continue with the present  medications.  Prepared and signed by  Jairon Holguin M.D.  Signed 2017 12:20:13  HEMODYNAMIC TABLES  Pressures:  Baseline/ Room Air  Pressures:  - HR: 78  Pressures:  - Rhythm:  Pressures:  -- Aortic Pressure (S/D/M): --/--/99  Pressures:  -- Left Ventricle (s/edp): 157/39/--  Outputs:  Baseline/ Room Air  Outputs:  -- CALCULATIONS: Age in years: 51.41  Outputs:  -- CALCULATIONS: Body Surface Area: 2.05  Outputs:  -- CALCULATIONS: Height in cm: 175.00  Outputs:  -- CALCULATIONS: Sex: Male  Outputs:  -- CALCULATIONS: Weight in k.40 (17 @ 21:55)  ======================================================  PAST MEDICAL & SURGICAL HISTORY:  HTN (hypertension)      CAD (coronary artery disease)  ; stent      Intracranial hemorrhage        Respiratory arrest        Myocardial infarction, unspecified MI type, unspecified artery      History of coronary artery stent placement  ====================ASSESSMENT ==============  58 male with HTN, CAD (s/p PCI ), HFrEF, CVA , and T2DM presenting with chest pressure and unknown tachycardia that was shocked x1, [facility]  found to have in-stent restenosis of pLAD and  of RCA with elevated RA and PA pressures and severely decreased. Admitted to CICU for management of cardiogenic shock and ADHF requiring IABP  -, with hospital course c/b vfib arrest requiring reinsertion of IABP. Not recommended for ATs per HF. Currently listed for transplant status 2.    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 AT evaluation launched 11/10, he is ABO A. He was discussed during TSC on  and was approved for listing, however was found to be Bacteremic with  Blc Cx growing mixed cultures Staph epi and Enterococcus faecalis and started on IV Vanco. Repeat cultures from  reveal NGTD and therefore was cleared by ID for listing.     He appears adequately supported on IABP at 1:1, electrically quiescent on oral Amiodarone. Cr is improving with holding diuretics. Labs otherwise notable for worsening thrombocytopenia. Awaiting suitable donor. Now with GM + rods in blood culture on  (reported on ), restarted on vancomycin, and status 7, repeat cultures now negative x48 hours (), now status 2 again.       ====================== NEUROLOGY=====================  Anxiety  - No Seroquel/antipsychotics since vfib arrest and prolonged QTC  - Psych Eval, recommended SSRI, but pt. refused   - Psych recommending atarax PRN  - Continue to monitor mental status    PT/Conditioning  - Continue band exercises while on bedrest s/t IABP    ==================== RESPIRATORY======================  Acute Hypoxemic Respiratory Failure  - s/p x2 intubations for cardiogenic pulm edema and the in setting of cardiac arrest, resolved - extubated 11/10  - Currently maintaining >95% sats on room air  - Continue incentive spirometry and monitoring of sp02    Asthma  - c/w albuterol, symbicort and spiriva  - On trelegy at home  - Continue to monitor SpO2 with goal >94%    ====================CARDIOVASCULAR==================  Vfib arrest i/s/o ischemia  - Lido gtt off   - PO Amio load - total of 5g per EP complete   - Amio dc'd  for rising LFTs  - Keep K > 4, Mag > 2.2     Cardiogenic shock requiring IABP (- , -)  - Likely 2/2 NSTEMI and ADHF  -  LHC: pLAD 100 % in-stent restenosis & mRCA, 100 %. PCWP 30. IABP placed.  -  TTE: LV dilated. EF 32 %. Regional WMAs present, mod (grade 2) LV diastolic dysfunction  -  TTE: EF 22% and + LV thrombus  - IABP swapped  to RFA, continue 1:1 support - switched sensor to R radial a-line  due to poor sensing on groin line  - Off Milrinone gtt @ 7:30 am   - [place] d/c'd due to elevated K levels  - c/w coreg 25 BID for GDMT  - UNOS status 2 as of   - hydralazine 50 TID and ISD 30 TID for AL reduction    NSTEMI iso stent re-occlusion of pLAD and 100%  of RCA  - EKG on admission w/ LBBB  - DAPT: c/w ASA, Brilinta d/c'd per transplant w/u  - c/w lipitor 80  - cMR deferred given necessity of IABP  - CT sx not recommending CABG, undergoing AT eval    LV thrombus  - c/w heparin gtt w/ therapeutic PTT    ===================== RENAL =========================  Non-oliguric ARIC, resolved   - Baseline Cr: 1-  - Renal US: no evidence of renal artery stenosis  - Trend BMP, lytes daily, replace as needed  - Continue Strict I/Os, avoid nephrotoxins    Mild Hyponatremia/Hyperkalemia iso ARIC  - Continue fluid restriction   - Urea powder d/c'd per renal  - Trend daily  - Continue monitoring urine output, lytes, SCr/ BUN  - Replete lytes prn with goal K >4 and Mg >2    =============== GASTROINTESTINAL===================  Constipation/ileus, resolved  - regular diet  - bowel reg prn    ===================ENDO====================  Type 2 DM  - A1c 8.3   - Continue lantus, premeal, low ISS  - continue FS    ===================HEMATOLOGIC/ONC ===================  - H/H & plts stable  - Monitor H/H and plts  - VTE PPX: heparin gtt    ==================INFECTIOUS DISEASE================  # Positive blood culture, resolved  - Repeat BCx drawn  for leukocytosis to 12k - positive on , G+ rods in anaerobic bottle, suspect contaminant  - Repeat cultures x2 sent  per transplant ID recs - no growth to date  - Vancomycin by trough (-)  - Final microbial ID: Cutibacterium acnes, per ID this is likely to be a skin contaminant, vanc dc'd.   - Dental eval  to rule out infectious etiology that would have led to bacteremia, no significant findings on exam.     # Enterococcus faecalis bacteremia, resolved  - BCx + for enterococcus faecalis x2, Staph epi x1 (likely contaminant)- pan sensitive   - Urine cx  + enterococcus faecalis  - BCx  no growth   - IABP site swapped to RFA   - s/p Vancomycin 1g q12h (-)  - CT A/P negative for infectious pathology    # COVID, resolved  - Off airborne precautions     # Pre-transplant ID w/u   - Trend ID recs for serologies   - Colonoscopy  - normal   - Chest CT  - improved LLL aeration  - s/p immunizations    ==================DERMATOLOGY================  # Upper Extremity Rash  - Patient developed bilateral upper extremity rash after receiving Hepatitis A & B immunizations on   - Dermatology consulted  - not likely to be related to vanco  - Recommend clobetasol 0.05% BID to affected areas   - RVP repeated to rule out viral etiology, negative      Patient requires continuous monitoring with bedside rhythm monitoring, pulse ox monitoring, and intermittent blood gas analysis. Care plan discussed with ICU care team. Patient remained critical and at risk for life threatening decompensation.  Patient seen, examined and plan discussed with CCU team during rounds.     I have personally provided ____ minutes of critical care time excluding time spent on separate procedures, in addition to initial critical care time provided by the CICU Attending, Dr. Her.    By signing my name below, I, Kalyn Sousa, attest that this documentation has been prepared under the direction and in the presence of Rita Hawthorne NP.   Electronically signed: Rosalba Booth, 23 @ 19:27    I, Rita Hawthorne , personally performed the services described in this documentation. all medical record entries made by the scribe were at my direction and in my presence. I have reviewed the chart and agree that the record reflects my personal performance and is accurate and complete  Electronically signed: Rita Hawthorne NP.        DEVORAH VALENCIA  MRN-[id number]  Patient is a 58y old  Male who presents with a chief complaint of Cardiogenic shock (17 Dec 2023 06:46)    HPI:  pt seen and approx 1:30 pm  in [facility]    59yo M w/ hx HTN, CAD w/ 1 stent in , ICH () presenting with abn ekg. Patient presented to [facility] where he was found to have STEMI, recommended to get cath however patient did not want to get it there so it left and came here.  Patient initially had cough, congestion, fever, was placed on antibiotics on .  Started feeling nauseous and had a presyncopal event after which he presented to ED last night.  Had chest pain as well.  Chest pain is midsternal.  Not currently having chest pain.  Received 4 aspirin 30 min pta. (2023 15:11)      Hospital Course:    24 HOUR EVENTS:    REVIEW OF SYSTEMS:    CONSTITUTIONAL: No weakness, fevers or chills  EYES/ENT: No visual changes;  No vertigo or throat pain   NECK: No pain or stiffness  RESPIRATORY: No cough, wheezing, hemoptysis; No shortness of breath  CARDIOVASCULAR: No chest pain or palpitations  GASTROINTESTINAL: No abdominal or epigastric pain. No nausea, vomiting, or hematemesis; No diarrhea or constipation. No melena or hematochezia.  GENITOURINARY: No dysuria, frequency or hematuria  NEUROLOGICAL: No numbness or weakness  SKIN: No itching, rashes      ICU Vital Signs Last 24 Hrs  T(C): 36.8 (17 Dec 2023 14:00), Max: 36.8 (17 Dec 2023 00:00)  T(F): 98.2 (17 Dec 2023 14:00), Max: 98.3 (17 Dec 2023 00:00)  HR: 75 (17 Dec 2023 19:00) (69 - 82)  BP: --  BP(mean): --  ABP: --  ABP(mean): --  RR: 16 (17 Dec 2023 19:00) (11 - 23)  SpO2: 95% (17 Dec 2023 09:00) (95% - 97%)    O2 Parameters below as of 17 Dec 2023 19:00  Patient On (Oxygen Delivery Method): room air            CVP(mm Hg): --  CO: --  CI: --  PA: --  PA(mean): --  PA(direct): --  PCWP: --  LA: --  RA: --  SVR: --  SVRI: --  PVR: --  PVRI: --  I&O's Summary    16 Dec 2023 07:01  -  17 Dec 2023 07:00  --------------------------------------------------------  IN: 1256 mL / OUT: 2000 mL / NET: -744 mL    17 Dec 2023 07:01  -  17 Dec 2023 19:27  --------------------------------------------------------  IN: 768 mL / OUT: 1050 mL / NET: -282 mL        CAPILLARY BLOOD GLUCOSE    CAPILLARY BLOOD GLUCOSE      POCT Blood Glucose.: 99 mg/dL (17 Dec 2023 17:16)      PHYSICAL EXAM:  GENERAL: No acute distress, well-developed  HEAD:  Atraumatic, Normocephalic  EYES: EOMI, PERRLA, conjunctiva and sclera clear  NECK: Supple, no lymphadenopathy, no JVD  CHEST/LUNG: CTAB; No wheezes, rales, or rhonchi  HEART: Regular rate and rhythm. Normal S1/S2. No murmurs, rubs, or gallops  ABDOMEN: Soft, non-tender, non-distended; normal bowel sounds, no organomegaly  EXTREMITIES:  2+ peripheral pulses b/l, No clubbing, cyanosis, or edema  NEUROLOGY: A&O x 3, no focal deficits  SKIN: No rashes or lesions    ============================I/O===========================   I&O's Detail    16 Dec 2023 07:01  -  17 Dec 2023 07:00  --------------------------------------------------------  IN:    Heparin: 336 mL    Oral Fluid: 920 mL  Total IN: 1256 mL    OUT:    Voided (mL): 2000 mL  Total OUT: 2000 mL    Total NET: -744 mL      17 Dec 2023 07:01  -  17 Dec 2023 19:27  --------------------------------------------------------  IN:    Heparin: 168 mL    Oral Fluid: 600 mL  Total IN: 768 mL    OUT:    Voided (mL): 1050 mL  Total OUT: 1050 mL    Total NET: -282 mL        ============================ LABS =========================                        11.7   7.53  )-----------( 179      ( 17 Dec 2023 01:14 )             35.5         134<L>  |  103  |  35<H>  ----------------------------<  138<H>  4.2   |  19<L>  |  1.16    Ca    9.7      17 Dec 2023 01:14  Phos  3.7       Mg     1.8         TPro  6.9  /  Alb  3.9  /  TBili  0.2  /  DBili  x   /  AST  23  /  ALT  84<H>  /  AlkPhos  61                  LIVER FUNCTIONS - ( 17 Dec 2023 01:14 )  Alb: 3.9 g/dL / Pro: 6.9 g/dL / ALK PHOS: 61 U/L / ALT: 84 U/L / AST: 23 U/L / GGT: x           PT/INR - ( 17 Dec 2023 01:14 )   PT: 11.5 sec;   INR: 1.10 ratio         PTT - ( 17 Dec 2023 01:14 )  PTT:71.7 sec    Lactate, Blood: 0.9 mmol/L (23 @ 01:14)    Urinalysis Basic - ( 17 Dec 2023 01:14 )    Color: x / Appearance: x / SG: x / pH: x  Gluc: 138 mg/dL / Ketone: x  / Bili: x / Urobili: x   Blood: x / Protein: x / Nitrite: x   Leuk Esterase: x / RBC: x / WBC x   Sq Epi: x / Non Sq Epi: x / Bacteria: x      ======================Micro/Rad/Cardio=================  Telemtry: Reviewed   EKG: Reviewed  CXR: Reviewed  Culture: Reviewed   Echo:   Cath: Cardiac Cath Lab - Adult:   [facility]  Department of Cardiology  [address] [place] [address]  [phone]  Cath Lab Report -- Comprehensive Report  Patient: LD VALENCIA  Study date: 2017  Account number: [id number]  MR number: [id number]  : 1965  Gender: Male  Race: W  Case Physician(s):  Jairon Holguin M.D.  Referring Physician:  Luc Lynn M.D.  INDICATIONS: Unstable angina - CCS4.  HISTORY: The patient has a history of coronary artery disease. The patient  hashypertension and medication-treated dyslipidemia.  PROCEDURE:  --  Left heart catheterization with ventriculography.  --  Left coronary angiography.  --  Right coronary angiography.  TECHNIQUE: The risks and alternatives of the procedures and conscious  sedation were explained to the patient and informed consent was obtained.  Cardiac catheterization performed electively.  Local anesthetic given. Right radial artery access. A 6FR PRELUDE KIT was  inserted in the vessel. Left heart catheterization. Ventriculography was  performed. A 5FR FR4.0 EXPO catheter was utilized. Left coronary artery  angiography. The vessel was injected utilizing a 5FR FL3.5 EXPO catheter.  Right coronary artery angiography. The vessel was injected utilizing a 5FR  FR4.0 EXPO catheter. RADIATION EXPOSURE: 1.1 min.  CONTRAST GIVEN: Omnipaque 55 ml.  MEDICATIONS GIVEN: Midazolam, 1 mg, IV. Fentanyl, 25 mcg, IV. Verapamil  (Isoptin, Calan, Covera), 2.5 mg, IA. Heparin, 3000 units, IA.  VENTRICLES: Global left ventricular function was moderately depressed. EF  estimated was 40 %.  CORONARY VESSELS: The coronary circulation is right dominant.  LM:   --  LM: Normal.  LAD:   --  Proximal LAD: There was a 50 % stenosis.  CX:   --  Circumflex: Normal.  RCA:   --  Mid RCA: There was a 40 % stenosis.  --  Distal RCA: There was a 50 % stenosis.  COMPLICATIONS: There were no complications.  DIAGNOSTIC RECOMMENDATIONS: The patient should continue with the present  medications.  Prepared and signed by  Jairon Holguin M.D.  Signed 2017 12:20:13  HEMODYNAMIC TABLES  Pressures:  Baseline/ Room Air  Pressures:  - HR: 78  Pressures:  - Rhythm:  Pressures:  -- Aortic Pressure (S/D/M): --/--/99  Pressures:  -- Left Ventricle (s/edp): 157/39/--  Outputs:  Baseline/ Room Air  Outputs:  -- CALCULATIONS[age]  Outputs:  -- CALCULATIONS: Body Surface Area: 2.05  Outputs:  -- CALCULATIONS: Height in cm: 175.00  Outputs:  -- CALCULATIONS: Sex: Male  Outputs:  -- CALCULATIONS: Weight in k.40 (17 @ 21:55)  ======================================================  PAST MEDICAL & SURGICAL HISTORY:  HTN (hypertension)      CAD (coronary artery disease)  ; stent      Intracranial hemorrhage        Respiratory arrest        Myocardial infarction, unspecified MI type, unspecified artery      History of coronary artery stent placement  ====================ASSESSMENT ==============  58 male with HTN, CAD (s/p PCI ), HFrEF, CVA , and T2DM presenting with chest pressure and unknown tachycardia that was shocked x1, [facility]  found to have in-stent restenosis of pLAD and  of RCA with elevated RA and PA pressures and severely decreased. Admitted to CICU for management of cardiogenic shock and ADHF requiring IABP  -, with hospital course c/b vfib arrest requiring reinsertion of IABP. Not recommended for ATs per HF. Currently listed for transplant status 2.    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 AT evaluation launched 11/10, he is ABO A. He was discussed during TSC on  and was approved for listing, however was found to be Bacteremic with  Blc Cx growing mixed cultures Staph epi and Enterococcus faecalis and started on IV Vanco. Repeat cultures from  reveal NGTD and therefore was cleared by ID for listing.     He appears adequately supported on IABP at 1:1, electrically quiescent on oral Amiodarone. Cr is improving with holding diuretics. Labs otherwise notable for worsening thrombocytopenia. Awaiting suitable donor. Now with GM + rods in blood culture on  (reported on ), restarted on vancomycin, and status 7, repeat cultures now negative x48 hours (), now status 2 again.       ====================== NEUROLOGY=====================  Anxiety  - No Seroquel/antipsychotics since vfib arrest and prolonged QTC  - Psych Eval, recommended SSRI, but pt. refused   - Psych recommending atarax PRN  - Continue to monitor mental status    PT/Conditioning  - Continue band exercises while on bedrest s/t IABP    ==================== RESPIRATORY======================  Acute Hypoxemic Respiratory Failure  - s/p x2 intubations for cardiogenic pulm edema and the in setting of cardiac arrest, resolved - extubated 11/10  - Currently maintaining >95% sats on room air  - Continue incentive spirometry and monitoring of sp02    Asthma  - c/w albuterol, symbicort and spiriva  - On trelegy at home  - Continue to monitor SpO2 with goal >94%    ====================CARDIOVASCULAR==================  Vfib arrest i/s/o ischemia  - Lido gtt off   - PO Amio load - total of 5g per EP complete   - Amio dc'd  for rising LFTs  - Keep K > 4, Mag > 2.2     Cardiogenic shock requiring IABP (- , -)  - Likely 2/2 NSTEMI and ADHF  -  LHC: pLAD 100 % in-stent restenosis & mRCA, 100 %. PCWP 30. IABP placed.  -  TTE: LV dilated. EF 32 %. Regional WMAs present, mod (grade 2) LV diastolic dysfunction  -  TTE: EF 22% and + LV thrombus  - IABP swapped  to RFA, continue 1:1 support - switched sensor to R radial a-line  due to poor sensing on groin line  - Off Milrinone gtt @ 7:30 am   - [place] d/c'd due to elevated K levels  - c/w coreg 25 BID for GDMT  - UNOS status 2 as of   - hydralazine 50 TID and ISD 30 TID for AL reduction    NSTEMI iso stent re-occlusion of pLAD and 100%  of RCA  - EKG on admission w/ LBBB  - DAPT: c/w ASA, Brilinta d/c'd per transplant w/u  - c/w lipitor 80  - cMR deferred given necessity of IABP  - CT sx not recommending CABG, undergoing AT eval    LV thrombus  - c/w heparin gtt w/ therapeutic PTT    ===================== RENAL =========================  Non-oliguric ARIC, resolved   - Baseline Cr: 1-  - Renal US: no evidence of renal artery stenosis  - Trend BMP, lytes daily, replace as needed  - Continue Strict I/Os, avoid nephrotoxins    Mild Hyponatremia/Hyperkalemia iso ARIC  - Continue fluid restriction   - Urea powder d/c'd per renal  - Trend daily  - Continue monitoring urine output, lytes, SCr/ BUN  - Replete lytes prn with goal K >4 and Mg >2    =============== GASTROINTESTINAL===================  Constipation/ileus, resolved  - regular diet  - bowel reg prn    ===================ENDO====================  Type 2 DM  - A1c 8.3   - Continue lantus, premeal, low ISS  - continue FS    ===================HEMATOLOGIC/ONC ===================  - H/H & plts stable  - Monitor H/H and plts  - VTE PPX: heparin gtt    ==================INFECTIOUS DISEASE================  # Positive blood culture, resolved  - Repeat BCx drawn  for leukocytosis to 12k - positive on , G+ rods in anaerobic bottle, suspect contaminant  - Repeat cultures x2 sent  per transplant ID recs - no growth to date  - Vancomycin by trough (-)  - Final microbial ID: Cutibacterium acnes, per ID this is likely to be a skin contaminant, vanc dc'd.   - Dental eval  to rule out infectious etiology that would have led to bacteremia, no significant findings on exam.     # Enterococcus faecalis bacteremia, resolved  - BCx + for enterococcus faecalis x2, Staph epi x1 (likely contaminant)- pan sensitive   - Urine cx  + enterococcus faecalis  - BCx  no growth   - IABP site swapped to RFA   - s/p Vancomycin 1g q12h (-)  - CT A/P negative for infectious pathology    # COVID, resolved  - Off airborne precautions     # Pre-transplant ID w/u   - Trend ID recs for serologies   - Colonoscopy  - normal   - Chest CT  - improved LLL aeration  - s/p immunizations    ==================DERMATOLOGY================  # Upper Extremity Rash  - Patient developed bilateral upper extremity rash after receiving Hepatitis A & B immunizations on   - Dermatology consulted  - not likely to be related to vanco  - Recommend clobetasol 0.05% BID to affected areas   - RVP repeated to rule out viral etiology, negative      Patient requires continuous monitoring with bedside rhythm monitoring, pulse ox monitoring, and intermittent blood gas analysis. Care plan discussed with ICU care team. Patient remained critical and at risk for life threatening decompensation.  Patient seen, examined and plan discussed with CCU team during rounds.     I have personally provided ____ minutes of critical care time excluding time spent on separate procedures, in addition to initial critical care time provided by the CICU Attending, Dr. Her.    By signing my name below, I, Kalyn Sousa, attest that this documentation has been prepared under the direction and in the presence of Rita Hawthorne NP.   Electronically signed: Rosalba Booth, 23 @ 19:27    I, Rita Hawthorne , personally performed the services described in this documentation. all medical record entries made by the scribe were at my direction and in my presence. I have reviewed the chart and agree that the record reflects my personal performance and is accurate and complete  Electronically signed: Rita Hawthorne NP.        DEVORAH VALENCIA  MRN-[id number]  Patient is a 58y old  Male who presents with a chief complaint of Cardiogenic shock (17 Dec 2023 06:46)    HPI: 58M w/ hx HTN, CAD w/ 1 stent in , ICH () presenting with abn ekg. Patient presented to [facility] where he was found to have STEMI, recommended to get cath however patient did not want to get it there so it left and came here.  Patient initially had cough, congestion, fever, was placed on antibiotics on .  Started feeling nauseous and had a presyncopal event after which he presented to ED last night.  Had chest pain as well.  Chest pain is midsternal.  Not currently having chest pain.  Received 4 aspirin 30 min pta. (2023 15:11)    REVIEW OF SYSTEMS:  CONSTITUTIONAL: No weakness, fevers or chills  EYES/ENT: No visual changes;  No vertigo or throat pain   NECK: No pain or stiffness  RESPIRATORY: No cough, wheezing, hemoptysis; No shortness of breath  CARDIOVASCULAR: No chest pain or palpitations  GASTROINTESTINAL: No abdominal or epigastric pain. No nausea, vomiting, or hematemesis; No diarrhea or constipation. No melena or hematochezia.  GENITOURINARY: No dysuria, frequency or hematuria  NEUROLOGICAL: No numbness or weakness  SKIN: No itching, rashes    ICU Vital Signs Last 24 Hrs  T(C): 36.8 (17 Dec 2023 14:00), Max: 36.8 (17 Dec 2023 00:00)  T(F): 98.2 (17 Dec 2023 14:00), Max: 98.3 (17 Dec 2023 00:00)  HR: 75 (17 Dec 2023 19:00) (69 - 82)  RR: 16 (17 Dec 2023 19:00) (11 - 23)  SpO2: 95% (17 Dec 2023 09:00) (95% - 97%)    O2 Parameters below as of 17 Dec 2023 19:00  Patient On (Oxygen Delivery Method): room air      I&O's Summary    16 Dec 2023 07:01  -  17 Dec 2023 07:00  --------------------------------------------------------  IN: 1256 mL / OUT: 2000 mL / NET: -744 mL    17 Dec 2023 07:01  -  17 Dec 2023 19:27  --------------------------------------------------------  IN: 768 mL / OUT: 1050 mL / NET: -282 mL      CAPILLARY BLOOD GLUCOSE  POCT Blood Glucose.: 99 mg/dL (17 Dec 2023 17:16)  ============================I/O===========================   I&O's Detail    16 Dec 2023 07:01  -  17 Dec 2023 07:00  --------------------------------------------------------  IN:    Heparin: 336 mL    Oral Fluid: 920 mL  Total IN: 1256 mL    OUT:    Voided (mL): 2000 mL  Total OUT: 2000 mL    Total NET: -744 mL      17 Dec 2023 07:01  -  17 Dec 2023 19:27  --------------------------------------------------------  IN:    Heparin: 168 mL    Oral Fluid: 600 mL  Total IN: 768 mL    OUT:    Voided (mL): 1050 mL  Total OUT: 1050 mL    Total NET: -282 mL  ============================ LABS =========================                      11.7   7.53  )-----------( 179      ( 17 Dec 2023 01:14 )             35.5     -    134<L>  |  103  |  35<H>  ----------------------------<  138<H>  4.2   |  19<L>  |  1.16    Ca    9.7      17 Dec 2023 01:14  Phos  3.7       Mg     1.8         TPro  6.9  /  Alb  3.9  /  TBili  0.2  /  DBili  x   /  AST  23  /  ALT  84<H>  /  AlkPhos  61      LIVER FUNCTIONS - ( 17 Dec 2023 01:14 )  Alb: 3.9 g/dL / Pro: 6.9 g/dL / ALK PHOS: 61 U/L / ALT: 84 U/L / AST: 23 U/L / GGT: x           PT/INR - ( 17 Dec 2023 01:14 )   PT: 11.5 sec;   INR: 1.10 ratio    PTT - ( 17 Dec 2023 01:14 )  PTT:71.7 sec    Lactate, Blood: 0.9 mmol/L (23 @ 01:14)    Urinalysis Basic - ( 17 Dec 2023 01:14 )    Color: x / Appearance: x / SG: x / pH: x  Gluc: 138 mg/dL / Ketone: x  / Bili: x / Urobili: x   Blood: x / Protein: x / Nitrite: x   Leuk Esterase: x / RBC: x / WBC x   Sq Epi: x / Non Sq Epi: x / Bacteria: x  ======================Micro/Rad/Cardio=================  Telemtry: Reviewed   EKG: Reviewed  CXR: Reviewed  Culture: Reviewed   Echo:   Cath: Cardiac Cath Lab - Adult:   [facility]  Department of Cardiology  [address] [place] [address]  [phone]  Cath Lab Report -- Comprehensive Report  Patient: LD VALENCIA  Study date: 2017  Account number: [id number]  MR number: [id number]  : 1965  Gender: Male  Race: W  Case Physician(s):  Jairon Holguin M.D.  Referring Physician:  Luc Lynn M.D.  INDICATIONS: Unstable angina - CCS4.  HISTORY: The patient has a history of coronary artery disease. The patient  hashypertension and medication-treated dyslipidemia.  PROCEDURE:  --  Left heart catheterization with ventriculography.  --  Left coronary angiography.  --  Right coronary angiography.  TECHNIQUE: The risks and alternatives of the procedures and conscious  sedation were explained to the patient and informed consent was obtained.  Cardiac catheterization performed electively.  Local anesthetic given. Right radial artery access. A 6FR PRELUDE KIT was  inserted in the vessel. Left heart catheterization. Ventriculography was  performed. A 5FR FR4.0 EXPO catheter was utilized. Left coronary artery  angiography. The vessel was injected utilizing a 5FR FL3.5 EXPO catheter.  Right coronary artery angiography. The vessel was injected utilizing a 5FR  FR4.0 EXPO catheter. RADIATION EXPOSURE: 1.1 min.  CONTRAST GIVEN: Omnipaque 55 ml.  MEDICATIONS GIVEN: Midazolam, 1 mg, IV. Fentanyl, 25 mcg, IV. Verapamil  (Isoptin, Calan, Covera), 2.5 mg, IA. Heparin, 3000 units, IA.  VENTRICLES: Global left ventricular function was moderately depressed. EF  estimated was 40 %.  CORONARY VESSELS: The coronary circulation is right dominant.  LM:   --  LM: Normal.  LAD:   --  Proximal LAD: There was a 50 % stenosis.  CX:   --  Circumflex: Normal.  RCA:   --  Mid RCA: There was a 40 % stenosis.  --  Distal RCA: There was a 50 % stenosis.  COMPLICATIONS: There were no complications.  DIAGNOSTIC RECOMMENDATIONS: The patient should continue with the present  medications.  Prepared and signed by  Jairon Holguin M.D.  Signed 2017 12:20:13  HEMODYNAMIC TABLES  Pressures:  Baseline/ Room Air  Pressures:  - HR: 78  Pressures:  - Rhythm:  Pressures:  -- Aortic Pressure (S/D/M): --/--/99  Pressures:  -- Left Ventricle (s/edp): 157/39/--  Outputs:  Baseline/ Room Air  Outputs:  -- CALCULATIONS[age]  Outputs:  -- CALCULATIONS: Body Surface Area: 2.05  Outputs:  -- CALCULATIONS: Height in cm: 175.00  Outputs:  -- CALCULATIONS: Sex: Male  Outputs:  -- CALCULATIONS: Weight in k.40 (01-27-17 @ 21:55)  ======================================================  PAST MEDICAL & SURGICAL HISTORY:  HTN (hypertension)      CAD (coronary artery disease)  ; stent      Intracranial hemorrhage        Respiratory arrest        Myocardial infarction, unspecified MI type, unspecified artery      History of coronary artery stent placement    ====================ASSESSMENT ==============  58 male with HTN, CAD (s/p PCI ), HFrEF, CVA , and T2DM presenting with chest pressure and unknown tachycardia that was shocked x1, [facility]  found to have in-stent restenosis of pLAD and  of RCA with elevated RA and PA pressures and severely decreased. Admitted to CICU for management of cardiogenic shock and ADHF requiring IABP  -, with hospital course c/b vfib arrest requiring reinsertion of IABP. Not recommended for ATs per HF. Currently listed for transplant status 2.    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 AT evaluation launched 11/10, he is ABO A. He was discussed during TSC on  and was approved for listing, however was found to be Bacteremic with  Blc Cx growing mixed cultures Staph epi and Enterococcus faecalis and started on IV Vanco. Repeat cultures from  reveal NGTD and therefore was cleared by ID for listing.     He appears adequately supported on IABP at 1:1, electrically quiescent on oral Amiodarone. Cr is improving with holding diuretics. Labs otherwise notable for worsening thrombocytopenia. Awaiting suitable donor. Now with GM + rods in blood culture on  (reported on ), restarted on vancomycin, and status 7, repeat cultures now negative x48 hours (), now status 2 again.       ====================== NEUROLOGY=====================  Anxiety  - No Seroquel/antipsychotics since vfib arrest and prolonged QTC  - Psych Eval, recommended SSRI, but pt. refused   - Psych recommending atarax PRN  - Continue to monitor mental status    PT/Conditioning  - Continue band exercises while on bedrest s/t IABP    ==================== RESPIRATORY======================  Acute Hypoxemic Respiratory Failure  - s/p x2 intubations for cardiogenic pulm edema and the in setting of cardiac arrest, resolved - extubated 11/10  - Currently maintaining >95% sats on room air  - Continue incentive spirometry and monitoring of sp02    Asthma  - c/w albuterol, symbicort and spiriva  - On trelegy at home  - Continue to monitor SpO2 with goal >94%    ====================CARDIOVASCULAR==================  Vfib arrest i/s/o ischemia  - [place] gtt off   - PO Amio load - total of 5g per EP complete   - Amio dc'd  for rising LFTs  - Keep K > 4, Mag > 2.2     Cardiogenic shock requiring IABP (- , -)  - Likely 2/2 NSTEMI and ADHF  -  LHC: pLAD 100 % in-stent restenosis & mRCA, 100 %. PCWP 30. IABP placed.  -  TTE: LV dilated. EF 32 %. Regional WMAs present, mod (grade 2) LV diastolic dysfunction  -  TTE: EF 22% and + LV thrombus  - IABP swapped  to RFA, continue 1:1 support - switched sensor to R radial a-line  due to poor sensing on groin line  - Off Milrinone gtt @ 7:30 am   - [place] d/c'd due to elevated K levels  - c/w coreg 25 BID for GDMT  - UNOS status 2 as of   - hydralazine 50 TID and ISD 30 TID for AL reduction    NSTEMI iso stent re-occlusion of pLAD and 100%  of RCA  - EKG on admission w/ LBBB  - DAPT: c/w ASA, Brilinta d/c'd per transplant w/u  - c/w lipitor 80  - cMR deferred given necessity of IABP  - CT sx not recommending CABG, undergoing AT eval    LV thrombus  - c/w heparin gtt w/ therapeutic PTT    ===================== RENAL =========================  Non-oliguric ARIC, resolved   - Baseline Cr: -  - Renal US: no evidence of renal artery stenosis  - Trend BMP, lytes daily, replace as needed  - Continue Strict I/Os, avoid nephrotoxins    Mild Hyponatremia/Hyperkalemia iso ARIC  - Continue fluid restriction   - Urea powder d/c'd per renal  - Trend daily  - Continue monitoring urine output, lytes, SCr/ BUN  - Replete lytes prn with goal K >4 and Mg >2    =============== GASTROINTESTINAL===================  Constipation/ileus, resolved  - regular diet  - bowel reg prn    ===================ENDO====================  Type 2 DM  - A1c 8.3   - Continue lantus, premeal, low ISS  - continue FS    ===================HEMATOLOGIC/ONC ===================  - H/H & plts stable  - Monitor H/H and plts  - VTE PPX: heparin gtt    ==================INFECTIOUS DISEASE================  # Positive blood culture, resolved  - Repeat BCx drawn  for leukocytosis to 12k - positive on , G+ rods in anaerobic bottle, suspect contaminant  - Repeat cultures x2 sent  per transplant ID recs - no growth to date  - Vancomycin by trough (-)  - Final microbial ID: Cutibacterium acnes, per ID this is likely to be a skin contaminant, vanc dc'd.   - Dental eval  to rule out infectious etiology that would have led to bacteremia, no significant findings on exam.     # Enterococcus faecalis bacteremia, resolved  - BCx + for enterococcus faecalis x2, Staph epi x1 (likely contaminant)- pan sensitive   - Urine cx  + enterococcus faecalis  - BCx  no growth   - IABP site swapped to RFA   - s/p Vancomycin 1g q12h (-)  - CT A/P negative for infectious pathology    # COVID, resolved  - Off airborne precautions     # Pre-transplant ID w/u   - Trend ID recs for serologies   - Colonoscopy  - normal   - Chest CT  - improved LLL aeration  - s/p immunizations    ==================DERMATOLOGY================  # Upper Extremity Rash  - Patient developed bilateral upper extremity rash after receiving Hepatitis A & B immunizations on   - Dermatology consulted  - not likely to be related to vanco  - Recommend clobetasol 0.05% BID to affected areas   - RVP repeated to rule out viral etiology, negative      Patient requires continuous monitoring with bedside rhythm monitoring, pulse ox monitoring, and intermittent blood gas analysis. Care plan discussed with ICU care team. Patient remained critical and at risk for life threatening decompensation.  Patient seen, examined and plan discussed with CCU team during rounds.     I have personally provided 35 minutes of critical care time excluding time spent on separate procedures, in addition to initial critical care time provided by the CICU Attending, Dr. Her.    By signing my name below, I, Kalyn Sousa, attest that this documentation has been prepared under the direction and in the presence of Rita Hawthorne NP.   Electronically signed: Rosalba Booth, 23 @ 19:27    I, Rita Hawthorne , personally performed the services described in this documentation. all medical record entries made by the scribe were at my direction and in my presence. I have reviewed the chart and agree that the record reflects my personal performance and is accurate and complete  Electronically signed: Rita Hawthorne NP     DEVORAH VALENCIA  MRN-[id number]  Patient is a 58y old  Male who presents with a chief complaint of Cardiogenic shock (17 Dec 2023 06:46)    HPI: 58M w/ hx HTN, CAD w/ 1 stent in , ICH () presenting with abn ekg. Patient presented to [facility] where he was found to have STEMI, recommended to get cath however patient did not want to get it there so it left and came here.  Patient initially had cough, congestion, fever, was placed on antibiotics on .  Started feeling nauseous and had a presyncopal event after which he presented to ED last night.  Had chest pain as well.  Chest pain is midsternal.  Not currently having chest pain.  Received 4 aspirin 30 min pta. (2023 15:11)    REVIEW OF SYSTEMS:  CONSTITUTIONAL: No weakness, fevers or chills  EYES/ENT: No visual changes;  No vertigo or throat pain   NECK: No pain or stiffness  RESPIRATORY: No cough, wheezing, hemoptysis; No shortness of breath  CARDIOVASCULAR: No chest pain or palpitations  GASTROINTESTINAL: No abdominal or epigastric pain. No nausea, vomiting, or hematemesis; No diarrhea or constipation. No melena or hematochezia.  GENITOURINARY: No dysuria, frequency or hematuria  NEUROLOGICAL: No numbness or weakness  SKIN: No itching, rashes    ICU Vital Signs Last 24 Hrs  T(C): 36.8 (17 Dec 2023 14:00), Max: 36.8 (17 Dec 2023 00:00)  T(F): 98.2 (17 Dec 2023 14:00), Max: 98.3 (17 Dec 2023 00:00)  HR: 75 (17 Dec 2023 19:00) (69 - 82)  RR: 16 (17 Dec 2023 19:00) (11 - 23)  SpO2: 95% (17 Dec 2023 09:00) (95% - 97%)    O2 Parameters below as of 17 Dec 2023 19:00  Patient On (Oxygen Delivery Method): room air      I&O's Summary    16 Dec 2023 07:01  -  17 Dec 2023 07:00  --------------------------------------------------------  IN: 1256 mL / OUT: 2000 mL / NET: -744 mL    17 Dec 2023 07:01  -  17 Dec 2023 19:27  --------------------------------------------------------  IN: 768 mL / OUT: 1050 mL / NET: -282 mL      CAPILLARY BLOOD GLUCOSE  POCT Blood Glucose.: 99 mg/dL (17 Dec 2023 17:16)  ============================I/O===========================   I&O's Detail    16 Dec 2023 07:01  -  17 Dec 2023 07:00  --------------------------------------------------------  IN:    Heparin: 336 mL    Oral Fluid: 920 mL  Total IN: 1256 mL    OUT:    Voided (mL): 2000 mL  Total OUT: 2000 mL    Total NET: -744 mL      17 Dec 2023 07:01  -  17 Dec 2023 19:27  --------------------------------------------------------  IN:    Heparin: 168 mL    Oral Fluid: 600 mL  Total IN: 768 mL    OUT:    Voided (mL): 1050 mL  Total OUT: 1050 mL    Total NET: -282 mL  ============================ LABS =========================                      11.7   7.53  )-----------( 179      ( 17 Dec 2023 01:14 )             35.5     -    134<L>  |  103  |  35<H>  ----------------------------<  138<H>  4.2   |  19<L>  |  1.16    Ca    9.7      17 Dec 2023 01:14  Phos  3.7       Mg     1.8         TPro  6.9  /  Alb  3.9  /  TBili  0.2  /  DBili  x   /  AST  23  /  ALT  84<H>  /  AlkPhos  61      LIVER FUNCTIONS - ( 17 Dec 2023 01:14 )  Alb: 3.9 g/dL / Pro: 6.9 g/dL / ALK PHOS: 61 U/L / ALT: 84 U/L / AST: 23 U/L / GGT: x           PT/INR - ( 17 Dec 2023 01:14 )   PT: 11.5 sec;   INR: 1.10 ratio    PTT - ( 17 Dec 2023 01:14 )  PTT:71.7 sec    Lactate, Blood: 0.9 mmol/L (23 @ 01:14)    Urinalysis Basic - ( 17 Dec 2023 01:14 )    Color: x / Appearance: x / SG: x / pH: x  Gluc: 138 mg/dL / Ketone: x  / Bili: x / Urobili: x   Blood: x / Protein: x / Nitrite: x   Leuk Esterase: x / RBC: x / WBC x   Sq Epi: x / Non Sq Epi: x / Bacteria: x  ======================Micro/Rad/Cardio=================  Telemtry: Reviewed   EKG: Reviewed  CXR: Reviewed  Culture: Reviewed   Echo:   Cath: Cardiac Cath Lab - Adult:   [facility]  Department of Cardiology  [address] [place] [address]  [phone]  Cath Lab Report -- Comprehensive Report  Patient: LD VALENCIA  Study date: 2017  Account number: [id number]  MR number: [id number]  : 1965  Gender: Male  Race: W  Case Physician(s):  Jairon Holguin M.D.  Referring Physician:  Luc Lynn M.D.  INDICATIONS: Unstable angina - CCS4.  HISTORY: The patient has a history of coronary artery disease. The patient  hashypertension and medication-treated dyslipidemia.  PROCEDURE:  --  Left heart catheterization with ventriculography.  --  Left coronary angiography.  --  Right coronary angiography.  TECHNIQUE: The risks and alternatives of the procedures and conscious  sedation were explained to the patient and informed consent was obtained.  Cardiac catheterization performed electively.  Local anesthetic given. Right radial artery access. A 6FR PRELUDE KIT was  inserted in the vessel. Left heart catheterization. Ventriculography was  performed. A 5FR FR4.0 EXPO catheter was utilized. Left coronary artery  angiography. The vessel was injected utilizing a 5FR FL3.5 EXPO catheter.  Right coronary artery angiography. The vessel was injected utilizing a 5FR  FR4.0 EXPO catheter. RADIATION EXPOSURE: 1.1 min.  CONTRAST GIVEN: Omnipaque 55 ml.  MEDICATIONS GIVEN: Midazolam, 1 mg, IV. Fentanyl, 25 mcg, IV. Verapamil  (Isoptin, Calan, Covera), 2.5 mg, IA. Heparin, 3000 units, IA.  VENTRICLES: Global left ventricular function was moderately depressed. EF  estimated was 40 %.  CORONARY VESSELS: The coronary circulation is right dominant.  LM:   --  LM: Normal.  LAD:   --  Proximal LAD: There was a 50 % stenosis.  CX:   --  Circumflex: Normal.  RCA:   --  Mid RCA: There was a 40 % stenosis.  --  Distal RCA: There was a 50 % stenosis.  COMPLICATIONS: There were no complications.  DIAGNOSTIC RECOMMENDATIONS: The patient should continue with the present  medications.  Prepared and signed by  Jairon Holguin M.D.  Signed 2017 12:20:13  HEMODYNAMIC TABLES  Pressures:  Baseline/ Room Air  Pressures:  - HR: 78  Pressures:  - Rhythm:  Pressures:  -- Aortic Pressure (S/D/M): --/--/99  Pressures:  -- Left Ventricle (s/edp): 157/39/--  Outputs:  Baseline/ Room Air  Outputs:  -- CALCULATIONS[age]  Outputs:  -- CALCULATIONS: Body Surface Area: 2.05  Outputs:  -- CALCULATIONS: Height in cm: 175.00  Outputs:  -- CALCULATIONS: Sex: Male  Outputs:  -- CALCULATIONS: Weight in k.40 (01-27-17 @ 21:55)  ======================================================  PAST MEDICAL & SURGICAL HISTORY:  HTN (hypertension)      CAD (coronary artery disease)  ; stent      Intracranial hemorrhage        Respiratory arrest        Myocardial infarction, unspecified MI type, unspecified artery      History of coronary artery stent placement    ====================ASSESSMENT ==============  58 male with HTN, CAD (s/p PCI ), HFrEF, CVA , and T2DM presenting with chest pressure and unknown tachycardia that was shocked x1, [facility]  found to have in-stent restenosis of pLAD and  of RCA with elevated RA and PA pressures and severely decreased. Admitted to CICU for management of cardiogenic shock and ADHF requiring IABP  -, with hospital course c/b vfib arrest requiring reinsertion of IABP. Not recommended for ATs per HF. Currently listed for transplant status 2.    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 AT evaluation launched 11/10, he is ABO A. He was discussed during TSC on  and was approved for listing, however was found to be Bacteremic with  Blc Cx growing mixed cultures Staph epi and Enterococcus faecalis and started on IV Vanco. Repeat cultures from  reveal NGTD and therefore was cleared by ID for listing.     He appears adequately supported on IABP at 1:1, electrically quiescent on oral Amiodarone. Cr is improving with holding diuretics. Labs otherwise notable for worsening thrombocytopenia. Awaiting suitable donor. Now with GM + rods in blood culture on  (reported on ), restarted on vancomycin, and status 7, repeat cultures now negative x48 hours (), now status 2 again.       ====================== NEUROLOGY=====================  Anxiety  - No Seroquel/antipsychotics since vfib arrest and prolonged QTC  - Psych Eval, recommended SSRI, but pt. refused   - Psych recommending atarax PRN  - Continue to monitor mental status    PT/Conditioning  - Continue band exercises while on bedrest s/t IABP    ==================== RESPIRATORY======================  Acute Hypoxemic Respiratory Failure  - s/p x2 intubations for cardiogenic pulm edema and the in setting of cardiac arrest, resolved - extubated 11/10  - Currently maintaining >95% sats on room air  - Continue incentive spirometry and monitoring of sp02    Asthma  - c/w albuterol, symbicort and spiriva  - On trelegy at home  - Continue to monitor SpO2 with goal >94%    ====================CARDIOVASCULAR==================  Vfib arrest i/s/o ischemia  - Lido gtt off   - PO Amio load - total of 5g per EP complete   - Amio dc'd  for rising LFTs  - Keep K > 4, Mag > 2.2     Cardiogenic shock requiring IABP (- , -)  - Likely 2/2 NSTEMI and ADHF  -  LHC: pLAD 100 % in-stent restenosis & mRCA, 100 %. PCWP 30. IABP placed.  -  TTE: LV dilated. EF 32 %. Regional WMAs present, mod (grade 2) LV diastolic dysfunction  -  TTE: EF 22% and + LV thrombus  - IABP swapped  to RFA, continue 1:1 support - switched sensor to R radial a-line  due to poor sensing on groin line  - Off Milrinone gtt @ 7:30 am   - [place] d/c'd due to elevated K levels  - c/w coreg 25 BID for GDMT  - UNOS status 2 as of   - hydralazine 50 TID and ISD 30 TID for AL reduction    NSTEMI iso stent re-occlusion of pLAD and 100%  of RCA  - EKG on admission w/ LBBB  - DAPT: c/w ASA, Brilinta d/c'd per transplant w/u  - c/w lipitor 80  - cMR deferred given necessity of IABP  - CT sx not recommending CABG, undergoing AT eval    LV thrombus  - c/w heparin gtt w/ therapeutic PTT    ===================== RENAL =========================  Non-oliguric ARIC, resolved   - Baseline Cr: -  - Renal US: no evidence of renal artery stenosis  - Trend BMP, lytes daily, replace as needed  - Continue Strict I/Os, avoid nephrotoxins    Mild Hyponatremia/Hyperkalemia iso ARIC  - Continue fluid restriction   - Urea powder d/c'd per renal  - Trend daily  - Continue monitoring urine output, lytes, SCr/ BUN  - Replete lytes prn with goal K >4 and Mg >2    =============== GASTROINTESTINAL===================  Constipation/ileus, resolved  - regular diet  - bowel reg prn    ===================ENDO====================  Type 2 DM  - A1c 8.3   - Continue lantus, premeal, low ISS  - continue FS    ===================HEMATOLOGIC/ONC ===================  - H/H & plts stable  - Monitor H/H and plts  - VTE PPX: heparin gtt    ==================INFECTIOUS DISEASE================  # Positive blood culture, resolved  - Repeat BCx drawn  for leukocytosis to 12k - positive on , G+ rods in anaerobic bottle, suspect contaminant  - Repeat cultures x2 sent  per transplant ID recs - no growth to date  - Vancomycin by trough (-)  - Final microbial ID: Cutibacterium acnes, per ID this is likely to be a skin contaminant, vanc dc'd.   - Dental eval  to rule out infectious etiology that would have led to bacteremia, no significant findings on exam.     # Enterococcus faecalis bacteremia, resolved  - BCx + for enterococcus faecalis x2, Staph epi x1 (likely contaminant)- pan sensitive   - Urine cx  + enterococcus faecalis  - BCx  no growth   - IABP site swapped to RFA   - s/p Vancomycin 1g q12h (-)  - CT A/P negative for infectious pathology    # COVID, resolved  - Off airborne precautions     # Pre-transplant ID w/u   - Trend ID recs for serologies   - Colonoscopy  - normal   - Chest CT  - improved LLL aeration  - s/p immunizations    ==================DERMATOLOGY================  # Upper Extremity Rash  - Patient developed bilateral upper extremity rash after receiving Hepatitis A & B immunizations on   - Dermatology consulted  - not likely to be related to vanco  - Recommend clobetasol 0.05% BID to affected areas   - RVP repeated to rule out viral etiology, negative      Patient requires continuous monitoring with bedside rhythm monitoring, pulse ox monitoring, and intermittent blood gas analysis. Care plan discussed with ICU care team. Patient remained critical and at risk for life threatening decompensation.  Patient seen, examined and plan discussed with CCU team during rounds.     I have personally provided 35 minutes of critical care time excluding time spent on separate procedures, in addition to initial critical care time provided by the CICU Attending, Dr. Her.    By signing my name below, I, Kalyn Sousa, attest that this documentation has been prepared under the direction and in the presence of Rita Hawthorne NP.   Electronically signed: Rosalba Booth, 23 @ 19:27    I, Rita Hatwhorne , personally performed the services described in this documentation. all medical record entries made by the scribe were at my direction and in my presence. I have reviewed the chart and agree that the record reflects my personal performance and is accurate and complete  Electronically signed: Rita Hawthorne NP

## 2023-12-17 NOTE — PROGRESS NOTE ADULT - SUBJECTIVE AND OBJECTIVE BOX
CICU DAY NOTE  Admission date: 11/1/23  Chief complaint/ Diagnosis: CS  HPI: 57yo M w/ hx HTN, CAD w/ 1 stent in 2009, ICH (2008) presenting with abn ekg. Patient presented to [facility] where he was found to have STEMI, recommended to get cath however patient did not want to get it there so it left and came here.  Patient initially had cough, congestion, fever, was placed on antibiotics on Sunday.  Started feeling nauseous and had a presyncopal event after which he presented to ED last night.  Had chest pain as well.  Chest pain is midsternal.  Not currently having chest pain.  Received 4 aspirin 30 min pta. (01 Nov 2023 15:11)    Interval history: Uneventful overnight    REVIEW OF SYSTEMS  Denies CP, Palpitation, SOB, Dyspnea [x ] All other systems negative    MEDICATIONS  (STANDING)  aspirin  chewable 81 milliGRAM(s) Oral daily  atorvastatin 80 milliGRAM(s) Oral at bedtime  budesonide  80 MICROgram(s)/formoterol 4.5 MICROgram(s) Inhaler 2 Puff(s) Inhalation two times a day  carvedilol 25 milliGRAM(s) Oral every 12 hours  chlorhexidine 2% Cloths 1 Application(s) Topical daily  heparin  Infusion 1300 Unit(s)/Hr (14 mL/Hr) IV Continuous <Continuous>  hydrALAZINE 50 milliGRAM(s) Oral every 8 hours  insulin glargine Injectable (LANTUS) 12 Unit(s) SubCutaneous at bedtime  insulin lispro (ADMELOG) corrective regimen sliding scale   SubCutaneous at bedtime  insulin lispro (ADMELOG) corrective regimen sliding scale   SubCutaneous three times a day before meals  insulin lispro Injectable (ADMELOG) 9 Unit(s) SubCutaneous three times a day before meals  isosorbide   dinitrate Tablet (ISORDIL) 30 milliGRAM(s) Oral three times a day  polyethylene glycol 3350 17 Gram(s) Oral two times a day  senna 2 Tablet(s) Oral at bedtime    MEDICATIONS  (PRN):  hydrOXYzine hydrochloride 25 milliGRAM(s) Oral two times a day PRN Anxiety    PAST MEDICAL & SURGICAL HISTORY:  HTN (hypertension)  CAD (coronary artery disease) 2009; stent  Intracranial hemorrhage 2008  Respiratory arrest december 1st  Myocardial infarction, unspecified MI type, unspecified artery  History of coronary artery stent placement    FAMILY HISTORY:  Family history of meningitis (Sibling) Brother, death at age 49 from complications of infection (June 2015)  Family history of hypertension in mother Mother    Allergy  penicillins (Unknown)    ICU Vital Signs Last 24 Hrs  T(C): 36.7 (Max: 37.1)  HR: 75 (71 - 91)  BP: 94/48  (94/48 - 94/48)  BP(mean): 63  (63 - 63)  Aug   RR: 23 (11 - 26)  SpO2: 97%  (94% - 97%) on room air     I&O's Summary  IN: 1242 mL / OUT: 2000 mL / NET: -758 mL    PHYSICAL EXAM  Appearance: Normal, NAD  HEAD:   Normocephalic  EYES:PERRLA, conjunctiva and sclera clear  NECK: Supple, No JVD  CHEST/LUNG: Clear to auscultation bilaterally; No wheezing  HEART: Normal S1, S2. No murmurs, rubs, or gallops  ABDOMEN: + Bowel sounds, Soft, NT, ND   EXTREMITIES:  2+ Peripheral Pulses, No clubbing, cyanosis, or edema  NEUROLOGY: non-focal, aaox3  SKIN: No rashes or lesions    Interpretation of Telemetry:                        11.7   7.53  )-----------( 179                 35.5       134<L>  |  103  |  35<H>  ----------------------------<  138<H>  4.2   |  19<L>  |  1.16    Ca    9.7     Phos  3.7     Mg     1.8(repleted)  TPro  6.9  /  Alb  3.9  /  TBili  0.2  /  DBili  x   /  AST  23  /  ALT  84<H>  /  AlkPhos  61   F/S 134-180             CICU DAY NOTE  Admission date: 11/1/23  Chief complaint/ Diagnosis: CS  HPI: 57yo M w/ hx HTN, CAD w/ 1 stent in 2009, ICH (2008) presenting with abn ekg. Patient presented to [facility] where he was found to have STEMI, recommended to get cath however patient did not want to get it there so it left and came here.  Patient initially had cough, congestion, fever, was placed on antibiotics on Sunday.  Started feeling nauseous and had a presyncopal event after which he presented to ED last night.  Had chest pain as well.  Chest pain is midsternal.  Not currently having chest pain.  Received 4 aspirin 30 min pta. (01 Nov 2023 15:11)    Interval history: Uneventful overnight    REVIEW OF SYSTEMS  Denies CP, Palpitation, SOB, Dyspnea [x ] All other systems negative    MEDICATIONS  (STANDING)  aspirin  chewable 81 milliGRAM(s) Oral daily  atorvastatin 80 milliGRAM(s) Oral at bedtime  budesonide  80 MICROgram(s)/formoterol 4.5 MICROgram(s) Inhaler 2 Puff(s) Inhalation two times a day  carvedilol 25 milliGRAM(s) Oral every 12 hours  chlorhexidine 2% Cloths 1 Application(s) Topical daily  heparin  Infusion 1300 Unit(s)/Hr (14 mL/Hr) IV Continuous <Continuous>  hydrALAZINE 50 milliGRAM(s) Oral every 8 hours  insulin glargine Injectable (LANTUS) 12 Unit(s) SubCutaneous at bedtime  insulin lispro (ADMELOG) corrective regimen sliding scale   SubCutaneous at bedtime  insulin lispro (ADMELOG) corrective regimen sliding scale   SubCutaneous three times a day before meals  insulin lispro Injectable (ADMELOG) 9 Unit(s) SubCutaneous three times a day before meals  isosorbide   dinitrate Tablet (ISORDIL) 30 milliGRAM(s) Oral three times a day  polyethylene glycol 3350 17 Gram(s) Oral two times a day  senna 2 Tablet(s) Oral at bedtime    MEDICATIONS  (PRN):  hydrOXYzine hydrochloride 25 milliGRAM(s) Oral two times a day PRN Anxiety    PAST MEDICAL & SURGICAL HISTORY:  HTN (hypertension)  CAD (coronary artery disease) 2009; stent  Intracranial hemorrhage 2008  Respiratory arrest december 1st  Myocardial infarction, unspecified MI type, unspecified artery  History of coronary artery stent placement    FAMILY HISTORY:  Family history of meningitis (Sibling) Brother, death at age 49 from complications of infection (June 2015)  Family history of hypertension in mother Mother    Allergy  penicillins (Unknown)    ICU Vital Signs Last 24 Hrs  T(C): 36.7 (Max: 37.1)  HR: 75 (71 - 91)  BP: 94/48  (94/48 - 94/48)  BP(mean): 63  (63 - 63)  Aug   RR: 23 (11 - 26)  SpO2: 97%  (94% - 97%) on room air     I&O's Summary  IN: 1242 mL / OUT: 2000 mL / NET: -758 mL    PHYSICAL EXAM  Appearance: Normal, NAD  HEAD:   Normocephalic  EYES:PERRLA, conjunctiva and sclera clear  NECK: Supple, No JVD  CHEST/LUNG: Clear to auscultation bilaterally; No wheezing  HEART: Normal S1, S2. No murmurs, rubs, or gallops  ABDOMEN: + Bowel sounds, Soft, NT, ND   EXTREMITIES:  2+ Peripheral Pulses, No clubbing, cyanosis, or edema  NEUROLOGY: non-focal, aaox3  SKIN: No rashes or lesions    Interpretation of Telemetry:                        11.7   7.53  )-----------( 179                 35.5       134<L>  |  103  |  35<H>  ----------------------------<  138<H>  4.2   |  19<L>  |  1.16    Ca    9.7     Phos  3.7     Mg     1.8(repleted)  TPro  6.9  /  Alb  3.9  /  TBili  0.2  /  DBili  x   /  AST  23  /  ALT  84<H>  /  AlkPhos  61   F/S 134-180             CICU DAY NOTE  Admission date: 11/1/23  Chief complaint/ Diagnosis: CS  HPI: 57yo M w/ hx HTN, CAD w/ 1 stent in 2009, ICH (2008) presenting with abn ekg. Patient presented to [facility] where he was found to have STEMI, recommended to get cath however patient did not want to get it there so it left and came here.  Patient initially had cough, congestion, fever, was placed on antibiotics on Sunday.  Started feeling nauseous and had a presyncopal event after which he presented to ED last night.  Had chest pain as well.  Chest pain is midsternal.  Not currently having chest pain.  Received 4 aspirin 30 min pta. (01 Nov 2023 15:11)    Interval history: Uneventful overnight    REVIEW OF SYSTEMS  Denies CP, Palpitation, SOB, Dyspnea [x ] All other systems negative    MEDICATIONS  (STANDING)  aspirin  chewable 81 milliGRAM(s) Oral daily  atorvastatin 80 milliGRAM(s) Oral at bedtime  budesonide  80 MICROgram(s)/formoterol 4.5 MICROgram(s) Inhaler 2 Puff(s) Inhalation two times a day  carvedilol 25 milliGRAM(s) Oral every 12 hours  chlorhexidine 2% Cloths 1 Application(s) Topical daily  heparin  Infusion 1300 Unit(s)/Hr (14 mL/Hr) IV Continuous <Continuous>  hydrALAZINE 50 milliGRAM(s) Oral every 8 hours  insulin glargine Injectable (LANTUS) 12 Unit(s) SubCutaneous at bedtime  insulin lispro (ADMELOG) corrective regimen sliding scale   SubCutaneous at bedtime  insulin lispro (ADMELOG) corrective regimen sliding scale   SubCutaneous three times a day before meals  insulin lispro Injectable (ADMELOG) 9 Unit(s) SubCutaneous three times a day before meals  isosorbide   dinitrate Tablet (ISORDIL) 30 milliGRAM(s) Oral three times a day  polyethylene glycol 3350 17 Gram(s) Oral two times a day  senna 2 Tablet(s) Oral at bedtime    MEDICATIONS  (PRN):  hydrOXYzine hydrochloride 25 milliGRAM(s) Oral two times a day PRN Anxiety    PAST MEDICAL & SURGICAL HISTORY:  HTN (hypertension)  CAD (coronary artery disease) 2009; stent  Intracranial hemorrhage 2008  Respiratory arrest december 1st  Myocardial infarction, unspecified MI type, unspecified artery  History of coronary artery stent placement    FAMILY HISTORY:  Family history of meningitis (Sibling) Brother, death at age 49 from complications of infection (June 2015)  Family history of hypertension in mother Mother    Allergy  penicillins (Unknown)    ICU Vital Signs Last 24 Hrs  T(C): 36.7 (Max: 37.1)  HR: 75 (71 - 91)  BP: 94/48  (94/48 - 94/48)  BP(mean): 63  (63 - 63)  Aug   RR: 23 (11 - 26)  SpO2: 97%  (94% - 97%) on room air     I&O's Summary  IN: 1242 mL / OUT: 2000 mL / NET: -758 mL    PHYSICAL EXAM  Appearance: Normal, NAD  HEAD:   Normocephalic  EYES:PERRLA, conjunctiva and sclera clear  NECK: Supple, No JVD  CHEST/LUNG: Clear to auscultation bilaterally; No wheezing  HEART: Normal S1, S2. No murmurs, rubs, or gallops  ABDOMEN: + Bowel sounds, Soft, NT, ND   EXTREMITIES:  2+ Peripheral Pulses, No clubbing, cyanosis, or edema  NEUROLOGY: non-focal, aaox3  SKIN: No rashes or lesions    Interpretation of Telemetry:                        11.7   7.53  )-----------( 179                 35.5       134<L>  |  103  |  35<H>  ----------------------------<  138<H>  4.2   |  19<L>  |  1.16    Ca    9.7     Phos  3.7     Mg     1.8(repleted)  TPro  6.9  /  Alb  3.9  /  TBili  0.2  /  DBili  x   /  AST  23  /  ALT  84<H>  /  AlkPhos  61   F/S 134-180             CICU DAY NOTE  Admission date: 11/1/23  Chief complaint/ Diagnosis: CS  HPI: 59yo M w/ hx HTN, CAD w/ 1 stent in 2009, ICH (2008) presenting with abn ekg. Patient presented to [facility] where he was found to have STEMI, recommended to get cath however patient did not want to get it there so it left and came here.  Patient initially had cough, congestion, fever, was placed on antibiotics on Sunday.  Started feeling nauseous and had a presyncopal event after which he presented to ED last night.  Had chest pain as well.  Chest pain is midsternal.  Not currently having chest pain.  Received 4 aspirin 30 min pta. (01 Nov 2023 15:11)    Interval history: Uneventful overnight    REVIEW OF SYSTEMS  Denies CP, Palpitation, SOB, Dyspnea [x ] All other systems negative    MEDICATIONS  (STANDING)  aspirin  chewable 81 milliGRAM(s) Oral daily  atorvastatin 80 milliGRAM(s) Oral at bedtime  budesonide  80 MICROgram(s)/formoterol 4.5 MICROgram(s) Inhaler 2 Puff(s) Inhalation two times a day  carvedilol 25 milliGRAM(s) Oral every 12 hours  chlorhexidine 2% Cloths 1 Application(s) Topical daily  heparin  Infusion 1300 Unit(s)/Hr (14 mL/Hr) IV Continuous <Continuous>  hydrALAZINE 50 milliGRAM(s) Oral every 8 hours  insulin glargine Injectable (LANTUS) 12 Unit(s) SubCutaneous at bedtime  insulin lispro (ADMELOG) corrective regimen sliding scale   SubCutaneous at bedtime  insulin lispro (ADMELOG) corrective regimen sliding scale   SubCutaneous three times a day before meals  insulin lispro Injectable (ADMELOG) 9 Unit(s) SubCutaneous three times a day before meals  isosorbide   dinitrate Tablet (ISORDIL) 30 milliGRAM(s) Oral three times a day  polyethylene glycol 3350 17 Gram(s) Oral two times a day  senna 2 Tablet(s) Oral at bedtime    MEDICATIONS  (PRN):  hydrOXYzine hydrochloride 25 milliGRAM(s) Oral two times a day PRN Anxiety    PAST MEDICAL & SURGICAL HISTORY:  HTN (hypertension)  CAD (coronary artery disease) 2009; stent  Intracranial hemorrhage 2008  Respiratory arrest december 1st  Myocardial infarction, unspecified MI type, unspecified artery  History of coronary artery stent placement    FAMILY HISTORY:  Family history of meningitis (Sibling) Brother, death at age 49 from complications of infection (June 2015)  Family history of hypertension in mother Mother    Allergy  penicillins (Unknown)    ICU Vital Signs Last 24 Hrs  T(C): 36.7 (Max: 37.1)  HR: 75 (71 - 91)  BP: 94/48  (94/48 - 94/48)  BP(mean): 63  (63 - 63)  Aug   RR: 23 (11 - 26)  SpO2: 97%  (94% - 97%) on room air     I&O's Summary  IN: 1242 mL / OUT: 2000 mL / NET: -758 mL    PHYSICAL EXAM  Appearance: Normal, NAD  HEAD:   Normocephalic  EYES:PERRLA, conjunctiva and sclera clear  NECK: Supple, No JVD  CHEST/LUNG: Clear to auscultation bilaterally; No wheezing  HEART: Normal S1, S2. No murmurs, rubs, or gallops  ABDOMEN: + Bowel sounds, Soft, NT, ND   EXTREMITIES:  2+ Peripheral Pulses, No clubbing, cyanosis, or edema  NEUROLOGY: non-focal, aaox3  SKIN: No rashes or lesions    Interpretation of Telemetry:                        11.7   7.53  )-----------( 179                 35.5       134<L>  |  103  |  35<H>  ----------------------------<  138<H>  4.2   |  19<L>  |  1.16    Ca    9.7     Phos  3.7     Mg     1.8(repleted)  TPro  6.9  /  Alb  3.9  /  TBili  0.2  /  DBili  x   /  AST  23  /  ALT  84<H>  /  AlkPhos  61   F/S 134-180

## 2023-12-18 LAB
GLUCOSE BLDC GLUCOMTR-MCNC: 123 MG/DL — HIGH (ref 70–99)
GLUCOSE BLDC GLUCOMTR-MCNC: 123 MG/DL — HIGH (ref 70–99)
GLUCOSE BLDC GLUCOMTR-MCNC: 127 MG/DL — HIGH (ref 70–99)
GLUCOSE BLDC GLUCOMTR-MCNC: 127 MG/DL — HIGH (ref 70–99)
GLUCOSE BLDC GLUCOMTR-MCNC: 142 MG/DL — HIGH (ref 70–99)
GLUCOSE BLDC GLUCOMTR-MCNC: 142 MG/DL — HIGH (ref 70–99)
GLUCOSE BLDC GLUCOMTR-MCNC: 201 MG/DL — HIGH (ref 70–99)
GLUCOSE BLDC GLUCOMTR-MCNC: 201 MG/DL — HIGH (ref 70–99)
RAPID RVP RESULT: SIGNIFICANT CHANGE UP
RAPID RVP RESULT: SIGNIFICANT CHANGE UP
SARS-COV-2 RNA SPEC QL NAA+PROBE: SIGNIFICANT CHANGE UP
SARS-COV-2 RNA SPEC QL NAA+PROBE: SIGNIFICANT CHANGE UP

## 2023-12-18 PROCEDURE — 93010 ELECTROCARDIOGRAM REPORT: CPT | Mod: 77

## 2023-12-18 PROCEDURE — 99232 SBSQ HOSP IP/OBS MODERATE 35: CPT

## 2023-12-18 PROCEDURE — 99233 SBSQ HOSP IP/OBS HIGH 50: CPT

## 2023-12-18 PROCEDURE — 99292 CRITICAL CARE ADDL 30 MIN: CPT

## 2023-12-18 PROCEDURE — 99291 CRITICAL CARE FIRST HOUR: CPT

## 2023-12-18 PROCEDURE — 71045 X-RAY EXAM CHEST 1 VIEW: CPT | Mod: 26

## 2023-12-18 RX ORDER — POLYETHYLENE GLYCOL 3350 17 G/17G
17 POWDER, FOR SOLUTION ORAL
Refills: 0 | Status: DISCONTINUED | OUTPATIENT
Start: 2023-12-18 | End: 2023-12-25

## 2023-12-18 RX ADMIN — BUDESONIDE AND FORMOTEROL FUMARATE DIHYDRATE 2 PUFF(S): 160; 4.5 AEROSOL RESPIRATORY (INHALATION) at 21:21

## 2023-12-18 RX ADMIN — ISOSORBIDE DINITRATE 30 MILLIGRAM(S): 5 TABLET ORAL at 14:03

## 2023-12-18 RX ADMIN — ATORVASTATIN CALCIUM 80 MILLIGRAM(S): 80 TABLET, FILM COATED ORAL at 22:11

## 2023-12-18 RX ADMIN — ISOSORBIDE DINITRATE 30 MILLIGRAM(S): 5 TABLET ORAL at 05:09

## 2023-12-18 RX ADMIN — Medication 50 MILLIGRAM(S): at 14:49

## 2023-12-18 RX ADMIN — CARVEDILOL PHOSPHATE 25 MILLIGRAM(S): 80 CAPSULE, EXTENDED RELEASE ORAL at 18:17

## 2023-12-18 RX ADMIN — Medication 50 MILLIGRAM(S): at 22:12

## 2023-12-18 RX ADMIN — ISOSORBIDE DINITRATE 30 MILLIGRAM(S): 5 TABLET ORAL at 22:11

## 2023-12-18 RX ADMIN — Medication 50 MILLIGRAM(S): at 05:08

## 2023-12-18 RX ADMIN — CARVEDILOL PHOSPHATE 25 MILLIGRAM(S): 80 CAPSULE, EXTENDED RELEASE ORAL at 05:08

## 2023-12-18 RX ADMIN — Medication 81 MILLIGRAM(S): at 14:44

## 2023-12-18 RX ADMIN — INSULIN GLARGINE 12 UNIT(S): 100 INJECTION, SOLUTION SUBCUTANEOUS at 22:12

## 2023-12-18 RX ADMIN — SENNA PLUS 2 TABLET(S): 8.6 TABLET ORAL at 22:11

## 2023-12-18 RX ADMIN — Medication 9 UNIT(S): at 08:36

## 2023-12-18 RX ADMIN — Medication 9 UNIT(S): at 18:18

## 2023-12-18 RX ADMIN — Medication 9 UNIT(S): at 12:02

## 2023-12-18 RX ADMIN — Medication 2: at 18:17

## 2023-12-18 NOTE — PROGRESS NOTE ADULT - SUBJECTIVE AND OBJECTIVE BOX
PATIENT:  DEVORAH VALENCIA  [id number]    CHIEF COMPLAINT:  Patient is a 58y old  Male who presents with a chief complaint of Cardiogenic shock (17 Dec 2023 19:26)      INTERVAL HISTORY/OVERNIGHT EVENTS:  The patient was seen and examined at bedside.     REVIEW OF SYSTEMS:    Constitutional:     [ ] negative [ ] fevers [ ] chills [ ] weight loss [ ] weight gain  HEENT:                  [ ] negative [ ] dry eyes [ ] eye irritation [ ] postnasal drip [ ] nasal congestion  CV:                         [ ] negative  [ ] chest pain [ ] orthopnea [ ] palpitations [ ] murmur  Resp:                     [ ] negative [ ] cough [ ] shortness of breath [ ] dyspnea [ ] wheezing [ ] sputum [ ] hemoptysis  GI:                          [ ] negative [ ] nausea [ ] vomiting [ ] diarrhea [ ] constipation [ ] abd pain [ ] dysphagia   :                        [ ] negative [ ] dysuria [ ] nocturia [ ] hematuria [ ] increased urinary frequency  Musculoskeletal: [ ] negative [ ] back pain [ ] myalgias [ ] arthralgias [ ] fracture  Skin:                       [ ] negative [ ] rash [ ] itch  Neurological:        [ ] negative [ ] headache [ ] dizziness [ ] syncope [ ] weakness [ ] numbness  Psychiatric:           [ ] negative [ ] anxiety [ ] depression  Endocrine:            [ ] negative [ ] diabetes [ ] thyroid problem  Heme/Lymph:      [ ] negative [ ] anemia [ ] bleeding problem  Allergic/Immune: [ ] negative [ ] itchy eyes [ ] nasal discharge [ ] hives [ ] angioedema    [ ] All other systems negative  [ ] Unable to assess ROS because ________.    MEDICATIONS:  MEDICATIONS  (STANDING):  aspirin  chewable 81 milliGRAM(s) Oral daily  atorvastatin 80 milliGRAM(s) Oral at bedtime  budesonide  80 MICROgram(s)/formoterol 4.5 MICROgram(s) Inhaler 2 Puff(s) Inhalation two times a day  carvedilol 25 milliGRAM(s) Oral every 12 hours  chlorhexidine 2% Cloths 1 Application(s) Topical daily  heparin  Infusion 1300 Unit(s)/Hr (14 mL/Hr) IV Continuous <Continuous>  hydrALAZINE 50 milliGRAM(s) Oral every 8 hours  insulin glargine Injectable (LANTUS) 12 Unit(s) SubCutaneous at bedtime  insulin lispro (ADMELOG) corrective regimen sliding scale   SubCutaneous at bedtime  insulin lispro (ADMELOG) corrective regimen sliding scale   SubCutaneous three times a day before meals  insulin lispro Injectable (ADMELOG) 9 Unit(s) SubCutaneous three times a day before meals  isosorbide   dinitrate Tablet (ISORDIL) 30 milliGRAM(s) Oral three times a day  polyethylene glycol 3350 17 Gram(s) Oral two times a day  senna 2 Tablet(s) Oral at bedtime    MEDICATIONS  (PRN):  hydrOXYzine hydrochloride 25 milliGRAM(s) Oral two times a day PRN Anxiety      ALLERGIES:  Allergies    penicillins (Unknown)    Intolerances        OBJECTIVE:  ICU Vital Signs Last 24 Hrs  T(C): 36.7 (18 Dec 2023 04:00), Max: 36.9 (17 Dec 2023 19:00)  T(F): 98.1 (18 Dec 2023 04:00), Max: 98.5 (17 Dec 2023 19:00)  HR: 78 (18 Dec 2023 07:00) (69 - 89)  BP: --  BP(mean): --  ABP: --  ABP(mean): --  RR: 15 (18 Dec 2023 07:00) (15 - 20)  SpO2: 97% (18 Dec 2023 05:00) (95% - 100%)    O2 Parameters below as of 17 Dec 2023 21:00  Patient On (Oxygen Delivery Method): room air            Adult Advanced Hemodynamics Last 24 Hrs  CVP(mm Hg): --  CVP(cm H2O): --  CO: --  CI: --  PA: --  PA(mean): --  PCWP: --  SVR: --  SVRI: --  PVR: --  PVRI: --  CAPILLARY BLOOD GLUCOSE      POCT Blood Glucose.: 208 mg/dL (17 Dec 2023 20:51)  POCT Blood Glucose.: 99 mg/dL (17 Dec 2023 17:16)  POCT Blood Glucose.: 159 mg/dL (17 Dec 2023 12:14)  POCT Blood Glucose.: 115 mg/dL (17 Dec 2023 08:55)    CAPILLARY BLOOD GLUCOSE      POCT Blood Glucose.: 208 mg/dL (17 Dec 2023 20:51)    I&O's Summary    17 Dec 2023 07:01  -  18 Dec 2023 07:00  --------------------------------------------------------  IN: 1136 mL / OUT: 2075 mL / NET: -939 mL      Daily     Daily Weight in k.8 (18 Dec 2023 06:00)    PHYSICAL EXAMINATION:  General: WN/WD NAD  HEENT: PERRLA, EOMI, moist mucous membranes  Neurology: A&Ox3, nonfocal, MOISE x 4  Respiratory: CTA B/L, normal respiratory effort, no wheezes, crackles, rales  CV: RRR, S1S2, no murmurs, rubs or gallops  Abdominal: Soft, NT, ND +BS, Last BM  Extremities: No edema, + peripheral pulses  Incisions:   Tubes:    LABS:                          11.7   7.53  )-----------( 179      ( 17 Dec 2023 01:14 )             35.5     12-17    134<L>  |  103  |  35<H>  ----------------------------<  138<H>  4.2   |  19<L>  |  1.16    Ca    9.7      17 Dec 2023 01:14  Phos  3.7     12-17  Mg     1.8     12-    TPro  6.9  /  Alb  3.9  /  TBili  0.2  /  DBili  x   /  AST  23  /  ALT  84<H>  /  AlkPhos  61  12-17    LIVER FUNCTIONS - ( 17 Dec 2023 01:14 )  Alb: 3.9 g/dL / Pro: 6.9 g/dL / ALK PHOS: 61 U/L / ALT: 84 U/L / AST: 23 U/L / GGT: x           PT/INR - ( 17 Dec 2023 01:14 )   PT: 11.5 sec;   INR: 1.10 ratio         PTT - ( 17 Dec 2023 01:14 )  PTT:71.7 sec        Urinalysis Basic - ( 17 Dec 2023 01:14 )    Color: x / Appearance: x / SG: x / pH: x  Gluc: 138 mg/dL / Ketone: x  / Bili: x / Urobili: x   Blood: x / Protein: x / Nitrite: x   Leuk Esterase: x / RBC: x / WBC x   Sq Epi: x / Non Sq Epi: x / Bacteria: x        TELEMETRY:     EKG:     IMAGING:       PATIENT:  DEVORAH VALENCIA  [id number]    CHIEF COMPLAINT:  Patient is a 58y old  Male who presents with a chief complaint of Cardiogenic shock (17 Dec 2023 19:26)      INTERVAL HISTORY/OVERNIGHT EVENTS:  The patient was seen and examined at bedside.     REVIEW OF SYSTEMS:    Constitutional:     [ ] negative [ ] fevers [ ] chills [ ] weight loss [ ] weight gain  HEENT:                  [ ] negative [ ] dry eyes [ ] eye irritation [ ] postnasal drip [ ] nasal congestion  CV:                         [ ] negative  [ ] chest pain [ ] orthopnea [ ] palpitations [ ] murmur  Resp:                     [ ] negative [ ] cough [ ] shortness of breath [ ] dyspnea [ ] wheezing [ ] sputum [ ] hemoptysis  GI:                          [ ] negative [ ] nausea [ ] vomiting [ ] diarrhea [ ] constipation [ ] abd pain [ ] dysphagia   :                        [ ] negative [ ] dysuria [ ] nocturia [ ] hematuria [ ] increased urinary frequency  Musculoskeletal: [ ] negative [ ] back pain [ ] myalgias [ ] arthralgias [ ] fracture  Skin:                       [ ] negative [ ] rash [ ] itch  Neurological:        [ ] negative [ ] headache [ ] dizziness [ ] syncope [ ] weakness [ ] numbness  Psychiatric:           [ ] negative [ ] anxiety [ ] depression  Endocrine:            [ ] negative [ ] diabetes [ ] thyroid problem  Heme/Lymph:      [ ] negative [ ] anemia [ ] bleeding problem  Allergic/Immune: [ ] negative [ ] itchy eyes [ ] nasal discharge [ ] hives [ ] angioedema    [ ] All other systems negative  [ ] Unable to assess ROS because ________.    MEDICATIONS:  MEDICATIONS  (STANDING):  aspirin  chewable 81 milliGRAM(s) Oral daily  atorvastatin 80 milliGRAM(s) Oral at bedtime  budesonide  80 MICROgram(s)/formoterol 4.5 MICROgram(s) Inhaler 2 Puff(s) Inhalation two times a day  carvedilol 25 milliGRAM(s) Oral every 12 hours  chlorhexidine 2% Cloths 1 Application(s) Topical daily  heparin  Infusion 1300 Unit(s)/Hr (14 mL/Hr) IV Continuous <Continuous>  hydrALAZINE 50 milliGRAM(s) Oral every 8 hours  insulin glargine Injectable (LANTUS) 12 Unit(s) SubCutaneous at bedtime  insulin lispro (ADMELOG) corrective regimen sliding scale   SubCutaneous at bedtime  insulin lispro (ADMELOG) corrective regimen sliding scale   SubCutaneous three times a day before meals  insulin lispro Injectable (ADMELOG) 9 Unit(s) SubCutaneous three times a day before meals  isosorbide   dinitrate Tablet (ISORDIL) 30 milliGRAM(s) Oral three times a day  polyethylene glycol 3350 17 Gram(s) Oral two times a day  senna 2 Tablet(s) Oral at bedtime    MEDICATIONS  (PRN):  hydrOXYzine hydrochloride 25 milliGRAM(s) Oral two times a day PRN Anxiety      ALLERGIES:  Allergies    penicillins (Unknown)    Intolerances        OBJECTIVE:  ICU Vital Signs Last 24 Hrs  T(C): 36.7 (18 Dec 2023 04:00), Max: 36.9 (17 Dec 2023 19:00)  T(F): 98.1 (18 Dec 2023 04:00), Max: 98.5 (17 Dec 2023 19:00)  HR: 78 (18 Dec 2023 07:00) (69 - 89)  BP: --  BP(mean): --  ABP: --  ABP(mean): --  RR: 15 (18 Dec 2023 07:00) (15 - 20)  SpO2: 97% (18 Dec 2023 05:00) (95% - 100%)    O2 Parameters below as of 17 Dec 2023 21:00  Patient On (Oxygen Delivery Method): room air            Adult Advanced Hemodynamics Last 24 Hrs  CVP(mm Hg): --  CVP(cm H2O): --  CO: --  CI: --  PA: --  PA(mean): --  PCWP: --  SVR: --  SVRI: --  PVR: --  PVRI: --  CAPILLARY BLOOD GLUCOSE      POCT Blood Glucose.: 208 mg/dL (17 Dec 2023 20:51)  POCT Blood Glucose.: 99 mg/dL (17 Dec 2023 17:16)  POCT Blood Glucose.: 159 mg/dL (17 Dec 2023 12:14)  POCT Blood Glucose.: 115 mg/dL (17 Dec 2023 08:55)    CAPILLARY BLOOD GLUCOSE      POCT Blood Glucose.: 208 mg/dL (17 Dec 2023 20:51)    I&O's Summary    17 Dec 2023 07:01  -  18 Dec 2023 07:00  --------------------------------------------------------  IN: 1136 mL / OUT: 2075 mL / NET: -939 mL      Daily     Daily Weight in k.8 (18 Dec 2023 06:00)    PHYSICAL EXAMINATION:  General: WN/WD NAD  HEENT: PERRLA, EOMI, moist mucous membranes  Neurology: A&Ox3, nonfocal, MOISE x 4  Respiratory: CTA B/L, normal respiratory effort, no wheezes, crackles, rales  CV: RRR, S1S2, no murmurs, rubs or gallops  Abdominal: Soft, NT, ND +BS, Last BM  Extremities: No edema, + peripheral pulses  Incisions:   Tubes:    LABS:                          11.7   7.53  )-----------( 179      ( 17 Dec 2023 01:14 )             35.5     12-17    134<L>  |  103  |  35<H>  ----------------------------<  138<H>  4.2   |  19<L>  |  1.16    Ca    9.7      17 Dec 2023 01:14  Phos  3.7     12-17  Mg     1.8     12-    TPro  6.9  /  Alb  3.9  /  TBili  0.2  /  DBili  x   /  AST  23  /  ALT  84<H>  /  AlkPhos  61  12-17    LIVER FUNCTIONS - ( 17 Dec 2023 01:14 )  Alb: 3.9 g/dL / Pro: 6.9 g/dL / ALK PHOS: 61 U/L / ALT: 84 U/L / AST: 23 U/L / GGT: x           PT/INR - ( 17 Dec 2023 01:14 )   PT: 11.5 sec;   INR: 1.10 ratio         PTT - ( 17 Dec 2023 01:14 )  PTT:71.7 sec        Urinalysis Basic - ( 17 Dec 2023 01:14 )    Color: x / Appearance: x / SG: x / pH: x  Gluc: 138 mg/dL / Ketone: x  / Bili: x / Urobili: x   Blood: x / Protein: x / Nitrite: x   Leuk Esterase: x / RBC: x / WBC x   Sq Epi: x / Non Sq Epi: x / Bacteria: x        TELEMETRY:     EKG:     IMAGING:       PATIENT:  DEVORAH VALENCIA  [id number]    CHIEF COMPLAINT:  Patient is a 58y old  Male who presents with a chief complaint of Cardiogenic shock (17 Dec 2023 19:26)      INTERVAL HISTORY/OVERNIGHT EVENTS:  The patient was seen and examined at bedside.       MEDICATIONS:  MEDICATIONS  (STANDING):  aspirin  chewable 81 milliGRAM(s) Oral daily  atorvastatin 80 milliGRAM(s) Oral at bedtime  budesonide  80 MICROgram(s)/formoterol 4.5 MICROgram(s) Inhaler 2 Puff(s) Inhalation two times a day  carvedilol 25 milliGRAM(s) Oral every 12 hours  chlorhexidine 2% Cloths 1 Application(s) Topical daily  heparin  Infusion 1300 Unit(s)/Hr (14 mL/Hr) IV Continuous <Continuous>  hydrALAZINE 50 milliGRAM(s) Oral every 8 hours  insulin glargine Injectable (LANTUS) 12 Unit(s) SubCutaneous at bedtime  insulin lispro (ADMELOG) corrective regimen sliding scale   SubCutaneous at bedtime  insulin lispro (ADMELOG) corrective regimen sliding scale   SubCutaneous three times a day before meals  insulin lispro Injectable (ADMELOG) 9 Unit(s) SubCutaneous three times a day before meals  isosorbide   dinitrate Tablet (ISORDIL) 30 milliGRAM(s) Oral three times a day  polyethylene glycol 3350 17 Gram(s) Oral two times a day  senna 2 Tablet(s) Oral at bedtime    MEDICATIONS  (PRN):  hydrOXYzine hydrochloride 25 milliGRAM(s) Oral two times a day PRN Anxiety      ALLERGIES:  Allergies    penicillins (Unknown)    Intolerances      OBJECTIVE:  ICU Vital Signs Last 24 Hrs  T(C): 36.7 (18 Dec 2023 04:00), Max: 36.9 (17 Dec 2023 19:00)  T(F): 98.1 (18 Dec 2023 04:00), Max: 98.5 (17 Dec 2023 19:00)  HR: 78 (18 Dec 2023 07:00) (69 - 89)  BP: --  BP(mean): --  ABP: --  ABP(mean): --  RR: 15 (18 Dec 2023 07:00) (15 - 20)  SpO2: 97% (18 Dec 2023 05:00) (95% - 100%)    O2 Parameters below as of 17 Dec 2023 21:00  Patient On (Oxygen Delivery Method): room air      POCT Blood Glucose.: 208 mg/dL (17 Dec 2023 20:51)  POCT Blood Glucose.: 99 mg/dL (17 Dec 2023 17:16)  POCT Blood Glucose.: 159 mg/dL (17 Dec 2023 12:14)  POCT Blood Glucose.: 115 mg/dL (17 Dec 2023 08:55)    CAPILLARY BLOOD GLUCOSE      POCT Blood Glucose.: 208 mg/dL (17 Dec 2023 20:51)    I&O's Summary    17 Dec 2023 07:01  -  18 Dec 2023 07:00  --------------------------------------------------------  IN: 1136 mL / OUT: 2075 mL / NET: -939 mL      Daily     Daily Weight in k.8 (18 Dec 2023 06:00)    PHYSICAL EXAMINATION:  General: WN/WD NAD  HEENT: PERRLA, EOMI, moist mucous membranes  Neurology: A&Ox3, nonfocal, MOISE x 4  Respiratory: CTA B/L, normal respiratory effort, no wheezes, crackles, rales  CV: RRR, S1S2, no murmurs, rubs or gallops  Abdominal: Soft, NT, ND +BS, Last BM  Extremities: No edema, + peripheral pulses  Incisions:   Tubes:    LABS:                          11.7   7.53  )-----------( 179      ( 17 Dec 2023 01:14 )             35.5     12-17    134<L>  |  103  |  35<H>  ----------------------------<  138<H>  4.2   |  19<L>  |  1.16    Ca    9.7      17 Dec 2023 01:14  Phos  3.7     12-17  Mg     1.8     12-17    TPro  6.9  /  Alb  3.9  /  TBili  0.2  /  DBili  x   /  AST  23  /  ALT  84<H>  /  AlkPhos  61  12-17    LIVER FUNCTIONS - ( 17 Dec 2023 01:14 )  Alb: 3.9 g/dL / Pro: 6.9 g/dL / ALK PHOS: 61 U/L / ALT: 84 U/L / AST: 23 U/L / GGT: x           PT/INR - ( 17 Dec 2023 01:14 )   PT: 11.5 sec;   INR: 1.10 ratio         PTT - ( 17 Dec 2023 01:14 )  PTT:71.7 sec        Urinalysis Basic - ( 17 Dec 2023 01:14 )    Color: x / Appearance: x / SG: x / pH: x  Gluc: 138 mg/dL / Ketone: x  / Bili: x / Urobili: x   Blood: x / Protein: x / Nitrite: x   Leuk Esterase: x / RBC: x / WBC x   Sq Epi: x / Non Sq Epi: x / Bacteria: x        TELEMETRY:     EKG:     IMAGING:       PATIENT:  DEVORAH VALENCIA  [id number]    CHIEF COMPLAINT:  Patient is a 58y old  Male who presents with a chief complaint of Cardiogenic shock (17 Dec 2023 19:26)      INTERVAL HISTORY/OVERNIGHT EVENTS:  The patient was seen and examined at bedside.       MEDICATIONS:  MEDICATIONS  (STANDING):  aspirin  chewable 81 milliGRAM(s) Oral daily  atorvastatin 80 milliGRAM(s) Oral at bedtime  budesonide  80 MICROgram(s)/formoterol 4.5 MICROgram(s) Inhaler 2 Puff(s) Inhalation two times a day  carvedilol 25 milliGRAM(s) Oral every 12 hours  chlorhexidine 2% Cloths 1 Application(s) Topical daily  heparin  Infusion 1300 Unit(s)/Hr (14 mL/Hr) IV Continuous <Continuous>  hydrALAZINE 50 milliGRAM(s) Oral every 8 hours  insulin glargine Injectable (LANTUS) 12 Unit(s) SubCutaneous at bedtime  insulin lispro (ADMELOG) corrective regimen sliding scale   SubCutaneous at bedtime  insulin lispro (ADMELOG) corrective regimen sliding scale   SubCutaneous three times a day before meals  insulin lispro Injectable (ADMELOG) 9 Unit(s) SubCutaneous three times a day before meals  isosorbide   dinitrate Tablet (ISORDIL) 30 milliGRAM(s) Oral three times a day  polyethylene glycol 3350 17 Gram(s) Oral two times a day  senna 2 Tablet(s) Oral at bedtime    MEDICATIONS  (PRN):  hydrOXYzine hydrochloride 25 milliGRAM(s) Oral two times a day PRN Anxiety      ALLERGIES:  Allergies    penicillins (Unknown)    Intolerances      OBJECTIVE:  ICU Vital Signs Last 24 Hrs  T(C): 36.7 (18 Dec 2023 04:00), Max: 36.9 (17 Dec 2023 19:00)  T(F): 98.1 (18 Dec 2023 04:00), Max: 98.5 (17 Dec 2023 19:00)  HR: 78 (18 Dec 2023 07:00) (69 - 89)  BP: --  BP(mean): --  ABP: --  ABP(mean): --  RR: 15 (18 Dec 2023 07:00) (15 - 20)  SpO2: 97% (18 Dec 2023 05:00) (95% - 100%)    O2 Parameters below as of 17 Dec 2023 21:00  Patient On (Oxygen Delivery Method): room air      POCT Blood Glucose.: 208 mg/dL (17 Dec 2023 20:51)  POCT Blood Glucose.: 99 mg/dL (17 Dec 2023 17:16)  POCT Blood Glucose.: 159 mg/dL (17 Dec 2023 12:14)  POCT Blood Glucose.: 115 mg/dL (17 Dec 2023 08:55)    CAPILLARY BLOOD GLUCOSE      POCT Blood Glucose.: 208 mg/dL (17 Dec 2023 20:51)    I&O's Summary    17 Dec 2023 07:01  -  18 Dec 2023 07:00  --------------------------------------------------------  IN: 1136 mL / OUT: 2075 mL / NET: -939 mL      Daily     Daily Weight in k.8 (18 Dec 2023 06:00)    PHYSICAL EXAMINATION:  General: WN/WD NAD  HEENT: PERRLA, EOMI, moist mucous membranes  Neurology: A&Ox3, nonfocal, MOISE x 4  Respiratory: CTA B/L, normal respiratory effort, no wheezes, crackles, rales  CV: RRR, S1S2, no murmurs, rubs or gallops  Abdominal: Soft, NT, ND +BS, Last BM  Extremities: No edema, + peripheral pulses  Incisions:   Tubes:    LABS:                          11.7   7.53  )-----------( 179      ( 17 Dec 2023 01:14 )             35.5     12-17    134<L>  |  103  |  35<H>  ----------------------------<  138<H>  4.2   |  19<L>  |  1.16    Ca    9.7      17 Dec 2023 01:14  Phos  3.7     12-17  Mg     1.8     12-17    TPro  6.9  /  Alb  3.9  /  TBili  0.2  /  DBili  x   /  AST  23  /  ALT  84<H>  /  AlkPhos  61  12-17    LIVER FUNCTIONS - ( 17 Dec 2023 01:14 )  Alb: 3.9 g/dL / Pro: 6.9 g/dL / ALK PHOS: 61 U/L / ALT: 84 U/L / AST: 23 U/L / GGT: x           PT/INR - ( 17 Dec 2023 01:14 )   PT: 11.5 sec;   INR: 1.10 ratio         PTT - ( 17 Dec 2023 01:14 )  PTT:71.7 sec        Urinalysis Basic - ( 17 Dec 2023 01:14 )    Color: x / Appearance: x / SG: x / pH: x  Gluc: 138 mg/dL / Ketone: x  / Bili: x / Urobili: x   Blood: x / Protein: x / Nitrite: x   Leuk Esterase: x / RBC: x / WBC x   Sq Epi: x / Non Sq Epi: x / Bacteria: x        TELEMETRY:     EKG:     IMAGING:       PATIENT:  DEVORAH VALENCIA  [id number]    CHIEF COMPLAINT:  Patient is a 58y old  Male who presents with a chief complaint of Cardiogenic shock (17 Dec 2023 19:26)      INTERVAL HISTORY/OVERNIGHT EVENTS:  The patient was seen and examined at bedside.  No acute events ON      MEDICATIONS:  MEDICATIONS  (STANDING):  aspirin  chewable 81 milliGRAM(s) Oral daily  atorvastatin 80 milliGRAM(s) Oral at bedtime  budesonide  80 MICROgram(s)/formoterol 4.5 MICROgram(s) Inhaler 2 Puff(s) Inhalation two times a day  carvedilol 25 milliGRAM(s) Oral every 12 hours  chlorhexidine 2% Cloths 1 Application(s) Topical daily  heparin  Infusion 1300 Unit(s)/Hr (14 mL/Hr) IV Continuous <Continuous>  hydrALAZINE 50 milliGRAM(s) Oral every 8 hours  insulin glargine Injectable (LANTUS) 12 Unit(s) SubCutaneous at bedtime  insulin lispro (ADMELOG) corrective regimen sliding scale   SubCutaneous at bedtime  insulin lispro (ADMELOG) corrective regimen sliding scale   SubCutaneous three times a day before meals  insulin lispro Injectable (ADMELOG) 9 Unit(s) SubCutaneous three times a day before meals  isosorbide   dinitrate Tablet (ISORDIL) 30 milliGRAM(s) Oral three times a day  polyethylene glycol 3350 17 Gram(s) Oral two times a day  senna 2 Tablet(s) Oral at bedtime    MEDICATIONS  (PRN):  hydrOXYzine hydrochloride 25 milliGRAM(s) Oral two times a day PRN Anxiety      ALLERGIES:  Allergies    penicillins (Unknown)    Intolerances      OBJECTIVE:  ICU Vital Signs Last 24 Hrs  T(C): 36.7 (18 Dec 2023 04:00), Max: 36.9 (17 Dec 2023 19:00)  T(F): 98.1 (18 Dec 2023 04:00), Max: 98.5 (17 Dec 2023 19:00)  HR: 78 (18 Dec 2023 07:00) (69 - 89)  RR: 15 (18 Dec 2023 07:00) (15 - 20)  SpO2: 97% (18 Dec 2023 05:00) (95% - 100%)    O2 Parameters below as of 17 Dec 2023 21:00  Patient On (Oxygen Delivery Method): room air      POCT Blood Glucose.: 208 mg/dL (17 Dec 2023 20:51)  POCT Blood Glucose.: 99 mg/dL (17 Dec 2023 17:16)  POCT Blood Glucose.: 159 mg/dL (17 Dec 2023 12:14)  POCT Blood Glucose.: 115 mg/dL (17 Dec 2023 08:55)      POCT Blood Glucose.: 208 mg/dL (17 Dec 2023 20:51)    I&O's Summary    17 Dec 2023 07:01  -  18 Dec 2023 07:00  --------------------------------------------------------  IN: 1136 mL / OUT: 2075 mL / NET: -939 mL    Daily     Daily Weight in k.8 (18 Dec 2023 06:00)    PHYSICAL EXAMINATION:  General: WN/WD NAD  HEENT: PERRLA, EOMI, moist mucous membranes  Neurology: A&Ox3, nonfocal, MOISE x 4  Respiratory: CTA B/L, normal respiratory effort, no wheezes, crackles, rales  CV: RRR, S1S2, no murmurs, rubs or gallops  Abdominal: Soft, NT, ND +BS,  Extremities: No edema, +IABP    LABS:                          11.7   7.53  )-----------( 179      ( 17 Dec 2023 01:14 )             35.5     12-17    134<L>  |  103  |  35<H>  ----------------------------<  138<H>  4.2   |  19<L>  |  1.16    Ca    9.7      17 Dec 2023 01:14  Phos  3.7     12-17  Mg     1.8     12-17    TPro  6.9  /  Alb  3.9  /  TBili  0.2  /  DBili  x   /  AST  23  /  ALT  84<H>  /  AlkPhos  61  12-17    LIVER FUNCTIONS - ( 17 Dec 2023 01:14 )  Alb: 3.9 g/dL / Pro: 6.9 g/dL / ALK PHOS: 61 U/L / ALT: 84 U/L / AST: 23 U/L / GGT: x           PT/INR - ( 17 Dec 2023 01:14 )   PT: 11.5 sec;   INR: 1.10 ratio         PTT - ( 17 Dec 2023 01:14 )  PTT:71.7 sec         PATIENT:  DEVORAH VALENCIA  [id number]    CHIEF COMPLAINT:  Patient is a 58y old  Male who presents with a chief complaint of Cardiogenic shock (17 Dec 2023 19:26)      INTERVAL HISTORY/OVERNIGHT EVENTS:  The patient was seen and examined at bedside.  No acute events ON      MEDICATIONS:  MEDICATIONS  (STANDING):  aspirin  chewable 81 milliGRAM(s) Oral daily  atorvastatin 80 milliGRAM(s) Oral at bedtime  budesonide  80 MICROgram(s)/formoterol 4.5 MICROgram(s) Inhaler 2 Puff(s) Inhalation two times a day  carvedilol 25 milliGRAM(s) Oral every 12 hours  chlorhexidine 2% Cloths 1 Application(s) Topical daily  heparin  Infusion 1300 Unit(s)/Hr (14 mL/Hr) IV Continuous <Continuous>  hydrALAZINE 50 milliGRAM(s) Oral every 8 hours  insulin glargine Injectable (LANTUS) 12 Unit(s) SubCutaneous at bedtime  insulin lispro (ADMELOG) corrective regimen sliding scale   SubCutaneous at bedtime  insulin lispro (ADMELOG) corrective regimen sliding scale   SubCutaneous three times a day before meals  insulin lispro Injectable (ADMELOG) 9 Unit(s) SubCutaneous three times a day before meals  isosorbide   dinitrate Tablet (ISORDIL) 30 milliGRAM(s) Oral three times a day  polyethylene glycol 3350 17 Gram(s) Oral two times a day  senna 2 Tablet(s) Oral at bedtime    MEDICATIONS  (PRN):  hydrOXYzine hydrochloride 25 milliGRAM(s) Oral two times a day PRN Anxiety      ALLERGIES:  Allergies    penicillins (Unknown)    Intolerances      OBJECTIVE:  ICU Vital Signs Last 24 Hrs  T(C): 36.7 (18 Dec 2023 04:00), Max: 36.9 (17 Dec 2023 19:00)  T(F): 98.1 (18 Dec 2023 04:00), Max: 98.5 (17 Dec 2023 19:00)  HR: 78 (18 Dec 2023 07:00) (69 - 89)  RR: 15 (18 Dec 2023 07:00) (15 - 20)  SpO2: 97% (18 Dec 2023 05:00) (95% - 100%)    O2 Parameters below as of 17 Dec 2023 21:00  Patient On (Oxygen Delivery Method): room air      POCT Blood Glucose.: 208 mg/dL (17 Dec 2023 20:51)  POCT Blood Glucose.: 99 mg/dL (17 Dec 2023 17:16)  POCT Blood Glucose.: 159 mg/dL (17 Dec 2023 12:14)  POCT Blood Glucose.: 115 mg/dL (17 Dec 2023 08:55)      POCT Blood Glucose.: 208 mg/dL (17 Dec 2023 20:51)    I&O's Summary    17 Dec 2023 07:01  -  18 Dec 2023 07:00  --------------------------------------------------------  IN: 1136 mL / OUT: 2075 mL / NET: -939 mL    Daily     Daily Weight in k.8 (18 Dec 2023 06:00)    PHYSICAL EXAMINATION:  General: WN/WD NAD  HEENT: PERRLA, EOMI, moist mucous membranes  Neurology: A&Ox3, nonfocal, MOISE x 4  Respiratory: CTA B/L, normal respiratory effort, no wheezes, crackles, rales  CV: RRR, S1S2, no murmurs, rubs or gallops  Abdominal: Soft, NT, ND +BS,  Extremities: No edema, +IABP    LABS:                          11.7   7.53  )-----------( 179      ( 17 Dec 2023 01:14 )             35.5     12-17    134<L>  |  103  |  35<H>  ----------------------------<  138<H>  4.2   |  19<L>  |  1.16    Ca    9.7      17 Dec 2023 01:14  Phos  3.7     12-17  Mg     1.8     12-17    TPro  6.9  /  Alb  3.9  /  TBili  0.2  /  DBili  x   /  AST  23  /  ALT  84<H>  /  AlkPhos  61  12-17    LIVER FUNCTIONS - ( 17 Dec 2023 01:14 )  Alb: 3.9 g/dL / Pro: 6.9 g/dL / ALK PHOS: 61 U/L / ALT: 84 U/L / AST: 23 U/L / GGT: x           PT/INR - ( 17 Dec 2023 01:14 )   PT: 11.5 sec;   INR: 1.10 ratio         PTT - ( 17 Dec 2023 01:14 )  PTT:71.7 sec         PATIENT:  DEVORAH VALENCIA  [id number]    CHIEF COMPLAINT:  Patient is a 58y old  Male who presents with a chief complaint of Cardiogenic shock (17 Dec 2023 19:26)      INTERVAL HISTORY/OVERNIGHT EVENTS:  The patient was seen and examined at bedside.  No acute events ON      MEDICATIONS:  MEDICATIONS  (STANDING):  aspirin  chewable 81 milliGRAM(s) Oral daily  atorvastatin 80 milliGRAM(s) Oral at bedtime  budesonide  80 MICROgram(s)/formoterol 4.5 MICROgram(s) Inhaler 2 Puff(s) Inhalation two times a day  carvedilol 25 milliGRAM(s) Oral every 12 hours  chlorhexidine 2% Cloths 1 Application(s) Topical daily  heparin  Infusion 1300 Unit(s)/Hr (14 mL/Hr) IV Continuous <Continuous>  hydrALAZINE 50 milliGRAM(s) Oral every 8 hours  insulin glargine Injectable (LANTUS) 12 Unit(s) SubCutaneous at bedtime  insulin lispro (ADMELOG) corrective regimen sliding scale   SubCutaneous at bedtime  insulin lispro (ADMELOG) corrective regimen sliding scale   SubCutaneous three times a day before meals  insulin lispro Injectable (ADMELOG) 9 Unit(s) SubCutaneous three times a day before meals  isosorbide   dinitrate Tablet (ISORDIL) 30 milliGRAM(s) Oral three times a day  polyethylene glycol 3350 17 Gram(s) Oral two times a day  senna 2 Tablet(s) Oral at bedtime    MEDICATIONS  (PRN):  hydrOXYzine hydrochloride 25 milliGRAM(s) Oral two times a day PRN Anxiety      ALLERGIES:  Allergies    penicillins (Unknown)    Intolerances      OBJECTIVE:  ICU Vital Signs Last 24 Hrs  T(C): 36.7 (18 Dec 2023 04:00), Max: 36.9 (17 Dec 2023 19:00)  T(F): 98.1 (18 Dec 2023 04:00), Max: 98.5 (17 Dec 2023 19:00)  HR: 78 (18 Dec 2023 07:00) (69 - 89)  RR: 15 (18 Dec 2023 07:00) (15 - 20)  SpO2: 97% (18 Dec 2023 05:00) (95% - 100%)    O2 Parameters below as of 17 Dec 2023 21:00  Patient On (Oxygen Delivery Method): room air      POCT Blood Glucose.: 208 mg/dL (17 Dec 2023 20:51)  POCT Blood Glucose.: 99 mg/dL (17 Dec 2023 17:16)  POCT Blood Glucose.: 159 mg/dL (17 Dec 2023 12:14)  POCT Blood Glucose.: 115 mg/dL (17 Dec 2023 08:55)      POCT Blood Glucose.: 208 mg/dL (17 Dec 2023 20:51)    I&O's Summary    17 Dec 2023 07:01  -  18 Dec 2023 07:00  --------------------------------------------------------  IN: 1136 mL / OUT: 2075 mL / NET: -939 mL    Daily     Daily Weight in k.8 (18 Dec 2023 06:00)    PHYSICAL EXAMINATION:  General: WN/WD NAD  HEENT: PERRLA, EOMI  Neurology: A&Ox3, nonfocal, MOISE x 4  Respiratory: CTA B/L, normal respiratory effort, no wheezes, crackles, rales  CV: RRR, S1S2  Abdominal: Soft, NT, ND +BS,  Extremities: No edema, +IABP    LABS:                          11.7   7.53  )-----------( 179      ( 17 Dec 2023 01:14 )             35.5     12-17    134<L>  |  103  |  35<H>  ----------------------------<  138<H>  4.2   |  19<L>  |  1.16    Ca    9.7      17 Dec 2023 01:14  Phos  3.7     12-17  Mg     1.8     12-17    TPro  6.9  /  Alb  3.9  /  TBili  0.2  /  DBili  x   /  AST  23  /  ALT  84<H>  /  AlkPhos  61  12-17    LIVER FUNCTIONS - ( 17 Dec 2023 01:14 )  Alb: 3.9 g/dL / Pro: 6.9 g/dL / ALK PHOS: 61 U/L / ALT: 84 U/L / AST: 23 U/L / GGT: x           PT/INR - ( 17 Dec 2023 01:14 )   PT: 11.5 sec;   INR: 1.10 ratio         PTT - ( 17 Dec 2023 01:14 )  PTT:71.7 sec         PATIENT:  DEVORAH VALENCIA  [id number]    CHIEF COMPLAINT:  Patient is a 58y old  Male who presents with a chief complaint of Cardiogenic shock (17 Dec 2023 19:26)      INTERVAL HISTORY/OVERNIGHT EVENTS:  The patient was seen and examined at bedside.  No acute events ON      MEDICATIONS:  MEDICATIONS  (STANDING):  aspirin  chewable 81 milliGRAM(s) Oral daily  atorvastatin 80 milliGRAM(s) Oral at bedtime  budesonide  80 MICROgram(s)/formoterol 4.5 MICROgram(s) Inhaler 2 Puff(s) Inhalation two times a day  carvedilol 25 milliGRAM(s) Oral every 12 hours  chlorhexidine 2% Cloths 1 Application(s) Topical daily  heparin  Infusion 1300 Unit(s)/Hr (14 mL/Hr) IV Continuous <Continuous>  hydrALAZINE 50 milliGRAM(s) Oral every 8 hours  insulin glargine Injectable (LANTUS) 12 Unit(s) SubCutaneous at bedtime  insulin lispro (ADMELOG) corrective regimen sliding scale   SubCutaneous at bedtime  insulin lispro (ADMELOG) corrective regimen sliding scale   SubCutaneous three times a day before meals  insulin lispro Injectable (ADMELOG) 9 Unit(s) SubCutaneous three times a day before meals  isosorbide   dinitrate Tablet (ISORDIL) 30 milliGRAM(s) Oral three times a day  polyethylene glycol 3350 17 Gram(s) Oral two times a day  senna 2 Tablet(s) Oral at bedtime    MEDICATIONS  (PRN):  hydrOXYzine hydrochloride 25 milliGRAM(s) Oral two times a day PRN Anxiety      ALLERGIES:  Allergies    penicillins (Unknown)    Intolerances      OBJECTIVE:  ICU Vital Signs Last 24 Hrs  T(C): 36.7 (18 Dec 2023 04:00), Max: 36.9 (17 Dec 2023 19:00)  T(F): 98.1 (18 Dec 2023 04:00), Max: 98.5 (17 Dec 2023 19:00)  HR: 78 (18 Dec 2023 07:00) (69 - 89)  RR: 15 (18 Dec 2023 07:00) (15 - 20)  SpO2: 97% (18 Dec 2023 05:00) (95% - 100%)    O2 Parameters below as of 17 Dec 2023 21:00  Patient On (Oxygen Delivery Method): room air      POCT Blood Glucose.: 208 mg/dL (17 Dec 2023 20:51)  POCT Blood Glucose.: 99 mg/dL (17 Dec 2023 17:16)  POCT Blood Glucose.: 159 mg/dL (17 Dec 2023 12:14)  POCT Blood Glucose.: 115 mg/dL (17 Dec 2023 08:55)      POCT Blood Glucose.: 208 mg/dL (17 Dec 2023 20:51)    I&O's Summary    17 Dec 2023 07:01  -  18 Dec 2023 07:00  --------------------------------------------------------  IN: 1136 mL / OUT: 2075 mL / NET: -939 mL    Daily     Daily Weight in k.8 (18 Dec 2023 06:00)    PHYSICAL EXAMINATION:  General: WN/WD NAD  HEENT: PERRLA, EOMI  Neurology: A&Ox3, nonfocal, MOISE x 4  Respiratory: CTA B/L, normal respiratory effort, no wheezes, crackles, rales  CV: RRR, S1S2  Abdominal: Soft, NT, ND +BS,  Extremities: No edema, +IABP    LABS:                          11.7   7.53  )-----------( 179      ( 17 Dec 2023 01:14 )             35.5     12-17    134<L>  |  103  |  35<H>  ----------------------------<  138<H>  4.2   |  19<L>  |  1.16    Ca    9.7      17 Dec 2023 01:14  Phos  3.7     12-17  Mg     1.8     12-17    TPro  6.9  /  Alb  3.9  /  TBili  0.2  /  DBili  x   /  AST  23  /  ALT  84<H>  /  AlkPhos  61  12-17    LIVER FUNCTIONS - ( 17 Dec 2023 01:14 )  Alb: 3.9 g/dL / Pro: 6.9 g/dL / ALK PHOS: 61 U/L / ALT: 84 U/L / AST: 23 U/L / GGT: x           PT/INR - ( 17 Dec 2023 01:14 )   PT: 11.5 sec;   INR: 1.10 ratio         PTT - ( 17 Dec 2023 01:14 )  PTT:71.7 sec

## 2023-12-18 NOTE — CHART NOTE - NSCHARTNOTEFT_GEN_A_CORE
Nutrition Follow Up Note  Patient seen for: Nutrition Follow up  Chart reviewed, events noted    Per chart, patient is a 59 y/o male with PMH including HTN, CAD (s/p PCI ), HFrEF (EF severely reduced ) not on GDMT, h/o CVA 2018 (requiring TPA), DM. Patient presents to [facility] for SOB x1 week. Intubated on  for respiratory failure. Extubated to nasal cannula 11/3. Found to be COVID-19 positive w/ persistent fevers. S/p IABP for hemodynamic support in setting of possible sepsis. S/p VFib arrest requiring multiple shocks, ROSC ~10 minutes on , transfers back to CICU.    Source: [x] Patient       [x] EMR        [] RN        [] Family at bedside        [] Other:  - If unable to interview patient: [] Trach/Vent/BiPAP  [] Disoriented/confused/inappropriate to interview    Diet, NPO after Midnight:      NPO Start Date: 18-Dec-2023,   NPO Start Time: 23:59 (23 @ 12:31) [Active]  Diet, Regular (23 @ 08:35) [Active]  -> intact appetite; possible heart transplant tomorrow    Nutrition-related concerns:  - Listed status 2 for OHT  - Remains on IABP for mechanical support  - 12 units lantus, ISS admleog for glycemic control at this time; HbA1c: 8.3% (suboptimal glycemic control for age)    GI: Last BM: 12/16x1 per pt reports. Pt denies GI distress at this time.  Bowel Regimen? [x] Yes - senna, miralax    Weights:   Daily Weight in k.8 (12-18), Weight in k.9 (-16)  Wt history: Pt reports UBW ~180-190 pounds; reports recent weights likely elevated secondary to fluid retention. On/off diuresis. Will monitor trends as able. Noted weight trending downward towards UBW of ~81-86 kG.    Drug Dosing Weight  Height (cm): 175.3 (2023 15:48)  Weight (kg): 98.9 (2023 15:48)  BMI (kg/m2): 32.2 (2023 15:48)-> recalculated based on lowest on daily wt of 87.4 kG (12-) (28.4 kG/m2).  BSA (m2): 2.14 (2023 15:48)  Reported UBW: ~81-86 kG    MEDICATIONS  (STANDING):  aspirin  chewable 81 milliGRAM(s) Oral daily  atorvastatin 80 milliGRAM(s) Oral at bedtime  budesonide  80 MICROgram(s)/formoterol 4.5 MICROgram(s) Inhaler 2 Puff(s) Inhalation two times a day  carvedilol 25 milliGRAM(s) Oral every 12 hours  chlorhexidine 2% Cloths 1 Application(s) Topical daily  heparin  Infusion 1300 Unit(s)/Hr (14 mL/Hr) IV Continuous <Continuous>  hydrALAZINE 50 milliGRAM(s) Oral every 8 hours  insulin glargine Injectable (LANTUS) 12 Unit(s) SubCutaneous at bedtime  insulin lispro (ADMELOG) corrective regimen sliding scale   SubCutaneous three times a day before meals  insulin lispro (ADMELOG) corrective regimen sliding scale   SubCutaneous at bedtime  insulin lispro Injectable (ADMELOG) 9 Unit(s) SubCutaneous three times a day before meals  isosorbide   dinitrate Tablet (ISORDIL) 30 milliGRAM(s) Oral three times a day  polyethylene glycol 3350 17 Gram(s) Oral two times a day  senna 2 Tablet(s) Oral at bedtime    Pertinent Labs:   A1C with Estimated Average Glucose Result: 8.3 % (23 @ 06:14)    Finger Sticks:  POCT Blood Glucose.: 142 mg/dL ( @ 11:57)  POCT Blood Glucose.: 123 mg/dL ( @ 08:29)  POCT Blood Glucose.: 208 mg/dL ( @ 20:51)  POCT Blood Glucose.: 99 mg/dL ( @ 17:16)    Skin per nursing documentation: no pressure injuries per documentation  Edema: none per documentation    Estimated Needs:   [x] recalculated: based on wt of 87 kG with consideration for increased nutrient needs:  Estimated Caloric Needs: (25-30 kcal/kg): 4544-7361 kcal  Estimated Protein Needs: (1.3-1.6 g/kg): 113-139 gm  Defer fluid needs to team.    Previous Nutrition Diagnosis: increased nutrient needs  Nutrition Diagnosis is: [x] ongoing - being addressed with adequate oral intake    Previous Nutrition Diagnosis: Inadequate energy intake  Nutrition Diagnosis is: [x] ongoing; pt appetite has improved    Previous Nutrition Diagnosis: Food & Nutrition-Related Knowledge Deficit   Nutrition Diagnosis is: [x] ongoing - being addressed with ongoing diet education PRN    New Nutrition Diagnosis: [x] N/A    Nutrition Care Plan:  [x] In Progress  [] Achieved  [] Not applicable    Nutrition Interventions:     Education Provided:       [x] Yes: 1. Food safety education re-discussed as pt listed for OHT.  2. RD also had previously discussed education on T2DM nutrition therapy. Provided education on foods containing carbohydrates, foods containing proteins, and portion sizes. Discussed the importance of a balanced meal to maintain blood glucose. Recommended water consumption with avoidance of soda and juice. Discussed to avoid concentrated sweets. Pt somewhat receptive; would benefit from ongoing nutrition education follow up. Pt reports not wanting to be on consistent carbohydrate diet at this time. Reports he is not a "sweets hazel." RD emphasized importance of BG control and especially post-transplant BG control.    Recommendations:  1. Continue Regular diet at this time per pt request  2. RD remains available to provide ongoing nutrition education PRN   3. Trend BG levels, renal indices, LFT's, electrolytes and triglycerides   4. Honor pt food preferences to promote adequate oral intake while in-house     Monitoring and Evaluation:   Continue to monitor nutritional intake, tolerance to diet prescription, weights, labs, skin integrity    RD remains available upon request and will follow up per protocol  Sultana Velazquez, MS, RD, CDN, CNSC avail. on [employer] Teams Nutrition Follow Up Note  Patient seen for: Nutrition Follow up  Chart reviewed, events noted    Per chart, patient is a 57 y/o male with PMH including HTN, CAD (s/p PCI ), HFrEF (EF severely reduced ) not on GDMT, h/o CVA 2018 (requiring TPA), DM. Patient presents to [facility] for SOB x1 week. Intubated on  for respiratory failure. Extubated to nasal cannula 11/3. Found to be COVID-19 positive w/ persistent fevers. S/p IABP for hemodynamic support in setting of possible sepsis. S/p VFib arrest requiring multiple shocks, ROSC ~10 minutes on , transfers back to CICU.    Source: [x] Patient       [x] EMR        [] RN        [] Family at bedside        [] Other:  - If unable to interview patient: [] Trach/Vent/BiPAP  [] Disoriented/confused/inappropriate to interview    Diet, NPO after Midnight:      NPO Start Date: 18-Dec-2023,   NPO Start Time: 23:59 (23 @ 12:31) [Active]  Diet, Regular (23 @ 08:35) [Active]  -> intact appetite; possible heart transplant tomorrow    Nutrition-related concerns:  - Listed status 2 for OHT  - Remains on IABP for mechanical support  - 12 units lantus, ISS admleog for glycemic control at this time; HbA1c: 8.3% (suboptimal glycemic control for age)    GI: Last BM: 12/16x1 per pt reports. Pt denies GI distress at this time.  Bowel Regimen? [x] Yes - senna, miralax    Weights:   Daily Weight in k.8 (12-18), Weight in k.9 (-16)  Wt history: Pt reports UBW ~180-190 pounds; reports recent weights likely elevated secondary to fluid retention. On/off diuresis. Will monitor trends as able. Noted weight trending downward towards UBW of ~81-86 kG.    Drug Dosing Weight  Height (cm): 175.3 (2023 15:48)  Weight (kg): 98.9 (2023 15:48)  BMI (kg/m2): 32.2 (2023 15:48)-> recalculated based on lowest on daily wt of 87.4 kG (12-) (28.4 kG/m2).  BSA (m2): 2.14 (2023 15:48)  Reported UBW: ~81-86 kG    MEDICATIONS  (STANDING):  aspirin  chewable 81 milliGRAM(s) Oral daily  atorvastatin 80 milliGRAM(s) Oral at bedtime  budesonide  80 MICROgram(s)/formoterol 4.5 MICROgram(s) Inhaler 2 Puff(s) Inhalation two times a day  carvedilol 25 milliGRAM(s) Oral every 12 hours  chlorhexidine 2% Cloths 1 Application(s) Topical daily  heparin  Infusion 1300 Unit(s)/Hr (14 mL/Hr) IV Continuous <Continuous>  hydrALAZINE 50 milliGRAM(s) Oral every 8 hours  insulin glargine Injectable (LANTUS) 12 Unit(s) SubCutaneous at bedtime  insulin lispro (ADMELOG) corrective regimen sliding scale   SubCutaneous three times a day before meals  insulin lispro (ADMELOG) corrective regimen sliding scale   SubCutaneous at bedtime  insulin lispro Injectable (ADMELOG) 9 Unit(s) SubCutaneous three times a day before meals  isosorbide   dinitrate Tablet (ISORDIL) 30 milliGRAM(s) Oral three times a day  polyethylene glycol 3350 17 Gram(s) Oral two times a day  senna 2 Tablet(s) Oral at bedtime    Pertinent Labs:   A1C with Estimated Average Glucose Result: 8.3 % (23 @ 06:14)    Finger Sticks:  POCT Blood Glucose.: 142 mg/dL ( @ 11:57)  POCT Blood Glucose.: 123 mg/dL ( @ 08:29)  POCT Blood Glucose.: 208 mg/dL ( @ 20:51)  POCT Blood Glucose.: 99 mg/dL ( @ 17:16)    Skin per nursing documentation: no pressure injuries per documentation  Edema: none per documentation    Estimated Needs:   [x] recalculated: based on wt of 87 kG with consideration for increased nutrient needs:  Estimated Caloric Needs: (25-30 kcal/kg): 4951-8654 kcal  Estimated Protein Needs: (1.3-1.6 g/kg): 113-139 gm  Defer fluid needs to team.    Previous Nutrition Diagnosis: increased nutrient needs  Nutrition Diagnosis is: [x] ongoing - being addressed with adequate oral intake    Previous Nutrition Diagnosis: Inadequate energy intake  Nutrition Diagnosis is: [x] ongoing; pt appetite has improved    Previous Nutrition Diagnosis: Food & Nutrition-Related Knowledge Deficit   Nutrition Diagnosis is: [x] ongoing - being addressed with ongoing diet education PRN    New Nutrition Diagnosis: [x] N/A    Nutrition Care Plan:  [x] In Progress  [] Achieved  [] Not applicable    Nutrition Interventions:     Education Provided:       [x] Yes: 1. Food safety education re-discussed as pt listed for OHT.  2. RD also had previously discussed education on T2DM nutrition therapy. Provided education on foods containing carbohydrates, foods containing proteins, and portion sizes. Discussed the importance of a balanced meal to maintain blood glucose. Recommended water consumption with avoidance of soda and juice. Discussed to avoid concentrated sweets. Pt somewhat receptive; would benefit from ongoing nutrition education follow up. Pt reports not wanting to be on consistent carbohydrate diet at this time. Reports he is not a "sweets hazel." RD emphasized importance of BG control and especially post-transplant BG control.    Recommendations:  1. Continue Regular diet at this time per pt request  2. RD remains available to provide ongoing nutrition education PRN   3. Trend BG levels, renal indices, LFT's, electrolytes and triglycerides   4. Honor pt food preferences to promote adequate oral intake while in-house     Monitoring and Evaluation:   Continue to monitor nutritional intake, tolerance to diet prescription, weights, labs, skin integrity    RD remains available upon request and will follow up per protocol  Sultana Velazquez, MS, RD, CDN, CNSC avail. on [employer] Teams

## 2023-12-18 NOTE — PROGRESS NOTE ADULT - ASSESSMENT
58 male with HTN, CAD (s/p PCI 2008), HFrEF, CVA 2018, and T2DM presenting with chest pressure and unknown tachycardia that was shocked x1, C 11/1 found to have in-stent restenosis of pLAD and  of RCA with elevated RA and PA pressures and severely decreased. Admitted to CICU for management of cardiogenic shock and ADHF requiring IABP 11/1 -11/7, with hospital course c/b vfib arrest requiring reinsertion of IABP. Not recommended for ATs per HF. Currently listed for transplant status 2.    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 AT evaluation launched 11/10, he is ABO A. He was discussed during TSC on 11/16 and was approved for listing, however was found to be Bacteremic with 11/17 Blc Cx growing mixed cultures Staph epi and Enterococcus faecalis and started on IV Vanco. Repeat cultures from 11/18 reveal NGTD and therefore was cleared by ID for listing.     He appears adequately supported on IABP at 1:1, electrically quiescent on oral Amiodarone. Cr is improving with holding diuretics. Labs otherwise notable for worsening thrombocytopenia. Awaiting suitable donor. Now with GM + rods in blood culture on 11/30 (reported on 12/4), restarted on vancomycin, and status 7, repeat cultures now negative x48 hours (12/6), now status 2 again.       ====================== NEUROLOGY=====================  Anxiety  - No Seroquel/antipsychotics since vfib arrest and prolonged QTC  - Psych Eval, recommended SSRI, but pt. refused   - Psych recommending atarax PRN  - Continue to monitor mental status    PT/Conditioning  - Continue band exercises while on bedrest s/t IABP    ==================== RESPIRATORY======================  Acute Hypoxemic Respiratory Failure  - s/p x2 intubations for cardiogenic pulm edema and the in setting of cardiac arrest, resolved - extubated 11/10  - Currently maintaining >95% sats on room air  - Continue incentive spirometry and monitoring of sp02    Asthma  - c/w albuterol, symbicort and spiriva  - On trelegy at home  - Continue to monitor SpO2 with goal >94%    ====================CARDIOVASCULAR==================  Vfib arrest i/s/o ischemia  - Lido gtt off 11/13  - PO Amio load - total of 5g per EP complete 11/17  - Amio dc'd 12/5 for rising LFTs  - Keep K > 4, Mag > 2.2     Cardiogenic shock requiring IABP (11/1- 11/7, 11/9-)  - Likely 2/2 NSTEMI and ADHF  - 11/1 LHC: pLAD 100 % in-stent restenosis & mRCA, 100 %. PCWP 30. IABP placed.  - 11/1 TTE: LV dilated. EF 32 %. Regional WMAs present, mod (grade 2) LV diastolic dysfunction  - 11/2 TTE: EF 22% and + LV thrombus  - IABP swapped 11/20 to RFA, continue 1:1 support - switched sensor to R radial a-line 12/13 due to poor sensing on groin line  - Off Milrinone gtt @ 7:30 am 11/11  - [place] d/c'd due to elevated K levels  - c/w coreg 25 BID for GDMT  - UNOS status 2 as of 12/6  - hydralazine 50 TID and ISD 30 TID for AL reduction    NSTEMI iso stent re-occlusion of pLAD and 100%  of RCA  - EKG on admission w/ LBBB  - DAPT: c/w ASA, Brilinta d/c'd per transplant w/u  - c/w lipitor 80  - cMR deferred given necessity of IABP  - CT sx not recommending CABG, undergoing AT eval    LV thrombus  - c/w heparin gtt w/ therapeutic PTT    ===================== RENAL =========================  Non-oliguric ARIC, resolved   - Baseline Cr: 1-1.22  - Renal US: no evidence of renal artery stenosis  - Trend BMP, lytes daily, replace as needed  - Continue Strict I/Os, avoid nephrotoxins    Mild Hyponatremia/Hyperkalemia iso ARIC  - Continue fluid restriction   - Urea powder d/c'd per renal  - Trend daily  - Continue monitoring urine output, lytes, SCr/ BUN  - Replete lytes prn with goal K >4 and Mg >2    =============== GASTROINTESTINAL===================  Constipation/ileus, resolved  - regular diet  - bowel reg prn    ===================ENDO====================  Type 2 DM  - A1c 8.3   - Continue lantus, premeal, low ISS  - continue FS    ===================HEMATOLOGIC/ONC ===================  - H/H & plts stable  - Monitor H/H and plts  - VTE PPX: heparin gtt    ==================INFECTIOUS DISEASE================  # Positive blood culture, resolved  - Repeat BCx drawn 11/30 for leukocytosis to 12k - positive on 12/4, G+ rods in anaerobic bottle, suspect contaminant  - Repeat cultures x2 sent 12/4 per transplant ID recs - no growth to date  - Vancomycin by trough (12/4-12/6)  - Final microbial ID: Cutibacterium acnes, per ID this is likely to be a skin contaminant, vanc dc'd.   - Dental eval 12/7 to rule out infectious etiology that would have led to bacteremia, no significant findings on exam.     # Enterococcus faecalis bacteremia, resolved  - BCx 11/17+ for enterococcus faecalis x2, Staph epi x1 (likely contaminant)- pan sensitive   - Urine cx 11/18 + enterococcus faecalis  - BCx 11/18 no growth   - IABP site swapped to RFA 11/20  - s/p Vancomycin 1g q12h (11/18-11/27)  - CT A/P negative for infectious pathology    # COVID, resolved  - Off airborne precautions 11/11    # Pre-transplant ID w/u   - Trend ID recs for serologies   - Colonoscopy 11/17 - normal   - Chest CT 11/17 - improved LLL aeration  - s/p immunizations    ==================DERMATOLOGY================  # Upper Extremity Rash  - Patient developed bilateral upper extremity rash after receiving Hepatitis A & B immunizations on 11/24  - Dermatology consulted 12/5 - not likely to be related to vanco  - Recommend clobetasol 0.05% BID to affected areas   - RVP repeated to rule out viral etiology, negative           Additional Information:  Additional Information: 59yo M with ischemic HFrEF, ADHF and cardiogenic shock awaiting heart transplant (status 2)  Neuro: no deficits, prn atarax for anxiety  Pulm: sating well on RA, prn albuterol and symbicort  CV: IABP 1:1 with optical sensor failure now RRA for pressure tracing, coreg, hydral, isordil  Renal: Cr under 1, no issues  GI: DASH diet  Heme: heparin gtt for LV thrombus and IABP.   ID: no active issues  Endo: BG controlled  Derm: Hepatitis immunization induced rash, improving on topical steroids  Lines: IABP (11/20), RRA (12/14)   58 male with HTN, CAD (s/p PCI 2008), HFrEF, CVA 2018, and T2DM presenting with chest pressure and unknown tachycardia that was shocked x1, C 11/1 found to have in-stent restenosis of pLAD and  of RCA with elevated RA and PA pressures and severely decreased. Admitted to CICU for management of cardiogenic shock and ADHF requiring IABP 11/1 -11/7, with hospital course c/b vfib arrest requiring reinsertion of IABP. Not recommended for ATs per HF. Currently listed for transplant status 2.    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 AT evaluation launched 11/10, he is ABO A. He was discussed during TSC on 11/16 and was approved for listing, however was found to be Bacteremic with 11/17 Blc Cx growing mixed cultures Staph epi and Enterococcus faecalis and started on IV Vanco. Repeat cultures from 11/18 reveal NGTD and therefore was cleared by ID for listing.     He appears adequately supported on IABP at 1:1, electrically quiescent on oral Amiodarone. Cr is improving with holding diuretics. Labs otherwise notable for worsening thrombocytopenia. Awaiting suitable donor. Now with GM + rods in blood culture on 11/30 (reported on 12/4), restarted on vancomycin, and status 7, repeat cultures now negative x48 hours (12/6), now status 2 again.       ====================== NEUROLOGY=====================  Anxiety  - No Seroquel/antipsychotics since vfib arrest and prolonged QTC  - Psych Eval, recommended SSRI, but pt. refused   - Psych recommending atarax PRN  - Continue to monitor mental status    PT/Conditioning  - Continue band exercises while on bedrest s/t IABP    ==================== RESPIRATORY======================  Acute Hypoxemic Respiratory Failure  - s/p x2 intubations for cardiogenic pulm edema and the in setting of cardiac arrest, resolved - extubated 11/10  - Currently maintaining >95% sats on room air  - Continue incentive spirometry and monitoring of sp02    Asthma  - c/w albuterol, symbicort and spiriva  - On trelegy at home  - Continue to monitor SpO2 with goal >94%    ====================CARDIOVASCULAR==================  Vfib arrest i/s/o ischemia  - Lido gtt off 11/13  - PO Amio load - total of 5g per EP complete 11/17  - Amio dc'd 12/5 for rising LFTs  - Keep K > 4, Mag > 2.2     Cardiogenic shock requiring IABP (11/1- 11/7, 11/9-)  - Likely 2/2 NSTEMI and ADHF  - 11/1 LHC: pLAD 100 % in-stent restenosis & mRCA, 100 %. PCWP 30. IABP placed.  - 11/1 TTE: LV dilated. EF 32 %. Regional WMAs present, mod (grade 2) LV diastolic dysfunction  - 11/2 TTE: EF 22% and + LV thrombus  - IABP swapped 11/20 to RFA, continue 1:1 support - switched sensor to R radial a-line 12/13 due to poor sensing on groin line  - Off Milrinone gtt @ 7:30 am 11/11  - [place] d/c'd due to elevated K levels  - c/w coreg 25 BID for GDMT  - UNOS status 2 as of 12/6  - hydralazine 50 TID and ISD 30 TID for AL reduction    NSTEMI iso stent re-occlusion of pLAD and 100%  of RCA  - EKG on admission w/ LBBB  - DAPT: c/w ASA, Brilinta d/c'd per transplant w/u  - c/w lipitor 80  - cMR deferred given necessity of IABP  - CT sx not recommending CABG, undergoing AT eval    LV thrombus  - c/w heparin gtt w/ therapeutic PTT    ===================== RENAL =========================  Non-oliguric ARIC, resolved   - Baseline Cr: 1-1.22  - Renal US: no evidence of renal artery stenosis  - Trend BMP, lytes daily, replace as needed  - Continue Strict I/Os, avoid nephrotoxins    Mild Hyponatremia/Hyperkalemia iso ARIC  - Continue fluid restriction   - Urea powder d/c'd per renal  - Trend daily  - Continue monitoring urine output, lytes, SCr/ BUN  - Replete lytes prn with goal K >4 and Mg >2    =============== GASTROINTESTINAL===================  Constipation/ileus, resolved  - regular diet  - bowel reg prn    ===================ENDO====================  Type 2 DM  - A1c 8.3   - Continue lantus, premeal, low ISS  - continue FS    ===================HEMATOLOGIC/ONC ===================  - H/H & plts stable  - Monitor H/H and plts  - VTE PPX: heparin gtt    ==================INFECTIOUS DISEASE================  # Positive blood culture, resolved  - Repeat BCx drawn 11/30 for leukocytosis to 12k - positive on 12/4, G+ rods in anaerobic bottle, suspect contaminant  - Repeat cultures x2 sent 12/4 per transplant ID recs - no growth to date  - Vancomycin by trough (12/4-12/6)  - Final microbial ID: Cutibacterium acnes, per ID this is likely to be a skin contaminant, vanc dc'd.   - Dental eval 12/7 to rule out infectious etiology that would have led to bacteremia, no significant findings on exam.     # Enterococcus faecalis bacteremia, resolved  - BCx 11/17+ for enterococcus faecalis x2, Staph epi x1 (likely contaminant)- pan sensitive   - Urine cx 11/18 + enterococcus faecalis  - BCx 11/18 no growth   - IABP site swapped to RFA 11/20  - s/p Vancomycin 1g q12h (11/18-11/27)  - CT A/P negative for infectious pathology    # COVID, resolved  - Off airborne precautions 11/11    # Pre-transplant ID w/u   - Trend ID recs for serologies   - Colonoscopy 11/17 - normal   - Chest CT 11/17 - improved LLL aeration  - s/p immunizations    ==================DERMATOLOGY================  # Upper Extremity Rash  - Patient developed bilateral upper extremity rash after receiving Hepatitis A & B immunizations on 11/24  - Dermatology consulted 12/5 - not likely to be related to vanco  - Recommend clobetasol 0.05% BID to affected areas   - RVP repeated to rule out viral etiology, negative           Additional Information:  Additional Information: 57yo M with ischemic HFrEF, ADHF and cardiogenic shock awaiting heart transplant (status 2)  Neuro: no deficits, prn atarax for anxiety  Pulm: sating well on RA, prn albuterol and symbicort  CV: IABP 1:1 with optical sensor failure now RRA for pressure tracing, coreg, hydral, isordil  Renal: Cr under 1, no issues  GI: DASH diet  Heme: heparin gtt for LV thrombus and IABP.   ID: no active issues  Endo: BG controlled  Derm: Hepatitis immunization induced rash, improving on topical steroids  Lines: IABP (11/20), RRA (12/14)   58 male with HTN, CAD (s/p PCI 2008), HFrEF, CVA 2018, and T2DM presenting with chest pressure and unknown tachycardia that was shocked x1, C 11/1 found to have in-stent restenosis of pLAD and  of RCA with elevated RA and PA pressures and severely decreased. Admitted to CICU for management of cardiogenic shock and ADHF requiring IABP 11/1 -11/7, with hospital course c/b vfib arrest requiring reinsertion of IABP. Not recommended for ATs per HF. Currently listed for transplant status 2.    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 AT evaluation launched 11/10, he is ABO A. He was discussed during TSC on 11/16 and was approved for listing, however was found to be Bacteremic with 11/17 Blc Cx growing mixed cultures Staph epi and Enterococcus faecalis and started on IV Vanco. Repeat cultures from 11/18 reveal NGTD and therefore was cleared by ID for listing.     He appears adequately supported on IABP at 1:1, electrically quiescent on oral Amiodarone. Cr is improving with holding diuretics. Labs otherwise notable for worsening thrombocytopenia. Awaiting suitable donor. Now with GM + rods in blood culture on 11/30 (reported on 12/4), restarted on vancomycin, and status 7, repeat cultures now negative x48 hours (12/6), now status 2 again.       ====================== NEUROLOGY=====================  Anxiety  - No Seroquel/antipsychotics since vfib arrest and prolonged QTC  - Psych Eval, recommended SSRI, but pt. refused   - Psych recommending atarax PRN  - Continue to monitor mental status    PT/Conditioning  - Continue band exercises while on bedrest s/t IABP    ==================== RESPIRATORY======================  Acute Hypoxemic Respiratory Failure  - s/p x2 intubations for cardiogenic pulm edema and the in setting of cardiac arrest, resolved - extubated 11/10  - Currently maintaining >95% sats on room air  - Continue incentive spirometry and monitoring of sp02    Asthma  - c/w albuterol, symbicort and spiriva  - On trelegy at home  - Continue to monitor SpO2 with goal >94%    ====================CARDIOVASCULAR==================  Vfib arrest i/s/o ischemia  - Lido gtt off 11/13  - PO Amio load - total of 5g per EP complete 11/17  - Amio dc'd 12/5 for rising LFTs  - Keep K > 4, Mag > 2.2     Cardiogenic shock requiring IABP (11/1- 11/7, 11/9-)  - Likely 2/2 NSTEMI and ADHF  - 11/1 LHC: pLAD 100 % in-stent restenosis & mRCA, 100 %. PCWP 30. IABP placed.  - 11/1 TTE: LV dilated. EF 32 %. Regional WMAs present, mod (grade 2) LV diastolic dysfunction  - 11/2 TTE: EF 22% and + LV thrombus  - IABP swapped 11/20 to RFA, continue 1:1 support - switched sensor to R radial a-line 12/13 due to poor sensing on groin line  - Off Milrinone gtt @ 7:30 am 11/11  - [place] d/c'd due to elevated K levels  - c/w coreg 25 BID for GDMT  - UNOS status 2 as of 12/6  - hydralazine 50 TID and ISD 30 TID for AL reduction    NSTEMI iso stent re-occlusion of pLAD and 100%  of RCA  - EKG on admission w/ LBBB  - DAPT: c/w ASA, Brilinta d/c'd per transplant w/u  - c/w lipitor 80  - cMR deferred given necessity of IABP  - CT sx not recommending CABG, undergoing AT eval    LV thrombus  - c/w heparin gtt w/ therapeutic PTT    ===================== RENAL =========================  Non-oliguric ARIC, resolved   - Baseline Cr: 1-1.22  - Renal US: no evidence of renal artery stenosis  - Trend BMP, lytes daily, replace as needed  - Continue Strict I/Os, avoid nephrotoxins    Mild Hyponatremia/Hyperkalemia iso ARIC  - Continue fluid restriction   - Urea powder d/c'd per renal  - Trend daily  - Continue monitoring urine output, lytes, SCr/ BUN  - Replete lytes prn with goal K >4 and Mg >2    =============== GASTROINTESTINAL===================  Constipation/ileus, resolved  - regular diet  - bowel reg prn    ===================ENDO====================  Type 2 DM  - A1c 8.3   - Continue lantus, premeal, low ISS  - continue FS    ===================HEMATOLOGIC/ONC ===================  - H/H & plts stable  - Monitor H/H and plts  - VTE PPX: heparin gtt    ==================INFECTIOUS DISEASE================  # Positive blood culture, resolved  - Repeat BCx drawn 11/30 for leukocytosis to 12k - positive on 12/4, G+ rods in anaerobic bottle, suspect contaminant  - Repeat cultures x2 sent 12/4 per transplant ID recs - no growth to date  - Vancomycin by trough (12/4-12/6)  - Final microbial ID: Cutibacterium acnes, per ID this is likely to be a skin contaminant, vanc dc'd.   - Dental eval 12/7 to rule out infectious etiology that would have led to bacteremia, no significant findings on exam.     # Enterococcus faecalis bacteremia, resolved  - BCx 11/17+ for enterococcus faecalis x2, Staph epi x1 (likely contaminant)- pan sensitive   - Urine cx 11/18 + enterococcus faecalis  - BCx 11/18 no growth   - IABP site swapped to RFA 11/20  - s/p Vancomycin 1g q12h (11/18-11/27)  - CT A/P negative for infectious pathology    # COVID, resolved  - Off airborne precautions 11/11    # Pre-transplant ID w/u   - Trend ID recs for serologies   - Colonoscopy 11/17 - normal   - Chest CT 11/17 - improved LLL aeration  - s/p immunizations    ==================DERMATOLOGY================  # Upper Extremity Rash  - Patient developed bilateral upper extremity rash after receiving Hepatitis A & B immunizations on 11/24  - Dermatology consulted 12/5 - not likely to be related to vanco  - Recommend clobetasol 0.05% BID to affected areas   - RVP repeated to rule out viral etiology, negative      Lines: IABP (11/20), RRA (12/14)   58 male with HTN, CAD (s/p PCI 2008), HFrEF, CVA 2018, and T2DM presenting with chest pressure and unknown tachycardia that was shocked x1, C 11/1 found to have in-stent restenosis of pLAD and  of RCA with elevated RA and PA pressures and severely decreased. Admitted to CICU for management of cardiogenic shock and ADHF requiring IABP 11/1 -11/7, with hospital course c/b vfib arrest requiring reinsertion of IABP. Not recommended for ATs per HF. Currently listed for transplant status 2.    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 AT evaluation launched 11/10, he is ABO A. He was discussed during TSC on 11/16 and was approved for listing, however was found to be Bacteremic with 11/17 Blc Cx growing mixed cultures Staph epi and Enterococcus faecalis and started on IV Vanco. Repeat cultures from 11/18 reveal NGTD and therefore was cleared by ID for listing.     He appears adequately supported on IABP at 1:1, electrically quiescent on oral Amiodarone. Cr is improving with holding diuretics. Labs otherwise notable for worsening thrombocytopenia. Awaiting suitable donor. Now with GM + rods in blood culture on 11/30 (reported on 12/4), restarted on vancomycin, and status 7, repeat cultures now negative x48 hours (12/6), now status 2 again.       ====================== NEUROLOGY=====================  Anxiety  - No Seroquel/antipsychotics since vfib arrest and prolonged QTC  - Psych Eval, recommended SSRI, but pt. refused   - Psych recommending atarax PRN  - Continue to monitor mental status    PT/Conditioning  - Continue band exercises while on bedrest s/t IABP    ==================== RESPIRATORY======================  Acute Hypoxemic Respiratory Failure  - s/p x2 intubations for cardiogenic pulm edema and the in setting of cardiac arrest, resolved - extubated 11/10  - Currently maintaining >95% sats on room air  - Continue incentive spirometry and monitoring of sp02    Asthma  - c/w albuterol, symbicort and spiriva  - On trelegy at home  - Continue to monitor SpO2 with goal >94%    ====================CARDIOVASCULAR==================  Vfib arrest i/s/o ischemia  - Lido gtt off 11/13  - PO Amio load - total of 5g per EP complete 11/17  - Amio dc'd 12/5 for rising LFTs  - Keep K > 4, Mag > 2.2     Cardiogenic shock requiring IABP (11/1- 11/7, 11/9-)  - Likely 2/2 NSTEMI and ADHF  - 11/1 LHC: pLAD 100 % in-stent restenosis & mRCA, 100 %. PCWP 30. IABP placed.  - 11/1 TTE: LV dilated. EF 32 %. Regional WMAs present, mod (grade 2) LV diastolic dysfunction  - 11/2 TTE: EF 22% and + LV thrombus  - IABP swapped 11/20 to RFA, continue 1:1 support - switched sensor to R radial a-line 12/13 due to poor sensing on groin line  - Off Milrinone gtt @ 7:30 am 11/11  - [place] d/c'd due to elevated K levels  - c/w coreg 25 BID for GDMT  - UNOS status 2 as of 12/6  - hydralazine 50 TID and ISD 30 TID for AL reduction    NSTEMI iso stent re-occlusion of pLAD and 100%  of RCA  - EKG on admission w/ LBBB  - DAPT: c/w ASA, Brilinta d/c'd per transplant w/u  - c/w lipitor 80  - cMR deferred given necessity of IABP  - CT sx not recommending CABG, undergoing AT eval    LV thrombus  - c/w heparin gtt w/ therapeutic PTT    ===================== RENAL =========================  Non-oliguric ARIC, resolved   - Baseline Cr: 1-1.22  - Renal US: no evidence of renal artery stenosis  - Trend BMP, lytes daily, replace as needed  - Continue Strict I/Os, avoid nephrotoxins    Mild Hyponatremia/Hyperkalemia iso ARIC  - Continue fluid restriction   - Urea powder d/c'd per renal  - Trend daily  - Continue monitoring urine output, lytes, SCr/ BUN  - Replete lytes prn with goal K >4 and Mg >2    =============== GASTROINTESTINAL===================  Constipation/ileus, resolved  - regular diet  - bowel reg prn    ===================ENDO====================  Type 2 DM  - A1c 8.3   - Continue lantus, premeal, low ISS  - continue FS    ===================HEMATOLOGIC/ONC ===================  - H/H & plts stable  - Monitor H/H and plts  - VTE PPX: heparin gtt    ==================INFECTIOUS DISEASE================  # Positive blood culture, resolved  - Repeat BCx drawn 11/30 for leukocytosis to 12k - positive on 12/4, G+ rods in anaerobic bottle, suspect contaminant  - Repeat cultures x2 sent 12/4 per transplant ID recs - no growth to date  - Vancomycin by trough (12/4-12/6)  - Final microbial ID: Cutibacterium acnes, per ID this is likely to be a skin contaminant, vanc dc'd.   - Dental eval 12/7 to rule out infectious etiology that would have led to bacteremia, no significant findings on exam.     # Enterococcus faecalis bacteremia, resolved  - BCx 11/17+ for enterococcus faecalis x2, Staph epi x1 (likely contaminant)- pan sensitive   - Urine cx 11/18 + enterococcus faecalis  - BCx 11/18 no growth   - IABP site swapped to RFA 11/20  - s/p Vancomycin 1g q12h (11/18-11/27)  - CT A/P negative for infectious pathology    # COVID, resolved  - Off airborne precautions 11/11    # Pre-transplant ID w/u   - Trend ID recs for serologies   - Colonoscopy 11/17 - normal   - Chest CT 11/17 - improved LLL aeration  - s/p immunizations    ==================DERMATOLOGY================  # Upper Extremity Rash  - Patient developed bilateral upper extremity rash after receiving Hepatitis A & B immunizations on 11/24  - Dermatology consulted 12/5 - not likely to be related to vanco  - Recommend clobetasol 0.05% BID to affected areas   - RVP repeated to rule out viral etiology, negative      Lines: IABP (11/20), RRA (12/14)

## 2023-12-18 NOTE — PROGRESS NOTE ADULT - SUBJECTIVE AND OBJECTIVE BOX
INFECTIOUS DISEASES FOLLOW UP-- Courtney Peters  [phone]    This is a follow up note for this  58yMale with  Non-ST elevation myocardial infarction (NSTEMI)        ROS:  CONSTITUTIONAL:  No fever, good appetite  CARDIOVASCULAR:  No chest pain or palpitations  RESPIRATORY:  No dyspnea  GASTROINTESTINAL:  No nausea, vomiting, diarrhea, or abdominal pain  GENITOURINARY:  No dysuria  NEUROLOGIC:  No headache,     Allergies    penicillins (Unknown)    Intolerances        ANTIBIOTICS/RELEVANT:  antimicrobials    immunologic:    OTHER:  aspirin  chewable 81 milliGRAM(s) Oral daily  atorvastatin 80 milliGRAM(s) Oral at bedtime  bisacodyl 5 milliGRAM(s) Oral every 12 hours  budesonide  80 MICROgram(s)/formoterol 4.5 MICROgram(s) Inhaler 2 Puff(s) Inhalation two times a day  carvedilol 25 milliGRAM(s) Oral every 12 hours  chlorhexidine 2% Cloths 1 Application(s) Topical daily  heparin  Infusion 1300 Unit(s)/Hr IV Continuous <Continuous>  hydrALAZINE 50 milliGRAM(s) Oral every 8 hours  hydrOXYzine hydrochloride 25 milliGRAM(s) Oral two times a day PRN  insulin glargine Injectable (LANTUS) 12 Unit(s) SubCutaneous at bedtime  insulin lispro (ADMELOG) corrective regimen sliding scale   SubCutaneous three times a day before meals  insulin lispro (ADMELOG) corrective regimen sliding scale   SubCutaneous at bedtime  insulin lispro Injectable (ADMELOG) 9 Unit(s) SubCutaneous three times a day before meals  isosorbide   dinitrate Tablet (ISORDIL) 30 milliGRAM(s) Oral three times a day  polyethylene glycol 3350 17 Gram(s) Oral two times a day  senna 2 Tablet(s) Oral at bedtime      Objective:  Vital Signs Last 24 Hrs  T(C): 36.7 (18 Dec 2023 12:00), Max: 36.7 (17 Dec 2023 23:00)  T(F): 98.1 (18 Dec 2023 12:00), Max: 98.1 (18 Dec 2023 04:00)  HR: 86 (18 Dec 2023 18:00) (73 - 89)  BP: --  BP(mean): --  RR: 23 (18 Dec 2023 18:00) (14 - 23)  SpO2: 97% (18 Dec 2023 05:00) (97% - 100%)    Parameters below as of 17 Dec 2023 21:00  Patient On (Oxygen Delivery Method): room air        PHYSICAL EXAM:  Constitutional:no acute distress  Eyes:MARVEL, EOMI  Ear/Nose/Throat: no oral lesions, left IJ area ? vessel bulging	  Respiratory: clear BL  Cardiovascular: S1S2  Gastrointestinal:soft, (+) BS, no tenderness  Extremities:no e/e/c  No Lymphadenopathy  IV sites not inflammed.    LABS:                        11.7   7.53  )-----------( 179      ( 17 Dec 2023 01:14 )             35.5     12-17    134<L>  |  103  |  35<H>  ----------------------------<  138<H>  4.2   |  19<L>  |  1.16    Ca    9.7      17 Dec 2023 01:14  Phos  3.7     12-17  Mg     1.8     12-17    TPro  6.9  /  Alb  3.9  /  TBili  0.2  /  DBili  x   /  AST  23  /  ALT  84<H>  /  AlkPhos  61  12-17    PT/INR - ( 17 Dec 2023 01:14 )   PT: 11.5 sec;   INR: 1.10 ratio         PTT - ( 17 Dec 2023 01:14 )  PTT:71.7 sec  Urinalysis Basic - ( 17 Dec 2023 01:14 )    Color: x / Appearance: x / SG: x / pH: x  Gluc: 138 mg/dL / Ketone: x  / Bili: x / Urobili: x   Blood: x / Protein: x / Nitrite: x   Leuk Esterase: x / RBC: x / WBC x   Sq Epi: x / Non Sq Epi: x / Bacteria: x        MICROBIOLOGY:    Respiratory Viral Panel with COVID-19 by TIM (12.18.23 @ 16:36)    Rapid RVP Result: [facility]   SARS-CoV-2: [facility]: This Respiratory Panel uses polymerase chain reaction (PCR) to detect for  adenovirus; coronavirus (HKU1, NL63, 229E, OC43); human metapneumovirus  (hMPV); human enterovirus/rhinovirus (Entero/RV); influenza A; influenza  A/H1; influenza A/H3; influenza A/H1-2009; influenza B; parainfluenza  viruses 1, 2, 3, 4; respiratory syncytial virus; Mycoplasma pneumoniae;  Chlamydophila pneumoniae; and SARS-CoV-2.            RECENT CULTURES:      RADIOLOGY & ADDITIONAL STUDIES:    < from: Xray Chest 1 View- PORTABLE-Routine (Xray Chest 1 View- PORTABLE-Routine .) (12.18.23 @ 09:31) >  COMPARISON STUDY: 12/17/2023    Frontal expiratory view of the chest shows the heart to be normal in   size. IABP marker has been advanced to within 1.5 cm of the aortic knob.    The lungs are clear and there is no evidence of pneumothorax nor definite   pleural effusion.    IMPRESSION:  Marker as noted.    < end of copied text >   INFECTIOUS DISEASES FOLLOW UP-- Courtney Peters  [phone]    This is a follow up note for this  58yMale with  Non-ST elevation myocardial infarction (NSTEMI)        ROS:  CONSTITUTIONAL:  No fever, good appetite  CARDIOVASCULAR:  No chest pain or palpitations  RESPIRATORY:  No dyspnea  GASTROINTESTINAL:  No nausea, vomiting, diarrhea, or abdominal pain  GENITOURINARY:  No dysuria  NEUROLOGIC:  No headache,     Allergies    penicillins (Unknown)    Intolerances        ANTIBIOTICS/RELEVANT:  antimicrobials    immunologic:    OTHER:  aspirin  chewable 81 milliGRAM(s) Oral daily  atorvastatin 80 milliGRAM(s) Oral at bedtime  bisacodyl 5 milliGRAM(s) Oral every 12 hours  budesonide  80 MICROgram(s)/formoterol 4.5 MICROgram(s) Inhaler 2 Puff(s) Inhalation two times a day  carvedilol 25 milliGRAM(s) Oral every 12 hours  chlorhexidine 2% Cloths 1 Application(s) Topical daily  heparin  Infusion 1300 Unit(s)/Hr IV Continuous <Continuous>  hydrALAZINE 50 milliGRAM(s) Oral every 8 hours  hydrOXYzine hydrochloride 25 milliGRAM(s) Oral two times a day PRN  insulin glargine Injectable (LANTUS) 12 Unit(s) SubCutaneous at bedtime  insulin lispro (ADMELOG) corrective regimen sliding scale   SubCutaneous three times a day before meals  insulin lispro (ADMELOG) corrective regimen sliding scale   SubCutaneous at bedtime  insulin lispro Injectable (ADMELOG) 9 Unit(s) SubCutaneous three times a day before meals  isosorbide   dinitrate Tablet (ISORDIL) 30 milliGRAM(s) Oral three times a day  polyethylene glycol 3350 17 Gram(s) Oral two times a day  senna 2 Tablet(s) Oral at bedtime      Objective:  Vital Signs Last 24 Hrs  T(C): 36.7 (18 Dec 2023 12:00), Max: 36.7 (17 Dec 2023 23:00)  T(F): 98.1 (18 Dec 2023 12:00), Max: 98.1 (18 Dec 2023 04:00)  HR: 86 (18 Dec 2023 18:00) (73 - 89)  BP: --  BP(mean): --  RR: 23 (18 Dec 2023 18:00) (14 - 23)  SpO2: 97% (18 Dec 2023 05:00) (97% - 100%)    Parameters below as of 17 Dec 2023 21:00  Patient On (Oxygen Delivery Method): room air        PHYSICAL EXAM:  Constitutional:no acute distress  Eyes:MARVEL, EOMI  Ear/Nose/Throat: no oral lesions, left IJ area ? vessel bulging	  Respiratory: clear BL  Cardiovascular: S1S2  Gastrointestinal:soft, (+) BS, no tenderness  Extremities:no e/e/c  No Lymphadenopathy  IV sites not inflammed.    LABS:                        11.7   7.53  )-----------( 179      ( 17 Dec 2023 01:14 )             35.5     12-17    134<L>  |  103  |  35<H>  ----------------------------<  138<H>  4.2   |  19<L>  |  1.16    Ca    9.7      17 Dec 2023 01:14  Phos  3.7     12-17  Mg     1.8     12-17    TPro  6.9  /  Alb  3.9  /  TBili  0.2  /  DBili  x   /  AST  23  /  ALT  84<H>  /  AlkPhos  61  12-17    PT/INR - ( 17 Dec 2023 01:14 )   PT: 11.5 sec;   INR: 1.10 ratio         PTT - ( 17 Dec 2023 01:14 )  PTT:71.7 sec  Urinalysis Basic - ( 17 Dec 2023 01:14 )    Color: x / Appearance: x / SG: x / pH: x  Gluc: 138 mg/dL / Ketone: x  / Bili: x / Urobili: x   Blood: x / Protein: x / Nitrite: x   Leuk Esterase: x / RBC: x / WBC x   Sq Epi: x / Non Sq Epi: x / Bacteria: x        MICROBIOLOGY:    Respiratory Viral Panel with COVID-19 by TIM (12.18.23 @ 16:36)    Rapid RVP Result: [facility]   SARS-CoV-2: [facility]: This Respiratory Panel uses polymerase chain reaction (PCR) to detect for  adenovirus; coronavirus (HKU1, NL63, 229E, OC43); human metapneumovirus  (hMPV); human enterovirus/rhinovirus (Entero/RV); influenza A; influenza  A/H1; influenza A/H3; influenza A/H1-2009; influenza B; parainfluenza  viruses 1, 2, 3, 4; respiratory syncytial virus; Mycoplasma pneumoniae;  Chlamydophila pneumoniae; and SARS-CoV-2.            RECENT CULTURES:      RADIOLOGY & ADDITIONAL STUDIES:    < from: Xray Chest 1 View- PORTABLE-Routine (Xray Chest 1 View- PORTABLE-Routine .) (12.18.23 @ 09:31) >  COMPARISON STUDY: 12/17/2023    Frontal expiratory view of the chest shows the heart to be normal in   size. IABP marker has been advanced to within 1.5 cm of the aortic knob.    The lungs are clear and there is no evidence of pneumothorax nor definite   pleural effusion.    IMPRESSION:  Marker as noted.    < end of copied text >   INFECTIOUS DISEASES FOLLOW UP-- Courtney Peters  [phone]    This is a follow up note for this  58yMale with  Non-ST elevation myocardial infarction (NSTEMI)  pre heart transplant      ROS:  CONSTITUTIONAL:  No fever, good appetite  CARDIOVASCULAR:  No chest pain or palpitations  RESPIRATORY:  No dyspnea  GASTROINTESTINAL:  No nausea, vomiting, diarrhea, or abdominal pain  GENITOURINARY:  No dysuria  NEUROLOGIC:  No headache,     Allergies    penicillins (Unknown)    Intolerances        ANTIBIOTICS/RELEVANT:  antimicrobials    immunologic:    OTHER:  aspirin  chewable 81 milliGRAM(s) Oral daily  atorvastatin 80 milliGRAM(s) Oral at bedtime  bisacodyl 5 milliGRAM(s) Oral every 12 hours  budesonide  80 MICROgram(s)/formoterol 4.5 MICROgram(s) Inhaler 2 Puff(s) Inhalation two times a day  carvedilol 25 milliGRAM(s) Oral every 12 hours  chlorhexidine 2% Cloths 1 Application(s) Topical daily  heparin  Infusion 1300 Unit(s)/Hr IV Continuous <Continuous>  hydrALAZINE 50 milliGRAM(s) Oral every 8 hours  hydrOXYzine hydrochloride 25 milliGRAM(s) Oral two times a day PRN  insulin glargine Injectable (LANTUS) 12 Unit(s) SubCutaneous at bedtime  insulin lispro (ADMELOG) corrective regimen sliding scale   SubCutaneous three times a day before meals  insulin lispro (ADMELOG) corrective regimen sliding scale   SubCutaneous at bedtime  insulin lispro Injectable (ADMELOG) 9 Unit(s) SubCutaneous three times a day before meals  isosorbide   dinitrate Tablet (ISORDIL) 30 milliGRAM(s) Oral three times a day  polyethylene glycol 3350 17 Gram(s) Oral two times a day  senna 2 Tablet(s) Oral at bedtime      Objective:  Vital Signs Last 24 Hrs  T(C): 36.7 (18 Dec 2023 12:00), Max: 36.7 (17 Dec 2023 23:00)  T(F): 98.1 (18 Dec 2023 12:00), Max: 98.1 (18 Dec 2023 04:00)  HR: 86 (18 Dec 2023 18:00) (73 - 89)  BP: --  BP(mean): --  RR: 23 (18 Dec 2023 18:00) (14 - 23)  SpO2: 97% (18 Dec 2023 05:00) (97% - 100%)    Parameters below as of 17 Dec 2023 21:00  Patient On (Oxygen Delivery Method): room air        PHYSICAL EXAM:  Constitutional:no acute distress  Eyes:MARVEL, EOMI  Ear/Nose/Throat: no oral lesions, left IJ area ? vessel bulging	  Respiratory: clear BL  Cardiovascular: S1S2  Gastrointestinal:soft, (+) BS, no tenderness  Extremities:no e/e/c  IABP in place right femoral  No Lymphadenopathy  IV sites not inflammed.    LABS:                        11.7   7.53  )-----------( 179      ( 17 Dec 2023 01:14 )             35.5     12-17    134<L>  |  103  |  35<H>  ----------------------------<  138<H>  4.2   |  19<L>  |  1.16    Ca    9.7      17 Dec 2023 01:14  Phos  3.7     12-17  Mg     1.8     12-17    TPro  6.9  /  Alb  3.9  /  TBili  0.2  /  DBili  x   /  AST  23  /  ALT  84<H>  /  AlkPhos  61  12-17    PT/INR - ( 17 Dec 2023 01:14 )   PT: 11.5 sec;   INR: 1.10 ratio         PTT - ( 17 Dec 2023 01:14 )  PTT:71.7 sec  Urinalysis Basic - ( 17 Dec 2023 01:14 )    Color: x / Appearance: x / SG: x / pH: x  Gluc: 138 mg/dL / Ketone: x  / Bili: x / Urobili: x   Blood: x / Protein: x / Nitrite: x   Leuk Esterase: x / RBC: x / WBC x   Sq Epi: x / Non Sq Epi: x / Bacteria: x        MICROBIOLOGY:    Respiratory Viral Panel with COVID-19 by TIM (12.18.23 @ 16:36)    Rapid RVP Result: [facility]   SARS-CoV-2: [facility]: This Respiratory Panel uses polymerase chain reaction (PCR) to detect for  adenovirus; coronavirus (HKU1, NL63, 229E, OC43); human metapneumovirus  (hMPV); human enterovirus/rhinovirus (Entero/RV); influenza A; influenza  A/H1; influenza A/H3; influenza A/H1-2009; influenza B; parainfluenza  viruses 1, 2, 3, 4; respiratory syncytial virus; Mycoplasma pneumoniae;  Chlamydophila pneumoniae; and SARS-CoV-2.            RECENT CULTURES:      RADIOLOGY & ADDITIONAL STUDIES:    < from: Xray Chest 1 View- PORTABLE-Routine (Xray Chest 1 View- PORTABLE-Routine .) (12.18.23 @ 09:31) >  COMPARISON STUDY: 12/17/2023    Frontal expiratory view of the chest shows the heart to be normal in   size. IABP marker has been advanced to within 1.5 cm of the aortic knob.    The lungs are clear and there is no evidence of pneumothorax nor definite   pleural effusion.    IMPRESSION:  Marker as noted.    < end of copied text >   INFECTIOUS DISEASES FOLLOW UP-- Courtney Peters  [phone]    This is a follow up note for this  58yMale with  Non-ST elevation myocardial infarction (NSTEMI)  pre heart transplant      ROS:  CONSTITUTIONAL:  No fever, good appetite  CARDIOVASCULAR:  No chest pain or palpitations  RESPIRATORY:  No dyspnea  GASTROINTESTINAL:  No nausea, vomiting, diarrhea, or abdominal pain  GENITOURINARY:  No dysuria  NEUROLOGIC:  No headache,     Allergies    penicillins (Unknown)    Intolerances        ANTIBIOTICS/RELEVANT:  antimicrobials    immunologic:    OTHER:  aspirin  chewable 81 milliGRAM(s) Oral daily  atorvastatin 80 milliGRAM(s) Oral at bedtime  bisacodyl 5 milliGRAM(s) Oral every 12 hours  budesonide  80 MICROgram(s)/formoterol 4.5 MICROgram(s) Inhaler 2 Puff(s) Inhalation two times a day  carvedilol 25 milliGRAM(s) Oral every 12 hours  chlorhexidine 2% Cloths 1 Application(s) Topical daily  heparin  Infusion 1300 Unit(s)/Hr IV Continuous <Continuous>  hydrALAZINE 50 milliGRAM(s) Oral every 8 hours  hydrOXYzine hydrochloride 25 milliGRAM(s) Oral two times a day PRN  insulin glargine Injectable (LANTUS) 12 Unit(s) SubCutaneous at bedtime  insulin lispro (ADMELOG) corrective regimen sliding scale   SubCutaneous three times a day before meals  insulin lispro (ADMELOG) corrective regimen sliding scale   SubCutaneous at bedtime  insulin lispro Injectable (ADMELOG) 9 Unit(s) SubCutaneous three times a day before meals  isosorbide   dinitrate Tablet (ISORDIL) 30 milliGRAM(s) Oral three times a day  polyethylene glycol 3350 17 Gram(s) Oral two times a day  senna 2 Tablet(s) Oral at bedtime      Objective:  Vital Signs Last 24 Hrs  T(C): 36.7 (18 Dec 2023 12:00), Max: 36.7 (17 Dec 2023 23:00)  T(F): 98.1 (18 Dec 2023 12:00), Max: 98.1 (18 Dec 2023 04:00)  HR: 86 (18 Dec 2023 18:00) (73 - 89)  BP: --  BP(mean): --  RR: 23 (18 Dec 2023 18:00) (14 - 23)  SpO2: 97% (18 Dec 2023 05:00) (97% - 100%)    Parameters below as of 17 Dec 2023 21:00  Patient On (Oxygen Delivery Method): room air        PHYSICAL EXAM:  Constitutional:no acute distress  Eyes:MARVEL, EOMI  Ear/Nose/Throat: no oral lesions, left IJ area ? vessel bulging	  Respiratory: clear BL  Cardiovascular: S1S2  Gastrointestinal:soft, (+) BS, no tenderness  Extremities:no e/e/c  IABP in place right femoral  No Lymphadenopathy  IV sites not inflammed.    LABS:                        11.7   7.53  )-----------( 179      ( 17 Dec 2023 01:14 )             35.5     12-17    134<L>  |  103  |  35<H>  ----------------------------<  138<H>  4.2   |  19<L>  |  1.16    Ca    9.7      17 Dec 2023 01:14  Phos  3.7     12-17  Mg     1.8     12-17    TPro  6.9  /  Alb  3.9  /  TBili  0.2  /  DBili  x   /  AST  23  /  ALT  84<H>  /  AlkPhos  61  12-17    PT/INR - ( 17 Dec 2023 01:14 )   PT: 11.5 sec;   INR: 1.10 ratio         PTT - ( 17 Dec 2023 01:14 )  PTT:71.7 sec  Urinalysis Basic - ( 17 Dec 2023 01:14 )    Color: x / Appearance: x / SG: x / pH: x  Gluc: 138 mg/dL / Ketone: x  / Bili: x / Urobili: x   Blood: x / Protein: x / Nitrite: x   Leuk Esterase: x / RBC: x / WBC x   Sq Epi: x / Non Sq Epi: x / Bacteria: x        MICROBIOLOGY:    Respiratory Viral Panel with COVID-19 by TIM (12.18.23 @ 16:36)    Rapid RVP Result: [facility]   SARS-CoV-2: [facility]: This Respiratory Panel uses polymerase chain reaction (PCR) to detect for  adenovirus; coronavirus (HKU1, NL63, 229E, OC43); human metapneumovirus  (hMPV); human enterovirus/rhinovirus (Entero/RV); influenza A; influenza  A/H1; influenza A/H3; influenza A/H1-2009; influenza B; parainfluenza  viruses 1, 2, 3, 4; respiratory syncytial virus; Mycoplasma pneumoniae;  Chlamydophila pneumoniae; and SARS-CoV-2.            RECENT CULTURES:      RADIOLOGY & ADDITIONAL STUDIES:    < from: Xray Chest 1 View- PORTABLE-Routine (Xray Chest 1 View- PORTABLE-Routine .) (12.18.23 @ 09:31) >  COMPARISON STUDY: 12/17/2023    Frontal expiratory view of the chest shows the heart to be normal in   size. IABP marker has been advanced to within 1.5 cm of the aortic knob.    The lungs are clear and there is no evidence of pneumothorax nor definite   pleural effusion.    IMPRESSION:  Marker as noted.    < end of copied text >

## 2023-12-18 NOTE — PROGRESS NOTE ADULT - CRITICAL CARE ATTENDING COMMENT
58yrs, HFrEF, LVEF 22.   s/p PCI. HTN, diabetes.   s/p CVA 2018.   admitted Nov 1st ACS. ADHF, intubated. COVID.    LAD and RCA. IABP placed.   Extubated 11.3.   Attempt to removed IABP lead to VT and replaced.   listed transplant 11.16: ABO A, PRA 0, Status 2.   Positive culutres 11.17: staph epi and enteroccocus.   Positive cultures 12.4 cultibacterum   Relisted Dec 6th Status 2. Last pra 12.12.   Suitable DCD donor possibly identified today   meds: heparin, coreg 25 bid, HDNZ 50 tid, ISDN 30, asa, statin.   HR 60-80SR, afeb, IABP 1: 1 aug mean 70-80. aug diast    i/o: -900, -700  12-17    134<L>  |  103  |  35<H>  ----------------------------<  138<H>  4.2   |  19<L>  |  1.16    Ca    9.7      17 Dec 2023 01:14  Phos  3.7     12-17  Mg     1.8     12-17    TPro  6.9  /  Alb  3.9  /  TBili  0.2  /  DBili  x   /  AST  23  /  ALT  84<H>  /  AlkPhos  61  12-17                        11.7   7.53  )-----------( 179      ( 17 Dec 2023 01:14 )             35.5   hemodynamically stable. await final decision re possible donor.   check labs   Brody Espana 58yrs, HFrEF, LVEF 22.   s/p PCI. HTN, diabetes.   s/p CVA 2018.   admitted Nov 1st ACS. ADHF, intubated. COVID.    LAD and RCA. IABP placed.   Extubated 11.3.   Attempt to removed IABP lead to VT and replaced.   listed transplant 11.16: ABO A, PRA 0, Status 2.   Positive culutres 11.17: staph epi and enteroccocus.   Positive cultures 12.4 cultibacterum   Relisted Dec 6th Status 2. Last pra 12.12.   Suitable DCD donor possibly identified today   meds: heparin, coreg 25 bid, HDNZ 50 tid, ISDN 30, asa, statin.   HR 60-80SR, afeb, IABP 1: 1 aug mean 70-80. aug diast    i/o: -900, -700  12-17    134<L>  |  103  |  35<H>  ----------------------------<  138<H>  4.2   |  19<L>  |  1.16    Ca    9.7      17 Dec 2023 01:14  Phos  3.7     12-17  Mg     1.8     12-17    TPro  6.9  /  Alb  3.9  /  TBili  0.2  /  DBili  x   /  AST  23  /  ALT  84<H>  /  AlkPhos  61  12-17                        11.7   7.53  )-----------( 179      ( 17 Dec 2023 01:14 )             35.5   hemodynamically stable. await final decision re possible donor.   check labs   US left neck   Brody Espana

## 2023-12-18 NOTE — PROGRESS NOTE ADULT - SUBJECTIVE AND OBJECTIVE BOX
ADVANCED HEART FAILURE & TRANSPLANT  - PROGRESS NOTE  *To reach the NS2 Team from 8am to 5pm, please call [phone]   ___________________________________________________________________________    Medications:  aspirin  chewable 81 milliGRAM(s) Oral daily  atorvastatin 80 milliGRAM(s) Oral at bedtime  budesonide  80 MICROgram(s)/formoterol 4.5 MICROgram(s) Inhaler 2 Puff(s) Inhalation two times a day  carvedilol 25 milliGRAM(s) Oral every 12 hours  chlorhexidine 2% Cloths 1 Application(s) Topical daily  heparin  Infusion 1300 Unit(s)/Hr IV Continuous <Continuous>  hydrALAZINE 50 milliGRAM(s) Oral every 8 hours  hydrOXYzine hydrochloride 25 milliGRAM(s) Oral two times a day PRN  insulin glargine Injectable (LANTUS) 12 Unit(s) SubCutaneous at bedtime  insulin lispro (ADMELOG) corrective regimen sliding scale   SubCutaneous at bedtime  insulin lispro (ADMELOG) corrective regimen sliding scale   SubCutaneous three times a day before meals  insulin lispro Injectable (ADMELOG) 9 Unit(s) SubCutaneous three times a day before meals  isosorbide   dinitrate Tablet (ISORDIL) 30 milliGRAM(s) Oral three times a day  polyethylene glycol 3350 17 Gram(s) Oral two times a day  senna 2 Tablet(s) Oral at bedtime      Physical Exam:    Vitals:  Vital Signs Last 24 Hours  T(C): 36.7 (23 @ 04:00), Max: 36.9 (23 @ 19:00)  HR: 76 (23 @ 08:00) (69 - 89)  BP: IABP 1:1 assisted mean 70-82, aug diastolic   RR: 19 (23 @ 08:00) (15 - 20)  SpO2: 97% (23 @ 05:00) (97% - 100%)    Weight in k.8 ( @ 06:00)    I&O's Summary    17 Dec 2023 07:01  -  18 Dec 2023 07:00  --------------------------------------------------------  IN: 1136 mL / OUT: 2075 mL / NET: -939 mL    Labs:                        11.7   7.53  )-----------( 179      ( 17 Dec 2023 01:14 )             35.5         134<L>  |  103  |  35<H>  ----------------------------<  138<H>  4.2   |  19<L>  |  1.16    Ca    9.7      17 Dec 2023 01:14  Phos  3.7       Mg     1.8         TPro  6.9  /  Alb  3.9  /  TBili  0.2  /  DBili  x   /  AST  23  /  ALT  84<H>  /  AlkPhos  61      PT/INR - ( 17 Dec 2023 01:14 )   PT: 11.5 sec;   INR: 1.10 ratio    PTT - ( 17 Dec 2023 01:14 )  PTT:71.7 sec    Lactate, Blood: 0.9 mmol/L ( @ 01:14)     ADVANCED HEART FAILURE & TRANSPLANT  - PROGRESS NOTE  *To reach the NS2 Team from 8am to 5pm, please call [phone]   ___________________________________________________________________________    Medications:  aspirin  chewable 81 milliGRAM(s) Oral daily  atorvastatin 80 milliGRAM(s) Oral at bedtime  budesonide  80 MICROgram(s)/formoterol 4.5 MICROgram(s) Inhaler 2 Puff(s) Inhalation two times a day  carvedilol 25 milliGRAM(s) Oral every 12 hours  chlorhexidine 2% Cloths 1 Application(s) Topical daily  heparin  Infusion 1300 Unit(s)/Hr IV Continuous <Continuous>  hydrALAZINE 50 milliGRAM(s) Oral every 8 hours  hydrOXYzine hydrochloride 25 milliGRAM(s) Oral two times a day PRN  insulin glargine Injectable (LANTUS) 12 Unit(s) SubCutaneous at bedtime  insulin lispro (ADMELOG) corrective regimen sliding scale   SubCutaneous at bedtime  insulin lispro (ADMELOG) corrective regimen sliding scale   SubCutaneous three times a day before meals  insulin lispro Injectable (ADMELOG) 9 Unit(s) SubCutaneous three times a day before meals  isosorbide   dinitrate Tablet (ISORDIL) 30 milliGRAM(s) Oral three times a day  polyethylene glycol 3350 17 Gram(s) Oral two times a day  senna 2 Tablet(s) Oral at bedtime      Physical Exam:    Vitals:  Vital Signs Last 24 Hours  T(C): 36.7 (23 @ 04:00), Max: 36.9 (23 @ 19:00)  HR: 76 (23 @ 08:00) (69 - 89)  BP: IABP 1:1 assisted mean 70-82, aug diastolic   RR: 19 (23 @ 08:00) (15 - 20)  SpO2: 97% (23 @ 05:00) (97% - 100%)    Weight in k.8 ( @ 06:00)    I&O's Summary    17 Dec 2023 07:01  -  18 Dec 2023 07:00  --------------------------------------------------------  IN: 1136 mL / OUT: 2075 mL / NET: -939 mL    Labs:                        11.7   7.53  )-----------( 179      ( 17 Dec 2023 01:14 )             35.5         134<L>  |  103  |  35<H>  ----------------------------<  138<H>  4.2   |  19<L>  |  1.16    Ca    9.7      17 Dec 2023 01:14  Phos  3.7       Mg     1.8         TPro  6.9  /  Alb  3.9  /  TBili  0.2  /  DBili  x   /  AST  23  /  ALT  84<H>  /  AlkPhos  61      PT/INR - ( 17 Dec 2023 01:14 )   PT: 11.5 sec;   INR: 1.10 ratio    PTT - ( 17 Dec 2023 01:14 )  PTT:71.7 sec    Lactate, Blood: 0.9 mmol/L ( @ 01:14)

## 2023-12-18 NOTE — PROGRESS NOTE ADULT - SUBJECTIVE AND OBJECTIVE BOX
LD VALENCIA  58y Male  MRN:[id number]    Patient is a 58y old  Male who presents with a chief complaint of NSTEMI (01 Nov 2023 20:29)    HPI:  57yo M w/ hx HTN, CAD w/ 1 stent in 2009, ICH (2008) presenting with abn ekg. Patient presented to [facility] where he was found to have STEMI, recommended to get cath however patient did not want to get it there so it left and came here.  Patient initially had cough, congestion, fever, was placed on antibiotics on Sunday.  Started feeling nauseous and had a presyncopal event after which he presented to ED last night.  Had chest pain as well.  Chest pain is midsternal.  Not currently having chest pain.  Received 4 aspirin 30 min pta. (01 Nov 2023 15:11)      Patient seen and evaluated at bedside in CCU. interval events noted    Interval HPI: remain in ccu. IABP in place        PAST MEDICAL & SURGICAL HISTORY:  HTN (hypertension)      CAD (coronary artery disease)  2009; stent      Intracranial hemorrhage  2008      Respiratory arrest  december 1st      Myocardial infarction, unspecified MI type, unspecified artery      History of coronary artery stent placement          REVIEW OF SYSTEMS:  as per hpi     VITALS:   Vital Signs Last 24 Hrs  T(C): 36.7 (18 Dec 2023 12:00), Max: 36.7 (17 Dec 2023 23:00)  T(F): 98.1 (18 Dec 2023 12:00), Max: 98.1 (18 Dec 2023 04:00)  HR: 86 (18 Dec 2023 18:00) (73 - 89)  BP: --  BP(mean): --  RR: 23 (18 Dec 2023 18:00) (14 - 23)  SpO2: 97% (18 Dec 2023 05:00) (97% - 100%)    Parameters below as of 17 Dec 2023 21:00  Patient On (Oxygen Delivery Method): room air          PHYSICAL EXAM:  GENERAL: NAD, comfortable   HEAD:  Atraumatic, Normocephalic  EYES: EOMI, PERRLA, conjunctiva and sclera clear  NECK: Supple, No JVD   CHEST/LUNG: Clear to auscultation bilaterally; No wheeze  HEART: Regular rate and rhythm; No murmurs, rubs, or gallops  ABDOMEN: Soft, Nontender, Nondistended; Bowel sounds present  EXTREMITIES:  2+ Peripheral Pulses, No clubbing, cyanosis, or edema  NEUROLOGY: nonfocal  SKIN: +rash  +iabp    Consultant(s) Notes Reviewed:  [x ] YES  [ ] NO  Care Discussed with Consultants/Other Providers [ x] YES  [ ] NO    MEDS:   MEDICATIONS  (STANDING):  aspirin  chewable 81 milliGRAM(s) Oral daily  atorvastatin 80 milliGRAM(s) Oral at bedtime  bisacodyl 5 milliGRAM(s) Oral every 12 hours  budesonide  80 MICROgram(s)/formoterol 4.5 MICROgram(s) Inhaler 2 Puff(s) Inhalation two times a day  carvedilol 25 milliGRAM(s) Oral every 12 hours  chlorhexidine 2% Cloths 1 Application(s) Topical daily  heparin  Infusion 1300 Unit(s)/Hr (14 mL/Hr) IV Continuous <Continuous>  hydrALAZINE 50 milliGRAM(s) Oral every 8 hours  insulin glargine Injectable (LANTUS) 12 Unit(s) SubCutaneous at bedtime  insulin lispro (ADMELOG) corrective regimen sliding scale   SubCutaneous three times a day before meals  insulin lispro (ADMELOG) corrective regimen sliding scale   SubCutaneous at bedtime  insulin lispro Injectable (ADMELOG) 9 Unit(s) SubCutaneous three times a day before meals  isosorbide   dinitrate Tablet (ISORDIL) 30 milliGRAM(s) Oral three times a day  polyethylene glycol 3350 17 Gram(s) Oral two times a day  senna 2 Tablet(s) Oral at bedtime    MEDICATIONS  (PRN):  hydrOXYzine hydrochloride 25 milliGRAM(s) Oral two times a day PRN Anxiety      ALLERGIES:  penicillins (Unknown)      LABS:                                                11.7   7.53  )-----------( 179      ( 17 Dec 2023 01:14 )             35.5   12-17    134<L>  |  103  |  35<H>  ----------------------------<  138<H>  4.2   |  19<L>  |  1.16    Ca    9.7      17 Dec 2023 01:14  Phos  3.7     12-17  Mg     1.8     12-17    TPro  6.9  /  Alb  3.9  /  TBili  0.2  /  DBili  x   /  AST  23  /  ALT  84<H>  /  AlkPhos  61  12-17    < from: CT Abdomen and Pelvis No Cont (11.28.23 @ 03:38) >  IMPRESSION:  Mildly dilated colon and prominent but not overly dilated small bowel   with air-fluid levels. No discrete transition point. Findings are   suggestive of ileus.    Intra-aortic balloon pump with the inferior marker at the level of the   inferior mesenteric artery and the balloon overlying the origins of the   renal arteries, celiac artery, and superior mesenteric artery. Consider   repositioning. Concern for IABP positioning was discussed with LANETTE Hawthorne   on 11/28/2023 at 3:42 AM by Dr. Shepard.    < end of copied text >          < from: Colonoscopy (11.17.23 @ 10:35) >                                                                                                        Impression:          - The entire examined colon is normal on direct and retroflexion views.                       - No specimens collected.  Recommendation:      - Resume previous diet today.                       - No large polyps or masses detected, No objection from GI standpoint to                 proceed with heart transplantation/advanced heart therapies.                       - Please call back should any further questions or concerns arise.    < end of copied text >     < from: Xray Chest 1 View- PORTABLE-Urgent (11.01.23 @ 07:42) >    IMPRESSION:  Clear lungs.    ---End of Report ---        < end of copied text >  < from: TTE W or WO Ultrasound Enhancing Agent (11.01.23 @ 10:23) >  _____________________________     CONCLUSIONS:      1. Left ventricular cavity is moderately dilated. Left ventricular wall thickness is normal. Left ventricular systolic function is severely decreased with an ejection fraction of 32 % by Chinchilla's method of disks. Regional wall motion abnormalities present.   2. Multiple segmental abnormalities exist. See findings.   3. There is moderate (grade 2) left ventricular diastolic dysfunction, with indeterminant filling pressure.   4. Normal right ventricular cavity size, wall thickness, and systolic function.   5. No significant valvular disease.   6. No pericardial effusion seen.   7. Compared to the transthoracic echocardiogram performed on 1/25/2017 the areas of akinesis are unchanged but there has been a decline in LV systolic function with new areas of hypokinesis.    __________________________________________________________________    < end of copied text >  < from: TTE Limited W or WO Ultrasound Enhancing Agent (11.02.23 @ 07:41) >  __________________________     CONCLUSIONS:      1. After obtaining consent, Definity ultrasound enhancing agent was given for enhanced left ventricular opacification and improved delineation of the left ventricular endocardial borders. Left ventricular systolic function is severely decreased with a calculated ejection fraction of 22 % by the Chinchilla's biplane method of disks. There is a left ventricular thrombus.   2. Findings were discussed with Litzy BOSS on 11/2/2023 at 8.49am.   3. There is a left ventricular thrombus.    _________________________________________________________________    < end of copied text >   LD VALENCIA  58y Male  MRN:[id number]    Patient is a 58y old  Male who presents with a chief complaint of NSTEMI (01 Nov 2023 20:29)    HPI:  57yo M w/ hx HTN, CAD w/ 1 stent in 2009, ICH (2008) presenting with abn ekg. Patient presented to [facility] where he was found to have STEMI, recommended to get cath however patient did not want to get it there so it left and came here.  Patient initially had cough, congestion, fever, was placed on antibiotics on Sunday.  Started feeling nauseous and had a presyncopal event after which he presented to ED last night.  Had chest pain as well.  Chest pain is midsternal.  Not currently having chest pain.  Received 4 aspirin 30 min pta. (01 Nov 2023 15:11)      Patient seen and evaluated at bedside in CCU. interval events noted    Interval HPI: remain in ccu. IABP in place        PAST MEDICAL & SURGICAL HISTORY:  HTN (hypertension)      CAD (coronary artery disease)  2009; stent      Intracranial hemorrhage  2008      Respiratory arrest  december 1st      Myocardial infarction, unspecified MI type, unspecified artery      History of coronary artery stent placement          REVIEW OF SYSTEMS:  as per hpi     VITALS:   Vital Signs Last 24 Hrs  T(C): 36.7 (18 Dec 2023 12:00), Max: 36.7 (17 Dec 2023 23:00)  T(F): 98.1 (18 Dec 2023 12:00), Max: 98.1 (18 Dec 2023 04:00)  HR: 86 (18 Dec 2023 18:00) (73 - 89)  BP: --  BP(mean): --  RR: 23 (18 Dec 2023 18:00) (14 - 23)  SpO2: 97% (18 Dec 2023 05:00) (97% - 100%)    Parameters below as of 17 Dec 2023 21:00  Patient On (Oxygen Delivery Method): room air          PHYSICAL EXAM:  GENERAL: NAD, comfortable   HEAD:  Atraumatic, Normocephalic  EYES: EOMI, PERRLA, conjunctiva and sclera clear  NECK: Supple, No JVD   CHEST/LUNG: Clear to auscultation bilaterally; No wheeze  HEART: Regular rate and rhythm; No murmurs, rubs, or gallops  ABDOMEN: Soft, Nontender, Nondistended; Bowel sounds present  EXTREMITIES:  2+ Peripheral Pulses, No clubbing, cyanosis, or edema  NEUROLOGY: nonfocal  SKIN: +rash  +iabp    Consultant(s) Notes Reviewed:  [x ] YES  [ ] NO  Care Discussed with Consultants/Other Providers [ x] YES  [ ] NO    MEDS:   MEDICATIONS  (STANDING):  aspirin  chewable 81 milliGRAM(s) Oral daily  atorvastatin 80 milliGRAM(s) Oral at bedtime  bisacodyl 5 milliGRAM(s) Oral every 12 hours  budesonide  80 MICROgram(s)/formoterol 4.5 MICROgram(s) Inhaler 2 Puff(s) Inhalation two times a day  carvedilol 25 milliGRAM(s) Oral every 12 hours  chlorhexidine 2% Cloths 1 Application(s) Topical daily  heparin  Infusion 1300 Unit(s)/Hr (14 mL/Hr) IV Continuous <Continuous>  hydrALAZINE 50 milliGRAM(s) Oral every 8 hours  insulin glargine Injectable (LANTUS) 12 Unit(s) SubCutaneous at bedtime  insulin lispro (ADMELOG) corrective regimen sliding scale   SubCutaneous three times a day before meals  insulin lispro (ADMELOG) corrective regimen sliding scale   SubCutaneous at bedtime  insulin lispro Injectable (ADMELOG) 9 Unit(s) SubCutaneous three times a day before meals  isosorbide   dinitrate Tablet (ISORDIL) 30 milliGRAM(s) Oral three times a day  polyethylene glycol 3350 17 Gram(s) Oral two times a day  senna 2 Tablet(s) Oral at bedtime    MEDICATIONS  (PRN):  hydrOXYzine hydrochloride 25 milliGRAM(s) Oral two times a day PRN Anxiety      ALLERGIES:  penicillins (Unknown)      LABS:                                                11.7   7.53  )-----------( 179      ( 17 Dec 2023 01:14 )             35.5   12-17    134<L>  |  103  |  35<H>  ----------------------------<  138<H>  4.2   |  19<L>  |  1.16    Ca    9.7      17 Dec 2023 01:14  Phos  3.7     12-17  Mg     1.8     12-17    TPro  6.9  /  Alb  3.9  /  TBili  0.2  /  DBili  x   /  AST  23  /  ALT  84<H>  /  AlkPhos  61  12-17    < from: CT Abdomen and Pelvis No Cont (11.28.23 @ 03:38) >  IMPRESSION:  Mildly dilated colon and prominent but not overly dilated small bowel   with air-fluid levels. No discrete transition point. Findings are   suggestive of ileus.    Intra-aortic balloon pump with the inferior marker at the level of the   inferior mesenteric artery and the balloon overlying the origins of the   renal arteries, celiac artery, and superior mesenteric artery. Consider   repositioning. Concern for IABP positioning was discussed with LANETTE Hawthorne   on 11/28/2023 at 3:42 AM by Dr. Shepard.    < end of copied text >          < from: Colonoscopy (11.17.23 @ 10:35) >                                                                                                        Impression:          - The entire examined colon is normal on direct and retroflexion views.                       - No specimens collected.  Recommendation:      - Resume previous diet today.                       - No large polyps or masses detected, No objection from GI standpoint to                 proceed with heart transplantation/advanced heart therapies.                       - Please call back should any further questions or concerns arise.    < end of copied text >     < from: Xray Chest 1 View- PORTABLE-Urgent (11.01.23 @ 07:42) >    IMPRESSION:  Clear lungs.    ---End of Report ---        < end of copied text >  < from: TTE W or WO Ultrasound Enhancing Agent (11.01.23 @ 10:23) >  _____________________________     CONCLUSIONS:      1. Left ventricular cavity is moderately dilated. Left ventricular wall thickness is normal. Left ventricular systolic function is severely decreased with an ejection fraction of 32 % by Chinchilla's method of disks. Regional wall motion abnormalities present.   2. Multiple segmental abnormalities exist. See findings.   3. There is moderate (grade 2) left ventricular diastolic dysfunction, with indeterminant filling pressure.   4. Normal right ventricular cavity size, wall thickness, and systolic function.   5. No significant valvular disease.   6. No pericardial effusion seen.   7. Compared to the transthoracic echocardiogram performed on 1/25/2017 the areas of akinesis are unchanged but there has been a decline in LV systolic function with new areas of hypokinesis.    __________________________________________________________________    < end of copied text >  < from: TTE Limited W or WO Ultrasound Enhancing Agent (11.02.23 @ 07:41) >  __________________________     CONCLUSIONS:      1. After obtaining consent, Definity ultrasound enhancing agent was given for enhanced left ventricular opacification and improved delineation of the left ventricular endocardial borders. Left ventricular systolic function is severely decreased with a calculated ejection fraction of 22 % by the Chinchilla's biplane method of disks. There is a left ventricular thrombus.   2. Findings were discussed with Litzy BOSS on 11/2/2023 at 8.49am.   3. There is a left ventricular thrombus.    _________________________________________________________________    < end of copied text >

## 2023-12-18 NOTE — PROGRESS NOTE ADULT - SUBJECTIVE AND OBJECTIVE BOX
DEVORAH VALENCIA  MRN-[id number]  Patient is a 58y old  Male who presents with a chief complaint of pre heart transplant evaluation (18 Dec 2023 19:26)    HPI:  pt seen and approx 1:30 pm  in [facility]    59yo M w/ hx HTN, CAD w/ 1 stent in , ICH () presenting with abn ekg. Patient presented to [facility] where he was found to have STEMI, recommended to get cath however patient did not want to get it there so it left and came here.  Patient initially had cough, congestion, fever, was placed on antibiotics on .  Started feeling nauseous and had a presyncopal event after which he presented to ED last night.  Had chest pain as well.  Chest pain is midsternal.  Not currently having chest pain.  Received 4 aspirin 30 min pta. (2023 15:11)      Hospital Course:    24 HOUR EVENTS:    REVIEW OF SYSTEMS:    CONSTITUTIONAL: No weakness, fevers or chills  EYES/ENT: No visual changes;  No vertigo or throat pain   NECK: No pain or stiffness  RESPIRATORY: No cough, wheezing, hemoptysis; No shortness of breath  CARDIOVASCULAR: No chest pain or palpitations  GASTROINTESTINAL: No abdominal or epigastric pain. No nausea, vomiting, or hematemesis; No diarrhea or constipation. No melena or hematochezia.  GENITOURINARY: No dysuria, frequency or hematuria  NEUROLOGICAL: No numbness or weakness  SKIN: No itching, rashes      ICU Vital Signs Last 24 Hrs  T(C): 36.8 (18 Dec 2023 19:00), Max: 36.8 (18 Dec 2023 19:00)  T(F): 98.2 (18 Dec 2023 19:00), Max: 98.2 (18 Dec 2023 19:00)  HR: 79 (18 Dec 2023 19:00) (73 - 89)  BP: --  BP(mean): --  ABP: --  ABP(mean): --  RR: 14 (18 Dec 2023 19:00) (14 - 23)  SpO2: 99% (18 Dec 2023 19:00) (97% - 100%)    O2 Parameters below as of 18 Dec 2023 19:00  Patient On (Oxygen Delivery Method): room air            CVP(mm Hg): --  CO: --  CI: --  PA: --  PA(mean): --  PA(direct): --  PCWP: --  LA: --  RA: --  SVR: --  SVRI: --  PVR: --  PVRI: --  I&O's Summary    17 Dec 2023 07:01  -  18 Dec 2023 07:00  --------------------------------------------------------  IN: 1136 mL / OUT: 2075 mL / NET: -939 mL    18 Dec 2023 07:01  -  18 Dec 2023 20:27  --------------------------------------------------------  IN: 632 mL / OUT: 600 mL / NET: 32 mL        CAPILLARY BLOOD GLUCOSE    CAPILLARY BLOOD GLUCOSE      POCT Blood Glucose.: 201 mg/dL (18 Dec 2023 18:13)      PHYSICAL EXAM:  GENERAL: No acute distress, well-developed  HEAD:  Atraumatic, Normocephalic  EYES: EOMI, PERRLA, conjunctiva and sclera clear  NECK: Supple, no lymphadenopathy, no JVD  CHEST/LUNG: CTAB; No wheezes, rales, or rhonchi  HEART: Regular rate and rhythm. Normal S1/S2. No murmurs, rubs, or gallops  ABDOMEN: Soft, non-tender, non-distended; normal bowel sounds, no organomegaly  EXTREMITIES:  2+ peripheral pulses b/l, No clubbing, cyanosis, or edema  NEUROLOGY: A&O x 3, no focal deficits  SKIN: No rashes or lesions    ============================I/O===========================   I&O's Detail    17 Dec 2023 07:01  -  18 Dec 2023 07:00  --------------------------------------------------------  IN:    Heparin: 336 mL    Oral Fluid: 800 mL  Total IN: 1136 mL    OUT:    Voided (mL): 2075 mL  Total OUT: 2075 mL    Total NET: -939 mL      18 Dec 2023 07:01  -  18 Dec 2023 20:27  --------------------------------------------------------  IN:    Heparin: 182 mL    Oral Fluid: 450 mL  Total IN: 632 mL    OUT:    Voided (mL): 600 mL  Total OUT: 600 mL    Total NET: 32 mL        ============================ LABS =========================                        11.7   7.53  )-----------( 179      ( 17 Dec 2023 01:14 )             35.5     -    134<L>  |  103  |  35<H>  ----------------------------<  138<H>  4.2   |  19<L>  |  1.16    Ca    9.7      17 Dec 2023 01:14  Phos  3.7       Mg     1.8         TPro  6.9  /  Alb  3.9  /  TBili  0.2  /  DBili  x   /  AST  23  /  ALT  84<H>  /  AlkPhos  61  -                LIVER FUNCTIONS - ( 17 Dec 2023 01:14 )  Alb: 3.9 g/dL / Pro: 6.9 g/dL / ALK PHOS: 61 U/L / ALT: 84 U/L / AST: 23 U/L / GGT: x           PT/INR - ( 17 Dec 2023 01:14 )   PT: 11.5 sec;   INR: 1.10 ratio         PTT - ( 17 Dec 2023 01:14 )  PTT:71.7 sec    Lactate, Blood: 0.9 mmol/L (23 @ 01:14)    Urinalysis Basic - ( 17 Dec 2023 01:14 )    Color: x / Appearance: x / SG: x / pH: x  Gluc: 138 mg/dL / Ketone: x  / Bili: x / Urobili: x   Blood: x / Protein: x / Nitrite: x   Leuk Esterase: x / RBC: x / WBC x   Sq Epi: x / Non Sq Epi: x / Bacteria: x      ======================Micro/Rad/Cardio=================  Telemtry: Reviewed   EKG: Reviewed  CXR: Reviewed  Culture: Reviewed   Echo:   Cath: Cardiac Cath Lab - Adult:   [facility]  Department of Cardiology  [address] [place] [address]  [phone]  Cath Lab Report -- Comprehensive Report  Patient: LD VALENCIA  Study date: 2017  Account number: [id number]  MR number: [id number]  : 1965  Gender: Male  Race: W  Case Physician(s):  Jairon Holguin M.D.  Referring Physician:  Luc Lynn M.D.  INDICATIONS: Unstable angina - CCS4.  HISTORY: The patient has a history of coronary artery disease. The patient  hashypertension and medication-treated dyslipidemia.  PROCEDURE:  --  Left heart catheterization with ventriculography.  --  Left coronary angiography.  --  Right coronary angiography.  TECHNIQUE: The risks and alternatives of the procedures and conscious  sedation were explained to the patient and informed consent was obtained.  Cardiac catheterization performed electively.  Local anesthetic given. Right radial artery access. A 6FR PRELUDE KIT was  inserted in the vessel. Left heart catheterization. Ventriculography was  performed. A 5FR FR4.0 EXPO catheter was utilized. Left coronary artery  angiography. The vessel was injected utilizing a 5FR FL3.5 EXPO catheter.  Right coronary artery angiography. The vessel was injected utilizing a 5FR  FR4.0 EXPO catheter. RADIATION EXPOSURE: 1.1 min.  CONTRAST GIVEN: Omnipaque 55 ml.  MEDICATIONS GIVEN: Midazolam, 1 mg, IV. Fentanyl, 25 mcg, IV. Verapamil  (Isoptin, Calan, Covera), 2.5 mg, IA. Heparin, 3000 units, IA.  VENTRICLES: Global left ventricular function was moderately depressed. EF  estimated was 40 %.  CORONARY VESSELS: The coronary circulation is right dominant.  LM:   --  LM: Normal.  LAD:   --  Proximal LAD: There was a 50 % stenosis.  CX:   --  Circumflex: Normal.  RCA:   --  Mid RCA: There was a 40 % stenosis.  --  Distal RCA: There was a 50 % stenosis.  COMPLICATIONS: There were no complications.  DIAGNOSTIC RECOMMENDATIONS: The patient should continue with the present  medications.  Prepared and signed by  Jairon Holguin M.D.  Signed 2017 12:20:13  HEMODYNAMIC TABLES  Pressures:  Baseline/ Room Air  Pressures:  - HR: 78  Pressures:  - Rhythm:  Pressures:  -- Aortic Pressure (S/D/M): --/--/99  Pressures:  -- Left Ventricle (s/edp): 157/39/--  Outputs:  Baseline/ Room Air  Outputs:  -- CALCULATIONS[age]  Outputs:  -- CALCULATIONS: Body Surface Area: 2.05  Outputs:  -- CALCULATIONS: Height in cm: 175.00  Outputs:  -- CALCULATIONS: Sex: Male  Outputs:  -- CALCULATIONS: Weight in k.40 (17 @ 21:55)    ======================================================  PAST MEDICAL & SURGICAL HISTORY:  HTN (hypertension)      CAD (coronary artery disease)  ; stent      Intracranial hemorrhage        Respiratory arrest        Myocardial infarction, unspecified MI type, unspecified artery      History of coronary artery stent placement  ====================ASSESSMENT ==============  58 male with HTN, CAD (s/p PCI ), HFrEF, CVA , and T2DM presenting with chest pressure and unknown tachycardia that was shocked x1, [facility]  found to have in-stent restenosis of pLAD and  of RCA with elevated RA and PA pressures and severely decreased. Admitted to CICU for management of cardiogenic shock and ADHF requiring IABP  -, with hospital course c/b vfib arrest requiring reinsertion of IABP. Not recommended for ATs per HF. Currently listed for transplant status 2.    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 AT evaluation launched 11/10, he is ABO A. He was discussed during TSC on  and was approved for listing, however was found to be Bacteremic with  Blc Cx growing mixed cultures Staph epi and Enterococcus faecalis and started on IV Vanco. Repeat cultures from  reveal NGTD and therefore was cleared by ID for listing.     He appears adequately supported on IABP at 1:1, electrically quiescent on oral Amiodarone. Cr is improving with holding diuretics. Labs otherwise notable for worsening thrombocytopenia. Awaiting suitable donor. Now with GM + rods in blood culture on  (reported on ), restarted on vancomycin, and status 7, repeat cultures now negative x48 hours (), now status 2 again.       ====================== NEUROLOGY=====================  Anxiety  - No Seroquel/antipsychotics since vfib arrest and prolonged QTC  - Psych Eval, recommended SSRI, but pt. refused   - Psych recommending atarax PRN  - Continue to monitor mental status    PT/Conditioning  - Continue band exercises while on bedrest s/t IABP    ==================== RESPIRATORY======================  Acute Hypoxemic Respiratory Failure  - s/p x2 intubations for cardiogenic pulm edema and the in setting of cardiac arrest, resolved - extubated 11/10  - Currently maintaining >95% sats on room air  - Continue incentive spirometry and monitoring of sp02    Asthma  - c/w albuterol, symbicort and spiriva  - On trelegy at home  - Continue to monitor SpO2 with goal >94%    ====================CARDIOVASCULAR==================  Vfib arrest i/s/o ischemia  - Lido gtt off   - PO Amio load - total of 5g per EP complete   - Amio dc'd  for rising LFTs  - Keep K > 4, Mag > 2.2     Cardiogenic shock requiring IABP (- , -)  - Likely 2/2 NSTEMI and ADHF  -  LHC: pLAD 100 % in-stent restenosis & mRCA, 100 %. PCWP 30. IABP placed.  -  TTE: LV dilated. EF 32 %. Regional WMAs present, mod (grade 2) LV diastolic dysfunction  -  TTE: EF 22% and + LV thrombus  - IABP swapped  to RFA, continue 1:1 support - switched sensor to R radial a-line  due to poor sensing on groin line  - Off Milrinone gtt @ 7:30 am   - James d/c'd due to elevated K levels  - c/w coreg 25 BID for GDMT  - UNOS status 2 as of   - hydralazine 50 TID and ISD 30 TID for AL reduction    NSTEMI iso stent re-occlusion of pLAD and 100%  of RCA  - EKG on admission w/ LBBB  - DAPT: c/w ASA, Brilinta d/c'd per transplant w/u  - c/w lipitor 80  - cMR deferred given necessity of IABP  - CT sx not recommending CABG, undergoing AT eval    LV thrombus  - c/w heparin gtt w/ therapeutic PTT    ===================== RENAL =========================  Non-oliguric ARIC, resolved   - Baseline Cr: 1-  - Renal US: no evidence of renal artery stenosis  - Trend BMP, lytes daily, replace as needed  - Continue Strict I/Os, avoid nephrotoxins    Mild Hyponatremia/Hyperkalemia iso ARIC  - Continue fluid restriction   - Urea powder d/c'd per renal  - Trend daily  - Continue monitoring urine output, lytes, SCr/ BUN  - Replete lytes prn with goal K >4 and Mg >2    =============== GASTROINTESTINAL===================  Constipation/ileus, resolved  - regular diet  - bowel reg prn    ===================ENDO====================  Type 2 DM  - A1c 8.3   - Continue lantus, premeal, low ISS  - continue FS    ===================HEMATOLOGIC/ONC ===================  - H/H & plts stable  - Monitor H/H and plts  - VTE PPX: heparin gtt    ==================INFECTIOUS DISEASE================  # Positive blood culture, resolved  - Repeat BCx drawn  for leukocytosis to 12k - positive on , G+ rods in anaerobic bottle, suspect contaminant  - Repeat cultures x2 sent  per transplant ID recs - no growth to date  - Vancomycin by trough (-)  - Final microbial ID: Cutibacterium acnes, per ID this is likely to be a skin contaminant, vanc dc'd.   - Dental eval  to rule out infectious etiology that would have led to bacteremia, no significant findings on exam.     # Enterococcus faecalis bacteremia, resolved  - BCx + for enterococcus faecalis x2, Staph epi x1 (likely contaminant)- pan sensitive   - Urine cx  + enterococcus faecalis  - BCx  no growth   - IABP site swapped to RFA   - s/p Vancomycin 1g q12h (-)  - CT A/P negative for infectious pathology    # COVID, resolved  - Off airborne precautions     # Pre-transplant ID w/u   - Trend ID recs for serologies   - Colonoscopy  - normal   - Chest CT  - improved LLL aeration  - s/p immunizations    ==================DERMATOLOGY================  # Upper Extremity Rash  - Patient developed bilateral upper extremity rash after receiving Hepatitis A & B immunizations on   - Dermatology consulted 12/5 - not likely to be related to vanco  - Recommend clobetasol 0.05% BID to affected areas   - RVP repeated to rule out viral etiology, negative      Patient requires continuous monitoring with bedside rhythm monitoring, pulse ox monitoring, and intermittent blood gas analysis. Care plan discussed with ICU care team. Patient remained critical and at risk for life threatening decompensation.  Patient seen, examined and plan discussed with CCU team during rounds.     I have personally provided ____ minutes of critical care time excluding time spent on separate procedures, in addition to initial critical care time provided by the CICU Attending, Dr. Durand.    By signing my name below, I, Kalyn Sousa, attest that this documentation has been prepared under the direction and in the presence of Rita Hawthorne NP.  Electronically signed: Rosalba Booth, 23 @ 20:27    I, Rita Hawthorne , personally performed the services described in this documentation. all medical record entries made by the scribe were at my direction and in my presence. I have reviewed the chart and agree that the record reflects my personal performance and is accurate and complete  Electronically signed: Rita Hawthorne NP.       DEVORAH VALENCIA  MRN-[id number]  Patient is a 58y old  Male who presents with a chief complaint of pre heart transplant evaluation (18 Dec 2023 19:26)    HPI:  pt seen and approx 1:30 pm  in [facility]    59yo M w/ hx HTN, CAD w/ 1 stent in , ICH () presenting with abn ekg. Patient presented to [facility] where he was found to have STEMI, recommended to get cath however patient did not want to get it there so it left and came here.  Patient initially had cough, congestion, fever, was placed on antibiotics on .  Started feeling nauseous and had a presyncopal event after which he presented to ED last night.  Had chest pain as well.  Chest pain is midsternal.  Not currently having chest pain.  Received 4 aspirin 30 min pta. (2023 15:11)      Hospital Course:    24 HOUR EVENTS:    REVIEW OF SYSTEMS:    CONSTITUTIONAL: No weakness, fevers or chills  EYES/ENT: No visual changes;  No vertigo or throat pain   NECK: No pain or stiffness  RESPIRATORY: No cough, wheezing, hemoptysis; No shortness of breath  CARDIOVASCULAR: No chest pain or palpitations  GASTROINTESTINAL: No abdominal or epigastric pain. No nausea, vomiting, or hematemesis; No diarrhea or constipation. No melena or hematochezia.  GENITOURINARY: No dysuria, frequency or hematuria  NEUROLOGICAL: No numbness or weakness  SKIN: No itching, rashes      ICU Vital Signs Last 24 Hrs  T(C): 36.8 (18 Dec 2023 19:00), Max: 36.8 (18 Dec 2023 19:00)  T(F): 98.2 (18 Dec 2023 19:00), Max: 98.2 (18 Dec 2023 19:00)  HR: 79 (18 Dec 2023 19:00) (73 - 89)  BP: --  BP(mean): --  ABP: --  ABP(mean): --  RR: 14 (18 Dec 2023 19:00) (14 - 23)  SpO2: 99% (18 Dec 2023 19:00) (97% - 100%)    O2 Parameters below as of 18 Dec 2023 19:00  Patient On (Oxygen Delivery Method): room air            CVP(mm Hg): --  CO: --  CI: --  PA: --  PA(mean): --  PA(direct): --  PCWP: --  LA: --  RA: --  SVR: --  SVRI: --  PVR: --  PVRI: --  I&O's Summary    17 Dec 2023 07:01  -  18 Dec 2023 07:00  --------------------------------------------------------  IN: 1136 mL / OUT: 2075 mL / NET: -939 mL    18 Dec 2023 07:01  -  18 Dec 2023 20:27  --------------------------------------------------------  IN: 632 mL / OUT: 600 mL / NET: 32 mL        CAPILLARY BLOOD GLUCOSE    CAPILLARY BLOOD GLUCOSE      POCT Blood Glucose.: 201 mg/dL (18 Dec 2023 18:13)      PHYSICAL EXAM:  GENERAL: No acute distress, well-developed  HEAD:  Atraumatic, Normocephalic  EYES: EOMI, PERRLA, conjunctiva and sclera clear  NECK: Supple, no lymphadenopathy, no JVD  CHEST/LUNG: CTAB; No wheezes, rales, or rhonchi  HEART: Regular rate and rhythm. Normal S1/S2. No murmurs, rubs, or gallops  ABDOMEN: Soft, non-tender, non-distended; normal bowel sounds, no organomegaly  EXTREMITIES:  2+ peripheral pulses b/l, No clubbing, cyanosis, or edema  NEUROLOGY: A&O x 3, no focal deficits  SKIN: No rashes or lesions    ============================I/O===========================   I&O's Detail    17 Dec 2023 07:01  -  18 Dec 2023 07:00  --------------------------------------------------------  IN:    Heparin: 336 mL    Oral Fluid: 800 mL  Total IN: 1136 mL    OUT:    Voided (mL): 2075 mL  Total OUT: 2075 mL    Total NET: -939 mL      18 Dec 2023 07:01  -  18 Dec 2023 20:27  --------------------------------------------------------  IN:    Heparin: 182 mL    Oral Fluid: 450 mL  Total IN: 632 mL    OUT:    Voided (mL): 600 mL  Total OUT: 600 mL    Total NET: 32 mL        ============================ LABS =========================                        11.7   7.53  )-----------( 179      ( 17 Dec 2023 01:14 )             35.5     -    134<L>  |  103  |  35<H>  ----------------------------<  138<H>  4.2   |  19<L>  |  1.16    Ca    9.7      17 Dec 2023 01:14  Phos  3.7       Mg     1.8         TPro  6.9  /  Alb  3.9  /  TBili  0.2  /  DBili  x   /  AST  23  /  ALT  84<H>  /  AlkPhos  61  -                LIVER FUNCTIONS - ( 17 Dec 2023 01:14 )  Alb: 3.9 g/dL / Pro: 6.9 g/dL / ALK PHOS: 61 U/L / ALT: 84 U/L / AST: 23 U/L / GGT: x           PT/INR - ( 17 Dec 2023 01:14 )   PT: 11.5 sec;   INR: 1.10 ratio         PTT - ( 17 Dec 2023 01:14 )  PTT:71.7 sec    Lactate, Blood: 0.9 mmol/L (23 @ 01:14)    Urinalysis Basic - ( 17 Dec 2023 01:14 )    Color: x / Appearance: x / SG: x / pH: x  Gluc: 138 mg/dL / Ketone: x  / Bili: x / Urobili: x   Blood: x / Protein: x / Nitrite: x   Leuk Esterase: x / RBC: x / WBC x   Sq Epi: x / Non Sq Epi: x / Bacteria: x      ======================Micro/Rad/Cardio=================  Telemtry: Reviewed   EKG: Reviewed  CXR: Reviewed  Culture: Reviewed   Echo:   Cath: Cardiac Cath Lab - Adult:   [facility]  Department of Cardiology  [address] [place] [address]  [phone]  Cath Lab Report -- Comprehensive Report  Patient: LD VALENCIA  Study date: 2017  Account number: [id number]  MR number: [id number]  : 1965  Gender: Male  Race: W  Case Physician(s):  Jairon Holguin M.D.  Referring Physician:  Luc Lynn M.D.  INDICATIONS: Unstable angina - CCS4.  HISTORY: The patient has a history of coronary artery disease. The patient  hashypertension and medication-treated dyslipidemia.  PROCEDURE:  --  Left heart catheterization with ventriculography.  --  Left coronary angiography.  --  Right coronary angiography.  TECHNIQUE: The risks and alternatives of the procedures and conscious  sedation were explained to the patient and informed consent was obtained.  Cardiac catheterization performed electively.  Local anesthetic given. Right radial artery access. A 6FR PRELUDE KIT was  inserted in the vessel. Left heart catheterization. Ventriculography was  performed. A 5FR FR4.0 EXPO catheter was utilized. Left coronary artery  angiography. The vessel was injected utilizing a 5FR FL3.5 EXPO catheter.  Right coronary artery angiography. The vessel was injected utilizing a 5FR  FR4.0 EXPO catheter. RADIATION EXPOSURE: 1.1 min.  CONTRAST GIVEN: Omnipaque 55 ml.  MEDICATIONS GIVEN: Midazolam, 1 mg, IV. Fentanyl, 25 mcg, IV. Verapamil  (Isoptin, Calan, Covera), 2.5 mg, IA. Heparin, 3000 units, IA.  VENTRICLES: Global left ventricular function was moderately depressed. EF  estimated was 40 %.  CORONARY VESSELS: The coronary circulation is right dominant.  LM:   --  LM: Normal.  LAD:   --  Proximal LAD: There was a 50 % stenosis.  CX:   --  Circumflex: Normal.  RCA:   --  Mid RCA: There was a 40 % stenosis.  --  Distal RCA: There was a 50 % stenosis.  COMPLICATIONS: There were no complications.  DIAGNOSTIC RECOMMENDATIONS: The patient should continue with the present  medications.  Prepared and signed by  Jairon Holguin M.D.  Signed 2017 12:20:13  HEMODYNAMIC TABLES  Pressures:  Baseline/ Room Air  Pressures:  - HR: 78  Pressures:  - Rhythm:  Pressures:  -- Aortic Pressure (S/D/M): --/--/99  Pressures:  -- Left Ventricle (s/edp): 157/39/--  Outputs:  Baseline/ Room Air  Outputs:  -- CALCULATIONS[age]  Outputs:  -- CALCULATIONS: Body Surface Area: 2.05  Outputs:  -- CALCULATIONS: Height in cm: 175.00  Outputs:  -- CALCULATIONS: Sex: Male  Outputs:  -- CALCULATIONS: Weight in k.40 (17 @ 21:55)    ======================================================  PAST MEDICAL & SURGICAL HISTORY:  HTN (hypertension)      CAD (coronary artery disease)  ; stent      Intracranial hemorrhage        Respiratory arrest        Myocardial infarction, unspecified MI type, unspecified artery      History of coronary artery stent placement  ====================ASSESSMENT ==============  58 male with HTN, CAD (s/p PCI ), HFrEF, CVA , and T2DM presenting with chest pressure and unknown tachycardia that was shocked x1, [facility]  found to have in-stent restenosis of pLAD and  of RCA with elevated RA and PA pressures and severely decreased. Admitted to CICU for management of cardiogenic shock and ADHF requiring IABP  -, with hospital course c/b vfib arrest requiring reinsertion of IABP. Not recommended for ATs per HF. Currently listed for transplant status 2.    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 AT evaluation launched 11/10, he is ABO A. He was discussed during TSC on  and was approved for listing, however was found to be Bacteremic with  Blc Cx growing mixed cultures Staph epi and Enterococcus faecalis and started on IV Vanco. Repeat cultures from  reveal NGTD and therefore was cleared by ID for listing.     He appears adequately supported on IABP at 1:1, electrically quiescent on oral Amiodarone. Cr is improving with holding diuretics. Labs otherwise notable for worsening thrombocytopenia. Awaiting suitable donor. Now with GM + rods in blood culture on  (reported on ), restarted on vancomycin, and status 7, repeat cultures now negative x48 hours (), now status 2 again.       ====================== NEUROLOGY=====================  Anxiety  - No Seroquel/antipsychotics since vfib arrest and prolonged QTC  - Psych Eval, recommended SSRI, but pt. refused   - Psych recommending atarax PRN  - Continue to monitor mental status    PT/Conditioning  - Continue band exercises while on bedrest s/t IABP    ==================== RESPIRATORY======================  Acute Hypoxemic Respiratory Failure  - s/p x2 intubations for cardiogenic pulm edema and the in setting of cardiac arrest, resolved - extubated 11/10  - Currently maintaining >95% sats on room air  - Continue incentive spirometry and monitoring of sp02    Asthma  - c/w albuterol, symbicort and spiriva  - On trelegy at home  - Continue to monitor SpO2 with goal >94%    ====================CARDIOVASCULAR==================  Vfib arrest i/s/o ischemia  - Lido gtt off   - PO Amio load - total of 5g per EP complete   - Amio dc'd  for rising LFTs  - Keep K > 4, Mag > 2.2     Cardiogenic shock requiring IABP (- , -)  - Likely 2/2 NSTEMI and ADHF  -  LHC: pLAD 100 % in-stent restenosis & mRCA, 100 %. PCWP 30. IABP placed.  -  TTE: LV dilated. EF 32 %. Regional WMAs present, mod (grade 2) LV diastolic dysfunction  -  TTE: EF 22% and + LV thrombus  - IABP swapped  to RFA, continue 1:1 support - switched sensor to R radial a-line  due to poor sensing on groin line  - Off Milrinone gtt @ 7:30 am   - James d/c'd due to elevated K levels  - c/w coreg 25 BID for GDMT  - UNOS status 2 as of   - hydralazine 50 TID and ISD 30 TID for AL reduction    NSTEMI iso stent re-occlusion of pLAD and 100%  of RCA  - EKG on admission w/ LBBB  - DAPT: c/w ASA, Brilinta d/c'd per transplant w/u  - c/w lipitor 80  - cMR deferred given necessity of IABP  - CT sx not recommending CABG, undergoing AT eval    LV thrombus  - c/w heparin gtt w/ therapeutic PTT    ===================== RENAL =========================  Non-oliguric ARIC, resolved   - Baseline Cr: 1-  - Renal US: no evidence of renal artery stenosis  - Trend BMP, lytes daily, replace as needed  - Continue Strict I/Os, avoid nephrotoxins    Mild Hyponatremia/Hyperkalemia iso ARIC  - Continue fluid restriction   - Urea powder d/c'd per renal  - Trend daily  - Continue monitoring urine output, lytes, SCr/ BUN  - Replete lytes prn with goal K >4 and Mg >2    =============== GASTROINTESTINAL===================  Constipation/ileus, resolved  - regular diet  - bowel reg prn    ===================ENDO====================  Type 2 DM  - A1c 8.3   - Continue lantus, premeal, low ISS  - continue FS    ===================HEMATOLOGIC/ONC ===================  - H/H & plts stable  - Monitor H/H and plts  - VTE PPX: heparin gtt    ==================INFECTIOUS DISEASE================  # Positive blood culture, resolved  - Repeat BCx drawn  for leukocytosis to 12k - positive on , G+ rods in anaerobic bottle, suspect contaminant  - Repeat cultures x2 sent  per transplant ID recs - no growth to date  - Vancomycin by trough (-)  - Final microbial ID: Cutibacterium acnes, per ID this is likely to be a skin contaminant, vanc dc'd.   - Dental eval  to rule out infectious etiology that would have led to bacteremia, no significant findings on exam.     # Enterococcus faecalis bacteremia, resolved  - BCx + for enterococcus faecalis x2, Staph epi x1 (likely contaminant)- pan sensitive   - Urine cx  + enterococcus faecalis  - BCx  no growth   - IABP site swapped to RFA   - s/p Vancomycin 1g q12h (-)  - CT A/P negative for infectious pathology    # COVID, resolved  - Off airborne precautions     # Pre-transplant ID w/u   - Trend ID recs for serologies   - Colonoscopy  - normal   - Chest CT  - improved LLL aeration  - s/p immunizations    ==================DERMATOLOGY================  # Upper Extremity Rash  - Patient developed bilateral upper extremity rash after receiving Hepatitis A & B immunizations on   - Dermatology consulted 12/5 - not likely to be related to vanco  - Recommend clobetasol 0.05% BID to affected areas   - RVP repeated to rule out viral etiology, negative      Patient requires continuous monitoring with bedside rhythm monitoring, pulse ox monitoring, and intermittent blood gas analysis. Care plan discussed with ICU care team. Patient remained critical and at risk for life threatening decompensation.  Patient seen, examined and plan discussed with CCU team during rounds.     I have personally provided ____ minutes of critical care time excluding time spent on separate procedures, in addition to initial critical care time provided by the CICU Attending, Dr. Durand.    By signing my name below, I, Kalyn Sousa, attest that this documentation has been prepared under the direction and in the presence of Rita Hawthorne NP.  Electronically signed: Rosalba Booth, 23 @ 20:27    I, Rita Hawthorne , personally performed the services described in this documentation. all medical record entries made by the scribe were at my direction and in my presence. I have reviewed the chart and agree that the record reflects my personal performance and is accurate and complete  Electronically signed: Rita Hawthorne NP.       DEVORAH VALENCIA  MRN-[id number]  Patient is a 58y old  Male who presents with a chief complaint of pre heart transplant evaluation (18 Dec 2023 19:26)    HPI: 58M w/ hx HTN, CAD w/ 1 stent in , ICH () presenting with abn ekg. Patient presented to [facility] where he was found to have STEMI, recommended to get cath however patient did not want to get it there so it left and came here.  Patient initially had cough, congestion, fever, was placed on antibiotics on .  Started feeling nauseous and had a presyncopal event after which he presented to ED last night.  Had chest pain as well.  Chest pain is midsternal.  Not currently having chest pain.  Received 4 aspirin 30 min pta. (2023 15:11)    REVIEW OF SYSTEMS:  CONSTITUTIONAL: No weakness, fevers or chills  EYES/ENT: No visual changes;  No vertigo or throat pain   NECK: No pain or stiffness  RESPIRATORY: No cough, wheezing, hemoptysis; No shortness of breath  CARDIOVASCULAR: No chest pain or palpitations  GASTROINTESTINAL: No abdominal or epigastric pain. No nausea, vomiting, or hematemesis; No diarrhea or constipation. No melena or hematochezia.  GENITOURINARY: No dysuria, frequency or hematuria  NEUROLOGICAL: No numbness or weakness  SKIN: No itching, rashes    ICU Vital Signs Last 24 Hrs  T(C): 36.8 (18 Dec 2023 19:00), Max: 36.8 (18 Dec 2023 19:00)  T(F): 98.2 (18 Dec 2023 19:00), Max: 98.2 (18 Dec 2023 19:00)  HR: 79 (18 Dec 2023 19:00) (73 - 89)  RR: 14 (18 Dec 2023 19:00) (14 - 23)  SpO2: 99% (18 Dec 2023 19:00) (97% - 100%)    O2 Parameters below as of 18 Dec 2023 19:00  Patient On (Oxygen Delivery Method): room air    I&O's Summary    17 Dec 2023 07:01  -  18 Dec 2023 07:00  --------------------------------------------------------  IN: 1136 mL / OUT: 2075 mL / NET: -939 mL    18 Dec 2023 07:01  -  18 Dec 2023 20:27  --------------------------------------------------------  IN: 632 mL / OUT: 600 mL / NET: 32 mL      CAPILLARY BLOOD GLUCOSE  POCT Blood Glucose.: 201 mg/dL (18 Dec 2023 18:13)  ============================I/O===========================   I&O's Detail    17 Dec 2023 07:01  -  18 Dec 2023 07:00  --------------------------------------------------------  IN:    Heparin: 336 mL    Oral Fluid: 800 mL  Total IN: 1136 mL    OUT:    Voided (mL): 2075 mL  Total OUT: 2075 mL    Total NET: -939 mL      18 Dec 2023 07:01  -  18 Dec 2023 20:27  --------------------------------------------------------  IN:    Heparin: 182 mL    Oral Fluid: 450 mL  Total IN: 632 mL    OUT:    Voided (mL): 600 mL  Total OUT: 600 mL    Total NET: 32 mL  ============================ LABS =========================                    11.7   7.53  )-----------( 179      ( 17 Dec 2023 01:14 )             35.5         134<L>  |  103  |  35<H>  ----------------------------<  138<H>  4.2   |  19<L>  |  1.16    Ca    9.7      17 Dec 2023 01:14  Phos  3.7       Mg     1.8         TPro  6.9  /  Alb  3.9  /  TBili  0.2  /  DBili  x   /  AST  23  /  ALT  84<H>  /  AlkPhos  61      LIVER FUNCTIONS - ( 17 Dec 2023 01:14 )  Alb: 3.9 g/dL / Pro: 6.9 g/dL / ALK PHOS: 61 U/L / ALT: 84 U/L / AST: 23 U/L / GGT: x           PT/INR - ( 17 Dec 2023 01:14 )   PT: 11.5 sec;   INR: 1.10 ratio    PTT - ( 17 Dec 2023 01:14 )  PTT:71.7 sec    Lactate, Blood: 0.9 mmol/L (23 @ 01:14)    Urinalysis Basic - ( 17 Dec 2023 01:14 )    Color: x / Appearance: x / SG: x / pH: x  Gluc: 138 mg/dL / Ketone: x  / Bili: x / Urobili: x   Blood: x / Protein: x / Nitrite: x   Leuk Esterase: x / RBC: x / WBC x   Sq Epi: x / Non Sq Epi: x / Bacteria: x  ======================Micro/Rad/Cardio=================  Telemtry: Reviewed   EKG: Reviewed  CXR: Reviewed  Culture: Reviewed   Echo:   Cath: Cardiac Cath Lab - Adult:   [facility]  Department of Cardiology  [address] [place] [address]  [phone]  Cath Lab Report -- Comprehensive Report  Patient: LD VALENCIA  Study date: 2017  Account number: [id number]  MR number: [id number]  : 1965  Gender: Male  Race: W  Case Physician(s):  Jairon Holguin, M.D.  Referring Physician:  Luc Lynn M.D.  INDICATIONS: Unstable angina - CCS4.  HISTORY: The patient has a history of coronary artery disease. The patient  hashypertension and medication-treated dyslipidemia.  PROCEDURE:  --  Left heart catheterization with ventriculography.  --  Left coronary angiography.  --  Right coronary angiography.  TECHNIQUE: The risks and alternatives of the procedures and conscious  sedation were explained to the patient and informed consent was obtained.  Cardiac catheterization performed electively.  Local anesthetic given. Right radial artery access. A 6FR PRELUDE KIT was  inserted in the vessel. Left heart catheterization. Ventriculography was  performed. A 5FR FR4.0 EXPO catheter was utilized. Left coronary artery  angiography. The vessel was injected utilizing a 5FR FL3.5 EXPO catheter.  Right coronary artery angiography. The vessel was injected utilizing a 5FR  FR4.0 EXPO catheter. RADIATION EXPOSURE: 1.1 min.  CONTRAST GIVEN: Omnipaque 55 ml.  MEDICATIONS GIVEN: Midazolam, 1 mg, IV. Fentanyl, 25 mcg, IV. Verapamil  (Isoptin, Calan, Covera), 2.5 mg, IA. Heparin, 3000 units, IA.  VENTRICLES: Global left ventricular function was moderately depressed. EF  estimated was 40 %.  CORONARY VESSELS: The coronary circulation is right dominant.  LM:   --  LM: Normal.  LAD:   --  Proximal LAD: There was a 50 % stenosis.  CX:   --  Circumflex: Normal.  RCA:   --  Mid RCA: There was a 40 % stenosis.  --  Distal RCA: There was a 50 % stenosis.  COMPLICATIONS: There were no complications.  DIAGNOSTIC RECOMMENDATIONS: The patient should continue with the present  medications.  Prepared and signed by  Jairon Holguin M.D.  Signed 2017 12:20:13  HEMODYNAMIC TABLES  Pressures:  Baseline/ Room Air  Pressures:  - HR: 78  Pressures:  - Rhythm:  Pressures:  -- Aortic Pressure (S/D/M): --/--/99  Pressures:  -- Left Ventricle (s/edp): 157/39/--  Outputs:  Baseline/ Room Air  Outputs:  -- CALCULATIONS[age]  Outputs:  -- CALCULATIONS: Body Surface Area: 2.05  Outputs:  -- CALCULATIONS: Height in cm: 175.00  Outputs:  -- CALCULATIONS: Sex: Male  Outputs:  -- CALCULATIONS: Weight in k.40 (01-27-17 @ 21:55)  ======================================================  PAST MEDICAL & SURGICAL HISTORY:  HTN (hypertension)      CAD (coronary artery disease)  ; stent      Intracranial hemorrhage        Respiratory arrest        Myocardial infarction, unspecified MI type, unspecified artery      History of coronary artery stent placement    ====================ASSESSMENT ==============  58 male with HTN, CAD (s/p PCI ), HFrEF, CVA , and T2DM presenting with chest pressure and unknown tachycardia that was shocked x1, [facility]  found to have in-stent restenosis of pLAD and  of RCA with elevated RA and PA pressures and severely decreased. Admitted to CICU for management of cardiogenic shock and ADHF requiring IABP  -, with hospital course c/b vfib arrest requiring reinsertion of IABP. Not recommended for ATs per HF. Currently listed for transplant status 2.    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 AT evaluation launched 11/10, he is ABO A. He was discussed during TSC on  and was approved for listing, however was found to be Bacteremic with  Blc Cx growing mixed cultures Staph epi and Enterococcus faecalis and started on IV Vanco. Repeat cultures from  reveal NGTD and therefore was cleared by ID for listing.     He appears adequately supported on IABP at 1:1, electrically quiescent on oral Amiodarone. Cr is improving with holding diuretics. Labs otherwise notable for worsening thrombocytopenia. Awaiting suitable donor. Now with GM + rods in blood culture on  (reported on ), restarted on vancomycin, and status 7, repeat cultures now negative x48 hours (), now status 2 again.       ====================== NEUROLOGY=====================  Anxiety  - No Seroquel/antipsychotics since vfib arrest and prolonged QTC  - Psych Eval, recommended SSRI, but pt. refused   - Psych recommending atarax PRN  - Continue to monitor mental status    PT/Conditioning  - Continue band exercises while on bedrest s/t IABP    ==================== RESPIRATORY======================  Acute Hypoxemic Respiratory Failure  - s/p x2 intubations for cardiogenic pulm edema and the in setting of cardiac arrest, resolved - extubated 11/10  - Currently maintaining >95% sats on room air  - Continue incentive spirometry and monitoring of sp02    Asthma  - c/w albuterol, symbicort and spiriva  - On trelegy at home  - Continue to monitor SpO2 with goal >94%    ====================CARDIOVASCULAR==================  Vfib arrest i/s/o ischemia  - Lido gtt off   - PO Amio load - total of 5g per EP complete   - Amio dc'd  for rising LFTs  - Keep K > 4, Mag > 2.2     Cardiogenic shock requiring IABP (- , -)  - Likely 2/2 NSTEMI and ADHF  -  LHC: pLAD 100 % in-stent restenosis & mRCA, 100 %. PCWP 30. IABP placed.  -  TTE: LV dilated. EF 32 %. Regional WMAs present, mod (grade 2) LV diastolic dysfunction  -  TTE: EF 22% and + LV thrombus  - IABP swapped  to RFA, continue 1:1 support - switched sensor to R radial a-line  due to poor sensing on groin line  - Off Milrinone gtt @ 7:30 am   - James d/c'd due to elevated K levels  - c/w coreg 25 BID for GDMT  - UNOS status 2 as of   - hydralazine 50 TID and ISD 30 TID for AL reduction    NSTEMI iso stent re-occlusion of pLAD and 100%  of RCA  - EKG on admission w/ LBBB  - DAPT: c/w ASA, Brilinta d/c'd per transplant w/u  - c/w lipitor 80  - cMR deferred given necessity of IABP  - CT sx not recommending CABG, undergoing AT eval    LV thrombus  - c/w heparin gtt w/ therapeutic PTT    ===================== RENAL =========================  Non-oliguric ARIC, resolved   - Baseline Cr: -  - Renal US: no evidence of renal artery stenosis  - Trend BMP, lytes daily, replace as needed  - Continue Strict I/Os, avoid nephrotoxins    Mild Hyponatremia/Hyperkalemia iso ARIC  - Continue fluid restriction   - Urea powder d/c'd per renal  - Trend daily  - Continue monitoring urine output, lytes, SCr/ BUN  - Replete lytes prn with goal K >4 and Mg >2    =============== GASTROINTESTINAL===================  Constipation/ileus, resolved  - regular diet  - bowel reg prn    ===================ENDO====================  Type 2 DM  - A1c 8.3   - Continue lantus, premeal, low ISS  - continue FS    ===================HEMATOLOGIC/ONC ===================  - H/H & plts stable  - Monitor H/H and plts  - VTE PPX: heparin gtt    ==================INFECTIOUS DISEASE================  # Positive blood culture, resolved  - Repeat BCx drawn  for leukocytosis to 12k - positive on , G+ rods in anaerobic bottle, suspect contaminant  - Repeat cultures x2 sent  per transplant ID recs - no growth to date  - Vancomycin by trough (-)  - Final microbial ID: Cutibacterium acnes, per ID this is likely to be a skin contaminant, vanc dc'd.   - Dental eval  to rule out infectious etiology that would have led to bacteremia, no significant findings on exam.     # Enterococcus faecalis bacteremia, resolved  - BCx + for enterococcus faecalis x2, Staph epi x1 (likely contaminant)- pan sensitive   - Urine cx  + enterococcus faecalis  - BCx  no growth   - IABP site swapped to RFA   - s/p Vancomycin 1g q12h (-)  - CT A/P negative for infectious pathology    # COVID, resolved  - Off airborne precautions     # Pre-transplant ID w/u   - Trend ID recs for serologies   - Colonoscopy  - normal   - Chest CT  - improved LLL aeration  - s/p immunizations    ==================DERMATOLOGY================  # Upper Extremity Rash  - Patient developed bilateral upper extremity rash after receiving Hepatitis A & B immunizations on   - Dermatology consulted  - not likely to be related to vanco  - Recommend clobetasol 0.05% BID to affected areas   - RVP repeated to rule out viral etiology, negative      Patient requires continuous monitoring with bedside rhythm monitoring, pulse ox monitoring, and intermittent blood gas analysis. Care plan discussed with ICU care team. Patient remained critical and at risk for life threatening decompensation.  Patient seen, examined and plan discussed with CCU team during rounds.     I have personally provided 35 minutes of critical care time excluding time spent on separate procedures, in addition to initial critical care time provided by the CICU Attending, Dr. Durand.    By signing my name below, I, aKlyn Sousa, attest that this documentation has been prepared under the direction and in the presence of Rita Hawthorne NP.  Electronically signed: Rosalba Booth, 23 @ 20:27    I, Rita Hawthorne , personally performed the services described in this documentation. all medical record entries made by the scribe were at my direction and in my presence. I have reviewed the chart and agree that the record reflects my personal performance and is accurate and complete  Electronically signed: Rita Hawthorne NP   DEVORAH VALENCIA  MRN-[id number]  Patient is a 58y old  Male who presents with a chief complaint of pre heart transplant evaluation (18 Dec 2023 19:26)    HPI: 58M w/ hx HTN, CAD w/ 1 stent in , ICH () presenting with abn ekg. Patient presented to [facility] where he was found to have STEMI, recommended to get cath however patient did not want to get it there so it left and came here.  Patient initially had cough, congestion, fever, was placed on antibiotics on .  Started feeling nauseous and had a presyncopal event after which he presented to ED last night.  Had chest pain as well.  Chest pain is midsternal.  Not currently having chest pain.  Received 4 aspirin 30 min pta. (2023 15:11)    REVIEW OF SYSTEMS:  CONSTITUTIONAL: No weakness, fevers or chills  EYES/ENT: No visual changes;  No vertigo or throat pain   NECK: No pain or stiffness  RESPIRATORY: No cough, wheezing, hemoptysis; No shortness of breath  CARDIOVASCULAR: No chest pain or palpitations  GASTROINTESTINAL: No abdominal or epigastric pain. No nausea, vomiting, or hematemesis; No diarrhea or constipation. No melena or hematochezia.  GENITOURINARY: No dysuria, frequency or hematuria  NEUROLOGICAL: No numbness or weakness  SKIN: No itching, rashes    ICU Vital Signs Last 24 Hrs  T(C): 36.8 (18 Dec 2023 19:00), Max: 36.8 (18 Dec 2023 19:00)  T(F): 98.2 (18 Dec 2023 19:00), Max: 98.2 (18 Dec 2023 19:00)  HR: 79 (18 Dec 2023 19:00) (73 - 89)  RR: 14 (18 Dec 2023 19:00) (14 - 23)  SpO2: 99% (18 Dec 2023 19:00) (97% - 100%)    O2 Parameters below as of 18 Dec 2023 19:00  Patient On (Oxygen Delivery Method): room air    I&O's Summary    17 Dec 2023 07:01  -  18 Dec 2023 07:00  --------------------------------------------------------  IN: 1136 mL / OUT: 2075 mL / NET: -939 mL    18 Dec 2023 07:01  -  18 Dec 2023 20:27  --------------------------------------------------------  IN: 632 mL / OUT: 600 mL / NET: 32 mL      CAPILLARY BLOOD GLUCOSE  POCT Blood Glucose.: 201 mg/dL (18 Dec 2023 18:13)  ============================I/O===========================   I&O's Detail    17 Dec 2023 07:01  -  18 Dec 2023 07:00  --------------------------------------------------------  IN:    Heparin: 336 mL    Oral Fluid: 800 mL  Total IN: 1136 mL    OUT:    Voided (mL): 2075 mL  Total OUT: 2075 mL    Total NET: -939 mL      18 Dec 2023 07:01  -  18 Dec 2023 20:27  --------------------------------------------------------  IN:    Heparin: 182 mL    Oral Fluid: 450 mL  Total IN: 632 mL    OUT:    Voided (mL): 600 mL  Total OUT: 600 mL    Total NET: 32 mL  ============================ LABS =========================                    11.7   7.53  )-----------( 179      ( 17 Dec 2023 01:14 )             35.5         134<L>  |  103  |  35<H>  ----------------------------<  138<H>  4.2   |  19<L>  |  1.16    Ca    9.7      17 Dec 2023 01:14  Phos  3.7       Mg     1.8         TPro  6.9  /  Alb  3.9  /  TBili  0.2  /  DBili  x   /  AST  23  /  ALT  84<H>  /  AlkPhos  61      LIVER FUNCTIONS - ( 17 Dec 2023 01:14 )  Alb: 3.9 g/dL / Pro: 6.9 g/dL / ALK PHOS: 61 U/L / ALT: 84 U/L / AST: 23 U/L / GGT: x           PT/INR - ( 17 Dec 2023 01:14 )   PT: 11.5 sec;   INR: 1.10 ratio    PTT - ( 17 Dec 2023 01:14 )  PTT:71.7 sec    Lactate, Blood: 0.9 mmol/L (23 @ 01:14)    Urinalysis Basic - ( 17 Dec 2023 01:14 )    Color: x / Appearance: x / SG: x / pH: x  Gluc: 138 mg/dL / Ketone: x  / Bili: x / Urobili: x   Blood: x / Protein: x / Nitrite: x   Leuk Esterase: x / RBC: x / WBC x   Sq Epi: x / Non Sq Epi: x / Bacteria: x  ======================Micro/Rad/Cardio=================  Telemtry: Reviewed   EKG: Reviewed  CXR: Reviewed  Culture: Reviewed   Echo:   Cath: Cardiac Cath Lab - Adult:   [facility]  Department of Cardiology  [address] [place] [address]  [phone]  Cath Lab Report -- Comprehensive Report  Patient: LD VALENCIA  Study date: 2017  Account number: [id number]  MR number: [id number]  : 1965  Gender: Male  Race: W  Case Physician(s):  Jairon Holguin, M.D.  Referring Physician:  Luc Lynn M.D.  INDICATIONS: Unstable angina - CCS4.  HISTORY: The patient has a history of coronary artery disease. The patient  hashypertension and medication-treated dyslipidemia.  PROCEDURE:  --  Left heart catheterization with ventriculography.  --  Left coronary angiography.  --  Right coronary angiography.  TECHNIQUE: The risks and alternatives of the procedures and conscious  sedation were explained to the patient and informed consent was obtained.  Cardiac catheterization performed electively.  Local anesthetic given. Right radial artery access. A 6FR PRELUDE KIT was  inserted in the vessel. Left heart catheterization. Ventriculography was  performed. A 5FR FR4.0 EXPO catheter was utilized. Left coronary artery  angiography. The vessel was injected utilizing a 5FR FL3.5 EXPO catheter.  Right coronary artery angiography. The vessel was injected utilizing a 5FR  FR4.0 EXPO catheter. RADIATION EXPOSURE: 1.1 min.  CONTRAST GIVEN: Omnipaque 55 ml.  MEDICATIONS GIVEN: Midazolam, 1 mg, IV. Fentanyl, 25 mcg, IV. Verapamil  (Isoptin, Calan, Covera), 2.5 mg, IA. Heparin, 3000 units, IA.  VENTRICLES: Global left ventricular function was moderately depressed. EF  estimated was 40 %.  CORONARY VESSELS: The coronary circulation is right dominant.  LM:   --  LM: Normal.  LAD:   --  Proximal LAD: There was a 50 % stenosis.  CX:   --  Circumflex: Normal.  RCA:   --  Mid RCA: There was a 40 % stenosis.  --  Distal RCA: There was a 50 % stenosis.  COMPLICATIONS: There were no complications.  DIAGNOSTIC RECOMMENDATIONS: The patient should continue with the present  medications.  Prepared and signed by  Jairon Holguin M.D.  Signed 2017 12:20:13  HEMODYNAMIC TABLES  Pressures:  Baseline/ Room Air  Pressures:  - HR: 78  Pressures:  - Rhythm:  Pressures:  -- Aortic Pressure (S/D/M): --/--/99  Pressures:  -- Left Ventricle (s/edp): 157/39/--  Outputs:  Baseline/ Room Air  Outputs:  -- CALCULATIONS[age]  Outputs:  -- CALCULATIONS: Body Surface Area: 2.05  Outputs:  -- CALCULATIONS: Height in cm: 175.00  Outputs:  -- CALCULATIONS: Sex: Male  Outputs:  -- CALCULATIONS: Weight in k.40 (01-27-17 @ 21:55)  ======================================================  PAST MEDICAL & SURGICAL HISTORY:  HTN (hypertension)      CAD (coronary artery disease)  ; stent      Intracranial hemorrhage        Respiratory arrest        Myocardial infarction, unspecified MI type, unspecified artery      History of coronary artery stent placement    ====================ASSESSMENT ==============  58 male with HTN, CAD (s/p PCI ), HFrEF, CVA , and T2DM presenting with chest pressure and unknown tachycardia that was shocked x1, [facility]  found to have in-stent restenosis of pLAD and  of RCA with elevated RA and PA pressures and severely decreased. Admitted to CICU for management of cardiogenic shock and ADHF requiring IABP  -, with hospital course c/b vfib arrest requiring reinsertion of IABP. Not recommended for ATs per HF. Currently listed for transplant status 2.    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 AT evaluation launched 11/10, he is ABO A. He was discussed during TSC on  and was approved for listing, however was found to be Bacteremic with  Blc Cx growing mixed cultures Staph epi and Enterococcus faecalis and started on IV Vanco. Repeat cultures from  reveal NGTD and therefore was cleared by ID for listing.     He appears adequately supported on IABP at 1:1, electrically quiescent on oral Amiodarone. Cr is improving with holding diuretics. Labs otherwise notable for worsening thrombocytopenia. Awaiting suitable donor. Now with GM + rods in blood culture on  (reported on ), restarted on vancomycin, and status 7, repeat cultures now negative x48 hours (), now status 2 again.       ====================== NEUROLOGY=====================  Anxiety  - No Seroquel/antipsychotics since vfib arrest and prolonged QTC  - Psych Eval, recommended SSRI, but pt. refused   - Psych recommending atarax PRN  - Continue to monitor mental status    PT/Conditioning  - Continue band exercises while on bedrest s/t IABP    ==================== RESPIRATORY======================  Acute Hypoxemic Respiratory Failure  - s/p x2 intubations for cardiogenic pulm edema and the in setting of cardiac arrest, resolved - extubated 11/10  - Currently maintaining >95% sats on room air  - Continue incentive spirometry and monitoring of sp02    Asthma  - c/w albuterol, symbicort and spiriva  - On trelegy at home  - Continue to monitor SpO2 with goal >94%    ====================CARDIOVASCULAR==================  Vfib arrest i/s/o ischemia  - Lido gtt off   - PO Amio load - total of 5g per EP complete   - Amio dc'd  for rising LFTs  - Keep K > 4, Mag > 2.2     Cardiogenic shock requiring IABP (- , -)  - Likely 2/2 NSTEMI and ADHF  -  LHC: pLAD 100 % in-stent restenosis & mRCA, 100 %. PCWP 30. IABP placed.  -  TTE: LV dilated. EF 32 %. Regional WMAs present, mod (grade 2) LV diastolic dysfunction  -  TTE: EF 22% and + LV thrombus  - IABP swapped  to RFA, continue 1:1 support - switched sensor to R radial a-line  due to poor sensing on groin line  - Off Milrinone gtt @ 7:30 am   - James d/c'd due to elevated K levels  - c/w coreg 25 BID for GDMT  - UNOS status 2 as of   - hydralazine 50 TID and ISD 30 TID for AL reduction    NSTEMI iso stent re-occlusion of pLAD and 100%  of RCA  - EKG on admission w/ LBBB  - DAPT: c/w ASA, Brilinta d/c'd per transplant w/u  - c/w lipitor 80  - cMR deferred given necessity of IABP  - CT sx not recommending CABG, undergoing AT eval    LV thrombus  - c/w heparin gtt w/ therapeutic PTT    ===================== RENAL =========================  Non-oliguric ARIC, resolved   - Baseline Cr: -  - Renal US: no evidence of renal artery stenosis  - Trend BMP, lytes daily, replace as needed  - Continue Strict I/Os, avoid nephrotoxins    Mild Hyponatremia/Hyperkalemia iso ARIC  - Continue fluid restriction   - Urea powder d/c'd per renal  - Trend daily  - Continue monitoring urine output, lytes, SCr/ BUN  - Replete lytes prn with goal K >4 and Mg >2    =============== GASTROINTESTINAL===================  Constipation/ileus, resolved  - regular diet  - bowel reg prn    ===================ENDO====================  Type 2 DM  - A1c 8.3   - Continue lantus, premeal, low ISS  - continue FS    ===================HEMATOLOGIC/ONC ===================  - H/H & plts stable  - Monitor H/H and plts  - VTE PPX: heparin gtt    ==================INFECTIOUS DISEASE================  # Positive blood culture, resolved  - Repeat BCx drawn  for leukocytosis to 12k - positive on , G+ rods in anaerobic bottle, suspect contaminant  - Repeat cultures x2 sent  per transplant ID recs - no growth to date  - Vancomycin by trough (-)  - Final microbial ID: Cutibacterium acnes, per ID this is likely to be a skin contaminant, vanc dc'd.   - Dental eval  to rule out infectious etiology that would have led to bacteremia, no significant findings on exam.     # Enterococcus faecalis bacteremia, resolved  - BCx + for enterococcus faecalis x2, Staph epi x1 (likely contaminant)- pan sensitive   - Urine cx  + enterococcus faecalis  - BCx  no growth   - IABP site swapped to RFA   - s/p Vancomycin 1g q12h (-)  - CT A/P negative for infectious pathology    # COVID, resolved  - Off airborne precautions     # Pre-transplant ID w/u   - Trend ID recs for serologies   - Colonoscopy  - normal   - Chest CT  - improved LLL aeration  - s/p immunizations    ==================DERMATOLOGY================  # Upper Extremity Rash  - Patient developed bilateral upper extremity rash after receiving Hepatitis A & B immunizations on   - Dermatology consulted  - not likely to be related to vanco  - Recommend clobetasol 0.05% BID to affected areas   - RVP repeated to rule out viral etiology, negative      Patient requires continuous monitoring with bedside rhythm monitoring, pulse ox monitoring, and intermittent blood gas analysis. Care plan discussed with ICU care team. Patient remained critical and at risk for life threatening decompensation.  Patient seen, examined and plan discussed with CCU team during rounds.     I have personally provided 35 minutes of critical care time excluding time spent on separate procedures, in addition to initial critical care time provided by the CICU Attending, Dr. Durand.    By signing my name below, I, Kalyn Sousa, attest that this documentation has been prepared under the direction and in the presence of Rita Hawthorne NP.  Electronically signed: Rosalba Booth, 23 @ 20:27    I, Rita Hawthorne , personally performed the services described in this documentation. all medical record entries made by the scribe were at my direction and in my presence. I have reviewed the chart and agree that the record reflects my personal performance and is accurate and complete  Electronically signed: Rita Hawthorne NP

## 2023-12-18 NOTE — PROGRESS NOTE ADULT - ATTENDING COMMENTS
Additional Information: 57yo M with ischemic HFrEF, ADHF and cardiogenic shock awaiting heart transplant (status 2)  Neuro: no deficits, prn atarax for anxiety  Pulm: sating well on RA, prn albuterol and symbicort  CV: IABP 1:1 with optical sensor failure now RRA for pressure tracing, coreg, hydral, isordil  Renal: Cr under 1, no issues  GI: DASH diet  Heme: heparin gtt for LV thrombus and IABP.   ID: no active issues  Endo: BG controlled  Derm: Hepatitis immunization induced rash, improving on topical steroids  Lines: IABP (11/20), RRA (12/14)    lab vacation today

## 2023-12-18 NOTE — PROGRESS NOTE ADULT - ASSESSMENT
57 yo M with PMHx of HTN, CAD w/ 1 stent in 2009, ICH (2008) presented on 11/1 with abn ekg. Patient presented to [facility] where he was found to have STEMI, recommended to get cath however patient did not want to get it there so he left and came here.   EKG here with ST depression in lateral leads and elevation in anterior leads   Prior to C found to be tachycardic, dyspneic, intubated   C 11/1 with chronic total occlusion of LAD and RCA, with elevated RA and PA pressures  TTE 11/1 with severely decreased EF 32%, s/p IABP 11/1    RVP (11/1) Positive for COVID19  RVP (11/10) Negative  BCx (11/2, 11/4) NGTD  ETT Culture (11/2) NGTD  MRSA/MSSA PCR (11/2, 11/10) Negative  UA (11/10) 1 WBC    CXR (11/10) Clear Lungs  TTE (11/2) Left ventricular thrombus.    #Positive Blood Cultures (11/30 - Cutibacterium)  Growth this far from procurement raises question of contaminant  Given ID as Cutibacterium skin procurement contaminant is favored  Repeat blood cultures with NGTD  --No absolute ID contraindication to re-activate for transplant.   --Continue to follow CBC with diff  --Continue to follow temperature curve    #Rash  ?Secondary to Cefepime on 11/2?  ?Secondary to hepatitis vaccine?  ?Secondary to Vancomycin  --Appreciate Dermatology input  --Follow LFT's and CBC with Diff (For Eosinophil Counts)    #Enterococcus Bacteremia, Leukocytosis, Pre-Heart Transplant Evaluation Serologies  Concern for either central line or urinary source  CT A/P Noncontrast (11/18) No acute process  s/p ten-days of IV Vancomycin last dose received on 11/27    #Encounter to Vaccinate Patient  COVID19: COVID19 Infection This Admission. Would consider Vaccination in ~3 months  Influenza: declined  Pneumococcal: Recommend PCV20  HAV: received first dose 11/24  HBV: received first dose Heplisav 11/24  MMR: Not Mumps Immune, if >1 month until transplant would consider MMR dose  Varicella: Immune  Shingles: recommend Shingrix series  Tdap: received Tdap booster this admission    no absolute contra indications from an ID perspective to pursuing heart transplant    Chao Peters MD  Can be called via Teams  After 5pm/weekends [phone]     57 yo M with PMHx of HTN, CAD w/ 1 stent in 2009, ICH (2008) presented on 11/1 with abn ekg. Patient presented to [facility] where he was found to have STEMI, recommended to get cath however patient did not want to get it there so he left and came here.   EKG here with ST depression in lateral leads and elevation in anterior leads   Prior to C found to be tachycardic, dyspneic, intubated   C 11/1 with chronic total occlusion of LAD and RCA, with elevated RA and PA pressures  TTE 11/1 with severely decreased EF 32%, s/p IABP 11/1    RVP (11/1) Positive for COVID19  RVP (11/10) Negative  BCx (11/2, 11/4) NGTD  ETT Culture (11/2) NGTD  MRSA/MSSA PCR (11/2, 11/10) Negative  UA (11/10) 1 WBC    CXR (11/10) Clear Lungs  TTE (11/2) Left ventricular thrombus.    #Positive Blood Cultures (11/30 - Cutibacterium)  Growth this far from procurement raises question of contaminant  Given ID as Cutibacterium skin procurement contaminant is favored  Repeat blood cultures with NGTD  --No absolute ID contraindication to re-activate for transplant.   --Continue to follow CBC with diff  --Continue to follow temperature curve    #Rash  ?Secondary to Cefepime on 11/2?  ?Secondary to hepatitis vaccine?  ?Secondary to Vancomycin  --Appreciate Dermatology input  --Follow LFT's and CBC with Diff (For Eosinophil Counts)    #Enterococcus Bacteremia, Leukocytosis, Pre-Heart Transplant Evaluation Serologies  Concern for either central line or urinary source  CT A/P Noncontrast (11/18) No acute process  s/p ten-days of IV Vancomycin last dose received on 11/27    #Encounter to Vaccinate Patient  COVID19: COVID19 Infection This Admission. Would consider Vaccination in ~3 months  Influenza: declined  Pneumococcal: Recommend PCV20  HAV: received first dose 11/24  HBV: received first dose Heplisav 11/24  MMR: Not Mumps Immune, if >1 month until transplant would consider MMR dose  Varicella: Immune  Shingles: recommend Shingrix series  Tdap: received Tdap booster this admission    no absolute contra indications from an ID perspective to pursuing heart transplant    Chao Peters MD  Can be called via Teams  After 5pm/weekends [phone]

## 2023-12-19 LAB
ALBUMIN SERPL ELPH-MCNC: 3.8 G/DL — SIGNIFICANT CHANGE UP (ref 3.3–5)
ALBUMIN SERPL ELPH-MCNC: 3.8 G/DL — SIGNIFICANT CHANGE UP (ref 3.3–5)
ALP SERPL-CCNC: 60 U/L — SIGNIFICANT CHANGE UP (ref 40–120)
ALP SERPL-CCNC: 60 U/L — SIGNIFICANT CHANGE UP (ref 40–120)
ALT FLD-CCNC: 75 U/L — HIGH (ref 10–45)
ALT FLD-CCNC: 75 U/L — HIGH (ref 10–45)
ANION GAP SERPL CALC-SCNC: 11 MMOL/L — SIGNIFICANT CHANGE UP (ref 5–17)
ANION GAP SERPL CALC-SCNC: 11 MMOL/L — SIGNIFICANT CHANGE UP (ref 5–17)
APTT BLD: 78.9 SEC — HIGH (ref 24.5–35.6)
APTT BLD: 78.9 SEC — HIGH (ref 24.5–35.6)
AST SERPL-CCNC: 27 U/L — SIGNIFICANT CHANGE UP (ref 10–40)
AST SERPL-CCNC: 27 U/L — SIGNIFICANT CHANGE UP (ref 10–40)
BASOPHILS # BLD AUTO: 0.06 K/UL — SIGNIFICANT CHANGE UP (ref 0–0.2)
BASOPHILS # BLD AUTO: 0.06 K/UL — SIGNIFICANT CHANGE UP (ref 0–0.2)
BASOPHILS NFR BLD AUTO: 0.7 % — SIGNIFICANT CHANGE UP (ref 0–2)
BASOPHILS NFR BLD AUTO: 0.7 % — SIGNIFICANT CHANGE UP (ref 0–2)
BILIRUB SERPL-MCNC: 0.3 MG/DL — SIGNIFICANT CHANGE UP (ref 0.2–1.2)
BILIRUB SERPL-MCNC: 0.3 MG/DL — SIGNIFICANT CHANGE UP (ref 0.2–1.2)
BLD GP AB SCN SERPL QL: NEGATIVE — SIGNIFICANT CHANGE UP
BLD GP AB SCN SERPL QL: NEGATIVE — SIGNIFICANT CHANGE UP
BUN SERPL-MCNC: 30 MG/DL — HIGH (ref 7–23)
BUN SERPL-MCNC: 30 MG/DL — HIGH (ref 7–23)
CALCIUM SERPL-MCNC: 9.7 MG/DL — SIGNIFICANT CHANGE UP (ref 8.4–10.5)
CALCIUM SERPL-MCNC: 9.7 MG/DL — SIGNIFICANT CHANGE UP (ref 8.4–10.5)
CHLORIDE SERPL-SCNC: 104 MMOL/L — SIGNIFICANT CHANGE UP (ref 96–108)
CHLORIDE SERPL-SCNC: 104 MMOL/L — SIGNIFICANT CHANGE UP (ref 96–108)
CO2 SERPL-SCNC: 17 MMOL/L — LOW (ref 22–31)
CO2 SERPL-SCNC: 17 MMOL/L — LOW (ref 22–31)
CREAT SERPL-MCNC: 1.03 MG/DL — SIGNIFICANT CHANGE UP (ref 0.5–1.3)
CREAT SERPL-MCNC: 1.03 MG/DL — SIGNIFICANT CHANGE UP (ref 0.5–1.3)
EGFR: 84 ML/MIN/1.73M2 — SIGNIFICANT CHANGE UP
EGFR: 84 ML/MIN/1.73M2 — SIGNIFICANT CHANGE UP
EOSINOPHIL # BLD AUTO: 0.63 K/UL — HIGH (ref 0–0.5)
EOSINOPHIL # BLD AUTO: 0.63 K/UL — HIGH (ref 0–0.5)
EOSINOPHIL NFR BLD AUTO: 7.6 % — HIGH (ref 0–6)
EOSINOPHIL NFR BLD AUTO: 7.6 % — HIGH (ref 0–6)
GLUCOSE BLDC GLUCOMTR-MCNC: 118 MG/DL — HIGH (ref 70–99)
GLUCOSE BLDC GLUCOMTR-MCNC: 118 MG/DL — HIGH (ref 70–99)
GLUCOSE BLDC GLUCOMTR-MCNC: 122 MG/DL — HIGH (ref 70–99)
GLUCOSE BLDC GLUCOMTR-MCNC: 122 MG/DL — HIGH (ref 70–99)
GLUCOSE BLDC GLUCOMTR-MCNC: 125 MG/DL — HIGH (ref 70–99)
GLUCOSE BLDC GLUCOMTR-MCNC: 125 MG/DL — HIGH (ref 70–99)
GLUCOSE BLDC GLUCOMTR-MCNC: 98 MG/DL — SIGNIFICANT CHANGE UP (ref 70–99)
GLUCOSE BLDC GLUCOMTR-MCNC: 98 MG/DL — SIGNIFICANT CHANGE UP (ref 70–99)
GLUCOSE SERPL-MCNC: 141 MG/DL — HIGH (ref 70–99)
GLUCOSE SERPL-MCNC: 141 MG/DL — HIGH (ref 70–99)
HCT VFR BLD CALC: 35.7 % — LOW (ref 39–50)
HCT VFR BLD CALC: 35.7 % — LOW (ref 39–50)
HGB BLD-MCNC: 11.8 G/DL — LOW (ref 13–17)
HGB BLD-MCNC: 11.8 G/DL — LOW (ref 13–17)
IMM GRANULOCYTES NFR BLD AUTO: 0.6 % — SIGNIFICANT CHANGE UP (ref 0–0.9)
IMM GRANULOCYTES NFR BLD AUTO: 0.6 % — SIGNIFICANT CHANGE UP (ref 0–0.9)
INR BLD: 1.16 RATIO — SIGNIFICANT CHANGE UP (ref 0.85–1.18)
INR BLD: 1.16 RATIO — SIGNIFICANT CHANGE UP (ref 0.85–1.18)
LYMPHOCYTES # BLD AUTO: 1.65 K/UL — SIGNIFICANT CHANGE UP (ref 1–3.3)
LYMPHOCYTES # BLD AUTO: 1.65 K/UL — SIGNIFICANT CHANGE UP (ref 1–3.3)
LYMPHOCYTES # BLD AUTO: 20 % — SIGNIFICANT CHANGE UP (ref 13–44)
LYMPHOCYTES # BLD AUTO: 20 % — SIGNIFICANT CHANGE UP (ref 13–44)
MAGNESIUM SERPL-MCNC: 1.8 MG/DL — SIGNIFICANT CHANGE UP (ref 1.6–2.6)
MAGNESIUM SERPL-MCNC: 1.8 MG/DL — SIGNIFICANT CHANGE UP (ref 1.6–2.6)
MCHC RBC-ENTMCNC: 30.1 PG — SIGNIFICANT CHANGE UP (ref 27–34)
MCHC RBC-ENTMCNC: 30.1 PG — SIGNIFICANT CHANGE UP (ref 27–34)
MCHC RBC-ENTMCNC: 33.1 GM/DL — SIGNIFICANT CHANGE UP (ref 32–36)
MCHC RBC-ENTMCNC: 33.1 GM/DL — SIGNIFICANT CHANGE UP (ref 32–36)
MCV RBC AUTO: 91.1 FL — SIGNIFICANT CHANGE UP (ref 80–100)
MCV RBC AUTO: 91.1 FL — SIGNIFICANT CHANGE UP (ref 80–100)
MONOCYTES # BLD AUTO: 0.6 K/UL — SIGNIFICANT CHANGE UP (ref 0–0.9)
MONOCYTES # BLD AUTO: 0.6 K/UL — SIGNIFICANT CHANGE UP (ref 0–0.9)
MONOCYTES NFR BLD AUTO: 7.3 % — SIGNIFICANT CHANGE UP (ref 2–14)
MONOCYTES NFR BLD AUTO: 7.3 % — SIGNIFICANT CHANGE UP (ref 2–14)
NEUTROPHILS # BLD AUTO: 5.25 K/UL — SIGNIFICANT CHANGE UP (ref 1.8–7.4)
NEUTROPHILS # BLD AUTO: 5.25 K/UL — SIGNIFICANT CHANGE UP (ref 1.8–7.4)
NEUTROPHILS NFR BLD AUTO: 63.8 % — SIGNIFICANT CHANGE UP (ref 43–77)
NEUTROPHILS NFR BLD AUTO: 63.8 % — SIGNIFICANT CHANGE UP (ref 43–77)
NRBC # BLD: 0 /100 WBCS — SIGNIFICANT CHANGE UP (ref 0–0)
NRBC # BLD: 0 /100 WBCS — SIGNIFICANT CHANGE UP (ref 0–0)
PHOSPHATE SERPL-MCNC: 3.6 MG/DL — SIGNIFICANT CHANGE UP (ref 2.5–4.5)
PHOSPHATE SERPL-MCNC: 3.6 MG/DL — SIGNIFICANT CHANGE UP (ref 2.5–4.5)
PLATELET # BLD AUTO: 193 K/UL — SIGNIFICANT CHANGE UP (ref 150–400)
PLATELET # BLD AUTO: 193 K/UL — SIGNIFICANT CHANGE UP (ref 150–400)
POTASSIUM SERPL-MCNC: 4.1 MMOL/L — SIGNIFICANT CHANGE UP (ref 3.5–5.3)
POTASSIUM SERPL-MCNC: 4.1 MMOL/L — SIGNIFICANT CHANGE UP (ref 3.5–5.3)
POTASSIUM SERPL-SCNC: 4.1 MMOL/L — SIGNIFICANT CHANGE UP (ref 3.5–5.3)
POTASSIUM SERPL-SCNC: 4.1 MMOL/L — SIGNIFICANT CHANGE UP (ref 3.5–5.3)
PROT SERPL-MCNC: 6.7 G/DL — SIGNIFICANT CHANGE UP (ref 6–8.3)
PROT SERPL-MCNC: 6.7 G/DL — SIGNIFICANT CHANGE UP (ref 6–8.3)
PROTHROM AB SERPL-ACNC: 12.1 SEC — SIGNIFICANT CHANGE UP (ref 9.5–13)
PROTHROM AB SERPL-ACNC: 12.1 SEC — SIGNIFICANT CHANGE UP (ref 9.5–13)
RBC # BLD: 3.92 M/UL — LOW (ref 4.2–5.8)
RBC # BLD: 3.92 M/UL — LOW (ref 4.2–5.8)
RBC # FLD: 15.7 % — HIGH (ref 10.3–14.5)
RBC # FLD: 15.7 % — HIGH (ref 10.3–14.5)
RH IG SCN BLD-IMP: POSITIVE — SIGNIFICANT CHANGE UP
RH IG SCN BLD-IMP: POSITIVE — SIGNIFICANT CHANGE UP
SODIUM SERPL-SCNC: 132 MMOL/L — LOW (ref 135–145)
SODIUM SERPL-SCNC: 132 MMOL/L — LOW (ref 135–145)
UFH PPP CHRO-ACNC: 0.5 IU/ML — SIGNIFICANT CHANGE UP (ref 0.3–0.7)
UFH PPP CHRO-ACNC: 0.5 IU/ML — SIGNIFICANT CHANGE UP (ref 0.3–0.7)
WBC # BLD: 8.24 K/UL — SIGNIFICANT CHANGE UP (ref 3.8–10.5)
WBC # BLD: 8.24 K/UL — SIGNIFICANT CHANGE UP (ref 3.8–10.5)
WBC # FLD AUTO: 8.24 K/UL — SIGNIFICANT CHANGE UP (ref 3.8–10.5)
WBC # FLD AUTO: 8.24 K/UL — SIGNIFICANT CHANGE UP (ref 3.8–10.5)

## 2023-12-19 PROCEDURE — 99291 CRITICAL CARE FIRST HOUR: CPT

## 2023-12-19 PROCEDURE — 93010 ELECTROCARDIOGRAM REPORT: CPT

## 2023-12-19 PROCEDURE — 76536 US EXAM OF HEAD AND NECK: CPT | Mod: 26

## 2023-12-19 PROCEDURE — 71045 X-RAY EXAM CHEST 1 VIEW: CPT | Mod: 26

## 2023-12-19 PROCEDURE — 99292 CRITICAL CARE ADDL 30 MIN: CPT

## 2023-12-19 RX ORDER — MAGNESIUM SULFATE 500 MG/ML
2 VIAL (ML) INJECTION ONCE
Refills: 0 | Status: COMPLETED | OUTPATIENT
Start: 2023-12-19 | End: 2023-12-19

## 2023-12-19 RX ADMIN — Medication 9 UNIT(S): at 11:53

## 2023-12-19 RX ADMIN — Medication 50 MILLIGRAM(S): at 14:55

## 2023-12-19 RX ADMIN — ISOSORBIDE DINITRATE 30 MILLIGRAM(S): 5 TABLET ORAL at 17:08

## 2023-12-19 RX ADMIN — Medication 9 UNIT(S): at 17:13

## 2023-12-19 RX ADMIN — ISOSORBIDE DINITRATE 30 MILLIGRAM(S): 5 TABLET ORAL at 05:47

## 2023-12-19 RX ADMIN — ATORVASTATIN CALCIUM 80 MILLIGRAM(S): 80 TABLET, FILM COATED ORAL at 21:41

## 2023-12-19 RX ADMIN — INSULIN GLARGINE 12 UNIT(S): 100 INJECTION, SOLUTION SUBCUTANEOUS at 21:44

## 2023-12-19 RX ADMIN — Medication 50 MILLIGRAM(S): at 21:41

## 2023-12-19 RX ADMIN — CARVEDILOL PHOSPHATE 25 MILLIGRAM(S): 80 CAPSULE, EXTENDED RELEASE ORAL at 17:08

## 2023-12-19 RX ADMIN — ISOSORBIDE DINITRATE 30 MILLIGRAM(S): 5 TABLET ORAL at 11:49

## 2023-12-19 RX ADMIN — Medication 81 MILLIGRAM(S): at 11:49

## 2023-12-19 RX ADMIN — HEPARIN SODIUM 14 UNIT(S)/HR: 5000 INJECTION INTRAVENOUS; SUBCUTANEOUS at 19:32

## 2023-12-19 RX ADMIN — HEPARIN SODIUM 14 UNIT(S)/HR: 5000 INJECTION INTRAVENOUS; SUBCUTANEOUS at 11:50

## 2023-12-19 RX ADMIN — Medication 50 MILLIGRAM(S): at 05:48

## 2023-12-19 RX ADMIN — CARVEDILOL PHOSPHATE 25 MILLIGRAM(S): 80 CAPSULE, EXTENDED RELEASE ORAL at 05:47

## 2023-12-19 RX ADMIN — Medication 25 GRAM(S): at 05:51

## 2023-12-19 NOTE — PROGRESS NOTE ADULT - SUBJECTIVE AND OBJECTIVE BOX
LD VALENCIA  58y Male  MRN:[id number]    Patient is a 58y old  Male who presents with a chief complaint of NSTEMI (01 Nov 2023 20:29)    HPI:  59yo M w/ hx HTN, CAD w/ 1 stent in 2009, ICH (2008) presenting with abn ekg. Patient presented to [facility] where he was found to have STEMI, recommended to get cath however patient did not want to get it there so it left and came here.  Patient initially had cough, congestion, fever, was placed on antibiotics on Sunday.  Started feeling nauseous and had a presyncopal event after which he presented to ED last night.  Had chest pain as well.  Chest pain is midsternal.  Not currently having chest pain.  Received 4 aspirin 30 min pta. (01 Nov 2023 15:11)      Patient seen and evaluated at bedside in CCU. interval events noted    Interval HPI: remain in ccu. IABP in place        PAST MEDICAL & SURGICAL HISTORY:  HTN (hypertension)      CAD (coronary artery disease)  2009; stent      Intracranial hemorrhage  2008      Respiratory arrest  december 1st      Myocardial infarction, unspecified MI type, unspecified artery      History of coronary artery stent placement          REVIEW OF SYSTEMS:  as per hpi     VITALS:   Vital Signs Last 24 Hrs  T(C): 36.7 (19 Dec 2023 11:00), Max: 36.8 (18 Dec 2023 19:00)  T(F): 98.1 (19 Dec 2023 11:00), Max: 98.3 (18 Dec 2023 23:00)  HR: 76 (19 Dec 2023 13:00) (71 - 86)  BP: --  BP(mean): --  RR: 19 (19 Dec 2023 13:00) (12 - 24)  SpO2: 94% (19 Dec 2023 13:00) (94% - 99%)    Parameters below as of 19 Dec 2023 13:00  Patient On (Oxygen Delivery Method): room air          PHYSICAL EXAM:  GENERAL: NAD, comfortable   HEAD:  Atraumatic, Normocephalic  EYES: EOMI, PERRLA, conjunctiva and sclera clear  NECK: Supple, No JVD   CHEST/LUNG: Clear to auscultation bilaterally; No wheeze  HEART: Regular rate and rhythm; No murmurs, rubs, or gallops  ABDOMEN: Soft, Nontender, Nondistended; Bowel sounds present  EXTREMITIES:  2+ Peripheral Pulses, No clubbing, cyanosis, or edema  NEUROLOGY: nonfocal  SKIN: +rash  +iabp    Consultant(s) Notes Reviewed:  [x ] YES  [ ] NO  Care Discussed with Consultants/Other Providers [ x] YES  [ ] NO    MEDS:   MEDICATIONS  (STANDING):  aspirin  chewable 81 milliGRAM(s) Oral daily  atorvastatin 80 milliGRAM(s) Oral at bedtime  bisacodyl 5 milliGRAM(s) Oral every 12 hours  budesonide  80 MICROgram(s)/formoterol 4.5 MICROgram(s) Inhaler 2 Puff(s) Inhalation two times a day  carvedilol 25 milliGRAM(s) Oral every 12 hours  chlorhexidine 2% Cloths 1 Application(s) Topical daily  heparin  Infusion 1300 Unit(s)/Hr (14 mL/Hr) IV Continuous <Continuous>  hydrALAZINE 50 milliGRAM(s) Oral every 8 hours  insulin glargine Injectable (LANTUS) 12 Unit(s) SubCutaneous at bedtime  insulin lispro (ADMELOG) corrective regimen sliding scale   SubCutaneous three times a day before meals  insulin lispro (ADMELOG) corrective regimen sliding scale   SubCutaneous at bedtime  insulin lispro Injectable (ADMELOG) 9 Unit(s) SubCutaneous three times a day before meals  isosorbide   dinitrate Tablet (ISORDIL) 30 milliGRAM(s) Oral three times a day  polyethylene glycol 3350 17 Gram(s) Oral two times a day  senna 2 Tablet(s) Oral at bedtime    MEDICATIONS  (PRN):  hydrOXYzine hydrochloride 25 milliGRAM(s) Oral two times a day PRN Anxiety      ALLERGIES:  penicillins (Unknown)      LABS:                                                                     11.8   8.24  )-----------( 193      ( 19 Dec 2023 01:56 )             35.7   12-19    132<L>  |  104  |  30<H>  ----------------------------<  141<H>  4.1   |  17<L>  |  1.03    Ca    9.7      19 Dec 2023 01:56  Phos  3.6     12-19  Mg     1.8     12-19    TPro  6.7  /  Alb  3.8  /  TBili  0.3  /  DBili  x   /  AST  27  /  ALT  75<H>  /  AlkPhos  60  12-19      < from: CT Abdomen and Pelvis No Cont (11.28.23 @ 03:38) >  IMPRESSION:  Mildly dilated colon and prominent but not overly dilated small bowel   with air-fluid levels. No discrete transition point. Findings are   suggestive of ileus.    Intra-aortic balloon pump with the inferior marker at the level of the   inferior mesenteric artery and the balloon overlying the origins of the   renal arteries, celiac artery, and superior mesenteric artery. Consider   repositioning. Concern for IABP positioning was discussed with LANETTE Hawthorne   on 11/28/2023 at 3:42 AM by Dr. Shepard.    < end of copied text >          < from: Colonoscopy (11.17.23 @ 10:35) >                                                                                                        Impression:          - The entire examined colon is normal on direct and retroflexion views.                       - No specimens collected.  Recommendation:      - Resume previous diet today.                       - No large polyps or masses detected, No objection from GI standpoint to                 proceed with heart transplantation/advanced heart therapies.                       - Please call back should any further questions or concerns arise.    < end of copied text >     < from: Xray Chest 1 View- PORTABLE-Urgent (11.01.23 @ 07:42) >    IMPRESSION:  Clear lungs.    ---End of Report ---        < end of copied text >  < from: TTE W or WO Ultrasound Enhancing Agent (11.01.23 @ 10:23) >  _____________________________     CONCLUSIONS:      1. Left ventricular cavity is moderately dilated. Left ventricular wall thickness is normal. Left ventricular systolic function is severely decreased with an ejection fraction of 32 % by Chinchilla's method of disks. Regional wall motion abnormalities present.   2. Multiple segmental abnormalities exist. See findings.   3. There is moderate (grade 2) left ventricular diastolic dysfunction, with indeterminant filling pressure.   4. Normal right ventricular cavity size, wall thickness, and systolic function.   5. No significant valvular disease.   6. No pericardial effusion seen.   7. Compared to the transthoracic echocardiogram performed on 1/25/2017 the areas of akinesis are unchanged but there has been a decline in LV systolic function with new areas of hypokinesis.    __________________________________________________________________    < end of copied text >  < from: TTE Limited W or WO Ultrasound Enhancing Agent (11.02.23 @ 07:41) >  __________________________     CONCLUSIONS:      1. After obtaining consent, Definity ultrasound enhancing agent was given for enhanced left ventricular opacification and improved delineation of the left ventricular endocardial borders. Left ventricular systolic function is severely decreased with a calculated ejection fraction of 22 % by the Chinchilla's biplane method of disks. There is a left ventricular thrombus.   2. Findings were discussed with Litzy BOSS on 11/2/2023 at 8.49am.   3. There is a left ventricular thrombus.    _________________________________________________________________    < end of copied text >   LD VALENCIA  58y Male  MRN:[id number]    Patient is a 58y old  Male who presents with a chief complaint of NSTEMI (01 Nov 2023 20:29)    HPI:  57yo M w/ hx HTN, CAD w/ 1 stent in 2009, ICH (2008) presenting with abn ekg. Patient presented to [facility] where he was found to have STEMI, recommended to get cath however patient did not want to get it there so it left and came here.  Patient initially had cough, congestion, fever, was placed on antibiotics on Sunday.  Started feeling nauseous and had a presyncopal event after which he presented to ED last night.  Had chest pain as well.  Chest pain is midsternal.  Not currently having chest pain.  Received 4 aspirin 30 min pta. (01 Nov 2023 15:11)      Patient seen and evaluated at bedside in CCU. interval events noted    Interval HPI: remain in ccu. IABP in place        PAST MEDICAL & SURGICAL HISTORY:  HTN (hypertension)      CAD (coronary artery disease)  2009; stent      Intracranial hemorrhage  2008      Respiratory arrest  december 1st      Myocardial infarction, unspecified MI type, unspecified artery      History of coronary artery stent placement          REVIEW OF SYSTEMS:  as per hpi     VITALS:   Vital Signs Last 24 Hrs  T(C): 36.7 (19 Dec 2023 11:00), Max: 36.8 (18 Dec 2023 19:00)  T(F): 98.1 (19 Dec 2023 11:00), Max: 98.3 (18 Dec 2023 23:00)  HR: 76 (19 Dec 2023 13:00) (71 - 86)  BP: --  BP(mean): --  RR: 19 (19 Dec 2023 13:00) (12 - 24)  SpO2: 94% (19 Dec 2023 13:00) (94% - 99%)    Parameters below as of 19 Dec 2023 13:00  Patient On (Oxygen Delivery Method): room air          PHYSICAL EXAM:  GENERAL: NAD, comfortable   HEAD:  Atraumatic, Normocephalic  EYES: EOMI, PERRLA, conjunctiva and sclera clear  NECK: Supple, No JVD   CHEST/LUNG: Clear to auscultation bilaterally; No wheeze  HEART: Regular rate and rhythm; No murmurs, rubs, or gallops  ABDOMEN: Soft, Nontender, Nondistended; Bowel sounds present  EXTREMITIES:  2+ Peripheral Pulses, No clubbing, cyanosis, or edema  NEUROLOGY: nonfocal  SKIN: +rash  +iabp    Consultant(s) Notes Reviewed:  [x ] YES  [ ] NO  Care Discussed with Consultants/Other Providers [ x] YES  [ ] NO    MEDS:   MEDICATIONS  (STANDING):  aspirin  chewable 81 milliGRAM(s) Oral daily  atorvastatin 80 milliGRAM(s) Oral at bedtime  bisacodyl 5 milliGRAM(s) Oral every 12 hours  budesonide  80 MICROgram(s)/formoterol 4.5 MICROgram(s) Inhaler 2 Puff(s) Inhalation two times a day  carvedilol 25 milliGRAM(s) Oral every 12 hours  chlorhexidine 2% Cloths 1 Application(s) Topical daily  heparin  Infusion 1300 Unit(s)/Hr (14 mL/Hr) IV Continuous <Continuous>  hydrALAZINE 50 milliGRAM(s) Oral every 8 hours  insulin glargine Injectable (LANTUS) 12 Unit(s) SubCutaneous at bedtime  insulin lispro (ADMELOG) corrective regimen sliding scale   SubCutaneous three times a day before meals  insulin lispro (ADMELOG) corrective regimen sliding scale   SubCutaneous at bedtime  insulin lispro Injectable (ADMELOG) 9 Unit(s) SubCutaneous three times a day before meals  isosorbide   dinitrate Tablet (ISORDIL) 30 milliGRAM(s) Oral three times a day  polyethylene glycol 3350 17 Gram(s) Oral two times a day  senna 2 Tablet(s) Oral at bedtime    MEDICATIONS  (PRN):  hydrOXYzine hydrochloride 25 milliGRAM(s) Oral two times a day PRN Anxiety      ALLERGIES:  penicillins (Unknown)      LABS:                                                                     11.8   8.24  )-----------( 193      ( 19 Dec 2023 01:56 )             35.7   12-19    132<L>  |  104  |  30<H>  ----------------------------<  141<H>  4.1   |  17<L>  |  1.03    Ca    9.7      19 Dec 2023 01:56  Phos  3.6     12-19  Mg     1.8     12-19    TPro  6.7  /  Alb  3.8  /  TBili  0.3  /  DBili  x   /  AST  27  /  ALT  75<H>  /  AlkPhos  60  12-19      < from: CT Abdomen and Pelvis No Cont (11.28.23 @ 03:38) >  IMPRESSION:  Mildly dilated colon and prominent but not overly dilated small bowel   with air-fluid levels. No discrete transition point. Findings are   suggestive of ileus.    Intra-aortic balloon pump with the inferior marker at the level of the   inferior mesenteric artery and the balloon overlying the origins of the   renal arteries, celiac artery, and superior mesenteric artery. Consider   repositioning. Concern for IABP positioning was discussed with LANETTE Hawthorne   on 11/28/2023 at 3:42 AM by Dr. Shepard.    < end of copied text >          < from: Colonoscopy (11.17.23 @ 10:35) >                                                                                                        Impression:          - The entire examined colon is normal on direct and retroflexion views.                       - No specimens collected.  Recommendation:      - Resume previous diet today.                       - No large polyps or masses detected, No objection from GI standpoint to                 proceed with heart transplantation/advanced heart therapies.                       - Please call back should any further questions or concerns arise.    < end of copied text >     < from: Xray Chest 1 View- PORTABLE-Urgent (11.01.23 @ 07:42) >    IMPRESSION:  Clear lungs.    ---End of Report ---        < end of copied text >  < from: TTE W or WO Ultrasound Enhancing Agent (11.01.23 @ 10:23) >  _____________________________     CONCLUSIONS:      1. Left ventricular cavity is moderately dilated. Left ventricular wall thickness is normal. Left ventricular systolic function is severely decreased with an ejection fraction of 32 % by Chinchilla's method of disks. Regional wall motion abnormalities present.   2. Multiple segmental abnormalities exist. See findings.   3. There is moderate (grade 2) left ventricular diastolic dysfunction, with indeterminant filling pressure.   4. Normal right ventricular cavity size, wall thickness, and systolic function.   5. No significant valvular disease.   6. No pericardial effusion seen.   7. Compared to the transthoracic echocardiogram performed on 1/25/2017 the areas of akinesis are unchanged but there has been a decline in LV systolic function with new areas of hypokinesis.    __________________________________________________________________    < end of copied text >  < from: TTE Limited W or WO Ultrasound Enhancing Agent (11.02.23 @ 07:41) >  __________________________     CONCLUSIONS:      1. After obtaining consent, Definity ultrasound enhancing agent was given for enhanced left ventricular opacification and improved delineation of the left ventricular endocardial borders. Left ventricular systolic function is severely decreased with a calculated ejection fraction of 22 % by the Chinchilla's biplane method of disks. There is a left ventricular thrombus.   2. Findings were discussed with Litzy BOSS on 11/2/2023 at 8.49am.   3. There is a left ventricular thrombus.    _________________________________________________________________    < end of copied text >

## 2023-12-19 NOTE — PROGRESS NOTE ADULT - SUBJECTIVE AND OBJECTIVE BOX
PATIENT:  DEVORAH VALENCIA  [id number]    CHIEF COMPLAINT:  Patient is a 58y old  Male who presents with a chief complaint of Cardiogenic shock (18 Dec 2023 20:27)      INTERVAL HISTORY/OVERNIGHT EVENTS:  The patient was seen and examined at bedside.     REVIEW OF SYSTEMS:    Constitutional:     [ ] negative [ ] fevers [ ] chills [ ] weight loss [ ] weight gain  HEENT:                  [ ] negative [ ] dry eyes [ ] eye irritation [ ] postnasal drip [ ] nasal congestion  CV:                         [ ] negative  [ ] chest pain [ ] orthopnea [ ] palpitations [ ] murmur  Resp:                     [ ] negative [ ] cough [ ] shortness of breath [ ] dyspnea [ ] wheezing [ ] sputum [ ] hemoptysis  GI:                          [ ] negative [ ] nausea [ ] vomiting [ ] diarrhea [ ] constipation [ ] abd pain [ ] dysphagia   :                        [ ] negative [ ] dysuria [ ] nocturia [ ] hematuria [ ] increased urinary frequency  Musculoskeletal: [ ] negative [ ] back pain [ ] myalgias [ ] arthralgias [ ] fracture  Skin:                       [ ] negative [ ] rash [ ] itch  Neurological:        [ ] negative [ ] headache [ ] dizziness [ ] syncope [ ] weakness [ ] numbness  Psychiatric:           [ ] negative [ ] anxiety [ ] depression  Endocrine:            [ ] negative [ ] diabetes [ ] thyroid problem  Heme/Lymph:      [ ] negative [ ] anemia [ ] bleeding problem  Allergic/Immune: [ ] negative [ ] itchy eyes [ ] nasal discharge [ ] hives [ ] angioedema    [ ] All other systems negative  [ ] Unable to assess ROS because ________.    MEDICATIONS:  MEDICATIONS  (STANDING):  aspirin  chewable 81 milliGRAM(s) Oral daily  atorvastatin 80 milliGRAM(s) Oral at bedtime  bisacodyl 5 milliGRAM(s) Oral every 12 hours  budesonide  80 MICROgram(s)/formoterol 4.5 MICROgram(s) Inhaler 2 Puff(s) Inhalation two times a day  carvedilol 25 milliGRAM(s) Oral every 12 hours  chlorhexidine 2% Cloths 1 Application(s) Topical daily  heparin  Infusion 1300 Unit(s)/Hr (14 mL/Hr) IV Continuous <Continuous>  hydrALAZINE 50 milliGRAM(s) Oral every 8 hours  insulin glargine Injectable (LANTUS) 12 Unit(s) SubCutaneous at bedtime  insulin lispro (ADMELOG) corrective regimen sliding scale   SubCutaneous three times a day before meals  insulin lispro (ADMELOG) corrective regimen sliding scale   SubCutaneous at bedtime  insulin lispro Injectable (ADMELOG) 9 Unit(s) SubCutaneous three times a day before meals  isosorbide   dinitrate Tablet (ISORDIL) 30 milliGRAM(s) Oral three times a day  polyethylene glycol 3350 17 Gram(s) Oral two times a day  senna 2 Tablet(s) Oral at bedtime    MEDICATIONS  (PRN):  hydrOXYzine hydrochloride 25 milliGRAM(s) Oral two times a day PRN Anxiety      ALLERGIES:  Allergies    penicillins (Unknown)    Intolerances        OBJECTIVE:  ICU Vital Signs Last 24 Hrs  T(C): 36.8 (19 Dec 2023 03:00), Max: 36.8 (18 Dec 2023 19:00)  T(F): 98.2 (19 Dec 2023 03:00), Max: 98.3 (18 Dec 2023 23:00)  HR: 73 (19 Dec 2023 07:00) (72 - 86)  BP: --  BP(mean): --  ABP: --  ABP(mean): --  RR: 16 (19 Dec 2023 07:00) (12 - 24)  SpO2: 97% (19 Dec 2023 06:00) (97% - 99%)    O2 Parameters below as of 19 Dec 2023 06:00  Patient On (Oxygen Delivery Method): room air            Adult Advanced Hemodynamics Last 24 Hrs  CVP(mm Hg): --  CVP(cm H2O): --  CO: --  CI: --  PA: --  PA(mean): --  PCWP: --  SVR: --  SVRI: --  PVR: --  PVRI: --  CAPILLARY BLOOD GLUCOSE      POCT Blood Glucose.: 122 mg/dL (19 Dec 2023 07:32)  POCT Blood Glucose.: 127 mg/dL (18 Dec 2023 22:02)  POCT Blood Glucose.: 201 mg/dL (18 Dec 2023 18:13)  POCT Blood Glucose.: 142 mg/dL (18 Dec 2023 11:57)  POCT Blood Glucose.: 123 mg/dL (18 Dec 2023 08:29)    CAPILLARY BLOOD GLUCOSE      POCT Blood Glucose.: 122 mg/dL (19 Dec 2023 07:32)    I&O's Summary    18 Dec 2023 07:01  -  19 Dec 2023 07:00  --------------------------------------------------------  IN: 1076 mL / OUT: 1650 mL / NET: -574 mL      Daily     Daily     PHYSICAL EXAMINATION:  General: WN/WD NAD  HEENT: PERRLA, EOMI, moist mucous membranes  Neurology: A&Ox3, nonfocal, MOISE x 4  Respiratory: CTA B/L, normal respiratory effort, no wheezes, crackles, rales  CV: RRR, S1S2, no murmurs, rubs or gallops  Abdominal: Soft, NT, ND +BS, Last BM  Extremities: No edema, + peripheral pulses  Incisions:   Tubes:    LABS:                          11.8   8.24  )-----------( 193      ( 19 Dec 2023 01:56 )             35.7     12-19    132<L>  |  104  |  30<H>  ----------------------------<  141<H>  4.1   |  17<L>  |  1.03    Ca    9.7      19 Dec 2023 01:56  Phos  3.6     12-19  Mg     1.8     12-19    TPro  6.7  /  Alb  3.8  /  TBili  0.3  /  DBili  x   /  AST  27  /  ALT  75<H>  /  AlkPhos  60  12-19    LIVER FUNCTIONS - ( 19 Dec 2023 01:56 )  Alb: 3.8 g/dL / Pro: 6.7 g/dL / ALK PHOS: 60 U/L / ALT: 75 U/L / AST: 27 U/L / GGT: x           PT/INR - ( 19 Dec 2023 01:56 )   PT: 12.1 sec;   INR: 1.16 ratio         PTT - ( 19 Dec 2023 01:56 )  PTT:78.9 sec        Urinalysis Basic - ( 19 Dec 2023 01:56 )    Color: x / Appearance: x / SG: x / pH: x  Gluc: 141 mg/dL / Ketone: x  / Bili: x / Urobili: x   Blood: x / Protein: x / Nitrite: x   Leuk Esterase: x / RBC: x / WBC x   Sq Epi: x / Non Sq Epi: x / Bacteria: x        TELEMETRY:     EKG:     IMAGING:       PATIENT:  DEVORAH VALENCIA  [id number]    CHIEF COMPLAINT:  Patient is a 58y old  Male who presents with a chief complaint of Cardiogenic shock (18 Dec 2023 20:27)      INTERVAL HISTORY/OVERNIGHT EVENTS:  The patient was seen and examined at bedside.     REVIEW OF SYSTEMS:    Constitutional:     [ ] negative [ ] fevers [ ] chills [ ] weight loss [ ] weight gain  HEENT:                  [ ] negative [ ] dry eyes [ ] eye irritation [ ] postnasal drip [ ] nasal congestion  CV:                         [ ] negative  [ ] chest pain [ ] orthopnea [ ] palpitations [ ] murmur  Resp:                     [ ] negative [ ] cough [ ] shortness of breath [ ] dyspnea [ ] wheezing [ ] sputum [ ] hemoptysis  GI:                          [ ] negative [ ] nausea [ ] vomiting [ ] diarrhea [ ] constipation [ ] abd pain [ ] dysphagia   :                        [ ] negative [ ] dysuria [ ] nocturia [ ] hematuria [ ] increased urinary frequency  Musculoskeletal: [ ] negative [ ] back pain [ ] myalgias [ ] arthralgias [ ] fracture  Skin:                       [ ] negative [ ] rash [ ] itch  Neurological:        [ ] negative [ ] headache [ ] dizziness [ ] syncope [ ] weakness [ ] numbness  Psychiatric:           [ ] negative [ ] anxiety [ ] depression  Endocrine:            [ ] negative [ ] diabetes [ ] thyroid problem  Heme/Lymph:      [ ] negative [ ] anemia [ ] bleeding problem  Allergic/Immune: [ ] negative [ ] itchy eyes [ ] nasal discharge [ ] hives [ ] angioedema    [ ] All other systems negative  [ ] Unable to assess ROS because ________.    MEDICATIONS:  MEDICATIONS  (STANDING):  aspirin  chewable 81 milliGRAM(s) Oral daily  atorvastatin 80 milliGRAM(s) Oral at bedtime  bisacodyl 5 milliGRAM(s) Oral every 12 hours  budesonide  80 MICROgram(s)/formoterol 4.5 MICROgram(s) Inhaler 2 Puff(s) Inhalation two times a day  carvedilol 25 milliGRAM(s) Oral every 12 hours  chlorhexidine 2% Cloths 1 Application(s) Topical daily  heparin  Infusion 1300 Unit(s)/Hr (14 mL/Hr) IV Continuous <Continuous>  hydrALAZINE 50 milliGRAM(s) Oral every 8 hours  insulin glargine Injectable (LANTUS) 12 Unit(s) SubCutaneous at bedtime  insulin lispro (ADMELOG) corrective regimen sliding scale   SubCutaneous three times a day before meals  insulin lispro (ADMELOG) corrective regimen sliding scale   SubCutaneous at bedtime  insulin lispro Injectable (ADMELOG) 9 Unit(s) SubCutaneous three times a day before meals  isosorbide   dinitrate Tablet (ISORDIL) 30 milliGRAM(s) Oral three times a day  polyethylene glycol 3350 17 Gram(s) Oral two times a day  senna 2 Tablet(s) Oral at bedtime    MEDICATIONS  (PRN):  hydrOXYzine hydrochloride 25 milliGRAM(s) Oral two times a day PRN Anxiety      ALLERGIES:  Allergies    penicillins (Unknown)    Intolerances        OBJECTIVE:  ICU Vital Signs Last 24 Hrs  T(C): 36.8 (19 Dec 2023 03:00), Max: 36.8 (18 Dec 2023 19:00)  T(F): 98.2 (19 Dec 2023 03:00), Max: 98.3 (18 Dec 2023 23:00)  HR: 73 (19 Dec 2023 07:00) (72 - 86)  BP: --  BP(mean): --  ABP: --  ABP(mean): --  RR: 16 (19 Dec 2023 07:00) (12 - 24)  SpO2: 97% (19 Dec 2023 06:00) (97% - 99%)    O2 Parameters below as of 19 Dec 2023 06:00  Patient On (Oxygen Delivery Method): room air            Adult Advanced Hemodynamics Last 24 Hrs  CVP(mm Hg): --  CVP(cm H2O): --  CO: --  CI: --  PA: --  PA(mean): --  PCWP: --  SVR: --  SVRI: --  PVR: --  PVRI: --  CAPILLARY BLOOD GLUCOSE      POCT Blood Glucose.: 122 mg/dL (19 Dec 2023 07:32)  POCT Blood Glucose.: 127 mg/dL (18 Dec 2023 22:02)  POCT Blood Glucose.: 201 mg/dL (18 Dec 2023 18:13)  POCT Blood Glucose.: 142 mg/dL (18 Dec 2023 11:57)  POCT Blood Glucose.: 123 mg/dL (18 Dec 2023 08:29)    CAPILLARY BLOOD GLUCOSE      POCT Blood Glucose.: 122 mg/dL (19 Dec 2023 07:32)    I&O's Summary    18 Dec 2023 07:01  -  19 Dec 2023 07:00  --------------------------------------------------------  IN: 1076 mL / OUT: 1650 mL / NET: -574 mL      Daily     Daily     PHYSICAL EXAMINATION:  General: WN/WD NAD  HEENT: PERRLA, EOMI, moist mucous membranes  Neurology: A&Ox3, nonfocal, MOISE x 4  Respiratory: CTA B/L, normal respiratory effort, no wheezes, crackles, rales  CV: RRR, S1S2, no murmurs, rubs or gallops  Abdominal: Soft, NT, ND +BS, Last BM  Extremities: No edema, + peripheral pulses  Incisions:   Tubes:    LABS:                          11.8   8.24  )-----------( 193      ( 19 Dec 2023 01:56 )             35.7     12-19    132<L>  |  104  |  30<H>  ----------------------------<  141<H>  4.1   |  17<L>  |  1.03    Ca    9.7      19 Dec 2023 01:56  Phos  3.6     12-19  Mg     1.8     12-19    TPro  6.7  /  Alb  3.8  /  TBili  0.3  /  DBili  x   /  AST  27  /  ALT  75<H>  /  AlkPhos  60  12-19    LIVER FUNCTIONS - ( 19 Dec 2023 01:56 )  Alb: 3.8 g/dL / Pro: 6.7 g/dL / ALK PHOS: 60 U/L / ALT: 75 U/L / AST: 27 U/L / GGT: x           PT/INR - ( 19 Dec 2023 01:56 )   PT: 12.1 sec;   INR: 1.16 ratio         PTT - ( 19 Dec 2023 01:56 )  PTT:78.9 sec        Urinalysis Basic - ( 19 Dec 2023 01:56 )    Color: x / Appearance: x / SG: x / pH: x  Gluc: 141 mg/dL / Ketone: x  / Bili: x / Urobili: x   Blood: x / Protein: x / Nitrite: x   Leuk Esterase: x / RBC: x / WBC x   Sq Epi: x / Non Sq Epi: x / Bacteria: x        TELEMETRY:     EKG:     IMAGING:       PATIENT:  DEVORAH VALENCIA  [id number]    CHIEF COMPLAINT:  Patient is a 58y old  Male who presents with a chief complaint of Cardiogenic shock (18 Dec 2023 20:27)      INTERVAL HISTORY/OVERNIGHT EVENTS:  The patient was seen and examined at bedside.     REVIEW OF SYSTEMS:    Constitutional:     [ ] negative [ ] fevers [ ] chills [ ] weight loss [ ] weight gain  HEENT:                  [ ] negative [ ] dry eyes [ ] eye irritation [ ] postnasal drip [ ] nasal congestion  CV:                         [ ] negative  [ ] chest pain [ ] orthopnea [ ] palpitations [ ] murmur  Resp:                     [ ] negative [ ] cough [ ] shortness of breath [ ] dyspnea [ ] wheezing [ ] sputum [ ] hemoptysis  GI:                          [ ] negative [ ] nausea [ ] vomiting [ ] diarrhea [ ] constipation [ ] abd pain [ ] dysphagia   :                        [ ] negative [ ] dysuria [ ] nocturia [ ] hematuria [ ] increased urinary frequency  Musculoskeletal: [ ] negative [ ] back pain [ ] myalgias [ ] arthralgias [ ] fracture  Skin:                       [ ] negative [ ] rash [ ] itch  Neurological:        [ ] negative [ ] headache [ ] dizziness [ ] syncope [ ] weakness [ ] numbness  Psychiatric:           [ ] negative [ ] anxiety [ ] depression  Endocrine:            [ ] negative [ ] diabetes [ ] thyroid problem  Heme/Lymph:      [ ] negative [ ] anemia [ ] bleeding problem  Allergic/Immune: [ ] negative [ ] itchy eyes [ ] nasal discharge [ ] hives [ ] angioedema    [ ] All other systems negative  [ ] Unable to assess ROS because ________.    MEDICATIONS:  MEDICATIONS  (STANDING):  aspirin  chewable 81 milliGRAM(s) Oral daily  atorvastatin 80 milliGRAM(s) Oral at bedtime  bisacodyl 5 milliGRAM(s) Oral every 12 hours  budesonide  80 MICROgram(s)/formoterol 4.5 MICROgram(s) Inhaler 2 Puff(s) Inhalation two times a day  carvedilol 25 milliGRAM(s) Oral every 12 hours  chlorhexidine 2% Cloths 1 Application(s) Topical daily  heparin  Infusion 1300 Unit(s)/Hr (14 mL/Hr) IV Continuous <Continuous>  hydrALAZINE 50 milliGRAM(s) Oral every 8 hours  insulin glargine Injectable (LANTUS) 12 Unit(s) SubCutaneous at bedtime  insulin lispro (ADMELOG) corrective regimen sliding scale   SubCutaneous three times a day before meals  insulin lispro (ADMELOG) corrective regimen sliding scale   SubCutaneous at bedtime  insulin lispro Injectable (ADMELOG) 9 Unit(s) SubCutaneous three times a day before meals  isosorbide   dinitrate Tablet (ISORDIL) 30 milliGRAM(s) Oral three times a day  polyethylene glycol 3350 17 Gram(s) Oral two times a day  senna 2 Tablet(s) Oral at bedtime    MEDICATIONS  (PRN):  hydrOXYzine hydrochloride 25 milliGRAM(s) Oral two times a day PRN Anxiety      ALLERGIES:  Allergies    penicillins (Unknown)    Intolerances        OBJECTIVE:  ICU Vital Signs Last 24 Hrs  T(C): 36.8 (19 Dec 2023 03:00), Max: 36.8 (18 Dec 2023 19:00)  T(F): 98.2 (19 Dec 2023 03:00), Max: 98.3 (18 Dec 2023 23:00)  HR: 73 (19 Dec 2023 07:00) (72 - 86)  BP: --  BP(mean): --  ABP: --  ABP(mean): --  RR: 16 (19 Dec 2023 07:00) (12 - 24)  SpO2: 97% (19 Dec 2023 06:00) (97% - 99%)    O2 Parameters below as of 19 Dec 2023 06:00  Patient On (Oxygen Delivery Method): room air      POCT Blood Glucose.: 122 mg/dL (19 Dec 2023 07:32)  POCT Blood Glucose.: 127 mg/dL (18 Dec 2023 22:02)  POCT Blood Glucose.: 201 mg/dL (18 Dec 2023 18:13)  POCT Blood Glucose.: 142 mg/dL (18 Dec 2023 11:57)  POCT Blood Glucose.: 123 mg/dL (18 Dec 2023 08:29)    CAPILLARY BLOOD GLUCOSE      POCT Blood Glucose.: 122 mg/dL (19 Dec 2023 07:32)    I&O's Summary    18 Dec 2023 07:01  -  19 Dec 2023 07:00  --------------------------------------------------------  IN: 1076 mL / OUT: 1650 mL / NET: -574 mL      Daily     Daily     PHYSICAL EXAMINATION:  General: WN/WD NAD  HEENT: PERRLA, EOMI, moist mucous membranes  Neurology: A&Ox3, nonfocal, MOISE x 4  Respiratory: CTA B/L, normal respiratory effort, no wheezes, crackles, rales  CV: RRR, S1S2, no murmurs, rubs or gallops  Abdominal: Soft, NT, ND +BS, Last BM  Extremities: No edema, + peripheral pulses  Incisions:   Tubes:    LABS:                          11.8   8.24  )-----------( 193      ( 19 Dec 2023 01:56 )             35.7     12-19    132<L>  |  104  |  30<H>  ----------------------------<  141<H>  4.1   |  17<L>  |  1.03    Ca    9.7      19 Dec 2023 01:56  Phos  3.6     12-19  Mg     1.8     12-19    TPro  6.7  /  Alb  3.8  /  TBili  0.3  /  DBili  x   /  AST  27  /  ALT  75<H>  /  AlkPhos  60  12-19    LIVER FUNCTIONS - ( 19 Dec 2023 01:56 )  Alb: 3.8 g/dL / Pro: 6.7 g/dL / ALK PHOS: 60 U/L / ALT: 75 U/L / AST: 27 U/L / GGT: x           PT/INR - ( 19 Dec 2023 01:56 )   PT: 12.1 sec;   INR: 1.16 ratio         PTT - ( 19 Dec 2023 01:56 )  PTT:78.9 sec    Urinalysis Basic - ( 19 Dec 2023 01:56 )    TELEMETRY:     EKG:     IMAGING:       PATIENT:  DEVORAH VALENCIA  [id number]    CHIEF COMPLAINT:  Patient is a 58y old  Male who presents with a chief complaint of Cardiogenic shock (18 Dec 2023 20:27)      INTERVAL HISTORY/OVERNIGHT EVENTS:  The patient was seen and examined at bedside.     REVIEW OF SYSTEMS:    Constitutional:     [ ] negative [ ] fevers [ ] chills [ ] weight loss [ ] weight gain  HEENT:                  [ ] negative [ ] dry eyes [ ] eye irritation [ ] postnasal drip [ ] nasal congestion  CV:                         [ ] negative  [ ] chest pain [ ] orthopnea [ ] palpitations [ ] murmur  Resp:                     [ ] negative [ ] cough [ ] shortness of breath [ ] dyspnea [ ] wheezing [ ] sputum [ ] hemoptysis  GI:                          [ ] negative [ ] nausea [ ] vomiting [ ] diarrhea [ ] constipation [ ] abd pain [ ] dysphagia   :                        [ ] negative [ ] dysuria [ ] nocturia [ ] hematuria [ ] increased urinary frequency  Musculoskeletal: [ ] negative [ ] back pain [ ] myalgias [ ] arthralgias [ ] fracture  Skin:                       [ ] negative [ ] rash [ ] itch  Neurological:        [ ] negative [ ] headache [ ] dizziness [ ] syncope [ ] weakness [ ] numbness  Psychiatric:           [ ] negative [ ] anxiety [ ] depression  Endocrine:            [ ] negative [ ] diabetes [ ] thyroid problem  Heme/Lymph:      [ ] negative [ ] anemia [ ] bleeding problem  Allergic/Immune: [ ] negative [ ] itchy eyes [ ] nasal discharge [ ] hives [ ] angioedema    [ ] All other systems negative  [ ] Unable to assess ROS because ________.    MEDICATIONS:  MEDICATIONS  (STANDING):  aspirin  chewable 81 milliGRAM(s) Oral daily  atorvastatin 80 milliGRAM(s) Oral at bedtime  bisacodyl 5 milliGRAM(s) Oral every 12 hours  budesonide  80 MICROgram(s)/formoterol 4.5 MICROgram(s) Inhaler 2 Puff(s) Inhalation two times a day  carvedilol 25 milliGRAM(s) Oral every 12 hours  chlorhexidine 2% Cloths 1 Application(s) Topical daily  heparin  Infusion 1300 Unit(s)/Hr (14 mL/Hr) IV Continuous <Continuous>  hydrALAZINE 50 milliGRAM(s) Oral every 8 hours  insulin glargine Injectable (LANTUS) 12 Unit(s) SubCutaneous at bedtime  insulin lispro (ADMELOG) corrective regimen sliding scale   SubCutaneous three times a day before meals  insulin lispro (ADMELOG) corrective regimen sliding scale   SubCutaneous at bedtime  insulin lispro Injectable (ADMELOG) 9 Unit(s) SubCutaneous three times a day before meals  isosorbide   dinitrate Tablet (ISORDIL) 30 milliGRAM(s) Oral three times a day  polyethylene glycol 3350 17 Gram(s) Oral two times a day  senna 2 Tablet(s) Oral at bedtime    MEDICATIONS  (PRN):  hydrOXYzine hydrochloride 25 milliGRAM(s) Oral two times a day PRN Anxiety      ALLERGIES:  Allergies    penicillins (Unknown)    Intolerances        OBJECTIVE:  ICU Vital Signs Last 24 Hrs  T(C): 36.8 (19 Dec 2023 03:00), Max: 36.8 (18 Dec 2023 19:00)  T(F): 98.2 (19 Dec 2023 03:00), Max: 98.3 (18 Dec 2023 23:00)  HR: 73 (19 Dec 2023 07:00) (72 - 86)  BP: --  BP(mean): --  ABP: --  ABP(mean): --  RR: 16 (19 Dec 2023 07:00) (12 - 24)  SpO2: 97% (19 Dec 2023 06:00) (97% - 99%)    O2 Parameters below as of 19 Dec 2023 06:00  Patient On (Oxygen Delivery Method): room air      POCT Blood Glucose.: 122 mg/dL (19 Dec 2023 07:32)  POCT Blood Glucose.: 127 mg/dL (18 Dec 2023 22:02)  POCT Blood Glucose.: 201 mg/dL (18 Dec 2023 18:13)  POCT Blood Glucose.: 142 mg/dL (18 Dec 2023 11:57)  POCT Blood Glucose.: 123 mg/dL (18 Dec 2023 08:29)    CAPILLARY BLOOD GLUCOSE      POCT Blood Glucose.: 122 mg/dL (19 Dec 2023 07:32)    I&O's Summary    18 Dec 2023 07:01  -  19 Dec 2023 07:00  --------------------------------------------------------  IN: 1076 mL / OUT: 1650 mL / NET: -574 mL      Daily     Daily     PHYSICAL EXAMINATION:  General: WN/WD NAD  HEENT: PERRLA, EOMI, moist mucous membranes  Neurology: A&Ox3, nonfocal, MOISE x 4  Respiratory: CTA B/L, normal respiratory effort, no wheezes, crackles, rales  CV: RRR, S1S2, no murmurs, rubs or gallops  Abdominal: Soft, NT, ND +BS, Last BM  Extremities: No edema, + peripheral pulses  Incisions:   Tubes:    LABS:                          11.8   8.24  )-----------( 193      ( 19 Dec 2023 01:56 )             35.7     12-19    132<L>  |  104  |  30<H>  ----------------------------<  141<H>  4.1   |  17<L>  |  1.03    Ca    9.7      19 Dec 2023 01:56  Phos  3.6     12-19  Mg     1.8     12-19    TPro  6.7  /  Alb  3.8  /  TBili  0.3  /  DBili  x   /  AST  27  /  ALT  75<H>  /  AlkPhos  60  12-19    LIVER FUNCTIONS - ( 19 Dec 2023 01:56 )  Alb: 3.8 g/dL / Pro: 6.7 g/dL / ALK PHOS: 60 U/L / ALT: 75 U/L / AST: 27 U/L / GGT: x           PT/INR - ( 19 Dec 2023 01:56 )   PT: 12.1 sec;   INR: 1.16 ratio         PTT - ( 19 Dec 2023 01:56 )  PTT:78.9 sec    Urinalysis Basic - ( 19 Dec 2023 01:56 )    TELEMETRY:     EKG:     IMAGING:       PATIENT:  DEVORAH VALENCIA  [id number]    CHIEF COMPLAINT:  Patient is a 58y old  Male who presents with a chief complaint of Cardiogenic shock (18 Dec 2023 20:27)      INTERVAL HISTORY/OVERNIGHT EVENTS:  The patient was seen and examined at bedside.     REVIEW OF SYSTEMS:  GENERAL: No fever or chills  EYES: No change in vision  HEENT: No trouble swallowing or speaking  CARDIAC: No chest pain  PULMONARY: No cough or SOB  GI: No abdominal pain, no nausea or no vomiting  : No changes in urination  NEURO: No headache, no numbness  MSK: No joint pain    MEDICATIONS:  MEDICATIONS  (STANDING):  aspirin  chewable 81 milliGRAM(s) Oral daily  atorvastatin 80 milliGRAM(s) Oral at bedtime  bisacodyl 5 milliGRAM(s) Oral every 12 hours  budesonide  80 MICROgram(s)/formoterol 4.5 MICROgram(s) Inhaler 2 Puff(s) Inhalation two times a day  carvedilol 25 milliGRAM(s) Oral every 12 hours  chlorhexidine 2% Cloths 1 Application(s) Topical daily  heparin  Infusion 1300 Unit(s)/Hr (14 mL/Hr) IV Continuous <Continuous>  hydrALAZINE 50 milliGRAM(s) Oral every 8 hours  insulin glargine Injectable (LANTUS) 12 Unit(s) SubCutaneous at bedtime  insulin lispro (ADMELOG) corrective regimen sliding scale   SubCutaneous three times a day before meals  insulin lispro (ADMELOG) corrective regimen sliding scale   SubCutaneous at bedtime  insulin lispro Injectable (ADMELOG) 9 Unit(s) SubCutaneous three times a day before meals  isosorbide   dinitrate Tablet (ISORDIL) 30 milliGRAM(s) Oral three times a day  polyethylene glycol 3350 17 Gram(s) Oral two times a day  senna 2 Tablet(s) Oral at bedtime    MEDICATIONS  (PRN):  hydrOXYzine hydrochloride 25 milliGRAM(s) Oral two times a day PRN Anxiety      ALLERGIES:  Allergies    penicillins (Unknown)    Intolerances    OBJECTIVE:  ICU Vital Signs Last 24 Hrs  T(C): 36.8 (19 Dec 2023 03:00), Max: 36.8 (18 Dec 2023 19:00)  T(F): 98.2 (19 Dec 2023 03:00), Max: 98.3 (18 Dec 2023 23:00)  HR: 73 (19 Dec 2023 07:00) (72 - 86)  RR: 16 (19 Dec 2023 07:00) (12 - 24)  SpO2: 97% (19 Dec 2023 06:00) (97% - 99%)    O2 Parameters below as of 19 Dec 2023 06:00  Patient On (Oxygen Delivery Method): room air      POCT Blood Glucose.: 122 mg/dL (19 Dec 2023 07:32)  POCT Blood Glucose.: 127 mg/dL (18 Dec 2023 22:02)  POCT Blood Glucose.: 201 mg/dL (18 Dec 2023 18:13)  POCT Blood Glucose.: 142 mg/dL (18 Dec 2023 11:57)  POCT Blood Glucose.: 123 mg/dL (18 Dec 2023 08:29)    CAPILLARY BLOOD GLUCOSE      POCT Blood Glucose.: 122 mg/dL (19 Dec 2023 07:32)    I&O's Summary    18 Dec 2023 07:01  -  19 Dec 2023 07:00  --------------------------------------------------------  IN: 1076 mL / OUT: 1650 mL / NET: -574 mL      Daily     Daily     PHYSICAL EXAMINATION:  General: WN/WD NAD  HEENT: PERRLA, EOMI, moist mucous membranes  Neurology: A&Ox3, nonfocal, MOISE x 4  Respiratory: CTA B/L, normal respiratory effort, no wheezes, crackles, rales  CV: RRR, S1S2, no murmurs, rubs or gallops  Abdominal: Soft, NT, ND +BS, Last BM  Extremities: No edema, + peripheral pulses  Incisions:   Tubes:    LABS:                          11.8   8.24  )-----------( 193      ( 19 Dec 2023 01:56 )             35.7     12-19    132<L>  |  104  |  30<H>  ----------------------------<  141<H>  4.1   |  17<L>  |  1.03    Ca    9.7      19 Dec 2023 01:56  Phos  3.6     12-19  Mg     1.8     12-19    TPro  6.7  /  Alb  3.8  /  TBili  0.3  /  DBili  x   /  AST  27  /  ALT  75<H>  /  AlkPhos  60  12-19    LIVER FUNCTIONS - ( 19 Dec 2023 01:56 )  Alb: 3.8 g/dL / Pro: 6.7 g/dL / ALK PHOS: 60 U/L / ALT: 75 U/L / AST: 27 U/L / GGT: x           PT/INR - ( 19 Dec 2023 01:56 )   PT: 12.1 sec;   INR: 1.16 ratio         PTT - ( 19 Dec 2023 01:56 )  PTT:78.9 sec    Urinalysis Basic - ( 19 Dec 2023 01:56 )    TELEMETRY:     EKG:     IMAGING:       PATIENT:  DEVORAH VALENCIA  [id number]    CHIEF COMPLAINT:  Patient is a 58y old  Male who presents with a chief complaint of Cardiogenic shock (18 Dec 2023 20:27)      INTERVAL HISTORY/OVERNIGHT EVENTS:  The patient was seen and examined at bedside.     REVIEW OF SYSTEMS:  GENERAL: No fever or chills  EYES: No change in vision  HEENT: No trouble swallowing or speaking  CARDIAC: No chest pain  PULMONARY: No cough or SOB  GI: No abdominal pain, no nausea or no vomiting  : No changes in urination  NEURO: No headache, no numbness  MSK: No joint pain    MEDICATIONS:  MEDICATIONS  (STANDING):  aspirin  chewable 81 milliGRAM(s) Oral daily  atorvastatin 80 milliGRAM(s) Oral at bedtime  bisacodyl 5 milliGRAM(s) Oral every 12 hours  budesonide  80 MICROgram(s)/formoterol 4.5 MICROgram(s) Inhaler 2 Puff(s) Inhalation two times a day  carvedilol 25 milliGRAM(s) Oral every 12 hours  chlorhexidine 2% Cloths 1 Application(s) Topical daily  heparin  Infusion 1300 Unit(s)/Hr (14 mL/Hr) IV Continuous <Continuous>  hydrALAZINE 50 milliGRAM(s) Oral every 8 hours  insulin glargine Injectable (LANTUS) 12 Unit(s) SubCutaneous at bedtime  insulin lispro (ADMELOG) corrective regimen sliding scale   SubCutaneous three times a day before meals  insulin lispro (ADMELOG) corrective regimen sliding scale   SubCutaneous at bedtime  insulin lispro Injectable (ADMELOG) 9 Unit(s) SubCutaneous three times a day before meals  isosorbide   dinitrate Tablet (ISORDIL) 30 milliGRAM(s) Oral three times a day  polyethylene glycol 3350 17 Gram(s) Oral two times a day  senna 2 Tablet(s) Oral at bedtime    MEDICATIONS  (PRN):  hydrOXYzine hydrochloride 25 milliGRAM(s) Oral two times a day PRN Anxiety      ALLERGIES:  Allergies    penicillins (Unknown)    Intolerances    OBJECTIVE:  ICU Vital Signs Last 24 Hrs  T(C): 36.8 (19 Dec 2023 03:00), Max: 36.8 (18 Dec 2023 19:00)  T(F): 98.2 (19 Dec 2023 03:00), Max: 98.3 (18 Dec 2023 23:00)  HR: 73 (19 Dec 2023 07:00) (72 - 86)  RR: 16 (19 Dec 2023 07:00) (12 - 24)  SpO2: 97% (19 Dec 2023 06:00) (97% - 99%)    O2 Parameters below as of 19 Dec 2023 06:00  Patient On (Oxygen Delivery Method): room air      POCT Blood Glucose.: 122 mg/dL (19 Dec 2023 07:32)  POCT Blood Glucose.: 127 mg/dL (18 Dec 2023 22:02)  POCT Blood Glucose.: 201 mg/dL (18 Dec 2023 18:13)  POCT Blood Glucose.: 142 mg/dL (18 Dec 2023 11:57)  POCT Blood Glucose.: 123 mg/dL (18 Dec 2023 08:29)    CAPILLARY BLOOD GLUCOSE      POCT Blood Glucose.: 122 mg/dL (19 Dec 2023 07:32)    I&O's Summary    18 Dec 2023 07:01  -  19 Dec 2023 07:00  --------------------------------------------------------  IN: 1076 mL / OUT: 1650 mL / NET: -574 mL      Daily     Daily     PHYSICAL EXAMINATION:  General: WN/WD NAD  HEENT: PERRLA, EOMI, moist mucous membranes  Neurology: A&Ox3, nonfocal, MOISE x 4  Respiratory: CTA B/L, normal respiratory effort, no wheezes, crackles, rales  CV: RRR, S1S2, no murmurs, rubs or gallops  Abdominal: Soft, NT, ND +BS, Last BM  Extremities: No edema, + peripheral pulses  Incisions:   Tubes:    LABS:                          11.8   8.24  )-----------( 193      ( 19 Dec 2023 01:56 )             35.7     12-19    132<L>  |  104  |  30<H>  ----------------------------<  141<H>  4.1   |  17<L>  |  1.03    Ca    9.7      19 Dec 2023 01:56  Phos  3.6     12-19  Mg     1.8     12-19    TPro  6.7  /  Alb  3.8  /  TBili  0.3  /  DBili  x   /  AST  27  /  ALT  75<H>  /  AlkPhos  60  12-19    LIVER FUNCTIONS - ( 19 Dec 2023 01:56 )  Alb: 3.8 g/dL / Pro: 6.7 g/dL / ALK PHOS: 60 U/L / ALT: 75 U/L / AST: 27 U/L / GGT: x           PT/INR - ( 19 Dec 2023 01:56 )   PT: 12.1 sec;   INR: 1.16 ratio         PTT - ( 19 Dec 2023 01:56 )  PTT:78.9 sec    Urinalysis Basic - ( 19 Dec 2023 01:56 )    TELEMETRY:     EKG:     IMAGING:       PATIENT:  DEVORAH VALENCIA  [id number]    CHIEF COMPLAINT:  Patient is a 58y old  Male who presents with a chief complaint of Cardiogenic shock (18 Dec 2023 20:27)      INTERVAL HISTORY/OVERNIGHT EVENTS:  The patient was seen and examined at bedside.     REVIEW OF SYSTEMS:  GENERAL: No fever or chills  EYES: No change in vision  HEENT: No trouble swallowing or speaking  CARDIAC: No chest pain  PULMONARY: No cough or SOB  GI: No abdominal pain, no nausea or no vomiting  : No changes in urination  NEURO: No headache, no numbness  MSK: No joint pain    MEDICATIONS:  MEDICATIONS  (STANDING):  aspirin  chewable 81 milliGRAM(s) Oral daily  atorvastatin 80 milliGRAM(s) Oral at bedtime  bisacodyl 5 milliGRAM(s) Oral every 12 hours  budesonide  80 MICROgram(s)/formoterol 4.5 MICROgram(s) Inhaler 2 Puff(s) Inhalation two times a day  carvedilol 25 milliGRAM(s) Oral every 12 hours  chlorhexidine 2% Cloths 1 Application(s) Topical daily  heparin  Infusion 1300 Unit(s)/Hr (14 mL/Hr) IV Continuous <Continuous>  hydrALAZINE 50 milliGRAM(s) Oral every 8 hours  insulin glargine Injectable (LANTUS) 12 Unit(s) SubCutaneous at bedtime  insulin lispro (ADMELOG) corrective regimen sliding scale   SubCutaneous three times a day before meals  insulin lispro (ADMELOG) corrective regimen sliding scale   SubCutaneous at bedtime  insulin lispro Injectable (ADMELOG) 9 Unit(s) SubCutaneous three times a day before meals  isosorbide   dinitrate Tablet (ISORDIL) 30 milliGRAM(s) Oral three times a day  polyethylene glycol 3350 17 Gram(s) Oral two times a day  senna 2 Tablet(s) Oral at bedtime    MEDICATIONS  (PRN):  hydrOXYzine hydrochloride 25 milliGRAM(s) Oral two times a day PRN Anxiety      ALLERGIES:  Allergies    penicillins (Unknown)    Intolerances    OBJECTIVE:  ICU Vital Signs Last 24 Hrs  T(C): 36.8 (19 Dec 2023 03:00), Max: 36.8 (18 Dec 2023 19:00)  T(F): 98.2 (19 Dec 2023 03:00), Max: 98.3 (18 Dec 2023 23:00)  HR: 73 (19 Dec 2023 07:00) (72 - 86)  RR: 16 (19 Dec 2023 07:00) (12 - 24)  SpO2: 97% (19 Dec 2023 06:00) (97% - 99%)    O2 Parameters below as of 19 Dec 2023 06:00  Patient On (Oxygen Delivery Method): room air      POCT Blood Glucose.: 122 mg/dL (19 Dec 2023 07:32)  POCT Blood Glucose.: 127 mg/dL (18 Dec 2023 22:02)  POCT Blood Glucose.: 201 mg/dL (18 Dec 2023 18:13)  POCT Blood Glucose.: 142 mg/dL (18 Dec 2023 11:57)  POCT Blood Glucose.: 123 mg/dL (18 Dec 2023 08:29)    CAPILLARY BLOOD GLUCOSE      POCT Blood Glucose.: 122 mg/dL (19 Dec 2023 07:32)    I&O's Summary    18 Dec 2023 07:01  -  19 Dec 2023 07:00  --------------------------------------------------------  IN: 1076 mL / OUT: 1650 mL / NET: -574 mL      Daily     Daily     PHYSICAL EXAMINATION:  General: WN/WD NAD  HEENT: PERRLA, EOMI  Neurology: A&Ox3, nonfocal, MOISE x 4  Respiratory: CTA B/L, normal respiratory effort, no wheezes, crackles, rales  CV: RRR, S1S2  Abdominal: Soft, NT, ND +BS,  Extremities: No edema, +IABP      LABS:                          11.8   8.24  )-----------( 193      ( 19 Dec 2023 01:56 )             35.7     12-19    132<L>  |  104  |  30<H>  ----------------------------<  141<H>  4.1   |  17<L>  |  1.03    Ca    9.7      19 Dec 2023 01:56  Phos  3.6     12-19  Mg     1.8     12-19    TPro  6.7  /  Alb  3.8  /  TBili  0.3  /  DBili  x   /  AST  27  /  ALT  75<H>  /  AlkPhos  60  12-19    LIVER FUNCTIONS - ( 19 Dec 2023 01:56 )  Alb: 3.8 g/dL / Pro: 6.7 g/dL / ALK PHOS: 60 U/L / ALT: 75 U/L / AST: 27 U/L / GGT: x           PT/INR - ( 19 Dec 2023 01:56 )   PT: 12.1 sec;   INR: 1.16 ratio         PTT - ( 19 Dec 2023 01:56 )  PTT:78.9 sec    Urinalysis Basic - ( 19 Dec 2023 01:56 )       PATIENT:  DEVORAH VALENCIA  [id number]    CHIEF COMPLAINT:  Patient is a 58y old  Male who presents with a chief complaint of Cardiogenic shock (18 Dec 2023 20:27)      INTERVAL HISTORY/OVERNIGHT EVENTS:  The patient was seen and examined at bedside.     REVIEW OF SYSTEMS:  GENERAL: No fever or chills  EYES: No change in vision  HEENT: No trouble swallowing or speaking  CARDIAC: No chest pain  PULMONARY: No cough or SOB  GI: No abdominal pain, no nausea or no vomiting  : No changes in urination  NEURO: No headache, no numbness  MSK: No joint pain    MEDICATIONS:  MEDICATIONS  (STANDING):  aspirin  chewable 81 milliGRAM(s) Oral daily  atorvastatin 80 milliGRAM(s) Oral at bedtime  bisacodyl 5 milliGRAM(s) Oral every 12 hours  budesonide  80 MICROgram(s)/formoterol 4.5 MICROgram(s) Inhaler 2 Puff(s) Inhalation two times a day  carvedilol 25 milliGRAM(s) Oral every 12 hours  chlorhexidine 2% Cloths 1 Application(s) Topical daily  heparin  Infusion 1300 Unit(s)/Hr (14 mL/Hr) IV Continuous <Continuous>  hydrALAZINE 50 milliGRAM(s) Oral every 8 hours  insulin glargine Injectable (LANTUS) 12 Unit(s) SubCutaneous at bedtime  insulin lispro (ADMELOG) corrective regimen sliding scale   SubCutaneous three times a day before meals  insulin lispro (ADMELOG) corrective regimen sliding scale   SubCutaneous at bedtime  insulin lispro Injectable (ADMELOG) 9 Unit(s) SubCutaneous three times a day before meals  isosorbide   dinitrate Tablet (ISORDIL) 30 milliGRAM(s) Oral three times a day  polyethylene glycol 3350 17 Gram(s) Oral two times a day  senna 2 Tablet(s) Oral at bedtime    MEDICATIONS  (PRN):  hydrOXYzine hydrochloride 25 milliGRAM(s) Oral two times a day PRN Anxiety      ALLERGIES:  Allergies    penicillins (Unknown)    Intolerances    OBJECTIVE:  ICU Vital Signs Last 24 Hrs  T(C): 36.8 (19 Dec 2023 03:00), Max: 36.8 (18 Dec 2023 19:00)  T(F): 98.2 (19 Dec 2023 03:00), Max: 98.3 (18 Dec 2023 23:00)  HR: 73 (19 Dec 2023 07:00) (72 - 86)  RR: 16 (19 Dec 2023 07:00) (12 - 24)  SpO2: 97% (19 Dec 2023 06:00) (97% - 99%)    O2 Parameters below as of 19 Dec 2023 06:00  Patient On (Oxygen Delivery Method): room air      POCT Blood Glucose.: 122 mg/dL (19 Dec 2023 07:32)  POCT Blood Glucose.: 127 mg/dL (18 Dec 2023 22:02)  POCT Blood Glucose.: 201 mg/dL (18 Dec 2023 18:13)  POCT Blood Glucose.: 142 mg/dL (18 Dec 2023 11:57)  POCT Blood Glucose.: 123 mg/dL (18 Dec 2023 08:29)    CAPILLARY BLOOD GLUCOSE      POCT Blood Glucose.: 122 mg/dL (19 Dec 2023 07:32)    I&O's Summary    18 Dec 2023 07:01  -  19 Dec 2023 07:00  --------------------------------------------------------  IN: 1076 mL / OUT: 1650 mL / NET: -574 mL      Daily     Daily     PHYSICAL EXAMINATION:  General: WN/WD NAD  HEENT: PERRLA, EOMI  Neurology: A&Ox3, nonfocal, MOISE x 4  Respiratory: CTA B/L, normal respiratory effort, no wheezes, crackles, rales  CV: RRR, S1S2  Abdominal: Soft, NT, ND +BS,  Extremities: No edema, +IABP      LABS:                          11.8   8.24  )-----------( 193      ( 19 Dec 2023 01:56 )             35.7     12-19    132<L>  |  104  |  30<H>  ----------------------------<  141<H>  4.1   |  17<L>  |  1.03    Ca    9.7      19 Dec 2023 01:56  Phos  3.6     12-19  Mg     1.8     12-19    TPro  6.7  /  Alb  3.8  /  TBili  0.3  /  DBili  x   /  AST  27  /  ALT  75<H>  /  AlkPhos  60  12-19    LIVER FUNCTIONS - ( 19 Dec 2023 01:56 )  Alb: 3.8 g/dL / Pro: 6.7 g/dL / ALK PHOS: 60 U/L / ALT: 75 U/L / AST: 27 U/L / GGT: x           PT/INR - ( 19 Dec 2023 01:56 )   PT: 12.1 sec;   INR: 1.16 ratio         PTT - ( 19 Dec 2023 01:56 )  PTT:78.9 sec    Urinalysis Basic - ( 19 Dec 2023 01:56 )       PATIENT:  DEVORAH VALENCIA  [id number]    CHIEF COMPLAINT:  Patient is a 58y old  Male who presents with a chief complaint of Cardiogenic shock (18 Dec 2023 20:27)      INTERVAL HISTORY/OVERNIGHT EVENTS:  The patient was seen and examined at bedside.     REVIEW OF SYSTEMS:  GENERAL: No fever or chills  EYES: No change in vision  HEENT: No trouble swallowing or speaking  CARDIAC: No chest pain  PULMONARY: No cough or SOB  GI: No abdominal pain, no nausea or no vomiting  : No changes in urination  NEURO: No headache, no numbness  MSK: No joint pain    MEDICATIONS:  MEDICATIONS  (STANDING):  aspirin  chewable 81 milliGRAM(s) Oral daily  atorvastatin 80 milliGRAM(s) Oral at bedtime  bisacodyl 5 milliGRAM(s) Oral every 12 hours  budesonide  80 MICROgram(s)/formoterol 4.5 MICROgram(s) Inhaler 2 Puff(s) Inhalation two times a day  carvedilol 25 milliGRAM(s) Oral every 12 hours  chlorhexidine 2% Cloths 1 Application(s) Topical daily  heparin  Infusion 1300 Unit(s)/Hr (14 mL/Hr) IV Continuous <Continuous>  hydrALAZINE 50 milliGRAM(s) Oral every 8 hours  insulin glargine Injectable (LANTUS) 12 Unit(s) SubCutaneous at bedtime  insulin lispro (ADMELOG) corrective regimen sliding scale   SubCutaneous three times a day before meals  insulin lispro (ADMELOG) corrective regimen sliding scale   SubCutaneous at bedtime  insulin lispro Injectable (ADMELOG) 9 Unit(s) SubCutaneous three times a day before meals  isosorbide   dinitrate Tablet (ISORDIL) 30 milliGRAM(s) Oral three times a day  polyethylene glycol 3350 17 Gram(s) Oral two times a day  senna 2 Tablet(s) Oral at bedtime    MEDICATIONS  (PRN):  hydrOXYzine hydrochloride 25 milliGRAM(s) Oral two times a day PRN Anxiety      ALLERGIES:  Allergies    penicillins (Unknown)    Intolerances    OBJECTIVE:  ICU Vital Signs Last 24 Hrs  T(C): 36.8 (19 Dec 2023 03:00), Max: 36.8 (18 Dec 2023 19:00)  T(F): 98.2 (19 Dec 2023 03:00), Max: 98.3 (18 Dec 2023 23:00)  HR: 73 (19 Dec 2023 07:00) (72 - 86)  RR: 16 (19 Dec 2023 07:00) (12 - 24)  SpO2: 97% (19 Dec 2023 06:00) (97% - 99%)    O2 Parameters below as of 19 Dec 2023 06:00  Patient On (Oxygen Delivery Method): room air      POCT Blood Glucose.: 122 mg/dL (19 Dec 2023 07:32)  POCT Blood Glucose.: 127 mg/dL (18 Dec 2023 22:02)  POCT Blood Glucose.: 201 mg/dL (18 Dec 2023 18:13)  POCT Blood Glucose.: 142 mg/dL (18 Dec 2023 11:57)  POCT Blood Glucose.: 123 mg/dL (18 Dec 2023 08:29)    CAPILLARY BLOOD GLUCOSE      POCT Blood Glucose.: 122 mg/dL (19 Dec 2023 07:32)    I&O's Summary    18 Dec 2023 07:01  -  19 Dec 2023 07:00  --------------------------------------------------------  IN: 1076 mL / OUT: 1650 mL / NET: -574 mL      Daily     Daily     PHYSICAL EXAMINATION:  General: WN/WD NAD  HEENT: PERRLA, EOMI  Neurology: A&Ox3, nonfocal, MOISE x 4  Respiratory: CTA B/L, normal respiratory effort, no wheezes, crackles, rales  CV: RRR, S1S2  Abdominal: Soft, NT, ND +BS,  Extremities: No edema, +IABP, no bleeding, no hematoma, pulse DP intact       LABS:                          11.8   8.24  )-----------( 193      ( 19 Dec 2023 01:56 )             35.7     12-19    132<L>  |  104  |  30<H>  ----------------------------<  141<H>  4.1   |  17<L>  |  1.03    Ca    9.7      19 Dec 2023 01:56  Phos  3.6     12-19  Mg     1.8     12-19    TPro  6.7  /  Alb  3.8  /  TBili  0.3  /  DBili  x   /  AST  27  /  ALT  75<H>  /  AlkPhos  60  12-19    LIVER FUNCTIONS - ( 19 Dec 2023 01:56 )  Alb: 3.8 g/dL / Pro: 6.7 g/dL / ALK PHOS: 60 U/L / ALT: 75 U/L / AST: 27 U/L / GGT: x           PT/INR - ( 19 Dec 2023 01:56 )   PT: 12.1 sec;   INR: 1.16 ratio         PTT - ( 19 Dec 2023 01:56 )  PTT:78.9 sec    Urinalysis Basic - ( 19 Dec 2023 01:56 )       PATIENT:  DEVORAH VALENCIA  [id number]    CHIEF COMPLAINT:  Patient is a 58y old  Male who presents with a chief complaint of Cardiogenic shock (18 Dec 2023 20:27)      INTERVAL HISTORY/OVERNIGHT EVENTS:  The patient was seen and examined at bedside.     REVIEW OF SYSTEMS:  GENERAL: No fever or chills  EYES: No change in vision  HEENT: No trouble swallowing or speaking  CARDIAC: No chest pain  PULMONARY: No cough or SOB  GI: No abdominal pain, no nausea or no vomiting  : No changes in urination  NEURO: No headache, no numbness  MSK: No joint pain    MEDICATIONS:  MEDICATIONS  (STANDING):  aspirin  chewable 81 milliGRAM(s) Oral daily  atorvastatin 80 milliGRAM(s) Oral at bedtime  bisacodyl 5 milliGRAM(s) Oral every 12 hours  budesonide  80 MICROgram(s)/formoterol 4.5 MICROgram(s) Inhaler 2 Puff(s) Inhalation two times a day  carvedilol 25 milliGRAM(s) Oral every 12 hours  chlorhexidine 2% Cloths 1 Application(s) Topical daily  heparin  Infusion 1300 Unit(s)/Hr (14 mL/Hr) IV Continuous <Continuous>  hydrALAZINE 50 milliGRAM(s) Oral every 8 hours  insulin glargine Injectable (LANTUS) 12 Unit(s) SubCutaneous at bedtime  insulin lispro (ADMELOG) corrective regimen sliding scale   SubCutaneous three times a day before meals  insulin lispro (ADMELOG) corrective regimen sliding scale   SubCutaneous at bedtime  insulin lispro Injectable (ADMELOG) 9 Unit(s) SubCutaneous three times a day before meals  isosorbide   dinitrate Tablet (ISORDIL) 30 milliGRAM(s) Oral three times a day  polyethylene glycol 3350 17 Gram(s) Oral two times a day  senna 2 Tablet(s) Oral at bedtime    MEDICATIONS  (PRN):  hydrOXYzine hydrochloride 25 milliGRAM(s) Oral two times a day PRN Anxiety      ALLERGIES:  Allergies    penicillins (Unknown)    Intolerances    OBJECTIVE:  ICU Vital Signs Last 24 Hrs  T(C): 36.8 (19 Dec 2023 03:00), Max: 36.8 (18 Dec 2023 19:00)  T(F): 98.2 (19 Dec 2023 03:00), Max: 98.3 (18 Dec 2023 23:00)  HR: 73 (19 Dec 2023 07:00) (72 - 86)  RR: 16 (19 Dec 2023 07:00) (12 - 24)  SpO2: 97% (19 Dec 2023 06:00) (97% - 99%)    O2 Parameters below as of 19 Dec 2023 06:00  Patient On (Oxygen Delivery Method): room air      POCT Blood Glucose.: 122 mg/dL (19 Dec 2023 07:32)  POCT Blood Glucose.: 127 mg/dL (18 Dec 2023 22:02)  POCT Blood Glucose.: 201 mg/dL (18 Dec 2023 18:13)  POCT Blood Glucose.: 142 mg/dL (18 Dec 2023 11:57)  POCT Blood Glucose.: 123 mg/dL (18 Dec 2023 08:29)    CAPILLARY BLOOD GLUCOSE      POCT Blood Glucose.: 122 mg/dL (19 Dec 2023 07:32)    I&O's Summary    18 Dec 2023 07:01  -  19 Dec 2023 07:00  --------------------------------------------------------  IN: 1076 mL / OUT: 1650 mL / NET: -574 mL      Daily     Daily     PHYSICAL EXAMINATION:  General: WN/WD NAD  HEENT: PERRLA, EOMI  Neurology: A&Ox3, nonfocal, MOISE x 4  Respiratory: CTA B/L, normal respiratory effort, no wheezes, crackles, rales  CV: RRR, S1S2  Abdominal: Soft, NT, ND +BS,  Extremities: No edema, +IABP, no bleeding, no hematoma, pulse DP intact       LABS:                          11.8   8.24  )-----------( 193      ( 19 Dec 2023 01:56 )             35.7     12-19    132<L>  |  104  |  30<H>  ----------------------------<  141<H>  4.1   |  17<L>  |  1.03    Ca    9.7      19 Dec 2023 01:56  Phos  3.6     12-19  Mg     1.8     12-19    TPro  6.7  /  Alb  3.8  /  TBili  0.3  /  DBili  x   /  AST  27  /  ALT  75<H>  /  AlkPhos  60  12-19    LIVER FUNCTIONS - ( 19 Dec 2023 01:56 )  Alb: 3.8 g/dL / Pro: 6.7 g/dL / ALK PHOS: 60 U/L / ALT: 75 U/L / AST: 27 U/L / GGT: x           PT/INR - ( 19 Dec 2023 01:56 )   PT: 12.1 sec;   INR: 1.16 ratio         PTT - ( 19 Dec 2023 01:56 )  PTT:78.9 sec    Urinalysis Basic - ( 19 Dec 2023 01:56 )

## 2023-12-19 NOTE — PROGRESS NOTE ADULT - ATTENDING COMMENTS
57yo M with ischemic HFrEF, ADHF and cardiogenic shock awaiting heart transplant (status 2)  Neuro: no deficits, prn atarax for anxiety  Pulm: sating well on RA, prn albuterol and symbicort  CV: cardiogenic shock on IABP 1:1, cont afterload reduction with coreg, hydral, isordil  Renal: Cr under 1, no issues  GI: DASH diet  Heme: H/H stable, on heparin gtt for LV thrombus and IABP.   ID: no active issues  Endo: BG controlled  Derm: Hepatitis immunization induced rash, improving on topical steroids  Lines: IABP (11/20), RRA (12/14) 59yo M with ischemic HFrEF, ADHF and cardiogenic shock awaiting heart transplant (status 2)  Neuro: no deficits, prn atarax for anxiety  Pulm: sating well on RA, prn albuterol and symbicort  CV: cardiogenic shock on IABP 1:1, cont afterload reduction with coreg, hydral, isordil  Renal: Cr under 1, no issues  GI: DASH diet  Heme: H/H stable, on heparin gtt for LV thrombus and IABP.   ID: no active issues  Endo: BG controlled  Derm: Hepatitis immunization induced rash, improving on topical steroids  Lines: IABP (11/20), RRA (12/14)

## 2023-12-19 NOTE — PROGRESS NOTE ADULT - CRITICAL CARE ATTENDING COMMENT
meds; heparin (79), coreg 25 bid, HDZN 50 tid, ISDN 30 tid, Asa, statin, insulin,   IABP 1:1 aug mean 70-80, aug diast 110, HR SR 70-80,   I/o: -570.   12-19    132<L>  |  104  |  30<H>  ----------------------------<  141<H>  4.1   |  17<L>  |  1.03    Ca    9.7      19 Dec 2023 01:56  Phos  3.6     12-19  Mg     1.8     12-19    TPro  6.7  /  Alb  3.8  /  TBili  0.3  /  DBili  x   /  AST  27  /  ALT  75<H>  /  AlkPhos  60  12-19                        11.8   8.24  )-----------( 193      ( 19 Dec 2023 01:56 )             35.7   DCD donor from yesterday on hold, due to reassessment of brain function.   Plan;   continue current level of support.   needs bedside PT.   US left neck.   remains UNOS Status II  ABO A PRA Brody GONCALVES

## 2023-12-19 NOTE — PROGRESS NOTE ADULT - ASSESSMENT
57 yo male h/o htn, cad s/p pci, ICH, here with NSTEMI  s/p intubation and cath. now in CCU    NSTEMI  s/p  cath  cath results noted. multi vessel dz., CTSx f/u.   hep gtt  pt with vtach/fib arrest again on 11/8. s/p ACLS and ROSC.   now extubated  IABP in place  mngt as per CCU  plan for transplant as per HF and transplant team  transplant w/u ongoing.   s/p colonoscopy 11/17. normal  now listed for transplant as of 11/22    LV thrombus   hep gtt    acute on chronic systolic heart failure  heart failure team f/u    pna  resolved     covid  resolved  now off isolation     h/o ICH  resolved    agitation  psy consult f/u    bacteremia  id following  iv abx  f/u repeat cult  - ngtd    vomiting and abd pain  ileus on CT  bowel regimen  gi f/u  +bm    now resolved.     IABP malposition on CT  mngt as per ccu. iabp adjusted    rash  possible drug reaction  derm f/u  improving    mngt as per CCU team          Advanced care planning was discussed with patient and family.  Advanced care planning forms were reviewed and discussed as appropriate.  Differential diagnosis and plan of care discussed with patient after the evaluation.   Pain assessed and judicious use of narcotics when appropriate was discussed.  Importance of Fall prevention discussed.  Counseling on Smoking and Alcohol cessation was offered when appropriate.  Counseling on Diet, exercise, and medication compliance was done.       Approx 75 minutes spent.

## 2023-12-19 NOTE — PROGRESS NOTE ADULT - ASSESSMENT
· Assessment	  58 male with HTN, CAD (s/p PCI 2008), HFrEF, CVA 2018, and T2DM presenting with chest pressure and unknown tachycardia that was shocked x1, C 11/1 found to have in-stent restenosis of pLAD and  of RCA with elevated RA and PA pressures and severely decreased. Admitted to CICU for management of cardiogenic shock and ADHF requiring IABP 11/1 -11/7, with hospital course c/b vfib arrest requiring reinsertion of IABP. Not recommended for ATs per HF. Currently listed for transplant status 2.    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 AT evaluation launched 11/10, he is ABO A. He was discussed during TSC on 11/16 and was approved for listing, however was found to be Bacteremic with 11/17 Blc Cx growing mixed cultures Staph epi and Enterococcus faecalis and started on IV Vanco. Repeat cultures from 11/18 reveal NGTD and therefore was cleared by ID for listing.     He appears adequately supported on IABP at 1:1, electrically quiescent on oral Amiodarone. Cr is improving with holding diuretics. Labs otherwise notable for worsening thrombocytopenia. Awaiting suitable donor. Now with GM + rods in blood culture on 11/30 (reported on 12/4), restarted on vancomycin, and status 7, repeat cultures now negative x48 hours (12/6), now status 2 again.       ====================== NEUROLOGY=====================  Anxiety  - No Seroquel/antipsychotics since vfib arrest and prolonged QTC  - Psych Eval, recommended SSRI, but pt. refused   - Psych recommending atarax PRN  - Continue to monitor mental status    PT/Conditioning  - Continue band exercises while on bedrest s/t IABP    ==================== RESPIRATORY======================  Acute Hypoxemic Respiratory Failure  - s/p x2 intubations for cardiogenic pulm edema and the in setting of cardiac arrest, resolved - extubated 11/10  - Currently maintaining >95% sats on room air  - Continue incentive spirometry and monitoring of sp02    Asthma  - c/w albuterol, symbicort and spiriva  - On trelegy at home  - Continue to monitor SpO2 with goal >94%    ====================CARDIOVASCULAR==================  Vfib arrest i/s/o ischemia  - Lido gtt off 11/13  - PO Amio load - total of 5g per EP complete 11/17  - Amio dc'd 12/5 for rising LFTs  - Keep K > 4, Mag > 2.2     Cardiogenic shock requiring IABP (11/1- 11/7, 11/9-)  - Likely 2/2 NSTEMI and ADHF  - 11/1 LHC: pLAD 100 % in-stent restenosis & mRCA, 100 %. PCWP 30. IABP placed.  - 11/1 TTE: LV dilated. EF 32 %. Regional WMAs present, mod (grade 2) LV diastolic dysfunction  - 11/2 TTE: EF 22% and + LV thrombus  - IABP swapped 11/20 to RFA, continue 1:1 support - switched sensor to R radial a-line 12/13 due to poor sensing on groin line  - Off Milrinone gtt @ 7:30 am 11/11  - James d/c'd due to elevated K levels  - c/w coreg 25 BID for GDMT  - UNOS status 2 as of 12/6, pending DCD  - hydralazine 50 TID and ISD 30 TID for AL reduction    NSTEMI iso stent re-occlusion of pLAD and 100%  of RCA  - EKG on admission w/ LBBB  - DAPT: c/w ASA, Brilinta d/c'd per transplant w/u  - c/w lipitor 80  - cMR deferred given necessity of IABP  - CT sx not recommending CABG, undergoing AT eval    LV thrombus  - c/w heparin gtt w/ therapeutic PTT    ===================== RENAL =========================  Non-oliguric ARIC, resolved   - Baseline Cr: 1-1.22  - Renal US: no evidence of renal artery stenosis  - Trend BMP, lytes daily, replace as needed  - Continue Strict I/Os, avoid nephrotoxins    Mild Hyponatremia/Hyperkalemia iso ARIC  - Continue fluid restriction   - Urea powder d/c'd per renal  - Trend daily  - Continue monitoring urine output, lytes, SCr/ BUN  - Replete lytes prn with goal K >4 and Mg >2    =============== GASTROINTESTINAL===================  Constipation/ileus, resolved  - regular diet  - bowel reg prn    ===================ENDO====================  Type 2 DM  - A1c 8.3   - Continue lantus, premeal, low ISS  - continue FS    ===================HEMATOLOGIC/ONC ===================  - H/H & plts stable  - Monitor H/H and plts  - VTE PPX: heparin gtt    ==================INFECTIOUS DISEASE================  # Positive blood culture, resolved  - Repeat BCx drawn 11/30 for leukocytosis to 12k - positive on 12/4, G+ rods in anaerobic bottle, suspect contaminant  - Repeat cultures x2 sent 12/4 per transplant ID recs - no growth to date  - Vancomycin by trough (12/4-12/6)  - Final microbial ID: Cutibacterium acnes, per ID this is likely to be a skin contaminant, vanc dc'd.   - Dental eval 12/7 to rule out infectious etiology that would have led to bacteremia, no significant findings on exam.     # Enterococcus faecalis bacteremia, resolved  - BCx 11/17+ for enterococcus faecalis x2, Staph epi x1 (likely contaminant)- pan sensitive   - Urine cx 11/18 + enterococcus faecalis  - BCx 11/18 no growth   - IABP site swapped to RFA 11/20  - s/p Vancomycin 1g q12h (11/18-11/27)  - CT A/P negative for infectious pathology    # COVID, resolved  - Off airborne precautions 11/11    # Pre-transplant ID w/u   - Trend ID recs for serologies   - Colonoscopy 11/17 - normal   - Chest CT 11/17 - improved LLL aeration  - s/p immunizations    ==================DERMATOLOGY================  # Upper Extremity Rash  - Patient developed bilateral upper extremity rash after receiving Hepatitis A & B immunizations on 11/24  - Dermatology consulted 12/5 - not likely to be related to vanco  - Recommend clobetasol 0.05% BID to affected areas   - RVP repeated to rule out viral etiology, negative    #?Fluctuant mass  -US soft tissue pending      Lines: IABP (11/20), RRA (12/14)   · Assessment	  58 male with HTN, CAD (s/p PCI 2008), HFrEF, CVA 2018, and T2DM presenting with chest pressure and unknown tachycardia that was shocked x1, C 11/1 found to have in-stent restenosis of pLAD and  of RCA with elevated RA and PA pressures and severely decreased. Admitted to CICU for management of cardiogenic shock and ADHF requiring IABP 11/1 -11/7, with hospital course c/b vfib arrest requiring reinsertion of IABP. Not recommended for ATs per HF. Currently listed for transplant status 2.    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 AT evaluation launched 11/10, he is ABO A. He was discussed during TSC on 11/16 and was approved for listing, however was found to be Bacteremic with 11/17 Blc Cx growing mixed cultures Staph epi and Enterococcus faecalis and started on IV Vanco. Repeat cultures from 11/18 reveal NGTD and therefore was cleared by ID for listing.     He appears adequately supported on IABP at 1:1, electrically quiescent on oral Amiodarone. Cr is improving with holding diuretics. Labs otherwise notable for worsening thrombocytopenia. Awaiting suitable donor. Now with GM + rods in blood culture on 11/30 (reported on 12/4), restarted on vancomycin, and status 7, repeat cultures now negative x48 hours (12/6), now status 2 again.       ====================== NEUROLOGY=====================  Anxiety  - No Seroquel/antipsychotics since vfib arrest and prolonged QTC  - Psych Eval, recommended SSRI, but pt. refused   - Psych recommending atarax PRN  - Continue to monitor mental status    PT/Conditioning  - Continue band exercises while on bedrest s/t IABP    ==================== RESPIRATORY======================  Acute Hypoxemic Respiratory Failure  - s/p x2 intubations for cardiogenic pulm edema and the in setting of cardiac arrest, resolved - extubated 11/10  - Currently maintaining >95% sats on room air  - Continue incentive spirometry and monitoring of sp02    Asthma  - c/w albuterol, symbicort and spiriva  - On trelegy at home  - Continue to monitor SpO2 with goal >94%    ====================CARDIOVASCULAR==================  Vfib arrest i/s/o ischemia  - Lido gtt off 11/13  - PO Amio load - total of 5g per EP complete 11/17  - Amio dc'd 12/5 for rising LFTs  - Keep K > 4, Mag > 2.2     Cardiogenic shock requiring IABP (11/1- 11/7, 11/9-)  - Likely 2/2 NSTEMI and ADHF  - 11/1 LHC: pLAD 100 % in-stent restenosis & mRCA, 100 %. PCWP 30. IABP placed.  - 11/1 TTE: LV dilated. EF 32 %. Regional WMAs present, mod (grade 2) LV diastolic dysfunction  - 11/2 TTE: EF 22% and + LV thrombus  - IABP swapped 11/20 to RFA, continue 1:1 support - switched sensor to R radial a-line 12/13 due to poor sensing on groin line  - Off Milrinone gtt @ 7:30 am 11/11  - James d/c'd due to elevated K levels  - c/w coreg 25 BID for GDMT  - UNOS status 2 as of 12/6, pending DCD   - hydralazine 50 TID and ISD 30 TID for AL reduction    NSTEMI iso stent re-occlusion of pLAD and 100%  of RCA  - EKG on admission w/ LBBB  - DAPT: c/w ASA, Brilinta d/c'd per transplant w/u  - c/w lipitor 80  - cMR deferred given necessity of IABP  - CT sx not recommending CABG, undergoing AT eval    LV thrombus  - c/w heparin gtt w/ therapeutic PTT    ===================== RENAL =========================  Non-oliguric ARIC, resolved   - Baseline Cr: 1-1.22  - Renal US: no evidence of renal artery stenosis  - Trend BMP, lytes daily, replace as needed  - Continue Strict I/Os, avoid nephrotoxins    Mild Hyponatremia/Hyperkalemia iso ARIC  - Continue fluid restriction   - Urea powder d/c'd per renal  - Trend daily  - Continue monitoring urine output, lytes, SCr/ BUN  - Replete lytes prn with goal K >4 and Mg >2    =============== GASTROINTESTINAL===================  Constipation/ileus, resolved  - regular diet  - bowel reg prn    ===================ENDO====================  Type 2 DM  - A1c 8.3   - Continue lantus, premeal, low ISS  - continue FS    ===================HEMATOLOGIC/ONC ===================  - H/H & plts stable  - Monitor H/H and plts  - VTE PPX: heparin gtt    ==================INFECTIOUS DISEASE================  # Positive blood culture, resolved  - Repeat BCx drawn 11/30 for leukocytosis to 12k - positive on 12/4, G+ rods in anaerobic bottle, suspect contaminant  - Repeat cultures x2 sent 12/4 per transplant ID recs - no growth to date  - Vancomycin by trough (12/4-12/6)  - Final microbial ID: Cutibacterium acnes, per ID this is likely to be a skin contaminant, vanc dc'd.   - Dental eval 12/7 to rule out infectious etiology that would have led to bacteremia, no significant findings on exam.     # Enterococcus faecalis bacteremia, resolved  - BCx 11/17+ for enterococcus faecalis x2, Staph epi x1 (likely contaminant)- pan sensitive   - Urine cx 11/18 + enterococcus faecalis  - BCx 11/18 no growth   - IABP site swapped to RFA 11/20  - s/p Vancomycin 1g q12h (11/18-11/27)  - CT A/P negative for infectious pathology    # COVID, resolved  - Off airborne precautions 11/11    # Pre-transplant ID w/u   - Trend ID recs for serologies   - Colonoscopy 11/17 - normal   - Chest CT 11/17 - improved LLL aeration  - s/p immunizations    ==================DERMATOLOGY================  # Upper Extremity Rash  - Patient developed bilateral upper extremity rash after receiving Hepatitis A & B immunizations on 11/24  - Dermatology consulted 12/5 - not likely to be related to vanco  - Recommend clobetasol 0.05% BID to affected areas   - RVP repeated to rule out viral etiology, negative    #?Fluctuant mass  -US soft tissue pending      Lines: IABP (11/20), RRA (12/14)   · Assessment	  58 male with HTN, CAD (s/p PCI 2008), HFrEF, CVA 2018, and T2DM presenting with chest pressure and unknown tachycardia that was shocked x1, C 11/1 found to have in-stent restenosis of pLAD and  of RCA with elevated RA and PA pressures and severely decreased. Admitted to CICU for management of cardiogenic shock and ADHF requiring IABP 11/1 -11/7, with hospital course c/b vfib arrest requiring reinsertion of IABP. Not recommended for ATs per HF. Currently listed for transplant status 2.    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 AT evaluation launched 11/10, he is ABO A. He was discussed during TSC on 11/16 and was approved for listing, however was found to be Bacteremic with 11/17 Blc Cx growing mixed cultures Staph epi and Enterococcus faecalis and started on IV Vanco. Repeat cultures from 11/18 reveal NGTD and therefore was cleared by ID for listing.     He appears adequately supported on IABP at 1:1, electrically quiescent on oral Amiodarone. Cr is improving with holding diuretics. Labs otherwise notable for worsening thrombocytopenia. Awaiting suitable donor. Now with GM + rods in blood culture on 11/30 (reported on 12/4), restarted on vancomycin, and status 7, repeat cultures now negative x48 hours (12/6), now status 2 again.       ====================== NEUROLOGY=====================  Anxiety  - No Seroquel/antipsychotics since vfib arrest and prolonged QTC  - Psych Eval, recommended SSRI, but pt. refused   - Psych recommending atarax PRN  - Continue to monitor mental status    PT/Conditioning  - Continue band exercises while on bedrest s/t IABP    ==================== RESPIRATORY======================  Acute Hypoxemic Respiratory Failure  - s/p x2 intubations for cardiogenic pulm edema and the in setting of cardiac arrest, resolved - extubated 11/10  - Currently maintaining >95% sats on room air  - Continue incentive spirometry and monitoring of sp02    Asthma  - c/w albuterol, symbicort and spiriva  - On trelegy at home  - Continue to monitor SpO2 with goal >94%    ====================CARDIOVASCULAR==================  Vfib arrest i/s/o ischemia  - Lido gtt off 11/13  - PO Amio load - total of 5g per EP complete 11/17  - Amio dc'd 12/5 for rising LFTs  - Keep K > 4, Mag > 2.2     Cardiogenic shock requiring IABP (11/1- 11/7, 11/9-)  - Likely 2/2 NSTEMI and ADHF  - 11/1 LHC: pLAD 100 % in-stent restenosis & mRCA, 100 %. PCWP 30. IABP placed.  - 11/1 TTE: LV dilated. EF 32 %. Regional WMAs present, mod (grade 2) LV diastolic dysfunction  - 11/2 TTE: EF 22% and + LV thrombus  - IABP swapped 11/20 to RFA, continue 1:1 support - switched sensor to R radial a-line 12/13 due to poor sensing on groin line  - Off Milrinone gtt @ 7:30 am 11/11  - James d/c'd due to elevated K levels  - c/w coreg 25 BID for GDMT  - UNOS status 2 as of 12/6, pending DCD   - hydralazine 50 TID and ISD 30 TID for AL reduction    NSTEMI iso stent re-occlusion of pLAD and 100%  of RCA  - EKG on admission w/ LBBB  - DAPT: c/w ASA, Brilinta d/c'd per transplant w/u  - c/w lipitor 80  - cMR deferred given necessity of IABP  - CT sx not recommending CABG, undergoing AT eval    LV thrombus  - c/w heparin gtt w/ therapeutic PTT    ===================== RENAL =========================  Non-oliguric ARIC, resolved   - Baseline Cr: 1-1.22  - Renal US: no evidence of renal artery stenosis  - Trend BMP, lytes daily, replace as needed  - Continue Strict I/Os, avoid nephrotoxins    Mild Hyponatremia/Hyperkalemia iso ARIC  - Continue fluid restriction   - Urea powder d/c'd per renal  - Trend daily  - Continue monitoring urine output, lytes, SCr/ BUN  - Replete lytes prn with goal K >4 and Mg >2    =============== GASTROINTESTINAL===================  Constipation/ileus, resolved  - regular diet  - bowel reg prn    ===================ENDO====================  Type 2 DM  - A1c 8.3   - Continue lantus, premeal, low ISS  - continue FS    ===================HEMATOLOGIC/ONC ===================  - H/H & plts stable  - Monitor H/H and plts  - VTE PPX: heparin gtt    ==================INFECTIOUS DISEASE================  # Positive blood culture, resolved  - Repeat BCx drawn 11/30 for leukocytosis to 12k - positive on 12/4, G+ rods in anaerobic bottle, suspect contaminant  - Repeat cultures x2 sent 12/4 per transplant ID recs - no growth to date  - Vancomycin by trough (12/4-12/6)  - Final microbial ID: Cutibacterium acnes, per ID this is likely to be a skin contaminant, vanc dc'd.   - Dental eval 12/7 to rule out infectious etiology that would have led to bacteremia, no significant findings on exam.     # Enterococcus faecalis bacteremia, resolved  - BCx 11/17+ for enterococcus faecalis x2, Staph epi x1 (likely contaminant)- pan sensitive   - Urine cx 11/18 + enterococcus faecalis  - BCx 11/18 no growth   - IABP site swapped to RFA 11/20  - s/p Vancomycin 1g q12h (11/18-11/27)  - CT A/P negative for infectious pathology    # COVID, resolved  - Off airborne precautions 11/11    # Pre-transplant ID w/u   - Trend ID recs for serologies   - Colonoscopy 11/17 - normal   - Chest CT 11/17 - improved LLL aeration  - s/p immunizations    ==================DERMATOLOGY================  # Upper Extremity Rash  - Patient developed bilateral upper extremity rash after receiving Hepatitis A & B immunizations on 11/24  - Dermatology consulted 12/5 - not likely to be related to vanco  - Recommend clobetasol 0.05% BID to affected areas   - RVP repeated to rule out viral etiology, negative    # ?Left tissue mass  - US soft tissue pending      Lines: IABP (11/20), RRA (12/14)   · Assessment	  58 male with HTN, CAD (s/p PCI 2008), HFrEF, CVA 2018, and T2DM presenting with chest pressure and unknown tachycardia that was shocked x1, C 11/1 found to have in-stent restenosis of pLAD and  of RCA with elevated RA and PA pressures and severely decreased. Admitted to CICU for management of cardiogenic shock and ADHF requiring IABP 11/1 -11/7, with hospital course c/b vfib arrest requiring reinsertion of IABP. Not recommended for ATs per HF. Currently listed for transplant status 2.    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 AT evaluation launched 11/10, he is ABO A. He was discussed during TSC on 11/16 and was approved for listing, however was found to be Bacteremic with 11/17 Blc Cx growing mixed cultures Staph epi and Enterococcus faecalis and started on IV Vanco. Repeat cultures from 11/18 reveal NGTD and therefore was cleared by ID for listing.     He appears adequately supported on IABP at 1:1, electrically quiescent on oral Amiodarone. Cr is improving with holding diuretics. Labs otherwise notable for worsening thrombocytopenia. Awaiting suitable donor. Now with GM + rods in blood culture on 11/30 (reported on 12/4), restarted on vancomycin, and status 7, repeat cultures now negative x48 hours (12/6), now status 2 again.       ====================== NEUROLOGY=====================  Anxiety  - No Seroquel/antipsychotics since vfib arrest and prolonged QTC  - Psych Eval, recommended SSRI, but pt. refused   - Psych recommending atarax PRN  - Continue to monitor mental status    PT/Conditioning  - Continue band exercises while on bedrest s/t IABP    ==================== RESPIRATORY======================  Acute Hypoxemic Respiratory Failure  - s/p x2 intubations for cardiogenic pulm edema and the in setting of cardiac arrest, resolved - extubated 11/10  - Currently maintaining >95% sats on room air  - Continue incentive spirometry and monitoring of sp02    Asthma  - c/w albuterol, symbicort and spiriva  - On trelegy at home  - Continue to monitor SpO2 with goal >94%    ====================CARDIOVASCULAR==================  Vfib arrest i/s/o ischemia  - Lido gtt off 11/13  - PO Amio load - total of 5g per EP complete 11/17  - Amio dc'd 12/5 for rising LFTs  - Keep K > 4, Mag > 2.2     Cardiogenic shock requiring IABP (11/1- 11/7, 11/9-)  - Likely 2/2 NSTEMI and ADHF  - 11/1 LHC: pLAD 100 % in-stent restenosis & mRCA, 100 %. PCWP 30. IABP placed.  - 11/1 TTE: LV dilated. EF 32 %. Regional WMAs present, mod (grade 2) LV diastolic dysfunction  - 11/2 TTE: EF 22% and + LV thrombus  - IABP swapped 11/20 to RFA, continue 1:1 support - switched sensor to R radial a-line 12/13 due to poor sensing on groin line  - Off Milrinone gtt @ 7:30 am 11/11  - James d/c'd due to elevated K levels  - c/w coreg 25 BID for GDMT  - UNOS status 2 as of 12/6, pending DCD   - hydralazine 50 TID and ISD 30 TID for AL reduction    NSTEMI iso stent re-occlusion of pLAD and 100%  of RCA  - EKG on admission w/ LBBB  - DAPT: c/w ASA, Brilinta d/c'd per transplant w/u  - c/w lipitor 80  - cMR deferred given necessity of IABP  - CT sx not recommending CABG, undergoing AT eval    LV thrombus  - c/w heparin gtt w/ therapeutic PTT    ===================== RENAL =========================  Non-oliguric ARIC, resolved   - Baseline Cr: 1-1.22  - Renal US: no evidence of renal artery stenosis  - Trend BMP, lytes daily, replace as needed  - Continue Strict I/Os, avoid nephrotoxins    =============== GASTROINTESTINAL===================  Constipation/ileus, resolved  - regular diet  - bowel reg prn    ===================ENDO====================  Type 2 DM  - A1c 8.3   - Continue lantus, premeal, low ISS  - continue FS    ===================HEMATOLOGIC/ONC ===================  - H/H & plts stable  - Monitor H/H and plts  - VTE PPX: heparin gtt    ==================INFECTIOUS DISEASE================  # Positive blood culture, resolved  - Repeat BCx drawn 11/30 for leukocytosis to 12k - positive on 12/4, G+ rods in anaerobic bottle, suspect contaminant  - Repeat cultures x2 sent 12/4 per transplant ID recs - no growth to date  - Vancomycin by trough (12/4-12/6)  - Final microbial ID: Cutibacterium acnes, per ID this is likely to be a skin contaminant, vanc dc'd.   - Dental eval 12/7 to rule out infectious etiology that would have led to bacteremia, no significant findings on exam.     # Enterococcus faecalis bacteremia, resolved  - BCx 11/17+ for enterococcus faecalis x2, Staph epi x1 (likely contaminant)- pan sensitive   - Urine cx 11/18 + enterococcus faecalis  - BCx 11/18 no growth   - IABP site swapped to RFA 11/20  - s/p Vancomycin 1g q12h (11/18-11/27)  - CT A/P negative for infectious pathology    # COVID, resolved  - Off airborne precautions 11/11    # Pre-transplant ID w/u   - Trend ID recs for serologies   - Colonoscopy 11/17 - normal   - Chest CT 11/17 - improved LLL aeration  - s/p immunizations    ==================DERMATOLOGY================  # Upper Extremity Rash  - Patient developed bilateral upper extremity rash after receiving Hepatitis A & B immunizations on 11/24  - Dermatology consulted 12/5 - not likely to be related to vanco  - Recommend clobetasol 0.05% BID to affected areas   - RVP repeated to rule out viral etiology, negative    # ?Left tissue mass  - US soft tissue pending      Lines: IABP (11/20), RRA (12/14)

## 2023-12-19 NOTE — PROGRESS NOTE ADULT - SUBJECTIVE AND OBJECTIVE BOX
ADVANCED HEART FAILURE & TRANSPLANT  - PROGRESS NOTE  *To reach the NS2 Team from 8am to 5pm, please call [phone]   ___________________________________________________________________________  Subjective:    Medications:  aspirin  chewable 81 milliGRAM(s) Oral daily  atorvastatin 80 milliGRAM(s) Oral at bedtime  bisacodyl 5 milliGRAM(s) Oral every 12 hours  budesonide  80 MICROgram(s)/formoterol 4.5 MICROgram(s) Inhaler 2 Puff(s) Inhalation two times a day  carvedilol 25 milliGRAM(s) Oral every 12 hours  chlorhexidine 2% Cloths 1 Application(s) Topical daily  heparin  Infusion 1300 Unit(s)/Hr IV Continuous <Continuous>  hydrALAZINE 50 milliGRAM(s) Oral every 8 hours  hydrOXYzine hydrochloride 25 milliGRAM(s) Oral two times a day PRN  insulin glargine Injectable (LANTUS) 12 Unit(s) SubCutaneous at bedtime  insulin lispro (ADMELOG) corrective regimen sliding scale   SubCutaneous three times a day before meals  insulin lispro (ADMELOG) corrective regimen sliding scale   SubCutaneous at bedtime  insulin lispro Injectable (ADMELOG) 9 Unit(s) SubCutaneous three times a day before meals  isosorbide   dinitrate Tablet (ISORDIL) 30 milliGRAM(s) Oral three times a day  polyethylene glycol 3350 17 Gram(s) Oral two times a day  senna 2 Tablet(s) Oral at bedtime    Physical Exam:    Vitals:  Vital Signs Last 24 Hours  T(C): 36.8 (12-19-23 @ 07:00), Max: 36.8 (12-18-23 @ 19:00)  HR: 73 (12-19-23 @ 07:00) (72 - 86)  BP: --  RR: 112 (12-19-23 @ 08:00) (12 - 112)  SpO2: 96% (12-19-23 @ 08:00) (96% - 99%)    I&O's Summary    18 Dec 2023 07:01  -  19 Dec 2023 07:00  --------------------------------------------------------  IN: 1076 mL / OUT: 1650 mL / NET: -574 mL    19 Dec 2023 07:01  -  19 Dec 2023 08:02  --------------------------------------------------------  IN: 14 mL / OUT: 0 mL / NET: 14 mL    Tele:    General: No distress. Comfortable.  HEENT: EOM intact.  Neck: Neck supple. JVP not elevated. No masses  Chest: Clear to auscultation bilaterally  CV: Normal S1 and S2. No murmurs, rub, or gallops. Radial pulses normal.  Abdomen: Soft, non-distended, non-tender  Skin: No rashes or skin breakdown  Neurology: Alert and oriented times three. Sensation intact  Psych: Affect normal    Labs:                        11.8   8.24  )-----------( 193      ( 19 Dec 2023 01:56 )             35.7     12-19    132<L>  |  104  |  30<H>  ----------------------------<  141<H>  4.1   |  17<L>  |  1.03    Ca    9.7      19 Dec 2023 01:56  Phos  3.6     12-19  Mg     1.8     12-19    TPro  6.7  /  Alb  3.8  /  TBili  0.3  /  DBili  x   /  AST  27  /  ALT  75<H>  /  AlkPhos  60  12-19    PT/INR - ( 19 Dec 2023 01:56 )   PT: 12.1 sec;   INR: 1.16 ratio       PTT - ( 19 Dec 2023 01:56 )  PTT:78.9 sec    Lactate, Blood: 0.9 mmol/L (12-17 @ 01:14)   ADVANCED HEART FAILURE & TRANSPLANT  - PROGRESS NOTE  *To reach the NS2 Team from 8am to 5pm, please call [phone]   ___________________________________________________________________________  Subjective:    Medications:  aspirin  chewable 81 milliGRAM(s) Oral daily  atorvastatin 80 milliGRAM(s) Oral at bedtime  bisacodyl 5 milliGRAM(s) Oral every 12 hours  budesonide  80 MICROgram(s)/formoterol 4.5 MICROgram(s) Inhaler 2 Puff(s) Inhalation two times a day  carvedilol 25 milliGRAM(s) Oral every 12 hours  chlorhexidine 2% Cloths 1 Application(s) Topical daily  heparin  Infusion 1300 Unit(s)/Hr IV Continuous <Continuous>  hydrALAZINE 50 milliGRAM(s) Oral every 8 hours  hydrOXYzine hydrochloride 25 milliGRAM(s) Oral two times a day PRN  insulin glargine Injectable (LANTUS) 12 Unit(s) SubCutaneous at bedtime  insulin lispro (ADMELOG) corrective regimen sliding scale   SubCutaneous three times a day before meals  insulin lispro (ADMELOG) corrective regimen sliding scale   SubCutaneous at bedtime  insulin lispro Injectable (ADMELOG) 9 Unit(s) SubCutaneous three times a day before meals  isosorbide   dinitrate Tablet (ISORDIL) 30 milliGRAM(s) Oral three times a day  polyethylene glycol 3350 17 Gram(s) Oral two times a day  senna 2 Tablet(s) Oral at bedtime    Physical Exam:    Vitals:  Vital Signs Last 24 Hours  T(C): 36.8 (12-19-23 @ 07:00), Max: 36.8 (12-18-23 @ 19:00)  HR: 73 (12-19-23 @ 07:00) (72 - 86)  BP: --  RR: 112 (12-19-23 @ 08:00) (12 - 112)  SpO2: 96% (12-19-23 @ 08:00) (96% - 99%)    I&O's Summary    18 Dec 2023 07:01  -  19 Dec 2023 07:00  --------------------------------------------------------  IN: 1076 mL / OUT: 1650 mL / NET: -574 mL    19 Dec 2023 07:01  -  19 Dec 2023 08:02  --------------------------------------------------------  IN: 14 mL / OUT: 0 mL / NET: 14 mL    Tele:    General: No distress. Comfortable.  HEENT: EOM intact.  Neck: Neck supple. JVP not elevated. No masses  Chest: Clear to auscultation bilaterally  CV: Normal S1 and S2. No murmurs, rub, or gallops. Radial pulses normal.  Abdomen: Soft, non-distended, non-tender  Skin: No rashes or skin breakdown  Neurology: Alert and oriented times three. Sensation intact  Psych: Affect normal    Labs:                        11.8   8.24  )-----------( 193      ( 19 Dec 2023 01:56 )             35.7     12-19    132<L>  |  104  |  30<H>  ----------------------------<  141<H>  4.1   |  17<L>  |  1.03    Ca    9.7      19 Dec 2023 01:56  Phos  3.6     12-19  Mg     1.8     12-19    TPro  6.7  /  Alb  3.8  /  TBili  0.3  /  DBili  x   /  AST  27  /  ALT  75<H>  /  AlkPhos  60  12-19    PT/INR - ( 19 Dec 2023 01:56 )   PT: 12.1 sec;   INR: 1.16 ratio       PTT - ( 19 Dec 2023 01:56 )  PTT:78.9 sec    Lactate, Blood: 0.9 mmol/L (12-17 @ 01:14)

## 2023-12-19 NOTE — PROGRESS NOTE ADULT - SUBJECTIVE AND OBJECTIVE BOX
DEVORAH VALENCIA  MRN-[id number]  Patient is a 58y old  Male who presents with a chief complaint of NSTEMI, ADHF (19 Dec 2023 07:49)    HPI:  pt seen and approx 1:30 pm  in [facility]    59yo M w/ hx HTN, CAD w/ 1 stent in , ICH () presenting with abn ekg. Patient presented to [facility] where he was found to have STEMI, recommended to get cath however patient did not want to get it there so it left and came here.  Patient initially had cough, congestion, fever, was placed on antibiotics on .  Started feeling nauseous and had a presyncopal event after which he presented to ED last night.  Had chest pain as well.  Chest pain is midsternal.  Not currently having chest pain.  Received 4 aspirin 30 min pta. (2023 15:11)      Hospital Course:    24 HOUR EVENTS:    REVIEW OF SYSTEMS:    CONSTITUTIONAL: No weakness, fevers or chills  EYES/ENT: No visual changes;  No vertigo or throat pain   NECK: No pain or stiffness  RESPIRATORY: No cough, wheezing, hemoptysis; No shortness of breath  CARDIOVASCULAR: No chest pain or palpitations  GASTROINTESTINAL: No abdominal or epigastric pain. No nausea, vomiting, or hematemesis; No diarrhea or constipation. No melena or hematochezia.  GENITOURINARY: No dysuria, frequency or hematuria  NEUROLOGICAL: No numbness or weakness  SKIN: No itching, rashes      ICU Vital Signs Last 24 Hrs  T(C): 36.7 (19 Dec 2023 15:00), Max: 36.8 (18 Dec 2023 23:00)  T(F): 98 (19 Dec 2023 15:00), Max: 98.3 (18 Dec 2023 23:00)  HR: 79 (19 Dec 2023 18:00) (71 - 80)  BP: --  BP(mean): --  ABP: --  ABP(mean): --  RR: 19 (19 Dec 2023 18:00) (12 - 24)  SpO2: 95% (19 Dec 2023 17:00) (94% - 98%)    O2 Parameters below as of 19 Dec 2023 18:00  Patient On (Oxygen Delivery Method): room air            CVP(mm Hg): --  CO: --  CI: --  PA: --  PA(mean): --  PA(direct): --  PCWP: --  LA: --  RA: --  SVR: --  SVRI: --  PVR: --  PVRI: --  I&O's Summary    18 Dec 2023 07:01  -  19 Dec 2023 07:00  --------------------------------------------------------  IN: 1076 mL / OUT: 1650 mL / NET: -574 mL    19 Dec 2023 07:01  -  19 Dec 2023 20:56  --------------------------------------------------------  IN: 634 mL / OUT: 1000 mL / NET: -366 mL        CAPILLARY BLOOD GLUCOSE    CAPILLARY BLOOD GLUCOSE      POCT Blood Glucose.: 125 mg/dL (19 Dec 2023 17:11)      PHYSICAL EXAM:  GENERAL: No acute distress, well-developed  HEAD:  Atraumatic, Normocephalic  EYES: EOMI, PERRLA, conjunctiva and sclera clear  NECK: Supple, no lymphadenopathy, no JVD  CHEST/LUNG: CTAB; No wheezes, rales, or rhonchi  HEART: Regular rate and rhythm. Normal S1/S2. No murmurs, rubs, or gallops  ABDOMEN: Soft, non-tender, non-distended; normal bowel sounds, no organomegaly  EXTREMITIES:  2+ peripheral pulses b/l, No clubbing, cyanosis, or edema  NEUROLOGY: A&O x 3, no focal deficits  SKIN: No rashes or lesions    ============================I/O===========================   I&O's Detail    18 Dec 2023 07:01  -  19 Dec 2023 07:00  --------------------------------------------------------  IN:    Heparin: 336 mL    IV PiggyBack: 50 mL    Oral Fluid: 690 mL  Total IN: 1076 mL    OUT:    Voided (mL): 1650 mL  Total OUT: 1650 mL    Total NET: -574 mL      19 Dec 2023 07:01  -  19 Dec 2023 20:56  --------------------------------------------------------  IN:    Heparin: 154 mL    Oral Fluid: 480 mL  Total IN: 634 mL    OUT:    Voided (mL): 1000 mL  Total OUT: 1000 mL    Total NET: -366 mL        ============================ LABS =========================                        11.8   8.24  )-----------( 193      ( 19 Dec 2023 01:56 )             35.7     -    132<L>  |  104  |  30<H>  ----------------------------<  141<H>  4.1   |  17<L>  |  1.03    Ca    9.7      19 Dec 2023 01:56  Phos  3.6       Mg     1.8         TPro  6.7  /  Alb  3.8  /  TBili  0.3  /  DBili  x   /  AST  27  /  ALT  75<H>  /  AlkPhos  60                  LIVER FUNCTIONS - ( 19 Dec 2023 01:56 )  Alb: 3.8 g/dL / Pro: 6.7 g/dL / ALK PHOS: 60 U/L / ALT: 75 U/L / AST: 27 U/L / GGT: x           PT/INR - ( 19 Dec 2023 01:56 )   PT: 12.1 sec;   INR: 1.16 ratio         PTT - ( 19 Dec 2023 01:56 )  PTT:78.9 sec    Lactate, Blood: 0.9 mmol/L (23 @ 01:14)    Urinalysis Basic - ( 19 Dec 2023 01:56 )    Color: x / Appearance: x / SG: x / pH: x  Gluc: 141 mg/dL / Ketone: x  / Bili: x / Urobili: x   Blood: x / Protein: x / Nitrite: x   Leuk Esterase: x / RBC: x / WBC x   Sq Epi: x / Non Sq Epi: x / Bacteria: x      ======================Micro/Rad/Cardio=================  Telemtry: Reviewed   EKG: Reviewed  CXR: Reviewed  Culture: Reviewed   Echo:   Cath: Cardiac Cath Lab - Adult:   [facility]  Department of Cardiology  [address] [place] [address]  [phone]  Cath Lab Report -- Comprehensive Report  Patient: LD VALENCIA  Study date: 2017  Account number: [id number]  MR number: [id number]  : 1965  Gender: Male  Race: W  Case Physician(s):  Jairon Holguin M.D.  Referring Physician:  Luc Lynn M.D.  INDICATIONS: Unstable angina - CCS4.  HISTORY: The patient has a history of coronary artery disease. The patient  hashypertension and medication-treated dyslipidemia.  PROCEDURE:  --  Left heart catheterization with ventriculography.  --  Left coronary angiography.  --  Right coronary angiography.  TECHNIQUE: The risks and alternatives of the procedures and conscious  sedation were explained to the patient and informed consent was obtained.  Cardiac catheterization performed electively.  Local anesthetic given. Right radial artery access. A 6FR PRELUDE KIT was  inserted in the vessel. Left heart catheterization. Ventriculography was  performed. A 5FR FR4.0 EXPO catheter was utilized. Left coronary artery  angiography. The vessel was injected utilizing a 5FR FL3.5 EXPO catheter.  Right coronary artery angiography. The vessel was injected utilizing a 5FR  FR4.0 EXPO catheter. RADIATION EXPOSURE: 1.1 min.  CONTRAST GIVEN: Omnipaque 55 ml.  MEDICATIONS GIVEN: Midazolam, 1 mg, IV. Fentanyl, 25 mcg, IV. Verapamil  (Isoptin, Calan, Covera), 2.5 mg, IA. Heparin, 3000 units, IA.  VENTRICLES: Global left ventricular function was moderately depressed. EF  estimated was 40 %.  CORONARY VESSELS: The coronary circulation is right dominant.  LM:   --  LM: Normal.  LAD:   --  Proximal LAD: There was a 50 % stenosis.  CX:   --  Circumflex: Normal.  RCA:   --  Mid RCA: There was a 40 % stenosis.  --  Distal RCA: There was a 50 % stenosis.  COMPLICATIONS: There were no complications.  DIAGNOSTIC RECOMMENDATIONS: The patient should continue with the present  medications.  Prepared and signed by  Jairon Holguin M.D.  Signed 2017 12:20:13  HEMODYNAMIC TABLES  Pressures:  Baseline/ Room Air  Pressures:  - HR: 78  Pressures:  - Rhythm:  Pressures:  -- Aortic Pressure (S/D/M): --/--/99  Pressures:  -- Left Ventricle (s/edp): 157/39/--  Outputs:  Baseline/ Room Air  Outputs:  -- CALCULATIONS: Age in years: 51.41  Outputs:  -- CALCULATIONS: Body Surface Area: 2.05  Outputs:  -- CALCULATIONS: Height in cm: 175.00  Outputs:  -- CALCULATIONS: Sex: Male  Outputs:  -- CALCULATIONS: Weight in k.40 (17 @ 21:55)    ======================================================  PAST MEDICAL & SURGICAL HISTORY:  HTN (hypertension)      CAD (coronary artery disease)  ; stent      Intracranial hemorrhage        Respiratory arrest        Myocardial infarction, unspecified MI type, unspecified artery      History of coronary artery stent placement        ====================ASSESSMENT ==============  58 male with HTN, CAD (s/p PCI ), HFrEF, CVA , and T2DM presenting with chest pressure and unknown tachycardia that was shocked x1, [facility]  found to have in-stent restenosis of pLAD and  of RCA with elevated RA and PA pressures and severely decreased. Admitted to CICU for management of cardiogenic shock and ADHF requiring IABP  -, with hospital course c/b vfib arrest requiring reinsertion of IABP. Not recommended for ATs per HF. Currently listed for transplant status 2.    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 AT evaluation launched 11/10, he is ABO A. He was discussed during TSC on  and was approved for listing, however was found to be Bacteremic with  Blc Cx growing mixed cultures Staph epi and Enterococcus faecalis and started on IV Vanco. Repeat cultures from  reveal NGTD and therefore was cleared by ID for listing.     He appears adequately supported on IABP at 1:1, electrically quiescent on oral Amiodarone. Cr is improving with holding diuretics. Labs otherwise notable for worsening thrombocytopenia. Awaiting suitable donor. Now with GM + rods in blood culture on  (reported on ), restarted on vancomycin, and status 7, repeat cultures now negative x48 hours (), now status 2 again.       ====================== NEUROLOGY=====================  Anxiety  - No Seroquel/antipsychotics since vfib arrest and prolonged QTC  - Psych Eval, recommended SSRI, but pt. refused   - Psych recommending atarax PRN  - Continue to monitor mental status    PT/Conditioning  - Continue band exercises while on bedrest s/t IABP    ==================== RESPIRATORY======================  Acute Hypoxemic Respiratory Failure  - s/p x2 intubations for cardiogenic pulm edema and the in setting of cardiac arrest, resolved - extubated 11/10  - Currently maintaining >95% sats on room air  - Continue incentive spirometry and monitoring of sp02    Asthma  - c/w albuterol, symbicort and spiriva  - On trelegy at home  - Continue to monitor SpO2 with goal >94%    ====================CARDIOVASCULAR==================  Vfib arrest i/s/o ischemia  - Lido gtt off   - PO Amio load - total of 5g per EP complete   - Amio dc'd  for rising LFTs  - Keep K > 4, Mag > 2.2     Cardiogenic shock requiring IABP (- , -)  - Likely 2/2 NSTEMI and ADHF  -  LHC: pLAD 100 % in-stent restenosis & mRCA, 100 %. PCWP 30. IABP placed.  -  TTE: LV dilated. EF 32 %. Regional WMAs present, mod (grade 2) LV diastolic dysfunction  -  TTE: EF 22% and + LV thrombus  - IABP swapped  to RFA, continue 1:1 support - switched sensor to R radial a-line  due to poor sensing on groin line  - Off Milrinone gtt @ 7:30 am   - James d/c'd due to elevated K levels  - c/w coreg 25 BID for GDMT  - UNOS status 2 as of , pending DCD   - hydralazine 50 TID and ISD 30 TID for AL reduction    NSTEMI iso stent re-occlusion of pLAD and 100%  of RCA  - EKG on admission w/ LBBB  - DAPT: c/w ASA, Brilinta d/c'd per transplant w/u  - c/w lipitor 80  - cMR deferred given necessity of IABP  - CT sx not recommending CABG, undergoing AT eval    LV thrombus  - c/w heparin gtt w/ therapeutic PTT    ===================== RENAL =========================  Non-oliguric ARIC, resolved   - Baseline Cr: 1-.  - Renal US: no evidence of renal artery stenosis  - Trend BMP, lytes daily, replace as needed  - Continue Strict I/Os, avoid nephrotoxins    =============== GASTROINTESTINAL===================  Constipation/ileus, resolved  - regular diet  - bowel reg prn    ===================ENDO====================  Type 2 DM  - A1c 8.3   - Continue lantus, premeal, low ISS  - continue FS    ===================HEMATOLOGIC/ONC ===================  - H/H & plts stable  - Monitor H/H and plts  - VTE PPX: heparin gtt    ==================INFECTIOUS DISEASE================  Positive blood culture, resolved  - Repeat BCx drawn  for leukocytosis to 12k - positive on , G+ rods in anaerobic bottle, suspect contaminant  - Repeat cultures x2 sent  per transplant ID recs - no growth to date  - Vancomycin by trough (-)  - Final microbial ID: Cutibacterium acnes, per ID this is likely to be a skin contaminant, vanc dc'd.   - Dental eval  to rule out infectious etiology that would have led to bacteremia, no significant findings on exam.     Enterococcus faecalis bacteremia, resolved  - BCx + for enterococcus faecalis x2, Staph epi x1 (likely contaminant)- pan sensitive   - Urine cx  + enterococcus faecalis  - BCx  no growth   - IABP site swapped to RFA   - s/p Vancomycin 1g q12h (-)  - CT A/P negative for infectious pathology    COVID, resolved  - Off airborne precautions     Pre-transplant ID w/u   - Trend ID recs for serologies   - Colonoscopy  - normal   - Chest CT  - improved LLL aeration  - s/p immunizations    ==================DERMATOLOGY================  Upper Extremity Rash  - Patient developed bilateral upper extremity rash after receiving Hepatitis A & B immunizations on   - Dermatology consulted  - not likely to be related to vanco  - Recommend clobetasol 0.05% BID to affected areas   - RVP repeated to rule out viral etiology, negative    ?Left tissue mass  - US soft tissue pending      Lines: IABP (), RRA ()      Patient requires continuous monitoring with bedside rhythm monitoring, pulse ox monitoring, and intermittent blood gas analysis. Care plan discussed with ICU care team. Patient remained critical and at risk for life threatening decompensation.  Patient seen, examined and plan discussed with CCU team during rounds.     I have personally provided ____ minutes of critical care time excluding time spent on separate procedures, in addition to initial critical care time provided by the CICU Attending, Dr. Durand.    By signing my name below, I, Rosalba Tapia, attest that this documentation has been prepared under the direction and in the presence of Kitty Braden NP   Electronically signed: Rosalba Phillips, 23 @ 20:56    I, Kitty Braden, personally performed the services described in this documentation. all medical record entries made by the scribe were at my direction and in my presence. I have reviewed the chart and agree that the record reflects my personal performance and is accurate and complete  Electronically signed: Kitty Braden NP        DEVORAH VALENCIA  MRN-[id number]  Patient is a 58y old  Male who presents with a chief complaint of NSTEMI, ADHF (19 Dec 2023 07:49)    HPI:  pt seen and approx 1:30 pm  in [facility]    59yo M w/ hx HTN, CAD w/ 1 stent in , ICH () presenting with abn ekg. Patient presented to [facility] where he was found to have STEMI, recommended to get cath however patient did not want to get it there so it left and came here.  Patient initially had cough, congestion, fever, was placed on antibiotics on .  Started feeling nauseous and had a presyncopal event after which he presented to ED last night.  Had chest pain as well.  Chest pain is midsternal.  Not currently having chest pain.  Received 4 aspirin 30 min pta. (2023 15:11)      Hospital Course:    24 HOUR EVENTS:    REVIEW OF SYSTEMS:    CONSTITUTIONAL: No weakness, fevers or chills  EYES/ENT: No visual changes;  No vertigo or throat pain   NECK: No pain or stiffness  RESPIRATORY: No cough, wheezing, hemoptysis; No shortness of breath  CARDIOVASCULAR: No chest pain or palpitations  GASTROINTESTINAL: No abdominal or epigastric pain. No nausea, vomiting, or hematemesis; No diarrhea or constipation. No melena or hematochezia.  GENITOURINARY: No dysuria, frequency or hematuria  NEUROLOGICAL: No numbness or weakness  SKIN: No itching, rashes      ICU Vital Signs Last 24 Hrs  T(C): 36.7 (19 Dec 2023 15:00), Max: 36.8 (18 Dec 2023 23:00)  T(F): 98 (19 Dec 2023 15:00), Max: 98.3 (18 Dec 2023 23:00)  HR: 79 (19 Dec 2023 18:00) (71 - 80)  BP: --  BP(mean): --  ABP: --  ABP(mean): --  RR: 19 (19 Dec 2023 18:00) (12 - 24)  SpO2: 95% (19 Dec 2023 17:00) (94% - 98%)    O2 Parameters below as of 19 Dec 2023 18:00  Patient On (Oxygen Delivery Method): room air            CVP(mm Hg): --  CO: --  CI: --  PA: --  PA(mean): --  PA(direct): --  PCWP: --  LA: --  RA: --  SVR: --  SVRI: --  PVR: --  PVRI: --  I&O's Summary    18 Dec 2023 07:01  -  19 Dec 2023 07:00  --------------------------------------------------------  IN: 1076 mL / OUT: 1650 mL / NET: -574 mL    19 Dec 2023 07:01  -  19 Dec 2023 20:56  --------------------------------------------------------  IN: 634 mL / OUT: 1000 mL / NET: -366 mL        CAPILLARY BLOOD GLUCOSE    CAPILLARY BLOOD GLUCOSE      POCT Blood Glucose.: 125 mg/dL (19 Dec 2023 17:11)      PHYSICAL EXAM:  GENERAL: No acute distress, well-developed  HEAD:  Atraumatic, Normocephalic  EYES: EOMI, PERRLA, conjunctiva and sclera clear  NECK: Supple, no lymphadenopathy, no JVD  CHEST/LUNG: CTAB; No wheezes, rales, or rhonchi  HEART: Regular rate and rhythm. Normal S1/S2. No murmurs, rubs, or gallops  ABDOMEN: Soft, non-tender, non-distended; normal bowel sounds, no organomegaly  EXTREMITIES:  2+ peripheral pulses b/l, No clubbing, cyanosis, or edema  NEUROLOGY: A&O x 3, no focal deficits  SKIN: No rashes or lesions    ============================I/O===========================   I&O's Detail    18 Dec 2023 07:01  -  19 Dec 2023 07:00  --------------------------------------------------------  IN:    Heparin: 336 mL    IV PiggyBack: 50 mL    Oral Fluid: 690 mL  Total IN: 1076 mL    OUT:    Voided (mL): 1650 mL  Total OUT: 1650 mL    Total NET: -574 mL      19 Dec 2023 07:01  -  19 Dec 2023 20:56  --------------------------------------------------------  IN:    Heparin: 154 mL    Oral Fluid: 480 mL  Total IN: 634 mL    OUT:    Voided (mL): 1000 mL  Total OUT: 1000 mL    Total NET: -366 mL        ============================ LABS =========================                        11.8   8.24  )-----------( 193      ( 19 Dec 2023 01:56 )             35.7     -    132<L>  |  104  |  30<H>  ----------------------------<  141<H>  4.1   |  17<L>  |  1.03    Ca    9.7      19 Dec 2023 01:56  Phos  3.6       Mg     1.8         TPro  6.7  /  Alb  3.8  /  TBili  0.3  /  DBili  x   /  AST  27  /  ALT  75<H>  /  AlkPhos  60                  LIVER FUNCTIONS - ( 19 Dec 2023 01:56 )  Alb: 3.8 g/dL / Pro: 6.7 g/dL / ALK PHOS: 60 U/L / ALT: 75 U/L / AST: 27 U/L / GGT: x           PT/INR - ( 19 Dec 2023 01:56 )   PT: 12.1 sec;   INR: 1.16 ratio         PTT - ( 19 Dec 2023 01:56 )  PTT:78.9 sec    Lactate, Blood: 0.9 mmol/L (23 @ 01:14)    Urinalysis Basic - ( 19 Dec 2023 01:56 )    Color: x / Appearance: x / SG: x / pH: x  Gluc: 141 mg/dL / Ketone: x  / Bili: x / Urobili: x   Blood: x / Protein: x / Nitrite: x   Leuk Esterase: x / RBC: x / WBC x   Sq Epi: x / Non Sq Epi: x / Bacteria: x      ======================Micro/Rad/Cardio=================  Telemtry: Reviewed   EKG: Reviewed  CXR: Reviewed  Culture: Reviewed   Echo:   Cath: Cardiac Cath Lab - Adult:   [facility]  Department of Cardiology  [address] [place] [address]  [phone]  Cath Lab Report -- Comprehensive Report  Patient: LD VALENCIA  Study date: 2017  Account number: [id number]  MR number: [id number]  : 1965  Gender: Male  Race: W  Case Physician(s):  Jairon Holguin M.D.  Referring Physician:  Luc Lynn M.D.  INDICATIONS: Unstable angina - CCS4.  HISTORY: The patient has a history of coronary artery disease. The patient  hashypertension and medication-treated dyslipidemia.  PROCEDURE:  --  Left heart catheterization with ventriculography.  --  Left coronary angiography.  --  Right coronary angiography.  TECHNIQUE: The risks and alternatives of the procedures and conscious  sedation were explained to the patient and informed consent was obtained.  Cardiac catheterization performed electively.  Local anesthetic given. Right radial artery access. A 6FR PRELUDE KIT was  inserted in the vessel. Left heart catheterization. Ventriculography was  performed. A 5FR FR4.0 EXPO catheter was utilized. Left coronary artery  angiography. The vessel was injected utilizing a 5FR FL3.5 EXPO catheter.  Right coronary artery angiography. The vessel was injected utilizing a 5FR  FR4.0 EXPO catheter. RADIATION EXPOSURE: 1.1 min.  CONTRAST GIVEN: Omnipaque 55 ml.  MEDICATIONS GIVEN: Midazolam, 1 mg, IV. Fentanyl, 25 mcg, IV. Verapamil  (Isoptin, Calan, Covera), 2.5 mg, IA. Heparin, 3000 units, IA.  VENTRICLES: Global left ventricular function was moderately depressed. EF  estimated was 40 %.  CORONARY VESSELS: The coronary circulation is right dominant.  LM:   --  LM: Normal.  LAD:   --  Proximal LAD: There was a 50 % stenosis.  CX:   --  Circumflex: Normal.  RCA:   --  Mid RCA: There was a 40 % stenosis.  --  Distal RCA: There was a 50 % stenosis.  COMPLICATIONS: There were no complications.  DIAGNOSTIC RECOMMENDATIONS: The patient should continue with the present  medications.  Prepared and signed by  Jairon Holguin M.D.  Signed 2017 12:20:13  HEMODYNAMIC TABLES  Pressures:  Baseline/ Room Air  Pressures:  - HR: 78  Pressures:  - Rhythm:  Pressures:  -- Aortic Pressure (S/D/M): --/--/99  Pressures:  -- Left Ventricle (s/edp): 157/39/--  Outputs:  Baseline/ Room Air  Outputs:  -- CALCULATIONS[age]  Outputs:  -- CALCULATIONS: Body Surface Area: 2.05  Outputs:  -- CALCULATIONS: Height in cm: 175.00  Outputs:  -- CALCULATIONS: Sex: Male  Outputs:  -- CALCULATIONS: Weight in k.40 (17 @ 21:55)    ======================================================  PAST MEDICAL & SURGICAL HISTORY:  HTN (hypertension)      CAD (coronary artery disease)  ; stent      Intracranial hemorrhage        Respiratory arrest        Myocardial infarction, unspecified MI type, unspecified artery      History of coronary artery stent placement        ====================ASSESSMENT ==============  58 male with HTN, CAD (s/p PCI ), HFrEF, CVA , and T2DM presenting with chest pressure and unknown tachycardia that was shocked x1, [facility]  found to have in-stent restenosis of pLAD and  of RCA with elevated RA and PA pressures and severely decreased. Admitted to CICU for management of cardiogenic shock and ADHF requiring IABP  -, with hospital course c/b vfib arrest requiring reinsertion of IABP. Not recommended for ATs per HF. Currently listed for transplant status 2.    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 AT evaluation launched 11/10, he is ABO A. He was discussed during TSC on  and was approved for listing, however was found to be Bacteremic with  Blc Cx growing mixed cultures Staph epi and Enterococcus faecalis and started on IV Vanco. Repeat cultures from  reveal NGTD and therefore was cleared by ID for listing.     He appears adequately supported on IABP at 1:1, electrically quiescent on oral Amiodarone. Cr is improving with holding diuretics. Labs otherwise notable for worsening thrombocytopenia. Awaiting suitable donor. Now with GM + rods in blood culture on  (reported on ), restarted on vancomycin, and status 7, repeat cultures now negative x48 hours (), now status 2 again.       ====================== NEUROLOGY=====================  Anxiety  - No Seroquel/antipsychotics since vfib arrest and prolonged QTC  - Psych Eval, recommended SSRI, but pt. refused   - Psych recommending atarax PRN  - Continue to monitor mental status    PT/Conditioning  - Continue band exercises while on bedrest s/t IABP    ==================== RESPIRATORY======================  Acute Hypoxemic Respiratory Failure  - s/p x2 intubations for cardiogenic pulm edema and the in setting of cardiac arrest, resolved - extubated 11/10  - Currently maintaining >95% sats on room air  - Continue incentive spirometry and monitoring of sp02    Asthma  - c/w albuterol, symbicort and spiriva  - On trelegy at home  - Continue to monitor SpO2 with goal >94%    ====================CARDIOVASCULAR==================  Vfib arrest i/s/o ischemia  - Lido gtt off   - PO Amio load - total of 5g per EP complete   - Amio dc'd  for rising LFTs  - Keep K > 4, Mag > 2.2     Cardiogenic shock requiring IABP (- , -)  - Likely 2/2 NSTEMI and ADHF  -  LHC: pLAD 100 % in-stent restenosis & mRCA, 100 %. PCWP 30. IABP placed.  -  TTE: LV dilated. EF 32 %. Regional WMAs present, mod (grade 2) LV diastolic dysfunction  -  TTE: EF 22% and + LV thrombus  - IABP swapped  to RFA, continue 1:1 support - switched sensor to R radial a-line  due to poor sensing on groin line  - Off Milrinone gtt @ 7:30 am   - James d/c'd due to elevated K levels  - c/w coreg 25 BID for GDMT  - UNOS status 2 as of , pending DCD   - hydralazine 50 TID and ISD 30 TID for AL reduction    NSTEMI iso stent re-occlusion of pLAD and 100%  of RCA  - EKG on admission w/ LBBB  - DAPT: c/w ASA, Brilinta d/c'd per transplant w/u  - c/w lipitor 80  - cMR deferred given necessity of IABP  - CT sx not recommending CABG, undergoing AT eval    LV thrombus  - c/w heparin gtt w/ therapeutic PTT    ===================== RENAL =========================  Non-oliguric ARIC, resolved   - Baseline Cr: 1-.  - Renal US: no evidence of renal artery stenosis  - Trend BMP, lytes daily, replace as needed  - Continue Strict I/Os, avoid nephrotoxins    =============== GASTROINTESTINAL===================  Constipation/ileus, resolved  - regular diet  - bowel reg prn    ===================ENDO====================  Type 2 DM  - A1c 8.3   - Continue lantus, premeal, low ISS  - continue FS    ===================HEMATOLOGIC/ONC ===================  - H/H & plts stable  - Monitor H/H and plts  - VTE PPX: heparin gtt    ==================INFECTIOUS DISEASE================  Positive blood culture, resolved  - Repeat BCx drawn  for leukocytosis to 12k - positive on , G+ rods in anaerobic bottle, suspect contaminant  - Repeat cultures x2 sent  per transplant ID recs - no growth to date  - Vancomycin by trough (-)  - Final microbial ID: Cutibacterium acnes, per ID this is likely to be a skin contaminant, vanc dc'd.   - Dental eval  to rule out infectious etiology that would have led to bacteremia, no significant findings on exam.     Enterococcus faecalis bacteremia, resolved  - BCx + for enterococcus faecalis x2, Staph epi x1 (likely contaminant)- pan sensitive   - Urine cx  + enterococcus faecalis  - BCx  no growth   - IABP site swapped to RFA   - s/p Vancomycin 1g q12h (-)  - CT A/P negative for infectious pathology    COVID, resolved  - Off airborne precautions     Pre-transplant ID w/u   - Trend ID recs for serologies   - Colonoscopy  - normal   - Chest CT  - improved LLL aeration  - s/p immunizations    ==================DERMATOLOGY================  Upper Extremity Rash  - Patient developed bilateral upper extremity rash after receiving Hepatitis A & B immunizations on   - Dermatology consulted  - not likely to be related to vanco  - Recommend clobetasol 0.05% BID to affected areas   - RVP repeated to rule out viral etiology, negative    ?Left tissue mass  - US soft tissue pending      Lines: IABP (), RRA ()      Patient requires continuous monitoring with bedside rhythm monitoring, pulse ox monitoring, and intermittent blood gas analysis. Care plan discussed with ICU care team. Patient remained critical and at risk for life threatening decompensation.  Patient seen, examined and plan discussed with CCU team during rounds.     I have personally provided ____ minutes of critical care time excluding time spent on separate procedures, in addition to initial critical care time provided by the CICU Attending, Dr. Durand.    By signing my name below, I, Rosalba Tapia, attest that this documentation has been prepared under the direction and in the presence of Kitty Braden NP   Electronically signed: Rosalba Phillips, 23 @ 20:56    I, Kitty Braden, personally performed the services described in this documentation. all medical record entries made by the scribe were at my direction and in my presence. I have reviewed the chart and agree that the record reflects my personal performance and is accurate and complete  Electronically signed: Kitty Braden NP        DEVORAH VALENCIA  MRN-[id number]  Patient is a 58y old  Male who presents with a chief complaint of NSTEMI, ADHF (19 Dec 2023 07:49)    HPI:  pt seen and approx 1:30 pm  in [facility]    57yo M w/ hx HTN, CAD w/ 1 stent in , ICH () presenting with abn ekg. Patient presented to [facility] where he was found to have STEMI, recommended to get cath however patient did not want to get it there so it left and came here.  Patient initially had cough, congestion, fever, was placed on antibiotics on .  Started feeling nauseous and had a presyncopal event after which he presented to ED last night.  Had chest pain as well.  Chest pain is midsternal.  Not currently having chest pain.  Received 4 aspirin 30 min pta. (2023 15:11)      24 HOUR EVENTS: no acute events.    REVIEW OF SYSTEMS:  CONSTITUTIONAL: No weakness, fevers or chills  EYES/ENT: No visual changes;  No vertigo or throat pain   NECK: No pain or stiffness  RESPIRATORY: No cough, wheezing, hemoptysis; No shortness of breath  CARDIOVASCULAR: No chest pain or palpitations  GASTROINTESTINAL: No abdominal or epigastric pain. No nausea, vomiting, or hematemesis; No diarrhea or constipation. No melena or hematochezia.  GENITOURINARY: No dysuria, frequency or hematuria  NEUROLOGICAL: No numbness or weakness  SKIN: No itching, rashes      ICU Vital Signs Last 24 Hrs  T(C): 36.7 (19 Dec 2023 15:00), Max: 36.8 (18 Dec 2023 23:00)  T(F): 98 (19 Dec 2023 15:00), Max: 98.3 (18 Dec 2023 23:00)  HR: 79 (19 Dec 2023 18:00) (71 - 80)  BP: --  BP(mean): --  ABP: --  ABP(mean): --  RR: 19 (19 Dec 2023 18:00) (12 - 24)  SpO2: 95% (19 Dec 2023 17:00) (94% - 98%)    O2 Parameters below as of 19 Dec 2023 18:00  Patient On (Oxygen Delivery Method): room air    I&O's Summary    18 Dec 2023 07:01  -  19 Dec 2023 07:00  --------------------------------------------------------  IN: 1076 mL / OUT: 1650 mL / NET: -574 mL    19 Dec 2023 07:01  -  19 Dec 2023 20:56  --------------------------------------------------------  IN: 634 mL / OUT: 1000 mL / NET: -366 mL        CAPILLARY BLOOD GLUCOSE    CAPILLARY BLOOD GLUCOSE      POCT Blood Glucose.: 125 mg/dL (19 Dec 2023 17:11)      PHYSICAL EXAM:  GENERAL: No acute distress, well-developed  HEAD:  Atraumatic, Normocephalic  EYES: EOMI, PERRLA, conjunctiva and sclera clear  NECK: Supple, no lymphadenopathy, no JVD  CHEST/LUNG: CTAB; No wheezes, rales, or rhonchi  HEART: Regular rate and rhythm. Normal S1/S2. No murmurs, rubs, or gallops  ABDOMEN: Soft, non-tender, non-distended; normal bowel sounds, no organomegaly  EXTREMITIES:  2+ peripheral pulses b/l, No clubbing, cyanosis, or edema  NEUROLOGY: A&O x 3, no focal deficits  SKIN: No rashes or lesions  Lines: R femoral IABP dsg c/d/i    ============================I/O===========================   I&O's Detail    18 Dec 2023 07:  -  19 Dec 2023 07:00  --------------------------------------------------------  IN:    Heparin: 336 mL    IV PiggyBack: 50 mL    Oral Fluid: 690 mL  Total IN: 1076 mL    OUT:    Voided (mL): 1650 mL  Total OUT: 1650 mL    Total NET: -574 mL      19 Dec 2023 07:01  -  19 Dec 2023 20:56  --------------------------------------------------------  IN:    Heparin: 154 mL    Oral Fluid: 480 mL  Total IN: 634 mL    OUT:    Voided (mL): 1000 mL  Total OUT: 1000 mL    Total NET: -366 mL        ============================ LABS =========================                        11.8   8.24  )-----------( 193      ( 19 Dec 2023 01:56 )             35.7         132<L>  |  104  |  30<H>  ----------------------------<  141<H>  4.1   |  17<L>  |  1.03    Ca    9.7      19 Dec 2023 01:56  Phos  3.6       Mg     1.8         TPro  6.7  /  Alb  3.8  /  TBili  0.3  /  DBili  x   /  AST  27  /  ALT  75<H>  /  AlkPhos  60        LIVER FUNCTIONS - ( 19 Dec 2023 01:56 )  Alb: 3.8 g/dL / Pro: 6.7 g/dL / ALK PHOS: 60 U/L / ALT: 75 U/L / AST: 27 U/L / GGT: x           PT/INR - ( 19 Dec 2023 01:56 )   PT: 12.1 sec;   INR: 1.16 ratio         PTT - ( 19 Dec 2023 01:56 )  PTT:78.9 sec    Lactate, Blood: 0.9 mmol/L (23 @ 01:14)    Urinalysis Basic - ( 19 Dec 2023 01:56 )    Color: x / Appearance: x / SG: x / pH: x  Gluc: 141 mg/dL / Ketone: x  / Bili: x / Urobili: x   Blood: x / Protein: x / Nitrite: x   Leuk Esterase: x / RBC: x / WBC x   Sq Epi: x / Non Sq Epi: x / Bacteria: x      ======================Micro/Rad/Cardio=================  Telemtry: Reviewed   EKG: Reviewed  CXR: Reviewed  Culture: Reviewed   Echo:   Cath: Cardiac Cath Lab - Adult:   [facility]  Department of Cardiology  [address] [place] [address] [phone]  Cath Lab Report -- Comprehensive Report  Patient: LD VALENCIA  Study date: 2017  Account number: [id number]  MR number: [id number]  : 1965  Gender: Male  Race: W  Case Physician(s):  Jairon Holguin M.D.  Referring Physician:  Luc Lynn M.D.  INDICATIONS: Unstable angina - CCS4.  HISTORY: The patient has a history of coronary artery disease. The patient  hashypertension and medication-treated dyslipidemia.  PROCEDURE:  --  Left heart catheterization with ventriculography.  --  Left coronary angiography.  --  Right coronary angiography.  TECHNIQUE: The risks and alternatives of the procedures and conscious  sedation were explained to the patient and informed consent was obtained.  Cardiac catheterization performed electively.  Local anesthetic given. Right radial artery access. A 6FR PRELUDE KIT was  inserted in the vessel. Left heart catheterization. Ventriculography was  performed. A 5FR FR4.0 EXPO catheter was utilized. Left coronary artery  angiography. The vessel was injected utilizing a 5FR FL3.5 EXPO catheter.  Right coronary artery angiography. The vessel was injected utilizing a 5FR  FR4.0 EXPO catheter. RADIATION EXPOSURE: 1.1 min.  CONTRAST GIVEN: Omnipaque 55 ml.  MEDICATIONS GIVEN: Midazolam, 1 mg, IV. Fentanyl, 25 mcg, IV. Verapamil  (Isoptin, Calan, Covera), 2.5 mg, IA. Heparin, 3000 units, IA.  VENTRICLES: Global left ventricular function was moderately depressed. EF  estimated was 40 %.  CORONARY VESSELS: The coronary circulation is right dominant.  LM:   --  LM: Normal.  LAD:   --  Proximal LAD: There was a 50 % stenosis.  CX:   --  Circumflex: Normal.  RCA:   --  Mid RCA: There was a 40 % stenosis.  --  Distal RCA: There was a 50 % stenosis.  COMPLICATIONS: There were no complications.  DIAGNOSTIC RECOMMENDATIONS: The patient should continue with the present  medications.  Prepared and signed by  Jairon Holguin M.D.  Signed 2017 12:20:13  HEMODYNAMIC TABLES  Pressures:  Baseline/ Room Air  Pressures:  - HR: 78  Pressures:  - Rhythm:  Pressures:  -- Aortic Pressure (S/D/M): --/--/99  Pressures:  -- Left Ventricle (s/edp): 157/39/--  Outputs:  Baseline/ Room Air  Outputs:  -- CALCULATIONS[age]  Outputs:  -- CALCULATIONS: Body Surface Area: 2.05  Outputs:  -- CALCULATIONS: Height in cm: 175.00  Outputs:  -- CALCULATIONS: Sex: Male  Outputs:  -- CALCULATIONS: Weight in k.40 (17 @ 21:55)    ======================================================  PAST MEDICAL & SURGICAL HISTORY:  HTN (hypertension)      CAD (coronary artery disease)  ; stent      Intracranial hemorrhage        Respiratory arrest        Myocardial infarction, unspecified MI type, unspecified artery      History of coronary artery stent placement        ====================ASSESSMENT ==============  58 male with HTN, CAD (s/p PCI ), HFrEF, CVA , and T2DM presenting with chest pressure and unknown tachycardia that was shocked x1, [facility]  found to have in-stent restenosis of pLAD and  of RCA with elevated RA and PA pressures and severely decreased. Admitted to CICU for management of cardiogenic shock and ADHF requiring IABP  -, with hospital course c/b vfib arrest requiring reinsertion of IABP, currently listed for transplant status 2.    ====================== NEUROLOGY=====================  Anxiety  - No Seroquel/antipsychotics since vfib arrest and prolonged QTC  - Psych Eval, recommended SSRI, but pt. refused   - Psych recommending atarax PRN  - Continue to monitor mental status    PT/Conditioning  - Continue band exercises while on bedrest s/t IABP    ==================== RESPIRATORY======================  Acute Hypoxemic Respiratory Failure  - s/p x2 intubations for cardiogenic pulm edema and the in setting of cardiac arrest, resolved - extubated 11/10  - Currently maintaining >95% sats on room air  - Continue incentive spirometry and monitoring of sp02    Asthma  - c/w albuterol, symbicort and spiriva  - On trelegy at home  - Continue to monitor SpO2 with goal >94%    ====================CARDIOVASCULAR==================  Vfib arrest i/s/o ischemia  - Lido gtt off   - PO Amio load - total of 5g per EP complete   - Amio dc'd  for rising LFTs  - Keep K > 4, Mag > 2.2     Cardiogenic shock requiring IABP (- , -)  - Likely 2/2 NSTEMI and ADHF  -  LHC: pLAD 100 % in-stent restenosis & mRCA, 100 %. PCWP 30. IABP placed.  -  TTE: LV dilated. EF 32 %. Regional WMAs present, mod (grade 2) LV diastolic dysfunction  -  TTE: EF 22% and + LV thrombus  - IABP swapped  to RFA, continue 1:1 support - switched sensor to R radial a-line  due to poor sensing on groin line  - Off Milrinone gtt @ 7:30 am   - James d/c'd due to elevated K levels  - c/w coreg 25 BID for GDMT  - UNOS status 2 as of , pending DCD   - hydralazine 50 TID and ISD 30 TID for AL reduction    NSTEMI iso stent re-occlusion of pLAD and 100%  of RCA  - EKG on admission w/ LBBB  - DAPT: c/w ASA, Brilinta d/c'd per transplant w/u  - c/w lipitor 80  - cMR deferred given necessity of IABP  - CT sx not recommending CABG, undergoing AT eval    LV thrombus  - c/w heparin gtt w/ therapeutic PTT    ===================== RENAL =========================  Non-oliguric ARIC, resolved   - Baseline Cr: -  - Renal US: no evidence of renal artery stenosis  - Trend BMP, lytes daily, replace as needed  - Continue Strict I/Os, avoid nephrotoxins    =============== GASTROINTESTINAL===================  Constipation/ileus, resolved  - regular diet  - bowel reg prn    ===================ENDO====================  Type 2 DM  - A1c 8.3, sugars controlled  - Continue lantus, premeal, low ISS  - continue FS    ===================HEMATOLOGIC/ONC ===================  - H/H & plts stable  - Monitor H/H and plts  - VTE PPX: heparin gtt    ==================INFECTIOUS DISEASE================  Positive blood culture, resolved  - Repeat BCx drawn  for leukocytosis to 12k - positive on , G+ rods in anaerobic bottle, suspect contaminant  - Repeat cultures x2 sent  per transplant ID recs - no growth to date  - Vancomycin by trough (-)  - Final microbial ID: Cutibacterium acnes, per ID this is likely to be a skin contaminant, vanc dc'd.   - Dental eval  to rule out infectious etiology that would have led to bacteremia, no significant findings on exam.     Enterococcus faecalis bacteremia, resolved  - BCx + for enterococcus faecalis x2, Staph epi x1 (likely contaminant)- pan sensitive   - Urine cx  + enterococcus faecalis  - BCx  no growth   - IABP site swapped to RFA   - s/p Vancomycin 1g q12h (-)  - CT A/P negative for infectious pathology    COVID, resolved  - Off airborne precautions     Pre-transplant ID w/u   - Trend ID recs for serologies   - Colonoscopy  - normal   - Chest CT  - improved LLL aeration  - s/p immunizations    ==================DERMATOLOGY================  Upper Extremity Rash  - Patient developed bilateral upper extremity rash after receiving Hepatitis A & B immunizations on   - Dermatology consulted  - not likely to be related to vanco  - Recommend clobetasol 0.05% BID to affected areas   - RVP repeated to rule out viral etiology, negative    ?Left tissue mass  - US soft tissue - palpable abnormality within the lateral left neck - heterogeneous complex solid and cystic appearing lesion in subcutaneous neck extending into strap muscle  - f/u vascular cardiology consult in am    Lines: IABP (), RRA ()    ======================= DISPOSITION  =====================  [X] Critical   [ ] Guarded    [ ] Stable    [X] Maintain in CICU  [ ] Downgrade to Telemtry  [ ] Discharge Home    Patient requires continuous monitoring with bedside rhythm monitoring, pulse ox monitoring, and intermittent blood gas analysis. Care plan discussed with ICU care team. Patient remained critical and at risk for life threatening decompensation.  Patient seen, examined and plan discussed with CCU team during rounds.     I have personally provided ____ minutes of critical care time excluding time spent on separate procedures, in addition to initial critical care time provided by the CICU Attending, Dr. Lees/ Ladarius/ Marisa/ Saba/ Juan/ Consuelo .     Patient requires continuous monitoring with bedside rhythm monitoring, pulse ox monitoring, and intermittent blood gas analysis. Care plan discussed with ICU care team. Patient remained critical and at risk for life threatening decompensation.  Patient seen, examined and plan discussed with CCU team during rounds.     I have personally provided 30 minutes of critical care time excluding time spent on separate procedures, in addition to initial critical care time provided by the CICU Attending, Dr. Durand.    By signing my name below, I, Rosalba Tapia, attest that this documentation has been prepared under the direction and in the presence of Kitty Braden NP   Electronically signed: Rosalba Phillips, 23 @ 20:56    I, Kitty Braden, personally performed the services described in this documentation. all medical record entries made by the scribe were at my direction and in my presence. I have reviewed the chart and agree that the record reflects my personal performance and is accurate and complete  Electronically signed: Kitty Braden NP        DEVORAH VALENCIA  MRN-[id number]  Patient is a 58y old  Male who presents with a chief complaint of NSTEMI, ADHF (19 Dec 2023 07:49)    HPI:  pt seen and approx 1:30 pm  in [facility]    59yo M w/ hx HTN, CAD w/ 1 stent in , ICH () presenting with abn ekg. Patient presented to [facility] where he was found to have STEMI, recommended to get cath however patient did not want to get it there so it left and came here.  Patient initially had cough, congestion, fever, was placed on antibiotics on .  Started feeling nauseous and had a presyncopal event after which he presented to ED last night.  Had chest pain as well.  Chest pain is midsternal.  Not currently having chest pain.  Received 4 aspirin 30 min pta. (2023 15:11)      24 HOUR EVENTS: no acute events.    REVIEW OF SYSTEMS:  CONSTITUTIONAL: No weakness, fevers or chills  EYES/ENT: No visual changes;  No vertigo or throat pain   NECK: No pain or stiffness  RESPIRATORY: No cough, wheezing, hemoptysis; No shortness of breath  CARDIOVASCULAR: No chest pain or palpitations  GASTROINTESTINAL: No abdominal or epigastric pain. No nausea, vomiting, or hematemesis; No diarrhea or constipation. No melena or hematochezia.  GENITOURINARY: No dysuria, frequency or hematuria  NEUROLOGICAL: No numbness or weakness  SKIN: No itching, rashes      ICU Vital Signs Last 24 Hrs  T(C): 36.7 (19 Dec 2023 15:00), Max: 36.8 (18 Dec 2023 23:00)  T(F): 98 (19 Dec 2023 15:00), Max: 98.3 (18 Dec 2023 23:00)  HR: 79 (19 Dec 2023 18:00) (71 - 80)  BP: --  BP(mean): --  ABP: --  ABP(mean): --  RR: 19 (19 Dec 2023 18:00) (12 - 24)  SpO2: 95% (19 Dec 2023 17:00) (94% - 98%)    O2 Parameters below as of 19 Dec 2023 18:00  Patient On (Oxygen Delivery Method): room air    I&O's Summary    18 Dec 2023 07:01  -  19 Dec 2023 07:00  --------------------------------------------------------  IN: 1076 mL / OUT: 1650 mL / NET: -574 mL    19 Dec 2023 07:01  -  19 Dec 2023 20:56  --------------------------------------------------------  IN: 634 mL / OUT: 1000 mL / NET: -366 mL        CAPILLARY BLOOD GLUCOSE    CAPILLARY BLOOD GLUCOSE      POCT Blood Glucose.: 125 mg/dL (19 Dec 2023 17:11)      PHYSICAL EXAM:  GENERAL: No acute distress, well-developed  HEAD:  Atraumatic, Normocephalic  EYES: EOMI, PERRLA, conjunctiva and sclera clear  NECK: Supple, no lymphadenopathy, no JVD  CHEST/LUNG: CTAB; No wheezes, rales, or rhonchi  HEART: Regular rate and rhythm. Normal S1/S2. No murmurs, rubs, or gallops  ABDOMEN: Soft, non-tender, non-distended; normal bowel sounds, no organomegaly  EXTREMITIES:  2+ peripheral pulses b/l, No clubbing, cyanosis, or edema  NEUROLOGY: A&O x 3, no focal deficits  SKIN: No rashes or lesions  Lines: R femoral IABP dsg c/d/i    ============================I/O===========================   I&O's Detail    18 Dec 2023 07:  -  19 Dec 2023 07:00  --------------------------------------------------------  IN:    Heparin: 336 mL    IV PiggyBack: 50 mL    Oral Fluid: 690 mL  Total IN: 1076 mL    OUT:    Voided (mL): 1650 mL  Total OUT: 1650 mL    Total NET: -574 mL      19 Dec 2023 07:01  -  19 Dec 2023 20:56  --------------------------------------------------------  IN:    Heparin: 154 mL    Oral Fluid: 480 mL  Total IN: 634 mL    OUT:    Voided (mL): 1000 mL  Total OUT: 1000 mL    Total NET: -366 mL        ============================ LABS =========================                        11.8   8.24  )-----------( 193      ( 19 Dec 2023 01:56 )             35.7         132<L>  |  104  |  30<H>  ----------------------------<  141<H>  4.1   |  17<L>  |  1.03    Ca    9.7      19 Dec 2023 01:56  Phos  3.6       Mg     1.8         TPro  6.7  /  Alb  3.8  /  TBili  0.3  /  DBili  x   /  AST  27  /  ALT  75<H>  /  AlkPhos  60        LIVER FUNCTIONS - ( 19 Dec 2023 01:56 )  Alb: 3.8 g/dL / Pro: 6.7 g/dL / ALK PHOS: 60 U/L / ALT: 75 U/L / AST: 27 U/L / GGT: x           PT/INR - ( 19 Dec 2023 01:56 )   PT: 12.1 sec;   INR: 1.16 ratio         PTT - ( 19 Dec 2023 01:56 )  PTT:78.9 sec    Lactate, Blood: 0.9 mmol/L (23 @ 01:14)    Urinalysis Basic - ( 19 Dec 2023 01:56 )    Color: x / Appearance: x / SG: x / pH: x  Gluc: 141 mg/dL / Ketone: x  / Bili: x / Urobili: x   Blood: x / Protein: x / Nitrite: x   Leuk Esterase: x / RBC: x / WBC x   Sq Epi: x / Non Sq Epi: x / Bacteria: x      ======================Micro/Rad/Cardio=================  Telemtry: Reviewed   EKG: Reviewed  CXR: Reviewed  Culture: Reviewed   Echo:   Cath: Cardiac Cath Lab - Adult:   [facility]  Department of Cardiology  [address] [place] [address] [phone]  Cath Lab Report -- Comprehensive Report  Patient: LD VALENCIA  Study date: 2017  Account number: [id number]  MR number: [id number]  : 1965  Gender: Male  Race: W  Case Physician(s):  Jairon Holguin M.D.  Referring Physician:  Luc Lynn M.D.  INDICATIONS: Unstable angina - CCS4.  HISTORY: The patient has a history of coronary artery disease. The patient  hashypertension and medication-treated dyslipidemia.  PROCEDURE:  --  Left heart catheterization with ventriculography.  --  Left coronary angiography.  --  Right coronary angiography.  TECHNIQUE: The risks and alternatives of the procedures and conscious  sedation were explained to the patient and informed consent was obtained.  Cardiac catheterization performed electively.  Local anesthetic given. Right radial artery access. A 6FR PRELUDE KIT was  inserted in the vessel. Left heart catheterization. Ventriculography was  performed. A 5FR FR4.0 EXPO catheter was utilized. Left coronary artery  angiography. The vessel was injected utilizing a 5FR FL3.5 EXPO catheter.  Right coronary artery angiography. The vessel was injected utilizing a 5FR  FR4.0 EXPO catheter. RADIATION EXPOSURE: 1.1 min.  CONTRAST GIVEN: Omnipaque 55 ml.  MEDICATIONS GIVEN: Midazolam, 1 mg, IV. Fentanyl, 25 mcg, IV. Verapamil  (Isoptin, Calan, Covera), 2.5 mg, IA. Heparin, 3000 units, IA.  VENTRICLES: Global left ventricular function was moderately depressed. EF  estimated was 40 %.  CORONARY VESSELS: The coronary circulation is right dominant.  LM:   --  LM: Normal.  LAD:   --  Proximal LAD: There was a 50 % stenosis.  CX:   --  Circumflex: Normal.  RCA:   --  Mid RCA: There was a 40 % stenosis.  --  Distal RCA: There was a 50 % stenosis.  COMPLICATIONS: There were no complications.  DIAGNOSTIC RECOMMENDATIONS: The patient should continue with the present  medications.  Prepared and signed by  Jairon Holguin M.D.  Signed 2017 12:20:13  HEMODYNAMIC TABLES  Pressures:  Baseline/ Room Air  Pressures:  - HR: 78  Pressures:  - Rhythm:  Pressures:  -- Aortic Pressure (S/D/M): --/--/99  Pressures:  -- Left Ventricle (s/edp): 157/39/--  Outputs:  Baseline/ Room Air  Outputs:  -- CALCULATIONS[age]  Outputs:  -- CALCULATIONS: Body Surface Area: 2.05  Outputs:  -- CALCULATIONS: Height in cm: 175.00  Outputs:  -- CALCULATIONS: Sex: Male  Outputs:  -- CALCULATIONS: Weight in k.40 (17 @ 21:55)    ======================================================  PAST MEDICAL & SURGICAL HISTORY:  HTN (hypertension)      CAD (coronary artery disease)  ; stent      Intracranial hemorrhage        Respiratory arrest        Myocardial infarction, unspecified MI type, unspecified artery      History of coronary artery stent placement        ====================ASSESSMENT ==============  58 male with HTN, CAD (s/p PCI ), HFrEF, CVA , and T2DM presenting with chest pressure and unknown tachycardia that was shocked x1, [facility]  found to have in-stent restenosis of pLAD and  of RCA with elevated RA and PA pressures and severely decreased. Admitted to CICU for management of cardiogenic shock and ADHF requiring IABP  -, with hospital course c/b vfib arrest requiring reinsertion of IABP, currently listed for transplant status 2.    ====================== NEUROLOGY=====================  Anxiety  - No Seroquel/antipsychotics since vfib arrest and prolonged QTC  - Psych Eval, recommended SSRI, but pt. refused   - Psych recommending atarax PRN  - Continue to monitor mental status    PT/Conditioning  - Continue band exercises while on bedrest s/t IABP    ==================== RESPIRATORY======================  Acute Hypoxemic Respiratory Failure  - s/p x2 intubations for cardiogenic pulm edema and the in setting of cardiac arrest, resolved - extubated 11/10  - Currently maintaining >95% sats on room air  - Continue incentive spirometry and monitoring of sp02    Asthma  - c/w albuterol, symbicort and spiriva  - On trelegy at home  - Continue to monitor SpO2 with goal >94%    ====================CARDIOVASCULAR==================  Vfib arrest i/s/o ischemia  - Lido gtt off   - PO Amio load - total of 5g per EP complete   - Amio dc'd  for rising LFTs  - Keep K > 4, Mag > 2.2     Cardiogenic shock requiring IABP (- , -)  - Likely 2/2 NSTEMI and ADHF  -  LHC: pLAD 100 % in-stent restenosis & mRCA, 100 %. PCWP 30. IABP placed.  -  TTE: LV dilated. EF 32 %. Regional WMAs present, mod (grade 2) LV diastolic dysfunction  -  TTE: EF 22% and + LV thrombus  - IABP swapped  to RFA, continue 1:1 support - switched sensor to R radial a-line  due to poor sensing on groin line  - Off Milrinone gtt @ 7:30 am   - James d/c'd due to elevated K levels  - c/w coreg 25 BID for GDMT  - UNOS status 2 as of , pending DCD   - hydralazine 50 TID and ISD 30 TID for AL reduction    NSTEMI iso stent re-occlusion of pLAD and 100%  of RCA  - EKG on admission w/ LBBB  - DAPT: c/w ASA, Brilinta d/c'd per transplant w/u  - c/w lipitor 80  - cMR deferred given necessity of IABP  - CT sx not recommending CABG, undergoing AT eval    LV thrombus  - c/w heparin gtt w/ therapeutic PTT    ===================== RENAL =========================  Non-oliguric ARIC, resolved   - Baseline Cr: -  - Renal US: no evidence of renal artery stenosis  - Trend BMP, lytes daily, replace as needed  - Continue Strict I/Os, avoid nephrotoxins    =============== GASTROINTESTINAL===================  Constipation/ileus, resolved  - regular diet  - bowel reg prn    ===================ENDO====================  Type 2 DM  - A1c 8.3, sugars controlled  - Continue lantus, premeal, low ISS  - continue FS    ===================HEMATOLOGIC/ONC ===================  - H/H & plts stable  - Monitor H/H and plts  - VTE PPX: heparin gtt    ==================INFECTIOUS DISEASE================  Positive blood culture, resolved  - Repeat BCx drawn  for leukocytosis to 12k - positive on , G+ rods in anaerobic bottle, suspect contaminant  - Repeat cultures x2 sent  per transplant ID recs - no growth to date  - Vancomycin by trough (-)  - Final microbial ID: Cutibacterium acnes, per ID this is likely to be a skin contaminant, vanc dc'd.   - Dental eval  to rule out infectious etiology that would have led to bacteremia, no significant findings on exam.     Enterococcus faecalis bacteremia, resolved  - BCx + for enterococcus faecalis x2, Staph epi x1 (likely contaminant)- pan sensitive   - Urine cx  + enterococcus faecalis  - BCx  no growth   - IABP site swapped to RFA   - s/p Vancomycin 1g q12h (-)  - CT A/P negative for infectious pathology    COVID, resolved  - Off airborne precautions     Pre-transplant ID w/u   - Trend ID recs for serologies   - Colonoscopy  - normal   - Chest CT  - improved LLL aeration  - s/p immunizations    ==================DERMATOLOGY================  Upper Extremity Rash  - Patient developed bilateral upper extremity rash after receiving Hepatitis A & B immunizations on   - Dermatology consulted  - not likely to be related to vanco  - Recommend clobetasol 0.05% BID to affected areas   - RVP repeated to rule out viral etiology, negative    ?Left tissue mass  - US soft tissue - palpable abnormality within the lateral left neck - heterogeneous complex solid and cystic appearing lesion in subcutaneous neck extending into strap muscle  - f/u vascular cardiology consult in am    Lines: IABP (), RRA ()    ======================= DISPOSITION  =====================  [X] Critical   [ ] Guarded    [ ] Stable    [X] Maintain in CICU  [ ] Downgrade to Telemtry  [ ] Discharge Home    Patient requires continuous monitoring with bedside rhythm monitoring, pulse ox monitoring, and intermittent blood gas analysis. Care plan discussed with ICU care team. Patient remained critical and at risk for life threatening decompensation.  Patient seen, examined and plan discussed with CCU team during rounds.     I have personally provided ____ minutes of critical care time excluding time spent on separate procedures, in addition to initial critical care time provided by the CICU Attending, Dr. Lees/ Ladarius/ Marisa/ Saba/ Juan/ Consuelo .     Patient requires continuous monitoring with bedside rhythm monitoring, pulse ox monitoring, and intermittent blood gas analysis. Care plan discussed with ICU care team. Patient remained critical and at risk for life threatening decompensation.  Patient seen, examined and plan discussed with CCU team during rounds.     I have personally provided 30 minutes of critical care time excluding time spent on separate procedures, in addition to initial critical care time provided by the CICU Attending, Dr. Durand.    By signing my name below, I, Rosalba Tapia, attest that this documentation has been prepared under the direction and in the presence of Kitty Braden NP   Electronically signed: Rosalba Phillips, 23 @ 20:56    I, Kitty Braden, personally performed the services described in this documentation. all medical record entries made by the scribe were at my direction and in my presence. I have reviewed the chart and agree that the record reflects my personal performance and is accurate and complete  Electronically signed: Kitty Braden NP        DEVORAH VALENCIA  MRN-[id number]  Patient is a 58y old  Male who presents with a chief complaint of NSTEMI, ADHF (19 Dec 2023 07:49)    HPI:  pt seen and approx 1:30 pm  in [facility]    59yo M w/ hx HTN, CAD w/ 1 stent in , ICH () presenting with abn ekg. Patient presented to [facility] where he was found to have STEMI, recommended to get cath however patient did not want to get it there so it left and came here.  Patient initially had cough, congestion, fever, was placed on antibiotics on .  Started feeling nauseous and had a presyncopal event after which he presented to ED last night.  Had chest pain as well.  Chest pain is midsternal.  Not currently having chest pain.  Received 4 aspirin 30 min pta. (2023 15:11)    24 HOUR EVENTS: no acute events.    REVIEW OF SYSTEMS:  CONSTITUTIONAL: No weakness, fevers or chills  EYES/ENT: No visual changes;  No vertigo or throat pain   NECK: No pain or stiffness  RESPIRATORY: No cough, wheezing, hemoptysis; No shortness of breath  CARDIOVASCULAR: No chest pain or palpitations  GASTROINTESTINAL: No abdominal or epigastric pain. No nausea, vomiting, or hematemesis; No diarrhea or constipation. No melena or hematochezia.  GENITOURINARY: No dysuria, frequency or hematuria  NEUROLOGICAL: No numbness or weakness  SKIN: No itching, rashes      ICU Vital Signs Last 24 Hrs  T(C): 36.7 (19 Dec 2023 15:00), Max: 36.8 (18 Dec 2023 23:00)  T(F): 98 (19 Dec 2023 15:00), Max: 98.3 (18 Dec 2023 23:00)  HR: 79 (19 Dec 2023 18:00) (71 - 80)  BP: --  BP(mean): --  ABP: --  ABP(mean): --  RR: 19 (19 Dec 2023 18:00) (12 - 24)  SpO2: 95% (19 Dec 2023 17:00) (94% - 98%)    O2 Parameters below as of 19 Dec 2023 18:00  Patient On (Oxygen Delivery Method): room air    I&O's Summary    18 Dec 2023 07:01  -  19 Dec 2023 07:00  --------------------------------------------------------  IN: 1076 mL / OUT: 1650 mL / NET: -574 mL    19 Dec 2023 07:01  -  19 Dec 2023 20:56  --------------------------------------------------------  IN: 634 mL / OUT: 1000 mL / NET: -366 mL        CAPILLARY BLOOD GLUCOSE    CAPILLARY BLOOD GLUCOSE      POCT Blood Glucose.: 125 mg/dL (19 Dec 2023 17:11)      PHYSICAL EXAM:  GENERAL: No acute distress, well-developed  HEAD:  Atraumatic, Normocephalic  EYES: EOMI, PERRLA, conjunctiva and sclera clear  NECK: Supple, no lymphadenopathy, no JVD  CHEST/LUNG: CTAB; No wheezes, rales, or rhonchi  HEART: Regular rate and rhythm. Normal S1/S2. No murmurs, rubs, or gallops  ABDOMEN: Soft, non-tender, non-distended; normal bowel sounds, no organomegaly  EXTREMITIES:  2+ peripheral pulses b/l, No clubbing, cyanosis, or edema  NEUROLOGY: A&O x 3, no focal deficits  SKIN: No rashes or lesions  Lines: R femoral IABP dsg c/d/i    ============================I/O===========================   I&O's Detail    18 Dec 2023 07:  -  19 Dec 2023 07:00  --------------------------------------------------------  IN:    Heparin: 336 mL    IV PiggyBack: 50 mL    Oral Fluid: 690 mL  Total IN: 1076 mL    OUT:    Voided (mL): 1650 mL  Total OUT: 1650 mL    Total NET: -574 mL      19 Dec 2023 07:01  -  19 Dec 2023 20:56  --------------------------------------------------------  IN:    Heparin: 154 mL    Oral Fluid: 480 mL  Total IN: 634 mL    OUT:    Voided (mL): 1000 mL  Total OUT: 1000 mL    Total NET: -366 mL        ============================ LABS =========================                        11.8   8.24  )-----------( 193      ( 19 Dec 2023 01:56 )             35.7         132<L>  |  104  |  30<H>  ----------------------------<  141<H>  4.1   |  17<L>  |  1.03    Ca    9.7      19 Dec 2023 01:56  Phos  3.6       Mg     1.8         TPro  6.7  /  Alb  3.8  /  TBili  0.3  /  DBili  x   /  AST  27  /  ALT  75<H>  /  AlkPhos  60        LIVER FUNCTIONS - ( 19 Dec 2023 01:56 )  Alb: 3.8 g/dL / Pro: 6.7 g/dL / ALK PHOS: 60 U/L / ALT: 75 U/L / AST: 27 U/L / GGT: x           PT/INR - ( 19 Dec 2023 01:56 )   PT: 12.1 sec;   INR: 1.16 ratio         PTT - ( 19 Dec 2023 01:56 )  PTT:78.9 sec    Lactate, Blood: 0.9 mmol/L (23 @ 01:14)    Urinalysis Basic - ( 19 Dec 2023 01:56 )    Color: x / Appearance: x / SG: x / pH: x  Gluc: 141 mg/dL / Ketone: x  / Bili: x / Urobili: x   Blood: x / Protein: x / Nitrite: x   Leuk Esterase: x / RBC: x / WBC x   Sq Epi: x / Non Sq Epi: x / Bacteria: x      ======================Micro/Rad/Cardio=================  Telemtry: Reviewed   EKG: Reviewed  CXR: Reviewed  Culture: Reviewed   Echo:   Cath: Cardiac Cath Lab - Adult:   [facility]  Department of Cardiology  [address] [place] [address] [phone]  Cath Lab Report -- Comprehensive Report  Patient: LD VALENCIA  Study date: 2017  Account number: [id number]  MR number: [id number]  : 1965  Gender: Male  Race: W  Case Physician(s):  Jairon Holguin M.D.  Referring Physician:  Luc Lynn M.D.  INDICATIONS: Unstable angina - CCS4.  HISTORY: The patient has a history of coronary artery disease. The patient  hashypertension and medication-treated dyslipidemia.  PROCEDURE:  --  Left heart catheterization with ventriculography.  --  Left coronary angiography.  --  Right coronary angiography.  TECHNIQUE: The risks and alternatives of the procedures and conscious  sedation were explained to the patient and informed consent was obtained.  Cardiac catheterization performed electively.  Local anesthetic given. Right radial artery access. A 6FR PRELUDE KIT was  inserted in the vessel. Left heart catheterization. Ventriculography was  performed. A 5FR FR4.0 EXPO catheter was utilized. Left coronary artery  angiography. The vessel was injected utilizing a 5FR FL3.5 EXPO catheter.  Right coronary artery angiography. The vessel was injected utilizing a 5FR  FR4.0 EXPO catheter. RADIATION EXPOSURE: 1.1 min.  CONTRAST GIVEN: Omnipaque 55 ml.  MEDICATIONS GIVEN: Midazolam, 1 mg, IV. Fentanyl, 25 mcg, IV. Verapamil  (Isoptin, Calan, Covera), 2.5 mg, IA. Heparin, 3000 units, IA.  VENTRICLES: Global left ventricular function was moderately depressed. EF  estimated was 40 %.  CORONARY VESSELS: The coronary circulation is right dominant.  LM:   --  LM: Normal.  LAD:   --  Proximal LAD: There was a 50 % stenosis.  CX:   --  Circumflex: Normal.  RCA:   --  Mid RCA: There was a 40 % stenosis.  --  Distal RCA: There was a 50 % stenosis.  COMPLICATIONS: There were no complications.  DIAGNOSTIC RECOMMENDATIONS: The patient should continue with the present  medications.  Prepared and signed by  Jairon Holguin M.D.  Signed 2017 12:20:13  HEMODYNAMIC TABLES  Pressures:  Baseline/ Room Air  Pressures:  - HR: 78  Pressures:  - Rhythm:  Pressures:  -- Aortic Pressure (S/D/M): --/--/99  Pressures:  -- Left Ventricle (s/edp): 157/39/--  Outputs:  Baseline/ Room Air  Outputs:  -- CALCULATIONS[age]  Outputs:  -- CALCULATIONS: Body Surface Area: 2.05  Outputs:  -- CALCULATIONS: Height in cm: 175.00  Outputs:  -- CALCULATIONS: Sex: Male  Outputs:  -- CALCULATIONS: Weight in k.40 (17 @ 21:55)    ======================================================  PAST MEDICAL & SURGICAL HISTORY:  HTN (hypertension)      CAD (coronary artery disease)  ; stent      Intracranial hemorrhage        Respiratory arrest        Myocardial infarction, unspecified MI type, unspecified artery      History of coronary artery stent placement        ====================ASSESSMENT ==============  58 male with HTN, CAD (s/p PCI ), HFrEF, CVA , and T2DM presenting with chest pressure and unknown tachycardia that was shocked x1, [facility]  found to have in-stent restenosis of pLAD and  of RCA with elevated RA and PA pressures and severely decreased. Admitted to CICU for management of cardiogenic shock and ADHF requiring IABP  -, with hospital course c/b vfib arrest requiring reinsertion of IABP, currently listed for transplant status 2.    ====================== NEUROLOGY=====================  Anxiety  - No Seroquel/antipsychotics since vfib arrest and prolonged QTC  - Psych Eval, recommended SSRI, but pt. refused   - Psych recommending atarax PRN  - Continue to monitor mental status    PT/Conditioning  - Continue band exercises while on bedrest s/t IABP    ==================== RESPIRATORY======================  Acute Hypoxemic Respiratory Failure  - s/p x2 intubations for cardiogenic pulm edema and the in setting of cardiac arrest, resolved - extubated 11/10  - Currently maintaining >95% sats on room air  - Continue incentive spirometry and monitoring of sp02    Asthma  - c/w albuterol, symbicort and spiriva  - On trelegy at home  - Continue to monitor SpO2 with goal >94%    ====================CARDIOVASCULAR==================  Vfib arrest i/s/o ischemia  - Lido gtt off   - PO Amio load - total of 5g per EP complete   - Amio dc'd  for rising LFTs  - Keep K > 4, Mag > 2.2     Cardiogenic shock requiring IABP (- , -)  - Likely 2/2 NSTEMI and ADHF  -  LHC: pLAD 100 % in-stent restenosis & mRCA, 100 %. PCWP 30. IABP placed.  -  TTE: LV dilated. EF 32 %. Regional WMAs present, mod (grade 2) LV diastolic dysfunction  -  TTE: EF 22% and + LV thrombus  - IABP swapped  to RFA, continue 1:1 support - switched sensor to R radial a-line  due to poor sensing on groin line  - Off Milrinone gtt @ 7:30 am   - Bronson d/c'd due to elevated K levels  - c/w coreg 25 BID for GDMT  - UNOS status 2 as of , pending DCD   - hydralazine 50 TID and ISD 30 TID for AL reduction    NSTEMI iso stent re-occlusion of pLAD and 100%  of RCA  - EKG on admission w/ LBBB  - DAPT: c/w ASA, Brilinta d/c'd per transplant w/u  - c/w lipitor 80  - cMR deferred given necessity of IABP  - CT sx not recommending CABG, undergoing AT eval    LV thrombus  - c/w heparin gtt w/ therapeutic PTT    ===================== RENAL =========================  Non-oliguric ARIC, resolved   - Baseline Cr: -  - Renal US: no evidence of renal artery stenosis  - Trend BMP, lytes daily, replace as needed  - Continue Strict I/Os, avoid nephrotoxins    =============== GASTROINTESTINAL===================  Constipation/ileus, resolved  - regular diet  - bowel reg prn    ===================ENDO====================  Type 2 DM  - A1c 8.3, sugars controlled  - Continue lantus, premeal, low ISS  - continue FS    ===================HEMATOLOGIC/ONC ===================  - H/H & plts stable  - Monitor H/H and plts  - VTE PPX: heparin gtt    ==================INFECTIOUS DISEASE================  Positive blood culture, resolved  - Repeat BCx drawn  for leukocytosis to 12k - positive on , G+ rods in anaerobic bottle, suspect contaminant  - Repeat cultures x2 sent  per transplant ID recs - no growth to date  - Vancomycin by trough (-)  - Final microbial ID: Cutibacterium acnes, per ID this is likely to be a skin contaminant, vanc dc'd.   - Dental eval  to rule out infectious etiology that would have led to bacteremia, no significant findings on exam.     Enterococcus faecalis bacteremia, resolved  - BCx + for enterococcus faecalis x2, Staph epi x1 (likely contaminant)- pan sensitive   - Urine cx  + enterococcus faecalis  - BCx  no growth   - IABP site swapped to RFA   - s/p Vancomycin 1g q12h (-)  - CT A/P negative for infectious pathology    COVID, resolved  - Off airborne precautions     Pre-transplant ID w/u   - Trend ID recs for serologies   - Colonoscopy  - normal   - Chest CT  - improved LLL aeration  - s/p immunizations    ==================DERMATOLOGY================  Upper Extremity Rash  - Patient developed bilateral upper extremity rash after receiving Hepatitis A & B immunizations on   - Dermatology consulted  - not likely to be related to vanco  - Recommend clobetasol 0.05% BID to affected areas   - RVP repeated to rule out viral etiology, negative    ?Left tissue mass  - US soft tissue - palpable abnormality within the lateral left neck - heterogeneous complex solid and cystic appearing lesion in subcutaneous neck extending into strap muscle  - f/u vascular cardiology consult in am    Lines: IABP (), RRA ()    ======================= DISPOSITION  =====================  [X] Critical   [ ] Guarded    [ ] Stable    [X] Maintain in CICU  [ ] Downgrade to Telemtry  [ ] Discharge Home    Patient requires continuous monitoring with bedside rhythm monitoring, pulse ox monitoring, and intermittent blood gas analysis. Care plan discussed with ICU care team. Patient remained critical and at risk for life threatening decompensation.  Patient seen, examined and plan discussed with CCU team during rounds.     I have personally provided 30 minutes of critical care time excluding time spent on separate procedures, in addition to initial critical care time provided by the CICU Attending, Dr. Lees/ Ladarius/ Marisa/ Saba/ Juan/ Consuelo .     Patient requires continuous monitoring with bedside rhythm monitoring, pulse ox monitoring, and intermittent blood gas analysis. Care plan discussed with ICU care team. Patient remained critical and at risk for life threatening decompensation.  Patient seen, examined and plan discussed with CCU team during rounds.     I have personally provided 30 minutes of critical care time excluding time spent on separate procedures, in addition to initial critical care time provided by the CICU Attending, Dr. Durand.    By signing my name below, I, Rosalba Tapia, attest that this documentation has been prepared under the direction and in the presence of Kitty Braden NP   Electronically signed: Rosalba Phillips, 23 @ 20:56    I, Kitty Braden, personally performed the services described in this documentation. all medical record entries made by the scribe were at my direction and in my presence. I have reviewed the chart and agree that the record reflects my personal performance and is accurate and complete  Electronically signed: Kitty Braden NP        DEVORAH VALENCIA  MRN-[id number]  Patient is a 58y old  Male who presents with a chief complaint of NSTEMI, ADHF (19 Dec 2023 07:49)    HPI:  pt seen and approx 1:30 pm  in [facility]    57yo M w/ hx HTN, CAD w/ 1 stent in , ICH () presenting with abn ekg. Patient presented to [facility] where he was found to have STEMI, recommended to get cath however patient did not want to get it there so it left and came here.  Patient initially had cough, congestion, fever, was placed on antibiotics on .  Started feeling nauseous and had a presyncopal event after which he presented to ED last night.  Had chest pain as well.  Chest pain is midsternal.  Not currently having chest pain.  Received 4 aspirin 30 min pta. (2023 15:11)    24 HOUR EVENTS: no acute events.    REVIEW OF SYSTEMS:  CONSTITUTIONAL: No weakness, fevers or chills  EYES/ENT: No visual changes;  No vertigo or throat pain   NECK: No pain or stiffness  RESPIRATORY: No cough, wheezing, hemoptysis; No shortness of breath  CARDIOVASCULAR: No chest pain or palpitations  GASTROINTESTINAL: No abdominal or epigastric pain. No nausea, vomiting, or hematemesis; No diarrhea or constipation. No melena or hematochezia.  GENITOURINARY: No dysuria, frequency or hematuria  NEUROLOGICAL: No numbness or weakness  SKIN: No itching, rashes      ICU Vital Signs Last 24 Hrs  T(C): 36.7 (19 Dec 2023 15:00), Max: 36.8 (18 Dec 2023 23:00)  T(F): 98 (19 Dec 2023 15:00), Max: 98.3 (18 Dec 2023 23:00)  HR: 79 (19 Dec 2023 18:00) (71 - 80)  BP: --  BP(mean): --  ABP: --  ABP(mean): --  RR: 19 (19 Dec 2023 18:00) (12 - 24)  SpO2: 95% (19 Dec 2023 17:00) (94% - 98%)    O2 Parameters below as of 19 Dec 2023 18:00  Patient On (Oxygen Delivery Method): room air    I&O's Summary    18 Dec 2023 07:01  -  19 Dec 2023 07:00  --------------------------------------------------------  IN: 1076 mL / OUT: 1650 mL / NET: -574 mL    19 Dec 2023 07:01  -  19 Dec 2023 20:56  --------------------------------------------------------  IN: 634 mL / OUT: 1000 mL / NET: -366 mL        CAPILLARY BLOOD GLUCOSE    CAPILLARY BLOOD GLUCOSE      POCT Blood Glucose.: 125 mg/dL (19 Dec 2023 17:11)      PHYSICAL EXAM:  GENERAL: No acute distress, well-developed  HEAD:  Atraumatic, Normocephalic  EYES: EOMI, PERRLA, conjunctiva and sclera clear  NECK: Supple, no lymphadenopathy, no JVD  CHEST/LUNG: CTAB; No wheezes, rales, or rhonchi  HEART: Regular rate and rhythm. Normal S1/S2. No murmurs, rubs, or gallops  ABDOMEN: Soft, non-tender, non-distended; normal bowel sounds, no organomegaly  EXTREMITIES:  2+ peripheral pulses b/l, No clubbing, cyanosis, or edema  NEUROLOGY: A&O x 3, no focal deficits  SKIN: No rashes or lesions  Lines: R femoral IABP dsg c/d/i    ============================I/O===========================   I&O's Detail    18 Dec 2023 07:  -  19 Dec 2023 07:00  --------------------------------------------------------  IN:    Heparin: 336 mL    IV PiggyBack: 50 mL    Oral Fluid: 690 mL  Total IN: 1076 mL    OUT:    Voided (mL): 1650 mL  Total OUT: 1650 mL    Total NET: -574 mL      19 Dec 2023 07:01  -  19 Dec 2023 20:56  --------------------------------------------------------  IN:    Heparin: 154 mL    Oral Fluid: 480 mL  Total IN: 634 mL    OUT:    Voided (mL): 1000 mL  Total OUT: 1000 mL    Total NET: -366 mL        ============================ LABS =========================                        11.8   8.24  )-----------( 193      ( 19 Dec 2023 01:56 )             35.7         132<L>  |  104  |  30<H>  ----------------------------<  141<H>  4.1   |  17<L>  |  1.03    Ca    9.7      19 Dec 2023 01:56  Phos  3.6       Mg     1.8         TPro  6.7  /  Alb  3.8  /  TBili  0.3  /  DBili  x   /  AST  27  /  ALT  75<H>  /  AlkPhos  59        LIVER FUNCTIONS - ( 19 Dec 2023 01:56 )  Alb: 3.8 g/dL / Pro: 6.7 g/dL / ALK PHOS: 60 U/L / ALT: 75 U/L / AST: 27 U/L / GGT: x           PT/INR - ( 19 Dec 2023 01:56 )   PT: 12.1 sec;   INR: 1.16 ratio         PTT - ( 19 Dec 2023 01:56 )  PTT:78.9 sec    Lactate, Blood: 0.9 mmol/L (23 @ 01:14)    Urinalysis Basic - ( 19 Dec 2023 01:56 )    Color: x / Appearance: x / SG: x / pH: x  Gluc: 141 mg/dL / Ketone: x  / Bili: x / Urobili: x   Blood: x / Protein: x / Nitrite: x   Leuk Esterase: x / RBC: x / WBC x   Sq Epi: x / Non Sq Epi: x / Bacteria: x      ======================Micro/Rad/Cardio=================  Telemtry: Reviewed   EKG: Reviewed  CXR: Reviewed  Culture: Reviewed   Echo:   Cath: Cardiac Cath Lab - Adult:   [facility]  Department of Cardiology  [address] [place] [address] [phone]  Cath Lab Report -- Comprehensive Report  Patient: LD VALENCIA  Study date: 2017  Account number: [id number]  MR number: [id number]  : 1965  Gender: Male  Race: W  Case Physician(s):  Jairon Holguin M.D.  Referring Physician:  Luc Lynn M.D.  INDICATIONS: Unstable angina - CCS4.  HISTORY: The patient has a history of coronary artery disease. The patient  hashypertension and medication-treated dyslipidemia.  PROCEDURE:  --  Left heart catheterization with ventriculography.  --  Left coronary angiography.  --  Right coronary angiography.  TECHNIQUE: The risks and alternatives of the procedures and conscious  sedation were explained to the patient and informed consent was obtained.  Cardiac catheterization performed electively.  Local anesthetic given. Right radial artery access. A 6FR PRELUDE KIT was  inserted in the vessel. Left heart catheterization. Ventriculography was  performed. A 5FR FR4.0 EXPO catheter was utilized. Left coronary artery  angiography. The vessel was injected utilizing a 5FR FL3.5 EXPO catheter.  Right coronary artery angiography. The vessel was injected utilizing a 5FR  FR4.0 EXPO catheter. RADIATION EXPOSURE: 1.1 min.  CONTRAST GIVEN: Omnipaque 55 ml.  MEDICATIONS GIVEN: Midazolam, 1 mg, IV. Fentanyl, 25 mcg, IV. Verapamil  (Isoptin, Calan, Covera), 2.5 mg, IA. Heparin, 3000 units, IA.  VENTRICLES: Global left ventricular function was moderately depressed. EF  estimated was 40 %.  CORONARY VESSELS: The coronary circulation is right dominant.  LM:   --  LM: Normal.  LAD:   --  Proximal LAD: There was a 50 % stenosis.  CX:   --  Circumflex: Normal.  RCA:   --  Mid RCA: There was a 40 % stenosis.  --  Distal RCA: There was a 50 % stenosis.  COMPLICATIONS: There were no complications.  DIAGNOSTIC RECOMMENDATIONS: The patient should continue with the present  medications.  Prepared and signed by  Jairon Holguin M.D.  Signed 2017 12:20:13  HEMODYNAMIC TABLES  Pressures:  Baseline/ Room Air  Pressures:  - HR: 78  Pressures:  - Rhythm:  Pressures:  -- Aortic Pressure (S/D/M): --/--/99  Pressures:  -- Left Ventricle (s/edp): 157/39/--  Outputs:  Baseline/ Room Air  Outputs:  -- CALCULATIONS[age]  Outputs:  -- CALCULATIONS: Body Surface Area: 2.05  Outputs:  -- CALCULATIONS: Height in cm: 175.00  Outputs:  -- CALCULATIONS: Sex: Male  Outputs:  -- CALCULATIONS: Weight in k.40 (17 @ 21:55)    ======================================================  PAST MEDICAL & SURGICAL HISTORY:  HTN (hypertension)      CAD (coronary artery disease)  ; stent      Intracranial hemorrhage        Respiratory arrest        Myocardial infarction, unspecified MI type, unspecified artery      History of coronary artery stent placement        ====================ASSESSMENT ==============  58 male with HTN, CAD (s/p PCI ), HFrEF, CVA , and T2DM presenting with chest pressure and unknown tachycardia that was shocked x1, [facility]  found to have in-stent restenosis of pLAD and  of RCA with elevated RA and PA pressures and severely decreased. Admitted to CICU for management of cardiogenic shock and ADHF requiring IABP  -, with hospital course c/b vfib arrest requiring reinsertion of IABP, currently listed for transplant status 2.    ====================== NEUROLOGY=====================  Anxiety  - No Seroquel/antipsychotics since vfib arrest and prolonged QTC  - Psych Eval, recommended SSRI, but pt. refused   - Psych recommending atarax PRN  - Continue to monitor mental status    PT/Conditioning  - Continue band exercises while on bedrest s/t IABP    ==================== RESPIRATORY======================  Acute Hypoxemic Respiratory Failure  - s/p x2 intubations for cardiogenic pulm edema and the in setting of cardiac arrest, resolved - extubated 11/10  - Currently maintaining >95% sats on room air  - Continue incentive spirometry and monitoring of sp02    Asthma  - c/w albuterol, symbicort and spiriva  - On trelegy at home  - Continue to monitor SpO2 with goal >94%    ====================CARDIOVASCULAR==================  Vfib arrest i/s/o ischemia  - Lido gtt off   - PO Amio load - total of 5g per EP complete   - Amio dc'd  for rising LFTs  - Keep K > 4, Mag > 2.2     Cardiogenic shock requiring IABP (- , -)  - Likely 2/2 NSTEMI and ADHF  -  LHC: pLAD 100 % in-stent restenosis & mRCA, 100 %. PCWP 30. IABP placed.  -  TTE: LV dilated. EF 32 %. Regional WMAs present, mod (grade 2) LV diastolic dysfunction  -  TTE: EF 22% and + LV thrombus  - IABP swapped  to RFA, continue 1:1 support - switched sensor to R radial a-line  due to poor sensing on groin line  - Off Milrinone gtt @ 7:30 am   - [place] d/c'd due to elevated K levels  - c/w coreg 25 BID for GDMT  - UNOS status 2 as of , pending DCD   - hydralazine 50 TID and ISD 30 TID for AL reduction    NSTEMI iso stent re-occlusion of pLAD and 100%  of RCA  - EKG on admission w/ LBBB  - DAPT: c/w ASA, Brilinta d/c'd per transplant w/u  - c/w lipitor 80  - cMR deferred given necessity of IABP  - CT sx not recommending CABG, undergoing AT eval    LV thrombus  - c/w heparin gtt w/ therapeutic PTT    ===================== RENAL =========================  Non-oliguric ARIC, resolved   - Baseline Cr: -  - Renal US: no evidence of renal artery stenosis  - Trend BMP, lytes daily, replace as needed  - Continue Strict I/Os, avoid nephrotoxins    =============== GASTROINTESTINAL===================  Constipation/ileus, resolved  - regular diet  - bowel reg prn    ===================ENDO====================  Type 2 DM  - A1c 8.3, sugars controlled  - Continue lantus, premeal, low ISS  - continue FS    ===================HEMATOLOGIC/ONC ===================  - H/H & plts stable  - Monitor H/H and plts  - VTE PPX: heparin gtt    ==================INFECTIOUS DISEASE================  Positive blood culture, resolved  - Repeat BCx drawn  for leukocytosis to 12k - positive on , G+ rods in anaerobic bottle, suspect contaminant  - Repeat cultures x2 sent  per transplant ID recs - no growth to date  - Vancomycin by trough (-)  - Final microbial ID: Cutibacterium acnes, per ID this is likely to be a skin contaminant, vanc dc'd.   - Dental eval  to rule out infectious etiology that would have led to bacteremia, no significant findings on exam.     Enterococcus faecalis bacteremia, resolved  - BCx + for enterococcus faecalis x2, Staph epi x1 (likely contaminant)- pan sensitive   - Urine cx  + enterococcus faecalis  - BCx  no growth   - IABP site swapped to RFA   - s/p Vancomycin 1g q12h (-)  - CT A/P negative for infectious pathology    COVID, resolved  - Off airborne precautions     Pre-transplant ID w/u   - Trend ID recs for serologies   - Colonoscopy  - normal   - Chest CT  - improved LLL aeration  - s/p immunizations    ==================DERMATOLOGY================  Upper Extremity Rash  - Patient developed bilateral upper extremity rash after receiving Hepatitis A & B immunizations on   - Dermatology consulted  - not likely to be related to vanco  - Recommend clobetasol 0.05% BID to affected areas   - RVP repeated to rule out viral etiology, negative    ?Left tissue mass  - US soft tissue - palpable abnormality within the lateral left neck - heterogeneous complex solid and cystic appearing lesion in subcutaneous neck extending into strap muscle  - f/u vascular cardiology consult in am    Lines: IABP (), RRA ()    ======================= DISPOSITION  =====================  [X] Critical   [ ] Guarded    [ ] Stable    [X] Maintain in CICU  [ ] Downgrade to Telemtry  [ ] Discharge Home    Patient requires continuous monitoring with bedside rhythm monitoring, pulse ox monitoring, and intermittent blood gas analysis. Care plan discussed with ICU care team. Patient remained critical and at risk for life threatening decompensation.  Patient seen, examined and plan discussed with CCU team during rounds.     I have personally provided 30 minutes of critical care time excluding time spent on separate procedures, in addition to initial critical care time provided by the CICU Attending, Dr. Lees/ Ladarius/ Marisa/ Saba/ Juan/ Consuelo .     Patient requires continuous monitoring with bedside rhythm monitoring, pulse ox monitoring, and intermittent blood gas analysis. Care plan discussed with ICU care team. Patient remained critical and at risk for life threatening decompensation.  Patient seen, examined and plan discussed with CCU team during rounds.     I have personally provided 30 minutes of critical care time excluding time spent on separate procedures, in addition to initial critical care time provided by the CICU Attending, Dr. Durand.    By signing my name below, I, Rosalba Tapia, attest that this documentation has been prepared under the direction and in the presence of Kitty Braden NP   Electronically signed: Rosalba Phillips, 23 @ 20:56    I, Kitty Braden, personally performed the services described in this documentation. all medical record entries made by the scribe were at my direction and in my presence. I have reviewed the chart and agree that the record reflects my personal performance and is accurate and complete  Electronically signed: Kitty Braden NP        DEOVRAH VALENCIA  MRN-[id number]  Patient is a 58y old  Male who presents with a chief complaint of NSTEMI, ADHF (19 Dec 2023 07:49)    HPI:  pt seen and approx 1:30 pm  in [facility]    59yo M w/ hx HTN, CAD w/ 1 stent in , ICH () presenting with abn ekg. Patient presented to [facility] where he was found to have STEMI, recommended to get cath however patient did not want to get it there so it left and came here.  Patient initially had cough, congestion, fever, was placed on antibiotics on .  Started feeling nauseous and had a presyncopal event after which he presented to ED last night.  Had chest pain as well.  Chest pain is midsternal.  Not currently having chest pain.  Received 4 aspirin 30 min pta. (2023 15:11)    24 HOUR EVENTS: no acute events.    REVIEW OF SYSTEMS:  CONSTITUTIONAL: No weakness, fevers or chills  EYES/ENT: No visual changes;  No vertigo or throat pain   NECK: No pain or stiffness  RESPIRATORY: No cough, wheezing, hemoptysis; No shortness of breath  CARDIOVASCULAR: No chest pain or palpitations  GASTROINTESTINAL: No abdominal or epigastric pain. No nausea, vomiting, or hematemesis; No diarrhea or constipation. No melena or hematochezia.  GENITOURINARY: No dysuria, frequency or hematuria  NEUROLOGICAL: No numbness or weakness  SKIN: No itching, rashes      ICU Vital Signs Last 24 Hrs  T(C): 36.7 (19 Dec 2023 15:00), Max: 36.8 (18 Dec 2023 23:00)  T(F): 98 (19 Dec 2023 15:00), Max: 98.3 (18 Dec 2023 23:00)  HR: 79 (19 Dec 2023 18:00) (71 - 80)  BP: --  BP(mean): --  ABP: --  ABP(mean): --  RR: 19 (19 Dec 2023 18:00) (12 - 24)  SpO2: 95% (19 Dec 2023 17:00) (94% - 98%)    O2 Parameters below as of 19 Dec 2023 18:00  Patient On (Oxygen Delivery Method): room air    I&O's Summary    18 Dec 2023 07:01  -  19 Dec 2023 07:00  --------------------------------------------------------  IN: 1076 mL / OUT: 1650 mL / NET: -574 mL    19 Dec 2023 07:01  -  19 Dec 2023 20:56  --------------------------------------------------------  IN: 634 mL / OUT: 1000 mL / NET: -366 mL        CAPILLARY BLOOD GLUCOSE    CAPILLARY BLOOD GLUCOSE      POCT Blood Glucose.: 125 mg/dL (19 Dec 2023 17:11)      PHYSICAL EXAM:  GENERAL: No acute distress, well-developed  HEAD:  Atraumatic, Normocephalic  EYES: EOMI, PERRLA, conjunctiva and sclera clear  NECK: Supple, no lymphadenopathy, no JVD  CHEST/LUNG: CTAB; No wheezes, rales, or rhonchi  HEART: Regular rate and rhythm. Normal S1/S2. No murmurs, rubs, or gallops  ABDOMEN: Soft, non-tender, non-distended; normal bowel sounds, no organomegaly  EXTREMITIES:  2+ peripheral pulses b/l, No clubbing, cyanosis, or edema  NEUROLOGY: A&O x 3, no focal deficits  SKIN: No rashes or lesions  Lines: R femoral IABP dsg c/d/i    ============================I/O===========================   I&O's Detail    18 Dec 2023 07:  -  19 Dec 2023 07:00  --------------------------------------------------------  IN:    Heparin: 336 mL    IV PiggyBack: 50 mL    Oral Fluid: 690 mL  Total IN: 1076 mL    OUT:    Voided (mL): 1650 mL  Total OUT: 1650 mL    Total NET: -574 mL      19 Dec 2023 07:01  -  19 Dec 2023 20:56  --------------------------------------------------------  IN:    Heparin: 154 mL    Oral Fluid: 480 mL  Total IN: 634 mL    OUT:    Voided (mL): 1000 mL  Total OUT: 1000 mL    Total NET: -366 mL        ============================ LABS =========================                        11.8   8.24  )-----------( 193      ( 19 Dec 2023 01:56 )             35.7         132<L>  |  104  |  30<H>  ----------------------------<  141<H>  4.1   |  17<L>  |  1.03    Ca    9.7      19 Dec 2023 01:56  Phos  3.6       Mg     1.8         TPro  6.7  /  Alb  3.8  /  TBili  0.3  /  DBili  x   /  AST  27  /  ALT  75<H>  /  AlkPhos  60        LIVER FUNCTIONS - ( 19 Dec 2023 01:56 )  Alb: 3.8 g/dL / Pro: 6.7 g/dL / ALK PHOS: 60 U/L / ALT: 75 U/L / AST: 27 U/L / GGT: x           PT/INR - ( 19 Dec 2023 01:56 )   PT: 12.1 sec;   INR: 1.16 ratio         PTT - ( 19 Dec 2023 01:56 )  PTT:78.9 sec    Lactate, Blood: 0.9 mmol/L (23 @ 01:14)    Urinalysis Basic - ( 19 Dec 2023 01:56 )    Color: x / Appearance: x / SG: x / pH: x  Gluc: 141 mg/dL / Ketone: x  / Bili: x / Urobili: x   Blood: x / Protein: x / Nitrite: x   Leuk Esterase: x / RBC: x / WBC x   Sq Epi: x / Non Sq Epi: x / Bacteria: x      ======================Micro/Rad/Cardio=================  Telemtry: Reviewed   EKG: Reviewed  CXR: Reviewed  Culture: Reviewed   Echo:   Cath: Cardiac Cath Lab - Adult:   [facility]  Department of Cardiology  [address] [place] [address] [phone]  Cath Lab Report -- Comprehensive Report  Patient: LD VALENCIA  Study date: 2017  Account number: [id number]  MR number: [id number]  : 1965  Gender: Male  Race: W  Case Physician(s):  Jairon Holguin M.D.  Referring Physician:  Luc Lynn M.D.  INDICATIONS: Unstable angina - CCS4.  HISTORY: The patient has a history of coronary artery disease. The patient  hashypertension and medication-treated dyslipidemia.  PROCEDURE:  --  Left heart catheterization with ventriculography.  --  Left coronary angiography.  --  Right coronary angiography.  TECHNIQUE: The risks and alternatives of the procedures and conscious  sedation were explained to the patient and informed consent was obtained.  Cardiac catheterization performed electively.  Local anesthetic given. Right radial artery access. A 6FR PRELUDE KIT was  inserted in the vessel. Left heart catheterization. Ventriculography was  performed. A 5FR FR4.0 EXPO catheter was utilized. Left coronary artery  angiography. The vessel was injected utilizing a 5FR FL3.5 EXPO catheter.  Right coronary artery angiography. The vessel was injected utilizing a 5FR  FR4.0 EXPO catheter. RADIATION EXPOSURE: 1.1 min.  CONTRAST GIVEN: Omnipaque 55 ml.  MEDICATIONS GIVEN: Midazolam, 1 mg, IV. Fentanyl, 25 mcg, IV. Verapamil  (Isoptin, Calan, Covera), 2.5 mg, IA. Heparin, 3000 units, IA.  VENTRICLES: Global left ventricular function was moderately depressed. EF  estimated was 40 %.  CORONARY VESSELS: The coronary circulation is right dominant.  LM:   --  LM: Normal.  LAD:   --  Proximal LAD: There was a 50 % stenosis.  CX:   --  Circumflex: Normal.  RCA:   --  Mid RCA: There was a 40 % stenosis.  --  Distal RCA: There was a 50 % stenosis.  COMPLICATIONS: There were no complications.  DIAGNOSTIC RECOMMENDATIONS: The patient should continue with the present  medications.  Prepared and signed by  Jairon Holguin M.D.  Signed 2017 12:20:13  HEMODYNAMIC TABLES  Pressures:  Baseline/ Room Air  Pressures:  - HR: 78  Pressures:  - Rhythm:  Pressures:  -- Aortic Pressure (S/D/M): --/--/99  Pressures:  -- Left Ventricle (s/edp): 157/39/--  Outputs:  Baseline/ Room Air  Outputs:  -- CALCULATIONS[age]  Outputs:  -- CALCULATIONS: Body Surface Area: 2.05  Outputs:  -- CALCULATIONS: Height in cm: 175.00  Outputs:  -- CALCULATIONS: Sex: Male  Outputs:  -- CALCULATIONS: Weight in k.40 (17 @ 21:55)    ======================================================  PAST MEDICAL & SURGICAL HISTORY:  HTN (hypertension)      CAD (coronary artery disease)  ; stent      Intracranial hemorrhage        Respiratory arrest        Myocardial infarction, unspecified MI type, unspecified artery      History of coronary artery stent placement        ====================ASSESSMENT ==============  58 male with HTN, CAD (s/p PCI ), HFrEF, CVA , and T2DM presenting with chest pressure and unknown tachycardia that was shocked x1, [facility]  found to have in-stent restenosis of pLAD and  of RCA with elevated RA and PA pressures and severely decreased. Admitted to CICU for management of cardiogenic shock and ADHF requiring IABP  -, with hospital course c/b vfib arrest requiring reinsertion of IABP, currently listed for transplant status 2.    ====================== NEUROLOGY=====================  Anxiety  - No Seroquel/antipsychotics since vfib arrest and prolonged QTC  - Psych Eval, recommended SSRI, but pt. refused   - Psych recommending atarax PRN  - Continue to monitor mental status    PT/Conditioning  - Continue band exercises while on bedrest s/t IABP    ==================== RESPIRATORY======================  Acute Hypoxemic Respiratory Failure  - s/p x2 intubations for cardiogenic pulm edema and the in setting of cardiac arrest, resolved - extubated 11/10  - Currently maintaining >95% sats on room air  - Continue incentive spirometry and monitoring of sp02    Asthma  - c/w albuterol, symbicort and spiriva  - On trelegy at home  - Continue to monitor SpO2 with goal >94%    ====================CARDIOVASCULAR==================  Vfib arrest i/s/o ischemia  - Lido gtt off   - PO Amio load - total of 5g per EP complete   - Amio dc'd  for rising LFTs  - Keep K > 4, Mag > 2.2     Cardiogenic shock requiring IABP (- , -)  - Likely 2/2 NSTEMI and ADHF  -  LHC: pLAD 100 % in-stent restenosis & mRCA, 100 %. PCWP 30. IABP placed.  -  TTE: LV dilated. EF 32 %. Regional WMAs present, mod (grade 2) LV diastolic dysfunction  -  TTE: EF 22% and + LV thrombus  - IABP swapped  to RFA, continue 1:1 support - switched sensor to R radial a-line  due to poor sensing on groin line  - Off Milrinone gtt @ 7:30 am   - [place] d/c'd due to elevated K levels  - c/w coreg 25 BID for GDMT  - UNOS status 2 as of , pending DCD   - hydralazine 50 TID and ISD 30 TID for AL reduction    NSTEMI iso stent re-occlusion of pLAD and 100%  of RCA  - EKG on admission w/ LBBB  - DAPT: c/w ASA, Brilinta d/c'd per transplant w/u  - c/w lipitor 80  - cMR deferred given necessity of IABP  - CT sx not recommending CABG, undergoing AT eval    LV thrombus  - c/w heparin gtt w/ therapeutic PTT    ===================== RENAL =========================  Non-oliguric ARIC, resolved   - Baseline Cr: -  - Renal US: no evidence of renal artery stenosis  - Trend BMP, lytes daily, replace as needed  - Continue Strict I/Os, avoid nephrotoxins    =============== GASTROINTESTINAL===================  Constipation/ileus, resolved  - regular diet  - bowel reg prn    ===================ENDO====================  Type 2 DM  - A1c 8.3, sugars controlled  - Continue lantus, premeal, low ISS  - continue FS    ===================HEMATOLOGIC/ONC ===================  - H/H & plts stable  - Monitor H/H and plts  - VTE PPX: heparin gtt    ==================INFECTIOUS DISEASE================  Positive blood culture, resolved  - Repeat BCx drawn  for leukocytosis to 12k - positive on , G+ rods in anaerobic bottle, suspect contaminant  - Repeat cultures x2 sent  per transplant ID recs - no growth to date  - Vancomycin by trough (-)  - Final microbial ID: Cutibacterium acnes, per ID this is likely to be a skin contaminant, vanc dc'd.   - Dental eval  to rule out infectious etiology that would have led to bacteremia, no significant findings on exam.     Enterococcus faecalis bacteremia, resolved  - BCx + for enterococcus faecalis x2, Staph epi x1 (likely contaminant)- pan sensitive   - Urine cx  + enterococcus faecalis  - BCx  no growth   - IABP site swapped to RFA   - s/p Vancomycin 1g q12h (-)  - CT A/P negative for infectious pathology    COVID, resolved  - Off airborne precautions     Pre-transplant ID w/u   - Trend ID recs for serologies   - Colonoscopy  - normal   - Chest CT  - improved LLL aeration  - s/p immunizations    ==================DERMATOLOGY================  Upper Extremity Rash  - Patient developed bilateral upper extremity rash after receiving Hepatitis A & B immunizations on   - Dermatology consulted  - not likely to be related to vanco  - Recommend clobetasol 0.05% BID to affected areas   - RVP repeated to rule out viral etiology, negative    ?Left tissue mass  - US soft tissue - palpable abnormality within the lateral left neck - heterogeneous complex solid and cystic appearing lesion in subcutaneous neck extending into strap muscle  - f/u vascular cardiology consult in am    Lines: IABP (), RRA ()    ======================= DISPOSITION  =====================  [X] Critical   [ ] Guarded    [ ] Stable    [X] Maintain in CICU  [ ] Downgrade to Telemtry  [ ] Discharge Home    Patient requires continuous monitoring with bedside rhythm monitoring, pulse ox monitoring, and intermittent blood gas analysis. Care plan discussed with ICU care team. Patient remained critical and at risk for life threatening decompensation.  Patient seen, examined and plan discussed with CCU team during rounds.     I have personally provided 30 minutes of critical care time excluding time spent on separate procedures, in addition to initial critical care time provided by the CICU Attending, Dr. Lees/ Ladarius/ Marisa/ Saba/ Juan/ Consuelo .     Patient requires continuous monitoring with bedside rhythm monitoring, pulse ox monitoring, and intermittent blood gas analysis. Care plan discussed with ICU care team. Patient remained critical and at risk for life threatening decompensation.  Patient seen, examined and plan discussed with CCU team during rounds.     I have personally provided 30 minutes of critical care time excluding time spent on separate procedures, in addition to initial critical care time provided by the CICU Attending, Dr. Durand.    By signing my name below, I, Rosalba Tapia, attest that this documentation has been prepared under the direction and in the presence of Kitty Braden NP   Electronically signed: Rosalba Phillips, 23 @ 20:56    I, Kitty Braden, personally performed the services described in this documentation. all medical record entries made by the scribe were at my direction and in my presence. I have reviewed the chart and agree that the record reflects my personal performance and is accurate and complete  Electronically signed: Kitty Braden NP     ======================= DISPOSITION  =====================  [X] Critical   [ ] Guarded    [ ] Stable    [X] Maintain in CICU  [ ] Downgrade to Telemetry  [ ] Discharge Home    Patient requires continuous monitoring with bedside rhythm monitoring, pulse ox monitoring, and intermittent blood gas analysis. Care plan discussed with ICU care team. Patient remained critical and at risk for life threatening decompensation.  Patient seen, examined and plan discussed with CCU team during rounds.     I have personally provided 30 minutes of critical care time excluding time spent on separate procedures, in addition to initial critical care time provided by the CICU Attending, Dr. Durand.      Rita Roquedelroy [facility] [phone]       DEVORAH VALENCIA  MRN-[id number]  Patient is a 58y old  Male who presents with a chief complaint of NSTEMI, ADHF (19 Dec 2023 07:49)    HPI:  pt seen and approx 1:30 pm  in [facility]    57yo M w/ hx HTN, CAD w/ 1 stent in , ICH () presenting with abn ekg. Patient presented to [facility] where he was found to have STEMI, recommended to get cath however patient did not want to get it there so it left and came here.  Patient initially had cough, congestion, fever, was placed on antibiotics on .  Started feeling nauseous and had a presyncopal event after which he presented to ED last night.  Had chest pain as well.  Chest pain is midsternal.  Not currently having chest pain.  Received 4 aspirin 30 min pta. (2023 15:11)    24 HOUR EVENTS: no acute events.    REVIEW OF SYSTEMS:  CONSTITUTIONAL: No weakness, fevers or chills  EYES/ENT: No visual changes;  No vertigo or throat pain   NECK: No pain or stiffness  RESPIRATORY: No cough, wheezing, hemoptysis; No shortness of breath  CARDIOVASCULAR: No chest pain or palpitations  GASTROINTESTINAL: No abdominal or epigastric pain. No nausea, vomiting, or hematemesis; No diarrhea or constipation. No melena or hematochezia.  GENITOURINARY: No dysuria, frequency or hematuria  NEUROLOGICAL: No numbness or weakness  SKIN: No itching, rashes      ICU Vital Signs Last 24 Hrs  T(C): 36.7 (19 Dec 2023 15:00), Max: 36.8 (18 Dec 2023 23:00)  T(F): 98 (19 Dec 2023 15:00), Max: 98.3 (18 Dec 2023 23:00)  HR: 79 (19 Dec 2023 18:00) (71 - 80)  BP: --  BP(mean): --  ABP: --  ABP(mean): --  RR: 19 (19 Dec 2023 18:00) (12 - 24)  SpO2: 95% (19 Dec 2023 17:00) (94% - 98%)    O2 Parameters below as of 19 Dec 2023 18:00  Patient On (Oxygen Delivery Method): room air    I&O's Summary    18 Dec 2023 07:01  -  19 Dec 2023 07:00  --------------------------------------------------------  IN: 1076 mL / OUT: 1650 mL / NET: -574 mL    19 Dec 2023 07:01  -  19 Dec 2023 20:56  --------------------------------------------------------  IN: 634 mL / OUT: 1000 mL / NET: -366 mL        CAPILLARY BLOOD GLUCOSE    CAPILLARY BLOOD GLUCOSE      POCT Blood Glucose.: 125 mg/dL (19 Dec 2023 17:11)      PHYSICAL EXAM:  GENERAL: No acute distress, well-developed  HEAD:  Atraumatic, Normocephalic  EYES: EOMI, PERRLA, conjunctiva and sclera clear  NECK: Supple, no lymphadenopathy, no JVD  CHEST/LUNG: CTAB; No wheezes, rales, or rhonchi  HEART: Regular rate and rhythm. Normal S1/S2. No murmurs, rubs, or gallops  ABDOMEN: Soft, non-tender, non-distended; normal bowel sounds, no organomegaly  EXTREMITIES:  2+ peripheral pulses b/l, No clubbing, cyanosis, or edema  NEUROLOGY: A&O x 3, no focal deficits  SKIN: No rashes or lesions  Lines: R femoral IABP dsg c/d/i    ============================I/O===========================   I&O's Detail    18 Dec 2023 07:  -  19 Dec 2023 07:00  --------------------------------------------------------  IN:    Heparin: 336 mL    IV PiggyBack: 50 mL    Oral Fluid: 690 mL  Total IN: 1076 mL    OUT:    Voided (mL): 1650 mL  Total OUT: 1650 mL    Total NET: -574 mL      19 Dec 2023 07:01  -  19 Dec 2023 20:56  --------------------------------------------------------  IN:    Heparin: 154 mL    Oral Fluid: 480 mL  Total IN: 634 mL    OUT:    Voided (mL): 1000 mL  Total OUT: 1000 mL    Total NET: -366 mL        ============================ LABS =========================                        11.8   8.24  )-----------( 193      ( 19 Dec 2023 01:56 )             35.7         132<L>  |  104  |  30<H>  ----------------------------<  141<H>  4.1   |  17<L>  |  1.03    Ca    9.7      19 Dec 2023 01:56  Phos  3.6       Mg     1.8         TPro  6.7  /  Alb  3.8  /  TBili  0.3  /  DBili  x   /  AST  27  /  ALT  75<H>  /  AlkPhos  60        LIVER FUNCTIONS - ( 19 Dec 2023 01:56 )  Alb: 3.8 g/dL / Pro: 6.7 g/dL / ALK PHOS: 60 U/L / ALT: 75 U/L / AST: 27 U/L / GGT: x           PT/INR - ( 19 Dec 2023 01:56 )   PT: 12.1 sec;   INR: 1.16 ratio         PTT - ( 19 Dec 2023 01:56 )  PTT:78.9 sec    Lactate, Blood: 0.9 mmol/L (23 @ 01:14)    Urinalysis Basic - ( 19 Dec 2023 01:56 )    Color: x / Appearance: x / SG: x / pH: x  Gluc: 141 mg/dL / Ketone: x  / Bili: x / Urobili: x   Blood: x / Protein: x / Nitrite: x   Leuk Esterase: x / RBC: x / WBC x   Sq Epi: x / Non Sq Epi: x / Bacteria: x      ======================Micro/Rad/Cardio=================  Telemtry: Reviewed   EKG: Reviewed  CXR: Reviewed  Culture: Reviewed   Echo:   Cath: Cardiac Cath Lab - Adult:   [facility]  Department of Cardiology  [address] [place] [address] [phone]  Cath Lab Report -- Comprehensive Report  Patient: LD VALENCIA  Study date: 2017  Account number: [id number]  MR number: [id number]  : 1965  Gender: Male  Race: W  Case Physician(s):  Jairon Holguin M.D.  Referring Physician:  Luc Lynn M.D.  INDICATIONS: Unstable angina - CCS4.  HISTORY: The patient has a history of coronary artery disease. The patient  hashypertension and medication-treated dyslipidemia.  PROCEDURE:  --  Left heart catheterization with ventriculography.  --  Left coronary angiography.  --  Right coronary angiography.  TECHNIQUE: The risks and alternatives of the procedures and conscious  sedation were explained to the patient and informed consent was obtained.  Cardiac catheterization performed electively.  Local anesthetic given. Right radial artery access. A 6FR PRELUDE KIT was  inserted in the vessel. Left heart catheterization. Ventriculography was  performed. A 5FR FR4.0 EXPO catheter was utilized. Left coronary artery  angiography. The vessel was injected utilizing a 5FR FL3.5 EXPO catheter.  Right coronary artery angiography. The vessel was injected utilizing a 5FR  FR4.0 EXPO catheter. RADIATION EXPOSURE: 1.1 min.  CONTRAST GIVEN: Omnipaque 55 ml.  MEDICATIONS GIVEN: Midazolam, 1 mg, IV. Fentanyl, 25 mcg, IV. Verapamil  (Isoptin, Calan, Covera), 2.5 mg, IA. Heparin, 3000 units, IA.  VENTRICLES: Global left ventricular function was moderately depressed. EF  estimated was 40 %.  CORONARY VESSELS: The coronary circulation is right dominant.  LM:   --  LM: Normal.  LAD:   --  Proximal LAD: There was a 50 % stenosis.  CX:   --  Circumflex: Normal.  RCA:   --  Mid RCA: There was a 40 % stenosis.  --  Distal RCA: There was a 50 % stenosis.  COMPLICATIONS: There were no complications.  DIAGNOSTIC RECOMMENDATIONS: The patient should continue with the present  medications.  Prepared and signed by  Jairon Holguin M.D.  Signed 2017 12:20:13  HEMODYNAMIC TABLES  Pressures:  Baseline/ Room Air  Pressures:  - HR: 78  Pressures:  - Rhythm:  Pressures:  -- Aortic Pressure (S/D/M): --/--/99  Pressures:  -- Left Ventricle (s/edp): 157/39/--  Outputs:  Baseline/ Room Air  Outputs:  -- CALCULATIONS[age]  Outputs:  -- CALCULATIONS: Body Surface Area: 2.05  Outputs:  -- CALCULATIONS: Height in cm: 175.00  Outputs:  -- CALCULATIONS: Sex: Male  Outputs:  -- CALCULATIONS: Weight in k.40 (17 @ 21:55)    ======================================================  PAST MEDICAL & SURGICAL HISTORY:  HTN (hypertension)      CAD (coronary artery disease)  ; stent      Intracranial hemorrhage        Respiratory arrest        Myocardial infarction, unspecified MI type, unspecified artery      History of coronary artery stent placement        ====================ASSESSMENT ==============  58 male with HTN, CAD (s/p PCI ), HFrEF, CVA , and T2DM presenting with chest pressure and unknown tachycardia that was shocked x1, [facility]  found to have in-stent restenosis of pLAD and  of RCA with elevated RA and PA pressures and severely decreased. Admitted to CICU for management of cardiogenic shock and ADHF requiring IABP  -, with hospital course c/b vfib arrest requiring reinsertion of IABP, currently listed for transplant status 2.    ====================== NEUROLOGY=====================  Anxiety  - No Seroquel/antipsychotics since vfib arrest and prolonged QTC  - Psych Eval, recommended SSRI, but pt. refused   - Psych recommending atarax PRN  - Continue to monitor mental status    PT/Conditioning  - Continue band exercises while on bedrest s/t IABP    ==================== RESPIRATORY======================  Acute Hypoxemic Respiratory Failure  - s/p x2 intubations for cardiogenic pulm edema and the in setting of cardiac arrest, resolved - extubated 11/10  - Currently maintaining >95% sats on room air  - Continue incentive spirometry and monitoring of sp02    Asthma  - c/w albuterol, symbicort and spiriva  - On trelegy at home  - Continue to monitor SpO2 with goal >94%    ====================CARDIOVASCULAR==================  Vfib arrest i/s/o ischemia  - [place] gtt off   - PO Amio load - total of 5g per EP complete   - Amio dc'd  for rising LFTs  - Keep K > 4, Mag > 2.2     Cardiogenic shock requiring IABP (- , -)  - Likely 2/2 NSTEMI and ADHF  -  LHC: pLAD 100 % in-stent restenosis & mRCA, 100 %. PCWP 30. IABP placed.  -  TTE: LV dilated. EF 32 %. Regional WMAs present, mod (grade 2) LV diastolic dysfunction  -  TTE: EF 22% and + LV thrombus  - IABP swapped  to RFA, continue 1:1 support - switched sensor to R radial a-line  due to poor sensing on groin line  - Off Milrinone gtt @ 7:30 am   - [place] d/c'd due to elevated K levels  - c/w coreg 25 BID for GDMT  - UNOS status 2 as of , pending DCD   - hydralazine 50 TID and ISD 30 TID for AL reduction    NSTEMI iso stent re-occlusion of pLAD and 100%  of RCA  - EKG on admission w/ LBBB  - DAPT: c/w ASA, Brilinta d/c'd per transplant w/u  - c/w lipitor 80  - cMR deferred given necessity of IABP  - CT sx not recommending CABG, undergoing AT eval    LV thrombus  - c/w heparin gtt w/ therapeutic PTT    ===================== RENAL =========================  Non-oliguric ARIC, resolved   - Baseline Cr: -  - Renal US: no evidence of renal artery stenosis  - Trend BMP, lytes daily, replace as needed  - Continue Strict I/Os, avoid nephrotoxins    =============== GASTROINTESTINAL===================  Constipation/ileus, resolved  - regular diet  - bowel reg prn    ===================ENDO====================  Type 2 DM  - A1c 8.3, sugars controlled  - Continue lantus, premeal, low ISS  - continue FS    ===================HEMATOLOGIC/ONC ===================  - H/H & plts stable  - Monitor H/H and plts  - VTE PPX: heparin gtt    ==================INFECTIOUS DISEASE================  Positive blood culture, resolved  - Repeat BCx drawn  for leukocytosis to 12k - positive on , G+ rods in anaerobic bottle, suspect contaminant  - Repeat cultures x2 sent  per transplant ID recs - no growth to date  - Vancomycin by trough (-)  - Final microbial ID: Cutibacterium acnes, per ID this is likely to be a skin contaminant, vanc dc'd.   - Dental eval  to rule out infectious etiology that would have led to bacteremia, no significant findings on exam.     Enterococcus faecalis bacteremia, resolved  - BCx + for enterococcus faecalis x2, Staph epi x1 (likely contaminant)- pan sensitive   - Urine cx  + enterococcus faecalis  - BCx  no growth   - IABP site swapped to RFA   - s/p Vancomycin 1g q12h (-)  - CT A/P negative for infectious pathology    COVID, resolved  - Off airborne precautions     Pre-transplant ID w/u   - Trend ID recs for serologies   - Colonoscopy  - normal   - Chest CT  - improved LLL aeration  - s/p immunizations    ==================DERMATOLOGY================  Upper Extremity Rash  - Patient developed bilateral upper extremity rash after receiving Hepatitis A & B immunizations on   - Dermatology consulted  - not likely to be related to vanco  - Recommend clobetasol 0.05% BID to affected areas   - RVP repeated to rule out viral etiology, negative    ?Left tissue mass  - US soft tissue - palpable abnormality within the lateral left neck - heterogeneous complex solid and cystic appearing lesion in subcutaneous neck extending into strap muscle  - f/u vascular cardiology consult in am    Lines: IABP (), RRA ()    ======================= DISPOSITION  =====================  [X] Critical   [ ] Guarded    [ ] Stable    [X] Maintain in CICU  [ ] Downgrade to Telemtry  [ ] Discharge Home    Patient requires continuous monitoring with bedside rhythm monitoring, pulse ox monitoring, and intermittent blood gas analysis. Care plan discussed with ICU care team. Patient remained critical and at risk for life threatening decompensation.  Patient seen, examined and plan discussed with CCU team during rounds.     I have personally provided 30 minutes of critical care time excluding time spent on separate procedures, in addition to initial critical care time provided by the CICU Attending, Dr. Lees/ Ladarius/ Marisa/ Saba/ Juan/ Consuelo .     Patient requires continuous monitoring with bedside rhythm monitoring, pulse ox monitoring, and intermittent blood gas analysis. Care plan discussed with ICU care team. Patient remained critical and at risk for life threatening decompensation.  Patient seen, examined and plan discussed with CCU team during rounds.     I have personally provided 30 minutes of critical care time excluding time spent on separate procedures, in addition to initial critical care time provided by the CICU Attending, Dr. Durand.    By signing my name below, I, Rosalba Tapia, attest that this documentation has been prepared under the direction and in the presence of Kitty Braden NP   Electronically signed: Rosalba Phillips, 23 @ 20:56    I, Kitty Braden, personally performed the services described in this documentation. all medical record entries made by the scribe were at my direction and in my presence. I have reviewed the chart and agree that the record reflects my personal performance and is accurate and complete  Electronically signed: Kitty Braden NP     ======================= DISPOSITION  =====================  [X] Critical   [ ] Guarded    [ ] Stable    [X] Maintain in CICU  [ ] Downgrade to Telemetry  [ ] Discharge Home    Patient requires continuous monitoring with bedside rhythm monitoring, pulse ox monitoring, and intermittent blood gas analysis. Care plan discussed with ICU care team. Patient remained critical and at risk for life threatening decompensation.  Patient seen, examined and plan discussed with CCU team during rounds.     I have personally provided 30 minutes of critical care time excluding time spent on separate procedures, in addition to initial critical care time provided by the CICU Attending, Dr. Durand.      Rita Roquedelroy [facility] [phone]

## 2023-12-20 LAB
GLUCOSE BLDC GLUCOMTR-MCNC: 152 MG/DL — HIGH (ref 70–99)
GLUCOSE BLDC GLUCOMTR-MCNC: 152 MG/DL — HIGH (ref 70–99)
GLUCOSE BLDC GLUCOMTR-MCNC: 157 MG/DL — HIGH (ref 70–99)
GLUCOSE BLDC GLUCOMTR-MCNC: 157 MG/DL — HIGH (ref 70–99)
GLUCOSE BLDC GLUCOMTR-MCNC: 177 MG/DL — HIGH (ref 70–99)
GLUCOSE BLDC GLUCOMTR-MCNC: 177 MG/DL — HIGH (ref 70–99)
GLUCOSE BLDC GLUCOMTR-MCNC: 201 MG/DL — HIGH (ref 70–99)
GLUCOSE BLDC GLUCOMTR-MCNC: 201 MG/DL — HIGH (ref 70–99)
HBV SURFACE AB SER-ACNC: 5 MIU/ML — LOW
HBV SURFACE AB SER-ACNC: 5 MIU/ML — LOW

## 2023-12-20 PROCEDURE — 99292 CRITICAL CARE ADDL 30 MIN: CPT | Mod: 25

## 2023-12-20 PROCEDURE — 93971 EXTREMITY STUDY: CPT | Mod: 26,LT

## 2023-12-20 PROCEDURE — 99232 SBSQ HOSP IP/OBS MODERATE 35: CPT

## 2023-12-20 PROCEDURE — 99223 1ST HOSP IP/OBS HIGH 75: CPT

## 2023-12-20 PROCEDURE — 99292 CRITICAL CARE ADDL 30 MIN: CPT

## 2023-12-20 PROCEDURE — 71045 X-RAY EXAM CHEST 1 VIEW: CPT | Mod: 26

## 2023-12-20 PROCEDURE — 99291 CRITICAL CARE FIRST HOUR: CPT

## 2023-12-20 RX ORDER — HEPATITIS B VIRUS VACCINE,RECB 20 MCG/ML
20 VIAL (ML) INTRAMUSCULAR ONCE
Refills: 0 | Status: DISCONTINUED | OUTPATIENT
Start: 2023-12-20 | End: 2023-12-25

## 2023-12-20 RX ADMIN — Medication 50 MILLIGRAM(S): at 21:57

## 2023-12-20 RX ADMIN — INSULIN GLARGINE 12 UNIT(S): 100 INJECTION, SOLUTION SUBCUTANEOUS at 21:58

## 2023-12-20 RX ADMIN — CHLORHEXIDINE GLUCONATE 1 APPLICATION(S): 213 SOLUTION TOPICAL at 21:10

## 2023-12-20 RX ADMIN — Medication 50 MILLIGRAM(S): at 14:02

## 2023-12-20 RX ADMIN — CARVEDILOL PHOSPHATE 25 MILLIGRAM(S): 80 CAPSULE, EXTENDED RELEASE ORAL at 17:06

## 2023-12-20 RX ADMIN — Medication 1: at 17:29

## 2023-12-20 RX ADMIN — ATORVASTATIN CALCIUM 80 MILLIGRAM(S): 80 TABLET, FILM COATED ORAL at 21:57

## 2023-12-20 RX ADMIN — BUDESONIDE AND FORMOTEROL FUMARATE DIHYDRATE 2 PUFF(S): 160; 4.5 AEROSOL RESPIRATORY (INHALATION) at 10:59

## 2023-12-20 RX ADMIN — ISOSORBIDE DINITRATE 30 MILLIGRAM(S): 5 TABLET ORAL at 11:27

## 2023-12-20 RX ADMIN — Medication 1: at 12:15

## 2023-12-20 RX ADMIN — Medication 81 MILLIGRAM(S): at 11:27

## 2023-12-20 RX ADMIN — Medication 9 UNIT(S): at 17:29

## 2023-12-20 RX ADMIN — Medication 9 UNIT(S): at 12:15

## 2023-12-20 RX ADMIN — SENNA PLUS 2 TABLET(S): 8.6 TABLET ORAL at 21:58

## 2023-12-20 RX ADMIN — Medication 2: at 08:00

## 2023-12-20 RX ADMIN — ISOSORBIDE DINITRATE 30 MILLIGRAM(S): 5 TABLET ORAL at 05:42

## 2023-12-20 RX ADMIN — HEPARIN SODIUM 14 UNIT(S)/HR: 5000 INJECTION INTRAVENOUS; SUBCUTANEOUS at 21:57

## 2023-12-20 RX ADMIN — BUDESONIDE AND FORMOTEROL FUMARATE DIHYDRATE 2 PUFF(S): 160; 4.5 AEROSOL RESPIRATORY (INHALATION) at 20:28

## 2023-12-20 RX ADMIN — Medication 9 UNIT(S): at 08:01

## 2023-12-20 RX ADMIN — ISOSORBIDE DINITRATE 30 MILLIGRAM(S): 5 TABLET ORAL at 17:07

## 2023-12-20 RX ADMIN — CARVEDILOL PHOSPHATE 25 MILLIGRAM(S): 80 CAPSULE, EXTENDED RELEASE ORAL at 05:42

## 2023-12-20 RX ADMIN — HEPARIN SODIUM 14 UNIT(S)/HR: 5000 INJECTION INTRAVENOUS; SUBCUTANEOUS at 05:42

## 2023-12-20 RX ADMIN — Medication 0: at 21:58

## 2023-12-20 RX ADMIN — Medication 50 MILLIGRAM(S): at 05:41

## 2023-12-20 NOTE — PROGRESS NOTE ADULT - PROBLEM SELECTOR PLAN 3
- PMVT into VF arrest 11/8, 3 rounds with shocks  -currently off amio  -monitor LFTs  - IABP support as above  - seen by EP

## 2023-12-20 NOTE — PROGRESS NOTE ADULT - SUBJECTIVE AND OBJECTIVE BOX
PATIENT:  DEVORAH VALENCIA  [id number]    CHIEF COMPLAINT:  Patient is a 58y old  Male who presents with a chief complaint of Cardiogenic shock (19 Dec 2023 20:56)      INTERVAL HISTORY/OVERNIGHT EVENTS:  The patient was seen and examined at bedside.     REVIEW OF SYSTEMS:    Constitutional:     [ ] negative [ ] fevers [ ] chills [ ] weight loss [ ] weight gain  HEENT:                  [ ] negative [ ] dry eyes [ ] eye irritation [ ] postnasal drip [ ] nasal congestion  CV:                         [ ] negative  [ ] chest pain [ ] orthopnea [ ] palpitations [ ] murmur  Resp:                     [ ] negative [ ] cough [ ] shortness of breath [ ] dyspnea [ ] wheezing [ ] sputum [ ] hemoptysis  GI:                          [ ] negative [ ] nausea [ ] vomiting [ ] diarrhea [ ] constipation [ ] abd pain [ ] dysphagia   :                        [ ] negative [ ] dysuria [ ] nocturia [ ] hematuria [ ] increased urinary frequency  Musculoskeletal: [ ] negative [ ] back pain [ ] myalgias [ ] arthralgias [ ] fracture  Skin:                       [ ] negative [ ] rash [ ] itch  Neurological:        [ ] negative [ ] headache [ ] dizziness [ ] syncope [ ] weakness [ ] numbness  Psychiatric:           [ ] negative [ ] anxiety [ ] depression  Endocrine:            [ ] negative [ ] diabetes [ ] thyroid problem  Heme/Lymph:      [ ] negative [ ] anemia [ ] bleeding problem  Allergic/Immune: [ ] negative [ ] itchy eyes [ ] nasal discharge [ ] hives [ ] angioedema    [ ] All other systems negative  [ ] Unable to assess ROS because ________.    MEDICATIONS:  MEDICATIONS  (STANDING):  aspirin  chewable 81 milliGRAM(s) Oral daily  atorvastatin 80 milliGRAM(s) Oral at bedtime  bisacodyl 5 milliGRAM(s) Oral every 12 hours  budesonide  80 MICROgram(s)/formoterol 4.5 MICROgram(s) Inhaler 2 Puff(s) Inhalation two times a day  carvedilol 25 milliGRAM(s) Oral every 12 hours  chlorhexidine 2% Cloths 1 Application(s) Topical daily  heparin  Infusion 1300 Unit(s)/Hr (14 mL/Hr) IV Continuous <Continuous>  hydrALAZINE 50 milliGRAM(s) Oral every 8 hours  insulin glargine Injectable (LANTUS) 12 Unit(s) SubCutaneous at bedtime  insulin lispro (ADMELOG) corrective regimen sliding scale   SubCutaneous at bedtime  insulin lispro (ADMELOG) corrective regimen sliding scale   SubCutaneous three times a day before meals  insulin lispro Injectable (ADMELOG) 9 Unit(s) SubCutaneous three times a day before meals  isosorbide   dinitrate Tablet (ISORDIL) 30 milliGRAM(s) Oral three times a day  polyethylene glycol 3350 17 Gram(s) Oral two times a day  senna 2 Tablet(s) Oral at bedtime    MEDICATIONS  (PRN):  hydrOXYzine hydrochloride 25 milliGRAM(s) Oral two times a day PRN Anxiety      ALLERGIES:  Allergies    penicillins (Unknown)    Intolerances        OBJECTIVE:  ICU Vital Signs Last 24 Hrs  T(C): 36.8 (20 Dec 2023 03:00), Max: 36.8 (19 Dec 2023 19:00)  T(F): 98.3 (20 Dec 2023 03:00), Max: 98.3 (19 Dec 2023 23:00)  HR: 76 (20 Dec 2023 06:00) (71 - 85)  BP: --  BP(mean): --  ABP: --  ABP(mean): --  RR: 13 (20 Dec 2023 06:00) (12 - 23)  SpO2: 94% (20 Dec 2023 06:00) (94% - 96%)    O2 Parameters below as of 20 Dec 2023 06:00  Patient On (Oxygen Delivery Method): room air            Adult Advanced Hemodynamics Last 24 Hrs  CVP(mm Hg): --  CVP(cm H2O): --  CO: --  CI: --  PA: --  PA(mean): --  PCWP: --  SVR: --  SVRI: --  PVR: --  PVRI: --  CAPILLARY BLOOD GLUCOSE      POCT Blood Glucose.: 201 mg/dL (20 Dec 2023 07:47)  POCT Blood Glucose.: 118 mg/dL (19 Dec 2023 21:40)  POCT Blood Glucose.: 125 mg/dL (19 Dec 2023 17:11)  POCT Blood Glucose.: 98 mg/dL (19 Dec 2023 11:46)    CAPILLARY BLOOD GLUCOSE      POCT Blood Glucose.: 201 mg/dL (20 Dec 2023 07:47)    I&O's Summary    19 Dec 2023 07:01  -  20 Dec 2023 07:00  --------------------------------------------------------  IN: 1056 mL / OUT: 1000 mL / NET: 56 mL      Daily     Daily     PHYSICAL EXAMINATION:  General: WN/WD NAD  HEENT: PERRLA, EOMI, moist mucous membranes  Neurology: A&Ox3, nonfocal, MOISE x 4  Respiratory: CTA B/L, normal respiratory effort, no wheezes, crackles, rales  CV: RRR, S1S2, no murmurs, rubs or gallops  Abdominal: Soft, NT, ND +BS, Last BM  Extremities: No edema, + peripheral pulses  Incisions:   Tubes:    LABS:                          11.8   8.24  )-----------( 193      ( 19 Dec 2023 01:56 )             35.7     12-19    132<L>  |  104  |  30<H>  ----------------------------<  141<H>  4.1   |  17<L>  |  1.03    Ca    9.7      19 Dec 2023 01:56  Phos  3.6     12-19  Mg     1.8     12-19    TPro  6.7  /  Alb  3.8  /  TBili  0.3  /  DBili  x   /  AST  27  /  ALT  75<H>  /  AlkPhos  60  12-19    LIVER FUNCTIONS - ( 19 Dec 2023 01:56 )  Alb: 3.8 g/dL / Pro: 6.7 g/dL / ALK PHOS: 60 U/L / ALT: 75 U/L / AST: 27 U/L / GGT: x           PT/INR - ( 19 Dec 2023 01:56 )   PT: 12.1 sec;   INR: 1.16 ratio         PTT - ( 19 Dec 2023 01:56 )  PTT:78.9 sec        Urinalysis Basic - ( 19 Dec 2023 01:56 )    Color: x / Appearance: x / SG: x / pH: x  Gluc: 141 mg/dL / Ketone: x  / Bili: x / Urobili: x   Blood: x / Protein: x / Nitrite: x   Leuk Esterase: x / RBC: x / WBC x   Sq Epi: x / Non Sq Epi: x / Bacteria: x        TELEMETRY:     EKG:     IMAGING:      · Assessment	  · Assessment	  58 male with HTN, CAD (s/p PCI 2008), HFrEF, CVA 2018, and T2DM presenting with chest pressure and unknown tachycardia that was shocked x1, [facility] 11/1 found to have in-stent restenosis of pLAD and  of RCA with elevated RA and PA pressures and severely decreased. Admitted to CICU for management of cardiogenic shock and ADHF requiring IABP 11/1 -11/7, with hospital course c/b vfib arrest requiring reinsertion of IABP. Not recommended for ATs per HF. Currently listed for transplant status 2.    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 AT evaluation launched 11/10, he is ABO A. He was discussed during TSC on 11/16 and was approved for listing, however was found to be Bacteremic with 11/17 Blc Cx growing mixed cultures Staph epi and Enterococcus faecalis and started on IV Vanco. Repeat cultures from 11/18 reveal NGTD and therefore was cleared by ID for listing.     He appears adequately supported on IABP at 1:1, electrically quiescent on oral Amiodarone. Cr is improving with holding diuretics. Labs otherwise notable for worsening thrombocytopenia. Awaiting suitable donor. Now with GM + rods in blood culture on 11/30 (reported on 12/4), restarted on vancomycin, and status 7, repeat cultures now negative x48 hours (12/6), now status 2 again.       ====================== NEUROLOGY=====================  Anxiety  - No Seroquel/antipsychotics since vfib arrest and prolonged QTC  - Psych Eval, recommended SSRI, but pt. refused   - Psych recommending atarax PRN  - Continue to monitor mental status    PT/Conditioning  - Continue band exercises while on bedrest s/t IABP    ==================== RESPIRATORY======================  Acute Hypoxemic Respiratory Failure  - s/p x2 intubations for cardiogenic pulm edema and the in setting of cardiac arrest, resolved - extubated 11/10  - Currently maintaining >95% sats on room air  - Continue incentive spirometry and monitoring of sp02    Asthma  - c/w albuterol, symbicort and spiriva  - On trelegy at home  - Continue to monitor SpO2 with goal >94%    ====================CARDIOVASCULAR==================  Vfib arrest i/s/o ischemia  - Lido gtt off 11/13  - PO Amio load - total of 5g per EP complete 11/17  - Amio dc'd 12/5 for rising LFTs  - Keep K > 4, Mag > 2.2     Cardiogenic shock requiring IABP (11/1- 11/7, 11/9-)  - Likely 2/2 NSTEMI and ADHF  - 11/1 LHC: pLAD 100 % in-stent restenosis & mRCA, 100 %. PCWP 30. IABP placed.  - 11/1 TTE: LV dilated. EF 32 %. Regional WMAs present, mod (grade 2) LV diastolic dysfunction  - 11/2 TTE: EF 22% and + LV thrombus  - IABP swapped 11/20 to RFA, continue 1:1 support - switched sensor to R radial a-line 12/13 due to poor sensing on groin line  - Off Milrinone gtt @ 7:30 am 11/11  - [place] d/c'd due to elevated K levels  - c/w coreg 25 BID for GDMT  - UNOS status 2 as of 12/6, pending DCD   - hydralazine 50 TID and ISD 30 TID for AL reduction    NSTEMI iso stent re-occlusion of pLAD and 100%  of RCA  - EKG on admission w/ LBBB  - DAPT: c/w ASA, Brilinta d/c'd per transplant w/u  - c/w lipitor 80  - cMR deferred given necessity of IABP  - CT sx not recommending CABG, undergoing AT eval    LV thrombus  - c/w heparin gtt w/ therapeutic PTT    ===================== RENAL =========================  Non-oliguric ARIC, resolved   - Baseline Cr: 1-1.22  - Renal US: no evidence of renal artery stenosis  - Trend BMP, lytes daily, replace as needed  - Continue Strict I/Os, avoid nephrotoxins    =============== GASTROINTESTINAL===================  Constipation/ileus, resolved  - regular diet  - bowel reg prn    ===================ENDO====================  Type 2 DM  - A1c 8.3   - Continue lantus, premeal, low ISS  - continue FS    ===================HEMATOLOGIC/ONC ===================  - H/H & plts stable  - Monitor H/H and plts  - VTE PPX: heparin gtt    ==================INFECTIOUS DISEASE================  # Positive blood culture, resolved  - Repeat BCx drawn 11/30 for leukocytosis to 12k - positive on 12/4, G+ rods in anaerobic bottle, suspect contaminant  - Repeat cultures x2 sent 12/4 per transplant ID recs - no growth to date  - Vancomycin by trough (12/4-12/6)  - Final microbial ID: Cutibacterium acnes, per ID this is likely to be a skin contaminant, vanc dc'd.   - Dental eval 12/7 to rule out infectious etiology that would have led to bacteremia, no significant findings on exam.     # Enterococcus faecalis bacteremia, resolved  - BCx 11/17+ for enterococcus faecalis x2, Staph epi x1 (likely contaminant)- pan sensitive   - Urine cx 11/18 + enterococcus faecalis  - BCx 11/18 no growth   - IABP site swapped to RFA 11/20  - s/p Vancomycin 1g q12h (11/18-11/27)  - CT A/P negative for infectious pathology    # COVID, resolved  - Off airborne precautions 11/11    # Pre-transplant ID w/u   - Trend ID recs for serologies   - Colonoscopy 11/17 - normal   - Chest CT 11/17 - improved LLL aeration  - s/p immunizations    ==================DERMATOLOGY================  # Upper Extremity Rash  - Patient developed bilateral upper extremity rash after receiving Hepatitis A & B immunizations on 11/24  - Dermatology consulted 12/5 - not likely to be related to vanco  - Recommend clobetasol 0.05% BID to affected areas   - RVP repeated to rule out viral etiology, negative    # ?Left tissue mass  - US soft tissue  -vasc consult?      Lines: IABP (11/20), RRA (12/14) PATIENT:  DEVORAH VALENCIA  [id number]    CHIEF COMPLAINT:  Patient is a 58y old  Male who presents with a chief complaint of Cardiogenic shock (19 Dec 2023 20:56)      INTERVAL HISTORY/OVERNIGHT EVENTS:  The patient was seen and examined at bedside.     REVIEW OF SYSTEMS:    Constitutional:     [ ] negative [ ] fevers [ ] chills [ ] weight loss [ ] weight gain  HEENT:                  [ ] negative [ ] dry eyes [ ] eye irritation [ ] postnasal drip [ ] nasal congestion  CV:                         [ ] negative  [ ] chest pain [ ] orthopnea [ ] palpitations [ ] murmur  Resp:                     [ ] negative [ ] cough [ ] shortness of breath [ ] dyspnea [ ] wheezing [ ] sputum [ ] hemoptysis  GI:                          [ ] negative [ ] nausea [ ] vomiting [ ] diarrhea [ ] constipation [ ] abd pain [ ] dysphagia   :                        [ ] negative [ ] dysuria [ ] nocturia [ ] hematuria [ ] increased urinary frequency  Musculoskeletal: [ ] negative [ ] back pain [ ] myalgias [ ] arthralgias [ ] fracture  Skin:                       [ ] negative [ ] rash [ ] itch  Neurological:        [ ] negative [ ] headache [ ] dizziness [ ] syncope [ ] weakness [ ] numbness  Psychiatric:           [ ] negative [ ] anxiety [ ] depression  Endocrine:            [ ] negative [ ] diabetes [ ] thyroid problem  Heme/Lymph:      [ ] negative [ ] anemia [ ] bleeding problem  Allergic/Immune: [ ] negative [ ] itchy eyes [ ] nasal discharge [ ] hives [ ] angioedema    [ ] All other systems negative  [ ] Unable to assess ROS because ________.    MEDICATIONS:  MEDICATIONS  (STANDING):  aspirin  chewable 81 milliGRAM(s) Oral daily  atorvastatin 80 milliGRAM(s) Oral at bedtime  bisacodyl 5 milliGRAM(s) Oral every 12 hours  budesonide  80 MICROgram(s)/formoterol 4.5 MICROgram(s) Inhaler 2 Puff(s) Inhalation two times a day  carvedilol 25 milliGRAM(s) Oral every 12 hours  chlorhexidine 2% Cloths 1 Application(s) Topical daily  heparin  Infusion 1300 Unit(s)/Hr (14 mL/Hr) IV Continuous <Continuous>  hydrALAZINE 50 milliGRAM(s) Oral every 8 hours  insulin glargine Injectable (LANTUS) 12 Unit(s) SubCutaneous at bedtime  insulin lispro (ADMELOG) corrective regimen sliding scale   SubCutaneous at bedtime  insulin lispro (ADMELOG) corrective regimen sliding scale   SubCutaneous three times a day before meals  insulin lispro Injectable (ADMELOG) 9 Unit(s) SubCutaneous three times a day before meals  isosorbide   dinitrate Tablet (ISORDIL) 30 milliGRAM(s) Oral three times a day  polyethylene glycol 3350 17 Gram(s) Oral two times a day  senna 2 Tablet(s) Oral at bedtime    MEDICATIONS  (PRN):  hydrOXYzine hydrochloride 25 milliGRAM(s) Oral two times a day PRN Anxiety      ALLERGIES:  Allergies    penicillins (Unknown)    Intolerances        OBJECTIVE:  ICU Vital Signs Last 24 Hrs  T(C): 36.8 (20 Dec 2023 03:00), Max: 36.8 (19 Dec 2023 19:00)  T(F): 98.3 (20 Dec 2023 03:00), Max: 98.3 (19 Dec 2023 23:00)  HR: 76 (20 Dec 2023 06:00) (71 - 85)  BP: --  BP(mean): --  ABP: --  ABP(mean): --  RR: 13 (20 Dec 2023 06:00) (12 - 23)  SpO2: 94% (20 Dec 2023 06:00) (94% - 96%)    O2 Parameters below as of 20 Dec 2023 06:00  Patient On (Oxygen Delivery Method): room air            Adult Advanced Hemodynamics Last 24 Hrs  CVP(mm Hg): --  CVP(cm H2O): --  CO: --  CI: --  PA: --  PA(mean): --  PCWP: --  SVR: --  SVRI: --  PVR: --  PVRI: --  CAPILLARY BLOOD GLUCOSE      POCT Blood Glucose.: 201 mg/dL (20 Dec 2023 07:47)  POCT Blood Glucose.: 118 mg/dL (19 Dec 2023 21:40)  POCT Blood Glucose.: 125 mg/dL (19 Dec 2023 17:11)  POCT Blood Glucose.: 98 mg/dL (19 Dec 2023 11:46)    CAPILLARY BLOOD GLUCOSE      POCT Blood Glucose.: 201 mg/dL (20 Dec 2023 07:47)    I&O's Summary    19 Dec 2023 07:01  -  20 Dec 2023 07:00  --------------------------------------------------------  IN: 1056 mL / OUT: 1000 mL / NET: 56 mL      Daily     Daily     PHYSICAL EXAMINATION:  General: WN/WD NAD  HEENT: PERRLA, EOMI, moist mucous membranes  Neurology: A&Ox3, nonfocal, MOISE x 4  Respiratory: CTA B/L, normal respiratory effort, no wheezes, crackles, rales  CV: RRR, S1S2, no murmurs, rubs or gallops  Abdominal: Soft, NT, ND +BS, Last BM  Extremities: No edema, + peripheral pulses  Incisions:   Tubes:    LABS:                          11.8   8.24  )-----------( 193      ( 19 Dec 2023 01:56 )             35.7     12-19    132<L>  |  104  |  30<H>  ----------------------------<  141<H>  4.1   |  17<L>  |  1.03    Ca    9.7      19 Dec 2023 01:56  Phos  3.6     12-19  Mg     1.8     12-19    TPro  6.7  /  Alb  3.8  /  TBili  0.3  /  DBili  x   /  AST  27  /  ALT  75<H>  /  AlkPhos  60  12-19    LIVER FUNCTIONS - ( 19 Dec 2023 01:56 )  Alb: 3.8 g/dL / Pro: 6.7 g/dL / ALK PHOS: 60 U/L / ALT: 75 U/L / AST: 27 U/L / GGT: x           PT/INR - ( 19 Dec 2023 01:56 )   PT: 12.1 sec;   INR: 1.16 ratio         PTT - ( 19 Dec 2023 01:56 )  PTT:78.9 sec        Urinalysis Basic - ( 19 Dec 2023 01:56 )    Color: x / Appearance: x / SG: x / pH: x  Gluc: 141 mg/dL / Ketone: x  / Bili: x / Urobili: x   Blood: x / Protein: x / Nitrite: x   Leuk Esterase: x / RBC: x / WBC x   Sq Epi: x / Non Sq Epi: x / Bacteria: x        TELEMETRY:     EKG:     IMAGING:      · Assessment	  · Assessment	  58 male with HTN, CAD (s/p PCI 2008), HFrEF, CVA 2018, and T2DM presenting with chest pressure and unknown tachycardia that was shocked x1, [facility] 11/1 found to have in-stent restenosis of pLAD and  of RCA with elevated RA and PA pressures and severely decreased. Admitted to CICU for management of cardiogenic shock and ADHF requiring IABP 11/1 -11/7, with hospital course c/b vfib arrest requiring reinsertion of IABP. Not recommended for ATs per HF. Currently listed for transplant status 2.    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 AT evaluation launched 11/10, he is ABO A. He was discussed during TSC on 11/16 and was approved for listing, however was found to be Bacteremic with 11/17 Blc Cx growing mixed cultures Staph epi and Enterococcus faecalis and started on IV Vanco. Repeat cultures from 11/18 reveal NGTD and therefore was cleared by ID for listing.     He appears adequately supported on IABP at 1:1, electrically quiescent on oral Amiodarone. Cr is improving with holding diuretics. Labs otherwise notable for worsening thrombocytopenia. Awaiting suitable donor. Now with GM + rods in blood culture on 11/30 (reported on 12/4), restarted on vancomycin, and status 7, repeat cultures now negative x48 hours (12/6), now status 2 again.       ====================== NEUROLOGY=====================  Anxiety  - No Seroquel/antipsychotics since vfib arrest and prolonged QTC  - Psych Eval, recommended SSRI, but pt. refused   - Psych recommending atarax PRN  - Continue to monitor mental status    PT/Conditioning  - Continue band exercises while on bedrest s/t IABP    ==================== RESPIRATORY======================  Acute Hypoxemic Respiratory Failure  - s/p x2 intubations for cardiogenic pulm edema and the in setting of cardiac arrest, resolved - extubated 11/10  - Currently maintaining >95% sats on room air  - Continue incentive spirometry and monitoring of sp02    Asthma  - c/w albuterol, symbicort and spiriva  - On trelegy at home  - Continue to monitor SpO2 with goal >94%    ====================CARDIOVASCULAR==================  Vfib arrest i/s/o ischemia  - Lido gtt off 11/13  - PO Amio load - total of 5g per EP complete 11/17  - Amio dc'd 12/5 for rising LFTs  - Keep K > 4, Mag > 2.2     Cardiogenic shock requiring IABP (11/1- 11/7, 11/9-)  - Likely 2/2 NSTEMI and ADHF  - 11/1 LHC: pLAD 100 % in-stent restenosis & mRCA, 100 %. PCWP 30. IABP placed.  - 11/1 TTE: LV dilated. EF 32 %. Regional WMAs present, mod (grade 2) LV diastolic dysfunction  - 11/2 TTE: EF 22% and + LV thrombus  - IABP swapped 11/20 to RFA, continue 1:1 support - switched sensor to R radial a-line 12/13 due to poor sensing on groin line  - Off Milrinone gtt @ 7:30 am 11/11  - [place] d/c'd due to elevated K levels  - c/w coreg 25 BID for GDMT  - UNOS status 2 as of 12/6, pending DCD   - hydralazine 50 TID and ISD 30 TID for AL reduction    NSTEMI iso stent re-occlusion of pLAD and 100%  of RCA  - EKG on admission w/ LBBB  - DAPT: c/w ASA, Brilinta d/c'd per transplant w/u  - c/w lipitor 80  - cMR deferred given necessity of IABP  - CT sx not recommending CABG, undergoing AT eval    LV thrombus  - c/w heparin gtt w/ therapeutic PTT    ===================== RENAL =========================  Non-oliguric ARIC, resolved   - Baseline Cr: 1-1.22  - Renal US: no evidence of renal artery stenosis  - Trend BMP, lytes daily, replace as needed  - Continue Strict I/Os, avoid nephrotoxins    =============== GASTROINTESTINAL===================  Constipation/ileus, resolved  - regular diet  - bowel reg prn    ===================ENDO====================  Type 2 DM  - A1c 8.3   - Continue lantus, premeal, low ISS  - continue FS    ===================HEMATOLOGIC/ONC ===================  - H/H & plts stable  - Monitor H/H and plts  - VTE PPX: heparin gtt    ==================INFECTIOUS DISEASE================  # Positive blood culture, resolved  - Repeat BCx drawn 11/30 for leukocytosis to 12k - positive on 12/4, G+ rods in anaerobic bottle, suspect contaminant  - Repeat cultures x2 sent 12/4 per transplant ID recs - no growth to date  - Vancomycin by trough (12/4-12/6)  - Final microbial ID: Cutibacterium acnes, per ID this is likely to be a skin contaminant, vanc dc'd.   - Dental eval 12/7 to rule out infectious etiology that would have led to bacteremia, no significant findings on exam.     # Enterococcus faecalis bacteremia, resolved  - BCx 11/17+ for enterococcus faecalis x2, Staph epi x1 (likely contaminant)- pan sensitive   - Urine cx 11/18 + enterococcus faecalis  - BCx 11/18 no growth   - IABP site swapped to RFA 11/20  - s/p Vancomycin 1g q12h (11/18-11/27)  - CT A/P negative for infectious pathology    # COVID, resolved  - Off airborne precautions 11/11    # Pre-transplant ID w/u   - Trend ID recs for serologies   - Colonoscopy 11/17 - normal   - Chest CT 11/17 - improved LLL aeration  - s/p immunizations    ==================DERMATOLOGY================  # Upper Extremity Rash  - Patient developed bilateral upper extremity rash after receiving Hepatitis A & B immunizations on 11/24  - Dermatology consulted 12/5 - not likely to be related to vanco  - Recommend clobetasol 0.05% BID to affected areas   - RVP repeated to rule out viral etiology, negative    # ?Left tissue mass  - US soft tissue  -vasc consult?      Lines: IABP (11/20), RRA (12/14) PATIENT:  DEVORAH VALENCIA  [id number]    CHIEF COMPLAINT:  Patient is a 58y old  Male who presents with a chief complaint of Cardiogenic shock (19 Dec 2023 20:56)      INTERVAL HISTORY/OVERNIGHT EVENTS:  The patient was seen and examined at bedside.     REVIEW OF SYSTEMS:    Constitutional:     [ ] negative [ ] fevers [ ] chills [ ] weight loss [ ] weight gain  HEENT:                  [ ] negative [ ] dry eyes [ ] eye irritation [ ] postnasal drip [ ] nasal congestion  CV:                         [ ] negative  [ ] chest pain [ ] orthopnea [ ] palpitations [ ] murmur  Resp:                     [ ] negative [ ] cough [ ] shortness of breath [ ] dyspnea [ ] wheezing [ ] sputum [ ] hemoptysis  GI:                          [ ] negative [ ] nausea [ ] vomiting [ ] diarrhea [ ] constipation [ ] abd pain [ ] dysphagia   :                        [ ] negative [ ] dysuria [ ] nocturia [ ] hematuria [ ] increased urinary frequency  Musculoskeletal: [ ] negative [ ] back pain [ ] myalgias [ ] arthralgias [ ] fracture  Skin:                       [ ] negative [ ] rash [ ] itch  Neurological:        [ ] negative [ ] headache [ ] dizziness [ ] syncope [ ] weakness [ ] numbness  Psychiatric:           [ ] negative [ ] anxiety [ ] depression  Endocrine:            [ ] negative [ ] diabetes [ ] thyroid problem  Heme/Lymph:      [ ] negative [ ] anemia [ ] bleeding problem  Allergic/Immune: [ ] negative [ ] itchy eyes [ ] nasal discharge [ ] hives [ ] angioedema    [ ] All other systems negative  [ ] Unable to assess ROS because ________.    MEDICATIONS:  MEDICATIONS  (STANDING):  aspirin  chewable 81 milliGRAM(s) Oral daily  atorvastatin 80 milliGRAM(s) Oral at bedtime  bisacodyl 5 milliGRAM(s) Oral every 12 hours  budesonide  80 MICROgram(s)/formoterol 4.5 MICROgram(s) Inhaler 2 Puff(s) Inhalation two times a day  carvedilol 25 milliGRAM(s) Oral every 12 hours  chlorhexidine 2% Cloths 1 Application(s) Topical daily  heparin  Infusion 1300 Unit(s)/Hr (14 mL/Hr) IV Continuous <Continuous>  hydrALAZINE 50 milliGRAM(s) Oral every 8 hours  insulin glargine Injectable (LANTUS) 12 Unit(s) SubCutaneous at bedtime  insulin lispro (ADMELOG) corrective regimen sliding scale   SubCutaneous at bedtime  insulin lispro (ADMELOG) corrective regimen sliding scale   SubCutaneous three times a day before meals  insulin lispro Injectable (ADMELOG) 9 Unit(s) SubCutaneous three times a day before meals  isosorbide   dinitrate Tablet (ISORDIL) 30 milliGRAM(s) Oral three times a day  polyethylene glycol 3350 17 Gram(s) Oral two times a day  senna 2 Tablet(s) Oral at bedtime    MEDICATIONS  (PRN):  hydrOXYzine hydrochloride 25 milliGRAM(s) Oral two times a day PRN Anxiety      ALLERGIES:  Allergies    penicillins (Unknown)    Intolerances        OBJECTIVE:  ICU Vital Signs Last 24 Hrs  T(C): 36.8 (20 Dec 2023 03:00), Max: 36.8 (19 Dec 2023 19:00)  T(F): 98.3 (20 Dec 2023 03:00), Max: 98.3 (19 Dec 2023 23:00)  HR: 76 (20 Dec 2023 06:00) (71 - 85)  BP: --  BP(mean): --  ABP: --  ABP(mean): --  RR: 13 (20 Dec 2023 06:00) (12 - 23)  SpO2: 94% (20 Dec 2023 06:00) (94% - 96%)    O2 Parameters below as of 20 Dec 2023 06:00  Patient On (Oxygen Delivery Method): room air            Adult Advanced Hemodynamics Last 24 Hrs  CVP(mm Hg): --  CVP(cm H2O): --  CO: --  CI: --  PA: --  PA(mean): --  PCWP: --  SVR: --  SVRI: --  PVR: --  PVRI: --  CAPILLARY BLOOD GLUCOSE      POCT Blood Glucose.: 201 mg/dL (20 Dec 2023 07:47)  POCT Blood Glucose.: 118 mg/dL (19 Dec 2023 21:40)  POCT Blood Glucose.: 125 mg/dL (19 Dec 2023 17:11)  POCT Blood Glucose.: 98 mg/dL (19 Dec 2023 11:46)    CAPILLARY BLOOD GLUCOSE      POCT Blood Glucose.: 201 mg/dL (20 Dec 2023 07:47)    I&O's Summary    19 Dec 2023 07:01  -  20 Dec 2023 07:00  --------------------------------------------------------  IN: 1056 mL / OUT: 1000 mL / NET: 56 mL      Daily     Daily     PHYSICAL EXAMINATION:  General: WN/WD NAD  HEENT: PERRLA, EOMI, moist mucous membranes  Neurology: A&Ox3, nonfocal, MOISE x 4  Respiratory: CTA B/L, normal respiratory effort, no wheezes, crackles, rales  CV: RRR, S1S2, no murmurs, rubs or gallops  Abdominal: Soft, NT, ND +BS, Last BM  Extremities: No edema, + peripheral pulses  Incisions:   Tubes:    LABS:                          11.8   8.24  )-----------( 193      ( 19 Dec 2023 01:56 )             35.7     12-19    132<L>  |  104  |  30<H>  ----------------------------<  141<H>  4.1   |  17<L>  |  1.03    Ca    9.7      19 Dec 2023 01:56  Phos  3.6     12-19  Mg     1.8     12-19    TPro  6.7  /  Alb  3.8  /  TBili  0.3  /  DBili  x   /  AST  27  /  ALT  75<H>  /  AlkPhos  60  12-19    LIVER FUNCTIONS - ( 19 Dec 2023 01:56 )  Alb: 3.8 g/dL / Pro: 6.7 g/dL / ALK PHOS: 60 U/L / ALT: 75 U/L / AST: 27 U/L / GGT: x           PT/INR - ( 19 Dec 2023 01:56 )   PT: 12.1 sec;   INR: 1.16 ratio         PTT - ( 19 Dec 2023 01:56 )  PTT:78.9 sec        Urinalysis Basic - ( 19 Dec 2023 01:56 )    Color: x / Appearance: x / SG: x / pH: x  Gluc: 141 mg/dL / Ketone: x  / Bili: x / Urobili: x   Blood: x / Protein: x / Nitrite: x   Leuk Esterase: x / RBC: x / WBC x   Sq Epi: x / Non Sq Epi: x / Bacteria: x        TELEMETRY:     EKG:     IMAGING: PATIENT:  DEVORAH VALENCIA  [id number]    CHIEF COMPLAINT:  Patient is a 58y old  Male who presents with a chief complaint of Cardiogenic shock (19 Dec 2023 20:56)      INTERVAL HISTORY/OVERNIGHT EVENTS:  The patient was seen and examined at bedside.     REVIEW OF SYSTEMS:    Constitutional:     [ ] negative [ ] fevers [ ] chills [ ] weight loss [ ] weight gain  HEENT:                  [ ] negative [ ] dry eyes [ ] eye irritation [ ] postnasal drip [ ] nasal congestion  CV:                         [ ] negative  [ ] chest pain [ ] orthopnea [ ] palpitations [ ] murmur  Resp:                     [ ] negative [ ] cough [ ] shortness of breath [ ] dyspnea [ ] wheezing [ ] sputum [ ] hemoptysis  GI:                          [ ] negative [ ] nausea [ ] vomiting [ ] diarrhea [ ] constipation [ ] abd pain [ ] dysphagia   :                        [ ] negative [ ] dysuria [ ] nocturia [ ] hematuria [ ] increased urinary frequency  Musculoskeletal: [ ] negative [ ] back pain [ ] myalgias [ ] arthralgias [ ] fracture  Skin:                       [ ] negative [ ] rash [ ] itch  Neurological:        [ ] negative [ ] headache [ ] dizziness [ ] syncope [ ] weakness [ ] numbness  Psychiatric:           [ ] negative [ ] anxiety [ ] depression  Endocrine:            [ ] negative [ ] diabetes [ ] thyroid problem  Heme/Lymph:      [ ] negative [ ] anemia [ ] bleeding problem  Allergic/Immune: [ ] negative [ ] itchy eyes [ ] nasal discharge [ ] hives [ ] angioedema    [ ] All other systems negative  [ ] Unable to assess ROS because ________.    MEDICATIONS:  MEDICATIONS  (STANDING):  aspirin  chewable 81 milliGRAM(s) Oral daily  atorvastatin 80 milliGRAM(s) Oral at bedtime  bisacodyl 5 milliGRAM(s) Oral every 12 hours  budesonide  80 MICROgram(s)/formoterol 4.5 MICROgram(s) Inhaler 2 Puff(s) Inhalation two times a day  carvedilol 25 milliGRAM(s) Oral every 12 hours  chlorhexidine 2% Cloths 1 Application(s) Topical daily  heparin  Infusion 1300 Unit(s)/Hr (14 mL/Hr) IV Continuous <Continuous>  hydrALAZINE 50 milliGRAM(s) Oral every 8 hours  insulin glargine Injectable (LANTUS) 12 Unit(s) SubCutaneous at bedtime  insulin lispro (ADMELOG) corrective regimen sliding scale   SubCutaneous at bedtime  insulin lispro (ADMELOG) corrective regimen sliding scale   SubCutaneous three times a day before meals  insulin lispro Injectable (ADMELOG) 9 Unit(s) SubCutaneous three times a day before meals  isosorbide   dinitrate Tablet (ISORDIL) 30 milliGRAM(s) Oral three times a day  polyethylene glycol 3350 17 Gram(s) Oral two times a day  senna 2 Tablet(s) Oral at bedtime    MEDICATIONS  (PRN):  hydrOXYzine hydrochloride 25 milliGRAM(s) Oral two times a day PRN Anxiety      ALLERGIES:  Allergies    penicillins (Unknown)    Intolerances        OBJECTIVE:  ICU Vital Signs Last 24 Hrs  T(C): 36.8 (20 Dec 2023 03:00), Max: 36.8 (19 Dec 2023 19:00)  T(F): 98.3 (20 Dec 2023 03:00), Max: 98.3 (19 Dec 2023 23:00)  HR: 76 (20 Dec 2023 06:00) (71 - 85)  BP: --  BP(mean): --  ABP: --  ABP(mean): --  RR: 13 (20 Dec 2023 06:00) (12 - 23)  SpO2: 94% (20 Dec 2023 06:00) (94% - 96%)    O2 Parameters below as of 20 Dec 2023 06:00  Patient On (Oxygen Delivery Method): room air            Adult Advanced Hemodynamics Last 24 Hrs  CVP(mm Hg): --  CVP(cm H2O): --  CO: --  CI: --  PA: --  PA(mean): --  PCWP: --  SVR: --  SVRI: --  PVR: --  PVRI: --  CAPILLARY BLOOD GLUCOSE      POCT Blood Glucose.: 201 mg/dL (20 Dec 2023 07:47)  POCT Blood Glucose.: 118 mg/dL (19 Dec 2023 21:40)  POCT Blood Glucose.: 125 mg/dL (19 Dec 2023 17:11)  POCT Blood Glucose.: 98 mg/dL (19 Dec 2023 11:46)    CAPILLARY BLOOD GLUCOSE      POCT Blood Glucose.: 201 mg/dL (20 Dec 2023 07:47)    I&O's Summary    19 Dec 2023 07:01  -  20 Dec 2023 07:00  --------------------------------------------------------  IN: 1056 mL / OUT: 1000 mL / NET: 56 mL      Daily     Daily     PHYSICAL EXAMINATION:  General: WN/WD NAD  HEENT: PERRLA, EOMI, moist mucous membranes  Neurology: A&Ox3, nonfocal, MOISE x 4  Respiratory: CTA B/L, normal respiratory effort, no wheezes, crackles, rales  CV: RRR, S1S2, no murmurs, rubs or gallops  Abdominal: Soft, NT, ND +BS, Last BM  Extremities: No edema, + peripheral pulses  Incisions:   Tubes:    LABS:                          11.8   8.24  )-----------( 193      ( 19 Dec 2023 01:56 )             35.7     12-19    132<L>  |  104  |  30<H>  ----------------------------<  141<H>  4.1   |  17<L>  |  1.03    Ca    9.7      19 Dec 2023 01:56  Phos  3.6     12-19  Mg     1.8     12-19    TPro  6.7  /  Alb  3.8  /  TBili  0.3  /  DBili  x   /  AST  27  /  ALT  75<H>  /  AlkPhos  60  12-19    LIVER FUNCTIONS - ( 19 Dec 2023 01:56 )  Alb: 3.8 g/dL / Pro: 6.7 g/dL / ALK PHOS: 60 U/L / ALT: 75 U/L / AST: 27 U/L / GGT: x           PT/INR - ( 19 Dec 2023 01:56 )   PT: 12.1 sec;   INR: 1.16 ratio         PTT - ( 19 Dec 2023 01:56 )  PTT:78.9 sec        Urinalysis Basic - ( 19 Dec 2023 01:56 )    Color: x / Appearance: x / SG: x / pH: x  Gluc: 141 mg/dL / Ketone: x  / Bili: x / Urobili: x   Blood: x / Protein: x / Nitrite: x   Leuk Esterase: x / RBC: x / WBC x   Sq Epi: x / Non Sq Epi: x / Bacteria: x        TELEMETRY:     EKG:     IMAGING: PATIENT:  DEVORAH VALENCIA  [id number]    CHIEF COMPLAINT:  Patient is a 58y old  Male who presents with a chief complaint of Cardiogenic shock (19 Dec 2023 20:56)      INTERVAL HISTORY/OVERNIGHT EVENTS:  The patient was seen and examined at bedside. No complaints at this time    REVIEW OF SYSTEMS:  GENERAL: No fever or chills  EYES: No change in vision  HEENT: No trouble swallowing or speaking  CARDIAC: No chest pain  PULMONARY: No cough or SOB  GI: No abdominal pain, no nausea or no vomiting, no diarrhea or constipation  : No changes in urination  SKIN: No rashes  NEURO: No headache, no numbness  MSK: No joint pain    MEDICATIONS:  MEDICATIONS  (STANDING):  aspirin  chewable 81 milliGRAM(s) Oral daily  atorvastatin 80 milliGRAM(s) Oral at bedtime  bisacodyl 5 milliGRAM(s) Oral every 12 hours  budesonide  80 MICROgram(s)/formoterol 4.5 MICROgram(s) Inhaler 2 Puff(s) Inhalation two times a day  carvedilol 25 milliGRAM(s) Oral every 12 hours  chlorhexidine 2% Cloths 1 Application(s) Topical daily  heparin  Infusion 1300 Unit(s)/Hr (14 mL/Hr) IV Continuous <Continuous>  hydrALAZINE 50 milliGRAM(s) Oral every 8 hours  insulin glargine Injectable (LANTUS) 12 Unit(s) SubCutaneous at bedtime  insulin lispro (ADMELOG) corrective regimen sliding scale   SubCutaneous at bedtime  insulin lispro (ADMELOG) corrective regimen sliding scale   SubCutaneous three times a day before meals  insulin lispro Injectable (ADMELOG) 9 Unit(s) SubCutaneous three times a day before meals  isosorbide   dinitrate Tablet (ISORDIL) 30 milliGRAM(s) Oral three times a day  polyethylene glycol 3350 17 Gram(s) Oral two times a day  senna 2 Tablet(s) Oral at bedtime    MEDICATIONS  (PRN):  hydrOXYzine hydrochloride 25 milliGRAM(s) Oral two times a day PRN Anxiety      ALLERGIES:  Allergies    penicillins (Unknown)    Intolerances        OBJECTIVE:  ICU Vital Signs Last 24 Hrs  T(C): 36.8 (20 Dec 2023 03:00), Max: 36.8 (19 Dec 2023 19:00)  T(F): 98.3 (20 Dec 2023 03:00), Max: 98.3 (19 Dec 2023 23:00)  HR: 76 (20 Dec 2023 06:00) (71 - 85)  BP: --  BP(mean): --  ABP: --  ABP(mean): --  RR: 13 (20 Dec 2023 06:00) (12 - 23)  SpO2: 94% (20 Dec 2023 06:00) (94% - 96%)    O2 Parameters below as of 20 Dec 2023 06:00  Patient On (Oxygen Delivery Method): room air    POCT Blood Glucose.: 201 mg/dL (20 Dec 2023 07:47)  POCT Blood Glucose.: 118 mg/dL (19 Dec 2023 21:40)  POCT Blood Glucose.: 125 mg/dL (19 Dec 2023 17:11)  POCT Blood Glucose.: 98 mg/dL (19 Dec 2023 11:46)    CAPILLARY BLOOD GLUCOSE      POCT Blood Glucose.: 201 mg/dL (20 Dec 2023 07:47)    I&O's Summary    19 Dec 2023 07:01  -  20 Dec 2023 07:00  --------------------------------------------------------  IN: 1056 mL / OUT: 1000 mL / NET: 56 mL      PHYSICAL EXAMINATION:  GENERAL: No acute distress, well-developed  HEAD:  Atraumatic, Normocephalic  EYES: EOMI, PERRLA, conjunctiva and sclera clear  NECK: Supple, no lymphadenopathy, no JVD  CHEST/LUNG: CTAB; No wheezes, rales, or rhonchi  HEART: Regular rate and rhythm. Normal S1/S2. No murmurs, rubs, or gallops  ABDOMEN: Soft, non-tender, non-distended; normal bowel sounds, no organomegaly  EXTREMITIES:  2+ peripheral pulses b/l, No clubbing, cyanosis, or edema  NEUROLOGY: A&O x 3, no focal deficits  SKIN: No rashes or lesions  Lines: R femoral IABP dsg c/d/i    LABS:                          11.8   8.24  )-----------( 193      ( 19 Dec 2023 01:56 )             35.7     12-19    132<L>  |  104  |  30<H>  ----------------------------<  141<H>  4.1   |  17<L>  |  1.03    Ca    9.7      19 Dec 2023 01:56  Phos  3.6     12-19  Mg     1.8     12-19    TPro  6.7  /  Alb  3.8  /  TBili  0.3  /  DBili  x   /  AST  27  /  ALT  75<H>  /  AlkPhos  60  12-19    LIVER FUNCTIONS - ( 19 Dec 2023 01:56 )  Alb: 3.8 g/dL / Pro: 6.7 g/dL / ALK PHOS: 60 U/L / ALT: 75 U/L / AST: 27 U/L / GGT: x           PT/INR - ( 19 Dec 2023 01:56 )   PT: 12.1 sec;   INR: 1.16 ratio         PTT - ( 19 Dec 2023 01:56 )  PTT:78.9 sec        Urinalysis Basic - ( 19 Dec 2023 01:56 )    Color: x / Appearance: x / SG: x / pH: x  Gluc: 141 mg/dL / Ketone: x  / Bili: x / Urobili: x   Blood: x / Protein: x / Nitrite: x   Leuk Esterase: x / RBC: x / WBC x   Sq Epi: x / Non Sq Epi: x / Bacteria: x        TELEMETRY:     EKG:     IMAGING: PATIENT:  DEVORAH VALENCIA  [id number]    CHIEF COMPLAINT:  Patient is a 58y old  Male who presents with a chief complaint of Cardiogenic shock (19 Dec 2023 20:56)      INTERVAL HISTORY/OVERNIGHT EVENTS:  The patient was seen and examined at bedside. No complaints at this time    REVIEW OF SYSTEMS:  GENERAL: No fever or chills  EYES: No change in vision  HEENT: No trouble swallowing or speaking  CARDIAC: No chest pain  PULMONARY: No cough or SOB  GI: No abdominal pain, no nausea or no vomiting, no diarrhea or constipation  : No changes in urination  SKIN: No rashes  NEURO: No headache, no numbness  MSK: No joint pain    MEDICATIONS:  MEDICATIONS  (STANDING):  aspirin  chewable 81 milliGRAM(s) Oral daily  atorvastatin 80 milliGRAM(s) Oral at bedtime  bisacodyl 5 milliGRAM(s) Oral every 12 hours  budesonide  80 MICROgram(s)/formoterol 4.5 MICROgram(s) Inhaler 2 Puff(s) Inhalation two times a day  carvedilol 25 milliGRAM(s) Oral every 12 hours  chlorhexidine 2% Cloths 1 Application(s) Topical daily  heparin  Infusion 1300 Unit(s)/Hr (14 mL/Hr) IV Continuous <Continuous>  hydrALAZINE 50 milliGRAM(s) Oral every 8 hours  insulin glargine Injectable (LANTUS) 12 Unit(s) SubCutaneous at bedtime  insulin lispro (ADMELOG) corrective regimen sliding scale   SubCutaneous at bedtime  insulin lispro (ADMELOG) corrective regimen sliding scale   SubCutaneous three times a day before meals  insulin lispro Injectable (ADMELOG) 9 Unit(s) SubCutaneous three times a day before meals  isosorbide   dinitrate Tablet (ISORDIL) 30 milliGRAM(s) Oral three times a day  polyethylene glycol 3350 17 Gram(s) Oral two times a day  senna 2 Tablet(s) Oral at bedtime    MEDICATIONS  (PRN):  hydrOXYzine hydrochloride 25 milliGRAM(s) Oral two times a day PRN Anxiety      ALLERGIES:  Allergies    penicillins (Unknown)    Intolerances        OBJECTIVE:  ICU Vital Signs Last 24 Hrs  T(C): 36.8 (20 Dec 2023 03:00), Max: 36.8 (19 Dec 2023 19:00)  T(F): 98.3 (20 Dec 2023 03:00), Max: 98.3 (19 Dec 2023 23:00)  HR: 76 (20 Dec 2023 06:00) (71 - 85)  BP: --  BP(mean): --  ABP: --  ABP(mean): --  RR: 13 (20 Dec 2023 06:00) (12 - 23)  SpO2: 94% (20 Dec 2023 06:00) (94% - 96%)    O2 Parameters below as of 20 Dec 2023 06:00  Patient On (Oxygen Delivery Method): room air    POCT Blood Glucose.: 201 mg/dL (20 Dec 2023 07:47)  POCT Blood Glucose.: 118 mg/dL (19 Dec 2023 21:40)  POCT Blood Glucose.: 125 mg/dL (19 Dec 2023 17:11)  POCT Blood Glucose.: 98 mg/dL (19 Dec 2023 11:46)    CAPILLARY BLOOD GLUCOSE      POCT Blood Glucose.: 201 mg/dL (20 Dec 2023 07:47)    I&O's Summary    19 Dec 2023 07:01  -  20 Dec 2023 07:00  --------------------------------------------------------  IN: 1056 mL / OUT: 1000 mL / NET: 56 mL      PHYSICAL EXAMINATION:  GENERAL: No acute distress, well-developed  HEAD:  Atraumatic, Normocephalic  EYES: EOMI, PERRLA, conjunctiva and sclera clear  NECK: Supple, no lymphadenopathy, no JVD  CHEST/LUNG: CTAB; No wheezes, rales, or rhonchi  HEART: Regular rate and rhythm. Normal S1/S2. No murmurs, rubs, or gallops  ABDOMEN: Soft, non-tender, non-distended; normal bowel sounds, no organomegaly  EXTREMITIES:  2+ peripheral pulses b/l, No clubbing, cyanosis, or edema  NEUROLOGY: A&O x 3, no focal deficits  SKIN: No rashes or lesions  Lines: R femoral IABP dsg c/d/i    LABS:                          11.8   8.24  )-----------( 193      ( 19 Dec 2023 01:56 )             35.7     12-19    132<L>  |  104  |  30<H>  ----------------------------<  141<H>  4.1   |  17<L>  |  1.03    Ca    9.7      19 Dec 2023 01:56  Phos  3.6     12-19  Mg     1.8     12-19    TPro  6.7  /  Alb  3.8  /  TBili  0.3  /  DBili  x   /  AST  27  /  ALT  75<H>  /  AlkPhos  60  12-19    LIVER FUNCTIONS - ( 19 Dec 2023 01:56 )  Alb: 3.8 g/dL / Pro: 6.7 g/dL / ALK PHOS: 60 U/L / ALT: 75 U/L / AST: 27 U/L / GGT: x           PT/INR - ( 19 Dec 2023 01:56 )   PT: 12.1 sec;   INR: 1.16 ratio         PTT - ( 19 Dec 2023 01:56 )  PTT:78.9 sec        Urinalysis Basic - ( 19 Dec 2023 01:56 )    Color: x / Appearance: x / SG: x / pH: x  Gluc: 141 mg/dL / Ketone: x  / Bili: x / Urobili: x   Blood: x / Protein: x / Nitrite: x   Leuk Esterase: x / RBC: x / WBC x   Sq Epi: x / Non Sq Epi: x / Bacteria: x        TELEMETRY:     EKG:     IMAGING: PATIENT:  DEVORAH VALENCIA  [id number]    CHIEF COMPLAINT:  Patient is a 58y old  Male who presents with a chief complaint of Cardiogenic shock (19 Dec 2023 20:56)      INTERVAL HISTORY/OVERNIGHT EVENTS:  The patient was seen and examined at bedside. No complaints at this time    REVIEW OF SYSTEMS:  GENERAL: No fever or chills  EYES: No change in vision  HEENT: No trouble swallowing or speaking  CARDIAC: No chest pain  PULMONARY: No cough or SOB  GI: No abdominal pain, no nausea or no vomiting, no diarrhea or constipation  : No changes in urination  SKIN: No rashes  NEURO: No headache, no numbness  MSK: No joint pain    MEDICATIONS:  MEDICATIONS  (STANDING):  aspirin  chewable 81 milliGRAM(s) Oral daily  atorvastatin 80 milliGRAM(s) Oral at bedtime  bisacodyl 5 milliGRAM(s) Oral every 12 hours  budesonide  80 MICROgram(s)/formoterol 4.5 MICROgram(s) Inhaler 2 Puff(s) Inhalation two times a day  carvedilol 25 milliGRAM(s) Oral every 12 hours  chlorhexidine 2% Cloths 1 Application(s) Topical daily  heparin  Infusion 1300 Unit(s)/Hr (14 mL/Hr) IV Continuous <Continuous>  hydrALAZINE 50 milliGRAM(s) Oral every 8 hours  insulin glargine Injectable (LANTUS) 12 Unit(s) SubCutaneous at bedtime  insulin lispro (ADMELOG) corrective regimen sliding scale   SubCutaneous at bedtime  insulin lispro (ADMELOG) corrective regimen sliding scale   SubCutaneous three times a day before meals  insulin lispro Injectable (ADMELOG) 9 Unit(s) SubCutaneous three times a day before meals  isosorbide   dinitrate Tablet (ISORDIL) 30 milliGRAM(s) Oral three times a day  polyethylene glycol 3350 17 Gram(s) Oral two times a day  senna 2 Tablet(s) Oral at bedtime    MEDICATIONS  (PRN):  hydrOXYzine hydrochloride 25 milliGRAM(s) Oral two times a day PRN Anxiety      ALLERGIES:  Allergies    penicillins (Unknown)    Intolerances        OBJECTIVE:  ICU Vital Signs Last 24 Hrs  T(C): 36.8 (20 Dec 2023 03:00), Max: 36.8 (19 Dec 2023 19:00)  T(F): 98.3 (20 Dec 2023 03:00), Max: 98.3 (19 Dec 2023 23:00)  HR: 76 (20 Dec 2023 06:00) (71 - 85)  BP: --  BP(mean): --  ABP: --  ABP(mean): --  RR: 13 (20 Dec 2023 06:00) (12 - 23)  SpO2: 94% (20 Dec 2023 06:00) (94% - 96%)    O2 Parameters below as of 20 Dec 2023 06:00  Patient On (Oxygen Delivery Method): room air    POCT Blood Glucose.: 201 mg/dL (20 Dec 2023 07:47)  POCT Blood Glucose.: 118 mg/dL (19 Dec 2023 21:40)  POCT Blood Glucose.: 125 mg/dL (19 Dec 2023 17:11)  POCT Blood Glucose.: 98 mg/dL (19 Dec 2023 11:46)    CAPILLARY BLOOD GLUCOSE      POCT Blood Glucose.: 201 mg/dL (20 Dec 2023 07:47)    I&O's Summary    19 Dec 2023 07:01  -  20 Dec 2023 07:00  --------------------------------------------------------  IN: 1056 mL / OUT: 1000 mL / NET: 56 mL      PHYSICAL EXAMINATION:  GENERAL: No acute distress, well-developed  HEAD:  Atraumatic, Normocephalic  EYES: EOMI, PERRLA, conjunctiva and sclera clear  CHEST/LUNG: CTAB; No wheezes, rales, or rhonchi  HEART: Regular rate and rhythm. Normal S1/S2. No murmurs, rubs, or gallops  ABDOMEN: Soft, non-tender, non-distended; normal bowel sounds, no organomegaly  EXTREMITIES:  2+ peripheral pulses b/l, No clubbing, cyanosis, or edema  NEUROLOGY: A&O x 3, no focal deficits  SKIN: No rashes or lesions  Lines: R femoral IABP dsg c/d/i    LABS:                          11.8   8.24  )-----------( 193      ( 19 Dec 2023 01:56 )             35.7     12-19    132<L>  |  104  |  30<H>  ----------------------------<  141<H>  4.1   |  17<L>  |  1.03    Ca    9.7      19 Dec 2023 01:56  Phos  3.6     12-19  Mg     1.8     12-19    TPro  6.7  /  Alb  3.8  /  TBili  0.3  /  DBili  x   /  AST  27  /  ALT  75<H>  /  AlkPhos  60  12-19    LIVER FUNCTIONS - ( 19 Dec 2023 01:56 )  Alb: 3.8 g/dL / Pro: 6.7 g/dL / ALK PHOS: 60 U/L / ALT: 75 U/L / AST: 27 U/L / GGT: x           PT/INR - ( 19 Dec 2023 01:56 )   PT: 12.1 sec;   INR: 1.16 ratio         PTT - ( 19 Dec 2023 01:56 )  PTT:78.9 sec        Urinalysis Basic - ( 19 Dec 2023 01:56 )    Color: x / Appearance: x / SG: x / pH: x  Gluc: 141 mg/dL / Ketone: x  / Bili: x / Urobili: x   Blood: x / Protein: x / Nitrite: x   Leuk Esterase: x / RBC: x / WBC x   Sq Epi: x / Non Sq Epi: x / Bacteria: x        TELEMETRY:     EKG:     IMAGING: PATIENT:  DEVORAH VALENCIA  [id number]    CHIEF COMPLAINT:  Patient is a 58y old  Male who presents with a chief complaint of Cardiogenic shock (19 Dec 2023 20:56)      INTERVAL HISTORY/OVERNIGHT EVENTS:  The patient was seen and examined at bedside. No complaints at this time    REVIEW OF SYSTEMS:  GENERAL: No fever or chills  EYES: No change in vision  HEENT: No trouble swallowing or speaking  CARDIAC: No chest pain  PULMONARY: No cough or SOB  GI: No abdominal pain, no nausea or no vomiting, no diarrhea or constipation  : No changes in urination  SKIN: No rashes  NEURO: No headache, no numbness  MSK: No joint pain    MEDICATIONS:  MEDICATIONS  (STANDING):  aspirin  chewable 81 milliGRAM(s) Oral daily  atorvastatin 80 milliGRAM(s) Oral at bedtime  bisacodyl 5 milliGRAM(s) Oral every 12 hours  budesonide  80 MICROgram(s)/formoterol 4.5 MICROgram(s) Inhaler 2 Puff(s) Inhalation two times a day  carvedilol 25 milliGRAM(s) Oral every 12 hours  chlorhexidine 2% Cloths 1 Application(s) Topical daily  heparin  Infusion 1300 Unit(s)/Hr (14 mL/Hr) IV Continuous <Continuous>  hydrALAZINE 50 milliGRAM(s) Oral every 8 hours  insulin glargine Injectable (LANTUS) 12 Unit(s) SubCutaneous at bedtime  insulin lispro (ADMELOG) corrective regimen sliding scale   SubCutaneous at bedtime  insulin lispro (ADMELOG) corrective regimen sliding scale   SubCutaneous three times a day before meals  insulin lispro Injectable (ADMELOG) 9 Unit(s) SubCutaneous three times a day before meals  isosorbide   dinitrate Tablet (ISORDIL) 30 milliGRAM(s) Oral three times a day  polyethylene glycol 3350 17 Gram(s) Oral two times a day  senna 2 Tablet(s) Oral at bedtime    MEDICATIONS  (PRN):  hydrOXYzine hydrochloride 25 milliGRAM(s) Oral two times a day PRN Anxiety      ALLERGIES:  Allergies    penicillins (Unknown)    Intolerances        OBJECTIVE:  ICU Vital Signs Last 24 Hrs  T(C): 36.8 (20 Dec 2023 03:00), Max: 36.8 (19 Dec 2023 19:00)  T(F): 98.3 (20 Dec 2023 03:00), Max: 98.3 (19 Dec 2023 23:00)  HR: 76 (20 Dec 2023 06:00) (71 - 85)  BP: --  BP(mean): --  ABP: --  ABP(mean): --  RR: 13 (20 Dec 2023 06:00) (12 - 23)  SpO2: 94% (20 Dec 2023 06:00) (94% - 96%)    O2 Parameters below as of 20 Dec 2023 06:00  Patient On (Oxygen Delivery Method): room air    POCT Blood Glucose.: 201 mg/dL (20 Dec 2023 07:47)  POCT Blood Glucose.: 118 mg/dL (19 Dec 2023 21:40)  POCT Blood Glucose.: 125 mg/dL (19 Dec 2023 17:11)  POCT Blood Glucose.: 98 mg/dL (19 Dec 2023 11:46)    CAPILLARY BLOOD GLUCOSE      POCT Blood Glucose.: 201 mg/dL (20 Dec 2023 07:47)    I&O's Summary    19 Dec 2023 07:01  -  20 Dec 2023 07:00  --------------------------------------------------------  IN: 1056 mL / OUT: 1000 mL / NET: 56 mL      PHYSICAL EXAMINATION:  GENERAL: No acute distress, well-developed  HEAD:  Atraumatic, Normocephalic  EYES: EOMI, PERRLA, conjunctiva and sclera clear  CHEST/LUNG: CTAB; No wheezes, rales, or rhonchi  HEART: Regular rate and rhythm. Normal S1/S2. No murmurs, rubs, or gallops  ABDOMEN: Soft, non-tender, non-distended; normal bowel sounds, no organomegaly  EXTREMITIES:  2+ peripheral pulses b/l, No clubbing, cyanosis, or edema  NEUROLOGY: A&O x 3, no focal deficits  SKIN: No rashes or lesions  Lines: R femoral IABP dsg c/d/i    LABS:                          11.8   8.24  )-----------( 193      ( 19 Dec 2023 01:56 )             35.7     12-19    132<L>  |  104  |  30<H>  ----------------------------<  141<H>  4.1   |  17<L>  |  1.03    Ca    9.7      19 Dec 2023 01:56  Phos  3.6     12-19  Mg     1.8     12-19    TPro  6.7  /  Alb  3.8  /  TBili  0.3  /  DBili  x   /  AST  27  /  ALT  75<H>  /  AlkPhos  60  12-19    LIVER FUNCTIONS - ( 19 Dec 2023 01:56 )  Alb: 3.8 g/dL / Pro: 6.7 g/dL / ALK PHOS: 60 U/L / ALT: 75 U/L / AST: 27 U/L / GGT: x           PT/INR - ( 19 Dec 2023 01:56 )   PT: 12.1 sec;   INR: 1.16 ratio         PTT - ( 19 Dec 2023 01:56 )  PTT:78.9 sec        Urinalysis Basic - ( 19 Dec 2023 01:56 )    Color: x / Appearance: x / SG: x / pH: x  Gluc: 141 mg/dL / Ketone: x  / Bili: x / Urobili: x   Blood: x / Protein: x / Nitrite: x   Leuk Esterase: x / RBC: x / WBC x   Sq Epi: x / Non Sq Epi: x / Bacteria: x        TELEMETRY:     EKG:     IMAGING:

## 2023-12-20 NOTE — PROGRESS NOTE ADULT - ATTENDING COMMENTS
57yo M with ischemic HFrEF, ADHF and cardiogenic shock awaiting heart transplant (status 2)  Neuro: no deficits, prn atarax for anxiety  Pulm: sating well on RA, prn albuterol and symbicort  CV: cardiogenic shock on IABP 1:1, cont afterload reduction with coreg, hydral, isordil  Renal: Cr under 1, no issues  GI: DASH diet  Heme: H/H stable, on heparin gtt for LV thrombus and IABP.   ID: no active issues  Endo: BG controlled  neck: US vascular ordered for the mass   Lines: IABP (11/20), RRA (12/14) .     lab holiday today

## 2023-12-20 NOTE — CONSULT NOTE ADULT - SUBJECTIVE AND OBJECTIVE BOX
Vascular Cardiology Consult Note     DIRECT PROVIDER NUMBER: [phone]  / Available on TEAMS    CC: Cardiogenic shock    HPI: 57 y/o M with PMHX HTN, HLD, DM, CAD s/p PCI, HFrEF, History of CVA, admitted for cardiogenic shock requiring IABP, hospital course complicated by Vfib arrest, treated for bacteremia, awaiting heart transplant. US head and neck revealed The palpable abnormality within the lateral left neck corresponds to a  2.0 x 0.7 x 1.4 cm heterogeneous complex solid and cystic appearing lesion in the subcutaneous soft tissues which extends  into the subjacent strap muscle. Vascular Cardiology consulted.     Allergies  penicillins (Unknown)    MEDICATIONS:  aspirin  chewable 81 milliGRAM(s) Oral daily  carvedilol 25 milliGRAM(s) Oral every 12 hours  heparin  Infusion 1300 Unit(s)/Hr IV Continuous <Continuous>  hydrALAZINE 50 milliGRAM(s) Oral every 8 hours  isosorbide   dinitrate Tablet (ISORDIL) 30 milliGRAM(s) Oral three times a day  budesonide  80 MICROgram(s)/formoterol 4.5 MICROgram(s) Inhaler 2 Puff(s) Inhalation two times a day  hydrOXYzine hydrochloride 25 milliGRAM(s) Oral two times a day PRN  bisacodyl 5 milliGRAM(s) Oral every 12 hours  polyethylene glycol 3350 17 Gram(s) Oral two times a day  senna 2 Tablet(s) Oral at bedtime  atorvastatin 80 milliGRAM(s) Oral at bedtime  insulin glargine Injectable (LANTUS) 12 Unit(s) SubCutaneous at bedtime  insulin lispro (ADMELOG) corrective regimen sliding scale   SubCutaneous three times a day before meals  insulin lispro (ADMELOG) corrective regimen sliding scale   SubCutaneous at bedtime  insulin lispro Injectable (ADMELOG) 9 Unit(s) SubCutaneous three times a day before meals  chlorhexidine 2% Cloths 1 Application(s) Topical daily    PAST MEDICAL & SURGICAL HISTORY:  HTN (hypertension)  CAD (coronary artery disease)  2009; stent  Intracranial hemorrhage  2008  Respiratory arrest  Myocardial infarction, unspecified MI type, unspecified artery  History of coronary artery stent placement    FAMILY HISTORY:  Family history of meningitis (Sibling)  Brother, death at age 49 from complications of infection (June 2015)    Family history of hypertension in mother  Mother    SOCIAL HISTORY:  unchanged    REVIEW OF SYSTEMS:  CONSTITUTIONAL: No current fever  EYES: No eye pain  ENT:  No throat pain  NECK: No pain   RESPIRATORY: SOB on admission  CARDIOVASCULAR:  C/P on admission  GASTROINTESTINAL: No abdominal pain  GENITOURINARY: No hematuria  NEUROLOGICAL: No memory loss  SKIN: No LE wounds  LYMPH Nodes: No enlarged glands noted  ENDOCRINE: No heat or cold intolerance noted  MUSCULOSKELETAL: History of LE edema   PSYCHIATRIC: No depression, anxiety  HEME/LYMPH: No  bleeding gums  ALLERGY AND IMMUNOLOGIC: No hives    [ x] All others negative	    PHYSICAL EXAM:  T(C): 36.8 (12-20-23 @ 11:00), Max: 36.8 (12-19-23 @ 19:00)  HR: 85 (12-20-23 @ 14:00) (73 - 85)  RR: 19 (12-20-23 @ 14:00) (12 - 25)  SpO2: 94% (12-20-23 @ 14:00) (94% - 96%)  I&O's Summary    19 Dec 2023 07:01  -  20 Dec 2023 07:00  --------------------------------------------------------  IN: 1056 mL / OUT: 1400 mL / NET: -344 mL    20 Dec 2023 07:01  -  20 Dec 2023 14:38  --------------------------------------------------------  IN: 458 mL / OUT: 300 mL / NET: 158 mL    Appearance: NAD  	  HEENT: Palpable small hematoma to left lateral neck  Cardiovascular: RRR, S1 and S2  Respiratory: CTA BL  Psychiatry:  AAO x 3  Gastrointestinal:  Soft, Non-tender, + BS	  Skin: No cyanosis	  Neurologic: No focal deficit  Extremities: No LE edema, Bilateral calves soft    LABS:	 	    CBC Full  -  ( 19 Dec 2023 01:56 )  WBC Count : 8.24 K/uL  Hemoglobin : 11.8 g/dL  Hematocrit : 35.7 %  Platelet Count - Automated : 193 K/uL  Mean Cell Volume : 91.1 fl  Mean Cell Hemoglobin : 30.1 pg  Mean Cell Hemoglobin Concentration : 33.1 gm/dL  Auto Neutrophil # : 5.25 K/uL  Auto Lymphocyte # : 1.65 K/uL  Auto Monocyte # : 0.60 K/uL  Auto Eosinophil # : 0.63 K/uL  Auto Basophil # : 0.06 K/uL  Auto Neutrophil % : 63.8 %  Auto Lymphocyte % : 20.0 %  Auto Monocyte % : 7.3 %  Auto Eosinophil % : 7.6 %  Auto Basophil % : 0.7 %    12-19    132<L>  |  104  |  30<H>  ----------------------------<  141<H>  4.1   |  17<L>  |  1.03    Ca    9.7      19 Dec 2023 01:56  Phos  3.6     12-19  Mg     1.8     12-19    TPro  6.7  /  Alb  3.8  /  TBili  0.3  /  DBili  x   /  AST  27  /  ALT  75<H>  /  AlkPhos  60  12-19      Assessment:  1. L small hematoma appreciated to L neck  2. Cardiogenic Shock  3. History of cardiac arrest  4. HFrEF  5. CAD s/p PCI  6. HTN  7. HLD  8. DM     Plan:  1. Recommend left upper extremity venous and arterial duplex (with focus on the left neck area).  2. Patient reports the small L neck hematoma is improving, continue to monitor.   3. Appreciate HF and CICU care. Discussed with Dr. Matt and Dr. Espana.       Thank you  KARYN Salazar, MS, [facility]  Vascular Cardiology Service    Please call with any questions:   DIRECT SERVICE NUMBER: [phone] / Available on TEAMS Vascular Cardiology Consult Note     DIRECT PROVIDER NUMBER: [phone]  / Available on TEAMS    CC: Cardiogenic shock    HPI: 59 y/o M with PMHX HTN, HLD, DM, CAD s/p PCI, HFrEF, History of CVA, admitted for cardiogenic shock requiring IABP, hospital course complicated by Vfib arrest, treated for bacteremia, awaiting heart transplant. US head and neck revealed The palpable abnormality within the lateral left neck corresponds to a  2.0 x 0.7 x 1.4 cm heterogeneous complex solid and cystic appearing lesion in the subcutaneous soft tissues which extends  into the subjacent strap muscle. Vascular Cardiology consulted.     Allergies  penicillins (Unknown)    MEDICATIONS:  aspirin  chewable 81 milliGRAM(s) Oral daily  carvedilol 25 milliGRAM(s) Oral every 12 hours  heparin  Infusion 1300 Unit(s)/Hr IV Continuous <Continuous>  hydrALAZINE 50 milliGRAM(s) Oral every 8 hours  isosorbide   dinitrate Tablet (ISORDIL) 30 milliGRAM(s) Oral three times a day  budesonide  80 MICROgram(s)/formoterol 4.5 MICROgram(s) Inhaler 2 Puff(s) Inhalation two times a day  hydrOXYzine hydrochloride 25 milliGRAM(s) Oral two times a day PRN  bisacodyl 5 milliGRAM(s) Oral every 12 hours  polyethylene glycol 3350 17 Gram(s) Oral two times a day  senna 2 Tablet(s) Oral at bedtime  atorvastatin 80 milliGRAM(s) Oral at bedtime  insulin glargine Injectable (LANTUS) 12 Unit(s) SubCutaneous at bedtime  insulin lispro (ADMELOG) corrective regimen sliding scale   SubCutaneous three times a day before meals  insulin lispro (ADMELOG) corrective regimen sliding scale   SubCutaneous at bedtime  insulin lispro Injectable (ADMELOG) 9 Unit(s) SubCutaneous three times a day before meals  chlorhexidine 2% Cloths 1 Application(s) Topical daily    PAST MEDICAL & SURGICAL HISTORY:  HTN (hypertension)  CAD (coronary artery disease)  2009; stent  Intracranial hemorrhage  2008  Respiratory arrest  Myocardial infarction, unspecified MI type, unspecified artery  History of coronary artery stent placement    FAMILY HISTORY:  Family history of meningitis (Sibling)  Brother, death at age 49 from complications of infection (June 2015)    Family history of hypertension in mother  Mother    SOCIAL HISTORY:  unchanged    REVIEW OF SYSTEMS:  CONSTITUTIONAL: No current fever  EYES: No eye pain  ENT:  No throat pain  NECK: No pain   RESPIRATORY: SOB on admission  CARDIOVASCULAR:  C/P on admission  GASTROINTESTINAL: No abdominal pain  GENITOURINARY: No hematuria  NEUROLOGICAL: No memory loss  SKIN: No LE wounds  LYMPH Nodes: No enlarged glands noted  ENDOCRINE: No heat or cold intolerance noted  MUSCULOSKELETAL: History of LE edema   PSYCHIATRIC: No depression, anxiety  HEME/LYMPH: No  bleeding gums  ALLERGY AND IMMUNOLOGIC: No hives    [ x] All others negative	    PHYSICAL EXAM:  T(C): 36.8 (12-20-23 @ 11:00), Max: 36.8 (12-19-23 @ 19:00)  HR: 85 (12-20-23 @ 14:00) (73 - 85)  RR: 19 (12-20-23 @ 14:00) (12 - 25)  SpO2: 94% (12-20-23 @ 14:00) (94% - 96%)  I&O's Summary    19 Dec 2023 07:01  -  20 Dec 2023 07:00  --------------------------------------------------------  IN: 1056 mL / OUT: 1400 mL / NET: -344 mL    20 Dec 2023 07:01  -  20 Dec 2023 14:38  --------------------------------------------------------  IN: 458 mL / OUT: 300 mL / NET: 158 mL    Appearance: NAD  	  HEENT: Palpable small hematoma to left lateral neck  Cardiovascular: RRR, S1 and S2  Respiratory: CTA BL  Psychiatry:  AAO x 3  Gastrointestinal:  Soft, Non-tender, + BS	  Skin: No cyanosis	  Neurologic: No focal deficit  Extremities: No LE edema, Bilateral calves soft    LABS:	 	    CBC Full  -  ( 19 Dec 2023 01:56 )  WBC Count : 8.24 K/uL  Hemoglobin : 11.8 g/dL  Hematocrit : 35.7 %  Platelet Count - Automated : 193 K/uL  Mean Cell Volume : 91.1 fl  Mean Cell Hemoglobin : 30.1 pg  Mean Cell Hemoglobin Concentration : 33.1 gm/dL  Auto Neutrophil # : 5.25 K/uL  Auto Lymphocyte # : 1.65 K/uL  Auto Monocyte # : 0.60 K/uL  Auto Eosinophil # : 0.63 K/uL  Auto Basophil # : 0.06 K/uL  Auto Neutrophil % : 63.8 %  Auto Lymphocyte % : 20.0 %  Auto Monocyte % : 7.3 %  Auto Eosinophil % : 7.6 %  Auto Basophil % : 0.7 %    12-19    132<L>  |  104  |  30<H>  ----------------------------<  141<H>  4.1   |  17<L>  |  1.03    Ca    9.7      19 Dec 2023 01:56  Phos  3.6     12-19  Mg     1.8     12-19    TPro  6.7  /  Alb  3.8  /  TBili  0.3  /  DBili  x   /  AST  27  /  ALT  75<H>  /  AlkPhos  60  12-19      Assessment:  1. L small hematoma appreciated to L neck  2. Cardiogenic Shock  3. History of cardiac arrest  4. HFrEF  5. CAD s/p PCI  6. HTN  7. HLD  8. DM     Plan:  1. Recommend left upper extremity venous and arterial duplex (with focus on the left neck area).  2. Patient reports the small L neck hematoma is improving, continue to monitor.   3. Appreciate HF and CICU care. Discussed with Dr. Matt and Dr. Espana.       Thank you  KARYN Salazar, MS, [facility]  Vascular Cardiology Service    Please call with any questions:   DIRECT SERVICE NUMBER: [phone] / Available on TEAMS

## 2023-12-20 NOTE — CHART NOTE - NSCHARTNOTEFT_GEN_A_CORE
Mr. Hardy's transplant cardiology team (Dr. Richardson and Dr. Espana) reached out to me earlier today because he has a potential donor heart from a donor that is HBV viremia (with HBsAg+ and HBV TIM+). Mr. Hardy has received one dose of Heplisav-B earlier this admission but is non-immune based on low HBsAb titer drawn today.    I had a lengthy bedside discussion with Mr. Hardy about the potential for donor-derived HBV infection were he to accept this offer, including that HBV viremia can lead to liver failure, cirrhosis, and liver cancer as well as less common extrahepatic manifestations and risks. However, we have effective treatment strategies to mitigate his risk of any hepatitis B-related complications by giving him HBIG intraoperatively and then treating him with antiviral therapy post-transplant. This combination treatment would greatly reduce the likelihood of his becoming HBV viremic and should effectively suppress viremia even were it to occur. He may need to remain on antiviral therapy lifelong if he were to have any post-transplant HBV viremia. Otherwise, if he never becomes viremic and serologic testing 1 year post-transplant shows HBsAg negativity, HBcAb negativity, and development of HBsAb (such as from the HBIG and follow-up vaccination), antiviral therapy may be able to be stopped at 1 year post-transplant. We discussed that, given the efficacy of HBIG and antiviral therapy, I would feel very comfortable accepting a HBV viremic heart such as this were he my own family member. However, as with any organ offer, he needs to make his own decision weighing the risks as I described to him and balanced against his risk of wait-list mortality while awaiting other organ offers.    His emergency contact and physician friend Dr. Martínez participated in a portion of the discussion and voiced his own support for accepting the organ offer but also of course deferred to the patient himself as the one who ultimately needs to feel comfortable with the decision.    After our decision and upon learning that his HBsAb titer resulted and he is not yet immune, Mr. Hardy said he wanted to "still think about it" for another 15 minutes and then have his transplant cardiology team reach out to him for a final decision on this offer. I communicated all of this to Dr. Richardson and Dr. Espana as well as cardiothoracic surgeon Dr. Baltazar Bhatti.    Please don't hesitate to call with any questions or concerns.    Britta Pascual M.D., Ph.D.  Transplant Hepatology

## 2023-12-20 NOTE — PROGRESS NOTE ADULT - ASSESSMENT
· Assessment	  · Assessment	  58 male with HTN, CAD (s/p PCI 2008), HFrEF, CVA 2018, and T2DM presenting with chest pressure and unknown tachycardia that was shocked x1, C 11/1 found to have in-stent restenosis of pLAD and  of RCA with elevated RA and PA pressures and severely decreased. Admitted to CICU for management of cardiogenic shock and ADHF requiring IABP 11/1 -11/7, with hospital course c/b vfib arrest requiring reinsertion of IABP. Not recommended for ATs per HF. Currently listed for transplant status 2.    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 AT evaluation launched 11/10, he is ABO A. He was discussed during TSC on 11/16 and was approved for listing, however was found to be Bacteremic with 11/17 Blc Cx growing mixed cultures Staph epi and Enterococcus faecalis and started on IV Vanco. Repeat cultures from 11/18 reveal NGTD and therefore was cleared by ID for listing.     He appears adequately supported on IABP at 1:1, electrically quiescent on oral Amiodarone. Cr is improving with holding diuretics. Labs otherwise notable for worsening thrombocytopenia. Awaiting suitable donor. Now with GM + rods in blood culture on 11/30 (reported on 12/4), restarted on vancomycin, and status 7, repeat cultures now negative x48 hours (12/6), now status 2 again.       ====================== NEUROLOGY=====================  Anxiety  - No Seroquel/antipsychotics since vfib arrest and prolonged QTC  - Psych Eval, recommended SSRI, but pt. refused   - Psych recommending atarax PRN  - Continue to monitor mental status    PT/Conditioning  - Continue band exercises while on bedrest s/t IABP    ==================== RESPIRATORY======================  Acute Hypoxemic Respiratory Failure  - s/p x2 intubations for cardiogenic pulm edema and the in setting of cardiac arrest, resolved - extubated 11/10  - Currently maintaining >95% sats on room air  - Continue incentive spirometry and monitoring of sp02    Asthma  - c/w albuterol, symbicort and spiriva  - On trelegy at home  - Continue to monitor SpO2 with goal >94%    ====================CARDIOVASCULAR==================  Vfib arrest i/s/o ischemia  - Lido gtt off 11/13  - PO Amio load - total of 5g per EP complete 11/17  - Amio dc'd 12/5 for rising LFTs  - Keep K > 4, Mag > 2.2     Cardiogenic shock requiring IABP (11/1- 11/7, 11/9-)  - Likely 2/2 NSTEMI and ADHF  - 11/1 LHC: pLAD 100 % in-stent restenosis & mRCA, 100 %. PCWP 30. IABP placed.  - 11/1 TTE: LV dilated. EF 32 %. Regional WMAs present, mod (grade 2) LV diastolic dysfunction  - 11/2 TTE: EF 22% and + LV thrombus  - IABP swapped 11/20 to RFA, continue 1:1 support - switched sensor to R radial a-line 12/13 due to poor sensing on groin line  - Off Milrinone gtt @ 7:30 am 11/11  - [place] d/c'd due to elevated K levels  - c/w coreg 25 BID for GDMT  - UNOS status 2 as of 12/6, pending DCD   - hydralazine 50 TID and ISD 30 TID for AL reduction    NSTEMI iso stent re-occlusion of pLAD and 100%  of RCA  - EKG on admission w/ LBBB  - DAPT: c/w ASA, Brilinta d/c'd per transplant w/u  - c/w lipitor 80  - cMR deferred given necessity of IABP  - CT sx not recommending CABG, undergoing AT eval    LV thrombus  - c/w heparin gtt w/ therapeutic PTT    ===================== RENAL =========================  Non-oliguric ARIC, resolved   - Baseline Cr: 1-1.22  - Renal US: no evidence of renal artery stenosis  - Trend BMP, lytes daily, replace as needed  - Continue Strict I/Os, avoid nephrotoxins    =============== GASTROINTESTINAL===================  Constipation/ileus, resolved  - regular diet  - bowel reg prn    ===================ENDO====================  Type 2 DM  - A1c 8.3   - Continue lantus, premeal, low ISS  - continue FS    ===================HEMATOLOGIC/ONC ===================  - H/H & plts stable  - Monitor H/H and plts  - VTE PPX: heparin gtt    ==================INFECTIOUS DISEASE================  # Positive blood culture, resolved  - Repeat BCx drawn 11/30 for leukocytosis to 12k - positive on 12/4, G+ rods in anaerobic bottle, suspect contaminant  - Repeat cultures x2 sent 12/4 per transplant ID recs - no growth to date  - Vancomycin by trough (12/4-12/6)  - Final microbial ID: Cutibacterium acnes, per ID this is likely to be a skin contaminant, vanc dc'd.   - Dental eval 12/7 to rule out infectious etiology that would have led to bacteremia, no significant findings on exam.     # Enterococcus faecalis bacteremia, resolved  - BCx 11/17+ for enterococcus faecalis x2, Staph epi x1 (likely contaminant)- pan sensitive   - Urine cx 11/18 + enterococcus faecalis  - BCx 11/18 no growth   - IABP site swapped to RFA 11/20  - s/p Vancomycin 1g q12h (11/18-11/27)  - CT A/P negative for infectious pathology    # COVID, resolved  - Off airborne precautions 11/11    # Pre-transplant ID w/u   - Trend ID recs for serologies   - Colonoscopy 11/17 - normal   - Chest CT 11/17 - improved LLL aeration  - s/p immunizations    ==================DERMATOLOGY================  # Upper Extremity Rash  - Patient developed bilateral upper extremity rash after receiving Hepatitis A & B immunizations on 11/24  - Dermatology consulted 12/5 - not likely to be related to vanco  - Recommend clobetasol 0.05% BID to affected areas   - RVP repeated to rule out viral etiology, negative    # ?Left tissue mass  - US soft tissue:  palpable abnormality within the lateral left neck corresponds to a   2.0 x 0.7 x 1.4 cm heterogeneous complex solid and cystic appearing   lesion in the subcutaneous soft tissues which extends  into the subjacent   strap muscle  -vasc consult?      Lines: IABP (11/20), RRA (12/14) · Assessment	  58 male with HTN, CAD (s/p PCI 2008), HFrEF, CVA 2018, and T2DM presenting with chest pressure and unknown tachycardia that was shocked x1, C 11/1 found to have in-stent restenosis of pLAD and  of RCA with elevated RA and PA pressures and severely decreased. Admitted to CICU for management of cardiogenic shock and ADHF requiring IABP 11/1 -11/7, with hospital course c/b vfib arrest requiring reinsertion of IABP. Not recommended for ATs per HF. Currently listed for transplant status 2.    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 AT evaluation launched 11/10, he is ABO A. He was discussed during TSC on 11/16 and was approved for listing, however was found to be Bacteremic with 11/17 Blc Cx growing mixed cultures Staph epi and Enterococcus faecalis and started on IV Vanco. Repeat cultures from 11/18 reveal NGTD and therefore was cleared by ID for listing.     He appears adequately supported on IABP at 1:1, electrically quiescent on oral Amiodarone. Cr is improving with holding diuretics. Labs otherwise notable for worsening thrombocytopenia. Awaiting suitable donor. Now with GM + rods in blood culture on 11/30 (reported on 12/4), restarted on vancomycin, and status 7, repeat cultures now negative x48 hours (12/6), now status 2 again.       ====================== NEUROLOGY=====================  Anxiety  - No Seroquel/antipsychotics since vfib arrest and prolonged QTC  - Psych Eval, recommended SSRI, but pt. refused   - Psych recommending atarax PRN  - Continue to monitor mental status    PT/Conditioning  - Continue band exercises while on bedrest s/t IABP    ==================== RESPIRATORY======================  Acute Hypoxemic Respiratory Failure  - s/p x2 intubations for cardiogenic pulm edema and the in setting of cardiac arrest, resolved - extubated 11/10  - Currently maintaining >95% sats on room air  - Continue incentive spirometry and monitoring of sp02    Asthma  - c/w symbicort  - On trelegy at home  - Continue to monitor SpO2 with goal >94%    ====================CARDIOVASCULAR==================  Vfib arrest i/s/o ischemia  - Lido gtt off 11/13  - PO Amio load - total of 5g per EP complete 11/17  - Amio dc'd 12/5 for rising LFTs  - Keep K > 4, Mag > 2.2     Cardiogenic shock requiring IABP (11/1- 11/7, 11/9-)  - Likely 2/2 NSTEMI and ADHF  - 11/1 LHC: pLAD 100 % in-stent restenosis & mRCA, 100 %. PCWP 30. IABP placed.  - 11/1 TTE: LV dilated. EF 32 %. Regional WMAs present, mod (grade 2) LV diastolic dysfunction  - 11/2 TTE: EF 22% and + LV thrombus  - IABP swapped 11/20 to RFA, continue 1:1 support - switched sensor to R radial a-line 12/13 due to poor sensing on groin line  - Off Milrinone gtt @ 7:30 am 11/11  - [place] d/c'd due to elevated K levels  - c/w coreg 25 BID for GDMT  - UNOS status 2 as of 12/6  - hydralazine 50 TID and ISD 30 TID for AL reduction    NSTEMI iso stent re-occlusion of pLAD and 100%  of RCA  - EKG on admission w/ LBBB  - DAPT: c/w ASA, Brilinta d/c'd per transplant w/u  - c/w lipitor 80  - cMR deferred given necessity of IABP  - CT sx not recommending CABG, undergoing AT eval    LV thrombus  - c/w heparin gtt w/ therapeutic PTT    ===================== RENAL =========================  Non-oliguric ARIC, resolved   - Baseline Cr: 1-1.22  - Renal US: no evidence of renal artery stenosis  - Trend BMP, lytes daily, replace as needed  - Continue Strict I/Os, avoid nephrotoxins    =============== GASTROINTESTINAL===================  Constipation/ileus, resolved  - regular diet  - bowel reg prn    ===================ENDO====================  Type 2 DM  - A1c 8.3   - Continue lantus, premeal, low ISS  - continue FS    ===================HEMATOLOGIC/ONC ===================  - H/H & plts stable  - Monitor H/H and plts  - VTE PPX: heparin gtt    ==================INFECTIOUS DISEASE================  # Positive blood culture, resolved  - Repeat BCx drawn 11/30 for leukocytosis to 12k - positive on 12/4, G+ rods in anaerobic bottle, suspect contaminant  - Repeat cultures x2 sent 12/4 per transplant ID recs - no growth to date  - Vancomycin by trough (12/4-12/6)  - Final microbial ID: Cutibacterium acnes, per ID this is likely to be a skin contaminant, vanc dc'd.   - Dental eval 12/7 to rule out infectious etiology that would have led to bacteremia, no significant findings on exam.     # Enterococcus faecalis bacteremia, resolved  - BCx 11/17+ for enterococcus faecalis x2, Staph epi x1 (likely contaminant)- pan sensitive   - Urine cx 11/18 + enterococcus faecalis  - BCx 11/18 no growth   - IABP site swapped to RFA 11/20  - s/p Vancomycin 1g q12h (11/18-11/27)  - CT A/P negative for infectious pathology    # COVID, resolved  - Off airborne precautions 11/11    # Pre-transplant ID w/u   - Trend ID recs for serologies   - Colonoscopy 11/17 - normal   - Chest CT 11/17 - improved LLL aeration  - s/p immunizations    ==================DERMATOLOGY================  # Upper Extremity Rash  - Patient developed bilateral upper extremity rash after receiving Hepatitis A & B immunizations on 11/24  - Dermatology consulted 12/5 - not likely to be related to vanco  - Recommend clobetasol 0.05% BID to affected areas   - RVP repeated to rule out viral etiology, negative    # Left tissue mass  - US soft tissue:  lateral left neck 2.0 x 0.7 x 1.4 cm heterogeneous complex solid and cystic appearing lesion in subcutaneous soft tissue extending into subjacent strap muscle  - vasc consult: JOSE J venous duplex US ordered    Lines: IABP (11/20), RRA (12/14) · Assessment	  58 male with HTN, CAD (s/p PCI 2008), HFrEF, CVA 2018, and T2DM presenting with chest pressure and unknown tachycardia that was shocked x1, C 11/1 found to have in-stent restenosis of pLAD and  of RCA with elevated RA and PA pressures and severely decreased. Admitted to CICU for management of cardiogenic shock and ADHF requiring IABP 11/1 -11/7, with hospital course c/b vfib arrest requiring reinsertion of IABP. Not recommended for ATs per HF. Currently listed for transplant status 2.    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 AT evaluation launched 11/10, he is ABO A. He was discussed during TSC on 11/16 and was approved for listing, however was found to be Bacteremic with 11/17 Blc Cx growing mixed cultures Staph epi and Enterococcus faecalis and started on IV Vanco. Repeat cultures from 11/18 reveal NGTD and therefore was cleared by ID for listing.     He appears adequately supported on IABP at 1:1, electrically quiescent on oral Amiodarone. Cr is improving with holding diuretics. Labs otherwise notable for worsening thrombocytopenia. Awaiting suitable donor. Now with GM + rods in blood culture on 11/30 (reported on 12/4), restarted on vancomycin, and status 7, repeat cultures now negative x48 hours (12/6), now status 2 again.       ====================== NEUROLOGY=====================  Anxiety  - No Seroquel/antipsychotics since vfib arrest and prolonged QTC  - Psych Eval, recommended SSRI, but pt. refused   - Psych recommending atarax PRN  - Continue to monitor mental status    PT/Conditioning  - Continue band exercises while on bedrest s/t IABP    ==================== RESPIRATORY======================  Acute Hypoxemic Respiratory Failure  - s/p x2 intubations for cardiogenic pulm edema and the in setting of cardiac arrest, resolved - extubated 11/10  - Currently maintaining >95% sats on room air  - Continue incentive spirometry and monitoring of sp02    Asthma  - c/w symbicort  - On trelegy at home  - Continue to monitor SpO2 with goal >94%    ====================CARDIOVASCULAR==================  Vfib arrest i/s/o ischemia  - Lido gtt off 11/13  - PO Amio load - total of 5g per EP complete 11/17  - Amio dc'd 12/5 for rising LFTs  - Keep K > 4, Mag > 2.2     Cardiogenic shock requiring IABP (11/1- 11/7, 11/9-)  - Likely 2/2 NSTEMI and ADHF  - 11/1 LHC: pLAD 100 % in-stent restenosis & mRCA, 100 %. PCWP 30. IABP placed.  - 11/1 TTE: LV dilated. EF 32 %. Regional WMAs present, mod (grade 2) LV diastolic dysfunction  - 11/2 TTE: EF 22% and + LV thrombus  - IABP swapped 11/20 to RFA, continue 1:1 support - switched sensor to R radial a-line 12/13 due to poor sensing on groin line  - Off Milrinone gtt @ 7:30 am 11/11  - [place] d/c'd due to elevated K levels  - c/w coreg 25 BID for GDMT  - UNOS status 2 as of 12/6  - hydralazine 50 TID and ISD 30 TID for AL reduction    NSTEMI iso stent re-occlusion of pLAD and 100%  of RCA  - EKG on admission w/ LBBB  - DAPT: c/w ASA, Brilinta d/c'd per transplant w/u  - c/w lipitor 80  - cMR deferred given necessity of IABP  - CT sx not recommending CABG, undergoing AT eval    LV thrombus  - c/w heparin gtt w/ therapeutic PTT    ===================== RENAL =========================  Non-oliguric ARIC, resolved   - Baseline Cr: 1-1.22  - Renal US: no evidence of renal artery stenosis  - Trend BMP, lytes daily, replace as needed  - Continue Strict I/Os, avoid nephrotoxins    =============== GASTROINTESTINAL===================  Constipation/ileus, resolved  - regular diet  - bowel reg prn    ===================ENDO====================  Type 2 DM  - A1c 8.3   - Continue lantus, premeal, low ISS  - continue FS    ===================HEMATOLOGIC/ONC ===================  - H/H & plts stable  - Monitor H/H and plts  - VTE PPX: heparin gtt    ==================INFECTIOUS DISEASE================  # Positive blood culture, resolved  - Repeat BCx drawn 11/30 for leukocytosis to 12k - positive on 12/4, G+ rods in anaerobic bottle, suspect contaminant  - Repeat cultures x2 sent 12/4 per transplant ID recs - no growth to date  - Vancomycin by trough (12/4-12/6)  - Final microbial ID: Cutibacterium acnes, per ID this is likely to be a skin contaminant, vanc dc'd.   - Dental eval 12/7 to rule out infectious etiology that would have led to bacteremia, no significant findings on exam.     # Enterococcus faecalis bacteremia, resolved  - BCx 11/17+ for enterococcus faecalis x2, Staph epi x1 (likely contaminant)- pan sensitive   - Urine cx 11/18 + enterococcus faecalis  - BCx 11/18 no growth   - IABP site swapped to RFA 11/20  - s/p Vancomycin 1g q12h (11/18-11/27)  - CT A/P negative for infectious pathology    # COVID, resolved  - Off airborne precautions 11/11    # Pre-transplant ID w/u   - Trend ID recs for serologies   - Colonoscopy 11/17 - normal   - Chest CT 11/17 - improved LLL aeration  - s/p immunizations    ==================DERMATOLOGY================  # Upper Extremity Rash  - Patient developed bilateral upper extremity rash after receiving Hepatitis A & B immunizations on 11/24  - Dermatology consulted 12/5 - not likely to be related to vanco  - Recommend clobetasol 0.05% BID to affected areas   - RVP repeated to rule out viral etiology, negative    # Left tissue mass  - US soft tissue:  lateral left neck 2.0 x 0.7 x 1.4 cm heterogeneous complex solid and cystic appearing lesion in subcutaneous soft tissue extending into subjacent strap muscle  - vasc consult: JOSE J venous duplex US ordered    Lines: IABP (11/20), RRA (12/14) · Assessment	  58 male with HTN, CAD (s/p PCI 2008), HFrEF, CVA 2018, and T2DM presenting with chest pressure and unknown tachycardia that was shocked x1, C 11/1 found to have in-stent restenosis of pLAD and  of RCA with elevated RA and PA pressures and severely decreased. Admitted to CICU for management of cardiogenic shock and ADHF requiring IABP 11/1 -11/7, with hospital course c/b vfib arrest requiring reinsertion of IABP. Not recommended for ATs per HF. Currently listed for transplant status 2.    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 AT evaluation launched 11/10, he is ABO A. He was discussed during TSC on 11/16 and was approved for listing, however was found to be Bacteremic with 11/17 Blc Cx growing mixed cultures Staph epi and Enterococcus faecalis and started on IV Vanco. Repeat cultures from 11/18 reveal NGTD and therefore was cleared by ID for listing.     He appears adequately supported on IABP at 1:1, electrically quiescent on oral Amiodarone. Cr is improving with holding diuretics. Labs otherwise notable for worsening thrombocytopenia. Awaiting suitable donor. Now with GM + rods in blood culture on 11/30 (reported on 12/4), restarted on vancomycin, and status 7, repeat cultures now negative x48 hours (12/6), now status 2 again.       ====================== NEUROLOGY=====================  Anxiety  - No Seroquel/antipsychotics since vfib arrest and prolonged QTC  - Psych Eval, recommended SSRI, but pt. refused   - Psych recommending atarax PRN  - Continue to monitor mental status    PT/Conditioning  - Continue band exercises while on bedrest s/t IABP    ==================== RESPIRATORY======================  Acute Hypoxemic Respiratory Failure  - s/p x2 intubations for cardiogenic pulm edema and the in setting of cardiac arrest, resolved - extubated 11/10  - Currently maintaining >95% sats on room air  - Continue incentive spirometry and monitoring of sp02    Asthma  - c/w symbicort  - On trelegy at home  - Continue to monitor SpO2 with goal >94%    ====================CARDIOVASCULAR==================  Vfib arrest i/s/o ischemia  - Lido gtt off 11/13  - PO Amio load - total of 5g per EP complete 11/17  - Amio dc'd 12/5 for rising LFTs  - Keep K > 4, Mag > 2.2     Cardiogenic shock requiring IABP (11/1- 11/7, 11/9-)  - Likely 2/2 NSTEMI and ADHF  - 11/1 LHC: pLAD 100 % in-stent restenosis & mRCA, 100 %. PCWP 30. IABP placed.  - 11/1 TTE: LV dilated. EF 32 %. Regional WMAs present, mod (grade 2) LV diastolic dysfunction  - 11/2 TTE: EF 22% and + LV thrombus  - IABP swapped 11/20 to RFA, continue 1:1 support - switched sensor to R radial a-line 12/13 due to poor sensing on groin line  - Off Milrinone gtt @ 7:30 am 11/11  - [place] d/c'd due to elevated K levels  - c/w coreg 25 BID for GDMT  - UNOS status 2 as of 12/6  - hydralazine 50 TID and ISD 30 TID for AL reduction    NSTEMI iso stent re-occlusion of pLAD and 100%  of RCA  - EKG on admission w/ LBBB  - DAPT: c/w ASA, Brilinta d/c'd per transplant w/u  - c/w lipitor 80  - cMR deferred given necessity of IABP  - CT sx not recommending CABG, undergoing AT eval    LV thrombus  - c/w heparin gtt w/ therapeutic PTT    ===================== RENAL =========================  Non-oliguric ARIC, resolved   - Baseline Cr: 1-1.22  - Renal US: no evidence of renal artery stenosis  - Trend BMP, lytes daily, replace as needed  - Continue Strict I/Os, avoid nephrotoxins    =============== GASTROINTESTINAL===================  Constipation/ileus, resolved  - regular diet  - bowel reg prn    ===================ENDO====================  Type 2 DM  - A1c 8.3   - Continue lantus, premeal, low ISS  - continue FS    ===================HEMATOLOGIC/ONC ===================  - H/H & plts stable  - Monitor H/H and plts  - VTE PPX: heparin gtt    ==================INFECTIOUS DISEASE================  # Positive blood culture, resolved  - Repeat BCx drawn 11/30 for leukocytosis to 12k - positive on 12/4, G+ rods in anaerobic bottle, suspect contaminant  - Repeat cultures x2 sent 12/4 per transplant ID recs - no growth to date  - Vancomycin by trough (12/4-12/6)  - Final microbial ID: Cutibacterium acnes, per ID this is likely to be a skin contaminant, vanc dc'd.   - Dental eval 12/7 to rule out infectious etiology that would have led to bacteremia, no significant findings on exam.     # Enterococcus faecalis bacteremia, resolved  - BCx 11/17+ for enterococcus faecalis x2, Staph epi x1 (likely contaminant)- pan sensitive   - Urine cx 11/18 + enterococcus faecalis  - BCx 11/18 no growth   - IABP site swapped to RFA 11/20  - s/p Vancomycin 1g q12h (11/18-11/27)  - CT A/P negative for infectious pathology    # COVID, resolved  - Off airborne precautions 11/11    # Pre-transplant ID w/u   - Trend ID recs for serologies   - Colonoscopy 11/17 - normal   - Chest CT 11/17 - improved LLL aeration  - s/p immunizations    ==================DERMATOLOGY================  # Upper Extremity Rash  - Patient developed bilateral upper extremity rash after receiving Hepatitis A & B immunizations on 11/24  - Dermatology consulted 12/5 - not likely to be related to vanco  - Recommend clobetasol 0.05% BID to affected areas   - RVP repeated to rule out viral etiology, negative    # Left neck mass  - US soft tissue:  lateral left neck 2.0 x 0.7 x 1.4 cm heterogeneous complex solid and cystic appearing lesion in subcutaneous soft tissue extending into subjacent strap muscle  - vasc consult: JOSE J venous duplex US ordered    Lines: IABP (11/20), RRA (12/14) · Assessment	  58 male with HTN, CAD (s/p PCI 2008), HFrEF, CVA 2018, and T2DM presenting with chest pressure and unknown tachycardia that was shocked x1, C 11/1 found to have in-stent restenosis of pLAD and  of RCA with elevated RA and PA pressures and severely decreased. Admitted to CICU for management of cardiogenic shock and ADHF requiring IABP 11/1 -11/7, with hospital course c/b vfib arrest requiring reinsertion of IABP. Not recommended for ATs per HF. Currently listed for transplant status 2.    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 AT evaluation launched 11/10, he is ABO A. He was discussed during TSC on 11/16 and was approved for listing, however was found to be Bacteremic with 11/17 Blc Cx growing mixed cultures Staph epi and Enterococcus faecalis and started on IV Vanco. Repeat cultures from 11/18 reveal NGTD and therefore was cleared by ID for listing.     He appears adequately supported on IABP at 1:1, electrically quiescent on oral Amiodarone. Cr is improving with holding diuretics. Labs otherwise notable for worsening thrombocytopenia. Awaiting suitable donor. Now with GM + rods in blood culture on 11/30 (reported on 12/4), restarted on vancomycin, and status 7, repeat cultures now negative x48 hours (12/6), now status 2 again.       ====================== NEUROLOGY=====================  Anxiety  - No Seroquel/antipsychotics since vfib arrest and prolonged QTC  - Psych Eval, recommended SSRI, but pt. refused   - Psych recommending atarax PRN  - Continue to monitor mental status    PT/Conditioning  - Continue band exercises while on bedrest s/t IABP    ==================== RESPIRATORY======================  Acute Hypoxemic Respiratory Failure  - s/p x2 intubations for cardiogenic pulm edema and the in setting of cardiac arrest, resolved - extubated 11/10  - Currently maintaining >95% sats on room air  - Continue incentive spirometry and monitoring of sp02    Asthma  - c/w symbicort  - On trelegy at home  - Continue to monitor SpO2 with goal >94%    ====================CARDIOVASCULAR==================  Vfib arrest i/s/o ischemia  - [place]ido gtt off 11/13  - PO Amio load - total of 5g per EP complete 11/17  - Amio dc'd 12/5 for rising LFTs  - Keep K > 4, Mag > 2.2     Cardiogenic shock requiring IABP (11/1- 11/7, 11/9-)  - Likely 2/2 NSTEMI and ADHF  - 11/1 LHC: pLAD 100 % in-stent restenosis & mRCA, 100 %. PCWP 30. IABP placed.  - 11/1 TTE: LV dilated. EF 32 %. Regional WMAs present, mod (grade 2) LV diastolic dysfunction  - 11/2 TTE: EF 22% and + LV thrombus  - IABP swapped 11/20 to RFA, continue 1:1 support - switched sensor to R radial a-line 12/13 due to poor sensing on groin line  - Off Milrinone gtt @ 7:30 am 11/11  - [place] d/c'd due to elevated K levels  - c/w coreg 25 BID for GDMT  - UNOS status 2 as of 12/6  - hydralazine 50 TID and ISD 30 TID for AL reduction    NSTEMI iso stent re-occlusion of pLAD and 100%  of RCA  - EKG on admission w/ LBBB  - DAPT: c/w ASA, Brilinta d/c'd per transplant w/u  - c/w lipitor 80  - cMR deferred given necessity of IABP  - CT sx not recommending CABG, undergoing AT eval    LV thrombus  - c/w heparin gtt w/ therapeutic PTT    ===================== RENAL =========================  Non-oliguric ARIC, resolved   - Baseline Cr: 1-1.22  - Renal US: no evidence of renal artery stenosis  - Trend BMP, lytes daily, replace as needed  - Continue Strict I/Os, avoid nephrotoxins    =============== GASTROINTESTINAL===================  Constipation/ileus, resolved  - regular diet  - bowel reg prn    ===================ENDO====================  Type 2 DM  - A1c 8.3   - Continue lantus, premeal, low ISS  - continue FS    ===================HEMATOLOGIC/ONC ===================  - H/H & plts stable  - Monitor H/H and plts  - VTE PPX: heparin gtt    ==================INFECTIOUS DISEASE================  # Positive blood culture, resolved  - Repeat BCx drawn 11/30 for leukocytosis to 12k - positive on 12/4, G+ rods in anaerobic bottle, suspect contaminant  - Repeat cultures x2 sent 12/4 per transplant ID recs - no growth to date  - Vancomycin by trough (12/4-12/6)  - Final microbial ID: Cutibacterium acnes, per ID this is likely to be a skin contaminant, vanc dc'd.   - Dental eval 12/7 to rule out infectious etiology that would have led to bacteremia, no significant findings on exam.     # Enterococcus faecalis bacteremia, resolved  - BCx 11/17+ for enterococcus faecalis x2, Staph epi x1 (likely contaminant)- pan sensitive   - Urine cx 11/18 + enterococcus faecalis  - BCx 11/18 no growth   - IABP site swapped to RFA 11/20  - s/p Vancomycin 1g q12h (11/18-11/27)  - CT A/P negative for infectious pathology    # COVID, resolved  - Off airborne precautions 11/11    # Pre-transplant ID w/u   - Trend ID recs for serologies   - Colonoscopy 11/17 - normal   - Chest CT 11/17 - improved LLL aeration  - s/p immunizations    ==================DERMATOLOGY================  # Upper Extremity Rash  - Patient developed bilateral upper extremity rash after receiving Hepatitis A & B immunizations on 11/24  - Dermatology consulted 12/5 - not likely to be related to vanco  - Recommend clobetasol 0.05% BID to affected areas   - RVP repeated to rule out viral etiology, negative    # Left neck mass  - US soft tissue:  lateral left neck 2.0 x 0.7 x 1.4 cm heterogeneous complex solid and cystic appearing lesion in subcutaneous soft tissue extending into subjacent strap muscle  - vasc consult: JOSE J venous duplex US ordered    Lines: IABP (11/20), RRA (12/14)

## 2023-12-20 NOTE — PROGRESS NOTE ADULT - ASSESSMENT
59 yo M with PMHx of HTN, CAD w/ 1 stent in 2009, ICH (2008) presented on 11/1 with abn ekg. Patient presented to [facility] where he was found to have STEMI, recommended to get cath however patient did not want to get it there so he left and came here.   EKG here with ST depression in lateral leads and elevation in anterior leads   Prior to C found to be tachycardic, dyspneic, intubated   C 11/1 with chronic total occlusion of LAD and RCA, with elevated RA and PA pressures  TTE 11/1 with severely decreased EF 32%, s/p IABP 11/1    RVP (11/1) Positive for COVID19  RVP (11/10) Negative  BCx (11/2, 11/4) NGTD  ETT Culture (11/2) NGTD  MRSA/MSSA PCR (11/2, 11/10) Negative  UA (11/10) 1 WBC    CXR (11/10) Clear Lungs  TTE (11/2) Left ventricular thrombus.    #Positive Blood Cultures (11/30 - Cutibacterium)  Growth this far from procurement raises question of contaminant  Given ID as Cutibacterium skin procurement contaminant is favored  Repeat blood cultures with NGTD  --No absolute ID contraindication to re-activate for transplant.   --Continue to follow CBC with diff  --Continue to follow temperature curve    #Rash  ?Secondary to Cefepime on 11/2?  ?Secondary to hepatitis vaccine?  ?Secondary to Vancomycin  --Appreciate Dermatology input  --Follow LFT's and CBC with Diff (For Eosinophil Counts)    #Enterococcus Bacteremia, Leukocytosis, Pre-Heart Transplant Evaluation Serologies  Concern for either central line or urinary source  CT A/P Noncontrast (11/18) No acute process  s/p ten-days of IV Vancomycin last dose received on 11/27    #Encounter to Vaccinate Patient  COVID19: COVID19 Infection This Admission. Would consider Vaccination in ~3 months  Influenza: declined  Pneumococcal: Recommend PCV20  HAV: received first dose 11/24  HBV: received first dose Heplisav 11/24  MMR: Not Mumps Immune, if >1 month until transplant would consider MMR dose  Varicella: Immune  Shingles: recommend Shingrix series  Tdap: received Tdap booster this admission    no absolute contra indications from an ID perspective to pursuing heart transplant    Chao Peters MD  Can be called via Teams  After 5pm/weekends [phone]     57 yo M with PMHx of HTN, CAD w/ 1 stent in 2009, ICH (2008) presented on 11/1 with abn ekg. Patient presented to [facility] where he was found to have STEMI, recommended to get cath however patient did not want to get it there so he left and came here.   EKG here with ST depression in lateral leads and elevation in anterior leads   Prior to C found to be tachycardic, dyspneic, intubated   C 11/1 with chronic total occlusion of LAD and RCA, with elevated RA and PA pressures  TTE 11/1 with severely decreased EF 32%, s/p IABP 11/1    RVP (11/1) Positive for COVID19  RVP (11/10) Negative  BCx (11/2, 11/4) NGTD  ETT Culture (11/2) NGTD  MRSA/MSSA PCR (11/2, 11/10) Negative  UA (11/10) 1 WBC    CXR (11/10) Clear Lungs  TTE (11/2) Left ventricular thrombus.    #Positive Blood Cultures (11/30 - Cutibacterium)  Growth this far from procurement raises question of contaminant  Given ID as Cutibacterium skin procurement contaminant is favored  Repeat blood cultures with NGTD  --No absolute ID contraindication to re-activate for transplant.   --Continue to follow CBC with diff  --Continue to follow temperature curve    #Rash  ?Secondary to Cefepime on 11/2?  ?Secondary to hepatitis vaccine?  ?Secondary to Vancomycin  --Appreciate Dermatology input  --Follow LFT's and CBC with Diff (For Eosinophil Counts)    #Enterococcus Bacteremia, Leukocytosis, Pre-Heart Transplant Evaluation Serologies  Concern for either central line or urinary source  CT A/P Noncontrast (11/18) No acute process  s/p ten-days of IV Vancomycin last dose received on 11/27    #Encounter to Vaccinate Patient  COVID19: COVID19 Infection This Admission. Would consider Vaccination in ~3 months  Influenza: declined  Pneumococcal: Recommend PCV20  HAV: received first dose 11/24  HBV: received first dose Heplisav 11/24  MMR: Not Mumps Immune, if >1 month until transplant would consider MMR dose  Varicella: Immune  Shingles: recommend Shingrix series  Tdap: received Tdap booster this admission    no absolute contra indications from an ID perspective to pursuing heart transplant    Chao Peters MD  Can be called via Teams  After 5pm/weekends [phone]     59 yo M with PMHx of HTN, CAD w/ 1 stent in 2009, ICH (2008) presented on 11/1 with abn ekg. Patient presented to [facility] where he was found to have STEMI, recommended to get cath however patient did not want to get it there so he left and came here.   EKG here with ST depression in lateral leads and elevation in anterior leads   Prior to C found to be tachycardic, dyspneic, intubated   C 11/1 with chronic total occlusion of LAD and RCA, with elevated RA and PA pressures  TTE 11/1 with severely decreased EF 32%, s/p IABP 11/1    RVP (11/1) Positive for COVID19  RVP (11/10) Negative  BCx (11/2, 11/4) NGTD  ETT Culture (11/2) NGTD  MRSA/MSSA PCR (11/2, 11/10) Negative  UA (11/10) 1 WBC    CXR (11/10) Clear Lungs  TTE (11/2) Left ventricular thrombus.    #Positive Blood Cultures (11/30 - Cutibacterium)  Growth this far from procurement raises question of contaminant  Given ID as Cutibacterium skin procurement contaminant is favored  Repeat blood cultures with NGTD  --No absolute ID contraindication to re-activate for transplant.   --Continue to follow CBC with diff  --Continue to follow temperature curve    #Rash  ?Secondary to Cefepime on 11/2?  ?Secondary to hepatitis vaccine?  ?Secondary to Vancomycin  --Appreciate Dermatology input  --Follow LFT's and CBC with Diff (For Eosinophil Counts)    #Enterococcus Bacteremia, Leukocytosis, Pre-Heart Transplant Evaluation Serologies  Concern for either central line or urinary source  CT A/P Noncontrast (11/18) No acute process  s/p ten-days of IV Vancomycin last dose received on 11/27    #Encounter to Vaccinate Patient  COVID19: COVID19 Infection This Admission. Would consider Vaccination in ~3 months  Influenza: declined  Pneumococcal: Recommend PCV20  HAV: received first dose 11/24  HBV: received first dose Heplisav 11/24  MMR: Not Mumps Immune, if >1 month until transplant would consider MMR dose  Varicella: Immune  Shingles: recommend Shingrix series  Tdap: received Tdap booster this admission    no absolute contra indications from an ID perspective to pursuing heart transplant    patient received second dose of heplisav as no abs. after first dose  patient declined Hep B NaaT + donor    Chao Peters MD  Can be called via Teams  After 5pm/weekends [phone]     59 yo M with PMHx of HTN, CAD w/ 1 stent in 2009, ICH (2008) presented on 11/1 with abn ekg. Patient presented to [facility] where he was found to have STEMI, recommended to get cath however patient did not want to get it there so he left and came here.   EKG here with ST depression in lateral leads and elevation in anterior leads   Prior to C found to be tachycardic, dyspneic, intubated   C 11/1 with chronic total occlusion of LAD and RCA, with elevated RA and PA pressures  TTE 11/1 with severely decreased EF 32%, s/p IABP 11/1    RVP (11/1) Positive for COVID19  RVP (11/10) Negative  BCx (11/2, 11/4) NGTD  ETT Culture (11/2) NGTD  MRSA/MSSA PCR (11/2, 11/10) Negative  UA (11/10) 1 WBC    CXR (11/10) Clear Lungs  TTE (11/2) Left ventricular thrombus.    #Positive Blood Cultures (11/30 - Cutibacterium)  Growth this far from procurement raises question of contaminant  Given ID as Cutibacterium skin procurement contaminant is favored  Repeat blood cultures with NGTD  --No absolute ID contraindication to re-activate for transplant.   --Continue to follow CBC with diff  --Continue to follow temperature curve    #Rash  ?Secondary to Cefepime on 11/2?  ?Secondary to hepatitis vaccine?  ?Secondary to Vancomycin  --Appreciate Dermatology input  --Follow LFT's and CBC with Diff (For Eosinophil Counts)    #Enterococcus Bacteremia, Leukocytosis, Pre-Heart Transplant Evaluation Serologies  Concern for either central line or urinary source  CT A/P Noncontrast (11/18) No acute process  s/p ten-days of IV Vancomycin last dose received on 11/27    #Encounter to Vaccinate Patient  COVID19: COVID19 Infection This Admission. Would consider Vaccination in ~3 months  Influenza: declined  Pneumococcal: Recommend PCV20  HAV: received first dose 11/24  HBV: received first dose Heplisav 11/24  MMR: Not Mumps Immune, if >1 month until transplant would consider MMR dose  Varicella: Immune  Shingles: recommend Shingrix series  Tdap: received Tdap booster this admission    no absolute contra indications from an ID perspective to pursuing heart transplant    patient received second dose of heplisav as no abs. after first dose  patient declined Hep B NaaT + donor    Chao Peters MD  Can be called via Teams  After 5pm/weekends [phone]

## 2023-12-20 NOTE — PROGRESS NOTE ADULT - CRITICAL CARE ATTENDING COMMENT
meds; heparin (78), coreg 25 bid, HDZN 50 tid, ISDN 30, asa, statin, insulin,   s/p left neck US: 2X.7 X 1.4 complex solid cystic lesion extending into muscle.   seen by Dr Johnson who feels this is hematoma and non vascular- will repeat US.   HR SR 70-80, IABP 1:1 aug maps 82-90, aug diast 100-110, afeb.   I/O: + 56.   no labs today.   Remains critically ill. UNOS II, ABO A, PRA 0.   Brody Espana

## 2023-12-20 NOTE — PROGRESS NOTE ADULT - SUBJECTIVE AND OBJECTIVE BOX
INFECTIOUS DISEASES FOLLOW UP-- Courtney Peters  [phone]    This is a follow up note for this  58yMale with  Non-ST elevation myocardial infarction (NSTEMI)  interval events- not Hep B immune, pt declined a Hep B NaaT + donor        ROS:  CONSTITUTIONAL:  No fever, good appetite  CARDIOVASCULAR:  No chest pain or palpitations  RESPIRATORY:  No dyspnea  GASTROINTESTINAL:  No nausea, vomiting, diarrhea, or abdominal pain  GENITOURINARY:  No dysuria  NEUROLOGIC:  No headache,     Allergies    penicillins (Unknown)    Intolerances        ANTIBIOTICS/RELEVANT:  antimicrobials    immunologic:  hepatitis B Vaccine - Adult (HEPLISAV-B) 20 MICROGram(s) IntraMuscular once    OTHER:  aspirin  chewable 81 milliGRAM(s) Oral daily  atorvastatin 80 milliGRAM(s) Oral at bedtime  bisacodyl 5 milliGRAM(s) Oral every 12 hours  budesonide  80 MICROgram(s)/formoterol 4.5 MICROgram(s) Inhaler 2 Puff(s) Inhalation two times a day  carvedilol 25 milliGRAM(s) Oral every 12 hours  chlorhexidine 2% Cloths 1 Application(s) Topical daily  heparin  Infusion 1300 Unit(s)/Hr IV Continuous <Continuous>  hydrALAZINE 50 milliGRAM(s) Oral every 8 hours  hydrOXYzine hydrochloride 25 milliGRAM(s) Oral two times a day PRN  insulin glargine Injectable (LANTUS) 12 Unit(s) SubCutaneous at bedtime  insulin lispro (ADMELOG) corrective regimen sliding scale   SubCutaneous three times a day before meals  insulin lispro (ADMELOG) corrective regimen sliding scale   SubCutaneous at bedtime  insulin lispro Injectable (ADMELOG) 9 Unit(s) SubCutaneous three times a day before meals  isosorbide   dinitrate Tablet (ISORDIL) 30 milliGRAM(s) Oral three times a day  polyethylene glycol 3350 17 Gram(s) Oral two times a day  senna 2 Tablet(s) Oral at bedtime      Objective:  Vital Signs Last 24 Hrs  T(C): 36.9 (20 Dec 2023 15:00), Max: 36.9 (20 Dec 2023 15:00)  T(F): 98.5 (20 Dec 2023 15:00), Max: 98.5 (20 Dec 2023 15:00)  HR: 91 (20 Dec 2023 18:00) (72 - 91)  BP: --  BP(mean): --  RR: 20 (20 Dec 2023 18:00) (12 - 32)  SpO2: 95% (20 Dec 2023 18:00) (94% - 98%)    Parameters below as of 20 Dec 2023 18:00  Patient On (Oxygen Delivery Method): room air        PHYSICAL EXAM:  Constitutional:no acute distress  Eyes:MARVEL, EOMI  Ear/Nose/Throat: no oral lesions, 	  Respiratory: clear BL  Cardiovascular: S1S2  Gastrointestinal:soft, (+) BS, no tenderness  Extremities:no e/e/c  No Lymphadenopathy  IV sites not inflammed.    LABS:                        11.8   8.24  )-----------( 193      ( 19 Dec 2023 01:56 )             35.7     12-19    132<L>  |  104  |  30<H>  ----------------------------<  141<H>  4.1   |  17<L>  |  1.03    Ca    9.7      19 Dec 2023 01:56  Phos  3.6     12-19  Mg     1.8     12-19    TPro  6.7  /  Alb  3.8  /  TBili  0.3  /  DBili  x   /  AST  27  /  ALT  75<H>  /  AlkPhos  60  12-19    PT/INR - ( 19 Dec 2023 01:56 )   PT: 12.1 sec;   INR: 1.16 ratio         PTT - ( 19 Dec 2023 01:56 )  PTT:78.9 sec  Urinalysis Basic - ( 19 Dec 2023 01:56 )    Color: x / Appearance: x / SG: x / pH: x  Gluc: 141 mg/dL / Ketone: x  / Bili: x / Urobili: x   Blood: x / Protein: x / Nitrite: x   Leuk Esterase: x / RBC: x / WBC x   Sq Epi: x / Non Sq Epi: x / Bacteria: x        MICROBIOLOGY:      SARS-CoV-2: NotDetec: This Respiratory Panel uses polymerase chain reaction (PCR) to detect for  adenovirus; coronavirus (HKU1, NL63, 229E, OC43); human metapneumovirus  (hMPV); human enterovirus/rhinovirus (Entero/RV); influenza A; influenza  A/H1; influenza A/H3; influenza A/H1-2009; influenza B; parainfluenza  viruses 1, 2, 3, 4; respiratory syncytial virus; Mycoplasma pneumoniae;  Chlamydophila pneumoniae; and SARS-CoV-2. (12.18.23 @ 16:36)          RECENT CULTURES:      RADIOLOGY & ADDITIONAL STUDIES:  < from: Xray Chest 1 View- PORTABLE-Routine (Xray Chest 1 View- PORTABLE-Routine in AM.) (12.20.23 @ 10:02) >  Frontal expiratory view of the chest shows the heart to be normal in   size. IABP marker is 2.6 cm below the aortic knob.    The lungs are clear and there is no evidence of pneumothorax nor pleural   effusion.    Chest one view 12/20/2023 8:51 AM  Compared to the prior study, IABP marker is 2.2 cm below the aortic knob.    IMPRESSION:  No active pulmonary disease. Marker as noted.    < end of copied text >   INFECTIOUS DISEASES FOLLOW UP-- Courtney Peters  [phone]    This is a follow up note for this  58yMale with  Non-ST elevation myocardial infarction (NSTEMI)  interval events- not Hep B immune, pt declined a Hep B NaaT + donor        ROS:  CONSTITUTIONAL:  No fever, good appetite  CARDIOVASCULAR:  No chest pain or palpitations  RESPIRATORY:  No dyspnea  GASTROINTESTINAL:  No nausea, vomiting, diarrhea, or abdominal pain  GENITOURINARY:  No dysuria  NEUROLOGIC:  No headache,     Allergies    penicillins (Unknown)    Intolerances        ANTIBIOTICS/RELEVANT:  antimicrobials    immunologic:  hepatitis B Vaccine - Adult (HEPLISAV-B) 20 MICROGram(s) IntraMuscular once    OTHER:  aspirin  chewable 81 milliGRAM(s) Oral daily  atorvastatin 80 milliGRAM(s) Oral at bedtime  bisacodyl 5 milliGRAM(s) Oral every 12 hours  budesonide  80 MICROgram(s)/formoterol 4.5 MICROgram(s) Inhaler 2 Puff(s) Inhalation two times a day  carvedilol 25 milliGRAM(s) Oral every 12 hours  chlorhexidine 2% Cloths 1 Application(s) Topical daily  heparin  Infusion 1300 Unit(s)/Hr IV Continuous <Continuous>  hydrALAZINE 50 milliGRAM(s) Oral every 8 hours  hydrOXYzine hydrochloride 25 milliGRAM(s) Oral two times a day PRN  insulin glargine Injectable (LANTUS) 12 Unit(s) SubCutaneous at bedtime  insulin lispro (ADMELOG) corrective regimen sliding scale   SubCutaneous three times a day before meals  insulin lispro (ADMELOG) corrective regimen sliding scale   SubCutaneous at bedtime  insulin lispro Injectable (ADMELOG) 9 Unit(s) SubCutaneous three times a day before meals  isosorbide   dinitrate Tablet (ISORDIL) 30 milliGRAM(s) Oral three times a day  polyethylene glycol 3350 17 Gram(s) Oral two times a day  senna 2 Tablet(s) Oral at bedtime      Objective:  Vital Signs Last 24 Hrs  T(C): 36.9 (20 Dec 2023 15:00), Max: 36.9 (20 Dec 2023 15:00)  T(F): 98.5 (20 Dec 2023 15:00), Max: 98.5 (20 Dec 2023 15:00)  HR: 91 (20 Dec 2023 18:00) (72 - 91)  BP: --  BP(mean): --  RR: 20 (20 Dec 2023 18:00) (12 - 32)  SpO2: 95% (20 Dec 2023 18:00) (94% - 98%)    Parameters below as of 20 Dec 2023 18:00  Patient On (Oxygen Delivery Method): room air        PHYSICAL EXAM:  Constitutional:no acute distress  Eyes:MARVEL, EOMI  Ear/Nose/Throat: no oral lesions, 	  Respiratory: clear BL  Cardiovascular: S1S2  Gastrointestinal:soft, (+) BS, no tenderness  Extremities:no e/e/c  No Lymphadenopathy  IV sites not inflammed.    LABS:                        11.8   8.24  )-----------( 193      ( 19 Dec 2023 01:56 )             35.7     12-19    132<L>  |  104  |  30<H>  ----------------------------<  141<H>  4.1   |  17<L>  |  1.03    Ca    9.7      19 Dec 2023 01:56  Phos  3.6     12-19  Mg     1.8     12-19    TPro  6.7  /  Alb  3.8  /  TBili  0.3  /  DBili  x   /  AST  27  /  ALT  75<H>  /  AlkPhos  60  12-19    PT/INR - ( 19 Dec 2023 01:56 )   PT: 12.1 sec;   INR: 1.16 ratio         PTT - ( 19 Dec 2023 01:56 )  PTT:78.9 sec  Urinalysis Basic - ( 19 Dec 2023 01:56 )    Color: x / Appearance: x / SG: x / pH: x  Gluc: 141 mg/dL / Ketone: x  / Bili: x / Urobili: x   Blood: x / Protein: x / Nitrite: x   Leuk Esterase: x / RBC: x / WBC x   Sq Epi: x / Non Sq Epi: x / Bacteria: x        MICROBIOLOGY:      SARS-CoV-2: NotDetec: This Respiratory Panel uses polymerase chain reaction (PCR) to detect for  adenovirus; coronavirus (HKU1, NL63, 229E, OC43); human metapneumovirus  (hMPV); human enterovirus/rhinovirus (Entero/RV); influenza A; influenza  A/H1; influenza A/H3; influenza A/H1-2009; influenza B; parainfluenza  viruses 1, 2, 3, 4; respiratory syncytial virus; Mycoplasma pneumoniae;  Chlamydophila pneumoniae; and SARS-CoV-2. (12.18.23 @ 16:36)          RECENT CULTURES:      RADIOLOGY & ADDITIONAL STUDIES:  < from: Xray Chest 1 View- PORTABLE-Routine (Xray Chest 1 View- PORTABLE-Routine in AM.) (12.20.23 @ 10:02) >  Frontal expiratory view of the chest shows the heart to be normal in   size. IABP marker is 2.6 cm below the aortic knob.    The lungs are clear and there is no evidence of pneumothorax nor pleural   effusion.    Chest one view 12/20/2023 8:51 AM  Compared to the prior study, IABP marker is 2.2 cm below the aortic knob.    IMPRESSION:  No active pulmonary disease. Marker as noted.    < end of copied text >   INFECTIOUS DISEASES FOLLOW UP-- Courtney Peters  [phone]    This is a follow up note for this  58yMale with  Non-ST elevation myocardial infarction (NSTEMI)  interval events- not Hep B immune, pt declined a Hep B NaaT + donor        ROS:  CONSTITUTIONAL:  No fever, good appetite  CARDIOVASCULAR:  No chest pain or palpitations  RESPIRATORY:  No dyspnea  GASTROINTESTINAL:  No nausea, vomiting, diarrhea, or abdominal pain  GENITOURINARY:  No dysuria  NEUROLOGIC:  No headache,     Allergies    penicillins (Unknown)    Intolerances        ANTIBIOTICS/RELEVANT:  antimicrobials    immunologic:  hepatitis B Vaccine - Adult (HEPLISAV-B) 20 MICROGram(s) IntraMuscular once    OTHER:  aspirin  chewable 81 milliGRAM(s) Oral daily  atorvastatin 80 milliGRAM(s) Oral at bedtime  bisacodyl 5 milliGRAM(s) Oral every 12 hours  budesonide  80 MICROgram(s)/formoterol 4.5 MICROgram(s) Inhaler 2 Puff(s) Inhalation two times a day  carvedilol 25 milliGRAM(s) Oral every 12 hours  chlorhexidine 2% Cloths 1 Application(s) Topical daily  heparin  Infusion 1300 Unit(s)/Hr IV Continuous <Continuous>  hydrALAZINE 50 milliGRAM(s) Oral every 8 hours  hydrOXYzine hydrochloride 25 milliGRAM(s) Oral two times a day PRN  insulin glargine Injectable (LANTUS) 12 Unit(s) SubCutaneous at bedtime  insulin lispro (ADMELOG) corrective regimen sliding scale   SubCutaneous three times a day before meals  insulin lispro (ADMELOG) corrective regimen sliding scale   SubCutaneous at bedtime  insulin lispro Injectable (ADMELOG) 9 Unit(s) SubCutaneous three times a day before meals  isosorbide   dinitrate Tablet (ISORDIL) 30 milliGRAM(s) Oral three times a day  polyethylene glycol 3350 17 Gram(s) Oral two times a day  senna 2 Tablet(s) Oral at bedtime      Objective:  Vital Signs Last 24 Hrs  T(C): 36.9 (20 Dec 2023 15:00), Max: 36.9 (20 Dec 2023 15:00)  T(F): 98.5 (20 Dec 2023 15:00), Max: 98.5 (20 Dec 2023 15:00)  HR: 91 (20 Dec 2023 18:00) (72 - 91)  BP: --  BP(mean): --  RR: 20 (20 Dec 2023 18:00) (12 - 32)  SpO2: 95% (20 Dec 2023 18:00) (94% - 98%)    Parameters below as of 20 Dec 2023 18:00  Patient On (Oxygen Delivery Method): room air        PHYSICAL EXAM:  Constitutional:no acute distress  Eyes:MARVEL, EOMI  Ear/Nose/Throat: no oral lesions, 	  Respiratory: clear BL  Cardiovascular: S1S2  Gastrointestinal:soft, (+) BS, no tenderness  Extremities:no e/e/c  IABP right femoral area  No Lymphadenopathy  IV sites not inflammed.    LABS:                        11.8   8.24  )-----------( 193      ( 19 Dec 2023 01:56 )             35.7     12-19    132<L>  |  104  |  30<H>  ----------------------------<  141<H>  4.1   |  17<L>  |  1.03    Ca    9.7      19 Dec 2023 01:56  Phos  3.6     12-19  Mg     1.8     12-19    TPro  6.7  /  Alb  3.8  /  TBili  0.3  /  DBili  x   /  AST  27  /  ALT  75<H>  /  AlkPhos  60  12-19    PT/INR - ( 19 Dec 2023 01:56 )   PT: 12.1 sec;   INR: 1.16 ratio         PTT - ( 19 Dec 2023 01:56 )  PTT:78.9 sec  Urinalysis Basic - ( 19 Dec 2023 01:56 )    Color: x / Appearance: x / SG: x / pH: x  Gluc: 141 mg/dL / Ketone: x  / Bili: x / Urobili: x   Blood: x / Protein: x / Nitrite: x   Leuk Esterase: x / RBC: x / WBC x   Sq Epi: x / Non Sq Epi: x / Bacteria: x        MICROBIOLOGY:      SARS-CoV-2: NotDetec: This Respiratory Panel uses polymerase chain reaction (PCR) to detect for  adenovirus; coronavirus (HKU1, NL63, 229E, OC43); human metapneumovirus  (hMPV); human enterovirus/rhinovirus (Entero/RV); influenza A; influenza  A/H1; influenza A/H3; influenza A/H1-2009; influenza B; parainfluenza  viruses 1, 2, 3, 4; respiratory syncytial virus; Mycoplasma pneumoniae;  Chlamydophila pneumoniae; and SARS-CoV-2. (12.18.23 @ 16:36)          RECENT CULTURES:      RADIOLOGY & ADDITIONAL STUDIES:  < from: Xray Chest 1 View- PORTABLE-Routine (Xray Chest 1 View- PORTABLE-Routine in AM.) (12.20.23 @ 10:02) >  Frontal expiratory view of the chest shows the heart to be normal in   size. IABP marker is 2.6 cm below the aortic knob.    The lungs are clear and there is no evidence of pneumothorax nor pleural   effusion.    Chest one view 12/20/2023 8:51 AM  Compared to the prior study, IABP marker is 2.2 cm below the aortic knob.    IMPRESSION:  No active pulmonary disease. Marker as noted.    < end of copied text >   INFECTIOUS DISEASES FOLLOW UP-- Courtney Peters  [phone]    This is a follow up note for this  58yMale with  Non-ST elevation myocardial infarction (NSTEMI)  interval events- not Hep B immune, pt declined a Hep B NaaT + donor        ROS:  CONSTITUTIONAL:  No fever, good appetite  CARDIOVASCULAR:  No chest pain or palpitations  RESPIRATORY:  No dyspnea  GASTROINTESTINAL:  No nausea, vomiting, diarrhea, or abdominal pain  GENITOURINARY:  No dysuria  NEUROLOGIC:  No headache,     Allergies    penicillins (Unknown)    Intolerances        ANTIBIOTICS/RELEVANT:  antimicrobials    immunologic:  hepatitis B Vaccine - Adult (HEPLISAV-B) 20 MICROGram(s) IntraMuscular once    OTHER:  aspirin  chewable 81 milliGRAM(s) Oral daily  atorvastatin 80 milliGRAM(s) Oral at bedtime  bisacodyl 5 milliGRAM(s) Oral every 12 hours  budesonide  80 MICROgram(s)/formoterol 4.5 MICROgram(s) Inhaler 2 Puff(s) Inhalation two times a day  carvedilol 25 milliGRAM(s) Oral every 12 hours  chlorhexidine 2% Cloths 1 Application(s) Topical daily  heparin  Infusion 1300 Unit(s)/Hr IV Continuous <Continuous>  hydrALAZINE 50 milliGRAM(s) Oral every 8 hours  hydrOXYzine hydrochloride 25 milliGRAM(s) Oral two times a day PRN  insulin glargine Injectable (LANTUS) 12 Unit(s) SubCutaneous at bedtime  insulin lispro (ADMELOG) corrective regimen sliding scale   SubCutaneous three times a day before meals  insulin lispro (ADMELOG) corrective regimen sliding scale   SubCutaneous at bedtime  insulin lispro Injectable (ADMELOG) 9 Unit(s) SubCutaneous three times a day before meals  isosorbide   dinitrate Tablet (ISORDIL) 30 milliGRAM(s) Oral three times a day  polyethylene glycol 3350 17 Gram(s) Oral two times a day  senna 2 Tablet(s) Oral at bedtime      Objective:  Vital Signs Last 24 Hrs  T(C): 36.9 (20 Dec 2023 15:00), Max: 36.9 (20 Dec 2023 15:00)  T(F): 98.5 (20 Dec 2023 15:00), Max: 98.5 (20 Dec 2023 15:00)  HR: 91 (20 Dec 2023 18:00) (72 - 91)  BP: --  BP(mean): --  RR: 20 (20 Dec 2023 18:00) (12 - 32)  SpO2: 95% (20 Dec 2023 18:00) (94% - 98%)    Parameters below as of 20 Dec 2023 18:00  Patient On (Oxygen Delivery Method): room air        PHYSICAL EXAM:  Constitutional:no acute distress  Eyes:MARVEL, EOMI  Ear/Nose/Throat: no oral lesions, 	  Respiratory: clear BL  Cardiovascular: S1S2  Gastrointestinal:soft, (+) BS, no tenderness  Extremities:no e/e/c  IABP right femoral area  No Lymphadenopathy  IV sites not inflammed.    LABS:                        11.8   8.24  )-----------( 193      ( 19 Dec 2023 01:56 )             35.7     12-19    132<L>  |  104  |  30<H>  ----------------------------<  141<H>  4.1   |  17<L>  |  1.03    Ca    9.7      19 Dec 2023 01:56  Phos  3.6     12-19  Mg     1.8     12-19    TPro  6.7  /  Alb  3.8  /  TBili  0.3  /  DBili  x   /  AST  27  /  ALT  75<H>  /  AlkPhos  60  12-19    PT/INR - ( 19 Dec 2023 01:56 )   PT: 12.1 sec;   INR: 1.16 ratio         PTT - ( 19 Dec 2023 01:56 )  PTT:78.9 sec  Urinalysis Basic - ( 19 Dec 2023 01:56 )    Color: x / Appearance: x / SG: x / pH: x  Gluc: 141 mg/dL / Ketone: x  / Bili: x / Urobili: x   Blood: x / Protein: x / Nitrite: x   Leuk Esterase: x / RBC: x / WBC x   Sq Epi: x / Non Sq Epi: x / Bacteria: x        MICROBIOLOGY:      SARS-CoV-2: NotDetec: This Respiratory Panel uses polymerase chain reaction (PCR) to detect for  adenovirus; coronavirus (HKU1, NL63, 229E, OC43); human metapneumovirus  (hMPV); human enterovirus/rhinovirus (Entero/RV); influenza A; influenza  A/H1; influenza A/H3; influenza A/H1-2009; influenza B; parainfluenza  viruses 1, 2, 3, 4; respiratory syncytial virus; Mycoplasma pneumoniae;  Chlamydophila pneumoniae; and SARS-CoV-2. (12.18.23 @ 16:36)          RECENT CULTURES:      RADIOLOGY & ADDITIONAL STUDIES:  < from: Xray Chest 1 View- PORTABLE-Routine (Xray Chest 1 View- PORTABLE-Routine in AM.) (12.20.23 @ 10:02) >  Frontal expiratory view of the chest shows the heart to be normal in   size. IABP marker is 2.6 cm below the aortic knob.    The lungs are clear and there is no evidence of pneumothorax nor pleural   effusion.    Chest one view 12/20/2023 8:51 AM  Compared to the prior study, IABP marker is 2.2 cm below the aortic knob.    IMPRESSION:  No active pulmonary disease. Marker as noted.    < end of copied text >

## 2023-12-20 NOTE — PROGRESS NOTE ADULT - ASSESSMENT
59 yo M with HFrEF (LVIDd 6.4 cm, LVEF 32%), CAD s/p PCI (2008), HTN, DMT2 (A1c 8.3%) and CVA s/p TPA (2018), recently treated for PNA who initially presented to [facility] via EMS after syncope reportedly requiring defibrilation. Treated for ACS but left AMA to come to [facility]. On arrival ECG with ST depression in lateral leads. Intubated 11/1 for respiratory failure. Found to have COVID. L/RHC 11/1revealing  of LAD and RCA, elevated filling pressures and CI of 1.5 prompting placement of IABP. Extubated 11/3. IABP was weaned to off 11/7, then the following day on 11/8, developed PMVT cardiac arrest with prompt CPR and defibrillation, started on IV Lidocaine and Amio. IABP ultimately replaced on 11/9 for worsening hypotension. TTE 11/11 revealing LV thrombus. Mild transaminitis. Now off amio.    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 AT evaluation launched 11/10, he is ABO A. He was discussed during TSC on 11/16 and was approved for listing, however was found to be Bacteremic with 11/17 Blc Cx growing mixed cultures Staph epi and Enterococcus faecalis and started on IV Vanco. Repeat cultures from 11/18 reveal NGTD and therefore was cleared by ID for listing.     GM + rods (Cutibacterium) in blood culture on 11/30 (reported on 12/4), restarted on vancomycin, and status 7, repeat cultures negative x48 hours (12/6).    Currently listed Status 2, ABO A, PRA 0% (12/12).    He currently appears euvolemic He appears adequately supported on IABP at 1:1, electrically quiescent on oral Amiodarone. Cr is normal. Currently stable on current support, awaiting suitable donor.     Cardiac Studies  11/21/23 TTE: limited unable to rule out endocarditis, technically difficult study  11/1123 TTE: LVEF 22% (global), LV thrombus, normal RV size and function, mild MR, trace TR  11/1/23  LHC showed pLAD 70 % stenosis in the ostium portion of the segment and 100 % in-stent restenosis and in mRCA, 100 % stenosis.   11/1/23 RHC; RA 23 Vw 24, PA 52/33/40, PCWP 30 Vw 33, AO 85/66/73, PA sat 54.4%, CO/CI (F) 2.96/1.44, IABP was placed during the cath through R common femoral artery.   11/1/23 TTE: LVIDd 6.4cm , LVEF 32%, regional WMA, grade II DD,  TAPSE 1.7cm, mld MR, trace TR,      57 yo M with HFrEF (LVIDd 6.4 cm, LVEF 32%), CAD s/p PCI (2008), HTN, DMT2 (A1c 8.3%) and CVA s/p TPA (2018), recently treated for PNA who initially presented to [facility] via EMS after syncope reportedly requiring defibrilation. Treated for ACS but left AMA to come to [facility]. On arrival ECG with ST depression in lateral leads. Intubated 11/1 for respiratory failure. Found to have COVID. L/RHC 11/1revealing  of LAD and RCA, elevated filling pressures and CI of 1.5 prompting placement of IABP. Extubated 11/3. IABP was weaned to off 11/7, then the following day on 11/8, developed PMVT cardiac arrest with prompt CPR and defibrillation, started on IV Lidocaine and Amio. IABP ultimately replaced on 11/9 for worsening hypotension. TTE 11/11 revealing LV thrombus. Mild transaminitis. Now off amio.    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 AT evaluation launched 11/10, he is ABO A. He was discussed during TSC on 11/16 and was approved for listing, however was found to be Bacteremic with 11/17 Blc Cx growing mixed cultures Staph epi and Enterococcus faecalis and started on IV Vanco. Repeat cultures from 11/18 reveal NGTD and therefore was cleared by ID for listing.     GM + rods (Cutibacterium) in blood culture on 11/30 (reported on 12/4), restarted on vancomycin, and status 7, repeat cultures negative x48 hours (12/6).    Currently listed Status 2, ABO A, PRA 0% (12/12).    He currently appears euvolemic He appears adequately supported on IABP at 1:1, electrically quiescent on oral Amiodarone. Cr is normal. Currently stable on current support, awaiting suitable donor.     Cardiac Studies  11/21/23 TTE: limited unable to rule out endocarditis, technically difficult study  11/1123 TTE: LVEF 22% (global), LV thrombus, normal RV size and function, mild MR, trace TR  11/1/23  LHC showed pLAD 70 % stenosis in the ostium portion of the segment and 100 % in-stent restenosis and in mRCA, 100 % stenosis.   11/1/23 RHC; RA 23 Vw 24, PA 52/33/40, PCWP 30 Vw 33, AO 85/66/73, PA sat 54.4%, CO/CI (F) 2.96/1.44, IABP was placed during the cath through R common femoral artery.   11/1/23 TTE: LVIDd 6.4cm , LVEF 32%, regional WMA, grade II DD,  TAPSE 1.7cm, mld MR, trace TR,

## 2023-12-20 NOTE — CONSULT NOTE ADULT - NS ATTEND AMEND GEN_ALL_CORE FT
likely secondary prevention ICD predischarge
Mr. Hardy is a 58 year old male with h/o of CAD s/p PCI in 2009 who presented to OSH with intermittent cough and chest pressure with concern for MI and planned for cath but left and came to [facility].  At time of cardiac cath, patient with increased respiratory distress, hypertension and likely flash pulmonary edema requiring intubation.  Cardiac cath with  LAD, RCA and moderate (50% lesions) in LCX.  His EF is 25-30% with poor LV function that has been consistently depressed in the distal LAD territory since 2018.   His RHC had grossly elevated filling pressures with poor index prior to IABP placement.  Shock call was initiated but patient had improved hemodynamically with resolution of lactate.  He was also found to be COVID positive which may have also been a precipitating event for this recent decompensation.   Can consider viability but options for PCI may be limited and discussed with interventional and he is very high risk for surgical revascularization with limited potential benefit given unclear distal targets in the chronically diseased  LAD and RCA vessels and poor LV function that has been consistently depressed in the LAD territory since 2018.  Would also launch advanced therapies evaluation and would support patient with escalation of temporary MCS if clinically warranted.
I reviewed the patients imaging, medical records, reports. With the patient  I discussed the risks, benefits and alternatives to both LVAD and transplant surgery. The pros, cons and risks associated with both LVAD implant and transplant surgery were discussed in detail. I also reviewed and discussed medications and specifics associated with both LVAD and transplant as well as transplant eligibility/listing. Hepatitis C, NRP-DCD and high risk donors.     The surgical procedure for LVAD implant or heart transplantation will last approximately six hours. You will be cared for right after surgery in the Cardiothoracic ICU (CTU). How long you stay in the hospital depends on how quickly you recover, your medical history and any complications you may have. The stay is usually around 21 days. After surgery, you will be taken straight to the CTU. You will stay asleep from the anesthesia and slowly wake up over the next day or two. You will be on a machine to help you breathe until you can breathe on your own. You will have many IVs and monitoring lines, several drains and a tube to collect urine from your bladder. While the breathing tube is in place, you will be unable to talk, but a nurse will be close by at all times.    As you recover, many of the IVs and tubes will be removed, and the nurses will increase your activity. They will also teach you how to care for yourself at home. You will learn about your medications and how to take care of your surgical incision (wound). Your family or friends will be asked to also learn these things so they can help you at home.    Once you are discharged from the hospital, you will still be recovering for many weeks. You will slowly get better. Your activity will still have limits, and it will take time to return to your usual strength.    I discussed the risks, benefits and alternatives to LVAD and transplant surgery. Risks included but not limited to bleeding, stroke, re-operation, arrhythmia, myocardial Infarction, primary graft dysfunction, kidney problems, liver problems, lungs problems, blood transfusion, permanent pacemaker implantation, infections, mechanical support and death. I discussed the various approaches in detail. His A1c is currently elevated at 8.3 and awaiting for Elastography to be completed. Overall I feel that the patient would be consider an acceptable candidate for LVAD implant and/or heart transplant. All questions and concerns were addressed and patient wishes to proceed with LVAD implant and transplant evaluation at this time.
Small lump L neck  I think this looks like hematoma on the duplex, which would make sense given prior line in that area    Keep an eye on this  If it persists or worsens then consider dedicated imaging of the neck  Hopefully it resolves on its own    [place]
59 yo M with HTN, CAD (s/p PCI 2008), HFrEF (EF severely reduced 2018) not on GDMT, CVA 2018 (requiring tpA), DM presenting with c/o SOB for a week before presenting to the hospital. He had been treated for PNA. He was admitted with c/o chest pressure. Per family, patient passed out, EMS was called and reportedly defibrillated the patient. He was taken to [facility] where he was found to have ACS but he left AMA and came to [facility].    On arrival, ECG showed ST depression in lateral leads, no c/o chest pain. Pro-BNP 4k. [facility] 11/1: pLAD 70 % stenosis in the ostium portion of the segment and 100 % in-stent restenosis. mRCA, 100 % stenosis. RA 23, PA 51/33, wedge 31, PA sat 54%, CI 1.5. S/p IABP.     Patient is euvolemic on exam. He is on Levophed, however, MAP >90s. Not on inotropic support. PA pressure in high 20s with CVP single digit. MVo2 in 70s with CI >2.5. Viability study requested by interventional prior to considering any intervention. The only option available is CMR. Given stable hemodynamics, I would recommended weaning Levophed followed by IABP. IV vasodilator can be used once IABP removed and then transition to oral agents. No need for diuretics at the moment. Management of ventilator/sedation per CCU team.

## 2023-12-20 NOTE — PROGRESS NOTE ADULT - PROBLEM SELECTOR PLAN 1
-Listen UNOS status 2.   - L IABP at 1:1, placed 11/9. Exchange to LFA given high grade bacteremia on 11/20. On AC with Heparin infusion (also for LV thrombus)  - Continue Coreg 25 mg BID hold for MAP <65  - Continue HDZN 50 mg TID and ISDN 30 mg TID, hold for MAP <65  - Off romel given HyperK  - AT evaluation: Accepted for transplant, currently listed UNOS Status 2. ABO A. PRA 0% 12/12.    However, downgraded to status 7 on 12/4 given + blood culture. But now re-upgraded to status 2, awaiting suitable donor  - Please keep primary Dr. Banuelos [phone] in the loop per family request. -Listen UNOS status 2.   - L IABP at 1:1, placed 11/9. Exchange to LFA given high grade bacteremia on 11/20. On AC with Heparin infusion (also for LV thrombus)  - Continue Coreg 25 mg BID hold for MAP <65  - Continue HDZN 50 mg TID and ISDN 30 mg TID, hold for MAP <65  - Off romel given HyperK  - AT evaluation: Accepted for transplant, currently listed UNOS Status 2. ABO A. PRA 0% 12/12.    However, downgraded to status 7 on 12/4 given + blood culture. But now re-upgraded to status 2, awaiting suitable donor  - Please keep primary Dr. Banuelos [phone] in the loop per family request.

## 2023-12-20 NOTE — PROGRESS NOTE ADULT - SUBJECTIVE AND OBJECTIVE BOX
DEVORAH VALENCIA  MRN-[id number]  Patient is a 58y old  Male who presents with a chief complaint of heart failure (20 Dec 2023 19:27)    HPI:  pt seen and approx 1:30 pm  in [facility]    59yo M w/ hx HTN, CAD w/ 1 stent in , ICH () presenting with abn ekg. Patient presented to [facility] where he was found to have STEMI, recommended to get cath however patient did not want to get it there so it left and came here.  Patient initially had cough, congestion, fever, was placed on antibiotics on .  Started feeling nauseous and had a presyncopal event after which he presented to ED last night.  Had chest pain as well.  Chest pain is midsternal.  Not currently having chest pain.  Received 4 aspirin 30 min pta. (2023 15:11)      Hospital Course:    24 HOUR EVENTS:    REVIEW OF SYSTEMS:    CONSTITUTIONAL: No weakness, fevers or chills  EYES/ENT: No visual changes;  No vertigo or throat pain   NECK: No pain or stiffness  RESPIRATORY: No cough, wheezing, hemoptysis; No shortness of breath  CARDIOVASCULAR: No chest pain or palpitations  GASTROINTESTINAL: No abdominal or epigastric pain. No nausea, vomiting, or hematemesis; No diarrhea or constipation. No melena or hematochezia.  GENITOURINARY: No dysuria, frequency or hematuria  NEUROLOGICAL: No numbness or weakness  SKIN: No itching, rashes      ICU Vital Signs Last 24 Hrs  T(C): 36.9 (20 Dec 2023 19:00), Max: 36.9 (20 Dec 2023 15:00)  T(F): 98.5 (20 Dec 2023 19:00), Max: 98.5 (20 Dec 2023 15:00)  HR: 80 (20 Dec 2023 20:00) (72 - 91)  BP: --  BP(mean): --  ABP: --  ABP(mean): --  RR: 14 (20 Dec 2023 20:00) (12 - 32)  SpO2: 97% (20 Dec 2023 20:00) (94% - 98%)    O2 Parameters below as of 20 Dec 2023 20:00  Patient On (Oxygen Delivery Method): room air            CVP(mm Hg): --  CO: --  CI: --  PA: --  PA(mean): --  PA(direct): --  PCWP: --  LA: --  RA: --  SVR: --  SVRI: --  PVR: --  PVRI: --  I&O's Summary    19 Dec 2023 07:01  -  20 Dec 2023 07:00  --------------------------------------------------------  IN: 1056 mL / OUT: 1400 mL / NET: -344 mL    20 Dec 2023 07:01  -  20 Dec 2023 20:43  --------------------------------------------------------  IN: 662 mL / OUT: 700 mL / NET: -38 mL        CAPILLARY BLOOD GLUCOSE    CAPILLARY BLOOD GLUCOSE      POCT Blood Glucose.: 177 mg/dL (20 Dec 2023 17:05)      PHYSICAL EXAM:  GENERAL: No acute distress, well-developed  HEAD:  Atraumatic, Normocephalic  EYES: EOMI, PERRLA, conjunctiva and sclera clear  NECK: Supple, no lymphadenopathy, no JVD  CHEST/LUNG: CTAB; No wheezes, rales, or rhonchi  HEART: Regular rate and rhythm. Normal S1/S2. No murmurs, rubs, or gallops  ABDOMEN: Soft, non-tender, non-distended; normal bowel sounds, no organomegaly  EXTREMITIES:  2+ peripheral pulses b/l, No clubbing, cyanosis, or edema  NEUROLOGY: A&O x 3, no focal deficits  SKIN: No rashes or lesions    ============================I/O===========================   I&O's Detail    19 Dec 2023 07:01  -  20 Dec 2023 07:00  --------------------------------------------------------  IN:    Heparin: 336 mL    Oral Fluid: 720 mL  Total IN: 1056 mL    OUT:    Voided (mL): 1400 mL  Total OUT: 1400 mL    Total NET: -344 mL      20 Dec 2023 07:01  -  20 Dec 2023 20:43  --------------------------------------------------------  IN:    Heparin: 182 mL    Oral Fluid: 480 mL  Total IN: 662 mL    OUT:    Voided (mL): 700 mL  Total OUT: 700 mL    Total NET: -38 mL        ============================ LABS =========================                        11.8   8.24  )-----------( 193      ( 19 Dec 2023 01:56 )             35.7     12-    132<L>  |  104  |  30<H>  ----------------------------<  141<H>  4.1   |  17<L>  |  1.03    Ca    9.7      19 Dec 2023 01:56  Phos  3.6     -  Mg     1.8         TPro  6.7  /  Alb  3.8  /  TBili  0.3  /  DBili  x   /  AST  27  /  ALT  75<H>  /  AlkPhos  60  -                LIVER FUNCTIONS - ( 19 Dec 2023 01:56 )  Alb: 3.8 g/dL / Pro: 6.7 g/dL / ALK PHOS: 60 U/L / ALT: 75 U/L / AST: 27 U/L / GGT: x           PT/INR - ( 19 Dec 2023 01:56 )   PT: 12.1 sec;   INR: 1.16 ratio         PTT - ( 19 Dec 2023 01:56 )  PTT:78.9 sec      Urinalysis Basic - ( 19 Dec 2023 01:56 )    Color: x / Appearance: x / SG: x / pH: x  Gluc: 141 mg/dL / Ketone: x  / Bili: x / Urobili: x   Blood: x / Protein: x / Nitrite: x   Leuk Esterase: x / RBC: x / WBC x   Sq Epi: x / Non Sq Epi: x / Bacteria: x      ======================Micro/Rad/Cardio=================  Telemtry: Reviewed   EKG: Reviewed  CXR: Reviewed  Culture: Reviewed   Echo:   Cath: Cardiac Cath Lab - Adult:   [facility]  Department of Cardiology  [address] [place] [address]  [phone]  Cath Lab Report -- Comprehensive Report  Patient: LD VALENCIA  Study date: 2017  Account number: [id number]  MR number: [id number]  : 1965  Gender: Male  Race: W  Case Physician(s):  Jairon Holguin M.D.  Referring Physician:  Luc Lynn M.D.  INDICATIONS: Unstable angina - CCS4.  HISTORY: The patient has a history of coronary artery disease. The patient  hashypertension and medication-treated dyslipidemia.  PROCEDURE:  --  Left heart catheterization with ventriculography.  --  Left coronary angiography.  --  Right coronary angiography.  TECHNIQUE: The risks and alternatives of the procedures and conscious  sedation were explained to the patient and informed consent was obtained.  Cardiac catheterization performed electively.  Local anesthetic given. Right radial artery access. A 6FR PRELUDE KIT was  inserted in the vessel. Left heart catheterization. Ventriculography was  performed. A 5FR FR4.0 EXPO catheter was utilized. Left coronary artery  angiography. The vessel was injected utilizing a 5FR FL3.5 EXPO catheter.  Right coronary artery angiography. The vessel was injected utilizing a 5FR  FR4.0 EXPO catheter. RADIATION EXPOSURE: 1.1 min.  CONTRAST GIVEN: Omnipaque 55 ml.  MEDICATIONS GIVEN: Midazolam, 1 mg, IV. Fentanyl, 25 mcg, IV. Verapamil  (Isoptin, Calan, Covera), 2.5 mg, IA. Heparin, 3000 units, IA.  VENTRICLES: Global left ventricular function was moderately depressed. EF  estimated was 40 %.  CORONARY VESSELS: The coronary circulation is right dominant.  LM:   --  LM: Normal.  LAD:   --  Proximal LAD: There was a 50 % stenosis.  CX:   --  Circumflex: Normal.  RCA:   --  Mid RCA: There was a 40 % stenosis.  --  Distal RCA: There was a 50 % stenosis.  COMPLICATIONS: There were no complications.  DIAGNOSTIC RECOMMENDATIONS: The patient should continue with the present  medications.  Prepared and signed by  Jairon Holguin M.D.  Signed 2017 12:20:13  HEMODYNAMIC TABLES  Pressures:  Baseline/ Room Air  Pressures:  - HR: 78  Pressures:  - Rhythm:  Pressures:  -- Aortic Pressure (S/D/M): --/--/99  Pressures:  -- Left Ventricle (s/edp): 157/39/--  Outputs:  Baseline/ Room Air  Outputs:  -- CALCULATIONS[age]  Outputs:  -- CALCULATIONS: Body Surface Area: 2.05  Outputs:  -- CALCULATIONS: Height in cm: 175.00  Outputs:  -- CALCULATIONS: Sex: Male  Outputs:  -- CALCULATIONS: Weight in k.40 (17 @ 21:55)    ======================================================  PAST MEDICAL & SURGICAL HISTORY:  HTN (hypertension)      CAD (coronary artery disease)  ; stent      Intracranial hemorrhage        Respiratory arrest        Myocardial infarction, unspecified MI type, unspecified artery      History of coronary artery stent placement        ====================ASSESSMENT ==============  58 male with HTN, CAD (s/p PCI ), HFrEF, CVA , and T2DM presenting with chest pressure and unknown tachycardia that was shocked x1, [facility]  found to have in-stent restenosis of pLAD and  of RCA with elevated RA and PA pressures and severely decreased. Admitted to CICU for management of cardiogenic shock and ADHF requiring IABP  -, with hospital course c/b vfib arrest requiring reinsertion of IABP. Not recommended for ATs per HF. Currently listed for transplant status 2.    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 AT evaluation launched 11/10, he is ABO A. He was discussed during TSC on  and was approved for listing, however was found to be Bacteremic with  Blc Cx growing mixed cultures Staph epi and Enterococcus faecalis and started on IV Vanco. Repeat cultures from  reveal NGTD and therefore was cleared by ID for listing.     He appears adequately supported on IABP at 1:1, electrically quiescent on oral Amiodarone. Cr is improving with holding diuretics. Labs otherwise notable for worsening thrombocytopenia. Awaiting suitable donor. Now with GM + rods in blood culture on  (reported on ), restarted on vancomycin, and status 7, repeat cultures now negative x48 hours (), now status 2 again.       ====================== NEUROLOGY=====================  Anxiety  - No Seroquel/antipsychotics since vfib arrest and prolonged QTC  - Psych Eval, recommended SSRI, but pt. refused   - Psych recommending atarax PRN  - Continue to monitor mental status    PT/Conditioning  - Continue band exercises while on bedrest s/t IABP    ==================== RESPIRATORY======================  Acute Hypoxemic Respiratory Failure  - s/p x2 intubations for cardiogenic pulm edema and the in setting of cardiac arrest, resolved - extubated 11/10  - Currently maintaining >95% sats on room air  - Continue incentive spirometry and monitoring of sp02    Asthma  - c/w symbicort  - On trelegy at home  - Continue to monitor SpO2 with goal >94%    ====================CARDIOVASCULAR==================  Vfib arrest i/s/o ischemia  - Lido gtt off   - PO Amio load - total of 5g per EP complete   - Amio dc'd  for rising LFTs  - Keep K > 4, Mag > 2.2     Cardiogenic shock requiring IABP (- , -)  - Likely 2/2 NSTEMI and ADHF  -  LHC: pLAD 100 % in-stent restenosis & mRCA, 100 %. PCWP 30. IABP placed.  -  TTE: LV dilated. EF 32 %. Regional WMAs present, mod (grade 2) LV diastolic dysfunction  -  TTE: EF 22% and + LV thrombus  - IABP swapped  to RFA, continue 1:1 support - switched sensor to R radial a-line  due to poor sensing on groin line  - Off Milrinone gtt @ 7:30 am   - James d/c'd due to elevated K levels  - c/w coreg 25 BID for GDMT  - UNOS status 2 as of   - hydralazine 50 TID and ISD 30 TID for AL reduction    NSTEMI iso stent re-occlusion of pLAD and 100%  of RCA  - EKG on admission w/ LBBB  - DAPT: c/w ASA, Brilinta d/c'd per transplant w/u  - c/w lipitor 80  - cMR deferred given necessity of IABP  - CT sx not recommending CABG, undergoing AT eval    LV thrombus  - c/w heparin gtt w/ therapeutic PTT    ===================== RENAL =========================  Non-oliguric ARIC, resolved   - Baseline Cr: 1-  - Renal US: no evidence of renal artery stenosis  - Trend BMP, lytes daily, replace as needed  - Continue Strict I/Os, avoid nephrotoxins    =============== GASTROINTESTINAL===================  Constipation/ileus, resolved  - regular diet  - bowel reg prn    ===================ENDO====================  Type 2 DM  - A1c 8.3   - Continue lantus, premeal, low ISS  - continue FS    ===================HEMATOLOGIC/ONC ===================  - H/H & plts stable  - Monitor H/H and plts  - VTE PPX: heparin gtt    ==================INFECTIOUS DISEASE================  Positive blood culture, resolved  - Repeat BCx drawn  for leukocytosis to 12k - positive on , G+ rods in anaerobic bottle, suspect contaminant  - Repeat cultures x2 sent  per transplant ID recs - no growth to date  - Vancomycin by trough (-)  - Final microbial ID: Cutibacterium acnes, per ID this is likely to be a skin contaminant, vanc dc'd.   - Dental eval  to rule out infectious etiology that would have led to bacteremia, no significant findings on exam.     Enterococcus faecalis bacteremia, resolved  - BCx + for enterococcus faecalis x2, Staph epi x1 (likely contaminant)- pan sensitive   - Urine cx  + enterococcus faecalis  - BCx  no growth   - IABP site swapped to RFA   - s/p Vancomycin 1g q12h (-)  - CT A/P negative for infectious pathology    COVID, resolved  - Off airborne precautions     Pre-transplant ID w/u   - Trend ID recs for serologies   - Colonoscopy  - normal   - Chest CT  - improved LLL aeration  - s/p immunizations    ==================DERMATOLOGY================   Upper Extremity Rash  - Patient developed bilateral upper extremity rash after receiving Hepatitis A & B immunizations on   - Dermatology consulted  - not likely to be related to vanco  - Recommend clobetasol 0.05% BID to affected areas   - RVP repeated to rule out viral etiology, negative    Left neck mass  - US soft tissue:  lateral left neck 2.0 x 0.7 x 1.4 cm heterogeneous complex solid and cystic appearing lesion in subcutaneous soft tissue extending into subjacent strap muscle  - vasc consult: LUE venous duplex US ordered    Lines: IABP (), RRA ()      Patient requires continuous monitoring with bedside rhythm monitoring, pulse ox monitoring, and intermittent blood gas analysis. Care plan discussed with ICU care team. Patient remained critical and at risk for life threatening decompensation.  Patient seen, examined and plan discussed with CCU team during rounds.     I have personally provided ____ minutes of critical care time excluding time spent on separate procedures, in addition to initial critical care time provided by the CICU Attending, Dr. Durand.      By signing my name below, I, Rosalba Tapia, attest that this documentation has been prepared under the direction and in the presence of Kitty Braden NP   Electronically signed: Rosalba Phillips, 23 @ 20:43    I, Kitty Braden, personally performed the services described in this documentation. all medical record entries made by the scribe were at my direction and in my presence. I have reviewed the chart and agree that the record reflects my personal performance and is accurate and complete  Electronically signed: Kitty Braden NP        DEVORAH VALENCIA  MRN-[id number]  Patient is a 58y old  Male who presents with a chief complaint of heart failure (20 Dec 2023 19:27)    HPI:  pt seen and approx 1:30 pm  in [facility]    57yo M w/ hx HTN, CAD w/ 1 stent in , ICH () presenting with abn ekg. Patient presented to [facility] where he was found to have STEMI, recommended to get cath however patient did not want to get it there so it left and came here.  Patient initially had cough, congestion, fever, was placed on antibiotics on .  Started feeling nauseous and had a presyncopal event after which he presented to ED last night.  Had chest pain as well.  Chest pain is midsternal.  Not currently having chest pain.  Received 4 aspirin 30 min pta. (2023 15:11)      Hospital Course:    24 HOUR EVENTS:    REVIEW OF SYSTEMS:    CONSTITUTIONAL: No weakness, fevers or chills  EYES/ENT: No visual changes;  No vertigo or throat pain   NECK: No pain or stiffness  RESPIRATORY: No cough, wheezing, hemoptysis; No shortness of breath  CARDIOVASCULAR: No chest pain or palpitations  GASTROINTESTINAL: No abdominal or epigastric pain. No nausea, vomiting, or hematemesis; No diarrhea or constipation. No melena or hematochezia.  GENITOURINARY: No dysuria, frequency or hematuria  NEUROLOGICAL: No numbness or weakness  SKIN: No itching, rashes      ICU Vital Signs Last 24 Hrs  T(C): 36.9 (20 Dec 2023 19:00), Max: 36.9 (20 Dec 2023 15:00)  T(F): 98.5 (20 Dec 2023 19:00), Max: 98.5 (20 Dec 2023 15:00)  HR: 80 (20 Dec 2023 20:00) (72 - 91)  BP: --  BP(mean): --  ABP: --  ABP(mean): --  RR: 14 (20 Dec 2023 20:00) (12 - 32)  SpO2: 97% (20 Dec 2023 20:00) (94% - 98%)    O2 Parameters below as of 20 Dec 2023 20:00  Patient On (Oxygen Delivery Method): room air            CVP(mm Hg): --  CO: --  CI: --  PA: --  PA(mean): --  PA(direct): --  PCWP: --  LA: --  RA: --  SVR: --  SVRI: --  PVR: --  PVRI: --  I&O's Summary    19 Dec 2023 07:01  -  20 Dec 2023 07:00  --------------------------------------------------------  IN: 1056 mL / OUT: 1400 mL / NET: -344 mL    20 Dec 2023 07:01  -  20 Dec 2023 20:43  --------------------------------------------------------  IN: 662 mL / OUT: 700 mL / NET: -38 mL        CAPILLARY BLOOD GLUCOSE    CAPILLARY BLOOD GLUCOSE      POCT Blood Glucose.: 177 mg/dL (20 Dec 2023 17:05)      PHYSICAL EXAM:  GENERAL: No acute distress, well-developed  HEAD:  Atraumatic, Normocephalic  EYES: EOMI, PERRLA, conjunctiva and sclera clear  NECK: Supple, no lymphadenopathy, no JVD  CHEST/LUNG: CTAB; No wheezes, rales, or rhonchi  HEART: Regular rate and rhythm. Normal S1/S2. No murmurs, rubs, or gallops  ABDOMEN: Soft, non-tender, non-distended; normal bowel sounds, no organomegaly  EXTREMITIES:  2+ peripheral pulses b/l, No clubbing, cyanosis, or edema  NEUROLOGY: A&O x 3, no focal deficits  SKIN: No rashes or lesions    ============================I/O===========================   I&O's Detail    19 Dec 2023 07:01  -  20 Dec 2023 07:00  --------------------------------------------------------  IN:    Heparin: 336 mL    Oral Fluid: 720 mL  Total IN: 1056 mL    OUT:    Voided (mL): 1400 mL  Total OUT: 1400 mL    Total NET: -344 mL      20 Dec 2023 07:01  -  20 Dec 2023 20:43  --------------------------------------------------------  IN:    Heparin: 182 mL    Oral Fluid: 480 mL  Total IN: 662 mL    OUT:    Voided (mL): 700 mL  Total OUT: 700 mL    Total NET: -38 mL        ============================ LABS =========================                        11.8   8.24  )-----------( 193      ( 19 Dec 2023 01:56 )             35.7     12-    132<L>  |  104  |  30<H>  ----------------------------<  141<H>  4.1   |  17<L>  |  1.03    Ca    9.7      19 Dec 2023 01:56  Phos  3.6     -  Mg     1.8         TPro  6.7  /  Alb  3.8  /  TBili  0.3  /  DBili  x   /  AST  27  /  ALT  75<H>  /  AlkPhos  60  -                LIVER FUNCTIONS - ( 19 Dec 2023 01:56 )  Alb: 3.8 g/dL / Pro: 6.7 g/dL / ALK PHOS: 60 U/L / ALT: 75 U/L / AST: 27 U/L / GGT: x           PT/INR - ( 19 Dec 2023 01:56 )   PT: 12.1 sec;   INR: 1.16 ratio         PTT - ( 19 Dec 2023 01:56 )  PTT:78.9 sec      Urinalysis Basic - ( 19 Dec 2023 01:56 )    Color: x / Appearance: x / SG: x / pH: x  Gluc: 141 mg/dL / Ketone: x  / Bili: x / Urobili: x   Blood: x / Protein: x / Nitrite: x   Leuk Esterase: x / RBC: x / WBC x   Sq Epi: x / Non Sq Epi: x / Bacteria: x      ======================Micro/Rad/Cardio=================  Telemtry: Reviewed   EKG: Reviewed  CXR: Reviewed  Culture: Reviewed   Echo:   Cath: Cardiac Cath Lab - Adult:   [facility]  Department of Cardiology  [address] [place] [address]  [phone]  Cath Lab Report -- Comprehensive Report  Patient: LD VALENCIA  Study date: 2017  Account number: [id number]  MR number: [id number]  : 1965  Gender: Male  Race: W  Case Physician(s):  Jairon Holguin M.D.  Referring Physician:  Luc Lynn M.D.  INDICATIONS: Unstable angina - CCS4.  HISTORY: The patient has a history of coronary artery disease. The patient  hashypertension and medication-treated dyslipidemia.  PROCEDURE:  --  Left heart catheterization with ventriculography.  --  Left coronary angiography.  --  Right coronary angiography.  TECHNIQUE: The risks and alternatives of the procedures and conscious  sedation were explained to the patient and informed consent was obtained.  Cardiac catheterization performed electively.  Local anesthetic given. Right radial artery access. A 6FR PRELUDE KIT was  inserted in the vessel. Left heart catheterization. Ventriculography was  performed. A 5FR FR4.0 EXPO catheter was utilized. Left coronary artery  angiography. The vessel was injected utilizing a 5FR FL3.5 EXPO catheter.  Right coronary artery angiography. The vessel was injected utilizing a 5FR  FR4.0 EXPO catheter. RADIATION EXPOSURE: 1.1 min.  CONTRAST GIVEN: Omnipaque 55 ml.  MEDICATIONS GIVEN: Midazolam, 1 mg, IV. Fentanyl, 25 mcg, IV. Verapamil  (Isoptin, Calan, Covera), 2.5 mg, IA. Heparin, 3000 units, IA.  VENTRICLES: Global left ventricular function was moderately depressed. EF  estimated was 40 %.  CORONARY VESSELS: The coronary circulation is right dominant.  LM:   --  LM: Normal.  LAD:   --  Proximal LAD: There was a 50 % stenosis.  CX:   --  Circumflex: Normal.  RCA:   --  Mid RCA: There was a 40 % stenosis.  --  Distal RCA: There was a 50 % stenosis.  COMPLICATIONS: There were no complications.  DIAGNOSTIC RECOMMENDATIONS: The patient should continue with the present  medications.  Prepared and signed by  Jairon Holguin M.D.  Signed 2017 12:20:13  HEMODYNAMIC TABLES  Pressures:  Baseline/ Room Air  Pressures:  - HR: 78  Pressures:  - Rhythm:  Pressures:  -- Aortic Pressure (S/D/M): --/--/99  Pressures:  -- Left Ventricle (s/edp): 157/39/--  Outputs:  Baseline/ Room Air  Outputs:  -- CALCULATIONS[age]  Outputs:  -- CALCULATIONS: Body Surface Area: 2.05  Outputs:  -- CALCULATIONS: Height in cm: 175.00  Outputs:  -- CALCULATIONS: Sex: Male  Outputs:  -- CALCULATIONS: Weight in k.40 (17 @ 21:55)    ======================================================  PAST MEDICAL & SURGICAL HISTORY:  HTN (hypertension)      CAD (coronary artery disease)  ; stent      Intracranial hemorrhage        Respiratory arrest        Myocardial infarction, unspecified MI type, unspecified artery      History of coronary artery stent placement        ====================ASSESSMENT ==============  58 male with HTN, CAD (s/p PCI ), HFrEF, CVA , and T2DM presenting with chest pressure and unknown tachycardia that was shocked x1, [facility]  found to have in-stent restenosis of pLAD and  of RCA with elevated RA and PA pressures and severely decreased. Admitted to CICU for management of cardiogenic shock and ADHF requiring IABP  -, with hospital course c/b vfib arrest requiring reinsertion of IABP. Not recommended for ATs per HF. Currently listed for transplant status 2.    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 AT evaluation launched 11/10, he is ABO A. He was discussed during TSC on  and was approved for listing, however was found to be Bacteremic with  Blc Cx growing mixed cultures Staph epi and Enterococcus faecalis and started on IV Vanco. Repeat cultures from  reveal NGTD and therefore was cleared by ID for listing.     He appears adequately supported on IABP at 1:1, electrically quiescent on oral Amiodarone. Cr is improving with holding diuretics. Labs otherwise notable for worsening thrombocytopenia. Awaiting suitable donor. Now with GM + rods in blood culture on  (reported on ), restarted on vancomycin, and status 7, repeat cultures now negative x48 hours (), now status 2 again.       ====================== NEUROLOGY=====================  Anxiety  - No Seroquel/antipsychotics since vfib arrest and prolonged QTC  - Psych Eval, recommended SSRI, but pt. refused   - Psych recommending atarax PRN  - Continue to monitor mental status    PT/Conditioning  - Continue band exercises while on bedrest s/t IABP    ==================== RESPIRATORY======================  Acute Hypoxemic Respiratory Failure  - s/p x2 intubations for cardiogenic pulm edema and the in setting of cardiac arrest, resolved - extubated 11/10  - Currently maintaining >95% sats on room air  - Continue incentive spirometry and monitoring of sp02    Asthma  - c/w symbicort  - On trelegy at home  - Continue to monitor SpO2 with goal >94%    ====================CARDIOVASCULAR==================  Vfib arrest i/s/o ischemia  - Lido gtt off   - PO Amio load - total of 5g per EP complete   - Amio dc'd  for rising LFTs  - Keep K > 4, Mag > 2.2     Cardiogenic shock requiring IABP (- , -)  - Likely 2/2 NSTEMI and ADHF  -  LHC: pLAD 100 % in-stent restenosis & mRCA, 100 %. PCWP 30. IABP placed.  -  TTE: LV dilated. EF 32 %. Regional WMAs present, mod (grade 2) LV diastolic dysfunction  -  TTE: EF 22% and + LV thrombus  - IABP swapped  to RFA, continue 1:1 support - switched sensor to R radial a-line  due to poor sensing on groin line  - Off Milrinone gtt @ 7:30 am   - James d/c'd due to elevated K levels  - c/w coreg 25 BID for GDMT  - UNOS status 2 as of   - hydralazine 50 TID and ISD 30 TID for AL reduction    NSTEMI iso stent re-occlusion of pLAD and 100%  of RCA  - EKG on admission w/ LBBB  - DAPT: c/w ASA, Brilinta d/c'd per transplant w/u  - c/w lipitor 80  - cMR deferred given necessity of IABP  - CT sx not recommending CABG, undergoing AT eval    LV thrombus  - c/w heparin gtt w/ therapeutic PTT    ===================== RENAL =========================  Non-oliguric ARIC, resolved   - Baseline Cr: 1-  - Renal US: no evidence of renal artery stenosis  - Trend BMP, lytes daily, replace as needed  - Continue Strict I/Os, avoid nephrotoxins    =============== GASTROINTESTINAL===================  Constipation/ileus, resolved  - regular diet  - bowel reg prn    ===================ENDO====================  Type 2 DM  - A1c 8.3   - Continue lantus, premeal, low ISS  - continue FS    ===================HEMATOLOGIC/ONC ===================  - H/H & plts stable  - Monitor H/H and plts  - VTE PPX: heparin gtt    ==================INFECTIOUS DISEASE================  Positive blood culture, resolved  - Repeat BCx drawn  for leukocytosis to 12k - positive on , G+ rods in anaerobic bottle, suspect contaminant  - Repeat cultures x2 sent  per transplant ID recs - no growth to date  - Vancomycin by trough (-)  - Final microbial ID: Cutibacterium acnes, per ID this is likely to be a skin contaminant, vanc dc'd.   - Dental eval  to rule out infectious etiology that would have led to bacteremia, no significant findings on exam.     Enterococcus faecalis bacteremia, resolved  - BCx + for enterococcus faecalis x2, Staph epi x1 (likely contaminant)- pan sensitive   - Urine cx  + enterococcus faecalis  - BCx  no growth   - IABP site swapped to RFA   - s/p Vancomycin 1g q12h (-)  - CT A/P negative for infectious pathology    COVID, resolved  - Off airborne precautions     Pre-transplant ID w/u   - Trend ID recs for serologies   - Colonoscopy  - normal   - Chest CT  - improved LLL aeration  - s/p immunizations    ==================DERMATOLOGY================   Upper Extremity Rash  - Patient developed bilateral upper extremity rash after receiving Hepatitis A & B immunizations on   - Dermatology consulted  - not likely to be related to vanco  - Recommend clobetasol 0.05% BID to affected areas   - RVP repeated to rule out viral etiology, negative    Left neck mass  - US soft tissue:  lateral left neck 2.0 x 0.7 x 1.4 cm heterogeneous complex solid and cystic appearing lesion in subcutaneous soft tissue extending into subjacent strap muscle  - vasc consult: LUE venous duplex US ordered    Lines: IABP (), RRA ()      Patient requires continuous monitoring with bedside rhythm monitoring, pulse ox monitoring, and intermittent blood gas analysis. Care plan discussed with ICU care team. Patient remained critical and at risk for life threatening decompensation.  Patient seen, examined and plan discussed with CCU team during rounds.     I have personally provided ____ minutes of critical care time excluding time spent on separate procedures, in addition to initial critical care time provided by the CICU Attending, Dr. Durand.      By signing my name below, I, Rosalba Tapia, attest that this documentation has been prepared under the direction and in the presence of Kitty Braden NP   Electronically signed: Rosalba Phillips, 23 @ 20:43    I, Kitty Braden, personally performed the services described in this documentation. all medical record entries made by the scribe were at my direction and in my presence. I have reviewed the chart and agree that the record reflects my personal performance and is accurate and complete  Electronically signed: Kitty Braden NP        DEVORAH VALENCIA  MRN-[id number]  Patient is a 58y old  Male who presents with a chief complaint of heart failure (20 Dec 2023 19:27)    HPI:  pt seen and approx 1:30 pm  in [facility]    59yo M w/ hx HTN, CAD w/ 1 stent in , ICH () presenting with abn ekg. Patient presented to [facility] where he was found to have STEMI, recommended to get cath however patient did not want to get it there so it left and came here.  Patient initially had cough, congestion, fever, was placed on antibiotics on .  Started feeling nauseous and had a presyncopal event after which he presented to ED last night.  Had chest pain as well.  Chest pain is midsternal.  Not currently having chest pain.  Received 4 aspirin 30 min pta. (2023 15:11)    24 HOUR EVENTS: no acute events    REVIEW OF SYSTEMS:  CONSTITUTIONAL: No weakness, fevers or chills  EYES/ENT: No visual changes;  No vertigo or throat pain   NECK: No pain or stiffness  RESPIRATORY: No cough, wheezing, hemoptysis; No shortness of breath  CARDIOVASCULAR: No chest pain or palpitations  GASTROINTESTINAL: No abdominal or epigastric pain. No nausea, vomiting, or hematemesis; No diarrhea or constipation. No melena or hematochezia.  GENITOURINARY: No dysuria, frequency or hematuria  NEUROLOGICAL: No numbness or weakness  SKIN: No itching, rashes      ICU Vital Signs Last 24 Hrs  T(C): 36.9 (20 Dec 2023 19:00), Max: 36.9 (20 Dec 2023 15:00)  T(F): 98.5 (20 Dec 2023 19:00), Max: 98.5 (20 Dec 2023 15:00)  HR: 80 (20 Dec 2023 20:00) (72 - 91)  BP: --  BP(mean): --  ABP: --  ABP(mean): --  RR: 14 (20 Dec 2023 20:00) (12 - 32)  SpO2: 97% (20 Dec 2023 20:00) (94% - 98%)    O2 Parameters below as of 20 Dec 2023 20:00  Patient On (Oxygen Delivery Method): room air      I&O's Summary    19 Dec 2023 07:01  -  20 Dec 2023 07:00  --------------------------------------------------------  IN: 1056 mL / OUT: 1400 mL / NET: -344 mL    20 Dec 2023 07:01  -  20 Dec 2023 20:43  --------------------------------------------------------  IN: 662 mL / OUT: 700 mL / NET: -38 mL      CAPILLARY BLOOD GLUCOSE    POCT Blood Glucose.: 177 mg/dL (20 Dec 2023 17:05)      PHYSICAL EXAM:  GENERAL: in no acute distress, well-developed  HEENT:  Atraumatic, Normocephalic, PERRLA, conjunctiva and sclera clear  CHEST/LUNG: CTAB; No wheezes, rales, or rhonchi  HEART: Regular rate and rhythm. Normal S1/S2. No murmurs, rubs, or gallops  ABDOMEN: Soft, non-tender, non-distended; normal bowel sounds  EXTREMITIES:  2+ peripheral pulses b/l, No clubbing, cyanosis, or edema  NEUROLOGY: A&O x 3, no focal deficits  SKIN: No rashes or lesions  Lines: R Femoral IABP c/d/i    ============================I/O===========================   I&O's Detail    19 Dec 2023 07:01  -  20 Dec 2023 07:00  --------------------------------------------------------  IN:    Heparin: 336 mL    Oral Fluid: 720 mL  Total IN: 1056 mL    OUT:    Voided (mL): 1400 mL  Total OUT: 1400 mL    Total NET: -344 mL      20 Dec 2023 07:01  -  20 Dec 2023 20:43  --------------------------------------------------------  IN:    Heparin: 182 mL    Oral Fluid: 480 mL  Total IN: 662 mL    OUT:    Voided (mL): 700 mL  Total OUT: 700 mL    Total NET: -38 mL        ============================ LABS =========================                        11.8   8.24  )-----------( 193      ( 19 Dec 2023 01:56 )             35.7     -    132<L>  |  104  |  30<H>  ----------------------------<  141<H>  4.1   |  17<L>  |  1.03    Ca    9.7      19 Dec 2023 01:56  Phos  3.6     12-  Mg     1.8         TPro  6.7  /  Alb  3.8  /  TBili  0.3  /  DBili  x   /  AST  27  /  ALT  75<H>  /  AlkPhos  60      LIVER FUNCTIONS - ( 19 Dec 2023 01:56 )  Alb: 3.8 g/dL / Pro: 6.7 g/dL / ALK PHOS: 60 U/L / ALT: 75 U/L / AST: 27 U/L / GGT: x           PT/INR - ( 19 Dec 2023 01:56 )   PT: 12.1 sec;   INR: 1.16 ratio         PTT - ( 19 Dec 2023 01:56 )  PTT:78.9 sec      Urinalysis Basic - ( 19 Dec 2023 01:56 )    Color: x / Appearance: x / SG: x / pH: x  Gluc: 141 mg/dL / Ketone: x  / Bili: x / Urobili: x   Blood: x / Protein: x / Nitrite: x   Leuk Esterase: x / RBC: x / WBC x   Sq Epi: x / Non Sq Epi: x / Bacteria: x      ======================Micro/Rad/Cardio=================  Telemtry: Reviewed   EKG: Reviewed  CXR: Reviewed  Culture: Reviewed   Echo:   Cath: Cardiac Cath Lab - Adult:   [facility] [place]  Department of Cardiology  [address] [place] [address] [phone]  Cath Lab Report -- Comprehensive Report  Patient: LD VALENCIA  Study date: 2017  Account number: [id number]  MR number: [id number]  : 1965  Gender: Male  Race: W  Case Physician(s):  Jairon Holguin M.D.  Referring Physician:  Luc Lynn M.D.  INDICATIONS: Unstable angina - CCS4.  HISTORY: The patient has a history of coronary artery disease. The patient  hashypertension and medication-treated dyslipidemia.  PROCEDURE:  --  Left heart catheterization with ventriculography.  --  Left coronary angiography.  --  Right coronary angiography.  TECHNIQUE: The risks and alternatives of the procedures and conscious  sedation were explained to the patient and informed consent was obtained.  Cardiac catheterization performed electively.  Local anesthetic given. Right radial artery access. A 6FR PRELUDE KIT was  inserted in the vessel. Left heart catheterization. Ventriculography was  performed. A 5FR FR4.0 EXPO catheter was utilized. Left coronary artery  angiography. The vessel was injected utilizing a 5FR FL3.5 EXPO catheter.  Right coronary artery angiography. The vessel was injected utilizing a 5FR  FR4.0 EXPO catheter. RADIATION EXPOSURE: 1.1 min.  CONTRAST GIVEN: Omnipaque 55 ml.  MEDICATIONS GIVEN: Midazolam, 1 mg, IV. Fentanyl, 25 mcg, IV. Verapamil  (Isoptin, Calan, Covera), 2.5 mg, IA. Heparin, 3000 units, IA.  VENTRICLES: Global left ventricular function was moderately depressed. EF  estimated was 40 %.  CORONARY VESSELS: The coronary circulation is right dominant.  LM:   --  LM: Normal.  LAD:   --  Proximal LAD: There was a 50 % stenosis.  CX:   --  Circumflex: Normal.  RCA:   --  Mid RCA: There was a 40 % stenosis.  --  Distal RCA: There was a 50 % stenosis.  COMPLICATIONS: There were no complications.  DIAGNOSTIC RECOMMENDATIONS: The patient should continue with the present  medications.  Prepared and signed by  Jairon Holguin M.D.  Signed 2017 12:20:13  HEMODYNAMIC TABLES  Pressures:  Baseline/ Room Air  Pressures:  - HR: 78  Pressures:  - Rhythm:  Pressures:  -- Aortic Pressure (S/D/M): --/--/99  Pressures:  -- Left Ventricle (s/edp): 157/39/--  Outputs:  Baseline/ Room Air  Outputs:  -- CALCULATIONS[age]  Outputs:  -- CALCULATIONS: Body Surface Area: 2.05  Outputs:  -- CALCULATIONS: Height in cm: 175.00  Outputs:  -- CALCULATIONS: Sex: Male  Outputs:  -- CALCULATIONS: Weight in k.40 (17 @ 21:55)      < from: VA Duplex Upper Ext Vein Scan, Left (23 @ 12:33) >  FINDINGS:    The left internal jugular, subclavian, axillary and brachial veins are   patent and compressible where applicable.  The basilic vein (superficial   vein) is patent and without thrombus.  The cephalic vein (superficial   vein) is thrombosed in the upper arm.    Doppler examination shows normal spontaneous and phasic flow.    Subcentimeter lymph nodes are present in the left neck and axilla.    IMPRESSION:  No evidence of left upper extremity deep venous thrombosis.    Superficial thrombosis of the cephalic vein in the left upper arm.      < end of copied text >      ======================================================  PAST MEDICAL & SURGICAL HISTORY:  HTN (hypertension)      CAD (coronary artery disease)  ; stent      Intracranial hemorrhage        Respiratory arrest        Myocardial infarction, unspecified MI type, unspecified artery      History of coronary artery stent placement        ====================ASSESSMENT ==============  58 male with HTN, CAD (s/p PCI ), HFrEF, CVA , and T2DM presenting with chest pressure and unknown tachycardia that was shocked x1, C  found to have in-stent restenosis of pLAD and  of RCA, admitted to CICU for management of cardiogenic shock and ADHF requiring IABP  -, with hospital course c/b vfib arrest requiring reinsertion of IABP, currently listed for transplant status 2.    ====================== NEUROLOGY=====================  Anxiety  - No Seroquel/antipsychotics since vfib arrest and prolonged QTC  - Psych Eval, recommended SSRI, but pt. refused   - Psych recommending atarax PRN  - Continue to monitor mental status    PT/Conditioning  - Continue band exercises while on bedrest s/t IABP    ==================== RESPIRATORY======================  Acute Hypoxemic Respiratory Failure  - s/p x2 intubations for cardiogenic pulm edema and the in setting of cardiac arrest, resolved - extubated 11/10  - Currently maintaining >95% sats on room air  - Continue incentive spirometry and monitoring of sp02    Asthma  - c/w symbicort  - On trelegy at home  - Continue to monitor SpO2 with goal >94%    ====================CARDIOVASCULAR==================  Vfib arrest i/s/o ischemia  - Lido gtt off   - PO Amio load - total of 5g per EP complete   - Amio dc'd  for rising LFTs  - Keep K > 4, Mag > 2.2     Cardiogenic shock requiring IABP (- , -)  - Likely 2/2 NSTEMI and ADHF  -  LHC: pLAD 100 % in-stent restenosis & mRCA, 100 %. PCWP 30. IABP placed.  -  TTE: LV dilated. EF 32 %. Regional WMAs present, mod (grade 2) LV diastolic dysfunction  -  TTE: EF 22% and + LV thrombus  - IABP swapped  to RFA, continue 1:1 support - switched sensor to R radial a-line  due to poor sensing on groin line  - Off Milrinone gtt @ 7:30 am   - hydralazine 50 TID and ISD 30 TID for AL reduction  - James d/c'd due to elevated K levels  - c/w coreg 25 BID for GDMT  - UNOS status 2 as of     NSTEMI iso stent re-occlusion of pLAD and 100%  of RCA  - EKG on admission w/ LBBB  - DAPT: c/w ASA, Brilinta d/c'd per transplant w/u  - c/w lipitor 80  - cMR deferred given necessity of IABP  - CT sx not recommending CABG, undergoing AT eval    LV thrombus  - c/w heparin gtt w/ therapeutic PTT    ===================== RENAL =========================  Non-oliguric ARIC, resolved   - Baseline Cr: 1-  - Renal US: no evidence of renal artery stenosis  - Trend BMP, lytes daily, replace as needed  - Continue Strict I/Os, avoid nephrotoxins    =============== GASTROINTESTINAL===================  Constipation/ileus, resolved  - regular diet  - bowel reg prn    ===================ENDO====================  Type 2 DM  - A1c 8.3, glucose controlled  - Continue lantus, premeal, low ISS  - continue FS monitoring    ===================HEMATOLOGIC/ONC ===================  - H/H & plts stable, no s/sx of bleeding  - Monitor H/H and plts q48h  - VTE PPX: heparin gtt    ==================INFECTIOUS DISEASE================  Positive blood culture, resolved  - Repeat BCx drawn  for leukocytosis to 12k - positive on , G+ rods in anaerobic bottle, suspect contaminant  - Repeat cultures x2 sent  per transplant ID recs - no growth to date  - Vancomycin by trough (-)  - Final microbial ID: Cutibacterium acnes, per ID this is likely to be a skin contaminant, vanc dc'd.   - Dental eval  to rule out infectious etiology that would have led to bacteremia, no significant findings on exam.     Enterococcus faecalis bacteremia, resolved  - BCx + for enterococcus faecalis x2, Staph epi x1 (likely contaminant)- pan sensitive   - Urine cx  + enterococcus faecalis  - BCx  no growth   - IABP site swapped to RFA   - s/p Vancomycin 1g q12h (-)  - CT A/P negative for infectious pathology    COVID, resolved  - Off airborne precautions     Pre-transplant ID w/u   - Trend ID recs for serologies   - Colonoscopy  - normal   - Chest CT  - improved LLL aeration  - s/p immunizations    ==================DERMATOLOGY================   Upper Extremity Rash  - Patient developed bilateral upper extremity rash after receiving Hepatitis A & B immunizations on   - Dermatology consulted  - not likely to be related to vanco  - Recommend clobetasol 0.05% BID to affected areas   - RVP repeated to rule out viral etiology, negative    Left neck mass  - US soft tissue:  lateral left neck 2.0 x 0.7 x 1.4 cm heterogeneous complex solid and cystic appearing lesion in subcutaneous soft tissue extending into subjacent strap muscle  - vasc cardiology consult: JOSE J venous duplex US ordered,  - Subcentimeter lymph nodes are present in the left neck and axilla, No evidence of left upper extremity DVT, Superficial thrombosis of the cephalic vein in the left upper arm.  - continue to monitor site    Lines: IABP (-), RRA (-)    ======================= DISPOSITION  =====================  [X] Critical   [ ] Guarded    [ ] Stable    [X] Maintain in CICU  [ ] Downgrade to Telemtry  [ ] Discharge Home      Patient requires continuous monitoring with bedside rhythm monitoring, pulse ox monitoring, and intermittent blood gas analysis. Care plan discussed with ICU care team. Patient remained critical and at risk for life threatening decompensation.  Patient seen, examined and plan discussed with CCU team during rounds.     I have personally provided 30 minutes of critical care time excluding time spent on separate procedures, in addition to initial critical care time provided by the CICU Attending, Dr. Durand.      By signing my name below, I, Rosalba Tapia, attest that this documentation has been prepared under the direction and in the presence of Kitty Braden NP   Electronically signed: Rosalba Phillips, 23 @ 20:43    I, Kitty Braden, personally performed the services described in this documentation. all medical record entries made by the scribe were at my direction and in my presence. I have reviewed the chart and agree that the record reflects my personal performance and is accurate and complete  Electronically signed: Kitty Braden NP        DEVORAH VALENCIA  MRN-[id number]  Patient is a 58y old  Male who presents with a chief complaint of heart failure (20 Dec 2023 19:27)    HPI:  pt seen and approx 1:30 pm  in [facility]    59yo M w/ hx HTN, CAD w/ 1 stent in , ICH () presenting with abn ekg. Patient presented to [facility] where he was found to have STEMI, recommended to get cath however patient did not want to get it there so it left and came here.  Patient initially had cough, congestion, fever, was placed on antibiotics on .  Started feeling nauseous and had a presyncopal event after which he presented to ED last night.  Had chest pain as well.  Chest pain is midsternal.  Not currently having chest pain.  Received 4 aspirin 30 min pta. (2023 15:11)    24 HOUR EVENTS: no acute events    REVIEW OF SYSTEMS:  CONSTITUTIONAL: No weakness, fevers or chills  EYES/ENT: No visual changes;  No vertigo or throat pain   NECK: No pain or stiffness  RESPIRATORY: No cough, wheezing, hemoptysis; No shortness of breath  CARDIOVASCULAR: No chest pain or palpitations  GASTROINTESTINAL: No abdominal or epigastric pain. No nausea, vomiting, or hematemesis; No diarrhea or constipation. No melena or hematochezia.  GENITOURINARY: No dysuria, frequency or hematuria  NEUROLOGICAL: No numbness or weakness  SKIN: No itching, rashes      ICU Vital Signs Last 24 Hrs  T(C): 36.9 (20 Dec 2023 19:00), Max: 36.9 (20 Dec 2023 15:00)  T(F): 98.5 (20 Dec 2023 19:00), Max: 98.5 (20 Dec 2023 15:00)  HR: 80 (20 Dec 2023 20:00) (72 - 91)  BP: --  BP(mean): --  ABP: --  ABP(mean): --  RR: 14 (20 Dec 2023 20:00) (12 - 32)  SpO2: 97% (20 Dec 2023 20:00) (94% - 98%)    O2 Parameters below as of 20 Dec 2023 20:00  Patient On (Oxygen Delivery Method): room air      I&O's Summary    19 Dec 2023 07:01  -  20 Dec 2023 07:00  --------------------------------------------------------  IN: 1056 mL / OUT: 1400 mL / NET: -344 mL    20 Dec 2023 07:01  -  20 Dec 2023 20:43  --------------------------------------------------------  IN: 662 mL / OUT: 700 mL / NET: -38 mL      CAPILLARY BLOOD GLUCOSE    POCT Blood Glucose.: 177 mg/dL (20 Dec 2023 17:05)      PHYSICAL EXAM:  GENERAL: in no acute distress, well-developed  HEENT:  Atraumatic, Normocephalic, PERRLA, conjunctiva and sclera clear  CHEST/LUNG: CTAB; No wheezes, rales, or rhonchi  HEART: Regular rate and rhythm. Normal S1/S2. No murmurs, rubs, or gallops  ABDOMEN: Soft, non-tender, non-distended; normal bowel sounds  EXTREMITIES:  2+ peripheral pulses b/l, No clubbing, cyanosis, or edema  NEUROLOGY: A&O x 3, no focal deficits  SKIN: No rashes or lesions  Lines: R Femoral IABP c/d/i    ============================I/O===========================   I&O's Detail    19 Dec 2023 07:01  -  20 Dec 2023 07:00  --------------------------------------------------------  IN:    Heparin: 336 mL    Oral Fluid: 720 mL  Total IN: 1056 mL    OUT:    Voided (mL): 1400 mL  Total OUT: 1400 mL    Total NET: -344 mL      20 Dec 2023 07:01  -  20 Dec 2023 20:43  --------------------------------------------------------  IN:    Heparin: 182 mL    Oral Fluid: 480 mL  Total IN: 662 mL    OUT:    Voided (mL): 700 mL  Total OUT: 700 mL    Total NET: -38 mL        ============================ LABS =========================                        11.8   8.24  )-----------( 193      ( 19 Dec 2023 01:56 )             35.7     -    132<L>  |  104  |  30<H>  ----------------------------<  141<H>  4.1   |  17<L>  |  1.03    Ca    9.7      19 Dec 2023 01:56  Phos  3.6     12-  Mg     1.8         TPro  6.7  /  Alb  3.8  /  TBili  0.3  /  DBili  x   /  AST  27  /  ALT  75<H>  /  AlkPhos  60      LIVER FUNCTIONS - ( 19 Dec 2023 01:56 )  Alb: 3.8 g/dL / Pro: 6.7 g/dL / ALK PHOS: 60 U/L / ALT: 75 U/L / AST: 27 U/L / GGT: x           PT/INR - ( 19 Dec 2023 01:56 )   PT: 12.1 sec;   INR: 1.16 ratio         PTT - ( 19 Dec 2023 01:56 )  PTT:78.9 sec      Urinalysis Basic - ( 19 Dec 2023 01:56 )    Color: x / Appearance: x / SG: x / pH: x  Gluc: 141 mg/dL / Ketone: x  / Bili: x / Urobili: x   Blood: x / Protein: x / Nitrite: x   Leuk Esterase: x / RBC: x / WBC x   Sq Epi: x / Non Sq Epi: x / Bacteria: x      ======================Micro/Rad/Cardio=================  Telemtry: Reviewed   EKG: Reviewed  CXR: Reviewed  Culture: Reviewed   Echo:   Cath: Cardiac Cath Lab - Adult:   [facility]  Department of Cardiology  [address] [place] [address] [phone]  Cath Lab Report -- Comprehensive Report  Patient: LD VALENCIA  Study date: 2017  Account number: [id number]  MR number: [id number]  : 1965  Gender: Male  Race: W  Case Physician(s):  Jairon Holguin M.D.  Referring Physician:  Luc Lynn M.D.  INDICATIONS: Unstable angina - CCS4.  HISTORY: The patient has a history of coronary artery disease. The patient  hashypertension and medication-treated dyslipidemia.  PROCEDURE:  --  Left heart catheterization with ventriculography.  --  Left coronary angiography.  --  Right coronary angiography.  TECHNIQUE: The risks and alternatives of the procedures and conscious  sedation were explained to the patient and informed consent was obtained.  Cardiac catheterization performed electively.  Local anesthetic given. Right radial artery access. A 6FR PRELUDE KIT was  inserted in the vessel. Left heart catheterization. Ventriculography was  performed. A 5FR FR4.0 EXPO catheter was utilized. Left coronary artery  angiography. The vessel was injected utilizing a 5FR FL3.5 EXPO catheter.  Right coronary artery angiography. The vessel was injected utilizing a 5FR  FR4.0 EXPO catheter. RADIATION EXPOSURE: 1.1 min.  CONTRAST GIVEN: Omnipaque 55 ml.  MEDICATIONS GIVEN: Midazolam, 1 mg, IV. Fentanyl, 25 mcg, IV. Verapamil  (Isoptin, Calan, Covera), 2.5 mg, IA. Heparin, 3000 units, IA.  VENTRICLES: Global left ventricular function was moderately depressed. EF  estimated was 40 %.  CORONARY VESSELS: The coronary circulation is right dominant.  LM:   --  LM: Normal.  LAD:   --  Proximal LAD: There was a 50 % stenosis.  CX:   --  Circumflex: Normal.  RCA:   --  Mid RCA: There was a 40 % stenosis.  --  Distal RCA: There was a 50 % stenosis.  COMPLICATIONS: There were no complications.  DIAGNOSTIC RECOMMENDATIONS: The patient should continue with the present  medications.  Prepared and signed by  Jairon Holguin M.D.  Signed 2017 12:20:13  HEMODYNAMIC TABLES  Pressures:  Baseline/ Room Air  Pressures:  - HR: 78  Pressures:  - Rhythm:  Pressures:  -- Aortic Pressure (S/D/M): --/--/99  Pressures:  -- Left Ventricle (s/edp): 157/39/--  Outputs:  Baseline/ Room Air  Outputs:  -- CALCULATIONS[age]  Outputs:  -- CALCULATIONS: Body Surface Area: 2.05  Outputs:  -- CALCULATIONS: Height in cm: 175.00  Outputs:  -- CALCULATIONS: Sex: Male  Outputs:  -- CALCULATIONS: Weight in k.40 (17 @ 21:55)      < from: [facility] Duplex Upper Ext Vein Scan, Left (23 @ 12:33) >  FINDINGS:    The left internal jugular, subclavian, axillary and brachial veins are   patent and compressible where applicable.  The basilic vein (superficial   vein) is patent and without thrombus.  The cephalic vein (superficial   vein) is thrombosed in the upper arm.    Doppler examination shows normal spontaneous and phasic flow.    Subcentimeter lymph nodes are present in the left neck and axilla.    IMPRESSION:  No evidence of left upper extremity deep venous thrombosis.    Superficial thrombosis of the cephalic vein in the left upper arm.      < end of copied text >      ======================================================  PAST MEDICAL & SURGICAL HISTORY:  HTN (hypertension)      CAD (coronary artery disease)  ; stent      Intracranial hemorrhage        Respiratory arrest        Myocardial infarction, unspecified MI type, unspecified artery      History of coronary artery stent placement        ====================ASSESSMENT ==============  58 male with HTN, CAD (s/p PCI ), HFrEF, CVA , and T2DM presenting with chest pressure and unknown tachycardia that was shocked x1, C  found to have in-stent restenosis of pLAD and  of RCA, admitted to CICU for management of cardiogenic shock and ADHF requiring IABP  -, with hospital course c/b vfib arrest requiring reinsertion of IABP, currently listed for transplant status 2.    ====================== NEUROLOGY=====================  Anxiety  - No Seroquel/antipsychotics since vfib arrest and prolonged QTC  - Psych Eval, recommended SSRI, but pt. refused   - Psych recommending atarax PRN  - Continue to monitor mental status    PT/Conditioning  - Continue band exercises while on bedrest s/t IABP    ==================== RESPIRATORY======================  Acute Hypoxemic Respiratory Failure  - s/p x2 intubations for cardiogenic pulm edema and the in setting of cardiac arrest, resolved - extubated 11/10  - Currently maintaining >95% sats on room air  - Continue incentive spirometry and monitoring of sp02    Asthma  - c/w symbicort  - On trelegy at home  - Continue to monitor SpO2 with goal >94%    ====================CARDIOVASCULAR==================  Vfib arrest i/s/o ischemia  - Lido gtt off   - PO Amio load - total of 5g per EP complete   - Amio dc'd  for rising LFTs  - Keep K > 4, Mag > 2.2     Cardiogenic shock requiring IABP (- , -)  - Likely 2/2 NSTEMI and ADHF  -  LHC: pLAD 100 % in-stent restenosis & mRCA, 100 %. PCWP 30. IABP placed.  -  TTE: LV dilated. EF 32 %. Regional WMAs present, mod (grade 2) LV diastolic dysfunction  -  TTE: EF 22% and + LV thrombus  - IABP swapped  to RFA, continue 1:1 support - switched sensor to R radial a-line  due to poor sensing on groin line  - Off Milrinone gtt @ 7:30 am   - hydralazine 50 TID and ISD 30 TID for AL reduction  - James d/c'd due to elevated K levels  - c/w coreg 25 BID for GDMT  - UNOS status 2 as of     NSTEMI iso stent re-occlusion of pLAD and 100%  of RCA  - EKG on admission w/ LBBB  - DAPT: c/w ASA, Brilinta d/c'd per transplant w/u  - c/w lipitor 80  - cMR deferred given necessity of IABP  - CT sx not recommending CABG, undergoing AT eval    LV thrombus  - c/w heparin gtt w/ therapeutic PTT    ===================== RENAL =========================  Non-oliguric ARIC, resolved   - Baseline Cr: 1-  - Renal US: no evidence of renal artery stenosis  - Trend BMP, lytes daily, replace as needed  - Continue Strict I/Os, avoid nephrotoxins    =============== GASTROINTESTINAL===================  Constipation/ileus, resolved  - regular diet  - bowel reg prn    ===================ENDO====================  Type 2 DM  - A1c 8.3, glucose controlled  - Continue lantus, premeal, low ISS  - continue FS monitoring    ===================HEMATOLOGIC/ONC ===================  - H/H & plts stable, no s/sx of bleeding  - Monitor H/H and plts q48h  - VTE PPX: heparin gtt    ==================INFECTIOUS DISEASE================  Positive blood culture, resolved  - Repeat BCx drawn  for leukocytosis to 12k - positive on , G+ rods in anaerobic bottle, suspect contaminant  - Repeat cultures x2 sent  per transplant ID recs - no growth to date  - Vancomycin by trough (-)  - Final microbial ID: Cutibacterium acnes, per ID this is likely to be a skin contaminant, vanc dc'd.   - Dental eval  to rule out infectious etiology that would have led to bacteremia, no significant findings on exam.     Enterococcus faecalis bacteremia, resolved  - BCx + for enterococcus faecalis x2, Staph epi x1 (likely contaminant)- pan sensitive   - Urine cx  + enterococcus faecalis  - BCx  no growth   - IABP site swapped to RFA   - s/p Vancomycin 1g q12h (-)  - CT A/P negative for infectious pathology    COVID, resolved  - Off airborne precautions     Pre-transplant ID w/u   - Trend ID recs for serologies   - Colonoscopy  - normal   - Chest CT  - improved LLL aeration  - s/p immunizations    ==================DERMATOLOGY================   Upper Extremity Rash  - Patient developed bilateral upper extremity rash after receiving Hepatitis A & B immunizations on   - Dermatology consulted  - not likely to be related to vanco  - Recommend clobetasol 0.05% BID to affected areas   - RVP repeated to rule out viral etiology, negative    Left neck mass  - US soft tissue:  lateral left neck 2.0 x 0.7 x 1.4 cm heterogeneous complex solid and cystic appearing lesion in subcutaneous soft tissue extending into subjacent strap muscle  - vasc cardiology consult: JOSE J venous duplex US ordered,  - Subcentimeter lymph nodes are present in the left neck and axilla, No evidence of left upper extremity DVT, Superficial thrombosis of the cephalic vein in the left upper arm.  - continue to monitor site    Lines: IABP (-), RRA (-)    ======================= DISPOSITION  =====================  [X] Critical   [ ] Guarded    [ ] Stable    [X] Maintain in CICU  [ ] Downgrade to Telemtry  [ ] Discharge Home      Patient requires continuous monitoring with bedside rhythm monitoring, pulse ox monitoring, and intermittent blood gas analysis. Care plan discussed with ICU care team. Patient remained critical and at risk for life threatening decompensation.  Patient seen, examined and plan discussed with CCU team during rounds.     I have personally provided 30 minutes of critical care time excluding time spent on separate procedures, in addition to initial critical care time provided by the CICU Attending, Dr. Durand.      By signing my name below, I, Rosalba Tapia, attest that this documentation has been prepared under the direction and in the presence of Kitty Braden NP   Electronically signed: Rosalba Phillips, 23 @ 20:43    I, Kitty Braden, personally performed the services described in this documentation. all medical record entries made by the scribe were at my direction and in my presence. I have reviewed the chart and agree that the record reflects my personal performance and is accurate and complete  Electronically signed: Kitty Braden NP

## 2023-12-20 NOTE — PROGRESS NOTE ADULT - SUBJECTIVE AND OBJECTIVE BOX
ADVANCED HEART FAILURE & TRANSPLANT  - PROGRESS NOTE  *To reach the NS2 Team from 8am to 5pm, please call [phone]   ___________________________________________________________________________  Subjective:    Medications:  aspirin  chewable 81 milliGRAM(s) Oral daily  atorvastatin 80 milliGRAM(s) Oral at bedtime  bisacodyl 5 milliGRAM(s) Oral every 12 hours  budesonide  80 MICROgram(s)/formoterol 4.5 MICROgram(s) Inhaler 2 Puff(s) Inhalation two times a day  carvedilol 25 milliGRAM(s) Oral every 12 hours  chlorhexidine 2% Cloths 1 Application(s) Topical daily  heparin  Infusion 1300 Unit(s)/Hr IV Continuous <Continuous>  hydrALAZINE 50 milliGRAM(s) Oral every 8 hours  hydrOXYzine hydrochloride 25 milliGRAM(s) Oral two times a day PRN  insulin glargine Injectable (LANTUS) 12 Unit(s) SubCutaneous at bedtime  insulin lispro (ADMELOG) corrective regimen sliding scale   SubCutaneous three times a day before meals  insulin lispro (ADMELOG) corrective regimen sliding scale   SubCutaneous at bedtime  insulin lispro Injectable (ADMELOG) 9 Unit(s) SubCutaneous three times a day before meals  isosorbide   dinitrate Tablet (ISORDIL) 30 milliGRAM(s) Oral three times a day  polyethylene glycol 3350 17 Gram(s) Oral two times a day  senna 2 Tablet(s) Oral at bedtime      Physical Exam:    Vitals:  Vital Signs Last 24 Hours  T(C): 36.8 (12-20-23 @ 07:00), Max: 36.8 (12-19-23 @ 19:00)  HR: 76 (12-20-23 @ 07:00) (71 - 85)  BP: --  RR: 18 (12-20-23 @ 07:00) (12 - 23)  SpO2: 95% (12-20-23 @ 07:00) (94% - 96%)        I&O's Summary    19 Dec 2023 07:01  -  20 Dec 2023 07:00  --------------------------------------------------------  IN: 1056 mL / OUT: 1400 mL / NET: -344 mL    20 Dec 2023 07:01  -  20 Dec 2023 08:42  --------------------------------------------------------  IN: 14 mL / OUT: 0 mL / NET: 14 mL        Tele:    General: No distress. Comfortable.  HEENT: EOM intact.  Neck: Neck supple. JVP not elevated. No masses  Chest: Clear to auscultation bilaterally  CV: Normal S1 and S2. No murmurs, rub, or gallops. Radial pulses normal.  Abdomen: Soft, non-distended, non-tender  Skin: No rashes or skin breakdown  Neurology: Alert and oriented times three. Sensation intact  Psych: Affect normal    Labs:                        11.8   8.24  )-----------( 193      ( 19 Dec 2023 01:56 )             35.7     12-19    132<L>  |  104  |  30<H>  ----------------------------<  141<H>  4.1   |  17<L>  |  1.03    Ca    9.7      19 Dec 2023 01:56  Phos  3.6     12-19  Mg     1.8     12-19    TPro  6.7  /  Alb  3.8  /  TBili  0.3  /  DBili  x   /  AST  27  /  ALT  75<H>  /  AlkPhos  60  12-19    PT/INR - ( 19 Dec 2023 01:56 )   PT: 12.1 sec;   INR: 1.16 ratio         PTT - ( 19 Dec 2023 01:56 )  PTT:78.9 sec               ADVANCED HEART FAILURE & TRANSPLANT  - PROGRESS NOTE  *To reach the NS2 Team from 8am to 5pm, please call [phone]   ___________________________________________________________________________  Subjective:    Medications:  aspirin  chewable 81 milliGRAM(s) Oral daily  atorvastatin 80 milliGRAM(s) Oral at bedtime  bisacodyl 5 milliGRAM(s) Oral every 12 hours  budesonide  80 MICROgram(s)/formoterol 4.5 MICROgram(s) Inhaler 2 Puff(s) Inhalation two times a day  carvedilol 25 milliGRAM(s) Oral every 12 hours  chlorhexidine 2% Cloths 1 Application(s) Topical daily  heparin  Infusion 1300 Unit(s)/Hr IV Continuous <Continuous>  hydrALAZINE 50 milliGRAM(s) Oral every 8 hours  hydrOXYzine hydrochloride 25 milliGRAM(s) Oral two times a day PRN  insulin glargine Injectable (LANTUS) 12 Unit(s) SubCutaneous at bedtime  insulin lispro (ADMELOG) corrective regimen sliding scale   SubCutaneous three times a day before meals  insulin lispro (ADMELOG) corrective regimen sliding scale   SubCutaneous at bedtime  insulin lispro Injectable (ADMELOG) 9 Unit(s) SubCutaneous three times a day before meals  isosorbide   dinitrate Tablet (ISORDIL) 30 milliGRAM(s) Oral three times a day  polyethylene glycol 3350 17 Gram(s) Oral two times a day  senna 2 Tablet(s) Oral at bedtime      Physical Exam:    Vitals:  Vital Signs Last 24 Hours  T(C): 36.8 (12-20-23 @ 07:00), Max: 36.8 (12-19-23 @ 19:00)  HR: 76 (12-20-23 @ 07:00) (71 - 85)  BP: --  RR: 18 (12-20-23 @ 07:00) (12 - 23)  SpO2: 95% (12-20-23 @ 07:00) (94% - 96%)        I&O's Summary    19 Dec 2023 07:01  -  20 Dec 2023 07:00  --------------------------------------------------------  IN: 1056 mL / OUT: 1400 mL / NET: -344 mL    20 Dec 2023 07:01  -  20 Dec 2023 08:42  --------------------------------------------------------  IN: 14 mL / OUT: 0 mL / NET: 14 mL        Tele:    General: No distress. Comfortable.  HEENT: EOM intact.  Neck: Neck supple. JVP not elevated. No masses  Chest: Clear to auscultation bilaterally  CV: Normal S1 and S2. No murmurs, rub, or gallops. Radial pulses normal.  Abdomen: Soft, non-distended, non-tender  Skin: No rashes or skin breakdown  Neurology: Alert and oriented times three. Sensation intact  Psych: Affect normal    Labs:                        11.8   8.24  )-----------( 193      ( 19 Dec 2023 01:56 )             35.7     12-19    132<L>  |  104  |  30<H>  ----------------------------<  141<H>  4.1   |  17<L>  |  1.03    Ca    9.7      19 Dec 2023 01:56  Phos  3.6     12-19  Mg     1.8     12-19    TPro  6.7  /  Alb  3.8  /  TBili  0.3  /  DBili  x   /  AST  27  /  ALT  75<H>  /  AlkPhos  60  12-19    PT/INR - ( 19 Dec 2023 01:56 )   PT: 12.1 sec;   INR: 1.16 ratio         PTT - ( 19 Dec 2023 01:56 )  PTT:78.9 sec               ADVANCED HEART FAILURE & TRANSPLANT  - PROGRESS NOTE  *To reach the NS2 Team from 8am to 5pm, please call [phone]   ___________________________________________________________________________  Subjective:  - no acute events overnight  - doing PT in bed with resistance bands  - denies SOB, abdominal pain, CP.   - L neck ultrasound done    Medications:  aspirin  chewable 81 milliGRAM(s) Oral daily  atorvastatin 80 milliGRAM(s) Oral at bedtime  bisacodyl 5 milliGRAM(s) Oral every 12 hours  budesonide  80 MICROgram(s)/formoterol 4.5 MICROgram(s) Inhaler 2 Puff(s) Inhalation two times a day  carvedilol 25 milliGRAM(s) Oral every 12 hours  chlorhexidine 2% Cloths 1 Application(s) Topical daily  heparin  Infusion 1300 Unit(s)/Hr IV Continuous <Continuous>  hydrALAZINE 50 milliGRAM(s) Oral every 8 hours  hydrOXYzine hydrochloride 25 milliGRAM(s) Oral two times a day PRN  insulin glargine Injectable (LANTUS) 12 Unit(s) SubCutaneous at bedtime  insulin lispro (ADMELOG) corrective regimen sliding scale   SubCutaneous three times a day before meals  insulin lispro (ADMELOG) corrective regimen sliding scale   SubCutaneous at bedtime  insulin lispro Injectable (ADMELOG) 9 Unit(s) SubCutaneous three times a day before meals  isosorbide   dinitrate Tablet (ISORDIL) 30 milliGRAM(s) Oral three times a day  polyethylene glycol 3350 17 Gram(s) Oral two times a day  senna 2 Tablet(s) Oral at bedtime      Physical Exam:    Vitals:  Vital Signs Last 24 Hours  T(C): 36.8 (12-20-23 @ 07:00), Max: 36.8 (12-19-23 @ 19:00)  HR: 76 (12-20-23 @ 07:00) (71 - 85)  BP: IABP 1:1 assisted mean 82-90, aug diastolics 100-118  RR: 18 (12-20-23 @ 07:00) (12 - 23)  SpO2: 95% (12-20-23 @ 07:00) (94% - 96%)        I&O's Summary    19 Dec 2023 07:01  -  20 Dec 2023 07:00  --------------------------------------------------------  IN: 1056 mL / OUT: 1400 mL / NET: -344 mL    20 Dec 2023 07:01  -  20 Dec 2023 08:42  --------------------------------------------------------  IN: 14 mL / OUT: 0 mL / NET: 14 mL    Tele: SR     General: No distress. Comfortable.  HEENT: EOM intact.  Neck: Neck supple. JVP not elevated. nodule on left neck, non fluctuant  Chest: Clear to auscultation bilaterally  CV: Normal S1 and S2. No murmurs, rub, or gallops. Radial pulses normal. palpable R PT and L DP  Abdomen: Soft, non-distended, non-tender  Skin: No rashes or skin breakdown warm peripherally. R fem IABP site soft without hematoma  Neurology: Alert and oriented times three. Sensation intact  Psych: Affect normal    Labs:                        11.8   8.24  )-----------( 193      ( 19 Dec 2023 01:56 )             35.7     12-19    132<L>  |  104  |  30<H>  ----------------------------<  141<H>  4.1   |  17<L>  |  1.03    Ca    9.7      19 Dec 2023 01:56  Phos  3.6     12-19  Mg     1.8     12-19    TPro  6.7  /  Alb  3.8  /  TBili  0.3  /  DBili  x   /  AST  27  /  ALT  75<H>  /  AlkPhos  60  12-19    PT/INR - ( 19 Dec 2023 01:56 )   PT: 12.1 sec;   INR: 1.16 ratio         PTT - ( 19 Dec 2023 01:56 )  PTT:78.9 sec               ADVANCED HEART FAILURE & TRANSPLANT  - PROGRESS NOTE  *To reach the NS2 Team from 8am to 5pm, please call [phone]   ___________________________________________________________________________  Subjective:  - no acute events overnight  - doing PT in bed with resistance bands  - denies SOB, abdominal pain, CP.   - L neck ultrasound done    Medications:  aspirin  chewable 81 milliGRAM(s) Oral daily  atorvastatin 80 milliGRAM(s) Oral at bedtime  bisacodyl 5 milliGRAM(s) Oral every 12 hours  budesonide  80 MICROgram(s)/formoterol 4.5 MICROgram(s) Inhaler 2 Puff(s) Inhalation two times a day  carvedilol 25 milliGRAM(s) Oral every 12 hours  chlorhexidine 2% Cloths 1 Application(s) Topical daily  heparin  Infusion 1300 Unit(s)/Hr IV Continuous <Continuous>  hydrALAZINE 50 milliGRAM(s) Oral every 8 hours  hydrOXYzine hydrochloride 25 milliGRAM(s) Oral two times a day PRN  insulin glargine Injectable (LANTUS) 12 Unit(s) SubCutaneous at bedtime  insulin lispro (ADMELOG) corrective regimen sliding scale   SubCutaneous three times a day before meals  insulin lispro (ADMELOG) corrective regimen sliding scale   SubCutaneous at bedtime  insulin lispro Injectable (ADMELOG) 9 Unit(s) SubCutaneous three times a day before meals  isosorbide   dinitrate Tablet (ISORDIL) 30 milliGRAM(s) Oral three times a day  polyethylene glycol 3350 17 Gram(s) Oral two times a day  senna 2 Tablet(s) Oral at bedtime      Physical Exam:    Vitals:  Vital Signs Last 24 Hours  T(C): 36.8 (12-20-23 @ 07:00), Max: 36.8 (12-19-23 @ 19:00)  HR: 76 (12-20-23 @ 07:00) (71 - 85)  BP: IABP 1:1 assisted mean 82-90, aug diastolics 100-118  RR: 18 (12-20-23 @ 07:00) (12 - 23)  SpO2: 95% (12-20-23 @ 07:00) (94% - 96%)        I&O's Summary    19 Dec 2023 07:01  -  20 Dec 2023 07:00  --------------------------------------------------------  IN: 1056 mL / OUT: 1400 mL / NET: -344 mL    20 Dec 2023 07:01  -  20 Dec 2023 08:42  --------------------------------------------------------  IN: 14 mL / OUT: 0 mL / NET: 14 mL    Tele: SR     General: No distress. Comfortable.  HEENT: EOM intact.  Neck: Neck supple. JVP not elevated. nodule on left neck, non fluctuant  Chest: Clear to auscultation bilaterally  CV: Normal S1 and S2. No murmurs, rub, or gallops. Radial pulses normal. palpable R PT and L DP  Abdomen: Soft, non-distended, non-tender  Skin: No rashes or skin breakdown warm peripherally. R fem IABP site soft without hematoma  Neurology: Alert and oriented times three. Sensation intact  Psych: Affect normal    Labs:                        11.8   8.24  )-----------( 193      ( 19 Dec 2023 01:56 )             35.7     12-19    132<L>  |  104  |  30<H>  ----------------------------<  141<H>  4.1   |  17<L>  |  1.03    Ca    9.7      19 Dec 2023 01:56  Phos  3.6     12-19  Mg     1.8     12-19    TPro  6.7  /  Alb  3.8  /  TBili  0.3  /  DBili  x   /  AST  27  /  ALT  75<H>  /  AlkPhos  60  12-19    PT/INR - ( 19 Dec 2023 01:56 )   PT: 12.1 sec;   INR: 1.16 ratio         PTT - ( 19 Dec 2023 01:56 )  PTT:78.9 sec

## 2023-12-20 NOTE — PROGRESS NOTE ADULT - PROBLEM SELECTOR PLAN 5
Physical Therapy    Visit Type: treatment  SUBJECTIVE  Pt was out in the hallway walking with spouse.   Patient / Family Goal: maximize function    Pain   Patient reports pain is not an issue/concern.    At onset of session (out of 10): 0     OBJECTIVE     Cognitive Status   Orientation    - Oriented to: person, place and time    Patient Activity Tolerance: 1 to 1 activity to rest         Bed Mobility  Pt was up in the chair and returned following the session.  Transfers  Assistive devices: 1 person, 2-wheeled walker  - Stand to sit: modified independent  - Stand pivot: modified independent  No difficulty    Ambulation / Gait  - Assistive device: 1 person and two-wheeled walker  - Distance (feet unless otherwise indicated): 300  - Assist Level: modified independent  Pt ambulated with use of 2ww no loss of balance.     Stair Ambulation  - Number of steps: 13;   - Assist Level: supervision  - Rails: right rail only   Interventions     Additional Intervention Details: Pt needed recall of precautions.         Education:   - Present and ready to learn: patient  Education provided during session:  - Results of above outlined education: Needs reinforcement    ASSESSMENT   Progress: progressing toward goals  Interferring components: decreased activity tolerance    Discharge needs based on today's assessment:   - Current level of function: slightly below baseline level of function   - Therapy needs at discharge: does not require ongoing therapy   - Activities of daily living (ADLs) requiring support at discharge: ambulation and stairs   - Instrumental activities of daily living (IADLs) requiring support at discharge: home management   - Impairments that require further therapy intervention: activity tolerance, balance and strength    AM-PAC  - Generalized Prior Level of Function: IND/MOD I ([facility] 22-24)       Key: MOD A=moderate assistance, IND/MOD I=independent/modified independent  - Generalized Current Level of Function      - Current Mobility Score: 23       AM-PAC Scoring Key= >21 Modified Independent; 20-21 Supervision; 18-19 Minimal assist; 13-17 Moderate assist; 9-12 Max assist; <9 Total assist        PLAN (while hospitalized)  Suggestions for next session as indicated: Continue with stairs, recall of precautions.   PT Frequency: 3-5 x per week      PT/OT Mobility Equipment for Discharge: pt states having a walker at home.  PT/OT ADL Equipment for Discharge: pt has reacher and sock aid available  Agreement to plan and goals: patient agrees with goals and treatment plan        GOALS  Review Date: 11/17/2023  Long Term Goals: (to be met by time of discharge from hospital)  Sit to supine: Patient will complete sit to supine supervision.  Supine to sit: Patient will complete supine to sit supervision.  Sit to stand: Patient will complete sit to stand transfer with 2-wheeled walker, supervision.   Ambulation (even): Patient will ambulate on even surface for 150 feet with 2-wheeled walker, supervision.   Curb step: Patient will ambulate curb step with 2-wheeled walker, supervision.     Documented in the chart in the following areas: Assessment/Plan.      Patient at End of Session:   Location: in chair  Safety measures: call light within reach  Handoff to: family/caregiver and nurse      Therapy procedure time and total treatment time can be found documented on the Time Entry flowsheet   BCx from 11/17 with GPC in pair/chains in both bottles; Mixed cultures Staph epi and Enterococcus faecalis   Repeat cultures from 11/18; NGTD  Currently on vancomycin. Has remote Hx PCN allergy per ID but unclear as he doesn't recall reaction. Cleared for OHT listing  -s/p IABP exchange from RFA to LFA on 11/20.  + rods in blood culture on 11/30 (reported on 12/4), restarted on vancomycin, was status 7, now with repeat Blood cx negative and off abx, back to status 2.   appreciated transplant ID recs.

## 2023-12-21 LAB
ALBUMIN SERPL ELPH-MCNC: 3.9 G/DL — SIGNIFICANT CHANGE UP (ref 3.3–5)
ALBUMIN SERPL ELPH-MCNC: 3.9 G/DL — SIGNIFICANT CHANGE UP (ref 3.3–5)
ALP SERPL-CCNC: 63 U/L — SIGNIFICANT CHANGE UP (ref 40–120)
ALP SERPL-CCNC: 63 U/L — SIGNIFICANT CHANGE UP (ref 40–120)
ALT FLD-CCNC: 67 U/L — HIGH (ref 10–45)
ALT FLD-CCNC: 67 U/L — HIGH (ref 10–45)
ANION GAP SERPL CALC-SCNC: 11 MMOL/L — SIGNIFICANT CHANGE UP (ref 5–17)
ANION GAP SERPL CALC-SCNC: 11 MMOL/L — SIGNIFICANT CHANGE UP (ref 5–17)
APTT BLD: 79.1 SEC — HIGH (ref 24.5–35.6)
APTT BLD: 79.1 SEC — HIGH (ref 24.5–35.6)
AST SERPL-CCNC: 22 U/L — SIGNIFICANT CHANGE UP (ref 10–40)
AST SERPL-CCNC: 22 U/L — SIGNIFICANT CHANGE UP (ref 10–40)
BASOPHILS # BLD AUTO: 0.05 K/UL — SIGNIFICANT CHANGE UP (ref 0–0.2)
BASOPHILS # BLD AUTO: 0.05 K/UL — SIGNIFICANT CHANGE UP (ref 0–0.2)
BASOPHILS NFR BLD AUTO: 0.7 % — SIGNIFICANT CHANGE UP (ref 0–2)
BASOPHILS NFR BLD AUTO: 0.7 % — SIGNIFICANT CHANGE UP (ref 0–2)
BILIRUB SERPL-MCNC: 0.2 MG/DL — SIGNIFICANT CHANGE UP (ref 0.2–1.2)
BILIRUB SERPL-MCNC: 0.2 MG/DL — SIGNIFICANT CHANGE UP (ref 0.2–1.2)
BUN SERPL-MCNC: 32 MG/DL — HIGH (ref 7–23)
BUN SERPL-MCNC: 32 MG/DL — HIGH (ref 7–23)
CALCIUM SERPL-MCNC: 10 MG/DL — SIGNIFICANT CHANGE UP (ref 8.4–10.5)
CALCIUM SERPL-MCNC: 10 MG/DL — SIGNIFICANT CHANGE UP (ref 8.4–10.5)
CHLORIDE SERPL-SCNC: 104 MMOL/L — SIGNIFICANT CHANGE UP (ref 96–108)
CHLORIDE SERPL-SCNC: 104 MMOL/L — SIGNIFICANT CHANGE UP (ref 96–108)
CO2 SERPL-SCNC: 19 MMOL/L — LOW (ref 22–31)
CO2 SERPL-SCNC: 19 MMOL/L — LOW (ref 22–31)
CREAT SERPL-MCNC: 1.24 MG/DL — SIGNIFICANT CHANGE UP (ref 0.5–1.3)
CREAT SERPL-MCNC: 1.24 MG/DL — SIGNIFICANT CHANGE UP (ref 0.5–1.3)
EGFR: 67 ML/MIN/1.73M2 — SIGNIFICANT CHANGE UP
EGFR: 67 ML/MIN/1.73M2 — SIGNIFICANT CHANGE UP
EOSINOPHIL # BLD AUTO: 0.55 K/UL — HIGH (ref 0–0.5)
EOSINOPHIL # BLD AUTO: 0.55 K/UL — HIGH (ref 0–0.5)
EOSINOPHIL NFR BLD AUTO: 7.6 % — HIGH (ref 0–6)
EOSINOPHIL NFR BLD AUTO: 7.6 % — HIGH (ref 0–6)
GLUCOSE BLDC GLUCOMTR-MCNC: 100 MG/DL — HIGH (ref 70–99)
GLUCOSE BLDC GLUCOMTR-MCNC: 100 MG/DL — HIGH (ref 70–99)
GLUCOSE BLDC GLUCOMTR-MCNC: 112 MG/DL — HIGH (ref 70–99)
GLUCOSE BLDC GLUCOMTR-MCNC: 112 MG/DL — HIGH (ref 70–99)
GLUCOSE BLDC GLUCOMTR-MCNC: 139 MG/DL — HIGH (ref 70–99)
GLUCOSE BLDC GLUCOMTR-MCNC: 139 MG/DL — HIGH (ref 70–99)
GLUCOSE BLDC GLUCOMTR-MCNC: 146 MG/DL — HIGH (ref 70–99)
GLUCOSE BLDC GLUCOMTR-MCNC: 146 MG/DL — HIGH (ref 70–99)
GLUCOSE SERPL-MCNC: 143 MG/DL — HIGH (ref 70–99)
GLUCOSE SERPL-MCNC: 143 MG/DL — HIGH (ref 70–99)
HCT VFR BLD CALC: 36.2 % — LOW (ref 39–50)
HCT VFR BLD CALC: 36.2 % — LOW (ref 39–50)
HGB BLD-MCNC: 12.1 G/DL — LOW (ref 13–17)
HGB BLD-MCNC: 12.1 G/DL — LOW (ref 13–17)
IMM GRANULOCYTES NFR BLD AUTO: 0.4 % — SIGNIFICANT CHANGE UP (ref 0–0.9)
IMM GRANULOCYTES NFR BLD AUTO: 0.4 % — SIGNIFICANT CHANGE UP (ref 0–0.9)
INR BLD: 1.15 RATIO — SIGNIFICANT CHANGE UP (ref 0.85–1.18)
INR BLD: 1.15 RATIO — SIGNIFICANT CHANGE UP (ref 0.85–1.18)
LACTATE SERPL-SCNC: 0.9 MMOL/L — SIGNIFICANT CHANGE UP (ref 0.5–2)
LACTATE SERPL-SCNC: 0.9 MMOL/L — SIGNIFICANT CHANGE UP (ref 0.5–2)
LYMPHOCYTES # BLD AUTO: 1.48 K/UL — SIGNIFICANT CHANGE UP (ref 1–3.3)
LYMPHOCYTES # BLD AUTO: 1.48 K/UL — SIGNIFICANT CHANGE UP (ref 1–3.3)
LYMPHOCYTES # BLD AUTO: 20.5 % — SIGNIFICANT CHANGE UP (ref 13–44)
LYMPHOCYTES # BLD AUTO: 20.5 % — SIGNIFICANT CHANGE UP (ref 13–44)
MAGNESIUM SERPL-MCNC: 1.9 MG/DL — SIGNIFICANT CHANGE UP (ref 1.6–2.6)
MAGNESIUM SERPL-MCNC: 1.9 MG/DL — SIGNIFICANT CHANGE UP (ref 1.6–2.6)
MCHC RBC-ENTMCNC: 29.9 PG — SIGNIFICANT CHANGE UP (ref 27–34)
MCHC RBC-ENTMCNC: 29.9 PG — SIGNIFICANT CHANGE UP (ref 27–34)
MCHC RBC-ENTMCNC: 33.4 GM/DL — SIGNIFICANT CHANGE UP (ref 32–36)
MCHC RBC-ENTMCNC: 33.4 GM/DL — SIGNIFICANT CHANGE UP (ref 32–36)
MCV RBC AUTO: 89.4 FL — SIGNIFICANT CHANGE UP (ref 80–100)
MCV RBC AUTO: 89.4 FL — SIGNIFICANT CHANGE UP (ref 80–100)
MONOCYTES # BLD AUTO: 0.58 K/UL — SIGNIFICANT CHANGE UP (ref 0–0.9)
MONOCYTES # BLD AUTO: 0.58 K/UL — SIGNIFICANT CHANGE UP (ref 0–0.9)
MONOCYTES NFR BLD AUTO: 8 % — SIGNIFICANT CHANGE UP (ref 2–14)
MONOCYTES NFR BLD AUTO: 8 % — SIGNIFICANT CHANGE UP (ref 2–14)
NEUTROPHILS # BLD AUTO: 4.52 K/UL — SIGNIFICANT CHANGE UP (ref 1.8–7.4)
NEUTROPHILS # BLD AUTO: 4.52 K/UL — SIGNIFICANT CHANGE UP (ref 1.8–7.4)
NEUTROPHILS NFR BLD AUTO: 62.8 % — SIGNIFICANT CHANGE UP (ref 43–77)
NEUTROPHILS NFR BLD AUTO: 62.8 % — SIGNIFICANT CHANGE UP (ref 43–77)
NRBC # BLD: 0 /100 WBCS — SIGNIFICANT CHANGE UP (ref 0–0)
NRBC # BLD: 0 /100 WBCS — SIGNIFICANT CHANGE UP (ref 0–0)
PHOSPHATE SERPL-MCNC: 3.9 MG/DL — SIGNIFICANT CHANGE UP (ref 2.5–4.5)
PHOSPHATE SERPL-MCNC: 3.9 MG/DL — SIGNIFICANT CHANGE UP (ref 2.5–4.5)
PLATELET # BLD AUTO: 181 K/UL — SIGNIFICANT CHANGE UP (ref 150–400)
PLATELET # BLD AUTO: 181 K/UL — SIGNIFICANT CHANGE UP (ref 150–400)
POTASSIUM SERPL-MCNC: 4.3 MMOL/L — SIGNIFICANT CHANGE UP (ref 3.5–5.3)
POTASSIUM SERPL-MCNC: 4.3 MMOL/L — SIGNIFICANT CHANGE UP (ref 3.5–5.3)
POTASSIUM SERPL-SCNC: 4.3 MMOL/L — SIGNIFICANT CHANGE UP (ref 3.5–5.3)
POTASSIUM SERPL-SCNC: 4.3 MMOL/L — SIGNIFICANT CHANGE UP (ref 3.5–5.3)
PROT SERPL-MCNC: 6.9 G/DL — SIGNIFICANT CHANGE UP (ref 6–8.3)
PROT SERPL-MCNC: 6.9 G/DL — SIGNIFICANT CHANGE UP (ref 6–8.3)
PROTHROM AB SERPL-ACNC: 12 SEC — SIGNIFICANT CHANGE UP (ref 9.5–13)
PROTHROM AB SERPL-ACNC: 12 SEC — SIGNIFICANT CHANGE UP (ref 9.5–13)
RBC # BLD: 4.05 M/UL — LOW (ref 4.2–5.8)
RBC # BLD: 4.05 M/UL — LOW (ref 4.2–5.8)
RBC # FLD: 15.3 % — HIGH (ref 10.3–14.5)
RBC # FLD: 15.3 % — HIGH (ref 10.3–14.5)
SODIUM SERPL-SCNC: 134 MMOL/L — LOW (ref 135–145)
SODIUM SERPL-SCNC: 134 MMOL/L — LOW (ref 135–145)
WBC # BLD: 7.21 K/UL — SIGNIFICANT CHANGE UP (ref 3.8–10.5)
WBC # BLD: 7.21 K/UL — SIGNIFICANT CHANGE UP (ref 3.8–10.5)
WBC # FLD AUTO: 7.21 K/UL — SIGNIFICANT CHANGE UP (ref 3.8–10.5)
WBC # FLD AUTO: 7.21 K/UL — SIGNIFICANT CHANGE UP (ref 3.8–10.5)

## 2023-12-21 PROCEDURE — 71045 X-RAY EXAM CHEST 1 VIEW: CPT | Mod: 26

## 2023-12-21 PROCEDURE — 99292 CRITICAL CARE ADDL 30 MIN: CPT

## 2023-12-21 PROCEDURE — 99152 MOD SED SAME PHYS/QHP 5/>YRS: CPT

## 2023-12-21 PROCEDURE — 93010 ELECTROCARDIOGRAM REPORT: CPT

## 2023-12-21 PROCEDURE — 99292 CRITICAL CARE ADDL 30 MIN: CPT | Mod: 25

## 2023-12-21 PROCEDURE — 33967 INSERT I-AORT PERCUT DEVICE: CPT | Mod: 59

## 2023-12-21 PROCEDURE — 99291 CRITICAL CARE FIRST HOUR: CPT

## 2023-12-21 PROCEDURE — 90832 PSYTX W PT 30 MINUTES: CPT

## 2023-12-21 PROCEDURE — 90791 PSYCH DIAGNOSTIC EVALUATION: CPT

## 2023-12-21 RX ORDER — INSULIN LISPRO 100/ML
4 VIAL (ML) SUBCUTANEOUS ONCE
Refills: 0 | Status: COMPLETED | OUTPATIENT
Start: 2023-12-21 | End: 2023-12-21

## 2023-12-21 RX ORDER — MAGNESIUM SULFATE 500 MG/ML
1 VIAL (ML) INJECTION ONCE
Refills: 0 | Status: COMPLETED | OUTPATIENT
Start: 2023-12-21 | End: 2023-12-21

## 2023-12-21 RX ADMIN — CARVEDILOL PHOSPHATE 25 MILLIGRAM(S): 80 CAPSULE, EXTENDED RELEASE ORAL at 05:29

## 2023-12-21 RX ADMIN — CHLORHEXIDINE GLUCONATE 1 APPLICATION(S): 213 SOLUTION TOPICAL at 21:21

## 2023-12-21 RX ADMIN — Medication 4 UNIT(S): at 09:08

## 2023-12-21 RX ADMIN — CARVEDILOL PHOSPHATE 25 MILLIGRAM(S): 80 CAPSULE, EXTENDED RELEASE ORAL at 18:16

## 2023-12-21 RX ADMIN — Medication 5 MILLIGRAM(S): at 18:15

## 2023-12-21 RX ADMIN — Medication 9 UNIT(S): at 12:23

## 2023-12-21 RX ADMIN — Medication 50 MILLIGRAM(S): at 21:19

## 2023-12-21 RX ADMIN — Medication 100 GRAM(S): at 03:41

## 2023-12-21 RX ADMIN — Medication 50 MILLIGRAM(S): at 14:43

## 2023-12-21 RX ADMIN — Medication 81 MILLIGRAM(S): at 10:37

## 2023-12-21 RX ADMIN — Medication 4 UNIT(S): at 18:30

## 2023-12-21 RX ADMIN — BUDESONIDE AND FORMOTEROL FUMARATE DIHYDRATE 2 PUFF(S): 160; 4.5 AEROSOL RESPIRATORY (INHALATION) at 21:16

## 2023-12-21 RX ADMIN — ATORVASTATIN CALCIUM 80 MILLIGRAM(S): 80 TABLET, FILM COATED ORAL at 21:20

## 2023-12-21 RX ADMIN — HEPARIN SODIUM 14 UNIT(S)/HR: 5000 INJECTION INTRAVENOUS; SUBCUTANEOUS at 09:08

## 2023-12-21 RX ADMIN — Medication 5 MILLIGRAM(S): at 05:29

## 2023-12-21 RX ADMIN — INSULIN GLARGINE 12 UNIT(S): 100 INJECTION, SOLUTION SUBCUTANEOUS at 21:20

## 2023-12-21 RX ADMIN — ISOSORBIDE DINITRATE 30 MILLIGRAM(S): 5 TABLET ORAL at 05:29

## 2023-12-21 RX ADMIN — BUDESONIDE AND FORMOTEROL FUMARATE DIHYDRATE 2 PUFF(S): 160; 4.5 AEROSOL RESPIRATORY (INHALATION) at 08:19

## 2023-12-21 RX ADMIN — ISOSORBIDE DINITRATE 30 MILLIGRAM(S): 5 TABLET ORAL at 10:37

## 2023-12-21 RX ADMIN — ISOSORBIDE DINITRATE 30 MILLIGRAM(S): 5 TABLET ORAL at 18:15

## 2023-12-21 RX ADMIN — Medication 50 MILLIGRAM(S): at 05:29

## 2023-12-21 NOTE — PROGRESS NOTE ADULT - SUBJECTIVE AND OBJECTIVE BOX
PATIENT:  DEVORAH VALENCIA  [id number]    CHIEF COMPLAINT:  Patient is a 58y old  Male who presents with a chief complaint of heart failure (20 Dec 2023 19:27)      INTERVAL HISTORY/OVERNIGHT EVENTS:  The patient was seen and examined at bedside.     REVIEW OF SYSTEMS:    Constitutional:     [ ] negative [ ] fevers [ ] chills [ ] weight loss [ ] weight gain  HEENT:                  [ ] negative [ ] dry eyes [ ] eye irritation [ ] postnasal drip [ ] nasal congestion  CV:                         [ ] negative  [ ] chest pain [ ] orthopnea [ ] palpitations [ ] murmur  Resp:                     [ ] negative [ ] cough [ ] shortness of breath [ ] dyspnea [ ] wheezing [ ] sputum [ ] hemoptysis  GI:                          [ ] negative [ ] nausea [ ] vomiting [ ] diarrhea [ ] constipation [ ] abd pain [ ] dysphagia   :                        [ ] negative [ ] dysuria [ ] nocturia [ ] hematuria [ ] increased urinary frequency  Musculoskeletal: [ ] negative [ ] back pain [ ] myalgias [ ] arthralgias [ ] fracture  Skin:                       [ ] negative [ ] rash [ ] itch  Neurological:        [ ] negative [ ] headache [ ] dizziness [ ] syncope [ ] weakness [ ] numbness  Psychiatric:           [ ] negative [ ] anxiety [ ] depression  Endocrine:            [ ] negative [ ] diabetes [ ] thyroid problem  Heme/Lymph:      [ ] negative [ ] anemia [ ] bleeding problem  Allergic/Immune: [ ] negative [ ] itchy eyes [ ] nasal discharge [ ] hives [ ] angioedema    [ ] All other systems negative  [ ] Unable to assess ROS because ________.    MEDICATIONS:  MEDICATIONS  (STANDING):  aspirin  chewable 81 milliGRAM(s) Oral daily  atorvastatin 80 milliGRAM(s) Oral at bedtime  bisacodyl 5 milliGRAM(s) Oral every 12 hours  budesonide  80 MICROgram(s)/formoterol 4.5 MICROgram(s) Inhaler 2 Puff(s) Inhalation two times a day  carvedilol 25 milliGRAM(s) Oral every 12 hours  chlorhexidine 2% Cloths 1 Application(s) Topical daily  heparin  Infusion 1300 Unit(s)/Hr (14 mL/Hr) IV Continuous <Continuous>  hepatitis B Vaccine - Adult (HEPLISAV-B) 20 MICROGram(s) IntraMuscular once  hydrALAZINE 50 milliGRAM(s) Oral every 8 hours  insulin glargine Injectable (LANTUS) 12 Unit(s) SubCutaneous at bedtime  insulin lispro (ADMELOG) corrective regimen sliding scale   SubCutaneous at bedtime  insulin lispro (ADMELOG) corrective regimen sliding scale   SubCutaneous three times a day before meals  insulin lispro Injectable (ADMELOG) 9 Unit(s) SubCutaneous three times a day before meals  isosorbide   dinitrate Tablet (ISORDIL) 30 milliGRAM(s) Oral three times a day  polyethylene glycol 3350 17 Gram(s) Oral two times a day  senna 2 Tablet(s) Oral at bedtime    MEDICATIONS  (PRN):  hydrOXYzine hydrochloride 25 milliGRAM(s) Oral two times a day PRN Anxiety      ALLERGIES:  Allergies    penicillins (Unknown)    Intolerances        OBJECTIVE:  ICU Vital Signs Last 24 Hrs  T(C): 36.7 (21 Dec 2023 03:00), Max: 36.9 (20 Dec 2023 15:00)  T(F): 98 (21 Dec 2023 03:00), Max: 98.5 (20 Dec 2023 15:00)  HR: 75 (21 Dec 2023 07:00) (71 - 91)  BP: --  BP(mean): --  ABP: --  ABP(mean): --  RR: 18 (21 Dec 2023 07:00) (14 - 32)  SpO2: 98% (21 Dec 2023 07:00) (94% - 100%)    O2 Parameters below as of 21 Dec 2023 07:00  Patient On (Oxygen Delivery Method): room air            Adult Advanced Hemodynamics Last 24 Hrs  CVP(mm Hg): --  CVP(cm H2O): --  CO: --  CI: --  PA: --  PA(mean): --  PCWP: --  SVR: --  SVRI: --  PVR: --  PVRI: --  CAPILLARY BLOOD GLUCOSE      POCT Blood Glucose.: 152 mg/dL (20 Dec 2023 21:56)  POCT Blood Glucose.: 177 mg/dL (20 Dec 2023 17:05)  POCT Blood Glucose.: 157 mg/dL (20 Dec 2023 11:30)    CAPILLARY BLOOD GLUCOSE      POCT Blood Glucose.: 152 mg/dL (20 Dec 2023 21:56)    I&O's Summary    20 Dec 2023 07:01  -  21 Dec 2023 07:00  --------------------------------------------------------  IN: 802 mL / OUT: 1080 mL / NET: -278 mL      Daily     Daily Weight in k (21 Dec 2023 06:00)    PHYSICAL EXAMINATION:  General: WN/WD NAD  HEENT: PERRLA, EOMI, moist mucous membranes  Neurology: A&Ox3, nonfocal, MOISE x 4  Respiratory: CTA B/L, normal respiratory effort, no wheezes, crackles, rales  CV: RRR, S1S2, no murmurs, rubs or gallops  Abdominal: Soft, NT, ND +BS, Last BM  Extremities: No edema, + peripheral pulses  Incisions:   Tubes:    LABS:                          12.1   7.21  )-----------( 181      ( 21 Dec 2023 02:02 )             36.2     12-    134<L>  |  104  |  32<H>  ----------------------------<  143<H>  4.3   |  19<L>  |  1.24    Ca    10.0      21 Dec 2023 02:02  Phos  3.9     12-  Mg     1.9     12-    TPro  6.9  /  Alb  3.9  /  TBili  0.2  /  DBili  x   /  AST  22  /  ALT  67<H>  /  AlkPhos  63  12-21    LIVER FUNCTIONS - ( 21 Dec 2023 02:02 )  Alb: 3.9 g/dL / Pro: 6.9 g/dL / ALK PHOS: 63 U/L / ALT: 67 U/L / AST: 22 U/L / GGT: x           PT/INR - ( 21 Dec 2023 02:02 )   PT: 12.0 sec;   INR: 1.15 ratio         PTT - ( 21 Dec 2023 02:02 )  PTT:79.1 sec        Urinalysis Basic - ( 21 Dec 2023 02:02 )    Color: x / Appearance: x / SG: x / pH: x  Gluc: 143 mg/dL / Ketone: x  / Bili: x / Urobili: x   Blood: x / Protein: x / Nitrite: x   Leuk Esterase: x / RBC: x / WBC x   Sq Epi: x / Non Sq Epi: x / Bacteria: x        TELEMETRY:     EKG:     IMAGING:       PATIENT:  DEVORAH VALENCAI  [id number]    CHIEF COMPLAINT:  Patient is a 58y old  Male who presents with a chief complaint of heart failure (20 Dec 2023 19:27)      INTERVAL HISTORY/OVERNIGHT EVENTS:  The patient was seen and examined at bedside.     REVIEW OF SYSTEMS:    Constitutional:     [ ] negative [ ] fevers [ ] chills [ ] weight loss [ ] weight gain  HEENT:                  [ ] negative [ ] dry eyes [ ] eye irritation [ ] postnasal drip [ ] nasal congestion  CV:                         [ ] negative  [ ] chest pain [ ] orthopnea [ ] palpitations [ ] murmur  Resp:                     [ ] negative [ ] cough [ ] shortness of breath [ ] dyspnea [ ] wheezing [ ] sputum [ ] hemoptysis  GI:                          [ ] negative [ ] nausea [ ] vomiting [ ] diarrhea [ ] constipation [ ] abd pain [ ] dysphagia   :                        [ ] negative [ ] dysuria [ ] nocturia [ ] hematuria [ ] increased urinary frequency  Musculoskeletal: [ ] negative [ ] back pain [ ] myalgias [ ] arthralgias [ ] fracture  Skin:                       [ ] negative [ ] rash [ ] itch  Neurological:        [ ] negative [ ] headache [ ] dizziness [ ] syncope [ ] weakness [ ] numbness  Psychiatric:           [ ] negative [ ] anxiety [ ] depression  Endocrine:            [ ] negative [ ] diabetes [ ] thyroid problem  Heme/Lymph:      [ ] negative [ ] anemia [ ] bleeding problem  Allergic/Immune: [ ] negative [ ] itchy eyes [ ] nasal discharge [ ] hives [ ] angioedema    [ ] All other systems negative  [ ] Unable to assess ROS because ________.    MEDICATIONS:  MEDICATIONS  (STANDING):  aspirin  chewable 81 milliGRAM(s) Oral daily  atorvastatin 80 milliGRAM(s) Oral at bedtime  bisacodyl 5 milliGRAM(s) Oral every 12 hours  budesonide  80 MICROgram(s)/formoterol 4.5 MICROgram(s) Inhaler 2 Puff(s) Inhalation two times a day  carvedilol 25 milliGRAM(s) Oral every 12 hours  chlorhexidine 2% Cloths 1 Application(s) Topical daily  heparin  Infusion 1300 Unit(s)/Hr (14 mL/Hr) IV Continuous <Continuous>  hepatitis B Vaccine - Adult (HEPLISAV-B) 20 MICROGram(s) IntraMuscular once  hydrALAZINE 50 milliGRAM(s) Oral every 8 hours  insulin glargine Injectable (LANTUS) 12 Unit(s) SubCutaneous at bedtime  insulin lispro (ADMELOG) corrective regimen sliding scale   SubCutaneous at bedtime  insulin lispro (ADMELOG) corrective regimen sliding scale   SubCutaneous three times a day before meals  insulin lispro Injectable (ADMELOG) 9 Unit(s) SubCutaneous three times a day before meals  isosorbide   dinitrate Tablet (ISORDIL) 30 milliGRAM(s) Oral three times a day  polyethylene glycol 3350 17 Gram(s) Oral two times a day  senna 2 Tablet(s) Oral at bedtime    MEDICATIONS  (PRN):  hydrOXYzine hydrochloride 25 milliGRAM(s) Oral two times a day PRN Anxiety      ALLERGIES:  Allergies    penicillins (Unknown)    Intolerances        OBJECTIVE:  ICU Vital Signs Last 24 Hrs  T(C): 36.7 (21 Dec 2023 03:00), Max: 36.9 (20 Dec 2023 15:00)  T(F): 98 (21 Dec 2023 03:00), Max: 98.5 (20 Dec 2023 15:00)  HR: 75 (21 Dec 2023 07:00) (71 - 91)  BP: --  BP(mean): --  ABP: --  ABP(mean): --  RR: 18 (21 Dec 2023 07:00) (14 - 32)  SpO2: 98% (21 Dec 2023 07:00) (94% - 100%)    O2 Parameters below as of 21 Dec 2023 07:00  Patient On (Oxygen Delivery Method): room air            Adult Advanced Hemodynamics Last 24 Hrs  CVP(mm Hg): --  CVP(cm H2O): --  CO: --  CI: --  PA: --  PA(mean): --  PCWP: --  SVR: --  SVRI: --  PVR: --  PVRI: --  CAPILLARY BLOOD GLUCOSE      POCT Blood Glucose.: 152 mg/dL (20 Dec 2023 21:56)  POCT Blood Glucose.: 177 mg/dL (20 Dec 2023 17:05)  POCT Blood Glucose.: 157 mg/dL (20 Dec 2023 11:30)    CAPILLARY BLOOD GLUCOSE      POCT Blood Glucose.: 152 mg/dL (20 Dec 2023 21:56)    I&O's Summary    20 Dec 2023 07:01  -  21 Dec 2023 07:00  --------------------------------------------------------  IN: 802 mL / OUT: 1080 mL / NET: -278 mL      Daily     Daily Weight in k (21 Dec 2023 06:00)    PHYSICAL EXAMINATION:  General: WN/WD NAD  HEENT: PERRLA, EOMI, moist mucous membranes  Neurology: A&Ox3, nonfocal, MOISE x 4  Respiratory: CTA B/L, normal respiratory effort, no wheezes, crackles, rales  CV: RRR, S1S2, no murmurs, rubs or gallops  Abdominal: Soft, NT, ND +BS, Last BM  Extremities: No edema, + peripheral pulses  Incisions:   Tubes:    LABS:                          12.1   7.21  )-----------( 181      ( 21 Dec 2023 02:02 )             36.2     12-    134<L>  |  104  |  32<H>  ----------------------------<  143<H>  4.3   |  19<L>  |  1.24    Ca    10.0      21 Dec 2023 02:02  Phos  3.9     12-  Mg     1.9     12-    TPro  6.9  /  Alb  3.9  /  TBili  0.2  /  DBili  x   /  AST  22  /  ALT  67<H>  /  AlkPhos  63  12-21    LIVER FUNCTIONS - ( 21 Dec 2023 02:02 )  Alb: 3.9 g/dL / Pro: 6.9 g/dL / ALK PHOS: 63 U/L / ALT: 67 U/L / AST: 22 U/L / GGT: x           PT/INR - ( 21 Dec 2023 02:02 )   PT: 12.0 sec;   INR: 1.15 ratio         PTT - ( 21 Dec 2023 02:02 )  PTT:79.1 sec        Urinalysis Basic - ( 21 Dec 2023 02:02 )    Color: x / Appearance: x / SG: x / pH: x  Gluc: 143 mg/dL / Ketone: x  / Bili: x / Urobili: x   Blood: x / Protein: x / Nitrite: x   Leuk Esterase: x / RBC: x / WBC x   Sq Epi: x / Non Sq Epi: x / Bacteria: x        TELEMETRY:     EKG:     IMAGING:       PATIENT:  DEVORAH VALENCIA  [id number]    CHIEF COMPLAINT:  Patient is a 58y old  Male who presents with a chief complaint of heart failure (20 Dec 2023 19:27)      INTERVAL HISTORY/OVERNIGHT EVENTS:  Air leak alarm on IABP overnight, now resolved. Patient's last BM 2 days ago.    REVIEW OF SYSTEMS:  GENERAL: No fever or chills  EYES: No change in vision  HEENT: No trouble swallowing or speaking  CARDIAC: No chest pain  PULMONARY: No cough or SOB  GI: No abdominal pain, no nausea or no vomiting  : No changes in urination  SKIN: No rashes  NEURO: No headache, no numbness  MSK: No joint pain  Otherwise as HPI or negative.    MEDICATIONS:  MEDICATIONS  (STANDING):  aspirin  chewable 81 milliGRAM(s) Oral daily  atorvastatin 80 milliGRAM(s) Oral at bedtime  bisacodyl 5 milliGRAM(s) Oral every 12 hours  budesonide  80 MICROgram(s)/formoterol 4.5 MICROgram(s) Inhaler 2 Puff(s) Inhalation two times a day  carvedilol 25 milliGRAM(s) Oral every 12 hours  chlorhexidine 2% Cloths 1 Application(s) Topical daily  heparin  Infusion 1300 Unit(s)/Hr (14 mL/Hr) IV Continuous <Continuous>  hepatitis B Vaccine - Adult (HEPLISAV-B) 20 MICROGram(s) IntraMuscular once  hydrALAZINE 50 milliGRAM(s) Oral every 8 hours  insulin glargine Injectable (LANTUS) 12 Unit(s) SubCutaneous at bedtime  insulin lispro (ADMELOG) corrective regimen sliding scale   SubCutaneous at bedtime  insulin lispro (ADMELOG) corrective regimen sliding scale   SubCutaneous three times a day before meals  insulin lispro Injectable (ADMELOG) 9 Unit(s) SubCutaneous three times a day before meals  isosorbide   dinitrate Tablet (ISORDIL) 30 milliGRAM(s) Oral three times a day  polyethylene glycol 3350 17 Gram(s) Oral two times a day  senna 2 Tablet(s) Oral at bedtime    MEDICATIONS  (PRN):  hydrOXYzine hydrochloride 25 milliGRAM(s) Oral two times a day PRN Anxiety      ALLERGIES:  Allergies    penicillins (Unknown)    Intolerances        OBJECTIVE:  ICU Vital Signs Last 24 Hrs  T(C): 36.7 (21 Dec 2023 03:00), Max: 36.9 (20 Dec 2023 15:00)  T(F): 98 (21 Dec 2023 03:00), Max: 98.5 (20 Dec 2023 15:00)  HR: 75 (21 Dec 2023 07:00) (71 - 91)  BP: --  BP(mean): --  ABP: --  ABP(mean): --  RR: 18 (21 Dec 2023 07:00) (14 - 32)  SpO2: 98% (21 Dec 2023 07:00) (94% - 100%)    O2 Parameters below as of 21 Dec 2023 07:00  Patient On (Oxygen Delivery Method): room air      POCT Blood Glucose.: 152 mg/dL (20 Dec 2023 21:56)  POCT Blood Glucose.: 177 mg/dL (20 Dec 2023 17:05)  POCT Blood Glucose.: 157 mg/dL (20 Dec 2023 11:30)    CAPILLARY BLOOD GLUCOSE      POCT Blood Glucose.: 152 mg/dL (20 Dec 2023 21:56)    I&O's Summary    20 Dec 2023 07:01  -  21 Dec 2023 07:00  --------------------------------------------------------  IN: 802 mL / OUT: 1080 mL / NET: -278 mL      Daily     Daily Weight in k (21 Dec 2023 06:00)    PHYSICAL EXAMINATION:  GENERAL: No acute distress, well-developed  HEAD:  Atraumatic, Normocephalic  EYES: EOMI, PERRLA, conjunctiva and sclera clear  CHEST/LUNG: CTAB; No wheezes, rales, or rhonchi  HEART: Regular rate and rhythm. Normal S1/S2. No murmurs, rubs, or gallops  ABDOMEN: Soft, non-tender, non-distended; normal bowel sounds, no organomegaly  EXTREMITIES:  2+ peripheral pulses b/l, No clubbing, cyanosis, or edema  NEUROLOGY: A&O x 3, no focal deficits  SKIN: No rashes or lesions  Lines: R femoral IABP dsg c/d/i    LABS:                          12.1   7.21  )-----------( 181      ( 21 Dec 2023 02:02 )             36.2     12-    134<L>  |  104  |  32<H>  ----------------------------<  143<H>  4.3   |  19<L>  |  1.24    Ca    10.0      21 Dec 2023 02:02  Phos  3.9     12-  Mg     1.9     12-    TPro  6.9  /  Alb  3.9  /  TBili  0.2  /  DBili  x   /  AST  22  /  ALT  67<H>  /  AlkPhos  63  12-    LIVER FUNCTIONS - ( 21 Dec 2023 02:02 )  Alb: 3.9 g/dL / Pro: 6.9 g/dL / ALK PHOS: 63 U/L / ALT: 67 U/L / AST: 22 U/L / GGT: x           PT/INR - ( 21 Dec 2023 02:02 )   PT: 12.0 sec;   INR: 1.15 ratio         PTT - ( 21 Dec 2023 02:02 )  PTT:79.1 sec       PATIENT:  DEVORAH VALENCIA  [id number]    CHIEF COMPLAINT:  Patient is a 58y old  Male who presents with a chief complaint of heart failure (20 Dec 2023 19:27)      INTERVAL HISTORY/OVERNIGHT EVENTS:  Air leak alarm on IABP overnight, now resolved. Patient's last BM 2 days ago.    REVIEW OF SYSTEMS:  GENERAL: No fever or chills  EYES: No change in vision  HEENT: No trouble swallowing or speaking  CARDIAC: No chest pain  PULMONARY: No cough or SOB  GI: No abdominal pain, no nausea or no vomiting  : No changes in urination  SKIN: No rashes  NEURO: No headache, no numbness  MSK: No joint pain  Otherwise as HPI or negative.    MEDICATIONS:  MEDICATIONS  (STANDING):  aspirin  chewable 81 milliGRAM(s) Oral daily  atorvastatin 80 milliGRAM(s) Oral at bedtime  bisacodyl 5 milliGRAM(s) Oral every 12 hours  budesonide  80 MICROgram(s)/formoterol 4.5 MICROgram(s) Inhaler 2 Puff(s) Inhalation two times a day  carvedilol 25 milliGRAM(s) Oral every 12 hours  chlorhexidine 2% Cloths 1 Application(s) Topical daily  heparin  Infusion 1300 Unit(s)/Hr (14 mL/Hr) IV Continuous <Continuous>  hepatitis B Vaccine - Adult (HEPLISAV-B) 20 MICROGram(s) IntraMuscular once  hydrALAZINE 50 milliGRAM(s) Oral every 8 hours  insulin glargine Injectable (LANTUS) 12 Unit(s) SubCutaneous at bedtime  insulin lispro (ADMELOG) corrective regimen sliding scale   SubCutaneous at bedtime  insulin lispro (ADMELOG) corrective regimen sliding scale   SubCutaneous three times a day before meals  insulin lispro Injectable (ADMELOG) 9 Unit(s) SubCutaneous three times a day before meals  isosorbide   dinitrate Tablet (ISORDIL) 30 milliGRAM(s) Oral three times a day  polyethylene glycol 3350 17 Gram(s) Oral two times a day  senna 2 Tablet(s) Oral at bedtime    MEDICATIONS  (PRN):  hydrOXYzine hydrochloride 25 milliGRAM(s) Oral two times a day PRN Anxiety      ALLERGIES:  Allergies    penicillins (Unknown)    Intolerances        OBJECTIVE:  ICU Vital Signs Last 24 Hrs  T(C): 36.7 (21 Dec 2023 03:00), Max: 36.9 (20 Dec 2023 15:00)  T(F): 98 (21 Dec 2023 03:00), Max: 98.5 (20 Dec 2023 15:00)  HR: 75 (21 Dec 2023 07:00) (71 - 91)  BP: --  BP(mean): --  ABP: --  ABP(mean): --  RR: 18 (21 Dec 2023 07:00) (14 - 32)  SpO2: 98% (21 Dec 2023 07:00) (94% - 100%)    O2 Parameters below as of 21 Dec 2023 07:00  Patient On (Oxygen Delivery Method): room air      POCT Blood Glucose.: 152 mg/dL (20 Dec 2023 21:56)  POCT Blood Glucose.: 177 mg/dL (20 Dec 2023 17:05)  POCT Blood Glucose.: 157 mg/dL (20 Dec 2023 11:30)    CAPILLARY BLOOD GLUCOSE      POCT Blood Glucose.: 152 mg/dL (20 Dec 2023 21:56)    I&O's Summary    20 Dec 2023 07:01  -  21 Dec 2023 07:00  --------------------------------------------------------  IN: 802 mL / OUT: 1080 mL / NET: -278 mL      Daily     Daily Weight in k (21 Dec 2023 06:00)    PHYSICAL EXAMINATION:  GENERAL: No acute distress, well-developed  HEAD:  Atraumatic, Normocephalic  EYES: EOMI, PERRLA, conjunctiva and sclera clear  CHEST/LUNG: CTAB; No wheezes, rales, or rhonchi  HEART: Regular rate and rhythm. Normal S1/S2. No murmurs, rubs, or gallops  ABDOMEN: Soft, non-tender, non-distended; normal bowel sounds, no organomegaly  EXTREMITIES:  2+ peripheral pulses b/l, No clubbing, cyanosis, or edema  NEUROLOGY: A&O x 3, no focal deficits  SKIN: No rashes or lesions  Lines: R femoral IABP dsg c/d/i    LABS:                          12.1   7.21  )-----------( 181      ( 21 Dec 2023 02:02 )             36.2     12-    134<L>  |  104  |  32<H>  ----------------------------<  143<H>  4.3   |  19<L>  |  1.24    Ca    10.0      21 Dec 2023 02:02  Phos  3.9     12-  Mg     1.9     12-    TPro  6.9  /  Alb  3.9  /  TBili  0.2  /  DBili  x   /  AST  22  /  ALT  67<H>  /  AlkPhos  63  12-    LIVER FUNCTIONS - ( 21 Dec 2023 02:02 )  Alb: 3.9 g/dL / Pro: 6.9 g/dL / ALK PHOS: 63 U/L / ALT: 67 U/L / AST: 22 U/L / GGT: x           PT/INR - ( 21 Dec 2023 02:02 )   PT: 12.0 sec;   INR: 1.15 ratio         PTT - ( 21 Dec 2023 02:02 )  PTT:79.1 sec

## 2023-12-21 NOTE — PROGRESS NOTE ADULT - SUBJECTIVE AND OBJECTIVE BOX
LD VALENCIA  58y Male  MRN:[id number]    Patient is a 58y old  Male who presents with a chief complaint of NSTEMI (01 Nov 2023 20:29)    HPI:  57yo M w/ hx HTN, CAD w/ 1 stent in 2009, ICH (2008) presenting with abn ekg. Patient presented to [facility] where he was found to have STEMI, recommended to get cath however patient did not want to get it there so it left and came here.  Patient initially had cough, congestion, fever, was placed on antibiotics on Sunday.  Started feeling nauseous and had a presyncopal event after which he presented to ED last night.  Had chest pain as well.  Chest pain is midsternal.  Not currently having chest pain.  Received 4 aspirin 30 min pta. (01 Nov 2023 15:11)      Patient seen and evaluated at bedside in CCU. interval events noted    Interval HPI: remain in ccu. IABP in place        PAST MEDICAL & SURGICAL HISTORY:  HTN (hypertension)      CAD (coronary artery disease)  2009; stent      Intracranial hemorrhage  2008      Respiratory arrest  december 1st      Myocardial infarction, unspecified MI type, unspecified artery      History of coronary artery stent placement          REVIEW OF SYSTEMS:  as per hpi     VITALS:   ICU Vital Signs Last 24 Hrs  T(C): 36.8 (21 Dec 2023 11:00), Max: 36.9 (20 Dec 2023 19:00)  T(F): 98.2 (21 Dec 2023 11:00), Max: 98.5 (20 Dec 2023 19:00)  HR: 86 (21 Dec 2023 16:00) (71 - 91)  BP: --  BP(mean): --  ABP: --  ABP(mean): --  RR: 19 (21 Dec 2023 16:00) (14 - 23)  SpO2: 96% (21 Dec 2023 16:00) (95% - 100%)    O2 Parameters below as of 21 Dec 2023 16:00  Patient On (Oxygen Delivery Method): room air              PHYSICAL EXAM:  GENERAL: NAD, comfortable   HEAD:  Atraumatic, Normocephalic  EYES: EOMI, PERRLA, conjunctiva and sclera clear  NECK: Supple, No JVD   CHEST/LUNG: Clear to auscultation bilaterally; No wheeze  HEART: Regular rate and rhythm; No murmurs, rubs, or gallops  ABDOMEN: Soft, Nontender, Nondistended; Bowel sounds present  EXTREMITIES:  2+ Peripheral Pulses, No clubbing, cyanosis, or edema  NEUROLOGY: nonfocal  SKIN: +rash  +iabp    Consultant(s) Notes Reviewed:  [x ] YES  [ ] NO  Care Discussed with Consultants/Other Providers [ x] YES  [ ] NO    MEDS:   MEDICATIONS  (STANDING):  aspirin  chewable 81 milliGRAM(s) Oral daily  atorvastatin 80 milliGRAM(s) Oral at bedtime  bisacodyl 5 milliGRAM(s) Oral every 12 hours  budesonide  80 MICROgram(s)/formoterol 4.5 MICROgram(s) Inhaler 2 Puff(s) Inhalation two times a day  carvedilol 25 milliGRAM(s) Oral every 12 hours  chlorhexidine 2% Cloths 1 Application(s) Topical daily  heparin  Infusion 1300 Unit(s)/Hr (14 mL/Hr) IV Continuous <Continuous>  hepatitis B Vaccine - Adult (HEPLISAV-B) 20 MICROGram(s) IntraMuscular once  hydrALAZINE 50 milliGRAM(s) Oral every 8 hours  insulin glargine Injectable (LANTUS) 12 Unit(s) SubCutaneous at bedtime  insulin lispro (ADMELOG) corrective regimen sliding scale   SubCutaneous at bedtime  insulin lispro (ADMELOG) corrective regimen sliding scale   SubCutaneous three times a day before meals  insulin lispro Injectable (ADMELOG) 9 Unit(s) SubCutaneous three times a day before meals  isosorbide   dinitrate Tablet (ISORDIL) 30 milliGRAM(s) Oral three times a day  polyethylene glycol 3350 17 Gram(s) Oral two times a day  senna 2 Tablet(s) Oral at bedtime    MEDICATIONS  (PRN):  hydrOXYzine hydrochloride 25 milliGRAM(s) Oral two times a day PRN Anxiety      ALLERGIES:  penicillins (Unknown)      LABS:                                                                      12.1   7.21  )-----------( 181      ( 21 Dec 2023 02:02 )             36.2    12-21    134<L>  |  104  |  32<H>  ----------------------------<  143<H>  4.3   |  19<L>  |  1.24    Ca    10.0      21 Dec 2023 02:02  Phos  3.9     12-21  Mg     1.9     12-21    TPro  6.9  /  Alb  3.9  /  TBili  0.2  /  DBili  x   /  AST  22  /  ALT  67<H>  /  AlkPhos  63  12-21      < from: CT Abdomen and Pelvis No Cont (11.28.23 @ 03:38) >  IMPRESSION:  Mildly dilated colon and prominent but not overly dilated small bowel   with air-fluid levels. No discrete transition point. Findings are   suggestive of ileus.    Intra-aortic balloon pump with the inferior marker at the level of the   inferior mesenteric artery and the balloon overlying the origins of the   renal arteries, celiac artery, and superior mesenteric artery. Consider   repositioning. Concern for IABP positioning was discussed with LANETTE Hawthorne   on 11/28/2023 at 3:42 AM by Dr. Shepard.    < end of copied text >          < from: Colonoscopy (11.17.23 @ 10:35) >                                                                                                        Impression:          - The entire examined colon is normal on direct and retroflexion views.                       - No specimens collected.  Recommendation:      - Resume previous diet today.                       - No large polyps or masses detected, No objection from GI standpoint to                 proceed with heart transplantation/advanced heart therapies.                       - Please call back should any further questions or concerns arise.    < end of copied text >     < from: Xray Chest 1 View- PORTABLE-Urgent (11.01.23 @ 07:42) >    IMPRESSION:  Clear lungs.    ---End of Report ---        < end of copied text >  < from: TTE W or WO Ultrasound Enhancing Agent (11.01.23 @ 10:23) >  _____________________________     CONCLUSIONS:      1. Left ventricular cavity is moderately dilated. Left ventricular wall thickness is normal. Left ventricular systolic function is severely decreased with an ejection fraction of 32 % by Chinchilla's method of disks. Regional wall motion abnormalities present.   2. Multiple segmental abnormalities exist. See findings.   3. There is moderate (grade 2) left ventricular diastolic dysfunction, with indeterminant filling pressure.   4. Normal right ventricular cavity size, wall thickness, and systolic function.   5. No significant valvular disease.   6. No pericardial effusion seen.   7. Compared to the transthoracic echocardiogram performed on 1/25/2017 the areas of akinesis are unchanged but there has been a decline in LV systolic function with new areas of hypokinesis.    __________________________________________________________________    < end of copied text >  < from: TTE Limited W or WO Ultrasound Enhancing Agent (11.02.23 @ 07:41) >  __________________________     CONCLUSIONS:      1. After obtaining consent, Definity ultrasound enhancing agent was given for enhanced left ventricular opacification and improved delineation of the left ventricular endocardial borders. Left ventricular systolic function is severely decreased with a calculated ejection fraction of 22 % by the Chinchilla's biplane method of disks. There is a left ventricular thrombus.   2. Findings were discussed with Litzy BOSS on 11/2/2023 at 8.49am.   3. There is a left ventricular thrombus.    _________________________________________________________________    < end of copied text >   LD VALENCIA  58y Male  MRN:[id number]    Patient is a 58y old  Male who presents with a chief complaint of NSTEMI (01 Nov 2023 20:29)    HPI:  57yo M w/ hx HTN, CAD w/ 1 stent in 2009, ICH (2008) presenting with abn ekg. Patient presented to [facility] where he was found to have STEMI, recommended to get cath however patient did not want to get it there so it left and came here.  Patient initially had cough, congestion, fever, was placed on antibiotics on Sunday.  Started feeling nauseous and had a presyncopal event after which he presented to ED last night.  Had chest pain as well.  Chest pain is midsternal.  Not currently having chest pain.  Received 4 aspirin 30 min pta. (01 Nov 2023 15:11)      Patient seen and evaluated at bedside in CCU. interval events noted    Interval HPI: remain in ccu. IABP in place        PAST MEDICAL & SURGICAL HISTORY:  HTN (hypertension)      CAD (coronary artery disease)  2009; stent      Intracranial hemorrhage  2008      Respiratory arrest  december 1st      Myocardial infarction, unspecified MI type, unspecified artery      History of coronary artery stent placement          REVIEW OF SYSTEMS:  as per hpi     VITALS:   ICU Vital Signs Last 24 Hrs  T(C): 36.8 (21 Dec 2023 11:00), Max: 36.9 (20 Dec 2023 19:00)  T(F): 98.2 (21 Dec 2023 11:00), Max: 98.5 (20 Dec 2023 19:00)  HR: 86 (21 Dec 2023 16:00) (71 - 91)  BP: --  BP(mean): --  ABP: --  ABP(mean): --  RR: 19 (21 Dec 2023 16:00) (14 - 23)  SpO2: 96% (21 Dec 2023 16:00) (95% - 100%)    O2 Parameters below as of 21 Dec 2023 16:00  Patient On (Oxygen Delivery Method): room air              PHYSICAL EXAM:  GENERAL: NAD, comfortable   HEAD:  Atraumatic, Normocephalic  EYES: EOMI, PERRLA, conjunctiva and sclera clear  NECK: Supple, No JVD   CHEST/LUNG: Clear to auscultation bilaterally; No wheeze  HEART: Regular rate and rhythm; No murmurs, rubs, or gallops  ABDOMEN: Soft, Nontender, Nondistended; Bowel sounds present  EXTREMITIES:  2+ Peripheral Pulses, No clubbing, cyanosis, or edema  NEUROLOGY: nonfocal  SKIN: +rash  +iabp    Consultant(s) Notes Reviewed:  [x ] YES  [ ] NO  Care Discussed with Consultants/Other Providers [ x] YES  [ ] NO    MEDS:   MEDICATIONS  (STANDING):  aspirin  chewable 81 milliGRAM(s) Oral daily  atorvastatin 80 milliGRAM(s) Oral at bedtime  bisacodyl 5 milliGRAM(s) Oral every 12 hours  budesonide  80 MICROgram(s)/formoterol 4.5 MICROgram(s) Inhaler 2 Puff(s) Inhalation two times a day  carvedilol 25 milliGRAM(s) Oral every 12 hours  chlorhexidine 2% Cloths 1 Application(s) Topical daily  heparin  Infusion 1300 Unit(s)/Hr (14 mL/Hr) IV Continuous <Continuous>  hepatitis B Vaccine - Adult (HEPLISAV-B) 20 MICROGram(s) IntraMuscular once  hydrALAZINE 50 milliGRAM(s) Oral every 8 hours  insulin glargine Injectable (LANTUS) 12 Unit(s) SubCutaneous at bedtime  insulin lispro (ADMELOG) corrective regimen sliding scale   SubCutaneous at bedtime  insulin lispro (ADMELOG) corrective regimen sliding scale   SubCutaneous three times a day before meals  insulin lispro Injectable (ADMELOG) 9 Unit(s) SubCutaneous three times a day before meals  isosorbide   dinitrate Tablet (ISORDIL) 30 milliGRAM(s) Oral three times a day  polyethylene glycol 3350 17 Gram(s) Oral two times a day  senna 2 Tablet(s) Oral at bedtime    MEDICATIONS  (PRN):  hydrOXYzine hydrochloride 25 milliGRAM(s) Oral two times a day PRN Anxiety      ALLERGIES:  penicillins (Unknown)      LABS:                                                                      12.1   7.21  )-----------( 181      ( 21 Dec 2023 02:02 )             36.2    12-21    134<L>  |  104  |  32<H>  ----------------------------<  143<H>  4.3   |  19<L>  |  1.24    Ca    10.0      21 Dec 2023 02:02  Phos  3.9     12-21  Mg     1.9     12-21    TPro  6.9  /  Alb  3.9  /  TBili  0.2  /  DBili  x   /  AST  22  /  ALT  67<H>  /  AlkPhos  63  12-21      < from: CT Abdomen and Pelvis No Cont (11.28.23 @ 03:38) >  IMPRESSION:  Mildly dilated colon and prominent but not overly dilated small bowel   with air-fluid levels. No discrete transition point. Findings are   suggestive of ileus.    Intra-aortic balloon pump with the inferior marker at the level of the   inferior mesenteric artery and the balloon overlying the origins of the   renal arteries, celiac artery, and superior mesenteric artery. Consider   repositioning. Concern for IABP positioning was discussed with LANETTE Hawthorne   on 11/28/2023 at 3:42 AM by Dr. Shepard.    < end of copied text >          < from: Colonoscopy (11.17.23 @ 10:35) >                                                                                                        Impression:          - The entire examined colon is normal on direct and retroflexion views.                       - No specimens collected.  Recommendation:      - Resume previous diet today.                       - No large polyps or masses detected, No objection from GI standpoint to                 proceed with heart transplantation/advanced heart therapies.                       - Please call back should any further questions or concerns arise.    < end of copied text >     < from: Xray Chest 1 View- PORTABLE-Urgent (11.01.23 @ 07:42) >    IMPRESSION:  Clear lungs.    ---End of Report ---        < end of copied text >  < from: TTE W or WO Ultrasound Enhancing Agent (11.01.23 @ 10:23) >  _____________________________     CONCLUSIONS:      1. Left ventricular cavity is moderately dilated. Left ventricular wall thickness is normal. Left ventricular systolic function is severely decreased with an ejection fraction of 32 % by Chinchilla's method of disks. Regional wall motion abnormalities present.   2. Multiple segmental abnormalities exist. See findings.   3. There is moderate (grade 2) left ventricular diastolic dysfunction, with indeterminant filling pressure.   4. Normal right ventricular cavity size, wall thickness, and systolic function.   5. No significant valvular disease.   6. No pericardial effusion seen.   7. Compared to the transthoracic echocardiogram performed on 1/25/2017 the areas of akinesis are unchanged but there has been a decline in LV systolic function with new areas of hypokinesis.    __________________________________________________________________    < end of copied text >  < from: TTE Limited W or WO Ultrasound Enhancing Agent (11.02.23 @ 07:41) >  __________________________     CONCLUSIONS:      1. After obtaining consent, Definity ultrasound enhancing agent was given for enhanced left ventricular opacification and improved delineation of the left ventricular endocardial borders. Left ventricular systolic function is severely decreased with a calculated ejection fraction of 22 % by the Chinchilla's biplane method of disks. There is a left ventricular thrombus.   2. Findings were discussed with Litzy BOSS on 11/2/2023 at 8.49am.   3. There is a left ventricular thrombus.    _________________________________________________________________    < end of copied text >

## 2023-12-21 NOTE — CHART NOTE - NSCHARTNOTEFT_GEN_A_CORE
Removal of Femoral Sheath    Pulses in the right lower extremity are palpable. The patient was placed in the supine position. The insertion site was identified and the sutures were removed per protocol.  The 8 Citizen of [place] femoral sheath was then removed. Direct pressure was applied for  30 minutes.     Monitoring of the right groin and both lower extremities including neuro-vascular checks and vital signs every 15 minutes x 4, then every 30 minutes x 2, then every 1 hour was ordered.    Complications: None/Other    Comments: Removal of Femoral Sheath    Pulses in the right lower extremity are palpable. The patient was placed in the supine position. The insertion site was identified and the sutures were removed per protocol.  The 8 Palestinian femoral sheath was then removed. Direct pressure was applied for  30 minutes.     Monitoring of the right groin and both lower extremities including neuro-vascular checks and vital signs every 15 minutes x 4, then every 30 minutes x 2, then every 1 hour was ordered.    Complications: None/Other    Comments:

## 2023-12-21 NOTE — PROGRESS NOTE ADULT - SUBJECTIVE AND OBJECTIVE BOX
Behavioral Cardiology Progress Note     History of Present Illness: Mr. Hardy is a 58 year-old man with history of HTN, CAD (1 stent in 2009), ICH (2008) presented on 11/1 with abnormal EKG. Patient presented to [facility] where he was found to have STEMI, recommended to get cath however patient did not want treatment at OSH so left and came to [facility]. EKG here with ST depression in lateral leads and elevation in anterior leads. Prior to C found to be tachycardic, dyspneic, intubated. LHC 11/1 with chronic total occlusion of LAD and RCA, with elevated RA and PA pressures. TTE 11/1 with severely decreased EF 32%, s/p IABP 11/1.     Social history: Mr. Martin Hardy is a 58-year-old [place] American,  male with three children, including two sons and three daughters. 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 His wife, Brynn, works as an  in a public high school in [place]. Mr. Hardy reported three close friendships with men who live in [place]. Notably, one of these men, Dr. Brody Le, is Mr. Hardy’s healthcare proxy and PCP. Mr. Hardy also reported that these friends feel like family due to their closeness. Currently, Mr. Hardy lives in a condominium apartment in [place] with his wife and children. 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 Mr. Hardy noted his parents live in [place] as does one brother and one sister. Mr. Hardy has another sister, diagnosed with bipolar disorder, who lives in [place]. He described feeling distant from his siblings, as they lack a close relationship.      Substance use:    •	Tobacco: Denies current and past tobacco use.    •	Alcohol: Denies current and past alcohol use.   •	Drug: Denies current and past drug use.      Past psychiatric history: Mr. Hardy denied any history of self-harm and suicidal ideation, plan, or attempt. He also denied any history of violent thoughts or behaviors. Denies history of outpatient treatment with a therapist or psychiatrist. With this said, he reported a history of odd thinking related, health-related anxiety. For example, Mr. Hardy reported meeting with a physician about 12 years ago because he feared having a brain tumor. He believed that the presence of a brain tumor could explain his superior memory. Earlier this year, he revealed anxiety to his PCP, who prescribed Mr. Hardy Lorazepam (0.5mg PRN). Per medical chart, Mr. Hardy filled his prescription three separate times in the past year. He reported concern about dependency, given Lorazepam’s status as a controlled medication, and desire to avoid substance abuse.      Psychological assessment:                 Mental Status Exam: Seen lying in bed on IABP on CICU. Awake, alert. Oriented x4. Well related. Maintained good eye contact. Speech was normal volume, rate, tone. Mood euthymic. Affect full range. Thought process goal directed, content focused on current health status. Denies s/i. Insight into disease good. Immediate judgement good.        Dx: Adjustment Disorder with anxiety    No absolute psychological contraindications for pursuing heart transplant/LVAD with following recommendations:     Psychology will continue to follow for supportive therapy and CBT strategies to manage anxiety.  Seen by psychiatry (11/8), will benefit from follow up.   Maintain ongoing psychiatric follow-up.   Holistic Nurse.   Pet therapy if appropriate.   LVAD and heart transplant support group information provided.       30 minutes spent on total patient encounter       Behavioral Cardiology Progress Note     History of Present Illness: Mr. Hardy is a 58 year-old man with history of HTN, CAD (1 stent in 2009), ICH (2008) presented on 11/1 with abnormal EKG. Patient presented to [facility] where he was found to have STEMI, recommended to get cath however patient did not want treatment at OSH so left and came to [facility]. EKG here with ST depression in lateral leads and elevation in anterior leads. Prior to C found to be tachycardic, dyspneic, intubated. LHC 11/1 with chronic total occlusion of LAD and RCA, with elevated RA and PA pressures. TTE 11/1 with severely decreased EF 32%, s/p IABP 11/1.     Social history: Mr. Martin Hardy is a 58-year-old [place] American,  male with three children, including two sons and three daughters. 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 His wife, Brynn, works as an  in a public high school in [place]. Mr. Hardy reported three close friendships with men who live in [place]. Notably, one of these men, Dr. Brody Le, is Mr. Hardy’s healthcare proxy and PCP. Mr. Hardy also reported that these friends feel like family due to their closeness. Currently, Mr. Hardy lives in a condominium apartment in [place] with his wife and children. 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 Mr. Hardy noted his parents live in [place] as does one brother and one sister. Mr. Hardy has another sister, diagnosed with bipolar disorder, who lives in [place]. He described feeling distant from his siblings, as they lack a close relationship.      Substance use:    •	Tobacco: Denies current and past tobacco use.    •	Alcohol: Denies current and past alcohol use.   •	Drug: Denies current and past drug use.      Past psychiatric history: Mr. Hardy denied any history of self-harm and suicidal ideation, plan, or attempt. He also denied any history of violent thoughts or behaviors. Denies history of outpatient treatment with a therapist or psychiatrist. With this said, he reported a history of odd thinking related, health-related anxiety. For example, Mr. Hardy reported meeting with a physician about 12 years ago because he feared having a brain tumor. He believed that the presence of a brain tumor could explain his superior memory. Earlier this year, he revealed anxiety to his PCP, who prescribed Mr. Hardy Lorazepam (0.5mg PRN). Per medical chart, Mr. Hardy filled his prescription three separate times in the past year. He reported concern about dependency, given Lorazepam’s status as a controlled medication, and desire to avoid substance abuse.      Psychological assessment:                 Mental Status Exam: Seen lying in bed on IABP on CICU. Awake, alert. Oriented x4. Well related. Maintained good eye contact. Speech was normal volume, rate, tone. Mood euthymic. Affect full range. Thought process goal directed, content focused on current health status. Denies s/i. Insight into disease good. Immediate judgement good.        Dx: Adjustment Disorder with anxiety    No absolute psychological contraindications for pursuing heart transplant/LVAD with following recommendations:     Psychology will continue to follow for supportive therapy and CBT strategies to manage anxiety.  Seen by psychiatry (11/8), will benefit from follow up.   Maintain ongoing psychiatric follow-up.   Holistic Nurse.   Pet therapy if appropriate.   LVAD and heart transplant support group information provided.       30 minutes spent on total patient encounter

## 2023-12-21 NOTE — PROGRESS NOTE ADULT - ASSESSMENT
59 yo M with HFrEF (LVIDd 6.4 cm, LVEF 32%), CAD s/p PCI (2008), HTN, DMT2 (A1c 8.3%) and CVA s/p TPA (2018), recently treated for PNA who initially presented to [facility] via EMS after syncope reportedly requiring defibrilation. Treated for ACS but left AMA to come to [facility]. On arrival ECG with ST depression in lateral leads. Intubated 11/1 for respiratory failure. Found to have COVID. L/RHC 11/1revealing  of LAD and RCA, elevated filling pressures and CI of 1.5 prompting placement of IABP. Extubated 11/3. IABP was weaned to off 11/7, then the following day on 11/8, developed PMVT cardiac arrest with prompt CPR and defibrillation, started on IV Lidocaine and Amio. IABP ultimately replaced on 11/9 for worsening hypotension. TTE 11/11 revealing LV thrombus. Mild transaminitis. Now off amio.    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 AT evaluation launched 11/10, he is ABO A. He was discussed during TSC on 11/16 and was approved for listing, however was found to be Bacteremic with 11/17 Blc Cx growing mixed cultures Staph epi and Enterococcus faecalis and started on IV Vanco. Repeat cultures from 11/18 reveal NGTD and therefore was cleared by ID for listing.     GM + rods (Cutibacterium) in blood culture on 11/30 (reported on 12/4), restarted on vancomycin, and status 7, repeat cultures negative x48 hours (12/6).    Currently listed Status 2, ABO A, PRA 0% (12/12).    He currently appears euvolemic He appears adequately supported on IABP at 1:1, electrically quiescent on oral Amiodarone. Cr is normal. Possible donor identified but if not proceeding with OHT, will consider exchange of IABP given reported air leak alarms overnight and now has been in place for >4 weeks.     Cardiac Studies  11/21/23 TTE: limited unable to rule out endocarditis, technically difficult study  11/1123 TTE: LVEF 22% (global), LV thrombus, normal RV size and function, mild MR, trace TR  11/1/23  LHC showed pLAD 70 % stenosis in the ostium portion of the segment and 100 % in-stent restenosis and in mRCA, 100 % stenosis.   11/1/23 RHC; RA 23 Vw 24, PA 52/33/40, PCWP 30 Vw 33, AO 85/66/73, PA sat 54.4%, CO/CI (F) 2.96/1.44, IABP was placed during the cath through R common femoral artery.   11/1/23 TTE: LVIDd 6.4cm , LVEF 32%, regional WMA, grade II DD,  TAPSE 1.7cm, mld MR, trace TR,      57 yo M with HFrEF (LVIDd 6.4 cm, LVEF 32%), CAD s/p PCI (2008), HTN, DMT2 (A1c 8.3%) and CVA s/p TPA (2018), recently treated for PNA who initially presented to [facility] via EMS after syncope reportedly requiring defibrilation. Treated for ACS but left AMA to come to [facility]. On arrival ECG with ST depression in lateral leads. Intubated 11/1 for respiratory failure. Found to have COVID. L/RHC 11/1revealing  of LAD and RCA, elevated filling pressures and CI of 1.5 prompting placement of IABP. Extubated 11/3. IABP was weaned to off 11/7, then the following day on 11/8, developed PMVT cardiac arrest with prompt CPR and defibrillation, started on IV Lidocaine and Amio. IABP ultimately replaced on 11/9 for worsening hypotension. TTE 11/11 revealing LV thrombus. Mild transaminitis. Now off amio.    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 AT evaluation launched 11/10, he is ABO A. He was discussed during TSC on 11/16 and was approved for listing, however was found to be Bacteremic with 11/17 Blc Cx growing mixed cultures Staph epi and Enterococcus faecalis and started on IV Vanco. Repeat cultures from 11/18 reveal NGTD and therefore was cleared by ID for listing.     GM + rods (Cutibacterium) in blood culture on 11/30 (reported on 12/4), restarted on vancomycin, and status 7, repeat cultures negative x48 hours (12/6).    Currently listed Status 2, ABO A, PRA 0% (12/12).    He currently appears euvolemic He appears adequately supported on IABP at 1:1, electrically quiescent on oral Amiodarone. Cr is normal. Possible donor identified but if not proceeding with OHT, will consider exchange of IABP given reported air leak alarms overnight and now has been in place for >4 weeks.     Cardiac Studies  11/21/23 TTE: limited unable to rule out endocarditis, technically difficult study  11/1123 TTE: LVEF 22% (global), LV thrombus, normal RV size and function, mild MR, trace TR  11/1/23  LHC showed pLAD 70 % stenosis in the ostium portion of the segment and 100 % in-stent restenosis and in mRCA, 100 % stenosis.   11/1/23 RHC; RA 23 Vw 24, PA 52/33/40, PCWP 30 Vw 33, AO 85/66/73, PA sat 54.4%, CO/CI (F) 2.96/1.44, IABP was placed during the cath through R common femoral artery.   11/1/23 TTE: LVIDd 6.4cm , LVEF 32%, regional WMA, grade II DD,  TAPSE 1.7cm, mld MR, trace TR,

## 2023-12-21 NOTE — PROGRESS NOTE ADULT - SUBJECTIVE AND OBJECTIVE BOX
DEVORAH VALENCIA  MRN-[id number]  Patient is a 58y old  Male who presents with a chief complaint of NSTEMI, ADHF (21 Dec 2023 08:05)    HPI:  pt seen and approx 1:30 pm  in [facility]    57yo M w/ hx HTN, CAD w/ 1 stent in , ICH () presenting with abn ekg. Patient presented to [facility] where he was found to have STEMI, recommended to get cath however patient did not want to get it there so it left and came here.  Patient initially had cough, congestion, fever, was placed on antibiotics on .  Started feeling nauseous and had a presyncopal event after which he presented to ED last night.  Had chest pain as well.  Chest pain is midsternal.  Not currently having chest pain.  Received 4 aspirin 30 min pta. (2023 15:11)      Hospital Course:    24 HOUR EVENTS:    REVIEW OF SYSTEMS:    CONSTITUTIONAL: No weakness, fevers or chills  EYES/ENT: No visual changes;  No vertigo or throat pain   NECK: No pain or stiffness  RESPIRATORY: No cough, wheezing, hemoptysis; No shortness of breath  CARDIOVASCULAR: No chest pain or palpitations  GASTROINTESTINAL: No abdominal or epigastric pain. No nausea, vomiting, or hematemesis; No diarrhea or constipation. No melena or hematochezia.  GENITOURINARY: No dysuria, frequency or hematuria  NEUROLOGICAL: No numbness or weakness  SKIN: No itching, rashes      ICU Vital Signs Last 24 Hrs  T(C): 37 (21 Dec 2023 20:00), Max: 37 (21 Dec 2023 20:00)  T(F): 98.6 (21 Dec 2023 20:00), Max: 98.6 (21 Dec 2023 20:00)  HR: 74 (21 Dec 2023 20:00) (71 - 86)  BP: --  BP(mean): --  ABP: --  ABP(mean): --  RR: 20 (21 Dec 2023 20:00) (15 - 26)  SpO2: 98% (21 Dec 2023 20:00) (95% - 100%)    O2 Parameters below as of 21 Dec 2023 20:00  Patient On (Oxygen Delivery Method): room air            CVP(mm Hg): --  CO: --  CI: --  PA: --  PA(mean): --  PA(direct): --  PCWP: --  LA: --  RA: --  SVR: --  SVRI: --  PVR: --  PVRI: --  I&O's Summary    20 Dec 2023 07:01  -  21 Dec 2023 07:00  --------------------------------------------------------  IN: 816 mL / OUT: 1080 mL / NET: -264 mL    21 Dec 2023 07:01  -  21 Dec 2023 20:25  --------------------------------------------------------  IN: 380 mL / OUT: 625 mL / NET: -245 mL        CAPILLARY BLOOD GLUCOSE    CAPILLARY BLOOD GLUCOSE      POCT Blood Glucose.: 100 mg/dL (21 Dec 2023 18:18)      PHYSICAL EXAM:  GENERAL: No acute distress, well-developed  HEAD:  Atraumatic, Normocephalic  EYES: EOMI, PERRLA, conjunctiva and sclera clear  NECK: Supple, no lymphadenopathy, no JVD  CHEST/LUNG: CTAB; No wheezes, rales, or rhonchi  HEART: Regular rate and rhythm. Normal S1/S2. No murmurs, rubs, or gallops  ABDOMEN: Soft, non-tender, non-distended; normal bowel sounds, no organomegaly  EXTREMITIES:  2+ peripheral pulses b/l, No clubbing, cyanosis, or edema  NEUROLOGY: A&O x 3, no focal deficits  SKIN: No rashes or lesions    ============================I/O===========================   I&O's Detail    20 Dec 2023 07:  -  21 Dec 2023 07:00  --------------------------------------------------------  IN:    Heparin: 336 mL    Oral Fluid: 480 mL  Total IN: 816 mL    OUT:    Voided (mL): 1080 mL  Total OUT: 1080 mL    Total NET: -264 mL      21 Dec 2023 07:01  -  21 Dec 2023 20:25  --------------------------------------------------------  IN:    Heparin: 140 mL    Oral Fluid: 240 mL  Total IN: 380 mL    OUT:    Voided (mL): 625 mL  Total OUT: 625 mL    Total NET: -245 mL        ============================ LABS =========================                        12.1   7.  )-----------( 181      ( 21 Dec 2023 02:02 )             36.2         134<L>  |  104  |  32<H>  ----------------------------<  143<H>  4.3   |  19<L>  |  1.24    Ca    10.0      21 Dec 2023 02:02  Phos  3.9       Mg     1.9         TPro  6.9  /  Alb  3.9  /  TBili  0.2  /  DBili  x   /  AST  22  /  ALT  67<H>  /  AlkPhos  63                  LIVER FUNCTIONS - ( 21 Dec 2023 02:02 )  Alb: 3.9 g/dL / Pro: 6.9 g/dL / ALK PHOS: 63 U/L / ALT: 67 U/L / AST: 22 U/L / GGT: x           PT/INR - ( 21 Dec 2023 02:02 )   PT: 12.0 sec;   INR: 1.15 ratio         PTT - ( 21 Dec 2023 02:02 )  PTT:79.1 sec    Lactate, Blood: 0.9 mmol/L (12-21-23 @ 02:02)    Urinalysis Basic - ( 21 Dec 2023 02:02 )    Color: x / Appearance: x / SG: x / pH: x  Gluc: 143 mg/dL / Ketone: x  / Bili: x / Urobili: x   Blood: x / Protein: x / Nitrite: x   Leuk Esterase: x / RBC: x / WBC x   Sq Epi: x / Non Sq Epi: x / Bacteria: x      ======================Micro/Rad/Cardio=================  Telemtry: Reviewed   EKG: Reviewed  CXR: Reviewed  Culture: Reviewed   Echo:   Cath: Cardiac Cath Lab - Adult:   [facility]  Department of Cardiology  [address] [place] [address]  [phone]  Cath Lab Report -- Comprehensive Report  Patient: LD VALENCIA  Study date: 2017  Account number: [id number]  MR number: [id number]  : 1965  Gender: Male  Race: W  Case Physician(s):  Jairon Holguin M.D.  Referring Physician:  Luc Lynn M.D.  INDICATIONS: Unstable angina - CCS4.  HISTORY: The patient has a history of coronary artery disease. The patient  hashypertension and medication-treated dyslipidemia.  PROCEDURE:  --  Left heart catheterization with ventriculography.  --  Left coronary angiography.  --  Right coronary angiography.  TECHNIQUE: The risks and alternatives of the procedures and conscious  sedation were explained to the patient and informed consent was obtained.  Cardiac catheterization performed electively.  Local anesthetic given. Right radial artery access. A 6FR PRELUDE KIT was  inserted in the vessel. Left heart catheterization. Ventriculography was  performed. A 5FR FR4.0 EXPO catheter was utilized. Left coronary artery  angiography. The vessel was injected utilizing a 5FR FL3.5 EXPO catheter.  Right coronary artery angiography. The vessel was injected utilizing a 5FR  FR4.0 EXPO catheter. RADIATION EXPOSURE: 1.1 min.  CONTRAST GIVEN: Omnipaque 55 ml.  MEDICATIONS GIVEN: Midazolam, 1 mg, IV. Fentanyl, 25 mcg, IV. Verapamil  (Isoptin, Calan, Covera), 2.5 mg, IA. Heparin, 3000 units, IA.  VENTRICLES: Global left ventricular function was moderately depressed. EF  estimated was 40 %.  CORONARY VESSELS: The coronary circulation is right dominant.  LM:   --  LM: Normal.  LAD:   --  Proximal LAD: There was a 50 % stenosis.  CX:   --  Circumflex: Normal.  RCA:   --  Mid RCA: There was a 40 % stenosis.  --  Distal RCA: There was a 50 % stenosis.  COMPLICATIONS: There were no complications.  DIAGNOSTIC RECOMMENDATIONS: The patient should continue with the present  medications.  Prepared and signed by  Jairon Holguin M.D.  Signed 2017 12:20:13  HEMODYNAMIC TABLES  Pressures:  Baseline/ Room Air  Pressures:  - HR: 78  Pressures:  - Rhythm:  Pressures:  -- Aortic Pressure (S/D/M): --/--/99  Pressures:  -- Left Ventricle (s/edp): 157/39/--  Outputs:  Baseline/ Room Air  Outputs:  -- CALCULATIONS[age]  Outputs:  -- CALCULATIONS: Body Surface Area: 2.05  Outputs:  -- CALCULATIONS: Height in cm: 175.00  Outputs:  -- CALCULATIONS: Sex: Male  Outputs:  -- CALCULATIONS: Weight in k.40 (17 @ 21:55)    ======================================================  PAST MEDICAL & SURGICAL HISTORY:  HTN (hypertension)      CAD (coronary artery disease)  ; stent      Intracranial hemorrhage        Respiratory arrest        Myocardial infarction, unspecified MI type, unspecified artery      History of coronary artery stent placement  ====================ASSESSMENT ==============  58 male with HTN, CAD (s/p PCI ), HFrEF, CVA , and T2DM presenting with chest pressure and unknown tachycardia that was shocked x1, [facility]  found to have in-stent restenosis of pLAD and  of RCA, admitted to CICU for management of cardiogenic shock and ADHF requiring IABP  -, with hospital course c/b vfib arrest requiring reinsertion of IABP, currently listed for transplant status 2.    ====================== NEUROLOGY=====================  Anxiety  - No Seroquel/antipsychotics since vfib arrest and prolonged QTC  - Psych Eval, recommended SSRI, but pt. refused   - Psych recommending atarax PRN  - Continue to monitor mental status    PT/Conditioning  - Continue band exercises while on bedrest s/t IABP    ==================== RESPIRATORY======================  Acute Hypoxemic Respiratory Failure  - s/p x2 intubations for cardiogenic pulm edema and the in setting of cardiac arrest, resolved - extubated 11/10  - Currently maintaining >95% sats on room air  - Continue incentive spirometry and monitoring of sp02    Asthma  - c/w symbicort  - On trelegy at home  - Continue to monitor SpO2 with goal >94%    ====================CARDIOVASCULAR==================  Vfib arrest i/s/o ischemia  - Lido gtt off   - PO Amio load - total of 5g per EP complete   - Amio dc'd  for rising LFTs  - Keep K > 4, Mag > 2.2     Cardiogenic shock requiring IABP (- , -)  - Likely 2/2 NSTEMI and ADHF  -  LHC: pLAD 100 % in-stent restenosis & mRCA, 100 %. PCWP 30. IABP placed.  -  TTE: LV dilated. EF 32 %. Regional WMAs present, mod (grade 2) LV diastolic dysfunction  -  TTE: EF 22% and + LV thrombus  - IABP swapped  to RFA, continue 1:1 support - switched sensor to R radial a-line  due to poor sensing on groin line  - tentative IABP swap   - hydralazine 50 TID and ISD 30 TID for AL reduction  - c/w coreg 25 BID for GDMT  - James d/c'd due to elevated K levels  - UNOS status 2 as of     NSTEMI iso stent re-occlusion of pLAD and 100%  of RCA  - EKG on admission w/ LBBB  - DAPT: c/w ASA, Brilinta d/c'd per transplant w/u  - c/w lipitor 80  - cMR deferred given necessity of IABP  - CT sx not recommending CABG, undergoing AT eval    LV thrombus  - c/w heparin gtt w/ therapeutic PTT    ===================== RENAL =========================  Non-oliguric ARIC, resolved  - Cr   - Baseline Cr: -  - Renal US: no evidence of renal artery stenosis  - Trend BMP, lytes daily, replace as needed  - Continue Strict I/Os, avoid nephrotoxins    =============== GASTROINTESTINAL===================  Constipation/ileus, resolved  - regular diet  - bowel reg prn    ===================ENDO====================  Type 2 DM  - A1c 8.3, glucose controlled  - Continue lantus, premeal, low ISS  - continue FS monitoring    ===================HEMATOLOGIC/ONC ===================  - H/H & plts stable, no s/sx of bleeding  - Monitor H/H and plts q48h  - VTE PPX: heparin gtt    ==================INFECTIOUS DISEASE================  Positive blood culture, resolved  - Repeat BCx drawn  for leukocytosis to 12k - positive on , G+ rods in anaerobic bottle, suspect contaminant  - Repeat cultures x2 sent  per transplant ID recs - no growth to date  - Vancomycin by trough (-)  - Final microbial ID: Cutibacterium acnes, per ID this is likely to be a skin contaminant, vanc dc'd.   - Dental eval  to rule out infectious etiology that would have led to bacteremia, no significant findings on exam.     Enterococcus faecalis bacteremia, resolved  - BCx + for enterococcus faecalis x2, Staph epi x1 (likely contaminant)- pan sensitive   - Urine cx  + enterococcus faecalis  - BCx  no growth   - IABP site swapped to RFA   - s/p Vancomycin 1g q12h (-)  - CT A/P negative for infectious pathology    COVID, resolved  - Off airborne precautions     Pre-transplant ID w/u   - Trend ID recs for serologies   - Colonoscopy  - normal   - Chest CT  - improved LLL aeration  - s/p immunizations    ==================DERMATOLOGY================   Upper Extremity Rash  - Patient developed bilateral upper extremity rash after receiving Hepatitis A & B immunizations on   - Dermatology consulted  - not likely to be related to vanco  - Recommend clobetasol 0.05% BID to affected areas   - RVP repeated to rule out viral etiology, negative    Left neck mass  - US soft tissue:  lateral left neck 2.0 x 0.7 x 1.4 cm heterogeneous complex solid and cystic appearing lesion in subcutaneous soft tissue extending into subjacent strap muscle  - vasc cardiology consult: JOSE J venous duplex US ordered,  - Subcentimeter lymph nodes are present in the left neck and axilla, No evidence of left upper extremity DVT, Superficial thrombosis of the cephalic vein in the left upper arm.  - continue to monitor site    Lines: IABP (-), RRA (-)    Patient requires continuous monitoring with bedside rhythm monitoring, pulse ox monitoring, and intermittent blood gas analysis. Care plan discussed with ICU care team. Patient remained critical and at risk for life threatening decompensation.  Patient seen, examined and plan discussed with CCU team during rounds.     I have personally provided ____ minutes of critical care time excluding time spent on separate procedures, in addition to initial critical care time provided by the CICU Attending, Dr. Durand.    By signing my name below, I, Kalyn Sousa, attest that this documentation has been prepared under the direction and in the presence of Kitty Braden NP.  Electronically signed: Rosalba Booth, 23 @ 20:25    I, Kitty Braden , personally performed the services described in this documentation. all medical record entries made by the eileenibe were at my direction and in my presence. I have reviewed the chart and agree that the record reflects my personal performance and is accurate and complete  Electronically signed: Kitty Braden NP.     DEVORAH VALENCIA  MRN-[id number]  Patient is a 58y old  Male who presents with a chief complaint of NSTEMI, ADHF (21 Dec 2023 08:05)    HPI:  pt seen and approx 1:30 pm  in [facility]    57yo M w/ hx HTN, CAD w/ 1 stent in , ICH () presenting with abn ekg. Patient presented to [facility] where he was found to have STEMI, recommended to get cath however patient did not want to get it there so it left and came here.  Patient initially had cough, congestion, fever, was placed on antibiotics on .  Started feeling nauseous and had a presyncopal event after which he presented to ED last night.  Had chest pain as well.  Chest pain is midsternal.  Not currently having chest pain.  Received 4 aspirin 30 min pta. (2023 15:11)      Hospital Course:    24 HOUR EVENTS:    REVIEW OF SYSTEMS:    CONSTITUTIONAL: No weakness, fevers or chills  EYES/ENT: No visual changes;  No vertigo or throat pain   NECK: No pain or stiffness  RESPIRATORY: No cough, wheezing, hemoptysis; No shortness of breath  CARDIOVASCULAR: No chest pain or palpitations  GASTROINTESTINAL: No abdominal or epigastric pain. No nausea, vomiting, or hematemesis; No diarrhea or constipation. No melena or hematochezia.  GENITOURINARY: No dysuria, frequency or hematuria  NEUROLOGICAL: No numbness or weakness  SKIN: No itching, rashes      ICU Vital Signs Last 24 Hrs  T(C): 37 (21 Dec 2023 20:00), Max: 37 (21 Dec 2023 20:00)  T(F): 98.6 (21 Dec 2023 20:00), Max: 98.6 (21 Dec 2023 20:00)  HR: 74 (21 Dec 2023 20:00) (71 - 86)  BP: --  BP(mean): --  ABP: --  ABP(mean): --  RR: 20 (21 Dec 2023 20:00) (15 - 26)  SpO2: 98% (21 Dec 2023 20:00) (95% - 100%)    O2 Parameters below as of 21 Dec 2023 20:00  Patient On (Oxygen Delivery Method): room air            CVP(mm Hg): --  CO: --  CI: --  PA: --  PA(mean): --  PA(direct): --  PCWP: --  LA: --  RA: --  SVR: --  SVRI: --  PVR: --  PVRI: --  I&O's Summary    20 Dec 2023 07:01  -  21 Dec 2023 07:00  --------------------------------------------------------  IN: 816 mL / OUT: 1080 mL / NET: -264 mL    21 Dec 2023 07:01  -  21 Dec 2023 20:25  --------------------------------------------------------  IN: 380 mL / OUT: 625 mL / NET: -245 mL        CAPILLARY BLOOD GLUCOSE    CAPILLARY BLOOD GLUCOSE      POCT Blood Glucose.: 100 mg/dL (21 Dec 2023 18:18)      PHYSICAL EXAM:  GENERAL: No acute distress, well-developed  HEAD:  Atraumatic, Normocephalic  EYES: EOMI, PERRLA, conjunctiva and sclera clear  NECK: Supple, no lymphadenopathy, no JVD  CHEST/LUNG: CTAB; No wheezes, rales, or rhonchi  HEART: Regular rate and rhythm. Normal S1/S2. No murmurs, rubs, or gallops  ABDOMEN: Soft, non-tender, non-distended; normal bowel sounds, no organomegaly  EXTREMITIES:  2+ peripheral pulses b/l, No clubbing, cyanosis, or edema  NEUROLOGY: A&O x 3, no focal deficits  SKIN: No rashes or lesions    ============================I/O===========================   I&O's Detail    20 Dec 2023 07:  -  21 Dec 2023 07:00  --------------------------------------------------------  IN:    Heparin: 336 mL    Oral Fluid: 480 mL  Total IN: 816 mL    OUT:    Voided (mL): 1080 mL  Total OUT: 1080 mL    Total NET: -264 mL      21 Dec 2023 07:01  -  21 Dec 2023 20:25  --------------------------------------------------------  IN:    Heparin: 140 mL    Oral Fluid: 240 mL  Total IN: 380 mL    OUT:    Voided (mL): 625 mL  Total OUT: 625 mL    Total NET: -245 mL        ============================ LABS =========================                        12.1   7.  )-----------( 181      ( 21 Dec 2023 02:02 )             36.2         134<L>  |  104  |  32<H>  ----------------------------<  143<H>  4.3   |  19<L>  |  1.24    Ca    10.0      21 Dec 2023 02:02  Phos  3.9       Mg     1.9         TPro  6.9  /  Alb  3.9  /  TBili  0.2  /  DBili  x   /  AST  22  /  ALT  67<H>  /  AlkPhos  63                  LIVER FUNCTIONS - ( 21 Dec 2023 02:02 )  Alb: 3.9 g/dL / Pro: 6.9 g/dL / ALK PHOS: 63 U/L / ALT: 67 U/L / AST: 22 U/L / GGT: x           PT/INR - ( 21 Dec 2023 02:02 )   PT: 12.0 sec;   INR: 1.15 ratio         PTT - ( 21 Dec 2023 02:02 )  PTT:79.1 sec    Lactate, Blood: 0.9 mmol/L (12-21-23 @ 02:02)    Urinalysis Basic - ( 21 Dec 2023 02:02 )    Color: x / Appearance: x / SG: x / pH: x  Gluc: 143 mg/dL / Ketone: x  / Bili: x / Urobili: x   Blood: x / Protein: x / Nitrite: x   Leuk Esterase: x / RBC: x / WBC x   Sq Epi: x / Non Sq Epi: x / Bacteria: x      ======================Micro/Rad/Cardio=================  Telemtry: Reviewed   EKG: Reviewed  CXR: Reviewed  Culture: Reviewed   Echo:   Cath: Cardiac Cath Lab - Adult:   [facility]  Department of Cardiology  [address] [place] [address]  [phone]  Cath Lab Report -- Comprehensive Report  Patient: LD VALENCIA  Study date: 2017  Account number: [id number]  MR number: [id number]  : 1965  Gender: Male  Race: W  Case Physician(s):  Jairon Hloguin M.D.  Referring Physician:  Luc Lynn M.D.  INDICATIONS: Unstable angina - CCS4.  HISTORY: The patient has a history of coronary artery disease. The patient  hashypertension and medication-treated dyslipidemia.  PROCEDURE:  --  Left heart catheterization with ventriculography.  --  Left coronary angiography.  --  Right coronary angiography.  TECHNIQUE: The risks and alternatives of the procedures and conscious  sedation were explained to the patient and informed consent was obtained.  Cardiac catheterization performed electively.  Local anesthetic given. Right radial artery access. A 6FR PRELUDE KIT was  inserted in the vessel. Left heart catheterization. Ventriculography was  performed. A 5FR FR4.0 EXPO catheter was utilized. Left coronary artery  angiography. The vessel was injected utilizing a 5FR FL3.5 EXPO catheter.  Right coronary artery angiography. The vessel was injected utilizing a 5FR  FR4.0 EXPO catheter. RADIATION EXPOSURE: 1.1 min.  CONTRAST GIVEN: Omnipaque 55 ml.  MEDICATIONS GIVEN: Midazolam, 1 mg, IV. Fentanyl, 25 mcg, IV. Verapamil  (Isoptin, Calan, Covera), 2.5 mg, IA. Heparin, 3000 units, IA.  VENTRICLES: Global left ventricular function was moderately depressed. EF  estimated was 40 %.  CORONARY VESSELS: The coronary circulation is right dominant.  LM:   --  LM: Normal.  LAD:   --  Proximal LAD: There was a 50 % stenosis.  CX:   --  Circumflex: Normal.  RCA:   --  Mid RCA: There was a 40 % stenosis.  --  Distal RCA: There was a 50 % stenosis.  COMPLICATIONS: There were no complications.  DIAGNOSTIC RECOMMENDATIONS: The patient should continue with the present  medications.  Prepared and signed by  Jarion Holguin M.D.  Signed 2017 12:20:13  HEMODYNAMIC TABLES  Pressures:  Baseline/ Room Air  Pressures:  - HR: 78  Pressures:  - Rhythm:  Pressures:  -- Aortic Pressure (S/D/M): --/--/99  Pressures:  -- Left Ventricle (s/edp): 157/39/--  Outputs:  Baseline/ Room Air  Outputs:  -- CALCULATIONS[age]  Outputs:  -- CALCULATIONS: Body Surface Area: 2.05  Outputs:  -- CALCULATIONS: Height in cm: 175.00  Outputs:  -- CALCULATIONS: Sex: Male  Outputs:  -- CALCULATIONS: Weight in k.40 (17 @ 21:55)    ======================================================  PAST MEDICAL & SURGICAL HISTORY:  HTN (hypertension)      CAD (coronary artery disease)  ; stent      Intracranial hemorrhage        Respiratory arrest        Myocardial infarction, unspecified MI type, unspecified artery      History of coronary artery stent placement  ====================ASSESSMENT ==============  58 male with HTN, CAD (s/p PCI ), HFrEF, CVA , and T2DM presenting with chest pressure and unknown tachycardia that was shocked x1, [facility]  found to have in-stent restenosis of pLAD and  of RCA, admitted to CICU for management of cardiogenic shock and ADHF requiring IABP  -, with hospital course c/b vfib arrest requiring reinsertion of IABP, currently listed for transplant status 2.    ====================== NEUROLOGY=====================  Anxiety  - No Seroquel/antipsychotics since vfib arrest and prolonged QTC  - Psych Eval, recommended SSRI, but pt. refused   - Psych recommending atarax PRN  - Continue to monitor mental status    PT/Conditioning  - Continue band exercises while on bedrest s/t IABP    ==================== RESPIRATORY======================  Acute Hypoxemic Respiratory Failure  - s/p x2 intubations for cardiogenic pulm edema and the in setting of cardiac arrest, resolved - extubated 11/10  - Currently maintaining >95% sats on room air  - Continue incentive spirometry and monitoring of sp02    Asthma  - c/w symbicort  - On trelegy at home  - Continue to monitor SpO2 with goal >94%    ====================CARDIOVASCULAR==================  Vfib arrest i/s/o ischemia  - Lido gtt off   - PO Amio load - total of 5g per EP complete   - Amio dc'd  for rising LFTs  - Keep K > 4, Mag > 2.2     Cardiogenic shock requiring IABP (- , -)  - Likely 2/2 NSTEMI and ADHF  -  LHC: pLAD 100 % in-stent restenosis & mRCA, 100 %. PCWP 30. IABP placed.  -  TTE: LV dilated. EF 32 %. Regional WMAs present, mod (grade 2) LV diastolic dysfunction  -  TTE: EF 22% and + LV thrombus  - IABP swapped  to RFA, continue 1:1 support - switched sensor to R radial a-line  due to poor sensing on groin line  - tentative IABP swap   - hydralazine 50 TID and ISD 30 TID for AL reduction  - c/w coreg 25 BID for GDMT  - James d/c'd due to elevated K levels  - UNOS status 2 as of     NSTEMI iso stent re-occlusion of pLAD and 100%  of RCA  - EKG on admission w/ LBBB  - DAPT: c/w ASA, Brilinta d/c'd per transplant w/u  - c/w lipitor 80  - cMR deferred given necessity of IABP  - CT sx not recommending CABG, undergoing AT eval    LV thrombus  - c/w heparin gtt w/ therapeutic PTT    ===================== RENAL =========================  Non-oliguric ARIC, resolved  - Cr   - Baseline Cr: -  - Renal US: no evidence of renal artery stenosis  - Trend BMP, lytes daily, replace as needed  - Continue Strict I/Os, avoid nephrotoxins    =============== GASTROINTESTINAL===================  Constipation/ileus, resolved  - regular diet  - bowel reg prn    ===================ENDO====================  Type 2 DM  - A1c 8.3, glucose controlled  - Continue lantus, premeal, low ISS  - continue FS monitoring    ===================HEMATOLOGIC/ONC ===================  - H/H & plts stable, no s/sx of bleeding  - Monitor H/H and plts q48h  - VTE PPX: heparin gtt    ==================INFECTIOUS DISEASE================  Positive blood culture, resolved  - Repeat BCx drawn  for leukocytosis to 12k - positive on , G+ rods in anaerobic bottle, suspect contaminant  - Repeat cultures x2 sent  per transplant ID recs - no growth to date  - Vancomycin by trough (-)  - Final microbial ID: Cutibacterium acnes, per ID this is likely to be a skin contaminant, vanc dc'd.   - Dental eval  to rule out infectious etiology that would have led to bacteremia, no significant findings on exam.     Enterococcus faecalis bacteremia, resolved  - BCx + for enterococcus faecalis x2, Staph epi x1 (likely contaminant)- pan sensitive   - Urine cx  + enterococcus faecalis  - BCx  no growth   - IABP site swapped to RFA   - s/p Vancomycin 1g q12h (-)  - CT A/P negative for infectious pathology    COVID, resolved  - Off airborne precautions     Pre-transplant ID w/u   - Trend ID recs for serologies   - Colonoscopy  - normal   - Chest CT  - improved LLL aeration  - s/p immunizations    ==================DERMATOLOGY================   Upper Extremity Rash  - Patient developed bilateral upper extremity rash after receiving Hepatitis A & B immunizations on   - Dermatology consulted  - not likely to be related to vanco  - Recommend clobetasol 0.05% BID to affected areas   - RVP repeated to rule out viral etiology, negative    Left neck mass  - US soft tissue:  lateral left neck 2.0 x 0.7 x 1.4 cm heterogeneous complex solid and cystic appearing lesion in subcutaneous soft tissue extending into subjacent strap muscle  - vasc cardiology consult: JOSE J venous duplex US ordered,  - Subcentimeter lymph nodes are present in the left neck and axilla, No evidence of left upper extremity DVT, Superficial thrombosis of the cephalic vein in the left upper arm.  - continue to monitor site    Lines: IABP (-), RRA (-)    Patient requires continuous monitoring with bedside rhythm monitoring, pulse ox monitoring, and intermittent blood gas analysis. Care plan discussed with ICU care team. Patient remained critical and at risk for life threatening decompensation.  Patient seen, examined and plan discussed with CCU team during rounds.     I have personally provided ____ minutes of critical care time excluding time spent on separate procedures, in addition to initial critical care time provided by the CICU Attending, Dr. Durand.    By signing my name below, I, Kalyn Sousa, attest that this documentation has been prepared under the direction and in the presence of Kitty Braden NP.  Electronically signed: Rosalba Booth, 23 @ 20:25    I, Kitty Braden , personally performed the services described in this documentation. all medical record entries made by the eileenibe were at my direction and in my presence. I have reviewed the chart and agree that the record reflects my personal performance and is accurate and complete  Electronically signed: Kitty Braden NP.     DEVORAH VALENCIA  MRN-[id number]  Patient is a 58y old  Male who presents with a chief complaint of NSTEMI, ADHF (21 Dec 2023 08:05)    HPI:  57 yo M with HFrEF (LVIDd 6.4 cm, LVEF 32%), CAD s/p PCI (), HTN, DMT2 (A1c 8.3%) and CVA s/p TPA (), recently treated for PNA who initially presented to [facility] via EMS after syncope reportedly requiring defibrilation. Treated for ACS but left AMA to come to [facility]. On arrival ECG with ST depression in lateral leads. Intubated  for respiratory failure. Found to have COVID. L/RHC evealing  of LAD and RCA, elevated filling pressures and CI of 1.5 prompting placement of IABP. Extubated 11/3. IABP was weaned to off , then the following day on , developed PMVT cardiac arrest with prompt CPR and defibrillation, started on IV Lidocaine and Amio. IABP ultimately replaced on  for worsening hypotension. TTE  revealing LV thrombus. Mild transaminitis. Now off amio.    24 HOUR EVENTS: IABP changed to L fem. right femoral sheath removed    REVIEW OF SYSTEMS:  CONSTITUTIONAL: No weakness, fevers or chills  EYES/ENT: No visual changes;  No vertigo or throat pain   NECK: No pain or stiffness  RESPIRATORY: No cough, wheezing, hemoptysis; No shortness of breath  CARDIOVASCULAR: No chest pain or palpitations  GASTROINTESTINAL: No abdominal or epigastric pain. No nausea, vomiting, or hematemesis; No diarrhea or constipation. No melena or hematochezia.  GENITOURINARY: No dysuria, frequency or hematuria  NEUROLOGICAL: No numbness or weakness  SKIN: No itching, rashes      ICU Vital Signs Last 24 Hrs  T(C): 37 (21 Dec 2023 20:00), Max: 37 (21 Dec 2023 20:00)  T(F): 98.6 (21 Dec 2023 20:00), Max: 98.6 (21 Dec 2023 20:00)  HR: 74 (21 Dec 2023 20:00) (71 - 86)  BP: --  BP(mean): --  ABP: --  ABP(mean): --  RR: 20 (21 Dec 2023 20:00) (15 - 26)  SpO2: 98% (21 Dec 2023 20:00) (95% - 100%)    O2 Parameters below as of 21 Dec 2023 20:00  Patient On (Oxygen Delivery Method): room air    I&O's Summary    20 Dec 2023 07:01  -  21 Dec 2023 07:00  --------------------------------------------------------  IN: 816 mL / OUT: 1080 mL / NET: -264 mL    21 Dec 2023 07:01  -  21 Dec 2023 20:25  --------------------------------------------------------  IN: 380 mL / OUT: 625 mL / NET: -245 mL    CAPILLARY BLOOD GLUCOSE      POCT Blood Glucose.: 100 mg/dL (21 Dec 2023 18:18)      PHYSICAL EXAM:  GENERAL: No acute distress, well-developed  HEAD:  Atraumatic, Normocephalic  EYES: EOMI, PERRLA, conjunctiva and sclera clear  NECK: Supple, no JVD  CHEST/LUNG: CTAB; No wheezes, rales, or rhonchi  HEART: Regular rate and rhythm. Normal S1/S2. No murmurs, rubs, or gallops  ABDOMEN: Soft, non-tender, non-distended; normal bowel sounds  EXTREMITIES:  2+ peripheral pulses b/l, No clubbing, cyanosis, or edema  NEUROLOGY: A&O x 3, no focal deficits  SKIN: upper extremity rash improving.   Lines: L groin IABP clean, dry and intact. R femoral sheath site c/d/i.    ============================I/O===========================   I&O's Detail    20 Dec 2023 07:01  -  21 Dec 2023 07:00  --------------------------------------------------------  IN:    Heparin: 336 mL    Oral Fluid: 480 mL  Total IN: 816 mL    OUT:    Voided (mL): 1080 mL  Total OUT: 1080 mL    Total NET: -264 mL      21 Dec 2023 07:01  -  21 Dec 2023 20:25  --------------------------------------------------------  IN:    Heparin: 140 mL    Oral Fluid: 240 mL  Total IN: 380 mL    OUT:    Voided (mL): 625 mL  Total OUT: 625 mL    Total NET: -245 mL        ============================ LABS =========================                        12.1   7.21  )-----------( 181      ( 21 Dec 2023 02:02 )             36.2     12-    134<L>  |  104  |  32<H>  ----------------------------<  143<H>  4.3   |  19<L>  |  1.24    Ca    10.0      21 Dec 2023 02:02  Phos  3.9     12-  Mg     1.9     -    TPro  6.9  /  Alb  3.9  /  TBili  0.2  /  DBili  x   /  AST  22  /  ALT  67<H>  /  AlkPhos  63  12-        LIVER FUNCTIONS - ( 21 Dec 2023 02:02 )  Alb: 3.9 g/dL / Pro: 6.9 g/dL / ALK PHOS: 63 U/L / ALT: 67 U/L / AST: 22 U/L / GGT: x           PT/INR - ( 21 Dec 2023 02:02 )   PT: 12.0 sec;   INR: 1.15 ratio         PTT - ( 21 Dec 2023 02:02 )  PTT:79.1 sec    Lactate, Blood: 0.9 mmol/L (23 @ 02:02)    Urinalysis Basic - ( 21 Dec 2023 02:02 )    Color: x / Appearance: x / SG: x / pH: x  Gluc: 143 mg/dL / Ketone: x  / Bili: x / Urobili: x   Blood: x / Protein: x / Nitrite: x   Leuk Esterase: x / RBC: x / WBC x   Sq Epi: x / Non Sq Epi: x / Bacteria: x      ======================Micro/Rad/Cardio=================  Telemtry: Reviewed   EKG: Reviewed  CXR: Reviewed  Culture: Reviewed   Echo:   Cath: Cardiac Cath Lab - Adult:   [facility]  Department of Cardiology  [address] [place] [address]  [phone]  Cath Lab Report -- Comprehensive Report  Patient: LD VALENCIA  Study date: 2017  Account number: [id number]  MR number: [id number]  : 1965  Gender: Male  Race: W  Case Physician(s):  Jairon Holguin M.D.  Referring Physician:  Luc Lynn M.D.  INDICATIONS: Unstable angina - CCS4.  HISTORY: The patient has a history of coronary artery disease. The patient  hashypertension and medication-treated dyslipidemia.  PROCEDURE:  --  Left heart catheterization with ventriculography.  --  Left coronary angiography.  --  Right coronary angiography.  TECHNIQUE: The risks and alternatives of the procedures and conscious  sedation were explained to the patient and informed consent was obtained.  Cardiac catheterization performed electively.  Local anesthetic given. Right radial artery access. A 6FR PRELUDE KIT was  inserted in the vessel. Left heart catheterization. Ventriculography was  performed. A 5FR FR4.0 EXPO catheter was utilized. Left coronary artery  angiography. The vessel was injected utilizing a 5FR FL3.5 EXPO catheter.  Right coronary artery angiography. The vessel was injected utilizing a 5FR  FR4.0 EXPO catheter. RADIATION EXPOSURE: 1.1 min.  CONTRAST GIVEN: Omnipaque 55 ml.  MEDICATIONS GIVEN: Midazolam, 1 mg, IV. Fentanyl, 25 mcg, IV. Verapamil  (Isoptin, Calan, Covera), 2.5 mg, IA. Heparin, 3000 units, IA.  VENTRICLES: Global left ventricular function was moderately depressed. EF  estimated was 40 %.  CORONARY VESSELS: The coronary circulation is right dominant.  LM:   --  LM: Normal.  LAD:   --  Proximal LAD: There was a 50 % stenosis.  CX:   --  Circumflex: Normal.  RCA:   --  Mid RCA: There was a 40 % stenosis.  --  Distal RCA: There was a 50 % stenosis.  COMPLICATIONS: There were no complications.  DIAGNOSTIC RECOMMENDATIONS: The patient should continue with the present  medications.  Prepared and signed by  Jairon Holguin M.D.  Signed 2017 12:20:13  HEMODYNAMIC TABLES  Pressures:  Baseline/ Room Air  Pressures:  - HR: 78  Pressures:  - Rhythm:  Pressures:  -- Aortic Pressure (S/D/M): --/--/99  Pressures:  -- Left Ventricle (s/edp): 157/39/--  Outputs:  Baseline/ Room Air  Outputs:  -- CALCULATIONS[age]  Outputs:  -- CALCULATIONS: Body Surface Area: 2.05  Outputs:  -- CALCULATIONS: Height in cm: 175.00  Outputs:  -- CALCULATIONS: Sex: Male  Outputs:  -- CALCULATIONS: Weight in k.40 (17 @ 21:55)    ======================================================  PAST MEDICAL & SURGICAL HISTORY:  HTN (hypertension)      CAD (coronary artery disease)  ; stent      Intracranial hemorrhage        Respiratory arrest        Myocardial infarction, unspecified MI type, unspecified artery      History of coronary artery stent placement  ====================ASSESSMENT ==============  58 male with HTN, CAD (s/p PCI ), HFrEF, CVA , and T2DM presenting with chest pressure and unknown tachycardia that was shocked x1, [facility]  found to have in-stent restenosis of pLAD and  of RCA, admitted to CICU for management of cardiogenic shock and ADHF requiring IABP  -, with hospital course c/b vfib arrest requiring reinsertion of IABP, currently listed for transplant status 2.    ====================== NEUROLOGY=====================  Anxiety  - No Seroquel/antipsychotics since vfib arrest and prolonged QTC  - Psych Eval, recommended SSRI, but pt. refused   - Psych recommending atarax PRN  - Continue to monitor mental status    PT/Conditioning  - Continue band exercises while on bedrest s/t IABP    ==================== RESPIRATORY======================  Acute Hypoxemic Respiratory Failure  - s/p x2 intubations for cardiogenic pulm edema and the in setting of cardiac arrest, resolved - extubated 11/10  - Currently maintaining >95% sats on room air  - Continue incentive spirometry and monitoring of sp02    Asthma  - c/w symbicort  - On trelegy at home  - Continue to monitor SpO2 with goal >94%    ====================CARDIOVASCULAR==================  Vfib arrest i/s/o ischemia  - Lido gtt off   - PO Amio load - total of 5g per EP complete   - Amio dc'd  for rising LFTs  - Keep K > 4, Mag > 2.2     Cardiogenic shock requiring IABP (- , -)  - Likely 2/2 NSTEMI and ADHF  -  LHC: pLAD 100 % in-stent restenosis & mRCA, 100 %. PCWP 30. IABP placed.  -  TTE: LV dilated. EF 32 %. Regional WMAs present, mod (grade 2) LV diastolic dysfunction  -  TTE: EF 22% and + LV thrombus  - appears euvolemic  - IABP swapped to LFA , continue 1:1 support  - hydralazine 50 TID and ISD 30 TID for AL reduction  - c/w coreg 25 BID for GDMT  - James d/c'd due to elevated K levels  - UNOS status 2 as of     NSTEMI iso stent re-occlusion of pLAD and 100%  of RCA  - EKG on admission w/ LBBB  - DAPT: c/w ASA, Brilinta d/c'd per transplant w/u  - c/w lipitor 80  - cMR deferred given necessity of IABP  - CT sx not recommending CABG, undergoing AT eval    LV thrombus  - c/w heparin gtt w/ therapeutic PTT    ===================== RENAL =========================  Non-oliguric ARIC, resolved  - Cr   - Baseline Cr:   - Renal US: no evidence of renal artery stenosis  - Trend BMP, lytes daily, replace as needed  - Continue Strict I/Os, avoid nephrotoxins    =============== GASTROINTESTINAL===================  Constipation/ileus, resolved  - regular diet  - bowel reg prn    ===================ENDO====================  Type 2 DM  - A1c 8.3, glucose controlled  - Continue lantus, premeal, low ISS  - continue FS monitoring    ===================HEMATOLOGIC/ONC ===================  - H/H & plts stable, no s/sx of bleeding  - Monitor H/H and plts q48h  - VTE PPX: heparin gtt    ==================INFECTIOUS DISEASE================  Positive blood culture, resolved  - Repeat BCx drawn  for leukocytosis to 12k - positive on , G+ rods in anaerobic bottle, suspect contaminant  - Repeat cultures x2 sent  per transplant ID recs - no growth to date  - Vancomycin by trough (-)  - Final microbial ID: Cutibacterium acnes, per ID this is likely to be a skin contaminant, vanc dc'd.   - Dental eval  to rule out infectious etiology that would have led to bacteremia, no significant findings on exam.     Enterococcus faecalis bacteremia, resolved  - BCx + for enterococcus faecalis x2, Staph epi x1 (likely contaminant)- pan sensitive   - Urine cx  + enterococcus faecalis  - BCx  no growth   - IABP site swapped to RFA   - s/p Vancomycin 1g q12h (-)  - CT A/P negative for infectious pathology    COVID, resolved  - Off airborne precautions     Pre-transplant ID w/u   - Trend ID recs for serologies   - Colonoscopy  - normal   - Chest CT  - improved LLL aeration  - s/p immunizations    ==================DERMATOLOGY================   Upper Extremity Rash  - Patient developed bilateral upper extremity rash after receiving Hepatitis A & B immunizations on   - Dermatology consulted  - not likely to be related to vanco  - Recommend clobetasol 0.05% BID to affected areas   - RVP repeated to rule out viral etiology, negative    Left neck mass  - US soft tissue:  lateral left neck 2.0 x 0.7 x 1.4 cm heterogeneous complex solid and cystic appearing lesion in subcutaneous soft tissue extending into subjacent strap muscle  - vasc cardiology consult: TOMMIEE venous duplex US ordered,  - Subcentimeter lymph nodes are present in the left neck and axilla, No evidence of left upper extremity DVT, Superficial thrombosis of the cephalic vein in the left upper arm.  - continue to monitor site    Lines: RFA IABP (-), RRA (-), LFA IABP (-    ======================= DISPOSITION  =====================  [X] Critical   [ ] Guarded    [ ] Stable    [X] Maintain in CICU  [ ] Downgrade to Telemtry  [ ] Discharge Home      Patient requires continuous monitoring with bedside rhythm monitoring, pulse ox monitoring, and intermittent blood gas analysis. Care plan discussed with ICU care team. Patient remained critical and at risk for life threatening decompensation.  Patient seen, examined and plan discussed with CCU team during rounds.     I have personally provided 30 minutes of critical care time excluding time spent on separate procedures, in addition to initial critical care time provided by the CICU Attending, Dr. Durand.    By signing my name below, I, Kalyn Sousa, attest that this documentation has been prepared under the direction and in the presence of Kitty Braden NP.  Electronically signed: Rosalba Booth, 23 @ 20:25    I, Kitty Braden , personally performed the services described in this documentation. all medical record entries made by the eileenibe were at my direction and in my presence. I have reviewed the chart and agree that the record reflects my personal performance and is accurate and complete  Electronically signed: Kitty Braden NP.     DEVORAH VALENCIA  MRN-[id number]  Patient is a 58y old  Male who presents with a chief complaint of NSTEMI, ADHF (21 Dec 2023 08:05)    HPI:  59 yo M with HFrEF (LVIDd 6.4 cm, LVEF 32%), CAD s/p PCI (), HTN, DMT2 (A1c 8.3%) and CVA s/p TPA (), recently treated for PNA who initially presented to [facility] via EMS after syncope reportedly requiring defibrilation. Treated for ACS but left AMA to come to [facility]. On arrival ECG with ST depression in lateral leads. Intubated  for respiratory failure. Found to have COVID. L/RHC evealing  of LAD and RCA, elevated filling pressures and CI of 1.5 prompting placement of IABP. Extubated 11/3. IABP was weaned to off , then the following day on , developed PMVT cardiac arrest with prompt CPR and defibrillation, started on IV Lidocaine and Amio. IABP ultimately replaced on  for worsening hypotension. TTE  revealing LV thrombus. Mild transaminitis. Now off amio.    24 HOUR EVENTS: IABP changed to L fem. right femoral sheath removed    REVIEW OF SYSTEMS:  CONSTITUTIONAL: No weakness, fevers or chills  EYES/ENT: No visual changes;  No vertigo or throat pain   NECK: No pain or stiffness  RESPIRATORY: No cough, wheezing, hemoptysis; No shortness of breath  CARDIOVASCULAR: No chest pain or palpitations  GASTROINTESTINAL: No abdominal or epigastric pain. No nausea, vomiting, or hematemesis; No diarrhea or constipation. No melena or hematochezia.  GENITOURINARY: No dysuria, frequency or hematuria  NEUROLOGICAL: No numbness or weakness  SKIN: No itching, rashes      ICU Vital Signs Last 24 Hrs  T(C): 37 (21 Dec 2023 20:00), Max: 37 (21 Dec 2023 20:00)  T(F): 98.6 (21 Dec 2023 20:00), Max: 98.6 (21 Dec 2023 20:00)  HR: 74 (21 Dec 2023 20:00) (71 - 86)  BP: --  BP(mean): --  ABP: --  ABP(mean): --  RR: 20 (21 Dec 2023 20:00) (15 - 26)  SpO2: 98% (21 Dec 2023 20:00) (95% - 100%)    O2 Parameters below as of 21 Dec 2023 20:00  Patient On (Oxygen Delivery Method): room air    I&O's Summary    20 Dec 2023 07:01  -  21 Dec 2023 07:00  --------------------------------------------------------  IN: 816 mL / OUT: 1080 mL / NET: -264 mL    21 Dec 2023 07:01  -  21 Dec 2023 20:25  --------------------------------------------------------  IN: 380 mL / OUT: 625 mL / NET: -245 mL    CAPILLARY BLOOD GLUCOSE      POCT Blood Glucose.: 100 mg/dL (21 Dec 2023 18:18)      PHYSICAL EXAM:  GENERAL: No acute distress, well-developed  HEAD:  Atraumatic, Normocephalic  EYES: EOMI, PERRLA, conjunctiva and sclera clear  NECK: Supple, no JVD  CHEST/LUNG: CTAB; No wheezes, rales, or rhonchi  HEART: Regular rate and rhythm. Normal S1/S2. No murmurs, rubs, or gallops  ABDOMEN: Soft, non-tender, non-distended; normal bowel sounds  EXTREMITIES:  2+ peripheral pulses b/l, No clubbing, cyanosis, or edema  NEUROLOGY: A&O x 3, no focal deficits  SKIN: upper extremity rash improving.   Lines: L groin IABP clean, dry and intact. R femoral sheath site c/d/i.    ============================I/O===========================   I&O's Detail    20 Dec 2023 07:01  -  21 Dec 2023 07:00  --------------------------------------------------------  IN:    Heparin: 336 mL    Oral Fluid: 480 mL  Total IN: 816 mL    OUT:    Voided (mL): 1080 mL  Total OUT: 1080 mL    Total NET: -264 mL      21 Dec 2023 07:01  -  21 Dec 2023 20:25  --------------------------------------------------------  IN:    Heparin: 140 mL    Oral Fluid: 240 mL  Total IN: 380 mL    OUT:    Voided (mL): 625 mL  Total OUT: 625 mL    Total NET: -245 mL        ============================ LABS =========================                        12.1   7.21  )-----------( 181      ( 21 Dec 2023 02:02 )             36.2     12-    134<L>  |  104  |  32<H>  ----------------------------<  143<H>  4.3   |  19<L>  |  1.24    Ca    10.0      21 Dec 2023 02:02  Phos  3.9     12-  Mg     1.9     -    TPro  6.9  /  Alb  3.9  /  TBili  0.2  /  DBili  x   /  AST  22  /  ALT  67<H>  /  AlkPhos  63  12-        LIVER FUNCTIONS - ( 21 Dec 2023 02:02 )  Alb: 3.9 g/dL / Pro: 6.9 g/dL / ALK PHOS: 63 U/L / ALT: 67 U/L / AST: 22 U/L / GGT: x           PT/INR - ( 21 Dec 2023 02:02 )   PT: 12.0 sec;   INR: 1.15 ratio         PTT - ( 21 Dec 2023 02:02 )  PTT:79.1 sec    Lactate, Blood: 0.9 mmol/L (23 @ 02:02)    Urinalysis Basic - ( 21 Dec 2023 02:02 )    Color: x / Appearance: x / SG: x / pH: x  Gluc: 143 mg/dL / Ketone: x  / Bili: x / Urobili: x   Blood: x / Protein: x / Nitrite: x   Leuk Esterase: x / RBC: x / WBC x   Sq Epi: x / Non Sq Epi: x / Bacteria: x      ======================Micro/Rad/Cardio=================  Telemtry: Reviewed   EKG: Reviewed  CXR: Reviewed  Culture: Reviewed   Echo:   Cath: Cardiac Cath Lab - Adult:   [facility]  Department of Cardiology  [address] [place] [address]  [phone]  Cath Lab Report -- Comprehensive Report  Patient: LD VALENCIA  Study date: 2017  Account number: [id number]  MR number: [id number]  : 1965  Gender: Male  Race: W  Case Physician(s):  Jairon Holguin M.D.  Referring Physician:  Luc Lynn M.D.  INDICATIONS: Unstable angina - CCS4.  HISTORY: The patient has a history of coronary artery disease. The patient  hashypertension and medication-treated dyslipidemia.  PROCEDURE:  --  Left heart catheterization with ventriculography.  --  Left coronary angiography.  --  Right coronary angiography.  TECHNIQUE: The risks and alternatives of the procedures and conscious  sedation were explained to the patient and informed consent was obtained.  Cardiac catheterization performed electively.  Local anesthetic given. Right radial artery access. A 6FR PRELUDE KIT was  inserted in the vessel. Left heart catheterization. Ventriculography was  performed. A 5FR FR4.0 EXPO catheter was utilized. Left coronary artery  angiography. The vessel was injected utilizing a 5FR FL3.5 EXPO catheter.  Right coronary artery angiography. The vessel was injected utilizing a 5FR  FR4.0 EXPO catheter. RADIATION EXPOSURE: 1.1 min.  CONTRAST GIVEN: Omnipaque 55 ml.  MEDICATIONS GIVEN: Midazolam, 1 mg, IV. Fentanyl, 25 mcg, IV. Verapamil  (Isoptin, Calan, Covera), 2.5 mg, IA. Heparin, 3000 units, IA.  VENTRICLES: Global left ventricular function was moderately depressed. EF  estimated was 40 %.  CORONARY VESSELS: The coronary circulation is right dominant.  LM:   --  LM: Normal.  LAD:   --  Proximal LAD: There was a 50 % stenosis.  CX:   --  Circumflex: Normal.  RCA:   --  Mid RCA: There was a 40 % stenosis.  --  Distal RCA: There was a 50 % stenosis.  COMPLICATIONS: There were no complications.  DIAGNOSTIC RECOMMENDATIONS: The patient should continue with the present  medications.  Prepared and signed by  Jairon Holguin M.D.  Signed 2017 12:20:13  HEMODYNAMIC TABLES  Pressures:  Baseline/ Room Air  Pressures:  - HR: 78  Pressures:  - Rhythm:  Pressures:  -- Aortic Pressure (S/D/M): --/--/99  Pressures:  -- Left Ventricle (s/edp): 157/39/--  Outputs:  Baseline/ Room Air  Outputs:  -- CALCULATIONS[age]  Outputs:  -- CALCULATIONS: Body Surface Area: 2.05  Outputs:  -- CALCULATIONS: Height in cm: 175.00  Outputs:  -- CALCULATIONS: Sex: Male  Outputs:  -- CALCULATIONS: Weight in k.40 (17 @ 21:55)    ======================================================  PAST MEDICAL & SURGICAL HISTORY:  HTN (hypertension)      CAD (coronary artery disease)  ; stent      Intracranial hemorrhage        Respiratory arrest        Myocardial infarction, unspecified MI type, unspecified artery      History of coronary artery stent placement  ====================ASSESSMENT ==============  58 male with HTN, CAD (s/p PCI ), HFrEF, CVA , and T2DM presenting with chest pressure and unknown tachycardia that was shocked x1, [facility]  found to have in-stent restenosis of pLAD and  of RCA, admitted to CICU for management of cardiogenic shock and ADHF requiring IABP  -, with hospital course c/b vfib arrest requiring reinsertion of IABP, currently listed for transplant status 2.    ====================== NEUROLOGY=====================  Anxiety  - No Seroquel/antipsychotics since vfib arrest and prolonged QTC  - Psych Eval, recommended SSRI, but pt. refused   - Psych recommending atarax PRN  - Continue to monitor mental status    PT/Conditioning  - Continue band exercises while on bedrest s/t IABP    ==================== RESPIRATORY======================  Acute Hypoxemic Respiratory Failure  - s/p x2 intubations for cardiogenic pulm edema and the in setting of cardiac arrest, resolved - extubated 11/10  - Currently maintaining >95% sats on room air  - Continue incentive spirometry and monitoring of sp02    Asthma  - c/w symbicort  - On trelegy at home  - Continue to monitor SpO2 with goal >94%    ====================CARDIOVASCULAR==================  Vfib arrest i/s/o ischemia  - Lido gtt off   - PO Amio load - total of 5g per EP complete   - Amio dc'd  for rising LFTs  - Keep K > 4, Mag > 2.2     Cardiogenic shock requiring IABP (- , -)  - Likely 2/2 NSTEMI and ADHF  -  LHC: pLAD 100 % in-stent restenosis & mRCA, 100 %. PCWP 30. IABP placed.  -  TTE: LV dilated. EF 32 %. Regional WMAs present, mod (grade 2) LV diastolic dysfunction  -  TTE: EF 22% and + LV thrombus  - appears euvolemic  - IABP swapped to LFA , continue 1:1 support  - hydralazine 50 TID and ISD 30 TID for AL reduction  - c/w coreg 25 BID for GDMT  - James d/c'd due to elevated K levels  - UNOS status 2 as of     NSTEMI iso stent re-occlusion of pLAD and 100%  of RCA  - EKG on admission w/ LBBB  - DAPT: c/w ASA, Brilinta d/c'd per transplant w/u  - c/w lipitor 80  - cMR deferred given necessity of IABP  - CT sx not recommending CABG, undergoing AT eval    LV thrombus  - c/w heparin gtt w/ therapeutic PTT    ===================== RENAL =========================  Non-oliguric ARIC, resolved  - Cr   - Baseline Cr:   - Renal US: no evidence of renal artery stenosis  - Trend BMP, lytes daily, replace as needed  - Continue Strict I/Os, avoid nephrotoxins    =============== GASTROINTESTINAL===================  Constipation/ileus, resolved  - regular diet  - bowel reg prn    ===================ENDO====================  Type 2 DM  - A1c 8.3, glucose controlled  - Continue lantus, premeal, low ISS  - continue FS monitoring    ===================HEMATOLOGIC/ONC ===================  - H/H & plts stable, no s/sx of bleeding  - Monitor H/H and plts q48h  - VTE PPX: heparin gtt    ==================INFECTIOUS DISEASE================  Positive blood culture, resolved  - Repeat BCx drawn  for leukocytosis to 12k - positive on , G+ rods in anaerobic bottle, suspect contaminant  - Repeat cultures x2 sent  per transplant ID recs - no growth to date  - Vancomycin by trough (-)  - Final microbial ID: Cutibacterium acnes, per ID this is likely to be a skin contaminant, vanc dc'd.   - Dental eval  to rule out infectious etiology that would have led to bacteremia, no significant findings on exam.     Enterococcus faecalis bacteremia, resolved  - BCx + for enterococcus faecalis x2, Staph epi x1 (likely contaminant)- pan sensitive   - Urine cx  + enterococcus faecalis  - BCx  no growth   - IABP site swapped to RFA   - s/p Vancomycin 1g q12h (-)  - CT A/P negative for infectious pathology    COVID, resolved  - Off airborne precautions     Pre-transplant ID w/u   - Trend ID recs for serologies   - Colonoscopy  - normal   - Chest CT  - improved LLL aeration  - s/p immunizations    ==================DERMATOLOGY================   Upper Extremity Rash  - Patient developed bilateral upper extremity rash after receiving Hepatitis A & B immunizations on   - Dermatology consulted  - not likely to be related to vanco  - Recommend clobetasol 0.05% BID to affected areas   - RVP repeated to rule out viral etiology, negative    Left neck mass  - US soft tissue:  lateral left neck 2.0 x 0.7 x 1.4 cm heterogeneous complex solid and cystic appearing lesion in subcutaneous soft tissue extending into subjacent strap muscle  - vasc cardiology consult: TOMMIEE venous duplex US ordered,  - Subcentimeter lymph nodes are present in the left neck and axilla, No evidence of left upper extremity DVT, Superficial thrombosis of the cephalic vein in the left upper arm.  - continue to monitor site    Lines: RFA IABP (-), RRA (-), LFA IABP (-    ======================= DISPOSITION  =====================  [X] Critical   [ ] Guarded    [ ] Stable    [X] Maintain in CICU  [ ] Downgrade to Telemtry  [ ] Discharge Home      Patient requires continuous monitoring with bedside rhythm monitoring, pulse ox monitoring, and intermittent blood gas analysis. Care plan discussed with ICU care team. Patient remained critical and at risk for life threatening decompensation.  Patient seen, examined and plan discussed with CCU team during rounds.     I have personally provided 30 minutes of critical care time excluding time spent on separate procedures, in addition to initial critical care time provided by the CICU Attending, Dr. Durand.    By signing my name below, I, Kalyn Sousa, attest that this documentation has been prepared under the direction and in the presence of Kitty Braden NP.  Electronically signed: Rosalba Booth, 23 @ 20:25    I, Kitty Braden , personally performed the services described in this documentation. all medical record entries made by the elieenibe were at my direction and in my presence. I have reviewed the chart and agree that the record reflects my personal performance and is accurate and complete  Electronically signed: Kitty Braden NP.

## 2023-12-21 NOTE — PROGRESS NOTE ADULT - ATTENDING COMMENTS
Discharge criteria met. Post procedure dressing dry and intact. Sensory and motor function intact as per pre-procedure. Patient alert and oriented x3  Instructions and follow up reviewed with pt at patient at discharge. Discharged home transported by wheelchair.   Discharged @ 0 57yo M with ischemic HFrEF, ADHF and cardiogenic shock awaiting heart transplant (status 2)  Neuro: no deficits, prn atarax for anxiety  Pulm: sating well on RA, prn albuterol and symbicort  CV: cardiogenic shock on IABP 1:1, cont afterload reduction with coreg, hydral, isordil  Renal: Cr under 1, no issues  GI: DASH diet  Heme: H/H stable, on heparin gtt for LV thrombus and IABP.   ID: no active issues  Endo: BG controlled  neck: US vascular ordered for the mass   Lines: IABP (11/20), RRA (12/14) possible IABP exchange today for intermittent air leaf alarms as d/w CHF team

## 2023-12-21 NOTE — PROGRESS NOTE ADULT - ASSESSMENT
====================ASSESSMENT ==============  58 male with HTN, CAD (s/p PCI 2008), HFrEF, CVA 2018, and T2DM presenting with chest pressure and unknown tachycardia that was shocked x1, [facility] 11/1 found to have in-stent restenosis of pLAD and  of RCA, admitted to CICU for management of cardiogenic shock and ADHF requiring IABP 11/1 -11/7, with hospital course c/b vfib arrest requiring reinsertion of IABP, currently listed for transplant status 2.    ====================== NEUROLOGY=====================  Anxiety  - No Seroquel/antipsychotics since vfib arrest and prolonged QTC  - Psych Eval, recommended SSRI, but pt. refused   - Psych recommending atarax PRN  - Continue to monitor mental status    PT/Conditioning  - Continue band exercises while on bedrest s/t IABP    ==================== RESPIRATORY======================  Acute Hypoxemic Respiratory Failure  - s/p x2 intubations for cardiogenic pulm edema and the in setting of cardiac arrest, resolved - extubated 11/10  - Currently maintaining >95% sats on room air  - Continue incentive spirometry and monitoring of sp02    Asthma  - c/w symbicort  - On trelegy at home  - Continue to monitor SpO2 with goal >94%    ====================CARDIOVASCULAR==================  Vfib arrest i/s/o ischemia  - Lido gtt off 11/13  - PO Amio load - total of 5g per EP complete 11/17  - Amio dc'd 12/5 for rising LFTs  - Keep K > 4, Mag > 2.2     Cardiogenic shock requiring IABP (11/1- 11/7, 11/9-)  - Likely 2/2 NSTEMI and ADHF  - 11/1 [facility]: pLAD 100 % in-stent restenosis & mRCA, 100 %. PCWP 30. IABP placed.  - 11/1 TTE: LV dilated. EF 32 %. Regional WMAs present, mod (grade 2) LV diastolic dysfunction  - 11/2 TTE: EF 22% and + LV thrombus  - IABP swapped 11/20 to RFA, continue 1:1 support - switched sensor to R radial a-line 12/13 due to poor sensing on groin line  - Off Milrinone gtt @ 7:30 am 11/11  - hydralazine 50 TID and ISD 30 TID for AL reduction  - [place] d/c'd due to elevated K levels  - c/w coreg 25 BID for GDMT  - UNOS status 2 as of 12/6    NSTEMI iso stent re-occlusion of pLAD and 100%  of RCA  - EKG on admission w/ LBBB  - DAPT: c/w ASA, Brilinta d/c'd per transplant w/u  - c/w lipitor 80  - cMR deferred given necessity of IABP  - CT sx not recommending CABG, undergoing AT eval    LV thrombus  - c/w heparin gtt w/ therapeutic PTT    ===================== RENAL =========================  Non-oliguric ARIC, resolved   - Baseline Cr: 1-1.22  - Renal US: no evidence of renal artery stenosis  - Trend BMP, lytes daily, replace as needed  - Continue Strict I/Os, avoid nephrotoxins    =============== GASTROINTESTINAL===================  Constipation/ileus, resolved  - regular diet  - bowel reg prn    ===================ENDO====================  Type 2 DM  - A1c 8.3, glucose controlled  - Continue lantus, premeal, low ISS  - continue FS monitoring    ===================HEMATOLOGIC/ONC ===================  - H/H & plts stable, no s/sx of bleeding  - Monitor H/H and plts q48h  - VTE PPX: heparin gtt    ==================INFECTIOUS DISEASE================  Positive blood culture, resolved  - Repeat BCx drawn 11/30 for leukocytosis to 12k - positive on 12/4, G+ rods in anaerobic bottle, suspect contaminant  - Repeat cultures x2 sent 12/4 per transplant ID recs - no growth to date  - Vancomycin by trough (12/4-12/6)  - Final microbial ID: Cutibacterium acnes, per ID this is likely to be a skin contaminant, vanc dc'd.   - Dental eval 12/7 to rule out infectious etiology that would have led to bacteremia, no significant findings on exam.     Enterococcus faecalis bacteremia, resolved  - BCx 11/17+ for enterococcus faecalis x2, Staph epi x1 (likely contaminant)- pan sensitive   - Urine cx 11/18 + enterococcus faecalis  - BCx 11/18 no growth   - IABP site swapped to RFA 11/20  - s/p Vancomycin 1g q12h (11/18-11/27)  - CT A/P negative for infectious pathology    COVID, resolved  - Off airborne precautions 11/11    Pre-transplant ID w/u   - Trend ID recs for serologies   - Colonoscopy 11/17 - normal   - Chest CT 11/17 - improved LLL aeration  - s/p immunizations    ==================DERMATOLOGY================   Upper Extremity Rash  - Patient developed bilateral upper extremity rash after receiving Hepatitis A & B immunizations on 11/24  - Dermatology consulted 12/5 - not likely to be related to vanco  - Recommend clobetasol 0.05% BID to affected areas   - RVP repeated to rule out viral etiology, negative    Left neck mass  - US soft tissue:  lateral left neck 2.0 x 0.7 x 1.4 cm heterogeneous complex solid and cystic appearing lesion in subcutaneous soft tissue extending into subjacent strap muscle  - vasc cardiology consult: LUE venous duplex US ordered, 12/20 - Subcentimeter lymph nodes are present in the left neck and axilla, No evidence of left upper extremity DVT, Superficial thrombosis of the cephalic vein in the left upper arm.  - continue to monitor site    Lines: IABP (11/20-), RRA (12/14-) ====================ASSESSMENT ==============  58 male with HTN, CAD (s/p PCI 2008), HFrEF, CVA 2018, and T2DM presenting with chest pressure and unknown tachycardia that was shocked x1, [facility] 11/1 found to have in-stent restenosis of pLAD and  of RCA, admitted to CICU for management of cardiogenic shock and ADHF requiring IABP 11/1 -11/7, with hospital course c/b vfib arrest requiring reinsertion of IABP, currently listed for transplant status 2.    ====================== NEUROLOGY=====================  Anxiety  - No Seroquel/antipsychotics since vfib arrest and prolonged QTC  - Psych Eval, recommended SSRI, but pt. refused   - Psych recommending atarax PRN  - Continue to monitor mental status    PT/Conditioning  - Continue band exercises while on bedrest s/t IABP    ==================== RESPIRATORY======================  Acute Hypoxemic Respiratory Failure  - s/p x2 intubations for cardiogenic pulm edema and the in setting of cardiac arrest, resolved - extubated 11/10  - Currently maintaining >95% sats on room air  - Continue incentive spirometry and monitoring of sp02    Asthma  - c/w symbicort  - On trelegy at home  - Continue to monitor SpO2 with goal >94%    ====================CARDIOVASCULAR==================  Vfib arrest i/s/o ischemia  - Lido gtt off 11/13  - PO Amio load - total of 5g per EP complete 11/17  - Amio dc'd 12/5 for rising LFTs  - Keep K > 4, Mag > 2.2     Cardiogenic shock requiring IABP (11/1- 11/7, 11/9-)  - Likely 2/2 NSTEMI and ADHF  - 11/1 [facility]: pLAD 100 % in-stent restenosis & mRCA, 100 %. PCWP 30. IABP placed.  - 11/1 TTE: LV dilated. EF 32 %. Regional WMAs present, mod (grade 2) LV diastolic dysfunction  - 11/2 TTE: EF 22% and + LV thrombus  - IABP swapped 11/20 to RFA, continue 1:1 support - switched sensor to R radial a-line 12/13 due to poor sensing on groin line  - Off Milrinone gtt @ 7:30 am 11/11  - hydralazine 50 TID and ISD 30 TID for AL reduction  - [place] d/c'd due to elevated K levels  - c/w coreg 25 BID for GDMT  - UNOS status 2 as of 12/6    NSTEMI iso stent re-occlusion of pLAD and 100%  of RCA  - EKG on admission w/ LBBB  - DAPT: c/w ASA, Brilinta d/c'd per transplant w/u  - c/w lipitor 80  - cMR deferred given necessity of IABP  - CT sx not recommending CABG, undergoing AT eval    LV thrombus  - c/w heparin gtt w/ therapeutic PTT    ===================== RENAL =========================  Non-oliguric ARIC, resolved   - Baseline Cr: 1-1.22  - Renal US: no evidence of renal artery stenosis  - Trend BMP, lytes daily, replace as needed  - Continue Strict I/Os, avoid nephrotoxins    =============== GASTROINTESTINAL===================  Constipation/ileus, resolved  - regular diet  - bowel reg prn    ===================ENDO====================  Type 2 DM  - A1c 8.3, glucose controlled  - Continue lantus, premeal, low ISS  - continue FS monitoring    ===================HEMATOLOGIC/ONC ===================  - H/H & plts stable, no s/sx of bleeding  - Monitor H/H and plts q48h  - VTE PPX: heparin gtt    ==================INFECTIOUS DISEASE================  Positive blood culture, resolved  - Repeat BCx drawn 11/30 for leukocytosis to 12k - positive on 12/4, G+ rods in anaerobic bottle, suspect contaminant  - Repeat cultures x2 sent 12/4 per transplant ID recs - no growth to date  - Vancomycin by trough (12/4-12/6)  - Final microbial ID: Cutibacterium acnes, per ID this is likely to be a skin contaminant, vanc dc'd.   - Dental eval 12/7 to rule out infectious etiology that would have led to bacteremia, no significant findings on exam.     Enterococcus faecalis bacteremia, resolved  - BCx 11/17+ for enterococcus faecalis x2, Staph epi x1 (likely contaminant)- pan sensitive   - Urine cx 11/18 + enterococcus faecalis  - BCx 11/18 no growth   - IABP site swapped to RFA 11/20  - s/p Vancomycin 1g q12h (11/18-11/27)  - CT A/P negative for infectious pathology    COVID, resolved  - Off airborne precautions 11/11    Pre-transplant ID w/u   - Trend ID recs for serologies   - Colonoscopy 11/17 - normal   - Chest CT 11/17 - improved LLL aeration  - s/p immunizations    ==================DERMATOLOGY================   Upper Extremity Rash  - Patient developed bilateral upper extremity rash after receiving Hepatitis A & B immunizations on 11/24  - Dermatology consulted 12/5 - not likely to be related to vanco  - Recommend clobetasol 0.05% BID to affected areas   - RVP repeated to rule out viral etiology, negative    Left neck mass  - US soft tissue:  lateral left neck 2.0 x 0.7 x 1.4 cm heterogeneous complex solid and cystic appearing lesion in subcutaneous soft tissue extending into subjacent strap muscle  - vasc cardiology consult: LUE venous duplex US ordered, 12/20 - Subcentimeter lymph nodes are present in the left neck and axilla, No evidence of left upper extremity DVT, Superficial thrombosis of the cephalic vein in the left upper arm.  - continue to monitor site    Lines: IABP (11/20-), RRA (12/14-) ====================ASSESSMENT ==============  58 male with HTN, CAD (s/p PCI 2008), HFrEF, CVA 2018, and T2DM presenting with chest pressure and unknown tachycardia that was shocked x1, [facility] 11/1 found to have in-stent restenosis of pLAD and  of RCA, admitted to CICU for management of cardiogenic shock and ADHF requiring IABP 11/1 -11/7, with hospital course c/b vfib arrest requiring reinsertion of IABP, currently listed for transplant status 2.    ====================== NEUROLOGY=====================  Anxiety  - No Seroquel/antipsychotics since vfib arrest and prolonged QTC  - Psych Eval, recommended SSRI, but pt. refused   - Psych recommending atarax PRN  - Continue to monitor mental status    PT/Conditioning  - Continue band exercises while on bedrest s/t IABP    ==================== RESPIRATORY======================  Acute Hypoxemic Respiratory Failure  - s/p x2 intubations for cardiogenic pulm edema and the in setting of cardiac arrest, resolved - extubated 11/10  - Currently maintaining >95% sats on room air  - Continue incentive spirometry and monitoring of sp02    Asthma  - c/w symbicort  - On trelegy at home  - Continue to monitor SpO2 with goal >94%    ====================CARDIOVASCULAR==================  Vfib arrest i/s/o ischemia  - Lido gtt off 11/13  - PO Amio load - total of 5g per EP complete 11/17  - Amio dc'd 12/5 for rising LFTs  - Keep K > 4, Mag > 2.2     Cardiogenic shock requiring IABP (11/1- 11/7, 11/9-)  - Likely 2/2 NSTEMI and ADHF  - 11/1 [facility]: pLAD 100 % in-stent restenosis & mRCA, 100 %. PCWP 30. IABP placed.  - 11/1 TTE: LV dilated. EF 32 %. Regional WMAs present, mod (grade 2) LV diastolic dysfunction  - 11/2 TTE: EF 22% and + LV thrombus  - IABP swapped 11/20 to RFA, continue 1:1 support - switched sensor to R radial a-line 12/13 due to poor sensing on groin line  - tentative IABP swap 12/21  - hydralazine 50 TID and ISD 30 TID for AL reduction  - c/w coreg 25 BID for GDMT  - [place] d/c'd due to elevated K levels  - UNOS status 2 as of 12/6    NSTEMI iso stent re-occlusion of pLAD and 100%  of RCA  - EKG on admission w/ LBBB  - DAPT: c/w ASA, Brilinta d/c'd per transplant w/u  - c/w lipitor 80  - cMR deferred given necessity of IABP  - CT sx not recommending CABG, undergoing AT eval    LV thrombus  - c/w heparin gtt w/ therapeutic PTT    ===================== RENAL =========================  Non-oliguric ARIC, resolved  - Cr 1.24 12/21  - Baseline Cr: 1-1.22  - Renal US: no evidence of renal artery stenosis  - Trend BMP, lytes daily, replace as needed  - Continue Strict I/Os, avoid nephrotoxins    =============== GASTROINTESTINAL===================  Constipation/ileus, resolved  - regular diet  - bowel reg prn    ===================ENDO====================  Type 2 DM  - A1c 8.3, glucose controlled  - Continue lantus, premeal, low ISS  - continue FS monitoring    ===================HEMATOLOGIC/ONC ===================  - H/H & plts stable, no s/sx of bleeding  - Monitor H/H and plts q48h  - VTE PPX: heparin gtt    ==================INFECTIOUS DISEASE================  Positive blood culture, resolved  - Repeat BCx drawn 11/30 for leukocytosis to 12k - positive on 12/4, G+ rods in anaerobic bottle, suspect contaminant  - Repeat cultures x2 sent 12/4 per transplant ID recs - no growth to date  - Vancomycin by trough (12/4-12/6)  - Final microbial ID: Cutibacterium acnes, per ID this is likely to be a skin contaminant, vanc dc'd.   - Dental eval 12/7 to rule out infectious etiology that would have led to bacteremia, no significant findings on exam.     Enterococcus faecalis bacteremia, resolved  - BCx 11/17+ for enterococcus faecalis x2, Staph epi x1 (likely contaminant)- pan sensitive   - Urine cx 11/18 + enterococcus faecalis  - BCx 11/18 no growth   - IABP site swapped to RFA 11/20  - s/p Vancomycin 1g q12h (11/18-11/27)  - CT A/P negative for infectious pathology    COVID, resolved  - Off airborne precautions 11/11    Pre-transplant ID w/u   - Trend ID recs for serologies   - Colonoscopy 11/17 - normal   - Chest CT 11/17 - improved LLL aeration  - s/p immunizations    ==================DERMATOLOGY================   Upper Extremity Rash  - Patient developed bilateral upper extremity rash after receiving Hepatitis A & B immunizations on 11/24  - Dermatology consulted 12/5 - not likely to be related to vanco  - Recommend clobetasol 0.05% BID to affected areas   - RVP repeated to rule out viral etiology, negative    Left neck mass  - US soft tissue:  lateral left neck 2.0 x 0.7 x 1.4 cm heterogeneous complex solid and cystic appearing lesion in subcutaneous soft tissue extending into subjacent strap muscle  - vasc cardiology consult: LUE venous duplex US ordered, 12/20 - Subcentimeter lymph nodes are present in the left neck and axilla, No evidence of left upper extremity DVT, Superficial thrombosis of the cephalic vein in the left upper arm.  - continue to monitor site    Lines: IABP (11/20-), RRA (12/14-) ====================ASSESSMENT ==============  58 male with HTN, CAD (s/p PCI 2008), HFrEF, CVA 2018, and T2DM presenting with chest pressure and unknown tachycardia that was shocked x1, [facility] 11/1 found to have in-stent restenosis of pLAD and  of RCA, admitted to CICU for management of cardiogenic shock and ADHF requiring IABP 11/1 -11/7, with hospital course c/b vfib arrest requiring reinsertion of IABP, currently listed for transplant status 2.    ====================== NEUROLOGY=====================  Anxiety  - No Seroquel/antipsychotics since vfib arrest and prolonged QTC  - Psych Eval, recommended SSRI, but pt. refused   - Psych recommending atarax PRN  - Continue to monitor mental status    PT/Conditioning  - Continue band exercises while on bedrest s/t IABP    ==================== RESPIRATORY======================  Acute Hypoxemic Respiratory Failure  - s/p x2 intubations for cardiogenic pulm edema and the in setting of cardiac arrest, resolved - extubated 11/10  - Currently maintaining >95% sats on room air  - Continue incentive spirometry and monitoring of sp02    Asthma  - c/w symbicort  - On trelegy at home  - Continue to monitor SpO2 with goal >94%    ====================CARDIOVASCULAR==================  Vfib arrest i/s/o ischemia  - Lido gtt off 11/13  - PO Amio load - total of 5g per EP complete 11/17  - Amio dc'd 12/5 for rising LFTs  - Keep K > 4, Mag > 2.2     Cardiogenic shock requiring IABP (11/1- 11/7, 11/9-)  - Likely 2/2 NSTEMI and ADHF  - 11/1 [facility]: pLAD 100 % in-stent restenosis & mRCA, 100 %. PCWP 30. IABP placed.  - 11/1 TTE: LV dilated. EF 32 %. Regional WMAs present, mod (grade 2) LV diastolic dysfunction  - 11/2 TTE: EF 22% and + LV thrombus  - IABP swapped 11/20 to RFA, continue 1:1 support - switched sensor to R radial a-line 12/13 due to poor sensing on groin line  - tentative IABP swap 12/21  - hydralazine 50 TID and ISD 30 TID for AL reduction  - c/w coreg 25 BID for GDMT  - [place] d/c'd due to elevated K levels  - UNOS status 2 as of 12/6    NSTEMI iso stent re-occlusion of pLAD and 100%  of RCA  - EKG on admission w/ LBBB  - DAPT: c/w ASA, Brilinta d/c'd per transplant w/u  - c/w lipitor 80  - cMR deferred given necessity of IABP  - CT sx not recommending CABG, undergoing AT eval    LV thrombus  - c/w heparin gtt w/ therapeutic PTT    ===================== RENAL =========================  Non-oliguric ARIC, resolved  - Cr 1.24 12/21  - Baseline Cr: 1-1.22  - Renal US: no evidence of renal artery stenosis  - Trend BMP, lytes daily, replace as needed  - Continue Strict I/Os, avoid nephrotoxins    =============== GASTROINTESTINAL===================  Constipation/ileus, resolved  - regular diet  - bowel reg prn    ===================ENDO====================  Type 2 DM  - A1c 8.3, glucose controlled  - Continue lantus, premeal, low ISS  - continue FS monitoring    ===================HEMATOLOGIC/ONC ===================  - H/H & plts stable, no s/sx of bleeding  - Monitor H/H and plts q48h  - VTE PPX: heparin gtt    ==================INFECTIOUS DISEASE================  Positive blood culture, resolved  - Repeat BCx drawn 11/30 for leukocytosis to 12k - positive on 12/4, G+ rods in anaerobic bottle, suspect contaminant  - Repeat cultures x2 sent 12/4 per transplant ID recs - no growth to date  - Vancomycin by trough (12/4-12/6)  - Final microbial ID: Cutibacterium acnes, per ID this is likely to be a skin contaminant, vanc dc'd.   - Dental eval 12/7 to rule out infectious etiology that would have led to bacteremia, no significant findings on exam.     Enterococcus faecalis bacteremia, resolved  - BCx 11/17+ for enterococcus faecalis x2, Staph epi x1 (likely contaminant)- pan sensitive   - Urine cx 11/18 + enterococcus faecalis  - BCx 11/18 no growth   - IABP site swapped to RFA 11/20  - s/p Vancomycin 1g q12h (11/18-11/27)  - CT A/P negative for infectious pathology    COVID, resolved  - Off airborne precautions 11/11    Pre-transplant ID w/u   - Trend ID recs for serologies   - Colonoscopy 11/17 - normal   - Chest CT 11/17 - improved LLL aeration  - s/p immunizations    ==================DERMATOLOGY================   Upper Extremity Rash  - Patient developed bilateral upper extremity rash after receiving Hepatitis A & B immunizations on 11/24  - Dermatology consulted 12/5 - not likely to be related to vanco  - Recommend clobetasol 0.05% BID to affected areas   - RVP repeated to rule out viral etiology, negative    Left neck mass  - US soft tissue:  lateral left neck 2.0 x 0.7 x 1.4 cm heterogeneous complex solid and cystic appearing lesion in subcutaneous soft tissue extending into subjacent strap muscle  - vasc cardiology consult: LUE venous duplex US ordered, 12/20 - Subcentimeter lymph nodes are present in the left neck and axilla, No evidence of left upper extremity DVT, Superficial thrombosis of the cephalic vein in the left upper arm.  - continue to monitor site    Lines: IABP (11/20-), RRA (12/14-)

## 2023-12-21 NOTE — PROGRESS NOTE ADULT - SUBJECTIVE AND OBJECTIVE BOX
Subjective:    Medications:  aspirin  chewable 81 milliGRAM(s) Oral daily  atorvastatin 80 milliGRAM(s) Oral at bedtime  bisacodyl 5 milliGRAM(s) Oral every 12 hours  budesonide  80 MICROgram(s)/formoterol 4.5 MICROgram(s) Inhaler 2 Puff(s) Inhalation two times a day  carvedilol 25 milliGRAM(s) Oral every 12 hours  chlorhexidine 2% Cloths 1 Application(s) Topical daily  heparin  Infusion 1300 Unit(s)/Hr IV Continuous <Continuous>  hepatitis B Vaccine - Adult (HEPLISAV-B) 20 MICROGram(s) IntraMuscular once  hydrALAZINE 50 milliGRAM(s) Oral every 8 hours  hydrOXYzine hydrochloride 25 milliGRAM(s) Oral two times a day PRN  insulin glargine Injectable (LANTUS) 12 Unit(s) SubCutaneous at bedtime  insulin lispro (ADMELOG) corrective regimen sliding scale   SubCutaneous three times a day before meals  insulin lispro (ADMELOG) corrective regimen sliding scale   SubCutaneous at bedtime  insulin lispro Injectable (ADMELOG) 9 Unit(s) SubCutaneous three times a day before meals  isosorbide   dinitrate Tablet (ISORDIL) 30 milliGRAM(s) Oral three times a day  polyethylene glycol 3350 17 Gram(s) Oral two times a day  senna 2 Tablet(s) Oral at bedtime      Physical Exam:    Vitals:  Vital Signs Last 24 Hours  T(C): 36.7 (23 @ 07:00), Max: 36.9 (23 @ 15:00)  HR: 78 (23 @ 10:00) (71 - 91)  BP: --  RR: 18 (23 @ 10:00) (14 - 32)  SpO2: 99% (23 @ 10:00) (94% - 100%)    Weight in k ( @ 06:00)    I&O's Summary    20 Dec 2023 07:01  -  21 Dec 2023 07:00  --------------------------------------------------------  IN: 816 mL / OUT: 1080 mL / NET: -264 mL    21 Dec 2023 07:01  -  21 Dec 2023 10:54  --------------------------------------------------------  IN: 268 mL / OUT: 0 mL / NET: 268 mL        Tele:    General: No distress. Comfortable.  HEENT: EOM intact.  Neck: Neck supple. JVP not elevated. No masses  Chest: Clear to auscultation bilaterally  CV: Normal S1 and S2. No murmurs, rub, or gallops. Radial pulses normal.  Abdomen: Soft, non-distended, non-tender  Skin: No rashes or skin breakdown  Neurology: Alert and oriented times three. Sensation intact  Psych: Affect normal    Labs:                        12.1   7.21  )-----------( 181      ( 21 Dec 2023 02:02 )             36.2         134<L>  |  104  |  32<H>  ----------------------------<  143<H>  4.3   |  19<L>  |  1.24    Ca    10.0      21 Dec 2023 02:02  Phos  3.9       Mg     1.9         TPro  6.9  /  Alb  3.9  /  TBili  0.2  /  DBili  x   /  AST  22  /  ALT  67<H>  /  AlkPhos  63  12    PT/INR - ( 21 Dec 2023 02:02 )   PT: 12.0 sec;   INR: 1.15 ratio         PTT - ( 21 Dec 2023 02:02 )  PTT:79.1 sec            Lactate, Blood: 0.9 mmol/L ( @ 02:02)     Subjective:  - did not sleep well overnight due to frequent alarms from LVAD. No orthopnea/PND.     Medications:  aspirin  chewable 81 milliGRAM(s) Oral daily  atorvastatin 80 milliGRAM(s) Oral at bedtime  bisacodyl 5 milliGRAM(s) Oral every 12 hours  budesonide  80 MICROgram(s)/formoterol 4.5 MICROgram(s) Inhaler 2 Puff(s) Inhalation two times a day  carvedilol 25 milliGRAM(s) Oral every 12 hours  chlorhexidine 2% Cloths 1 Application(s) Topical daily  heparin  Infusion 1300 Unit(s)/Hr IV Continuous <Continuous>  hepatitis B Vaccine - Adult (HEPLISAV-B) 20 MICROGram(s) IntraMuscular once  hydrALAZINE 50 milliGRAM(s) Oral every 8 hours  hydrOXYzine hydrochloride 25 milliGRAM(s) Oral two times a day PRN  insulin glargine Injectable (LANTUS) 12 Unit(s) SubCutaneous at bedtime  insulin lispro (ADMELOG) corrective regimen sliding scale   SubCutaneous three times a day before meals  insulin lispro (ADMELOG) corrective regimen sliding scale   SubCutaneous at bedtime  insulin lispro Injectable (ADMELOG) 9 Unit(s) SubCutaneous three times a day before meals  isosorbide   dinitrate Tablet (ISORDIL) 30 milliGRAM(s) Oral three times a day  polyethylene glycol 3350 17 Gram(s) Oral two times a day  senna 2 Tablet(s) Oral at bedtime      Physical Exam:    Vitals:  Vital Signs Last 24 Hours  T(C): 36.7 (23 @ 07:00), Max: 36.9 (23 @ 15:00)  HR: 78 (23 @ 10:00) (71 - 91)  BP: --  RR: 18 (23 @ 10:00) (14 - 32)  SpO2: 99% (23 @ 10:00) (94% - 100%)    Weight in k ( @ 06:00)    I&O's Summary    20 Dec 2023 07:01  -  21 Dec 2023 07:00  --------------------------------------------------------  IN: 816 mL / OUT: 1080 mL / NET: -264 mL    21 Dec 2023 07:01  -  21 Dec 2023 10:54  --------------------------------------------------------  IN: 268 mL / OUT: 0 mL / NET: 268 mL    Tele: SR 70s    General: No distress. Comfortable.  HEENT: EOM intact.  Neck: Neck supple. JVP not elevated. No masses  Chest: Clear to auscultation bilaterally  CV: Normal S1 and S2.   Abdomen: Soft, non-distended, non-tender  Skin: R fem IABP in place  Extremities: Warm peripherally, no edema  Neurology: Alert and oriented times three. Sensation intact  Psych: Affect normal    Labs:                        12.1   7.21  )-----------( 181      ( 21 Dec 2023 02:02 )             36.2     12    134<L>  |  104  |  32<H>  ----------------------------<  143<H>  4.3   |  19<L>  |  1.24    Ca    10.0      21 Dec 2023 02:02  Phos  3.9       Mg     1.9         TPro  6.9  /  Alb  3.9  /  TBili  0.2  /  DBili  x   /  AST  22  /  ALT  67<H>  /  AlkPhos  63  12-    PT/INR - ( 21 Dec 2023 02:02 )   PT: 12.0 sec;   INR: 1.15 ratio         PTT - ( 21 Dec 2023 02:02 )  PTT:79.1 sec            Lactate, Blood: 0.9 mmol/L ( @ 02:02)

## 2023-12-21 NOTE — PROGRESS NOTE ADULT - PROBLEM SELECTOR PLAN 1
-Listen UNOS status 2.   - L IABP at 1:1, placed 11/9. Exchange to LFA given high grade bacteremia on 11/20. Will again exchange if possible donor today not accepted. On AC with Heparin infusion (also for LV thrombus)  - Continue Coreg 25 mg BID hold for MAP <65  - Increase HDZN to 75 mg TID given MAPs in 80s and continue ISDN 30 mg TID, hold for MAP <65  - Off romel given HyperK  - AT evaluation: Accepted for transplant, currently listed UNOS Status 2. ABO A. PRA 0% 12/12.    However, downgraded to status 7 on 12/4 given + blood culture. But now re-upgraded to status 2, awaiting suitable donor  - Please keep primary Dr. Banuelos [phone] in the loop per family request. -Listen UNOS status 2.   - L IABP at 1:1, placed 11/9. Exchange to LFA given high grade bacteremia on 11/20. Will again exchange if possible donor today not accepted. On AC with Heparin infusion (also for LV thrombus)  - Continue Coreg 25 mg BID hold for MAP <65  - Increase HDZN to 75 mg TID given MAPs in 80s and continue ISDN 30 mg TID, hold for MAP <65  - Off romel given HyperK  - AT evaluation: Accepted for transplant, currently listed UNOS Status 2. ABO A. PRA 0% 12/12.    However, downgraded to status 7 on 12/4 given + blood culture. But now re-upgraded to status 2, awaiting suitable donor  - Please keep primary Dr. Banuelos [phone] in the loop per family request.

## 2023-12-21 NOTE — PROGRESS NOTE ADULT - CRITICAL CARE ATTENDING COMMENT
IABP had air leak alarm overnight. no further episodes. of note the optic sensor is not functioning IABP in situ for aprox one month.   no other events.   meds; heparin (79), coreg 25 bid, HDNZ 50 tid, ISDN 30 tid, asa, statin insulin.   HR 70 SR, Aug mean 79-88, aug diast 100-110, afebrile  I/O: -200  12-21    134<L>  |  104  |  32<H>  ----------------------------<  143<H>  4.3   |  19<L>  |  1.24    Ca    10.0      21 Dec 2023 02:02  Phos  3.9     12-21  Mg     1.9     12-21    TPro  6.9  /  Alb  3.9  /  TBili  0.2  /  DBili  x   /  AST  22  /  ALT  67<H>  /  AlkPhos  63  12-21                        12.1   7.21  )-----------( 181      ( 21 Dec 2023 02:02 )             36.2   RUE superficial thrombosis of cephalic   remains critically ill. UNOS 2e, ABO A, PRA 0  Possible IABP exchange later today.   attempt increase HDZN 75 tid.   Brody Espana

## 2023-12-22 LAB
APTT BLD: 63.8 SEC — HIGH (ref 24.5–35.6)
APTT BLD: 63.8 SEC — HIGH (ref 24.5–35.6)
GLUCOSE BLDC GLUCOMTR-MCNC: 161 MG/DL — HIGH (ref 70–99)
GLUCOSE BLDC GLUCOMTR-MCNC: 161 MG/DL — HIGH (ref 70–99)
GLUCOSE BLDC GLUCOMTR-MCNC: 179 MG/DL — HIGH (ref 70–99)
GLUCOSE BLDC GLUCOMTR-MCNC: 179 MG/DL — HIGH (ref 70–99)
GLUCOSE BLDC GLUCOMTR-MCNC: 229 MG/DL — HIGH (ref 70–99)
GLUCOSE BLDC GLUCOMTR-MCNC: 229 MG/DL — HIGH (ref 70–99)
INR BLD: 1.12 RATIO — SIGNIFICANT CHANGE UP (ref 0.85–1.18)
INR BLD: 1.12 RATIO — SIGNIFICANT CHANGE UP (ref 0.85–1.18)
PROTHROM AB SERPL-ACNC: 11.7 SEC — SIGNIFICANT CHANGE UP (ref 9.5–13)
PROTHROM AB SERPL-ACNC: 11.7 SEC — SIGNIFICANT CHANGE UP (ref 9.5–13)

## 2023-12-22 PROCEDURE — 71045 X-RAY EXAM CHEST 1 VIEW: CPT | Mod: 26

## 2023-12-22 PROCEDURE — 99232 SBSQ HOSP IP/OBS MODERATE 35: CPT

## 2023-12-22 PROCEDURE — 99292 CRITICAL CARE ADDL 30 MIN: CPT | Mod: 25

## 2023-12-22 PROCEDURE — 99291 CRITICAL CARE FIRST HOUR: CPT

## 2023-12-22 PROCEDURE — 93010 ELECTROCARDIOGRAM REPORT: CPT

## 2023-12-22 PROCEDURE — 99292 CRITICAL CARE ADDL 30 MIN: CPT

## 2023-12-22 RX ADMIN — BUDESONIDE AND FORMOTEROL FUMARATE DIHYDRATE 2 PUFF(S): 160; 4.5 AEROSOL RESPIRATORY (INHALATION) at 08:47

## 2023-12-22 RX ADMIN — ISOSORBIDE DINITRATE 30 MILLIGRAM(S): 5 TABLET ORAL at 06:19

## 2023-12-22 RX ADMIN — HEPARIN SODIUM 14 UNIT(S)/HR: 5000 INJECTION INTRAVENOUS; SUBCUTANEOUS at 11:31

## 2023-12-22 RX ADMIN — INSULIN GLARGINE 12 UNIT(S): 100 INJECTION, SOLUTION SUBCUTANEOUS at 21:40

## 2023-12-22 RX ADMIN — Medication 81 MILLIGRAM(S): at 11:31

## 2023-12-22 RX ADMIN — Medication 1: at 08:24

## 2023-12-22 RX ADMIN — Medication 9 UNIT(S): at 12:13

## 2023-12-22 RX ADMIN — HEPARIN SODIUM 14 UNIT(S)/HR: 5000 INJECTION INTRAVENOUS; SUBCUTANEOUS at 08:25

## 2023-12-22 RX ADMIN — BUDESONIDE AND FORMOTEROL FUMARATE DIHYDRATE 2 PUFF(S): 160; 4.5 AEROSOL RESPIRATORY (INHALATION) at 20:05

## 2023-12-22 RX ADMIN — POLYETHYLENE GLYCOL 3350 17 GRAM(S): 17 POWDER, FOR SOLUTION ORAL at 18:03

## 2023-12-22 RX ADMIN — Medication 50 MILLIGRAM(S): at 21:39

## 2023-12-22 RX ADMIN — CARVEDILOL PHOSPHATE 25 MILLIGRAM(S): 80 CAPSULE, EXTENDED RELEASE ORAL at 18:02

## 2023-12-22 RX ADMIN — ATORVASTATIN CALCIUM 80 MILLIGRAM(S): 80 TABLET, FILM COATED ORAL at 21:40

## 2023-12-22 RX ADMIN — CHLORHEXIDINE GLUCONATE 1 APPLICATION(S): 213 SOLUTION TOPICAL at 21:43

## 2023-12-22 RX ADMIN — Medication 50 MILLIGRAM(S): at 06:17

## 2023-12-22 RX ADMIN — CARVEDILOL PHOSPHATE 25 MILLIGRAM(S): 80 CAPSULE, EXTENDED RELEASE ORAL at 06:17

## 2023-12-22 RX ADMIN — ISOSORBIDE DINITRATE 30 MILLIGRAM(S): 5 TABLET ORAL at 18:01

## 2023-12-22 RX ADMIN — Medication 5 MILLIGRAM(S): at 18:03

## 2023-12-22 RX ADMIN — ISOSORBIDE DINITRATE 30 MILLIGRAM(S): 5 TABLET ORAL at 11:31

## 2023-12-22 RX ADMIN — HEPARIN SODIUM 14 UNIT(S)/HR: 5000 INJECTION INTRAVENOUS; SUBCUTANEOUS at 04:18

## 2023-12-22 RX ADMIN — Medication 5 MILLIGRAM(S): at 06:18

## 2023-12-22 RX ADMIN — Medication 2: at 12:13

## 2023-12-22 RX ADMIN — Medication 50 MILLIGRAM(S): at 17:14

## 2023-12-22 RX ADMIN — Medication 9 UNIT(S): at 08:25

## 2023-12-22 NOTE — PROGRESS NOTE ADULT - SUBJECTIVE AND OBJECTIVE BOX
ADVANCED HEART FAILURE & TRANSPLANT  - PROGRESS NOTE  *To reach the NS2 Team from 8am to 5pm, please call [phone]   ___________________________________________________________________________  Subjective:    Medications:  aspirin  chewable 81 milliGRAM(s) Oral daily  atorvastatin 80 milliGRAM(s) Oral at bedtime  bisacodyl 5 milliGRAM(s) Oral every 12 hours  budesonide  80 MICROgram(s)/formoterol 4.5 MICROgram(s) Inhaler 2 Puff(s) Inhalation two times a day  carvedilol 25 milliGRAM(s) Oral every 12 hours  chlorhexidine 2% Cloths 1 Application(s) Topical daily  heparin  Infusion 1300 Unit(s)/Hr IV Continuous <Continuous>  hepatitis B Vaccine - Adult (HEPLISAV-B) 20 MICROGram(s) IntraMuscular once  hydrALAZINE 50 milliGRAM(s) Oral every 8 hours  hydrOXYzine hydrochloride 25 milliGRAM(s) Oral two times a day PRN  insulin glargine Injectable (LANTUS) 12 Unit(s) SubCutaneous at bedtime  insulin lispro (ADMELOG) corrective regimen sliding scale   SubCutaneous three times a day before meals  insulin lispro (ADMELOG) corrective regimen sliding scale   SubCutaneous at bedtime  insulin lispro Injectable (ADMELOG) 9 Unit(s) SubCutaneous three times a day before meals  isosorbide   dinitrate Tablet (ISORDIL) 30 milliGRAM(s) Oral three times a day  polyethylene glycol 3350 17 Gram(s) Oral two times a day  senna 2 Tablet(s) Oral at bedtime      Physical Exam:    Vitals:  Vital Signs Last 24 Hours  T(C): 36.7 (23 @ 07:00), Max: 37 (23 @ 20:00)  HR: 76 (23 @ 08:00) (74 - 87)  BP: --  RR: 19 (23 @ 08:00) (16 - 26)  SpO2: 97% (12-22-23 @ 08:00) (95% - 100%)    Weight in k.2 ( @ 06:00)    I&O's Summary    21 Dec 2023 07:01  -  22 Dec 2023 07:00  --------------------------------------------------------  IN: 796 mL / OUT: 1275 mL / NET: -479 mL    22 Dec 2023 07:01  -  22 Dec 2023 08:36  --------------------------------------------------------  IN: 14 mL / OUT: 0 mL / NET: 14 mL    Tele:    General: No distress. Comfortable.  HEENT: EOM intact.  Neck: Neck supple. JVP not elevated. No masses  Chest: Clear to auscultation bilaterally  CV: Normal S1 and S2. No murmurs, rub, or gallops. Radial pulses normal.  Abdomen: Soft, non-distended, non-tender  Skin: No rashes or skin breakdown  Neurology: Alert and oriented times three. Sensation intact  Psych: Affect normal    Labs:                        12.1   7.21  )-----------( 181      ( 21 Dec 2023 02:02 )             36.2     12-    134<L>  |  104  |  32<H>  ----------------------------<  143<H>  4.3   |  19<L>  |  1.24    Ca    10.0      21 Dec 2023 02:02  Phos  3.9     12-21  Mg     1.9     12-    TPro  6.9  /  Alb  3.9  /  TBili  0.2  /  DBili  x   /  AST  22  /  ALT  67<H>  /  AlkPhos  63  12-21    PT/INR - ( 21 Dec 2023 02:02 )   PT: 12.0 sec;   INR: 1.15 ratio      PTT - ( 21 Dec 2023 02:02 )  PTT:79.1 sec    Lactate, Blood: 0.9 mmol/L (12-21 @ 02:02)     ADVANCED HEART FAILURE & TRANSPLANT  - PROGRESS NOTE  *To reach the NS2 Team from 8am to 5pm, please call [phone]   ___________________________________________________________________________  Subjective:    Medications:  aspirin  chewable 81 milliGRAM(s) Oral daily  atorvastatin 80 milliGRAM(s) Oral at bedtime  bisacodyl 5 milliGRAM(s) Oral every 12 hours  budesonide  80 MICROgram(s)/formoterol 4.5 MICROgram(s) Inhaler 2 Puff(s) Inhalation two times a day  carvedilol 25 milliGRAM(s) Oral every 12 hours  chlorhexidine 2% Cloths 1 Application(s) Topical daily  heparin  Infusion 1300 Unit(s)/Hr IV Continuous <Continuous>  hepatitis B Vaccine - Adult (HEPLISAV-B) 20 MICROGram(s) IntraMuscular once  hydrALAZINE 50 milliGRAM(s) Oral every 8 hours  hydrOXYzine hydrochloride 25 milliGRAM(s) Oral two times a day PRN  insulin glargine Injectable (LANTUS) 12 Unit(s) SubCutaneous at bedtime  insulin lispro (ADMELOG) corrective regimen sliding scale   SubCutaneous three times a day before meals  insulin lispro (ADMELOG) corrective regimen sliding scale   SubCutaneous at bedtime  insulin lispro Injectable (ADMELOG) 9 Unit(s) SubCutaneous three times a day before meals  isosorbide   dinitrate Tablet (ISORDIL) 30 milliGRAM(s) Oral three times a day  polyethylene glycol 3350 17 Gram(s) Oral two times a day  senna 2 Tablet(s) Oral at bedtime      Physical Exam:    Vitals:  Vital Signs Last 24 Hours  T(C): 36.7 (23 @ 07:00), Max: 37 (23 @ 20:00)  HR: 76 (23 @ 08:00) (74 - 87)  BP: --  RR: 19 (23 @ 08:00) (16 - 26)  SpO2: 97% (12-22-23 @ 08:00) (95% - 100%)    Weight in k.2 ( @ 06:00)    I&O's Summary    21 Dec 2023 07:01  -  22 Dec 2023 07:00  --------------------------------------------------------  IN: 796 mL / OUT: 1275 mL / NET: -479 mL    22 Dec 2023 07:01  -  22 Dec 2023 08:36  --------------------------------------------------------  IN: 14 mL / OUT: 0 mL / NET: 14 mL    Tele:    General: No distress. Comfortable.  HEENT: EOM intact.  Neck: Neck supple. JVP not elevated. No masses  Chest: Clear to auscultation bilaterally  CV: Normal S1 and S2. No murmurs, rub, or gallops. Radial pulses normal.  Abdomen: Soft, non-distended, non-tender  Skin: No rashes or skin breakdown  Neurology: Alert and oriented times three. Sensation intact  Psych: Affect normal    Labs:                        12.1   7.21  )-----------( 181      ( 21 Dec 2023 02:02 )             36.2     12-    134<L>  |  104  |  32<H>  ----------------------------<  143<H>  4.3   |  19<L>  |  1.24    Ca    10.0      21 Dec 2023 02:02  Phos  3.9     12-21  Mg     1.9     12-    TPro  6.9  /  Alb  3.9  /  TBili  0.2  /  DBili  x   /  AST  22  /  ALT  67<H>  /  AlkPhos  63  12-21    PT/INR - ( 21 Dec 2023 02:02 )   PT: 12.0 sec;   INR: 1.15 ratio      PTT - ( 21 Dec 2023 02:02 )  PTT:79.1 sec    Lactate, Blood: 0.9 mmol/L (12-21 @ 02:02)     ADVANCED HEART FAILURE & TRANSPLANT  - PROGRESS NOTE  *To reach the NS2 Team from 8am to 5pm, please call [phone]   ___________________________________________________________________________  Subjective:  - IABP exchanged to L fem. notes mild tenderness at bilateral groin site  - denies SOB, orthopnea, CP, abdominal discomfort Last BM 2 days ago.     Medications:  aspirin  chewable 81 milliGRAM(s) Oral daily  atorvastatin 80 milliGRAM(s) Oral at bedtime  bisacodyl 5 milliGRAM(s) Oral every 12 hours  budesonide  80 MICROgram(s)/formoterol 4.5 MICROgram(s) Inhaler 2 Puff(s) Inhalation two times a day  carvedilol 25 milliGRAM(s) Oral every 12 hours  chlorhexidine 2% Cloths 1 Application(s) Topical daily  heparin  Infusion 1300 Unit(s)/Hr IV Continuous <Continuous>  hepatitis B Vaccine - Adult (HEPLISAV-B) 20 MICROGram(s) IntraMuscular once  hydrALAZINE 50 milliGRAM(s) Oral every 8 hours  hydrOXYzine hydrochloride 25 milliGRAM(s) Oral two times a day PRN  insulin glargine Injectable (LANTUS) 12 Unit(s) SubCutaneous at bedtime  insulin lispro (ADMELOG) corrective regimen sliding scale   SubCutaneous three times a day before meals  insulin lispro (ADMELOG) corrective regimen sliding scale   SubCutaneous at bedtime  insulin lispro Injectable (ADMELOG) 9 Unit(s) SubCutaneous three times a day before meals  isosorbide   dinitrate Tablet (ISORDIL) 30 milliGRAM(s) Oral three times a day  polyethylene glycol 3350 17 Gram(s) Oral two times a day  senna 2 Tablet(s) Oral at bedtime      Physical Exam:    Vitals:  Vital Signs Last 24 Hours  T(C): 36.7 (23 @ 07:00), Max: 37 (23 @ 20:00)  HR: 76 (23 @ 08:00) (74 - 87)  BP: IABP 1:1 assisted means , aug diastolics 100-110  RR: 19 (23 @ 08:00) (16 - 26)  SpO2: 97% (23 @ 08:00) (95% - 100%)    Weight in k.2 ( @ 06:00)    I&O's Summary    21 Dec 2023 07:01  -  22 Dec 2023 07:00  --------------------------------------------------------  IN: 796 mL / OUT: 1275 mL / NET: -479 mL    22 Dec 2023 07:01  -  22 Dec 2023 08:36  --------------------------------------------------------  IN: 14 mL / OUT: 0 mL / NET: 14 mL    Tele: NSR    General: No distress. Comfortable.  HEENT: EOM intact.  Neck: Neck supple. JVP not elevated. No masses  Chest: Clear to auscultation bilaterally  CV: Normal S1 and S2. No murmurs, rub, or gallops. Radial pulses normal. R PT palpable, L DP palpable  Abdomen: Soft, non-distended, non-tender  Skin: R fem prior IABP site with old blood at site. Dressing at L IABP site intact, site without drainage or hematoma.   Neurology: Alert and oriented times three. Sensation intact  Psych: Affect normal    Labs:                        12.1   7.21  )-----------( 181      ( 21 Dec 2023 02:02 )             36.2     12    134<L>  |  104  |  32<H>  ----------------------------<  143<H>  4.3   |  19<L>  |  1.24    Ca    10.0      21 Dec 2023 02:02  Phos  3.9     12  Mg     1.9     12    TPro  6.9  /  Alb  3.9  /  TBili  0.2  /  DBili  x   /  AST  22  /  ALT  67<H>  /  AlkPhos  63  12-    PT/INR - ( 21 Dec 2023 02:02 )   PT: 12.0 sec;   INR: 1.15 ratio      PTT - ( 21 Dec 2023 02:02 )  PTT:79.1 sec    Lactate, Blood: 0.9 mmol/L ( @ 02:02)     ADVANCED HEART FAILURE & TRANSPLANT  - PROGRESS NOTE  *To reach the NS2 Team from 8am to 5pm, please call [phone]   ___________________________________________________________________________  Subjective:  - IABP exchanged to L fem. notes mild tenderness at bilateral groin site  - denies SOB, orthopnea, CP, abdominal discomfort Last BM 2 days ago.     Medications:  aspirin  chewable 81 milliGRAM(s) Oral daily  atorvastatin 80 milliGRAM(s) Oral at bedtime  bisacodyl 5 milliGRAM(s) Oral every 12 hours  budesonide  80 MICROgram(s)/formoterol 4.5 MICROgram(s) Inhaler 2 Puff(s) Inhalation two times a day  carvedilol 25 milliGRAM(s) Oral every 12 hours  chlorhexidine 2% Cloths 1 Application(s) Topical daily  heparin  Infusion 1300 Unit(s)/Hr IV Continuous <Continuous>  hepatitis B Vaccine - Adult (HEPLISAV-B) 20 MICROGram(s) IntraMuscular once  hydrALAZINE 50 milliGRAM(s) Oral every 8 hours  hydrOXYzine hydrochloride 25 milliGRAM(s) Oral two times a day PRN  insulin glargine Injectable (LANTUS) 12 Unit(s) SubCutaneous at bedtime  insulin lispro (ADMELOG) corrective regimen sliding scale   SubCutaneous three times a day before meals  insulin lispro (ADMELOG) corrective regimen sliding scale   SubCutaneous at bedtime  insulin lispro Injectable (ADMELOG) 9 Unit(s) SubCutaneous three times a day before meals  isosorbide   dinitrate Tablet (ISORDIL) 30 milliGRAM(s) Oral three times a day  polyethylene glycol 3350 17 Gram(s) Oral two times a day  senna 2 Tablet(s) Oral at bedtime      Physical Exam:    Vitals:  Vital Signs Last 24 Hours  T(C): 36.7 (23 @ 07:00), Max: 37 (23 @ 20:00)  HR: 76 (23 @ 08:00) (74 - 87)  BP: IABP 1:1 assisted means , aug diastolics 100-110  RR: 19 (23 @ 08:00) (16 - 26)  SpO2: 97% (23 @ 08:00) (95% - 100%)    Weight in k.2 ( @ 06:00)    I&O's Summary    21 Dec 2023 07:01  -  22 Dec 2023 07:00  --------------------------------------------------------  IN: 796 mL / OUT: 1275 mL / NET: -479 mL    22 Dec 2023 07:01  -  22 Dec 2023 08:36  --------------------------------------------------------  IN: 14 mL / OUT: 0 mL / NET: 14 mL    Tele: NSR    General: No distress. Comfortable.  HEENT: EOM intact.  Neck: Neck supple. JVP not elevated. No masses  Chest: Clear to auscultation bilaterally  CV: Normal S1 and S2. No murmurs, rub, or gallops. Radial pulses normal. R PT palpable, L DP palpable  Abdomen: Soft, non-distended, non-tender  Skin: R fem prior IABP site with old blood at site. Dressing at L IABP site intact, site without drainage or hematoma.   Neurology: Alert and oriented times three. Sensation intact  Psych: Affect normal    Labs:                        12.1   7.21  )-----------( 181      ( 21 Dec 2023 02:02 )             36.2     12    134<L>  |  104  |  32<H>  ----------------------------<  143<H>  4.3   |  19<L>  |  1.24    Ca    10.0      21 Dec 2023 02:02  Phos  3.9     12  Mg     1.9     12    TPro  6.9  /  Alb  3.9  /  TBili  0.2  /  DBili  x   /  AST  22  /  ALT  67<H>  /  AlkPhos  63  12-    PT/INR - ( 21 Dec 2023 02:02 )   PT: 12.0 sec;   INR: 1.15 ratio      PTT - ( 21 Dec 2023 02:02 )  PTT:79.1 sec    Lactate, Blood: 0.9 mmol/L ( @ 02:02)

## 2023-12-22 NOTE — PROGRESS NOTE ADULT - CRITICAL CARE ATTENDING COMMENT
IABP changed to left FA.   meds: heparin (79), coreg 25 bid, HDZN 50 tid, ISDN 30, Asa, statin , insulin,   HR 70-80SR, afeb, aug mean 80s, aug daist 110,   I/O: -480  12-21    134<L>  |  104  |  32<H>  ----------------------------<  143<H>  4.3   |  19<L>  |  1.24    Ca    10.0      21 Dec 2023 02:02  Phos  3.9     12-21  Mg     1.9     12-21    TPro  6.9  /  Alb  3.9  /  TBili  0.2  /  DBili  x   /  AST  22  /  ALT  67<H>  /  AlkPhos  63  12-21                        12.1   7.21  )-----------( 181      ( 21 Dec 2023 02:02 )             36.2   CICU staff have described some concerns with respect to interactions with the nursing staff over time.   patient has been reluctant to wash and have dressing changes other than with select male staff.   long discussion with patient who was very defensive and upset about this subject.   patient has agreed to allow nursing staff to change dressings when needed and wash with male staff daily  will ask Dr Villalobos to talk to patient too.  remains critically ill- UNOS IImartha, SUZY A, PRA 0  Brody Espana

## 2023-12-22 NOTE — PROGRESS NOTE ADULT - PROBLEM SELECTOR PLAN 1
-Listen UNOS status 2.   - Changed from RFA IABP to LFA IABP on 12/21. On AC with Heparin infusion (also for LV thrombus)  - Continue Coreg 25 mg BID hold for MAP <65  - Increase HDZN to 75 mg TID given MAPs in 80s and continue ISDN 30 mg TID, hold for MAP <65  - Off romel given HyperK  - AT evaluation: Accepted for transplant, currently listed UNOS Status 2. ABO A. PRA 0% 12/12.    However, downgraded to status 7 on 12/4 given + blood culture. But now re-upgraded to status 2, awaiting suitable donor  - Please keep primary Dr. Banuelos [phone] in the loop per family request. -Listen UNOS status 2.   - Changed from RFA IABP to LFA IABP on 12/21. On AC with Heparin infusion (also for LV thrombus)  - Continue Coreg 25 mg BID hold for MAP <65  - Increase HDZN to 75 mg TID given MAPs in 80s and continue ISDN 30 mg TID, hold for MAP <65  - Off romel given HyperK  - AT evaluation: Accepted for transplant, currently listed UNOS Status 2. ABO A. PRA 0% 12/12.    However, downgraded to status 7 on 12/4 given + blood culture. But now re-upgraded to status 2, awaiting suitable donor  - Please keep primary Dr. Banuelos [phone] in the loop per family request.

## 2023-12-22 NOTE — PROGRESS NOTE ADULT - ASSESSMENT
57 yo M with PMHx of HTN, CAD w/ 1 stent in 2009, ICH (2008) presented on 11/1 with abn ekg. Patient presented to [facility] where he was found to have STEMI, recommended to get cath however patient did not want to get it there so he left and came here.   EKG here with ST depression in lateral leads and elevation in anterior leads   Prior to C found to be tachycardic, dyspneic, intubated   C 11/1 with chronic total occlusion of LAD and RCA, with elevated RA and PA pressures  TTE 11/1 with severely decreased EF 32%, s/p IABP 11/1    RVP (11/1) Positive for COVID19  RVP (11/10) Negative  BCx (11/2, 11/4) NGTD  ETT Culture (11/2) NGTD  MRSA/MSSA PCR (11/2, 11/10) Negative  UA (11/10) 1 WBC    CXR (11/10) Clear Lungs  TTE (11/2) Left ventricular thrombus.    #Positive Blood Cultures (11/30 - Cutibacterium)  Growth this far from procurement raises question of contaminant  Given ID as Cutibacterium skin procurement contaminant is favored  Repeat blood cultures with NGTD  --No absolute ID contraindication to re-activate for transplant.   --Continue to follow CBC with diff  --Continue to follow temperature curve    #Rash  ?Secondary to Cefepime on 11/2?  ?Secondary to hepatitis vaccine?  ?Secondary to Vancomycin  --Appreciate Dermatology input  --Follow LFT's and CBC with Diff (For Eosinophil Counts)    #Enterococcus Bacteremia, Leukocytosis, Pre-Heart Transplant Evaluation Serologies  Concern for either central line or urinary source  CT A/P Noncontrast (11/18) No acute process  s/p ten-days of IV Vancomycin last dose received on 11/27    #Encounter to Vaccinate Patient  COVID19: COVID19 Infection This Admission. Would consider Vaccination in ~3 months  Influenza: declined  Pneumococcal: Recommend PCV20  HAV: received first dose 11/24  HBV: received first dose Heplisav 11/24  MMR: Not Mumps Immune, if >1 month until transplant would consider MMR dose  Varicella: Immune  Shingles: recommend Shingrix series  Tdap: received Tdap booster this admission    no absolute contra indications from an ID perspective to pursuing heart transplant    patient received second dose of heplisav as no abs. after first dose  patient declined Hep B NaaT + donor    Chao Peters MD  Can be called via Teams  After 5pm/weekends [phone]     59 yo M with PMHx of HTN, CAD w/ 1 stent in 2009, ICH (2008) presented on 11/1 with abn ekg. Patient presented to [facility] where he was found to have STEMI, recommended to get cath however patient did not want to get it there so he left and came here.   EKG here with ST depression in lateral leads and elevation in anterior leads   Prior to C found to be tachycardic, dyspneic, intubated   C 11/1 with chronic total occlusion of LAD and RCA, with elevated RA and PA pressures  TTE 11/1 with severely decreased EF 32%, s/p IABP 11/1    RVP (11/1) Positive for COVID19  RVP (11/10) Negative  BCx (11/2, 11/4) NGTD  ETT Culture (11/2) NGTD  MRSA/MSSA PCR (11/2, 11/10) Negative  UA (11/10) 1 WBC    CXR (11/10) Clear Lungs  TTE (11/2) Left ventricular thrombus.    #Positive Blood Cultures (11/30 - Cutibacterium)  Growth this far from procurement raises question of contaminant  Given ID as Cutibacterium skin procurement contaminant is favored  Repeat blood cultures with NGTD  --No absolute ID contraindication to re-activate for transplant.   --Continue to follow CBC with diff  --Continue to follow temperature curve    #Rash  ?Secondary to Cefepime on 11/2?  ?Secondary to hepatitis vaccine?  ?Secondary to Vancomycin  --Appreciate Dermatology input  --Follow LFT's and CBC with Diff (For Eosinophil Counts)    #Enterococcus Bacteremia, Leukocytosis, Pre-Heart Transplant Evaluation Serologies  Concern for either central line or urinary source  CT A/P Noncontrast (11/18) No acute process  s/p ten-days of IV Vancomycin last dose received on 11/27    #Encounter to Vaccinate Patient  COVID19: COVID19 Infection This Admission. Would consider Vaccination in ~3 months  Influenza: declined  Pneumococcal: Recommend PCV20  HAV: received first dose 11/24  HBV: received first dose Heplisav 11/24  MMR: Not Mumps Immune, if >1 month until transplant would consider MMR dose  Varicella: Immune  Shingles: recommend Shingrix series  Tdap: received Tdap booster this admission    no absolute contra indications from an ID perspective to pursuing heart transplant    patient received second dose of heplisav as no abs. after first dose  patient declined Hep B NaaT + donor    Chao Peters MD  Can be called via Teams  After 5pm/weekends [phone]     59 yo M with PMHx of HTN, CAD w/ 1 stent in 2009, ICH (2008) presented on 11/1 with abn ekg. Patient presented to [facility] where he was found to have STEMI, recommended to get cath however patient did not want to get it there so he left and came here.   EKG here with ST depression in lateral leads and elevation in anterior leads   Prior to C found to be tachycardic, dyspneic, intubated   C 11/1 with chronic total occlusion of LAD and RCA, with elevated RA and PA pressures  TTE 11/1 with severely decreased EF 32%, s/p IABP 11/1    RVP (11/1) Positive for COVID19  RVP (11/10) Negative  BCx (11/2, 11/4) NGTD  ETT Culture (11/2) NGTD  MRSA/MSSA PCR (11/2, 11/10) Negative  UA (11/10) 1 WBC    CXR (11/10) Clear Lungs  TTE (11/2) Left ventricular thrombus.    #Positive Blood Cultures (11/30 - Cutibacterium)  Growth this far from procurement raises question of contaminant  Given ID as Cutibacterium skin procurement contaminant is favored  Repeat blood cultures with NGTD  --No absolute ID contraindication for transplant listing.   --Continue to follow CBC with diff  --Continue to follow temperature curve    #Rash  resolved    #Encounter to Vaccinate Patient  COVID19: COVID19 Infection This Admission. Would consider Vaccination in ~3 months  Influenza: declined  Pneumococcal: Recommend PCV20  HAV: received first dose 11/24  HBV: received first dose Heplisav 11/24  MMR: Not Mumps Immune, if >1 month until transplant would consider MMR dose  Varicella: Immune  Shingles: recommend Shingrix series  Tdap: received Tdap booster this admission    no absolute contra indications from an ID perspective to pursuing heart transplant    patient received second dose of heplisav as no abs. after first dose  repeat Hep B Sab quantitative in 2 weeks  patient declined Hep B NaaT + donor continues to be listed for heart transplant    Chao Peters MD  Can be called via Teams  After 5pm/weekends [phone]     57 yo M with PMHx of HTN, CAD w/ 1 stent in 2009, ICH (2008) presented on 11/1 with abn ekg. Patient presented to [facility] where he was found to have STEMI, recommended to get cath however patient did not want to get it there so he left and came here.   EKG here with ST depression in lateral leads and elevation in anterior leads   Prior to C found to be tachycardic, dyspneic, intubated   C 11/1 with chronic total occlusion of LAD and RCA, with elevated RA and PA pressures  TTE 11/1 with severely decreased EF 32%, s/p IABP 11/1    RVP (11/1) Positive for COVID19  RVP (11/10) Negative  BCx (11/2, 11/4) NGTD  ETT Culture (11/2) NGTD  MRSA/MSSA PCR (11/2, 11/10) Negative  UA (11/10) 1 WBC    CXR (11/10) Clear Lungs  TTE (11/2) Left ventricular thrombus.    #Positive Blood Cultures (11/30 - Cutibacterium)  Growth this far from procurement raises question of contaminant  Given ID as Cutibacterium skin procurement contaminant is favored  Repeat blood cultures with NGTD  --No absolute ID contraindication for transplant listing.   --Continue to follow CBC with diff  --Continue to follow temperature curve    #Rash  resolved    #Encounter to Vaccinate Patient  COVID19: COVID19 Infection This Admission. Would consider Vaccination in ~3 months  Influenza: declined  Pneumococcal: Recommend PCV20  HAV: received first dose 11/24  HBV: received first dose Heplisav 11/24  MMR: Not Mumps Immune, if >1 month until transplant would consider MMR dose  Varicella: Immune  Shingles: recommend Shingrix series  Tdap: received Tdap booster this admission    no absolute contra indications from an ID perspective to pursuing heart transplant    patient received second dose of heplisav as no abs. after first dose  repeat Hep B Sab quantitative in 2 weeks  patient declined Hep B NaaT + donor continues to be listed for heart transplant    Chao Peters MD  Can be called via Teams  After 5pm/weekends [phone]

## 2023-12-22 NOTE — PROVIDER CONTACT NOTE (OTHER) - REASON
Pt refusing PIV, midline, and AM care
Patient refusing AM care and wash
pt refusing am xray
pt noncompliant with IV change, refuses am care and chlorohexadine wash, noncompliant with diabetes diet
patient refusing to wear continuous pulse ox
Patient reporting bladder fullness
Pt HR fluctuating between 110-130s.
Patient Refused CHG Wash
Patient refusing wash and AM care

## 2023-12-22 NOTE — PROGRESS NOTE ADULT - SUBJECTIVE AND OBJECTIVE BOX
DEVORAH VALENCIA  MRN-[id number]  Patient is a 58y old  Male who presents with a chief complaint of advanced cardiac therapies evaluation (22 Dec 2023 19:41)    HPI:  pt seen and approx 1:30 pm  in [facility]    59yo M w/ hx HTN, CAD w/ 1 stent in , ICH () presenting with abn ekg. Patient presented to [facility] where he was found to have STEMI, recommended to get cath however patient did not want to get it there so it left and came here.  Patient initially had cough, congestion, fever, was placed on antibiotics on .  Started feeling nauseous and had a presyncopal event after which he presented to ED last night.  Had chest pain as well.  Chest pain is midsternal.  Not currently having chest pain.  Received 4 aspirin 30 min pta. (2023 15:11)      Hospital Course:    24 HOUR EVENTS:    REVIEW OF SYSTEMS:    CONSTITUTIONAL: No weakness, fevers or chills  EYES/ENT: No visual changes;  No vertigo or throat pain   NECK: No pain or stiffness  RESPIRATORY: No cough, wheezing, hemoptysis; No shortness of breath  CARDIOVASCULAR: No chest pain or palpitations  GASTROINTESTINAL: No abdominal or epigastric pain. No nausea, vomiting, or hematemesis; No diarrhea or constipation. No melena or hematochezia.  GENITOURINARY: No dysuria, frequency or hematuria  NEUROLOGICAL: No numbness or weakness  SKIN: No itching, rashes      ICU Vital Signs Last 24 Hrs  T(C): 36.8 (22 Dec 2023 19:00), Max: 36.9 (22 Dec 2023 00:00)  T(F): 98.2 (22 Dec 2023 19:00), Max: 98.4 (22 Dec 2023 00:00)  HR: 77 (22 Dec 2023 20:00) (71 - 87)  BP: --  BP(mean): --  ABP: --  ABP(mean): --  RR: 29 (22 Dec 2023 20:00) (16 - 29)  SpO2: 95% (22 Dec 2023 18:00) (95% - 100%)    O2 Parameters below as of 22 Dec 2023 19:00  Patient On (Oxygen Delivery Method): room air            CVP(mm Hg): --  CO: --  CI: --  PA: --  PA(mean): --  PA(direct): --  PCWP: --  LA: --  RA: --  SVR: --  SVRI: --  PVR: --  PVRI: --  I&O's Summary    21 Dec 2023 07:01  -  22 Dec 2023 07:00  --------------------------------------------------------  IN: 796 mL / OUT: 1275 mL / NET: -479 mL    22 Dec 2023 07:01  -  22 Dec 2023 20:05  --------------------------------------------------------  IN: 978 mL / OUT: 500 mL / NET: 478 mL        CAPILLARY BLOOD GLUCOSE    CAPILLARY BLOOD GLUCOSE      POCT Blood Glucose.: 229 mg/dL (22 Dec 2023 12:10)      PHYSICAL EXAM:  GENERAL: No acute distress, well-developed  HEAD:  Atraumatic, Normocephalic  EYES: EOMI, PERRLA, conjunctiva and sclera clear  NECK: Supple, no lymphadenopathy, no JVD  CHEST/LUNG: CTAB; No wheezes, rales, or rhonchi  HEART: Regular rate and rhythm. Normal S1/S2. No murmurs, rubs, or gallops  ABDOMEN: Soft, non-tender, non-distended; normal bowel sounds, no organomegaly  EXTREMITIES:  2+ peripheral pulses b/l, No clubbing, cyanosis, or edema  NEUROLOGY: A&O x 3, no focal deficits  SKIN: No rashes or lesions    ============================I/O===========================   I&O's Detail    21 Dec 2023 07:01  -  22 Dec 2023 07:00  --------------------------------------------------------  IN:    Heparin: 196 mL    Oral Fluid: 600 mL  Total IN: 796 mL    OUT:    Voided (mL): 1275 mL  Total OUT: 1275 mL    Total NET: -479 mL      22 Dec 2023 07:01  -  22 Dec 2023 20:05  --------------------------------------------------------  IN:    Heparin: 98 mL    Oral Fluid: 880 mL  Total IN: 978 mL    OUT:    Voided (mL): 500 mL  Total OUT: 500 mL    Total NET: 478 mL        ============================ LABS =========================                        12.1   7.  )-----------( 181      ( 21 Dec 2023 02:02 )             36.2         134<L>  |  104  |  32<H>  ----------------------------<  143<H>  4.3   |  19<L>  |  1.24    Ca    10.0      21 Dec 2023 02:02  Phos  3.9       Mg     1.9         TPro  6.9  /  Alb  3.9  /  TBili  0.2  /  DBili  x   /  AST  22  /  ALT  67<H>  /  AlkPhos  63                  LIVER FUNCTIONS - ( 21 Dec 2023 02:02 )  Alb: 3.9 g/dL / Pro: 6.9 g/dL / ALK PHOS: 63 U/L / ALT: 67 U/L / AST: 22 U/L / GGT: x           PT/INR - ( 22 Dec 2023 10:20 )   PT: 11.7 sec;   INR: 1.12 ratio         PTT - ( 22 Dec 2023 10:20 )  PTT:63.8 sec    Lactate, Blood: 0.9 mmol/L (23 @ 02:02)    Urinalysis Basic - ( 21 Dec 2023 02:02 )    Color: x / Appearance: x / SG: x / pH: x  Gluc: 143 mg/dL / Ketone: x  / Bili: x / Urobili: x   Blood: x / Protein: x / Nitrite: x   Leuk Esterase: x / RBC: x / WBC x   Sq Epi: x / Non Sq Epi: x / Bacteria: x      ======================Micro/Rad/Cardio=================  Telemtry: Reviewed   EKG: Reviewed  CXR: Reviewed  Culture: Reviewed   Echo:   Cath: Cardiac Cath Lab - Adult:   [facility]  Department of Cardiology  [address] [place] [address]  [phone]  Cath Lab Report -- Comprehensive Report  Patient: LD VALENCIA  Study date: 2017  Account number: [id number]  MR number: [id number]  : 1965  Gender: Male  Race: W  Case Physician(s):  Jairon Holguin M.D.  Referring Physician:  Luc Lynn M.D.  INDICATIONS: Unstable angina - CCS4.  HISTORY: The patient has a history of coronary artery disease. The patient  hashypertension and medication-treated dyslipidemia.  PROCEDURE:  --  Left heart catheterization with ventriculography.  --  Left coronary angiography.  --  Right coronary angiography.  TECHNIQUE: The risks and alternatives of the procedures and conscious  sedation were explained to the patient and informed consent was obtained.  Cardiac catheterization performed electively.  Local anesthetic given. Right radial artery access. A 6FR PRELUDE KIT was  inserted in the vessel. Left heart catheterization. Ventriculography was  performed. A 5FR FR4.0 EXPO catheter was utilized. Left coronary artery  angiography. The vessel was injected utilizing a 5FR FL3.5 EXPO catheter.  Right coronary artery angiography. The vessel was injected utilizing a 5FR  FR4.0 EXPO catheter. RADIATION EXPOSURE: 1.1 min.  CONTRAST GIVEN: Omnipaque 55 ml.  MEDICATIONS GIVEN: Midazolam, 1 mg, IV. Fentanyl, 25 mcg, IV. Verapamil  (Isoptin, Calan, Covera), 2.5 mg, IA. Heparin, 3000 units, IA.  VENTRICLES: Global left ventricular function was moderately depressed. EF  estimated was 40 %.  CORONARY VESSELS: The coronary circulation is right dominant.  LM:   --  LM: Normal.  LAD:   --  Proximal LAD: There was a 50 % stenosis.  CX:   --  Circumflex: Normal.  RCA:   --  Mid RCA: There was a 40 % stenosis.  --  Distal RCA: There was a 50 % stenosis.  COMPLICATIONS: There were no complications.  DIAGNOSTIC RECOMMENDATIONS: The patient should continue with the present  medications.  Prepared and signed by  Jairon Holguin M.D.  Signed 2017 12:20:13  HEMODYNAMIC TABLES  Pressures:  Baseline/ Room Air  Pressures:  - HR: 78  Pressures:  - Rhythm:  Pressures:  -- Aortic Pressure (S/D/M): --/--/99  Pressures:  -- Left Ventricle (s/edp): 157/39/--  Outputs:  Baseline/ Room Air  Outputs:  -- CALCULATIONS[age]  Outputs:  -- CALCULATIONS: Body Surface Area: 2.05  Outputs:  -- CALCULATIONS: Height in cm: 175.00  Outputs:  -- CALCULATIONS: Sex: Male  Outputs:  -- CALCULATIONS: Weight in k.40 (17 @ 21:55)    ======================================================  PAST MEDICAL & SURGICAL HISTORY:  HTN (hypertension)      CAD (coronary artery disease)  ; stent      Intracranial hemorrhage        Respiratory arrest        Myocardial infarction, unspecified MI type, unspecified artery      History of coronary artery stent placement  ====================ASSESSMENT ==============  58 male with HTN, CAD (s/p PCI ), HFrEF, CVA , and T2DM presenting with chest pressure and unknown tachycardia that was shocked x1, [facility]  found to have in-stent restenosis of pLAD and  of RCA, admitted to CICU for management of cardiogenic shock and ADHF requiring IABP  -, with hospital course c/b vfib arrest requiring reinsertion of IABP, currently listed for transplant status 2.    ====================== NEUROLOGY=====================  Anxiety  - No Seroquel/antipsychotics since vfib arrest and prolonged QTC  - Psych Eval, recommended SSRI, but pt. refused   - Psych recommending atarax PRN  - Continue to monitor mental status    PT/Conditioning  - Continue band exercises while on bedrest s/t IABP    ==================== RESPIRATORY======================  Acute Hypoxemic Respiratory Failure  - s/p x2 intubations for cardiogenic pulm edema and the in setting of cardiac arrest, resolved - extubated 11/10  - Currently maintaining >95% sats on room air  - Continue incentive spirometry and monitoring of sp02    Asthma  - c/w symbicort  - On trelegy at home  - Continue to monitor SpO2 with goal >94%    ====================CARDIOVASCULAR==================  Vfib arrest i/s/o ischemia  - Lido gtt off   - PO Amio load - total of 5g per EP complete   - Amio dc'd  for rising LFTs  - Keep K > 4, Mag > 2.2     Cardiogenic shock requiring IABP (- , -)  - Likely 2/2 NSTEMI and ADHF  -  LHC: pLAD 100 % in-stent restenosis & mRCA, 100 %. PCWP 30. IABP placed.  -  TTE: LV dilated. EF 32 %. Regional WMAs present, mod (grade 2) LV diastolic dysfunction  -  TTE: EF 22% and + LV thrombus  - appears euvolemic  - IABP swapped to LFA , continue 1:1 support  - hydralazine 50 TID and ISD 30 TID for AL reduction  - c/w coreg 25 BID for GDMT  - [place] d/c'd due to elevated K levels  - UNOS status 2 as of     NSTEMI iso stent re-occlusion of pLAD and 100%  of RCA  - EKG on admission w/ LBBB  - DAPT: c/w ASA, Brilinta d/c'd per transplant w/u  - c/w lipitor 80  - cMR deferred given necessity of IABP  - CT sx not recommending CABG, undergoing AT eval    LV thrombus  - c/w heparin gtt w/ therapeutic PTT    ===================== RENAL =========================  Non-oliguric ARIC, resolved  - Cr .  - Baseline Cr: -  - Renal US: no evidence of renal artery stenosis  - Trend BMP, lytes daily, replace as needed  - Continue Strict I/Os, avoid nephrotoxins    =============== GASTROINTESTINAL===================  Constipation/ileus, resolved  - regular diet  - bowel reg prn    ===================ENDO====================  Type 2 DM  - A1c 8.3, glucose controlled  - Continue lantus, premeal, low ISS  - continue FS monitoring    ===================HEMATOLOGIC/ONC ===================  - H/H & plts stable, no s/sx of bleeding  - Monitor H/H and plts q48h  - VTE PPX: heparin gtt    ==================INFECTIOUS DISEASE================  Positive blood culture, resolved  - Repeat BCx drawn  for leukocytosis to 12k - positive on , G+ rods in anaerobic bottle, suspect contaminant  - Repeat cultures x2 sent  per transplant ID recs - no growth to date  - Vancomycin by trough (-)  - Final microbial ID: Cutibacterium acnes, per ID this is likely to be a skin contaminant, vanc dc'd.   - Dental eval  to rule out infectious etiology that would have led to bacteremia, no significant findings on exam.     Enterococcus faecalis bacteremia, resolved  - BCx + for enterococcus faecalis x2, Staph epi x1 (likely contaminant)- pan sensitive   - Urine cx  + enterococcus faecalis  - BCx  no growth   - IABP site swapped to RFA   - s/p Vancomycin 1g q12h (-)  - CT A/P negative for infectious pathology    COVID, resolved  - Off airborne precautions     Pre-transplant ID w/u   - Trend ID recs for serologies   - Colonoscopy  - normal   - Chest CT  - improved LLL aeration  - s/p immunizations    ==================DERMATOLOGY================   Upper Extremity Rash  - Patient developed bilateral upper extremity rash after receiving Hepatitis A & B immunizations on   - Dermatology consulted  - not likely to be related to vanco  - Recommend clobetasol 0.05% BID to affected areas   - RVP repeated to rule out viral etiology, negative    Left neck mass  - US soft tissue:  lateral left neck 2.0 x 0.7 x 1.4 cm heterogeneous complex solid and cystic appearing lesion in subcutaneous soft tissue extending into subjacent strap muscle  - vasc cardiology consult: JOSE J venous duplex US ordered,  - Subcentimeter lymph nodes are present in the left neck and axilla, No evidence of left upper extremity DVT, Superficial thrombosis of the cephalic vein in the left upper arm.  - continue to monitor site    Lines: RFA IABP (-), RRA (-), LFA IABP (-    Patient requires continuous monitoring with bedside rhythm monitoring, pulse ox monitoring, and intermittent blood gas analysis. Care plan discussed with ICU care team. Patient remained critical and at risk for life threatening decompensation.  Patient seen, examined and plan discussed with CCU team during rounds.     I have personally provided ____ minutes of critical care time excluding time spent on separate procedures, in addition to initial critical care time provided by the CICU Attending, Dr. Durand.    By signing my name below, I, Kalyn Sousa, attest that this documentation has been prepared under the direction and in the presence of Kirsty Davis NP.   Electronically signed: Rosalba Booth, 23 @ 20:05    I, Kirsty Davis  , personally performed the services described in this documentation. all medical record entries made by the scribe were at my direction and in my presence. I have reviewed the chart and agree that the record reflects my personal performance and is accurate and complete  Electronically signed: Kirsty Davis NP.        DEVORAH VALENCIA  MRN-[id number]  Patient is a 58y old  Male who presents with a chief complaint of advanced cardiac therapies evaluation (22 Dec 2023 19:41)    HPI:  pt seen and approx 1:30 pm  in [facility]    57yo M w/ hx HTN, CAD w/ 1 stent in , ICH () presenting with abn ekg. Patient presented to [facility] where he was found to have STEMI, recommended to get cath however patient did not want to get it there so it left and came here.  Patient initially had cough, congestion, fever, was placed on antibiotics on .  Started feeling nauseous and had a presyncopal event after which he presented to ED last night.  Had chest pain as well.  Chest pain is midsternal.  Not currently having chest pain.  Received 4 aspirin 30 min pta. (2023 15:11)      Hospital Course:    24 HOUR EVENTS:    REVIEW OF SYSTEMS:    CONSTITUTIONAL: No weakness, fevers or chills  EYES/ENT: No visual changes;  No vertigo or throat pain   NECK: No pain or stiffness  RESPIRATORY: No cough, wheezing, hemoptysis; No shortness of breath  CARDIOVASCULAR: No chest pain or palpitations  GASTROINTESTINAL: No abdominal or epigastric pain. No nausea, vomiting, or hematemesis; No diarrhea or constipation. No melena or hematochezia.  GENITOURINARY: No dysuria, frequency or hematuria  NEUROLOGICAL: No numbness or weakness  SKIN: No itching, rashes      ICU Vital Signs Last 24 Hrs  T(C): 36.8 (22 Dec 2023 19:00), Max: 36.9 (22 Dec 2023 00:00)  T(F): 98.2 (22 Dec 2023 19:00), Max: 98.4 (22 Dec 2023 00:00)  HR: 77 (22 Dec 2023 20:00) (71 - 87)  BP: --  BP(mean): --  ABP: --  ABP(mean): --  RR: 29 (22 Dec 2023 20:00) (16 - 29)  SpO2: 95% (22 Dec 2023 18:00) (95% - 100%)    O2 Parameters below as of 22 Dec 2023 19:00  Patient On (Oxygen Delivery Method): room air            CVP(mm Hg): --  CO: --  CI: --  PA: --  PA(mean): --  PA(direct): --  PCWP: --  LA: --  RA: --  SVR: --  SVRI: --  PVR: --  PVRI: --  I&O's Summary    21 Dec 2023 07:01  -  22 Dec 2023 07:00  --------------------------------------------------------  IN: 796 mL / OUT: 1275 mL / NET: -479 mL    22 Dec 2023 07:01  -  22 Dec 2023 20:05  --------------------------------------------------------  IN: 978 mL / OUT: 500 mL / NET: 478 mL        CAPILLARY BLOOD GLUCOSE    CAPILLARY BLOOD GLUCOSE      POCT Blood Glucose.: 229 mg/dL (22 Dec 2023 12:10)      PHYSICAL EXAM:  GENERAL: No acute distress, well-developed  HEAD:  Atraumatic, Normocephalic  EYES: EOMI, PERRLA, conjunctiva and sclera clear  NECK: Supple, no lymphadenopathy, no JVD  CHEST/LUNG: CTAB; No wheezes, rales, or rhonchi  HEART: Regular rate and rhythm. Normal S1/S2. No murmurs, rubs, or gallops  ABDOMEN: Soft, non-tender, non-distended; normal bowel sounds, no organomegaly  EXTREMITIES:  2+ peripheral pulses b/l, No clubbing, cyanosis, or edema  NEUROLOGY: A&O x 3, no focal deficits  SKIN: No rashes or lesions    ============================I/O===========================   I&O's Detail    21 Dec 2023 07:01  -  22 Dec 2023 07:00  --------------------------------------------------------  IN:    Heparin: 196 mL    Oral Fluid: 600 mL  Total IN: 796 mL    OUT:    Voided (mL): 1275 mL  Total OUT: 1275 mL    Total NET: -479 mL      22 Dec 2023 07:01  -  22 Dec 2023 20:05  --------------------------------------------------------  IN:    Heparin: 98 mL    Oral Fluid: 880 mL  Total IN: 978 mL    OUT:    Voided (mL): 500 mL  Total OUT: 500 mL    Total NET: 478 mL        ============================ LABS =========================                        12.1   7.  )-----------( 181      ( 21 Dec 2023 02:02 )             36.2         134<L>  |  104  |  32<H>  ----------------------------<  143<H>  4.3   |  19<L>  |  1.24    Ca    10.0      21 Dec 2023 02:02  Phos  3.9       Mg     1.9         TPro  6.9  /  Alb  3.9  /  TBili  0.2  /  DBili  x   /  AST  22  /  ALT  67<H>  /  AlkPhos  63                  LIVER FUNCTIONS - ( 21 Dec 2023 02:02 )  Alb: 3.9 g/dL / Pro: 6.9 g/dL / ALK PHOS: 63 U/L / ALT: 67 U/L / AST: 22 U/L / GGT: x           PT/INR - ( 22 Dec 2023 10:20 )   PT: 11.7 sec;   INR: 1.12 ratio         PTT - ( 22 Dec 2023 10:20 )  PTT:63.8 sec    Lactate, Blood: 0.9 mmol/L (23 @ 02:02)    Urinalysis Basic - ( 21 Dec 2023 02:02 )    Color: x / Appearance: x / SG: x / pH: x  Gluc: 143 mg/dL / Ketone: x  / Bili: x / Urobili: x   Blood: x / Protein: x / Nitrite: x   Leuk Esterase: x / RBC: x / WBC x   Sq Epi: x / Non Sq Epi: x / Bacteria: x      ======================Micro/Rad/Cardio=================  Telemtry: Reviewed   EKG: Reviewed  CXR: Reviewed  Culture: Reviewed   Echo:   Cath: Cardiac Cath Lab - Adult:   [facility]  Department of Cardiology  [address] [place] [address]  [phone]  Cath Lab Report -- Comprehensive Report  Patient: LD VALENCIA  Study date: 2017  Account number: [id number]  MR number: [id number]  : 1965  Gender: Male  Race: W  Case Physician(s):  Jairon Holguin M.D.  Referring Physician:  Luc Lynn M.D.  INDICATIONS: Unstable angina - CCS4.  HISTORY: The patient has a history of coronary artery disease. The patient  hashypertension and medication-treated dyslipidemia.  PROCEDURE:  --  Left heart catheterization with ventriculography.  --  Left coronary angiography.  --  Right coronary angiography.  TECHNIQUE: The risks and alternatives of the procedures and conscious  sedation were explained to the patient and informed consent was obtained.  Cardiac catheterization performed electively.  Local anesthetic given. Right radial artery access. A 6FR PRELUDE KIT was  inserted in the vessel. Left heart catheterization. Ventriculography was  performed. A 5FR FR4.0 EXPO catheter was utilized. Left coronary artery  angiography. The vessel was injected utilizing a 5FR FL3.5 EXPO catheter.  Right coronary artery angiography. The vessel was injected utilizing a 5FR  FR4.0 EXPO catheter. RADIATION EXPOSURE: 1.1 min.  CONTRAST GIVEN: Omnipaque 55 ml.  MEDICATIONS GIVEN: Midazolam, 1 mg, IV. Fentanyl, 25 mcg, IV. Verapamil  (Isoptin, Calan, Covera), 2.5 mg, IA. Heparin, 3000 units, IA.  VENTRICLES: Global left ventricular function was moderately depressed. EF  estimated was 40 %.  CORONARY VESSELS: The coronary circulation is right dominant.  LM:   --  LM: Normal.  LAD:   --  Proximal LAD: There was a 50 % stenosis.  CX:   --  Circumflex: Normal.  RCA:   --  Mid RCA: There was a 40 % stenosis.  --  Distal RCA: There was a 50 % stenosis.  COMPLICATIONS: There were no complications.  DIAGNOSTIC RECOMMENDATIONS: The patient should continue with the present  medications.  Prepared and signed by  Jairon Holguin M.D.  Signed 2017 12:20:13  HEMODYNAMIC TABLES  Pressures:  Baseline/ Room Air  Pressures:  - HR: 78  Pressures:  - Rhythm:  Pressures:  -- Aortic Pressure (S/D/M): --/--/99  Pressures:  -- Left Ventricle (s/edp): 157/39/--  Outputs:  Baseline/ Room Air  Outputs:  -- CALCULATIONS[age]  Outputs:  -- CALCULATIONS: Body Surface Area: 2.05  Outputs:  -- CALCULATIONS: Height in cm: 175.00  Outputs:  -- CALCULATIONS: Sex: Male  Outputs:  -- CALCULATIONS: Weight in k.40 (17 @ 21:55)    ======================================================  PAST MEDICAL & SURGICAL HISTORY:  HTN (hypertension)      CAD (coronary artery disease)  ; stent      Intracranial hemorrhage        Respiratory arrest        Myocardial infarction, unspecified MI type, unspecified artery      History of coronary artery stent placement  ====================ASSESSMENT ==============  58 male with HTN, CAD (s/p PCI ), HFrEF, CVA , and T2DM presenting with chest pressure and unknown tachycardia that was shocked x1, [facility]  found to have in-stent restenosis of pLAD and  of RCA, admitted to CICU for management of cardiogenic shock and ADHF requiring IABP  -, with hospital course c/b vfib arrest requiring reinsertion of IABP, currently listed for transplant status 2.    ====================== NEUROLOGY=====================  Anxiety  - No Seroquel/antipsychotics since vfib arrest and prolonged QTC  - Psych Eval, recommended SSRI, but pt. refused   - Psych recommending atarax PRN  - Continue to monitor mental status    PT/Conditioning  - Continue band exercises while on bedrest s/t IABP    ==================== RESPIRATORY======================  Acute Hypoxemic Respiratory Failure  - s/p x2 intubations for cardiogenic pulm edema and the in setting of cardiac arrest, resolved - extubated 11/10  - Currently maintaining >95% sats on room air  - Continue incentive spirometry and monitoring of sp02    Asthma  - c/w symbicort  - On trelegy at home  - Continue to monitor SpO2 with goal >94%    ====================CARDIOVASCULAR==================  Vfib arrest i/s/o ischemia  - Lido gtt off   - PO Amio load - total of 5g per EP complete   - Amio dc'd  for rising LFTs  - Keep K > 4, Mag > 2.2     Cardiogenic shock requiring IABP (- , -)  - Likely 2/2 NSTEMI and ADHF  -  LHC: pLAD 100 % in-stent restenosis & mRCA, 100 %. PCWP 30. IABP placed.  -  TTE: LV dilated. EF 32 %. Regional WMAs present, mod (grade 2) LV diastolic dysfunction  -  TTE: EF 22% and + LV thrombus  - appears euvolemic  - IABP swapped to LFA , continue 1:1 support  - hydralazine 50 TID and ISD 30 TID for AL reduction  - c/w coreg 25 BID for GDMT  - [place] d/c'd due to elevated K levels  - UNOS status 2 as of     NSTEMI iso stent re-occlusion of pLAD and 100%  of RCA  - EKG on admission w/ LBBB  - DAPT: c/w ASA, Brilinta d/c'd per transplant w/u  - c/w lipitor 80  - cMR deferred given necessity of IABP  - CT sx not recommending CABG, undergoing AT eval    LV thrombus  - c/w heparin gtt w/ therapeutic PTT    ===================== RENAL =========================  Non-oliguric ARIC, resolved  - Cr .  - Baseline Cr: -  - Renal US: no evidence of renal artery stenosis  - Trend BMP, lytes daily, replace as needed  - Continue Strict I/Os, avoid nephrotoxins    =============== GASTROINTESTINAL===================  Constipation/ileus, resolved  - regular diet  - bowel reg prn    ===================ENDO====================  Type 2 DM  - A1c 8.3, glucose controlled  - Continue lantus, premeal, low ISS  - continue FS monitoring    ===================HEMATOLOGIC/ONC ===================  - H/H & plts stable, no s/sx of bleeding  - Monitor H/H and plts q48h  - VTE PPX: heparin gtt    ==================INFECTIOUS DISEASE================  Positive blood culture, resolved  - Repeat BCx drawn  for leukocytosis to 12k - positive on , G+ rods in anaerobic bottle, suspect contaminant  - Repeat cultures x2 sent  per transplant ID recs - no growth to date  - Vancomycin by trough (-)  - Final microbial ID: Cutibacterium acnes, per ID this is likely to be a skin contaminant, vanc dc'd.   - Dental eval  to rule out infectious etiology that would have led to bacteremia, no significant findings on exam.     Enterococcus faecalis bacteremia, resolved  - BCx + for enterococcus faecalis x2, Staph epi x1 (likely contaminant)- pan sensitive   - Urine cx  + enterococcus faecalis  - BCx  no growth   - IABP site swapped to RFA   - s/p Vancomycin 1g q12h (-)  - CT A/P negative for infectious pathology    COVID, resolved  - Off airborne precautions     Pre-transplant ID w/u   - Trend ID recs for serologies   - Colonoscopy  - normal   - Chest CT  - improved LLL aeration  - s/p immunizations    ==================DERMATOLOGY================   Upper Extremity Rash  - Patient developed bilateral upper extremity rash after receiving Hepatitis A & B immunizations on   - Dermatology consulted  - not likely to be related to vanco  - Recommend clobetasol 0.05% BID to affected areas   - RVP repeated to rule out viral etiology, negative    Left neck mass  - US soft tissue:  lateral left neck 2.0 x 0.7 x 1.4 cm heterogeneous complex solid and cystic appearing lesion in subcutaneous soft tissue extending into subjacent strap muscle  - vasc cardiology consult: JOSE J venous duplex US ordered,  - Subcentimeter lymph nodes are present in the left neck and axilla, No evidence of left upper extremity DVT, Superficial thrombosis of the cephalic vein in the left upper arm.  - continue to monitor site    Lines: RFA IABP (-), RRA (-), LFA IABP (-    Patient requires continuous monitoring with bedside rhythm monitoring, pulse ox monitoring, and intermittent blood gas analysis. Care plan discussed with ICU care team. Patient remained critical and at risk for life threatening decompensation.  Patient seen, examined and plan discussed with CCU team during rounds.     I have personally provided ____ minutes of critical care time excluding time spent on separate procedures, in addition to initial critical care time provided by the CICU Attending, Dr. Durand.    By signing my name below, I, Kalyn Sousa, attest that this documentation has been prepared under the direction and in the presence of Kirsty Davis NP.   Electronically signed: Rosalba Booth, 23 @ 20:05    I, Kirsty Dvais  , personally performed the services described in this documentation. all medical record entries made by the scribe were at my direction and in my presence. I have reviewed the chart and agree that the record reflects my personal performance and is accurate and complete  Electronically signed: Kirsty Davis NP.        DEVORAH VALENCIA  MRN-[id number]  Patient is a 58y old  Male who presents with a chief complaint of advanced cardiac therapies evaluation (22 Dec 2023 19:41)    HPI:  pt seen and approx 1:30 pm  in [facility]    59yo M w/ hx HTN, CAD w/ 1 stent in , ICH () presenting with abn ekg. Patient presented to [facility] where he was found to have STEMI, recommended to get cath however patient did not want to get it there so it left and came here.  Patient initially had cough, congestion, fever, was placed on antibiotics on .  Started feeling nauseous and had a presyncopal event after which he presented to ED last night.  Had chest pain as well.  Chest pain is midsternal.  Not currently having chest pain.  Received 4 aspirin 30 min pta. (2023 15:11)      Hospital Course:    24 HOUR EVENTS:    REVIEW OF SYSTEMS:    CONSTITUTIONAL: No weakness, fevers or chills  EYES/ENT: No visual changes;  No vertigo or throat pain   NECK: No pain or stiffness  RESPIRATORY: No cough, wheezing, hemoptysis; No shortness of breath  CARDIOVASCULAR: No chest pain or palpitations  GASTROINTESTINAL: No abdominal or epigastric pain. No nausea, vomiting, or hematemesis; No diarrhea or constipation. No melena or hematochezia.  GENITOURINARY: No dysuria, frequency or hematuria  NEUROLOGICAL: No numbness or weakness  SKIN: No itching, rashes      ICU Vital Signs Last 24 Hrs  T(C): 36.8 (22 Dec 2023 19:00), Max: 36.9 (22 Dec 2023 00:00)  T(F): 98.2 (22 Dec 2023 19:00), Max: 98.4 (22 Dec 2023 00:00)  HR: 77 (22 Dec 2023 20:00) (71 - 87)  BP: --  BP(mean): --  ABP: --  ABP(mean): --  RR: 29 (22 Dec 2023 20:00) (16 - 29)  SpO2: 95% (22 Dec 2023 18:00) (95% - 100%)    O2 Parameters below as of 22 Dec 2023 19:00  Patient On (Oxygen Delivery Method): room air            CVP(mm Hg): --  CO: --  CI: --  PA: --  PA(mean): --  PA(direct): --  PCWP: --  LA: --  RA: --  SVR: --  SVRI: --  PVR: --  PVRI: --  I&O's Summary    21 Dec 2023 07:01  -  22 Dec 2023 07:00  --------------------------------------------------------  IN: 796 mL / OUT: 1275 mL / NET: -479 mL    22 Dec 2023 07:01  -  22 Dec 2023 20:05  --------------------------------------------------------  IN: 978 mL / OUT: 500 mL / NET: 478 mL        CAPILLARY BLOOD GLUCOSE    CAPILLARY BLOOD GLUCOSE      POCT Blood Glucose.: 229 mg/dL (22 Dec 2023 12:10)      PHYSICAL EXAM:  GENERAL: No acute distress, well-developed  HEAD:  Atraumatic, Normocephalic  EYES: EOMI, PERRLA, conjunctiva and sclera clear  NECK: Supple, no lymphadenopathy, no JVD  CHEST/LUNG: CTAB; No wheezes, rales, or rhonchi  HEART: Regular rate and rhythm. Normal S1/S2. No murmurs, rubs, or gallops  ABDOMEN: Soft, non-tender, non-distended; normal bowel sounds, no organomegaly  EXTREMITIES:  2+ peripheral pulses b/l, No clubbing, cyanosis, or edema  NEUROLOGY: A&O x 3, no focal deficits  SKIN: No rashes or lesions    ============================I/O===========================   I&O's Detail    21 Dec 2023 07:01  -  22 Dec 2023 07:00  --------------------------------------------------------  IN:    Heparin: 196 mL    Oral Fluid: 600 mL  Total IN: 796 mL    OUT:    Voided (mL): 1275 mL  Total OUT: 1275 mL    Total NET: -479 mL      22 Dec 2023 07:01  -  22 Dec 2023 20:05  --------------------------------------------------------  IN:    Heparin: 98 mL    Oral Fluid: 880 mL  Total IN: 978 mL    OUT:    Voided (mL): 500 mL  Total OUT: 500 mL    Total NET: 478 mL        ============================ LABS =========================                        12.1   7.  )-----------( 181      ( 21 Dec 2023 02:02 )             36.2         134<L>  |  104  |  32<H>  ----------------------------<  143<H>  4.3   |  19<L>  |  1.24    Ca    10.0      21 Dec 2023 02:02  Phos  3.9       Mg     1.9         TPro  6.9  /  Alb  3.9  /  TBili  0.2  /  DBili  x   /  AST  22  /  ALT  67<H>  /  AlkPhos  63                  LIVER FUNCTIONS - ( 21 Dec 2023 02:02 )  Alb: 3.9 g/dL / Pro: 6.9 g/dL / ALK PHOS: 63 U/L / ALT: 67 U/L / AST: 22 U/L / GGT: x           PT/INR - ( 22 Dec 2023 10:20 )   PT: 11.7 sec;   INR: 1.12 ratio         PTT - ( 22 Dec 2023 10:20 )  PTT:63.8 sec    Lactate, Blood: 0.9 mmol/L (23 @ 02:02)    Urinalysis Basic - ( 21 Dec 2023 02:02 )    Color: x / Appearance: x / SG: x / pH: x  Gluc: 143 mg/dL / Ketone: x  / Bili: x / Urobili: x   Blood: x / Protein: x / Nitrite: x   Leuk Esterase: x / RBC: x / WBC x   Sq Epi: x / Non Sq Epi: x / Bacteria: x      ======================Micro/Rad/Cardio=================  Telemtry: Reviewed   EKG: Reviewed  CXR: Reviewed  Culture: Reviewed   Echo:   Cath: Cardiac Cath Lab - Adult:   [facility]  Department of Cardiology  [address] [place] [address]  [phone]  Cath Lab Report -- Comprehensive Report  Patient: LD VALENCIA  Study date: 2017  Account number: [id number]  MR number: [id number]  : 1965  Gender: Male  Race: W  Case Physician(s):  Jairon Holguin M.D.  Referring Physician:  Luc Lynn M.D.  INDICATIONS: Unstable angina - CCS4.  HISTORY: The patient has a history of coronary artery disease. The patient  hashypertension and medication-treated dyslipidemia.  PROCEDURE:  --  Left heart catheterization with ventriculography.  --  Left coronary angiography.  --  Right coronary angiography.  TECHNIQUE: The risks and alternatives of the procedures and conscious  sedation were explained to the patient and informed consent was obtained.  Cardiac catheterization performed electively.  Local anesthetic given. Right radial artery access. A 6FR PRELUDE KIT was  inserted in the vessel. Left heart catheterization. Ventriculography was  performed. A 5FR FR4.0 EXPO catheter was utilized. Left coronary artery  angiography. The vessel was injected utilizing a 5FR FL3.5 EXPO catheter.  Right coronary artery angiography. The vessel was injected utilizing a 5FR  FR4.0 EXPO catheter. RADIATION EXPOSURE: 1.1 min.  CONTRAST GIVEN: Omnipaque 55 ml.  MEDICATIONS GIVEN: Midazolam, 1 mg, IV. Fentanyl, 25 mcg, IV. Verapamil  (Isoptin, Calan, Covera), 2.5 mg, IA. Heparin, 3000 units, IA.  VENTRICLES: Global left ventricular function was moderately depressed. EF  estimated was 40 %.  CORONARY VESSELS: The coronary circulation is right dominant.  LM:   --  LM: Normal.  LAD:   --  Proximal LAD: There was a 50 % stenosis.  CX:   --  Circumflex: Normal.  RCA:   --  Mid RCA: There was a 40 % stenosis.  --  Distal RCA: There was a 50 % stenosis.  COMPLICATIONS: There were no complications.  DIAGNOSTIC RECOMMENDATIONS: The patient should continue with the present  medications.  Prepared and signed by  Jairon Holguin M.D.  Signed 2017 12:20:13  HEMODYNAMIC TABLES  Pressures:  Baseline/ Room Air  Pressures:  - HR: 78  Pressures:  - Rhythm:  Pressures:  -- Aortic Pressure (S/D/M): --/--/99  Pressures:  -- Left Ventricle (s/edp): 157/39/--  Outputs:  Baseline/ Room Air  Outputs:  -- CALCULATIONS[age]  Outputs:  -- CALCULATIONS: Body Surface Area: 2.05  Outputs:  -- CALCULATIONS: Height in cm: 175.00  Outputs:  -- CALCULATIONS: Sex: Male  Outputs:  -- CALCULATIONS: Weight in k.40 (17 @ 21:55)    ======================================================  PAST MEDICAL & SURGICAL HISTORY:  HTN (hypertension)      CAD (coronary artery disease)  ; stent      Intracranial hemorrhage        Respiratory arrest        Myocardial infarction, unspecified MI type, unspecified artery      History of coronary artery stent placement  ====================ASSESSMENT ==============  58 male with HTN, CAD (s/p PCI ), HFrEF, CVA , and T2DM presenting with chest pressure and unknown tachycardia that was shocked x1, [facility]  found to have in-stent restenosis of pLAD and  of RCA, admitted to CICU for management of cardiogenic shock and ADHF requiring IABP  -, with hospital course c/b vfib arrest requiring reinsertion of IABP, currently listed for transplant status 2.    ====================== NEUROLOGY=====================  Anxiety  - No Seroquel/antipsychotics since vfib arrest and prolonged QTC  - Psych Eval, recommended SSRI, but pt. refused   - Psych recommending atarax PRN  - Continue to monitor mental status    PT/Conditioning  - Continue band exercises while on bedrest s/t IABP    ==================== RESPIRATORY======================  Acute Hypoxemic Respiratory Failure  - s/p x2 intubations for cardiogenic pulm edema and the in setting of cardiac arrest, resolved - extubated 11/10  - Currently maintaining >95% sats on room air  - Continue incentive spirometry and monitoring of sp02    Asthma  - c/w symbicort  - On trelegy at home  - Continue to monitor SpO2 with goal >94%    ====================CARDIOVASCULAR==================  Vfib arrest i/s/o ischemia  - Lido gtt off   - PO Amio load - total of 5g per EP complete   - Amio dc'd  for rising LFTs  - Keep K > 4, Mag > 2.2     Cardiogenic shock requiring IABP (- , -)  - Likely 2/2 NSTEMI and ADHF  -  LHC: pLAD 100 % in-stent restenosis & mRCA, 100 %. PCWP 30. IABP placed.  -  TTE: LV dilated. EF 32 %. Regional WMAs present, mod (grade 2) LV diastolic dysfunction  -  TTE: EF 22% and + LV thrombus  - appears euvolemic  - IABP swapped to LFA , continue 1:1 support  - hydralazine 50 TID and ISD 30 TID for AL reduction  - c/w coreg 25 BID for GDMT  - [place] d/c'd due to elevated K levels  - UNOS status 2 as of     NSTEMI iso stent re-occlusion of pLAD and 100%  of RCA  - EKG on admission w/ LBBB  - DAPT: c/w ASA, Brilinta d/c'd per transplant w/u  - c/w lipitor 80  - cMR deferred given necessity of IABP  - CT sx not recommending CABG, undergoing AT eval    LV thrombus  - c/w heparin gtt w/ therapeutic PTT    ===================== RENAL =========================  Non-oliguric ARIC, resolved  - Cr .  - Baseline Cr: -  - Renal US: no evidence of renal artery stenosis  - Trend BMP, lytes daily, replace as needed  - Continue Strict I/Os, avoid nephrotoxins    =============== GASTROINTESTINAL===================  Constipation/ileus, resolved  - regular diet  - bowel reg prn    ===================ENDO====================  Type 2 DM  - A1c 8.3, glucose controlled  - Continue lantus, premeal, low ISS  - continue FS monitoring    ===================HEMATOLOGIC/ONC ===================  - H/H & plts stable, no s/sx of bleeding  - Monitor H/H and plts q48h  - VTE PPX: heparin gtt    ==================INFECTIOUS DISEASE================  Positive blood culture, resolved  - Repeat BCx drawn  for leukocytosis to 12k - positive on , G+ rods in anaerobic bottle, suspect contaminant  - Repeat cultures x2 sent  per transplant ID recs - no growth to date  - Vancomycin by trough (-)  - Final microbial ID: Cutibacterium acnes, per ID this is likely to be a skin contaminant, vanc dc'd.   - Dental eval  to rule out infectious etiology that would have led to bacteremia, no significant findings on exam.     Enterococcus faecalis bacteremia, resolved  - BCx + for enterococcus faecalis x2, Staph epi x1 (likely contaminant)- pan sensitive   - Urine cx  + enterococcus faecalis  - BCx  no growth   - IABP site swapped to RFA   - s/p Vancomycin 1g q12h (-)  - CT A/P negative for infectious pathology    COVID, resolved  - Off airborne precautions     Pre-transplant ID w/u   - Trend ID recs for serologies   - Colonoscopy  - normal   - Chest CT  - improved LLL aeration  - s/p immunizations    ==================DERMATOLOGY================   Upper Extremity Rash  - Patient developed bilateral upper extremity rash after receiving Hepatitis A & B immunizations on   - Dermatology consulted  - not likely to be related to vanco  - Recommend clobetasol 0.05% BID to affected areas   - RVP repeated to rule out viral etiology, negative    Left neck mass  - US soft tissue:  lateral left neck 2.0 x 0.7 x 1.4 cm heterogeneous complex solid and cystic appearing lesion in subcutaneous soft tissue extending into subjacent strap muscle  - vasc cardiology consult: JOSE J venous duplex US ordered,  - Subcentimeter lymph nodes are present in the left neck and axilla, No evidence of left upper extremity DVT, Superficial thrombosis of the cephalic vein in the left upper arm.  - continue to monitor site    Lines: RFA IABP (-), RRA (-), LFA IABP (-    Patient requires continuous monitoring with bedside rhythm monitoring, pulse ox monitoring, and intermittent blood gas analysis. Care plan discussed with ICU care team. Patient remained critical and at risk for life threatening decompensation.  Patient seen, examined and plan discussed with CCU team during rounds.     I have personally provided __30__ minutes of critical care time excluding time spent on separate procedures, in addition to initial critical care time provided by the CICU Attending, Dr. Durand.    By signing my name below, I, Kalyn Sousa, attest that this documentation has been prepared under the direction and in the presence of Kirsty Davis NP.   Electronically signed: Rosalba Booth, 23 @ 20:05    I, Kirsty Davis  , personally performed the services described in this documentation. all medical record entries made by the scribe were at my direction and in my presence. I have reviewed the chart and agree that the record reflects my personal performance and is accurate and complete  Electronically signed: Kirsty Davis NP.        DEVORAH VALENCIA  MRN-[id number]  Patient is a 58y old  Male who presents with a chief complaint of advanced cardiac therapies evaluation (22 Dec 2023 19:41)    HPI:  pt seen and approx 1:30 pm  in [facility]    59yo M w/ hx HTN, CAD w/ 1 stent in , ICH () presenting with abn ekg. Patient presented to [facility] where he was found to have STEMI, recommended to get cath however patient did not want to get it there so it left and came here.  Patient initially had cough, congestion, fever, was placed on antibiotics on .  Started feeling nauseous and had a presyncopal event after which he presented to ED last night.  Had chest pain as well.  Chest pain is midsternal.  Not currently having chest pain.  Received 4 aspirin 30 min pta. (2023 15:11)      Hospital Course:    24 HOUR EVENTS:    REVIEW OF SYSTEMS:    CONSTITUTIONAL: No weakness, fevers or chills  EYES/ENT: No visual changes;  No vertigo or throat pain   NECK: No pain or stiffness  RESPIRATORY: No cough, wheezing, hemoptysis; No shortness of breath  CARDIOVASCULAR: No chest pain or palpitations  GASTROINTESTINAL: No abdominal or epigastric pain. No nausea, vomiting, or hematemesis; No diarrhea or constipation. No melena or hematochezia.  GENITOURINARY: No dysuria, frequency or hematuria  NEUROLOGICAL: No numbness or weakness  SKIN: No itching, rashes      ICU Vital Signs Last 24 Hrs  T(C): 36.8 (22 Dec 2023 19:00), Max: 36.9 (22 Dec 2023 00:00)  T(F): 98.2 (22 Dec 2023 19:00), Max: 98.4 (22 Dec 2023 00:00)  HR: 77 (22 Dec 2023 20:00) (71 - 87)  BP: --  BP(mean): --  ABP: --  ABP(mean): --  RR: 29 (22 Dec 2023 20:00) (16 - 29)  SpO2: 95% (22 Dec 2023 18:00) (95% - 100%)    O2 Parameters below as of 22 Dec 2023 19:00  Patient On (Oxygen Delivery Method): room air            CVP(mm Hg): --  CO: --  CI: --  PA: --  PA(mean): --  PA(direct): --  PCWP: --  LA: --  RA: --  SVR: --  SVRI: --  PVR: --  PVRI: --  I&O's Summary    21 Dec 2023 07:01  -  22 Dec 2023 07:00  --------------------------------------------------------  IN: 796 mL / OUT: 1275 mL / NET: -479 mL    22 Dec 2023 07:01  -  22 Dec 2023 20:05  --------------------------------------------------------  IN: 978 mL / OUT: 500 mL / NET: 478 mL        CAPILLARY BLOOD GLUCOSE    CAPILLARY BLOOD GLUCOSE      POCT Blood Glucose.: 229 mg/dL (22 Dec 2023 12:10)      PHYSICAL EXAM:  GENERAL: No acute distress, well-developed  HEAD:  Atraumatic, Normocephalic  EYES: EOMI, PERRLA, conjunctiva and sclera clear  NECK: Supple, no lymphadenopathy, no JVD  CHEST/LUNG: CTAB; No wheezes, rales, or rhonchi  HEART: Regular rate and rhythm. Normal S1/S2. No murmurs, rubs, or gallops  ABDOMEN: Soft, non-tender, non-distended; normal bowel sounds, no organomegaly  EXTREMITIES:  2+ peripheral pulses b/l, No clubbing, cyanosis, or edema  NEUROLOGY: A&O x 3, no focal deficits  SKIN: No rashes or lesions    ============================I/O===========================   I&O's Detail    21 Dec 2023 07:01  -  22 Dec 2023 07:00  --------------------------------------------------------  IN:    Heparin: 196 mL    Oral Fluid: 600 mL  Total IN: 796 mL    OUT:    Voided (mL): 1275 mL  Total OUT: 1275 mL    Total NET: -479 mL      22 Dec 2023 07:01  -  22 Dec 2023 20:05  --------------------------------------------------------  IN:    Heparin: 98 mL    Oral Fluid: 880 mL  Total IN: 978 mL    OUT:    Voided (mL): 500 mL  Total OUT: 500 mL    Total NET: 478 mL        ============================ LABS =========================                        12.1   7.  )-----------( 181      ( 21 Dec 2023 02:02 )             36.2         134<L>  |  104  |  32<H>  ----------------------------<  143<H>  4.3   |  19<L>  |  1.24    Ca    10.0      21 Dec 2023 02:02  Phos  3.9       Mg     1.9         TPro  6.9  /  Alb  3.9  /  TBili  0.2  /  DBili  x   /  AST  22  /  ALT  67<H>  /  AlkPhos  63                  LIVER FUNCTIONS - ( 21 Dec 2023 02:02 )  Alb: 3.9 g/dL / Pro: 6.9 g/dL / ALK PHOS: 63 U/L / ALT: 67 U/L / AST: 22 U/L / GGT: x           PT/INR - ( 22 Dec 2023 10:20 )   PT: 11.7 sec;   INR: 1.12 ratio         PTT - ( 22 Dec 2023 10:20 )  PTT:63.8 sec    Lactate, Blood: 0.9 mmol/L (23 @ 02:02)    Urinalysis Basic - ( 21 Dec 2023 02:02 )    Color: x / Appearance: x / SG: x / pH: x  Gluc: 143 mg/dL / Ketone: x  / Bili: x / Urobili: x   Blood: x / Protein: x / Nitrite: x   Leuk Esterase: x / RBC: x / WBC x   Sq Epi: x / Non Sq Epi: x / Bacteria: x      ======================Micro/Rad/Cardio=================  Telemtry: Reviewed   EKG: Reviewed  CXR: Reviewed  Culture: Reviewed   Echo:   Cath: Cardiac Cath Lab - Adult:   [facility]  Department of Cardiology  [address] [place] [address]  [phone]  Cath Lab Report -- Comprehensive Report  Patient: LD VALENCIA  Study date: 2017  Account number: [id number]  MR number: [id number]  : 1965  Gender: Male  Race: W  Case Physician(s):  Jairon Holguin M.D.  Referring Physician:  Luc Lynn M.D.  INDICATIONS: Unstable angina - CCS4.  HISTORY: The patient has a history of coronary artery disease. The patient  hashypertension and medication-treated dyslipidemia.  PROCEDURE:  --  Left heart catheterization with ventriculography.  --  Left coronary angiography.  --  Right coronary angiography.  TECHNIQUE: The risks and alternatives of the procedures and conscious  sedation were explained to the patient and informed consent was obtained.  Cardiac catheterization performed electively.  Local anesthetic given. Right radial artery access. A 6FR PRELUDE KIT was  inserted in the vessel. Left heart catheterization. Ventriculography was  performed. A 5FR FR4.0 EXPO catheter was utilized. Left coronary artery  angiography. The vessel was injected utilizing a 5FR FL3.5 EXPO catheter.  Right coronary artery angiography. The vessel was injected utilizing a 5FR  FR4.0 EXPO catheter. RADIATION EXPOSURE: 1.1 min.  CONTRAST GIVEN: Omnipaque 55 ml.  MEDICATIONS GIVEN: Midazolam, 1 mg, IV. Fentanyl, 25 mcg, IV. Verapamil  (Isoptin, Calan, Covera), 2.5 mg, IA. Heparin, 3000 units, IA.  VENTRICLES: Global left ventricular function was moderately depressed. EF  estimated was 40 %.  CORONARY VESSELS: The coronary circulation is right dominant.  LM:   --  LM: Normal.  LAD:   --  Proximal LAD: There was a 50 % stenosis.  CX:   --  Circumflex: Normal.  RCA:   --  Mid RCA: There was a 40 % stenosis.  --  Distal RCA: There was a 50 % stenosis.  COMPLICATIONS: There were no complications.  DIAGNOSTIC RECOMMENDATIONS: The patient should continue with the present  medications.  Prepared and signed by  Jairon Holguin M.D.  Signed 2017 12:20:13  HEMODYNAMIC TABLES  Pressures:  Baseline/ Room Air  Pressures:  - HR: 78  Pressures:  - Rhythm:  Pressures:  -- Aortic Pressure (S/D/M): --/--/99  Pressures:  -- Left Ventricle (s/edp): 157/39/--  Outputs:  Baseline/ Room Air  Outputs:  -- CALCULATIONS[age]  Outputs:  -- CALCULATIONS: Body Surface Area: 2.05  Outputs:  -- CALCULATIONS: Height in cm: 175.00  Outputs:  -- CALCULATIONS: Sex: Male  Outputs:  -- CALCULATIONS: Weight in k.40 (17 @ 21:55)    ======================================================  PAST MEDICAL & SURGICAL HISTORY:  HTN (hypertension)      CAD (coronary artery disease)  ; stent      Intracranial hemorrhage        Respiratory arrest        Myocardial infarction, unspecified MI type, unspecified artery      History of coronary artery stent placement  ====================ASSESSMENT ==============  58 male with HTN, CAD (s/p PCI ), HFrEF, CVA , and T2DM presenting with chest pressure and unknown tachycardia that was shocked x1, [facility]  found to have in-stent restenosis of pLAD and  of RCA, admitted to CICU for management of cardiogenic shock and ADHF requiring IABP  -, with hospital course c/b vfib arrest requiring reinsertion of IABP, currently listed for transplant status 2.    ====================== NEUROLOGY=====================  Anxiety  - No Seroquel/antipsychotics since vfib arrest and prolonged QTC  - Psych Eval, recommended SSRI, but pt. refused   - Psych recommending atarax PRN  - Continue to monitor mental status    PT/Conditioning  - Continue band exercises while on bedrest s/t IABP    ==================== RESPIRATORY======================  Acute Hypoxemic Respiratory Failure  - s/p x2 intubations for cardiogenic pulm edema and the in setting of cardiac arrest, resolved - extubated 11/10  - Currently maintaining >95% sats on room air  - Continue incentive spirometry and monitoring of sp02    Asthma  - c/w symbicort  - On trelegy at home  - Continue to monitor SpO2 with goal >94%    ====================CARDIOVASCULAR==================  Vfib arrest i/s/o ischemia  - Lido gtt off   - PO Amio load - total of 5g per EP complete   - Amio dc'd  for rising LFTs  - Keep K > 4, Mag > 2.2     Cardiogenic shock requiring IABP (- , -)  - Likely 2/2 NSTEMI and ADHF  -  LHC: pLAD 100 % in-stent restenosis & mRCA, 100 %. PCWP 30. IABP placed.  -  TTE: LV dilated. EF 32 %. Regional WMAs present, mod (grade 2) LV diastolic dysfunction  -  TTE: EF 22% and + LV thrombus  - appears euvolemic  - IABP swapped to LFA , continue 1:1 support  - hydralazine 50 TID and ISD 30 TID for AL reduction  - c/w coreg 25 BID for GDMT  - Wilmot d/c'd due to elevated K levels  - UNOS status 2 as of     NSTEMI iso stent re-occlusion of pLAD and 100%  of RCA  - EKG on admission w/ LBBB  - DAPT: c/w ASA, Brilinta d/c'd per transplant w/u  - c/w lipitor 80  - cMR deferred given necessity of IABP  - CT sx not recommending CABG, undergoing AT eval    LV thrombus  - c/w heparin gtt w/ therapeutic PTT    ===================== RENAL =========================  Non-oliguric ARIC, resolved  - Cr .  - Baseline Cr: -  - Renal US: no evidence of renal artery stenosis  - Trend BMP, lytes daily, replace as needed  - Continue Strict I/Os, avoid nephrotoxins    =============== GASTROINTESTINAL===================  Constipation/ileus, resolved  - regular diet  - bowel reg prn    ===================ENDO====================  Type 2 DM  - A1c 8.3, glucose controlled  - Continue lantus, premeal, low ISS  - continue FS monitoring    ===================HEMATOLOGIC/ONC ===================  - H/H & plts stable, no s/sx of bleeding  - Monitor H/H and plts q48h  - VTE PPX: heparin gtt    ==================INFECTIOUS DISEASE================  Positive blood culture, resolved  - Repeat BCx drawn  for leukocytosis to 12k - positive on , G+ rods in anaerobic bottle, suspect contaminant  - Repeat cultures x2 sent  per transplant ID recs - no growth to date  - Vancomycin by trough (-)  - Final microbial ID: Cutibacterium acnes, per ID this is likely to be a skin contaminant, vanc dc'd.   - Dental eval  to rule out infectious etiology that would have led to bacteremia, no significant findings on exam.     Enterococcus faecalis bacteremia, resolved  - BCx + for enterococcus faecalis x2, Staph epi x1 (likely contaminant)- pan sensitive   - Urine cx  + enterococcus faecalis  - BCx  no growth   - IABP site swapped to RFA   - s/p Vancomycin 1g q12h (-)  - CT A/P negative for infectious pathology    COVID, resolved  - Off airborne precautions     Pre-transplant ID w/u   - Trend ID recs for serologies   - Colonoscopy  - normal   - Chest CT  - improved LLL aeration  - s/p immunizations    ==================DERMATOLOGY================   Upper Extremity Rash  - Patient developed bilateral upper extremity rash after receiving Hepatitis A & B immunizations on   - Dermatology consulted  - not likely to be related to vanco  - Recommend clobetasol 0.05% BID to affected areas   - RVP repeated to rule out viral etiology, negative    Left neck mass  - US soft tissue:  lateral left neck 2.0 x 0.7 x 1.4 cm heterogeneous complex solid and cystic appearing lesion in subcutaneous soft tissue extending into subjacent strap muscle  - vasc cardiology consult: JOSE J venous duplex US ordered,  - Subcentimeter lymph nodes are present in the left neck and axilla, No evidence of left upper extremity DVT, Superficial thrombosis of the cephalic vein in the left upper arm.  - continue to monitor site    Lines: RFA IABP (-), RRA (-), LFA IABP (-    Patient requires continuous monitoring with bedside rhythm monitoring, pulse ox monitoring, and intermittent blood gas analysis. Care plan discussed with ICU care team. Patient remained critical and at risk for life threatening decompensation.  Patient seen, examined and plan discussed with CCU team during rounds.     I have personally provided __30__ minutes of critical care time excluding time spent on separate procedures, in addition to initial critical care time provided by the CICU Attending, Dr. Durand.    By signing my name below, I, Kalyn Sousa, attest that this documentation has been prepared under the direction and in the presence of Kirsty Davis NP.   Electronically signed: Rosalba Booth, 23 @ 20:05    I, Kirsty Davis  , personally performed the services described in this documentation. all medical record entries made by the scribe were at my direction and in my presence. I have reviewed the chart and agree that the record reflects my personal performance and is accurate and complete  Electronically signed: Kirsty Davis NP.

## 2023-12-22 NOTE — PROGRESS NOTE ADULT - SUBJECTIVE AND OBJECTIVE BOX
INFECTIOUS DISEASES FOLLOW UP-- Courtney Peters  [phone]    This is a follow up note for this  58yMale with  Non-ST elevation myocardial infarction (NSTEMI)        ROS:  CONSTITUTIONAL:  No fever, good appetite  CARDIOVASCULAR:  No chest pain or palpitations  RESPIRATORY:  No dyspnea  GASTROINTESTINAL:  No nausea, vomiting, diarrhea, or abdominal pain  GENITOURINARY:  No dysuria  NEUROLOGIC:  No headache,     Allergies    penicillins (Unknown)    Intolerances        ANTIBIOTICS/RELEVANT:  antimicrobials    immunologic:  hepatitis B Vaccine - Adult (HEPLISAV-B) 20 MICROGram(s) IntraMuscular once    OTHER:  aspirin  chewable 81 milliGRAM(s) Oral daily  atorvastatin 80 milliGRAM(s) Oral at bedtime  bisacodyl 5 milliGRAM(s) Oral every 12 hours  budesonide  80 MICROgram(s)/formoterol 4.5 MICROgram(s) Inhaler 2 Puff(s) Inhalation two times a day  carvedilol 25 milliGRAM(s) Oral every 12 hours  chlorhexidine 2% Cloths 1 Application(s) Topical daily  heparin  Infusion 1300 Unit(s)/Hr IV Continuous <Continuous>  hydrALAZINE 50 milliGRAM(s) Oral every 8 hours  hydrOXYzine hydrochloride 25 milliGRAM(s) Oral two times a day PRN  insulin glargine Injectable (LANTUS) 12 Unit(s) SubCutaneous at bedtime  insulin lispro (ADMELOG) corrective regimen sliding scale   SubCutaneous three times a day before meals  insulin lispro (ADMELOG) corrective regimen sliding scale   SubCutaneous at bedtime  insulin lispro Injectable (ADMELOG) 9 Unit(s) SubCutaneous three times a day before meals  isosorbide   dinitrate Tablet (ISORDIL) 30 milliGRAM(s) Oral three times a day  polyethylene glycol 3350 17 Gram(s) Oral two times a day  senna 2 Tablet(s) Oral at bedtime      Objective:  Vital Signs Last 24 Hrs  T(C): 36.7 (22 Dec 2023 11:00), Max: 37 (21 Dec 2023 20:00)  T(F): 98.1 (22 Dec 2023 11:00), Max: 98.6 (21 Dec 2023 20:00)  HR: 78 (22 Dec 2023 19:00) (71 - 87)  BP: --  BP(mean): --  RR: 22 (22 Dec 2023 19:00) (16 - 23)  SpO2: 95% (22 Dec 2023 18:00) (95% - 100%)    Parameters below as of 22 Dec 2023 19:00  Patient On (Oxygen Delivery Method): room air        PHYSICAL EXAM:  Constitutional:no acute distress  Eyes:MARVEL, EOMI  Ear/Nose/Throat: no oral lesions, 	  Respiratory: clear BL  Cardiovascular: S1S2  Gastrointestinal:soft, (+) BS, no tenderness  Extremities:no e/e/c  No Lymphadenopathy  IV sites not inflammed.    LABS:                        12.1   7.21  )-----------( 181      ( 21 Dec 2023 02:02 )             36.2     12-21    134<L>  |  104  |  32<H>  ----------------------------<  143<H>  4.3   |  19<L>  |  1.24    Ca    10.0      21 Dec 2023 02:02  Phos  3.9     12-21  Mg     1.9     12-21    TPro  6.9  /  Alb  3.9  /  TBili  0.2  /  DBili  x   /  AST  22  /  ALT  67<H>  /  AlkPhos  63  12-21    PT/INR - ( 22 Dec 2023 10:20 )   PT: 11.7 sec;   INR: 1.12 ratio         PTT - ( 22 Dec 2023 10:20 )  PTT:63.8 sec  Urinalysis Basic - ( 21 Dec 2023 02:02 )    Color: x / Appearance: x / SG: x / pH: x  Gluc: 143 mg/dL / Ketone: x  / Bili: x / Urobili: x   Blood: x / Protein: x / Nitrite: x   Leuk Esterase: x / RBC: x / WBC x   Sq Epi: x / Non Sq Epi: x / Bacteria: x        MICROBIOLOGY:            RECENT CULTURES:      RADIOLOGY & ADDITIONAL STUDIES:  < from: Xray Chest 1 View- PORTABLE-Routine (Xray Chest 1 View- PORTABLE-Routine .) (12.21.23 @ 04:51) >  IMPRESSION:  Clear lungs.  Lines and tubes as described.    < end of copied text >   INFECTIOUS DISEASES FOLLOW UP-- Courtney Peters  [phone]    This is a follow up note for this  58yMale with  Non-ST elevation myocardial infarction (NSTEMI)        ROS:  CONSTITUTIONAL:  No fever, good appetite  CARDIOVASCULAR:  No chest pain or palpitations  RESPIRATORY:  No dyspnea  GASTROINTESTINAL:  No nausea, vomiting, diarrhea, or abdominal pain  GENITOURINARY:  No dysuria  NEUROLOGIC:  No headache,     Allergies    penicillins (Unknown)    Intolerances        ANTIBIOTICS/RELEVANT:  antimicrobials    immunologic:  hepatitis B Vaccine - Adult (HEPLISAV-B) 20 MICROGram(s) IntraMuscular once    OTHER:  aspirin  chewable 81 milliGRAM(s) Oral daily  atorvastatin 80 milliGRAM(s) Oral at bedtime  bisacodyl 5 milliGRAM(s) Oral every 12 hours  budesonide  80 MICROgram(s)/formoterol 4.5 MICROgram(s) Inhaler 2 Puff(s) Inhalation two times a day  carvedilol 25 milliGRAM(s) Oral every 12 hours  chlorhexidine 2% Cloths 1 Application(s) Topical daily  heparin  Infusion 1300 Unit(s)/Hr IV Continuous <Continuous>  hydrALAZINE 50 milliGRAM(s) Oral every 8 hours  hydrOXYzine hydrochloride 25 milliGRAM(s) Oral two times a day PRN  insulin glargine Injectable (LANTUS) 12 Unit(s) SubCutaneous at bedtime  insulin lispro (ADMELOG) corrective regimen sliding scale   SubCutaneous three times a day before meals  insulin lispro (ADMELOG) corrective regimen sliding scale   SubCutaneous at bedtime  insulin lispro Injectable (ADMELOG) 9 Unit(s) SubCutaneous three times a day before meals  isosorbide   dinitrate Tablet (ISORDIL) 30 milliGRAM(s) Oral three times a day  polyethylene glycol 3350 17 Gram(s) Oral two times a day  senna 2 Tablet(s) Oral at bedtime      Objective:  Vital Signs Last 24 Hrs  T(C): 36.7 (22 Dec 2023 11:00), Max: 37 (21 Dec 2023 20:00)  T(F): 98.1 (22 Dec 2023 11:00), Max: 98.6 (21 Dec 2023 20:00)  HR: 78 (22 Dec 2023 19:00) (71 - 87)  BP: --  BP(mean): --  RR: 22 (22 Dec 2023 19:00) (16 - 23)  SpO2: 95% (22 Dec 2023 18:00) (95% - 100%)    Parameters below as of 22 Dec 2023 19:00  Patient On (Oxygen Delivery Method): room air        PHYSICAL EXAM:  Constitutional:no acute distress  Eyes:MARVEL, EOMI  Ear/Nose/Throat: no oral lesions, 	  Respiratory: clear BL  Cardiovascular: S1S2  Gastrointestinal:soft, (+) BS, no tenderness  Extremities:no e/e/c  No Lymphadenopathy  IV sites not inflammed.    LABS:                        12.1   7.21  )-----------( 181      ( 21 Dec 2023 02:02 )             36.2     12-21    134<L>  |  104  |  32<H>  ----------------------------<  143<H>  4.3   |  19<L>  |  1.24    Ca    10.0      21 Dec 2023 02:02  Phos  3.9     12-21  Mg     1.9     12-21    TPro  6.9  /  Alb  3.9  /  TBili  0.2  /  DBili  x   /  AST  22  /  ALT  67<H>  /  AlkPhos  63  12-21    PT/INR - ( 22 Dec 2023 10:20 )   PT: 11.7 sec;   INR: 1.12 ratio         PTT - ( 22 Dec 2023 10:20 )  PTT:63.8 sec  Urinalysis Basic - ( 21 Dec 2023 02:02 )    Color: x / Appearance: x / SG: x / pH: x  Gluc: 143 mg/dL / Ketone: x  / Bili: x / Urobili: x   Blood: x / Protein: x / Nitrite: x   Leuk Esterase: x / RBC: x / WBC x   Sq Epi: x / Non Sq Epi: x / Bacteria: x        MICROBIOLOGY:            RECENT CULTURES:      RADIOLOGY & ADDITIONAL STUDIES:  < from: Xray Chest 1 View- PORTABLE-Routine (Xray Chest 1 View- PORTABLE-Routine .) (12.21.23 @ 04:51) >  IMPRESSION:  Clear lungs.  Lines and tubes as described.    < end of copied text >   INFECTIOUS DISEASES FOLLOW UP-- Courtney Peters  [phone]    This is a follow up note for this  58yMale with  Non-ST elevation myocardial infarction (NSTEMI)        ROS:  CONSTITUTIONAL:  No fever, good appetite  CARDIOVASCULAR:  No chest pain or palpitations  RESPIRATORY:  No dyspnea  GASTROINTESTINAL:  No nausea, vomiting, diarrhea, or abdominal pain  GENITOURINARY:  No dysuria  NEUROLOGIC:  No headache,     Allergies    penicillins (Unknown)    Intolerances        ANTIBIOTICS/RELEVANT:  antimicrobials    immunologic:  hepatitis B Vaccine - Adult (HEPLISAV-B) 20 MICROGram(s) IntraMuscular once    OTHER:  aspirin  chewable 81 milliGRAM(s) Oral daily  atorvastatin 80 milliGRAM(s) Oral at bedtime  bisacodyl 5 milliGRAM(s) Oral every 12 hours  budesonide  80 MICROgram(s)/formoterol 4.5 MICROgram(s) Inhaler 2 Puff(s) Inhalation two times a day  carvedilol 25 milliGRAM(s) Oral every 12 hours  chlorhexidine 2% Cloths 1 Application(s) Topical daily  heparin  Infusion 1300 Unit(s)/Hr IV Continuous <Continuous>  hydrALAZINE 50 milliGRAM(s) Oral every 8 hours  hydrOXYzine hydrochloride 25 milliGRAM(s) Oral two times a day PRN  insulin glargine Injectable (LANTUS) 12 Unit(s) SubCutaneous at bedtime  insulin lispro (ADMELOG) corrective regimen sliding scale   SubCutaneous three times a day before meals  insulin lispro (ADMELOG) corrective regimen sliding scale   SubCutaneous at bedtime  insulin lispro Injectable (ADMELOG) 9 Unit(s) SubCutaneous three times a day before meals  isosorbide   dinitrate Tablet (ISORDIL) 30 milliGRAM(s) Oral three times a day  polyethylene glycol 3350 17 Gram(s) Oral two times a day  senna 2 Tablet(s) Oral at bedtime      Objective:  Vital Signs Last 24 Hrs  T(C): 36.7 (22 Dec 2023 11:00), Max: 37 (21 Dec 2023 20:00)  T(F): 98.1 (22 Dec 2023 11:00), Max: 98.6 (21 Dec 2023 20:00)  HR: 78 (22 Dec 2023 19:00) (71 - 87)  BP: --  BP(mean): --  RR: 22 (22 Dec 2023 19:00) (16 - 23)  SpO2: 95% (22 Dec 2023 18:00) (95% - 100%)    Parameters below as of 22 Dec 2023 19:00  Patient On (Oxygen Delivery Method): room air        PHYSICAL EXAM:  Constitutional:no acute distress  Eyes:MARVEL, EOMI  Ear/Nose/Throat: no oral lesions, 	  Respiratory: clear BL  Cardiovascular: S1S2  Gastrointestinal:soft, (+) BS, no tenderness  Extremities:no e/e/c  left IABP  No Lymphadenopathy  IV sites not inflammed.    LABS:                        12.1   7.21  )-----------( 181      ( 21 Dec 2023 02:02 )             36.2     12-21    134<L>  |  104  |  32<H>  ----------------------------<  143<H>  4.3   |  19<L>  |  1.24    Ca    10.0      21 Dec 2023 02:02  Phos  3.9     12-21  Mg     1.9     12-21    TPro  6.9  /  Alb  3.9  /  TBili  0.2  /  DBili  x   /  AST  22  /  ALT  67<H>  /  AlkPhos  63  12-21    PT/INR - ( 22 Dec 2023 10:20 )   PT: 11.7 sec;   INR: 1.12 ratio         PTT - ( 22 Dec 2023 10:20 )  PTT:63.8 sec  Urinalysis Basic - ( 21 Dec 2023 02:02 )    Color: x / Appearance: x / SG: x / pH: x  Gluc: 143 mg/dL / Ketone: x  / Bili: x / Urobili: x   Blood: x / Protein: x / Nitrite: x   Leuk Esterase: x / RBC: x / WBC x   Sq Epi: x / Non Sq Epi: x / Bacteria: x        MICROBIOLOGY:            RECENT CULTURES:      RADIOLOGY & ADDITIONAL STUDIES:  < from: Xray Chest 1 View- PORTABLE-Routine (Xray Chest 1 View- PORTABLE-Routine .) (12.21.23 @ 04:51) >  IMPRESSION:  Clear lungs.  Lines and tubes as described.    < end of copied text >   INFECTIOUS DISEASES FOLLOW UP-- Courtney Peters  [phone]    This is a follow up note for this  58yMale with  Non-ST elevation myocardial infarction (NSTEMI)        ROS:  CONSTITUTIONAL:  No fever, good appetite  CARDIOVASCULAR:  No chest pain or palpitations  RESPIRATORY:  No dyspnea  GASTROINTESTINAL:  No nausea, vomiting, diarrhea, or abdominal pain  GENITOURINARY:  No dysuria  NEUROLOGIC:  No headache,     Allergies    penicillins (Unknown)    Intolerances        ANTIBIOTICS/RELEVANT:  antimicrobials    immunologic:  hepatitis B Vaccine - Adult (HEPLISAV-B) 20 MICROGram(s) IntraMuscular once    OTHER:  aspirin  chewable 81 milliGRAM(s) Oral daily  atorvastatin 80 milliGRAM(s) Oral at bedtime  bisacodyl 5 milliGRAM(s) Oral every 12 hours  budesonide  80 MICROgram(s)/formoterol 4.5 MICROgram(s) Inhaler 2 Puff(s) Inhalation two times a day  carvedilol 25 milliGRAM(s) Oral every 12 hours  chlorhexidine 2% Cloths 1 Application(s) Topical daily  heparin  Infusion 1300 Unit(s)/Hr IV Continuous <Continuous>  hydrALAZINE 50 milliGRAM(s) Oral every 8 hours  hydrOXYzine hydrochloride 25 milliGRAM(s) Oral two times a day PRN  insulin glargine Injectable (LANTUS) 12 Unit(s) SubCutaneous at bedtime  insulin lispro (ADMELOG) corrective regimen sliding scale   SubCutaneous three times a day before meals  insulin lispro (ADMELOG) corrective regimen sliding scale   SubCutaneous at bedtime  insulin lispro Injectable (ADMELOG) 9 Unit(s) SubCutaneous three times a day before meals  isosorbide   dinitrate Tablet (ISORDIL) 30 milliGRAM(s) Oral three times a day  polyethylene glycol 3350 17 Gram(s) Oral two times a day  senna 2 Tablet(s) Oral at bedtime      Objective:  Vital Signs Last 24 Hrs  T(C): 36.7 (22 Dec 2023 11:00), Max: 37 (21 Dec 2023 20:00)  T(F): 98.1 (22 Dec 2023 11:00), Max: 98.6 (21 Dec 2023 20:00)  HR: 78 (22 Dec 2023 19:00) (71 - 87)  BP: --  BP(mean): --  RR: 22 (22 Dec 2023 19:00) (16 - 23)  SpO2: 95% (22 Dec 2023 18:00) (95% - 100%)    Parameters below as of 22 Dec 2023 19:00  Patient On (Oxygen Delivery Method): room air        PHYSICAL EXAM:  Constitutional:no acute distress  Eyes:MARVEL, EOMI  Ear/Nose/Throat: no oral lesions, 	  Respiratory: clear BL  Cardiovascular: S1S2  Gastrointestinal:soft, (+) BS, no tenderness  Extremities:no e/e/c  left IABP  No Lymphadenopathy  IV sites not inflammed.    LABS:                        12.1   7.21  )-----------( 181      ( 21 Dec 2023 02:02 )             36.2     12-21    134<L>  |  104  |  32<H>  ----------------------------<  143<H>  4.3   |  19<L>  |  1.24    Ca    10.0      21 Dec 2023 02:02  Phos  3.9     12-21  Mg     1.9     12-21    TPro  6.9  /  Alb  3.9  /  TBili  0.2  /  DBili  x   /  AST  22  /  ALT  67<H>  /  AlkPhos  63  12-21    PT/INR - ( 22 Dec 2023 10:20 )   PT: 11.7 sec;   INR: 1.12 ratio         PTT - ( 22 Dec 2023 10:20 )  PTT:63.8 sec  Urinalysis Basic - ( 21 Dec 2023 02:02 )    Color: x / Appearance: x / SG: x / pH: x  Gluc: 143 mg/dL / Ketone: x  / Bili: x / Urobili: x   Blood: x / Protein: x / Nitrite: x   Leuk Esterase: x / RBC: x / WBC x   Sq Epi: x / Non Sq Epi: x / Bacteria: x        MICROBIOLOGY:            RECENT CULTURES:      RADIOLOGY & ADDITIONAL STUDIES:  < from: Xray Chest 1 View- PORTABLE-Routine (Xray Chest 1 View- PORTABLE-Routine .) (12.21.23 @ 04:51) >  IMPRESSION:  Clear lungs.  Lines and tubes as described.    < end of copied text >

## 2023-12-22 NOTE — PROGRESS NOTE ADULT - ASSESSMENT
====================ASSESSMENT ==============  58 male with HTN, CAD (s/p PCI 2008), HFrEF, CVA 2018, and T2DM presenting with chest pressure and unknown tachycardia that was shocked x1, [facility] 11/1 found to have in-stent restenosis of pLAD and  of RCA, admitted to CICU for management of cardiogenic shock and ADHF requiring IABP 11/1 -11/7, with hospital course c/b vfib arrest requiring reinsertion of IABP, currently listed for transplant status 2.    ====================== NEUROLOGY=====================  Anxiety  - No Seroquel/antipsychotics since vfib arrest and prolonged QTC  - Psych Eval, recommended SSRI, but pt. refused   - Psych recommending atarax PRN  - Continue to monitor mental status    PT/Conditioning  - Continue band exercises while on bedrest s/t IABP    ==================== RESPIRATORY======================  Acute Hypoxemic Respiratory Failure  - s/p x2 intubations for cardiogenic pulm edema and the in setting of cardiac arrest, resolved - extubated 11/10  - Currently maintaining >95% sats on room air  - Continue incentive spirometry and monitoring of sp02    Asthma  - c/w symbicort  - On trelegy at home  - Continue to monitor SpO2 with goal >94%    ====================CARDIOVASCULAR==================  Vfib arrest i/s/o ischemia  - Lido gtt off 11/13  - PO Amio load - total of 5g per EP complete 11/17  - Amio dc'd 12/5 for rising LFTs  - Keep K > 4, Mag > 2.2     Cardiogenic shock requiring IABP (11/1- 11/7, 11/9-)  - Likely 2/2 NSTEMI and ADHF  - 11/1 [facility]: pLAD 100 % in-stent restenosis & mRCA, 100 %. PCWP 30. IABP placed.  - 11/1 TTE: LV dilated. EF 32 %. Regional WMAs present, mod (grade 2) LV diastolic dysfunction  - 11/2 TTE: EF 22% and + LV thrombus  - appears euvolemic  - IABP swapped to LFA 12/21, continue 1:1 support  - hydralazine 50 TID and ISD 30 TID for AL reduction  - c/w coreg 25 BID for GDMT  - [place] d/c'd due to elevated K levels  - UNOS status 2 as of 12/6    NSTEMI iso stent re-occlusion of pLAD and 100%  of RCA  - EKG on admission w/ LBBB  - DAPT: c/w ASA, Brilinta d/c'd per transplant w/u  - c/w lipitor 80  - cMR deferred given necessity of IABP  - CT sx not recommending CABG, undergoing AT eval    LV thrombus  - c/w heparin gtt w/ therapeutic PTT    ===================== RENAL =========================  Non-oliguric ARIC, resolved  - Cr 1.24 12/21  - Baseline Cr: 1-1.22  - Renal US: no evidence of renal artery stenosis  - Trend BMP, lytes daily, replace as needed  - Continue Strict I/Os, avoid nephrotoxins    =============== GASTROINTESTINAL===================  Constipation/ileus, resolved  - regular diet  - bowel reg prn    ===================ENDO====================  Type 2 DM  - A1c 8.3, glucose controlled  - Continue lantus, premeal, low ISS  - continue FS monitoring    ===================HEMATOLOGIC/ONC ===================  - H/H & plts stable, no s/sx of bleeding  - Monitor H/H and plts q48h  - VTE PPX: heparin gtt    ==================INFECTIOUS DISEASE================  Positive blood culture, resolved  - Repeat BCx drawn 11/30 for leukocytosis to 12k - positive on 12/4, G+ rods in anaerobic bottle, suspect contaminant  - Repeat cultures x2 sent 12/4 per transplant ID recs - no growth to date  - Vancomycin by trough (12/4-12/6)  - Final microbial ID: Cutibacterium acnes, per ID this is likely to be a skin contaminant, vanc dc'd.   - Dental eval 12/7 to rule out infectious etiology that would have led to bacteremia, no significant findings on exam.     Enterococcus faecalis bacteremia, resolved  - BCx 11/17+ for enterococcus faecalis x2, Staph epi x1 (likely contaminant)- pan sensitive   - Urine cx 11/18 + enterococcus faecalis  - BCx 11/18 no growth   - IABP site swapped to RFA 11/20  - s/p Vancomycin 1g q12h (11/18-11/27)  - CT A/P negative for infectious pathology    COVID, resolved  - Off airborne precautions 11/11    Pre-transplant ID w/u   - Trend ID recs for serologies   - Colonoscopy 11/17 - normal   - Chest CT 11/17 - improved LLL aeration  - s/p immunizations    ==================DERMATOLOGY================   Upper Extremity Rash  - Patient developed bilateral upper extremity rash after receiving Hepatitis A & B immunizations on 11/24  - Dermatology consulted 12/5 - not likely to be related to vanco  - Recommend clobetasol 0.05% BID to affected areas   - RVP repeated to rule out viral etiology, negative    Left neck mass  - US soft tissue:  lateral left neck 2.0 x 0.7 x 1.4 cm heterogeneous complex solid and cystic appearing lesion in subcutaneous soft tissue extending into subjacent strap muscle  - vasc cardiology consult: JOSE J venous duplex US ordered, 12/20 - Subcentimeter lymph nodes are present in the left neck and axilla, No evidence of left upper extremity DVT, Superficial thrombosis of the cephalic vein in the left upper arm.  - continue to monitor site    Lines: RFA IABP (11/20-12/21), RRA (12/14-), LFA IABP (12/21-   ====================ASSESSMENT ==============  58 male with HTN, CAD (s/p PCI 2008), HFrEF, CVA 2018, and T2DM presenting with chest pressure and unknown tachycardia that was shocked x1, [facility] 11/1 found to have in-stent restenosis of pLAD and  of RCA, admitted to CICU for management of cardiogenic shock and ADHF requiring IABP 11/1 -11/7, with hospital course c/b vfib arrest requiring reinsertion of IABP, currently listed for transplant status 2.    ====================== NEUROLOGY=====================  Anxiety  - No Seroquel/antipsychotics since vfib arrest and prolonged QTC  - Psych Eval, recommended SSRI, but pt. refused   - Psych recommending atarax PRN  - Continue to monitor mental status    PT/Conditioning  - Continue band exercises while on bedrest s/t IABP    ==================== RESPIRATORY======================  Acute Hypoxemic Respiratory Failure  - s/p x2 intubations for cardiogenic pulm edema and the in setting of cardiac arrest, resolved - extubated 11/10  - Currently maintaining >95% sats on room air  - Continue incentive spirometry and monitoring of sp02    Asthma  - c/w symbicort  - On trelegy at home  - Continue to monitor SpO2 with goal >94%    ====================CARDIOVASCULAR==================  Vfib arrest i/s/o ischemia  - Lido gtt off 11/13  - PO Amio load - total of 5g per EP complete 11/17  - Amio dc'd 12/5 for rising LFTs  - Keep K > 4, Mag > 2.2     Cardiogenic shock requiring IABP (11/1- 11/7, 11/9-)  - Likely 2/2 NSTEMI and ADHF  - 11/1 [facility]: pLAD 100 % in-stent restenosis & mRCA, 100 %. PCWP 30. IABP placed.  - 11/1 TTE: LV dilated. EF 32 %. Regional WMAs present, mod (grade 2) LV diastolic dysfunction  - 11/2 TTE: EF 22% and + LV thrombus  - appears euvolemic  - IABP swapped to LFA 12/21, continue 1:1 support  - hydralazine 50 TID and ISD 30 TID for AL reduction  - c/w coreg 25 BID for GDMT  - [place] d/c'd due to elevated K levels  - UNOS status 2 as of 12/6    NSTEMI iso stent re-occlusion of pLAD and 100%  of RCA  - EKG on admission w/ LBBB  - DAPT: c/w ASA, Brilinta d/c'd per transplant w/u  - c/w lipitor 80  - cMR deferred given necessity of IABP  - CT sx not recommending CABG, undergoing AT eval    LV thrombus  - c/w heparin gtt w/ therapeutic PTT    ===================== RENAL =========================  Non-oliguric ARIC, resolved  - Cr 1.24 12/21  - Baseline Cr: 1-1.22  - Renal US: no evidence of renal artery stenosis  - Trend BMP, lytes daily, replace as needed  - Continue Strict I/Os, avoid nephrotoxins    =============== GASTROINTESTINAL===================  Constipation/ileus, resolved  - regular diet  - bowel reg prn    ===================ENDO====================  Type 2 DM  - A1c 8.3, glucose controlled  - Continue lantus, premeal, low ISS  - continue FS monitoring    ===================HEMATOLOGIC/ONC ===================  - H/H & plts stable, no s/sx of bleeding  - Monitor H/H and plts q48h  - VTE PPX: heparin gtt    ==================INFECTIOUS DISEASE================  Positive blood culture, resolved  - Repeat BCx drawn 11/30 for leukocytosis to 12k - positive on 12/4, G+ rods in anaerobic bottle, suspect contaminant  - Repeat cultures x2 sent 12/4 per transplant ID recs - no growth to date  - Vancomycin by trough (12/4-12/6)  - Final microbial ID: Cutibacterium acnes, per ID this is likely to be a skin contaminant, vanc dc'd.   - Dental eval 12/7 to rule out infectious etiology that would have led to bacteremia, no significant findings on exam.     Enterococcus faecalis bacteremia, resolved  - BCx 11/17+ for enterococcus faecalis x2, Staph epi x1 (likely contaminant)- pan sensitive   - Urine cx 11/18 + enterococcus faecalis  - BCx 11/18 no growth   - IABP site swapped to RFA 11/20  - s/p Vancomycin 1g q12h (11/18-11/27)  - CT A/P negative for infectious pathology    COVID, resolved  - Off airborne precautions 11/11    Pre-transplant ID w/u   - Trend ID recs for serologies   - Colonoscopy 11/17 - normal   - Chest CT 11/17 - improved LLL aeration  - s/p immunizations    ==================DERMATOLOGY================   Upper Extremity Rash  - Patient developed bilateral upper extremity rash after receiving Hepatitis A & B immunizations on 11/24  - Dermatology consulted 12/5 - not likely to be related to vanco  - Recommend clobetasol 0.05% BID to affected areas   - RVP repeated to rule out viral etiology, negative    Left neck mass  - US soft tissue:  lateral left neck 2.0 x 0.7 x 1.4 cm heterogeneous complex solid and cystic appearing lesion in subcutaneous soft tissue extending into subjacent strap muscle  - vasc cardiology consult: JOSE J venous duplex US ordered, 12/20 - Subcentimeter lymph nodes are present in the left neck and axilla, No evidence of left upper extremity DVT, Superficial thrombosis of the cephalic vein in the left upper arm.  - continue to monitor site    Lines: RFA IABP (11/20-12/21), RRA (12/14-), LFA IABP (12/21-

## 2023-12-22 NOTE — PROGRESS NOTE ADULT - SUBJECTIVE AND OBJECTIVE BOX
LD VALENCIA  58y Male  MRN:[id number]    Patient is a 58y old  Male who presents with a chief complaint of NSTEMI (01 Nov 2023 20:29)    HPI:  57yo M w/ hx HTN, CAD w/ 1 stent in 2009, ICH (2008) presenting with abn ekg. Patient presented to [facility] where he was found to have STEMI, recommended to get cath however patient did not want to get it there so it left and came here.  Patient initially had cough, congestion, fever, was placed on antibiotics on Sunday.  Started feeling nauseous and had a presyncopal event after which he presented to ED last night.  Had chest pain as well.  Chest pain is midsternal.  Not currently having chest pain.  Received 4 aspirin 30 min pta. (01 Nov 2023 15:11)      Patient seen and evaluated at bedside in CCU. interval events noted    Interval HPI: remain in ccu. IABP in place        PAST MEDICAL & SURGICAL HISTORY:  HTN (hypertension)      CAD (coronary artery disease)  2009; stent      Intracranial hemorrhage  2008      Respiratory arrest  december 1st      Myocardial infarction, unspecified MI type, unspecified artery      History of coronary artery stent placement          REVIEW OF SYSTEMS:  as per hpi     VITALS:   Vital Signs Last 24 Hrs  T(C): 36.7 (22 Dec 2023 11:00), Max: 37 (21 Dec 2023 20:00)  T(F): 98.1 (22 Dec 2023 11:00), Max: 98.6 (21 Dec 2023 20:00)  HR: 83 (22 Dec 2023 13:00) (71 - 87)  BP: --  BP(mean): --  RR: 18 (22 Dec 2023 13:00) (16 - 26)  SpO2: 96% (22 Dec 2023 13:00) (95% - 100%)    Parameters below as of 22 Dec 2023 13:00  Patient On (Oxygen Delivery Method): room air              PHYSICAL EXAM:  GENERAL: NAD, comfortable   HEAD:  Atraumatic, Normocephalic  EYES: EOMI, PERRLA, conjunctiva and sclera clear  NECK: Supple, No JVD   CHEST/LUNG: Clear to auscultation bilaterally; No wheeze  HEART: Regular rate and rhythm; No murmurs, rubs, or gallops  ABDOMEN: Soft, Nontender, Nondistended; Bowel sounds present  EXTREMITIES:  2+ Peripheral Pulses, No clubbing, cyanosis, or edema  NEUROLOGY: nonfocal  SKIN: +rash  +iabp    Consultant(s) Notes Reviewed:  [x ] YES  [ ] NO  Care Discussed with Consultants/Other Providers [ x] YES  [ ] NO    MEDS:   MEDICATIONS  (STANDING):  aspirin  chewable 81 milliGRAM(s) Oral daily  atorvastatin 80 milliGRAM(s) Oral at bedtime  bisacodyl 5 milliGRAM(s) Oral every 12 hours  budesonide  80 MICROgram(s)/formoterol 4.5 MICROgram(s) Inhaler 2 Puff(s) Inhalation two times a day  carvedilol 25 milliGRAM(s) Oral every 12 hours  chlorhexidine 2% Cloths 1 Application(s) Topical daily  heparin  Infusion 1300 Unit(s)/Hr (14 mL/Hr) IV Continuous <Continuous>  hepatitis B Vaccine - Adult (HEPLISAV-B) 20 MICROGram(s) IntraMuscular once  hydrALAZINE 50 milliGRAM(s) Oral every 8 hours  insulin glargine Injectable (LANTUS) 12 Unit(s) SubCutaneous at bedtime  insulin lispro (ADMELOG) corrective regimen sliding scale   SubCutaneous at bedtime  insulin lispro (ADMELOG) corrective regimen sliding scale   SubCutaneous three times a day before meals  insulin lispro Injectable (ADMELOG) 9 Unit(s) SubCutaneous three times a day before meals  isosorbide   dinitrate Tablet (ISORDIL) 30 milliGRAM(s) Oral three times a day  polyethylene glycol 3350 17 Gram(s) Oral two times a day  senna 2 Tablet(s) Oral at bedtime    MEDICATIONS  (PRN):  hydrOXYzine hydrochloride 25 milliGRAM(s) Oral two times a day PRN Anxiety      ALLERGIES:  penicillins (Unknown)      LABS:                                                                                           12.1   7.21  )-----------( 181      ( 21 Dec 2023 02:02 )             36.2   12-21    134<L>  |  104  |  32<H>  ----------------------------<  143<H>  4.3   |  19<L>  |  1.24    Ca    10.0      21 Dec 2023 02:02  Phos  3.9     12-21  Mg     1.9     12-21    TPro  6.9  /  Alb  3.9  /  TBili  0.2  /  DBili  x   /  AST  22  /  ALT  67<H>  /  AlkPhos  63  12-21      < from: CT Abdomen and Pelvis No Cont (11.28.23 @ 03:38) >  IMPRESSION:  Mildly dilated colon and prominent but not overly dilated small bowel   with air-fluid levels. No discrete transition point. Findings are   suggestive of ileus.    Intra-aortic balloon pump with the inferior marker at the level of the   inferior mesenteric artery and the balloon overlying the origins of the   renal arteries, celiac artery, and superior mesenteric artery. Consider   repositioning. Concern for IABP positioning was discussed with LANETTE Hawthorne   on 11/28/2023 at 3:42 AM by Dr. Shepard.    < end of copied text >          < from: Colonoscopy (11.17.23 @ 10:35) >                                                                                                        Impression:          - The entire examined colon is normal on direct and retroflexion views.                       - No specimens collected.  Recommendation:      - Resume previous diet today.                       - No large polyps or masses detected, No objection from GI standpoint to                 proceed with heart transplantation/advanced heart therapies.                       - Please call back should any further questions or concerns arise.    < end of copied text >     < from: Xray Chest 1 View- PORTABLE-Urgent (11.01.23 @ 07:42) >    IMPRESSION:  Clear lungs.    ---End of Report ---        < end of copied text >  < from: TTE W or WO Ultrasound Enhancing Agent (11.01.23 @ 10:23) >  _____________________________     CONCLUSIONS:      1. Left ventricular cavity is moderately dilated. Left ventricular wall thickness is normal. Left ventricular systolic function is severely decreased with an ejection fraction of 32 % by Chinchilla's method of disks. Regional wall motion abnormalities present.   2. Multiple segmental abnormalities exist. See findings.   3. There is moderate (grade 2) left ventricular diastolic dysfunction, with indeterminant filling pressure.   4. Normal right ventricular cavity size, wall thickness, and systolic function.   5. No significant valvular disease.   6. No pericardial effusion seen.   7. Compared to the transthoracic echocardiogram performed on 1/25/2017 the areas of akinesis are unchanged but there has been a decline in LV systolic function with new areas of hypokinesis.    __________________________________________________________________    < end of copied text >  < from: TTE Limited W or WO Ultrasound Enhancing Agent (11.02.23 @ 07:41) >  __________________________     CONCLUSIONS:      1. After obtaining consent, Definity ultrasound enhancing agent was given for enhanced left ventricular opacification and improved delineation of the left ventricular endocardial borders. Left ventricular systolic function is severely decreased with a calculated ejection fraction of 22 % by the Chinchilla's biplane method of disks. There is a left ventricular thrombus.   2. Findings were discussed with Litzy BOSS on 11/2/2023 at 8.49am.   3. There is a left ventricular thrombus.    _________________________________________________________________    < end of copied text >   LD VALENCIA  58y Male  MRN:[id number]    Patient is a 58y old  Male who presents with a chief complaint of NSTEMI (01 Nov 2023 20:29)    HPI:  57yo M w/ hx HTN, CAD w/ 1 stent in 2009, ICH (2008) presenting with abn ekg. Patient presented to [facility] where he was found to have STEMI, recommended to get cath however patient did not want to get it there so it left and came here.  Patient initially had cough, congestion, fever, was placed on antibiotics on Sunday.  Started feeling nauseous and had a presyncopal event after which he presented to ED last night.  Had chest pain as well.  Chest pain is midsternal.  Not currently having chest pain.  Received 4 aspirin 30 min pta. (01 Nov 2023 15:11)      Patient seen and evaluated at bedside in CCU. interval events noted    Interval HPI: remain in ccu. IABP in place        PAST MEDICAL & SURGICAL HISTORY:  HTN (hypertension)      CAD (coronary artery disease)  2009; stent      Intracranial hemorrhage  2008      Respiratory arrest  december 1st      Myocardial infarction, unspecified MI type, unspecified artery      History of coronary artery stent placement          REVIEW OF SYSTEMS:  as per hpi     VITALS:   Vital Signs Last 24 Hrs  T(C): 36.7 (22 Dec 2023 11:00), Max: 37 (21 Dec 2023 20:00)  T(F): 98.1 (22 Dec 2023 11:00), Max: 98.6 (21 Dec 2023 20:00)  HR: 83 (22 Dec 2023 13:00) (71 - 87)  BP: --  BP(mean): --  RR: 18 (22 Dec 2023 13:00) (16 - 26)  SpO2: 96% (22 Dec 2023 13:00) (95% - 100%)    Parameters below as of 22 Dec 2023 13:00  Patient On (Oxygen Delivery Method): room air              PHYSICAL EXAM:  GENERAL: NAD, comfortable   HEAD:  Atraumatic, Normocephalic  EYES: EOMI, PERRLA, conjunctiva and sclera clear  NECK: Supple, No JVD   CHEST/LUNG: Clear to auscultation bilaterally; No wheeze  HEART: Regular rate and rhythm; No murmurs, rubs, or gallops  ABDOMEN: Soft, Nontender, Nondistended; Bowel sounds present  EXTREMITIES:  2+ Peripheral Pulses, No clubbing, cyanosis, or edema  NEUROLOGY: nonfocal  SKIN: +rash  +iabp    Consultant(s) Notes Reviewed:  [x ] YES  [ ] NO  Care Discussed with Consultants/Other Providers [ x] YES  [ ] NO    MEDS:   MEDICATIONS  (STANDING):  aspirin  chewable 81 milliGRAM(s) Oral daily  atorvastatin 80 milliGRAM(s) Oral at bedtime  bisacodyl 5 milliGRAM(s) Oral every 12 hours  budesonide  80 MICROgram(s)/formoterol 4.5 MICROgram(s) Inhaler 2 Puff(s) Inhalation two times a day  carvedilol 25 milliGRAM(s) Oral every 12 hours  chlorhexidine 2% Cloths 1 Application(s) Topical daily  heparin  Infusion 1300 Unit(s)/Hr (14 mL/Hr) IV Continuous <Continuous>  hepatitis B Vaccine - Adult (HEPLISAV-B) 20 MICROGram(s) IntraMuscular once  hydrALAZINE 50 milliGRAM(s) Oral every 8 hours  insulin glargine Injectable (LANTUS) 12 Unit(s) SubCutaneous at bedtime  insulin lispro (ADMELOG) corrective regimen sliding scale   SubCutaneous at bedtime  insulin lispro (ADMELOG) corrective regimen sliding scale   SubCutaneous three times a day before meals  insulin lispro Injectable (ADMELOG) 9 Unit(s) SubCutaneous three times a day before meals  isosorbide   dinitrate Tablet (ISORDIL) 30 milliGRAM(s) Oral three times a day  polyethylene glycol 3350 17 Gram(s) Oral two times a day  senna 2 Tablet(s) Oral at bedtime    MEDICATIONS  (PRN):  hydrOXYzine hydrochloride 25 milliGRAM(s) Oral two times a day PRN Anxiety      ALLERGIES:  penicillins (Unknown)      LABS:                                                                                           12.1   7.21  )-----------( 181      ( 21 Dec 2023 02:02 )             36.2   12-21    134<L>  |  104  |  32<H>  ----------------------------<  143<H>  4.3   |  19<L>  |  1.24    Ca    10.0      21 Dec 2023 02:02  Phos  3.9     12-21  Mg     1.9     12-21    TPro  6.9  /  Alb  3.9  /  TBili  0.2  /  DBili  x   /  AST  22  /  ALT  67<H>  /  AlkPhos  63  12-21      < from: CT Abdomen and Pelvis No Cont (11.28.23 @ 03:38) >  IMPRESSION:  Mildly dilated colon and prominent but not overly dilated small bowel   with air-fluid levels. No discrete transition point. Findings are   suggestive of ileus.    Intra-aortic balloon pump with the inferior marker at the level of the   inferior mesenteric artery and the balloon overlying the origins of the   renal arteries, celiac artery, and superior mesenteric artery. Consider   repositioning. Concern for IABP positioning was discussed with LANETTE Hawthorne   on 11/28/2023 at 3:42 AM by Dr. Shepard.    < end of copied text >          < from: Colonoscopy (11.17.23 @ 10:35) >                                                                                                        Impression:          - The entire examined colon is normal on direct and retroflexion views.                       - No specimens collected.  Recommendation:      - Resume previous diet today.                       - No large polyps or masses detected, No objection from GI standpoint to                 proceed with heart transplantation/advanced heart therapies.                       - Please call back should any further questions or concerns arise.    < end of copied text >     < from: Xray Chest 1 View- PORTABLE-Urgent (11.01.23 @ 07:42) >    IMPRESSION:  Clear lungs.    ---End of Report ---        < end of copied text >  < from: TTE W or WO Ultrasound Enhancing Agent (11.01.23 @ 10:23) >  _____________________________     CONCLUSIONS:      1. Left ventricular cavity is moderately dilated. Left ventricular wall thickness is normal. Left ventricular systolic function is severely decreased with an ejection fraction of 32 % by Chinchilla's method of disks. Regional wall motion abnormalities present.   2. Multiple segmental abnormalities exist. See findings.   3. There is moderate (grade 2) left ventricular diastolic dysfunction, with indeterminant filling pressure.   4. Normal right ventricular cavity size, wall thickness, and systolic function.   5. No significant valvular disease.   6. No pericardial effusion seen.   7. Compared to the transthoracic echocardiogram performed on 1/25/2017 the areas of akinesis are unchanged but there has been a decline in LV systolic function with new areas of hypokinesis.    __________________________________________________________________    < end of copied text >  < from: TTE Limited W or WO Ultrasound Enhancing Agent (11.02.23 @ 07:41) >  __________________________     CONCLUSIONS:      1. After obtaining consent, Definity ultrasound enhancing agent was given for enhanced left ventricular opacification and improved delineation of the left ventricular endocardial borders. Left ventricular systolic function is severely decreased with a calculated ejection fraction of 22 % by the Chinchilla's biplane method of disks. There is a left ventricular thrombus.   2. Findings were discussed with Litzy BOSS on 11/2/2023 at 8.49am.   3. There is a left ventricular thrombus.    _________________________________________________________________    < end of copied text >

## 2023-12-22 NOTE — PROGRESS NOTE ADULT - SUBJECTIVE AND OBJECTIVE BOX
PATIENT:  DEVORAH VALENCIA  [id number]    CHIEF COMPLAINT:  Patient is a 58y old  Male who presents with a chief complaint of NSTEMI, ADHF (21 Dec 2023 08:05)      INTERVAL HISTORY/OVERNIGHT EVENTS:  The patient was seen and examined at bedside.     REVIEW OF SYSTEMS:    Constitutional:     [ ] negative [ ] fevers [ ] chills [ ] weight loss [ ] weight gain  HEENT:                  [ ] negative [ ] dry eyes [ ] eye irritation [ ] postnasal drip [ ] nasal congestion  CV:                         [ ] negative  [ ] chest pain [ ] orthopnea [ ] palpitations [ ] murmur  Resp:                     [ ] negative [ ] cough [ ] shortness of breath [ ] dyspnea [ ] wheezing [ ] sputum [ ] hemoptysis  GI:                          [ ] negative [ ] nausea [ ] vomiting [ ] diarrhea [ ] constipation [ ] abd pain [ ] dysphagia   :                        [ ] negative [ ] dysuria [ ] nocturia [ ] hematuria [ ] increased urinary frequency  Musculoskeletal: [ ] negative [ ] back pain [ ] myalgias [ ] arthralgias [ ] fracture  Skin:                       [ ] negative [ ] rash [ ] itch  Neurological:        [ ] negative [ ] headache [ ] dizziness [ ] syncope [ ] weakness [ ] numbness  Psychiatric:           [ ] negative [ ] anxiety [ ] depression  Endocrine:            [ ] negative [ ] diabetes [ ] thyroid problem  Heme/Lymph:      [ ] negative [ ] anemia [ ] bleeding problem  Allergic/Immune: [ ] negative [ ] itchy eyes [ ] nasal discharge [ ] hives [ ] angioedema    [ ] All other systems negative  [ ] Unable to assess ROS because ________.    MEDICATIONS:  MEDICATIONS  (STANDING):  aspirin  chewable 81 milliGRAM(s) Oral daily  atorvastatin 80 milliGRAM(s) Oral at bedtime  bisacodyl 5 milliGRAM(s) Oral every 12 hours  budesonide  80 MICROgram(s)/formoterol 4.5 MICROgram(s) Inhaler 2 Puff(s) Inhalation two times a day  carvedilol 25 milliGRAM(s) Oral every 12 hours  chlorhexidine 2% Cloths 1 Application(s) Topical daily  heparin  Infusion 1300 Unit(s)/Hr (14 mL/Hr) IV Continuous <Continuous>  hepatitis B Vaccine - Adult (HEPLISAV-B) 20 MICROGram(s) IntraMuscular once  hydrALAZINE 50 milliGRAM(s) Oral every 8 hours  insulin glargine Injectable (LANTUS) 12 Unit(s) SubCutaneous at bedtime  insulin lispro (ADMELOG) corrective regimen sliding scale   SubCutaneous three times a day before meals  insulin lispro (ADMELOG) corrective regimen sliding scale   SubCutaneous at bedtime  insulin lispro Injectable (ADMELOG) 9 Unit(s) SubCutaneous three times a day before meals  isosorbide   dinitrate Tablet (ISORDIL) 30 milliGRAM(s) Oral three times a day  polyethylene glycol 3350 17 Gram(s) Oral two times a day  senna 2 Tablet(s) Oral at bedtime    MEDICATIONS  (PRN):  hydrOXYzine hydrochloride 25 milliGRAM(s) Oral two times a day PRN Anxiety      ALLERGIES:  Allergies    penicillins (Unknown)    Intolerances        OBJECTIVE:  ICU Vital Signs Last 24 Hrs  T(C): 36.7 (22 Dec 2023 04:00), Max: 37 (21 Dec 2023 20:00)  T(F): 98.1 (22 Dec 2023 04:00), Max: 98.6 (21 Dec 2023 20:00)  HR: 76 (22 Dec 2023 07:00) (73 - 87)  BP: --  BP(mean): --  ABP: --  ABP(mean): --  RR: 18 (22 Dec 2023 07:00) (16 - 26)  SpO2: 98% (22 Dec 2023 07:00) (95% - 100%)    O2 Parameters below as of 22 Dec 2023 07:00  Patient On (Oxygen Delivery Method): room air            Adult Advanced Hemodynamics Last 24 Hrs  CVP(mm Hg): --  CVP(cm H2O): --  CO: --  CI: --  PA: --  PA(mean): --  PCWP: --  SVR: --  SVRI: --  PVR: --  PVRI: --  CAPILLARY BLOOD GLUCOSE      POCT Blood Glucose.: 139 mg/dL (21 Dec 2023 21:15)  POCT Blood Glucose.: 100 mg/dL (21 Dec 2023 18:18)  POCT Blood Glucose.: 146 mg/dL (21 Dec 2023 12:08)  POCT Blood Glucose.: 112 mg/dL (21 Dec 2023 08:11)    CAPILLARY BLOOD GLUCOSE      POCT Blood Glucose.: 139 mg/dL (21 Dec 2023 21:15)    I&O's Summary    21 Dec 2023 07:01  -  22 Dec 2023 07:00  --------------------------------------------------------  IN: 796 mL / OUT: 1275 mL / NET: -479 mL      Daily     Daily Weight in k.2 (22 Dec 2023 06:00)    PHYSICAL EXAMINATION:  General: WN/WD NAD  HEENT: PERRLA, EOMI, moist mucous membranes  Neurology: A&Ox3, nonfocal, MOISE x 4  Respiratory: CTA B/L, normal respiratory effort, no wheezes, crackles, rales  CV: RRR, S1S2, no murmurs, rubs or gallops  Abdominal: Soft, NT, ND +BS, Last BM  Extremities: No edema, + peripheral pulses  Incisions:   Tubes:    LABS:                          12.1   7.21  )-----------( 181      ( 21 Dec 2023 02:02 )             36.2     12-21    134<L>  |  104  |  32<H>  ----------------------------<  143<H>  4.3   |  19<L>  |  1.24    Ca    10.0      21 Dec 2023 02:02  Phos  3.9     12-21  Mg     1.9     12-21    TPro  6.9  /  Alb  3.9  /  TBili  0.2  /  DBili  x   /  AST  22  /  ALT  67<H>  /  AlkPhos  63  12-21    LIVER FUNCTIONS - ( 21 Dec 2023 02:02 )  Alb: 3.9 g/dL / Pro: 6.9 g/dL / ALK PHOS: 63 U/L / ALT: 67 U/L / AST: 22 U/L / GGT: x           PT/INR - ( 21 Dec 2023 02:02 )   PT: 12.0 sec;   INR: 1.15 ratio         PTT - ( 21 Dec 2023 02:02 )  PTT:79.1 sec        Urinalysis Basic - ( 21 Dec 2023 02:02 )    Color: x / Appearance: x / SG: x / pH: x  Gluc: 143 mg/dL / Ketone: x  / Bili: x / Urobili: x   Blood: x / Protein: x / Nitrite: x   Leuk Esterase: x / RBC: x / WBC x   Sq Epi: x / Non Sq Epi: x / Bacteria: x        TELEMETRY:     EKG:     IMAGING:       PATIENT:  DEVORAH VALENCIA  [id number]    CHIEF COMPLAINT:  Patient is a 58y old  Male who presents with a chief complaint of NSTEMI, ADHF (21 Dec 2023 08:05)      INTERVAL HISTORY/OVERNIGHT EVENTS:  The patient was seen and examined at bedside.     REVIEW OF SYSTEMS:    Constitutional:     [ ] negative [ ] fevers [ ] chills [ ] weight loss [ ] weight gain  HEENT:                  [ ] negative [ ] dry eyes [ ] eye irritation [ ] postnasal drip [ ] nasal congestion  CV:                         [ ] negative  [ ] chest pain [ ] orthopnea [ ] palpitations [ ] murmur  Resp:                     [ ] negative [ ] cough [ ] shortness of breath [ ] dyspnea [ ] wheezing [ ] sputum [ ] hemoptysis  GI:                          [ ] negative [ ] nausea [ ] vomiting [ ] diarrhea [ ] constipation [ ] abd pain [ ] dysphagia   :                        [ ] negative [ ] dysuria [ ] nocturia [ ] hematuria [ ] increased urinary frequency  Musculoskeletal: [ ] negative [ ] back pain [ ] myalgias [ ] arthralgias [ ] fracture  Skin:                       [ ] negative [ ] rash [ ] itch  Neurological:        [ ] negative [ ] headache [ ] dizziness [ ] syncope [ ] weakness [ ] numbness  Psychiatric:           [ ] negative [ ] anxiety [ ] depression  Endocrine:            [ ] negative [ ] diabetes [ ] thyroid problem  Heme/Lymph:      [ ] negative [ ] anemia [ ] bleeding problem  Allergic/Immune: [ ] negative [ ] itchy eyes [ ] nasal discharge [ ] hives [ ] angioedema    [ ] All other systems negative  [ ] Unable to assess ROS because ________.    MEDICATIONS:  MEDICATIONS  (STANDING):  aspirin  chewable 81 milliGRAM(s) Oral daily  atorvastatin 80 milliGRAM(s) Oral at bedtime  bisacodyl 5 milliGRAM(s) Oral every 12 hours  budesonide  80 MICROgram(s)/formoterol 4.5 MICROgram(s) Inhaler 2 Puff(s) Inhalation two times a day  carvedilol 25 milliGRAM(s) Oral every 12 hours  chlorhexidine 2% Cloths 1 Application(s) Topical daily  heparin  Infusion 1300 Unit(s)/Hr (14 mL/Hr) IV Continuous <Continuous>  hepatitis B Vaccine - Adult (HEPLISAV-B) 20 MICROGram(s) IntraMuscular once  hydrALAZINE 50 milliGRAM(s) Oral every 8 hours  insulin glargine Injectable (LANTUS) 12 Unit(s) SubCutaneous at bedtime  insulin lispro (ADMELOG) corrective regimen sliding scale   SubCutaneous three times a day before meals  insulin lispro (ADMELOG) corrective regimen sliding scale   SubCutaneous at bedtime  insulin lispro Injectable (ADMELOG) 9 Unit(s) SubCutaneous three times a day before meals  isosorbide   dinitrate Tablet (ISORDIL) 30 milliGRAM(s) Oral three times a day  polyethylene glycol 3350 17 Gram(s) Oral two times a day  senna 2 Tablet(s) Oral at bedtime    MEDICATIONS  (PRN):  hydrOXYzine hydrochloride 25 milliGRAM(s) Oral two times a day PRN Anxiety      ALLERGIES:  Allergies    penicillins (Unknown)    Intolerances        OBJECTIVE:  ICU Vital Signs Last 24 Hrs  T(C): 36.7 (22 Dec 2023 04:00), Max: 37 (21 Dec 2023 20:00)  T(F): 98.1 (22 Dec 2023 04:00), Max: 98.6 (21 Dec 2023 20:00)  HR: 76 (22 Dec 2023 07:00) (73 - 87)  BP: --  BP(mean): --  ABP: --  ABP(mean): --  RR: 18 (22 Dec 2023 07:00) (16 - 26)  SpO2: 98% (22 Dec 2023 07:00) (95% - 100%)    O2 Parameters below as of 22 Dec 2023 07:00  Patient On (Oxygen Delivery Method): room air            Adult Advanced Hemodynamics Last 24 Hrs  CVP(mm Hg): --  CVP(cm H2O): --  CO: --  CI: --  PA: --  PA(mean): --  PCWP: --  SVR: --  SVRI: --  PVR: --  PVRI: --  CAPILLARY BLOOD GLUCOSE      POCT Blood Glucose.: 139 mg/dL (21 Dec 2023 21:15)  POCT Blood Glucose.: 100 mg/dL (21 Dec 2023 18:18)  POCT Blood Glucose.: 146 mg/dL (21 Dec 2023 12:08)  POCT Blood Glucose.: 112 mg/dL (21 Dec 2023 08:11)    CAPILLARY BLOOD GLUCOSE      POCT Blood Glucose.: 139 mg/dL (21 Dec 2023 21:15)    I&O's Summary    21 Dec 2023 07:01  -  22 Dec 2023 07:00  --------------------------------------------------------  IN: 796 mL / OUT: 1275 mL / NET: -479 mL      Daily     Daily Weight in k.2 (22 Dec 2023 06:00)    PHYSICAL EXAMINATION:  General: WN/WD NAD  HEENT: PERRLA, EOMI, moist mucous membranes  Neurology: A&Ox3, nonfocal, MOISE x 4  Respiratory: CTA B/L, normal respiratory effort, no wheezes, crackles, rales  CV: RRR, S1S2, no murmurs, rubs or gallops  Abdominal: Soft, NT, ND +BS, Last BM  Extremities: No edema, + peripheral pulses  Incisions:   Tubes:    LABS:                          12.1   7.21  )-----------( 181      ( 21 Dec 2023 02:02 )             36.2     12-21    134<L>  |  104  |  32<H>  ----------------------------<  143<H>  4.3   |  19<L>  |  1.24    Ca    10.0      21 Dec 2023 02:02  Phos  3.9     12-21  Mg     1.9     12-21    TPro  6.9  /  Alb  3.9  /  TBili  0.2  /  DBili  x   /  AST  22  /  ALT  67<H>  /  AlkPhos  63  12-21    LIVER FUNCTIONS - ( 21 Dec 2023 02:02 )  Alb: 3.9 g/dL / Pro: 6.9 g/dL / ALK PHOS: 63 U/L / ALT: 67 U/L / AST: 22 U/L / GGT: x           PT/INR - ( 21 Dec 2023 02:02 )   PT: 12.0 sec;   INR: 1.15 ratio         PTT - ( 21 Dec 2023 02:02 )  PTT:79.1 sec        Urinalysis Basic - ( 21 Dec 2023 02:02 )    Color: x / Appearance: x / SG: x / pH: x  Gluc: 143 mg/dL / Ketone: x  / Bili: x / Urobili: x   Blood: x / Protein: x / Nitrite: x   Leuk Esterase: x / RBC: x / WBC x   Sq Epi: x / Non Sq Epi: x / Bacteria: x        TELEMETRY:     EKG:     IMAGING:       PATIENT:  DEVORAH VALENCIA  [id number]    CHIEF COMPLAINT:  Patient is a 58y old  Male who presents with a chief complaint of NSTEMI, ADHF (21 Dec 2023 08:05)      INTERVAL HISTORY/OVERNIGHT EVENTS:  - left IABP placed yesterday  -rt fem groin sheath removed    REVIEW OF SYSTEMS:  GENERAL: No fever or chills  EYES: No change in vision  HEENT: No trouble swallowing or speaking  CARDIAC: No chest pain  PULMONARY: No cough or SOB  GI: No abdominal pain, no nausea or no vomiting, no diarrhea or constipation  : No changes in urination  SKIN: No rashes  NEURO: No headache, no numbness  MSK: No joint pain    MEDICATIONS:  MEDICATIONS  (STANDING):  aspirin  chewable 81 milliGRAM(s) Oral daily  atorvastatin 80 milliGRAM(s) Oral at bedtime  bisacodyl 5 milliGRAM(s) Oral every 12 hours  budesonide  80 MICROgram(s)/formoterol 4.5 MICROgram(s) Inhaler 2 Puff(s) Inhalation two times a day  carvedilol 25 milliGRAM(s) Oral every 12 hours  chlorhexidine 2% Cloths 1 Application(s) Topical daily  heparin  Infusion 1300 Unit(s)/Hr (14 mL/Hr) IV Continuous <Continuous>  hepatitis B Vaccine - Adult (HEPLISAV-B) 20 MICROGram(s) IntraMuscular once  hydrALAZINE 50 milliGRAM(s) Oral every 8 hours  insulin glargine Injectable (LANTUS) 12 Unit(s) SubCutaneous at bedtime  insulin lispro (ADMELOG) corrective regimen sliding scale   SubCutaneous three times a day before meals  insulin lispro (ADMELOG) corrective regimen sliding scale   SubCutaneous at bedtime  insulin lispro Injectable (ADMELOG) 9 Unit(s) SubCutaneous three times a day before meals  isosorbide   dinitrate Tablet (ISORDIL) 30 milliGRAM(s) Oral three times a day  polyethylene glycol 3350 17 Gram(s) Oral two times a day  senna 2 Tablet(s) Oral at bedtime    MEDICATIONS  (PRN):  hydrOXYzine hydrochloride 25 milliGRAM(s) Oral two times a day PRN Anxiety      ALLERGIES:  Allergies    penicillins (Unknown)    Intolerances        OBJECTIVE:  ICU Vital Signs Last 24 Hrs  T(C): 36.7 (22 Dec 2023 04:00), Max: 37 (21 Dec 2023 20:00)  T(F): 98.1 (22 Dec 2023 04:00), Max: 98.6 (21 Dec 2023 20:00)  HR: 76 (22 Dec 2023 07:00) (73 - 87)  BP: --  BP(mean): --  ABP: --  ABP(mean): --  RR: 18 (22 Dec 2023 07:00) (16 - 26)  SpO2: 98% (22 Dec 2023 07:00) (95% - 100%)    O2 Parameters below as of 22 Dec 2023 07:00  Patient On (Oxygen Delivery Method): room air    CAPILLARY BLOOD GLUCOSE      POCT Blood Glucose.: 139 mg/dL (21 Dec 2023 21:15)  POCT Blood Glucose.: 100 mg/dL (21 Dec 2023 18:18)  POCT Blood Glucose.: 146 mg/dL (21 Dec 2023 12:08)  POCT Blood Glucose.: 112 mg/dL (21 Dec 2023 08:11)    CAPILLARY BLOOD GLUCOSE      POCT Blood Glucose.: 139 mg/dL (21 Dec 2023 21:15)    I&O's Summary    21 Dec 2023 07:01  -  22 Dec 2023 07:00  --------------------------------------------------------  IN: 796 mL / OUT: 1275 mL / NET: -479 mL      Daily     Daily Weight in k.2 (22 Dec 2023 06:00)    PHYSICAL EXAMINATION:  GENERAL: No acute distress, well-developed  HEAD:  Atraumatic, Normocephalic  EYES: EOMI, PERRLA, conjunctiva and sclera clear  NECK: Supple, no JVD  CHEST/LUNG: CTAB; No wheezes, rales, or rhonchi  HEART: Regular rate and rhythm. Normal S1/S2. No murmurs, rubs, or gallops  ABDOMEN: Soft, non-tender, non-distended; normal bowel sounds  EXTREMITIES:  2+ peripheral pulses b/l, No clubbing, cyanosis, or edema  NEUROLOGY: A&O x 3, no focal deficits  SKIN: upper extremity rash improving.   Lines: L groin IABP clean, dry and intact. s\p r femoral sheath site no hematoma    LABS:                          12.1   7.21  )-----------( 181      ( 21 Dec 2023 02:02 )             36.2         134<L>  |  104  |  32<H>  ----------------------------<  143<H>  4.3   |  19<L>  |  1.24    Ca    10.0      21 Dec 2023 02:02  Phos  3.9       Mg     1.9         TPro  6.9  /  Alb  3.9  /  TBili  0.2  /  DBili  x   /  AST  22  /  ALT  67<H>  /  AlkPhos  63      LIVER FUNCTIONS - ( 21 Dec 2023 02:02 )  Alb: 3.9 g/dL / Pro: 6.9 g/dL / ALK PHOS: 63 U/L / ALT: 67 U/L / AST: 22 U/L / GGT: x           PT/INR - ( 21 Dec 2023 02:02 )   PT: 12.0 sec;   INR: 1.15 ratio         PTT - ( 21 Dec 2023 02:02 )  PTT:79.1 sec        Urinalysis Basic - ( 21 Dec 2023 02:02 )    Color: x / Appearance: x / SG: x / pH: x  Gluc: 143 mg/dL / Ketone: x  / Bili: x / Urobili: x   Blood: x / Protein: x / Nitrite: x   Leuk Esterase: x / RBC: x / WBC x   Sq Epi: x / Non Sq Epi: x / Bacteria: x           PATIENT:  DEVORAH VALENCIA  [id number]    CHIEF COMPLAINT:  Patient is a 58y old  Male who presents with a chief complaint of NSTEMI, ADHF (21 Dec 2023 08:05)      INTERVAL HISTORY/OVERNIGHT EVENTS:  - left IABP placed yesterday  -rt fem groin sheath removed    REVIEW OF SYSTEMS:  GENERAL: No fever or chills  EYES: No change in vision  HEENT: No trouble swallowing or speaking  CARDIAC: No chest pain  PULMONARY: No cough or SOB  GI: No abdominal pain, no nausea or no vomiting, no diarrhea or constipation  : No changes in urination  SKIN: No rashes  NEURO: No headache, no numbness  MSK: No joint pain    MEDICATIONS:  MEDICATIONS  (STANDING):  aspirin  chewable 81 milliGRAM(s) Oral daily  atorvastatin 80 milliGRAM(s) Oral at bedtime  bisacodyl 5 milliGRAM(s) Oral every 12 hours  budesonide  80 MICROgram(s)/formoterol 4.5 MICROgram(s) Inhaler 2 Puff(s) Inhalation two times a day  carvedilol 25 milliGRAM(s) Oral every 12 hours  chlorhexidine 2% Cloths 1 Application(s) Topical daily  heparin  Infusion 1300 Unit(s)/Hr (14 mL/Hr) IV Continuous <Continuous>  hepatitis B Vaccine - Adult (HEPLISAV-B) 20 MICROGram(s) IntraMuscular once  hydrALAZINE 50 milliGRAM(s) Oral every 8 hours  insulin glargine Injectable (LANTUS) 12 Unit(s) SubCutaneous at bedtime  insulin lispro (ADMELOG) corrective regimen sliding scale   SubCutaneous three times a day before meals  insulin lispro (ADMELOG) corrective regimen sliding scale   SubCutaneous at bedtime  insulin lispro Injectable (ADMELOG) 9 Unit(s) SubCutaneous three times a day before meals  isosorbide   dinitrate Tablet (ISORDIL) 30 milliGRAM(s) Oral three times a day  polyethylene glycol 3350 17 Gram(s) Oral two times a day  senna 2 Tablet(s) Oral at bedtime    MEDICATIONS  (PRN):  hydrOXYzine hydrochloride 25 milliGRAM(s) Oral two times a day PRN Anxiety      ALLERGIES:  Allergies    penicillins (Unknown)    Intolerances        OBJECTIVE:  ICU Vital Signs Last 24 Hrs  T(C): 36.7 (22 Dec 2023 04:00), Max: 37 (21 Dec 2023 20:00)  T(F): 98.1 (22 Dec 2023 04:00), Max: 98.6 (21 Dec 2023 20:00)  HR: 76 (22 Dec 2023 07:00) (73 - 87)  BP: --  BP(mean): --  ABP: --  ABP(mean): --  RR: 18 (22 Dec 2023 07:00) (16 - 26)  SpO2: 98% (22 Dec 2023 07:00) (95% - 100%)    O2 Parameters below as of 22 Dec 2023 07:00  Patient On (Oxygen Delivery Method): room air    CAPILLARY BLOOD GLUCOSE      POCT Blood Glucose.: 139 mg/dL (21 Dec 2023 21:15)  POCT Blood Glucose.: 100 mg/dL (21 Dec 2023 18:18)  POCT Blood Glucose.: 146 mg/dL (21 Dec 2023 12:08)  POCT Blood Glucose.: 112 mg/dL (21 Dec 2023 08:11)    CAPILLARY BLOOD GLUCOSE      POCT Blood Glucose.: 139 mg/dL (21 Dec 2023 21:15)    I&O's Summary    21 Dec 2023 07:01  -  22 Dec 2023 07:00  --------------------------------------------------------  IN: 796 mL / OUT: 1275 mL / NET: -479 mL      Daily     Daily Weight in k.2 (22 Dec 2023 06:00)    PHYSICAL EXAMINATION:  GENERAL: No acute distress, well-developed  HEAD:  Atraumatic, Normocephalic  EYES: EOMI, PERRLA, conjunctiva and sclera clear  NECK: Supple, no JVD  CHEST/LUNG: CTAB; No wheezes, rales, or rhonchi  HEART: Regular rate and rhythm. Normal S1/S2. No murmurs, rubs, or gallops  ABDOMEN: Soft, non-tender, non-distended; normal bowel sounds  EXTREMITIES:  2+ peripheral pulses b/l, No clubbing, cyanosis, or edema  NEUROLOGY: A&O x 3, no focal deficits  SKIN: upper extremity rash improving.   Lines: L groin IABP clean, dry and intact. s\p r femoral sheath site no hematoma    LABS:                          12.1   7.21  )-----------( 181      ( 21 Dec 2023 02:02 )             36.2         134<L>  |  104  |  32<H>  ----------------------------<  143<H>  4.3   |  19<L>  |  1.24    Ca    10.0      21 Dec 2023 02:02  Phos  3.9       Mg     1.9         TPro  6.9  /  Alb  3.9  /  TBili  0.2  /  DBili  x   /  AST  22  /  ALT  67<H>  /  AlkPhos  63      LIVER FUNCTIONS - ( 21 Dec 2023 02:02 )  Alb: 3.9 g/dL / Pro: 6.9 g/dL / ALK PHOS: 63 U/L / ALT: 67 U/L / AST: 22 U/L / GGT: x           PT/INR - ( 21 Dec 2023 02:02 )   PT: 12.0 sec;   INR: 1.15 ratio         PTT - ( 21 Dec 2023 02:02 )  PTT:79.1 sec        Urinalysis Basic - ( 21 Dec 2023 02:02 )    Color: x / Appearance: x / SG: x / pH: x  Gluc: 143 mg/dL / Ketone: x  / Bili: x / Urobili: x   Blood: x / Protein: x / Nitrite: x   Leuk Esterase: x / RBC: x / WBC x   Sq Epi: x / Non Sq Epi: x / Bacteria: x           PATIENT:  DEVORAH VALENCIA  [id number]    CHIEF COMPLAINT:  Patient is a 58y old  Male who presents with a chief complaint of NSTEMI, ADHF (21 Dec 2023 08:05)      INTERVAL HISTORY/OVERNIGHT EVENTS:  - left IABP placed yesterday  - rt fem groin sheath removed    REVIEW OF SYSTEMS:  GENERAL: No fever or chills  EYES: No change in vision  HEENT: No trouble swallowing or speaking  CARDIAC: No chest pain  PULMONARY: No cough or SOB  GI: No abdominal pain, no nausea or no vomiting, no diarrhea or constipation  : No changes in urination  SKIN: No rashes  NEURO: No headache, no numbness  MSK: No joint pain    MEDICATIONS:  MEDICATIONS  (STANDING):  aspirin  chewable 81 milliGRAM(s) Oral daily  atorvastatin 80 milliGRAM(s) Oral at bedtime  bisacodyl 5 milliGRAM(s) Oral every 12 hours  budesonide  80 MICROgram(s)/formoterol 4.5 MICROgram(s) Inhaler 2 Puff(s) Inhalation two times a day  carvedilol 25 milliGRAM(s) Oral every 12 hours  chlorhexidine 2% Cloths 1 Application(s) Topical daily  heparin  Infusion 1300 Unit(s)/Hr (14 mL/Hr) IV Continuous <Continuous>  hepatitis B Vaccine - Adult (HEPLISAV-B) 20 MICROGram(s) IntraMuscular once  hydrALAZINE 50 milliGRAM(s) Oral every 8 hours  insulin glargine Injectable (LANTUS) 12 Unit(s) SubCutaneous at bedtime  insulin lispro (ADMELOG) corrective regimen sliding scale   SubCutaneous three times a day before meals  insulin lispro (ADMELOG) corrective regimen sliding scale   SubCutaneous at bedtime  insulin lispro Injectable (ADMELOG) 9 Unit(s) SubCutaneous three times a day before meals  isosorbide   dinitrate Tablet (ISORDIL) 30 milliGRAM(s) Oral three times a day  polyethylene glycol 3350 17 Gram(s) Oral two times a day  senna 2 Tablet(s) Oral at bedtime    MEDICATIONS  (PRN):  hydrOXYzine hydrochloride 25 milliGRAM(s) Oral two times a day PRN Anxiety      ALLERGIES:  Allergies    penicillins (Unknown)    Intolerances        OBJECTIVE:  ICU Vital Signs Last 24 Hrs  T(C): 36.7 (22 Dec 2023 04:00), Max: 37 (21 Dec 2023 20:00)  T(F): 98.1 (22 Dec 2023 04:00), Max: 98.6 (21 Dec 2023 20:00)  HR: 76 (22 Dec 2023 07:00) (73 - 87)  BP: --  BP(mean): --  ABP: --  ABP(mean): --  RR: 18 (22 Dec 2023 07:00) (16 - 26)  SpO2: 98% (22 Dec 2023 07:00) (95% - 100%)    O2 Parameters below as of 22 Dec 2023 07:00  Patient On (Oxygen Delivery Method): room air    CAPILLARY BLOOD GLUCOSE      POCT Blood Glucose.: 139 mg/dL (21 Dec 2023 21:15)  POCT Blood Glucose.: 100 mg/dL (21 Dec 2023 18:18)  POCT Blood Glucose.: 146 mg/dL (21 Dec 2023 12:08)  POCT Blood Glucose.: 112 mg/dL (21 Dec 2023 08:11)    CAPILLARY BLOOD GLUCOSE      POCT Blood Glucose.: 139 mg/dL (21 Dec 2023 21:15)    I&O's Summary    21 Dec 2023 07:01  -  22 Dec 2023 07:00  --------------------------------------------------------  IN: 796 mL / OUT: 1275 mL / NET: -479 mL      Daily     Daily Weight in k.2 (22 Dec 2023 06:00)    PHYSICAL EXAMINATION:  GENERAL: No acute distress, well-developed  HEAD:  Atraumatic, Normocephalic  EYES: EOMI, PERRLA, conjunctiva and sclera clear  NECK: Supple, no JVD  CHEST/LUNG: CTAB; No wheezes, rales, or rhonchi  HEART: Regular rate and rhythm. Normal S1/S2. No murmurs, rubs, or gallops  ABDOMEN: Soft, non-tender, non-distended; normal bowel sounds  EXTREMITIES:  2+ peripheral pulses b/l, No clubbing, cyanosis, or edema  NEUROLOGY: A&O x 3, no focal deficits  SKIN: upper extremity rash improving.   Lines: L groin IABP clean, dry and intact. s\p r femoral sheath site no hematoma    LABS:                          12.1   7.21  )-----------( 181      ( 21 Dec 2023 02:02 )             36.2         134<L>  |  104  |  32<H>  ----------------------------<  143<H>  4.3   |  19<L>  |  1.24    Ca    10.0      21 Dec 2023 02:02  Phos  3.9       Mg     1.9         TPro  6.9  /  Alb  3.9  /  TBili  0.2  /  DBili  x   /  AST  22  /  ALT  67<H>  /  AlkPhos  63      LIVER FUNCTIONS - ( 21 Dec 2023 02:02 )  Alb: 3.9 g/dL / Pro: 6.9 g/dL / ALK PHOS: 63 U/L / ALT: 67 U/L / AST: 22 U/L / GGT: x           PT/INR - ( 21 Dec 2023 02:02 )   PT: 12.0 sec;   INR: 1.15 ratio         PTT - ( 21 Dec 2023 02:02 )  PTT:79.1 sec        Urinalysis Basic - ( 21 Dec 2023 02:02 )    Color: x / Appearance: x / SG: x / pH: x  Gluc: 143 mg/dL / Ketone: x  / Bili: x / Urobili: x   Blood: x / Protein: x / Nitrite: x   Leuk Esterase: x / RBC: x / WBC x   Sq Epi: x / Non Sq Epi: x / Bacteria: x           PATIENT:  DEVORAH VALENCIA  [id number]    CHIEF COMPLAINT:  Patient is a 58y old  Male who presents with a chief complaint of NSTEMI, ADHF (21 Dec 2023 08:05)      INTERVAL HISTORY/OVERNIGHT EVENTS:  - left IABP placed yesterday  - rt fem groin sheath removed    REVIEW OF SYSTEMS:  GENERAL: No fever or chills  EYES: No change in vision  HEENT: No trouble swallowing or speaking  CARDIAC: No chest pain  PULMONARY: No cough or SOB  GI: No abdominal pain, no nausea or no vomiting, no diarrhea or constipation  : No changes in urination  SKIN: No rashes  NEURO: No headache, no numbness  MSK: No joint pain    MEDICATIONS:  MEDICATIONS  (STANDING):  aspirin  chewable 81 milliGRAM(s) Oral daily  atorvastatin 80 milliGRAM(s) Oral at bedtime  bisacodyl 5 milliGRAM(s) Oral every 12 hours  budesonide  80 MICROgram(s)/formoterol 4.5 MICROgram(s) Inhaler 2 Puff(s) Inhalation two times a day  carvedilol 25 milliGRAM(s) Oral every 12 hours  chlorhexidine 2% Cloths 1 Application(s) Topical daily  heparin  Infusion 1300 Unit(s)/Hr (14 mL/Hr) IV Continuous <Continuous>  hepatitis B Vaccine - Adult (HEPLISAV-B) 20 MICROGram(s) IntraMuscular once  hydrALAZINE 50 milliGRAM(s) Oral every 8 hours  insulin glargine Injectable (LANTUS) 12 Unit(s) SubCutaneous at bedtime  insulin lispro (ADMELOG) corrective regimen sliding scale   SubCutaneous three times a day before meals  insulin lispro (ADMELOG) corrective regimen sliding scale   SubCutaneous at bedtime  insulin lispro Injectable (ADMELOG) 9 Unit(s) SubCutaneous three times a day before meals  isosorbide   dinitrate Tablet (ISORDIL) 30 milliGRAM(s) Oral three times a day  polyethylene glycol 3350 17 Gram(s) Oral two times a day  senna 2 Tablet(s) Oral at bedtime    MEDICATIONS  (PRN):  hydrOXYzine hydrochloride 25 milliGRAM(s) Oral two times a day PRN Anxiety      ALLERGIES:  Allergies    penicillins (Unknown)    Intolerances        OBJECTIVE:  ICU Vital Signs Last 24 Hrs  T(C): 36.7 (22 Dec 2023 04:00), Max: 37 (21 Dec 2023 20:00)  T(F): 98.1 (22 Dec 2023 04:00), Max: 98.6 (21 Dec 2023 20:00)  HR: 76 (22 Dec 2023 07:00) (73 - 87)  BP: --  BP(mean): --  ABP: --  ABP(mean): --  RR: 18 (22 Dec 2023 07:00) (16 - 26)  SpO2: 98% (22 Dec 2023 07:00) (95% - 100%)    O2 Parameters below as of 22 Dec 2023 07:00  Patient On (Oxygen Delivery Method): room air    CAPILLARY BLOOD GLUCOSE      POCT Blood Glucose.: 139 mg/dL (21 Dec 2023 21:15)  POCT Blood Glucose.: 100 mg/dL (21 Dec 2023 18:18)  POCT Blood Glucose.: 146 mg/dL (21 Dec 2023 12:08)  POCT Blood Glucose.: 112 mg/dL (21 Dec 2023 08:11)    CAPILLARY BLOOD GLUCOSE      POCT Blood Glucose.: 139 mg/dL (21 Dec 2023 21:15)    I&O's Summary    21 Dec 2023 07:01  -  22 Dec 2023 07:00  --------------------------------------------------------  IN: 796 mL / OUT: 1275 mL / NET: -479 mL      Daily     Daily Weight in k.2 (22 Dec 2023 06:00)    PHYSICAL EXAMINATION:  GENERAL: No acute distress, well-developed  HEAD:  Atraumatic, Normocephalic  EYES: EOMI, PERRLA, conjunctiva and sclera clear  NECK: Supple, no JVD  CHEST/LUNG: CTAB; No wheezes, rales, or rhonchi  HEART: Regular rate and rhythm. Normal S1/S2. No murmurs, rubs, or gallops  ABDOMEN: Soft, non-tender, non-distended; normal bowel sounds  EXTREMITIES:  2+ peripheral pulses b/l, No clubbing, cyanosis, or edema  NEUROLOGY: A&O x 3, no focal deficits  SKIN: upper extremity rash improving.   Lines: L groin IABP clean, dry and intact. s\p r femoral sheath site no hematoma    LABS:                          12.1   7.21  )-----------( 181      ( 21 Dec 2023 02:02 )             36.2         134<L>  |  104  |  32<H>  ----------------------------<  143<H>  4.3   |  19<L>  |  1.24    Ca    10.0      21 Dec 2023 02:02  Phos  3.9       Mg     1.9         TPro  6.9  /  Alb  3.9  /  TBili  0.2  /  DBili  x   /  AST  22  /  ALT  67<H>  /  AlkPhos  63      LIVER FUNCTIONS - ( 21 Dec 2023 02:02 )  Alb: 3.9 g/dL / Pro: 6.9 g/dL / ALK PHOS: 63 U/L / ALT: 67 U/L / AST: 22 U/L / GGT: x           PT/INR - ( 21 Dec 2023 02:02 )   PT: 12.0 sec;   INR: 1.15 ratio         PTT - ( 21 Dec 2023 02:02 )  PTT:79.1 sec        Urinalysis Basic - ( 21 Dec 2023 02:02 )    Color: x / Appearance: x / SG: x / pH: x  Gluc: 143 mg/dL / Ketone: x  / Bili: x / Urobili: x   Blood: x / Protein: x / Nitrite: x   Leuk Esterase: x / RBC: x / WBC x   Sq Epi: x / Non Sq Epi: x / Bacteria: x           PATIENT:  DEVORAH VALENCIA  [id number]    CHIEF COMPLAINT:  Patient is a 58y old  Male who presents with a chief complaint of NSTEMI, ADHF (21 Dec 2023 08:05)      INTERVAL HISTORY/OVERNIGHT EVENTS:  - left IABP placed yesterday  - rt fem groin sheath removed    REVIEW OF SYSTEMS:  GENERAL: No fever or chills  EYES: No change in vision  HEENT: No trouble swallowing or speaking  CARDIAC: No chest pain  PULMONARY: No cough or SOB  GI: No abdominal pain, no nausea or no vomiting  : No changes in urination  SKIN: No rashes  NEURO: No headache, no numbness  MSK: No joint pain    MEDICATIONS:  MEDICATIONS  (STANDING):  aspirin  chewable 81 milliGRAM(s) Oral daily  atorvastatin 80 milliGRAM(s) Oral at bedtime  bisacodyl 5 milliGRAM(s) Oral every 12 hours  budesonide  80 MICROgram(s)/formoterol 4.5 MICROgram(s) Inhaler 2 Puff(s) Inhalation two times a day  carvedilol 25 milliGRAM(s) Oral every 12 hours  chlorhexidine 2% Cloths 1 Application(s) Topical daily  heparin  Infusion 1300 Unit(s)/Hr (14 mL/Hr) IV Continuous <Continuous>  hepatitis B Vaccine - Adult (HEPLISAV-B) 20 MICROGram(s) IntraMuscular once  hydrALAZINE 50 milliGRAM(s) Oral every 8 hours  insulin glargine Injectable (LANTUS) 12 Unit(s) SubCutaneous at bedtime  insulin lispro (ADMELOG) corrective regimen sliding scale   SubCutaneous three times a day before meals  insulin lispro (ADMELOG) corrective regimen sliding scale   SubCutaneous at bedtime  insulin lispro Injectable (ADMELOG) 9 Unit(s) SubCutaneous three times a day before meals  isosorbide   dinitrate Tablet (ISORDIL) 30 milliGRAM(s) Oral three times a day  polyethylene glycol 3350 17 Gram(s) Oral two times a day  senna 2 Tablet(s) Oral at bedtime    MEDICATIONS  (PRN):  hydrOXYzine hydrochloride 25 milliGRAM(s) Oral two times a day PRN Anxiety      ALLERGIES:  Allergies    penicillins (Unknown)    Intolerances        OBJECTIVE:  ICU Vital Signs Last 24 Hrs  T(C): 36.7 (22 Dec 2023 04:00), Max: 37 (21 Dec 2023 20:00)  T(F): 98.1 (22 Dec 2023 04:00), Max: 98.6 (21 Dec 2023 20:00)  HR: 76 (22 Dec 2023 07:00) (73 - 87)  BP: --  BP(mean): --  ABP: --  ABP(mean): --  RR: 18 (22 Dec 2023 07:00) (16 - 26)  SpO2: 98% (22 Dec 2023 07:00) (95% - 100%)    O2 Parameters below as of 22 Dec 2023 07:00  Patient On (Oxygen Delivery Method): room air    CAPILLARY BLOOD GLUCOSE      POCT Blood Glucose.: 139 mg/dL (21 Dec 2023 21:15)  POCT Blood Glucose.: 100 mg/dL (21 Dec 2023 18:18)  POCT Blood Glucose.: 146 mg/dL (21 Dec 2023 12:08)  POCT Blood Glucose.: 112 mg/dL (21 Dec 2023 08:11)    CAPILLARY BLOOD GLUCOSE      POCT Blood Glucose.: 139 mg/dL (21 Dec 2023 21:15)    I&O's Summary    21 Dec 2023 07:01  -  22 Dec 2023 07:00  --------------------------------------------------------  IN: 796 mL / OUT: 1275 mL / NET: -479 mL      Daily     Daily Weight in k.2 (22 Dec 2023 06:00)    PHYSICAL EXAMINATION:  GENERAL: No acute distress, well-developed  HEAD:  Atraumatic, Normocephalic  EYES: EOMI, PERRLA, conjunctiva and sclera clear  NECK: no JVD  CHEST/LUNG: CTAB; No wheezes, rales, or rhonchi  HEART: Regular rate and rhythm. Normal S1/S2  ABDOMEN: Soft, non-tender, non-distended; normal bowel sounds  EXTREMITIES:  2+ peripheral pulses b/l, No clubbing, cyanosis, or edema  NEUROLOGY: A&O x 3, no focal deficits  Lines: L groin IABP clean, dry and intact. s\p r femoral sheath site no hematoma    LABS:                          12.1   7.21  )-----------( 181      ( 21 Dec 2023 02:02 )             36.2     12-    134<L>  |  104  |  32<H>  ----------------------------<  143<H>  4.3   |  19<L>  |  1.24    Ca    10.0      21 Dec 2023 02:02  Phos  3.9     12-  Mg     1.9     12-    TPro  6.9  /  Alb  3.9  /  TBili  0.2  /  DBili  x   /  AST  22  /  ALT  67<H>  /  AlkPhos  63  12-    LIVER FUNCTIONS - ( 21 Dec 2023 02:02 )  Alb: 3.9 g/dL / Pro: 6.9 g/dL / ALK PHOS: 63 U/L / ALT: 67 U/L / AST: 22 U/L / GGT: x           PT/INR - ( 21 Dec 2023 02:02 )   PT: 12.0 sec;   INR: 1.15 ratio         PTT - ( 21 Dec 2023 02:02 )  PTT:79.1 sec        Urinalysis Basic - ( 21 Dec 2023 02:02 )    Color: x / Appearance: x / SG: x / pH: x  Gluc: 143 mg/dL / Ketone: x  / Bili: x / Urobili: x   Blood: x / Protein: x / Nitrite: x   Leuk Esterase: x / RBC: x / WBC x   Sq Epi: x / Non Sq Epi: x / Bacteria: x           PATIENT:  DEVORAH VALENCIA  [id number]    CHIEF COMPLAINT:  Patient is a 58y old  Male who presents with a chief complaint of NSTEMI, ADHF (21 Dec 2023 08:05)      INTERVAL HISTORY/OVERNIGHT EVENTS:  - left IABP placed yesterday  - rt fem groin sheath removed    REVIEW OF SYSTEMS:  GENERAL: No fever or chills  EYES: No change in vision  HEENT: No trouble swallowing or speaking  CARDIAC: No chest pain  PULMONARY: No cough or SOB  GI: No abdominal pain, no nausea or no vomiting  : No changes in urination  SKIN: No rashes  NEURO: No headache, no numbness  MSK: No joint pain    MEDICATIONS:  MEDICATIONS  (STANDING):  aspirin  chewable 81 milliGRAM(s) Oral daily  atorvastatin 80 milliGRAM(s) Oral at bedtime  bisacodyl 5 milliGRAM(s) Oral every 12 hours  budesonide  80 MICROgram(s)/formoterol 4.5 MICROgram(s) Inhaler 2 Puff(s) Inhalation two times a day  carvedilol 25 milliGRAM(s) Oral every 12 hours  chlorhexidine 2% Cloths 1 Application(s) Topical daily  heparin  Infusion 1300 Unit(s)/Hr (14 mL/Hr) IV Continuous <Continuous>  hepatitis B Vaccine - Adult (HEPLISAV-B) 20 MICROGram(s) IntraMuscular once  hydrALAZINE 50 milliGRAM(s) Oral every 8 hours  insulin glargine Injectable (LANTUS) 12 Unit(s) SubCutaneous at bedtime  insulin lispro (ADMELOG) corrective regimen sliding scale   SubCutaneous three times a day before meals  insulin lispro (ADMELOG) corrective regimen sliding scale   SubCutaneous at bedtime  insulin lispro Injectable (ADMELOG) 9 Unit(s) SubCutaneous three times a day before meals  isosorbide   dinitrate Tablet (ISORDIL) 30 milliGRAM(s) Oral three times a day  polyethylene glycol 3350 17 Gram(s) Oral two times a day  senna 2 Tablet(s) Oral at bedtime    MEDICATIONS  (PRN):  hydrOXYzine hydrochloride 25 milliGRAM(s) Oral two times a day PRN Anxiety      ALLERGIES:  Allergies    penicillins (Unknown)    Intolerances        OBJECTIVE:  ICU Vital Signs Last 24 Hrs  T(C): 36.7 (22 Dec 2023 04:00), Max: 37 (21 Dec 2023 20:00)  T(F): 98.1 (22 Dec 2023 04:00), Max: 98.6 (21 Dec 2023 20:00)  HR: 76 (22 Dec 2023 07:00) (73 - 87)  BP: --  BP(mean): --  ABP: --  ABP(mean): --  RR: 18 (22 Dec 2023 07:00) (16 - 26)  SpO2: 98% (22 Dec 2023 07:00) (95% - 100%)    O2 Parameters below as of 22 Dec 2023 07:00  Patient On (Oxygen Delivery Method): room air    CAPILLARY BLOOD GLUCOSE      POCT Blood Glucose.: 139 mg/dL (21 Dec 2023 21:15)  POCT Blood Glucose.: 100 mg/dL (21 Dec 2023 18:18)  POCT Blood Glucose.: 146 mg/dL (21 Dec 2023 12:08)  POCT Blood Glucose.: 112 mg/dL (21 Dec 2023 08:11)    CAPILLARY BLOOD GLUCOSE      POCT Blood Glucose.: 139 mg/dL (21 Dec 2023 21:15)    I&O's Summary    21 Dec 2023 07:01  -  22 Dec 2023 07:00  --------------------------------------------------------  IN: 796 mL / OUT: 1275 mL / NET: -479 mL      Daily     Daily Weight in k.2 (22 Dec 2023 06:00)    PHYSICAL EXAMINATION:  GENERAL: No acute distress, well-developed  HEAD:  Atraumatic, Normocephalic  EYES: EOMI, PERRLA, conjunctiva and sclera clear  NECK: no JVD  CHEST/LUNG: CTAB; No wheezes, rales, or rhonchi  HEART: Regular rate and rhythm. Normal S1/S2  ABDOMEN: Soft, non-tender, non-distended; normal bowel sounds  EXTREMITIES:  2+ peripheral pulses b/l, No clubbing, cyanosis, or edema  NEUROLOGY: A&O x 3, no focal deficits  Lines: L groin IABP clean, dry and intact. s\p r femoral sheath site no hematoma    LABS:                          12.1   7.21  )-----------( 181      ( 21 Dec 2023 02:02 )             36.2     12-    134<L>  |  104  |  32<H>  ----------------------------<  143<H>  4.3   |  19<L>  |  1.24    Ca    10.0      21 Dec 2023 02:02  Phos  3.9     12-  Mg     1.9     12-    TPro  6.9  /  Alb  3.9  /  TBili  0.2  /  DBili  x   /  AST  22  /  ALT  67<H>  /  AlkPhos  63  12-    LIVER FUNCTIONS - ( 21 Dec 2023 02:02 )  Alb: 3.9 g/dL / Pro: 6.9 g/dL / ALK PHOS: 63 U/L / ALT: 67 U/L / AST: 22 U/L / GGT: x           PT/INR - ( 21 Dec 2023 02:02 )   PT: 12.0 sec;   INR: 1.15 ratio         PTT - ( 21 Dec 2023 02:02 )  PTT:79.1 sec        Urinalysis Basic - ( 21 Dec 2023 02:02 )    Color: x / Appearance: x / SG: x / pH: x  Gluc: 143 mg/dL / Ketone: x  / Bili: x / Urobili: x   Blood: x / Protein: x / Nitrite: x   Leuk Esterase: x / RBC: x / WBC x   Sq Epi: x / Non Sq Epi: x / Bacteria: x

## 2023-12-22 NOTE — PROGRESS NOTE ADULT - ASSESSMENT
57 yo M with HFrEF (LVIDd 6.4 cm, LVEF 32%), CAD s/p PCI (2008), HTN, DMT2 (A1c 8.3%) and CVA s/p TPA (2018), recently treated for PNA who initially presented to [facility] via EMS after syncope reportedly requiring defibrilation. Treated for ACS but left AMA to come to [facility]. On arrival ECG with ST depression in lateral leads. Intubated 11/1 for respiratory failure. Found to have COVID. L/RHC 11/1revealing  of LAD and RCA, elevated filling pressures and CI of 1.5 prompting placement of IABP. Extubated 11/3. IABP was weaned to off 11/7, then the following day on 11/8, developed PMVT cardiac arrest with prompt CPR and defibrillation, started on IV Lidocaine and Amio. IABP ultimately replaced on 11/9 for worsening hypotension. TTE 11/11 revealing LV thrombus. Mild transaminitis. Now off amio.    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 AT evaluation launched 11/10, he is ABO A. He was discussed during TSC on 11/16 and was approved for listing, however was found to be Bacteremic with 11/17 Blc Cx growing mixed cultures Staph epi and Enterococcus faecalis and started on IV Vanco. Repeat cultures from 11/18 reveal NGTD and therefore was cleared by ID for listing.     GM + rods (Cutibacterium) in blood culture on 11/30 (reported on 12/4), restarted on vancomycin, and status 7, repeat cultures negative x48 hours (12/6).    Currently listed Status 2, ABO A, PRA 0% (12/12).    He currently appears euvolemic He appears adequately supported on IABP at 1:1, electrically quiescent on oral Amiodarone. Cr is normal. IABP changed to LFA 12/21. Awaiting suitable donor.     Cardiac Studies  11/21/23 TTE: limited unable to rule out endocarditis, technically difficult study  11/1123 TTE: LVEF 22% (global), LV thrombus, normal RV size and function, mild MR, trace TR  11/1/23  LHC showed pLAD 70 % stenosis in the ostium portion of the segment and 100 % in-stent restenosis and in mRCA, 100 % stenosis.   11/1/23 RHC; RA 23 Vw 24, PA 52/33/40, PCWP 30 Vw 33, AO 85/66/73, PA sat 54.4%, CO/CI (F) 2.96/1.44, IABP was placed during the cath through R common femoral artery.   11/1/23 TTE: LVIDd 6.4cm , LVEF 32%, regional WMA, grade II DD,  TAPSE 1.7cm, mld MR, trace TR,      57 yo M with HFrEF (LVIDd 6.4 cm, LVEF 32%), CAD s/p PCI (2008), HTN, DMT2 (A1c 8.3%) and CVA s/p TPA (2018), recently treated for PNA who initially presented to [facility] via EMS after syncope reportedly requiring defibrilation. Treated for ACS but left AMA to come to [facility]. On arrival ECG with ST depression in lateral leads. Intubated 11/1 for respiratory failure. Found to have COVID. L/RHC 11/1revealing  of LAD and RCA, elevated filling pressures and CI of 1.5 prompting placement of IABP. Extubated 11/3. IABP was weaned to off 11/7, then the following day on 11/8, developed PMVT cardiac arrest with prompt CPR and defibrillation, started on IV Lidocaine and Amio. IABP ultimately replaced on 11/9 for worsening hypotension. TTE 11/11 revealing LV thrombus. Mild transaminitis. Now off amio.    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 AT evaluation launched 11/10, he is ABO A. He was discussed during TSC on 11/16 and was approved for listing, however was found to be Bacteremic with 11/17 Blc Cx growing mixed cultures Staph epi and Enterococcus faecalis and started on IV Vanco. Repeat cultures from 11/18 reveal NGTD and therefore was cleared by ID for listing.     GM + rods (Cutibacterium) in blood culture on 11/30 (reported on 12/4), restarted on vancomycin, and status 7, repeat cultures negative x48 hours (12/6).    Currently listed Status 2, ABO A, PRA 0% (12/12).    He currently appears euvolemic He appears adequately supported on IABP at 1:1, electrically quiescent on oral Amiodarone. Cr is normal. IABP changed to LFA 12/21. Awaiting suitable donor.     Cardiac Studies  11/21/23 TTE: limited unable to rule out endocarditis, technically difficult study  11/1123 TTE: LVEF 22% (global), LV thrombus, normal RV size and function, mild MR, trace TR  11/1/23  LHC showed pLAD 70 % stenosis in the ostium portion of the segment and 100 % in-stent restenosis and in mRCA, 100 % stenosis.   11/1/23 RHC; RA 23 Vw 24, PA 52/33/40, PCWP 30 Vw 33, AO 85/66/73, PA sat 54.4%, CO/CI (F) 2.96/1.44, IABP was placed during the cath through R common femoral artery.   11/1/23 TTE: LVIDd 6.4cm , LVEF 32%, regional WMA, grade II DD,  TAPSE 1.7cm, mld MR, trace TR,

## 2023-12-22 NOTE — PROGRESS NOTE ADULT - ATTENDING COMMENTS
57yo M with ischemic HFrEF, ADHF and cardiogenic shock awaiting heart transplant (status 2)    Neuro: no deficits, prn atarax for anxiety  Pulm: sating well on RA, prn albuterol and symbicort  CV: cardiogenic shock on IABP 1:1, cont afterload reduction with coreg, hydral, isordil  Renal: creat stable, no issues  GI: DASH diet  Heme: H/H stable, on heparin gtt for LV thrombus and IABP.   ID: no active issues  Endo: BG controlled  Lines: LFA IABP (12/21), RRA (12/14)     no labs today 59yo M with ischemic HFrEF, ADHF and cardiogenic shock awaiting heart transplant (status 2)    Neuro: no deficits, prn atarax for anxiety  Pulm: sating well on RA, prn albuterol and symbicort  CV: cardiogenic shock on IABP 1:1, cont afterload reduction with coreg, hydral, isordil  Renal: creat stable, no issues  GI: DASH diet  Heme: H/H stable, on heparin gtt for LV thrombus and IABP.   ID: no active issues  Endo: BG controlled  Lines: LFA IABP (12/21), RRA (12/14)     no labs today

## 2023-12-23 LAB
ALBUMIN SERPL ELPH-MCNC: 4 G/DL — SIGNIFICANT CHANGE UP (ref 3.3–5)
ALBUMIN SERPL ELPH-MCNC: 4 G/DL — SIGNIFICANT CHANGE UP (ref 3.3–5)
ALP SERPL-CCNC: 63 U/L — SIGNIFICANT CHANGE UP (ref 40–120)
ALP SERPL-CCNC: 63 U/L — SIGNIFICANT CHANGE UP (ref 40–120)
ALT FLD-CCNC: 65 U/L — HIGH (ref 10–45)
ALT FLD-CCNC: 65 U/L — HIGH (ref 10–45)
ANION GAP SERPL CALC-SCNC: 9 MMOL/L — SIGNIFICANT CHANGE UP (ref 5–17)
ANION GAP SERPL CALC-SCNC: 9 MMOL/L — SIGNIFICANT CHANGE UP (ref 5–17)
APTT BLD: 70.7 SEC — HIGH (ref 24.5–35.6)
APTT BLD: 70.7 SEC — HIGH (ref 24.5–35.6)
AST SERPL-CCNC: 25 U/L — SIGNIFICANT CHANGE UP (ref 10–40)
AST SERPL-CCNC: 25 U/L — SIGNIFICANT CHANGE UP (ref 10–40)
BASOPHILS # BLD AUTO: 0.05 K/UL — SIGNIFICANT CHANGE UP (ref 0–0.2)
BASOPHILS # BLD AUTO: 0.05 K/UL — SIGNIFICANT CHANGE UP (ref 0–0.2)
BASOPHILS NFR BLD AUTO: 0.6 % — SIGNIFICANT CHANGE UP (ref 0–2)
BASOPHILS NFR BLD AUTO: 0.6 % — SIGNIFICANT CHANGE UP (ref 0–2)
BILIRUB SERPL-MCNC: 0.3 MG/DL — SIGNIFICANT CHANGE UP (ref 0.2–1.2)
BILIRUB SERPL-MCNC: 0.3 MG/DL — SIGNIFICANT CHANGE UP (ref 0.2–1.2)
BLD GP AB SCN SERPL QL: NEGATIVE — SIGNIFICANT CHANGE UP
BLD GP AB SCN SERPL QL: NEGATIVE — SIGNIFICANT CHANGE UP
BUN SERPL-MCNC: 30 MG/DL — HIGH (ref 7–23)
BUN SERPL-MCNC: 30 MG/DL — HIGH (ref 7–23)
CALCIUM SERPL-MCNC: 10 MG/DL — SIGNIFICANT CHANGE UP (ref 8.4–10.5)
CALCIUM SERPL-MCNC: 10 MG/DL — SIGNIFICANT CHANGE UP (ref 8.4–10.5)
CHLORIDE SERPL-SCNC: 105 MMOL/L — SIGNIFICANT CHANGE UP (ref 96–108)
CHLORIDE SERPL-SCNC: 105 MMOL/L — SIGNIFICANT CHANGE UP (ref 96–108)
CO2 SERPL-SCNC: 19 MMOL/L — LOW (ref 22–31)
CO2 SERPL-SCNC: 19 MMOL/L — LOW (ref 22–31)
CREAT SERPL-MCNC: 1.18 MG/DL — SIGNIFICANT CHANGE UP (ref 0.5–1.3)
CREAT SERPL-MCNC: 1.18 MG/DL — SIGNIFICANT CHANGE UP (ref 0.5–1.3)
EGFR: 71 ML/MIN/1.73M2 — SIGNIFICANT CHANGE UP
EGFR: 71 ML/MIN/1.73M2 — SIGNIFICANT CHANGE UP
EOSINOPHIL # BLD AUTO: 0.58 K/UL — HIGH (ref 0–0.5)
EOSINOPHIL # BLD AUTO: 0.58 K/UL — HIGH (ref 0–0.5)
EOSINOPHIL NFR BLD AUTO: 6.8 % — HIGH (ref 0–6)
EOSINOPHIL NFR BLD AUTO: 6.8 % — HIGH (ref 0–6)
GLUCOSE BLDC GLUCOMTR-MCNC: 114 MG/DL — HIGH (ref 70–99)
GLUCOSE BLDC GLUCOMTR-MCNC: 114 MG/DL — HIGH (ref 70–99)
GLUCOSE BLDC GLUCOMTR-MCNC: 129 MG/DL — HIGH (ref 70–99)
GLUCOSE BLDC GLUCOMTR-MCNC: 129 MG/DL — HIGH (ref 70–99)
GLUCOSE BLDC GLUCOMTR-MCNC: 143 MG/DL — HIGH (ref 70–99)
GLUCOSE BLDC GLUCOMTR-MCNC: 143 MG/DL — HIGH (ref 70–99)
GLUCOSE BLDC GLUCOMTR-MCNC: 170 MG/DL — HIGH (ref 70–99)
GLUCOSE BLDC GLUCOMTR-MCNC: 170 MG/DL — HIGH (ref 70–99)
GLUCOSE SERPL-MCNC: 131 MG/DL — HIGH (ref 70–99)
GLUCOSE SERPL-MCNC: 131 MG/DL — HIGH (ref 70–99)
HCT VFR BLD CALC: 37.7 % — LOW (ref 39–50)
HCT VFR BLD CALC: 37.7 % — LOW (ref 39–50)
HGB BLD-MCNC: 12.6 G/DL — LOW (ref 13–17)
HGB BLD-MCNC: 12.6 G/DL — LOW (ref 13–17)
IMM GRANULOCYTES NFR BLD AUTO: 0.4 % — SIGNIFICANT CHANGE UP (ref 0–0.9)
IMM GRANULOCYTES NFR BLD AUTO: 0.4 % — SIGNIFICANT CHANGE UP (ref 0–0.9)
INR BLD: 1.15 RATIO — SIGNIFICANT CHANGE UP (ref 0.85–1.18)
INR BLD: 1.15 RATIO — SIGNIFICANT CHANGE UP (ref 0.85–1.18)
LACTATE SERPL-SCNC: 0.6 MMOL/L — SIGNIFICANT CHANGE UP (ref 0.5–2)
LACTATE SERPL-SCNC: 0.6 MMOL/L — SIGNIFICANT CHANGE UP (ref 0.5–2)
LYMPHOCYTES # BLD AUTO: 1.64 K/UL — SIGNIFICANT CHANGE UP (ref 1–3.3)
LYMPHOCYTES # BLD AUTO: 1.64 K/UL — SIGNIFICANT CHANGE UP (ref 1–3.3)
LYMPHOCYTES # BLD AUTO: 19.3 % — SIGNIFICANT CHANGE UP (ref 13–44)
LYMPHOCYTES # BLD AUTO: 19.3 % — SIGNIFICANT CHANGE UP (ref 13–44)
MAGNESIUM SERPL-MCNC: 1.8 MG/DL — SIGNIFICANT CHANGE UP (ref 1.6–2.6)
MAGNESIUM SERPL-MCNC: 1.8 MG/DL — SIGNIFICANT CHANGE UP (ref 1.6–2.6)
MCHC RBC-ENTMCNC: 29.9 PG — SIGNIFICANT CHANGE UP (ref 27–34)
MCHC RBC-ENTMCNC: 29.9 PG — SIGNIFICANT CHANGE UP (ref 27–34)
MCHC RBC-ENTMCNC: 33.4 GM/DL — SIGNIFICANT CHANGE UP (ref 32–36)
MCHC RBC-ENTMCNC: 33.4 GM/DL — SIGNIFICANT CHANGE UP (ref 32–36)
MCV RBC AUTO: 89.5 FL — SIGNIFICANT CHANGE UP (ref 80–100)
MCV RBC AUTO: 89.5 FL — SIGNIFICANT CHANGE UP (ref 80–100)
MONOCYTES # BLD AUTO: 0.71 K/UL — SIGNIFICANT CHANGE UP (ref 0–0.9)
MONOCYTES # BLD AUTO: 0.71 K/UL — SIGNIFICANT CHANGE UP (ref 0–0.9)
MONOCYTES NFR BLD AUTO: 8.4 % — SIGNIFICANT CHANGE UP (ref 2–14)
MONOCYTES NFR BLD AUTO: 8.4 % — SIGNIFICANT CHANGE UP (ref 2–14)
NEUTROPHILS # BLD AUTO: 5.47 K/UL — SIGNIFICANT CHANGE UP (ref 1.8–7.4)
NEUTROPHILS # BLD AUTO: 5.47 K/UL — SIGNIFICANT CHANGE UP (ref 1.8–7.4)
NEUTROPHILS NFR BLD AUTO: 64.5 % — SIGNIFICANT CHANGE UP (ref 43–77)
NEUTROPHILS NFR BLD AUTO: 64.5 % — SIGNIFICANT CHANGE UP (ref 43–77)
NRBC # BLD: 0 /100 WBCS — SIGNIFICANT CHANGE UP (ref 0–0)
NRBC # BLD: 0 /100 WBCS — SIGNIFICANT CHANGE UP (ref 0–0)
PHOSPHATE SERPL-MCNC: 3.5 MG/DL — SIGNIFICANT CHANGE UP (ref 2.5–4.5)
PHOSPHATE SERPL-MCNC: 3.5 MG/DL — SIGNIFICANT CHANGE UP (ref 2.5–4.5)
PLATELET # BLD AUTO: 185 K/UL — SIGNIFICANT CHANGE UP (ref 150–400)
PLATELET # BLD AUTO: 185 K/UL — SIGNIFICANT CHANGE UP (ref 150–400)
POTASSIUM SERPL-MCNC: 4.1 MMOL/L — SIGNIFICANT CHANGE UP (ref 3.5–5.3)
POTASSIUM SERPL-MCNC: 4.1 MMOL/L — SIGNIFICANT CHANGE UP (ref 3.5–5.3)
POTASSIUM SERPL-SCNC: 4.1 MMOL/L — SIGNIFICANT CHANGE UP (ref 3.5–5.3)
POTASSIUM SERPL-SCNC: 4.1 MMOL/L — SIGNIFICANT CHANGE UP (ref 3.5–5.3)
PROT SERPL-MCNC: 6.9 G/DL — SIGNIFICANT CHANGE UP (ref 6–8.3)
PROT SERPL-MCNC: 6.9 G/DL — SIGNIFICANT CHANGE UP (ref 6–8.3)
PROTHROM AB SERPL-ACNC: 12 SEC — SIGNIFICANT CHANGE UP (ref 9.5–13)
PROTHROM AB SERPL-ACNC: 12 SEC — SIGNIFICANT CHANGE UP (ref 9.5–13)
RBC # BLD: 4.21 M/UL — SIGNIFICANT CHANGE UP (ref 4.2–5.8)
RBC # BLD: 4.21 M/UL — SIGNIFICANT CHANGE UP (ref 4.2–5.8)
RBC # FLD: 15.5 % — HIGH (ref 10.3–14.5)
RBC # FLD: 15.5 % — HIGH (ref 10.3–14.5)
RH IG SCN BLD-IMP: POSITIVE — SIGNIFICANT CHANGE UP
RH IG SCN BLD-IMP: POSITIVE — SIGNIFICANT CHANGE UP
SODIUM SERPL-SCNC: 133 MMOL/L — LOW (ref 135–145)
SODIUM SERPL-SCNC: 133 MMOL/L — LOW (ref 135–145)
UFH PPP CHRO-ACNC: 0.44 IU/ML — SIGNIFICANT CHANGE UP (ref 0.3–0.7)
UFH PPP CHRO-ACNC: 0.44 IU/ML — SIGNIFICANT CHANGE UP (ref 0.3–0.7)
WBC # BLD: 8.48 K/UL — SIGNIFICANT CHANGE UP (ref 3.8–10.5)
WBC # BLD: 8.48 K/UL — SIGNIFICANT CHANGE UP (ref 3.8–10.5)
WBC # FLD AUTO: 8.48 K/UL — SIGNIFICANT CHANGE UP (ref 3.8–10.5)
WBC # FLD AUTO: 8.48 K/UL — SIGNIFICANT CHANGE UP (ref 3.8–10.5)

## 2023-12-23 PROCEDURE — 99292 CRITICAL CARE ADDL 30 MIN: CPT | Mod: 25

## 2023-12-23 PROCEDURE — 93010 ELECTROCARDIOGRAM REPORT: CPT

## 2023-12-23 PROCEDURE — 99291 CRITICAL CARE FIRST HOUR: CPT | Mod: GC

## 2023-12-23 PROCEDURE — 99291 CRITICAL CARE FIRST HOUR: CPT

## 2023-12-23 PROCEDURE — 71045 X-RAY EXAM CHEST 1 VIEW: CPT | Mod: 26

## 2023-12-23 RX ORDER — MAGNESIUM SULFATE 500 MG/ML
2 VIAL (ML) INJECTION ONCE
Refills: 0 | Status: COMPLETED | OUTPATIENT
Start: 2023-12-23 | End: 2023-12-23

## 2023-12-23 RX ADMIN — HEPARIN SODIUM 14 UNIT(S)/HR: 5000 INJECTION INTRAVENOUS; SUBCUTANEOUS at 07:57

## 2023-12-23 RX ADMIN — Medication 5 MILLIGRAM(S): at 18:40

## 2023-12-23 RX ADMIN — Medication 50 MILLIGRAM(S): at 15:55

## 2023-12-23 RX ADMIN — BUDESONIDE AND FORMOTEROL FUMARATE DIHYDRATE 2 PUFF(S): 160; 4.5 AEROSOL RESPIRATORY (INHALATION) at 20:53

## 2023-12-23 RX ADMIN — ISOSORBIDE DINITRATE 30 MILLIGRAM(S): 5 TABLET ORAL at 05:22

## 2023-12-23 RX ADMIN — Medication 1: at 11:32

## 2023-12-23 RX ADMIN — HEPARIN SODIUM 14 UNIT(S)/HR: 5000 INJECTION INTRAVENOUS; SUBCUTANEOUS at 02:30

## 2023-12-23 RX ADMIN — Medication 50 MILLIGRAM(S): at 05:23

## 2023-12-23 RX ADMIN — ISOSORBIDE DINITRATE 30 MILLIGRAM(S): 5 TABLET ORAL at 15:55

## 2023-12-23 RX ADMIN — Medication 9 UNIT(S): at 09:28

## 2023-12-23 RX ADMIN — Medication 81 MILLIGRAM(S): at 11:11

## 2023-12-23 RX ADMIN — Medication 50 MILLIGRAM(S): at 21:04

## 2023-12-23 RX ADMIN — Medication 9 UNIT(S): at 11:33

## 2023-12-23 RX ADMIN — ATORVASTATIN CALCIUM 80 MILLIGRAM(S): 80 TABLET, FILM COATED ORAL at 21:04

## 2023-12-23 RX ADMIN — CARVEDILOL PHOSPHATE 25 MILLIGRAM(S): 80 CAPSULE, EXTENDED RELEASE ORAL at 05:23

## 2023-12-23 RX ADMIN — CHLORHEXIDINE GLUCONATE 1 APPLICATION(S): 213 SOLUTION TOPICAL at 21:04

## 2023-12-23 RX ADMIN — INSULIN GLARGINE 12 UNIT(S): 100 INJECTION, SOLUTION SUBCUTANEOUS at 21:04

## 2023-12-23 RX ADMIN — CARVEDILOL PHOSPHATE 25 MILLIGRAM(S): 80 CAPSULE, EXTENDED RELEASE ORAL at 18:40

## 2023-12-23 RX ADMIN — ISOSORBIDE DINITRATE 30 MILLIGRAM(S): 5 TABLET ORAL at 11:11

## 2023-12-23 RX ADMIN — Medication 5 MILLIGRAM(S): at 05:23

## 2023-12-23 RX ADMIN — Medication 25 GRAM(S): at 02:30

## 2023-12-23 NOTE — PROGRESS NOTE ADULT - CRITICAL CARE ATTENDING COMMENT
Patient was seen and examined with the fellow.  I agree with the above except for the following:    Stable on IABP (s/p exchange on 12/21).  Telemetry overnight showed intermittent LBBB with out symptoms.  Appears euvolemic on exam.  Remains listed for transplant, ABO O, PRA 0% on 12/12.

## 2023-12-23 NOTE — PROGRESS NOTE ADULT - CRITICAL CARE ATTENDING COMMENT
57yo M with ischemic HFrEF, ADHF and cardiogenic shock awaiting heart transplant (status 2)    Neuro: no deficits, prn atarax for anxiety  Pulm: sating well on RA, prn albuterol and symbicort  CV: cardiogenic shock on IABP 1:1, cont afterload reduction with coreg, hydral, isordil  Renal: creat stable, no issues  GI: DASH diet  Heme: H/H stable, on heparin gtt for LV thrombus and IABP.   ID: no active issues  Endo: BG controlled  Lines: LFA IABP (12/21), RRA (12/14) 59yo M with ischemic HFrEF, ADHF and cardiogenic shock awaiting heart transplant (status 2)    Neuro: no deficits, prn atarax for anxiety  Pulm: sating well on RA, prn albuterol and symbicort  CV: cardiogenic shock on IABP 1:1, cont afterload reduction with coreg, hydral, isordil  Renal: creat stable, no issues  GI: DASH diet  Heme: H/H stable, on heparin gtt for LV thrombus and IABP.   ID: no active issues  Endo: BG controlled  Lines: LFA IABP (12/21), RRA (12/14)

## 2023-12-23 NOTE — PROGRESS NOTE ADULT - ASSESSMENT
57 yo M with HFrEF (LVIDd 6.4 cm, LVEF 32%), CAD s/p PCI (2008), HTN, DMT2 (A1c 8.3%) and CVA s/p TPA (2018), recently treated for PNA who initially presented to [facility] via EMS after syncope reportedly requiring defibrilation. Treated for ACS but left AMA to come to [facility]. On arrival ECG with ST depression in lateral leads. Intubated 11/1 for respiratory failure. Found to have COVID. L/RHC 11/1revealing  of LAD and RCA, elevated filling pressures and CI of 1.5 prompting placement of IABP. Extubated 11/3. IABP was weaned to off 11/7, then the following day on 11/8, developed PMVT cardiac arrest with prompt CPR and defibrillation, started on IV Lidocaine and Amio. IABP ultimately replaced on 11/9 for worsening hypotension. TTE 11/11 revealing LV thrombus. Mild transaminitis. Now off amio.    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 AT evaluation launched 11/10, he is ABO A. He was discussed during TSC on 11/16 and was approved for listing, however was found to be Bacteremic with 11/17 Blc Cx growing mixed cultures Staph epi and Enterococcus faecalis and started on IV Vanco. Repeat cultures from 11/18 reveal NGTD and therefore was cleared by ID for listing.     GM + rods (Cutibacterium) in blood culture on 11/30 (reported on 12/4), restarted on vancomycin, and status 7, repeat cultures negative x48 hours (12/6).    Currently listed Status 2, ABO A, PRA 0% (12/12).    He currently appears euvolemic He appears adequately supported on IABP at 1:1, electrically quiescent on oral Amiodarone. Cr is normal. IABP changed to LFA 12/21. Awaiting suitable donor. Tele with intermittent LBBB, asymptomatic.     Cardiac Studies  11/21/23 TTE: limited unable to rule out endocarditis, technically difficult study  11/1123 TTE: LVEF 22% (global), LV thrombus, normal RV size and function, mild MR, trace TR  11/1/23  LHC showed pLAD 70 % stenosis in the ostium portion of the segment and 100 % in-stent restenosis and in mRCA, 100 % stenosis.   11/1/23 RHC; RA 23 Vw 24, PA 52/33/40, PCWP 30 Vw 33, AO 85/66/73, PA sat 54.4%, CO/CI (F) 2.96/1.44, IABP was placed during the cath through R common femoral artery.   11/1/23 TTE: LVIDd 6.4cm , LVEF 32%, regional WMA, grade II DD,  TAPSE 1.7cm, mld MR, trace TR.  59 yo M with HFrEF (LVIDd 6.4 cm, LVEF 32%), CAD s/p PCI (2008), HTN, DMT2 (A1c 8.3%) and CVA s/p TPA (2018), recently treated for PNA who initially presented to [facility] via EMS after syncope reportedly requiring defibrilation. Treated for ACS but left AMA to come to [facility]. On arrival ECG with ST depression in lateral leads. Intubated 11/1 for respiratory failure. Found to have COVID. L/RHC 11/1revealing  of LAD and RCA, elevated filling pressures and CI of 1.5 prompting placement of IABP. Extubated 11/3. IABP was weaned to off 11/7, then the following day on 11/8, developed PMVT cardiac arrest with prompt CPR and defibrillation, started on IV Lidocaine and Amio. IABP ultimately replaced on 11/9 for worsening hypotension. TTE 11/11 revealing LV thrombus. Mild transaminitis. Now off amio.    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 AT evaluation launched 11/10, he is ABO A. He was discussed during TSC on 11/16 and was approved for listing, however was found to be Bacteremic with 11/17 Blc Cx growing mixed cultures Staph epi and Enterococcus faecalis and started on IV Vanco. Repeat cultures from 11/18 reveal NGTD and therefore was cleared by ID for listing.     GM + rods (Cutibacterium) in blood culture on 11/30 (reported on 12/4), restarted on vancomycin, and status 7, repeat cultures negative x48 hours (12/6).    Currently listed Status 2, ABO A, PRA 0% (12/12).    He currently appears euvolemic He appears adequately supported on IABP at 1:1, electrically quiescent on oral Amiodarone. Cr is normal. IABP changed to LFA 12/21. Awaiting suitable donor. Tele with intermittent LBBB, asymptomatic.     Cardiac Studies  11/21/23 TTE: limited unable to rule out endocarditis, technically difficult study  11/1123 TTE: LVEF 22% (global), LV thrombus, normal RV size and function, mild MR, trace TR  11/1/23  LHC showed pLAD 70 % stenosis in the ostium portion of the segment and 100 % in-stent restenosis and in mRCA, 100 % stenosis.   11/1/23 RHC; RA 23 Vw 24, PA 52/33/40, PCWP 30 Vw 33, AO 85/66/73, PA sat 54.4%, CO/CI (F) 2.96/1.44, IABP was placed during the cath through R common femoral artery.   11/1/23 TTE: LVIDd 6.4cm , LVEF 32%, regional WMA, grade II DD,  TAPSE 1.7cm, mld MR, trace TR.

## 2023-12-23 NOTE — PROGRESS NOTE ADULT - NS MD NEURO CONDITIONS_HEART2
Systolic (HFrEF)

## 2023-12-23 NOTE — PROGRESS NOTE ADULT - SUBJECTIVE AND OBJECTIVE BOX
DEVORAH VALENCIA  MRN-[id number]  Patient is a 58y old  Male who presents with a chief complaint of CP (22 Dec 2023 20:05)    HPI:  pt seen and approx 1:30 pm  in [facility]    59yo M w/ hx HTN, CAD w/ 1 stent in 2009, ICH (2008) presenting with abn ekg. Patient presented to [facility] where he was found to have STEMI, recommended to get cath however patient did not want to get it there so it left and came here.  Patient initially had cough, congestion, fever, was placed on antibiotics on Sunday.  Started feeling nauseous and had a presyncopal event after which he presented to ED last night.  Had chest pain as well.  Chest pain is midsternal.  Not currently having chest pain.  Received 4 aspirin 30 min pta. (01 Nov 2023 15:11)      Overnight events: No acute events, lytes repleted.     REVIEW OF SYSTEMS:    CONSTITUTIONAL: No weakness, fevers or chills  EYES/ENT: No visual changes;  No vertigo or throat pain   NECK: No pain or stiffness  RESPIRATORY: No cough, wheezing, hemoptysis; No shortness of breath  CARDIOVASCULAR: No chest pain or palpitations  GASTROINTESTINAL: No abdominal or epigastric pain. No nausea, vomiting, or hematemesis; No diarrhea or constipation. No melena or hematochezia.  GENITOURINARY: No dysuria, frequency or hematuria  NEUROLOGICAL: No numbness or weakness  SKIN: No itching, rashes      Physical Exam:  Vital Signs Last 24 Hrs  T(C): 36.9 (23 Dec 2023 03:00), Max: 36.9 (23 Dec 2023 03:00)  T(F): 98.4 (23 Dec 2023 03:00), Max: 98.4 (23 Dec 2023 03:00)  HR: 74 (23 Dec 2023 06:00) (71 - 85)  RR: 17 (23 Dec 2023 06:00) (16 - 29)  SpO2: 98% (23 Dec 2023 06:00) (94% - 100%)    Parameters below as of 22 Dec 2023 21:16  Patient On (Oxygen Delivery Method): room air      PHYSICAL EXAMINATION:  GENERAL: No acute distress, well-developed  HEAD:  Atraumatic, Normocephalic  EYES: EOMI, PERRLA, conjunctiva and sclera clear  NECK: no JVD  CHEST/LUNG: CTAB; No wheezes, rales, or rhonchi  HEART: Regular rate and rhythm. Normal S1/S2  ABDOMEN: Soft, non-tender, non-distended; normal bowel sounds  EXTREMITIES:  2+ peripheral pulses b/l, No clubbing, cyanosis, or edema  NEUROLOGY: A&O x 3, no focal deficits  Lines: L groin IABP clean, dry and intact. s\p r femoral sheath site no hematoma    ============================I/O===========================   I&O's Detail    21 Dec 2023 07:01  -  22 Dec 2023 07:00  --------------------------------------------------------  IN:    Heparin: 196 mL    Oral Fluid: 600 mL  Total IN: 796 mL    OUT:    Voided (mL): 1275 mL  Total OUT: 1275 mL    Total NET: -479 mL      22 Dec 2023 07:01  -  23 Dec 2023 06:35  --------------------------------------------------------  IN:    Heparin: 308 mL    Oral Fluid: 980 mL  Total IN: 1288 mL    OUT:    Voided (mL): 1250 mL  Total OUT: 1250 mL    Total NET: 38 mL        ============================ LABS =========================                        12.6   8.48  )-----------( 185      ( 23 Dec 2023 01:23 )             37.7     12-23    133<L>  |  105  |  30<H>  ----------------------------<  131<H>  4.1   |  19<L>  |  1.18    Ca    10.0      23 Dec 2023 01:23  Phos  3.5     12-23  Mg     1.8     12-23    TPro  6.9  /  Alb  4.0  /  TBili  0.3  /  DBili  x   /  AST  25  /  ALT  65<H>  /  AlkPhos  63  12-23    LIVER FUNCTIONS - ( 23 Dec 2023 01:23 )  Alb: 4.0 g/dL / Pro: 6.9 g/dL / ALK PHOS: 63 U/L / ALT: 65 U/L / AST: 25 U/L / GGT: x           PT/INR - ( 23 Dec 2023 01:23 )   PT: 12.0 sec;   INR: 1.15 ratio         PTT - ( 23 Dec 2023 01:23 )  PTT:70.7 sec    Urinalysis Basic - ( 23 Dec 2023 01:23 )    Color: x / Appearance: x / SG: x / pH: x  Gluc: 131 mg/dL / Ketone: x  / Bili: x / Urobili: x   Blood: x / Protein: x / Nitrite: x   Leuk Esterase: x / RBC: x / WBC x   Sq Epi: x / Non Sq Epi: x / Bacteria: x      ======================Micro/Rad/Cardio=================  Culture: Reviewed   CXR: Reviewed  Echo:Reviewed  ======================================================  PAST MEDICAL & SURGICAL HISTORY:  HTN (hypertension)      CAD (coronary artery disease)  2009; stent      Intracranial hemorrhage  2008      Respiratory arrest  december 1st      Myocardial infarction, unspecified MI type, unspecified artery      History of coronary artery stent placement     DEVORAH VALENCIA  MRN-[id number]  Patient is a 58y old  Male who presents with a chief complaint of CP (22 Dec 2023 20:05)    HPI:  pt seen and approx 1:30 pm  in [facility]    59yo M w/ hx HTN, CAD w/ 1 stent in 2009, ICH (2008) presenting with abn ekg. Patient presented to [facility] where he was found to have STEMI, recommended to get cath however patient did not want to get it there so it left and came here.  Patient initially had cough, congestion, fever, was placed on antibiotics on Sunday.  Started feeling nauseous and had a presyncopal event after which he presented to ED last night.  Had chest pain as well.  Chest pain is midsternal.  Not currently having chest pain.  Received 4 aspirin 30 min pta. (01 Nov 2023 15:11)      Overnight events: No acute events, lytes repleted.     REVIEW OF SYSTEMS:    CONSTITUTIONAL: No weakness, fevers or chills  EYES/ENT: No visual changes;  No vertigo or throat pain   NECK: No pain or stiffness  RESPIRATORY: No cough, wheezing, hemoptysis; No shortness of breath  CARDIOVASCULAR: No chest pain or palpitations  GASTROINTESTINAL: No abdominal or epigastric pain. No nausea, vomiting, or hematemesis; No diarrhea or constipation. No melena or hematochezia.  GENITOURINARY: No dysuria, frequency or hematuria  NEUROLOGICAL: No numbness or weakness  SKIN: No itching, rashes      Physical Exam:  Vital Signs Last 24 Hrs  T(C): 36.9 (23 Dec 2023 03:00), Max: 36.9 (23 Dec 2023 03:00)  T(F): 98.4 (23 Dec 2023 03:00), Max: 98.4 (23 Dec 2023 03:00)  HR: 74 (23 Dec 2023 06:00) (71 - 85)  RR: 17 (23 Dec 2023 06:00) (16 - 29)  SpO2: 98% (23 Dec 2023 06:00) (94% - 100%)    Parameters below as of 22 Dec 2023 21:16  Patient On (Oxygen Delivery Method): room air      PHYSICAL EXAMINATION:  GENERAL: No acute distress, well-developed  HEAD:  Atraumatic, Normocephalic  EYES: EOMI, PERRLA, conjunctiva and sclera clear  NECK: no JVD  CHEST/LUNG: CTAB; No wheezes, rales, or rhonchi  HEART: Regular rate and rhythm. Normal S1/S2  ABDOMEN: Soft, non-tender, non-distended; normal bowel sounds  EXTREMITIES:  2+ peripheral pulses b/l, No clubbing, cyanosis, or edema  NEUROLOGY: A&O x 3, no focal deficits  Lines: L groin IABP clean, dry and intact. s\p r femoral sheath site no hematoma    ============================I/O===========================   I&O's Detail    21 Dec 2023 07:01  -  22 Dec 2023 07:00  --------------------------------------------------------  IN:    Heparin: 196 mL    Oral Fluid: 600 mL  Total IN: 796 mL    OUT:    Voided (mL): 1275 mL  Total OUT: 1275 mL    Total NET: -479 mL      22 Dec 2023 07:01  -  23 Dec 2023 06:35  --------------------------------------------------------  IN:    Heparin: 308 mL    Oral Fluid: 980 mL  Total IN: 1288 mL    OUT:    Voided (mL): 1250 mL  Total OUT: 1250 mL    Total NET: 38 mL        ============================ LABS =========================                        12.6   8.48  )-----------( 185      ( 23 Dec 2023 01:23 )             37.7     12-23    133<L>  |  105  |  30<H>  ----------------------------<  131<H>  4.1   |  19<L>  |  1.18    Ca    10.0      23 Dec 2023 01:23  Phos  3.5     12-23  Mg     1.8     12-23    TPro  6.9  /  Alb  4.0  /  TBili  0.3  /  DBili  x   /  AST  25  /  ALT  65<H>  /  AlkPhos  63  12-23    LIVER FUNCTIONS - ( 23 Dec 2023 01:23 )  Alb: 4.0 g/dL / Pro: 6.9 g/dL / ALK PHOS: 63 U/L / ALT: 65 U/L / AST: 25 U/L / GGT: x           PT/INR - ( 23 Dec 2023 01:23 )   PT: 12.0 sec;   INR: 1.15 ratio         PTT - ( 23 Dec 2023 01:23 )  PTT:70.7 sec    Urinalysis Basic - ( 23 Dec 2023 01:23 )    Color: x / Appearance: x / SG: x / pH: x  Gluc: 131 mg/dL / Ketone: x  / Bili: x / Urobili: x   Blood: x / Protein: x / Nitrite: x   Leuk Esterase: x / RBC: x / WBC x   Sq Epi: x / Non Sq Epi: x / Bacteria: x      ======================Micro/Rad/Cardio=================  Culture: Reviewed   CXR: Reviewed  Echo:Reviewed  ======================================================  PAST MEDICAL & SURGICAL HISTORY:  HTN (hypertension)      CAD (coronary artery disease)  2009; stent      Intracranial hemorrhage  2008      Respiratory arrest  december 1st      Myocardial infarction, unspecified MI type, unspecified artery      History of coronary artery stent placement     DEVORAH VALENCIA  MRN-[id number]  Patient is a 58y old  Male who presents with a chief complaint of CP (22 Dec 2023 20:05)    HPI:  pt seen and approx 1:30 pm  in [facility]    57yo M w/ hx HTN, CAD w/ 1 stent in 2009, ICH (2008) presenting with abn ekg. Patient presented to [facility] where he was found to have STEMI, recommended to get cath however patient did not want to get it there so it left and came here.  Patient initially had cough, congestion, fever, was placed on antibiotics on Sunday.  Started feeling nauseous and had a presyncopal event after which he presented to ED last night.  Had chest pain as well.  Chest pain is midsternal.  Not currently having chest pain.  Received 4 aspirin 30 min pta. (01 Nov 2023 15:11)      Overnight events: No acute events, lytes repleted.     REVIEW OF SYSTEMS:    CONSTITUTIONAL: feels tired today as he did not get sleep   EYES/ENT: No visual changes;  No vertigo or throat pain   NECK: No pain or stiffness  RESPIRATORY: No cough, wheezing, hemoptysis; No shortness of breath  CARDIOVASCULAR: No chest pain or palpitations  GASTROINTESTINAL: No abdominal or epigastric pain. No nausea, vomiting, or hematemesis; No diarrhea or constipation. No melena or hematochezia.  GENITOURINARY: No dysuria, frequency or hematuria  NEUROLOGICAL: No numbness or weakness  SKIN: No itching, rashes      Physical Exam:  Vital Signs Last 24 Hrs  T(C): 36.9 (23 Dec 2023 03:00), Max: 36.9 (23 Dec 2023 03:00)  T(F): 98.4 (23 Dec 2023 03:00), Max: 98.4 (23 Dec 2023 03:00)  HR: 74 (23 Dec 2023 06:00) (71 - 85)  RR: 17 (23 Dec 2023 06:00) (16 - 29)  SpO2: 98% (23 Dec 2023 06:00) (94% - 100%)    Parameters below as of 22 Dec 2023 21:16  Patient On (Oxygen Delivery Method): room air      PHYSICAL EXAMINATION:  GENERAL: No acute distress, well-developed  HEAD:  Atraumatic, Normocephalic  EYES: EOMI, PERRLA, conjunctiva and sclera clear  NECK: no JVD  CHEST/LUNG: CTAB; No wheezes, rales, or rhonchi  HEART: Regular rate and rhythm. Normal S1/S2  ABDOMEN: Soft, non-tender, non-distended; normal bowel sounds  EXTREMITIES:  2+ peripheral pulses b/l, No clubbing, cyanosis, or edema  NEUROLOGY: A&O x 3, no focal deficits  Lines: L groin IABP clean, dry and intact. s\p r femoral sheath site no hematoma    ============================I/O===========================   I&O's Detail    21 Dec 2023 07:01  -  22 Dec 2023 07:00  --------------------------------------------------------  IN:    Heparin: 196 mL    Oral Fluid: 600 mL  Total IN: 796 mL    OUT:    Voided (mL): 1275 mL  Total OUT: 1275 mL    Total NET: -479 mL      22 Dec 2023 07:01  -  23 Dec 2023 06:35  --------------------------------------------------------  IN:    Heparin: 308 mL    Oral Fluid: 980 mL  Total IN: 1288 mL    OUT:    Voided (mL): 1250 mL  Total OUT: 1250 mL    Total NET: 38 mL        ============================ LABS =========================                        12.6   8.48  )-----------( 185      ( 23 Dec 2023 01:23 )             37.7     12-23    133<L>  |  105  |  30<H>  ----------------------------<  131<H>  4.1   |  19<L>  |  1.18    Ca    10.0      23 Dec 2023 01:23  Phos  3.5     12-23  Mg     1.8     12-23    TPro  6.9  /  Alb  4.0  /  TBili  0.3  /  DBili  x   /  AST  25  /  ALT  65<H>  /  AlkPhos  63  12-23    LIVER FUNCTIONS - ( 23 Dec 2023 01:23 )  Alb: 4.0 g/dL / Pro: 6.9 g/dL / ALK PHOS: 63 U/L / ALT: 65 U/L / AST: 25 U/L / GGT: x           PT/INR - ( 23 Dec 2023 01:23 )   PT: 12.0 sec;   INR: 1.15 ratio         PTT - ( 23 Dec 2023 01:23 )  PTT:70.7 sec    Urinalysis Basic - ( 23 Dec 2023 01:23 )    Color: x / Appearance: x / SG: x / pH: x  Gluc: 131 mg/dL / Ketone: x  / Bili: x / Urobili: x   Blood: x / Protein: x / Nitrite: x   Leuk Esterase: x / RBC: x / WBC x   Sq Epi: x / Non Sq Epi: x / Bacteria: x      ======================Micro/Rad/Cardio=================  Culture: Reviewed   CXR: Reviewed  Echo:Reviewed  ======================================================  PAST MEDICAL & SURGICAL HISTORY:  HTN (hypertension)      CAD (coronary artery disease)  2009; stent      Intracranial hemorrhage  2008      Respiratory arrest  december 1st      Myocardial infarction, unspecified MI type, unspecified artery      History of coronary artery stent placement     DEVORAH VALENCIA  MRN-[id number]  Patient is a 58y old  Male who presents with a chief complaint of CP (22 Dec 2023 20:05)    HPI:  pt seen and approx 1:30 pm  in [facility]    57yo M w/ hx HTN, CAD w/ 1 stent in 2009, ICH (2008) presenting with abn ekg. Patient presented to [facility] where he was found to have STEMI, recommended to get cath however patient did not want to get it there so it left and came here.  Patient initially had cough, congestion, fever, was placed on antibiotics on Sunday.  Started feeling nauseous and had a presyncopal event after which he presented to ED last night.  Had chest pain as well.  Chest pain is midsternal.  Not currently having chest pain.  Received 4 aspirin 30 min pta. (01 Nov 2023 15:11)      Overnight events: No acute events, lytes repleted.     REVIEW OF SYSTEMS:    CONSTITUTIONAL: feels tired today as he did not get sleep   EYES/ENT: No visual changes;  No vertigo or throat pain   NECK: No pain or stiffness  RESPIRATORY: No cough, wheezing, hemoptysis; No shortness of breath  CARDIOVASCULAR: No chest pain or palpitations  GASTROINTESTINAL: No abdominal or epigastric pain. No nausea, vomiting, or hematemesis; No diarrhea or constipation. No melena or hematochezia.  GENITOURINARY: No dysuria, frequency or hematuria  NEUROLOGICAL: No numbness or weakness  SKIN: No itching, rashes      Physical Exam:  Vital Signs Last 24 Hrs  T(C): 36.9 (23 Dec 2023 03:00), Max: 36.9 (23 Dec 2023 03:00)  T(F): 98.4 (23 Dec 2023 03:00), Max: 98.4 (23 Dec 2023 03:00)  HR: 74 (23 Dec 2023 06:00) (71 - 85)  RR: 17 (23 Dec 2023 06:00) (16 - 29)  SpO2: 98% (23 Dec 2023 06:00) (94% - 100%)    Parameters below as of 22 Dec 2023 21:16  Patient On (Oxygen Delivery Method): room air      PHYSICAL EXAMINATION:  GENERAL: No acute distress, well-developed  HEAD:  Atraumatic, Normocephalic  EYES: EOMI, PERRLA, conjunctiva and sclera clear  NECK: no JVD  CHEST/LUNG: CTAB; No wheezes, rales, or rhonchi  HEART: Regular rate and rhythm. Normal S1/S2  ABDOMEN: Soft, non-tender, non-distended; normal bowel sounds  EXTREMITIES:  2+ peripheral pulses b/l, No clubbing, cyanosis, or edema  NEUROLOGY: A&O x 3, no focal deficits  Lines: L groin IABP clean, dry and intact. s\p r femoral sheath site no hematoma    ============================I/O===========================   I&O's Detail    21 Dec 2023 07:01  -  22 Dec 2023 07:00  --------------------------------------------------------  IN:    Heparin: 196 mL    Oral Fluid: 600 mL  Total IN: 796 mL    OUT:    Voided (mL): 1275 mL  Total OUT: 1275 mL    Total NET: -479 mL      22 Dec 2023 07:01  -  23 Dec 2023 06:35  --------------------------------------------------------  IN:    Heparin: 308 mL    Oral Fluid: 980 mL  Total IN: 1288 mL    OUT:    Voided (mL): 1250 mL  Total OUT: 1250 mL    Total NET: 38 mL        ============================ LABS =========================                        12.6   8.48  )-----------( 185      ( 23 Dec 2023 01:23 )             37.7     12-23    133<L>  |  105  |  30<H>  ----------------------------<  131<H>  4.1   |  19<L>  |  1.18    Ca    10.0      23 Dec 2023 01:23  Phos  3.5     12-23  Mg     1.8     12-23    TPro  6.9  /  Alb  4.0  /  TBili  0.3  /  DBili  x   /  AST  25  /  ALT  65<H>  /  AlkPhos  63  12-23    LIVER FUNCTIONS - ( 23 Dec 2023 01:23 )  Alb: 4.0 g/dL / Pro: 6.9 g/dL / ALK PHOS: 63 U/L / ALT: 65 U/L / AST: 25 U/L / GGT: x           PT/INR - ( 23 Dec 2023 01:23 )   PT: 12.0 sec;   INR: 1.15 ratio         PTT - ( 23 Dec 2023 01:23 )  PTT:70.7 sec    Urinalysis Basic - ( 23 Dec 2023 01:23 )    Color: x / Appearance: x / SG: x / pH: x  Gluc: 131 mg/dL / Ketone: x  / Bili: x / Urobili: x   Blood: x / Protein: x / Nitrite: x   Leuk Esterase: x / RBC: x / WBC x   Sq Epi: x / Non Sq Epi: x / Bacteria: x      ======================Micro/Rad/Cardio=================  Culture: Reviewed   CXR: Reviewed  Echo:Reviewed  ======================================================  PAST MEDICAL & SURGICAL HISTORY:  HTN (hypertension)      CAD (coronary artery disease)  2009; stent      Intracranial hemorrhage  2008      Respiratory arrest  december 1st      Myocardial infarction, unspecified MI type, unspecified artery      History of coronary artery stent placement

## 2023-12-23 NOTE — PROGRESS NOTE ADULT - SUBJECTIVE AND OBJECTIVE BOX
Subjective:  No acute events.     Medications:  aspirin  chewable 81 milliGRAM(s) Oral daily  atorvastatin 80 milliGRAM(s) Oral at bedtime  bisacodyl 5 milliGRAM(s) Oral every 12 hours  budesonide  80 MICROgram(s)/formoterol 4.5 MICROgram(s) Inhaler 2 Puff(s) Inhalation two times a day  carvedilol 25 milliGRAM(s) Oral every 12 hours  chlorhexidine 2% Cloths 1 Application(s) Topical daily  heparin  Infusion 1300 Unit(s)/Hr IV Continuous <Continuous>  hepatitis B Vaccine - Adult (HEPLISAV-B) 20 MICROGram(s) IntraMuscular once  hydrALAZINE 50 milliGRAM(s) Oral every 8 hours  hydrOXYzine hydrochloride 25 milliGRAM(s) Oral two times a day PRN  insulin glargine Injectable (LANTUS) 12 Unit(s) SubCutaneous at bedtime  insulin lispro (ADMELOG) corrective regimen sliding scale   SubCutaneous at bedtime  insulin lispro (ADMELOG) corrective regimen sliding scale   SubCutaneous three times a day before meals  insulin lispro Injectable (ADMELOG) 9 Unit(s) SubCutaneous three times a day before meals  isosorbide   dinitrate Tablet (ISORDIL) 30 milliGRAM(s) Oral three times a day  polyethylene glycol 3350 17 Gram(s) Oral two times a day  senna 2 Tablet(s) Oral at bedtime    Vitals:  T(C): 36.9 (12-23-23 @ 11:00), Max: 36.9 (12-23-23 @ 03:00)  HR: 75 (12-23-23 @ 11:00) (72 - 85)  RR: 14 (12-23-23 @ 11:00) (13 - 29)  SpO2: 96% (12-23-23 @ 11:00) (94% - 100%)      I&O's Summary    22 Dec 2023 07:01  -  23 Dec 2023 07:00  --------------------------------------------------------  IN: 1316 mL / OUT: 1600 mL / NET: -284 mL    23 Dec 2023 07:01  -  23 Dec 2023 12:35  --------------------------------------------------------  IN: 296 mL / OUT: 0 mL / NET: 296 mL        Physical Exam:  General: No distress. Comfortable.  HEENT: EOM intact.  Neck: Neck supple. JVP not elevated. No masses  Chest: Clear to auscultation bilaterally  CV: Normal S1 and S2. No murmurs, rub, or gallops. Radial pulses normal. R PT palpable, L DP palpable  Abdomen: Soft, non-distended, non-tender  Skin: R fem prior IABP site with old blood at site. Dressing at L IABP site intact, site without drainage or hematoma.   Neurology: Alert and oriented times three. Sensation intact  Psych: Affect normal        Labs:                        12.6   8.48  )-----------( 185      ( 23 Dec 2023 01:23 )             37.7     12-23    133<L>  |  105  |  30<H>  ----------------------------<  131<H>  4.1   |  19<L>  |  1.18    Ca    10.0      23 Dec 2023 01:23  Phos  3.5     12-23  Mg     1.8     12-23    TPro  6.9  /  Alb  4.0  /  TBili  0.3  /  DBili  x   /  AST  25  /  ALT  65<H>  /  AlkPhos  63  12-23      Lactate, Blood: 0.6 mmol/L (12-23 @ 01:23)

## 2023-12-23 NOTE — PROGRESS NOTE ADULT - PROBLEM SELECTOR PLAN 3
- PMVT into VF arrest 11/8, 3 rounds with shocks  -currently off amio  -monitor LFTs  - IABP support as above  - seen by EP.

## 2023-12-23 NOTE — PROGRESS NOTE ADULT - SUBJECTIVE AND OBJECTIVE BOX
DEVORAH VALENCIA  MRN-[id number]  Patient is a 58y old  Male who presents with a chief complaint of cardiogenic shock (23 Dec 2023 06:34)    HPI:  pt seen and approx 1:30 pm  in [facility]    59yo M w/ hx HTN, CAD w/ 1 stent in , ICH () presenting with abn ekg. Patient presented to [facility] where he was found to have STEMI, recommended to get cath however patient did not want to get it there so it left and came here.  Patient initially had cough, congestion, fever, was placed on antibiotics on .  Started feeling nauseous and had a presyncopal event after which he presented to ED last night.  Had chest pain as well.  Chest pain is midsternal.  Not currently having chest pain.  Received 4 aspirin 30 min pta. (2023 15:11)      Hospital Course:    24 HOUR EVENTS:    REVIEW OF SYSTEMS:    CONSTITUTIONAL: feels tired today as he did not get sleep   EYES/ENT: No visual changes;  No vertigo or throat pain   NECK: No pain or stiffness  RESPIRATORY: No cough, wheezing, hemoptysis; No shortness of breath  CARDIOVASCULAR: No chest pain or palpitations  GASTROINTESTINAL: No abdominal or epigastric pain. No nausea, vomiting, or hematemesis; No diarrhea or constipation. No melena or hematochezia.  GENITOURINARY: No dysuria, frequency or hematuria  NEUROLOGICAL: No numbness or weakness  SKIN: No itching, rashes      ICU Vital Signs Last 24 Hrs  T(C): 36.7 (23 Dec 2023 15:00), Max: 36.9 (23 Dec 2023 03:00)  T(F): 98.1 (23 Dec 2023 15:00), Max: 98.4 (23 Dec 2023 03:00)  HR: 80 (23 Dec 2023 19:00) (72 - 85)  BP: --  BP(mean): --  ABP: --  ABP(mean): --  RR: 16 (23 Dec 2023 19:00) (13 - 29)  SpO2: 95% (23 Dec 2023 19:00) (94% - 100%)    O2 Parameters below as of 23 Dec 2023 19:00  Patient On (Oxygen Delivery Method): room air            CVP(mm Hg): --  CO: --  CI: --  PA: --  PA(mean): --  PA(direct): --  PCWP: --  LA: --  RA: --  SVR: --  SVRI: --  PVR: --  PVRI: --  I&O's Summary    22 Dec 2023 07:01  -  23 Dec 2023 07:00  --------------------------------------------------------  IN: 1316 mL / OUT: 1600 mL / NET: -284 mL    23 Dec 2023 07:  -  23 Dec 2023 19:51  --------------------------------------------------------  IN: 888 mL / OUT: 250 mL / NET: 638 mL        CAPILLARY BLOOD GLUCOSE    CAPILLARY BLOOD GLUCOSE      POCT Blood Glucose.: 129 mg/dL (23 Dec 2023 16:44)      PHYSICAL EXAM:  GENERAL: No acute distress, well-developed  HEAD:  Atraumatic, Normocephalic  EYES: EOMI, PERRLA, conjunctiva and sclera clear  NECK: no JVD  CHEST/LUNG: CTAB; No wheezes, rales, or rhonchi  HEART: Regular rate and rhythm. Normal S1/S2  ABDOMEN: Soft, non-tender, non-distended; normal bowel sounds  EXTREMITIES:  2+ peripheral pulses b/l, No clubbing, cyanosis, or edema  NEUROLOGY: A&O x 3, no focal deficits  Lines: L groin IABP clean, dry and intact. sr femoral sheath site no hematoma    ============================I/O===========================   I&O's Detail    22 Dec 2023 07:01  -  23 Dec 2023 07:00  --------------------------------------------------------  IN:    Heparin: 336 mL    Oral Fluid: 980 mL  Total IN: 1316 mL    OUT:    Voided (mL): 1600 mL  Total OUT: 1600 mL    Total NET: -284 mL      23 Dec 2023 07:01  -  23 Dec 2023 19:51  --------------------------------------------------------  IN:    Heparin: 168 mL    Oral Fluid: 720 mL  Total IN: 888 mL    OUT:    Voided (mL): 250 mL  Total OUT: 250 mL    Total NET: 638 mL        ============================ LABS =========================                        12.6   8.48  )-----------( 185      ( 23 Dec 2023 01:23 )             37.7         133<L>  |  105  |  30<H>  ----------------------------<  131<H>  4.1   |  19<L>  |  1.18    Ca    10.0      23 Dec 2023 01:23  Phos  3.5       Mg     1.8         TPro  6.9  /  Alb  4.0  /  TBili  0.3  /  DBili  x   /  AST  25  /  ALT  65<H>  /  AlkPhos  63                  LIVER FUNCTIONS - ( 23 Dec 2023 01:23 )  Alb: 4.0 g/dL / Pro: 6.9 g/dL / ALK PHOS: 63 U/L / ALT: 65 U/L / AST: 25 U/L / GGT: x           PT/INR - ( 23 Dec 2023 01:23 )   PT: 12.0 sec;   INR: 1.15 ratio         PTT - ( 23 Dec 2023 01:23 )  PTT:70.7 sec    Lactate, Blood: 0.6 mmol/L (23 @ 01:23)  Lactate, Blood: 0.9 mmol/L (23 @ 02:02)    Urinalysis Basic - ( 23 Dec 2023 01:23 )    Color: x / Appearance: x / SG: x / pH: x  Gluc: 131 mg/dL / Ketone: x  / Bili: x / Urobili: x   Blood: x / Protein: x / Nitrite: x   Leuk Esterase: x / RBC: x / WBC x   Sq Epi: x / Non Sq Epi: x / Bacteria: x      ======================Micro/Rad/Cardio=================  Telemtry: Reviewed   EKG: Reviewed  CXR: Reviewed  Culture: Reviewed   Echo:   Cath: Cardiac Cath Lab - Adult:   [facility]  Department of Cardiology  [address] [place] [address]  [phone]  Cath Lab Report -- Comprehensive Report  Patient: LD VALENCIA  Study date: 2017  Account number: [id number]  MR number: [id number]  : 1965  Gender: Male  Race: W  Case Physician(s):  Jairon Holguin M.D.  Referring Physician:  Luc Lynn M.D.  INDICATIONS: Unstable angina - CCS4.  HISTORY: The patient has a history of coronary artery disease. The patient  hashypertension and medication-treated dyslipidemia.  PROCEDURE:  --  Left heart catheterization with ventriculography.  --  Left coronary angiography.  --  Right coronary angiography.  TECHNIQUE: The risks and alternatives of the procedures and conscious  sedation were explained to the patient and informed consent was obtained.  Cardiac catheterization performed electively.  Local anesthetic given. Right radial artery access. A 6FR PRELUDE KIT was  inserted in the vessel. Left heart catheterization. Ventriculography was  performed. A 5FR FR4.0 EXPO catheter was utilized. Left coronary artery  angiography. The vessel was injected utilizing a 5FR FL3.5 EXPO catheter.  Right coronary artery angiography. The vessel was injected utilizing a 5FR  FR4.0 EXPO catheter. RADIATION EXPOSURE: 1.1 min.  CONTRAST GIVEN: Omnipaque 55 ml.  MEDICATIONS GIVEN: Midazolam, 1 mg, IV. Fentanyl, 25 mcg, IV. Verapamil  (Isoptin, Calan, Covera), 2.5 mg, IA. Heparin, 3000 units, IA.  VENTRICLES: Global left ventricular function was moderately depressed. EF  estimated was 40 %.  CORONARY VESSELS: The coronary circulation is right dominant.  LM:   --  LM: Normal.  LAD:   --  Proximal LAD: There was a 50 % stenosis.  CX:   --  Circumflex: Normal.  RCA:   --  Mid RCA: There was a 40 % stenosis.  --  Distal RCA: There was a 50 % stenosis.  COMPLICATIONS: There were no complications.  DIAGNOSTIC RECOMMENDATIONS: The patient should continue with the present  medications.  Prepared and signed by  Jairon Holguin M.D.  Signed 2017 12:20:13  HEMODYNAMIC TABLES  Pressures:  Baseline/ Room Air  Pressures:  - HR: 78  Pressures:  - Rhythm:  Pressures:  -- Aortic Pressure (S/D/M): --/--/99  Pressures:  -- Left Ventricle (s/edp): 157/39/--  Outputs:  Baseline/ Room Air  Outputs:  -- CALCULATIONS[age]  Outputs:  -- CALCULATIONS: Body Surface Area: 2.05  Outputs:  -- CALCULATIONS: Height in cm: 175.00  Outputs:  -- CALCULATIONS: Sex: Male  Outputs:  -- CALCULATIONS: Weight in k.40 (17 @ 21:55)    ======================================================  PAST MEDICAL & SURGICAL HISTORY:  HTN (hypertension)      CAD (coronary artery disease)  ; stent      Intracranial hemorrhage        Respiratory arrest        Myocardial infarction, unspecified MI type, unspecified artery      History of coronary artery stent placement        ====================ASSESSMENT ==============  58 male with HTN, CAD (s/p PCI ), HFrEF, CVA , and T2DM presenting with chest pressure and unknown tachycardia that was shocked x1, [facility]  found to have in-stent restenosis of pLAD and  of RCA, admitted to CICU for management of cardiogenic shock and ADHF requiring IABP  -, with hospital course c/b vfib arrest requiring reinsertion of IABP, currently listed for transplant status 2.    ====================== NEUROLOGY=====================  Anxiety  - No Seroquel/antipsychotics since vfib arrest and prolonged QTC  - Psych Eval, recommended SSRI, but pt. refused   - Psych recommending atarax PRN  - Continue to monitor mental status    PT/Conditioning  - Continue band exercises while on bedrest s/t IABP    ==================== RESPIRATORY======================  Acute Hypoxemic Respiratory Failure  - s/p x2 intubations for cardiogenic pulm edema and the in setting of cardiac arrest, resolved - extubated 11/10  - Currently maintaining >95% sats on room air  - Continue incentive spirometry and monitoring of sp02    Asthma  - c/w symbicort  - On trelegy at home  - Continue to monitor SpO2 with goal >94%    ====================CARDIOVASCULAR==================  Vfib arrest i/s/o ischemia  - Lido gtt off   - PO Amio load - total of 5g per EP complete   - Amio dc'd  for rising LFTs  - Keep K > 4, Mag > 2.2     Cardiogenic shock requiring IABP (- , -)  - Likely 2/2 NSTEMI and ADHF  -  LHC: pLAD 100 % in-stent restenosis & mRCA, 100 %. PCWP 30. IABP placed.  -  TTE: LV dilated. EF 32 %. Regional WMAs present, mod (grade 2) LV diastolic dysfunction  -  TTE: EF 22% and + LV thrombus  - appears euvolemic  - IABP swapped to LFA , continue 1:1 support  - hydralazine 50 TID and ISD 30 TID for AL reduction  - c/w coreg 25 BID for GDMT  - James d/c'd due to elevated K levels  - UNOS status 2 as of     NSTEMI iso stent re-occlusion of pLAD and 100%  of RCA  - EKG on admission w/ LBBB  - DAPT: c/w ASA, Brilinta d/c'd per transplant w/u  - c/w lipitor 80  - cMR deferred given necessity of IABP  - CT sx not recommending CABG, undergoing AT eval  - has intermittent LBBB with no symptoms     LV thrombus  - c/w heparin gtt w/ therapeutic PTT    ===================== RENAL =========================  Non-oliguric ARIC, resolved  - Cr   - Baseline Cr: -  - Renal US: no evidence of renal artery stenosis  - Trend BMP, lytes daily, replace as needed  - Continue Strict I/Os, avoid nephrotoxins    =============== GASTROINTESTINAL===================  Constipation/ileus, resolved  - regular diet  - bowel reg prn  - last BM     ===================ENDO====================  Type 2 DM  - A1c 8.3, glucose controlled  - Continue lantus, premeal, low ISS  - continue FS monitoring    ===================HEMATOLOGIC/ONC ===================  - H/H & plts stable, no s/sx of bleeding  - Monitor H/H and plts q48h  - VTE PPX: heparin gtt    ==================INFECTIOUS DISEASE================  Positive blood culture, resolved  - Repeat BCx drawn  for leukocytosis to 12k - positive on , G+ rods in anaerobic bottle, suspect contaminant  - Repeat cultures x2 sent  per transplant ID recs - no growth to date  - Vancomycin by trough (-)  - Final microbial ID: Cutibacterium acnes, per ID this is likely to be a skin contaminant, vanc dc'd.   - Dental eval  to rule out infectious etiology that would have led to bacteremia, no significant findings on exam.     Enterococcus faecalis bacteremia, resolved  - BCx + for enterococcus faecalis x2, Staph epi x1 (likely contaminant)- pan sensitive   - Urine cx  + enterococcus faecalis  - BCx  no growth   - IABP site swapped to RFA   - s/p Vancomycin 1g q12h (-)  - CT A/P negative for infectious pathology    COVID, resolved  - Off airborne precautions     Pre-transplant ID w/u   - Trend ID recs for serologies   - Colonoscopy  - normal   - Chest CT  - improved LLL aeration  - s/p immunizations    ==================DERMATOLOGY================   Upper Extremity Rash  - Patient developed bilateral upper extremity rash after receiving Hepatitis A & B immunizations on   - Dermatology consulted  - not likely to be related to vanco  - Recommend clobetasol 0.05% BID to affected areas   - RVP repeated to rule out viral etiology, negative    Left neck mass  - US soft tissue:  lateral left neck 2.0 x 0.7 x 1.4 cm heterogeneous complex solid and cystic appearing lesion in subcutaneous soft tissue extending into subjacent strap muscle  - vasc cardiology consult: JOSE J venous duplex US ordered,  - Subcentimeter lymph nodes are present in the left neck and axilla, No evidence of left upper extremity DVT, Superficial thrombosis of the cephalic vein in the left upper arm.  - continue to monitor site    Patient requires continuous monitoring with bedside rhythm monitoring, pulse ox monitoring, and intermittent blood gas analysis. Care plan discussed with ICU care team. Patient remained critical and at risk for life threatening decompensation.  Patient seen, examined and plan discussed with CCU team during rounds.     I have personally provided ____ minutes of critical care time excluding time spent on separate procedures, in addition to initial critical care time provided by the CICU Attending, Dr. Durand .     By signing my name below, I, Sonia Preciado, attest that this documentation has been prepared under the direction and in the presence of Kirsty Davis NP  Electronically signed: Rosalba Freitas, 23 @ 19:51    I, Kirsty Davis NP, personally performed the services described in this documentation. all medical record entries made by the scribe were at my direction and in my presence. I have reviewed the chart and agree that the record reflects my personal performance and is accurate and complete  Electronically signed: Kirsty Davis NP       DEVORAH VALENCIA  MRN-[id number]  Patient is a 58y old  Male who presents with a chief complaint of cardiogenic shock (23 Dec 2023 06:34)    HPI:  pt seen and approx 1:30 pm  in [facility]    59yo M w/ hx HTN, CAD w/ 1 stent in , ICH () presenting with abn ekg. Patient presented to [facility] where he was found to have STEMI, recommended to get cath however patient did not want to get it there so it left and came here.  Patient initially had cough, congestion, fever, was placed on antibiotics on .  Started feeling nauseous and had a presyncopal event after which he presented to ED last night.  Had chest pain as well.  Chest pain is midsternal.  Not currently having chest pain.  Received 4 aspirin 30 min pta. (2023 15:11)      Hospital Course:    24 HOUR EVENTS:    REVIEW OF SYSTEMS:    CONSTITUTIONAL: feels tired today as he did not get sleep   EYES/ENT: No visual changes;  No vertigo or throat pain   NECK: No pain or stiffness  RESPIRATORY: No cough, wheezing, hemoptysis; No shortness of breath  CARDIOVASCULAR: No chest pain or palpitations  GASTROINTESTINAL: No abdominal or epigastric pain. No nausea, vomiting, or hematemesis; No diarrhea or constipation. No melena or hematochezia.  GENITOURINARY: No dysuria, frequency or hematuria  NEUROLOGICAL: No numbness or weakness  SKIN: No itching, rashes      ICU Vital Signs Last 24 Hrs  T(C): 36.7 (23 Dec 2023 15:00), Max: 36.9 (23 Dec 2023 03:00)  T(F): 98.1 (23 Dec 2023 15:00), Max: 98.4 (23 Dec 2023 03:00)  HR: 80 (23 Dec 2023 19:00) (72 - 85)  BP: --  BP(mean): --  ABP: --  ABP(mean): --  RR: 16 (23 Dec 2023 19:00) (13 - 29)  SpO2: 95% (23 Dec 2023 19:00) (94% - 100%)    O2 Parameters below as of 23 Dec 2023 19:00  Patient On (Oxygen Delivery Method): room air            CVP(mm Hg): --  CO: --  CI: --  PA: --  PA(mean): --  PA(direct): --  PCWP: --  LA: --  RA: --  SVR: --  SVRI: --  PVR: --  PVRI: --  I&O's Summary    22 Dec 2023 07:01  -  23 Dec 2023 07:00  --------------------------------------------------------  IN: 1316 mL / OUT: 1600 mL / NET: -284 mL    23 Dec 2023 07:  -  23 Dec 2023 19:51  --------------------------------------------------------  IN: 888 mL / OUT: 250 mL / NET: 638 mL        CAPILLARY BLOOD GLUCOSE    CAPILLARY BLOOD GLUCOSE      POCT Blood Glucose.: 129 mg/dL (23 Dec 2023 16:44)      PHYSICAL EXAM:  GENERAL: No acute distress, well-developed  HEAD:  Atraumatic, Normocephalic  EYES: EOMI, PERRLA, conjunctiva and sclera clear  NECK: no JVD  CHEST/LUNG: CTAB; No wheezes, rales, or rhonchi  HEART: Regular rate and rhythm. Normal S1/S2  ABDOMEN: Soft, non-tender, non-distended; normal bowel sounds  EXTREMITIES:  2+ peripheral pulses b/l, No clubbing, cyanosis, or edema  NEUROLOGY: A&O x 3, no focal deficits  Lines: L groin IABP clean, dry and intact. sr femoral sheath site no hematoma    ============================I/O===========================   I&O's Detail    22 Dec 2023 07:01  -  23 Dec 2023 07:00  --------------------------------------------------------  IN:    Heparin: 336 mL    Oral Fluid: 980 mL  Total IN: 1316 mL    OUT:    Voided (mL): 1600 mL  Total OUT: 1600 mL    Total NET: -284 mL      23 Dec 2023 07:01  -  23 Dec 2023 19:51  --------------------------------------------------------  IN:    Heparin: 168 mL    Oral Fluid: 720 mL  Total IN: 888 mL    OUT:    Voided (mL): 250 mL  Total OUT: 250 mL    Total NET: 638 mL        ============================ LABS =========================                        12.6   8.48  )-----------( 185      ( 23 Dec 2023 01:23 )             37.7         133<L>  |  105  |  30<H>  ----------------------------<  131<H>  4.1   |  19<L>  |  1.18    Ca    10.0      23 Dec 2023 01:23  Phos  3.5       Mg     1.8         TPro  6.9  /  Alb  4.0  /  TBili  0.3  /  DBili  x   /  AST  25  /  ALT  65<H>  /  AlkPhos  63                  LIVER FUNCTIONS - ( 23 Dec 2023 01:23 )  Alb: 4.0 g/dL / Pro: 6.9 g/dL / ALK PHOS: 63 U/L / ALT: 65 U/L / AST: 25 U/L / GGT: x           PT/INR - ( 23 Dec 2023 01:23 )   PT: 12.0 sec;   INR: 1.15 ratio         PTT - ( 23 Dec 2023 01:23 )  PTT:70.7 sec    Lactate, Blood: 0.6 mmol/L (23 @ 01:23)  Lactate, Blood: 0.9 mmol/L (23 @ 02:02)    Urinalysis Basic - ( 23 Dec 2023 01:23 )    Color: x / Appearance: x / SG: x / pH: x  Gluc: 131 mg/dL / Ketone: x  / Bili: x / Urobili: x   Blood: x / Protein: x / Nitrite: x   Leuk Esterase: x / RBC: x / WBC x   Sq Epi: x / Non Sq Epi: x / Bacteria: x      ======================Micro/Rad/Cardio=================  Telemtry: Reviewed   EKG: Reviewed  CXR: Reviewed  Culture: Reviewed   Echo:   Cath: Cardiac Cath Lab - Adult:   [facility]  Department of Cardiology  [address] [place] [address]  [phone]  Cath Lab Report -- Comprehensive Report  Patient: LD VALENCIA  Study date: 2017  Account number: [id number]  MR number: [id number]  : 1965  Gender: Male  Race: W  Case Physician(s):  Jairon Holguin M.D.  Referring Physician:  Luc Lynn M.D.  INDICATIONS: Unstable angina - CCS4.  HISTORY: The patient has a history of coronary artery disease. The patient  hashypertension and medication-treated dyslipidemia.  PROCEDURE:  --  Left heart catheterization with ventriculography.  --  Left coronary angiography.  --  Right coronary angiography.  TECHNIQUE: The risks and alternatives of the procedures and conscious  sedation were explained to the patient and informed consent was obtained.  Cardiac catheterization performed electively.  Local anesthetic given. Right radial artery access. A 6FR PRELUDE KIT was  inserted in the vessel. Left heart catheterization. Ventriculography was  performed. A 5FR FR4.0 EXPO catheter was utilized. Left coronary artery  angiography. The vessel was injected utilizing a 5FR FL3.5 EXPO catheter.  Right coronary artery angiography. The vessel was injected utilizing a 5FR  FR4.0 EXPO catheter. RADIATION EXPOSURE: 1.1 min.  CONTRAST GIVEN: Omnipaque 55 ml.  MEDICATIONS GIVEN: Midazolam, 1 mg, IV. Fentanyl, 25 mcg, IV. Verapamil  (Isoptin, Calan, Covera), 2.5 mg, IA. Heparin, 3000 units, IA.  VENTRICLES: Global left ventricular function was moderately depressed. EF  estimated was 40 %.  CORONARY VESSELS: The coronary circulation is right dominant.  LM:   --  LM: Normal.  LAD:   --  Proximal LAD: There was a 50 % stenosis.  CX:   --  Circumflex: Normal.  RCA:   --  Mid RCA: There was a 40 % stenosis.  --  Distal RCA: There was a 50 % stenosis.  COMPLICATIONS: There were no complications.  DIAGNOSTIC RECOMMENDATIONS: The patient should continue with the present  medications.  Prepared and signed by  Jairon Holguin M.D.  Signed 2017 12:20:13  HEMODYNAMIC TABLES  Pressures:  Baseline/ Room Air  Pressures:  - HR: 78  Pressures:  - Rhythm:  Pressures:  -- Aortic Pressure (S/D/M): --/--/99  Pressures:  -- Left Ventricle (s/edp): 157/39/--  Outputs:  Baseline/ Room Air  Outputs:  -- CALCULATIONS[age]  Outputs:  -- CALCULATIONS: Body Surface Area: 2.05  Outputs:  -- CALCULATIONS: Height in cm: 175.00  Outputs:  -- CALCULATIONS: Sex: Male  Outputs:  -- CALCULATIONS: Weight in k.40 (17 @ 21:55)    ======================================================  PAST MEDICAL & SURGICAL HISTORY:  HTN (hypertension)      CAD (coronary artery disease)  ; stent      Intracranial hemorrhage        Respiratory arrest        Myocardial infarction, unspecified MI type, unspecified artery      History of coronary artery stent placement        ====================ASSESSMENT ==============  58 male with HTN, CAD (s/p PCI ), HFrEF, CVA , and T2DM presenting with chest pressure and unknown tachycardia that was shocked x1, [facility]  found to have in-stent restenosis of pLAD and  of RCA, admitted to CICU for management of cardiogenic shock and ADHF requiring IABP  -, with hospital course c/b vfib arrest requiring reinsertion of IABP, currently listed for transplant status 2.    ====================== NEUROLOGY=====================  Anxiety  - No Seroquel/antipsychotics since vfib arrest and prolonged QTC  - Psych Eval, recommended SSRI, but pt. refused   - Psych recommending atarax PRN  - Continue to monitor mental status    PT/Conditioning  - Continue band exercises while on bedrest s/t IABP    ==================== RESPIRATORY======================  Acute Hypoxemic Respiratory Failure  - s/p x2 intubations for cardiogenic pulm edema and the in setting of cardiac arrest, resolved - extubated 11/10  - Currently maintaining >95% sats on room air  - Continue incentive spirometry and monitoring of sp02    Asthma  - c/w symbicort  - On trelegy at home  - Continue to monitor SpO2 with goal >94%    ====================CARDIOVASCULAR==================  Vfib arrest i/s/o ischemia  - Lido gtt off   - PO Amio load - total of 5g per EP complete   - Amio dc'd  for rising LFTs  - Keep K > 4, Mag > 2.2     Cardiogenic shock requiring IABP (- , -)  - Likely 2/2 NSTEMI and ADHF  -  LHC: pLAD 100 % in-stent restenosis & mRCA, 100 %. PCWP 30. IABP placed.  -  TTE: LV dilated. EF 32 %. Regional WMAs present, mod (grade 2) LV diastolic dysfunction  -  TTE: EF 22% and + LV thrombus  - appears euvolemic  - IABP swapped to LFA , continue 1:1 support  - hydralazine 50 TID and ISD 30 TID for AL reduction  - c/w coreg 25 BID for GDMT  - James d/c'd due to elevated K levels  - UNOS status 2 as of     NSTEMI iso stent re-occlusion of pLAD and 100%  of RCA  - EKG on admission w/ LBBB  - DAPT: c/w ASA, Brilinta d/c'd per transplant w/u  - c/w lipitor 80  - cMR deferred given necessity of IABP  - CT sx not recommending CABG, undergoing AT eval  - has intermittent LBBB with no symptoms     LV thrombus  - c/w heparin gtt w/ therapeutic PTT    ===================== RENAL =========================  Non-oliguric ARIC, resolved  - Cr   - Baseline Cr: -  - Renal US: no evidence of renal artery stenosis  - Trend BMP, lytes daily, replace as needed  - Continue Strict I/Os, avoid nephrotoxins    =============== GASTROINTESTINAL===================  Constipation/ileus, resolved  - regular diet  - bowel reg prn  - last BM     ===================ENDO====================  Type 2 DM  - A1c 8.3, glucose controlled  - Continue lantus, premeal, low ISS  - continue FS monitoring    ===================HEMATOLOGIC/ONC ===================  - H/H & plts stable, no s/sx of bleeding  - Monitor H/H and plts q48h  - VTE PPX: heparin gtt    ==================INFECTIOUS DISEASE================  Positive blood culture, resolved  - Repeat BCx drawn  for leukocytosis to 12k - positive on , G+ rods in anaerobic bottle, suspect contaminant  - Repeat cultures x2 sent  per transplant ID recs - no growth to date  - Vancomycin by trough (-)  - Final microbial ID: Cutibacterium acnes, per ID this is likely to be a skin contaminant, vanc dc'd.   - Dental eval  to rule out infectious etiology that would have led to bacteremia, no significant findings on exam.     Enterococcus faecalis bacteremia, resolved  - BCx + for enterococcus faecalis x2, Staph epi x1 (likely contaminant)- pan sensitive   - Urine cx  + enterococcus faecalis  - BCx  no growth   - IABP site swapped to RFA   - s/p Vancomycin 1g q12h (-)  - CT A/P negative for infectious pathology    COVID, resolved  - Off airborne precautions     Pre-transplant ID w/u   - Trend ID recs for serologies   - Colonoscopy  - normal   - Chest CT  - improved LLL aeration  - s/p immunizations    ==================DERMATOLOGY================   Upper Extremity Rash  - Patient developed bilateral upper extremity rash after receiving Hepatitis A & B immunizations on   - Dermatology consulted  - not likely to be related to vanco  - Recommend clobetasol 0.05% BID to affected areas   - RVP repeated to rule out viral etiology, negative    Left neck mass  - US soft tissue:  lateral left neck 2.0 x 0.7 x 1.4 cm heterogeneous complex solid and cystic appearing lesion in subcutaneous soft tissue extending into subjacent strap muscle  - vasc cardiology consult: JOSE J venous duplex US ordered,  - Subcentimeter lymph nodes are present in the left neck and axilla, No evidence of left upper extremity DVT, Superficial thrombosis of the cephalic vein in the left upper arm.  - continue to monitor site    Patient requires continuous monitoring with bedside rhythm monitoring, pulse ox monitoring, and intermittent blood gas analysis. Care plan discussed with ICU care team. Patient remained critical and at risk for life threatening decompensation.  Patient seen, examined and plan discussed with CCU team during rounds.     I have personally provided ____ minutes of critical care time excluding time spent on separate procedures, in addition to initial critical care time provided by the CICU Attending, Dr. Durand .     By signing my name below, I, Sonia Preciado, attest that this documentation has been prepared under the direction and in the presence of Kirsty Davis NP  Electronically signed: Rosalba Freitas, 23 @ 19:51    I, Kirsty Davis NP, personally performed the services described in this documentation. all medical record entries made by the scribe were at my direction and in my presence. I have reviewed the chart and agree that the record reflects my personal performance and is accurate and complete  Electronically signed: Kirsty Davis NP       DEVORAH VALENCIA  MRN-[id number]  Patient is a 58y old  Male who presents with a chief complaint of cardiogenic shock (23 Dec 2023 06:34)    HPI:  pt seen and approx 1:30 pm  in [facility]    57yo M w/ hx HTN, CAD w/ 1 stent in , ICH () presenting with abn ekg. Patient presented to [facility] where he was found to have STEMI, recommended to get cath however patient did not want to get it there so it left and came here.  Patient initially had cough, congestion, fever, was placed on antibiotics on .  Started feeling nauseous and had a presyncopal event after which he presented to ED last night.  Had chest pain as well.  Chest pain is midsternal.  Not currently having chest pain.  Received 4 aspirin 30 min pta. (2023 15:11)      Hospital Course:    24 HOUR EVENTS:    REVIEW OF SYSTEMS:    CONSTITUTIONAL: feels tired today as he did not get sleep   EYES/ENT: No visual changes;  No vertigo or throat pain   NECK: No pain or stiffness  RESPIRATORY: No cough, wheezing, hemoptysis; No shortness of breath  CARDIOVASCULAR: No chest pain or palpitations  GASTROINTESTINAL: No abdominal or epigastric pain. No nausea, vomiting, or hematemesis; No diarrhea or constipation. No melena or hematochezia.  GENITOURINARY: No dysuria, frequency or hematuria  NEUROLOGICAL: No numbness or weakness  SKIN: No itching, rashes      ICU Vital Signs Last 24 Hrs  T(C): 36.7 (23 Dec 2023 15:00), Max: 36.9 (23 Dec 2023 03:00)  T(F): 98.1 (23 Dec 2023 15:00), Max: 98.4 (23 Dec 2023 03:00)  HR: 80 (23 Dec 2023 19:00) (72 - 85)  BP: --  BP(mean): --  ABP: --  ABP(mean): --  RR: 16 (23 Dec 2023 19:00) (13 - 29)  SpO2: 95% (23 Dec 2023 19:00) (94% - 100%)    O2 Parameters below as of 23 Dec 2023 19:00  Patient On (Oxygen Delivery Method): room air          I&O's Summary    22 Dec 2023 07:01  -  23 Dec 2023 07:00  --------------------------------------------------------  IN: 1316 mL / OUT: 1600 mL / NET: -284 mL    23 Dec 2023 07:  -  23 Dec 2023 19:51  --------------------------------------------------------  IN: 888 mL / OUT: 250 mL / NET: 638 mL        CAPILLARY BLOOD GLUCOSE    CAPILLARY BLOOD GLUCOSE      POCT Blood Glucose.: 129 mg/dL (23 Dec 2023 16:44)      PHYSICAL EXAM:  GENERAL: No acute distress, well-developed  HEAD:  Atraumatic, Normocephalic  EYES: EOMI, PERRLA, conjunctiva and sclera clear  NECK: no JVD  CHEST/LUNG: CTAB; No wheezes, rales, or rhonchi  HEART: Regular rate and rhythm. Normal S1/S2  ABDOMEN: Soft, non-tender, non-distended; normal bowel sounds  EXTREMITIES:  2+ peripheral pulses b/l, No clubbing, cyanosis, or edema  NEUROLOGY: A&O x 3, no focal deficits  Lines: L groin IABP clean, dry and intact. sr femoral sheath site no hematoma    ============================I/O===========================   I&O's Detail    22 Dec 2023 07:  -  23 Dec 2023 07:00  --------------------------------------------------------  IN:    Heparin: 336 mL    Oral Fluid: 980 mL  Total IN: 1316 mL    OUT:    Voided (mL): 1600 mL  Total OUT: 1600 mL    Total NET: -284 mL      23 Dec 2023 07:01  -  23 Dec 2023 19:51  --------------------------------------------------------  IN:    Heparin: 168 mL    Oral Fluid: 720 mL  Total IN: 888 mL    OUT:    Voided (mL): 250 mL  Total OUT: 250 mL    Total NET: 638 mL        ============================ LABS =========================                        12.6   8.48  )-----------( 185      ( 23 Dec 2023 01:23 )             37.7         133<L>  |  105  |  30<H>  ----------------------------<  131<H>  4.1   |  19<L>  |  1.18    Ca    10.0      23 Dec 2023 01:  Phos  3.5       Mg     1.8         TPro  6.9  /  Alb  4.0  /  TBili  0.3  /  DBili  x   /  AST  25  /  ALT  65<H>  /  AlkPhos  63                  LIVER FUNCTIONS - ( 23 Dec 2023 01:23 )  Alb: 4.0 g/dL / Pro: 6.9 g/dL / ALK PHOS: 63 U/L / ALT: 65 U/L / AST: 25 U/L / GGT: x           PT/INR - ( 23 Dec 2023 01:23 )   PT: 12.0 sec;   INR: 1.15 ratio         PTT - ( 23 Dec 2023 01:23 )  PTT:70.7 sec    Lactate, Blood: 0.6 mmol/L (23 @ 01:23)  Lactate, Blood: 0.9 mmol/L (23 @ 02:02)    Urinalysis Basic - ( 23 Dec 2023 01:23 )    Color: x / Appearance: x / SG: x / pH: x  Gluc: 131 mg/dL / Ketone: x  / Bili: x / Urobili: x   Blood: x / Protein: x / Nitrite: x   Leuk Esterase: x / RBC: x / WBC x   Sq Epi: x / Non Sq Epi: x / Bacteria: x      ======================Micro/Rad/Cardio=================  Telemtry: Reviewed   EKG: Reviewed  CXR: Reviewed  Culture: Reviewed   Echo:   Cath: Cardiac Cath Lab - Adult:   [facility]  Department of Cardiology  [address] [place] [address]  [phone]  Cath Lab Report -- Comprehensive Report  Patient: LD VALENCIA  Study date: 2017  Account number: [id number]  MR number: [id number]  : 1965  Gender: Male  Race: W  Case Physician(s):  Jairon Holguin M.D.  Referring Physician:  Luc Lynn M.D.  INDICATIONS: Unstable angina - CCS4.  HISTORY: The patient has a history of coronary artery disease. The patient  hashypertension and medication-treated dyslipidemia.  PROCEDURE:  --  Left heart catheterization with ventriculography.  --  Left coronary angiography.  --  Right coronary angiography.  TECHNIQUE: The risks and alternatives of the procedures and conscious  sedation were explained to the patient and informed consent was obtained.  Cardiac catheterization performed electively.  Local anesthetic given. Right radial artery access. A 6FR PRELUDE KIT was  inserted in the vessel. Left heart catheterization. Ventriculography was  performed. A 5FR FR4.0 EXPO catheter was utilized. Left coronary artery  angiography. The vessel was injected utilizing a 5FR FL3.5 EXPO catheter.  Right coronary artery angiography. The vessel was injected utilizing a 5FR  FR4.0 EXPO catheter. RADIATION EXPOSURE: 1.1 min.  CONTRAST GIVEN: Omnipaque 55 ml.  MEDICATIONS GIVEN: Midazolam, 1 mg, IV. Fentanyl, 25 mcg, IV. Verapamil  (Isoptin, Calan, Covera), 2.5 mg, IA. Heparin, 3000 units, IA.  VENTRICLES: Global left ventricular function was moderately depressed. EF  estimated was 40 %.  CORONARY VESSELS: The coronary circulation is right dominant.  LM:   --  LM: Normal.  LAD:   --  Proximal LAD: There was a 50 % stenosis.  CX:   --  Circumflex: Normal.  RCA:   --  Mid RCA: There was a 40 % stenosis.  --  Distal RCA: There was a 50 % stenosis.  COMPLICATIONS: There were no complications.  DIAGNOSTIC RECOMMENDATIONS: The patient should continue with the present  medications.  Prepared and signed by  Jairon Holguin M.D.  Signed 2017 12:20:13  HEMODYNAMIC TABLES  Pressures:  Baseline/ Room Air  Pressures:  - HR: 78  Pressures:  - Rhythm:  Pressures:  -- Aortic Pressure (S/D/M): --/--/99  Pressures:  -- Left Ventricle (s/edp): 157/39/--  Outputs:  Baseline/ Room Air  Outputs:  -- CALCULATIONS[age]  Outputs:  -- CALCULATIONS: Body Surface Area: 2.05  Outputs:  -- CALCULATIONS: Height in cm: 175.00  Outputs:  -- CALCULATIONS: Sex: Male  Outputs:  -- CALCULATIONS: Weight in k.40 (17 @ 21:55)    ======================================================  PAST MEDICAL & SURGICAL HISTORY:  HTN (hypertension)      CAD (coronary artery disease)  ; stent      Intracranial hemorrhage        Respiratory arrest        Myocardial infarction, unspecified MI type, unspecified artery      History of coronary artery stent placement        ====================ASSESSMENT ==============  58 male with HTN, CAD (s/p PCI ), HFrEF, CVA , and T2DM presenting with chest pressure and unknown tachycardia that was shocked x1, [facility]  found to have in-stent restenosis of pLAD and  of RCA, admitted to CICU for management of cardiogenic shock and ADHF requiring IABP  -, with hospital course c/b vfib arrest requiring reinsertion of IABP, currently listed for transplant status 2.    ====================== NEUROLOGY=====================  Anxiety  - No Seroquel/antipsychotics since vfib arrest and prolonged QTC  - Psych Eval, recommended SSRI, but pt. refused   - Psych recommending atarax PRN  - Continue to monitor mental status    PT/Conditioning  - Continue band exercises while on bedrest s/t IABP    ==================== RESPIRATORY======================  Acute Hypoxemic Respiratory Failure  - s/p x2 intubations for cardiogenic pulm edema and the in setting of cardiac arrest, resolved - extubated 11/10  - Currently maintaining >95% sats on room air  - Continue incentive spirometry and monitoring of sp02    Asthma  - c/w symbicort  - On trelegy at home  - Continue to monitor SpO2 with goal >94%    ====================CARDIOVASCULAR==================  Vfib arrest i/s/o ischemia  - Lido gtt off   - PO Amio load - total of 5g per EP complete   - Amio dc'd  for rising LFTs  - Keep K > 4, Mag > 2.2     Cardiogenic shock requiring IABP (- , -)  - Likely 2/2 NSTEMI and ADHF  -  LHC: pLAD 100 % in-stent restenosis & mRCA, 100 %. PCWP 30. IABP placed.  -  TTE: LV dilated. EF 32 %. Regional WMAs present, mod (grade 2) LV diastolic dysfunction  -  TTE: EF 22% and + LV thrombus  - appears euvolemic  - IABP swapped to LFA , continue 1:1 support  - hydralazine 50 TID and ISD 30 TID for AL reduction  - c/w coreg 25 BID for GDMT  - James d/c'd due to elevated K levels  - UNOS status 2 as of     NSTEMI iso stent re-occlusion of pLAD and 100%  of RCA  - EKG on admission w/ LBBB  - DAPT: c/w ASA, Brilinta d/c'd per transplant w/u  - c/w lipitor 80  - cMR deferred given necessity of IABP  - CT sx not recommending CABG, undergoing AT eval  - has intermittent LBBB with no symptoms     LV thrombus  - c/w heparin gtt w/ therapeutic PTT    ===================== RENAL =========================  Non-oliguric ARIC, resolved  - Cr .  - Baseline Cr: -  - Renal US: no evidence of renal artery stenosis  - Trend BMP, lytes daily, replace as needed  - Continue Strict I/Os, avoid nephrotoxins    =============== GASTROINTESTINAL===================  Constipation/ileus, resolved  - regular diet  - bowel reg prn  - last BM     ===================ENDO====================  Type 2 DM  - A1c 8.3, glucose controlled  - Continue lantus, premeal, low ISS  - continue FS monitoring    ===================HEMATOLOGIC/ONC ===================  - H/H & plts stable, no s/sx of bleeding  - Monitor H/H and plts q48h  - VTE PPX: heparin gtt    ==================INFECTIOUS DISEASE================  Positive blood culture, resolved  - Repeat BCx drawn  for leukocytosis to 12k - positive on , G+ rods in anaerobic bottle, suspect contaminant  - Repeat cultures x2 sent  per transplant ID recs - no growth to date  - Vancomycin by trough (-)  - Final microbial ID: Cutibacterium acnes, per ID this is likely to be a skin contaminant, vanc dc'd.   - Dental eval  to rule out infectious etiology that would have led to bacteremia, no significant findings on exam.     Enterococcus faecalis bacteremia, resolved  - BCx + for enterococcus faecalis x2, Staph epi x1 (likely contaminant)- pan sensitive   - Urine cx  + enterococcus faecalis  - BCx  no growth   - IABP site swapped to RFA   - s/p Vancomycin 1g q12h (-)  - CT A/P negative for infectious pathology    COVID, resolved  - Off airborne precautions     Pre-transplant ID w/u   - Trend ID recs for serologies   - Colonoscopy  - normal   - Chest CT  - improved LLL aeration  - s/p immunizations    ==================DERMATOLOGY================   Upper Extremity Rash  - Patient developed bilateral upper extremity rash after receiving Hepatitis A & B immunizations on   - Dermatology consulted  - not likely to be related to vanco  - Recommend clobetasol 0.05% BID to affected areas   - RVP repeated to rule out viral etiology, negative    Left neck mass  - US soft tissue:  lateral left neck 2.0 x 0.7 x 1.4 cm heterogeneous complex solid and cystic appearing lesion in subcutaneous soft tissue extending into subjacent strap muscle  - vasc cardiology consult: LUE venous duplex US ordered,  - Subcentimeter lymph nodes are present in the left neck and axilla, No evidence of left upper extremity DVT, Superficial thrombosis of the cephalic vein in the left upper arm.  - continue to monitor site    Patient requires continuous monitoring with bedside rhythm monitoring, pulse ox monitoring, and intermittent blood gas analysis. Care plan discussed with ICU care team. Patient remained critical and at risk for life threatening decompensation.  Patient seen, examined and plan discussed with CCU team during rounds.     I have personally provided __30__ minutes of critical care time excluding time spent on separate procedures, in addition to initial critical care time provided by the CICU Attending, Dr. Durand .     By signing my name below, I, Sonia Preciado, attest that this documentation has been prepared under the direction and in the presence of Kirsty Davis NP  Electronically signed: Rosalba Freitas, 23 @ 19:51    I, Kirsty Davis NP, personally performed the services described in this documentation. all medical record entries made by the eileenibe were at my direction and in my presence. I have reviewed the chart and agree that the record reflects my personal performance and is accurate and complete  Electronically signed: Kirsty Davis NP       DEVORAH VALENCIA  MRN-[id number]  Patient is a 58y old  Male who presents with a chief complaint of cardiogenic shock (23 Dec 2023 06:34)    HPI:  pt seen and approx 1:30 pm  in [facility]    59yo M w/ hx HTN, CAD w/ 1 stent in , ICH () presenting with abn ekg. Patient presented to [facility] where he was found to have STEMI, recommended to get cath however patient did not want to get it there so it left and came here.  Patient initially had cough, congestion, fever, was placed on antibiotics on .  Started feeling nauseous and had a presyncopal event after which he presented to ED last night.  Had chest pain as well.  Chest pain is midsternal.  Not currently having chest pain.  Received 4 aspirin 30 min pta. (2023 15:11)      Hospital Course:    24 HOUR EVENTS:    REVIEW OF SYSTEMS:    CONSTITUTIONAL: feels tired today as he did not get sleep   EYES/ENT: No visual changes;  No vertigo or throat pain   NECK: No pain or stiffness  RESPIRATORY: No cough, wheezing, hemoptysis; No shortness of breath  CARDIOVASCULAR: No chest pain or palpitations  GASTROINTESTINAL: No abdominal or epigastric pain. No nausea, vomiting, or hematemesis; No diarrhea or constipation. No melena or hematochezia.  GENITOURINARY: No dysuria, frequency or hematuria  NEUROLOGICAL: No numbness or weakness  SKIN: No itching, rashes      ICU Vital Signs Last 24 Hrs  T(C): 36.7 (23 Dec 2023 15:00), Max: 36.9 (23 Dec 2023 03:00)  T(F): 98.1 (23 Dec 2023 15:00), Max: 98.4 (23 Dec 2023 03:00)  HR: 80 (23 Dec 2023 19:00) (72 - 85)  BP: --  BP(mean): --  ABP: --  ABP(mean): --  RR: 16 (23 Dec 2023 19:00) (13 - 29)  SpO2: 95% (23 Dec 2023 19:00) (94% - 100%)    O2 Parameters below as of 23 Dec 2023 19:00  Patient On (Oxygen Delivery Method): room air          I&O's Summary    22 Dec 2023 07:01  -  23 Dec 2023 07:00  --------------------------------------------------------  IN: 1316 mL / OUT: 1600 mL / NET: -284 mL    23 Dec 2023 07:  -  23 Dec 2023 19:51  --------------------------------------------------------  IN: 888 mL / OUT: 250 mL / NET: 638 mL        CAPILLARY BLOOD GLUCOSE    CAPILLARY BLOOD GLUCOSE      POCT Blood Glucose.: 129 mg/dL (23 Dec 2023 16:44)      PHYSICAL EXAM:  GENERAL: No acute distress, well-developed  HEAD:  Atraumatic, Normocephalic  EYES: EOMI, PERRLA, conjunctiva and sclera clear  NECK: no JVD  CHEST/LUNG: CTAB; No wheezes, rales, or rhonchi  HEART: Regular rate and rhythm. Normal S1/S2  ABDOMEN: Soft, non-tender, non-distended; normal bowel sounds  EXTREMITIES:  2+ peripheral pulses b/l, No clubbing, cyanosis, or edema  NEUROLOGY: A&O x 3, no focal deficits  Lines: L groin IABP clean, dry and intact. sr femoral sheath site no hematoma    ============================I/O===========================   I&O's Detail    22 Dec 2023 07:  -  23 Dec 2023 07:00  --------------------------------------------------------  IN:    Heparin: 336 mL    Oral Fluid: 980 mL  Total IN: 1316 mL    OUT:    Voided (mL): 1600 mL  Total OUT: 1600 mL    Total NET: -284 mL      23 Dec 2023 07:01  -  23 Dec 2023 19:51  --------------------------------------------------------  IN:    Heparin: 168 mL    Oral Fluid: 720 mL  Total IN: 888 mL    OUT:    Voided (mL): 250 mL  Total OUT: 250 mL    Total NET: 638 mL        ============================ LABS =========================                        12.6   8.48  )-----------( 185      ( 23 Dec 2023 01:23 )             37.7         133<L>  |  105  |  30<H>  ----------------------------<  131<H>  4.1   |  19<L>  |  1.18    Ca    10.0      23 Dec 2023 01:  Phos  3.5       Mg     1.8         TPro  6.9  /  Alb  4.0  /  TBili  0.3  /  DBili  x   /  AST  25  /  ALT  65<H>  /  AlkPhos  63                  LIVER FUNCTIONS - ( 23 Dec 2023 01:23 )  Alb: 4.0 g/dL / Pro: 6.9 g/dL / ALK PHOS: 63 U/L / ALT: 65 U/L / AST: 25 U/L / GGT: x           PT/INR - ( 23 Dec 2023 01:23 )   PT: 12.0 sec;   INR: 1.15 ratio         PTT - ( 23 Dec 2023 01:23 )  PTT:70.7 sec    Lactate, Blood: 0.6 mmol/L (23 @ 01:23)  Lactate, Blood: 0.9 mmol/L (23 @ 02:02)    Urinalysis Basic - ( 23 Dec 2023 01:23 )    Color: x / Appearance: x / SG: x / pH: x  Gluc: 131 mg/dL / Ketone: x  / Bili: x / Urobili: x   Blood: x / Protein: x / Nitrite: x   Leuk Esterase: x / RBC: x / WBC x   Sq Epi: x / Non Sq Epi: x / Bacteria: x      ======================Micro/Rad/Cardio=================  Telemtry: Reviewed   EKG: Reviewed  CXR: Reviewed  Culture: Reviewed   Echo:   Cath: Cardiac Cath Lab - Adult:   [facility]  Department of Cardiology  [address] [place] [address]  [phone]  Cath Lab Report -- Comprehensive Report  Patient: LD VALENCIA  Study date: 2017  Account number: [id number]  MR number: [id number]  : 1965  Gender: Male  Race: W  Case Physician(s):  Jairon Holguin M.D.  Referring Physician:  Luc Lynn M.D.  INDICATIONS: Unstable angina - CCS4.  HISTORY: The patient has a history of coronary artery disease. The patient  hashypertension and medication-treated dyslipidemia.  PROCEDURE:  --  Left heart catheterization with ventriculography.  --  Left coronary angiography.  --  Right coronary angiography.  TECHNIQUE: The risks and alternatives of the procedures and conscious  sedation were explained to the patient and informed consent was obtained.  Cardiac catheterization performed electively.  Local anesthetic given. Right radial artery access. A 6FR PRELUDE KIT was  inserted in the vessel. Left heart catheterization. Ventriculography was  performed. A 5FR FR4.0 EXPO catheter was utilized. Left coronary artery  angiography. The vessel was injected utilizing a 5FR FL3.5 EXPO catheter.  Right coronary artery angiography. The vessel was injected utilizing a 5FR  FR4.0 EXPO catheter. RADIATION EXPOSURE: 1.1 min.  CONTRAST GIVEN: Omnipaque 55 ml.  MEDICATIONS GIVEN: Midazolam, 1 mg, IV. Fentanyl, 25 mcg, IV. Verapamil  (Isoptin, Calan, Covera), 2.5 mg, IA. Heparin, 3000 units, IA.  VENTRICLES: Global left ventricular function was moderately depressed. EF  estimated was 40 %.  CORONARY VESSELS: The coronary circulation is right dominant.  LM:   --  LM: Normal.  LAD:   --  Proximal LAD: There was a 50 % stenosis.  CX:   --  Circumflex: Normal.  RCA:   --  Mid RCA: There was a 40 % stenosis.  --  Distal RCA: There was a 50 % stenosis.  COMPLICATIONS: There were no complications.  DIAGNOSTIC RECOMMENDATIONS: The patient should continue with the present  medications.  Prepared and signed by  Jairon Holguin M.D.  Signed 2017 12:20:13  HEMODYNAMIC TABLES  Pressures:  Baseline/ Room Air  Pressures:  - HR: 78  Pressures:  - Rhythm:  Pressures:  -- Aortic Pressure (S/D/M): --/--/99  Pressures:  -- Left Ventricle (s/edp): 157/39/--  Outputs:  Baseline/ Room Air  Outputs:  -- CALCULATIONS[age]  Outputs:  -- CALCULATIONS: Body Surface Area: 2.05  Outputs:  -- CALCULATIONS: Height in cm: 175.00  Outputs:  -- CALCULATIONS: Sex: Male  Outputs:  -- CALCULATIONS: Weight in k.40 (17 @ 21:55)    ======================================================  PAST MEDICAL & SURGICAL HISTORY:  HTN (hypertension)      CAD (coronary artery disease)  ; stent      Intracranial hemorrhage        Respiratory arrest        Myocardial infarction, unspecified MI type, unspecified artery      History of coronary artery stent placement        ====================ASSESSMENT ==============  58 male with HTN, CAD (s/p PCI ), HFrEF, CVA , and T2DM presenting with chest pressure and unknown tachycardia that was shocked x1, [facility]  found to have in-stent restenosis of pLAD and  of RCA, admitted to CICU for management of cardiogenic shock and ADHF requiring IABP  -, with hospital course c/b vfib arrest requiring reinsertion of IABP, currently listed for transplant status 2.    ====================== NEUROLOGY=====================  Anxiety  - No Seroquel/antipsychotics since vfib arrest and prolonged QTC  - Psych Eval, recommended SSRI, but pt. refused   - Psych recommending atarax PRN  - Continue to monitor mental status    PT/Conditioning  - Continue band exercises while on bedrest s/t IABP    ==================== RESPIRATORY======================  Acute Hypoxemic Respiratory Failure  - s/p x2 intubations for cardiogenic pulm edema and the in setting of cardiac arrest, resolved - extubated 11/10  - Currently maintaining >95% sats on room air  - Continue incentive spirometry and monitoring of sp02    Asthma  - c/w symbicort  - On trelegy at home  - Continue to monitor SpO2 with goal >94%    ====================CARDIOVASCULAR==================  Vfib arrest i/s/o ischemia  - Lido gtt off   - PO Amio load - total of 5g per EP complete   - Amio dc'd  for rising LFTs  - Keep K > 4, Mag > 2.2     Cardiogenic shock requiring IABP (- , -)  - Likely 2/2 NSTEMI and ADHF  -  LHC: pLAD 100 % in-stent restenosis & mRCA, 100 %. PCWP 30. IABP placed.  -  TTE: LV dilated. EF 32 %. Regional WMAs present, mod (grade 2) LV diastolic dysfunction  -  TTE: EF 22% and + LV thrombus  - appears euvolemic  - IABP swapped to LFA , continue 1:1 support  - hydralazine 50 TID and ISD 30 TID for AL reduction  - c/w coreg 25 BID for GDMT  - James d/c'd due to elevated K levels  - UNOS status 2 as of     NSTEMI iso stent re-occlusion of pLAD and 100%  of RCA  - EKG on admission w/ LBBB  - DAPT: c/w ASA, Brilinta d/c'd per transplant w/u  - c/w lipitor 80  - cMR deferred given necessity of IABP  - CT sx not recommending CABG, undergoing AT eval  - has intermittent LBBB with no symptoms     LV thrombus  - c/w heparin gtt w/ therapeutic PTT    ===================== RENAL =========================  Non-oliguric ARIC, resolved  - Cr .  - Baseline Cr: -  - Renal US: no evidence of renal artery stenosis  - Trend BMP, lytes daily, replace as needed  - Continue Strict I/Os, avoid nephrotoxins    =============== GASTROINTESTINAL===================  Constipation/ileus, resolved  - regular diet  - bowel reg prn  - last BM     ===================ENDO====================  Type 2 DM  - A1c 8.3, glucose controlled  - Continue lantus, premeal, low ISS  - continue FS monitoring    ===================HEMATOLOGIC/ONC ===================  - H/H & plts stable, no s/sx of bleeding  - Monitor H/H and plts q48h  - VTE PPX: heparin gtt    ==================INFECTIOUS DISEASE================  Positive blood culture, resolved  - Repeat BCx drawn  for leukocytosis to 12k - positive on , G+ rods in anaerobic bottle, suspect contaminant  - Repeat cultures x2 sent  per transplant ID recs - no growth to date  - Vancomycin by trough (-)  - Final microbial ID: Cutibacterium acnes, per ID this is likely to be a skin contaminant, vanc dc'd.   - Dental eval  to rule out infectious etiology that would have led to bacteremia, no significant findings on exam.     Enterococcus faecalis bacteremia, resolved  - BCx + for enterococcus faecalis x2, Staph epi x1 (likely contaminant)- pan sensitive   - Urine cx  + enterococcus faecalis  - BCx  no growth   - IABP site swapped to RFA   - s/p Vancomycin 1g q12h (-)  - CT A/P negative for infectious pathology    COVID, resolved  - Off airborne precautions     Pre-transplant ID w/u   - Trend ID recs for serologies   - Colonoscopy  - normal   - Chest CT  - improved LLL aeration  - s/p immunizations    ==================DERMATOLOGY================   Upper Extremity Rash  - Patient developed bilateral upper extremity rash after receiving Hepatitis A & B immunizations on   - Dermatology consulted  - not likely to be related to vanco  - Recommend clobetasol 0.05% BID to affected areas   - RVP repeated to rule out viral etiology, negative    Left neck mass  - US soft tissue:  lateral left neck 2.0 x 0.7 x 1.4 cm heterogeneous complex solid and cystic appearing lesion in subcutaneous soft tissue extending into subjacent strap muscle  - vasc cardiology consult: LUE venous duplex US ordered,  - Subcentimeter lymph nodes are present in the left neck and axilla, No evidence of left upper extremity DVT, Superficial thrombosis of the cephalic vein in the left upper arm.  - continue to monitor site    Patient requires continuous monitoring with bedside rhythm monitoring, pulse ox monitoring, and intermittent blood gas analysis. Care plan discussed with ICU care team. Patient remained critical and at risk for life threatening decompensation.  Patient seen, examined and plan discussed with CCU team during rounds.     I have personally provided __30__ minutes of critical care time excluding time spent on separate procedures, in addition to initial critical care time provided by the CICU Attending, Dr. Durand .     By signing my name below, I, Sonia Preciado, attest that this documentation has been prepared under the direction and in the presence of Kirsty Davis NP  Electronically signed: Rosalba Freitas, 23 @ 19:51    I, Kirsty Davis NP, personally performed the services described in this documentation. all medical record entries made by the eileenibe were at my direction and in my presence. I have reviewed the chart and agree that the record reflects my personal performance and is accurate and complete  Electronically signed: Kirsty Davis NP

## 2023-12-23 NOTE — PROGRESS NOTE ADULT - NS MD NEURO CONDITIONS_HEART1
Acute on Chronic
Acute
Acute on Chronic

## 2023-12-23 NOTE — PROGRESS NOTE ADULT - ASSESSMENT
58 male with HTN, CAD (s/p PCI 2008), HFrEF, CVA 2018, and T2DM presenting with chest pressure and unknown tachycardia that was shocked x1, C 11/1 found to have in-stent restenosis of pLAD and  of RCA, admitted to CICU for management of cardiogenic shock and ADHF requiring IABP 11/1 -11/7, with hospital course c/b vfib arrest requiring reinsertion of IABP, currently listed for transplant status 2.    ====================== NEUROLOGY=====================  Anxiety  - No Seroquel/antipsychotics since vfib arrest and prolonged QTC  - Psych Eval, recommended SSRI, but pt. refused   - Psych recommending atarax PRN  - Continue to monitor mental status    PT/Conditioning  - Continue band exercises while on bedrest s/t IABP    ==================== RESPIRATORY======================  Acute Hypoxemic Respiratory Failure  - s/p x2 intubations for cardiogenic pulm edema and the in setting of cardiac arrest, resolved - extubated 11/10  - Currently maintaining >95% sats on room air  - Continue incentive spirometry and monitoring of sp02    Asthma  - c/w symbicort  - On trelegy at home  - Continue to monitor SpO2 with goal >94%    ====================CARDIOVASCULAR==================  Vfib arrest i/s/o ischemia  - Lido gtt off 11/13  - PO Amio load - total of 5g per EP complete 11/17  - Amio dc'd 12/5 for rising LFTs  - Keep K > 4, Mag > 2.2     Cardiogenic shock requiring IABP (11/1- 11/7, 11/9-)  - Likely 2/2 NSTEMI and ADHF  - 11/1 LHC: pLAD 100 % in-stent restenosis & mRCA, 100 %. PCWP 30. IABP placed.  - 11/1 TTE: LV dilated. EF 32 %. Regional WMAs present, mod (grade 2) LV diastolic dysfunction  - 11/2 TTE: EF 22% and + LV thrombus  - appears euvolemic  - IABP swapped to LFA 12/21, continue 1:1 support  - hydralazine 50 TID and ISD 30 TID for AL reduction  - c/w coreg 25 BID for GDMT  - James d/c'd due to elevated K levels  - UNOS status 2 as of 12/6    NSTEMI iso stent re-occlusion of pLAD and 100%  of RCA  - EKG on admission w/ LBBB  - DAPT: c/w ASA, Brilinta d/c'd per transplant w/u  - c/w lipitor 80  - cMR deferred given necessity of IABP  - CT sx not recommending CABG, undergoing AT eval    LV thrombus  - c/w heparin gtt w/ therapeutic PTT    ===================== RENAL =========================  Non-oliguric ARIC, resolved  - Cr 1.24 12/21  - Baseline Cr: 1-1.22  - Renal US: no evidence of renal artery stenosis  - Trend BMP, lytes daily, replace as needed  - Continue Strict I/Os, avoid nephrotoxins    =============== GASTROINTESTINAL===================  Constipation/ileus, resolved  - regular diet  - bowel reg prn  - last BM 12/23    ===================ENDO====================  Type 2 DM  - A1c 8.3, glucose controlled  - Continue lantus, premeal, low ISS  - continue FS monitoring    ===================HEMATOLOGIC/ONC ===================  - H/H & plts stable, no s/sx of bleeding  - Monitor H/H and plts q48h  - VTE PPX: heparin gtt    ==================INFECTIOUS DISEASE================  Positive blood culture, resolved  - Repeat BCx drawn 11/30 for leukocytosis to 12k - positive on 12/4, G+ rods in anaerobic bottle, suspect contaminant  - Repeat cultures x2 sent 12/4 per transplant ID recs - no growth to date  - Vancomycin by trough (12/4-12/6)  - Final microbial ID: Cutibacterium acnes, per ID this is likely to be a skin contaminant, vanc dc'd.   - Dental eval 12/7 to rule out infectious etiology that would have led to bacteremia, no significant findings on exam.     Enterococcus faecalis bacteremia, resolved  - BCx 11/17+ for enterococcus faecalis x2, Staph epi x1 (likely contaminant)- pan sensitive   - Urine cx 11/18 + enterococcus faecalis  - BCx 11/18 no growth   - IABP site swapped to RFA 11/20  - s/p Vancomycin 1g q12h (11/18-11/27)  - CT A/P negative for infectious pathology    COVID, resolved  - Off airborne precautions 11/11    Pre-transplant ID w/u   - Trend ID recs for serologies   - Colonoscopy 11/17 - normal   - Chest CT 11/17 - improved LLL aeration  - s/p immunizations    ==================DERMATOLOGY================   Upper Extremity Rash  - Patient developed bilateral upper extremity rash after receiving Hepatitis A & B immunizations on 11/24  - Dermatology consulted 12/5 - not likely to be related to vanco  - Recommend clobetasol 0.05% BID to affected areas   - RVP repeated to rule out viral etiology, negative    Left neck mass  - US soft tissue:  lateral left neck 2.0 x 0.7 x 1.4 cm heterogeneous complex solid and cystic appearing lesion in subcutaneous soft tissue extending into subjacent strap muscle  - vasc cardiology consult: TOMMIEE venous duplex US ordered, 12/20 - Subcentimeter lymph nodes are present in the left neck and axilla, No evidence of left upper extremity DVT, Superficial thrombosis of the cephalic vein in the left upper arm.  - continue to monitor site    Lines: RRA (12/14-), LFA IABP (12/21-   58 male with HTN, CAD (s/p PCI 2008), HFrEF, CVA 2018, and T2DM presenting with chest pressure and unknown tachycardia that was shocked x1, C 11/1 found to have in-stent restenosis of pLAD and  of RCA, admitted to CICU for management of cardiogenic shock and ADHF requiring IABP 11/1 -11/7, with hospital course c/b vfib arrest requiring reinsertion of IABP, currently listed for transplant status 2.    ====================== NEUROLOGY=====================  Anxiety  - No Seroquel/antipsychotics since vfib arrest and prolonged QTC  - Psych Eval, recommended SSRI, but pt. refused   - Psych recommending atarax PRN  - Continue to monitor mental status    PT/Conditioning  - Continue band exercises while on bedrest s/t IABP    ==================== RESPIRATORY======================  Acute Hypoxemic Respiratory Failure  - s/p x2 intubations for cardiogenic pulm edema and the in setting of cardiac arrest, resolved - extubated 11/10  - Currently maintaining >95% sats on room air  - Continue incentive spirometry and monitoring of sp02    Asthma  - c/w symbicort  - On trelegy at home  - Continue to monitor SpO2 with goal >94%    ====================CARDIOVASCULAR==================  Vfib arrest i/s/o ischemia  - Lido gtt off 11/13  - PO Amio load - total of 5g per EP complete 11/17  - Amio dc'd 12/5 for rising LFTs  - Keep K > 4, Mag > 2.2     Cardiogenic shock requiring IABP (11/1- 11/7, 11/9-)  - Likely 2/2 NSTEMI and ADHF  - 11/1 LHC: pLAD 100 % in-stent restenosis & mRCA, 100 %. PCWP 30. IABP placed.  - 11/1 TTE: LV dilated. EF 32 %. Regional WMAs present, mod (grade 2) LV diastolic dysfunction  - 11/2 TTE: EF 22% and + LV thrombus  - appears euvolemic  - IABP swapped to LFA 12/21, continue 1:1 support  - hydralazine 50 TID and ISD 30 TID for AL reduction  - c/w coreg 25 BID for GDMT  - James d/c'd due to elevated K levels  - UNOS status 2 as of 12/6    NSTEMI iso stent re-occlusion of pLAD and 100%  of RCA  - EKG on admission w/ LBBB  - DAPT: c/w ASA, Brilinta d/c'd per transplant w/u  - c/w lipitor 80  - cMR deferred given necessity of IABP  - CT sx not recommending CABG, undergoing AT eval  - has intermittent LBBB with no symptoms     LV thrombus  - c/w heparin gtt w/ therapeutic PTT    ===================== RENAL =========================  Non-oliguric ARIC, resolved  - Cr 1.24 12/21  - Baseline Cr: 1-1.22  - Renal US: no evidence of renal artery stenosis  - Trend BMP, lytes daily, replace as needed  - Continue Strict I/Os, avoid nephrotoxins    =============== GASTROINTESTINAL===================  Constipation/ileus, resolved  - regular diet  - bowel reg prn  - last BM 12/23    ===================ENDO====================  Type 2 DM  - A1c 8.3, glucose controlled  - Continue lantus, premeal, low ISS  - continue FS monitoring    ===================HEMATOLOGIC/ONC ===================  - H/H & plts stable, no s/sx of bleeding  - Monitor H/H and plts q48h  - VTE PPX: heparin gtt    ==================INFECTIOUS DISEASE================  Positive blood culture, resolved  - Repeat BCx drawn 11/30 for leukocytosis to 12k - positive on 12/4, G+ rods in anaerobic bottle, suspect contaminant  - Repeat cultures x2 sent 12/4 per transplant ID recs - no growth to date  - Vancomycin by trough (12/4-12/6)  - Final microbial ID: Cutibacterium acnes, per ID this is likely to be a skin contaminant, vanc dc'd.   - Dental eval 12/7 to rule out infectious etiology that would have led to bacteremia, no significant findings on exam.     Enterococcus faecalis bacteremia, resolved  - BCx 11/17+ for enterococcus faecalis x2, Staph epi x1 (likely contaminant)- pan sensitive   - Urine cx 11/18 + enterococcus faecalis  - BCx 11/18 no growth   - IABP site swapped to RFA 11/20  - s/p Vancomycin 1g q12h (11/18-11/27)  - CT A/P negative for infectious pathology    COVID, resolved  - Off airborne precautions 11/11    Pre-transplant ID w/u   - Trend ID recs for serologies   - Colonoscopy 11/17 - normal   - Chest CT 11/17 - improved LLL aeration  - s/p immunizations    ==================DERMATOLOGY================   Upper Extremity Rash  - Patient developed bilateral upper extremity rash after receiving Hepatitis A & B immunizations on 11/24  - Dermatology consulted 12/5 - not likely to be related to vanco  - Recommend clobetasol 0.05% BID to affected areas   - RVP repeated to rule out viral etiology, negative    Left neck mass  - US soft tissue:  lateral left neck 2.0 x 0.7 x 1.4 cm heterogeneous complex solid and cystic appearing lesion in subcutaneous soft tissue extending into subjacent strap muscle  - vasc cardiology consult: TOMMIEE venous duplex US ordered, 12/20 - Subcentimeter lymph nodes are present in the left neck and axilla, No evidence of left upper extremity DVT, Superficial thrombosis of the cephalic vein in the left upper arm.  - continue to monitor site    Lines: RRA (12/14-), LFA IABP (12/21-

## 2023-12-23 NOTE — PROGRESS NOTE ADULT - PROBLEM SELECTOR PLAN 1
-Listen UNOS status 2.   - Changed from RFA IABP to LFA IABP on 12/21. On AC with Heparin infusion (also for LV thrombus)  - Continue Coreg 25 mg BID hold for MAP <65  - Increase HDZN to 75 mg TID given MAPs in 80s and continue ISDN 30 mg TID, hold for MAP <65  - Off romel given HyperK  - AT evaluation: Accepted for transplant, currently listed UNOS Status 2. ABO A. PRA 0% 12/12.    However, downgraded to status 7 on 12/4 given + blood culture. But now re-upgraded to status 2, awaiting suitable donor  - Please keep primary Dr. Banuelos [phone] in the loop per family request. -Listen UNOS status 2.   - Changed from RFA IABP to LFA IABP on 12/21. On AC with Heparin infusion (also for LV thrombus)  - Continue Coreg 25 mg BID hold for MAP <65  - Increase HDZN to 75 mg TID given MAPs in 80s and continue ISDN 30 mg TID, hold for MAP <65  - Off romel given HyperK  - AT evaluation: Accepted for transplant, currently listed UNOS Status 2. ABO A. PRA 0% 12/12.    However, downgraded to status 7 on 12/4 given + blood culture. But now re-upgraded to status 2, awaiting suitable donor  - Please keep primary Dr. Banuelos [phone] in the loop per family request.

## 2023-12-24 ENCOUNTER — TRANSCRIPTION ENCOUNTER (OUTPATIENT)
Age: 58
End: 2023-12-24

## 2023-12-24 LAB
ALBUMIN SERPL ELPH-MCNC: 3.7 G/DL — SIGNIFICANT CHANGE UP (ref 3.3–5)
ALBUMIN SERPL ELPH-MCNC: 3.7 G/DL — SIGNIFICANT CHANGE UP (ref 3.3–5)
ALP SERPL-CCNC: 60 U/L — SIGNIFICANT CHANGE UP (ref 40–120)
ALP SERPL-CCNC: 60 U/L — SIGNIFICANT CHANGE UP (ref 40–120)
ALT FLD-CCNC: 55 U/L — HIGH (ref 10–45)
ALT FLD-CCNC: 55 U/L — HIGH (ref 10–45)
ANION GAP SERPL CALC-SCNC: 12 MMOL/L — SIGNIFICANT CHANGE UP (ref 5–17)
ANION GAP SERPL CALC-SCNC: 12 MMOL/L — SIGNIFICANT CHANGE UP (ref 5–17)
APTT BLD: 68.8 SEC — HIGH (ref 24.5–35.6)
APTT BLD: 68.8 SEC — HIGH (ref 24.5–35.6)
AST SERPL-CCNC: 25 U/L — SIGNIFICANT CHANGE UP (ref 10–40)
AST SERPL-CCNC: 25 U/L — SIGNIFICANT CHANGE UP (ref 10–40)
BASOPHILS # BLD AUTO: 0.04 K/UL — SIGNIFICANT CHANGE UP (ref 0–0.2)
BASOPHILS # BLD AUTO: 0.04 K/UL — SIGNIFICANT CHANGE UP (ref 0–0.2)
BASOPHILS NFR BLD AUTO: 0.5 % — SIGNIFICANT CHANGE UP (ref 0–2)
BASOPHILS NFR BLD AUTO: 0.5 % — SIGNIFICANT CHANGE UP (ref 0–2)
BILIRUB SERPL-MCNC: 0.3 MG/DL — SIGNIFICANT CHANGE UP (ref 0.2–1.2)
BILIRUB SERPL-MCNC: 0.3 MG/DL — SIGNIFICANT CHANGE UP (ref 0.2–1.2)
BLD GP AB SCN SERPL QL: NEGATIVE — SIGNIFICANT CHANGE UP
BLD GP AB SCN SERPL QL: NEGATIVE — SIGNIFICANT CHANGE UP
BUN SERPL-MCNC: 28 MG/DL — HIGH (ref 7–23)
BUN SERPL-MCNC: 28 MG/DL — HIGH (ref 7–23)
CALCIUM SERPL-MCNC: 9.9 MG/DL — SIGNIFICANT CHANGE UP (ref 8.4–10.5)
CALCIUM SERPL-MCNC: 9.9 MG/DL — SIGNIFICANT CHANGE UP (ref 8.4–10.5)
CHLORIDE SERPL-SCNC: 106 MMOL/L — SIGNIFICANT CHANGE UP (ref 96–108)
CHLORIDE SERPL-SCNC: 106 MMOL/L — SIGNIFICANT CHANGE UP (ref 96–108)
CO2 SERPL-SCNC: 18 MMOL/L — LOW (ref 22–31)
CO2 SERPL-SCNC: 18 MMOL/L — LOW (ref 22–31)
CREAT SERPL-MCNC: 1.2 MG/DL — SIGNIFICANT CHANGE UP (ref 0.5–1.3)
CREAT SERPL-MCNC: 1.2 MG/DL — SIGNIFICANT CHANGE UP (ref 0.5–1.3)
EGFR: 70 ML/MIN/1.73M2 — SIGNIFICANT CHANGE UP
EGFR: 70 ML/MIN/1.73M2 — SIGNIFICANT CHANGE UP
EOSINOPHIL # BLD AUTO: 0.52 K/UL — HIGH (ref 0–0.5)
EOSINOPHIL # BLD AUTO: 0.52 K/UL — HIGH (ref 0–0.5)
EOSINOPHIL NFR BLD AUTO: 6.3 % — HIGH (ref 0–6)
EOSINOPHIL NFR BLD AUTO: 6.3 % — HIGH (ref 0–6)
GLUCOSE BLDC GLUCOMTR-MCNC: 102 MG/DL — HIGH (ref 70–99)
GLUCOSE BLDC GLUCOMTR-MCNC: 102 MG/DL — HIGH (ref 70–99)
GLUCOSE BLDC GLUCOMTR-MCNC: 121 MG/DL — HIGH (ref 70–99)
GLUCOSE BLDC GLUCOMTR-MCNC: 121 MG/DL — HIGH (ref 70–99)
GLUCOSE BLDC GLUCOMTR-MCNC: 122 MG/DL — HIGH (ref 70–99)
GLUCOSE BLDC GLUCOMTR-MCNC: 122 MG/DL — HIGH (ref 70–99)
GLUCOSE BLDC GLUCOMTR-MCNC: 86 MG/DL — SIGNIFICANT CHANGE UP (ref 70–99)
GLUCOSE BLDC GLUCOMTR-MCNC: 86 MG/DL — SIGNIFICANT CHANGE UP (ref 70–99)
GLUCOSE SERPL-MCNC: 129 MG/DL — HIGH (ref 70–99)
GLUCOSE SERPL-MCNC: 129 MG/DL — HIGH (ref 70–99)
HCT VFR BLD CALC: 37.5 % — LOW (ref 39–50)
HCT VFR BLD CALC: 37.5 % — LOW (ref 39–50)
HGB BLD-MCNC: 12.5 G/DL — LOW (ref 13–17)
HGB BLD-MCNC: 12.5 G/DL — LOW (ref 13–17)
IMM GRANULOCYTES NFR BLD AUTO: 0.4 % — SIGNIFICANT CHANGE UP (ref 0–0.9)
IMM GRANULOCYTES NFR BLD AUTO: 0.4 % — SIGNIFICANT CHANGE UP (ref 0–0.9)
INR BLD: 1.25 RATIO — HIGH (ref 0.85–1.18)
INR BLD: 1.25 RATIO — HIGH (ref 0.85–1.18)
LYMPHOCYTES # BLD AUTO: 2.15 K/UL — SIGNIFICANT CHANGE UP (ref 1–3.3)
LYMPHOCYTES # BLD AUTO: 2.15 K/UL — SIGNIFICANT CHANGE UP (ref 1–3.3)
LYMPHOCYTES # BLD AUTO: 26 % — SIGNIFICANT CHANGE UP (ref 13–44)
LYMPHOCYTES # BLD AUTO: 26 % — SIGNIFICANT CHANGE UP (ref 13–44)
MCHC RBC-ENTMCNC: 29.8 PG — SIGNIFICANT CHANGE UP (ref 27–34)
MCHC RBC-ENTMCNC: 29.8 PG — SIGNIFICANT CHANGE UP (ref 27–34)
MCHC RBC-ENTMCNC: 33.3 GM/DL — SIGNIFICANT CHANGE UP (ref 32–36)
MCHC RBC-ENTMCNC: 33.3 GM/DL — SIGNIFICANT CHANGE UP (ref 32–36)
MCV RBC AUTO: 89.3 FL — SIGNIFICANT CHANGE UP (ref 80–100)
MCV RBC AUTO: 89.3 FL — SIGNIFICANT CHANGE UP (ref 80–100)
MONOCYTES # BLD AUTO: 0.77 K/UL — SIGNIFICANT CHANGE UP (ref 0–0.9)
MONOCYTES # BLD AUTO: 0.77 K/UL — SIGNIFICANT CHANGE UP (ref 0–0.9)
MONOCYTES NFR BLD AUTO: 9.3 % — SIGNIFICANT CHANGE UP (ref 2–14)
MONOCYTES NFR BLD AUTO: 9.3 % — SIGNIFICANT CHANGE UP (ref 2–14)
NEUTROPHILS # BLD AUTO: 4.76 K/UL — SIGNIFICANT CHANGE UP (ref 1.8–7.4)
NEUTROPHILS # BLD AUTO: 4.76 K/UL — SIGNIFICANT CHANGE UP (ref 1.8–7.4)
NEUTROPHILS NFR BLD AUTO: 57.5 % — SIGNIFICANT CHANGE UP (ref 43–77)
NEUTROPHILS NFR BLD AUTO: 57.5 % — SIGNIFICANT CHANGE UP (ref 43–77)
NRBC # BLD: 0 /100 WBCS — SIGNIFICANT CHANGE UP (ref 0–0)
NRBC # BLD: 0 /100 WBCS — SIGNIFICANT CHANGE UP (ref 0–0)
PLATELET # BLD AUTO: 203 K/UL — SIGNIFICANT CHANGE UP (ref 150–400)
PLATELET # BLD AUTO: 203 K/UL — SIGNIFICANT CHANGE UP (ref 150–400)
POTASSIUM SERPL-MCNC: 3.9 MMOL/L — SIGNIFICANT CHANGE UP (ref 3.5–5.3)
POTASSIUM SERPL-MCNC: 3.9 MMOL/L — SIGNIFICANT CHANGE UP (ref 3.5–5.3)
POTASSIUM SERPL-SCNC: 3.9 MMOL/L — SIGNIFICANT CHANGE UP (ref 3.5–5.3)
POTASSIUM SERPL-SCNC: 3.9 MMOL/L — SIGNIFICANT CHANGE UP (ref 3.5–5.3)
PROT SERPL-MCNC: 6.6 G/DL — SIGNIFICANT CHANGE UP (ref 6–8.3)
PROT SERPL-MCNC: 6.6 G/DL — SIGNIFICANT CHANGE UP (ref 6–8.3)
PROTHROM AB SERPL-ACNC: 13 SEC — SIGNIFICANT CHANGE UP (ref 9.5–13)
PROTHROM AB SERPL-ACNC: 13 SEC — SIGNIFICANT CHANGE UP (ref 9.5–13)
RAPID RVP RESULT: SIGNIFICANT CHANGE UP
RAPID RVP RESULT: SIGNIFICANT CHANGE UP
RBC # BLD: 4.2 M/UL — SIGNIFICANT CHANGE UP (ref 4.2–5.8)
RBC # BLD: 4.2 M/UL — SIGNIFICANT CHANGE UP (ref 4.2–5.8)
RBC # FLD: 15.4 % — HIGH (ref 10.3–14.5)
RBC # FLD: 15.4 % — HIGH (ref 10.3–14.5)
RH IG SCN BLD-IMP: POSITIVE — SIGNIFICANT CHANGE UP
RH IG SCN BLD-IMP: POSITIVE — SIGNIFICANT CHANGE UP
SARS-COV-2 RNA SPEC QL NAA+PROBE: SIGNIFICANT CHANGE UP
SARS-COV-2 RNA SPEC QL NAA+PROBE: SIGNIFICANT CHANGE UP
SODIUM SERPL-SCNC: 136 MMOL/L — SIGNIFICANT CHANGE UP (ref 135–145)
SODIUM SERPL-SCNC: 136 MMOL/L — SIGNIFICANT CHANGE UP (ref 135–145)
WBC # BLD: 8.27 K/UL — SIGNIFICANT CHANGE UP (ref 3.8–10.5)
WBC # BLD: 8.27 K/UL — SIGNIFICANT CHANGE UP (ref 3.8–10.5)
WBC # FLD AUTO: 8.27 K/UL — SIGNIFICANT CHANGE UP (ref 3.8–10.5)
WBC # FLD AUTO: 8.27 K/UL — SIGNIFICANT CHANGE UP (ref 3.8–10.5)

## 2023-12-24 PROCEDURE — 71045 X-RAY EXAM CHEST 1 VIEW: CPT | Mod: 26

## 2023-12-24 PROCEDURE — 99291 CRITICAL CARE FIRST HOUR: CPT | Mod: GC

## 2023-12-24 PROCEDURE — 99291 CRITICAL CARE FIRST HOUR: CPT

## 2023-12-24 PROCEDURE — 99292 CRITICAL CARE ADDL 30 MIN: CPT

## 2023-12-24 DEVICE — LIGATING CLIPS WECK HORIZON LARGE (ORANGE) 6: Type: IMPLANTABLE DEVICE | Status: FUNCTIONAL

## 2023-12-24 DEVICE — LIGATING CLIPS WECK HORIZON MEDIUM (BLUE) 24: Type: IMPLANTABLE DEVICE | Status: FUNCTIONAL

## 2023-12-24 DEVICE — CATH THERMODIL PACE 7.5FR: Type: IMPLANTABLE DEVICE | Status: FUNCTIONAL

## 2023-12-24 DEVICE — CANNULA W/VENT ANTEGRADE 14 GA STRL: Type: IMPLANTABLE DEVICE | Status: FUNCTIONAL

## 2023-12-24 DEVICE — KIT CVC 2LUM MAC 9FR CHG: Type: IMPLANTABLE DEVICE | Status: FUNCTIONAL

## 2023-12-24 DEVICE — CANNULA VENOUS HLS 25FR X 3/8": Type: IMPLANTABLE DEVICE | Status: FUNCTIONAL

## 2023-12-24 DEVICE — CANNULA ARTERIAL OPTISITE 22FR X 3/8" VENTED: Type: IMPLANTABLE DEVICE | Status: FUNCTIONAL

## 2023-12-24 DEVICE — INTRODUCER PERCUTANEOUS INSERTION KIT: Type: IMPLANTABLE DEVICE | Status: FUNCTIONAL

## 2023-12-24 DEVICE — CATH VENT LEFT HEART SILICONE 20FR NON-VENTED: Type: IMPLANTABLE DEVICE | Status: FUNCTIONAL

## 2023-12-24 DEVICE — SHEATH INTRODUCER TERUMO PINNACLE CORONARY 5FR X 10CM X 0.038" MINI WIRE: Type: IMPLANTABLE DEVICE | Status: FUNCTIONAL

## 2023-12-24 DEVICE — CANNULA VENOUS 1 STAGE RIGHT ANGLE METAL TIP 24FR X 3/8": Type: IMPLANTABLE DEVICE | Status: FUNCTIONAL

## 2023-12-24 DEVICE — KIT CVC 1LUM 16GX20CM BLU FLX TIP: Type: IMPLANTABLE DEVICE | Status: FUNCTIONAL

## 2023-12-24 DEVICE — CANNULA STR SELF INFLATING 3.5MM: Type: IMPLANTABLE DEVICE | Status: FUNCTIONAL

## 2023-12-24 DEVICE — CATH SILICONE THORACIC 28FR STRAIGHT: Type: IMPLANTABLE DEVICE | Status: FUNCTIONAL

## 2023-12-24 DEVICE — LIGATING CLIPS WECK HORIZON SMALL-WIDE (RED) 24: Type: IMPLANTABLE DEVICE | Status: FUNCTIONAL

## 2023-12-24 DEVICE — PACING WIRE WHITE M-22 LOOP 89MM: Type: IMPLANTABLE DEVICE | Status: FUNCTIONAL

## 2023-12-24 DEVICE — SURGIFOAM PAD 8CM X 12.5CM X 10MM (100): Type: IMPLANTABLE DEVICE | Status: FUNCTIONAL

## 2023-12-24 RX ORDER — PANTOPRAZOLE SODIUM 20 MG/1
40 TABLET, DELAYED RELEASE ORAL
Refills: 0 | Status: DISCONTINUED | OUTPATIENT
Start: 2023-12-25 | End: 2023-12-25

## 2023-12-24 RX ORDER — METHOCARBAMOL 500 MG/1
500 TABLET, FILM COATED ORAL EVERY 8 HOURS
Refills: 0 | Status: COMPLETED | OUTPATIENT
Start: 2023-12-25 | End: 2024-01-01

## 2023-12-24 RX ORDER — INSULIN GLARGINE 100 [IU]/ML
5 INJECTION, SOLUTION SUBCUTANEOUS ONCE
Refills: 0 | Status: DISCONTINUED | OUTPATIENT
Start: 2023-12-24 | End: 2023-12-24

## 2023-12-24 RX ORDER — MUPIROCIN 20 MG/G
1 OINTMENT TOPICAL
Refills: 0 | Status: COMPLETED | OUTPATIENT
Start: 2023-12-25 | End: 2023-12-30

## 2023-12-24 RX ORDER — DEXTROSE 50 % IN WATER 50 %
50 SYRINGE (ML) INTRAVENOUS
Refills: 0 | Status: DISCONTINUED | OUTPATIENT
Start: 2023-12-25 | End: 2024-01-09

## 2023-12-24 RX ORDER — PANTOPRAZOLE SODIUM 20 MG/1
40 TABLET, DELAYED RELEASE ORAL DAILY
Refills: 0 | Status: DISCONTINUED | OUTPATIENT
Start: 2023-12-25 | End: 2024-01-03

## 2023-12-24 RX ORDER — CHLORHEXIDINE GLUCONATE 213 G/1000ML
1 SOLUTION TOPICAL ONCE
Refills: 0 | Status: COMPLETED | OUTPATIENT
Start: 2023-12-24 | End: 2023-12-24

## 2023-12-24 RX ORDER — MYCOPHENOLATE MOFETIL 250 MG/1
1000 CAPSULE ORAL ONCE
Refills: 0 | Status: DISCONTINUED | OUTPATIENT
Start: 2023-12-24 | End: 2023-12-25

## 2023-12-24 RX ORDER — LIDOCAINE 4 G/100G
3 CREAM TOPICAL DAILY
Refills: 0 | Status: DISCONTINUED | OUTPATIENT
Start: 2023-12-25 | End: 2024-01-09

## 2023-12-24 RX ORDER — METOCLOPRAMIDE HCL 10 MG
10 TABLET ORAL EVERY 8 HOURS
Refills: 0 | Status: COMPLETED | OUTPATIENT
Start: 2023-12-25 | End: 2024-01-03

## 2023-12-24 RX ORDER — SENNA PLUS 8.6 MG/1
2 TABLET ORAL AT BEDTIME
Refills: 0 | Status: DISCONTINUED | OUTPATIENT
Start: 2023-12-25 | End: 2024-01-09

## 2023-12-24 RX ORDER — TRAMADOL HYDROCHLORIDE 50 MG/1
25 TABLET ORAL EVERY 8 HOURS
Refills: 0 | Status: DISCONTINUED | OUTPATIENT
Start: 2023-12-25 | End: 2024-01-01

## 2023-12-24 RX ORDER — POLYETHYLENE GLYCOL 3350 17 G/17G
17 POWDER, FOR SOLUTION ORAL DAILY
Refills: 0 | Status: DISCONTINUED | OUTPATIENT
Start: 2023-12-25 | End: 2024-01-09

## 2023-12-24 RX ORDER — DEXTROSE 50 % IN WATER 50 %
25 SYRINGE (ML) INTRAVENOUS
Refills: 0 | Status: DISCONTINUED | OUTPATIENT
Start: 2023-12-25 | End: 2023-12-26

## 2023-12-24 RX ORDER — LIDOCAINE HCL 20 MG/ML
1 VIAL (ML) INJECTION ONCE
Refills: 0 | Status: DISCONTINUED | OUTPATIENT
Start: 2023-12-24 | End: 2023-12-24

## 2023-12-24 RX ORDER — CEFEPIME 1 G/1
1000 INJECTION, POWDER, FOR SOLUTION INTRAMUSCULAR; INTRAVENOUS ONCE
Refills: 0 | Status: DISCONTINUED | OUTPATIENT
Start: 2023-12-24 | End: 2023-12-25

## 2023-12-24 RX ORDER — NALOXEGOL OXALATE 12.5 MG/1
25 TABLET, FILM COATED ORAL DAILY
Refills: 0 | Status: DISCONTINUED | OUTPATIENT
Start: 2023-12-25 | End: 2024-01-04

## 2023-12-24 RX ORDER — CHLORHEXIDINE GLUCONATE 213 G/1000ML
5 SOLUTION TOPICAL
Refills: 0 | Status: DISCONTINUED | OUTPATIENT
Start: 2023-12-24 | End: 2023-12-25

## 2023-12-24 RX ORDER — VANCOMYCIN HCL 1 G
125 VIAL (EA) INTRAVENOUS EVERY 12 HOURS
Refills: 0 | Status: DISCONTINUED | OUTPATIENT
Start: 2023-12-25 | End: 2023-12-28

## 2023-12-24 RX ORDER — INSULIN LISPRO 100/ML
VIAL (ML) SUBCUTANEOUS EVERY 6 HOURS
Refills: 0 | Status: DISCONTINUED | OUTPATIENT
Start: 2023-12-24 | End: 2023-12-25

## 2023-12-24 RX ORDER — INSULIN HUMAN 100 [IU]/ML
3 INJECTION, SOLUTION SUBCUTANEOUS
Qty: 100 | Refills: 0 | Status: DISCONTINUED | OUTPATIENT
Start: 2023-12-25 | End: 2024-01-02

## 2023-12-24 RX ORDER — SIMETHICONE 80 MG/1
160 TABLET, CHEWABLE ORAL EVERY 8 HOURS
Refills: 0 | Status: COMPLETED | OUTPATIENT
Start: 2023-12-25 | End: 2024-01-01

## 2023-12-24 RX ORDER — SODIUM CHLORIDE 9 MG/ML
1000 INJECTION INTRAMUSCULAR; INTRAVENOUS; SUBCUTANEOUS
Refills: 0 | Status: DISCONTINUED | OUTPATIENT
Start: 2023-12-25 | End: 2023-12-31

## 2023-12-24 RX ORDER — DAPTOMYCIN 500 MG/10ML
500 INJECTION, POWDER, LYOPHILIZED, FOR SOLUTION INTRAVENOUS ONCE
Refills: 0 | Status: DISCONTINUED | OUTPATIENT
Start: 2023-12-24 | End: 2023-12-25

## 2023-12-24 RX ADMIN — HEPARIN SODIUM 14 UNIT(S)/HR: 5000 INJECTION INTRAVENOUS; SUBCUTANEOUS at 08:23

## 2023-12-24 RX ADMIN — CARVEDILOL PHOSPHATE 25 MILLIGRAM(S): 80 CAPSULE, EXTENDED RELEASE ORAL at 17:18

## 2023-12-24 RX ADMIN — CHLORHEXIDINE GLUCONATE 1 APPLICATION(S): 213 SOLUTION TOPICAL at 22:09

## 2023-12-24 RX ADMIN — CHLORHEXIDINE GLUCONATE 5 MILLILITER(S): 213 SOLUTION TOPICAL at 17:20

## 2023-12-24 RX ADMIN — BUDESONIDE AND FORMOTEROL FUMARATE DIHYDRATE 2 PUFF(S): 160; 4.5 AEROSOL RESPIRATORY (INHALATION) at 14:03

## 2023-12-24 RX ADMIN — ATORVASTATIN CALCIUM 80 MILLIGRAM(S): 80 TABLET, FILM COATED ORAL at 21:43

## 2023-12-24 RX ADMIN — HEPARIN SODIUM 14 UNIT(S)/HR: 5000 INJECTION INTRAVENOUS; SUBCUTANEOUS at 05:29

## 2023-12-24 RX ADMIN — Medication 0: at 08:33

## 2023-12-24 RX ADMIN — Medication 50 MILLIGRAM(S): at 14:18

## 2023-12-24 RX ADMIN — Medication 50 MILLIGRAM(S): at 05:28

## 2023-12-24 RX ADMIN — Medication 81 MILLIGRAM(S): at 11:40

## 2023-12-24 RX ADMIN — ISOSORBIDE DINITRATE 30 MILLIGRAM(S): 5 TABLET ORAL at 16:36

## 2023-12-24 RX ADMIN — ISOSORBIDE DINITRATE 30 MILLIGRAM(S): 5 TABLET ORAL at 11:40

## 2023-12-24 RX ADMIN — Medication 9 UNIT(S): at 08:19

## 2023-12-24 RX ADMIN — CARVEDILOL PHOSPHATE 25 MILLIGRAM(S): 80 CAPSULE, EXTENDED RELEASE ORAL at 05:28

## 2023-12-24 RX ADMIN — ISOSORBIDE DINITRATE 30 MILLIGRAM(S): 5 TABLET ORAL at 05:28

## 2023-12-24 RX ADMIN — BUDESONIDE AND FORMOTEROL FUMARATE DIHYDRATE 2 PUFF(S): 160; 4.5 AEROSOL RESPIRATORY (INHALATION) at 20:49

## 2023-12-24 NOTE — PROGRESS NOTE ADULT - NS MD NEURO CONDITIONS_SHOCK
Cardiogenic Shock
Cardiogenic Shock/Septic Shock
Cardiogenic Shock/Septic Shock
Cardiogenic Shock

## 2023-12-24 NOTE — PROGRESS NOTE ADULT - ASSESSMENT
58 male with HTN, CAD (s/p PCI 2008), HFrEF, CVA 2018, and T2DM presenting with chest pressure and unknown tachycardia that was shocked x1, C 11/1 found to have in-stent restenosis of pLAD and  of RCA, admitted to CICU for management of cardiogenic shock and ADHF requiring IABP 11/1 -11/7, with hospital course c/b vfib arrest requiring reinsertion of IABP, currently listed for transplant status 2.    ====================== NEUROLOGY=====================  Anxiety  - No Seroquel/antipsychotics since vfib arrest and prolonged QTC  - Psych Eval, recommended SSRI, but pt. refused   - Psych recommending atarax PRN  - Continue to monitor mental status    PT/Conditioning  - Continue band exercises while on bedrest s/t IABP    ==================== RESPIRATORY======================  Acute Hypoxemic Respiratory Failure  - s/p x2 intubations for cardiogenic pulm edema and the in setting of cardiac arrest, resolved - extubated 11/10  - Currently maintaining >95% sats on room air  - Continue incentive spirometry and monitoring of sp02    Asthma  - c/w symbicort  - On trelegy at home  - Continue to monitor SpO2 with goal >94%    ====================CARDIOVASCULAR==================  Vfib arrest i/s/o ischemia  - Lido gtt off 11/13  - PO Amio load - total of 5g per EP complete 11/17  - Amio dc'd 12/5 for rising LFTs  - Keep K > 4, Mag > 2.2     Cardiogenic shock requiring IABP (11/1- 11/7, 11/9-)  - Likely 2/2 NSTEMI and ADHF  - 11/1 LHC: pLAD 100 % in-stent restenosis & mRCA, 100 %. PCWP 30. IABP placed.  - 11/1 TTE: LV dilated. EF 32 %. Regional WMAs present, mod (grade 2) LV diastolic dysfunction  - 11/2 TTE: EF 22% and + LV thrombus  - appears euvolemic  - IABP swapped to LFA 12/21, continue 1:1 support  - hydralazine 50 TID and ISD 30 TID for AL reduction  - c/w coreg 25 BID for GDMT  - James d/c'd due to elevated K levels  - UNOS status 2 as of 12/6    NSTEMI iso stent re-occlusion of pLAD and 100%  of RCA  - EKG on admission w/ LBBB  - DAPT: c/w ASA, Brilinta d/c'd per transplant w/u  - c/w lipitor 80  - cMR deferred given necessity of IABP  - CT sx not recommending CABG, undergoing AT eval  - has intermittent LBBB with no symptoms     LV thrombus  - c/w heparin gtt w/ therapeutic PTT    ===================== RENAL =========================  Non-oliguric ARIC, resolved  - Cr 1.24 12/21  - Baseline Cr: 1-1.22  - Renal US: no evidence of renal artery stenosis  - Trend BMP, lytes daily, replace as needed  - Continue Strict I/Os, avoid nephrotoxins    =============== GASTROINTESTINAL===================  Constipation/ileus, resolved  - regular diet  - bowel reg prn  - last BM 12/23    ===================ENDO====================  Type 2 DM  - A1c 8.3, glucose controlled  - Continue lantus, premeal, low ISS  - continue FS monitoring    ===================HEMATOLOGIC/ONC ===================  - H/H & plts stable, no s/sx of bleeding  - Monitor H/H and plts q48h  - VTE PPX: heparin gtt    ==================INFECTIOUS DISEASE================  Positive blood culture, resolved  - Repeat BCx drawn 11/30 for leukocytosis to 12k - positive on 12/4, G+ rods in anaerobic bottle, suspect contaminant  - Repeat cultures x2 sent 12/4 per transplant ID recs - no growth to date  - Vancomycin by trough (12/4-12/6)  - Final microbial ID: Cutibacterium acnes, per ID this is likely to be a skin contaminant, vanc dc'd.   - Dental eval 12/7 to rule out infectious etiology that would have led to bacteremia, no significant findings on exam.     Enterococcus faecalis bacteremia, resolved  - BCx 11/17+ for enterococcus faecalis x2, Staph epi x1 (likely contaminant)- pan sensitive   - Urine cx 11/18 + enterococcus faecalis  - BCx 11/18 no growth   - IABP site swapped to RFA 11/20  - s/p Vancomycin 1g q12h (11/18-11/27)  - CT A/P negative for infectious pathology    COVID, resolved  - Off airborne precautions 11/11    Pre-transplant ID w/u   - Trend ID recs for serologies   - Colonoscopy 11/17 - normal   - Chest CT 11/17 - improved LLL aeration  - s/p immunizations    ==================DERMATOLOGY================   Upper Extremity Rash  - Patient developed bilateral upper extremity rash after receiving Hepatitis A & B immunizations on 11/24  - Dermatology consulted 12/5 - not likely to be related to vanco  - Recommend clobetasol 0.05% BID to affected areas   - RVP repeated to rule out viral etiology, negative    Left neck mass  - US soft tissue:  lateral left neck 2.0 x 0.7 x 1.4 cm heterogeneous complex solid and cystic appearing lesion in subcutaneous soft tissue extending into subjacent strap muscle  - vasc cardiology consult: TOMMIEE venous duplex US ordered, 12/20 - Subcentimeter lymph nodes are present in the left neck and axilla, No evidence of left upper extremity DVT, Superficial thrombosis of the cephalic vein in the left upper arm.  - continue to monitor site    Lines: RRA (12/14-), LFA IABP (12/21-

## 2023-12-24 NOTE — PROGRESS NOTE ADULT - SUBJECTIVE AND OBJECTIVE BOX
DEVORAH VALENCIA  MRN-[id number]  Patient is a 58y old  Male who presents with a chief complaint of cardiogenic shock (24 Dec 2023 07:24)    HPI: 58M w/ hx HTN, CAD w/ 1 stent in , ICH () presenting with abn ekg. Patient presented to [facility] where he was found to have STEMI, recommended to get cath however patient did not want to get it there so it left and came here.  Patient initially had cough, congestion, fever, was placed on antibiotics on .  Started feeling nauseous and had a presyncopal event after which he presented to ED last night.  Had chest pain as well.  Chest pain is midsternal.  Not currently having chest pain.  Received 4 aspirin 30 min pta. (2023 15:11)    REVIEW OF SYSTEMS:  CONSTITUTIONAL: No weakness, fevers or chills  EYES/ENT: No visual changes;  No vertigo or throat pain   NECK: No pain or stiffness  RESPIRATORY: No cough, wheezing, hemoptysis; No shortness of breath  CARDIOVASCULAR: No chest pain or palpitations  GASTROINTESTINAL: No abdominal or epigastric pain  No nausea, vomiting, or hematemesis; No diarrhea or constipation. No melena or hematochezia.  GENITOURINARY: No dysuria, frequency or hematuria  NEUROLOGICAL: No numbness or weakness  SKIN: No itching, rashes    ICU Vital Signs Last 24 Hrs  T(C): 36.8 (24 Dec 2023 16:00), Max: 37 (24 Dec 2023 07:00)  T(F): 98.3 (24 Dec 2023 16:00), Max: 98.6 (24 Dec 2023 07:00)  HR: 79 (24 Dec 2023 18:00) (73 - 96)  BP: 94/48 (24 Dec 2023 15:23) (94/48 - 94/48)  BP(mean): 63 (24 Dec 2023 15:23) (63 - 63)  RR: 16 (24 Dec 2023 18:00) (12 - 25)  SpO2: 95% (24 Dec 2023 15:23) (95% - 98%)    O2 Parameters below as of 24 Dec 2023 15:23    O2 Flow (L/min): 2  O2 Concentration (%): 40      I&O's Summary    23 Dec 2023 07:01  -  24 Dec 2023 07:00  --------------------------------------------------------  IN: 1256 mL / OUT: 1350 mL / NET: -94 mL    24 Dec 2023 07:01  -  24 Dec 2023 19:24  --------------------------------------------------------  IN: 334 mL / OUT: 400 mL / NET: -66 mL      CAPILLARY BLOOD GLUCOSE  POCT Blood Glucose.: 121 mg/dL (24 Dec 2023 16:15)  ============================I/O===========================   I&O's Detail    23 Dec 2023 07:  -  24 Dec 2023 07:00  --------------------------------------------------------  IN:    Heparin: 336 mL    Oral Fluid: 920 mL  Total IN: 1256 mL    OUT:    Voided (mL): 1350 mL  Total OUT: 1350 mL    Total NET: -94 mL      24 Dec 2023 07:01  -  24 Dec 2023 19:24  --------------------------------------------------------  IN:    Heparin: 154 mL    Oral Fluid: 180 mL  Total IN: 334 mL    OUT:    Voided (mL): 400 mL  Total OUT: 400 mL    Total NET: -66 mL  ============================ LABS =========================                     12.5   8.27  )-----------( 203      ( 24 Dec 2023 17:10 )             37.5         136  |  106  |  28<H>  ----------------------------<  129<H>  3.9   |  18<L>  |  1.20    Ca    9.9      24 Dec 2023 17:10  Phos  3.5       Mg     1.8         TPro  6.6  /  Alb  3.7  /  TBili  0.3  /  DBili  x   /  AST  25  /  ALT  55<H>  /  AlkPhos  60      LIVER FUNCTIONS - ( 24 Dec 2023 17:10 )  Alb: 3.7 g/dL / Pro: 6.6 g/dL / ALK PHOS: 60 U/L / ALT: 55 U/L / AST: 25 U/L / GGT: x           PT/INR - ( 24 Dec 2023 17:10 )   PT: 13.0 sec;   INR: 1.25 ratio    PTT - ( 24 Dec 2023 17:10 )  PTT:68.8 sec    Lactate, Blood: 0.6 mmol/L (23 @ 01:23)    Urinalysis Basic - ( 24 Dec 2023 17:10 )    Color: x / Appearance: x / SG: x / pH: x  Gluc: 129 mg/dL / Ketone: x  / Bili: x / Urobili: x   Blood: x / Protein: x / Nitrite: x   Leuk Esterase: x / RBC: x / WBC x   Sq Epi: x / Non Sq Epi: x / Bacteria: x  ======================Micro/Rad/Cardio=================  Telemetry: Reviewed   EKG: Reviewed  CXR: Reviewed  Culture: Reviewed   Echo:   Cath: Cardiac Cath Lab - Adult:   [facility]  Department of Cardiology  [address] [place] [address]  [phone]  Cath Lab Report -- Comprehensive Report  Patient: LD VALENCIA  Study date: 2017  Account number: [id number]  MR number: [id number]  : 1965  Gender: Male  Race: W  Case Physician(s):  Jairon Holguin M.D.  Referring Physician:  Luc Lynn M.D.  INDICATIONS: Unstable angina - CCS4.  HISTORY: The patient has a history of coronary artery disease. The patient  hashypertension and medication-treated dyslipidemia.  PROCEDURE:  --  Left heart catheterization with ventriculography.  --  Left coronary angiography.  --  Right coronary angiography.  TECHNIQUE: The risks and alternatives of the procedures and conscious  sedation were explained to the patient and informed consent was obtained.  Cardiac catheterization performed electively.  Local anesthetic given. Right radial artery access. A 6FR PRELUDE KIT was  inserted in the vessel. Left heart catheterization. Ventriculography was  performed. A 5FR FR4.0 EXPO catheter was utilized. Left coronary artery  angiography. The vessel was injected utilizing a 5FR FL3.5 EXPO catheter.  Right coronary artery angiography. The vessel was injected utilizing a 5FR  FR4.0 EXPO catheter. RADIATION EXPOSURE: 1.1 min.  CONTRAST GIVEN: Omnipaque 55 ml.  MEDICATIONS GIVEN: Midazolam, 1 mg, IV. Fentanyl, 25 mcg, IV. Verapamil  (Isoptin, Calan, Covera), 2.5 mg, IA. Heparin, 3000 units, IA.  VENTRICLES: Global left ventricular function was moderately depressed. EF  estimated was 40 %.  CORONARY VESSELS: The coronary circulation is right dominant.  LM:   --  LM: Normal.  LAD:   --  Proximal LAD: There was a 50 % stenosis.  CX:   --  Circumflex: Normal.  RCA:   --  Mid RCA: There was a 40 % stenosis.  --  Distal RCA: There was a 50 % stenosis.  COMPLICATIONS: There were no complications.  DIAGNOSTIC RECOMMENDATIONS: The patient should continue with the present  medications.  Prepared and signed by  Jairon Holguin M.D.  Signed 2017 12:20:13  HEMODYNAMIC TABLES  Pressures:  Baseline/ Room Air  Pressures:  - HR: 78  Pressures:  - Rhythm:  Pressures:  -- Aortic Pressure (S/D/M): --/--/99  Pressures:  -- Left Ventricle (s/edp): 157/39/--  Outputs:  Baseline/ Room Air  Outputs:  -- CALCULATIONS[age]  Outputs:  -- CALCULATIONS: Body Surface Area: 2.05  Outputs:  -- CALCULATIONS: Height in cm: 175.00  Outputs:  -- CALCULATIONS: Sex: Male  Outputs:  -- CALCULATIONS: Weight in k.40 (17 @ 21:55)  ======================================================  PAST MEDICAL & SURGICAL HISTORY:  HTN (hypertension)      CAD (coronary artery disease)  ; stent      Intracranial hemorrhage        Respiratory arrest        Myocardial infarction, unspecified MI type, unspecified artery      History of coronary artery stent placement    A/P: 58 male with HTN, CAD (s/p PCI ), HFrEF, CVA , and T2DM presenting with chest pressure and unknown tachycardia that was shocked x1, [facility]  found to have in-stent restenosis of pLAD and  of RCA, admitted to CICU for management of cardiogenic shock and ADHF requiring IABP  -, with hospital course c/b vfib arrest requiring reinsertion of IABP, currently listed for transplant status 2.    ====================== NEUROLOGY=====================  Anxiety  - No Seroquel/antipsychotics since vfib arrest and prolonged QTC  - Psych Eval, recommended SSRI, but pt. refused   - Psych recommending atarax PRN  - Continue to monitor mental status    PT/Conditioning  - Continue band exercises while on bedrest s/t IABP    ==================== RESPIRATORY======================  Acute Hypoxemic Respiratory Failure  - s/p x2 intubations for cardiogenic pulm edema and the in setting of cardiac arrest, resolved - extubated 11/10  - Currently maintaining >95% sats on room air  - Continue incentive spirometry and monitoring of sp02    Asthma  - c/w symbicort  - On trelegy at home  - Continue to monitor SpO2 with goal >94%    ====================CARDIOVASCULAR==================  Vfib arrest i/s/o ischemia  - Lido gtt off   - PO Amio load - total of 5g per EP complete   - Amio dc'd  for rising LFTs  - Keep K > 4, Mag > 2.2     Cardiogenic shock requiring IABP (- , -)  - Likely 2/2 NSTEMI and ADHF  -  LHC: pLAD 100 % in-stent restenosis & mRCA, 100 %. PCWP 30. IABP placed.  -  TTE: LV dilated. EF 32 %. Regional WMAs present, mod (grade 2) LV diastolic dysfunction  -  TTE: EF 22% and + LV thrombus  - appears euvolemic  - IABP swapped to LFA , continue 1:1 support  - hydralazine 50 TID and ISD 30 TID for AL reduction  - c/w coreg 25 BID for GDMT  - James d/c'd due to elevated K levels  - UNOS status 2 as of     NSTEMI iso stent re-occlusion of pLAD and 100%  of RCA  - EKG on admission w/ LBBB  - DAPT: c/w ASA, Brilinta d/c'd per transplant w/u  - c/w lipitor 80  - cMR deferred given necessity of IABP  - CT sx not recommending CABG, undergoing AT eval  - has intermittent LBBB with no symptoms     LV thrombus  - c/w heparin gtt w/ therapeutic PTT    ===================== RENAL =========================  Non-oliguric ARIC, resolved  - Cr   - Baseline Cr: 1-  - Renal US: no evidence of renal artery stenosis  - Trend BMP, lytes daily, replace as needed  - Continue Strict I/Os, avoid nephrotoxins    =============== GASTROINTESTINAL===================  Constipation/ileus, resolved  - NPO: pending heart transplant tonight  - bowel reg prn  - last BM     ===================ENDO====================  Type 2 DM  - A1c 8.3, glucose controlled  - Continue lantus, premeal, low ISS  - continue FS monitoring    ===================HEMATOLOGIC/ONC ===================  - H/H & plts stable, no s/sx of bleeding  - Monitor H/H and plts q48h  - VTE PPX: heparin gtt    ==================INFECTIOUS DISEASE================  Positive blood culture, resolved  - Repeat BCx drawn  for leukocytosis to 12k - positive on , G+ rods in anaerobic bottle, suspect contaminant  - Repeat cultures x2 sent  per transplant ID recs - no growth to date  - Vancomycin by trough (-)  - Final microbial ID: Cutibacterium acnes, per ID this is likely to be a skin contaminant, vanc dc'd.   - Dental eval  to rule out infectious etiology that would have led to bacteremia, no significant findings on exam.     Enterococcus faecalis bacteremia, resolved  - BCx + for enterococcus faecalis x2, Staph epi x1 (likely contaminant)- pan sensitive   - Urine cx  + enterococcus faecalis  - BCx  no growth   - IABP site swapped to RFA   - s/p Vancomycin 1g q12h (-)  - CT A/P negative for infectious pathology    COVID, resolved  - Off airborne precautions     Pre-transplant ID w/u   - Trend ID recs for serologies   - Colonoscopy  - normal   - Chest CT  - improved LLL aeration  - s/p immunizations    ==================DERMATOLOGY================   Upper Extremity Rash  - Patient developed bilateral upper extremity rash after receiving Hepatitis A & B immunizations on   - Dermatology consulted 12/5 - not likely to be related to vanco  - Recommend clobetasol 0.05% BID to affected areas   - RVP repeated to rule out viral etiology, negative    Left neck mass  - US soft tissue:  lateral left neck 2.0 x 0.7 x 1.4 cm heterogeneous complex solid and cystic appearing lesion in subcutaneous soft tissue extending into subjacent strap muscle  - vasc cardiology consult: JOSE J venous duplex US ordered,  - Subcentimeter lymph nodes are present in the left neck and axilla, No evidence of left upper extremity DVT, Superficial thrombosis of the cephalic vein in the left upper arm.  - continue to monitor site    Lines: RRA (-), LFA IABP (-)  ======================= DISPOSITION  =====================  [X] Critical   [ ] Guarded    [ ] Stable    [X] Maintain in CICU  [ ] Downgrade to Telemetry  [ ] Discharge Home    Patient requires continuous monitoring with bedside rhythm monitoring, pulse ox monitoring, and intermittent blood gas analysis. Care plan discussed with ICU care team. Patient remained critical and at risk for life threatening decompensation.  Patient seen, examined and plan discussed with CCU team during rounds.     I have personally provided 30 minutes of critical care time excluding time spent on separate procedures, in addition to initial critical care time provided by the CICU Attending, Dr. Ladarius Hawthorne [facility] [phone] DEVORAH VALENCIA  MRN-[id number]  Patient is a 58y old  Male who presents with a chief complaint of cardiogenic shock (24 Dec 2023 07:24)    HPI: 58M w/ hx HTN, CAD w/ 1 stent in , ICH () presenting with abn ekg. Patient presented to [facility] where he was found to have STEMI, recommended to get cath however patient did not want to get it there so it left and came here.  Patient initially had cough, congestion, fever, was placed on antibiotics on .  Started feeling nauseous and had a presyncopal event after which he presented to ED last night.  Had chest pain as well.  Chest pain is midsternal.  Not currently having chest pain.  Received 4 aspirin 30 min pta. (2023 15:11)    REVIEW OF SYSTEMS:  CONSTITUTIONAL: No weakness, fevers or chills  EYES/ENT: No visual changes;  No vertigo or throat pain   NECK: No pain or stiffness  RESPIRATORY: No cough, wheezing, hemoptysis; No shortness of breath  CARDIOVASCULAR: No chest pain or palpitations  GASTROINTESTINAL: No abdominal or epigastric pain  No nausea, vomiting, or hematemesis; No diarrhea or constipation. No melena or hematochezia.  GENITOURINARY: No dysuria, frequency or hematuria  NEUROLOGICAL: No numbness or weakness  SKIN: No itching, rashes    ICU Vital Signs Last 24 Hrs  T(C): 36.8 (24 Dec 2023 16:00), Max: 37 (24 Dec 2023 07:00)  T(F): 98.3 (24 Dec 2023 16:00), Max: 98.6 (24 Dec 2023 07:00)  HR: 79 (24 Dec 2023 18:00) (73 - 96)  BP: 94/48 (24 Dec 2023 15:23) (94/48 - 94/48)  BP(mean): 63 (24 Dec 2023 15:23) (63 - 63)  RR: 16 (24 Dec 2023 18:00) (12 - 25)  SpO2: 95% (24 Dec 2023 15:23) (95% - 98%)    O2 Parameters below as of 24 Dec 2023 15:23    O2 Flow (L/min): 2  O2 Concentration (%): 40      I&O's Summary    23 Dec 2023 07:01  -  24 Dec 2023 07:00  --------------------------------------------------------  IN: 1256 mL / OUT: 1350 mL / NET: -94 mL    24 Dec 2023 07:01  -  24 Dec 2023 19:24  --------------------------------------------------------  IN: 334 mL / OUT: 400 mL / NET: -66 mL      CAPILLARY BLOOD GLUCOSE  POCT Blood Glucose.: 121 mg/dL (24 Dec 2023 16:15)  ============================I/O===========================   I&O's Detail    23 Dec 2023 07:  -  24 Dec 2023 07:00  --------------------------------------------------------  IN:    Heparin: 336 mL    Oral Fluid: 920 mL  Total IN: 1256 mL    OUT:    Voided (mL): 1350 mL  Total OUT: 1350 mL    Total NET: -94 mL      24 Dec 2023 07:01  -  24 Dec 2023 19:24  --------------------------------------------------------  IN:    Heparin: 154 mL    Oral Fluid: 180 mL  Total IN: 334 mL    OUT:    Voided (mL): 400 mL  Total OUT: 400 mL    Total NET: -66 mL  ============================ LABS =========================                     12.5   8.27  )-----------( 203      ( 24 Dec 2023 17:10 )             37.5         136  |  106  |  28<H>  ----------------------------<  129<H>  3.9   |  18<L>  |  1.20    Ca    9.9      24 Dec 2023 17:10  Phos  3.5       Mg     1.8         TPro  6.6  /  Alb  3.7  /  TBili  0.3  /  DBili  x   /  AST  25  /  ALT  55<H>  /  AlkPhos  60      LIVER FUNCTIONS - ( 24 Dec 2023 17:10 )  Alb: 3.7 g/dL / Pro: 6.6 g/dL / ALK PHOS: 60 U/L / ALT: 55 U/L / AST: 25 U/L / GGT: x           PT/INR - ( 24 Dec 2023 17:10 )   PT: 13.0 sec;   INR: 1.25 ratio    PTT - ( 24 Dec 2023 17:10 )  PTT:68.8 sec    Lactate, Blood: 0.6 mmol/L (23 @ 01:23)    Urinalysis Basic - ( 24 Dec 2023 17:10 )    Color: x / Appearance: x / SG: x / pH: x  Gluc: 129 mg/dL / Ketone: x  / Bili: x / Urobili: x   Blood: x / Protein: x / Nitrite: x   Leuk Esterase: x / RBC: x / WBC x   Sq Epi: x / Non Sq Epi: x / Bacteria: x  ======================Micro/Rad/Cardio=================  Telemetry: Reviewed   EKG: Reviewed  CXR: Reviewed  Culture: Reviewed   Echo:   Cath: Cardiac Cath Lab - Adult:   [facility]  Department of Cardiology  [address] [place] [address]  [phone]  Cath Lab Report -- Comprehensive Report  Patient: LD VALENCIA  Study date: 2017  Account number: [id number]  MR number: [id number]  : 1965  Gender: Male  Race: W  Case Physician(s):  Jairon Holguin M.D.  Referring Physician:  Luc Lynn M.D.  INDICATIONS: Unstable angina - CCS4.  HISTORY: The patient has a history of coronary artery disease. The patient  hashypertension and medication-treated dyslipidemia.  PROCEDURE:  --  Left heart catheterization with ventriculography.  --  Left coronary angiography.  --  Right coronary angiography.  TECHNIQUE: The risks and alternatives of the procedures and conscious  sedation were explained to the patient and informed consent was obtained.  Cardiac catheterization performed electively.  Local anesthetic given. Right radial artery access. A 6FR PRELUDE KIT was  inserted in the vessel. Left heart catheterization. Ventriculography was  performed. A 5FR FR4.0 EXPO catheter was utilized. Left coronary artery  angiography. The vessel was injected utilizing a 5FR FL3.5 EXPO catheter.  Right coronary artery angiography. The vessel was injected utilizing a 5FR  FR4.0 EXPO catheter. RADIATION EXPOSURE: 1.1 min.  CONTRAST GIVEN: Omnipaque 55 ml.  MEDICATIONS GIVEN: Midazolam, 1 mg, IV. Fentanyl, 25 mcg, IV. Verapamil  (Isoptin, Calan, Covera), 2.5 mg, IA. Heparin, 3000 units, IA.  VENTRICLES: Global left ventricular function was moderately depressed. EF  estimated was 40 %.  CORONARY VESSELS: The coronary circulation is right dominant.  LM:   --  LM: Normal.  LAD:   --  Proximal LAD: There was a 50 % stenosis.  CX:   --  Circumflex: Normal.  RCA:   --  Mid RCA: There was a 40 % stenosis.  --  Distal RCA: There was a 50 % stenosis.  COMPLICATIONS: There were no complications.  DIAGNOSTIC RECOMMENDATIONS: The patient should continue with the present  medications.  Prepared and signed by  Jairon Holguin M.D.  Signed 2017 12:20:13  HEMODYNAMIC TABLES  Pressures:  Baseline/ Room Air  Pressures:  - HR: 78  Pressures:  - Rhythm:  Pressures:  -- Aortic Pressure (S/D/M): --/--/99  Pressures:  -- Left Ventricle (s/edp): 157/39/--  Outputs:  Baseline/ Room Air  Outputs:  -- CALCULATIONS[age]  Outputs:  -- CALCULATIONS: Body Surface Area: 2.05  Outputs:  -- CALCULATIONS: Height in cm: 175.00  Outputs:  -- CALCULATIONS: Sex: Male  Outputs:  -- CALCULATIONS: Weight in k.40 (17 @ 21:55)  ======================================================  PAST MEDICAL & SURGICAL HISTORY:  HTN (hypertension)      CAD (coronary artery disease)  ; stent      Intracranial hemorrhage        Respiratory arrest        Myocardial infarction, unspecified MI type, unspecified artery      History of coronary artery stent placement    A/P: 58 male with HTN, CAD (s/p PCI ), HFrEF, CVA , and T2DM presenting with chest pressure and unknown tachycardia that was shocked x1, [facility]  found to have in-stent restenosis of pLAD and  of RCA, admitted to CICU for management of cardiogenic shock and ADHF requiring IABP  -, with hospital course c/b vfib arrest requiring reinsertion of IABP, currently listed for transplant status 2.    ====================== NEUROLOGY=====================  Anxiety  - No Seroquel/antipsychotics since vfib arrest and prolonged QTC  - Psych Eval, recommended SSRI, but pt. refused   - Psych recommending atarax PRN  - Continue to monitor mental status    PT/Conditioning  - Continue band exercises while on bedrest s/t IABP    ==================== RESPIRATORY======================  Acute Hypoxemic Respiratory Failure  - s/p x2 intubations for cardiogenic pulm edema and the in setting of cardiac arrest, resolved - extubated 11/10  - Currently maintaining >95% sats on room air  - Continue incentive spirometry and monitoring of sp02    Asthma  - c/w symbicort  - On trelegy at home  - Continue to monitor SpO2 with goal >94%    ====================CARDIOVASCULAR==================  Vfib arrest i/s/o ischemia  - Lido gtt off   - PO Amio load - total of 5g per EP complete   - Amio dc'd  for rising LFTs  - Keep K > 4, Mag > 2.2     Cardiogenic shock requiring IABP (- , -)  - Likely 2/2 NSTEMI and ADHF  -  LHC: pLAD 100 % in-stent restenosis & mRCA, 100 %. PCWP 30. IABP placed.  -  TTE: LV dilated. EF 32 %. Regional WMAs present, mod (grade 2) LV diastolic dysfunction  -  TTE: EF 22% and + LV thrombus  - appears euvolemic  - IABP swapped to LFA , continue 1:1 support  - hydralazine 50 TID and ISD 30 TID for AL reduction  - c/w coreg 25 BID for GDMT  - James d/c'd due to elevated K levels  - UNOS status 2 as of     NSTEMI iso stent re-occlusion of pLAD and 100%  of RCA  - EKG on admission w/ LBBB  - DAPT: c/w ASA, Brilinta d/c'd per transplant w/u  - c/w lipitor 80  - cMR deferred given necessity of IABP  - CT sx not recommending CABG, undergoing AT eval  - has intermittent LBBB with no symptoms     LV thrombus  - c/w heparin gtt w/ therapeutic PTT    ===================== RENAL =========================  Non-oliguric ARIC, resolved  - Cr   - Baseline Cr: 1-  - Renal US: no evidence of renal artery stenosis  - Trend BMP, lytes daily, replace as needed  - Continue Strict I/Os, avoid nephrotoxins    =============== GASTROINTESTINAL===================  Constipation/ileus, resolved  - NPO: pending heart transplant tonight  - bowel reg prn  - last BM     ===================ENDO====================  Type 2 DM  - A1c 8.3, glucose controlled  - Continue lantus, premeal, low ISS  - continue FS monitoring    ===================HEMATOLOGIC/ONC ===================  - H/H & plts stable, no s/sx of bleeding  - Monitor H/H and plts q48h  - VTE PPX: heparin gtt    ==================INFECTIOUS DISEASE================  Positive blood culture, resolved  - Repeat BCx drawn  for leukocytosis to 12k - positive on , G+ rods in anaerobic bottle, suspect contaminant  - Repeat cultures x2 sent  per transplant ID recs - no growth to date  - Vancomycin by trough (-)  - Final microbial ID: Cutibacterium acnes, per ID this is likely to be a skin contaminant, vanc dc'd.   - Dental eval  to rule out infectious etiology that would have led to bacteremia, no significant findings on exam.     Enterococcus faecalis bacteremia, resolved  - BCx + for enterococcus faecalis x2, Staph epi x1 (likely contaminant)- pan sensitive   - Urine cx  + enterococcus faecalis  - BCx  no growth   - IABP site swapped to RFA   - s/p Vancomycin 1g q12h (-)  - CT A/P negative for infectious pathology    COVID, resolved  - Off airborne precautions     Pre-transplant ID w/u   - Trend ID recs for serologies   - Colonoscopy  - normal   - Chest CT  - improved LLL aeration  - s/p immunizations    ==================DERMATOLOGY================   Upper Extremity Rash  - Patient developed bilateral upper extremity rash after receiving Hepatitis A & B immunizations on   - Dermatology consulted 12/5 - not likely to be related to vanco  - Recommend clobetasol 0.05% BID to affected areas   - RVP repeated to rule out viral etiology, negative    Left neck mass  - US soft tissue:  lateral left neck 2.0 x 0.7 x 1.4 cm heterogeneous complex solid and cystic appearing lesion in subcutaneous soft tissue extending into subjacent strap muscle  - vasc cardiology consult: JOSE J venous duplex US ordered,  - Subcentimeter lymph nodes are present in the left neck and axilla, No evidence of left upper extremity DVT, Superficial thrombosis of the cephalic vein in the left upper arm.  - continue to monitor site    Lines: RRA (-), LFA IABP (-)  ======================= DISPOSITION  =====================  [X] Critical   [ ] Guarded    [ ] Stable    [X] Maintain in CICU  [ ] Downgrade to Telemetry  [ ] Discharge Home    Patient requires continuous monitoring with bedside rhythm monitoring, pulse ox monitoring, and intermittent blood gas analysis. Care plan discussed with ICU care team. Patient remained critical and at risk for life threatening decompensation.  Patient seen, examined and plan discussed with CCU team during rounds.     I have personally provided 30 minutes of critical care time excluding time spent on separate procedures, in addition to initial critical care time provided by the CICU Attending, Dr. Ladarius Hawthorne [facility] [phone]

## 2023-12-24 NOTE — PROGRESS NOTE ADULT - SUBJECTIVE AND OBJECTIVE BOX
Subjective:  No acute events.    Medications:  aspirin  chewable 81 milliGRAM(s) Oral daily  atorvastatin 80 milliGRAM(s) Oral at bedtime  bisacodyl 5 milliGRAM(s) Oral every 12 hours  budesonide  80 MICROgram(s)/formoterol 4.5 MICROgram(s) Inhaler 2 Puff(s) Inhalation two times a day  carvedilol 25 milliGRAM(s) Oral every 12 hours  chlorhexidine 2% Cloths 1 Application(s) Topical daily  heparin  Infusion 1300 Unit(s)/Hr IV Continuous <Continuous>  hepatitis B Vaccine - Adult (HEPLISAV-B) 20 MICROGram(s) IntraMuscular once  hydrALAZINE 50 milliGRAM(s) Oral every 8 hours  hydrOXYzine hydrochloride 25 milliGRAM(s) Oral two times a day PRN  insulin glargine Injectable (LANTUS) 12 Unit(s) SubCutaneous at bedtime  insulin lispro (ADMELOG) corrective regimen sliding scale   SubCutaneous at bedtime  insulin lispro (ADMELOG) corrective regimen sliding scale   SubCutaneous three times a day before meals  insulin lispro Injectable (ADMELOG) 9 Unit(s) SubCutaneous three times a day before meals  isosorbide   dinitrate Tablet (ISORDIL) 30 milliGRAM(s) Oral three times a day  polyethylene glycol 3350 17 Gram(s) Oral two times a day  senna 2 Tablet(s) Oral at bedtime    Vitals:  T(C): 36.6 (12-24-23 @ 08:00), Max: 37 (12-24-23 @ 07:00)  HR: 81 (12-24-23 @ 12:00) (73 - 96)  RR: 22 (12-24-23 @ 12:00) (12 - 25)  SpO2: 97% (12-24-23 @ 10:00) (94% - 97%)      I&O's Summary    23 Dec 2023 07:01  -  24 Dec 2023 07:00  --------------------------------------------------------  IN: 1256 mL / OUT: 1350 mL / NET: -94 mL    24 Dec 2023 07:01  -  24 Dec 2023 12:45  --------------------------------------------------------  IN: 130 mL / OUT: 400 mL / NET: -270 mL        Physical Exam:  General: No distress. Comfortable.  HEENT: EOM intact.  Neck: Neck supple. JVP not elevated. No masses  Chest: Clear to auscultation bilaterally  CV: Normal S1 and S2. No murmurs, rub, or gallops. Radial pulses normal. R PT palpable, L DP palpable  Abdomen: Soft, non-distended, non-tender  Skin: R fem prior IABP site with old blood at site. Dressing at L IABP site intact, site without drainage or hematoma.   Neurology: Alert and oriented times three. Sensation intact  Psych: Affect normal        Labs:                        12.6   8.48  )-----------( 185      ( 23 Dec 2023 01:23 )             37.7     12-23    133<L>  |  105  |  30<H>  ----------------------------<  131<H>  4.1   |  19<L>  |  1.18    Ca    10.0      23 Dec 2023 01:23  Phos  3.5     12-23  Mg     1.8     12-23    TPro  6.9  /  Alb  4.0  /  TBili  0.3  /  DBili  x   /  AST  25  /  ALT  65<H>  /  AlkPhos  63  12-23      Lactate, Blood: 0.6 mmol/L (12-23 @ 01:23)

## 2023-12-24 NOTE — PROGRESS NOTE ADULT - ASSESSMENT
59 yo male h/o htn, cad s/p pci, ICH, here with NSTEMI  s/p intubation and cath. now in CCU    NSTEMI  s/p  cath  cath results noted. multi vessel dz., CTSx f/u.   hep gtt  pt with vtach/fib arrest again on 11/8. s/p ACLS and ROSC.   now extubated  IABP in place  mngt as per CCU  plan for transplant as per HF and transplant team  transplant w/u ongoing.   s/p colonoscopy 11/17. normal  now listed for transplant as of 11/22    LV thrombus   hep gtt    acute on chronic systolic heart failure  heart failure team f/u    pna  resolved     covid  resolved  now off isolation     h/o ICH  resolved    agitation  psy consult f/u    bacteremia  id following  iv abx  f/u repeat cult  - ngtd    vomiting and abd pain  ileus on CT  bowel regimen  gi f/u  +bm    now resolved.     IABP malposition on CT  mngt as per ccu. iabp adjusted    rash  possible drug reaction  derm f/u  improving    mngt as per CCU team   plan for heart transplant surg later tonight        Advanced care planning was discussed with patient and family.  Advanced care planning forms were reviewed and discussed as appropriate.  Differential diagnosis and plan of care discussed with patient after the evaluation.   Pain assessed and judicious use of narcotics when appropriate was discussed.  Importance of Fall prevention discussed.  Counseling on Smoking and Alcohol cessation was offered when appropriate.  Counseling on Diet, exercise, and medication compliance was done.       Approx 75 minutes spent.

## 2023-12-24 NOTE — PROGRESS NOTE ADULT - CRITICAL CARE ATTENDING COMMENT
59yo M with ischemic HFrEF, ADHF and cardiogenic shock awaiting heart transplant (status 2)    Neuro: no deficits, prn atarax for anxiety  Pulm: sating well on RA, prn albuterol and symbicort  CV: cardiogenic shock on IABP 1:1, cont afterload reduction with coreg, hydral, isordil  Renal: creat stable, no issues  GI: DASH diet  Heme: H/H stable, on heparin gtt for LV thrombus and IABP.   ID: no active issues  Endo: BG controlled  Lines: LFA IABP (12/21), RRA (12/14).    labs vacation today 57yo M with ischemic HFrEF, ADHF and cardiogenic shock awaiting heart transplant (status 2)    Neuro: no deficits, prn atarax for anxiety  Pulm: sating well on RA, prn albuterol and symbicort  CV: cardiogenic shock on IABP 1:1, cont afterload reduction with coreg, hydral, isordil  Renal: creat stable, no issues  GI: DASH diet  Heme: H/H stable, on heparin gtt for LV thrombus and IABP.   ID: no active issues  Endo: BG controlled  Lines: LFA IABP (12/21), RRA (12/14).    labs vacation today

## 2023-12-24 NOTE — PRE-ANESTHESIA EVALUATION ADULT - NSRADCARDRESULTSFT_GEN_ALL_CORE
< from: TTE Limited W or WO Ultrasound Enhancing Agent (11.11.23 @ 10:00) >    CONCLUSIONS:      1. Technically difficult image quality.   2. Left ventricular systolic function is severely decreased with a calculated ejection fraction of 22 % by the Chinchilla's biplane method of disks. There is global left ventricular hypokinesis. A filling defect is seen and is consistent with a left ventricular thrombus. Severe global left ventricular systolic dysfunction. The apex appears particularly hypokinetic.   3. There is a left ventricular thrombus.   4. The right ventricular cavity is normal in size and normal systolic function. Tricuspid annular plane systolic excursion (TAPSE) is 2.4 cm (normal >=1.7 cm). A device lead is visualized in the right heart.   5. Structurally normal mitral valve with normal leaflet excursion. There is calcification of the mitral valve annulus. There is mild mitral regurgitation.   6. Compared to the transthoracic echocardiogram performed on 11/2/2023 there have been no significant interval changes.    < end of copied text >    < from: Cardiac Catheterization (11.01.23 @ 14:34) >    Diagnostic Conclusions:     Chronic total occlusion of the left anterior descending artery and  right coronary artery  Left to right, Rentrop grade 2 collateral filling of the right  coronary artery  Left to left, Rentrop grade 3 collateral filling of the left  circumflex artery  Mild left main coronary artery disease   Right dominant system   Elevated right atrial pressure (mRA 23mmHg with a V wave of 24mmHg)   Moderate post-capillary pulmonary hypertension (sPAP 52mmHg, dPAP  33mmHg, mPAP 40mmHg)  PCWP = 30mmHg with a V wave of 33mmHg   AO = 85/66/73     < end of copied text >

## 2023-12-24 NOTE — PROGRESS NOTE ADULT - CRITICAL CARE ATTENDING COMMENT
Patient was seen and examined with the fellow.  I agree with the above except for the following:    Suitable donor is available.  Patient to be kept NPO for possible OR later today.  Continue current cardiac medications.  Last PRA on 12/12/23 was 0%.  Will proceed with standard induction and IS protocol.

## 2023-12-24 NOTE — PROGRESS NOTE ADULT - ASSESSMENT
57 yo M with HFrEF (LVIDd 6.4 cm, LVEF 32%), CAD s/p PCI (2008), HTN, DMT2 (A1c 8.3%) and CVA s/p TPA (2018), recently treated for PNA who initially presented to [facility] via EMS after syncope reportedly requiring defibrilation. Treated for ACS but left AMA to come to [facility]. On arrival ECG with ST depression in lateral leads. Intubated 11/1 for respiratory failure. Found to have COVID. L/RHC 11/1revealing  of LAD and RCA, elevated filling pressures and CI of 1.5 prompting placement of IABP. Extubated 11/3. IABP was weaned to off 11/7, then the following day on 11/8, developed PMVT cardiac arrest with prompt CPR and defibrillation, started on IV Lidocaine and Amio. IABP ultimately replaced on 11/9 for worsening hypotension. TTE 11/11 revealing LV thrombus. Mild transaminitis. Now off amio.    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 AT evaluation launched 11/10, he is ABO A. He was discussed during TSC on 11/16 and was approved for listing, however was found to be Bacteremic with 11/17 Blc Cx growing mixed cultures Staph epi and Enterococcus faecalis and started on IV Vanco. Repeat cultures from 11/18 reveal NGTD and therefore was cleared by ID for listing.     GM + rods (Cutibacterium) in blood culture on 11/30 (reported on 12/4), restarted on vancomycin, and status 7, repeat cultures negative x48 hours (12/6).    Currently listed Status 2, ABO A, PRA 0% (12/12).    He currently appears euvolemic He appears adequately supported on IABP at 1:1, electrically quiescent on oral Amiodarone. Cr is normal. IABP changed to LFA 12/21. Awaiting suitable donor. Tele with intermittent LBBB, asymptomatic.     Cardiac Studies  11/21/23 TTE: limited unable to rule out endocarditis, technically difficult study  11/1123 TTE: LVEF 22% (global), LV thrombus, normal RV size and function, mild MR, trace TR  11/1/23  LHC showed pLAD 70 % stenosis in the ostium portion of the segment and 100 % in-stent restenosis and in mRCA, 100 % stenosis.   11/1/23 RHC; RA 23 Vw 24, PA 52/33/40, PCWP 30 Vw 33, AO 85/66/73, PA sat 54.4%, CO/CI (F) 2.96/1.44, IABP was placed during the cath through R common femoral artery.   11/1/23 TTE: LVIDd 6.4cm , LVEF 32%, regional WMA, grade II DD,  TAPSE 1.7cm, mld MR, trace TR.    57 yo M with HFrEF (LVIDd 6.4 cm, LVEF 32%), CAD s/p PCI (2008), HTN, DMT2 (A1c 8.3%) and CVA s/p TPA (2018), recently treated for PNA who initially presented to [facility] via EMS after syncope reportedly requiring defibrilation. Treated for ACS but left AMA to come to [facility]. On arrival ECG with ST depression in lateral leads. Intubated 11/1 for respiratory failure. Found to have COVID. L/RHC 11/1revealing  of LAD and RCA, elevated filling pressures and CI of 1.5 prompting placement of IABP. Extubated 11/3. IABP was weaned to off 11/7, then the following day on 11/8, developed PMVT cardiac arrest with prompt CPR and defibrillation, started on IV Lidocaine and Amio. IABP ultimately replaced on 11/9 for worsening hypotension. TTE 11/11 revealing LV thrombus. Mild transaminitis. Now off amio.    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 AT evaluation launched 11/10, he is ABO A. He was discussed during TSC on 11/16 and was approved for listing, however was found to be Bacteremic with 11/17 Blc Cx growing mixed cultures Staph epi and Enterococcus faecalis and started on IV Vanco. Repeat cultures from 11/18 reveal NGTD and therefore was cleared by ID for listing.     GM + rods (Cutibacterium) in blood culture on 11/30 (reported on 12/4), restarted on vancomycin, and status 7, repeat cultures negative x48 hours (12/6).    Currently listed Status 2, ABO A, PRA 0% (12/12).    He currently appears euvolemic He appears adequately supported on IABP at 1:1, electrically quiescent on oral Amiodarone. Cr is normal. IABP changed to LFA 12/21. Awaiting suitable donor. Tele with intermittent LBBB, asymptomatic.     Cardiac Studies  11/21/23 TTE: limited unable to rule out endocarditis, technically difficult study  11/1123 TTE: LVEF 22% (global), LV thrombus, normal RV size and function, mild MR, trace TR  11/1/23  LHC showed pLAD 70 % stenosis in the ostium portion of the segment and 100 % in-stent restenosis and in mRCA, 100 % stenosis.   11/1/23 RHC; RA 23 Vw 24, PA 52/33/40, PCWP 30 Vw 33, AO 85/66/73, PA sat 54.4%, CO/CI (F) 2.96/1.44, IABP was placed during the cath through R common femoral artery.   11/1/23 TTE: LVIDd 6.4cm , LVEF 32%, regional WMA, grade II DD,  TAPSE 1.7cm, mld MR, trace TR.

## 2023-12-24 NOTE — PRE-ANESTHESIA EVALUATION ADULT - NSANTHPEFT_GEN_ALL_CORE
GENERAL: NAD  HEAD:  Atraumatic, Normocephalic  CHEST/LUNG: Clear to auscultation bilaterally  HEART: Normal S1/S2  PSYCH: AAOx3  NEUROLOGY: non-focal  SKIN: No obvious rashes or lesions  IABP in place, right radial rangel

## 2023-12-24 NOTE — PROGRESS NOTE ADULT - SUBJECTIVE AND OBJECTIVE BOX
CICU DAY NOTE  Admission date: 11/1/23  Chief complaint/ Diagnosis: cardiogenic shock   HPI: 57yo M w/ hx HTN, CAD w/ 1 stent in 2009, ICH (2008) presenting with abn ekg. Patient presented to [facility] where he was found to have STEMI, recommended to get cath however patient did not want to get it there so it left and came here.  Patient initially had cough, congestion, fever, was placed on antibiotics on Sunday.  Started feeling nauseous and had a presyncopal event after which he presented to ED last night.  Had chest pain as well.  Chest pain is midsternal.  Not currently having chest pain.  Received 4 aspirin 30 min pta.     Interval history: Uneventful overnight    REVIEW OF SYSTEMS  Denies CP, Palpitation, SOB, Dyspnea{ x ] All other systems negative    MEDICATIONS  (STANDING)  aspirin  chewable 81 milliGRAM(s) Oral daily  atorvastatin 80 milliGRAM(s) Oral at bedtime  bisacodyl 5 milliGRAM(s) Oral every 12 hours  budesonide  80 MICROgram(s)/formoterol 4.5 MICROgram(s) Inhaler 2 Puff(s) Inhalation two times a day  carvedilol 25 milliGRAM(s) Oral every 12 hours  chlorhexidine 2% Cloths 1 Application(s) Topical daily  heparin  Infusion 1300 Unit(s)/Hr (14 mL/Hr) IV Continuous <Continuous>  hepatitis B Vaccine - Adult (HEPLISAV-B) 20 MICROGram(s) IntraMuscular once  hydrALAZINE 50 milliGRAM(s) Oral every 8 hours  insulin glargine Injectable (LANTUS) 12 Unit(s) SubCutaneous at bedtime  insulin lispro (ADMELOG) corrective regimen sliding scale   SubCutaneous three times a day before meals  insulin lispro (ADMELOG) corrective regimen sliding scale   SubCutaneous at bedtime  insulin lispro Injectable (ADMELOG) 9 Unit(s) SubCutaneous three times a day before meals  isosorbide   dinitrate Tablet (ISORDIL) 30 milliGRAM(s) Oral three times a day  polyethylene glycol 3350 17 Gram(s) Oral two times a day  senna 2 Tablet(s) Oral at bedtime    MEDICATIONS  (PRN):  hydrOXYzine hydrochloride 25 milliGRAM(s) Oral two times a day PRN Anxiety    FAMILY HISTORY:  Family history of meningitis (Sibling) Brother, death at age 49 from complications of infection (June 2015)  Family history of hypertension in mother    Allergy   penicillins (Unknown)    ICU Vital Signs Last 24 Hrs  T(C): 36.7 (Max: 36.9)  HR: 83(73 - 96)  RR: 18(13 - 25)  SpO2: 97% (94% - 97%) on room air     I&O's Summary  IN: 1256 mL / OUT: 1350 mL / NET: -94 mL    PHYSICAL EXAM  Appearance: Normal, NAD  HEAD:  Normocephalic  EYES: PERRLA, conjunctiva and sclera clear  NECK: Supple, No JVD  CHEST/LUNG: Clear to auscultation bilaterally; No wheezing  HEART: Normal S1, S2. No murmurs, rubs, or gallops  ABDOMEN: + Bowel sounds, Soft, NT, ND   EXTREMITIES:  2+ Peripheral Pulses, No clubbing, cyanosis, or edema  NEUROLOGY: non-focal, aaox3  SKIN: No rashes or lesions    Interpretation of Telemetry:                        12.6   8.48  )-----------( 185               37.7     133<L>  |  105  |  30<H>  ----------------------------<  131<H>  4.1   |  19<L>  |  1.18    Ca    10.0     Phos  3.5     Mg     1.8   (repleted)   TPro  6.9  /  Alb  4.0  /  TBili  0.3  /  DBili  x   /  AST  25  /  ALT  65<H>  /  AlkPhos  63                 CAPILLARY BLOOD GLUCOSE      POCT Blood Glucose.: 143 mg/dL (23 Dec 2023 20:44)  POCT Blood Glucose.: 129 mg/dL (23 Dec 2023 16:44)  POCT Blood Glucose.: 170 mg/dL (23 Dec 2023 11:31)  POCT Blood Glucose.: 114 mg/dL (23 Dec 2023 08:54)     CICU DAY NOTE  Admission date: 11/1/23  Chief complaint/ Diagnosis: cardiogenic shock   HPI: 57yo M w/ hx HTN, CAD w/ 1 stent in 2009, ICH (2008) presenting with abn ekg. Patient presented to [facility] where he was found to have STEMI, recommended to get cath however patient did not want to get it there so it left and came here.  Patient initially had cough, congestion, fever, was placed on antibiotics on Sunday.  Started feeling nauseous and had a presyncopal event after which he presented to ED last night.  Had chest pain as well.  Chest pain is midsternal.  Not currently having chest pain.  Received 4 aspirin 30 min pta.     Interval history: Uneventful overnight    REVIEW OF SYSTEMS  Denies CP, Palpitation, SOB, Dyspnea{ x ] All other systems negative    MEDICATIONS  (STANDING)  aspirin  chewable 81 milliGRAM(s) Oral daily  atorvastatin 80 milliGRAM(s) Oral at bedtime  bisacodyl 5 milliGRAM(s) Oral every 12 hours  budesonide  80 MICROgram(s)/formoterol 4.5 MICROgram(s) Inhaler 2 Puff(s) Inhalation two times a day  carvedilol 25 milliGRAM(s) Oral every 12 hours  chlorhexidine 2% Cloths 1 Application(s) Topical daily  heparin  Infusion 1300 Unit(s)/Hr (14 mL/Hr) IV Continuous <Continuous>  hepatitis B Vaccine - Adult (HEPLISAV-B) 20 MICROGram(s) IntraMuscular once  hydrALAZINE 50 milliGRAM(s) Oral every 8 hours  insulin glargine Injectable (LANTUS) 12 Unit(s) SubCutaneous at bedtime  insulin lispro (ADMELOG) corrective regimen sliding scale   SubCutaneous three times a day before meals  insulin lispro (ADMELOG) corrective regimen sliding scale   SubCutaneous at bedtime  insulin lispro Injectable (ADMELOG) 9 Unit(s) SubCutaneous three times a day before meals  isosorbide   dinitrate Tablet (ISORDIL) 30 milliGRAM(s) Oral three times a day  polyethylene glycol 3350 17 Gram(s) Oral two times a day  senna 2 Tablet(s) Oral at bedtime    MEDICATIONS  (PRN):  hydrOXYzine hydrochloride 25 milliGRAM(s) Oral two times a day PRN Anxiety    FAMILY HISTORY:  Family history of meningitis (Sibling) Brother, death at age 49 from complications of infection (June 2015)  Family history of hypertension in mother    Allergy   penicillins (Unknown)    ICU Vital Signs Last 24 Hrs  T(C): 36.7 (Max: 36.9)  HR: 83(73 - 96)  RR: 18(13 - 25)  SpO2: 97% (94% - 97%) on room air     I&O's Summary  IN: 1256 mL / OUT: 1350 mL / NET: -94 mL    PHYSICAL EXAM  Appearance: Normal, NAD  HEAD:  Normocephalic  EYES: PERRLA, conjunctiva and sclera clear  NECK: Supple, No JVD  CHEST/LUNG: Clear to auscultation bilaterally; No wheezing  HEART: Normal S1, S2. No murmurs, rubs, or gallops  ABDOMEN: + Bowel sounds, Soft, NT, ND   EXTREMITIES:  2+ Peripheral Pulses, No clubbing, cyanosis, or edema  NEUROLOGY: non-focal, aaox3  SKIN: No rashes or lesions    Interpretation of Telemetry:                        12.6   8.48  )-----------( 185               37.7     133<L>  |  105  |  30<H>  ----------------------------<  131<H>  4.1   |  19<L>  |  1.18    Ca    10.0     Phos  3.5     Mg     1.8   (repleted)   TPro  6.9  /  Alb  4.0  /  TBili  0.3  /  DBili  x   /  AST  25  /  ALT  65<H>  /  AlkPhos  63                 CAPILLARY BLOOD GLUCOSE      POCT Blood Glucose.: 143 mg/dL (23 Dec 2023 20:44)  POCT Blood Glucose.: 129 mg/dL (23 Dec 2023 16:44)  POCT Blood Glucose.: 170 mg/dL (23 Dec 2023 11:31)  POCT Blood Glucose.: 114 mg/dL (23 Dec 2023 08:54)     CICU DAY NOTE  Admission date: 11/1/23  Chief complaint/ Diagnosis: cardiogenic shock   HPI: 59yo M w/ hx HTN, CAD w/ 1 stent in 2009, ICH (2008) presenting with abn ekg. Patient presented to [facility] where he was found to have STEMI, recommended to get cath however patient did not want to get it there so it left and came here.  Patient initially had cough, congestion, fever, was placed on antibiotics on Sunday.  Started feeling nauseous and had a presyncopal event after which he presented to ED last night.  Had chest pain as well.  Chest pain is midsternal.  Not currently having chest pain.  Received 4 aspirin 30 min pta.     Interval history: Uneventful overnight  Cr stable     REVIEW OF SYSTEMS  Denies CP, Palpitation, SOB, Dyspnea{ x ] All other systems negative    MEDICATIONS  (STANDING)  aspirin  chewable 81 milliGRAM(s) Oral daily  atorvastatin 80 milliGRAM(s) Oral at bedtime  bisacodyl 5 milliGRAM(s) Oral every 12 hours  budesonide  80 MICROgram(s)/formoterol 4.5 MICROgram(s) Inhaler 2 Puff(s) Inhalation two times a day  carvedilol 25 milliGRAM(s) Oral every 12 hours  chlorhexidine 2% Cloths 1 Application(s) Topical daily  heparin  Infusion 1300 Unit(s)/Hr (14 mL/Hr) IV Continuous <Continuous>  hepatitis B Vaccine - Adult (HEPLISAV-B) 20 MICROGram(s) IntraMuscular once  hydrALAZINE 50 milliGRAM(s) Oral every 8 hours  insulin glargine Injectable (LANTUS) 12 Unit(s) SubCutaneous at bedtime  insulin lispro (ADMELOG) corrective regimen sliding scale   SubCutaneous three times a day before meals  insulin lispro (ADMELOG) corrective regimen sliding scale   SubCutaneous at bedtime  insulin lispro Injectable (ADMELOG) 9 Unit(s) SubCutaneous three times a day before meals  isosorbide   dinitrate Tablet (ISORDIL) 30 milliGRAM(s) Oral three times a day  polyethylene glycol 3350 17 Gram(s) Oral two times a day  senna 2 Tablet(s) Oral at bedtime    MEDICATIONS  (PRN):  hydrOXYzine hydrochloride 25 milliGRAM(s) Oral two times a day PRN Anxiety    FAMILY HISTORY:  Family history of meningitis (Sibling) Brother, death at age 49 from complications of infection (June 2015)  Family history of hypertension in mother    Allergy   penicillins (Unknown)    ICU Vital Signs Last 24 Hrs  T(C): 36.7 (Max: 36.9)  Aug assistant mean 70-80'S  HR: 83(73 - 96)  RR: 18(13 - 25)  SpO2: 97% (94% - 97%) on room air     I&O's Summary  IN: 1256 mL / OUT: 1350 mL / NET: -94 mL    PHYSICAL EXAM  Appearance: Normal, NAD  HEAD:  Normocephalic  EYES: PERRLA, conjunctiva and sclera clear  NECK: Supple, No JVD  CHEST/LUNG: Clear to auscultation bilaterally; No wheezing  HEART: Normal S1, S2. No murmurs, rubs, or gallops  ABDOMEN: + Bowel sounds, Soft, NT, ND   EXTREMITIES:  2+ Peripheral Pulses, No clubbing, cyanosis, or edema  NEUROLOGY: non-focal, aaox3  SKIN: No rashes or lesions    Interpretation of Telemetry:   CXR IABP in place                         12.6   8.48  )-----------( 185               37.7     133<L>  |  105  |  30<H>  ----------------------------<  131<H>  4.1   |  19<L>  |  1.18    Ca    10.0     Phos  3.5     Mg     1.8   (repleted)   TPro  6.9  /  Alb  4.0  /  TBili  0.3  /  DBili  x   /  AST  25  /  ALT  65<H>  /  AlkPhos  63   F/S 129-170       CICU DAY NOTE  Admission date: 11/1/23  Chief complaint/ Diagnosis: cardiogenic shock   HPI: 57yo M w/ hx HTN, CAD w/ 1 stent in 2009, ICH (2008) presenting with abn ekg. Patient presented to [facility] where he was found to have STEMI, recommended to get cath however patient did not want to get it there so it left and came here.  Patient initially had cough, congestion, fever, was placed on antibiotics on Sunday.  Started feeling nauseous and had a presyncopal event after which he presented to ED last night.  Had chest pain as well.  Chest pain is midsternal.  Not currently having chest pain.  Received 4 aspirin 30 min pta.     Interval history: Uneventful overnight  Cr stable     REVIEW OF SYSTEMS  Denies CP, Palpitation, SOB, Dyspnea{ x ] All other systems negative    MEDICATIONS  (STANDING)  aspirin  chewable 81 milliGRAM(s) Oral daily  atorvastatin 80 milliGRAM(s) Oral at bedtime  bisacodyl 5 milliGRAM(s) Oral every 12 hours  budesonide  80 MICROgram(s)/formoterol 4.5 MICROgram(s) Inhaler 2 Puff(s) Inhalation two times a day  carvedilol 25 milliGRAM(s) Oral every 12 hours  chlorhexidine 2% Cloths 1 Application(s) Topical daily  heparin  Infusion 1300 Unit(s)/Hr (14 mL/Hr) IV Continuous <Continuous>  hepatitis B Vaccine - Adult (HEPLISAV-B) 20 MICROGram(s) IntraMuscular once  hydrALAZINE 50 milliGRAM(s) Oral every 8 hours  insulin glargine Injectable (LANTUS) 12 Unit(s) SubCutaneous at bedtime  insulin lispro (ADMELOG) corrective regimen sliding scale   SubCutaneous three times a day before meals  insulin lispro (ADMELOG) corrective regimen sliding scale   SubCutaneous at bedtime  insulin lispro Injectable (ADMELOG) 9 Unit(s) SubCutaneous three times a day before meals  isosorbide   dinitrate Tablet (ISORDIL) 30 milliGRAM(s) Oral three times a day  polyethylene glycol 3350 17 Gram(s) Oral two times a day  senna 2 Tablet(s) Oral at bedtime    MEDICATIONS  (PRN):  hydrOXYzine hydrochloride 25 milliGRAM(s) Oral two times a day PRN Anxiety    FAMILY HISTORY:  Family history of meningitis (Sibling) Brother, death at age 49 from complications of infection (June 2015)  Family history of hypertension in mother    Allergy   penicillins (Unknown)    ICU Vital Signs Last 24 Hrs  T(C): 36.7 (Max: 36.9)  Aug assistant mean 70-80'S  HR: 83(73 - 96)  RR: 18(13 - 25)  SpO2: 97% (94% - 97%) on room air     I&O's Summary  IN: 1256 mL / OUT: 1350 mL / NET: -94 mL    PHYSICAL EXAM  Appearance: Normal, NAD  HEAD:  Normocephalic  EYES: PERRLA, conjunctiva and sclera clear  NECK: Supple, No JVD  CHEST/LUNG: Clear to auscultation bilaterally; No wheezing  HEART: Normal S1, S2. No murmurs, rubs, or gallops  ABDOMEN: + Bowel sounds, Soft, NT, ND   EXTREMITIES:  2+ Peripheral Pulses, No clubbing, cyanosis, or edema  NEUROLOGY: non-focal, aaox3  SKIN: No rashes or lesions    Interpretation of Telemetry:   CXR IABP in place                         12.6   8.48  )-----------( 185               37.7     133<L>  |  105  |  30<H>  ----------------------------<  131<H>  4.1   |  19<L>  |  1.18    Ca    10.0     Phos  3.5     Mg     1.8   (repleted)   TPro  6.9  /  Alb  4.0  /  TBili  0.3  /  DBili  x   /  AST  25  /  ALT  65<H>  /  AlkPhos  63   F/S 129-170       CICU DAY NOTE  Admission date: 11/1/23  Chief complaint/ Diagnosis: cardiogenic shock   HPI: 57yo M w/ hx HTN, CAD w/ 1 stent in 2009, ICH (2008) presenting with abn ekg. Patient presented to [facility] where he was found to have STEMI, recommended to get cath however patient did not want to get it there so it left and came here.  Patient initially had cough, congestion, fever, was placed on antibiotics on Sunday.  Started feeling nauseous and had a presyncopal event after which he presented to ED last night.  Had chest pain as well.  Chest pain is midsternal.  Not currently having chest pain.  Received 4 aspirin 30 min pta.     Interval history: Uneventful overnight  Cr stable     REVIEW OF SYSTEMS  Denies CP, Palpitation, SOB, Dyspnea{ x ] All other systems negative    MEDICATIONS  (STANDING)  aspirin  chewable 81 milliGRAM(s) Oral daily  atorvastatin 80 milliGRAM(s) Oral at bedtime  bisacodyl 5 milliGRAM(s) Oral every 12 hours  budesonide  80 MICROgram(s)/formoterol 4.5 MICROgram(s) Inhaler 2 Puff(s) Inhalation two times a day  carvedilol 25 milliGRAM(s) Oral every 12 hours  chlorhexidine 2% Cloths 1 Application(s) Topical daily  heparin  Infusion 1300 Unit(s)/Hr (14 mL/Hr) IV Continuous <Continuous>  hepatitis B Vaccine - Adult (HEPLISAV-B) 20 MICROGram(s) IntraMuscular once  hydrALAZINE 50 milliGRAM(s) Oral every 8 hours  insulin glargine Injectable (LANTUS) 12 Unit(s) SubCutaneous at bedtime  insulin lispro (ADMELOG) corrective regimen sliding scale   SubCutaneous three times a day before meals  insulin lispro (ADMELOG) corrective regimen sliding scale   SubCutaneous at bedtime  insulin lispro Injectable (ADMELOG) 9 Unit(s) SubCutaneous three times a day before meals  isosorbide   dinitrate Tablet (ISORDIL) 30 milliGRAM(s) Oral three times a day  polyethylene glycol 3350 17 Gram(s) Oral two times a day  senna 2 Tablet(s) Oral at bedtime    MEDICATIONS  (PRN):  hydrOXYzine hydrochloride 25 milliGRAM(s) Oral two times a day PRN Anxiety    FAMILY HISTORY:  Family history of meningitis (Sibling) Brother, death at age 49 from complications of infection (June 2015)  Family history of hypertension in mother    Allergy   penicillins (Unknown)    ICU Vital Signs Last 24 Hrs  T(C): 36.7 (Max: 36.9)  Aug assistant mean 70-80'S  HR: 83(73 - 96)  RR: 18(13 - 25)  SpO2: 97% (94% - 97%) on room air     I&O's Summary  IN: 1256 mL / OUT: 1350 mL / NET: -94 mL    PHYSICAL EXAM  Appearance: Normal, NAD  HEAD:  Normocephalic  EYES: PERRLA, conjunctiva and sclera clear  NECK: Supple  CHEST/LUNG: Clear to auscultation bilaterally; No wheezing  HEART: Normal S1, S2  ABDOMEN: + Bowel sounds, Soft, NT, ND   EXTREMITIES:  2+ Peripheral Pulses, No clubbing, cyanosis, or edema  NEUROLOGY: non-focal, aaox3  SKIN: No rashes or lesions    Interpretation of Telemetry:   CXR IABP in place                         12.6   8.48  )-----------( 185               37.7     133<L>  |  105  |  30<H>  ----------------------------<  131<H>  4.1   |  19<L>  |  1.18    Ca    10.0     Phos  3.5     Mg     1.8   (repleted)   TPro  6.9  /  Alb  4.0  /  TBili  0.3  /  DBili  x   /  AST  25  /  ALT  65<H>  /  AlkPhos  63   F/S 129-170       CICU DAY NOTE  Admission date: 11/1/23  Chief complaint/ Diagnosis: cardiogenic shock   HPI: 59yo M w/ hx HTN, CAD w/ 1 stent in 2009, ICH (2008) presenting with abn ekg. Patient presented to [facility] where he was found to have STEMI, recommended to get cath however patient did not want to get it there so it left and came here.  Patient initially had cough, congestion, fever, was placed on antibiotics on Sunday.  Started feeling nauseous and had a presyncopal event after which he presented to ED last night.  Had chest pain as well.  Chest pain is midsternal.  Not currently having chest pain.  Received 4 aspirin 30 min pta.     Interval history: Uneventful overnight  Cr stable     REVIEW OF SYSTEMS  Denies CP, Palpitation, SOB, Dyspnea{ x ] All other systems negative    MEDICATIONS  (STANDING)  aspirin  chewable 81 milliGRAM(s) Oral daily  atorvastatin 80 milliGRAM(s) Oral at bedtime  bisacodyl 5 milliGRAM(s) Oral every 12 hours  budesonide  80 MICROgram(s)/formoterol 4.5 MICROgram(s) Inhaler 2 Puff(s) Inhalation two times a day  carvedilol 25 milliGRAM(s) Oral every 12 hours  chlorhexidine 2% Cloths 1 Application(s) Topical daily  heparin  Infusion 1300 Unit(s)/Hr (14 mL/Hr) IV Continuous <Continuous>  hepatitis B Vaccine - Adult (HEPLISAV-B) 20 MICROGram(s) IntraMuscular once  hydrALAZINE 50 milliGRAM(s) Oral every 8 hours  insulin glargine Injectable (LANTUS) 12 Unit(s) SubCutaneous at bedtime  insulin lispro (ADMELOG) corrective regimen sliding scale   SubCutaneous three times a day before meals  insulin lispro (ADMELOG) corrective regimen sliding scale   SubCutaneous at bedtime  insulin lispro Injectable (ADMELOG) 9 Unit(s) SubCutaneous three times a day before meals  isosorbide   dinitrate Tablet (ISORDIL) 30 milliGRAM(s) Oral three times a day  polyethylene glycol 3350 17 Gram(s) Oral two times a day  senna 2 Tablet(s) Oral at bedtime    MEDICATIONS  (PRN):  hydrOXYzine hydrochloride 25 milliGRAM(s) Oral two times a day PRN Anxiety    FAMILY HISTORY:  Family history of meningitis (Sibling) Brother, death at age 49 from complications of infection (June 2015)  Family history of hypertension in mother    Allergy   penicillins (Unknown)    ICU Vital Signs Last 24 Hrs  T(C): 36.7 (Max: 36.9)  Aug assistant mean 70-80'S  HR: 83(73 - 96)  RR: 18(13 - 25)  SpO2: 97% (94% - 97%) on room air     I&O's Summary  IN: 1256 mL / OUT: 1350 mL / NET: -94 mL    PHYSICAL EXAM  Appearance: Normal, NAD  HEAD:  Normocephalic  EYES: PERRLA, conjunctiva and sclera clear  NECK: Supple  CHEST/LUNG: Clear to auscultation bilaterally; No wheezing  HEART: Normal S1, S2  ABDOMEN: + Bowel sounds, Soft, NT, ND   EXTREMITIES:  2+ Peripheral Pulses, No clubbing, cyanosis, or edema  NEUROLOGY: non-focal, aaox3  SKIN: No rashes or lesions    Interpretation of Telemetry:   CXR IABP in place                         12.6   8.48  )-----------( 185               37.7     133<L>  |  105  |  30<H>  ----------------------------<  131<H>  4.1   |  19<L>  |  1.18    Ca    10.0     Phos  3.5     Mg     1.8   (repleted)   TPro  6.9  /  Alb  4.0  /  TBili  0.3  /  DBili  x   /  AST  25  /  ALT  65<H>  /  AlkPhos  63   F/S 129-170

## 2023-12-24 NOTE — PRE PROCEDURE NOTE - PRE PROCEDURE EVALUATION
Cardiac Surgery Pre-op Note:    CC: Patient is a 58y old  Male who presents with a chief complaint of cardiogenic shock (24 Dec 2023 07:24)      Referring Physician:                                                                                                           Surgeon: Dr Bhatti    Procedure: (Date) (Procedure) Heart transplant    Allergies    penicillins (Unknown)    Intolerances        HPI:  pt seen and approx 1:30 pm  in [facility]    57yo M w/ hx HTN, CAD w/ 1 stent in 2009, ICH (2008) presenting with abn ekg. Patient presented to [facility] where he was found to have STEMI, recommended to get cath however patient did not want to get it there so it left and came here.  Patient initially had cough, congestion, fever, was placed on antibiotics on Sunday.  Started feeling nauseous and had a presyncopal event after which he presented to ED last night.  Had chest pain as well.  Chest pain is midsternal.  Not currently having chest pain.  Received 4 aspirin 30 min pta. (01 Nov 2023 15:11)      PAST MEDICAL & SURGICAL HISTORY:  HTN (hypertension)      CAD (coronary artery disease)  2009; stent      Intracranial hemorrhage  2008      Respiratory arrest  december 1st      Myocardial infarction, unspecified MI type, unspecified artery      History of coronary artery stent placement          MEDICATIONS  (STANDING):  aspirin  chewable 81 milliGRAM(s) Oral daily  atorvastatin 80 milliGRAM(s) Oral at bedtime  bisacodyl 5 milliGRAM(s) Oral every 12 hours  budesonide  80 MICROgram(s)/formoterol 4.5 MICROgram(s) Inhaler 2 Puff(s) Inhalation two times a day  carvedilol 25 milliGRAM(s) Oral every 12 hours  cefepime   IVPB 1000 milliGRAM(s) IV Intermittent once  chlorhexidine 0.12% Liquid 5 milliLiter(s) Swish and Spit two times a day  chlorhexidine 2% Cloths 1 Application(s) Topical daily  chlorhexidine 4% Liquid 1 Application(s) Topical once  DAPTOmycin IVPB 500 milliGRAM(s) IV Intermittent once  heparin  Infusion 1300 Unit(s)/Hr (14 mL/Hr) IV Continuous <Continuous>  hepatitis B Vaccine - Adult (HEPLISAV-B) 20 MICROGram(s) IntraMuscular once  hydrALAZINE 50 milliGRAM(s) Oral every 8 hours  insulin glargine Injectable (LANTUS) 12 Unit(s) SubCutaneous at bedtime  insulin lispro (ADMELOG) corrective regimen sliding scale   SubCutaneous at bedtime  insulin lispro (ADMELOG) corrective regimen sliding scale   SubCutaneous three times a day before meals  insulin lispro Injectable (ADMELOG) 9 Unit(s) SubCutaneous three times a day before meals  isosorbide   dinitrate Tablet (ISORDIL) 30 milliGRAM(s) Oral three times a day  methylPREDNISolone sodium succinate IVPB 500 milliGRAM(s) IV Intermittent once  methylPREDNISolone sodium succinate IVPB 500 milliGRAM(s) IV Intermittent once  mycophenolate mofetil IVPB 1000 milliGRAM(s) IV Intermittent once  polyethylene glycol 3350 17 Gram(s) Oral two times a day  senna 2 Tablet(s) Oral at bedtime    MEDICATIONS  (PRN):  hydrOXYzine hydrochloride 25 milliGRAM(s) Oral two times a day PRN Anxiety        Labs:                        12.6   8.48  )-----------( 185      ( 23 Dec 2023 01:23 )             37.7     12-23    133<L>  |  105  |  30<H>  ----------------------------<  131<H>  4.1   |  19<L>  |  1.18    Ca    10.0      23 Dec 2023 01:23  Phos  3.5     12-23  Mg     1.8     12-23    TPro  6.9  /  Alb  4.0  /  TBili  0.3  /  DBili  x   /  AST  25  /  ALT  65<H>  /  AlkPhos  63  12-23    PT/INR - ( 23 Dec 2023 01:23 )   PT: 12.0 sec;   INR: 1.15 ratio         PTT - ( 23 Dec 2023 01:23 )  PTT:70.7 sec    Blood Type: ABO Interpretation: A (12-23 @ 01:36)    HGB A1C:   Prealbumin:   Pro-BNP:   Thyroid Panel:   MRSA:  / MSSA:   Urinalysis Basic - ( 23 Dec 2023 01:23 )    Color: x / Appearance: x / SG: x / pH: x  Gluc: 131 mg/dL / Ketone: x  / Bili: x / Urobili: x   Blood: x / Protein: x / Nitrite: x   Leuk Esterase: x / RBC: x / WBC x   Sq Epi: x / Non Sq Epi: x / Bacteria: x        CXR: < from: Xray Chest 1 View- PORTABLE-Routine (Xray Chest 1 View- PORTABLE-Routine .) (12.23.23 @ 09:40) >  FINDINGS:  IABP marker is 1.8cm below the aortic knob.  The cardiomediastinal silhouette is within normal limits.  Lungs are clear. No pleural effusion or pneumothorax.    IMPRESSION:    IABP marker as above.    < end of copied text >      EKG:< from: 12 Lead ECG (12.21.23 @ 05:31) >  Ventricular Rate 73 BPM    Atrial Rate 73 BPM    P-R Interval 202 ms    QRS Duration 112 ms    Q-T Interval 384 ms    QTC Calculation(Bazett) 423 ms    P Axis 31 degrees    R Axis -6 degrees    T Axis 204 degrees    Diagnosis Line NORMAL SINUS RHYTHM  LEFT VENTRICULAR HYPERTROPHY WITH REPOLARIZATION ABNORMALITY AND QRS WIDENING  ABNORMAL ECG  WHEN COMPARED WITH ECG OF 21-DEC-2023 04:12, (UNCONFIRMED)  NO SIGNIFICANT CHANGE WAS FOUND    < end of copied text >      Carotid Duplex:    < from: VA Duplex Carotid, Bilat (11.11.23 @ 13:44) >  RIGHT:  PROX CCA = 97 cm/s  DIST CCA = 68 cm/s  PROX ICA = 70 cm/s  DIST ICA = 67 cm/s  ECA = 80 cm/s    LEFT:  PROX CCA = 110 cm/s  DIST CCA = 93 cm/s  PROX ICA = 68 cm/s  DIST ICA = 56 cm/s  ECA = 75 cm/s    Antegrade flow is noted within both vertebral arteries.    IMPRESSION: No significant hemodynamic stenosis of either carotid artery.    < end of copied text >    PFT's:    Echocardiogram:< from: TTE Limited W or WO Ultrasound Enhancing Agent (11.21.23 @ 07:35) >  CONCLUSIONS:      1. Unable to rule out endocarditis.   2. Compared to the transthoracic echocardiogram performed on 11/11/2023 Current study is limited to assess for endocarditis.   3. Technically difficult image quality.    ________________________________________________________________________________________  FINDINGS:     Aortic Valve:  The aortic valve was not well visualized.     Mitral Valve:  Structurally normal mitral valvewith normal leaflet excursion. There is mild mitral regurgitation.     Tricuspid Valve:  Structurally normal tricuspid valve with normal leaflet excursion.     Pulmonic Valve:  The pulmonic valve was not well visualized.    < end of copied text >      Cardiac catheterization:    Vein Mapping:    Gen: WN/WD NAD  Neuro: AAOx3, nonfocal  Pulm: CTA B/L  CV: RRR, S1S2  Abd: Soft, NT, ND +BS  Ext: No edema, + peripheral pulses  Skin: R fem prior IABP site with old blood at site. Dressing at L IABP site intact, site without drainage or hematoma    Pt has AICD/PPM [ ] Yes  [x ] No             Brand Name:  Pre-op Beta Blocker ordered within 24 hrs of surgery ([facility] ONLY)?  [ ] Yes  [ ] No  If not, Why?  Type & Cross  [ x] Yes  [ ] No  NPO after Midnight [x ] Yes  [ ] No  Pre-op ABX ordered, to be taped on chart:  [ ] Yes  [ ] No     Hibiclens/Peridex ordered [x ] Yes  [ ] No  Intraop on Hold: PRBCs, CXR, CHANDLER [ ]   Consent obtained  [x ] Yes  [ ] No   Cardiac Surgery Pre-op Note:    CC: Patient is a 58y old  Male who presents with a chief complaint of cardiogenic shock (24 Dec 2023 07:24)      Referring Physician:                                                                                                           Surgeon: Dr Bhatti    Procedure: (Date) (Procedure) Heart transplant    Allergies    penicillins (Unknown)    Intolerances        HPI:  pt seen and approx 1:30 pm  in [facility]    57yo M w/ hx HTN, CAD w/ 1 stent in 2009, ICH (2008) presenting with abn ekg. Patient presented to [facility] where he was found to have STEMI, recommended to get cath however patient did not want to get it there so it left and came here.  Patient initially had cough, congestion, fever, was placed on antibiotics on Sunday.  Started feeling nauseous and had a presyncopal event after which he presented to ED last night.  Had chest pain as well.  Chest pain is midsternal.  Not currently having chest pain.  Received 4 aspirin 30 min pta. (01 Nov 2023 15:11)      PAST MEDICAL & SURGICAL HISTORY:  HTN (hypertension)      CAD (coronary artery disease)  2009; stent      Intracranial hemorrhage  2008      Respiratory arrest  december 1st      Myocardial infarction, unspecified MI type, unspecified artery      History of coronary artery stent placement          MEDICATIONS  (STANDING):  aspirin  chewable 81 milliGRAM(s) Oral daily  atorvastatin 80 milliGRAM(s) Oral at bedtime  bisacodyl 5 milliGRAM(s) Oral every 12 hours  budesonide  80 MICROgram(s)/formoterol 4.5 MICROgram(s) Inhaler 2 Puff(s) Inhalation two times a day  carvedilol 25 milliGRAM(s) Oral every 12 hours  cefepime   IVPB 1000 milliGRAM(s) IV Intermittent once  chlorhexidine 0.12% Liquid 5 milliLiter(s) Swish and Spit two times a day  chlorhexidine 2% Cloths 1 Application(s) Topical daily  chlorhexidine 4% Liquid 1 Application(s) Topical once  DAPTOmycin IVPB 500 milliGRAM(s) IV Intermittent once  heparin  Infusion 1300 Unit(s)/Hr (14 mL/Hr) IV Continuous <Continuous>  hepatitis B Vaccine - Adult (HEPLISAV-B) 20 MICROGram(s) IntraMuscular once  hydrALAZINE 50 milliGRAM(s) Oral every 8 hours  insulin glargine Injectable (LANTUS) 12 Unit(s) SubCutaneous at bedtime  insulin lispro (ADMELOG) corrective regimen sliding scale   SubCutaneous at bedtime  insulin lispro (ADMELOG) corrective regimen sliding scale   SubCutaneous three times a day before meals  insulin lispro Injectable (ADMELOG) 9 Unit(s) SubCutaneous three times a day before meals  isosorbide   dinitrate Tablet (ISORDIL) 30 milliGRAM(s) Oral three times a day  methylPREDNISolone sodium succinate IVPB 500 milliGRAM(s) IV Intermittent once  methylPREDNISolone sodium succinate IVPB 500 milliGRAM(s) IV Intermittent once  mycophenolate mofetil IVPB 1000 milliGRAM(s) IV Intermittent once  polyethylene glycol 3350 17 Gram(s) Oral two times a day  senna 2 Tablet(s) Oral at bedtime    MEDICATIONS  (PRN):  hydrOXYzine hydrochloride 25 milliGRAM(s) Oral two times a day PRN Anxiety        Labs:                        12.6   8.48  )-----------( 185      ( 23 Dec 2023 01:23 )             37.7     12-23    133<L>  |  105  |  30<H>  ----------------------------<  131<H>  4.1   |  19<L>  |  1.18    Ca    10.0      23 Dec 2023 01:23  Phos  3.5     12-23  Mg     1.8     12-23    TPro  6.9  /  Alb  4.0  /  TBili  0.3  /  DBili  x   /  AST  25  /  ALT  65<H>  /  AlkPhos  63  12-23    PT/INR - ( 23 Dec 2023 01:23 )   PT: 12.0 sec;   INR: 1.15 ratio         PTT - ( 23 Dec 2023 01:23 )  PTT:70.7 sec    Blood Type: ABO Interpretation: A (12-23 @ 01:36)    HGB A1C:   Prealbumin:   Pro-BNP:   Thyroid Panel:   MRSA:  / MSSA:   Urinalysis Basic - ( 23 Dec 2023 01:23 )    Color: x / Appearance: x / SG: x / pH: x  Gluc: 131 mg/dL / Ketone: x  / Bili: x / Urobili: x   Blood: x / Protein: x / Nitrite: x   Leuk Esterase: x / RBC: x / WBC x   Sq Epi: x / Non Sq Epi: x / Bacteria: x        CXR: < from: Xray Chest 1 View- PORTABLE-Routine (Xray Chest 1 View- PORTABLE-Routine .) (12.23.23 @ 09:40) >  FINDINGS:  IABP marker is 1.8cm below the aortic knob.  The cardiomediastinal silhouette is within normal limits.  Lungs are clear. No pleural effusion or pneumothorax.    IMPRESSION:    IABP marker as above.    < end of copied text >      EKG:< from: 12 Lead ECG (12.21.23 @ 05:31) >  Ventricular Rate 73 BPM    Atrial Rate 73 BPM    P-R Interval 202 ms    QRS Duration 112 ms    Q-T Interval 384 ms    QTC Calculation(Bazett) 423 ms    P Axis 31 degrees    R Axis -6 degrees    T Axis 204 degrees    Diagnosis Line NORMAL SINUS RHYTHM  LEFT VENTRICULAR HYPERTROPHY WITH REPOLARIZATION ABNORMALITY AND QRS WIDENING  ABNORMAL ECG  WHEN COMPARED WITH ECG OF 21-DEC-2023 04:12, (UNCONFIRMED)  NO SIGNIFICANT CHANGE WAS FOUND    < end of copied text >      Carotid Duplex:    < from: VA Duplex Carotid, Bilat (11.11.23 @ 13:44) >  RIGHT:  PROX CCA = 97 cm/s  DIST CCA = 68 cm/s  PROX ICA = 70 cm/s  DIST ICA = 67 cm/s  ECA = 80 cm/s    LEFT:  PROX CCA = 110 cm/s  DIST CCA = 93 cm/s  PROX ICA = 68 cm/s  DIST ICA = 56 cm/s  ECA = 75 cm/s    Antegrade flow is noted within both vertebral arteries.    IMPRESSION: No significant hemodynamic stenosis of either carotid artery.    < end of copied text >    PFT's:    Echocardiogram:< from: TTE Limited W or WO Ultrasound Enhancing Agent (11.21.23 @ 07:35) >  CONCLUSIONS:      1. Unable to rule out endocarditis.   2. Compared to the transthoracic echocardiogram performed on 11/11/2023 Current study is limited to assess for endocarditis.   3. Technically difficult image quality.    ________________________________________________________________________________________  FINDINGS:     Aortic Valve:  The aortic valve was not well visualized.     Mitral Valve:  Structurally normal mitral valvewith normal leaflet excursion. There is mild mitral regurgitation.     Tricuspid Valve:  Structurally normal tricuspid valve with normal leaflet excursion.     Pulmonic Valve:  The pulmonic valve was not well visualized.    < end of copied text >      Cardiac catheterization:    Vein Mapping:    Gen: WN/WD NAD  Neuro: AAOx3, nonfocal  Pulm: CTA B/L  CV: RRR, S1S2  Abd: Soft, NT, ND +BS  Ext: No edema, + peripheral pulses  Skin: R fem prior IABP site with old blood at site. Dressing at L IABP site intact, site without drainage or hematoma    Pt has AICD/PPM [ ] Yes  [x ] No             Brand Name:  Pre-op Beta Blocker ordered within 24 hrs of surgery ([facility] ONLY)?  [ ] Yes  [ ] No  If not, Why?  Type & Cross  [ x] Yes  [ ] No  NPO after Midnight [x ] Yes  [ ] No  Pre-op ABX ordered, to be taped on chart:  [ ] Yes  [ ] No     Hibiclens/Peridex ordered [x ] Yes  [ ] No  Intraop on Hold: PRBCs, CXR, CHANDLER [ ]   Consent obtained  [x ] Yes  [ ] No

## 2023-12-24 NOTE — PROGRESS NOTE ADULT - SUBJECTIVE AND OBJECTIVE BOX
LD VALENCIA  58y Male  MRN:[id number]    Patient is a 58y old  Male who presents with a chief complaint of NSTEMI (01 Nov 2023 20:29)    HPI:  57yo M w/ hx HTN, CAD w/ 1 stent in 2009, ICH (2008) presenting with abn ekg. Patient presented to [facility] where he was found to have STEMI, recommended to get cath however patient did not want to get it there so it left and came here.  Patient initially had cough, congestion, fever, was placed on antibiotics on Sunday.  Started feeling nauseous and had a presyncopal event after which he presented to ED last night.  Had chest pain as well.  Chest pain is midsternal.  Not currently having chest pain.  Received 4 aspirin 30 min pta. (01 Nov 2023 15:11)      Patient seen and evaluated at bedside in CCU. interval events noted    Interval HPI: remain in ccu. IABP in place        PAST MEDICAL & SURGICAL HISTORY:  HTN (hypertension)      CAD (coronary artery disease)  2009; stent      Intracranial hemorrhage  2008      Respiratory arrest  december 1st      Myocardial infarction, unspecified MI type, unspecified artery      History of coronary artery stent placement          REVIEW OF SYSTEMS:  as per hpi     VITALS:   ICU Vital Signs Last 24 Hrs  T(C): 36.6 (24 Dec 2023 19:00), Max: 37 (24 Dec 2023 07:00)  T(F): 97.9 (24 Dec 2023 19:00), Max: 98.6 (24 Dec 2023 07:00)  HR: 75 (24 Dec 2023 20:00) (73 - 96)  BP: 94/48 (24 Dec 2023 15:23) (94/48 - 94/48)  BP(mean): 63 (24 Dec 2023 15:23) (63 - 63)  ABP: --  ABP(mean): --  RR: 21 (24 Dec 2023 20:00) (12 - 25)  SpO2: 97% (24 Dec 2023 20:00) (94% - 98%)    O2 Parameters below as of 24 Dec 2023 20:00  Patient On (Oxygen Delivery Method): room air             PHYSICAL EXAM:  GENERAL: NAD, comfortable   HEAD:  Atraumatic, Normocephalic  EYES: EOMI, PERRLA, conjunctiva and sclera clear  NECK: Supple, No JVD   CHEST/LUNG: Clear to auscultation bilaterally; No wheeze  HEART: Regular rate and rhythm; No murmurs, rubs, or gallops  ABDOMEN: Soft, Nontender, Nondistended; Bowel sounds present  EXTREMITIES:  2+ Peripheral Pulses, No clubbing, cyanosis, or edema  NEUROLOGY: nonfocal  SKIN: +rash  +iabp    Consultant(s) Notes Reviewed:  [x ] YES  [ ] NO  Care Discussed with Consultants/Other Providers [ x] YES  [ ] NO    MEDS:   MEDICATIONS  (STANDING):  aspirin  chewable 81 milliGRAM(s) Oral daily  atorvastatin 80 milliGRAM(s) Oral at bedtime  bisacodyl 5 milliGRAM(s) Oral every 12 hours  budesonide  80 MICROgram(s)/formoterol 4.5 MICROgram(s) Inhaler 2 Puff(s) Inhalation two times a day  carvedilol 25 milliGRAM(s) Oral every 12 hours  cefepime   IVPB 1000 milliGRAM(s) IV Intermittent once  chlorhexidine 0.12% Liquid 5 milliLiter(s) Swish and Spit two times a day  chlorhexidine 2% Cloths 1 Application(s) Topical daily  chlorhexidine 4% Liquid 1 Application(s) Topical once  DAPTOmycin IVPB 500 milliGRAM(s) IV Intermittent once  heparin  Infusion 1300 Unit(s)/Hr (14 mL/Hr) IV Continuous <Continuous>  hepatitis B Vaccine - Adult (HEPLISAV-B) 20 MICROGram(s) IntraMuscular once  hydrALAZINE 50 milliGRAM(s) Oral every 8 hours  insulin glargine Injectable (LANTUS) 12 Unit(s) SubCutaneous at bedtime  insulin lispro (ADMELOG) corrective regimen sliding scale   SubCutaneous at bedtime  insulin lispro (ADMELOG) corrective regimen sliding scale   SubCutaneous three times a day before meals  insulin lispro Injectable (ADMELOG) 9 Unit(s) SubCutaneous three times a day before meals  isosorbide   dinitrate Tablet (ISORDIL) 30 milliGRAM(s) Oral three times a day  methylPREDNISolone sodium succinate IVPB 500 milliGRAM(s) IV Intermittent once  methylPREDNISolone sodium succinate IVPB 500 milliGRAM(s) IV Intermittent once  mycophenolate mofetil IVPB 1000 milliGRAM(s) IV Intermittent once  polyethylene glycol 3350 17 Gram(s) Oral two times a day  senna 2 Tablet(s) Oral at bedtime    MEDICATIONS  (PRN):  hydrOXYzine hydrochloride 25 milliGRAM(s) Oral two times a day PRN Anxiety      ALLERGIES:  penicillins (Unknown)      LABS:                                                                                             12.5   8.27  )-----------( 203      ( 24 Dec 2023 17:10 )             37.5   12-24    136  |  106  |  28<H>  ----------------------------<  129<H>  3.9   |  18<L>  |  1.20    Ca    9.9      24 Dec 2023 17:10  Phos  3.5     12-23  Mg     1.8     12-23    TPro  6.6  /  Alb  3.7  /  TBili  0.3  /  DBili  x   /  AST  25  /  ALT  55<H>  /  AlkPhos  60  12-24      < from: CT Abdomen and Pelvis No Cont (11.28.23 @ 03:38) >  IMPRESSION:  Mildly dilated colon and prominent but not overly dilated small bowel   with air-fluid levels. No discrete transition point. Findings are   suggestive of ileus.    Intra-aortic balloon pump with the inferior marker at the level of the   inferior mesenteric artery and the balloon overlying the origins of the   renal arteries, celiac artery, and superior mesenteric artery. Consider   repositioning. Concern for IABP positioning was discussed with LANETTE Hawthorne   on 11/28/2023 at 3:42 AM by Dr. Shepard.    < end of copied text >          < from: Colonoscopy (11.17.23 @ 10:35) >                                                                                                        Impression:          - The entire examined colon is normal on direct and retroflexion views.                       - No specimens collected.  Recommendation:      - Resume previous diet today.                       - No large polyps or masses detected, No objection from GI standpoint to                 proceed with heart transplantation/advanced heart therapies.                       - Please call back should any further questions or concerns arise.    < end of copied text >     < from: Xray Chest 1 View- PORTABLE-Urgent (11.01.23 @ 07:42) >    IMPRESSION:  Clear lungs.    ---End of Report ---        < end of copied text >  < from: TTE W or WO Ultrasound Enhancing Agent (11.01.23 @ 10:23) >  _____________________________     CONCLUSIONS:      1. Left ventricular cavity is moderately dilated. Left ventricular wall thickness is normal. Left ventricular systolic function is severely decreased with an ejection fraction of 32 % by Chinchilla's method of disks. Regional wall motion abnormalities present.   2. Multiple segmental abnormalities exist. See findings.   3. There is moderate (grade 2) left ventricular diastolic dysfunction, with indeterminant filling pressure.   4. Normal right ventricular cavity size, wall thickness, and systolic function.   5. No significant valvular disease.   6. No pericardial effusion seen.   7. Compared to the transthoracic echocardiogram performed on 1/25/2017 the areas of akinesis are unchanged but there has been a decline in LV systolic function with new areas of hypokinesis.    __________________________________________________________________    < end of copied text >  < from: TTE Limited W or WO Ultrasound Enhancing Agent (11.02.23 @ 07:41) >  __________________________     CONCLUSIONS:      1. After obtaining consent, Definity ultrasound enhancing agent was given for enhanced left ventricular opacification and improved delineation of the left ventricular endocardial borders. Left ventricular systolic function is severely decreased with a calculated ejection fraction of 22 % by the Chinchilla's biplane method of disks. There is a left ventricular thrombus.   2. Findings were discussed with Litzy BOSS on 11/2/2023 at 8.49am.   3. There is a left ventricular thrombus.    _________________________________________________________________    < end of copied text >   Quality 402: Tobacco Use And Help With Quitting Among Adolescents: Patient screened for tobacco and is a smoker AND received Cessation Counseling Quality 431: Preventive Care And Screening: Unhealthy Alcohol Use - Screening: Patient screened for unhealthy alcohol use using a single question and scores 2 or greater episodes per year and brief intervention occurred Detail Level: Detailed LD VALENCIA  58y Male  MRN:[id number]    Patient is a 58y old  Male who presents with a chief complaint of NSTEMI (01 Nov 2023 20:29)    HPI:  59yo M w/ hx HTN, CAD w/ 1 stent in 2009, ICH (2008) presenting with abn ekg. Patient presented to [facility] where he was found to have STEMI, recommended to get cath however patient did not want to get it there so it left and came here.  Patient initially had cough, congestion, fever, was placed on antibiotics on Sunday.  Started feeling nauseous and had a presyncopal event after which he presented to ED last night.  Had chest pain as well.  Chest pain is midsternal.  Not currently having chest pain.  Received 4 aspirin 30 min pta. (01 Nov 2023 15:11)      Patient seen and evaluated at bedside in CCU. interval events noted    Interval HPI: remain in ccu. IABP in place        PAST MEDICAL & SURGICAL HISTORY:  HTN (hypertension)      CAD (coronary artery disease)  2009; stent      Intracranial hemorrhage  2008      Respiratory arrest  december 1st      Myocardial infarction, unspecified MI type, unspecified artery      History of coronary artery stent placement          REVIEW OF SYSTEMS:  as per hpi     VITALS:   ICU Vital Signs Last 24 Hrs  T(C): 36.6 (24 Dec 2023 19:00), Max: 37 (24 Dec 2023 07:00)  T(F): 97.9 (24 Dec 2023 19:00), Max: 98.6 (24 Dec 2023 07:00)  HR: 75 (24 Dec 2023 20:00) (73 - 96)  BP: 94/48 (24 Dec 2023 15:23) (94/48 - 94/48)  BP(mean): 63 (24 Dec 2023 15:23) (63 - 63)  ABP: --  ABP(mean): --  RR: 21 (24 Dec 2023 20:00) (12 - 25)  SpO2: 97% (24 Dec 2023 20:00) (94% - 98%)    O2 Parameters below as of 24 Dec 2023 20:00  Patient On (Oxygen Delivery Method): room air             PHYSICAL EXAM:  GENERAL: NAD, comfortable   HEAD:  Atraumatic, Normocephalic  EYES: EOMI, PERRLA, conjunctiva and sclera clear  NECK: Supple, No JVD   CHEST/LUNG: Clear to auscultation bilaterally; No wheeze  HEART: Regular rate and rhythm; No murmurs, rubs, or gallops  ABDOMEN: Soft, Nontender, Nondistended; Bowel sounds present  EXTREMITIES:  2+ Peripheral Pulses, No clubbing, cyanosis, or edema  NEUROLOGY: nonfocal  SKIN: +rash  +iabp    Consultant(s) Notes Reviewed:  [x ] YES  [ ] NO  Care Discussed with Consultants/Other Providers [ x] YES  [ ] NO    MEDS:   MEDICATIONS  (STANDING):  aspirin  chewable 81 milliGRAM(s) Oral daily  atorvastatin 80 milliGRAM(s) Oral at bedtime  bisacodyl 5 milliGRAM(s) Oral every 12 hours  budesonide  80 MICROgram(s)/formoterol 4.5 MICROgram(s) Inhaler 2 Puff(s) Inhalation two times a day  carvedilol 25 milliGRAM(s) Oral every 12 hours  cefepime   IVPB 1000 milliGRAM(s) IV Intermittent once  chlorhexidine 0.12% Liquid 5 milliLiter(s) Swish and Spit two times a day  chlorhexidine 2% Cloths 1 Application(s) Topical daily  chlorhexidine 4% Liquid 1 Application(s) Topical once  DAPTOmycin IVPB 500 milliGRAM(s) IV Intermittent once  heparin  Infusion 1300 Unit(s)/Hr (14 mL/Hr) IV Continuous <Continuous>  hepatitis B Vaccine - Adult (HEPLISAV-B) 20 MICROGram(s) IntraMuscular once  hydrALAZINE 50 milliGRAM(s) Oral every 8 hours  insulin glargine Injectable (LANTUS) 12 Unit(s) SubCutaneous at bedtime  insulin lispro (ADMELOG) corrective regimen sliding scale   SubCutaneous at bedtime  insulin lispro (ADMELOG) corrective regimen sliding scale   SubCutaneous three times a day before meals  insulin lispro Injectable (ADMELOG) 9 Unit(s) SubCutaneous three times a day before meals  isosorbide   dinitrate Tablet (ISORDIL) 30 milliGRAM(s) Oral three times a day  methylPREDNISolone sodium succinate IVPB 500 milliGRAM(s) IV Intermittent once  methylPREDNISolone sodium succinate IVPB 500 milliGRAM(s) IV Intermittent once  mycophenolate mofetil IVPB 1000 milliGRAM(s) IV Intermittent once  polyethylene glycol 3350 17 Gram(s) Oral two times a day  senna 2 Tablet(s) Oral at bedtime    MEDICATIONS  (PRN):  hydrOXYzine hydrochloride 25 milliGRAM(s) Oral two times a day PRN Anxiety      ALLERGIES:  penicillins (Unknown)      LABS:                                                                                             12.5   8.27  )-----------( 203      ( 24 Dec 2023 17:10 )             37.5   12-24    136  |  106  |  28<H>  ----------------------------<  129<H>  3.9   |  18<L>  |  1.20    Ca    9.9      24 Dec 2023 17:10  Phos  3.5     12-23  Mg     1.8     12-23    TPro  6.6  /  Alb  3.7  /  TBili  0.3  /  DBili  x   /  AST  25  /  ALT  55<H>  /  AlkPhos  60  12-24      < from: CT Abdomen and Pelvis No Cont (11.28.23 @ 03:38) >  IMPRESSION:  Mildly dilated colon and prominent but not overly dilated small bowel   with air-fluid levels. No discrete transition point. Findings are   suggestive of ileus.    Intra-aortic balloon pump with the inferior marker at the level of the   inferior mesenteric artery and the balloon overlying the origins of the   renal arteries, celiac artery, and superior mesenteric artery. Consider   repositioning. Concern for IABP positioning was discussed with LANETTE Hawthorne   on 11/28/2023 at 3:42 AM by Dr. Shepard.    < end of copied text >          < from: Colonoscopy (11.17.23 @ 10:35) >                                                                                                        Impression:          - The entire examined colon is normal on direct and retroflexion views.                       - No specimens collected.  Recommendation:      - Resume previous diet today.                       - No large polyps or masses detected, No objection from GI standpoint to                 proceed with heart transplantation/advanced heart therapies.                       - Please call back should any further questions or concerns arise.    < end of copied text >     < from: Xray Chest 1 View- PORTABLE-Urgent (11.01.23 @ 07:42) >    IMPRESSION:  Clear lungs.    ---End of Report ---        < end of copied text >  < from: TTE W or WO Ultrasound Enhancing Agent (11.01.23 @ 10:23) >  _____________________________     CONCLUSIONS:      1. Left ventricular cavity is moderately dilated. Left ventricular wall thickness is normal. Left ventricular systolic function is severely decreased with an ejection fraction of 32 % by Chinchilla's method of disks. Regional wall motion abnormalities present.   2. Multiple segmental abnormalities exist. See findings.   3. There is moderate (grade 2) left ventricular diastolic dysfunction, with indeterminant filling pressure.   4. Normal right ventricular cavity size, wall thickness, and systolic function.   5. No significant valvular disease.   6. No pericardial effusion seen.   7. Compared to the transthoracic echocardiogram performed on 1/25/2017 the areas of akinesis are unchanged but there has been a decline in LV systolic function with new areas of hypokinesis.    __________________________________________________________________    < end of copied text >  < from: TTE Limited W or WO Ultrasound Enhancing Agent (11.02.23 @ 07:41) >  __________________________     CONCLUSIONS:      1. After obtaining consent, Definity ultrasound enhancing agent was given for enhanced left ventricular opacification and improved delineation of the left ventricular endocardial borders. Left ventricular systolic function is severely decreased with a calculated ejection fraction of 22 % by the Chinchilla's biplane method of disks. There is a left ventricular thrombus.   2. Findings were discussed with Litzy BOSS on 11/2/2023 at 8.49am.   3. There is a left ventricular thrombus.    _________________________________________________________________    < end of copied text >

## 2023-12-24 NOTE — PROGRESS NOTE ADULT - PROBLEM SELECTOR PLAN 1
-Listen UNOS status 2.   - Changed from RFA IABP to LFA IABP on 12/21. On AC with Heparin infusion (also for LV thrombus)  - Continue Coreg 25 mg BID hold for MAP <65  - Increase HDZN to 75 mg TID given MAPs in 80s and continue ISDN 30 mg TID, hold for MAP <65  - Off romel given HyperK  - AT evaluation: Accepted for transplant, currently listed UNOS Status 2. ABO A. PRA 0% 12/12.    However, downgraded to status 7 on 12/4 given + blood culture. But now re-upgraded to status 2, awaiting suitable donor. Keep NPO  - Please keep primary Dr. Banuelos [phone] in the loop per family request. -Listen UNOS status 2.   - Changed from RFA IABP to LFA IABP on 12/21. On AC with Heparin infusion (also for LV thrombus)  - Continue Coreg 25 mg BID hold for MAP <65  - Increase HDZN to 75 mg TID given MAPs in 80s and continue ISDN 30 mg TID, hold for MAP <65  - Off romel given HyperK  - AT evaluation: Accepted for transplant, currently listed UNOS Status 2. ABO A. PRA 0% 12/12.    However, downgraded to status 7 on 12/4 given + blood culture. But now re-upgraded to status 2, awaiting suitable donor. Keep NPO  - Please keep primary Dr. Banuelos [phone] in the loop per family request.

## 2023-12-24 NOTE — PRE-ANESTHESIA EVALUATION ADULT - NSANTHPMHFT_GEN_ALL_CORE
58 male with HTN, CAD (s/p PCI 2008), HFrEF, CVA 2018, and T2DM presenting with chest pressure and unknown tachycardia that was shocked x1, [facility] 11/1 found to have in-stent restenosis of pLAD and  of RCA, admitted to CICU for management of cardiogenic shock and ADHF requiring IABP 11/1 -11/7, with hospital course c/b vfib arrest requiring reinsertion of IABP, 58 male with HTN, CAD (s/p PCI 2008), HFrEF, CVA 2018, and T2DM presenting with chest pressure and unknown tachycardia that was shocked x1, [facility] 11/1 found to have in-stent restenosis of pLAD and  of RCA, admitted to CICU for management of cardiogenic shock and ADHF requiring IABP 11/1 -11/7, with hospital course c/b vfib arrest requiring reinsertion of IABP, 58 male with HTN, CAD (s/p PCI 2008), HFrEF, CVA 2018, and T2DM presenting with chest pressure and unknown tachycardia that was shocked x1, [facility] 11/1 found to have in-stent restenosis of pLAD and  of RCA, admitted to CICU for management of cardiogenic shock and ADHF requiring IABP 11/1 -11/7, with hospital course c/b vfib arrest requiring reinsertion of IABP,    currently denies CP/SOB; hx of dysphagia 58 male with HTN, CAD (s/p PCI 2008), HFrEF, CVA 2018, and T2DM presenting with chest pressure and unknown tachycardia that was shocked x1, [facility] 11/1 found to have in-stent restenosis of pLAD and  of RCA, admitted to CICU for management of cardiogenic shock and ADHF requiring IABP 11/1 -11/7, with hospital course c/b vfib arrest requiring reinsertion of IABP,    currently denies CP/SOB; hx of dysphagia

## 2023-12-25 ENCOUNTER — APPOINTMENT (OUTPATIENT)
Dept: CARDIOTHORACIC SURGERY | Facility: HOSPITAL | Age: 58
End: 2023-12-25

## 2023-12-25 ENCOUNTER — RESULT REVIEW (OUTPATIENT)
Age: 58
End: 2023-12-25

## 2023-12-25 DIAGNOSIS — D84.9 IMMUNODEFICIENCY, UNSPECIFIED: ICD-10-CM

## 2023-12-25 DIAGNOSIS — Z79.2 LONG TERM (CURRENT) USE OF ANTIBIOTICS: ICD-10-CM

## 2023-12-25 DIAGNOSIS — Z94.1 HEART TRANSPLANT STATUS: ICD-10-CM

## 2023-12-25 LAB
ALBUMIN SERPL ELPH-MCNC: 3.7 G/DL — SIGNIFICANT CHANGE UP (ref 3.3–5)
ALBUMIN SERPL ELPH-MCNC: 3.7 G/DL — SIGNIFICANT CHANGE UP (ref 3.3–5)
ALP SERPL-CCNC: 55 U/L — SIGNIFICANT CHANGE UP (ref 40–120)
ALP SERPL-CCNC: 55 U/L — SIGNIFICANT CHANGE UP (ref 40–120)
ALT FLD-CCNC: 42 U/L — SIGNIFICANT CHANGE UP (ref 10–45)
ALT FLD-CCNC: 42 U/L — SIGNIFICANT CHANGE UP (ref 10–45)
ANION GAP SERPL CALC-SCNC: 14 MMOL/L — SIGNIFICANT CHANGE UP (ref 5–17)
ANION GAP SERPL CALC-SCNC: 14 MMOL/L — SIGNIFICANT CHANGE UP (ref 5–17)
APTT BLD: 29.2 SEC — SIGNIFICANT CHANGE UP (ref 24.5–35.6)
APTT BLD: 29.2 SEC — SIGNIFICANT CHANGE UP (ref 24.5–35.6)
AST SERPL-CCNC: 87 U/L — HIGH (ref 10–40)
AST SERPL-CCNC: 87 U/L — HIGH (ref 10–40)
BASE EXCESS BLDMV CALC-SCNC: -1.7 MMOL/L — SIGNIFICANT CHANGE UP (ref -3–3)
BASE EXCESS BLDMV CALC-SCNC: -1.7 MMOL/L — SIGNIFICANT CHANGE UP (ref -3–3)
BASE EXCESS BLDMV CALC-SCNC: 0.1 MMOL/L — SIGNIFICANT CHANGE UP (ref -3–3)
BASE EXCESS BLDMV CALC-SCNC: 0.1 MMOL/L — SIGNIFICANT CHANGE UP (ref -3–3)
BASE EXCESS BLDMV CALC-SCNC: 0.8 MMOL/L — SIGNIFICANT CHANGE UP (ref -3–3)
BASE EXCESS BLDMV CALC-SCNC: 0.8 MMOL/L — SIGNIFICANT CHANGE UP (ref -3–3)
BASE EXCESS BLDMV CALC-SCNC: 1 MMOL/L — SIGNIFICANT CHANGE UP (ref -3–3)
BASE EXCESS BLDMV CALC-SCNC: 1 MMOL/L — SIGNIFICANT CHANGE UP (ref -3–3)
BASE EXCESS BLDV CALC-SCNC: -2.1 MMOL/L — LOW (ref -2–3)
BASE EXCESS BLDV CALC-SCNC: -2.1 MMOL/L — LOW (ref -2–3)
BASE EXCESS BLDV CALC-SCNC: -3.5 MMOL/L — LOW (ref -2–3)
BASE EXCESS BLDV CALC-SCNC: -3.5 MMOL/L — LOW (ref -2–3)
BASE EXCESS BLDV CALC-SCNC: -5.5 MMOL/L — LOW (ref -2–3)
BASE EXCESS BLDV CALC-SCNC: -5.5 MMOL/L — LOW (ref -2–3)
BASE EXCESS BLDV CALC-SCNC: 0.9 MMOL/L — SIGNIFICANT CHANGE UP (ref -2–3)
BASE EXCESS BLDV CALC-SCNC: 0.9 MMOL/L — SIGNIFICANT CHANGE UP (ref -2–3)
BASOPHILS # BLD AUTO: 0.04 K/UL — SIGNIFICANT CHANGE UP (ref 0–0.2)
BASOPHILS # BLD AUTO: 0.04 K/UL — SIGNIFICANT CHANGE UP (ref 0–0.2)
BASOPHILS NFR BLD AUTO: 0.4 % — SIGNIFICANT CHANGE UP (ref 0–2)
BASOPHILS NFR BLD AUTO: 0.4 % — SIGNIFICANT CHANGE UP (ref 0–2)
BILIRUB DIRECT SERPL-MCNC: 0.4 MG/DL — HIGH (ref 0–0.3)
BILIRUB DIRECT SERPL-MCNC: 0.4 MG/DL — HIGH (ref 0–0.3)
BILIRUB INDIRECT FLD-MCNC: 0.6 MG/DL — SIGNIFICANT CHANGE UP (ref 0.2–1)
BILIRUB INDIRECT FLD-MCNC: 0.6 MG/DL — SIGNIFICANT CHANGE UP (ref 0.2–1)
BILIRUB SERPL-MCNC: 1 MG/DL — SIGNIFICANT CHANGE UP (ref 0.2–1.2)
BILIRUB SERPL-MCNC: 1 MG/DL — SIGNIFICANT CHANGE UP (ref 0.2–1.2)
BUN SERPL-MCNC: 28 MG/DL — HIGH (ref 7–23)
BUN SERPL-MCNC: 28 MG/DL — HIGH (ref 7–23)
CA-I SERPL-SCNC: 1.09 MMOL/L — LOW (ref 1.15–1.33)
CA-I SERPL-SCNC: 1.09 MMOL/L — LOW (ref 1.15–1.33)
CA-I SERPL-SCNC: 1.1 MMOL/L — LOW (ref 1.15–1.33)
CA-I SERPL-SCNC: 1.1 MMOL/L — LOW (ref 1.15–1.33)
CA-I SERPL-SCNC: 1.11 MMOL/L — LOW (ref 1.15–1.33)
CA-I SERPL-SCNC: 1.11 MMOL/L — LOW (ref 1.15–1.33)
CA-I SERPL-SCNC: 1.16 MMOL/L — SIGNIFICANT CHANGE UP (ref 1.15–1.33)
CA-I SERPL-SCNC: 1.16 MMOL/L — SIGNIFICANT CHANGE UP (ref 1.15–1.33)
CALCIUM SERPL-MCNC: 9.1 MG/DL — SIGNIFICANT CHANGE UP (ref 8.4–10.5)
CALCIUM SERPL-MCNC: 9.1 MG/DL — SIGNIFICANT CHANGE UP (ref 8.4–10.5)
CHLORIDE BLDV-SCNC: 101 MMOL/L — SIGNIFICANT CHANGE UP (ref 96–108)
CHLORIDE BLDV-SCNC: 101 MMOL/L — SIGNIFICANT CHANGE UP (ref 96–108)
CHLORIDE BLDV-SCNC: 102 MMOL/L — SIGNIFICANT CHANGE UP (ref 96–108)
CHLORIDE BLDV-SCNC: 102 MMOL/L — SIGNIFICANT CHANGE UP (ref 96–108)
CHLORIDE BLDV-SCNC: 103 MMOL/L — SIGNIFICANT CHANGE UP (ref 96–108)
CHLORIDE SERPL-SCNC: 103 MMOL/L — SIGNIFICANT CHANGE UP (ref 96–108)
CHLORIDE SERPL-SCNC: 103 MMOL/L — SIGNIFICANT CHANGE UP (ref 96–108)
CMV IGG FLD QL: 4 U/ML — HIGH
CMV IGG FLD QL: 4 U/ML — HIGH
CMV IGG SERPL-IMP: POSITIVE
CMV IGG SERPL-IMP: POSITIVE
CO2 BLDMV-SCNC: 26 MMOL/L — SIGNIFICANT CHANGE UP (ref 21–29)
CO2 BLDMV-SCNC: 26 MMOL/L — SIGNIFICANT CHANGE UP (ref 21–29)
CO2 BLDMV-SCNC: 27 MMOL/L — SIGNIFICANT CHANGE UP (ref 21–29)
CO2 BLDMV-SCNC: 28 MMOL/L — SIGNIFICANT CHANGE UP (ref 21–29)
CO2 BLDMV-SCNC: 28 MMOL/L — SIGNIFICANT CHANGE UP (ref 21–29)
CO2 BLDV-SCNC: 23 MMOL/L — SIGNIFICANT CHANGE UP (ref 22–26)
CO2 BLDV-SCNC: 23 MMOL/L — SIGNIFICANT CHANGE UP (ref 22–26)
CO2 BLDV-SCNC: 24 MMOL/L — SIGNIFICANT CHANGE UP (ref 22–26)
CO2 BLDV-SCNC: 24 MMOL/L — SIGNIFICANT CHANGE UP (ref 22–26)
CO2 BLDV-SCNC: 26 MMOL/L — SIGNIFICANT CHANGE UP (ref 22–26)
CO2 BLDV-SCNC: 26 MMOL/L — SIGNIFICANT CHANGE UP (ref 22–26)
CO2 BLDV-SCNC: 28 MMOL/L — HIGH (ref 22–26)
CO2 BLDV-SCNC: 28 MMOL/L — HIGH (ref 22–26)
CO2 SERPL-SCNC: 24 MMOL/L — SIGNIFICANT CHANGE UP (ref 22–31)
CO2 SERPL-SCNC: 24 MMOL/L — SIGNIFICANT CHANGE UP (ref 22–31)
CREAT SERPL-MCNC: 1.29 MG/DL — SIGNIFICANT CHANGE UP (ref 0.5–1.3)
CREAT SERPL-MCNC: 1.29 MG/DL — SIGNIFICANT CHANGE UP (ref 0.5–1.3)
EGFR: 64 ML/MIN/1.73M2 — SIGNIFICANT CHANGE UP
EGFR: 64 ML/MIN/1.73M2 — SIGNIFICANT CHANGE UP
EOSINOPHIL # BLD AUTO: 0.06 K/UL — SIGNIFICANT CHANGE UP (ref 0–0.5)
EOSINOPHIL # BLD AUTO: 0.06 K/UL — SIGNIFICANT CHANGE UP (ref 0–0.5)
EOSINOPHIL NFR BLD AUTO: 0.5 % — SIGNIFICANT CHANGE UP (ref 0–6)
EOSINOPHIL NFR BLD AUTO: 0.5 % — SIGNIFICANT CHANGE UP (ref 0–6)
FIBRINOGEN PPP-MCNC: 469 MG/DL — HIGH (ref 200–445)
FIBRINOGEN PPP-MCNC: 469 MG/DL — HIGH (ref 200–445)
GAS PNL BLDA: SIGNIFICANT CHANGE UP
GAS PNL BLDMV: SIGNIFICANT CHANGE UP
GAS PNL BLDV: 132 MMOL/L — LOW (ref 136–145)
GAS PNL BLDV: 132 MMOL/L — LOW (ref 136–145)
GAS PNL BLDV: 134 MMOL/L — LOW (ref 136–145)
GAS PNL BLDV: 134 MMOL/L — LOW (ref 136–145)
GAS PNL BLDV: 135 MMOL/L — LOW (ref 136–145)
GAS PNL BLDV: SIGNIFICANT CHANGE UP
GLUCOSE BLDC GLUCOMTR-MCNC: 131 MG/DL — HIGH (ref 70–99)
GLUCOSE BLDC GLUCOMTR-MCNC: 131 MG/DL — HIGH (ref 70–99)
GLUCOSE BLDC GLUCOMTR-MCNC: 146 MG/DL — HIGH (ref 70–99)
GLUCOSE BLDC GLUCOMTR-MCNC: 146 MG/DL — HIGH (ref 70–99)
GLUCOSE BLDC GLUCOMTR-MCNC: 147 MG/DL — HIGH (ref 70–99)
GLUCOSE BLDC GLUCOMTR-MCNC: 147 MG/DL — HIGH (ref 70–99)
GLUCOSE BLDC GLUCOMTR-MCNC: 150 MG/DL — HIGH (ref 70–99)
GLUCOSE BLDC GLUCOMTR-MCNC: 150 MG/DL — HIGH (ref 70–99)
GLUCOSE BLDC GLUCOMTR-MCNC: 159 MG/DL — HIGH (ref 70–99)
GLUCOSE BLDC GLUCOMTR-MCNC: 159 MG/DL — HIGH (ref 70–99)
GLUCOSE BLDC GLUCOMTR-MCNC: 163 MG/DL — HIGH (ref 70–99)
GLUCOSE BLDC GLUCOMTR-MCNC: 163 MG/DL — HIGH (ref 70–99)
GLUCOSE BLDC GLUCOMTR-MCNC: 181 MG/DL — HIGH (ref 70–99)
GLUCOSE BLDC GLUCOMTR-MCNC: 181 MG/DL — HIGH (ref 70–99)
GLUCOSE BLDC GLUCOMTR-MCNC: 188 MG/DL — HIGH (ref 70–99)
GLUCOSE BLDC GLUCOMTR-MCNC: 188 MG/DL — HIGH (ref 70–99)
GLUCOSE BLDC GLUCOMTR-MCNC: 199 MG/DL — HIGH (ref 70–99)
GLUCOSE BLDC GLUCOMTR-MCNC: 199 MG/DL — HIGH (ref 70–99)
GLUCOSE BLDC GLUCOMTR-MCNC: 208 MG/DL — HIGH (ref 70–99)
GLUCOSE BLDC GLUCOMTR-MCNC: 208 MG/DL — HIGH (ref 70–99)
GLUCOSE BLDC GLUCOMTR-MCNC: 215 MG/DL — HIGH (ref 70–99)
GLUCOSE BLDC GLUCOMTR-MCNC: 218 MG/DL — HIGH (ref 70–99)
GLUCOSE BLDC GLUCOMTR-MCNC: 218 MG/DL — HIGH (ref 70–99)
GLUCOSE BLDC GLUCOMTR-MCNC: 226 MG/DL — HIGH (ref 70–99)
GLUCOSE BLDC GLUCOMTR-MCNC: 226 MG/DL — HIGH (ref 70–99)
GLUCOSE BLDV-MCNC: 150 MG/DL — HIGH (ref 70–99)
GLUCOSE BLDV-MCNC: 150 MG/DL — HIGH (ref 70–99)
GLUCOSE BLDV-MCNC: 171 MG/DL — HIGH (ref 70–99)
GLUCOSE BLDV-MCNC: 171 MG/DL — HIGH (ref 70–99)
GLUCOSE BLDV-MCNC: 183 MG/DL — HIGH (ref 70–99)
GLUCOSE BLDV-MCNC: 183 MG/DL — HIGH (ref 70–99)
GLUCOSE BLDV-MCNC: 197 MG/DL — HIGH (ref 70–99)
GLUCOSE BLDV-MCNC: 197 MG/DL — HIGH (ref 70–99)
GLUCOSE SERPL-MCNC: 247 MG/DL — HIGH (ref 70–99)
GLUCOSE SERPL-MCNC: 247 MG/DL — HIGH (ref 70–99)
GRAM STN FLD: SIGNIFICANT CHANGE UP
GRAM STN FLD: SIGNIFICANT CHANGE UP
HCO3 BLDMV-SCNC: 25 MMOL/L — SIGNIFICANT CHANGE UP (ref 20–28)
HCO3 BLDMV-SCNC: 26 MMOL/L — SIGNIFICANT CHANGE UP (ref 20–28)
HCO3 BLDMV-SCNC: 26 MMOL/L — SIGNIFICANT CHANGE UP (ref 20–28)
HCO3 BLDMV-SCNC: 27 MMOL/L — SIGNIFICANT CHANGE UP (ref 20–28)
HCO3 BLDMV-SCNC: 27 MMOL/L — SIGNIFICANT CHANGE UP (ref 20–28)
HCO3 BLDV-SCNC: 22 MMOL/L — SIGNIFICANT CHANGE UP (ref 22–29)
HCO3 BLDV-SCNC: 22 MMOL/L — SIGNIFICANT CHANGE UP (ref 22–29)
HCO3 BLDV-SCNC: 23 MMOL/L — SIGNIFICANT CHANGE UP (ref 22–29)
HCO3 BLDV-SCNC: 23 MMOL/L — SIGNIFICANT CHANGE UP (ref 22–29)
HCO3 BLDV-SCNC: 24 MMOL/L — SIGNIFICANT CHANGE UP (ref 22–29)
HCO3 BLDV-SCNC: 24 MMOL/L — SIGNIFICANT CHANGE UP (ref 22–29)
HCO3 BLDV-SCNC: 27 MMOL/L — SIGNIFICANT CHANGE UP (ref 22–29)
HCO3 BLDV-SCNC: 27 MMOL/L — SIGNIFICANT CHANGE UP (ref 22–29)
HCT VFR BLD CALC: 32.1 % — LOW (ref 39–50)
HCT VFR BLD CALC: 32.1 % — LOW (ref 39–50)
HCT VFR BLDA CALC: 26 % — LOW (ref 39–51)
HCT VFR BLDA CALC: 26 % — LOW (ref 39–51)
HCT VFR BLDA CALC: 29 % — LOW (ref 39–51)
HCT VFR BLDA CALC: 32 % — LOW (ref 39–51)
HCT VFR BLDA CALC: 32 % — LOW (ref 39–51)
HCV AB S/CO SERPL IA: 0.1 S/CO — SIGNIFICANT CHANGE UP (ref 0–0.99)
HCV AB S/CO SERPL IA: 0.1 S/CO — SIGNIFICANT CHANGE UP (ref 0–0.99)
HCV AB SERPL-IMP: SIGNIFICANT CHANGE UP
HCV AB SERPL-IMP: SIGNIFICANT CHANGE UP
HEPARINASE TEG R TIME: 11.2 MIN — HIGH (ref 4.3–8.3)
HEPARINASE TEG R TIME: 11.2 MIN — HIGH (ref 4.3–8.3)
HGB BLD CALC-MCNC: 10.6 G/DL — LOW (ref 12.6–17.4)
HGB BLD CALC-MCNC: 10.6 G/DL — LOW (ref 12.6–17.4)
HGB BLD CALC-MCNC: 8.6 G/DL — LOW (ref 12.6–17.4)
HGB BLD CALC-MCNC: 8.6 G/DL — LOW (ref 12.6–17.4)
HGB BLD CALC-MCNC: 9.7 G/DL — LOW (ref 12.6–17.4)
HGB BLD CALC-MCNC: 9.7 G/DL — LOW (ref 12.6–17.4)
HGB BLD CALC-MCNC: 9.8 G/DL — LOW (ref 12.6–17.4)
HGB BLD CALC-MCNC: 9.8 G/DL — LOW (ref 12.6–17.4)
HGB BLD-MCNC: 10.9 G/DL — LOW (ref 13–17)
HGB BLD-MCNC: 10.9 G/DL — LOW (ref 13–17)
HIV 1+2 AB+HIV1 P24 AG SERPL QL IA: SIGNIFICANT CHANGE UP
HIV 1+2 AB+HIV1 P24 AG SERPL QL IA: SIGNIFICANT CHANGE UP
HOROWITZ INDEX BLDMV+IHG-RTO: 40 — SIGNIFICANT CHANGE UP
HOROWITZ INDEX BLDV+IHG-RTO: SIGNIFICANT CHANGE UP
HOROWITZ INDEX BLDV+IHG-RTO: SIGNIFICANT CHANGE UP
IMM GRANULOCYTES NFR BLD AUTO: 0.5 % — SIGNIFICANT CHANGE UP (ref 0–0.9)
IMM GRANULOCYTES NFR BLD AUTO: 0.5 % — SIGNIFICANT CHANGE UP (ref 0–0.9)
INR BLD: 1.18 RATIO — SIGNIFICANT CHANGE UP (ref 0.85–1.18)
INR BLD: 1.18 RATIO — SIGNIFICANT CHANGE UP (ref 0.85–1.18)
LACTATE BLDV-MCNC: 0.6 MMOL/L — SIGNIFICANT CHANGE UP (ref 0.5–2)
LACTATE BLDV-MCNC: 0.6 MMOL/L — SIGNIFICANT CHANGE UP (ref 0.5–2)
LACTATE BLDV-MCNC: 0.7 MMOL/L — SIGNIFICANT CHANGE UP (ref 0.5–2)
LACTATE BLDV-MCNC: 0.7 MMOL/L — SIGNIFICANT CHANGE UP (ref 0.5–2)
LACTATE BLDV-MCNC: 0.9 MMOL/L — SIGNIFICANT CHANGE UP (ref 0.5–2)
LACTATE BLDV-MCNC: 0.9 MMOL/L — SIGNIFICANT CHANGE UP (ref 0.5–2)
LACTATE BLDV-MCNC: 1 MMOL/L — SIGNIFICANT CHANGE UP (ref 0.5–2)
LACTATE BLDV-MCNC: 1 MMOL/L — SIGNIFICANT CHANGE UP (ref 0.5–2)
LYMPHOCYTES # BLD AUTO: 1.09 K/UL — SIGNIFICANT CHANGE UP (ref 1–3.3)
LYMPHOCYTES # BLD AUTO: 1.09 K/UL — SIGNIFICANT CHANGE UP (ref 1–3.3)
LYMPHOCYTES # BLD AUTO: 9.6 % — LOW (ref 13–44)
LYMPHOCYTES # BLD AUTO: 9.6 % — LOW (ref 13–44)
MAGNESIUM SERPL-MCNC: 2.3 MG/DL — SIGNIFICANT CHANGE UP (ref 1.6–2.6)
MAGNESIUM SERPL-MCNC: 2.3 MG/DL — SIGNIFICANT CHANGE UP (ref 1.6–2.6)
MCHC RBC-ENTMCNC: 30 PG — SIGNIFICANT CHANGE UP (ref 27–34)
MCHC RBC-ENTMCNC: 30 PG — SIGNIFICANT CHANGE UP (ref 27–34)
MCHC RBC-ENTMCNC: 34 GM/DL — SIGNIFICANT CHANGE UP (ref 32–36)
MCHC RBC-ENTMCNC: 34 GM/DL — SIGNIFICANT CHANGE UP (ref 32–36)
MCV RBC AUTO: 88.4 FL — SIGNIFICANT CHANGE UP (ref 80–100)
MCV RBC AUTO: 88.4 FL — SIGNIFICANT CHANGE UP (ref 80–100)
MONOCYTES # BLD AUTO: 1.07 K/UL — HIGH (ref 0–0.9)
MONOCYTES # BLD AUTO: 1.07 K/UL — HIGH (ref 0–0.9)
MONOCYTES NFR BLD AUTO: 9.4 % — SIGNIFICANT CHANGE UP (ref 2–14)
MONOCYTES NFR BLD AUTO: 9.4 % — SIGNIFICANT CHANGE UP (ref 2–14)
NEUTROPHILS # BLD AUTO: 9.02 K/UL — HIGH (ref 1.8–7.4)
NEUTROPHILS # BLD AUTO: 9.02 K/UL — HIGH (ref 1.8–7.4)
NEUTROPHILS NFR BLD AUTO: 79.6 % — HIGH (ref 43–77)
NEUTROPHILS NFR BLD AUTO: 79.6 % — HIGH (ref 43–77)
NRBC # BLD: 0 /100 WBCS — SIGNIFICANT CHANGE UP (ref 0–0)
NRBC # BLD: 0 /100 WBCS — SIGNIFICANT CHANGE UP (ref 0–0)
O2 CT VFR BLD CALC: 41 MMHG — SIGNIFICANT CHANGE UP (ref 30–65)
O2 CT VFR BLD CALC: 41 MMHG — SIGNIFICANT CHANGE UP (ref 30–65)
O2 CT VFR BLD CALC: 43 MMHG — SIGNIFICANT CHANGE UP (ref 30–65)
O2 CT VFR BLD CALC: 43 MMHG — SIGNIFICANT CHANGE UP (ref 30–65)
O2 CT VFR BLD CALC: 44 MMHG — SIGNIFICANT CHANGE UP (ref 30–65)
O2 CT VFR BLD CALC: 44 MMHG — SIGNIFICANT CHANGE UP (ref 30–65)
O2 CT VFR BLD CALC: 51 MMHG — SIGNIFICANT CHANGE UP (ref 30–65)
O2 CT VFR BLD CALC: 51 MMHG — SIGNIFICANT CHANGE UP (ref 30–65)
PCO2 BLDMV: 39 MMHG — SIGNIFICANT CHANGE UP (ref 30–65)
PCO2 BLDMV: 39 MMHG — SIGNIFICANT CHANGE UP (ref 30–65)
PCO2 BLDMV: 43 MMHG — SIGNIFICANT CHANGE UP (ref 30–65)
PCO2 BLDMV: 43 MMHG — SIGNIFICANT CHANGE UP (ref 30–65)
PCO2 BLDMV: 45 MMHG — SIGNIFICANT CHANGE UP (ref 30–65)
PCO2 BLDMV: 45 MMHG — SIGNIFICANT CHANGE UP (ref 30–65)
PCO2 BLDMV: 50 MMHG — SIGNIFICANT CHANGE UP (ref 30–65)
PCO2 BLDMV: 50 MMHG — SIGNIFICANT CHANGE UP (ref 30–65)
PCO2 BLDV: 45 MMHG — SIGNIFICANT CHANGE UP (ref 42–55)
PCO2 BLDV: 45 MMHG — SIGNIFICANT CHANGE UP (ref 42–55)
PCO2 BLDV: 46 MMHG — SIGNIFICANT CHANGE UP (ref 42–55)
PCO2 BLDV: 46 MMHG — SIGNIFICANT CHANGE UP (ref 42–55)
PCO2 BLDV: 48 MMHG — SIGNIFICANT CHANGE UP (ref 42–55)
PH BLDMV: 7.31 — LOW (ref 7.32–7.45)
PH BLDMV: 7.31 — LOW (ref 7.32–7.45)
PH BLDMV: 7.38 — SIGNIFICANT CHANGE UP (ref 7.32–7.45)
PH BLDMV: 7.38 — SIGNIFICANT CHANGE UP (ref 7.32–7.45)
PH BLDMV: 7.39 — SIGNIFICANT CHANGE UP (ref 7.32–7.45)
PH BLDMV: 7.39 — SIGNIFICANT CHANGE UP (ref 7.32–7.45)
PH BLDMV: 7.41 — SIGNIFICANT CHANGE UP (ref 7.32–7.45)
PH BLDMV: 7.41 — SIGNIFICANT CHANGE UP (ref 7.32–7.45)
PH BLDV: 7.26 — LOW (ref 7.32–7.43)
PH BLDV: 7.26 — LOW (ref 7.32–7.43)
PH BLDV: 7.31 — LOW (ref 7.32–7.43)
PH BLDV: 7.37 — SIGNIFICANT CHANGE UP (ref 7.32–7.43)
PH BLDV: 7.37 — SIGNIFICANT CHANGE UP (ref 7.32–7.43)
PHOSPHATE SERPL-MCNC: 4 MG/DL — SIGNIFICANT CHANGE UP (ref 2.5–4.5)
PHOSPHATE SERPL-MCNC: 4 MG/DL — SIGNIFICANT CHANGE UP (ref 2.5–4.5)
PLATELET # BLD AUTO: 240 K/UL — SIGNIFICANT CHANGE UP (ref 150–400)
PLATELET # BLD AUTO: 240 K/UL — SIGNIFICANT CHANGE UP (ref 150–400)
PO2 BLDV: 111 MMHG — HIGH (ref 25–45)
PO2 BLDV: 111 MMHG — HIGH (ref 25–45)
PO2 BLDV: 216 MMHG — HIGH (ref 25–45)
PO2 BLDV: 216 MMHG — HIGH (ref 25–45)
PO2 BLDV: 54 MMHG — HIGH (ref 25–45)
PO2 BLDV: 54 MMHG — HIGH (ref 25–45)
PO2 BLDV: 67 MMHG — HIGH (ref 25–45)
PO2 BLDV: 67 MMHG — HIGH (ref 25–45)
POTASSIUM BLDV-SCNC: 4.3 MMOL/L — SIGNIFICANT CHANGE UP (ref 3.5–5.1)
POTASSIUM BLDV-SCNC: 4.3 MMOL/L — SIGNIFICANT CHANGE UP (ref 3.5–5.1)
POTASSIUM BLDV-SCNC: 5.5 MMOL/L — HIGH (ref 3.5–5.1)
POTASSIUM BLDV-SCNC: 5.5 MMOL/L — HIGH (ref 3.5–5.1)
POTASSIUM BLDV-SCNC: 5.7 MMOL/L — HIGH (ref 3.5–5.1)
POTASSIUM BLDV-SCNC: 5.7 MMOL/L — HIGH (ref 3.5–5.1)
POTASSIUM BLDV-SCNC: 6 MMOL/L — HIGH (ref 3.5–5.1)
POTASSIUM BLDV-SCNC: 6 MMOL/L — HIGH (ref 3.5–5.1)
POTASSIUM SERPL-MCNC: 4.4 MMOL/L — SIGNIFICANT CHANGE UP (ref 3.5–5.3)
POTASSIUM SERPL-MCNC: 4.4 MMOL/L — SIGNIFICANT CHANGE UP (ref 3.5–5.3)
POTASSIUM SERPL-SCNC: 4.4 MMOL/L — SIGNIFICANT CHANGE UP (ref 3.5–5.3)
POTASSIUM SERPL-SCNC: 4.4 MMOL/L — SIGNIFICANT CHANGE UP (ref 3.5–5.3)
PROT SERPL-MCNC: 6.2 G/DL — SIGNIFICANT CHANGE UP (ref 6–8.3)
PROT SERPL-MCNC: 6.2 G/DL — SIGNIFICANT CHANGE UP (ref 6–8.3)
PROTHROM AB SERPL-ACNC: 12.9 SEC — SIGNIFICANT CHANGE UP (ref 9.5–13)
PROTHROM AB SERPL-ACNC: 12.9 SEC — SIGNIFICANT CHANGE UP (ref 9.5–13)
RAPIDTEG MAXIMUM AMPLITUDE: 60.6 MM — SIGNIFICANT CHANGE UP (ref 52–70)
RAPIDTEG MAXIMUM AMPLITUDE: 60.6 MM — SIGNIFICANT CHANGE UP (ref 52–70)
RBC # BLD: 3.63 M/UL — LOW (ref 4.2–5.8)
RBC # BLD: 3.63 M/UL — LOW (ref 4.2–5.8)
RBC # FLD: 15.3 % — HIGH (ref 10.3–14.5)
RBC # FLD: 15.3 % — HIGH (ref 10.3–14.5)
SAO2 % BLDMV: 71.7 — SIGNIFICANT CHANGE UP (ref 60–90)
SAO2 % BLDMV: 71.7 — SIGNIFICANT CHANGE UP (ref 60–90)
SAO2 % BLDMV: 74 — SIGNIFICANT CHANGE UP (ref 60–90)
SAO2 % BLDMV: 74 — SIGNIFICANT CHANGE UP (ref 60–90)
SAO2 % BLDMV: 74.1 — SIGNIFICANT CHANGE UP (ref 60–90)
SAO2 % BLDMV: 74.1 — SIGNIFICANT CHANGE UP (ref 60–90)
SAO2 % BLDMV: 82.2 — SIGNIFICANT CHANGE UP (ref 60–90)
SAO2 % BLDMV: 82.2 — SIGNIFICANT CHANGE UP (ref 60–90)
SAO2 % BLDV: 86.8 % — SIGNIFICANT CHANGE UP (ref 67–88)
SAO2 % BLDV: 86.8 % — SIGNIFICANT CHANGE UP (ref 67–88)
SAO2 % BLDV: 93.5 % — HIGH (ref 67–88)
SAO2 % BLDV: 93.5 % — HIGH (ref 67–88)
SAO2 % BLDV: 99.7 % — HIGH (ref 67–88)
SAO2 % BLDV: 99.7 % — HIGH (ref 67–88)
SAO2 % BLDV: 99.8 % — HIGH (ref 67–88)
SAO2 % BLDV: 99.8 % — HIGH (ref 67–88)
SODIUM SERPL-SCNC: 141 MMOL/L — SIGNIFICANT CHANGE UP (ref 135–145)
SODIUM SERPL-SCNC: 141 MMOL/L — SIGNIFICANT CHANGE UP (ref 135–145)
SPECIMEN SOURCE: SIGNIFICANT CHANGE UP
SPECIMEN SOURCE: SIGNIFICANT CHANGE UP
TEG FUNCTIONAL FIBRINOGEN: 23.1 MM — SIGNIFICANT CHANGE UP (ref 15–32)
TEG FUNCTIONAL FIBRINOGEN: 23.1 MM — SIGNIFICANT CHANGE UP (ref 15–32)
TEG MAXIMUM AMPLITUDE: 59.2 MM — SIGNIFICANT CHANGE UP (ref 52–69)
TEG MAXIMUM AMPLITUDE: 59.2 MM — SIGNIFICANT CHANGE UP (ref 52–69)
TEG REACTION TIME: 9.4 MIN — HIGH (ref 4.6–9.1)
TEG REACTION TIME: 9.4 MIN — HIGH (ref 4.6–9.1)
WBC # BLD: 11.34 K/UL — HIGH (ref 3.8–10.5)
WBC # BLD: 11.34 K/UL — HIGH (ref 3.8–10.5)
WBC # FLD AUTO: 11.34 K/UL — HIGH (ref 3.8–10.5)
WBC # FLD AUTO: 11.34 K/UL — HIGH (ref 3.8–10.5)

## 2023-12-25 PROCEDURE — 33945 TRANSPLANTATION OF HEART: CPT | Mod: 62

## 2023-12-25 PROCEDURE — 88309 TISSUE EXAM BY PATHOLOGIST: CPT | Mod: 26

## 2023-12-25 PROCEDURE — 99292 CRITICAL CARE ADDL 30 MIN: CPT

## 2023-12-25 PROCEDURE — 71045 X-RAY EXAM CHEST 1 VIEW: CPT | Mod: 26

## 2023-12-25 PROCEDURE — 99233 SBSQ HOSP IP/OBS HIGH 50: CPT

## 2023-12-25 PROCEDURE — 88311 DECALCIFY TISSUE: CPT | Mod: 26

## 2023-12-25 PROCEDURE — 99291 CRITICAL CARE FIRST HOUR: CPT

## 2023-12-25 PROCEDURE — 99291 CRITICAL CARE FIRST HOUR: CPT | Mod: GC

## 2023-12-25 RX ORDER — DEXMEDETOMIDINE HYDROCHLORIDE IN 0.9% SODIUM CHLORIDE 4 UG/ML
0.5 INJECTION INTRAVENOUS
Qty: 200 | Refills: 0 | Status: DISCONTINUED | OUTPATIENT
Start: 2023-12-25 | End: 2023-12-27

## 2023-12-25 RX ORDER — HYDROMORPHONE HYDROCHLORIDE 2 MG/ML
0.5 INJECTION INTRAMUSCULAR; INTRAVENOUS; SUBCUTANEOUS ONCE
Refills: 0 | Status: DISCONTINUED | OUTPATIENT
Start: 2023-12-25 | End: 2023-12-25

## 2023-12-25 RX ORDER — ALBUMIN HUMAN 25 %
250 VIAL (ML) INTRAVENOUS ONCE
Refills: 0 | Status: COMPLETED | OUTPATIENT
Start: 2023-12-25 | End: 2023-12-25

## 2023-12-25 RX ORDER — DAPTOMYCIN 500 MG/10ML
500 INJECTION, POWDER, LYOPHILIZED, FOR SOLUTION INTRAVENOUS
Refills: 0 | Status: COMPLETED | OUTPATIENT
Start: 2023-12-25 | End: 2023-12-26

## 2023-12-25 RX ORDER — MYCOPHENOLATE MOFETIL 250 MG/1
1000 CAPSULE ORAL
Refills: 0 | Status: DISCONTINUED | OUTPATIENT
Start: 2023-12-25 | End: 2023-12-28

## 2023-12-25 RX ORDER — BASILIXIMAB 20 MG/5ML
20 INJECTION, POWDER, FOR SOLUTION INTRAVENOUS ONCE
Refills: 0 | Status: COMPLETED | OUTPATIENT
Start: 2023-12-29 | End: 2023-12-29

## 2023-12-25 RX ORDER — POTASSIUM CHLORIDE 20 MEQ
10 PACKET (EA) ORAL ONCE
Refills: 0 | Status: COMPLETED | OUTPATIENT
Start: 2023-12-25 | End: 2023-12-25

## 2023-12-25 RX ORDER — ALBUMIN HUMAN 25 %
100 VIAL (ML) INTRAVENOUS ONCE
Refills: 0 | Status: COMPLETED | OUTPATIENT
Start: 2023-12-25 | End: 2023-12-25

## 2023-12-25 RX ORDER — GABAPENTIN 400 MG/1
100 CAPSULE ORAL EVERY 8 HOURS
Refills: 0 | Status: ACTIVE | OUTPATIENT
Start: 2023-12-25 | End: 2024-11-22

## 2023-12-25 RX ORDER — CHLORHEXIDINE GLUCONATE 213 G/1000ML
15 SOLUTION TOPICAL EVERY 12 HOURS
Refills: 0 | Status: DISCONTINUED | OUTPATIENT
Start: 2023-12-25 | End: 2023-12-26

## 2023-12-25 RX ORDER — CEFEPIME 1 G/1
1000 INJECTION, POWDER, FOR SOLUTION INTRAMUSCULAR; INTRAVENOUS EVERY 8 HOURS
Refills: 0 | Status: COMPLETED | OUTPATIENT
Start: 2023-12-25 | End: 2023-12-28

## 2023-12-25 RX ORDER — BASILIXIMAB 20 MG/5ML
20 INJECTION, POWDER, FOR SOLUTION INTRAVENOUS ONCE
Refills: 0 | Status: COMPLETED | OUTPATIENT
Start: 2023-12-25 | End: 2023-12-25

## 2023-12-25 RX ORDER — EPINEPHRINE 0.3 MG/.3ML
0.02 INJECTION INTRAMUSCULAR; SUBCUTANEOUS
Qty: 4 | Refills: 0 | Status: DISCONTINUED | OUTPATIENT
Start: 2023-12-25 | End: 2023-12-26

## 2023-12-25 RX ORDER — PROPOFOL 10 MG/ML
50 INJECTION, EMULSION INTRAVENOUS
Qty: 1000 | Refills: 0 | Status: DISCONTINUED | OUTPATIENT
Start: 2023-12-25 | End: 2023-12-26

## 2023-12-25 RX ORDER — CALCIUM GLUCONATE 100 MG/ML
1 VIAL (ML) INTRAVENOUS ONCE
Refills: 0 | Status: COMPLETED | OUTPATIENT
Start: 2023-12-25 | End: 2023-12-25

## 2023-12-25 RX ORDER — MILRINONE LACTATE 1 MG/ML
0.12 INJECTION, SOLUTION INTRAVENOUS
Qty: 20 | Refills: 0 | Status: DISCONTINUED | OUTPATIENT
Start: 2023-12-25 | End: 2023-12-29

## 2023-12-25 RX ORDER — ACETAMINOPHEN 500 MG
1000 TABLET ORAL ONCE
Refills: 0 | Status: COMPLETED | OUTPATIENT
Start: 2023-12-25 | End: 2023-12-25

## 2023-12-25 RX ORDER — NICARDIPINE HYDROCHLORIDE 30 MG/1
3 CAPSULE, EXTENDED RELEASE ORAL
Qty: 40 | Refills: 0 | Status: DISCONTINUED | OUTPATIENT
Start: 2023-12-25 | End: 2023-12-31

## 2023-12-25 RX ADMIN — METHOCARBAMOL 500 MILLIGRAM(S): 500 TABLET, FILM COATED ORAL at 14:04

## 2023-12-25 RX ADMIN — GABAPENTIN 100 MILLIGRAM(S): 400 CAPSULE ORAL at 21:03

## 2023-12-25 RX ADMIN — Medication 125 MILLILITER(S): at 13:42

## 2023-12-25 RX ADMIN — NICARDIPINE HYDROCHLORIDE 15 MG/HR: 30 CAPSULE, EXTENDED RELEASE ORAL at 21:05

## 2023-12-25 RX ADMIN — Medication 125 MILLIGRAM(S): at 13:43

## 2023-12-25 RX ADMIN — INSULIN HUMAN 3 UNIT(S)/HR: 100 INJECTION, SOLUTION SUBCUTANEOUS at 13:44

## 2023-12-25 RX ADMIN — Medication 125 MILLIGRAM(S): at 21:04

## 2023-12-25 RX ADMIN — Medication 125 MILLIGRAM(S): at 18:17

## 2023-12-25 RX ADMIN — TRAMADOL HYDROCHLORIDE 25 MILLIGRAM(S): 50 TABLET ORAL at 21:03

## 2023-12-25 RX ADMIN — SENNA PLUS 2 TABLET(S): 8.6 TABLET ORAL at 21:03

## 2023-12-25 RX ADMIN — Medication 50 MILLIEQUIVALENT(S): at 17:34

## 2023-12-25 RX ADMIN — CEFEPIME 100 MILLIGRAM(S): 1 INJECTION, POWDER, FOR SOLUTION INTRAMUSCULAR; INTRAVENOUS at 23:43

## 2023-12-25 RX ADMIN — GABAPENTIN 100 MILLIGRAM(S): 400 CAPSULE ORAL at 13:44

## 2023-12-25 RX ADMIN — CEFEPIME 100 MILLIGRAM(S): 1 INJECTION, POWDER, FOR SOLUTION INTRAMUSCULAR; INTRAVENOUS at 15:40

## 2023-12-25 RX ADMIN — EPINEPHRINE 7.42 MICROGRAM(S)/KG/MIN: 0.3 INJECTION INTRAMUSCULAR; SUBCUTANEOUS at 13:44

## 2023-12-25 RX ADMIN — Medication 100 GRAM(S): at 09:30

## 2023-12-25 RX ADMIN — NICARDIPINE HYDROCHLORIDE 15 MG/HR: 30 CAPSULE, EXTENDED RELEASE ORAL at 18:18

## 2023-12-25 RX ADMIN — Medication 50 MILLILITER(S): at 11:15

## 2023-12-25 RX ADMIN — BASILIXIMAB 100 MILLIGRAM(S): 20 INJECTION, POWDER, FOR SOLUTION INTRAVENOUS at 18:17

## 2023-12-25 RX ADMIN — Medication 1000 MILLIGRAM(S): at 17:30

## 2023-12-25 RX ADMIN — DEXMEDETOMIDINE HYDROCHLORIDE IN 0.9% SODIUM CHLORIDE 12.4 MICROGRAM(S)/KG/HR: 4 INJECTION INTRAVENOUS at 21:05

## 2023-12-25 RX ADMIN — Medication 50 MILLIEQUIVALENT(S): at 11:00

## 2023-12-25 RX ADMIN — CHLORHEXIDINE GLUCONATE 15 MILLILITER(S): 213 SOLUTION TOPICAL at 18:18

## 2023-12-25 RX ADMIN — PANTOPRAZOLE SODIUM 40 MILLIGRAM(S): 20 TABLET, DELAYED RELEASE ORAL at 14:04

## 2023-12-25 RX ADMIN — SIMETHICONE 160 MILLIGRAM(S): 80 TABLET, CHEWABLE ORAL at 21:04

## 2023-12-25 RX ADMIN — METHOCARBAMOL 500 MILLIGRAM(S): 500 TABLET, FILM COATED ORAL at 21:03

## 2023-12-25 RX ADMIN — INSULIN HUMAN 3 UNIT(S)/HR: 100 INJECTION, SOLUTION SUBCUTANEOUS at 21:05

## 2023-12-25 RX ADMIN — MYCOPHENOLATE MOFETIL 83.34 MILLIGRAM(S): 250 CAPSULE ORAL at 20:20

## 2023-12-25 RX ADMIN — HYDROMORPHONE HYDROCHLORIDE 0.5 MILLIGRAM(S): 2 INJECTION INTRAMUSCULAR; INTRAVENOUS; SUBCUTANEOUS at 15:30

## 2023-12-25 RX ADMIN — PROPOFOL 29.7 MICROGRAM(S)/KG/MIN: 10 INJECTION, EMULSION INTRAVENOUS at 13:43

## 2023-12-25 RX ADMIN — Medication 10 MILLIGRAM(S): at 13:43

## 2023-12-25 RX ADMIN — DAPTOMYCIN 120 MILLIGRAM(S): 500 INJECTION, POWDER, LYOPHILIZED, FOR SOLUTION INTRAVENOUS at 21:03

## 2023-12-25 RX ADMIN — PROPOFOL 29.7 MICROGRAM(S)/KG/MIN: 10 INJECTION, EMULSION INTRAVENOUS at 21:06

## 2023-12-25 RX ADMIN — MILRINONE LACTATE 7.42 MICROGRAM(S)/KG/MIN: 1 INJECTION, SOLUTION INTRAVENOUS at 13:44

## 2023-12-25 RX ADMIN — MUPIROCIN 1 APPLICATION(S): 20 OINTMENT TOPICAL at 18:18

## 2023-12-25 RX ADMIN — MILRINONE LACTATE 7.42 MICROGRAM(S)/KG/MIN: 1 INJECTION, SOLUTION INTRAVENOUS at 18:57

## 2023-12-25 RX ADMIN — Medication 400 MILLIGRAM(S): at 17:15

## 2023-12-25 RX ADMIN — Medication 50 MILLIEQUIVALENT(S): at 11:30

## 2023-12-25 RX ADMIN — TRAMADOL HYDROCHLORIDE 25 MILLIGRAM(S): 50 TABLET ORAL at 14:13

## 2023-12-25 RX ADMIN — EPINEPHRINE 7.42 MICROGRAM(S)/KG/MIN: 0.3 INJECTION INTRAMUSCULAR; SUBCUTANEOUS at 21:04

## 2023-12-25 RX ADMIN — Medication 50 MILLIEQUIVALENT(S): at 16:00

## 2023-12-25 RX ADMIN — Medication 10 MILLIGRAM(S): at 21:04

## 2023-12-25 RX ADMIN — TRAMADOL HYDROCHLORIDE 25 MILLIGRAM(S): 50 TABLET ORAL at 13:43

## 2023-12-25 RX ADMIN — HYDROMORPHONE HYDROCHLORIDE 0.5 MILLIGRAM(S): 2 INJECTION INTRAMUSCULAR; INTRAVENOUS; SUBCUTANEOUS at 15:15

## 2023-12-25 RX ADMIN — Medication 50 MILLIEQUIVALENT(S): at 16:34

## 2023-12-25 RX ADMIN — MILRINONE LACTATE 7.42 MICROGRAM(S)/KG/MIN: 1 INJECTION, SOLUTION INTRAVENOUS at 21:05

## 2023-12-25 NOTE — PROGRESS NOTE ADULT - CRITICAL CARE ATTENDING COMMENT
Patient was seen and examined with the fellow.  I agree with the above except for the following:    s/p OHT.  Total ischemic time 253min.  Received 2 plts, 10 cryo.  Came out this AM on IABP 1:1, milrinone 0.25mcg, epi 0.02, and Sabrina.  Remains intubated.  Plan to extubate later today.  Wean Sabrina first.  Then can wean epi.  IABP to be removed tomorrow.    IS: will see where kidney function stabilizes at today.  First dose of tac 1mg tonight if stable.  Simulect to be given today and POD 4.  IV steroids and cellcept as above.

## 2023-12-25 NOTE — PROGRESS NOTE ADULT - SUBJECTIVE AND OBJECTIVE BOX
Patient seen and examined at the bedside.    Remained critically ill on continuous ICU monitoring.      Brief Summary:  59yo M w/ hx HTN, CAD w/ 1 stent in 2009, ICH (2008) now with OHT 12/25/2023      24 Hour events:  - Taken to the OR for Heart transplant  - Drips:  - Echo:   - Blood Products:       OBJECTIVE:  Vital Signs Last 24 Hrs  T(C): 36.7 (25 Dec 2023 00:00), Max: 36.9 (24 Dec 2023 12:00)  T(F): 98 (25 Dec 2023 00:00), Max: 98.4 (24 Dec 2023 12:00)  HR: 126 (25 Dec 2023 01:00) (69 - 126)  BP: 94/48 (24 Dec 2023 15:23) (94/48 - 94/48)  BP(mean): 63 (24 Dec 2023 15:23) (63 - 63)  RR: 37 (25 Dec 2023 01:00) (13 - 37)  SpO2: 97% (25 Dec 2023 01:00) (93% - 98%)    Parameters below as of 25 Dec 2023 01:00  Patient On (Oxygen Delivery Method): room air                    Physical Exam:   General: Intubated and Sedated            Neurology: intact  ENT: trachea midline  Respiratory: on Ventilator       CV: S1S2, no murmurs  Abdominal: Soft, NT  : Llamas    Extremities: warm, well perfused, moving all extremities   Skin: No Rashes, Hematoma, Ecchymosis         -------------------------------------------------------------------------------------------------------------------------------    Labs:                        12.5   8.27  )-----------( 203      ( 24 Dec 2023 17:10 )             37.5     12-24    136  |  106  |  28<H>  ----------------------------<  129<H>  3.9   |  18<L>  |  1.20    Ca    9.9      24 Dec 2023 17:10    TPro  6.6  /  Alb  3.7  /  TBili  0.3  /  DBili  x   /  AST  25  /  ALT  55<H>  /  AlkPhos  60  12-24    LIVER FUNCTIONS - ( 24 Dec 2023 17:10 )  Alb: 3.7 g/dL / Pro: 6.6 g/dL / ALK PHOS: 60 U/L / ALT: 55 U/L / AST: 25 U/L / GGT: x           PT/INR - ( 24 Dec 2023 17:10 )   PT: 13.0 sec;   INR: 1.25 ratio         PTT - ( 24 Dec 2023 17:10 )  PTT:68.8 sec  ABG - ( 25 Dec 2023 07:29 )  pH, Arterial: 7.39  pH, Blood: x     /  pCO2: 41    /  pO2: 235   / HCO3: 25    / Base Excess: -0.2  /  SaO2: 100.0             ------------------------------------------------------------------------------------------------------------------------------  Assessment:  59yo M w/ hx HTN, CAD w/ 1 stent in 2009, ICH (2008) now with OHT 12/25/2023    Hemodynamic instability  Hypovolemia  Post op respiratory insufficiency  Acute blood loss anemia  Stress hyperglycemia        Plan:   ***Neuro***  - Tay and Lyrica for pain  - Continue Trazadone    ***Cardiovascular***  - Invasive hemodynamic monitoring, assess perfusion indices   - At risk for hemodynamic instability and cardiac arrhythmias.  - IVF for perioperative hypovolemia.  - IABP @ 1:1       ***Pulmonary***.  - Wean ventilator as tolerated.   - Deep breathing and coughing exercises.    ***GI***  - NPO for now.    - Protonix for stress ulcer prophylaxis   - Bowel regimen.  - Reglan for gut motility     ***Renal***  - Llamas catheter for strict I/O measurements.    - Replete electrolytes as indicated.    ***ID***  - PO Vanco for c.diff prophylaxis       ***Endocrine***  - Stress Hyperglycemia  - Insulin as needed to maintain euglycemia.      ***Hematology***  - Acute blood loss anemia.  - No current transfusion indication    - At risk for Bleeding            Patient requires continuous monitoring with bedside rhythm monitoring, pulse oximetry monitoring, and continuous central venous and arterial pressure monitoring; and intermittent blood gas analysis. Care plan discussed with the ICU care team.   Patient remained critical, at risk for life threatening decompensation.    I have spent 30 minutes providing critical care management to this patient.    By signing my name below, I, Robert Ady, attest that this documentation has been prepared under the direction and in the presence of Tasha Shoemaker DO  Electronically signed: Robert Garsia, 12-25-23 @ 08:52    I, Tasha Shoemaker DO, personally performed the services described in this documentation. all medical record entries made by the scribe were at my direction and in my presence. I have reviewed the chart and agree that the record reflects my personal performance and is accurate and complete  Electronically signed: Tasha Shoemaker DO, Patient seen and examined at the bedside.    Remained critically ill on continuous ICU monitoring.      Brief Summary:  57yo M w/ hx HTN, CAD w/ 1 stent in 2009, ICH (2008) now with OHT with IABP 12/25/2023      24 Hour events:  - Taken to the OR for Heart transplant with IABP  - Drips: Propofol 50, Milrinone 0.25, Epi 0.02  - Echo: EF 45-50%, normal Biventricular function  - Blood Products: 2 cryo, 2 plts   - Wean IABP if able      OBJECTIVE:  Vital Signs Last 24 Hrs  T(C): 36.7 (25 Dec 2023 00:00), Max: 36.9 (24 Dec 2023 12:00)  T(F): 98 (25 Dec 2023 00:00), Max: 98.4 (24 Dec 2023 12:00)  HR: 126 (25 Dec 2023 01:00) (69 - 126)  BP: 94/48 (24 Dec 2023 15:23) (94/48 - 94/48)  BP(mean): 63 (24 Dec 2023 15:23) (63 - 63)  RR: 37 (25 Dec 2023 01:00) (13 - 37)  SpO2: 97% (25 Dec 2023 01:00) (93% - 98%)    Parameters below as of 25 Dec 2023 01:00  Patient On (Oxygen Delivery Method): Ventilator                     Physical Exam:   General: Intubated and Sedated            Neurology: intact  ENT: trachea midline  Respiratory: on Ventilator       CV: S1S2, no murmurs  Abdominal: Soft, NT  : Llamas    Extremities: warm, well perfused, moving all extremities   Skin: No Rashes, Hematoma, Ecchymosis         -------------------------------------------------------------------------------------------------------------------------------    Labs:                        12.5   8.27  )-----------( 203      ( 24 Dec 2023 17:10 )             37.5     12-24    136  |  106  |  28<H>  ----------------------------<  129<H>  3.9   |  18<L>  |  1.20    Ca    9.9      24 Dec 2023 17:10    TPro  6.6  /  Alb  3.7  /  TBili  0.3  /  DBili  x   /  AST  25  /  ALT  55<H>  /  AlkPhos  60  12-24    LIVER FUNCTIONS - ( 24 Dec 2023 17:10 )  Alb: 3.7 g/dL / Pro: 6.6 g/dL / ALK PHOS: 60 U/L / ALT: 55 U/L / AST: 25 U/L / GGT: x           PT/INR - ( 24 Dec 2023 17:10 )   PT: 13.0 sec;   INR: 1.25 ratio         PTT - ( 24 Dec 2023 17:10 )  PTT:68.8 sec  ABG - ( 25 Dec 2023 07:29 )  pH, Arterial: 7.39  pH, Blood: x     /  pCO2: 41    /  pO2: 235   / HCO3: 25    / Base Excess: -0.2  /  SaO2: 100.0             ------------------------------------------------------------------------------------------------------------------------------  Assessment:  57yo M w/ hx HTN, CAD w/ 1 stent in 2009, ICH (2008) now with OHT 12/25/2023    Hemodynamic instability  Hypovolemia  Post op respiratory insufficiency  Acute blood loss anemia  Stress hyperglycemia        Plan:   ***Neuro***  - Sedated with Propofol  - Tay and Lyrica for pain  - Continue Trazadone    ***Cardiovascular***  - Invasive hemodynamic monitoring, assess perfusion indices   - At risk for hemodynamic instability and cardiac arrhythmias.  - IVF for perioperative hypovolemia.  - IABP @ 1:1  - Continue Milrinone and Epinephrine         ***Pulmonary***.  - Wean ventilator as tolerated.   - Deep breathing and coughing exercises.  - Nitric @ 20 ppm    ***GI***  - NPO for now.    - Protonix for stress ulcer prophylaxis   - Bowel regimen.  - Reglan for gut motility     ***Renal***  - Llamas catheter for strict I/O measurements.    - Replete electrolytes as indicated.    ***ID***  - PO Vanco for c.diff prophylaxis       ***Endocrine***  - Stress Hyperglycemia  - Insulin as needed to maintain euglycemia.      ***Hematology***  - Acute blood loss anemia.  - No current transfusion indication    - At risk for Bleeding            Patient requires continuous monitoring with bedside rhythm monitoring, pulse oximetry monitoring, and continuous central venous and arterial pressure monitoring; and intermittent blood gas analysis. Care plan discussed with the ICU care team.   Patient remained critical, at risk for life threatening decompensation.    I have spent 30 minutes providing critical care management to this patient.    By signing my name below, I, Robert Garsia, attest that this documentation has been prepared under the direction and in the presence of Tasha Shoemaker DO  Electronically signed: Robert Garsia, 12-25-23 @ 08:52    I, Tasha Shoemaker DO, personally performed the services described in this documentation. all medical record entries made by the scribe were at my direction and in my presence. I have reviewed the chart and agree that the record reflects my personal performance and is accurate and complete  Electronically signed: Tasha Shoemaker DO, Patient seen and examined at the bedside.    Remained critically ill on continuous ICU monitoring.      Brief Summary:  57yo M w/ hx HTN, CAD w/ 1 stent in 2009, ICH (2008) now with OHT with IABP 12/25/2023      24 Hour events:  - Taken to the OR for Heart transplant with IABP  - Drips: Propofol 50, Milrinone 0.25, Epi 0.02  - Echo: EF 45-50%, normal Biventricular function  - Blood Products: 2 cryo, 2 plts   - Wean IABP if able  - Cefepime started for perioperative transplant coverage      OBJECTIVE:  Vital Signs Last 24 Hrs  T(C): 36.7 (25 Dec 2023 00:00), Max: 36.9 (24 Dec 2023 12:00)  T(F): 98 (25 Dec 2023 00:00), Max: 98.4 (24 Dec 2023 12:00)  HR: 126 (25 Dec 2023 01:00) (69 - 126)  BP: 94/48 (24 Dec 2023 15:23) (94/48 - 94/48)  BP(mean): 63 (24 Dec 2023 15:23) (63 - 63)  RR: 37 (25 Dec 2023 01:00) (13 - 37)  SpO2: 97% (25 Dec 2023 01:00) (93% - 98%)    Parameters below as of 25 Dec 2023 01:00  Patient On (Oxygen Delivery Method): Ventilator                     Physical Exam:   General: Intubated and Sedated            Neurology: intact  ENT: trachea midline  Respiratory: on Ventilator       CV: S1S2, no murmurs  Abdominal: Soft, NT  : Llamas    Extremities: warm, well perfused, moving all extremities   Skin: No Rashes, Hematoma, Ecchymosis         -------------------------------------------------------------------------------------------------------------------------------    Labs:                        12.5   8.27  )-----------( 203      ( 24 Dec 2023 17:10 )             37.5     12-24    136  |  106  |  28<H>  ----------------------------<  129<H>  3.9   |  18<L>  |  1.20    Ca    9.9      24 Dec 2023 17:10    TPro  6.6  /  Alb  3.7  /  TBili  0.3  /  DBili  x   /  AST  25  /  ALT  55<H>  /  AlkPhos  60  12-24    LIVER FUNCTIONS - ( 24 Dec 2023 17:10 )  Alb: 3.7 g/dL / Pro: 6.6 g/dL / ALK PHOS: 60 U/L / ALT: 55 U/L / AST: 25 U/L / GGT: x           PT/INR - ( 24 Dec 2023 17:10 )   PT: 13.0 sec;   INR: 1.25 ratio         PTT - ( 24 Dec 2023 17:10 )  PTT:68.8 sec  ABG - ( 25 Dec 2023 07:29 )  pH, Arterial: 7.39  pH, Blood: x     /  pCO2: 41    /  pO2: 235   / HCO3: 25    / Base Excess: -0.2  /  SaO2: 100.0             ------------------------------------------------------------------------------------------------------------------------------  Assessment:  57yo M w/ hx HTN, CAD w/ 1 stent in 2009, ICH (2008) now with OHT 12/25/2023    Hemodynamic instability  Hypovolemia  Post op respiratory insufficiency  Acute blood loss anemia  Stress hyperglycemia        Plan:   ***Neuro***  - Sedated with Propofol  - Tay and Lyrica for pain  - Continue Trazadone    ***Cardiovascular***  - Invasive hemodynamic monitoring, assess perfusion indices   - At risk for hemodynamic instability and cardiac arrhythmias.  - IVF for perioperative hypovolemia.  - IABP @ 1:1  - Continue Milrinone and Epinephrine         ***Pulmonary***.  - Wean ventilator as tolerated.   - Deep breathing and coughing exercises.  - Nitric @ 20 ppm    ***GI***  - NPO for now.    - Protonix for stress ulcer prophylaxis   - Bowel regimen.  - Reglan for gut motility     ***Renal***  - Llamas catheter for strict I/O measurements.    - Replete electrolytes as indicated.    ***ID***  - Cefepime started for perioperative transplant coverage  - PO Vanco for c.diff prophylaxis       ***Endocrine***  - Stress Hyperglycemia  - Insulin as needed to maintain euglycemia.      ***Hematology***  - Acute blood loss anemia.  - No current transfusion indication    - At risk for Bleeding            Patient requires continuous monitoring with bedside rhythm monitoring, pulse oximetry monitoring, and continuous central venous and arterial pressure monitoring; and intermittent blood gas analysis. Care plan discussed with the ICU care team.   Patient remained critical, at risk for life threatening decompensation.    I have spent 30 minutes providing critical care management to this patient.    By signing my name below, I, Robert Ady, attest that this documentation has been prepared under the direction and in the presence of Tasha Shoemaker DO  Electronically signed: Robert Ady, 12-25-23 @ 08:52    I, Tasha Shoemaker DO, personally performed the services described in this documentation. all medical record entries made by the scribe were at my direction and in my presence. I have reviewed the chart and agree that the record reflects my personal performance and is accurate and complete  Electronically signed: Tasha Shoemaker DO, Patient seen and examined at the bedside.    Remained critically ill on continuous ICU monitoring.      Brief Summary:  59yo M w/ hx HTN, CAD w/ 1 stent in 2009, ICH (2008) now with OHT with IABP 12/25/2023      24 Hour events:  - Taken to the OR for Heart transplant with IABP  - Drips: Propofol 50, Milrinone 0.25, Epi 0.02  - Echo: EF 45-50%, normal Biventricular function  - Blood Products: 2 cryo, 2 plts   - Wean IABP if able  - Cefepime started for perioperative transplant coverage      OBJECTIVE:  Vital Signs Last 24 Hrs  T(C): 36.7 (25 Dec 2023 00:00), Max: 36.9 (24 Dec 2023 12:00)  T(F): 98 (25 Dec 2023 00:00), Max: 98.4 (24 Dec 2023 12:00)  HR: 126 (25 Dec 2023 01:00) (69 - 126)  BP: 94/48 (24 Dec 2023 15:23) (94/48 - 94/48)  BP(mean): 63 (24 Dec 2023 15:23) (63 - 63)  RR: 37 (25 Dec 2023 01:00) (13 - 37)  SpO2: 97% (25 Dec 2023 01:00) (93% - 98%)    Parameters below as of 25 Dec 2023 01:00  Patient On (Oxygen Delivery Method): Ventilator                     Physical Exam:   General: Intubated and Sedated            Neurology: intact  ENT: trachea midline  Respiratory: on Ventilator       CV: S1S2, no murmurs  Abdominal: Soft, NT  : Llamas    Extremities: warm, well perfused, moving all extremities   Skin: No Rashes, Hematoma, Ecchymosis         -------------------------------------------------------------------------------------------------------------------------------    Labs:                        12.5   8.27  )-----------( 203      ( 24 Dec 2023 17:10 )             37.5     12-24    136  |  106  |  28<H>  ----------------------------<  129<H>  3.9   |  18<L>  |  1.20    Ca    9.9      24 Dec 2023 17:10    TPro  6.6  /  Alb  3.7  /  TBili  0.3  /  DBili  x   /  AST  25  /  ALT  55<H>  /  AlkPhos  60  12-24    LIVER FUNCTIONS - ( 24 Dec 2023 17:10 )  Alb: 3.7 g/dL / Pro: 6.6 g/dL / ALK PHOS: 60 U/L / ALT: 55 U/L / AST: 25 U/L / GGT: x           PT/INR - ( 24 Dec 2023 17:10 )   PT: 13.0 sec;   INR: 1.25 ratio         PTT - ( 24 Dec 2023 17:10 )  PTT:68.8 sec  ABG - ( 25 Dec 2023 07:29 )  pH, Arterial: 7.39  pH, Blood: x     /  pCO2: 41    /  pO2: 235   / HCO3: 25    / Base Excess: -0.2  /  SaO2: 100.0             ------------------------------------------------------------------------------------------------------------------------------  Assessment:  59yo M w/ hx HTN, CAD w/ 1 stent in 2009, ICH (2008) now with OHT 12/25/2023    Hemodynamic instability  Hypovolemia  Post op respiratory insufficiency  Acute blood loss anemia  Stress hyperglycemia        Plan:   ***Neuro***  - Sedated with Propofol  - Tay and Lyrica for pain  - Continue Trazadone    ***Cardiovascular***  - Invasive hemodynamic monitoring, assess perfusion indices   - At risk for hemodynamic instability and cardiac arrhythmias.  - IVF for perioperative hypovolemia.  - IABP @ 1:1  - Continue Milrinone and Epinephrine         ***Pulmonary***.  - Wean ventilator as tolerated.   - Deep breathing and coughing exercises.  - Nitric @ 20 ppm    ***GI***  - NPO for now.    - Protonix for stress ulcer prophylaxis   - Bowel regimen.  - Reglan for gut motility     ***Renal***  - Llamas catheter for strict I/O measurements.    - Replete electrolytes as indicated.    ***ID***  - Cefepime started for perioperative transplant coverage  - PO Vanco for c.diff prophylaxis       ***Endocrine***  - Stress Hyperglycemia  - Insulin as needed to maintain euglycemia.      ***Hematology***  - Acute blood loss anemia.  - No current transfusion indication    - At risk for Bleeding            Patient requires continuous monitoring with bedside rhythm monitoring, pulse oximetry monitoring, and continuous central venous and arterial pressure monitoring; and intermittent blood gas analysis. Care plan discussed with the ICU care team.   Patient remained critical, at risk for life threatening decompensation.    I have spent 30 minutes providing critical care management to this patient.    By signing my name below, I, Robert Ady, attest that this documentation has been prepared under the direction and in the presence of Tasha Shoemaker DO  Electronically signed: Robert Ady, 12-25-23 @ 08:52    I, Tasha Shoemaker DO, personally performed the services described in this documentation. all medical record entries made by the scribe were at my direction and in my presence. I have reviewed the chart and agree that the record reflects my personal performance and is accurate and complete  Electronically signed: Tasha Shoemaker DO, Patient seen and examined at the bedside.    Remained critically ill on continuous ICU monitoring.      Brief Summary:  59yo M w/ PMHx HTN, CAD s/p stent (2009), ICH (2008), ICM now s/p OHT with IABP on 12/25/2023.      24 Hour events:  - Taken to the OR for OHT, IABP  - Drips: Milrinone 0.25, Epi 0.04 --> 0.02  - Echo: EF 55%, normal RV function  - Blood Products: 2 cryo, 2 plts   - Ischemic time: 253 min      OBJECTIVE:  Vital Signs Last 24 Hrs  T(C): 36.7 (25 Dec 2023 00:00), Max: 36.9 (24 Dec 2023 12:00)  T(F): 98 (25 Dec 2023 00:00), Max: 98.4 (24 Dec 2023 12:00)  HR: 126 (25 Dec 2023 01:00) (69 - 126)  BP: 94/48 (24 Dec 2023 15:23) (94/48 - 94/48)  BP(mean): 63 (24 Dec 2023 15:23) (63 - 63)  RR: 37 (25 Dec 2023 01:00) (13 - 37)  SpO2: 97% (25 Dec 2023 01:00) (93% - 98%)    Parameters below as of 25 Dec 2023 01:00  Patient On (Oxygen Delivery Method): Ventilator       Physical Exam:   General: Intubated and Sedated            Neurology: intact, follows commands off sedation  Respiratory: on Ventilator, synchronous with ventilator  CV: sinus rhythm  Abdominal: Soft, ND  : +Llamas  Extremities: warm, well perfused, moving all extremities   Skin: No Rashes, Hematoma, Ecchymosis         -------------------------------------------------------------------------------------------------------------------------------    Labs:                        12.5   8.27  )-----------( 203      ( 24 Dec 2023 17:10 )             37.5     12-24    136  |  106  |  28<H>  ----------------------------<  129<H>  3.9   |  18<L>  |  1.20    Ca    9.9      24 Dec 2023 17:10    TPro  6.6  /  Alb  3.7  /  TBili  0.3  /  DBili  x   /  AST  25  /  ALT  55<H>  /  AlkPhos  60  12-24    LIVER FUNCTIONS - ( 24 Dec 2023 17:10 )  Alb: 3.7 g/dL / Pro: 6.6 g/dL / ALK PHOS: 60 U/L / ALT: 55 U/L / AST: 25 U/L / GGT: x           PT/INR - ( 24 Dec 2023 17:10 )   PT: 13.0 sec;   INR: 1.25 ratio         PTT - ( 24 Dec 2023 17:10 )  PTT:68.8 sec  ABG - ( 25 Dec 2023 07:29 )  pH, Arterial: 7.39  pH, Blood: x     /  pCO2: 41    /  pO2: 235   / HCO3: 25    / Base Excess: -0.2  /  SaO2: 100.0             ------------------------------------------------------------------------------------------------------------------------------  Assessment:  59yo M w/ PMHx HTN, CAD s/p stent (2009), ICH (2008), ICM now s/p OHT with IABP on 12/25/2023.    S/p OHT on 12/25/2023  At risk for hemodynamic instability and cardiac arrhythmias  Post op respiratory insufficiency  Receiving inotropic medication  Acute blood loss anemia  Stress hyperglycemia        Plan:   ***Neuro***  - Sedated with Propofol.  Transition to precedex gtt. Wean sedation as tolerated.  - tylenol, gabapentin, and PRNs for pain      ***Cardiovascular***  - At risk for hemodynamic instability and cardiac arrhythmias.  - IVF for perioperative hypovolemia.  - IABP @ 1:1  - Continue Milrinone and Epinephrine for inotropic support.       ***Pulmonary***.  - Wean ventilator as tolerated.   - Deep breathing and coughing exercises.  - Wean Sabrina to 10ppm.    ***GI***  - NPO for now.    - Protonix for stress ulcer prophylaxis   - Bowel regimen.  - Reglan for gut motility     ***Renal***  - Llamas catheter for strict I/O measurements.    - Replete electrolytes as indicated.    ***ID***  - daptomycin and cefepime started for perioperative transplant coverage  - PO Vanco for c.diff prophylaxis   - Immunosuppression - received solumedrol, simulect.    ***Endocrine***  - Stress Hyperglycemia  - Insulin as needed to maintain euglycemia.      ***Hematology***  - Acute blood loss anemia.  - No current transfusion indication    - At risk for Bleeding            Patient requires continuous monitoring with bedside rhythm monitoring, pulse oximetry monitoring, and continuous central venous and arterial pressure monitoring; and intermittent blood gas analysis. Care plan discussed with the ICU care team.   Patient remained critical, at risk for life threatening decompensation.    I have spent 30 minutes providing critical care management to this patient.    By signing my name below, I, Robert Garsia, attest that this documentation has been prepared under the direction and in the presence of Tasha Shoemaker DO  Electronically signed: Robert Loerapal, 12-25-23 @ 08:52    I, Tasha Shoemaker DO, personally performed the services described in this documentation. all medical record entries made by the scribe were at my direction and in my presence. I have reviewed the chart and agree that the record reflects my personal performance and is accurate and complete  Electronically signed: Tasha Shoemaker DO, Patient seen and examined at the bedside.    Remained critically ill on continuous ICU monitoring.      Brief Summary:  57yo M w/ PMHx HTN, CAD s/p stent (2009), ICH (2008), ICM now s/p OHT with IABP on 12/25/2023.      24 Hour events:  - Taken to the OR for OHT, IABP  - Drips: Milrinone 0.25, Epi 0.04 --> 0.02  - Echo: EF 55%, normal RV function  - Blood Products: 2 cryo, 2 plts   - Ischemic time: 253 min      OBJECTIVE:  Vital Signs Last 24 Hrs  T(C): 36.7 (25 Dec 2023 00:00), Max: 36.9 (24 Dec 2023 12:00)  T(F): 98 (25 Dec 2023 00:00), Max: 98.4 (24 Dec 2023 12:00)  HR: 126 (25 Dec 2023 01:00) (69 - 126)  BP: 94/48 (24 Dec 2023 15:23) (94/48 - 94/48)  BP(mean): 63 (24 Dec 2023 15:23) (63 - 63)  RR: 37 (25 Dec 2023 01:00) (13 - 37)  SpO2: 97% (25 Dec 2023 01:00) (93% - 98%)    Parameters below as of 25 Dec 2023 01:00  Patient On (Oxygen Delivery Method): Ventilator       Physical Exam:   General: Intubated and Sedated            Neurology: intact, follows commands off sedation  Respiratory: on Ventilator, synchronous with ventilator  CV: sinus rhythm  Abdominal: Soft, ND  : +Llamas  Extremities: warm, well perfused, moving all extremities   Skin: No Rashes, Hematoma, Ecchymosis         -------------------------------------------------------------------------------------------------------------------------------    Labs:                        12.5   8.27  )-----------( 203      ( 24 Dec 2023 17:10 )             37.5     12-24    136  |  106  |  28<H>  ----------------------------<  129<H>  3.9   |  18<L>  |  1.20    Ca    9.9      24 Dec 2023 17:10    TPro  6.6  /  Alb  3.7  /  TBili  0.3  /  DBili  x   /  AST  25  /  ALT  55<H>  /  AlkPhos  60  12-24    LIVER FUNCTIONS - ( 24 Dec 2023 17:10 )  Alb: 3.7 g/dL / Pro: 6.6 g/dL / ALK PHOS: 60 U/L / ALT: 55 U/L / AST: 25 U/L / GGT: x           PT/INR - ( 24 Dec 2023 17:10 )   PT: 13.0 sec;   INR: 1.25 ratio         PTT - ( 24 Dec 2023 17:10 )  PTT:68.8 sec  ABG - ( 25 Dec 2023 07:29 )  pH, Arterial: 7.39  pH, Blood: x     /  pCO2: 41    /  pO2: 235   / HCO3: 25    / Base Excess: -0.2  /  SaO2: 100.0             ------------------------------------------------------------------------------------------------------------------------------  Assessment:  57yo M w/ PMHx HTN, CAD s/p stent (2009), ICH (2008), ICM now s/p OHT with IABP on 12/25/2023.    S/p OHT on 12/25/2023  At risk for hemodynamic instability and cardiac arrhythmias  Post op respiratory insufficiency  Receiving inotropic medication  Acute blood loss anemia  Stress hyperglycemia        Plan:   ***Neuro***  - Sedated with Propofol.  Transition to precedex gtt. Wean sedation as tolerated.  - tylenol, gabapentin, and PRNs for pain      ***Cardiovascular***  - At risk for hemodynamic instability and cardiac arrhythmias.  - IVF for perioperative hypovolemia.  - IABP @ 1:1  - Continue Milrinone and Epinephrine for inotropic support.       ***Pulmonary***.  - Wean ventilator as tolerated.   - Deep breathing and coughing exercises.  - Wean Sabrina to 10ppm.    ***GI***  - NPO for now.    - Protonix for stress ulcer prophylaxis   - Bowel regimen.  - Reglan for gut motility     ***Renal***  - Llamas catheter for strict I/O measurements.    - Replete electrolytes as indicated.    ***ID***  - daptomycin and cefepime started for perioperative transplant coverage  - PO Vanco for c.diff prophylaxis   - Immunosuppression - received solumedrol, simulect.    ***Endocrine***  - Stress Hyperglycemia  - Insulin as needed to maintain euglycemia.      ***Hematology***  - Acute blood loss anemia.  - No current transfusion indication    - At risk for Bleeding            Patient requires continuous monitoring with bedside rhythm monitoring, pulse oximetry monitoring, and continuous central venous and arterial pressure monitoring; and intermittent blood gas analysis. Care plan discussed with the ICU care team.   Patient remained critical, at risk for life threatening decompensation.    I have spent 30 minutes providing critical care management to this patient.    By signing my name below, I, Robert Garsia, attest that this documentation has been prepared under the direction and in the presence of Tasha Shoemaker DO  Electronically signed: Robert Loerapal, 12-25-23 @ 08:52    I, Tasha Shoemaker DO, personally performed the services described in this documentation. all medical record entries made by the scribe were at my direction and in my presence. I have reviewed the chart and agree that the record reflects my personal performance and is accurate and complete  Electronically signed: Tasha Shoemaker DO,

## 2023-12-25 NOTE — PROGRESS NOTE ADULT - SUBJECTIVE AND OBJECTIVE BOX
Subjective:  OHT 12/25.     Medications:  basiliximab  IVPB 20 milliGRAM(s) IV Intermittent once  cefepime   IVPB 1000 milliGRAM(s) IV Intermittent every 8 hours  chlorhexidine 0.12% Liquid 15 milliLiter(s) Oral Mucosa every 12 hours  chlorhexidine 4% Liquid 1 Application(s) Topical once  DAPTOmycin IVPB 500 milliGRAM(s) IV Intermittent <User Schedule>  dextrose 50% Injectable 50 milliLiter(s) IV Push every 15 minutes  dextrose 50% Injectable 25 milliLiter(s) IV Push every 15 minutes  EPINEPHrine    Infusion 0.02 MICROgram(s)/kG/Min IV Continuous <Continuous>  gabapentin 100 milliGRAM(s) Oral every 8 hours  insulin regular Infusion 3 Unit(s)/Hr IV Continuous <Continuous>  lidocaine   4% Patch 3 Patch Transdermal daily  methocarbamol 500 milliGRAM(s) Oral every 8 hours  methylPREDNISolone sodium succinate Injectable 125 milliGRAM(s) IV Push every 8 hours  methylPREDNISolone sodium succinate Injectable   IV Push   metoclopramide Injectable 10 milliGRAM(s) IV Push every 8 hours  milrinone Infusion 0.25 MICROgram(s)/kG/Min IV Continuous <Continuous>  mupirocin 2% Ointment 1 Application(s) Topical two times a day  mycophenolate mofetil IVPB 1000 milliGRAM(s) IV Intermittent <User Schedule>  naloxegol 25 milliGRAM(s) Oral daily  pantoprazole  Injectable 40 milliGRAM(s) IV Push daily  polyethylene glycol 3350 17 Gram(s) Oral daily  propofol Infusion 50 MICROgram(s)/kG/Min IV Continuous <Continuous>  senna 2 Tablet(s) Oral at bedtime  simethicone 160 milliGRAM(s) Chew every 8 hours  sodium chloride 0.9%. 1000 milliLiter(s) IV Continuous <Continuous>  traMADol 25 milliGRAM(s) Oral every 8 hours  vancomycin    Solution 125 milliGRAM(s) Oral every 12 hours    Vitals:  T(C): 37.1 (12-25-23 @ 08:45), Max: 37.1 (12-25-23 @ 08:45)  HR: 84 (12-25-23 @ 10:49) (69 - 126)  BP: 94/48 (12-24-23 @ 15:23) (94/48 - 94/48)  BP(mean): 63 (12-24-23 @ 15:23) (63 - 63)  ABP: 111/43 (12-25-23 @ 10:45) (78/42 - 127/54)  ABP(mean): 66 (12-25-23 @ 10:45) (63 - 91)  RR: 14 (12-25-23 @ 10:45) (11 - 37)  SpO2: 96% (12-25-23 @ 10:49) (93% - 100%)  CO: 4.6 (12-25-23 @ 11:00) (4.6 - 5.6)  CI: 2.2 (12-25-23 @ 11:00) (2.2 - 2.6)  PA: 22/9 (12-25-23 @ 10:45) (1/1 - 29/9)  PA(mean): 16 (12-25-23 @ 10:45) (1 - 21)  SVR: 970 (12-25-23 @ 09:00) (970 - 970)  Weight (kg): 98.9 (12-24 @ 15:23)    I&O's Summary    24 Dec 2023 07:01  -  25 Dec 2023 07:00  --------------------------------------------------------  IN: 432 mL / OUT: 820 mL / NET: -388 mL    25 Dec 2023 07:01  -  25 Dec 2023 11:31  --------------------------------------------------------  IN: 426.5 mL / OUT: 845 mL / NET: -418.5 mL        Physical Exam:  Appearance: No Acute Distress, intubated, sedated  Cardiovascular: RRR, Normal S1 S2, No murmurs/rubs/gallops  Respiratory: Clear to auscultation bilaterally  Gastrointestinal: Soft, Non-tender, non-distended	  Neurologic: Sedated  Extremities: No LE edema, warm and well perfused    Mode: AC/ CMV (Assist Control/ Continuous Mandatory Ventilation), RR (machine): 12, FiO2: 40, PEEP: 8, ITime: 1, MAP: 12, PC: 14, PIP: 22    Labs:                        10.9   11.34 )-----------( 240      ( 25 Dec 2023 09:11 )             32.1     12-25    141  |  103  |  28<H>  ----------------------------<  247<H>  4.4   |  24  |  1.29    Ca    9.1      25 Dec 2023 09:12  Phos  4.0     12-25  Mg     2.3     12-25    TPro  6.2  /  Alb  3.7  /  TBili  1.0  /  DBili  0.4<H>  /  AST  87<H>  /  ALT  42  /  AlkPhos  55  12-25    Oxygen Saturation, Mixed: 82.2 (12-25 @ 09:18)

## 2023-12-25 NOTE — PROGRESS NOTE ADULT - SUBJECTIVE AND OBJECTIVE BOX
INFECTIOUS DISEASES FOLLOW UP-- Courtney Peters  [phone]    This is a follow up note for this  58yMale with  Non-ST elevation myocardial infarction (NSTEMI)    s/p OHTx on 12/25        ROS:  CONSTITUTIONAL:  No fever, remains sedated, intubated  CARDIOVASCULAR:  No chest pain or palpitations  RESPIRATORY:  No dyspnea  GASTROINTESTINAL:  No nausea, vomiting, diarrhea, or abdominal pain  GENITOURINARY:  No dysuria  NEUROLOGIC:  No headache,     Allergies    penicillins (Unknown)    Intolerances        ANTIBIOTICS/RELEVANT:  antimicrobials  cefepime   IVPB 1000 milliGRAM(s) IV Intermittent every 8 hours  DAPTOmycin IVPB 500 milliGRAM(s) IV Intermittent <User Schedule>  vancomycin    Solution 125 milliGRAM(s) Oral every 12 hours    immunologic:  basiliximab  IVPB 20 milliGRAM(s) IV Intermittent once  mycophenolate mofetil IVPB 1000 milliGRAM(s) IV Intermittent <User Schedule>    OTHER:  chlorhexidine 0.12% Liquid 15 milliLiter(s) Oral Mucosa every 12 hours  chlorhexidine 4% Liquid 1 Application(s) Topical once  dextrose 50% Injectable 50 milliLiter(s) IV Push every 15 minutes  dextrose 50% Injectable 25 milliLiter(s) IV Push every 15 minutes  EPINEPHrine    Infusion 0.02 MICROgram(s)/kG/Min IV Continuous <Continuous>  gabapentin 100 milliGRAM(s) Oral every 8 hours  insulin regular Infusion 3 Unit(s)/Hr IV Continuous <Continuous>  lidocaine   4% Patch 3 Patch Transdermal daily  methocarbamol 500 milliGRAM(s) Oral every 8 hours  methylPREDNISolone sodium succinate Injectable 125 milliGRAM(s) IV Push every 8 hours  methylPREDNISolone sodium succinate Injectable   IV Push   metoclopramide Injectable 10 milliGRAM(s) IV Push every 8 hours  milrinone Infusion 0.25 MICROgram(s)/kG/Min IV Continuous <Continuous>  mupirocin 2% Ointment 1 Application(s) Topical two times a day  naloxegol 25 milliGRAM(s) Oral daily  pantoprazole  Injectable 40 milliGRAM(s) IV Push daily  polyethylene glycol 3350 17 Gram(s) Oral daily  propofol Infusion 50 MICROgram(s)/kG/Min IV Continuous <Continuous>  senna 2 Tablet(s) Oral at bedtime  simethicone 160 milliGRAM(s) Chew every 8 hours  sodium chloride 0.9%. 1000 milliLiter(s) IV Continuous <Continuous>  traMADol 25 milliGRAM(s) Oral every 8 hours      Objective:  Vital Signs Last 24 Hrs  T(C): 36.7 (25 Dec 2023 12:00), Max: 37.1 (25 Dec 2023 08:45)  T(F): 98.1 (25 Dec 2023 12:00), Max: 98.8 (25 Dec 2023 08:45)  HR: 81 (25 Dec 2023 15:30) (69 - 126)  BP: --  BP(mean): --  RR: 14 (25 Dec 2023 15:30) (7 - 37)  SpO2: 97% (25 Dec 2023 15:30) (93% - 100%)    Parameters below as of 25 Dec 2023 12:00  Patient On (Oxygen Delivery Method): ventilator    O2 Concentration (%): 40    PHYSICAL EXAM:  Constitutional:no acute distress, sedated intubated  Eyes:MARVEL, EOMI  Ear/Nose/Throat: no oral lesions, right CVC/martin	  Respiratory: clear BL  chest tubes x4  Cardiovascular: S1S2 sternotomy dressing CDI  Gastrointestinal:soft, (+) BS, no tenderness  Extremities:no e/e/c  IABP femoral line remains in place  No Lymphadenopathy  IV sites not inflammed.    LABS:                        10.9   11.34 )-----------( 240      ( 25 Dec 2023 09:11 )             32.1     12-25    141  |  103  |  28<H>  ----------------------------<  247<H>  4.4   |  24  |  1.29    Ca    9.1      25 Dec 2023 09:12  Phos  4.0     12-25  Mg     2.3     12-25    TPro  6.2  /  Alb  3.7  /  TBili  1.0  /  DBili  0.4<H>  /  AST  87<H>  /  ALT  42  /  AlkPhos  55  12-25    PT/INR - ( 25 Dec 2023 09:11 )   PT: 12.9 sec;   INR: 1.18 ratio         PTT - ( 25 Dec 2023 09:11 )  PTT:29.2 sec  Urinalysis Basic - ( 25 Dec 2023 09:12 )    Color: x / Appearance: x / SG: x / pH: x  Gluc: 247 mg/dL / Ketone: x  / Bili: x / Urobili: x   Blood: x / Protein: x / Nitrite: x   Leuk Esterase: x / RBC: x / WBC x   Sq Epi: x / Non Sq Epi: x / Bacteria: x        MICROBIOLOGY:  HIV-1/2 Combo Result: Nonreact: Nonreactive: HIV-1 p24 antigen and HIV-1/HIV-2 antibodies were not    Rapid RVP Result: NotDetec (12.24.23 @ 15:09)                RECENT CULTURES:      RADIOLOGY & ADDITIONAL STUDIES:    < from: Xray Chest 1 View- PORTABLE-Routine (12.25.23 @ 08:23) >  IMPRESSION:  S/P heart transplant.  No acute pulmonary disease.    < end of copied text >   INFECTIOUS DISEASES FOLLOW UP-- Courtney Peters  [phone]    This is a follow up note for this  58yMale with  Non-ST elevation myocardial infarction (NSTEMI)    s/p OHTx on 12/25        ROS:  CONSTITUTIONAL:  No fever, remains sedated, intubated  CARDIOVASCULAR:  No chest pain or palpitations  RESPIRATORY:  No dyspnea  GASTROINTESTINAL:  No nausea, vomiting, diarrhea, or abdominal pain  GENITOURINARY:  No dysuria  NEUROLOGIC:  No headache,     Allergies    penicillins (Unknown)    Intolerances        ANTIBIOTICS/RELEVANT:  antimicrobials  cefepime   IVPB 1000 milliGRAM(s) IV Intermittent every 8 hours  DAPTOmycin IVPB 500 milliGRAM(s) IV Intermittent <User Schedule>  vancomycin    Solution 125 milliGRAM(s) Oral every 12 hours    immunologic:  basiliximab  IVPB 20 milliGRAM(s) IV Intermittent once  mycophenolate mofetil IVPB 1000 milliGRAM(s) IV Intermittent <User Schedule>    OTHER:  chlorhexidine 0.12% Liquid 15 milliLiter(s) Oral Mucosa every 12 hours  chlorhexidine 4% Liquid 1 Application(s) Topical once  dextrose 50% Injectable 50 milliLiter(s) IV Push every 15 minutes  dextrose 50% Injectable 25 milliLiter(s) IV Push every 15 minutes  EPINEPHrine    Infusion 0.02 MICROgram(s)/kG/Min IV Continuous <Continuous>  gabapentin 100 milliGRAM(s) Oral every 8 hours  insulin regular Infusion 3 Unit(s)/Hr IV Continuous <Continuous>  lidocaine   4% Patch 3 Patch Transdermal daily  methocarbamol 500 milliGRAM(s) Oral every 8 hours  methylPREDNISolone sodium succinate Injectable 125 milliGRAM(s) IV Push every 8 hours  methylPREDNISolone sodium succinate Injectable   IV Push   metoclopramide Injectable 10 milliGRAM(s) IV Push every 8 hours  milrinone Infusion 0.25 MICROgram(s)/kG/Min IV Continuous <Continuous>  mupirocin 2% Ointment 1 Application(s) Topical two times a day  naloxegol 25 milliGRAM(s) Oral daily  pantoprazole  Injectable 40 milliGRAM(s) IV Push daily  polyethylene glycol 3350 17 Gram(s) Oral daily  propofol Infusion 50 MICROgram(s)/kG/Min IV Continuous <Continuous>  senna 2 Tablet(s) Oral at bedtime  simethicone 160 milliGRAM(s) Chew every 8 hours  sodium chloride 0.9%. 1000 milliLiter(s) IV Continuous <Continuous>  traMADol 25 milliGRAM(s) Oral every 8 hours      Objective:  Vital Signs Last 24 Hrs  T(C): 36.7 (25 Dec 2023 12:00), Max: 37.1 (25 Dec 2023 08:45)  T(F): 98.1 (25 Dec 2023 12:00), Max: 98.8 (25 Dec 2023 08:45)  HR: 81 (25 Dec 2023 15:30) (69 - 126)  BP: --  BP(mean): --  RR: 14 (25 Dec 2023 15:30) (7 - 37)  SpO2: 97% (25 Dec 2023 15:30) (93% - 100%)    Parameters below as of 25 Dec 2023 12:00  Patient On (Oxygen Delivery Method): ventilator    O2 Concentration (%): 40    PHYSICAL EXAM:  Constitutional:no acute distress, sedated intubated  Eyes:MARVEL, EOMI  Ear/Nose/Throat: no oral lesions, right CVC/martin	  Respiratory: clear BL  chest tubes x4  Cardiovascular: S1S2 sternotomy dressing CDI  Gastrointestinal:soft, (+) BS, no tenderness  Extremities:no e/e/c  IABP femoral line remains in place  No Lymphadenopathy  IV sites not inflammed.    LABS:                        10.9   11.34 )-----------( 240      ( 25 Dec 2023 09:11 )             32.1     12-25    141  |  103  |  28<H>  ----------------------------<  247<H>  4.4   |  24  |  1.29    Ca    9.1      25 Dec 2023 09:12  Phos  4.0     12-25  Mg     2.3     12-25    TPro  6.2  /  Alb  3.7  /  TBili  1.0  /  DBili  0.4<H>  /  AST  87<H>  /  ALT  42  /  AlkPhos  55  12-25    PT/INR - ( 25 Dec 2023 09:11 )   PT: 12.9 sec;   INR: 1.18 ratio         PTT - ( 25 Dec 2023 09:11 )  PTT:29.2 sec  Urinalysis Basic - ( 25 Dec 2023 09:12 )    Color: x / Appearance: x / SG: x / pH: x  Gluc: 247 mg/dL / Ketone: x  / Bili: x / Urobili: x   Blood: x / Protein: x / Nitrite: x   Leuk Esterase: x / RBC: x / WBC x   Sq Epi: x / Non Sq Epi: x / Bacteria: x        MICROBIOLOGY:  HIV-1/2 Combo Result: Nonreact: Nonreactive: HIV-1 p24 antigen and HIV-1/HIV-2 antibodies were not    Rapid RVP Result: NotDetec (12.24.23 @ 15:09)                RECENT CULTURES:      RADIOLOGY & ADDITIONAL STUDIES:    < from: Xray Chest 1 View- PORTABLE-Routine (12.25.23 @ 08:23) >  IMPRESSION:  S/P heart transplant.  No acute pulmonary disease.    < end of copied text >

## 2023-12-25 NOTE — PHARMACOTHERAPY INTERVENTION NOTE - COMMENTS
The patient received heart transplant on 2023.     Provider responsible for pre-op orders: DAVID Garrett NP  Surgical antibiotics were discussed with transplant ID: Silviano Shine   Recipient – heart transplant: Ravi Hardy ( 1965), [facility], 04    All PHS serologies were sent on 11/10 except: Hepatitis C Antibody and HIV-1/2 Antigen/Antibody Screen by [facility]. Will send today.      To discuss with RN pre-op-ing the patient:  Mycophenolate mofetil (Cellcept) 1,000 mG IVPB will be used for OR case. The OR to request mycophenolate mofetil (Cellcept) --- ADD special instructions under order please. Please explain to CICU RN that mycophenolate is NOT to be requested from pharmacy due to the short stability. Otherwise, we will waste a dose.     The patient will receive basiliximab (Simulect) --- at CTU      MRSA/MSSA: please send swab today    COVID swab – ordered/reminded to send it ASAP     Cultures Data:  Recipient:  Cutibacterium acnes, blood (2023)  Enterococcus faecalis (sensitive to everything), urine (2023)  Staph epi, blood (2023) - sensitive to everything    Enterococcus faecalis, blood (2023) - sensitive to everything   COVID - 2023    Donor:  Staph aureus (sputum, )  Hepatitis C antibody positive but TIM negative       Donor 	Recipient   CMV -/+  Toxoplasmosis -/-  D: Anti-HCV + HCV TIM -	  R: Hepatitis B not immune (received only 1 dose of Hepislav)      Last BM: 2023  Allergies: Penicillin, ? rash to vancomycin (we are not going to use vanco)  Current Diet: NPO     Bed weight (Sunrise) – 88 kG (2023)   Dosing weight (Sunrise header section) – 98.9 kG (2023)   Height – 167.6 cm       Steroids and Endocrinology Staff Pre-op:   Lantus 12 units at bedtime  Admelog 9 units prior to each meal       Vasodilators/Diuretics/CV Medications:    Carvedilol 25 mG PO twice a day (6 AM and 6 PM)  Hydralazine 50 mG PO 3 times a day (6 AM, 2 PM, 10 PM)  Isosorbide dinitrate 30 mG PO 3 times a day      Anticoagulation:     Aspirin 81 mG PO daily   Heparin infusion at 1,400 Units/hr   aPTT 70.7 ( at 01:23)    Last type and screen was on 2023 at 01:51      OR       Surgical prophylaxis in OR:     Cefepime 1,000 IVPB ONCE – will be stapled to the chart.    •         Re-Dose: cefepime 1,000 mG IVPB in 6 hours once  Daptomycin 500 mG IVPB – will be stapled to the chart.      •         Do NOT re-dose in OR!!!!          Induction:     Methylprednisolone 500 mG IVPB times 2 doses (taped to the chart)    Mycophenolate mofetil (Cellcept) 1,000 mG IVPB once (to be requested by the OR due to short stability)       CT-ICU Post-Op      Use Heart Transplant Post-Op Order set       Surgical prophylaxis in post-operative:     Mupirocin twice a day for 5 days    Cefepime 1,000 mG IVPB q8h    Daptomycin 500 mG IVPB q24h for 3 days         Immunosuppression:     Mycophenolate mofetil (Cellcept) 1,000 mG IVPB at 8 AM and 8 PM  Basiliximab 20 mG ---- will be ordered by the heart transplant team on POD0 and POD4      Anti-infective Prophylaxis:   Vancomycin 125 mG NGT q12h (C. Diff prophylaxis) while on systemic antibiotics        Pain/Bowel Regiment ---- Please, add these orders POD0   Gabapentin 100 mG PO/NGT q8h – no stop date. He was on it pre-op    Methocarbamol 500 mG NGT q8h for 7 days    Acetaminophen 500 mG NGT q6h for 7 days   Tramadol 25 mG PO q8h for 7 days -- standing order   Lidocaine patches apply 3 patches around the chest tubes (just change 1 patch to 3 patches; there is no need to place 3 separate orders. Thank you!)   Naloxegol (Movantik) 25 mG NGT to prevent ileus   Metoclopramide 10 mG IV Push q8h --- standing order   Simethicone 160 mG PO q8h for 7 days --- standing order   Add senna/Miralax when it is feasible       Other pre-operative medications to consider/discuss:     Atorvastatin 80 mG daily ---- please do NOT re-order post-op  Symbicort 80/4.5 2 puffs PO twice a day ---- will order when extubated   Bisacodyl 5 mG PO twice a day   Hydroxyzine HCl 25 mG twice a day as needed for anxiety ---- can re-order post-op  Miralax 17 grams PO twice a day   Senna 2 tablets PO at bedtime          The patient received heart transplant on 2023.     Provider responsible for pre-op orders: DAVID Garrett NP  Surgical antibiotics were discussed with transplant ID: Silviano Shine   Recipient – heart transplant: Ravi Hardy ( 1965), [facility], 04    All PHS serologies were sent on 11/10 except: Hepatitis C Antibody and HIV-1/2 Antigen/Antibody Screen by [facility]. Will send today.      To discuss with RN pre-op-ing the patient:  Mycophenolate mofetil (Cellcept) 1,000 mG IVPB will be used for OR case. The OR to request mycophenolate mofetil (Cellcept) --- ADD special instructions under order please. Please explain to CICU RN that mycophenolate is NOT to be requested from pharmacy due to the short stability. Otherwise, we will waste a dose.     The patient will receive basiliximab (Simulect) --- at CTU      MRSA/MSSA: please send swab today    COVID swab – ordered/reminded to send it ASAP     Cultures Data:  Recipient:  Cutibacterium acnes, blood (2023)  Enterococcus faecalis (sensitive to everything), urine (2023)  Staph epi, blood (2023) - sensitive to everything    Enterococcus faecalis, blood (2023) - sensitive to everything   COVID - 2023    Donor:  Staph aureus (sputum, )  Hepatitis C antibody positive but TIM negative       Donor 	Recipient   CMV -/+  Toxoplasmosis -/-  D: Anti-HCV + HCV TIM -	  R: Hepatitis B not immune (received only 1 dose of Hepislav)      Last BM: 2023  Allergies: Penicillin, ? rash to vancomycin (we are not going to use vanco)  Current Diet: NPO     Bed weight (Sunrise) – 88 kG (2023)   Dosing weight (Sunrise header section) – 98.9 kG (2023)   Height – 167.6 cm       Steroids and Endocrinology Staff Pre-op:   Lantus 12 units at bedtime  Admelog 9 units prior to each meal       Vasodilators/Diuretics/CV Medications:    Carvedilol 25 mG PO twice a day (6 AM and 6 PM)  Hydralazine 50 mG PO 3 times a day (6 AM, 2 PM, 10 PM)  Isosorbide dinitrate 30 mG PO 3 times a day      Anticoagulation:     Aspirin 81 mG PO daily   Heparin infusion at 1,400 Units/hr   aPTT 70.7 ( at 01:23)    Last type and screen was on 2023 at 01:51      OR       Surgical prophylaxis in OR:     Cefepime 1,000 IVPB ONCE – will be stapled to the chart.    •         Re-Dose: cefepime 1,000 mG IVPB in 6 hours once  Daptomycin 500 mG IVPB – will be stapled to the chart.      •         Do NOT re-dose in OR!!!!          Induction:     Methylprednisolone 500 mG IVPB times 2 doses (taped to the chart)    Mycophenolate mofetil (Cellcept) 1,000 mG IVPB once (to be requested by the OR due to short stability)       CT-ICU Post-Op      Use Heart Transplant Post-Op Order set       Surgical prophylaxis in post-operative:     Mupirocin twice a day for 5 days    Cefepime 1,000 mG IVPB q8h    Daptomycin 500 mG IVPB q24h for 3 days         Immunosuppression:     Mycophenolate mofetil (Cellcept) 1,000 mG IVPB at 8 AM and 8 PM  Basiliximab 20 mG ---- will be ordered by the heart transplant team on POD0 and POD4      Anti-infective Prophylaxis:   Vancomycin 125 mG NGT q12h (C. Diff prophylaxis) while on systemic antibiotics        Pain/Bowel Regiment ---- Please, add these orders POD0   Gabapentin 100 mG PO/NGT q8h – no stop date. He was on it pre-op    Methocarbamol 500 mG NGT q8h for 7 days    Acetaminophen 500 mG NGT q6h for 7 days   Tramadol 25 mG PO q8h for 7 days -- standing order   Lidocaine patches apply 3 patches around the chest tubes (just change 1 patch to 3 patches; there is no need to place 3 separate orders. Thank you!)   Naloxegol (Movantik) 25 mG NGT to prevent ileus   Metoclopramide 10 mG IV Push q8h --- standing order   Simethicone 160 mG PO q8h for 7 days --- standing order   Add senna/Miralax when it is feasible       Other pre-operative medications to consider/discuss:     Atorvastatin 80 mG daily ---- please do NOT re-order post-op  Symbicort 80/4.5 2 puffs PO twice a day ---- will order when extubated   Bisacodyl 5 mG PO twice a day   Hydroxyzine HCl 25 mG twice a day as needed for anxiety ---- can re-order post-op  Miralax 17 grams PO twice a day   Senna 2 tablets PO at bedtime

## 2023-12-25 NOTE — PROGRESS NOTE ADULT - SUBJECTIVE AND OBJECTIVE BOX
Patient seen and examined at the bedside.    Remained critically ill on continuous ICU monitoring.    OBJECTIVE:  Vital Signs Last 24 Hrs  T(C): 37 (25 Dec 2023 16:00), Max: 37.1 (25 Dec 2023 08:45)  T(F): 98.6 (25 Dec 2023 16:00), Max: 98.8 (25 Dec 2023 08:45)  HR: 81 (25 Dec 2023 17:00) (69 - 126)  BP: --  BP(mean): --  RR: 14 (25 Dec 2023 17:00) (7 - 37)  SpO2: 98% (25 Dec 2023 17:00) (93% - 100%)    Parameters below as of 25 Dec 2023 16:00  Patient On (Oxygen Delivery Method): ventilator    O2 Concentration (%): 40      Physical Exam:  General: intubated multiple lines gtt & tubes   Neurology: sedated   Eyes: bilaeral pupils equal and reactive   ENT/Neck: +ETT midline, Neck supple, trachea midline, No JVD   Respiratory: Rales noted bilaterally   CV: RRR, S1S2, no murmurs        [x] Sternal dressing, [x] Mediastinal CTx3, [x] Pleural CTx2        [x] Sinus rhythm[x] Temporary pacing- DDD-60  Abdominal: Soft, NT, ND +BS,   Extremities: 1-2+ pedal edema noted, + peripheral pulses   Skin: No Rashes, Hematoma, Ecchymosis                           Assessment:    Plan:   ***Neuro***  [x] Sedated with [x] Precedex [x] Propofol  Post operative neuro assessment   Gabapentin, tramadol and Robaxin for pain     ***Cardiovascular***  Invasive hemodynamic monitoring, assess perfusion indices   SR 80 / CVP 6-8 / MAP 75-85 / PAP 22/9 (15)/ CI 2.3 / SvO2 63 / Hct 32.1 / Lactate 1.7   [x] Primacor 0.25 mcg/Kg/min   [x] Epinephrine 0.02 mcg/kg/min   Volume: [x] Plts 2 [x] Cryo 10   Reassessment of hemodynamics post resuscitation   Monitor chest tube outputs   [x] Bleeding _  Serial EKG and cardiac enzymes     ***Pulmonary***  Post op vent management  Titration of FiO2 and PEEP, follow SpO2, CXR, blood gases     Mode: AC/ CMV (Assist Control/ Continuous Mandatory Ventilation)  RR (machine): 14  FiO2: 40  PEEP: 8  ITime: 1  MAP: 13  PC: 14  PIP: 23              Will plan to wean & extubate once pt is hemodynamically stable and not bleeding    ***GI***  NPO  [x] Protonix   Bowel regimen with Miralax and Senna   Movantik for constipation     ***Renal***  GFR 64   Continue to monitor I/Os, BUN/Creatinine.   Replete lytes PRN  Llamas present [x] positive     ***ID***  Cefepime / Daptomycin / Vancomycin   Cellcept/ Solu-medrol/ Basiliximab for immunosuppression    ***Endocrine***  [x] Stress Hyperglycemia : HbA1c 8.3_%               - [x] Insulin gtt              - Need tight glycemic control to prevent wound infection.      Patient requires continuous monitoring with bedside rhythm monitoring, pulse oximetry monitoring, and continuous central venous and arterial pressure monitoring; and intermittent blood gas analysis. Care plan discussed with the ICU care team.   Patient remained critical, at risk for life threatening decompensation.    I have spent 75 minutes providing critical care management to this patient.    By signing my name below, I, Kalyn Sousa, attest that this documentation has been prepared under the direction and in the presence of Aruna Green MD   Electronically signed: Rosalba Booth, 12-25-23 @ 17:05    I, Aruna Green, personally performed the services described in this documentation. all medical record entries made by the eileenibmartha were at my direction and in my presence. I have reviewed the chart and agree that the record reflects my personal performance and is accurate and complete  Electronically signed: Aruna Green MD  Patient seen and examined at the bedside.    Remained critically ill on continuous ICU monitoring.    OBJECTIVE:  Vital Signs Last 24 Hrs  T(C): 37 (25 Dec 2023 16:00), Max: 37.1 (25 Dec 2023 08:45)  T(F): 98.6 (25 Dec 2023 16:00), Max: 98.8 (25 Dec 2023 08:45)  HR: 81 (25 Dec 2023 17:00) (69 - 126)  BP: --  BP(mean): --  RR: 14 (25 Dec 2023 17:00) (7 - 37)  SpO2: 98% (25 Dec 2023 17:00) (93% - 100%)    Parameters below as of 25 Dec 2023 16:00  Patient On (Oxygen Delivery Method): ventilator    O2 Concentration (%): 40      Physical Exam:  General: intubated multiple lines gtt & tubes   Neurology: sedated   Eyes: bilateral pupils equal and reactive   ENT/Neck: +ETT midline, Neck supple, trachea midline, No JVD   Respiratory: Rales noted bilaterally   CV: RRR, S1S2, no murmurs        [x] Sternal dressing, [x] Mediastinal CTx3, [x] Pleural CTx2        [x] Sinus rhythm[x] Temporary pacing- DDD-60  Abdominal: Soft, NT, ND +BS,   Extremities: 1-2+ pedal edema noted, + peripheral pulses   Skin: No Rashes, Hematoma, Ecchymosis                           Assessment:    Plan:   ***Neuro***  [x] Sedated with [x] Precedex [x] Propofol  Post operative neuro assessment   Gabapentin, tramadol and Robaxin for pain     ***Cardiovascular***  Invasive hemodynamic monitoring, assess perfusion indices   SR 80 / CVP 6-8 / MAP 75-85 / PAP 22/9 (15)/ CI 2.3 / SvO2 63 / Hct 32.1 / Lactate 1.7   [x] Primacor 0.25 mcg/Kg/min   [x] Epinephrine 0.02 mcg/kg/min   [x] Plts 2  [x] Cryo 10   CTU: Albumin 250cc x2 150cc x1  Reassessment of hemodynamics post resuscitation   Monitor chest tube outputs   [x] Bleeding _  Serial EKG and cardiac enzymes     ***Pulmonary***  Post op vent management  Titration of FiO2 and PEEP, follow SpO2, CXR, blood gases     Mode: AC/ CMV (Assist Control/ Continuous Mandatory Ventilation)  RR (machine): 14  FiO2: 40  PEEP: 8  ITime: 1  MAP: 13  PC: 14  PIP: 23              Will plan to wean & extubate once pt is hemodynamically stable and not bleeding    ***GI***  NPO  [x] Protonix   Bowel regimen with Miralax and Senna   Movantik for constipation     ***Renal***  GFR 64   Continue to monitor I/Os, BUN/Creatinine.   Replete lytes PRN  Llamas present [x] positive     ***ID***  Cefepime / Daptomycin / Vancomycin   Cellcept/ Solu-medrol/ Basiliximab for immunosuppression    ***Endocrine***  [x] Stress Hyperglycemia : HbA1c 8.3_%               - [x] Insulin gtt              - Need tight glycemic control to prevent wound infection.      Patient requires continuous monitoring with bedside rhythm monitoring, pulse oximetry monitoring, and continuous central venous and arterial pressure monitoring; and intermittent blood gas analysis. Care plan discussed with the ICU care team.   Patient remained critical, at risk for life threatening decompensation.    I have spent 75 minutes providing critical care management to this patient.    By signing my name below, I, Kalyn Sousa, attest that this documentation has been prepared under the direction and in the presence of Aruna Green MD   Electronically signed: Rosalba Booth, 12-25-23 @ 17:05    I, Aruna Green, personally performed the services described in this documentation. all medical record entries made by the scribe were at my direction and in my presence. I have reviewed the chart and agree that the record reflects my personal performance and is accurate and complete  Electronically signed: Aruna Green MD  Patient seen and examined at the bedside.    Remained critically ill on continuous ICU monitoring.    OBJECTIVE:  Vital Signs Last 24 Hrs  T(C): 37 (25 Dec 2023 16:00), Max: 37.1 (25 Dec 2023 08:45)  T(F): 98.6 (25 Dec 2023 16:00), Max: 98.8 (25 Dec 2023 08:45)  HR: 81 (25 Dec 2023 17:00) (69 - 126)  BP: --  BP(mean): --  RR: 14 (25 Dec 2023 17:00) (7 - 37)  SpO2: 98% (25 Dec 2023 17:00) (93% - 100%)    Parameters below as of 25 Dec 2023 16:00  Patient On (Oxygen Delivery Method): ventilator    O2 Concentration (%): 40      Physical Exam:  General: intubated multiple lines gtt & tubes   Neurology: sedated   Eyes: bilateral pupils equal and reactive   ENT/Neck: +ETT midline, Neck supple, trachea midline, No JVD   Respiratory: Rales noted bilaterally   CV: RRR, S1S2, no murmurs        [x] Sternal dressing, [x] Mediastinal CTx3, [x] Pleural CTx2        [x] Sinus rhythm[x] Temporary pacing- DDD-60 /10/10  Abdominal: Soft, NT, ND +BS,   Extremities: 1-2+ pedal edema noted, + peripheral pulses   Skin: No Rashes, Hematoma, Ecchymosis                           Assessment:  58 yr male DM2 / HLD / CAD / S/P PCI / ICM / CVA S/P TPA 2018  ACC/AHA stage D with  worsening cardiac function multiple cardiac arrest requiring IABP support   S/P IABP related BSI   S/P OHT   Cardiogenic shock   Hypovolemia   Hyperglycemia     Plan:   ***Neuro***  [x] Sedated  [x] Precedex   Post operative neuro assessment       ***Cardiovascular***  Postop labile hemodynamics   Post op dual inotrope + IABP   Intra -op CHANDLER moderate LV dysfunction  Invasive hemodynamic monitoring, assess serial perfusion indices   SR 80 / CVP 6-8 / MAP 75-85 / PAP 22/9 (15)/ CI 2.3-2.6 / SvO2 71 / Hct 32.1 / Lactate 1.4   [x] LFA IABP 1:1 diastolic augmentation monitor LLE for any ischemic complication   [x] Post Tx SR / bradycardic HR 80 consider AV pacing 90  [x] Nitric oxide 20 PPM v 10 PPM   [x] Primacor 0.25 mcg/Kg/min   [x] Epinephrine 0.02 mcg/kg/min   [X] Cardene 2-5 mg/hr Target MAP <80   [x] Albumin 5% 500 cc + 25% 100 cc   [x] Hct 32%   [x]  Albumin 5% 500 cc + 25 % 100 cc   [x] Monitor chest tube outputs   [x] K >4 [X] Mg >2   [x] immunosuppression Steroids, Cellcept Simulect x 1        ***Pulmonary***  Post op vent management  Titration of FiO2 and PEEP, follow SpO2, CXR, blood gases     Mode: AC/ CMV (Assist Control/ Continuous Mandatory Ventilation)  RR (machine): 14  FiO2: 40  PEEP: 8  ITime: 1  MAP: 13  PC: 14  PIP: 23              Will plan to wean & extubate once pt is hemodynamically stable and not bleeding    ***GI***  NPO  [x] Protonix   Bowel regimen with Miralax and Senna   Movantik for constipation     ***Renal***  GFR 64   Continue to monitor I/Os, BUN/Creatinine.   Replete lytes PRN  Llamas present [x] positive     ***ID***  Pre-op multiple nosocomial BSI   Post op Cefepime / Daptomycin       ***Endocrine***  [x] DM2  : HbA1c 8.3_%               - [x] Insulin gtt              - Need tight glycemic control to prevent wound infection.      Patient requires continuous monitoring with bedside rhythm monitoring, pulse oximetry monitoring, and continuous central venous and arterial pressure monitoring; and intermittent blood gas analysis. Care plan discussed with the ICU care team.   Patient remained critical, at risk for life threatening decompensation.    I have spent 75 minutes providing critical care management to this patient.    By signing my name below, I, Kalyn Sousa, attest that this documentation has been prepared under the direction and in the presence of Aruna Green MD   Electronically signed: Rosalba Booth, 12-25-23 @ 17:05    I, Aruna Green, personally performed the services described in this documentation. all medical record entries made by the scribe were at my direction and in my presence. I have reviewed the chart and agree that the record reflects my personal performance and is accurate and complete  Electronically signed: Aruna Green MD

## 2023-12-25 NOTE — PROGRESS NOTE ADULT - PROBLEM SELECTOR PLAN 1
- On 12/25.   - Drips: Propofol 50, Milrinone 0.25, Epi 0.02  - Changed from RFA IABP to LFA IABP on 12/21. On AC with Heparin infusion   - Please keep primary Dr. Banuelos [phone] in the loop per family request. - On 12/25.   - Drips: Propofol 50, Milrinone 0.25, Epi 0.02  - Changed from RFA IABP to LFA IABP on 12/21. On AC with Heparin infusion   - Please keep primary Dr. Banuelos [phone] in the loop per family request. - On 12/25  - Drips: Propofol 50, Milrinone 0.25, Epi 0.02  - Changed from RFA IABP to LFA IABP on 12/21. On AC with Heparin infusion   - Please keep primary Dr. Banuelos [phone] in the loop per family request. - On 12/25  - Drips: Propofol 50, Milrinone 0.25, Epi 0.02  - Changed from RFA IABP to LFA IABP on 12/21. On AC with Heparin infusion   - Please keep primary Dr. Banuelos [phone] in the loop per family request. - On 12/25. Ischemic Time: 253min.  - Drips: Propofol 50, Milrinone 0.25, Epi 0.02  - Changed from RFA IABP to LFA IABP on 12/21. On AC with Heparin infusion   - Please keep primary Dr. Banuelos [phone] in the loop per family request. - On 12/25. Ischemic Time: 253min.  - Drips: Propofol 50, Milrinone 0.25, Epi 0.02  - Changed from RFA IABP to LFA IABP on 12/21. On AC with Heparin infusion   - Please keep primary Dr. Banuelos [phone] in the loop per family request.

## 2023-12-25 NOTE — PROGRESS NOTE ADULT - PROBLEM SELECTOR PLAN 3
- CMV:  - Toxo:  - PJP ppx: will introduce eventualy   - Trush ppx: eventual Nystatin. - CMV -/+: intermediate risk.  Will need to start on valganciclovir when able X 6 months.    - Toxo -/-: none required  - PJP ppx: will introduce eventually   - Trush ppx: eventual Nystatin.  - donor HCV TIM - but HCV antibody +.  - on daptomycin ppx for hx of rash to vancomycin and donor sputum + for staph.

## 2023-12-25 NOTE — PROGRESS NOTE ADULT - ASSESSMENT
59 yo M with PMHx of HTN, CAD w/ 1 stent in 2009, ICH (2008) presented on 11/1 with abn ekg. Patient presented to [facility] where he was found to have STEMI, recommended to get cath however patient did not want to get it there so he left and came here.   EKG here with ST depression in lateral leads and elevation in anterior leads   Prior to C found to be tachycardic, dyspneic, intubated   [facility] 11/1 with chronic total occlusion of LAD and RCA, with elevated RA and PA pressures  TTE 11/1 with severely decreased EF 32%, s/p IABP 11/1    Underwent heart transplant on 12/25/23     Problem/Plan - s/p heart transplant  ·  Problem: Prophylactic antibiotic.   ·  Plan: - CMV -/+: intermediate risk.  Will need to start on valganciclovir when able X 6 months.    - Toxo -/-: none required  - PJP ppx: will introduce eventually   - Trush ppx: eventual Nystatin.  - donor HCV TIM - but HCV antibody +.  - on daptomycin ppx for hx of rash to vancomycin and donor sputum + for staph.    #Rash  resolved    # hep B and Hep A not immune  received vaccine series but second dose given early  will repeat ab titers     Problem/Plan -   ·  Problem: Bacteremia.   ·  Plan: BCx from 11/17 with GPC in pair/chains in both bottles; Mixed cultures Staph epi and Enterococcus faecalis . Repeat cultures from 11/18; NGTD  + rods in blood culture on 11/30 (reported on 12/4= cutibacterium), repeat BCxs Negative Completed abx course.   receiving Daptomycin perioperative prophylaxis      Chao Peters MD  Can be called via Teams  After 5pm/weekends [phone]     57 yo M with PMHx of HTN, CAD w/ 1 stent in 2009, ICH (2008) presented on 11/1 with abn ekg. Patient presented to [facility] where he was found to have STEMI, recommended to get cath however patient did not want to get it there so he left and came here.   EKG here with ST depression in lateral leads and elevation in anterior leads   Prior to C found to be tachycardic, dyspneic, intubated   [facility] 11/1 with chronic total occlusion of LAD and RCA, with elevated RA and PA pressures  TTE 11/1 with severely decreased EF 32%, s/p IABP 11/1    Underwent heart transplant on 12/25/23     Problem/Plan - s/p heart transplant  ·  Problem: Prophylactic antibiotic.   ·  Plan: - CMV -/+: intermediate risk.  Will need to start on valganciclovir when able X 6 months.    - Toxo -/-: none required  - PJP ppx: will introduce eventually   - Trush ppx: eventual Nystatin.  - donor HCV TIM - but HCV antibody +.  - on daptomycin ppx for hx of rash to vancomycin and donor sputum + for staph.    #Rash  resolved    # hep B and Hep A not immune  received vaccine series but second dose given early  will repeat ab titers     Problem/Plan -   ·  Problem: Bacteremia.   ·  Plan: BCx from 11/17 with GPC in pair/chains in both bottles; Mixed cultures Staph epi and Enterococcus faecalis . Repeat cultures from 11/18; NGTD  + rods in blood culture on 11/30 (reported on 12/4= cutibacterium), repeat BCxs Negative Completed abx course.   receiving Daptomycin perioperative prophylaxis      Chao Peters MD  Can be called via Teams  After 5pm/weekends [phone]

## 2023-12-25 NOTE — PROGRESS NOTE ADULT - PROBLEM SELECTOR PLAN 2
- Steroid taper - Steroid taper per protocol   - Cellcept 1g BID - will give Simulect for POD 0 and 4.  - Steroid taper per protocol   - Cellcept 1g BID  - tacrolimus to be initiated tonight if renal function stable.

## 2023-12-25 NOTE — PROGRESS NOTE ADULT - PROBLEM SELECTOR PLAN 5
BCx from 11/17 with GPC in pair/chains in both bottles; Mixed cultures Staph epi and Enterococcus faecalis . Repeat cultures from 11/18; NGTD.  Has remote Hx PCN allergy per ID but unclear as he doesn't recall reaction.   -s/p IABP exchange from RFA to LFA on 11/20.  + rods in blood culture on 11/30 (reported on 12/4), repeat BCxs NGTD. Completed abx course.   appreciated transplant ID recs.

## 2023-12-25 NOTE — BRIEF OPERATIVE NOTE - OPERATION/FINDINGS
Aortic XClamp: 80    |    Total CPB: 124     |     Cold Ischemic Time: 253min    Procedure:  Heart Transplant  First anastomosis @ 04:14

## 2023-12-25 NOTE — PROGRESS NOTE ADULT - SUBJECTIVE AND OBJECTIVE BOX
LD VALENCIA  58y Male  MRN:[id number]    Patient is a 58y old  Male who presents with a chief complaint of NSTEMI (01 Nov 2023 20:29)    HPI:  57yo M w/ hx HTN, CAD w/ 1 stent in 2009, ICH (2008) presenting with abn ekg. Patient presented to [facility] where he was found to have STEMI, recommended to get cath however patient did not want to get it there so it left and came here.  Patient initially had cough, congestion, fever, was placed on antibiotics on Sunday.  Started feeling nauseous and had a presyncopal event after which he presented to ED last night.  Had chest pain as well.  Chest pain is midsternal.  Not currently having chest pain.  Received 4 aspirin 30 min pta. (01 Nov 2023 15:11)      Patient seen and evaluated at bedside in CTU. interval events noted    Interval HPI: s/p heart transplant surg last night. remains intubated.       PAST MEDICAL & SURGICAL HISTORY:  HTN (hypertension)      CAD (coronary artery disease)  2009; stent      Intracranial hemorrhage  2008      Respiratory arrest  december 1st      Myocardial infarction, unspecified MI type, unspecified artery      History of coronary artery stent placement          REVIEW OF SYSTEMS:  as per hpi     VITALS:   ICU Vital Signs Last 24 Hrs  T(C): 36.7 (25 Dec 2023 12:00), Max: 37.1 (25 Dec 2023 08:45)  T(F): 98.1 (25 Dec 2023 12:00), Max: 98.8 (25 Dec 2023 08:45)  HR: 80 (25 Dec 2023 15:04) (69 - 126)  BP: 94/48 (24 Dec 2023 15:23) (94/48 - 94/48)  BP(mean): 63 (24 Dec 2023 15:23) (63 - 63)  ABP: 107/40 (25 Dec 2023 14:45) (75/39 - 127/54)  ABP(mean): 70 (25 Dec 2023 14:45) (60 - 94)  RR: 14 (25 Dec 2023 14:45) (7 - 37)  SpO2: 97% (25 Dec 2023 15:04) (93% - 100%)    O2 Parameters below as of 25 Dec 2023 12:00  Patient On (Oxygen Delivery Method): ventilator    O2 Concentration (%): 40        PHYSICAL EXAM:  GENERAL: intubated, sedated   HEAD:  Atraumatic, Normocephalic  EYES: EOMI, PERRLA, conjunctiva and sclera clear  NECK:  No JVD.   CHEST/LUNG: Clear to auscultation bilaterally; No wheeze  HEART: Regular rate and rhythm; No murmurs, rubs, or gallops  ABDOMEN: Soft, Nontender, Nondistended; Bowel sounds present  EXTREMITIES:  2+ Peripheral Pulses, No clubbing, cyanosis, or edema  NEUROLOGY: sedated  +IABP       Consultant(s) Notes Reviewed:  [x ] YES  [ ] NO  Care Discussed with Consultants/Other Providers [ x] YES  [ ] NO    MEDS:   MEDICATIONS  (STANDING):  basiliximab  IVPB 20 milliGRAM(s) IV Intermittent once  cefepime   IVPB 1000 milliGRAM(s) IV Intermittent every 8 hours  chlorhexidine 0.12% Liquid 15 milliLiter(s) Oral Mucosa every 12 hours  chlorhexidine 4% Liquid 1 Application(s) Topical once  DAPTOmycin IVPB 500 milliGRAM(s) IV Intermittent <User Schedule>  dextrose 50% Injectable 50 milliLiter(s) IV Push every 15 minutes  dextrose 50% Injectable 25 milliLiter(s) IV Push every 15 minutes  EPINEPHrine    Infusion 0.02 MICROgram(s)/kG/Min (7.42 mL/Hr) IV Continuous <Continuous>  gabapentin 100 milliGRAM(s) Oral every 8 hours  insulin regular Infusion 3 Unit(s)/Hr (3 mL/Hr) IV Continuous <Continuous>  lidocaine   4% Patch 3 Patch Transdermal daily  methocarbamol 500 milliGRAM(s) Oral every 8 hours  methylPREDNISolone sodium succinate Injectable 125 milliGRAM(s) IV Push every 8 hours  methylPREDNISolone sodium succinate Injectable   IV Push   metoclopramide Injectable 10 milliGRAM(s) IV Push every 8 hours  milrinone Infusion 0.25 MICROgram(s)/kG/Min (7.42 mL/Hr) IV Continuous <Continuous>  mupirocin 2% Ointment 1 Application(s) Topical two times a day  mycophenolate mofetil IVPB 1000 milliGRAM(s) IV Intermittent <User Schedule>  naloxegol 25 milliGRAM(s) Oral daily  pantoprazole  Injectable 40 milliGRAM(s) IV Push daily  polyethylene glycol 3350 17 Gram(s) Oral daily  propofol Infusion 50 MICROgram(s)/kG/Min (29.7 mL/Hr) IV Continuous <Continuous>  senna 2 Tablet(s) Oral at bedtime  simethicone 160 milliGRAM(s) Chew every 8 hours  sodium chloride 0.9%. 1000 milliLiter(s) (10 mL/Hr) IV Continuous <Continuous>  traMADol 25 milliGRAM(s) Oral every 8 hours  vancomycin    Solution 125 milliGRAM(s) Oral every 12 hours      ALLERGIES:  penicillins (Unknown)      LABS:                                     10.9   11.34 )-----------( 240      ( 25 Dec 2023 09:11 )             32.1   12-25    141  |  103  |  28<H>  ----------------------------<  247<H>  4.4   |  24  |  1.29    Ca    9.1      25 Dec 2023 09:12  Phos  4.0     12-25  Mg     2.3     12-25    TPro  6.2  /  Alb  3.7  /  TBili  1.0  /  DBili  0.4<H>  /  AST  87<H>  /  ALT  42  /  AlkPhos  55  12-25        < from: CT Abdomen and Pelvis No Cont (11.28.23 @ 03:38) >  IMPRESSION:  Mildly dilated colon and prominent but not overly dilated small bowel   with air-fluid levels. No discrete transition point. Findings are   suggestive of ileus.    Intra-aortic balloon pump with the inferior marker at the level of the   inferior mesenteric artery and the balloon overlying the origins of the   renal arteries, celiac artery, and superior mesenteric artery. Consider   repositioning. Concern for IABP positioning was discussed with LANETTE Hawthorne   on 11/28/2023 at 3:42 AM by Dr. Shepard.    < end of copied text >          < from: Colonoscopy (11.17.23 @ 10:35) >                                                                                                        Impression:          - The entire examined colon is normal on direct and retroflexion views.                       - No specimens collected.  Recommendation:      - Resume previous diet today.                       - No large polyps or masses detected, No objection from GI standpoint to                 proceed with heart transplantation/advanced heart therapies.                       - Please call back should any further questions or concerns arise.    < end of copied text >     < from: Xray Chest 1 View- PORTABLE-Urgent (11.01.23 @ 07:42) >    IMPRESSION:  Clear lungs.    ---End of Report ---        < end of copied text >  < from: TTE W or WO Ultrasound Enhancing Agent (11.01.23 @ 10:23) >  _____________________________     CONCLUSIONS:      1. Left ventricular cavity is moderately dilated. Left ventricular wall thickness is normal. Left ventricular systolic function is severely decreased with an ejection fraction of 32 % by Chinchilla's method of disks. Regional wall motion abnormalities present.   2. Multiple segmental abnormalities exist. See findings.   3. There is moderate (grade 2) left ventricular diastolic dysfunction, with indeterminant filling pressure.   4. Normal right ventricular cavity size, wall thickness, and systolic function.   5. No significant valvular disease.   6. No pericardial effusion seen.   7. Compared to the transthoracic echocardiogram performed on 1/25/2017 the areas of akinesis are unchanged but there has been a decline in LV systolic function with new areas of hypokinesis.    __________________________________________________________________    < end of copied text >  < from: TTE Limited W or WO Ultrasound Enhancing Agent (11.02.23 @ 07:41) >  __________________________     CONCLUSIONS:      1. After obtaining consent, Definity ultrasound enhancing agent was given for enhanced left ventricular opacification and improved delineation of the left ventricular endocardial borders. Left ventricular systolic function is severely decreased with a calculated ejection fraction of 22 % by the Chinchilla's biplane method of disks. There is a left ventricular thrombus.   2. Findings were discussed with Litzy BOSS on 11/2/2023 at 8.49am.   3. There is a left ventricular thrombus.    _________________________________________________________________    < end of copied text >   LD VALENCIA  58y Male  MRN:[id number]    Patient is a 58y old  Male who presents with a chief complaint of NSTEMI (01 Nov 2023 20:29)    HPI:  57yo M w/ hx HTN, CAD w/ 1 stent in 2009, ICH (2008) presenting with abn ekg. Patient presented to [facility] where he was found to have STEMI, recommended to get cath however patient did not want to get it there so it left and came here.  Patient initially had cough, congestion, fever, was placed on antibiotics on Sunday.  Started feeling nauseous and had a presyncopal event after which he presented to ED last night.  Had chest pain as well.  Chest pain is midsternal.  Not currently having chest pain.  Received 4 aspirin 30 min pta. (01 Nov 2023 15:11)      Patient seen and evaluated at bedside in CTU. interval events noted    Interval HPI: s/p heart transplant surg last night. remains intubated.       PAST MEDICAL & SURGICAL HISTORY:  HTN (hypertension)      CAD (coronary artery disease)  2009; stent      Intracranial hemorrhage  2008      Respiratory arrest  december 1st      Myocardial infarction, unspecified MI type, unspecified artery      History of coronary artery stent placement          REVIEW OF SYSTEMS:  as per hpi     VITALS:   ICU Vital Signs Last 24 Hrs  T(C): 36.7 (25 Dec 2023 12:00), Max: 37.1 (25 Dec 2023 08:45)  T(F): 98.1 (25 Dec 2023 12:00), Max: 98.8 (25 Dec 2023 08:45)  HR: 80 (25 Dec 2023 15:04) (69 - 126)  BP: 94/48 (24 Dec 2023 15:23) (94/48 - 94/48)  BP(mean): 63 (24 Dec 2023 15:23) (63 - 63)  ABP: 107/40 (25 Dec 2023 14:45) (75/39 - 127/54)  ABP(mean): 70 (25 Dec 2023 14:45) (60 - 94)  RR: 14 (25 Dec 2023 14:45) (7 - 37)  SpO2: 97% (25 Dec 2023 15:04) (93% - 100%)    O2 Parameters below as of 25 Dec 2023 12:00  Patient On (Oxygen Delivery Method): ventilator    O2 Concentration (%): 40        PHYSICAL EXAM:  GENERAL: intubated, sedated   HEAD:  Atraumatic, Normocephalic  EYES: EOMI, PERRLA, conjunctiva and sclera clear  NECK:  No JVD.   CHEST/LUNG: Clear to auscultation bilaterally; No wheeze  HEART: Regular rate and rhythm; No murmurs, rubs, or gallops  ABDOMEN: Soft, Nontender, Nondistended; Bowel sounds present  EXTREMITIES:  2+ Peripheral Pulses, No clubbing, cyanosis, or edema  NEUROLOGY: sedated  +IABP       Consultant(s) Notes Reviewed:  [x ] YES  [ ] NO  Care Discussed with Consultants/Other Providers [ x] YES  [ ] NO    MEDS:   MEDICATIONS  (STANDING):  basiliximab  IVPB 20 milliGRAM(s) IV Intermittent once  cefepime   IVPB 1000 milliGRAM(s) IV Intermittent every 8 hours  chlorhexidine 0.12% Liquid 15 milliLiter(s) Oral Mucosa every 12 hours  chlorhexidine 4% Liquid 1 Application(s) Topical once  DAPTOmycin IVPB 500 milliGRAM(s) IV Intermittent <User Schedule>  dextrose 50% Injectable 50 milliLiter(s) IV Push every 15 minutes  dextrose 50% Injectable 25 milliLiter(s) IV Push every 15 minutes  EPINEPHrine    Infusion 0.02 MICROgram(s)/kG/Min (7.42 mL/Hr) IV Continuous <Continuous>  gabapentin 100 milliGRAM(s) Oral every 8 hours  insulin regular Infusion 3 Unit(s)/Hr (3 mL/Hr) IV Continuous <Continuous>  lidocaine   4% Patch 3 Patch Transdermal daily  methocarbamol 500 milliGRAM(s) Oral every 8 hours  methylPREDNISolone sodium succinate Injectable 125 milliGRAM(s) IV Push every 8 hours  methylPREDNISolone sodium succinate Injectable   IV Push   metoclopramide Injectable 10 milliGRAM(s) IV Push every 8 hours  milrinone Infusion 0.25 MICROgram(s)/kG/Min (7.42 mL/Hr) IV Continuous <Continuous>  mupirocin 2% Ointment 1 Application(s) Topical two times a day  mycophenolate mofetil IVPB 1000 milliGRAM(s) IV Intermittent <User Schedule>  naloxegol 25 milliGRAM(s) Oral daily  pantoprazole  Injectable 40 milliGRAM(s) IV Push daily  polyethylene glycol 3350 17 Gram(s) Oral daily  propofol Infusion 50 MICROgram(s)/kG/Min (29.7 mL/Hr) IV Continuous <Continuous>  senna 2 Tablet(s) Oral at bedtime  simethicone 160 milliGRAM(s) Chew every 8 hours  sodium chloride 0.9%. 1000 milliLiter(s) (10 mL/Hr) IV Continuous <Continuous>  traMADol 25 milliGRAM(s) Oral every 8 hours  vancomycin    Solution 125 milliGRAM(s) Oral every 12 hours      ALLERGIES:  penicillins (Unknown)      LABS:                                     10.9   11.34 )-----------( 240      ( 25 Dec 2023 09:11 )             32.1   12-25    141  |  103  |  28<H>  ----------------------------<  247<H>  4.4   |  24  |  1.29    Ca    9.1      25 Dec 2023 09:12  Phos  4.0     12-25  Mg     2.3     12-25    TPro  6.2  /  Alb  3.7  /  TBili  1.0  /  DBili  0.4<H>  /  AST  87<H>  /  ALT  42  /  AlkPhos  55  12-25        < from: CT Abdomen and Pelvis No Cont (11.28.23 @ 03:38) >  IMPRESSION:  Mildly dilated colon and prominent but not overly dilated small bowel   with air-fluid levels. No discrete transition point. Findings are   suggestive of ileus.    Intra-aortic balloon pump with the inferior marker at the level of the   inferior mesenteric artery and the balloon overlying the origins of the   renal arteries, celiac artery, and superior mesenteric artery. Consider   repositioning. Concern for IABP positioning was discussed with LANETTE Hawthorne   on 11/28/2023 at 3:42 AM by Dr. Shepard.    < end of copied text >          < from: Colonoscopy (11.17.23 @ 10:35) >                                                                                                        Impression:          - The entire examined colon is normal on direct and retroflexion views.                       - No specimens collected.  Recommendation:      - Resume previous diet today.                       - No large polyps or masses detected, No objection from GI standpoint to                 proceed with heart transplantation/advanced heart therapies.                       - Please call back should any further questions or concerns arise.    < end of copied text >     < from: Xray Chest 1 View- PORTABLE-Urgent (11.01.23 @ 07:42) >    IMPRESSION:  Clear lungs.    ---End of Report ---        < end of copied text >  < from: TTE W or WO Ultrasound Enhancing Agent (11.01.23 @ 10:23) >  _____________________________     CONCLUSIONS:      1. Left ventricular cavity is moderately dilated. Left ventricular wall thickness is normal. Left ventricular systolic function is severely decreased with an ejection fraction of 32 % by Chinchilla's method of disks. Regional wall motion abnormalities present.   2. Multiple segmental abnormalities exist. See findings.   3. There is moderate (grade 2) left ventricular diastolic dysfunction, with indeterminant filling pressure.   4. Normal right ventricular cavity size, wall thickness, and systolic function.   5. No significant valvular disease.   6. No pericardial effusion seen.   7. Compared to the transthoracic echocardiogram performed on 1/25/2017 the areas of akinesis are unchanged but there has been a decline in LV systolic function with new areas of hypokinesis.    __________________________________________________________________    < end of copied text >  < from: TTE Limited W or WO Ultrasound Enhancing Agent (11.02.23 @ 07:41) >  __________________________     CONCLUSIONS:      1. After obtaining consent, Definity ultrasound enhancing agent was given for enhanced left ventricular opacification and improved delineation of the left ventricular endocardial borders. Left ventricular systolic function is severely decreased with a calculated ejection fraction of 22 % by the Chinchilla's biplane method of disks. There is a left ventricular thrombus.   2. Findings were discussed with Litzy BOSS on 11/2/2023 at 8.49am.   3. There is a left ventricular thrombus.    _________________________________________________________________    < end of copied text >

## 2023-12-25 NOTE — PROGRESS NOTE ADULT - ASSESSMENT
59 yo male h/o htn, cad s/p pci, ICH, here with NSTEMI  s/p intubation and cath. now in CCU    NSTEMI  s/p  cath  cath results noted. multi vessel dz., CTSx f/u.    pt with vtach/fib arrest again on 11/8. s/p ACLS and ROSC.   in ccu with iabp   plan for transplant as per HF and transplant team  transplant w/u ongoing.   s/p colonoscopy 11/17. normal  now listed for transplant as of 11/22 12/25: pt s/p heart transplant last night. remains intubated with iabp in CTU.   mngt as per CCU team          Advanced care planning was discussed with patient and family.  Advanced care planning forms were reviewed and discussed as appropriate.  Differential diagnosis and plan of care discussed with patient after the evaluation.   Pain assessed and judicious use of narcotics when appropriate was discussed.  Importance of Fall prevention discussed.  Counseling on Smoking and Alcohol cessation was offered when appropriate.  Counseling on Diet, exercise, and medication compliance was done.       Approx 75 minutes spent. 57 yo male h/o htn, cad s/p pci, ICH, here with NSTEMI  s/p intubation and cath. now in CCU    NSTEMI  s/p  cath  cath results noted. multi vessel dz., CTSx f/u.    pt with vtach/fib arrest again on 11/8. s/p ACLS and ROSC.   in ccu with iabp   plan for transplant as per HF and transplant team  transplant w/u ongoing.   s/p colonoscopy 11/17. normal  now listed for transplant as of 11/22 12/25: pt s/p heart transplant last night. remains intubated with iabp in CTU.   mngt as per CCU team          Advanced care planning was discussed with patient and family.  Advanced care planning forms were reviewed and discussed as appropriate.  Differential diagnosis and plan of care discussed with patient after the evaluation.   Pain assessed and judicious use of narcotics when appropriate was discussed.  Importance of Fall prevention discussed.  Counseling on Smoking and Alcohol cessation was offered when appropriate.  Counseling on Diet, exercise, and medication compliance was done.       Approx 75 minutes spent.

## 2023-12-25 NOTE — PROGRESS NOTE ADULT - ASSESSMENT
57 yo M with HFrEF (LVIDd 6.4 cm, LVEF 32%), CAD s/p PCI (2008), HTN, DMT2 (A1c 8.3%) and CVA s/p TPA (2018), recently treated for PNA who initially presented to [facility] via EMS after syncope reportedly requiring defibrilation. Treated for ACS but left AMA to come to [facility]. On arrival ECG with ST depression in lateral leads. Intubated 11/1 for respiratory failure. Found to have COVID. L/RHC 11/1revealing  of LAD and RCA, elevated filling pressures and CI of 1.5 prompting placement of IABP. Extubated 11/3. IABP was weaned to off 11/7, then the following day on 11/8, developed PMVT cardiac arrest with prompt CPR and defibrillation, started on IV Lidocaine and Amio. IABP ultimately replaced on 11/9 for worsening hypotension. TTE 11/11 revealing LV thrombus. Mild transaminitis. Now off amio.    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 AT evaluation launched 11/10, he is ABO A. He was discussed during TSC on 11/16 and was approved for listing, however was found to be Bacteremic with 11/17 Blc Cx growing mixed cultures Staph epi and Enterococcus faecalis and started on IV Vanco. Repeat cultures from 11/18 reveal NGTD and therefore was cleared by ID for listing.     GM + rods (Cutibacterium) in blood culture on 11/30 (reported on 12/4), restarted on vancomycin, and status 7, repeat cultures negative x48 hours (12/6).    Was listed Status 2, ABO A, PRA 0% (12/12). ABP changed to LFA 12/21. Tele with intermittent LBBB, asymptomatic.    A suitable donor was identified. Underwent OHT 12/25 with IABP in place. Blood Products: 2 cryo, 2 plts.  59 yo M with HFrEF (LVIDd 6.4 cm, LVEF 32%), CAD s/p PCI (2008), HTN, DMT2 (A1c 8.3%) and CVA s/p TPA (2018), recently treated for PNA who initially presented to [facility] via EMS after syncope reportedly requiring defibrilation. Treated for ACS but left AMA to come to [facility]. On arrival ECG with ST depression in lateral leads. Intubated 11/1 for respiratory failure. Found to have COVID. L/RHC 11/1revealing  of LAD and RCA, elevated filling pressures and CI of 1.5 prompting placement of IABP. Extubated 11/3. IABP was weaned to off 11/7, then the following day on 11/8, developed PMVT cardiac arrest with prompt CPR and defibrillation, started on IV Lidocaine and Amio. IABP ultimately replaced on 11/9 for worsening hypotension. TTE 11/11 revealing LV thrombus. Mild transaminitis. Now off amio.    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 AT evaluation launched 11/10, he is ABO A. He was discussed during TSC on 11/16 and was approved for listing, however was found to be Bacteremic with 11/17 Blc Cx growing mixed cultures Staph epi and Enterococcus faecalis and started on IV Vanco. Repeat cultures from 11/18 reveal NGTD and therefore was cleared by ID for listing.     GM + rods (Cutibacterium) in blood culture on 11/30 (reported on 12/4), restarted on vancomycin, and status 7, repeat cultures negative x48 hours (12/6).    Was listed Status 2, ABO A, PRA 0% (12/12). ABP changed to LFA 12/21. Tele with intermittent LBBB, asymptomatic.    A suitable donor was identified. Underwent OHT 12/25 with IABP in place. Blood Products: 2 cryo, 2 plts.  57 yo M with HFrEF (LVIDd 6.4 cm, LVEF 32%), CAD s/p PCI (2008), HTN, DMT2 (A1c 8.3%) and CVA s/p TPA (2018), recently treated for PNA who initially presented to [facility] via EMS after syncope reportedly requiring defibrilation. Treated for ACS but left AMA to come to [facility]. On arrival ECG with ST depression in lateral leads. Intubated 11/1 for respiratory failure. Found to have COVID. L/RHC 11/1revealing  of LAD and RCA, elevated filling pressures and CI of 1.5 prompting placement of IABP. Extubated 11/3. IABP was weaned to off 11/7, then the following day on 11/8, developed PMVT cardiac arrest with prompt CPR and defibrillation, started on IV Lidocaine and Amio. IABP ultimately replaced on 11/9 for worsening hypotension. TTE 11/11 revealing LV thrombus. Mild transaminitis. Now off amio.    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 AT evaluation launched 11/10, he is ABO A. He was discussed during TSC on 11/16 and was approved for listing, however was found to be Bacteremic with 11/17 Blc Cx growing mixed cultures Staph epi and Enterococcus faecalis and started on IV Vanco. Repeat cultures from 11/18 reveal NGTD and therefore was cleared by ID for listing.     GM + rods (Cutibacterium) in blood culture on 11/30 (reported on 12/4), restarted on vancomycin, and status 7, repeat cultures negative x48 hours (12/6).    Was listed Status 2, ABO A, PRA 0% (12/12). ABP changed to LFA 12/21. Tele with intermittent LBBB, asymptomatic.    A suitable donor was identified. Underwent OHT 12/25 with IABP in place. Ischemic Time: 253min. Blood Products: 2 cryo, 2 plts.  59 yo M with HFrEF (LVIDd 6.4 cm, LVEF 32%), CAD s/p PCI (2008), HTN, DMT2 (A1c 8.3%) and CVA s/p TPA (2018), recently treated for PNA who initially presented to [facility] via EMS after syncope reportedly requiring defibrilation. Treated for ACS but left AMA to come to [facility]. On arrival ECG with ST depression in lateral leads. Intubated 11/1 for respiratory failure. Found to have COVID. L/RHC 11/1revealing  of LAD and RCA, elevated filling pressures and CI of 1.5 prompting placement of IABP. Extubated 11/3. IABP was weaned to off 11/7, then the following day on 11/8, developed PMVT cardiac arrest with prompt CPR and defibrillation, started on IV Lidocaine and Amio. IABP ultimately replaced on 11/9 for worsening hypotension. TTE 11/11 revealing LV thrombus. Mild transaminitis. Now off amio.    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 AT evaluation launched 11/10, he is ABO A. He was discussed during TSC on 11/16 and was approved for listing, however was found to be Bacteremic with 11/17 Blc Cx growing mixed cultures Staph epi and Enterococcus faecalis and started on IV Vanco. Repeat cultures from 11/18 reveal NGTD and therefore was cleared by ID for listing.     GM + rods (Cutibacterium) in blood culture on 11/30 (reported on 12/4), restarted on vancomycin, and status 7, repeat cultures negative x48 hours (12/6).    Was listed Status 2, ABO A, PRA 0% (12/12). ABP changed to LFA 12/21. Tele with intermittent LBBB, asymptomatic.    A suitable donor was identified. Underwent OHT 12/25 with IABP in place. Ischemic Time: 253min. Blood Products: 2 cryo, 2 plts.

## 2023-12-25 NOTE — BRIEF OPERATIVE NOTE - BRIEF OP NOTE DRAINS
Substernal mediastinal chest tube  Left and right mediastinal shiva drains  Bilateral pleural shiva drains

## 2023-12-26 DIAGNOSIS — E11.65 TYPE 2 DIABETES MELLITUS WITH HYPERGLYCEMIA: ICD-10-CM

## 2023-12-26 DIAGNOSIS — R73.9 HYPERGLYCEMIA, UNSPECIFIED: ICD-10-CM

## 2023-12-26 DIAGNOSIS — I10 ESSENTIAL (PRIMARY) HYPERTENSION: ICD-10-CM

## 2023-12-26 DIAGNOSIS — E78.5 HYPERLIPIDEMIA, UNSPECIFIED: ICD-10-CM

## 2023-12-26 LAB
ALBUMIN SERPL ELPH-MCNC: 4.3 G/DL — SIGNIFICANT CHANGE UP (ref 3.3–5)
ALBUMIN SERPL ELPH-MCNC: 4.3 G/DL — SIGNIFICANT CHANGE UP (ref 3.3–5)
ALP SERPL-CCNC: 44 U/L — SIGNIFICANT CHANGE UP (ref 40–120)
ALP SERPL-CCNC: 44 U/L — SIGNIFICANT CHANGE UP (ref 40–120)
ALT FLD-CCNC: 33 U/L — SIGNIFICANT CHANGE UP (ref 10–45)
ALT FLD-CCNC: 33 U/L — SIGNIFICANT CHANGE UP (ref 10–45)
ANION GAP SERPL CALC-SCNC: 15 MMOL/L — SIGNIFICANT CHANGE UP (ref 5–17)
ANION GAP SERPL CALC-SCNC: 15 MMOL/L — SIGNIFICANT CHANGE UP (ref 5–17)
APTT BLD: 27.1 SEC — SIGNIFICANT CHANGE UP (ref 24.5–35.6)
APTT BLD: 27.1 SEC — SIGNIFICANT CHANGE UP (ref 24.5–35.6)
AST SERPL-CCNC: 72 U/L — HIGH (ref 10–40)
AST SERPL-CCNC: 72 U/L — HIGH (ref 10–40)
BASE EXCESS BLDMV CALC-SCNC: -0.5 MMOL/L — SIGNIFICANT CHANGE UP (ref -3–3)
BASE EXCESS BLDMV CALC-SCNC: -0.5 MMOL/L — SIGNIFICANT CHANGE UP (ref -3–3)
BASE EXCESS BLDMV CALC-SCNC: -2.6 MMOL/L — SIGNIFICANT CHANGE UP (ref -3–3)
BASE EXCESS BLDMV CALC-SCNC: -3.9 MMOL/L — LOW (ref -3–3)
BASE EXCESS BLDMV CALC-SCNC: -3.9 MMOL/L — LOW (ref -3–3)
BASE EXCESS BLDMV CALC-SCNC: -4.4 MMOL/L — LOW (ref -3–3)
BASE EXCESS BLDMV CALC-SCNC: -4.4 MMOL/L — LOW (ref -3–3)
BASE EXCESS BLDMV CALC-SCNC: -5.3 MMOL/L — LOW (ref -3–3)
BASE EXCESS BLDMV CALC-SCNC: -5.3 MMOL/L — LOW (ref -3–3)
BASE EXCESS BLDMV CALC-SCNC: -5.7 MMOL/L — LOW (ref -3–3)
BASE EXCESS BLDMV CALC-SCNC: -5.7 MMOL/L — LOW (ref -3–3)
BASE EXCESS BLDV CALC-SCNC: -8.9 MMOL/L — LOW (ref -2–3)
BASE EXCESS BLDV CALC-SCNC: -8.9 MMOL/L — LOW (ref -2–3)
BASOPHILS # BLD AUTO: 0.01 K/UL — SIGNIFICANT CHANGE UP (ref 0–0.2)
BASOPHILS # BLD AUTO: 0.01 K/UL — SIGNIFICANT CHANGE UP (ref 0–0.2)
BASOPHILS NFR BLD AUTO: 0.1 % — SIGNIFICANT CHANGE UP (ref 0–2)
BASOPHILS NFR BLD AUTO: 0.1 % — SIGNIFICANT CHANGE UP (ref 0–2)
BILIRUB SERPL-MCNC: 0.4 MG/DL — SIGNIFICANT CHANGE UP (ref 0.2–1.2)
BILIRUB SERPL-MCNC: 0.4 MG/DL — SIGNIFICANT CHANGE UP (ref 0.2–1.2)
BUN SERPL-MCNC: 26 MG/DL — HIGH (ref 7–23)
BUN SERPL-MCNC: 26 MG/DL — HIGH (ref 7–23)
CALCIUM SERPL-MCNC: 10.3 MG/DL — SIGNIFICANT CHANGE UP (ref 8.4–10.5)
CALCIUM SERPL-MCNC: 10.3 MG/DL — SIGNIFICANT CHANGE UP (ref 8.4–10.5)
CHLORIDE SERPL-SCNC: 105 MMOL/L — SIGNIFICANT CHANGE UP (ref 96–108)
CHLORIDE SERPL-SCNC: 105 MMOL/L — SIGNIFICANT CHANGE UP (ref 96–108)
CO2 BLDMV-SCNC: 22 MMOL/L — SIGNIFICANT CHANGE UP (ref 21–29)
CO2 BLDMV-SCNC: 23 MMOL/L — SIGNIFICANT CHANGE UP (ref 21–29)
CO2 BLDMV-SCNC: 24 MMOL/L — SIGNIFICANT CHANGE UP (ref 21–29)
CO2 BLDMV-SCNC: 24 MMOL/L — SIGNIFICANT CHANGE UP (ref 21–29)
CO2 BLDMV-SCNC: 25 MMOL/L — SIGNIFICANT CHANGE UP (ref 21–29)
CO2 BLDV-SCNC: 18 MMOL/L — LOW (ref 22–26)
CO2 BLDV-SCNC: 18 MMOL/L — LOW (ref 22–26)
CO2 SERPL-SCNC: 20 MMOL/L — LOW (ref 22–31)
CO2 SERPL-SCNC: 20 MMOL/L — LOW (ref 22–31)
CREAT SERPL-MCNC: 1.41 MG/DL — HIGH (ref 0.5–1.3)
CREAT SERPL-MCNC: 1.41 MG/DL — HIGH (ref 0.5–1.3)
EGFR: 57 ML/MIN/1.73M2 — LOW
EGFR: 57 ML/MIN/1.73M2 — LOW
EOSINOPHIL # BLD AUTO: 0 K/UL — SIGNIFICANT CHANGE UP (ref 0–0.5)
EOSINOPHIL # BLD AUTO: 0 K/UL — SIGNIFICANT CHANGE UP (ref 0–0.5)
EOSINOPHIL NFR BLD AUTO: 0 % — SIGNIFICANT CHANGE UP (ref 0–6)
EOSINOPHIL NFR BLD AUTO: 0 % — SIGNIFICANT CHANGE UP (ref 0–6)
FIBRINOGEN PPP-MCNC: 512 MG/DL — HIGH (ref 200–445)
FIBRINOGEN PPP-MCNC: 512 MG/DL — HIGH (ref 200–445)
GAS PNL BLDA: SIGNIFICANT CHANGE UP
GAS PNL BLDMV: SIGNIFICANT CHANGE UP
GAS PNL BLDV: SIGNIFICANT CHANGE UP
GAS PNL BLDV: SIGNIFICANT CHANGE UP
GLUCOSE BLDC GLUCOMTR-MCNC: 101 MG/DL — HIGH (ref 70–99)
GLUCOSE BLDC GLUCOMTR-MCNC: 101 MG/DL — HIGH (ref 70–99)
GLUCOSE BLDC GLUCOMTR-MCNC: 103 MG/DL — HIGH (ref 70–99)
GLUCOSE BLDC GLUCOMTR-MCNC: 103 MG/DL — HIGH (ref 70–99)
GLUCOSE BLDC GLUCOMTR-MCNC: 104 MG/DL — HIGH (ref 70–99)
GLUCOSE BLDC GLUCOMTR-MCNC: 104 MG/DL — HIGH (ref 70–99)
GLUCOSE BLDC GLUCOMTR-MCNC: 111 MG/DL — HIGH (ref 70–99)
GLUCOSE BLDC GLUCOMTR-MCNC: 111 MG/DL — HIGH (ref 70–99)
GLUCOSE BLDC GLUCOMTR-MCNC: 114 MG/DL — HIGH (ref 70–99)
GLUCOSE BLDC GLUCOMTR-MCNC: 114 MG/DL — HIGH (ref 70–99)
GLUCOSE BLDC GLUCOMTR-MCNC: 118 MG/DL — HIGH (ref 70–99)
GLUCOSE BLDC GLUCOMTR-MCNC: 118 MG/DL — HIGH (ref 70–99)
GLUCOSE BLDC GLUCOMTR-MCNC: 122 MG/DL — HIGH (ref 70–99)
GLUCOSE BLDC GLUCOMTR-MCNC: 122 MG/DL — HIGH (ref 70–99)
GLUCOSE BLDC GLUCOMTR-MCNC: 129 MG/DL — HIGH (ref 70–99)
GLUCOSE BLDC GLUCOMTR-MCNC: 129 MG/DL — HIGH (ref 70–99)
GLUCOSE BLDC GLUCOMTR-MCNC: 143 MG/DL — HIGH (ref 70–99)
GLUCOSE BLDC GLUCOMTR-MCNC: 143 MG/DL — HIGH (ref 70–99)
GLUCOSE BLDC GLUCOMTR-MCNC: 148 MG/DL — HIGH (ref 70–99)
GLUCOSE BLDC GLUCOMTR-MCNC: 148 MG/DL — HIGH (ref 70–99)
GLUCOSE BLDC GLUCOMTR-MCNC: 157 MG/DL — HIGH (ref 70–99)
GLUCOSE BLDC GLUCOMTR-MCNC: 157 MG/DL — HIGH (ref 70–99)
GLUCOSE BLDC GLUCOMTR-MCNC: 160 MG/DL — HIGH (ref 70–99)
GLUCOSE BLDC GLUCOMTR-MCNC: 168 MG/DL — HIGH (ref 70–99)
GLUCOSE BLDC GLUCOMTR-MCNC: 168 MG/DL — HIGH (ref 70–99)
GLUCOSE BLDC GLUCOMTR-MCNC: 170 MG/DL — HIGH (ref 70–99)
GLUCOSE BLDC GLUCOMTR-MCNC: 170 MG/DL — HIGH (ref 70–99)
GLUCOSE BLDC GLUCOMTR-MCNC: 172 MG/DL — HIGH (ref 70–99)
GLUCOSE BLDC GLUCOMTR-MCNC: 172 MG/DL — HIGH (ref 70–99)
GLUCOSE BLDC GLUCOMTR-MCNC: 179 MG/DL — HIGH (ref 70–99)
GLUCOSE BLDC GLUCOMTR-MCNC: 179 MG/DL — HIGH (ref 70–99)
GLUCOSE BLDC GLUCOMTR-MCNC: 185 MG/DL — HIGH (ref 70–99)
GLUCOSE BLDC GLUCOMTR-MCNC: 185 MG/DL — HIGH (ref 70–99)
GLUCOSE BLDC GLUCOMTR-MCNC: 188 MG/DL — HIGH (ref 70–99)
GLUCOSE BLDC GLUCOMTR-MCNC: 188 MG/DL — HIGH (ref 70–99)
GLUCOSE BLDC GLUCOMTR-MCNC: 191 MG/DL — HIGH (ref 70–99)
GLUCOSE BLDC GLUCOMTR-MCNC: 191 MG/DL — HIGH (ref 70–99)
GLUCOSE BLDC GLUCOMTR-MCNC: 205 MG/DL — HIGH (ref 70–99)
GLUCOSE BLDC GLUCOMTR-MCNC: 205 MG/DL — HIGH (ref 70–99)
GLUCOSE BLDC GLUCOMTR-MCNC: 207 MG/DL — HIGH (ref 70–99)
GLUCOSE BLDC GLUCOMTR-MCNC: 207 MG/DL — HIGH (ref 70–99)
GLUCOSE BLDC GLUCOMTR-MCNC: 95 MG/DL — SIGNIFICANT CHANGE UP (ref 70–99)
GLUCOSE BLDC GLUCOMTR-MCNC: 95 MG/DL — SIGNIFICANT CHANGE UP (ref 70–99)
GLUCOSE BLDC GLUCOMTR-MCNC: 97 MG/DL — SIGNIFICANT CHANGE UP (ref 70–99)
GLUCOSE BLDC GLUCOMTR-MCNC: 97 MG/DL — SIGNIFICANT CHANGE UP (ref 70–99)
GLUCOSE SERPL-MCNC: 117 MG/DL — HIGH (ref 70–99)
GLUCOSE SERPL-MCNC: 117 MG/DL — HIGH (ref 70–99)
HCO3 BLDMV-SCNC: 21 MMOL/L — SIGNIFICANT CHANGE UP (ref 20–28)
HCO3 BLDMV-SCNC: 22 MMOL/L — SIGNIFICANT CHANGE UP (ref 20–28)
HCO3 BLDMV-SCNC: 22 MMOL/L — SIGNIFICANT CHANGE UP (ref 20–28)
HCO3 BLDMV-SCNC: 23 MMOL/L — SIGNIFICANT CHANGE UP (ref 20–28)
HCO3 BLDMV-SCNC: 23 MMOL/L — SIGNIFICANT CHANGE UP (ref 20–28)
HCO3 BLDMV-SCNC: 24 MMOL/L — SIGNIFICANT CHANGE UP (ref 20–28)
HCO3 BLDV-SCNC: 17 MMOL/L — LOW (ref 22–29)
HCO3 BLDV-SCNC: 17 MMOL/L — LOW (ref 22–29)
HCT VFR BLD CALC: 26.6 % — LOW (ref 39–50)
HCT VFR BLD CALC: 26.6 % — LOW (ref 39–50)
HGB BLD-MCNC: 8.8 G/DL — LOW (ref 13–17)
HGB BLD-MCNC: 8.8 G/DL — LOW (ref 13–17)
HOROWITZ INDEX BLDMV+IHG-RTO: 40 — SIGNIFICANT CHANGE UP
HOROWITZ INDEX BLDMV+IHG-RTO: 50 — SIGNIFICANT CHANGE UP
IMM GRANULOCYTES NFR BLD AUTO: 0.5 % — SIGNIFICANT CHANGE UP (ref 0–0.9)
IMM GRANULOCYTES NFR BLD AUTO: 0.5 % — SIGNIFICANT CHANGE UP (ref 0–0.9)
INR BLD: 1.22 RATIO — HIGH (ref 0.85–1.18)
INR BLD: 1.22 RATIO — HIGH (ref 0.85–1.18)
LYMPHOCYTES # BLD AUTO: 0.72 K/UL — LOW (ref 1–3.3)
LYMPHOCYTES # BLD AUTO: 0.72 K/UL — LOW (ref 1–3.3)
LYMPHOCYTES # BLD AUTO: 6.5 % — LOW (ref 13–44)
LYMPHOCYTES # BLD AUTO: 6.5 % — LOW (ref 13–44)
MAGNESIUM SERPL-MCNC: 2.3 MG/DL — SIGNIFICANT CHANGE UP (ref 1.6–2.6)
MAGNESIUM SERPL-MCNC: 2.3 MG/DL — SIGNIFICANT CHANGE UP (ref 1.6–2.6)
MCHC RBC-ENTMCNC: 29.4 PG — SIGNIFICANT CHANGE UP (ref 27–34)
MCHC RBC-ENTMCNC: 29.4 PG — SIGNIFICANT CHANGE UP (ref 27–34)
MCHC RBC-ENTMCNC: 33.1 GM/DL — SIGNIFICANT CHANGE UP (ref 32–36)
MCHC RBC-ENTMCNC: 33.1 GM/DL — SIGNIFICANT CHANGE UP (ref 32–36)
MCV RBC AUTO: 89 FL — SIGNIFICANT CHANGE UP (ref 80–100)
MCV RBC AUTO: 89 FL — SIGNIFICANT CHANGE UP (ref 80–100)
MONOCYTES # BLD AUTO: 0.54 K/UL — SIGNIFICANT CHANGE UP (ref 0–0.9)
MONOCYTES # BLD AUTO: 0.54 K/UL — SIGNIFICANT CHANGE UP (ref 0–0.9)
MONOCYTES NFR BLD AUTO: 4.9 % — SIGNIFICANT CHANGE UP (ref 2–14)
MONOCYTES NFR BLD AUTO: 4.9 % — SIGNIFICANT CHANGE UP (ref 2–14)
NEUTROPHILS # BLD AUTO: 9.78 K/UL — HIGH (ref 1.8–7.4)
NEUTROPHILS # BLD AUTO: 9.78 K/UL — HIGH (ref 1.8–7.4)
NEUTROPHILS NFR BLD AUTO: 88 % — HIGH (ref 43–77)
NEUTROPHILS NFR BLD AUTO: 88 % — HIGH (ref 43–77)
NRBC # BLD: 0 /100 WBCS — SIGNIFICANT CHANGE UP (ref 0–0)
NRBC # BLD: 0 /100 WBCS — SIGNIFICANT CHANGE UP (ref 0–0)
O2 CT VFR BLD CALC: 43 MMHG — SIGNIFICANT CHANGE UP (ref 30–65)
O2 CT VFR BLD CALC: 43 MMHG — SIGNIFICANT CHANGE UP (ref 30–65)
O2 CT VFR BLD CALC: 45 MMHG — SIGNIFICANT CHANGE UP (ref 30–65)
O2 CT VFR BLD CALC: 45 MMHG — SIGNIFICANT CHANGE UP (ref 30–65)
O2 CT VFR BLD CALC: 46 MMHG — SIGNIFICANT CHANGE UP (ref 30–65)
O2 CT VFR BLD CALC: 46 MMHG — SIGNIFICANT CHANGE UP (ref 30–65)
O2 CT VFR BLD CALC: 48 MMHG — SIGNIFICANT CHANGE UP (ref 30–65)
O2 CT VFR BLD CALC: 48 MMHG — SIGNIFICANT CHANGE UP (ref 30–65)
O2 CT VFR BLD CALC: 49 MMHG — SIGNIFICANT CHANGE UP (ref 30–65)
O2 CT VFR BLD CALC: 49 MMHG — SIGNIFICANT CHANGE UP (ref 30–65)
O2 CT VFR BLD CALC: 51 MMHG — SIGNIFICANT CHANGE UP (ref 30–65)
O2 CT VFR BLD CALC: 51 MMHG — SIGNIFICANT CHANGE UP (ref 30–65)
O2 CT VFR BLD CALC: 55 MMHG — SIGNIFICANT CHANGE UP (ref 30–65)
O2 CT VFR BLD CALC: 55 MMHG — SIGNIFICANT CHANGE UP (ref 30–65)
PCO2 BLDMV: 39 MMHG — SIGNIFICANT CHANGE UP (ref 30–65)
PCO2 BLDMV: 40 MMHG — SIGNIFICANT CHANGE UP (ref 30–65)
PCO2 BLDMV: 40 MMHG — SIGNIFICANT CHANGE UP (ref 30–65)
PCO2 BLDMV: 43 MMHG — SIGNIFICANT CHANGE UP (ref 30–65)
PCO2 BLDMV: 44 MMHG — SIGNIFICANT CHANGE UP (ref 30–65)
PCO2 BLDMV: 44 MMHG — SIGNIFICANT CHANGE UP (ref 30–65)
PCO2 BLDMV: 46 MMHG — SIGNIFICANT CHANGE UP (ref 30–65)
PCO2 BLDMV: 46 MMHG — SIGNIFICANT CHANGE UP (ref 30–65)
PCO2 BLDV: 35 MMHG — LOW (ref 42–55)
PCO2 BLDV: 35 MMHG — LOW (ref 42–55)
PH BLDMV: 7.29 — LOW (ref 7.32–7.45)
PH BLDMV: 7.32 — SIGNIFICANT CHANGE UP (ref 7.32–7.45)
PH BLDMV: 7.32 — SIGNIFICANT CHANGE UP (ref 7.32–7.45)
PH BLDMV: 7.34 — SIGNIFICANT CHANGE UP (ref 7.32–7.45)
PH BLDMV: 7.4 — SIGNIFICANT CHANGE UP (ref 7.32–7.45)
PH BLDMV: 7.4 — SIGNIFICANT CHANGE UP (ref 7.32–7.45)
PH BLDV: 7.29 — LOW (ref 7.32–7.43)
PH BLDV: 7.29 — LOW (ref 7.32–7.43)
PHOSPHATE SERPL-MCNC: 3.1 MG/DL — SIGNIFICANT CHANGE UP (ref 2.5–4.5)
PHOSPHATE SERPL-MCNC: 3.1 MG/DL — SIGNIFICANT CHANGE UP (ref 2.5–4.5)
PLATELET # BLD AUTO: 198 K/UL — SIGNIFICANT CHANGE UP (ref 150–400)
PLATELET # BLD AUTO: 198 K/UL — SIGNIFICANT CHANGE UP (ref 150–400)
PO2 BLDV: 50 MMHG — HIGH (ref 25–45)
PO2 BLDV: 50 MMHG — HIGH (ref 25–45)
POTASSIUM SERPL-MCNC: 3.7 MMOL/L — SIGNIFICANT CHANGE UP (ref 3.5–5.3)
POTASSIUM SERPL-MCNC: 3.7 MMOL/L — SIGNIFICANT CHANGE UP (ref 3.5–5.3)
POTASSIUM SERPL-SCNC: 3.7 MMOL/L — SIGNIFICANT CHANGE UP (ref 3.5–5.3)
POTASSIUM SERPL-SCNC: 3.7 MMOL/L — SIGNIFICANT CHANGE UP (ref 3.5–5.3)
PROT SERPL-MCNC: 6.6 G/DL — SIGNIFICANT CHANGE UP (ref 6–8.3)
PROT SERPL-MCNC: 6.6 G/DL — SIGNIFICANT CHANGE UP (ref 6–8.3)
PROTHROM AB SERPL-ACNC: 13.3 SEC — HIGH (ref 9.5–13)
PROTHROM AB SERPL-ACNC: 13.3 SEC — HIGH (ref 9.5–13)
RBC # BLD: 2.99 M/UL — LOW (ref 4.2–5.8)
RBC # BLD: 2.99 M/UL — LOW (ref 4.2–5.8)
RBC # FLD: 15.2 % — HIGH (ref 10.3–14.5)
RBC # FLD: 15.2 % — HIGH (ref 10.3–14.5)
SAO2 % BLDMV: 69.5 — SIGNIFICANT CHANGE UP (ref 60–90)
SAO2 % BLDMV: 69.5 — SIGNIFICANT CHANGE UP (ref 60–90)
SAO2 % BLDMV: 74.5 — SIGNIFICANT CHANGE UP (ref 60–90)
SAO2 % BLDMV: 74.5 — SIGNIFICANT CHANGE UP (ref 60–90)
SAO2 % BLDMV: 76.3 — SIGNIFICANT CHANGE UP (ref 60–90)
SAO2 % BLDMV: 76.3 — SIGNIFICANT CHANGE UP (ref 60–90)
SAO2 % BLDMV: 78.6 — SIGNIFICANT CHANGE UP (ref 60–90)
SAO2 % BLDMV: 78.6 — SIGNIFICANT CHANGE UP (ref 60–90)
SAO2 % BLDMV: 80.2 — SIGNIFICANT CHANGE UP (ref 60–90)
SAO2 % BLDMV: 80.2 — SIGNIFICANT CHANGE UP (ref 60–90)
SAO2 % BLDMV: 82.1 — SIGNIFICANT CHANGE UP (ref 60–90)
SAO2 % BLDMV: 82.1 — SIGNIFICANT CHANGE UP (ref 60–90)
SAO2 % BLDMV: 84.5 — SIGNIFICANT CHANGE UP (ref 60–90)
SAO2 % BLDMV: 84.5 — SIGNIFICANT CHANGE UP (ref 60–90)
SAO2 % BLDV: 79.4 % — SIGNIFICANT CHANGE UP (ref 67–88)
SAO2 % BLDV: 79.4 % — SIGNIFICANT CHANGE UP (ref 67–88)
SODIUM SERPL-SCNC: 140 MMOL/L — SIGNIFICANT CHANGE UP (ref 135–145)
SODIUM SERPL-SCNC: 140 MMOL/L — SIGNIFICANT CHANGE UP (ref 135–145)
WBC # BLD: 11.11 K/UL — HIGH (ref 3.8–10.5)
WBC # BLD: 11.11 K/UL — HIGH (ref 3.8–10.5)
WBC # FLD AUTO: 11.11 K/UL — HIGH (ref 3.8–10.5)
WBC # FLD AUTO: 11.11 K/UL — HIGH (ref 3.8–10.5)

## 2023-12-26 PROCEDURE — 99233 SBSQ HOSP IP/OBS HIGH 50: CPT

## 2023-12-26 PROCEDURE — 99222 1ST HOSP IP/OBS MODERATE 55: CPT

## 2023-12-26 PROCEDURE — 99291 CRITICAL CARE FIRST HOUR: CPT

## 2023-12-26 PROCEDURE — 99292 CRITICAL CARE ADDL 30 MIN: CPT

## 2023-12-26 PROCEDURE — 71045 X-RAY EXAM CHEST 1 VIEW: CPT | Mod: 26

## 2023-12-26 RX ORDER — TACROLIMUS 5 MG/1
0.5 CAPSULE ORAL
Refills: 0 | Status: DISCONTINUED | OUTPATIENT
Start: 2023-12-26 | End: 2023-12-26

## 2023-12-26 RX ORDER — HEPARIN SODIUM 5000 [USP'U]/ML
5000 INJECTION INTRAVENOUS; SUBCUTANEOUS EVERY 8 HOURS
Refills: 0 | Status: DISCONTINUED | OUTPATIENT
Start: 2023-12-26 | End: 2024-01-01

## 2023-12-26 RX ORDER — ALBUMIN HUMAN 25 %
250 VIAL (ML) INTRAVENOUS ONCE
Refills: 0 | Status: COMPLETED | OUTPATIENT
Start: 2023-12-26 | End: 2023-12-26

## 2023-12-26 RX ORDER — BUMETANIDE 0.25 MG/ML
1 INJECTION INTRAMUSCULAR; INTRAVENOUS ONCE
Refills: 0 | Status: COMPLETED | OUTPATIENT
Start: 2023-12-26 | End: 2023-12-26

## 2023-12-26 RX ORDER — FUROSEMIDE 40 MG
20 TABLET ORAL ONCE
Refills: 0 | Status: DISCONTINUED | OUTPATIENT
Start: 2023-12-26 | End: 2023-12-26

## 2023-12-26 RX ORDER — HYDROMORPHONE HYDROCHLORIDE 2 MG/ML
0.5 INJECTION INTRAMUSCULAR; INTRAVENOUS; SUBCUTANEOUS ONCE
Refills: 0 | Status: DISCONTINUED | OUTPATIENT
Start: 2023-12-26 | End: 2023-12-26

## 2023-12-26 RX ORDER — CHLORHEXIDINE GLUCONATE 213 G/1000ML
1 SOLUTION TOPICAL
Refills: 0 | Status: DISCONTINUED | OUTPATIENT
Start: 2023-12-26 | End: 2024-01-09

## 2023-12-26 RX ORDER — ACETAMINOPHEN 500 MG
1000 TABLET ORAL ONCE
Refills: 0 | Status: COMPLETED | OUTPATIENT
Start: 2023-12-26 | End: 2023-12-26

## 2023-12-26 RX ORDER — TIOTROPIUM BROMIDE 18 UG/1
2 CAPSULE ORAL; RESPIRATORY (INHALATION) DAILY
Refills: 0 | Status: DISCONTINUED | OUTPATIENT
Start: 2023-12-26 | End: 2023-12-27

## 2023-12-26 RX ORDER — FUROSEMIDE 40 MG
20 TABLET ORAL ONCE
Refills: 0 | Status: COMPLETED | OUTPATIENT
Start: 2023-12-26 | End: 2023-12-26

## 2023-12-26 RX ORDER — TACROLIMUS 5 MG/1
0.5 CAPSULE ORAL
Refills: 0 | Status: DISCONTINUED | OUTPATIENT
Start: 2023-12-26 | End: 2023-12-27

## 2023-12-26 RX ORDER — NYSTATIN 500MM UNIT
500000 POWDER (EA) MISCELLANEOUS
Refills: 0 | Status: DISCONTINUED | OUTPATIENT
Start: 2023-12-26 | End: 2024-01-01

## 2023-12-26 RX ORDER — POTASSIUM CHLORIDE 20 MEQ
10 PACKET (EA) ORAL DAILY
Refills: 0 | Status: COMPLETED | OUTPATIENT
Start: 2023-12-26 | End: 2023-12-26

## 2023-12-26 RX ADMIN — METHOCARBAMOL 500 MILLIGRAM(S): 500 TABLET, FILM COATED ORAL at 21:46

## 2023-12-26 RX ADMIN — INSULIN HUMAN 3 UNIT(S)/HR: 100 INJECTION, SOLUTION SUBCUTANEOUS at 08:00

## 2023-12-26 RX ADMIN — MILRINONE LACTATE 7.42 MICROGRAM(S)/KG/MIN: 1 INJECTION, SOLUTION INTRAVENOUS at 08:00

## 2023-12-26 RX ADMIN — Medication 125 MILLIGRAM(S): at 05:11

## 2023-12-26 RX ADMIN — LIDOCAINE 3 PATCH: 4 CREAM TOPICAL at 20:27

## 2023-12-26 RX ADMIN — CEFEPIME 100 MILLIGRAM(S): 1 INJECTION, POWDER, FOR SOLUTION INTRAMUSCULAR; INTRAVENOUS at 23:05

## 2023-12-26 RX ADMIN — Medication 10 MILLIGRAM(S): at 13:00

## 2023-12-26 RX ADMIN — MYCOPHENOLATE MOFETIL 83.34 MILLIGRAM(S): 250 CAPSULE ORAL at 21:51

## 2023-12-26 RX ADMIN — BUMETANIDE 1 MILLIGRAM(S): 0.25 INJECTION INTRAMUSCULAR; INTRAVENOUS at 22:29

## 2023-12-26 RX ADMIN — Medication 40 MILLIGRAM(S): at 17:27

## 2023-12-26 RX ADMIN — HEPARIN SODIUM 5000 UNIT(S): 5000 INJECTION INTRAVENOUS; SUBCUTANEOUS at 22:01

## 2023-12-26 RX ADMIN — Medication 125 MILLIGRAM(S): at 17:27

## 2023-12-26 RX ADMIN — MYCOPHENOLATE MOFETIL 83.34 MILLIGRAM(S): 250 CAPSULE ORAL at 08:00

## 2023-12-26 RX ADMIN — Medication 10 MILLIGRAM(S): at 21:46

## 2023-12-26 RX ADMIN — DEXMEDETOMIDINE HYDROCHLORIDE IN 0.9% SODIUM CHLORIDE 12.4 MICROGRAM(S)/KG/HR: 4 INJECTION INTRAVENOUS at 08:00

## 2023-12-26 RX ADMIN — NICARDIPINE HYDROCHLORIDE 15 MG/HR: 30 CAPSULE, EXTENDED RELEASE ORAL at 08:00

## 2023-12-26 RX ADMIN — Medication 125 MILLILITER(S): at 01:16

## 2023-12-26 RX ADMIN — TRAMADOL HYDROCHLORIDE 25 MILLIGRAM(S): 50 TABLET ORAL at 21:46

## 2023-12-26 RX ADMIN — Medication 20 MILLIGRAM(S): at 12:32

## 2023-12-26 RX ADMIN — CEFEPIME 100 MILLIGRAM(S): 1 INJECTION, POWDER, FOR SOLUTION INTRAMUSCULAR; INTRAVENOUS at 15:23

## 2023-12-26 RX ADMIN — MUPIROCIN 1 APPLICATION(S): 20 OINTMENT TOPICAL at 05:12

## 2023-12-26 RX ADMIN — PANTOPRAZOLE SODIUM 40 MILLIGRAM(S): 20 TABLET, DELAYED RELEASE ORAL at 13:23

## 2023-12-26 RX ADMIN — GABAPENTIN 100 MILLIGRAM(S): 400 CAPSULE ORAL at 05:11

## 2023-12-26 RX ADMIN — HYDROMORPHONE HYDROCHLORIDE 0.5 MILLIGRAM(S): 2 INJECTION INTRAMUSCULAR; INTRAVENOUS; SUBCUTANEOUS at 11:15

## 2023-12-26 RX ADMIN — GABAPENTIN 100 MILLIGRAM(S): 400 CAPSULE ORAL at 15:22

## 2023-12-26 RX ADMIN — CEFEPIME 100 MILLIGRAM(S): 1 INJECTION, POWDER, FOR SOLUTION INTRAMUSCULAR; INTRAVENOUS at 07:56

## 2023-12-26 RX ADMIN — Medication 500000 UNIT(S): at 15:23

## 2023-12-26 RX ADMIN — EPINEPHRINE 7.42 MICROGRAM(S)/KG/MIN: 0.3 INJECTION INTRAMUSCULAR; SUBCUTANEOUS at 08:00

## 2023-12-26 RX ADMIN — SIMETHICONE 160 MILLIGRAM(S): 80 TABLET, CHEWABLE ORAL at 16:17

## 2023-12-26 RX ADMIN — CHLORHEXIDINE GLUCONATE 15 MILLILITER(S): 213 SOLUTION TOPICAL at 05:12

## 2023-12-26 RX ADMIN — TRAMADOL HYDROCHLORIDE 25 MILLIGRAM(S): 50 TABLET ORAL at 05:11

## 2023-12-26 RX ADMIN — TACROLIMUS 0.5 MILLIGRAM(S): 5 CAPSULE ORAL at 20:45

## 2023-12-26 RX ADMIN — DAPTOMYCIN 120 MILLIGRAM(S): 500 INJECTION, POWDER, LYOPHILIZED, FOR SOLUTION INTRAVENOUS at 22:01

## 2023-12-26 RX ADMIN — MUPIROCIN 1 APPLICATION(S): 20 OINTMENT TOPICAL at 17:27

## 2023-12-26 RX ADMIN — SIMETHICONE 160 MILLIGRAM(S): 80 TABLET, CHEWABLE ORAL at 22:01

## 2023-12-26 RX ADMIN — Medication 400 MILLIGRAM(S): at 05:00

## 2023-12-26 RX ADMIN — TRAMADOL HYDROCHLORIDE 25 MILLIGRAM(S): 50 TABLET ORAL at 16:17

## 2023-12-26 RX ADMIN — Medication 1000 MILLIGRAM(S): at 05:15

## 2023-12-26 RX ADMIN — LIDOCAINE 3 PATCH: 4 CREAM TOPICAL at 11:53

## 2023-12-26 RX ADMIN — Medication 50 MILLIEQUIVALENT(S): at 12:38

## 2023-12-26 RX ADMIN — METHOCARBAMOL 500 MILLIGRAM(S): 500 TABLET, FILM COATED ORAL at 05:12

## 2023-12-26 RX ADMIN — SODIUM CHLORIDE 10 MILLILITER(S): 9 INJECTION INTRAMUSCULAR; INTRAVENOUS; SUBCUTANEOUS at 08:00

## 2023-12-26 RX ADMIN — SIMETHICONE 160 MILLIGRAM(S): 80 TABLET, CHEWABLE ORAL at 05:12

## 2023-12-26 RX ADMIN — TACROLIMUS 0.5 MILLIGRAM(S): 5 CAPSULE ORAL at 11:51

## 2023-12-26 RX ADMIN — SENNA PLUS 2 TABLET(S): 8.6 TABLET ORAL at 21:45

## 2023-12-26 RX ADMIN — GABAPENTIN 100 MILLIGRAM(S): 400 CAPSULE ORAL at 21:45

## 2023-12-26 RX ADMIN — Medication 10 MILLIGRAM(S): at 05:12

## 2023-12-26 RX ADMIN — METHOCARBAMOL 500 MILLIGRAM(S): 500 TABLET, FILM COATED ORAL at 15:22

## 2023-12-26 RX ADMIN — Medication 500000 UNIT(S): at 21:44

## 2023-12-26 RX ADMIN — Medication 50 MILLIEQUIVALENT(S): at 13:00

## 2023-12-26 RX ADMIN — TRAMADOL HYDROCHLORIDE 25 MILLIGRAM(S): 50 TABLET ORAL at 15:22

## 2023-12-26 RX ADMIN — LIDOCAINE 3 PATCH: 4 CREAM TOPICAL at 22:30

## 2023-12-26 RX ADMIN — HYDROMORPHONE HYDROCHLORIDE 0.5 MILLIGRAM(S): 2 INJECTION INTRAMUSCULAR; INTRAVENOUS; SUBCUTANEOUS at 11:00

## 2023-12-26 NOTE — PROGRESS NOTE ADULT - ASSESSMENT
59 yo M with HFrEF (LVIDd 6.4 cm, LVEF 32%), CAD s/p PCI (2008), HTN, DMT2 (A1c 8.3%) and CVA s/p TPA (2018), recently treated for PNA who initially presented to [facility] via EMS after syncope reportedly requiring defibrilation. Treated for ACS but left AMA to come to [facility]. On arrival ECG with ST depression in lateral leads. Intubated 11/1 for respiratory failure. Found to have COVID. L/RHC 11/1revealing  of LAD and RCA, elevated filling pressures and CI of 1.5 prompting placement of IABP. Extubated 11/3. IABP was weaned to off 11/7, then the following day developed PMVT cardiac arrest with prompt CPR and defibrillation, started on IV Lidocaine and Amio. IABP ultimately replaced on 11/9 for worsening hypotension. TTE 11/11 revealing LV thrombus.     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 AT evaluation launched 11/10, he is ABO A. He was discussed during TSC on 11/16 and was approved for listing, however was found to be Bacteremic with 11/17 Blc Cx growing mixed cultures Staph epi and Enterococcus faecalis and started on IV Vanco. Repeat cultures from 11/18 reveal NGTD and therefore was cleared by ID for listing.     GM + rods (Cutibacterium) in blood culture on 11/30 (reported on 12/4), restarted on vancomycin, and status 7, repeat cultures negative x48 hours (12/6).    Was listed Status 2, ABO A, PRA 0% (12/12). ABP changed to LFA 12/21. Tele with intermittent LBBB, asymptomatic.    A suitable donor was identified. Underwent OHT 12/25 with IABP in place. Ischemic Time: 253min. Blood Products: 2 cryo, 2 plts.  57 yo M with HFrEF (LVIDd 6.4 cm, LVEF 32%), CAD s/p PCI (2008), HTN, DMT2 (A1c 8.3%) and CVA s/p TPA (2018), recently treated for PNA who initially presented to [facility] via EMS after syncope reportedly requiring defibrilation. Treated for ACS but left AMA to come to [facility]. On arrival ECG with ST depression in lateral leads. Intubated 11/1 for respiratory failure. Found to have COVID. L/RHC 11/1revealing  of LAD and RCA, elevated filling pressures and CI of 1.5 prompting placement of IABP. Extubated 11/3. IABP was weaned to off 11/7, then the following day developed PMVT cardiac arrest with prompt CPR and defibrillation, started on IV Lidocaine and Amio. IABP ultimately replaced on 11/9 for worsening hypotension. TTE 11/11 revealing LV thrombus.     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 AT evaluation launched 11/10, he is ABO A. He was discussed during TSC on 11/16 and was approved for listing, however was found to be Bacteremic with 11/17 Blc Cx growing mixed cultures Staph epi and Enterococcus faecalis and started on IV Vanco. Repeat cultures from 11/18 reveal NGTD and therefore was cleared by ID for listing.     GM + rods (Cutibacterium) in blood culture on 11/30 (reported on 12/4), restarted on vancomycin, and status 7, repeat cultures negative x48 hours (12/6).    Was listed Status 2, ABO A, PRA 0% (12/12). ABP changed to LFA 12/21. Tele with intermittent LBBB, asymptomatic.    A suitable donor was identified. Underwent OHT 12/25 with IABP in place. Ischemic Time: 253min. Blood Products: 2 cryo, 2 plts.  57 yo M with HFrEF (LVIDd 6.4 cm, LVEF 32%), CAD s/p PCI (2008), HTN, DMT2 (A1c 8.3%) and CVA s/p TPA (2018), recently treated for PNA who initially presented to [facility] via EMS after syncope reportedly requiring defibrillation. Treated for ACS but left AMA to come to [facility]. On arrival ECG with ST depression in lateral leads. Intubated 11/1 for respiratory failure and was found to have COVID. L/RHC 11/1revealing  of LAD and RCA, elevated filling pressures and CI of 1.5 prompting placement of IABP. Extubated 11/3. IABP was weaned to off 11/7, however the following day developed PMVT cardiac arrest with prompt CPR and defibrillation, started on IV Lidocaine/Amio and IABP ultimately replaced on 11/9. During this admission was found to be bacteremic for which she was treated for Staph epi, Enterococcus faecalis, Cutibacterium with IV abx.  Was listed Status 2, ABO A, PRA 0% (12/12).     A suitable donor was identified and patient underwent OHT 12/25 (ischemic Time: 253min, CMV -/+, Toxo -/-) and extubated the following day. He overall is progressing well. At this time remains on Epi and Primacor, which will be weaned as tolerated. Will undergo RHC/EMBx on Tuesday 1/2.  57 yo M with HFrEF (LVIDd 6.4 cm, LVEF 32%), CAD s/p PCI (2008), HTN, DMT2 (A1c 8.3%) and CVA s/p TPA (2018), recently treated for PNA who initially presented to [facility] via EMS after syncope reportedly requiring defibrillation. Treated for ACS but left AMA to come to [facility]. On arrival ECG with ST depression in lateral leads. Intubated 11/1 for respiratory failure and was found to have COVID. L/RHC 11/1revealing  of LAD and RCA, elevated filling pressures and CI of 1.5 prompting placement of IABP. Extubated 11/3. IABP was weaned to off 11/7, however the following day developed PMVT cardiac arrest with prompt CPR and defibrillation, started on IV Lidocaine/Amio and IABP ultimately replaced on 11/9. During this admission was found to be bacteremic for which she was treated for Staph epi, Enterococcus faecalis, Cutibacterium with IV abx.  Was listed Status 2, ABO A, PRA 0% (12/12).     A suitable donor was identified and patient underwent OHT 12/25 (ischemic Time: 253min, CMV -/+, Toxo -/-) and extubated the following day. He overall is progressing well. At this time remains on Epi and Primacor, which will be weaned as tolerated. Will undergo RHC/EMBx on Tuesday 1/2.

## 2023-12-26 NOTE — PROGRESS NOTE ADULT - PROBLEM SELECTOR PLAN 1
- On 12/25. Ischemic Time: 253min.  - Drips: Propofol 50, Milrinone 0.25, Epi 0.02  - Changed from RFA IABP to LFA IABP on 12/21. On AC with Heparin infusion   - Please keep primary Dr. Banuelos [phone] in the loop per family request. - On 12/25. Ischemic Time: 253min.  - Drips: Propofol 50, Milrinone 0.25, Epi 0.02  - Changed from RFA IABP to LFA IABP on 12/21. On AC with Heparin infusion   - Please keep primary Dr. Banuelos [phone] in the loop per family request. - s/p OHT on 12/25. Ischemic Time: 253min  - IABP remains in place, will plan for removal later today   - remains on Epi @ 0.02 and Primacor @ 0.25  - continue to wean off Cardene gtt as tolerated  - plan to wean off Sabrina   - diuretics as needed to target CVP 8-10  - will undergo RHC/EMBx on Tuesday 1/2  - Please keep primary Dr. Banuelos [phone] in the loop per family request. - s/p OHT on 12/25. Ischemic Time: 253min  - IABP remains in place, will plan for removal later today   - remains on Epi @ 0.02 and Primacor @ 0.25  - continue to wean off Cardene gtt as tolerated  - plan to wean off Sabrina   - diuretics as needed to target CVP 8-10  - will undergo RHC/EMBx on Tuesday 1/2  - Please keep primary Dr. Banuelos [phone] in the loop per family request.

## 2023-12-26 NOTE — CONSULT NOTE ADULT - PROBLEM SELECTOR PROBLEM 4
ARIC (acute kidney injury)
Encounter for palliative care
HLD (hyperlipidemia)
R/O Respiratory arrest

## 2023-12-26 NOTE — PROGRESS NOTE ADULT - ASSESSMENT
57 yo male h/o htn, cad s/p pci, ICH, here with NSTEMI  s/p intubation and cath. now in CCU    NSTEMI  s/p  cath  cath results noted. multi vessel dz., CTSx f/u.    pt with vtach/fib arrest again on 11/8. s/p ACLS and ROSC.   in ccu with iabp   plan for transplant as per HF and transplant team  transplant w/u ongoing.   s/p colonoscopy 11/17. normal  now listed for transplant as of 11/22 12/25: pt s/p heart transplant last night. remains intubated with iabp in CTU.   12/26: pt extubated to high flow this am. IABP currently being removed. pt a0x3.   mngt as per CCU team          Advanced care planning was discussed with patient and family.  Advanced care planning forms were reviewed and discussed as appropriate.  Differential diagnosis and plan of care discussed with patient after the evaluation.   Pain assessed and judicious use of narcotics when appropriate was discussed.  Importance of Fall prevention discussed.  Counseling on Smoking and Alcohol cessation was offered when appropriate.  Counseling on Diet, exercise, and medication compliance was done.       Approx 75 minutes spent. 59 yo male h/o htn, cad s/p pci, ICH, here with NSTEMI  s/p intubation and cath. now in CCU    NSTEMI  s/p  cath  cath results noted. multi vessel dz., CTSx f/u.    pt with vtach/fib arrest again on 11/8. s/p ACLS and ROSC.   in ccu with iabp   plan for transplant as per HF and transplant team  transplant w/u ongoing.   s/p colonoscopy 11/17. normal  now listed for transplant as of 11/22 12/25: pt s/p heart transplant last night. remains intubated with iabp in CTU.   12/26: pt extubated to high flow this am. IABP currently being removed. pt a0x3.   mngt as per CCU team          Advanced care planning was discussed with patient and family.  Advanced care planning forms were reviewed and discussed as appropriate.  Differential diagnosis and plan of care discussed with patient after the evaluation.   Pain assessed and judicious use of narcotics when appropriate was discussed.  Importance of Fall prevention discussed.  Counseling on Smoking and Alcohol cessation was offered when appropriate.  Counseling on Diet, exercise, and medication compliance was done.       Approx 75 minutes spent.

## 2023-12-26 NOTE — PROGRESS NOTE ADULT - SUBJECTIVE AND OBJECTIVE BOX
Patient seen and examined at the bedside.    Remained critically ill on continuous ICU monitoring.    OBJECTIVE:  Vital Signs Last 24 Hrs  T(C): 35.4 (26 Dec 2023 17:00), Max: 37 (25 Dec 2023 20:00)  T(F): 95.7 (26 Dec 2023 17:00), Max: 98.6 (25 Dec 2023 20:00)  HR: 88 (26 Dec 2023 17:00) (80 - 90)  BP: --  BP(mean): --  RR: 11 (26 Dec 2023 17:00) (10 - 46)  SpO2: 96% (26 Dec 2023 17:00) (95% - 100%)    Parameters below as of 26 Dec 2023 16:00  Patient On (Oxygen Delivery Method): nasal cannula, high flow  O2 Flow (L/min): 40  O2 Concentration (%): 40      Physical Exam:   General: NAD   Neurology: nonfocal   Eyes: bilateral pupils equal and reactive   ENT/Neck: Neck supple, trachea midline, No JVD   Respiratory: Clear bilaterally   CV: S1S2, no murmurs        [x] Sternal dressing, [x] Mediastinal CT, [x] Pleural CT, [x] VIRGILIO drain        [x] Sinus rhythm, [x] Afib, [x] Temporary pacing, [x] PPM  Abdominal: Soft, NT, ND +BS   Extremities: 1-2+ pedal edema noted, + peripheral pulses   Skin: No Rashes, Hematoma, Ecchymosis                           ============================I/O===========================   I&O's Detail    25 Dec 2023 07:01  -  26 Dec 2023 07:00  --------------------------------------------------------  IN:    Albumin 25%  -  50 mL: 200 mL    Albumin 5%  - 250 mL: 250 mL    Dexmedetomidine: 489.5 mL    Enteral Tube Flush: 160 mL    EPINEPHrine: 22.2 mL    EPINEPHrine: 162.8 mL    Insulin: 129.5 mL    IV PiggyBack: 966.8 mL    IV PiggyBack: 250 mL    Milrinone: 177.6 mL    NiCARdipine: 210 mL    Propofol: 216.7 mL    sodium chloride 0.9%: 240 mL  Total IN: 3475.1 mL    OUT:    Chest Tube (mL): 95 mL    Chest Tube (mL): 70 mL    Chest Tube (mL): 130 mL    Chest Tube (mL): 35 mL    Indwelling Catheter - Urethral (mL): 2785 mL  Total OUT: 3115 mL    Total NET: 360.1 mL      26 Dec 2023 07:01  -  26 Dec 2023 17:41  --------------------------------------------------------  IN:    Dexmedetomidine: 165.5 mL    EPINEPHrine: 37 mL    Insulin: 56.5 mL    IV PiggyBack: 50 mL    IV PiggyBack: 216.8 mL    Milrinone: 81.4 mL    NiCARdipine: 135 mL    Oral Fluid: 120 mL    sodium chloride 0.9%: 110 mL  Total IN: 972.2 mL    OUT:    Chest Tube (mL): 0 mL    Chest Tube (mL): 10 mL    Chest Tube (mL): 20 mL    Chest Tube (mL): 100 mL    Indwelling Catheter - Urethral (mL): 1345 mL    Nasogastric/Oral tube (mL): 250 mL  Total OUT: 1725 mL    Total NET: -752.8 mL  ============================ LABS =========================                        8.8    11.11 )-----------( 198      ( 26 Dec 2023 00:32 )             26.6     12-26    140  |  105  |  26<H>  ----------------------------<  117<H>  3.7   |  20<L>  |  1.41<H>    Ca    10.3      26 Dec 2023 00:31  Phos  3.1     12-26  Mg     2.3     12-26    TPro  6.6  /  Alb  4.3  /  TBili  0.4  /  DBili  x   /  AST  72<H>  /  ALT  33  /  AlkPhos  44  12-26    LIVER FUNCTIONS - ( 26 Dec 2023 00:31 )  Alb: 4.3 g/dL / Pro: 6.6 g/dL / ALK PHOS: 44 U/L / ALT: 33 U/L / AST: 72 U/L / GGT: x           PT/INR - ( 26 Dec 2023 00:33 )   PT: 13.3 sec;   INR: 1.22 ratio         PTT - ( 26 Dec 2023 00:33 )  PTT:27.1 sec  ABG - ( 26 Dec 2023 16:50 )  pH, Arterial: 7.35  pH, Blood: x     /  pCO2: 41    /  pO2: 125   / HCO3: 23    / Base Excess: -2.8  /  SaO2: 100.0     Urinalysis Basic - ( 26 Dec 2023 00:31 )    Color: x / Appearance: x / SG: x / pH: x  Gluc: 117 mg/dL / Ketone: x  / Bili: x / Urobili: x   Blood: x / Protein: x / Nitrite: x   Leuk Esterase: x / RBC: x / WBC x   Sq Epi: x / Non Sq Epi: x / Bacteria: x    ======================Micro/Rad/Cardio=================  Culture: Reviewed   CXR: Reviewed  Echo: Reviewed  ======================================================  PAST MEDICAL & SURGICAL HISTORY:  HTN (hypertension)      CAD (coronary artery disease)  2009; stent      Intracranial hemorrhage  2008      Respiratory arrest  december 1st      Myocardial infarction, unspecified MI type, unspecified artery      History of coronary artery stent placement          ======================================================    Assessment:  59yo M w/ PMHx HTN, CAD s/p stent (2009), ICH (2008), ICM now s/p OHT with IABP on 12/25/2023.    S/p OHT on 12/25/2023  At risk for hemodynamic instability and cardiac arrhythmias  Post op respiratory insufficiency  Receiving inotropic medication  Acute blood loss anemia  Stress hyperglycemia    ======================================================  Plan:   ***Neuro***  [x] Sedated with [x] Precedex   Post operative neuro assessment   Pain management with Gabapentin, Robaxin, Tramadol and prns     ***Cardiovascular***  Invasive hemodynamic monitoring, assess perfusion indices   SR / CVP ___ / MAP ___ / PAP ___ / CI ___ / Hct ___ / Lactate ___   [x] Levophed [x] Vasopressin [x] Primacor 0.25 mcgs/kG/Min [x] Epinephrine [x] Dobutamine [x] Cardene mg/hr for afterload reduction.  [x]  IABP [x]  LVAD [x]  Impella [x] ECMO *make sure to document settings*   Volume:    Reassessment of hemodynamics post resuscitation *or .rh if no fluids above*  *insert other cardiac meds/indications here*   Monitor chest tube outputs *remove if none present, check DAILY*  [x] VTE ppx with Heparin SQ   [x]  ASA [x]  Plavix [x] Statin   Serial EKG and cardiac enzymes     ***Pulmonary***  [x] NO 4 ppm   Post op vent management   Titration of FiO2 and PEEP, follow SpO2, CXR, blood gasses     Mode: CPAP with PS  FiO2: 40  PEEP: 5  PS: 5  MAP: 8  PC:   PIP: 15              ***GI***  [x] NPO   [x] Protonix for stress ulcer prophylaxis  Bowel regimen with Miralax and Senna   Reglan for gut motility   Simethicone for gas   Continue Movantik     ***Renal***  [x] ARIC [x]  CKD [x] ESRD on HD   Continue to monitor I/Os, BUN/Creatinine.   Replete lytes PRN  Llamas present *remove if none*  *insert renal meds here*    ***ID***  Daptomycin and cefepime for perioperative transplant coverage  PO Vanco for c.diff prophylaxis  Immunosuppression with tacro, cellcept, steroids  Nystatin for oral thrush ppx    ***Endocrine***  [x] Stress Hyperglycemia [x]  DM2 [x] DM1 [x] Prediabetes : HbA1c ____%                - [x] Insulin gtt               - Need tight glycemic control to prevent wound infection.                Patient requires continuous monitoring with:  bedside rhythm monitoring, arterial line, pulse oximetry, ventilator management and monitoring; intermittent blood gas analysis.  Care plan discussed with ICU care team.  patient remain critical; required more than usual post op care; I have spent 45 minutes providing non routine post op care, revaluated multiple times during the day .     Care Plan discussed with the ICU care team. Patient remained critical, at risk for life threatening decompensation.     By signing my name below, I, Rosalba Tapia, attest that this documentation has been prepared under the direction and in the presence of Ayo Hudson MD.  Electronically signed: Rosalba Phillips, 12-26-23 @ 17:41    I, Becca Rollins, personally performed the services described in this documentation. all medical record entries made by the scribe were at my direction and in my presence. I have reviewed the chart and agree that the record reflects my personal performance and is accurate and complete  Electronically signed: Ayo Hudson MD. Patient seen and examined at the bedside.    Remained critically ill on continuous ICU monitoring.    OBJECTIVE:  Vital Signs Last 24 Hrs  T(C): 35.4 (26 Dec 2023 17:00), Max: 37 (25 Dec 2023 20:00)  T(F): 95.7 (26 Dec 2023 17:00), Max: 98.6 (25 Dec 2023 20:00)  HR: 88 (26 Dec 2023 17:00) (80 - 90)  BP: --  BP(mean): --  RR: 11 (26 Dec 2023 17:00) (10 - 46)  SpO2: 96% (26 Dec 2023 17:00) (95% - 100%)    Parameters below as of 26 Dec 2023 16:00  Patient On (Oxygen Delivery Method): nasal cannula, high flow  O2 Flow (L/min): 40  O2 Concentration (%): 40      Physical Exam:   General: NAD   Neurology: nonfocal   Eyes: bilateral pupils equal and reactive   ENT/Neck: Neck supple, trachea midline, No JVD   Respiratory: Clear bilaterally   CV: S1S2, no murmurs        [x] Sternal dressing, [x] Mediastinal CT, [x] Pleural CT, [x] VIRGILIO drain        [x] Sinus rhythm, [x] Afib, [x] Temporary pacing, [x] PPM  Abdominal: Soft, NT, ND +BS   Extremities: 1-2+ pedal edema noted, + peripheral pulses   Skin: No Rashes, Hematoma, Ecchymosis                           ============================I/O===========================   I&O's Detail    25 Dec 2023 07:01  -  26 Dec 2023 07:00  --------------------------------------------------------  IN:    Albumin 25%  -  50 mL: 200 mL    Albumin 5%  - 250 mL: 250 mL    Dexmedetomidine: 489.5 mL    Enteral Tube Flush: 160 mL    EPINEPHrine: 22.2 mL    EPINEPHrine: 162.8 mL    Insulin: 129.5 mL    IV PiggyBack: 966.8 mL    IV PiggyBack: 250 mL    Milrinone: 177.6 mL    NiCARdipine: 210 mL    Propofol: 216.7 mL    sodium chloride 0.9%: 240 mL  Total IN: 3475.1 mL    OUT:    Chest Tube (mL): 95 mL    Chest Tube (mL): 70 mL    Chest Tube (mL): 130 mL    Chest Tube (mL): 35 mL    Indwelling Catheter - Urethral (mL): 2785 mL  Total OUT: 3115 mL    Total NET: 360.1 mL      26 Dec 2023 07:01  -  26 Dec 2023 17:41  --------------------------------------------------------  IN:    Dexmedetomidine: 165.5 mL    EPINEPHrine: 37 mL    Insulin: 56.5 mL    IV PiggyBack: 50 mL    IV PiggyBack: 216.8 mL    Milrinone: 81.4 mL    NiCARdipine: 135 mL    Oral Fluid: 120 mL    sodium chloride 0.9%: 110 mL  Total IN: 972.2 mL    OUT:    Chest Tube (mL): 0 mL    Chest Tube (mL): 10 mL    Chest Tube (mL): 20 mL    Chest Tube (mL): 100 mL    Indwelling Catheter - Urethral (mL): 1345 mL    Nasogastric/Oral tube (mL): 250 mL  Total OUT: 1725 mL    Total NET: -752.8 mL  ============================ LABS =========================                        8.8    11.11 )-----------( 198      ( 26 Dec 2023 00:32 )             26.6     12-26    140  |  105  |  26<H>  ----------------------------<  117<H>  3.7   |  20<L>  |  1.41<H>    Ca    10.3      26 Dec 2023 00:31  Phos  3.1     12-26  Mg     2.3     12-26    TPro  6.6  /  Alb  4.3  /  TBili  0.4  /  DBili  x   /  AST  72<H>  /  ALT  33  /  AlkPhos  44  12-26    LIVER FUNCTIONS - ( 26 Dec 2023 00:31 )  Alb: 4.3 g/dL / Pro: 6.6 g/dL / ALK PHOS: 44 U/L / ALT: 33 U/L / AST: 72 U/L / GGT: x           PT/INR - ( 26 Dec 2023 00:33 )   PT: 13.3 sec;   INR: 1.22 ratio         PTT - ( 26 Dec 2023 00:33 )  PTT:27.1 sec  ABG - ( 26 Dec 2023 16:50 )  pH, Arterial: 7.35  pH, Blood: x     /  pCO2: 41    /  pO2: 125   / HCO3: 23    / Base Excess: -2.8  /  SaO2: 100.0     Urinalysis Basic - ( 26 Dec 2023 00:31 )    Color: x / Appearance: x / SG: x / pH: x  Gluc: 117 mg/dL / Ketone: x  / Bili: x / Urobili: x   Blood: x / Protein: x / Nitrite: x   Leuk Esterase: x / RBC: x / WBC x   Sq Epi: x / Non Sq Epi: x / Bacteria: x    ======================Micro/Rad/Cardio=================  Culture: Reviewed   CXR: Reviewed  Echo: Reviewed  ======================================================  PAST MEDICAL & SURGICAL HISTORY:  HTN (hypertension)      CAD (coronary artery disease)  2009; stent      Intracranial hemorrhage  2008      Respiratory arrest  december 1st      Myocardial infarction, unspecified MI type, unspecified artery      History of coronary artery stent placement          ======================================================    Assessment:  57yo M w/ PMHx HTN, CAD s/p stent (2009), ICH (2008), ICM now s/p OHT with IABP on 12/25/2023.    S/p OHT on 12/25/2023  At risk for hemodynamic instability and cardiac arrhythmias  Post op respiratory insufficiency  Receiving inotropic medication  Acute blood loss anemia  Stress hyperglycemia    ======================================================  Plan:   ***Neuro***  [x] Sedated with [x] Precedex   Post operative neuro assessment   Pain management with Gabapentin, Robaxin, Tramadol and prns     ***Cardiovascular***  Invasive hemodynamic monitoring, assess perfusion indices   SR / CVP ___ / MAP ___ / PAP ___ / CI ___ / Hct ___ / Lactate ___   [x] Levophed [x] Vasopressin [x] Primacor 0.25 mcgs/kG/Min [x] Epinephrine [x] Dobutamine [x] Cardene mg/hr for afterload reduction.  [x]  IABP [x]  LVAD [x]  Impella [x] ECMO *make sure to document settings*   Volume:    Reassessment of hemodynamics post resuscitation *or .rh if no fluids above*  *insert other cardiac meds/indications here*   Monitor chest tube outputs *remove if none present, check DAILY*  [x] VTE ppx with Heparin SQ   [x]  ASA [x]  Plavix [x] Statin   Serial EKG and cardiac enzymes     ***Pulmonary***  [x] NO 4 ppm   Post op vent management   Titration of FiO2 and PEEP, follow SpO2, CXR, blood gasses     Mode: CPAP with PS  FiO2: 40  PEEP: 5  PS: 5  MAP: 8  PC:   PIP: 15              ***GI***  [x] NPO   [x] Protonix for stress ulcer prophylaxis  Bowel regimen with Miralax and Senna   Reglan for gut motility   Simethicone for gas   Continue Movantik     ***Renal***  [x] ARIC [x]  CKD [x] ESRD on HD   Continue to monitor I/Os, BUN/Creatinine.   Replete lytes PRN  Llamas present *remove if none*  *insert renal meds here*    ***ID***  Daptomycin and cefepime for perioperative transplant coverage  PO Vanco for c.diff prophylaxis  Immunosuppression with tacro, cellcept, steroids  Nystatin for oral thrush ppx    ***Endocrine***  [x] Stress Hyperglycemia [x]  DM2 [x] DM1 [x] Prediabetes : HbA1c ____%                - [x] Insulin gtt               - Need tight glycemic control to prevent wound infection.                Patient requires continuous monitoring with:  bedside rhythm monitoring, arterial line, pulse oximetry, ventilator management and monitoring; intermittent blood gas analysis.  Care plan discussed with ICU care team.  patient remain critical; required more than usual post op care; I have spent 45 minutes providing non routine post op care, revaluated multiple times during the day .     Care Plan discussed with the ICU care team. Patient remained critical, at risk for life threatening decompensation.     By signing my name below, I, Rosalba Tapia, attest that this documentation has been prepared under the direction and in the presence of Ayo Hudson MD.  Electronically signed: Rosalba Phillips, 12-26-23 @ 17:41    I, Becca Rollins, personally performed the services described in this documentation. all medical record entries made by the scribe were at my direction and in my presence. I have reviewed the chart and agree that the record reflects my personal performance and is accurate and complete  Electronically signed: Ayo Hudson MD. Patient seen and examined at the bedside.    Remained critically ill on continuous ICU monitoring.    OBJECTIVE:  Vital Signs Last 24 Hrs  T(C): 35.4 (26 Dec 2023 17:00), Max: 37 (25 Dec 2023 20:00)  T(F): 95.7 (26 Dec 2023 17:00), Max: 98.6 (25 Dec 2023 20:00)  HR: 88 (26 Dec 2023 17:00) (80 - 90)  BP: --  BP(mean): --  RR: 11 (26 Dec 2023 17:00) (10 - 46)  SpO2: 96% (26 Dec 2023 17:00) (95% - 100%)    Parameters below as of 26 Dec 2023 16:00  Patient On (Oxygen Delivery Method): nasal cannula, high flow  O2 Flow (L/min): 40  O2 Concentration (%): 40      Physical Exam:   General: NAD   Neurology: nonfocal   Eyes: bilateral pupils equal and reactive   ENT/Neck: Neck supple, trachea midline, No JVD   Respiratory: Clear bilaterally   CV: S1S2, no murmurs        [x] Sternal dressing, [x] Mediastinal CTx2, [x] Bilateral Pleural CT        [x] Sinus rhythm, [x] PPM  Abdominal: Soft, NT, ND +BS   Extremities: 1-2+ pedal edema noted, + peripheral pulses   Skin: No Rashes, Hematoma, Ecchymosis                         ============================I/O===========================   I&O's Detail    25 Dec 2023 07:01  -  26 Dec 2023 07:00  --------------------------------------------------------  IN:    Albumin 25%  -  50 mL: 200 mL    Albumin 5%  - 250 mL: 250 mL    Dexmedetomidine: 489.5 mL    Enteral Tube Flush: 160 mL    EPINEPHrine: 22.2 mL    EPINEPHrine: 162.8 mL    Insulin: 129.5 mL    IV PiggyBack: 966.8 mL    IV PiggyBack: 250 mL    Milrinone: 177.6 mL    NiCARdipine: 210 mL    Propofol: 216.7 mL    sodium chloride 0.9%: 240 mL  Total IN: 3475.1 mL    OUT:    Chest Tube (mL): 95 mL    Chest Tube (mL): 70 mL    Chest Tube (mL): 130 mL    Chest Tube (mL): 35 mL    Indwelling Catheter - Urethral (mL): 2785 mL  Total OUT: 3115 mL    Total NET: 360.1 mL      26 Dec 2023 07:01  -  26 Dec 2023 17:41  --------------------------------------------------------  IN:    Dexmedetomidine: 165.5 mL    EPINEPHrine: 37 mL    Insulin: 56.5 mL    IV PiggyBack: 50 mL    IV PiggyBack: 216.8 mL    Milrinone: 81.4 mL    NiCARdipine: 135 mL    Oral Fluid: 120 mL    sodium chloride 0.9%: 110 mL  Total IN: 972.2 mL    OUT:    Chest Tube (mL): 0 mL    Chest Tube (mL): 10 mL    Chest Tube (mL): 20 mL    Chest Tube (mL): 100 mL    Indwelling Catheter - Urethral (mL): 1345 mL    Nasogastric/Oral tube (mL): 250 mL  Total OUT: 1725 mL    Total NET: -752.8 mL  ============================ LABS =========================                        8.8    11.11 )-----------( 198      ( 26 Dec 2023 00:32 )             26.6     12-26    140  |  105  |  26<H>  ----------------------------<  117<H>  3.7   |  20<L>  |  1.41<H>    Ca    10.3      26 Dec 2023 00:31  Phos  3.1     12-26  Mg     2.3     12-26    TPro  6.6  /  Alb  4.3  /  TBili  0.4  /  DBili  x   /  AST  72<H>  /  ALT  33  /  AlkPhos  44  12-26    LIVER FUNCTIONS - ( 26 Dec 2023 00:31 )  Alb: 4.3 g/dL / Pro: 6.6 g/dL / ALK PHOS: 44 U/L / ALT: 33 U/L / AST: 72 U/L / GGT: x           PT/INR - ( 26 Dec 2023 00:33 )   PT: 13.3 sec;   INR: 1.22 ratio         PTT - ( 26 Dec 2023 00:33 )  PTT:27.1 sec  ABG - ( 26 Dec 2023 16:50 )  pH, Arterial: 7.35  pH, Blood: x     /  pCO2: 41    /  pO2: 125   / HCO3: 23    / Base Excess: -2.8  /  SaO2: 100.0     Urinalysis Basic - ( 26 Dec 2023 00:31 )    Color: x / Appearance: x / SG: x / pH: x  Gluc: 117 mg/dL / Ketone: x  / Bili: x / Urobili: x   Blood: x / Protein: x / Nitrite: x   Leuk Esterase: x / RBC: x / WBC x   Sq Epi: x / Non Sq Epi: x / Bacteria: x    ======================Micro/Rad/Cardio=================  Culture: Reviewed   CXR: Reviewed  Echo: Reviewed  ======================================================  PAST MEDICAL & SURGICAL HISTORY:  HTN (hypertension)  CAD (coronary artery disease) - 2009; stent  Intracranial hemorrhage - 2008  Respiratory arrest - december 1st  Myocardial infarction, unspecified MI type, unspecified artery  History of coronary artery stent placement  ======================================================    Assessment:  59yo M w/ PMHx HTN, CAD s/p stent (2009), ICH (2008), ICM now s/p OHT with IABP on 12/25/2023.    S/p OHT on 12/25/23  s/p IABP removal 12/26/23  At risk for hemodynamic instability and cardiac arrhythmias  Post op respiratory insufficiency  Receiving inotropic medication  Acute blood loss anemia  Stress hyperglycemia    ======================================================  Plan:   ***Neuro***  [x] Sedated with [x] Precedex - now off   Post operative neuro assessment   Pain management with Gabapentin, Robaxin, Tramadol and prns     ***Cardiovascular***  Invasive hemodynamic monitoring, assess perfusion indices   SR / CVP ___ / MAP ___ / PAP ___ / CI ___ / Hct ___ / Lactate ___   [x] Levophed [x] Vasopressin [x] Primacor 0.25 mcgs/kG/Min [x] Epinephrine [x] Dobutamine [x] Cardene mg/hr for afterload reduction.  [x]  IABP [x]  LVAD [x]  Impella [x] ECMO *make sure to document settings*   Volume:    Reassessment of hemodynamics post resuscitation *or .rh if no fluids above*  *insert other cardiac meds/indications here*   Monitor chest tube outputs  [x] VTE ppx with Heparin SQ   [x]  ASA [x]  Plavix [x] Statin   Serial EKG and cardiac enzymes     ***Pulmonary***  [x] NO 4 ppm - continue to wean   Extubated to  Titration of FiO2 and PEEP, follow SpO2, CXR, blood gasses     Mode: CPAP with PS  FiO2: 40  PEEP: 5  PS: 5  MAP: 8  PC:   PIP: 15              ***GI***  [x] NPO   [x] Protonix for stress ulcer prophylaxis  Bowel regimen with Miralax and Senna   Reglan for gut motility   Simethicone for gas   Continue Movantik     ***Renal***  [x] ARIC [x]  CKD [x] ESRD on HD   Continue to monitor I/Os, BUN/Creatinine.   Replete lytes PRN  Llamas present   Diuresis with 20 mgs Lasix given     ***ID***  Daptomycin and cefepime for perioperative transplant coverage  PO Vanco for c.diff prophylaxis  Immunosuppression with tacro, cellcept, steroids  Nystatin for oral thrush ppx    ***Endocrine***  [x] DM2: HbA1c ____%                - [x] Insulin gtt               - Need tight glycemic control to prevent wound infection.                Patient requires continuous monitoring with:  bedside rhythm monitoring, arterial line, pulse oximetry, ventilator management and monitoring; intermittent blood gas analysis.  Care plan discussed with ICU care team.  patient remain critical; required more than usual post op care; I have spent 45 minutes providing non routine post op care, revaluated multiple times during the day .     Care Plan discussed with the ICU care team. Patient remained critical, at risk for life threatening decompensation.     By signing my name below, I, Rosalba Tapia, attest that this documentation has been prepared under the direction and in the presence of Ayo Hudson MD.  Electronically signed: Rosalba Phillips, 12-26-23 @ 17:41    I, Becca Rollins, personally performed the services described in this documentation. all medical record entries made by the scribe were at my direction and in my presence. I have reviewed the chart and agree that the record reflects my personal performance and is accurate and complete  Electronically signed: Ayo Hudson MD. Patient seen and examined at the bedside.    Remained critically ill on continuous ICU monitoring.    OBJECTIVE:  Vital Signs Last 24 Hrs  T(C): 35.4 (26 Dec 2023 17:00), Max: 37 (25 Dec 2023 20:00)  T(F): 95.7 (26 Dec 2023 17:00), Max: 98.6 (25 Dec 2023 20:00)  HR: 88 (26 Dec 2023 17:00) (80 - 90)  BP: --  BP(mean): --  RR: 11 (26 Dec 2023 17:00) (10 - 46)  SpO2: 96% (26 Dec 2023 17:00) (95% - 100%)    Parameters below as of 26 Dec 2023 16:00  Patient On (Oxygen Delivery Method): nasal cannula, high flow  O2 Flow (L/min): 40  O2 Concentration (%): 40      Physical Exam:   General: NAD   Neurology: nonfocal   Eyes: bilateral pupils equal and reactive   ENT/Neck: Neck supple, trachea midline, No JVD   Respiratory: Clear bilaterally   CV: S1S2, no murmurs        [x] Sternal dressing, [x] Mediastinal CTx3, [x] Bilateral Pleural CT        [x] Paced rhythm, [x] TPM AAI - 90   Abdominal: Soft, NT, ND +BS   Extremities: 1-2+ pedal edema noted, + peripheral pulses   Skin: No Rashes, Hematoma, Ecchymosis                         ============================I/O===========================   I&O's Detail    25 Dec 2023 07:01  -  26 Dec 2023 07:00  --------------------------------------------------------  IN:    Albumin 25%  -  50 mL: 200 mL    Albumin 5%  - 250 mL: 250 mL    Dexmedetomidine: 489.5 mL    Enteral Tube Flush: 160 mL    EPINEPHrine: 22.2 mL    EPINEPHrine: 162.8 mL    Insulin: 129.5 mL    IV PiggyBack: 966.8 mL    IV PiggyBack: 250 mL    Milrinone: 177.6 mL    NiCARdipine: 210 mL    Propofol: 216.7 mL    sodium chloride 0.9%: 240 mL  Total IN: 3475.1 mL    OUT:    Chest Tube (mL): 95 mL    Chest Tube (mL): 70 mL    Chest Tube (mL): 130 mL    Chest Tube (mL): 35 mL    Indwelling Catheter - Urethral (mL): 2785 mL  Total OUT: 3115 mL    Total NET: 360.1 mL      26 Dec 2023 07:01  -  26 Dec 2023 17:41  --------------------------------------------------------  IN:    Dexmedetomidine: 165.5 mL    EPINEPHrine: 37 mL    Insulin: 56.5 mL    IV PiggyBack: 50 mL    IV PiggyBack: 216.8 mL    Milrinone: 81.4 mL    NiCARdipine: 135 mL    Oral Fluid: 120 mL    sodium chloride 0.9%: 110 mL  Total IN: 972.2 mL    OUT:    Chest Tube (mL): 0 mL    Chest Tube (mL): 10 mL    Chest Tube (mL): 20 mL    Chest Tube (mL): 100 mL    Indwelling Catheter - Urethral (mL): 1345 mL    Nasogastric/Oral tube (mL): 250 mL  Total OUT: 1725 mL    Total NET: -752.8 mL  ============================ LABS =========================                        8.8    11.11 )-----------( 198      ( 26 Dec 2023 00:32 )             26.6     12-26    140  |  105  |  26<H>  ----------------------------<  117<H>  3.7   |  20<L>  |  1.41<H>    Ca    10.3      26 Dec 2023 00:31  Phos  3.1     12-26  Mg     2.3     12-26    TPro  6.6  /  Alb  4.3  /  TBili  0.4  /  DBili  x   /  AST  72<H>  /  ALT  33  /  AlkPhos  44  12-26    LIVER FUNCTIONS - ( 26 Dec 2023 00:31 )  Alb: 4.3 g/dL / Pro: 6.6 g/dL / ALK PHOS: 44 U/L / ALT: 33 U/L / AST: 72 U/L / GGT: x           PT/INR - ( 26 Dec 2023 00:33 )   PT: 13.3 sec;   INR: 1.22 ratio         PTT - ( 26 Dec 2023 00:33 )  PTT:27.1 sec  ABG - ( 26 Dec 2023 16:50 )  pH, Arterial: 7.35  pH, Blood: x     /  pCO2: 41    /  pO2: 125   / HCO3: 23    / Base Excess: -2.8  /  SaO2: 100.0     Urinalysis Basic - ( 26 Dec 2023 00:31 )    Color: x / Appearance: x / SG: x / pH: x  Gluc: 117 mg/dL / Ketone: x  / Bili: x / Urobili: x   Blood: x / Protein: x / Nitrite: x   Leuk Esterase: x / RBC: x / WBC x   Sq Epi: x / Non Sq Epi: x / Bacteria: x    ======================Micro/Rad/Cardio=================  Culture: Reviewed   CXR: Reviewed  Echo: Reviewed  ======================================================  PAST MEDICAL & SURGICAL HISTORY:  HTN (hypertension)  CAD (coronary artery disease) - 2009; stent  Intracranial hemorrhage - 2008  Respiratory arrest - december 1st  Myocardial infarction, unspecified MI type, unspecified artery  History of coronary artery stent placement  ======================================================    Assessment:  57yo M w/ PMHx HTN, CAD s/p stent (2009), ICH (2008), ICM now s/p OHT with IABP on 12/25/2023.    S/p OHT on 12/25/23  s/p IABP removal 12/26/23  At risk for hemodynamic instability and cardiac arrhythmias  Post op respiratory insufficiency  Receiving inotropic medication  Acute blood loss anemia  Stress hyperglycemia    ======================================================  Plan:   ***Neuro***  [x] Sedated with [x] Precedex - now off   Post operative neuro assessment   Pain management with Gabapentin, Robaxin, Tramadol and prns     ***Cardiovascular***  Invasive hemodynamic monitoring, assess perfusion indices   CA / CVP 3/ MAP 70/ PAP 11/ CI 3.2/ Hct 26.6/ Lactate 0.8  [x] Primacor 0.25 mcgs/kG/Min [x] Epinephrine - dced [x] Cardene 3 mg/hr for afterload reduction.   Reassessment of hemodynamics post resuscitation   Monitor chest tube outputs  [x] VTE ppx with Heparin SQ   Serial EKG and cardiac enzymes     ***Pulmonary***  [x] NO 2 ppm - continue to wean   Extubated this AM to [x] HFO2 40L/40%  Titration of FiO2 and PEEP, follow SpO2, CXR, blood gasses     Mode: CPAP with PS  FiO2: 40  PEEP: 5  PS: 5  MAP: 8  PC:   PIP: 15              ***GI***  [x] NPO   [x] Protonix for stress ulcer prophylaxis  Bowel regimen with Miralax and Senna   Reglan for gut motility   Simethicone for gas   Continue Movantik     ***Renal***  Continue to monitor I/Os, BUN/Creatinine.   Replete lytes PRN  Llamas present   Diuresis with 20 mgs Lasix given     ***ID***  Daptomycin and cefepime for perioperative transplant coverage  PO Vanco for c.diff prophylaxis  Immunosuppression with tacro, cellcept, steroids  Nystatin for oral thrush ppx    ***Endocrine***  [x] DM2: HbA1c 8.3%                - [x] Insulin gtt               - Need tight glycemic control to prevent wound infection.                Patient requires continuous monitoring with:  bedside rhythm monitoring, arterial line, pulse oximetry, ventilator management and monitoring; intermittent blood gas analysis.  Care plan discussed with ICU care team.  patient remain critical; required more than usual post op care; I have spent 45 minutes providing non routine post op care, revaluated multiple times during the day .     Care Plan discussed with the ICU care team. Patient remained critical, at risk for life threatening decompensation.     By signing my name below, I, Rosalba Tapia, attest that this documentation has been prepared under the direction and in the presence of Ayo Hudson MD.  Electronically signed: Rosalba Phillips, 12-26-23 @ 17:41    I, Becca Rollins, personally performed the services described in this documentation. all medical record entries made by the scribe were at my direction and in my presence. I have reviewed the chart and agree that the record reflects my personal performance and is accurate and complete  Electronically signed: Ayo Hudson MD. Patient seen and examined at the bedside.    Remained critically ill on continuous ICU monitoring.    OBJECTIVE:  Vital Signs Last 24 Hrs  T(C): 35.4 (26 Dec 2023 17:00), Max: 37 (25 Dec 2023 20:00)  T(F): 95.7 (26 Dec 2023 17:00), Max: 98.6 (25 Dec 2023 20:00)  HR: 88 (26 Dec 2023 17:00) (80 - 90)  BP: --  BP(mean): --  RR: 11 (26 Dec 2023 17:00) (10 - 46)  SpO2: 96% (26 Dec 2023 17:00) (95% - 100%)    Parameters below as of 26 Dec 2023 16:00  Patient On (Oxygen Delivery Method): nasal cannula, high flow  O2 Flow (L/min): 40  O2 Concentration (%): 40      Physical Exam:   General: NAD   Neurology: nonfocal   Eyes: bilateral pupils equal and reactive   ENT/Neck: Neck supple, trachea midline, No JVD   Respiratory: Clear bilaterally   CV: S1S2, no murmurs        [x] Sternal dressing, [x] Mediastinal CTx3, [x] Bilateral Pleural CT        [x] Paced rhythm, [x] TPM AAI - 90   Abdominal: Soft, NT, ND +BS   Extremities: 1-2+ pedal edema noted, + peripheral pulses   Skin: No Rashes, Hematoma, Ecchymosis                         ============================I/O===========================   I&O's Detail    25 Dec 2023 07:01  -  26 Dec 2023 07:00  --------------------------------------------------------  IN:    Albumin 25%  -  50 mL: 200 mL    Albumin 5%  - 250 mL: 250 mL    Dexmedetomidine: 489.5 mL    Enteral Tube Flush: 160 mL    EPINEPHrine: 22.2 mL    EPINEPHrine: 162.8 mL    Insulin: 129.5 mL    IV PiggyBack: 966.8 mL    IV PiggyBack: 250 mL    Milrinone: 177.6 mL    NiCARdipine: 210 mL    Propofol: 216.7 mL    sodium chloride 0.9%: 240 mL  Total IN: 3475.1 mL    OUT:    Chest Tube (mL): 95 mL    Chest Tube (mL): 70 mL    Chest Tube (mL): 130 mL    Chest Tube (mL): 35 mL    Indwelling Catheter - Urethral (mL): 2785 mL  Total OUT: 3115 mL    Total NET: 360.1 mL      26 Dec 2023 07:01  -  26 Dec 2023 17:41  --------------------------------------------------------  IN:    Dexmedetomidine: 165.5 mL    EPINEPHrine: 37 mL    Insulin: 56.5 mL    IV PiggyBack: 50 mL    IV PiggyBack: 216.8 mL    Milrinone: 81.4 mL    NiCARdipine: 135 mL    Oral Fluid: 120 mL    sodium chloride 0.9%: 110 mL  Total IN: 972.2 mL    OUT:    Chest Tube (mL): 0 mL    Chest Tube (mL): 10 mL    Chest Tube (mL): 20 mL    Chest Tube (mL): 100 mL    Indwelling Catheter - Urethral (mL): 1345 mL    Nasogastric/Oral tube (mL): 250 mL  Total OUT: 1725 mL    Total NET: -752.8 mL  ============================ LABS =========================                        8.8    11.11 )-----------( 198      ( 26 Dec 2023 00:32 )             26.6     12-26    140  |  105  |  26<H>  ----------------------------<  117<H>  3.7   |  20<L>  |  1.41<H>    Ca    10.3      26 Dec 2023 00:31  Phos  3.1     12-26  Mg     2.3     12-26    TPro  6.6  /  Alb  4.3  /  TBili  0.4  /  DBili  x   /  AST  72<H>  /  ALT  33  /  AlkPhos  44  12-26    LIVER FUNCTIONS - ( 26 Dec 2023 00:31 )  Alb: 4.3 g/dL / Pro: 6.6 g/dL / ALK PHOS: 44 U/L / ALT: 33 U/L / AST: 72 U/L / GGT: x           PT/INR - ( 26 Dec 2023 00:33 )   PT: 13.3 sec;   INR: 1.22 ratio         PTT - ( 26 Dec 2023 00:33 )  PTT:27.1 sec  ABG - ( 26 Dec 2023 16:50 )  pH, Arterial: 7.35  pH, Blood: x     /  pCO2: 41    /  pO2: 125   / HCO3: 23    / Base Excess: -2.8  /  SaO2: 100.0     Urinalysis Basic - ( 26 Dec 2023 00:31 )    Color: x / Appearance: x / SG: x / pH: x  Gluc: 117 mg/dL / Ketone: x  / Bili: x / Urobili: x   Blood: x / Protein: x / Nitrite: x   Leuk Esterase: x / RBC: x / WBC x   Sq Epi: x / Non Sq Epi: x / Bacteria: x    ======================Micro/Rad/Cardio=================  Culture: Reviewed   CXR: Reviewed  Echo: Reviewed  ======================================================  PAST MEDICAL & SURGICAL HISTORY:  HTN (hypertension)  CAD (coronary artery disease) - 2009; stent  Intracranial hemorrhage - 2008  Respiratory arrest - december 1st  Myocardial infarction, unspecified MI type, unspecified artery  History of coronary artery stent placement  ======================================================    Assessment:  59yo M w/ PMHx HTN, CAD s/p stent (2009), ICH (2008), ICM now s/p OHT with IABP on 12/25/2023.    S/p OHT on 12/25/23  s/p IABP removal 12/26/23  At risk for hemodynamic instability and cardiac arrhythmias  Post op respiratory insufficiency  Receiving inotropic medication  Acute blood loss anemia  Stress hyperglycemia    ======================================================  Plan:   ***Neuro***  [x] Sedated with [x] Precedex - now off   Post operative neuro assessment   Pain management with Gabapentin, Robaxin, Tramadol and prns     ***Cardiovascular***  Invasive hemodynamic monitoring, assess perfusion indices   AR / CVP 3/ MAP 70/ PAP 11/ CI 3.2/ Hct 26.6/ Lactate 0.8  [x] Primacor 0.25 mcgs/kG/Min [x] Epinephrine - dced [x] Cardene 3 mg/hr for afterload reduction.   Reassessment of hemodynamics post resuscitation   Monitor chest tube outputs  [x] VTE ppx with Heparin SQ   Serial EKG and cardiac enzymes     ***Pulmonary***  [x] NO 2 ppm - continue to wean   Extubated this AM to [x] HFO2 40L/40%  Titration of FiO2 and PEEP, follow SpO2, CXR, blood gasses     Mode: CPAP with PS  FiO2: 40  PEEP: 5  PS: 5  MAP: 8  PC:   PIP: 15              ***GI***  [x] NPO   [x] Protonix for stress ulcer prophylaxis  Bowel regimen with Miralax and Senna   Reglan for gut motility   Simethicone for gas   Continue Movantik     ***Renal***  Continue to monitor I/Os, BUN/Creatinine.   Replete lytes PRN  Llamas present   Diuresis with 20 mgs Lasix given     ***ID***  Daptomycin and cefepime for perioperative transplant coverage  PO Vanco for c.diff prophylaxis  Immunosuppression with tacro, cellcept, steroids  Nystatin for oral thrush ppx    ***Endocrine***  [x] DM2: HbA1c 8.3%                - [x] Insulin gtt               - Need tight glycemic control to prevent wound infection.                Patient requires continuous monitoring with:  bedside rhythm monitoring, arterial line, pulse oximetry, ventilator management and monitoring; intermittent blood gas analysis.  Care plan discussed with ICU care team.  patient remain critical; required more than usual post op care; I have spent 45 minutes providing non routine post op care, revaluated multiple times during the day .     Care Plan discussed with the ICU care team. Patient remained critical, at risk for life threatening decompensation.     By signing my name below, I, Rosalba Tapia, attest that this documentation has been prepared under the direction and in the presence of Ayo Hudson MD.  Electronically signed: Rosalba Phillips, 12-26-23 @ 17:41    I, Becca Rollins, personally performed the services described in this documentation. all medical record entries made by the scribe were at my direction and in my presence. I have reviewed the chart and agree that the record reflects my personal performance and is accurate and complete  Electronically signed: Ayo Hudson MD. Patient seen and examined at the bedside.    Remained critically ill on continuous ICU monitoring.    OBJECTIVE:  Vital Signs Last 24 Hrs  T(C): 35.4 (26 Dec 2023 17:00), Max: 37 (25 Dec 2023 20:00)  T(F): 95.7 (26 Dec 2023 17:00), Max: 98.6 (25 Dec 2023 20:00)  HR: 88 (26 Dec 2023 17:00) (80 - 90)  BP: --  BP(mean): --  RR: 11 (26 Dec 2023 17:00) (10 - 46)  SpO2: 96% (26 Dec 2023 17:00) (95% - 100%)    Parameters below as of 26 Dec 2023 16:00  Patient On (Oxygen Delivery Method): nasal cannula, high flow  O2 Flow (L/min): 40  O2 Concentration (%): 40      Physical Exam:   General: NAD   Neurology: nonfocal   Eyes: bilateral pupils equal and reactive   ENT/Neck: Neck supple, trachea midline, No JVD   Respiratory: Clear bilaterally   CV: S1S2, no murmurs        [x] Sternal dressing, [x] Mediastinal CTx3, [x] Bilateral Pleural CT        [x] Paced rhythm, [x] TPM AAI - 90   Abdominal: Soft, NT, ND +BS   Extremities: 1-2+ pedal edema noted, + peripheral pulses                     ============================I/O===========================   I&O's Detail    25 Dec 2023 07:01  -  26 Dec 2023 07:00  --------------------------------------------------------  IN:    Albumin 25%  -  50 mL: 200 mL    Albumin 5%  - 250 mL: 250 mL    Dexmedetomidine: 489.5 mL    Enteral Tube Flush: 160 mL    EPINEPHrine: 22.2 mL    EPINEPHrine: 162.8 mL    Insulin: 129.5 mL    IV PiggyBack: 966.8 mL    IV PiggyBack: 250 mL    Milrinone: 177.6 mL    NiCARdipine: 210 mL    Propofol: 216.7 mL    sodium chloride 0.9%: 240 mL  Total IN: 3475.1 mL    OUT:    Chest Tube (mL): 95 mL    Chest Tube (mL): 70 mL    Chest Tube (mL): 130 mL    Chest Tube (mL): 35 mL    Indwelling Catheter - Urethral (mL): 2785 mL  Total OUT: 3115 mL    Total NET: 360.1 mL      26 Dec 2023 07:01  -  26 Dec 2023 17:41  --------------------------------------------------------  IN:    Dexmedetomidine: 165.5 mL    EPINEPHrine: 37 mL    Insulin: 56.5 mL    IV PiggyBack: 50 mL    IV PiggyBack: 216.8 mL    Milrinone: 81.4 mL    NiCARdipine: 135 mL    Oral Fluid: 120 mL    sodium chloride 0.9%: 110 mL  Total IN: 972.2 mL    OUT:    Chest Tube (mL): 0 mL    Chest Tube (mL): 10 mL    Chest Tube (mL): 20 mL    Chest Tube (mL): 100 mL    Indwelling Catheter - Urethral (mL): 1345 mL    Nasogastric/Oral tube (mL): 250 mL  Total OUT: 1725 mL    Total NET: -752.8 mL  ============================ LABS =========================                        8.8    11.11 )-----------( 198      ( 26 Dec 2023 00:32 )             26.6     12-26    140  |  105  |  26<H>  ----------------------------<  117<H>  3.7   |  20<L>  |  1.41<H>    Ca    10.3      26 Dec 2023 00:31  Phos  3.1     12-26  Mg     2.3     12-26    TPro  6.6  /  Alb  4.3  /  TBili  0.4  /  DBili  x   /  AST  72<H>  /  ALT  33  /  AlkPhos  44  12-26    LIVER FUNCTIONS - ( 26 Dec 2023 00:31 )  Alb: 4.3 g/dL / Pro: 6.6 g/dL / ALK PHOS: 44 U/L / ALT: 33 U/L / AST: 72 U/L / GGT: x           PT/INR - ( 26 Dec 2023 00:33 )   PT: 13.3 sec;   INR: 1.22 ratio         PTT - ( 26 Dec 2023 00:33 )  PTT:27.1 sec  ABG - ( 26 Dec 2023 16:50 )  pH, Arterial: 7.35  pH, Blood: x     /  pCO2: 41    /  pO2: 125   / HCO3: 23    / Base Excess: -2.8  /  SaO2: 100.0     Urinalysis Basic - ( 26 Dec 2023 00:31 )    Color: x / Appearance: x / SG: x / pH: x  Gluc: 117 mg/dL / Ketone: x  / Bili: x / Urobili: x   Blood: x / Protein: x / Nitrite: x   Leuk Esterase: x / RBC: x / WBC x   Sq Epi: x / Non Sq Epi: x / Bacteria: x    ======================Micro/Rad/Cardio=================  Culture: Reviewed   CXR: Reviewed  Echo: Reviewed  ======================================================  PAST MEDICAL & SURGICAL HISTORY:  HTN (hypertension)  CAD (coronary artery disease) - 2009; stent  Intracranial hemorrhage - 2008  Respiratory arrest - december 1st  Myocardial infarction, unspecified MI type, unspecified artery  History of coronary artery stent placement  ======================================================    Assessment:  59yo M w/ PMHx HTN, CAD s/p stent (2009), ICH (2008), ICM now s/p OHT with IABP on 12/25/2023.    S/p OHT on 12/25/23  s/p IABP removal 12/26/23  acute systolic congestive heart failure  encounter temporary cardiac pacing  At risk for hemodynamic instability and cardiac arrhythmias  Post op respiratory insufficiency  Receiving inotropic medication  Acute blood loss anemia  Stress hyperglycemia    ======================================================  Plan:   ***Neuro***  [x] off sedation  non focal  Pain management with Gabapentin, Robaxin, Tramadol and prns     ***Cardiovascular***  Invasive hemodynamic monitoring, assess perfusion indices   ND / CVP 3/ MAP 70/ PAP 11/ CI 3.2/ Hct 26.6/ Lactate 0.8  [x] Primacor 0.25 mcgs/kG/Min [x] Epinephrine - dced [x] Cardene 3 mg/hr for afterload reduction.   Reassessment of hemodynamics post resuscitation   Monitor chest tube outputs  [x] VTE ppx with Heparin SQ   atrial pacing AAI 90    ***Pulmonary***  [x] Sabrina weaning 10--> 0  Extubated this AM to [x] HFO2 40L/40%  Titration of FiO2 and PEEP, follow SpO2, CXR, blood gasses     ***GI***  [x] NPO   [x] Protonix for stress ulcer prophylaxis  Bowel regimen with Miralax and Senna   Reglan for gut motility   Simethicone for gas   Continue Movantik     ***Renal***  Continue to monitor I/Os, BUN/Creatinine.   Replete lytes PRN  Llamas present   Diuresis with 20 mgs Lasix given     ***ID***  Daptomycin and cefepime for perioperative transplant coverage  PO Vanco for c.diff prophylaxis  Immunosuppression with tacro, cellcept, steroids  Nystatin for oral thrush ppx    ***Endocrine***  [x] DM2: HbA1c 8.3%                - [x] Insulin gtt               - Need tight glycemic control to prevent wound infection.                Patient requires continuous monitoring with:  bedside rhythm monitoring, arterial line, pulse oximetry, ventilator management and monitoring; intermittent blood gas analysis.  Care plan discussed with ICU care team.  patient remain critical; required more than usual post op care; I have spent 50 minutes providing non routine post op care, revaluated multiple times during the day .     Care Plan discussed with the ICU care team. Patient remained critical, at risk for life threatening decompensation.     By signing my name below, I, Rosalba Tapia, attest that this documentation has been prepared under the direction and in the presence of Ayo Hudson MD.  Electronically signed: Rosalba Phillips, 12-26-23 @ 17:41    I, Becca Rollins, personally performed the services described in this documentation. all medical record entries made by the scribe were at my direction and in my presence. I have reviewed the chart and agree that the record reflects my personal performance and is accurate and complete  Electronically signed: Ayo Hudson MD. Patient seen and examined at the bedside.    Remained critically ill on continuous ICU monitoring.    OBJECTIVE:  Vital Signs Last 24 Hrs  T(C): 35.4 (26 Dec 2023 17:00), Max: 37 (25 Dec 2023 20:00)  T(F): 95.7 (26 Dec 2023 17:00), Max: 98.6 (25 Dec 2023 20:00)  HR: 88 (26 Dec 2023 17:00) (80 - 90)  BP: --  BP(mean): --  RR: 11 (26 Dec 2023 17:00) (10 - 46)  SpO2: 96% (26 Dec 2023 17:00) (95% - 100%)    Parameters below as of 26 Dec 2023 16:00  Patient On (Oxygen Delivery Method): nasal cannula, high flow  O2 Flow (L/min): 40  O2 Concentration (%): 40      Physical Exam:   General: NAD   Neurology: nonfocal   Eyes: bilateral pupils equal and reactive   ENT/Neck: Neck supple, trachea midline, No JVD   Respiratory: Clear bilaterally   CV: S1S2, no murmurs        [x] Sternal dressing, [x] Mediastinal CTx3, [x] Bilateral Pleural CT        [x] Paced rhythm, [x] TPM AAI - 90   Abdominal: Soft, NT, ND +BS   Extremities: 1-2+ pedal edema noted, + peripheral pulses                     ============================I/O===========================   I&O's Detail    25 Dec 2023 07:01  -  26 Dec 2023 07:00  --------------------------------------------------------  IN:    Albumin 25%  -  50 mL: 200 mL    Albumin 5%  - 250 mL: 250 mL    Dexmedetomidine: 489.5 mL    Enteral Tube Flush: 160 mL    EPINEPHrine: 22.2 mL    EPINEPHrine: 162.8 mL    Insulin: 129.5 mL    IV PiggyBack: 966.8 mL    IV PiggyBack: 250 mL    Milrinone: 177.6 mL    NiCARdipine: 210 mL    Propofol: 216.7 mL    sodium chloride 0.9%: 240 mL  Total IN: 3475.1 mL    OUT:    Chest Tube (mL): 95 mL    Chest Tube (mL): 70 mL    Chest Tube (mL): 130 mL    Chest Tube (mL): 35 mL    Indwelling Catheter - Urethral (mL): 2785 mL  Total OUT: 3115 mL    Total NET: 360.1 mL      26 Dec 2023 07:01  -  26 Dec 2023 17:41  --------------------------------------------------------  IN:    Dexmedetomidine: 165.5 mL    EPINEPHrine: 37 mL    Insulin: 56.5 mL    IV PiggyBack: 50 mL    IV PiggyBack: 216.8 mL    Milrinone: 81.4 mL    NiCARdipine: 135 mL    Oral Fluid: 120 mL    sodium chloride 0.9%: 110 mL  Total IN: 972.2 mL    OUT:    Chest Tube (mL): 0 mL    Chest Tube (mL): 10 mL    Chest Tube (mL): 20 mL    Chest Tube (mL): 100 mL    Indwelling Catheter - Urethral (mL): 1345 mL    Nasogastric/Oral tube (mL): 250 mL  Total OUT: 1725 mL    Total NET: -752.8 mL  ============================ LABS =========================                        8.8    11.11 )-----------( 198      ( 26 Dec 2023 00:32 )             26.6     12-26    140  |  105  |  26<H>  ----------------------------<  117<H>  3.7   |  20<L>  |  1.41<H>    Ca    10.3      26 Dec 2023 00:31  Phos  3.1     12-26  Mg     2.3     12-26    TPro  6.6  /  Alb  4.3  /  TBili  0.4  /  DBili  x   /  AST  72<H>  /  ALT  33  /  AlkPhos  44  12-26    LIVER FUNCTIONS - ( 26 Dec 2023 00:31 )  Alb: 4.3 g/dL / Pro: 6.6 g/dL / ALK PHOS: 44 U/L / ALT: 33 U/L / AST: 72 U/L / GGT: x           PT/INR - ( 26 Dec 2023 00:33 )   PT: 13.3 sec;   INR: 1.22 ratio         PTT - ( 26 Dec 2023 00:33 )  PTT:27.1 sec  ABG - ( 26 Dec 2023 16:50 )  pH, Arterial: 7.35  pH, Blood: x     /  pCO2: 41    /  pO2: 125   / HCO3: 23    / Base Excess: -2.8  /  SaO2: 100.0     Urinalysis Basic - ( 26 Dec 2023 00:31 )    Color: x / Appearance: x / SG: x / pH: x  Gluc: 117 mg/dL / Ketone: x  / Bili: x / Urobili: x   Blood: x / Protein: x / Nitrite: x   Leuk Esterase: x / RBC: x / WBC x   Sq Epi: x / Non Sq Epi: x / Bacteria: x    ======================Micro/Rad/Cardio=================  Culture: Reviewed   CXR: Reviewed  Echo: Reviewed  ======================================================  PAST MEDICAL & SURGICAL HISTORY:  HTN (hypertension)  CAD (coronary artery disease) - 2009; stent  Intracranial hemorrhage - 2008  Respiratory arrest - december 1st  Myocardial infarction, unspecified MI type, unspecified artery  History of coronary artery stent placement  ======================================================    Assessment:  59yo M w/ PMHx HTN, CAD s/p stent (2009), ICH (2008), ICM now s/p OHT with IABP on 12/25/2023.    S/p OHT on 12/25/23  s/p IABP removal 12/26/23  acute systolic congestive heart failure  encounter temporary cardiac pacing  At risk for hemodynamic instability and cardiac arrhythmias  Post op respiratory insufficiency  Receiving inotropic medication  Acute blood loss anemia  Stress hyperglycemia    ======================================================  Plan:   ***Neuro***  [x] off sedation  non focal  Pain management with Gabapentin, Robaxin, Tramadol and prns     ***Cardiovascular***  Invasive hemodynamic monitoring, assess perfusion indices   CO / CVP 3/ MAP 70/ PAP 11/ CI 3.2/ Hct 26.6/ Lactate 0.8  [x] Primacor 0.25 mcgs/kG/Min [x] Epinephrine - dced [x] Cardene 3 mg/hr for afterload reduction.   Reassessment of hemodynamics post resuscitation   Monitor chest tube outputs  [x] VTE ppx with Heparin SQ   atrial pacing AAI 90    ***Pulmonary***  [x] Sabrina weaning 10--> 0  Extubated this AM to [x] HFO2 40L/40%  Titration of FiO2 and PEEP, follow SpO2, CXR, blood gasses     ***GI***  [x] NPO   [x] Protonix for stress ulcer prophylaxis  Bowel regimen with Miralax and Senna   Reglan for gut motility   Simethicone for gas   Continue Movantik     ***Renal***  Continue to monitor I/Os, BUN/Creatinine.   Replete lytes PRN  Llamas present   Diuresis with 20 mgs Lasix given     ***ID***  Daptomycin and cefepime for perioperative transplant coverage  PO Vanco for c.diff prophylaxis  Immunosuppression with tacro, cellcept, steroids  Nystatin for oral thrush ppx    ***Endocrine***  [x] DM2: HbA1c 8.3%                - [x] Insulin gtt               - Need tight glycemic control to prevent wound infection.                Patient requires continuous monitoring with:  bedside rhythm monitoring, arterial line, pulse oximetry, ventilator management and monitoring; intermittent blood gas analysis.  Care plan discussed with ICU care team.  patient remain critical; required more than usual post op care; I have spent 50 minutes providing non routine post op care, revaluated multiple times during the day .     Care Plan discussed with the ICU care team. Patient remained critical, at risk for life threatening decompensation.     By signing my name below, I, Rosalba Tapia, attest that this documentation has been prepared under the direction and in the presence of Ayo Hudson MD.  Electronically signed: Rosalba Phillips, 12-26-23 @ 17:41    I, Becca Rollins, personally performed the services described in this documentation. all medical record entries made by the scribe were at my direction and in my presence. I have reviewed the chart and agree that the record reflects my personal performance and is accurate and complete  Electronically signed: Ayo Hudson MD.

## 2023-12-26 NOTE — PROGRESS NOTE ADULT - SUBJECTIVE AND OBJECTIVE BOX
ADVANCED HEART FAILURE & TRANSPLANT- PROGRESS NOTE  *To reach the NS1 Team from 8am to 5pm, please call [phone].  ___________________________________________________________________________    Subjective:  - extubated this AM    Medications:  cefepime   IVPB 1000 milliGRAM(s) IV Intermittent every 8 hours  DAPTOmycin IVPB 500 milliGRAM(s) IV Intermittent <User Schedule>  dexMEDEtomidine Infusion 0.5 MICROgram(s)/kG/Hr IV Continuous <Continuous>  dextrose 50% Injectable 50 milliLiter(s) IV Push every 15 minutes  EPINEPHrine    Infusion 0.02 MICROgram(s)/kG/Min IV Continuous <Continuous>  gabapentin 100 milliGRAM(s) Oral every 8 hours  insulin regular Infusion 3 Unit(s)/Hr IV Continuous <Continuous>  lidocaine   4% Patch 3 Patch Transdermal daily  methocarbamol 500 milliGRAM(s) Oral every 8 hours  methylPREDNISolone sodium succinate Injectable 40 milliGRAM(s) IV Push every 12 hours  methylPREDNISolone sodium succinate Injectable   IV Push   metoclopramide Injectable 10 milliGRAM(s) IV Push every 8 hours  milrinone Infusion 0.25 MICROgram(s)/kG/Min IV Continuous <Continuous>  mupirocin 2% Ointment 1 Application(s) Topical two times a day  mycophenolate mofetil IVPB 1000 milliGRAM(s) IV Intermittent <User Schedule>  naloxegol 25 milliGRAM(s) Oral daily  niCARdipine Infusion 3 mG/Hr IV Continuous <Continuous>  pantoprazole  Injectable 40 milliGRAM(s) IV Push daily  polyethylene glycol 3350 17 Gram(s) Oral daily  senna 2 Tablet(s) Oral at bedtime  simethicone 160 milliGRAM(s) Chew every 8 hours  sodium chloride 0.9%. 1000 milliLiter(s) IV Continuous <Continuous>  traMADol 25 milliGRAM(s) Oral every 8 hours  vancomycin    Solution 125 milliGRAM(s) Oral every 12 hours      ICU Vital Signs Last 24 Hrs  T(C): 36.6 (26 Dec 2023 08:00), Max: 37.1 (25 Dec 2023 08:45)  T(F): 97.9 (26 Dec 2023 08:00), Max: 98.8 (25 Dec 2023 08:45)  HR: 88 (26 Dec 2023 08:15) (80 - 90)  BP: --  BP(mean): --  ABP: 102/48 (26 Dec 2023 08:15) (35/29 - 132/57)  ABP(mean): 79 (26 Dec 2023 08:15) (32 - 100)  RR: 16 (26 Dec 2023 08:15) (7 - 25)  SpO2: 98% (26 Dec 2023 08:15) (95% - 100%)    O2 Parameters below as of 26 Dec 2023 08:00  Patient On (Oxygen Delivery Method): nasal cannula, high flow  O2 Flow (L/min): 50  O2 Concentration (%): 50      Mode: CPAP with PS  FiO2: 40  PEEP: 5  PS: 5  MAP: 8  PC:   PIP: 15      Weight in k.3 (- @ 00:00)    I&O's Summary    25 Dec 2023 07:01  -  26 Dec 2023 07:00  --------------------------------------------------------  IN: 3475.1 mL / OUT: 3115 mL / NET: 360.1 mL        Physical Exam  General: No distress. Comfortable.  Neck: Neck supple. JVP not elevated. No masses  Chest: Clear to auscultation bilaterally, + CT   CV: Normal S1 and S2.  Abdomen: Soft, non-distended, non-tender  Extremities: warm and perfused  Neurology: Alert and oriented times three.       Labs:                        8.8    11.11 )-----------( 198      ( 26 Dec 2023 00:32 )             26.6     12-    140  |  105  |  26<H>  ----------------------------<  117<H>  3.7   |  20<L>  |  1.41<H>    Ca    10.3      26 Dec 2023 00:31  Phos  3.1     12  Mg     2.3         TPro  6.6  /  Alb  4.3  /  TBili  0.4  /  DBili  x   /  AST  72<H>  /  ALT  33  /  AlkPhos  44      PT/INR - ( 26 Dec 2023 00:33 )   PT: 13.3 sec;   INR: 1.22 ratio         PTT - ( 26 Dec 2023 00:33 )  PTT:27.1 sec        Oxygen Saturation, Mixed: 80.2 ( @ 00:00)  Oxygen Saturation, Mixed: 74.0 ( @ 19:57)  Oxygen Saturation, Mixed: 71.7 ( @ 15:40)  Oxygen Saturation, Mixed: 74.1 ( @ 14:35)  Oxygen Saturation, Mixed: 82.2 ( @ 09:18)           ADVANCED HEART FAILURE & TRANSPLANT- PROGRESS NOTE  *To reach the NS1 Team from 8am to 5pm, please call [phone].  ___________________________________________________________________________    Subjective:  - extubated this AM    Medications:  cefepime   IVPB 1000 milliGRAM(s) IV Intermittent every 8 hours  DAPTOmycin IVPB 500 milliGRAM(s) IV Intermittent <User Schedule>  dexMEDEtomidine Infusion 0.5 MICROgram(s)/kG/Hr IV Continuous <Continuous>  dextrose 50% Injectable 50 milliLiter(s) IV Push every 15 minutes  EPINEPHrine    Infusion 0.02 MICROgram(s)/kG/Min IV Continuous <Continuous>  gabapentin 100 milliGRAM(s) Oral every 8 hours  insulin regular Infusion 3 Unit(s)/Hr IV Continuous <Continuous>  lidocaine   4% Patch 3 Patch Transdermal daily  methocarbamol 500 milliGRAM(s) Oral every 8 hours  methylPREDNISolone sodium succinate Injectable 40 milliGRAM(s) IV Push every 12 hours  methylPREDNISolone sodium succinate Injectable   IV Push   metoclopramide Injectable 10 milliGRAM(s) IV Push every 8 hours  milrinone Infusion 0.25 MICROgram(s)/kG/Min IV Continuous <Continuous>  mupirocin 2% Ointment 1 Application(s) Topical two times a day  mycophenolate mofetil IVPB 1000 milliGRAM(s) IV Intermittent <User Schedule>  naloxegol 25 milliGRAM(s) Oral daily  niCARdipine Infusion 3 mG/Hr IV Continuous <Continuous>  pantoprazole  Injectable 40 milliGRAM(s) IV Push daily  polyethylene glycol 3350 17 Gram(s) Oral daily  senna 2 Tablet(s) Oral at bedtime  simethicone 160 milliGRAM(s) Chew every 8 hours  sodium chloride 0.9%. 1000 milliLiter(s) IV Continuous <Continuous>  traMADol 25 milliGRAM(s) Oral every 8 hours  vancomycin    Solution 125 milliGRAM(s) Oral every 12 hours      ICU Vital Signs Last 24 Hrs  T(C): 36.6 (26 Dec 2023 08:00), Max: 37.1 (25 Dec 2023 08:45)  T(F): 97.9 (26 Dec 2023 08:00), Max: 98.8 (25 Dec 2023 08:45)  HR: 88 (26 Dec 2023 08:15) (80 - 90)  BP: --  BP(mean): --  ABP: 102/48 (26 Dec 2023 08:15) (35/29 - 132/57)  ABP(mean): 79 (26 Dec 2023 08:15) (32 - 100)  RR: 16 (26 Dec 2023 08:15) (7 - 25)  SpO2: 98% (26 Dec 2023 08:15) (95% - 100%)    O2 Parameters below as of 26 Dec 2023 08:00  Patient On (Oxygen Delivery Method): nasal cannula, high flow  O2 Flow (L/min): 50  O2 Concentration (%): 50      Mode: CPAP with PS  FiO2: 40  PEEP: 5  PS: 5  MAP: 8  PC:   PIP: 15      Weight in k.3 (- @ 00:00)    I&O's Summary    25 Dec 2023 07:01  -  26 Dec 2023 07:00  --------------------------------------------------------  IN: 3475.1 mL / OUT: 3115 mL / NET: 360.1 mL        Physical Exam  General: No distress. Comfortable.  Neck: Neck supple. JVP not elevated. No masses  Chest: Clear to auscultation bilaterally, + CT   CV: Normal S1 and S2.  Abdomen: Soft, non-distended, non-tender  Extremities: warm and perfused  Neurology: Alert and oriented times three.       Labs:                        8.8    11.11 )-----------( 198      ( 26 Dec 2023 00:32 )             26.6     12-    140  |  105  |  26<H>  ----------------------------<  117<H>  3.7   |  20<L>  |  1.41<H>    Ca    10.3      26 Dec 2023 00:31  Phos  3.1     12  Mg     2.3         TPro  6.6  /  Alb  4.3  /  TBili  0.4  /  DBili  x   /  AST  72<H>  /  ALT  33  /  AlkPhos  44      PT/INR - ( 26 Dec 2023 00:33 )   PT: 13.3 sec;   INR: 1.22 ratio         PTT - ( 26 Dec 2023 00:33 )  PTT:27.1 sec        Oxygen Saturation, Mixed: 80.2 ( @ 00:00)  Oxygen Saturation, Mixed: 74.0 ( @ 19:57)  Oxygen Saturation, Mixed: 71.7 ( @ 15:40)  Oxygen Saturation, Mixed: 74.1 ( @ 14:35)  Oxygen Saturation, Mixed: 82.2 ( @ 09:18)

## 2023-12-26 NOTE — CONSULT NOTE ADULT - PROBLEM SELECTOR PROBLEM 1
ARIC (acute kidney injury)
Cardiogenic shock
Type 2 diabetes mellitus with hyperglycemia

## 2023-12-26 NOTE — CONSULT NOTE ADULT - ASSESSMENT
57yo M w/ PMHx HTN, CAD s/p stent (2009), ICH (2008), ICM now s/p heart transplant on 12/25/2023. Endocrine consulted for T2DM management.     #T2DM (A1c  8.3%)  Home meds: unclear, per med rec patient on dapagliflozin 10mg daily, ozempic 1mg weekly, however daughter thinks ozempic was discontinued ? Need proper med rec after patient is extubated or if can contact PCP  - c/w insulin drip for now as patient is POD1, intubated, and on high doses of steroids  - will reassess insulin requirements daily   - please notify us if your team desires to transition patient from insulin drip to basal-bolus  - Steroids: s/p solumed 500mg on 12/25/23 now on solumed taper  - DISCHARGE RECOMMENDATIONS: depending on steroid regimen on discharge and isnulin requirements basal-bolus regimen, TBD.  - OUT-PATIENT FOLLOW UP: Patient should follow up with PCP vs Endocrinology depending on insulin requirement (pt does not have endocrinolgist)    #HLD  - patient not on statin  - goal LDL in diabetes mellitus is <70    #HTN  - patient on losartan 25mg daily at home  - goal BP in diabetes mellitus is <130/80  - defer to primary team for management    Richie Angel MD  Endocrine Fellow  Can be reached via [employer] teams.    For follow up questions, discharge recommendations, or new consults, please email [email or web address] ([email or web address]) or [email or web address] ([email or web address]) or call answering service at [phone] (weekdays); [phone] (nights/weekends).  For emergiencies please page fellow on call.     59yo M w/ PMHx HTN, CAD s/p stent (2009), ICH (2008), ICM now s/p heart transplant on 12/25/2023. Endocrine consulted for T2DM management.     #T2DM (A1c  8.3%)  Home meds: unclear, per med rec patient on dapagliflozin 10mg daily, ozempic 1mg weekly, however daughter thinks ozempic was discontinued ? Need proper med rec after patient is extubated or if can contact PCP  - c/w insulin drip for now as patient is POD1, intubated, and on high doses of steroids  - will reassess insulin requirements daily   - please notify us if your team desires to transition patient from insulin drip to basal-bolus  - Steroids: s/p solumed 500mg on 12/25/23 now on solumed taper  - DISCHARGE RECOMMENDATIONS: depending on steroid regimen on discharge and isnulin requirements basal-bolus regimen, TBD.  - OUT-PATIENT FOLLOW UP: Patient should follow up with PCP vs Endocrinology depending on insulin requirement (pt does not have endocrinolgist)    #HLD  - patient not on statin  - goal LDL in diabetes mellitus is <70    #HTN  - patient on losartan 25mg daily at home  - goal BP in diabetes mellitus is <130/80  - defer to primary team for management    Richie Angel MD  Endocrine Fellow  Can be reached via [employer] teams.    For follow up questions, discharge recommendations, or new consults, please email [email or web address] ([facility]) or [email or web address] ([facility]) or call answering service at [phone] (weekdays); [phone] (nights/weekends).  For emergiencies please page fellow on call.

## 2023-12-26 NOTE — PROGRESS NOTE ADULT - PROBLEM SELECTOR PLAN 5
BCx from 11/17 with GPC in pair/chains in both bottles; Mixed cultures Staph epi and Enterococcus faecalis . Repeat cultures from 11/18; NGTD.  Has remote Hx PCN allergy per ID but unclear as he doesn't recall reaction.   -s/p IABP exchange from RFA to LFA on 11/20.  + rods in blood culture on 11/30 (reported on 12/4), repeat BCxs NGTD. Completed abx course.   appreciated transplant ID recs. - pretransplant was noted to have positive BCx staph epi, Enterococcus faecalis and Cutibacterium   - s/p IABP exchange from RFA to LFA on 11/20.  - appreciated transplant ID recs.  - post transplant has been afebrile  - remains on IV abx

## 2023-12-26 NOTE — PROGRESS NOTE ADULT - SUBJECTIVE AND OBJECTIVE BOX
LD VALENCIA  58y Male  MRN:[id number]    Patient is a 58y old  Male who presents with a chief complaint of NSTEMI (01 Nov 2023 20:29)    HPI:  59yo M w/ hx HTN, CAD w/ 1 stent in 2009, ICH (2008) presenting with abn ekg. Patient presented to [facility] where he was found to have STEMI, recommended to get cath however patient did not want to get it there so it left and came here.  Patient initially had cough, congestion, fever, was placed on antibiotics on Sunday.  Started feeling nauseous and had a presyncopal event after which he presented to ED last night.  Had chest pain as well.  Chest pain is midsternal.  Not currently having chest pain.  Received 4 aspirin 30 min pta. (01 Nov 2023 15:11)      Patient seen and evaluated at bedside in CTU. interval events noted    Interval HPI: extubated this am. iabp currently being removed        PAST MEDICAL & SURGICAL HISTORY:  HTN (hypertension)      CAD (coronary artery disease)  2009; stent      Intracranial hemorrhage  2008      Respiratory arrest  december 1st      Myocardial infarction, unspecified MI type, unspecified artery      History of coronary artery stent placement          REVIEW OF SYSTEMS:  as per hpi     VITALS:   ICU Vital Signs Last 24 Hrs  T(C): 36 (26 Dec 2023 12:00), Max: 37 (25 Dec 2023 16:00)  T(F): 96.8 (26 Dec 2023 12:00), Max: 98.6 (25 Dec 2023 16:00)  HR: 88 (26 Dec 2023 13:45) (80 - 90)  BP: --  BP(mean): --  ABP: 108/59 (26 Dec 2023 13:45) (35/29 - 146/68)  ABP(mean): 76 (26 Dec 2023 13:45) (32 - 108)  RR: 10 (26 Dec 2023 13:45) (10 - 46)  SpO2: 99% (26 Dec 2023 13:45) (95% - 100%)    O2 Parameters below as of 26 Dec 2023 12:00  Patient On (Oxygen Delivery Method): nasal cannula, high flow  O2 Flow (L/min): 50  O2 Concentration (%): 50          PHYSICAL EXAM:  GENERAL: NAD, comfortable. on high flow   HEAD:  Atraumatic, Normocephalic  EYES: EOMI, PERRLA, conjunctiva and sclera clear  NECK:  No JVD.   CHEST/LUNG: Clear to auscultation bilaterally; No wheeze  HEART: Regular rate and rhythm; No murmurs, rubs, or gallops  ABDOMEN: Soft, Nontender, Nondistended; Bowel sounds present  EXTREMITIES:  2+ Peripheral Pulses, No clubbing, cyanosis, or edema  NEUROLOGY: a0x3          Consultant(s) Notes Reviewed:  [x ] YES  [ ] NO  Care Discussed with Consultants/Other Providers [ x] YES  [ ] NO    MEDS:   MEDICATIONS  (STANDING):  cefepime   IVPB 1000 milliGRAM(s) IV Intermittent every 8 hours  chlorhexidine 4% Liquid 1 Application(s) Topical <User Schedule>  DAPTOmycin IVPB 500 milliGRAM(s) IV Intermittent <User Schedule>  dexMEDEtomidine Infusion 0.5 MICROgram(s)/kG/Hr (12.4 mL/Hr) IV Continuous <Continuous>  dextrose 50% Injectable 50 milliLiter(s) IV Push every 15 minutes  EPINEPHrine    Infusion 0.02 MICROgram(s)/kG/Min (7.42 mL/Hr) IV Continuous <Continuous>  gabapentin 100 milliGRAM(s) Oral every 8 hours  heparin   Injectable 5000 Unit(s) SubCutaneous every 8 hours  insulin regular Infusion 3 Unit(s)/Hr (3 mL/Hr) IV Continuous <Continuous>  lidocaine   4% Patch 3 Patch Transdermal daily  methocarbamol 500 milliGRAM(s) Oral every 8 hours  methylPREDNISolone sodium succinate Injectable   IV Push   methylPREDNISolone sodium succinate Injectable 40 milliGRAM(s) IV Push every 12 hours  metoclopramide Injectable 10 milliGRAM(s) IV Push every 8 hours  milrinone Infusion 0.25 MICROgram(s)/kG/Min (7.42 mL/Hr) IV Continuous <Continuous>  mupirocin 2% Ointment 1 Application(s) Topical two times a day  mycophenolate mofetil IVPB 1000 milliGRAM(s) IV Intermittent <User Schedule>  naloxegol 25 milliGRAM(s) Oral daily  niCARdipine Infusion 3 mG/Hr (15 mL/Hr) IV Continuous <Continuous>  nystatin    Suspension 656314 Unit(s) Oral <User Schedule>  pantoprazole  Injectable 40 milliGRAM(s) IV Push daily  polyethylene glycol 3350 17 Gram(s) Oral daily  senna 2 Tablet(s) Oral at bedtime  simethicone 160 milliGRAM(s) Chew every 8 hours  sodium chloride 0.9%. 1000 milliLiter(s) (10 mL/Hr) IV Continuous <Continuous>  tacrolimus 0.5 milliGRAM(s) Oral <User Schedule>  tiotropium 2.5 MICROgram(s) Inhaler 2 Puff(s) Inhalation daily  traMADol 25 milliGRAM(s) Oral every 8 hours  vancomycin    Solution 125 milliGRAM(s) Oral every 12 hours    MEDICATIONS  (PRN):      Allergies    penicillins (Unknown)    Intolerances        LABS:                                      8.8    11.11 )-----------( 198      ( 26 Dec 2023 00:32 )             26.6   12-26    140  |  105  |  26<H>  ----------------------------<  117<H>  3.7   |  20<L>  |  1.41<H>    Ca    10.3      26 Dec 2023 00:31  Phos  3.1     12-26  Mg     2.3     12-26    TPro  6.6  /  Alb  4.3  /  TBili  0.4  /  DBili  x   /  AST  72<H>  /  ALT  33  /  AlkPhos  44  12-26        < from: CT Abdomen and Pelvis No Cont (11.28.23 @ 03:38) >  IMPRESSION:  Mildly dilated colon and prominent but not overly dilated small bowel   with air-fluid levels. No discrete transition point. Findings are   suggestive of ileus.    Intra-aortic balloon pump with the inferior marker at the level of the   inferior mesenteric artery and the balloon overlying the origins of the   renal arteries, celiac artery, and superior mesenteric artery. Consider   repositioning. Concern for IABP positioning was discussed with LANETTE Hawthorne   on 11/28/2023 at 3:42 AM by Dr. Shepard.    < end of copied text >          < from: Colonoscopy (11.17.23 @ 10:35) >                                                                                                        Impression:          - The entire examined colon is normal on direct and retroflexion views.                       - No specimens collected.  Recommendation:      - Resume previous diet today.                       - No large polyps or masses detected, No objection from GI standpoint to                 proceed with heart transplantation/advanced heart therapies.                       - Please call back should any further questions or concerns arise.    < end of copied text >     < from: Xray Chest 1 View- PORTABLE-Urgent (11.01.23 @ 07:42) >    IMPRESSION:  Clear lungs.    ---End of Report ---        < end of copied text >  < from: TTE W or WO Ultrasound Enhancing Agent (11.01.23 @ 10:23) >  _____________________________     CONCLUSIONS:      1. Left ventricular cavity is moderately dilated. Left ventricular wall thickness is normal. Left ventricular systolic function is severely decreased with an ejection fraction of 32 % by Chinchilla's method of disks. Regional wall motion abnormalities present.   2. Multiple segmental abnormalities exist. See findings.   3. There is moderate (grade 2) left ventricular diastolic dysfunction, with indeterminant filling pressure.   4. Normal right ventricular cavity size, wall thickness, and systolic function.   5. No significant valvular disease.   6. No pericardial effusion seen.   7. Compared to the transthoracic echocardiogram performed on 1/25/2017 the areas of akinesis are unchanged but there has been a decline in LV systolic function with new areas of hypokinesis.    __________________________________________________________________    < end of copied text >  < from: TTE Limited W or WO Ultrasound Enhancing Agent (11.02.23 @ 07:41) >  __________________________     CONCLUSIONS:      1. After obtaining consent, Definity ultrasound enhancing agent was given for enhanced left ventricular opacification and improved delineation of the left ventricular endocardial borders. Left ventricular systolic function is severely decreased with a calculated ejection fraction of 22 % by the Chinchilla's biplane method of disks. There is a left ventricular thrombus.   2. Findings were discussed with Litzy BOSS on 11/2/2023 at 8.49am.   3. There is a left ventricular thrombus.    _________________________________________________________________    < end of copied text >   LD VALENCIA  58y Male  MRN:[id number]    Patient is a 58y old  Male who presents with a chief complaint of NSTEMI (01 Nov 2023 20:29)    HPI:  59yo M w/ hx HTN, CAD w/ 1 stent in 2009, ICH (2008) presenting with abn ekg. Patient presented to [facility] where he was found to have STEMI, recommended to get cath however patient did not want to get it there so it left and came here.  Patient initially had cough, congestion, fever, was placed on antibiotics on Sunday.  Started feeling nauseous and had a presyncopal event after which he presented to ED last night.  Had chest pain as well.  Chest pain is midsternal.  Not currently having chest pain.  Received 4 aspirin 30 min pta. (01 Nov 2023 15:11)      Patient seen and evaluated at bedside in CTU. interval events noted    Interval HPI: extubated this am. iabp currently being removed        PAST MEDICAL & SURGICAL HISTORY:  HTN (hypertension)      CAD (coronary artery disease)  2009; stent      Intracranial hemorrhage  2008      Respiratory arrest  december 1st      Myocardial infarction, unspecified MI type, unspecified artery      History of coronary artery stent placement          REVIEW OF SYSTEMS:  as per hpi     VITALS:   ICU Vital Signs Last 24 Hrs  T(C): 36 (26 Dec 2023 12:00), Max: 37 (25 Dec 2023 16:00)  T(F): 96.8 (26 Dec 2023 12:00), Max: 98.6 (25 Dec 2023 16:00)  HR: 88 (26 Dec 2023 13:45) (80 - 90)  BP: --  BP(mean): --  ABP: 108/59 (26 Dec 2023 13:45) (35/29 - 146/68)  ABP(mean): 76 (26 Dec 2023 13:45) (32 - 108)  RR: 10 (26 Dec 2023 13:45) (10 - 46)  SpO2: 99% (26 Dec 2023 13:45) (95% - 100%)    O2 Parameters below as of 26 Dec 2023 12:00  Patient On (Oxygen Delivery Method): nasal cannula, high flow  O2 Flow (L/min): 50  O2 Concentration (%): 50          PHYSICAL EXAM:  GENERAL: NAD, comfortable. on high flow   HEAD:  Atraumatic, Normocephalic  EYES: EOMI, PERRLA, conjunctiva and sclera clear  NECK:  No JVD.   CHEST/LUNG: Clear to auscultation bilaterally; No wheeze  HEART: Regular rate and rhythm; No murmurs, rubs, or gallops  ABDOMEN: Soft, Nontender, Nondistended; Bowel sounds present  EXTREMITIES:  2+ Peripheral Pulses, No clubbing, cyanosis, or edema  NEUROLOGY: a0x3          Consultant(s) Notes Reviewed:  [x ] YES  [ ] NO  Care Discussed with Consultants/Other Providers [ x] YES  [ ] NO    MEDS:   MEDICATIONS  (STANDING):  cefepime   IVPB 1000 milliGRAM(s) IV Intermittent every 8 hours  chlorhexidine 4% Liquid 1 Application(s) Topical <User Schedule>  DAPTOmycin IVPB 500 milliGRAM(s) IV Intermittent <User Schedule>  dexMEDEtomidine Infusion 0.5 MICROgram(s)/kG/Hr (12.4 mL/Hr) IV Continuous <Continuous>  dextrose 50% Injectable 50 milliLiter(s) IV Push every 15 minutes  EPINEPHrine    Infusion 0.02 MICROgram(s)/kG/Min (7.42 mL/Hr) IV Continuous <Continuous>  gabapentin 100 milliGRAM(s) Oral every 8 hours  heparin   Injectable 5000 Unit(s) SubCutaneous every 8 hours  insulin regular Infusion 3 Unit(s)/Hr (3 mL/Hr) IV Continuous <Continuous>  lidocaine   4% Patch 3 Patch Transdermal daily  methocarbamol 500 milliGRAM(s) Oral every 8 hours  methylPREDNISolone sodium succinate Injectable   IV Push   methylPREDNISolone sodium succinate Injectable 40 milliGRAM(s) IV Push every 12 hours  metoclopramide Injectable 10 milliGRAM(s) IV Push every 8 hours  milrinone Infusion 0.25 MICROgram(s)/kG/Min (7.42 mL/Hr) IV Continuous <Continuous>  mupirocin 2% Ointment 1 Application(s) Topical two times a day  mycophenolate mofetil IVPB 1000 milliGRAM(s) IV Intermittent <User Schedule>  naloxegol 25 milliGRAM(s) Oral daily  niCARdipine Infusion 3 mG/Hr (15 mL/Hr) IV Continuous <Continuous>  nystatin    Suspension 982376 Unit(s) Oral <User Schedule>  pantoprazole  Injectable 40 milliGRAM(s) IV Push daily  polyethylene glycol 3350 17 Gram(s) Oral daily  senna 2 Tablet(s) Oral at bedtime  simethicone 160 milliGRAM(s) Chew every 8 hours  sodium chloride 0.9%. 1000 milliLiter(s) (10 mL/Hr) IV Continuous <Continuous>  tacrolimus 0.5 milliGRAM(s) Oral <User Schedule>  tiotropium 2.5 MICROgram(s) Inhaler 2 Puff(s) Inhalation daily  traMADol 25 milliGRAM(s) Oral every 8 hours  vancomycin    Solution 125 milliGRAM(s) Oral every 12 hours    MEDICATIONS  (PRN):      Allergies    penicillins (Unknown)    Intolerances        LABS:                                      8.8    11.11 )-----------( 198      ( 26 Dec 2023 00:32 )             26.6   12-26    140  |  105  |  26<H>  ----------------------------<  117<H>  3.7   |  20<L>  |  1.41<H>    Ca    10.3      26 Dec 2023 00:31  Phos  3.1     12-26  Mg     2.3     12-26    TPro  6.6  /  Alb  4.3  /  TBili  0.4  /  DBili  x   /  AST  72<H>  /  ALT  33  /  AlkPhos  44  12-26        < from: CT Abdomen and Pelvis No Cont (11.28.23 @ 03:38) >  IMPRESSION:  Mildly dilated colon and prominent but not overly dilated small bowel   with air-fluid levels. No discrete transition point. Findings are   suggestive of ileus.    Intra-aortic balloon pump with the inferior marker at the level of the   inferior mesenteric artery and the balloon overlying the origins of the   renal arteries, celiac artery, and superior mesenteric artery. Consider   repositioning. Concern for IABP positioning was discussed with LANETTE Hawthorne   on 11/28/2023 at 3:42 AM by Dr. Shepard.    < end of copied text >          < from: Colonoscopy (11.17.23 @ 10:35) >                                                                                                        Impression:          - The entire examined colon is normal on direct and retroflexion views.                       - No specimens collected.  Recommendation:      - Resume previous diet today.                       - No large polyps or masses detected, No objection from GI standpoint to                 proceed with heart transplantation/advanced heart therapies.                       - Please call back should any further questions or concerns arise.    < end of copied text >     < from: Xray Chest 1 View- PORTABLE-Urgent (11.01.23 @ 07:42) >    IMPRESSION:  Clear lungs.    ---End of Report ---        < end of copied text >  < from: TTE W or WO Ultrasound Enhancing Agent (11.01.23 @ 10:23) >  _____________________________     CONCLUSIONS:      1. Left ventricular cavity is moderately dilated. Left ventricular wall thickness is normal. Left ventricular systolic function is severely decreased with an ejection fraction of 32 % by Chinchilla's method of disks. Regional wall motion abnormalities present.   2. Multiple segmental abnormalities exist. See findings.   3. There is moderate (grade 2) left ventricular diastolic dysfunction, with indeterminant filling pressure.   4. Normal right ventricular cavity size, wall thickness, and systolic function.   5. No significant valvular disease.   6. No pericardial effusion seen.   7. Compared to the transthoracic echocardiogram performed on 1/25/2017 the areas of akinesis are unchanged but there has been a decline in LV systolic function with new areas of hypokinesis.    __________________________________________________________________    < end of copied text >  < from: TTE Limited W or WO Ultrasound Enhancing Agent (11.02.23 @ 07:41) >  __________________________     CONCLUSIONS:      1. After obtaining consent, Definity ultrasound enhancing agent was given for enhanced left ventricular opacification and improved delineation of the left ventricular endocardial borders. Left ventricular systolic function is severely decreased with a calculated ejection fraction of 22 % by the Chinchilla's biplane method of disks. There is a left ventricular thrombus.   2. Findings were discussed with Litzy BOSS on 11/2/2023 at 8.49am.   3. There is a left ventricular thrombus.    _________________________________________________________________    < end of copied text >

## 2023-12-26 NOTE — PROGRESS NOTE ADULT - PROBLEM SELECTOR PLAN 4
- Cr on admission 1.2, peaked to 4, now at relative baseline  - Support as above.  -monitor closely  - Appreciate CardioRenal input. - pre transplant had baseline Cr 1.2, peaked to 4, now at relative baseline  - cardiorenal following  - diuretics as stated above   - monitor closely

## 2023-12-26 NOTE — PROGRESS NOTE ADULT - CRITICAL CARE ATTENDING COMMENT
ABO: A, listed status 2 PAR 0%  s/p OHT 12/25, DBD donor, IST: 253mins,   Hemo's RA 12, PA: 32/14(22), PA sat: 79%, Juan CO/CI:   IABP left in from pre op, wean IABP and plan to remove today  wean Sabrina, extubated overnight  Wean Epi after   cont milrinone  diuretics to keep Net neg 1lit    IS: s/p simulect , next dose pod 4  Will start tacrolimus  cont cellcept and solumedrol stacie    OI: CMV -/+ and toxo -/-  Will start nystatin   cont umm op abx    Blood Sugar management    IS  DVT PPX  GI PPX  feed as tolerated

## 2023-12-26 NOTE — PROGRESS NOTE ADULT - ASSESSMENT
57 yo M with PMHx of HTN, CAD w/ 1 stent in 2009, ICH (2008) presented on 11/1 with abn ekg. Patient presented to [facility] where he was found to have STEMI, recommended to get cath however patient did not want to get it there so he left and came here.   EKG here with ST depression in lateral leads and elevation in anterior leads   Prior to C found to be tachycardic, dyspneic, intubated   [facility] 11/1 with chronic total occlusion of LAD and RCA, with elevated RA and PA pressures  TTE 11/1 with severely decreased EF 32%, s/p IABP 11/1        #s/p OHT 12/25/23 CMV D-/R+; Toxo D-/R-  Induction simulect and MPN  Maintenance tacro/MMF  ·  Plan: - CMV -/+: intermediate risk.  Will need to start on valganciclovir when able X 6 months.    - Toxo -/-: none required  - PJP ppx: will introduce eventually   - Trush ppx: eventual Nystatin.  - donor HCV TIM( -) but HCV antibody +.  - on daptomycin ppx for hx of rash to vancomycin  - donor sputum + for staph but with would not influence risk of infection in heart recipient      #pretransplant E faecalis bacteremia in c/o colonoscopy    #Rash- to vancomycin?  resolved    # hep B and Hep A not immune  received vaccine series but second dose given early  will repeat ab titers    # Bacteremia.   ·  Plan: BCx from 11/17 with GPC in pair/chains in both bottles; Mixed cultures Staph epi and Enterococcus faecalis . Repeat cultures from 11/18; NGTD  + rods in blood culture on 11/30 (reported on 12/4= cutibacterium), repeat BCxs Negative Completed abx course.   receiving Daptomycin perioperative prophylaxis      Recommendations  ·	Complete perioperative ppx cefepime and daptomycin for 48 hours  ·	Donor with sputum staphylococcus- in the absence of donor bacteremia would not affect heart recipient outcomes and would not need further prophylaxis  ·	Valcyte and bactrim ppx as soon as able        Thank you for involving us in the care of this patient  Transplant ID will continue to follow  Please call or page with additional questions  Pager; #[phone]  Teams: from 8 am to 5 pm  Brandi Silva MD     57 yo M with PMHx of HTN, CAD w/ 1 stent in 2009, ICH (2008) presented on 11/1 with abn ekg. Patient presented to [facility] where he was found to have STEMI, recommended to get cath however patient did not want to get it there so he left and came here.   EKG here with ST depression in lateral leads and elevation in anterior leads   Prior to C found to be tachycardic, dyspneic, intubated   [facility] 11/1 with chronic total occlusion of LAD and RCA, with elevated RA and PA pressures  TTE 11/1 with severely decreased EF 32%, s/p IABP 11/1        #s/p OHT 12/25/23 CMV D-/R+; Toxo D-/R-  Induction simulect and MPN  Maintenance tacro/MMF  ·  Plan: - CMV -/+: intermediate risk.  Will need to start on valganciclovir when able X 6 months.    - Toxo -/-: none required  - PJP ppx: will introduce eventually   - Trush ppx: eventual Nystatin.  - donor HCV TIM( -) but HCV antibody +.  - on daptomycin ppx for hx of rash to vancomycin  - donor sputum + for staph but with would not influence risk of infection in heart recipient      #pretransplant E faecalis bacteremia in c/o colonoscopy    #Rash- to vancomycin?  resolved    # hep B and Hep A not immune  received vaccine series but second dose given early  will repeat ab titers    # Bacteremia.   ·  Plan: BCx from 11/17 with GPC in pair/chains in both bottles; Mixed cultures Staph epi and Enterococcus faecalis . Repeat cultures from 11/18; NGTD  + rods in blood culture on 11/30 (reported on 12/4= cutibacterium), repeat BCxs Negative Completed abx course.   receiving Daptomycin perioperative prophylaxis      Recommendations  ·	Complete perioperative ppx cefepime and daptomycin for 48 hours  ·	Donor with sputum staphylococcus- in the absence of donor bacteremia would not affect heart recipient outcomes and would not need further prophylaxis  ·	Valcyte and bactrim ppx as soon as able        Thank you for involving us in the care of this patient  Transplant ID will continue to follow  Please call or page with additional questions  Pager; #[phone]  Teams: from 8 am to 5 pm  Brandi Silva MD

## 2023-12-26 NOTE — CONSULT NOTE ADULT - ATTENDING COMMENTS
Agree with Dr. Angel's assessment and plan as outlined above. Reviewed all pertinent labs, and imaging studies. Modifications made as indicated above. 58 M with history of HTN, CAD, ICH here for STEMI now s/p heart transplant on 12/25/2023. Endocrine consulted for T2D management. A1C 8.3. Home DM meds: unclear, per med rec, patient on farxiga 10 mg daily, ozempic 1 mg weekly but may not be on it? Currently POD1 on insulin drip and high doses of methylpred. Would continue with insulin drip at this time as patient not eating yet. Transition to basal/bolus depending on drip requirement. Rest of the plan as above.  Complex case, high risk patient, high-level decision-making, requiring ICU level of care.

## 2023-12-26 NOTE — PROGRESS NOTE ADULT - PROBLEM SELECTOR PLAN 2
- will give Simulect for POD 0 and 4.  - Steroid taper per protocol   - Cellcept 1g BID  - tacrolimus to be initiated tonight if renal function stable. - s/p Simulect on POD 0, plan to receive second dose on POD 4 (12/29).  - Continue with Steroid taper per protocol   - Cellcept 1g BID  - plan to start tacro tonight, goal will be 12-14

## 2023-12-26 NOTE — CONSULT NOTE ADULT - SUBJECTIVE AND OBJECTIVE BOX
Richie Angel MD   |   PGY-4  Endocrinology Fellow  Available on [employer] Teams    HPI:  pt seen and approx 1:30 pm  in [facility]    59yo M w/ hx HTN, CAD w/ 1 stent in 2009, ICH (2008) presenting with abn ekg. Patient presented to [facility] where he was found to have STEMI, recommended to get cath however patient did not want to get it there so it left and came here.  Patient initially had cough, congestion, fever, was placed on antibiotics on Sunday.  Started feeling nauseous and had a presyncopal event after which he presented to ED last night.  Had chest pain as well.  Chest pain is midsternal.  Not currently having chest pain.  Received 4 aspirin 30 min pta. (01 Nov 2023 15:11)      Endocrine History:  Endocrine consulted for T2DM management.     Patient is intubated, called daughter and limited information provided. Tried to contact PCP (Dr. Brody Banuelos) with no success. History is limited   --Type of Diabetes: prediabetes vs T2DM (a1c 8.3% in 11/2023)   --How long diabetes: ~5 years   --Endocrinologist: does not have, follows with PCP   --Outpatient meds: unclear, per med rec aptient on dapagliflozin 10mg daily, ozempic 1mg weekly, however daughter thinks ozempic was discontinued ? Need proper med rec after patient is extubated or if can contact PCP   --live with: wife + sons   --Macrovascular complications: +CAD   --Family history: denies   --Statin: not on   --ACE/ARB: losartan 25mg daily   --Insurance: [employer] employee   --Inpatient Diet? NPO   --Inpatient diabetes regimen? Insulin drip   --On steroids inpatient? s/p solumed 500mg on 12/25/23 now on solumed taper   --On dextrose fluids inpatient? Not on    --BMI: 32.2     --weight: 98.9   --GFR? 57     PAST MEDICAL & SURGICAL HISTORY:  HTN (hypertension)      CAD (coronary artery disease)  2009; stent      Intracranial hemorrhage  2008      Respiratory arrest  december 1st      Myocardial infarction, unspecified MI type, unspecified artery      History of coronary artery stent placement          FAMILY HISTORY:  Family history of meningitis (Sibling)  Brother, death at age 49 from complications of infection (June 2015)    Family history of hypertension in mother  Mother        Home Medications:  Albuterol (Eqv-ProAir HFA) 90 mcg/inh inhalation aerosol: 2 puff(s) inhaled every 6 hours as needed (01 Nov 2023 11:15)  carvedilol 25 mg oral tablet: 1 tab(s) orally 2 times a day (01 Nov 2023 11:06)  clopidogrel 75 mg oral tablet: 1 tab(s) orally once a day (01 Nov 2023 11:11)  dapagliflozin 10 mg oral tablet: 1 tab(s) orally once a day (in the morning) (01 Nov 2023 11:10)  hydroCHLOROthiazide 25 mg oral tablet: 1 tab(s) orally once a day (in the morning) (01 Nov 2023 11:06)  ipratropium-albuterol 0.5 mg-2.5 mg/3 mL inhalation solution: 3 milliliter(s) by nebulizer once a day (01 Nov 2023 11:13)  levoFLOXacin 500 mg oral tablet: 1 tab(s) orally once a day for 10 days (01 Nov 2023 11:09)  losartan 25 mg oral tablet: 1 tab(s) orally once a day (01 Nov 2023 11:06)  montelukast 10 mg oral tablet: 1 tab(s) orally once a day (01 Nov 2023 11:06)  Ozempic 4 mg/3 mL (1 mg dose) subcutaneous solution: 1 milligram(s) subcutaneously once a week (01 Nov 2023 11:14)  Trelegy Ellipta 100 mcg-62.5 mcg-25 mcg/inh inhalation powder: 2 puff(s) inhaled 2 times a day NOTE: As per Pharmacy, last filled 10/22 (01 Nov 2023 11:10)      MEDICATIONS  (STANDING):  cefepime   IVPB 1000 milliGRAM(s) IV Intermittent every 8 hours  chlorhexidine 4% Liquid 1 Application(s) Topical <User Schedule>  DAPTOmycin IVPB 500 milliGRAM(s) IV Intermittent <User Schedule>  dexMEDEtomidine Infusion 0.5 MICROgram(s)/kG/Hr (12.4 mL/Hr) IV Continuous <Continuous>  dextrose 50% Injectable 50 milliLiter(s) IV Push every 15 minutes  gabapentin 100 milliGRAM(s) Oral every 8 hours  heparin   Injectable 5000 Unit(s) SubCutaneous every 8 hours  insulin regular Infusion 3 Unit(s)/Hr (3 mL/Hr) IV Continuous <Continuous>  lidocaine   4% Patch 3 Patch Transdermal daily  methocarbamol 500 milliGRAM(s) Oral every 8 hours  methylPREDNISolone sodium succinate Injectable   IV Push   methylPREDNISolone sodium succinate Injectable 40 milliGRAM(s) IV Push every 12 hours  metoclopramide Injectable 10 milliGRAM(s) IV Push every 8 hours  milrinone Infusion 0.25 MICROgram(s)/kG/Min (7.42 mL/Hr) IV Continuous <Continuous>  mupirocin 2% Ointment 1 Application(s) Topical two times a day  mycophenolate mofetil IVPB 1000 milliGRAM(s) IV Intermittent <User Schedule>  naloxegol 25 milliGRAM(s) Oral daily  niCARdipine Infusion 3 mG/Hr (15 mL/Hr) IV Continuous <Continuous>  nystatin    Suspension 121361 Unit(s) Oral <User Schedule>  pantoprazole  Injectable 40 milliGRAM(s) IV Push daily  polyethylene glycol 3350 17 Gram(s) Oral daily  senna 2 Tablet(s) Oral at bedtime  simethicone 160 milliGRAM(s) Chew every 8 hours  sodium chloride 0.9%. 1000 milliLiter(s) (10 mL/Hr) IV Continuous <Continuous>  tacrolimus 0.5 milliGRAM(s) SubLingual <User Schedule>  tiotropium 2.5 MICROgram(s) Inhaler 2 Puff(s) Inhalation daily  traMADol 25 milliGRAM(s) Oral every 8 hours  vancomycin    Solution 125 milliGRAM(s) Oral every 12 hours    MEDICATIONS  (PRN):      Allergies    penicillins (Unknown)    Intolerances      Review of Systems:  Unable to assess ROS d/t AMS.    ===================PHYSICAL EXAM=======================  VITALS: T(C): 36.5 (12-26-23 @ 18:00)  T(F): 97.7 (12-26-23 @ 18:00), Max: 98.6 (12-25-23 @ 20:00)  HR: 88 (12-26-23 @ 19:00) (80 - 90)  BP: --  RR:  (10 - 46)  SpO2:  (95% - 100%)  Wt(kg): --  GENERAL: intubated, sedated  EYES: No proptosis, no lid lag, anicteric  THYROID: Normal size, no palpable nodules  RESPIRATORY: Clear to auscultation bilaterally  CARDIOVASCULAR: Regular rate and rhythm  GI: Soft, nontender, non distended  EXT: b/l feet without wounds; 2+ pulses  PSYCH: intubated, sedated  ======================================================  POCT Blood Glucose.: 148 mg/dL (12-26-23 @ 18:42)  POCT Blood Glucose.: 95 mg/dL (12-26-23 @ 17:48)  POCT Blood Glucose.: 101 mg/dL (12-26-23 @ 16:44)  POCT Blood Glucose.: 103 mg/dL (12-26-23 @ 15:57)  POCT Blood Glucose.: 111 mg/dL (12-26-23 @ 14:52)  POCT Blood Glucose.: 118 mg/dL (12-26-23 @ 14:09)  POCT Blood Glucose.: 129 mg/dL (12-26-23 @ 13:08)  POCT Blood Glucose.: 143 mg/dL (12-26-23 @ 11:17)  POCT Blood Glucose.: 188 mg/dL (12-26-23 @ 09:55)  POCT Blood Glucose.: 168 mg/dL (12-26-23 @ 09:10)  POCT Blood Glucose.: 160 mg/dL (12-26-23 @ 08:06)  POCT Blood Glucose.: 157 mg/dL (12-26-23 @ 06:45)  POCT Blood Glucose.: 170 mg/dL (12-26-23 @ 05:47)  POCT Blood Glucose.: 179 mg/dL (12-26-23 @ 05:05)  POCT Blood Glucose.: 160 mg/dL (12-26-23 @ 03:52)  POCT Blood Glucose.: 97 mg/dL (12-26-23 @ 02:46)  POCT Blood Glucose.: 104 mg/dL (12-26-23 @ 01:57)  POCT Blood Glucose.: 114 mg/dL (12-26-23 @ 00:53)  POCT Blood Glucose.: 122 mg/dL (12-25-23 @ 23:56)  POCT Blood Glucose.: 131 mg/dL (12-25-23 @ 22:56)  POCT Blood Glucose.: 150 mg/dL (12-25-23 @ 21:58)  POCT Blood Glucose.: 147 mg/dL (12-25-23 @ 20:49)  POCT Blood Glucose.: 146 mg/dL (12-25-23 @ 19:52)  POCT Blood Glucose.: 159 mg/dL (12-25-23 @ 18:45)  POCT Blood Glucose.: 163 mg/dL (12-25-23 @ 18:11)  POCT Blood Glucose.: 181 mg/dL (12-25-23 @ 16:51)  POCT Blood Glucose.: 188 mg/dL (12-25-23 @ 15:47)  POCT Blood Glucose.: 208 mg/dL (12-25-23 @ 14:49)  POCT Blood Glucose.: 199 mg/dL (12-25-23 @ 13:54)  POCT Blood Glucose.: 215 mg/dL (12-25-23 @ 12:53)  POCT Blood Glucose.: 218 mg/dL (12-25-23 @ 11:58)  POCT Blood Glucose.: 215 mg/dL (12-25-23 @ 10:55)  POCT Blood Glucose.: 226 mg/dL (12-25-23 @ 09:57)  POCT Blood Glucose.: 102 mg/dL (12-24-23 @ 21:29)  POCT Blood Glucose.: 121 mg/dL (12-24-23 @ 16:15)  POCT Blood Glucose.: 86 mg/dL (12-24-23 @ 11:38)  POCT Blood Glucose.: 122 mg/dL (12-24-23 @ 08:11)  POCT Blood Glucose.: 143 mg/dL (12-23-23 @ 20:44)                            8.8    11.11 )-----------( 198      ( 26 Dec 2023 00:32 )             26.6       12-26    140  |  105  |  26<H>  ----------------------------<  117<H>  3.7   |  20<L>  |  1.41<H>    eGFR: 57<L>    Ca    10.3      12-26  Mg     2.3     12-26  Phos  3.1     12-26    TPro  6.6  /  Alb  4.3  /  TBili  0.4  /  DBili  x   /  AST  72<H>  /  ALT  33  /  AlkPhos  44  12-26      Thyroid Function Tests:      A1C with Estimated Average Glucose Result: 8.3 % (11-01-23 @ 06:14)      11-10 Chol 130 Direct LDL -- LDL calculated 75 HDL 37<L> Trig 95, 11-03 Chol 198 Direct LDL -- LDL calculated 114<H> HDL 24<L> Trig 344<H>    Radiology:              Richie Angel MD   |   PGY-4  Endocrinology Fellow  Available on [employer] Teams    HPI:  pt seen and approx 1:30 pm  in [facility]    59yo M w/ hx HTN, CAD w/ 1 stent in 2009, ICH (2008) presenting with abn ekg. Patient presented to [facility] where he was found to have STEMI, recommended to get cath however patient did not want to get it there so it left and came here.  Patient initially had cough, congestion, fever, was placed on antibiotics on Sunday.  Started feeling nauseous and had a presyncopal event after which he presented to ED last night.  Had chest pain as well.  Chest pain is midsternal.  Not currently having chest pain.  Received 4 aspirin 30 min pta. (01 Nov 2023 15:11)      Endocrine History:  Endocrine consulted for T2DM management.     Patient is intubated, called daughter and limited information provided. Tried to contact PCP (Dr. Brody Banuelos) with no success. History is limited   --Type of Diabetes: prediabetes vs T2DM (a1c 8.3% in 11/2023)   --How long diabetes: ~5 years   --Endocrinologist: does not have, follows with PCP   --Outpatient meds: unclear, per med rec aptient on dapagliflozin 10mg daily, ozempic 1mg weekly, however daughter thinks ozempic was discontinued ? Need proper med rec after patient is extubated or if can contact PCP   --live with: wife + sons   --Macrovascular complications: +CAD   --Family history: denies   --Statin: not on   --ACE/ARB: losartan 25mg daily   --Insurance: [employer] employee   --Inpatient Diet? NPO   --Inpatient diabetes regimen? Insulin drip   --On steroids inpatient? s/p solumed 500mg on 12/25/23 now on solumed taper   --On dextrose fluids inpatient? Not on    --BMI: 32.2     --weight: 98.9   --GFR? 57     PAST MEDICAL & SURGICAL HISTORY:  HTN (hypertension)      CAD (coronary artery disease)  2009; stent      Intracranial hemorrhage  2008      Respiratory arrest  december 1st      Myocardial infarction, unspecified MI type, unspecified artery      History of coronary artery stent placement          FAMILY HISTORY:  Family history of meningitis (Sibling)  Brother, death at age 49 from complications of infection (June 2015)    Family history of hypertension in mother  Mother        Home Medications:  Albuterol (Eqv-ProAir HFA) 90 mcg/inh inhalation aerosol: 2 puff(s) inhaled every 6 hours as needed (01 Nov 2023 11:15)  carvedilol 25 mg oral tablet: 1 tab(s) orally 2 times a day (01 Nov 2023 11:06)  clopidogrel 75 mg oral tablet: 1 tab(s) orally once a day (01 Nov 2023 11:11)  dapagliflozin 10 mg oral tablet: 1 tab(s) orally once a day (in the morning) (01 Nov 2023 11:10)  hydroCHLOROthiazide 25 mg oral tablet: 1 tab(s) orally once a day (in the morning) (01 Nov 2023 11:06)  ipratropium-albuterol 0.5 mg-2.5 mg/3 mL inhalation solution: 3 milliliter(s) by nebulizer once a day (01 Nov 2023 11:13)  levoFLOXacin 500 mg oral tablet: 1 tab(s) orally once a day for 10 days (01 Nov 2023 11:09)  losartan 25 mg oral tablet: 1 tab(s) orally once a day (01 Nov 2023 11:06)  montelukast 10 mg oral tablet: 1 tab(s) orally once a day (01 Nov 2023 11:06)  Ozempic 4 mg/3 mL (1 mg dose) subcutaneous solution: 1 milligram(s) subcutaneously once a week (01 Nov 2023 11:14)  Trelegy Ellipta 100 mcg-62.5 mcg-25 mcg/inh inhalation powder: 2 puff(s) inhaled 2 times a day NOTE: As per Pharmacy, last filled 10/22 (01 Nov 2023 11:10)      MEDICATIONS  (STANDING):  cefepime   IVPB 1000 milliGRAM(s) IV Intermittent every 8 hours  chlorhexidine 4% Liquid 1 Application(s) Topical <User Schedule>  DAPTOmycin IVPB 500 milliGRAM(s) IV Intermittent <User Schedule>  dexMEDEtomidine Infusion 0.5 MICROgram(s)/kG/Hr (12.4 mL/Hr) IV Continuous <Continuous>  dextrose 50% Injectable 50 milliLiter(s) IV Push every 15 minutes  gabapentin 100 milliGRAM(s) Oral every 8 hours  heparin   Injectable 5000 Unit(s) SubCutaneous every 8 hours  insulin regular Infusion 3 Unit(s)/Hr (3 mL/Hr) IV Continuous <Continuous>  lidocaine   4% Patch 3 Patch Transdermal daily  methocarbamol 500 milliGRAM(s) Oral every 8 hours  methylPREDNISolone sodium succinate Injectable   IV Push   methylPREDNISolone sodium succinate Injectable 40 milliGRAM(s) IV Push every 12 hours  metoclopramide Injectable 10 milliGRAM(s) IV Push every 8 hours  milrinone Infusion 0.25 MICROgram(s)/kG/Min (7.42 mL/Hr) IV Continuous <Continuous>  mupirocin 2% Ointment 1 Application(s) Topical two times a day  mycophenolate mofetil IVPB 1000 milliGRAM(s) IV Intermittent <User Schedule>  naloxegol 25 milliGRAM(s) Oral daily  niCARdipine Infusion 3 mG/Hr (15 mL/Hr) IV Continuous <Continuous>  nystatin    Suspension 072482 Unit(s) Oral <User Schedule>  pantoprazole  Injectable 40 milliGRAM(s) IV Push daily  polyethylene glycol 3350 17 Gram(s) Oral daily  senna 2 Tablet(s) Oral at bedtime  simethicone 160 milliGRAM(s) Chew every 8 hours  sodium chloride 0.9%. 1000 milliLiter(s) (10 mL/Hr) IV Continuous <Continuous>  tacrolimus 0.5 milliGRAM(s) SubLingual <User Schedule>  tiotropium 2.5 MICROgram(s) Inhaler 2 Puff(s) Inhalation daily  traMADol 25 milliGRAM(s) Oral every 8 hours  vancomycin    Solution 125 milliGRAM(s) Oral every 12 hours    MEDICATIONS  (PRN):      Allergies    penicillins (Unknown)    Intolerances      Review of Systems:  Unable to assess ROS d/t AMS.    ===================PHYSICAL EXAM=======================  VITALS: T(C): 36.5 (12-26-23 @ 18:00)  T(F): 97.7 (12-26-23 @ 18:00), Max: 98.6 (12-25-23 @ 20:00)  HR: 88 (12-26-23 @ 19:00) (80 - 90)  BP: --  RR:  (10 - 46)  SpO2:  (95% - 100%)  Wt(kg): --  GENERAL: intubated, sedated  EYES: No proptosis, no lid lag, anicteric  THYROID: Normal size, no palpable nodules  RESPIRATORY: Clear to auscultation bilaterally  CARDIOVASCULAR: Regular rate and rhythm  GI: Soft, nontender, non distended  EXT: b/l feet without wounds; 2+ pulses  PSYCH: intubated, sedated  ======================================================  POCT Blood Glucose.: 148 mg/dL (12-26-23 @ 18:42)  POCT Blood Glucose.: 95 mg/dL (12-26-23 @ 17:48)  POCT Blood Glucose.: 101 mg/dL (12-26-23 @ 16:44)  POCT Blood Glucose.: 103 mg/dL (12-26-23 @ 15:57)  POCT Blood Glucose.: 111 mg/dL (12-26-23 @ 14:52)  POCT Blood Glucose.: 118 mg/dL (12-26-23 @ 14:09)  POCT Blood Glucose.: 129 mg/dL (12-26-23 @ 13:08)  POCT Blood Glucose.: 143 mg/dL (12-26-23 @ 11:17)  POCT Blood Glucose.: 188 mg/dL (12-26-23 @ 09:55)  POCT Blood Glucose.: 168 mg/dL (12-26-23 @ 09:10)  POCT Blood Glucose.: 160 mg/dL (12-26-23 @ 08:06)  POCT Blood Glucose.: 157 mg/dL (12-26-23 @ 06:45)  POCT Blood Glucose.: 170 mg/dL (12-26-23 @ 05:47)  POCT Blood Glucose.: 179 mg/dL (12-26-23 @ 05:05)  POCT Blood Glucose.: 160 mg/dL (12-26-23 @ 03:52)  POCT Blood Glucose.: 97 mg/dL (12-26-23 @ 02:46)  POCT Blood Glucose.: 104 mg/dL (12-26-23 @ 01:57)  POCT Blood Glucose.: 114 mg/dL (12-26-23 @ 00:53)  POCT Blood Glucose.: 122 mg/dL (12-25-23 @ 23:56)  POCT Blood Glucose.: 131 mg/dL (12-25-23 @ 22:56)  POCT Blood Glucose.: 150 mg/dL (12-25-23 @ 21:58)  POCT Blood Glucose.: 147 mg/dL (12-25-23 @ 20:49)  POCT Blood Glucose.: 146 mg/dL (12-25-23 @ 19:52)  POCT Blood Glucose.: 159 mg/dL (12-25-23 @ 18:45)  POCT Blood Glucose.: 163 mg/dL (12-25-23 @ 18:11)  POCT Blood Glucose.: 181 mg/dL (12-25-23 @ 16:51)  POCT Blood Glucose.: 188 mg/dL (12-25-23 @ 15:47)  POCT Blood Glucose.: 208 mg/dL (12-25-23 @ 14:49)  POCT Blood Glucose.: 199 mg/dL (12-25-23 @ 13:54)  POCT Blood Glucose.: 215 mg/dL (12-25-23 @ 12:53)  POCT Blood Glucose.: 218 mg/dL (12-25-23 @ 11:58)  POCT Blood Glucose.: 215 mg/dL (12-25-23 @ 10:55)  POCT Blood Glucose.: 226 mg/dL (12-25-23 @ 09:57)  POCT Blood Glucose.: 102 mg/dL (12-24-23 @ 21:29)  POCT Blood Glucose.: 121 mg/dL (12-24-23 @ 16:15)  POCT Blood Glucose.: 86 mg/dL (12-24-23 @ 11:38)  POCT Blood Glucose.: 122 mg/dL (12-24-23 @ 08:11)  POCT Blood Glucose.: 143 mg/dL (12-23-23 @ 20:44)                            8.8    11.11 )-----------( 198      ( 26 Dec 2023 00:32 )             26.6       12-26    140  |  105  |  26<H>  ----------------------------<  117<H>  3.7   |  20<L>  |  1.41<H>    eGFR: 57<L>    Ca    10.3      12-26  Mg     2.3     12-26  Phos  3.1     12-26    TPro  6.6  /  Alb  4.3  /  TBili  0.4  /  DBili  x   /  AST  72<H>  /  ALT  33  /  AlkPhos  44  12-26      Thyroid Function Tests:      A1C with Estimated Average Glucose Result: 8.3 % (11-01-23 @ 06:14)      11-10 Chol 130 Direct LDL -- LDL calculated 75 HDL 37<L> Trig 95, 11-03 Chol 198 Direct LDL -- LDL calculated 114<H> HDL 24<L> Trig 344<H>    Radiology:

## 2023-12-26 NOTE — CHART NOTE - NSCHARTNOTEFT_GEN_A_CORE
Nutrition Follow Up Note  Patient seen for: s/p heart transplant.   Chart reviewed, events noted.    Source: [x] Patient       [x] Medical Record      [x] RN        [] Family at bedside        [] Other:  - If unable to interview patient: [] Trach/Vent/BiPAP  [] Disoriented/confused/inappropriate to interview    Diet Order:   Diet, NPO (23)    Is current diet order appropriate/adequate? See recommendations below    PO intake :   [x] >75%  Adequate    [x] 50-75%  Fair       [] <50%  Poor   Per RN flowsheet pt with fair to good PO intake prior to transplant; eating some food from home. RD assessment limited as put s/p transplant yesterday (pain & lethargy).     Nutrition-related concerns:  - s/p heart transplant  with IABP. Ordered for steroid, Simulect, tacrolimus, Cellcept  - Milrinone and Epinephrine (0.02 micrograms/kG/min) for inotropic support.  - Stress Hyperglycemia; ordered for infusion of insulin     GI: Last BM: 12/23 x2. Bowel Regimen? [x] Yes  [] No  -> Ordered for Reglan     Weights:   Daily Weight in k.3 (-), 88.6 (-), 90.8 (-18), 88.9 (-)  Wt history: Pt reports UBW ~180-190 pounds; reports recent weights likely elevated secondary to fluid retention; On/off diuresis.   RD will continue to trend as new wts available/able.     Drug Dosing Weight  Height (cm): 175.3 (24 Dec 2023 15:23)  Weight (kg): 98.9 (24 Dec 2023 15:23)  BMI (kg/m2): 32.2 (24 Dec 2023 15:23)-> recalculated based on low on daily wt of 87.4 kG (-) (28.4 kG/m2).  Reported UBW: ~81-86 kG    Nutritionally Pertinent:   MEDICATIONS  (STANDING):  cefepime   IVPB 1000 milliGRAM(s) IV Intermittent every 8 hours  DAPTOmycin IVPB 500 milliGRAM(s) IV Intermittent <User Schedule>  dexMEDEtomidine Infusion 0.5 MICROgram(s)/kG/Hr (12.4 mL/Hr) IV Continuous <Continuous>  dextrose 50% Injectable 50 milliLiter(s) IV Push every 15 minutes  EPINEPHrine    Infusion 0.02 MICROgram(s)/kG/Min (7.42 mL/Hr) IV Continuous <Continuous>  gabapentin 100 milliGRAM(s) Oral every 8 hours  insulin regular Infusion 3 Unit(s)/Hr (3 mL/Hr) IV Continuous <Continuous>  methocarbamol 500 milliGRAM(s) Oral every 8 hours  methylPREDNISolone sodium succinate Injectable   IV Push   methylPREDNISolone sodium succinate Injectable 40 milliGRAM(s) IV Push every 12 hours  metoclopramide Injectable 10 milliGRAM(s) IV Push every 8 hours  milrinone Infusion 0.25 MICROgram(s)/kG/Min (7.42 mL/Hr) IV Continuous <Continuous>  mycophenolate mofetil IVPB 1000 milliGRAM(s) IV Intermittent <User Schedule>  naloxegol 25 milliGRAM(s) Oral daily  niCARdipine Infusion 3 mG/Hr (15 mL/Hr) IV Continuous <Continuous>  nystatin    Suspension 103495 Unit(s) Oral <User Schedule>  pantoprazole  Injectable 40 milliGRAM(s) IV Push daily  polyethylene glycol 3350 17 Gram(s) Oral daily  senna 2 Tablet(s) Oral at bedtime  simethicone 160 milliGRAM(s) Chew every 8 hours  sodium chloride 0.9%. 1000 milliLiter(s) (10 mL/Hr) IV Continuous <Continuous>  tacrolimus 0.5 milliGRAM(s) Oral <User Schedule>  traMADol 25 milliGRAM(s) Oral every 8 hours  vancomycin    Solution 125 milliGRAM(s) Oral every 12 hours    Pertinent Labs:  @ 00:31: Na 140, BUN 26<H>, Cr 1.41<H>, <H>, K+ 3.7, Phos 3.1, Mg 2.3, Alk Phos 44, ALT/SGPT 33, AST/SGOT 72<H>    A1C with Estimated Average Glucose Result: 8.3 % (23 @ 06:14)    Finger Sticks:  POCT Blood Glucose.: 168 mg/dL ( @ 09:10)  POCT Blood Glucose.: 160 mg/dL ( @ 08:06)  POCT Blood Glucose.: 157 mg/dL ( @ 06:45)  POCT Blood Glucose.: 170 mg/dL ( @ 05:47)  POCT Blood Glucose.: 179 mg/dL ( @ 05:05)  POCT Blood Glucose.: 160 mg/dL ( @ 03:52)  POCT Blood Glucose.: 97 mg/dL ( @ 02:46)  POCT Blood Glucose.: 104 mg/dL ( @ 01:57)  POCT Blood Glucose.: 114 mg/dL ( @ 00:53)  POCT Blood Glucose.: 122 mg/dL ( @ 23:56)  POCT Blood Glucose.: 131 mg/dL ( @ 22:56)  POCT Blood Glucose.: 150 mg/dL ( @ 21:58)  POCT Blood Glucose.: 147 mg/dL ( @ 20:49)  POCT Blood Glucose.: 146 mg/dL ( @ 19:52)  POCT Blood Glucose.: 159 mg/dL ( @ 18:45)  POCT Blood Glucose.: 163 mg/dL ( @ 18:11)  POCT Blood Glucose.: 181 mg/dL ( @ 16:51)  POCT Blood Glucose.: 188 mg/dL ( @ 15:47)  POCT Blood Glucose.: 208 mg/dL ( @ 14:49)  POCT Blood Glucose.: 199 mg/dL ( @ 13:54)  POCT Blood Glucose.: 215 mg/dL ( @ 12:53)  POCT Blood Glucose.: 218 mg/dL ( @ 11:58)  POCT Blood Glucose.: 215 mg/dL ( @ 10:55)  POCT Blood Glucose.: 226 mg/dL ( @ 09:57)    Skin per nursing documentation: No pressure injuries noted.  Edema per nursing documentation: 1+ generalized     Estimated Needs:   Based on low on daily wt of 87.4 kG (-03) with consideration for increased nutrient needs:  Estimated Caloric Needs: (26-31 kcal/kg): 4018-0481 kcal  Estimated Protein Needs: (1.3-1.6 g/kg): 114-140 gm  Defer fluid needs to team.    Previous Nutrition Diagnosis: Increased nutrient needs  Nutrition Diagnosis is: [x] ongoing - being addressed with adequate oral intake    Previous Nutrition Diagnosis: Inadequate energy intake  Nutrition Diagnosis is: [x] ongoing; pt appetite has improved    Previous Nutrition Diagnosis: Food & Nutrition-Related Knowledge Deficit   Nutrition Diagnosis is: [x] ongoing - being addressed with ongoing diet education PRN    New Nutrition Diagnosis: [x] N/A    Nutrition Care Plan:  [x] In Progress  [] Achieved  [] Not applicable    Nutrition Interventions:     Education Provided:       [x] Yes: 1. Food safety education re-discussed as pt listed for heart transplant with emphasis on cross contamination, importance of blood glucose control while on prednisone, drinking filtered water only, no ice during admission, property food temperatures. Pt was receptive to information and was able to use teach back points to verbalize understanding.     Recommendations:  1. When diet advanced, consider consistent carbohydrate.   -> Fluid needs deferred to provider. Consistency deferred to SLP and provider.   2. RD remains available to provide ongoing nutrition education PRN   3. Honor pt food preferences to promote adequate oral intake while in-house     Monitoring and Evaluation:   Continue to monitor nutritional intake, tolerance to diet prescription, weights, labs, skin integrity    RD remains available upon request and will follow up per protocol  Carolyne Clifford RD, MS, CDN, CNSC, [facility] TEAMS Nutrition Follow Up Note  Patient seen for: s/p heart transplant.   Chart reviewed, events noted.    Source: [x] Patient       [x] Medical Record      [x] RN        [] Family at bedside        [] Other:  - If unable to interview patient: [] Trach/Vent/BiPAP  [] Disoriented/confused/inappropriate to interview    Diet Order:   Diet, NPO (23)    Is current diet order appropriate/adequate? See recommendations below    PO intake :   [x] >75%  Adequate    [x] 50-75%  Fair       [] <50%  Poor   Per RN flowsheet pt with fair to good PO intake prior to transplant; eating some food from home. RD assessment limited as put s/p transplant yesterday (pain & lethargy).     Nutrition-related concerns:  - s/p heart transplant  with IABP. Ordered for steroid, Simulect, tacrolimus, Cellcept  - Milrinone and Epinephrine (0.02 micrograms/kG/min) for inotropic support.  - Stress Hyperglycemia; ordered for infusion of insulin     GI: Last BM: 12/23 x2. Bowel Regimen? [x] Yes  [] No  -> Ordered for Reglan     Weights:   Daily Weight in k.3 (-), 88.6 (-), 90.8 (-18), 88.9 (-)  Wt history: Pt reports UBW ~180-190 pounds; reports recent weights likely elevated secondary to fluid retention; On/off diuresis.   RD will continue to trend as new wts available/able.     Drug Dosing Weight  Height (cm): 175.3 (24 Dec 2023 15:23)  Weight (kg): 98.9 (24 Dec 2023 15:23)  BMI (kg/m2): 32.2 (24 Dec 2023 15:23)-> recalculated based on low on daily wt of 87.4 kG (-) (28.4 kG/m2).  Reported UBW: ~81-86 kG    Nutritionally Pertinent:   MEDICATIONS  (STANDING):  cefepime   IVPB 1000 milliGRAM(s) IV Intermittent every 8 hours  DAPTOmycin IVPB 500 milliGRAM(s) IV Intermittent <User Schedule>  dexMEDEtomidine Infusion 0.5 MICROgram(s)/kG/Hr (12.4 mL/Hr) IV Continuous <Continuous>  dextrose 50% Injectable 50 milliLiter(s) IV Push every 15 minutes  EPINEPHrine    Infusion 0.02 MICROgram(s)/kG/Min (7.42 mL/Hr) IV Continuous <Continuous>  gabapentin 100 milliGRAM(s) Oral every 8 hours  insulin regular Infusion 3 Unit(s)/Hr (3 mL/Hr) IV Continuous <Continuous>  methocarbamol 500 milliGRAM(s) Oral every 8 hours  methylPREDNISolone sodium succinate Injectable   IV Push   methylPREDNISolone sodium succinate Injectable 40 milliGRAM(s) IV Push every 12 hours  metoclopramide Injectable 10 milliGRAM(s) IV Push every 8 hours  milrinone Infusion 0.25 MICROgram(s)/kG/Min (7.42 mL/Hr) IV Continuous <Continuous>  mycophenolate mofetil IVPB 1000 milliGRAM(s) IV Intermittent <User Schedule>  naloxegol 25 milliGRAM(s) Oral daily  niCARdipine Infusion 3 mG/Hr (15 mL/Hr) IV Continuous <Continuous>  nystatin    Suspension 236138 Unit(s) Oral <User Schedule>  pantoprazole  Injectable 40 milliGRAM(s) IV Push daily  polyethylene glycol 3350 17 Gram(s) Oral daily  senna 2 Tablet(s) Oral at bedtime  simethicone 160 milliGRAM(s) Chew every 8 hours  sodium chloride 0.9%. 1000 milliLiter(s) (10 mL/Hr) IV Continuous <Continuous>  tacrolimus 0.5 milliGRAM(s) Oral <User Schedule>  traMADol 25 milliGRAM(s) Oral every 8 hours  vancomycin    Solution 125 milliGRAM(s) Oral every 12 hours    Pertinent Labs:  @ 00:31: Na 140, BUN 26<H>, Cr 1.41<H>, <H>, K+ 3.7, Phos 3.1, Mg 2.3, Alk Phos 44, ALT/SGPT 33, AST/SGOT 72<H>    A1C with Estimated Average Glucose Result: 8.3 % (23 @ 06:14)    Finger Sticks:  POCT Blood Glucose.: 168 mg/dL ( @ 09:10)  POCT Blood Glucose.: 160 mg/dL ( @ 08:06)  POCT Blood Glucose.: 157 mg/dL ( @ 06:45)  POCT Blood Glucose.: 170 mg/dL ( @ 05:47)  POCT Blood Glucose.: 179 mg/dL ( @ 05:05)  POCT Blood Glucose.: 160 mg/dL ( @ 03:52)  POCT Blood Glucose.: 97 mg/dL ( @ 02:46)  POCT Blood Glucose.: 104 mg/dL ( @ 01:57)  POCT Blood Glucose.: 114 mg/dL ( @ 00:53)  POCT Blood Glucose.: 122 mg/dL ( @ 23:56)  POCT Blood Glucose.: 131 mg/dL ( @ 22:56)  POCT Blood Glucose.: 150 mg/dL ( @ 21:58)  POCT Blood Glucose.: 147 mg/dL ( @ 20:49)  POCT Blood Glucose.: 146 mg/dL ( @ 19:52)  POCT Blood Glucose.: 159 mg/dL ( @ 18:45)  POCT Blood Glucose.: 163 mg/dL ( @ 18:11)  POCT Blood Glucose.: 181 mg/dL ( @ 16:51)  POCT Blood Glucose.: 188 mg/dL ( @ 15:47)  POCT Blood Glucose.: 208 mg/dL ( @ 14:49)  POCT Blood Glucose.: 199 mg/dL ( @ 13:54)  POCT Blood Glucose.: 215 mg/dL ( @ 12:53)  POCT Blood Glucose.: 218 mg/dL ( @ 11:58)  POCT Blood Glucose.: 215 mg/dL ( @ 10:55)  POCT Blood Glucose.: 226 mg/dL ( @ 09:57)    Skin per nursing documentation: No pressure injuries noted.  Edema per nursing documentation: 1+ generalized     Estimated Needs:   Based on low on daily wt of 87.4 kG (-03) with consideration for increased nutrient needs:  Estimated Caloric Needs: (26-31 kcal/kg): 7042-9675 kcal  Estimated Protein Needs: (1.3-1.6 g/kg): 114-140 gm  Defer fluid needs to team.    Previous Nutrition Diagnosis: Increased nutrient needs  Nutrition Diagnosis is: [x] ongoing - being addressed with adequate oral intake    Previous Nutrition Diagnosis: Inadequate energy intake  Nutrition Diagnosis is: [x] ongoing; pt appetite has improved    Previous Nutrition Diagnosis: Food & Nutrition-Related Knowledge Deficit   Nutrition Diagnosis is: [x] ongoing - being addressed with ongoing diet education PRN    New Nutrition Diagnosis: [x] N/A    Nutrition Care Plan:  [x] In Progress  [] Achieved  [] Not applicable    Nutrition Interventions:     Education Provided:       [x] Yes: 1. Food safety education re-discussed as pt listed for heart transplant with emphasis on cross contamination, importance of blood glucose control while on prednisone, drinking filtered water only, no ice during admission, property food temperatures. Pt was receptive to information and was able to use teach back points to verbalize understanding.     Recommendations:  1. When diet advanced, consider consistent carbohydrate.   -> Fluid needs deferred to provider. Consistency deferred to SLP and provider.   2. RD remains available to provide ongoing nutrition education PRN   3. Honor pt food preferences to promote adequate oral intake while in-house     Monitoring and Evaluation:   Continue to monitor nutritional intake, tolerance to diet prescription, weights, labs, skin integrity    RD remains available upon request and will follow up per protocol  Carolyne Clifford RD, MS, CDN, CNSC, [facility] TEAMS

## 2023-12-26 NOTE — CONSULT NOTE ADULT - PROBLEM SELECTOR PROBLEM 2
CAD (coronary artery disease)
CAD (coronary artery disease)
Steroid-induced hyperglycemia
Cardiac arrest

## 2023-12-26 NOTE — PROGRESS NOTE ADULT - PROBLEM SELECTOR PLAN 3
- CMV -/+: intermediate risk.  Will need to start on valganciclovir when able X 6 months.    - Toxo -/-: none required  - PJP ppx: will introduce eventually   - Trush ppx: eventual Nystatin.  - donor HCV TIM - but HCV antibody +.  - on daptomycin ppx for hx of rash to vancomycin and donor sputum + for staph. - CMV -/+: intermediate risk.  Plan to start valganciclovir when able X 6 months.    - Toxo -/-: none required  - PJP ppx: plan to start bactrim or mepron based off renal function   - Trush ppx: eventual Nystatin.  - donor HCV TIM - but HCV antibody +.  - on daptomycin ppx for hx of rash to vancomycin and donor sputum + for staph.

## 2023-12-26 NOTE — PROGRESS NOTE ADULT - SUBJECTIVE AND OBJECTIVE BOX
INFECTIOUS DISEASES FOLLOW UP-    This is a follow up note for this  58yMale with  Non-ST elevation myocardial infarction (NSTEMI)    s/p OHTx on 12/25  off epi, on high flow       ROS:  CONSTITUTIONAL:  No fever, remains sedated, intubated  CARDIOVASCULAR:  No chest pain or palpitations  RESPIRATORY:  No dyspnea  GASTROINTESTINAL:  No nausea, vomiting, diarrhea, or abdominal pain  GENITOURINARY:  No dysuria  NEUROLOGIC:  No headache,     Allergies    penicillins (Unknown)    Intolerances            ICU Vital Signs Last 24 Hrs  T(C): 37.1 (26 Dec 2023 20:00), Max: 37.1 (26 Dec 2023 20:00)  T(F): 98.8 (26 Dec 2023 20:00), Max: 98.8 (26 Dec 2023 20:00)  HR: 91 (26 Dec 2023 20:00) (88 - 91)  BP: --  BP(mean): --  ABP: 106/54 (26 Dec 2023 20:00) (88/46 - 146/68)  ABP(mean): 71 (26 Dec 2023 20:00) (59 - 108)  RR: 14 (26 Dec 2023 20:00) (10 - 46)  SpO2: 95% (26 Dec 2023 20:00) (95% - 100%)    O2 Parameters below as of 26 Dec 2023 21:20  Patient On (Oxygen Delivery Method): nasal cannula, high flow  O2 Flow (L/min): 40  O2 Concentration (%): 40        PHYSICAL EXAM:  Constitutional:no acute distress, sedated intubated  Eyes:MARVEL, EOMI  Ear/Nose/Throat: no oral lesions, right CVC/martin	  Respiratory: clear BL  chest tubes x4  Cardiovascular: S1S2 sternotomy dressing CDI  Gastrointestinal:soft, (+) BS, no tenderness  Extremities:no e/e/c  IABP femoral line remains in place  No Lymphadenopathy  IV sites not inflammed.        ____________________________________________________  ROS  GENERAL: denies chills, , night sweats, weight loss.   PSYCH: denies depression, anxiety, suicidal ideation, hallucination, and delusions  SKIN: no rash or lesions; no color changes, no abnormal nevi,no  dryness, and nojaundice    EYES: denies visual changes, floaters, pain, inflammation, blurred vision, and discharge  ENT: denies tinnitus, vertigo, epistaxis, oral lesion, and decreased acuity  PULM: denies, hemoptysis, pleurisy  CVS: denies angina, palpitations,+ orthopnea, no syncope, or heart murmur  GI: denies constipation, diarrhea, melena, abdominal pain, nausea.   : denies dysuria, frequency, discharge, incontinence, stones or macroscopic hematuria  MS: no arthralgias, no erythema or swelling, no myalgias, noedema, or lower back pain.   CNS: denies numbness, dizziness, seizure, or tremor  ENDO: denies heat/cold intolerance, polyuria, polydipsia, malaise.    HEME: denies bruising, bleeding, lymphadenopathy, anemia, and calf pain    Allergies  penicillins (Unknown)    __________________________________________________  MEDS:  MEDICATIONS  (STANDING):  dexMEDEtomidine Infusion 0.5 <Continuous>  dextrose 50% Injectable 50 every 15 minutes  gabapentin 100 every 8 hours  heparin   Injectable 5000 every 8 hours  insulin regular Infusion 3 <Continuous>  methocarbamol 500 every 8 hours  methylPREDNISolone sodium succinate Injectable    methylPREDNISolone sodium succinate Injectable 40 every 12 hours  metoclopramide Injectable 10 every 8 hours  milrinone Infusion 0.25 <Continuous>  mycophenolate mofetil IVPB 1000 <User Schedule>  naloxegol 25 daily  niCARdipine Infusion 3 <Continuous>  pantoprazole  Injectable 40 daily  polyethylene glycol 3350 17 daily  senna 2 at bedtime  simethicone 160 every 8 hours  tacrolimus 0.5 <User Schedule>  tiotropium 2.5 MICROgram(s) Inhaler 2 daily  traMADol 25 every 8 hours    _________________________________________________  ANTIMICOBIALS  cefepime   IVPB 1000 every 8 hours  DAPTOmycin IVPB 500 <User Schedule>  mycophenolate mofetil IVPB 1000 <User Schedule>  nystatin    Suspension [id number] <User Schedule>  tacrolimus 0.5 <User Schedule>  vancomycin    Solution 125 every 12 hours      GENERAL LABS              8.8                  x    | x    | x            11.11 >-----------< 198     ------------------------< x                     26.6                 x    | x    | x                                            Ca x     Mg x     Ph x        Vancomycin Level, Random: <4.0 (12-08 @ 01:22)    Urinalysis Basic - ( 26 Dec 2023 00:31 )    Color: x / Appearance: x / SG: x / pH: x  Gluc: 117 mg/dL / Ketone: x  / Bili: x / Urobili: x   Blood: x / Protein: x / Nitrite: x   Leuk Esterase: x / RBC: x / WBC x   Sq Epi: x / Non Sq Epi: x / Bacteria: x        _________________________________________________  MICROBIOLOGY  -----------    Culture - Body Fluid with Gram Stain (collected 25 Dec 2023 15:28)  Source: .Body Fluid procurement fluid  Gram Stain (25 Dec 2023 21:08):    No polymorphonuclear cells seen    No organisms seen    by cytocentrifuge  Preliminary Report (26 Dec 2023 20:02):    No growth            Rapid RVP Result: NotDetec (12-24 @ 15:09)           INFECTIOUS DISEASES FOLLOW UP-    This is a follow up note for this  58yMale with  Non-ST elevation myocardial infarction (NSTEMI)    s/p OHTx on 12/25  off epi, on high flow       ROS:  CONSTITUTIONAL:  No fever, remains sedated, intubated  CARDIOVASCULAR:  No chest pain or palpitations  RESPIRATORY:  No dyspnea  GASTROINTESTINAL:  No nausea, vomiting, diarrhea, or abdominal pain  GENITOURINARY:  No dysuria  NEUROLOGIC:  No headache,     Allergies    penicillins (Unknown)    Intolerances            ICU Vital Signs Last 24 Hrs  T(C): 37.1 (26 Dec 2023 20:00), Max: 37.1 (26 Dec 2023 20:00)  T(F): 98.8 (26 Dec 2023 20:00), Max: 98.8 (26 Dec 2023 20:00)  HR: 91 (26 Dec 2023 20:00) (88 - 91)  BP: --  BP(mean): --  ABP: 106/54 (26 Dec 2023 20:00) (88/46 - 146/68)  ABP(mean): 71 (26 Dec 2023 20:00) (59 - 108)  RR: 14 (26 Dec 2023 20:00) (10 - 46)  SpO2: 95% (26 Dec 2023 20:00) (95% - 100%)    O2 Parameters below as of 26 Dec 2023 21:20  Patient On (Oxygen Delivery Method): nasal cannula, high flow  O2 Flow (L/min): 40  O2 Concentration (%): 40        PHYSICAL EXAM:  Constitutional:no acute distress, sedated intubated  Eyes:MARVEL, EOMI  Ear/Nose/Throat: no oral lesions, right CVC/martin	  Respiratory: clear BL  chest tubes x4  Cardiovascular: S1S2 sternotomy dressing CDI  Gastrointestinal:soft, (+) BS, no tenderness  Extremities:no e/e/c  IABP femoral line remains in place  No Lymphadenopathy  IV sites not inflammed.        ____________________________________________________  ROS  GENERAL: denies chills, , night sweats, weight loss.   PSYCH: denies depression, anxiety, suicidal ideation, hallucination, and delusions  SKIN: no rash or lesions; no color changes, no abnormal nevi,no  dryness, and nojaundice    EYES: denies visual changes, floaters, pain, inflammation, blurred vision, and discharge  ENT: denies tinnitus, vertigo, epistaxis, oral lesion, and decreased acuity  PULM: denies, hemoptysis, pleurisy  CVS: denies angina, palpitations,+ orthopnea, no syncope, or heart murmur  GI: denies constipation, diarrhea, melena, abdominal pain, nausea.   : denies dysuria, frequency, discharge, incontinence, stones or macroscopic hematuria  MS: no arthralgias, no erythema or swelling, no myalgias, noedema, or lower back pain.   CNS: denies numbness, dizziness, seizure, or tremor  ENDO: denies heat/cold intolerance, polyuria, polydipsia, malaise.    HEME: denies bruising, bleeding, lymphadenopathy, anemia, and calf pain    Allergies  penicillins (Unknown)    __________________________________________________  MEDS:  MEDICATIONS  (STANDING):  dexMEDEtomidine Infusion 0.5 <Continuous>  dextrose 50% Injectable 50 every 15 minutes  gabapentin 100 every 8 hours  heparin   Injectable 5000 every 8 hours  insulin regular Infusion 3 <Continuous>  methocarbamol 500 every 8 hours  methylPREDNISolone sodium succinate Injectable    methylPREDNISolone sodium succinate Injectable 40 every 12 hours  metoclopramide Injectable 10 every 8 hours  milrinone Infusion 0.25 <Continuous>  mycophenolate mofetil IVPB 1000 <User Schedule>  naloxegol 25 daily  niCARdipine Infusion 3 <Continuous>  pantoprazole  Injectable 40 daily  polyethylene glycol 3350 17 daily  senna 2 at bedtime  simethicone 160 every 8 hours  tacrolimus 0.5 <User Schedule>  tiotropium 2.5 MICROgram(s) Inhaler 2 daily  traMADol 25 every 8 hours    _________________________________________________  ANTIMICOBIALS  cefepime   IVPB 1000 every 8 hours  DAPTOmycin IVPB 500 <User Schedule>  mycophenolate mofetil IVPB 1000 <User Schedule>  nystatin    Suspension 035012 <User Schedule>  tacrolimus 0.5 <User Schedule>  vancomycin    Solution 125 every 12 hours      GENERAL LABS              8.8                  x    | x    | x            11.11 >-----------< 198     ------------------------< x                     26.6                 x    | x    | x                                            Ca x     Mg x     Ph x        Vancomycin Level, Random: <4.0 (12-08 @ 01:22)    Urinalysis Basic - ( 26 Dec 2023 00:31 )    Color: x / Appearance: x / SG: x / pH: x  Gluc: 117 mg/dL / Ketone: x  / Bili: x / Urobili: x   Blood: x / Protein: x / Nitrite: x   Leuk Esterase: x / RBC: x / WBC x   Sq Epi: x / Non Sq Epi: x / Bacteria: x        _________________________________________________  MICROBIOLOGY  -----------    Culture - Body Fluid with Gram Stain (collected 25 Dec 2023 15:28)  Source: .Body Fluid procurement fluid  Gram Stain (25 Dec 2023 21:08):    No polymorphonuclear cells seen    No organisms seen    by cytocentrifuge  Preliminary Report (26 Dec 2023 20:02):    No growth            Rapid RVP Result: NotDetec (12-24 @ 15:09)

## 2023-12-26 NOTE — PROGRESS NOTE ADULT - PROVIDER SPECIALTY LIST ADULT
"  Subjective:      32 y.o. female presents to urgent care for migraine that started yesterday around 11 AM.  There was associated aura.  The pain is located on the left side of her head and is constant, is described as pounding, currently rated 10/10.  She has tried Advil, Tylenol, and Benadryl with only some relief in symptoms.  She has associated noise and light sensitivity.  She also has nausea without any vomiting. Heart rate is elevated today in urgent care.  She denies any chest pain, palpitations, or shortness of breath.    Headache red flags  -Recent head trauma: no  -History of exposure to CO, alcohol, drugs: no  -Headaches that awake patient from sleep: no  -Associated change in vision: no  -On anticoagulant therapy: no  -Lack of coordination: no  -Associated neurologic abnormalities (altered mental status, seizure): no  -New onset of headache over the age of 50 years old: no  -Rapid onset of headache with strenuous exercise: no  -Rapid increase in headache frequency: no  -Immunocompromised (Current diagnosis of cancer, HIV): no      She denies any other questions or concerns at this time.    Current problem list, medication, and past medical/surgical history were reviewed in Epic.    ROS  See HPI     Objective:      /76   Pulse (!) 101   Temp 37.1 °C (98.7 °F) (Temporal)   Resp 16   Ht 1.626 m (5' 4\")   Wt 54.4 kg (120 lb)   SpO2 98%   BMI 20.60 kg/m²     Physical Exam  Constitutional:       General: She is not in acute distress.     Appearance: She is not diaphoretic.   Cardiovascular:      Rate and Rhythm: Regular rhythm. Tachycardia present.      Heart sounds: Normal heart sounds.   Pulmonary:      Effort: Pulmonary effort is normal. No respiratory distress.      Breath sounds: Normal breath sounds.   Neurological:      Mental Status: She is alert and oriented to person, place, and time. Mental status is at baseline.      Comments: Cranial nerves II through XII grossly intact.  Equal " strength and sensation to extremities x 4.   Psychiatric:         Mood and Affect: Affect normal.         Judgment: Judgment normal.       Assessment/Plan:     1. Migraine with aura and without status migrainosus, not intractable  2. Tachycardia  Systemic symptoms seen through tachycardia.  This is asymptomatic.  Patient was given Toradol and sumatriptan in clinic with good relief in symptoms.  She was advised to avoid NSAIDs for the next 24 hours.  - ketorolac (Toradol) injection 30 mg  - SUMAtriptan Succinate (Imitrex) injection 6 mg          Instructed to return to Urgent Care or nearest Emergency Department if symptoms fail to improve, for any change in condition, further concerns, or new concerning symptoms. Patient states understanding of the plan of care and discharge instructions.    Kimmie Bailey M.D.    [facility]

## 2023-12-26 NOTE — AIRWAY REMOVAL NOTE  ADULT & PEDS - ARTIFICAL AIRWAY REMOVAL COMMENTS
Pt extubated by RT Kizzy Chaney.
Written order for extubation verified. The patient was identified by full name and birth date compared to the identification band. Present during the procedure was Lissa
Written order for extubation verified. The patient was identified by full name and birth date compared to the identification band. Present during the procedure was Miesha Durham RN.

## 2023-12-27 LAB
ALBUMIN SERPL ELPH-MCNC: 3.8 G/DL — SIGNIFICANT CHANGE UP (ref 3.3–5)
ALBUMIN SERPL ELPH-MCNC: 3.8 G/DL — SIGNIFICANT CHANGE UP (ref 3.3–5)
ALP SERPL-CCNC: 42 U/L — SIGNIFICANT CHANGE UP (ref 40–120)
ALP SERPL-CCNC: 42 U/L — SIGNIFICANT CHANGE UP (ref 40–120)
ALT FLD-CCNC: 34 U/L — SIGNIFICANT CHANGE UP (ref 10–45)
ALT FLD-CCNC: 34 U/L — SIGNIFICANT CHANGE UP (ref 10–45)
ANION GAP SERPL CALC-SCNC: 13 MMOL/L — SIGNIFICANT CHANGE UP (ref 5–17)
ANION GAP SERPL CALC-SCNC: 13 MMOL/L — SIGNIFICANT CHANGE UP (ref 5–17)
APTT BLD: 26.9 SEC — SIGNIFICANT CHANGE UP (ref 24.5–35.6)
APTT BLD: 26.9 SEC — SIGNIFICANT CHANGE UP (ref 24.5–35.6)
AST SERPL-CCNC: 48 U/L — HIGH (ref 10–40)
AST SERPL-CCNC: 48 U/L — HIGH (ref 10–40)
BASE EXCESS BLDMV CALC-SCNC: -2.2 MMOL/L — SIGNIFICANT CHANGE UP (ref -3–3)
BASE EXCESS BLDMV CALC-SCNC: -2.2 MMOL/L — SIGNIFICANT CHANGE UP (ref -3–3)
BASE EXCESS BLDMV CALC-SCNC: -2.8 MMOL/L — SIGNIFICANT CHANGE UP (ref -3–3)
BASE EXCESS BLDMV CALC-SCNC: -2.8 MMOL/L — SIGNIFICANT CHANGE UP (ref -3–3)
BASE EXCESS BLDMV CALC-SCNC: -3.3 MMOL/L — LOW (ref -3–3)
BASE EXCESS BLDMV CALC-SCNC: -3.3 MMOL/L — LOW (ref -3–3)
BASE EXCESS BLDV CALC-SCNC: -1.5 MMOL/L — SIGNIFICANT CHANGE UP (ref -2–3)
BASE EXCESS BLDV CALC-SCNC: -1.5 MMOL/L — SIGNIFICANT CHANGE UP (ref -2–3)
BASOPHILS # BLD AUTO: 0 K/UL — SIGNIFICANT CHANGE UP (ref 0–0.2)
BASOPHILS # BLD AUTO: 0 K/UL — SIGNIFICANT CHANGE UP (ref 0–0.2)
BASOPHILS NFR BLD AUTO: 0 % — SIGNIFICANT CHANGE UP (ref 0–2)
BASOPHILS NFR BLD AUTO: 0 % — SIGNIFICANT CHANGE UP (ref 0–2)
BILIRUB SERPL-MCNC: 0.4 MG/DL — SIGNIFICANT CHANGE UP (ref 0.2–1.2)
BILIRUB SERPL-MCNC: 0.4 MG/DL — SIGNIFICANT CHANGE UP (ref 0.2–1.2)
BUN SERPL-MCNC: 33 MG/DL — HIGH (ref 7–23)
BUN SERPL-MCNC: 33 MG/DL — HIGH (ref 7–23)
CALCIUM SERPL-MCNC: 9.3 MG/DL — SIGNIFICANT CHANGE UP (ref 8.4–10.5)
CALCIUM SERPL-MCNC: 9.3 MG/DL — SIGNIFICANT CHANGE UP (ref 8.4–10.5)
CHLORIDE SERPL-SCNC: 102 MMOL/L — SIGNIFICANT CHANGE UP (ref 96–108)
CHLORIDE SERPL-SCNC: 102 MMOL/L — SIGNIFICANT CHANGE UP (ref 96–108)
CO2 BLDMV-SCNC: 23 MMOL/L — SIGNIFICANT CHANGE UP (ref 21–29)
CO2 BLDMV-SCNC: 23 MMOL/L — SIGNIFICANT CHANGE UP (ref 21–29)
CO2 BLDMV-SCNC: 25 MMOL/L — SIGNIFICANT CHANGE UP (ref 21–29)
CO2 BLDV-SCNC: 25 MMOL/L — SIGNIFICANT CHANGE UP (ref 22–26)
CO2 BLDV-SCNC: 25 MMOL/L — SIGNIFICANT CHANGE UP (ref 22–26)
CO2 SERPL-SCNC: 22 MMOL/L — SIGNIFICANT CHANGE UP (ref 22–31)
CO2 SERPL-SCNC: 22 MMOL/L — SIGNIFICANT CHANGE UP (ref 22–31)
CREAT SERPL-MCNC: 1.34 MG/DL — HIGH (ref 0.5–1.3)
CREAT SERPL-MCNC: 1.34 MG/DL — HIGH (ref 0.5–1.3)
EGFR: 61 ML/MIN/1.73M2 — SIGNIFICANT CHANGE UP
EGFR: 61 ML/MIN/1.73M2 — SIGNIFICANT CHANGE UP
EOSINOPHIL # BLD AUTO: 0 K/UL — SIGNIFICANT CHANGE UP (ref 0–0.5)
EOSINOPHIL # BLD AUTO: 0 K/UL — SIGNIFICANT CHANGE UP (ref 0–0.5)
EOSINOPHIL NFR BLD AUTO: 0 % — SIGNIFICANT CHANGE UP (ref 0–6)
EOSINOPHIL NFR BLD AUTO: 0 % — SIGNIFICANT CHANGE UP (ref 0–6)
FIBRINOGEN PPP-MCNC: 365 MG/DL — SIGNIFICANT CHANGE UP (ref 200–445)
FIBRINOGEN PPP-MCNC: 365 MG/DL — SIGNIFICANT CHANGE UP (ref 200–445)
GAS PNL BLDA: SIGNIFICANT CHANGE UP
GAS PNL BLDMV: SIGNIFICANT CHANGE UP
GLUCOSE BLDC GLUCOMTR-MCNC: 109 MG/DL — HIGH (ref 70–99)
GLUCOSE BLDC GLUCOMTR-MCNC: 109 MG/DL — HIGH (ref 70–99)
GLUCOSE BLDC GLUCOMTR-MCNC: 122 MG/DL — HIGH (ref 70–99)
GLUCOSE BLDC GLUCOMTR-MCNC: 122 MG/DL — HIGH (ref 70–99)
GLUCOSE BLDC GLUCOMTR-MCNC: 131 MG/DL — HIGH (ref 70–99)
GLUCOSE BLDC GLUCOMTR-MCNC: 131 MG/DL — HIGH (ref 70–99)
GLUCOSE BLDC GLUCOMTR-MCNC: 134 MG/DL — HIGH (ref 70–99)
GLUCOSE BLDC GLUCOMTR-MCNC: 134 MG/DL — HIGH (ref 70–99)
GLUCOSE BLDC GLUCOMTR-MCNC: 137 MG/DL — HIGH (ref 70–99)
GLUCOSE BLDC GLUCOMTR-MCNC: 137 MG/DL — HIGH (ref 70–99)
GLUCOSE BLDC GLUCOMTR-MCNC: 139 MG/DL — HIGH (ref 70–99)
GLUCOSE BLDC GLUCOMTR-MCNC: 139 MG/DL — HIGH (ref 70–99)
GLUCOSE BLDC GLUCOMTR-MCNC: 143 MG/DL — HIGH (ref 70–99)
GLUCOSE BLDC GLUCOMTR-MCNC: 143 MG/DL — HIGH (ref 70–99)
GLUCOSE BLDC GLUCOMTR-MCNC: 146 MG/DL — HIGH (ref 70–99)
GLUCOSE BLDC GLUCOMTR-MCNC: 146 MG/DL — HIGH (ref 70–99)
GLUCOSE BLDC GLUCOMTR-MCNC: 159 MG/DL — HIGH (ref 70–99)
GLUCOSE BLDC GLUCOMTR-MCNC: 159 MG/DL — HIGH (ref 70–99)
GLUCOSE BLDC GLUCOMTR-MCNC: 160 MG/DL — HIGH (ref 70–99)
GLUCOSE BLDC GLUCOMTR-MCNC: 160 MG/DL — HIGH (ref 70–99)
GLUCOSE BLDC GLUCOMTR-MCNC: 178 MG/DL — HIGH (ref 70–99)
GLUCOSE BLDC GLUCOMTR-MCNC: 178 MG/DL — HIGH (ref 70–99)
GLUCOSE BLDC GLUCOMTR-MCNC: 179 MG/DL — HIGH (ref 70–99)
GLUCOSE BLDC GLUCOMTR-MCNC: 179 MG/DL — HIGH (ref 70–99)
GLUCOSE BLDC GLUCOMTR-MCNC: 187 MG/DL — HIGH (ref 70–99)
GLUCOSE BLDC GLUCOMTR-MCNC: 187 MG/DL — HIGH (ref 70–99)
GLUCOSE BLDC GLUCOMTR-MCNC: 194 MG/DL — HIGH (ref 70–99)
GLUCOSE BLDC GLUCOMTR-MCNC: 194 MG/DL — HIGH (ref 70–99)
GLUCOSE BLDC GLUCOMTR-MCNC: 197 MG/DL — HIGH (ref 70–99)
GLUCOSE BLDC GLUCOMTR-MCNC: 197 MG/DL — HIGH (ref 70–99)
GLUCOSE BLDC GLUCOMTR-MCNC: 210 MG/DL — HIGH (ref 70–99)
GLUCOSE BLDC GLUCOMTR-MCNC: 210 MG/DL — HIGH (ref 70–99)
GLUCOSE BLDC GLUCOMTR-MCNC: 213 MG/DL — HIGH (ref 70–99)
GLUCOSE BLDC GLUCOMTR-MCNC: 213 MG/DL — HIGH (ref 70–99)
GLUCOSE BLDC GLUCOMTR-MCNC: 218 MG/DL — HIGH (ref 70–99)
GLUCOSE BLDC GLUCOMTR-MCNC: 218 MG/DL — HIGH (ref 70–99)
GLUCOSE BLDC GLUCOMTR-MCNC: 224 MG/DL — HIGH (ref 70–99)
GLUCOSE BLDC GLUCOMTR-MCNC: 224 MG/DL — HIGH (ref 70–99)
GLUCOSE BLDC GLUCOMTR-MCNC: 231 MG/DL — HIGH (ref 70–99)
GLUCOSE BLDC GLUCOMTR-MCNC: 231 MG/DL — HIGH (ref 70–99)
GLUCOSE BLDC GLUCOMTR-MCNC: 79 MG/DL — SIGNIFICANT CHANGE UP (ref 70–99)
GLUCOSE BLDC GLUCOMTR-MCNC: 79 MG/DL — SIGNIFICANT CHANGE UP (ref 70–99)
GLUCOSE SERPL-MCNC: 205 MG/DL — HIGH (ref 70–99)
GLUCOSE SERPL-MCNC: 205 MG/DL — HIGH (ref 70–99)
HCO3 BLDMV-SCNC: 22 MMOL/L — SIGNIFICANT CHANGE UP (ref 20–28)
HCO3 BLDMV-SCNC: 22 MMOL/L — SIGNIFICANT CHANGE UP (ref 20–28)
HCO3 BLDMV-SCNC: 23 MMOL/L — SIGNIFICANT CHANGE UP (ref 20–28)
HCO3 BLDMV-SCNC: 23 MMOL/L — SIGNIFICANT CHANGE UP (ref 20–28)
HCO3 BLDMV-SCNC: 24 MMOL/L — SIGNIFICANT CHANGE UP (ref 20–28)
HCO3 BLDMV-SCNC: 24 MMOL/L — SIGNIFICANT CHANGE UP (ref 20–28)
HCO3 BLDV-SCNC: 24 MMOL/L — SIGNIFICANT CHANGE UP (ref 22–29)
HCO3 BLDV-SCNC: 24 MMOL/L — SIGNIFICANT CHANGE UP (ref 22–29)
HCT VFR BLD CALC: 25 % — LOW (ref 39–50)
HCT VFR BLD CALC: 25 % — LOW (ref 39–50)
HGB BLD-MCNC: 8 G/DL — LOW (ref 13–17)
HGB BLD-MCNC: 8 G/DL — LOW (ref 13–17)
HOROWITZ INDEX BLDMV+IHG-RTO: 40 — SIGNIFICANT CHANGE UP
HOROWITZ INDEX BLDV+IHG-RTO: 30 — SIGNIFICANT CHANGE UP
HOROWITZ INDEX BLDV+IHG-RTO: 30 — SIGNIFICANT CHANGE UP
IMM GRANULOCYTES NFR BLD AUTO: 0.8 % — SIGNIFICANT CHANGE UP (ref 0–0.9)
IMM GRANULOCYTES NFR BLD AUTO: 0.8 % — SIGNIFICANT CHANGE UP (ref 0–0.9)
INR BLD: 1.28 RATIO — HIGH (ref 0.85–1.18)
INR BLD: 1.28 RATIO — HIGH (ref 0.85–1.18)
LYMPHOCYTES # BLD AUTO: 0.65 K/UL — LOW (ref 1–3.3)
LYMPHOCYTES # BLD AUTO: 0.65 K/UL — LOW (ref 1–3.3)
LYMPHOCYTES # BLD AUTO: 6.6 % — LOW (ref 13–44)
LYMPHOCYTES # BLD AUTO: 6.6 % — LOW (ref 13–44)
MAGNESIUM SERPL-MCNC: 2.1 MG/DL — SIGNIFICANT CHANGE UP (ref 1.6–2.6)
MAGNESIUM SERPL-MCNC: 2.1 MG/DL — SIGNIFICANT CHANGE UP (ref 1.6–2.6)
MCHC RBC-ENTMCNC: 29.5 PG — SIGNIFICANT CHANGE UP (ref 27–34)
MCHC RBC-ENTMCNC: 29.5 PG — SIGNIFICANT CHANGE UP (ref 27–34)
MCHC RBC-ENTMCNC: 32 GM/DL — SIGNIFICANT CHANGE UP (ref 32–36)
MCHC RBC-ENTMCNC: 32 GM/DL — SIGNIFICANT CHANGE UP (ref 32–36)
MCV RBC AUTO: 92.3 FL — SIGNIFICANT CHANGE UP (ref 80–100)
MCV RBC AUTO: 92.3 FL — SIGNIFICANT CHANGE UP (ref 80–100)
MONOCYTES # BLD AUTO: 0.53 K/UL — SIGNIFICANT CHANGE UP (ref 0–0.9)
MONOCYTES # BLD AUTO: 0.53 K/UL — SIGNIFICANT CHANGE UP (ref 0–0.9)
MONOCYTES NFR BLD AUTO: 5.4 % — SIGNIFICANT CHANGE UP (ref 2–14)
MONOCYTES NFR BLD AUTO: 5.4 % — SIGNIFICANT CHANGE UP (ref 2–14)
NEUTROPHILS # BLD AUTO: 8.64 K/UL — HIGH (ref 1.8–7.4)
NEUTROPHILS # BLD AUTO: 8.64 K/UL — HIGH (ref 1.8–7.4)
NEUTROPHILS NFR BLD AUTO: 87.2 % — HIGH (ref 43–77)
NEUTROPHILS NFR BLD AUTO: 87.2 % — HIGH (ref 43–77)
NRBC # BLD: 0 /100 WBCS — SIGNIFICANT CHANGE UP (ref 0–0)
NRBC # BLD: 0 /100 WBCS — SIGNIFICANT CHANGE UP (ref 0–0)
O2 CT VFR BLD CALC: 40 MMHG — SIGNIFICANT CHANGE UP (ref 30–65)
O2 CT VFR BLD CALC: 40 MMHG — SIGNIFICANT CHANGE UP (ref 30–65)
O2 CT VFR BLD CALC: 44 MMHG — SIGNIFICANT CHANGE UP (ref 30–65)
O2 CT VFR BLD CALC: 44 MMHG — SIGNIFICANT CHANGE UP (ref 30–65)
O2 CT VFR BLD CALC: 45 MMHG — SIGNIFICANT CHANGE UP (ref 30–65)
O2 CT VFR BLD CALC: 45 MMHG — SIGNIFICANT CHANGE UP (ref 30–65)
PCO2 BLDMV: 40 MMHG — SIGNIFICANT CHANGE UP (ref 30–65)
PCO2 BLDMV: 40 MMHG — SIGNIFICANT CHANGE UP (ref 30–65)
PCO2 BLDMV: 44 MMHG — SIGNIFICANT CHANGE UP (ref 30–65)
PCO2 BLDV: 41 MMHG — LOW (ref 42–55)
PCO2 BLDV: 41 MMHG — LOW (ref 42–55)
PH BLDMV: 7.33 — SIGNIFICANT CHANGE UP (ref 7.32–7.45)
PH BLDMV: 7.33 — SIGNIFICANT CHANGE UP (ref 7.32–7.45)
PH BLDMV: 7.34 — SIGNIFICANT CHANGE UP (ref 7.32–7.45)
PH BLDMV: 7.34 — SIGNIFICANT CHANGE UP (ref 7.32–7.45)
PH BLDMV: 7.35 — SIGNIFICANT CHANGE UP (ref 7.32–7.45)
PH BLDMV: 7.35 — SIGNIFICANT CHANGE UP (ref 7.32–7.45)
PH BLDV: 7.37 — SIGNIFICANT CHANGE UP (ref 7.32–7.43)
PH BLDV: 7.37 — SIGNIFICANT CHANGE UP (ref 7.32–7.43)
PHOSPHATE SERPL-MCNC: 4.9 MG/DL — HIGH (ref 2.5–4.5)
PHOSPHATE SERPL-MCNC: 4.9 MG/DL — HIGH (ref 2.5–4.5)
PLATELET # BLD AUTO: 133 K/UL — LOW (ref 150–400)
PLATELET # BLD AUTO: 133 K/UL — LOW (ref 150–400)
PO2 BLDV: 42 MMHG — SIGNIFICANT CHANGE UP (ref 25–45)
PO2 BLDV: 42 MMHG — SIGNIFICANT CHANGE UP (ref 25–45)
POTASSIUM SERPL-MCNC: 3.9 MMOL/L — SIGNIFICANT CHANGE UP (ref 3.5–5.3)
POTASSIUM SERPL-MCNC: 3.9 MMOL/L — SIGNIFICANT CHANGE UP (ref 3.5–5.3)
POTASSIUM SERPL-SCNC: 3.9 MMOL/L — SIGNIFICANT CHANGE UP (ref 3.5–5.3)
POTASSIUM SERPL-SCNC: 3.9 MMOL/L — SIGNIFICANT CHANGE UP (ref 3.5–5.3)
PROT SERPL-MCNC: 5.9 G/DL — LOW (ref 6–8.3)
PROT SERPL-MCNC: 5.9 G/DL — LOW (ref 6–8.3)
PROTHROM AB SERPL-ACNC: 13.3 SEC — HIGH (ref 9.5–13)
PROTHROM AB SERPL-ACNC: 13.3 SEC — HIGH (ref 9.5–13)
RBC # BLD: 2.71 M/UL — LOW (ref 4.2–5.8)
RBC # BLD: 2.71 M/UL — LOW (ref 4.2–5.8)
RBC # FLD: 15.4 % — HIGH (ref 10.3–14.5)
RBC # FLD: 15.4 % — HIGH (ref 10.3–14.5)
SAO2 % BLDMV: 67.7 — SIGNIFICANT CHANGE UP (ref 60–90)
SAO2 % BLDMV: 67.7 — SIGNIFICANT CHANGE UP (ref 60–90)
SAO2 % BLDMV: 69.6 — SIGNIFICANT CHANGE UP (ref 60–90)
SAO2 % BLDMV: 69.6 — SIGNIFICANT CHANGE UP (ref 60–90)
SAO2 % BLDMV: 74.9 — SIGNIFICANT CHANGE UP (ref 60–90)
SAO2 % BLDMV: 74.9 — SIGNIFICANT CHANGE UP (ref 60–90)
SAO2 % BLDV: 70.5 % — SIGNIFICANT CHANGE UP (ref 67–88)
SAO2 % BLDV: 70.5 % — SIGNIFICANT CHANGE UP (ref 67–88)
SODIUM SERPL-SCNC: 137 MMOL/L — SIGNIFICANT CHANGE UP (ref 135–145)
SODIUM SERPL-SCNC: 137 MMOL/L — SIGNIFICANT CHANGE UP (ref 135–145)
TACROLIMUS SERPL-MCNC: <0.8 NG/ML — SIGNIFICANT CHANGE UP
TACROLIMUS SERPL-MCNC: <0.8 NG/ML — SIGNIFICANT CHANGE UP
WBC # BLD: 9.9 K/UL — SIGNIFICANT CHANGE UP (ref 3.8–10.5)
WBC # BLD: 9.9 K/UL — SIGNIFICANT CHANGE UP (ref 3.8–10.5)
WBC # FLD AUTO: 9.9 K/UL — SIGNIFICANT CHANGE UP (ref 3.8–10.5)
WBC # FLD AUTO: 9.9 K/UL — SIGNIFICANT CHANGE UP (ref 3.8–10.5)

## 2023-12-27 PROCEDURE — 99292 CRITICAL CARE ADDL 30 MIN: CPT

## 2023-12-27 PROCEDURE — 99232 SBSQ HOSP IP/OBS MODERATE 35: CPT

## 2023-12-27 PROCEDURE — 71045 X-RAY EXAM CHEST 1 VIEW: CPT | Mod: 26

## 2023-12-27 PROCEDURE — 99233 SBSQ HOSP IP/OBS HIGH 50: CPT

## 2023-12-27 PROCEDURE — 90791 PSYCH DIAGNOSTIC EVALUATION: CPT

## 2023-12-27 PROCEDURE — 99291 CRITICAL CARE FIRST HOUR: CPT

## 2023-12-27 RX ORDER — HYDRALAZINE HCL 50 MG
5 TABLET ORAL ONCE
Refills: 0 | Status: COMPLETED | OUTPATIENT
Start: 2023-12-27 | End: 2023-12-27

## 2023-12-27 RX ORDER — HYDRALAZINE HCL 50 MG
25 TABLET ORAL EVERY 12 HOURS
Refills: 0 | Status: DISCONTINUED | OUTPATIENT
Start: 2023-12-27 | End: 2023-12-27

## 2023-12-27 RX ORDER — TACROLIMUS 5 MG/1
2 CAPSULE ORAL
Refills: 0 | Status: DISCONTINUED | OUTPATIENT
Start: 2023-12-27 | End: 2023-12-28

## 2023-12-27 RX ORDER — ACETAMINOPHEN 500 MG
1000 TABLET ORAL ONCE
Refills: 0 | Status: COMPLETED | OUTPATIENT
Start: 2023-12-27 | End: 2023-12-27

## 2023-12-27 RX ORDER — POTASSIUM CHLORIDE 20 MEQ
10 PACKET (EA) ORAL
Refills: 0 | Status: COMPLETED | OUTPATIENT
Start: 2023-12-27 | End: 2023-12-27

## 2023-12-27 RX ORDER — TACROLIMUS 5 MG/1
1 CAPSULE ORAL ONCE
Refills: 0 | Status: COMPLETED | OUTPATIENT
Start: 2023-12-27 | End: 2023-12-27

## 2023-12-27 RX ORDER — HYDROMORPHONE HYDROCHLORIDE 2 MG/ML
0.5 INJECTION INTRAMUSCULAR; INTRAVENOUS; SUBCUTANEOUS ONCE
Refills: 0 | Status: DISCONTINUED | OUTPATIENT
Start: 2023-12-27 | End: 2023-12-27

## 2023-12-27 RX ORDER — BUMETANIDE 0.25 MG/ML
1 INJECTION INTRAMUSCULAR; INTRAVENOUS EVERY 12 HOURS
Refills: 0 | Status: DISCONTINUED | OUTPATIENT
Start: 2023-12-27 | End: 2023-12-31

## 2023-12-27 RX ORDER — HYDRALAZINE HCL 50 MG
25 TABLET ORAL EVERY 8 HOURS
Refills: 0 | Status: DISCONTINUED | OUTPATIENT
Start: 2023-12-27 | End: 2023-12-28

## 2023-12-27 RX ORDER — HYDRALAZINE HCL 50 MG
25 TABLET ORAL ONCE
Refills: 0 | Status: DISCONTINUED | OUTPATIENT
Start: 2023-12-27 | End: 2023-12-27

## 2023-12-27 RX ORDER — BUDESONIDE AND FORMOTEROL FUMARATE DIHYDRATE 160; 4.5 UG/1; UG/1
1 AEROSOL RESPIRATORY (INHALATION)
Refills: 0 | Status: DISCONTINUED | OUTPATIENT
Start: 2023-12-27 | End: 2024-01-09

## 2023-12-27 RX ORDER — POTASSIUM CHLORIDE 20 MEQ
10 PACKET (EA) ORAL ONCE
Refills: 0 | Status: COMPLETED | OUTPATIENT
Start: 2023-12-27 | End: 2023-12-27

## 2023-12-27 RX ORDER — HYDRALAZINE HCL 50 MG
25 TABLET ORAL ONCE
Refills: 0 | Status: COMPLETED | OUTPATIENT
Start: 2023-12-27 | End: 2023-12-27

## 2023-12-27 RX ORDER — BUMETANIDE 0.25 MG/ML
1 INJECTION INTRAMUSCULAR; INTRAVENOUS ONCE
Refills: 0 | Status: COMPLETED | OUTPATIENT
Start: 2023-12-27 | End: 2023-12-27

## 2023-12-27 RX ADMIN — GABAPENTIN 100 MILLIGRAM(S): 400 CAPSULE ORAL at 13:23

## 2023-12-27 RX ADMIN — LIDOCAINE 3 PATCH: 4 CREAM TOPICAL at 23:19

## 2023-12-27 RX ADMIN — HYDROMORPHONE HYDROCHLORIDE 0.5 MILLIGRAM(S): 2 INJECTION INTRAMUSCULAR; INTRAVENOUS; SUBCUTANEOUS at 15:30

## 2023-12-27 RX ADMIN — INSULIN HUMAN 3 UNIT(S)/HR: 100 INJECTION, SOLUTION SUBCUTANEOUS at 07:59

## 2023-12-27 RX ADMIN — SIMETHICONE 160 MILLIGRAM(S): 80 TABLET, CHEWABLE ORAL at 13:48

## 2023-12-27 RX ADMIN — POLYETHYLENE GLYCOL 3350 17 GRAM(S): 17 POWDER, FOR SOLUTION ORAL at 11:19

## 2023-12-27 RX ADMIN — SODIUM CHLORIDE 10 MILLILITER(S): 9 INJECTION INTRAMUSCULAR; INTRAVENOUS; SUBCUTANEOUS at 08:00

## 2023-12-27 RX ADMIN — HEPARIN SODIUM 5000 UNIT(S): 5000 INJECTION INTRAVENOUS; SUBCUTANEOUS at 13:23

## 2023-12-27 RX ADMIN — Medication 1000 MILLIGRAM(S): at 10:00

## 2023-12-27 RX ADMIN — NALOXEGOL OXALATE 25 MILLIGRAM(S): 12.5 TABLET, FILM COATED ORAL at 11:10

## 2023-12-27 RX ADMIN — TRAMADOL HYDROCHLORIDE 25 MILLIGRAM(S): 50 TABLET ORAL at 23:06

## 2023-12-27 RX ADMIN — Medication 50 MILLIEQUIVALENT(S): at 01:06

## 2023-12-27 RX ADMIN — HEPARIN SODIUM 5000 UNIT(S): 5000 INJECTION INTRAVENOUS; SUBCUTANEOUS at 22:38

## 2023-12-27 RX ADMIN — TRAMADOL HYDROCHLORIDE 25 MILLIGRAM(S): 50 TABLET ORAL at 05:35

## 2023-12-27 RX ADMIN — Medication 25 MILLIGRAM(S): at 17:21

## 2023-12-27 RX ADMIN — MILRINONE LACTATE 7.42 MICROGRAM(S)/KG/MIN: 1 INJECTION, SOLUTION INTRAVENOUS at 07:59

## 2023-12-27 RX ADMIN — Medication 25 MILLIGRAM(S): at 13:50

## 2023-12-27 RX ADMIN — NICARDIPINE HYDROCHLORIDE 15 MG/HR: 30 CAPSULE, EXTENDED RELEASE ORAL at 08:00

## 2023-12-27 RX ADMIN — MUPIROCIN 1 APPLICATION(S): 20 OINTMENT TOPICAL at 05:36

## 2023-12-27 RX ADMIN — Medication 125 MILLIGRAM(S): at 05:35

## 2023-12-27 RX ADMIN — TRAMADOL HYDROCHLORIDE 25 MILLIGRAM(S): 50 TABLET ORAL at 13:22

## 2023-12-27 RX ADMIN — Medication 10 MILLIGRAM(S): at 05:35

## 2023-12-27 RX ADMIN — MYCOPHENOLATE MOFETIL 83.34 MILLIGRAM(S): 250 CAPSULE ORAL at 20:24

## 2023-12-27 RX ADMIN — Medication 10 MILLIGRAM(S): at 22:38

## 2023-12-27 RX ADMIN — Medication 500000 UNIT(S): at 20:24

## 2023-12-27 RX ADMIN — Medication 50 MILLIEQUIVALENT(S): at 18:30

## 2023-12-27 RX ADMIN — Medication 10 MILLIGRAM(S): at 13:23

## 2023-12-27 RX ADMIN — Medication 400 MILLIGRAM(S): at 09:30

## 2023-12-27 RX ADMIN — HYDROMORPHONE HYDROCHLORIDE 0.5 MILLIGRAM(S): 2 INJECTION INTRAMUSCULAR; INTRAVENOUS; SUBCUTANEOUS at 11:15

## 2023-12-27 RX ADMIN — Medication 5 MILLIGRAM(S): at 13:50

## 2023-12-27 RX ADMIN — Medication 125 MILLIGRAM(S): at 17:21

## 2023-12-27 RX ADMIN — CHLORHEXIDINE GLUCONATE 1 APPLICATION(S): 213 SOLUTION TOPICAL at 07:31

## 2023-12-27 RX ADMIN — Medication 500000 UNIT(S): at 11:08

## 2023-12-27 RX ADMIN — Medication 40 MILLIGRAM(S): at 05:36

## 2023-12-27 RX ADMIN — MUPIROCIN 1 APPLICATION(S): 20 OINTMENT TOPICAL at 17:52

## 2023-12-27 RX ADMIN — HEPARIN SODIUM 5000 UNIT(S): 5000 INJECTION INTRAVENOUS; SUBCUTANEOUS at 05:36

## 2023-12-27 RX ADMIN — TACROLIMUS 0.5 MILLIGRAM(S): 5 CAPSULE ORAL at 08:00

## 2023-12-27 RX ADMIN — SIMETHICONE 160 MILLIGRAM(S): 80 TABLET, CHEWABLE ORAL at 05:35

## 2023-12-27 RX ADMIN — METHOCARBAMOL 500 MILLIGRAM(S): 500 TABLET, FILM COATED ORAL at 13:23

## 2023-12-27 RX ADMIN — BUMETANIDE 1 MILLIGRAM(S): 0.25 INJECTION INTRAMUSCULAR; INTRAVENOUS at 11:06

## 2023-12-27 RX ADMIN — SENNA PLUS 2 TABLET(S): 8.6 TABLET ORAL at 22:36

## 2023-12-27 RX ADMIN — BUDESONIDE AND FORMOTEROL FUMARATE DIHYDRATE 1 PUFF(S): 160; 4.5 AEROSOL RESPIRATORY (INHALATION) at 17:31

## 2023-12-27 RX ADMIN — Medication 25 MILLIGRAM(S): at 22:37

## 2023-12-27 RX ADMIN — TRAMADOL HYDROCHLORIDE 25 MILLIGRAM(S): 50 TABLET ORAL at 14:07

## 2023-12-27 RX ADMIN — LIDOCAINE 3 PATCH: 4 CREAM TOPICAL at 11:12

## 2023-12-27 RX ADMIN — HYDROMORPHONE HYDROCHLORIDE 0.5 MILLIGRAM(S): 2 INJECTION INTRAMUSCULAR; INTRAVENOUS; SUBCUTANEOUS at 11:00

## 2023-12-27 RX ADMIN — CEFEPIME 100 MILLIGRAM(S): 1 INJECTION, POWDER, FOR SOLUTION INTRAMUSCULAR; INTRAVENOUS at 15:32

## 2023-12-27 RX ADMIN — Medication 500000 UNIT(S): at 15:31

## 2023-12-27 RX ADMIN — HYDROMORPHONE HYDROCHLORIDE 0.5 MILLIGRAM(S): 2 INJECTION INTRAMUSCULAR; INTRAVENOUS; SUBCUTANEOUS at 15:45

## 2023-12-27 RX ADMIN — TACROLIMUS 2 MILLIGRAM(S): 5 CAPSULE ORAL at 20:25

## 2023-12-27 RX ADMIN — LIDOCAINE 3 PATCH: 4 CREAM TOPICAL at 20:56

## 2023-12-27 RX ADMIN — Medication 36 MILLIGRAM(S): at 17:21

## 2023-12-27 RX ADMIN — MYCOPHENOLATE MOFETIL 83.34 MILLIGRAM(S): 250 CAPSULE ORAL at 08:45

## 2023-12-27 RX ADMIN — GABAPENTIN 100 MILLIGRAM(S): 400 CAPSULE ORAL at 05:36

## 2023-12-27 RX ADMIN — Medication 50 MILLIEQUIVALENT(S): at 01:05

## 2023-12-27 RX ADMIN — TACROLIMUS 1 MILLIGRAM(S): 5 CAPSULE ORAL at 13:20

## 2023-12-27 RX ADMIN — METHOCARBAMOL 500 MILLIGRAM(S): 500 TABLET, FILM COATED ORAL at 05:36

## 2023-12-27 RX ADMIN — PANTOPRAZOLE SODIUM 40 MILLIGRAM(S): 20 TABLET, DELAYED RELEASE ORAL at 11:08

## 2023-12-27 RX ADMIN — GABAPENTIN 100 MILLIGRAM(S): 400 CAPSULE ORAL at 22:37

## 2023-12-27 RX ADMIN — TRAMADOL HYDROCHLORIDE 25 MILLIGRAM(S): 50 TABLET ORAL at 22:36

## 2023-12-27 RX ADMIN — METHOCARBAMOL 500 MILLIGRAM(S): 500 TABLET, FILM COATED ORAL at 22:38

## 2023-12-27 RX ADMIN — CEFEPIME 100 MILLIGRAM(S): 1 INJECTION, POWDER, FOR SOLUTION INTRAMUSCULAR; INTRAVENOUS at 08:01

## 2023-12-27 NOTE — PROGRESS NOTE ADULT - PROBLEM SELECTOR PLAN 1
- s/p OHT on 12/25. Ischemic Time: 253min  - IABP remains in place, will plan for removal later today   - remains on Epi @ 0.02 and Primacor @ 0.25  - continue to wean off Cardene gtt as tolerated  - plan to wean off Sabrina   - diuretics as needed to target CVP 8-10  - will undergo RHC/EMBx on Tuesday 1/2  - Please keep primary Dr. Banuelos [phone] in the loop per family request. - s/p OHT on 12/25. Ischemic Time: 253min  - IABP remains in place, will plan for removal later today   - remains on Epi @ 0.02 and Primacor @ 0.25  - continue to wean off Cardene gtt as tolerated  - plan to wean off Sabrina   - diuretics as needed to target CVP 8-10  - will undergo RHC/EMBx on Tuesday 1/2  - Please keep primary Dr. Banuelos [phone] in the loop per family request. - s/p OHT on 12/25. Ischemic Time: 253min  - IABP removed 12/26  - Sabrina and Epi weaned off 12/26  - remains on Primacor @ 0.125 mcg/kg/min (was reduced 12/27 AM). Hopeful to be d/c tomorrow   - remains on/off Cardene gtt  - will be given Bumex 1 mg IV, continue to target CVP 8-10  - will undergo RHC/EMBx on Tuesday 1/2  - Please keep primary Dr. Banuelos [phone] in the loop per family request. - s/p OHT on 12/25. Ischemic Time: 253min  - IABP removed 12/26  - Sabrina and Epi weaned off 12/26  - remains on Primacor @ 0.125 mcg/kg/min (was reduced 12/27 AM). Hopeful to be d/c tomorrow   - remains on/off Cardene gtt  - will be given Bumex 1 mg IV, continue to target CVP 8-10  - will undergo RHC/EMBx on Tuesday 1/2  - Please keep primary Dr. Banuelos [phone] in the loop per family request.

## 2023-12-27 NOTE — PHYSICAL THERAPY INITIAL EVALUATION ADULT - TRANSFER TRAINING, PT EVAL
Goal: Patient will perform sit to stand independently in 2 weeks
3. LTG Pt will be indep w/ transfers using apporpriate assist device w/in 4 wks

## 2023-12-27 NOTE — PROGRESS NOTE ADULT - PROBLEM SELECTOR PLAN 2
- s/p Simulect on POD 0, plan to receive second dose on POD 4 (12/29).  - Continue with Steroid taper per protocol   - Cellcept 1g BID  - plan to start tacro tonight, goal will be 12-14 - s/p Simulect on POD 0, plan to receive second dose on POD 4 (12/29).  - Continue with Steroid taper per protocol   - Continue with Cellcept 1g IV BID  - will continue to adjust tacro as needed for goal will be 12-14

## 2023-12-27 NOTE — PROGRESS NOTE ADULT - SUBJECTIVE AND OBJECTIVE BOX
Patient seen and examined at the bedside.    Remained critically ill on continuous ICU monitoring.      Brief Summary:  59yo M w/ PMHx HTN, CAD s/p stent (2009), ICH (2008), ICM now s/p OHT with IABP on 12/25/2023.      24 Hour events:  - Taken to the OR for *Surgery*  - Drips:  - Echo:   - Blood Products:       OBJECTIVE:  Vital Signs Last 24 Hrs  T(C): 36.1 (27 Dec 2023 04:00), Max: 37.1 (26 Dec 2023 20:00)  T(F): 97 (27 Dec 2023 04:00), Max: 98.8 (26 Dec 2023 20:00)  HR: 89 (27 Dec 2023 07:00) (88 - 93)  BP: --  BP(mean): --  RR: 13 (27 Dec 2023 07:00) (10 - 55)  SpO2: 94% (27 Dec 2023 07:00) (93% - 100%)    Parameters below as of 27 Dec 2023 04:00  Patient On (Oxygen Delivery Method): nasal cannula, high flow  O2 Flow (L/min): 40  O2 Concentration (%): 40                Physical Exam:   General: awake, interactive  Neurology: intact,   Respiratory: on HFNC, non-labored respirations  CV: Paced - TPM AAI 90  Abdominal: Soft, ND  : +Llamas  Extremities: warm, well perfused, moving all extremities   Skin: No Rashes, Hematoma, Ecchymosis       -------------------------------------------------------------------------------------------------------------------------------    Labs:                        8.0    9.90  )-----------( 133      ( 27 Dec 2023 00:23 )             25.0     12-27    137  |  102  |  33<H>  ----------------------------<  205<H>  3.9   |  22  |  1.34<H>    Ca    9.3      27 Dec 2023 00:23  Phos  4.9     12-27  Mg     2.1     12-27    TPro  5.9<L>  /  Alb  3.8  /  TBili  0.4  /  DBili  x   /  AST  48<H>  /  ALT  34  /  AlkPhos  42  12-27    LIVER FUNCTIONS - ( 27 Dec 2023 00:23 )  Alb: 3.8 g/dL / Pro: 5.9 g/dL / ALK PHOS: 42 U/L / ALT: 34 U/L / AST: 48 U/L / GGT: x           PT/INR - ( 27 Dec 2023 00:23 )   PT: 13.3 sec;   INR: 1.28 ratio         PTT - ( 27 Dec 2023 00:23 )  PTT:26.9 sec  ABG - ( 27 Dec 2023 03:56 )  pH, Arterial: 7.39  pH, Blood: x     /  pCO2: 38    /  pO2: 92    / HCO3: 23    / Base Excess: -1.8  /  SaO2: 98.5              ------------------------------------------------------------------------------------------------------------------------------  Assessment:  59yo M w/ PMHx HTN, CAD s/p stent (2009), ICH (2008), ICM now s/p OHT with IABP on 12/25/2023.    S/p OHT on 12/25/2023  At risk for hemodynamic instability and cardiac arrhythmias  Post op respiratory insufficiency  Receiving inotropic medication  Acute blood loss anemia  Thrombocytopenia  Stress hyperglycemia      Plan:   ***Neuro***   - Sedated with Precedex for comfort  - Gabapentin, tramadol, and Robaxin for pain  - Optimize day/night cycles.    ***Cardiovascular***  - At risk for hemodynamic instability and cardiac arrhythmias.  - Continue Milrinone for inotropic support. Wean off Epinephrine yesterday.   - Cardene for afterload reduction.  - D/daniela IABP on 12/26  - Monitor chest tube output.     ***Pulmonary***.  - Acute respiratory insufficiency  - On high flow nasal cannula. Wean oxygen as able.  - Deep breathing and coughing exercises.  - Sabrina at 3ppm. Plan to wean off today.    ***GI***  - NPO for now   - NGT to decompress abdomen.  - Protonix for stress ulcer prophylaxis   - Bowel regimen.  - Simethicone for gas  - Reglan for gut motility     ***Renal***  - Llamas catheter for strict I/O measurements.    - Replete electrolytes as indicated.    ***ID***  - Cefepime for perioperative transplant coverage  - PO Vanco for c.diff prophylaxis  - Immunosuppression - tacro, cellcept, steroids.  - Nystatin for oral thrush prophylaxis    ***Endocrine***  - Stress Hyperglycemia  - Insulin gtt as needed to maintain euglycemia.      ***Hematology***  - Acute blood loss anemia and Thrombocytopenia.  - SQ Heparin for DVT prophylaxis  - No current transfusion indication          Patient requires continuous monitoring with bedside rhythm monitoring, pulse oximetry monitoring, and continuous central venous and arterial pressure monitoring; and intermittent blood gas analysis. Care plan discussed with the ICU care team.   Patient remained critical, at risk for life threatening decompensation.    I have spent 30 minutes providing critical care management to this patient.    By signing my name below, I, Judithawilda Garber, attest that this documentation has been prepared under the direction and in the presence of Tasha Shoemaker DO  Electronically signed: Judith Garber, 12-27-23 @ 07:53    I, Tasha Shoemaker DO, personally performed the services described in this documentation. all medical record entries made by the scribe were at my direction and in my presence. I have reviewed the chart and agree that the record reflects my personal performance and is accurate and complete  Electronically signed: Tasha Shoemaker DO, Patient seen and examined at the bedside.    Remained critically ill on continuous ICU monitoring.      Brief Summary:  59yo M w/ PMHx HTN, CAD s/p stent (2009), ICH (2008), ICM now s/p OHT with IABP on 12/25/2023.      24 Hour events:  - Extubated yesterday   - Epi weaned off  - OOBTC / PT as tolerated  - Wean HFNC to NC today   - Advance diet  - Afebrile   - [place] removal      OBJECTIVE:  Vital Signs Last 24 Hrs  T(C): 36.1 (27 Dec 2023 04:00), Max: 37.1 (26 Dec 2023 20:00)  T(F): 97 (27 Dec 2023 04:00), Max: 98.8 (26 Dec 2023 20:00)  HR: 89 (27 Dec 2023 07:00) (88 - 93)  BP: --  BP(mean): --  RR: 13 (27 Dec 2023 07:00) (10 - 55)  SpO2: 94% (27 Dec 2023 07:00) (93% - 100%)    Parameters below as of 27 Dec 2023 04:00  Patient On (Oxygen Delivery Method): nasal cannula, high flow  O2 Flow (L/min): 40  O2 Concentration (%): 40                Physical Exam:   General: awake, interactive  Neurology: intact,   Respiratory: on HFNC, non-labored respirations  CV: Paced - TPM AAI 90  Abdominal: Soft, ND  : +Llamas  Extremities: warm, well perfused, moving all extremities   Skin: No Rashes, Hematoma, Ecchymosis       -------------------------------------------------------------------------------------------------------------------------------    Labs:                        8.0    9.90  )-----------( 133      ( 27 Dec 2023 00:23 )             25.0     12-27    137  |  102  |  33<H>  ----------------------------<  205<H>  3.9   |  22  |  1.34<H>    Ca    9.3      27 Dec 2023 00:23  Phos  4.9     12-27  Mg     2.1     12-27    TPro  5.9<L>  /  Alb  3.8  /  TBili  0.4  /  DBili  x   /  AST  48<H>  /  ALT  34  /  AlkPhos  42  12-27    LIVER FUNCTIONS - ( 27 Dec 2023 00:23 )  Alb: 3.8 g/dL / Pro: 5.9 g/dL / ALK PHOS: 42 U/L / ALT: 34 U/L / AST: 48 U/L / GGT: x           PT/INR - ( 27 Dec 2023 00:23 )   PT: 13.3 sec;   INR: 1.28 ratio         PTT - ( 27 Dec 2023 00:23 )  PTT:26.9 sec  ABG - ( 27 Dec 2023 03:56 )  pH, Arterial: 7.39  pH, Blood: x     /  pCO2: 38    /  pO2: 92    / HCO3: 23    / Base Excess: -1.8  /  SaO2: 98.5              ------------------------------------------------------------------------------------------------------------------------------  Assessment:  59yo M w/ PMHx HTN, CAD s/p stent (2009), ICH (2008), ICM now s/p OHT with IABP on 12/25/2023.    S/p OHT on 12/25/2023  At risk for hemodynamic instability and cardiac arrhythmias  Post op respiratory insufficiency  Receiving inotropic medication  Acute blood loss anemia  Thrombocytopenia  Stress hyperglycemia      Plan:   ***Neuro***   - Sedated with Precedex for comfort  - Gabapentin, tramadol, and Robaxin for pain  - Optimize day/night cycles.  - OOBTC / PT as tolerated    ***Cardiovascular***  - At risk for hemodynamic instability and cardiac arrhythmias.  - Continue Milrinone for inotropic support. Wean off Epinephrine yesterday.   - Cardene for afterload reduction.  - D/daniela IABP on 12/26  - Monitor chest tube output.   - [place] removal    ***Pulmonary***.  - Acute respiratory insufficiency  - Extubated yesterday   - Wean HFNC to Nc today   - On high flow nasal cannula. Wean oxygen as able.  - Deep breathing and coughing exercises.  - Sabrina at 3ppm. Plan to wean off today.    ***GI***  - NPO for now --> Advance diet  - NGT to decompress abdomen.  - Protonix for stress ulcer prophylaxis   - Bowel regimen.  - Simethicone for gas  - Reglan for gut motility     ***Renal***  - Llamas catheter for strict I/O measurements.    - Replete electrolytes as indicated.    ***ID***  - Cefepime for perioperative transplant coverage  - PO Vanco for c.diff prophylaxis  - Immunosuppression - tacro, cellcept, steroids.  - Nystatin for oral thrush prophylaxis  - Afebrile     ***Endocrine***  - Stress Hyperglycemia  - Insulin gtt as needed to maintain euglycemia.      ***Hematology***  - Acute blood loss anemia and Thrombocytopenia.  - SQ Heparin for DVT prophylaxis  - No current transfusion indication          Patient requires continuous monitoring with bedside rhythm monitoring, pulse oximetry monitoring, and continuous central venous and arterial pressure monitoring; and intermittent blood gas analysis. Care plan discussed with the ICU care team.   Patient remained critical, at risk for life threatening decompensation.    I have spent 30 minutes providing critical care management to this patient.    By signing my name below, I, Judith Garber, attest that this documentation has been prepared under the direction and in the presence of Tasha Shoemaker DO  Electronically signed: Judith Garber, 12-27-23 @ 07:53    I, Tasha Shoemaker DO, personally performed the services described in this documentation. all medical record entries made by the scribe were at my direction and in my presence. I have reviewed the chart and agree that the record reflects my personal performance and is accurate and complete  Electronically signed: Tasha Shoemaker DO, Patient seen and examined at the bedside.    Remained critically ill on continuous ICU monitoring.      Brief Summary:  59yo M w/ PMHx HTN, CAD s/p stent (2009), ICH (2008), ICM now s/p OHT with IABP on 12/25/2023.      24 Hour events:  - Extubated yesterday   - Epi weaned off  - OOBTC / PT as tolerated  - Wean HFNC to NC today   - Advance diet  - Afebrile   - [place] removal      OBJECTIVE:  Vital Signs Last 24 Hrs  T(C): 36.1 (27 Dec 2023 04:00), Max: 37.1 (26 Dec 2023 20:00)  T(F): 97 (27 Dec 2023 04:00), Max: 98.8 (26 Dec 2023 20:00)  HR: 89 (27 Dec 2023 07:00) (88 - 93)  BP: --  BP(mean): --  RR: 13 (27 Dec 2023 07:00) (10 - 55)  SpO2: 94% (27 Dec 2023 07:00) (93% - 100%)    Parameters below as of 27 Dec 2023 04:00  Patient On (Oxygen Delivery Method): nasal cannula, high flow  O2 Flow (L/min): 40  O2 Concentration (%): 40                Physical Exam:   General: awake, interactive  Neurology: intact,   Respiratory: on HFNC, non-labored respirations  CV: Paced - TPM AAI 90  Abdominal: Soft, ND  : +Llamas  Extremities: warm, well perfused, moving all extremities   Skin: No Rashes, Hematoma, Ecchymosis       -------------------------------------------------------------------------------------------------------------------------------    Labs:                        8.0    9.90  )-----------( 133      ( 27 Dec 2023 00:23 )             25.0     12-27    137  |  102  |  33<H>  ----------------------------<  205<H>  3.9   |  22  |  1.34<H>    Ca    9.3      27 Dec 2023 00:23  Phos  4.9     12-27  Mg     2.1     12-27    TPro  5.9<L>  /  Alb  3.8  /  TBili  0.4  /  DBili  x   /  AST  48<H>  /  ALT  34  /  AlkPhos  42  12-27    LIVER FUNCTIONS - ( 27 Dec 2023 00:23 )  Alb: 3.8 g/dL / Pro: 5.9 g/dL / ALK PHOS: 42 U/L / ALT: 34 U/L / AST: 48 U/L / GGT: x           PT/INR - ( 27 Dec 2023 00:23 )   PT: 13.3 sec;   INR: 1.28 ratio         PTT - ( 27 Dec 2023 00:23 )  PTT:26.9 sec  ABG - ( 27 Dec 2023 03:56 )  pH, Arterial: 7.39  pH, Blood: x     /  pCO2: 38    /  pO2: 92    / HCO3: 23    / Base Excess: -1.8  /  SaO2: 98.5              ------------------------------------------------------------------------------------------------------------------------------  Assessment:  59yo M w/ PMHx HTN, CAD s/p stent (2009), ICH (2008), ICM now s/p OHT with IABP on 12/25/2023.    S/p OHT on 12/25/2023  At risk for hemodynamic instability and cardiac arrhythmias  Post op respiratory insufficiency  Receiving inotropic medication  Acute blood loss anemia  Thrombocytopenia  Stress hyperglycemia      Plan:   ***Neuro***   - Sedated with Precedex for comfort  - Gabapentin, tramadol, and Robaxin for pain  - Optimize day/night cycles.  - OOBTC / PT as tolerated    ***Cardiovascular***  - At risk for hemodynamic instability and cardiac arrhythmias.  - Continue Milrinone for inotropic support. Wean off Epinephrine yesterday.   - Cardene for afterload reduction.  - D/daniela IABP on 12/26  - Monitor chest tube output.   - [place] removal    ***Pulmonary***.  - Acute respiratory insufficiency  - Extubated yesterday   - Wean HFNC to Nc today   - On high flow nasal cannula. Wean oxygen as able.  - Deep breathing and coughing exercises.  - Sabrina at 3ppm. Plan to wean off today.    ***GI***  - NPO for now --> Advance diet  - NGT to decompress abdomen.  - Protonix for stress ulcer prophylaxis   - Bowel regimen.  - Simethicone for gas  - Reglan for gut motility     ***Renal***  - Llamas catheter for strict I/O measurements.    - Replete electrolytes as indicated.    ***ID***  - Cefepime for perioperative transplant coverage  - PO Vanco for c.diff prophylaxis  - Immunosuppression - tacro, cellcept, steroids.  - Nystatin for oral thrush prophylaxis  - Afebrile     ***Endocrine***  - Stress Hyperglycemia  - Insulin gtt as needed to maintain euglycemia.      ***Hematology***  - Acute blood loss anemia and Thrombocytopenia.  - SQ Heparin for DVT prophylaxis  - No current transfusion indication          Patient requires continuous monitoring with bedside rhythm monitoring, pulse oximetry monitoring, and continuous central venous and arterial pressure monitoring; and intermittent blood gas analysis. Care plan discussed with the ICU care team.   Patient remained critical, at risk for life threatening decompensation.    I have spent 30 minutes providing critical care management to this patient.    By signing my name below, I, Judith Garber, attest that this documentation has been prepared under the direction and in the presence of Tasha Shoemaker DO  Electronically signed: Judith Garber, 12-27-23 @ 07:53    I, Tasha Shoemaker DO, personally performed the services described in this documentation. all medical record entries made by the scribe were at my direction and in my presence. I have reviewed the chart and agree that the record reflects my personal performance and is accurate and complete  Electronically signed: Tasha Shoemaker DO, Patient seen and examined at the bedside.    Remained critically ill on continuous ICU monitoring.      Brief Summary:  59yo M w/ PMHx HTN, CAD s/p stent (2009), ICH (2008), ICM now s/p OHT with IABP on 12/25/2023.      24 Hour events:  - IABP removed.  - Epi d/c'd.  - Sabrina weaned off.      OBJECTIVE:  Vital Signs Last 24 Hrs  T(C): 36.1 (27 Dec 2023 04:00), Max: 37.1 (26 Dec 2023 20:00)  T(F): 97 (27 Dec 2023 04:00), Max: 98.8 (26 Dec 2023 20:00)  HR: 89 (27 Dec 2023 07:00) (88 - 93)  BP: --  BP(mean): --  RR: 13 (27 Dec 2023 07:00) (10 - 55)  SpO2: 94% (27 Dec 2023 07:00) (93% - 100%)    Parameters below as of 27 Dec 2023 04:00  Patient On (Oxygen Delivery Method): nasal cannula, high flow  O2 Flow (L/min): 40  O2 Concentration (%): 40                Physical Exam:   General: awake, interactive  Neurology: intact, AAOx4  Respiratory: on HFNC, non-labored respirations  CV: A-paced at 90  Abdominal: Soft, NT  : +Llamas  Extremities: warm to palpation  Skin: No Rashes, Hematoma, Ecchymosis       -------------------------------------------------------------------------------------------------------------------------------    Labs:                        8.0    9.90  )-----------( 133      ( 27 Dec 2023 00:23 )             25.0     12-27    137  |  102  |  33<H>  ----------------------------<  205<H>  3.9   |  22  |  1.34<H>    Ca    9.3      27 Dec 2023 00:23  Phos  4.9     12-27  Mg     2.1     12-27    TPro  5.9<L>  /  Alb  3.8  /  TBili  0.4  /  DBili  x   /  AST  48<H>  /  ALT  34  /  AlkPhos  42  12-27    LIVER FUNCTIONS - ( 27 Dec 2023 00:23 )  Alb: 3.8 g/dL / Pro: 5.9 g/dL / ALK PHOS: 42 U/L / ALT: 34 U/L / AST: 48 U/L / GGT: x           PT/INR - ( 27 Dec 2023 00:23 )   PT: 13.3 sec;   INR: 1.28 ratio         PTT - ( 27 Dec 2023 00:23 )  PTT:26.9 sec  ABG - ( 27 Dec 2023 03:56 )  pH, Arterial: 7.39  pH, Blood: x     /  pCO2: 38    /  pO2: 92    / HCO3: 23    / Base Excess: -1.8  /  SaO2: 98.5              ------------------------------------------------------------------------------------------------------------------------------  Assessment:  59yo M w/ PMHx HTN, CAD s/p stent (2009), ICH (2008), ICM now s/p OHT with IABP on 12/25/2023.    S/p OHT on 12/25/2023  At risk for hemodynamic instability and cardiac arrhythmias  Post op respiratory insufficiency  Receiving inotropic medication  Acute blood loss anemia  Thrombocytopenia  Stress hyperglycemia      Plan:   ***Neuro***  - Post-operative pain control with Gabapentin, tramadol, and Robaxin for pain  - Optimize day/night cycles.  - OOBTC / PT as tolerated    ***Cardiovascular***  - At risk for hemodynamic instability and cardiac arrhythmias.  - Wean milrinone as tolerated.  - Cardene for afterload reduction.  - Monitor chest tube output.   - D/c PA catheter.  - D/c femoral a-line.    ***Pulmonary***.  - Acute respiratory insufficiency  - Wean oxygen as able.  - Deep breathing and coughing exercises.    ***GI***  - Continue diet.  - Protonix for stress ulcer prophylaxis   - Bowel regimen.    ***Renal***  - Llamas catheter for strict I/O measurements.    - Replete electrolytes as indicated.    ***ID***  - Cefepime for perioperative transplant coverage  - PO Vanco for c.diff prophylaxis  - Immunosuppression - tacro, cellcept, steroids.  - Nystatin for prophylaxis    ***Endocrine***  - Stress Hyperglycemia  - Insulin gtt as needed to maintain euglycemia.      ***Hematology***  - Acute blood loss anemia  - SQ Heparin for DVT prophylaxis  - No current transfusion indication          Patient requires continuous monitoring with bedside rhythm monitoring, pulse oximetry monitoring, and continuous central venous and arterial pressure monitoring; and intermittent blood gas analysis. Care plan discussed with the ICU care team.   Patient remained critical, at risk for life threatening decompensation.    I have spent 30 minutes providing critical care management to this patient.    By signing my name below, I, Judith Ferroginny, attest that this documentation has been prepared under the direction and in the presence of Tasha Shoemaker DO  Electronically signed: Judith Jcarlos, 12-27-23 @ 07:53    I, Tasha Shoemaker DO, personally performed the services described in this documentation. all medical record entries made by the scribe were at my direction and in my presence. I have reviewed the chart and agree that the record reflects my personal performance and is accurate and complete  Electronically signed: Tasha Shoemaker DO,

## 2023-12-27 NOTE — PHYSICAL THERAPY INITIAL EVALUATION ADULT - GAIT TRAINING, PT EVAL
Goal: Patient will ambulate 200 feet independently in 2 weeks
4. LTG Pt will be indep to amb approx 300ft w/ appropriate assist device w/in 4 wks

## 2023-12-27 NOTE — PROGRESS NOTE ADULT - CRITICAL CARE ATTENDING COMMENT
ABO: A, listed status 2 PAR 0%  s/p OHT 12/25, DBD donor, IST: 253mins,   Hemo's RA 11, PA: 32/14(22), PA sat: 67%,     IABP removed 12/26, Sabrina weaned off and epi weaned off   wean milrinone 0.125  remove swan   monitor CVP and SVC sat  wean cardine and add hydral po  diuretics to keep Net neg 1lit  TTE tomorrow    IS: s/p simulect 12/25 , next dose pod 4  increase tacro 2mg po BID, daily monrning level  cont cellcept and solumedrol stacie    OI: CMV -/+ and toxo -/-  will start ppx when tolerating PO  Will start nystatin   cont umm op abx    Blood Sugar management    tolerating PO  IS, OOB to chair and ambulate  DVT/GI PPX  advance po diet as tolerated  GI PPX  feed as tolerated

## 2023-12-27 NOTE — PROGRESS NOTE ADULT - SUBJECTIVE AND OBJECTIVE BOX
Patient seen and examined at the bedside.    Remained critically ill on continuous ICU monitoring.    OBJECTIVE:  Vital Signs Last 24 Hrs  T(C): 36.6 (27 Dec 2023 16:00), Max: 36.7 (27 Dec 2023 12:00)  T(F): 97.9 (27 Dec 2023 16:00), Max: 98 (27 Dec 2023 12:00)  HR: 84 (27 Dec 2023 20:00) (83 - 93)  BP: --  BP(mean): --  RR: 20 (27 Dec 2023 20:00) (11 - 55)  SpO2: 93% (27 Dec 2023 20:00) (91% - 97%)    Parameters below as of 27 Dec 2023 20:00  Patient On (Oxygen Delivery Method): nasal cannula, high flow    O2 Concentration (%): 30      Physical Exam:   General: NAD   Neurology: nonfocal   Eyes: bilateral pupils equal and reactive   ENT/Neck: Neck supple, trachea midline, No JVD   Respiratory: Clear bilaterally   CV: S1S2, no murmurs        [x] Sternal dressing, [x] Mediastinal CTx3, [x] Bilateral Pleural CT        [x] Sinus rhythm, [x] TPM - DDD 60  Abdominal: Soft, NT, ND +BS   Extremities: 1-2+ pedal edema noted, + peripheral pulses                    Assessment:  59yo M w/ PMHx HTN, CAD s/p stent (2009), ICH (2008), ICM now s/p OHT with IABP on 12/25/2023.    S/p OHT on 12/25/23  s/p IABP removal 12/26/23  acute systolic congestive heart failure  encounter temporary cardiac pacing  At risk for hemodynamic instability and cardiac arrhythmias  Post op respiratory insufficiency  Receiving inotropic medication  Acute blood loss anemia  Stress hyperglycemia    Plan:   ***Neuro***  [x] Nonfocal  Post operative neuro assessment   Pain management with Gabapentin, Robaxin, Tramadol and prns     ***Cardiovascular***  Invasive hemodynamic monitoring, assess perfusion indices   SR / CVP 12 / MAP 74 / Hct 25.0% / Lactate 1.1  [x] Primacor 0.25 mcgs/kG/Min   [x] Cardene 3 mg/hr for afterload reduction.   Hydralazine for BP support  Reassessment of hemodynamics post resuscitation   Monitor chest tube outputs  [x] VTE ppx with Heparin SQ     ***Pulmonary***  [x] HFNC - 30%/30L  Continue on bronchodilators as needed  Encourage incentive spirometry, continue pulse ox monitoring, follow ABGs     ***GI***  [x] Consistent Carb diet  [x] Protonix for stress ulcer prophylaxis  Bowel regimen with Miralax and Senna   Reglan for gut motility   Simethicone for gas   Continue Movantik     ***Renal***  Continue to monitor I/Os, BUN/Creatinine.   Replete lytes PRN  Llamas present   Diuresis with Bumex    ***ID***  Cefepime for perioperative transplant coverage  PO Vanco for c.diff prophylaxis  Immunosuppression with tacro, cellcept, solumedrol  Nystatin for oral thrush ppx    ***Endocrine***  [x] DM2: HbA1c 8.3%                - [x] Insulin gtt               - Need tight glycemic control to prevent wound infection.    Patient requires continuous monitoring with bedside rhythm monitoring, pulse oximetry monitoring, and continuous central venous and arterial pressure monitoring; and intermittent blood gas analysis. Care plan discussed with the ICU care team.   Patient remained critical, at risk for life threatening decompensation.    I have spent 75 minutes providing critical care management to this patient.    By signing my name below, I, Sonia Preciado, attest that this documentation has been prepared under the direction and in the presence of Alyssa Vo NP  Electronically signed: Rosalba Freitas, 12-27-23 @ 20:35    I, Alyssa Vo NP, personally performed the services described in this documentation. all medical record entries made by the eileenibmartha were at my direction and in my presence. I have reviewed the chart and agree that the record reflects my personal performance and is accurate and complete  Electronically signed: Alyssa Vo NP

## 2023-12-27 NOTE — PROGRESS NOTE ADULT - PROBLEM SELECTOR PLAN 4
- pre transplant had baseline Cr 1.2, peaked to 4, now at relative baseline  - cardiorenal following  - diuretics as stated above   - monitor closely

## 2023-12-27 NOTE — PROGRESS NOTE ADULT - PROBLEM SELECTOR PLAN 5
- pretransplant was noted to have positive BCx staph epi, Enterococcus faecalis and Cutibacterium   - s/p IABP exchange from RFA to LFA on 11/20.  - appreciated transplant ID recs.  - post transplant has been afebrile  - remains on IV abx

## 2023-12-27 NOTE — PROGRESS NOTE ADULT - SUBJECTIVE AND OBJECTIVE BOX
ADVANCED HEART FAILURE & TRANSPLANT- PROGRESS NOTE  *To reach the NS1 Team from 8am to 5pm, please call [phone].  ___________________________________________________________________________    Subjective:  -     Medications:  cefepime   IVPB 1000 milliGRAM(s) IV Intermittent every 8 hours  chlorhexidine 4% Liquid 1 Application(s) Topical <User Schedule>  dexMEDEtomidine Infusion 0.5 MICROgram(s)/kG/Hr IV Continuous <Continuous>  dextrose 50% Injectable 50 milliLiter(s) IV Push every 15 minutes  gabapentin 100 milliGRAM(s) Oral every 8 hours  heparin   Injectable 5000 Unit(s) SubCutaneous every 8 hours  insulin regular Infusion 3 Unit(s)/Hr IV Continuous <Continuous>  lidocaine   4% Patch 3 Patch Transdermal daily  methocarbamol 500 milliGRAM(s) Oral every 8 hours  methylPREDNISolone sodium succinate Injectable 36 milliGRAM(s) IV Push every 12 hours  methylPREDNISolone sodium succinate Injectable   IV Push   metoclopramide Injectable 10 milliGRAM(s) IV Push every 8 hours  milrinone Infusion 0.25 MICROgram(s)/kG/Min IV Continuous <Continuous>  mupirocin 2% Ointment 1 Application(s) Topical two times a day  mycophenolate mofetil IVPB 1000 milliGRAM(s) IV Intermittent <User Schedule>  naloxegol 25 milliGRAM(s) Oral daily  niCARdipine Infusion 3 mG/Hr IV Continuous <Continuous>  nystatin    Suspension 066420 Unit(s) Oral <User Schedule>  pantoprazole  Injectable 40 milliGRAM(s) IV Push daily  polyethylene glycol 3350 17 Gram(s) Oral daily  senna 2 Tablet(s) Oral at bedtime  simethicone 160 milliGRAM(s) Chew every 8 hours  sodium chloride 0.9%. 1000 milliLiter(s) IV Continuous <Continuous>  tacrolimus 0.5 milliGRAM(s) SubLingual <User Schedule>  tiotropium 2.5 MICROgram(s) Inhaler 2 Puff(s) Inhalation daily  traMADol 25 milliGRAM(s) Oral every 8 hours  vancomycin    Solution 125 milliGRAM(s) Oral every 12 hours      ICU Vital Signs Last 24 Hrs  T(C): 36.1 (27 Dec 2023 04:00), Max: 37.1 (26 Dec 2023 20:00)  T(F): 97 (27 Dec 2023 04:00), Max: 98.8 (26 Dec 2023 20:00)  HR: 89 (27 Dec 2023 07:00) (88 - 93)  BP: --  BP(mean): --  ABP: 105/54 (27 Dec 2023 07:00) (88/46 - 146/68)  ABP(mean): 72 (27 Dec 2023 07:) (59 - 108)  RR: 13 (27 Dec 2023 07:) (10 - 55)  SpO2: 94% (27 Dec 2023 07:) (93% - 100%)    O2 Parameters below as of 27 Dec 2023 04:00  Patient On (Oxygen Delivery Method): nasal cannula, high flow  O2 Flow (L/min): 40  O2 Concentration (%): 40        Weight in k.8 ( @ 00:00)    I&O's Summary    26 Dec 2023 07:01  -  27 Dec 2023 07:00  --------------------------------------------------------  IN: 1699.3 mL / OUT: 3575 mL / NET: -1875.7 mL    27 Dec 2023 07:01  -  27 Dec 2023 07:57  --------------------------------------------------------  IN: 0 mL / OUT: 105 mL / NET: -105 mL        Physical Exam  General: No distress. Comfortable.  Neck: Neck supple. JVP not elevated. No masses  Chest: Clear to auscultation bilaterally  CV: Normal S1 and S2.   Abdomen: Soft, non-distended, non-tender  Extremities: warm and perfused  Neurology: Alert and oriented times three    Labs:                        8.0    9.90  )-----------( 133      ( 27 Dec 2023 00:23 )             25.0         137  |  102  |  33<H>  ----------------------------<  205<H>  3.9   |  22  |  1.34<H>    Ca    9.3      27 Dec 2023 00:23  Phos  4.9       Mg     2.1         TPro  5.9<L>  /  Alb  3.8  /  TBili  0.4  /  DBili  x   /  AST  48<H>  /  ALT  34  /  AlkPhos  42      PT/INR - ( 27 Dec 2023 00:23 )   PT: 13.3 sec;   INR: 1.28 ratio         PTT - ( 27 Dec 2023 00:23 )  PTT:26.9 sec        Oxygen Saturation, Mixed: 69.6 ( @ 03:56)  Oxygen Saturation, Mixed: 74.9 ( @ 00:00)  Oxygen Saturation, Mixed: 69.5 ( @ 19:58)  Oxygen Saturation, Mixed: 74.5 ( @ 18:45)  Oxygen Saturation, Mixed: 76.3 ( @ 16:50)  Oxygen Saturation, Mixed: 78.6 ( @ 14:10)  Oxygen Saturation, Mixed: 82.1 ( @ 11:55)  Oxygen Saturation, Mixed: 84.5 ( @ 08:05)  Oxygen Saturation, Mixed: 80.2 ( @ 00:00)  Oxygen Saturation, Mixed: 74.0 ( @ 19:57)  Oxygen Saturation, Mixed: 71.7 ( @ 15:40)  Oxygen Saturation, Mixed: 74.1 ( @ 14:35)  Oxygen Saturation, Mixed: 82.2 ( @ 09:18)     ADVANCED HEART FAILURE & TRANSPLANT- PROGRESS NOTE  *To reach the NS1 Team from 8am to 5pm, please call [phone].  ___________________________________________________________________________    Subjective:  -     Medications:  cefepime   IVPB 1000 milliGRAM(s) IV Intermittent every 8 hours  chlorhexidine 4% Liquid 1 Application(s) Topical <User Schedule>  dexMEDEtomidine Infusion 0.5 MICROgram(s)/kG/Hr IV Continuous <Continuous>  dextrose 50% Injectable 50 milliLiter(s) IV Push every 15 minutes  gabapentin 100 milliGRAM(s) Oral every 8 hours  heparin   Injectable 5000 Unit(s) SubCutaneous every 8 hours  insulin regular Infusion 3 Unit(s)/Hr IV Continuous <Continuous>  lidocaine   4% Patch 3 Patch Transdermal daily  methocarbamol 500 milliGRAM(s) Oral every 8 hours  methylPREDNISolone sodium succinate Injectable 36 milliGRAM(s) IV Push every 12 hours  methylPREDNISolone sodium succinate Injectable   IV Push   metoclopramide Injectable 10 milliGRAM(s) IV Push every 8 hours  milrinone Infusion 0.25 MICROgram(s)/kG/Min IV Continuous <Continuous>  mupirocin 2% Ointment 1 Application(s) Topical two times a day  mycophenolate mofetil IVPB 1000 milliGRAM(s) IV Intermittent <User Schedule>  naloxegol 25 milliGRAM(s) Oral daily  niCARdipine Infusion 3 mG/Hr IV Continuous <Continuous>  nystatin    Suspension 220006 Unit(s) Oral <User Schedule>  pantoprazole  Injectable 40 milliGRAM(s) IV Push daily  polyethylene glycol 3350 17 Gram(s) Oral daily  senna 2 Tablet(s) Oral at bedtime  simethicone 160 milliGRAM(s) Chew every 8 hours  sodium chloride 0.9%. 1000 milliLiter(s) IV Continuous <Continuous>  tacrolimus 0.5 milliGRAM(s) SubLingual <User Schedule>  tiotropium 2.5 MICROgram(s) Inhaler 2 Puff(s) Inhalation daily  traMADol 25 milliGRAM(s) Oral every 8 hours  vancomycin    Solution 125 milliGRAM(s) Oral every 12 hours      ICU Vital Signs Last 24 Hrs  T(C): 36.1 (27 Dec 2023 04:00), Max: 37.1 (26 Dec 2023 20:00)  T(F): 97 (27 Dec 2023 04:00), Max: 98.8 (26 Dec 2023 20:00)  HR: 89 (27 Dec 2023 07:00) (88 - 93)  BP: --  BP(mean): --  ABP: 105/54 (27 Dec 2023 07:00) (88/46 - 146/68)  ABP(mean): 72 (27 Dec 2023 07:) (59 - 108)  RR: 13 (27 Dec 2023 07:) (10 - 55)  SpO2: 94% (27 Dec 2023 07:) (93% - 100%)    O2 Parameters below as of 27 Dec 2023 04:00  Patient On (Oxygen Delivery Method): nasal cannula, high flow  O2 Flow (L/min): 40  O2 Concentration (%): 40        Weight in k.8 ( @ 00:00)    I&O's Summary    26 Dec 2023 07:01  -  27 Dec 2023 07:00  --------------------------------------------------------  IN: 1699.3 mL / OUT: 3575 mL / NET: -1875.7 mL    27 Dec 2023 07:01  -  27 Dec 2023 07:57  --------------------------------------------------------  IN: 0 mL / OUT: 105 mL / NET: -105 mL        Physical Exam  General: No distress. Comfortable.  Neck: Neck supple. JVP not elevated. No masses  Chest: Clear to auscultation bilaterally  CV: Normal S1 and S2.   Abdomen: Soft, non-distended, non-tender  Extremities: warm and perfused  Neurology: Alert and oriented times three    Labs:                        8.0    9.90  )-----------( 133      ( 27 Dec 2023 00:23 )             25.0         137  |  102  |  33<H>  ----------------------------<  205<H>  3.9   |  22  |  1.34<H>    Ca    9.3      27 Dec 2023 00:23  Phos  4.9       Mg     2.1         TPro  5.9<L>  /  Alb  3.8  /  TBili  0.4  /  DBili  x   /  AST  48<H>  /  ALT  34  /  AlkPhos  42      PT/INR - ( 27 Dec 2023 00:23 )   PT: 13.3 sec;   INR: 1.28 ratio         PTT - ( 27 Dec 2023 00:23 )  PTT:26.9 sec        Oxygen Saturation, Mixed: 69.6 ( @ 03:56)  Oxygen Saturation, Mixed: 74.9 ( @ 00:00)  Oxygen Saturation, Mixed: 69.5 ( @ 19:58)  Oxygen Saturation, Mixed: 74.5 ( @ 18:45)  Oxygen Saturation, Mixed: 76.3 ( @ 16:50)  Oxygen Saturation, Mixed: 78.6 ( @ 14:10)  Oxygen Saturation, Mixed: 82.1 ( @ 11:55)  Oxygen Saturation, Mixed: 84.5 ( @ 08:05)  Oxygen Saturation, Mixed: 80.2 ( @ 00:00)  Oxygen Saturation, Mixed: 74.0 ( @ 19:57)  Oxygen Saturation, Mixed: 71.7 ( @ 15:40)  Oxygen Saturation, Mixed: 74.1 ( @ 14:35)  Oxygen Saturation, Mixed: 82.2 ( @ 09:18)     ADVANCED HEART FAILURE & TRANSPLANT- PROGRESS NOTE  *To reach the NS1 Team from 8am to 5pm, please call [phone].  ___________________________________________________________________________    Subjective:  - feels better today     Medications:  cefepime   IVPB 1000 milliGRAM(s) IV Intermittent every 8 hours  chlorhexidine 4% Liquid 1 Application(s) Topical <User Schedule>  dexMEDEtomidine Infusion 0.5 MICROgram(s)/kG/Hr IV Continuous <Continuous>  dextrose 50% Injectable 50 milliLiter(s) IV Push every 15 minutes  gabapentin 100 milliGRAM(s) Oral every 8 hours  heparin   Injectable 5000 Unit(s) SubCutaneous every 8 hours  insulin regular Infusion 3 Unit(s)/Hr IV Continuous <Continuous>  lidocaine   4% Patch 3 Patch Transdermal daily  methocarbamol 500 milliGRAM(s) Oral every 8 hours  methylPREDNISolone sodium succinate Injectable 36 milliGRAM(s) IV Push every 12 hours  methylPREDNISolone sodium succinate Injectable   IV Push   metoclopramide Injectable 10 milliGRAM(s) IV Push every 8 hours  milrinone Infusion 0.25 MICROgram(s)/kG/Min IV Continuous <Continuous>  mupirocin 2% Ointment 1 Application(s) Topical two times a day  mycophenolate mofetil IVPB 1000 milliGRAM(s) IV Intermittent <User Schedule>  naloxegol 25 milliGRAM(s) Oral daily  niCARdipine Infusion 3 mG/Hr IV Continuous <Continuous>  nystatin    Suspension 236281 Unit(s) Oral <User Schedule>  pantoprazole  Injectable 40 milliGRAM(s) IV Push daily  polyethylene glycol 3350 17 Gram(s) Oral daily  senna 2 Tablet(s) Oral at bedtime  simethicone 160 milliGRAM(s) Chew every 8 hours  sodium chloride 0.9%. 1000 milliLiter(s) IV Continuous <Continuous>  tacrolimus 0.5 milliGRAM(s) SubLingual <User Schedule>  tiotropium 2.5 MICROgram(s) Inhaler 2 Puff(s) Inhalation daily  traMADol 25 milliGRAM(s) Oral every 8 hours  vancomycin    Solution 125 milliGRAM(s) Oral every 12 hours      ICU Vital Signs Last 24 Hrs  T(C): 36.1 (27 Dec 2023 04:00), Max: 37.1 (26 Dec 2023 20:00)  T(F): 97 (27 Dec 2023 04:00), Max: 98.8 (26 Dec 2023 20:00)  HR: 89 (27 Dec 2023 07:) (88 - 93)  BP: --  BP(mean): --  ABP: 105/54 (27 Dec 2023 07:00) (88/46 - 146/68)  ABP(mean): 72 (27 Dec 2023 07:) (59 - 108)  RR: 13 (27 Dec 2023 07:) (10 - 55)  SpO2: 94% (27 Dec 2023 07:) (93% - 100%)    O2 Parameters below as of 27 Dec 2023 04:00  Patient On (Oxygen Delivery Method): nasal cannula, high flow  O2 Flow (L/min): 40  O2 Concentration (%): 40        Weight in k.8 (- @ 00:00)    I&O's Summary    26 Dec 2023 07:01  -  27 Dec 2023 07:00  --------------------------------------------------------  IN: 1699.3 mL / OUT: 3575 mL / NET: -1875.7 mL    27 Dec 2023 07:01  -  27 Dec 2023 07:57  --------------------------------------------------------  IN: 0 mL / OUT: 105 mL / NET: -105 mL        Physical Exam  General: No distress. Comfortable.  Neck: Neck supple. JVP not elevated. No masses  Chest: Clear to auscultation bilaterally  CV: Normal S1 and S2.   Abdomen: Soft, non-distended, non-tender  Extremities: warm and perfused  Neurology: Alert and oriented times three    Labs:                        8.0    9.90  )-----------( 133      ( 27 Dec 2023 00:23 )             25.0         137  |  102  |  33<H>  ----------------------------<  205<H>  3.9   |  22  |  1.34<H>    Ca    9.3      27 Dec 2023 00:23  Phos  4.9       Mg     2.1         TPro  5.9<L>  /  Alb  3.8  /  TBili  0.4  /  DBili  x   /  AST  48<H>  /  ALT  34  /  AlkPhos  42      PT/INR - ( 27 Dec 2023 00:23 )   PT: 13.3 sec;   INR: 1.28 ratio         PTT - ( 27 Dec 2023 00:23 )  PTT:26.9 sec        Oxygen Saturation, Mixed: 69.6 ( @ 03:56)  Oxygen Saturation, Mixed: 74.9 ( @ 00:00)  Oxygen Saturation, Mixed: 69.5 ( @ 19:58)  Oxygen Saturation, Mixed: 74.5 ( @ 18:45)  Oxygen Saturation, Mixed: 76.3 ( @ 16:50)  Oxygen Saturation, Mixed: 78.6 ( @ 14:10)  Oxygen Saturation, Mixed: 82.1 ( @ 11:55)  Oxygen Saturation, Mixed: 84.5 ( @ 08:05)  Oxygen Saturation, Mixed: 80.2 ( @ 00:00)  Oxygen Saturation, Mixed: 74.0 ( @ 19:57)  Oxygen Saturation, Mixed: 71.7 ( @ 15:40)  Oxygen Saturation, Mixed: 74.1 ( @ 14:35)  Oxygen Saturation, Mixed: 82.2 ( @ 09:18)     ADVANCED HEART FAILURE & TRANSPLANT- PROGRESS NOTE  *To reach the NS1 Team from 8am to 5pm, please call [phone].  ___________________________________________________________________________    Subjective:  - feels better today     Medications:  cefepime   IVPB 1000 milliGRAM(s) IV Intermittent every 8 hours  chlorhexidine 4% Liquid 1 Application(s) Topical <User Schedule>  dexMEDEtomidine Infusion 0.5 MICROgram(s)/kG/Hr IV Continuous <Continuous>  dextrose 50% Injectable 50 milliLiter(s) IV Push every 15 minutes  gabapentin 100 milliGRAM(s) Oral every 8 hours  heparin   Injectable 5000 Unit(s) SubCutaneous every 8 hours  insulin regular Infusion 3 Unit(s)/Hr IV Continuous <Continuous>  lidocaine   4% Patch 3 Patch Transdermal daily  methocarbamol 500 milliGRAM(s) Oral every 8 hours  methylPREDNISolone sodium succinate Injectable 36 milliGRAM(s) IV Push every 12 hours  methylPREDNISolone sodium succinate Injectable   IV Push   metoclopramide Injectable 10 milliGRAM(s) IV Push every 8 hours  milrinone Infusion 0.25 MICROgram(s)/kG/Min IV Continuous <Continuous>  mupirocin 2% Ointment 1 Application(s) Topical two times a day  mycophenolate mofetil IVPB 1000 milliGRAM(s) IV Intermittent <User Schedule>  naloxegol 25 milliGRAM(s) Oral daily  niCARdipine Infusion 3 mG/Hr IV Continuous <Continuous>  nystatin    Suspension 663876 Unit(s) Oral <User Schedule>  pantoprazole  Injectable 40 milliGRAM(s) IV Push daily  polyethylene glycol 3350 17 Gram(s) Oral daily  senna 2 Tablet(s) Oral at bedtime  simethicone 160 milliGRAM(s) Chew every 8 hours  sodium chloride 0.9%. 1000 milliLiter(s) IV Continuous <Continuous>  tacrolimus 0.5 milliGRAM(s) SubLingual <User Schedule>  tiotropium 2.5 MICROgram(s) Inhaler 2 Puff(s) Inhalation daily  traMADol 25 milliGRAM(s) Oral every 8 hours  vancomycin    Solution 125 milliGRAM(s) Oral every 12 hours      ICU Vital Signs Last 24 Hrs  T(C): 36.1 (27 Dec 2023 04:00), Max: 37.1 (26 Dec 2023 20:00)  T(F): 97 (27 Dec 2023 04:00), Max: 98.8 (26 Dec 2023 20:00)  HR: 89 (27 Dec 2023 07:) (88 - 93)  BP: --  BP(mean): --  ABP: 105/54 (27 Dec 2023 07:00) (88/46 - 146/68)  ABP(mean): 72 (27 Dec 2023 07:) (59 - 108)  RR: 13 (27 Dec 2023 07:) (10 - 55)  SpO2: 94% (27 Dec 2023 07:) (93% - 100%)    O2 Parameters below as of 27 Dec 2023 04:00  Patient On (Oxygen Delivery Method): nasal cannula, high flow  O2 Flow (L/min): 40  O2 Concentration (%): 40        Weight in k.8 (- @ 00:00)    I&O's Summary    26 Dec 2023 07:01  -  27 Dec 2023 07:00  --------------------------------------------------------  IN: 1699.3 mL / OUT: 3575 mL / NET: -1875.7 mL    27 Dec 2023 07:01  -  27 Dec 2023 07:57  --------------------------------------------------------  IN: 0 mL / OUT: 105 mL / NET: -105 mL        Physical Exam  General: No distress. Comfortable.  Neck: Neck supple. JVP not elevated. No masses  Chest: Clear to auscultation bilaterally  CV: Normal S1 and S2.   Abdomen: Soft, non-distended, non-tender  Extremities: warm and perfused  Neurology: Alert and oriented times three    Labs:                        8.0    9.90  )-----------( 133      ( 27 Dec 2023 00:23 )             25.0         137  |  102  |  33<H>  ----------------------------<  205<H>  3.9   |  22  |  1.34<H>    Ca    9.3      27 Dec 2023 00:23  Phos  4.9       Mg     2.1         TPro  5.9<L>  /  Alb  3.8  /  TBili  0.4  /  DBili  x   /  AST  48<H>  /  ALT  34  /  AlkPhos  42      PT/INR - ( 27 Dec 2023 00:23 )   PT: 13.3 sec;   INR: 1.28 ratio         PTT - ( 27 Dec 2023 00:23 )  PTT:26.9 sec        Oxygen Saturation, Mixed: 69.6 ( @ 03:56)  Oxygen Saturation, Mixed: 74.9 ( @ 00:00)  Oxygen Saturation, Mixed: 69.5 ( @ 19:58)  Oxygen Saturation, Mixed: 74.5 ( @ 18:45)  Oxygen Saturation, Mixed: 76.3 ( @ 16:50)  Oxygen Saturation, Mixed: 78.6 ( @ 14:10)  Oxygen Saturation, Mixed: 82.1 ( @ 11:55)  Oxygen Saturation, Mixed: 84.5 ( @ 08:05)  Oxygen Saturation, Mixed: 80.2 ( @ 00:00)  Oxygen Saturation, Mixed: 74.0 ( @ 19:57)  Oxygen Saturation, Mixed: 71.7 ( @ 15:40)  Oxygen Saturation, Mixed: 74.1 ( @ 14:35)  Oxygen Saturation, Mixed: 82.2 ( @ 09:18)

## 2023-12-27 NOTE — PROGRESS NOTE ADULT - SUBJECTIVE AND OBJECTIVE BOX
LD VALENCIA  58y Male  MRN:[id number]    Patient is a 58y old  Male who presents with a chief complaint of NSTEMI (01 Nov 2023 20:29)    HPI:  59yo M w/ hx HTN, CAD w/ 1 stent in 2009, ICH (2008) presenting with abn ekg. Patient presented to [facility] where he was found to have STEMI, recommended to get cath however patient did not want to get it there so it left and came here.  Patient initially had cough, congestion, fever, was placed on antibiotics on Sunday.  Started feeling nauseous and had a presyncopal event after which he presented to ED last night.  Had chest pain as well.  Chest pain is midsternal.  Not currently having chest pain.  Received 4 aspirin 30 min pta. (01 Nov 2023 15:11)      Patient seen and evaluated at bedside in CTU. interval events noted    Interval HPI: sitting in chair. worked with PT this am    PAST MEDICAL & SURGICAL HISTORY:  HTN (hypertension)      CAD (coronary artery disease)  2009; stent      Intracranial hemorrhage  2008      Respiratory arrest  december 1st      Myocardial infarction, unspecified MI type, unspecified artery      History of coronary artery stent placement          REVIEW OF SYSTEMS:  as per hpi     VITALS:   ICU Vital Signs Last 24 Hrs  T(C): 36.7 (27 Dec 2023 12:00), Max: 37.1 (26 Dec 2023 20:00)  T(F): 98 (27 Dec 2023 12:00), Max: 98.8 (26 Dec 2023 20:00)  HR: 89 (27 Dec 2023 12:30) (88 - 93)  BP: --  BP(mean): --  ABP: 111/61 (27 Dec 2023 12:30) (88/46 - 293/259)  ABP(mean): 80 (27 Dec 2023 12:30) (59 - 282)  RR: 14 (27 Dec 2023 12:30) (10 - 55)  SpO2: 94% (27 Dec 2023 12:30) (92% - 100%)    O2 Parameters below as of 27 Dec 2023 12:00  Patient On (Oxygen Delivery Method): nasal cannula, high flow  O2 Flow (L/min): 40  O2 Concentration (%): 40            PHYSICAL EXAM:  GENERAL: NAD, comfortable.    HEAD:  Atraumatic, Normocephalic  EYES: EOMI, PERRLA, conjunctiva and sclera clear  NECK:  No JVD. +central line   CHEST/LUNG: Clear to auscultation bilaterally; No wheeze +chest tubes  HEART: Regular rate and rhythm; No murmurs, rubs, or gallops  ABDOMEN: Soft, Nontender, Nondistended; Bowel sounds present  EXTREMITIES:  2+ Peripheral Pulses, No clubbing, cyanosis, or edema  NEUROLOGY: a0x3          Consultant(s) Notes Reviewed:  [x ] YES  [ ] NO  Care Discussed with Consultants/Other Providers [ x] YES  [ ] NO    MEDS:   MEDICATIONS  (STANDING):  budesonide  80 MICROgram(s)/formoterol 4.5 MICROgram(s) Inhaler 1 Puff(s) Inhalation two times a day  cefepime   IVPB 1000 milliGRAM(s) IV Intermittent every 8 hours  chlorhexidine 4% Liquid 1 Application(s) Topical <User Schedule>  dextrose 50% Injectable 50 milliLiter(s) IV Push every 15 minutes  gabapentin 100 milliGRAM(s) Oral every 8 hours  heparin   Injectable 5000 Unit(s) SubCutaneous every 8 hours  insulin regular Infusion 3 Unit(s)/Hr (3 mL/Hr) IV Continuous <Continuous>  lidocaine   4% Patch 3 Patch Transdermal daily  methocarbamol 500 milliGRAM(s) Oral every 8 hours  methylPREDNISolone sodium succinate Injectable 36 milliGRAM(s) IV Push every 12 hours  methylPREDNISolone sodium succinate Injectable   IV Push   metoclopramide Injectable 10 milliGRAM(s) IV Push every 8 hours  milrinone Infusion 0.25 MICROgram(s)/kG/Min (7.42 mL/Hr) IV Continuous <Continuous>  mupirocin 2% Ointment 1 Application(s) Topical two times a day  mycophenolate mofetil IVPB 1000 milliGRAM(s) IV Intermittent <User Schedule>  naloxegol 25 milliGRAM(s) Oral daily  niCARdipine Infusion 3 mG/Hr (15 mL/Hr) IV Continuous <Continuous>  nystatin    Suspension 241254 Unit(s) Oral <User Schedule>  pantoprazole  Injectable 40 milliGRAM(s) IV Push daily  polyethylene glycol 3350 17 Gram(s) Oral daily  senna 2 Tablet(s) Oral at bedtime  simethicone 160 milliGRAM(s) Chew every 8 hours  sodium chloride 0.9%. 1000 milliLiter(s) (10 mL/Hr) IV Continuous <Continuous>  tacrolimus 1 milliGRAM(s) Oral once  tacrolimus 0.5 milliGRAM(s) SubLingual <User Schedule>  traMADol 25 milliGRAM(s) Oral every 8 hours  vancomycin    Solution 125 milliGRAM(s) Oral every 12 hours    MEDICATIONS  (PRN):        Allergies    penicillins (Unknown)    Intolerances        LABS:                                         8.0    9.90  )-----------( 133      ( 27 Dec 2023 00:23 )             25.0   12-27    137  |  102  |  33<H>  ----------------------------<  205<H>  3.9   |  22  |  1.34<H>    Ca    9.3      27 Dec 2023 00:23  Phos  4.9     12-27  Mg     2.1     12-27    TPro  5.9<L>  /  Alb  3.8  /  TBili  0.4  /  DBili  x   /  AST  48<H>  /  ALT  34  /  AlkPhos  42  12-27        < from: CT Abdomen and Pelvis No Cont (11.28.23 @ 03:38) >  IMPRESSION:  Mildly dilated colon and prominent but not overly dilated small bowel   with air-fluid levels. No discrete transition point. Findings are   suggestive of ileus.    Intra-aortic balloon pump with the inferior marker at the level of the   inferior mesenteric artery and the balloon overlying the origins of the   renal arteries, celiac artery, and superior mesenteric artery. Consider   repositioning. Concern for IABP positioning was discussed with LANETTE Hawthorne   on 11/28/2023 at 3:42 AM by Dr. Shepard.    < end of copied text >          < from: Colonoscopy (11.17.23 @ 10:35) >                                                                                                        Impression:          - The entire examined colon is normal on direct and retroflexion views.                       - No specimens collected.  Recommendation:      - Resume previous diet today.                       - No large polyps or masses detected, No objection from GI standpoint to                 proceed with heart transplantation/advanced heart therapies.                       - Please call back should any further questions or concerns arise.    < end of copied text >     < from: Xray Chest 1 View- PORTABLE-Urgent (11.01.23 @ 07:42) >    IMPRESSION:  Clear lungs.    ---End of Report ---        < end of copied text >  < from: TTE W or WO Ultrasound Enhancing Agent (11.01.23 @ 10:23) >  _____________________________     CONCLUSIONS:      1. Left ventricular cavity is moderately dilated. Left ventricular wall thickness is normal. Left ventricular systolic function is severely decreased with an ejection fraction of 32 % by Chinchilla's method of disks. Regional wall motion abnormalities present.   2. Multiple segmental abnormalities exist. See findings.   3. There is moderate (grade 2) left ventricular diastolic dysfunction, with indeterminant filling pressure.   4. Normal right ventricular cavity size, wall thickness, and systolic function.   5. No significant valvular disease.   6. No pericardial effusion seen.   7. Compared to the transthoracic echocardiogram performed on 1/25/2017 the areas of akinesis are unchanged but there has been a decline in LV systolic function with new areas of hypokinesis.    __________________________________________________________________    < end of copied text >  < from: TTE Limited W or WO Ultrasound Enhancing Agent (11.02.23 @ 07:41) >  __________________________     CONCLUSIONS:      1. After obtaining consent, Definity ultrasound enhancing agent was given for enhanced left ventricular opacification and improved delineation of the left ventricular endocardial borders. Left ventricular systolic function is severely decreased with a calculated ejection fraction of 22 % by the Chinchilla's biplane method of disks. There is a left ventricular thrombus.   2. Findings were discussed with Litzy BOSS on 11/2/2023 at 8.49am.   3. There is a left ventricular thrombus.    _________________________________________________________________    < end of copied text >   LD VALENCIA  58y Male  MRN:[id number]    Patient is a 58y old  Male who presents with a chief complaint of NSTEMI (01 Nov 2023 20:29)    HPI:  59yo M w/ hx HTN, CAD w/ 1 stent in 2009, ICH (2008) presenting with abn ekg. Patient presented to [facility] where he was found to have STEMI, recommended to get cath however patient did not want to get it there so it left and came here.  Patient initially had cough, congestion, fever, was placed on antibiotics on Sunday.  Started feeling nauseous and had a presyncopal event after which he presented to ED last night.  Had chest pain as well.  Chest pain is midsternal.  Not currently having chest pain.  Received 4 aspirin 30 min pta. (01 Nov 2023 15:11)      Patient seen and evaluated at bedside in CTU. interval events noted    Interval HPI: sitting in chair. worked with PT this am    PAST MEDICAL & SURGICAL HISTORY:  HTN (hypertension)      CAD (coronary artery disease)  2009; stent      Intracranial hemorrhage  2008      Respiratory arrest  december 1st      Myocardial infarction, unspecified MI type, unspecified artery      History of coronary artery stent placement          REVIEW OF SYSTEMS:  as per hpi     VITALS:   ICU Vital Signs Last 24 Hrs  T(C): 36.7 (27 Dec 2023 12:00), Max: 37.1 (26 Dec 2023 20:00)  T(F): 98 (27 Dec 2023 12:00), Max: 98.8 (26 Dec 2023 20:00)  HR: 89 (27 Dec 2023 12:30) (88 - 93)  BP: --  BP(mean): --  ABP: 111/61 (27 Dec 2023 12:30) (88/46 - 293/259)  ABP(mean): 80 (27 Dec 2023 12:30) (59 - 282)  RR: 14 (27 Dec 2023 12:30) (10 - 55)  SpO2: 94% (27 Dec 2023 12:30) (92% - 100%)    O2 Parameters below as of 27 Dec 2023 12:00  Patient On (Oxygen Delivery Method): nasal cannula, high flow  O2 Flow (L/min): 40  O2 Concentration (%): 40            PHYSICAL EXAM:  GENERAL: NAD, comfortable.    HEAD:  Atraumatic, Normocephalic  EYES: EOMI, PERRLA, conjunctiva and sclera clear  NECK:  No JVD. +central line   CHEST/LUNG: Clear to auscultation bilaterally; No wheeze +chest tubes  HEART: Regular rate and rhythm; No murmurs, rubs, or gallops  ABDOMEN: Soft, Nontender, Nondistended; Bowel sounds present  EXTREMITIES:  2+ Peripheral Pulses, No clubbing, cyanosis, or edema  NEUROLOGY: a0x3          Consultant(s) Notes Reviewed:  [x ] YES  [ ] NO  Care Discussed with Consultants/Other Providers [ x] YES  [ ] NO    MEDS:   MEDICATIONS  (STANDING):  budesonide  80 MICROgram(s)/formoterol 4.5 MICROgram(s) Inhaler 1 Puff(s) Inhalation two times a day  cefepime   IVPB 1000 milliGRAM(s) IV Intermittent every 8 hours  chlorhexidine 4% Liquid 1 Application(s) Topical <User Schedule>  dextrose 50% Injectable 50 milliLiter(s) IV Push every 15 minutes  gabapentin 100 milliGRAM(s) Oral every 8 hours  heparin   Injectable 5000 Unit(s) SubCutaneous every 8 hours  insulin regular Infusion 3 Unit(s)/Hr (3 mL/Hr) IV Continuous <Continuous>  lidocaine   4% Patch 3 Patch Transdermal daily  methocarbamol 500 milliGRAM(s) Oral every 8 hours  methylPREDNISolone sodium succinate Injectable 36 milliGRAM(s) IV Push every 12 hours  methylPREDNISolone sodium succinate Injectable   IV Push   metoclopramide Injectable 10 milliGRAM(s) IV Push every 8 hours  milrinone Infusion 0.25 MICROgram(s)/kG/Min (7.42 mL/Hr) IV Continuous <Continuous>  mupirocin 2% Ointment 1 Application(s) Topical two times a day  mycophenolate mofetil IVPB 1000 milliGRAM(s) IV Intermittent <User Schedule>  naloxegol 25 milliGRAM(s) Oral daily  niCARdipine Infusion 3 mG/Hr (15 mL/Hr) IV Continuous <Continuous>  nystatin    Suspension 591528 Unit(s) Oral <User Schedule>  pantoprazole  Injectable 40 milliGRAM(s) IV Push daily  polyethylene glycol 3350 17 Gram(s) Oral daily  senna 2 Tablet(s) Oral at bedtime  simethicone 160 milliGRAM(s) Chew every 8 hours  sodium chloride 0.9%. 1000 milliLiter(s) (10 mL/Hr) IV Continuous <Continuous>  tacrolimus 1 milliGRAM(s) Oral once  tacrolimus 0.5 milliGRAM(s) SubLingual <User Schedule>  traMADol 25 milliGRAM(s) Oral every 8 hours  vancomycin    Solution 125 milliGRAM(s) Oral every 12 hours    MEDICATIONS  (PRN):        Allergies    penicillins (Unknown)    Intolerances        LABS:                                         8.0    9.90  )-----------( 133      ( 27 Dec 2023 00:23 )             25.0   12-27    137  |  102  |  33<H>  ----------------------------<  205<H>  3.9   |  22  |  1.34<H>    Ca    9.3      27 Dec 2023 00:23  Phos  4.9     12-27  Mg     2.1     12-27    TPro  5.9<L>  /  Alb  3.8  /  TBili  0.4  /  DBili  x   /  AST  48<H>  /  ALT  34  /  AlkPhos  42  12-27        < from: CT Abdomen and Pelvis No Cont (11.28.23 @ 03:38) >  IMPRESSION:  Mildly dilated colon and prominent but not overly dilated small bowel   with air-fluid levels. No discrete transition point. Findings are   suggestive of ileus.    Intra-aortic balloon pump with the inferior marker at the level of the   inferior mesenteric artery and the balloon overlying the origins of the   renal arteries, celiac artery, and superior mesenteric artery. Consider   repositioning. Concern for IABP positioning was discussed with LANETTE Hawthorne   on 11/28/2023 at 3:42 AM by Dr. Shepard.    < end of copied text >          < from: Colonoscopy (11.17.23 @ 10:35) >                                                                                                        Impression:          - The entire examined colon is normal on direct and retroflexion views.                       - No specimens collected.  Recommendation:      - Resume previous diet today.                       - No large polyps or masses detected, No objection from GI standpoint to                 proceed with heart transplantation/advanced heart therapies.                       - Please call back should any further questions or concerns arise.    < end of copied text >     < from: Xray Chest 1 View- PORTABLE-Urgent (11.01.23 @ 07:42) >    IMPRESSION:  Clear lungs.    ---End of Report ---        < end of copied text >  < from: TTE W or WO Ultrasound Enhancing Agent (11.01.23 @ 10:23) >  _____________________________     CONCLUSIONS:      1. Left ventricular cavity is moderately dilated. Left ventricular wall thickness is normal. Left ventricular systolic function is severely decreased with an ejection fraction of 32 % by Chinchilla's method of disks. Regional wall motion abnormalities present.   2. Multiple segmental abnormalities exist. See findings.   3. There is moderate (grade 2) left ventricular diastolic dysfunction, with indeterminant filling pressure.   4. Normal right ventricular cavity size, wall thickness, and systolic function.   5. No significant valvular disease.   6. No pericardial effusion seen.   7. Compared to the transthoracic echocardiogram performed on 1/25/2017 the areas of akinesis are unchanged but there has been a decline in LV systolic function with new areas of hypokinesis.    __________________________________________________________________    < end of copied text >  < from: TTE Limited W or WO Ultrasound Enhancing Agent (11.02.23 @ 07:41) >  __________________________     CONCLUSIONS:      1. After obtaining consent, Definity ultrasound enhancing agent was given for enhanced left ventricular opacification and improved delineation of the left ventricular endocardial borders. Left ventricular systolic function is severely decreased with a calculated ejection fraction of 22 % by the Chinchilla's biplane method of disks. There is a left ventricular thrombus.   2. Findings were discussed with Litzy BOSS on 11/2/2023 at 8.49am.   3. There is a left ventricular thrombus.    _________________________________________________________________    < end of copied text >

## 2023-12-27 NOTE — PROGRESS NOTE ADULT - SUBJECTIVE AND OBJECTIVE BOX
INFECTIOUS DISEASES FOLLOW UP-    This is a follow up note for this  58yMale with  Non-ST elevation myocardial infarction (NSTEMI)    s/p OHTx on 12/25 and IABP removal on 12/26  post op respiratory insufficiency, remains on High flow, weaned off Sabrina      ICU Vital Signs Last 24 Hrs  T(C): 36.5 (27 Dec 2023 08:00), Max: 37.1 (26 Dec 2023 20:00)  T(F): 97.7 (27 Dec 2023 08:00), Max: 98.8 (26 Dec 2023 20:00)  HR: 88 (27 Dec 2023 09:33) (88 - 93)  BP: --  BP(mean): --  ABP: 121/51 (27 Dec 2023 09:33) (88/46 - 146/68)  ABP(mean): 75 (27 Dec 2023 09:33) (59 - 108)  RR: 18 (27 Dec 2023 09:33) (10 - 55)  SpO2: 93% (27 Dec 2023 09:33) (93% - 100%)    O2 Parameters below as of 27 Dec 2023 09:33  Patient On (Oxygen Delivery Method): nasal cannula, high flow  O2 Flow (L/min): 40  O2 Concentration (%): 40    24 hours:    12-26 @ 07:01  -  12-27 @ 07:00  --------------------------------------------------------  OUT:    Chest Tube (mL): 45 mL    Chest Tube (mL): 230 mL    Chest Tube (mL): 20 mL    Chest Tube (mL): 5 mL    Indwelling Catheter - Urethral (mL): 3025 mL    Nasogastric/Oral tube (mL): 250 mL  Total OUT: 3575 mL        PHYSICAL EXAM:  Constitutional:no acute distress, sedated intubated  Eyes:MARVEL, EOMI  Ear/Nose/Throat: no oral lesions, right CVC/martin	  Respiratory: clear BL  chest tubes x4  Cardiovascular: S1S2 sternotomy dressing CDI  Gastrointestinal:soft, (+) BS, no tenderness  Extremities:no e/e/c  IABP femoral line remains in place  No Lymphadenopathy  IV sites not inflammed.  4 chest tubes with bloody fluid      ____________________________________________________  ROS  GENERAL: denies chills, , night sweats, weight loss.   PSYCH: denies depression, anxiety, suicidal ideation, hallucination, and delusions  SKIN: no rash or lesions; no color changes, no abnormal nevi,no  dryness, and nojaundice    EYES: denies visual changes, floaters, pain, inflammation, blurred vision, and discharge  ENT: denies tinnitus, vertigo, epistaxis, oral lesion, and decreased acuity  PULM: denies, hemoptysis, pleurisy  CVS: denies angina, palpitations,+ orthopnea, no syncope, or heart murmur  GI: denies constipation, diarrhea, melena, abdominal pain, nausea.   : denies dysuria, frequency, discharge, incontinence, stones or macroscopic hematuria  MS: no arthralgias, no erythema or swelling, no myalgias, noedema, or lower back pain.   CNS: denies numbness, dizziness, seizure, or tremor  ENDO: denies heat/cold intolerance, polyuria, polydipsia, malaise.    HEME: denies bruising, bleeding, lymphadenopathy, anemia, and calf pain    Allergies  penicillins (Unknown)    __________________________________________________  MEDS:  MEDICATIONS  (STANDING):  dexMEDEtomidine Infusion 0.5 <Continuous>  dextrose 50% Injectable 50 every 15 minutes  gabapentin 100 every 8 hours  heparin   Injectable 5000 every 8 hours  insulin regular Infusion 3 <Continuous>  methocarbamol 500 every 8 hours  methylPREDNISolone sodium succinate Injectable    methylPREDNISolone sodium succinate Injectable 40 every 12 hours  metoclopramide Injectable 10 every 8 hours  milrinone Infusion 0.25 <Continuous>  mycophenolate mofetil IVPB 1000 <User Schedule>  naloxegol 25 daily  niCARdipine Infusion 3 <Continuous>  pantoprazole  Injectable 40 daily  polyethylene glycol 3350 17 daily  senna 2 at bedtime  simethicone 160 every 8 hours  tacrolimus 0.5 <User Schedule>  tiotropium 2.5 MICROgram(s) Inhaler 2 daily  traMADol 25 every 8 hours    _________________________________________________  ANTIMICOBIALS  cefepime   IVPB 1000 every 8 hours  DAPTOmycin IVPB 500 <User Schedule>  mycophenolate mofetil IVPB 1000 <User Schedule>  nystatin    Suspension 849763 <User Schedule>  tacrolimus 0.5 <User Schedule>  vancomycin    Solution 125 every 12 hours      GENERAL LABS              8.8                  x    | x    | x            11.11 >-----------< 198     ------------------------< x                     26.6                 x    | x    | x                                            Ca x     Mg x     Ph x        Vancomycin Level, Random: <4.0 (12-08 @ 01:22)    Urinalysis Basic - ( 26 Dec 2023 00:31 )    Color: x / Appearance: x / SG: x / pH: x  Gluc: 117 mg/dL / Ketone: x  / Bili: x / Urobili: x   Blood: x / Protein: x / Nitrite: x   Leuk Esterase: x / RBC: x / WBC x   Sq Epi: x / Non Sq Epi: x / Bacteria: x        _________________________________________________  MICROBIOLOGY  -----------    Culture - Body Fluid with Gram Stain (collected 25 Dec 2023 15:28)  Source: .Body Fluid procurement fluid  Gram Stain (25 Dec 2023 21:08):    No polymorphonuclear cells seen    No organisms seen    by cytocentrifuge  Preliminary Report (26 Dec 2023 20:02):    No growth            Rapid RVP Result: Casandratec (12-24 @ 15:09)           INFECTIOUS DISEASES FOLLOW UP-    This is a follow up note for this  58yMale with  Non-ST elevation myocardial infarction (NSTEMI)    s/p OHTx on 12/25 and IABP removal on 12/26  post op respiratory insufficiency, remains on High flow, weaned off Sabrina      ICU Vital Signs Last 24 Hrs  T(C): 36.5 (27 Dec 2023 08:00), Max: 37.1 (26 Dec 2023 20:00)  T(F): 97.7 (27 Dec 2023 08:00), Max: 98.8 (26 Dec 2023 20:00)  HR: 88 (27 Dec 2023 09:33) (88 - 93)  BP: --  BP(mean): --  ABP: 121/51 (27 Dec 2023 09:33) (88/46 - 146/68)  ABP(mean): 75 (27 Dec 2023 09:33) (59 - 108)  RR: 18 (27 Dec 2023 09:33) (10 - 55)  SpO2: 93% (27 Dec 2023 09:33) (93% - 100%)    O2 Parameters below as of 27 Dec 2023 09:33  Patient On (Oxygen Delivery Method): nasal cannula, high flow  O2 Flow (L/min): 40  O2 Concentration (%): 40    24 hours:    12-26 @ 07:01  -  12-27 @ 07:00  --------------------------------------------------------  OUT:    Chest Tube (mL): 45 mL    Chest Tube (mL): 230 mL    Chest Tube (mL): 20 mL    Chest Tube (mL): 5 mL    Indwelling Catheter - Urethral (mL): 3025 mL    Nasogastric/Oral tube (mL): 250 mL  Total OUT: 3575 mL        PHYSICAL EXAM:  Constitutional:no acute distress, sedated intubated  Eyes:MARVEL, EOMI  Ear/Nose/Throat: no oral lesions, right CVC/martin	  Respiratory: clear BL  chest tubes x4  Cardiovascular: S1S2 sternotomy dressing CDI  Gastrointestinal:soft, (+) BS, no tenderness  Extremities:no e/e/c  IABP femoral line remains in place  No Lymphadenopathy  IV sites not inflammed.  4 chest tubes with bloody fluid      ____________________________________________________  ROS  GENERAL: denies chills, , night sweats, weight loss.   PSYCH: denies depression, anxiety, suicidal ideation, hallucination, and delusions  SKIN: no rash or lesions; no color changes, no abnormal nevi,no  dryness, and nojaundice    EYES: denies visual changes, floaters, pain, inflammation, blurred vision, and discharge  ENT: denies tinnitus, vertigo, epistaxis, oral lesion, and decreased acuity  PULM: denies, hemoptysis, pleurisy  CVS: denies angina, palpitations,+ orthopnea, no syncope, or heart murmur  GI: denies constipation, diarrhea, melena, abdominal pain, nausea.   : denies dysuria, frequency, discharge, incontinence, stones or macroscopic hematuria  MS: no arthralgias, no erythema or swelling, no myalgias, noedema, or lower back pain.   CNS: denies numbness, dizziness, seizure, or tremor  ENDO: denies heat/cold intolerance, polyuria, polydipsia, malaise.    HEME: denies bruising, bleeding, lymphadenopathy, anemia, and calf pain    Allergies  penicillins (Unknown)    __________________________________________________  MEDS:  MEDICATIONS  (STANDING):  dexMEDEtomidine Infusion 0.5 <Continuous>  dextrose 50% Injectable 50 every 15 minutes  gabapentin 100 every 8 hours  heparin   Injectable 5000 every 8 hours  insulin regular Infusion 3 <Continuous>  methocarbamol 500 every 8 hours  methylPREDNISolone sodium succinate Injectable    methylPREDNISolone sodium succinate Injectable 40 every 12 hours  metoclopramide Injectable 10 every 8 hours  milrinone Infusion 0.25 <Continuous>  mycophenolate mofetil IVPB 1000 <User Schedule>  naloxegol 25 daily  niCARdipine Infusion 3 <Continuous>  pantoprazole  Injectable 40 daily  polyethylene glycol 3350 17 daily  senna 2 at bedtime  simethicone 160 every 8 hours  tacrolimus 0.5 <User Schedule>  tiotropium 2.5 MICROgram(s) Inhaler 2 daily  traMADol 25 every 8 hours    _________________________________________________  ANTIMICOBIALS  cefepime   IVPB 1000 every 8 hours  DAPTOmycin IVPB 500 <User Schedule>  mycophenolate mofetil IVPB 1000 <User Schedule>  nystatin    Suspension 217081 <User Schedule>  tacrolimus 0.5 <User Schedule>  vancomycin    Solution 125 every 12 hours      GENERAL LABS              8.8                  x    | x    | x            11.11 >-----------< 198     ------------------------< x                     26.6                 x    | x    | x                                            Ca x     Mg x     Ph x        Vancomycin Level, Random: <4.0 (12-08 @ 01:22)    Urinalysis Basic - ( 26 Dec 2023 00:31 )    Color: x / Appearance: x / SG: x / pH: x  Gluc: 117 mg/dL / Ketone: x  / Bili: x / Urobili: x   Blood: x / Protein: x / Nitrite: x   Leuk Esterase: x / RBC: x / WBC x   Sq Epi: x / Non Sq Epi: x / Bacteria: x        _________________________________________________  MICROBIOLOGY  -----------    Culture - Body Fluid with Gram Stain (collected 25 Dec 2023 15:28)  Source: .Body Fluid procurement fluid  Gram Stain (25 Dec 2023 21:08):    No polymorphonuclear cells seen    No organisms seen    by cytocentrifuge  Preliminary Report (26 Dec 2023 20:02):    No growth            Rapid RVP Result: Casandratec (12-24 @ 15:09)

## 2023-12-27 NOTE — PROGRESS NOTE ADULT - ASSESSMENT
57 yo M with PMHx of HTN, CAD w/ 1 stent in 2009, ICH (2008) presented on 11/1 with abn ekg. Patient presented to [facility] where he was found to have STEMI, recommended to get cath however patient did not want to get it there so he left and came here.   EKG here with ST depression in lateral leads and elevation in anterior leads   Prior to C found to be tachycardic, dyspneic, intubated   [facility] 11/1 with chronic total occlusion of LAD and RCA, with elevated RA and PA pressures  TTE 11/1 with severely decreased EF 32%, s/p IABP 11/1        #s/p OHT 12/25/23 CMV D-/R+; Toxo D-/R-  Induction simulect and MPN  Maintenance tacro/MMF  ·  Plan: - CMV -/+: intermediate risk.  Will need to start on valganciclovir when able X 6 months.    - Toxo -/-: none required  - PJP ppx: will introduce eventually   - Trush ppx: eventual Nystatin.  - donor HCV TIM( -) but HCV antibody +.  - on daptomycin ppx for hx of rash to vancomycin  - donor sputum + for staph but with would not influence risk of infection in heart recipient    #pretransplant E faecalis bacteremia in c/o colonoscopy  completed therapy    #Rash- to vancomycin?  resolved    # hep B and Hep A not immune  received vaccine series but second dose given early  will need repeat ab titers    # Bacteremia.   ·  Plan: BCx from 11/17 with GPC in pair/chains in both bottles; Mixed cultures Staph epi and Enterococcus faecalis . Repeat cultures from 11/18; NGTD  + rods in blood culture on 11/30 (reported on 12/4= cutibacterium), repeat BCxs Negative Completed abx course.   receiving Daptomycin perioperative prophylaxis      Recommendations  ·	Complete perioperative ppx cefepime and daptomycin for 48 hours (the latter already discontinued)  ·	Donor with sputum staphylococcus- in the absence of donor bacteremia would not affect heart recipient outcomes and would not need further prophylaxis for this  ·	Valcyte and bactrim ppx as soon as able        Thank you for involving us in the care of this patient  Transplant ID will continue to follow  Please call or page with additional questions  Pager; #[phone]  Teams: from 8 am to 5 pm  Brandi Silva MD     59 yo M with PMHx of HTN, CAD w/ 1 stent in 2009, ICH (2008) presented on 11/1 with abn ekg. Patient presented to [facility] where he was found to have STEMI, recommended to get cath however patient did not want to get it there so he left and came here.   EKG here with ST depression in lateral leads and elevation in anterior leads   Prior to C found to be tachycardic, dyspneic, intubated   [facility] 11/1 with chronic total occlusion of LAD and RCA, with elevated RA and PA pressures  TTE 11/1 with severely decreased EF 32%, s/p IABP 11/1        #s/p OHT 12/25/23 CMV D-/R+; Toxo D-/R-  Induction simulect and MPN  Maintenance tacro/MMF  ·  Plan: - CMV -/+: intermediate risk.  Will need to start on valganciclovir when able X 6 months.    - Toxo -/-: none required  - PJP ppx: will introduce eventually   - Trush ppx: eventual Nystatin.  - donor HCV TIM( -) but HCV antibody +.  - on daptomycin ppx for hx of rash to vancomycin  - donor sputum + for staph but with would not influence risk of infection in heart recipient    #pretransplant E faecalis bacteremia in c/o colonoscopy  completed therapy    #Rash- to vancomycin?  resolved    # hep B and Hep A not immune  received vaccine series but second dose given early  will need repeat ab titers    # Bacteremia.   ·  Plan: BCx from 11/17 with GPC in pair/chains in both bottles; Mixed cultures Staph epi and Enterococcus faecalis . Repeat cultures from 11/18; NGTD  + rods in blood culture on 11/30 (reported on 12/4= cutibacterium), repeat BCxs Negative Completed abx course.   receiving Daptomycin perioperative prophylaxis      Recommendations  ·	Complete perioperative ppx cefepime and daptomycin for 48 hours (the latter already discontinued)  ·	Donor with sputum staphylococcus- in the absence of donor bacteremia would not affect heart recipient outcomes and would not need further prophylaxis for this  ·	Valcyte and bactrim ppx as soon as able        Thank you for involving us in the care of this patient  Transplant ID will continue to follow  Please call or page with additional questions  Pager; #[phone]  Teams: from 8 am to 5 pm  Brandi Silva MD

## 2023-12-27 NOTE — PHYSICAL THERAPY INITIAL EVALUATION ADULT - PERTINENT HX OF CURRENT PROBLEM, REHAB EVAL
59yo M w/ hx HTN, CAD w/ 1 stent in 2009, ICH (2008) presenting with abn ekg. Patient presented to [facility] where he was found to have STEMI, recommended to get cath however patient did not want to get it there so it left and came here.  Patient initially had cough, congestion, fever, was placed on antibiotics on Sunday.  Started feeling nauseous and had a presyncopal event after which he presented to ED last night. Had chest pain as well. Found to have in-stent restenosis of pLAD and  of RCA with elevated RA and PA pressures and severely decreased EF 32%, admitted to CICU for management of cardiogenic shock requiring IABP 11/1 -11/7, with hospital course c/b vfib arrest.   -11/8 VF arrest 11/8 after being downgraded   - IABP now replaced on 11/9 to limit ectopy and dec myocardial work  Pt now s/p OHT 12/24/23 57yo M w/ hx HTN, CAD w/ 1 stent in 2009, ICH (2008) presenting with abn ekg. Patient presented to [facility] where he was found to have STEMI, recommended to get cath however patient did not want to get it there so it left and came here.  Patient initially had cough, congestion, fever, was placed on antibiotics on Sunday.  Started feeling nauseous and had a presyncopal event after which he presented to ED last night. Had chest pain as well. Found to have in-stent restenosis of pLAD and  of RCA with elevated RA and PA pressures and severely decreased EF 32%, admitted to CICU for management of cardiogenic shock requiring IABP 11/1 -11/7, with hospital course c/b vfib arrest.   -11/8 VF arrest 11/8 after being downgraded   - IABP now replaced on 11/9 to limit ectopy and dec myocardial work  Pt now s/p OHT 12/24/23

## 2023-12-27 NOTE — PROGRESS NOTE ADULT - SUBJECTIVE AND OBJECTIVE BOX
Behavioral Cardiology Progress Note     History of Present Illness: Mr. Hardy is a 58 year-old man with history of HTN, CAD (1 stent in 2009), ICH (2008) presented on 11/1 with abnormal EKG. Patient presented to [facility] where he was found to have STEMI, recommended to get cath however patient did not want treatment at OSH so left and came to [facility]. EKG here with ST depression in lateral leads and elevation in anterior leads. Prior to C found to be tachycardic, dyspneic, intubated. LHC 11/1 with chronic total occlusion of LAD and RCA, with elevated RA and PA pressures. TTE 11/1 with severely decreased EF 32%, s/p IABP 11/1.     Social history: Mr. Martin Hardy is a 58-year-old [place] American,  male with three children, including two sons and three daughters. 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 His wife, Brynn, works as an  in a public high school in [place]. Mr. Hardy reported three close friendships with men who live in [place]. Notably, one of these men, Dr. Brody Le, is Mr. Hardy’s healthcare proxy and PCP. Mr. Hardy also reported that these friends feel like family due to their closeness. Currently, Mr. Hardy lives in a condominium apartment in [place] with his wife and children. 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 Mr. Hardy noted his parents live in [place] as does one brother and one sister. Mr. Hardy has another sister, diagnosed with bipolar disorder, who lives in [place]. He described feeling distant from his siblings, as they lack a close relationship.      Substance use:    •	Tobacco: Denies current and past tobacco use.    •	Alcohol: Denies current and past alcohol use.   •	Drug: Denies current and past drug use.      Past psychiatric history: Mr. Hardy denied any history of self-harm and suicidal ideation, plan, or attempt. He also denied any history of violent thoughts or behaviors. Denies history of outpatient treatment with a therapist or psychiatrist. With this said, he reported a history of odd thinking related, health-related anxiety. For example, Mr. Hardy reported meeting with a physician about 12 years ago because he feared having a brain tumor. He believed that the presence of a brain tumor could explain his superior memory. Earlier this year, he revealed anxiety to his PCP, who prescribed Mr. Hardy Lorazepam (0.5mg PRN). Per medical chart, Mr. Hardy filled his prescription three separate times in the past year. He reported concern about dependency, given Lorazepam’s status as a controlled medication, and desire to avoid substance abuse.      Psychological assessment: Mr. Hardy reported mood was "good." He described relief following the surgery and desire "to take each day as it comes." He was future-oriented in his thinking style, and he reported comfort knowing that he was surrounded by a medical team. He acknowledged that he will have to adjust to living without a team, following discharge. He also reported his family plans to visit today, and he is looking forward to their arrival.    Mental Status Exam: Seen lying in bed on CTU. Awake, alert. Oriented x4. Well related. Maintained good eye contact. Speech was normal volume, rate, tone. Mood euthymic. Affect full range. Thought process goal directed, content focused on current health status. Denies s/i. Insight into disease good. Immediate judgement good.        Dx: Adjustment Disorder with anxiety    No absolute psychological contraindications for pursuing heart transplant/LVAD with following recommendations:     Psychology will continue to follow for supportive therapy and CBT strategies to manage anxiety.  Seen by psychiatry (11/8), will benefit from follow up.   Maintain ongoing psychiatric follow-up.   Holistic Nurse.   Pet therapy if appropriate.   LVAD and heart transplant support group information provided.       20 minutes spent on total patient encounter       Behavioral Cardiology Progress Note     History of Present Illness: Mr. Hardy is a 58 year-old man with history of HTN, CAD (1 stent in 2009), ICH (2008) presented on 11/1 with abnormal EKG. Patient presented to [facility] where he was found to have STEMI, recommended to get cath however patient did not want treatment at OSH so left and came to [facility]. EKG here with ST depression in lateral leads and elevation in anterior leads. Prior to C found to be tachycardic, dyspneic, intubated. LHC 11/1 with chronic total occlusion of LAD and RCA, with elevated RA and PA pressures. TTE 11/1 with severely decreased EF 32%, s/p IABP 11/1.     Social history: Mr. Martin Hardy is a 58-year-old [place] American,  male with three children, including two sons and three daughters. 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 His wife, Brynn, works as an  in a public high school in [place] [facility]. Mr. Hardy reported three close friendships with men who live in [place]. Notably, one of these men, Dr. Brody Le, is Mr. Hardy’s healthcare proxy and PCP. Mr. Hardy also reported that these friends feel like family due to their closeness. Currently, Mr. Hardy lives in a condominium apartment in [place] [facility] with his wife and children. 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 Mr. Hardy noted his parents live in [place] as does one brother and one sister. Mr. Hardy has another sister, diagnosed with bipolar disorder, who lives in [place]. He described feeling distant from his siblings, as they lack a close relationship.      Substance use:    •	Tobacco: Denies current and past tobacco use.    •	Alcohol: Denies current and past alcohol use.   •	Drug: Denies current and past drug use.      Past psychiatric history: Mr. Hardy denied any history of self-harm and suicidal ideation, plan, or attempt. He also denied any history of violent thoughts or behaviors. Denies history of outpatient treatment with a therapist or psychiatrist. With this said, he reported a history of odd thinking related, health-related anxiety. For example, Mr. Hardy reported meeting with a physician about 12 years ago because he feared having a brain tumor. He believed that the presence of a brain tumor could explain his superior memory. Earlier this year, he revealed anxiety to his PCP, who prescribed Mr. Hardy Lorazepam (0.5mg PRN). Per medical chart, Mr. Hardy filled his prescription three separate times in the past year. He reported concern about dependency, given Lorazepam’s status as a controlled medication, and desire to avoid substance abuse.      Psychological assessment: Mr. Hardy reported mood was "good." He described relief following the surgery and desire "to take each day as it comes." He was future-oriented in his thinking style, and he reported comfort knowing that he was surrounded by a medical team. He acknowledged that he will have to adjust to living without a team, following discharge. He also reported his family plans to visit today, and he is looking forward to their arrival.    Mental Status Exam: Seen lying in bed on CTU. Awake, alert. Oriented x4. Well related. Maintained good eye contact. Speech was normal volume, rate, tone. Mood euthymic. Affect full range. Thought process goal directed, content focused on current health status. Denies s/i. Insight into disease good. Immediate judgement good.        Dx: Adjustment Disorder with anxiety    No absolute psychological contraindications for pursuing heart transplant/LVAD with following recommendations:     Psychology will continue to follow for supportive therapy and CBT strategies to manage anxiety.  Seen by psychiatry (11/8), will benefit from follow up.   Maintain ongoing psychiatric follow-up.   Holistic Nurse.   Pet therapy if appropriate.   LVAD and heart transplant support group information provided.       20 minutes spent on total patient encounter       Behavioral Cardiology Progress Note     History of Present Illness: Mr. Hardy is a 58 year-old man with history of HTN, CAD (1 stent in 2009), ICH (2008) presented on 11/1 with abnormal EKG. Patient presented to [facility] where he was found to have STEMI, recommended to get cath however patient did not want treatment at OSH so left and came to [facility]. EKG here with ST depression in lateral leads and elevation in anterior leads. Prior to C found to be tachycardic, dyspneic, intubated. LHC 11/1 with chronic total occlusion of LAD and RCA, with elevated RA and PA pressures. TTE 11/1 with severely decreased EF 32%, s/p IABP 11/1.     Social history: Mr. Martin Hardy is a 58-year-old [place] American,  male with three children, including two sons and three daughters. 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 His wife, Brynn, works as an  in a public high school in [place]. Mr. Hardy reported three close friendships with men who live in [place]. Notably, one of these men, Dr. Brody Le, is Mr. Hardy’s healthcare proxy and PCP. Mr. Hardy also reported that these friends feel like family due to their closeness. Currently, Mr. Hardy lives in a condominium apartment in [place] with his wife and children. 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 Mr. Hardy noted his parents live in [place] as does one brother and one sister. Mr. Hardy has another sister, diagnosed with bipolar disorder, who lives in [place]. He described feeling distant from his siblings, as they lack a close relationship.      Substance use:    •	Tobacco: Denies current and past tobacco use.    •	Alcohol: Denies current and past alcohol use.   •	Drug: Denies current and past drug use.      Past psychiatric history: Mr. Hardy denied any history of self-harm and suicidal ideation, plan, or attempt. He also denied any history of violent thoughts or behaviors. Denies history of outpatient treatment with a therapist or psychiatrist. With this said, he reported a history of odd thinking related, health-related anxiety. For example, Mr. Hardy reported meeting with a physician about 12 years ago because he feared having a brain tumor. He believed that the presence of a brain tumor could explain his superior memory. Earlier this year, he revealed anxiety to his PCP, who prescribed Mr. Hardy Lorazepam (0.5mg PRN). Per medical chart, Mr. Hardy filled his prescription three separate times in the past year. He reported concern about dependency, given Lorazepam’s status as a controlled medication, and desire to avoid substance abuse.      Psychological assessment: Mr. Hardy reported mood was "good." He described relief following the surgery and desire "to take each day as it comes." He was future-oriented in his thinking style, and reported comfort knowing that he was surrounded by a medical team. He acknowledged that he will have to adjust to living without a team, following discharge. He also reported his family plans to visit today, and he is looking forward to their arrival.    Mental Status Exam: Seen lying in bed on CTU. Awake, alert. Oriented x4. Well related. Maintained good eye contact. Speech was normal volume, rate, tone. Mood euthymic. Affect full range. Thought process goal directed, content focused on current health status. Denies s/i. Insight into disease good. Immediate judgement good.        Dx: Adjustment Disorder with anxiety    Recommendations:     ·	Psychology will continue to follow for supportive therapy and CBT strategies to manage anxiety.  ·	Seen by psychiatry (11/8), will benefit from follow up.   ·	Holistic Nurse.   ·	LVAD and heart transplant support group information provided.       20 minutes spent on total patient encounter      Remy Jacobs MA  Psychology Extern     I have made amendments to the documentation where necessary.   Karla Villalobos, PhD   Supervising Psychologist    Behavioral Cardiology Progress Note     History of Present Illness: Mr. Hardy is a 58 year-old man with history of HTN, CAD (1 stent in 2009), ICH (2008) presented on 11/1 with abnormal EKG. Patient presented to [facility] where he was found to have STEMI, recommended to get cath however patient did not want treatment at OSH so left and came to [facility]. EKG here with ST depression in lateral leads and elevation in anterior leads. Prior to C found to be tachycardic, dyspneic, intubated. LHC 11/1 with chronic total occlusion of LAD and RCA, with elevated RA and PA pressures. TTE 11/1 with severely decreased EF 32%, s/p IABP 11/1.     Social history: Mr. Martin Hardy is a 58-year-old [place] American,  male with three children, including two sons and three daughters. 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 His wife, Brynn, works as an  in a public high school in [place]. Mr. Hardy reported three close friendships with men who live in [place]. Notably, one of these men, Dr. Brody Le, is Mr. Hardy’s healthcare proxy and PCP. Mr. Hardy also reported that these friends feel like family due to their closeness. Currently, Mr. Hardy lives in a condominium apartment in [place] with his wife and children. 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 Mr. Hardy noted his parents live in [place] as does one brother and one sister. Mr. Hardy has another sister, diagnosed with bipolar disorder, who lives in [place]. He described feeling distant from his siblings, as they lack a close relationship.      Substance use:    •	Tobacco: Denies current and past tobacco use.    •	Alcohol: Denies current and past alcohol use.   •	Drug: Denies current and past drug use.      Past psychiatric history: Mr. Hardy denied any history of self-harm and suicidal ideation, plan, or attempt. He also denied any history of violent thoughts or behaviors. Denies history of outpatient treatment with a therapist or psychiatrist. With this said, he reported a history of odd thinking related, health-related anxiety. For example, Mr. Hardy reported meeting with a physician about 12 years ago because he feared having a brain tumor. He believed that the presence of a brain tumor could explain his superior memory. Earlier this year, he revealed anxiety to his PCP, who prescribed Mr. Hardy Lorazepam (0.5mg PRN). Per medical chart, Mr. Hardy filled his prescription three separate times in the past year. He reported concern about dependency, given Lorazepam’s status as a controlled medication, and desire to avoid substance abuse.      Psychological assessment: Mr. Hardy reported mood was "good." He described relief following the surgery and desire "to take each day as it comes." He was future-oriented in his thinking style, and reported comfort knowing that he was surrounded by a medical team. He acknowledged that he will have to adjust to living without a team, following discharge. He also reported his family plans to visit today, and he is looking forward to their arrival.    Mental Status Exam: Seen lying in bed on CTU. Awake, alert. Oriented x4. Well related. Maintained good eye contact. Speech was normal volume, rate, tone. Mood euthymic. Affect full range. Thought process goal directed, content focused on current health status. Denies s/i. Insight into disease good. Immediate judgement good.        Dx: Adjustment Disorder with anxiety    Recommendations:     ·	Psychology will continue to follow for supportive therapy and CBT strategies to manage anxiety.  ·	Seen by psychiatry (11/8), will benefit from follow up.   ·	Holistic Nurse.   ·	LVAD and heart transplant support group information provided.       20 minutes spent on total patient encounter      Remy Jacobs MA  Psychology Extern     I have made amendments to the documentation where necessary.   Karla Villalobos, PhD   Supervising Psychologist

## 2023-12-27 NOTE — PROGRESS NOTE ADULT - ASSESSMENT
59 yo male h/o htn, cad s/p pci, ICH, here with NSTEMI  s/p intubation and cath. now in CCU    NSTEMI  s/p  cath  cath results noted. multi vessel dz., CTSx f/u.    pt with vtach/fib arrest again on 11/8. s/p ACLS and ROSC.   in ccu with iabp   plan for transplant as per HF and transplant team  transplant w/u ongoing.   s/p colonoscopy 11/17. normal  now listed for transplant as of 11/22 12/25: pt s/p heart transplant last night. remains intubated with iabp in CTU.   12/26: pt extubated to high flow this am. IABP currently being removed. pt a0x3.   12/27: pt feels well. walked with PT this am. currently comforable sitting in chair. IABP removed. wean off pressors as able.   mngt as per CTU team          Advanced care planning was discussed with patient and family.  Advanced care planning forms were reviewed and discussed as appropriate.  Differential diagnosis and plan of care discussed with patient after the evaluation.   Pain assessed and judicious use of narcotics when appropriate was discussed.  Importance of Fall prevention discussed.  Counseling on Smoking and Alcohol cessation was offered when appropriate.  Counseling on Diet, exercise, and medication compliance was done.       Approx 75 minutes spent.

## 2023-12-27 NOTE — PROGRESS NOTE ADULT - PROBLEM SELECTOR PLAN 3
- CMV -/+: intermediate risk.  Plan to start valganciclovir when able X 6 months.    - Toxo -/-: none required  - PJP ppx: plan to start bactrim or mepron based off renal function   - Trush ppx: eventual Nystatin.  - donor HCV TIM - but HCV antibody +.  - on daptomycin ppx for hx of rash to vancomycin and donor sputum + for staph. - CMV -/+: intermediate risk.  Plan to start Valcyte within the upcoming days  - Toxo -/-: none required  - PJP ppx: plan to start bactrim or mepron within the upcoming days, will be based off renal function   - Trush ppx: continue Nystatin S/S q6 hours   - donor HCV TIM - but HCV antibody +.  - on daptomycin ppx for hx of rash to vancomycin and donor sputum + for staph.

## 2023-12-27 NOTE — PHYSICAL THERAPY INITIAL EVALUATION ADULT - PLANNED THERAPY INTERVENTIONS, PT EVAL
1. LtG Pt will be indep to neg 10 step using HR w/in 4 wks/bed mobility training/gait training/transfer training
bed mobility training/gait training/transfer training

## 2023-12-27 NOTE — PHYSICAL THERAPY INITIAL EVALUATION ADULT - ADDITIONAL COMMENTS
Patient reports he lives with his spouse and children in a private house. He has 4 steps to enter and 6 steps inside. He was fully independent prior.
Patient reports he lives with his spouse and children in a private house. He has 4 steps to enter and 6 steps inside. He was fully independent prior.

## 2023-12-27 NOTE — ED ADULT TRIAGE NOTE - BP NONINVASIVE DIASTOLIC (MM HG)
Covid, strep and flu tests negative.    Continue cough and cold medications  Tylenol for pain/fever I have reviewed and agree to the content of the note written by the PTA.   Electronically signed by Aida Goncalves PT [id number]

## 2023-12-27 NOTE — PROGRESS NOTE ADULT - ASSESSMENT
57 yo M with HFrEF (LVIDd 6.4 cm, LVEF 32%), CAD s/p PCI (2008), HTN, DMT2 (A1c 8.3%) and CVA s/p TPA (2018), recently treated for PNA who initially presented to [facility] via EMS after syncope reportedly requiring defibrillation. Treated for ACS but left AMA to come to [facility]. On arrival ECG with ST depression in lateral leads. Intubated 11/1 for respiratory failure and was found to have COVID. L/RHC 11/1revealing  of LAD and RCA, elevated filling pressures and CI of 1.5 prompting placement of IABP. Extubated 11/3. IABP was weaned to off 11/7, however the following day developed PMVT cardiac arrest with prompt CPR and defibrillation, started on IV Lidocaine/Amio and IABP ultimately replaced on 11/9. During this admission was found to be bacteremic for which she was treated for Staph epi, Enterococcus faecalis, Cutibacterium with IV abx.  Was listed Status 2, ABO A, PRA 0% (12/12).     A suitable donor was identified and patient underwent OHT 12/25 (ischemic Time: 253min, CMV -/+, Toxo -/-) and extubated the following day. He overall is progressing well. At this time remains on Epi and Primacor, which will be weaned as tolerated. Will undergo RHC/EMBx on Tuesday 1/2.  59 yo M with HFrEF (LVIDd 6.4 cm, LVEF 32%), CAD s/p PCI (2008), HTN, DMT2 (A1c 8.3%) and CVA s/p TPA (2018), recently treated for PNA who initially presented to [facility] via EMS after syncope reportedly requiring defibrillation. Treated for ACS but left AMA to come to [facility]. On arrival ECG with ST depression in lateral leads. Intubated 11/1 for respiratory failure and was found to have COVID. L/RHC 11/1revealing  of LAD and RCA, elevated filling pressures and CI of 1.5 prompting placement of IABP. Extubated 11/3. IABP was weaned to off 11/7, however the following day developed PMVT cardiac arrest with prompt CPR and defibrillation, started on IV Lidocaine/Amio and IABP ultimately replaced on 11/9. During this admission was found to be bacteremic for which she was treated for Staph epi, Enterococcus faecalis, Cutibacterium with IV abx.  Was listed Status 2, ABO A, PRA 0% (12/12).     A suitable donor was identified and patient underwent OHT 12/25 (ischemic Time: 253min, CMV -/+, Toxo -/-) and extubated the following day. He overall is progressing well. At this time remains on Epi and Primacor, which will be weaned as tolerated. Will undergo RHC/EMBx on Tuesday 1/2.  59 yo M with HFrEF (LVIDd 6.4 cm, LVEF 32%), CAD s/p PCI (2008), HTN, DMT2 (A1c 8.3%) and CVA s/p TPA (2018), recently treated for PNA who initially presented to [facility] via EMS after syncope reportedly requiring defibrillation. Treated for ACS but left AMA to come to [facility]. On arrival ECG with ST depression in lateral leads. Intubated 11/1 for respiratory failure and was found to have COVID. L/RHC 11/1revealing  of LAD and RCA, elevated filling pressures and CI of 1.5 prompting placement of IABP. Extubated 11/3. IABP was weaned to off 11/7, however the following day developed PMVT cardiac arrest with prompt CPR and defibrillation, started on IV Lidocaine/Amio and IABP ultimately replaced on 11/9. During this admission was found to be bacteremic for which she was treated for Staph epi, Enterococcus faecalis, Cutibacterium with IV abx.  Was listed Status 2, ABO A, PRA 0% (12/12).     A suitable donor was identified and patient underwent OHT 12/25 (ischemic Time: 253min, CMV -/+, Toxo -/-), he was extubated and IABP was removed the following day. He overall is progressing well. At this time remains on  Primacor, which will be weaned as tolerated. Will undergo RHC/EMBx on Tuesday 1/2.  57 yo M with HFrEF (LVIDd 6.4 cm, LVEF 32%), CAD s/p PCI (2008), HTN, DMT2 (A1c 8.3%) and CVA s/p TPA (2018), recently treated for PNA who initially presented to [facility] via EMS after syncope reportedly requiring defibrillation. Treated for ACS but left AMA to come to [facility]. On arrival ECG with ST depression in lateral leads. Intubated 11/1 for respiratory failure and was found to have COVID. L/RHC 11/1revealing  of LAD and RCA, elevated filling pressures and CI of 1.5 prompting placement of IABP. Extubated 11/3. IABP was weaned to off 11/7, however the following day developed PMVT cardiac arrest with prompt CPR and defibrillation, started on IV Lidocaine/Amio and IABP ultimately replaced on 11/9. During this admission was found to be bacteremic for which she was treated for Staph epi, Enterococcus faecalis, Cutibacterium with IV abx.  Was listed Status 2, ABO A, PRA 0% (12/12).     A suitable donor was identified and patient underwent OHT 12/25 (ischemic Time: 253min, CMV -/+, Toxo -/-), he was extubated and IABP was removed the following day. He overall is progressing well. At this time remains on  Primacor, which will be weaned as tolerated. Will undergo RHC/EMBx on Tuesday 1/2.

## 2023-12-27 NOTE — PROGRESS NOTE ADULT - ASSESSMENT
57yo M w/ PMHx HTN, CAD s/p stent (2009), ICH (2008), ICM now s/p heart transplant on 12/25/2023. Endocrine consulted for T2DM management.     #T2DM (A1c  8.3%)  Home meds: unclear, per med rec patient on dapagliflozin 10mg daily, ozempic 1mg weekly, however daughter thinks ozempic was discontinued ? Need proper med rec after patient is extubated or if can contact PCP  - c/w insulin drip for now as patient not eating yet and on high doses of steroids  - will reassess insulin requirements daily   - please notify us if your team desires to transition patient from insulin drip to basal-bolus  - Steroids: s/p solumed 500mg on 12/25/23 now on solumed taper  - DISCHARGE RECOMMENDATIONS: depending on steroid regimen on discharge and isnulin requirements basal-bolus regimen, TBD.  - OUT-PATIENT FOLLOW UP: Patient should follow up with PCP vs Endocrinology depending on insulin requirement (pt does not have endocrinolgist)    #HLD  - patient not on statin  - goal LDL in diabetes mellitus is <70    #HTN  - patient on losartan 25mg daily at home  - goal BP in diabetes mellitus is <130/80  - defer to primary team for management      discussed with patient and primary team   Contact via Microsoft Teams during business hours  To reach covering provider access AMION via sunrise tools  For Urgent matters/after-hours/weekends/holidays please page endocrine fellow on call   For nonurgent matters please email [email or web address]    Please note that this patient may be followed by different provider tomorrow.  Notify endocrine 24 hours prior to discharge for final recommendations    59yo M w/ PMHx HTN, CAD s/p stent (2009), ICH (2008), ICM now s/p heart transplant on 12/25/2023. Endocrine consulted for T2DM management.     #T2DM (A1c  8.3%)  Home meds: unclear, per med rec patient on dapagliflozin 10mg daily, ozempic 1mg weekly, however daughter thinks ozempic was discontinued ? Need proper med rec after patient is extubated or if can contact PCP  - c/w insulin drip for now as patient not eating yet and on high doses of steroids  - will reassess insulin requirements daily   - please notify us if your team desires to transition patient from insulin drip to basal-bolus  - Steroids: s/p solumed 500mg on 12/25/23 now on solumed taper  - DISCHARGE RECOMMENDATIONS: depending on steroid regimen on discharge and isnulin requirements basal-bolus regimen, TBD.  - OUT-PATIENT FOLLOW UP: Patient should follow up with PCP vs Endocrinology depending on insulin requirement (pt does not have endocrinolgist)    #HLD  - patient not on statin  - goal LDL in diabetes mellitus is <70    #HTN  - patient on losartan 25mg daily at home  - goal BP in diabetes mellitus is <130/80  - defer to primary team for management      discussed with patient and primary team   Contact via Microsoft Teams during business hours  To reach covering provider access AMION via sunrise tools  For Urgent matters/after-hours/weekends/holidays please page endocrine fellow on call   For nonurgent matters please email [email or web address]    Please note that this patient may be followed by different provider tomorrow.  Notify endocrine 24 hours prior to discharge for final recommendations

## 2023-12-27 NOTE — PROGRESS NOTE ADULT - SUBJECTIVE AND OBJECTIVE BOX
seen earlier today     Chief Complaint: Diabetes Mellitus follow up    INTERVAL HX: has not tolerated po yet, seen in ctu, working with PT , remains on insulin gtt       Review of Systems:  General: As above  GI: No nausea, vomiting  Endocrine: no  S&Sx of hypoglycemia    Allergies    penicillins (Unknown)    Intolerances      MEDICATIONS  (STANDING):  budesonide  80 MICROgram(s)/formoterol 4.5 MICROgram(s) Inhaler 1 Puff(s) Inhalation two times a day  cefepime   IVPB 1000 milliGRAM(s) IV Intermittent every 8 hours  chlorhexidine 4% Liquid 1 Application(s) Topical <User Schedule>  dextrose 50% Injectable 50 milliLiter(s) IV Push every 15 minutes  gabapentin 100 milliGRAM(s) Oral every 8 hours  heparin   Injectable 5000 Unit(s) SubCutaneous every 8 hours  insulin regular Infusion 3 Unit(s)/Hr (3 mL/Hr) IV Continuous <Continuous>  lidocaine   4% Patch 3 Patch Transdermal daily  methocarbamol 500 milliGRAM(s) Oral every 8 hours  methylPREDNISolone sodium succinate Injectable   IV Push   methylPREDNISolone sodium succinate Injectable 36 milliGRAM(s) IV Push every 12 hours  metoclopramide Injectable 10 milliGRAM(s) IV Push every 8 hours  milrinone Infusion 0.25 MICROgram(s)/kG/Min (7.42 mL/Hr) IV Continuous <Continuous>  mupirocin 2% Ointment 1 Application(s) Topical two times a day  mycophenolate mofetil IVPB 1000 milliGRAM(s) IV Intermittent <User Schedule>  naloxegol 25 milliGRAM(s) Oral daily  niCARdipine Infusion 3 mG/Hr (15 mL/Hr) IV Continuous <Continuous>  nystatin    Suspension 706752 Unit(s) Oral <User Schedule>  pantoprazole  Injectable 40 milliGRAM(s) IV Push daily  polyethylene glycol 3350 17 Gram(s) Oral daily  senna 2 Tablet(s) Oral at bedtime  simethicone 160 milliGRAM(s) Chew every 8 hours  sodium chloride 0.9%. 1000 milliLiter(s) (10 mL/Hr) IV Continuous <Continuous>  tacrolimus 2 milliGRAM(s) Oral <User Schedule>  traMADol 25 milliGRAM(s) Oral every 8 hours  vancomycin    Solution 125 milliGRAM(s) Oral every 12 hours        methylPREDNISolone sodium succinate Injectable   40 milliGRAM(s) IV Push (12-27-23 @ 05:36)   40 milliGRAM(s) IV Push (12-26-23 @ 17:27)        PHYSICAL EXAM:  VITALS: T(C): 36.7 (12-27-23 @ 12:00)  T(F): 98 (12-27-23 @ 12:00), Max: 98.8 (12-26-23 @ 20:00)  HR: 85 (12-27-23 @ 15:30) (85 - 93)  BP: --  RR:  (10 - 55)  SpO2:  (91% - 97%)  Wt(kg): --  GENERAL: NAD, chest tube x4 , right central line   Respiratory: Respirations unlabored HFNC   Extremities: Warm, no edema  NEURO: Alert , appropriate     LABS:  POCT Blood Glucose.: 146 mg/dL (12-27-23 @ 15:12)  POCT Blood Glucose.: 187 mg/dL (12-27-23 @ 13:56)  POCT Blood Glucose.: 134 mg/dL (12-27-23 @ 13:19)  POCT Blood Glucose.: 213 mg/dL (12-27-23 @ 11:54)  POCT Blood Glucose.: 178 mg/dL (12-27-23 @ 10:51)  POCT Blood Glucose.: 194 mg/dL (12-27-23 @ 09:48)  POCT Blood Glucose.: 79 mg/dL (12-27-23 @ 08:51)  POCT Blood Glucose.: 109 mg/dL (12-27-23 @ 07:57)  POCT Blood Glucose.: 122 mg/dL (12-27-23 @ 06:41)  POCT Blood Glucose.: 131 mg/dL (12-27-23 @ 05:53)  POCT Blood Glucose.: 137 mg/dL (12-27-23 @ 04:46)  POCT Blood Glucose.: 139 mg/dL (12-27-23 @ 03:52)  POCT Blood Glucose.: 160 mg/dL (12-27-23 @ 03:02)  POCT Blood Glucose.: 159 mg/dL (12-27-23 @ 02:00)  POCT Blood Glucose.: 197 mg/dL (12-27-23 @ 00:42)  POCT Blood Glucose.: 207 mg/dL (12-26-23 @ 23:52)  POCT Blood Glucose.: 205 mg/dL (12-26-23 @ 23:07)  POCT Blood Glucose.: 172 mg/dL (12-26-23 @ 22:03)  POCT Blood Glucose.: 185 mg/dL (12-26-23 @ 20:48)  POCT Blood Glucose.: 191 mg/dL (12-26-23 @ 19:48)  POCT Blood Glucose.: 148 mg/dL (12-26-23 @ 18:42)  POCT Blood Glucose.: 95 mg/dL (12-26-23 @ 17:48)  POCT Blood Glucose.: 101 mg/dL (12-26-23 @ 16:44)  POCT Blood Glucose.: 103 mg/dL (12-26-23 @ 15:57)  POCT Blood Glucose.: 111 mg/dL (12-26-23 @ 14:52)  POCT Blood Glucose.: 118 mg/dL (12-26-23 @ 14:09)  POCT Blood Glucose.: 129 mg/dL (12-26-23 @ 13:08)  POCT Blood Glucose.: 143 mg/dL (12-26-23 @ 11:17)  POCT Blood Glucose.: 188 mg/dL (12-26-23 @ 09:55)  POCT Blood Glucose.: 168 mg/dL (12-26-23 @ 09:10)  POCT Blood Glucose.: 160 mg/dL (12-26-23 @ 08:06)  POCT Blood Glucose.: 157 mg/dL (12-26-23 @ 06:45)  POCT Blood Glucose.: 170 mg/dL (12-26-23 @ 05:47)  POCT Blood Glucose.: 179 mg/dL (12-26-23 @ 05:05)  POCT Blood Glucose.: 160 mg/dL (12-26-23 @ 03:52)  POCT Blood Glucose.: 97 mg/dL (12-26-23 @ 02:46)  POCT Blood Glucose.: 104 mg/dL (12-26-23 @ 01:57)  POCT Blood Glucose.: 114 mg/dL (12-26-23 @ 00:53)  POCT Blood Glucose.: 122 mg/dL (12-25-23 @ 23:56)  POCT Blood Glucose.: 131 mg/dL (12-25-23 @ 22:56)  POCT Blood Glucose.: 150 mg/dL (12-25-23 @ 21:58)  POCT Blood Glucose.: 147 mg/dL (12-25-23 @ 20:49)  POCT Blood Glucose.: 146 mg/dL (12-25-23 @ 19:52)  POCT Blood Glucose.: 159 mg/dL (12-25-23 @ 18:45)  POCT Blood Glucose.: 163 mg/dL (12-25-23 @ 18:11)  POCT Blood Glucose.: 181 mg/dL (12-25-23 @ 16:51)  POCT Blood Glucose.: 188 mg/dL (12-25-23 @ 15:47)  POCT Blood Glucose.: 208 mg/dL (12-25-23 @ 14:49)  POCT Blood Glucose.: 199 mg/dL (12-25-23 @ 13:54)  POCT Blood Glucose.: 215 mg/dL (12-25-23 @ 12:53)  POCT Blood Glucose.: 218 mg/dL (12-25-23 @ 11:58)  POCT Blood Glucose.: 215 mg/dL (12-25-23 @ 10:55)  POCT Blood Glucose.: 226 mg/dL (12-25-23 @ 09:57)  POCT Blood Glucose.: 102 mg/dL (12-24-23 @ 21:29)  POCT Blood Glucose.: 121 mg/dL (12-24-23 @ 16:15)                          8.0    9.90  )-----------( 133      ( 27 Dec 2023 00:23 )             25.0     12-27    137  |  102  |  33<H>  ----------------------------<  205<H>  3.9   |  22  |  1.34<H>    Ca    9.3      27 Dec 2023 00:23  Phos  4.9     12-27  Mg     2.1     12-27    TPro  5.9<L>  /  Alb  3.8  /  TBili  0.4  /  DBili  x   /  AST  48<H>  /  ALT  34  /  AlkPhos  42  12-27    eGFR: 61 mL/min/1.73m2 (27 Dec 2023 00:23)    11-10 Chol 130 Direct LDL -- LDL calculated 75 HDL 37<L> Trig 95, 11-03 Chol 198 Direct LDL -- LDL calculated 114<H> HDL 24<L> Trig 344<H>  Thyroid Function Tests:      A1C with Estimated Average Glucose Result: 8.3 % (11-01-23 @ 06:14)    Estimated Average Glucose: 192 mg/dL (11-01-23 @ 06:14)        Diet, Consistent Carbohydrate/No Snacks (12-27-23 @ 11:00) [Active]              seen earlier today     Chief Complaint: Diabetes Mellitus follow up    INTERVAL HX: has not tolerated po yet, seen in ctu, working with PT , remains on insulin gtt       Review of Systems:  General: As above  GI: No nausea, vomiting  Endocrine: no  S&Sx of hypoglycemia    Allergies    penicillins (Unknown)    Intolerances      MEDICATIONS  (STANDING):  budesonide  80 MICROgram(s)/formoterol 4.5 MICROgram(s) Inhaler 1 Puff(s) Inhalation two times a day  cefepime   IVPB 1000 milliGRAM(s) IV Intermittent every 8 hours  chlorhexidine 4% Liquid 1 Application(s) Topical <User Schedule>  dextrose 50% Injectable 50 milliLiter(s) IV Push every 15 minutes  gabapentin 100 milliGRAM(s) Oral every 8 hours  heparin   Injectable 5000 Unit(s) SubCutaneous every 8 hours  insulin regular Infusion 3 Unit(s)/Hr (3 mL/Hr) IV Continuous <Continuous>  lidocaine   4% Patch 3 Patch Transdermal daily  methocarbamol 500 milliGRAM(s) Oral every 8 hours  methylPREDNISolone sodium succinate Injectable   IV Push   methylPREDNISolone sodium succinate Injectable 36 milliGRAM(s) IV Push every 12 hours  metoclopramide Injectable 10 milliGRAM(s) IV Push every 8 hours  milrinone Infusion 0.25 MICROgram(s)/kG/Min (7.42 mL/Hr) IV Continuous <Continuous>  mupirocin 2% Ointment 1 Application(s) Topical two times a day  mycophenolate mofetil IVPB 1000 milliGRAM(s) IV Intermittent <User Schedule>  naloxegol 25 milliGRAM(s) Oral daily  niCARdipine Infusion 3 mG/Hr (15 mL/Hr) IV Continuous <Continuous>  nystatin    Suspension 225584 Unit(s) Oral <User Schedule>  pantoprazole  Injectable 40 milliGRAM(s) IV Push daily  polyethylene glycol 3350 17 Gram(s) Oral daily  senna 2 Tablet(s) Oral at bedtime  simethicone 160 milliGRAM(s) Chew every 8 hours  sodium chloride 0.9%. 1000 milliLiter(s) (10 mL/Hr) IV Continuous <Continuous>  tacrolimus 2 milliGRAM(s) Oral <User Schedule>  traMADol 25 milliGRAM(s) Oral every 8 hours  vancomycin    Solution 125 milliGRAM(s) Oral every 12 hours        methylPREDNISolone sodium succinate Injectable   40 milliGRAM(s) IV Push (12-27-23 @ 05:36)   40 milliGRAM(s) IV Push (12-26-23 @ 17:27)        PHYSICAL EXAM:  VITALS: T(C): 36.7 (12-27-23 @ 12:00)  T(F): 98 (12-27-23 @ 12:00), Max: 98.8 (12-26-23 @ 20:00)  HR: 85 (12-27-23 @ 15:30) (85 - 93)  BP: --  RR:  (10 - 55)  SpO2:  (91% - 97%)  Wt(kg): --  GENERAL: NAD, chest tube x4 , right central line   Respiratory: Respirations unlabored HFNC   Extremities: Warm, no edema  NEURO: Alert , appropriate     LABS:  POCT Blood Glucose.: 146 mg/dL (12-27-23 @ 15:12)  POCT Blood Glucose.: 187 mg/dL (12-27-23 @ 13:56)  POCT Blood Glucose.: 134 mg/dL (12-27-23 @ 13:19)  POCT Blood Glucose.: 213 mg/dL (12-27-23 @ 11:54)  POCT Blood Glucose.: 178 mg/dL (12-27-23 @ 10:51)  POCT Blood Glucose.: 194 mg/dL (12-27-23 @ 09:48)  POCT Blood Glucose.: 79 mg/dL (12-27-23 @ 08:51)  POCT Blood Glucose.: 109 mg/dL (12-27-23 @ 07:57)  POCT Blood Glucose.: 122 mg/dL (12-27-23 @ 06:41)  POCT Blood Glucose.: 131 mg/dL (12-27-23 @ 05:53)  POCT Blood Glucose.: 137 mg/dL (12-27-23 @ 04:46)  POCT Blood Glucose.: 139 mg/dL (12-27-23 @ 03:52)  POCT Blood Glucose.: 160 mg/dL (12-27-23 @ 03:02)  POCT Blood Glucose.: 159 mg/dL (12-27-23 @ 02:00)  POCT Blood Glucose.: 197 mg/dL (12-27-23 @ 00:42)  POCT Blood Glucose.: 207 mg/dL (12-26-23 @ 23:52)  POCT Blood Glucose.: 205 mg/dL (12-26-23 @ 23:07)  POCT Blood Glucose.: 172 mg/dL (12-26-23 @ 22:03)  POCT Blood Glucose.: 185 mg/dL (12-26-23 @ 20:48)  POCT Blood Glucose.: 191 mg/dL (12-26-23 @ 19:48)  POCT Blood Glucose.: 148 mg/dL (12-26-23 @ 18:42)  POCT Blood Glucose.: 95 mg/dL (12-26-23 @ 17:48)  POCT Blood Glucose.: 101 mg/dL (12-26-23 @ 16:44)  POCT Blood Glucose.: 103 mg/dL (12-26-23 @ 15:57)  POCT Blood Glucose.: 111 mg/dL (12-26-23 @ 14:52)  POCT Blood Glucose.: 118 mg/dL (12-26-23 @ 14:09)  POCT Blood Glucose.: 129 mg/dL (12-26-23 @ 13:08)  POCT Blood Glucose.: 143 mg/dL (12-26-23 @ 11:17)  POCT Blood Glucose.: 188 mg/dL (12-26-23 @ 09:55)  POCT Blood Glucose.: 168 mg/dL (12-26-23 @ 09:10)  POCT Blood Glucose.: 160 mg/dL (12-26-23 @ 08:06)  POCT Blood Glucose.: 157 mg/dL (12-26-23 @ 06:45)  POCT Blood Glucose.: 170 mg/dL (12-26-23 @ 05:47)  POCT Blood Glucose.: 179 mg/dL (12-26-23 @ 05:05)  POCT Blood Glucose.: 160 mg/dL (12-26-23 @ 03:52)  POCT Blood Glucose.: 97 mg/dL (12-26-23 @ 02:46)  POCT Blood Glucose.: 104 mg/dL (12-26-23 @ 01:57)  POCT Blood Glucose.: 114 mg/dL (12-26-23 @ 00:53)  POCT Blood Glucose.: 122 mg/dL (12-25-23 @ 23:56)  POCT Blood Glucose.: 131 mg/dL (12-25-23 @ 22:56)  POCT Blood Glucose.: 150 mg/dL (12-25-23 @ 21:58)  POCT Blood Glucose.: 147 mg/dL (12-25-23 @ 20:49)  POCT Blood Glucose.: 146 mg/dL (12-25-23 @ 19:52)  POCT Blood Glucose.: 159 mg/dL (12-25-23 @ 18:45)  POCT Blood Glucose.: 163 mg/dL (12-25-23 @ 18:11)  POCT Blood Glucose.: 181 mg/dL (12-25-23 @ 16:51)  POCT Blood Glucose.: 188 mg/dL (12-25-23 @ 15:47)  POCT Blood Glucose.: 208 mg/dL (12-25-23 @ 14:49)  POCT Blood Glucose.: 199 mg/dL (12-25-23 @ 13:54)  POCT Blood Glucose.: 215 mg/dL (12-25-23 @ 12:53)  POCT Blood Glucose.: 218 mg/dL (12-25-23 @ 11:58)  POCT Blood Glucose.: 215 mg/dL (12-25-23 @ 10:55)  POCT Blood Glucose.: 226 mg/dL (12-25-23 @ 09:57)  POCT Blood Glucose.: 102 mg/dL (12-24-23 @ 21:29)  POCT Blood Glucose.: 121 mg/dL (12-24-23 @ 16:15)                          8.0    9.90  )-----------( 133      ( 27 Dec 2023 00:23 )             25.0     12-27    137  |  102  |  33<H>  ----------------------------<  205<H>  3.9   |  22  |  1.34<H>    Ca    9.3      27 Dec 2023 00:23  Phos  4.9     12-27  Mg     2.1     12-27    TPro  5.9<L>  /  Alb  3.8  /  TBili  0.4  /  DBili  x   /  AST  48<H>  /  ALT  34  /  AlkPhos  42  12-27    eGFR: 61 mL/min/1.73m2 (27 Dec 2023 00:23)    11-10 Chol 130 Direct LDL -- LDL calculated 75 HDL 37<L> Trig 95, 11-03 Chol 198 Direct LDL -- LDL calculated 114<H> HDL 24<L> Trig 344<H>  Thyroid Function Tests:      A1C with Estimated Average Glucose Result: 8.3 % (11-01-23 @ 06:14)    Estimated Average Glucose: 192 mg/dL (11-01-23 @ 06:14)        Diet, Consistent Carbohydrate/No Snacks (12-27-23 @ 11:00) [Active]

## 2023-12-28 LAB
ALBUMIN SERPL ELPH-MCNC: 3.8 G/DL — SIGNIFICANT CHANGE UP (ref 3.3–5)
ALBUMIN SERPL ELPH-MCNC: 3.8 G/DL — SIGNIFICANT CHANGE UP (ref 3.3–5)
ALP SERPL-CCNC: 45 U/L — SIGNIFICANT CHANGE UP (ref 40–120)
ALP SERPL-CCNC: 45 U/L — SIGNIFICANT CHANGE UP (ref 40–120)
ALT FLD-CCNC: 47 U/L — HIGH (ref 10–45)
ALT FLD-CCNC: 47 U/L — HIGH (ref 10–45)
ANION GAP SERPL CALC-SCNC: 11 MMOL/L — SIGNIFICANT CHANGE UP (ref 5–17)
ANION GAP SERPL CALC-SCNC: 11 MMOL/L — SIGNIFICANT CHANGE UP (ref 5–17)
APTT BLD: 26.6 SEC — SIGNIFICANT CHANGE UP (ref 24.5–35.6)
APTT BLD: 26.6 SEC — SIGNIFICANT CHANGE UP (ref 24.5–35.6)
AST SERPL-CCNC: 34 U/L — SIGNIFICANT CHANGE UP (ref 10–40)
AST SERPL-CCNC: 34 U/L — SIGNIFICANT CHANGE UP (ref 10–40)
BASE EXCESS BLDV CALC-SCNC: -0.8 MMOL/L — SIGNIFICANT CHANGE UP (ref -2–3)
BASE EXCESS BLDV CALC-SCNC: -0.8 MMOL/L — SIGNIFICANT CHANGE UP (ref -2–3)
BASE EXCESS BLDV CALC-SCNC: -1.3 MMOL/L — SIGNIFICANT CHANGE UP (ref -2–3)
BASE EXCESS BLDV CALC-SCNC: -1.3 MMOL/L — SIGNIFICANT CHANGE UP (ref -2–3)
BASE EXCESS BLDV CALC-SCNC: 0.8 MMOL/L — SIGNIFICANT CHANGE UP (ref -2–3)
BASE EXCESS BLDV CALC-SCNC: 0.8 MMOL/L — SIGNIFICANT CHANGE UP (ref -2–3)
BASE EXCESS BLDV CALC-SCNC: 2.4 MMOL/L — SIGNIFICANT CHANGE UP (ref -2–3)
BASOPHILS # BLD AUTO: 0 K/UL — SIGNIFICANT CHANGE UP (ref 0–0.2)
BASOPHILS # BLD AUTO: 0 K/UL — SIGNIFICANT CHANGE UP (ref 0–0.2)
BASOPHILS NFR BLD AUTO: 0 % — SIGNIFICANT CHANGE UP (ref 0–2)
BASOPHILS NFR BLD AUTO: 0 % — SIGNIFICANT CHANGE UP (ref 0–2)
BILIRUB SERPL-MCNC: 0.3 MG/DL — SIGNIFICANT CHANGE UP (ref 0.2–1.2)
BILIRUB SERPL-MCNC: 0.3 MG/DL — SIGNIFICANT CHANGE UP (ref 0.2–1.2)
BLD GP AB SCN SERPL QL: NEGATIVE — SIGNIFICANT CHANGE UP
BLD GP AB SCN SERPL QL: NEGATIVE — SIGNIFICANT CHANGE UP
BUN SERPL-MCNC: 32 MG/DL — HIGH (ref 7–23)
BUN SERPL-MCNC: 32 MG/DL — HIGH (ref 7–23)
CA-I SERPL-SCNC: 1.16 MMOL/L — SIGNIFICANT CHANGE UP (ref 1.15–1.33)
CA-I SERPL-SCNC: 1.16 MMOL/L — SIGNIFICANT CHANGE UP (ref 1.15–1.33)
CA-I SERPL-SCNC: 1.2 MMOL/L — SIGNIFICANT CHANGE UP (ref 1.15–1.33)
CA-I SERPL-SCNC: 1.2 MMOL/L — SIGNIFICANT CHANGE UP (ref 1.15–1.33)
CA-I SERPL-SCNC: 1.26 MMOL/L — SIGNIFICANT CHANGE UP (ref 1.15–1.33)
CA-I SERPL-SCNC: 1.26 MMOL/L — SIGNIFICANT CHANGE UP (ref 1.15–1.33)
CALCIUM SERPL-MCNC: 8.7 MG/DL — SIGNIFICANT CHANGE UP (ref 8.4–10.5)
CALCIUM SERPL-MCNC: 8.7 MG/DL — SIGNIFICANT CHANGE UP (ref 8.4–10.5)
CHLORIDE BLDV-SCNC: 100 MMOL/L — SIGNIFICANT CHANGE UP (ref 96–108)
CHLORIDE BLDV-SCNC: 100 MMOL/L — SIGNIFICANT CHANGE UP (ref 96–108)
CHLORIDE BLDV-SCNC: 102 MMOL/L — SIGNIFICANT CHANGE UP (ref 96–108)
CHLORIDE BLDV-SCNC: 102 MMOL/L — SIGNIFICANT CHANGE UP (ref 96–108)
CHLORIDE BLDV-SCNC: 104 MMOL/L — SIGNIFICANT CHANGE UP (ref 96–108)
CHLORIDE BLDV-SCNC: 104 MMOL/L — SIGNIFICANT CHANGE UP (ref 96–108)
CHLORIDE SERPL-SCNC: 104 MMOL/L — SIGNIFICANT CHANGE UP (ref 96–108)
CHLORIDE SERPL-SCNC: 104 MMOL/L — SIGNIFICANT CHANGE UP (ref 96–108)
CO2 BLDV-SCNC: 25 MMOL/L — SIGNIFICANT CHANGE UP (ref 22–26)
CO2 BLDV-SCNC: 25 MMOL/L — SIGNIFICANT CHANGE UP (ref 22–26)
CO2 BLDV-SCNC: 26 MMOL/L — SIGNIFICANT CHANGE UP (ref 22–26)
CO2 BLDV-SCNC: 28 MMOL/L — HIGH (ref 22–26)
CO2 SERPL-SCNC: 21 MMOL/L — LOW (ref 22–31)
CO2 SERPL-SCNC: 21 MMOL/L — LOW (ref 22–31)
CREAT SERPL-MCNC: 1.1 MG/DL — SIGNIFICANT CHANGE UP (ref 0.5–1.3)
CREAT SERPL-MCNC: 1.1 MG/DL — SIGNIFICANT CHANGE UP (ref 0.5–1.3)
EGFR: 77 ML/MIN/1.73M2 — SIGNIFICANT CHANGE UP
EGFR: 77 ML/MIN/1.73M2 — SIGNIFICANT CHANGE UP
EOSINOPHIL # BLD AUTO: 0 K/UL — SIGNIFICANT CHANGE UP (ref 0–0.5)
EOSINOPHIL # BLD AUTO: 0 K/UL — SIGNIFICANT CHANGE UP (ref 0–0.5)
EOSINOPHIL NFR BLD AUTO: 0 % — SIGNIFICANT CHANGE UP (ref 0–6)
EOSINOPHIL NFR BLD AUTO: 0 % — SIGNIFICANT CHANGE UP (ref 0–6)
FIBRINOGEN PPP-MCNC: 352 MG/DL — SIGNIFICANT CHANGE UP (ref 200–445)
FIBRINOGEN PPP-MCNC: 352 MG/DL — SIGNIFICANT CHANGE UP (ref 200–445)
GAS PNL BLDA: SIGNIFICANT CHANGE UP
GAS PNL BLDV: 134 MMOL/L — LOW (ref 136–145)
GAS PNL BLDV: 135 MMOL/L — LOW (ref 136–145)
GAS PNL BLDV: 135 MMOL/L — LOW (ref 136–145)
GAS PNL BLDV: SIGNIFICANT CHANGE UP
GLUCOSE BLDC GLUCOMTR-MCNC: 109 MG/DL — HIGH (ref 70–99)
GLUCOSE BLDC GLUCOMTR-MCNC: 109 MG/DL — HIGH (ref 70–99)
GLUCOSE BLDC GLUCOMTR-MCNC: 110 MG/DL — HIGH (ref 70–99)
GLUCOSE BLDC GLUCOMTR-MCNC: 110 MG/DL — HIGH (ref 70–99)
GLUCOSE BLDC GLUCOMTR-MCNC: 112 MG/DL — HIGH (ref 70–99)
GLUCOSE BLDC GLUCOMTR-MCNC: 112 MG/DL — HIGH (ref 70–99)
GLUCOSE BLDC GLUCOMTR-MCNC: 114 MG/DL — HIGH (ref 70–99)
GLUCOSE BLDC GLUCOMTR-MCNC: 120 MG/DL — HIGH (ref 70–99)
GLUCOSE BLDC GLUCOMTR-MCNC: 120 MG/DL — HIGH (ref 70–99)
GLUCOSE BLDC GLUCOMTR-MCNC: 133 MG/DL — HIGH (ref 70–99)
GLUCOSE BLDC GLUCOMTR-MCNC: 133 MG/DL — HIGH (ref 70–99)
GLUCOSE BLDC GLUCOMTR-MCNC: 135 MG/DL — HIGH (ref 70–99)
GLUCOSE BLDC GLUCOMTR-MCNC: 135 MG/DL — HIGH (ref 70–99)
GLUCOSE BLDC GLUCOMTR-MCNC: 139 MG/DL — HIGH (ref 70–99)
GLUCOSE BLDC GLUCOMTR-MCNC: 139 MG/DL — HIGH (ref 70–99)
GLUCOSE BLDC GLUCOMTR-MCNC: 145 MG/DL — HIGH (ref 70–99)
GLUCOSE BLDC GLUCOMTR-MCNC: 145 MG/DL — HIGH (ref 70–99)
GLUCOSE BLDC GLUCOMTR-MCNC: 152 MG/DL — HIGH (ref 70–99)
GLUCOSE BLDC GLUCOMTR-MCNC: 152 MG/DL — HIGH (ref 70–99)
GLUCOSE BLDC GLUCOMTR-MCNC: 156 MG/DL — HIGH (ref 70–99)
GLUCOSE BLDC GLUCOMTR-MCNC: 156 MG/DL — HIGH (ref 70–99)
GLUCOSE BLDC GLUCOMTR-MCNC: 162 MG/DL — HIGH (ref 70–99)
GLUCOSE BLDC GLUCOMTR-MCNC: 162 MG/DL — HIGH (ref 70–99)
GLUCOSE BLDC GLUCOMTR-MCNC: 170 MG/DL — HIGH (ref 70–99)
GLUCOSE BLDC GLUCOMTR-MCNC: 170 MG/DL — HIGH (ref 70–99)
GLUCOSE BLDC GLUCOMTR-MCNC: 173 MG/DL — HIGH (ref 70–99)
GLUCOSE BLDC GLUCOMTR-MCNC: 173 MG/DL — HIGH (ref 70–99)
GLUCOSE BLDC GLUCOMTR-MCNC: 177 MG/DL — HIGH (ref 70–99)
GLUCOSE BLDC GLUCOMTR-MCNC: 177 MG/DL — HIGH (ref 70–99)
GLUCOSE BLDC GLUCOMTR-MCNC: 181 MG/DL — HIGH (ref 70–99)
GLUCOSE BLDC GLUCOMTR-MCNC: 181 MG/DL — HIGH (ref 70–99)
GLUCOSE BLDC GLUCOMTR-MCNC: 185 MG/DL — HIGH (ref 70–99)
GLUCOSE BLDC GLUCOMTR-MCNC: 196 MG/DL — HIGH (ref 70–99)
GLUCOSE BLDC GLUCOMTR-MCNC: 196 MG/DL — HIGH (ref 70–99)
GLUCOSE BLDC GLUCOMTR-MCNC: 198 MG/DL — HIGH (ref 70–99)
GLUCOSE BLDC GLUCOMTR-MCNC: 198 MG/DL — HIGH (ref 70–99)
GLUCOSE BLDC GLUCOMTR-MCNC: 224 MG/DL — HIGH (ref 70–99)
GLUCOSE BLDC GLUCOMTR-MCNC: 224 MG/DL — HIGH (ref 70–99)
GLUCOSE BLDC GLUCOMTR-MCNC: 71 MG/DL — SIGNIFICANT CHANGE UP (ref 70–99)
GLUCOSE BLDC GLUCOMTR-MCNC: 71 MG/DL — SIGNIFICANT CHANGE UP (ref 70–99)
GLUCOSE BLDC GLUCOMTR-MCNC: 77 MG/DL — SIGNIFICANT CHANGE UP (ref 70–99)
GLUCOSE BLDC GLUCOMTR-MCNC: 77 MG/DL — SIGNIFICANT CHANGE UP (ref 70–99)
GLUCOSE BLDV-MCNC: 105 MG/DL — HIGH (ref 70–99)
GLUCOSE BLDV-MCNC: 105 MG/DL — HIGH (ref 70–99)
GLUCOSE BLDV-MCNC: 185 MG/DL — HIGH (ref 70–99)
GLUCOSE BLDV-MCNC: 185 MG/DL — HIGH (ref 70–99)
GLUCOSE BLDV-MCNC: 192 MG/DL — HIGH (ref 70–99)
GLUCOSE BLDV-MCNC: 192 MG/DL — HIGH (ref 70–99)
GLUCOSE SERPL-MCNC: 192 MG/DL — HIGH (ref 70–99)
GLUCOSE SERPL-MCNC: 192 MG/DL — HIGH (ref 70–99)
HCO3 BLDV-SCNC: 24 MMOL/L — SIGNIFICANT CHANGE UP (ref 22–29)
HCO3 BLDV-SCNC: 25 MMOL/L — SIGNIFICANT CHANGE UP (ref 22–29)
HCO3 BLDV-SCNC: 25 MMOL/L — SIGNIFICANT CHANGE UP (ref 22–29)
HCO3 BLDV-SCNC: 27 MMOL/L — SIGNIFICANT CHANGE UP (ref 22–29)
HCT VFR BLD CALC: 27.2 % — LOW (ref 39–50)
HCT VFR BLD CALC: 27.2 % — LOW (ref 39–50)
HCT VFR BLDA CALC: 26 % — LOW (ref 39–51)
HCT VFR BLDA CALC: 26 % — LOW (ref 39–51)
HCT VFR BLDA CALC: 31 % — LOW (ref 39–51)
HCT VFR BLDA CALC: 31 % — LOW (ref 39–51)
HCT VFR BLDA CALC: 32 % — LOW (ref 39–51)
HCT VFR BLDA CALC: 32 % — LOW (ref 39–51)
HGB BLD CALC-MCNC: 10.3 G/DL — LOW (ref 12.6–17.4)
HGB BLD CALC-MCNC: 10.3 G/DL — LOW (ref 12.6–17.4)
HGB BLD CALC-MCNC: 10.5 G/DL — LOW (ref 12.6–17.4)
HGB BLD CALC-MCNC: 10.5 G/DL — LOW (ref 12.6–17.4)
HGB BLD CALC-MCNC: 8.5 G/DL — LOW (ref 12.6–17.4)
HGB BLD CALC-MCNC: 8.5 G/DL — LOW (ref 12.6–17.4)
HGB BLD-MCNC: 8.8 G/DL — LOW (ref 13–17)
HGB BLD-MCNC: 8.8 G/DL — LOW (ref 13–17)
HOROWITZ INDEX BLDV+IHG-RTO: 30 — SIGNIFICANT CHANGE UP
HOROWITZ INDEX BLDV+IHG-RTO: 44 — SIGNIFICANT CHANGE UP
IMM GRANULOCYTES NFR BLD AUTO: 0.8 % — SIGNIFICANT CHANGE UP (ref 0–0.9)
IMM GRANULOCYTES NFR BLD AUTO: 0.8 % — SIGNIFICANT CHANGE UP (ref 0–0.9)
INR BLD: 1.17 RATIO — SIGNIFICANT CHANGE UP (ref 0.85–1.18)
INR BLD: 1.17 RATIO — SIGNIFICANT CHANGE UP (ref 0.85–1.18)
LACTATE BLDV-MCNC: 1.3 MMOL/L — SIGNIFICANT CHANGE UP (ref 0.5–2)
LACTATE BLDV-MCNC: 1.3 MMOL/L — SIGNIFICANT CHANGE UP (ref 0.5–2)
LACTATE BLDV-MCNC: 1.4 MMOL/L — SIGNIFICANT CHANGE UP (ref 0.5–2)
LACTATE BLDV-MCNC: 1.4 MMOL/L — SIGNIFICANT CHANGE UP (ref 0.5–2)
LACTATE BLDV-MCNC: 2.8 MMOL/L — HIGH (ref 0.5–2)
LACTATE BLDV-MCNC: 2.8 MMOL/L — HIGH (ref 0.5–2)
LYMPHOCYTES # BLD AUTO: 0.75 K/UL — LOW (ref 1–3.3)
LYMPHOCYTES # BLD AUTO: 0.75 K/UL — LOW (ref 1–3.3)
LYMPHOCYTES # BLD AUTO: 7 % — LOW (ref 13–44)
LYMPHOCYTES # BLD AUTO: 7 % — LOW (ref 13–44)
MAGNESIUM SERPL-MCNC: 2 MG/DL — SIGNIFICANT CHANGE UP (ref 1.6–2.6)
MAGNESIUM SERPL-MCNC: 2 MG/DL — SIGNIFICANT CHANGE UP (ref 1.6–2.6)
MCHC RBC-ENTMCNC: 29.5 PG — SIGNIFICANT CHANGE UP (ref 27–34)
MCHC RBC-ENTMCNC: 29.5 PG — SIGNIFICANT CHANGE UP (ref 27–34)
MCHC RBC-ENTMCNC: 32.4 GM/DL — SIGNIFICANT CHANGE UP (ref 32–36)
MCHC RBC-ENTMCNC: 32.4 GM/DL — SIGNIFICANT CHANGE UP (ref 32–36)
MCV RBC AUTO: 91.3 FL — SIGNIFICANT CHANGE UP (ref 80–100)
MCV RBC AUTO: 91.3 FL — SIGNIFICANT CHANGE UP (ref 80–100)
MONOCYTES # BLD AUTO: 0.38 K/UL — SIGNIFICANT CHANGE UP (ref 0–0.9)
MONOCYTES # BLD AUTO: 0.38 K/UL — SIGNIFICANT CHANGE UP (ref 0–0.9)
MONOCYTES NFR BLD AUTO: 3.6 % — SIGNIFICANT CHANGE UP (ref 2–14)
MONOCYTES NFR BLD AUTO: 3.6 % — SIGNIFICANT CHANGE UP (ref 2–14)
NEUTROPHILS # BLD AUTO: 9.44 K/UL — HIGH (ref 1.8–7.4)
NEUTROPHILS # BLD AUTO: 9.44 K/UL — HIGH (ref 1.8–7.4)
NEUTROPHILS NFR BLD AUTO: 88.6 % — HIGH (ref 43–77)
NEUTROPHILS NFR BLD AUTO: 88.6 % — HIGH (ref 43–77)
NRBC # BLD: 0 /100 WBCS — SIGNIFICANT CHANGE UP (ref 0–0)
NRBC # BLD: 0 /100 WBCS — SIGNIFICANT CHANGE UP (ref 0–0)
PCO2 BLDV: 39 MMHG — LOW (ref 42–55)
PCO2 BLDV: 39 MMHG — LOW (ref 42–55)
PCO2 BLDV: 40 MMHG — LOW (ref 42–55)
PCO2 BLDV: 40 MMHG — LOW (ref 42–55)
PCO2 BLDV: 41 MMHG — LOW (ref 42–55)
PCO2 BLDV: 41 MMHG — LOW (ref 42–55)
PCO2 BLDV: 42 MMHG — SIGNIFICANT CHANGE UP (ref 42–55)
PH BLDV: 7.38 — SIGNIFICANT CHANGE UP (ref 7.32–7.43)
PH BLDV: 7.42 — SIGNIFICANT CHANGE UP (ref 7.32–7.43)
PHOSPHATE SERPL-MCNC: 2.3 MG/DL — LOW (ref 2.5–4.5)
PHOSPHATE SERPL-MCNC: 2.3 MG/DL — LOW (ref 2.5–4.5)
PLATELET # BLD AUTO: 150 K/UL — SIGNIFICANT CHANGE UP (ref 150–400)
PLATELET # BLD AUTO: 150 K/UL — SIGNIFICANT CHANGE UP (ref 150–400)
PO2 BLDV: 34 MMHG — SIGNIFICANT CHANGE UP (ref 25–45)
PO2 BLDV: 34 MMHG — SIGNIFICANT CHANGE UP (ref 25–45)
PO2 BLDV: 39 MMHG — SIGNIFICANT CHANGE UP (ref 25–45)
PO2 BLDV: 40 MMHG — SIGNIFICANT CHANGE UP (ref 25–45)
PO2 BLDV: 40 MMHG — SIGNIFICANT CHANGE UP (ref 25–45)
PO2 BLDV: 41 MMHG — SIGNIFICANT CHANGE UP (ref 25–45)
PO2 BLDV: 41 MMHG — SIGNIFICANT CHANGE UP (ref 25–45)
POTASSIUM BLDV-SCNC: 3.5 MMOL/L — SIGNIFICANT CHANGE UP (ref 3.5–5.1)
POTASSIUM BLDV-SCNC: 3.5 MMOL/L — SIGNIFICANT CHANGE UP (ref 3.5–5.1)
POTASSIUM BLDV-SCNC: 3.7 MMOL/L — SIGNIFICANT CHANGE UP (ref 3.5–5.1)
POTASSIUM SERPL-MCNC: 3.7 MMOL/L — SIGNIFICANT CHANGE UP (ref 3.5–5.3)
POTASSIUM SERPL-MCNC: 3.7 MMOL/L — SIGNIFICANT CHANGE UP (ref 3.5–5.3)
POTASSIUM SERPL-SCNC: 3.7 MMOL/L — SIGNIFICANT CHANGE UP (ref 3.5–5.3)
POTASSIUM SERPL-SCNC: 3.7 MMOL/L — SIGNIFICANT CHANGE UP (ref 3.5–5.3)
PROT SERPL-MCNC: 5.8 G/DL — LOW (ref 6–8.3)
PROT SERPL-MCNC: 5.8 G/DL — LOW (ref 6–8.3)
PROTHROM AB SERPL-ACNC: 12.8 SEC — SIGNIFICANT CHANGE UP (ref 9.5–13)
PROTHROM AB SERPL-ACNC: 12.8 SEC — SIGNIFICANT CHANGE UP (ref 9.5–13)
RBC # BLD: 2.98 M/UL — LOW (ref 4.2–5.8)
RBC # BLD: 2.98 M/UL — LOW (ref 4.2–5.8)
RBC # FLD: 15.1 % — HIGH (ref 10.3–14.5)
RBC # FLD: 15.1 % — HIGH (ref 10.3–14.5)
RH IG SCN BLD-IMP: POSITIVE — SIGNIFICANT CHANGE UP
RH IG SCN BLD-IMP: POSITIVE — SIGNIFICANT CHANGE UP
SAO2 % BLDV: 55.6 % — LOW (ref 67–88)
SAO2 % BLDV: 55.6 % — LOW (ref 67–88)
SAO2 % BLDV: 62.8 % — LOW (ref 67–88)
SAO2 % BLDV: 62.8 % — LOW (ref 67–88)
SAO2 % BLDV: 63.9 % — LOW (ref 67–88)
SAO2 % BLDV: 63.9 % — LOW (ref 67–88)
SAO2 % BLDV: 68.2 % — SIGNIFICANT CHANGE UP (ref 67–88)
SAO2 % BLDV: 68.2 % — SIGNIFICANT CHANGE UP (ref 67–88)
SAO2 % BLDV: 69.4 % — SIGNIFICANT CHANGE UP (ref 67–88)
SAO2 % BLDV: 69.4 % — SIGNIFICANT CHANGE UP (ref 67–88)
SODIUM SERPL-SCNC: 136 MMOL/L — SIGNIFICANT CHANGE UP (ref 135–145)
SODIUM SERPL-SCNC: 136 MMOL/L — SIGNIFICANT CHANGE UP (ref 135–145)
TACROLIMUS SERPL-MCNC: 2.6 NG/ML — SIGNIFICANT CHANGE UP
TACROLIMUS SERPL-MCNC: 2.6 NG/ML — SIGNIFICANT CHANGE UP
WBC # BLD: 10.65 K/UL — HIGH (ref 3.8–10.5)
WBC # BLD: 10.65 K/UL — HIGH (ref 3.8–10.5)
WBC # FLD AUTO: 10.65 K/UL — HIGH (ref 3.8–10.5)
WBC # FLD AUTO: 10.65 K/UL — HIGH (ref 3.8–10.5)

## 2023-12-28 PROCEDURE — 99233 SBSQ HOSP IP/OBS HIGH 50: CPT

## 2023-12-28 PROCEDURE — 99232 SBSQ HOSP IP/OBS MODERATE 35: CPT

## 2023-12-28 PROCEDURE — 99291 CRITICAL CARE FIRST HOUR: CPT

## 2023-12-28 PROCEDURE — 71045 X-RAY EXAM CHEST 1 VIEW: CPT | Mod: 26

## 2023-12-28 RX ORDER — POTASSIUM CHLORIDE 20 MEQ
10 PACKET (EA) ORAL
Refills: 0 | Status: COMPLETED | OUTPATIENT
Start: 2023-12-28 | End: 2023-12-28

## 2023-12-28 RX ORDER — HYDROMORPHONE HYDROCHLORIDE 2 MG/ML
0.25 INJECTION INTRAMUSCULAR; INTRAVENOUS; SUBCUTANEOUS ONCE
Refills: 0 | Status: DISCONTINUED | OUTPATIENT
Start: 2023-12-28 | End: 2023-12-28

## 2023-12-28 RX ORDER — SODIUM BICARBONATE 1 MEQ/ML
50 SYRINGE (ML) INTRAVENOUS ONCE
Refills: 0 | Status: COMPLETED | OUTPATIENT
Start: 2023-12-28 | End: 2023-12-28

## 2023-12-28 RX ORDER — MAGNESIUM SULFATE 500 MG/ML
2 VIAL (ML) INJECTION ONCE
Refills: 0 | Status: COMPLETED | OUTPATIENT
Start: 2023-12-28 | End: 2023-12-28

## 2023-12-28 RX ORDER — ACETAMINOPHEN 500 MG
1000 TABLET ORAL ONCE
Refills: 0 | Status: COMPLETED | OUTPATIENT
Start: 2023-12-28 | End: 2023-12-28

## 2023-12-28 RX ORDER — HYDRALAZINE HCL 50 MG
50 TABLET ORAL EVERY 8 HOURS
Refills: 0 | Status: DISCONTINUED | OUTPATIENT
Start: 2023-12-28 | End: 2023-12-29

## 2023-12-28 RX ORDER — NIFEDIPINE 30 MG
30 TABLET, EXTENDED RELEASE 24 HR ORAL EVERY 12 HOURS
Refills: 0 | Status: DISCONTINUED | OUTPATIENT
Start: 2023-12-28 | End: 2023-12-28

## 2023-12-28 RX ORDER — ACETAMINOPHEN 500 MG
1000 TABLET ORAL EVERY 8 HOURS
Refills: 0 | Status: COMPLETED | OUTPATIENT
Start: 2023-12-28 | End: 2023-12-29

## 2023-12-28 RX ORDER — TACROLIMUS 5 MG/1
4 CAPSULE ORAL
Refills: 0 | Status: DISCONTINUED | OUTPATIENT
Start: 2023-12-28 | End: 2023-12-29

## 2023-12-28 RX ORDER — POTASSIUM CHLORIDE 20 MEQ
10 PACKET (EA) ORAL ONCE
Refills: 0 | Status: COMPLETED | OUTPATIENT
Start: 2023-12-28 | End: 2023-12-28

## 2023-12-28 RX ORDER — TACROLIMUS 5 MG/1
2 CAPSULE ORAL ONCE
Refills: 0 | Status: COMPLETED | OUTPATIENT
Start: 2023-12-28 | End: 2023-12-28

## 2023-12-28 RX ORDER — MYCOPHENOLATE MOFETIL 250 MG/1
1000 CAPSULE ORAL EVERY 12 HOURS
Refills: 0 | Status: DISCONTINUED | OUTPATIENT
Start: 2023-12-28 | End: 2024-01-09

## 2023-12-28 RX ORDER — METHYLNALTREXONE BROMIDE 12 MG/.6ML
12 INJECTION, SOLUTION SUBCUTANEOUS EVERY OTHER DAY
Refills: 0 | Status: COMPLETED | OUTPATIENT
Start: 2023-12-28 | End: 2023-12-28

## 2023-12-28 RX ORDER — ACETAMINOPHEN 500 MG
650 TABLET ORAL EVERY 6 HOURS
Refills: 0 | Status: DISCONTINUED | OUTPATIENT
Start: 2023-12-29 | End: 2024-01-09

## 2023-12-28 RX ADMIN — Medication 25 MILLILITER(S): at 22:15

## 2023-12-28 RX ADMIN — Medication 10 MILLIGRAM(S): at 15:47

## 2023-12-28 RX ADMIN — Medication 1000 MILLIGRAM(S): at 21:00

## 2023-12-28 RX ADMIN — GABAPENTIN 100 MILLIGRAM(S): 400 CAPSULE ORAL at 05:16

## 2023-12-28 RX ADMIN — NICARDIPINE HYDROCHLORIDE 15 MG/HR: 30 CAPSULE, EXTENDED RELEASE ORAL at 00:40

## 2023-12-28 RX ADMIN — Medication 400 MILLIGRAM(S): at 16:00

## 2023-12-28 RX ADMIN — TRAMADOL HYDROCHLORIDE 25 MILLIGRAM(S): 50 TABLET ORAL at 14:00

## 2023-12-28 RX ADMIN — HEPARIN SODIUM 5000 UNIT(S): 5000 INJECTION INTRAVENOUS; SUBCUTANEOUS at 05:17

## 2023-12-28 RX ADMIN — TRAMADOL HYDROCHLORIDE 25 MILLIGRAM(S): 50 TABLET ORAL at 21:32

## 2023-12-28 RX ADMIN — Medication 36 MILLIGRAM(S): at 05:16

## 2023-12-28 RX ADMIN — BUDESONIDE AND FORMOTEROL FUMARATE DIHYDRATE 1 PUFF(S): 160; 4.5 AEROSOL RESPIRATORY (INHALATION) at 17:53

## 2023-12-28 RX ADMIN — TRAMADOL HYDROCHLORIDE 25 MILLIGRAM(S): 50 TABLET ORAL at 14:30

## 2023-12-28 RX ADMIN — Medication 1000 MILLIGRAM(S): at 16:15

## 2023-12-28 RX ADMIN — Medication 50 MILLIEQUIVALENT(S): at 16:18

## 2023-12-28 RX ADMIN — INSULIN HUMAN 3 UNIT(S)/HR: 100 INJECTION, SOLUTION SUBCUTANEOUS at 07:47

## 2023-12-28 RX ADMIN — Medication 1000 MILLIGRAM(S): at 03:00

## 2023-12-28 RX ADMIN — BUMETANIDE 1 MILLIGRAM(S): 0.25 INJECTION INTRAMUSCULAR; INTRAVENOUS at 18:45

## 2023-12-28 RX ADMIN — Medication 125 MILLIGRAM(S): at 05:15

## 2023-12-28 RX ADMIN — Medication 25 GRAM(S): at 02:48

## 2023-12-28 RX ADMIN — Medication 10 MILLIGRAM(S): at 14:17

## 2023-12-28 RX ADMIN — Medication 50 MILLIEQUIVALENT(S): at 22:08

## 2023-12-28 RX ADMIN — MYCOPHENOLATE MOFETIL 83.34 MILLIGRAM(S): 250 CAPSULE ORAL at 08:00

## 2023-12-28 RX ADMIN — SENNA PLUS 2 TABLET(S): 8.6 TABLET ORAL at 20:59

## 2023-12-28 RX ADMIN — Medication 50 MILLIEQUIVALENT(S): at 01:55

## 2023-12-28 RX ADMIN — METHOCARBAMOL 500 MILLIGRAM(S): 500 TABLET, FILM COATED ORAL at 05:17

## 2023-12-28 RX ADMIN — CEFEPIME 100 MILLIGRAM(S): 1 INJECTION, POWDER, FOR SOLUTION INTRAMUSCULAR; INTRAVENOUS at 07:57

## 2023-12-28 RX ADMIN — Medication 50 MILLIGRAM(S): at 20:57

## 2023-12-28 RX ADMIN — TACROLIMUS 2 MILLIGRAM(S): 5 CAPSULE ORAL at 15:01

## 2023-12-28 RX ADMIN — NICARDIPINE HYDROCHLORIDE 15 MG/HR: 30 CAPSULE, EXTENDED RELEASE ORAL at 07:46

## 2023-12-28 RX ADMIN — Medication 32 MILLIGRAM(S): at 18:45

## 2023-12-28 RX ADMIN — HEPARIN SODIUM 5000 UNIT(S): 5000 INJECTION INTRAVENOUS; SUBCUTANEOUS at 14:18

## 2023-12-28 RX ADMIN — HYDROMORPHONE HYDROCHLORIDE 0.25 MILLIGRAM(S): 2 INJECTION INTRAMUSCULAR; INTRAVENOUS; SUBCUTANEOUS at 12:30

## 2023-12-28 RX ADMIN — CEFEPIME 100 MILLIGRAM(S): 1 INJECTION, POWDER, FOR SOLUTION INTRAMUSCULAR; INTRAVENOUS at 00:38

## 2023-12-28 RX ADMIN — Medication 50 MILLIEQUIVALENT(S): at 18:00

## 2023-12-28 RX ADMIN — Medication 500000 UNIT(S): at 20:22

## 2023-12-28 RX ADMIN — CHLORHEXIDINE GLUCONATE 1 APPLICATION(S): 213 SOLUTION TOPICAL at 05:35

## 2023-12-28 RX ADMIN — Medication 500000 UNIT(S): at 16:33

## 2023-12-28 RX ADMIN — INSULIN HUMAN 3 UNIT(S)/HR: 100 INJECTION, SOLUTION SUBCUTANEOUS at 00:39

## 2023-12-28 RX ADMIN — BUDESONIDE AND FORMOTEROL FUMARATE DIHYDRATE 1 PUFF(S): 160; 4.5 AEROSOL RESPIRATORY (INHALATION) at 05:55

## 2023-12-28 RX ADMIN — Medication 63.75 MILLIMOLE(S): at 02:42

## 2023-12-28 RX ADMIN — Medication 400 MILLIGRAM(S): at 20:45

## 2023-12-28 RX ADMIN — Medication 50 MILLIEQUIVALENT(S): at 20:51

## 2023-12-28 RX ADMIN — HEPARIN SODIUM 5000 UNIT(S): 5000 INJECTION INTRAVENOUS; SUBCUTANEOUS at 20:56

## 2023-12-28 RX ADMIN — Medication 50 MILLIGRAM(S): at 15:01

## 2023-12-28 RX ADMIN — SODIUM CHLORIDE 10 MILLILITER(S): 9 INJECTION INTRAMUSCULAR; INTRAVENOUS; SUBCUTANEOUS at 08:00

## 2023-12-28 RX ADMIN — BUMETANIDE 1 MILLIGRAM(S): 0.25 INJECTION INTRAMUSCULAR; INTRAVENOUS at 06:01

## 2023-12-28 RX ADMIN — TRAMADOL HYDROCHLORIDE 25 MILLIGRAM(S): 50 TABLET ORAL at 05:47

## 2023-12-28 RX ADMIN — Medication 400 MILLIGRAM(S): at 02:45

## 2023-12-28 RX ADMIN — MYCOPHENOLATE MOFETIL 1000 MILLIGRAM(S): 250 CAPSULE ORAL at 20:22

## 2023-12-28 RX ADMIN — Medication 10 MILLIGRAM(S): at 05:16

## 2023-12-28 RX ADMIN — TACROLIMUS 2 MILLIGRAM(S): 5 CAPSULE ORAL at 07:55

## 2023-12-28 RX ADMIN — METHOCARBAMOL 500 MILLIGRAM(S): 500 TABLET, FILM COATED ORAL at 20:58

## 2023-12-28 RX ADMIN — Medication 25 MILLIGRAM(S): at 05:18

## 2023-12-28 RX ADMIN — Medication 400 MILLIGRAM(S): at 08:00

## 2023-12-28 RX ADMIN — TACROLIMUS 4 MILLIGRAM(S): 5 CAPSULE ORAL at 20:22

## 2023-12-28 RX ADMIN — Medication 500000 UNIT(S): at 07:54

## 2023-12-28 RX ADMIN — Medication 50 MILLIGRAM(S): at 14:18

## 2023-12-28 RX ADMIN — MUPIROCIN 1 APPLICATION(S): 20 OINTMENT TOPICAL at 05:18

## 2023-12-28 RX ADMIN — Medication 50 MILLIGRAM(S): at 21:01

## 2023-12-28 RX ADMIN — MUPIROCIN 1 APPLICATION(S): 20 OINTMENT TOPICAL at 18:46

## 2023-12-28 RX ADMIN — METHOCARBAMOL 500 MILLIGRAM(S): 500 TABLET, FILM COATED ORAL at 14:17

## 2023-12-28 RX ADMIN — Medication 1000 MILLIGRAM(S): at 08:15

## 2023-12-28 RX ADMIN — Medication 25 GRAM(S): at 18:30

## 2023-12-28 RX ADMIN — METHYLNALTREXONE BROMIDE 12 MILLIGRAM(S): 12 INJECTION, SOLUTION SUBCUTANEOUS at 20:20

## 2023-12-28 RX ADMIN — MILRINONE LACTATE 7.42 MICROGRAM(S)/KG/MIN: 1 INJECTION, SOLUTION INTRAVENOUS at 00:40

## 2023-12-28 RX ADMIN — Medication 10 MILLIGRAM(S): at 20:57

## 2023-12-28 RX ADMIN — TRAMADOL HYDROCHLORIDE 25 MILLIGRAM(S): 50 TABLET ORAL at 21:02

## 2023-12-28 RX ADMIN — Medication 50 MILLIEQUIVALENT(S): at 21:00

## 2023-12-28 RX ADMIN — MILRINONE LACTATE 3.71 MICROGRAM(S)/KG/MIN: 1 INJECTION, SOLUTION INTRAVENOUS at 07:59

## 2023-12-28 RX ADMIN — TRAMADOL HYDROCHLORIDE 25 MILLIGRAM(S): 50 TABLET ORAL at 05:17

## 2023-12-28 RX ADMIN — HYDROMORPHONE HYDROCHLORIDE 0.25 MILLIGRAM(S): 2 INJECTION INTRAMUSCULAR; INTRAVENOUS; SUBCUTANEOUS at 12:15

## 2023-12-28 NOTE — PROGRESS NOTE ADULT - ASSESSMENT
59 yo M with PMHx of HTN, CAD w/ 1 stent in 2009, ICH (2008) presented on 11/1 with abn ekg. Patient presented to [facility] where he was found to have STEMI, recommended to get cath however patient did not want to get it there so he left and came here.   EKG here with ST depression in lateral leads and elevation in anterior leads   Prior to C found to be tachycardic, dyspneic, intubated   [facility] 11/1 with chronic total occlusion of LAD and RCA, with elevated RA and PA pressures  TTE 11/1 with severely decreased EF 32%, s/p IABP 11/1        #s/p OHT 12/25/23 CMV D-/R+; Toxo D-/R-  Induction simulect and MPN  Maintenance tacro/MMF  ·  Plan: - CMV -/+: intermediate risk.  Will need to start on valganciclovir when able X 6 months.    - Toxo -/-: none required  - PJP ppx: will introduce eventually   - Trush ppx: eventual Nystatin.  - donor HCV TIM( -) but HCV antibody +.  - on daptomycin ppx for hx of rash to vancomycin  - donor sputum + for staph but with would not influence risk of infection in heart recipient    #pretransplant E faecalis bacteremia in c/o colonoscopy  completed therapy    #Rash- to vancomycin?  resolved    # hep B and Hep A not immune  received vaccine series but second dose given early  will need repeat ab titers    # Bacteremia.   ·  Plan: BCx from 11/17 with GPC in pair/chains in both bottles; Mixed cultures Staph epi and Enterococcus faecalis . Repeat cultures from 11/18; NGTD  + rods in blood culture on 11/30 (reported on 12/4= cutibacterium), repeat BCxs Negative Completed abx course.   receiving Daptomycin perioperative prophylaxis      Recommendations  ·	Complete perioperative ppx cefepime and daptomycin for 48 hours (the latter already discontinued)  ·	Donor with sputum staphylococcus- in the absence of donor bacteremia would not affect heart recipient outcomes and would not need further prophylaxis for this  ·	Valcyte and bactrim ppx as soon as able        Thank you for involving us in the care of this patient  Transplant ID will continue to follow  Please call or page with additional questions  Pager; #[phone]  Teams: from 8 am to 5 pm  Brandi Silva MD     59 yo M with PMHx of HTN, CAD w/ 1 stent in 2009, ICH (2008) presented on 11/1 with abn ekg. Patient presented to [facility] where he was found to have STEMI, recommended to get cath however patient did not want to get it there so he left and came here.   EKG here with ST depression in lateral leads and elevation in anterior leads   Prior to C found to be tachycardic, dyspneic, intubated   [facility] 11/1 with chronic total occlusion of LAD and RCA, with elevated RA and PA pressures  TTE 11/1 with severely decreased EF 32%, s/p IABP 11/1        #s/p OHT 12/25/23 CMV D-/R+; Toxo D-/R-  Induction simulect and MPN  Maintenance tacro/MMF  ·  Plan: - CMV -/+: intermediate risk.  Will need to start on valganciclovir when able X 6 months.    - Toxo -/-: none required  - PJP ppx: will introduce eventually   - Trush ppx: eventual Nystatin.  - donor HCV TIM( -) but HCV antibody +.  - on daptomycin ppx for hx of rash to vancomycin  - donor sputum + for staph but with would not influence risk of infection in heart recipient    #pretransplant E faecalis bacteremia in c/o colonoscopy  completed therapy    #Rash- to vancomycin?  resolved    # hep B and Hep A not immune  received vaccine series but second dose given early  will need repeat ab titers    # Bacteremia.   ·  Plan: BCx from 11/17 with GPC in pair/chains in both bottles; Mixed cultures Staph epi and Enterococcus faecalis . Repeat cultures from 11/18; NGTD  + rods in blood culture on 11/30 (reported on 12/4= cutibacterium), repeat BCxs Negative Completed abx course.   receiving Daptomycin perioperative prophylaxis      Recommendations  ·	Complete perioperative ppx cefepime and daptomycin for 48 hours (the latter already discontinued)  ·	Donor with sputum staphylococcus- in the absence of donor bacteremia would not affect heart recipient outcomes and would not need further prophylaxis for this  ·	Valcyte and bactrim ppx as soon as able        Thank you for involving us in the care of this patient  Transplant ID will continue to follow  Please call or page with additional questions  Pager; #[phone]  Teams: from 8 am to 5 pm  Brandi Silva MD

## 2023-12-28 NOTE — OCCUPATIONAL THERAPY INITIAL EVALUATION ADULT - BALANCE TRAINING, PT EVAL
Pt will increase dynamic standing balance to Good to increase safety/independence with functional transfers and ADLs within 4 weeks
Pt will increase dynamic standing balance to Good to increase safety/independence with functional transfers and ADLs within 4 weeks

## 2023-12-28 NOTE — PROGRESS NOTE ADULT - SUBJECTIVE AND OBJECTIVE BOX
seen earlier today     Chief Complaint: Diabetes Mellitus follow up    INTERVAL HX: patient confirmed home DM med of farxiga 10mg po qd , patient denies any other DM meds, reports he has not taken ozempic for over 4 months, patient endorses poor appetite and low po intake , insulin gtt requirements have increased, steroids taper per order       Review of Systems:  General: As above  GI: No nausea, vomiting  Endocrine: no  S&Sx of hypoglycemia    Allergies    penicillins (Unknown)    Intolerances      MEDICATIONS  (STANDING):  bisacodyl Suppository 10 milliGRAM(s) Rectal once  budesonide  80 MICROgram(s)/formoterol 4.5 MICROgram(s) Inhaler 1 Puff(s) Inhalation two times a day  buMETAnide Injectable 1 milliGRAM(s) IV Push every 12 hours  chlorhexidine 4% Liquid 1 Application(s) Topical <User Schedule>  dextrose 50% Injectable 50 milliLiter(s) IV Push every 15 minutes  gabapentin 100 milliGRAM(s) Oral every 8 hours  heparin   Injectable 5000 Unit(s) SubCutaneous every 8 hours  hydrALAZINE 50 milliGRAM(s) Oral every 8 hours  insulin regular Infusion 3 Unit(s)/Hr (3 mL/Hr) IV Continuous <Continuous>  lidocaine   4% Patch 3 Patch Transdermal daily  methocarbamol 500 milliGRAM(s) Oral every 8 hours  methylPREDNISolone sodium succinate Injectable   IV Push   methylPREDNISolone sodium succinate Injectable 32 milliGRAM(s) IV Push every 12 hours  metoclopramide Injectable 10 milliGRAM(s) IV Push every 8 hours  milrinone Infusion 0.125 MICROgram(s)/kG/Min (3.71 mL/Hr) IV Continuous <Continuous>  mupirocin 2% Ointment 1 Application(s) Topical two times a day  mycophenolate mofetil IVPB 1000 milliGRAM(s) IV Intermittent <User Schedule>  naloxegol 25 milliGRAM(s) Oral daily  niCARdipine Infusion 3 mG/Hr (15 mL/Hr) IV Continuous <Continuous>  nystatin    Suspension 869710 Unit(s) Oral <User Schedule>  pantoprazole  Injectable 40 milliGRAM(s) IV Push daily  polyethylene glycol 3350 17 Gram(s) Oral daily  senna 2 Tablet(s) Oral at bedtime  simethicone 160 milliGRAM(s) Chew every 8 hours  sodium chloride 0.9%. 1000 milliLiter(s) (10 mL/Hr) IV Continuous <Continuous>  tacrolimus 2 milliGRAM(s) Oral <User Schedule>  traMADol 25 milliGRAM(s) Oral every 8 hours        insulin regular Infusion   3 mL/Hr IV Continuous (12-28-23 @ 00:40)    methylPREDNISolone sodium succinate Injectable   36 milliGRAM(s) IV Push (12-28-23 @ 05:16)   36 milliGRAM(s) IV Push (12-27-23 @ 17:21)        PHYSICAL EXAM:  VITALS: T(C): 36.1 (12-28-23 @ 08:00)  T(F): 97 (12-28-23 @ 08:00), Max: 98 (12-27-23 @ 12:00)  HR: 80 (12-28-23 @ 10:00) (79 - 92)  BP: --  RR:  (12 - 33)  SpO2:  (91% - 97%)  Wt(kg): --  GENERAL: NAD, msi purple foam cdi, CTx4, holt , right neck central line   Respiratory: Respirations unlabored   Extremities: Warm, no edema  NEURO: Alert , appropriate     LABS:  POCT Blood Glucose.: 110 mg/dL (12-28-23 @ 10:12)  POCT Blood Glucose.: 120 mg/dL (12-28-23 @ 09:14)  POCT Blood Glucose.: 109 mg/dL (12-28-23 @ 08:09)  POCT Blood Glucose.: 139 mg/dL (12-28-23 @ 06:52)  POCT Blood Glucose.: 145 mg/dL (12-28-23 @ 06:08)  POCT Blood Glucose.: 152 mg/dL (12-28-23 @ 05:10)  POCT Blood Glucose.: 156 mg/dL (12-28-23 @ 04:02)  POCT Blood Glucose.: 185 mg/dL (12-28-23 @ 02:52)  POCT Blood Glucose.: 177 mg/dL (12-28-23 @ 01:49)  POCT Blood Glucose.: 185 mg/dL (12-28-23 @ 00:55)  POCT Blood Glucose.: 198 mg/dL (12-28-23 @ 00:11)  POCT Blood Glucose.: 210 mg/dL (12-27-23 @ 22:51)  POCT Blood Glucose.: 218 mg/dL (12-27-23 @ 22:02)  POCT Blood Glucose.: 179 mg/dL (12-27-23 @ 20:40)  POCT Blood Glucose.: 143 mg/dL (12-27-23 @ 18:47)  POCT Blood Glucose.: 231 mg/dL (12-27-23 @ 17:50)  POCT Blood Glucose.: 224 mg/dL (12-27-23 @ 17:04)  POCT Blood Glucose.: 146 mg/dL (12-27-23 @ 15:12)  POCT Blood Glucose.: 187 mg/dL (12-27-23 @ 13:56)  POCT Blood Glucose.: 134 mg/dL (12-27-23 @ 13:19)  POCT Blood Glucose.: 213 mg/dL (12-27-23 @ 11:54)  POCT Blood Glucose.: 178 mg/dL (12-27-23 @ 10:51)  POCT Blood Glucose.: 194 mg/dL (12-27-23 @ 09:48)  POCT Blood Glucose.: 79 mg/dL (12-27-23 @ 08:51)  POCT Blood Glucose.: 109 mg/dL (12-27-23 @ 07:57)  POCT Blood Glucose.: 122 mg/dL (12-27-23 @ 06:41)  POCT Blood Glucose.: 131 mg/dL (12-27-23 @ 05:53)  POCT Blood Glucose.: 137 mg/dL (12-27-23 @ 04:46)  POCT Blood Glucose.: 139 mg/dL (12-27-23 @ 03:52)  POCT Blood Glucose.: 160 mg/dL (12-27-23 @ 03:02)  POCT Blood Glucose.: 159 mg/dL (12-27-23 @ 02:00)  POCT Blood Glucose.: 197 mg/dL (12-27-23 @ 00:42)  POCT Blood Glucose.: 207 mg/dL (12-26-23 @ 23:52)  POCT Blood Glucose.: 205 mg/dL (12-26-23 @ 23:07)  POCT Blood Glucose.: 172 mg/dL (12-26-23 @ 22:03)  POCT Blood Glucose.: 185 mg/dL (12-26-23 @ 20:48)  POCT Blood Glucose.: 191 mg/dL (12-26-23 @ 19:48)  POCT Blood Glucose.: 148 mg/dL (12-26-23 @ 18:42)  POCT Blood Glucose.: 95 mg/dL (12-26-23 @ 17:48)  POCT Blood Glucose.: 101 mg/dL (12-26-23 @ 16:44)  POCT Blood Glucose.: 103 mg/dL (12-26-23 @ 15:57)  POCT Blood Glucose.: 111 mg/dL (12-26-23 @ 14:52)  POCT Blood Glucose.: 118 mg/dL (12-26-23 @ 14:09)  POCT Blood Glucose.: 129 mg/dL (12-26-23 @ 13:08)  POCT Blood Glucose.: 143 mg/dL (12-26-23 @ 11:17)  POCT Blood Glucose.: 188 mg/dL (12-26-23 @ 09:55)  POCT Blood Glucose.: 168 mg/dL (12-26-23 @ 09:10)  POCT Blood Glucose.: 160 mg/dL (12-26-23 @ 08:06)  POCT Blood Glucose.: 157 mg/dL (12-26-23 @ 06:45)  POCT Blood Glucose.: 170 mg/dL (12-26-23 @ 05:47)  POCT Blood Glucose.: 179 mg/dL (12-26-23 @ 05:05)  POCT Blood Glucose.: 160 mg/dL (12-26-23 @ 03:52)  POCT Blood Glucose.: 97 mg/dL (12-26-23 @ 02:46)  POCT Blood Glucose.: 104 mg/dL (12-26-23 @ 01:57)  POCT Blood Glucose.: 114 mg/dL (12-26-23 @ 00:53)  POCT Blood Glucose.: 122 mg/dL (12-25-23 @ 23:56)  POCT Blood Glucose.: 131 mg/dL (12-25-23 @ 22:56)  POCT Blood Glucose.: 150 mg/dL (12-25-23 @ 21:58)  POCT Blood Glucose.: 147 mg/dL (12-25-23 @ 20:49)  POCT Blood Glucose.: 146 mg/dL (12-25-23 @ 19:52)  POCT Blood Glucose.: 159 mg/dL (12-25-23 @ 18:45)  POCT Blood Glucose.: 163 mg/dL (12-25-23 @ 18:11)  POCT Blood Glucose.: 181 mg/dL (12-25-23 @ 16:51)  POCT Blood Glucose.: 188 mg/dL (12-25-23 @ 15:47)  POCT Blood Glucose.: 208 mg/dL (12-25-23 @ 14:49)  POCT Blood Glucose.: 199 mg/dL (12-25-23 @ 13:54)  POCT Blood Glucose.: 215 mg/dL (12-25-23 @ 12:53)  POCT Blood Glucose.: 218 mg/dL (12-25-23 @ 11:58)  POCT Blood Glucose.: 215 mg/dL (12-25-23 @ 10:55)                          8.8    10.65 )-----------( 150      ( 28 Dec 2023 00:58 )             27.2     12-28    136  |  104  |  32<H>  ----------------------------<  192<H>  3.7   |  21<L>  |  1.10    Ca    8.7      28 Dec 2023 00:57  Phos  2.3     12-28  Mg     2.0     12-28    TPro  5.8<L>  /  Alb  3.8  /  TBili  0.3  /  DBili  x   /  AST  34  /  ALT  47<H>  /  AlkPhos  45  12-28    eGFR: 77 mL/min/1.73m2 (28 Dec 2023 00:57)    11-10 Chol 130 Direct LDL -- LDL calculated 75 HDL 37<L> Trig 95, 11-03 Chol 198 Direct LDL -- LDL calculated 114<H> HDL 24<L> Trig 344<H>  Thyroid Function Tests:      A1C with Estimated Average Glucose Result: 8.3 % (11-01-23 @ 06:14)    Estimated Average Glucose: 192 mg/dL (11-01-23 @ 06:14)        Diet, Consistent Carbohydrate/No Snacks (12-27-23 @ 11:00) [Active]              seen earlier today     Chief Complaint: Diabetes Mellitus follow up    INTERVAL HX: patient confirmed home DM med of farxiga 10mg po qd , patient denies any other DM meds, reports he has not taken ozempic for over 4 months, patient endorses poor appetite and low po intake , insulin gtt requirements have increased, steroids taper per order       Review of Systems:  General: As above  GI: No nausea, vomiting  Endocrine: no  S&Sx of hypoglycemia    Allergies    penicillins (Unknown)    Intolerances      MEDICATIONS  (STANDING):  bisacodyl Suppository 10 milliGRAM(s) Rectal once  budesonide  80 MICROgram(s)/formoterol 4.5 MICROgram(s) Inhaler 1 Puff(s) Inhalation two times a day  buMETAnide Injectable 1 milliGRAM(s) IV Push every 12 hours  chlorhexidine 4% Liquid 1 Application(s) Topical <User Schedule>  dextrose 50% Injectable 50 milliLiter(s) IV Push every 15 minutes  gabapentin 100 milliGRAM(s) Oral every 8 hours  heparin   Injectable 5000 Unit(s) SubCutaneous every 8 hours  hydrALAZINE 50 milliGRAM(s) Oral every 8 hours  insulin regular Infusion 3 Unit(s)/Hr (3 mL/Hr) IV Continuous <Continuous>  lidocaine   4% Patch 3 Patch Transdermal daily  methocarbamol 500 milliGRAM(s) Oral every 8 hours  methylPREDNISolone sodium succinate Injectable   IV Push   methylPREDNISolone sodium succinate Injectable 32 milliGRAM(s) IV Push every 12 hours  metoclopramide Injectable 10 milliGRAM(s) IV Push every 8 hours  milrinone Infusion 0.125 MICROgram(s)/kG/Min (3.71 mL/Hr) IV Continuous <Continuous>  mupirocin 2% Ointment 1 Application(s) Topical two times a day  mycophenolate mofetil IVPB 1000 milliGRAM(s) IV Intermittent <User Schedule>  naloxegol 25 milliGRAM(s) Oral daily  niCARdipine Infusion 3 mG/Hr (15 mL/Hr) IV Continuous <Continuous>  nystatin    Suspension 410065 Unit(s) Oral <User Schedule>  pantoprazole  Injectable 40 milliGRAM(s) IV Push daily  polyethylene glycol 3350 17 Gram(s) Oral daily  senna 2 Tablet(s) Oral at bedtime  simethicone 160 milliGRAM(s) Chew every 8 hours  sodium chloride 0.9%. 1000 milliLiter(s) (10 mL/Hr) IV Continuous <Continuous>  tacrolimus 2 milliGRAM(s) Oral <User Schedule>  traMADol 25 milliGRAM(s) Oral every 8 hours        insulin regular Infusion   3 mL/Hr IV Continuous (12-28-23 @ 00:40)    methylPREDNISolone sodium succinate Injectable   36 milliGRAM(s) IV Push (12-28-23 @ 05:16)   36 milliGRAM(s) IV Push (12-27-23 @ 17:21)        PHYSICAL EXAM:  VITALS: T(C): 36.1 (12-28-23 @ 08:00)  T(F): 97 (12-28-23 @ 08:00), Max: 98 (12-27-23 @ 12:00)  HR: 80 (12-28-23 @ 10:00) (79 - 92)  BP: --  RR:  (12 - 33)  SpO2:  (91% - 97%)  Wt(kg): --  GENERAL: NAD, msi purple foam cdi, CTx4, holt , right neck central line   Respiratory: Respirations unlabored   Extremities: Warm, no edema  NEURO: Alert , appropriate     LABS:  POCT Blood Glucose.: 110 mg/dL (12-28-23 @ 10:12)  POCT Blood Glucose.: 120 mg/dL (12-28-23 @ 09:14)  POCT Blood Glucose.: 109 mg/dL (12-28-23 @ 08:09)  POCT Blood Glucose.: 139 mg/dL (12-28-23 @ 06:52)  POCT Blood Glucose.: 145 mg/dL (12-28-23 @ 06:08)  POCT Blood Glucose.: 152 mg/dL (12-28-23 @ 05:10)  POCT Blood Glucose.: 156 mg/dL (12-28-23 @ 04:02)  POCT Blood Glucose.: 185 mg/dL (12-28-23 @ 02:52)  POCT Blood Glucose.: 177 mg/dL (12-28-23 @ 01:49)  POCT Blood Glucose.: 185 mg/dL (12-28-23 @ 00:55)  POCT Blood Glucose.: 198 mg/dL (12-28-23 @ 00:11)  POCT Blood Glucose.: 210 mg/dL (12-27-23 @ 22:51)  POCT Blood Glucose.: 218 mg/dL (12-27-23 @ 22:02)  POCT Blood Glucose.: 179 mg/dL (12-27-23 @ 20:40)  POCT Blood Glucose.: 143 mg/dL (12-27-23 @ 18:47)  POCT Blood Glucose.: 231 mg/dL (12-27-23 @ 17:50)  POCT Blood Glucose.: 224 mg/dL (12-27-23 @ 17:04)  POCT Blood Glucose.: 146 mg/dL (12-27-23 @ 15:12)  POCT Blood Glucose.: 187 mg/dL (12-27-23 @ 13:56)  POCT Blood Glucose.: 134 mg/dL (12-27-23 @ 13:19)  POCT Blood Glucose.: 213 mg/dL (12-27-23 @ 11:54)  POCT Blood Glucose.: 178 mg/dL (12-27-23 @ 10:51)  POCT Blood Glucose.: 194 mg/dL (12-27-23 @ 09:48)  POCT Blood Glucose.: 79 mg/dL (12-27-23 @ 08:51)  POCT Blood Glucose.: 109 mg/dL (12-27-23 @ 07:57)  POCT Blood Glucose.: 122 mg/dL (12-27-23 @ 06:41)  POCT Blood Glucose.: 131 mg/dL (12-27-23 @ 05:53)  POCT Blood Glucose.: 137 mg/dL (12-27-23 @ 04:46)  POCT Blood Glucose.: 139 mg/dL (12-27-23 @ 03:52)  POCT Blood Glucose.: 160 mg/dL (12-27-23 @ 03:02)  POCT Blood Glucose.: 159 mg/dL (12-27-23 @ 02:00)  POCT Blood Glucose.: 197 mg/dL (12-27-23 @ 00:42)  POCT Blood Glucose.: 207 mg/dL (12-26-23 @ 23:52)  POCT Blood Glucose.: 205 mg/dL (12-26-23 @ 23:07)  POCT Blood Glucose.: 172 mg/dL (12-26-23 @ 22:03)  POCT Blood Glucose.: 185 mg/dL (12-26-23 @ 20:48)  POCT Blood Glucose.: 191 mg/dL (12-26-23 @ 19:48)  POCT Blood Glucose.: 148 mg/dL (12-26-23 @ 18:42)  POCT Blood Glucose.: 95 mg/dL (12-26-23 @ 17:48)  POCT Blood Glucose.: 101 mg/dL (12-26-23 @ 16:44)  POCT Blood Glucose.: 103 mg/dL (12-26-23 @ 15:57)  POCT Blood Glucose.: 111 mg/dL (12-26-23 @ 14:52)  POCT Blood Glucose.: 118 mg/dL (12-26-23 @ 14:09)  POCT Blood Glucose.: 129 mg/dL (12-26-23 @ 13:08)  POCT Blood Glucose.: 143 mg/dL (12-26-23 @ 11:17)  POCT Blood Glucose.: 188 mg/dL (12-26-23 @ 09:55)  POCT Blood Glucose.: 168 mg/dL (12-26-23 @ 09:10)  POCT Blood Glucose.: 160 mg/dL (12-26-23 @ 08:06)  POCT Blood Glucose.: 157 mg/dL (12-26-23 @ 06:45)  POCT Blood Glucose.: 170 mg/dL (12-26-23 @ 05:47)  POCT Blood Glucose.: 179 mg/dL (12-26-23 @ 05:05)  POCT Blood Glucose.: 160 mg/dL (12-26-23 @ 03:52)  POCT Blood Glucose.: 97 mg/dL (12-26-23 @ 02:46)  POCT Blood Glucose.: 104 mg/dL (12-26-23 @ 01:57)  POCT Blood Glucose.: 114 mg/dL (12-26-23 @ 00:53)  POCT Blood Glucose.: 122 mg/dL (12-25-23 @ 23:56)  POCT Blood Glucose.: 131 mg/dL (12-25-23 @ 22:56)  POCT Blood Glucose.: 150 mg/dL (12-25-23 @ 21:58)  POCT Blood Glucose.: 147 mg/dL (12-25-23 @ 20:49)  POCT Blood Glucose.: 146 mg/dL (12-25-23 @ 19:52)  POCT Blood Glucose.: 159 mg/dL (12-25-23 @ 18:45)  POCT Blood Glucose.: 163 mg/dL (12-25-23 @ 18:11)  POCT Blood Glucose.: 181 mg/dL (12-25-23 @ 16:51)  POCT Blood Glucose.: 188 mg/dL (12-25-23 @ 15:47)  POCT Blood Glucose.: 208 mg/dL (12-25-23 @ 14:49)  POCT Blood Glucose.: 199 mg/dL (12-25-23 @ 13:54)  POCT Blood Glucose.: 215 mg/dL (12-25-23 @ 12:53)  POCT Blood Glucose.: 218 mg/dL (12-25-23 @ 11:58)  POCT Blood Glucose.: 215 mg/dL (12-25-23 @ 10:55)                          8.8    10.65 )-----------( 150      ( 28 Dec 2023 00:58 )             27.2     12-28    136  |  104  |  32<H>  ----------------------------<  192<H>  3.7   |  21<L>  |  1.10    Ca    8.7      28 Dec 2023 00:57  Phos  2.3     12-28  Mg     2.0     12-28    TPro  5.8<L>  /  Alb  3.8  /  TBili  0.3  /  DBili  x   /  AST  34  /  ALT  47<H>  /  AlkPhos  45  12-28    eGFR: 77 mL/min/1.73m2 (28 Dec 2023 00:57)    11-10 Chol 130 Direct LDL -- LDL calculated 75 HDL 37<L> Trig 95, 11-03 Chol 198 Direct LDL -- LDL calculated 114<H> HDL 24<L> Trig 344<H>  Thyroid Function Tests:      A1C with Estimated Average Glucose Result: 8.3 % (11-01-23 @ 06:14)    Estimated Average Glucose: 192 mg/dL (11-01-23 @ 06:14)        Diet, Consistent Carbohydrate/No Snacks (12-27-23 @ 11:00) [Active]

## 2023-12-28 NOTE — OCCUPATIONAL THERAPY INITIAL EVALUATION ADULT - ADL RETRAINING, OT EVAL
Patient will dress lower body (I), AE as needed in 4 weeks
Patient will dress lower body (I), AE as needed in 4 weeks. Patient will dress upper body (I) in 4 weeks

## 2023-12-28 NOTE — OCCUPATIONAL THERAPY INITIAL EVALUATION ADULT - GENERAL OBSERVATIONS, REHAB EVAL
Patient received semi-supine in bed, +tele, pulse ox, +IV, a-line, IADP
Patient received in bedside chair, +tele, pulse ox, +IV, triple lumen, a-line, chest tube x 4, holt, external pacemaker, HFNC, sequentials donned

## 2023-12-28 NOTE — PROGRESS NOTE ADULT - SUBJECTIVE AND OBJECTIVE BOX
INFECTIOUS DISEASES FOLLOW UP-    This is a follow up note for this  58yMale with  Non-ST elevation myocardial infarction (NSTEMI)    s/p OHTx on 12/25 and IABP removal on 12/26  on nasal cannula  today  no new complaints        Vital Signs Last 24 Hrs  T(C): 36.1 (28 Dec 2023 16:00), Max: 36.4 (27 Dec 2023 20:00)  T(F): 97 (28 Dec 2023 16:00), Max: 97.6 (27 Dec 2023 20:00)  HR: 87 (28 Dec 2023 17:30) (79 - 87)  BP: --  BP(mean): --  RR: 26 (28 Dec 2023 17:30) (12 - 33)  SpO2: 77% (28 Dec 2023 17:30) (77% - 99%)    Parameters below as of 28 Dec 2023 16:00  Patient On (Oxygen Delivery Method): nasal cannula  O2 Flow (L/min): 6        24 hours:    12-27 @ 07:01  -  12-28 @ 07:00  --------------------------------------------------------  OUT:    Chest Tube (mL): 75 mL    Chest Tube (mL): 190 mL    Chest Tube (mL): 120 mL    Chest Tube (mL): 65 mL    Dexmedetomidine: 0 mL    Indwelling Catheter - Urethral (mL): 4100 mL  Total OUT: 4550 mL              PHYSICAL EXAM:  Constitutional:no acute distress, sedated intubated  Eyes:MARVEL, EOMI  Ear/Nose/Throat: no oral lesions, right CVC/martin	  Respiratory: clear BL  chest tubes x4  Cardiovascular: S1S2 sternotomy dressing CDI  Gastrointestinal:soft, (+) BS, no tenderness  Extremities:no e/e/c  IABP femoral line remains in place  No Lymphadenopathy  IV sites not inflammed.  4 chest tubes with bloody fluid      ________    ____________________________________________________  ROS  GENERAL: denies chills, , night sweats, weight loss.   PSYCH: denies depression, anxiety, suicidal ideation, hallucination, and delusions  SKIN: no rash or lesions; no color changes, no abnormal nevi,no  dryness, and nojaundice    EYES: denies visual changes, floaters, pain, inflammation, blurred vision, and discharge  ENT: denies tinnitus, vertigo, epistaxis, oral lesion, and decreased acuity  PULM: denies, hemoptysis, pleurisy  CVS: denies angina, palpitations,+ orthopnea, no syncope, or heart murmur  GI: denies constipation, diarrhea, melena, abdominal pain, nausea.   : denies dysuria, frequency, discharge, incontinence, stones or macroscopic hematuria  MS: no arthralgias, no erythema or swelling, no myalgias, noedema, or lower back pain.   CNS: denies numbness, dizziness, seizure, or tremor  ENDO: denies heat/cold intolerance, polyuria, polydipsia, malaise.    HEME: denies bruising, bleeding, lymphadenopathy, anemia, and calf pain    Allergies  penicillins (Unknown)    __________________________________________________  MEDS:  MEDICATIONS  (STANDING):  acetaminophen   IVPB .. 1000 every 8 hours  budesonide  80 MICROgram(s)/formoterol 4.5 MICROgram(s) Inhaler 1 two times a day  buMETAnide Injectable 1 every 12 hours  dextrose 50% Injectable 50 every 15 minutes  heparin   Injectable 5000 every 8 hours  hydrALAZINE 50 every 8 hours  insulin regular Infusion 3 <Continuous>  methocarbamol 500 every 8 hours  methylnaltrexone Injectable 12 every other day  methylPREDNISolone sodium succinate Injectable    methylPREDNISolone sodium succinate Injectable 32 every 12 hours  metoclopramide Injectable 10 every 8 hours  milrinone Infusion 0.125 <Continuous>  mycophenolate mofetil 1000 every 12 hours  naloxegol 25 daily  niCARdipine Infusion 3 <Continuous>  pantoprazole  Injectable 40 daily  polyethylene glycol 3350 17 daily  pregabalin 50 every 8 hours  senna 2 at bedtime  simethicone 160 every 8 hours  tacrolimus 4 <User Schedule>  traMADol 25 every 8 hours    _________________________________________________  ANTIMICOBIALS  mycophenolate mofetil 1000 every 12 hours  nystatin    Suspension [id number] <User Schedule>  tacrolimus 4 <User Schedule>      GENERAL LABS              8.8                  x    | x    | x            10.65 >-----------< 150     ------------------------< x                     27.2                 x    | x    | x                                            Ca x     Mg x     Ph x          Urinalysis Basic - ( 28 Dec 2023 00:57 )    Color: x / Appearance: x / SG: x / pH: x  Gluc: 192 mg/dL / Ketone: x  / Bili: x / Urobili: x   Blood: x / Protein: x / Nitrite: x   Leuk Esterase: x / RBC: x / WBC x   Sq Epi: x / Non Sq Epi: x / Bacteria: x        _________________________________________________  MICROBIOLOGY  -----------    Culture - Body Fluid with Gram Stain (collected 25 Dec 2023 15:28)  Source: .Body Fluid procurement fluid  Gram Stain (25 Dec 2023 21:08):    No polymorphonuclear cells seen    No organisms seen    by cytocentrifuge  Preliminary Report (26 Dec 2023 20:02):    No growth            Rapid RVP Result: Sherry (12-24 @ 15:09)          Fungitell:   _______________________________________________  PERTINENT IMAGING     INFECTIOUS DISEASES FOLLOW UP-    This is a follow up note for this  58yMale with  Non-ST elevation myocardial infarction (NSTEMI)    s/p OHTx on 12/25 and IABP removal on 12/26  on nasal cannula  today  no new complaints        Vital Signs Last 24 Hrs  T(C): 36.1 (28 Dec 2023 16:00), Max: 36.4 (27 Dec 2023 20:00)  T(F): 97 (28 Dec 2023 16:00), Max: 97.6 (27 Dec 2023 20:00)  HR: 87 (28 Dec 2023 17:30) (79 - 87)  BP: --  BP(mean): --  RR: 26 (28 Dec 2023 17:30) (12 - 33)  SpO2: 77% (28 Dec 2023 17:30) (77% - 99%)    Parameters below as of 28 Dec 2023 16:00  Patient On (Oxygen Delivery Method): nasal cannula  O2 Flow (L/min): 6        24 hours:    12-27 @ 07:01  -  12-28 @ 07:00  --------------------------------------------------------  OUT:    Chest Tube (mL): 75 mL    Chest Tube (mL): 190 mL    Chest Tube (mL): 120 mL    Chest Tube (mL): 65 mL    Dexmedetomidine: 0 mL    Indwelling Catheter - Urethral (mL): 4100 mL  Total OUT: 4550 mL              PHYSICAL EXAM:  Constitutional:no acute distress, sedated intubated  Eyes:MARVEL, EOMI  Ear/Nose/Throat: no oral lesions, right CVC/martin	  Respiratory: clear BL  chest tubes x4  Cardiovascular: S1S2 sternotomy dressing CDI  Gastrointestinal:soft, (+) BS, no tenderness  Extremities:no e/e/c  IABP femoral line remains in place  No Lymphadenopathy  IV sites not inflammed.  4 chest tubes with bloody fluid      ________    ____________________________________________________  ROS  GENERAL: denies chills, , night sweats, weight loss.   PSYCH: denies depression, anxiety, suicidal ideation, hallucination, and delusions  SKIN: no rash or lesions; no color changes, no abnormal nevi,no  dryness, and nojaundice    EYES: denies visual changes, floaters, pain, inflammation, blurred vision, and discharge  ENT: denies tinnitus, vertigo, epistaxis, oral lesion, and decreased acuity  PULM: denies, hemoptysis, pleurisy  CVS: denies angina, palpitations,+ orthopnea, no syncope, or heart murmur  GI: denies constipation, diarrhea, melena, abdominal pain, nausea.   : denies dysuria, frequency, discharge, incontinence, stones or macroscopic hematuria  MS: no arthralgias, no erythema or swelling, no myalgias, noedema, or lower back pain.   CNS: denies numbness, dizziness, seizure, or tremor  ENDO: denies heat/cold intolerance, polyuria, polydipsia, malaise.    HEME: denies bruising, bleeding, lymphadenopathy, anemia, and calf pain    Allergies  penicillins (Unknown)    __________________________________________________  MEDS:  MEDICATIONS  (STANDING):  acetaminophen   IVPB .. 1000 every 8 hours  budesonide  80 MICROgram(s)/formoterol 4.5 MICROgram(s) Inhaler 1 two times a day  buMETAnide Injectable 1 every 12 hours  dextrose 50% Injectable 50 every 15 minutes  heparin   Injectable 5000 every 8 hours  hydrALAZINE 50 every 8 hours  insulin regular Infusion 3 <Continuous>  methocarbamol 500 every 8 hours  methylnaltrexone Injectable 12 every other day  methylPREDNISolone sodium succinate Injectable    methylPREDNISolone sodium succinate Injectable 32 every 12 hours  metoclopramide Injectable 10 every 8 hours  milrinone Infusion 0.125 <Continuous>  mycophenolate mofetil 1000 every 12 hours  naloxegol 25 daily  niCARdipine Infusion 3 <Continuous>  pantoprazole  Injectable 40 daily  polyethylene glycol 3350 17 daily  pregabalin 50 every 8 hours  senna 2 at bedtime  simethicone 160 every 8 hours  tacrolimus 4 <User Schedule>  traMADol 25 every 8 hours    _________________________________________________  ANTIMICOBIALS  mycophenolate mofetil 1000 every 12 hours  nystatin    Suspension [id number] <User Schedule>  tacrolimus 4 <User Schedule>      GENERAL LABS              8.8                  x    | x    | x            10.65 >-----------< 150     ------------------------< x                     27.2                 x    | x    | x                                            Ca x     Mg x     Ph x          Urinalysis Basic - ( 28 Dec 2023 00:57 )    Color: x / Appearance: x / SG: x / pH: x  Gluc: 192 mg/dL / Ketone: x  / Bili: x / Urobili: x   Blood: x / Protein: x / Nitrite: x   Leuk Esterase: x / RBC: x / WBC x   Sq Epi: x / Non Sq Epi: x / Bacteria: x        _________________________________________________  MICROBIOLOGY  -----------    Culture - Body Fluid with Gram Stain (collected 25 Dec 2023 15:28)  Source: .Body Fluid procurement fluid  Gram Stain (25 Dec 2023 21:08):    No polymorphonuclear cells seen    No organisms seen    by cytocentrifuge  Preliminary Report (26 Dec 2023 20:02):    No growth            Rapid RVP Result: Sherry (12-24 @ 15:09)          Fungitell:   _______________________________________________  PERTINENT IMAGING

## 2023-12-28 NOTE — PROGRESS NOTE ADULT - ASSESSMENT
57 yo male h/o htn, cad s/p pci, ICH, here with NSTEMI  s/p intubation and cath. now in CCU    NSTEMI  s/p  cath  cath results noted. multi vessel dz., CTSx f/u.    pt with vtach/fib arrest again on 11/8. s/p ACLS and ROSC.   in ccu with iabp   plan for transplant as per HF and transplant team  transplant w/u ongoing.   s/p colonoscopy 11/17. normal  now listed for transplant as of 11/22 12/25: pt s/p heart transplant last night. remains intubated with iabp in CTU.   12/26: pt extubated to high flow this am. IABP currently being removed. pt a0x3.   12/27: pt feels well. walked with PT this am. currently comforable sitting in chair. IABP removed. wean off pressors as able.   12/28: no acute events o/n. weaning down ionotropes/pressors as able. immunosuppressives as per transplant team. PT    mngt as per CTU team          Advanced care planning was discussed with patient and family.  Advanced care planning forms were reviewed and discussed as appropriate.  Differential diagnosis and plan of care discussed with patient after the evaluation.   Pain assessed and judicious use of narcotics when appropriate was discussed.  Importance of Fall prevention discussed.  Counseling on Smoking and Alcohol cessation was offered when appropriate.  Counseling on Diet, exercise, and medication compliance was done.       Approx 75 minutes spent. 59 yo male h/o htn, cad s/p pci, ICH, here with NSTEMI  s/p intubation and cath. now in CCU    NSTEMI  s/p  cath  cath results noted. multi vessel dz., CTSx f/u.    pt with vtach/fib arrest again on 11/8. s/p ACLS and ROSC.   in ccu with iabp   plan for transplant as per HF and transplant team  transplant w/u ongoing.   s/p colonoscopy 11/17. normal  now listed for transplant as of 11/22 12/25: pt s/p heart transplant last night. remains intubated with iabp in CTU.   12/26: pt extubated to high flow this am. IABP currently being removed. pt a0x3.   12/27: pt feels well. walked with PT this am. currently comforable sitting in chair. IABP removed. wean off pressors as able.   12/28: no acute events o/n. weaning down ionotropes/pressors as able. immunosuppressives as per transplant team. PT    mngt as per CTU team          Advanced care planning was discussed with patient and family.  Advanced care planning forms were reviewed and discussed as appropriate.  Differential diagnosis and plan of care discussed with patient after the evaluation.   Pain assessed and judicious use of narcotics when appropriate was discussed.  Importance of Fall prevention discussed.  Counseling on Smoking and Alcohol cessation was offered when appropriate.  Counseling on Diet, exercise, and medication compliance was done.       Approx 75 minutes spent.

## 2023-12-28 NOTE — OCCUPATIONAL THERAPY INITIAL EVALUATION ADULT - TRANSFER TRAINING, PT EVAL
Patient will transfer to toilet with DME as needed (I) in 4 weeks
Patient will transfer to toilet with DME as needed (I) in 4 weeks

## 2023-12-28 NOTE — OCCUPATIONAL THERAPY INITIAL EVALUATION ADULT - HOME MANAGEMENT SKILLS, PREVIOUS LEVEL OF FUNCTION, OT EVAL
Persistent cough; will evaluate CXR and medication. Pleuritic CP likely secondary to irriation due to cough. Will be evaluated with EKG and D-dimer. independent

## 2023-12-28 NOTE — PROGRESS NOTE ADULT - SUBJECTIVE AND OBJECTIVE BOX
ADVANCED HEART FAILURE & TRANSPLANT  - PROGRESS NOTE  *To reach the NS1 Team from 8am to 5pm (MON-FRI), please call [phone],   _______________________________________________________________________________________________________     Subjective:  - Currently on Milrinone 0.125mcg  - tsering cisse removed yesterday. Remains on Cardene gtts  - MICHELLE in chair today      Medications:  bisacodyl Suppository 10 milliGRAM(s) Rectal once  budesonide  80 MICROgram(s)/formoterol 4.5 MICROgram(s) Inhaler 1 Puff(s) Inhalation two times a day  buMETAnide Injectable 1 milliGRAM(s) IV Push every 12 hours  chlorhexidine 4% Liquid 1 Application(s) Topical <User Schedule>  dextrose 50% Injectable 50 milliLiter(s) IV Push every 15 minutes  heparin   Injectable 5000 Unit(s) SubCutaneous every 8 hours  hydrALAZINE 50 milliGRAM(s) Oral every 8 hours  insulin regular Infusion 3 Unit(s)/Hr IV Continuous <Continuous>  lidocaine   4% Patch 3 Patch Transdermal daily  methocarbamol 500 milliGRAM(s) Oral every 8 hours  methylPREDNISolone sodium succinate Injectable   IV Push   methylPREDNISolone sodium succinate Injectable 32 milliGRAM(s) IV Push every 12 hours  metoclopramide Injectable 10 milliGRAM(s) IV Push every 8 hours  milrinone Infusion 0.125 MICROgram(s)/kG/Min IV Continuous <Continuous>  mupirocin 2% Ointment 1 Application(s) Topical two times a day  mycophenolate mofetil IVPB 1000 milliGRAM(s) IV Intermittent <User Schedule>  naloxegol 25 milliGRAM(s) Oral daily  niCARdipine Infusion 3 mG/Hr IV Continuous <Continuous>  nystatin    Suspension [id number] Unit(s) Oral <User Schedule>  pantoprazole  Injectable 40 milliGRAM(s) IV Push daily  polyethylene glycol 3350 17 Gram(s) Oral daily  pregabalin 50 milliGRAM(s) Oral every 8 hours  senna 2 Tablet(s) Oral at bedtime  simethicone 160 milliGRAM(s) Chew every 8 hours  sodium chloride 0.9%. 1000 milliLiter(s) IV Continuous <Continuous>  tacrolimus 2 milliGRAM(s) Oral <User Schedule>  traMADol 25 milliGRAM(s) Oral every 8 hours      Physical Exam:    Vitals:  Vital Signs Last 24 Hours  T(C): 36.1 (23 @ 12:00), Max: 36.6 (23 @ 16:00)  HR: 81 (23 @ 12:30) (79 - 90)  MAP: 77-83  RR: 15 (23 @ 12:30) (12 - 33)  SpO2: 94% (23 @ 12:30) (91% - 96%)    Weight in k.1 ( @ 00:00)    I&O's Summary    27 Dec 2023 07:  -  28 Dec 2023 07:00  --------------------------------------------------------  IN: 2927.3 mL / OUT: 4550 mL / NET: -1622.7 mL    28 Dec 2023 07:01  -  28 Dec 2023 13:30  --------------------------------------------------------  IN: 466.1 mL / OUT: 1130 mL / NET: -663.9 mL    Tele: SR 80's    General: No distress. Comfortable.  HEENT: EOM intact.  Neck: Neck supple. JVP not elevated. No masses  Chest: Clear to auscultation bilaterally  CV: Normal S1 and S2. No murmurs, rub, or gallops. Radial pulses normal  Abdomen: Soft, non-distended, non-tender  Skin: No rashes or skin breakdown  Extremities: No LE edema  Neurology: Alert and oriented times three. Sensation intact  Psych: Affect normal    Labs:                        8.8    10.65 )-----------( 150      ( 28 Dec 2023 00:58 )             27.2         136  |  104  |  32<H>  ----------------------------<  192<H>  3.7   |  21<L>  |  1.10    Ca    8.7      28 Dec 2023 00:57  Phos  2.3       Mg     2.0         TPro  5.8<L>  /  Alb  3.8  /  TBili  0.3  /  DBili  x   /  AST  34  /  ALT  47<H>  /  AlkPhos  45      PT/INR - ( 28 Dec 2023 00:58 )   PT: 12.8 sec;   INR: 1.17 ratio    PTT - ( 28 Dec 2023 00:58 )  PTT:26.6 sec    Oxygen Saturation, Mixed: 67.7 ( @ 09:50)  Oxygen Saturation, Mixed: 69.6 ( @ 03:56)  Oxygen Saturation, Mixed: 74.9 ( @ 00:00)  Oxygen Saturation, Mixed: 69.5 ( @ 19:58)  Oxygen Saturation, Mixed: 74.5 ( @ 18:45)  Oxygen Saturation, Mixed: 76.3 ( @ 16:50)  Oxygen Saturation, Mixed: 78.6 ( @ 14:10)  Oxygen Saturation, Mixed: 82.1 ( @ 11:55)  Oxygen Saturation, Mixed: 84.5 ( @ 08:05)  Oxygen Saturation, Mixed: 80.2 ( @ 00:00)  Oxygen Saturation, Mixed: 74.0 ( @ 19:57)  Oxygen Saturation, Mixed: 71.7 ( @ 15:40)  Oxygen Saturation, Mixed: 74.1 ( @ 14:35)         ADVANCED HEART FAILURE & TRANSPLANT  - PROGRESS NOTE  *To reach the NS1 Team from 8am to 5pm (MON-FRI), please call [phone],   _______________________________________________________________________________________________________     Subjective:  - Currently on Milrinone 0.125mcg  - tsering cisse removed yesterday. Remains on Cardene gtts  - MICHELLE in chair today      Medications:  bisacodyl Suppository 10 milliGRAM(s) Rectal once  budesonide  80 MICROgram(s)/formoterol 4.5 MICROgram(s) Inhaler 1 Puff(s) Inhalation two times a day  buMETAnide Injectable 1 milliGRAM(s) IV Push every 12 hours  chlorhexidine 4% Liquid 1 Application(s) Topical <User Schedule>  dextrose 50% Injectable 50 milliLiter(s) IV Push every 15 minutes  heparin   Injectable 5000 Unit(s) SubCutaneous every 8 hours  hydrALAZINE 50 milliGRAM(s) Oral every 8 hours  insulin regular Infusion 3 Unit(s)/Hr IV Continuous <Continuous>  lidocaine   4% Patch 3 Patch Transdermal daily  methocarbamol 500 milliGRAM(s) Oral every 8 hours  methylPREDNISolone sodium succinate Injectable   IV Push   methylPREDNISolone sodium succinate Injectable 32 milliGRAM(s) IV Push every 12 hours  metoclopramide Injectable 10 milliGRAM(s) IV Push every 8 hours  milrinone Infusion 0.125 MICROgram(s)/kG/Min IV Continuous <Continuous>  mupirocin 2% Ointment 1 Application(s) Topical two times a day  mycophenolate mofetil IVPB 1000 milliGRAM(s) IV Intermittent <User Schedule>  naloxegol 25 milliGRAM(s) Oral daily  niCARdipine Infusion 3 mG/Hr IV Continuous <Continuous>  nystatin    Suspension [id number] Unit(s) Oral <User Schedule>  pantoprazole  Injectable 40 milliGRAM(s) IV Push daily  polyethylene glycol 3350 17 Gram(s) Oral daily  pregabalin 50 milliGRAM(s) Oral every 8 hours  senna 2 Tablet(s) Oral at bedtime  simethicone 160 milliGRAM(s) Chew every 8 hours  sodium chloride 0.9%. 1000 milliLiter(s) IV Continuous <Continuous>  tacrolimus 2 milliGRAM(s) Oral <User Schedule>  traMADol 25 milliGRAM(s) Oral every 8 hours      Physical Exam:    Vitals:  Vital Signs Last 24 Hours  T(C): 36.1 (23 @ 12:00), Max: 36.6 (23 @ 16:00)  HR: 81 (23 @ 12:30) (79 - 90)  MAP: 77-83  RR: 15 (23 @ 12:30) (12 - 33)  SpO2: 94% (23 @ 12:30) (91% - 96%)    Weight in k.1 ( @ 00:00)    I&O's Summary    27 Dec 2023 07:  -  28 Dec 2023 07:00  --------------------------------------------------------  IN: 2927.3 mL / OUT: 4550 mL / NET: -1622.7 mL    28 Dec 2023 07:01  -  28 Dec 2023 13:30  --------------------------------------------------------  IN: 466.1 mL / OUT: 1130 mL / NET: -663.9 mL    Tele: SR 80's    General: No distress. Comfortable.  HEENT: EOM intact.  Neck: Neck supple. JVP not elevated. No masses  Chest: Clear to auscultation bilaterally  CV: Normal S1 and S2. No murmurs, rub, or gallops. Radial pulses normal  Abdomen: Soft, non-distended, non-tender  Skin: No rashes or skin breakdown  Extremities: No LE edema  Neurology: Alert and oriented times three. Sensation intact  Psych: Affect normal    Labs:                        8.8    10.65 )-----------( 150      ( 28 Dec 2023 00:58 )             27.2         136  |  104  |  32<H>  ----------------------------<  192<H>  3.7   |  21<L>  |  1.10    Ca    8.7      28 Dec 2023 00:57  Phos  2.3       Mg     2.0         TPro  5.8<L>  /  Alb  3.8  /  TBili  0.3  /  DBili  x   /  AST  34  /  ALT  47<H>  /  AlkPhos  45      PT/INR - ( 28 Dec 2023 00:58 )   PT: 12.8 sec;   INR: 1.17 ratio    PTT - ( 28 Dec 2023 00:58 )  PTT:26.6 sec    Oxygen Saturation, Mixed: 67.7 ( @ 09:50)  Oxygen Saturation, Mixed: 69.6 ( @ 03:56)  Oxygen Saturation, Mixed: 74.9 ( @ 00:00)  Oxygen Saturation, Mixed: 69.5 ( @ 19:58)  Oxygen Saturation, Mixed: 74.5 ( @ 18:45)  Oxygen Saturation, Mixed: 76.3 ( @ 16:50)  Oxygen Saturation, Mixed: 78.6 ( @ 14:10)  Oxygen Saturation, Mixed: 82.1 ( @ 11:55)  Oxygen Saturation, Mixed: 84.5 ( @ 08:05)  Oxygen Saturation, Mixed: 80.2 ( @ 00:00)  Oxygen Saturation, Mixed: 74.0 ( @ 19:57)  Oxygen Saturation, Mixed: 71.7 ( @ 15:40)  Oxygen Saturation, Mixed: 74.1 ( @ 14:35)

## 2023-12-28 NOTE — PROGRESS NOTE ADULT - ASSESSMENT
57 yo M with HFrEF (LVIDd 6.4 cm, LVEF 32%), CAD s/p PCI (2008), HTN, DMT2 (A1c 8.3%) and CVA s/p TPA (2018), recently treated for PNA who initially presented to [facility] via EMS after syncope reportedly requiring defibrillation. Treated for ACS but left AMA to come to [facility]. On arrival ECG with ST depression in lateral leads. Intubated 11/1 for respiratory failure and was found to have COVID. L/RHC 11/1revealing  of LAD and RCA, elevated filling pressures and CI of 1.5 prompting placement of IABP. Extubated 11/3. IABP was weaned to off 11/7, however the following day developed PMVT cardiac arrest with prompt CPR and defibrillation, started on IV Lidocaine/Amio and IABP ultimately replaced on 11/9. During this admission was found to be bacteremic for which she was treated for Staph epi, Enterococcus faecalis, Cutibacterium with IV abx.  Was listed Status 2, ABO A, PRA 0% (12/12).     A suitable donor was identified and patient underwent OHT 12/25 (ischemic Time: 253min, CMV -/+, Toxo -/-), he was extubated and IABP was removed the following day. He overall is progressing well. At this time remains on  Primacor, which will be weaned as tolerated. Will undergo RHC/EMBx on Tuesday 1/2.  57 yo M with HFrEF (LVIDd 6.4 cm, LVEF 32%), CAD s/p PCI (2008), HTN, DMT2 (A1c 8.3%) and CVA s/p TPA (2018), recently treated for PNA who initially presented to [facility] via EMS after syncope reportedly requiring defibrillation. Treated for ACS but left AMA to come to [facility]. On arrival ECG with ST depression in lateral leads. Intubated 11/1 for respiratory failure and was found to have COVID. L/RHC 11/1revealing  of LAD and RCA, elevated filling pressures and CI of 1.5 prompting placement of IABP. Extubated 11/3. IABP was weaned to off 11/7, however the following day developed PMVT cardiac arrest with prompt CPR and defibrillation, started on IV Lidocaine/Amio and IABP ultimately replaced on 11/9. During this admission was found to be bacteremic for which she was treated for Staph epi, Enterococcus faecalis, Cutibacterium with IV abx.  Was listed Status 2, ABO A, PRA 0% (12/12).     A suitable donor was identified and patient underwent OHT 12/25 (ischemic Time: 253min, CMV -/+, Toxo -/-), he was extubated and IABP was removed the following day. He overall is progressing well. At this time remains on  Primacor, which will be weaned as tolerated. Will undergo RHC/EMBx on Tuesday 1/2.

## 2023-12-28 NOTE — ED PROVIDER NOTE - MEDICAL DECISION MAKING DETAILS
Dr. Cobb Note: stroke with acute visual loss, eval for ICH, possible tpa candidate, prior stroke hx. Him/He

## 2023-12-28 NOTE — PROGRESS NOTE ADULT - PROBLEM SELECTOR PLAN 1
- s/p OHT on 12/25. Ischemic Time: 253min  - IABP removed 12/26  - Sabrina and Epi weaned off 12/26  - Currently on Primacor @ 0.125 mcg/kg/min; Will be weaned off today and if repeat perfusion markers acceptable of to remove RIJ.    - Currently on Cardene gtt; Will wean as tolerated  - Continue HDZN 50mg TID; Can escalate as tolerated for BP control   - Continue Bumex 1 mg IVP BID, continue to target Goal net negative ~1-1.5L  - will undergo RHC/EMBx on Tuesday 1/2  - Please keep primary Dr. Banuelos [phone] in the loop per family request. - s/p OHT on 12/25. Ischemic Time: 253min  - IABP removed 12/26  - Sabrina and Epi weaned off 12/26  - Currently on Primacor @ 0.125 mcg/kg/min; Will be weaned off today and if repeat perfusion markers acceptable of to remove RIJ.    - Currently on Cardene gtt; Will wean as tolerated  - Continue HDZN 50mg TID; Can escalate as tolerated for BP control   - Continue Bumex 1 mg IVP BID, continue to target Goal net negative ~1-1.5L  - will undergo RHC/EMBx on Tuesday 1/2  - Please keep primary Dr. Banuelos [phone] in the loop per family request.

## 2023-12-28 NOTE — PROGRESS NOTE ADULT - CRITICAL CARE ATTENDING COMMENT
ABO: A, listed status 2 PAR 0%  s/p OHT 12/25, DBD donor, IST: 253mins,   IABP removed 12/26, Sabrina weaned off and epi weaned off   wean milrinone to off and follow MVo2  wean cardine and increase hydral po  diuretics to keep Net neg 1lit  TTE tomorrow    IS: s/p simulect 12/25 , next dose12/29  increase tacro 4mg po BID, daily monrning level  cont cellcept and solumedrol stacie    OI: CMV -/+ and toxo -/-  will start ppx when tolerating PO  Will cont nystatin   cont umm op abx    Blood Sugar management    tolerating PO, bowel regimen (no BM since 12/23)  IS, OOB to chair and ambulate  DVT/GI PPX  advance po diet as tolerated  GI PPX  feed as tolerated

## 2023-12-28 NOTE — OCCUPATIONAL THERAPY INITIAL EVALUATION ADULT - DIAGNOSIS, OT EVAL
Patient with deficits in ADL status and functional mobility due to IADP precautions
Patient with deficits in ADL status and functional mobility due to pain, impaired balance, strength, ROM, coordination

## 2023-12-28 NOTE — OCCUPATIONAL THERAPY INITIAL EVALUATION ADULT - RANGE OF MOTION EXAMINATION, LOWER EXTREMITY
L ankle WNL; L knee/hip N/T/Right LE Active ROM was WNL(within normal limits)
Right LE Active ROM was WNL(within normal limits)/bilateral LE Active ROM was WFL  (within functional limits)

## 2023-12-28 NOTE — OCCUPATIONAL THERAPY INITIAL EVALUATION ADULT - LIVES WITH, PROFILE
Pvt home, spouse and kids. 4-5 steps to enter. 1 flight to bed and bath. (I) ADLs and transfers without AD/DME. +Stall . /children/spouse
Pvt home, spouse and kids. 4-5 steps to enter. 1 flight to bed and bath. (I) ADLs and transfers without AD/DME. +Stall . /children/spouse

## 2023-12-28 NOTE — PROGRESS NOTE ADULT - ASSESSMENT
57yo M w/ PMHx HTN, CAD s/p stent (2009), ICH (2008), ICM now s/p heart transplant on 12/25/2023. Endocrine consulted for T2DM management.     #T2DM (A1c  8.3%)  Home meds: confirmed with patient on 12/28 patient takes dapagliflozin 10mg daily, patient is NOT taking ozempic any longer (has not taken in over 4 months)    - c/w insulin drip for now as patient not eating much  yet and on high doses of steroids and gtt requirements increased  - will reassess insulin requirements daily   - please notify us if your team desires to transition patient from insulin drip to basal-bolus  - Steroids: s/p solumed 500mg on 12/25/23 now on solumed taper  - DISCHARGE RECOMMENDATIONS: depending on steroid regimen on discharge and isnulin requirements basal-bolus regimen, TBD.  - OUT-PATIENT FOLLOW UP: Patient should follow up with PCP vs Endocrinology depending on insulin requirement (pt does not have endocrinolgist)    #HLD  - patient not on statin  - goal LDL in diabetes mellitus is <70    #HTN  - patient on losartan 25mg daily at home  - goal BP in diabetes mellitus is <130/80  - defer to primary team for management      discussed with patient and primary team Enrique BOSS   Contact via Microsoft Teams during business hours  To reach covering provider access AMION via sunrise tools  For Urgent matters/after-hours/weekends/holidays please page endocrine fellow on call   For nonurgent matters please email [email or web address]    Please note that this patient may be followed by different provider tomorrow.  Notify endocrine 24 hours prior to discharge for final recommendations    57yo M w/ PMHx HTN, CAD s/p stent (2009), ICH (2008), ICM now s/p heart transplant on 12/25/2023. Endocrine consulted for T2DM management.     #T2DM (A1c  8.3%)  Home meds: confirmed with patient on 12/28 patient takes dapagliflozin 10mg daily, patient is NOT taking ozempic any longer (has not taken in over 4 months)    - c/w insulin drip for now as patient not eating much  yet and on high doses of steroids and gtt requirements increased  - will reassess insulin requirements daily   - please notify us if your team desires to transition patient from insulin drip to basal-bolus  - Steroids: s/p solumed 500mg on 12/25/23 now on solumed taper  - DISCHARGE RECOMMENDATIONS: depending on steroid regimen on discharge and isnulin requirements basal-bolus regimen, TBD.  - OUT-PATIENT FOLLOW UP: Patient should follow up with PCP vs Endocrinology depending on insulin requirement (pt does not have endocrinolgist)    #HLD  - patient not on statin  - goal LDL in diabetes mellitus is <70    #HTN  - patient on losartan 25mg daily at home  - goal BP in diabetes mellitus is <130/80  - defer to primary team for management      discussed with patient and primary team Enrique BOSS   Contact via Microsoft Teams during business hours  To reach covering provider access AMION via sunrise tools  For Urgent matters/after-hours/weekends/holidays please page endocrine fellow on call   For nonurgent matters please email [email or web address]    Please note that this patient may be followed by different provider tomorrow.  Notify endocrine 24 hours prior to discharge for final recommendations

## 2023-12-28 NOTE — PROGRESS NOTE ADULT - SUBJECTIVE AND OBJECTIVE BOX
LD VALENCIA  58y Male  MRN:[id number]    Patient is a 58y old  Male who presents with a chief complaint of NSTEMI (01 Nov 2023 20:29)    HPI:  57yo M w/ hx HTN, CAD w/ 1 stent in 2009, ICH (2008) presenting with abn ekg. Patient presented to [facility] where he was found to have STEMI, recommended to get cath however patient did not want to get it there so it left and came here.  Patient initially had cough, congestion, fever, was placed on antibiotics on Sunday.  Started feeling nauseous and had a presyncopal event after which he presented to ED last night.  Had chest pain as well.  Chest pain is midsternal.  Not currently having chest pain.  Received 4 aspirin 30 min pta. (01 Nov 2023 15:11)      Patient seen and evaluated at bedside in CTU. interval events noted    Interval HPI: sitting in chair. no acute events o/n     PAST MEDICAL & SURGICAL HISTORY:  HTN (hypertension)      CAD (coronary artery disease)  2009; stent      Intracranial hemorrhage  2008      Respiratory arrest  december 1st      Myocardial infarction, unspecified MI type, unspecified artery      History of coronary artery stent placement          REVIEW OF SYSTEMS:  as per hpi     VITALS:   ICU Vital Signs Last 24 Hrs  T(C): 36.1 (28 Dec 2023 12:00), Max: 36.6 (27 Dec 2023 16:00)  T(F): 97 (28 Dec 2023 12:00), Max: 97.9 (27 Dec 2023 16:00)  HR: 81 (28 Dec 2023 12:30) (79 - 90)  BP: --  BP(mean): --  ABP: 153/56 (28 Dec 2023 12:30) (71/55 - 159/54)  ABP(mean): 83 (28 Dec 2023 12:30) (66 - 100)  RR: 15 (28 Dec 2023 12:30) (12 - 33)  SpO2: 94% (28 Dec 2023 12:30) (91% - 96%)    O2 Parameters below as of 28 Dec 2023 08:53  Patient On (Oxygen Delivery Method): nasal cannula, high flow  O2 Flow (L/min): 40  O2 Concentration (%): 30            PHYSICAL EXAM:  GENERAL: NAD, comfortable.    HEAD:  Atraumatic, Normocephalic  EYES: EOMI, PERRLA, conjunctiva and sclera clear  NECK:  No JVD. +central line   CHEST/LUNG: Clear to auscultation bilaterally; No wheeze +chest tubes  HEART: Regular rate and rhythm; No murmurs, rubs, or gallops  ABDOMEN: Soft, Nontender, Nondistended; Bowel sounds present  EXTREMITIES:  2+ Peripheral Pulses, No clubbing, cyanosis, or edema  NEUROLOGY: a0x3          Consultant(s) Notes Reviewed:  [x ] YES  [ ] NO  Care Discussed with Consultants/Other Providers [ x] YES  [ ] NO    MEDS:   MEDICATIONS  (STANDING):  bisacodyl Suppository 10 milliGRAM(s) Rectal once  budesonide  80 MICROgram(s)/formoterol 4.5 MICROgram(s) Inhaler 1 Puff(s) Inhalation two times a day  buMETAnide Injectable 1 milliGRAM(s) IV Push every 12 hours  chlorhexidine 4% Liquid 1 Application(s) Topical <User Schedule>  dextrose 50% Injectable 50 milliLiter(s) IV Push every 15 minutes  heparin   Injectable 5000 Unit(s) SubCutaneous every 8 hours  hydrALAZINE 50 milliGRAM(s) Oral every 8 hours  insulin regular Infusion 3 Unit(s)/Hr (3 mL/Hr) IV Continuous <Continuous>  lidocaine   4% Patch 3 Patch Transdermal daily  methocarbamol 500 milliGRAM(s) Oral every 8 hours  methylPREDNISolone sodium succinate Injectable   IV Push   methylPREDNISolone sodium succinate Injectable 32 milliGRAM(s) IV Push every 12 hours  metoclopramide Injectable 10 milliGRAM(s) IV Push every 8 hours  milrinone Infusion 0.125 MICROgram(s)/kG/Min (3.71 mL/Hr) IV Continuous <Continuous>  mupirocin 2% Ointment 1 Application(s) Topical two times a day  mycophenolate mofetil IVPB 1000 milliGRAM(s) IV Intermittent <User Schedule>  naloxegol 25 milliGRAM(s) Oral daily  niCARdipine Infusion 3 mG/Hr (15 mL/Hr) IV Continuous <Continuous>  nystatin    Suspension 372435 Unit(s) Oral <User Schedule>  pantoprazole  Injectable 40 milliGRAM(s) IV Push daily  polyethylene glycol 3350 17 Gram(s) Oral daily  pregabalin 50 milliGRAM(s) Oral every 8 hours  senna 2 Tablet(s) Oral at bedtime  simethicone 160 milliGRAM(s) Chew every 8 hours  sodium chloride 0.9%. 1000 milliLiter(s) (10 mL/Hr) IV Continuous <Continuous>  tacrolimus 2 milliGRAM(s) Oral <User Schedule>  traMADol 25 milliGRAM(s) Oral every 8 hours           Allergies    penicillins (Unknown)    Intolerances        LABS:                                   8.8    10.65 )-----------( 150      ( 28 Dec 2023 00:58 )             27.2   12-28    136  |  104  |  32<H>  ----------------------------<  192<H>  3.7   |  21<L>  |  1.10    Ca    8.7      28 Dec 2023 00:57  Phos  2.3     12-28  Mg     2.0     12-28    TPro  5.8<L>  /  Alb  3.8  /  TBili  0.3  /  DBili  x   /  AST  34  /  ALT  47<H>  /  AlkPhos  45  12-28        < from: CT Abdomen and Pelvis No Cont (11.28.23 @ 03:38) >  IMPRESSION:  Mildly dilated colon and prominent but not overly dilated small bowel   with air-fluid levels. No discrete transition point. Findings are   suggestive of ileus.    Intra-aortic balloon pump with the inferior marker at the level of the   inferior mesenteric artery and the balloon overlying the origins of the   renal arteries, celiac artery, and superior mesenteric artery. Consider   repositioning. Concern for IABP positioning was discussed with LANETTE Hawthorne   on 11/28/2023 at 3:42 AM by Dr. Shepard.    < end of copied text >          < from: Colonoscopy (11.17.23 @ 10:35) >                                                                                                        Impression:          - The entire examined colon is normal on direct and retroflexion views.                       - No specimens collected.  Recommendation:      - Resume previous diet today.                       - No large polyps or masses detected, No objection from GI standpoint to                 proceed with heart transplantation/advanced heart therapies.                       - Please call back should any further questions or concerns arise.    < end of copied text >     < from: Xray Chest 1 View- PORTABLE-Urgent (11.01.23 @ 07:42) >    IMPRESSION:  Clear lungs.    ---End of Report ---        < end of copied text >  < from: TTE W or WO Ultrasound Enhancing Agent (11.01.23 @ 10:23) >  _____________________________     CONCLUSIONS:      1. Left ventricular cavity is moderately dilated. Left ventricular wall thickness is normal. Left ventricular systolic function is severely decreased with an ejection fraction of 32 % by Chinchilla's method of disks. Regional wall motion abnormalities present.   2. Multiple segmental abnormalities exist. See findings.   3. There is moderate (grade 2) left ventricular diastolic dysfunction, with indeterminant filling pressure.   4. Normal right ventricular cavity size, wall thickness, and systolic function.   5. No significant valvular disease.   6. No pericardial effusion seen.   7. Compared to the transthoracic echocardiogram performed on 1/25/2017 the areas of akinesis are unchanged but there has been a decline in LV systolic function with new areas of hypokinesis.    __________________________________________________________________    < end of copied text >  < from: TTE Limited W or WO Ultrasound Enhancing Agent (11.02.23 @ 07:41) >  __________________________     CONCLUSIONS:      1. After obtaining consent, Definity ultrasound enhancing agent was given for enhanced left ventricular opacification and improved delineation of the left ventricular endocardial borders. Left ventricular systolic function is severely decreased with a calculated ejection fraction of 22 % by the Chinchilla's biplane method of disks. There is a left ventricular thrombus.   2. Findings were discussed with Litzy BOSS on 11/2/2023 at 8.49am.   3. There is a left ventricular thrombus.    _________________________________________________________________    < end of copied text >   LD VALENCIA  58y Male  MRN:[id number]    Patient is a 58y old  Male who presents with a chief complaint of NSTEMI (01 Nov 2023 20:29)    HPI:  59yo M w/ hx HTN, CAD w/ 1 stent in 2009, ICH (2008) presenting with abn ekg. Patient presented to [facility] where he was found to have STEMI, recommended to get cath however patient did not want to get it there so it left and came here.  Patient initially had cough, congestion, fever, was placed on antibiotics on Sunday.  Started feeling nauseous and had a presyncopal event after which he presented to ED last night.  Had chest pain as well.  Chest pain is midsternal.  Not currently having chest pain.  Received 4 aspirin 30 min pta. (01 Nov 2023 15:11)      Patient seen and evaluated at bedside in CTU. interval events noted    Interval HPI: sitting in chair. no acute events o/n     PAST MEDICAL & SURGICAL HISTORY:  HTN (hypertension)      CAD (coronary artery disease)  2009; stent      Intracranial hemorrhage  2008      Respiratory arrest  december 1st      Myocardial infarction, unspecified MI type, unspecified artery      History of coronary artery stent placement          REVIEW OF SYSTEMS:  as per hpi     VITALS:   ICU Vital Signs Last 24 Hrs  T(C): 36.1 (28 Dec 2023 12:00), Max: 36.6 (27 Dec 2023 16:00)  T(F): 97 (28 Dec 2023 12:00), Max: 97.9 (27 Dec 2023 16:00)  HR: 81 (28 Dec 2023 12:30) (79 - 90)  BP: --  BP(mean): --  ABP: 153/56 (28 Dec 2023 12:30) (71/55 - 159/54)  ABP(mean): 83 (28 Dec 2023 12:30) (66 - 100)  RR: 15 (28 Dec 2023 12:30) (12 - 33)  SpO2: 94% (28 Dec 2023 12:30) (91% - 96%)    O2 Parameters below as of 28 Dec 2023 08:53  Patient On (Oxygen Delivery Method): nasal cannula, high flow  O2 Flow (L/min): 40  O2 Concentration (%): 30            PHYSICAL EXAM:  GENERAL: NAD, comfortable.    HEAD:  Atraumatic, Normocephalic  EYES: EOMI, PERRLA, conjunctiva and sclera clear  NECK:  No JVD. +central line   CHEST/LUNG: Clear to auscultation bilaterally; No wheeze +chest tubes  HEART: Regular rate and rhythm; No murmurs, rubs, or gallops  ABDOMEN: Soft, Nontender, Nondistended; Bowel sounds present  EXTREMITIES:  2+ Peripheral Pulses, No clubbing, cyanosis, or edema  NEUROLOGY: a0x3          Consultant(s) Notes Reviewed:  [x ] YES  [ ] NO  Care Discussed with Consultants/Other Providers [ x] YES  [ ] NO    MEDS:   MEDICATIONS  (STANDING):  bisacodyl Suppository 10 milliGRAM(s) Rectal once  budesonide  80 MICROgram(s)/formoterol 4.5 MICROgram(s) Inhaler 1 Puff(s) Inhalation two times a day  buMETAnide Injectable 1 milliGRAM(s) IV Push every 12 hours  chlorhexidine 4% Liquid 1 Application(s) Topical <User Schedule>  dextrose 50% Injectable 50 milliLiter(s) IV Push every 15 minutes  heparin   Injectable 5000 Unit(s) SubCutaneous every 8 hours  hydrALAZINE 50 milliGRAM(s) Oral every 8 hours  insulin regular Infusion 3 Unit(s)/Hr (3 mL/Hr) IV Continuous <Continuous>  lidocaine   4% Patch 3 Patch Transdermal daily  methocarbamol 500 milliGRAM(s) Oral every 8 hours  methylPREDNISolone sodium succinate Injectable   IV Push   methylPREDNISolone sodium succinate Injectable 32 milliGRAM(s) IV Push every 12 hours  metoclopramide Injectable 10 milliGRAM(s) IV Push every 8 hours  milrinone Infusion 0.125 MICROgram(s)/kG/Min (3.71 mL/Hr) IV Continuous <Continuous>  mupirocin 2% Ointment 1 Application(s) Topical two times a day  mycophenolate mofetil IVPB 1000 milliGRAM(s) IV Intermittent <User Schedule>  naloxegol 25 milliGRAM(s) Oral daily  niCARdipine Infusion 3 mG/Hr (15 mL/Hr) IV Continuous <Continuous>  nystatin    Suspension 088720 Unit(s) Oral <User Schedule>  pantoprazole  Injectable 40 milliGRAM(s) IV Push daily  polyethylene glycol 3350 17 Gram(s) Oral daily  pregabalin 50 milliGRAM(s) Oral every 8 hours  senna 2 Tablet(s) Oral at bedtime  simethicone 160 milliGRAM(s) Chew every 8 hours  sodium chloride 0.9%. 1000 milliLiter(s) (10 mL/Hr) IV Continuous <Continuous>  tacrolimus 2 milliGRAM(s) Oral <User Schedule>  traMADol 25 milliGRAM(s) Oral every 8 hours           Allergies    penicillins (Unknown)    Intolerances        LABS:                                   8.8    10.65 )-----------( 150      ( 28 Dec 2023 00:58 )             27.2   12-28    136  |  104  |  32<H>  ----------------------------<  192<H>  3.7   |  21<L>  |  1.10    Ca    8.7      28 Dec 2023 00:57  Phos  2.3     12-28  Mg     2.0     12-28    TPro  5.8<L>  /  Alb  3.8  /  TBili  0.3  /  DBili  x   /  AST  34  /  ALT  47<H>  /  AlkPhos  45  12-28        < from: CT Abdomen and Pelvis No Cont (11.28.23 @ 03:38) >  IMPRESSION:  Mildly dilated colon and prominent but not overly dilated small bowel   with air-fluid levels. No discrete transition point. Findings are   suggestive of ileus.    Intra-aortic balloon pump with the inferior marker at the level of the   inferior mesenteric artery and the balloon overlying the origins of the   renal arteries, celiac artery, and superior mesenteric artery. Consider   repositioning. Concern for IABP positioning was discussed with LANETTE Hawthorne   on 11/28/2023 at 3:42 AM by Dr. Shepard.    < end of copied text >          < from: Colonoscopy (11.17.23 @ 10:35) >                                                                                                        Impression:          - The entire examined colon is normal on direct and retroflexion views.                       - No specimens collected.  Recommendation:      - Resume previous diet today.                       - No large polyps or masses detected, No objection from GI standpoint to                 proceed with heart transplantation/advanced heart therapies.                       - Please call back should any further questions or concerns arise.    < end of copied text >     < from: Xray Chest 1 View- PORTABLE-Urgent (11.01.23 @ 07:42) >    IMPRESSION:  Clear lungs.    ---End of Report ---        < end of copied text >  < from: TTE W or WO Ultrasound Enhancing Agent (11.01.23 @ 10:23) >  _____________________________     CONCLUSIONS:      1. Left ventricular cavity is moderately dilated. Left ventricular wall thickness is normal. Left ventricular systolic function is severely decreased with an ejection fraction of 32 % by Chinchilla's method of disks. Regional wall motion abnormalities present.   2. Multiple segmental abnormalities exist. See findings.   3. There is moderate (grade 2) left ventricular diastolic dysfunction, with indeterminant filling pressure.   4. Normal right ventricular cavity size, wall thickness, and systolic function.   5. No significant valvular disease.   6. No pericardial effusion seen.   7. Compared to the transthoracic echocardiogram performed on 1/25/2017 the areas of akinesis are unchanged but there has been a decline in LV systolic function with new areas of hypokinesis.    __________________________________________________________________    < end of copied text >  < from: TTE Limited W or WO Ultrasound Enhancing Agent (11.02.23 @ 07:41) >  __________________________     CONCLUSIONS:      1. After obtaining consent, Definity ultrasound enhancing agent was given for enhanced left ventricular opacification and improved delineation of the left ventricular endocardial borders. Left ventricular systolic function is severely decreased with a calculated ejection fraction of 22 % by the Chinchilla's biplane method of disks. There is a left ventricular thrombus.   2. Findings were discussed with Litzy BOSS on 11/2/2023 at 8.49am.   3. There is a left ventricular thrombus.    _________________________________________________________________    < end of copied text >

## 2023-12-28 NOTE — PROGRESS NOTE ADULT - SUBJECTIVE AND OBJECTIVE BOX
Patient seen and examined at the bedside.    Remained critically ill on continuous ICU monitoring.      Brief Summary:  59yo M w/ PMHx HTN, CAD s/p stent (2009), ICH (2008), ICM now s/p OHT with IABP on 12/25/2023.      24 Hour events:  - Taken to the OR for *Surgery*  - Drips:  - Echo:   - Blood Products:       OBJECTIVE:  Vital Signs Last 24 Hrs  T(C): 36 (28 Dec 2023 00:00), Max: 36.7 (27 Dec 2023 12:00)  T(F): 96.8 (28 Dec 2023 00:00), Max: 98 (27 Dec 2023 12:00)  HR: 83 (28 Dec 2023 07:00) (82 - 92)  BP: --  BP(mean): --  RR: 16 (28 Dec 2023 04:15) (12 - 33)  SpO2: 95% (28 Dec 2023 07:00) (91% - 97%)    Parameters below as of 28 Dec 2023 04:00  Patient On (Oxygen Delivery Method): nasal cannula, high flow    O2 Concentration (%): 30                Physical Exam:   General: awake, interactive  Neurology: intact, AAOx4  Respiratory: on HFNC, non-labored respirations  CV: NSR - TPM DDD 60  Abdominal: Soft, NT  : +Llamas  Extremities: warm to palpation  Skin: No Rashes, Hematoma, Ecchymosis    -------------------------------------------------------------------------------------------------------------------------------    Labs:                        8.8    10.65 )-----------( 150      ( 28 Dec 2023 00:58 )             27.2     12-28    136  |  104  |  32<H>  ----------------------------<  192<H>  3.7   |  21<L>  |  1.10    Ca    8.7      28 Dec 2023 00:57  Phos  2.3     12-28  Mg     2.0     12-28    TPro  5.8<L>  /  Alb  3.8  /  TBili  0.3  /  DBili  x   /  AST  34  /  ALT  47<H>  /  AlkPhos  45  12-28    LIVER FUNCTIONS - ( 28 Dec 2023 00:57 )  Alb: 3.8 g/dL / Pro: 5.8 g/dL / ALK PHOS: 45 U/L / ALT: 47 U/L / AST: 34 U/L / GGT: x           PT/INR - ( 28 Dec 2023 00:58 )   PT: 12.8 sec;   INR: 1.17 ratio         PTT - ( 28 Dec 2023 00:58 )  PTT:26.6 sec  ABG - ( 28 Dec 2023 00:17 )  pH, Arterial: 7.43  pH, Blood: x     /  pCO2: 34    /  pO2: 78    / HCO3: 23    / Base Excess: -1.4  /  SaO2: 97.5              ------------------------------------------------------------------------------------------------------------------------------  Assessment:  59yo M w/ PMHx HTN, CAD s/p stent (2009), ICH (2008), ICM now s/p OHT with IABP on 12/25/2023.    S/p OHT on 12/25/2023  At risk for hemodynamic instability and cardiac arrhythmias  Post op respiratory insufficiency  Receiving inotropic medication  Acute blood loss anemia  Thrombocytopenia    Plan:   ***Neuro***  - Post-operative pain control with Gabapentin, tramadol, and Robaxin for pain  - Optimize day/night cycles.  - OOBTC / PT as tolerated    ***Cardiovascular***  - At risk for hemodynamic instability and cardiac arrhythmias.  - Wean milrinone as tolerated.  - Cardene for afterload reduction.  - Continue Hydralazine  - Monitor chest tube output.   - D/daniela PA catheter.  - D/daniela femoral a-line.    ***Pulmonary***.  - Acute respiratory insufficiency  - on High Flow Nasal Cannula. Wean oxygen as able.  - Deep breathing and coughing exercises.  - Continue bronchodilator as needed.     ***GI***  - Continue diet.  - Protonix for stress ulcer prophylaxis   - Bowel regimen.  - Reglan for gut motility  - Simethicone for gas    ***Renal***  - Llamas catheter for strict I/O measurements.    - Replete electrolytes as indicated.   - Diuresis with Bumex    ***ID***  - Cefepime for perioperative transplant coverage  - PO Vanco for c.diff prophylaxis  - Immunosuppression - tacro, cellcept, steroids.  - Nystatin for prophylaxis    ***Endocrine***  - Diabetes Mellitus 2  - Insulin gtt as needed to maintain euglycemia.      ***Hematology***  - Acute blood loss anemia  - SQ Heparin for DVT prophylaxis  - No current transfusion indication        Patient requires continuous monitoring with bedside rhythm monitoring, pulse oximetry monitoring, and continuous central venous and arterial pressure monitoring; and intermittent blood gas analysis. Care plan discussed with the ICU care team.   Patient remained critical, at risk for life threatening decompensation.    I have spent 30 minutes providing critical care management to this patient.    By signing my name below, I, Judith Pilloginny, attest that this documentation has been prepared under the direction and in the presence of Tasha Shoemaker DO  Electronically signed: Judith Garber, 12-28-23 @ 07:15    I, Tasha Shoemaker DO, personally performed the services described in this documentation. all medical record entries made by the scribe were at my direction and in my presence. I have reviewed the chart and agree that the record reflects my personal performance and is accurate and complete  Electronically signed: Tasha Shoemaker DO, Patient seen and examined at the bedside.    Remained critically ill on continuous ICU monitoring.      Brief Summary:  57yo M w/ PMHx HTN, CAD s/p stent (2009), ICH (2008), ICM now s/p OHT with IABP on 12/25/2023.      24 Hour events:  - Taken to the OR for *Surgery*  - Drips:  - Echo:   - Blood Products:       OBJECTIVE:  Vital Signs Last 24 Hrs  T(C): 36 (28 Dec 2023 00:00), Max: 36.7 (27 Dec 2023 12:00)  T(F): 96.8 (28 Dec 2023 00:00), Max: 98 (27 Dec 2023 12:00)  HR: 83 (28 Dec 2023 07:00) (82 - 92)  BP: --  BP(mean): --  RR: 16 (28 Dec 2023 04:15) (12 - 33)  SpO2: 95% (28 Dec 2023 07:00) (91% - 97%)    Parameters below as of 28 Dec 2023 04:00  Patient On (Oxygen Delivery Method): nasal cannula, high flow    O2 Concentration (%): 30                Physical Exam:   General: awake, interactive  Neurology: intact, AAOx4  Respiratory: on HFNC, non-labored respirations  CV: NSR - TPM DDD 60  Abdominal: Soft, NT  : +Llamas  Extremities: warm to palpation  Skin: No Rashes, Hematoma, Ecchymosis    -------------------------------------------------------------------------------------------------------------------------------    Labs:                        8.8    10.65 )-----------( 150      ( 28 Dec 2023 00:58 )             27.2     12-28    136  |  104  |  32<H>  ----------------------------<  192<H>  3.7   |  21<L>  |  1.10    Ca    8.7      28 Dec 2023 00:57  Phos  2.3     12-28  Mg     2.0     12-28    TPro  5.8<L>  /  Alb  3.8  /  TBili  0.3  /  DBili  x   /  AST  34  /  ALT  47<H>  /  AlkPhos  45  12-28    LIVER FUNCTIONS - ( 28 Dec 2023 00:57 )  Alb: 3.8 g/dL / Pro: 5.8 g/dL / ALK PHOS: 45 U/L / ALT: 47 U/L / AST: 34 U/L / GGT: x           PT/INR - ( 28 Dec 2023 00:58 )   PT: 12.8 sec;   INR: 1.17 ratio         PTT - ( 28 Dec 2023 00:58 )  PTT:26.6 sec  ABG - ( 28 Dec 2023 00:17 )  pH, Arterial: 7.43  pH, Blood: x     /  pCO2: 34    /  pO2: 78    / HCO3: 23    / Base Excess: -1.4  /  SaO2: 97.5              ------------------------------------------------------------------------------------------------------------------------------  Assessment:  57yo M w/ PMHx HTN, CAD s/p stent (2009), ICH (2008), ICM now s/p OHT with IABP on 12/25/2023.    S/p OHT on 12/25/2023  At risk for hemodynamic instability and cardiac arrhythmias  Post op respiratory insufficiency  Receiving inotropic medication  Acute blood loss anemia  Thrombocytopenia    Plan:   ***Neuro***  - Post-operative pain control with Gabapentin, tramadol, and Robaxin for pain  - Optimize day/night cycles.  - OOBTC / PT as tolerated    ***Cardiovascular***  - At risk for hemodynamic instability and cardiac arrhythmias.  - Wean milrinone as tolerated.  - Cardene for afterload reduction.  - Continue Hydralazine  - Monitor chest tube output.   - D/daniela PA catheter.  - D/daniela femoral a-line.    ***Pulmonary***.  - Acute respiratory insufficiency  - on High Flow Nasal Cannula. Wean oxygen as able.  - Deep breathing and coughing exercises.  - Continue bronchodilator as needed.     ***GI***  - Continue diet.  - Protonix for stress ulcer prophylaxis   - Bowel regimen.  - Reglan for gut motility  - Simethicone for gas    ***Renal***  - Llamas catheter for strict I/O measurements.    - Replete electrolytes as indicated.   - Diuresis with Bumex    ***ID***  - Cefepime for perioperative transplant coverage  - PO Vanco for c.diff prophylaxis  - Immunosuppression - tacro, cellcept, steroids.  - Nystatin for prophylaxis    ***Endocrine***  - Diabetes Mellitus 2  - Insulin gtt as needed to maintain euglycemia.      ***Hematology***  - Acute blood loss anemia  - SQ Heparin for DVT prophylaxis  - No current transfusion indication        Patient requires continuous monitoring with bedside rhythm monitoring, pulse oximetry monitoring, and continuous central venous and arterial pressure monitoring; and intermittent blood gas analysis. Care plan discussed with the ICU care team.   Patient remained critical, at risk for life threatening decompensation.    I have spent 30 minutes providing critical care management to this patient.    By signing my name below, I, Judith Pilloginny, attest that this documentation has been prepared under the direction and in the presence of Tasha Shoemaker DO  Electronically signed: Judith Garber, 12-28-23 @ 07:15    I, Tasha Shoemaker DO, personally performed the services described in this documentation. all medical record entries made by the scribe were at my direction and in my presence. I have reviewed the chart and agree that the record reflects my personal performance and is accurate and complete  Electronically signed: Tasha Shoemaker DO, Patient seen and examined at the bedside.    Remained critically ill on continuous ICU monitoring.      Brief Summary:  57yo M w/ PMHx HTN, CAD s/p stent (2009), ICH (2008), ICM now s/p OHT with IABP on 12/25/2023.      24 Hour events:  - OOBTC  - Transition to Lyrica for pain management  - Wean primacor  - Keep Meds CT, Remove L. Pleural CT  - No BM since yesterday  - Maintain -1L, currently -1.6L  - Remove Llamas    OBJECTIVE:  Vital Signs Last 24 Hrs  T(C): 36 (28 Dec 2023 00:00), Max: 36.7 (27 Dec 2023 12:00)  T(F): 96.8 (28 Dec 2023 00:00), Max: 98 (27 Dec 2023 12:00)  HR: 83 (28 Dec 2023 07:00) (82 - 92)  BP: --  BP(mean): --  RR: 16 (28 Dec 2023 04:15) (12 - 33)  SpO2: 95% (28 Dec 2023 07:00) (91% - 97%)    Parameters below as of 28 Dec 2023 04:00  Patient On (Oxygen Delivery Method): nasal cannula, high flow    O2 Concentration (%): 30                Physical Exam:   General: awake, interactive  Neurology: intact, AAOx4  Respiratory: on HFNC, non-labored respirations  CV: NSR - TPM DDD 60  Abdominal: Soft, NT  : +Llamas  Extremities: warm to palpation  Skin: No Rashes, Hematoma, Ecchymosis    -------------------------------------------------------------------------------------------------------------------------------    Labs:                        8.8    10.65 )-----------( 150      ( 28 Dec 2023 00:58 )             27.2     12-28    136  |  104  |  32<H>  ----------------------------<  192<H>  3.7   |  21<L>  |  1.10    Ca    8.7      28 Dec 2023 00:57  Phos  2.3     12-28  Mg     2.0     12-28    TPro  5.8<L>  /  Alb  3.8  /  TBili  0.3  /  DBili  x   /  AST  34  /  ALT  47<H>  /  AlkPhos  45  12-28    LIVER FUNCTIONS - ( 28 Dec 2023 00:57 )  Alb: 3.8 g/dL / Pro: 5.8 g/dL / ALK PHOS: 45 U/L / ALT: 47 U/L / AST: 34 U/L / GGT: x           PT/INR - ( 28 Dec 2023 00:58 )   PT: 12.8 sec;   INR: 1.17 ratio         PTT - ( 28 Dec 2023 00:58 )  PTT:26.6 sec  ABG - ( 28 Dec 2023 00:17 )  pH, Arterial: 7.43  pH, Blood: x     /  pCO2: 34    /  pO2: 78    / HCO3: 23    / Base Excess: -1.4  /  SaO2: 97.5              ------------------------------------------------------------------------------------------------------------------------------  Assessment:  57yo M w/ PMHx HTN, CAD s/p stent (2009), ICH (2008), ICM now s/p OHT with IABP on 12/25/2023.    S/p OHT on 12/25/2023  At risk for hemodynamic instability and cardiac arrhythmias  Post op respiratory insufficiency  Receiving inotropic medication  Acute blood loss anemia  Thrombocytopenia    Plan:   ***Neuro***  - Post-operative pain control with Gabapentin, tramadol, and Robaxin for pain --> Add Lyrica  - Optimize day/night cycles.  - OOBTC / PT as tolerated    ***Cardiovascular***  - At risk for hemodynamic instability and cardiac arrhythmias.  - Wean milrinone as tolerated.--> continue to wean  - Cardene for afterload reduction.  - Continue Hydralazine  - Monitor chest tube output.   - Keep Meds CT, Remove L. Pleural CT  - D/daniela PA catheter.  - D/daniela femoral a-line.    ***Pulmonary***.  - Acute respiratory insufficiency  - on High Flow Nasal Cannula. Wean oxygen as able.  - Deep breathing and coughing exercises.  - Continue bronchodilator as needed.     ***GI***  - Continue diet.  - Protonix for stress ulcer prophylaxis   - Bowel regimen.  - Reglan for gut motility  - Simethicone for gas  - No BM since yesterday    ***Renal***  - Llamas catheter for strict I/O measurements.    - Replete electrolytes as indicated.  - Diuresis with Bumex  - Maintain -1L, currently -1.6L  - Remove Llamas    ***ID***  - Cefepime for perioperative transplant coverage  - PO Vanco for c.diff prophylaxis  - Immunosuppression - tacro, cellcept, steroids.  - Nystatin for prophylaxis    ***Endocrine***  - Diabetes Mellitus 2  - Insulin gtt as needed to maintain euglycemia.      ***Hematology***  - Acute blood loss anemia  - SQ Heparin for DVT prophylaxis  - No current transfusion indication        Patient requires continuous monitoring with bedside rhythm monitoring, pulse oximetry monitoring, and continuous central venous and arterial pressure monitoring; and intermittent blood gas analysis. Care plan discussed with the ICU care team.   Patient remained critical, at risk for life threatening decompensation.    I have spent 30 minutes providing critical care management to this patient.    By signing my name below, I, Judith Garber, attest that this documentation has been prepared under the direction and in the presence of Tasha Shoemaker DO  Electronically signed: Judith Garber, 12-28-23 @ 07:15    I, Tasha Shoemaker DO, personally performed the services described in this documentation. all medical record entries made by the scribe were at my direction and in my presence. I have reviewed the chart and agree that the record reflects my personal performance and is accurate and complete  Electronically signed: Tasha Shoemaker DO, Patient seen and examined at the bedside.    Remained critically ill on continuous ICU monitoring.      Brief Summary:  57yo M w/ PMHx HTN, CAD s/p stent (2009), ICH (2008), ICM now s/p OHT with IABP on 12/25/2023.      24 Hour events:  - Milrinone weaned to 0.125.  - No acute events overnight.    OBJECTIVE:  Vital Signs Last 24 Hrs  T(C): 36 (28 Dec 2023 00:00), Max: 36.7 (27 Dec 2023 12:00)  T(F): 96.8 (28 Dec 2023 00:00), Max: 98 (27 Dec 2023 12:00)  HR: 83 (28 Dec 2023 07:00) (82 - 92)  BP: --  BP(mean): --  RR: 16 (28 Dec 2023 04:15) (12 - 33)  SpO2: 95% (28 Dec 2023 07:00) (91% - 97%)    Parameters below as of 28 Dec 2023 04:00  Patient On (Oxygen Delivery Method): nasal cannula, high flow    O2 Concentration (%): 30                Physical Exam:   General: awake, out of bed to chair  Neurology: intact, AAOx4  Respiratory: on HFNC, non-labored respirations  CV: sinus rhythm  Abdominal: Soft, NT, distended  : +Llamas  Extremities: warm, moving all extremities  Skin: No Rashes, Hematoma, Ecchymosis    -------------------------------------------------------------------------------------------------------------------------------    Labs:                        8.8    10.65 )-----------( 150      ( 28 Dec 2023 00:58 )             27.2     12-28    136  |  104  |  32<H>  ----------------------------<  192<H>  3.7   |  21<L>  |  1.10    Ca    8.7      28 Dec 2023 00:57  Phos  2.3     12-28  Mg     2.0     12-28    TPro  5.8<L>  /  Alb  3.8  /  TBili  0.3  /  DBili  x   /  AST  34  /  ALT  47<H>  /  AlkPhos  45  12-28    LIVER FUNCTIONS - ( 28 Dec 2023 00:57 )  Alb: 3.8 g/dL / Pro: 5.8 g/dL / ALK PHOS: 45 U/L / ALT: 47 U/L / AST: 34 U/L / GGT: x           PT/INR - ( 28 Dec 2023 00:58 )   PT: 12.8 sec;   INR: 1.17 ratio         PTT - ( 28 Dec 2023 00:58 )  PTT:26.6 sec  ABG - ( 28 Dec 2023 00:17 )  pH, Arterial: 7.43  pH, Blood: x     /  pCO2: 34    /  pO2: 78    / HCO3: 23    / Base Excess: -1.4  /  SaO2: 97.5              ------------------------------------------------------------------------------------------------------------------------------  Assessment:  57yo M w/ PMHx HTN, CAD s/p stent (2009), ICH (2008), ICM now s/p OHT with IABP on 12/25/2023.    S/p OHT on 12/25/2023  At risk for hemodynamic instability and cardiac arrhythmias  Post op respiratory insufficiency  Acute blood loss anemia  Thrombocytopenia    Plan:   ***Neuro***  - Post-operative pain control with Gabapentin, tramadol, and Robaxin for pain.  - Optimize day/night cycles.  - OOBTC / PT as tolerated    ***Cardiovascular***  - At risk for hemodynamic instability and cardiac arrhythmias.  - Wean milrinone to off.  - Hydralazine, Cardene for afterload reduction.  Wean cardene as tolerated.  - Monitor chest tube output.   - D/c L pleural chest tube.  - D/c central line.    ***Pulmonary***.  - Acute post-operative respiratory insufficiency  - on High Flow Nasal Cannula. Wean oxygen as able.  - Deep breathing and coughing exercises.  - Continue bronchodilator as needed.     ***GI***  - Continue diet.  - Protonix for stress ulcer prophylaxis   - Bowel regimen.    ***Renal***  - Llamas catheter for strict I/O measurements.    - Replete electrolytes as indicated.  - Diuresis with Bumex  - Goal net negative 1L.    ***ID***  - PO Vanco for c.diff prophylaxis  - Immunosuppression - tacro, cellcept, steroids.  - Nystatin for prophylaxis    ***Endocrine***  - Diabetes Mellitus 2  - Insulin gtt as needed to maintain euglycemia.      ***Hematology***  - Acute blood loss anemia  - SQ Heparin for DVT prophylaxis  - No current transfusion indication        Patient requires continuous monitoring with bedside rhythm monitoring, pulse oximetry monitoring, and continuous central venous and arterial pressure monitoring; and intermittent blood gas analysis. Care plan discussed with the ICU care team.   Patient remained critical, at risk for life threatening decompensation.    I have spent 30 minutes providing critical care management to this patient.    By signing my name below, I, Judith Garber, attest that this documentation has been prepared under the direction and in the presence of Tasha Shoemaker DO  Electronically signed: Judith Garber, 12-28-23 @ 07:15    I, Tasha Shoemaker DO, personally performed the services described in this documentation. all medical record entries made by the scribe were at my direction and in my presence. I have reviewed the chart and agree that the record reflects my personal performance and is accurate and complete  Electronically signed: Tasha Shoemaker DO,

## 2023-12-28 NOTE — PROGRESS NOTE ADULT - PROBLEM SELECTOR PLAN 3
- CMV -/+: intermediate risk.  Plan to start Valcyte within the upcoming days  - Toxo -/-: none required  - PJP ppx: plan to start bactrim or mepron within the upcoming days, will be based off renal function   - Trush ppx: continue Nystatin S/S q6 hours   - donor HCV TIM - but HCV antibody +.  - on daptomycin ppx for hx of rash to vancomycin and donor sputum + for staph.

## 2023-12-29 LAB
ALBUMIN SERPL ELPH-MCNC: 3.8 G/DL — SIGNIFICANT CHANGE UP (ref 3.3–5)
ALBUMIN SERPL ELPH-MCNC: 3.8 G/DL — SIGNIFICANT CHANGE UP (ref 3.3–5)
ALP SERPL-CCNC: 45 U/L — SIGNIFICANT CHANGE UP (ref 40–120)
ALP SERPL-CCNC: 45 U/L — SIGNIFICANT CHANGE UP (ref 40–120)
ALT FLD-CCNC: 40 U/L — SIGNIFICANT CHANGE UP (ref 10–45)
ALT FLD-CCNC: 40 U/L — SIGNIFICANT CHANGE UP (ref 10–45)
ANION GAP SERPL CALC-SCNC: 10 MMOL/L — SIGNIFICANT CHANGE UP (ref 5–17)
ANION GAP SERPL CALC-SCNC: 10 MMOL/L — SIGNIFICANT CHANGE UP (ref 5–17)
AST SERPL-CCNC: 20 U/L — SIGNIFICANT CHANGE UP (ref 10–40)
AST SERPL-CCNC: 20 U/L — SIGNIFICANT CHANGE UP (ref 10–40)
BASE EXCESS BLDV CALC-SCNC: -4.4 MMOL/L — LOW (ref -2–3)
BASE EXCESS BLDV CALC-SCNC: -4.4 MMOL/L — LOW (ref -2–3)
BASE EXCESS BLDV CALC-SCNC: 2.4 MMOL/L — SIGNIFICANT CHANGE UP (ref -2–3)
BASE EXCESS BLDV CALC-SCNC: 2.4 MMOL/L — SIGNIFICANT CHANGE UP (ref -2–3)
BASE EXCESS BLDV CALC-SCNC: 7 MMOL/L — HIGH (ref -2–3)
BASE EXCESS BLDV CALC-SCNC: 7 MMOL/L — HIGH (ref -2–3)
BASOPHILS # BLD AUTO: 0.01 K/UL — SIGNIFICANT CHANGE UP (ref 0–0.2)
BASOPHILS # BLD AUTO: 0.01 K/UL — SIGNIFICANT CHANGE UP (ref 0–0.2)
BASOPHILS NFR BLD AUTO: 0.1 % — SIGNIFICANT CHANGE UP (ref 0–2)
BASOPHILS NFR BLD AUTO: 0.1 % — SIGNIFICANT CHANGE UP (ref 0–2)
BILIRUB SERPL-MCNC: 0.4 MG/DL — SIGNIFICANT CHANGE UP (ref 0.2–1.2)
BILIRUB SERPL-MCNC: 0.4 MG/DL — SIGNIFICANT CHANGE UP (ref 0.2–1.2)
BUN SERPL-MCNC: 31 MG/DL — HIGH (ref 7–23)
BUN SERPL-MCNC: 31 MG/DL — HIGH (ref 7–23)
CA-I SERPL-SCNC: 1.09 MMOL/L — LOW (ref 1.15–1.33)
CA-I SERPL-SCNC: 1.09 MMOL/L — LOW (ref 1.15–1.33)
CALCIUM SERPL-MCNC: 8.9 MG/DL — SIGNIFICANT CHANGE UP (ref 8.4–10.5)
CALCIUM SERPL-MCNC: 8.9 MG/DL — SIGNIFICANT CHANGE UP (ref 8.4–10.5)
CHLORIDE BLDV-SCNC: 100 MMOL/L — SIGNIFICANT CHANGE UP (ref 96–108)
CHLORIDE BLDV-SCNC: 100 MMOL/L — SIGNIFICANT CHANGE UP (ref 96–108)
CHLORIDE SERPL-SCNC: 102 MMOL/L — SIGNIFICANT CHANGE UP (ref 96–108)
CHLORIDE SERPL-SCNC: 102 MMOL/L — SIGNIFICANT CHANGE UP (ref 96–108)
CO2 BLDV-SCNC: 22 MMOL/L — SIGNIFICANT CHANGE UP (ref 22–26)
CO2 BLDV-SCNC: 22 MMOL/L — SIGNIFICANT CHANGE UP (ref 22–26)
CO2 BLDV-SCNC: 28 MMOL/L — HIGH (ref 22–26)
CO2 BLDV-SCNC: 28 MMOL/L — HIGH (ref 22–26)
CO2 BLDV-SCNC: 33 MMOL/L — HIGH (ref 22–26)
CO2 BLDV-SCNC: 33 MMOL/L — HIGH (ref 22–26)
CO2 SERPL-SCNC: 24 MMOL/L — SIGNIFICANT CHANGE UP (ref 22–31)
CO2 SERPL-SCNC: 24 MMOL/L — SIGNIFICANT CHANGE UP (ref 22–31)
CREAT SERPL-MCNC: 0.87 MG/DL — SIGNIFICANT CHANGE UP (ref 0.5–1.3)
CREAT SERPL-MCNC: 0.87 MG/DL — SIGNIFICANT CHANGE UP (ref 0.5–1.3)
EGFR: 99 ML/MIN/1.73M2 — SIGNIFICANT CHANGE UP
EGFR: 99 ML/MIN/1.73M2 — SIGNIFICANT CHANGE UP
EOSINOPHIL # BLD AUTO: 0 K/UL — SIGNIFICANT CHANGE UP (ref 0–0.5)
EOSINOPHIL # BLD AUTO: 0 K/UL — SIGNIFICANT CHANGE UP (ref 0–0.5)
EOSINOPHIL NFR BLD AUTO: 0 % — SIGNIFICANT CHANGE UP (ref 0–6)
EOSINOPHIL NFR BLD AUTO: 0 % — SIGNIFICANT CHANGE UP (ref 0–6)
GAS PNL BLDA: SIGNIFICANT CHANGE UP
GAS PNL BLDV: 133 MMOL/L — LOW (ref 136–145)
GAS PNL BLDV: 133 MMOL/L — LOW (ref 136–145)
GAS PNL BLDV: SIGNIFICANT CHANGE UP
GLUCOSE BLDC GLUCOMTR-MCNC: 120 MG/DL — HIGH (ref 70–99)
GLUCOSE BLDC GLUCOMTR-MCNC: 120 MG/DL — HIGH (ref 70–99)
GLUCOSE BLDC GLUCOMTR-MCNC: 122 MG/DL — HIGH (ref 70–99)
GLUCOSE BLDC GLUCOMTR-MCNC: 122 MG/DL — HIGH (ref 70–99)
GLUCOSE BLDC GLUCOMTR-MCNC: 141 MG/DL — HIGH (ref 70–99)
GLUCOSE BLDC GLUCOMTR-MCNC: 141 MG/DL — HIGH (ref 70–99)
GLUCOSE BLDC GLUCOMTR-MCNC: 149 MG/DL — HIGH (ref 70–99)
GLUCOSE BLDC GLUCOMTR-MCNC: 149 MG/DL — HIGH (ref 70–99)
GLUCOSE BLDC GLUCOMTR-MCNC: 150 MG/DL — HIGH (ref 70–99)
GLUCOSE BLDC GLUCOMTR-MCNC: 150 MG/DL — HIGH (ref 70–99)
GLUCOSE BLDC GLUCOMTR-MCNC: 151 MG/DL — HIGH (ref 70–99)
GLUCOSE BLDC GLUCOMTR-MCNC: 151 MG/DL — HIGH (ref 70–99)
GLUCOSE BLDC GLUCOMTR-MCNC: 169 MG/DL — HIGH (ref 70–99)
GLUCOSE BLDC GLUCOMTR-MCNC: 169 MG/DL — HIGH (ref 70–99)
GLUCOSE BLDC GLUCOMTR-MCNC: 172 MG/DL — HIGH (ref 70–99)
GLUCOSE BLDC GLUCOMTR-MCNC: 172 MG/DL — HIGH (ref 70–99)
GLUCOSE BLDC GLUCOMTR-MCNC: 184 MG/DL — HIGH (ref 70–99)
GLUCOSE BLDC GLUCOMTR-MCNC: 184 MG/DL — HIGH (ref 70–99)
GLUCOSE BLDC GLUCOMTR-MCNC: 188 MG/DL — HIGH (ref 70–99)
GLUCOSE BLDC GLUCOMTR-MCNC: 188 MG/DL — HIGH (ref 70–99)
GLUCOSE BLDC GLUCOMTR-MCNC: 198 MG/DL — HIGH (ref 70–99)
GLUCOSE BLDC GLUCOMTR-MCNC: 198 MG/DL — HIGH (ref 70–99)
GLUCOSE BLDC GLUCOMTR-MCNC: 199 MG/DL — HIGH (ref 70–99)
GLUCOSE BLDC GLUCOMTR-MCNC: 199 MG/DL — HIGH (ref 70–99)
GLUCOSE BLDC GLUCOMTR-MCNC: 202 MG/DL — HIGH (ref 70–99)
GLUCOSE BLDC GLUCOMTR-MCNC: 202 MG/DL — HIGH (ref 70–99)
GLUCOSE BLDC GLUCOMTR-MCNC: 211 MG/DL — HIGH (ref 70–99)
GLUCOSE BLDC GLUCOMTR-MCNC: 211 MG/DL — HIGH (ref 70–99)
GLUCOSE BLDC GLUCOMTR-MCNC: 214 MG/DL — HIGH (ref 70–99)
GLUCOSE BLDC GLUCOMTR-MCNC: 214 MG/DL — HIGH (ref 70–99)
GLUCOSE BLDC GLUCOMTR-MCNC: 221 MG/DL — HIGH (ref 70–99)
GLUCOSE BLDC GLUCOMTR-MCNC: 221 MG/DL — HIGH (ref 70–99)
GLUCOSE BLDC GLUCOMTR-MCNC: 230 MG/DL — HIGH (ref 70–99)
GLUCOSE BLDC GLUCOMTR-MCNC: 230 MG/DL — HIGH (ref 70–99)
GLUCOSE BLDC GLUCOMTR-MCNC: 241 MG/DL — HIGH (ref 70–99)
GLUCOSE BLDC GLUCOMTR-MCNC: 241 MG/DL — HIGH (ref 70–99)
GLUCOSE BLDC GLUCOMTR-MCNC: 244 MG/DL — HIGH (ref 70–99)
GLUCOSE BLDC GLUCOMTR-MCNC: 244 MG/DL — HIGH (ref 70–99)
GLUCOSE BLDC GLUCOMTR-MCNC: 256 MG/DL — HIGH (ref 70–99)
GLUCOSE BLDC GLUCOMTR-MCNC: 256 MG/DL — HIGH (ref 70–99)
GLUCOSE BLDC GLUCOMTR-MCNC: 278 MG/DL — HIGH (ref 70–99)
GLUCOSE BLDC GLUCOMTR-MCNC: 278 MG/DL — HIGH (ref 70–99)
GLUCOSE BLDC GLUCOMTR-MCNC: 329 MG/DL — HIGH (ref 70–99)
GLUCOSE BLDC GLUCOMTR-MCNC: 329 MG/DL — HIGH (ref 70–99)
GLUCOSE BLDV-MCNC: 264 MG/DL — HIGH (ref 70–99)
GLUCOSE BLDV-MCNC: 264 MG/DL — HIGH (ref 70–99)
GLUCOSE SERPL-MCNC: 155 MG/DL — HIGH (ref 70–99)
GLUCOSE SERPL-MCNC: 155 MG/DL — HIGH (ref 70–99)
HCO3 BLDV-SCNC: 21 MMOL/L — LOW (ref 22–29)
HCO3 BLDV-SCNC: 21 MMOL/L — LOW (ref 22–29)
HCO3 BLDV-SCNC: 27 MMOL/L — SIGNIFICANT CHANGE UP (ref 22–29)
HCO3 BLDV-SCNC: 27 MMOL/L — SIGNIFICANT CHANGE UP (ref 22–29)
HCO3 BLDV-SCNC: 31 MMOL/L — HIGH (ref 22–29)
HCO3 BLDV-SCNC: 31 MMOL/L — HIGH (ref 22–29)
HCT VFR BLD CALC: 26.7 % — LOW (ref 39–50)
HCT VFR BLD CALC: 26.7 % — LOW (ref 39–50)
HCT VFR BLDA CALC: 27 % — LOW (ref 39–51)
HCT VFR BLDA CALC: 27 % — LOW (ref 39–51)
HGB BLD CALC-MCNC: 8.9 G/DL — LOW (ref 12.6–17.4)
HGB BLD CALC-MCNC: 8.9 G/DL — LOW (ref 12.6–17.4)
HGB BLD-MCNC: 8.8 G/DL — LOW (ref 13–17)
HGB BLD-MCNC: 8.8 G/DL — LOW (ref 13–17)
HOROWITZ INDEX BLDV+IHG-RTO: 36 — SIGNIFICANT CHANGE UP
HOROWITZ INDEX BLDV+IHG-RTO: 36 — SIGNIFICANT CHANGE UP
HOROWITZ INDEX BLDV+IHG-RTO: 44 — SIGNIFICANT CHANGE UP
IMM GRANULOCYTES NFR BLD AUTO: 0.5 % — SIGNIFICANT CHANGE UP (ref 0–0.9)
IMM GRANULOCYTES NFR BLD AUTO: 0.5 % — SIGNIFICANT CHANGE UP (ref 0–0.9)
LACTATE BLDV-MCNC: 3.9 MMOL/L — HIGH (ref 0.5–2)
LACTATE BLDV-MCNC: 3.9 MMOL/L — HIGH (ref 0.5–2)
LYMPHOCYTES # BLD AUTO: 0.59 K/UL — LOW (ref 1–3.3)
LYMPHOCYTES # BLD AUTO: 0.59 K/UL — LOW (ref 1–3.3)
LYMPHOCYTES # BLD AUTO: 6.2 % — LOW (ref 13–44)
LYMPHOCYTES # BLD AUTO: 6.2 % — LOW (ref 13–44)
MAGNESIUM SERPL-MCNC: 2.6 MG/DL — SIGNIFICANT CHANGE UP (ref 1.6–2.6)
MAGNESIUM SERPL-MCNC: 2.6 MG/DL — SIGNIFICANT CHANGE UP (ref 1.6–2.6)
MCHC RBC-ENTMCNC: 29.5 PG — SIGNIFICANT CHANGE UP (ref 27–34)
MCHC RBC-ENTMCNC: 29.5 PG — SIGNIFICANT CHANGE UP (ref 27–34)
MCHC RBC-ENTMCNC: 33 GM/DL — SIGNIFICANT CHANGE UP (ref 32–36)
MCHC RBC-ENTMCNC: 33 GM/DL — SIGNIFICANT CHANGE UP (ref 32–36)
MCV RBC AUTO: 89.6 FL — SIGNIFICANT CHANGE UP (ref 80–100)
MCV RBC AUTO: 89.6 FL — SIGNIFICANT CHANGE UP (ref 80–100)
MONOCYTES # BLD AUTO: 0.23 K/UL — SIGNIFICANT CHANGE UP (ref 0–0.9)
MONOCYTES # BLD AUTO: 0.23 K/UL — SIGNIFICANT CHANGE UP (ref 0–0.9)
MONOCYTES NFR BLD AUTO: 2.4 % — SIGNIFICANT CHANGE UP (ref 2–14)
MONOCYTES NFR BLD AUTO: 2.4 % — SIGNIFICANT CHANGE UP (ref 2–14)
NEUTROPHILS # BLD AUTO: 8.61 K/UL — HIGH (ref 1.8–7.4)
NEUTROPHILS # BLD AUTO: 8.61 K/UL — HIGH (ref 1.8–7.4)
NEUTROPHILS NFR BLD AUTO: 90.8 % — HIGH (ref 43–77)
NEUTROPHILS NFR BLD AUTO: 90.8 % — HIGH (ref 43–77)
NRBC # BLD: 0 /100 WBCS — SIGNIFICANT CHANGE UP (ref 0–0)
NRBC # BLD: 0 /100 WBCS — SIGNIFICANT CHANGE UP (ref 0–0)
PCO2 BLDV: 39 MMHG — LOW (ref 42–55)
PCO2 BLDV: 39 MMHG — LOW (ref 42–55)
PCO2 BLDV: 42 MMHG — SIGNIFICANT CHANGE UP (ref 42–55)
PH BLDV: 7.34 — SIGNIFICANT CHANGE UP (ref 7.32–7.43)
PH BLDV: 7.34 — SIGNIFICANT CHANGE UP (ref 7.32–7.43)
PH BLDV: 7.42 — SIGNIFICANT CHANGE UP (ref 7.32–7.43)
PH BLDV: 7.42 — SIGNIFICANT CHANGE UP (ref 7.32–7.43)
PH BLDV: 7.48 — HIGH (ref 7.32–7.43)
PH BLDV: 7.48 — HIGH (ref 7.32–7.43)
PHOSPHATE SERPL-MCNC: 2.2 MG/DL — LOW (ref 2.5–4.5)
PHOSPHATE SERPL-MCNC: 2.2 MG/DL — LOW (ref 2.5–4.5)
PLATELET # BLD AUTO: 145 K/UL — LOW (ref 150–400)
PLATELET # BLD AUTO: 145 K/UL — LOW (ref 150–400)
PO2 BLDV: 40 MMHG — SIGNIFICANT CHANGE UP (ref 25–45)
PO2 BLDV: 42 MMHG — SIGNIFICANT CHANGE UP (ref 25–45)
PO2 BLDV: 42 MMHG — SIGNIFICANT CHANGE UP (ref 25–45)
POTASSIUM BLDV-SCNC: 3.4 MMOL/L — LOW (ref 3.5–5.1)
POTASSIUM BLDV-SCNC: 3.4 MMOL/L — LOW (ref 3.5–5.1)
POTASSIUM SERPL-MCNC: 4.1 MMOL/L — SIGNIFICANT CHANGE UP (ref 3.5–5.3)
POTASSIUM SERPL-MCNC: 4.1 MMOL/L — SIGNIFICANT CHANGE UP (ref 3.5–5.3)
POTASSIUM SERPL-SCNC: 4.1 MMOL/L — SIGNIFICANT CHANGE UP (ref 3.5–5.3)
POTASSIUM SERPL-SCNC: 4.1 MMOL/L — SIGNIFICANT CHANGE UP (ref 3.5–5.3)
PROT SERPL-MCNC: 6 G/DL — SIGNIFICANT CHANGE UP (ref 6–8.3)
PROT SERPL-MCNC: 6 G/DL — SIGNIFICANT CHANGE UP (ref 6–8.3)
RBC # BLD: 2.98 M/UL — LOW (ref 4.2–5.8)
RBC # BLD: 2.98 M/UL — LOW (ref 4.2–5.8)
RBC # FLD: 15.1 % — HIGH (ref 10.3–14.5)
RBC # FLD: 15.1 % — HIGH (ref 10.3–14.5)
SAO2 % BLDV: 66.9 % — LOW (ref 67–88)
SAO2 % BLDV: 66.9 % — LOW (ref 67–88)
SAO2 % BLDV: 68.2 % — SIGNIFICANT CHANGE UP (ref 67–88)
SAO2 % BLDV: 68.2 % — SIGNIFICANT CHANGE UP (ref 67–88)
SAO2 % BLDV: 73.4 % — SIGNIFICANT CHANGE UP (ref 67–88)
SAO2 % BLDV: 73.4 % — SIGNIFICANT CHANGE UP (ref 67–88)
SODIUM SERPL-SCNC: 136 MMOL/L — SIGNIFICANT CHANGE UP (ref 135–145)
SODIUM SERPL-SCNC: 136 MMOL/L — SIGNIFICANT CHANGE UP (ref 135–145)
SURGICAL PATHOLOGY STUDY: SIGNIFICANT CHANGE UP
SURGICAL PATHOLOGY STUDY: SIGNIFICANT CHANGE UP
TACROLIMUS SERPL-MCNC: 10.7 NG/ML — SIGNIFICANT CHANGE UP
TACROLIMUS SERPL-MCNC: 10.7 NG/ML — SIGNIFICANT CHANGE UP
WBC # BLD: 9.49 K/UL — SIGNIFICANT CHANGE UP (ref 3.8–10.5)
WBC # BLD: 9.49 K/UL — SIGNIFICANT CHANGE UP (ref 3.8–10.5)
WBC # FLD AUTO: 9.49 K/UL — SIGNIFICANT CHANGE UP (ref 3.8–10.5)
WBC # FLD AUTO: 9.49 K/UL — SIGNIFICANT CHANGE UP (ref 3.8–10.5)

## 2023-12-29 PROCEDURE — 71045 X-RAY EXAM CHEST 1 VIEW: CPT | Mod: 26

## 2023-12-29 PROCEDURE — 99291 CRITICAL CARE FIRST HOUR: CPT

## 2023-12-29 PROCEDURE — 99233 SBSQ HOSP IP/OBS HIGH 50: CPT

## 2023-12-29 PROCEDURE — 93306 TTE W/DOPPLER COMPLETE: CPT | Mod: 26

## 2023-12-29 PROCEDURE — 99232 SBSQ HOSP IP/OBS MODERATE 35: CPT

## 2023-12-29 RX ORDER — METHYLNALTREXONE BROMIDE 12 MG/.6ML
12 INJECTION, SOLUTION SUBCUTANEOUS ONCE
Refills: 0 | Status: COMPLETED | OUTPATIENT
Start: 2023-12-29 | End: 2023-12-29

## 2023-12-29 RX ORDER — HYDRALAZINE HCL 50 MG
25 TABLET ORAL ONCE
Refills: 0 | Status: COMPLETED | OUTPATIENT
Start: 2023-12-29 | End: 2023-12-29

## 2023-12-29 RX ORDER — LACTULOSE 10 G/15ML
20 SOLUTION ORAL ONCE
Refills: 0 | Status: COMPLETED | OUTPATIENT
Start: 2023-12-29 | End: 2023-12-29

## 2023-12-29 RX ORDER — DEXTROSE 50 % IN WATER 50 %
25 SYRINGE (ML) INTRAVENOUS ONCE
Refills: 0 | Status: COMPLETED | OUTPATIENT
Start: 2023-12-29 | End: 2023-12-28

## 2023-12-29 RX ORDER — POTASSIUM CHLORIDE 20 MEQ
10 PACKET (EA) ORAL
Refills: 0 | Status: COMPLETED | OUTPATIENT
Start: 2023-12-29 | End: 2023-12-29

## 2023-12-29 RX ORDER — TACROLIMUS 5 MG/1
3 CAPSULE ORAL
Refills: 0 | Status: DISCONTINUED | OUTPATIENT
Start: 2023-12-29 | End: 2023-12-30

## 2023-12-29 RX ORDER — NIFEDIPINE 30 MG
60 TABLET, EXTENDED RELEASE 24 HR ORAL DAILY
Refills: 0 | Status: DISCONTINUED | OUTPATIENT
Start: 2023-12-29 | End: 2023-12-30

## 2023-12-29 RX ORDER — ACETAMINOPHEN 500 MG
1000 TABLET ORAL ONCE
Refills: 0 | Status: COMPLETED | OUTPATIENT
Start: 2023-12-29 | End: 2023-12-29

## 2023-12-29 RX ORDER — HYDRALAZINE HCL 50 MG
75 TABLET ORAL EVERY 8 HOURS
Refills: 0 | Status: DISCONTINUED | OUTPATIENT
Start: 2023-12-29 | End: 2023-12-30

## 2023-12-29 RX ORDER — SODIUM BICARBONATE 1 MEQ/ML
50 SYRINGE (ML) INTRAVENOUS
Refills: 0 | Status: DISCONTINUED | OUTPATIENT
Start: 2023-12-29 | End: 2023-12-30

## 2023-12-29 RX ADMIN — Medication 50 MILLIGRAM(S): at 21:13

## 2023-12-29 RX ADMIN — BUDESONIDE AND FORMOTEROL FUMARATE DIHYDRATE 1 PUFF(S): 160; 4.5 AEROSOL RESPIRATORY (INHALATION) at 05:16

## 2023-12-29 RX ADMIN — Medication 75 MILLIGRAM(S): at 21:14

## 2023-12-29 RX ADMIN — PANTOPRAZOLE SODIUM 40 MILLIGRAM(S): 20 TABLET, DELAYED RELEASE ORAL at 11:57

## 2023-12-29 RX ADMIN — NALOXEGOL OXALATE 25 MILLIGRAM(S): 12.5 TABLET, FILM COATED ORAL at 12:56

## 2023-12-29 RX ADMIN — Medication 500000 UNIT(S): at 12:57

## 2023-12-29 RX ADMIN — CHLORHEXIDINE GLUCONATE 1 APPLICATION(S): 213 SOLUTION TOPICAL at 07:01

## 2023-12-29 RX ADMIN — BASILIXIMAB 100 MILLIGRAM(S): 20 INJECTION, POWDER, FOR SOLUTION INTRAVENOUS at 18:08

## 2023-12-29 RX ADMIN — HEPARIN SODIUM 5000 UNIT(S): 5000 INJECTION INTRAVENOUS; SUBCUTANEOUS at 21:13

## 2023-12-29 RX ADMIN — HEPARIN SODIUM 5000 UNIT(S): 5000 INJECTION INTRAVENOUS; SUBCUTANEOUS at 13:35

## 2023-12-29 RX ADMIN — MYCOPHENOLATE MOFETIL 1000 MILLIGRAM(S): 250 CAPSULE ORAL at 08:19

## 2023-12-29 RX ADMIN — TRAMADOL HYDROCHLORIDE 25 MILLIGRAM(S): 50 TABLET ORAL at 05:19

## 2023-12-29 RX ADMIN — MYCOPHENOLATE MOFETIL 1000 MILLIGRAM(S): 250 CAPSULE ORAL at 19:42

## 2023-12-29 RX ADMIN — TRAMADOL HYDROCHLORIDE 25 MILLIGRAM(S): 50 TABLET ORAL at 05:49

## 2023-12-29 RX ADMIN — Medication 500000 UNIT(S): at 08:18

## 2023-12-29 RX ADMIN — INSULIN HUMAN 3 UNIT(S)/HR: 100 INJECTION, SOLUTION SUBCUTANEOUS at 19:39

## 2023-12-29 RX ADMIN — SIMETHICONE 160 MILLIGRAM(S): 80 TABLET, CHEWABLE ORAL at 21:13

## 2023-12-29 RX ADMIN — Medication 50 MILLIGRAM(S): at 05:20

## 2023-12-29 RX ADMIN — MUPIROCIN 1 APPLICATION(S): 20 OINTMENT TOPICAL at 05:22

## 2023-12-29 RX ADMIN — Medication 28 MILLIGRAM(S): at 17:44

## 2023-12-29 RX ADMIN — METHOCARBAMOL 500 MILLIGRAM(S): 500 TABLET, FILM COATED ORAL at 13:34

## 2023-12-29 RX ADMIN — Medication 1000 MILLIGRAM(S): at 12:34

## 2023-12-29 RX ADMIN — Medication 10 MILLIGRAM(S): at 13:35

## 2023-12-29 RX ADMIN — METHOCARBAMOL 500 MILLIGRAM(S): 500 TABLET, FILM COATED ORAL at 05:21

## 2023-12-29 RX ADMIN — Medication 50 MILLIEQUIVALENT(S): at 16:26

## 2023-12-29 RX ADMIN — Medication 63.75 MILLIMOLE(S): at 06:45

## 2023-12-29 RX ADMIN — BUMETANIDE 1 MILLIGRAM(S): 0.25 INJECTION INTRAMUSCULAR; INTRAVENOUS at 18:08

## 2023-12-29 RX ADMIN — Medication 10 MILLIGRAM(S): at 05:20

## 2023-12-29 RX ADMIN — Medication 50 MILLIEQUIVALENT(S): at 15:42

## 2023-12-29 RX ADMIN — Medication 50 MILLIEQUIVALENT(S): at 13:25

## 2023-12-29 RX ADMIN — Medication 400 MILLIGRAM(S): at 05:15

## 2023-12-29 RX ADMIN — METHYLNALTREXONE BROMIDE 12 MILLIGRAM(S): 12 INJECTION, SOLUTION SUBCUTANEOUS at 14:24

## 2023-12-29 RX ADMIN — SODIUM CHLORIDE 10 MILLILITER(S): 9 INJECTION INTRAMUSCULAR; INTRAVENOUS; SUBCUTANEOUS at 19:42

## 2023-12-29 RX ADMIN — TACROLIMUS 3 MILLIGRAM(S): 5 CAPSULE ORAL at 19:40

## 2023-12-29 RX ADMIN — TRAMADOL HYDROCHLORIDE 25 MILLIGRAM(S): 50 TABLET ORAL at 13:34

## 2023-12-29 RX ADMIN — METHOCARBAMOL 500 MILLIGRAM(S): 500 TABLET, FILM COATED ORAL at 21:12

## 2023-12-29 RX ADMIN — Medication 50 MILLIEQUIVALENT(S): at 15:39

## 2023-12-29 RX ADMIN — Medication 500000 UNIT(S): at 17:41

## 2023-12-29 RX ADMIN — Medication 25 MILLIGRAM(S): at 06:45

## 2023-12-29 RX ADMIN — Medication 1000 MILLIGRAM(S): at 05:30

## 2023-12-29 RX ADMIN — Medication 50 MILLIGRAM(S): at 13:34

## 2023-12-29 RX ADMIN — TRAMADOL HYDROCHLORIDE 25 MILLIGRAM(S): 50 TABLET ORAL at 21:14

## 2023-12-29 RX ADMIN — Medication 10 MILLIGRAM(S): at 21:12

## 2023-12-29 RX ADMIN — MUPIROCIN 1 APPLICATION(S): 20 OINTMENT TOPICAL at 17:44

## 2023-12-29 RX ADMIN — TRAMADOL HYDROCHLORIDE 25 MILLIGRAM(S): 50 TABLET ORAL at 14:34

## 2023-12-29 RX ADMIN — Medication 32 MILLIGRAM(S): at 05:19

## 2023-12-29 RX ADMIN — POLYETHYLENE GLYCOL 3350 17 GRAM(S): 17 POWDER, FOR SOLUTION ORAL at 12:57

## 2023-12-29 RX ADMIN — SENNA PLUS 2 TABLET(S): 8.6 TABLET ORAL at 21:14

## 2023-12-29 RX ADMIN — Medication 500000 UNIT(S): at 19:40

## 2023-12-29 RX ADMIN — TACROLIMUS 4 MILLIGRAM(S): 5 CAPSULE ORAL at 08:18

## 2023-12-29 RX ADMIN — Medication 650 MILLIGRAM(S): at 17:44

## 2023-12-29 RX ADMIN — BUMETANIDE 1 MILLIGRAM(S): 0.25 INJECTION INTRAMUSCULAR; INTRAVENOUS at 05:19

## 2023-12-29 RX ADMIN — Medication 650 MILLIGRAM(S): at 01:44

## 2023-12-29 RX ADMIN — HEPARIN SODIUM 5000 UNIT(S): 5000 INJECTION INTRAVENOUS; SUBCUTANEOUS at 05:20

## 2023-12-29 RX ADMIN — INSULIN HUMAN 3 UNIT(S)/HR: 100 INJECTION, SOLUTION SUBCUTANEOUS at 08:17

## 2023-12-29 RX ADMIN — Medication 75 MILLIGRAM(S): at 13:35

## 2023-12-29 RX ADMIN — Medication 400 MILLIGRAM(S): at 12:04

## 2023-12-29 RX ADMIN — NICARDIPINE HYDROCHLORIDE 15 MG/HR: 30 CAPSULE, EXTENDED RELEASE ORAL at 19:39

## 2023-12-29 RX ADMIN — NICARDIPINE HYDROCHLORIDE 15 MG/HR: 30 CAPSULE, EXTENDED RELEASE ORAL at 08:17

## 2023-12-29 RX ADMIN — TRAMADOL HYDROCHLORIDE 25 MILLIGRAM(S): 50 TABLET ORAL at 22:37

## 2023-12-29 RX ADMIN — Medication 60 MILLIGRAM(S): at 12:57

## 2023-12-29 RX ADMIN — LACTULOSE 20 GRAM(S): 10 SOLUTION ORAL at 21:12

## 2023-12-29 NOTE — PROGRESS NOTE ADULT - PROBLEM SELECTOR PLAN 5
- pretransplant was noted to have positive BCx staph epi, Enterococcus faecalis and Cutibacterium   - s/p IABP exchange from RFA to LFA on 11/20.  - appreciated transplant ID recs.  - post transplant has been afebrile

## 2023-12-29 NOTE — PROGRESS NOTE ADULT - SUBJECTIVE AND OBJECTIVE BOX
Seen earlier today in CTU     Chief Complaint: Diabetes Mellitus follow up    INTERVAL HX: Remain in CTU, tolerating POs with good oral intake. Remain insulin gtt with BGs 200s after breakfast, requiring 4-5 units  on Methylprednisolone taper.        Review of Systems:  General: As above  GI: No nausea, vomiting  Endocrine: no  S&Sx of hypoglycemia    Allergies    penicillins (Unknown)    Intolerances      MEDICATIONS  (STANDING):  acetaminophen     Tablet .. 650 milliGRAM(s) Oral every 6 hours  basiliximab  IVPB 20 milliGRAM(s) IV Intermittent once  budesonide  80 MICROgram(s)/formoterol 4.5 MICROgram(s) Inhaler 1 Puff(s) Inhalation two times a day  buMETAnide Injectable 1 milliGRAM(s) IV Push every 12 hours  chlorhexidine 4% Liquid 1 Application(s) Topical <User Schedule>  dextrose 50% Injectable 50 milliLiter(s) IV Push every 15 minutes  heparin   Injectable 5000 Unit(s) SubCutaneous every 8 hours  hydrALAZINE 75 milliGRAM(s) Oral every 8 hours  insulin regular Infusion 3 Unit(s)/Hr (3 mL/Hr) IV Continuous <Continuous>  lidocaine   4% Patch 3 Patch Transdermal daily  methocarbamol 500 milliGRAM(s) Oral every 8 hours  methylPREDNISolone sodium succinate Injectable 28 milliGRAM(s) IV Push every 12 hours  methylPREDNISolone sodium succinate Injectable   IV Push   metoclopramide Injectable 10 milliGRAM(s) IV Push every 8 hours  mupirocin 2% Ointment 1 Application(s) Topical two times a day  mycophenolate mofetil 1000 milliGRAM(s) Oral every 12 hours  naloxegol 25 milliGRAM(s) Oral daily  niCARdipine Infusion 3 mG/Hr (15 mL/Hr) IV Continuous <Continuous>  NIFEdipine XL 60 milliGRAM(s) Oral daily  nystatin    Suspension 100276 Unit(s) Oral <User Schedule>  pantoprazole  Injectable 40 milliGRAM(s) IV Push daily  polyethylene glycol 3350 17 Gram(s) Oral daily  potassium chloride  10 mEq/50 mL IVPB 10 milliEquivalent(s) IV Intermittent every 1 hour  pregabalin 50 milliGRAM(s) Oral every 8 hours  senna 2 Tablet(s) Oral at bedtime  simethicone 160 milliGRAM(s) Chew every 8 hours  sodium bicarbonate  Injectable 50 milliEquivalent(s) IV Push every 15 minutes  sodium chloride 0.9%. 1000 milliLiter(s) (10 mL/Hr) IV Continuous <Continuous>  sorbitol 70%/mineral oil/magnesium hydroxide/glycerin Enema 120 milliLiter(s) Rectal once  tacrolimus 4 milliGRAM(s) Oral <User Schedule>  traMADol 25 milliGRAM(s) Oral every 8 hours        dextrose 50% Injectable   25 milliLiter(s) IV Push (12-28-23 @ 22:15)    methylPREDNISolone sodium succinate Injectable   32 milliGRAM(s) IV Push (12-29-23 @ 05:19)   32 milliGRAM(s) IV Push (12-28-23 @ 18:45)        PHYSICAL EXAM:  VITALS: T(C): 36.1 (12-29-23 @ 04:00)  T(F): 97 (12-29-23 @ 04:00), Max: 97.2 (12-28-23 @ 20:00)  HR: 91 (12-29-23 @ 10:45) (51 - 92)  BP: 138/68 (12-29-23 @ 05:00) (113/55 - 140/71)  RR:  (12 - 33)  SpO2:  (77% - 100%)  Wt(kg): --  GENERAL: Male sitting in chair, in NAD  Respiratory: Respirations unlabored   Extremities: Warm, no edema  NEURO: Alert , appropriate     LABS:  POCT Blood Glucose.: 244 mg/dL (12-29-23 @ 14:58)  POCT Blood Glucose.: 329 mg/dL (12-29-23 @ 13:44)  POCT Blood Glucose.: 256 mg/dL (12-29-23 @ 11:51)  POCT Blood Glucose.: 199 mg/dL (12-29-23 @ 10:06)  POCT Blood Glucose.: 221 mg/dL (12-29-23 @ 09:03)  POCT Blood Glucose.: 214 mg/dL (12-29-23 @ 08:12)  POCT Blood Glucose.: 188 mg/dL (12-29-23 @ 06:45)  POCT Blood Glucose.: 202 mg/dL (12-29-23 @ 06:11)  POCT Blood Glucose.: 211 mg/dL (12-29-23 @ 06:09)  POCT Blood Glucose.: 141 mg/dL (12-29-23 @ 05:03)  POCT Blood Glucose.: 151 mg/dL (12-29-23 @ 04:08)  POCT Blood Glucose.: 122 mg/dL (12-29-23 @ 03:04)  POCT Blood Glucose.: 120 mg/dL (12-29-23 @ 00:44)  POCT Blood Glucose.: 150 mg/dL (12-29-23 @ 00:17)  POCT Blood Glucose.: 162 mg/dL (12-28-23 @ 22:46)  POCT Blood Glucose.: 71 mg/dL (12-28-23 @ 22:10)  POCT Blood Glucose.: 114 mg/dL (12-28-23 @ 20:46)  POCT Blood Glucose.: 135 mg/dL (12-28-23 @ 19:57)  POCT Blood Glucose.: 133 mg/dL (12-28-23 @ 18:58)  POCT Blood Glucose.: 173 mg/dL (12-28-23 @ 17:53)  POCT Blood Glucose.: 224 mg/dL (12-28-23 @ 16:24)  POCT Blood Glucose.: 196 mg/dL (12-28-23 @ 15:50)  POCT Blood Glucose.: 181 mg/dL (12-28-23 @ 14:57)  POCT Blood Glucose.: 170 mg/dL (12-28-23 @ 14:14)  POCT Blood Glucose.: 77 mg/dL (12-28-23 @ 12:58)  POCT Blood Glucose.: 112 mg/dL (12-28-23 @ 11:53)  POCT Blood Glucose.: 114 mg/dL (12-28-23 @ 11:01)  POCT Blood Glucose.: 110 mg/dL (12-28-23 @ 10:12)  POCT Blood Glucose.: 120 mg/dL (12-28-23 @ 09:14)  POCT Blood Glucose.: 109 mg/dL (12-28-23 @ 08:09)  POCT Blood Glucose.: 139 mg/dL (12-28-23 @ 06:52)  POCT Blood Glucose.: 145 mg/dL (12-28-23 @ 06:08)  POCT Blood Glucose.: 152 mg/dL (12-28-23 @ 05:10)  POCT Blood Glucose.: 156 mg/dL (12-28-23 @ 04:02)  POCT Blood Glucose.: 185 mg/dL (12-28-23 @ 02:52)  POCT Blood Glucose.: 177 mg/dL (12-28-23 @ 01:49)  POCT Blood Glucose.: 185 mg/dL (12-28-23 @ 00:55)  POCT Blood Glucose.: 198 mg/dL (12-28-23 @ 00:11)  POCT Blood Glucose.: 210 mg/dL (12-27-23 @ 22:51)  POCT Blood Glucose.: 218 mg/dL (12-27-23 @ 22:02)  POCT Blood Glucose.: 179 mg/dL (12-27-23 @ 20:40)  POCT Blood Glucose.: 143 mg/dL (12-27-23 @ 18:47)  POCT Blood Glucose.: 231 mg/dL (12-27-23 @ 17:50)  POCT Blood Glucose.: 224 mg/dL (12-27-23 @ 17:04)  POCT Blood Glucose.: 146 mg/dL (12-27-23 @ 15:12)  POCT Blood Glucose.: 187 mg/dL (12-27-23 @ 13:56)  POCT Blood Glucose.: 134 mg/dL (12-27-23 @ 13:19)  POCT Blood Glucose.: 213 mg/dL (12-27-23 @ 11:54)  POCT Blood Glucose.: 178 mg/dL (12-27-23 @ 10:51)  POCT Blood Glucose.: 194 mg/dL (12-27-23 @ 09:48)  POCT Blood Glucose.: 79 mg/dL (12-27-23 @ 08:51)  POCT Blood Glucose.: 109 mg/dL (12-27-23 @ 07:57)  POCT Blood Glucose.: 122 mg/dL (12-27-23 @ 06:41)  POCT Blood Glucose.: 131 mg/dL (12-27-23 @ 05:53)  POCT Blood Glucose.: 137 mg/dL (12-27-23 @ 04:46)  POCT Blood Glucose.: 139 mg/dL (12-27-23 @ 03:52)  POCT Blood Glucose.: 160 mg/dL (12-27-23 @ 03:02)  POCT Blood Glucose.: 159 mg/dL (12-27-23 @ 02:00)  POCT Blood Glucose.: 197 mg/dL (12-27-23 @ 00:42)  POCT Blood Glucose.: 207 mg/dL (12-26-23 @ 23:52)  POCT Blood Glucose.: 205 mg/dL (12-26-23 @ 23:07)  POCT Blood Glucose.: 172 mg/dL (12-26-23 @ 22:03)  POCT Blood Glucose.: 185 mg/dL (12-26-23 @ 20:48)  POCT Blood Glucose.: 191 mg/dL (12-26-23 @ 19:48)  POCT Blood Glucose.: 148 mg/dL (12-26-23 @ 18:42)  POCT Blood Glucose.: 95 mg/dL (12-26-23 @ 17:48)  POCT Blood Glucose.: 101 mg/dL (12-26-23 @ 16:44)  POCT Blood Glucose.: 103 mg/dL (12-26-23 @ 15:57)                          8.8    9.49  )-----------( 145      ( 29 Dec 2023 00:30 )             26.7     12-29    136  |  102  |  31<H>  ----------------------------<  155<H>  4.1   |  24  |  0.87    Ca    8.9      29 Dec 2023 00:30  Phos  2.2     12-29  Mg     2.6     12-29    TPro  6.0  /  Alb  3.8  /  TBili  0.4  /  DBili  x   /  AST  20  /  ALT  40  /  AlkPhos  45  12-29    eGFR: 99 mL/min/1.73m2 (29 Dec 2023 00:30)    11-10 Chol 130 Direct LDL -- LDL calculated 75 HDL 37<L> Trig 95, 11-03 Chol 198 Direct LDL -- LDL calculated 114<H> HDL 24<L> Trig 344<H>  Thyroid Function Tests:      A1C with Estimated Average Glucose Result: 8.3 % (11-01-23 @ 06:14)    Estimated Average Glucose: 192 mg/dL (11-01-23 @ 06:14)        Diet, Consistent Carbohydrate/No Snacks (12-27-23 @ 11:00) [Active]             Seen earlier today in CTU     Chief Complaint: Diabetes Mellitus follow up    INTERVAL HX: Remain in CTU, tolerating POs with good oral intake. Remain insulin gtt with BGs 200s after breakfast, requiring 4-5 units  on Methylprednisolone taper.        Review of Systems:  General: As above  GI: No nausea, vomiting  Endocrine: no  S&Sx of hypoglycemia    Allergies    penicillins (Unknown)    Intolerances      MEDICATIONS  (STANDING):  acetaminophen     Tablet .. 650 milliGRAM(s) Oral every 6 hours  basiliximab  IVPB 20 milliGRAM(s) IV Intermittent once  budesonide  80 MICROgram(s)/formoterol 4.5 MICROgram(s) Inhaler 1 Puff(s) Inhalation two times a day  buMETAnide Injectable 1 milliGRAM(s) IV Push every 12 hours  chlorhexidine 4% Liquid 1 Application(s) Topical <User Schedule>  dextrose 50% Injectable 50 milliLiter(s) IV Push every 15 minutes  heparin   Injectable 5000 Unit(s) SubCutaneous every 8 hours  hydrALAZINE 75 milliGRAM(s) Oral every 8 hours  insulin regular Infusion 3 Unit(s)/Hr (3 mL/Hr) IV Continuous <Continuous>  lidocaine   4% Patch 3 Patch Transdermal daily  methocarbamol 500 milliGRAM(s) Oral every 8 hours  methylPREDNISolone sodium succinate Injectable 28 milliGRAM(s) IV Push every 12 hours  methylPREDNISolone sodium succinate Injectable   IV Push   metoclopramide Injectable 10 milliGRAM(s) IV Push every 8 hours  mupirocin 2% Ointment 1 Application(s) Topical two times a day  mycophenolate mofetil 1000 milliGRAM(s) Oral every 12 hours  naloxegol 25 milliGRAM(s) Oral daily  niCARdipine Infusion 3 mG/Hr (15 mL/Hr) IV Continuous <Continuous>  NIFEdipine XL 60 milliGRAM(s) Oral daily  nystatin    Suspension 033925 Unit(s) Oral <User Schedule>  pantoprazole  Injectable 40 milliGRAM(s) IV Push daily  polyethylene glycol 3350 17 Gram(s) Oral daily  potassium chloride  10 mEq/50 mL IVPB 10 milliEquivalent(s) IV Intermittent every 1 hour  pregabalin 50 milliGRAM(s) Oral every 8 hours  senna 2 Tablet(s) Oral at bedtime  simethicone 160 milliGRAM(s) Chew every 8 hours  sodium bicarbonate  Injectable 50 milliEquivalent(s) IV Push every 15 minutes  sodium chloride 0.9%. 1000 milliLiter(s) (10 mL/Hr) IV Continuous <Continuous>  sorbitol 70%/mineral oil/magnesium hydroxide/glycerin Enema 120 milliLiter(s) Rectal once  tacrolimus 4 milliGRAM(s) Oral <User Schedule>  traMADol 25 milliGRAM(s) Oral every 8 hours        dextrose 50% Injectable   25 milliLiter(s) IV Push (12-28-23 @ 22:15)    methylPREDNISolone sodium succinate Injectable   32 milliGRAM(s) IV Push (12-29-23 @ 05:19)   32 milliGRAM(s) IV Push (12-28-23 @ 18:45)        PHYSICAL EXAM:  VITALS: T(C): 36.1 (12-29-23 @ 04:00)  T(F): 97 (12-29-23 @ 04:00), Max: 97.2 (12-28-23 @ 20:00)  HR: 91 (12-29-23 @ 10:45) (51 - 92)  BP: 138/68 (12-29-23 @ 05:00) (113/55 - 140/71)  RR:  (12 - 33)  SpO2:  (77% - 100%)  Wt(kg): --  GENERAL: Male sitting in chair, in NAD  Respiratory: Respirations unlabored   Extremities: Warm, no edema  NEURO: Alert , appropriate     LABS:  POCT Blood Glucose.: 244 mg/dL (12-29-23 @ 14:58)  POCT Blood Glucose.: 329 mg/dL (12-29-23 @ 13:44)  POCT Blood Glucose.: 256 mg/dL (12-29-23 @ 11:51)  POCT Blood Glucose.: 199 mg/dL (12-29-23 @ 10:06)  POCT Blood Glucose.: 221 mg/dL (12-29-23 @ 09:03)  POCT Blood Glucose.: 214 mg/dL (12-29-23 @ 08:12)  POCT Blood Glucose.: 188 mg/dL (12-29-23 @ 06:45)  POCT Blood Glucose.: 202 mg/dL (12-29-23 @ 06:11)  POCT Blood Glucose.: 211 mg/dL (12-29-23 @ 06:09)  POCT Blood Glucose.: 141 mg/dL (12-29-23 @ 05:03)  POCT Blood Glucose.: 151 mg/dL (12-29-23 @ 04:08)  POCT Blood Glucose.: 122 mg/dL (12-29-23 @ 03:04)  POCT Blood Glucose.: 120 mg/dL (12-29-23 @ 00:44)  POCT Blood Glucose.: 150 mg/dL (12-29-23 @ 00:17)  POCT Blood Glucose.: 162 mg/dL (12-28-23 @ 22:46)  POCT Blood Glucose.: 71 mg/dL (12-28-23 @ 22:10)  POCT Blood Glucose.: 114 mg/dL (12-28-23 @ 20:46)  POCT Blood Glucose.: 135 mg/dL (12-28-23 @ 19:57)  POCT Blood Glucose.: 133 mg/dL (12-28-23 @ 18:58)  POCT Blood Glucose.: 173 mg/dL (12-28-23 @ 17:53)  POCT Blood Glucose.: 224 mg/dL (12-28-23 @ 16:24)  POCT Blood Glucose.: 196 mg/dL (12-28-23 @ 15:50)  POCT Blood Glucose.: 181 mg/dL (12-28-23 @ 14:57)  POCT Blood Glucose.: 170 mg/dL (12-28-23 @ 14:14)  POCT Blood Glucose.: 77 mg/dL (12-28-23 @ 12:58)  POCT Blood Glucose.: 112 mg/dL (12-28-23 @ 11:53)  POCT Blood Glucose.: 114 mg/dL (12-28-23 @ 11:01)  POCT Blood Glucose.: 110 mg/dL (12-28-23 @ 10:12)  POCT Blood Glucose.: 120 mg/dL (12-28-23 @ 09:14)  POCT Blood Glucose.: 109 mg/dL (12-28-23 @ 08:09)  POCT Blood Glucose.: 139 mg/dL (12-28-23 @ 06:52)  POCT Blood Glucose.: 145 mg/dL (12-28-23 @ 06:08)  POCT Blood Glucose.: 152 mg/dL (12-28-23 @ 05:10)  POCT Blood Glucose.: 156 mg/dL (12-28-23 @ 04:02)  POCT Blood Glucose.: 185 mg/dL (12-28-23 @ 02:52)  POCT Blood Glucose.: 177 mg/dL (12-28-23 @ 01:49)  POCT Blood Glucose.: 185 mg/dL (12-28-23 @ 00:55)  POCT Blood Glucose.: 198 mg/dL (12-28-23 @ 00:11)  POCT Blood Glucose.: 210 mg/dL (12-27-23 @ 22:51)  POCT Blood Glucose.: 218 mg/dL (12-27-23 @ 22:02)  POCT Blood Glucose.: 179 mg/dL (12-27-23 @ 20:40)  POCT Blood Glucose.: 143 mg/dL (12-27-23 @ 18:47)  POCT Blood Glucose.: 231 mg/dL (12-27-23 @ 17:50)  POCT Blood Glucose.: 224 mg/dL (12-27-23 @ 17:04)  POCT Blood Glucose.: 146 mg/dL (12-27-23 @ 15:12)  POCT Blood Glucose.: 187 mg/dL (12-27-23 @ 13:56)  POCT Blood Glucose.: 134 mg/dL (12-27-23 @ 13:19)  POCT Blood Glucose.: 213 mg/dL (12-27-23 @ 11:54)  POCT Blood Glucose.: 178 mg/dL (12-27-23 @ 10:51)  POCT Blood Glucose.: 194 mg/dL (12-27-23 @ 09:48)  POCT Blood Glucose.: 79 mg/dL (12-27-23 @ 08:51)  POCT Blood Glucose.: 109 mg/dL (12-27-23 @ 07:57)  POCT Blood Glucose.: 122 mg/dL (12-27-23 @ 06:41)  POCT Blood Glucose.: 131 mg/dL (12-27-23 @ 05:53)  POCT Blood Glucose.: 137 mg/dL (12-27-23 @ 04:46)  POCT Blood Glucose.: 139 mg/dL (12-27-23 @ 03:52)  POCT Blood Glucose.: 160 mg/dL (12-27-23 @ 03:02)  POCT Blood Glucose.: 159 mg/dL (12-27-23 @ 02:00)  POCT Blood Glucose.: 197 mg/dL (12-27-23 @ 00:42)  POCT Blood Glucose.: 207 mg/dL (12-26-23 @ 23:52)  POCT Blood Glucose.: 205 mg/dL (12-26-23 @ 23:07)  POCT Blood Glucose.: 172 mg/dL (12-26-23 @ 22:03)  POCT Blood Glucose.: 185 mg/dL (12-26-23 @ 20:48)  POCT Blood Glucose.: 191 mg/dL (12-26-23 @ 19:48)  POCT Blood Glucose.: 148 mg/dL (12-26-23 @ 18:42)  POCT Blood Glucose.: 95 mg/dL (12-26-23 @ 17:48)  POCT Blood Glucose.: 101 mg/dL (12-26-23 @ 16:44)  POCT Blood Glucose.: 103 mg/dL (12-26-23 @ 15:57)                          8.8    9.49  )-----------( 145      ( 29 Dec 2023 00:30 )             26.7     12-29    136  |  102  |  31<H>  ----------------------------<  155<H>  4.1   |  24  |  0.87    Ca    8.9      29 Dec 2023 00:30  Phos  2.2     12-29  Mg     2.6     12-29    TPro  6.0  /  Alb  3.8  /  TBili  0.4  /  DBili  x   /  AST  20  /  ALT  40  /  AlkPhos  45  12-29    eGFR: 99 mL/min/1.73m2 (29 Dec 2023 00:30)    11-10 Chol 130 Direct LDL -- LDL calculated 75 HDL 37<L> Trig 95, 11-03 Chol 198 Direct LDL -- LDL calculated 114<H> HDL 24<L> Trig 344<H>  Thyroid Function Tests:      A1C with Estimated Average Glucose Result: 8.3 % (11-01-23 @ 06:14)    Estimated Average Glucose: 192 mg/dL (11-01-23 @ 06:14)        Diet, Consistent Carbohydrate/No Snacks (12-27-23 @ 11:00) [Active]

## 2023-12-29 NOTE — PROGRESS NOTE ADULT - ASSESSMENT
57 yo M with PMHx of HTN, CAD w/ 1 stent in 2009, ICH (2008) presented on 11/1 with abn ekg. Patient presented to [facility] where he was found to have STEMI, recommended to get cath however patient did not want to get it there so he left and came here.   EKG here with ST depression in lateral leads and elevation in anterior leads   Prior to C found to be tachycardic, dyspneic, intubated   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  Will need to start on valganciclovir when able X 6 months.    - Toxo -/-: none required  - PJP ppx: will introduce eventually   - Trush ppx: eventual Nystatin.  - donor HCV TIM( -) but HCV antibody +.  - on daptomycin ppx for hx of rash to vancomycin  - donor sputum + for staph but with would not influence risk of infection in heart recipient    #pretransplant E faecalis bacteremia in c/o colonoscopy  completed therapy    #Rash- to vancomycin?  resolved    # hep B and Hep A not immune  received vaccine series but second dose given early  will need repeat ab titers    # Bacteremia.   ·  Plan: BCx from 11/17 with GPC in pair/chains in both bottles; Mixed cultures Staph epi and Enterococcus faecalis . Repeat cultures from 11/18; NGTD  + rods in blood culture on 11/30 (reported on 12/4= cutibacterium), repeat BCxs Negative Completed abx course.   receiving Daptomycin perioperative prophylaxis      Recommendations  ·	Completed perioperative ppx cefepime and daptomycin for 48 hours (the latter already discontinued)  ·	Donor with sputum staphylococcus- in the absence of donor bacteremia would not affect heart recipient outcomes and would not need further prophylaxis for this  ·	Valcyte and bactrim ppx as soon as able        Thank you for involving us in the care of this patient  Transplant ID will continue to follow peripherally  Please call or page with additional questions  Pager; #[phone]  Teams: from 8 am to 5 pm  Brandi Silva MD     59 yo M with PMHx of HTN, CAD w/ 1 stent in 2009, ICH (2008) presented on 11/1 with abn ekg. Patient presented to [facility] where he was found to have STEMI, recommended to get cath however patient did not want to get it there so he left and came here.   EKG here with ST depression in lateral leads and elevation in anterior leads   Prior to C found to be tachycardic, dyspneic, intubated   [facility] 11/1 with chronic total occlusion of LAD and RCA, with elevated RA and PA pressures  TTE 11/1 with severely decreased EF 32%, s/p IABP 11/1        #s/p OHT 12/25/23 CMV D-/R+; Toxo D-/R-  Induction simulect and MPN  Maintenance tacro/MMF  ·  Plan: - CMV -/+: intermediate risk.  Will need to start on valganciclovir when able X 6 months.    - Toxo -/-: none required  - PJP ppx: will introduce eventually   - Trush ppx: eventual Nystatin.  - donor HCV TIM( -) but HCV antibody +.  - on daptomycin ppx for hx of rash to vancomycin  - donor sputum + for staph but with would not influence risk of infection in heart recipient    #pretransplant E faecalis bacteremia in c/o colonoscopy  completed therapy    #Rash- to vancomycin?  resolved    # hep B and Hep A not immune  received vaccine series but second dose given early  will need repeat ab titers    # Bacteremia.   ·  Plan: BCx from 11/17 with GPC in pair/chains in both bottles; Mixed cultures Staph epi and Enterococcus faecalis . Repeat cultures from 11/18; NGTD  + rods in blood culture on 11/30 (reported on 12/4= cutibacterium), repeat BCxs Negative Completed abx course.   receiving Daptomycin perioperative prophylaxis      Recommendations  ·	Completed perioperative ppx cefepime and daptomycin for 48 hours (the latter already discontinued)  ·	Donor with sputum staphylococcus- in the absence of donor bacteremia would not affect heart recipient outcomes and would not need further prophylaxis for this  ·	Valcyte and bactrim ppx as soon as able        Thank you for involving us in the care of this patient  Transplant ID will continue to follow peripherally  Please call or page with additional questions  Pager; #[phone]  Teams: from 8 am to 5 pm  Brandi Silva MD

## 2023-12-29 NOTE — PROGRESS NOTE ADULT - SUBJECTIVE AND OBJECTIVE BOX
INFECTIOUS DISEASES FOLLOW UP-    This is a follow up note for this  58yMale with  Non-ST elevation myocardial infarction (NSTEMI)    s/p OHTx on 12/25 and IABP removal on 12/26  on nasal cannula    no new complaints  Weaning pressors, inotropes    Vital Signs Last 24 Hrs  T(C): 36.1 (29 Dec 2023 04:00), Max: 36.2 (28 Dec 2023 20:00)  T(F): 97 (29 Dec 2023 04:00), Max: 97.2 (28 Dec 2023 20:00)  HR: 91 (29 Dec 2023 10:45) (51 - 92)  BP: 138/68 (29 Dec 2023 05:00) (113/55 - 140/71)  BP(mean): 93 (29 Dec 2023 05:00) (76 - 94)  RR: 25 (29 Dec 2023 10:45) (12 - 33)  SpO2: 97% (29 Dec 2023 10:45) (77% - 100%)    Parameters below as of 29 Dec 2023 05:39  Patient On (Oxygen Delivery Method): nasal cannula        24 hours:    12-28 @ 07:01  -  12-29 @ 07:00  --------------------------------------------------------  OUT:    Chest Tube (mL): 5 mL    Chest Tube (mL): 150 mL    Chest Tube (mL): 130 mL    Chest Tube (mL): 80 mL    Indwelling Catheter - Urethral (mL): 1475 mL    Voided (mL): 2375 mL  Total OUT: 4215 mL                    PHYSICAL EXAM:  Constitutional:no acute distress, sedated intubated  Eyes:MARVEL, EOMI  Ear/Nose/Throat: no oral lesions, right CVC/martin	  Respiratory: clear BL  chest tubes x4  Cardiovascular: S1S2 sternotomy dressing CDI  Gastrointestinal:soft, (+) BS, no tenderness  Extremities:no e/e/c  IABP femoral line remains in place  No Lymphadenopathy  IV sites not inflammed.  4 chest tubes with bloody fluid          ____________________________________________________  ROS  GENERAL: denies chills, , night sweats, weight loss.   PSYCH: denies depression, anxiety, suicidal ideation, hallucination, and delusions  SKIN: no rash or lesions; no color changes, no abnormal nevi,no  dryness, and nojaundice    EYES: denies visual changes, floaters, pain, inflammation, blurred vision, and discharge  ENT: denies tinnitus, vertigo, epistaxis, oral lesion, and decreased acuity  PULM: denies, hemoptysis, pleurisy  CVS: denies angina, palpitations,+ orthopnea, no syncope, or heart murmur  GI: denies constipation, diarrhea, melena, abdominal pain, nausea.   : denies dysuria, frequency, discharge, incontinence, stones or macroscopic hematuria  MS: no arthralgias, no erythema or swelling, no myalgias, noedema, or lower back pain.   CNS: denies numbness, dizziness, seizure, or tremor  ENDO: denies heat/cold intolerance, polyuria, polydipsia, malaise.    HEME: denies bruising, bleeding, lymphadenopathy, anemia, and calf pain    Allergies  penicillins (Unknown)    __________________________________________________  MEDS:  MEDICATIONS  (STANDING):  acetaminophen     Tablet .. 650 every 6 hours  basiliximab  IVPB 20 once  budesonide  80 MICROgram(s)/formoterol 4.5 MICROgram(s) Inhaler 1 two times a day  buMETAnide Injectable 1 every 12 hours  dextrose 50% Injectable 50 every 15 minutes  heparin   Injectable 5000 every 8 hours  hydrALAZINE 75 every 8 hours  insulin regular Infusion 3 <Continuous>  methocarbamol 500 every 8 hours  methylnaltrexone Injectable 12 once  methylPREDNISolone sodium succinate Injectable 28 every 12 hours  methylPREDNISolone sodium succinate Injectable    metoclopramide Injectable 10 every 8 hours  mycophenolate mofetil 1000 every 12 hours  naloxegol 25 daily  niCARdipine Infusion 3 <Continuous>  NIFEdipine XL 60 daily  pantoprazole  Injectable 40 daily  polyethylene glycol 3350 17 daily  pregabalin 50 every 8 hours  senna 2 at bedtime  simethicone 160 every 8 hours  tacrolimus 4 <User Schedule>  traMADol 25 every 8 hours    _________________________________________________  ANTIMICOBIALS  basiliximab  IVPB 20 once  mycophenolate mofetil 1000 every 12 hours  nystatin    Suspension 861215 <User Schedule>  tacrolimus 4 <User Schedule>      GENERAL LABS              8.8                  136  | 24   | 31           9.49  >-----------< 145     ------------------------< 155                   26.7                 4.1  | 102  | 0.87                                         Ca 8.9   Mg 2.6   Ph 2.2        Urinalysis Basic - ( 29 Dec 2023 00:30 )    Color: x / Appearance: x / SG: x / pH: x  Gluc: 155 mg/dL / Ketone: x  / Bili: x / Urobili: x   Blood: x / Protein: x / Nitrite: x   Leuk Esterase: x / RBC: x / WBC x   Sq Epi: x / Non Sq Epi: x / Bacteria: x        _________________________________________________  MICROBIOLOGY  -----------    Culture - Body Fluid with Gram Stain (collected 25 Dec 2023 15:28)  Source: .Body Fluid procurement fluid  Gram Stain (25 Dec 2023 21:08):    No polymorphonuclear cells seen    No organisms seen    by cytocentrifuge  Preliminary Report (26 Dec 2023 20:02):    No growth            Rapid RVP Result: NotDetec (12-24 @ 15:09)          Fungitell:   _______________________________________________  PERTINENT IMAGING   INFECTIOUS DISEASES FOLLOW UP-    This is a follow up note for this  58yMale with  Non-ST elevation myocardial infarction (NSTEMI)    s/p OHTx on 12/25 and IABP removal on 12/26  on nasal cannula    no new complaints  Weaning pressors, inotropes    Vital Signs Last 24 Hrs  T(C): 36.1 (29 Dec 2023 04:00), Max: 36.2 (28 Dec 2023 20:00)  T(F): 97 (29 Dec 2023 04:00), Max: 97.2 (28 Dec 2023 20:00)  HR: 91 (29 Dec 2023 10:45) (51 - 92)  BP: 138/68 (29 Dec 2023 05:00) (113/55 - 140/71)  BP(mean): 93 (29 Dec 2023 05:00) (76 - 94)  RR: 25 (29 Dec 2023 10:45) (12 - 33)  SpO2: 97% (29 Dec 2023 10:45) (77% - 100%)    Parameters below as of 29 Dec 2023 05:39  Patient On (Oxygen Delivery Method): nasal cannula        24 hours:    12-28 @ 07:01  -  12-29 @ 07:00  --------------------------------------------------------  OUT:    Chest Tube (mL): 5 mL    Chest Tube (mL): 150 mL    Chest Tube (mL): 130 mL    Chest Tube (mL): 80 mL    Indwelling Catheter - Urethral (mL): 1475 mL    Voided (mL): 2375 mL  Total OUT: 4215 mL                    PHYSICAL EXAM:  Constitutional:no acute distress, sedated intubated  Eyes:MARVEL, EOMI  Ear/Nose/Throat: no oral lesions, right CVC/martin	  Respiratory: clear BL  chest tubes x4  Cardiovascular: S1S2 sternotomy dressing CDI  Gastrointestinal:soft, (+) BS, no tenderness  Extremities:no e/e/c  IABP femoral line remains in place  No Lymphadenopathy  IV sites not inflammed.  4 chest tubes with bloody fluid          ____________________________________________________  ROS  GENERAL: denies chills, , night sweats, weight loss.   PSYCH: denies depression, anxiety, suicidal ideation, hallucination, and delusions  SKIN: no rash or lesions; no color changes, no abnormal nevi,no  dryness, and nojaundice    EYES: denies visual changes, floaters, pain, inflammation, blurred vision, and discharge  ENT: denies tinnitus, vertigo, epistaxis, oral lesion, and decreased acuity  PULM: denies, hemoptysis, pleurisy  CVS: denies angina, palpitations,+ orthopnea, no syncope, or heart murmur  GI: denies constipation, diarrhea, melena, abdominal pain, nausea.   : denies dysuria, frequency, discharge, incontinence, stones or macroscopic hematuria  MS: no arthralgias, no erythema or swelling, no myalgias, noedema, or lower back pain.   CNS: denies numbness, dizziness, seizure, or tremor  ENDO: denies heat/cold intolerance, polyuria, polydipsia, malaise.    HEME: denies bruising, bleeding, lymphadenopathy, anemia, and calf pain    Allergies  penicillins (Unknown)    __________________________________________________  MEDS:  MEDICATIONS  (STANDING):  acetaminophen     Tablet .. 650 every 6 hours  basiliximab  IVPB 20 once  budesonide  80 MICROgram(s)/formoterol 4.5 MICROgram(s) Inhaler 1 two times a day  buMETAnide Injectable 1 every 12 hours  dextrose 50% Injectable 50 every 15 minutes  heparin   Injectable 5000 every 8 hours  hydrALAZINE 75 every 8 hours  insulin regular Infusion 3 <Continuous>  methocarbamol 500 every 8 hours  methylnaltrexone Injectable 12 once  methylPREDNISolone sodium succinate Injectable 28 every 12 hours  methylPREDNISolone sodium succinate Injectable    metoclopramide Injectable 10 every 8 hours  mycophenolate mofetil 1000 every 12 hours  naloxegol 25 daily  niCARdipine Infusion 3 <Continuous>  NIFEdipine XL 60 daily  pantoprazole  Injectable 40 daily  polyethylene glycol 3350 17 daily  pregabalin 50 every 8 hours  senna 2 at bedtime  simethicone 160 every 8 hours  tacrolimus 4 <User Schedule>  traMADol 25 every 8 hours    _________________________________________________  ANTIMICOBIALS  basiliximab  IVPB 20 once  mycophenolate mofetil 1000 every 12 hours  nystatin    Suspension [id number] <User Schedule>  tacrolimus 4 <User Schedule>      GENERAL LABS              8.8                  136  | 24   | 31           9.49  >-----------< 145     ------------------------< 155                   26.7                 4.1  | 102  | 0.87                                         Ca 8.9   Mg 2.6   Ph 2.2        Urinalysis Basic - ( 29 Dec 2023 00:30 )    Color: x / Appearance: x / SG: x / pH: x  Gluc: 155 mg/dL / Ketone: x  / Bili: x / Urobili: x   Blood: x / Protein: x / Nitrite: x   Leuk Esterase: x / RBC: x / WBC x   Sq Epi: x / Non Sq Epi: x / Bacteria: x        _________________________________________________  MICROBIOLOGY  -----------    Culture - Body Fluid with Gram Stain (collected 25 Dec 2023 15:28)  Source: .Body Fluid procurement fluid  Gram Stain (25 Dec 2023 21:08):    No polymorphonuclear cells seen    No organisms seen    by cytocentrifuge  Preliminary Report (26 Dec 2023 20:02):    No growth            Rapid RVP Result: NotDetec (12-24 @ 15:09)          Fungitell:   _______________________________________________  PERTINENT IMAGING

## 2023-12-29 NOTE — CHART NOTE - NSCHARTNOTEFT_GEN_A_CORE
Nutrition Follow Up Note  Patient seen for: s/p heart transplant.   Chart reviewed, events noted.    Source: [x] Patient       [x] Medical Record      [x] RN        [] Family at bedside        [] Other:  - If unable to interview patient: [] Trach/Vent/BiPAP  [] Disoriented/confused/inappropriate to interview    Diet, Consistent Carbohydrate/No Snacks (23 @ 11:00) [Active]    Is current diet order appropriate/adequate? See recommendations below    PO intake:   [x] >75%  Adequate    [x] 50-75%  Fair       [] <50%  Poor   - Pt reports fair PO intake at this time  - Obtained food preferences from pt    Nutrition-related concerns:  - s/p heart transplant . Ordered for steroid taper, Simulect, tacrolimus, Cellcept  - Milrinone for inotropic support  - Stress Hyperglycemia; ordered for infusion of insulin   - Phos 2.2<L>; ordered for sodium phosphate IV for repletion    GI: Last BM: . Bowel Regimen? [x] Yes  [] No  -> Ordered for Reglan   -> Enema ordered    Weights:   Daily Weight in k.2 (12-29), Weight in k.1 (-28), Weight in k.8 (-27), Weight in k.3 (-26), Weight in k.6 (-24)  Wt history: Pt reports UBW ~180-190 pounds; reports recent weights likely elevated secondary to fluid retention; On/off diuresis.   RD will continue to trend as new wts available/able.     Drug Dosing Weight  Height (cm): 175.3 (24 Dec 2023 15:23)  Weight (kg): 98.9 (24 Dec 2023 15:23)  BMI (kg/m2): 32.2 (24 Dec 2023 15:23)-> recalculated based on low on daily wt of 87.4 kG (12-03) (28.4 kG/m2).  Reported UBW: ~81-86 kG    MEDICATIONS  (STANDING):  acetaminophen     Tablet .. 650 milliGRAM(s) Oral every 6 hours  acetaminophen   IVPB .. 1000 milliGRAM(s) IV Intermittent once  basiliximab  IVPB 20 milliGRAM(s) IV Intermittent once  budesonide  80 MICROgram(s)/formoterol 4.5 MICROgram(s) Inhaler 1 Puff(s) Inhalation two times a day  buMETAnide Injectable 1 milliGRAM(s) IV Push every 12 hours  chlorhexidine 4% Liquid 1 Application(s) Topical <User Schedule>  dextrose 50% Injectable 50 milliLiter(s) IV Push every 15 minutes  heparin   Injectable 5000 Unit(s) SubCutaneous every 8 hours  hydrALAZINE 75 milliGRAM(s) Oral every 8 hours  insulin regular Infusion 3 Unit(s)/Hr (3 mL/Hr) IV Continuous <Continuous>  lidocaine   4% Patch 3 Patch Transdermal daily  methocarbamol 500 milliGRAM(s) Oral every 8 hours  methylnaltrexone Injectable 12 milliGRAM(s) SubCutaneous once  methylPREDNISolone sodium succinate Injectable   IV Push   methylPREDNISolone sodium succinate Injectable 28 milliGRAM(s) IV Push every 12 hours  metoclopramide Injectable 10 milliGRAM(s) IV Push every 8 hours  milrinone Infusion 0.125 MICROgram(s)/kG/Min (3.71 mL/Hr) IV Continuous <Continuous>  mupirocin 2% Ointment 1 Application(s) Topical two times a day  mycophenolate mofetil 1000 milliGRAM(s) Oral every 12 hours  naloxegol 25 milliGRAM(s) Oral daily  niCARdipine Infusion 3 mG/Hr (15 mL/Hr) IV Continuous <Continuous>  nystatin    Suspension 869922 Unit(s) Oral <User Schedule>  pantoprazole  Injectable 40 milliGRAM(s) IV Push daily  polyethylene glycol 3350 17 Gram(s) Oral daily  pregabalin 50 milliGRAM(s) Oral every 8 hours  senna 2 Tablet(s) Oral at bedtime  simethicone 160 milliGRAM(s) Chew every 8 hours  sodium chloride 0.9%. 1000 milliLiter(s) (10 mL/Hr) IV Continuous <Continuous>  sorbitol 70%/mineral oil/magnesium hydroxide/glycerin Enema 120 milliLiter(s) Rectal once  tacrolimus 4 milliGRAM(s) Oral <User Schedule>  traMADol 25 milliGRAM(s) Oral every 8 hours    Pertinent Labs: 12-29 @ 00:30: Na 136, BUN 31<H>, Cr 0.87, <H>, K+ 4.1, Phos 2.2<L>, Mg 2.6, Alk Phos 45, ALT/SGPT 40, AST/SGOT 20, HbA1c --    A1C with Estimated Average Glucose Result: 8.3 % (23 @ 06:14)    Finger Sticks:  POCT Blood Glucose.: 199 mg/dL ( @ 10:06)  POCT Blood Glucose.: 221 mg/dL ( @ 09:03)  POCT Blood Glucose.: 214 mg/dL ( @ 08:12)  POCT Blood Glucose.: 188 mg/dL ( @ 06:45)  POCT Blood Glucose.: 202 mg/dL ( @ 06:11)  POCT Blood Glucose.: 211 mg/dL ( @ 06:09)  POCT Blood Glucose.: 141 mg/dL ( @ 05:03)  POCT Blood Glucose.: 151 mg/dL ( @ 04:08)  POCT Blood Glucose.: 122 mg/dL ( @ 03:04)  POCT Blood Glucose.: 120 mg/dL ( @ 00:44)  POCT Blood Glucose.: 150 mg/dL ( @ 00:17)  POCT Blood Glucose.: 162 mg/dL ( @ 22:46)  POCT Blood Glucose.: 71 mg/dL ( @ 22:10)  POCT Blood Glucose.: 114 mg/dL ( @ 20:46)  POCT Blood Glucose.: 135 mg/dL ( @ 19:57)  POCT Blood Glucose.: 133 mg/dL ( @ 18:58)  POCT Blood Glucose.: 173 mg/dL ( @ 17:53)  POCT Blood Glucose.: 224 mg/dL ( @ 16:24)  POCT Blood Glucose.: 196 mg/dL ( @ 15:50)  POCT Blood Glucose.: 181 mg/dL ( @ 14:57)  POCT Blood Glucose.: 170 mg/dL ( @ 14:14)  POCT Blood Glucose.: 77 mg/dL ( @ 12:58)  POCT Blood Glucose.: 112 mg/dL ( @ 11:53)  POCT Blood Glucose.: 114 mg/dL ( @ 11:01)    Skin per nursing documentation: No pressure injuries noted.  Edema per nursing documentation: 1+ generalized     Estimated Needs:   Based on low on daily wt of 87.4 kG (12-03) with consideration for increased nutrient needs:  Estimated Caloric Needs: (26-31 kcal/kg): 2282-5239 kcal  Estimated Protein Needs: (1.3-1.6 g/kg): 114-140 gm  Defer fluid needs to team.    Previous Nutrition Diagnosis: Increased nutrient needs  Nutrition Diagnosis is: [x] ongoing - being addressed with adequate oral intake    Previous Nutrition Diagnosis: Inadequate energy intake  Nutrition Diagnosis is: [x] ongoing; pt appetite has improved    Previous Nutrition Diagnosis: Food & Nutrition-Related Knowledge Deficit   Nutrition Diagnosis is: [x] ongoing - being addressed with ongoing diet education PRN    New Nutrition Diagnosis: [x] N/A    Nutrition Care Plan:  [x] In Progress  [] Achieved  [] Not applicable    Nutrition Interventions:     Education Provided:       [x] Yes: 1. Food safety education re-discussed as pt status post OHT with emphasis on cross contamination, importance of blood glucose control while on prednisone, drinking filtered water only, no ice during admission, proper food temperatures. No outside foods x 3-6 months. Discussed importance of adequate protein/energy intake. Pt declines oral nutrition supplement at this time. Pt was receptive to information and was able to use teach back points to verbalize understanding.     Recommendations:  1. Continue consistent carbohydrate diet as tolerated   -> Fluid needs deferred to provider. Consistency deferred to SLP and provider.   2. RD remains available to provide ongoing nutrition education PRN   3. Honor pt food preferences to promote adequate oral intake while in-house     Monitoring and Evaluation:   Continue to monitor nutritional intake, tolerance to diet prescription, weights, labs, skin integrity    RD remains available upon request and will follow up per protocol  Sultana Velazquez, MS, RD, CDN, CNSC avail. on [employer] Teams Nutrition Follow Up Note  Patient seen for: s/p heart transplant.   Chart reviewed, events noted.    Source: [x] Patient       [x] Medical Record      [x] RN        [] Family at bedside        [] Other:  - If unable to interview patient: [] Trach/Vent/BiPAP  [] Disoriented/confused/inappropriate to interview    Diet, Consistent Carbohydrate/No Snacks (23 @ 11:00) [Active]    Is current diet order appropriate/adequate? See recommendations below    PO intake:   [x] >75%  Adequate    [x] 50-75%  Fair       [] <50%  Poor   - Pt reports fair PO intake at this time  - Obtained food preferences from pt    Nutrition-related concerns:  - s/p heart transplant . Ordered for steroid taper, Simulect, tacrolimus, Cellcept  - Milrinone for inotropic support  - Stress Hyperglycemia; ordered for infusion of insulin   - Phos 2.2<L>; ordered for sodium phosphate IV for repletion    GI: Last BM: . Bowel Regimen? [x] Yes  [] No  -> Ordered for Reglan   -> Enema ordered    Weights:   Daily Weight in k.2 (12-29), Weight in k.1 (-28), Weight in k.8 (-27), Weight in k.3 (-26), Weight in k.6 (-24)  Wt history: Pt reports UBW ~180-190 pounds; reports recent weights likely elevated secondary to fluid retention; On/off diuresis.   RD will continue to trend as new wts available/able.     Drug Dosing Weight  Height (cm): 175.3 (24 Dec 2023 15:23)  Weight (kg): 98.9 (24 Dec 2023 15:23)  BMI (kg/m2): 32.2 (24 Dec 2023 15:23)-> recalculated based on low on daily wt of 87.4 kG (12-03) (28.4 kG/m2).  Reported UBW: ~81-86 kG    MEDICATIONS  (STANDING):  acetaminophen     Tablet .. 650 milliGRAM(s) Oral every 6 hours  acetaminophen   IVPB .. 1000 milliGRAM(s) IV Intermittent once  basiliximab  IVPB 20 milliGRAM(s) IV Intermittent once  budesonide  80 MICROgram(s)/formoterol 4.5 MICROgram(s) Inhaler 1 Puff(s) Inhalation two times a day  buMETAnide Injectable 1 milliGRAM(s) IV Push every 12 hours  chlorhexidine 4% Liquid 1 Application(s) Topical <User Schedule>  dextrose 50% Injectable 50 milliLiter(s) IV Push every 15 minutes  heparin   Injectable 5000 Unit(s) SubCutaneous every 8 hours  hydrALAZINE 75 milliGRAM(s) Oral every 8 hours  insulin regular Infusion 3 Unit(s)/Hr (3 mL/Hr) IV Continuous <Continuous>  lidocaine   4% Patch 3 Patch Transdermal daily  methocarbamol 500 milliGRAM(s) Oral every 8 hours  methylnaltrexone Injectable 12 milliGRAM(s) SubCutaneous once  methylPREDNISolone sodium succinate Injectable   IV Push   methylPREDNISolone sodium succinate Injectable 28 milliGRAM(s) IV Push every 12 hours  metoclopramide Injectable 10 milliGRAM(s) IV Push every 8 hours  milrinone Infusion 0.125 MICROgram(s)/kG/Min (3.71 mL/Hr) IV Continuous <Continuous>  mupirocin 2% Ointment 1 Application(s) Topical two times a day  mycophenolate mofetil 1000 milliGRAM(s) Oral every 12 hours  naloxegol 25 milliGRAM(s) Oral daily  niCARdipine Infusion 3 mG/Hr (15 mL/Hr) IV Continuous <Continuous>  nystatin    Suspension 172135 Unit(s) Oral <User Schedule>  pantoprazole  Injectable 40 milliGRAM(s) IV Push daily  polyethylene glycol 3350 17 Gram(s) Oral daily  pregabalin 50 milliGRAM(s) Oral every 8 hours  senna 2 Tablet(s) Oral at bedtime  simethicone 160 milliGRAM(s) Chew every 8 hours  sodium chloride 0.9%. 1000 milliLiter(s) (10 mL/Hr) IV Continuous <Continuous>  sorbitol 70%/mineral oil/magnesium hydroxide/glycerin Enema 120 milliLiter(s) Rectal once  tacrolimus 4 milliGRAM(s) Oral <User Schedule>  traMADol 25 milliGRAM(s) Oral every 8 hours    Pertinent Labs: 12-29 @ 00:30: Na 136, BUN 31<H>, Cr 0.87, <H>, K+ 4.1, Phos 2.2<L>, Mg 2.6, Alk Phos 45, ALT/SGPT 40, AST/SGOT 20, HbA1c --    A1C with Estimated Average Glucose Result: 8.3 % (23 @ 06:14)    Finger Sticks:  POCT Blood Glucose.: 199 mg/dL ( @ 10:06)  POCT Blood Glucose.: 221 mg/dL ( @ 09:03)  POCT Blood Glucose.: 214 mg/dL ( @ 08:12)  POCT Blood Glucose.: 188 mg/dL ( @ 06:45)  POCT Blood Glucose.: 202 mg/dL ( @ 06:11)  POCT Blood Glucose.: 211 mg/dL ( @ 06:09)  POCT Blood Glucose.: 141 mg/dL ( @ 05:03)  POCT Blood Glucose.: 151 mg/dL ( @ 04:08)  POCT Blood Glucose.: 122 mg/dL ( @ 03:04)  POCT Blood Glucose.: 120 mg/dL ( @ 00:44)  POCT Blood Glucose.: 150 mg/dL ( @ 00:17)  POCT Blood Glucose.: 162 mg/dL ( @ 22:46)  POCT Blood Glucose.: 71 mg/dL ( @ 22:10)  POCT Blood Glucose.: 114 mg/dL ( @ 20:46)  POCT Blood Glucose.: 135 mg/dL ( @ 19:57)  POCT Blood Glucose.: 133 mg/dL ( @ 18:58)  POCT Blood Glucose.: 173 mg/dL ( @ 17:53)  POCT Blood Glucose.: 224 mg/dL ( @ 16:24)  POCT Blood Glucose.: 196 mg/dL ( @ 15:50)  POCT Blood Glucose.: 181 mg/dL ( @ 14:57)  POCT Blood Glucose.: 170 mg/dL ( @ 14:14)  POCT Blood Glucose.: 77 mg/dL ( @ 12:58)  POCT Blood Glucose.: 112 mg/dL ( @ 11:53)  POCT Blood Glucose.: 114 mg/dL ( @ 11:01)    Skin per nursing documentation: No pressure injuries noted.  Edema per nursing documentation: 1+ generalized     Estimated Needs:   Based on low on daily wt of 87.4 kG (12-03) with consideration for increased nutrient needs:  Estimated Caloric Needs: (26-31 kcal/kg): 2744-8984 kcal  Estimated Protein Needs: (1.3-1.6 g/kg): 114-140 gm  Defer fluid needs to team.    Previous Nutrition Diagnosis: Increased nutrient needs  Nutrition Diagnosis is: [x] ongoing - being addressed with adequate oral intake    Previous Nutrition Diagnosis: Inadequate energy intake  Nutrition Diagnosis is: [x] ongoing; pt appetite has improved    Previous Nutrition Diagnosis: Food & Nutrition-Related Knowledge Deficit   Nutrition Diagnosis is: [x] ongoing - being addressed with ongoing diet education PRN    New Nutrition Diagnosis: [x] N/A    Nutrition Care Plan:  [x] In Progress  [] Achieved  [] Not applicable    Nutrition Interventions:     Education Provided:       [x] Yes: 1. Food safety education re-discussed as pt status post OHT with emphasis on cross contamination, importance of blood glucose control while on prednisone, drinking filtered water only, no ice during admission, proper food temperatures. No outside foods x 3-6 months. Discussed importance of adequate protein/energy intake. Pt declines oral nutrition supplement at this time. Pt was receptive to information and was able to use teach back points to verbalize understanding.     Recommendations:  1. Continue consistent carbohydrate diet as tolerated   -> Fluid needs deferred to provider. Consistency deferred to SLP and provider.   2. RD remains available to provide ongoing nutrition education PRN   3. Honor pt food preferences to promote adequate oral intake while in-house     Monitoring and Evaluation:   Continue to monitor nutritional intake, tolerance to diet prescription, weights, labs, skin integrity    RD remains available upon request and will follow up per protocol  Sultana Velazquez, MS, RD, CDN, CNSC avail. on [employer] Teams

## 2023-12-29 NOTE — PROGRESS NOTE ADULT - SUBJECTIVE AND OBJECTIVE BOX
Subjective:  - Feeling relatively well    Medications:  acetaminophen     Tablet .. 650 milliGRAM(s) Oral every 6 hours  basiliximab  IVPB 20 milliGRAM(s) IV Intermittent once  budesonide  80 MICROgram(s)/formoterol 4.5 MICROgram(s) Inhaler 1 Puff(s) Inhalation two times a day  buMETAnide Injectable 1 milliGRAM(s) IV Push every 12 hours  chlorhexidine 4% Liquid 1 Application(s) Topical <User Schedule>  dextrose 50% Injectable 50 milliLiter(s) IV Push every 15 minutes  heparin   Injectable 5000 Unit(s) SubCutaneous every 8 hours  hydrALAZINE 75 milliGRAM(s) Oral every 8 hours  insulin regular Infusion 3 Unit(s)/Hr IV Continuous <Continuous>  lidocaine   4% Patch 3 Patch Transdermal daily  methocarbamol 500 milliGRAM(s) Oral every 8 hours  methylnaltrexone Injectable 12 milliGRAM(s) SubCutaneous once  methylPREDNISolone sodium succinate Injectable   IV Push   methylPREDNISolone sodium succinate Injectable 28 milliGRAM(s) IV Push every 12 hours  metoclopramide Injectable 10 milliGRAM(s) IV Push every 8 hours  mupirocin 2% Ointment 1 Application(s) Topical two times a day  mycophenolate mofetil 1000 milliGRAM(s) Oral every 12 hours  naloxegol 25 milliGRAM(s) Oral daily  niCARdipine Infusion 3 mG/Hr IV Continuous <Continuous>  NIFEdipine XL 60 milliGRAM(s) Oral daily  nystatin    Suspension 259126 Unit(s) Oral <User Schedule>  pantoprazole  Injectable 40 milliGRAM(s) IV Push daily  polyethylene glycol 3350 17 Gram(s) Oral daily  pregabalin 50 milliGRAM(s) Oral every 8 hours  senna 2 Tablet(s) Oral at bedtime  simethicone 160 milliGRAM(s) Chew every 8 hours  sodium chloride 0.9%. 1000 milliLiter(s) IV Continuous <Continuous>  sorbitol 70%/mineral oil/magnesium hydroxide/glycerin Enema 120 milliLiter(s) Rectal once  tacrolimus 4 milliGRAM(s) Oral <User Schedule>  traMADol 25 milliGRAM(s) Oral every 8 hours    Physical Exam:    Vitals:  Vital Signs Last 24 Hours  T(C): 36.1 (23 @ 04:00), Max: 36.2 (23 @ 20:00)  HR: 91 (23 @ 10:45) (51 - 92)  BP: 138/68 (23 @ 05:00) (113/55 - 140/71)  RR: 25 (23 @ 10:45) (12 - 33)  SpO2: 97% (23 @ 10:45) (77% - 100%)    Weight in k.2 ( @ 00:00)    I&O's Summary    28 Dec 2023 07:  -  29 Dec 2023 07:00  --------------------------------------------------------  IN: 2803.9 mL / OUT: 4215 mL / NET: -1411.1 mL    29 Dec 2023 07:01  -  29 Dec 2023 14:12  --------------------------------------------------------  IN: 1192.8 mL / OUT: 1480 mL / NET: -287.2 mL    Tele: SR 80-90s    General: No distress. Comfortable in chair  HEENT: EOM intact.  Neck: Neck supple. JVP 8 cm H2O  Chest: Clear to auscultation bilaterally  CV: Normal S1 and S2. No murmurs, rub, or gallops. Radial pulses normal.  Abdomen: Soft, non-distended, non-tender  Extremities: Warm peripherally, no edema   Neurology: Alert and oriented times three. Sensation intact  Psych: Affect normal    Labs:                        8.8    9.49  )-----------( 145      ( 29 Dec 2023 00:30 )             26.7         136  |  102  |  31<H>  ----------------------------<  155<H>  4.1   |  24  |  0.87    Ca    8.9      29 Dec 2023 00:30  Phos  2.2       Mg     2.6         TPro  6.0  /  Alb  3.8  /  TBili  0.4  /  DBili  x   /  AST  20  /  ALT  40  /  AlkPhos  45      PT/INR - ( 28 Dec 2023 00:58 )   PT: 12.8 sec;   INR: 1.17 ratio         PTT - ( 28 Dec 2023 00:58 )  PTT:26.6 sec        Oxygen Saturation, Mixed: 67.7 ( @ 09:50)  Oxygen Saturation, Mixed: 69.6 ( @ 03:56)  Oxygen Saturation, Mixed: 74.9 ( @ 00:00)  Oxygen Saturation, Mixed: 69.5 ( @ 19:58)  Oxygen Saturation, Mixed: 74.5 ( @ 18:45)  Oxygen Saturation, Mixed: 76.3 ( @ 16:50)         Subjective:  - Feeling relatively well    Medications:  acetaminophen     Tablet .. 650 milliGRAM(s) Oral every 6 hours  basiliximab  IVPB 20 milliGRAM(s) IV Intermittent once  budesonide  80 MICROgram(s)/formoterol 4.5 MICROgram(s) Inhaler 1 Puff(s) Inhalation two times a day  buMETAnide Injectable 1 milliGRAM(s) IV Push every 12 hours  chlorhexidine 4% Liquid 1 Application(s) Topical <User Schedule>  dextrose 50% Injectable 50 milliLiter(s) IV Push every 15 minutes  heparin   Injectable 5000 Unit(s) SubCutaneous every 8 hours  hydrALAZINE 75 milliGRAM(s) Oral every 8 hours  insulin regular Infusion 3 Unit(s)/Hr IV Continuous <Continuous>  lidocaine   4% Patch 3 Patch Transdermal daily  methocarbamol 500 milliGRAM(s) Oral every 8 hours  methylnaltrexone Injectable 12 milliGRAM(s) SubCutaneous once  methylPREDNISolone sodium succinate Injectable   IV Push   methylPREDNISolone sodium succinate Injectable 28 milliGRAM(s) IV Push every 12 hours  metoclopramide Injectable 10 milliGRAM(s) IV Push every 8 hours  mupirocin 2% Ointment 1 Application(s) Topical two times a day  mycophenolate mofetil 1000 milliGRAM(s) Oral every 12 hours  naloxegol 25 milliGRAM(s) Oral daily  niCARdipine Infusion 3 mG/Hr IV Continuous <Continuous>  NIFEdipine XL 60 milliGRAM(s) Oral daily  nystatin    Suspension 175001 Unit(s) Oral <User Schedule>  pantoprazole  Injectable 40 milliGRAM(s) IV Push daily  polyethylene glycol 3350 17 Gram(s) Oral daily  pregabalin 50 milliGRAM(s) Oral every 8 hours  senna 2 Tablet(s) Oral at bedtime  simethicone 160 milliGRAM(s) Chew every 8 hours  sodium chloride 0.9%. 1000 milliLiter(s) IV Continuous <Continuous>  sorbitol 70%/mineral oil/magnesium hydroxide/glycerin Enema 120 milliLiter(s) Rectal once  tacrolimus 4 milliGRAM(s) Oral <User Schedule>  traMADol 25 milliGRAM(s) Oral every 8 hours    Physical Exam:    Vitals:  Vital Signs Last 24 Hours  T(C): 36.1 (23 @ 04:00), Max: 36.2 (23 @ 20:00)  HR: 91 (23 @ 10:45) (51 - 92)  BP: 138/68 (23 @ 05:00) (113/55 - 140/71)  RR: 25 (23 @ 10:45) (12 - 33)  SpO2: 97% (23 @ 10:45) (77% - 100%)    Weight in k.2 ( @ 00:00)    I&O's Summary    28 Dec 2023 07:  -  29 Dec 2023 07:00  --------------------------------------------------------  IN: 2803.9 mL / OUT: 4215 mL / NET: -1411.1 mL    29 Dec 2023 07:01  -  29 Dec 2023 14:12  --------------------------------------------------------  IN: 1192.8 mL / OUT: 1480 mL / NET: -287.2 mL    Tele: SR 80-90s    General: No distress. Comfortable in chair  HEENT: EOM intact.  Neck: Neck supple. JVP 8 cm H2O  Chest: Clear to auscultation bilaterally  CV: Normal S1 and S2. No murmurs, rub, or gallops. Radial pulses normal.  Abdomen: Soft, non-distended, non-tender  Extremities: Warm peripherally, no edema   Neurology: Alert and oriented times three. Sensation intact  Psych: Affect normal    Labs:                        8.8    9.49  )-----------( 145      ( 29 Dec 2023 00:30 )             26.7         136  |  102  |  31<H>  ----------------------------<  155<H>  4.1   |  24  |  0.87    Ca    8.9      29 Dec 2023 00:30  Phos  2.2       Mg     2.6         TPro  6.0  /  Alb  3.8  /  TBili  0.4  /  DBili  x   /  AST  20  /  ALT  40  /  AlkPhos  45      PT/INR - ( 28 Dec 2023 00:58 )   PT: 12.8 sec;   INR: 1.17 ratio         PTT - ( 28 Dec 2023 00:58 )  PTT:26.6 sec        Oxygen Saturation, Mixed: 67.7 ( @ 09:50)  Oxygen Saturation, Mixed: 69.6 ( @ 03:56)  Oxygen Saturation, Mixed: 74.9 ( @ 00:00)  Oxygen Saturation, Mixed: 69.5 ( @ 19:58)  Oxygen Saturation, Mixed: 74.5 ( @ 18:45)  Oxygen Saturation, Mixed: 76.3 ( @ 16:50)

## 2023-12-29 NOTE — PROGRESS NOTE ADULT - SUBJECTIVE AND OBJECTIVE BOX
Patient seen and examined at the bedside.    Remained critically ill on continuous ICU monitoring.      Brief Summary:  57yo M w/ PMHx HTN, CAD s/p stent (2009), ICH (2008), ICM now s/p OHT with IABP on 12/25/2023.      24 Hour events:       OBJECTIVE:  Vital Signs Last 24 Hrs  T(C): 36.1 (29 Dec 2023 04:00), Max: 36.2 (28 Dec 2023 20:00)  T(F): 97 (29 Dec 2023 04:00), Max: 97.2 (28 Dec 2023 20:00)  HR: 86 (29 Dec 2023 06:15) (51 - 87)  BP: 138/68 (29 Dec 2023 05:00) (113/55 - 140/71)  BP(mean): 93 (29 Dec 2023 05:00) (76 - 94)  RR: 24 (29 Dec 2023 06:15) (12 - 33)  SpO2: 95% (29 Dec 2023 06:00) (77% - 100%)    Parameters below as of 29 Dec 2023 05:39  Patient On (Oxygen Delivery Method): nasal cannula                    Physical Exam:   General: awake, out of bed to chair  Neurology: intact, AAOx4  Respiratory: on nasal cannula, non-labored respirations  CV: sinus rhythm  Abdominal: Soft, NT, distended  : +Llamas  Extremities: warm, moving all extremities  Skin: No Rashes, Hematoma, Ecchymosis        -------------------------------------------------------------------------------------------------------------------------------    Labs:                        8.8    9.49  )-----------( 145      ( 29 Dec 2023 00:30 )             26.7     12-29    136  |  102  |  31<H>  ----------------------------<  155<H>  4.1   |  24  |  0.87    Ca    8.9      29 Dec 2023 00:30  Phos  2.2     12-29  Mg     2.6     12-29    TPro  6.0  /  Alb  3.8  /  TBili  0.4  /  DBili  x   /  AST  20  /  ALT  40  /  AlkPhos  45  12-29    LIVER FUNCTIONS - ( 29 Dec 2023 00:30 )  Alb: 3.8 g/dL / Pro: 6.0 g/dL / ALK PHOS: 45 U/L / ALT: 40 U/L / AST: 20 U/L / GGT: x           PT/INR - ( 28 Dec 2023 00:58 )   PT: 12.8 sec;   INR: 1.17 ratio         PTT - ( 28 Dec 2023 00:58 )  PTT:26.6 sec  ABG - ( 29 Dec 2023 00:20 )  pH, Arterial: 7.47  pH, Blood: x     /  pCO2: 37    /  pO2: 109   / HCO3: 27    / Base Excess: 3.1   /  SaO2: 98.0              ------------------------------------------------------------------------------------------------------------------------------  Assessment:  57yo M w/ PMHx HTN, CAD s/p stent (2009), ICH (2008), ICM now s/p OHT with IABP on 12/25/2023.    S/p OHT on 12/25/2023  At risk for hemodynamic instability and cardiac arrhythmias  Post op respiratory insufficiency  Acute blood loss anemia  Thrombocytopenia    Plan:   ***Neuro***  - Post-operative pain control with Tylenol, tramadol, Lyrica, and Robaxin for pain.  - Optimize day/night cycles.  - OOBTC / PT as tolerated    ***Cardiovascular***  - At risk for hemodynamic instability and cardiac arrhythmias.  - Wean milrinone as tolerated.  - Hydralazine, Cardene for afterload reduction.  Wean cardene as tolerated.  - Monitor chest tube output.   - D/c'ed L pleural chest tube.  - D/c'ed central line.    ***Pulmonary***.  - Acute post-operative respiratory insufficiency  - on Nasal Cannula. Wean oxygen as able.  - Deep breathing and coughing exercises.  - Continue bronchodilator as needed.     ***GI***  - Continue diet.  - Protonix for stress ulcer prophylaxis   - Bowel regimen.  - Reglan for gut motility     ***Renal***  - Llamas catheter for strict I/O measurements.    - Replete electrolytes as indicated.  - Diuresis with Bumex  - Goal net negative 1L.    ***ID***  - Immunosuppression - tacro, cellcept, steroids.  - Nystatin for prophylaxis    ***Endocrine***  - Diabetes Mellitus 2  - Insulin gtt as needed to maintain euglycemia.      ***Hematology***  - Acute blood loss anemia  - SQ Heparin for DVT prophylaxis  - No current transfusion indication        Patient requires continuous monitoring with bedside rhythm monitoring, pulse oximetry monitoring, and continuous central venous and arterial pressure monitoring; and intermittent blood gas analysis. Care plan discussed with the ICU care team.   Patient remained critical, at risk for life threatening decompensation.    I have spent 30 minutes providing critical care management to this patient.    By signing my name below, I, Robert Ady, attest that this documentation has been prepared under the direction and in the presence of Tasha Shoemaker DO  Electronically signed: Robert Ady, 12-29-23 @ 06:49    I, Tasha Shoemaker DO, personally performed the services described in this documentation. all medical record entries made by the scribe were at my direction and in my presence. I have reviewed the chart and agree that the record reflects my personal performance and is accurate and complete  Electronically signed: Tasha Shoemaker DO, Patient seen and examined at the bedside.    Remained critically ill on continuous ICU monitoring.      Brief Summary:  59yo M w/ PMHx HTN, CAD s/p stent (2009), ICH (2008), ICM now s/p OHT with IABP on 12/25/2023.      24 Hour events:  - Started Valcyte for prophylaxis   - Need for Bowel movement: Giving enema today  - Failed primacor wean  - Hydral increased to 75 TID this AM  - Starting on Procardia     OBJECTIVE:  Vital Signs Last 24 Hrs  T(C): 36.1 (29 Dec 2023 04:00), Max: 36.2 (28 Dec 2023 20:00)  T(F): 97 (29 Dec 2023 04:00), Max: 97.2 (28 Dec 2023 20:00)  HR: 86 (29 Dec 2023 06:15) (51 - 87)  BP: 138/68 (29 Dec 2023 05:00) (113/55 - 140/71)  BP(mean): 93 (29 Dec 2023 05:00) (76 - 94)  RR: 24 (29 Dec 2023 06:15) (12 - 33)  SpO2: 95% (29 Dec 2023 06:00) (77% - 100%)    Parameters below as of 29 Dec 2023 05:39  Patient On (Oxygen Delivery Method): nasal cannula                    Physical Exam:   General: awake, out of bed to chair  Neurology: intact, AAOx4  Respiratory: on nasal cannula, non-labored respirations  CV: sinus rhythm  Abdominal: Soft, NT, distended  : +Llamas  Extremities: warm, moving all extremities  Skin: No Rashes, Hematoma, Ecchymosis        -------------------------------------------------------------------------------------------------------------------------------    Labs:                        8.8    9.49  )-----------( 145      ( 29 Dec 2023 00:30 )             26.7     12-29    136  |  102  |  31<H>  ----------------------------<  155<H>  4.1   |  24  |  0.87    Ca    8.9      29 Dec 2023 00:30  Phos  2.2     12-29  Mg     2.6     12-29    TPro  6.0  /  Alb  3.8  /  TBili  0.4  /  DBili  x   /  AST  20  /  ALT  40  /  AlkPhos  45  12-29    LIVER FUNCTIONS - ( 29 Dec 2023 00:30 )  Alb: 3.8 g/dL / Pro: 6.0 g/dL / ALK PHOS: 45 U/L / ALT: 40 U/L / AST: 20 U/L / GGT: x           PT/INR - ( 28 Dec 2023 00:58 )   PT: 12.8 sec;   INR: 1.17 ratio         PTT - ( 28 Dec 2023 00:58 )  PTT:26.6 sec  ABG - ( 29 Dec 2023 00:20 )  pH, Arterial: 7.47  pH, Blood: x     /  pCO2: 37    /  pO2: 109   / HCO3: 27    / Base Excess: 3.1   /  SaO2: 98.0              ------------------------------------------------------------------------------------------------------------------------------  Assessment:  59yo M w/ PMHx HTN, CAD s/p stent (2009), ICH (2008), ICM now s/p OHT with IABP on 12/25/2023.    S/p OHT on 12/25/2023  At risk for hemodynamic instability and cardiac arrhythmias  Post op respiratory insufficiency  Acute blood loss anemia  Thrombocytopenia    Plan:   ***Neuro***  - Post-operative pain control with Tylenol, tramadol, Lyrica, and Robaxin for pain.  - Optimize day/night cycles.  - OOBTC / PT as tolerated    ***Cardiovascular***  - At risk for hemodynamic instability and cardiac arrhythmias.  - Wean milrinone as tolerated.  - Hydralazine, Cardene for afterload reduction.  Wean cardene as tolerated.  - Hydral increased to 75 TID this AM  - Starting on Procardia   - Monitor chest tube output.   - D/c'ed L pleural chest tube.  - D/c'ed central line.    ***Pulmonary***.  - Acute post-operative respiratory insufficiency  - on Nasal Cannula. Wean oxygen as able.  - Deep breathing and coughing exercises.  - Continue bronchodilator as needed.     ***GI***  - Continue diet.  - Protonix for stress ulcer prophylaxis   - Bowel regimen.  - Reglan for gut motility   - Need for Bowel movement: Giving enema today    ***Renal***  - Llamas catheter for strict I/O measurements.    - Replete electrolytes as indicated.  - Diuresis with Bumex  - Goal net negative 1L.    ***ID***  - Immunosuppression - tacro, cellcept, steroids.  - Nystatin for prophylaxis  - Started Valcyte for prophylaxis     ***Endocrine***  - Diabetes Mellitus 2  - Insulin gtt as needed to maintain euglycemia.      ***Hematology***  - Acute blood loss anemia  - SQ Heparin for DVT prophylaxis  - No current transfusion indication        Patient requires continuous monitoring with bedside rhythm monitoring, pulse oximetry monitoring, and continuous central venous and arterial pressure monitoring; and intermittent blood gas analysis. Care plan discussed with the ICU care team.   Patient remained critical, at risk for life threatening decompensation.    I have spent 30 minutes providing critical care management to this patient.    By signing my name below, I, Robertmannie Garsia, attest that this documentation has been prepared under the direction and in the presence of Tasha Shoemaker DO  Electronically signed: Robert Garsia, 12-29-23 @ 06:49    I, Tasha Shoemaker DO, personally performed the services described in this documentation. all medical record entries made by the scribe were at my direction and in my presence. I have reviewed the chart and agree that the record reflects my personal performance and is accurate and complete  Electronically signed: Tasha Shoemaker DO, Patient seen and examined at the bedside.    Remained critically ill on continuous ICU monitoring.      Brief Summary:  57 yo M w/ PMHx HTN, CAD s/p stent (2009), ICH (2008), ICM now s/p OHT with IABP on 12/25/2023.      24 Hour events:  - No acute events overnight.    OBJECTIVE:  Vital Signs Last 24 Hrs  T(C): 36.1 (29 Dec 2023 04:00), Max: 36.2 (28 Dec 2023 20:00)  T(F): 97 (29 Dec 2023 04:00), Max: 97.2 (28 Dec 2023 20:00)  HR: 86 (29 Dec 2023 06:15) (51 - 87)  BP: 138/68 (29 Dec 2023 05:00) (113/55 - 140/71)  BP(mean): 93 (29 Dec 2023 05:00) (76 - 94)  RR: 24 (29 Dec 2023 06:15) (12 - 33)  SpO2: 95% (29 Dec 2023 06:00) (77% - 100%)    Parameters below as of 29 Dec 2023 05:39  Patient On (Oxygen Delivery Method): nasal cannula      Physical Exam:   General: awake, OOBTC  Neurology: AAOx4  Respiratory: on NC, non-labored respirations  CV: NSR  Abdominal: Soft, nontender  Extremities: warm, MOISE  Skin: No Rashes, Hematoma, Ecchymosis        -------------------------------------------------------------------------------------------------------------------------------    Labs:                        8.8    9.49  )-----------( 145      ( 29 Dec 2023 00:30 )             26.7     12-29    136  |  102  |  31<H>  ----------------------------<  155<H>  4.1   |  24  |  0.87    Ca    8.9      29 Dec 2023 00:30  Phos  2.2     12-29  Mg     2.6     12-29    TPro  6.0  /  Alb  3.8  /  TBili  0.4  /  DBili  x   /  AST  20  /  ALT  40  /  AlkPhos  45  12-29    LIVER FUNCTIONS - ( 29 Dec 2023 00:30 )  Alb: 3.8 g/dL / Pro: 6.0 g/dL / ALK PHOS: 45 U/L / ALT: 40 U/L / AST: 20 U/L / GGT: x           PT/INR - ( 28 Dec 2023 00:58 )   PT: 12.8 sec;   INR: 1.17 ratio         PTT - ( 28 Dec 2023 00:58 )  PTT:26.6 sec  ABG - ( 29 Dec 2023 00:20 )  pH, Arterial: 7.47  pH, Blood: x     /  pCO2: 37    /  pO2: 109   / HCO3: 27    / Base Excess: 3.1   /  SaO2: 98.0              ------------------------------------------------------------------------------------------------------------------------------  Assessment:  57yo M w/ PMHx HTN, CAD s/p stent (2009), ICH (2008), ICM now s/p OHT with IABP on 12/25/2023.    S/p OHT on 12/25/2023  At risk for hemodynamic instability and cardiac arrhythmias  Post op respiratory insufficiency  Acute blood loss anemia  Thrombocytopenia    Plan:   ***Neuro***  - Post-operative pain control with Tylenol, tramadol, Lyrica, and Robaxin for pain.  - Optimize day/night cycles.  - OOBTC / PT as tolerated    ***Cardiovascular***  - At risk for hemodynamic instability and cardiac arrhythmias.  - Wean milrinone to off.  - Hydralazine, Cardene for afterload reduction.  Wean cardene as tolerated.  - Hydral increased to 75 TID this AM  - Start Procardia for additional afterload reduction.  - Monitor chest tube output.     ***Pulmonary***.  - Acute post-operative respiratory insufficiency  - on Nasal Cannula. Wean oxygen as able.  - Deep breathing and coughing exercises.  - Continue bronchodilator as needed.     ***GI***  - Continue diet.  - Protonix for stress ulcer prophylaxis   - Bowel regimen.    ***Renal***  - Llamas catheter for strict I/O measurements.    - Replete electrolytes as indicated.  - Diuresis with Bumex  - Goal net negative 1L.    ***ID***  - Immunosuppression - tacro, cellcept, steroids. Simulect today.  - Nystatin for prophylaxis  - Started Valcyte for prophylaxis     ***Endocrine***  - Diabetes Mellitus 2  - Insulin gtt as needed to maintain euglycemia.      ***Hematology***  - Acute blood loss anemia  - SQ Heparin for DVT prophylaxis  - No current transfusion indication        Patient requires continuous monitoring with bedside rhythm monitoring, pulse oximetry monitoring, and continuous central venous and arterial pressure monitoring; and intermittent blood gas analysis. Care plan discussed with the ICU care team.   Patient remained critical, at risk for life threatening decompensation.    I have spent 30 minutes providing critical care management to this patient.    By signing my name below, I, Robert Ady, attest that this documentation has been prepared under the direction and in the presence of Tasha Shoemaker DO  Electronically signed: Robert Ady, 12-29-23 @ 06:49    I, Tasha Shoemaker DO, personally performed the services described in this documentation. all medical record entries made by the scribe were at my direction and in my presence. I have reviewed the chart and agree that the record reflects my personal performance and is accurate and complete  Electronically signed: Tasha Shoemaker DO, Patient seen and examined at the bedside.    Remained critically ill on continuous ICU monitoring.      Brief Summary:  57 yo M w/ PMHx HTN, CAD s/p stent (2009), ICH (2008), ICM now s/p OHT with IABP on 12/25/2023.      24 Hour events:  - No acute events overnight.    OBJECTIVE:  Vital Signs Last 24 Hrs  T(C): 36.1 (29 Dec 2023 04:00), Max: 36.2 (28 Dec 2023 20:00)  T(F): 97 (29 Dec 2023 04:00), Max: 97.2 (28 Dec 2023 20:00)  HR: 86 (29 Dec 2023 06:15) (51 - 87)  BP: 138/68 (29 Dec 2023 05:00) (113/55 - 140/71)  BP(mean): 93 (29 Dec 2023 05:00) (76 - 94)  RR: 24 (29 Dec 2023 06:15) (12 - 33)  SpO2: 95% (29 Dec 2023 06:00) (77% - 100%)    Parameters below as of 29 Dec 2023 05:39  Patient On (Oxygen Delivery Method): nasal cannula      Physical Exam:   General: awake, OOBTC  Neurology: AAOx4  Respiratory: on NC, non-labored respirations  CV: NSR  Abdominal: Soft, nontender  Extremities: warm, MOISE  Skin: No Rashes, Hematoma, Ecchymosis        -------------------------------------------------------------------------------------------------------------------------------    Labs:                        8.8    9.49  )-----------( 145      ( 29 Dec 2023 00:30 )             26.7     12-29    136  |  102  |  31<H>  ----------------------------<  155<H>  4.1   |  24  |  0.87    Ca    8.9      29 Dec 2023 00:30  Phos  2.2     12-29  Mg     2.6     12-29    TPro  6.0  /  Alb  3.8  /  TBili  0.4  /  DBili  x   /  AST  20  /  ALT  40  /  AlkPhos  45  12-29    LIVER FUNCTIONS - ( 29 Dec 2023 00:30 )  Alb: 3.8 g/dL / Pro: 6.0 g/dL / ALK PHOS: 45 U/L / ALT: 40 U/L / AST: 20 U/L / GGT: x           PT/INR - ( 28 Dec 2023 00:58 )   PT: 12.8 sec;   INR: 1.17 ratio         PTT - ( 28 Dec 2023 00:58 )  PTT:26.6 sec  ABG - ( 29 Dec 2023 00:20 )  pH, Arterial: 7.47  pH, Blood: x     /  pCO2: 37    /  pO2: 109   / HCO3: 27    / Base Excess: 3.1   /  SaO2: 98.0              ------------------------------------------------------------------------------------------------------------------------------  Assessment:  59yo M w/ PMHx HTN, CAD s/p stent (2009), ICH (2008), ICM now s/p OHT with IABP on 12/25/2023.    S/p OHT on 12/25/2023  At risk for hemodynamic instability and cardiac arrhythmias  Post op respiratory insufficiency  Acute blood loss anemia  Thrombocytopenia    Plan:   ***Neuro***  - Post-operative pain control with Tylenol, tramadol, Lyrica, and Robaxin for pain.  - Optimize day/night cycles.  - OOBTC / PT as tolerated    ***Cardiovascular***  - At risk for hemodynamic instability and cardiac arrhythmias.  - Wean milrinone to off.  - Hydralazine, Cardene for afterload reduction.  Wean cardene as tolerated.  - Hydral increased to 75 TID this AM  - Start Procardia for additional afterload reduction.  - Monitor chest tube output.     ***Pulmonary***.  - Acute post-operative respiratory insufficiency  - on Nasal Cannula. Wean oxygen as able.  - Deep breathing and coughing exercises.  - Continue bronchodilator as needed.     ***GI***  - Continue diet.  - Protonix for stress ulcer prophylaxis   - Bowel regimen.    ***Renal***  - Llamas catheter for strict I/O measurements.    - Replete electrolytes as indicated.  - Diuresis with Bumex  - Goal net negative 1L.    ***ID***  - Immunosuppression - tacro, cellcept, steroids. Simulect today.  - Nystatin for prophylaxis  - Started Valcyte for prophylaxis     ***Endocrine***  - Diabetes Mellitus 2  - Insulin gtt as needed to maintain euglycemia.      ***Hematology***  - Acute blood loss anemia  - SQ Heparin for DVT prophylaxis  - No current transfusion indication        Patient requires continuous monitoring with bedside rhythm monitoring, pulse oximetry monitoring, and continuous central venous and arterial pressure monitoring; and intermittent blood gas analysis. Care plan discussed with the ICU care team.   Patient remained critical, at risk for life threatening decompensation.    I have spent 30 minutes providing critical care management to this patient.    By signing my name below, I, Robert Ady, attest that this documentation has been prepared under the direction and in the presence of Tasha Shoemaker DO  Electronically signed: Robert Ady, 12-29-23 @ 06:49    I, Tasha Shoemaker DO, personally performed the services described in this documentation. all medical record entries made by the scribe were at my direction and in my presence. I have reviewed the chart and agree that the record reflects my personal performance and is accurate and complete  Electronically signed: Tasha Shoemaker DO,

## 2023-12-29 NOTE — PROGRESS NOTE ADULT - PROBLEM SELECTOR PLAN 1
- s/p OHT on 12/25. Ischemic Time: 253min  - IABP removed 12/26  - Sabrina and Epi weaned off 12/26  - Stopping Milrinone 0.125 mcg off. Follow perfusion indicis via central line   - Currently on Cardene gtt; wean as tolerates   - Stop HZN 75 mg TID and transition to Procardia XL 60 mg QD  - Target net negative 1-2L balance  - 1st RHC/EMBx on Tuesday 1/2  - Please keep primary Dr. Banuelos [phone] in the loop per family request. - s/p OHT on 12/25. Ischemic Time: 253min  - IABP removed 12/26  - Sabrina and Epi weaned off 12/26  - Stopping Milrinone 0.125 mcg off. Follow perfusion indicis via central line   - Currently on Cardene gtt; wean as tolerates   - Stop HZN 75 mg TID and transition to Procardia XL 60 mg QD  - Target net negative 1-2L balance  - 1st RHC/EMBx on Tuesday 1/2  - Please keep primary Dr. Banuelos [phone] in the loop per family request.

## 2023-12-29 NOTE — PROGRESS NOTE ADULT - PROBLEM SELECTOR PLAN 3
- CMV -/+: intermediate risk.  Plan to start Valcyte in upcoming days  - Toxo -/-: none required  - PJP ppx: plan to start bactrim or mepron in e upcoming days, will be based off renal function   - Trush ppx: continue Nystatin S/S q6 hours   - donor HCV TIM - but HCV antibody +.  - on daptomycin ppx for hx of rash to vancomycin and donor sputum + for staph.

## 2023-12-29 NOTE — PROGRESS NOTE ADULT - SUBJECTIVE AND OBJECTIVE BOX
LD VALENCIA  58y Male  MRN:[id number]    Patient is a 58y old  Male who presents with a chief complaint of NSTEMI (01 Nov 2023 20:29)    HPI:  59yo M w/ hx HTN, CAD w/ 1 stent in 2009, ICH (2008) presenting with abn ekg. Patient presented to [facility] where he was found to have STEMI, recommended to get cath however patient did not want to get it there so it left and came here.  Patient initially had cough, congestion, fever, was placed on antibiotics on Sunday.  Started feeling nauseous and had a presyncopal event after which he presented to ED last night.  Had chest pain as well.  Chest pain is midsternal.  Not currently having chest pain.  Received 4 aspirin 30 min pta. (01 Nov 2023 15:11)      Patient seen and evaluated at bedside in CTU. interval events noted    Interval HPI: sitting in chair. no acute events o/n     PAST MEDICAL & SURGICAL HISTORY:  HTN (hypertension)      CAD (coronary artery disease)  2009; stent      Intracranial hemorrhage  2008      Respiratory arrest  december 1st      Myocardial infarction, unspecified MI type, unspecified artery      History of coronary artery stent placement          REVIEW OF SYSTEMS:  as per hpi     VITALS:   ICU Vital Signs Last 24 Hrs  T(C): 36.1 (29 Dec 2023 04:00), Max: 36.2 (28 Dec 2023 20:00)  T(F): 97 (29 Dec 2023 04:00), Max: 97.2 (28 Dec 2023 20:00)  HR: 91 (29 Dec 2023 10:45) (51 - 92)  BP: 138/68 (29 Dec 2023 05:00) (113/55 - 140/71)  BP(mean): 93 (29 Dec 2023 05:00) (76 - 94)  ABP: 151/50 (29 Dec 2023 10:45) (55/5 - 293/268)  ABP(mean): 73 (29 Dec 2023 10:45) (43 - 282)  RR: 25 (29 Dec 2023 10:45) (12 - 33)  SpO2: 97% (29 Dec 2023 10:45) (77% - 100%)    O2 Parameters below as of 29 Dec 2023 05:39  Patient On (Oxygen Delivery Method): nasal cannula              PHYSICAL EXAM:  GENERAL: NAD, comfortable.    HEAD:  Atraumatic, Normocephalic  EYES: EOMI, PERRLA, conjunctiva and sclera clear  NECK:  No JVD. +central line   CHEST/LUNG: Clear to auscultation bilaterally; No wheeze +chest tubes  HEART: Regular rate and rhythm; No murmurs, rubs, or gallops  ABDOMEN: Soft, Nontender, Nondistended; Bowel sounds present  EXTREMITIES:  2+ Peripheral Pulses, No clubbing, cyanosis, or edema  NEUROLOGY: a0x3          Consultant(s) Notes Reviewed:  [x ] YES  [ ] NO  Care Discussed with Consultants/Other Providers [ x] YES  [ ] NO    MEDS:   MEDICATIONS  (STANDING):  acetaminophen     Tablet .. 650 milliGRAM(s) Oral every 6 hours  basiliximab  IVPB 20 milliGRAM(s) IV Intermittent once  budesonide  80 MICROgram(s)/formoterol 4.5 MICROgram(s) Inhaler 1 Puff(s) Inhalation two times a day  buMETAnide Injectable 1 milliGRAM(s) IV Push every 12 hours  chlorhexidine 4% Liquid 1 Application(s) Topical <User Schedule>  dextrose 50% Injectable 50 milliLiter(s) IV Push every 15 minutes  heparin   Injectable 5000 Unit(s) SubCutaneous every 8 hours  hydrALAZINE 75 milliGRAM(s) Oral every 8 hours  insulin regular Infusion 3 Unit(s)/Hr (3 mL/Hr) IV Continuous <Continuous>  lidocaine   4% Patch 3 Patch Transdermal daily  methocarbamol 500 milliGRAM(s) Oral every 8 hours  methylnaltrexone Injectable 12 milliGRAM(s) SubCutaneous once  methylPREDNISolone sodium succinate Injectable   IV Push   methylPREDNISolone sodium succinate Injectable 28 milliGRAM(s) IV Push every 12 hours  metoclopramide Injectable 10 milliGRAM(s) IV Push every 8 hours  mupirocin 2% Ointment 1 Application(s) Topical two times a day  mycophenolate mofetil 1000 milliGRAM(s) Oral every 12 hours  naloxegol 25 milliGRAM(s) Oral daily  niCARdipine Infusion 3 mG/Hr (15 mL/Hr) IV Continuous <Continuous>  NIFEdipine XL 60 milliGRAM(s) Oral daily  nystatin    Suspension 626701 Unit(s) Oral <User Schedule>  pantoprazole  Injectable 40 milliGRAM(s) IV Push daily  polyethylene glycol 3350 17 Gram(s) Oral daily  pregabalin 50 milliGRAM(s) Oral every 8 hours  senna 2 Tablet(s) Oral at bedtime  simethicone 160 milliGRAM(s) Chew every 8 hours  sodium chloride 0.9%. 1000 milliLiter(s) (10 mL/Hr) IV Continuous <Continuous>  sorbitol 70%/mineral oil/magnesium hydroxide/glycerin Enema 120 milliLiter(s) Rectal once  tacrolimus 4 milliGRAM(s) Oral <User Schedule>  traMADol 25 milliGRAM(s) Oral every 8 hours    MEDICATIONS  (PRN):        Allergies    penicillins (Unknown)    Intolerances        LABS:                                              8.8    9.49  )-----------( 145      ( 29 Dec 2023 00:30 )             26.7   12-29    136  |  102  |  31<H>  ----------------------------<  155<H>  4.1   |  24  |  0.87    Ca    8.9      29 Dec 2023 00:30  Phos  2.2     12-29  Mg     2.6     12-29    TPro  6.0  /  Alb  3.8  /  TBili  0.4  /  DBili  x   /  AST  20  /  ALT  40  /  AlkPhos  45  12-29      < from: CT Abdomen and Pelvis No Cont (11.28.23 @ 03:38) >  IMPRESSION:  Mildly dilated colon and prominent but not overly dilated small bowel   with air-fluid levels. No discrete transition point. Findings are   suggestive of ileus.    Intra-aortic balloon pump with the inferior marker at the level of the   inferior mesenteric artery and the balloon overlying the origins of the   renal arteries, celiac artery, and superior mesenteric artery. Consider   repositioning. Concern for IABP positioning was discussed with LANETTE Hawthorne   on 11/28/2023 at 3:42 AM by Dr. Shepard.    < end of copied text >          < from: Colonoscopy (11.17.23 @ 10:35) >                                                                                                        Impression:          - The entire examined colon is normal on direct and retroflexion views.                       - No specimens collected.  Recommendation:      - Resume previous diet today.                       - No large polyps or masses detected, No objection from GI standpoint to                 proceed with heart transplantation/advanced heart therapies.                       - Please call back should any further questions or concerns arise.    < end of copied text >     < from: Xray Chest 1 View- PORTABLE-Urgent (11.01.23 @ 07:42) >    IMPRESSION:  Clear lungs.    ---End of Report ---        < end of copied text >  < from: TTE W or WO Ultrasound Enhancing Agent (11.01.23 @ 10:23) >  _____________________________     CONCLUSIONS:      1. Left ventricular cavity is moderately dilated. Left ventricular wall thickness is normal. Left ventricular systolic function is severely decreased with an ejection fraction of 32 % by Chinchilla's method of disks. Regional wall motion abnormalities present.   2. Multiple segmental abnormalities exist. See findings.   3. There is moderate (grade 2) left ventricular diastolic dysfunction, with indeterminant filling pressure.   4. Normal right ventricular cavity size, wall thickness, and systolic function.   5. No significant valvular disease.   6. No pericardial effusion seen.   7. Compared to the transthoracic echocardiogram performed on 1/25/2017 the areas of akinesis are unchanged but there has been a decline in LV systolic function with new areas of hypokinesis.    __________________________________________________________________    < end of copied text >  < from: TTE Limited W or WO Ultrasound Enhancing Agent (11.02.23 @ 07:41) >  __________________________     CONCLUSIONS:      1. After obtaining consent, Definity ultrasound enhancing agent was given for enhanced left ventricular opacification and improved delineation of the left ventricular endocardial borders. Left ventricular systolic function is severely decreased with a calculated ejection fraction of 22 % by the Chinchilla's biplane method of disks. There is a left ventricular thrombus.   2. Findings were discussed with Litzy BOSS on 11/2/2023 at 8.49am.   3. There is a left ventricular thrombus.    _________________________________________________________________    < end of copied text >   LD VALENCIA  58y Male  MRN:[id number]    Patient is a 58y old  Male who presents with a chief complaint of NSTEMI (01 Nov 2023 20:29)    HPI:  59yo M w/ hx HTN, CAD w/ 1 stent in 2009, ICH (2008) presenting with abn ekg. Patient presented to [facility] where he was found to have STEMI, recommended to get cath however patient did not want to get it there so it left and came here.  Patient initially had cough, congestion, fever, was placed on antibiotics on Sunday.  Started feeling nauseous and had a presyncopal event after which he presented to ED last night.  Had chest pain as well.  Chest pain is midsternal.  Not currently having chest pain.  Received 4 aspirin 30 min pta. (01 Nov 2023 15:11)      Patient seen and evaluated at bedside in CTU. interval events noted    Interval HPI: sitting in chair. no acute events o/n     PAST MEDICAL & SURGICAL HISTORY:  HTN (hypertension)      CAD (coronary artery disease)  2009; stent      Intracranial hemorrhage  2008      Respiratory arrest  december 1st      Myocardial infarction, unspecified MI type, unspecified artery      History of coronary artery stent placement          REVIEW OF SYSTEMS:  as per hpi     VITALS:   ICU Vital Signs Last 24 Hrs  T(C): 36.1 (29 Dec 2023 04:00), Max: 36.2 (28 Dec 2023 20:00)  T(F): 97 (29 Dec 2023 04:00), Max: 97.2 (28 Dec 2023 20:00)  HR: 91 (29 Dec 2023 10:45) (51 - 92)  BP: 138/68 (29 Dec 2023 05:00) (113/55 - 140/71)  BP(mean): 93 (29 Dec 2023 05:00) (76 - 94)  ABP: 151/50 (29 Dec 2023 10:45) (55/5 - 293/268)  ABP(mean): 73 (29 Dec 2023 10:45) (43 - 282)  RR: 25 (29 Dec 2023 10:45) (12 - 33)  SpO2: 97% (29 Dec 2023 10:45) (77% - 100%)    O2 Parameters below as of 29 Dec 2023 05:39  Patient On (Oxygen Delivery Method): nasal cannula              PHYSICAL EXAM:  GENERAL: NAD, comfortable.    HEAD:  Atraumatic, Normocephalic  EYES: EOMI, PERRLA, conjunctiva and sclera clear  NECK:  No JVD. +central line   CHEST/LUNG: Clear to auscultation bilaterally; No wheeze +chest tubes  HEART: Regular rate and rhythm; No murmurs, rubs, or gallops  ABDOMEN: Soft, Nontender, Nondistended; Bowel sounds present  EXTREMITIES:  2+ Peripheral Pulses, No clubbing, cyanosis, or edema  NEUROLOGY: a0x3          Consultant(s) Notes Reviewed:  [x ] YES  [ ] NO  Care Discussed with Consultants/Other Providers [ x] YES  [ ] NO    MEDS:   MEDICATIONS  (STANDING):  acetaminophen     Tablet .. 650 milliGRAM(s) Oral every 6 hours  basiliximab  IVPB 20 milliGRAM(s) IV Intermittent once  budesonide  80 MICROgram(s)/formoterol 4.5 MICROgram(s) Inhaler 1 Puff(s) Inhalation two times a day  buMETAnide Injectable 1 milliGRAM(s) IV Push every 12 hours  chlorhexidine 4% Liquid 1 Application(s) Topical <User Schedule>  dextrose 50% Injectable 50 milliLiter(s) IV Push every 15 minutes  heparin   Injectable 5000 Unit(s) SubCutaneous every 8 hours  hydrALAZINE 75 milliGRAM(s) Oral every 8 hours  insulin regular Infusion 3 Unit(s)/Hr (3 mL/Hr) IV Continuous <Continuous>  lidocaine   4% Patch 3 Patch Transdermal daily  methocarbamol 500 milliGRAM(s) Oral every 8 hours  methylnaltrexone Injectable 12 milliGRAM(s) SubCutaneous once  methylPREDNISolone sodium succinate Injectable   IV Push   methylPREDNISolone sodium succinate Injectable 28 milliGRAM(s) IV Push every 12 hours  metoclopramide Injectable 10 milliGRAM(s) IV Push every 8 hours  mupirocin 2% Ointment 1 Application(s) Topical two times a day  mycophenolate mofetil 1000 milliGRAM(s) Oral every 12 hours  naloxegol 25 milliGRAM(s) Oral daily  niCARdipine Infusion 3 mG/Hr (15 mL/Hr) IV Continuous <Continuous>  NIFEdipine XL 60 milliGRAM(s) Oral daily  nystatin    Suspension 214297 Unit(s) Oral <User Schedule>  pantoprazole  Injectable 40 milliGRAM(s) IV Push daily  polyethylene glycol 3350 17 Gram(s) Oral daily  pregabalin 50 milliGRAM(s) Oral every 8 hours  senna 2 Tablet(s) Oral at bedtime  simethicone 160 milliGRAM(s) Chew every 8 hours  sodium chloride 0.9%. 1000 milliLiter(s) (10 mL/Hr) IV Continuous <Continuous>  sorbitol 70%/mineral oil/magnesium hydroxide/glycerin Enema 120 milliLiter(s) Rectal once  tacrolimus 4 milliGRAM(s) Oral <User Schedule>  traMADol 25 milliGRAM(s) Oral every 8 hours    MEDICATIONS  (PRN):        Allergies    penicillins (Unknown)    Intolerances        LABS:                                              8.8    9.49  )-----------( 145      ( 29 Dec 2023 00:30 )             26.7   12-29    136  |  102  |  31<H>  ----------------------------<  155<H>  4.1   |  24  |  0.87    Ca    8.9      29 Dec 2023 00:30  Phos  2.2     12-29  Mg     2.6     12-29    TPro  6.0  /  Alb  3.8  /  TBili  0.4  /  DBili  x   /  AST  20  /  ALT  40  /  AlkPhos  45  12-29      < from: CT Abdomen and Pelvis No Cont (11.28.23 @ 03:38) >  IMPRESSION:  Mildly dilated colon and prominent but not overly dilated small bowel   with air-fluid levels. No discrete transition point. Findings are   suggestive of ileus.    Intra-aortic balloon pump with the inferior marker at the level of the   inferior mesenteric artery and the balloon overlying the origins of the   renal arteries, celiac artery, and superior mesenteric artery. Consider   repositioning. Concern for IABP positioning was discussed with LANETTE Hawthorne   on 11/28/2023 at 3:42 AM by Dr. Shepard.    < end of copied text >          < from: Colonoscopy (11.17.23 @ 10:35) >                                                                                                        Impression:          - The entire examined colon is normal on direct and retroflexion views.                       - No specimens collected.  Recommendation:      - Resume previous diet today.                       - No large polyps or masses detected, No objection from GI standpoint to                 proceed with heart transplantation/advanced heart therapies.                       - Please call back should any further questions or concerns arise.    < end of copied text >     < from: Xray Chest 1 View- PORTABLE-Urgent (11.01.23 @ 07:42) >    IMPRESSION:  Clear lungs.    ---End of Report ---        < end of copied text >  < from: TTE W or WO Ultrasound Enhancing Agent (11.01.23 @ 10:23) >  _____________________________     CONCLUSIONS:      1. Left ventricular cavity is moderately dilated. Left ventricular wall thickness is normal. Left ventricular systolic function is severely decreased with an ejection fraction of 32 % by Chinchilla's method of disks. Regional wall motion abnormalities present.   2. Multiple segmental abnormalities exist. See findings.   3. There is moderate (grade 2) left ventricular diastolic dysfunction, with indeterminant filling pressure.   4. Normal right ventricular cavity size, wall thickness, and systolic function.   5. No significant valvular disease.   6. No pericardial effusion seen.   7. Compared to the transthoracic echocardiogram performed on 1/25/2017 the areas of akinesis are unchanged but there has been a decline in LV systolic function with new areas of hypokinesis.    __________________________________________________________________    < end of copied text >  < from: TTE Limited W or WO Ultrasound Enhancing Agent (11.02.23 @ 07:41) >  __________________________     CONCLUSIONS:      1. After obtaining consent, Definity ultrasound enhancing agent was given for enhanced left ventricular opacification and improved delineation of the left ventricular endocardial borders. Left ventricular systolic function is severely decreased with a calculated ejection fraction of 22 % by the Chinchilla's biplane method of disks. There is a left ventricular thrombus.   2. Findings were discussed with Litzy BOSS on 11/2/2023 at 8.49am.   3. There is a left ventricular thrombus.    _________________________________________________________________    < end of copied text >

## 2023-12-29 NOTE — PROGRESS NOTE ADULT - ASSESSMENT
57yo M w/ PMHx HTN, CAD s/p stent (2009), ICH (2008), ICM now s/p heart transplant on 12/25/2023. Endocrine consulted for T2DM management. BG Goal 140-180mg/dl in critically ill patient. Tolerating POs with good oral intake. On Methylprednisolone taper as below.  Remain on insulin gtt, over night BGs were stable requiring insulin 2-3 units/hr, however BGs in 200s today requiring 4-6 units. Still on high dose of steroid. MTP 28 units BID today.     Steroid schedule  12/28 Methylprednisolone 32 units BID   12/29 Methylprednisolone 28 units BID  12/30 Methylprednisolone 24 units BID   12/31 Methylprednisolone 20 units BID   1/1 Methylprednisolone 16 units BID   1/2-  Methylprednisolone 12 units BID       #T2DM (A1c  8.3%)  Home meds: confirmed with patient on 12/28 patient takes dapagliflozin 10mg daily, patient is NOT taking ozempic any longer (has not taken in over 4 months)    - Insulin requirement remain high 4- 6 units/hr and on high dose of steroids. c/w insulin drip for now with BGs check q 1 hour as per protocol  - will reassess insulin requirements daily   - please notify us if your team desires to transition patient from insulin drip to basal-bolus  - Please  keep hypoglycemia protocol in place     - DISCHARGE RECOMMENDATIONS: depending on steroid regimen on discharge and insulin requirements basal-bolus regimen, TBD.  - OUT-PATIENT FOLLOW UP: Patient should follow up with PCP vs Endocrinology depending on insulin requirement (pt does not have endocrinolgist)    #HLD  - patient not on statin  - goal LDL in diabetes mellitus is <70    #HTN  - patient on losartan 25mg daily at home  - goal BP in diabetes mellitus is <130/80  - defer to primary team for management      discussed with patient and primary team Enrique BOSS   Contact via Microsoft Teams during business hours  To reach covering provider access AMION via [employer]  For Urgent matters/after-hours/weekends/holidays please page endocrine fellow on call   For nonurgent matters please email [email or web address]    Please note that this patient may be followed by different provider tomorrow.  Notify endocrine 24 hours prior to discharge for final recommendations    57yo M w/ PMHx HTN, CAD s/p stent (2009), ICH (2008), ICM now s/p heart transplant on 12/25/2023. Endocrine consulted for T2DM management. BG Goal 140-180mg/dl in critically ill patient. Tolerating POs with good oral intake. On Methylprednisolone taper as below.  Remain on insulin gtt, over night BGs were stable requiring insulin 2-3 units/hr, however BGs in 200s today requiring 4-6 units. Still on high dose of steroid. MTP 28 units BID today.     Steroid schedule  12/28 Methylprednisolone 32 units BID   12/29 Methylprednisolone 28 units BID  12/30 Methylprednisolone 24 units BID   12/31 Methylprednisolone 20 units BID   1/1 Methylprednisolone 16 units BID   1/2-  Methylprednisolone 12 units BID       #T2DM (A1c  8.3%)  Home meds: confirmed with patient on 12/28 patient takes dapagliflozin 10mg daily, patient is NOT taking ozempic any longer (has not taken in over 4 months)    - Insulin requirement remain high 4- 6 units/hr and on high dose of steroids. c/w insulin drip for now with BGs check q 1 hour as per protocol  - will reassess insulin requirements daily   - please notify us if your team desires to transition patient from insulin drip to basal-bolus  - Please  keep hypoglycemia protocol in place     - DISCHARGE RECOMMENDATIONS: depending on steroid regimen on discharge and insulin requirements basal-bolus regimen, TBD.  - OUT-PATIENT FOLLOW UP: Patient should follow up with PCP vs Endocrinology depending on insulin requirement (pt does not have endocrinolgist)    #HLD  - patient not on statin  - goal LDL in diabetes mellitus is <70    #HTN  - patient on losartan 25mg daily at home  - goal BP in diabetes mellitus is <130/80  - defer to primary team for management      discussed with patient and primary team Enrique BOSS   Contact via Microsoft Teams during business hours  To reach covering provider access AMION via [employer]  For Urgent matters/after-hours/weekends/holidays please page endocrine fellow on call   For nonurgent matters please email [email or web address]    Please note that this patient may be followed by different provider tomorrow.  Notify endocrine 24 hours prior to discharge for final recommendations

## 2023-12-29 NOTE — PROGRESS NOTE ADULT - ASSESSMENT
59 yo M with HFrEF (LVIDd 6.4 cm, LVEF 32%), CAD s/p PCI (2008), HTN, DMT2 (A1c 8.3%) and CVA s/p TPA (2018), recently treated for PNA who initially presented to [facility] via EMS after syncope reportedly requiring defibrillation. Treated for ACS but left AMA to come to [facility]. On arrival ECG with ST depression in lateral leads. Intubated 11/1 for respiratory failure and was found to have COVID. L/RHC 11/1revealing  of LAD and RCA, elevated filling pressures and CI of 1.5 prompting placement of IABP. Extubated 11/3. IABP was weaned to off 11/7, however the following day developed PMVT cardiac arrest with prompt CPR and defibrillation, started on IV Lidocaine/Amio and IABP ultimately replaced on 11/9. During this admission was found to be bacteremic for which she was treated for Staph epi, Enterococcus faecalis, Cutibacterium with IV abx.  Was listed Status 2, ABO A, PRA 0% (12/12).     A suitable donor was identified and on 12/25, he underwent OHT (ischemic Time: 253min, CMV -/+, Toxo -/-). The following day, extubated and IABP removed. Milrinone was being gradually weaned but when turned off yesterday, despite adequate perfusion indicies, had rise in lactate for which inotropic support was resumed. Currently appears well supported and compensated. Will again trial wean off inotrope. Will also adjust oral antihypertensive regimen with goal to wean off Cardene gtt. Plan for 1st EMBX next week on 1/2.    59 yo M with HFrEF (LVIDd 6.4 cm, LVEF 32%), CAD s/p PCI (2008), HTN, DMT2 (A1c 8.3%) and CVA s/p TPA (2018), recently treated for PNA who initially presented to [facility] via EMS after syncope reportedly requiring defibrillation. Treated for ACS but left AMA to come to [facility]. On arrival ECG with ST depression in lateral leads. Intubated 11/1 for respiratory failure and was found to have COVID. L/RHC 11/1revealing  of LAD and RCA, elevated filling pressures and CI of 1.5 prompting placement of IABP. Extubated 11/3. IABP was weaned to off 11/7, however the following day developed PMVT cardiac arrest with prompt CPR and defibrillation, started on IV Lidocaine/Amio and IABP ultimately replaced on 11/9. During this admission was found to be bacteremic for which she was treated for Staph epi, Enterococcus faecalis, Cutibacterium with IV abx.  Was listed Status 2, ABO A, PRA 0% (12/12).     A suitable donor was identified and on 12/25, he underwent OHT (ischemic Time: 253min, CMV -/+, Toxo -/-). The following day, extubated and IABP removed. Milrinone was being gradually weaned but when turned off yesterday, despite adequate perfusion indicies, had rise in lactate for which inotropic support was resumed. Currently appears well supported and compensated. Will again trial wean off inotrope. Will also adjust oral antihypertensive regimen with goal to wean off Cardene gtt. Plan for 1st EMBX next week on 1/2.

## 2023-12-29 NOTE — PROGRESS NOTE ADULT - CRITICAL CARE ATTENDING COMMENT
ABO: A, listed status 2 PAR 0%  s/p OHT 12/25, DBD donor, IST: 253mins,   IABP removed 12/26, Sabrina weaned off and epi weaned off   wean milrinone to off and follow MVo2  wean cardine and start Nifedipine for BP control  diuretics to keep Net neg 1lit  TTE with normal BiV function    IS: s/p simulect 12/25 , next dose12/29  increase tacro 4mg po BID, daily monrning level  cont cellcept and solumedrol stacie    OI: CMV -/+ and toxo -/-  will start ppx when tolerating PO  Will cont nystatin   cont umm op abx    Blood Sugar management    tolerating PO, bowel regimen (no BM since 12/23)  IS, OOB to chair and ambulate  DVT/GI PPX  advance po diet as tolerated  GI PPX  feed as tolerated

## 2023-12-29 NOTE — PROGRESS NOTE ADULT - ASSESSMENT
57 yo male h/o htn, cad s/p pci, ICH, here with NSTEMI  s/p intubation and cath. now in CCU    NSTEMI  s/p  cath  cath results noted. multi vessel dz., CTSx f/u.    pt with vtach/fib arrest again on 11/8. s/p ACLS and ROSC.   in ccu with iabp   plan for transplant as per HF and transplant team  transplant w/u ongoing.   s/p colonoscopy 11/17. normal  now listed for transplant as of 11/22 12/25: pt s/p heart transplant last night. remains intubated with iabp in CTU.   12/26: pt extubated to high flow this am. IABP currently being removed. pt a0x3.   12/27: pt feels well. walked with PT this am. currently comforable sitting in chair. IABP removed. wean off pressors as able.   12/28-29: no acute events o/n. weaning down ionotropes/pressors as able. immunosuppressives as per transplant team. PT    mngt as per CTU team          Advanced care planning was discussed with patient and family.  Advanced care planning forms were reviewed and discussed as appropriate.  Differential diagnosis and plan of care discussed with patient after the evaluation.   Pain assessed and judicious use of narcotics when appropriate was discussed.  Importance of Fall prevention discussed.  Counseling on Smoking and Alcohol cessation was offered when appropriate.  Counseling on Diet, exercise, and medication compliance was done.       Approx 75 minutes spent. 59 yo male h/o htn, cad s/p pci, ICH, here with NSTEMI  s/p intubation and cath. now in CCU    NSTEMI  s/p  cath  cath results noted. multi vessel dz., CTSx f/u.    pt with vtach/fib arrest again on 11/8. s/p ACLS and ROSC.   in ccu with iabp   plan for transplant as per HF and transplant team  transplant w/u ongoing.   s/p colonoscopy 11/17. normal  now listed for transplant as of 11/22 12/25: pt s/p heart transplant last night. remains intubated with iabp in CTU.   12/26: pt extubated to high flow this am. IABP currently being removed. pt a0x3.   12/27: pt feels well. walked with PT this am. currently comforable sitting in chair. IABP removed. wean off pressors as able.   12/28-29: no acute events o/n. weaning down ionotropes/pressors as able. immunosuppressives as per transplant team. PT    mngt as per CTU team          Advanced care planning was discussed with patient and family.  Advanced care planning forms were reviewed and discussed as appropriate.  Differential diagnosis and plan of care discussed with patient after the evaluation.   Pain assessed and judicious use of narcotics when appropriate was discussed.  Importance of Fall prevention discussed.  Counseling on Smoking and Alcohol cessation was offered when appropriate.  Counseling on Diet, exercise, and medication compliance was done.       Approx 75 minutes spent.

## 2023-12-29 NOTE — PROGRESS NOTE ADULT - PROBLEM SELECTOR PLAN 2
- s/p Simulect on POD 0, plan to receive second dose on POD 4 (12/29).  - Continue with Steroid taper per protocol   - Continue with Cellcept 1g IV BID  - Will continue to adjust tacro as needed for goal will be 12-14

## 2023-12-30 LAB
ALBUMIN SERPL ELPH-MCNC: 3.9 G/DL — SIGNIFICANT CHANGE UP (ref 3.3–5)
ALBUMIN SERPL ELPH-MCNC: 3.9 G/DL — SIGNIFICANT CHANGE UP (ref 3.3–5)
ALP SERPL-CCNC: 45 U/L — SIGNIFICANT CHANGE UP (ref 40–120)
ALP SERPL-CCNC: 45 U/L — SIGNIFICANT CHANGE UP (ref 40–120)
ALT FLD-CCNC: 42 U/L — SIGNIFICANT CHANGE UP (ref 10–45)
ALT FLD-CCNC: 42 U/L — SIGNIFICANT CHANGE UP (ref 10–45)
ANION GAP SERPL CALC-SCNC: 11 MMOL/L — SIGNIFICANT CHANGE UP (ref 5–17)
ANION GAP SERPL CALC-SCNC: 11 MMOL/L — SIGNIFICANT CHANGE UP (ref 5–17)
AST SERPL-CCNC: 22 U/L — SIGNIFICANT CHANGE UP (ref 10–40)
AST SERPL-CCNC: 22 U/L — SIGNIFICANT CHANGE UP (ref 10–40)
BASOPHILS # BLD AUTO: 0.01 K/UL — SIGNIFICANT CHANGE UP (ref 0–0.2)
BASOPHILS # BLD AUTO: 0.01 K/UL — SIGNIFICANT CHANGE UP (ref 0–0.2)
BASOPHILS NFR BLD AUTO: 0.1 % — SIGNIFICANT CHANGE UP (ref 0–2)
BASOPHILS NFR BLD AUTO: 0.1 % — SIGNIFICANT CHANGE UP (ref 0–2)
BILIRUB SERPL-MCNC: 0.3 MG/DL — SIGNIFICANT CHANGE UP (ref 0.2–1.2)
BILIRUB SERPL-MCNC: 0.3 MG/DL — SIGNIFICANT CHANGE UP (ref 0.2–1.2)
BUN SERPL-MCNC: 32 MG/DL — HIGH (ref 7–23)
BUN SERPL-MCNC: 32 MG/DL — HIGH (ref 7–23)
CALCIUM SERPL-MCNC: 8.8 MG/DL — SIGNIFICANT CHANGE UP (ref 8.4–10.5)
CALCIUM SERPL-MCNC: 8.8 MG/DL — SIGNIFICANT CHANGE UP (ref 8.4–10.5)
CHLORIDE SERPL-SCNC: 97 MMOL/L — SIGNIFICANT CHANGE UP (ref 96–108)
CHLORIDE SERPL-SCNC: 97 MMOL/L — SIGNIFICANT CHANGE UP (ref 96–108)
CO2 SERPL-SCNC: 27 MMOL/L — SIGNIFICANT CHANGE UP (ref 22–31)
CO2 SERPL-SCNC: 27 MMOL/L — SIGNIFICANT CHANGE UP (ref 22–31)
CREAT SERPL-MCNC: 0.78 MG/DL — SIGNIFICANT CHANGE UP (ref 0.5–1.3)
CREAT SERPL-MCNC: 0.78 MG/DL — SIGNIFICANT CHANGE UP (ref 0.5–1.3)
CULTURE RESULTS: NO GROWTH — SIGNIFICANT CHANGE UP
CULTURE RESULTS: NO GROWTH — SIGNIFICANT CHANGE UP
EGFR: 103 ML/MIN/1.73M2 — SIGNIFICANT CHANGE UP
EGFR: 103 ML/MIN/1.73M2 — SIGNIFICANT CHANGE UP
EOSINOPHIL # BLD AUTO: 0 K/UL — SIGNIFICANT CHANGE UP (ref 0–0.5)
EOSINOPHIL # BLD AUTO: 0 K/UL — SIGNIFICANT CHANGE UP (ref 0–0.5)
EOSINOPHIL NFR BLD AUTO: 0 % — SIGNIFICANT CHANGE UP (ref 0–6)
EOSINOPHIL NFR BLD AUTO: 0 % — SIGNIFICANT CHANGE UP (ref 0–6)
GAS PNL BLDA: SIGNIFICANT CHANGE UP
GAS PNL BLDA: SIGNIFICANT CHANGE UP
GLUCOSE BLDC GLUCOMTR-MCNC: 106 MG/DL — HIGH (ref 70–99)
GLUCOSE BLDC GLUCOMTR-MCNC: 106 MG/DL — HIGH (ref 70–99)
GLUCOSE BLDC GLUCOMTR-MCNC: 108 MG/DL — HIGH (ref 70–99)
GLUCOSE BLDC GLUCOMTR-MCNC: 108 MG/DL — HIGH (ref 70–99)
GLUCOSE BLDC GLUCOMTR-MCNC: 119 MG/DL — HIGH (ref 70–99)
GLUCOSE BLDC GLUCOMTR-MCNC: 123 MG/DL — HIGH (ref 70–99)
GLUCOSE BLDC GLUCOMTR-MCNC: 123 MG/DL — HIGH (ref 70–99)
GLUCOSE BLDC GLUCOMTR-MCNC: 125 MG/DL — HIGH (ref 70–99)
GLUCOSE BLDC GLUCOMTR-MCNC: 125 MG/DL — HIGH (ref 70–99)
GLUCOSE BLDC GLUCOMTR-MCNC: 126 MG/DL — HIGH (ref 70–99)
GLUCOSE BLDC GLUCOMTR-MCNC: 126 MG/DL — HIGH (ref 70–99)
GLUCOSE BLDC GLUCOMTR-MCNC: 133 MG/DL — HIGH (ref 70–99)
GLUCOSE BLDC GLUCOMTR-MCNC: 133 MG/DL — HIGH (ref 70–99)
GLUCOSE BLDC GLUCOMTR-MCNC: 143 MG/DL — HIGH (ref 70–99)
GLUCOSE BLDC GLUCOMTR-MCNC: 143 MG/DL — HIGH (ref 70–99)
GLUCOSE BLDC GLUCOMTR-MCNC: 150 MG/DL — HIGH (ref 70–99)
GLUCOSE BLDC GLUCOMTR-MCNC: 150 MG/DL — HIGH (ref 70–99)
GLUCOSE BLDC GLUCOMTR-MCNC: 161 MG/DL — HIGH (ref 70–99)
GLUCOSE BLDC GLUCOMTR-MCNC: 161 MG/DL — HIGH (ref 70–99)
GLUCOSE BLDC GLUCOMTR-MCNC: 165 MG/DL — HIGH (ref 70–99)
GLUCOSE BLDC GLUCOMTR-MCNC: 179 MG/DL — HIGH (ref 70–99)
GLUCOSE BLDC GLUCOMTR-MCNC: 179 MG/DL — HIGH (ref 70–99)
GLUCOSE BLDC GLUCOMTR-MCNC: 181 MG/DL — HIGH (ref 70–99)
GLUCOSE BLDC GLUCOMTR-MCNC: 181 MG/DL — HIGH (ref 70–99)
GLUCOSE BLDC GLUCOMTR-MCNC: 197 MG/DL — HIGH (ref 70–99)
GLUCOSE BLDC GLUCOMTR-MCNC: 197 MG/DL — HIGH (ref 70–99)
GLUCOSE BLDC GLUCOMTR-MCNC: 210 MG/DL — HIGH (ref 70–99)
GLUCOSE BLDC GLUCOMTR-MCNC: 210 MG/DL — HIGH (ref 70–99)
GLUCOSE BLDC GLUCOMTR-MCNC: 214 MG/DL — HIGH (ref 70–99)
GLUCOSE BLDC GLUCOMTR-MCNC: 214 MG/DL — HIGH (ref 70–99)
GLUCOSE BLDC GLUCOMTR-MCNC: 228 MG/DL — HIGH (ref 70–99)
GLUCOSE BLDC GLUCOMTR-MCNC: 228 MG/DL — HIGH (ref 70–99)
GLUCOSE BLDC GLUCOMTR-MCNC: 232 MG/DL — HIGH (ref 70–99)
GLUCOSE BLDC GLUCOMTR-MCNC: 232 MG/DL — HIGH (ref 70–99)
GLUCOSE BLDC GLUCOMTR-MCNC: 237 MG/DL — HIGH (ref 70–99)
GLUCOSE BLDC GLUCOMTR-MCNC: 237 MG/DL — HIGH (ref 70–99)
GLUCOSE BLDC GLUCOMTR-MCNC: 238 MG/DL — HIGH (ref 70–99)
GLUCOSE BLDC GLUCOMTR-MCNC: 238 MG/DL — HIGH (ref 70–99)
GLUCOSE BLDC GLUCOMTR-MCNC: 244 MG/DL — HIGH (ref 70–99)
GLUCOSE BLDC GLUCOMTR-MCNC: 244 MG/DL — HIGH (ref 70–99)
GLUCOSE BLDC GLUCOMTR-MCNC: 92 MG/DL — SIGNIFICANT CHANGE UP (ref 70–99)
GLUCOSE BLDC GLUCOMTR-MCNC: 92 MG/DL — SIGNIFICANT CHANGE UP (ref 70–99)
GLUCOSE SERPL-MCNC: 174 MG/DL — HIGH (ref 70–99)
GLUCOSE SERPL-MCNC: 174 MG/DL — HIGH (ref 70–99)
HCT VFR BLD CALC: 27.1 % — LOW (ref 39–50)
HCT VFR BLD CALC: 27.1 % — LOW (ref 39–50)
HGB BLD-MCNC: 9 G/DL — LOW (ref 13–17)
HGB BLD-MCNC: 9 G/DL — LOW (ref 13–17)
IMM GRANULOCYTES NFR BLD AUTO: 0.6 % — SIGNIFICANT CHANGE UP (ref 0–0.9)
IMM GRANULOCYTES NFR BLD AUTO: 0.6 % — SIGNIFICANT CHANGE UP (ref 0–0.9)
LYMPHOCYTES # BLD AUTO: 0.89 K/UL — LOW (ref 1–3.3)
LYMPHOCYTES # BLD AUTO: 0.89 K/UL — LOW (ref 1–3.3)
LYMPHOCYTES # BLD AUTO: 10.5 % — LOW (ref 13–44)
LYMPHOCYTES # BLD AUTO: 10.5 % — LOW (ref 13–44)
MAGNESIUM SERPL-MCNC: 2.1 MG/DL — SIGNIFICANT CHANGE UP (ref 1.6–2.6)
MAGNESIUM SERPL-MCNC: 2.1 MG/DL — SIGNIFICANT CHANGE UP (ref 1.6–2.6)
MCHC RBC-ENTMCNC: 29.5 PG — SIGNIFICANT CHANGE UP (ref 27–34)
MCHC RBC-ENTMCNC: 29.5 PG — SIGNIFICANT CHANGE UP (ref 27–34)
MCHC RBC-ENTMCNC: 33.2 GM/DL — SIGNIFICANT CHANGE UP (ref 32–36)
MCHC RBC-ENTMCNC: 33.2 GM/DL — SIGNIFICANT CHANGE UP (ref 32–36)
MCV RBC AUTO: 88.9 FL — SIGNIFICANT CHANGE UP (ref 80–100)
MCV RBC AUTO: 88.9 FL — SIGNIFICANT CHANGE UP (ref 80–100)
MONOCYTES # BLD AUTO: 0.5 K/UL — SIGNIFICANT CHANGE UP (ref 0–0.9)
MONOCYTES # BLD AUTO: 0.5 K/UL — SIGNIFICANT CHANGE UP (ref 0–0.9)
MONOCYTES NFR BLD AUTO: 5.9 % — SIGNIFICANT CHANGE UP (ref 2–14)
MONOCYTES NFR BLD AUTO: 5.9 % — SIGNIFICANT CHANGE UP (ref 2–14)
NEUTROPHILS # BLD AUTO: 7.04 K/UL — SIGNIFICANT CHANGE UP (ref 1.8–7.4)
NEUTROPHILS # BLD AUTO: 7.04 K/UL — SIGNIFICANT CHANGE UP (ref 1.8–7.4)
NEUTROPHILS NFR BLD AUTO: 82.9 % — HIGH (ref 43–77)
NEUTROPHILS NFR BLD AUTO: 82.9 % — HIGH (ref 43–77)
NRBC # BLD: 0 /100 WBCS — SIGNIFICANT CHANGE UP (ref 0–0)
NRBC # BLD: 0 /100 WBCS — SIGNIFICANT CHANGE UP (ref 0–0)
PHOSPHATE SERPL-MCNC: 1.9 MG/DL — LOW (ref 2.5–4.5)
PHOSPHATE SERPL-MCNC: 1.9 MG/DL — LOW (ref 2.5–4.5)
PLATELET # BLD AUTO: 175 K/UL — SIGNIFICANT CHANGE UP (ref 150–400)
PLATELET # BLD AUTO: 175 K/UL — SIGNIFICANT CHANGE UP (ref 150–400)
POTASSIUM SERPL-MCNC: 3.9 MMOL/L — SIGNIFICANT CHANGE UP (ref 3.5–5.3)
POTASSIUM SERPL-MCNC: 3.9 MMOL/L — SIGNIFICANT CHANGE UP (ref 3.5–5.3)
POTASSIUM SERPL-SCNC: 3.9 MMOL/L — SIGNIFICANT CHANGE UP (ref 3.5–5.3)
POTASSIUM SERPL-SCNC: 3.9 MMOL/L — SIGNIFICANT CHANGE UP (ref 3.5–5.3)
PROT SERPL-MCNC: 6.1 G/DL — SIGNIFICANT CHANGE UP (ref 6–8.3)
PROT SERPL-MCNC: 6.1 G/DL — SIGNIFICANT CHANGE UP (ref 6–8.3)
RBC # BLD: 3.05 M/UL — LOW (ref 4.2–5.8)
RBC # BLD: 3.05 M/UL — LOW (ref 4.2–5.8)
RBC # FLD: 15.1 % — HIGH (ref 10.3–14.5)
RBC # FLD: 15.1 % — HIGH (ref 10.3–14.5)
SODIUM SERPL-SCNC: 135 MMOL/L — SIGNIFICANT CHANGE UP (ref 135–145)
SODIUM SERPL-SCNC: 135 MMOL/L — SIGNIFICANT CHANGE UP (ref 135–145)
SPECIMEN SOURCE: SIGNIFICANT CHANGE UP
SPECIMEN SOURCE: SIGNIFICANT CHANGE UP
TACROLIMUS SERPL-MCNC: 8.1 NG/ML — SIGNIFICANT CHANGE UP
TACROLIMUS SERPL-MCNC: 8.1 NG/ML — SIGNIFICANT CHANGE UP
WBC # BLD: 8.49 K/UL — SIGNIFICANT CHANGE UP (ref 3.8–10.5)
WBC # BLD: 8.49 K/UL — SIGNIFICANT CHANGE UP (ref 3.8–10.5)
WBC # FLD AUTO: 8.49 K/UL — SIGNIFICANT CHANGE UP (ref 3.8–10.5)
WBC # FLD AUTO: 8.49 K/UL — SIGNIFICANT CHANGE UP (ref 3.8–10.5)

## 2023-12-30 PROCEDURE — 99291 CRITICAL CARE FIRST HOUR: CPT

## 2023-12-30 PROCEDURE — 71045 X-RAY EXAM CHEST 1 VIEW: CPT | Mod: 26

## 2023-12-30 RX ORDER — MAGNESIUM SULFATE 500 MG/ML
2 VIAL (ML) INJECTION ONCE
Refills: 0 | Status: COMPLETED | OUTPATIENT
Start: 2023-12-30 | End: 2023-12-30

## 2023-12-30 RX ORDER — HYDRALAZINE HCL 50 MG
100 TABLET ORAL EVERY 8 HOURS
Refills: 0 | Status: DISCONTINUED | OUTPATIENT
Start: 2023-12-30 | End: 2024-01-08

## 2023-12-30 RX ORDER — FUROSEMIDE 40 MG
20 TABLET ORAL ONCE
Refills: 0 | Status: COMPLETED | OUTPATIENT
Start: 2023-12-30 | End: 2023-12-31

## 2023-12-30 RX ORDER — POTASSIUM CHLORIDE 20 MEQ
10 PACKET (EA) ORAL ONCE
Refills: 0 | Status: COMPLETED | OUTPATIENT
Start: 2023-12-30 | End: 2023-12-30

## 2023-12-30 RX ORDER — HYDRALAZINE HCL 50 MG
10 TABLET ORAL ONCE
Refills: 0 | Status: COMPLETED | OUTPATIENT
Start: 2023-12-30 | End: 2023-12-30

## 2023-12-30 RX ORDER — TACROLIMUS 5 MG/1
4 CAPSULE ORAL
Refills: 0 | Status: DISCONTINUED | OUTPATIENT
Start: 2023-12-30 | End: 2023-12-31

## 2023-12-30 RX ORDER — HYDRALAZINE HCL 50 MG
25 TABLET ORAL ONCE
Refills: 0 | Status: COMPLETED | OUTPATIENT
Start: 2023-12-30 | End: 2023-12-30

## 2023-12-30 RX ORDER — NIFEDIPINE 30 MG
60 TABLET, EXTENDED RELEASE 24 HR ORAL EVERY 12 HOURS
Refills: 0 | Status: DISCONTINUED | OUTPATIENT
Start: 2023-12-30 | End: 2024-01-09

## 2023-12-30 RX ORDER — MULTIVIT WITH MIN/MFOLATE/K2 340-15/3 G
296 POWDER (GRAM) ORAL ONCE
Refills: 0 | Status: COMPLETED | OUTPATIENT
Start: 2023-12-30 | End: 2023-12-30

## 2023-12-30 RX ADMIN — HEPARIN SODIUM 5000 UNIT(S): 5000 INJECTION INTRAVENOUS; SUBCUTANEOUS at 21:01

## 2023-12-30 RX ADMIN — SIMETHICONE 160 MILLIGRAM(S): 80 TABLET, CHEWABLE ORAL at 13:34

## 2023-12-30 RX ADMIN — Medication 650 MILLIGRAM(S): at 14:05

## 2023-12-30 RX ADMIN — Medication 85 MILLIMOLE(S): at 04:49

## 2023-12-30 RX ADMIN — TACROLIMUS 3 MILLIGRAM(S): 5 CAPSULE ORAL at 07:37

## 2023-12-30 RX ADMIN — INSULIN HUMAN 3 UNIT(S)/HR: 100 INJECTION, SOLUTION SUBCUTANEOUS at 19:34

## 2023-12-30 RX ADMIN — TRAMADOL HYDROCHLORIDE 25 MILLIGRAM(S): 50 TABLET ORAL at 14:03

## 2023-12-30 RX ADMIN — Medication 500000 UNIT(S): at 17:00

## 2023-12-30 RX ADMIN — Medication 50 MILLIGRAM(S): at 05:12

## 2023-12-30 RX ADMIN — BUMETANIDE 1 MILLIGRAM(S): 0.25 INJECTION INTRAMUSCULAR; INTRAVENOUS at 05:13

## 2023-12-30 RX ADMIN — MUPIROCIN 1 APPLICATION(S): 20 OINTMENT TOPICAL at 05:14

## 2023-12-30 RX ADMIN — SODIUM CHLORIDE 10 MILLILITER(S): 9 INJECTION INTRAMUSCULAR; INTRAVENOUS; SUBCUTANEOUS at 07:39

## 2023-12-30 RX ADMIN — Medication 650 MILLIGRAM(S): at 17:01

## 2023-12-30 RX ADMIN — Medication 60 MILLIGRAM(S): at 05:13

## 2023-12-30 RX ADMIN — SENNA PLUS 2 TABLET(S): 8.6 TABLET ORAL at 21:01

## 2023-12-30 RX ADMIN — TRAMADOL HYDROCHLORIDE 25 MILLIGRAM(S): 50 TABLET ORAL at 05:30

## 2023-12-30 RX ADMIN — PANTOPRAZOLE SODIUM 40 MILLIGRAM(S): 20 TABLET, DELAYED RELEASE ORAL at 13:32

## 2023-12-30 RX ADMIN — Medication 28 MILLIGRAM(S): at 05:11

## 2023-12-30 RX ADMIN — INSULIN HUMAN 3 UNIT(S)/HR: 100 INJECTION, SOLUTION SUBCUTANEOUS at 07:39

## 2023-12-30 RX ADMIN — Medication 75 MILLIGRAM(S): at 05:11

## 2023-12-30 RX ADMIN — SIMETHICONE 160 MILLIGRAM(S): 80 TABLET, CHEWABLE ORAL at 21:01

## 2023-12-30 RX ADMIN — NICARDIPINE HYDROCHLORIDE 15 MG/HR: 30 CAPSULE, EXTENDED RELEASE ORAL at 19:34

## 2023-12-30 RX ADMIN — Medication 50 MILLIGRAM(S): at 21:01

## 2023-12-30 RX ADMIN — MYCOPHENOLATE MOFETIL 1000 MILLIGRAM(S): 250 CAPSULE ORAL at 19:37

## 2023-12-30 RX ADMIN — SIMETHICONE 160 MILLIGRAM(S): 80 TABLET, CHEWABLE ORAL at 05:12

## 2023-12-30 RX ADMIN — METHOCARBAMOL 500 MILLIGRAM(S): 500 TABLET, FILM COATED ORAL at 21:00

## 2023-12-30 RX ADMIN — TRAMADOL HYDROCHLORIDE 25 MILLIGRAM(S): 50 TABLET ORAL at 13:33

## 2023-12-30 RX ADMIN — Medication 10 MILLIGRAM(S): at 18:11

## 2023-12-30 RX ADMIN — HEPARIN SODIUM 5000 UNIT(S): 5000 INJECTION INTRAVENOUS; SUBCUTANEOUS at 13:34

## 2023-12-30 RX ADMIN — BUDESONIDE AND FORMOTEROL FUMARATE DIHYDRATE 1 PUFF(S): 160; 4.5 AEROSOL RESPIRATORY (INHALATION) at 17:54

## 2023-12-30 RX ADMIN — Medication 650 MILLIGRAM(S): at 13:35

## 2023-12-30 RX ADMIN — MYCOPHENOLATE MOFETIL 1000 MILLIGRAM(S): 250 CAPSULE ORAL at 07:37

## 2023-12-30 RX ADMIN — Medication 25 MILLIGRAM(S): at 16:55

## 2023-12-30 RX ADMIN — METHOCARBAMOL 500 MILLIGRAM(S): 500 TABLET, FILM COATED ORAL at 05:14

## 2023-12-30 RX ADMIN — Medication 650 MILLIGRAM(S): at 05:10

## 2023-12-30 RX ADMIN — Medication 10 MILLIGRAM(S): at 21:01

## 2023-12-30 RX ADMIN — Medication 60 MILLIGRAM(S): at 17:01

## 2023-12-30 RX ADMIN — Medication 50 MILLIEQUIVALENT(S): at 01:25

## 2023-12-30 RX ADMIN — TRAMADOL HYDROCHLORIDE 25 MILLIGRAM(S): 50 TABLET ORAL at 05:12

## 2023-12-30 RX ADMIN — Medication 650 MILLIGRAM(S): at 05:30

## 2023-12-30 RX ADMIN — Medication 10 MILLIGRAM(S): at 05:13

## 2023-12-30 RX ADMIN — Medication 25 GRAM(S): at 01:25

## 2023-12-30 RX ADMIN — POLYETHYLENE GLYCOL 3350 17 GRAM(S): 17 POWDER, FOR SOLUTION ORAL at 13:31

## 2023-12-30 RX ADMIN — BUMETANIDE 1 MILLIGRAM(S): 0.25 INJECTION INTRAMUSCULAR; INTRAVENOUS at 17:01

## 2023-12-30 RX ADMIN — Medication 100 MILLIGRAM(S): at 21:02

## 2023-12-30 RX ADMIN — TACROLIMUS 4 MILLIGRAM(S): 5 CAPSULE ORAL at 19:37

## 2023-12-30 RX ADMIN — NALOXEGOL OXALATE 25 MILLIGRAM(S): 12.5 TABLET, FILM COATED ORAL at 13:33

## 2023-12-30 RX ADMIN — HEPARIN SODIUM 5000 UNIT(S): 5000 INJECTION INTRAVENOUS; SUBCUTANEOUS at 05:13

## 2023-12-30 RX ADMIN — Medication 500000 UNIT(S): at 07:37

## 2023-12-30 RX ADMIN — Medication 75 MILLIGRAM(S): at 13:32

## 2023-12-30 RX ADMIN — TRAMADOL HYDROCHLORIDE 25 MILLIGRAM(S): 50 TABLET ORAL at 21:02

## 2023-12-30 RX ADMIN — Medication 50 MILLIGRAM(S): at 13:33

## 2023-12-30 RX ADMIN — Medication 500000 UNIT(S): at 19:43

## 2023-12-30 RX ADMIN — Medication 10 MILLIGRAM(S): at 16:55

## 2023-12-30 RX ADMIN — Medication 650 MILLIGRAM(S): at 17:31

## 2023-12-30 RX ADMIN — Medication 500000 UNIT(S): at 13:31

## 2023-12-30 RX ADMIN — SODIUM CHLORIDE 10 MILLILITER(S): 9 INJECTION INTRAMUSCULAR; INTRAVENOUS; SUBCUTANEOUS at 19:34

## 2023-12-30 RX ADMIN — Medication 24 MILLIGRAM(S): at 18:11

## 2023-12-30 RX ADMIN — Medication 10 MILLIGRAM(S): at 13:33

## 2023-12-30 RX ADMIN — TRAMADOL HYDROCHLORIDE 25 MILLIGRAM(S): 50 TABLET ORAL at 21:32

## 2023-12-30 RX ADMIN — NICARDIPINE HYDROCHLORIDE 15 MG/HR: 30 CAPSULE, EXTENDED RELEASE ORAL at 07:40

## 2023-12-30 RX ADMIN — METHOCARBAMOL 500 MILLIGRAM(S): 500 TABLET, FILM COATED ORAL at 13:32

## 2023-12-30 NOTE — PROGRESS NOTE ADULT - ASSESSMENT
57 yo M with HFrEF (LVIDd 6.4 cm, LVEF 32%), CAD s/p PCI (2008), HTN, DMT2 (A1c 8.3%) and CVA s/p TPA (2018), recently treated for PNA who initially presented to [facility] via EMS after syncope reportedly requiring defibrillation. Treated for ACS but left AMA to come to [facility]. On arrival ECG with ST depression in lateral leads. Intubated 11/1 for respiratory failure and was found to have COVID. L/RHC 11/1revealing  of LAD and RCA, elevated filling pressures and CI of 1.5 prompting placement of IABP. Extubated 11/3. IABP was weaned to off 11/7, however the following day developed PMVT cardiac arrest with prompt CPR and defibrillation, started on IV Lidocaine/Amio and IABP ultimately replaced on 11/9. During this admission was found to be bacteremic for which she was treated for Staph epi, Enterococcus faecalis, Cutibacterium with IV abx.  Was listed Status 2, ABO A, PRA 0% (12/12).     A suitable donor was identified and on 12/25, he underwent OHT (ischemic Time: 253min, CMV -/+, Toxo -/-). The following day, extubated and IABP removed. Milrinone was being gradually weaned but when turned off yesterday, despite adequate perfusion indicies, had rise in lactate for which inotropic support was resumed. Currently appears well supported and compensated. Will again trial wean off inotrope. Will also adjust oral antihypertensive regimen with goal to wean off Cardene gtt. Plan for 1st EMBX next week on 1/2.    59 yo M with HFrEF (LVIDd 6.4 cm, LVEF 32%), CAD s/p PCI (2008), HTN, DMT2 (A1c 8.3%) and CVA s/p TPA (2018), recently treated for PNA who initially presented to [facility] via EMS after syncope reportedly requiring defibrillation. Treated for ACS but left AMA to come to [facility]. On arrival ECG with ST depression in lateral leads. Intubated 11/1 for respiratory failure and was found to have COVID. L/RHC 11/1revealing  of LAD and RCA, elevated filling pressures and CI of 1.5 prompting placement of IABP. Extubated 11/3. IABP was weaned to off 11/7, however the following day developed PMVT cardiac arrest with prompt CPR and defibrillation, started on IV Lidocaine/Amio and IABP ultimately replaced on 11/9. During this admission was found to be bacteremic for which she was treated for Staph epi, Enterococcus faecalis, Cutibacterium with IV abx.  Was listed Status 2, ABO A, PRA 0% (12/12).     A suitable donor was identified and on 12/25, he underwent OHT (ischemic Time: 253min, CMV -/+, Toxo -/-). The following day, extubated and IABP removed. Milrinone was being gradually weaned but when turned off yesterday, despite adequate perfusion indicies, had rise in lactate for which inotropic support was resumed. Currently appears well supported and compensated. Will again trial wean off inotrope. Will also adjust oral antihypertensive regimen with goal to wean off Cardene gtt. Plan for 1st EMBX next week on 1/2.

## 2023-12-30 NOTE — PROGRESS NOTE ADULT - ASSESSMENT
59 yo male h/o htn, cad s/p pci, ICH, here with NSTEMI  s/p intubation and cath. now in CCU    NSTEMI  s/p  cath  cath results noted. multi vessel dz., CTSx f/u.    pt with vtach/fib arrest again on 11/8. s/p ACLS and ROSC.   in ccu with iabp   plan for transplant as per HF and transplant team  transplant w/u ongoing.   s/p colonoscopy 11/17. normal  now listed for transplant as of 11/22 12/25: pt s/p heart transplant last night. remains intubated with iabp in CTU.   12/26: pt extubated to high flow this am. IABP currently being removed. pt a0x3.   12/27: pt feels well. walked with PT this am. currently comforable sitting in chair. IABP removed. wean off pressors as able.   12/28-29: no acute events o/n. weaning down ionotropes/pressors as able. immunosuppressives as per transplant team. PT  12/30: feels well. pressors/ionotropes weaned off. removal of chest tubes as able. immunosupressives as per id. PT      mngt as per CTU team          Advanced care planning was discussed with patient and family.  Advanced care planning forms were reviewed and discussed as appropriate.  Differential diagnosis and plan of care discussed with patient after the evaluation.   Pain assessed and judicious use of narcotics when appropriate was discussed.  Importance of Fall prevention discussed.  Counseling on Smoking and Alcohol cessation was offered when appropriate.  Counseling on Diet, exercise, and medication compliance was done.       Approx 75 minutes spent.

## 2023-12-30 NOTE — PROGRESS NOTE ADULT - PROBLEM SELECTOR PLAN 3
- CMV -/+: intermediate risk.  Plan to start Valcyte in upcoming days  - Toxo -/-: none required  - PJP ppx: plan to start bactrim or mepron in upcoming days, will be based off renal function   - Trush ppx: continue Nystatin S/S q6 hours   - donor HCV TIM - but HCV antibody +.  - on daptomycin ppx for hx of rash to vancomycin and donor sputum + for staph.

## 2023-12-30 NOTE — PROGRESS NOTE ADULT - SUBJECTIVE AND OBJECTIVE BOX
Patient seen and examined at the bedside.    Remained critically ill on continuous ICU monitoring.      Brief Summary:  57 yo M w/ PMHx HTN, CAD s/p stent (2009), ICH (2008), ICM now s/p OHT with IABP on 12/25/2023.      24 Hour events:  - No acute events overnight.      OBJECTIVE:  Vital Signs Last 24 Hrs  T(C): 36.8 (30 Dec 2023 04:00), Max: 36.8 (30 Dec 2023 00:00)  T(F): 98.2 (30 Dec 2023 04:00), Max: 98.2 (30 Dec 2023 00:00)  HR: 90 (30 Dec 2023 07:00) (28 - 105)  BP: 136/68 (30 Dec 2023 07:00) (105/75 - 150/67)  BP(mean): 95 (30 Dec 2023 07:00) (79 - 96)  RR: 20 (30 Dec 2023 07:00) (13 - 39)  SpO2: 97% (30 Dec 2023 07:00) (91% - 99%)    Parameters below as of 30 Dec 2023 04:00  Patient On (Oxygen Delivery Method): nasal cannula  O2 Flow (L/min): 3                  Physical Exam:   General: awake, OOBTC  Neurology: AAOx4  Respiratory: on NC, non-labored respirations  CV: NSR  Abdominal: Soft, nontender  Extremities: warm, MOISE  Skin: No Rashes, Hematoma, Ecchymosis        -------------------------------------------------------------------------------------------------------------------------------    Labs:                        9.0    8.49  )-----------( 175      ( 30 Dec 2023 00:27 )             27.1     12-30    135  |  97  |  32<H>  ----------------------------<  174<H>  3.9   |  27  |  0.78    Ca    8.8      30 Dec 2023 00:27  Phos  1.9     12-30  Mg     2.1     12-30    TPro  6.1  /  Alb  3.9  /  TBili  0.3  /  DBili  x   /  AST  22  /  ALT  42  /  AlkPhos  45  12-30    LIVER FUNCTIONS - ( 30 Dec 2023 00:27 )  Alb: 3.9 g/dL / Pro: 6.1 g/dL / ALK PHOS: 45 U/L / ALT: 42 U/L / AST: 22 U/L / GGT: x             ABG - ( 30 Dec 2023 00:01 )  pH, Arterial: 7.49  pH, Blood: x     /  pCO2: 38    /  pO2: 81    / HCO3: 29    / Base Excess: 5.3   /  SaO2: 98.3      ------------------------------------------------------------------------------------------------------------------------------  Assessment:  57 yo M w/ PMHx HTN, CAD s/p stent (2009), ICH (2008), ICM now s/p OHT with IABP on 12/25/2023.    S/p OHT on 12/25/2023  At risk for hemodynamic instability and cardiac arrhythmias  Post op respiratory insufficiency  Acute blood loss anemia  Thrombocytopenia    Plan:   ***Neuro***  - Post-operative pain control with Tylenol, tramadol, Lyrica, and Robaxin for pain.  - Optimize day/night cycles.  - OOBTC / PT as tolerated    ***Cardiovascular***  - At risk for hemodynamic instability and cardiac arrhythmias.  - Wean milrinone to off.  - Hydralazine, Cardene for afterload reduction.  Wean cardene as tolerated.  - Hydral increased to 75 TID this AM  - Start Procardia for additional afterload reduction.  - Monitor chest tube output.     ***Pulmonary***.  - Acute post-operative respiratory insufficiency  - on Nasal Cannula. Wean oxygen as able.  - Deep breathing and coughing exercises.  - Continue bronchodilator as needed.     ***GI***  - Continue diet.  - Protonix for stress ulcer prophylaxis   - Bowel regimen.    ***Renal***  - Llamas catheter for strict I/O measurements.    - Replete electrolytes as indicated.  - Diuresis with Bumex  - Goal net negative 1L.    ***ID***  - Immunosuppression - tacro, cellcept, steroids. Simulect today.  - Nystatin for prophylaxis  - Started Valcyte for prophylaxis     ***Endocrine***  - Diabetes Mellitus 2  - Insulin gtt as needed to maintain euglycemia.      ***Hematology***  - Acute blood loss anemia  - SQ Heparin for DVT prophylaxis  - No current transfusion indication      acetaminophen     Tablet .. 650 milliGRAM(s) Oral every 6 hours  budesonide  80 MICROgram(s)/formoterol 4.5 MICROgram(s) Inhaler 1 Puff(s) Inhalation two times a day  buMETAnide Injectable 1 milliGRAM(s) IV Push every 12 hours  chlorhexidine 4% Liquid 1 Application(s) Topical <User Schedule>  dextrose 50% Injectable 50 milliLiter(s) IV Push every 15 minutes  heparin   Injectable 5000 Unit(s) SubCutaneous every 8 hours  hydrALAZINE 75 milliGRAM(s) Oral every 8 hours  insulin regular Infusion 3 Unit(s)/Hr (3 mL/Hr) IV Continuous <Continuous>  lidocaine   4% Patch 3 Patch Transdermal daily  methocarbamol 500 milliGRAM(s) Oral every 8 hours  methylPREDNISolone sodium succinate Injectable   IV Push   methylPREDNISolone sodium succinate Injectable 24 milliGRAM(s) IV Push every 12 hours  metoclopramide Injectable 10 milliGRAM(s) IV Push every 8 hours  mycophenolate mofetil 1000 milliGRAM(s) Oral every 12 hours  naloxegol 25 milliGRAM(s) Oral daily  niCARdipine Infusion 3 mG/Hr (15 mL/Hr) IV Continuous <Continuous>  NIFEdipine XL 60 milliGRAM(s) Oral daily  nystatin    Suspension 256559 Unit(s) Oral <User Schedule>  pantoprazole  Injectable 40 milliGRAM(s) IV Push daily  polyethylene glycol 3350 17 Gram(s) Oral daily  pregabalin 50 milliGRAM(s) Oral every 8 hours  senna 2 Tablet(s) Oral at bedtime  simethicone 160 milliGRAM(s) Chew every 8 hours  sodium chloride 0.9%. 1000 milliLiter(s) (10 mL/Hr) IV Continuous <Continuous>  tacrolimus 3 milliGRAM(s) Oral <User Schedule>  traMADol 25 milliGRAM(s) Oral every 8 hours          Patient requires continuous monitoring with bedside rhythm monitoring, pulse oximetry monitoring, and continuous central venous and arterial pressure monitoring; and intermittent blood gas analysis. Care plan discussed with the ICU care team.   Patient remained critical, at risk for life threatening decompensation.    I have spent 30 minutes providing critical care management to this patient.    By signing my name below, I, Rosalba Tapia, attest that this documentation has been prepared under the direction and in the presence of Tasha Shoemaker DO  Electronically signed: Rosalba Tapia, 12-30-23 @ 07:56    I, Tasha Shoemaker DO, personally performed the services described in this documentation. all medical record entries made by the scribe were at my direction and in my presence. I have reviewed the chart and agree that the record reflects my personal performance and is accurate and complete  Electronically signed: Tasha Shoemaker DO, Patient seen and examined at the bedside.    Remained critically ill on continuous ICU monitoring.      Brief Summary:  57 yo M w/ PMHx HTN, CAD s/p stent (2009), ICH (2008), ICM now s/p OHT with IABP on 12/25/2023.      24 Hour events:  - No acute events overnight.      OBJECTIVE:  Vital Signs Last 24 Hrs  T(C): 36.8 (30 Dec 2023 04:00), Max: 36.8 (30 Dec 2023 00:00)  T(F): 98.2 (30 Dec 2023 04:00), Max: 98.2 (30 Dec 2023 00:00)  HR: 90 (30 Dec 2023 07:00) (28 - 105)  BP: 136/68 (30 Dec 2023 07:00) (105/75 - 150/67)  BP(mean): 95 (30 Dec 2023 07:00) (79 - 96)  RR: 20 (30 Dec 2023 07:00) (13 - 39)  SpO2: 97% (30 Dec 2023 07:00) (91% - 99%)    Parameters below as of 30 Dec 2023 04:00  Patient On (Oxygen Delivery Method): nasal cannula  O2 Flow (L/min): 3                  Physical Exam:   General: awake, OOBTC  Neurology: AAOx4  Respiratory: on NC, non-labored respirations  CV: NSR  Abdominal: Soft, nontender  Extremities: warm, MOISE  Skin: No Rashes, Hematoma, Ecchymosis        -------------------------------------------------------------------------------------------------------------------------------    Labs:                        9.0    8.49  )-----------( 175      ( 30 Dec 2023 00:27 )             27.1     12-30    135  |  97  |  32<H>  ----------------------------<  174<H>  3.9   |  27  |  0.78    Ca    8.8      30 Dec 2023 00:27  Phos  1.9     12-30  Mg     2.1     12-30    TPro  6.1  /  Alb  3.9  /  TBili  0.3  /  DBili  x   /  AST  22  /  ALT  42  /  AlkPhos  45  12-30    LIVER FUNCTIONS - ( 30 Dec 2023 00:27 )  Alb: 3.9 g/dL / Pro: 6.1 g/dL / ALK PHOS: 45 U/L / ALT: 42 U/L / AST: 22 U/L / GGT: x             ABG - ( 30 Dec 2023 00:01 )  pH, Arterial: 7.49  pH, Blood: x     /  pCO2: 38    /  pO2: 81    / HCO3: 29    / Base Excess: 5.3   /  SaO2: 98.3      ------------------------------------------------------------------------------------------------------------------------------  Assessment:  57 yo M w/ PMHx HTN, CAD s/p stent (2009), ICH (2008), ICM now s/p OHT with IABP on 12/25/2023.    S/p OHT on 12/25/2023  At risk for hemodynamic instability and cardiac arrhythmias  Post op respiratory insufficiency  Acute blood loss anemia  Thrombocytopenia    Plan:   ***Neuro***  - Post-operative pain control with Tylenol, tramadol, Lyrica, and Robaxin for pain.  - Optimize day/night cycles.  - OOBTC / PT as tolerated    ***Cardiovascular***  - At risk for hemodynamic instability and cardiac arrhythmias.  - Wean milrinone to off.  - Hydralazine, Cardene for afterload reduction.  Wean cardene as tolerated.  - Hydral increased to 75 TID this AM  - Start Procardia for additional afterload reduction.  - Monitor chest tube output.     ***Pulmonary***.  - Acute post-operative respiratory insufficiency  - on Nasal Cannula. Wean oxygen as able.  - Deep breathing and coughing exercises.  - Continue bronchodilator as needed.     ***GI***  - Continue diet.  - Protonix for stress ulcer prophylaxis   - Bowel regimen.    ***Renal***  - Llamas catheter for strict I/O measurements.    - Replete electrolytes as indicated.  - Diuresis with Bumex  - Goal net negative 1L.    ***ID***  - Immunosuppression - tacro, cellcept, steroids. Simulect today.  - Nystatin for prophylaxis  - Started Valcyte for prophylaxis     ***Endocrine***  - Diabetes Mellitus 2  - Insulin gtt as needed to maintain euglycemia.      ***Hematology***  - Acute blood loss anemia  - SQ Heparin for DVT prophylaxis  - No current transfusion indication      acetaminophen     Tablet .. 650 milliGRAM(s) Oral every 6 hours  budesonide  80 MICROgram(s)/formoterol 4.5 MICROgram(s) Inhaler 1 Puff(s) Inhalation two times a day  buMETAnide Injectable 1 milliGRAM(s) IV Push every 12 hours  chlorhexidine 4% Liquid 1 Application(s) Topical <User Schedule>  dextrose 50% Injectable 50 milliLiter(s) IV Push every 15 minutes  heparin   Injectable 5000 Unit(s) SubCutaneous every 8 hours  hydrALAZINE 75 milliGRAM(s) Oral every 8 hours  insulin regular Infusion 3 Unit(s)/Hr (3 mL/Hr) IV Continuous <Continuous>  lidocaine   4% Patch 3 Patch Transdermal daily  methocarbamol 500 milliGRAM(s) Oral every 8 hours  methylPREDNISolone sodium succinate Injectable   IV Push   methylPREDNISolone sodium succinate Injectable 24 milliGRAM(s) IV Push every 12 hours  metoclopramide Injectable 10 milliGRAM(s) IV Push every 8 hours  mycophenolate mofetil 1000 milliGRAM(s) Oral every 12 hours  naloxegol 25 milliGRAM(s) Oral daily  niCARdipine Infusion 3 mG/Hr (15 mL/Hr) IV Continuous <Continuous>  NIFEdipine XL 60 milliGRAM(s) Oral daily  nystatin    Suspension 892377 Unit(s) Oral <User Schedule>  pantoprazole  Injectable 40 milliGRAM(s) IV Push daily  polyethylene glycol 3350 17 Gram(s) Oral daily  pregabalin 50 milliGRAM(s) Oral every 8 hours  senna 2 Tablet(s) Oral at bedtime  simethicone 160 milliGRAM(s) Chew every 8 hours  sodium chloride 0.9%. 1000 milliLiter(s) (10 mL/Hr) IV Continuous <Continuous>  tacrolimus 3 milliGRAM(s) Oral <User Schedule>  traMADol 25 milliGRAM(s) Oral every 8 hours          Patient requires continuous monitoring with bedside rhythm monitoring, pulse oximetry monitoring, and continuous central venous and arterial pressure monitoring; and intermittent blood gas analysis. Care plan discussed with the ICU care team.   Patient remained critical, at risk for life threatening decompensation.    I have spent 30 minutes providing critical care management to this patient.    By signing my name below, I, Rosalba Tapia, attest that this documentation has been prepared under the direction and in the presence of Tasha Shoemaker DO  Electronically signed: Rosalba Tapia, 12-30-23 @ 07:56    I, Tasha Shoemaker DO, personally performed the services described in this documentation. all medical record entries made by the scribe were at my direction and in my presence. I have reviewed the chart and agree that the record reflects my personal performance and is accurate and complete  Electronically signed: Tasha Shoemaker DO, Patient seen and examined at the bedside.    Remained critically ill on continuous ICU monitoring.      Brief Summary:  57 yo M w/ PMHx HTN, CAD s/p stent (2009), ICH (2008), ICM now s/p OHT with IABP on 12/25/2023.      24 Hour events:  - No acute events overnight.      OBJECTIVE:  Vital Signs Last 24 Hrs  T(C): 36.8 (30 Dec 2023 04:00), Max: 36.8 (30 Dec 2023 00:00)  T(F): 98.2 (30 Dec 2023 04:00), Max: 98.2 (30 Dec 2023 00:00)  HR: 90 (30 Dec 2023 07:00) (28 - 105)  BP: 136/68 (30 Dec 2023 07:00) (105/75 - 150/67)  BP(mean): 95 (30 Dec 2023 07:00) (79 - 96)  RR: 20 (30 Dec 2023 07:00) (13 - 39)  SpO2: 97% (30 Dec 2023 07:00) (91% - 99%)    Parameters below as of 30 Dec 2023 04:00  Patient On (Oxygen Delivery Method): nasal cannula  O2 Flow (L/min): 3                  Physical Exam:   General: awake, OOBTC  Neurology: AAOx4  Respiratory: on NC, non-labored respirations  CV: NSR  Abdominal: Soft, nontender  Extremities: warm, MOISE  Skin: No Rashes, Hematoma, Ecchymosis        -------------------------------------------------------------------------------------------------------------------------------    Labs:                        9.0    8.49  )-----------( 175      ( 30 Dec 2023 00:27 )             27.1     12-30    135  |  97  |  32<H>  ----------------------------<  174<H>  3.9   |  27  |  0.78    Ca    8.8      30 Dec 2023 00:27  Phos  1.9     12-30  Mg     2.1     12-30    TPro  6.1  /  Alb  3.9  /  TBili  0.3  /  DBili  x   /  AST  22  /  ALT  42  /  AlkPhos  45  12-30    LIVER FUNCTIONS - ( 30 Dec 2023 00:27 )  Alb: 3.9 g/dL / Pro: 6.1 g/dL / ALK PHOS: 45 U/L / ALT: 42 U/L / AST: 22 U/L / GGT: x             ABG - ( 30 Dec 2023 00:01 )  pH, Arterial: 7.49  pH, Blood: x     /  pCO2: 38    /  pO2: 81    / HCO3: 29    / Base Excess: 5.3   /  SaO2: 98.3      ------------------------------------------------------------------------------------------------------------------------------  Assessment:  57 yo M w/ PMHx HTN, CAD s/p stent (2009), ICH (2008), ICM now s/p OHT with IABP on 12/25/2023.    S/p OHT on 12/25/2023  At risk for hemodynamic instability and cardiac arrhythmias  Post op respiratory insufficiency  Acute blood loss anemia  Thrombocytopenia    Plan:   ***Neuro***  - Post-operative pain control with Tylenol, tramadol, Lyrica, and Robaxin for pain.  - Optimize day/night cycles.  - OOBTC / PT as tolerated    ***Cardiovascular***  - At risk for hemodynamic instability and cardiac arrhythmias.  - Wean milrinone to off.  - Hydralazine, Cardene for afterload reduction.  Wean cardene as tolerated.  - Start Procardia for additional afterload reduction.  - Monitor chest tube output.     ***Pulmonary***.  - Acute post-operative respiratory insufficiency  - on Nasal Cannula. Wean oxygen as able.  - Deep breathing and coughing exercises.  - Continue bronchodilator as needed.     ***GI***  - Continue diet.  - Protonix for stress ulcer prophylaxis   - Bowel regimen.    ***Renal***  - Llamas catheter for strict I/O measurements.    - Replete electrolytes as indicated.  - Diuresis with Bumex  - Goal net negative 1L.    ***ID***  - Immunosuppression - tacro, cellcept, steroids. Simulect today.  - Nystatin for prophylaxis  - Started Valcyte for prophylaxis     ***Endocrine***  - Diabetes Mellitus 2  - Insulin gtt as needed to maintain euglycemia.      ***Hematology***  - Acute blood loss anemia  - SQ Heparin for DVT prophylaxis  - No current transfusion indication      budesonide  80 MICROgram(s)/formoterol 4.5 MICROgram(s) Inhaler 1 Puff(s) Inhalation two times a day  buMETAnide Injectable 1 milliGRAM(s) IV Push every 12 hours      methylPREDNISolone sodium succinate Injectable   IV Push     metoclopramide Injectable 10 milliGRAM(s) IV Push every 8 hours  mycophenolate mofetil 1000 milliGRAM(s) Oral every 12 hours  naloxegol 25 milliGRAM(s) Oral daily    NIFEdipine XL 60 milliGRAM(s) Oral daily    pregabalin 50 milliGRAM(s) Oral every 8 hours              Patient requires continuous monitoring with bedside rhythm monitoring, pulse oximetry monitoring, and continuous central venous and arterial pressure monitoring; and intermittent blood gas analysis. Care plan discussed with the ICU care team.   Patient remained critical, at risk for life threatening decompensation.    I have spent 30 minutes providing critical care management to this patient.    By signing my name below, I, Rosalba Tapia, attest that this documentation has been prepared under the direction and in the presence of Tasha Shoemaker DO  Electronically signed: Rosalba Tapia, 12-30-23 @ 07:56    I, Tasha Shoemaker DO, personally performed the services described in this documentation. all medical record entries made by the scribe were at my direction and in my presence. I have reviewed the chart and agree that the record reflects my personal performance and is accurate and complete  Electronically signed: Tasha Shoemaker DO, Patient seen and examined at the bedside.    Remained critically ill on continuous ICU monitoring.      Brief Summary:  59 yo M w/ PMHx HTN, CAD s/p stent (2009), ICH (2008), ICM now s/p OHT with IABP on 12/25/2023.      24 Hour events:  - No acute events overnight.      OBJECTIVE:  Vital Signs Last 24 Hrs  T(C): 36.8 (30 Dec 2023 04:00), Max: 36.8 (30 Dec 2023 00:00)  T(F): 98.2 (30 Dec 2023 04:00), Max: 98.2 (30 Dec 2023 00:00)  HR: 90 (30 Dec 2023 07:00) (28 - 105)  BP: 136/68 (30 Dec 2023 07:00) (105/75 - 150/67)  BP(mean): 95 (30 Dec 2023 07:00) (79 - 96)  RR: 20 (30 Dec 2023 07:00) (13 - 39)  SpO2: 97% (30 Dec 2023 07:00) (91% - 99%)    Parameters below as of 30 Dec 2023 04:00  Patient On (Oxygen Delivery Method): nasal cannula  O2 Flow (L/min): 3                  Physical Exam:   General: awake, OOBTC  Neurology: AAOx4  Respiratory: on NC, non-labored respirations  CV: NSR  Abdominal: Soft, nontender  Extremities: warm, MOISE  Skin: No Rashes, Hematoma, Ecchymosis        -------------------------------------------------------------------------------------------------------------------------------    Labs:                        9.0    8.49  )-----------( 175      ( 30 Dec 2023 00:27 )             27.1     12-30    135  |  97  |  32<H>  ----------------------------<  174<H>  3.9   |  27  |  0.78    Ca    8.8      30 Dec 2023 00:27  Phos  1.9     12-30  Mg     2.1     12-30    TPro  6.1  /  Alb  3.9  /  TBili  0.3  /  DBili  x   /  AST  22  /  ALT  42  /  AlkPhos  45  12-30    LIVER FUNCTIONS - ( 30 Dec 2023 00:27 )  Alb: 3.9 g/dL / Pro: 6.1 g/dL / ALK PHOS: 45 U/L / ALT: 42 U/L / AST: 22 U/L / GGT: x             ABG - ( 30 Dec 2023 00:01 )  pH, Arterial: 7.49  pH, Blood: x     /  pCO2: 38    /  pO2: 81    / HCO3: 29    / Base Excess: 5.3   /  SaO2: 98.3      ------------------------------------------------------------------------------------------------------------------------------  Assessment:  59 yo M w/ PMHx HTN, CAD s/p stent (2009), ICH (2008), ICM now s/p OHT with IABP on 12/25/2023.    S/p OHT on 12/25/2023  At risk for hemodynamic instability and cardiac arrhythmias  Post op respiratory insufficiency  Acute blood loss anemia  Thrombocytopenia    Plan:   ***Neuro***  - Post-operative pain control with Tylenol, tramadol, Lyrica, and Robaxin for pain.  - Optimize day/night cycles.  - OOBTC / PT as tolerated    ***Cardiovascular***  - At risk for hemodynamic instability and cardiac arrhythmias.  - Wean milrinone to off.  - Hydralazine, Cardene for afterload reduction.  Wean cardene as tolerated.  - Start Procardia for additional afterload reduction.  - Monitor chest tube output.     ***Pulmonary***.  - Acute post-operative respiratory insufficiency  - on Nasal Cannula. Wean oxygen as able.  - Deep breathing and coughing exercises.  - Continue bronchodilator as needed.     ***GI***  - Continue diet.  - Protonix for stress ulcer prophylaxis   - Bowel regimen.    ***Renal***  - Llamas catheter for strict I/O measurements.    - Replete electrolytes as indicated.  - Diuresis with Bumex  - Goal net negative 1L.    ***ID***  - Immunosuppression - tacro, cellcept, steroids. Simulect today.  - Nystatin for prophylaxis  - Started Valcyte for prophylaxis     ***Endocrine***  - Diabetes Mellitus 2  - Insulin gtt as needed to maintain euglycemia.      ***Hematology***  - Acute blood loss anemia  - SQ Heparin for DVT prophylaxis  - No current transfusion indication      budesonide  80 MICROgram(s)/formoterol 4.5 MICROgram(s) Inhaler 1 Puff(s) Inhalation two times a day  buMETAnide Injectable 1 milliGRAM(s) IV Push every 12 hours      methylPREDNISolone sodium succinate Injectable   IV Push     metoclopramide Injectable 10 milliGRAM(s) IV Push every 8 hours  mycophenolate mofetil 1000 milliGRAM(s) Oral every 12 hours  naloxegol 25 milliGRAM(s) Oral daily    NIFEdipine XL 60 milliGRAM(s) Oral daily    pregabalin 50 milliGRAM(s) Oral every 8 hours              Patient requires continuous monitoring with bedside rhythm monitoring, pulse oximetry monitoring, and continuous central venous and arterial pressure monitoring; and intermittent blood gas analysis. Care plan discussed with the ICU care team.   Patient remained critical, at risk for life threatening decompensation.    I have spent 30 minutes providing critical care management to this patient.    By signing my name below, I, Rosalba Tapia, attest that this documentation has been prepared under the direction and in the presence of Tasha Shoemaker DO  Electronically signed: Rosalba Tapia, 12-30-23 @ 07:56    I, Tasha Shoemaker DO, personally performed the services described in this documentation. all medical record entries made by the scribe were at my direction and in my presence. I have reviewed the chart and agree that the record reflects my personal performance and is accurate and complete  Electronically signed: Tasha Shoemaker DO, Patient seen and examined at the bedside.    Remained critically ill on continuous ICU monitoring.      Brief Summary:  59 yo M w/ PMHx HTN, CAD s/p stent (2009), ICH (2008), ICM now s/p OHT with IABP on 12/25/2023.      24 Hour events:  - No acute events overnight.      OBJECTIVE:  Vital Signs Last 24 Hrs  T(C): 36.8 (30 Dec 2023 04:00), Max: 36.8 (30 Dec 2023 00:00)  T(F): 98.2 (30 Dec 2023 04:00), Max: 98.2 (30 Dec 2023 00:00)  HR: 90 (30 Dec 2023 07:00) (28 - 105)  BP: 136/68 (30 Dec 2023 07:00) (105/75 - 150/67)  BP(mean): 95 (30 Dec 2023 07:00) (79 - 96)  RR: 20 (30 Dec 2023 07:00) (13 - 39)  SpO2: 97% (30 Dec 2023 07:00) (91% - 99%)    Parameters below as of 30 Dec 2023 04:00  Patient On (Oxygen Delivery Method): nasal cannula  O2 Flow (L/min): 3                  Physical Exam:   General: awake, OOBTC  Neurology: AAOx4  Respiratory: on NC, non-labored respirations  CV: NSR  Abdominal: Soft, nontender  Extremities: warm, MOISE  Skin: No Rashes, Hematoma, Ecchymosis        -------------------------------------------------------------------------------------------------------------------------------    Labs:                        9.0    8.49  )-----------( 175      ( 30 Dec 2023 00:27 )             27.1     12-30    135  |  97  |  32<H>  ----------------------------<  174<H>  3.9   |  27  |  0.78    Ca    8.8      30 Dec 2023 00:27  Phos  1.9     12-30  Mg     2.1     12-30    TPro  6.1  /  Alb  3.9  /  TBili  0.3  /  DBili  x   /  AST  22  /  ALT  42  /  AlkPhos  45  12-30    LIVER FUNCTIONS - ( 30 Dec 2023 00:27 )  Alb: 3.9 g/dL / Pro: 6.1 g/dL / ALK PHOS: 45 U/L / ALT: 42 U/L / AST: 22 U/L / GGT: x             ABG - ( 30 Dec 2023 00:01 )  pH, Arterial: 7.49  pH, Blood: x     /  pCO2: 38    /  pO2: 81    / HCO3: 29    / Base Excess: 5.3   /  SaO2: 98.3      ------------------------------------------------------------------------------------------------------------------------------  Assessment:  59 yo M w/ PMHx HTN, CAD s/p stent (2009), ICH (2008), ICM now s/p OHT with IABP on 12/25/2023.    S/p OHT on 12/25/2023  At risk for hemodynamic instability and cardiac arrhythmias  Post op respiratory insufficiency  Acute blood loss anemia  Thrombocytopenia    Plan:   ***Neuro***  - Post-operative pain control with Tylenol, tramadol, Lyrica, and Robaxin for pain.  - Optimize day/night cycles.  - OOBTC / PT as tolerated    ***Cardiovascular***  - At risk for hemodynamic instability and cardiac arrhythmias.  - Wean milrinone to off.  - Hydralazine, Cardene for afterload reduction.  Wean cardene as tolerated.  - Start Procardia for additional afterload reduction.  - Monitor chest tube output.     ***Pulmonary***.  - Acute post-operative respiratory insufficiency  - on Nasal Cannula. Wean oxygen as able.  - Deep breathing and coughing exercises.  - Continue bronchodilator as needed.     ***GI***  - Continue diet.  - Protonix for stress ulcer prophylaxis   - Bowel regimen.    ***Renal***  - Llamas catheter for strict I/O measurements.    - Replete electrolytes as indicated.  - Diuresis with Bumex  - Goal net negative 1L.    ***ID***  - Immunosuppression - tacro, cellcept, steroids.   - Nystatin for prophylaxis  - Started Valcyte for prophylaxis     ***Endocrine***  - DM2  - Insulin gtt as needed to maintain euglycemia.      ***Hematology***  - Acute blood loss anemia  - SQ Heparin for DVT prophylaxis  - No current transfusion indication        metoclopramide Injectable 10 milliGRAM(s) IV Push every 8 hours  mycophenolate mofetil 1000 milliGRAM(s) Oral every 12 hours    NIFEdipine XL 60 milliGRAM(s) Oral daily    pregabalin 50 milliGRAM(s) Oral every 8 hours              Patient requires continuous monitoring with bedside rhythm monitoring, pulse oximetry monitoring, and continuous central venous and arterial pressure monitoring; and intermittent blood gas analysis. Care plan discussed with the ICU care team.   Patient remained critical, at risk for life threatening decompensation.    I have spent 30 minutes providing critical care management to this patient.    By signing my name below, I, Christietiffany Regina, attest that this documentation has been prepared under the direction and in the presence of Tasha Shoemaker DO  Electronically signed: Rosalba Tapia, 12-30-23 @ 07:56    I, Tasha Shoemaker DO, personally performed the services described in this documentation. all medical record entries made by the scribe were at my direction and in my presence. I have reviewed the chart and agree that the record reflects my personal performance and is accurate and complete  Electronically signed: Tasha Shoemaker DO, Patient seen and examined at the bedside.    Remained critically ill on continuous ICU monitoring.      Brief Summary:  57 yo M w/ PMHx HTN, CAD s/p stent (2009), ICH (2008), ICM now s/p OHT with IABP on 12/25/2023.      24 Hour events:  - No acute events overnight.      OBJECTIVE:  Vital Signs Last 24 Hrs  T(C): 36.8 (30 Dec 2023 04:00), Max: 36.8 (30 Dec 2023 00:00)  T(F): 98.2 (30 Dec 2023 04:00), Max: 98.2 (30 Dec 2023 00:00)  HR: 90 (30 Dec 2023 07:00) (28 - 105)  BP: 136/68 (30 Dec 2023 07:00) (105/75 - 150/67)  BP(mean): 95 (30 Dec 2023 07:00) (79 - 96)  RR: 20 (30 Dec 2023 07:00) (13 - 39)  SpO2: 97% (30 Dec 2023 07:00) (91% - 99%)    Parameters below as of 30 Dec 2023 04:00  Patient On (Oxygen Delivery Method): nasal cannula  O2 Flow (L/min): 3                  Physical Exam:   General: awake, OOBTC  Neurology: AAOx4  Respiratory: on NC, non-labored respirations  CV: NSR  Abdominal: Soft, nontender  Extremities: warm, MOISE  Skin: No Rashes, Hematoma, Ecchymosis        -------------------------------------------------------------------------------------------------------------------------------    Labs:                        9.0    8.49  )-----------( 175      ( 30 Dec 2023 00:27 )             27.1     12-30    135  |  97  |  32<H>  ----------------------------<  174<H>  3.9   |  27  |  0.78    Ca    8.8      30 Dec 2023 00:27  Phos  1.9     12-30  Mg     2.1     12-30    TPro  6.1  /  Alb  3.9  /  TBili  0.3  /  DBili  x   /  AST  22  /  ALT  42  /  AlkPhos  45  12-30    LIVER FUNCTIONS - ( 30 Dec 2023 00:27 )  Alb: 3.9 g/dL / Pro: 6.1 g/dL / ALK PHOS: 45 U/L / ALT: 42 U/L / AST: 22 U/L / GGT: x             ABG - ( 30 Dec 2023 00:01 )  pH, Arterial: 7.49  pH, Blood: x     /  pCO2: 38    /  pO2: 81    / HCO3: 29    / Base Excess: 5.3   /  SaO2: 98.3      ------------------------------------------------------------------------------------------------------------------------------  Assessment:  57 yo M w/ PMHx HTN, CAD s/p stent (2009), ICH (2008), ICM now s/p OHT with IABP on 12/25/2023.    S/p OHT on 12/25/2023  At risk for hemodynamic instability and cardiac arrhythmias  Post op respiratory insufficiency  Acute blood loss anemia  Thrombocytopenia    Plan:   ***Neuro***  - Post-operative pain control with Tylenol, tramadol, Lyrica, and Robaxin for pain.  - Optimize day/night cycles.  - OOBTC / PT as tolerated    ***Cardiovascular***  - At risk for hemodynamic instability and cardiac arrhythmias.  - Wean milrinone to off.  - Hydralazine, Cardene for afterload reduction.  Wean cardene as tolerated.  - Start Procardia for additional afterload reduction.  - Monitor chest tube output.     ***Pulmonary***.  - Acute post-operative respiratory insufficiency  - on Nasal Cannula. Wean oxygen as able.  - Deep breathing and coughing exercises.  - Continue bronchodilator as needed.     ***GI***  - Continue diet.  - Protonix for stress ulcer prophylaxis   - Bowel regimen.    ***Renal***  - Llamas catheter for strict I/O measurements.    - Replete electrolytes as indicated.  - Diuresis with Bumex  - Goal net negative 1L.    ***ID***  - Immunosuppression - tacro, cellcept, steroids.   - Nystatin for prophylaxis  - Started Valcyte for prophylaxis     ***Endocrine***  - DM2  - Insulin gtt as needed to maintain euglycemia.      ***Hematology***  - Acute blood loss anemia  - SQ Heparin for DVT prophylaxis  - No current transfusion indication        metoclopramide Injectable 10 milliGRAM(s) IV Push every 8 hours  mycophenolate mofetil 1000 milliGRAM(s) Oral every 12 hours    NIFEdipine XL 60 milliGRAM(s) Oral daily    pregabalin 50 milliGRAM(s) Oral every 8 hours              Patient requires continuous monitoring with bedside rhythm monitoring, pulse oximetry monitoring, and continuous central venous and arterial pressure monitoring; and intermittent blood gas analysis. Care plan discussed with the ICU care team.   Patient remained critical, at risk for life threatening decompensation.    I have spent 30 minutes providing critical care management to this patient.    By signing my name below, I, Christietiffany Regina, attest that this documentation has been prepared under the direction and in the presence of aTsha Shoemaker DO  Electronically signed: Rosalba Tapia, 12-30-23 @ 07:56    I, Tasha Shoemaker DO, personally performed the services described in this documentation. all medical record entries made by the scribe were at my direction and in my presence. I have reviewed the chart and agree that the record reflects my personal performance and is accurate and complete  Electronically signed: Tasha Shoemaker DO, Patient seen and examined at the bedside.    Remained critically ill on continuous ICU monitoring.      Brief Summary:  57 yo M w/ PMHx HTN, CAD s/p stent (2009), ICH (2008), ICM now s/p OHT with IABP on 12/25/2023.      24 Hour events:  - No acute events overnight.      OBJECTIVE:  Vital Signs Last 24 Hrs  T(C): 36.8 (30 Dec 2023 04:00), Max: 36.8 (30 Dec 2023 00:00)  T(F): 98.2 (30 Dec 2023 04:00), Max: 98.2 (30 Dec 2023 00:00)  HR: 90 (30 Dec 2023 07:00) (28 - 105)  BP: 136/68 (30 Dec 2023 07:00) (105/75 - 150/67)  BP(mean): 95 (30 Dec 2023 07:00) (79 - 96)  RR: 20 (30 Dec 2023 07:00) (13 - 39)  SpO2: 97% (30 Dec 2023 07:00) (91% - 99%)    Parameters below as of 30 Dec 2023 04:00  Patient On (Oxygen Delivery Method): nasal cannula  O2 Flow (L/min): 3                  Physical Exam:   General: awake, OOBTC  Neurology: AAOx4  Respiratory: on NC, non-labored respirations  CV: NSR  Abdominal: Soft, nontender  Extremities: warm, MOISE  Skin: No Rashes, Hematoma, Ecchymosis        -------------------------------------------------------------------------------------------------------------------------------    Labs:                        9.0    8.49  )-----------( 175      ( 30 Dec 2023 00:27 )             27.1     12-30    135  |  97  |  32<H>  ----------------------------<  174<H>  3.9   |  27  |  0.78    Ca    8.8      30 Dec 2023 00:27  Phos  1.9     12-30  Mg     2.1     12-30    TPro  6.1  /  Alb  3.9  /  TBili  0.3  /  DBili  x   /  AST  22  /  ALT  42  /  AlkPhos  45  12-30    LIVER FUNCTIONS - ( 30 Dec 2023 00:27 )  Alb: 3.9 g/dL / Pro: 6.1 g/dL / ALK PHOS: 45 U/L / ALT: 42 U/L / AST: 22 U/L / GGT: x             ABG - ( 30 Dec 2023 00:01 )  pH, Arterial: 7.49  pH, Blood: x     /  pCO2: 38    /  pO2: 81    / HCO3: 29    / Base Excess: 5.3   /  SaO2: 98.3      ------------------------------------------------------------------------------------------------------------------------------  Assessment:  57 yo M w/ PMHx HTN, CAD s/p stent (2009), ICH (2008), ICM now s/p OHT with IABP on 12/25/2023.    S/p OHT on 12/25/2023  At risk for hemodynamic instability and cardiac arrhythmias  Post op respiratory insufficiency  Acute blood loss anemia  Thrombocytopenia    Plan:   ***Neuro***  - Post-operative pain control with Tylenol, tramadol, Lyrica, and Robaxin for pain.  - Optimize day/night cycles.  - OOBTC / PT as tolerated    ***Cardiovascular***  - At risk for hemodynamic instability and cardiac arrhythmias.  - Wean milrinone to off.  - Hydralazine, Cardene for afterload reduction.  Wean cardene as tolerated.  - Start Procardia for additional afterload reduction.  - Monitor chest tube output.     ***Pulmonary***.  - Acute post-operative respiratory insufficiency  - on Nasal Cannula. Wean oxygen as able.  - Deep breathing and coughing exercises.  - Continue bronchodilator as needed.     ***GI***  - Continue diet.  - Protonix for stress ulcer prophylaxis   - Bowel regimen.    ***Renal***  - Llamas catheter for strict I/O measurements.    - Replete electrolytes as indicated.  - Diuresis with Bumex  - Goal net negative 1L.    ***ID***  - Immunosuppression - tacro, cellcept, steroids.   - Nystatin for prophylaxis  - Started Valcyte for prophylaxis     ***Endocrine***  - DM2  - Insulin gtt as needed to maintain euglycemia.      ***Hematology***  - Acute blood loss anemia  - SQ Heparin for DVT prophylaxis  - No current transfusion indication              Patient requires continuous monitoring with bedside rhythm monitoring, pulse oximetry monitoring, and continuous central venous and arterial pressure monitoring; and intermittent blood gas analysis. Care plan discussed with the ICU care team.   Patient remained critical, at risk for life threatening decompensation.    I have spent 30 minutes providing critical care management to this patient.    By signing my name below, I, Rosalba Tapia, attest that this documentation has been prepared under the direction and in the presence of Tasha Shoemaker DO  Electronically signed: Rosalba Tapia, 12-30-23 @ 07:56    I, Tasha Shoemaker DO, personally performed the services described in this documentation. all medical record entries made by the scribe were at my direction and in my presence. I have reviewed the chart and agree that the record reflects my personal performance and is accurate and complete  Electronically signed: Tasha Shoemaker DO, Patient seen and examined at the bedside.    Remained critically ill on continuous ICU monitoring.      Brief Summary:  57 yo M w/ PMHx HTN, CAD s/p stent (2009), ICH (2008), ICM now s/p OHT with IABP on 12/25/2023.      24 Hour events:  - No acute events overnight.  - Llamas catheter - dced yesterday     OBJECTIVE:  Vital Signs Last 24 Hrs  T(C): 36.8 (30 Dec 2023 04:00), Max: 36.8 (30 Dec 2023 00:00)  T(F): 98.2 (30 Dec 2023 04:00), Max: 98.2 (30 Dec 2023 00:00)  HR: 90 (30 Dec 2023 07:00) (28 - 105)  BP: 136/68 (30 Dec 2023 07:00) (105/75 - 150/67)  BP(mean): 95 (30 Dec 2023 07:00) (79 - 96)  RR: 20 (30 Dec 2023 07:00) (13 - 39)  SpO2: 97% (30 Dec 2023 07:00) (91% - 99%)    Parameters below as of 30 Dec 2023 04:00  Patient On (Oxygen Delivery Method): nasal cannula  O2 Flow (L/min): 3                  Physical Exam:   General: awake, OOBTC, ambulated   Neurology: AAOx4  Respiratory: on NC, non-labored respirations  CV: NSR  Abdominal: Soft, nontender  Extremities: warm, MOISE  Skin: No Rashes, Hematoma, Ecchymosis        -------------------------------------------------------------------------------------------------------------------------------    Labs:                        9.0    8.49  )-----------( 175      ( 30 Dec 2023 00:27 )             27.1     12-30    135  |  97  |  32<H>  ----------------------------<  174<H>  3.9   |  27  |  0.78    Ca    8.8      30 Dec 2023 00:27  Phos  1.9     12-30  Mg     2.1     12-30    TPro  6.1  /  Alb  3.9  /  TBili  0.3  /  DBili  x   /  AST  22  /  ALT  42  /  AlkPhos  45  12-30    LIVER FUNCTIONS - ( 30 Dec 2023 00:27 )  Alb: 3.9 g/dL / Pro: 6.1 g/dL / ALK PHOS: 45 U/L / ALT: 42 U/L / AST: 22 U/L / GGT: x             ABG - ( 30 Dec 2023 00:01 )  pH, Arterial: 7.49  pH, Blood: x     /  pCO2: 38    /  pO2: 81    / HCO3: 29    / Base Excess: 5.3   /  SaO2: 98.3      ------------------------------------------------------------------------------------------------------------------------------  Assessment:  57 yo M w/ PMHx HTN, CAD s/p stent (2009), ICH (2008), ICM now s/p OHT with IABP on 12/25/2023.    S/p OHT on 12/25/2023  At risk for hemodynamic instability and cardiac arrhythmias  Post op respiratory insufficiency  Acute blood loss anemia    Plan:   ***Neuro***  - Post-operative pain control with Tylenol, tramadol, Lyrica, and Robaxin for pain.  - Optimize day/night cycles.  - OOBTC / PT as tolerated    ***Cardiovascular***  - At risk for hemodynamic instability and cardiac arrhythmias.  - Milrinone weaned to off.  - Hydralazine, Cardene for afterload reduction.  Will wean cardene as tolerated.  - Start Procardia for additional afterload reduction.  - Monitor chest tube output.     ***Pulmonary***.  - Acute post-operative respiratory insufficiency  - on Nasal Cannula. Wean oxygen as able.  - Deep breathing and coughing exercises.  - Continue bronchodilator as needed.     ***GI***  - Continue diet.  - Protonix for stress ulcer prophylaxis   - Bowel regimen.    ***Renal***  - Llamas catheter - dced    - Replete electrolytes as indicated.  - Diuresis with Bumex  - Goal net negative 1L.    ***ID***  - Immunosuppression - tacro, cellcept, steroids.   - Nystatin for prophylaxis  - Started Valcyte for prophylaxis     ***Endocrine***  - DM2  - Insulin gtt as needed to maintain euglycemia - will transition to ISS     ***Hematology***  - Acute blood loss anemia  - SQ Heparin for DVT prophylaxis  - No current transfusion indication              Patient requires continuous monitoring with bedside rhythm monitoring, pulse oximetry monitoring, and continuous central venous and arterial pressure monitoring; and intermittent blood gas analysis. Care plan discussed with the ICU care team.   Patient remained critical, at risk for life threatening decompensation.    I have spent 30 minutes providing critical care management to this patient.    By signing my name below, I, Rosalba Tapia, attest that this documentation has been prepared under the direction and in the presence of Tasha Shoemaker DO  Electronically signed: Rosalba Tapia, 12-30-23 @ 07:56    I, Tasha Shoemaker DO, personally performed the services described in this documentation. all medical record entries made by the scribe were at my direction and in my presence. I have reviewed the chart and agree that the record reflects my personal performance and is accurate and complete  Electronically signed: Tasha Shoemaker DO, Patient seen and examined at the bedside.    Remained critically ill on continuous ICU monitoring.      Brief Summary:  59 yo M w/ PMHx HTN, CAD s/p stent (2009), ICH (2008), ICM now s/p OHT with IABP on 12/25/2023.      24 Hour events:  - No acute events overnight.  - Llamas catheter - dced yesterday     OBJECTIVE:  Vital Signs Last 24 Hrs  T(C): 36.8 (30 Dec 2023 04:00), Max: 36.8 (30 Dec 2023 00:00)  T(F): 98.2 (30 Dec 2023 04:00), Max: 98.2 (30 Dec 2023 00:00)  HR: 90 (30 Dec 2023 07:00) (28 - 105)  BP: 136/68 (30 Dec 2023 07:00) (105/75 - 150/67)  BP(mean): 95 (30 Dec 2023 07:00) (79 - 96)  RR: 20 (30 Dec 2023 07:00) (13 - 39)  SpO2: 97% (30 Dec 2023 07:00) (91% - 99%)    Parameters below as of 30 Dec 2023 04:00  Patient On (Oxygen Delivery Method): nasal cannula  O2 Flow (L/min): 3                  Physical Exam:   General: awake, OOBTC, ambulated   Neurology: AAOx4  Respiratory: on NC, non-labored respirations  CV: NSR  Abdominal: Soft, nontender  Extremities: warm, MOISE  Skin: No Rashes, Hematoma, Ecchymosis        -------------------------------------------------------------------------------------------------------------------------------    Labs:                        9.0    8.49  )-----------( 175      ( 30 Dec 2023 00:27 )             27.1     12-30    135  |  97  |  32<H>  ----------------------------<  174<H>  3.9   |  27  |  0.78    Ca    8.8      30 Dec 2023 00:27  Phos  1.9     12-30  Mg     2.1     12-30    TPro  6.1  /  Alb  3.9  /  TBili  0.3  /  DBili  x   /  AST  22  /  ALT  42  /  AlkPhos  45  12-30    LIVER FUNCTIONS - ( 30 Dec 2023 00:27 )  Alb: 3.9 g/dL / Pro: 6.1 g/dL / ALK PHOS: 45 U/L / ALT: 42 U/L / AST: 22 U/L / GGT: x             ABG - ( 30 Dec 2023 00:01 )  pH, Arterial: 7.49  pH, Blood: x     /  pCO2: 38    /  pO2: 81    / HCO3: 29    / Base Excess: 5.3   /  SaO2: 98.3      ------------------------------------------------------------------------------------------------------------------------------  Assessment:  59 yo M w/ PMHx HTN, CAD s/p stent (2009), ICH (2008), ICM now s/p OHT with IABP on 12/25/2023.    S/p OHT on 12/25/2023  At risk for hemodynamic instability and cardiac arrhythmias  Post op respiratory insufficiency  Acute blood loss anemia    Plan:   ***Neuro***  - Post-operative pain control with Tylenol, Tramadol, Lyrica, and Robaxin for pain.  - Optimize day/night cycles.  - OOBTC / PT as tolerated    ***Cardiovascular***  - At risk for hemodynamic instability and cardiac arrhythmias.  - Milrinone weaned to off.  - Hydralazine, Cardene, Nifedipine for afterload reduction.  Will wean cardene as tolerated.  - Start Procardia for additional afterload reduction.  - Monitor chest tube output.     ***Pulmonary***.  - Acute post-operative respiratory insufficiency  - on Nasal Cannula. Wean oxygen as able.  - Deep breathing and coughing exercises.  - Continue bronchodilator as needed.     ***GI***  - Continue diet.  - Protonix for stress ulcer prophylaxis   - Bowel regimen.    ***Renal***  - Llamas catheter - dced    - Replete electrolytes as indicated.  - Diuresis with Bumex  - Goal net negative 1L.    ***ID***  - Immunosuppression - tacro, cellcept, steroids.   - Nystatin for prophylaxis    ***Endocrine***  - DM2  - Insulin gtt as needed to maintain euglycemia - will transition to ISS     ***Hematology***  - Acute blood loss anemia  - SQ Heparin for DVT prophylaxis  - No current transfusion indication              Patient requires continuous monitoring with bedside rhythm monitoring, pulse oximetry monitoring, and continuous central venous and arterial pressure monitoring; and intermittent blood gas analysis. Care plan discussed with the ICU care team.   Patient remained critical, at risk for life threatening decompensation.    I have spent 30 minutes providing critical care management to this patient.    By signing my name below, I, Rosalba Tapia, attest that this documentation has been prepared under the direction and in the presence of Tasha Shoemaker DO  Electronically signed: Rosalba Tapia, 12-30-23 @ 07:56    I, Tasha Shoemaker DO, personally performed the services described in this documentation. all medical record entries made by the scribe were at my direction and in my presence. I have reviewed the chart and agree that the record reflects my personal performance and is accurate and complete  Electronically signed: Tasha Shoemaker DO, Patient seen and examined at the bedside.    Remained critically ill on continuous ICU monitoring.      Brief Summary:  57 yo M w/ PMHx HTN, CAD s/p stent (2009), ICH (2008), ICM now s/p OHT with IABP on 12/25/2023.      24 Hour events:  - No acute events overnight.  - Llamas catheter - dced yesterday     OBJECTIVE:  Vital Signs Last 24 Hrs  T(C): 36.8 (30 Dec 2023 04:00), Max: 36.8 (30 Dec 2023 00:00)  T(F): 98.2 (30 Dec 2023 04:00), Max: 98.2 (30 Dec 2023 00:00)  HR: 90 (30 Dec 2023 07:00) (28 - 105)  BP: 136/68 (30 Dec 2023 07:00) (105/75 - 150/67)  BP(mean): 95 (30 Dec 2023 07:00) (79 - 96)  RR: 20 (30 Dec 2023 07:00) (13 - 39)  SpO2: 97% (30 Dec 2023 07:00) (91% - 99%)    Parameters below as of 30 Dec 2023 04:00  Patient On (Oxygen Delivery Method): nasal cannula  O2 Flow (L/min): 3                  Physical Exam:   General: awake, OOBTC, ambulated   Neurology: AAOx4  Respiratory: on NC, non-labored respirations  CV: NSR  Abdominal: Soft, nontender  Extremities: warm, MOISE  Skin: No Rashes, Hematoma, Ecchymosis        -------------------------------------------------------------------------------------------------------------------------------    Labs:                        9.0    8.49  )-----------( 175      ( 30 Dec 2023 00:27 )             27.1     12-30    135  |  97  |  32<H>  ----------------------------<  174<H>  3.9   |  27  |  0.78    Ca    8.8      30 Dec 2023 00:27  Phos  1.9     12-30  Mg     2.1     12-30    TPro  6.1  /  Alb  3.9  /  TBili  0.3  /  DBili  x   /  AST  22  /  ALT  42  /  AlkPhos  45  12-30    LIVER FUNCTIONS - ( 30 Dec 2023 00:27 )  Alb: 3.9 g/dL / Pro: 6.1 g/dL / ALK PHOS: 45 U/L / ALT: 42 U/L / AST: 22 U/L / GGT: x             ABG - ( 30 Dec 2023 00:01 )  pH, Arterial: 7.49  pH, Blood: x     /  pCO2: 38    /  pO2: 81    / HCO3: 29    / Base Excess: 5.3   /  SaO2: 98.3      ------------------------------------------------------------------------------------------------------------------------------  Assessment:  57 yo M w/ PMHx HTN, CAD s/p stent (2009), ICH (2008), ICM now s/p OHT with IABP on 12/25/2023.    S/p OHT on 12/25/2023  At risk for hemodynamic instability and cardiac arrhythmias  Post op respiratory insufficiency  Acute blood loss anemia    Plan:   ***Neuro***  - Post-operative pain control with Tylenol, Tramadol, Lyrica, and Robaxin for pain.  - Optimize day/night cycles.  - OOBTC / PT as tolerated    ***Cardiovascular***  - At risk for hemodynamic instability and cardiac arrhythmias.  - Milrinone weaned to off.  - Hydralazine, Cardene, Nifedipine for afterload reduction.  Will wean cardene as tolerated.  - Start Procardia for additional afterload reduction.  - Monitor chest tube output.     ***Pulmonary***.  - Acute post-operative respiratory insufficiency  - on Nasal Cannula. Wean oxygen as able.  - Deep breathing and coughing exercises.  - Continue bronchodilator as needed.     ***GI***  - Continue diet.  - Protonix for stress ulcer prophylaxis   - Bowel regimen.    ***Renal***  - Llamas catheter - dced    - Replete electrolytes as indicated.  - Diuresis with Bumex  - Goal net negative 1L.    ***ID***  - Immunosuppression - tacro, cellcept, steroids.   - Nystatin for prophylaxis    ***Endocrine***  - DM2  - Insulin gtt as needed to maintain euglycemia - will transition to ISS     ***Hematology***  - Acute blood loss anemia  - SQ Heparin for DVT prophylaxis  - No current transfusion indication              Patient requires continuous monitoring with bedside rhythm monitoring, pulse oximetry monitoring, and continuous central venous and arterial pressure monitoring; and intermittent blood gas analysis. Care plan discussed with the ICU care team.   Patient remained critical, at risk for life threatening decompensation.    I have spent 30 minutes providing critical care management to this patient.    By signing my name below, I, Rosalba Tapia, attest that this documentation has been prepared under the direction and in the presence of aTsha Shoemaker DO  Electronically signed: Rosalba Tapia, 12-30-23 @ 07:56    I, Tasha Shoemaker DO, personally performed the services described in this documentation. all medical record entries made by the scribe were at my direction and in my presence. I have reviewed the chart and agree that the record reflects my personal performance and is accurate and complete  Electronically signed: Tasha Shoemaker DO, Patient seen and examined at the bedside.    Remained critically ill on continuous ICU monitoring.      Brief Summary:  57 yo M w/ PMHx HTN, CAD s/p stent (2009), ICH (2008), ICM now s/p OHT with IABP on 12/25/2023.      24 Hour events:  - No acute events overnight.  - Received simulect yesterday.    OBJECTIVE:  Vital Signs Last 24 Hrs  T(C): 36.8 (30 Dec 2023 04:00), Max: 36.8 (30 Dec 2023 00:00)  T(F): 98.2 (30 Dec 2023 04:00), Max: 98.2 (30 Dec 2023 00:00)  HR: 90 (30 Dec 2023 07:00) (28 - 105)  BP: 136/68 (30 Dec 2023 07:00) (105/75 - 150/67)  BP(mean): 95 (30 Dec 2023 07:00) (79 - 96)  RR: 20 (30 Dec 2023 07:00) (13 - 39)  SpO2: 97% (30 Dec 2023 07:00) (91% - 99%)    Parameters below as of 30 Dec 2023 04:00  Patient On (Oxygen Delivery Method): nasal cannula  O2 Flow (L/min): 3                  Physical Exam:   General: awake, out of bed to chair  Neurology: intact, AAOx4  Respiratory: on nasal cannula, non-labored respirations  CV: sinus rhythm  Abdominal: Soft, NT  Extremities: warm, well perfused        -------------------------------------------------------------------------------------------------------------------------------    Labs:                        9.0    8.49  )-----------( 175      ( 30 Dec 2023 00:27 )             27.1     12-30    135  |  97  |  32<H>  ----------------------------<  174<H>  3.9   |  27  |  0.78    Ca    8.8      30 Dec 2023 00:27  Phos  1.9     12-30  Mg     2.1     12-30    TPro  6.1  /  Alb  3.9  /  TBili  0.3  /  DBili  x   /  AST  22  /  ALT  42  /  AlkPhos  45  12-30    LIVER FUNCTIONS - ( 30 Dec 2023 00:27 )  Alb: 3.9 g/dL / Pro: 6.1 g/dL / ALK PHOS: 45 U/L / ALT: 42 U/L / AST: 22 U/L / GGT: x             ABG - ( 30 Dec 2023 00:01 )  pH, Arterial: 7.49  pH, Blood: x     /  pCO2: 38    /  pO2: 81    / HCO3: 29    / Base Excess: 5.3   /  SaO2: 98.3      ------------------------------------------------------------------------------------------------------------------------------  Assessment:  57 yo M w/ PMHx HTN, CAD s/p stent (2009), ICH (2008), ICM now s/p OHT with IABP on 12/25/2023.    S/p OHT on 12/25/2023  At risk for hemodynamic instability and cardiac arrhythmias  Post op respiratory insufficiency  Acute blood loss anemia    Plan:   ***Neuro***  - Post-operative pain control with Tylenol, Tramadol, Lyrica, and Robaxin for pain.  - Optimize day/night cycles.  - OOBTC / PT as tolerated    ***Cardiovascular***  - At risk for hemodynamic instability and cardiac arrhythmias.  - Hydralazine, Cardene, Nifedipine for afterload reduction.  Wean cardene as tolerated.  - Monitor chest tube output.   - EMB 1/2/24.  - D/c left pleural chest tube  - D/c CVL.    ***Pulmonary***.  - Acute post-operative respiratory insufficiency  - On Nasal Cannula. Wean oxygen as able.  - Deep breathing and coughing exercises.  - Continue bronchodilator as needed.     ***GI***  - Continue diet.  - Protonix for stress ulcer prophylaxis   - Bowel regimen.    ***Renal***  - Replete electrolytes as indicated.  - Diuresis with Bumex  - Goal net negative 1L.    ***ID***  - Immunosuppression - tacro, cellcept, steroids. Received simulect yesterday.  - Nystatin for prophylaxis    ***Endocrine***  - DM2  - Insulin gtt as needed to maintain euglycemia - will transition to ISS     ***Hematology***  - Acute blood loss anemia  - No current transfusion indication  - SQ Heparin for DVT prophylaxis            Patient requires continuous monitoring with bedside rhythm monitoring, pulse oximetry monitoring, and continuous central venous and arterial pressure monitoring; and intermittent blood gas analysis. Care plan discussed with the ICU care team.   Patient remained critical, at risk for life threatening decompensation.    I have spent 30 minutes providing critical care management to this patient.    By signing my name below, I, Rosalba Tapia, attest that this documentation has been prepared under the direction and in the presence of Tasha Shoemaker DO  Electronically signed: Rosalba Tapia, 12-30-23 @ 07:56    I, Tasha Shoemaker DO, personally performed the services described in this documentation. all medical record entries made by the scribe were at my direction and in my presence. I have reviewed the chart and agree that the record reflects my personal performance and is accurate and complete  Electronically signed: Tasha Shoemaker DO,

## 2023-12-30 NOTE — PROGRESS NOTE ADULT - PROBLEM SELECTOR PLAN 5
- pretransplant was noted to have positive BCx staph epi, Enterococcus faecalis and Cutibacterium   - s/p IABP exchange from RFA to LFA on 11/20.  - appreciated transplant ID recs.  - post transplant has been afebrile.

## 2023-12-30 NOTE — PROGRESS NOTE ADULT - SUBJECTIVE AND OBJECTIVE BOX
Subjective:  No acute events. No BM yet.     Medications:  acetaminophen     Tablet .. 650 milliGRAM(s) Oral every 6 hours  budesonide  80 MICROgram(s)/formoterol 4.5 MICROgram(s) Inhaler 1 Puff(s) Inhalation two times a day  buMETAnide Injectable 1 milliGRAM(s) IV Push every 12 hours  chlorhexidine 4% Liquid 1 Application(s) Topical <User Schedule>  dextrose 50% Injectable 50 milliLiter(s) IV Push every 15 minutes  heparin   Injectable 5000 Unit(s) SubCutaneous every 8 hours  hydrALAZINE 75 milliGRAM(s) Oral every 8 hours  insulin regular Infusion 3 Unit(s)/Hr IV Continuous <Continuous>  lidocaine   4% Patch 3 Patch Transdermal daily  methocarbamol 500 milliGRAM(s) Oral every 8 hours  methylPREDNISolone sodium succinate Injectable 24 milliGRAM(s) IV Push every 12 hours  methylPREDNISolone sodium succinate Injectable   IV Push   metoclopramide Injectable 10 milliGRAM(s) IV Push every 8 hours  mycophenolate mofetil 1000 milliGRAM(s) Oral every 12 hours  naloxegol 25 milliGRAM(s) Oral daily  niCARdipine Infusion 3 mG/Hr IV Continuous <Continuous>  NIFEdipine XL 60 milliGRAM(s) Oral daily  nystatin    Suspension 598638 Unit(s) Oral <User Schedule>  pantoprazole  Injectable 40 milliGRAM(s) IV Push daily  polyethylene glycol 3350 17 Gram(s) Oral daily  pregabalin 50 milliGRAM(s) Oral every 8 hours  senna 2 Tablet(s) Oral at bedtime  simethicone 160 milliGRAM(s) Chew every 8 hours  sodium chloride 0.9%. 1000 milliLiter(s) IV Continuous <Continuous>  tacrolimus 3 milliGRAM(s) Oral <User Schedule>  traMADol 25 milliGRAM(s) Oral every 8 hours    Vitals:  T(C): 36 (12-30-23 @ 08:00), Max: 36.8 (12-30-23 @ 00:00)  HR: 93 (12-30-23 @ 08:30) (28 - 105)  BP: 136/68 (12-30-23 @ 07:00) (105/75 - 150/67)  BP(mean): 95 (12-30-23 @ 07:00) (79 - 96)  ABP: 141/58 (12-30-23 @ 08:30) (124/49 - 308/308)  ABP(mean): 84 (12-30-23 @ 08:30) (67 - 308)  RR: 23 (12-30-23 @ 08:30) (13 - 39)  SpO2: 94% (12-30-23 @ 08:30) (91% - 99%)        I&O's Summary    29 Dec 2023 07:01  -  30 Dec 2023 07:00  --------------------------------------------------------  IN: 2401.8 mL / OUT: 4160 mL / NET: -1758.2 mL    30 Dec 2023 07:01  -  30 Dec 2023 09:12  --------------------------------------------------------  IN: 565 mL / OUT: 40 mL / NET: 525 mL        Physical Exam:  General: No distress. Comfortable in chair  HEENT: EOM intact.  Neck: Neck supple. JVP 8 cm H2O  Chest: Clear to auscultation bilaterally  CV: Normal S1 and S2. No murmurs, rub, or gallops. Radial pulses normal.  Abdomen: Soft, non-distended, non-tender  Extremities: Warm peripherally, no edema   Neurology: Alert and oriented times three. Sensation intact  Psych: Affect normal      Labs:                        9.0    8.49  )-----------( 175      ( 30 Dec 2023 00:27 )             27.1     12-30    135  |  97  |  32<H>  ----------------------------<  174<H>  3.9   |  27  |  0.78    Ca    8.8      30 Dec 2023 00:27  Phos  1.9     12-30  Mg     2.1     12-30    TPro  6.1  /  Alb  3.9  /  TBili  0.3  /  DBili  x   /  AST  22  /  ALT  42  /  AlkPhos  45  12-30        Tacrolimus (), Serum: 10.7 ng/mL (12-29-23 @ 06:25)  Tacrolimus (), Serum: 2.6 ng/mL (12-28-23 @ 07:20)  Tacrolimus (), Serum: <0.8 ng/mL (12-27-23 @ 06:44)     Subjective:  No acute events. No BM yet.     Medications:  acetaminophen     Tablet .. 650 milliGRAM(s) Oral every 6 hours  budesonide  80 MICROgram(s)/formoterol 4.5 MICROgram(s) Inhaler 1 Puff(s) Inhalation two times a day  buMETAnide Injectable 1 milliGRAM(s) IV Push every 12 hours  chlorhexidine 4% Liquid 1 Application(s) Topical <User Schedule>  dextrose 50% Injectable 50 milliLiter(s) IV Push every 15 minutes  heparin   Injectable 5000 Unit(s) SubCutaneous every 8 hours  hydrALAZINE 75 milliGRAM(s) Oral every 8 hours  insulin regular Infusion 3 Unit(s)/Hr IV Continuous <Continuous>  lidocaine   4% Patch 3 Patch Transdermal daily  methocarbamol 500 milliGRAM(s) Oral every 8 hours  methylPREDNISolone sodium succinate Injectable 24 milliGRAM(s) IV Push every 12 hours  methylPREDNISolone sodium succinate Injectable   IV Push   metoclopramide Injectable 10 milliGRAM(s) IV Push every 8 hours  mycophenolate mofetil 1000 milliGRAM(s) Oral every 12 hours  naloxegol 25 milliGRAM(s) Oral daily  niCARdipine Infusion 3 mG/Hr IV Continuous <Continuous>  NIFEdipine XL 60 milliGRAM(s) Oral daily  nystatin    Suspension 611215 Unit(s) Oral <User Schedule>  pantoprazole  Injectable 40 milliGRAM(s) IV Push daily  polyethylene glycol 3350 17 Gram(s) Oral daily  pregabalin 50 milliGRAM(s) Oral every 8 hours  senna 2 Tablet(s) Oral at bedtime  simethicone 160 milliGRAM(s) Chew every 8 hours  sodium chloride 0.9%. 1000 milliLiter(s) IV Continuous <Continuous>  tacrolimus 3 milliGRAM(s) Oral <User Schedule>  traMADol 25 milliGRAM(s) Oral every 8 hours    Vitals:  T(C): 36 (12-30-23 @ 08:00), Max: 36.8 (12-30-23 @ 00:00)  HR: 93 (12-30-23 @ 08:30) (28 - 105)  BP: 136/68 (12-30-23 @ 07:00) (105/75 - 150/67)  BP(mean): 95 (12-30-23 @ 07:00) (79 - 96)  ABP: 141/58 (12-30-23 @ 08:30) (124/49 - 308/308)  ABP(mean): 84 (12-30-23 @ 08:30) (67 - 308)  RR: 23 (12-30-23 @ 08:30) (13 - 39)  SpO2: 94% (12-30-23 @ 08:30) (91% - 99%)        I&O's Summary    29 Dec 2023 07:01  -  30 Dec 2023 07:00  --------------------------------------------------------  IN: 2401.8 mL / OUT: 4160 mL / NET: -1758.2 mL    30 Dec 2023 07:01  -  30 Dec 2023 09:12  --------------------------------------------------------  IN: 565 mL / OUT: 40 mL / NET: 525 mL        Physical Exam:  General: No distress. Comfortable in chair  HEENT: EOM intact.  Neck: Neck supple. JVP 8 cm H2O  Chest: Clear to auscultation bilaterally  CV: Normal S1 and S2. No murmurs, rub, or gallops. Radial pulses normal.  Abdomen: Soft, non-distended, non-tender  Extremities: Warm peripherally, no edema   Neurology: Alert and oriented times three. Sensation intact  Psych: Affect normal      Labs:                        9.0    8.49  )-----------( 175      ( 30 Dec 2023 00:27 )             27.1     12-30    135  |  97  |  32<H>  ----------------------------<  174<H>  3.9   |  27  |  0.78    Ca    8.8      30 Dec 2023 00:27  Phos  1.9     12-30  Mg     2.1     12-30    TPro  6.1  /  Alb  3.9  /  TBili  0.3  /  DBili  x   /  AST  22  /  ALT  42  /  AlkPhos  45  12-30        Tacrolimus (), Serum: 10.7 ng/mL (12-29-23 @ 06:25)  Tacrolimus (), Serum: 2.6 ng/mL (12-28-23 @ 07:20)  Tacrolimus (), Serum: <0.8 ng/mL (12-27-23 @ 06:44)

## 2023-12-30 NOTE — PROGRESS NOTE ADULT - PROBLEM SELECTOR PLAN 4
- pre transplant had baseline Cr 1.2, peaked to 4, now at relative baseline  - cardiorenal following  - diuretics as stated above   - monitor closely.

## 2023-12-30 NOTE — PROGRESS NOTE ADULT - PROBLEM SELECTOR PLAN 2
- s/p Simulect on POD 0, received second dose on POD 4 (12/29).  - Continue with Steroid taper per protocol   - Continue with Cellcept 1g IV BID  - Will continue to adjust tacro as needed for goal will be 12-14.

## 2023-12-30 NOTE — PROGRESS NOTE ADULT - SUBJECTIVE AND OBJECTIVE BOX
LD VALENCIA  58y Male  MRN:[id number]    Patient is a 58y old  Male who presents with a chief complaint of NSTEMI (01 Nov 2023 20:29)    HPI:  59yo M w/ hx HTN, CAD w/ 1 stent in 2009, ICH (2008) presenting with abn ekg. Patient presented to [facility] where he was found to have STEMI, recommended to get cath however patient did not want to get it there so it left and came here.  Patient initially had cough, congestion, fever, was placed on antibiotics on Sunday.  Started feeling nauseous and had a presyncopal event after which he presented to ED last night.  Had chest pain as well.  Chest pain is midsternal.  Not currently having chest pain.  Received 4 aspirin 30 min pta. (01 Nov 2023 15:11)      Patient seen and evaluated at bedside in CTU. interval events noted    Interval HPI:   no acute events o/n     PAST MEDICAL & SURGICAL HISTORY:  HTN (hypertension)      CAD (coronary artery disease)  2009; stent      Intracranial hemorrhage  2008      Respiratory arrest  december 1st      Myocardial infarction, unspecified MI type, unspecified artery      History of coronary artery stent placement          REVIEW OF SYSTEMS:  as per hpi     VITALS:   ICU Vital Signs Last 24 Hrs  T(C): 36.1 (30 Dec 2023 16:00), Max: 36.8 (30 Dec 2023 00:00)  T(F): 97 (30 Dec 2023 16:00), Max: 98.2 (30 Dec 2023 00:00)  HR: 97 (30 Dec 2023 18:15) (84 - 104)  BP: 136/68 (30 Dec 2023 07:00) (105/75 - 150/67)  BP(mean): 95 (30 Dec 2023 07:00) (79 - 96)  ABP: 149/63 (30 Dec 2023 18:15) (124/49 - 165/60)  ABP(mean): 86 (30 Dec 2023 18:15) (69 - 93)  RR: 28 (30 Dec 2023 18:15) (13 - 43)  SpO2: 95% (30 Dec 2023 18:15) (91% - 99%)    O2 Parameters below as of 30 Dec 2023 12:00  Patient On (Oxygen Delivery Method): nasal cannula  O2 Flow (L/min): 5                PHYSICAL EXAM:  GENERAL: NAD, comfortable.    HEAD:  Atraumatic, Normocephalic  EYES: EOMI, PERRLA, conjunctiva and sclera clear  NECK:  No JVD.    CHEST/LUNG: Clear to auscultation bilaterally; No wheeze +chest tubes  HEART: Regular rate and rhythm; No murmurs, rubs, or gallops  ABDOMEN: Soft, Nontender, Nondistended; Bowel sounds present  EXTREMITIES:  2+ Peripheral Pulses, No clubbing, cyanosis, or edema  NEUROLOGY: a0x3          Consultant(s) Notes Reviewed:  [x ] YES  [ ] NO  Care Discussed with Consultants/Other Providers [ x] YES  [ ] NO    MEDS:   MEDICATIONS  (STANDING):  acetaminophen     Tablet .. 650 milliGRAM(s) Oral every 6 hours  budesonide  80 MICROgram(s)/formoterol 4.5 MICROgram(s) Inhaler 1 Puff(s) Inhalation two times a day  buMETAnide Injectable 1 milliGRAM(s) IV Push every 12 hours  chlorhexidine 4% Liquid 1 Application(s) Topical <User Schedule>  dextrose 50% Injectable 50 milliLiter(s) IV Push every 15 minutes  heparin   Injectable 5000 Unit(s) SubCutaneous every 8 hours  hydrALAZINE 100 milliGRAM(s) Oral every 8 hours  insulin regular Infusion 3 Unit(s)/Hr (3 mL/Hr) IV Continuous <Continuous>  lidocaine   4% Patch 3 Patch Transdermal daily  magnesium citrate Oral Solution 296 milliLiter(s) Oral once  methocarbamol 500 milliGRAM(s) Oral every 8 hours  methylPREDNISolone sodium succinate Injectable   IV Push   methylPREDNISolone sodium succinate Injectable 24 milliGRAM(s) IV Push every 12 hours  metoclopramide Injectable 10 milliGRAM(s) IV Push every 8 hours  mycophenolate mofetil 1000 milliGRAM(s) Oral every 12 hours  naloxegol 25 milliGRAM(s) Oral daily  niCARdipine Infusion 3 mG/Hr (15 mL/Hr) IV Continuous <Continuous>  NIFEdipine XL 60 milliGRAM(s) Oral every 12 hours  nystatin    Suspension 094412 Unit(s) Oral <User Schedule>  pantoprazole  Injectable 40 milliGRAM(s) IV Push daily  polyethylene glycol 3350 17 Gram(s) Oral daily  pregabalin 50 milliGRAM(s) Oral every 8 hours  senna 2 Tablet(s) Oral at bedtime  simethicone 160 milliGRAM(s) Chew every 8 hours  sodium chloride 0.9%. 1000 milliLiter(s) (10 mL/Hr) IV Continuous <Continuous>  tacrolimus 4 milliGRAM(s) Oral <User Schedule>  traMADol 25 milliGRAM(s) Oral every 8 hours    MEDICATIONS  (PRN):        Allergies    penicillins (Unknown)    Intolerances        LABS:                                                          9.0    8.49  )-----------( 175      ( 30 Dec 2023 00:27 )             27.1   12-30    135  |  97  |  32<H>  ----------------------------<  174<H>  3.9   |  27  |  0.78    Ca    8.8      30 Dec 2023 00:27  Phos  1.9     12-30  Mg     2.1     12-30    TPro  6.1  /  Alb  3.9  /  TBili  0.3  /  DBili  x   /  AST  22  /  ALT  42  /  AlkPhos  45  12-30      < from: CT Abdomen and Pelvis No Cont (11.28.23 @ 03:38) >  IMPRESSION:  Mildly dilated colon and prominent but not overly dilated small bowel   with air-fluid levels. No discrete transition point. Findings are   suggestive of ileus.    Intra-aortic balloon pump with the inferior marker at the level of the   inferior mesenteric artery and the balloon overlying the origins of the   renal arteries, celiac artery, and superior mesenteric artery. Consider   repositioning. Concern for IABP positioning was discussed with LANETTE Hawthorne   on 11/28/2023 at 3:42 AM by Dr. Shepard.    < end of copied text >          < from: Colonoscopy (11.17.23 @ 10:35) >                                                                                                        Impression:          - The entire examined colon is normal on direct and retroflexion views.                       - No specimens collected.  Recommendation:      - Resume previous diet today.                       - No large polyps or masses detected, No objection from GI standpoint to                 proceed with heart transplantation/advanced heart therapies.                       - Please call back should any further questions or concerns arise.    < end of copied text >     < from: Xray Chest 1 View- PORTABLE-Urgent (11.01.23 @ 07:42) >    IMPRESSION:  Clear lungs.    ---End of Report ---        < end of copied text >  < from: TTE W or WO Ultrasound Enhancing Agent (11.01.23 @ 10:23) >  _____________________________     CONCLUSIONS:      1. Left ventricular cavity is moderately dilated. Left ventricular wall thickness is normal. Left ventricular systolic function is severely decreased with an ejection fraction of 32 % by Chinchilla's method of disks. Regional wall motion abnormalities present.   2. Multiple segmental abnormalities exist. See findings.   3. There is moderate (grade 2) left ventricular diastolic dysfunction, with indeterminant filling pressure.   4. Normal right ventricular cavity size, wall thickness, and systolic function.   5. No significant valvular disease.   6. No pericardial effusion seen.   7. Compared to the transthoracic echocardiogram performed on 1/25/2017 the areas of akinesis are unchanged but there has been a decline in LV systolic function with new areas of hypokinesis.    __________________________________________________________________    < end of copied text >  < from: TTE Limited W or WO Ultrasound Enhancing Agent (11.02.23 @ 07:41) >  __________________________     CONCLUSIONS:      1. After obtaining consent, Definity ultrasound enhancing agent was given for enhanced left ventricular opacification and improved delineation of the left ventricular endocardial borders. Left ventricular systolic function is severely decreased with a calculated ejection fraction of 22 % by the Chinchilla's biplane method of disks. There is a left ventricular thrombus.   2. Findings were discussed with Litzy BOSS on 11/2/2023 at 8.49am.   3. There is a left ventricular thrombus.    _________________________________________________________________    < end of copied text >   LD VALENCIA  58y Male  MRN:[id number]    Patient is a 58y old  Male who presents with a chief complaint of NSTEMI (01 Nov 2023 20:29)    HPI:  59yo M w/ hx HTN, CAD w/ 1 stent in 2009, ICH (2008) presenting with abn ekg. Patient presented to [facility] where he was found to have STEMI, recommended to get cath however patient did not want to get it there so it left and came here.  Patient initially had cough, congestion, fever, was placed on antibiotics on Sunday.  Started feeling nauseous and had a presyncopal event after which he presented to ED last night.  Had chest pain as well.  Chest pain is midsternal.  Not currently having chest pain.  Received 4 aspirin 30 min pta. (01 Nov 2023 15:11)      Patient seen and evaluated at bedside in CTU. interval events noted    Interval HPI:   no acute events o/n     PAST MEDICAL & SURGICAL HISTORY:  HTN (hypertension)      CAD (coronary artery disease)  2009; stent      Intracranial hemorrhage  2008      Respiratory arrest  december 1st      Myocardial infarction, unspecified MI type, unspecified artery      History of coronary artery stent placement          REVIEW OF SYSTEMS:  as per hpi     VITALS:   ICU Vital Signs Last 24 Hrs  T(C): 36.1 (30 Dec 2023 16:00), Max: 36.8 (30 Dec 2023 00:00)  T(F): 97 (30 Dec 2023 16:00), Max: 98.2 (30 Dec 2023 00:00)  HR: 97 (30 Dec 2023 18:15) (84 - 104)  BP: 136/68 (30 Dec 2023 07:00) (105/75 - 150/67)  BP(mean): 95 (30 Dec 2023 07:00) (79 - 96)  ABP: 149/63 (30 Dec 2023 18:15) (124/49 - 165/60)  ABP(mean): 86 (30 Dec 2023 18:15) (69 - 93)  RR: 28 (30 Dec 2023 18:15) (13 - 43)  SpO2: 95% (30 Dec 2023 18:15) (91% - 99%)    O2 Parameters below as of 30 Dec 2023 12:00  Patient On (Oxygen Delivery Method): nasal cannula  O2 Flow (L/min): 5                PHYSICAL EXAM:  GENERAL: NAD, comfortable.    HEAD:  Atraumatic, Normocephalic  EYES: EOMI, PERRLA, conjunctiva and sclera clear  NECK:  No JVD.    CHEST/LUNG: Clear to auscultation bilaterally; No wheeze +chest tubes  HEART: Regular rate and rhythm; No murmurs, rubs, or gallops  ABDOMEN: Soft, Nontender, Nondistended; Bowel sounds present  EXTREMITIES:  2+ Peripheral Pulses, No clubbing, cyanosis, or edema  NEUROLOGY: a0x3          Consultant(s) Notes Reviewed:  [x ] YES  [ ] NO  Care Discussed with Consultants/Other Providers [ x] YES  [ ] NO    MEDS:   MEDICATIONS  (STANDING):  acetaminophen     Tablet .. 650 milliGRAM(s) Oral every 6 hours  budesonide  80 MICROgram(s)/formoterol 4.5 MICROgram(s) Inhaler 1 Puff(s) Inhalation two times a day  buMETAnide Injectable 1 milliGRAM(s) IV Push every 12 hours  chlorhexidine 4% Liquid 1 Application(s) Topical <User Schedule>  dextrose 50% Injectable 50 milliLiter(s) IV Push every 15 minutes  heparin   Injectable 5000 Unit(s) SubCutaneous every 8 hours  hydrALAZINE 100 milliGRAM(s) Oral every 8 hours  insulin regular Infusion 3 Unit(s)/Hr (3 mL/Hr) IV Continuous <Continuous>  lidocaine   4% Patch 3 Patch Transdermal daily  magnesium citrate Oral Solution 296 milliLiter(s) Oral once  methocarbamol 500 milliGRAM(s) Oral every 8 hours  methylPREDNISolone sodium succinate Injectable   IV Push   methylPREDNISolone sodium succinate Injectable 24 milliGRAM(s) IV Push every 12 hours  metoclopramide Injectable 10 milliGRAM(s) IV Push every 8 hours  mycophenolate mofetil 1000 milliGRAM(s) Oral every 12 hours  naloxegol 25 milliGRAM(s) Oral daily  niCARdipine Infusion 3 mG/Hr (15 mL/Hr) IV Continuous <Continuous>  NIFEdipine XL 60 milliGRAM(s) Oral every 12 hours  nystatin    Suspension 273828 Unit(s) Oral <User Schedule>  pantoprazole  Injectable 40 milliGRAM(s) IV Push daily  polyethylene glycol 3350 17 Gram(s) Oral daily  pregabalin 50 milliGRAM(s) Oral every 8 hours  senna 2 Tablet(s) Oral at bedtime  simethicone 160 milliGRAM(s) Chew every 8 hours  sodium chloride 0.9%. 1000 milliLiter(s) (10 mL/Hr) IV Continuous <Continuous>  tacrolimus 4 milliGRAM(s) Oral <User Schedule>  traMADol 25 milliGRAM(s) Oral every 8 hours    MEDICATIONS  (PRN):        Allergies    penicillins (Unknown)    Intolerances        LABS:                                                          9.0    8.49  )-----------( 175      ( 30 Dec 2023 00:27 )             27.1   12-30    135  |  97  |  32<H>  ----------------------------<  174<H>  3.9   |  27  |  0.78    Ca    8.8      30 Dec 2023 00:27  Phos  1.9     12-30  Mg     2.1     12-30    TPro  6.1  /  Alb  3.9  /  TBili  0.3  /  DBili  x   /  AST  22  /  ALT  42  /  AlkPhos  45  12-30      < from: CT Abdomen and Pelvis No Cont (11.28.23 @ 03:38) >  IMPRESSION:  Mildly dilated colon and prominent but not overly dilated small bowel   with air-fluid levels. No discrete transition point. Findings are   suggestive of ileus.    Intra-aortic balloon pump with the inferior marker at the level of the   inferior mesenteric artery and the balloon overlying the origins of the   renal arteries, celiac artery, and superior mesenteric artery. Consider   repositioning. Concern for IABP positioning was discussed with LANETTE Hawthorne   on 11/28/2023 at 3:42 AM by Dr. Shepard.    < end of copied text >          < from: Colonoscopy (11.17.23 @ 10:35) >                                                                                                        Impression:          - The entire examined colon is normal on direct and retroflexion views.                       - No specimens collected.  Recommendation:      - Resume previous diet today.                       - No large polyps or masses detected, No objection from GI standpoint to                 proceed with heart transplantation/advanced heart therapies.                       - Please call back should any further questions or concerns arise.    < end of copied text >     < from: Xray Chest 1 View- PORTABLE-Urgent (11.01.23 @ 07:42) >    IMPRESSION:  Clear lungs.    ---End of Report ---        < end of copied text >  < from: TTE W or WO Ultrasound Enhancing Agent (11.01.23 @ 10:23) >  _____________________________     CONCLUSIONS:      1. Left ventricular cavity is moderately dilated. Left ventricular wall thickness is normal. Left ventricular systolic function is severely decreased with an ejection fraction of 32 % by Chinchilla's method of disks. Regional wall motion abnormalities present.   2. Multiple segmental abnormalities exist. See findings.   3. There is moderate (grade 2) left ventricular diastolic dysfunction, with indeterminant filling pressure.   4. Normal right ventricular cavity size, wall thickness, and systolic function.   5. No significant valvular disease.   6. No pericardial effusion seen.   7. Compared to the transthoracic echocardiogram performed on 1/25/2017 the areas of akinesis are unchanged but there has been a decline in LV systolic function with new areas of hypokinesis.    __________________________________________________________________    < end of copied text >  < from: TTE Limited W or WO Ultrasound Enhancing Agent (11.02.23 @ 07:41) >  __________________________     CONCLUSIONS:      1. After obtaining consent, Definity ultrasound enhancing agent was given for enhanced left ventricular opacification and improved delineation of the left ventricular endocardial borders. Left ventricular systolic function is severely decreased with a calculated ejection fraction of 22 % by the Chinchilla's biplane method of disks. There is a left ventricular thrombus.   2. Findings were discussed with Litzy BOSS on 11/2/2023 at 8.49am.   3. There is a left ventricular thrombus.    _________________________________________________________________    < end of copied text >

## 2023-12-30 NOTE — PROGRESS NOTE ADULT - PROBLEM SELECTOR PLAN 1
- s/p OHT on 12/25. Ischemic Time: 253min  - IABP removed 12/26  - Sabrina and Epi weaned off 12/26  - Milrinone off. Remove central line today  - Currently on Cardene gtt; wean as tolerates   - Stop HZN 75 mg TID and transition to Procardia XL 60 mg QD  - Target net negative 1-2L balance  - 1st RHC/EMBx on Tuesday 1/2  - Please keep primary Dr. Banuelos [phone] in the loop per family request. - s/p OHT on 12/25. Ischemic Time: 253min  - IABP removed 12/26  - Sabrina and Epi weaned off 12/26  - Milrinone off. Remove central line today  - Currently on Cardene gtt; wean as tolerates   - Stop HZN 75 mg TID and transition to Procardia XL 60 mg QD  - Target net negative 1-2L balance  - 1st RHC/EMBx on Tuesday 1/2  - Please keep primary Dr. Banuelos [phone] in the loop per family request. - s/p OHT on 12/25. Ischemic Time: 253min  - IABP removed 12/26  - Sabrina and Epi weaned off 12/26  - Milrinone off. Remove central line today  - Currently on Cardene gtt; wean as tolerates   - Increase Procardia XL 60 mg to BID  - Target net negative 1-2L balance. Possibly transition to PO Bumex tomorrow   - 1st RHC/EMBx on Tuesday 1/2  - Please keep primary Dr. Banuelos [phone] in the loop per family request. - s/p OHT on 12/25. Ischemic Time: 253min  - IABP removed 12/26  - Sabrina and Epi weaned off 12/26  - Milrinone off. Remove central line today  - Currently on Cardene gtt; wean as tolerates   - Increase Procardia XL 60 mg to BID  - Target net negative 1-2L balance. Possibly transition to PO Bumex tomorrow   - 1st RHC/EMBx on Tuesday 1/2  - Please keep primary Dr. Banuelos [phone] in the loop per family request.

## 2023-12-31 DIAGNOSIS — Z94.1 HEART TRANSPLANT STATUS: ICD-10-CM

## 2023-12-31 DIAGNOSIS — E11.65 TYPE 2 DIABETES MELLITUS WITH HYPERGLYCEMIA: ICD-10-CM

## 2023-12-31 LAB
ALBUMIN SERPL ELPH-MCNC: 3.9 G/DL — SIGNIFICANT CHANGE UP (ref 3.3–5)
ALBUMIN SERPL ELPH-MCNC: 3.9 G/DL — SIGNIFICANT CHANGE UP (ref 3.3–5)
ALP SERPL-CCNC: 45 U/L — SIGNIFICANT CHANGE UP (ref 40–120)
ALP SERPL-CCNC: 45 U/L — SIGNIFICANT CHANGE UP (ref 40–120)
ALT FLD-CCNC: 54 U/L — HIGH (ref 10–45)
ALT FLD-CCNC: 54 U/L — HIGH (ref 10–45)
ANION GAP SERPL CALC-SCNC: 10 MMOL/L — SIGNIFICANT CHANGE UP (ref 5–17)
ANION GAP SERPL CALC-SCNC: 10 MMOL/L — SIGNIFICANT CHANGE UP (ref 5–17)
AST SERPL-CCNC: 25 U/L — SIGNIFICANT CHANGE UP (ref 10–40)
AST SERPL-CCNC: 25 U/L — SIGNIFICANT CHANGE UP (ref 10–40)
BASOPHILS # BLD AUTO: 0.01 K/UL — SIGNIFICANT CHANGE UP (ref 0–0.2)
BASOPHILS # BLD AUTO: 0.01 K/UL — SIGNIFICANT CHANGE UP (ref 0–0.2)
BASOPHILS NFR BLD AUTO: 0.1 % — SIGNIFICANT CHANGE UP (ref 0–2)
BASOPHILS NFR BLD AUTO: 0.1 % — SIGNIFICANT CHANGE UP (ref 0–2)
BILIRUB SERPL-MCNC: 0.4 MG/DL — SIGNIFICANT CHANGE UP (ref 0.2–1.2)
BILIRUB SERPL-MCNC: 0.4 MG/DL — SIGNIFICANT CHANGE UP (ref 0.2–1.2)
BUN SERPL-MCNC: 32 MG/DL — HIGH (ref 7–23)
BUN SERPL-MCNC: 32 MG/DL — HIGH (ref 7–23)
CALCIUM SERPL-MCNC: 9 MG/DL — SIGNIFICANT CHANGE UP (ref 8.4–10.5)
CALCIUM SERPL-MCNC: 9 MG/DL — SIGNIFICANT CHANGE UP (ref 8.4–10.5)
CHLORIDE SERPL-SCNC: 96 MMOL/L — SIGNIFICANT CHANGE UP (ref 96–108)
CHLORIDE SERPL-SCNC: 96 MMOL/L — SIGNIFICANT CHANGE UP (ref 96–108)
CO2 SERPL-SCNC: 27 MMOL/L — SIGNIFICANT CHANGE UP (ref 22–31)
CO2 SERPL-SCNC: 27 MMOL/L — SIGNIFICANT CHANGE UP (ref 22–31)
CREAT SERPL-MCNC: 0.83 MG/DL — SIGNIFICANT CHANGE UP (ref 0.5–1.3)
CREAT SERPL-MCNC: 0.83 MG/DL — SIGNIFICANT CHANGE UP (ref 0.5–1.3)
EGFR: 101 ML/MIN/1.73M2 — SIGNIFICANT CHANGE UP
EGFR: 101 ML/MIN/1.73M2 — SIGNIFICANT CHANGE UP
EOSINOPHIL # BLD AUTO: 0 K/UL — SIGNIFICANT CHANGE UP (ref 0–0.5)
EOSINOPHIL # BLD AUTO: 0 K/UL — SIGNIFICANT CHANGE UP (ref 0–0.5)
EOSINOPHIL NFR BLD AUTO: 0 % — SIGNIFICANT CHANGE UP (ref 0–6)
EOSINOPHIL NFR BLD AUTO: 0 % — SIGNIFICANT CHANGE UP (ref 0–6)
GAS PNL BLDA: SIGNIFICANT CHANGE UP
GAS PNL BLDA: SIGNIFICANT CHANGE UP
GLUCOSE BLDC GLUCOMTR-MCNC: 103 MG/DL — HIGH (ref 70–99)
GLUCOSE BLDC GLUCOMTR-MCNC: 103 MG/DL — HIGH (ref 70–99)
GLUCOSE BLDC GLUCOMTR-MCNC: 104 MG/DL — HIGH (ref 70–99)
GLUCOSE BLDC GLUCOMTR-MCNC: 107 MG/DL — HIGH (ref 70–99)
GLUCOSE BLDC GLUCOMTR-MCNC: 107 MG/DL — HIGH (ref 70–99)
GLUCOSE BLDC GLUCOMTR-MCNC: 110 MG/DL — HIGH (ref 70–99)
GLUCOSE BLDC GLUCOMTR-MCNC: 110 MG/DL — HIGH (ref 70–99)
GLUCOSE BLDC GLUCOMTR-MCNC: 114 MG/DL — HIGH (ref 70–99)
GLUCOSE BLDC GLUCOMTR-MCNC: 114 MG/DL — HIGH (ref 70–99)
GLUCOSE BLDC GLUCOMTR-MCNC: 119 MG/DL — HIGH (ref 70–99)
GLUCOSE BLDC GLUCOMTR-MCNC: 119 MG/DL — HIGH (ref 70–99)
GLUCOSE BLDC GLUCOMTR-MCNC: 129 MG/DL — HIGH (ref 70–99)
GLUCOSE BLDC GLUCOMTR-MCNC: 129 MG/DL — HIGH (ref 70–99)
GLUCOSE BLDC GLUCOMTR-MCNC: 130 MG/DL — HIGH (ref 70–99)
GLUCOSE BLDC GLUCOMTR-MCNC: 130 MG/DL — HIGH (ref 70–99)
GLUCOSE BLDC GLUCOMTR-MCNC: 137 MG/DL — HIGH (ref 70–99)
GLUCOSE BLDC GLUCOMTR-MCNC: 137 MG/DL — HIGH (ref 70–99)
GLUCOSE BLDC GLUCOMTR-MCNC: 146 MG/DL — HIGH (ref 70–99)
GLUCOSE BLDC GLUCOMTR-MCNC: 146 MG/DL — HIGH (ref 70–99)
GLUCOSE BLDC GLUCOMTR-MCNC: 158 MG/DL — HIGH (ref 70–99)
GLUCOSE BLDC GLUCOMTR-MCNC: 158 MG/DL — HIGH (ref 70–99)
GLUCOSE BLDC GLUCOMTR-MCNC: 160 MG/DL — HIGH (ref 70–99)
GLUCOSE BLDC GLUCOMTR-MCNC: 162 MG/DL — HIGH (ref 70–99)
GLUCOSE BLDC GLUCOMTR-MCNC: 162 MG/DL — HIGH (ref 70–99)
GLUCOSE BLDC GLUCOMTR-MCNC: 190 MG/DL — HIGH (ref 70–99)
GLUCOSE BLDC GLUCOMTR-MCNC: 190 MG/DL — HIGH (ref 70–99)
GLUCOSE BLDC GLUCOMTR-MCNC: 201 MG/DL — HIGH (ref 70–99)
GLUCOSE BLDC GLUCOMTR-MCNC: 201 MG/DL — HIGH (ref 70–99)
GLUCOSE BLDC GLUCOMTR-MCNC: 202 MG/DL — HIGH (ref 70–99)
GLUCOSE BLDC GLUCOMTR-MCNC: 202 MG/DL — HIGH (ref 70–99)
GLUCOSE BLDC GLUCOMTR-MCNC: 203 MG/DL — HIGH (ref 70–99)
GLUCOSE BLDC GLUCOMTR-MCNC: 203 MG/DL — HIGH (ref 70–99)
GLUCOSE BLDC GLUCOMTR-MCNC: 249 MG/DL — HIGH (ref 70–99)
GLUCOSE BLDC GLUCOMTR-MCNC: 249 MG/DL — HIGH (ref 70–99)
GLUCOSE BLDC GLUCOMTR-MCNC: 67 MG/DL — LOW (ref 70–99)
GLUCOSE BLDC GLUCOMTR-MCNC: 67 MG/DL — LOW (ref 70–99)
GLUCOSE BLDC GLUCOMTR-MCNC: 84 MG/DL — SIGNIFICANT CHANGE UP (ref 70–99)
GLUCOSE BLDC GLUCOMTR-MCNC: 84 MG/DL — SIGNIFICANT CHANGE UP (ref 70–99)
GLUCOSE SERPL-MCNC: 123 MG/DL — HIGH (ref 70–99)
GLUCOSE SERPL-MCNC: 123 MG/DL — HIGH (ref 70–99)
HCT VFR BLD CALC: 26.2 % — LOW (ref 39–50)
HCT VFR BLD CALC: 26.2 % — LOW (ref 39–50)
HGB BLD-MCNC: 9 G/DL — LOW (ref 13–17)
HGB BLD-MCNC: 9 G/DL — LOW (ref 13–17)
IMM GRANULOCYTES NFR BLD AUTO: 0.5 % — SIGNIFICANT CHANGE UP (ref 0–0.9)
IMM GRANULOCYTES NFR BLD AUTO: 0.5 % — SIGNIFICANT CHANGE UP (ref 0–0.9)
LYMPHOCYTES # BLD AUTO: 0.96 K/UL — LOW (ref 1–3.3)
LYMPHOCYTES # BLD AUTO: 0.96 K/UL — LOW (ref 1–3.3)
LYMPHOCYTES # BLD AUTO: 9.7 % — LOW (ref 13–44)
LYMPHOCYTES # BLD AUTO: 9.7 % — LOW (ref 13–44)
MAGNESIUM SERPL-MCNC: 2 MG/DL — SIGNIFICANT CHANGE UP (ref 1.6–2.6)
MAGNESIUM SERPL-MCNC: 2 MG/DL — SIGNIFICANT CHANGE UP (ref 1.6–2.6)
MCHC RBC-ENTMCNC: 30.3 PG — SIGNIFICANT CHANGE UP (ref 27–34)
MCHC RBC-ENTMCNC: 30.3 PG — SIGNIFICANT CHANGE UP (ref 27–34)
MCHC RBC-ENTMCNC: 34.4 GM/DL — SIGNIFICANT CHANGE UP (ref 32–36)
MCHC RBC-ENTMCNC: 34.4 GM/DL — SIGNIFICANT CHANGE UP (ref 32–36)
MCV RBC AUTO: 88.2 FL — SIGNIFICANT CHANGE UP (ref 80–100)
MCV RBC AUTO: 88.2 FL — SIGNIFICANT CHANGE UP (ref 80–100)
MONOCYTES # BLD AUTO: 0.58 K/UL — SIGNIFICANT CHANGE UP (ref 0–0.9)
MONOCYTES # BLD AUTO: 0.58 K/UL — SIGNIFICANT CHANGE UP (ref 0–0.9)
MONOCYTES NFR BLD AUTO: 5.9 % — SIGNIFICANT CHANGE UP (ref 2–14)
MONOCYTES NFR BLD AUTO: 5.9 % — SIGNIFICANT CHANGE UP (ref 2–14)
NEUTROPHILS # BLD AUTO: 8.3 K/UL — HIGH (ref 1.8–7.4)
NEUTROPHILS # BLD AUTO: 8.3 K/UL — HIGH (ref 1.8–7.4)
NEUTROPHILS NFR BLD AUTO: 83.8 % — HIGH (ref 43–77)
NEUTROPHILS NFR BLD AUTO: 83.8 % — HIGH (ref 43–77)
NRBC # BLD: 0 /100 WBCS — SIGNIFICANT CHANGE UP (ref 0–0)
NRBC # BLD: 0 /100 WBCS — SIGNIFICANT CHANGE UP (ref 0–0)
PHOSPHATE SERPL-MCNC: 2.5 MG/DL — SIGNIFICANT CHANGE UP (ref 2.5–4.5)
PHOSPHATE SERPL-MCNC: 2.5 MG/DL — SIGNIFICANT CHANGE UP (ref 2.5–4.5)
PLATELET # BLD AUTO: 189 K/UL — SIGNIFICANT CHANGE UP (ref 150–400)
PLATELET # BLD AUTO: 189 K/UL — SIGNIFICANT CHANGE UP (ref 150–400)
POTASSIUM SERPL-MCNC: 3.9 MMOL/L — SIGNIFICANT CHANGE UP (ref 3.5–5.3)
POTASSIUM SERPL-MCNC: 3.9 MMOL/L — SIGNIFICANT CHANGE UP (ref 3.5–5.3)
POTASSIUM SERPL-SCNC: 3.9 MMOL/L — SIGNIFICANT CHANGE UP (ref 3.5–5.3)
POTASSIUM SERPL-SCNC: 3.9 MMOL/L — SIGNIFICANT CHANGE UP (ref 3.5–5.3)
PROT SERPL-MCNC: 6.1 G/DL — SIGNIFICANT CHANGE UP (ref 6–8.3)
PROT SERPL-MCNC: 6.1 G/DL — SIGNIFICANT CHANGE UP (ref 6–8.3)
RBC # BLD: 2.97 M/UL — LOW (ref 4.2–5.8)
RBC # BLD: 2.97 M/UL — LOW (ref 4.2–5.8)
RBC # FLD: 15.2 % — HIGH (ref 10.3–14.5)
RBC # FLD: 15.2 % — HIGH (ref 10.3–14.5)
SODIUM SERPL-SCNC: 133 MMOL/L — LOW (ref 135–145)
SODIUM SERPL-SCNC: 133 MMOL/L — LOW (ref 135–145)
TACROLIMUS SERPL-MCNC: 8.2 NG/ML — SIGNIFICANT CHANGE UP
TACROLIMUS SERPL-MCNC: 8.2 NG/ML — SIGNIFICANT CHANGE UP
WBC # BLD: 9.9 K/UL — SIGNIFICANT CHANGE UP (ref 3.8–10.5)
WBC # BLD: 9.9 K/UL — SIGNIFICANT CHANGE UP (ref 3.8–10.5)
WBC # FLD AUTO: 9.9 K/UL — SIGNIFICANT CHANGE UP (ref 3.8–10.5)
WBC # FLD AUTO: 9.9 K/UL — SIGNIFICANT CHANGE UP (ref 3.8–10.5)

## 2023-12-31 PROCEDURE — 99291 CRITICAL CARE FIRST HOUR: CPT

## 2023-12-31 PROCEDURE — 71045 X-RAY EXAM CHEST 1 VIEW: CPT | Mod: 26

## 2023-12-31 RX ORDER — MAGNESIUM SULFATE 500 MG/ML
2 VIAL (ML) INJECTION ONCE
Refills: 0 | Status: COMPLETED | OUTPATIENT
Start: 2023-12-31 | End: 2023-12-31

## 2023-12-31 RX ORDER — TACROLIMUS 5 MG/1
6 CAPSULE ORAL
Refills: 0 | Status: DISCONTINUED | OUTPATIENT
Start: 2023-12-31 | End: 2024-01-03

## 2023-12-31 RX ORDER — BUMETANIDE 0.25 MG/ML
1 INJECTION INTRAMUSCULAR; INTRAVENOUS
Refills: 0 | Status: DISCONTINUED | OUTPATIENT
Start: 2023-12-31 | End: 2024-01-04

## 2023-12-31 RX ORDER — MULTIVIT WITH MIN/MFOLATE/K2 340-15/3 G
296 POWDER (GRAM) ORAL ONCE
Refills: 0 | Status: DISCONTINUED | OUTPATIENT
Start: 2023-12-31 | End: 2023-12-31

## 2023-12-31 RX ORDER — POTASSIUM CHLORIDE 20 MEQ
20 PACKET (EA) ORAL
Refills: 0 | Status: COMPLETED | OUTPATIENT
Start: 2023-12-31 | End: 2023-12-31

## 2023-12-31 RX ORDER — MULTIVIT WITH MIN/MFOLATE/K2 340-15/3 G
296 POWDER (GRAM) ORAL DAILY
Refills: 0 | Status: DISCONTINUED | OUTPATIENT
Start: 2023-12-31 | End: 2024-01-02

## 2023-12-31 RX ORDER — BUMETANIDE 0.25 MG/ML
1 INJECTION INTRAMUSCULAR; INTRAVENOUS EVERY 8 HOURS
Refills: 0 | Status: DISCONTINUED | OUTPATIENT
Start: 2023-12-31 | End: 2023-12-31

## 2023-12-31 RX ORDER — CALCIUM GLUCONATE 100 MG/ML
2 VIAL (ML) INTRAVENOUS ONCE
Refills: 0 | Status: COMPLETED | OUTPATIENT
Start: 2023-12-31 | End: 2023-12-31

## 2023-12-31 RX ORDER — VALGANCICLOVIR 450 MG/1
450 TABLET, FILM COATED ORAL EVERY 12 HOURS
Refills: 0 | Status: DISCONTINUED | OUTPATIENT
Start: 2023-12-31 | End: 2024-01-09

## 2023-12-31 RX ADMIN — NALOXEGOL OXALATE 25 MILLIGRAM(S): 12.5 TABLET, FILM COATED ORAL at 11:22

## 2023-12-31 RX ADMIN — BUMETANIDE 1 MILLIGRAM(S): 0.25 INJECTION INTRAMUSCULAR; INTRAVENOUS at 05:01

## 2023-12-31 RX ADMIN — Medication 10 MILLIGRAM(S): at 21:38

## 2023-12-31 RX ADMIN — BUMETANIDE 1 MILLIGRAM(S): 0.25 INJECTION INTRAMUSCULAR; INTRAVENOUS at 17:56

## 2023-12-31 RX ADMIN — Medication 20 MILLIGRAM(S): at 00:00

## 2023-12-31 RX ADMIN — Medication 50 MILLIGRAM(S): at 21:37

## 2023-12-31 RX ADMIN — HEPARIN SODIUM 5000 UNIT(S): 5000 INJECTION INTRAVENOUS; SUBCUTANEOUS at 13:17

## 2023-12-31 RX ADMIN — BUDESONIDE AND FORMOTEROL FUMARATE DIHYDRATE 1 PUFF(S): 160; 4.5 AEROSOL RESPIRATORY (INHALATION) at 17:58

## 2023-12-31 RX ADMIN — MYCOPHENOLATE MOFETIL 1000 MILLIGRAM(S): 250 CAPSULE ORAL at 20:06

## 2023-12-31 RX ADMIN — Medication 650 MILLIGRAM(S): at 12:00

## 2023-12-31 RX ADMIN — Medication 100 MILLIGRAM(S): at 21:35

## 2023-12-31 RX ADMIN — METHOCARBAMOL 500 MILLIGRAM(S): 500 TABLET, FILM COATED ORAL at 13:16

## 2023-12-31 RX ADMIN — Medication 500000 UNIT(S): at 08:19

## 2023-12-31 RX ADMIN — Medication 650 MILLIGRAM(S): at 22:59

## 2023-12-31 RX ADMIN — Medication 50 MILLIGRAM(S): at 05:05

## 2023-12-31 RX ADMIN — TRAMADOL HYDROCHLORIDE 25 MILLIGRAM(S): 50 TABLET ORAL at 05:06

## 2023-12-31 RX ADMIN — TRAMADOL HYDROCHLORIDE 25 MILLIGRAM(S): 50 TABLET ORAL at 21:38

## 2023-12-31 RX ADMIN — SENNA PLUS 2 TABLET(S): 8.6 TABLET ORAL at 21:35

## 2023-12-31 RX ADMIN — Medication 650 MILLIGRAM(S): at 17:57

## 2023-12-31 RX ADMIN — Medication 25 GRAM(S): at 02:09

## 2023-12-31 RX ADMIN — Medication 50 MILLIGRAM(S): at 13:16

## 2023-12-31 RX ADMIN — Medication 60 MILLIGRAM(S): at 17:57

## 2023-12-31 RX ADMIN — TACROLIMUS 4 MILLIGRAM(S): 5 CAPSULE ORAL at 08:20

## 2023-12-31 RX ADMIN — INSULIN HUMAN 3 UNIT(S)/HR: 100 INJECTION, SOLUTION SUBCUTANEOUS at 22:02

## 2023-12-31 RX ADMIN — TRAMADOL HYDROCHLORIDE 25 MILLIGRAM(S): 50 TABLET ORAL at 14:02

## 2023-12-31 RX ADMIN — Medication 20 MILLIEQUIVALENT(S): at 04:01

## 2023-12-31 RX ADMIN — METHOCARBAMOL 500 MILLIGRAM(S): 500 TABLET, FILM COATED ORAL at 21:37

## 2023-12-31 RX ADMIN — TRAMADOL HYDROCHLORIDE 25 MILLIGRAM(S): 50 TABLET ORAL at 22:45

## 2023-12-31 RX ADMIN — Medication 24 MILLIGRAM(S): at 05:04

## 2023-12-31 RX ADMIN — Medication 10 MILLIGRAM(S): at 13:17

## 2023-12-31 RX ADMIN — TRAMADOL HYDROCHLORIDE 25 MILLIGRAM(S): 50 TABLET ORAL at 05:36

## 2023-12-31 RX ADMIN — Medication 650 MILLIGRAM(S): at 05:36

## 2023-12-31 RX ADMIN — Medication 100 MILLIGRAM(S): at 13:16

## 2023-12-31 RX ADMIN — Medication 1 TABLET(S): at 17:57

## 2023-12-31 RX ADMIN — Medication 200 GRAM(S): at 02:10

## 2023-12-31 RX ADMIN — Medication 20 MILLIGRAM(S): at 17:56

## 2023-12-31 RX ADMIN — Medication 500000 UNIT(S): at 17:56

## 2023-12-31 RX ADMIN — METHOCARBAMOL 500 MILLIGRAM(S): 500 TABLET, FILM COATED ORAL at 05:06

## 2023-12-31 RX ADMIN — Medication 650 MILLIGRAM(S): at 05:06

## 2023-12-31 RX ADMIN — HEPARIN SODIUM 5000 UNIT(S): 5000 INJECTION INTRAVENOUS; SUBCUTANEOUS at 05:03

## 2023-12-31 RX ADMIN — BUDESONIDE AND FORMOTEROL FUMARATE DIHYDRATE 1 PUFF(S): 160; 4.5 AEROSOL RESPIRATORY (INHALATION) at 05:25

## 2023-12-31 RX ADMIN — TRAMADOL HYDROCHLORIDE 25 MILLIGRAM(S): 50 TABLET ORAL at 13:16

## 2023-12-31 RX ADMIN — Medication 20 MILLIEQUIVALENT(S): at 02:09

## 2023-12-31 RX ADMIN — PANTOPRAZOLE SODIUM 40 MILLIGRAM(S): 20 TABLET, DELAYED RELEASE ORAL at 11:22

## 2023-12-31 RX ADMIN — SIMETHICONE 160 MILLIGRAM(S): 80 TABLET, CHEWABLE ORAL at 05:06

## 2023-12-31 RX ADMIN — BUMETANIDE 1 MILLIGRAM(S): 0.25 INJECTION INTRAMUSCULAR; INTRAVENOUS at 13:15

## 2023-12-31 RX ADMIN — Medication 500000 UNIT(S): at 11:22

## 2023-12-31 RX ADMIN — INSULIN HUMAN 3 UNIT(S)/HR: 100 INJECTION, SOLUTION SUBCUTANEOUS at 07:18

## 2023-12-31 RX ADMIN — VALGANCICLOVIR 450 MILLIGRAM(S): 450 TABLET, FILM COATED ORAL at 20:05

## 2023-12-31 RX ADMIN — MYCOPHENOLATE MOFETIL 1000 MILLIGRAM(S): 250 CAPSULE ORAL at 08:20

## 2023-12-31 RX ADMIN — POLYETHYLENE GLYCOL 3350 17 GRAM(S): 17 POWDER, FOR SOLUTION ORAL at 11:22

## 2023-12-31 RX ADMIN — Medication 500000 UNIT(S): at 20:05

## 2023-12-31 RX ADMIN — SIMETHICONE 160 MILLIGRAM(S): 80 TABLET, CHEWABLE ORAL at 21:36

## 2023-12-31 RX ADMIN — Medication 100 MILLIGRAM(S): at 05:05

## 2023-12-31 RX ADMIN — Medication 296 MILLILITER(S): at 13:48

## 2023-12-31 RX ADMIN — Medication 10 MILLIGRAM(S): at 05:07

## 2023-12-31 RX ADMIN — Medication 650 MILLIGRAM(S): at 11:21

## 2023-12-31 RX ADMIN — TACROLIMUS 6 MILLIGRAM(S): 5 CAPSULE ORAL at 20:05

## 2023-12-31 RX ADMIN — Medication 60 MILLIGRAM(S): at 05:05

## 2023-12-31 RX ADMIN — HEPARIN SODIUM 5000 UNIT(S): 5000 INJECTION INTRAVENOUS; SUBCUTANEOUS at 21:38

## 2023-12-31 NOTE — PROGRESS NOTE ADULT - CRITICAL CARE SERVICES
36
30
35
75
35
60
60
75
30
30
35
60
75
75
30
75
30
75
31
75
75
31
75
45
60
75
36
45
75
60
75
40
40
45
75
75
45
34
45
75
30
35
60
35
35
40
75
35
75
35
35
45
40
40
38
36
50
35
50
36
35
36
35
50
36
40
75
40
50

## 2023-12-31 NOTE — PROGRESS NOTE ADULT - SUBJECTIVE AND OBJECTIVE BOX
Subjective:  No acute events.     Medications:  acetaminophen     Tablet .. 650 milliGRAM(s) Oral every 6 hours  budesonide  80 MICROgram(s)/formoterol 4.5 MICROgram(s) Inhaler 1 Puff(s) Inhalation two times a day  buMETAnide Injectable 1 milliGRAM(s) IV Push every 12 hours  chlorhexidine 4% Liquid 1 Application(s) Topical <User Schedule>  dextrose 50% Injectable 50 milliLiter(s) IV Push every 15 minutes  heparin   Injectable 5000 Unit(s) SubCutaneous every 8 hours  hydrALAZINE 100 milliGRAM(s) Oral every 8 hours  insulin regular Infusion 3 Unit(s)/Hr IV Continuous <Continuous>  lidocaine   4% Patch 3 Patch Transdermal daily  methocarbamol 500 milliGRAM(s) Oral every 8 hours  methylPREDNISolone sodium succinate Injectable   IV Push   methylPREDNISolone sodium succinate Injectable 20 milliGRAM(s) IV Push every 12 hours  metoclopramide Injectable 10 milliGRAM(s) IV Push every 8 hours  mycophenolate mofetil 1000 milliGRAM(s) Oral every 12 hours  naloxegol 25 milliGRAM(s) Oral daily  NIFEdipine XL 60 milliGRAM(s) Oral every 12 hours  nystatin    Suspension 957884 Unit(s) Oral <User Schedule>  pantoprazole  Injectable 40 milliGRAM(s) IV Push daily  polyethylene glycol 3350 17 Gram(s) Oral daily  pregabalin 50 milliGRAM(s) Oral every 8 hours  senna 2 Tablet(s) Oral at bedtime  simethicone 160 milliGRAM(s) Chew every 8 hours  tacrolimus 4 milliGRAM(s) Oral <User Schedule>  traMADol 25 milliGRAM(s) Oral every 8 hours    Vitals:  T(C): 36.3 (12-31-23 @ 08:00), Max: 37.4 (12-30-23 @ 20:00)  HR: 97 (12-31-23 @ 08:00) (88 - 104)  ABP: 125/59 (12-31-23 @ 08:00) (111/55 - 164/66)  ABP(mean): 79 (12-31-23 @ 08:00) (68 - 92)  RR: 21 (12-31-23 @ 08:00) (13 - 43)  SpO2: 98% (12-31-23 @ 08:00) (92% - 99%)      I&O's Summary    30 Dec 2023 07:01  -  31 Dec 2023 07:00  --------------------------------------------------------  IN: 3068.5 mL / OUT: 1685 mL / NET: 1383.5 mL    31 Dec 2023 07:01  -  31 Dec 2023 08:42  --------------------------------------------------------  IN: 184 mL / OUT: 640 mL / NET: -456 mL        Physical Exam:  General: No distress. Comfortable in chair  HEENT: EOM intact.  Neck: Neck supple. JVP 8 cm H2O  Chest: Clear to auscultation bilaterally  CV: Normal S1 and S2. No murmurs, rub, or gallops. Radial pulses normal.  Abdomen: Soft, non-distended, non-tender  Extremities: Warm peripherally, no edema   Neurology: Alert and oriented times three. Sensation intact  Psych: Affect normal        Labs:                        9.0    9.90  )-----------( 189      ( 31 Dec 2023 00:26 )             26.2     12-31    133<L>  |  96  |  32<H>  ----------------------------<  123<H>  3.9   |  27  |  0.83    Ca    9.0      31 Dec 2023 00:29  Phos  2.5     12-31  Mg     2.0     12-31    TPro  6.1  /  Alb  3.9  /  TBili  0.4  /  DBili  x   /  AST  25  /  ALT  54<H>  /  AlkPhos  45  12-31        Tacrolimus (), Serum: 8.1 ng/mL (12-30-23 @ 06:45)  Tacrolimus (), Serum: 10.7 ng/mL (12-29-23 @ 06:25)  Tacrolimus (), Serum: 2.6 ng/mL (12-28-23 @ 07:20)  Tacrolimus (), Serum: <0.8 ng/mL (12-27-23 @ 06:44)       Subjective:  No acute events.     Medications:  acetaminophen     Tablet .. 650 milliGRAM(s) Oral every 6 hours  budesonide  80 MICROgram(s)/formoterol 4.5 MICROgram(s) Inhaler 1 Puff(s) Inhalation two times a day  buMETAnide Injectable 1 milliGRAM(s) IV Push every 12 hours  chlorhexidine 4% Liquid 1 Application(s) Topical <User Schedule>  dextrose 50% Injectable 50 milliLiter(s) IV Push every 15 minutes  heparin   Injectable 5000 Unit(s) SubCutaneous every 8 hours  hydrALAZINE 100 milliGRAM(s) Oral every 8 hours  insulin regular Infusion 3 Unit(s)/Hr IV Continuous <Continuous>  lidocaine   4% Patch 3 Patch Transdermal daily  methocarbamol 500 milliGRAM(s) Oral every 8 hours  methylPREDNISolone sodium succinate Injectable   IV Push   methylPREDNISolone sodium succinate Injectable 20 milliGRAM(s) IV Push every 12 hours  metoclopramide Injectable 10 milliGRAM(s) IV Push every 8 hours  mycophenolate mofetil 1000 milliGRAM(s) Oral every 12 hours  naloxegol 25 milliGRAM(s) Oral daily  NIFEdipine XL 60 milliGRAM(s) Oral every 12 hours  nystatin    Suspension 935518 Unit(s) Oral <User Schedule>  pantoprazole  Injectable 40 milliGRAM(s) IV Push daily  polyethylene glycol 3350 17 Gram(s) Oral daily  pregabalin 50 milliGRAM(s) Oral every 8 hours  senna 2 Tablet(s) Oral at bedtime  simethicone 160 milliGRAM(s) Chew every 8 hours  tacrolimus 4 milliGRAM(s) Oral <User Schedule>  traMADol 25 milliGRAM(s) Oral every 8 hours    Vitals:  T(C): 36.3 (12-31-23 @ 08:00), Max: 37.4 (12-30-23 @ 20:00)  HR: 97 (12-31-23 @ 08:00) (88 - 104)  ABP: 125/59 (12-31-23 @ 08:00) (111/55 - 164/66)  ABP(mean): 79 (12-31-23 @ 08:00) (68 - 92)  RR: 21 (12-31-23 @ 08:00) (13 - 43)  SpO2: 98% (12-31-23 @ 08:00) (92% - 99%)      I&O's Summary    30 Dec 2023 07:01  -  31 Dec 2023 07:00  --------------------------------------------------------  IN: 3068.5 mL / OUT: 1685 mL / NET: 1383.5 mL    31 Dec 2023 07:01  -  31 Dec 2023 08:42  --------------------------------------------------------  IN: 184 mL / OUT: 640 mL / NET: -456 mL        Physical Exam:  General: No distress. Comfortable in chair  HEENT: EOM intact.  Neck: Neck supple. JVP 8 cm H2O  Chest: Clear to auscultation bilaterally  CV: Normal S1 and S2. No murmurs, rub, or gallops. Radial pulses normal.  Abdomen: Soft, non-distended, non-tender  Extremities: Warm peripherally, no edema   Neurology: Alert and oriented times three. Sensation intact  Psych: Affect normal        Labs:                        9.0    9.90  )-----------( 189      ( 31 Dec 2023 00:26 )             26.2     12-31    133<L>  |  96  |  32<H>  ----------------------------<  123<H>  3.9   |  27  |  0.83    Ca    9.0      31 Dec 2023 00:29  Phos  2.5     12-31  Mg     2.0     12-31    TPro  6.1  /  Alb  3.9  /  TBili  0.4  /  DBili  x   /  AST  25  /  ALT  54<H>  /  AlkPhos  45  12-31        Tacrolimus (), Serum: 8.1 ng/mL (12-30-23 @ 06:45)  Tacrolimus (), Serum: 10.7 ng/mL (12-29-23 @ 06:25)  Tacrolimus (), Serum: 2.6 ng/mL (12-28-23 @ 07:20)  Tacrolimus (), Serum: <0.8 ng/mL (12-27-23 @ 06:44)       Subjective:  No acute events. Had BM yesterday.     Medications:  acetaminophen     Tablet .. 650 milliGRAM(s) Oral every 6 hours  budesonide  80 MICROgram(s)/formoterol 4.5 MICROgram(s) Inhaler 1 Puff(s) Inhalation two times a day  buMETAnide Injectable 1 milliGRAM(s) IV Push every 12 hours  chlorhexidine 4% Liquid 1 Application(s) Topical <User Schedule>  dextrose 50% Injectable 50 milliLiter(s) IV Push every 15 minutes  heparin   Injectable 5000 Unit(s) SubCutaneous every 8 hours  hydrALAZINE 100 milliGRAM(s) Oral every 8 hours  insulin regular Infusion 3 Unit(s)/Hr IV Continuous <Continuous>  lidocaine   4% Patch 3 Patch Transdermal daily  methocarbamol 500 milliGRAM(s) Oral every 8 hours  methylPREDNISolone sodium succinate Injectable   IV Push   methylPREDNISolone sodium succinate Injectable 20 milliGRAM(s) IV Push every 12 hours  metoclopramide Injectable 10 milliGRAM(s) IV Push every 8 hours  mycophenolate mofetil 1000 milliGRAM(s) Oral every 12 hours  naloxegol 25 milliGRAM(s) Oral daily  NIFEdipine XL 60 milliGRAM(s) Oral every 12 hours  nystatin    Suspension 473083 Unit(s) Oral <User Schedule>  pantoprazole  Injectable 40 milliGRAM(s) IV Push daily  polyethylene glycol 3350 17 Gram(s) Oral daily  pregabalin 50 milliGRAM(s) Oral every 8 hours  senna 2 Tablet(s) Oral at bedtime  simethicone 160 milliGRAM(s) Chew every 8 hours  tacrolimus 4 milliGRAM(s) Oral <User Schedule>  traMADol 25 milliGRAM(s) Oral every 8 hours    Vitals:  T(C): 36.3 (12-31-23 @ 08:00), Max: 37.4 (12-30-23 @ 20:00)  HR: 97 (12-31-23 @ 08:00) (88 - 104)  ABP: 125/59 (12-31-23 @ 08:00) (111/55 - 164/66)  ABP(mean): 79 (12-31-23 @ 08:00) (68 - 92)  RR: 21 (12-31-23 @ 08:00) (13 - 43)  SpO2: 98% (12-31-23 @ 08:00) (92% - 99%)      I&O's Summary    30 Dec 2023 07:01  -  31 Dec 2023 07:00  --------------------------------------------------------  IN: 3068.5 mL / OUT: 1685 mL / NET: 1383.5 mL    31 Dec 2023 07:01  -  31 Dec 2023 08:42  --------------------------------------------------------  IN: 184 mL / OUT: 640 mL / NET: -456 mL        Physical Exam:  General: No distress. Comfortable in chair  HEENT: EOM intact.  Neck: Neck supple. JVP 8-10 cm H2O  Chest: Clear to auscultation bilaterally  CV: Normal S1 and S2. No murmurs, rub, or gallops. Radial pulses normal.  Abdomen: Soft, non-distended, non-tender  Extremities: Warm peripherally, no edema   Neurology: Alert and oriented times three. Sensation intact  Psych: Affect normal        Labs:                        9.0    9.90  )-----------( 189      ( 31 Dec 2023 00:26 )             26.2     12-31    133<L>  |  96  |  32<H>  ----------------------------<  123<H>  3.9   |  27  |  0.83    Ca    9.0      31 Dec 2023 00:29  Phos  2.5     12-31  Mg     2.0     12-31    TPro  6.1  /  Alb  3.9  /  TBili  0.4  /  DBili  x   /  AST  25  /  ALT  54<H>  /  AlkPhos  45  12-31        Tacrolimus (), Serum: 8.1 ng/mL (12-30-23 @ 06:45)  Tacrolimus (), Serum: 10.7 ng/mL (12-29-23 @ 06:25)  Tacrolimus (), Serum: 2.6 ng/mL (12-28-23 @ 07:20)  Tacrolimus (), Serum: <0.8 ng/mL (12-27-23 @ 06:44)       Subjective:  No acute events. Had BM yesterday.     Medications:  acetaminophen     Tablet .. 650 milliGRAM(s) Oral every 6 hours  budesonide  80 MICROgram(s)/formoterol 4.5 MICROgram(s) Inhaler 1 Puff(s) Inhalation two times a day  buMETAnide Injectable 1 milliGRAM(s) IV Push every 12 hours  chlorhexidine 4% Liquid 1 Application(s) Topical <User Schedule>  dextrose 50% Injectable 50 milliLiter(s) IV Push every 15 minutes  heparin   Injectable 5000 Unit(s) SubCutaneous every 8 hours  hydrALAZINE 100 milliGRAM(s) Oral every 8 hours  insulin regular Infusion 3 Unit(s)/Hr IV Continuous <Continuous>  lidocaine   4% Patch 3 Patch Transdermal daily  methocarbamol 500 milliGRAM(s) Oral every 8 hours  methylPREDNISolone sodium succinate Injectable   IV Push   methylPREDNISolone sodium succinate Injectable 20 milliGRAM(s) IV Push every 12 hours  metoclopramide Injectable 10 milliGRAM(s) IV Push every 8 hours  mycophenolate mofetil 1000 milliGRAM(s) Oral every 12 hours  naloxegol 25 milliGRAM(s) Oral daily  NIFEdipine XL 60 milliGRAM(s) Oral every 12 hours  nystatin    Suspension 554664 Unit(s) Oral <User Schedule>  pantoprazole  Injectable 40 milliGRAM(s) IV Push daily  polyethylene glycol 3350 17 Gram(s) Oral daily  pregabalin 50 milliGRAM(s) Oral every 8 hours  senna 2 Tablet(s) Oral at bedtime  simethicone 160 milliGRAM(s) Chew every 8 hours  tacrolimus 4 milliGRAM(s) Oral <User Schedule>  traMADol 25 milliGRAM(s) Oral every 8 hours    Vitals:  T(C): 36.3 (12-31-23 @ 08:00), Max: 37.4 (12-30-23 @ 20:00)  HR: 97 (12-31-23 @ 08:00) (88 - 104)  ABP: 125/59 (12-31-23 @ 08:00) (111/55 - 164/66)  ABP(mean): 79 (12-31-23 @ 08:00) (68 - 92)  RR: 21 (12-31-23 @ 08:00) (13 - 43)  SpO2: 98% (12-31-23 @ 08:00) (92% - 99%)      I&O's Summary    30 Dec 2023 07:01  -  31 Dec 2023 07:00  --------------------------------------------------------  IN: 3068.5 mL / OUT: 1685 mL / NET: 1383.5 mL    31 Dec 2023 07:01  -  31 Dec 2023 08:42  --------------------------------------------------------  IN: 184 mL / OUT: 640 mL / NET: -456 mL        Physical Exam:  General: No distress. Comfortable in chair  HEENT: EOM intact.  Neck: Neck supple. JVP 8-10 cm H2O  Chest: Clear to auscultation bilaterally  CV: Normal S1 and S2. No murmurs, rub, or gallops. Radial pulses normal.  Abdomen: Soft, non-distended, non-tender  Extremities: Warm peripherally, no edema   Neurology: Alert and oriented times three. Sensation intact  Psych: Affect normal        Labs:                        9.0    9.90  )-----------( 189      ( 31 Dec 2023 00:26 )             26.2     12-31    133<L>  |  96  |  32<H>  ----------------------------<  123<H>  3.9   |  27  |  0.83    Ca    9.0      31 Dec 2023 00:29  Phos  2.5     12-31  Mg     2.0     12-31    TPro  6.1  /  Alb  3.9  /  TBili  0.4  /  DBili  x   /  AST  25  /  ALT  54<H>  /  AlkPhos  45  12-31        Tacrolimus (), Serum: 8.1 ng/mL (12-30-23 @ 06:45)  Tacrolimus (), Serum: 10.7 ng/mL (12-29-23 @ 06:25)  Tacrolimus (), Serum: 2.6 ng/mL (12-28-23 @ 07:20)  Tacrolimus (), Serum: <0.8 ng/mL (12-27-23 @ 06:44)

## 2023-12-31 NOTE — PROGRESS NOTE ADULT - ASSESSMENT
58M : HFrEF (LVIDd 6.4 cm, LVEF 32%), PCI ('08), HTN, DMT2 (A1c 8.3%) and CVA s/p TPA ('18), recently treated for PNA who initially presented to [facility] via EMS after syncope reportedly requiring defibrillation. Treated for ACS but left AMA to come to [facility]. On arrival ECG with ST depression in lateral leads. Intubated 11/1 for respiratory failure and was found to have COVID. L/RHC 11/1revealing  of LAD and RCA, elevated filling pressures and CI of 1.5 prompting placement of IABP. Extubated 11/3. IABP was weaned to off 11/7, however the following day developed PMVT cardiac arrest with prompt CPR and defibrillation, started on IV Lidocaine/Amio and IABP ultimately replaced on 11/9. During this admission was found to be bacteremic for which she was treated for Staph epi, Enterococcus faecalis, Cutibacterium with IV abx.  Was listed Status 2, ABO A, PRA 0% (12/12).     A suitable donor was identified and on 12/25, he underwent OHT (ischemic Time: 253min, CMV -/+, Toxo -/-). The following day, extubated and IABP removed. Milrinone was being gradually weaned but when turned off yesterday, despite adequate perfusion indicies, had rise in lactate for which inotropic support was resumed. Currently appears well supported and compensated. Will again trial wean off inotrope. Will also adjust oral antihypertensive regimen with goal to wean off Cardene gtt. Plan for 1st EMBX next week on 1/2.     On 10/25/23, pt underwent OHT  - IABP removed 12/26  - Sabrina and Epi weaned off 12/26  - Milrinone off  - Procardia XL 60 mg BID  - Bumex TID  - 1st RHC/EMBx on Tuesday 1/2  - [facility]  -  Right femerol rangel in place   - Med CT x 3 - LWS      insulin gtt d/t hyperglycemia - house endo following   - d/c planning - aniticipate home  58M : HFrEF (LVIDd 6.4 cm, LVEF 32%), PCI ('08), HTN, DMT2 (A1c 8.3%) and CVA s/p TPA ('18), recently treated for PNA who initially presented to [facility] via EMS after syncope reportedly requiring defibrillation. Treated for ACS but left AMA to come to [facility]. On arrival ECG with ST depression in lateral leads. Intubated 11/1 for respiratory failure and was found to have COVID. L/RHC 11/1revealing  of LAD and RCA, elevated filling pressures and CI of 1.5 prompting placement of IABP. Extubated 11/3. IABP was weaned to off 11/7, however the following day developed PMVT cardiac arrest with prompt CPR and defibrillation, started on IV Lidocaine/Amio and IABP ultimately replaced on 11/9. During this admission was found to be bacteremic for which she was treated for Staph epi, Enterococcus faecalis, Cutibacterium with IV abx.  Was listed Status 2, ABO A, PRA 0% (12/12).     A suitable donor was identified and on 12/25, he underwent OHT (ischemic Time: 253min, CMV -/+, Toxo -/-). The following day, extubated and IABP removed. Milrinone was being gradually weaned but when turned off yesterday, despite adequate perfusion indicies, had rise in lactate for which inotropic support was resumed. Currently appears well supported and compensated. Will again trial wean off inotrope. Will also adjust oral antihypertensive regimen with goal to wean off Cardene gtt. Plan for 1st EMBX next week on 1/2.     On 10/25/23, pt underwent OHT  - IABP removed 12/26  - Sabrina and Epi weaned off 12/26  - Milrinone off  - Procardia XL 60 mg BID  - Bumex TID  - 1st RHC/EMBx on Tuesday 1/2  - [facility]  -  Right femerol rangel in place   - Med CT x 3 - LWS      insulin gtt d/t hyperglycemia - house endo following   - d/c planning - aniticipate home

## 2023-12-31 NOTE — PROGRESS NOTE ADULT - PROBLEM SELECTOR PLAN 3
·  Plan: - pretransplant was noted to have positive BCx staph epi, Enterococcus faecalis and Cutibacterium   - post transplant has been afebrile.

## 2023-12-31 NOTE — PROGRESS NOTE ADULT - PROBLEM SELECTOR PLAN 2
·  Problem: ARIC (acute kidney injury).   ·  Plan: - pre transplant had baseline Cr 1.2, peaked to 4, now at relative baseline  - cardiorenal following  - diuretics as stated above

## 2023-12-31 NOTE — PROGRESS NOTE ADULT - SUBJECTIVE AND OBJECTIVE BOX
VITAL SIGNS-Telemetry:  SR   Vital Signs Last 24 Hrs  T(C): 36.6 (23 @ 12:00), Max: 37.4 (23 @ 20:00)  T(F): 97.8 (23 @ 12:00), Max: 99.3 (23 @ 20:00)  HR: 100 (23 @ 14:30) (88 - 110)  BP: --  RR: 23 (23 @ 14:30) (13 - 42)  SpO2: 98% (23 @ 14:30) (92% - 99%)          @ 07:01  -   @ 07:00  --------------------------------------------------------  IN: 3068.5 mL / OUT: 1685 mL / NET: 1383.5 mL     @ 07:01  -   @ 15:04  --------------------------------------------------------  IN: 680 mL / OUT: 1335 mL / NET: -655 mL    Daily     Daily Weight in k.6 (31 Dec 2023 11:00)    CAPILLARY BLOOD GLUCOSE  158 (31 Dec 2023 14:00)  89 (31 Dec 2023 13:00)  110 (31 Dec 2023 12:00)  118 (31 Dec 2023 11:00)    Drains:     MS 1  [ x ] Drainage:             Med 2 [ x ]  Drainage:                Med 3 [ x ]  Drainage:  Pacing Wires        [ x ]   Settings:                                  Isolated  [  ]    PHYSICAL EXAM:  Neurology: alert and oriented x 3, nonfocal, no gross deficits  CV : S1S2  Sternal Wound :  CDI , Stable  Lungs: cta  Abdomen: soft, nontender, nondistended, positive bowel sounds, last bowel movement         Extremities:     tr edema , no calf tenderness  R femeral rangel cdi    acetaminophen     Tablet .. 650 milliGRAM(s) Oral every 6 hours  budesonide  80 MICROgram(s)/formoterol 4.5 MICROgram(s) Inhaler 1 Puff(s) Inhalation two times a day  buMETAnide Injectable 1 milliGRAM(s) IV Push <User Schedule>  chlorhexidine 4% Liquid 1 Application(s) Topical <User Schedule>  dextrose 50% Injectable 50 milliLiter(s) IV Push every 15 minutes  heparin   Injectable 5000 Unit(s) SubCutaneous every 8 hours  hydrALAZINE 100 milliGRAM(s) Oral every 8 hours  insulin regular Infusion 3 Unit(s)/Hr IV Continuous <Continuous>  lidocaine   4% Patch 3 Patch Transdermal daily  magnesium citrate Oral Solution 296 milliLiter(s) Oral daily  methocarbamol 500 milliGRAM(s) Oral every 8 hours  methylPREDNISolone sodium succinate Injectable   IV Push   methylPREDNISolone sodium succinate Injectable 20 milliGRAM(s) IV Push every 12 hours  metoclopramide Injectable 10 milliGRAM(s) IV Push every 8 hours  mycophenolate mofetil 1000 milliGRAM(s) Oral every 12 hours  naloxegol 25 milliGRAM(s) Oral daily  NIFEdipine XL 60 milliGRAM(s) Oral every 12 hours  nystatin    Suspension 876105 Unit(s) Oral <User Schedule>  pantoprazole  Injectable 40 milliGRAM(s) IV Push daily  polyethylene glycol 3350 17 Gram(s) Oral daily  pregabalin 50 milliGRAM(s) Oral every 8 hours  senna 2 Tablet(s) Oral at bedtime  simethicone 160 milliGRAM(s) Chew every 8 hours  tacrolimus 6 milliGRAM(s) Oral <User Schedule>  traMADol 25 milliGRAM(s) Oral every 8 hours  trimethoprim   80 mG/sulfamethoxazole 400 mG 1 Tablet(s) Oral daily  valGANciclovir 450 milliGRAM(s) Oral every 12 hours    Physical Therapy Rec:   Home  [ x ]   Home w/ PT  [  ]  Rehab  [  ]  Discussed with Cardiothoracic Team at AM rounds. VITAL SIGNS-Telemetry:  SR   Vital Signs Last 24 Hrs  T(C): 36.6 (23 @ 12:00), Max: 37.4 (23 @ 20:00)  T(F): 97.8 (23 @ 12:00), Max: 99.3 (23 @ 20:00)  HR: 100 (23 @ 14:30) (88 - 110)  BP: --  RR: 23 (23 @ 14:30) (13 - 42)  SpO2: 98% (23 @ 14:30) (92% - 99%)          @ 07:01  -   @ 07:00  --------------------------------------------------------  IN: 3068.5 mL / OUT: 1685 mL / NET: 1383.5 mL     @ 07:01  -   @ 15:04  --------------------------------------------------------  IN: 680 mL / OUT: 1335 mL / NET: -655 mL    Daily     Daily Weight in k.6 (31 Dec 2023 11:00)    CAPILLARY BLOOD GLUCOSE  158 (31 Dec 2023 14:00)  89 (31 Dec 2023 13:00)  110 (31 Dec 2023 12:00)  118 (31 Dec 2023 11:00)    Drains:     MS 1  [ x ] Drainage:             Med 2 [ x ]  Drainage:                Med 3 [ x ]  Drainage:  Pacing Wires        [ x ]   Settings:                                  Isolated  [  ]    PHYSICAL EXAM:  Neurology: alert and oriented x 3, nonfocal, no gross deficits  CV : S1S2  Sternal Wound :  CDI , Stable  Lungs: cta  Abdomen: soft, nontender, nondistended, positive bowel sounds, last bowel movement         Extremities:     tr edema , no calf tenderness  R femeral rangel cdi    acetaminophen     Tablet .. 650 milliGRAM(s) Oral every 6 hours  budesonide  80 MICROgram(s)/formoterol 4.5 MICROgram(s) Inhaler 1 Puff(s) Inhalation two times a day  buMETAnide Injectable 1 milliGRAM(s) IV Push <User Schedule>  chlorhexidine 4% Liquid 1 Application(s) Topical <User Schedule>  dextrose 50% Injectable 50 milliLiter(s) IV Push every 15 minutes  heparin   Injectable 5000 Unit(s) SubCutaneous every 8 hours  hydrALAZINE 100 milliGRAM(s) Oral every 8 hours  insulin regular Infusion 3 Unit(s)/Hr IV Continuous <Continuous>  lidocaine   4% Patch 3 Patch Transdermal daily  magnesium citrate Oral Solution 296 milliLiter(s) Oral daily  methocarbamol 500 milliGRAM(s) Oral every 8 hours  methylPREDNISolone sodium succinate Injectable   IV Push   methylPREDNISolone sodium succinate Injectable 20 milliGRAM(s) IV Push every 12 hours  metoclopramide Injectable 10 milliGRAM(s) IV Push every 8 hours  mycophenolate mofetil 1000 milliGRAM(s) Oral every 12 hours  naloxegol 25 milliGRAM(s) Oral daily  NIFEdipine XL 60 milliGRAM(s) Oral every 12 hours  nystatin    Suspension 384986 Unit(s) Oral <User Schedule>  pantoprazole  Injectable 40 milliGRAM(s) IV Push daily  polyethylene glycol 3350 17 Gram(s) Oral daily  pregabalin 50 milliGRAM(s) Oral every 8 hours  senna 2 Tablet(s) Oral at bedtime  simethicone 160 milliGRAM(s) Chew every 8 hours  tacrolimus 6 milliGRAM(s) Oral <User Schedule>  traMADol 25 milliGRAM(s) Oral every 8 hours  trimethoprim   80 mG/sulfamethoxazole 400 mG 1 Tablet(s) Oral daily  valGANciclovir 450 milliGRAM(s) Oral every 12 hours    Physical Therapy Rec:   Home  [ x ]   Home w/ PT  [  ]  Rehab  [  ]  Discussed with Cardiothoracic Team at AM rounds.

## 2023-12-31 NOTE — PROGRESS NOTE ADULT - PROBLEM SELECTOR PLAN 1
- s/p OHT on 12/25. Ischemic Time: 253min  - IABP removed 12/26  - Sabrina and Epi weaned off 12/26  - Milrinone off  - Currently on Cardene gtt; wean as tolerates   - Procardia XL 60 mg BID  - Target net negative 1-2L balance. Possibly transition to PO Bumex   - 1st RHC/EMBx on Tuesday 1/2  - Please keep primary Dr. Banuelos [phone] in the loop per family request. - s/p OHT on 12/25. Ischemic Time: 253min  - IABP removed 12/26  - Sabrina and Epi weaned off 12/26  - Milrinone off  - Currently on Cardene gtt; wean as tolerates   - Procardia XL 60 mg BID  - Target net negative 1-2L balance. Possibly transition to PO Bumex   - 1st RHC/EMBx on Tuesday 1/2  - Please keep primary Dr. Banuelos [phone] in the loop per family request. - s/p OHT on 12/25. Ischemic Time: 253min  - IABP removed 12/26  - Sabrina and Epi weaned off 12/26  - Milrinone off  - Procardia XL 60 mg BID  - Target net negative 1-2L balance. Bumex TID  - 1st RHC/EMBx on Tuesday 1/2  - Transfer to [facility] when bed available   - Please keep primary Dr. Banuelos [phone] in the loop per family request. - s/p OHT on 12/25. Ischemic Time: 253min  - IABP removed 12/26  - Sabrina and Epi weaned off 12/26  - Milrinone off  - Procardia XL 60 mg BID  - Target net negative 1-2L balance. Bumex TID  - 1st RHC/EMBx on Tuesday 1/2  - Transfer to [facility] when bed available   - Please keep primary Dr. Banuelos [phone] in the loop per family request.

## 2023-12-31 NOTE — PROGRESS NOTE ADULT - CRITICAL CARE SERVICES PROVIDED
Patient is critically ill, requiring critical care services.
Defer fluid needs to team. Calculations based on dosing weight which is patient's lowest BW on admission of 67.6kg. [place] Equation (2003b): 1649kcal (4/11)
Patient is critically ill, requiring critical care services.

## 2023-12-31 NOTE — PROGRESS NOTE ADULT - SUBJECTIVE AND OBJECTIVE BOX
LD VALENCIA  58y Male  MRN:[id number]    Patient is a 58y old  Male who presents with a chief complaint of NSTEMI (01 Nov 2023 20:29)    HPI:  57yo M w/ hx HTN, CAD w/ 1 stent in 2009, ICH (2008) presenting with abn ekg. Patient presented to [facility] where he was found to have STEMI, recommended to get cath however patient did not want to get it there so it left and came here.  Patient initially had cough, congestion, fever, was placed on antibiotics on Sunday.  Started feeling nauseous and had a presyncopal event after which he presented to ED last night.  Had chest pain as well.  Chest pain is midsternal.  Not currently having chest pain.  Received 4 aspirin 30 min pta. (01 Nov 2023 15:11)      Patient seen and evaluated at bedside. interval events noted    Interval HPI:   tx out of CTU    PAST MEDICAL & SURGICAL HISTORY:  HTN (hypertension)      CAD (coronary artery disease)  2009; stent      Intracranial hemorrhage  2008      Respiratory arrest  december 1st      Myocardial infarction, unspecified MI type, unspecified artery      History of coronary artery stent placement          REVIEW OF SYSTEMS:  as per hpi     VITALS:   Vital Signs Last 24 Hrs  T(C): 36.4 (31 Dec 2023 14:49), Max: 37.4 (30 Dec 2023 20:00)  T(F): 97.5 (31 Dec 2023 14:49), Max: 99.3 (30 Dec 2023 20:00)  HR: 108 (31 Dec 2023 17:07) (88 - 110)  BP: 127/65 (31 Dec 2023 14:49) (127/65 - 127/65)  BP(mean): 88 (31 Dec 2023 14:49) (88 - 88)  RR: 18 (31 Dec 2023 14:49) (13 - 28)  SpO2: 95% (31 Dec 2023 17:07) (92% - 99%)    Parameters below as of 31 Dec 2023 17:07  Patient On (Oxygen Delivery Method): room air            PHYSICAL EXAM:  GENERAL: NAD, comfortable.    HEAD:  Atraumatic, Normocephalic  EYES: EOMI, PERRLA, conjunctiva and sclera clear  NECK:  No JVD.    CHEST/LUNG: Clear to auscultation bilaterally; No wheeze +chest tubes  HEART: Regular rate and rhythm; No murmurs, rubs, or gallops  ABDOMEN: Soft, Nontender, Nondistended; Bowel sounds present  EXTREMITIES:  2+ Peripheral Pulses, No clubbing, cyanosis, or edema  NEUROLOGY: a0x3          Consultant(s) Notes Reviewed:  [x ] YES  [ ] NO  Care Discussed with Consultants/Other Providers [ x] YES  [ ] NO    MEDS:   MEDICATIONS  (STANDING):  acetaminophen     Tablet .. 650 milliGRAM(s) Oral every 6 hours  budesonide  80 MICROgram(s)/formoterol 4.5 MICROgram(s) Inhaler 1 Puff(s) Inhalation two times a day  buMETAnide Injectable 1 milliGRAM(s) IV Push <User Schedule>  chlorhexidine 4% Liquid 1 Application(s) Topical <User Schedule>  dextrose 50% Injectable 50 milliLiter(s) IV Push every 15 minutes  heparin   Injectable 5000 Unit(s) SubCutaneous every 8 hours  hydrALAZINE 100 milliGRAM(s) Oral every 8 hours  insulin regular Infusion 3 Unit(s)/Hr (3 mL/Hr) IV Continuous <Continuous>  lidocaine   4% Patch 3 Patch Transdermal daily  magnesium citrate Oral Solution 296 milliLiter(s) Oral daily  methocarbamol 500 milliGRAM(s) Oral every 8 hours  methylPREDNISolone sodium succinate Injectable   IV Push   methylPREDNISolone sodium succinate Injectable 20 milliGRAM(s) IV Push every 12 hours  metoclopramide Injectable 10 milliGRAM(s) IV Push every 8 hours  mycophenolate mofetil 1000 milliGRAM(s) Oral every 12 hours  naloxegol 25 milliGRAM(s) Oral daily  NIFEdipine XL 60 milliGRAM(s) Oral every 12 hours  nystatin    Suspension 278958 Unit(s) Oral <User Schedule>  pantoprazole  Injectable 40 milliGRAM(s) IV Push daily  polyethylene glycol 3350 17 Gram(s) Oral daily  pregabalin 50 milliGRAM(s) Oral every 8 hours  senna 2 Tablet(s) Oral at bedtime  simethicone 160 milliGRAM(s) Chew every 8 hours  tacrolimus 6 milliGRAM(s) Oral <User Schedule>  traMADol 25 milliGRAM(s) Oral every 8 hours  trimethoprim   80 mG/sulfamethoxazole 400 mG 1 Tablet(s) Oral daily  valGANciclovir 450 milliGRAM(s) Oral every 12 hours    MEDICATIONS  (PRN):        Allergies    penicillins (Unknown)    Intolerances        LABS:                                                           9.0    9.90  )-----------( 189      ( 31 Dec 2023 00:26 )             26.2   12-31    133<L>  |  96  |  32<H>  ----------------------------<  123<H>  3.9   |  27  |  0.83    Ca    9.0      31 Dec 2023 00:29  Phos  2.5     12-31  Mg     2.0     12-31    TPro  6.1  /  Alb  3.9  /  TBili  0.4  /  DBili  x   /  AST  25  /  ALT  54<H>  /  AlkPhos  45  12-31      < from: CT Abdomen and Pelvis No Cont (11.28.23 @ 03:38) >  IMPRESSION:  Mildly dilated colon and prominent but not overly dilated small bowel   with air-fluid levels. No discrete transition point. Findings are   suggestive of ileus.    Intra-aortic balloon pump with the inferior marker at the level of the   inferior mesenteric artery and the balloon overlying the origins of the   renal arteries, celiac artery, and superior mesenteric artery. Consider   repositioning. Concern for IABP positioning was discussed with LANETTE Hawthorne   on 11/28/2023 at 3:42 AM by Dr. Shepard.    < end of copied text >          < from: Colonoscopy (11.17.23 @ 10:35) >                                                                                                        Impression:          - The entire examined colon is normal on direct and retroflexion views.                       - No specimens collected.  Recommendation:      - Resume previous diet today.                       - No large polyps or masses detected, No objection from GI standpoint to                 proceed with heart transplantation/advanced heart therapies.                       - Please call back should any further questions or concerns arise.    < end of copied text >     < from: Xray Chest 1 View- PORTABLE-Urgent (11.01.23 @ 07:42) >    IMPRESSION:  Clear lungs.    ---End of Report ---        < end of copied text >  < from: TTE W or WO Ultrasound Enhancing Agent (11.01.23 @ 10:23) >  _____________________________     CONCLUSIONS:      1. Left ventricular cavity is moderately dilated. Left ventricular wall thickness is normal. Left ventricular systolic function is severely decreased with an ejection fraction of 32 % by Chinchilla's method of disks. Regional wall motion abnormalities present.   2. Multiple segmental abnormalities exist. See findings.   3. There is moderate (grade 2) left ventricular diastolic dysfunction, with indeterminant filling pressure.   4. Normal right ventricular cavity size, wall thickness, and systolic function.   5. No significant valvular disease.   6. No pericardial effusion seen.   7. Compared to the transthoracic echocardiogram performed on 1/25/2017 the areas of akinesis are unchanged but there has been a decline in LV systolic function with new areas of hypokinesis.    __________________________________________________________________    < end of copied text >  < from: TTE Limited W or WO Ultrasound Enhancing Agent (11.02.23 @ 07:41) >  __________________________     CONCLUSIONS:      1. After obtaining consent, Definity ultrasound enhancing agent was given for enhanced left ventricular opacification and improved delineation of the left ventricular endocardial borders. Left ventricular systolic function is severely decreased with a calculated ejection fraction of 22 % by the Chinchilla's biplane method of disks. There is a left ventricular thrombus.   2. Findings were discussed with Litzy BOSS on 11/2/2023 at 8.49am.   3. There is a left ventricular thrombus.    _________________________________________________________________    < end of copied text >   LD VALENCIA  58y Male  MRN:[id number]    Patient is a 58y old  Male who presents with a chief complaint of NSTEMI (01 Nov 2023 20:29)    HPI:  59yo M w/ hx HTN, CAD w/ 1 stent in 2009, ICH (2008) presenting with abn ekg. Patient presented to [facility] where he was found to have STEMI, recommended to get cath however patient did not want to get it there so it left and came here.  Patient initially had cough, congestion, fever, was placed on antibiotics on Sunday.  Started feeling nauseous and had a presyncopal event after which he presented to ED last night.  Had chest pain as well.  Chest pain is midsternal.  Not currently having chest pain.  Received 4 aspirin 30 min pta. (01 Nov 2023 15:11)      Patient seen and evaluated at bedside. interval events noted    Interval HPI:   tx out of CTU    PAST MEDICAL & SURGICAL HISTORY:  HTN (hypertension)      CAD (coronary artery disease)  2009; stent      Intracranial hemorrhage  2008      Respiratory arrest  december 1st      Myocardial infarction, unspecified MI type, unspecified artery      History of coronary artery stent placement          REVIEW OF SYSTEMS:  as per hpi     VITALS:   Vital Signs Last 24 Hrs  T(C): 36.4 (31 Dec 2023 14:49), Max: 37.4 (30 Dec 2023 20:00)  T(F): 97.5 (31 Dec 2023 14:49), Max: 99.3 (30 Dec 2023 20:00)  HR: 108 (31 Dec 2023 17:07) (88 - 110)  BP: 127/65 (31 Dec 2023 14:49) (127/65 - 127/65)  BP(mean): 88 (31 Dec 2023 14:49) (88 - 88)  RR: 18 (31 Dec 2023 14:49) (13 - 28)  SpO2: 95% (31 Dec 2023 17:07) (92% - 99%)    Parameters below as of 31 Dec 2023 17:07  Patient On (Oxygen Delivery Method): room air            PHYSICAL EXAM:  GENERAL: NAD, comfortable.    HEAD:  Atraumatic, Normocephalic  EYES: EOMI, PERRLA, conjunctiva and sclera clear  NECK:  No JVD.    CHEST/LUNG: Clear to auscultation bilaterally; No wheeze +chest tubes  HEART: Regular rate and rhythm; No murmurs, rubs, or gallops  ABDOMEN: Soft, Nontender, Nondistended; Bowel sounds present  EXTREMITIES:  2+ Peripheral Pulses, No clubbing, cyanosis, or edema  NEUROLOGY: a0x3          Consultant(s) Notes Reviewed:  [x ] YES  [ ] NO  Care Discussed with Consultants/Other Providers [ x] YES  [ ] NO    MEDS:   MEDICATIONS  (STANDING):  acetaminophen     Tablet .. 650 milliGRAM(s) Oral every 6 hours  budesonide  80 MICROgram(s)/formoterol 4.5 MICROgram(s) Inhaler 1 Puff(s) Inhalation two times a day  buMETAnide Injectable 1 milliGRAM(s) IV Push <User Schedule>  chlorhexidine 4% Liquid 1 Application(s) Topical <User Schedule>  dextrose 50% Injectable 50 milliLiter(s) IV Push every 15 minutes  heparin   Injectable 5000 Unit(s) SubCutaneous every 8 hours  hydrALAZINE 100 milliGRAM(s) Oral every 8 hours  insulin regular Infusion 3 Unit(s)/Hr (3 mL/Hr) IV Continuous <Continuous>  lidocaine   4% Patch 3 Patch Transdermal daily  magnesium citrate Oral Solution 296 milliLiter(s) Oral daily  methocarbamol 500 milliGRAM(s) Oral every 8 hours  methylPREDNISolone sodium succinate Injectable   IV Push   methylPREDNISolone sodium succinate Injectable 20 milliGRAM(s) IV Push every 12 hours  metoclopramide Injectable 10 milliGRAM(s) IV Push every 8 hours  mycophenolate mofetil 1000 milliGRAM(s) Oral every 12 hours  naloxegol 25 milliGRAM(s) Oral daily  NIFEdipine XL 60 milliGRAM(s) Oral every 12 hours  nystatin    Suspension 494786 Unit(s) Oral <User Schedule>  pantoprazole  Injectable 40 milliGRAM(s) IV Push daily  polyethylene glycol 3350 17 Gram(s) Oral daily  pregabalin 50 milliGRAM(s) Oral every 8 hours  senna 2 Tablet(s) Oral at bedtime  simethicone 160 milliGRAM(s) Chew every 8 hours  tacrolimus 6 milliGRAM(s) Oral <User Schedule>  traMADol 25 milliGRAM(s) Oral every 8 hours  trimethoprim   80 mG/sulfamethoxazole 400 mG 1 Tablet(s) Oral daily  valGANciclovir 450 milliGRAM(s) Oral every 12 hours    MEDICATIONS  (PRN):        Allergies    penicillins (Unknown)    Intolerances        LABS:                                                           9.0    9.90  )-----------( 189      ( 31 Dec 2023 00:26 )             26.2   12-31    133<L>  |  96  |  32<H>  ----------------------------<  123<H>  3.9   |  27  |  0.83    Ca    9.0      31 Dec 2023 00:29  Phos  2.5     12-31  Mg     2.0     12-31    TPro  6.1  /  Alb  3.9  /  TBili  0.4  /  DBili  x   /  AST  25  /  ALT  54<H>  /  AlkPhos  45  12-31      < from: CT Abdomen and Pelvis No Cont (11.28.23 @ 03:38) >  IMPRESSION:  Mildly dilated colon and prominent but not overly dilated small bowel   with air-fluid levels. No discrete transition point. Findings are   suggestive of ileus.    Intra-aortic balloon pump with the inferior marker at the level of the   inferior mesenteric artery and the balloon overlying the origins of the   renal arteries, celiac artery, and superior mesenteric artery. Consider   repositioning. Concern for IABP positioning was discussed with LANETTE Hawthorne   on 11/28/2023 at 3:42 AM by Dr. Shepard.    < end of copied text >          < from: Colonoscopy (11.17.23 @ 10:35) >                                                                                                        Impression:          - The entire examined colon is normal on direct and retroflexion views.                       - No specimens collected.  Recommendation:      - Resume previous diet today.                       - No large polyps or masses detected, No objection from GI standpoint to                 proceed with heart transplantation/advanced heart therapies.                       - Please call back should any further questions or concerns arise.    < end of copied text >     < from: Xray Chest 1 View- PORTABLE-Urgent (11.01.23 @ 07:42) >    IMPRESSION:  Clear lungs.    ---End of Report ---        < end of copied text >  < from: TTE W or WO Ultrasound Enhancing Agent (11.01.23 @ 10:23) >  _____________________________     CONCLUSIONS:      1. Left ventricular cavity is moderately dilated. Left ventricular wall thickness is normal. Left ventricular systolic function is severely decreased with an ejection fraction of 32 % by Chinchilla's method of disks. Regional wall motion abnormalities present.   2. Multiple segmental abnormalities exist. See findings.   3. There is moderate (grade 2) left ventricular diastolic dysfunction, with indeterminant filling pressure.   4. Normal right ventricular cavity size, wall thickness, and systolic function.   5. No significant valvular disease.   6. No pericardial effusion seen.   7. Compared to the transthoracic echocardiogram performed on 1/25/2017 the areas of akinesis are unchanged but there has been a decline in LV systolic function with new areas of hypokinesis.    __________________________________________________________________    < end of copied text >  < from: TTE Limited W or WO Ultrasound Enhancing Agent (11.02.23 @ 07:41) >  __________________________     CONCLUSIONS:      1. After obtaining consent, Definity ultrasound enhancing agent was given for enhanced left ventricular opacification and improved delineation of the left ventricular endocardial borders. Left ventricular systolic function is severely decreased with a calculated ejection fraction of 22 % by the Chinchilla's biplane method of disks. There is a left ventricular thrombus.   2. Findings were discussed with Litzy BOSS on 11/2/2023 at 8.49am.   3. There is a left ventricular thrombus.    _________________________________________________________________    < end of copied text >

## 2023-12-31 NOTE — PROGRESS NOTE ADULT - SUBJECTIVE AND OBJECTIVE BOX
Patient seen and examined at the bedside.    Remained critically ill on continuous ICU monitoring.      Brief Summary:  59 yo M w/ PMHx HTN, CAD s/p stent (2009), ICH (2008), ICM now s/p OHT with IABP on 12/25/2023.      24 Hour events:  Cardene weaned off    OBJECTIVE:  Vital Signs Last 24 Hrs  T(C): 36.6 (31 Dec 2023 04:00), Max: 37.4 (30 Dec 2023 20:00)  T(F): 97.8 (31 Dec 2023 04:00), Max: 99.3 (30 Dec 2023 20:00)  HR: 98 (31 Dec 2023 06:00) (88 - 104)  BP: --  BP(mean): --  RR: 20 (31 Dec 2023 06:00) (13 - 43)  SpO2: 98% (31 Dec 2023 06:00) (92% - 99%)    Parameters below as of 31 Dec 2023 04:00  Patient On (Oxygen Delivery Method): nasal cannula  O2 Flow (L/min): 3                  Physical Exam:   General: awake, out of bed to chair  Neurology: intact, AAOx4  Respiratory: on nasal cannula, non-labored respirations  CV: sinus rhythm  Abdominal: Soft, NT  Extremities: warm, well perfused        -------------------------------------------------------------------------------------------------------------------------------  Labs:                          9.0    9.90  )-----------( 189      ( 31 Dec 2023 00:26 )             26.2     12-31    133<L>  |  96  |  32<H>  ----------------------------<  123<H>  3.9   |  27  |  0.83    Ca    9.0      31 Dec 2023 00:29  Phos  2.5     12-31  Mg     2.0     12-31    TPro  6.1  /  Alb  3.9  /  TBili  0.4  /  DBili  x   /  AST  25  /  ALT  54<H>  /  AlkPhos  45  12-31    LIVER FUNCTIONS - ( 31 Dec 2023 00:29 )  Alb: 3.9 g/dL / Pro: 6.1 g/dL / ALK PHOS: 45 U/L / ALT: 54 U/L / AST: 25 U/L / GGT: x             ABG - ( 31 Dec 2023 00:00 )  pH, Arterial: 7.47  pH, Blood: x     /  pCO2: 38    /  pO2: 90    / HCO3: 28    / Base Excess: 3.9   /  SaO2: 98.0      ------------------------------------------------------------------------------------------------------------------------------  Assessment:  59 yo M w/ PMHx HTN, CAD s/p stent (2009), ICH (2008), ICM now s/p OHT with IABP on 12/25/2023.    S/p OHT on 12/25/2023  At risk for hemodynamic instability and cardiac arrhythmias  Post op respiratory insufficiency  Acute blood loss anemia    Plan:   ***Neuro***  Post-operative pain control with Tylenol, Tramadol, Lyrica, and Robaxin for pain.  Optimize day/night cycles.  OOBTC / PT as tolerated    ***Cardiovascular***  At risk for hemodynamic instability and cardiac arrhythmias.  Hydralazine and Nifedipine for afterload reduction - Cardene weaned to off   Monitor chest tube output.   EMB 1/2/24.  D/c left pleural chest tube  D/c CVL.    ***Pulmonary***.  Acute post-operative respiratory insufficiency  On Nasal Cannula. Wean oxygen as able.  Deep breathing and coughing exercises.  Continue bronchodilator as needed.     ***GI***  Continue diet.  Protonix for stress ulcer prophylaxis   Bowel regimen.  Reglan for gut motility   Simethicone for gas     ***Renal***  Replete electrolytes as indicated.  Diuresis with Bumex  Goal net negative 1L.    ***ID***  Immunosuppression - tacro, cellcept, steroids. Received simulect yesterday.  Nystatin for prophylaxis    ***Endocrine***  DM2 - Insulin gtt as needed to maintain euglycemia - will transition to ISS     ***Hematology***  Acute blood loss anemia - No current transfusion indication  SQ Heparin for DVT prophylaxis              Patient requires continuous monitoring with bedside rhythm monitoring, pulse oximetry monitoring, and continuous central venous and arterial pressure monitoring; and intermittent blood gas analysis. Care plan discussed with the ICU care team.   Patient remained critical, at risk for life threatening decompensation.    I have spent 30 minutes providing critical care management to this patient.    By signing my name below, I, Rosalba Tapia, attest that this documentation has been prepared under the direction and in the presence of Moriah Coles MD  Electronically signed: Rosalba Phillips, 12-31-23 @ 07:22    I, Moriah Coles MD, personally performed the services described in this documentation. all medical record entries made by the scribe were at my direction and in my presence. I have reviewed the chart and agree that the record reflects my personal performance and is accurate and complete  Electronically signed: Moriah Coles MD Patient seen and examined at the bedside.    Remained critically ill on continuous ICU monitoring.      Brief Summary:  57 yo M w/ PMHx HTN, CAD s/p stent (2009), ICH (2008), ICM now s/p OHT with IABP on 12/25/2023.      24 Hour events:  Cardene weaned off    OBJECTIVE:  Vital Signs Last 24 Hrs  T(C): 36.6 (31 Dec 2023 04:00), Max: 37.4 (30 Dec 2023 20:00)  T(F): 97.8 (31 Dec 2023 04:00), Max: 99.3 (30 Dec 2023 20:00)  HR: 98 (31 Dec 2023 06:00) (88 - 104)  BP: --  BP(mean): --  RR: 20 (31 Dec 2023 06:00) (13 - 43)  SpO2: 98% (31 Dec 2023 06:00) (92% - 99%)    Parameters below as of 31 Dec 2023 04:00  Patient On (Oxygen Delivery Method): nasal cannula  O2 Flow (L/min): 3                  Physical Exam:   General: awake, out of bed to chair  Neurology: intact, AAOx4  Respiratory: on nasal cannula, non-labored respirations  CV: sinus rhythm  Abdominal: Soft, NT  Extremities: warm, well perfused        -------------------------------------------------------------------------------------------------------------------------------  Labs:                          9.0    9.90  )-----------( 189      ( 31 Dec 2023 00:26 )             26.2     12-31    133<L>  |  96  |  32<H>  ----------------------------<  123<H>  3.9   |  27  |  0.83    Ca    9.0      31 Dec 2023 00:29  Phos  2.5     12-31  Mg     2.0     12-31    TPro  6.1  /  Alb  3.9  /  TBili  0.4  /  DBili  x   /  AST  25  /  ALT  54<H>  /  AlkPhos  45  12-31    LIVER FUNCTIONS - ( 31 Dec 2023 00:29 )  Alb: 3.9 g/dL / Pro: 6.1 g/dL / ALK PHOS: 45 U/L / ALT: 54 U/L / AST: 25 U/L / GGT: x             ABG - ( 31 Dec 2023 00:00 )  pH, Arterial: 7.47  pH, Blood: x     /  pCO2: 38    /  pO2: 90    / HCO3: 28    / Base Excess: 3.9   /  SaO2: 98.0      ------------------------------------------------------------------------------------------------------------------------------  Assessment:  57 yo M w/ PMHx HTN, CAD s/p stent (2009), ICH (2008), ICM now s/p OHT with IABP on 12/25/2023.    S/p OHT on 12/25/2023  At risk for hemodynamic instability and cardiac arrhythmias  Post op respiratory insufficiency  Acute blood loss anemia    Plan:   ***Neuro***  Post-operative pain control with Tylenol, Tramadol, Lyrica, and Robaxin for pain.  Optimize day/night cycles.  OOBTC / PT as tolerated    ***Cardiovascular***  At risk for hemodynamic instability and cardiac arrhythmias.  Hydralazine and Nifedipine for afterload reduction - Cardene weaned to off   Monitor chest tube output.   EMB 1/2/24.  D/c left pleural chest tube  D/c CVL.    ***Pulmonary***.  Acute post-operative respiratory insufficiency  On Nasal Cannula. Wean oxygen as able.  Deep breathing and coughing exercises.  Continue bronchodilator as needed.     ***GI***  Continue diet.  Protonix for stress ulcer prophylaxis   Bowel regimen.  Reglan for gut motility   Simethicone for gas     ***Renal***  Replete electrolytes as indicated.  Diuresis with Bumex  Goal net negative 1L.    ***ID***  Immunosuppression - tacro, cellcept, steroids. Received simulect yesterday.  Nystatin for prophylaxis    ***Endocrine***  DM2 - Insulin gtt as needed to maintain euglycemia - will transition to ISS     ***Hematology***  Acute blood loss anemia - No current transfusion indication  SQ Heparin for DVT prophylaxis              Patient requires continuous monitoring with bedside rhythm monitoring, pulse oximetry monitoring, and continuous central venous and arterial pressure monitoring; and intermittent blood gas analysis. Care plan discussed with the ICU care team.   Patient remained critical, at risk for life threatening decompensation.    I have spent 30 minutes providing critical care management to this patient.    By signing my name below, I, Rosalba Tapia, attest that this documentation has been prepared under the direction and in the presence of Moriah Coles MD  Electronically signed: Rosalba Phillips, 12-31-23 @ 07:22    I, Moriah Coles MD, personally performed the services described in this documentation. all medical record entries made by the scribe were at my direction and in my presence. I have reviewed the chart and agree that the record reflects my personal performance and is accurate and complete  Electronically signed: Moriah Coles MD Patient seen and examined at the bedside.    Remained critically ill on continuous ICU monitoring.      Brief Summary:  57 yo M w/ PMHx HTN, CAD s/p stent (2009), ICH (2008), ICM now s/p OHT with IABP on 12/25/2023.      24 Hour events:  No acute events overnight  L Pl CT d/daniela    Cardene weaned off      OBJECTIVE:  Vital Signs Last 24 Hrs  T(C): 36.6 (31 Dec 2023 04:00), Max: 37.4 (30 Dec 2023 20:00)  T(F): 97.8 (31 Dec 2023 04:00), Max: 99.3 (30 Dec 2023 20:00)  HR: 98 (31 Dec 2023 06:00) (88 - 104)  BP: --  BP(mean): --  RR: 20 (31 Dec 2023 06:00) (13 - 43)  SpO2: 98% (31 Dec 2023 06:00) (92% - 99%)    Parameters below as of 31 Dec 2023 04:00  Patient On (Oxygen Delivery Method): nasal cannula  O2 Flow (L/min): 3                  Physical Exam:   General: awake, OOBTC, Ambulating in hallway  Neurology: intact, AAOx4  Respiratory: on nasal cannula, non-labored respirations  CV: sinus rhythm  Abdominal: Soft, NT  Extremities: warm, well perfused        -------------------------------------------------------------------------------------------------------------------------------  Labs:                          9.0    9.90  )-----------( 189      ( 31 Dec 2023 00:26 )             26.2     12-31    133<L>  |  96  |  32<H>  ----------------------------<  123<H>  3.9   |  27  |  0.83    Ca    9.0      31 Dec 2023 00:29  Phos  2.5     12-31  Mg     2.0     12-31    TPro  6.1  /  Alb  3.9  /  TBili  0.4  /  DBili  x   /  AST  25  /  ALT  54<H>  /  AlkPhos  45  12-31    LIVER FUNCTIONS - ( 31 Dec 2023 00:29 )  Alb: 3.9 g/dL / Pro: 6.1 g/dL / ALK PHOS: 45 U/L / ALT: 54 U/L / AST: 25 U/L / GGT: x             ABG - ( 31 Dec 2023 00:00 )  pH, Arterial: 7.47  pH, Blood: x     /  pCO2: 38    /  pO2: 90    / HCO3: 28    / Base Excess: 3.9   /  SaO2: 98.0      ------------------------------------------------------------------------------------------------------------------------------  Assessment:  57 yo M w/ PMHx HTN, CAD s/p stent (2009), ICH (2008), ICM now s/p OHT with IABP on 12/25/2023.    S/p OHT on 12/25/2023  At risk for hemodynamic instability and cardiac arrhythmias  Post op respiratory insufficiency  Acute blood loss anemia    Plan:   ***Neuro***  Post-operative pain control with Tylenol, Tramadol, Lyrica, and Robaxin for pain.  Optimize day/night cycles.  OOBTC / PT as tolerated    ***Cardiovascular***  At risk for hemodynamic instability and cardiac arrhythmias.  Hydralazine and Nifedipine for afterload reduction - Cardene weaned to off   Monitor chest tube output.   EMB 1/2/24.  Left pleural chest tube D/daniela  D/c CVL.    ***Pulmonary***.  Acute post-operative respiratory insufficiency  On Nasal Cannula. Wean oxygen as able.  Deep breathing and coughing exercises.  Continue bronchodilator as needed.     ***GI***  Continue diet.  Protonix for stress ulcer prophylaxis   Bowel regimen.  Reglan for gut motility   Simethicone for gas     ***Renal***  Replete electrolytes as indicated.  Diuresis with Bumex  Goal net negative 1L.    ***ID***  Immunosuppression - tacro, cellcept, steroids.   Nystatin for prophylaxis    ***Endocrine***  DM2 - Insulin gtt as needed to maintain euglycemia     ***Hematology***  Acute blood loss anemia - No current transfusion indication  SQ Heparin for DVT prophylaxis              Patient requires continuous monitoring with bedside rhythm monitoring, pulse oximetry monitoring, and continuous central venous and arterial pressure monitoring; and intermittent blood gas analysis. Care plan discussed with the ICU care team.   Patient remained critical, at risk for life threatening decompensation.    I have spent 30 minutes providing critical care management to this patient.    By signing my name below, I, Rosalba Tapia, attest that this documentation has been prepared under the direction and in the presence of Moriah Coles MD  Electronically signed: Rosalba Phillips, 12-31-23 @ 07:22    I, Moriah Coles MD, personally performed the services described in this documentation. all medical record entries made by the scribe were at my direction and in my presence. I have reviewed the chart and agree that the record reflects my personal performance and is accurate and complete  Electronically signed: Moriah Coles MD Patient seen and examined at the bedside.    Remains critically ill on continuous ICU monitoring.      Brief Summary:  57 yo M w/ PMHx HTN, CAD s/p stent (2009), ICH (2008), ICM now s/p OHT with IABP on 12/25/2023.      24 Hour events:  No acute events overnight  Cardene weaned to off  Ambulating well.      Objective:  Vital Signs Last 24 Hrs  T(C): 36.6 (31 Dec 2023 04:00), Max: 37.4 (30 Dec 2023 20:00)  T(F): 97.8 (31 Dec 2023 04:00), Max: 99.3 (30 Dec 2023 20:00)  HR: 98 (31 Dec 2023 06:00) (88 - 104)  BP: --  BP(mean): --  RR: 20 (31 Dec 2023 06:00) (13 - 43)  SpO2: 98% (31 Dec 2023 06:00) (92% - 99%)    Parameters below as of 31 Dec 2023 04:00  Patient On (Oxygen Delivery Method): nasal cannula  O2 Flow (L/min): 3            Physical Exam:   General: Interactive, OOB to chair.  Neurology: Oriented, no focal deficits.  Respiratory: On nasal cannula, non-labored respirations  CV: Sinus rhythm  Abdominal: Soft, nontender  Extremities: Warm, well-perfused        -------------------------------------------------------------------------------------------------------------------------------  Labs:                          9.0    9.90  )-----------( 189      ( 31 Dec 2023 00:26 )             26.2     12-31    133<L>  |  96  |  32<H>  ----------------------------<  123<H>  3.9   |  27  |  0.83    Ca    9.0      31 Dec 2023 00:29  Phos  2.5     12-31  Mg     2.0     12-31    TPro  6.1  /  Alb  3.9  /  TBili  0.4  /  DBili  x   /  AST  25  /  ALT  54<H>  /  AlkPhos  45  12-31    LIVER FUNCTIONS - ( 31 Dec 2023 00:29 )  Alb: 3.9 g/dL / Pro: 6.1 g/dL / ALK PHOS: 45 U/L / ALT: 54 U/L / AST: 25 U/L / GGT: x             ABG - ( 31 Dec 2023 00:00 )  pH, Arterial: 7.47  pH, Blood: x     /  pCO2: 38    /  pO2: 90    / HCO3: 28    / Base Excess: 3.9   /  SaO2: 98.0      ------------------------------------------------------------------------------------------------------------------------------  Assessment:  57 yo M s/p OHT with IABP on 12/25/2023.    At risk for hemodynamic instability and cardiac arrhythmias  Post op acute pulmonary insufficiency  Acute blood loss anemia  Hyperglycemia      Plan:   ***Neuro***  Post-operative pain control with Tylenol, Tramadol, Lyrica, and Robaxin for pain.  Optimize day/night cycles.  OOB / PT as tolerated    ***Cardiovascular***  At risk for hemodynamic instability and cardiac arrhythmias.  Hydralazine and Nifedipine for afterload reduction - Cardene weaned to off   Monitor chest tube output.   Plan for biopsy this week.    ***Pulmonary***.  Acute post-operative pulmonary insufficiency  Wean oxygen as able.  Deep breathing and coughing exercises.  Continue bronchodilator as needed.     ***GI***  Tolerating diet.  Protonix for stress ulcer prophylaxis   Bowel regimen.    ***Renal***  Replete electrolytes as indicated.  Bumex for goal net negative.    ***ID***  Immunosuppression - Tacro, Cellcept, Steroids and s/p Simulect x 2  Nystatin for prophylaxis    ***Endocrine***  Hyperglycemia - Insulin infusion    ***Hematology***  Acute blood loss anemia - No current transfusion indication  SQ Heparin for DVT prophylaxis      Patient requires continuous monitoring with bedside rhythm monitoring, pulse oximetry monitoring, and continuous central venous and arterial pressure monitoring; and intermittent blood gas analysis. Care plan discussed with the ICU care team.   Patient remains critical, at risk for life threatening decompensation.    I have spent 30 minutes providing critical care management to this patient.    By signing my name below, I, Rosalba Tapia, attest that this documentation has been prepared under the direction and in the presence of Moriah Coles MD  Electronically signed: Rosalba Phillips, 12-31-23 @ 07:22    I, Moriah Coles MD, personally performed the services described in this documentation. all medical record entries made by the scribe were at my direction and in my presence. I have reviewed the chart and agree that the record reflects my personal performance and is accurate and complete  Electronically signed: Moriah Coles MD Patient seen and examined at the bedside.    Remains critically ill on continuous ICU monitoring.      Brief Summary:  59 yo M w/ PMHx HTN, CAD s/p stent (2009), ICH (2008), ICM now s/p OHT with IABP on 12/25/2023.      24 Hour events:  No acute events overnight  Cardene weaned to off  Ambulating well.      Objective:  Vital Signs Last 24 Hrs  T(C): 36.6 (31 Dec 2023 04:00), Max: 37.4 (30 Dec 2023 20:00)  T(F): 97.8 (31 Dec 2023 04:00), Max: 99.3 (30 Dec 2023 20:00)  HR: 98 (31 Dec 2023 06:00) (88 - 104)  BP: --  BP(mean): --  RR: 20 (31 Dec 2023 06:00) (13 - 43)  SpO2: 98% (31 Dec 2023 06:00) (92% - 99%)    Parameters below as of 31 Dec 2023 04:00  Patient On (Oxygen Delivery Method): nasal cannula  O2 Flow (L/min): 3            Physical Exam:   General: Interactive, OOB to chair.  Neurology: Oriented, no focal deficits.  Respiratory: On nasal cannula, non-labored respirations  CV: Sinus rhythm  Abdominal: Soft, nontender  Extremities: Warm, well-perfused        -------------------------------------------------------------------------------------------------------------------------------  Labs:                          9.0    9.90  )-----------( 189      ( 31 Dec 2023 00:26 )             26.2     12-31    133<L>  |  96  |  32<H>  ----------------------------<  123<H>  3.9   |  27  |  0.83    Ca    9.0      31 Dec 2023 00:29  Phos  2.5     12-31  Mg     2.0     12-31    TPro  6.1  /  Alb  3.9  /  TBili  0.4  /  DBili  x   /  AST  25  /  ALT  54<H>  /  AlkPhos  45  12-31    LIVER FUNCTIONS - ( 31 Dec 2023 00:29 )  Alb: 3.9 g/dL / Pro: 6.1 g/dL / ALK PHOS: 45 U/L / ALT: 54 U/L / AST: 25 U/L / GGT: x             ABG - ( 31 Dec 2023 00:00 )  pH, Arterial: 7.47  pH, Blood: x     /  pCO2: 38    /  pO2: 90    / HCO3: 28    / Base Excess: 3.9   /  SaO2: 98.0      ------------------------------------------------------------------------------------------------------------------------------  Assessment:  59 yo M s/p OHT with IABP on 12/25/2023.    At risk for hemodynamic instability and cardiac arrhythmias  Post op acute pulmonary insufficiency  Acute blood loss anemia  Hyperglycemia      Plan:   ***Neuro***  Post-operative pain control with Tylenol, Tramadol, Lyrica, and Robaxin for pain.  Optimize day/night cycles.  OOB / PT as tolerated    ***Cardiovascular***  At risk for hemodynamic instability and cardiac arrhythmias.  Hydralazine and Nifedipine for afterload reduction - Cardene weaned to off   Monitor chest tube output.   Plan for biopsy this week.    ***Pulmonary***.  Acute post-operative pulmonary insufficiency  Wean oxygen as able.  Deep breathing and coughing exercises.  Continue bronchodilator as needed.     ***GI***  Tolerating diet.  Protonix for stress ulcer prophylaxis   Bowel regimen.    ***Renal***  Replete electrolytes as indicated.  Bumex for goal net negative.    ***ID***  Immunosuppression - Tacro, Cellcept, Steroids and s/p Simulect x 2  Nystatin for prophylaxis    ***Endocrine***  Hyperglycemia - Insulin infusion    ***Hematology***  Acute blood loss anemia - No current transfusion indication  SQ Heparin for DVT prophylaxis      Patient requires continuous monitoring with bedside rhythm monitoring, pulse oximetry monitoring, and continuous central venous and arterial pressure monitoring; and intermittent blood gas analysis. Care plan discussed with the ICU care team.   Patient remains critical, at risk for life threatening decompensation.    I have spent 30 minutes providing critical care management to this patient.    By signing my name below, I, Rosalba Tapia, attest that this documentation has been prepared under the direction and in the presence of Moriah Coles MD  Electronically signed: Rosalba Phillips, 12-31-23 @ 07:22    I, Moriah Coles MD, personally performed the services described in this documentation. all medical record entries made by the scribe were at my direction and in my presence. I have reviewed the chart and agree that the record reflects my personal performance and is accurate and complete  Electronically signed: Moriah Coles MD

## 2023-12-31 NOTE — PROGRESS NOTE ADULT - ASSESSMENT
59 yo M with HFrEF (LVIDd 6.4 cm, LVEF 32%), CAD s/p PCI (2008), HTN, DMT2 (A1c 8.3%) and CVA s/p TPA (2018), recently treated for PNA who initially presented to [facility] via EMS after syncope reportedly requiring defibrillation. Treated for ACS but left AMA to come to [facility]. On arrival ECG with ST depression in lateral leads. Intubated 11/1 for respiratory failure and was found to have COVID. L/RHC 11/1revealing  of LAD and RCA, elevated filling pressures and CI of 1.5 prompting placement of IABP. Extubated 11/3. IABP was weaned to off 11/7, however the following day developed PMVT cardiac arrest with prompt CPR and defibrillation, started on IV Lidocaine/Amio and IABP ultimately replaced on 11/9. During this admission was found to be bacteremic for which she was treated for Staph epi, Enterococcus faecalis, Cutibacterium with IV abx.  Was listed Status 2, ABO A, PRA 0% (12/12).     A suitable donor was identified and on 12/25, he underwent OHT (ischemic Time: 253min, CMV -/+, Toxo -/-). The following day, extubated and IABP removed. Milrinone was being gradually weaned but when turned off yesterday, despite adequate perfusion indicies, had rise in lactate for which inotropic support was resumed. Currently appears well supported and compensated. Will again trial wean off inotrope. Will also adjust oral antihypertensive regimen with goal to wean off Cardene gtt. Plan for 1st EMBX next week on 1/2.  59 yo M with HFrEF (LVIDd 6.4 cm, LVEF 32%), CAD s/p PCI (2008), HTN, DMT2 (A1c 8.3%) and CVA s/p TPA (2018), recently treated for PNA who initially presented to [facility] via EMS after syncope reportedly requiring defibrillation. Treated for ACS but left AMA to come to [facility]. On arrival ECG with ST depression in lateral leads. Intubated 11/1 for respiratory failure and was found to have COVID. L/RHC 11/1revealing  of LAD and RCA, elevated filling pressures and CI of 1.5 prompting placement of IABP. Extubated 11/3. IABP was weaned to off 11/7, however the following day developed PMVT cardiac arrest with prompt CPR and defibrillation, started on IV Lidocaine/Amio and IABP ultimately replaced on 11/9. During this admission was found to be bacteremic for which she was treated for Staph epi, Enterococcus faecalis, Cutibacterium with IV abx.  Was listed Status 2, ABO A, PRA 0% (12/12).     A suitable donor was identified and on 12/25, he underwent OHT (ischemic Time: 253min, CMV -/+, Toxo -/-). The following day, extubated and IABP removed. Milrinone was being gradually weaned but when turned off yesterday, despite adequate perfusion indicies, had rise in lactate for which inotropic support was resumed. Currently appears well supported and compensated. Will again trial wean off inotrope. Will also adjust oral antihypertensive regimen with goal to wean off Cardene gtt. Plan for 1st EMBX next week on 1/2.

## 2023-12-31 NOTE — PROGRESS NOTE ADULT - PROBLEM SELECTOR PLAN 1
- IABP removed 12/26  - Sabrina and Epi weaned off 12/26  - Milrinone off  - Procardia XL 60 mg BID  - Bumex TID  - 1st RHC/EMBx on Tuesday 1/2  - Transfer to Stepdown 12/31  -  Right femerol rangel in place   - Med CT x 3 - LWS    - d/c planning - aniticipate home

## 2023-12-31 NOTE — PROGRESS NOTE ADULT - ASSESSMENT
59 yo male h/o htn, cad s/p pci, ICH, here with NSTEMI  s/p intubation and cath. now in CCU    NSTEMI  s/p  cath  cath results noted. multi vessel dz., CTSx f/u.    pt with vtach/fib arrest again on 11/8. s/p ACLS and ROSC.   in ccu with iabp   plan for transplant as per HF and transplant team  transplant w/u ongoing.   s/p colonoscopy 11/17. normal  now listed for transplant as of 11/22 12/25: pt s/p heart transplant last night. remains intubated with iabp in CTU.   12/26: pt extubated to high flow this am. IABP currently being removed. pt a0x3.   12/27: pt feels well. walked with PT this am. currently comforable sitting in chair. IABP removed. wean off pressors as able.   12/28-29: no acute events o/n. weaning down ionotropes/pressors as able. immunosuppressives as per transplant team. PT  12/30: feels well. pressors/ionotropes weaned off. removal of chest tubes as able. immunosupressives as per id. PT.  12/31: feels well. tx out of ctu to step down unit. cont care as per ctsx team. PT      mngt as per CTU team          Advanced care planning was discussed with patient and family.  Advanced care planning forms were reviewed and discussed as appropriate.  Differential diagnosis and plan of care discussed with patient after the evaluation.   Pain assessed and judicious use of narcotics when appropriate was discussed.  Importance of Fall prevention discussed.  Counseling on Smoking and Alcohol cessation was offered when appropriate.  Counseling on Diet, exercise, and medication compliance was done.       Approx 75 minutes spent. 57 yo male h/o htn, cad s/p pci, ICH, here with NSTEMI  s/p intubation and cath. now in CCU    NSTEMI  s/p  cath  cath results noted. multi vessel dz., CTSx f/u.    pt with vtach/fib arrest again on 11/8. s/p ACLS and ROSC.   in ccu with iabp   plan for transplant as per HF and transplant team  transplant w/u ongoing.   s/p colonoscopy 11/17. normal  now listed for transplant as of 11/22 12/25: pt s/p heart transplant last night. remains intubated with iabp in CTU.   12/26: pt extubated to high flow this am. IABP currently being removed. pt a0x3.   12/27: pt feels well. walked with PT this am. currently comforable sitting in chair. IABP removed. wean off pressors as able.   12/28-29: no acute events o/n. weaning down ionotropes/pressors as able. immunosuppressives as per transplant team. PT  12/30: feels well. pressors/ionotropes weaned off. removal of chest tubes as able. immunosupressives as per id. PT.  12/31: feels well. tx out of ctu to step down unit. cont care as per ctsx team. PT      mngt as per CTU team          Advanced care planning was discussed with patient and family.  Advanced care planning forms were reviewed and discussed as appropriate.  Differential diagnosis and plan of care discussed with patient after the evaluation.   Pain assessed and judicious use of narcotics when appropriate was discussed.  Importance of Fall prevention discussed.  Counseling on Smoking and Alcohol cessation was offered when appropriate.  Counseling on Diet, exercise, and medication compliance was done.       Approx 75 minutes spent.

## 2024-01-01 LAB
ALBUMIN SERPL ELPH-MCNC: 3.8 G/DL — SIGNIFICANT CHANGE UP (ref 3.3–5)
ALBUMIN SERPL ELPH-MCNC: 3.8 G/DL — SIGNIFICANT CHANGE UP (ref 3.3–5)
ALP SERPL-CCNC: 43 U/L — SIGNIFICANT CHANGE UP (ref 40–120)
ALP SERPL-CCNC: 43 U/L — SIGNIFICANT CHANGE UP (ref 40–120)
ALT FLD-CCNC: 62 U/L — HIGH (ref 10–45)
ALT FLD-CCNC: 62 U/L — HIGH (ref 10–45)
ANION GAP SERPL CALC-SCNC: 9 MMOL/L — SIGNIFICANT CHANGE UP (ref 5–17)
ANION GAP SERPL CALC-SCNC: 9 MMOL/L — SIGNIFICANT CHANGE UP (ref 5–17)
AST SERPL-CCNC: 31 U/L — SIGNIFICANT CHANGE UP (ref 10–40)
AST SERPL-CCNC: 31 U/L — SIGNIFICANT CHANGE UP (ref 10–40)
BASOPHILS # BLD AUTO: 0.01 K/UL — SIGNIFICANT CHANGE UP (ref 0–0.2)
BASOPHILS # BLD AUTO: 0.01 K/UL — SIGNIFICANT CHANGE UP (ref 0–0.2)
BASOPHILS NFR BLD AUTO: 0.1 % — SIGNIFICANT CHANGE UP (ref 0–2)
BASOPHILS NFR BLD AUTO: 0.1 % — SIGNIFICANT CHANGE UP (ref 0–2)
BILIRUB SERPL-MCNC: 0.4 MG/DL — SIGNIFICANT CHANGE UP (ref 0.2–1.2)
BILIRUB SERPL-MCNC: 0.4 MG/DL — SIGNIFICANT CHANGE UP (ref 0.2–1.2)
BUN SERPL-MCNC: 38 MG/DL — HIGH (ref 7–23)
BUN SERPL-MCNC: 38 MG/DL — HIGH (ref 7–23)
CALCIUM SERPL-MCNC: 9.2 MG/DL — SIGNIFICANT CHANGE UP (ref 8.4–10.5)
CALCIUM SERPL-MCNC: 9.2 MG/DL — SIGNIFICANT CHANGE UP (ref 8.4–10.5)
CHLORIDE SERPL-SCNC: 92 MMOL/L — LOW (ref 96–108)
CHLORIDE SERPL-SCNC: 92 MMOL/L — LOW (ref 96–108)
CO2 SERPL-SCNC: 29 MMOL/L — SIGNIFICANT CHANGE UP (ref 22–31)
CO2 SERPL-SCNC: 29 MMOL/L — SIGNIFICANT CHANGE UP (ref 22–31)
CREAT SERPL-MCNC: 0.9 MG/DL — SIGNIFICANT CHANGE UP (ref 0.5–1.3)
CREAT SERPL-MCNC: 0.9 MG/DL — SIGNIFICANT CHANGE UP (ref 0.5–1.3)
EGFR: 98 ML/MIN/1.73M2 — SIGNIFICANT CHANGE UP
EGFR: 98 ML/MIN/1.73M2 — SIGNIFICANT CHANGE UP
EOSINOPHIL # BLD AUTO: 0 K/UL — SIGNIFICANT CHANGE UP (ref 0–0.5)
EOSINOPHIL # BLD AUTO: 0 K/UL — SIGNIFICANT CHANGE UP (ref 0–0.5)
EOSINOPHIL NFR BLD AUTO: 0 % — SIGNIFICANT CHANGE UP (ref 0–6)
EOSINOPHIL NFR BLD AUTO: 0 % — SIGNIFICANT CHANGE UP (ref 0–6)
GLUCOSE BLDC GLUCOMTR-MCNC: 120 MG/DL — HIGH (ref 70–99)
GLUCOSE BLDC GLUCOMTR-MCNC: 120 MG/DL — HIGH (ref 70–99)
GLUCOSE BLDC GLUCOMTR-MCNC: 122 MG/DL — HIGH (ref 70–99)
GLUCOSE BLDC GLUCOMTR-MCNC: 122 MG/DL — HIGH (ref 70–99)
GLUCOSE BLDC GLUCOMTR-MCNC: 131 MG/DL — HIGH (ref 70–99)
GLUCOSE BLDC GLUCOMTR-MCNC: 131 MG/DL — HIGH (ref 70–99)
GLUCOSE BLDC GLUCOMTR-MCNC: 141 MG/DL — HIGH (ref 70–99)
GLUCOSE BLDC GLUCOMTR-MCNC: 141 MG/DL — HIGH (ref 70–99)
GLUCOSE BLDC GLUCOMTR-MCNC: 155 MG/DL — HIGH (ref 70–99)
GLUCOSE BLDC GLUCOMTR-MCNC: 155 MG/DL — HIGH (ref 70–99)
GLUCOSE BLDC GLUCOMTR-MCNC: 166 MG/DL — HIGH (ref 70–99)
GLUCOSE BLDC GLUCOMTR-MCNC: 166 MG/DL — HIGH (ref 70–99)
GLUCOSE BLDC GLUCOMTR-MCNC: 174 MG/DL — HIGH (ref 70–99)
GLUCOSE BLDC GLUCOMTR-MCNC: 176 MG/DL — HIGH (ref 70–99)
GLUCOSE BLDC GLUCOMTR-MCNC: 176 MG/DL — HIGH (ref 70–99)
GLUCOSE BLDC GLUCOMTR-MCNC: 182 MG/DL — HIGH (ref 70–99)
GLUCOSE BLDC GLUCOMTR-MCNC: 182 MG/DL — HIGH (ref 70–99)
GLUCOSE BLDC GLUCOMTR-MCNC: 185 MG/DL — HIGH (ref 70–99)
GLUCOSE BLDC GLUCOMTR-MCNC: 185 MG/DL — HIGH (ref 70–99)
GLUCOSE BLDC GLUCOMTR-MCNC: 193 MG/DL — HIGH (ref 70–99)
GLUCOSE BLDC GLUCOMTR-MCNC: 193 MG/DL — HIGH (ref 70–99)
GLUCOSE BLDC GLUCOMTR-MCNC: 229 MG/DL — HIGH (ref 70–99)
GLUCOSE BLDC GLUCOMTR-MCNC: 229 MG/DL — HIGH (ref 70–99)
GLUCOSE BLDC GLUCOMTR-MCNC: 308 MG/DL — HIGH (ref 70–99)
GLUCOSE BLDC GLUCOMTR-MCNC: 308 MG/DL — HIGH (ref 70–99)
GLUCOSE BLDC GLUCOMTR-MCNC: 360 MG/DL — HIGH (ref 70–99)
GLUCOSE BLDC GLUCOMTR-MCNC: 360 MG/DL — HIGH (ref 70–99)
GLUCOSE BLDC GLUCOMTR-MCNC: 534 MG/DL — CRITICAL HIGH (ref 70–99)
GLUCOSE BLDC GLUCOMTR-MCNC: 534 MG/DL — CRITICAL HIGH (ref 70–99)
GLUCOSE BLDC GLUCOMTR-MCNC: 570 MG/DL — CRITICAL HIGH (ref 70–99)
GLUCOSE BLDC GLUCOMTR-MCNC: 570 MG/DL — CRITICAL HIGH (ref 70–99)
GLUCOSE BLDC GLUCOMTR-MCNC: 89 MG/DL — SIGNIFICANT CHANGE UP (ref 70–99)
GLUCOSE BLDC GLUCOMTR-MCNC: 89 MG/DL — SIGNIFICANT CHANGE UP (ref 70–99)
GLUCOSE BLDC GLUCOMTR-MCNC: 92 MG/DL — SIGNIFICANT CHANGE UP (ref 70–99)
GLUCOSE BLDC GLUCOMTR-MCNC: 92 MG/DL — SIGNIFICANT CHANGE UP (ref 70–99)
GLUCOSE BLDC GLUCOMTR-MCNC: 99 MG/DL — SIGNIFICANT CHANGE UP (ref 70–99)
GLUCOSE BLDC GLUCOMTR-MCNC: 99 MG/DL — SIGNIFICANT CHANGE UP (ref 70–99)
GLUCOSE SERPL-MCNC: 79 MG/DL — SIGNIFICANT CHANGE UP (ref 70–99)
GLUCOSE SERPL-MCNC: 79 MG/DL — SIGNIFICANT CHANGE UP (ref 70–99)
HCT VFR BLD CALC: 27.3 % — LOW (ref 39–50)
HCT VFR BLD CALC: 27.3 % — LOW (ref 39–50)
HGB BLD-MCNC: 9.3 G/DL — LOW (ref 13–17)
HGB BLD-MCNC: 9.3 G/DL — LOW (ref 13–17)
IMM GRANULOCYTES NFR BLD AUTO: 0.7 % — SIGNIFICANT CHANGE UP (ref 0–0.9)
IMM GRANULOCYTES NFR BLD AUTO: 0.7 % — SIGNIFICANT CHANGE UP (ref 0–0.9)
LYMPHOCYTES # BLD AUTO: 1.68 K/UL — SIGNIFICANT CHANGE UP (ref 1–3.3)
LYMPHOCYTES # BLD AUTO: 1.68 K/UL — SIGNIFICANT CHANGE UP (ref 1–3.3)
LYMPHOCYTES # BLD AUTO: 16.3 % — SIGNIFICANT CHANGE UP (ref 13–44)
LYMPHOCYTES # BLD AUTO: 16.3 % — SIGNIFICANT CHANGE UP (ref 13–44)
MAGNESIUM SERPL-MCNC: 1.9 MG/DL — SIGNIFICANT CHANGE UP (ref 1.6–2.6)
MAGNESIUM SERPL-MCNC: 1.9 MG/DL — SIGNIFICANT CHANGE UP (ref 1.6–2.6)
MCHC RBC-ENTMCNC: 29.9 PG — SIGNIFICANT CHANGE UP (ref 27–34)
MCHC RBC-ENTMCNC: 29.9 PG — SIGNIFICANT CHANGE UP (ref 27–34)
MCHC RBC-ENTMCNC: 34.1 GM/DL — SIGNIFICANT CHANGE UP (ref 32–36)
MCHC RBC-ENTMCNC: 34.1 GM/DL — SIGNIFICANT CHANGE UP (ref 32–36)
MCV RBC AUTO: 87.8 FL — SIGNIFICANT CHANGE UP (ref 80–100)
MCV RBC AUTO: 87.8 FL — SIGNIFICANT CHANGE UP (ref 80–100)
MONOCYTES # BLD AUTO: 0.9 K/UL — SIGNIFICANT CHANGE UP (ref 0–0.9)
MONOCYTES # BLD AUTO: 0.9 K/UL — SIGNIFICANT CHANGE UP (ref 0–0.9)
MONOCYTES NFR BLD AUTO: 8.7 % — SIGNIFICANT CHANGE UP (ref 2–14)
MONOCYTES NFR BLD AUTO: 8.7 % — SIGNIFICANT CHANGE UP (ref 2–14)
NEUTROPHILS # BLD AUTO: 7.65 K/UL — HIGH (ref 1.8–7.4)
NEUTROPHILS # BLD AUTO: 7.65 K/UL — HIGH (ref 1.8–7.4)
NEUTROPHILS NFR BLD AUTO: 74.2 % — SIGNIFICANT CHANGE UP (ref 43–77)
NEUTROPHILS NFR BLD AUTO: 74.2 % — SIGNIFICANT CHANGE UP (ref 43–77)
NRBC # BLD: 0 /100 WBCS — SIGNIFICANT CHANGE UP (ref 0–0)
NRBC # BLD: 0 /100 WBCS — SIGNIFICANT CHANGE UP (ref 0–0)
PHOSPHATE SERPL-MCNC: 2.5 MG/DL — SIGNIFICANT CHANGE UP (ref 2.5–4.5)
PHOSPHATE SERPL-MCNC: 2.5 MG/DL — SIGNIFICANT CHANGE UP (ref 2.5–4.5)
PLATELET # BLD AUTO: 205 K/UL — SIGNIFICANT CHANGE UP (ref 150–400)
PLATELET # BLD AUTO: 205 K/UL — SIGNIFICANT CHANGE UP (ref 150–400)
POTASSIUM SERPL-MCNC: 3.9 MMOL/L — SIGNIFICANT CHANGE UP (ref 3.5–5.3)
POTASSIUM SERPL-MCNC: 3.9 MMOL/L — SIGNIFICANT CHANGE UP (ref 3.5–5.3)
POTASSIUM SERPL-SCNC: 3.9 MMOL/L — SIGNIFICANT CHANGE UP (ref 3.5–5.3)
POTASSIUM SERPL-SCNC: 3.9 MMOL/L — SIGNIFICANT CHANGE UP (ref 3.5–5.3)
PROT SERPL-MCNC: 6 G/DL — SIGNIFICANT CHANGE UP (ref 6–8.3)
PROT SERPL-MCNC: 6 G/DL — SIGNIFICANT CHANGE UP (ref 6–8.3)
RBC # BLD: 3.11 M/UL — LOW (ref 4.2–5.8)
RBC # BLD: 3.11 M/UL — LOW (ref 4.2–5.8)
RBC # FLD: 15.5 % — HIGH (ref 10.3–14.5)
RBC # FLD: 15.5 % — HIGH (ref 10.3–14.5)
SODIUM SERPL-SCNC: 130 MMOL/L — LOW (ref 135–145)
SODIUM SERPL-SCNC: 130 MMOL/L — LOW (ref 135–145)
TACROLIMUS SERPL-MCNC: 6.4 NG/ML — SIGNIFICANT CHANGE UP
TACROLIMUS SERPL-MCNC: 6.4 NG/ML — SIGNIFICANT CHANGE UP
WBC # BLD: 10.31 K/UL — SIGNIFICANT CHANGE UP (ref 3.8–10.5)
WBC # BLD: 10.31 K/UL — SIGNIFICANT CHANGE UP (ref 3.8–10.5)
WBC # FLD AUTO: 10.31 K/UL — SIGNIFICANT CHANGE UP (ref 3.8–10.5)
WBC # FLD AUTO: 10.31 K/UL — SIGNIFICANT CHANGE UP (ref 3.8–10.5)

## 2024-01-01 PROCEDURE — 99233 SBSQ HOSP IP/OBS HIGH 50: CPT

## 2024-01-01 PROCEDURE — 99233 SBSQ HOSP IP/OBS HIGH 50: CPT | Mod: GC

## 2024-01-01 PROCEDURE — 71045 X-RAY EXAM CHEST 1 VIEW: CPT | Mod: 26

## 2024-01-01 PROCEDURE — 99232 SBSQ HOSP IP/OBS MODERATE 35: CPT

## 2024-01-01 RX ORDER — DEXTROSE 50 % IN WATER 50 %
15 SYRINGE (ML) INTRAVENOUS ONCE
Refills: 0 | Status: DISCONTINUED | OUTPATIENT
Start: 2024-01-01 | End: 2024-01-09

## 2024-01-01 RX ORDER — INSULIN GLARGINE 100 [IU]/ML
20 INJECTION, SOLUTION SUBCUTANEOUS ONCE
Refills: 0 | Status: COMPLETED | OUTPATIENT
Start: 2024-01-01 | End: 2024-01-01

## 2024-01-01 RX ORDER — INSULIN GLARGINE 100 [IU]/ML
20 INJECTION, SOLUTION SUBCUTANEOUS
Refills: 0 | Status: DISCONTINUED | OUTPATIENT
Start: 2024-01-01 | End: 2024-01-01

## 2024-01-01 RX ORDER — INSULIN LISPRO 100/ML
10 VIAL (ML) SUBCUTANEOUS
Refills: 0 | Status: DISCONTINUED | OUTPATIENT
Start: 2024-01-01 | End: 2024-01-02

## 2024-01-01 RX ORDER — INSULIN GLARGINE 100 [IU]/ML
20 INJECTION, SOLUTION SUBCUTANEOUS EVERY MORNING
Refills: 0 | Status: DISCONTINUED | OUTPATIENT
Start: 2024-01-02 | End: 2024-01-02

## 2024-01-01 RX ORDER — INSULIN LISPRO 100/ML
VIAL (ML) SUBCUTANEOUS
Refills: 0 | Status: DISCONTINUED | OUTPATIENT
Start: 2024-01-01 | End: 2024-01-09

## 2024-01-01 RX ORDER — SODIUM CHLORIDE 9 MG/ML
1000 INJECTION, SOLUTION INTRAVENOUS
Refills: 0 | Status: DISCONTINUED | OUTPATIENT
Start: 2024-01-01 | End: 2024-01-09

## 2024-01-01 RX ORDER — GLUCAGON INJECTION, SOLUTION 0.5 MG/.1ML
1 INJECTION, SOLUTION SUBCUTANEOUS ONCE
Refills: 0 | Status: DISCONTINUED | OUTPATIENT
Start: 2024-01-01 | End: 2024-01-09

## 2024-01-01 RX ORDER — DOXAZOSIN MESYLATE 4 MG
2 TABLET ORAL AT BEDTIME
Refills: 0 | Status: COMPLETED | OUTPATIENT
Start: 2024-01-01 | End: 2024-01-01

## 2024-01-01 RX ORDER — HEPARIN SODIUM 5000 [USP'U]/ML
5000 INJECTION INTRAVENOUS; SUBCUTANEOUS EVERY 8 HOURS
Refills: 0 | Status: COMPLETED | OUTPATIENT
Start: 2024-01-01 | End: 2024-01-01

## 2024-01-01 RX ORDER — INSULIN GLARGINE 100 [IU]/ML
20 INJECTION, SOLUTION SUBCUTANEOUS ONCE
Refills: 0 | Status: DISCONTINUED | OUTPATIENT
Start: 2024-01-01 | End: 2024-01-01

## 2024-01-01 RX ORDER — FLUCONAZOLE 150 MG/1
100 TABLET ORAL
Refills: 0 | Status: DISCONTINUED | OUTPATIENT
Start: 2024-01-01 | End: 2024-01-09

## 2024-01-01 RX ORDER — INSULIN LISPRO 100/ML
VIAL (ML) SUBCUTANEOUS AT BEDTIME
Refills: 0 | Status: DISCONTINUED | OUTPATIENT
Start: 2024-01-01 | End: 2024-01-03

## 2024-01-01 RX ADMIN — SENNA PLUS 2 TABLET(S): 8.6 TABLET ORAL at 21:03

## 2024-01-01 RX ADMIN — Medication 100 MILLIGRAM(S): at 14:38

## 2024-01-01 RX ADMIN — BUMETANIDE 1 MILLIGRAM(S): 0.25 INJECTION INTRAMUSCULAR; INTRAVENOUS at 05:14

## 2024-01-01 RX ADMIN — Medication 1 TABLET(S): at 13:05

## 2024-01-01 RX ADMIN — Medication 60 MILLIGRAM(S): at 18:41

## 2024-01-01 RX ADMIN — Medication 2 MILLIGRAM(S): at 21:04

## 2024-01-01 RX ADMIN — HEPARIN SODIUM 5000 UNIT(S): 5000 INJECTION INTRAVENOUS; SUBCUTANEOUS at 05:17

## 2024-01-01 RX ADMIN — TACROLIMUS 6 MILLIGRAM(S): 5 CAPSULE ORAL at 19:58

## 2024-01-01 RX ADMIN — INSULIN GLARGINE 20 UNIT(S): 100 INJECTION, SOLUTION SUBCUTANEOUS at 14:54

## 2024-01-01 RX ADMIN — Medication 10 MILLIGRAM(S): at 05:13

## 2024-01-01 RX ADMIN — Medication 650 MILLIGRAM(S): at 12:55

## 2024-01-01 RX ADMIN — Medication 60 MILLIGRAM(S): at 05:24

## 2024-01-01 RX ADMIN — POLYETHYLENE GLYCOL 3350 17 GRAM(S): 17 POWDER, FOR SOLUTION ORAL at 12:55

## 2024-01-01 RX ADMIN — FLUCONAZOLE 100 MILLIGRAM(S): 150 TABLET ORAL at 13:07

## 2024-01-01 RX ADMIN — BUMETANIDE 1 MILLIGRAM(S): 0.25 INJECTION INTRAMUSCULAR; INTRAVENOUS at 12:54

## 2024-01-01 RX ADMIN — SIMETHICONE 160 MILLIGRAM(S): 80 TABLET, CHEWABLE ORAL at 05:17

## 2024-01-01 RX ADMIN — TACROLIMUS 6 MILLIGRAM(S): 5 CAPSULE ORAL at 08:41

## 2024-01-01 RX ADMIN — BUMETANIDE 1 MILLIGRAM(S): 0.25 INJECTION INTRAMUSCULAR; INTRAVENOUS at 18:41

## 2024-01-01 RX ADMIN — METHOCARBAMOL 500 MILLIGRAM(S): 500 TABLET, FILM COATED ORAL at 05:17

## 2024-01-01 RX ADMIN — Medication 50 MILLIGRAM(S): at 05:16

## 2024-01-01 RX ADMIN — Medication 10 MILLIGRAM(S): at 14:39

## 2024-01-01 RX ADMIN — Medication 50 MILLIGRAM(S): at 21:03

## 2024-01-01 RX ADMIN — NALOXEGOL OXALATE 25 MILLIGRAM(S): 12.5 TABLET, FILM COATED ORAL at 12:55

## 2024-01-01 RX ADMIN — Medication 16 MILLIGRAM(S): at 18:41

## 2024-01-01 RX ADMIN — HEPARIN SODIUM 5000 UNIT(S): 5000 INJECTION INTRAVENOUS; SUBCUTANEOUS at 14:40

## 2024-01-01 RX ADMIN — Medication 650 MILLIGRAM(S): at 00:45

## 2024-01-01 RX ADMIN — TRAMADOL HYDROCHLORIDE 25 MILLIGRAM(S): 50 TABLET ORAL at 05:16

## 2024-01-01 RX ADMIN — Medication 650 MILLIGRAM(S): at 18:41

## 2024-01-01 RX ADMIN — BUDESONIDE AND FORMOTEROL FUMARATE DIHYDRATE 1 PUFF(S): 160; 4.5 AEROSOL RESPIRATORY (INHALATION) at 18:42

## 2024-01-01 RX ADMIN — MYCOPHENOLATE MOFETIL 1000 MILLIGRAM(S): 250 CAPSULE ORAL at 19:57

## 2024-01-01 RX ADMIN — Medication 50 MILLIGRAM(S): at 14:41

## 2024-01-01 RX ADMIN — TRAMADOL HYDROCHLORIDE 25 MILLIGRAM(S): 50 TABLET ORAL at 05:46

## 2024-01-01 RX ADMIN — Medication 650 MILLIGRAM(S): at 05:46

## 2024-01-01 RX ADMIN — VALGANCICLOVIR 450 MILLIGRAM(S): 450 TABLET, FILM COATED ORAL at 08:40

## 2024-01-01 RX ADMIN — CHLORHEXIDINE GLUCONATE 1 APPLICATION(S): 213 SOLUTION TOPICAL at 10:00

## 2024-01-01 RX ADMIN — Medication 100 MILLIGRAM(S): at 05:24

## 2024-01-01 RX ADMIN — HEPARIN SODIUM 5000 UNIT(S): 5000 INJECTION INTRAVENOUS; SUBCUTANEOUS at 21:04

## 2024-01-01 RX ADMIN — Medication 20 MILLIGRAM(S): at 05:15

## 2024-01-01 RX ADMIN — Medication 650 MILLIGRAM(S): at 23:50

## 2024-01-01 RX ADMIN — MYCOPHENOLATE MOFETIL 1000 MILLIGRAM(S): 250 CAPSULE ORAL at 08:41

## 2024-01-01 RX ADMIN — Medication 650 MILLIGRAM(S): at 05:16

## 2024-01-01 RX ADMIN — BUDESONIDE AND FORMOTEROL FUMARATE DIHYDRATE 1 PUFF(S): 160; 4.5 AEROSOL RESPIRATORY (INHALATION) at 05:13

## 2024-01-01 RX ADMIN — Medication 10 MILLIGRAM(S): at 21:04

## 2024-01-01 RX ADMIN — PANTOPRAZOLE SODIUM 40 MILLIGRAM(S): 20 TABLET, DELAYED RELEASE ORAL at 13:04

## 2024-01-01 RX ADMIN — VALGANCICLOVIR 450 MILLIGRAM(S): 450 TABLET, FILM COATED ORAL at 19:57

## 2024-01-01 RX ADMIN — Medication 500000 UNIT(S): at 08:41

## 2024-01-01 NOTE — PROGRESS NOTE ADULT - ASSESSMENT
57 yo male h/o htn, cad s/p pci, ICH, here with NSTEMI  s/p intubation and cath. now in CCU    NSTEMI  s/p  cath  cath results noted. multi vessel dz., CTSx f/u.    pt with vtach/fib arrest again on 11/8. s/p ACLS and ROSC.   in ccu with iabp   plan for transplant as per HF and transplant team  transplant w/u ongoing.   s/p colonoscopy 11/17. normal  now listed for transplant as of 11/22 12/25: pt s/p heart transplant last night. remains intubated with iabp in CTU.   12/26: pt extubated to high flow this am. IABP currently being removed. pt a0x3.   12/27: pt feels well. walked with PT this am. currently comforable sitting in chair. IABP removed. wean off pressors as able.   12/28-29: no acute events o/n. weaning down ionotropes/pressors as able. immunosuppressives as per transplant team. PT  12/30: feels well. pressors/ionotropes weaned off. removal of chest tubes as able. immunosupressives as per id. PT.  12/31: feels well. tx out of ctu to step down unit. cont care as per ctsx team. PT  1/1: no acute events o/n. to transition off insulin gtt today. PT. d/w family bedside      mngt as per CTU team          Advanced care planning was discussed with patient and family.  Advanced care planning forms were reviewed and discussed as appropriate.  Differential diagnosis and plan of care discussed with patient after the evaluation.   Pain assessed and judicious use of narcotics when appropriate was discussed.  Importance of Fall prevention discussed.  Counseling on Smoking and Alcohol cessation was offered when appropriate.  Counseling on Diet, exercise, and medication compliance was done.       Approx 75 minutes spent.

## 2024-01-01 NOTE — PROGRESS NOTE ADULT - SUBJECTIVE AND OBJECTIVE BOX
LD VALENCIA  58y Male  MRN:[id number]    Patient is a 58y old  Male who presents with a chief complaint of NSTEMI (01 Nov 2023 20:29)    HPI:  59yo M w/ hx HTN, CAD w/ 1 stent in 2009, ICH (2008) presenting with abn ekg. Patient presented to [facility] where he was found to have STEMI, recommended to get cath however patient did not want to get it there so it left and came here.  Patient initially had cough, congestion, fever, was placed on antibiotics on Sunday.  Started feeling nauseous and had a presyncopal event after which he presented to ED last night.  Had chest pain as well.  Chest pain is midsternal.  Not currently having chest pain.  Received 4 aspirin 30 min pta. (01 Nov 2023 15:11)      Patient seen and evaluated at bedside. interval events noted    Interval HPI:   no acute events o/n     PAST MEDICAL & SURGICAL HISTORY:  HTN (hypertension)      CAD (coronary artery disease)  2009; stent      Intracranial hemorrhage  2008      Respiratory arrest  december 1st      Myocardial infarction, unspecified MI type, unspecified artery      History of coronary artery stent placement          REVIEW OF SYSTEMS:  as per hpi     VITALS:   Vital Signs Last 24 Hrs  T(C): 36.4 (01 Jan 2024 03:18), Max: 36.4 (31 Dec 2023 14:49)  T(F): 97.6 (01 Jan 2024 03:18), Max: 97.6 (01 Jan 2024 03:18)  HR: 104 (01 Jan 2024 12:46) (91 - 113)  BP: 130/72 (01 Jan 2024 12:21) (102/63 - 130/72)  BP(mean): 95 (01 Jan 2024 12:21) (78 - 95)  RR: 18 (01 Jan 2024 07:26) (18 - 27)  SpO2: 96% (01 Jan 2024 12:46) (94% - 98%)    Parameters below as of 01 Jan 2024 07:26  Patient On (Oxygen Delivery Method): room air              PHYSICAL EXAM:  GENERAL: NAD, comfortable.    HEAD:  Atraumatic, Normocephalic  EYES: EOMI, PERRLA, conjunctiva and sclera clear  NECK:  No JVD.    CHEST/LUNG: Clear to auscultation bilaterally; No wheeze +chest tubes  HEART: Regular rate and rhythm; No murmurs, rubs, or gallops  ABDOMEN: Soft, Nontender, Nondistended; Bowel sounds present  EXTREMITIES:  2+ Peripheral Pulses, No clubbing, cyanosis, or edema  NEUROLOGY: a0x3          Consultant(s) Notes Reviewed:  [x ] YES  [ ] NO  Care Discussed with Consultants/Other Providers [ x] YES  [ ] NO    MEDS:   MEDICATIONS  (STANDING):  acetaminophen     Tablet .. 650 milliGRAM(s) Oral every 6 hours  budesonide  80 MICROgram(s)/formoterol 4.5 MICROgram(s) Inhaler 1 Puff(s) Inhalation two times a day  buMETAnide Injectable 1 milliGRAM(s) IV Push <User Schedule>  chlorhexidine 4% Liquid 1 Application(s) Topical <User Schedule>  dextrose 5%. 1000 milliLiter(s) (50 mL/Hr) IV Continuous <Continuous>  dextrose 5%. 1000 milliLiter(s) (100 mL/Hr) IV Continuous <Continuous>  dextrose 50% Injectable 50 milliLiter(s) IV Push every 15 minutes  doxazosin 2 milliGRAM(s) Oral at bedtime  fluconAZOLE   Tablet 100 milliGRAM(s) Oral <User Schedule>  glucagon  Injectable 1 milliGRAM(s) IntraMuscular once  heparin   Injectable 5000 Unit(s) SubCutaneous every 8 hours  hydrALAZINE 100 milliGRAM(s) Oral every 8 hours  insulin glargine Injectable (LANTUS) 20 Unit(s) SubCutaneous once  insulin lispro (ADMELOG) corrective regimen sliding scale   SubCutaneous at bedtime  insulin lispro (ADMELOG) corrective regimen sliding scale   SubCutaneous three times a day before meals  insulin lispro Injectable (ADMELOG) 10 Unit(s) SubCutaneous three times a day before meals  insulin regular Infusion 3 Unit(s)/Hr (3 mL/Hr) IV Continuous <Continuous>  lidocaine   4% Patch 3 Patch Transdermal daily  magnesium citrate Oral Solution 296 milliLiter(s) Oral daily  methylPREDNISolone sodium succinate Injectable   IV Push   methylPREDNISolone sodium succinate Injectable 16 milliGRAM(s) IV Push every 12 hours  metoclopramide Injectable 10 milliGRAM(s) IV Push every 8 hours  mycophenolate mofetil 1000 milliGRAM(s) Oral every 12 hours  naloxegol 25 milliGRAM(s) Oral daily  NIFEdipine XL 60 milliGRAM(s) Oral every 12 hours  pantoprazole  Injectable 40 milliGRAM(s) IV Push daily  polyethylene glycol 3350 17 Gram(s) Oral daily  pregabalin 50 milliGRAM(s) Oral every 8 hours  senna 2 Tablet(s) Oral at bedtime  tacrolimus 6 milliGRAM(s) Oral <User Schedule>  trimethoprim   80 mG/sulfamethoxazole 400 mG 1 Tablet(s) Oral daily  valGANciclovir 450 milliGRAM(s) Oral every 12 hours    MEDICATIONS  (PRN):  dextrose Oral Gel 15 Gram(s) Oral once PRN Blood Glucose LESS THAN 70 milliGRAM(s)/deciliter          Allergies    penicillins (Unknown)    Intolerances        LABS:                                               9.3    10.31 )-----------( 205      ( 01 Jan 2024 07:29 )             27.3   01-01    130<L>  |  92<L>  |  38<H>  ----------------------------<  79  3.9   |  29  |  0.90    Ca    9.2      01 Jan 2024 07:26  Phos  2.5     01-01  Mg     1.9     01-01    TPro  6.0  /  Alb  3.8  /  TBili  0.4  /  DBili  x   /  AST  31  /  ALT  62<H>  /  AlkPhos  43  01-01      < from: CT Abdomen and Pelvis No Cont (11.28.23 @ 03:38) >  IMPRESSION:  Mildly dilated colon and prominent but not overly dilated small bowel   with air-fluid levels. No discrete transition point. Findings are   suggestive of ileus.    Intra-aortic balloon pump with the inferior marker at the level of the   inferior mesenteric artery and the balloon overlying the origins of the   renal arteries, celiac artery, and superior mesenteric artery. Consider   repositioning. Concern for IABP positioning was discussed with LANETTE Hawthorne   on 11/28/2023 at 3:42 AM by Dr. Shepard.    < end of copied text >          < from: Colonoscopy (11.17.23 @ 10:35) >                                                                                                        Impression:          - The entire examined colon is normal on direct and retroflexion views.                       - No specimens collected.  Recommendation:      - Resume previous diet today.                       - No large polyps or masses detected, No objection from GI standpoint to                 proceed with heart transplantation/advanced heart therapies.                       - Please call back should any further questions or concerns arise.    < end of copied text >     < from: Xray Chest 1 View- PORTABLE-Urgent (11.01.23 @ 07:42) >    IMPRESSION:  Clear lungs.    ---End of Report ---        < end of copied text >  < from: TTE W or WO Ultrasound Enhancing Agent (11.01.23 @ 10:23) >  _____________________________     CONCLUSIONS:      1. Left ventricular cavity is moderately dilated. Left ventricular wall thickness is normal. Left ventricular systolic function is severely decreased with an ejection fraction of 32 % by Chinchilla's method of disks. Regional wall motion abnormalities present.   2. Multiple segmental abnormalities exist. See findings.   3. There is moderate (grade 2) left ventricular diastolic dysfunction, with indeterminant filling pressure.   4. Normal right ventricular cavity size, wall thickness, and systolic function.   5. No significant valvular disease.   6. No pericardial effusion seen.   7. Compared to the transthoracic echocardiogram performed on 1/25/2017 the areas of akinesis are unchanged but there has been a decline in LV systolic function with new areas of hypokinesis.    __________________________________________________________________    < end of copied text >  < from: TTE Limited W or WO Ultrasound Enhancing Agent (11.02.23 @ 07:41) >  __________________________     CONCLUSIONS:      1. After obtaining consent, Definity ultrasound enhancing agent was given for enhanced left ventricular opacification and improved delineation of the left ventricular endocardial borders. Left ventricular systolic function is severely decreased with a calculated ejection fraction of 22 % by the Chinchilla's biplane method of disks. There is a left ventricular thrombus.   2. Findings were discussed with Litzy BOSS on 11/2/2023 at 8.49am.   3. There is a left ventricular thrombus.    _________________________________________________________________    < end of copied text >   LD VALENCIA  58y Male  MRN:[id number]    Patient is a 58y old  Male who presents with a chief complaint of NSTEMI (01 Nov 2023 20:29)    HPI:  59yo M w/ hx HTN, CAD w/ 1 stent in 2009, ICH (2008) presenting with abn ekg. Patient presented to [facility] where he was found to have STEMI, recommended to get cath however patient did not want to get it there so it left and came here.  Patient initially had cough, congestion, fever, was placed on antibiotics on Sunday.  Started feeling nauseous and had a presyncopal event after which he presented to ED last night.  Had chest pain as well.  Chest pain is midsternal.  Not currently having chest pain.  Received 4 aspirin 30 min pta. (01 Nov 2023 15:11)      Patient seen and evaluated at bedside. interval events noted    Interval HPI:   no acute events o/n     PAST MEDICAL & SURGICAL HISTORY:  HTN (hypertension)      CAD (coronary artery disease)  2009; stent      Intracranial hemorrhage  2008      Respiratory arrest  december 1st      Myocardial infarction, unspecified MI type, unspecified artery      History of coronary artery stent placement          REVIEW OF SYSTEMS:  as per hpi     VITALS:   Vital Signs Last 24 Hrs  T(C): 36.4 (01 Jan 2024 03:18), Max: 36.4 (31 Dec 2023 14:49)  T(F): 97.6 (01 Jan 2024 03:18), Max: 97.6 (01 Jan 2024 03:18)  HR: 104 (01 Jan 2024 12:46) (91 - 113)  BP: 130/72 (01 Jan 2024 12:21) (102/63 - 130/72)  BP(mean): 95 (01 Jan 2024 12:21) (78 - 95)  RR: 18 (01 Jan 2024 07:26) (18 - 27)  SpO2: 96% (01 Jan 2024 12:46) (94% - 98%)    Parameters below as of 01 Jan 2024 07:26  Patient On (Oxygen Delivery Method): room air              PHYSICAL EXAM:  GENERAL: NAD, comfortable.    HEAD:  Atraumatic, Normocephalic  EYES: EOMI, PERRLA, conjunctiva and sclera clear  NECK:  No JVD.    CHEST/LUNG: Clear to auscultation bilaterally; No wheeze +chest tubes  HEART: Regular rate and rhythm; No murmurs, rubs, or gallops  ABDOMEN: Soft, Nontender, Nondistended; Bowel sounds present  EXTREMITIES:  2+ Peripheral Pulses, No clubbing, cyanosis, or edema  NEUROLOGY: a0x3          Consultant(s) Notes Reviewed:  [x ] YES  [ ] NO  Care Discussed with Consultants/Other Providers [ x] YES  [ ] NO    MEDS:   MEDICATIONS  (STANDING):  acetaminophen     Tablet .. 650 milliGRAM(s) Oral every 6 hours  budesonide  80 MICROgram(s)/formoterol 4.5 MICROgram(s) Inhaler 1 Puff(s) Inhalation two times a day  buMETAnide Injectable 1 milliGRAM(s) IV Push <User Schedule>  chlorhexidine 4% Liquid 1 Application(s) Topical <User Schedule>  dextrose 5%. 1000 milliLiter(s) (50 mL/Hr) IV Continuous <Continuous>  dextrose 5%. 1000 milliLiter(s) (100 mL/Hr) IV Continuous <Continuous>  dextrose 50% Injectable 50 milliLiter(s) IV Push every 15 minutes  doxazosin 2 milliGRAM(s) Oral at bedtime  fluconAZOLE   Tablet 100 milliGRAM(s) Oral <User Schedule>  glucagon  Injectable 1 milliGRAM(s) IntraMuscular once  heparin   Injectable 5000 Unit(s) SubCutaneous every 8 hours  hydrALAZINE 100 milliGRAM(s) Oral every 8 hours  insulin glargine Injectable (LANTUS) 20 Unit(s) SubCutaneous once  insulin lispro (ADMELOG) corrective regimen sliding scale   SubCutaneous at bedtime  insulin lispro (ADMELOG) corrective regimen sliding scale   SubCutaneous three times a day before meals  insulin lispro Injectable (ADMELOG) 10 Unit(s) SubCutaneous three times a day before meals  insulin regular Infusion 3 Unit(s)/Hr (3 mL/Hr) IV Continuous <Continuous>  lidocaine   4% Patch 3 Patch Transdermal daily  magnesium citrate Oral Solution 296 milliLiter(s) Oral daily  methylPREDNISolone sodium succinate Injectable   IV Push   methylPREDNISolone sodium succinate Injectable 16 milliGRAM(s) IV Push every 12 hours  metoclopramide Injectable 10 milliGRAM(s) IV Push every 8 hours  mycophenolate mofetil 1000 milliGRAM(s) Oral every 12 hours  naloxegol 25 milliGRAM(s) Oral daily  NIFEdipine XL 60 milliGRAM(s) Oral every 12 hours  pantoprazole  Injectable 40 milliGRAM(s) IV Push daily  polyethylene glycol 3350 17 Gram(s) Oral daily  pregabalin 50 milliGRAM(s) Oral every 8 hours  senna 2 Tablet(s) Oral at bedtime  tacrolimus 6 milliGRAM(s) Oral <User Schedule>  trimethoprim   80 mG/sulfamethoxazole 400 mG 1 Tablet(s) Oral daily  valGANciclovir 450 milliGRAM(s) Oral every 12 hours    MEDICATIONS  (PRN):  dextrose Oral Gel 15 Gram(s) Oral once PRN Blood Glucose LESS THAN 70 milliGRAM(s)/deciliter          Allergies    penicillins (Unknown)    Intolerances        LABS:                                               9.3    10.31 )-----------( 205      ( 01 Jan 2024 07:29 )             27.3   01-01    130<L>  |  92<L>  |  38<H>  ----------------------------<  79  3.9   |  29  |  0.90    Ca    9.2      01 Jan 2024 07:26  Phos  2.5     01-01  Mg     1.9     01-01    TPro  6.0  /  Alb  3.8  /  TBili  0.4  /  DBili  x   /  AST  31  /  ALT  62<H>  /  AlkPhos  43  01-01      < from: CT Abdomen and Pelvis No Cont (11.28.23 @ 03:38) >  IMPRESSION:  Mildly dilated colon and prominent but not overly dilated small bowel   with air-fluid levels. No discrete transition point. Findings are   suggestive of ileus.    Intra-aortic balloon pump with the inferior marker at the level of the   inferior mesenteric artery and the balloon overlying the origins of the   renal arteries, celiac artery, and superior mesenteric artery. Consider   repositioning. Concern for IABP positioning was discussed with LANETTE Hawthorne   on 11/28/2023 at 3:42 AM by Dr. Shepard.    < end of copied text >          < from: Colonoscopy (11.17.23 @ 10:35) >                                                                                                        Impression:          - The entire examined colon is normal on direct and retroflexion views.                       - No specimens collected.  Recommendation:      - Resume previous diet today.                       - No large polyps or masses detected, No objection from GI standpoint to                 proceed with heart transplantation/advanced heart therapies.                       - Please call back should any further questions or concerns arise.    < end of copied text >     < from: Xray Chest 1 View- PORTABLE-Urgent (11.01.23 @ 07:42) >    IMPRESSION:  Clear lungs.    ---End of Report ---        < end of copied text >  < from: TTE W or WO Ultrasound Enhancing Agent (11.01.23 @ 10:23) >  _____________________________     CONCLUSIONS:      1. Left ventricular cavity is moderately dilated. Left ventricular wall thickness is normal. Left ventricular systolic function is severely decreased with an ejection fraction of 32 % by Chinchilla's method of disks. Regional wall motion abnormalities present.   2. Multiple segmental abnormalities exist. See findings.   3. There is moderate (grade 2) left ventricular diastolic dysfunction, with indeterminant filling pressure.   4. Normal right ventricular cavity size, wall thickness, and systolic function.   5. No significant valvular disease.   6. No pericardial effusion seen.   7. Compared to the transthoracic echocardiogram performed on 1/25/2017 the areas of akinesis are unchanged but there has been a decline in LV systolic function with new areas of hypokinesis.    __________________________________________________________________    < end of copied text >  < from: TTE Limited W or WO Ultrasound Enhancing Agent (11.02.23 @ 07:41) >  __________________________     CONCLUSIONS:      1. After obtaining consent, Definity ultrasound enhancing agent was given for enhanced left ventricular opacification and improved delineation of the left ventricular endocardial borders. Left ventricular systolic function is severely decreased with a calculated ejection fraction of 22 % by the Chinchilla's biplane method of disks. There is a left ventricular thrombus.   2. Findings were discussed with Litzy BOSS on 11/2/2023 at 8.49am.   3. There is a left ventricular thrombus.    _________________________________________________________________    < end of copied text >

## 2024-01-01 NOTE — PROGRESS NOTE ADULT - ASSESSMENT
57yo M w/ PMHx HTN, CAD s/p stent (2009), ICH (2008), ICM now s/p heart transplant on 12/25/2023. Endocrine consulted for T2DM management.  Tolerating POs with good oral intake. On Methylprednisolone taper as below. Patient with variable insulin ggt requirements, at times requiring 3-5 units/hr, other times ggt is off per review of documentation. BG .  Complex patient, high level decision making.    Steroid schedule  12/28 Methylprednisolone 32 units BID   12/29 Methylprednisolone 28 units BID  12/30 Methylprednisolone 24 units BID   12/31 Methylprednisolone 20 units BID   1/1 Methylprednisolone 16 units BID   1/2 Methylprednisolone 12 units BID     #T2DM (A1c  8.3%), uncontrolled with complications   #Steroid induced hyperglycemia   Home meds: confirmed with patient on 12/28 patient takes dapagliflozin 10mg daily, patient is NOT taking ozempic any longer (has not taken in over 4 months)    Insulin requirements variable, at times off, other times 3-5 units/hr; patient on high dose steroid taper as above    Recommendations:   - while on insulin ggt, titrate per protocol with q1h FS BG monitoring   - if primary team wishes to transition off insulin ggt today, would recommend Lantus 20 units sc daily and Admelog 10 units sc tidac (hold if not eating, adjust dose accordingly if eating < 50% of meals) with moderate correction scales for premeal and separate moderate correction scale for bedtime. Once off insulin ggt, can check FS BG levels tidac & hs.   - Please  keep hypoglycemia protocol in place   - Carb consistent diet   - please inform endocrine if any changes to steroid taper as this will impact insulin requirements   - BG goal 100 to 180mg/dl   - DISCHARGE RECOMMENDATIONS: depending on steroid regimen on discharge and insulin requirements basal-bolus regimen, TBD.  - OUT-PATIENT FOLLOW UP: Patient should follow up with PCP vs Endocrinology depending on insulin requirement (pt does not have endocrinolgist)    #HLD  - patient not on statin  - goal LDL in diabetes mellitus is <70  - outpatient fasting lipid profile     #HTN  - patient on losartan 25mg daily at home  - goal BP in diabetes mellitus is <130/80  - defer to primary team for management    recs messaged to primary team MARTHA Greenberg on MS Teams.     Gaviota Nunez DO   Attending Physician   Department of Endocrinology, Diabetes and Metabolism     If before 9AM or after 5PM, or on weekends/holidays, please call the Endocrine answering service for assistance ([phone]).  For nonurgent matters, please email [email or web address] or [email or web address]     Please note that this patient may be followed by different provider tomorrow.   59yo M w/ PMHx HTN, CAD s/p stent (2009), ICH (2008), ICM now s/p heart transplant on 12/25/2023. Endocrine consulted for T2DM management.  Tolerating POs with good oral intake. On Methylprednisolone taper as below. Patient with variable insulin ggt requirements, at times requiring 3-5 units/hr, other times ggt is off per review of documentation. BG .  Complex patient, high level decision making.    Steroid schedule  12/28 Methylprednisolone 32 units BID   12/29 Methylprednisolone 28 units BID  12/30 Methylprednisolone 24 units BID   12/31 Methylprednisolone 20 units BID   1/1 Methylprednisolone 16 units BID   1/2 Methylprednisolone 12 units BID     #T2DM (A1c  8.3%), uncontrolled with complications   #Steroid induced hyperglycemia   Home meds: confirmed with patient on 12/28 patient takes dapagliflozin 10mg daily, patient is NOT taking ozempic any longer (has not taken in over 4 months)    Insulin requirements variable, at times off, other times 3-5 units/hr; patient on high dose steroid taper as above    Recommendations:   - while on insulin ggt, titrate per protocol with q1h FS BG monitoring   - if primary team wishes to transition off insulin ggt today, would recommend Lantus 20 units sc daily and Admelog 10 units sc tidac (hold if not eating, adjust dose accordingly if eating < 50% of meals) with moderate correction scales for premeal and separate moderate correction scale for bedtime. Once off insulin ggt, can check FS BG levels tidac & hs.   - Please  keep hypoglycemia protocol in place   - Carb consistent diet   - please inform endocrine if any changes to steroid taper as this will impact insulin requirements   - BG goal 100 to 180mg/dl   - DISCHARGE RECOMMENDATIONS: depending on steroid regimen on discharge and insulin requirements basal-bolus regimen, TBD.  - OUT-PATIENT FOLLOW UP: Patient should follow up with PCP vs Endocrinology depending on insulin requirement (pt does not have endocrinolgist)    #HLD  - patient not on statin  - goal LDL in diabetes mellitus is <70  - outpatient fasting lipid profile     #HTN  - patient on losartan 25mg daily at home  - goal BP in diabetes mellitus is <130/80  - defer to primary team for management    recs messaged to primary team MARTHA Greenberg on MS Teams.     Gaviota Nunez DO   Attending Physician   Department of Endocrinology, Diabetes and Metabolism     If before 9AM or after 5PM, or on weekends/holidays, please call the Endocrine answering service for assistance ([phone]).  For nonurgent matters, please email [email or web address] or [email or web address]     Please note that this patient may be followed by different provider tomorrow.   57yo M w/ PMHx HTN, CAD s/p stent (2009), ICH (2008), ICM now s/p heart transplant on 12/25/2023. Endocrine consulted for T2DM management.  Tolerating POs with good oral intake. On Methylprednisolone taper as below. Patient with variable insulin ggt requirements, at times requiring 3-5 units/hr, other times ggt is off per review of documentation. BG .  Complex patient, high level decision making.    Steroid schedule  12/28 Methylprednisolone 32 units BID   12/29 Methylprednisolone 28 units BID  12/30 Methylprednisolone 24 units BID   12/31 Methylprednisolone 20 units BID   1/1 Methylprednisolone 16 units BID   1/2 Methylprednisolone 12 units BID     #T2DM (A1c  8.3%), uncontrolled with complications   #Steroid induced hyperglycemia   Home meds: confirmed with patient on 12/28 patient takes dapagliflozin 10mg daily, patient is NOT taking ozempic any longer (has not taken in over 4 months)    Insulin requirements variable, at times off, other times 3-5 units/hr; patient on high dose steroid taper as above  1/1 - tightly controlled bg levels while on insulin ggt this morning, ggt turned off by team. Patient had 2 breakfast trays this morning and 1pm  and 570 on repeat.   Recommendations:   - recommend to resume insulin ggt given severe hyperglycemia to 500s at this time, hold off on transition to basal/bolus now   - q1hour FS BG monitoring   - recommend Nutrition consult   - extensively discussed importance of glycemic control to prevent post op complications as well as long term prevention of micro- and macrovascular complications  - discussed importance of changing diet   - Please  keep hypoglycemia protocol in place   - Carb consistent diet   - please inform endocrine if any changes to steroid taper as this will impact insulin requirements   - BG goal 100 to 180mg/dl   - DISCHARGE RECOMMENDATIONS: depending on steroid regimen on discharge and insulin requirements basal-bolus regimen, TBD.  - OUT-PATIENT FOLLOW UP: Patient should follow up with PCP vs Endocrinology depending on insulin requirement (pt does not have endocrinolgist)    #HLD  - patient not on statin  - goal LDL in diabetes mellitus is <70  - outpatient fasting lipid profile     #HTN  - patient on losartan 25mg daily at home  - goal BP in diabetes mellitus is <130/80  - defer to primary team for management    recs discussed with primary team ACP Nayely Nunez DO   Attending Physician   Department of Endocrinology, Diabetes and Metabolism     If before 9AM or after 5PM, or on weekends/holidays, please call the Endocrine answering service for assistance ([phone]).  For nonurgent matters, please email [email or web address] or [email or web address]     Please note that this patient may be followed by different provider tomorrow.   59yo M w/ PMHx HTN, CAD s/p stent (2009), ICH (2008), ICM now s/p heart transplant on 12/25/2023. Endocrine consulted for T2DM management.  Tolerating POs with good oral intake. On Methylprednisolone taper as below. Patient with variable insulin ggt requirements, at times requiring 3-5 units/hr, other times ggt is off per review of documentation. BG .  Complex patient, high level decision making.    Steroid schedule  12/28 Methylprednisolone 32 units BID   12/29 Methylprednisolone 28 units BID  12/30 Methylprednisolone 24 units BID   12/31 Methylprednisolone 20 units BID   1/1 Methylprednisolone 16 units BID   1/2 Methylprednisolone 12 units BID     #T2DM (A1c  8.3%), uncontrolled with complications   #Steroid induced hyperglycemia   Home meds: confirmed with patient on 12/28 patient takes dapagliflozin 10mg daily, patient is NOT taking ozempic any longer (has not taken in over 4 months)    Insulin requirements variable, at times off, other times 3-5 units/hr; patient on high dose steroid taper as above  1/1 - tightly controlled bg levels while on insulin ggt this morning, ggt turned off by team. Patient had 2 breakfast trays this morning and 1pm  and 570 on repeat.   Recommendations:   - recommend to resume insulin ggt given severe hyperglycemia to 500s at this time, hold off on transition to basal/bolus now   - q1hour FS BG monitoring   - recommend Nutrition consult   - extensively discussed importance of glycemic control to prevent post op complications as well as long term prevention of micro- and macrovascular complications  - discussed importance of changing diet   - Please  keep hypoglycemia protocol in place   - Carb consistent diet   - please inform endocrine if any changes to steroid taper as this will impact insulin requirements   - BG goal 100 to 180mg/dl   - DISCHARGE RECOMMENDATIONS: depending on steroid regimen on discharge and insulin requirements basal-bolus regimen, TBD.  - OUT-PATIENT FOLLOW UP: Patient should follow up with PCP vs Endocrinology depending on insulin requirement (pt does not have endocrinolgist)    #HLD  - patient not on statin  - goal LDL in diabetes mellitus is <70  - outpatient fasting lipid profile     #HTN  - patient on losartan 25mg daily at home  - goal BP in diabetes mellitus is <130/80  - defer to primary team for management    recs discussed with primary team ACP Nayely Nunez DO   Attending Physician   Department of Endocrinology, Diabetes and Metabolism     If before 9AM or after 5PM, or on weekends/holidays, please call the Endocrine answering service for assistance ([phone]).  For nonurgent matters, please email [email or web address] or [email or web address]     Please note that this patient may be followed by different provider tomorrow.

## 2024-01-01 NOTE — PROGRESS NOTE ADULT - SUBJECTIVE AND OBJECTIVE BOX
Subjective:  No acute events. Transferred to Stepdown 12/31.     Medications:  acetaminophen     Tablet .. 650 milliGRAM(s) Oral every 6 hours  budesonide  80 MICROgram(s)/formoterol 4.5 MICROgram(s) Inhaler 1 Puff(s) Inhalation two times a day  buMETAnide Injectable 1 milliGRAM(s) IV Push <User Schedule>  chlorhexidine 4% Liquid 1 Application(s) Topical <User Schedule>  dextrose 50% Injectable 50 milliLiter(s) IV Push every 15 minutes  heparin   Injectable 5000 Unit(s) SubCutaneous every 8 hours  hydrALAZINE 100 milliGRAM(s) Oral every 8 hours  insulin regular Infusion 3 Unit(s)/Hr IV Continuous <Continuous>  lidocaine   4% Patch 3 Patch Transdermal daily  magnesium citrate Oral Solution 296 milliLiter(s) Oral daily  methylPREDNISolone sodium succinate Injectable 16 milliGRAM(s) IV Push every 12 hours  methylPREDNISolone sodium succinate Injectable   IV Push   metoclopramide Injectable 10 milliGRAM(s) IV Push every 8 hours  mycophenolate mofetil 1000 milliGRAM(s) Oral every 12 hours  naloxegol 25 milliGRAM(s) Oral daily  NIFEdipine XL 60 milliGRAM(s) Oral every 12 hours  nystatin    Suspension 576397 Unit(s) Oral <User Schedule>  pantoprazole  Injectable 40 milliGRAM(s) IV Push daily  polyethylene glycol 3350 17 Gram(s) Oral daily  pregabalin 50 milliGRAM(s) Oral every 8 hours  senna 2 Tablet(s) Oral at bedtime  tacrolimus 6 milliGRAM(s) Oral <User Schedule>  trimethoprim   80 mG/sulfamethoxazole 400 mG 1 Tablet(s) Oral daily  valGANciclovir 450 milliGRAM(s) Oral every 12 hours    Vitals:  T(C): 36.4 (01-01-24 @ 03:18), Max: 36.6 (12-31-23 @ 12:00)  HR: 95 (01-01-24 @ 07:26) (91 - 110)  BP: 102/63 (12-31-23 @ 21:30) (102/63 - 127/65)  BP(mean): 78 (12-31-23 @ 21:30) (78 - 90)  ABP: 123/68 (01-01-24 @ 07:26) (104/56 - 151/63)  ABP(mean): 86 (01-01-24 @ 07:26) (64 - 88)  RR: 18 (01-01-24 @ 07:26) (18 - 27)  SpO2: 96% (01-01-24 @ 07:26) (94% - 99%)      I&O's Summary    31 Dec 2023 07:01  -  01 Jan 2024 07:00  --------------------------------------------------------  IN: 1327 mL / OUT: 3795 mL / NET: -2468 mL    01 Jan 2024 07:01  -  01 Jan 2024 08:51  --------------------------------------------------------  IN: 0 mL / OUT: 900 mL / NET: -900 mL        Physical Exam:  General: No distress. Comfortable in chair  HEENT: EOM intact.  Neck: Neck supple. JVP 8-10 cm H2O  Chest: Clear to auscultation bilaterally  CV: Normal S1 and S2. No murmurs, rub, or gallops. Radial pulses normal.  Abdomen: Soft, non-distended, non-tender  Extremities: Warm peripherally, no edema   Neurology: Alert and oriented times three. Sensation intact  Psych: Affect normal      Labs:                        9.3    10.31 )-----------( 205      ( 01 Jan 2024 07:29 )             27.3     01-01    130<L>  |  92<L>  |  38<H>  ----------------------------<  79  3.9   |  29  |  0.90    Ca    9.2      01 Jan 2024 07:26  Phos  2.5     01-01  Mg     1.9     01-01    TPro  6.0  /  Alb  3.8  /  TBili  0.4  /  DBili  x   /  AST  31  /  ALT  62<H>  /  AlkPhos  43  01-01        Tacrolimus (), Serum: 8.2 ng/mL (12-31-23 @ 06:52)  Tacrolimus (), Serum: 8.1 ng/mL (12-30-23 @ 06:45)  Tacrolimus (), Serum: 10.7 ng/mL (12-29-23 @ 06:25)  Tacrolimus (), Serum: 2.6 ng/mL (12-28-23 @ 07:20)  Tacrolimus (), Serum: <0.8 ng/mL (12-27-23 @ 06:44)     Subjective:  No acute events. Transferred to Stepdown 12/31.     Medications:  acetaminophen     Tablet .. 650 milliGRAM(s) Oral every 6 hours  budesonide  80 MICROgram(s)/formoterol 4.5 MICROgram(s) Inhaler 1 Puff(s) Inhalation two times a day  buMETAnide Injectable 1 milliGRAM(s) IV Push <User Schedule>  chlorhexidine 4% Liquid 1 Application(s) Topical <User Schedule>  dextrose 50% Injectable 50 milliLiter(s) IV Push every 15 minutes  heparin   Injectable 5000 Unit(s) SubCutaneous every 8 hours  hydrALAZINE 100 milliGRAM(s) Oral every 8 hours  insulin regular Infusion 3 Unit(s)/Hr IV Continuous <Continuous>  lidocaine   4% Patch 3 Patch Transdermal daily  magnesium citrate Oral Solution 296 milliLiter(s) Oral daily  methylPREDNISolone sodium succinate Injectable 16 milliGRAM(s) IV Push every 12 hours  methylPREDNISolone sodium succinate Injectable   IV Push   metoclopramide Injectable 10 milliGRAM(s) IV Push every 8 hours  mycophenolate mofetil 1000 milliGRAM(s) Oral every 12 hours  naloxegol 25 milliGRAM(s) Oral daily  NIFEdipine XL 60 milliGRAM(s) Oral every 12 hours  nystatin    Suspension 734844 Unit(s) Oral <User Schedule>  pantoprazole  Injectable 40 milliGRAM(s) IV Push daily  polyethylene glycol 3350 17 Gram(s) Oral daily  pregabalin 50 milliGRAM(s) Oral every 8 hours  senna 2 Tablet(s) Oral at bedtime  tacrolimus 6 milliGRAM(s) Oral <User Schedule>  trimethoprim   80 mG/sulfamethoxazole 400 mG 1 Tablet(s) Oral daily  valGANciclovir 450 milliGRAM(s) Oral every 12 hours    Vitals:  T(C): 36.4 (01-01-24 @ 03:18), Max: 36.6 (12-31-23 @ 12:00)  HR: 95 (01-01-24 @ 07:26) (91 - 110)  BP: 102/63 (12-31-23 @ 21:30) (102/63 - 127/65)  BP(mean): 78 (12-31-23 @ 21:30) (78 - 90)  ABP: 123/68 (01-01-24 @ 07:26) (104/56 - 151/63)  ABP(mean): 86 (01-01-24 @ 07:26) (64 - 88)  RR: 18 (01-01-24 @ 07:26) (18 - 27)  SpO2: 96% (01-01-24 @ 07:26) (94% - 99%)      I&O's Summary    31 Dec 2023 07:01  -  01 Jan 2024 07:00  --------------------------------------------------------  IN: 1327 mL / OUT: 3795 mL / NET: -2468 mL    01 Jan 2024 07:01  -  01 Jan 2024 08:51  --------------------------------------------------------  IN: 0 mL / OUT: 900 mL / NET: -900 mL        Physical Exam:  General: No distress. Comfortable in chair  HEENT: EOM intact.  Neck: Neck supple. JVP 8-10 cm H2O  Chest: Clear to auscultation bilaterally  CV: Normal S1 and S2. No murmurs, rub, or gallops. Radial pulses normal.  Abdomen: Soft, non-distended, non-tender  Extremities: Warm peripherally, no edema   Neurology: Alert and oriented times three. Sensation intact  Psych: Affect normal      Labs:                        9.3    10.31 )-----------( 205      ( 01 Jan 2024 07:29 )             27.3     01-01    130<L>  |  92<L>  |  38<H>  ----------------------------<  79  3.9   |  29  |  0.90    Ca    9.2      01 Jan 2024 07:26  Phos  2.5     01-01  Mg     1.9     01-01    TPro  6.0  /  Alb  3.8  /  TBili  0.4  /  DBili  x   /  AST  31  /  ALT  62<H>  /  AlkPhos  43  01-01        Tacrolimus (), Serum: 8.2 ng/mL (12-31-23 @ 06:52)  Tacrolimus (), Serum: 8.1 ng/mL (12-30-23 @ 06:45)  Tacrolimus (), Serum: 10.7 ng/mL (12-29-23 @ 06:25)  Tacrolimus (), Serum: 2.6 ng/mL (12-28-23 @ 07:20)  Tacrolimus (), Serum: <0.8 ng/mL (12-27-23 @ 06:44)

## 2024-01-01 NOTE — PROGRESS NOTE ADULT - PROBLEM SELECTOR PLAN 1
- s/p OHT on 12/25. Ischemic Time: 253min  - IABP removed 12/26  - Sabrina and Epi weaned off 12/26  - Milrinone off  - Procardia XL 60 mg BID  - Target net negative 1-2L balance. Bumex 1mg IV TID  - 1st RHC/EMBx on Tuesday 1/2  - Please keep primary Dr. Banuelos [phone] in the loop per family request. - s/p OHT on 12/25. Ischemic Time: 253min  - IABP removed 12/26  - Sabrina and Epi weaned off 12/26  - Milrinone off  - Procardia XL 60 mg BID  - Target net negative 1-2L balance. Bumex 1mg IV TID  - 1st RHC/EMBx on Tuesday 1/2  - Please keep primary Dr. Banuelos [phone] in the loop per family request. - s/p OHT on 12/25. Ischemic Time: 253min  - IABP removed 12/26  - Sabrina and Epi weaned off 12/26  - Milrinone off  - Procardia XL 60 mg BID  - Target net negative 1-2L balance. Bumex 1mg IV TID  - 1st RHC/EMBx on Tuesday 1/2  - Cardura 2mg tonight. Hold Hydral tonight   - Remove A line  - Hold Hepatin gtt at midnight for biopsy tomorrow   - Please keep primary Dr. Banuelos [phone] in the loop per family request. - s/p OHT on 12/25. Ischemic Time: 253min  - IABP removed 12/26  - Sabrina and Epi weaned off 12/26  - Milrinone off  - Procardia XL 60 mg BID  - Target net negative 1-2L balance. Bumex 1mg IV TID  - 1st RHC/EMBx on Tuesday 1/2  - Cardura 2mg tonight. Hold Hydral tonight   - Remove A line  - Hold Hepatin gtt at midnight for biopsy tomorrow   - Please keep primary Dr. Banuelos [phone] in the loop per family request.

## 2024-01-01 NOTE — PROGRESS NOTE ADULT - ATTENDING COMMENTS
ABO: A, listed status 2 PAR 0%  s/p OHT 12/25, DBD donor, IST: 253mins,   IABP removed 12/26, Sabrina weaned off and epi weaned off   weaned milrinone to off 12/30  cont nifedipine 60mg BID  will add cardura 2mg --> increase 4mg   stop hydral   diuretics bumex 1mg IV BID to keep Net neg 1lit  TTE with normal BiV function    Plan for Biopsy tomorrow am.   hold sq heparin tonight  NPO after MN    IS: s/p simulect 12/25 , next dose12/29   tacro 6mg po BID, daily monrning level  add flucanozole to boost tacro level  cont cellcept and solumedrol stacie    OI: CMV -/+ and toxo -/-  cont valcyte and bactrim  on fluconazole    Blood Sugar management  cont insulin  endo consult    tolerating PO, bowel regimen   IS, OOB to chair and ambulate  DVT/GI PPX  advance po diet as tolerated  GI PPX  feed as tolerated

## 2024-01-01 NOTE — PROGRESS NOTE ADULT - PROBLEM SELECTOR PLAN 3
- CMV -/+: intermediate risk.  On Valcyte   - Toxo -/-: none required  - PJP ppx: on bactrim  - Trush ppx: continue Nystatin S/S q6 hours   - donor HCV TIM - but HCV antibody +.

## 2024-01-01 NOTE — PROGRESS NOTE ADULT - SUBJECTIVE AND OBJECTIVE BOX
ENDOCRINE FOLLOW UP     Chief Complaint: steroid induced hyperglycemia     History:     MEDICATIONS  (STANDING):  acetaminophen     Tablet .. 650 milliGRAM(s) Oral every 6 hours  budesonide  80 MICROgram(s)/formoterol 4.5 MICROgram(s) Inhaler 1 Puff(s) Inhalation two times a day  buMETAnide Injectable 1 milliGRAM(s) IV Push <User Schedule>  chlorhexidine 4% Liquid 1 Application(s) Topical <User Schedule>  dextrose 50% Injectable 50 milliLiter(s) IV Push every 15 minutes  heparin   Injectable 5000 Unit(s) SubCutaneous every 8 hours  hydrALAZINE 100 milliGRAM(s) Oral every 8 hours  insulin lispro (ADMELOG) corrective regimen sliding scale   SubCutaneous at bedtime  insulin lispro (ADMELOG) corrective regimen sliding scale   SubCutaneous three times a day before meals  insulin regular Infusion 3 Unit(s)/Hr (3 mL/Hr) IV Continuous <Continuous>  lidocaine   4% Patch 3 Patch Transdermal daily  magnesium citrate Oral Solution 296 milliLiter(s) Oral daily  methylPREDNISolone sodium succinate Injectable 16 milliGRAM(s) IV Push every 12 hours  methylPREDNISolone sodium succinate Injectable   IV Push   metoclopramide Injectable 10 milliGRAM(s) IV Push every 8 hours  mycophenolate mofetil 1000 milliGRAM(s) Oral every 12 hours  naloxegol 25 milliGRAM(s) Oral daily  NIFEdipine XL 60 milliGRAM(s) Oral every 12 hours  nystatin    Suspension 042339 Unit(s) Oral <User Schedule>  pantoprazole  Injectable 40 milliGRAM(s) IV Push daily  polyethylene glycol 3350 17 Gram(s) Oral daily  pregabalin 50 milliGRAM(s) Oral every 8 hours  senna 2 Tablet(s) Oral at bedtime  tacrolimus 6 milliGRAM(s) Oral <User Schedule>  trimethoprim   80 mG/sulfamethoxazole 400 mG 1 Tablet(s) Oral daily  valGANciclovir 450 milliGRAM(s) Oral every 12 hours    MEDICATIONS  (PRN):      Allergies    penicillins (Unknown)    Intolerances        ROS: All other systems reviewed and negative    PHYSICAL EXAM:  VITALS: T(C): 36.4 (01-01-24 @ 03:18)  T(F): 97.6 (01-01-24 @ 03:18), Max: 97.8 (12-31-23 @ 12:00)  HR: 95 (01-01-24 @ 07:26) (91 - 110)  BP: 102/63 (12-31-23 @ 21:30) (102/63 - 127/65)  RR:  (18 - 27)  SpO2:  (94% - 99%)  Wt(kg): --  GENERAL: NAD, resting comfortably   EYES: No proptosis,  anicteric  HEENT:  Atraumatic, Normocephalic, moist mucous membranes  RESPIRATORY: Nonlabored respirations on room air, normal rate/effort   CARDIOVASCULAR: Regular rate and rhythm; No murmurs  GI: Soft, nontender, non distended, normal bowel sounds  NEURO: AOx3, moves all extremities spontaenuously   PSYCH:  reactive affect, euthymic mood    POCT Blood Glucose.: 185 mg/dL (01-01-24 @ 08:10)  POCT Blood Glucose.: 122 mg/dL (01-01-24 @ 06:18)  POCT Blood Glucose.: 92 mg/dL (01-01-24 @ 05:22)  POCT Blood Glucose.: 89 mg/dL (01-01-24 @ 04:35)  POCT Blood Glucose.: 120 mg/dL (01-01-24 @ 03:13)  POCT Blood Glucose.: 141 mg/dL (01-01-24 @ 02:11)  POCT Blood Glucose.: 166 mg/dL (01-01-24 @ 01:20)  POCT Blood Glucose.: 193 mg/dL (01-01-24 @ 00:23)  POCT Blood Glucose.: 249 mg/dL (12-31-23 @ 22:52)  POCT Blood Glucose.: 203 mg/dL (12-31-23 @ 21:31)  POCT Blood Glucose.: 103 mg/dL (12-31-23 @ 20:28)  POCT Blood Glucose.: 67 mg/dL (12-31-23 @ 20:04)  POCT Blood Glucose.: 104 mg/dL (12-31-23 @ 19:04)  POCT Blood Glucose.: 104 mg/dL (12-31-23 @ 18:06)  POCT Blood Glucose.: 129 mg/dL (12-31-23 @ 17:12)  POCT Blood Glucose.: 202 mg/dL (12-31-23 @ 16:08)  POCT Blood Glucose.: 201 mg/dL (12-31-23 @ 14:58)  POCT Blood Glucose.: 158 mg/dL (12-31-23 @ 13:42)  POCT Blood Glucose.: 84 mg/dL (12-31-23 @ 12:41)  POCT Blood Glucose.: 110 mg/dL (12-31-23 @ 12:08)  POCT Blood Glucose.: 119 mg/dL (12-31-23 @ 11:13)  POCT Blood Glucose.: 130 mg/dL (12-31-23 @ 10:15)  POCT Blood Glucose.: 190 mg/dL (12-31-23 @ 09:19)  POCT Blood Glucose.: 160 mg/dL (12-31-23 @ 08:19)  POCT Blood Glucose.: 160 mg/dL (12-31-23 @ 06:39)  POCT Blood Glucose.: 137 mg/dL (12-31-23 @ 05:53)  POCT Blood Glucose.: 146 mg/dL (12-31-23 @ 04:50)  POCT Blood Glucose.: 162 mg/dL (12-31-23 @ 03:57)  POCT Blood Glucose.: 107 mg/dL (12-31-23 @ 01:55)  POCT Blood Glucose.: 114 mg/dL (12-31-23 @ 01:02)  POCT Blood Glucose.: 119 mg/dL (12-30-23 @ 23:58)  POCT Blood Glucose.: 126 mg/dL (12-30-23 @ 22:58)  POCT Blood Glucose.: 143 mg/dL (12-30-23 @ 21:50)  POCT Blood Glucose.: 165 mg/dL (12-30-23 @ 20:56)  POCT Blood Glucose.: 181 mg/dL (12-30-23 @ 19:49)  POCT Blood Glucose.: 179 mg/dL (12-30-23 @ 18:42)  POCT Blood Glucose.: 197 mg/dL (12-30-23 @ 18:04)  POCT Blood Glucose.: 244 mg/dL (12-30-23 @ 17:30)  POCT Blood Glucose.: 165 mg/dL (12-30-23 @ 16:00)  POCT Blood Glucose.: 106 mg/dL (12-30-23 @ 14:54)  POCT Blood Glucose.: 119 mg/dL (12-30-23 @ 13:54)  POCT Blood Glucose.: 125 mg/dL (12-30-23 @ 13:29)  POCT Blood Glucose.: 161 mg/dL (12-30-23 @ 12:16)  POCT Blood Glucose.: 238 mg/dL (12-30-23 @ 11:20)  POCT Blood Glucose.: 237 mg/dL (12-30-23 @ 09:46)  POCT Blood Glucose.: 232 mg/dL (12-30-23 @ 08:56)  POCT Blood Glucose.: 214 mg/dL (12-30-23 @ 07:46)  POCT Blood Glucose.: 228 mg/dL (12-30-23 @ 06:39)  POCT Blood Glucose.: 210 mg/dL (12-30-23 @ 06:10)  POCT Blood Glucose.: 133 mg/dL (12-30-23 @ 04:51)  POCT Blood Glucose.: 92 mg/dL (12-30-23 @ 04:03)  POCT Blood Glucose.: 108 mg/dL (12-30-23 @ 02:56)  POCT Blood Glucose.: 123 mg/dL (12-30-23 @ 02:03)  POCT Blood Glucose.: 150 mg/dL (12-30-23 @ 00:59)  POCT Blood Glucose.: 169 mg/dL (12-29-23 @ 23:58)  POCT Blood Glucose.: 198 mg/dL (12-29-23 @ 22:48)  POCT Blood Glucose.: 230 mg/dL (12-29-23 @ 22:00)  POCT Blood Glucose.: 278 mg/dL (12-29-23 @ 21:09)  POCT Blood Glucose.: 241 mg/dL (12-29-23 @ 19:49)  POCT Blood Glucose.: 172 mg/dL (12-29-23 @ 18:51)  POCT Blood Glucose.: 149 mg/dL (12-29-23 @ 17:50)  POCT Blood Glucose.: 184 mg/dL (12-29-23 @ 15:50)  POCT Blood Glucose.: 244 mg/dL (12-29-23 @ 14:58)  POCT Blood Glucose.: 329 mg/dL (12-29-23 @ 13:44)  POCT Blood Glucose.: 256 mg/dL (12-29-23 @ 11:51)  POCT Blood Glucose.: 199 mg/dL (12-29-23 @ 10:06)      eMAR:  methylPREDNISolone sodium succinate Injectable   20 milliGRAM(s) IV Push (01-01-24 @ 05:15)   20 milliGRAM(s) IV Push (12-31-23 @ 17:56)      01-01    130<L>  |  92<L>  |  38<H>  ----------------------------<  79  3.9   |  29  |  0.90    eGFR: 98    Ca    9.2      01-01  Mg     1.9     01-01  Phos  2.5     01-01    TPro  6.0  /  Alb  3.8  /  TBili  0.4  /  DBili  x   /  AST  31  /  ALT  62<H>  /  AlkPhos  43  01-01      A1C with Estimated Average Glucose Result: 8.3 % (11-01-23 @ 06:14)       ENDOCRINE FOLLOW UP     Chief Complaint: steroid induced hyperglycemia     History:     MEDICATIONS  (STANDING):  acetaminophen     Tablet .. 650 milliGRAM(s) Oral every 6 hours  budesonide  80 MICROgram(s)/formoterol 4.5 MICROgram(s) Inhaler 1 Puff(s) Inhalation two times a day  buMETAnide Injectable 1 milliGRAM(s) IV Push <User Schedule>  chlorhexidine 4% Liquid 1 Application(s) Topical <User Schedule>  dextrose 50% Injectable 50 milliLiter(s) IV Push every 15 minutes  heparin   Injectable 5000 Unit(s) SubCutaneous every 8 hours  hydrALAZINE 100 milliGRAM(s) Oral every 8 hours  insulin lispro (ADMELOG) corrective regimen sliding scale   SubCutaneous at bedtime  insulin lispro (ADMELOG) corrective regimen sliding scale   SubCutaneous three times a day before meals  insulin regular Infusion 3 Unit(s)/Hr (3 mL/Hr) IV Continuous <Continuous>  lidocaine   4% Patch 3 Patch Transdermal daily  magnesium citrate Oral Solution 296 milliLiter(s) Oral daily  methylPREDNISolone sodium succinate Injectable 16 milliGRAM(s) IV Push every 12 hours  methylPREDNISolone sodium succinate Injectable   IV Push   metoclopramide Injectable 10 milliGRAM(s) IV Push every 8 hours  mycophenolate mofetil 1000 milliGRAM(s) Oral every 12 hours  naloxegol 25 milliGRAM(s) Oral daily  NIFEdipine XL 60 milliGRAM(s) Oral every 12 hours  nystatin    Suspension 636765 Unit(s) Oral <User Schedule>  pantoprazole  Injectable 40 milliGRAM(s) IV Push daily  polyethylene glycol 3350 17 Gram(s) Oral daily  pregabalin 50 milliGRAM(s) Oral every 8 hours  senna 2 Tablet(s) Oral at bedtime  tacrolimus 6 milliGRAM(s) Oral <User Schedule>  trimethoprim   80 mG/sulfamethoxazole 400 mG 1 Tablet(s) Oral daily  valGANciclovir 450 milliGRAM(s) Oral every 12 hours    MEDICATIONS  (PRN):      Allergies    penicillins (Unknown)    Intolerances        ROS: All other systems reviewed and negative    PHYSICAL EXAM:  VITALS: T(C): 36.4 (01-01-24 @ 03:18)  T(F): 97.6 (01-01-24 @ 03:18), Max: 97.8 (12-31-23 @ 12:00)  HR: 95 (01-01-24 @ 07:26) (91 - 110)  BP: 102/63 (12-31-23 @ 21:30) (102/63 - 127/65)  RR:  (18 - 27)  SpO2:  (94% - 99%)  Wt(kg): --  GENERAL: NAD, resting comfortably   EYES: No proptosis,  anicteric  HEENT:  Atraumatic, Normocephalic, moist mucous membranes  RESPIRATORY: Nonlabored respirations on room air, normal rate/effort   CARDIOVASCULAR: Regular rate and rhythm; No murmurs  GI: Soft, nontender, non distended, normal bowel sounds  NEURO: AOx3, moves all extremities spontaenuously   PSYCH:  reactive affect, euthymic mood    POCT Blood Glucose.: 185 mg/dL (01-01-24 @ 08:10)  POCT Blood Glucose.: 122 mg/dL (01-01-24 @ 06:18)  POCT Blood Glucose.: 92 mg/dL (01-01-24 @ 05:22)  POCT Blood Glucose.: 89 mg/dL (01-01-24 @ 04:35)  POCT Blood Glucose.: 120 mg/dL (01-01-24 @ 03:13)  POCT Blood Glucose.: 141 mg/dL (01-01-24 @ 02:11)  POCT Blood Glucose.: 166 mg/dL (01-01-24 @ 01:20)  POCT Blood Glucose.: 193 mg/dL (01-01-24 @ 00:23)  POCT Blood Glucose.: 249 mg/dL (12-31-23 @ 22:52)  POCT Blood Glucose.: 203 mg/dL (12-31-23 @ 21:31)  POCT Blood Glucose.: 103 mg/dL (12-31-23 @ 20:28)  POCT Blood Glucose.: 67 mg/dL (12-31-23 @ 20:04)  POCT Blood Glucose.: 104 mg/dL (12-31-23 @ 19:04)  POCT Blood Glucose.: 104 mg/dL (12-31-23 @ 18:06)  POCT Blood Glucose.: 129 mg/dL (12-31-23 @ 17:12)  POCT Blood Glucose.: 202 mg/dL (12-31-23 @ 16:08)  POCT Blood Glucose.: 201 mg/dL (12-31-23 @ 14:58)  POCT Blood Glucose.: 158 mg/dL (12-31-23 @ 13:42)  POCT Blood Glucose.: 84 mg/dL (12-31-23 @ 12:41)  POCT Blood Glucose.: 110 mg/dL (12-31-23 @ 12:08)  POCT Blood Glucose.: 119 mg/dL (12-31-23 @ 11:13)  POCT Blood Glucose.: 130 mg/dL (12-31-23 @ 10:15)  POCT Blood Glucose.: 190 mg/dL (12-31-23 @ 09:19)  POCT Blood Glucose.: 160 mg/dL (12-31-23 @ 08:19)  POCT Blood Glucose.: 160 mg/dL (12-31-23 @ 06:39)  POCT Blood Glucose.: 137 mg/dL (12-31-23 @ 05:53)  POCT Blood Glucose.: 146 mg/dL (12-31-23 @ 04:50)  POCT Blood Glucose.: 162 mg/dL (12-31-23 @ 03:57)  POCT Blood Glucose.: 107 mg/dL (12-31-23 @ 01:55)  POCT Blood Glucose.: 114 mg/dL (12-31-23 @ 01:02)  POCT Blood Glucose.: 119 mg/dL (12-30-23 @ 23:58)  POCT Blood Glucose.: 126 mg/dL (12-30-23 @ 22:58)  POCT Blood Glucose.: 143 mg/dL (12-30-23 @ 21:50)  POCT Blood Glucose.: 165 mg/dL (12-30-23 @ 20:56)  POCT Blood Glucose.: 181 mg/dL (12-30-23 @ 19:49)  POCT Blood Glucose.: 179 mg/dL (12-30-23 @ 18:42)  POCT Blood Glucose.: 197 mg/dL (12-30-23 @ 18:04)  POCT Blood Glucose.: 244 mg/dL (12-30-23 @ 17:30)  POCT Blood Glucose.: 165 mg/dL (12-30-23 @ 16:00)  POCT Blood Glucose.: 106 mg/dL (12-30-23 @ 14:54)  POCT Blood Glucose.: 119 mg/dL (12-30-23 @ 13:54)  POCT Blood Glucose.: 125 mg/dL (12-30-23 @ 13:29)  POCT Blood Glucose.: 161 mg/dL (12-30-23 @ 12:16)  POCT Blood Glucose.: 238 mg/dL (12-30-23 @ 11:20)  POCT Blood Glucose.: 237 mg/dL (12-30-23 @ 09:46)  POCT Blood Glucose.: 232 mg/dL (12-30-23 @ 08:56)  POCT Blood Glucose.: 214 mg/dL (12-30-23 @ 07:46)  POCT Blood Glucose.: 228 mg/dL (12-30-23 @ 06:39)  POCT Blood Glucose.: 210 mg/dL (12-30-23 @ 06:10)  POCT Blood Glucose.: 133 mg/dL (12-30-23 @ 04:51)  POCT Blood Glucose.: 92 mg/dL (12-30-23 @ 04:03)  POCT Blood Glucose.: 108 mg/dL (12-30-23 @ 02:56)  POCT Blood Glucose.: 123 mg/dL (12-30-23 @ 02:03)  POCT Blood Glucose.: 150 mg/dL (12-30-23 @ 00:59)  POCT Blood Glucose.: 169 mg/dL (12-29-23 @ 23:58)  POCT Blood Glucose.: 198 mg/dL (12-29-23 @ 22:48)  POCT Blood Glucose.: 230 mg/dL (12-29-23 @ 22:00)  POCT Blood Glucose.: 278 mg/dL (12-29-23 @ 21:09)  POCT Blood Glucose.: 241 mg/dL (12-29-23 @ 19:49)  POCT Blood Glucose.: 172 mg/dL (12-29-23 @ 18:51)  POCT Blood Glucose.: 149 mg/dL (12-29-23 @ 17:50)  POCT Blood Glucose.: 184 mg/dL (12-29-23 @ 15:50)  POCT Blood Glucose.: 244 mg/dL (12-29-23 @ 14:58)  POCT Blood Glucose.: 329 mg/dL (12-29-23 @ 13:44)  POCT Blood Glucose.: 256 mg/dL (12-29-23 @ 11:51)  POCT Blood Glucose.: 199 mg/dL (12-29-23 @ 10:06)      eMAR:  methylPREDNISolone sodium succinate Injectable   20 milliGRAM(s) IV Push (01-01-24 @ 05:15)   20 milliGRAM(s) IV Push (12-31-23 @ 17:56)      01-01    130<L>  |  92<L>  |  38<H>  ----------------------------<  79  3.9   |  29  |  0.90    eGFR: 98    Ca    9.2      01-01  Mg     1.9     01-01  Phos  2.5     01-01    TPro  6.0  /  Alb  3.8  /  TBili  0.4  /  DBili  x   /  AST  31  /  ALT  62<H>  /  AlkPhos  43  01-01      A1C with Estimated Average Glucose Result: 8.3 % (11-01-23 @ 06:14)       ENDOCRINE FOLLOW UP     Chief Complaint: steroid induced hyperglycemia     History: patient with erratic PO intake. Per discussion with patient's RN & ACP, patient ate breakfast tray and then later this morning had another breakfast tray (bagel/cream cheese per pt).     MEDICATIONS  (STANDING):  acetaminophen     Tablet .. 650 milliGRAM(s) Oral every 6 hours  budesonide  80 MICROgram(s)/formoterol 4.5 MICROgram(s) Inhaler 1 Puff(s) Inhalation two times a day  buMETAnide Injectable 1 milliGRAM(s) IV Push <User Schedule>  chlorhexidine 4% Liquid 1 Application(s) Topical <User Schedule>  dextrose 50% Injectable 50 milliLiter(s) IV Push every 15 minutes  heparin   Injectable 5000 Unit(s) SubCutaneous every 8 hours  hydrALAZINE 100 milliGRAM(s) Oral every 8 hours  insulin lispro (ADMELOG) corrective regimen sliding scale   SubCutaneous at bedtime  insulin lispro (ADMELOG) corrective regimen sliding scale   SubCutaneous three times a day before meals  insulin regular Infusion 3 Unit(s)/Hr (3 mL/Hr) IV Continuous <Continuous>  lidocaine   4% Patch 3 Patch Transdermal daily  magnesium citrate Oral Solution 296 milliLiter(s) Oral daily  methylPREDNISolone sodium succinate Injectable 16 milliGRAM(s) IV Push every 12 hours  methylPREDNISolone sodium succinate Injectable   IV Push   metoclopramide Injectable 10 milliGRAM(s) IV Push every 8 hours  mycophenolate mofetil 1000 milliGRAM(s) Oral every 12 hours  naloxegol 25 milliGRAM(s) Oral daily  NIFEdipine XL 60 milliGRAM(s) Oral every 12 hours  nystatin    Suspension 795799 Unit(s) Oral <User Schedule>  pantoprazole  Injectable 40 milliGRAM(s) IV Push daily  polyethylene glycol 3350 17 Gram(s) Oral daily  pregabalin 50 milliGRAM(s) Oral every 8 hours  senna 2 Tablet(s) Oral at bedtime  tacrolimus 6 milliGRAM(s) Oral <User Schedule>  trimethoprim   80 mG/sulfamethoxazole 400 mG 1 Tablet(s) Oral daily  valGANciclovir 450 milliGRAM(s) Oral every 12 hours    MEDICATIONS  (PRN):      Allergies    penicillins (Unknown)    Intolerances        ROS: All other systems reviewed and negative    PHYSICAL EXAM:  VITALS: T(C): 36.4 (01-01-24 @ 03:18)  T(F): 97.6 (01-01-24 @ 03:18), Max: 97.8 (12-31-23 @ 12:00)  HR: 95 (01-01-24 @ 07:26) (91 - 110)  BP: 102/63 (12-31-23 @ 21:30) (102/63 - 127/65)  RR:  (18 - 27)  SpO2:  (94% - 99%)  Wt(kg): --  GENERAL: NAD, resting comfortably   EYES: No proptosis,  anicteric  HEENT:  Atraumatic, Normocephalic, moist mucous membranes  RESPIRATORY: Nonlabored respirations on room air, normal rate/effort   NEURO: AOx3, moves all extremities spontaneously   PSYCH:  reactive affect, euthymic mood    CAPILLARY BLOOD GLUCOSE    POCT Blood Glucose.: 570 mg/dL (01 Jan 2024 13:15)  POCT Blood Glucose.: 185 mg/dL (01-01-24 @ 08:10)  POCT Blood Glucose.: 122 mg/dL (01-01-24 @ 06:18)  POCT Blood Glucose.: 92 mg/dL (01-01-24 @ 05:22)  POCT Blood Glucose.: 89 mg/dL (01-01-24 @ 04:35)  POCT Blood Glucose.: 120 mg/dL (01-01-24 @ 03:13)  POCT Blood Glucose.: 141 mg/dL (01-01-24 @ 02:11)  POCT Blood Glucose.: 166 mg/dL (01-01-24 @ 01:20)  POCT Blood Glucose.: 193 mg/dL (01-01-24 @ 00:23)  POCT Blood Glucose.: 249 mg/dL (12-31-23 @ 22:52)  POCT Blood Glucose.: 203 mg/dL (12-31-23 @ 21:31)  POCT Blood Glucose.: 103 mg/dL (12-31-23 @ 20:28)  POCT Blood Glucose.: 67 mg/dL (12-31-23 @ 20:04)  POCT Blood Glucose.: 104 mg/dL (12-31-23 @ 19:04)  POCT Blood Glucose.: 104 mg/dL (12-31-23 @ 18:06)  POCT Blood Glucose.: 129 mg/dL (12-31-23 @ 17:12)  POCT Blood Glucose.: 202 mg/dL (12-31-23 @ 16:08)  POCT Blood Glucose.: 201 mg/dL (12-31-23 @ 14:58)  POCT Blood Glucose.: 158 mg/dL (12-31-23 @ 13:42)  POCT Blood Glucose.: 84 mg/dL (12-31-23 @ 12:41)  POCT Blood Glucose.: 110 mg/dL (12-31-23 @ 12:08)  POCT Blood Glucose.: 119 mg/dL (12-31-23 @ 11:13)  POCT Blood Glucose.: 130 mg/dL (12-31-23 @ 10:15)  POCT Blood Glucose.: 190 mg/dL (12-31-23 @ 09:19)  POCT Blood Glucose.: 160 mg/dL (12-31-23 @ 08:19)  POCT Blood Glucose.: 160 mg/dL (12-31-23 @ 06:39)  POCT Blood Glucose.: 137 mg/dL (12-31-23 @ 05:53)  POCT Blood Glucose.: 146 mg/dL (12-31-23 @ 04:50)  POCT Blood Glucose.: 162 mg/dL (12-31-23 @ 03:57)  POCT Blood Glucose.: 107 mg/dL (12-31-23 @ 01:55)  POCT Blood Glucose.: 114 mg/dL (12-31-23 @ 01:02)  POCT Blood Glucose.: 119 mg/dL (12-30-23 @ 23:58)  POCT Blood Glucose.: 126 mg/dL (12-30-23 @ 22:58)  POCT Blood Glucose.: 143 mg/dL (12-30-23 @ 21:50)  POCT Blood Glucose.: 165 mg/dL (12-30-23 @ 20:56)  POCT Blood Glucose.: 181 mg/dL (12-30-23 @ 19:49)  POCT Blood Glucose.: 179 mg/dL (12-30-23 @ 18:42)  POCT Blood Glucose.: 197 mg/dL (12-30-23 @ 18:04)  POCT Blood Glucose.: 244 mg/dL (12-30-23 @ 17:30)  POCT Blood Glucose.: 165 mg/dL (12-30-23 @ 16:00)  POCT Blood Glucose.: 106 mg/dL (12-30-23 @ 14:54)  POCT Blood Glucose.: 119 mg/dL (12-30-23 @ 13:54)  POCT Blood Glucose.: 125 mg/dL (12-30-23 @ 13:29)  POCT Blood Glucose.: 161 mg/dL (12-30-23 @ 12:16)  POCT Blood Glucose.: 238 mg/dL (12-30-23 @ 11:20)  POCT Blood Glucose.: 237 mg/dL (12-30-23 @ 09:46)  POCT Blood Glucose.: 232 mg/dL (12-30-23 @ 08:56)  POCT Blood Glucose.: 214 mg/dL (12-30-23 @ 07:46)  POCT Blood Glucose.: 228 mg/dL (12-30-23 @ 06:39)  POCT Blood Glucose.: 210 mg/dL (12-30-23 @ 06:10)  POCT Blood Glucose.: 133 mg/dL (12-30-23 @ 04:51)  POCT Blood Glucose.: 92 mg/dL (12-30-23 @ 04:03)  POCT Blood Glucose.: 108 mg/dL (12-30-23 @ 02:56)  POCT Blood Glucose.: 123 mg/dL (12-30-23 @ 02:03)  POCT Blood Glucose.: 150 mg/dL (12-30-23 @ 00:59)  POCT Blood Glucose.: 169 mg/dL (12-29-23 @ 23:58)  POCT Blood Glucose.: 198 mg/dL (12-29-23 @ 22:48)  POCT Blood Glucose.: 230 mg/dL (12-29-23 @ 22:00)  POCT Blood Glucose.: 278 mg/dL (12-29-23 @ 21:09)  POCT Blood Glucose.: 241 mg/dL (12-29-23 @ 19:49)  POCT Blood Glucose.: 172 mg/dL (12-29-23 @ 18:51)  POCT Blood Glucose.: 149 mg/dL (12-29-23 @ 17:50)  POCT Blood Glucose.: 184 mg/dL (12-29-23 @ 15:50)  POCT Blood Glucose.: 244 mg/dL (12-29-23 @ 14:58)  POCT Blood Glucose.: 329 mg/dL (12-29-23 @ 13:44)  POCT Blood Glucose.: 256 mg/dL (12-29-23 @ 11:51)  POCT Blood Glucose.: 199 mg/dL (12-29-23 @ 10:06)      eMAR:  methylPREDNISolone sodium succinate Injectable   20 milliGRAM(s) IV Push (01-01-24 @ 05:15)   20 milliGRAM(s) IV Push (12-31-23 @ 17:56)      01-01    130<L>  |  92<L>  |  38<H>  ----------------------------<  79  3.9   |  29  |  0.90    eGFR: 98    Ca    9.2      01-01  Mg     1.9     01-01  Phos  2.5     01-01    TPro  6.0  /  Alb  3.8  /  TBili  0.4  /  DBili  x   /  AST  31  /  ALT  62<H>  /  AlkPhos  43  01-01      A1C with Estimated Average Glucose Result: 8.3 % (11-01-23 @ 06:14)       ENDOCRINE FOLLOW UP     Chief Complaint: steroid induced hyperglycemia     History: patient with erratic PO intake. Per discussion with patient's RN & ACP, patient ate breakfast tray and then later this morning had another breakfast tray (bagel/cream cheese per pt).     MEDICATIONS  (STANDING):  acetaminophen     Tablet .. 650 milliGRAM(s) Oral every 6 hours  budesonide  80 MICROgram(s)/formoterol 4.5 MICROgram(s) Inhaler 1 Puff(s) Inhalation two times a day  buMETAnide Injectable 1 milliGRAM(s) IV Push <User Schedule>  chlorhexidine 4% Liquid 1 Application(s) Topical <User Schedule>  dextrose 50% Injectable 50 milliLiter(s) IV Push every 15 minutes  heparin   Injectable 5000 Unit(s) SubCutaneous every 8 hours  hydrALAZINE 100 milliGRAM(s) Oral every 8 hours  insulin lispro (ADMELOG) corrective regimen sliding scale   SubCutaneous at bedtime  insulin lispro (ADMELOG) corrective regimen sliding scale   SubCutaneous three times a day before meals  insulin regular Infusion 3 Unit(s)/Hr (3 mL/Hr) IV Continuous <Continuous>  lidocaine   4% Patch 3 Patch Transdermal daily  magnesium citrate Oral Solution 296 milliLiter(s) Oral daily  methylPREDNISolone sodium succinate Injectable 16 milliGRAM(s) IV Push every 12 hours  methylPREDNISolone sodium succinate Injectable   IV Push   metoclopramide Injectable 10 milliGRAM(s) IV Push every 8 hours  mycophenolate mofetil 1000 milliGRAM(s) Oral every 12 hours  naloxegol 25 milliGRAM(s) Oral daily  NIFEdipine XL 60 milliGRAM(s) Oral every 12 hours  nystatin    Suspension 321240 Unit(s) Oral <User Schedule>  pantoprazole  Injectable 40 milliGRAM(s) IV Push daily  polyethylene glycol 3350 17 Gram(s) Oral daily  pregabalin 50 milliGRAM(s) Oral every 8 hours  senna 2 Tablet(s) Oral at bedtime  tacrolimus 6 milliGRAM(s) Oral <User Schedule>  trimethoprim   80 mG/sulfamethoxazole 400 mG 1 Tablet(s) Oral daily  valGANciclovir 450 milliGRAM(s) Oral every 12 hours    MEDICATIONS  (PRN):      Allergies    penicillins (Unknown)    Intolerances        ROS: All other systems reviewed and negative    PHYSICAL EXAM:  VITALS: T(C): 36.4 (01-01-24 @ 03:18)  T(F): 97.6 (01-01-24 @ 03:18), Max: 97.8 (12-31-23 @ 12:00)  HR: 95 (01-01-24 @ 07:26) (91 - 110)  BP: 102/63 (12-31-23 @ 21:30) (102/63 - 127/65)  RR:  (18 - 27)  SpO2:  (94% - 99%)  Wt(kg): --  GENERAL: NAD, resting comfortably   EYES: No proptosis,  anicteric  HEENT:  Atraumatic, Normocephalic, moist mucous membranes  RESPIRATORY: Nonlabored respirations on room air, normal rate/effort   NEURO: AOx3, moves all extremities spontaneously   PSYCH:  reactive affect, euthymic mood    CAPILLARY BLOOD GLUCOSE    POCT Blood Glucose.: 570 mg/dL (01 Jan 2024 13:15)  POCT Blood Glucose.: 185 mg/dL (01-01-24 @ 08:10)  POCT Blood Glucose.: 122 mg/dL (01-01-24 @ 06:18)  POCT Blood Glucose.: 92 mg/dL (01-01-24 @ 05:22)  POCT Blood Glucose.: 89 mg/dL (01-01-24 @ 04:35)  POCT Blood Glucose.: 120 mg/dL (01-01-24 @ 03:13)  POCT Blood Glucose.: 141 mg/dL (01-01-24 @ 02:11)  POCT Blood Glucose.: 166 mg/dL (01-01-24 @ 01:20)  POCT Blood Glucose.: 193 mg/dL (01-01-24 @ 00:23)  POCT Blood Glucose.: 249 mg/dL (12-31-23 @ 22:52)  POCT Blood Glucose.: 203 mg/dL (12-31-23 @ 21:31)  POCT Blood Glucose.: 103 mg/dL (12-31-23 @ 20:28)  POCT Blood Glucose.: 67 mg/dL (12-31-23 @ 20:04)  POCT Blood Glucose.: 104 mg/dL (12-31-23 @ 19:04)  POCT Blood Glucose.: 104 mg/dL (12-31-23 @ 18:06)  POCT Blood Glucose.: 129 mg/dL (12-31-23 @ 17:12)  POCT Blood Glucose.: 202 mg/dL (12-31-23 @ 16:08)  POCT Blood Glucose.: 201 mg/dL (12-31-23 @ 14:58)  POCT Blood Glucose.: 158 mg/dL (12-31-23 @ 13:42)  POCT Blood Glucose.: 84 mg/dL (12-31-23 @ 12:41)  POCT Blood Glucose.: 110 mg/dL (12-31-23 @ 12:08)  POCT Blood Glucose.: 119 mg/dL (12-31-23 @ 11:13)  POCT Blood Glucose.: 130 mg/dL (12-31-23 @ 10:15)  POCT Blood Glucose.: 190 mg/dL (12-31-23 @ 09:19)  POCT Blood Glucose.: 160 mg/dL (12-31-23 @ 08:19)  POCT Blood Glucose.: 160 mg/dL (12-31-23 @ 06:39)  POCT Blood Glucose.: 137 mg/dL (12-31-23 @ 05:53)  POCT Blood Glucose.: 146 mg/dL (12-31-23 @ 04:50)  POCT Blood Glucose.: 162 mg/dL (12-31-23 @ 03:57)  POCT Blood Glucose.: 107 mg/dL (12-31-23 @ 01:55)  POCT Blood Glucose.: 114 mg/dL (12-31-23 @ 01:02)  POCT Blood Glucose.: 119 mg/dL (12-30-23 @ 23:58)  POCT Blood Glucose.: 126 mg/dL (12-30-23 @ 22:58)  POCT Blood Glucose.: 143 mg/dL (12-30-23 @ 21:50)  POCT Blood Glucose.: 165 mg/dL (12-30-23 @ 20:56)  POCT Blood Glucose.: 181 mg/dL (12-30-23 @ 19:49)  POCT Blood Glucose.: 179 mg/dL (12-30-23 @ 18:42)  POCT Blood Glucose.: 197 mg/dL (12-30-23 @ 18:04)  POCT Blood Glucose.: 244 mg/dL (12-30-23 @ 17:30)  POCT Blood Glucose.: 165 mg/dL (12-30-23 @ 16:00)  POCT Blood Glucose.: 106 mg/dL (12-30-23 @ 14:54)  POCT Blood Glucose.: 119 mg/dL (12-30-23 @ 13:54)  POCT Blood Glucose.: 125 mg/dL (12-30-23 @ 13:29)  POCT Blood Glucose.: 161 mg/dL (12-30-23 @ 12:16)  POCT Blood Glucose.: 238 mg/dL (12-30-23 @ 11:20)  POCT Blood Glucose.: 237 mg/dL (12-30-23 @ 09:46)  POCT Blood Glucose.: 232 mg/dL (12-30-23 @ 08:56)  POCT Blood Glucose.: 214 mg/dL (12-30-23 @ 07:46)  POCT Blood Glucose.: 228 mg/dL (12-30-23 @ 06:39)  POCT Blood Glucose.: 210 mg/dL (12-30-23 @ 06:10)  POCT Blood Glucose.: 133 mg/dL (12-30-23 @ 04:51)  POCT Blood Glucose.: 92 mg/dL (12-30-23 @ 04:03)  POCT Blood Glucose.: 108 mg/dL (12-30-23 @ 02:56)  POCT Blood Glucose.: 123 mg/dL (12-30-23 @ 02:03)  POCT Blood Glucose.: 150 mg/dL (12-30-23 @ 00:59)  POCT Blood Glucose.: 169 mg/dL (12-29-23 @ 23:58)  POCT Blood Glucose.: 198 mg/dL (12-29-23 @ 22:48)  POCT Blood Glucose.: 230 mg/dL (12-29-23 @ 22:00)  POCT Blood Glucose.: 278 mg/dL (12-29-23 @ 21:09)  POCT Blood Glucose.: 241 mg/dL (12-29-23 @ 19:49)  POCT Blood Glucose.: 172 mg/dL (12-29-23 @ 18:51)  POCT Blood Glucose.: 149 mg/dL (12-29-23 @ 17:50)  POCT Blood Glucose.: 184 mg/dL (12-29-23 @ 15:50)  POCT Blood Glucose.: 244 mg/dL (12-29-23 @ 14:58)  POCT Blood Glucose.: 329 mg/dL (12-29-23 @ 13:44)  POCT Blood Glucose.: 256 mg/dL (12-29-23 @ 11:51)  POCT Blood Glucose.: 199 mg/dL (12-29-23 @ 10:06)      eMAR:  methylPREDNISolone sodium succinate Injectable   20 milliGRAM(s) IV Push (01-01-24 @ 05:15)   20 milliGRAM(s) IV Push (12-31-23 @ 17:56)      01-01    130<L>  |  92<L>  |  38<H>  ----------------------------<  79  3.9   |  29  |  0.90    eGFR: 98    Ca    9.2      01-01  Mg     1.9     01-01  Phos  2.5     01-01    TPro  6.0  /  Alb  3.8  /  TBili  0.4  /  DBili  x   /  AST  31  /  ALT  62<H>  /  AlkPhos  43  01-01      A1C with Estimated Average Glucose Result: 8.3 % (11-01-23 @ 06:14)

## 2024-01-01 NOTE — PROGRESS NOTE ADULT - ASSESSMENT
58M : HFrEF (LVIDd 6.4 cm, LVEF 32%), PCI ('08), HTN, DMT2 (A1c 8.3%) and CVA s/p TPA ('18), recently treated for PNA who initially presented to [facility] via EMS after syncope reportedly requiring defibrillation. Treated for ACS but left AMA to come to [facility]. On arrival ECG with ST depression in lateral leads. Intubated 11/1 for respiratory failure and was found to have COVID. L/RHC 11/1revealing  of LAD and RCA, elevated filling pressures and CI of 1.5 prompting placement of IABP. Extubated 11/3. IABP was weaned to off 11/7, however the following day developed PMVT cardiac arrest with prompt CPR and defibrillation, started on IV Lidocaine/Amio and IABP ultimately replaced on 11/9. During this admission was found to be bacteremic for which she was treated for Staph epi, Enterococcus faecalis, Cutibacterium with IV abx.  Was listed Status 2, ABO A, PRA 0% (12/12).     A suitable donor was identified and on 12/25, he underwent OHT (ischemic Time: 253min, CMV -/+, Toxo -/-). The following day, extubated and IABP removed. Milrinone was being gradually weaned but when turned off yesterday, despite adequate perfusion indicies, had rise in lactate for which inotropic support was resumed. Currently appears well supported and compensated. Will again trial wean off inotrope. Will also adjust oral antihypertensive regimen with goal to wean off Cardene gtt. Plan for 1st EMBX next week on 1/2.     On 10/25/23, pt underwent OHT  - IABP removed 12/26  - Sabrina and Epi weaned off 12/26  - Milrinone off  - Procardia XL 60 mg BID  - Bumex TID  - 1st RHC/EMBx on Tuesday 1/2  - Transfer to Stepdown 12/31  -  Right femerol rangel in place   - Med CT x 3 - LWS      insulin gtt d/t hyperglycemia - house endo following   - d/c planning - aniticipate home   1/1/24 - VSS - pt. c/o uncomfortable bed last evening - stating he would sign out AMA if he was not given a more comfortable bed.  staff obliged & a new bed was brought in.  pt requesting new urinal with each void as he "is at risk for an infection"  MED CT x 3 in place  ?d/c cody multani today - will rounds with transplant team 58M : HFrEF (LVIDd 6.4 cm, LVEF 32%), PCI ('08), HTN, DMT2 (A1c 8.3%) and CVA s/p TPA ('18), recently treated for PNA who initially presented to [facility] via EMS after syncope reportedly requiring defibrillation. Treated for ACS but left AMA to come to [facility]. On arrival ECG with ST depression in lateral leads. Intubated 11/1 for respiratory failure and was found to have COVID. L/RHC 11/1revealing  of LAD and RCA, elevated filling pressures and CI of 1.5 prompting placement of IABP. Extubated 11/3. IABP was weaned to off 11/7, however the following day developed PMVT cardiac arrest with prompt CPR and defibrillation, started on IV Lidocaine/Amio and IABP ultimately replaced on 11/9. During this admission was found to be bacteremic for which she was treated for Staph epi, Enterococcus faecalis, Cutibacterium with IV abx.  Was listed Status 2, ABO A, PRA 0% (12/12).     A suitable donor was identified and on 12/25, he underwent OHT (ischemic Time: 253min, CMV -/+, Toxo -/-). The following day, extubated and IABP removed. Milrinone was being gradually weaned but when turned off yesterday, despite adequate perfusion indicies, had rise in lactate for which inotropic support was resumed. Currently appears well supported and compensated. Will again trial wean off inotrope. Will also adjust oral antihypertensive regimen with goal to wean off Cardene gtt. Plan for 1st EMBX next week on 1/2.     On 10/25/23, pt underwent OHT  - IABP removed 12/26  - Sabrina and Epi weaned off 12/26  - Milrinone off  - Procardia XL 60 mg BID  - Bumex TID  - 1st RHC/EMBx on Tuesday 1/2  - Transfer to Stepdown 12/31  -  Right femerol rangel in place   - Med CT x 3 - LWS      insulin gtt d/t hyperglycemia - house endo following   - d/c planning - aniticipate home   1/1/24 - VSS - pt. c/o uncomfortable bed last evening - stating he would sign out AMA if he was not given a more comfortable bed.  staff obliged & a new bed was brought in.  pt requesting new urinal with each void as he "is at risk for an infection"  MED CT x 3 in place  ?d/c cody multani today - will rounds with transplant team 58M : HFrEF (LVIDd 6.4 cm, LVEF 32%), PCI ('08), HTN, DMT2 (A1c 8.3%) and CVA s/p TPA ('18), recently treated for PNA who initially presented to [facility] via EMS after syncope reportedly requiring defibrillation. Treated for ACS but left AMA to come to [facility]. On arrival ECG with ST depression in lateral leads. Intubated 11/1 for respiratory failure and was found to have COVID. L/RHC 11/1revealing  of LAD and RCA, elevated filling pressures and CI of 1.5 prompting placement of IABP. Extubated 11/3. IABP was weaned to off 11/7, however the following day developed PMVT cardiac arrest with prompt CPR and defibrillation, started on IV Lidocaine/Amio and IABP ultimately replaced on 11/9. During this admission was found to be bacteremic for which she was treated for Staph epi, Enterococcus faecalis, Cutibacterium with IV abx.  Was listed Status 2, ABO A, PRA 0% (12/12).     A suitable donor was identified and on 12/25, he underwent OHT (ischemic Time: 253min, CMV -/+, Toxo -/-). The following day, extubated and IABP removed. Milrinone was being gradually weaned but when turned off yesterday, despite adequate perfusion indicies, had rise in lactate for which inotropic support was resumed. Currently appears well supported and compensated. Will again trial wean off inotrope. Will also adjust oral antihypertensive regimen with goal to wean off Cardene gtt. Plan for 1st EMBX next week on 1/2.     On 10/25/23, pt underwent OHT  - IABP removed 12/26  - Sabrina and Epi weaned off 12/26  - Milrinone off  - Procardia XL 60 mg BID  - Bumex TID  - 1st RHC/EMBx on Tuesday 1/2  - Transfer to Stepdown 12/31  -  Right femerol rangel in place   - Med CT x 3 - LWS      insulin gtt d/t hyperglycemia - house endo following   - d/c planning - aniticipate home   1/1/24 - VSS - pt. c/o uncomfortable bed last evening - stating he would sign out AMA if he was not given a more comfortable bed.  staff obliged & a new bed was brought in.  pt requesting new urinal with each void as he "is at risk for an infection"  Ptt properly educated on post transplant risks.  states, "A doctor told me I can only use a urinal once to prevent infection"  transplant team to reinforce post-transplant precautions. MED CT x 3 in place  ?d/c cody multani today - will rounds with transplant team 58M : HFrEF (LVIDd 6.4 cm, LVEF 32%), PCI ('08), HTN, DMT2 (A1c 8.3%) and CVA s/p TPA ('18), recently treated for PNA who initially presented to [facility] via EMS after syncope reportedly requiring defibrillation. Treated for ACS but left AMA to come to [facility]. On arrival ECG with ST depression in lateral leads. Intubated 11/1 for respiratory failure and was found to have COVID. L/RHC 11/1revealing  of LAD and RCA, elevated filling pressures and CI of 1.5 prompting placement of IABP. Extubated 11/3. IABP was weaned to off 11/7, however the following day developed PMVT cardiac arrest with prompt CPR and defibrillation, started on IV Lidocaine/Amio and IABP ultimately replaced on 11/9. During this admission was found to be bacteremic for which she was treated for Staph epi, Enterococcus faecalis, Cutibacterium with IV abx.  Was listed Status 2, ABO A, PRA 0% (12/12).     A suitable donor was identified and on 12/25, he underwent OHT (ischemic Time: 253min, CMV -/+, Toxo -/-). The following day, extubated and IABP removed. Milrinone was being gradually weaned but when turned off yesterday, despite adequate perfusion indicies, had rise in lactate for which inotropic support was resumed. Currently appears well supported and compensated. Will again trial wean off inotrope. Will also adjust oral antihypertensive regimen with goal to wean off Cardene gtt. Plan for 1st EMBX next week on 1/2.     On 10/25/23, pt underwent OHT  - IABP removed 12/26  - Sabrina and Epi weaned off 12/26  - Milrinone off  - Procardia XL 60 mg BID  - Bumex TID  - 1st RHC/EMBx on Tuesday 1/2  - Transfer to Stepdown 12/31  -  Right femerol rangel in place   - Med CT x 3 - LWS      insulin gtt d/t hyperglycemia - house endo following   - d/c planning - aniticipate home   1/1/24 - VSS - pt. c/o uncomfortable bed last evening - stating he would sign out AMA if he was not given a more comfortable bed.  staff obliged & a new bed was brought in.  pt requesting new urinal with each void as he "is at risk for an infection"  Ptt properly educated on post transplant risks.  states, "A doctor told me I can only use a urinal once to prevent infection"  transplant team to reinforce post-transplant precautions. MED CT x 3 in place  ?d/c cody multani today - will rounds with transplant team

## 2024-01-01 NOTE — PROGRESS NOTE ADULT - SUBJECTIVE AND OBJECTIVE BOX
VITAL SIGNS-Telemetry:  SR   Vital Signs Last 24 Hrs  T(C): 36.4 (24 @ 03:18), Max: 36.6 (23 @ 12:00)  T(F): 97.6 (24 @ 03:18), Max: 97.8 (23 @ 12:00)  HR: 91 (24 @ 03:18) (91 - 110)  BP: 102/63 (23 @ 21:30) (102/63 - 127/65)  RR: 18 (24 @ 03:18) (18 - 27)  SpO2: 95% (24 @ 03:18) (94% - 99%)          @ 07:01  -   @ 07:00  --------------------------------------------------------  IN: 3068.5 mL / OUT: 1685 mL / NET: 1383.5 mL     @ 07:  -   @ 05:14  --------------------------------------------------------  IN: 1327 mL / OUT: 3295 mL / NET: -1968 mL    Daily     Daily Weight in k.6 (31 Dec 2023 11:00)    CAPILLARY BLOOD GLUCOSE  158 (31 Dec 2023 14:00)  89 (31 Dec 2023 13:00)  110 (31 Dec 2023 12:00)  118 (31 Dec 2023 11:00)  130 (31 Dec 2023 10:00)  190 (31 Dec 2023 09:00)  160 (31 Dec 2023 08:00)    Drains:     MS 1         [ x ] Drainage:                ms 2 [ x ]  Drainage:                ms3 [  x]  Drainage:  Pacing Wires        [  x]   Settings:    vvi 50/10                              Isolated  [  ]     PHYSICAL EXAM:  Neurology: alert and oriented x 3, nonfocal, no gross deficits  CV : S1S2  Sternal Wound :  CDI , Stable  Lungs: cta  Abdomen: soft, nontender, nondistended, positive bowel sounds, last bowel movement         Extremities:     + edema b/l  no calf tenderness  l femerol rangel cdi    acetaminophen     Tablet .. 650 milliGRAM(s) Oral every 6 hours  budesonide  80 MICROgram(s)/formoterol 4.5 MICROgram(s) Inhaler 1 Puff(s) Inhalation two times a day  buMETAnide Injectable 1 milliGRAM(s) IV Push <User Schedule>  chlorhexidine 4% Liquid 1 Application(s) Topical <User Schedule>  dextrose 50% Injectable 50 milliLiter(s) IV Push every 15 minutes  heparin   Injectable 5000 Unit(s) SubCutaneous every 8 hours  hydrALAZINE 100 milliGRAM(s) Oral every 8 hours  insulin regular Infusion 3 Unit(s)/Hr IV Continuous <Continuous>  lidocaine   4% Patch 3 Patch Transdermal daily  magnesium citrate Oral Solution 296 milliLiter(s) Oral daily  methocarbamol 500 milliGRAM(s) Oral every 8 hours  methylPREDNISolone sodium succinate Injectable 16 milliGRAM(s) IV Push every 12 hours  methylPREDNISolone sodium succinate Injectable   IV Push   methylPREDNISolone sodium succinate Injectable 20 milliGRAM(s) IV Push every 12 hours  metoclopramide Injectable 10 milliGRAM(s) IV Push every 8 hours  mycophenolate mofetil 1000 milliGRAM(s) Oral every 12 hours  naloxegol 25 milliGRAM(s) Oral daily  NIFEdipine XL 60 milliGRAM(s) Oral every 12 hours  nystatin    Suspension 094798 Unit(s) Oral <User Schedule>  pantoprazole  Injectable 40 milliGRAM(s) IV Push daily  polyethylene glycol 3350 17 Gram(s) Oral daily  pregabalin 50 milliGRAM(s) Oral every 8 hours  senna 2 Tablet(s) Oral at bedtime  simethicone 160 milliGRAM(s) Chew every 8 hours  tacrolimus 6 milliGRAM(s) Oral <User Schedule>  traMADol 25 milliGRAM(s) Oral every 8 hours  trimethoprim   80 mG/sulfamethoxazole 400 mG 1 Tablet(s) Oral daily  valGANciclovir 450 milliGRAM(s) Oral every 12 hours    Physical Therapy Rec:   Home  [x  ]   Home w/ PT  [  ]  Rehab  [  ]  Discussed with Cardiothoracic Team at AM rounds. VITAL SIGNS-Telemetry:  SR   Vital Signs Last 24 Hrs  T(C): 36.4 (24 @ 03:18), Max: 36.6 (23 @ 12:00)  T(F): 97.6 (24 @ 03:18), Max: 97.8 (23 @ 12:00)  HR: 91 (24 @ 03:18) (91 - 110)  BP: 102/63 (23 @ 21:30) (102/63 - 127/65)  RR: 18 (24 @ 03:18) (18 - 27)  SpO2: 95% (24 @ 03:18) (94% - 99%)          @ 07:01  -   @ 07:00  --------------------------------------------------------  IN: 3068.5 mL / OUT: 1685 mL / NET: 1383.5 mL     @ 07:  -   @ 05:14  --------------------------------------------------------  IN: 1327 mL / OUT: 3295 mL / NET: -1968 mL    Daily     Daily Weight in k.6 (31 Dec 2023 11:00)    CAPILLARY BLOOD GLUCOSE  158 (31 Dec 2023 14:00)  89 (31 Dec 2023 13:00)  110 (31 Dec 2023 12:00)  118 (31 Dec 2023 11:00)  130 (31 Dec 2023 10:00)  190 (31 Dec 2023 09:00)  160 (31 Dec 2023 08:00)    Drains:     MS 1         [ x ] Drainage:                ms 2 [ x ]  Drainage:                ms3 [  x]  Drainage:  Pacing Wires        [  x]   Settings:    vvi 50/10                              Isolated  [  ]     PHYSICAL EXAM:  Neurology: alert and oriented x 3, nonfocal, no gross deficits  CV : S1S2  Sternal Wound :  CDI , Stable  Lungs: cta  Abdomen: soft, nontender, nondistended, positive bowel sounds, last bowel movement         Extremities:     + edema b/l  no calf tenderness  l femerol rangel cdi    acetaminophen     Tablet .. 650 milliGRAM(s) Oral every 6 hours  budesonide  80 MICROgram(s)/formoterol 4.5 MICROgram(s) Inhaler 1 Puff(s) Inhalation two times a day  buMETAnide Injectable 1 milliGRAM(s) IV Push <User Schedule>  chlorhexidine 4% Liquid 1 Application(s) Topical <User Schedule>  dextrose 50% Injectable 50 milliLiter(s) IV Push every 15 minutes  heparin   Injectable 5000 Unit(s) SubCutaneous every 8 hours  hydrALAZINE 100 milliGRAM(s) Oral every 8 hours  insulin regular Infusion 3 Unit(s)/Hr IV Continuous <Continuous>  lidocaine   4% Patch 3 Patch Transdermal daily  magnesium citrate Oral Solution 296 milliLiter(s) Oral daily  methocarbamol 500 milliGRAM(s) Oral every 8 hours  methylPREDNISolone sodium succinate Injectable 16 milliGRAM(s) IV Push every 12 hours  methylPREDNISolone sodium succinate Injectable   IV Push   methylPREDNISolone sodium succinate Injectable 20 milliGRAM(s) IV Push every 12 hours  metoclopramide Injectable 10 milliGRAM(s) IV Push every 8 hours  mycophenolate mofetil 1000 milliGRAM(s) Oral every 12 hours  naloxegol 25 milliGRAM(s) Oral daily  NIFEdipine XL 60 milliGRAM(s) Oral every 12 hours  nystatin    Suspension 026929 Unit(s) Oral <User Schedule>  pantoprazole  Injectable 40 milliGRAM(s) IV Push daily  polyethylene glycol 3350 17 Gram(s) Oral daily  pregabalin 50 milliGRAM(s) Oral every 8 hours  senna 2 Tablet(s) Oral at bedtime  simethicone 160 milliGRAM(s) Chew every 8 hours  tacrolimus 6 milliGRAM(s) Oral <User Schedule>  traMADol 25 milliGRAM(s) Oral every 8 hours  trimethoprim   80 mG/sulfamethoxazole 400 mG 1 Tablet(s) Oral daily  valGANciclovir 450 milliGRAM(s) Oral every 12 hours    Physical Therapy Rec:   Home  [x  ]   Home w/ PT  [  ]  Rehab  [  ]  Discussed with Cardiothoracic Team at AM rounds.

## 2024-01-01 NOTE — PROGRESS NOTE ADULT - ASSESSMENT
59 yo M with HFrEF (LVIDd 6.4 cm, LVEF 32%), CAD s/p PCI (2008), HTN, DMT2 (A1c 8.3%) and CVA s/p TPA (2018), recently treated for PNA who initially presented to [facility] via EMS after syncope reportedly requiring defibrillation. Treated for ACS but left AMA to come to [facility]. On arrival ECG with ST depression in lateral leads. Intubated 11/1 for respiratory failure and was found to have COVID. L/RHC 11/1revealing  of LAD and RCA, elevated filling pressures and CI of 1.5 prompting placement of IABP. Extubated 11/3. IABP was weaned to off 11/7, however the following day developed PMVT cardiac arrest with prompt CPR and defibrillation, started on IV Lidocaine/Amio and IABP ultimately replaced on 11/9. During this admission was found to be bacteremic for which she was treated for Staph epi, Enterococcus faecalis, Cutibacterium with IV abx.  Was listed Status 2, ABO A, PRA 0% (12/12).     A suitable donor was identified and on 12/25, he underwent OHT (ischemic Time: 253min, CMV -/+, Toxo -/-). The following day, extubated and IABP removed. Overall progressing well. Issues with constipation post-op, on needs aggressive bowel regimen. Transferred to stepdown 12/31. Plan for 1st EMBX next week on 1/2.  57 yo M with HFrEF (LVIDd 6.4 cm, LVEF 32%), CAD s/p PCI (2008), HTN, DMT2 (A1c 8.3%) and CVA s/p TPA (2018), recently treated for PNA who initially presented to [facility] via EMS after syncope reportedly requiring defibrillation. Treated for ACS but left AMA to come to [facility]. On arrival ECG with ST depression in lateral leads. Intubated 11/1 for respiratory failure and was found to have COVID. L/RHC 11/1revealing  of LAD and RCA, elevated filling pressures and CI of 1.5 prompting placement of IABP. Extubated 11/3. IABP was weaned to off 11/7, however the following day developed PMVT cardiac arrest with prompt CPR and defibrillation, started on IV Lidocaine/Amio and IABP ultimately replaced on 11/9. During this admission was found to be bacteremic for which she was treated for Staph epi, Enterococcus faecalis, Cutibacterium with IV abx.  Was listed Status 2, ABO A, PRA 0% (12/12).     A suitable donor was identified and on 12/25, he underwent OHT (ischemic Time: 253min, CMV -/+, Toxo -/-). The following day, extubated and IABP removed. Overall progressing well. Issues with constipation post-op, on needs aggressive bowel regimen. Transferred to stepdown 12/31. Plan for 1st EMBX next week on 1/2.

## 2024-01-02 ENCOUNTER — APPOINTMENT (OUTPATIENT)
Dept: CV DIAGNOSITCS | Facility: HOSPITAL | Age: 59
End: 2024-01-02

## 2024-01-02 ENCOUNTER — RESULT REVIEW (OUTPATIENT)
Age: 59
End: 2024-01-02

## 2024-01-02 LAB
A1C WITH ESTIMATED AVERAGE GLUCOSE RESULT: 6.1 % — HIGH (ref 4–5.6)
A1C WITH ESTIMATED AVERAGE GLUCOSE RESULT: 6.1 % — HIGH (ref 4–5.6)
ALBUMIN SERPL ELPH-MCNC: 4.4 G/DL — SIGNIFICANT CHANGE UP (ref 3.3–5)
ALBUMIN SERPL ELPH-MCNC: 4.4 G/DL — SIGNIFICANT CHANGE UP (ref 3.3–5)
ALP SERPL-CCNC: 54 U/L — SIGNIFICANT CHANGE UP (ref 40–120)
ALP SERPL-CCNC: 54 U/L — SIGNIFICANT CHANGE UP (ref 40–120)
ALT FLD-CCNC: 79 U/L — HIGH (ref 10–45)
ALT FLD-CCNC: 79 U/L — HIGH (ref 10–45)
ANION GAP SERPL CALC-SCNC: 13 MMOL/L — SIGNIFICANT CHANGE UP (ref 5–17)
ANION GAP SERPL CALC-SCNC: 13 MMOL/L — SIGNIFICANT CHANGE UP (ref 5–17)
APPEARANCE UR: CLEAR — SIGNIFICANT CHANGE UP
APPEARANCE UR: CLEAR — SIGNIFICANT CHANGE UP
AST SERPL-CCNC: 31 U/L — SIGNIFICANT CHANGE UP (ref 10–40)
AST SERPL-CCNC: 31 U/L — SIGNIFICANT CHANGE UP (ref 10–40)
BASOPHILS # BLD AUTO: 0.01 K/UL — SIGNIFICANT CHANGE UP (ref 0–0.2)
BASOPHILS # BLD AUTO: 0.01 K/UL — SIGNIFICANT CHANGE UP (ref 0–0.2)
BASOPHILS NFR BLD AUTO: 0.1 % — SIGNIFICANT CHANGE UP (ref 0–2)
BASOPHILS NFR BLD AUTO: 0.1 % — SIGNIFICANT CHANGE UP (ref 0–2)
BILIRUB SERPL-MCNC: 0.6 MG/DL — SIGNIFICANT CHANGE UP (ref 0.2–1.2)
BILIRUB SERPL-MCNC: 0.6 MG/DL — SIGNIFICANT CHANGE UP (ref 0.2–1.2)
BILIRUB UR-MCNC: NEGATIVE — SIGNIFICANT CHANGE UP
BILIRUB UR-MCNC: NEGATIVE — SIGNIFICANT CHANGE UP
BLD GP AB SCN SERPL QL: NEGATIVE — SIGNIFICANT CHANGE UP
BLD GP AB SCN SERPL QL: NEGATIVE — SIGNIFICANT CHANGE UP
BUN SERPL-MCNC: 37 MG/DL — HIGH (ref 7–23)
BUN SERPL-MCNC: 37 MG/DL — HIGH (ref 7–23)
CALCIUM SERPL-MCNC: 9.8 MG/DL — SIGNIFICANT CHANGE UP (ref 8.4–10.5)
CALCIUM SERPL-MCNC: 9.8 MG/DL — SIGNIFICANT CHANGE UP (ref 8.4–10.5)
CHLORIDE SERPL-SCNC: 91 MMOL/L — LOW (ref 96–108)
CHLORIDE SERPL-SCNC: 91 MMOL/L — LOW (ref 96–108)
CO2 SERPL-SCNC: 26 MMOL/L — SIGNIFICANT CHANGE UP (ref 22–31)
CO2 SERPL-SCNC: 26 MMOL/L — SIGNIFICANT CHANGE UP (ref 22–31)
COLOR SPEC: YELLOW — SIGNIFICANT CHANGE UP
COLOR SPEC: YELLOW — SIGNIFICANT CHANGE UP
CREAT SERPL-MCNC: 0.87 MG/DL — SIGNIFICANT CHANGE UP (ref 0.5–1.3)
CREAT SERPL-MCNC: 0.87 MG/DL — SIGNIFICANT CHANGE UP (ref 0.5–1.3)
DIFF PNL FLD: NEGATIVE — SIGNIFICANT CHANGE UP
DIFF PNL FLD: NEGATIVE — SIGNIFICANT CHANGE UP
EGFR: 99 ML/MIN/1.73M2 — SIGNIFICANT CHANGE UP
EGFR: 99 ML/MIN/1.73M2 — SIGNIFICANT CHANGE UP
EOSINOPHIL # BLD AUTO: 0.03 K/UL — SIGNIFICANT CHANGE UP (ref 0–0.5)
EOSINOPHIL # BLD AUTO: 0.03 K/UL — SIGNIFICANT CHANGE UP (ref 0–0.5)
EOSINOPHIL NFR BLD AUTO: 0.3 % — SIGNIFICANT CHANGE UP (ref 0–6)
EOSINOPHIL NFR BLD AUTO: 0.3 % — SIGNIFICANT CHANGE UP (ref 0–6)
ESTIMATED AVERAGE GLUCOSE: 128 MG/DL — HIGH (ref 68–114)
ESTIMATED AVERAGE GLUCOSE: 128 MG/DL — HIGH (ref 68–114)
GLUCOSE BLDC GLUCOMTR-MCNC: 110 MG/DL — HIGH (ref 70–99)
GLUCOSE BLDC GLUCOMTR-MCNC: 110 MG/DL — HIGH (ref 70–99)
GLUCOSE BLDC GLUCOMTR-MCNC: 116 MG/DL — HIGH (ref 70–99)
GLUCOSE BLDC GLUCOMTR-MCNC: 116 MG/DL — HIGH (ref 70–99)
GLUCOSE BLDC GLUCOMTR-MCNC: 144 MG/DL — HIGH (ref 70–99)
GLUCOSE BLDC GLUCOMTR-MCNC: 144 MG/DL — HIGH (ref 70–99)
GLUCOSE BLDC GLUCOMTR-MCNC: 205 MG/DL — HIGH (ref 70–99)
GLUCOSE BLDC GLUCOMTR-MCNC: 205 MG/DL — HIGH (ref 70–99)
GLUCOSE BLDC GLUCOMTR-MCNC: 218 MG/DL — HIGH (ref 70–99)
GLUCOSE BLDC GLUCOMTR-MCNC: 218 MG/DL — HIGH (ref 70–99)
GLUCOSE BLDC GLUCOMTR-MCNC: 267 MG/DL — HIGH (ref 70–99)
GLUCOSE BLDC GLUCOMTR-MCNC: 267 MG/DL — HIGH (ref 70–99)
GLUCOSE BLDC GLUCOMTR-MCNC: 269 MG/DL — HIGH (ref 70–99)
GLUCOSE BLDC GLUCOMTR-MCNC: 269 MG/DL — HIGH (ref 70–99)
GLUCOSE BLDC GLUCOMTR-MCNC: 281 MG/DL — HIGH (ref 70–99)
GLUCOSE BLDC GLUCOMTR-MCNC: 281 MG/DL — HIGH (ref 70–99)
GLUCOSE BLDC GLUCOMTR-MCNC: 393 MG/DL — HIGH (ref 70–99)
GLUCOSE BLDC GLUCOMTR-MCNC: 393 MG/DL — HIGH (ref 70–99)
GLUCOSE BLDC GLUCOMTR-MCNC: 93 MG/DL — SIGNIFICANT CHANGE UP (ref 70–99)
GLUCOSE BLDC GLUCOMTR-MCNC: 93 MG/DL — SIGNIFICANT CHANGE UP (ref 70–99)
GLUCOSE SERPL-MCNC: 191 MG/DL — HIGH (ref 70–99)
GLUCOSE SERPL-MCNC: 191 MG/DL — HIGH (ref 70–99)
GLUCOSE UR QL: >=1000 MG/DL
GLUCOSE UR QL: >=1000 MG/DL
HCT VFR BLD CALC: 34.5 % — LOW (ref 39–50)
HCT VFR BLD CALC: 34.5 % — LOW (ref 39–50)
HGB BLD-MCNC: 11.9 G/DL — LOW (ref 13–17)
HGB BLD-MCNC: 11.9 G/DL — LOW (ref 13–17)
HGB FLD-MCNC: 11.4 G/DL — LOW (ref 12.6–17.4)
HGB FLD-MCNC: 11.4 G/DL — LOW (ref 12.6–17.4)
IMM GRANULOCYTES NFR BLD AUTO: 1 % — HIGH (ref 0–0.9)
IMM GRANULOCYTES NFR BLD AUTO: 1 % — HIGH (ref 0–0.9)
KETONES UR-MCNC: NEGATIVE MG/DL — SIGNIFICANT CHANGE UP
KETONES UR-MCNC: NEGATIVE MG/DL — SIGNIFICANT CHANGE UP
LEUKOCYTE ESTERASE UR-ACNC: NEGATIVE — SIGNIFICANT CHANGE UP
LEUKOCYTE ESTERASE UR-ACNC: NEGATIVE — SIGNIFICANT CHANGE UP
LYMPHOCYTES # BLD AUTO: 2.02 K/UL — SIGNIFICANT CHANGE UP (ref 1–3.3)
LYMPHOCYTES # BLD AUTO: 2.02 K/UL — SIGNIFICANT CHANGE UP (ref 1–3.3)
LYMPHOCYTES # BLD AUTO: 20.4 % — SIGNIFICANT CHANGE UP (ref 13–44)
LYMPHOCYTES # BLD AUTO: 20.4 % — SIGNIFICANT CHANGE UP (ref 13–44)
MAGNESIUM SERPL-MCNC: 1.5 MG/DL — LOW (ref 1.6–2.6)
MAGNESIUM SERPL-MCNC: 1.5 MG/DL — LOW (ref 1.6–2.6)
MCHC RBC-ENTMCNC: 30.2 PG — SIGNIFICANT CHANGE UP (ref 27–34)
MCHC RBC-ENTMCNC: 30.2 PG — SIGNIFICANT CHANGE UP (ref 27–34)
MCHC RBC-ENTMCNC: 34.5 GM/DL — SIGNIFICANT CHANGE UP (ref 32–36)
MCHC RBC-ENTMCNC: 34.5 GM/DL — SIGNIFICANT CHANGE UP (ref 32–36)
MCV RBC AUTO: 87.6 FL — SIGNIFICANT CHANGE UP (ref 80–100)
MCV RBC AUTO: 87.6 FL — SIGNIFICANT CHANGE UP (ref 80–100)
MONOCYTES # BLD AUTO: 0.59 K/UL — SIGNIFICANT CHANGE UP (ref 0–0.9)
MONOCYTES # BLD AUTO: 0.59 K/UL — SIGNIFICANT CHANGE UP (ref 0–0.9)
MONOCYTES NFR BLD AUTO: 6 % — SIGNIFICANT CHANGE UP (ref 2–14)
MONOCYTES NFR BLD AUTO: 6 % — SIGNIFICANT CHANGE UP (ref 2–14)
NEUTROPHILS # BLD AUTO: 7.13 K/UL — SIGNIFICANT CHANGE UP (ref 1.8–7.4)
NEUTROPHILS # BLD AUTO: 7.13 K/UL — SIGNIFICANT CHANGE UP (ref 1.8–7.4)
NEUTROPHILS NFR BLD AUTO: 72.2 % — SIGNIFICANT CHANGE UP (ref 43–77)
NEUTROPHILS NFR BLD AUTO: 72.2 % — SIGNIFICANT CHANGE UP (ref 43–77)
NITRITE UR-MCNC: NEGATIVE — SIGNIFICANT CHANGE UP
NITRITE UR-MCNC: NEGATIVE — SIGNIFICANT CHANGE UP
NRBC # BLD: 0 /100 WBCS — SIGNIFICANT CHANGE UP (ref 0–0)
NRBC # BLD: 0 /100 WBCS — SIGNIFICANT CHANGE UP (ref 0–0)
OXYHGB MFR BLDMV: 65 % — LOW (ref 90–95)
OXYHGB MFR BLDMV: 65 % — LOW (ref 90–95)
PH UR: 6 — SIGNIFICANT CHANGE UP (ref 5–8)
PH UR: 6 — SIGNIFICANT CHANGE UP (ref 5–8)
PHOSPHATE SERPL-MCNC: 2.6 MG/DL — SIGNIFICANT CHANGE UP (ref 2.5–4.5)
PHOSPHATE SERPL-MCNC: 2.6 MG/DL — SIGNIFICANT CHANGE UP (ref 2.5–4.5)
PLATELET # BLD AUTO: 221 K/UL — SIGNIFICANT CHANGE UP (ref 150–400)
PLATELET # BLD AUTO: 221 K/UL — SIGNIFICANT CHANGE UP (ref 150–400)
POTASSIUM SERPL-MCNC: 5.2 MMOL/L — SIGNIFICANT CHANGE UP (ref 3.5–5.3)
POTASSIUM SERPL-MCNC: 5.2 MMOL/L — SIGNIFICANT CHANGE UP (ref 3.5–5.3)
POTASSIUM SERPL-SCNC: 5.2 MMOL/L — SIGNIFICANT CHANGE UP (ref 3.5–5.3)
POTASSIUM SERPL-SCNC: 5.2 MMOL/L — SIGNIFICANT CHANGE UP (ref 3.5–5.3)
PROT SERPL-MCNC: 6.9 G/DL — SIGNIFICANT CHANGE UP (ref 6–8.3)
PROT SERPL-MCNC: 6.9 G/DL — SIGNIFICANT CHANGE UP (ref 6–8.3)
PROT UR-MCNC: NEGATIVE MG/DL — SIGNIFICANT CHANGE UP
PROT UR-MCNC: NEGATIVE MG/DL — SIGNIFICANT CHANGE UP
RBC # BLD: 3.94 M/UL — LOW (ref 4.2–5.8)
RBC # BLD: 3.94 M/UL — LOW (ref 4.2–5.8)
RBC # FLD: 15.1 % — HIGH (ref 10.3–14.5)
RBC # FLD: 15.1 % — HIGH (ref 10.3–14.5)
RH IG SCN BLD-IMP: POSITIVE — SIGNIFICANT CHANGE UP
RH IG SCN BLD-IMP: POSITIVE — SIGNIFICANT CHANGE UP
SAO2 % BLD: 66.7 % — SIGNIFICANT CHANGE UP (ref 60–90)
SAO2 % BLD: 66.7 % — SIGNIFICANT CHANGE UP (ref 60–90)
SODIUM SERPL-SCNC: 130 MMOL/L — LOW (ref 135–145)
SODIUM SERPL-SCNC: 130 MMOL/L — LOW (ref 135–145)
SP GR SPEC: 1.01 — SIGNIFICANT CHANGE UP (ref 1–1.03)
SP GR SPEC: 1.01 — SIGNIFICANT CHANGE UP (ref 1–1.03)
TACROLIMUS SERPL-MCNC: 8.9 NG/ML — SIGNIFICANT CHANGE UP
TACROLIMUS SERPL-MCNC: 8.9 NG/ML — SIGNIFICANT CHANGE UP
UROBILINOGEN FLD QL: 0.2 MG/DL — SIGNIFICANT CHANGE UP (ref 0.2–1)
UROBILINOGEN FLD QL: 0.2 MG/DL — SIGNIFICANT CHANGE UP (ref 0.2–1)
WBC # BLD: 9.88 K/UL — SIGNIFICANT CHANGE UP (ref 3.8–10.5)
WBC # BLD: 9.88 K/UL — SIGNIFICANT CHANGE UP (ref 3.8–10.5)
WBC # FLD AUTO: 9.88 K/UL — SIGNIFICANT CHANGE UP (ref 3.8–10.5)
WBC # FLD AUTO: 9.88 K/UL — SIGNIFICANT CHANGE UP (ref 3.8–10.5)

## 2024-01-02 PROCEDURE — 93308 TTE F-UP OR LMTD: CPT | Mod: 26

## 2024-01-02 PROCEDURE — 99232 SBSQ HOSP IP/OBS MODERATE 35: CPT

## 2024-01-02 PROCEDURE — 93321 DOPPLER ECHO F-UP/LMTD STD: CPT | Mod: 26,77

## 2024-01-02 PROCEDURE — 88307 TISSUE EXAM BY PATHOLOGIST: CPT | Mod: 26

## 2024-01-02 PROCEDURE — 99152 MOD SED SAME PHYS/QHP 5/>YRS: CPT

## 2024-01-02 PROCEDURE — 88346 IMFLUOR 1ST 1ANTB STAIN PX: CPT | Mod: 26

## 2024-01-02 PROCEDURE — 71045 X-RAY EXAM CHEST 1 VIEW: CPT | Mod: 26

## 2024-01-02 PROCEDURE — 90791 PSYCH DIAGNOSTIC EVALUATION: CPT

## 2024-01-02 PROCEDURE — 93308 TTE F-UP OR LMTD: CPT | Mod: 26,77

## 2024-01-02 PROCEDURE — 93505 ENDOMYOCARDIAL BIOPSY: CPT | Mod: 26

## 2024-01-02 PROCEDURE — 99233 SBSQ HOSP IP/OBS HIGH 50: CPT

## 2024-01-02 PROCEDURE — 93321 DOPPLER ECHO F-UP/LMTD STD: CPT | Mod: 26

## 2024-01-02 RX ORDER — INSULIN GLARGINE 100 [IU]/ML
5 INJECTION, SOLUTION SUBCUTANEOUS ONCE
Refills: 0 | Status: COMPLETED | OUTPATIENT
Start: 2024-01-02 | End: 2024-01-02

## 2024-01-02 RX ORDER — OLANZAPINE 15 MG/1
5 TABLET, FILM COATED ORAL AT BEDTIME
Refills: 0 | Status: DISCONTINUED | OUTPATIENT
Start: 2024-01-02 | End: 2024-01-09

## 2024-01-02 RX ORDER — INSULIN GLARGINE 100 [IU]/ML
25 INJECTION, SOLUTION SUBCUTANEOUS
Refills: 0 | Status: DISCONTINUED | OUTPATIENT
Start: 2024-01-03 | End: 2024-01-03

## 2024-01-02 RX ORDER — INSULIN LISPRO 100/ML
14 VIAL (ML) SUBCUTANEOUS
Refills: 0 | Status: DISCONTINUED | OUTPATIENT
Start: 2024-01-02 | End: 2024-01-03

## 2024-01-02 RX ORDER — MAGNESIUM SULFATE 500 MG/ML
2 VIAL (ML) INJECTION ONCE
Refills: 0 | Status: COMPLETED | OUTPATIENT
Start: 2024-01-02 | End: 2024-01-02

## 2024-01-02 RX ORDER — SODIUM CHLORIDE 5 G/100ML
150 INJECTION, SOLUTION INTRAVENOUS ONCE
Refills: 0 | Status: COMPLETED | OUTPATIENT
Start: 2024-01-02 | End: 2024-01-02

## 2024-01-02 RX ORDER — MAGNESIUM OXIDE 400 MG ORAL TABLET 241.3 MG
400 TABLET ORAL
Refills: 0 | Status: DISCONTINUED | OUTPATIENT
Start: 2024-01-02 | End: 2024-01-09

## 2024-01-02 RX ORDER — LANOLIN ALCOHOL/MO/W.PET/CERES
5 CREAM (GRAM) TOPICAL AT BEDTIME
Refills: 0 | Status: DISCONTINUED | OUTPATIENT
Start: 2024-01-02 | End: 2024-01-09

## 2024-01-02 RX ADMIN — BUMETANIDE 1 MILLIGRAM(S): 0.25 INJECTION INTRAMUSCULAR; INTRAVENOUS at 17:25

## 2024-01-02 RX ADMIN — VALGANCICLOVIR 450 MILLIGRAM(S): 450 TABLET, FILM COATED ORAL at 19:40

## 2024-01-02 RX ADMIN — Medication 650 MILLIGRAM(S): at 06:53

## 2024-01-02 RX ADMIN — TACROLIMUS 6 MILLIGRAM(S): 5 CAPSULE ORAL at 08:01

## 2024-01-02 RX ADMIN — SODIUM CHLORIDE 300 MILLILITER(S): 5 INJECTION, SOLUTION INTRAVENOUS at 13:31

## 2024-01-02 RX ADMIN — Medication 10 MILLIGRAM(S): at 06:36

## 2024-01-02 RX ADMIN — Medication 650 MILLIGRAM(S): at 17:25

## 2024-01-02 RX ADMIN — Medication 650 MILLIGRAM(S): at 17:39

## 2024-01-02 RX ADMIN — Medication 50 MILLIGRAM(S): at 06:37

## 2024-01-02 RX ADMIN — BUDESONIDE AND FORMOTEROL FUMARATE DIHYDRATE 1 PUFF(S): 160; 4.5 AEROSOL RESPIRATORY (INHALATION) at 08:01

## 2024-01-02 RX ADMIN — Medication 650 MILLIGRAM(S): at 11:06

## 2024-01-02 RX ADMIN — Medication 100 MILLIGRAM(S): at 13:19

## 2024-01-02 RX ADMIN — MAGNESIUM OXIDE 400 MG ORAL TABLET 400 MILLIGRAM(S): 241.3 TABLET ORAL at 17:26

## 2024-01-02 RX ADMIN — Medication 60 MILLIGRAM(S): at 17:26

## 2024-01-02 RX ADMIN — POLYETHYLENE GLYCOL 3350 17 GRAM(S): 17 POWDER, FOR SOLUTION ORAL at 11:07

## 2024-01-02 RX ADMIN — Medication 10 MILLIGRAM(S): at 21:42

## 2024-01-02 RX ADMIN — Medication 50 MILLIGRAM(S): at 13:23

## 2024-01-02 RX ADMIN — Medication 50 MILLIGRAM(S): at 21:42

## 2024-01-02 RX ADMIN — Medication 100 MILLIGRAM(S): at 21:42

## 2024-01-02 RX ADMIN — Medication 6: at 17:21

## 2024-01-02 RX ADMIN — Medication 650 MILLIGRAM(S): at 06:36

## 2024-01-02 RX ADMIN — Medication 10 MILLIGRAM(S): at 13:19

## 2024-01-02 RX ADMIN — Medication 14 UNIT(S): at 17:21

## 2024-01-02 RX ADMIN — BUMETANIDE 1 MILLIGRAM(S): 0.25 INJECTION INTRAMUSCULAR; INTRAVENOUS at 11:06

## 2024-01-02 RX ADMIN — SENNA PLUS 2 TABLET(S): 8.6 TABLET ORAL at 21:42

## 2024-01-02 RX ADMIN — BUDESONIDE AND FORMOTEROL FUMARATE DIHYDRATE 1 PUFF(S): 160; 4.5 AEROSOL RESPIRATORY (INHALATION) at 17:39

## 2024-01-02 RX ADMIN — Medication 650 MILLIGRAM(S): at 00:20

## 2024-01-02 RX ADMIN — Medication 650 MILLIGRAM(S): at 11:16

## 2024-01-02 RX ADMIN — MAGNESIUM OXIDE 400 MG ORAL TABLET 400 MILLIGRAM(S): 241.3 TABLET ORAL at 11:19

## 2024-01-02 RX ADMIN — PANTOPRAZOLE SODIUM 40 MILLIGRAM(S): 20 TABLET, DELAYED RELEASE ORAL at 11:06

## 2024-01-02 RX ADMIN — INSULIN GLARGINE 20 UNIT(S): 100 INJECTION, SOLUTION SUBCUTANEOUS at 11:23

## 2024-01-02 RX ADMIN — Medication 25 GRAM(S): at 11:20

## 2024-01-02 RX ADMIN — Medication 12 MILLIGRAM(S): at 17:22

## 2024-01-02 RX ADMIN — BUMETANIDE 1 MILLIGRAM(S): 0.25 INJECTION INTRAMUSCULAR; INTRAVENOUS at 06:36

## 2024-01-02 RX ADMIN — FLUCONAZOLE 100 MILLIGRAM(S): 150 TABLET ORAL at 11:06

## 2024-01-02 RX ADMIN — TACROLIMUS 6 MILLIGRAM(S): 5 CAPSULE ORAL at 19:40

## 2024-01-02 RX ADMIN — Medication 100 MILLIGRAM(S): at 06:37

## 2024-01-02 RX ADMIN — INSULIN GLARGINE 5 UNIT(S): 100 INJECTION, SOLUTION SUBCUTANEOUS at 13:23

## 2024-01-02 RX ADMIN — Medication 16 MILLIGRAM(S): at 06:36

## 2024-01-02 RX ADMIN — Medication 60 MILLIGRAM(S): at 06:37

## 2024-01-02 RX ADMIN — Medication 4: at 08:16

## 2024-01-02 RX ADMIN — VALGANCICLOVIR 450 MILLIGRAM(S): 450 TABLET, FILM COATED ORAL at 08:01

## 2024-01-02 RX ADMIN — NALOXEGOL OXALATE 25 MILLIGRAM(S): 12.5 TABLET, FILM COATED ORAL at 11:07

## 2024-01-02 RX ADMIN — Medication 6: at 11:32

## 2024-01-02 RX ADMIN — MYCOPHENOLATE MOFETIL 1000 MILLIGRAM(S): 250 CAPSULE ORAL at 08:01

## 2024-01-02 RX ADMIN — MYCOPHENOLATE MOFETIL 1000 MILLIGRAM(S): 250 CAPSULE ORAL at 19:40

## 2024-01-02 RX ADMIN — Medication 1 TABLET(S): at 11:08

## 2024-01-02 RX ADMIN — Medication 10 UNIT(S): at 11:32

## 2024-01-02 NOTE — PROGRESS NOTE ADULT - SUBJECTIVE AND OBJECTIVE BOX
Seen earlier today     Chief Complaint: Diabetes Mellitus follow up    INTERVAL HX: Off insulin gtt since 1 am.  at 730 this am. Tolerating POs. Noted he required total 50 units of insulin gtt yesterday and received 20 units of Lantus. Discussed about consistent carbohydrate diet       Review of Systems:  General: As above  GI: No nausea, vomiting  Endocrine: no  S&Sx of hypoglycemia    Allergies    penicillins (Unknown)    Intolerances      MEDICATIONS  (STANDING):  acetaminophen     Tablet .. 650 milliGRAM(s) Oral every 6 hours  budesonide  80 MICROgram(s)/formoterol 4.5 MICROgram(s) Inhaler 1 Puff(s) Inhalation two times a day  buMETAnide Injectable 1 milliGRAM(s) IV Push <User Schedule>  chlorhexidine 4% Liquid 1 Application(s) Topical <User Schedule>  dextrose 5%. 1000 milliLiter(s) (50 mL/Hr) IV Continuous <Continuous>  dextrose 5%. 1000 milliLiter(s) (100 mL/Hr) IV Continuous <Continuous>  dextrose 50% Injectable 50 milliLiter(s) IV Push every 15 minutes  fluconAZOLE   Tablet 100 milliGRAM(s) Oral <User Schedule>  glucagon  Injectable 1 milliGRAM(s) IntraMuscular once  hydrALAZINE 100 milliGRAM(s) Oral every 8 hours  insulin lispro (ADMELOG) corrective regimen sliding scale   SubCutaneous at bedtime  insulin lispro (ADMELOG) corrective regimen sliding scale   SubCutaneous three times a day before meals  insulin lispro Injectable (ADMELOG) 10 Unit(s) SubCutaneous three times a day before meals  lidocaine   4% Patch 3 Patch Transdermal daily  magnesium oxide 400 milliGRAM(s) Oral three times a day with meals  methylPREDNISolone sodium succinate Injectable   IV Push   methylPREDNISolone sodium succinate Injectable 12 milliGRAM(s) IV Push every 12 hours  metoclopramide Injectable 10 milliGRAM(s) IV Push every 8 hours  mycophenolate mofetil 1000 milliGRAM(s) Oral every 12 hours  naloxegol 25 milliGRAM(s) Oral daily  NIFEdipine XL 60 milliGRAM(s) Oral every 12 hours  pantoprazole  Injectable 40 milliGRAM(s) IV Push daily  polyethylene glycol 3350 17 Gram(s) Oral daily  pregabalin 50 milliGRAM(s) Oral every 8 hours  senna 2 Tablet(s) Oral at bedtime  tacrolimus 6 milliGRAM(s) Oral <User Schedule>  trimethoprim   80 mG/sulfamethoxazole 400 mG 1 Tablet(s) Oral daily  valGANciclovir 450 milliGRAM(s) Oral every 12 hours        insulin glargine Injectable (LANTUS)   5 Unit(s) SubCutaneous (01-02-24 @ 13:23)    insulin lispro (ADMELOG) corrective regimen sliding scale   6 Unit(s) SubCutaneous (01-02-24 @ 11:32)   4 Unit(s) SubCutaneous (01-02-24 @ 08:16)    insulin lispro Injectable (ADMELOG)   10 Unit(s) SubCutaneous (01-02-24 @ 11:32)    methylPREDNISolone sodium succinate Injectable   16 milliGRAM(s) IV Push (01-02-24 @ 06:36)   16 milliGRAM(s) IV Push (01-01-24 @ 18:41)        PHYSICAL EXAM:  VITALS: T(C): 36.7 (01-02-24 @ 15:14)  T(F): 98 (01-02-24 @ 15:14), Max: 98.7 (01-02-24 @ 07:06)  HR: 99 (01-02-24 @ 15:14) (93 - 101)  BP: 123/68 (01-02-24 @ 15:14) (105/71 - 127/70)  RR:  (16 - 18)  SpO2:  (94% - 97%)  Wt(kg): --  GENERAL: Male laying in bed, NAD  Respiratory: Respirations unlabored   Extremities: Warm, no edema  NEURO: Alert , appropriate     LABS:  POCT Blood Glucose.: 269 mg/dL (01-02-24 @ 11:18)  POCT Blood Glucose.: 205 mg/dL (01-02-24 @ 08:12)  POCT Blood Glucose.: 144 mg/dL (01-02-24 @ 04:16)  POCT Blood Glucose.: 110 mg/dL (01-02-24 @ 02:49)  POCT Blood Glucose.: 93 mg/dL (01-02-24 @ 02:10)  POCT Blood Glucose.: 116 mg/dL (01-02-24 @ 01:17)  POCT Blood Glucose.: 174 mg/dL (01-01-24 @ 23:54)  POCT Blood Glucose.: 174 mg/dL (01-01-24 @ 22:29)  POCT Blood Glucose.: 131 mg/dL (01-01-24 @ 21:42)  POCT Blood Glucose.: 99 mg/dL (01-01-24 @ 21:07)  POCT Blood Glucose.: 182 mg/dL (01-01-24 @ 19:35)  POCT Blood Glucose.: 155 mg/dL (01-01-24 @ 18:33)  POCT Blood Glucose.: 176 mg/dL (01-01-24 @ 17:31)  POCT Blood Glucose.: 229 mg/dL (01-01-24 @ 16:33)  POCT Blood Glucose.: 308 mg/dL (01-01-24 @ 15:33)  POCT Blood Glucose.: 360 mg/dL (01-01-24 @ 14:35)  POCT Blood Glucose.: 570 mg/dL (01-01-24 @ 13:15)  POCT Blood Glucose.: 534 mg/dL (01-01-24 @ 13:13)  POCT Blood Glucose.: 185 mg/dL (01-01-24 @ 08:10)  POCT Blood Glucose.: 122 mg/dL (01-01-24 @ 06:18)  POCT Blood Glucose.: 92 mg/dL (01-01-24 @ 05:22)  POCT Blood Glucose.: 89 mg/dL (01-01-24 @ 04:35)  POCT Blood Glucose.: 120 mg/dL (01-01-24 @ 03:13)  POCT Blood Glucose.: 141 mg/dL (01-01-24 @ 02:11)  POCT Blood Glucose.: 166 mg/dL (01-01-24 @ 01:20)  POCT Blood Glucose.: 193 mg/dL (01-01-24 @ 00:23)  POCT Blood Glucose.: 249 mg/dL (12-31-23 @ 22:52)  POCT Blood Glucose.: 203 mg/dL (12-31-23 @ 21:31)  POCT Blood Glucose.: 103 mg/dL (12-31-23 @ 20:28)  POCT Blood Glucose.: 67 mg/dL (12-31-23 @ 20:04)  POCT Blood Glucose.: 104 mg/dL (12-31-23 @ 19:04)  POCT Blood Glucose.: 104 mg/dL (12-31-23 @ 18:06)  POCT Blood Glucose.: 129 mg/dL (12-31-23 @ 17:12)  POCT Blood Glucose.: 202 mg/dL (12-31-23 @ 16:08)  POCT Blood Glucose.: 201 mg/dL (12-31-23 @ 14:58)  POCT Blood Glucose.: 158 mg/dL (12-31-23 @ 13:42)  POCT Blood Glucose.: 84 mg/dL (12-31-23 @ 12:41)  POCT Blood Glucose.: 110 mg/dL (12-31-23 @ 12:08)  POCT Blood Glucose.: 119 mg/dL (12-31-23 @ 11:13)  POCT Blood Glucose.: 130 mg/dL (12-31-23 @ 10:15)  POCT Blood Glucose.: 190 mg/dL (12-31-23 @ 09:19)  POCT Blood Glucose.: 160 mg/dL (12-31-23 @ 08:19)  POCT Blood Glucose.: 160 mg/dL (12-31-23 @ 06:39)  POCT Blood Glucose.: 137 mg/dL (12-31-23 @ 05:53)  POCT Blood Glucose.: 146 mg/dL (12-31-23 @ 04:50)  POCT Blood Glucose.: 162 mg/dL (12-31-23 @ 03:57)  POCT Blood Glucose.: 107 mg/dL (12-31-23 @ 01:55)  POCT Blood Glucose.: 114 mg/dL (12-31-23 @ 01:02)  POCT Blood Glucose.: 119 mg/dL (12-30-23 @ 23:58)  POCT Blood Glucose.: 126 mg/dL (12-30-23 @ 22:58)  POCT Blood Glucose.: 143 mg/dL (12-30-23 @ 21:50)  POCT Blood Glucose.: 165 mg/dL (12-30-23 @ 20:56)  POCT Blood Glucose.: 181 mg/dL (12-30-23 @ 19:49)  POCT Blood Glucose.: 179 mg/dL (12-30-23 @ 18:42)  POCT Blood Glucose.: 197 mg/dL (12-30-23 @ 18:04)  POCT Blood Glucose.: 244 mg/dL (12-30-23 @ 17:30)                          11.9   9.88  )-----------( 221      ( 02 Jan 2024 07:26 )             34.5     01-02    130<L>  |  91<L>  |  37<H>  ----------------------------<  191<H>  5.2   |  26  |  0.87    Ca    9.8      02 Jan 2024 07:26  Phos  2.6     01-02  Mg     1.5     01-02    TPro  6.9  /  Alb  4.4  /  TBili  0.6  /  DBili  x   /  AST  31  /  ALT  79<H>  /  AlkPhos  54  01-02    eGFR: 99 mL/min/1.73m2 (02 Jan 2024 07:26)    11-10 Chol 130 Direct LDL -- LDL calculated 75 HDL 37<L> Trig 95, 11-03 Chol 198 Direct LDL -- LDL calculated 114<H> HDL 24<L> Trig 344<H>  Thyroid Function Tests:      A1C with Estimated Average Glucose Result: 6.1 % (01-02-24 @ 07:26)  A1C with Estimated Average Glucose Result: 8.3 % (11-01-23 @ 06:14)    Estimated Average Glucose: 128 mg/dL (01-02-24 @ 07:26)  Estimated Average Glucose: 192 mg/dL (11-01-23 @ 06:14)        Diet, Consistent Carbohydrate/No Snacks (12-27-23 @ 11:00) [Active]

## 2024-01-02 NOTE — PROGRESS NOTE ADULT - PROBLEM SELECTOR PLAN 1
- s/p OHT on 12/25. Ischemic Time: 253min  - IABP removed 12/26  - Sabrina and Epi weaned off 12/26  - Milrinone off  - Procardia XL 60 mg BID  - Target net negative 1-2L balance. Bumex 1mg IV TID  - 1st RHC/EMBx on Tuesday 1/2  - Cardura 2mg tonight. Hold Hydral tonight   - Remove A line  - Hold Hepatin gtt at midnight for biopsy tomorrow   - Please keep primary Dr. Banuelos [phone] in the loop per family request. - s/p OHT on 12/25. Ischemic Time: 253min  - IABP removed 12/26  - Sabrina and Epi weaned off 12/26  - Milrinone off  - Procardia XL 60 mg BID  - Target net negative 1-2L balance. Bumex 1mg IV TID  - 1st RHC/EMBx on Tuesday 1/2  - Cardura 2mg tonight. Hold Hydral tonight   - Remove A line  - Hold Hepatin gtt at midnight for biopsy tomorrow   - Please keep primary Dr. Banuelos [phone] in the loop per family request. - s/p OHT on 12/25. Ischemic Time: 253min  - IABP removed 12/26  - continue with Procardia XL 60 mg BID and hydralazine 100 mg PO TID  - continue with Bumex 1 mg IV TID and will be given hypertonic saline today   - underwent RHC/EMBx today, awaiting results   - Please keep primary Dr. Banuelos [phone] in the loop per family request. - s/p OHT on 12/25. Ischemic Time: 253min  - IABP removed 12/26  - continue with Procardia XL 60 mg BID and hydralazine 100 mg PO TID  - continue with Bumex 1 mg IV TID and will be given hypertonic saline today   - underwent RHC/EMBx today, awaiting results   - Please keep primary Dr. Banuelos [phone] in the loop per family request.

## 2024-01-02 NOTE — PROGRESS NOTE ADULT - ASSESSMENT
59yo M w/ PMHx HTN, CAD s/p stent (2009), ICH (2008), ICM now s/p heart transplant on 12/25/2023. Endocrine consulted for T2DM management.  Tolerating POs with good oral intake. On Methylprednisolone taper as below. Patient with variable insulin ggt requirements, at times requiring 3-5 units/hr, other times ggt is off per review of documentation. BG .  Complex patient, high level decision making.    Steroid schedule  12/28 Methylprednisolone 32 units BID   12/29 Methylprednisolone 28 units BID  12/30 Methylprednisolone 24 units BID   12/31 Methylprednisolone 20 units BID   1/1 Methylprednisolone 16 units BID   1/2 Methylprednisolone 12 units BID X 14 days     #T2DM (A1c  8.3%), uncontrolled with complications   #Steroid induced hyperglycemia   Home meds: confirmed with patient on 12/28 patient takes dapagliflozin 10mg daily, patient is NOT taking ozempic any longer (has not taken in over 4 months)    His insulin requirement on insulin gtt variable 0-5 units, total requirement last 24 hours 50 units and received Lantus 20 units around noon yesterday    Recommendations:   - Test BGs ACTID, at HS and 2 am   - Increase Lantus to 25 units ( 20 units given already + lantus 5 units SQ X1 stat ordered )  - Continue premeal Admelog 10 units before each measl ( Hold if NPO)   - Continue Moderate correction scale ACTID and sperate Moderate correction scale at HS and 2 am   - recommend Nutrition consult   - Please  keep hypoglycemia protocol in place   - Carb consistent diet   - please inform endocrine if any changes to steroid taper as this will impact insulin requirements   - BG goal 100 to 180mg/dl   - DISCHARGE RECOMMENDATIONS: depending on steroid regimen on discharge and insulin requirements basal-bolus regimen, TBD.  - OUT-PATIENT FOLLOW UP: Patient should follow up with PCP vs Endocrinology depending on insulin requirement (pt does not have endocrinolgist)    #HLD  - patient not on statin  - goal LDL in diabetes mellitus is <70  - outpatient fasting lipid profile     #HTN  - patient on losartan 25mg daily at home  - goal BP in diabetes mellitus is <130/80  - defer to primary team for management    Jessi Jorgensen NP-C   Contact via [employer] Teams during business hours  To reach covering provider access AMION via [employer]  For Urgent matters/after-hours/weekends/holidays please page endocrine fellow on call   For nonurgent matters please email [email or web address]    Please note that this patient may be followed by different provider tomorrow.  Notify endocrine 24 hours prior to discharge for final recommendations 59yo M w/ PMHx HTN, CAD s/p stent (2009), ICH (2008), ICM now s/p heart transplant on 12/25/2023. Endocrine consulted for T2DM management.  Tolerating POs with good oral intake. On Methylprednisolone taper as below. Patient with variable insulin ggt requirements, at times requiring 3-5 units/hr, other times ggt is off per review of documentation. BG .  Complex patient, high level decision making.    Steroid schedule  12/28 Methylprednisolone 32 units BID   12/29 Methylprednisolone 28 units BID  12/30 Methylprednisolone 24 units BID   12/31 Methylprednisolone 20 units BID   1/1 Methylprednisolone 16 units BID   1/2 Methylprednisolone 12 units BID X 14 days     #T2DM (A1c  8.3%), uncontrolled with complications   #Steroid induced hyperglycemia   Home meds: confirmed with patient on 12/28 patient takes dapagliflozin 10mg daily, patient is NOT taking ozempic any longer (has not taken in over 4 months)    His insulin requirement on insulin gtt variable 0-5 units, total requirement last 24 hours 50 units and received Lantus 20 units around noon yesterday    Recommendations:   - Test BGs ACTID, at HS and 2 am   - Increase Lantus to 25 units ( 20 units given already + lantus 5 units SQ X1 stat ordered )  - Continue premeal Admelog 10 units before each measl ( Hold if NPO)   - Continue Moderate correction scale ACTID and sperate Moderate correction scale at HS and 2 am   - recommend Nutrition consult   - Please  keep hypoglycemia protocol in place   - Carb consistent diet   - please inform endocrine if any changes to steroid taper as this will impact insulin requirements   - BG goal 100 to 180mg/dl   - DISCHARGE RECOMMENDATIONS: depending on steroid regimen on discharge and insulin requirements basal-bolus regimen, TBD.  - OUT-PATIENT FOLLOW UP: Patient should follow up with PCP vs Endocrinology depending on insulin requirement (pt does not have endocrinolgist)    #HLD  - patient not on statin  - goal LDL in diabetes mellitus is <70  - outpatient fasting lipid profile     #HTN  - patient on losartan 25mg daily at home  - goal BP in diabetes mellitus is <130/80  - defer to primary team for management    Jessi Jorgensen NP-C   Contact via [employer] Teams during business hours  To reach covering provider access AMION via [employer]  For Urgent matters/after-hours/weekends/holidays please page endocrine fellow on call   For nonurgent matters please email [email or web address]    Please note that this patient may be followed by different provider tomorrow.  Notify endocrine 24 hours prior to discharge for final recommendations 57yo M w/ PMHx HTN, CAD s/p stent (2009), ICH (2008), ICM now s/p heart transplant on 12/25/2023. Endocrine consulted for T2DM management.  Tolerating POs with good oral intake. On Methylprednisolone taper as below. Patient with variable insulin ggt requirements, requiring 0-4 units, off since 1 am and was NPO after MN for Bx today. Insulin requirement on gtt was 50 units last 24 hours and received Lantus 20 units yesterday, fasting  in am .    Steroid schedule  12/28 Methylprednisolone 32 units BID   12/29 Methylprednisolone 28 units BID  12/30 Methylprednisolone 24 units BID   12/31 Methylprednisolone 20 units BID   1/1 Methylprednisolone 16 units BID   1/2 Methylprednisolone 12 units BID X 14 days     #T2DM (A1c  8.3%), uncontrolled with complications   #Steroid induced hyperglycemia   Home meds: confirmed with patient on 12/28 patient takes dapagliflozin 10mg daily, patient is NOT taking ozempic any longer (has not taken in over 4 months)    His insulin requirement on insulin gtt variable 0-5 units, total requirement last 24 hours 50 units and received Lantus 20 units around noon yesterday    Recommendations:   - Test BGs ACTID, at HS and 2 am   - Increase Lantus to 25 units ( 20 units given already + lantus 5 units SQ X1 stat ordered )  - Increase premeal Admelog 14 units before each measl ( Hold if NPO)   - Continue Moderate correction scale ACTID and sperate Moderate correction scale at HS and 2 am   - recommend Nutrition consult   - Please  keep hypoglycemia protocol in place   - Carb consistent diet   - please inform endocrine if any changes to steroid taper as this will impact insulin requirements   - BG goal 100 to 180mg/dl   - DISCHARGE RECOMMENDATIONS: depending on steroid regimen on discharge and insulin requirements basal-bolus regimen, TBD.  - OUT-PATIENT FOLLOW UP: Patient should follow up with PCP vs Endocrinology depending on insulin requirement (pt does not have endocrinolgist)    #HLD  - patient not on statin  - goal LDL in diabetes mellitus is <70  - outpatient fasting lipid profile     #HTN  - patient on losartan 25mg daily at home  - goal BP in diabetes mellitus is <130/80  - defer to primary team for management    Jessi Jorgensen NP-C   Contact via [employer] Teams during business hours  To reach covering provider access AMION via [employer]  For Urgent matters/after-hours/weekends/holidays please page endocrine fellow on call   For nonurgent matters please email [email or web address]    Please note that this patient may be followed by different provider tomorrow.  Notify endocrine 24 hours prior to discharge for final recommendations 57yo M w/ PMHx HTN, CAD s/p stent (2009), ICH (2008), ICM now s/p heart transplant on 12/25/2023. Endocrine consulted for T2DM management.  Tolerating POs with good oral intake. On Methylprednisolone taper as below. Patient with variable insulin ggt requirements, requiring 0-4 units, off since 1 am and was NPO after MN for Bx today. Insulin requirement on gtt was 50 units last 24 hours and received Lantus 20 units yesterday, fasting  in am .    Steroid schedule  12/28 Methylprednisolone 32 units BID   12/29 Methylprednisolone 28 units BID  12/30 Methylprednisolone 24 units BID   12/31 Methylprednisolone 20 units BID   1/1 Methylprednisolone 16 units BID   1/2 Methylprednisolone 12 units BID X 14 days     #T2DM (A1c  8.3%), uncontrolled with complications   #Steroid induced hyperglycemia   Home meds: confirmed with patient on 12/28 patient takes dapagliflozin 10mg daily, patient is NOT taking ozempic any longer (has not taken in over 4 months)    His insulin requirement on insulin gtt variable 0-5 units, total requirement last 24 hours 50 units and received Lantus 20 units around noon yesterday    Recommendations:   - Test BGs ACTID, at HS and 2 am   - Increase Lantus to 25 units ( 20 units given already + lantus 5 units SQ X1 stat ordered )  - Increase premeal Admelog 14 units before each measl ( Hold if NPO)   - Continue Moderate correction scale ACTID and sperate Moderate correction scale at HS and 2 am   - recommend Nutrition consult   - Please  keep hypoglycemia protocol in place   - Carb consistent diet   - please inform endocrine if any changes to steroid taper as this will impact insulin requirements   - BG goal 100 to 180mg/dl   - DISCHARGE RECOMMENDATIONS: depending on steroid regimen on discharge and insulin requirements basal-bolus regimen, TBD.  - OUT-PATIENT FOLLOW UP: Patient should follow up with PCP vs Endocrinology depending on insulin requirement (pt does not have endocrinolgist)    #HLD  - patient not on statin  - goal LDL in diabetes mellitus is <70  - outpatient fasting lipid profile     #HTN  - patient on losartan 25mg daily at home  - goal BP in diabetes mellitus is <130/80  - defer to primary team for management    Jessi Jorgensen NP-C   Contact via [employer] Teams during business hours  To reach covering provider access AMION via [employer]  For Urgent matters/after-hours/weekends/holidays please page endocrine fellow on call   For nonurgent matters please email [email or web address]    Please note that this patient may be followed by different provider tomorrow.  Notify endocrine 24 hours prior to discharge for final recommendations

## 2024-01-02 NOTE — PROGRESS NOTE ADULT - SUBJECTIVE AND OBJECTIVE BOX
ADVANCED HEART FAILURE & TRANSPLANT- PROGRESS NOTE  *To reach the NS1 Team from 8am to 5pm, please call [phone].  ___________________________________________________________________________    Subjective:    Medications:  acetaminophen     Tablet .. 650 milliGRAM(s) Oral every 6 hours  budesonide  80 MICROgram(s)/formoterol 4.5 MICROgram(s) Inhaler 1 Puff(s) Inhalation two times a day  buMETAnide Injectable 1 milliGRAM(s) IV Push <User Schedule>  chlorhexidine 4% Liquid 1 Application(s) Topical <User Schedule>  dextrose 5%. 1000 milliLiter(s) IV Continuous <Continuous>  dextrose 5%. 1000 milliLiter(s) IV Continuous <Continuous>  dextrose 50% Injectable 50 milliLiter(s) IV Push every 15 minutes  dextrose Oral Gel 15 Gram(s) Oral once PRN  fluconAZOLE   Tablet 100 milliGRAM(s) Oral <User Schedule>  glucagon  Injectable 1 milliGRAM(s) IntraMuscular once  hydrALAZINE 100 milliGRAM(s) Oral every 8 hours  insulin glargine Injectable (LANTUS) 20 Unit(s) SubCutaneous every morning  insulin lispro (ADMELOG) corrective regimen sliding scale   SubCutaneous at bedtime  insulin lispro (ADMELOG) corrective regimen sliding scale   SubCutaneous three times a day before meals  insulin lispro Injectable (ADMELOG) 10 Unit(s) SubCutaneous three times a day before meals  lidocaine   4% Patch 3 Patch Transdermal daily  magnesium citrate Oral Solution 296 milliLiter(s) Oral daily  methylPREDNISolone sodium succinate Injectable 12 milliGRAM(s) IV Push every 12 hours  methylPREDNISolone sodium succinate Injectable   IV Push   metoclopramide Injectable 10 milliGRAM(s) IV Push every 8 hours  mycophenolate mofetil 1000 milliGRAM(s) Oral every 12 hours  naloxegol 25 milliGRAM(s) Oral daily  NIFEdipine XL 60 milliGRAM(s) Oral every 12 hours  pantoprazole  Injectable 40 milliGRAM(s) IV Push daily  polyethylene glycol 3350 17 Gram(s) Oral daily  pregabalin 50 milliGRAM(s) Oral every 8 hours  senna 2 Tablet(s) Oral at bedtime  tacrolimus 6 milliGRAM(s) Oral <User Schedule>  trimethoprim   80 mG/sulfamethoxazole 400 mG 1 Tablet(s) Oral daily  valGANciclovir 450 milliGRAM(s) Oral every 12 hours      Vitals:  Vital Signs Last 24 Hours  T(C): 36.6 (01-02-24 @ 02:51), Max: 36.8 (01-01-24 @ 23:53)  HR: 93 (01-02-24 @ 02:51) (93 - 113)  BP: 122/76 (01-02-24 @ 02:51) (114/70 - 133/72)  RR: 18 (01-02-24 @ 02:51) (18 - 18)  SpO2: 97% (01-02-24 @ 02:51) (96% - 98%)        I&O's Summary    01 Jan 2024 07:01  -  02 Jan 2024 07:00  --------------------------------------------------------  IN: 290 mL / OUT: 3300 mL / NET: -3010 mL        Physical Exam  General: No distress. Comfortable.  Neck: Neck supple. JVP not elevated. No masses  Chest: Clear to auscultation bilaterally, + CT   CV: Normal S1 and S2.  Abdomen: Soft, non-distended, non-tender  Extremities: warm and perfused  Neurology: Alert and oriented times three.      Labs:                        11.9   9.88  )-----------( 221      ( 02 Jan 2024 07:26 )             34.5     01-02    130<L>  |  91<L>  |  37<H>  ----------------------------<  191<H>  5.2   |  26  |  0.87    Ca    9.8      02 Jan 2024 07:26  Phos  2.6     01-02  Mg     1.5     01-02    TPro  6.9  /  Alb  4.4  /  TBili  0.6  /  DBili  x   /  AST  31  /  ALT  79<H>  /  AlkPhos  54  01-02               ADVANCED HEART FAILURE & TRANSPLANT- PROGRESS NOTE  *To reach the NS1 Team from 8am to 5pm, please call [phone].  ___________________________________________________________________________    Subjective:    Medications:  acetaminophen     Tablet .. 650 milliGRAM(s) Oral every 6 hours  budesonide  80 MICROgram(s)/formoterol 4.5 MICROgram(s) Inhaler 1 Puff(s) Inhalation two times a day  buMETAnide Injectable 1 milliGRAM(s) IV Push <User Schedule>  chlorhexidine 4% Liquid 1 Application(s) Topical <User Schedule>  dextrose 5%. 1000 milliLiter(s) IV Continuous <Continuous>  dextrose 5%. 1000 milliLiter(s) IV Continuous <Continuous>  dextrose 50% Injectable 50 milliLiter(s) IV Push every 15 minutes  dextrose Oral Gel 15 Gram(s) Oral once PRN  fluconAZOLE   Tablet 100 milliGRAM(s) Oral <User Schedule>  glucagon  Injectable 1 milliGRAM(s) IntraMuscular once  hydrALAZINE 100 milliGRAM(s) Oral every 8 hours  insulin glargine Injectable (LANTUS) 20 Unit(s) SubCutaneous every morning  insulin lispro (ADMELOG) corrective regimen sliding scale   SubCutaneous at bedtime  insulin lispro (ADMELOG) corrective regimen sliding scale   SubCutaneous three times a day before meals  insulin lispro Injectable (ADMELOG) 10 Unit(s) SubCutaneous three times a day before meals  lidocaine   4% Patch 3 Patch Transdermal daily  magnesium citrate Oral Solution 296 milliLiter(s) Oral daily  methylPREDNISolone sodium succinate Injectable 12 milliGRAM(s) IV Push every 12 hours  methylPREDNISolone sodium succinate Injectable   IV Push   metoclopramide Injectable 10 milliGRAM(s) IV Push every 8 hours  mycophenolate mofetil 1000 milliGRAM(s) Oral every 12 hours  naloxegol 25 milliGRAM(s) Oral daily  NIFEdipine XL 60 milliGRAM(s) Oral every 12 hours  pantoprazole  Injectable 40 milliGRAM(s) IV Push daily  polyethylene glycol 3350 17 Gram(s) Oral daily  pregabalin 50 milliGRAM(s) Oral every 8 hours  senna 2 Tablet(s) Oral at bedtime  tacrolimus 6 milliGRAM(s) Oral <User Schedule>  trimethoprim   80 mG/sulfamethoxazole 400 mG 1 Tablet(s) Oral daily  valGANciclovir 450 milliGRAM(s) Oral every 12 hours      Vitals:  Vital Signs Last 24 Hours  T(C): 36.6 (01-02-24 @ 02:51), Max: 36.8 (01-01-24 @ 23:53)  HR: 93 (01-02-24 @ 02:51) (93 - 113)  BP: 122/76 (01-02-24 @ 02:51) (114/70 - 133/72)  RR: 18 (01-02-24 @ 02:51) (18 - 18)  SpO2: 97% (01-02-24 @ 02:51) (96% - 98%)        I&O's Summary    01 Jan 2024 07:01  -  02 Jan 2024 07:00  --------------------------------------------------------  IN: 290 mL / OUT: 3300 mL / NET: -3010 mL        Physical Exam  General: No distress. Comfortable.  Neck: Neck supple. JVP not elevated. No masses  Chest: Clear to auscultation bilaterally, + CT   CV: Normal S1 and S2.  Abdomen: Soft, non-distended, non-tender  Extremities: warm and perfused  Neurology: Alert and oriented times three.      Labs:                        11.9   9.88  )-----------( 221      ( 02 Jan 2024 07:26 )             34.5     01-02    130<L>  |  91<L>  |  37<H>  ----------------------------<  191<H>  5.2   |  26  |  0.87    Ca    9.8      02 Jan 2024 07:26  Phos  2.6     01-02  Mg     1.5     01-02    TPro  6.9  /  Alb  4.4  /  TBili  0.6  /  DBili  x   /  AST  31  /  ALT  79<H>  /  AlkPhos  54  01-02               ADVANCED HEART FAILURE & TRANSPLANT- PROGRESS NOTE  *To reach the NS1 Team from 8am to 5pm, please call [phone].  ___________________________________________________________________________    Subjective:  - underwent RHC/EMBx today     Medications:  acetaminophen     Tablet .. 650 milliGRAM(s) Oral every 6 hours  budesonide  80 MICROgram(s)/formoterol 4.5 MICROgram(s) Inhaler 1 Puff(s) Inhalation two times a day  buMETAnide Injectable 1 milliGRAM(s) IV Push <User Schedule>  chlorhexidine 4% Liquid 1 Application(s) Topical <User Schedule>  dextrose 5%. 1000 milliLiter(s) IV Continuous <Continuous>  dextrose 5%. 1000 milliLiter(s) IV Continuous <Continuous>  dextrose 50% Injectable 50 milliLiter(s) IV Push every 15 minutes  dextrose Oral Gel 15 Gram(s) Oral once PRN  fluconAZOLE   Tablet 100 milliGRAM(s) Oral <User Schedule>  glucagon  Injectable 1 milliGRAM(s) IntraMuscular once  hydrALAZINE 100 milliGRAM(s) Oral every 8 hours  insulin glargine Injectable (LANTUS) 20 Unit(s) SubCutaneous every morning  insulin lispro (ADMELOG) corrective regimen sliding scale   SubCutaneous at bedtime  insulin lispro (ADMELOG) corrective regimen sliding scale   SubCutaneous three times a day before meals  insulin lispro Injectable (ADMELOG) 10 Unit(s) SubCutaneous three times a day before meals  lidocaine   4% Patch 3 Patch Transdermal daily  magnesium citrate Oral Solution 296 milliLiter(s) Oral daily  methylPREDNISolone sodium succinate Injectable 12 milliGRAM(s) IV Push every 12 hours  methylPREDNISolone sodium succinate Injectable   IV Push   metoclopramide Injectable 10 milliGRAM(s) IV Push every 8 hours  mycophenolate mofetil 1000 milliGRAM(s) Oral every 12 hours  naloxegol 25 milliGRAM(s) Oral daily  NIFEdipine XL 60 milliGRAM(s) Oral every 12 hours  pantoprazole  Injectable 40 milliGRAM(s) IV Push daily  polyethylene glycol 3350 17 Gram(s) Oral daily  pregabalin 50 milliGRAM(s) Oral every 8 hours  senna 2 Tablet(s) Oral at bedtime  tacrolimus 6 milliGRAM(s) Oral <User Schedule>  trimethoprim   80 mG/sulfamethoxazole 400 mG 1 Tablet(s) Oral daily  valGANciclovir 450 milliGRAM(s) Oral every 12 hours      Vitals:  Vital Signs Last 24 Hours  T(C): 36.6 (01-02-24 @ 02:51), Max: 36.8 (01-01-24 @ 23:53)  HR: 93 (01-02-24 @ 02:51) (93 - 113)  BP: 122/76 (01-02-24 @ 02:51) (114/70 - 133/72)  RR: 18 (01-02-24 @ 02:51) (18 - 18)  SpO2: 97% (01-02-24 @ 02:51) (96% - 98%)        I&O's Summary    01 Jan 2024 07:01  -  02 Jan 2024 07:00  --------------------------------------------------------  IN: 290 mL / OUT: 3300 mL / NET: -3010 mL        Physical Exam  General: No distress. Comfortable.  Neck: Neck supple. JVP not elevated. No masses  Chest: Clear to auscultation bilaterally, + CT   CV: Normal S1 and S2.  Abdomen: Soft, non-distended, non-tender  Extremities: warm and perfused  Neurology: Alert and oriented times three.      Labs:                        11.9   9.88  )-----------( 221      ( 02 Jan 2024 07:26 )             34.5     01-02    130<L>  |  91<L>  |  37<H>  ----------------------------<  191<H>  5.2   |  26  |  0.87    Ca    9.8      02 Jan 2024 07:26  Phos  2.6     01-02  Mg     1.5     01-02    TPro  6.9  /  Alb  4.4  /  TBili  0.6  /  DBili  x   /  AST  31  /  ALT  79<H>  /  AlkPhos  54  01-02               ADVANCED HEART FAILURE & TRANSPLANT- PROGRESS NOTE  *To reach the NS1 Team from 8am to 5pm, please call [phone].  ___________________________________________________________________________    Subjective:  - underwent RHC/EMBx today     Medications:  acetaminophen     Tablet .. 650 milliGRAM(s) Oral every 6 hours  budesonide  80 MICROgram(s)/formoterol 4.5 MICROgram(s) Inhaler 1 Puff(s) Inhalation two times a day  buMETAnide Injectable 1 milliGRAM(s) IV Push <User Schedule>  chlorhexidine 4% Liquid 1 Application(s) Topical <User Schedule>  dextrose 5%. 1000 milliLiter(s) IV Continuous <Continuous>  dextrose 5%. 1000 milliLiter(s) IV Continuous <Continuous>  dextrose 50% Injectable 50 milliLiter(s) IV Push every 15 minutes  dextrose Oral Gel 15 Gram(s) Oral once PRN  fluconAZOLE   Tablet 100 milliGRAM(s) Oral <User Schedule>  glucagon  Injectable 1 milliGRAM(s) IntraMuscular once  hydrALAZINE 100 milliGRAM(s) Oral every 8 hours  insulin glargine Injectable (LANTUS) 20 Unit(s) SubCutaneous every morning  insulin lispro (ADMELOG) corrective regimen sliding scale   SubCutaneous at bedtime  insulin lispro (ADMELOG) corrective regimen sliding scale   SubCutaneous three times a day before meals  insulin lispro Injectable (ADMELOG) 10 Unit(s) SubCutaneous three times a day before meals  lidocaine   4% Patch 3 Patch Transdermal daily  magnesium citrate Oral Solution 296 milliLiter(s) Oral daily  methylPREDNISolone sodium succinate Injectable 12 milliGRAM(s) IV Push every 12 hours  methylPREDNISolone sodium succinate Injectable   IV Push   metoclopramide Injectable 10 milliGRAM(s) IV Push every 8 hours  mycophenolate mofetil 1000 milliGRAM(s) Oral every 12 hours  naloxegol 25 milliGRAM(s) Oral daily  NIFEdipine XL 60 milliGRAM(s) Oral every 12 hours  pantoprazole  Injectable 40 milliGRAM(s) IV Push daily  polyethylene glycol 3350 17 Gram(s) Oral daily  pregabalin 50 milliGRAM(s) Oral every 8 hours  senna 2 Tablet(s) Oral at bedtime  tacrolimus 6 milliGRAM(s) Oral <User Schedule>  trimethoprim   80 mG/sulfamethoxazole 400 mG 1 Tablet(s) Oral daily  valGANciclovir 450 milliGRAM(s) Oral every 12 hours      Vitals:  Vital Signs Last 24 Hours  T(C): 36.6 (01-02-24 @ 02:51), Max: 36.8 (01-01-24 @ 23:53)  HR: 93 (01-02-24 @ 02:51) (93 - 113)  BP: 122/76 (01-02-24 @ 02:51) (114/70 - 133/72)  RR: 18 (01-02-24 @ 02:51) (18 - 18)  SpO2: 97% (01-02-24 @ 02:51) (96% - 98%)        I&O's Summary    01 Jan 2024 07:01  -  02 Jan 2024 07:00  --------------------------------------------------------  IN: 290 mL / OUT: 3300 mL / NET: -3010 mL        Physical Exam  General: No distress. Comfortable.  Neck: Neck supple. JVP not elevated. No masses  Chest: Clear to auscultation bilaterally, + CT   CV: Normal S1 and S2.  Abdomen: Soft, non-distended, non-tender  Extremities: warm and perfused  Neurology: Alert and oriented times three.      Labs:                        11.9   9.88  )-----------( 221      ( 02 Jan 2024 07:26 )             34.5     01-02    130<L>  |  91<L>  |  37<H>  ----------------------------<  191<H>  5.2   |  26  |  0.87    Ca    9.8      02 Jan 2024 07:26  Phos  2.6     01-02  Mg     1.5     01-02    TPro  6.9  /  Alb  4.4  /  TBili  0.6  /  DBili  x   /  AST  31  /  ALT  79<H>  /  AlkPhos  54  01-02

## 2024-01-02 NOTE — PROGRESS NOTE ADULT - ASSESSMENT
58M : HFrEF (LVIDd 6.4 cm, LVEF 32%), PCI ('08), HTN, DMT2 (A1c 8.3%) and CVA s/p TPA ('18), recently treated for PNA who initially presented to [facility] via EMS after syncope reportedly requiring defibrillation. Treated for ACS but left AMA to come to [facility]. On arrival ECG with ST depression in lateral leads. Intubated 11/1 for respiratory failure and was found to have COVID. L/RHC 11/1revealing  of LAD and RCA, elevated filling pressures and CI of 1.5 prompting placement of IABP. Extubated 11/3. IABP was weaned to off 11/7, however the following day developed PMVT cardiac arrest with prompt CPR and defibrillation, started on IV Lidocaine/Amio and IABP ultimately replaced on 11/9. During this admission was found to be bacteremic for which she was treated for Staph epi, Enterococcus faecalis, Cutibacterium with IV abx.  Was listed Status 2, ABO A, PRA 0% (12/12).     A suitable donor was identified and on 12/25, he underwent OHT (ischemic Time: 253min, CMV -/+, Toxo -/-). The following day, extubated and IABP removed. Milrinone was being gradually weaned but when turned off yesterday, despite adequate perfusion indicies, had rise in lactate for which inotropic support was resumed. Currently appears well supported and compensated. Will again trial wean off inotrope. Will also adjust oral antihypertensive regimen with goal to wean off Cardene gtt. Plan for 1st EMBX next week on 1/2.     On 10/25/23, pt underwent OHT  - IABP removed 12/26  - Sabrina and Epi weaned off 12/26  - Milrinone off  - Procardia XL 60 mg BID  - Bumex TID  - 1st RHC/EMBx on Tuesday 1/2  - Transfer to Stepdown 12/31  -  Right femerol rangel in place   - Med CT x 3 - LWS      insulin gtt d/t hyperglycemia - house endo following   - d/c planning - aniticipate home   1/1/24 - VSS - pt. c/o uncomfortable bed last evening - stating he would sign out AMA if he was not given a more comfortable bed.  staff obliged & a new bed was brought in.  pt requesting new urinal with each void as he "is at risk for an infection"  Ptt properly educated on post transplant risks.  states, "A doctor told me I can only use a urinal once to prevent infection"  transplant team to reinforce post-transplant precautions. MED CT x 3 in place  ?d/c cody multani today - will rounds with transplant team  1/2/24 VSS - NPO for cardiac bx -rhc this am-  pt refused blood draw this am despite provider discussing the importance of blood levels post transplant.  Pt acquiesced - allowed blood draw @ 7:30a-d/c planning -  58M : HFrEF (LVIDd 6.4 cm, LVEF 32%), PCI ('08), HTN, DMT2 (A1c 8.3%) and CVA s/p TPA ('18), recently treated for PNA who initially presented to [facility] via EMS after syncope reportedly requiring defibrillation. Treated for ACS but left AMA to come to [facility]. On arrival ECG with ST depression in lateral leads. Intubated 11/1 for respiratory failure and was found to have COVID. L/RHC 11/1revealing  of LAD and RCA, elevated filling pressures and CI of 1.5 prompting placement of IABP. Extubated 11/3. IABP was weaned to off 11/7, however the following day developed PMVT cardiac arrest with prompt CPR and defibrillation, started on IV Lidocaine/Amio and IABP ultimately replaced on 11/9. During this admission was found to be bacteremic for which she was treated for Staph epi, Enterococcus faecalis, Cutibacterium with IV abx.  Was listed Status 2, ABO A, PRA 0% (12/12).     A suitable donor was identified and on 12/25, he underwent OHT (ischemic Time: 253min, CMV -/+, Toxo -/-). The following day, extubated and IABP removed. Milrinone was being gradually weaned but when turned off yesterday, despite adequate perfusion indicies, had rise in lactate for which inotropic support was resumed. Currently appears well supported and compensated. Will again trial wean off inotrope. Will also adjust oral antihypertensive regimen with goal to wean off Cardene gtt. Plan for 1st EMBX next week on 1/2.     On 10/25/23, pt underwent OHT  - IABP removed 12/26  - Sabrina and Epi weaned off 12/26  - Milrinone off  - Procardia XL 60 mg BID  - Bumex TID  - 1st RHC/EMBx on Tuesday 1/2  - Transfer to Stepdown 12/31  -  Right femerol rangel in place   - Med CT x 3 - LWS      insulin gtt d/t hyperglycemia - house endo following   - d/c planning - aniticipate home   1/1/24 - VSS - pt. c/o uncomfortable bed last evening - stating he would sign out AMA if he was not given a more comfortable bed.  staff obliged & a new bed was brought in.  pt requesting new urinal with each void as he "is at risk for an infection"  Ptt properly educated on post transplant risks.  states, "A doctor told me I can only use a urinal once to prevent infection"  transplant team to reinforce post-transplant precautions. MED CT x 3 in place  ?d/c cody multani today - will rounds with transplant team  1/2/24 VSS - NPO for cardiac bx -rhc this am-  pt refused blood draw this am despite provider discussing the importance of blood levels post transplant.  Pt acquiesced - allowed blood draw @ 7:30a-d/c planning -

## 2024-01-02 NOTE — PROGRESS NOTE ADULT - SUBJECTIVE AND OBJECTIVE BOX
INFECTIOUS DISEASES FOLLOW UP-- Courtney Peters  [phone]    This is a follow up note for this  58yMale with  Non-ST elevation myocardial infarction (NSTEMI)        ROS:  CONSTITUTIONAL:  No fever, good appetite  CARDIOVASCULAR:  No chest pain or palpitations  RESPIRATORY:  No dyspnea  GASTROINTESTINAL:  No nausea, vomiting, diarrhea, or abdominal pain  GENITOURINARY:  No dysuria  NEUROLOGIC:  No headache,     Allergies    penicillins (Unknown)    Intolerances        ANTIBIOTICS/RELEVANT:  antimicrobials  fluconAZOLE   Tablet 100 milliGRAM(s) Oral <User Schedule>  trimethoprim   80 mG/sulfamethoxazole 400 mG 1 Tablet(s) Oral daily  valGANciclovir 450 milliGRAM(s) Oral every 12 hours    immunologic:  mycophenolate mofetil 1000 milliGRAM(s) Oral every 12 hours  tacrolimus 6 milliGRAM(s) Oral <User Schedule>    OTHER:  acetaminophen     Tablet .. 650 milliGRAM(s) Oral every 6 hours  budesonide  80 MICROgram(s)/formoterol 4.5 MICROgram(s) Inhaler 1 Puff(s) Inhalation two times a day  buMETAnide Injectable 1 milliGRAM(s) IV Push <User Schedule>  chlorhexidine 4% Liquid 1 Application(s) Topical <User Schedule>  dextrose 5%. 1000 milliLiter(s) IV Continuous <Continuous>  dextrose 5%. 1000 milliLiter(s) IV Continuous <Continuous>  dextrose 50% Injectable 50 milliLiter(s) IV Push every 15 minutes  dextrose Oral Gel 15 Gram(s) Oral once PRN  glucagon  Injectable 1 milliGRAM(s) IntraMuscular once  hydrALAZINE 100 milliGRAM(s) Oral every 8 hours  insulin lispro (ADMELOG) corrective regimen sliding scale   SubCutaneous at bedtime  insulin lispro (ADMELOG) corrective regimen sliding scale   SubCutaneous three times a day before meals  insulin lispro Injectable (ADMELOG) 10 Unit(s) SubCutaneous three times a day before meals  lidocaine   4% Patch 3 Patch Transdermal daily  magnesium oxide 400 milliGRAM(s) Oral three times a day with meals  methylPREDNISolone sodium succinate Injectable 12 milliGRAM(s) IV Push every 12 hours  methylPREDNISolone sodium succinate Injectable   IV Push   metoclopramide Injectable 10 milliGRAM(s) IV Push every 8 hours  naloxegol 25 milliGRAM(s) Oral daily  NIFEdipine XL 60 milliGRAM(s) Oral every 12 hours  pantoprazole  Injectable 40 milliGRAM(s) IV Push daily  polyethylene glycol 3350 17 Gram(s) Oral daily  pregabalin 50 milliGRAM(s) Oral every 8 hours  senna 2 Tablet(s) Oral at bedtime      Objective:  Vital Signs Last 24 Hrs  T(C): 36.7 (2024 15:14), Max: 37.1 (2024 07:06)  T(F): 98 (2024 15:14), Max: 98.7 (2024 07:06)  HR: 99 (2024 15:14) (93 - 101)  BP: 123/68 (2024 15:14) (105/71 - 127/70)  BP(mean): 87 (2024 15:14) (83 - 92)  RR: 18 (2024 15:14) (16 - 18)  SpO2: 96% (2024 15:14) (94% - 97%)    Parameters below as of 2024 15:14  Patient On (Oxygen Delivery Method): room air        PHYSICAL EXAM:  Constitutional:no acute distress  Eyes:MARVEL, EOMI  Ear/Nose/Throat: no oral lesions, 	  Respiratory: clear BL  Cardiovascular: S1S2  Gastrointestinal:soft, (+) BS, no tenderness  Extremities:no e/e/c  No Lymphadenopathy  IV sites not inflammed.    LABS:                        11.9   9.88  )-----------( 221      ( 2024 07:26 )             34.5     01-02    130<L>  |  91<L>  |  37<H>  ----------------------------<  191<H>  5.2   |  26  |  0.87    Ca    9.8      2024 07:26  Phos  2.6     01-02  Mg     1.5     01-02    TPro  6.9  /  Alb  4.4  /  TBili  0.6  /  DBili  x   /  AST  31  /  ALT  79<H>  /  AlkPhos  54  01-02      Urinalysis Basic - ( 2024 15:19 )    Color: Yellow / Appearance: Clear / S.015 / pH: x  Gluc: x / Ketone: Negative mg/dL  / Bili: Negative / Urobili: 0.2 mg/dL   Blood: x / Protein: Negative mg/dL / Nitrite: Negative   Leuk Esterase: Negative / RBC: x / WBC x   Sq Epi: x / Non Sq Epi: x / Bacteria: x        MICROBIOLOGY:      Culture - Body Fluid with Gram Stain (23 @ 15:28)    Gram Stain:   No polymorphonuclear cells seen  No organisms seen  by cytocentrifuge   Specimen Source: .Body Fluid procurement fluid   Culture Results:   No growth    Respiratory Viral Panel with COVID-19 by TIM (23 @ 15:09)    Rapid RVP Result: NotDete   SARS-CoV-2: NotDete: This Respiratory Panel uses polymerase chain reaction (PCR) to detect for  adenovirus; coronavirus (HKU1, NL63, 229E, OC43); human metapneumovirus  (hMPV); human enterovirus/rhinovirus (Entero/RV); influenza A; influenza  A/H1; influenza A/H3; influenza A/H1-2009; influenza B; parainfluenza  viruses 1, 2, 3, 4; respiratory syncytial virus; Mycoplasma pneumoniae;  Chlamydophila pneumoniae; and SARS-CoV-2.          RECENT CULTURES:      RADIOLOGY & ADDITIONAL STUDIES:    < from: Xray Chest 1 View- PORTABLE-Routine (Xray Chest 1 View- PORTABLE-Routine in AM.) (24 @ 07:46) >  Findings/  Impression: Status post open heart surgery including mediastinal drains.   Cardiomegaly. Lungs are clear.    < end of copied text >   INFECTIOUS DISEASES FOLLOW UP-- Courtney Peters  [phone]    This is a follow up note for this  58yMale with  Non-ST elevation myocardial infarction (NSTEMI)        ROS:  CONSTITUTIONAL:  No fever, good appetite  CARDIOVASCULAR:  No chest pain or palpitations  RESPIRATORY:  No dyspnea  GASTROINTESTINAL:  No nausea, vomiting, diarrhea, or abdominal pain  GENITOURINARY:  No dysuria  NEUROLOGIC:  No headache,     Allergies    penicillins (Unknown)    Intolerances        ANTIBIOTICS/RELEVANT:  antimicrobials  fluconAZOLE   Tablet 100 milliGRAM(s) Oral <User Schedule>  trimethoprim   80 mG/sulfamethoxazole 400 mG 1 Tablet(s) Oral daily  valGANciclovir 450 milliGRAM(s) Oral every 12 hours    immunologic:  mycophenolate mofetil 1000 milliGRAM(s) Oral every 12 hours  tacrolimus 6 milliGRAM(s) Oral <User Schedule>    OTHER:  acetaminophen     Tablet .. 650 milliGRAM(s) Oral every 6 hours  budesonide  80 MICROgram(s)/formoterol 4.5 MICROgram(s) Inhaler 1 Puff(s) Inhalation two times a day  buMETAnide Injectable 1 milliGRAM(s) IV Push <User Schedule>  chlorhexidine 4% Liquid 1 Application(s) Topical <User Schedule>  dextrose 5%. 1000 milliLiter(s) IV Continuous <Continuous>  dextrose 5%. 1000 milliLiter(s) IV Continuous <Continuous>  dextrose 50% Injectable 50 milliLiter(s) IV Push every 15 minutes  dextrose Oral Gel 15 Gram(s) Oral once PRN  glucagon  Injectable 1 milliGRAM(s) IntraMuscular once  hydrALAZINE 100 milliGRAM(s) Oral every 8 hours  insulin lispro (ADMELOG) corrective regimen sliding scale   SubCutaneous at bedtime  insulin lispro (ADMELOG) corrective regimen sliding scale   SubCutaneous three times a day before meals  insulin lispro Injectable (ADMELOG) 10 Unit(s) SubCutaneous three times a day before meals  lidocaine   4% Patch 3 Patch Transdermal daily  magnesium oxide 400 milliGRAM(s) Oral three times a day with meals  methylPREDNISolone sodium succinate Injectable 12 milliGRAM(s) IV Push every 12 hours  methylPREDNISolone sodium succinate Injectable   IV Push   metoclopramide Injectable 10 milliGRAM(s) IV Push every 8 hours  naloxegol 25 milliGRAM(s) Oral daily  NIFEdipine XL 60 milliGRAM(s) Oral every 12 hours  pantoprazole  Injectable 40 milliGRAM(s) IV Push daily  polyethylene glycol 3350 17 Gram(s) Oral daily  pregabalin 50 milliGRAM(s) Oral every 8 hours  senna 2 Tablet(s) Oral at bedtime      Objective:  Vital Signs Last 24 Hrs  T(C): 36.7 (2024 15:14), Max: 37.1 (2024 07:06)  T(F): 98 (2024 15:14), Max: 98.7 (2024 07:06)  HR: 99 (2024 15:14) (93 - 101)  BP: 123/68 (2024 15:14) (105/71 - 127/70)  BP(mean): 87 (2024 15:14) (83 - 92)  RR: 18 (2024 15:14) (16 - 18)  SpO2: 96% (2024 15:14) (94% - 97%)    Parameters below as of 2024 15:14  Patient On (Oxygen Delivery Method): room air        PHYSICAL EXAM:  Constitutional:no acute distress  Eyes:MARVEL, EOMI  Ear/Nose/Throat: no oral lesions, 	  Respiratory: clear BL  Cardiovascular: S1S2  Gastrointestinal:soft, (+) BS, no tenderness  Extremities:no e/e/c  No Lymphadenopathy  IV sites not inflammed.    LABS:                        11.9   9.88  )-----------( 221      ( 2024 07:26 )             34.5     01-02    130<L>  |  91<L>  |  37<H>  ----------------------------<  191<H>  5.2   |  26  |  0.87    Ca    9.8      2024 07:26  Phos  2.6     01-02  Mg     1.5     01-02    TPro  6.9  /  Alb  4.4  /  TBili  0.6  /  DBili  x   /  AST  31  /  ALT  79<H>  /  AlkPhos  54  01-02      Urinalysis Basic - ( 2024 15:19 )    Color: Yellow / Appearance: Clear / S.015 / pH: x  Gluc: x / Ketone: Negative mg/dL  / Bili: Negative / Urobili: 0.2 mg/dL   Blood: x / Protein: Negative mg/dL / Nitrite: Negative   Leuk Esterase: Negative / RBC: x / WBC x   Sq Epi: x / Non Sq Epi: x / Bacteria: x        MICROBIOLOGY:      Culture - Body Fluid with Gram Stain (23 @ 15:28)    Gram Stain:   No polymorphonuclear cells seen  No organisms seen  by cytocentrifuge   Specimen Source: .Body Fluid procurement fluid   Culture Results:   No growth    Respiratory Viral Panel with COVID-19 by TIM (23 @ 15:09)    Rapid RVP Result: NotDete   SARS-CoV-2: NotDete: This Respiratory Panel uses polymerase chain reaction (PCR) to detect for  adenovirus; coronavirus (HKU1, NL63, 229E, OC43); human metapneumovirus  (hMPV); human enterovirus/rhinovirus (Entero/RV); influenza A; influenza  A/H1; influenza A/H3; influenza A/H1-2009; influenza B; parainfluenza  viruses 1, 2, 3, 4; respiratory syncytial virus; Mycoplasma pneumoniae;  Chlamydophila pneumoniae; and SARS-CoV-2.          RECENT CULTURES:      RADIOLOGY & ADDITIONAL STUDIES:    < from: Xray Chest 1 View- PORTABLE-Routine (Xray Chest 1 View- PORTABLE-Routine in AM.) (24 @ 07:46) >  Findings/  Impression: Status post open heart surgery including mediastinal drains.   Cardiomegaly. Lungs are clear.    < end of copied text >

## 2024-01-02 NOTE — PROGRESS NOTE ADULT - ASSESSMENT
59 yo M with HFrEF (LVIDd 6.4 cm, LVEF 32%), CAD s/p PCI (2008), HTN, DMT2 (A1c 8.3%) and CVA s/p TPA (2018), recently treated for PNA who initially presented to [facility] via EMS after syncope reportedly requiring defibrillation. Treated for ACS but left AMA to come to [facility]. On arrival ECG with ST depression in lateral leads. Intubated 11/1 for respiratory failure and was found to have COVID. L/RHC 11/1revealing  of LAD and RCA, elevated filling pressures and CI of 1.5 prompting placement of IABP. Extubated 11/3. IABP was weaned to off 11/7, however the following day developed PMVT cardiac arrest with prompt CPR and defibrillation, started on IV Lidocaine/Amio and IABP ultimately replaced on 11/9. During this admission was found to be bacteremic for which she was treated for Staph epi, Enterococcus faecalis, Cutibacterium with IV abx.  Was listed Status 2, ABO A, PRA 0% (12/12).     A suitable donor was identified and on 12/25, he underwent OHT (ischemic Time: 253min, CMV -/+, Toxo -/-). The following day, extubated and IABP removed. Overall progressing well. Issues with constipation post-op, on needs aggressive bowel regimen. Transferred to stepdown 12/31. Plan for 1st EMBX next week on 1/2.  59 yo M with HFrEF (LVIDd 6.4 cm, LVEF 32%), CAD s/p PCI (2008), HTN, DMT2 (A1c 8.3%) and CVA s/p TPA (2018), recently treated for PNA who initially presented to [facility] via EMS after syncope reportedly requiring defibrillation. Treated for ACS but left AMA to come to [facility]. On arrival ECG with ST depression in lateral leads. Intubated 11/1 for respiratory failure and was found to have COVID. L/RHC 11/1revealing  of LAD and RCA, elevated filling pressures and CI of 1.5 prompting placement of IABP. Extubated 11/3. IABP was weaned to off 11/7, however the following day developed PMVT cardiac arrest with prompt CPR and defibrillation, started on IV Lidocaine/Amio and IABP ultimately replaced on 11/9. During this admission was found to be bacteremic for which she was treated for Staph epi, Enterococcus faecalis, Cutibacterium with IV abx.  Was listed Status 2, ABO A, PRA 0% (12/12).     A suitable donor was identified and on 12/25, he underwent OHT (ischemic Time: 253min, CMV -/+, Toxo -/-). The following day, extubated and IABP removed. Overall progressing well. Issues with constipation post-op, on needs aggressive bowel regimen. Transferred to stepdown 12/31. Plan for 1st EMBX next week on 1/2.  57 yo M with HFrEF (LVIDd 6.4 cm, LVEF 32%), CAD s/p PCI (2008), HTN, DMT2 (A1c 8.3%) and CVA s/p TPA (2018), recently treated for PNA who initially presented to [facility] via EMS after syncope reportedly requiring defibrillation. Treated for ACS but left AMA to come to [facility]. On arrival ECG with ST depression in lateral leads. Intubated 11/1 for respiratory failure and was found to have COVID. L/RHC 11/1revealing  of LAD and RCA, elevated filling pressures and CI of 1.5 prompting placement of IABP. Extubated 11/3. IABP was weaned to off 11/7, however the following day developed PMVT cardiac arrest with prompt CPR and defibrillation, started on IV Lidocaine/Amio and IABP ultimately replaced on 11/9. During this admission was found to be bacteremic for which she was treated for Staph epi, Enterococcus faecalis, Cutibacterium with IV abx.  Was listed Status 2, ABO A, PRA 0% (12/12).     A suitable donor was identified and on 12/25, he underwent OHT (ischemic Time: 253min, CMV -/+, Toxo -/-). The following day, extubated and IABP removed. Overall progressing well. He underwent RHC/EMBx with revealed elevated filling pressures and normal cardiac output. Will increase diuretics and hopeful for discharge end of week vs early next week.   57 yo M with HFrEF (LVIDd 6.4 cm, LVEF 32%), CAD s/p PCI (2008), HTN, DMT2 (A1c 8.3%) and CVA s/p TPA (2018), recently treated for PNA who initially presented to [facility] via EMS after syncope reportedly requiring defibrillation. Treated for ACS but left AMA to come to [facility]. On arrival ECG with ST depression in lateral leads. Intubated 11/1 for respiratory failure and was found to have COVID. L/RHC 11/1revealing  of LAD and RCA, elevated filling pressures and CI of 1.5 prompting placement of IABP. Extubated 11/3. IABP was weaned to off 11/7, however the following day developed PMVT cardiac arrest with prompt CPR and defibrillation, started on IV Lidocaine/Amio and IABP ultimately replaced on 11/9. During this admission was found to be bacteremic for which she was treated for Staph epi, Enterococcus faecalis, Cutibacterium with IV abx.  Was listed Status 2, ABO A, PRA 0% (12/12).     A suitable donor was identified and on 12/25, he underwent OHT (ischemic Time: 253min, CMV -/+, Toxo -/-). The following day, extubated and IABP removed. Overall progressing well. He underwent RHC/EMBx with revealed elevated filling pressures and normal cardiac output. Will increase diuretics and hopeful for discharge end of week vs early next week.

## 2024-01-02 NOTE — PROGRESS NOTE ADULT - NS ATTEND AMEND GEN_ALL_CORE FT
59 y/o M admitted with cardiogenic shock, now s/p OHT on 12/25/2023 (CMV -/+, Toxo -/-). RHC/EMB today with elevated filling pressures. Continue Bumex 1mg TID and add hypertonic saline today. Continue Hydralazine and Nifedipine for BP control.  Continue immunosuppression with Tacrolimus, CellCept 100mg BID, Prednisone taper. Continue prophylaxis with Bactrim, Valcyte, Fluconazole. 57 y/o M admitted with cardiogenic shock, now s/p OHT on 12/25/2023 (CMV -/+, Toxo -/-). RHC/EMB today with elevated filling pressures. Continue Bumex 1mg TID and add hypertonic saline today. Continue Hydralazine and Nifedipine for BP control.  Continue immunosuppression with Tacrolimus, CellCept 100mg BID, Prednisone taper. Continue prophylaxis with Bactrim, Valcyte, Fluconazole.

## 2024-01-02 NOTE — PROGRESS NOTE ADULT - PROBLEM SELECTOR PLAN 3
Spoke with mom, informed her of Dr. Bates's response. Mom states she will finish the bactrim & keep the 2/18/19 appointment.  Mom states [facility], Dr. Mendiola left a message for child to finish the antibiotic course.  Mom states 24 hours after starting antibiotic, child felt better, no stomach pain & no more vomiting.  Margot Alvarado RN     - CMV -/+: intermediate risk.  On Valcyte   - Toxo -/-: none required  - PJP ppx: on bactrim  - Trush ppx: continue Nystatin S/S q6 hours   - donor HCV TIM - but HCV antibody +. - CMV -/+: intermediate risk.  Continue Valcyte 450 mg PO BID   - Toxo -/-: none required  - PJP ppx: continue Bactrim S/S PO QD  - Trush ppx: fluconazole as stated above   - donor HCV TIM - but HCV antibody +.

## 2024-01-02 NOTE — PROGRESS NOTE ADULT - SUBJECTIVE AND OBJECTIVE BOX
LD VALENCIA  58y Male  MRN:[id number]    Patient is a 58y old  Male who presents with a chief complaint of NSTEMI (01 Nov 2023 20:29)    HPI:  57yo M w/ hx HTN, CAD w/ 1 stent in 2009, ICH (2008) presenting with abn ekg. Patient presented to [facility] where he was found to have STEMI, recommended to get cath however patient did not want to get it there so it left and came here.  Patient initially had cough, congestion, fever, was placed on antibiotics on Sunday.  Started feeling nauseous and had a presyncopal event after which he presented to ED last night.  Had chest pain as well.  Chest pain is midsternal.  Not currently having chest pain.  Received 4 aspirin 30 min pta. (01 Nov 2023 15:11)      Patient seen and evaluated at bedside. interval events noted    Interval HPI:   no acute events o/n     PAST MEDICAL & SURGICAL HISTORY:  HTN (hypertension)      CAD (coronary artery disease)  2009; stent      Intracranial hemorrhage  2008      Respiratory arrest  december 1st      Myocardial infarction, unspecified MI type, unspecified artery      History of coronary artery stent placement          REVIEW OF SYSTEMS:  as per hpi     VITALS:      Vital Signs Last 24 Hrs  T(C): 36.7 (02 Jan 2024 15:14), Max: 37.1 (02 Jan 2024 07:06)  T(F): 98 (02 Jan 2024 15:14), Max: 98.7 (02 Jan 2024 07:06)  HR: 99 (02 Jan 2024 15:14) (93 - 101)  BP: 123/68 (02 Jan 2024 15:14) (105/71 - 127/70)  BP(mean): 87 (02 Jan 2024 15:14) (83 - 92)  RR: 18 (02 Jan 2024 15:14) (16 - 18)  SpO2: 96% (02 Jan 2024 15:14) (94% - 97%)    Parameters below as of 02 Jan 2024 15:14  Patient On (Oxygen Delivery Method): room air          PHYSICAL EXAM:  GENERAL: NAD, comfortable.    HEAD:  Atraumatic, Normocephalic  EYES: EOMI, PERRLA, conjunctiva and sclera clear  NECK:  No JVD.    CHEST/LUNG: Clear to auscultation bilaterally; No wheeze +chest tubes  HEART: Regular rate and rhythm; No murmurs, rubs, or gallops  ABDOMEN: Soft, Nontender, Nondistended; Bowel sounds present  EXTREMITIES:  2+ Peripheral Pulses, No clubbing, cyanosis, or edema  NEUROLOGY: a0x3          Consultant(s) Notes Reviewed:  [x ] YES  [ ] NO  Care Discussed with Consultants/Other Providers [ x] YES  [ ] NO    MEDS:   MEDICATIONS  (STANDING):  acetaminophen     Tablet .. 650 milliGRAM(s) Oral every 6 hours  budesonide  80 MICROgram(s)/formoterol 4.5 MICROgram(s) Inhaler 1 Puff(s) Inhalation two times a day  buMETAnide Injectable 1 milliGRAM(s) IV Push <User Schedule>  chlorhexidine 4% Liquid 1 Application(s) Topical <User Schedule>  dextrose 5%. 1000 milliLiter(s) (50 mL/Hr) IV Continuous <Continuous>  dextrose 5%. 1000 milliLiter(s) (100 mL/Hr) IV Continuous <Continuous>  dextrose 50% Injectable 50 milliLiter(s) IV Push every 15 minutes  fluconAZOLE   Tablet 100 milliGRAM(s) Oral <User Schedule>  glucagon  Injectable 1 milliGRAM(s) IntraMuscular once  hydrALAZINE 100 milliGRAM(s) Oral every 8 hours  insulin lispro (ADMELOG) corrective regimen sliding scale   SubCutaneous three times a day before meals  insulin lispro (ADMELOG) corrective regimen sliding scale   SubCutaneous at bedtime  insulin lispro Injectable (ADMELOG) 10 Unit(s) SubCutaneous three times a day before meals  lidocaine   4% Patch 3 Patch Transdermal daily  magnesium oxide 400 milliGRAM(s) Oral three times a day with meals  methylPREDNISolone sodium succinate Injectable 12 milliGRAM(s) IV Push every 12 hours  methylPREDNISolone sodium succinate Injectable   IV Push   metoclopramide Injectable 10 milliGRAM(s) IV Push every 8 hours  mycophenolate mofetil 1000 milliGRAM(s) Oral every 12 hours  naloxegol 25 milliGRAM(s) Oral daily  NIFEdipine XL 60 milliGRAM(s) Oral every 12 hours  pantoprazole  Injectable 40 milliGRAM(s) IV Push daily  polyethylene glycol 3350 17 Gram(s) Oral daily  pregabalin 50 milliGRAM(s) Oral every 8 hours  senna 2 Tablet(s) Oral at bedtime  tacrolimus 6 milliGRAM(s) Oral <User Schedule>  trimethoprim   80 mG/sulfamethoxazole 400 mG 1 Tablet(s) Oral daily  valGANciclovir 450 milliGRAM(s) Oral every 12 hours    MEDICATIONS  (PRN):  dextrose Oral Gel 15 Gram(s) Oral once PRN Blood Glucose LESS THAN 70 milliGRAM(s)/deciliter        Allergies    penicillins (Unknown)    Intolerances        LABS:                                                              11.9   9.88  )-----------( 221      ( 02 Jan 2024 07:26 )             34.5   01-02    130<L>  |  91<L>  |  37<H>  ----------------------------<  191<H>  5.2   |  26  |  0.87    Ca    9.8      02 Jan 2024 07:26  Phos  2.6     01-02  Mg     1.5     01-02    TPro  6.9  /  Alb  4.4  /  TBili  0.6  /  DBili  x   /  AST  31  /  ALT  79<H>  /  AlkPhos  54  01-02      < from: CT Abdomen and Pelvis No Cont (11.28.23 @ 03:38) >  IMPRESSION:  Mildly dilated colon and prominent but not overly dilated small bowel   with air-fluid levels. No discrete transition point. Findings are   suggestive of ileus.    Intra-aortic balloon pump with the inferior marker at the level of the   inferior mesenteric artery and the balloon overlying the origins of the   renal arteries, celiac artery, and superior mesenteric artery. Consider   repositioning. Concern for IABP positioning was discussed with LANETTE Hawthorne   on 11/28/2023 at 3:42 AM by Dr. Shepard.    < end of copied text >          < from: Colonoscopy (11.17.23 @ 10:35) >                                                                                                        Impression:          - The entire examined colon is normal on direct and retroflexion views.                       - No specimens collected.  Recommendation:      - Resume previous diet today.                       - No large polyps or masses detected, No objection from GI standpoint to                 proceed with heart transplantation/advanced heart therapies.                       - Please call back should any further questions or concerns arise.    < end of copied text >     < from: Xray Chest 1 View- PORTABLE-Urgent (11.01.23 @ 07:42) >    IMPRESSION:  Clear lungs.    ---End of Report ---        < end of copied text >  < from: TTE W or WO Ultrasound Enhancing Agent (11.01.23 @ 10:23) >  _____________________________     CONCLUSIONS:      1. Left ventricular cavity is moderately dilated. Left ventricular wall thickness is normal. Left ventricular systolic function is severely decreased with an ejection fraction of 32 % by Chinchilla's method of disks. Regional wall motion abnormalities present.   2. Multiple segmental abnormalities exist. See findings.   3. There is moderate (grade 2) left ventricular diastolic dysfunction, with indeterminant filling pressure.   4. Normal right ventricular cavity size, wall thickness, and systolic function.   5. No significant valvular disease.   6. No pericardial effusion seen.   7. Compared to the transthoracic echocardiogram performed on 1/25/2017 the areas of akinesis are unchanged but there has been a decline in LV systolic function with new areas of hypokinesis.    __________________________________________________________________    < end of copied text >  < from: TTE Limited W or WO Ultrasound Enhancing Agent (11.02.23 @ 07:41) >  __________________________     CONCLUSIONS:      1. After obtaining consent, Definity ultrasound enhancing agent was given for enhanced left ventricular opacification and improved delineation of the left ventricular endocardial borders. Left ventricular systolic function is severely decreased with a calculated ejection fraction of 22 % by the Chinchilla's biplane method of disks. There is a left ventricular thrombus.   2. Findings were discussed with Litzy BOSS on 11/2/2023 at 8.49am.   3. There is a left ventricular thrombus.    _________________________________________________________________    < end of copied text >   LD VALENCIA  58y Male  MRN:[id number]    Patient is a 58y old  Male who presents with a chief complaint of NSTEMI (01 Nov 2023 20:29)    HPI:  59yo M w/ hx HTN, CAD w/ 1 stent in 2009, ICH (2008) presenting with abn ekg. Patient presented to [facility] where he was found to have STEMI, recommended to get cath however patient did not want to get it there so it left and came here.  Patient initially had cough, congestion, fever, was placed on antibiotics on Sunday.  Started feeling nauseous and had a presyncopal event after which he presented to ED last night.  Had chest pain as well.  Chest pain is midsternal.  Not currently having chest pain.  Received 4 aspirin 30 min pta. (01 Nov 2023 15:11)      Patient seen and evaluated at bedside. interval events noted    Interval HPI:   no acute events o/n     PAST MEDICAL & SURGICAL HISTORY:  HTN (hypertension)      CAD (coronary artery disease)  2009; stent      Intracranial hemorrhage  2008      Respiratory arrest  december 1st      Myocardial infarction, unspecified MI type, unspecified artery      History of coronary artery stent placement          REVIEW OF SYSTEMS:  as per hpi     VITALS:      Vital Signs Last 24 Hrs  T(C): 36.7 (02 Jan 2024 15:14), Max: 37.1 (02 Jan 2024 07:06)  T(F): 98 (02 Jan 2024 15:14), Max: 98.7 (02 Jan 2024 07:06)  HR: 99 (02 Jan 2024 15:14) (93 - 101)  BP: 123/68 (02 Jan 2024 15:14) (105/71 - 127/70)  BP(mean): 87 (02 Jan 2024 15:14) (83 - 92)  RR: 18 (02 Jan 2024 15:14) (16 - 18)  SpO2: 96% (02 Jan 2024 15:14) (94% - 97%)    Parameters below as of 02 Jan 2024 15:14  Patient On (Oxygen Delivery Method): room air          PHYSICAL EXAM:  GENERAL: NAD, comfortable.    HEAD:  Atraumatic, Normocephalic  EYES: EOMI, PERRLA, conjunctiva and sclera clear  NECK:  No JVD.    CHEST/LUNG: Clear to auscultation bilaterally; No wheeze +chest tubes  HEART: Regular rate and rhythm; No murmurs, rubs, or gallops  ABDOMEN: Soft, Nontender, Nondistended; Bowel sounds present  EXTREMITIES:  2+ Peripheral Pulses, No clubbing, cyanosis, or edema  NEUROLOGY: a0x3          Consultant(s) Notes Reviewed:  [x ] YES  [ ] NO  Care Discussed with Consultants/Other Providers [ x] YES  [ ] NO    MEDS:   MEDICATIONS  (STANDING):  acetaminophen     Tablet .. 650 milliGRAM(s) Oral every 6 hours  budesonide  80 MICROgram(s)/formoterol 4.5 MICROgram(s) Inhaler 1 Puff(s) Inhalation two times a day  buMETAnide Injectable 1 milliGRAM(s) IV Push <User Schedule>  chlorhexidine 4% Liquid 1 Application(s) Topical <User Schedule>  dextrose 5%. 1000 milliLiter(s) (50 mL/Hr) IV Continuous <Continuous>  dextrose 5%. 1000 milliLiter(s) (100 mL/Hr) IV Continuous <Continuous>  dextrose 50% Injectable 50 milliLiter(s) IV Push every 15 minutes  fluconAZOLE   Tablet 100 milliGRAM(s) Oral <User Schedule>  glucagon  Injectable 1 milliGRAM(s) IntraMuscular once  hydrALAZINE 100 milliGRAM(s) Oral every 8 hours  insulin lispro (ADMELOG) corrective regimen sliding scale   SubCutaneous three times a day before meals  insulin lispro (ADMELOG) corrective regimen sliding scale   SubCutaneous at bedtime  insulin lispro Injectable (ADMELOG) 10 Unit(s) SubCutaneous three times a day before meals  lidocaine   4% Patch 3 Patch Transdermal daily  magnesium oxide 400 milliGRAM(s) Oral three times a day with meals  methylPREDNISolone sodium succinate Injectable 12 milliGRAM(s) IV Push every 12 hours  methylPREDNISolone sodium succinate Injectable   IV Push   metoclopramide Injectable 10 milliGRAM(s) IV Push every 8 hours  mycophenolate mofetil 1000 milliGRAM(s) Oral every 12 hours  naloxegol 25 milliGRAM(s) Oral daily  NIFEdipine XL 60 milliGRAM(s) Oral every 12 hours  pantoprazole  Injectable 40 milliGRAM(s) IV Push daily  polyethylene glycol 3350 17 Gram(s) Oral daily  pregabalin 50 milliGRAM(s) Oral every 8 hours  senna 2 Tablet(s) Oral at bedtime  tacrolimus 6 milliGRAM(s) Oral <User Schedule>  trimethoprim   80 mG/sulfamethoxazole 400 mG 1 Tablet(s) Oral daily  valGANciclovir 450 milliGRAM(s) Oral every 12 hours    MEDICATIONS  (PRN):  dextrose Oral Gel 15 Gram(s) Oral once PRN Blood Glucose LESS THAN 70 milliGRAM(s)/deciliter        Allergies    penicillins (Unknown)    Intolerances        LABS:                                                              11.9   9.88  )-----------( 221      ( 02 Jan 2024 07:26 )             34.5   01-02    130<L>  |  91<L>  |  37<H>  ----------------------------<  191<H>  5.2   |  26  |  0.87    Ca    9.8      02 Jan 2024 07:26  Phos  2.6     01-02  Mg     1.5     01-02    TPro  6.9  /  Alb  4.4  /  TBili  0.6  /  DBili  x   /  AST  31  /  ALT  79<H>  /  AlkPhos  54  01-02      < from: CT Abdomen and Pelvis No Cont (11.28.23 @ 03:38) >  IMPRESSION:  Mildly dilated colon and prominent but not overly dilated small bowel   with air-fluid levels. No discrete transition point. Findings are   suggestive of ileus.    Intra-aortic balloon pump with the inferior marker at the level of the   inferior mesenteric artery and the balloon overlying the origins of the   renal arteries, celiac artery, and superior mesenteric artery. Consider   repositioning. Concern for IABP positioning was discussed with LANETTE Hawthorne   on 11/28/2023 at 3:42 AM by Dr. Shepard.    < end of copied text >          < from: Colonoscopy (11.17.23 @ 10:35) >                                                                                                        Impression:          - The entire examined colon is normal on direct and retroflexion views.                       - No specimens collected.  Recommendation:      - Resume previous diet today.                       - No large polyps or masses detected, No objection from GI standpoint to                 proceed with heart transplantation/advanced heart therapies.                       - Please call back should any further questions or concerns arise.    < end of copied text >     < from: Xray Chest 1 View- PORTABLE-Urgent (11.01.23 @ 07:42) >    IMPRESSION:  Clear lungs.    ---End of Report ---        < end of copied text >  < from: TTE W or WO Ultrasound Enhancing Agent (11.01.23 @ 10:23) >  _____________________________     CONCLUSIONS:      1. Left ventricular cavity is moderately dilated. Left ventricular wall thickness is normal. Left ventricular systolic function is severely decreased with an ejection fraction of 32 % by Chinchilla's method of disks. Regional wall motion abnormalities present.   2. Multiple segmental abnormalities exist. See findings.   3. There is moderate (grade 2) left ventricular diastolic dysfunction, with indeterminant filling pressure.   4. Normal right ventricular cavity size, wall thickness, and systolic function.   5. No significant valvular disease.   6. No pericardial effusion seen.   7. Compared to the transthoracic echocardiogram performed on 1/25/2017 the areas of akinesis are unchanged but there has been a decline in LV systolic function with new areas of hypokinesis.    __________________________________________________________________    < end of copied text >  < from: TTE Limited W or WO Ultrasound Enhancing Agent (11.02.23 @ 07:41) >  __________________________     CONCLUSIONS:      1. After obtaining consent, Definity ultrasound enhancing agent was given for enhanced left ventricular opacification and improved delineation of the left ventricular endocardial borders. Left ventricular systolic function is severely decreased with a calculated ejection fraction of 22 % by the Chinchilla's biplane method of disks. There is a left ventricular thrombus.   2. Findings were discussed with Litzy BOSS on 11/2/2023 at 8.49am.   3. There is a left ventricular thrombus.    _________________________________________________________________    < end of copied text >

## 2024-01-02 NOTE — PROGRESS NOTE ADULT - ASSESSMENT
57 yo male h/o htn, cad s/p pci, ICH, here with NSTEMI  s/p intubation and cath. now in CCU    NSTEMI  s/p  cath  cath results noted. multi vessel dz., CTSx f/u.    pt with vtach/fib arrest again on 11/8. s/p ACLS and ROSC.   in ccu with iabp   plan for transplant as per HF and transplant team  transplant w/u ongoing.   s/p colonoscopy 11/17. normal  now listed for transplant as of 11/22 12/25: pt s/p heart transplant last night. remains intubated with iabp in CTU.   12/26: pt extubated to high flow this am. IABP currently being removed. pt a0x3.   12/27: pt feels well. walked with PT this am. currently comforable sitting in chair. IABP removed. wean off pressors as able.   12/28-29: no acute events o/n. weaning down ionotropes/pressors as able. immunosuppressives as per transplant team. PT  12/30: feels well. pressors/ionotropes weaned off. removal of chest tubes as able. immunosupressives as per id. PT.  12/31: feels well. tx out of ctu to step down unit. cont care as per ctsx team. PT  1/1: no acute events o/n. to transition off insulin gtt today. PT. d/w family bedside  1/2: no acute events o/n. plan on removal of a line and chest tubes today as per ctsx. dm mngt as per endo      mngt as per CTU team          Advanced care planning was discussed with patient and family.  Advanced care planning forms were reviewed and discussed as appropriate.  Differential diagnosis and plan of care discussed with patient after the evaluation.   Pain assessed and judicious use of narcotics when appropriate was discussed.  Importance of Fall prevention discussed.  Counseling on Smoking and Alcohol cessation was offered when appropriate.  Counseling on Diet, exercise, and medication compliance was done.       Approx 75 minutes spent. 59 yo male h/o htn, cad s/p pci, ICH, here with NSTEMI  s/p intubation and cath. now in CCU    NSTEMI  s/p  cath  cath results noted. multi vessel dz., CTSx f/u.    pt with vtach/fib arrest again on 11/8. s/p ACLS and ROSC.   in ccu with iabp   plan for transplant as per HF and transplant team  transplant w/u ongoing.   s/p colonoscopy 11/17. normal  now listed for transplant as of 11/22 12/25: pt s/p heart transplant last night. remains intubated with iabp in CTU.   12/26: pt extubated to high flow this am. IABP currently being removed. pt a0x3.   12/27: pt feels well. walked with PT this am. currently comforable sitting in chair. IABP removed. wean off pressors as able.   12/28-29: no acute events o/n. weaning down ionotropes/pressors as able. immunosuppressives as per transplant team. PT  12/30: feels well. pressors/ionotropes weaned off. removal of chest tubes as able. immunosupressives as per id. PT.  12/31: feels well. tx out of ctu to step down unit. cont care as per ctsx team. PT  1/1: no acute events o/n. to transition off insulin gtt today. PT. d/w family bedside  1/2: no acute events o/n. plan on removal of a line and chest tubes today as per ctsx. dm mngt as per endo      mngt as per CTU team          Advanced care planning was discussed with patient and family.  Advanced care planning forms were reviewed and discussed as appropriate.  Differential diagnosis and plan of care discussed with patient after the evaluation.   Pain assessed and judicious use of narcotics when appropriate was discussed.  Importance of Fall prevention discussed.  Counseling on Smoking and Alcohol cessation was offered when appropriate.  Counseling on Diet, exercise, and medication compliance was done.       Approx 75 minutes spent.

## 2024-01-02 NOTE — PROGRESS NOTE ADULT - SUBJECTIVE AND OBJECTIVE BOX
Behavioral Cardiology Progress Note     History of Present Illness: Mr. Hardy is a 58 year-old man with history of HTN, CAD (1 stent in 2009), ICH (2008) presented on 11/1 with abnormal EKG. Patient presented to [facility] where he was found to have STEMI, recommended to get cath however patient did not want treatment at OSH so left and came to [facility]. EKG here with ST depression in lateral leads and elevation in anterior leads. Prior to C found to be tachycardic, dyspneic, intubated. LHC 11/1 with chronic total occlusion of LAD and RCA, with elevated RA and PA pressures. TTE 11/1 with severely decreased EF 32%, s/p IABP 11/1.     Social history: Mr. Martin Hardy is a 58-year-old [place] American,  male with three children, including two sons and three daughters. 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 His wife, Brynn, works as an  in a public high school in [facility]. Mr. Hardy reported three close friendships with men who live in [place] and [place]. Notably, one of these men, Dr. Brody Le, is Mr. Hardy’s healthcare proxy and PCP. Mr. Hardy also reported that these friends feel like family due to their closeness. Currently, Mr. Hardy lives in a condominium apartment in [facility] with his wife and children. 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 Mr. Hardy noted his parents live in [place] as does one brother and one sister. Mr. Hardy has another sister, diagnosed with bipolar disorder, who lives in [place]. He described feeling distant from his siblings, as they lack a close relationship.      Substance use:    • Tobacco: Denies current and past tobacco use.    • Alcohol: Denies current and past alcohol use.   • Drug: Denies current and past drug use.      Past psychiatric history: Mr. Hardy denied any history of self-harm and suicidal ideation, plan, or attempt. He also denied any history of violent thoughts or behaviors. Denies history of outpatient treatment with a therapist or psychiatrist. With this said, he reported a history of odd thinking related, health-related anxiety. For example, Mr. Hardy reported meeting with a physician about 12 years ago because he feared having a brain tumor. He believed that the presence of a brain tumor could explain his superior memory. Earlier this year, he revealed anxiety to his PCP, who prescribed Mr. Hardy Lorazepam (0.5mg PRN). Per medical chart, Mr. Hardy filled his prescription three separate times in the past year. He reported concern about dependency, given Lorazepam’s status as a controlled medication, and desire to avoid substance abuse.      Psychological assessment: Mr. Hardy reported mood was "angry." He reported a frustrating experience in his current unit, describing it as a "scheme or mask." He claimed that his providers were incompetent and unprofessional. When asked if he was willing to discuss his frustration, Mr. Hardy asked why he was not meeting with Dr. Villalobos. He noted that this writer does not need to involve himself in Mr. Hardy's matters, as Mr. Hardy has  "working on the matter as we speak." Mr. Hardy denied any interest in talking further about his concerns, and he was not receptive to supportive therapy or CBT skills.    Mental Status Exam: This writer met with Mr. Hardy twice. In both sessions, Mr. Hardy was seen lying in bed on CTU. In the first encounter, Mr. Hardy recorded the conversation (on his cell phone) but reported that he was too "sedated" to talk. In the second session, Mr. Hardy's reported mood was "angry." Awake, alert. Affect was sedated. Oriented x4. Uncooperative. He made good eye contact. Thought process was goal directed and content focused on frustrations. Immediate judgement was poor.     Dx: Adjustment Disorder with anxiety    Recommendations:     ·	Psychology will continue to follow for supportive therapy and CBT strategies to manage anxiety.  ·	Seen by psychiatry (11/8), will benefit from follow up.   ·	Holistic Nurse.   ·	LVAD and heart transplant support group information provided.       20 minutes spent on total patient encounter      Remy Jacobs MA  Psychology Extern     I have made amendments to the documentation where necessary.   Karla Villalobos, PhD   Supervising Psychologist    Behavioral Cardiology Progress Note     History of Present Illness: Mr. Hardy is a 58 year-old man with history of HTN, CAD (1 stent in 2009), ICH (2008) presented on 11/1 with abnormal EKG. Patient presented to [facility] where he was found to have STEMI, recommended to get cath however patient did not want treatment at OSH so left and came to [facility]. EKG here with ST depression in lateral leads and elevation in anterior leads. Prior to C found to be tachycardic, dyspneic, intubated. LHC 11/1 with chronic total occlusion of LAD and RCA, with elevated RA and PA pressures. TTE 11/1 with severely decreased EF 32%, s/p IABP 11/1.     Social history: Mr. Martin Hardy is a 58-year-old Maldivian American,  male with three children, including two sons and three daughters. 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 His wife, Brynn, works as an  in a public high school in [facility]. Mr. Hardy reported three close friendships with men who live in [place]. Notably, one of these men, Dr. Brody Le, is Mr. Hardy’s healthcare proxy and PCP. Mr. Hardy also reported that these friends feel like family due to their closeness. Currently, Mr. Hardy lives in a condominium apartment in [facility] with his wife and children. 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 Mr. Hardy noted his parents live in [place] as does one brother and one sister. Mr. Hardy has another sister, diagnosed with bipolar disorder, who lives in [place]. He described feeling distant from his siblings, as they lack a close relationship.      Substance use:    • Tobacco: Denies current and past tobacco use.    • Alcohol: Denies current and past alcohol use.   • Drug: Denies current and past drug use.      Past psychiatric history: Mr. Hardy denied any history of self-harm and suicidal ideation, plan, or attempt. He also denied any history of violent thoughts or behaviors. Denies history of outpatient treatment with a therapist or psychiatrist. With this said, he reported a history of odd thinking related, health-related anxiety. For example, Mr. Hardy reported meeting with a physician about 12 years ago because he feared having a brain tumor. He believed that the presence of a brain tumor could explain his superior memory. Earlier this year, he revealed anxiety to his PCP, who prescribed Mr. Hardy Lorazepam (0.5mg PRN). Per medical chart, Mr. Hardy filled his prescription three separate times in the past year. He reported concern about dependency, given Lorazepam’s status as a controlled medication, and desire to avoid substance abuse.      Psychological assessment: Mr. Hardy reported mood was "angry." He reported a frustrating experience in his current unit, describing it as a "scheme or mask." He claimed that his providers were incompetent and unprofessional. When asked if he was willing to discuss his frustration, Mr. Hardy asked why he was not meeting with Dr. Villalobos. He noted that this writer does not need to involve himself in Mr. Hardy's matters, as Mr. Hardy has  "working on the matter as we speak." Mr. Hardy denied any interest in talking further about his concerns, and he was not receptive to supportive therapy or CBT skills.    Mental Status Exam: This writer met with Mr. Hardy twice. In both sessions, Mr. Hardy was seen lying in bed on CTU. In the first encounter, Mr. Hardy recorded the conversation (on his cell phone) but reported that he was too "sedated" to talk. In the second session, Mr. Hardy's reported mood was "angry." Awake, alert. Affect was sedated. Oriented x4. Uncooperative. He made good eye contact. Thought process was goal directed and content focused on frustrations. Immediate judgement was poor.     Dx: Adjustment Disorder with anxiety    Recommendations:     ·	Psychology will continue to follow for supportive therapy and CBT strategies to manage anxiety.  ·	Seen by psychiatry (11/8), will benefit from follow up.   ·	Holistic Nurse.   ·	LVAD and heart transplant support group information provided.       20 minutes spent on total patient encounter      Remy Jacobs MA  Psychology Extern     I have made amendments to the documentation where necessary.   Karla Villalobos, PhD   Supervising Psychologist    Behavioral Cardiology Progress Note     History of Present Illness: Mr. Hardy is a 58 year-old man with history of HTN, CAD (1 stent in 2009), ICH (2008) presented on 11/1 with abnormal EKG. Patient presented to [facility] where he was found to have STEMI, recommended to get cath however patient did not want treatment at OSH so left and came to [facility]. EKG here with ST depression in lateral leads and elevation in anterior leads. Prior to C found to be tachycardic, dyspneic, intubated. LHC 11/1 with chronic total occlusion of LAD and RCA, with elevated RA and PA pressures. TTE 11/1 with severely decreased EF 32%, s/p IABP 11/1.     Social history: Mr. Martin Hardy is a 58-year-old Haitian American,  male with three children, including two sons and three daughters. 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 His wife, Brynn, works as an  in a public high school in [facility]. Mr. Hardy reported three close friendships with men who live in [place] and [place]. Notably, one of these men, Dr. Brody Le, is Mr. Hardy’s healthcare proxy and PCP. Mr. Hardy also reported that these friends feel like family due to their closeness. Currently, Mr. Hardy lives in a condominium apartment in [facility] with his wife and children. 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 Mr. Hardy noted his parents live in [place] as does one brother and one sister. Mr. Hardy has another sister, diagnosed with bipolar disorder, who lives in [place]. He described feeling distant from his siblings, as they lack a close relationship.      Substance use:    • Tobacco: Denies current and past tobacco use.    • Alcohol: Denies current and past alcohol use.   • Drug: Denies current and past drug use.      Past psychiatric history: Mr. Hardy denied any history of self-harm and suicidal ideation, plan, or attempt. He also denied any history of violent thoughts or behaviors. Denies history of outpatient treatment with a therapist or psychiatrist. With this said, he reported a history of odd thinking related, health-related anxiety. For example, Mr. Hardy reported meeting with a physician about 12 years ago because he feared having a brain tumor. He believed that the presence of a brain tumor could explain his superior memory. Earlier this year, he revealed anxiety to his PCP, who prescribed Mr. Hardy Lorazepam (0.5mg PRN). Per medical chart, Mr. Hardy filled his prescription three separate times in the past year. He reported concern about dependency, given Lorazepam’s status as a controlled medication, and desire to avoid substance abuse.      Psychological assessment: Mr. Hardy reported mood was "angry." He reported a frustrating experience in his current unit, describing it as a "scheme or mask." He claimed that his providers were incompetent and unprofessional. When asked if he was willing to discuss his frustration, Mr. Hardy asked why he was not meeting with Dr. Villalobos. He noted that this writer does not need to involve himself in Mr. Hardy's matters, as Mr. Hardy has  "working on the matter as we speak." Mr. Hardy denied any interest in talking further about his concerns, and he was not receptive to supportive therapy or CBT skills.     Mental Status Exam: This writer met with Mr. Hardy twice. In both sessions, Mr. Hardy was seen lying in bed on CTU. In the first encounter, Mr. Hardy recorded the conversation (on his cell phone) but reported that he was too "sedated" to talk. In the second session, Mr. Hardy's reported mood was "angry." Awake, alert. Affect was sedated. Oriented x4. Uncooperative. He made good eye contact. Thought process was goal directed and content focused on frustrations. Immediate judgement was poor.     Dx: Adjustment Disorder with anxiety    Recommendations:     ·	Psychology will continue to follow for supportive therapy and CBT strategies to manage anxiety.  ·	Seen by psychiatry (11/8), will benefit from follow up.   ·	Holistic Nurse.   ·	LVAD and heart transplant support group information provided.       20 minutes spent on total patient encounter      Remy Jacobs MA  Psychology Extern     I have made amendments to the documentation where necessary.   Karla Villalobos, PhD   Supervising Psychologist    Behavioral Cardiology Progress Note     History of Present Illness: Mr. Hardy is a 58 year-old man with history of HTN, CAD (1 stent in 2009), ICH (2008) presented on 11/1 with abnormal EKG. Patient presented to [facility] where he was found to have STEMI, recommended to get cath however patient did not want treatment at OSH so left and came to [facility]. EKG here with ST depression in lateral leads and elevation in anterior leads. Prior to C found to be tachycardic, dyspneic, intubated. LHC 11/1 with chronic total occlusion of LAD and RCA, with elevated RA and PA pressures. TTE 11/1 with severely decreased EF 32%, s/p IABP 11/1.     Social history: Mr. Martin Hardy is a 58-year-old [place]ij[place] American,  male with three children, including two sons and three daughters. 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 His wife, Brynn, works as an  in a public high school in [facility]. Mr. Hardy reported three close friendships with men who live in [place]. Notably, one of these men, Dr. Brody Le, is Mr. Hardy’s healthcare proxy and PCP. Mr. Hardy also reported that these friends feel like family due to their closeness. Currently, Mr. Hardy lives in a condominium apartment in [facility] with his wife and children. 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 Mr. Hardy noted his parents live in [place] as does one brother and one sister. Mr. Hardy has another sister, diagnosed with bipolar disorder, who lives in [place]. He described feeling distant from his siblings, as they lack a close relationship.      Substance use:    • Tobacco: Denies current and past tobacco use.    • Alcohol: Denies current and past alcohol use.   • Drug: Denies current and past drug use.      Past psychiatric history: Mr. Hardy denied any history of self-harm and suicidal ideation, plan, or attempt. He also denied any history of violent thoughts or behaviors. Denies history of outpatient treatment with a therapist or psychiatrist. With this said, he reported a history of odd thinking related, health-related anxiety. For example, Mr. Hardy reported meeting with a physician about 12 years ago because he feared having a brain tumor. He believed that the presence of a brain tumor could explain his superior memory. Earlier this year, he revealed anxiety to his PCP, who prescribed Mr. Hardy Lorazepam (0.5mg PRN). Per medical chart, Mr. Hardy filled his prescription three separate times in the past year. He reported concern about dependency, given Lorazepam’s status as a controlled medication, and desire to avoid substance abuse.      Psychological assessment: Mr. Hardy reported mood was "angry." He reported a frustrating experience in his current unit, describing it as a "scheme or mask." He claimed that his providers were incompetent and unprofessional. When asked if he was willing to discuss his frustration, Mr. Hardy asked why he was not meeting with Dr. Villalobos. He noted that this writer does not need to involve himself in Mr. Hardy's matters, as Mr. Hardy has  "working on the matter as we speak." Mr. Hardy denied any interest in talking further about his concerns, and he was not receptive to supportive therapy or CBT skills.     Mental Status Exam: This writer met with Mr. Hardy twice. In both sessions, Mr. Hardy was seen lying in bed on CTU. In the first encounter, Mr. Hardy recorded the conversation (on his cell phone) but reported that he was too "sedated" to talk. In the second session, Mr. Hardy's reported mood was "angry." Awake, alert. Affect was sedated. Oriented x4. Uncooperative. He made good eye contact. Thought process was goal directed and content focused on frustrations. Immediate judgement was poor.     Dx: Adjustment Disorder with anxiety    Recommendations:     ·	Psychology will continue to follow for supportive therapy and CBT strategies to manage anxiety.  ·	Seen by psychiatry (11/8), will benefit from follow up.   ·	Holistic Nurse.   ·	LVAD and heart transplant support group information provided.       20 minutes spent on total patient encounter      Remy Jacobs MA  Psychology Extern     I have made amendments to the documentation where necessary.   Karla Villalobos, PhD   Supervising Psychologist    Behavioral Cardiology Progress Note     History of Present Illness: Mr. Hardy is a 58 year-old man with history of HTN, CAD (1 stent in 2009), ICH (2008) presented on 11/1 with abnormal EKG. Patient presented to [facility] where he was found to have STEMI, recommended to get cath however patient did not want treatment at OSH so left and came to [facility]. EKG here with ST depression in lateral leads and elevation in anterior leads. Prior to C found to be tachycardic, dyspneic, intubated. LHC 11/1 with chronic total occlusion of LAD and RCA, with elevated RA and PA pressures. TTE 11/1 with severely decreased EF 32%, s/p IABP 11/1.     Social history: Mr. Martin Hardy is a 58-year-old [place]oc[place] American,  male with three children, including two sons and three daughters. 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 His wife, Brynn, works as an  in a public high school in [facility]. Mr. Hardy reported three close friendships with men who live in [place]. Notably, one of these men, Dr. Brody Le, is Mr. Hardy’s healthcare proxy and PCP. Mr. Hardy also reported that these friends feel like family due to their closeness. Currently, Mr. Hardy lives in a condominium apartment in [facility] with his wife and children. 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 Mr. Hardy noted his parents live in [place] as does one brother and one sister. Mr. Hardy has another sister, diagnosed with bipolar disorder, who lives in [place]. He described feeling distant from his siblings, as they lack a close relationship.      Substance use:    • Tobacco: Denies current and past tobacco use.    • Alcohol: Denies current and past alcohol use.   • Drug: Denies current and past drug use.      Past psychiatric history: Mr. Hardy denied any history of self-harm and suicidal ideation, plan, or attempt. He also denied any history of violent thoughts or behaviors. Denies history of outpatient treatment with a therapist or psychiatrist. With this said, he reported a history of odd thinking related, health-related anxiety. For example, Mr. Hardy reported meeting with a physician about 12 years ago because he feared having a brain tumor. He believed that the presence of a brain tumor could explain his superior memory. Earlier this year, he revealed anxiety to his PCP, who prescribed Mr. Hardy Lorazepam (0.5mg PRN). Per medical chart, Mr. Hardy filled his prescription three separate times in the past year. He reported concern about dependency, given Lorazepam’s status as a controlled medication, and desire to avoid substance abuse.      Psychological assessment: Mr. Hardy reported mood was "angry." He reported a frustrating experience in his current unit, describing it as a "scheme or mask." He claimed that his providers were incompetent and unprofessional. When asked if he was willing to discuss his frustration, Mr. Hardy asked why he was not meeting with Dr. Villalobos. He noted that this writer does not need to involve himself in Mr. Hardy's matters, as Mr. Hardy has  "working on the matter as we speak." Mr. Hardy denied any interest in talking further about his concerns, and he was not receptive to supportive therapy or CBT skills. Above discussed with transplant team and psychiatry. Recent behavior may be related to steroid therapy vs delirium.      Mental Status Exam: This writer met with Mr. Hardy twice. In both sessions, Mr. Hardy was seen lying in bed on CTU. In the first encounter, Mr. Hardy recorded the conversation (on his cell phone) but reported that he was too "sedated" to talk. In the second session, Mr. Hardy's reported mood was "angry." Awake, alert. Affect was sedated. Oriented x4. Uncooperative. He made good eye contact. Thought process was goal directed and content focused on frustrations. Immediate judgement was poor.     Dx: Adjustment Disorder with anxiety       Recommendations:     ·	Psychology will continue to follow for supportive therapy and CBT strategies to manage anxiety.  ·	Follow up with psychiatry.    ·	Holistic Nurse.   ·	LVAD and heart transplant support group information provided.       20 minutes spent on total patient encounter      Remy Jacobs MA  Psychology Extern     I have made amendments to the documentation where necessary.   Karla Villalobos, PhD   Supervising Psychologist    Behavioral Cardiology Progress Note     History of Present Illness: Mr. Hardy is a 58 year-old man with history of HTN, CAD (1 stent in 2009), ICH (2008) presented on 11/1 with abnormal EKG. Patient presented to [facility] where he was found to have STEMI, recommended to get cath however patient did not want treatment at OSH so left and came to [facility]. EKG here with ST depression in lateral leads and elevation in anterior leads. Prior to C found to be tachycardic, dyspneic, intubated. LHC 11/1 with chronic total occlusion of LAD and RCA, with elevated RA and PA pressures. TTE 11/1 with severely decreased EF 32%, s/p IABP 11/1.     Social history: Mr. Martin Hardy is a 58-year-old [place] American,  male with three children, including two sons and three daughters. 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 His wife, Brynn, works as an  in a public high school in [facility]. Mr. Hardy reported three close friendships with men who live in [place]. Notably, one of these men, Dr. Brody Le, is Mr. Hardy’s healthcare proxy and PCP. Mr. Hardy also reported that these friends feel like family due to their closeness. Currently, Mr. Hardy lives in a condominium apartment in [facility] with his wife and children. 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 Mr. Hardy noted his parents live in [place] as does one brother and one sister. Mr. Hardy has another sister, diagnosed with bipolar disorder, who lives in [place]. He described feeling distant from his siblings, as they lack a close relationship.      Substance use:    • Tobacco: Denies current and past tobacco use.    • Alcohol: Denies current and past alcohol use.   • Drug: Denies current and past drug use.      Past psychiatric history: Mr. Hardy denied any history of self-harm and suicidal ideation, plan, or attempt. He also denied any history of violent thoughts or behaviors. Denies history of outpatient treatment with a therapist or psychiatrist. With this said, he reported a history of odd thinking related, health-related anxiety. For example, Mr. Hardy reported meeting with a physician about 12 years ago because he feared having a brain tumor. He believed that the presence of a brain tumor could explain his superior memory. Earlier this year, he revealed anxiety to his PCP, who prescribed Mr. Hardy Lorazepam (0.5mg PRN). Per medical chart, Mr. Hardy filled his prescription three separate times in the past year. He reported concern about dependency, given Lorazepam’s status as a controlled medication, and desire to avoid substance abuse.      Psychological assessment: Mr. Hardy reported mood was "angry." He reported a frustrating experience in his current unit, describing it as a "scheme or mask." He claimed that his providers were incompetent and unprofessional. When asked if he was willing to discuss his frustration, Mr. Hardy asked why he was not meeting with Dr. Villalobos. He noted that this writer does not need to involve himself in Mr. Hardy's matters, as Mr. Hardy has  "working on the matter as we speak." Mr. Hardy denied any interest in talking further about his concerns, and he was not receptive to supportive therapy or CBT skills. Above discussed with transplant team and psychiatry. Recent behavior may be related to steroid therapy vs delirium.      Mental Status Exam: This writer met with Mr. Hadry twice. In both sessions, Mr. Hardy was seen lying in bed on CTU. In the first encounter, Mr. Hardy recorded the conversation (on his cell phone) but reported that he was too "sedated" to talk. In the second session, Mr. Hardy's reported mood was "angry." Awake, alert. Affect was sedated. Oriented x4. Uncooperative. He made good eye contact. Thought process was goal directed and content focused on frustrations. Immediate judgement was poor.     Dx: Adjustment Disorder with anxiety       Recommendations:     ·	Psychology will continue to follow for supportive therapy and CBT strategies to manage anxiety.  ·	Follow up with psychiatry.    ·	Holistic Nurse.   ·	LVAD and heart transplant support group information provided.       20 minutes spent on total patient encounter      Remy Jacobs MA  Psychology Extern     I have made amendments to the documentation where necessary.   Karla Villalobos, PhD   Supervising Psychologist

## 2024-01-02 NOTE — PROGRESS NOTE ADULT - PROBLEM SELECTOR PLAN 1
- IABP removed 12/26  - Sabrina and Epi weaned off 12/26  - Procardia XL 60 mg BID  - Bumex TID  - 1st RHC/EMBx on Tuesday 1/2  - Transfer to Stepdown 12/31  -  Right femerol rangel in place   - Med CT x 3 - LWS    - d/c planning - aniticipate home

## 2024-01-02 NOTE — PROGRESS NOTE ADULT - SUBJECTIVE AND OBJECTIVE BOX
VITAL SIGNS-Telemetry:  SR   Vital Signs Last 24 Hrs  T(C): 36.6 (01-02-24 @ 02:51), Max: 36.8 (01-01-24 @ 23:53)  T(F): 97.8 (01-02-24 @ 02:51), Max: 98.3 (01-01-24 @ 23:53)  HR: 93 (01-02-24 @ 02:51) (93 - 113)  BP: 122/76 (01-02-24 @ 02:51) (104/70 - 133/72)  RR: 18 (01-02-24 @ 02:51) (18 - 18)  SpO2: 97% (01-02-24 @ 02:51) (96% - 98%)         01-01 @ 07:01  -  01-02 @ 07:00  --------------------------------------------------------  IN: 290 mL / OUT: 3300 mL / NET: -3010 mL    Daily     Daily     CAPILLARY BLOOD GLUCOSE  POCT Blood Glucose.: 144 mg/dL (02 Jan 2024 04:16)  POCT Blood Glucose.: 110 mg/dL (02 Jan 2024 02:49)  POCT Blood Glucose.: 93 mg/dL (02 Jan 2024 02:10)  POCT Blood Glucose.: 116 mg/dL (02 Jan 2024 01:17)  POCT Blood Glucose.: 174 mg/dL (01 Jan 2024 23:54)  POCT Blood Glucose.: 174 mg/dL (01 Jan 2024 22:29)    Drains:     MS  1&3      [ x ] Drainage0/30cc:        ms2  [ x ]  Drainage:  0/70cc  Pacing Wires        [  ]   Settings:                                  Isolated  [ x ]    PHYSICAL EXAM:  Neurology: alert and oriented x 3, nonfocal, no gross deficits  CV : S1S2  Sternal Wound :  CDI , Stable  Lungs: cta  Abdomen: soft, nontender, nondistended, positive bowel sounds, last bowel movement       12/31  Extremities:    no edema no calf tenderness  Right groin femerol rangel in place      acetaminophen     Tablet .. 650 milliGRAM(s) Oral every 6 hours  budesonide  80 MICROgram(s)/formoterol 4.5 MICROgram(s) Inhaler 1 Puff(s) Inhalation two times a day  buMETAnide Injectable 1 milliGRAM(s) IV Push <User Schedule>  chlorhexidine 4% Liquid 1 Application(s) Topical <User Schedule>  dextrose 5%. 1000 milliLiter(s) IV Continuous <Continuous>  dextrose 5%. 1000 milliLiter(s) IV Continuous <Continuous>  dextrose 50% Injectable 50 milliLiter(s) IV Push every 15 minutes  dextrose Oral Gel 15 Gram(s) Oral once PRN  fluconAZOLE   Tablet 100 milliGRAM(s) Oral <User Schedule>  glucagon  Injectable 1 milliGRAM(s) IntraMuscular once  hydrALAZINE 100 milliGRAM(s) Oral every 8 hours  insulin glargine Injectable (LANTUS) 20 Unit(s) SubCutaneous every morning  insulin lispro (ADMELOG) corrective regimen sliding scale   SubCutaneous three times a day before meals  insulin lispro (ADMELOG) corrective regimen sliding scale   SubCutaneous at bedtime  insulin lispro Injectable (ADMELOG) 10 Unit(s) SubCutaneous three times a day before meals  lidocaine   4% Patch 3 Patch Transdermal daily  magnesium citrate Oral Solution 296 milliLiter(s) Oral daily  methylPREDNISolone sodium succinate Injectable 12 milliGRAM(s) IV Push every 12 hours  methylPREDNISolone sodium succinate Injectable   IV Push   metoclopramide Injectable 10 milliGRAM(s) IV Push every 8 hours  mycophenolate mofetil 1000 milliGRAM(s) Oral every 12 hours  naloxegol 25 milliGRAM(s) Oral daily  NIFEdipine XL 60 milliGRAM(s) Oral every 12 hours  pantoprazole  Injectable 40 milliGRAM(s) IV Push daily  polyethylene glycol 3350 17 Gram(s) Oral daily  pregabalin 50 milliGRAM(s) Oral every 8 hours  senna 2 Tablet(s) Oral at bedtime  tacrolimus 6 milliGRAM(s) Oral <User Schedule>  trimethoprim   80 mG/sulfamethoxazole 400 mG 1 Tablet(s) Oral daily  valGANciclovir 450 milliGRAM(s) Oral every 12 hours    Physical Therapy Rec:   Home  [ x ]   Home w/ PT  [  ]  Rehab  [  ]  Discussed with Cardiothoracic Team at AM rounds.

## 2024-01-02 NOTE — PROGRESS NOTE ADULT - PROBLEM SELECTOR PLAN 2
- s/p Simulect on POD 0, received second dose on POD 4 (12/29).  - Continue with Steroid taper per protocol   - Continue with Cellcept 1g IV BID  - Will continue to adjust tacro as needed for goal will be 12-14. - s/p Simulect on POD 12/25 and received second dose on POD 4 (12/29).  - Continue with solumedrol taper per protocol, plan to transition to prednisone within the upcoming days   - Continue with Cellcept 1g PO BID  - Will continue to adjust tacro as needed for goal will be 12-14. Now on fluconazole in order to help boost tacro levels

## 2024-01-02 NOTE — PROGRESS NOTE ADULT - ASSESSMENT
59 yo M with PMHx of HTN, CAD w/ 1 stent in 2009, ICH (2008) presented on 11/1 with abn ekg. Patient presented to [facility] where he was found to have STEMI, recommended to get cath however patient did not want to get it there so he left and came here.   EKG here with ST depression in lateral leads and elevation in anterior leads   Prior to C found to be tachycardic, dyspneic, intubated   [facility] 11/1 with chronic total occlusion of LAD and RCA, with elevated RA and PA pressures  TTE 11/1 with severely decreased EF 32%, s/p IABP 11/1        #s/p OHT 12/25/23 CMV D-/R+; Toxo D-/R-  Induction simulect and MPN  Maintenance tacro/MMF  ·  Plan: - CMV -/+: intermediate risk.  Will need to start on valganciclovir when able X 6 months.    - Toxo -/-: none required  - PJP ppx: will introduce eventually   - Trush ppx: eventual Nystatin.  - donor HCV TIM( -) but HCV antibody +.  - on daptomycin ppx for hx of rash to vancomycin  - donor sputum + for staph but with would not influence risk of infection in heart recipient    #pretransplant E faecalis bacteremia in c/o colonoscopy  completed therapy    #Rash- to vancomycin?  resolved    # hep B and Hep A not immune  received vaccine series but second dose given early  will need repeat ab titers    # Bacteremia.   ·  Plan: BCx from 11/17 with GPC in pair/chains in both bottles; Mixed cultures Staph epi and Enterococcus faecalis . Repeat cultures from 11/18; NGTD  + rods in blood culture on 11/30 (reported on 12/4= cutibacterium), repeat BCxs Negative Completed abx course.   receiving Daptomycin perioperative prophylaxis      Recommendations  ·	Completed perioperative ppx cefepime and daptomycin for 48 hours (the latter already discontinued)  ·	Donor with sputum staphylococcus- in the absence of donor bacteremia would not affect heart recipient outcomes and would not need further prophylaxis for this  ·	Valcyte and bactrim ppx as soon as able        Chao Peters MD  Can be called via Teams  After 5pm/weekends [phone]     59 yo M with PMHx of HTN, CAD w/ 1 stent in 2009, ICH (2008) presented on 11/1 with abn ekg. Patient presented to [facility] where he was found to have STEMI, recommended to get cath however patient did not want to get it there so he left and came here.   EKG here with ST depression in lateral leads and elevation in anterior leads   Prior to C found to be tachycardic, dyspneic, intubated   [facility] 11/1 with chronic total occlusion of LAD and RCA, with elevated RA and PA pressures  TTE 11/1 with severely decreased EF 32%, s/p IABP 11/1        #s/p OHT 12/25/23 CMV D-/R+; Toxo D-/R-  Induction simulect and MPN  Maintenance tacro/MMF  ·  Plan: - CMV -/+: intermediate risk.  Will need to start on valganciclovir when able X 6 months.    - Toxo -/-: none required  - PJP ppx: will introduce eventually   - Trush ppx: eventual Nystatin.  - donor HCV TIM( -) but HCV antibody +.  - on daptomycin ppx for hx of rash to vancomycin  - donor sputum + for staph but with would not influence risk of infection in heart recipient    #pretransplant E faecalis bacteremia in c/o colonoscopy  completed therapy    #Rash- to vancomycin?  resolved    # hep B and Hep A not immune  received vaccine series but second dose given early  will need repeat ab titers    # Bacteremia.   ·  Plan: BCx from 11/17 with GPC in pair/chains in both bottles; Mixed cultures Staph epi and Enterococcus faecalis . Repeat cultures from 11/18; NGTD  + rods in blood culture on 11/30 (reported on 12/4= cutibacterium), repeat BCxs Negative Completed abx course.   receiving Daptomycin perioperative prophylaxis      Recommendations  ·	Completed perioperative ppx cefepime and daptomycin for 48 hours (the latter already discontinued)  ·	Donor with sputum staphylococcus- in the absence of donor bacteremia would not affect heart recipient outcomes and would not need further prophylaxis for this  ·	Valcyte and bactrim ppx as soon as able        Chao Peters MD  Can be called via Teams  After 5pm/weekends [phone]

## 2024-01-02 NOTE — PROGRESS NOTE ADULT - PROBLEM SELECTOR PLAN 4
insulin gtt  house endo following   ?lantus & premeal tomorrow  1/1 noncompliant with diabetic diet- glucose 574 - ordering extra trays & bagel  importance of glycemic control reviewed with pt.

## 2024-01-03 DIAGNOSIS — Z94.1 HEART TRANSPLANT STATUS: ICD-10-CM

## 2024-01-03 LAB
ANION GAP SERPL CALC-SCNC: 17 MMOL/L — SIGNIFICANT CHANGE UP (ref 5–17)
ANION GAP SERPL CALC-SCNC: 17 MMOL/L — SIGNIFICANT CHANGE UP (ref 5–17)
BUN SERPL-MCNC: 55 MG/DL — HIGH (ref 7–23)
BUN SERPL-MCNC: 55 MG/DL — HIGH (ref 7–23)
CALCIUM SERPL-MCNC: 9.9 MG/DL — SIGNIFICANT CHANGE UP (ref 8.4–10.5)
CALCIUM SERPL-MCNC: 9.9 MG/DL — SIGNIFICANT CHANGE UP (ref 8.4–10.5)
CHLORIDE SERPL-SCNC: 93 MMOL/L — LOW (ref 96–108)
CHLORIDE SERPL-SCNC: 93 MMOL/L — LOW (ref 96–108)
CO2 SERPL-SCNC: 21 MMOL/L — LOW (ref 22–31)
CO2 SERPL-SCNC: 21 MMOL/L — LOW (ref 22–31)
CREAT SERPL-MCNC: 1.26 MG/DL — SIGNIFICANT CHANGE UP (ref 0.5–1.3)
CREAT SERPL-MCNC: 1.26 MG/DL — SIGNIFICANT CHANGE UP (ref 0.5–1.3)
CULTURE RESULTS: SIGNIFICANT CHANGE UP
CULTURE RESULTS: SIGNIFICANT CHANGE UP
EGFR: 66 ML/MIN/1.73M2 — SIGNIFICANT CHANGE UP
EGFR: 66 ML/MIN/1.73M2 — SIGNIFICANT CHANGE UP
GLUCOSE BLDC GLUCOMTR-MCNC: 111 MG/DL — HIGH (ref 70–99)
GLUCOSE BLDC GLUCOMTR-MCNC: 111 MG/DL — HIGH (ref 70–99)
GLUCOSE BLDC GLUCOMTR-MCNC: 113 MG/DL — HIGH (ref 70–99)
GLUCOSE BLDC GLUCOMTR-MCNC: 113 MG/DL — HIGH (ref 70–99)
GLUCOSE BLDC GLUCOMTR-MCNC: 129 MG/DL — HIGH (ref 70–99)
GLUCOSE BLDC GLUCOMTR-MCNC: 129 MG/DL — HIGH (ref 70–99)
GLUCOSE BLDC GLUCOMTR-MCNC: 148 MG/DL — HIGH (ref 70–99)
GLUCOSE BLDC GLUCOMTR-MCNC: 148 MG/DL — HIGH (ref 70–99)
GLUCOSE BLDC GLUCOMTR-MCNC: 173 MG/DL — HIGH (ref 70–99)
GLUCOSE BLDC GLUCOMTR-MCNC: 173 MG/DL — HIGH (ref 70–99)
GLUCOSE BLDC GLUCOMTR-MCNC: 175 MG/DL — HIGH (ref 70–99)
GLUCOSE BLDC GLUCOMTR-MCNC: 175 MG/DL — HIGH (ref 70–99)
GLUCOSE BLDC GLUCOMTR-MCNC: 184 MG/DL — HIGH (ref 70–99)
GLUCOSE BLDC GLUCOMTR-MCNC: 184 MG/DL — HIGH (ref 70–99)
GLUCOSE BLDC GLUCOMTR-MCNC: 204 MG/DL — HIGH (ref 70–99)
GLUCOSE BLDC GLUCOMTR-MCNC: 204 MG/DL — HIGH (ref 70–99)
GLUCOSE BLDC GLUCOMTR-MCNC: 82 MG/DL — SIGNIFICANT CHANGE UP (ref 70–99)
GLUCOSE BLDC GLUCOMTR-MCNC: 82 MG/DL — SIGNIFICANT CHANGE UP (ref 70–99)
GLUCOSE BLDC GLUCOMTR-MCNC: 99 MG/DL — SIGNIFICANT CHANGE UP (ref 70–99)
GLUCOSE BLDC GLUCOMTR-MCNC: 99 MG/DL — SIGNIFICANT CHANGE UP (ref 70–99)
GLUCOSE SERPL-MCNC: 150 MG/DL — HIGH (ref 70–99)
GLUCOSE SERPL-MCNC: 150 MG/DL — HIGH (ref 70–99)
HCT VFR BLD CALC: 34.8 % — LOW (ref 39–50)
HCT VFR BLD CALC: 34.8 % — LOW (ref 39–50)
HGB BLD-MCNC: 11.7 G/DL — LOW (ref 13–17)
HGB BLD-MCNC: 11.7 G/DL — LOW (ref 13–17)
MAGNESIUM SERPL-MCNC: 1.8 MG/DL — SIGNIFICANT CHANGE UP (ref 1.6–2.6)
MAGNESIUM SERPL-MCNC: 1.8 MG/DL — SIGNIFICANT CHANGE UP (ref 1.6–2.6)
MCHC RBC-ENTMCNC: 29.7 PG — SIGNIFICANT CHANGE UP (ref 27–34)
MCHC RBC-ENTMCNC: 29.7 PG — SIGNIFICANT CHANGE UP (ref 27–34)
MCHC RBC-ENTMCNC: 33.6 GM/DL — SIGNIFICANT CHANGE UP (ref 32–36)
MCHC RBC-ENTMCNC: 33.6 GM/DL — SIGNIFICANT CHANGE UP (ref 32–36)
MCV RBC AUTO: 88.3 FL — SIGNIFICANT CHANGE UP (ref 80–100)
MCV RBC AUTO: 88.3 FL — SIGNIFICANT CHANGE UP (ref 80–100)
NRBC # BLD: 0 /100 WBCS — SIGNIFICANT CHANGE UP (ref 0–0)
NRBC # BLD: 0 /100 WBCS — SIGNIFICANT CHANGE UP (ref 0–0)
PHOSPHATE SERPL-MCNC: 3.6 MG/DL — SIGNIFICANT CHANGE UP (ref 2.5–4.5)
PHOSPHATE SERPL-MCNC: 3.6 MG/DL — SIGNIFICANT CHANGE UP (ref 2.5–4.5)
PLATELET # BLD AUTO: 231 K/UL — SIGNIFICANT CHANGE UP (ref 150–400)
PLATELET # BLD AUTO: 231 K/UL — SIGNIFICANT CHANGE UP (ref 150–400)
POTASSIUM SERPL-MCNC: 4.7 MMOL/L — SIGNIFICANT CHANGE UP (ref 3.5–5.3)
POTASSIUM SERPL-MCNC: 4.7 MMOL/L — SIGNIFICANT CHANGE UP (ref 3.5–5.3)
POTASSIUM SERPL-SCNC: 4.7 MMOL/L — SIGNIFICANT CHANGE UP (ref 3.5–5.3)
POTASSIUM SERPL-SCNC: 4.7 MMOL/L — SIGNIFICANT CHANGE UP (ref 3.5–5.3)
RAPID RVP RESULT: SIGNIFICANT CHANGE UP
RAPID RVP RESULT: SIGNIFICANT CHANGE UP
RBC # BLD: 3.94 M/UL — LOW (ref 4.2–5.8)
RBC # BLD: 3.94 M/UL — LOW (ref 4.2–5.8)
RBC # FLD: 15.3 % — HIGH (ref 10.3–14.5)
RBC # FLD: 15.3 % — HIGH (ref 10.3–14.5)
SARS-COV-2 RNA SPEC QL NAA+PROBE: SIGNIFICANT CHANGE UP
SARS-COV-2 RNA SPEC QL NAA+PROBE: SIGNIFICANT CHANGE UP
SODIUM SERPL-SCNC: 131 MMOL/L — LOW (ref 135–145)
SODIUM SERPL-SCNC: 131 MMOL/L — LOW (ref 135–145)
SPECIMEN SOURCE: SIGNIFICANT CHANGE UP
SPECIMEN SOURCE: SIGNIFICANT CHANGE UP
TACROLIMUS SERPL-MCNC: 13.8 NG/ML — SIGNIFICANT CHANGE UP
TACROLIMUS SERPL-MCNC: 13.8 NG/ML — SIGNIFICANT CHANGE UP
WBC # BLD: 16.2 K/UL — HIGH (ref 3.8–10.5)
WBC # BLD: 16.2 K/UL — HIGH (ref 3.8–10.5)
WBC # FLD AUTO: 16.2 K/UL — HIGH (ref 3.8–10.5)
WBC # FLD AUTO: 16.2 K/UL — HIGH (ref 3.8–10.5)

## 2024-01-03 PROCEDURE — 99232 SBSQ HOSP IP/OBS MODERATE 35: CPT

## 2024-01-03 PROCEDURE — 99233 SBSQ HOSP IP/OBS HIGH 50: CPT

## 2024-01-03 RX ORDER — INSULIN LISPRO 100/ML
VIAL (ML) SUBCUTANEOUS
Refills: 0 | Status: DISCONTINUED | OUTPATIENT
Start: 2024-01-03 | End: 2024-01-09

## 2024-01-03 RX ORDER — INSULIN HUMAN 100 [IU]/ML
3 INJECTION, SOLUTION SUBCUTANEOUS
Qty: 100 | Refills: 0 | Status: DISCONTINUED | OUTPATIENT
Start: 2024-01-03 | End: 2024-01-09

## 2024-01-03 RX ORDER — PANTOPRAZOLE SODIUM 20 MG/1
40 TABLET, DELAYED RELEASE ORAL EVERY 24 HOURS
Refills: 0 | Status: DISCONTINUED | OUTPATIENT
Start: 2024-01-04 | End: 2024-01-09

## 2024-01-03 RX ORDER — INSULIN LISPRO 100/ML
17 VIAL (ML) SUBCUTANEOUS
Refills: 0 | Status: DISCONTINUED | OUTPATIENT
Start: 2024-01-03 | End: 2024-01-04

## 2024-01-03 RX ORDER — MAGNESIUM SULFATE 500 MG/ML
2 VIAL (ML) INJECTION ONCE
Refills: 0 | Status: COMPLETED | OUTPATIENT
Start: 2024-01-03 | End: 2024-01-03

## 2024-01-03 RX ORDER — INSULIN GLARGINE 100 [IU]/ML
30 INJECTION, SOLUTION SUBCUTANEOUS
Refills: 0 | Status: DISCONTINUED | OUTPATIENT
Start: 2024-01-03 | End: 2024-01-04

## 2024-01-03 RX ORDER — TACROLIMUS 5 MG/1
5 CAPSULE ORAL
Refills: 0 | Status: DISCONTINUED | OUTPATIENT
Start: 2024-01-03 | End: 2024-01-04

## 2024-01-03 RX ADMIN — Medication 650 MILLIGRAM(S): at 11:38

## 2024-01-03 RX ADMIN — Medication 100 MILLIGRAM(S): at 13:17

## 2024-01-03 RX ADMIN — NALOXEGOL OXALATE 25 MILLIGRAM(S): 12.5 TABLET, FILM COATED ORAL at 11:16

## 2024-01-03 RX ADMIN — Medication 650 MILLIGRAM(S): at 18:10

## 2024-01-03 RX ADMIN — POLYETHYLENE GLYCOL 3350 17 GRAM(S): 17 POWDER, FOR SOLUTION ORAL at 11:02

## 2024-01-03 RX ADMIN — Medication 17 UNIT(S): at 18:24

## 2024-01-03 RX ADMIN — Medication 50 MILLIGRAM(S): at 21:29

## 2024-01-03 RX ADMIN — Medication 50 MILLIGRAM(S): at 06:21

## 2024-01-03 RX ADMIN — TACROLIMUS 6 MILLIGRAM(S): 5 CAPSULE ORAL at 07:42

## 2024-01-03 RX ADMIN — BUMETANIDE 1 MILLIGRAM(S): 0.25 INJECTION INTRAMUSCULAR; INTRAVENOUS at 11:03

## 2024-01-03 RX ADMIN — CHLORHEXIDINE GLUCONATE 1 APPLICATION(S): 213 SOLUTION TOPICAL at 11:03

## 2024-01-03 RX ADMIN — Medication 650 MILLIGRAM(S): at 17:47

## 2024-01-03 RX ADMIN — Medication 4: at 11:27

## 2024-01-03 RX ADMIN — MAGNESIUM OXIDE 400 MG ORAL TABLET 400 MILLIGRAM(S): 241.3 TABLET ORAL at 11:39

## 2024-01-03 RX ADMIN — Medication 650 MILLIGRAM(S): at 11:02

## 2024-01-03 RX ADMIN — Medication 17 UNIT(S): at 11:27

## 2024-01-03 RX ADMIN — PANTOPRAZOLE SODIUM 40 MILLIGRAM(S): 20 TABLET, DELAYED RELEASE ORAL at 11:02

## 2024-01-03 RX ADMIN — Medication 10 MILLIGRAM(S): at 13:18

## 2024-01-03 RX ADMIN — Medication 14 UNIT(S): at 07:45

## 2024-01-03 RX ADMIN — Medication 60 MILLIGRAM(S): at 06:20

## 2024-01-03 RX ADMIN — Medication 100 MILLIGRAM(S): at 21:28

## 2024-01-03 RX ADMIN — MYCOPHENOLATE MOFETIL 1000 MILLIGRAM(S): 250 CAPSULE ORAL at 19:45

## 2024-01-03 RX ADMIN — Medication 25 GRAM(S): at 10:48

## 2024-01-03 RX ADMIN — Medication 15 MILLIGRAM(S): at 19:45

## 2024-01-03 RX ADMIN — BUDESONIDE AND FORMOTEROL FUMARATE DIHYDRATE 1 PUFF(S): 160; 4.5 AEROSOL RESPIRATORY (INHALATION) at 07:41

## 2024-01-03 RX ADMIN — VALGANCICLOVIR 450 MILLIGRAM(S): 450 TABLET, FILM COATED ORAL at 07:42

## 2024-01-03 RX ADMIN — BUDESONIDE AND FORMOTEROL FUMARATE DIHYDRATE 1 PUFF(S): 160; 4.5 AEROSOL RESPIRATORY (INHALATION) at 17:46

## 2024-01-03 RX ADMIN — Medication 60 MILLIGRAM(S): at 17:46

## 2024-01-03 RX ADMIN — Medication 1 TABLET(S): at 11:16

## 2024-01-03 RX ADMIN — MYCOPHENOLATE MOFETIL 1000 MILLIGRAM(S): 250 CAPSULE ORAL at 07:42

## 2024-01-03 RX ADMIN — OLANZAPINE 5 MILLIGRAM(S): 15 TABLET, FILM COATED ORAL at 21:28

## 2024-01-03 RX ADMIN — Medication 5 MILLIGRAM(S): at 21:29

## 2024-01-03 RX ADMIN — Medication 50 MILLIGRAM(S): at 13:18

## 2024-01-03 RX ADMIN — TACROLIMUS 5 MILLIGRAM(S): 5 CAPSULE ORAL at 19:44

## 2024-01-03 RX ADMIN — INSULIN GLARGINE 30 UNIT(S): 100 INJECTION, SOLUTION SUBCUTANEOUS at 11:26

## 2024-01-03 RX ADMIN — FLUCONAZOLE 100 MILLIGRAM(S): 150 TABLET ORAL at 11:03

## 2024-01-03 RX ADMIN — BUMETANIDE 1 MILLIGRAM(S): 0.25 INJECTION INTRAMUSCULAR; INTRAVENOUS at 17:47

## 2024-01-03 RX ADMIN — VALGANCICLOVIR 450 MILLIGRAM(S): 450 TABLET, FILM COATED ORAL at 19:45

## 2024-01-03 RX ADMIN — Medication 650 MILLIGRAM(S): at 06:20

## 2024-01-03 RX ADMIN — BUMETANIDE 1 MILLIGRAM(S): 0.25 INJECTION INTRAMUSCULAR; INTRAVENOUS at 06:21

## 2024-01-03 RX ADMIN — Medication 12 MILLIGRAM(S): at 06:21

## 2024-01-03 RX ADMIN — Medication 2: at 07:44

## 2024-01-03 RX ADMIN — Medication 10 MILLIGRAM(S): at 21:29

## 2024-01-03 RX ADMIN — MAGNESIUM OXIDE 400 MG ORAL TABLET 400 MILLIGRAM(S): 241.3 TABLET ORAL at 17:46

## 2024-01-03 RX ADMIN — MAGNESIUM OXIDE 400 MG ORAL TABLET 400 MILLIGRAM(S): 241.3 TABLET ORAL at 07:42

## 2024-01-03 RX ADMIN — Medication 100 MILLIGRAM(S): at 06:20

## 2024-01-03 RX ADMIN — Medication 10 MILLIGRAM(S): at 06:21

## 2024-01-03 RX ADMIN — Medication 650 MILLIGRAM(S): at 06:40

## 2024-01-03 NOTE — BH CONSULTATION LIAISON PROGRESS NOTE - NSBHASSESSMENTFT_PSY_ALL_CORE
Mr. Hardy is a 58 year-old man with no formal prior psychiatric h/o and past medical history of HTN, CAD (1 stent in 2009), ICH (2008) presented on 11/1 with abnormal EKG. Patient admitted to [facility] for NSTEMI, s/p intubation and catheterization. Patient is now s/p OHT on 12/25.  Psychiatry follow up was requested as the primary team was concerned about some behavioral/affective disturbances.      Patient seen at the bedside. Calm, controlled, no signs of acute agitation. Reported worsening of anxiety, with insomnia and irritability. Denied any perceptual disturbances, denied SI/HI/I/P. 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   Patient currently in agreement to start low dose neuroleptic to prevent exacerbation of affective symptoms    Plan:  Routine observation  START Zyprexa 2.5 mg PO QHS, will titrate further as tolerated. Monitor QTc and VS  Can use Atarax 10 mg PO TID PRN for anxiety; if patient amenable to increase in dose consider Atarax 25 mg PO BID PRN anxiety  Continue to f/u with Psychology team  Rest of the medical workup as per primary team Mr. Hardy is a 58 year-old man with no formal prior psychiatric h/o and past medical history of HTN, CAD (1 stent in 2009), ICH (2008) presented on 11/1 with abnormal EKG. Patient admitted to [facility] for NSTEMI, s/p intubation and catheterization. Patient is now s/p OHT on 12/25.  Psychiatry follow up was requested as the primary team was concerned about some behavioral/affective disturbances.      Patient seen at the bedside. Calm, controlled, no signs of acute agitation. Reported worsening of anxiety, with insomnia and irritability. Denied any perceptual disturbances, denied SI/HI/I/P. 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   Patient currently in agreement to start low dose neuroleptic to prevent exacerbation of affective symptoms    Plan:  Routine observation  START Zyprexa 2.5 mg PO QHS, will titrate further as tolerated. Monitor QTc and VS  Can use Atarax 10 mg PO TID PRN for anxiety; if patient amenable to increase in dose consider Atarax 25 mg PO BID PRN anxiety  Continue to f/u with Psychology team  Rest of the medical workup as per primary team Mr. Hardy is a 58 year-old man with no formal prior psychiatric h/o and past medical history of HTN, CAD (1 stent in 2009), ICH (2008) presented on 11/1 with abnormal EKG. Patient admitted to [facility] for NSTEMI, s/p intubation and catheterization. Patient is now s/p OHT on 12/25.  Psychiatry follow up was requested as the primary team was concerned about some behavioral/affective disturbances.      Patient seen at the bedside. Calm, controlled, no signs of acute agitation. Reported worsening of anxiety, with insomnia and irritability. Denied any perceptual disturbances, denied SI/HI/I/P. 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   Patient currently in agreement to start low dose neuroleptic to prevent exacerbation of affective symptoms    Plan:  Routine observation  START Zyprexa 2.5 mg PO QHS, will titrate further as tolerated. Monitor QTc ( ensure < 500) and VS  Can use Atarax 10 mg PO TID PRN for anxiety; if patient amenable to increase in dose consider Atarax 25 mg PO BID PRN anxiety  Continue to f/u with Psychology team  Rest of the medical workup as per primary team Mr. Hardy is a 58 year-old man with no formal prior psychiatric h/o and past medical history of HTN, CAD (1 stent in 2009), ICH (2008) presented on 11/1 with abnormal EKG. Patient admitted to [facility] for NSTEMI, s/p intubation and catheterization. Patient is now s/p OHT on 12/25.  Psychiatry follow up was requested as the primary team was concerned about some behavioral/affective disturbances.      Patient seen at the bedside. Calm, controlled, no signs of acute agitation. Reported worsening of anxiety, with insomnia and irritability. Denied any perceptual disturbances, denied SI/HI/I/P. 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   Patient currently in agreement to start low dose neuroleptic to prevent exacerbation of affective symptoms    Plan:  Routine observation  START Zyprexa 2.5 mg PO QHS, will titrate further as tolerated. Monitor QTc ( ensure < 500) and VS  Can use Atarax 10 mg PO TID PRN for anxiety; if patient amenable to increase in dose consider Atarax 25 mg PO BID PRN anxiety  Continue to f/u with Psychology team  Rest of the medical workup as per primary team

## 2024-01-03 NOTE — PROGRESS NOTE ADULT - SUBJECTIVE AND OBJECTIVE BOX
LD VALENCIA  58y Male  MRN:[id number]    Patient is a 58y old  Male who presents with a chief complaint of NSTEMI (01 Nov 2023 20:29)    HPI:  57yo M w/ hx HTN, CAD w/ 1 stent in 2009, ICH (2008) presenting with abn ekg. Patient presented to [facility] where he was found to have STEMI, recommended to get cath however patient did not want to get it there so it left and came here.  Patient initially had cough, congestion, fever, was placed on antibiotics on Sunday.  Started feeling nauseous and had a presyncopal event after which he presented to ED last night.  Had chest pain as well.  Chest pain is midsternal.  Not currently having chest pain.  Received 4 aspirin 30 min pta. (01 Nov 2023 15:11)      Patient seen and evaluated at bedside. interval events noted    Interval HPI:   no acute events o/n     PAST MEDICAL & SURGICAL HISTORY:  HTN (hypertension)      CAD (coronary artery disease)  2009; stent      Intracranial hemorrhage  2008      Respiratory arrest  december 1st      Myocardial infarction, unspecified MI type, unspecified artery      History of coronary artery stent placement          REVIEW OF SYSTEMS:  as per hpi     VITALS:      Vital Signs Last 24 Hrs  T(C): 36.3 (03 Jan 2024 11:19), Max: 37 (03 Jan 2024 04:00)  T(F): 97.4 (03 Jan 2024 11:19), Max: 98.6 (03 Jan 2024 04:00)  HR: 122 (03 Jan 2024 13:17) (97 - 122)  BP: 104/72 (03 Jan 2024 13:17) (97/66 - 127/74)  BP(mean): 84 (03 Jan 2024 13:17) (77 - 93)  RR: 18 (03 Jan 2024 13:05) (18 - 18)  SpO2: 99% (03 Jan 2024 13:17) (96% - 100%)    Parameters below as of 03 Jan 2024 13:17  Patient On (Oxygen Delivery Method): room air          PHYSICAL EXAM:  GENERAL: NAD, comfortable.    HEAD:  Atraumatic, Normocephalic  EYES: EOMI, PERRLA, conjunctiva and sclera clear  NECK:  No JVD.    CHEST/LUNG: Clear to auscultation bilaterally; No wheeze +chest tube  HEART: Regular rate and rhythm; No murmurs, rubs, or gallops  ABDOMEN: Soft, Nontender, Nondistended; Bowel sounds present  EXTREMITIES:  2+ Peripheral Pulses, No clubbing, cyanosis, or edema  NEUROLOGY: a0x3          Consultant(s) Notes Reviewed:  [x ] YES  [ ] NO  Care Discussed with Consultants/Other Providers [ x] YES  [ ] NO    MEDS:   MEDICATIONS  (STANDING):  acetaminophen     Tablet .. 650 milliGRAM(s) Oral every 6 hours  budesonide  80 MICROgram(s)/formoterol 4.5 MICROgram(s) Inhaler 1 Puff(s) Inhalation two times a day  buMETAnide Injectable 1 milliGRAM(s) IV Push <User Schedule>  chlorhexidine 4% Liquid 1 Application(s) Topical <User Schedule>  dextrose 5%. 1000 milliLiter(s) (100 mL/Hr) IV Continuous <Continuous>  dextrose 5%. 1000 milliLiter(s) (50 mL/Hr) IV Continuous <Continuous>  dextrose 50% Injectable 50 milliLiter(s) IV Push every 15 minutes  fluconAZOLE   Tablet 100 milliGRAM(s) Oral <User Schedule>  glucagon  Injectable 1 milliGRAM(s) IntraMuscular once  hydrALAZINE 100 milliGRAM(s) Oral every 8 hours  insulin glargine Injectable (LANTUS) 30 Unit(s) SubCutaneous <User Schedule>  insulin lispro (ADMELOG) corrective regimen sliding scale   SubCutaneous at bedtime  insulin lispro (ADMELOG) corrective regimen sliding scale   SubCutaneous three times a day before meals  insulin lispro Injectable (ADMELOG) 17 Unit(s) SubCutaneous three times a day before meals  insulin regular Infusion 3 Unit(s)/Hr (3 mL/Hr) IV Continuous <Continuous>  lidocaine   4% Patch 3 Patch Transdermal daily  magnesium oxide 400 milliGRAM(s) Oral three times a day with meals  melatonin 5 milliGRAM(s) Oral at bedtime  metoclopramide Injectable 10 milliGRAM(s) IV Push every 8 hours  mycophenolate mofetil 1000 milliGRAM(s) Oral every 12 hours  naloxegol 25 milliGRAM(s) Oral daily  NIFEdipine XL 60 milliGRAM(s) Oral every 12 hours  OLANZapine 5 milliGRAM(s) Oral at bedtime  polyethylene glycol 3350 17 Gram(s) Oral daily  predniSONE   Tablet 15 milliGRAM(s) Oral every 12 hours  pregabalin 50 milliGRAM(s) Oral every 8 hours  senna 2 Tablet(s) Oral at bedtime  tacrolimus 6 milliGRAM(s) Oral <User Schedule>  trimethoprim   80 mG/sulfamethoxazole 400 mG 1 Tablet(s) Oral daily  valGANciclovir 450 milliGRAM(s) Oral every 12 hours    MEDICATIONS  (PRN):  dextrose Oral Gel 15 Gram(s) Oral once PRN Blood Glucose LESS THAN 70 milliGRAM(s)/deciliter        Allergies    penicillins (Unknown)    Intolerances        LABS:                                                         11.7   16.20 )-----------( 231      ( 03 Jan 2024 07:18 )             34.8   01-03    131<L>  |  93<L>  |  55<H>  ----------------------------<  150<H>  4.7   |  21<L>  |  1.26    Ca    9.9      03 Jan 2024 07:16  Phos  3.6     01-03  Mg     1.8     01-03    TPro  6.9  /  Alb  4.4  /  TBili  0.6  /  DBili  x   /  AST  31  /  ALT  79<H>  /  AlkPhos  54  01-02      < from: CT Abdomen and Pelvis No Cont (11.28.23 @ 03:38) >  IMPRESSION:  Mildly dilated colon and prominent but not overly dilated small bowel   with air-fluid levels. No discrete transition point. Findings are   suggestive of ileus.    Intra-aortic balloon pump with the inferior marker at the level of the   inferior mesenteric artery and the balloon overlying the origins of the   renal arteries, celiac artery, and superior mesenteric artery. Consider   repositioning. Concern for IABP positioning was discussed with LANETTE Hawthorne   on 11/28/2023 at 3:42 AM by Dr. Shepard.    < end of copied text >          < from: Colonoscopy (11.17.23 @ 10:35) >                                                                                                        Impression:          - The entire examined colon is normal on direct and retroflexion views.                       - No specimens collected.  Recommendation:      - Resume previous diet today.                       - No large polyps or masses detected, No objection from GI standpoint to                 proceed with heart transplantation/advanced heart therapies.                       - Please call back should any further questions or concerns arise.    < end of copied text >     < from: Xray Chest 1 View- PORTABLE-Urgent (11.01.23 @ 07:42) >    IMPRESSION:  Clear lungs.    ---End of Report ---        < end of copied text >  < from: TTE W or WO Ultrasound Enhancing Agent (11.01.23 @ 10:23) >  _____________________________     CONCLUSIONS:      1. Left ventricular cavity is moderately dilated. Left ventricular wall thickness is normal. Left ventricular systolic function is severely decreased with an ejection fraction of 32 % by Chinchilla's method of disks. Regional wall motion abnormalities present.   2. Multiple segmental abnormalities exist. See findings.   3. There is moderate (grade 2) left ventricular diastolic dysfunction, with indeterminant filling pressure.   4. Normal right ventricular cavity size, wall thickness, and systolic function.   5. No significant valvular disease.   6. No pericardial effusion seen.   7. Compared to the transthoracic echocardiogram performed on 1/25/2017 the areas of akinesis are unchanged but there has been a decline in LV systolic function with new areas of hypokinesis.    __________________________________________________________________    < end of copied text >  < from: TTE Limited W or WO Ultrasound Enhancing Agent (11.02.23 @ 07:41) >  __________________________     CONCLUSIONS:      1. After obtaining consent, Definity ultrasound enhancing agent was given for enhanced left ventricular opacification and improved delineation of the left ventricular endocardial borders. Left ventricular systolic function is severely decreased with a calculated ejection fraction of 22 % by the Chinchilla's biplane method of disks. There is a left ventricular thrombus.   2. Findings were discussed with Litzy BOSS on 11/2/2023 at 8.49am.   3. There is a left ventricular thrombus.    _________________________________________________________________    < end of copied text >   LD VALENCIA  58y Male  MRN:[id number]    Patient is a 58y old  Male who presents with a chief complaint of NSTEMI (01 Nov 2023 20:29)    HPI:  59yo M w/ hx HTN, CAD w/ 1 stent in 2009, ICH (2008) presenting with abn ekg. Patient presented to [facility] where he was found to have STEMI, recommended to get cath however patient did not want to get it there so it left and came here.  Patient initially had cough, congestion, fever, was placed on antibiotics on Sunday.  Started feeling nauseous and had a presyncopal event after which he presented to ED last night.  Had chest pain as well.  Chest pain is midsternal.  Not currently having chest pain.  Received 4 aspirin 30 min pta. (01 Nov 2023 15:11)      Patient seen and evaluated at bedside. interval events noted    Interval HPI:   no acute events o/n     PAST MEDICAL & SURGICAL HISTORY:  HTN (hypertension)      CAD (coronary artery disease)  2009; stent      Intracranial hemorrhage  2008      Respiratory arrest  december 1st      Myocardial infarction, unspecified MI type, unspecified artery      History of coronary artery stent placement          REVIEW OF SYSTEMS:  as per hpi     VITALS:      Vital Signs Last 24 Hrs  T(C): 36.3 (03 Jan 2024 11:19), Max: 37 (03 Jan 2024 04:00)  T(F): 97.4 (03 Jan 2024 11:19), Max: 98.6 (03 Jan 2024 04:00)  HR: 122 (03 Jan 2024 13:17) (97 - 122)  BP: 104/72 (03 Jan 2024 13:17) (97/66 - 127/74)  BP(mean): 84 (03 Jan 2024 13:17) (77 - 93)  RR: 18 (03 Jan 2024 13:05) (18 - 18)  SpO2: 99% (03 Jan 2024 13:17) (96% - 100%)    Parameters below as of 03 Jan 2024 13:17  Patient On (Oxygen Delivery Method): room air          PHYSICAL EXAM:  GENERAL: NAD, comfortable.    HEAD:  Atraumatic, Normocephalic  EYES: EOMI, PERRLA, conjunctiva and sclera clear  NECK:  No JVD.    CHEST/LUNG: Clear to auscultation bilaterally; No wheeze +chest tube  HEART: Regular rate and rhythm; No murmurs, rubs, or gallops  ABDOMEN: Soft, Nontender, Nondistended; Bowel sounds present  EXTREMITIES:  2+ Peripheral Pulses, No clubbing, cyanosis, or edema  NEUROLOGY: a0x3          Consultant(s) Notes Reviewed:  [x ] YES  [ ] NO  Care Discussed with Consultants/Other Providers [ x] YES  [ ] NO    MEDS:   MEDICATIONS  (STANDING):  acetaminophen     Tablet .. 650 milliGRAM(s) Oral every 6 hours  budesonide  80 MICROgram(s)/formoterol 4.5 MICROgram(s) Inhaler 1 Puff(s) Inhalation two times a day  buMETAnide Injectable 1 milliGRAM(s) IV Push <User Schedule>  chlorhexidine 4% Liquid 1 Application(s) Topical <User Schedule>  dextrose 5%. 1000 milliLiter(s) (100 mL/Hr) IV Continuous <Continuous>  dextrose 5%. 1000 milliLiter(s) (50 mL/Hr) IV Continuous <Continuous>  dextrose 50% Injectable 50 milliLiter(s) IV Push every 15 minutes  fluconAZOLE   Tablet 100 milliGRAM(s) Oral <User Schedule>  glucagon  Injectable 1 milliGRAM(s) IntraMuscular once  hydrALAZINE 100 milliGRAM(s) Oral every 8 hours  insulin glargine Injectable (LANTUS) 30 Unit(s) SubCutaneous <User Schedule>  insulin lispro (ADMELOG) corrective regimen sliding scale   SubCutaneous at bedtime  insulin lispro (ADMELOG) corrective regimen sliding scale   SubCutaneous three times a day before meals  insulin lispro Injectable (ADMELOG) 17 Unit(s) SubCutaneous three times a day before meals  insulin regular Infusion 3 Unit(s)/Hr (3 mL/Hr) IV Continuous <Continuous>  lidocaine   4% Patch 3 Patch Transdermal daily  magnesium oxide 400 milliGRAM(s) Oral three times a day with meals  melatonin 5 milliGRAM(s) Oral at bedtime  metoclopramide Injectable 10 milliGRAM(s) IV Push every 8 hours  mycophenolate mofetil 1000 milliGRAM(s) Oral every 12 hours  naloxegol 25 milliGRAM(s) Oral daily  NIFEdipine XL 60 milliGRAM(s) Oral every 12 hours  OLANZapine 5 milliGRAM(s) Oral at bedtime  polyethylene glycol 3350 17 Gram(s) Oral daily  predniSONE   Tablet 15 milliGRAM(s) Oral every 12 hours  pregabalin 50 milliGRAM(s) Oral every 8 hours  senna 2 Tablet(s) Oral at bedtime  tacrolimus 6 milliGRAM(s) Oral <User Schedule>  trimethoprim   80 mG/sulfamethoxazole 400 mG 1 Tablet(s) Oral daily  valGANciclovir 450 milliGRAM(s) Oral every 12 hours    MEDICATIONS  (PRN):  dextrose Oral Gel 15 Gram(s) Oral once PRN Blood Glucose LESS THAN 70 milliGRAM(s)/deciliter        Allergies    penicillins (Unknown)    Intolerances        LABS:                                                         11.7   16.20 )-----------( 231      ( 03 Jan 2024 07:18 )             34.8   01-03    131<L>  |  93<L>  |  55<H>  ----------------------------<  150<H>  4.7   |  21<L>  |  1.26    Ca    9.9      03 Jan 2024 07:16  Phos  3.6     01-03  Mg     1.8     01-03    TPro  6.9  /  Alb  4.4  /  TBili  0.6  /  DBili  x   /  AST  31  /  ALT  79<H>  /  AlkPhos  54  01-02      < from: CT Abdomen and Pelvis No Cont (11.28.23 @ 03:38) >  IMPRESSION:  Mildly dilated colon and prominent but not overly dilated small bowel   with air-fluid levels. No discrete transition point. Findings are   suggestive of ileus.    Intra-aortic balloon pump with the inferior marker at the level of the   inferior mesenteric artery and the balloon overlying the origins of the   renal arteries, celiac artery, and superior mesenteric artery. Consider   repositioning. Concern for IABP positioning was discussed with LANETTE Hawthorne   on 11/28/2023 at 3:42 AM by Dr. Shepard.    < end of copied text >          < from: Colonoscopy (11.17.23 @ 10:35) >                                                                                                        Impression:          - The entire examined colon is normal on direct and retroflexion views.                       - No specimens collected.  Recommendation:      - Resume previous diet today.                       - No large polyps or masses detected, No objection from GI standpoint to                 proceed with heart transplantation/advanced heart therapies.                       - Please call back should any further questions or concerns arise.    < end of copied text >     < from: Xray Chest 1 View- PORTABLE-Urgent (11.01.23 @ 07:42) >    IMPRESSION:  Clear lungs.    ---End of Report ---        < end of copied text >  < from: TTE W or WO Ultrasound Enhancing Agent (11.01.23 @ 10:23) >  _____________________________     CONCLUSIONS:      1. Left ventricular cavity is moderately dilated. Left ventricular wall thickness is normal. Left ventricular systolic function is severely decreased with an ejection fraction of 32 % by Chinchilla's method of disks. Regional wall motion abnormalities present.   2. Multiple segmental abnormalities exist. See findings.   3. There is moderate (grade 2) left ventricular diastolic dysfunction, with indeterminant filling pressure.   4. Normal right ventricular cavity size, wall thickness, and systolic function.   5. No significant valvular disease.   6. No pericardial effusion seen.   7. Compared to the transthoracic echocardiogram performed on 1/25/2017 the areas of akinesis are unchanged but there has been a decline in LV systolic function with new areas of hypokinesis.    __________________________________________________________________    < end of copied text >  < from: TTE Limited W or WO Ultrasound Enhancing Agent (11.02.23 @ 07:41) >  __________________________     CONCLUSIONS:      1. After obtaining consent, Definity ultrasound enhancing agent was given for enhanced left ventricular opacification and improved delineation of the left ventricular endocardial borders. Left ventricular systolic function is severely decreased with a calculated ejection fraction of 22 % by the Chinchilla's biplane method of disks. There is a left ventricular thrombus.   2. Findings were discussed with Litzy BOSS on 11/2/2023 at 8.49am.   3. There is a left ventricular thrombus.    _________________________________________________________________    < end of copied text >

## 2024-01-03 NOTE — PROGRESS NOTE ADULT - SUBJECTIVE AND OBJECTIVE BOX
VITAL SIGNS    Telemetry:  nsr     Vital Signs Last 24 Hrs  T(C): 36.3 (24 @ 07:27), Max: 37 (24 @ 04:00)  T(F): 97.4 (24 @ 07:27), Max: 98.6 (24 @ 04:00)  HR: 104 (24 @ 09:03) (96 - 109)  BP: 97/66 (24 @ 09:03) (97/66 - 127/74)  RR: 18 (24 @ 04:00) (16 - 18)  SpO2: 98% (24 @ 04:00) (94% - 98%)                    @ 07:01  -   @ 07:00  --------------------------------------------------------  IN: 1194 mL / OUT: 2600 mL / NET: -1406 mL     @ 07:01  -   @ 09:52  --------------------------------------------------------  IN: 300 mL / OUT: 275 mL / NET: 25 mL          Daily     Daily Weight in k.1 (2024 08:54)            CAPILLARY BLOOD GLUCOSE      POCT Blood Glucose.: 173 mg/dL (2024 07:28)  POCT Blood Glucose.: 129 mg/dL (2024 04:40)  POCT Blood Glucose.: 184 mg/dL (2024 04:37)  POCT Blood Glucose.: 99 mg/dL (2024 03:54)  POCT Blood Glucose.: 113 mg/dL (2024 02:57)  POCT Blood Glucose.: 148 mg/dL (2024 02:03)  POCT Blood Glucose.: 175 mg/dL (2024 00:58)  POCT Blood Glucose.: 218 mg/dL (2024 23:53)  POCT Blood Glucose.: 281 mg/dL (2024 22:58)  POCT Blood Glucose.: 393 mg/dL (2024 21:40)  POCT Blood Glucose.: 267 mg/dL (2024 16:43)  POCT Blood Glucose.: 269 mg/dL (2024 11:18)            Drains:     MS         [x  ] Drainage:                    Pacing Wires           Coumadin    [ ] YES          [ x ]      NO                                   PHYSICAL EXAM        Neurology: alert and oriented x 3, nonfocal, no gross deficits  CV : s1 s2 RRR  Sternal Wound :  CDI , Stable  Lungs: cta  Abdomen: soft, nontender, nondistended, positive bowel sounds, last bowel movement                       chest tubes x 1  :    voiding         Extremities:      -edema   /  -   calve tenderness ,            acetaminophen     Tablet .. 650 milliGRAM(s) Oral every 6 hours  budesonide  80 MICROgram(s)/formoterol 4.5 MICROgram(s) Inhaler 1 Puff(s) Inhalation two times a day  buMETAnide Injectable 1 milliGRAM(s) IV Push <User Schedule>  chlorhexidine 4% Liquid 1 Application(s) Topical <User Schedule>  dextrose 5%. 1000 milliLiter(s) IV Continuous <Continuous>  dextrose 5%. 1000 milliLiter(s) IV Continuous <Continuous>  dextrose 50% Injectable 50 milliLiter(s) IV Push every 15 minutes  dextrose Oral Gel 15 Gram(s) Oral once PRN  fluconAZOLE   Tablet 100 milliGRAM(s) Oral <User Schedule>  glucagon  Injectable 1 milliGRAM(s) IntraMuscular once  hydrALAZINE 100 milliGRAM(s) Oral every 8 hours  insulin glargine Injectable (LANTUS) 25 Unit(s) SubCutaneous <User Schedule>  insulin lispro (ADMELOG) corrective regimen sliding scale   SubCutaneous three times a day before meals  insulin lispro (ADMELOG) corrective regimen sliding scale   SubCutaneous at bedtime  insulin lispro Injectable (ADMELOG) 17 Unit(s) SubCutaneous three times a day before meals  insulin regular Infusion 3 Unit(s)/Hr IV Continuous <Continuous>  lidocaine   4% Patch 3 Patch Transdermal daily  magnesium oxide 400 milliGRAM(s) Oral three times a day with meals  melatonin 5 milliGRAM(s) Oral at bedtime  methylPREDNISolone sodium succinate Injectable 12 milliGRAM(s) IV Push every 12 hours  methylPREDNISolone sodium succinate Injectable   IV Push   metoclopramide Injectable 10 milliGRAM(s) IV Push every 8 hours  mycophenolate mofetil 1000 milliGRAM(s) Oral every 12 hours  naloxegol 25 milliGRAM(s) Oral daily  NIFEdipine XL 60 milliGRAM(s) Oral every 12 hours  OLANZapine 5 milliGRAM(s) Oral at bedtime  pantoprazole  Injectable 40 milliGRAM(s) IV Push daily  polyethylene glycol 3350 17 Gram(s) Oral daily  pregabalin 50 milliGRAM(s) Oral every 8 hours  senna 2 Tablet(s) Oral at bedtime  tacrolimus 6 milliGRAM(s) Oral <User Schedule>  trimethoprim   80 mG/sulfamethoxazole 400 mG 1 Tablet(s) Oral daily  valGANciclovir 450 milliGRAM(s) Oral every 12 hours                    Physical Therapy Rec:   Home  [  ]   Home w/ PT  [  ]  Rehab  [  ]  Discussed with Cardiothoracic Team at AM rounds.

## 2024-01-03 NOTE — BH CONSULTATION LIAISON PROGRESS NOTE - NSBHATTESTCOMMENTATTENDFT_PSY_A_CORE
Patient seen with fellow, chart/labs reviewed and case discussed with the team.  Patient exhibited acute departure from baseline over last two days with worsening irritability, sleep architecture and exacerbation in anxiety; likely an adverse reaction to continued use of corticosteroids. Patient calm and cooperative this AM. 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 Risks, benefits and adverse effects discussed, patient verbalized understanding and voiced agreement with above plan. Monitor EKG to ensure Qtc < 500 for continued use of Zyprexa.

## 2024-01-03 NOTE — PROGRESS NOTE ADULT - ASSESSMENT
58M : HFrEF (LVIDd 6.4 cm, LVEF 32%), PCI ('08), HTN, DMT2 (A1c 8.3%) and CVA s/p TPA ('18), recently treated for PNA who initially presented to [facility] via EMS after syncope reportedly requiring defibrillation. Treated for ACS but left AMA to come to [facility]. On arrival ECG with ST depression in lateral leads. Intubated 11/1 for respiratory failure and was found to have COVID. L/RHC 11/1revealing  of LAD and RCA, elevated filling pressures and CI of 1.5 prompting placement of IABP. Extubated 11/3. IABP was weaned to off 11/7, however the following day developed PMVT cardiac arrest with prompt CPR and defibrillation, started on IV Lidocaine/Amio and IABP ultimately replaced on 11/9. During this admission was found to be bacteremic for which she was treated for Staph epi, Enterococcus faecalis, Cutibacterium with IV abx.  Was listed Status 2, ABO A, PRA 0% (12/12).     A suitable donor was identified and on 12/25, he underwent OHT (ischemic Time: 253min, CMV -/+, Toxo -/-). The following day, extubated and IABP removed. Milrinone was being gradually weaned but when turned off yesterday, despite adequate perfusion indicies, had rise in lactate for which inotropic support was resumed. Currently appears well supported and compensated. Will again trial wean off inotrope. Will also adjust oral antihypertensive regimen with goal to wean off Cardene gtt. Plan for 1st EMBX next week on 1/2.     On 10/25/23, pt underwent OHT  - IABP removed 12/26  - Sabrina and Epi weaned off 12/26  - Milrinone off  - Procardia XL 60 mg BID  - Bumex TID  - 1st RHC/EMBx on Tuesday 1/2  - Transfer to Stepdown 12/31  -  Right femerol rangel in place   - Med CT x 3 - LWS      insulin gtt d/t hyperglycemia - house endo following   - d/c planning - aniticipate home   1/1/24 - VSS - pt. c/o uncomfortable bed last evening - stating he would sign out AMA if he was not given a more comfortable bed.  staff obliged & a new bed was brought in.  pt requesting new urinal with each void as he "is at risk for an infection"  Ptt properly educated on post transplant risks.  states, "A doctor told me I can only use a urinal once to prevent infection"  transplant team to reinforce post-transplant precautions. MED CT x 3 in place  ?d/c femjuarez multani today - will rounds with transplant team  1/2/24 VSS - NPO for cardiac bx -rhc this am-  pt refused blood draw this am despite provider discussing the importance of blood levels post transplant.  Pt acquiesced - allowed blood draw @ 7:30a-d/c planning -   1/3 s/p biopsy and rhc yesterday  Ms ct x 1 remains in place.   \Glucose improved, currentyly off insulin infusion   58M : HFrEF (LVIDd 6.4 cm, LVEF 32%), PCI ('08), HTN, DMT2 (A1c 8.3%) and CVA s/p TPA ('18), recently treated for PNA who initially presented to [facility] via EMS after syncope reportedly requiring defibrillation. Treated for ACS but left AMA to come to [facility]. On arrival ECG with ST depression in lateral leads. Intubated 11/1 for respiratory failure and was found to have COVID. L/RHC 11/1revealing  of LAD and RCA, elevated filling pressures and CI of 1.5 prompting placement of IABP. Extubated 11/3. IABP was weaned to off 11/7, however the following day developed PMVT cardiac arrest with prompt CPR and defibrillation, started on IV Lidocaine/Amio and IABP ultimately replaced on 11/9. During this admission was found to be bacteremic for which she was treated for Staph epi, Enterococcus faecalis, Cutibacterium with IV abx.  Was listed Status 2, ABO A, PRA 0% (12/12).     A suitable donor was identified and on 12/25, he underwent OHT (ischemic Time: 253min, CMV -/+, Toxo -/-). The following day, extubated and IABP removed. Milrinone was being gradually weaned but when turned off yesterday, despite adequate perfusion indicies, had rise in lactate for which inotropic support was resumed. Currently appears well supported and compensated. Will again trial wean off inotrope. Will also adjust oral antihypertensive regimen with goal to wean off Cardene gtt. Plan for 1st EMBX next week on 1/2.     On 10/25/23, pt underwent OHT  - IABP removed 12/26  - Sabrina and Epi weaned off 12/26  - Milrinone off  - Procardia XL 60 mg BID  - Bumex TID  - 1st RHC/EMBx on Tuesday 1/2  - Transfer to Stepdown 12/31  -  Right femerol rangel in place   - Med CT x 3 - LWS      insulin gtt d/t hyperglycemia - house endo following   - d/c planning - aniticipate home   1/1/24 - VSS - pt. c/o uncomfortable bed last evening - stating he would sign out AMA if he was not given a more comfortable bed.  staff obliged & a new bed was brought in.  pt requesting new urinal with each void as he "is at risk for an infection"  Ptt properly educated on post transplant risks.  states, "A doctor told me I can only use a urinal once to prevent infection"  transplant team to reinforce post-transplant precautions. MED CT x 3 in place  ?d/c femjuarez multani today - will rounds with transplant team  1/2/24 VSS - NPO for cardiac bx -rhc this am-  pt refused blood draw this am despite provider discussing the importance of blood levels post transplant.  Pt acquiesced - allowed blood draw @ 7:30a-d/c planning -   1/3 s/p biopsy and rhc yesterday  Ms ct x 1 remains in place.   \Glucose improved, currentyly off insulin infusion

## 2024-01-03 NOTE — PROGRESS NOTE ADULT - NS ATTEND AMEND GEN_ALL_CORE FT
57 y/o M admitted with cardiogenic shock, now s/p OHT on 12/25/2023 (CMV -/+, Toxo -/-). RHC/EMB 1/2/2024 with elevated filling pressures, then received hypertonic saline. Continue Bumex 1mg TID. Continue Hydralazine and Nifedipine for BP control.  Continue immunosuppression with Tacrolimus, CellCept 1000mg BID, Prednisone taper. Continue prophylaxis with Bactrim, Valcyte, Fluconazole.  Cultures and RVP to evaluate leukocytosis.  Ongoing transplant education.

## 2024-01-03 NOTE — PROGRESS NOTE ADULT - SUBJECTIVE AND OBJECTIVE BOX
Seen earlier today     Chief Complaint: Diabetes Mellitus follow up    INTERVAL HX: Noted he was on insulin gtt overnight 10 pm - 3am, requiring 3-5 units. Fasting  this am and prelunch . Tolerating POs, eats full meals. Eats from home as well. Consistent carbohydrate diet discussed and advised to avoid snacks between meals to evaluate insulin requirement appropriately.       Review of Systems:  General: As above  GI: No nausea, vomiting  Endocrine: no  S&Sx of hypoglycemia    Allergies    penicillins (Unknown)    Intolerances      MEDICATIONS  (STANDING):  acetaminophen     Tablet .. 650 milliGRAM(s) Oral every 6 hours  budesonide  80 MICROgram(s)/formoterol 4.5 MICROgram(s) Inhaler 1 Puff(s) Inhalation two times a day  buMETAnide Injectable 1 milliGRAM(s) IV Push <User Schedule>  chlorhexidine 4% Liquid 1 Application(s) Topical <User Schedule>  dextrose 5%. 1000 milliLiter(s) (50 mL/Hr) IV Continuous <Continuous>  dextrose 5%. 1000 milliLiter(s) (100 mL/Hr) IV Continuous <Continuous>  dextrose 50% Injectable 50 milliLiter(s) IV Push every 15 minutes  fluconAZOLE   Tablet 100 milliGRAM(s) Oral <User Schedule>  glucagon  Injectable 1 milliGRAM(s) IntraMuscular once  hydrALAZINE 100 milliGRAM(s) Oral every 8 hours  insulin glargine Injectable (LANTUS) 30 Unit(s) SubCutaneous <User Schedule>  insulin lispro (ADMELOG) corrective regimen sliding scale   SubCutaneous at bedtime  insulin lispro (ADMELOG) corrective regimen sliding scale   SubCutaneous three times a day before meals  insulin lispro Injectable (ADMELOG) 17 Unit(s) SubCutaneous three times a day before meals  insulin regular Infusion 3 Unit(s)/Hr (3 mL/Hr) IV Continuous <Continuous>  lidocaine   4% Patch 3 Patch Transdermal daily  magnesium oxide 400 milliGRAM(s) Oral three times a day with meals  melatonin 5 milliGRAM(s) Oral at bedtime  methylPREDNISolone sodium succinate Injectable   IV Push   methylPREDNISolone sodium succinate Injectable 12 milliGRAM(s) IV Push every 12 hours  metoclopramide Injectable 10 milliGRAM(s) IV Push every 8 hours  mycophenolate mofetil 1000 milliGRAM(s) Oral every 12 hours  naloxegol 25 milliGRAM(s) Oral daily  NIFEdipine XL 60 milliGRAM(s) Oral every 12 hours  OLANZapine 5 milliGRAM(s) Oral at bedtime  pantoprazole  Injectable 40 milliGRAM(s) IV Push daily  polyethylene glycol 3350 17 Gram(s) Oral daily  pregabalin 50 milliGRAM(s) Oral every 8 hours  senna 2 Tablet(s) Oral at bedtime  tacrolimus 6 milliGRAM(s) Oral <User Schedule>  trimethoprim   80 mG/sulfamethoxazole 400 mG 1 Tablet(s) Oral daily  valGANciclovir 450 milliGRAM(s) Oral every 12 hours        insulin glargine Injectable (LANTUS)   5 Unit(s) SubCutaneous (01-02-24 @ 13:23)    insulin glargine Injectable (LANTUS)   30 Unit(s) SubCutaneous (01-03-24 @ 11:26)    insulin lispro (ADMELOG) corrective regimen sliding scale   4 Unit(s) SubCutaneous (01-03-24 @ 11:27)   2 Unit(s) SubCutaneous (01-03-24 @ 07:44)   6 Unit(s) SubCutaneous (01-02-24 @ 17:21)    insulin lispro Injectable (ADMELOG)   14 Unit(s) SubCutaneous (01-03-24 @ 07:45)   14 Unit(s) SubCutaneous (01-02-24 @ 17:21)    insulin lispro Injectable (ADMELOG)   17 Unit(s) SubCutaneous (01-03-24 @ 11:27)    methylPREDNISolone sodium succinate Injectable   12 milliGRAM(s) IV Push (01-03-24 @ 06:21)   12 milliGRAM(s) IV Push (01-02-24 @ 17:22)        PHYSICAL EXAM:  VITALS: T(C): 36.3 (01-03-24 @ 11:19)  T(F): 97.4 (01-03-24 @ 11:19), Max: 98.6 (01-03-24 @ 04:00)  HR: 97 (01-03-24 @ 11:19) (97 - 109)  BP: 106/71 (01-03-24 @ 11:19) (97/66 - 127/74)  RR:  (18 - 18)  SpO2:  (96% - 98%)  Wt(kg): --  GENERAL: Male laying in bed, in NAD  Respiratory: Respirations unlabored   Extremities: Warm, no edema  NEURO: Alert , appropriate     LABS:  POCT Blood Glucose.: 204 mg/dL (01-03-24 @ 11:25)  POCT Blood Glucose.: 173 mg/dL (01-03-24 @ 07:28)  POCT Blood Glucose.: 129 mg/dL (01-03-24 @ 04:40)  POCT Blood Glucose.: 184 mg/dL (01-03-24 @ 04:37)  POCT Blood Glucose.: 99 mg/dL (01-03-24 @ 03:54)  POCT Blood Glucose.: 113 mg/dL (01-03-24 @ 02:57)  POCT Blood Glucose.: 148 mg/dL (01-03-24 @ 02:03)  POCT Blood Glucose.: 175 mg/dL (01-03-24 @ 00:58)  POCT Blood Glucose.: 218 mg/dL (01-02-24 @ 23:53)  POCT Blood Glucose.: 281 mg/dL (01-02-24 @ 22:58)  POCT Blood Glucose.: 393 mg/dL (01-02-24 @ 21:40)  POCT Blood Glucose.: 267 mg/dL (01-02-24 @ 16:43)  POCT Blood Glucose.: 269 mg/dL (01-02-24 @ 11:18)  POCT Blood Glucose.: 205 mg/dL (01-02-24 @ 08:12)  POCT Blood Glucose.: 144 mg/dL (01-02-24 @ 04:16)  POCT Blood Glucose.: 110 mg/dL (01-02-24 @ 02:49)  POCT Blood Glucose.: 93 mg/dL (01-02-24 @ 02:10)  POCT Blood Glucose.: 116 mg/dL (01-02-24 @ 01:17)  POCT Blood Glucose.: 174 mg/dL (01-01-24 @ 23:54)  POCT Blood Glucose.: 174 mg/dL (01-01-24 @ 22:29)  POCT Blood Glucose.: 131 mg/dL (01-01-24 @ 21:42)  POCT Blood Glucose.: 99 mg/dL (01-01-24 @ 21:07)  POCT Blood Glucose.: 182 mg/dL (01-01-24 @ 19:35)  POCT Blood Glucose.: 155 mg/dL (01-01-24 @ 18:33)  POCT Blood Glucose.: 176 mg/dL (01-01-24 @ 17:31)  POCT Blood Glucose.: 229 mg/dL (01-01-24 @ 16:33)  POCT Blood Glucose.: 308 mg/dL (01-01-24 @ 15:33)  POCT Blood Glucose.: 360 mg/dL (01-01-24 @ 14:35)  POCT Blood Glucose.: 570 mg/dL (01-01-24 @ 13:15)  POCT Blood Glucose.: 534 mg/dL (01-01-24 @ 13:13)  POCT Blood Glucose.: 185 mg/dL (01-01-24 @ 08:10)  POCT Blood Glucose.: 122 mg/dL (01-01-24 @ 06:18)  POCT Blood Glucose.: 92 mg/dL (01-01-24 @ 05:22)  POCT Blood Glucose.: 89 mg/dL (01-01-24 @ 04:35)  POCT Blood Glucose.: 120 mg/dL (01-01-24 @ 03:13)  POCT Blood Glucose.: 141 mg/dL (01-01-24 @ 02:11)  POCT Blood Glucose.: 166 mg/dL (01-01-24 @ 01:20)  POCT Blood Glucose.: 193 mg/dL (01-01-24 @ 00:23)  POCT Blood Glucose.: 249 mg/dL (12-31-23 @ 22:52)  POCT Blood Glucose.: 203 mg/dL (12-31-23 @ 21:31)  POCT Blood Glucose.: 103 mg/dL (12-31-23 @ 20:28)  POCT Blood Glucose.: 67 mg/dL (12-31-23 @ 20:04)  POCT Blood Glucose.: 104 mg/dL (12-31-23 @ 19:04)  POCT Blood Glucose.: 104 mg/dL (12-31-23 @ 18:06)  POCT Blood Glucose.: 129 mg/dL (12-31-23 @ 17:12)  POCT Blood Glucose.: 202 mg/dL (12-31-23 @ 16:08)  POCT Blood Glucose.: 201 mg/dL (12-31-23 @ 14:58)  POCT Blood Glucose.: 158 mg/dL (12-31-23 @ 13:42)                          11.7   16.20 )-----------( 231      ( 03 Jan 2024 07:18 )             34.8     01-03    131<L>  |  93<L>  |  55<H>  ----------------------------<  150<H>  4.7   |  21<L>  |  1.26    Ca    9.9      03 Jan 2024 07:16  Phos  3.6     01-03  Mg     1.8     01-03    TPro  6.9  /  Alb  4.4  /  TBili  0.6  /  DBili  x   /  AST  31  /  ALT  79<H>  /  AlkPhos  54  01-02    eGFR: 66 mL/min/1.73m2 (03 Jan 2024 07:16)    11-10 Chol 130 Direct LDL -- LDL calculated 75 HDL 37<L> Trig 95, 11-03 Chol 198 Direct LDL -- LDL calculated 114<H> HDL 24<L> Trig 344<H>  Thyroid Function Tests:      A1C with Estimated Average Glucose Result: 6.1 % (01-02-24 @ 07:26)  A1C with Estimated Average Glucose Result: 8.3 % (11-01-23 @ 06:14)    Estimated Average Glucose: 128 mg/dL (01-02-24 @ 07:26)  Estimated Average Glucose: 192 mg/dL (11-01-23 @ 06:14)        Diet, Consistent Carbohydrate/No Snacks (12-27-23 @ 11:00) [Active]

## 2024-01-03 NOTE — PROGRESS NOTE ADULT - ASSESSMENT
59 yo M with PMHx of HTN, CAD w/ 1 stent in 2009, ICH (2008) presented on 11/1 with abn ekg. Patient presented to [facility] where he was found to have STEMI, recommended to get cath however patient did not want to get it there so he left and came here.   EKG here with ST depression in lateral leads and elevation in anterior leads   Prior to C found to be tachycardic, dyspneic, intubated   [facility] 11/1 with chronic total occlusion of LAD and RCA, with elevated RA and PA pressures  TTE 11/1 with severely decreased EF 32%, s/p IABP 11/1        #s/p OHT 12/25/23 CMV D-/R+; Toxo D-/R-  Induction simulect and MPN  Maintenance tacro/MMF  ·  Plan: - CMV -/+: intermediate risk.  Will need to start on valganciclovir when able X 6 months.    - Toxo -/-: none required  - PJP ppx: will introduce eventually   - Trush ppx: eventual Nystatin.  - donor HCV TIM( -) but HCV antibody +.  - on daptomycin ppx for hx of rash to vancomycin  - donor sputum + for staph but with would not influence risk of infection in heart recipient    #pretransplant E faecalis bacteremia in c/o colonoscopy  completed therapy    #Rash- to vancomycin?  resolved    # hep B and Hep A not immune  received vaccine series but second dose given early  will need repeat ab titers    # Bacteremia.   ·  Plan: BCx from 11/17 with GPC in pair/chains in both bottles; Mixed cultures Staph epi and Enterococcus faecalis . Repeat cultures from 11/18; NGTD  + rods in blood culture on 11/30 (reported on 12/4= cutibacterium), repeat BCxs Negative Completed abx course.   receiving Daptomycin perioperative prophylaxis      Recommendations  ·	Completed perioperative ppx cefepime and daptomycin for 48 hours (the latter already discontinued)  ·	Donor with sputum staphylococcus- in the absence of donor bacteremia would not affect heart recipient outcomes and would not need further prophylaxis for this  ·	Valcyte and bactrim ppx  started        Chao Peters MD  Can be called via Teams  After 5pm/weekends [phone]     59 yo M with PMHx of HTN, CAD w/ 1 stent in 2009, ICH (2008) presented on 11/1 with abn ekg. Patient presented to [facility] where he was found to have STEMI, recommended to get cath however patient did not want to get it there so he left and came here.   EKG here with ST depression in lateral leads and elevation in anterior leads   Prior to C found to be tachycardic, dyspneic, intubated   [facility] 11/1 with chronic total occlusion of LAD and RCA, with elevated RA and PA pressures  TTE 11/1 with severely decreased EF 32%, s/p IABP 11/1        #s/p OHT 12/25/23 CMV D-/R+; Toxo D-/R-  Induction simulect and MPN  Maintenance tacro/MMF  ·  Plan: - CMV -/+: intermediate risk.  Will need to start on valganciclovir when able X 6 months.    - Toxo -/-: none required  - PJP ppx: will introduce eventually   - Trush ppx: eventual Nystatin.  - donor HCV TIM( -) but HCV antibody +.  - on daptomycin ppx for hx of rash to vancomycin  - donor sputum + for staph but with would not influence risk of infection in heart recipient    #pretransplant E faecalis bacteremia in c/o colonoscopy  completed therapy    #Rash- to vancomycin?  resolved    # hep B and Hep A not immune  received vaccine series but second dose given early  will need repeat ab titers    # Bacteremia.   ·  Plan: BCx from 11/17 with GPC in pair/chains in both bottles; Mixed cultures Staph epi and Enterococcus faecalis . Repeat cultures from 11/18; NGTD  + rods in blood culture on 11/30 (reported on 12/4= cutibacterium), repeat BCxs Negative Completed abx course.   receiving Daptomycin perioperative prophylaxis      Recommendations  ·	Completed perioperative ppx cefepime and daptomycin for 48 hours (the latter already discontinued)  ·	Donor with sputum staphylococcus- in the absence of donor bacteremia would not affect heart recipient outcomes and would not need further prophylaxis for this  ·	Valcyte and bactrim ppx  started        Chao Peters MD  Can be called via Teams  After 5pm/weekends [phone]

## 2024-01-03 NOTE — PROGRESS NOTE ADULT - ASSESSMENT
59 yo M with HFrEF (LVIDd 6.4 cm, LVEF 32%), CAD s/p PCI (2008), HTN, DMT2 (A1c 8.3%) and CVA s/p TPA (2018), recently treated for PNA who initially presented to [facility] via EMS after syncope reportedly requiring defibrillation. Treated for ACS but left AMA to come to [facility]. On arrival ECG with ST depression in lateral leads. Intubated 11/1 for respiratory failure and was found to have COVID. L/RHC 11/1revealing  of LAD and RCA, elevated filling pressures and CI of 1.5 prompting placement of IABP. Extubated 11/3. IABP was weaned to off 11/7, however the following day developed PMVT cardiac arrest with prompt CPR and defibrillation, started on IV Lidocaine/Amio and IABP ultimately replaced on 11/9. During this admission was found to be bacteremic for which she was treated for Staph epi, Enterococcus faecalis, Cutibacterium with IV abx.  Was listed Status 2, ABO A, PRA 0% (12/12).     A suitable donor was identified and on 12/25, he underwent OHT (ischemic Time: 253min, CMV -/+, Toxo -/-). The following day, extubated and IABP removed. Overall progressing well. He underwent RHC/EMBx with revealed elevated filling pressures and normal cardiac output. Will increase diuretics and hopeful for discharge end of week vs early next week.   57 yo M with HFrEF (LVIDd 6.4 cm, LVEF 32%), CAD s/p PCI (2008), HTN, DMT2 (A1c 8.3%) and CVA s/p TPA (2018), recently treated for PNA who initially presented to [facility] via EMS after syncope reportedly requiring defibrillation. Treated for ACS but left AMA to come to [facility]. On arrival ECG with ST depression in lateral leads. Intubated 11/1 for respiratory failure and was found to have COVID. L/RHC 11/1revealing  of LAD and RCA, elevated filling pressures and CI of 1.5 prompting placement of IABP. Extubated 11/3. IABP was weaned to off 11/7, however the following day developed PMVT cardiac arrest with prompt CPR and defibrillation, started on IV Lidocaine/Amio and IABP ultimately replaced on 11/9. During this admission was found to be bacteremic for which she was treated for Staph epi, Enterococcus faecalis, Cutibacterium with IV abx.  Was listed Status 2, ABO A, PRA 0% (12/12).     A suitable donor was identified and on 12/25, he underwent OHT (ischemic Time: 253min, CMV -/+, Toxo -/-). The following day, extubated and IABP removed. Overall progressing well. He underwent RHC/EMBx with revealed elevated filling pressures and normal cardiac output. Will increase diuretics and hopeful for discharge end of week vs early next week.   57 yo M with HFrEF (LVIDd 6.4 cm, LVEF 32%), CAD s/p PCI (2008), HTN, DMT2 (A1c 8.3%) and CVA s/p TPA (2018), recently treated for PNA who initially presented to [facility] via EMS after syncope reportedly requiring defibrillation. Treated for ACS but left AMA to come to [facility]. On arrival ECG with ST depression in lateral leads. Intubated 11/1 for respiratory failure and was found to have COVID. L/RHC 11/1revealing  of LAD and RCA, elevated filling pressures and CI of 1.5 prompting placement of IABP. Extubated 11/3. IABP was weaned to off 11/7, however the following day developed PMVT cardiac arrest with prompt CPR and defibrillation, started on IV Lidocaine/Amio and IABP ultimately replaced on 11/9. During this admission was found to be bacteremic for which she was treated for Staph epi, Enterococcus faecalis, Cutibacterium with IV abx.  Was listed Status 2, ABO A, PRA 0% (12/12).     A suitable donor was identified and on 12/25, he underwent OHT (ischemic Time: 253min, CMV -/+, Toxo -/-). The following day, extubated and IABP removed. Overall progressing well. He underwent RHC/EMBx with revealed elevated filling pressures and normal cardiac output. His diuretics have been increased and he is responding well. He was noted to have bump in leukocytosis today however remains afebrile and off antibiotics. Will tentatively plan for discharge end of week.   57 yo M with HFrEF (LVIDd 6.4 cm, LVEF 32%), CAD s/p PCI (2008), HTN, DMT2 (A1c 8.3%) and CVA s/p TPA (2018), recently treated for PNA who initially presented to [facility] via EMS after syncope reportedly requiring defibrillation. Treated for ACS but left AMA to come to [facility]. On arrival ECG with ST depression in lateral leads. Intubated 11/1 for respiratory failure and was found to have COVID. L/RHC 11/1revealing  of LAD and RCA, elevated filling pressures and CI of 1.5 prompting placement of IABP. Extubated 11/3. IABP was weaned to off 11/7, however the following day developed PMVT cardiac arrest with prompt CPR and defibrillation, started on IV Lidocaine/Amio and IABP ultimately replaced on 11/9. During this admission was found to be bacteremic for which she was treated for Staph epi, Enterococcus faecalis, Cutibacterium with IV abx.  Was listed Status 2, ABO A, PRA 0% (12/12).     A suitable donor was identified and on 12/25, he underwent OHT (ischemic Time: 253min, CMV -/+, Toxo -/-). The following day, extubated and IABP removed. Overall progressing well. He underwent RHC/EMBx with revealed elevated filling pressures and normal cardiac output. His diuretics have been increased and he is responding well. He was noted to have bump in leukocytosis today however remains afebrile and off antibiotics. Will tentatively plan for discharge end of week.

## 2024-01-03 NOTE — PROGRESS NOTE ADULT - PROBLEM SELECTOR PLAN 1
- s/p OHT on 12/25. Ischemic Time: 253min  - IABP removed 12/26  - continue with Procardia XL 60 mg BID and hydralazine 100 mg PO TID  - continue with Bumex 1 mg IV TID and will be given hypertonic saline today   - underwent RHC/EMBx today, awaiting results   - Please keep primary Dr. Banuelos [phone] in the loop per family request. - s/p OHT on 12/25. Ischemic Time: 253min  - IABP removed 12/26  - continue with Procardia XL 60 mg BID and hydralazine 100 mg PO TID  - continue with Bumex 1 mg IV TID and will be given hypertonic saline today   - underwent RHC/EMBx today, awaiting results   - Please keep primary Dr. Banuelos [phone] in the loop per family request. - s/p OHT on 12/25. Ischemic Time: 253min  - IABP removed 12/26  - continue with Procardia XL 60 mg BID and hydralazine 100 mg PO TID  - continue with Bumex 1 mg IV TID. S/p hypertonic saline on 1/2  - underwent RHC/EMBx on 1/2, Grade 0R  - Please keep primary Dr. Banuelos [phone] in the loop per family request. - s/p OHT on 12/25. Ischemic Time: 253min  - IABP removed 12/26  - continue with Procardia XL 60 mg BID and hydralazine 100 mg PO TID  - continue with Bumex 1 mg IV TID. S/p hypertonic saline on 1/2  - underwent RHC/EMBx on 1/2, Grade 0R  - Please keep primary Dr. Banuelos [phone] in the loop per family request.

## 2024-01-03 NOTE — PROGRESS NOTE ADULT - ASSESSMENT
59yo M w/ PMHx HTN, CAD s/p stent (2009), ICH (2008), ICM now s/p heart transplant on 12/25/2023. Endocrine consulted for T2DM management.  Tolerating POs with good oral intake, eats foods from home as well. pt was educated on consistent carbohydrate diet and advised to limit snacks between meals.  On Methylprednisolone taper as below. Fasting  and prelunch .     Steroid schedule  12/28 Methylprednisolone 32 units BID   12/29 Methylprednisolone 28 units BID  12/30 Methylprednisolone 24 units BID   12/31 Methylprednisolone 20 units BID   1/1 Methylprednisolone 16 units BID   1/2 Methylprednisolone 12 units BID X 14 days     #T2DM (A1c  8.3%), uncontrolled with complications   #Steroid induced hyperglycemia   Home meds: confirmed with patient on 12/28 patient takes dapagliflozin 10mg daily, patient is NOT taking ozempic any longer (has not taken in over 4 months)    His insulin requirement on insulin gtt variable 0-5 units, total requirement last 24 hours 50 units and received Lantus 20 units around noon yesterday    Recommendations:   - Test BGs ACTID, at HS and 2 am   - Increase Lantus to 30 units at HS  - Increase premeal Admelog 17 units before each measl ( Hold if NPO)   - Continue Moderate correction scale ACTID and sperate Moderate correction scale at HS and 2 am   - recommend Nutrition consult   - Please  keep hypoglycemia protocol in place   - Carb consistent diet   - please inform endocrine if any changes to steroid taper as this will impact insulin requirements   - BG goal 100 to 180mg/dl   - DISCHARGE RECOMMENDATIONS: depending on steroid regimen on discharge and insulin requirements basal-bolus regimen, TBD.  - OUT-PATIENT FOLLOW UP: Patient should follow up with PCP vs Endocrinology depending on insulin requirement (pt does not have endocrinolgist)    #HLD  - patient not on statin  - goal LDL in diabetes mellitus is <70  - outpatient fasting lipid profile     #HTN  - patient on losartan 25mg daily at home  - goal BP in diabetes mellitus is <130/80  - defer to primary team for management    Jessi Jorgensen NP-C   Contact via [employer] Teams during business hours  To reach covering provider access AMION via [employer]  For Urgent matters/after-hours/weekends/holidays please page endocrine fellow on call   For nonurgent matters please email [email or web address]    Please note that this patient may be followed by different provider tomorrow.  Notify endocrine 24 hours prior to discharge for final recommendations 59yo M w/ PMHx HTN, CAD s/p stent (2009), ICH (2008), ICM now s/p heart transplant on 12/25/2023. Endocrine consulted for T2DM management.  Tolerating POs with good oral intake, eats foods from home as well. pt was educated on consistent carbohydrate diet and advised to limit snacks between meals.  On Methylprednisolone taper as below. Fasting  and prelunch .     Steroid schedule  12/28 Methylprednisolone 32 units BID   12/29 Methylprednisolone 28 units BID  12/30 Methylprednisolone 24 units BID   12/31 Methylprednisolone 20 units BID   1/1 Methylprednisolone 16 units BID   1/2 Methylprednisolone 12 units BID X 14 days     #T2DM (A1c  8.3%), uncontrolled with complications   #Steroid induced hyperglycemia   Home meds: confirmed with patient on 12/28 patient takes dapagliflozin 10mg daily, patient is NOT taking ozempic any longer (has not taken in over 4 months)    His insulin requirement on insulin gtt variable 0-5 units, total requirement last 24 hours 50 units and received Lantus 20 units around noon yesterday    Recommendations:   - Test BGs ACTID, at HS and 2 am   - Increase Lantus to 30 units at HS  - Increase premeal Admelog 17 units before each measl ( Hold if NPO)   - Continue Moderate correction scale ACTID and sperate Moderate correction scale at HS and 2 am   - recommend Nutrition consult   - Please  keep hypoglycemia protocol in place   - Carb consistent diet   - please inform endocrine if any changes to steroid taper as this will impact insulin requirements   - BG goal 100 to 180mg/dl   - DISCHARGE RECOMMENDATIONS: depending on steroid regimen on discharge and insulin requirements basal-bolus regimen, TBD.  - OUT-PATIENT FOLLOW UP: Patient should follow up with PCP vs Endocrinology depending on insulin requirement (pt does not have endocrinolgist)    #HLD  - patient not on statin  - goal LDL in diabetes mellitus is <70  - outpatient fasting lipid profile     #HTN  - patient on losartan 25mg daily at home  - goal BP in diabetes mellitus is <130/80  - defer to primary team for management    Jessi Jorgensen NP-C   Contact via Microsoft Teams during business hours  To reach covering provider access AMION via [employer]  For Urgent matters/after-hours/weekends/holidays please page endocrine fellow on call   For nonurgent matters please email [email or web address]    Please note that this patient may be followed by different provider tomorrow.  Notify endocrine 24 hours prior to discharge for final recommendations

## 2024-01-03 NOTE — PROGRESS NOTE ADULT - ASSESSMENT
57 yo male h/o htn, cad s/p pci, ICH, here with NSTEMI  s/p intubation and cath. now in CCU    NSTEMI  s/p  cath  cath results noted. multi vessel dz., CTSx f/u.    pt with vtach/fib arrest again on 11/8. s/p ACLS and ROSC.   in ccu with iabp   plan for transplant as per HF and transplant team  transplant w/u ongoing.   s/p colonoscopy 11/17. normal  now listed for transplant as of 11/22 12/25: pt s/p heart transplant last night. remains intubated with iabp in CTU.   12/26: pt extubated to high flow this am. IABP currently being removed. pt a0x3.   12/27: pt feels well. walked with PT this am. currently comforable sitting in chair. IABP removed. wean off pressors as able.   12/28-29: no acute events o/n. weaning down ionotropes/pressors as able. immunosuppressives as per transplant team. PT  12/30: feels well. pressors/ionotropes weaned off. removal of chest tubes as able. immunosupressives as per id. PT.  12/31: feels well. tx out of ctu to step down unit. cont care as per ctsx team. PT  1/1: no acute events o/n. to transition off insulin gtt today. PT. d/w family bedside  1/2: no acute events o/n. plan on removal of a line and chest tubes today as per ctsx. dm mngt as per endo  1/3: feels well. s/p cardiac biopsy yesterday. worked with PT today. one CT remaining.       mngt as per CTSx team          Advanced care planning was discussed with patient and family.  Advanced care planning forms were reviewed and discussed as appropriate.  Differential diagnosis and plan of care discussed with patient after the evaluation.   Pain assessed and judicious use of narcotics when appropriate was discussed.  Importance of Fall prevention discussed.  Counseling on Smoking and Alcohol cessation was offered when appropriate.  Counseling on Diet, exercise, and medication compliance was done.       Approx 75 minutes spent. 59 yo male h/o htn, cad s/p pci, ICH, here with NSTEMI  s/p intubation and cath. now in CCU    NSTEMI  s/p  cath  cath results noted. multi vessel dz., CTSx f/u.    pt with vtach/fib arrest again on 11/8. s/p ACLS and ROSC.   in ccu with iabp   plan for transplant as per HF and transplant team  transplant w/u ongoing.   s/p colonoscopy 11/17. normal  now listed for transplant as of 11/22 12/25: pt s/p heart transplant last night. remains intubated with iabp in CTU.   12/26: pt extubated to high flow this am. IABP currently being removed. pt a0x3.   12/27: pt feels well. walked with PT this am. currently comforable sitting in chair. IABP removed. wean off pressors as able.   12/28-29: no acute events o/n. weaning down ionotropes/pressors as able. immunosuppressives as per transplant team. PT  12/30: feels well. pressors/ionotropes weaned off. removal of chest tubes as able. immunosupressives as per id. PT.  12/31: feels well. tx out of ctu to step down unit. cont care as per ctsx team. PT  1/1: no acute events o/n. to transition off insulin gtt today. PT. d/w family bedside  1/2: no acute events o/n. plan on removal of a line and chest tubes today as per ctsx. dm mngt as per endo  1/3: feels well. s/p cardiac biopsy yesterday. worked with PT today. one CT remaining.       mngt as per CTSx team          Advanced care planning was discussed with patient and family.  Advanced care planning forms were reviewed and discussed as appropriate.  Differential diagnosis and plan of care discussed with patient after the evaluation.   Pain assessed and judicious use of narcotics when appropriate was discussed.  Importance of Fall prevention discussed.  Counseling on Smoking and Alcohol cessation was offered when appropriate.  Counseling on Diet, exercise, and medication compliance was done.       Approx 75 minutes spent.

## 2024-01-03 NOTE — BH CONSULTATION LIAISON PROGRESS NOTE - NSBHCONSULTFOLLOWAFTERCARE_PSY_A_CORE FT
Continue home meds Patient doesn't meet criteria for inpatient psychiatric hospitalization.  He can f/u with his PCP   Patient can f/u [facility], [address] [place], [facility], [address] Floor, [place] [id number]. [phone]   Patient doesn't meet criteria for inpatient psychiatric hospitalization.  He can f/u with his PCP   Patient can f/u [facility], [address] [place], [facility], First Floor, [place] [id number]. [phone]

## 2024-01-03 NOTE — BH CONSULTATION LIAISON PROGRESS NOTE - NSBHCHARTREVIEWINVESTIGATE_PSY_A_CORE FT
Systolic  mmHg    Diastolic BP 65 mmHg    Ventricular Rate 79 BPM    Atrial Rate 79 BPM    P-R Interval 198 ms    QRS Duration 118 ms    Q-T Interval 416 ms    QTC Calculation(Bazett) 477 ms    P Axis 34 degrees    R Axis 1 degrees    T Axis 205 degrees    Diagnosis Line NORMAL SINUS RHYTHM  LEFT VENTRICULAR HYPERTROPHY WITH QRS WIDENING AND REPOLARIZATION ABNORMALITY  ABNORMAL ECG  WHEN COMPARED WITH 22-DEC-2023  VPD no longer present  Confirmed by MD PAULINO, ERIC ([id number]) on 12/23/2023 5:08:18 PM     Systolic  mmHg    Diastolic BP 65 mmHg    Ventricular Rate 79 BPM    Atrial Rate 79 BPM    P-R Interval 198 ms    QRS Duration 118 ms    Q-T Interval 416 ms    QTC Calculation(Bazett) 477 ms    P Axis 34 degrees    R Axis 1 degrees    T Axis 205 degrees    Diagnosis Line NORMAL SINUS RHYTHM  LEFT VENTRICULAR HYPERTROPHY WITH QRS WIDENING AND REPOLARIZATION ABNORMALITY  ABNORMAL ECG  WHEN COMPARED WITH 22-DEC-2023  VPD no longer present  Confirmed by MD PAULINO, ERIC ([id number]) on 12/23/2023 5:08:18 PM

## 2024-01-03 NOTE — BH CONSULTATION LIAISON PROGRESS NOTE - NSBHFUPINTERVALHXFT_PSY_A_CORE
Mr. Hardy is a 58 year-old man with no prior psychiatric h/o and past medical history of HTN, CAD (1 stent in 2009), ICH (2008) presented on 11/1 with abnormal EKG. Patient admitted to [facility] for NSTEMI, s/p intubation and catheterization. Patient is now s/p OHT on 12/25.  Psychiatry follow up was requested as the primary team was concerned about some behavioral/affective disturbances.      Patient seen, A & O X 3, calm, pleasant on approach. He recognized the providers from previous evaluations. Reported that his baseline anxiety has been getting worse ever since he presented to the hospital. Also noted difficulty sleeping at night, irritability and the ongoing stressor of prolonged hospitalization. Also reported that he had issues with the nursing staff as they weren't receptive to the complaints, but feels "calm" now. Denied having any perceptual disturbances including AH, VH, paranoia. Denied SI/HI/I/P.  Psychoeducation was provided to the patient about the implications of steroid use on mood including anxiety, insomnia, irritability, even psychosis/ceasar. Explained that some of the recent emotional disturbances could be explained by his current steroid use. Patient was receptive to the information provided and agreed to start a low dose of Zyprexa at night, risks vs benefits explained.    Mr. Hardy is a 58 year-old man with no prior psychiatric h/o and past medical history of HTN, CAD (1 stent in 2009), ICH (2008) presented on 11/1 with abnormal EKG. Patient admitted to [facility] for NSTEMI, s/p intubation and catheterization. Patient is now s/p OHT on 12/25.  Psychiatry follow up was requested as the primary team was concerned about some behavioral/affective disturbances.      Patient seen, A & O X 3, calm, pleasant on approach. He recognized the providers from previous evaluations. Reported that his baseline anxiety has been getting worse ever since he presented to the hospital. Also noted difficulty sleeping at night, irritability and the ongoing stressor of prolonged hospitalization. Also reported that he had issues with the nursing staff as they weren't receptive to the complaints, but feels "calm" now. Denied having any perceptual disturbances including AH, VH, paranoia. Denied SI/HI/I/P.  Psychoeducation was provided to the patient about the implications of steroid use on mood including anxiety, insomnia, irritability, even psychosis/ceasar. Explained that some of the recent emotional disturbances could be explained by his current steroid use. Patient was receptive to the information provided and agreed to start a low dose of Zyprexa at night, risks vs benefits explained.

## 2024-01-03 NOTE — CHART NOTE - NSCHARTNOTEFT_GEN_A_CORE
Nutrition Follow Up Note  Patient seen for: s/p heart transplant.   Chart reviewed, events noted.    Source: [x] Patient- very sleepy during RD interview; limited      [x] Medical Record      [x] RN        [] Family at bedside        [] Other:  - If unable to interview patient: [] Trach/Vent/BiPAP  [] Disoriented/confused/inappropriate to interview    Diet, Consistent Carbohydrate/No Snacks (23 @ 11:00) [Active]    Is current diet order appropriate/adequate? [x] Yes    PO intake:   [x] >75%  Adequate    [] 50-75%  Fair       [] <50%  Poor   - Pt reports adequate PO intake at this time; RD observed pt consumed ~100% of lunch this afternoon    Nutrition-related concerns:  - s/p heart transplant . Ordered for steroid taper, tacrolimus, Cellcept  - status post cardiac biopsy   - Pt with variable blood sugars; hyperglycemic; Endocrine following; now off insulin gtt and ordered for 30 units lantus and ISS admelog    GI: Last BM: 1/1 x1 per flow sheets. Bowel Regimen? [x] Yes  [] No  -> Ordered for Reglan     Chest tube output:  M2: 40 mL (/3 thus far), 100 mL ()    Weights:   Daily Weight in k.1 (), Weight in k.6 (-), Weight in k.6 (-31), Weight in k.9 (12-30), Weight in k (12-30), Weight in k.2 (12-29), Weight in k.1 (-28)  Wt history: Pt reports UBW ~180-190 pounds. On/off diuresis. on IV bumex.  RD will continue to trend as new wts available/able.     Drug Dosing Weight  Height (cm): 175.3 (24 Dec 2023 15:23)  Weight (kg): 98.9 (24 Dec 2023 15:23)  BMI (kg/m2): 32.2 (24 Dec 2023 15:23)-> recalculated based on low on daily wt of 87.4 kG () (28.4 kG/m2).  Reported UBW: ~81-86 kG    MEDICATIONS  (STANDING):  acetaminophen     Tablet .. 650 milliGRAM(s) Oral every 6 hours  budesonide  80 MICROgram(s)/formoterol 4.5 MICROgram(s) Inhaler 1 Puff(s) Inhalation two times a day  buMETAnide Injectable 1 milliGRAM(s) IV Push <User Schedule>  chlorhexidine 4% Liquid 1 Application(s) Topical <User Schedule>  dextrose 5%. 1000 milliLiter(s) (100 mL/Hr) IV Continuous <Continuous>  dextrose 5%. 1000 milliLiter(s) (50 mL/Hr) IV Continuous <Continuous>  dextrose 50% Injectable 50 milliLiter(s) IV Push every 15 minutes  fluconAZOLE   Tablet 100 milliGRAM(s) Oral <User Schedule>  glucagon  Injectable 1 milliGRAM(s) IntraMuscular once  hydrALAZINE 100 milliGRAM(s) Oral every 8 hours  insulin glargine Injectable (LANTUS) 30 Unit(s) SubCutaneous <User Schedule>  insulin lispro (ADMELOG) corrective regimen sliding scale   SubCutaneous three times a day before meals  insulin lispro (ADMELOG) corrective regimen sliding scale   SubCutaneous at bedtime  insulin lispro Injectable (ADMELOG) 17 Unit(s) SubCutaneous three times a day before meals  insulin regular Infusion 3 Unit(s)/Hr (3 mL/Hr) IV Continuous <Continuous>  lidocaine   4% Patch 3 Patch Transdermal daily  magnesium oxide 400 milliGRAM(s) Oral three times a day with meals  melatonin 5 milliGRAM(s) Oral at bedtime  metoclopramide Injectable 10 milliGRAM(s) IV Push every 8 hours  mycophenolate mofetil 1000 milliGRAM(s) Oral every 12 hours  naloxegol 25 milliGRAM(s) Oral daily  NIFEdipine XL 60 milliGRAM(s) Oral every 12 hours  OLANZapine 5 milliGRAM(s) Oral at bedtime  polyethylene glycol 3350 17 Gram(s) Oral daily  predniSONE   Tablet 15 milliGRAM(s) Oral every 12 hours  pregabalin 50 milliGRAM(s) Oral every 8 hours  senna 2 Tablet(s) Oral at bedtime  tacrolimus 6 milliGRAM(s) Oral <User Schedule>  trimethoprim   80 mG/sulfamethoxazole 400 mG 1 Tablet(s) Oral daily  valGANciclovir 450 milliGRAM(s) Oral every 12 hours    Pertinent Labs: - @ 07:16: Na 131<L>, BUN 55<H>, Cr 1.26, <H>, K+ 4.7, Phos 3.6, Mg 1.8, Alk Phos --, ALT/SGPT --, AST/SGOT --, HbA1c --    A1C with Estimated Average Glucose Result: 6.1 % (24 @ 07:26)  A1C with Estimated Average Glucose Result: 8.3 % (23 @ 06:14)    Finger Sticks:  POCT Blood Glucose.: 204 mg/dL ( @ 11:25)  POCT Blood Glucose.: 173 mg/dL ( @ 07:28)  POCT Blood Glucose.: 129 mg/dL ( @ 04:40)  POCT Blood Glucose.: 184 mg/dL ( @ 04:37)  POCT Blood Glucose.: 99 mg/dL ( @ 03:54)  POCT Blood Glucose.: 113 mg/dL ( @ 02:57)  POCT Blood Glucose.: 148 mg/dL ( @ 02:03)  POCT Blood Glucose.: 175 mg/dL ( @ 00:58)  POCT Blood Glucose.: 218 mg/dL ( @ 23:53)  POCT Blood Glucose.: 281 mg/dL ( @ 22:58)  POCT Blood Glucose.: 393 mg/dL ( @ 21:40)  POCT Blood Glucose.: 267 mg/dL ( @ 16:43)    Skin per nursing documentation: No pressure injuries noted.  Edema per nursing documentation: 1+ B/L feet per flow sheets    Estimated Needs:   Based on low on daily wt of 87.4 kG (12-03) with consideration for increased nutrient needs:  Estimated Caloric Needs: (26-30 kcal/kg): 8867-9487 kcal  Estimated Protein Needs: (1.3-1.6 g/kg): 114-140 gm  Defer fluid needs to team.    Previous Nutrition Diagnosis: Increased nutrient needs  Nutrition Diagnosis is: [x] ongoing - being addressed with adequate oral intake    Previous Nutrition Diagnosis: Inadequate energy intake  Nutrition Diagnosis is: [x] ongoing; pt appetite has improved    Previous Nutrition Diagnosis: Food & Nutrition-Related Knowledge Deficit   Nutrition Diagnosis is: [x] ongoing - being addressed with ongoing diet education PRN    New Nutrition Diagnosis: [x] N/A    Nutrition Care Plan:  [x] In Progress  [] Achieved  [] Not applicable    Nutrition Interventions:     Education Provided:       [x] Yes:  RD reviewed food safety after solid organ transplant guidelines; including, storage/cooking temperatures, cross contamination, expiration dates, and avoiding buffets and avoid eating out x3-6 m onths. Discussed S&S of food borne illness.  Reviewed food and immunosuppressive drug interactions (prednisone, tacrolimus, and cellcept). Reviewed the importance of BG control while on prednisone. Reviewed importance of a low K+ diet and reviewed foods high in K+ to be mindful of. Reviewed importance of avoiding grapefruit, pomegranate, starfruit and sour oranges. Pt was receptive to information, however would benefit from education follow up as pt very tired at today's visit. Pt accepted written materials on discussed topics.   Written materials provided: USDA Food Safety Booklet, Potassium Content of Foods List, Heart Healthy Consistent Carbohydrate Medical Nutrition Therapy Handouts     Recommendations:  1. Continue consistent carbohydrate diet as tolerated   -> Fluid needs deferred to provider. Consistency deferred to SLP and provider.   2. RD remains available to provide ongoing nutrition education PRN   3. Honor pt food preferences to promote adequate oral intake while in-house     Monitoring and Evaluation:   Continue to monitor nutritional intake, tolerance to diet prescription, weights, labs, skin integrity    RD remains available upon request and will follow up per protocol  Sultana Velazquez, MS, RD, CDN, CNSC avail. on [employer] Teams Nutrition Follow Up Note  Patient seen for: s/p heart transplant.   Chart reviewed, events noted.    Source: [x] Patient- very sleepy during RD interview; limited      [x] Medical Record      [x] RN        [] Family at bedside        [] Other:  - If unable to interview patient: [] Trach/Vent/BiPAP  [] Disoriented/confused/inappropriate to interview    Diet, Consistent Carbohydrate/No Snacks (23 @ 11:00) [Active]    Is current diet order appropriate/adequate? [x] Yes    PO intake:   [x] >75%  Adequate    [] 50-75%  Fair       [] <50%  Poor   - Pt reports adequate PO intake at this time; RD observed pt consumed ~100% of lunch this afternoon    Nutrition-related concerns:  - s/p heart transplant . Ordered for steroid taper, tacrolimus, Cellcept  - status post cardiac biopsy   - Pt with variable blood sugars; hyperglycemic; Endocrine following; now off insulin gtt and ordered for 30 units lantus and ISS admelog    GI: Last BM: 1/1 x1 per flow sheets. Bowel Regimen? [x] Yes  [] No  -> Ordered for Reglan     Chest tube output:  M2: 40 mL (/3 thus far), 100 mL ()    Weights:   Daily Weight in k.1 (), Weight in k.6 (-), Weight in k.6 (-31), Weight in k.9 (12-30), Weight in k (12-30), Weight in k.2 (12-29), Weight in k.1 (-28)  Wt history: Pt reports UBW ~180-190 pounds. On/off diuresis. on IV bumex.  RD will continue to trend as new wts available/able.     Drug Dosing Weight  Height (cm): 175.3 (24 Dec 2023 15:23)  Weight (kg): 98.9 (24 Dec 2023 15:23)  BMI (kg/m2): 32.2 (24 Dec 2023 15:23)-> recalculated based on low on daily wt of 87.4 kG () (28.4 kG/m2).  Reported UBW: ~81-86 kG    MEDICATIONS  (STANDING):  acetaminophen     Tablet .. 650 milliGRAM(s) Oral every 6 hours  budesonide  80 MICROgram(s)/formoterol 4.5 MICROgram(s) Inhaler 1 Puff(s) Inhalation two times a day  buMETAnide Injectable 1 milliGRAM(s) IV Push <User Schedule>  chlorhexidine 4% Liquid 1 Application(s) Topical <User Schedule>  dextrose 5%. 1000 milliLiter(s) (100 mL/Hr) IV Continuous <Continuous>  dextrose 5%. 1000 milliLiter(s) (50 mL/Hr) IV Continuous <Continuous>  dextrose 50% Injectable 50 milliLiter(s) IV Push every 15 minutes  fluconAZOLE   Tablet 100 milliGRAM(s) Oral <User Schedule>  glucagon  Injectable 1 milliGRAM(s) IntraMuscular once  hydrALAZINE 100 milliGRAM(s) Oral every 8 hours  insulin glargine Injectable (LANTUS) 30 Unit(s) SubCutaneous <User Schedule>  insulin lispro (ADMELOG) corrective regimen sliding scale   SubCutaneous three times a day before meals  insulin lispro (ADMELOG) corrective regimen sliding scale   SubCutaneous at bedtime  insulin lispro Injectable (ADMELOG) 17 Unit(s) SubCutaneous three times a day before meals  insulin regular Infusion 3 Unit(s)/Hr (3 mL/Hr) IV Continuous <Continuous>  lidocaine   4% Patch 3 Patch Transdermal daily  magnesium oxide 400 milliGRAM(s) Oral three times a day with meals  melatonin 5 milliGRAM(s) Oral at bedtime  metoclopramide Injectable 10 milliGRAM(s) IV Push every 8 hours  mycophenolate mofetil 1000 milliGRAM(s) Oral every 12 hours  naloxegol 25 milliGRAM(s) Oral daily  NIFEdipine XL 60 milliGRAM(s) Oral every 12 hours  OLANZapine 5 milliGRAM(s) Oral at bedtime  polyethylene glycol 3350 17 Gram(s) Oral daily  predniSONE   Tablet 15 milliGRAM(s) Oral every 12 hours  pregabalin 50 milliGRAM(s) Oral every 8 hours  senna 2 Tablet(s) Oral at bedtime  tacrolimus 6 milliGRAM(s) Oral <User Schedule>  trimethoprim   80 mG/sulfamethoxazole 400 mG 1 Tablet(s) Oral daily  valGANciclovir 450 milliGRAM(s) Oral every 12 hours    Pertinent Labs: - @ 07:16: Na 131<L>, BUN 55<H>, Cr 1.26, <H>, K+ 4.7, Phos 3.6, Mg 1.8, Alk Phos --, ALT/SGPT --, AST/SGOT --, HbA1c --    A1C with Estimated Average Glucose Result: 6.1 % (24 @ 07:26)  A1C with Estimated Average Glucose Result: 8.3 % (23 @ 06:14)    Finger Sticks:  POCT Blood Glucose.: 204 mg/dL ( @ 11:25)  POCT Blood Glucose.: 173 mg/dL ( @ 07:28)  POCT Blood Glucose.: 129 mg/dL ( @ 04:40)  POCT Blood Glucose.: 184 mg/dL ( @ 04:37)  POCT Blood Glucose.: 99 mg/dL ( @ 03:54)  POCT Blood Glucose.: 113 mg/dL ( @ 02:57)  POCT Blood Glucose.: 148 mg/dL ( @ 02:03)  POCT Blood Glucose.: 175 mg/dL ( @ 00:58)  POCT Blood Glucose.: 218 mg/dL ( @ 23:53)  POCT Blood Glucose.: 281 mg/dL ( @ 22:58)  POCT Blood Glucose.: 393 mg/dL ( @ 21:40)  POCT Blood Glucose.: 267 mg/dL ( @ 16:43)    Skin per nursing documentation: No pressure injuries noted.  Edema per nursing documentation: 1+ B/L feet per flow sheets    Estimated Needs:   Based on low on daily wt of 87.4 kG (12-03) with consideration for increased nutrient needs:  Estimated Caloric Needs: (26-30 kcal/kg): 4294-0848 kcal  Estimated Protein Needs: (1.3-1.6 g/kg): 114-140 gm  Defer fluid needs to team.    Previous Nutrition Diagnosis: Increased nutrient needs  Nutrition Diagnosis is: [x] ongoing - being addressed with adequate oral intake    Previous Nutrition Diagnosis: Inadequate energy intake  Nutrition Diagnosis is: [x] ongoing; pt appetite has improved    Previous Nutrition Diagnosis: Food & Nutrition-Related Knowledge Deficit   Nutrition Diagnosis is: [x] ongoing - being addressed with ongoing diet education PRN    New Nutrition Diagnosis: [x] N/A    Nutrition Care Plan:  [x] In Progress  [] Achieved  [] Not applicable    Nutrition Interventions:     Education Provided:       [x] Yes:  RD reviewed food safety after solid organ transplant guidelines; including, storage/cooking temperatures, cross contamination, expiration dates, and avoiding buffets and avoid eating out x3-6 m onths. Discussed S&S of food borne illness.  Reviewed food and immunosuppressive drug interactions (prednisone, tacrolimus, and cellcept). Reviewed the importance of BG control while on prednisone. Reviewed importance of a low K+ diet and reviewed foods high in K+ to be mindful of. Reviewed importance of avoiding grapefruit, pomegranate, starfruit and sour oranges. Pt was receptive to information, however would benefit from education follow up as pt very tired at today's visit. Pt accepted written materials on discussed topics.   Written materials provided: USDA Food Safety Booklet, Potassium Content of Foods List, Heart Healthy Consistent Carbohydrate Medical Nutrition Therapy Handouts     Recommendations:  1. Continue consistent carbohydrate diet as tolerated   -> Fluid needs deferred to provider. Consistency deferred to SLP and provider.   2. RD remains available to provide ongoing nutrition education PRN   3. Honor pt food preferences to promote adequate oral intake while in-house     Monitoring and Evaluation:   Continue to monitor nutritional intake, tolerance to diet prescription, weights, labs, skin integrity    RD remains available upon request and will follow up per protocol  Sultana Velazquez, MS, RD, CDN, CNSC avail. on [employer] Teams

## 2024-01-03 NOTE — PROGRESS NOTE ADULT - PROBLEM SELECTOR PLAN 5
- pretransplant was noted to have positive BCx staph epi, Enterococcus faecalis and Cutibacterium   - s/p IABP exchange from RFA to LFA on 11/20.  - appreciated transplant ID recs.  - post transplant has been afebrile. - pretransplant was noted to have positive BCx staph epi, Enterococcus faecalis and Cutibacterium   - s/p IABP exchange from RFA to LFA on 11/20.  - appreciated transplant ID recs.  - post transplant has been afebrile however was noted to have rise in WBC today  - will plan to send blood cx x 2 and send RVP

## 2024-01-03 NOTE — PROGRESS NOTE ADULT - SUBJECTIVE AND OBJECTIVE BOX
INFECTIOUS DISEASES FOLLOW UP-- Courtney Peters  [phone]    This is a follow up note for this  58yMale with  Non-ST elevation myocardial infarction (NSTEMI)    s/p heart transplant- feeling well    ROS:  CONSTITUTIONAL:  No fever, good appetite  CARDIOVASCULAR:  No chest pain or palpitations  RESPIRATORY:  No dyspnea  GASTROINTESTINAL:  No nausea, vomiting, diarrhea, or abdominal pain  GENITOURINARY:  No dysuria  NEUROLOGIC:  No headache,     Allergies    penicillins (Unknown)    Intolerances        ANTIBIOTICS/RELEVANT:  antimicrobials  fluconAZOLE   Tablet 100 milliGRAM(s) Oral <User Schedule>  trimethoprim   80 mG/sulfamethoxazole 400 mG 1 Tablet(s) Oral daily  valGANciclovir 450 milliGRAM(s) Oral every 12 hours    immunologic:  mycophenolate mofetil 1000 milliGRAM(s) Oral every 12 hours  tacrolimus 5 milliGRAM(s) Oral <User Schedule>    OTHER:  acetaminophen     Tablet .. 650 milliGRAM(s) Oral every 6 hours  budesonide  80 MICROgram(s)/formoterol 4.5 MICROgram(s) Inhaler 1 Puff(s) Inhalation two times a day  buMETAnide Injectable 1 milliGRAM(s) IV Push <User Schedule>  chlorhexidine 4% Liquid 1 Application(s) Topical <User Schedule>  dextrose 5%. 1000 milliLiter(s) IV Continuous <Continuous>  dextrose 5%. 1000 milliLiter(s) IV Continuous <Continuous>  dextrose 50% Injectable 50 milliLiter(s) IV Push every 15 minutes  dextrose Oral Gel 15 Gram(s) Oral once PRN  glucagon  Injectable 1 milliGRAM(s) IntraMuscular once  hydrALAZINE 100 milliGRAM(s) Oral every 8 hours  insulin glargine Injectable (LANTUS) 30 Unit(s) SubCutaneous <User Schedule>  insulin lispro (ADMELOG) corrective regimen sliding scale   SubCutaneous three times a day before meals  insulin lispro (ADMELOG) corrective regimen sliding scale   SubCutaneous <User Schedule>  insulin lispro Injectable (ADMELOG) 17 Unit(s) SubCutaneous three times a day before meals  insulin regular Infusion 3 Unit(s)/Hr IV Continuous <Continuous>  lidocaine   4% Patch 3 Patch Transdermal daily  magnesium oxide 400 milliGRAM(s) Oral three times a day with meals  melatonin 5 milliGRAM(s) Oral at bedtime  metoclopramide Injectable 10 milliGRAM(s) IV Push every 8 hours  naloxegol 25 milliGRAM(s) Oral daily  NIFEdipine XL 60 milliGRAM(s) Oral every 12 hours  OLANZapine 5 milliGRAM(s) Oral at bedtime  polyethylene glycol 3350 17 Gram(s) Oral daily  predniSONE   Tablet 15 milliGRAM(s) Oral every 12 hours  pregabalin 50 milliGRAM(s) Oral every 8 hours  senna 2 Tablet(s) Oral at bedtime      Objective:  Vital Signs Last 24 Hrs  T(C): 36.3 (03 Jan 2024 11:19), Max: 37 (03 Jan 2024 04:00)  T(F): 97.4 (03 Jan 2024 11:19), Max: 98.6 (03 Jan 2024 04:00)  HR: 100 (03 Jan 2024 17:47) (97 - 122)  BP: 111/67 (03 Jan 2024 17:47) (97/66 - 127/74)  BP(mean): 82 (03 Jan 2024 17:47) (77 - 93)  RR: 18 (03 Jan 2024 17:47) (18 - 18)  SpO2: 97% (03 Jan 2024 17:47) (96% - 100%)    Parameters below as of 03 Jan 2024 17:47  Patient On (Oxygen Delivery Method): room air        PHYSICAL EXAM:  Constitutional:no acute distress  Eyes:MARVEL, EOMI  Ear/Nose/Throat: no oral lesions, 	  Respiratory: clear BL single chest tube  Cardiovascular: S1S2 sternotomy healed no erythema  Gastrointestinal:soft, (+) BS, no tenderness  Extremities:no e/e/c  No Lymphadenopathy  IV sites not inflammed.    LABS:                        11.7   16.20 )-----------( 231      ( 03 Jan 2024 07:18 )             34.8     01-03    131<L>  |  93<L>  |  55<H>  ----------------------------<  150<H>  4.7   |  21<L>  |  1.26    Ca    9.9      03 Jan 2024 07:16  Phos  3.6     01-03  Mg     1.8     01-03    TPro  6.9  /  Alb  4.4  /  TBili  0.6  /  DBili  x   /  AST  31  /  ALT  79<H>  /  AlkPhos  54  01-02      Urinalysis Basic - ( 03 Jan 2024 07:16 )    Color: x / Appearance: x / SG: x / pH: x  Gluc: 150 mg/dL / Ketone: x  / Bili: x / Urobili: x   Blood: x / Protein: x / Nitrite: x   Leuk Esterase: x / RBC: x / WBC x   Sq Epi: x / Non Sq Epi: x / Bacteria: x        MICROBIOLOGY:      Respiratory Viral Panel with COVID-19 by TIM (01.03.24 @ 11:53)    [facility]   SARS-CoV-2: NotDete: This Respiratory Panel uses polymerase chain reaction (PCR) to detect for  adenovirus; coronavirus (HKU1, NL63, 229E, OC43); human metapneumovirus  (hMPV); human enterovirus/rhinovirus (Entero/RV); influenza A; influenza  A/H1; influenza A/H3; influenza A/H1-2009; influenza B; parainfluenza  viruses 1, 2, 3, 4; respiratory syncytial virus; Mycoplasma pneumoniae;  Chlamydophila pneumoniae; and SARS-CoV-2.          RECENT CULTURES:      RADIOLOGY & ADDITIONAL STUDIES:    < from: Xray Chest 1 View- PORTABLE-Routine (Xray Chest 1 View- PORTABLE-Routine in AM.) (01.02.24 @ 18:26) >  Frontal expiratory view of the chest shows the heart to be similar in   size. Mediastinal drain remains present.    The lungs show clear right lung with less left atelectasis and there is   no evidence of pneumothorax nor pleural effusion.    IMPRESSION:  Less atelectasis.    < end of copied text >   INFECTIOUS DISEASES FOLLOW UP-- Courtney Peters  [phone]    This is a follow up note for this  58yMale with  Non-ST elevation myocardial infarction (NSTEMI)    s/p heart transplant- feeling well    ROS:  CONSTITUTIONAL:  No fever, good appetite  CARDIOVASCULAR:  No chest pain or palpitations  RESPIRATORY:  No dyspnea  GASTROINTESTINAL:  No nausea, vomiting, diarrhea, or abdominal pain  GENITOURINARY:  No dysuria  NEUROLOGIC:  No headache,     Allergies    penicillins (Unknown)    Intolerances        ANTIBIOTICS/RELEVANT:  antimicrobials  fluconAZOLE   Tablet 100 milliGRAM(s) Oral <User Schedule>  trimethoprim   80 mG/sulfamethoxazole 400 mG 1 Tablet(s) Oral daily  valGANciclovir 450 milliGRAM(s) Oral every 12 hours    immunologic:  mycophenolate mofetil 1000 milliGRAM(s) Oral every 12 hours  tacrolimus 5 milliGRAM(s) Oral <User Schedule>    OTHER:  acetaminophen     Tablet .. 650 milliGRAM(s) Oral every 6 hours  budesonide  80 MICROgram(s)/formoterol 4.5 MICROgram(s) Inhaler 1 Puff(s) Inhalation two times a day  buMETAnide Injectable 1 milliGRAM(s) IV Push <User Schedule>  chlorhexidine 4% Liquid 1 Application(s) Topical <User Schedule>  dextrose 5%. 1000 milliLiter(s) IV Continuous <Continuous>  dextrose 5%. 1000 milliLiter(s) IV Continuous <Continuous>  dextrose 50% Injectable 50 milliLiter(s) IV Push every 15 minutes  dextrose Oral Gel 15 Gram(s) Oral once PRN  glucagon  Injectable 1 milliGRAM(s) IntraMuscular once  hydrALAZINE 100 milliGRAM(s) Oral every 8 hours  insulin glargine Injectable (LANTUS) 30 Unit(s) SubCutaneous <User Schedule>  insulin lispro (ADMELOG) corrective regimen sliding scale   SubCutaneous three times a day before meals  insulin lispro (ADMELOG) corrective regimen sliding scale   SubCutaneous <User Schedule>  insulin lispro Injectable (ADMELOG) 17 Unit(s) SubCutaneous three times a day before meals  insulin regular Infusion 3 Unit(s)/Hr IV Continuous <Continuous>  lidocaine   4% Patch 3 Patch Transdermal daily  magnesium oxide 400 milliGRAM(s) Oral three times a day with meals  melatonin 5 milliGRAM(s) Oral at bedtime  metoclopramide Injectable 10 milliGRAM(s) IV Push every 8 hours  naloxegol 25 milliGRAM(s) Oral daily  NIFEdipine XL 60 milliGRAM(s) Oral every 12 hours  OLANZapine 5 milliGRAM(s) Oral at bedtime  polyethylene glycol 3350 17 Gram(s) Oral daily  predniSONE   Tablet 15 milliGRAM(s) Oral every 12 hours  pregabalin 50 milliGRAM(s) Oral every 8 hours  senna 2 Tablet(s) Oral at bedtime      Objective:  Vital Signs Last 24 Hrs  T(C): 36.3 (03 Jan 2024 11:19), Max: 37 (03 Jan 2024 04:00)  T(F): 97.4 (03 Jan 2024 11:19), Max: 98.6 (03 Jan 2024 04:00)  HR: 100 (03 Jan 2024 17:47) (97 - 122)  BP: 111/67 (03 Jan 2024 17:47) (97/66 - 127/74)  BP(mean): 82 (03 Jan 2024 17:47) (77 - 93)  RR: 18 (03 Jan 2024 17:47) (18 - 18)  SpO2: 97% (03 Jan 2024 17:47) (96% - 100%)    Parameters below as of 03 Jan 2024 17:47  Patient On (Oxygen Delivery Method): room air        PHYSICAL EXAM:  Constitutional:no acute distress  Eyes:MARVEL, EOMI  Ear/Nose/Throat: no oral lesions, 	  Respiratory: clear BL single chest tube  Cardiovascular: S1S2 sternotomy healed no erythema  Gastrointestinal:soft, (+) BS, no tenderness  Extremities:no e/e/c  No Lymphadenopathy  IV sites not inflammed.    LABS:                        11.7   16.20 )-----------( 231      ( 03 Jan 2024 07:18 )             34.8     01-03    131<L>  |  93<L>  |  55<H>  ----------------------------<  150<H>  4.7   |  21<L>  |  1.26    Ca    9.9      03 Jan 2024 07:16  Phos  3.6     01-03  Mg     1.8     01-03    TPro  6.9  /  Alb  4.4  /  TBili  0.6  /  DBili  x   /  AST  31  /  ALT  79<H>  /  AlkPhos  54  01-02      Urinalysis Basic - ( 03 Jan 2024 07:16 )    Color: x / Appearance: x / SG: x / pH: x  Gluc: 150 mg/dL / Ketone: x  / Bili: x / Urobili: x   Blood: x / Protein: x / Nitrite: x   Leuk Esterase: x / RBC: x / WBC x   Sq Epi: x / Non Sq Epi: x / Bacteria: x        MICROBIOLOGY:      Respiratory Viral Panel with COVID-19 by TIM (01.03.24 @ 11:53)    Rapid RVP Result: [facility]   SARS-CoV-2: NotDete: This Respiratory Panel uses polymerase chain reaction (PCR) to detect for  adenovirus; coronavirus (HKU1, NL63, 229E, OC43); human metapneumovirus  (hMPV); human enterovirus/rhinovirus (Entero/RV); influenza A; influenza  A/H1; influenza A/H3; influenza A/H1-2009; influenza B; parainfluenza  viruses 1, 2, 3, 4; respiratory syncytial virus; Mycoplasma pneumoniae;  Chlamydophila pneumoniae; and SARS-CoV-2.          RECENT CULTURES:      RADIOLOGY & ADDITIONAL STUDIES:    < from: Xray Chest 1 View- PORTABLE-Routine (Xray Chest 1 View- PORTABLE-Routine in AM.) (01.02.24 @ 18:26) >  Frontal expiratory view of the chest shows the heart to be similar in   size. Mediastinal drain remains present.    The lungs show clear right lung with less left atelectasis and there is   no evidence of pneumothorax nor pleural effusion.    IMPRESSION:  Less atelectasis.    < end of copied text >

## 2024-01-03 NOTE — BH CONSULTATION LIAISON PROGRESS NOTE - NSBHCHARTREVIEWLAB_PSY_A_CORE FT
11.2   11.22 )-----------( 274      ( 20 Nov 2023 00:09 )             33.4     11-20    130<L>  |  100  |  38<H>  ----------------------------<  185<H>  4.5   |  16<L>  |  1.79<H>    Ca    9.8      20 Nov 2023 00:09  Phos  3.5     11-20  Mg     1.9     11-20    TPro  7.6  /  Alb  3.7  /  TBili  0.4  /  DBili  x   /  AST  25  /  ALT  46<H>  /  AlkPhos  69  11-20    Urinalysis Basic - ( 20 Nov 2023 00:09 )    Color: x / Appearance: x / SG: x / pH: x  Gluc: 185 mg/dL / Ketone: x  / Bili: x / Urobili: x   Blood: x / Protein: x / Nitrite: x   Leuk Esterase: x / RBC: x / WBC x   Sq Epi: x / Non Sq Epi: x / Bacteria: x                           11.7   16.20 )-----------( 231      ( 03 Jan 2024 07:18 )             34.8     01-03    131<L>  |  93<L>  |  55<H>  ----------------------------<  150<H>  4.7   |  21<L>  |  1.26    Ca    9.9      03 Jan 2024 07:16  Phos  3.6     01-03  Mg     1.8     01-03    TPro  6.9  /  Alb  4.4  /  TBili  0.6  /  DBili  x   /  AST  31  /  ALT  79<H>  /  AlkPhos  54  01-02

## 2024-01-03 NOTE — PROGRESS NOTE ADULT - PROVIDER SPECIALTY LIST ADULT
Transplant Cardiology

## 2024-01-03 NOTE — PROGRESS NOTE ADULT - PROBLEM SELECTOR PLAN 2
- s/p Simulect on POD 12/25 and received second dose on POD 4 (12/29).  - Continue with solumedrol taper per protocol, plan to transition to prednisone within the upcoming days   - Continue with Cellcept 1g PO BID  - Will continue to adjust tacro as needed for goal will be 12-14. Now on fluconazole in order to help boost tacro levels - s/p Simulect on POD 12/25 and received second dose on POD 4 (12/29).  - Continue with solumedrol taper per protocol, plan to transition to prednisone today  - Continue with Cellcept 1g PO BID  - Will continue to adjust tacro as needed for goal will be 12-14. Now on fluconazole in order to help boost tacro levels

## 2024-01-03 NOTE — PROGRESS NOTE ADULT - PROBLEM SELECTOR PLAN 3
- CMV -/+: intermediate risk.  Continue Valcyte 450 mg PO BID   - Toxo -/-: none required  - PJP ppx: continue Bactrim S/S PO QD  - Trush ppx: fluconazole as stated above   - donor HCV TIM - but HCV antibody +.

## 2024-01-03 NOTE — PROGRESS NOTE ADULT - SUBJECTIVE AND OBJECTIVE BOX
ADVANCED HEART FAILURE & TRANSPLANT- PROGRESS NOTE  *To reach the NS1 Team from 8am to 5pm, please call [phone].  ___________________________________________________________________________    Subjective:    Medications:  acetaminophen     Tablet .. 650 milliGRAM(s) Oral every 6 hours  budesonide  80 MICROgram(s)/formoterol 4.5 MICROgram(s) Inhaler 1 Puff(s) Inhalation two times a day  buMETAnide Injectable 1 milliGRAM(s) IV Push <User Schedule>  chlorhexidine 4% Liquid 1 Application(s) Topical <User Schedule>  dextrose 5%. 1000 milliLiter(s) IV Continuous <Continuous>  dextrose 5%. 1000 milliLiter(s) IV Continuous <Continuous>  dextrose 50% Injectable 50 milliLiter(s) IV Push every 15 minutes  dextrose Oral Gel 15 Gram(s) Oral once PRN  fluconAZOLE   Tablet 100 milliGRAM(s) Oral <User Schedule>  glucagon  Injectable 1 milliGRAM(s) IntraMuscular once  hydrALAZINE 100 milliGRAM(s) Oral every 8 hours  insulin glargine Injectable (LANTUS) 25 Unit(s) SubCutaneous <User Schedule>  insulin lispro (ADMELOG) corrective regimen sliding scale   SubCutaneous three times a day before meals  insulin lispro (ADMELOG) corrective regimen sliding scale   SubCutaneous at bedtime  insulin lispro Injectable (ADMELOG) 14 Unit(s) SubCutaneous three times a day before meals  insulin regular Infusion 3 Unit(s)/Hr IV Continuous <Continuous>  lidocaine   4% Patch 3 Patch Transdermal daily  magnesium oxide 400 milliGRAM(s) Oral three times a day with meals  melatonin 5 milliGRAM(s) Oral at bedtime  methylPREDNISolone sodium succinate Injectable   IV Push   methylPREDNISolone sodium succinate Injectable 12 milliGRAM(s) IV Push every 12 hours  metoclopramide Injectable 10 milliGRAM(s) IV Push every 8 hours  mycophenolate mofetil 1000 milliGRAM(s) Oral every 12 hours  naloxegol 25 milliGRAM(s) Oral daily  NIFEdipine XL 60 milliGRAM(s) Oral every 12 hours  OLANZapine 5 milliGRAM(s) Oral at bedtime  pantoprazole  Injectable 40 milliGRAM(s) IV Push daily  polyethylene glycol 3350 17 Gram(s) Oral daily  pregabalin 50 milliGRAM(s) Oral every 8 hours  senna 2 Tablet(s) Oral at bedtime  tacrolimus 6 milliGRAM(s) Oral <User Schedule>  trimethoprim   80 mG/sulfamethoxazole 400 mG 1 Tablet(s) Oral daily  valGANciclovir 450 milliGRAM(s) Oral every 12 hours      Vitals:  Vital Signs Last 24 Hours  T(C): 37 (01-03-24 @ 04:00), Max: 37 (01-03-24 @ 04:00)  HR: 98 (01-03-24 @ 04:00) (96 - 109)  BP: 127/74 (01-03-24 @ 04:00) (105/71 - 127/74)  RR: 18 (01-03-24 @ 04:00) (16 - 18)  SpO2: 98% (01-03-24 @ 04:00) (94% - 98%)        I&O's Summary    02 Jan 2024 07:01  -  03 Jan 2024 07:00  --------------------------------------------------------  IN: 1044 mL / OUT: 2600 mL / NET: -1556 mL        Physical Exam  General: No distress. Comfortable.  Neck: Neck supple. JVP not elevated. No masses  Chest: Clear to auscultation bilaterally, + CT   CV: Normal S1 and S2.   Abdomen: Soft, non-distended, non-tender  Extremities: warm and perfused  Neurology: Alert and oriented times three.     Labs:                        11.7   16.20 )-----------( 231      ( 03 Jan 2024 07:18 )             34.8     01-02    130<L>  |  91<L>  |  37<H>  ----------------------------<  191<H>  5.2   |  26  |  0.87    Ca    9.8      02 Jan 2024 07:26  Phos  2.6     01-02  Mg     1.5     01-02    TPro  6.9  /  Alb  4.4  /  TBili  0.6  /  DBili  x   /  AST  31  /  ALT  79<H>  /  AlkPhos  54  01-02                     ADVANCED HEART FAILURE & TRANSPLANT- PROGRESS NOTE  *To reach the NS1 Team from 8am to 5pm, please call [phone].  ___________________________________________________________________________    Subjective:    Medications:  acetaminophen     Tablet .. 650 milliGRAM(s) Oral every 6 hours  budesonide  80 MICROgram(s)/formoterol 4.5 MICROgram(s) Inhaler 1 Puff(s) Inhalation two times a day  buMETAnide Injectable 1 milliGRAM(s) IV Push <User Schedule>  chlorhexidine 4% Liquid 1 Application(s) Topical <User Schedule>  dextrose 5%. 1000 milliLiter(s) IV Continuous <Continuous>  dextrose 5%. 1000 milliLiter(s) IV Continuous <Continuous>  dextrose 50% Injectable 50 milliLiter(s) IV Push every 15 minutes  dextrose Oral Gel 15 Gram(s) Oral once PRN  fluconAZOLE   Tablet 100 milliGRAM(s) Oral <User Schedule>  glucagon  Injectable 1 milliGRAM(s) IntraMuscular once  hydrALAZINE 100 milliGRAM(s) Oral every 8 hours  insulin glargine Injectable (LANTUS) 25 Unit(s) SubCutaneous <User Schedule>  insulin lispro (ADMELOG) corrective regimen sliding scale   SubCutaneous three times a day before meals  insulin lispro (ADMELOG) corrective regimen sliding scale   SubCutaneous at bedtime  insulin lispro Injectable (ADMELOG) 14 Unit(s) SubCutaneous three times a day before meals  insulin regular Infusion 3 Unit(s)/Hr IV Continuous <Continuous>  lidocaine   4% Patch 3 Patch Transdermal daily  magnesium oxide 400 milliGRAM(s) Oral three times a day with meals  melatonin 5 milliGRAM(s) Oral at bedtime  methylPREDNISolone sodium succinate Injectable   IV Push   methylPREDNISolone sodium succinate Injectable 12 milliGRAM(s) IV Push every 12 hours  metoclopramide Injectable 10 milliGRAM(s) IV Push every 8 hours  mycophenolate mofetil 1000 milliGRAM(s) Oral every 12 hours  naloxegol 25 milliGRAM(s) Oral daily  NIFEdipine XL 60 milliGRAM(s) Oral every 12 hours  OLANZapine 5 milliGRAM(s) Oral at bedtime  pantoprazole  Injectable 40 milliGRAM(s) IV Push daily  polyethylene glycol 3350 17 Gram(s) Oral daily  pregabalin 50 milliGRAM(s) Oral every 8 hours  senna 2 Tablet(s) Oral at bedtime  tacrolimus 6 milliGRAM(s) Oral <User Schedule>  trimethoprim   80 mG/sulfamethoxazole 400 mG 1 Tablet(s) Oral daily  valGANciclovir 450 milliGRAM(s) Oral every 12 hours      Vitals:  Vital Signs Last 24 Hours  T(C): 37 (01-03-24 @ 04:00), Max: 37 (01-03-24 @ 04:00)  HR: 98 (01-03-24 @ 04:00) (96 - 109)  BP: 127/74 (01-03-24 @ 04:00) (105/71 - 127/74)  RR: 18 (01-03-24 @ 04:00) (16 - 18)  SpO2: 98% (01-03-24 @ 04:00) (94% - 98%)        I&O's Summary    02 Jan 2024 07:01  -  03 Jan 2024 07:00  --------------------------------------------------------  IN: 1044 mL / OUT: 2600 mL / NET: -1556 mL        Physical Exam  General: No distress. Comfortable.  Neck: Neck supple. JVP not elevated. No masses  Chest: Clear to auscultation bilaterally, + CT   CV: Normal S1 and S2.   Abdomen: Soft, non-distended, non-tender  Extremities: warm and perfused  Neurology: Alert and oriented times three.     Labs:                        11.7   16.20 )-----------( 231      ( 03 Jan 2024 07:18 )             34.8     01-02    130<L>  |  91<L>  |  37<H>  ----------------------------<  191<H>  5.2   |  26  |  0.87    Ca    9.8      02 Jan 2024 07:26  Phos  2.6     01-02  Mg     1.5     01-02    TPro  6.9  /  Alb  4.4  /  TBili  0.6  /  DBili  x   /  AST  31  /  ALT  79<H>  /  AlkPhos  54  01-02                     ADVANCED HEART FAILURE & TRANSPLANT- PROGRESS NOTE  *To reach the NS1 Team from 8am to 5pm, please call [phone].  ___________________________________________________________________________    Subjective:  - feels great today and states he slept well    Medications:  acetaminophen     Tablet .. 650 milliGRAM(s) Oral every 6 hours  budesonide  80 MICROgram(s)/formoterol 4.5 MICROgram(s) Inhaler 1 Puff(s) Inhalation two times a day  buMETAnide Injectable 1 milliGRAM(s) IV Push <User Schedule>  chlorhexidine 4% Liquid 1 Application(s) Topical <User Schedule>  dextrose 5%. 1000 milliLiter(s) IV Continuous <Continuous>  dextrose 5%. 1000 milliLiter(s) IV Continuous <Continuous>  dextrose 50% Injectable 50 milliLiter(s) IV Push every 15 minutes  dextrose Oral Gel 15 Gram(s) Oral once PRN  fluconAZOLE   Tablet 100 milliGRAM(s) Oral <User Schedule>  glucagon  Injectable 1 milliGRAM(s) IntraMuscular once  hydrALAZINE 100 milliGRAM(s) Oral every 8 hours  insulin glargine Injectable (LANTUS) 25 Unit(s) SubCutaneous <User Schedule>  insulin lispro (ADMELOG) corrective regimen sliding scale   SubCutaneous three times a day before meals  insulin lispro (ADMELOG) corrective regimen sliding scale   SubCutaneous at bedtime  insulin lispro Injectable (ADMELOG) 14 Unit(s) SubCutaneous three times a day before meals  insulin regular Infusion 3 Unit(s)/Hr IV Continuous <Continuous>  lidocaine   4% Patch 3 Patch Transdermal daily  magnesium oxide 400 milliGRAM(s) Oral three times a day with meals  melatonin 5 milliGRAM(s) Oral at bedtime  methylPREDNISolone sodium succinate Injectable   IV Push   methylPREDNISolone sodium succinate Injectable 12 milliGRAM(s) IV Push every 12 hours  metoclopramide Injectable 10 milliGRAM(s) IV Push every 8 hours  mycophenolate mofetil 1000 milliGRAM(s) Oral every 12 hours  naloxegol 25 milliGRAM(s) Oral daily  NIFEdipine XL 60 milliGRAM(s) Oral every 12 hours  OLANZapine 5 milliGRAM(s) Oral at bedtime  pantoprazole  Injectable 40 milliGRAM(s) IV Push daily  polyethylene glycol 3350 17 Gram(s) Oral daily  pregabalin 50 milliGRAM(s) Oral every 8 hours  senna 2 Tablet(s) Oral at bedtime  tacrolimus 6 milliGRAM(s) Oral <User Schedule>  trimethoprim   80 mG/sulfamethoxazole 400 mG 1 Tablet(s) Oral daily  valGANciclovir 450 milliGRAM(s) Oral every 12 hours      Vitals:  Vital Signs Last 24 Hours  T(C): 37 (01-03-24 @ 04:00), Max: 37 (01-03-24 @ 04:00)  HR: 98 (01-03-24 @ 04:00) (96 - 109)  BP: 127/74 (01-03-24 @ 04:00) (105/71 - 127/74)  RR: 18 (01-03-24 @ 04:00) (16 - 18)  SpO2: 98% (01-03-24 @ 04:00) (94% - 98%)        I&O's Summary    02 Jan 2024 07:01  -  03 Jan 2024 07:00  --------------------------------------------------------  IN: 1044 mL / OUT: 2600 mL / NET: -1556 mL        Physical Exam  General: No distress. Comfortable.  Neck: Neck supple. JVP not elevated. No masses  Chest: Clear to auscultation bilaterally, + CT   CV: Normal S1 and S2.   Abdomen: Soft, non-distended, non-tender  Extremities: warm and perfused  Neurology: Alert and oriented times three.     Labs:                        11.7   16.20 )-----------( 231      ( 03 Jan 2024 07:18 )             34.8     01-02    130<L>  |  91<L>  |  37<H>  ----------------------------<  191<H>  5.2   |  26  |  0.87    Ca    9.8      02 Jan 2024 07:26  Phos  2.6     01-02  Mg     1.5     01-02    TPro  6.9  /  Alb  4.4  /  TBili  0.6  /  DBili  x   /  AST  31  /  ALT  79<H>  /  AlkPhos  54  01-02                     ADVANCED HEART FAILURE & TRANSPLANT- PROGRESS NOTE  *To reach the NS1 Team from 8am to 5pm, please call [phone].  ___________________________________________________________________________    Subjective:  - feels great today and states he slept well    Medications:  acetaminophen     Tablet .. 650 milliGRAM(s) Oral every 6 hours  budesonide  80 MICROgram(s)/formoterol 4.5 MICROgram(s) Inhaler 1 Puff(s) Inhalation two times a day  buMETAnide Injectable 1 milliGRAM(s) IV Push <User Schedule>  chlorhexidine 4% Liquid 1 Application(s) Topical <User Schedule>  dextrose 5%. 1000 milliLiter(s) IV Continuous <Continuous>  dextrose 5%. 1000 milliLiter(s) IV Continuous <Continuous>  dextrose 50% Injectable 50 milliLiter(s) IV Push every 15 minutes  dextrose Oral Gel 15 Gram(s) Oral once PRN  fluconAZOLE   Tablet 100 milliGRAM(s) Oral <User Schedule>  glucagon  Injectable 1 milliGRAM(s) IntraMuscular once  hydrALAZINE 100 milliGRAM(s) Oral every 8 hours  insulin glargine Injectable (LANTUS) 25 Unit(s) SubCutaneous <User Schedule>  insulin lispro (ADMELOG) corrective regimen sliding scale   SubCutaneous three times a day before meals  insulin lispro (ADMELOG) corrective regimen sliding scale   SubCutaneous at bedtime  insulin lispro Injectable (ADMELOG) 14 Unit(s) SubCutaneous three times a day before meals  insulin regular Infusion 3 Unit(s)/Hr IV Continuous <Continuous>  lidocaine   4% Patch 3 Patch Transdermal daily  magnesium oxide 400 milliGRAM(s) Oral three times a day with meals  melatonin 5 milliGRAM(s) Oral at bedtime  methylPREDNISolone sodium succinate Injectable   IV Push   methylPREDNISolone sodium succinate Injectable 12 milliGRAM(s) IV Push every 12 hours  metoclopramide Injectable 10 milliGRAM(s) IV Push every 8 hours  mycophenolate mofetil 1000 milliGRAM(s) Oral every 12 hours  naloxegol 25 milliGRAM(s) Oral daily  NIFEdipine XL 60 milliGRAM(s) Oral every 12 hours  OLANZapine 5 milliGRAM(s) Oral at bedtime  pantoprazole  Injectable 40 milliGRAM(s) IV Push daily  polyethylene glycol 3350 17 Gram(s) Oral daily  pregabalin 50 milliGRAM(s) Oral every 8 hours  senna 2 Tablet(s) Oral at bedtime  tacrolimus 6 milliGRAM(s) Oral <User Schedule>  trimethoprim   80 mG/sulfamethoxazole 400 mG 1 Tablet(s) Oral daily  valGANciclovir 450 milliGRAM(s) Oral every 12 hours      Vitals:  Vital Signs Last 24 Hours  T(C): 37 (01-03-24 @ 04:00), Max: 37 (01-03-24 @ 04:00)  HR: 98 (01-03-24 @ 04:00) (96 - 109)  BP: 127/74 (01-03-24 @ 04:00) (105/71 - 127/74)  RR: 18 (01-03-24 @ 04:00) (16 - 18)  SpO2: 98% (01-03-24 @ 04:00) (94% - 98%)        I&O's Summary    02 Jan 2024 07:01  -  03 Jan 2024 07:00  --------------------------------------------------------  IN: 1044 mL / OUT: 2600 mL / NET: -1556 mL        Physical Exam  General: No distress. Comfortable.  Neck: Neck supple. JVP not elevated. No masses  Chest: Clear to auscultation bilaterally, + CT   CV: Normal S1 and S2.   Abdomen: Soft, non-distended, non-tender  Extremities: warm and perfused  Neurology: Alert and oriented times three.     Labs:                        11.7   16.20 )-----------( 231      ( 03 Jan 2024 07:18 )             34.8     01-02    130<L>  |  91<L>  |  37<H>  ----------------------------<  191<H>  5.2   |  26  |  0.87    Ca    9.8      02 Jan 2024 07:26  Phos  2.6     01-02  Mg     1.5     01-02    TPro  6.9  /  Alb  4.4  /  TBili  0.6  /  DBili  x   /  AST  31  /  ALT  79<H>  /  AlkPhos  54  01-02

## 2024-01-04 DIAGNOSIS — Z79.2 LONG TERM (CURRENT) USE OF ANTIBIOTICS: ICD-10-CM

## 2024-01-04 DIAGNOSIS — F41.9 ANXIETY DISORDER, UNSPECIFIED: ICD-10-CM

## 2024-01-04 LAB
ALBUMIN SERPL ELPH-MCNC: 4 G/DL — SIGNIFICANT CHANGE UP (ref 3.3–5)
ALBUMIN SERPL ELPH-MCNC: 4 G/DL — SIGNIFICANT CHANGE UP (ref 3.3–5)
ALP SERPL-CCNC: 54 U/L — SIGNIFICANT CHANGE UP (ref 40–120)
ALP SERPL-CCNC: 54 U/L — SIGNIFICANT CHANGE UP (ref 40–120)
ALT FLD-CCNC: 55 U/L — HIGH (ref 10–45)
ALT FLD-CCNC: 55 U/L — HIGH (ref 10–45)
ANION GAP SERPL CALC-SCNC: 15 MMOL/L — SIGNIFICANT CHANGE UP (ref 5–17)
ANION GAP SERPL CALC-SCNC: 15 MMOL/L — SIGNIFICANT CHANGE UP (ref 5–17)
AST SERPL-CCNC: 23 U/L — SIGNIFICANT CHANGE UP (ref 10–40)
AST SERPL-CCNC: 23 U/L — SIGNIFICANT CHANGE UP (ref 10–40)
BASOPHILS # BLD AUTO: 0.02 K/UL — SIGNIFICANT CHANGE UP (ref 0–0.2)
BASOPHILS # BLD AUTO: 0.02 K/UL — SIGNIFICANT CHANGE UP (ref 0–0.2)
BASOPHILS NFR BLD AUTO: 0.1 % — SIGNIFICANT CHANGE UP (ref 0–2)
BASOPHILS NFR BLD AUTO: 0.1 % — SIGNIFICANT CHANGE UP (ref 0–2)
BILIRUB SERPL-MCNC: 0.3 MG/DL — SIGNIFICANT CHANGE UP (ref 0.2–1.2)
BILIRUB SERPL-MCNC: 0.3 MG/DL — SIGNIFICANT CHANGE UP (ref 0.2–1.2)
BUN SERPL-MCNC: 62 MG/DL — HIGH (ref 7–23)
BUN SERPL-MCNC: 62 MG/DL — HIGH (ref 7–23)
CALCIUM SERPL-MCNC: 9.6 MG/DL — SIGNIFICANT CHANGE UP (ref 8.4–10.5)
CALCIUM SERPL-MCNC: 9.6 MG/DL — SIGNIFICANT CHANGE UP (ref 8.4–10.5)
CHLORIDE SERPL-SCNC: 90 MMOL/L — LOW (ref 96–108)
CHLORIDE SERPL-SCNC: 90 MMOL/L — LOW (ref 96–108)
CO2 SERPL-SCNC: 21 MMOL/L — LOW (ref 22–31)
CO2 SERPL-SCNC: 21 MMOL/L — LOW (ref 22–31)
CREAT SERPL-MCNC: 1.22 MG/DL — SIGNIFICANT CHANGE UP (ref 0.5–1.3)
CREAT SERPL-MCNC: 1.22 MG/DL — SIGNIFICANT CHANGE UP (ref 0.5–1.3)
EGFR: 68 ML/MIN/1.73M2 — SIGNIFICANT CHANGE UP
EGFR: 68 ML/MIN/1.73M2 — SIGNIFICANT CHANGE UP
EOSINOPHIL # BLD AUTO: 0.21 K/UL — SIGNIFICANT CHANGE UP (ref 0–0.5)
EOSINOPHIL # BLD AUTO: 0.21 K/UL — SIGNIFICANT CHANGE UP (ref 0–0.5)
EOSINOPHIL NFR BLD AUTO: 1.3 % — SIGNIFICANT CHANGE UP (ref 0–6)
EOSINOPHIL NFR BLD AUTO: 1.3 % — SIGNIFICANT CHANGE UP (ref 0–6)
GLUCOSE BLDC GLUCOMTR-MCNC: 180 MG/DL — HIGH (ref 70–99)
GLUCOSE BLDC GLUCOMTR-MCNC: 180 MG/DL — HIGH (ref 70–99)
GLUCOSE BLDC GLUCOMTR-MCNC: 214 MG/DL — HIGH (ref 70–99)
GLUCOSE BLDC GLUCOMTR-MCNC: 214 MG/DL — HIGH (ref 70–99)
GLUCOSE BLDC GLUCOMTR-MCNC: 242 MG/DL — HIGH (ref 70–99)
GLUCOSE BLDC GLUCOMTR-MCNC: 242 MG/DL — HIGH (ref 70–99)
GLUCOSE BLDC GLUCOMTR-MCNC: 285 MG/DL — HIGH (ref 70–99)
GLUCOSE BLDC GLUCOMTR-MCNC: 285 MG/DL — HIGH (ref 70–99)
GLUCOSE BLDC GLUCOMTR-MCNC: 93 MG/DL — SIGNIFICANT CHANGE UP (ref 70–99)
GLUCOSE BLDC GLUCOMTR-MCNC: 93 MG/DL — SIGNIFICANT CHANGE UP (ref 70–99)
GLUCOSE SERPL-MCNC: 298 MG/DL — HIGH (ref 70–99)
GLUCOSE SERPL-MCNC: 298 MG/DL — HIGH (ref 70–99)
HCT VFR BLD CALC: 34.9 % — LOW (ref 39–50)
HCT VFR BLD CALC: 34.9 % — LOW (ref 39–50)
HGB BLD-MCNC: 11.6 G/DL — LOW (ref 13–17)
HGB BLD-MCNC: 11.6 G/DL — LOW (ref 13–17)
IMM GRANULOCYTES NFR BLD AUTO: 0.7 % — SIGNIFICANT CHANGE UP (ref 0–0.9)
IMM GRANULOCYTES NFR BLD AUTO: 0.7 % — SIGNIFICANT CHANGE UP (ref 0–0.9)
LYMPHOCYTES # BLD AUTO: 1.53 K/UL — SIGNIFICANT CHANGE UP (ref 1–3.3)
LYMPHOCYTES # BLD AUTO: 1.53 K/UL — SIGNIFICANT CHANGE UP (ref 1–3.3)
LYMPHOCYTES # BLD AUTO: 9.5 % — LOW (ref 13–44)
LYMPHOCYTES # BLD AUTO: 9.5 % — LOW (ref 13–44)
MAGNESIUM SERPL-MCNC: 2 MG/DL — SIGNIFICANT CHANGE UP (ref 1.6–2.6)
MAGNESIUM SERPL-MCNC: 2 MG/DL — SIGNIFICANT CHANGE UP (ref 1.6–2.6)
MCHC RBC-ENTMCNC: 29.6 PG — SIGNIFICANT CHANGE UP (ref 27–34)
MCHC RBC-ENTMCNC: 29.6 PG — SIGNIFICANT CHANGE UP (ref 27–34)
MCHC RBC-ENTMCNC: 33.2 GM/DL — SIGNIFICANT CHANGE UP (ref 32–36)
MCHC RBC-ENTMCNC: 33.2 GM/DL — SIGNIFICANT CHANGE UP (ref 32–36)
MCV RBC AUTO: 89 FL — SIGNIFICANT CHANGE UP (ref 80–100)
MCV RBC AUTO: 89 FL — SIGNIFICANT CHANGE UP (ref 80–100)
MONOCYTES # BLD AUTO: 0.75 K/UL — SIGNIFICANT CHANGE UP (ref 0–0.9)
MONOCYTES # BLD AUTO: 0.75 K/UL — SIGNIFICANT CHANGE UP (ref 0–0.9)
MONOCYTES NFR BLD AUTO: 4.7 % — SIGNIFICANT CHANGE UP (ref 2–14)
MONOCYTES NFR BLD AUTO: 4.7 % — SIGNIFICANT CHANGE UP (ref 2–14)
NEUTROPHILS # BLD AUTO: 13.49 K/UL — HIGH (ref 1.8–7.4)
NEUTROPHILS # BLD AUTO: 13.49 K/UL — HIGH (ref 1.8–7.4)
NEUTROPHILS NFR BLD AUTO: 83.7 % — HIGH (ref 43–77)
NEUTROPHILS NFR BLD AUTO: 83.7 % — HIGH (ref 43–77)
NRBC # BLD: 0 /100 WBCS — SIGNIFICANT CHANGE UP (ref 0–0)
NRBC # BLD: 0 /100 WBCS — SIGNIFICANT CHANGE UP (ref 0–0)
PHOSPHATE SERPL-MCNC: 3.5 MG/DL — SIGNIFICANT CHANGE UP (ref 2.5–4.5)
PHOSPHATE SERPL-MCNC: 3.5 MG/DL — SIGNIFICANT CHANGE UP (ref 2.5–4.5)
PLATELET # BLD AUTO: 216 K/UL — SIGNIFICANT CHANGE UP (ref 150–400)
PLATELET # BLD AUTO: 216 K/UL — SIGNIFICANT CHANGE UP (ref 150–400)
POTASSIUM SERPL-MCNC: 4.5 MMOL/L — SIGNIFICANT CHANGE UP (ref 3.5–5.3)
POTASSIUM SERPL-MCNC: 4.5 MMOL/L — SIGNIFICANT CHANGE UP (ref 3.5–5.3)
POTASSIUM SERPL-SCNC: 4.5 MMOL/L — SIGNIFICANT CHANGE UP (ref 3.5–5.3)
POTASSIUM SERPL-SCNC: 4.5 MMOL/L — SIGNIFICANT CHANGE UP (ref 3.5–5.3)
PROT SERPL-MCNC: 6.4 G/DL — SIGNIFICANT CHANGE UP (ref 6–8.3)
PROT SERPL-MCNC: 6.4 G/DL — SIGNIFICANT CHANGE UP (ref 6–8.3)
RAPID RVP RESULT: SIGNIFICANT CHANGE UP
RAPID RVP RESULT: SIGNIFICANT CHANGE UP
RBC # BLD: 3.92 M/UL — LOW (ref 4.2–5.8)
RBC # BLD: 3.92 M/UL — LOW (ref 4.2–5.8)
RBC # FLD: 15.7 % — HIGH (ref 10.3–14.5)
RBC # FLD: 15.7 % — HIGH (ref 10.3–14.5)
SARS-COV-2 RNA SPEC QL NAA+PROBE: SIGNIFICANT CHANGE UP
SARS-COV-2 RNA SPEC QL NAA+PROBE: SIGNIFICANT CHANGE UP
SODIUM SERPL-SCNC: 126 MMOL/L — LOW (ref 135–145)
SODIUM SERPL-SCNC: 126 MMOL/L — LOW (ref 135–145)
SURGICAL PATHOLOGY STUDY: SIGNIFICANT CHANGE UP
SURGICAL PATHOLOGY STUDY: SIGNIFICANT CHANGE UP
TACROLIMUS SERPL-MCNC: 9 NG/ML — SIGNIFICANT CHANGE UP
TACROLIMUS SERPL-MCNC: 9 NG/ML — SIGNIFICANT CHANGE UP
WBC # BLD: 15.97 K/UL — HIGH (ref 3.8–10.5)
WBC # BLD: 15.97 K/UL — HIGH (ref 3.8–10.5)
WBC # FLD AUTO: 15.97 K/UL — HIGH (ref 3.8–10.5)
WBC # FLD AUTO: 15.97 K/UL — HIGH (ref 3.8–10.5)

## 2024-01-04 PROCEDURE — 71045 X-RAY EXAM CHEST 1 VIEW: CPT | Mod: 26,77

## 2024-01-04 PROCEDURE — 71045 X-RAY EXAM CHEST 1 VIEW: CPT | Mod: 26

## 2024-01-04 PROCEDURE — 99232 SBSQ HOSP IP/OBS MODERATE 35: CPT

## 2024-01-04 PROCEDURE — 99233 SBSQ HOSP IP/OBS HIGH 50: CPT

## 2024-01-04 RX ORDER — SODIUM CHLORIDE 5 G/100ML
150 INJECTION, SOLUTION INTRAVENOUS
Refills: 0 | Status: DISCONTINUED | OUTPATIENT
Start: 2024-01-04 | End: 2024-01-04

## 2024-01-04 RX ORDER — TACROLIMUS 5 MG/1
0.5 CAPSULE ORAL ONCE
Refills: 0 | Status: COMPLETED | OUTPATIENT
Start: 2024-01-04 | End: 2024-01-04

## 2024-01-04 RX ORDER — SODIUM CHLORIDE 5 G/100ML
150 INJECTION, SOLUTION INTRAVENOUS
Refills: 0 | Status: COMPLETED | OUTPATIENT
Start: 2024-01-04 | End: 2024-01-04

## 2024-01-04 RX ORDER — BUMETANIDE 0.25 MG/ML
1 INJECTION INTRAMUSCULAR; INTRAVENOUS
Refills: 0 | Status: DISCONTINUED | OUTPATIENT
Start: 2024-01-04 | End: 2024-01-05

## 2024-01-04 RX ORDER — INSULIN LISPRO 100/ML
15 VIAL (ML) SUBCUTANEOUS
Refills: 0 | Status: DISCONTINUED | OUTPATIENT
Start: 2024-01-04 | End: 2024-01-04

## 2024-01-04 RX ORDER — INSULIN LISPRO 100/ML
17 VIAL (ML) SUBCUTANEOUS
Refills: 0 | Status: DISCONTINUED | OUTPATIENT
Start: 2024-01-05 | End: 2024-01-09

## 2024-01-04 RX ORDER — HEPARIN SODIUM 5000 [USP'U]/ML
5000 INJECTION INTRAVENOUS; SUBCUTANEOUS EVERY 8 HOURS
Refills: 0 | Status: DISCONTINUED | OUTPATIENT
Start: 2024-01-04 | End: 2024-01-08

## 2024-01-04 RX ORDER — HYDROCHLOROTHIAZIDE 25 MG/1
25 TABLET ORAL
Refills: 0 | Status: DISCONTINUED | COMMUNITY
End: 2024-01-04

## 2024-01-04 RX ORDER — INSULIN LISPRO 100/ML
17 VIAL (ML) SUBCUTANEOUS
Refills: 0 | Status: DISCONTINUED | OUTPATIENT
Start: 2024-01-04 | End: 2024-01-09

## 2024-01-04 RX ORDER — LOSARTAN POTASSIUM 25 MG/1
25 TABLET, FILM COATED ORAL
Refills: 0 | Status: DISCONTINUED | COMMUNITY
End: 2024-01-04

## 2024-01-04 RX ORDER — INSULIN GLARGINE 100 [IU]/ML
35 INJECTION, SOLUTION SUBCUTANEOUS
Refills: 0 | Status: DISCONTINUED | OUTPATIENT
Start: 2024-01-04 | End: 2024-01-05

## 2024-01-04 RX ORDER — MONTELUKAST 10 MG/1
10 TABLET, FILM COATED ORAL DAILY
Qty: 90 | Refills: 3 | Status: DISCONTINUED | COMMUNITY
Start: 1900-01-01 | End: 2024-01-04

## 2024-01-04 RX ORDER — TACROLIMUS 5 MG/1
5.5 CAPSULE ORAL
Refills: 0 | Status: DISCONTINUED | OUTPATIENT
Start: 2024-01-04 | End: 2024-01-05

## 2024-01-04 RX ORDER — CLOPIDOGREL 75 MG/1
75 TABLET, FILM COATED ORAL
Refills: 0 | Status: DISCONTINUED | COMMUNITY
End: 2024-01-04

## 2024-01-04 RX ORDER — ISOPROPYL ALCOHOL 0.7 ML/ML
SWAB TOPICAL
Qty: 1 | Refills: 5 | Status: ACTIVE | COMMUNITY
Start: 2024-01-04 | End: 1900-01-01

## 2024-01-04 RX ORDER — FLUTICASONE PROPIONATE 44 UG/1
44 AEROSOL, METERED RESPIRATORY (INHALATION)
Qty: 1 | Refills: 5 | Status: DISCONTINUED | COMMUNITY
Start: 2023-03-14 | End: 2024-01-04

## 2024-01-04 RX ORDER — CARVEDILOL 25 MG/1
25 TABLET, FILM COATED ORAL
Refills: 0 | Status: DISCONTINUED | COMMUNITY
End: 2024-01-04

## 2024-01-04 RX ORDER — INSULIN LISPRO 100/ML
20 VIAL (ML) SUBCUTANEOUS
Refills: 0 | Status: DISCONTINUED | OUTPATIENT
Start: 2024-01-05 | End: 2024-01-09

## 2024-01-04 RX ADMIN — HEPARIN SODIUM 5000 UNIT(S): 5000 INJECTION INTRAVENOUS; SUBCUTANEOUS at 21:33

## 2024-01-04 RX ADMIN — TACROLIMUS 5 MILLIGRAM(S): 5 CAPSULE ORAL at 08:05

## 2024-01-04 RX ADMIN — Medication 25 MILLIGRAM(S): at 21:33

## 2024-01-04 RX ADMIN — Medication 15 MILLIGRAM(S): at 08:16

## 2024-01-04 RX ADMIN — Medication 4: at 12:19

## 2024-01-04 RX ADMIN — Medication 15 MILLIGRAM(S): at 20:02

## 2024-01-04 RX ADMIN — Medication 650 MILLIGRAM(S): at 06:46

## 2024-01-04 RX ADMIN — BUDESONIDE AND FORMOTEROL FUMARATE DIHYDRATE 1 PUFF(S): 160; 4.5 AEROSOL RESPIRATORY (INHALATION) at 08:08

## 2024-01-04 RX ADMIN — VALGANCICLOVIR 450 MILLIGRAM(S): 450 TABLET, FILM COATED ORAL at 08:04

## 2024-01-04 RX ADMIN — SODIUM CHLORIDE 30 MILLILITER(S): 5 INJECTION, SOLUTION INTRAVENOUS at 15:36

## 2024-01-04 RX ADMIN — CHLORHEXIDINE GLUCONATE 1 APPLICATION(S): 213 SOLUTION TOPICAL at 07:56

## 2024-01-04 RX ADMIN — Medication 4: at 18:11

## 2024-01-04 RX ADMIN — PANTOPRAZOLE SODIUM 40 MILLIGRAM(S): 20 TABLET, DELAYED RELEASE ORAL at 08:17

## 2024-01-04 RX ADMIN — Medication 50 MILLIGRAM(S): at 05:46

## 2024-01-04 RX ADMIN — MYCOPHENOLATE MOFETIL 1000 MILLIGRAM(S): 250 CAPSULE ORAL at 08:06

## 2024-01-04 RX ADMIN — Medication 60 MILLIGRAM(S): at 05:46

## 2024-01-04 RX ADMIN — Medication 100 MILLIGRAM(S): at 05:46

## 2024-01-04 RX ADMIN — Medication 6: at 08:08

## 2024-01-04 RX ADMIN — Medication 1 TABLET(S): at 13:01

## 2024-01-04 RX ADMIN — MAGNESIUM OXIDE 400 MG ORAL TABLET 400 MILLIGRAM(S): 241.3 TABLET ORAL at 08:07

## 2024-01-04 RX ADMIN — INSULIN GLARGINE 35 UNIT(S): 100 INJECTION, SOLUTION SUBCUTANEOUS at 16:17

## 2024-01-04 RX ADMIN — Medication 17 UNIT(S): at 18:10

## 2024-01-04 RX ADMIN — FLUCONAZOLE 100 MILLIGRAM(S): 150 TABLET ORAL at 12:54

## 2024-01-04 RX ADMIN — MYCOPHENOLATE MOFETIL 1000 MILLIGRAM(S): 250 CAPSULE ORAL at 20:03

## 2024-01-04 RX ADMIN — BUDESONIDE AND FORMOTEROL FUMARATE DIHYDRATE 1 PUFF(S): 160; 4.5 AEROSOL RESPIRATORY (INHALATION) at 18:11

## 2024-01-04 RX ADMIN — BUMETANIDE 1 MILLIGRAM(S): 0.25 INJECTION INTRAMUSCULAR; INTRAVENOUS at 13:01

## 2024-01-04 RX ADMIN — BUMETANIDE 1 MILLIGRAM(S): 0.25 INJECTION INTRAMUSCULAR; INTRAVENOUS at 15:02

## 2024-01-04 RX ADMIN — HEPARIN SODIUM 5000 UNIT(S): 5000 INJECTION INTRAVENOUS; SUBCUTANEOUS at 15:05

## 2024-01-04 RX ADMIN — Medication 60 MILLIGRAM(S): at 18:28

## 2024-01-04 RX ADMIN — MAGNESIUM OXIDE 400 MG ORAL TABLET 400 MILLIGRAM(S): 241.3 TABLET ORAL at 18:11

## 2024-01-04 RX ADMIN — Medication 650 MILLIGRAM(S): at 05:46

## 2024-01-04 RX ADMIN — Medication 17 UNIT(S): at 08:09

## 2024-01-04 RX ADMIN — BUMETANIDE 1 MILLIGRAM(S): 0.25 INJECTION INTRAMUSCULAR; INTRAVENOUS at 05:45

## 2024-01-04 RX ADMIN — TACROLIMUS 0.5 MILLIGRAM(S): 5 CAPSULE ORAL at 13:01

## 2024-01-04 RX ADMIN — SODIUM CHLORIDE 30 MILLILITER(S): 5 INJECTION, SOLUTION INTRAVENOUS at 20:01

## 2024-01-04 RX ADMIN — MAGNESIUM OXIDE 400 MG ORAL TABLET 400 MILLIGRAM(S): 241.3 TABLET ORAL at 12:55

## 2024-01-04 RX ADMIN — TACROLIMUS 5.5 MILLIGRAM(S): 5 CAPSULE ORAL at 20:03

## 2024-01-04 RX ADMIN — Medication 100 MILLIGRAM(S): at 21:32

## 2024-01-04 RX ADMIN — VALGANCICLOVIR 450 MILLIGRAM(S): 450 TABLET, FILM COATED ORAL at 20:02

## 2024-01-04 RX ADMIN — Medication 17 UNIT(S): at 12:14

## 2024-01-04 RX ADMIN — Medication 100 MILLIGRAM(S): at 13:54

## 2024-01-04 NOTE — PROGRESS NOTE ADULT - ASSESSMENT
57 yo male h/o htn, cad s/p pci, ICH, here with NSTEMI  s/p intubation and cath. now in CCU    NSTEMI  s/p  cath  cath results noted. multi vessel dz., CTSx f/u.    pt with vtach/fib arrest again on 11/8. s/p ACLS and ROSC.   in ccu with iabp   plan for transplant as per HF and transplant team  transplant w/u ongoing.   s/p colonoscopy 11/17. normal  now listed for transplant as of 11/22 12/25: pt s/p heart transplant last night. remains intubated with iabp in CTU.   12/26: pt extubated to high flow this am. IABP currently being removed. pt a0x3.   12/27: pt feels well. walked with PT this am. currently comforable sitting in chair. IABP removed. wean off pressors as able.   12/28-29: no acute events o/n. weaning down ionotropes/pressors as able. immunosuppressives as per transplant team. PT  12/30: feels well. pressors/ionotropes weaned off. removal of chest tubes as able. immunosupressives as per id. PT.  12/31: feels well. tx out of ctu to step down unit. cont care as per ctsx team. PT  1/1: no acute events o/n. to transition off insulin gtt today. PT. d/w family bedside  1/2: no acute events o/n. plan on removal of a line and chest tubes today as per ctsx. dm mngt as per endo  1/3: feels well. s/p cardiac biopsy yesterday. worked with PT today. one CT remaining.   1/4: no acute events o/n. FS improved. mngt of CT as per CTSx       mngt as per CTSx team          Advanced care planning was discussed with patient and family.  Advanced care planning forms were reviewed and discussed as appropriate.  Differential diagnosis and plan of care discussed with patient after the evaluation.   Pain assessed and judicious use of narcotics when appropriate was discussed.  Importance of Fall prevention discussed.  Counseling on Smoking and Alcohol cessation was offered when appropriate.  Counseling on Diet, exercise, and medication compliance was done.       Approx 75 minutes spent. 59 yo male h/o htn, cad s/p pci, ICH, here with NSTEMI  s/p intubation and cath. now in CCU    NSTEMI  s/p  cath  cath results noted. multi vessel dz., CTSx f/u.    pt with vtach/fib arrest again on 11/8. s/p ACLS and ROSC.   in ccu with iabp   plan for transplant as per HF and transplant team  transplant w/u ongoing.   s/p colonoscopy 11/17. normal  now listed for transplant as of 11/22 12/25: pt s/p heart transplant last night. remains intubated with iabp in CTU.   12/26: pt extubated to high flow this am. IABP currently being removed. pt a0x3.   12/27: pt feels well. walked with PT this am. currently comforable sitting in chair. IABP removed. wean off pressors as able.   12/28-29: no acute events o/n. weaning down ionotropes/pressors as able. immunosuppressives as per transplant team. PT  12/30: feels well. pressors/ionotropes weaned off. removal of chest tubes as able. immunosupressives as per id. PT.  12/31: feels well. tx out of ctu to step down unit. cont care as per ctsx team. PT  1/1: no acute events o/n. to transition off insulin gtt today. PT. d/w family bedside  1/2: no acute events o/n. plan on removal of a line and chest tubes today as per ctsx. dm mngt as per endo  1/3: feels well. s/p cardiac biopsy yesterday. worked with PT today. one CT remaining.   1/4: no acute events o/n. FS improved. mngt of CT as per CTSx       mngt as per CTSx team          Advanced care planning was discussed with patient and family.  Advanced care planning forms were reviewed and discussed as appropriate.  Differential diagnosis and plan of care discussed with patient after the evaluation.   Pain assessed and judicious use of narcotics when appropriate was discussed.  Importance of Fall prevention discussed.  Counseling on Smoking and Alcohol cessation was offered when appropriate.  Counseling on Diet, exercise, and medication compliance was done.       Approx 75 minutes spent.

## 2024-01-04 NOTE — BH CONSULTATION LIAISON PROGRESS NOTE - NSBHCONSULTWORKUP_PSY_A_CORE
-- DO NOT REPLY / DO NOT REPLY ALL --  -- Message is from the [facility]--    Order Request  Lab: routine blood     Message / reason: Patient's son would like patient to have blood test done before follow-up appointment with Dr. Aubree Flores on 06/22.     Insurance type:   Payor: [facility] MEDICAID / Plan: [facility] MEDICAID HMO / Product Type: T19 HMO    Preferred Delivery Method   Call when ready for pickup - phone number to notify: [phone]     Caller Information       Type Contact Phone    06/15/2021 04:54 PM CDT Phone (Incoming) ANA BLOUNT (Emergency Contact) [phone]          Alternative phone number: none     Turnaround time given to caller:   \"This message will be sent to [state Provider's name]. The clinical team will fulfill your request as soon as they review your message when the office opens tomorrow.\"  
no

## 2024-01-04 NOTE — PROGRESS NOTE ADULT - SUBJECTIVE AND OBJECTIVE BOX
Seen earlier today     Chief Complaint: Diabetes Mellitus follow up    INTERVAL HX: Tolerating POs, eats full meals. Noted tightly controlled BG at bedtime ( 82) and fasting hyperglycemia ( ). pt denies having snacks between meals or early in am before BG check this am. Noted rquired total 84 units of insulin yesterday. As per team, discharge planning tomorrow.       Review of Systems:  General: As above  GI: No nausea, vomiting  Endocrine: no  S&Sx of hypoglycemia    Allergies    penicillins (Unknown)    Intolerances      MEDICATIONS  (STANDING):  budesonide  80 MICROgram(s)/formoterol 4.5 MICROgram(s) Inhaler 1 Puff(s) Inhalation two times a day  buMETAnide 1 milliGRAM(s) Oral two times a day  buMETAnide Injectable 1 milliGRAM(s) IV Push <User Schedule>  chlorhexidine 4% Liquid 1 Application(s) Topical <User Schedule>  dextrose 5%. 1000 milliLiter(s) (100 mL/Hr) IV Continuous <Continuous>  dextrose 5%. 1000 milliLiter(s) (50 mL/Hr) IV Continuous <Continuous>  dextrose 50% Injectable 50 milliLiter(s) IV Push every 15 minutes  fluconAZOLE   Tablet 100 milliGRAM(s) Oral <User Schedule>  glucagon  Injectable 1 milliGRAM(s) IntraMuscular once  heparin   Injectable 5000 Unit(s) SubCutaneous every 8 hours  hydrALAZINE 100 milliGRAM(s) Oral every 8 hours  insulin glargine Injectable (LANTUS) 35 Unit(s) SubCutaneous <User Schedule>  insulin lispro (ADMELOG) corrective regimen sliding scale   SubCutaneous <User Schedule>  insulin lispro (ADMELOG) corrective regimen sliding scale   SubCutaneous three times a day before meals  insulin lispro Injectable (ADMELOG) 15 Unit(s) SubCutaneous before dinner  insulin regular Infusion 3 Unit(s)/Hr (3 mL/Hr) IV Continuous <Continuous>  lidocaine   4% Patch 3 Patch Transdermal daily  magnesium oxide 400 milliGRAM(s) Oral three times a day with meals  melatonin 5 milliGRAM(s) Oral at bedtime  mycophenolate mofetil 1000 milliGRAM(s) Oral every 12 hours  NIFEdipine XL 60 milliGRAM(s) Oral every 12 hours  OLANZapine 5 milliGRAM(s) Oral at bedtime  pantoprazole    Tablet 40 milliGRAM(s) Oral every 24 hours  polyethylene glycol 3350 17 Gram(s) Oral daily  predniSONE   Tablet 15 milliGRAM(s) Oral every 12 hours  pregabalin 25 milliGRAM(s) Oral at bedtime  senna 2 Tablet(s) Oral at bedtime  sodium chloride 2% . 150 milliLiter(s) (30 mL/Hr) IV Continuous <Continuous>  tacrolimus 5.5 milliGRAM(s) Oral <User Schedule>  trimethoprim   80 mG/sulfamethoxazole 400 mG 1 Tablet(s) Oral daily  valGANciclovir 450 milliGRAM(s) Oral every 12 hours        insulin lispro (ADMELOG) corrective regimen sliding scale   4  SubCutaneous (01-04-24 @ 12:19)   6 Unit(s) SubCutaneous (01-04-24 @ 08:08)    insulin lispro Injectable (ADMELOG)   17 Unit(s) SubCutaneous (01-04-24 @ 12:14)   17 Unit(s) SubCutaneous (01-04-24 @ 08:09)   17 Unit(s) SubCutaneous (01-03-24 @ 18:24)    predniSONE   Tablet   15 milliGRAM(s) Oral (01-04-24 @ 08:16)   15 milliGRAM(s) Oral (01-03-24 @ 19:45)        PHYSICAL EXAM:  VITALS: T(C): 36.5 (01-04-24 @ 11:44)  T(F): 97.7 (01-04-24 @ 11:44), Max: 98.4 (01-03-24 @ 20:30)  HR: 103 (01-04-24 @ 14:50) (95 - 108)  BP: 106/68 (01-04-24 @ 14:50) (106/68 - 125/76)  RR:  (18 - 18)  SpO2:  (95% - 98%)  Wt(kg): --  GENERAL: Male laying in bed, in NAD  Respiratory: Respirations unlabored   Extremities: Warm, no edema  NEURO: Alert , appropriate     LABS:  POCT Blood Glucose.: 214 mg/dL (01-04-24 @ 11:24)  POCT Blood Glucose.: 285 mg/dL (01-04-24 @ 07:55)  POCT Blood Glucose.: 180 mg/dL (01-04-24 @ 02:05)  POCT Blood Glucose.: 82 mg/dL (01-03-24 @ 21:40)  POCT Blood Glucose.: 111 mg/dL (01-03-24 @ 18:01)  POCT Blood Glucose.: 204 mg/dL (01-03-24 @ 11:25)  POCT Blood Glucose.: 173 mg/dL (01-03-24 @ 07:28)  POCT Blood Glucose.: 129 mg/dL (01-03-24 @ 04:40)  POCT Blood Glucose.: 184 mg/dL (01-03-24 @ 04:37)  POCT Blood Glucose.: 99 mg/dL (01-03-24 @ 03:54)  POCT Blood Glucose.: 113 mg/dL (01-03-24 @ 02:57)  POCT Blood Glucose.: 148 mg/dL (01-03-24 @ 02:03)  POCT Blood Glucose.: 175 mg/dL (01-03-24 @ 00:58)  POCT Blood Glucose.: 218 mg/dL (01-02-24 @ 23:53)  POCT Blood Glucose.: 281 mg/dL (01-02-24 @ 22:58)  POCT Blood Glucose.: 393 mg/dL (01-02-24 @ 21:40)  POCT Blood Glucose.: 267 mg/dL (01-02-24 @ 16:43)  POCT Blood Glucose.: 269 mg/dL (01-02-24 @ 11:18)  POCT Blood Glucose.: 205 mg/dL (01-02-24 @ 08:12)  POCT Blood Glucose.: 144 mg/dL (01-02-24 @ 04:16)  POCT Blood Glucose.: 110 mg/dL (01-02-24 @ 02:49)  POCT Blood Glucose.: 93 mg/dL (01-02-24 @ 02:10)  POCT Blood Glucose.: 116 mg/dL (01-02-24 @ 01:17)  POCT Blood Glucose.: 174 mg/dL (01-01-24 @ 23:54)  POCT Blood Glucose.: 174 mg/dL (01-01-24 @ 22:29)  POCT Blood Glucose.: 131 mg/dL (01-01-24 @ 21:42)  POCT Blood Glucose.: 99 mg/dL (01-01-24 @ 21:07)  POCT Blood Glucose.: 182 mg/dL (01-01-24 @ 19:35)  POCT Blood Glucose.: 155 mg/dL (01-01-24 @ 18:33)  POCT Blood Glucose.: 176 mg/dL (01-01-24 @ 17:31)  POCT Blood Glucose.: 229 mg/dL (01-01-24 @ 16:33)  POCT Blood Glucose.: 308 mg/dL (01-01-24 @ 15:33)                          11.6   15.97 )-----------( 216      ( 04 Jan 2024 07:49 )             34.9     01-04    126<L>  |  90<L>  |  62<H>  ----------------------------<  298<H>  4.5   |  21<L>  |  1.22    Ca    9.6      04 Jan 2024 07:50  Phos  3.5     01-04  Mg     2.0     01-04    TPro  6.4  /  Alb  4.0  /  TBili  0.3  /  DBili  x   /  AST  23  /  ALT  55<H>  /  AlkPhos  54  01-04    eGFR: 68 mL/min/1.73m2 (04 Jan 2024 07:50)    11-10 Chol 130 Direct LDL -- LDL calculated 75 HDL 37<L> Trig 95, 11-03 Chol 198 Direct LDL -- LDL calculated 114<H> HDL 24<L> Trig 344<H>  Thyroid Function Tests:      A1C with Estimated Average Glucose Result: 6.1 % (01-02-24 @ 07:26)  A1C with Estimated Average Glucose Result: 8.3 % (11-01-23 @ 06:14)    Estimated Average Glucose: 128 mg/dL (01-02-24 @ 07:26)  Estimated Average Glucose: 192 mg/dL (11-01-23 @ 06:14)        Diet, Consistent Carbohydrate/No Snacks:   1000mL Fluid Restriction ([id number]) (01-04-24 @ 13:57) [Active]             Seen earlier today     Chief Complaint: Diabetes Mellitus follow up    INTERVAL HX: Tolerating POs, eats full meals. Noted tightly controlled BG at bedtime ( 82) and fasting hyperglycemia ( ). pt denies having snacks between meals or early in am before BG check this am. Noted rquired total 84 units of insulin yesterday. As per team, discharge planning tomorrow.       Review of Systems:  General: As above  GI: No nausea, vomiting  Endocrine: no  S&Sx of hypoglycemia    Allergies    penicillins (Unknown)    Intolerances      MEDICATIONS  (STANDING):  budesonide  80 MICROgram(s)/formoterol 4.5 MICROgram(s) Inhaler 1 Puff(s) Inhalation two times a day  buMETAnide 1 milliGRAM(s) Oral two times a day  buMETAnide Injectable 1 milliGRAM(s) IV Push <User Schedule>  chlorhexidine 4% Liquid 1 Application(s) Topical <User Schedule>  dextrose 5%. 1000 milliLiter(s) (100 mL/Hr) IV Continuous <Continuous>  dextrose 5%. 1000 milliLiter(s) (50 mL/Hr) IV Continuous <Continuous>  dextrose 50% Injectable 50 milliLiter(s) IV Push every 15 minutes  fluconAZOLE   Tablet 100 milliGRAM(s) Oral <User Schedule>  glucagon  Injectable 1 milliGRAM(s) IntraMuscular once  heparin   Injectable 5000 Unit(s) SubCutaneous every 8 hours  hydrALAZINE 100 milliGRAM(s) Oral every 8 hours  insulin glargine Injectable (LANTUS) 35 Unit(s) SubCutaneous <User Schedule>  insulin lispro (ADMELOG) corrective regimen sliding scale   SubCutaneous <User Schedule>  insulin lispro (ADMELOG) corrective regimen sliding scale   SubCutaneous three times a day before meals  insulin lispro Injectable (ADMELOG) 15 Unit(s) SubCutaneous before dinner  insulin regular Infusion 3 Unit(s)/Hr (3 mL/Hr) IV Continuous <Continuous>  lidocaine   4% Patch 3 Patch Transdermal daily  magnesium oxide 400 milliGRAM(s) Oral three times a day with meals  melatonin 5 milliGRAM(s) Oral at bedtime  mycophenolate mofetil 1000 milliGRAM(s) Oral every 12 hours  NIFEdipine XL 60 milliGRAM(s) Oral every 12 hours  OLANZapine 5 milliGRAM(s) Oral at bedtime  pantoprazole    Tablet 40 milliGRAM(s) Oral every 24 hours  polyethylene glycol 3350 17 Gram(s) Oral daily  predniSONE   Tablet 15 milliGRAM(s) Oral every 12 hours  pregabalin 25 milliGRAM(s) Oral at bedtime  senna 2 Tablet(s) Oral at bedtime  sodium chloride 2% . 150 milliLiter(s) (30 mL/Hr) IV Continuous <Continuous>  tacrolimus 5.5 milliGRAM(s) Oral <User Schedule>  trimethoprim   80 mG/sulfamethoxazole 400 mG 1 Tablet(s) Oral daily  valGANciclovir 450 milliGRAM(s) Oral every 12 hours        insulin lispro (ADMELOG) corrective regimen sliding scale   4  SubCutaneous (01-04-24 @ 12:19)   6 Unit(s) SubCutaneous (01-04-24 @ 08:08)    insulin lispro Injectable (ADMELOG)   17 Unit(s) SubCutaneous (01-04-24 @ 12:14)   17 Unit(s) SubCutaneous (01-04-24 @ 08:09)   17 Unit(s) SubCutaneous (01-03-24 @ 18:24)    predniSONE   Tablet   15 milliGRAM(s) Oral (01-04-24 @ 08:16)   15 milliGRAM(s) Oral (01-03-24 @ 19:45)        PHYSICAL EXAM:  VITALS: T(C): 36.5 (01-04-24 @ 11:44)  T(F): 97.7 (01-04-24 @ 11:44), Max: 98.4 (01-03-24 @ 20:30)  HR: 103 (01-04-24 @ 14:50) (95 - 108)  BP: 106/68 (01-04-24 @ 14:50) (106/68 - 125/76)  RR:  (18 - 18)  SpO2:  (95% - 98%)  Wt(kg): --  GENERAL: Male laying in bed, in NAD  Respiratory: Respirations unlabored   Extremities: Warm, no edema  NEURO: Alert , appropriate     LABS:  POCT Blood Glucose.: 214 mg/dL (01-04-24 @ 11:24)  POCT Blood Glucose.: 285 mg/dL (01-04-24 @ 07:55)  POCT Blood Glucose.: 180 mg/dL (01-04-24 @ 02:05)  POCT Blood Glucose.: 82 mg/dL (01-03-24 @ 21:40)  POCT Blood Glucose.: 111 mg/dL (01-03-24 @ 18:01)  POCT Blood Glucose.: 204 mg/dL (01-03-24 @ 11:25)  POCT Blood Glucose.: 173 mg/dL (01-03-24 @ 07:28)  POCT Blood Glucose.: 129 mg/dL (01-03-24 @ 04:40)  POCT Blood Glucose.: 184 mg/dL (01-03-24 @ 04:37)  POCT Blood Glucose.: 99 mg/dL (01-03-24 @ 03:54)  POCT Blood Glucose.: 113 mg/dL (01-03-24 @ 02:57)  POCT Blood Glucose.: 148 mg/dL (01-03-24 @ 02:03)  POCT Blood Glucose.: 175 mg/dL (01-03-24 @ 00:58)  POCT Blood Glucose.: 218 mg/dL (01-02-24 @ 23:53)  POCT Blood Glucose.: 281 mg/dL (01-02-24 @ 22:58)  POCT Blood Glucose.: 393 mg/dL (01-02-24 @ 21:40)  POCT Blood Glucose.: 267 mg/dL (01-02-24 @ 16:43)  POCT Blood Glucose.: 269 mg/dL (01-02-24 @ 11:18)  POCT Blood Glucose.: 205 mg/dL (01-02-24 @ 08:12)  POCT Blood Glucose.: 144 mg/dL (01-02-24 @ 04:16)  POCT Blood Glucose.: 110 mg/dL (01-02-24 @ 02:49)  POCT Blood Glucose.: 93 mg/dL (01-02-24 @ 02:10)  POCT Blood Glucose.: 116 mg/dL (01-02-24 @ 01:17)  POCT Blood Glucose.: 174 mg/dL (01-01-24 @ 23:54)  POCT Blood Glucose.: 174 mg/dL (01-01-24 @ 22:29)  POCT Blood Glucose.: 131 mg/dL (01-01-24 @ 21:42)  POCT Blood Glucose.: 99 mg/dL (01-01-24 @ 21:07)  POCT Blood Glucose.: 182 mg/dL (01-01-24 @ 19:35)  POCT Blood Glucose.: 155 mg/dL (01-01-24 @ 18:33)  POCT Blood Glucose.: 176 mg/dL (01-01-24 @ 17:31)  POCT Blood Glucose.: 229 mg/dL (01-01-24 @ 16:33)  POCT Blood Glucose.: 308 mg/dL (01-01-24 @ 15:33)                          11.6   15.97 )-----------( 216      ( 04 Jan 2024 07:49 )             34.9     01-04    126<L>  |  90<L>  |  62<H>  ----------------------------<  298<H>  4.5   |  21<L>  |  1.22    Ca    9.6      04 Jan 2024 07:50  Phos  3.5     01-04  Mg     2.0     01-04    TPro  6.4  /  Alb  4.0  /  TBili  0.3  /  DBili  x   /  AST  23  /  ALT  55<H>  /  AlkPhos  54  01-04    eGFR: 68 mL/min/1.73m2 (04 Jan 2024 07:50)    11-10 Chol 130 Direct LDL -- LDL calculated 75 HDL 37<L> Trig 95, 11-03 Chol 198 Direct LDL -- LDL calculated 114<H> HDL 24<L> Trig 344<H>  Thyroid Function Tests:      A1C with Estimated Average Glucose Result: 6.1 % (01-02-24 @ 07:26)  A1C with Estimated Average Glucose Result: 8.3 % (11-01-23 @ 06:14)    Estimated Average Glucose: 128 mg/dL (01-02-24 @ 07:26)  Estimated Average Glucose: 192 mg/dL (11-01-23 @ 06:14)        Diet, Consistent Carbohydrate/No Snacks:   1000mL Fluid Restriction ([id number]) (01-04-24 @ 13:57) [Active]

## 2024-01-04 NOTE — PROGRESS NOTE ADULT - ASSESSMENT
59 yo M with PMHx of HTN, CAD w/ 1 stent in 2009, ICH (2008) presented on 11/1 with abn ekg. Patient presented to [facility] where he was found to have STEMI, recommended to get cath however patient did not want to get it there so he left and came here.   EKG here with ST depression in lateral leads and elevation in anterior leads   Prior to C found to be tachycardic, dyspneic, intubated   [facility] 11/1 with chronic total occlusion of LAD and RCA, with elevated RA and PA pressures  TTE 11/1 with severely decreased EF 32%, s/p IABP 11/1        #s/p OHT 12/25/23 CMV D-/R+; Toxo D-/R-  Induction simulect and MPN  Maintenance tacro/MMF  ·  Plan: - CMV -/+: intermediate risk.  Will need to start on valganciclovir when able X 6 months.    - Toxo -/-: none required  - PJP ppx: will introduce eventually   - Trush ppx: eventual Nystatin.  - donor HCV TIM( -) but HCV antibody +.  -     #pretransplant E faecalis bacteremia in c/o colonoscopy  completed therapy    leukocytosis:  would send blood cultures x2 sets  would pan scan with Chest /abd/pelvis CT scan  repeat RVP swab tonight or tomorrow  check CMV viral load            Chao Peters MD  Can be called via Teams  After 5pm/weekends [phone]     59 yo M with PMHx of HTN, CAD w/ 1 stent in 2009, ICH (2008) presented on 11/1 with abn ekg. Patient presented to [facility] where he was found to have STEMI, recommended to get cath however patient did not want to get it there so he left and came here.   EKG here with ST depression in lateral leads and elevation in anterior leads   Prior to C found to be tachycardic, dyspneic, intubated   [facility] 11/1 with chronic total occlusion of LAD and RCA, with elevated RA and PA pressures  TTE 11/1 with severely decreased EF 32%, s/p IABP 11/1        #s/p OHT 12/25/23 CMV D-/R+; Toxo D-/R-  Induction simulect and MPN  Maintenance tacro/MMF  ·  Plan: - CMV -/+: intermediate risk.  Will need to start on valganciclovir when able X 6 months.    - Toxo -/-: none required  - PJP ppx: will introduce eventually   - Trush ppx: eventual Nystatin.  - donor HCV TIM( -) but HCV antibody +.  -     #pretransplant E faecalis bacteremia in c/o colonoscopy  completed therapy    leukocytosis:  would send blood cultures x2 sets  would pan scan with Chest /abd/pelvis CT scan  repeat RVP swab tonight or tomorrow  check CMV viral load            Chao Peters MD  Can be called via Teams  After 5pm/weekends [phone]

## 2024-01-04 NOTE — BH CONSULTATION LIAISON PROGRESS NOTE - NSICDXBHSECONDARYDX_PSY_ALL_CORE
Cardiogenic shock   R57.0  CAD (coronary artery disease)   I25.10  Respiratory arrest   R09.2  Leukocytosis   D72.829  Bacteremia   R78.81  Status post heart transplant   Z94.1  
Cardiogenic shock   R57.0  CAD (coronary artery disease)   I25.10  Respiratory arrest   R09.2  Leukocytosis   D72.829  Bacteremia   R78.81  
Cardiogenic shock   R57.0  CAD (coronary artery disease)   I25.10  Respiratory arrest   R09.2  Leukocytosis   D72.829  Bacteremia   R78.81  Status post heart transplant   Z94.1

## 2024-01-04 NOTE — BH CONSULTATION LIAISON PROGRESS NOTE - NSBHCHARTREVIEWVS_PSY_A_CORE FT
Vital Signs Last 24 Hrs  T(C): 36.4 (04 Jan 2024 06:06), Max: 36.9 (03 Jan 2024 20:30)  T(F): 97.5 (04 Jan 2024 06:06), Max: 98.4 (03 Jan 2024 20:30)  HR: 96 (04 Jan 2024 06:06) (96 - 122)  BP: 107/72 (04 Jan 2024 06:06) (104/72 - 125/76)  BP(mean): 94 (03 Jan 2024 20:30) (82 - 94)  RR: 18 (04 Jan 2024 06:06) (18 - 18)  SpO2: 95% (04 Jan 2024 06:06) (95% - 100%)    Parameters below as of 04 Jan 2024 06:06  Patient On (Oxygen Delivery Method): room air    
Vital Signs Last 24 Hrs  T(C): 36.8 (20 Nov 2023 08:00), Max: 37.1 (20 Nov 2023 00:00)  T(F): 98.2 (20 Nov 2023 08:00), Max: 98.8 (20 Nov 2023 00:00)  HR: 77 (20 Nov 2023 11:00) (69 - 83)  BP: 104/65 (20 Nov 2023 08:00) (104/65 - 104/65)  BP(mean): 77 (20 Nov 2023 08:00) (77 - 77)  RR: 19 (20 Nov 2023 11:00) (16 - 25)  SpO2: 97% (20 Nov 2023 11:00) (94% - 98%)    Parameters below as of 20 Nov 2023 10:06  Patient On (Oxygen Delivery Method): room air    
Vital Signs Last 24 Hrs  T(C): 36.3 (03 Jan 2024 11:19), Max: 37 (03 Jan 2024 04:00)  T(F): 97.4 (03 Jan 2024 11:19), Max: 98.6 (03 Jan 2024 04:00)  HR: 97 (03 Jan 2024 11:19) (97 - 109)  BP: 106/71 (03 Jan 2024 11:19) (97/66 - 127/74)  BP(mean): 77 (03 Jan 2024 09:03) (77 - 93)  RR: 18 (03 Jan 2024 11:19) (18 - 18)  SpO2: 98% (03 Jan 2024 04:00) (96% - 98%)    Parameters below as of 03 Jan 2024 11:19  Patient On (Oxygen Delivery Method): room air

## 2024-01-04 NOTE — PROGRESS NOTE ADULT - ASSESSMENT
57yo M w/ PMHx HTN, CAD s/p stent (2009), ICH (2008), ICM now s/p heart transplant on 12/25/2023. Endocrine consulted for T2DM management.  Tolerating POs with good oral intake, eats foods from home as well. pt was educated on consistent carbohydrate diet and advised to limit snacks between meals. s/p Methylprednisolone taper, now on Prednisone 15 mg BID started yesterday PM.     Steroid schedule  12/28 Methylprednisolone 32 units BID   12/29 Methylprednisolone 28 units BID  12/30 Methylprednisolone 24 units BID   12/31 Methylprednisolone 20 units BID   1/1 Methylprednisolone 16 units BID   1/2 Methylprednisolone 12 units BID   1/3 Prednisone 15 mg BID ( started on 1/3, PM)     #T2DM (A1c  8.3%), uncontrolled with complications   #Steroid induced hyperglycemia   Home meds: confirmed with patient on 12/28 patient takes dapagliflozin 10mg daily, patient is NOT taking ozempic any longer (has not taken in over 4 months)    Fasting hyperglycemia ( 285) and tightly controlled BG at bedtime yesterday. Total insulin requirement yesterday 84 units.    Steroid - currently on prednisone 15 mg BiD  Recommendations:   - Test BGs ACTID, at HS and 2 am   - Increase Lantus to 35 units today. (Receiving Lantus at 12 noon after transition. will retime lantus dose to 4 pm today and to bedtime tomorrow )  - Adjust premeal Admelog 20-17-15 ( Hold if NPO)   - Continue Moderate correction scale ACTID and sperate Moderate correction scale at HS and 2 am   - recommend Nutrition consult   - Please  keep hypoglycemia protocol in place   - Carb consistent diet   - please inform endocrine if any changes to steroid taper as this will impact insulin requirements   - BG goal 100 to 180mg/dl   - Please educate pt on insulin pen use and glucose check    - DISCHARGE RECOMMENDATIONS:   Discharge planning on basal and bolus regimen, dose TBD based on insulin requirements and steroid regimen  Please Send Rx for Basal insulin pen ( Basaglar/ Lantus/ Tresiba), bolus insulin pen( Admelog/ Humalog/ Novolog) and pen needles   pt should monitor BGs ACTID and at HS and contact provider if BG< 70, > 400, or remain > 200   Please send Rx for Glucometer, lancets, strips and alcohol pads  Please arrange home care as he is new to insulin injections  - OUT-PATIENT FOLLOW UP: Patient should follow up with endocrinologist.   Can follow up with [facility] - [address], [address]. [place] [address]. Tel) [phone]   Email sent to  today     #HLD  - patient not on statin  - goal LDL in diabetes mellitus is <70  - outpatient fasting lipid profile     #HTN  - patient on losartan 25mg daily at home  - goal BP in diabetes mellitus is <130/80  - defer to primary team for management    Jessi Jorgensen NPSaeidC   Contact via Microsoft Teams during business hours  To reach covering provider access AMION via sunrise tools  For Urgent matters/after-hours/weekends/holidays please page endocrine fellow on call   For nonurgent matters please email [email or web address]    Please note that this patient may be followed by different provider tomorrow.  Notify endocrine 24 hours prior to discharge for final recommendations 59yo M w/ PMHx HTN, CAD s/p stent (2009), ICH (2008), ICM now s/p heart transplant on 12/25/2023. Endocrine consulted for T2DM management.  Tolerating POs with good oral intake, eats foods from home as well. pt was educated on consistent carbohydrate diet and advised to limit snacks between meals. s/p Methylprednisolone taper, now on Prednisone 15 mg BID started yesterday PM.     Steroid schedule  12/28 Methylprednisolone 32 units BID   12/29 Methylprednisolone 28 units BID  12/30 Methylprednisolone 24 units BID   12/31 Methylprednisolone 20 units BID   1/1 Methylprednisolone 16 units BID   1/2 Methylprednisolone 12 units BID   1/3 Prednisone 15 mg BID ( started on 1/3, PM)     #T2DM (A1c  8.3%), uncontrolled with complications   #Steroid induced hyperglycemia   Home meds: confirmed with patient on 12/28 patient takes dapagliflozin 10mg daily, patient is NOT taking ozempic any longer (has not taken in over 4 months)    Fasting hyperglycemia ( 285) and tightly controlled BG at bedtime yesterday. Total insulin requirement yesterday 84 units.    Steroid - currently on prednisone 15 mg BiD  Recommendations:   - Test BGs ACTID, at HS and 2 am   - Increase Lantus to 35 units today. (Receiving Lantus at 12 noon after transition. will retime lantus dose to 4 pm today and to bedtime tomorrow )  - Adjust premeal Admelog 20-17-15 ( Hold if NPO)   - Continue Moderate correction scale ACTID and sperate Moderate correction scale at HS and 2 am   - recommend Nutrition consult   - Please  keep hypoglycemia protocol in place   - Carb consistent diet   - please inform endocrine if any changes to steroid taper as this will impact insulin requirements   - BG goal 100 to 180mg/dl   - Please educate pt on insulin pen use and glucose check    - DISCHARGE RECOMMENDATIONS:   Discharge planning on basal and bolus regimen, dose TBD based on insulin requirements and steroid regimen  Please Send Rx for Basal insulin pen ( Basaglar/ Lantus/ Tresiba), bolus insulin pen( Admelog/ Humalog/ Novolog) and pen needles   pt should monitor BGs ACTID and at HS and contact provider if BG< 70, > 400, or remain > 200   Please send Rx for Glucometer, lancets, strips and alcohol pads  Please arrange home care as he is new to insulin injections  - OUT-PATIENT FOLLOW UP: Patient should follow up with endocrinologist.   Can follow up with [facility] - [address], [address]. [place] [address]. Tel) [phone]   Email sent to  today     #HLD  - patient not on statin  - goal LDL in diabetes mellitus is <70  - outpatient fasting lipid profile     #HTN  - patient on losartan 25mg daily at home  - goal BP in diabetes mellitus is <130/80  - defer to primary team for management    Jessi Jorgensen NPSaeidC   Contact via Microsoft Teams during business hours  To reach covering provider access AMION via sunrise tools  For Urgent matters/after-hours/weekends/holidays please page endocrine fellow on call   For nonurgent matters please email [email or web address]    Please note that this patient may be followed by different provider tomorrow.  Notify endocrine 24 hours prior to discharge for final recommendations

## 2024-01-04 NOTE — PROGRESS NOTE ADULT - SUBJECTIVE AND OBJECTIVE BOX
VITAL SIGNS    Telemetry:  nsr 88    Vital Signs Last 24 Hrs  T(C): 36.4 (24 @ 06:06), Max: 36.9 (24 @ 20:30)  T(F): 97.5 (24 @ 06:06), Max: 98.4 (24 @ 20:30)  HR: 96 (24 @ 06:06) (96 - 122)  BP: 107/72 (24 @ 06:06) (97/66 - 125/76)  RR: 18 (24 @ 06:06) (18 - 18)  SpO2: 95% (24 @ 06:06) (95% - 100%)                    @ 07:01  -   @ 07:00  --------------------------------------------------------  IN: 1380 mL / OUT: 2755 mL / NET: -1375 mL          Daily     Daily Weight in k.1 (2024 08:54)            CAPILLARY BLOOD GLUCOSE      POCT Blood Glucose.: 180 mg/dL (2024 02:05)  POCT Blood Glucose.: 82 mg/dL (2024 21:40)  POCT Blood Glucose.: 111 mg/dL (2024 18:01)  POCT Blood Glucose.: 204 mg/dL (2024 11:25)            Drains:     MS         [ x ] Drainage:               Pacing Wires            Coumadin    [ ] YES          [ x ]      NO                                   PHYSICAL EXAM    Neurology: alert and oriented x 3, nonfocal, no gross deficits  CV : s1 s2 RRR  Sternal Wound :  CDI , Stable  Lungs: cta  Abdomen: soft, nontender, nondistended, positive bowel sounds, last bowel movement                       chest tubes x 1  :    voiding         Extremities:      -edema   /  -   calve tenderness ,                acetaminophen     Tablet .. 650 milliGRAM(s) Oral every 6 hours  budesonide  80 MICROgram(s)/formoterol 4.5 MICROgram(s) Inhaler 1 Puff(s) Inhalation two times a day  buMETAnide Injectable 1 milliGRAM(s) IV Push <User Schedule>  chlorhexidine 4% Liquid 1 Application(s) Topical <User Schedule>  dextrose 5%. 1000 milliLiter(s) IV Continuous <Continuous>  dextrose 5%. 1000 milliLiter(s) IV Continuous <Continuous>  dextrose 50% Injectable 50 milliLiter(s) IV Push every 15 minutes  dextrose Oral Gel 15 Gram(s) Oral once PRN  fluconAZOLE   Tablet 100 milliGRAM(s) Oral <User Schedule>  glucagon  Injectable 1 milliGRAM(s) IntraMuscular once  hydrALAZINE 100 milliGRAM(s) Oral every 8 hours  insulin glargine Injectable (LANTUS) 30 Unit(s) SubCutaneous <User Schedule>  insulin lispro (ADMELOG) corrective regimen sliding scale   SubCutaneous three times a day before meals  insulin lispro (ADMELOG) corrective regimen sliding scale   SubCutaneous <User Schedule>  insulin lispro Injectable (ADMELOG) 17 Unit(s) SubCutaneous three times a day before meals  insulin regular Infusion 3 Unit(s)/Hr IV Continuous <Continuous>  lidocaine   4% Patch 3 Patch Transdermal daily  magnesium oxide 400 milliGRAM(s) Oral three times a day with meals  melatonin 5 milliGRAM(s) Oral at bedtime  mycophenolate mofetil 1000 milliGRAM(s) Oral every 12 hours  naloxegol 25 milliGRAM(s) Oral daily  NIFEdipine XL 60 milliGRAM(s) Oral every 12 hours  OLANZapine 5 milliGRAM(s) Oral at bedtime  pantoprazole    Tablet 40 milliGRAM(s) Oral every 24 hours  polyethylene glycol 3350 17 Gram(s) Oral daily  predniSONE   Tablet 15 milliGRAM(s) Oral every 12 hours  pregabalin 50 milliGRAM(s) Oral every 8 hours  senna 2 Tablet(s) Oral at bedtime  tacrolimus 5 milliGRAM(s) Oral <User Schedule>  trimethoprim   80 mG/sulfamethoxazole 400 mG 1 Tablet(s) Oral daily  valGANciclovir 450 milliGRAM(s) Oral every 12 hours                    Physical Therapy Rec:   Home  [  ]   Home w/ PT  [  ]  Rehab  [  ]  Discussed with Cardiothoracic Team at AM rounds.

## 2024-01-04 NOTE — PROGRESS NOTE ADULT - PROBLEM SELECTOR PLAN 1
- s/p OHT on 12/25. Ischemic Time: 253min  - IABP removed 12/26  - continue with Procardia XL 60 mg BID and hydralazine 100 mg PO TID  - continue with Bumex 1 mg IV TID. S/p hypertonic saline on 1/2  - underwent RHC/EMBx on 1/2, Grade 0R  - Please keep primary Dr. Banuelos [phone] in the loop per family request. - s/p OHT on 12/25. Ischemic Time: 253min  - IABP removed 12/26  - continue with Procardia XL 60 mg BID and hydralazine 100 mg PO TID  - continue with Bumex 1 mg IV TID. S/p hypertonic saline on 1/2  - underwent RHC/EMBx on 1/2, Grade 0R  - Please keep primary Dr. Banuelos [phone] in the loop per family request. - s/p OHT on 12/25. Ischemic Time: 253min  - IABP removed 12/26  - continue with Procardia XL 60 mg BID and hydralazine 100 mg PO TID  - make Bumex Bumex 1 mg PO BID. S/p hypertonic saline on 1/2, plan to receive additional dose x 2 today   - underwent RHC/EMBx on 1/2, Grade 0R  - Please keep primary Dr. Banuelos [phone] in the loop per family request. - s/p OHT on 12/25. Ischemic Time: 253min  - IABP removed 12/26  - continue with Procardia XL 60 mg BID and hydralazine 100 mg PO TID  - make Bumex Bumex 1 mg PO BID. S/p hypertonic saline on 1/2, plan to receive additional dose x 2 today   - underwent RHC/EMBx on 1/2, Grade 0R  - Please keep primary Dr. Banuelos [phone] in the loop per family request.

## 2024-01-04 NOTE — BH CONSULTATION LIAISON PROGRESS NOTE - NSBHFUPINTERVALHXFT_PSY_A_CORE
Mr. Hardy is a 58 year-old man with no prior psychiatric h/o and past medical history of HTN, CAD (1 stent in 2009), ICH (2008) presented on 11/1 with abnormal EKG. Patient admitted to [facility] for NSTEMI, s/p intubation and catheterization. Patient is now s/p OHT on 12/25.  Psychiatry follow up was requested as the primary team was concerned about some behavioral/affective disturbances.      Patient seen, A & O X 3, calm, pleasant on approach. He reported that he is doing well. Reported that he no longer feels anxious, had one bad night when he had issues with the staff. He appeared euthymic, denied feeling depressed. He received Zyprexa last night, reported that he didn't know that he received it but did notice feeling sedated/loopy.  (Patient had actually received 5 mg instead of recommended 2.5 mg). Denied having any perceptual disturbances including AH, VH, paranoia. Denied SI/HI/I/P.  Patient reported that he is not interested in being on long term psychotropics. Psychoeducation was provided to the patient again about the implications of steroid use on mood including anxiety, insomnia, irritability, even psychosis/ceasar. Explained that some of the recent emotional disturbances could be explained by his current steroid use. Patient was receptive to the information provided and agreed to start a low dose of Zyprexa at night, risks vs benefits explained.    Mr. Hardy is a 58 year-old man with no prior psychiatric h/o and past medical history of HTN, CAD (1 stent in 2009), ICH (2008) presented on 11/1 with abnormal EKG. Patient admitted to [facility] for NSTEMI, s/p intubation and catheterization. Patient is now s/p OHT on 12/25.  Psychiatry follow up was requested as the primary team was concerned about some behavioral/affective disturbances.      Patient seen, A & O X 3, calm, pleasant on approach. He reported that he is doing well. Reported that he no longer feels anxious, had one bad night when he had issues with the staff. He appeared euthymic, denied feeling depressed. He received Zyprexa last night, reported that he didn't know that he received it but did notice feeling sedated/loopy.  (Patient had actually received 5 mg instead of recommended 2.5 mg). Denied having any perceptual disturbances including AH, VH, paranoia. Denied SI/HI/I/P.  Patient reported that he is not interested in being on long term psychotropics. Psychoeducation was provided to the patient again about the implications of steroid use on mood including anxiety, insomnia, irritability, even psychosis/ceasar. Explained that some of the recent emotional disturbances could be explained by his current steroid use. Patient was receptive to the information provided and agreed to start a low dose of Zyprexa at night, risks vs benefits explained.    Mr. Hardy is a 58 year-old man with no prior psychiatric h/o and past medical history of HTN, CAD (1 stent in 2009), ICH (2008) presented on 11/1 with abnormal EKG. Patient admitted to [facility] for NSTEMI, s/p intubation and catheterization. Patient is now s/p OHT on 12/25.  Psychiatry follow up was requested as the primary team was concerned about some behavioral/affective disturbances.      Patient seen, A & O X 3, calm, pleasant on approach. He reported that he is doing well. Reported that he no longer feels anxious, had one bad night when he had issues with the staff. He appeared euthymic, denied feeling depressed. He received Zyprexa last night, reported that he didn't know that he received it but did notice feeling sedated/loopy.  (Patient had actually received 5 mg instead of recommended 2.5 mg). Denied having any perceptual disturbances including AH, VH, paranoia. Denied SI/HI/I/P.  Patient reported that he is not interested in being on long term psychotropics. Psychoeducation was provided again to the patient again about the implications of steroid use on mood including anxiety, insomnia, irritability, even psychosis/ceasar. Explained that some of the recent emotional disturbances could be explained by his current steroid use, and that the current medication being prescribed will be used for short term management. Patient was receptive to the information provided.     Mr. Hardy is a 58 year-old man with no prior psychiatric h/o and past medical history of HTN, CAD (1 stent in 2009), ICH (2008) presented on 11/1 with abnormal EKG. Patient admitted to [facility] for NSTEMI, s/p intubation and catheterization. Patient is now s/p OHT on 12/25.  Psychiatry follow up was requested as the primary team was concerned about some behavioral/affective disturbances.      Patient seen, A & O X 3, calm, pleasant on approach. He reported that he is doing well. Reported that he no longer feels anxious, had one bad night when he had issues with the staff. He appeared euthymic, denied feeling depressed. He received Zyprexa last night, reported that he didn't know that he received it but did notice feeling sedated/loopy.  (Patient had actually received 5 mg instead of recommended 2.5 mg). Denied having any perceptual disturbances including AH, VH, paranoia. Denied SI/HI/I/P.  Patient reported that he is not interested in being on long term psychotropics. Psychoeducation was provided again to the patient again about the implications of steroid use on mood including anxiety, insomnia, irritability, even psychosis/ceasar. Explained that some of the recent emotional disturbances could be explained by his current steroid use, and that the current medication being prescribed will be used for short term management. Patient was receptive to the information provided.

## 2024-01-04 NOTE — PROGRESS NOTE ADULT - ASSESSMENT
58M : HFrEF (LVIDd 6.4 cm, LVEF 32%), PCI ('08), HTN, DMT2 (A1c 8.3%) and CVA s/p TPA ('18), recently treated for PNA who initially presented to [facility] via EMS after syncope reportedly requiring defibrillation. Treated for ACS but left AMA to come to [facility]. On arrival ECG with ST depression in lateral leads. Intubated 11/1 for respiratory failure and was found to have COVID. L/RHC 11/1revealing  of LAD and RCA, elevated filling pressures and CI of 1.5 prompting placement of IABP. Extubated 11/3. IABP was weaned to off 11/7, however the following day developed PMVT cardiac arrest with prompt CPR and defibrillation, started on IV Lidocaine/Amio and IABP ultimately replaced on 11/9. During this admission was found to be bacteremic for which she was treated for Staph epi, Enterococcus faecalis, Cutibacterium with IV abx.  Was listed Status 2, ABO A, PRA 0% (12/12).     A suitable donor was identified and on 12/25, he underwent OHT (ischemic Time: 253min, CMV -/+, Toxo -/-). The following day, extubated and IABP removed. Milrinone was being gradually weaned but when turned off yesterday, despite adequate perfusion indicies, had rise in lactate for which inotropic support was resumed. Currently appears well supported and compensated. Will again trial wean off inotrope. Will also adjust oral antihypertensive regimen with goal to wean off Cardene gtt. Plan for 1st EMBX next week on 1/2.     On 10/25/23, pt underwent OHT  - IABP removed 12/26  - Sabrina and Epi weaned off 12/26  - Milrinone off  - Procardia XL 60 mg BID  - Bumex TID  - 1st RHC/EMBx on Tuesday 1/2  - Transfer to Stepdown 12/31  -  Right femerol rangel in place   - Med CT x 3 - LWS      insulin gtt d/t hyperglycemia - house endo following   - d/c planning - aniticipate home   1/1/24 - VSS - pt. c/o uncomfortable bed last evening - stating he would sign out AMA if he was not given a more comfortable bed.  staff obliged & a new bed was brought in.  pt requesting new urinal with each void as he "is at risk for an infection"  Ptt properly educated on post transplant risks.  states, "A doctor told me I can only use a urinal once to prevent infection"  transplant team to reinforce post-transplant precautions. MED CT x 3 in place  ?d/c femjuarez multani today - will rounds with transplant team  1/2/24 VSS - NPO for cardiac bx -rhc this am-  pt refused blood draw this am despite provider discussing the importance of blood levels post transplant.  Pt acquiesced - allowed blood draw @ 7:30a-d/c planning -   1/3 s/p biopsy and rhc yesterday  Ms ct x 1 remains in place.   \Glucose improved, currentyly off insulin infusion  1/4/23   F/u cultures, rvp neg  Glucose stable overnight  MS ct remains in place   58M : HFrEF (LVIDd 6.4 cm, LVEF 32%), PCI ('08), HTN, DMT2 (A1c 8.3%) and CVA s/p TPA ('18), recently treated for PNA who initially presented to [facility] via EMS after syncope reportedly requiring defibrillation. Treated for ACS but left AMA to come to [facility]. On arrival ECG with ST depression in lateral leads. Intubated 11/1 for respiratory failure and was found to have COVID. L/RHC 11/1revealing  of LAD and RCA, elevated filling pressures and CI of 1.5 prompting placement of IABP. Extubated 11/3. IABP was weaned to off 11/7, however the following day developed PMVT cardiac arrest with prompt CPR and defibrillation, started on IV Lidocaine/Amio and IABP ultimately replaced on 11/9. During this admission was found to be bacteremic for which she was treated for Staph epi, Enterococcus faecalis, Cutibacterium with IV abx.  Was listed Status 2, ABO A, PRA 0% (12/12).     A suitable donor was identified and on 12/25, he underwent OHT (ischemic Time: 253min, CMV -/+, Toxo -/-). The following day, extubated and IABP removed. Milrinone was being gradually weaned but when turned off yesterday, despite adequate perfusion indicies, had rise in lactate for which inotropic support was resumed. Currently appears well supported and compensated. Will again trial wean off inotrope. Will also adjust oral antihypertensive regimen with goal to wean off Cardene gtt. Plan for 1st EMBX next week on 1/2.     On 10/25/23, pt underwent OHT  - IABP removed 12/26  - Sabrina and Epi weaned off 12/26  - Milrinone off  - Procardia XL 60 mg BID  - Bumex TID  - 1st RHC/EMBx on Tuesday 1/2  - Transfer to Stepdown 12/31  -  Right femerol rangel in place   - Med CT x 3 - LWS      insulin gtt d/t hyperglycemia - house endo following   - d/c planning - aniticipate home   1/1/24 - VSS - pt. c/o uncomfortable bed last evening - stating he would sign out AMA if he was not given a more comfortable bed.  staff obliged & a new bed was brought in.  pt requesting new urinal with each void as he "is at risk for an infection"  Ptt properly educated on post transplant risks.  states, "A doctor told me I can only use a urinal once to prevent infection"  transplant team to reinforce post-transplant precautions. MED CT x 3 in place  ?d/c femjuarez multani today - will rounds with transplant team  1/2/24 VSS - NPO for cardiac bx -rhc this am-  pt refused blood draw this am despite provider discussing the importance of blood levels post transplant.  Pt acquiesced - allowed blood draw @ 7:30a-d/c planning -   1/3 s/p biopsy and rhc yesterday  Ms ct x 1 remains in place.   \Glucose improved, currentyly off insulin infusion  1/4/23   F/u cultures, rvp neg  Glucose stable overnight  MS ct remains in place

## 2024-01-04 NOTE — PROGRESS NOTE ADULT - PROBLEM SELECTOR PLAN 2
- s/p Simulect on POD 12/25 and received second dose on POD 4 (12/29).  - Continue with solumedrol taper per protocol, plan to transition to prednisone today  - Continue with Cellcept 1g PO BID  - Will continue to adjust tacro as needed for goal will be 12-14. Now on fluconazole in order to help boost tacro levels - s/p Simulect on POD 12/25 and received second dose on POD 4 (12/29).  - Continue with prednisone taper based on protocol   - Continue with Cellcept 1g PO BID  - Will continue to adjust tacro as needed for goal will be 12-14. Now on fluconazole in order to help boost tacro levels

## 2024-01-04 NOTE — BH CONSULTATION LIAISON PROGRESS NOTE - NSBHATTESTCOMMENTATTENDFT_PSY_A_CORE
58 year-old man with no formal prior psychiatric h/o and past medical history of HTN, CAD (1 stent in 2009), ICH (2008) presented on 11/1 with abnormal EKG. Patient admitted to [facility] for NSTEMI, s/p intubation and catheterization. Patient is now s/p OHT on 12/25. Psychiatry follow up was requested as the primary team was concerned about some behavioral/affective disturbances.    --  Patient seen w/ fellow, case discussed w/ primary team and labs reviewed; agree w/ above plan.  Patient tolerated Zyprexa well, would recommend dose decrease as no acute concerns of ceasar. If patient remains stable, can consider discontinuation of this medication over the weekend. Patient is pleasant and cooperative with no acute concerns. Denies SI/HI/AVH. 58 year-old man with no formal prior psychiatric h/o and past medical history of HTN, CAD (1 stent in 2009), ICH (2008) presented on 11/1 with abnormal EKG. Patient admitted to [facility] for NSTEMI, s/p intubation and catheterization. Patient is now s/p OHT on 12/25. Psychiatry follow up was requested as the primary team was concerned about some behavioral/affective disturbances.    --  Patient seen w/ fellow, case discussed w/ primary team and labs reviewed; agree w/ above plan.  Patient tolerated Zyprexa well, would recommend dose decrease as no acute concerns of ceasar. If patient remains stable, can consider discontinuation of this medication over the weekend. Patient is pleasant and cooperative with no acute concerns. Denies SI/HI/AVH.

## 2024-01-04 NOTE — PROGRESS NOTE ADULT - NS ATTEND AMEND GEN_ALL_CORE FT
57 y/o M admitted with cardiogenic shock, now s/p OHT on 12/25/2023 (CMV -/+, Toxo -/-). RHC/EMB 1/2/2024 with elevated filling pressures, then received hypertonic saline. Continue Bumex 1mg BID. Hypertonic saline today. Continue Hydralazine and Nifedipine for BP control.  Continue immunosuppression with Tacrolimus, CellCept 1000mg BID, Prednisone taper. Continue prophylaxis with Bactrim, Valcyte, Fluconazole.  To evaluate leukocytosis, follow up cultures, check CBC with diff, check procalcitonin.  Ongoing transplant education.

## 2024-01-04 NOTE — BH CONSULTATION LIAISON PROGRESS NOTE - NSBHCHARTREVIEWLAB_PSY_A_CORE FT
11.7   16.20 )-----------( 231      ( 03 Jan 2024 07:18 )             34.8     01-03    131<L>  |  93<L>  |  55<H>  ----------------------------<  150<H>  4.7   |  21<L>  |  1.26    Ca    9.9      03 Jan 2024 07:16  Phos  3.6     01-03  Mg     1.8     01-03    TPro  6.9  /  Alb  4.4  /  TBili  0.6  /  DBili  x   /  AST  31  /  ALT  79<H>  /  AlkPhos  54  01-02

## 2024-01-04 NOTE — PROGRESS NOTE ADULT - SUBJECTIVE AND OBJECTIVE BOX
INFECTIOUS DISEASES FOLLOW UP-- Courtney Peters  [phone]    This is a follow up note for this  58yMale with  Non-ST elevation myocardial infarction (NSTEMI)  leukocytosis- c/o feeling weak,fatigue but no other complaints      ROS:  CONSTITUTIONAL:  No fever, good appetite  CARDIOVASCULAR:  No chest pain or palpitations  RESPIRATORY:  No dyspnea  GASTROINTESTINAL:  No nausea, vomiting, diarrhea, or abdominal pain  GENITOURINARY:  No dysuria  NEUROLOGIC:  No headache,     Allergies    penicillins (Unknown)    Intolerances        ANTIBIOTICS/RELEVANT:  antimicrobials  fluconAZOLE   Tablet 100 milliGRAM(s) Oral <User Schedule>  trimethoprim   80 mG/sulfamethoxazole 400 mG 1 Tablet(s) Oral daily  valGANciclovir 450 milliGRAM(s) Oral every 12 hours    immunologic:  mycophenolate mofetil 1000 milliGRAM(s) Oral every 12 hours  tacrolimus 5.5 milliGRAM(s) Oral <User Schedule>    OTHER:  acetaminophen     Tablet .. 650 milliGRAM(s) Oral every 6 hours PRN  budesonide  80 MICROgram(s)/formoterol 4.5 MICROgram(s) Inhaler 1 Puff(s) Inhalation two times a day  buMETAnide 1 milliGRAM(s) Oral two times a day  buMETAnide Injectable 1 milliGRAM(s) IV Push <User Schedule>  chlorhexidine 4% Liquid 1 Application(s) Topical <User Schedule>  dextrose 5%. 1000 milliLiter(s) IV Continuous <Continuous>  dextrose 5%. 1000 milliLiter(s) IV Continuous <Continuous>  dextrose 50% Injectable 50 milliLiter(s) IV Push every 15 minutes  dextrose Oral Gel 15 Gram(s) Oral once PRN  glucagon  Injectable 1 milliGRAM(s) IntraMuscular once  heparin   Injectable 5000 Unit(s) SubCutaneous every 8 hours  hydrALAZINE 100 milliGRAM(s) Oral every 8 hours  insulin glargine Injectable (LANTUS) 35 Unit(s) SubCutaneous <User Schedule>  insulin lispro (ADMELOG) corrective regimen sliding scale   SubCutaneous <User Schedule>  insulin lispro (ADMELOG) corrective regimen sliding scale   SubCutaneous three times a day before meals  insulin lispro Injectable (ADMELOG) 17 Unit(s) SubCutaneous before dinner  insulin regular Infusion 3 Unit(s)/Hr IV Continuous <Continuous>  lidocaine   4% Patch 3 Patch Transdermal daily  magnesium oxide 400 milliGRAM(s) Oral three times a day with meals  melatonin 5 milliGRAM(s) Oral at bedtime  NIFEdipine XL 60 milliGRAM(s) Oral every 12 hours  OLANZapine 5 milliGRAM(s) Oral at bedtime  pantoprazole    Tablet 40 milliGRAM(s) Oral every 24 hours  polyethylene glycol 3350 17 Gram(s) Oral daily  predniSONE   Tablet 15 milliGRAM(s) Oral every 12 hours  pregabalin 25 milliGRAM(s) Oral at bedtime  senna 2 Tablet(s) Oral at bedtime  sodium chloride 2% . 150 milliLiter(s) IV Continuous <Continuous>      Objective:  Vital Signs Last 24 Hrs  T(C): 36.5 (04 Jan 2024 11:44), Max: 36.9 (03 Jan 2024 20:30)  T(F): 97.7 (04 Jan 2024 11:44), Max: 98.4 (03 Jan 2024 20:30)  HR: 103 (04 Jan 2024 14:50) (95 - 108)  BP: 106/68 (04 Jan 2024 14:50) (106/68 - 125/76)  BP(mean): 80 (04 Jan 2024 13:43) (80 - 94)  RR: 18 (04 Jan 2024 11:44) (18 - 18)  SpO2: 98% (04 Jan 2024 14:50) (95% - 98%)    Parameters below as of 04 Jan 2024 14:50  Patient On (Oxygen Delivery Method): room air        PHYSICAL EXAM:  Constitutional:no acute distress  Eyes:MARVEL, EOMI  Ear/Nose/Throat: no oral lesions, 	  Respiratory: clear BL single chest tube remains  Cardiovascular: S1S2  sternotomy dressing CDI  Gastrointestinal:soft, (+) BS, no tenderness  Extremities:no e/e/c  No Lymphadenopathy  IV sites not inflammed.    LABS:                        11.6   15.97 )-----------( 216      ( 04 Jan 2024 07:49 )             34.9     01-04    126<L>  |  90<L>  |  62<H>  ----------------------------<  298<H>  4.5   |  21<L>  |  1.22    Ca    9.6      04 Jan 2024 07:50  Phos  3.5     01-04  Mg     2.0     01-04    TPro  6.4  /  Alb  4.0  /  TBili  0.3  /  DBili  x   /  AST  23  /  ALT  55<H>  /  AlkPhos  54  01-04      Urinalysis Basic - ( 04 Jan 2024 07:50 )    Color: x / Appearance: x / SG: x / pH: x  Gluc: 298 mg/dL / Ketone: x  / Bili: x / Urobili: x   Blood: x / Protein: x / Nitrite: x   Leuk Esterase: x / RBC: x / WBC x   Sq Epi: x / Non Sq Epi: x / Bacteria: x        MICROBIOLOGY:    Respiratory Viral Panel with COVID-19 by TIM (01.03.24 @ 11:53)    Rapid RVP Result: [facility]   [facility]: This Respiratory Panel uses polymerase chain reaction (PCR) to detect for  adenovirus; coronavirus (HKU1, NL63, 229E, OC43); human metapneumovirus  (hMPV); human enterovirus/rhinovirus (Entero/RV); influenza A; influenza  A/H1; influenza A/H3; influenza A/H1-2009; influenza B; parainfluenza  viruses 1, 2, 3, 4; respiratory syncytial virus; Mycoplasma pneumoniae;  Chlamydophila pneumoniae; and SARS-CoV-2.            RECENT CULTURES:  01-02 @ 20:36  Clean Catch Clean Catch (Midstream)  --  --  --    >=3 organisms. Probable collection contamination.  --      RADIOLOGY & ADDITIONAL STUDIES:    < from: Xray Chest 1 View- PORTABLE-Routine (Xray Chest 1 View- PORTABLE-Routine in AM.) (01.02.24 @ 18:26) >    Frontal expiratory view of the chest shows the heart to be similar in   size. Mediastinal drain remains present.    The lungs show clear right lung with less left atelectasis and there is   no evidence of pneumothorax nor pleural effusion.    IMPRESSION:  Less atelectasis.    < end of copied text >   INFECTIOUS DISEASES FOLLOW UP-- Courtney Peters  [phone]    This is a follow up note for this  58yMale with  Non-ST elevation myocardial infarction (NSTEMI)  leukocytosis- c/o feeling weak,fatigue but no other complaints      ROS:  CONSTITUTIONAL:  No fever, good appetite  CARDIOVASCULAR:  No chest pain or palpitations  RESPIRATORY:  No dyspnea  GASTROINTESTINAL:  No nausea, vomiting, diarrhea, or abdominal pain  GENITOURINARY:  No dysuria  NEUROLOGIC:  No headache,     Allergies    penicillins (Unknown)    Intolerances        ANTIBIOTICS/RELEVANT:  antimicrobials  fluconAZOLE   Tablet 100 milliGRAM(s) Oral <User Schedule>  trimethoprim   80 mG/sulfamethoxazole 400 mG 1 Tablet(s) Oral daily  valGANciclovir 450 milliGRAM(s) Oral every 12 hours    immunologic:  mycophenolate mofetil 1000 milliGRAM(s) Oral every 12 hours  tacrolimus 5.5 milliGRAM(s) Oral <User Schedule>    OTHER:  acetaminophen     Tablet .. 650 milliGRAM(s) Oral every 6 hours PRN  budesonide  80 MICROgram(s)/formoterol 4.5 MICROgram(s) Inhaler 1 Puff(s) Inhalation two times a day  buMETAnide 1 milliGRAM(s) Oral two times a day  buMETAnide Injectable 1 milliGRAM(s) IV Push <User Schedule>  chlorhexidine 4% Liquid 1 Application(s) Topical <User Schedule>  dextrose 5%. 1000 milliLiter(s) IV Continuous <Continuous>  dextrose 5%. 1000 milliLiter(s) IV Continuous <Continuous>  dextrose 50% Injectable 50 milliLiter(s) IV Push every 15 minutes  dextrose Oral Gel 15 Gram(s) Oral once PRN  glucagon  Injectable 1 milliGRAM(s) IntraMuscular once  heparin   Injectable 5000 Unit(s) SubCutaneous every 8 hours  hydrALAZINE 100 milliGRAM(s) Oral every 8 hours  insulin glargine Injectable (LANTUS) 35 Unit(s) SubCutaneous <User Schedule>  insulin lispro (ADMELOG) corrective regimen sliding scale   SubCutaneous <User Schedule>  insulin lispro (ADMELOG) corrective regimen sliding scale   SubCutaneous three times a day before meals  insulin lispro Injectable (ADMELOG) 17 Unit(s) SubCutaneous before dinner  insulin regular Infusion 3 Unit(s)/Hr IV Continuous <Continuous>  lidocaine   4% Patch 3 Patch Transdermal daily  magnesium oxide 400 milliGRAM(s) Oral three times a day with meals  melatonin 5 milliGRAM(s) Oral at bedtime  NIFEdipine XL 60 milliGRAM(s) Oral every 12 hours  OLANZapine 5 milliGRAM(s) Oral at bedtime  pantoprazole    Tablet 40 milliGRAM(s) Oral every 24 hours  polyethylene glycol 3350 17 Gram(s) Oral daily  predniSONE   Tablet 15 milliGRAM(s) Oral every 12 hours  pregabalin 25 milliGRAM(s) Oral at bedtime  senna 2 Tablet(s) Oral at bedtime  sodium chloride 2% . 150 milliLiter(s) IV Continuous <Continuous>      Objective:  Vital Signs Last 24 Hrs  T(C): 36.5 (04 Jan 2024 11:44), Max: 36.9 (03 Jan 2024 20:30)  T(F): 97.7 (04 Jan 2024 11:44), Max: 98.4 (03 Jan 2024 20:30)  HR: 103 (04 Jan 2024 14:50) (95 - 108)  BP: 106/68 (04 Jan 2024 14:50) (106/68 - 125/76)  BP(mean): 80 (04 Jan 2024 13:43) (80 - 94)  RR: 18 (04 Jan 2024 11:44) (18 - 18)  SpO2: 98% (04 Jan 2024 14:50) (95% - 98%)    Parameters below as of 04 Jan 2024 14:50  Patient On (Oxygen Delivery Method): room air        PHYSICAL EXAM:  Constitutional:no acute distress  Eyes:MARVEL, EOMI  Ear/Nose/Throat: no oral lesions, 	  Respiratory: clear BL single chest tube remains  Cardiovascular: S1S2  sternotomy dressing CDI  Gastrointestinal:soft, (+) BS, no tenderness  Extremities:no e/e/c  No Lymphadenopathy  IV sites not inflammed.    LABS:                        11.6   15.97 )-----------( 216      ( 04 Jan 2024 07:49 )             34.9     01-04    126<L>  |  90<L>  |  62<H>  ----------------------------<  298<H>  4.5   |  21<L>  |  1.22    Ca    9.6      04 Jan 2024 07:50  Phos  3.5     01-04  Mg     2.0     01-04    TPro  6.4  /  Alb  4.0  /  TBili  0.3  /  DBili  x   /  AST  23  /  ALT  55<H>  /  AlkPhos  54  01-04      Urinalysis Basic - ( 04 Jan 2024 07:50 )    Color: x / Appearance: x / SG: x / pH: x  Gluc: 298 mg/dL / Ketone: x  / Bili: x / Urobili: x   Blood: x / Protein: x / Nitrite: x   Leuk Esterase: x / RBC: x / WBC x   Sq Epi: x / Non Sq Epi: x / Bacteria: x        MICROBIOLOGY:    Respiratory Viral Panel with COVID-19 by TIM (01.03.24 @ 11:53)    Rapid RVP Result: [facility]   [facility]: This Respiratory Panel uses polymerase chain reaction (PCR) to detect for  adenovirus; coronavirus (HKU1, NL63, 229E, OC43); human metapneumovirus  (hMPV); human enterovirus/rhinovirus (Entero/RV); influenza A; influenza  A/H1; influenza A/H3; influenza A/H1-2009; influenza B; parainfluenza  viruses 1, 2, 3, 4; respiratory syncytial virus; Mycoplasma pneumoniae;  Chlamydophila pneumoniae; and SARS-CoV-2.            RECENT CULTURES:  01-02 @ 20:36  Clean Catch Clean Catch (Midstream)  --  --  --    >=3 organisms. Probable collection contamination.  --      RADIOLOGY & ADDITIONAL STUDIES:    < from: Xray Chest 1 View- PORTABLE-Routine (Xray Chest 1 View- PORTABLE-Routine in AM.) (01.02.24 @ 18:26) >    Frontal expiratory view of the chest shows the heart to be similar in   size. Mediastinal drain remains present.    The lungs show clear right lung with less left atelectasis and there is   no evidence of pneumothorax nor pleural effusion.    IMPRESSION:  Less atelectasis.    < end of copied text >

## 2024-01-04 NOTE — PROGRESS NOTE ADULT - SUBJECTIVE AND OBJECTIVE BOX
LD VALENCIA  58y Male  MRN:[id number]    Patient is a 58y old  Male who presents with a chief complaint of NSTEMI (01 Nov 2023 20:29)    HPI:  57yo M w/ hx HTN, CAD w/ 1 stent in 2009, ICH (2008) presenting with abn ekg. Patient presented to [facility] where he was found to have STEMI, recommended to get cath however patient did not want to get it there so it left and came here.  Patient initially had cough, congestion, fever, was placed on antibiotics on Sunday.  Started feeling nauseous and had a presyncopal event after which he presented to ED last night.  Had chest pain as well.  Chest pain is midsternal.  Not currently having chest pain.  Received 4 aspirin 30 min pta. (01 Nov 2023 15:11)      Patient seen and evaluated at bedside. interval events noted    Interval HPI:   no acute events o/n     PAST MEDICAL & SURGICAL HISTORY:  HTN (hypertension)      CAD (coronary artery disease)  2009; stent      Intracranial hemorrhage  2008      Respiratory arrest  december 1st      Myocardial infarction, unspecified MI type, unspecified artery      History of coronary artery stent placement          REVIEW OF SYSTEMS:  as per hpi     VITALS:   Vital Signs Last 24 Hrs  T(C): 36.5 (04 Jan 2024 11:44), Max: 36.9 (03 Jan 2024 20:30)  T(F): 97.7 (04 Jan 2024 11:44), Max: 98.4 (03 Jan 2024 20:30)  HR: 97 (04 Jan 2024 11:44) (96 - 122)  BP: 111/72 (04 Jan 2024 11:44) (104/72 - 125/76)  BP(mean): 94 (03 Jan 2024 20:30) (82 - 94)  RR: 18 (04 Jan 2024 11:44) (18 - 18)  SpO2: 97% (04 Jan 2024 11:44) (95% - 99%)    Parameters below as of 04 Jan 2024 11:44  Patient On (Oxygen Delivery Method): room air           PHYSICAL EXAM:  GENERAL: NAD, comfortable.    HEAD:  Atraumatic, Normocephalic  EYES: EOMI, PERRLA, conjunctiva and sclera clear  NECK:  No JVD.    CHEST/LUNG: Clear to auscultation bilaterally; No wheeze +chest tube  HEART: Regular rate and rhythm; No murmurs, rubs, or gallops  ABDOMEN: Soft, Nontender, Nondistended; Bowel sounds present  EXTREMITIES:  2+ Peripheral Pulses, No clubbing, cyanosis, or edema  NEUROLOGY: a0x3          Consultant(s) Notes Reviewed:  [x ] YES  [ ] NO  Care Discussed with Consultants/Other Providers [ x] YES  [ ] NO    MEDS:   MEDICATIONS  (STANDING):  budesonide  80 MICROgram(s)/formoterol 4.5 MICROgram(s) Inhaler 1 Puff(s) Inhalation two times a day  buMETAnide Injectable 1 milliGRAM(s) IV Push <User Schedule>  chlorhexidine 4% Liquid 1 Application(s) Topical <User Schedule>  dextrose 5%. 1000 milliLiter(s) (50 mL/Hr) IV Continuous <Continuous>  dextrose 5%. 1000 milliLiter(s) (100 mL/Hr) IV Continuous <Continuous>  dextrose 50% Injectable 50 milliLiter(s) IV Push every 15 minutes  fluconAZOLE   Tablet 100 milliGRAM(s) Oral <User Schedule>  glucagon  Injectable 1 milliGRAM(s) IntraMuscular once  hydrALAZINE 100 milliGRAM(s) Oral every 8 hours  insulin glargine Injectable (LANTUS) 35 Unit(s) SubCutaneous <User Schedule>  insulin lispro (ADMELOG) corrective regimen sliding scale   SubCutaneous three times a day before meals  insulin lispro (ADMELOG) corrective regimen sliding scale   SubCutaneous <User Schedule>  insulin lispro Injectable (ADMELOG) 17 Unit(s) SubCutaneous three times a day before meals  insulin regular Infusion 3 Unit(s)/Hr (3 mL/Hr) IV Continuous <Continuous>  lidocaine   4% Patch 3 Patch Transdermal daily  magnesium oxide 400 milliGRAM(s) Oral three times a day with meals  melatonin 5 milliGRAM(s) Oral at bedtime  mycophenolate mofetil 1000 milliGRAM(s) Oral every 12 hours  NIFEdipine XL 60 milliGRAM(s) Oral every 12 hours  OLANZapine 5 milliGRAM(s) Oral at bedtime  pantoprazole    Tablet 40 milliGRAM(s) Oral every 24 hours  polyethylene glycol 3350 17 Gram(s) Oral daily  predniSONE   Tablet 15 milliGRAM(s) Oral every 12 hours  pregabalin 25 milliGRAM(s) Oral at bedtime  senna 2 Tablet(s) Oral at bedtime  tacrolimus 5.5 milliGRAM(s) Oral <User Schedule>  tacrolimus 0.5 milliGRAM(s) Oral once  trimethoprim   80 mG/sulfamethoxazole 400 mG 1 Tablet(s) Oral daily  valGANciclovir 450 milliGRAM(s) Oral every 12 hours    MEDICATIONS  (PRN):  acetaminophen     Tablet .. 650 milliGRAM(s) Oral every 6 hours PRN Mild pain  dextrose Oral Gel 15 Gram(s) Oral once PRN Blood Glucose LESS THAN 70 milliGRAM(s)/deciliter        Allergies    penicillins (Unknown)    Intolerances        LABS:                                                                   11.6   15.97 )-----------( 216      ( 04 Jan 2024 07:49 )             34.9   01-04    126<L>  |  90<L>  |  62<H>  ----------------------------<  298<H>  4.5   |  21<L>  |  1.22    Ca    9.6      04 Jan 2024 07:50  Phos  3.5     01-04  Mg     2.0     01-04    TPro  6.4  /  Alb  4.0  /  TBili  0.3  /  DBili  x   /  AST  23  /  ALT  55<H>  /  AlkPhos  54  01-04    CAPILLARY BLOOD GLUCOSE      POCT Blood Glucose.: 214 mg/dL (04 Jan 2024 11:24)  POCT Blood Glucose.: 285 mg/dL (04 Jan 2024 07:55)  POCT Blood Glucose.: 180 mg/dL (04 Jan 2024 02:05)  POCT Blood Glucose.: 82 mg/dL (03 Jan 2024 21:40)  POCT Blood Glucose.: 111 mg/dL (03 Jan 2024 18:01)        < from: CT Abdomen and Pelvis No Cont (11.28.23 @ 03:38) >  IMPRESSION:  Mildly dilated colon and prominent but not overly dilated small bowel   with air-fluid levels. No discrete transition point. Findings are   suggestive of ileus.    Intra-aortic balloon pump with the inferior marker at the level of the   inferior mesenteric artery and the balloon overlying the origins of the   renal arteries, celiac artery, and superior mesenteric artery. Consider   repositioning. Concern for IABP positioning was discussed with LANETTE Hawthorne   on 11/28/2023 at 3:42 AM by Dr. Shepard.    < end of copied text >          < from: Colonoscopy (11.17.23 @ 10:35) >                                                                                                        Impression:          - The entire examined colon is normal on direct and retroflexion views.                       - No specimens collected.  Recommendation:      - Resume previous diet today.                       - No large polyps or masses detected, No objection from GI standpoint to                 proceed with heart transplantation/advanced heart therapies.                       - Please call back should any further questions or concerns arise.    < end of copied text >     < from: Xray Chest 1 View- PORTABLE-Urgent (11.01.23 @ 07:42) >    IMPRESSION:  Clear lungs.    ---End of Report ---        < end of copied text >  < from: TTE W or WO Ultrasound Enhancing Agent (11.01.23 @ 10:23) >  _____________________________     CONCLUSIONS:      1. Left ventricular cavity is moderately dilated. Left ventricular wall thickness is normal. Left ventricular systolic function is severely decreased with an ejection fraction of 32 % by Chinchilla's method of disks. Regional wall motion abnormalities present.   2. Multiple segmental abnormalities exist. See findings.   3. There is moderate (grade 2) left ventricular diastolic dysfunction, with indeterminant filling pressure.   4. Normal right ventricular cavity size, wall thickness, and systolic function.   5. No significant valvular disease.   6. No pericardial effusion seen.   7. Compared to the transthoracic echocardiogram performed on 1/25/2017 the areas of akinesis are unchanged but there has been a decline in LV systolic function with new areas of hypokinesis.    __________________________________________________________________    < end of copied text >  < from: TTE Limited W or WO Ultrasound Enhancing Agent (11.02.23 @ 07:41) >  __________________________     CONCLUSIONS:      1. After obtaining consent, Definity ultrasound enhancing agent was given for enhanced left ventricular opacification and improved delineation of the left ventricular endocardial borders. Left ventricular systolic function is severely decreased with a calculated ejection fraction of 22 % by the Chinchilla's biplane method of disks. There is a left ventricular thrombus.   2. Findings were discussed with Litzy BOSS on 11/2/2023 at 8.49am.   3. There is a left ventricular thrombus.    _________________________________________________________________    < end of copied text >   LD VALENCIA  58y Male  MRN:[id number]    Patient is a 58y old  Male who presents with a chief complaint of NSTEMI (01 Nov 2023 20:29)    HPI:  59yo M w/ hx HTN, CAD w/ 1 stent in 2009, ICH (2008) presenting with abn ekg. Patient presented to [facility] where he was found to have STEMI, recommended to get cath however patient did not want to get it there so it left and came here.  Patient initially had cough, congestion, fever, was placed on antibiotics on Sunday.  Started feeling nauseous and had a presyncopal event after which he presented to ED last night.  Had chest pain as well.  Chest pain is midsternal.  Not currently having chest pain.  Received 4 aspirin 30 min pta. (01 Nov 2023 15:11)      Patient seen and evaluated at bedside. interval events noted    Interval HPI:   no acute events o/n     PAST MEDICAL & SURGICAL HISTORY:  HTN (hypertension)      CAD (coronary artery disease)  2009; stent      Intracranial hemorrhage  2008      Respiratory arrest  december 1st      Myocardial infarction, unspecified MI type, unspecified artery      History of coronary artery stent placement          REVIEW OF SYSTEMS:  as per hpi     VITALS:   Vital Signs Last 24 Hrs  T(C): 36.5 (04 Jan 2024 11:44), Max: 36.9 (03 Jan 2024 20:30)  T(F): 97.7 (04 Jan 2024 11:44), Max: 98.4 (03 Jan 2024 20:30)  HR: 97 (04 Jan 2024 11:44) (96 - 122)  BP: 111/72 (04 Jan 2024 11:44) (104/72 - 125/76)  BP(mean): 94 (03 Jan 2024 20:30) (82 - 94)  RR: 18 (04 Jan 2024 11:44) (18 - 18)  SpO2: 97% (04 Jan 2024 11:44) (95% - 99%)    Parameters below as of 04 Jan 2024 11:44  Patient On (Oxygen Delivery Method): room air           PHYSICAL EXAM:  GENERAL: NAD, comfortable.    HEAD:  Atraumatic, Normocephalic  EYES: EOMI, PERRLA, conjunctiva and sclera clear  NECK:  No JVD.    CHEST/LUNG: Clear to auscultation bilaterally; No wheeze +chest tube  HEART: Regular rate and rhythm; No murmurs, rubs, or gallops  ABDOMEN: Soft, Nontender, Nondistended; Bowel sounds present  EXTREMITIES:  2+ Peripheral Pulses, No clubbing, cyanosis, or edema  NEUROLOGY: a0x3          Consultant(s) Notes Reviewed:  [x ] YES  [ ] NO  Care Discussed with Consultants/Other Providers [ x] YES  [ ] NO    MEDS:   MEDICATIONS  (STANDING):  budesonide  80 MICROgram(s)/formoterol 4.5 MICROgram(s) Inhaler 1 Puff(s) Inhalation two times a day  buMETAnide Injectable 1 milliGRAM(s) IV Push <User Schedule>  chlorhexidine 4% Liquid 1 Application(s) Topical <User Schedule>  dextrose 5%. 1000 milliLiter(s) (50 mL/Hr) IV Continuous <Continuous>  dextrose 5%. 1000 milliLiter(s) (100 mL/Hr) IV Continuous <Continuous>  dextrose 50% Injectable 50 milliLiter(s) IV Push every 15 minutes  fluconAZOLE   Tablet 100 milliGRAM(s) Oral <User Schedule>  glucagon  Injectable 1 milliGRAM(s) IntraMuscular once  hydrALAZINE 100 milliGRAM(s) Oral every 8 hours  insulin glargine Injectable (LANTUS) 35 Unit(s) SubCutaneous <User Schedule>  insulin lispro (ADMELOG) corrective regimen sliding scale   SubCutaneous three times a day before meals  insulin lispro (ADMELOG) corrective regimen sliding scale   SubCutaneous <User Schedule>  insulin lispro Injectable (ADMELOG) 17 Unit(s) SubCutaneous three times a day before meals  insulin regular Infusion 3 Unit(s)/Hr (3 mL/Hr) IV Continuous <Continuous>  lidocaine   4% Patch 3 Patch Transdermal daily  magnesium oxide 400 milliGRAM(s) Oral three times a day with meals  melatonin 5 milliGRAM(s) Oral at bedtime  mycophenolate mofetil 1000 milliGRAM(s) Oral every 12 hours  NIFEdipine XL 60 milliGRAM(s) Oral every 12 hours  OLANZapine 5 milliGRAM(s) Oral at bedtime  pantoprazole    Tablet 40 milliGRAM(s) Oral every 24 hours  polyethylene glycol 3350 17 Gram(s) Oral daily  predniSONE   Tablet 15 milliGRAM(s) Oral every 12 hours  pregabalin 25 milliGRAM(s) Oral at bedtime  senna 2 Tablet(s) Oral at bedtime  tacrolimus 5.5 milliGRAM(s) Oral <User Schedule>  tacrolimus 0.5 milliGRAM(s) Oral once  trimethoprim   80 mG/sulfamethoxazole 400 mG 1 Tablet(s) Oral daily  valGANciclovir 450 milliGRAM(s) Oral every 12 hours    MEDICATIONS  (PRN):  acetaminophen     Tablet .. 650 milliGRAM(s) Oral every 6 hours PRN Mild pain  dextrose Oral Gel 15 Gram(s) Oral once PRN Blood Glucose LESS THAN 70 milliGRAM(s)/deciliter        Allergies    penicillins (Unknown)    Intolerances        LABS:                                                                   11.6   15.97 )-----------( 216      ( 04 Jan 2024 07:49 )             34.9   01-04    126<L>  |  90<L>  |  62<H>  ----------------------------<  298<H>  4.5   |  21<L>  |  1.22    Ca    9.6      04 Jan 2024 07:50  Phos  3.5     01-04  Mg     2.0     01-04    TPro  6.4  /  Alb  4.0  /  TBili  0.3  /  DBili  x   /  AST  23  /  ALT  55<H>  /  AlkPhos  54  01-04    CAPILLARY BLOOD GLUCOSE      POCT Blood Glucose.: 214 mg/dL (04 Jan 2024 11:24)  POCT Blood Glucose.: 285 mg/dL (04 Jan 2024 07:55)  POCT Blood Glucose.: 180 mg/dL (04 Jan 2024 02:05)  POCT Blood Glucose.: 82 mg/dL (03 Jan 2024 21:40)  POCT Blood Glucose.: 111 mg/dL (03 Jan 2024 18:01)        < from: CT Abdomen and Pelvis No Cont (11.28.23 @ 03:38) >  IMPRESSION:  Mildly dilated colon and prominent but not overly dilated small bowel   with air-fluid levels. No discrete transition point. Findings are   suggestive of ileus.    Intra-aortic balloon pump with the inferior marker at the level of the   inferior mesenteric artery and the balloon overlying the origins of the   renal arteries, celiac artery, and superior mesenteric artery. Consider   repositioning. Concern for IABP positioning was discussed with LANETTE Hawthorne   on 11/28/2023 at 3:42 AM by Dr. Shepard.    < end of copied text >          < from: Colonoscopy (11.17.23 @ 10:35) >                                                                                                        Impression:          - The entire examined colon is normal on direct and retroflexion views.                       - No specimens collected.  Recommendation:      - Resume previous diet today.                       - No large polyps or masses detected, No objection from GI standpoint to                 proceed with heart transplantation/advanced heart therapies.                       - Please call back should any further questions or concerns arise.    < end of copied text >     < from: Xray Chest 1 View- PORTABLE-Urgent (11.01.23 @ 07:42) >    IMPRESSION:  Clear lungs.    ---End of Report ---        < end of copied text >  < from: TTE W or WO Ultrasound Enhancing Agent (11.01.23 @ 10:23) >  _____________________________     CONCLUSIONS:      1. Left ventricular cavity is moderately dilated. Left ventricular wall thickness is normal. Left ventricular systolic function is severely decreased with an ejection fraction of 32 % by Chinchilla's method of disks. Regional wall motion abnormalities present.   2. Multiple segmental abnormalities exist. See findings.   3. There is moderate (grade 2) left ventricular diastolic dysfunction, with indeterminant filling pressure.   4. Normal right ventricular cavity size, wall thickness, and systolic function.   5. No significant valvular disease.   6. No pericardial effusion seen.   7. Compared to the transthoracic echocardiogram performed on 1/25/2017 the areas of akinesis are unchanged but there has been a decline in LV systolic function with new areas of hypokinesis.    __________________________________________________________________    < end of copied text >  < from: TTE Limited W or WO Ultrasound Enhancing Agent (11.02.23 @ 07:41) >  __________________________     CONCLUSIONS:      1. After obtaining consent, Definity ultrasound enhancing agent was given for enhanced left ventricular opacification and improved delineation of the left ventricular endocardial borders. Left ventricular systolic function is severely decreased with a calculated ejection fraction of 22 % by the Chinchilla's biplane method of disks. There is a left ventricular thrombus.   2. Findings were discussed with Litzy BOSS on 11/2/2023 at 8.49am.   3. There is a left ventricular thrombus.    _________________________________________________________________    < end of copied text >

## 2024-01-04 NOTE — BH CONSULTATION LIAISON PROGRESS NOTE - CURRENT MEDICATION
MEDICATIONS  (STANDING):  aMIOdarone    Tablet 200 milliGRAM(s) Oral daily  aMIOdarone    Tablet   Oral   aspirin  chewable 81 milliGRAM(s) Oral daily  atorvastatin 80 milliGRAM(s) Oral at bedtime  budesonide  80 MICROgram(s)/formoterol 4.5 MICROgram(s) Inhaler 2 Puff(s) Inhalation two times a day  carvedilol 25 milliGRAM(s) Oral every 12 hours  chlorhexidine 2% Cloths 1 Application(s) Topical daily  dextrose 50% Injectable 12.5 Gram(s) IV Push once  dextrose 50% Injectable 25 Gram(s) IV Push once  heparin  Infusion 1600 Unit(s)/Hr (13 mL/Hr) IV Continuous <Continuous>  hydrALAZINE 100 milliGRAM(s) Oral three times a day  insulin glargine Injectable (LANTUS) 12 Unit(s) SubCutaneous at bedtime  insulin lispro (ADMELOG) corrective regimen sliding scale   SubCutaneous three times a day before meals  insulin lispro (ADMELOG) corrective regimen sliding scale   SubCutaneous at bedtime  insulin lispro Injectable (ADMELOG) 8 Unit(s) SubCutaneous three times a day before meals  isosorbide   dinitrate Tablet (ISORDIL) 40 milliGRAM(s) Oral three times a day  polyethylene glycol 3350 17 Gram(s) Oral two times a day  senna 2 Tablet(s) Oral at bedtime  spironolactone 25 milliGRAM(s) Oral daily  vancomycin  IVPB 1500 milliGRAM(s) IV Intermittent once    MEDICATIONS  (PRN):  
MEDICATIONS  (STANDING):  acetaminophen     Tablet .. 650 milliGRAM(s) Oral every 6 hours  budesonide  80 MICROgram(s)/formoterol 4.5 MICROgram(s) Inhaler 1 Puff(s) Inhalation two times a day  buMETAnide Injectable 1 milliGRAM(s) IV Push <User Schedule>  chlorhexidine 4% Liquid 1 Application(s) Topical <User Schedule>  dextrose 5%. 1000 milliLiter(s) (100 mL/Hr) IV Continuous <Continuous>  dextrose 5%. 1000 milliLiter(s) (50 mL/Hr) IV Continuous <Continuous>  dextrose 50% Injectable 50 milliLiter(s) IV Push every 15 minutes  fluconAZOLE   Tablet 100 milliGRAM(s) Oral <User Schedule>  glucagon  Injectable 1 milliGRAM(s) IntraMuscular once  hydrALAZINE 100 milliGRAM(s) Oral every 8 hours  insulin glargine Injectable (LANTUS) 30 Unit(s) SubCutaneous <User Schedule>  insulin lispro (ADMELOG) corrective regimen sliding scale   SubCutaneous three times a day before meals  insulin lispro (ADMELOG) corrective regimen sliding scale   SubCutaneous <User Schedule>  insulin lispro Injectable (ADMELOG) 17 Unit(s) SubCutaneous three times a day before meals  insulin regular Infusion 3 Unit(s)/Hr (3 mL/Hr) IV Continuous <Continuous>  lidocaine   4% Patch 3 Patch Transdermal daily  magnesium oxide 400 milliGRAM(s) Oral three times a day with meals  melatonin 5 milliGRAM(s) Oral at bedtime  mycophenolate mofetil 1000 milliGRAM(s) Oral every 12 hours  naloxegol 25 milliGRAM(s) Oral daily  NIFEdipine XL 60 milliGRAM(s) Oral every 12 hours  OLANZapine 5 milliGRAM(s) Oral at bedtime  pantoprazole    Tablet 40 milliGRAM(s) Oral every 24 hours  polyethylene glycol 3350 17 Gram(s) Oral daily  predniSONE   Tablet 15 milliGRAM(s) Oral every 12 hours  pregabalin 50 milliGRAM(s) Oral every 8 hours  senna 2 Tablet(s) Oral at bedtime  tacrolimus 5 milliGRAM(s) Oral <User Schedule>  trimethoprim   80 mG/sulfamethoxazole 400 mG 1 Tablet(s) Oral daily  valGANciclovir 450 milliGRAM(s) Oral every 12 hours    MEDICATIONS  (PRN):  dextrose Oral Gel 15 Gram(s) Oral once PRN Blood Glucose LESS THAN 70 milliGRAM(s)/deciliter  
MEDICATIONS  (STANDING):  acetaminophen     Tablet .. 650 milliGRAM(s) Oral every 6 hours  budesonide  80 MICROgram(s)/formoterol 4.5 MICROgram(s) Inhaler 1 Puff(s) Inhalation two times a day  buMETAnide Injectable 1 milliGRAM(s) IV Push <User Schedule>  chlorhexidine 4% Liquid 1 Application(s) Topical <User Schedule>  dextrose 5%. 1000 milliLiter(s) (100 mL/Hr) IV Continuous <Continuous>  dextrose 5%. 1000 milliLiter(s) (50 mL/Hr) IV Continuous <Continuous>  dextrose 50% Injectable 50 milliLiter(s) IV Push every 15 minutes  fluconAZOLE   Tablet 100 milliGRAM(s) Oral <User Schedule>  glucagon  Injectable 1 milliGRAM(s) IntraMuscular once  hydrALAZINE 100 milliGRAM(s) Oral every 8 hours  insulin glargine Injectable (LANTUS) 30 Unit(s) SubCutaneous <User Schedule>  insulin lispro (ADMELOG) corrective regimen sliding scale   SubCutaneous at bedtime  insulin lispro (ADMELOG) corrective regimen sliding scale   SubCutaneous three times a day before meals  insulin lispro Injectable (ADMELOG) 17 Unit(s) SubCutaneous three times a day before meals  insulin regular Infusion 3 Unit(s)/Hr (3 mL/Hr) IV Continuous <Continuous>  lidocaine   4% Patch 3 Patch Transdermal daily  magnesium oxide 400 milliGRAM(s) Oral three times a day with meals  melatonin 5 milliGRAM(s) Oral at bedtime  methylPREDNISolone sodium succinate Injectable 12 milliGRAM(s) IV Push every 12 hours  methylPREDNISolone sodium succinate Injectable   IV Push   metoclopramide Injectable 10 milliGRAM(s) IV Push every 8 hours  mycophenolate mofetil 1000 milliGRAM(s) Oral every 12 hours  naloxegol 25 milliGRAM(s) Oral daily  NIFEdipine XL 60 milliGRAM(s) Oral every 12 hours  OLANZapine 5 milliGRAM(s) Oral at bedtime  pantoprazole  Injectable 40 milliGRAM(s) IV Push daily  polyethylene glycol 3350 17 Gram(s) Oral daily  pregabalin 50 milliGRAM(s) Oral every 8 hours  senna 2 Tablet(s) Oral at bedtime  tacrolimus 6 milliGRAM(s) Oral <User Schedule>  trimethoprim   80 mG/sulfamethoxazole 400 mG 1 Tablet(s) Oral daily  valGANciclovir 450 milliGRAM(s) Oral every 12 hours    MEDICATIONS  (PRN):  dextrose Oral Gel 15 Gram(s) Oral once PRN Blood Glucose LESS THAN 70 milliGRAM(s)/deciliter

## 2024-01-04 NOTE — BH CONSULTATION LIAISON PROGRESS NOTE - NSBHCHARTREVIEWINVESTIGATE_PSY_A_CORE FT
Systolic  mmHg    Diastolic BP 65 mmHg    Ventricular Rate 79 BPM    Atrial Rate 79 BPM    P-R Interval 198 ms    QRS Duration 118 ms    Q-T Interval 416 ms    QTC Calculation(Bazett) 477 ms    P Axis 34 degrees    R Axis 1 degrees    T Axis 205 degrees    Diagnosis Line NORMAL SINUS RHYTHM  LEFT VENTRICULAR HYPERTROPHY WITH QRS WIDENING AND REPOLARIZATION ABNORMALITY  ABNORMAL ECG  WHEN COMPARED WITH 22-DEC-2023  VPD no longer present  Confirmed by MD PAULINO, ERIC ([id number]) on 12/23/2023 5:08:18 PM     Systolic  mmHg    Diastolic BP 65 mmHg    Ventricular Rate 79 BPM    Atrial Rate 79 BPM    P-R Interval 198 ms    QRS Duration 118 ms    Q-T Interval 416 ms    QTC Calculation(Bazett) 477 ms    P Axis 34 degrees    R Axis 1 degrees    T Axis 205 degrees    Diagnosis Line NORMAL SINUS RHYTHM  LEFT VENTRICULAR HYPERTROPHY WITH QRS WIDENING AND REPOLARIZATION ABNORMALITY  ABNORMAL ECG  WHEN COMPARED WITH 22-DEC-2023  VPD no longer present  Confirmed by MD PAULINO, ERIC ([id number]) on 12/23/2023 5:08:18 PM

## 2024-01-04 NOTE — BH CONSULTATION LIAISON PROGRESS NOTE - NSBHCONSULTFOLLOWAFTERCARE_PSY_A_CORE FT
Patient doesn't meet criteria for inpatient psychiatric hospitalization.  He can f/u with his PCP   Patient [facility] f[facility]u [facility], [address] [place], [facility], First Floor, [place] [id number]. [phone]   Patient doesn't meet criteria for inpatient psychiatric hospitalization.  He can f/u with his PCP   Patient can f/u [facility], [address] [place], [facility], First Floor, [place] [id number]. [phone]

## 2024-01-04 NOTE — PROGRESS NOTE ADULT - SUBJECTIVE AND OBJECTIVE BOX
ADVANCED HEART FAILURE & TRANSPLANT- PROGRESS NOTE  *To reach the NS1 Team from 8am to 5pm, please call [phone].  ___________________________________________________________________________    Subjective:  - no issues    Medications:  acetaminophen     Tablet .. 650 milliGRAM(s) Oral every 6 hours  budesonide  80 MICROgram(s)/formoterol 4.5 MICROgram(s) Inhaler 1 Puff(s) Inhalation two times a day  buMETAnide Injectable 1 milliGRAM(s) IV Push <User Schedule>  chlorhexidine 4% Liquid 1 Application(s) Topical <User Schedule>  dextrose 5%. 1000 milliLiter(s) IV Continuous <Continuous>  dextrose 5%. 1000 milliLiter(s) IV Continuous <Continuous>  dextrose 50% Injectable 50 milliLiter(s) IV Push every 15 minutes  dextrose Oral Gel 15 Gram(s) Oral once PRN  fluconAZOLE   Tablet 100 milliGRAM(s) Oral <User Schedule>  glucagon  Injectable 1 milliGRAM(s) IntraMuscular once  hydrALAZINE 100 milliGRAM(s) Oral every 8 hours  insulin glargine Injectable (LANTUS) 30 Unit(s) SubCutaneous <User Schedule>  insulin lispro (ADMELOG) corrective regimen sliding scale   SubCutaneous <User Schedule>  insulin lispro (ADMELOG) corrective regimen sliding scale   SubCutaneous three times a day before meals  insulin lispro Injectable (ADMELOG) 17 Unit(s) SubCutaneous three times a day before meals  insulin regular Infusion 3 Unit(s)/Hr IV Continuous <Continuous>  lidocaine   4% Patch 3 Patch Transdermal daily  magnesium oxide 400 milliGRAM(s) Oral three times a day with meals  melatonin 5 milliGRAM(s) Oral at bedtime  mycophenolate mofetil 1000 milliGRAM(s) Oral every 12 hours  naloxegol 25 milliGRAM(s) Oral daily  NIFEdipine XL 60 milliGRAM(s) Oral every 12 hours  OLANZapine 5 milliGRAM(s) Oral at bedtime  pantoprazole    Tablet 40 milliGRAM(s) Oral every 24 hours  polyethylene glycol 3350 17 Gram(s) Oral daily  predniSONE   Tablet 15 milliGRAM(s) Oral every 12 hours  pregabalin 50 milliGRAM(s) Oral every 8 hours  senna 2 Tablet(s) Oral at bedtime  tacrolimus 5 milliGRAM(s) Oral <User Schedule>  trimethoprim   80 mG/sulfamethoxazole 400 mG 1 Tablet(s) Oral daily  valGANciclovir 450 milliGRAM(s) Oral every 12 hours      Vitals:  Vital Signs Last 24 Hours  T(C): 36.4 (24 @ 06:06), Max: 36.9 (24 @ 20:30)  HR: 96 (24 @ 06:06) (96 - 122)  BP: 107/72 (24 @ 06:06) (97/66 - 125/76)  RR: 18 (24 @ 06:06) (18 - 18)  SpO2: 95% (24 @ 06:06) (95% - 100%)    Weight in k.1 ( @ 08:54)    I&O's Summary    2024 07:01  -  2024 07:00  --------------------------------------------------------  IN: 1380 mL / OUT: 2755 mL / NET: -1375 mL        Physical Exam  General: No distress. Comfortable.  Neck: Neck supple. JVP not elevated. No masses  Chest: Clear to auscultation bilaterally  CV: Normal S1 and S2.  Abdomen: Soft, non-distended, non-tender  Extremities: warm and perfused  Neurology: Alert and oriented times three.     Labs:                        11.7   16.20 )-----------( 231      ( 2024 07:18 )             34.8         131<L>  |  93<L>  |  55<H>  ----------------------------<  150<H>  4.7   |  21<L>  |  1.26    Ca    9.9      2024 07:16  Phos  3.6       Mg     1.8          ADVANCED HEART FAILURE & TRANSPLANT- PROGRESS NOTE  *To reach the NS1 Team from 8am to 5pm, please call [phone].  ___________________________________________________________________________    Subjective:  - no issues    Medications:  acetaminophen     Tablet .. 650 milliGRAM(s) Oral every 6 hours  budesonide  80 MICROgram(s)/formoterol 4.5 MICROgram(s) Inhaler 1 Puff(s) Inhalation two times a day  buMETAnide Injectable 1 milliGRAM(s) IV Push <User Schedule>  chlorhexidine 4% Liquid 1 Application(s) Topical <User Schedule>  dextrose 5%. 1000 milliLiter(s) IV Continuous <Continuous>  dextrose 5%. 1000 milliLiter(s) IV Continuous <Continuous>  dextrose 50% Injectable 50 milliLiter(s) IV Push every 15 minutes  dextrose Oral Gel 15 Gram(s) Oral once PRN  fluconAZOLE   Tablet 100 milliGRAM(s) Oral <User Schedule>  glucagon  Injectable 1 milliGRAM(s) IntraMuscular once  hydrALAZINE 100 milliGRAM(s) Oral every 8 hours  insulin glargine Injectable (LANTUS) 30 Unit(s) SubCutaneous <User Schedule>  insulin lispro (ADMELOG) corrective regimen sliding scale   SubCutaneous <User Schedule>  insulin lispro (ADMELOG) corrective regimen sliding scale   SubCutaneous three times a day before meals  insulin lispro Injectable (ADMELOG) 17 Unit(s) SubCutaneous three times a day before meals  insulin regular Infusion 3 Unit(s)/Hr IV Continuous <Continuous>  lidocaine   4% Patch 3 Patch Transdermal daily  magnesium oxide 400 milliGRAM(s) Oral three times a day with meals  melatonin 5 milliGRAM(s) Oral at bedtime  mycophenolate mofetil 1000 milliGRAM(s) Oral every 12 hours  naloxegol 25 milliGRAM(s) Oral daily  NIFEdipine XL 60 milliGRAM(s) Oral every 12 hours  OLANZapine 5 milliGRAM(s) Oral at bedtime  pantoprazole    Tablet 40 milliGRAM(s) Oral every 24 hours  polyethylene glycol 3350 17 Gram(s) Oral daily  predniSONE   Tablet 15 milliGRAM(s) Oral every 12 hours  pregabalin 50 milliGRAM(s) Oral every 8 hours  senna 2 Tablet(s) Oral at bedtime  tacrolimus 5 milliGRAM(s) Oral <User Schedule>  trimethoprim   80 mG/sulfamethoxazole 400 mG 1 Tablet(s) Oral daily  valGANciclovir 450 milliGRAM(s) Oral every 12 hours      Vitals:  Vital Signs Last 24 Hours  T(C): 36.4 (24 @ 06:06), Max: 36.9 (24 @ 20:30)  HR: 96 (24 @ 06:06) (96 - 122)  BP: 107/72 (24 @ 06:06) (97/66 - 125/76)  RR: 18 (24 @ 06:06) (18 - 18)  SpO2: 95% (24 @ 06:06) (95% - 100%)    Weight in k.1 ( @ 08:54)    I&O's Summary    2024 07:01  -  2024 07:00  --------------------------------------------------------  IN: 1380 mL / OUT: 2755 mL / NET: -1375 mL        Physical Exam  General: No distress. Comfortable.  Neck: Neck supple. JVP not elevated. No masses  Chest: Clear to auscultation bilaterally  CV: Normal S1 and S2.  Abdomen: Soft, non-distended, non-tender  Extremities: warm and perfused  Neurology: Alert and oriented times three.     Labs:                        11.7   16.20 )-----------( 231      ( 2024 07:18 )             34.8         131<L>  |  93<L>  |  55<H>  ----------------------------<  150<H>  4.7   |  21<L>  |  1.26    Ca    9.9      2024 07:16  Phos  3.6       Mg     1.8

## 2024-01-04 NOTE — BH CONSULTATION LIAISON PROGRESS NOTE - NSBHASSESSMENTFT_PSY_ALL_CORE
Mr. Hardy is a 58 year-old man with no formal prior psychiatric h/o and past medical history of HTN, CAD (1 stent in 2009), ICH (2008) presented on 11/1 with abnormal EKG. Patient admitted to [facility] for NSTEMI, s/p intubation and catheterization. Patient is now s/p OHT on 12/25.  Psychiatry follow up was requested as the primary team was concerned about some behavioral/affective disturbances.      Patient seen at the bedside. Calm, controlled, no signs of acute agitation. Reported worsening of anxiety, with insomnia and irritability. Denied any perceptual disturbances, denied SI/HI/I/P. 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   Patient currently in agreement to start low dose neuroleptic to prevent exacerbation of affective symptoms    Plan:  Routine observation  START Zyprexa 2.5 mg PO QHS, will titrate further as tolerated. Monitor QTc ( ensure < 500) and VS  Can use Atarax 10 mg PO TID PRN for anxiety; if patient amenable to increase in dose consider Atarax 25 mg PO BID PRN anxiety  Continue to f/u with Psychology team  Rest of the medical workup as per primary team Mr. Hardy is a 58 year-old man with no formal prior psychiatric h/o and past medical history of HTN, CAD (1 stent in 2009), ICH (2008) presented on 11/1 with abnormal EKG. Patient admitted to [facility] for NSTEMI, s/p intubation and catheterization. Patient is now s/p OHT on 12/25.  Psychiatry follow up was requested as the primary team was concerned about some behavioral/affective disturbances.      Patient seen at the bedside. Calm, controlled, no signs of acute agitation. Reported worsening of anxiety, with insomnia and irritability. Denied any perceptual disturbances, denied SI/HI/I/P. 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   Patient currently in agreement to start low dose neuroleptic to prevent exacerbation of affective symptoms    Plan:  Routine observation  START Zyprexa 2.5 mg PO QHS, will titrate further as tolerated. Monitor QTc ( ensure < 500) and VS  Can use Atarax 10 mg PO TID PRN for anxiety; if patient amenable to increase in dose consider Atarax 25 mg PO BID PRN anxiety  Continue to f/u with Psychology team  Rest of the medical workup as per primary team Mr. Hardy is a 58 year-old man with no formal prior psychiatric h/o and past medical history of HTN, CAD (1 stent in 2009), ICH (2008) presented on 11/1 with abnormal EKG. Patient admitted to [facility] for NSTEMI, s/p intubation and catheterization. Patient is now s/p OHT on 12/25.  Psychiatry follow up was requested as the primary team was concerned about some behavioral/affective disturbances.      1/4: Patient seen at the bedside. Calm, controlled, no signs of acute agitation. No acute behavioral concerns reported overnight. Appeared euthymic on evaluation, denied feeling anxious/depressed. Denied any perceptual disturbances, denied SI/HI/I/P.   Patient currently in agreement to start low dose neuroleptic to prevent exacerbation of affective symptoms    Plan:  Routine observation  DECREASE the dose of Zyprexa 5 mg to 2.5 mg PO QHS (as patient reported being overly sedated) Monitor QTc ( ensure < 500) and VS  Can use Atarax 10 mg PO TID PRN for anxiety; if patient amenable to increase in dose consider Atarax 25 mg PO BID PRN anxiety  Continue to f/u with Psychology team  Rest of the medical workup as per primary team Mr. Hardy is a 58 year-old man with no formal prior psychiatric h/o and past medical history of HTN, CAD (1 stent in 2009), ICH (2008) presented on 11/1 with abnormal EKG. Patient admitted to [facility] for NSTEMI, s/p intubation and catheterization. Patient is now s/p OHT on 12/25.  Psychiatry follow up was requested as the primary team was concerned about some behavioral/affective disturbances.      1/4: Patient seen at the bedside. Calm, controlled, no signs of acute agitation. No acute behavioral concerns reported overnight. Appeared euthymic on evaluation, denied feeling anxious/depressed. Denied any perceptual disturbances, denied SI/HI/I/P.   Patient currently in agreement to start low dose neuroleptic to prevent exacerbation of affective symptoms    Plan:  Routine observation  DECREASE the dose of Zyprexa 5 mg to 2.5 mg PO QHS (as patient reported being overly sedated) Monitor QTc ( ensure < 500) and VS  Can use Atarax 10 mg PO TID PRN for anxiety; if patient amenable to increase in dose consider Atarax 25 mg PO BID PRN anxiety  Continue to f/u with Psychology team  Rest of the medical workup as per primary team

## 2024-01-04 NOTE — PROGRESS NOTE ADULT - PROBLEM SELECTOR PLAN 5
- pretransplant was noted to have positive BCx staph epi, Enterococcus faecalis and Cutibacterium   - s/p IABP exchange from RFA to LFA on 11/20.  - appreciated transplant ID recs.  - post transplant has been afebrile however was noted to have rise in WBC today  - will plan to send blood cx x 2 and send RVP - pretransplant was noted to have positive BCx staph epi, Enterococcus faecalis and Cutibacterium   - s/p IABP exchange from RFA to LFA on 11/20.  - appreciated transplant ID recs.  - post transplant has been afebrile however was noted to have bump in WBC  - RVP negative  - Blood cx pending  - please send procalcitonin   - low threshold to obtain CT C/A/P

## 2024-01-04 NOTE — PROGRESS NOTE ADULT - ASSESSMENT
57 yo M with HFrEF (LVIDd 6.4 cm, LVEF 32%), CAD s/p PCI (2008), HTN, DMT2 (A1c 8.3%) and CVA s/p TPA (2018), recently treated for PNA who initially presented to [facility] via EMS after syncope reportedly requiring defibrillation. Treated for ACS but left AMA to come to [facility]. On arrival ECG with ST depression in lateral leads. Intubated 11/1 for respiratory failure and was found to have COVID. L/RHC 11/1revealing  of LAD and RCA, elevated filling pressures and CI of 1.5 prompting placement of IABP. Extubated 11/3. IABP was weaned to off 11/7, however the following day developed PMVT cardiac arrest with prompt CPR and defibrillation, started on IV Lidocaine/Amio and IABP ultimately replaced on 11/9. During this admission was found to be bacteremic for which she was treated for Staph epi, Enterococcus faecalis, Cutibacterium with IV abx.  Was listed Status 2, ABO A, PRA 0% (12/12).     A suitable donor was identified and on 12/25, he underwent OHT (ischemic Time: 253min, CMV -/+, Toxo -/-). The following day, extubated and IABP removed. Overall progressing well. He underwent RHC/EMBx with revealed elevated filling pressures and normal cardiac output. His diuretics have been increased and he is responding well. He was noted to have bump in leukocytosis today however remains afebrile and off antibiotics. Will tentatively plan for discharge end of week.   59 yo M with HFrEF (LVIDd 6.4 cm, LVEF 32%), CAD s/p PCI (2008), HTN, DMT2 (A1c 8.3%) and CVA s/p TPA (2018), recently treated for PNA who initially presented to [facility] via EMS after syncope reportedly requiring defibrillation. Treated for ACS but left AMA to come to [facility]. On arrival ECG with ST depression in lateral leads. Intubated 11/1 for respiratory failure and was found to have COVID. L/RHC 11/1revealing  of LAD and RCA, elevated filling pressures and CI of 1.5 prompting placement of IABP. Extubated 11/3. IABP was weaned to off 11/7, however the following day developed PMVT cardiac arrest with prompt CPR and defibrillation, started on IV Lidocaine/Amio and IABP ultimately replaced on 11/9. During this admission was found to be bacteremic for which she was treated for Staph epi, Enterococcus faecalis, Cutibacterium with IV abx.  Was listed Status 2, ABO A, PRA 0% (12/12).     A suitable donor was identified and on 12/25, he underwent OHT (ischemic Time: 253min, CMV -/+, Toxo -/-). The following day, extubated and IABP removed. Overall progressing well. He underwent RHC/EMBx with revealed elevated filling pressures and normal cardiac output. His diuretics have been increased and he is responding well. He was noted to have bump in leukocytosis today however remains afebrile and off antibiotics. Will tentatively plan for discharge end of week.   59 yo M with HFrEF (LVIDd 6.4 cm, LVEF 32%), CAD s/p PCI (2008), HTN, DMT2 (A1c 8.3%) and CVA s/p TPA (2018), recently treated for PNA who initially presented to [facility] via EMS after syncope reportedly requiring defibrillation. Treated for ACS but left AMA to come to [facility]. On arrival ECG with ST depression in lateral leads. Intubated 11/1 for respiratory failure and was found to have COVID. L/RHC 11/1revealing  of LAD and RCA, elevated filling pressures and CI of 1.5 prompting placement of IABP. Extubated 11/3. IABP was weaned to off 11/7, however the following day developed PMVT cardiac arrest with prompt CPR and defibrillation, started on IV Lidocaine/Amio and IABP ultimately replaced on 11/9. During this admission was found to be bacteremic for which she was treated for Staph epi, Enterococcus faecalis, Cutibacterium with IV abx.  Was listed Status 2, ABO A, PRA 0% (12/12).     A suitable donor was identified and on 12/25, he underwent OHT (ischemic Time: 253min, CMV -/+, Toxo -/-). The following day, extubated and IABP removed. Overall progressing well. He underwent RHC/EMBx with revealed elevated filling pressures and normal cardiac output. His diuretics have been increased and he is responding well. He continues to have elevated WBC however remains afebrile and off antibiotics. Dispo pending for tomorrow vs Monday.    57 yo M with HFrEF (LVIDd 6.4 cm, LVEF 32%), CAD s/p PCI (2008), HTN, DMT2 (A1c 8.3%) and CVA s/p TPA (2018), recently treated for PNA who initially presented to [facility] via EMS after syncope reportedly requiring defibrillation. Treated for ACS but left AMA to come to [facility]. On arrival ECG with ST depression in lateral leads. Intubated 11/1 for respiratory failure and was found to have COVID. L/RHC 11/1revealing  of LAD and RCA, elevated filling pressures and CI of 1.5 prompting placement of IABP. Extubated 11/3. IABP was weaned to off 11/7, however the following day developed PMVT cardiac arrest with prompt CPR and defibrillation, started on IV Lidocaine/Amio and IABP ultimately replaced on 11/9. During this admission was found to be bacteremic for which she was treated for Staph epi, Enterococcus faecalis, Cutibacterium with IV abx.  Was listed Status 2, ABO A, PRA 0% (12/12).     A suitable donor was identified and on 12/25, he underwent OHT (ischemic Time: 253min, CMV -/+, Toxo -/-). The following day, extubated and IABP removed. Overall progressing well. He underwent RHC/EMBx with revealed elevated filling pressures and normal cardiac output. His diuretics have been increased and he is responding well. He continues to have elevated WBC however remains afebrile and off antibiotics. Dispo pending for tomorrow vs Monday.

## 2024-01-05 LAB
ANION GAP SERPL CALC-SCNC: 12 MMOL/L — SIGNIFICANT CHANGE UP (ref 5–17)
ANION GAP SERPL CALC-SCNC: 12 MMOL/L — SIGNIFICANT CHANGE UP (ref 5–17)
BASOPHILS # BLD AUTO: 0.01 K/UL — SIGNIFICANT CHANGE UP (ref 0–0.2)
BASOPHILS # BLD AUTO: 0.01 K/UL — SIGNIFICANT CHANGE UP (ref 0–0.2)
BASOPHILS NFR BLD AUTO: 0.1 % — SIGNIFICANT CHANGE UP (ref 0–2)
BASOPHILS NFR BLD AUTO: 0.1 % — SIGNIFICANT CHANGE UP (ref 0–2)
BUN SERPL-MCNC: 58 MG/DL — HIGH (ref 7–23)
BUN SERPL-MCNC: 58 MG/DL — HIGH (ref 7–23)
CALCIUM SERPL-MCNC: 9.9 MG/DL — SIGNIFICANT CHANGE UP (ref 8.4–10.5)
CALCIUM SERPL-MCNC: 9.9 MG/DL — SIGNIFICANT CHANGE UP (ref 8.4–10.5)
CHLORIDE SERPL-SCNC: 97 MMOL/L — SIGNIFICANT CHANGE UP (ref 96–108)
CHLORIDE SERPL-SCNC: 97 MMOL/L — SIGNIFICANT CHANGE UP (ref 96–108)
CO2 SERPL-SCNC: 24 MMOL/L — SIGNIFICANT CHANGE UP (ref 22–31)
CO2 SERPL-SCNC: 24 MMOL/L — SIGNIFICANT CHANGE UP (ref 22–31)
CREAT SERPL-MCNC: 1.23 MG/DL — SIGNIFICANT CHANGE UP (ref 0.5–1.3)
CREAT SERPL-MCNC: 1.23 MG/DL — SIGNIFICANT CHANGE UP (ref 0.5–1.3)
EGFR: 68 ML/MIN/1.73M2 — SIGNIFICANT CHANGE UP
EGFR: 68 ML/MIN/1.73M2 — SIGNIFICANT CHANGE UP
EOSINOPHIL # BLD AUTO: 0.25 K/UL — SIGNIFICANT CHANGE UP (ref 0–0.5)
EOSINOPHIL # BLD AUTO: 0.25 K/UL — SIGNIFICANT CHANGE UP (ref 0–0.5)
EOSINOPHIL NFR BLD AUTO: 1.6 % — SIGNIFICANT CHANGE UP (ref 0–6)
EOSINOPHIL NFR BLD AUTO: 1.6 % — SIGNIFICANT CHANGE UP (ref 0–6)
GLUCOSE BLDC GLUCOMTR-MCNC: 103 MG/DL — HIGH (ref 70–99)
GLUCOSE BLDC GLUCOMTR-MCNC: 103 MG/DL — HIGH (ref 70–99)
GLUCOSE BLDC GLUCOMTR-MCNC: 152 MG/DL — HIGH (ref 70–99)
GLUCOSE BLDC GLUCOMTR-MCNC: 152 MG/DL — HIGH (ref 70–99)
GLUCOSE BLDC GLUCOMTR-MCNC: 177 MG/DL — HIGH (ref 70–99)
GLUCOSE BLDC GLUCOMTR-MCNC: 177 MG/DL — HIGH (ref 70–99)
GLUCOSE BLDC GLUCOMTR-MCNC: 96 MG/DL — SIGNIFICANT CHANGE UP (ref 70–99)
GLUCOSE BLDC GLUCOMTR-MCNC: 96 MG/DL — SIGNIFICANT CHANGE UP (ref 70–99)
GLUCOSE BLDC GLUCOMTR-MCNC: 99 MG/DL — SIGNIFICANT CHANGE UP (ref 70–99)
GLUCOSE BLDC GLUCOMTR-MCNC: 99 MG/DL — SIGNIFICANT CHANGE UP (ref 70–99)
GLUCOSE SERPL-MCNC: 121 MG/DL — HIGH (ref 70–99)
GLUCOSE SERPL-MCNC: 121 MG/DL — HIGH (ref 70–99)
HCT VFR BLD CALC: 34.1 % — LOW (ref 39–50)
HCT VFR BLD CALC: 34.1 % — LOW (ref 39–50)
HGB BLD-MCNC: 11.6 G/DL — LOW (ref 13–17)
HGB BLD-MCNC: 11.6 G/DL — LOW (ref 13–17)
IMM GRANULOCYTES NFR BLD AUTO: 0.9 % — SIGNIFICANT CHANGE UP (ref 0–0.9)
IMM GRANULOCYTES NFR BLD AUTO: 0.9 % — SIGNIFICANT CHANGE UP (ref 0–0.9)
LYMPHOCYTES # BLD AUTO: 12.8 % — LOW (ref 13–44)
LYMPHOCYTES # BLD AUTO: 12.8 % — LOW (ref 13–44)
LYMPHOCYTES # BLD AUTO: 2.05 K/UL — SIGNIFICANT CHANGE UP (ref 1–3.3)
LYMPHOCYTES # BLD AUTO: 2.05 K/UL — SIGNIFICANT CHANGE UP (ref 1–3.3)
MCHC RBC-ENTMCNC: 30 PG — SIGNIFICANT CHANGE UP (ref 27–34)
MCHC RBC-ENTMCNC: 30 PG — SIGNIFICANT CHANGE UP (ref 27–34)
MCHC RBC-ENTMCNC: 34 GM/DL — SIGNIFICANT CHANGE UP (ref 32–36)
MCHC RBC-ENTMCNC: 34 GM/DL — SIGNIFICANT CHANGE UP (ref 32–36)
MCV RBC AUTO: 88.1 FL — SIGNIFICANT CHANGE UP (ref 80–100)
MCV RBC AUTO: 88.1 FL — SIGNIFICANT CHANGE UP (ref 80–100)
MONOCYTES # BLD AUTO: 0.72 K/UL — SIGNIFICANT CHANGE UP (ref 0–0.9)
MONOCYTES # BLD AUTO: 0.72 K/UL — SIGNIFICANT CHANGE UP (ref 0–0.9)
MONOCYTES NFR BLD AUTO: 4.5 % — SIGNIFICANT CHANGE UP (ref 2–14)
MONOCYTES NFR BLD AUTO: 4.5 % — SIGNIFICANT CHANGE UP (ref 2–14)
NEUTROPHILS # BLD AUTO: 12.86 K/UL — HIGH (ref 1.8–7.4)
NEUTROPHILS # BLD AUTO: 12.86 K/UL — HIGH (ref 1.8–7.4)
NEUTROPHILS NFR BLD AUTO: 80.1 % — HIGH (ref 43–77)
NEUTROPHILS NFR BLD AUTO: 80.1 % — HIGH (ref 43–77)
NRBC # BLD: 0 /100 WBCS — SIGNIFICANT CHANGE UP (ref 0–0)
NRBC # BLD: 0 /100 WBCS — SIGNIFICANT CHANGE UP (ref 0–0)
PLATELET # BLD AUTO: 212 K/UL — SIGNIFICANT CHANGE UP (ref 150–400)
PLATELET # BLD AUTO: 212 K/UL — SIGNIFICANT CHANGE UP (ref 150–400)
POTASSIUM SERPL-MCNC: 4.6 MMOL/L — SIGNIFICANT CHANGE UP (ref 3.5–5.3)
POTASSIUM SERPL-MCNC: 4.6 MMOL/L — SIGNIFICANT CHANGE UP (ref 3.5–5.3)
POTASSIUM SERPL-SCNC: 4.6 MMOL/L — SIGNIFICANT CHANGE UP (ref 3.5–5.3)
POTASSIUM SERPL-SCNC: 4.6 MMOL/L — SIGNIFICANT CHANGE UP (ref 3.5–5.3)
PROCALCITONIN SERPL-MCNC: 0.09 NG/ML — SIGNIFICANT CHANGE UP (ref 0.02–0.1)
PROCALCITONIN SERPL-MCNC: 0.09 NG/ML — SIGNIFICANT CHANGE UP (ref 0.02–0.1)
RBC # BLD: 3.87 M/UL — LOW (ref 4.2–5.8)
RBC # BLD: 3.87 M/UL — LOW (ref 4.2–5.8)
RBC # FLD: 15.9 % — HIGH (ref 10.3–14.5)
RBC # FLD: 15.9 % — HIGH (ref 10.3–14.5)
SODIUM SERPL-SCNC: 133 MMOL/L — LOW (ref 135–145)
SODIUM SERPL-SCNC: 133 MMOL/L — LOW (ref 135–145)
TACROLIMUS SERPL-MCNC: 8.2 NG/ML — SIGNIFICANT CHANGE UP
TACROLIMUS SERPL-MCNC: 8.2 NG/ML — SIGNIFICANT CHANGE UP
WBC # BLD: 16.03 K/UL — HIGH (ref 3.8–10.5)
WBC # BLD: 16.03 K/UL — HIGH (ref 3.8–10.5)
WBC # FLD AUTO: 16.03 K/UL — HIGH (ref 3.8–10.5)
WBC # FLD AUTO: 16.03 K/UL — HIGH (ref 3.8–10.5)

## 2024-01-05 PROCEDURE — 99233 SBSQ HOSP IP/OBS HIGH 50: CPT

## 2024-01-05 PROCEDURE — 74176 CT ABD & PELVIS W/O CONTRAST: CPT | Mod: 26

## 2024-01-05 PROCEDURE — 99234 HOSP IP/OBS SM DT SF/LOW 45: CPT

## 2024-01-05 PROCEDURE — 99232 SBSQ HOSP IP/OBS MODERATE 35: CPT

## 2024-01-05 PROCEDURE — 71250 CT THORAX DX C-: CPT | Mod: 26

## 2024-01-05 RX ORDER — TACROLIMUS 5 MG/1
6.5 CAPSULE ORAL
Refills: 0 | Status: DISCONTINUED | OUTPATIENT
Start: 2024-01-05 | End: 2024-01-08

## 2024-01-05 RX ORDER — ACETAMINOPHEN 500 MG
1000 TABLET ORAL ONCE
Refills: 0 | Status: COMPLETED | OUTPATIENT
Start: 2024-01-05 | End: 2024-01-05

## 2024-01-05 RX ORDER — BUMETANIDE 0.25 MG/ML
2 INJECTION INTRAMUSCULAR; INTRAVENOUS EVERY 12 HOURS
Refills: 0 | Status: DISCONTINUED | OUTPATIENT
Start: 2024-01-05 | End: 2024-01-07

## 2024-01-05 RX ORDER — SODIUM CHLORIDE 5 G/100ML
150 INJECTION, SOLUTION INTRAVENOUS
Refills: 0 | Status: COMPLETED | OUTPATIENT
Start: 2024-01-05 | End: 2024-01-05

## 2024-01-05 RX ORDER — MULTIVIT WITH MIN/MFOLATE/K2 340-15/3 G
150 POWDER (GRAM) ORAL ONCE
Refills: 0 | Status: COMPLETED | OUTPATIENT
Start: 2024-01-05 | End: 2024-01-05

## 2024-01-05 RX ORDER — INSULIN GLARGINE 100 [IU]/ML
33 INJECTION, SOLUTION SUBCUTANEOUS AT BEDTIME
Refills: 0 | Status: DISCONTINUED | OUTPATIENT
Start: 2024-01-05 | End: 2024-01-09

## 2024-01-05 RX ADMIN — Medication 60 MILLIGRAM(S): at 06:15

## 2024-01-05 RX ADMIN — Medication 15 MILLIGRAM(S): at 20:03

## 2024-01-05 RX ADMIN — Medication 15 MILLIGRAM(S): at 08:40

## 2024-01-05 RX ADMIN — Medication 60 MILLIGRAM(S): at 17:57

## 2024-01-05 RX ADMIN — Medication 100 MILLIGRAM(S): at 21:35

## 2024-01-05 RX ADMIN — Medication 17 UNIT(S): at 17:57

## 2024-01-05 RX ADMIN — MAGNESIUM OXIDE 400 MG ORAL TABLET 400 MILLIGRAM(S): 241.3 TABLET ORAL at 08:41

## 2024-01-05 RX ADMIN — PANTOPRAZOLE SODIUM 40 MILLIGRAM(S): 20 TABLET, DELAYED RELEASE ORAL at 08:40

## 2024-01-05 RX ADMIN — BUMETANIDE 1 MILLIGRAM(S): 0.25 INJECTION INTRAMUSCULAR; INTRAVENOUS at 06:14

## 2024-01-05 RX ADMIN — Medication 2: at 13:21

## 2024-01-05 RX ADMIN — Medication 10 MILLIGRAM(S): at 23:37

## 2024-01-05 RX ADMIN — BUDESONIDE AND FORMOTEROL FUMARATE DIHYDRATE 1 PUFF(S): 160; 4.5 AEROSOL RESPIRATORY (INHALATION) at 08:41

## 2024-01-05 RX ADMIN — MYCOPHENOLATE MOFETIL 1000 MILLIGRAM(S): 250 CAPSULE ORAL at 20:03

## 2024-01-05 RX ADMIN — INSULIN GLARGINE 33 UNIT(S): 100 INJECTION, SOLUTION SUBCUTANEOUS at 21:36

## 2024-01-05 RX ADMIN — Medication 1 TABLET(S): at 15:31

## 2024-01-05 RX ADMIN — Medication 100 MILLIGRAM(S): at 15:31

## 2024-01-05 RX ADMIN — VALGANCICLOVIR 450 MILLIGRAM(S): 450 TABLET, FILM COATED ORAL at 20:01

## 2024-01-05 RX ADMIN — VALGANCICLOVIR 450 MILLIGRAM(S): 450 TABLET, FILM COATED ORAL at 08:42

## 2024-01-05 RX ADMIN — BUDESONIDE AND FORMOTEROL FUMARATE DIHYDRATE 1 PUFF(S): 160; 4.5 AEROSOL RESPIRATORY (INHALATION) at 17:56

## 2024-01-05 RX ADMIN — HEPARIN SODIUM 5000 UNIT(S): 5000 INJECTION INTRAVENOUS; SUBCUTANEOUS at 15:31

## 2024-01-05 RX ADMIN — Medication 20 UNIT(S): at 09:22

## 2024-01-05 RX ADMIN — HEPARIN SODIUM 5000 UNIT(S): 5000 INJECTION INTRAVENOUS; SUBCUTANEOUS at 21:35

## 2024-01-05 RX ADMIN — Medication 2: at 17:56

## 2024-01-05 RX ADMIN — TACROLIMUS 6.5 MILLIGRAM(S): 5 CAPSULE ORAL at 20:02

## 2024-01-05 RX ADMIN — Medication 150 MILLILITER(S): at 15:41

## 2024-01-05 RX ADMIN — SODIUM CHLORIDE 300 MILLILITER(S): 5 INJECTION, SOLUTION INTRAVENOUS at 13:22

## 2024-01-05 RX ADMIN — MAGNESIUM OXIDE 400 MG ORAL TABLET 400 MILLIGRAM(S): 241.3 TABLET ORAL at 13:25

## 2024-01-05 RX ADMIN — MAGNESIUM OXIDE 400 MG ORAL TABLET 400 MILLIGRAM(S): 241.3 TABLET ORAL at 17:57

## 2024-01-05 RX ADMIN — TACROLIMUS 5.5 MILLIGRAM(S): 5 CAPSULE ORAL at 08:41

## 2024-01-05 RX ADMIN — OLANZAPINE 5 MILLIGRAM(S): 15 TABLET, FILM COATED ORAL at 21:35

## 2024-01-05 RX ADMIN — BUMETANIDE 2 MILLIGRAM(S): 0.25 INJECTION INTRAMUSCULAR; INTRAVENOUS at 17:56

## 2024-01-05 RX ADMIN — Medication 100 MILLIGRAM(S): at 06:15

## 2024-01-05 RX ADMIN — Medication 17 UNIT(S): at 13:21

## 2024-01-05 RX ADMIN — FLUCONAZOLE 100 MILLIGRAM(S): 150 TABLET ORAL at 13:22

## 2024-01-05 RX ADMIN — MYCOPHENOLATE MOFETIL 1000 MILLIGRAM(S): 250 CAPSULE ORAL at 08:41

## 2024-01-05 RX ADMIN — HEPARIN SODIUM 5000 UNIT(S): 5000 INJECTION INTRAVENOUS; SUBCUTANEOUS at 06:14

## 2024-01-05 NOTE — PROGRESS NOTE ADULT - PROBLEM SELECTOR PLAN 5
- pretransplant was noted to have positive BCx staph epi, Enterococcus faecalis and Cutibacterium   - s/p IABP exchange from RFA to LFA on 11/20.  - appreciated transplant ID recs.  - post transplant has been afebrile however was noted to have bump in WBC  - RVP negative  - Blood cx pending  - please send procalcitonin   - low threshold to obtain CT C/A/P - pretransplant was noted to have positive BCx staph epi, Enterococcus faecalis and Cutibacterium   - s/p IABP exchange from RFA to LFA on 11/20.  - appreciated transplant ID recs.  - post transplant has been afebrile however was noted to have bump in WBC  - RVP negative  - Blood cx NGTD  - procalcitonin normal  - awaiting read from CT C/A/P from 1/5

## 2024-01-05 NOTE — PROGRESS NOTE ADULT - PROBLEM SELECTOR PLAN 2
·  Problem: RAIC (acute kidney injury).   ·  Plan: - pre transplant had baseline Cr 1.2, peaked to 4, now at relative baseline  - cardiorenal following  - diuretics as stated above ·  Problem: ARIC (acute kidney injury).   ·  Plan: - pre transplant had baseline Cr 1.2, peaked to 4, now at relative baseline  - cardiorenal following  - diuretics as stated above

## 2024-01-05 NOTE — PROGRESS NOTE ADULT - SUBJECTIVE AND OBJECTIVE BOX
seen earlier today     Chief Complaint: Diabetes Mellitus follow up    INTERVAL HX: patient asking for discharge today per transplant team patient is remaining admitted 2/2 WBC , notified transplant team who were on floor and went to speak to patient again, BG now tightly controlled, tolerating PO      Review of Systems:  General: As above  GI: No nausea, vomiting  Endocrine: no  S&Sx of hypoglycemia    Allergies    penicillins (Unknown)    Intolerances      MEDICATIONS  (STANDING):  acetaminophen   IVPB .. 1000 milliGRAM(s) IV Intermittent once  budesonide  80 MICROgram(s)/formoterol 4.5 MICROgram(s) Inhaler 1 Puff(s) Inhalation two times a day  buMETAnide 2 milliGRAM(s) Oral every 12 hours  chlorhexidine 4% Liquid 1 Application(s) Topical <User Schedule>  dextrose 5%. 1000 milliLiter(s) (50 mL/Hr) IV Continuous <Continuous>  dextrose 5%. 1000 milliLiter(s) (100 mL/Hr) IV Continuous <Continuous>  dextrose 50% Injectable 50 milliLiter(s) IV Push every 15 minutes  fluconAZOLE   Tablet 100 milliGRAM(s) Oral <User Schedule>  glucagon  Injectable 1 milliGRAM(s) IntraMuscular once  heparin   Injectable 5000 Unit(s) SubCutaneous every 8 hours  hydrALAZINE 100 milliGRAM(s) Oral every 8 hours  insulin glargine Injectable (LANTUS) 33 Unit(s) SubCutaneous at bedtime  insulin lispro (ADMELOG) corrective regimen sliding scale   SubCutaneous three times a day before meals  insulin lispro (ADMELOG) corrective regimen sliding scale   SubCutaneous <User Schedule>  insulin lispro Injectable (ADMELOG) 17 Unit(s) SubCutaneous before lunch  insulin lispro Injectable (ADMELOG) 20 Unit(s) SubCutaneous before breakfast  insulin lispro Injectable (ADMELOG) 17 Unit(s) SubCutaneous before dinner  insulin regular Infusion 3 Unit(s)/Hr (3 mL/Hr) IV Continuous <Continuous>  lidocaine   4% Patch 3 Patch Transdermal daily  magnesium citrate Oral Solution 150 milliLiter(s) Oral once  magnesium oxide 400 milliGRAM(s) Oral three times a day with meals  melatonin 5 milliGRAM(s) Oral at bedtime  mycophenolate mofetil 1000 milliGRAM(s) Oral every 12 hours  NIFEdipine XL 60 milliGRAM(s) Oral every 12 hours  OLANZapine 5 milliGRAM(s) Oral at bedtime  pantoprazole    Tablet 40 milliGRAM(s) Oral every 24 hours  polyethylene glycol 3350 17 Gram(s) Oral daily  predniSONE   Tablet 15 milliGRAM(s) Oral every 12 hours  senna 2 Tablet(s) Oral at bedtime  tacrolimus 5.5 milliGRAM(s) Oral <User Schedule>  trimethoprim   80 mG/sulfamethoxazole 400 mG 1 Tablet(s) Oral daily  valGANciclovir 450 milliGRAM(s) Oral every 12 hours        insulin glargine Injectable (LANTUS)   35 Unit(s) SubCutaneous (01-04-24 @ 16:17)    insulin lispro (ADMELOG) corrective regimen sliding scale   2 Unit(s) SubCutaneous (01-05-24 @ 13:21)   4  SubCutaneous (01-04-24 @ 18:11)    insulin lispro Injectable (ADMELOG)   17 Unit(s) SubCutaneous (01-05-24 @ 13:21)    insulin lispro Injectable (ADMELOG)   20 Unit(s) SubCutaneous (01-05-24 @ 09:22)    insulin lispro Injectable (ADMELOG)   17 Unit(s) SubCutaneous (01-04-24 @ 18:10)    predniSONE   Tablet   15 milliGRAM(s) Oral (01-05-24 @ 08:40)   15 milliGRAM(s) Oral (01-04-24 @ 20:02)        PHYSICAL EXAM:  VITALS: T(C): 36.6 (01-05-24 @ 11:38)  T(F): 97.9 (01-05-24 @ 11:38), Max: 97.9 (01-04-24 @ 19:43)  HR: 73 (01-05-24 @ 11:38) (73 - 103)  BP: 97/63 (01-05-24 @ 11:38) (97/63 - 129/80)  RR:  (18 - 18)  SpO2:  (97% - 98%)  Wt(kg): --  GENERAL: NAD msi cdi  Respiratory: Respirations unlabored   Extremities: Warm, no edema  NEURO: Alert , appropriate     LABS:  POCT Blood Glucose.: 152 mg/dL (01-05-24 @ 13:06)  POCT Blood Glucose.: 103 mg/dL (01-05-24 @ 07:41)  POCT Blood Glucose.: 99 mg/dL (01-05-24 @ 02:12)  POCT Blood Glucose.: 93 mg/dL (01-04-24 @ 21:30)  POCT Blood Glucose.: 242 mg/dL (01-04-24 @ 16:11)  POCT Blood Glucose.: 214 mg/dL (01-04-24 @ 11:24)  POCT Blood Glucose.: 285 mg/dL (01-04-24 @ 07:55)  POCT Blood Glucose.: 180 mg/dL (01-04-24 @ 02:05)  POCT Blood Glucose.: 82 mg/dL (01-03-24 @ 21:40)  POCT Blood Glucose.: 111 mg/dL (01-03-24 @ 18:01)  POCT Blood Glucose.: 204 mg/dL (01-03-24 @ 11:25)  POCT Blood Glucose.: 173 mg/dL (01-03-24 @ 07:28)  POCT Blood Glucose.: 129 mg/dL (01-03-24 @ 04:40)  POCT Blood Glucose.: 184 mg/dL (01-03-24 @ 04:37)  POCT Blood Glucose.: 99 mg/dL (01-03-24 @ 03:54)  POCT Blood Glucose.: 113 mg/dL (01-03-24 @ 02:57)  POCT Blood Glucose.: 148 mg/dL (01-03-24 @ 02:03)  POCT Blood Glucose.: 175 mg/dL (01-03-24 @ 00:58)  POCT Blood Glucose.: 218 mg/dL (01-02-24 @ 23:53)  POCT Blood Glucose.: 281 mg/dL (01-02-24 @ 22:58)  POCT Blood Glucose.: 393 mg/dL (01-02-24 @ 21:40)  POCT Blood Glucose.: 267 mg/dL (01-02-24 @ 16:43)                          11.6   16.03 )-----------( 212      ( 05 Jan 2024 07:06 )             34.1     01-05    133<L>  |  97  |  58<H>  ----------------------------<  121<H>  4.6   |  24  |  1.23    Ca    9.9      05 Jan 2024 07:05  Phos  3.5     01-04  Mg     2.0     01-04    TPro  6.4  /  Alb  4.0  /  TBili  0.3  /  DBili  x   /  AST  23  /  ALT  55<H>  /  AlkPhos  54  01-04    eGFR: 68 mL/min/1.73m2 (05 Jan 2024 07:05)    11-10 Chol 130 Direct LDL -- LDL calculated 75 HDL 37<L> Trig 95, 11-03 Chol 198 Direct LDL -- LDL calculated 114<H> HDL 24<L> Trig 344<H>  Thyroid Function Tests:      A1C with Estimated Average Glucose Result: 6.1 % (01-02-24 @ 07:26)  A1C with Estimated Average Glucose Result: 8.3 % (11-01-23 @ 06:14)    Estimated Average Glucose: 128 mg/dL (01-02-24 @ 07:26)  Estimated Average Glucose: 192 mg/dL (11-01-23 @ 06:14)        Diet, Consistent Carbohydrate/No Snacks:   1000mL Fluid Restriction ([id number]) (01-04-24 @ 13:57) [Active]              seen earlier today     Chief Complaint: Diabetes Mellitus follow up    INTERVAL HX: patient asking for discharge today per transplant team patient is remaining admitted 2/2 WBC , notified transplant team who were on floor and went to speak to patient again, BG now tightly controlled, tolerating PO      Review of Systems:  General: As above  GI: No nausea, vomiting  Endocrine: no  S&Sx of hypoglycemia    Allergies    penicillins (Unknown)    Intolerances      MEDICATIONS  (STANDING):  acetaminophen   IVPB .. 1000 milliGRAM(s) IV Intermittent once  budesonide  80 MICROgram(s)/formoterol 4.5 MICROgram(s) Inhaler 1 Puff(s) Inhalation two times a day  buMETAnide 2 milliGRAM(s) Oral every 12 hours  chlorhexidine 4% Liquid 1 Application(s) Topical <User Schedule>  dextrose 5%. 1000 milliLiter(s) (50 mL/Hr) IV Continuous <Continuous>  dextrose 5%. 1000 milliLiter(s) (100 mL/Hr) IV Continuous <Continuous>  dextrose 50% Injectable 50 milliLiter(s) IV Push every 15 minutes  fluconAZOLE   Tablet 100 milliGRAM(s) Oral <User Schedule>  glucagon  Injectable 1 milliGRAM(s) IntraMuscular once  heparin   Injectable 5000 Unit(s) SubCutaneous every 8 hours  hydrALAZINE 100 milliGRAM(s) Oral every 8 hours  insulin glargine Injectable (LANTUS) 33 Unit(s) SubCutaneous at bedtime  insulin lispro (ADMELOG) corrective regimen sliding scale   SubCutaneous three times a day before meals  insulin lispro (ADMELOG) corrective regimen sliding scale   SubCutaneous <User Schedule>  insulin lispro Injectable (ADMELOG) 17 Unit(s) SubCutaneous before lunch  insulin lispro Injectable (ADMELOG) 20 Unit(s) SubCutaneous before breakfast  insulin lispro Injectable (ADMELOG) 17 Unit(s) SubCutaneous before dinner  insulin regular Infusion 3 Unit(s)/Hr (3 mL/Hr) IV Continuous <Continuous>  lidocaine   4% Patch 3 Patch Transdermal daily  magnesium citrate Oral Solution 150 milliLiter(s) Oral once  magnesium oxide 400 milliGRAM(s) Oral three times a day with meals  melatonin 5 milliGRAM(s) Oral at bedtime  mycophenolate mofetil 1000 milliGRAM(s) Oral every 12 hours  NIFEdipine XL 60 milliGRAM(s) Oral every 12 hours  OLANZapine 5 milliGRAM(s) Oral at bedtime  pantoprazole    Tablet 40 milliGRAM(s) Oral every 24 hours  polyethylene glycol 3350 17 Gram(s) Oral daily  predniSONE   Tablet 15 milliGRAM(s) Oral every 12 hours  senna 2 Tablet(s) Oral at bedtime  tacrolimus 5.5 milliGRAM(s) Oral <User Schedule>  trimethoprim   80 mG/sulfamethoxazole 400 mG 1 Tablet(s) Oral daily  valGANciclovir 450 milliGRAM(s) Oral every 12 hours        insulin glargine Injectable (LANTUS)   35 Unit(s) SubCutaneous (01-04-24 @ 16:17)    insulin lispro (ADMELOG) corrective regimen sliding scale   2 Unit(s) SubCutaneous (01-05-24 @ 13:21)   4  SubCutaneous (01-04-24 @ 18:11)    insulin lispro Injectable (ADMELOG)   17 Unit(s) SubCutaneous (01-05-24 @ 13:21)    insulin lispro Injectable (ADMELOG)   20 Unit(s) SubCutaneous (01-05-24 @ 09:22)    insulin lispro Injectable (ADMELOG)   17 Unit(s) SubCutaneous (01-04-24 @ 18:10)    predniSONE   Tablet   15 milliGRAM(s) Oral (01-05-24 @ 08:40)   15 milliGRAM(s) Oral (01-04-24 @ 20:02)        PHYSICAL EXAM:  VITALS: T(C): 36.6 (01-05-24 @ 11:38)  T(F): 97.9 (01-05-24 @ 11:38), Max: 97.9 (01-04-24 @ 19:43)  HR: 73 (01-05-24 @ 11:38) (73 - 103)  BP: 97/63 (01-05-24 @ 11:38) (97/63 - 129/80)  RR:  (18 - 18)  SpO2:  (97% - 98%)  Wt(kg): --  GENERAL: NAD msi cdi  Respiratory: Respirations unlabored   Extremities: Warm, no edema  NEURO: Alert , appropriate     LABS:  POCT Blood Glucose.: 152 mg/dL (01-05-24 @ 13:06)  POCT Blood Glucose.: 103 mg/dL (01-05-24 @ 07:41)  POCT Blood Glucose.: 99 mg/dL (01-05-24 @ 02:12)  POCT Blood Glucose.: 93 mg/dL (01-04-24 @ 21:30)  POCT Blood Glucose.: 242 mg/dL (01-04-24 @ 16:11)  POCT Blood Glucose.: 214 mg/dL (01-04-24 @ 11:24)  POCT Blood Glucose.: 285 mg/dL (01-04-24 @ 07:55)  POCT Blood Glucose.: 180 mg/dL (01-04-24 @ 02:05)  POCT Blood Glucose.: 82 mg/dL (01-03-24 @ 21:40)  POCT Blood Glucose.: 111 mg/dL (01-03-24 @ 18:01)  POCT Blood Glucose.: 204 mg/dL (01-03-24 @ 11:25)  POCT Blood Glucose.: 173 mg/dL (01-03-24 @ 07:28)  POCT Blood Glucose.: 129 mg/dL (01-03-24 @ 04:40)  POCT Blood Glucose.: 184 mg/dL (01-03-24 @ 04:37)  POCT Blood Glucose.: 99 mg/dL (01-03-24 @ 03:54)  POCT Blood Glucose.: 113 mg/dL (01-03-24 @ 02:57)  POCT Blood Glucose.: 148 mg/dL (01-03-24 @ 02:03)  POCT Blood Glucose.: 175 mg/dL (01-03-24 @ 00:58)  POCT Blood Glucose.: 218 mg/dL (01-02-24 @ 23:53)  POCT Blood Glucose.: 281 mg/dL (01-02-24 @ 22:58)  POCT Blood Glucose.: 393 mg/dL (01-02-24 @ 21:40)  POCT Blood Glucose.: 267 mg/dL (01-02-24 @ 16:43)                          11.6   16.03 )-----------( 212      ( 05 Jan 2024 07:06 )             34.1     01-05    133<L>  |  97  |  58<H>  ----------------------------<  121<H>  4.6   |  24  |  1.23    Ca    9.9      05 Jan 2024 07:05  Phos  3.5     01-04  Mg     2.0     01-04    TPro  6.4  /  Alb  4.0  /  TBili  0.3  /  DBili  x   /  AST  23  /  ALT  55<H>  /  AlkPhos  54  01-04    eGFR: 68 mL/min/1.73m2 (05 Jan 2024 07:05)    11-10 Chol 130 Direct LDL -- LDL calculated 75 HDL 37<L> Trig 95, 11-03 Chol 198 Direct LDL -- LDL calculated 114<H> HDL 24<L> Trig 344<H>  Thyroid Function Tests:      A1C with Estimated Average Glucose Result: 6.1 % (01-02-24 @ 07:26)  A1C with Estimated Average Glucose Result: 8.3 % (11-01-23 @ 06:14)    Estimated Average Glucose: 128 mg/dL (01-02-24 @ 07:26)  Estimated Average Glucose: 192 mg/dL (11-01-23 @ 06:14)        Diet, Consistent Carbohydrate/No Snacks:   1000mL Fluid Restriction ([id number]) (01-04-24 @ 13:57) [Active]

## 2024-01-05 NOTE — PROGRESS NOTE ADULT - ASSESSMENT
59yo M w/ PMHx HTN, CAD s/p stent (2009), ICH (2008), ICM now s/p heart transplant on 12/25/2023. Endocrine consulted for T2DM management.  Tolerating POs with good oral intake, eats foods from home as well. pt was educated on consistent carbohydrate diet and advised to limit snacks between meals. s/p Methylprednisolone taper, now on Prednisone 15 mg BID     Steroid schedule  12/28 Methylprednisolone 32 units BID   12/29 Methylprednisolone 28 units BID  12/30 Methylprednisolone 24 units BID   12/31 Methylprednisolone 20 units BID   1/1 Methylprednisolone 16 units BID   1/2 Methylprednisolone 12 units BID   1/3 Prednisone 15 mg BID ( started on 1/3, PM)     #T2DM (A1c  8.3%), uncontrolled with complications   #Steroid induced hyperglycemia   Home meds: confirmed with patient on 12/28 patient takes dapagliflozin 10mg daily, patient is NOT taking ozempic any longer (has not taken in over 4 months)    Fasting BG tightly controlled prophylactially reduce lantus to prevent hypog lycemia  Steroid - currently on prednisone 15 mg BiD  Recommendations:   - Test BGs ACTID, at HS and 2 am   - Decrease Lantus to 33 units sq qhs  - C/w  premeal Admelog 20-17-15 ( Hold if NPO)   - Continue Moderate correction scale ACTID and sperate Moderate correction scale at HS and 2 am   - recommend Nutrition consult   - Please  keep hypoglycemia protocol in place   - Carb consistent diet   - please inform endocrine if any changes to steroid taper as this will impact insulin requirements   - BG goal 100 to 180mg/dl   - Please educate pt on insulin pen use and glucose check    - DISCHARGE RECOMMENDATIONS:   Discharge planning on basal and bolus regimen, dose TBD based on insulin requirements and steroid regimen  Please Send Rx for Basal insulin pen ( Basaglar/ Lantus/ Tresiba), bolus insulin pen( Admelog/ Humalog/ Novolog) and pen needles   pt should monitor BGs ACTID and at HS and contact provider if BG< 70, > 400, or remain > 200   Please send Rx for Glucometer, lancets, strips and alcohol pads  Please arrange home care as he is new to insulin injections  - OUT-PATIENT FOLLOW UP: Patient should follow up with endocrinologist.   Can follow up with [facility] - [address] [place] [address], [address]. [place] [address]. Tel) [phone]   Email sent to  today     #HLD  - patient not on statin  - goal LDL in diabetes mellitus is <70  - outpatient fasting lipid profile     #HTN  - patient on losartan 25mg daily at home  - goal BP in diabetes mellitus is <130/80  - defer to primary team for management    discussed with patient and transplant team   Contact via Microsoft Teams during business hours  To reach covering provider access AMION via sunrise tools  For Urgent matters/after-hours/weekends/holidays please page endocrine fellow on call   For nonurgent matters please email [email or web address]    Please note that this patient may be followed by different provider tomorrow.  Notify endocrine 24 hours prior to discharge for final recommendations 57yo M w/ PMHx HTN, CAD s/p stent (2009), ICH (2008), ICM now s/p heart transplant on 12/25/2023. Endocrine consulted for T2DM management.  Tolerating POs with good oral intake, eats foods from home as well. pt was educated on consistent carbohydrate diet and advised to limit snacks between meals. s/p Methylprednisolone taper, now on Prednisone 15 mg BID     Steroid schedule  12/28 Methylprednisolone 32 units BID   12/29 Methylprednisolone 28 units BID  12/30 Methylprednisolone 24 units BID   12/31 Methylprednisolone 20 units BID   1/1 Methylprednisolone 16 units BID   1/2 Methylprednisolone 12 units BID   1/3 Prednisone 15 mg BID ( started on 1/3, PM)     #T2DM (A1c  8.3%), uncontrolled with complications   #Steroid induced hyperglycemia   Home meds: confirmed with patient on 12/28 patient takes dapagliflozin 10mg daily, patient is NOT taking ozempic any longer (has not taken in over 4 months)    Fasting BG tightly controlled prophylactially reduce lantus to prevent hypog lycemia  Steroid - currently on prednisone 15 mg BiD  Recommendations:   - Test BGs ACTID, at HS and 2 am   - Decrease Lantus to 33 units sq qhs  - C/w  premeal Admelog 20-17-15 ( Hold if NPO)   - Continue Moderate correction scale ACTID and sperate Moderate correction scale at HS and 2 am   - recommend Nutrition consult   - Please  keep hypoglycemia protocol in place   - Carb consistent diet   - please inform endocrine if any changes to steroid taper as this will impact insulin requirements   - BG goal 100 to 180mg/dl   - Please educate pt on insulin pen use and glucose check    - DISCHARGE RECOMMENDATIONS:   Discharge planning on basal and bolus regimen, dose TBD based on insulin requirements and steroid regimen  Please Send Rx for Basal insulin pen ( Basaglar/ Lantus/ Tresiba), bolus insulin pen( Admelog/ Humalog/ Novolog) and pen needles   pt should monitor BGs ACTID and at HS and contact provider if BG< 70, > 400, or remain > 200   Please send Rx for Glucometer, lancets, strips and alcohol pads  Please arrange home care as he is new to insulin injections  - OUT-PATIENT FOLLOW UP: Patient should follow up with endocrinologist.   Can follow up with [facility] - [address] [place] [address], [address]. [place] [address]. Tel) [phone]   Email sent to  today     #HLD  - patient not on statin  - goal LDL in diabetes mellitus is <70  - outpatient fasting lipid profile     #HTN  - patient on losartan 25mg daily at home  - goal BP in diabetes mellitus is <130/80  - defer to primary team for management    discussed with patient and transplant team   Contact via Microsoft Teams during business hours  To reach covering provider access AMION via sunrise tools  For Urgent matters/after-hours/weekends/holidays please page endocrine fellow on call   For nonurgent matters please email [email or web address]    Please note that this patient may be followed by different provider tomorrow.  Notify endocrine 24 hours prior to discharge for final recommendations

## 2024-01-05 NOTE — PROGRESS NOTE ADULT - SUBJECTIVE AND OBJECTIVE BOX
Telemetry:      Vital Signs Last 24 Hrs  T(C): 36.6 (01-04-24 @ 19:43), Max: 36.6 (01-04-24 @ 19:43)  T(F): 97.9 (01-04-24 @ 19:43), Max: 97.9 (01-04-24 @ 19:43)  HR: 99 (01-04-24 @ 19:43) (95 - 103)  BP: 129/80 (01-04-24 @ 19:43) (106/68 - 129/80)  RR: 18 (01-04-24 @ 19:43) (18 - 18)  SpO2: 97% (01-04-24 @ 19:43) (97% - 98%)                   01-04 @ 07:01  -  01-05 @ 07:00  --------------------------------------------------------  IN: 1250 mL / OUT: 1340 mL / NET: -90 mL    01-05 @ 07:01  -  01-05 @ 10:23  --------------------------------------------------------  IN: 120 mL / OUT: 0 mL / NET: 120 mL          Daily     Daily         CAPILLARY BLOOD GLUCOSE      POCT Blood Glucose.: 103 mg/dL (05 Jan 2024 07:41)  POCT Blood Glucose.: 99 mg/dL (05 Jan 2024 02:12)  POCT Blood Glucose.: 93 mg/dL (04 Jan 2024 21:30)  POCT Blood Glucose.: 242 mg/dL (04 Jan 2024 16:11)  POCT Blood Glucose.: 214 mg/dL (04 Jan 2024 11:24)          Drains:     MS         [  ] Drainage:                 L Pleural  [  ]  Drainage:                R Pleural  [  ]  Drainage:    Pacing Wires        [  ]   Settings:                                  Isolated  [  ]    Coumadin    [ ] YES          [  ]      NO         Reason:                                 11.6   16.03 )-----------( 212      ( 05 Jan 2024 07:06 )             34.1       01-05    133<L>  |  97  |  58<H>  ----------------------------<  121<H>  4.6   |  24  |  1.23    Ca    9.9      05 Jan 2024 07:05  Phos  3.5     01-04  Mg     2.0     01-04    TPro  6.4  /  Alb  4.0  /  TBili  0.3  /  DBili  x   /  AST  23  /  ALT  55<H>  /  AlkPhos  54  01-04          PHYSICAL EXAM:    Neurology: alert and oriented x 3, nonfocal, no gross deficits    CV :    Sternal Wound :  CDI , Stable    Lungs:    Abdomen: soft, nontender, nondistended, positive bowel sounds, last bowel movement     :               Extremities:               acetaminophen     Tablet .. 650 milliGRAM(s) Oral every 6 hours PRN  acetaminophen   IVPB .. 1000 milliGRAM(s) IV Intermittent once  budesonide  80 MICROgram(s)/formoterol 4.5 MICROgram(s) Inhaler 1 Puff(s) Inhalation two times a day  buMETAnide 1 milliGRAM(s) Oral two times a day  chlorhexidine 4% Liquid 1 Application(s) Topical <User Schedule>  dextrose 5%. 1000 milliLiter(s) IV Continuous <Continuous>  dextrose 5%. 1000 milliLiter(s) IV Continuous <Continuous>  dextrose 50% Injectable 50 milliLiter(s) IV Push every 15 minutes  dextrose Oral Gel 15 Gram(s) Oral once PRN  fluconAZOLE   Tablet 100 milliGRAM(s) Oral <User Schedule>  glucagon  Injectable 1 milliGRAM(s) IntraMuscular once  heparin   Injectable 5000 Unit(s) SubCutaneous every 8 hours  hydrALAZINE 100 milliGRAM(s) Oral every 8 hours  insulin glargine Injectable (LANTUS) 33 Unit(s) SubCutaneous at bedtime  insulin lispro (ADMELOG) corrective regimen sliding scale   SubCutaneous <User Schedule>  insulin lispro (ADMELOG) corrective regimen sliding scale   SubCutaneous three times a day before meals  insulin lispro Injectable (ADMELOG) 20 Unit(s) SubCutaneous before breakfast  insulin lispro Injectable (ADMELOG) 17 Unit(s) SubCutaneous before dinner  insulin lispro Injectable (ADMELOG) 17 Unit(s) SubCutaneous before lunch  insulin regular Infusion 3 Unit(s)/Hr IV Continuous <Continuous>  lidocaine   4% Patch 3 Patch Transdermal daily  magnesium oxide 400 milliGRAM(s) Oral three times a day with meals  melatonin 5 milliGRAM(s) Oral at bedtime  mycophenolate mofetil 1000 milliGRAM(s) Oral every 12 hours  NIFEdipine XL 60 milliGRAM(s) Oral every 12 hours  OLANZapine 5 milliGRAM(s) Oral at bedtime  pantoprazole    Tablet 40 milliGRAM(s) Oral every 24 hours  polyethylene glycol 3350 17 Gram(s) Oral daily  predniSONE   Tablet 15 milliGRAM(s) Oral every 12 hours  senna 2 Tablet(s) Oral at bedtime  tacrolimus 5.5 milliGRAM(s) Oral <User Schedule>  trimethoprim   80 mG/sulfamethoxazole 400 mG 1 Tablet(s) Oral daily  valGANciclovir 450 milliGRAM(s) Oral every 12 hours                              Physical Therapy Rec:   Home  [  ]   Home w/ PT  [  ]  Rehab  [  ]    Discussed with Cardiothoracic Team at AM rounds. S: doing ok.  no bm    Telemetry:  SR     Vital Signs Last 24 Hrs  T(C): 36.6 (01-04-24 @ 19:43), Max: 36.6 (01-04-24 @ 19:43)  T(F): 97.9 (01-04-24 @ 19:43), Max: 97.9 (01-04-24 @ 19:43)  HR: 99 (01-04-24 @ 19:43) (95 - 103)  BP: 129/80 (01-04-24 @ 19:43) (106/68 - 129/80)  RR: 18 (01-04-24 @ 19:43) (18 - 18)  SpO2: 97% (01-04-24 @ 19:43) (97% - 98%)                   01-04 @ 07:01  -  01-05 @ 07:00  --------------------------------------------------------  IN: 1250 mL / OUT: 1340 mL / NET: -90 mL    01-05 @ 07:01  -  01-05 @ 10:23  --------------------------------------------------------  IN: 120 mL / OUT: 0 mL / NET: 120 mL            POCT Blood Glucose.: 103 mg/dL (05 Jan 2024 07:41)  POCT Blood Glucose.: 99 mg/dL (05 Jan 2024 02:12)  POCT Blood Glucose.: 93 mg/dL (04 Jan 2024 21:30)  POCT Blood Glucose.: 242 mg/dL (04 Jan 2024 16:11)  POCT Blood Glucose.: 214 mg/dL (04 Jan 2024 11:24)                                   11.6   16.03 )-----------( 212      ( 05 Jan 2024 07:06 )             34.1       01-05    133<L>  |  97  |  58<H>  ----------------------------<  121<H>  4.6   |  24  |  1.23    Ca    9.9      05 Jan 2024 07:05  Phos  3.5     01-04  Mg     2.0     01-04    TPro  6.4  /  Alb  4.0  /  TBili  0.3  /  DBili  x   /  AST  23  /  ALT  55<H>  /  AlkPhos  54  01-04          PHYSICAL EXAM:    Neurology: alert and oriented x 3, nonfocal, no gross deficits    CV : S1S2 RRR    Sternal Wound :  CDI , Stable    Lungs: clear to ausc.  no w/r/r    Abdomen: soft, nontender, nondistended, positive bowel sounds, no bowel movement         Extremities:  no edema              acetaminophen     Tablet .. 650 milliGRAM(s) Oral every 6 hours PRN  acetaminophen   IVPB .. 1000 milliGRAM(s) IV Intermittent once  budesonide  80 MICROgram(s)/formoterol 4.5 MICROgram(s) Inhaler 1 Puff(s) Inhalation two times a day  buMETAnide 1 milliGRAM(s) Oral two times a day  chlorhexidine 4% Liquid 1 Application(s) Topical <User Schedule>  dextrose 5%. 1000 milliLiter(s) IV Continuous <Continuous>  dextrose 5%. 1000 milliLiter(s) IV Continuous <Continuous>  dextrose 50% Injectable 50 milliLiter(s) IV Push every 15 minutes  dextrose Oral Gel 15 Gram(s) Oral once PRN  fluconAZOLE   Tablet 100 milliGRAM(s) Oral <User Schedule>  glucagon  Injectable 1 milliGRAM(s) IntraMuscular once  heparin   Injectable 5000 Unit(s) SubCutaneous every 8 hours  hydrALAZINE 100 milliGRAM(s) Oral every 8 hours  insulin glargine Injectable (LANTUS) 33 Unit(s) SubCutaneous at bedtime  insulin lispro (ADMELOG) corrective regimen sliding scale   SubCutaneous <User Schedule>  insulin lispro (ADMELOG) corrective regimen sliding scale   SubCutaneous three times a day before meals  insulin lispro Injectable (ADMELOG) 20 Unit(s) SubCutaneous before breakfast  insulin lispro Injectable (ADMELOG) 17 Unit(s) SubCutaneous before dinner  insulin lispro Injectable (ADMELOG) 17 Unit(s) SubCutaneous before lunch  insulin regular Infusion 3 Unit(s)/Hr IV Continuous <Continuous>  lidocaine   4% Patch 3 Patch Transdermal daily  magnesium oxide 400 milliGRAM(s) Oral three times a day with meals  melatonin 5 milliGRAM(s) Oral at bedtime  mycophenolate mofetil 1000 milliGRAM(s) Oral every 12 hours  NIFEdipine XL 60 milliGRAM(s) Oral every 12 hours  OLANZapine 5 milliGRAM(s) Oral at bedtime  pantoprazole    Tablet 40 milliGRAM(s) Oral every 24 hours  polyethylene glycol 3350 17 Gram(s) Oral daily  predniSONE   Tablet 15 milliGRAM(s) Oral every 12 hours  senna 2 Tablet(s) Oral at bedtime  tacrolimus 5.5 milliGRAM(s) Oral <User Schedule>  trimethoprim   80 mG/sulfamethoxazole 400 mG 1 Tablet(s) Oral daily  valGANciclovir 450 milliGRAM(s) Oral every 12 hours                              Physical Therapy Rec:   Home  [x  ]   Home w/ PT  [  ]  Rehab  [  ]    Discussed with Cardiothoracic Team at AM rounds.

## 2024-01-05 NOTE — PROGRESS NOTE ADULT - SUBJECTIVE AND OBJECTIVE BOX
LD VALENCIA  58y Male  MRN:[id number]    Patient is a 58y old  Male who presents with a chief complaint of NSTEMI (01 Nov 2023 20:29)    HPI:  57yo M w/ hx HTN, CAD w/ 1 stent in 2009, ICH (2008) presenting with abn ekg. Patient presented to [facility] where he was found to have STEMI, recommended to get cath however patient did not want to get it there so it left and came here.  Patient initially had cough, congestion, fever, was placed on antibiotics on Sunday.  Started feeling nauseous and had a presyncopal event after which he presented to ED last night.  Had chest pain as well.  Chest pain is midsternal.  Not currently having chest pain.  Received 4 aspirin 30 min pta. (01 Nov 2023 15:11)      Patient seen and evaluated at bedside. interval events noted    Interval HPI:   no acute events o/n     PAST MEDICAL & SURGICAL HISTORY:  HTN (hypertension)      CAD (coronary artery disease)  2009; stent      Intracranial hemorrhage  2008      Respiratory arrest  december 1st      Myocardial infarction, unspecified MI type, unspecified artery      History of coronary artery stent placement          REVIEW OF SYSTEMS:  as per hpi     VITALS:   Vital Signs Last 24 Hrs  T(C): 36.6 (05 Jan 2024 11:38), Max: 36.6 (04 Jan 2024 19:43)  T(F): 97.9 (05 Jan 2024 11:38), Max: 97.9 (04 Jan 2024 19:43)  HR: 73 (05 Jan 2024 11:38) (73 - 103)  BP: 97/63 (05 Jan 2024 11:38) (97/63 - 129/80)  BP(mean): 80 (04 Jan 2024 13:43) (80 - 80)  RR: 18 (05 Jan 2024 11:38) (18 - 18)  SpO2: 97% (05 Jan 2024 11:38) (97% - 98%)    Parameters below as of 05 Jan 2024 11:38  Patient On (Oxygen Delivery Method): room air           PHYSICAL EXAM:  GENERAL: NAD, comfortable.    HEAD:  Atraumatic, Normocephalic  EYES: EOMI, PERRLA, conjunctiva and sclera clear  NECK:  No JVD.    CHEST/LUNG: Clear to auscultation bilaterally; No wheeze    HEART: Regular rate and rhythm; No murmurs, rubs, or gallops  ABDOMEN: Soft, Nontender, Nondistended; Bowel sounds present  EXTREMITIES:  2+ Peripheral Pulses, No clubbing, cyanosis, or edema  NEUROLOGY: a0x3          Consultant(s) Notes Reviewed:  [x ] YES  [ ] NO  Care Discussed with Consultants/Other Providers [ x] YES  [ ] NO    MEDS:   MEDICATIONS  (STANDING):  acetaminophen   IVPB .. 1000 milliGRAM(s) IV Intermittent once  budesonide  80 MICROgram(s)/formoterol 4.5 MICROgram(s) Inhaler 1 Puff(s) Inhalation two times a day  buMETAnide 2 milliGRAM(s) Oral every 12 hours  chlorhexidine 4% Liquid 1 Application(s) Topical <User Schedule>  dextrose 5%. 1000 milliLiter(s) (100 mL/Hr) IV Continuous <Continuous>  dextrose 5%. 1000 milliLiter(s) (50 mL/Hr) IV Continuous <Continuous>  dextrose 50% Injectable 50 milliLiter(s) IV Push every 15 minutes  fluconAZOLE   Tablet 100 milliGRAM(s) Oral <User Schedule>  glucagon  Injectable 1 milliGRAM(s) IntraMuscular once  heparin   Injectable 5000 Unit(s) SubCutaneous every 8 hours  hydrALAZINE 100 milliGRAM(s) Oral every 8 hours  insulin glargine Injectable (LANTUS) 33 Unit(s) SubCutaneous at bedtime  insulin lispro (ADMELOG) corrective regimen sliding scale   SubCutaneous three times a day before meals  insulin lispro (ADMELOG) corrective regimen sliding scale   SubCutaneous <User Schedule>  insulin lispro Injectable (ADMELOG) 20 Unit(s) SubCutaneous before breakfast  insulin lispro Injectable (ADMELOG) 17 Unit(s) SubCutaneous before dinner  insulin lispro Injectable (ADMELOG) 17 Unit(s) SubCutaneous before lunch  insulin regular Infusion 3 Unit(s)/Hr (3 mL/Hr) IV Continuous <Continuous>  lidocaine   4% Patch 3 Patch Transdermal daily  magnesium oxide 400 milliGRAM(s) Oral three times a day with meals  melatonin 5 milliGRAM(s) Oral at bedtime  mycophenolate mofetil 1000 milliGRAM(s) Oral every 12 hours  NIFEdipine XL 60 milliGRAM(s) Oral every 12 hours  OLANZapine 5 milliGRAM(s) Oral at bedtime  pantoprazole    Tablet 40 milliGRAM(s) Oral every 24 hours  polyethylene glycol 3350 17 Gram(s) Oral daily  predniSONE   Tablet 15 milliGRAM(s) Oral every 12 hours  senna 2 Tablet(s) Oral at bedtime  sodium chloride 2% . 150 milliLiter(s) (300 mL/Hr) IV Continuous <Continuous>  tacrolimus 5.5 milliGRAM(s) Oral <User Schedule>  trimethoprim   80 mG/sulfamethoxazole 400 mG 1 Tablet(s) Oral daily  valGANciclovir 450 milliGRAM(s) Oral every 12 hours    MEDICATIONS  (PRN):  acetaminophen     Tablet .. 650 milliGRAM(s) Oral every 6 hours PRN Mild pain  dextrose Oral Gel 15 Gram(s) Oral once PRN Blood Glucose LESS THAN 70 milliGRAM(s)/deciliter      Allergies    penicillins (Unknown)    Intolerances        LABS:                                                                           11.6   16.03 )-----------( 212      ( 05 Jan 2024 07:06 )             34.1   01-05    133<L>  |  97  |  58<H>  ----------------------------<  121<H>  4.6   |  24  |  1.23    Ca    9.9      05 Jan 2024 07:05  Phos  3.5     01-04  Mg     2.0     01-04    TPro  6.4  /  Alb  4.0  /  TBili  0.3  /  DBili  x   /  AST  23  /  ALT  55<H>  /  AlkPhos  54  01-04    < from: CT Chest No Cont (01.05.24 @ 09:25) >  IMPRESSION:  Question mild stercoral colitis.  Expected postoperative changes in the chest. No fluid collection.        --- End of Report ---    < end of copied text >      < from: CT Abdomen and Pelvis No Cont (11.28.23 @ 03:38) >  IMPRESSION:  Mildly dilated colon and prominent but not overly dilated small bowel   with air-fluid levels. No discrete transition point. Findings are   suggestive of ileus.    Intra-aortic balloon pump with the inferior marker at the level of the   inferior mesenteric artery and the balloon overlying the origins of the   renal arteries, celiac artery, and superior mesenteric artery. Consider   repositioning. Concern for IABP positioning was discussed with LANETTE Hawthorne   on 11/28/2023 at 3:42 AM by Dr. Shepard.    < end of copied text >          < from: Colonoscopy (11.17.23 @ 10:35) >                                                                                                        Impression:          - The entire examined colon is normal on direct and retroflexion views.                       - No specimens collected.  Recommendation:      - Resume previous diet today.                       - No large polyps or masses detected, No objection from GI standpoint to                 proceed with heart transplantation/advanced heart therapies.                       - Please call back should any further questions or concerns arise.    < end of copied text >     < from: Xray Chest 1 View- PORTABLE-Urgent (11.01.23 @ 07:42) >    IMPRESSION:  Clear lungs.    ---End of Report ---        < end of copied text >  < from: TTE W or WO Ultrasound Enhancing Agent (11.01.23 @ 10:23) >  _____________________________     CONCLUSIONS:      1. Left ventricular cavity is moderately dilated. Left ventricular wall thickness is normal. Left ventricular systolic function is severely decreased with an ejection fraction of 32 % by Chinchilla's method of disks. Regional wall motion abnormalities present.   2. Multiple segmental abnormalities exist. See findings.   3. There is moderate (grade 2) left ventricular diastolic dysfunction, with indeterminant filling pressure.   4. Normal right ventricular cavity size, wall thickness, and systolic function.   5. No significant valvular disease.   6. No pericardial effusion seen.   7. Compared to the transthoracic echocardiogram performed on 1/25/2017 the areas of akinesis are unchanged but there has been a decline in LV systolic function with new areas of hypokinesis.    __________________________________________________________________    < end of copied text >  < from: TTE Limited W or WO Ultrasound Enhancing Agent (11.02.23 @ 07:41) >  __________________________     CONCLUSIONS:      1. After obtaining consent, Definity ultrasound enhancing agent was given for enhanced left ventricular opacification and improved delineation of the left ventricular endocardial borders. Left ventricular systolic function is severely decreased with a calculated ejection fraction of 22 % by the Chinchilla's biplane method of disks. There is a left ventricular thrombus.   2. Findings were discussed with Litzy BOSS on 11/2/2023 at 8.49am.   3. There is a left ventricular thrombus.    _________________________________________________________________    < end of copied text >   LD VALENCIA  58y Male  MRN:[id number]    Patient is a 58y old  Male who presents with a chief complaint of NSTEMI (01 Nov 2023 20:29)    HPI:  59yo M w/ hx HTN, CAD w/ 1 stent in 2009, ICH (2008) presenting with abn ekg. Patient presented to [facility] where he was found to have STEMI, recommended to get cath however patient did not want to get it there so it left and came here.  Patient initially had cough, congestion, fever, was placed on antibiotics on Sunday.  Started feeling nauseous and had a presyncopal event after which he presented to ED last night.  Had chest pain as well.  Chest pain is midsternal.  Not currently having chest pain.  Received 4 aspirin 30 min pta. (01 Nov 2023 15:11)      Patient seen and evaluated at bedside. interval events noted    Interval HPI:   no acute events o/n     PAST MEDICAL & SURGICAL HISTORY:  HTN (hypertension)      CAD (coronary artery disease)  2009; stent      Intracranial hemorrhage  2008      Respiratory arrest  december 1st      Myocardial infarction, unspecified MI type, unspecified artery      History of coronary artery stent placement          REVIEW OF SYSTEMS:  as per hpi     VITALS:   Vital Signs Last 24 Hrs  T(C): 36.6 (05 Jan 2024 11:38), Max: 36.6 (04 Jan 2024 19:43)  T(F): 97.9 (05 Jan 2024 11:38), Max: 97.9 (04 Jan 2024 19:43)  HR: 73 (05 Jan 2024 11:38) (73 - 103)  BP: 97/63 (05 Jan 2024 11:38) (97/63 - 129/80)  BP(mean): 80 (04 Jan 2024 13:43) (80 - 80)  RR: 18 (05 Jan 2024 11:38) (18 - 18)  SpO2: 97% (05 Jan 2024 11:38) (97% - 98%)    Parameters below as of 05 Jan 2024 11:38  Patient On (Oxygen Delivery Method): room air           PHYSICAL EXAM:  GENERAL: NAD, comfortable.    HEAD:  Atraumatic, Normocephalic  EYES: EOMI, PERRLA, conjunctiva and sclera clear  NECK:  No JVD.    CHEST/LUNG: Clear to auscultation bilaterally; No wheeze    HEART: Regular rate and rhythm; No murmurs, rubs, or gallops  ABDOMEN: Soft, Nontender, Nondistended; Bowel sounds present  EXTREMITIES:  2+ Peripheral Pulses, No clubbing, cyanosis, or edema  NEUROLOGY: a0x3          Consultant(s) Notes Reviewed:  [x ] YES  [ ] NO  Care Discussed with Consultants/Other Providers [ x] YES  [ ] NO    MEDS:   MEDICATIONS  (STANDING):  acetaminophen   IVPB .. 1000 milliGRAM(s) IV Intermittent once  budesonide  80 MICROgram(s)/formoterol 4.5 MICROgram(s) Inhaler 1 Puff(s) Inhalation two times a day  buMETAnide 2 milliGRAM(s) Oral every 12 hours  chlorhexidine 4% Liquid 1 Application(s) Topical <User Schedule>  dextrose 5%. 1000 milliLiter(s) (100 mL/Hr) IV Continuous <Continuous>  dextrose 5%. 1000 milliLiter(s) (50 mL/Hr) IV Continuous <Continuous>  dextrose 50% Injectable 50 milliLiter(s) IV Push every 15 minutes  fluconAZOLE   Tablet 100 milliGRAM(s) Oral <User Schedule>  glucagon  Injectable 1 milliGRAM(s) IntraMuscular once  heparin   Injectable 5000 Unit(s) SubCutaneous every 8 hours  hydrALAZINE 100 milliGRAM(s) Oral every 8 hours  insulin glargine Injectable (LANTUS) 33 Unit(s) SubCutaneous at bedtime  insulin lispro (ADMELOG) corrective regimen sliding scale   SubCutaneous three times a day before meals  insulin lispro (ADMELOG) corrective regimen sliding scale   SubCutaneous <User Schedule>  insulin lispro Injectable (ADMELOG) 20 Unit(s) SubCutaneous before breakfast  insulin lispro Injectable (ADMELOG) 17 Unit(s) SubCutaneous before dinner  insulin lispro Injectable (ADMELOG) 17 Unit(s) SubCutaneous before lunch  insulin regular Infusion 3 Unit(s)/Hr (3 mL/Hr) IV Continuous <Continuous>  lidocaine   4% Patch 3 Patch Transdermal daily  magnesium oxide 400 milliGRAM(s) Oral three times a day with meals  melatonin 5 milliGRAM(s) Oral at bedtime  mycophenolate mofetil 1000 milliGRAM(s) Oral every 12 hours  NIFEdipine XL 60 milliGRAM(s) Oral every 12 hours  OLANZapine 5 milliGRAM(s) Oral at bedtime  pantoprazole    Tablet 40 milliGRAM(s) Oral every 24 hours  polyethylene glycol 3350 17 Gram(s) Oral daily  predniSONE   Tablet 15 milliGRAM(s) Oral every 12 hours  senna 2 Tablet(s) Oral at bedtime  sodium chloride 2% . 150 milliLiter(s) (300 mL/Hr) IV Continuous <Continuous>  tacrolimus 5.5 milliGRAM(s) Oral <User Schedule>  trimethoprim   80 mG/sulfamethoxazole 400 mG 1 Tablet(s) Oral daily  valGANciclovir 450 milliGRAM(s) Oral every 12 hours    MEDICATIONS  (PRN):  acetaminophen     Tablet .. 650 milliGRAM(s) Oral every 6 hours PRN Mild pain  dextrose Oral Gel 15 Gram(s) Oral once PRN Blood Glucose LESS THAN 70 milliGRAM(s)/deciliter      Allergies    penicillins (Unknown)    Intolerances        LABS:                                                                           11.6   16.03 )-----------( 212      ( 05 Jan 2024 07:06 )             34.1   01-05    133<L>  |  97  |  58<H>  ----------------------------<  121<H>  4.6   |  24  |  1.23    Ca    9.9      05 Jan 2024 07:05  Phos  3.5     01-04  Mg     2.0     01-04    TPro  6.4  /  Alb  4.0  /  TBili  0.3  /  DBili  x   /  AST  23  /  ALT  55<H>  /  AlkPhos  54  01-04    < from: CT Chest No Cont (01.05.24 @ 09:25) >  IMPRESSION:  Question mild stercoral colitis.  Expected postoperative changes in the chest. No fluid collection.        --- End of Report ---    < end of copied text >      < from: CT Abdomen and Pelvis No Cont (11.28.23 @ 03:38) >  IMPRESSION:  Mildly dilated colon and prominent but not overly dilated small bowel   with air-fluid levels. No discrete transition point. Findings are   suggestive of ileus.    Intra-aortic balloon pump with the inferior marker at the level of the   inferior mesenteric artery and the balloon overlying the origins of the   renal arteries, celiac artery, and superior mesenteric artery. Consider   repositioning. Concern for IABP positioning was discussed with LANETTE Hawthorne   on 11/28/2023 at 3:42 AM by Dr. Shepard.    < end of copied text >          < from: Colonoscopy (11.17.23 @ 10:35) >                                                                                                        Impression:          - The entire examined colon is normal on direct and retroflexion views.                       - No specimens collected.  Recommendation:      - Resume previous diet today.                       - No large polyps or masses detected, No objection from GI standpoint to                 proceed with heart transplantation/advanced heart therapies.                       - Please call back should any further questions or concerns arise.    < end of copied text >     < from: Xray Chest 1 View- PORTABLE-Urgent (11.01.23 @ 07:42) >    IMPRESSION:  Clear lungs.    ---End of Report ---        < end of copied text >  < from: TTE W or WO Ultrasound Enhancing Agent (11.01.23 @ 10:23) >  _____________________________     CONCLUSIONS:      1. Left ventricular cavity is moderately dilated. Left ventricular wall thickness is normal. Left ventricular systolic function is severely decreased with an ejection fraction of 32 % by Chinchilla's method of disks. Regional wall motion abnormalities present.   2. Multiple segmental abnormalities exist. See findings.   3. There is moderate (grade 2) left ventricular diastolic dysfunction, with indeterminant filling pressure.   4. Normal right ventricular cavity size, wall thickness, and systolic function.   5. No significant valvular disease.   6. No pericardial effusion seen.   7. Compared to the transthoracic echocardiogram performed on 1/25/2017 the areas of akinesis are unchanged but there has been a decline in LV systolic function with new areas of hypokinesis.    __________________________________________________________________    < end of copied text >  < from: TTE Limited W or WO Ultrasound Enhancing Agent (11.02.23 @ 07:41) >  __________________________     CONCLUSIONS:      1. After obtaining consent, Definity ultrasound enhancing agent was given for enhanced left ventricular opacification and improved delineation of the left ventricular endocardial borders. Left ventricular systolic function is severely decreased with a calculated ejection fraction of 22 % by the Chinchilla's biplane method of disks. There is a left ventricular thrombus.   2. Findings were discussed with Litzy BOSS on 11/2/2023 at 8.49am.   3. There is a left ventricular thrombus.    _________________________________________________________________    < end of copied text >

## 2024-01-05 NOTE — PROGRESS NOTE ADULT - ASSESSMENT
59 yo M with HFrEF (LVIDd 6.4 cm, LVEF 32%), CAD s/p PCI (2008), HTN, DMT2 (A1c 8.3%) and CVA s/p TPA (2018), recently treated for PNA who initially presented to [facility] via EMS after syncope reportedly requiring defibrillation. Treated for ACS but left AMA to come to [facility]. On arrival ECG with ST depression in lateral leads. Intubated 11/1 for respiratory failure and was found to have COVID. L/RHC 11/1revealing  of LAD and RCA, elevated filling pressures and CI of 1.5 prompting placement of IABP. Extubated 11/3. IABP was weaned to off 11/7, however the following day developed PMVT cardiac arrest with prompt CPR and defibrillation, started on IV Lidocaine/Amio and IABP ultimately replaced on 11/9. During this admission was found to be bacteremic for which she was treated for Staph epi, Enterococcus faecalis, Cutibacterium with IV abx.  Was listed Status 2, ABO A, PRA 0% (12/12).     A suitable donor was identified and on 12/25, he underwent OHT (ischemic Time: 253min, CMV -/+, Toxo -/-). The following day, extubated and IABP removed. Overall progressing well. He underwent RHC/EMBx with revealed elevated filling pressures and normal cardiac output. His diuretics have been increased and he is responding well. He continues to have elevated WBC however remains afebrile and off antibiotics. Dispo pending for tomorrow vs Monday.    59 yo M with HFrEF (LVIDd 6.4 cm, LVEF 32%), CAD s/p PCI (2008), HTN, DMT2 (A1c 8.3%) and CVA s/p TPA (2018), recently treated for PNA who initially presented to [facility] via EMS after syncope reportedly requiring defibrillation. Treated for ACS but left AMA to come to [facility]. On arrival ECG with ST depression in lateral leads. Intubated 11/1 for respiratory failure and was found to have COVID. L/RHC 11/1revealing  of LAD and RCA, elevated filling pressures and CI of 1.5 prompting placement of IABP. Extubated 11/3. IABP was weaned to off 11/7, however the following day developed PMVT cardiac arrest with prompt CPR and defibrillation, started on IV Lidocaine/Amio and IABP ultimately replaced on 11/9. During this admission was found to be bacteremic for which she was treated for Staph epi, Enterococcus faecalis, Cutibacterium with IV abx.  Was listed Status 2, ABO A, PRA 0% (12/12).     A suitable donor was identified and on 12/25, he underwent OHT (ischemic Time: 253min, CMV -/+, Toxo -/-). The following day, extubated and IABP removed. Overall progressing well. He underwent RHC/EMBx with revealed elevated filling pressures and normal cardiac output. His diuretics have been increased and he is responding well. He continues to have elevated WBC however remains afebrile and off antibiotics. Dispo pending for tomorrow vs Monday.    57 yo M with HFrEF (LVIDd 6.4 cm, LVEF 32%), CAD s/p PCI (2008), HTN, DMT2 (A1c 8.3%) and CVA s/p TPA (2018), recently treated for PNA who initially presented to [facility] via EMS after syncope reportedly requiring defibrillation. Treated for ACS but left AMA to come to [facility]. On arrival ECG with ST depression in lateral leads. Intubated 11/1 for respiratory failure and was found to have COVID. L/RHC 11/1revealing  of LAD and RCA, elevated filling pressures and CI of 1.5 prompting placement of IABP. Extubated 11/3. IABP was weaned to off 11/7, however the following day developed PMVT cardiac arrest with prompt CPR and defibrillation, started on IV Lidocaine/Amio and IABP ultimately replaced on 11/9. During this admission was found to be bacteremic for which she was treated for Staph epi, Enterococcus faecalis, Cutibacterium with IV abx.  Was listed Status 2, ABO A, PRA 0% (12/12).     A suitable donor was identified and on 12/25, he underwent OHT (ischemic Time: 253min, CMV -/+, Toxo -/-). The following day, extubated and IABP removed. Overall progressing well. He underwent RHC/EMBx with revealed elevated filling pressures and normal cardiac output. His diuretics have been increased and he is responding well. He continues to have elevated WBC however remains afebrile and off antibiotics. Currently undergoing infectious work up. Next RHC/EMBx schedule Tuesday 1/9 57 yo M with HFrEF (LVIDd 6.4 cm, LVEF 32%), CAD s/p PCI (2008), HTN, DMT2 (A1c 8.3%) and CVA s/p TPA (2018), recently treated for PNA who initially presented to [facility] via EMS after syncope reportedly requiring defibrillation. Treated for ACS but left AMA to come to [facility]. On arrival ECG with ST depression in lateral leads. Intubated 11/1 for respiratory failure and was found to have COVID. L/RHC 11/1revealing  of LAD and RCA, elevated filling pressures and CI of 1.5 prompting placement of IABP. Extubated 11/3. IABP was weaned to off 11/7, however the following day developed PMVT cardiac arrest with prompt CPR and defibrillation, started on IV Lidocaine/Amio and IABP ultimately replaced on 11/9. During this admission was found to be bacteremic for which she was treated for Staph epi, Enterococcus faecalis, Cutibacterium with IV abx.  Was listed Status 2, ABO A, PRA 0% (12/12).     A suitable donor was identified and on 12/25, he underwent OHT (ischemic Time: 253min, CMV -/+, Toxo -/-). The following day, extubated and IABP removed. Overall progressing well. He underwent RHC/EMBx with revealed elevated filling pressures and normal cardiac output. His diuretics have been increased and he is responding well. He continues to have elevated WBC however remains afebrile and off antibiotics. Currently undergoing infectious work up. Next RHC/EMBx schedule Tuesday 1/9

## 2024-01-05 NOTE — PROGRESS NOTE ADULT - PROBLEM SELECTOR PLAN 3
·  Plan: - pretransplant was noted to have positive BCx staph epi, Enterococcus faecalis and Cutibacterium   - post transplant has been afebrile.  - BC 1/3 NGTD

## 2024-01-05 NOTE — PROGRESS NOTE ADULT - NS ATTEND AMEND GEN_ALL_CORE FT
57 y/o M admitted with cardiogenic shock, now s/p OHT on 12/25/2023 (CMV -/+, Toxo -/-). RHC/EMB 1/2/2024 with elevated filling pressures. Has received intermittent hypertonic saline this week. Would give another dose today. Increase Bumex 2mg PO BID.   Continue Hydralazine and Nifedipine for BP control.  Continue immunosuppression with Tacrolimus, CellCept 1000mg BID, Prednisone taper. Continue prophylaxis with Bactrim, Valcyte, Fluconazole.  Plan for pan-CT today to evaluate leukocytosis.

## 2024-01-05 NOTE — PROGRESS NOTE ADULT - ASSESSMENT
58M : HFrEF (LVIDd 6.4 cm, LVEF 32%), PCI ('08), HTN, DMT2 (A1c 8.3%) and CVA s/p TPA ('18), recently treated for PNA who initially presented to [facility] via EMS after syncope reportedly requiring defibrillation. Treated for ACS but left AMA to come to [facility]. On arrival ECG with ST depression in lateral leads. Intubated 11/1 for respiratory failure and was found to have COVID. L/RHC 11/1revealing  of LAD and RCA, elevated filling pressures and CI of 1.5 prompting placement of IABP. Extubated 11/3. IABP was weaned to off 11/7, however the following day developed PMVT cardiac arrest with prompt CPR and defibrillation, started on IV Lidocaine/Amio and IABP ultimately replaced on 11/9. During this admission was found to be bacteremic for which she was treated for Staph epi, Enterococcus faecalis, Cutibacterium with IV abx.  Was listed Status 2, ABO A, PRA 0% (12/12).     A suitable donor was identified and on 12/25, he underwent OHT (ischemic Time: 253min, CMV -/+, Toxo -/-). The following day, extubated and IABP removed. Milrinone was being gradually weaned but when turned off yesterday, despite adequate perfusion indicies, had rise in lactate for which inotropic support was resumed. Currently appears well supported and compensated. Will again trial wean off inotrope. Will also adjust oral antihypertensive regimen with goal to wean off Cardene gtt. Plan for 1st EMBX next week on 1/2.     On 10/25/23, pt underwent OHT  - IABP removed 12/26  - Sabrina and Epi weaned off 12/26  - Milrinone off  - Procardia XL 60 mg BID  - Bumex TID  - 1st RHC/EMBx on Tuesday 1/2  - Transfer to Stepdown 12/31  -  Right femerol rangel in place   - Med CT x 3 - LWS      insulin gtt d/t hyperglycemia - house endo following   - d/c planning - aniticipate home   1/1/24 - VSS - pt. c/o uncomfortable bed last evening - stating he would sign out AMA if he was not given a more comfortable bed.  staff obliged & a new bed was brought in.  pt requesting new urinal with each void as he "is at risk for an infection"  Ptt properly educated on post transplant risks.  states, "A doctor told me I can only use a urinal once to prevent infection"  transplant team to reinforce post-transplant precautions. MED CT x 3 in place  ?d/c femerol rangel today - will rounds with transplant team  1/2/24 VSS - NPO for cardiac bx -rhc this am-  pt refused blood draw this am despite provider discussing the importance of blood levels post transplant.  Pt acquiesced - allowed blood draw @ 7:30a-d/c planning -   1/3 s/p biopsy and rhc yesterday Ms ct x 1 remains in place.  Glucose improved, currentyly off insulin infusion  1/4/23   F/u cultures, rvp neg Glucose stable overnight MS ct remains in place  1/5  VSS  afebrile Na+ 133.  Wbc 16.  Ct c/a/p results pnd.  RVP neg.  BC-ngtd.  -200cc/24.  BS 99.  Tacro 8.2 58M : HFrEF (LVIDd 6.4 cm, LVEF 32%), PCI ('08), HTN, DMT2 (A1c 8.3%) and CVA s/p TPA ('18), recently treated for PNA who initially presented to [facility] via EMS after syncope reportedly requiring defibrillation. Treated for ACS but left AMA to come to [facility]. On arrival ECG with ST depression in lateral leads. Intubated 11/1 for respiratory failure and was found to have COVID. L/RHC 11/1revealing  of LAD and RCA, elevated filling pressures and CI of 1.5 prompting placement of IABP. Extubated 11/3. IABP was weaned to off 11/7, however the following day developed PMVT cardiac arrest with prompt CPR and defibrillation, started on IV Lidocaine/Amio and IABP ultimately replaced on 11/9. During this admission was found to be bacteremic for which she was treated for Staph epi, Enterococcus faecalis, Cutibacterium with IV abx.  Was listed Status 2, ABO A, PRA 0% (12/12).     A suitable donor was identified and on 12/25, he underwent OHT (ischemic Time: 253min, CMV -/+, Toxo -/-). The following day, extubated and IABP removed. Milrinone was being gradually weaned but when turned off yesterday, despite adequate perfusion indicies, had rise in lactate for which inotropic support was resumed. Currently appears well supported and compensated. Will again trial wean off inotrope. Will also adjust oral antihypertensive regimen with goal to wean off Cardene gtt. Plan for 1st EMBX next week on 1/2.     On 10/25/23, pt underwent OHT  - IABP removed 12/26  - Sabrina and Epi weaned off 12/26  - Milrinone off  - Procardia XL 60 mg BID  - Bumex TID  - 1st RHC/EMBx on Tuesday 1/2  - Transfer to Stepdown 12/31  -  Right femerol rangel in place   - Med CT x 3 - LWS      insulin gtt d/t hyperglycemia - house endo following   - d/c planning - aniticipate home   1/1/24 - VSS - pt. c/o uncomfortable bed last evening - stating he would sign out AMA if he was not given a more comfortable bed.  staff obliged & a new bed was brought in.  pt requesting new urinal with each void as he "is at risk for an infection"  Ptt properly educated on post transplant risks.  states, "A doctor told me I can only use a urinal once to prevent infection"  transplant team to reinforce post-transplant precautions. MED CT x 3 in place  ?d/c femerol rangel today - will rounds with transplant team  1/2/24 VSS - NPO for cardiac bx -rhc this am-  pt refused blood draw this am despite provider discussing the importance of blood levels post transplant.  Pt acquiesced - allowed blood draw @ 7:30a-d/c planning -   1/3 s/p biopsy and rhc yesterday Ms ct x 1 remains in place.  Glucose improved, currentyly off insulin infusion  1/4/23   F/u cultures, rvp neg Glucose stable overnight MS ct remains in place  1/5  VSS  afebrile Na+ 133.  Wbc 16.  Ct c/a/p results pnd.  RVP neg.  BC-ngtd.  -200cc/24.  BS 99.  Tacro 8.2

## 2024-01-05 NOTE — PROGRESS NOTE ADULT - PROBLEM SELECTOR PLAN 2
- s/p Simulect on POD 12/25 and received second dose on POD 4 (12/29).  - Continue with prednisone taper based on protocol   - Continue with Cellcept 1g PO BID  - Will continue to adjust tacro as needed for goal will be 12-14. Now on fluconazole in order to help boost tacro levels

## 2024-01-05 NOTE — PROGRESS NOTE ADULT - PROBLEM SELECTOR PLAN 1
- s/p OHT on 12/25. Ischemic Time: 253min  - IABP removed 12/26  - continue with Procardia XL 60 mg BID and hydralazine 100 mg PO TID  - make Bumex Bumex 1 mg PO BID. S/p hypertonic saline on 1/2, plan to receive additional dose x 2 today   - underwent RHC/EMBx on 1/2, Grade 0R  - Please keep primary Dr. Banuelos [phone] in the loop per family request. - s/p OHT on 12/25. Ischemic Time: 253min  - IABP removed 12/26  - continue with Procardia XL 60 mg BID and hydralazine 100 mg PO TID  - make Bumex Bumex 1 mg PO BID. S/p hypertonic saline on 1/2, plan to receive additional dose x 2 today   - underwent RHC/EMBx on 1/2, Grade 0R  - Please keep primary Dr. Banuelos [phone] in the loop per family request. - s/p OHT on 12/25. Ischemic Time: 253min  - IABP removed 12/26  - continue with Procardia XL 60 mg BID and hydralazine 100 mg PO TID  - increase Bumex Bumex 2 mg PO BID. Plan to give additional dose of hypertonic saline today   - underwent RHC/EMBx on 1/2, Grade 0R  - next RHC/EMBx schedule for Tuesday 1/9  - Please keep primary Dr. Banuelos [phone] in the loop per family request. - s/p OHT on 12/25. Ischemic Time: 253min  - IABP removed 12/26  - continue with Procardia XL 60 mg BID and hydralazine 100 mg PO TID  - increase Bumex Bumex 2 mg PO BID. Plan to give additional dose of hypertonic saline today   - underwent RHC/EMBx on 1/2, Grade 0R  - next RHC/EMBx schedule for Tuesday 1/9  - Please keep primary Dr. Banuelos [phone] in the loop per family request.

## 2024-01-05 NOTE — PROGRESS NOTE ADULT - SUBJECTIVE AND OBJECTIVE BOX
INFECTIOUS DISEASES FOLLOW UP-- Courtney Peters  [phone]    This is a follow up note for this  58yMale with  Non-ST elevation myocardial infarction (NSTEMI)        ROS:  CONSTITUTIONAL:  No fever, good appetite  CARDIOVASCULAR:  No chest pain or palpitations  RESPIRATORY:  No dyspnea  GASTROINTESTINAL:  No nausea, vomiting, diarrhea, or abdominal pain  GENITOURINARY:  No dysuria  NEUROLOGIC:  No headache,     Allergies    penicillins (Unknown)    Intolerances        ANTIBIOTICS/RELEVANT:  antimicrobials  fluconAZOLE   Tablet 100 milliGRAM(s) Oral <User Schedule>  trimethoprim   80 mG/sulfamethoxazole 400 mG 1 Tablet(s) Oral daily  valGANciclovir 450 milliGRAM(s) Oral every 12 hours    immunologic:  mycophenolate mofetil 1000 milliGRAM(s) Oral every 12 hours  tacrolimus 5.5 milliGRAM(s) Oral <User Schedule>    OTHER:  acetaminophen     Tablet .. 650 milliGRAM(s) Oral every 6 hours PRN  acetaminophen   IVPB .. 1000 milliGRAM(s) IV Intermittent once  budesonide  80 MICROgram(s)/formoterol 4.5 MICROgram(s) Inhaler 1 Puff(s) Inhalation two times a day  buMETAnide 2 milliGRAM(s) Oral every 12 hours  chlorhexidine 4% Liquid 1 Application(s) Topical <User Schedule>  dextrose 5%. 1000 milliLiter(s) IV Continuous <Continuous>  dextrose 5%. 1000 milliLiter(s) IV Continuous <Continuous>  dextrose 50% Injectable 50 milliLiter(s) IV Push every 15 minutes  dextrose Oral Gel 15 Gram(s) Oral once PRN  glucagon  Injectable 1 milliGRAM(s) IntraMuscular once  heparin   Injectable 5000 Unit(s) SubCutaneous every 8 hours  hydrALAZINE 100 milliGRAM(s) Oral every 8 hours  insulin glargine Injectable (LANTUS) 33 Unit(s) SubCutaneous at bedtime  insulin lispro (ADMELOG) corrective regimen sliding scale   SubCutaneous <User Schedule>  insulin lispro (ADMELOG) corrective regimen sliding scale   SubCutaneous three times a day before meals  insulin lispro Injectable (ADMELOG) 20 Unit(s) SubCutaneous before breakfast  insulin lispro Injectable (ADMELOG) 17 Unit(s) SubCutaneous before dinner  insulin lispro Injectable (ADMELOG) 17 Unit(s) SubCutaneous before lunch  insulin regular Infusion 3 Unit(s)/Hr IV Continuous <Continuous>  lidocaine   4% Patch 3 Patch Transdermal daily  magnesium citrate Oral Solution 150 milliLiter(s) Oral once  magnesium oxide 400 milliGRAM(s) Oral three times a day with meals  melatonin 5 milliGRAM(s) Oral at bedtime  NIFEdipine XL 60 milliGRAM(s) Oral every 12 hours  OLANZapine 5 milliGRAM(s) Oral at bedtime  pantoprazole    Tablet 40 milliGRAM(s) Oral every 24 hours  polyethylene glycol 3350 17 Gram(s) Oral daily  predniSONE   Tablet 15 milliGRAM(s) Oral every 12 hours  senna 2 Tablet(s) Oral at bedtime      Objective:  Vital Signs Last 24 Hrs  T(C): 36.6 (05 Jan 2024 11:38), Max: 36.6 (04 Jan 2024 19:43)  T(F): 97.9 (05 Jan 2024 11:38), Max: 97.9 (04 Jan 2024 19:43)  HR: 73 (05 Jan 2024 11:38) (73 - 99)  BP: 97/63 (05 Jan 2024 11:38) (97/63 - 129/80)  BP(mean): --  RR: 18 (05 Jan 2024 11:38) (18 - 18)  SpO2: 97% (05 Jan 2024 11:38) (97% - 97%)    Parameters below as of 05 Jan 2024 11:38  Patient On (Oxygen Delivery Method): room air        PHYSICAL EXAM:  Constitutional:no acute distress  Eyes:MARVEL, EOMI  Ear/Nose/Throat: no oral lesions, 	  Respiratory: clear BL  Cardiovascular: S1S2  Gastrointestinal:soft, (+) BS, no tenderness  Extremities:no e/e/c  No Lymphadenopathy  IV sites not inflammed.    LABS:                        11.6   16.03 )-----------( 212      ( 05 Jan 2024 07:06 )             34.1     01-05    133<L>  |  97  |  58<H>  ----------------------------<  121<H>  4.6   |  24  |  1.23    Ca    9.9      05 Jan 2024 07:05  Phos  3.5     01-04  Mg     2.0     01-04    TPro  6.4  /  Alb  4.0  /  TBili  0.3  /  DBili  x   /  AST  23  /  ALT  55<H>  /  AlkPhos  54  01-04      Urinalysis Basic - ( 05 Jan 2024 07:05 )    Color: x / Appearance: x / SG: x / pH: x  Gluc: 121 mg/dL / Ketone: x  / Bili: x / Urobili: x   Blood: x / Protein: x / Nitrite: x   Leuk Esterase: x / RBC: x / WBC x   Sq Epi: x / Non Sq Epi: x / Bacteria: x        MICROBIOLOGY:            RECENT CULTURES:  01-03 @ 12:15  .Blood Blood-Venous  --  --  --    No growth at 24 hours  --  01-02 @ 20:36  Clean Catch Clean Catch (Midstream)  --  --  --    >=3 organisms. Probable collection contamination.  --      RADIOLOGY & ADDITIONAL STUDIES:    < from: CT Chest No Cont (01.05.24 @ 09:25) >  IMPRESSION:  Question mild stercoral colitis.  Expected postoperative changes in the chest. No fluid collection.    < end of copied text >   INFECTIOUS DISEASES FOLLOW UP-- Courtney Peters  [phone]    This is a follow up note for this  58yMale with  Non-ST elevation myocardial infarction (NSTEMI)        ROS:  CONSTITUTIONAL:  No fever, good appetite  CARDIOVASCULAR:  No chest pain or palpitations  RESPIRATORY:  No dyspnea  GASTROINTESTINAL:  No nausea, vomiting, diarrhea, or abdominal pain  GENITOURINARY:  No dysuria  NEUROLOGIC:  No headache,     Allergies    penicillins (Unknown)    Intolerances        ANTIBIOTICS/RELEVANT:  antimicrobials  fluconAZOLE   Tablet 100 milliGRAM(s) Oral <User Schedule>  trimethoprim   80 mG/sulfamethoxazole 400 mG 1 Tablet(s) Oral daily  valGANciclovir 450 milliGRAM(s) Oral every 12 hours    immunologic:  mycophenolate mofetil 1000 milliGRAM(s) Oral every 12 hours  tacrolimus 5.5 milliGRAM(s) Oral <User Schedule>    OTHER:  acetaminophen     Tablet .. 650 milliGRAM(s) Oral every 6 hours PRN  acetaminophen   IVPB .. 1000 milliGRAM(s) IV Intermittent once  budesonide  80 MICROgram(s)/formoterol 4.5 MICROgram(s) Inhaler 1 Puff(s) Inhalation two times a day  buMETAnide 2 milliGRAM(s) Oral every 12 hours  chlorhexidine 4% Liquid 1 Application(s) Topical <User Schedule>  dextrose 5%. 1000 milliLiter(s) IV Continuous <Continuous>  dextrose 5%. 1000 milliLiter(s) IV Continuous <Continuous>  dextrose 50% Injectable 50 milliLiter(s) IV Push every 15 minutes  dextrose Oral Gel 15 Gram(s) Oral once PRN  glucagon  Injectable 1 milliGRAM(s) IntraMuscular once  heparin   Injectable 5000 Unit(s) SubCutaneous every 8 hours  hydrALAZINE 100 milliGRAM(s) Oral every 8 hours  insulin glargine Injectable (LANTUS) 33 Unit(s) SubCutaneous at bedtime  insulin lispro (ADMELOG) corrective regimen sliding scale   SubCutaneous <User Schedule>  insulin lispro (ADMELOG) corrective regimen sliding scale   SubCutaneous three times a day before meals  insulin lispro Injectable (ADMELOG) 20 Unit(s) SubCutaneous before breakfast  insulin lispro Injectable (ADMELOG) 17 Unit(s) SubCutaneous before dinner  insulin lispro Injectable (ADMELOG) 17 Unit(s) SubCutaneous before lunch  insulin regular Infusion 3 Unit(s)/Hr IV Continuous <Continuous>  lidocaine   4% Patch 3 Patch Transdermal daily  magnesium citrate Oral Solution 150 milliLiter(s) Oral once  magnesium oxide 400 milliGRAM(s) Oral three times a day with meals  melatonin 5 milliGRAM(s) Oral at bedtime  NIFEdipine XL 60 milliGRAM(s) Oral every 12 hours  OLANZapine 5 milliGRAM(s) Oral at bedtime  pantoprazole    Tablet 40 milliGRAM(s) Oral every 24 hours  polyethylene glycol 3350 17 Gram(s) Oral daily  predniSONE   Tablet 15 milliGRAM(s) Oral every 12 hours  senna 2 Tablet(s) Oral at bedtime      Objective:  Vital Signs Last 24 Hrs  T(C): 36.6 (05 Jan 2024 11:38), Max: 36.6 (04 Jan 2024 19:43)  T(F): 97.9 (05 Jan 2024 11:38), Max: 97.9 (04 Jan 2024 19:43)  HR: 73 (05 Jan 2024 11:38) (73 - 99)  BP: 97/63 (05 Jan 2024 11:38) (97/63 - 129/80)  BP(mean): --  RR: 18 (05 Jan 2024 11:38) (18 - 18)  SpO2: 97% (05 Jan 2024 11:38) (97% - 97%)    Parameters below as of 05 Jan 2024 11:38  Patient On (Oxygen Delivery Method): room air        PHYSICAL EXAM:  Constitutional:no acute distress  Eyes:MARVEL, EOMI  Ear/Nose/Throat: no oral lesions, 	  Respiratory: clear BL  Cardiovascular: S1S2  Gastrointestinal:soft, (+) BS, no tenderness  Extremities:no e/e/c  No Lymphadenopathy  IV sites not inflammed.    LABS:                        11.6   16.03 )-----------( 212      ( 05 Jan 2024 07:06 )             34.1     01-05    133<L>  |  97  |  58<H>  ----------------------------<  121<H>  4.6   |  24  |  1.23    Ca    9.9      05 Jan 2024 07:05  Phos  3.5     01-04  Mg     2.0     01-04    TPro  6.4  /  Alb  4.0  /  TBili  0.3  /  DBili  x   /  AST  23  /  ALT  55<H>  /  AlkPhos  54  01-04      Urinalysis Basic - ( 05 Jan 2024 07:05 )    Color: x / Appearance: x / SG: x / pH: x  Gluc: 121 mg/dL / Ketone: x  / Bili: x / Urobili: x   Blood: x / Protein: x / Nitrite: x   Leuk Esterase: x / RBC: x / WBC x   Sq Epi: x / Non Sq Epi: x / Bacteria: x        MICROBIOLOGY:            RECENT CULTURES:  01-03 @ 12:15  .Blood Blood-Venous  --  --  --    No growth at 24 hours  --  01-02 @ 20:36  Clean Catch Clean Catch (Midstream)  --  --  --    >=3 organisms. Probable collection contamination.  --      RADIOLOGY & ADDITIONAL STUDIES:    < from: CT Chest No Cont (01.05.24 @ 09:25) >  IMPRESSION:  Question mild stercoral colitis.  Expected postoperative changes in the chest. No fluid collection.    < end of copied text >   INFECTIOUS DISEASES FOLLOW UP-- Courtney Peters  [phone]    This is a follow up note for this  58yMale with  Non-ST elevation myocardial infarction (NSTEMI)  s/p heart transplant      ROS:  CONSTITUTIONAL:  No fever, good appetite  CARDIOVASCULAR:  No chest pain or palpitations  RESPIRATORY:  No dyspnea  GASTROINTESTINAL:  No nausea, vomiting, diarrhea, or abdominal pain  GENITOURINARY:  No dysuria  NEUROLOGIC:  No headache,     Allergies    penicillins (Unknown)    Intolerances        ANTIBIOTICS/RELEVANT:  antimicrobials  fluconAZOLE   Tablet 100 milliGRAM(s) Oral <User Schedule>  trimethoprim   80 mG/sulfamethoxazole 400 mG 1 Tablet(s) Oral daily  valGANciclovir 450 milliGRAM(s) Oral every 12 hours    immunologic:  mycophenolate mofetil 1000 milliGRAM(s) Oral every 12 hours  tacrolimus 5.5 milliGRAM(s) Oral <User Schedule>    OTHER:  acetaminophen     Tablet .. 650 milliGRAM(s) Oral every 6 hours PRN  acetaminophen   IVPB .. 1000 milliGRAM(s) IV Intermittent once  budesonide  80 MICROgram(s)/formoterol 4.5 MICROgram(s) Inhaler 1 Puff(s) Inhalation two times a day  buMETAnide 2 milliGRAM(s) Oral every 12 hours  chlorhexidine 4% Liquid 1 Application(s) Topical <User Schedule>  dextrose 5%. 1000 milliLiter(s) IV Continuous <Continuous>  dextrose 5%. 1000 milliLiter(s) IV Continuous <Continuous>  dextrose 50% Injectable 50 milliLiter(s) IV Push every 15 minutes  dextrose Oral Gel 15 Gram(s) Oral once PRN  glucagon  Injectable 1 milliGRAM(s) IntraMuscular once  heparin   Injectable 5000 Unit(s) SubCutaneous every 8 hours  hydrALAZINE 100 milliGRAM(s) Oral every 8 hours  insulin glargine Injectable (LANTUS) 33 Unit(s) SubCutaneous at bedtime  insulin lispro (ADMELOG) corrective regimen sliding scale   SubCutaneous <User Schedule>  insulin lispro (ADMELOG) corrective regimen sliding scale   SubCutaneous three times a day before meals  insulin lispro Injectable (ADMELOG) 20 Unit(s) SubCutaneous before breakfast  insulin lispro Injectable (ADMELOG) 17 Unit(s) SubCutaneous before dinner  insulin lispro Injectable (ADMELOG) 17 Unit(s) SubCutaneous before lunch  insulin regular Infusion 3 Unit(s)/Hr IV Continuous <Continuous>  lidocaine   4% Patch 3 Patch Transdermal daily  magnesium citrate Oral Solution 150 milliLiter(s) Oral once  magnesium oxide 400 milliGRAM(s) Oral three times a day with meals  melatonin 5 milliGRAM(s) Oral at bedtime  NIFEdipine XL 60 milliGRAM(s) Oral every 12 hours  OLANZapine 5 milliGRAM(s) Oral at bedtime  pantoprazole    Tablet 40 milliGRAM(s) Oral every 24 hours  polyethylene glycol 3350 17 Gram(s) Oral daily  predniSONE   Tablet 15 milliGRAM(s) Oral every 12 hours  senna 2 Tablet(s) Oral at bedtime      Objective:  Vital Signs Last 24 Hrs  T(C): 36.6 (05 Jan 2024 11:38), Max: 36.6 (04 Jan 2024 19:43)  T(F): 97.9 (05 Jan 2024 11:38), Max: 97.9 (04 Jan 2024 19:43)  HR: 73 (05 Jan 2024 11:38) (73 - 99)  BP: 97/63 (05 Jan 2024 11:38) (97/63 - 129/80)  BP(mean): --  RR: 18 (05 Jan 2024 11:38) (18 - 18)  SpO2: 97% (05 Jan 2024 11:38) (97% - 97%)    Parameters below as of 05 Jan 2024 11:38  Patient On (Oxygen Delivery Method): room air        PHYSICAL EXAM:  Constitutional:no acute distress  Eyes:MARVEL, EOMI  Ear/Nose/Throat: no oral lesions, 	  Respiratory: clear BL  Cardiovascular: S1S2 sternotomy wound CDI  Gastrointestinal:soft, (+) BS, no tenderness  Extremities:no e/e/c  No Lymphadenopathy  IV sites not inflammed.    LABS:                        11.6   16.03 )-----------( 212      ( 05 Jan 2024 07:06 )             34.1     01-05    133<L>  |  97  |  58<H>  ----------------------------<  121<H>  4.6   |  24  |  1.23    Ca    9.9      05 Jan 2024 07:05  Phos  3.5     01-04  Mg     2.0     01-04    TPro  6.4  /  Alb  4.0  /  TBili  0.3  /  DBili  x   /  AST  23  /  ALT  55<H>  /  AlkPhos  54  01-04      Urinalysis Basic - ( 05 Jan 2024 07:05 )    Color: x / Appearance: x / SG: x / pH: x  Gluc: 121 mg/dL / Ketone: x  / Bili: x / Urobili: x   Blood: x / Protein: x / Nitrite: x   Leuk Esterase: x / RBC: x / WBC x   Sq Epi: x / Non Sq Epi: x / Bacteria: x        MICROBIOLOGY:            RECENT CULTURES:  01-03 @ 12:15  .Blood Blood-Venous  --  --  --    No growth at 24 hours  --  01-02 @ 20:36  Clean Catch Clean Catch (Midstream)  --  --  --    >=3 organisms. Probable collection contamination.  --      RADIOLOGY & ADDITIONAL STUDIES:    < from: CT Chest No Cont (01.05.24 @ 09:25) >  IMPRESSION:  Question mild stercoral colitis.  Expected postoperative changes in the chest. No fluid collection.    < end of copied text >   INFECTIOUS DISEASES FOLLOW UP-- Courtney Peters  [phone]    This is a follow up note for this  58yMale with  Non-ST elevation myocardial infarction (NSTEMI)  s/p heart transplant      ROS:  CONSTITUTIONAL:  No fever, good appetite  CARDIOVASCULAR:  No chest pain or palpitations  RESPIRATORY:  No dyspnea  GASTROINTESTINAL:  No nausea, vomiting, diarrhea, or abdominal pain  GENITOURINARY:  No dysuria  NEUROLOGIC:  No headache,     Allergies    penicillins (Unknown)    Intolerances        ANTIBIOTICS/RELEVANT:  antimicrobials  fluconAZOLE   Tablet 100 milliGRAM(s) Oral <User Schedule>  trimethoprim   80 mG/sulfamethoxazole 400 mG 1 Tablet(s) Oral daily  valGANciclovir 450 milliGRAM(s) Oral every 12 hours    immunologic:  mycophenolate mofetil 1000 milliGRAM(s) Oral every 12 hours  tacrolimus 5.5 milliGRAM(s) Oral <User Schedule>    OTHER:  acetaminophen     Tablet .. 650 milliGRAM(s) Oral every 6 hours PRN  acetaminophen   IVPB .. 1000 milliGRAM(s) IV Intermittent once  budesonide  80 MICROgram(s)/formoterol 4.5 MICROgram(s) Inhaler 1 Puff(s) Inhalation two times a day  buMETAnide 2 milliGRAM(s) Oral every 12 hours  chlorhexidine 4% Liquid 1 Application(s) Topical <User Schedule>  dextrose 5%. 1000 milliLiter(s) IV Continuous <Continuous>  dextrose 5%. 1000 milliLiter(s) IV Continuous <Continuous>  dextrose 50% Injectable 50 milliLiter(s) IV Push every 15 minutes  dextrose Oral Gel 15 Gram(s) Oral once PRN  glucagon  Injectable 1 milliGRAM(s) IntraMuscular once  heparin   Injectable 5000 Unit(s) SubCutaneous every 8 hours  hydrALAZINE 100 milliGRAM(s) Oral every 8 hours  insulin glargine Injectable (LANTUS) 33 Unit(s) SubCutaneous at bedtime  insulin lispro (ADMELOG) corrective regimen sliding scale   SubCutaneous <User Schedule>  insulin lispro (ADMELOG) corrective regimen sliding scale   SubCutaneous three times a day before meals  insulin lispro Injectable (ADMELOG) 20 Unit(s) SubCutaneous before breakfast  insulin lispro Injectable (ADMELOG) 17 Unit(s) SubCutaneous before dinner  insulin lispro Injectable (ADMELOG) 17 Unit(s) SubCutaneous before lunch  insulin regular Infusion 3 Unit(s)/Hr IV Continuous <Continuous>  lidocaine   4% Patch 3 Patch Transdermal daily  magnesium citrate Oral Solution 150 milliLiter(s) Oral once  magnesium oxide 400 milliGRAM(s) Oral three times a day with meals  melatonin 5 milliGRAM(s) Oral at bedtime  NIFEdipine XL 60 milliGRAM(s) Oral every 12 hours  OLANZapine 5 milliGRAM(s) Oral at bedtime  pantoprazole    Tablet 40 milliGRAM(s) Oral every 24 hours  polyethylene glycol 3350 17 Gram(s) Oral daily  predniSONE   Tablet 15 milliGRAM(s) Oral every 12 hours  senna 2 Tablet(s) Oral at bedtime      Objective:  Vital Signs Last 24 Hrs  T(C): 36.6 (05 Jan 2024 11:38), Max: 36.6 (04 Jan 2024 19:43)  T(F): 97.9 (05 Jan 2024 11:38), Max: 97.9 (04 Jan 2024 19:43)  HR: 73 (05 Jan 2024 11:38) (73 - 99)  BP: 97/63 (05 Jan 2024 11:38) (97/63 - 129/80)  BP(mean): --  RR: 18 (05 Jan 2024 11:38) (18 - 18)  SpO2: 97% (05 Jan 2024 11:38) (97% - 97%)    Parameters below as of 05 Jan 2024 11:38  Patient On (Oxygen Delivery Method): room air        PHYSICAL EXAM:  Constitutional:no acute distress  Eyes:MARVEL, EOMI  Ear/Nose/Throat: no oral lesions, 	  Respiratory: clear BL  Cardiovascular: S1S2 sternotomy wound CDI  Gastrointestinal:soft, (+) BS, no tenderness  Extremities:no e/e/c  No Lymphadenopathy  IV sites not inflammed.    LABS:                        11.6   16.03 )-----------( 212      ( 05 Jan 2024 07:06 )             34.1     01-05    133<L>  |  97  |  58<H>  ----------------------------<  121<H>  4.6   |  24  |  1.23    Ca    9.9      05 Jan 2024 07:05  Phos  3.5     01-04  Mg     2.0     01-04    TPro  6.4  /  Alb  4.0  /  TBili  0.3  /  DBili  x   /  AST  23  /  ALT  55<H>  /  AlkPhos  54  01-04      Urinalysis Basic - ( 05 Jan 2024 07:05 )    Color: x / Appearance: x / SG: x / pH: x  Gluc: 121 mg/dL / Ketone: x  / Bili: x / Urobili: x   Blood: x / Protein: x / Nitrite: x   Leuk Esterase: x / RBC: x / WBC x   Sq Epi: x / Non Sq Epi: x / Bacteria: x        MICROBIOLOGY:            RECENT CULTURES:  01-03 @ 12:15  .Blood Blood-Venous  --  --  --    No growth at 24 hours  --  01-02 @ 20:36  Clean Catch Clean Catch (Midstream)  --  --  --    >=3 organisms. Probable collection contamination.  --      RADIOLOGY & ADDITIONAL STUDIES:    < from: CT Chest No Cont (01.05.24 @ 09:25) >  IMPRESSION:  Question mild stercoral colitis.  Expected postoperative changes in the chest. No fluid collection.    < end of copied text >

## 2024-01-05 NOTE — PROGRESS NOTE ADULT - ASSESSMENT
57 yo male h/o htn, cad s/p pci, ICH, here with NSTEMI  s/p intubation and cath. now in CCU    NSTEMI  s/p  cath  cath results noted. multi vessel dz., CTSx f/u.    pt with vtach/fib arrest again on 11/8. s/p ACLS and ROSC.   in ccu with iabp   plan for transplant as per HF and transplant team  transplant w/u ongoing.   s/p colonoscopy 11/17. normal  now listed for transplant as of 11/22 12/25: pt s/p heart transplant last night. remains intubated with iabp in CTU.   12/26: pt extubated to high flow this am. IABP currently being removed. pt a0x3.   12/27: pt feels well. walked with PT this am. currently comforable sitting in chair. IABP removed. wean off pressors as able.   12/28-29: no acute events o/n. weaning down ionotropes/pressors as able. immunosuppressives as per transplant team. PT  12/30: feels well. pressors/ionotropes weaned off. removal of chest tubes as able. immunosupressives as per id. PT.  12/31: feels well. tx out of ctu to step down unit. cont care as per ctsx team. PT  1/1: no acute events o/n. to transition off insulin gtt today. PT. d/w family bedside  1/2: no acute events o/n. plan on removal of a line and chest tubes today as per ctsx. dm mngt as per endo  1/3: feels well. s/p cardiac biopsy yesterday. worked with PT today. one CT remaining.   1/4: no acute events o/n. FS improved. mngt of CT as per CTSx   1/5: doing well. no c/o. leukocytosis noted. steroids likely contributing.  CT noted. pt reports +BM today      mngt as per CTSx team          Advanced care planning was discussed with patient and family.  Advanced care planning forms were reviewed and discussed as appropriate.  Differential diagnosis and plan of care discussed with patient after the evaluation.   Pain assessed and judicious use of narcotics when appropriate was discussed.  Importance of Fall prevention discussed.  Counseling on Smoking and Alcohol cessation was offered when appropriate.  Counseling on Diet, exercise, and medication compliance was done.       Approx 75 minutes spent.

## 2024-01-05 NOTE — PROGRESS NOTE ADULT - PROBLEM SELECTOR PLAN 1
- IABP removed 12/26  - Sabrina and Epi weaned off 12/26  - Procardia XL 60 mg BID  - Bumex TID  - 1st RHC/EMBx on Tuesday 1/2  - DC plan eventual home

## 2024-01-05 NOTE — CHART NOTE - NSCHARTNOTEFT_GEN_A_CORE
Brief Nutrition Chart Note    Chart reviewed, events noted.    RD attempted to review heart transplant diet education with pt. Pt has written materials at bedside provided by this RD, however pt with  complaints this morning. RD escalated to . RD to follow up with verbal nutrition education as able.    RD to remain available and follow-up as medically appropriate.  Sultana Velazquez, MS, RD, CDN, CNSC avail. on [employer] Teams

## 2024-01-06 DIAGNOSIS — K59.00 CONSTIPATION, UNSPECIFIED: ICD-10-CM

## 2024-01-06 LAB
ALBUMIN SERPL ELPH-MCNC: 3.7 G/DL — SIGNIFICANT CHANGE UP (ref 3.3–5)
ALBUMIN SERPL ELPH-MCNC: 3.7 G/DL — SIGNIFICANT CHANGE UP (ref 3.3–5)
ALP SERPL-CCNC: 53 U/L — SIGNIFICANT CHANGE UP (ref 40–120)
ALP SERPL-CCNC: 53 U/L — SIGNIFICANT CHANGE UP (ref 40–120)
ALT FLD-CCNC: 63 U/L — HIGH (ref 10–45)
ALT FLD-CCNC: 63 U/L — HIGH (ref 10–45)
ANION GAP SERPL CALC-SCNC: 16 MMOL/L — SIGNIFICANT CHANGE UP (ref 5–17)
ANION GAP SERPL CALC-SCNC: 16 MMOL/L — SIGNIFICANT CHANGE UP (ref 5–17)
APPEARANCE UR: CLEAR — SIGNIFICANT CHANGE UP
APPEARANCE UR: CLEAR — SIGNIFICANT CHANGE UP
AST SERPL-CCNC: 32 U/L — SIGNIFICANT CHANGE UP (ref 10–40)
AST SERPL-CCNC: 32 U/L — SIGNIFICANT CHANGE UP (ref 10–40)
BILIRUB SERPL-MCNC: 0.3 MG/DL — SIGNIFICANT CHANGE UP (ref 0.2–1.2)
BILIRUB SERPL-MCNC: 0.3 MG/DL — SIGNIFICANT CHANGE UP (ref 0.2–1.2)
BILIRUB UR-MCNC: NEGATIVE — SIGNIFICANT CHANGE UP
BILIRUB UR-MCNC: NEGATIVE — SIGNIFICANT CHANGE UP
BUN SERPL-MCNC: 44 MG/DL — HIGH (ref 7–23)
BUN SERPL-MCNC: 44 MG/DL — HIGH (ref 7–23)
CALCIUM SERPL-MCNC: 9.8 MG/DL — SIGNIFICANT CHANGE UP (ref 8.4–10.5)
CALCIUM SERPL-MCNC: 9.8 MG/DL — SIGNIFICANT CHANGE UP (ref 8.4–10.5)
CHLORIDE SERPL-SCNC: 92 MMOL/L — LOW (ref 96–108)
CHLORIDE SERPL-SCNC: 92 MMOL/L — LOW (ref 96–108)
CO2 SERPL-SCNC: 24 MMOL/L — SIGNIFICANT CHANGE UP (ref 22–31)
CO2 SERPL-SCNC: 24 MMOL/L — SIGNIFICANT CHANGE UP (ref 22–31)
COLOR SPEC: YELLOW — SIGNIFICANT CHANGE UP
COLOR SPEC: YELLOW — SIGNIFICANT CHANGE UP
CREAT SERPL-MCNC: 1.12 MG/DL — SIGNIFICANT CHANGE UP (ref 0.5–1.3)
CREAT SERPL-MCNC: 1.12 MG/DL — SIGNIFICANT CHANGE UP (ref 0.5–1.3)
DIFF PNL FLD: NEGATIVE — SIGNIFICANT CHANGE UP
DIFF PNL FLD: NEGATIVE — SIGNIFICANT CHANGE UP
EGFR: 76 ML/MIN/1.73M2 — SIGNIFICANT CHANGE UP
EGFR: 76 ML/MIN/1.73M2 — SIGNIFICANT CHANGE UP
GLUCOSE BLDC GLUCOMTR-MCNC: 115 MG/DL — HIGH (ref 70–99)
GLUCOSE BLDC GLUCOMTR-MCNC: 115 MG/DL — HIGH (ref 70–99)
GLUCOSE BLDC GLUCOMTR-MCNC: 136 MG/DL — HIGH (ref 70–99)
GLUCOSE BLDC GLUCOMTR-MCNC: 136 MG/DL — HIGH (ref 70–99)
GLUCOSE BLDC GLUCOMTR-MCNC: 181 MG/DL — HIGH (ref 70–99)
GLUCOSE BLDC GLUCOMTR-MCNC: 181 MG/DL — HIGH (ref 70–99)
GLUCOSE BLDC GLUCOMTR-MCNC: 194 MG/DL — HIGH (ref 70–99)
GLUCOSE BLDC GLUCOMTR-MCNC: 194 MG/DL — HIGH (ref 70–99)
GLUCOSE BLDC GLUCOMTR-MCNC: 198 MG/DL — HIGH (ref 70–99)
GLUCOSE BLDC GLUCOMTR-MCNC: 198 MG/DL — HIGH (ref 70–99)
GLUCOSE BLDC GLUCOMTR-MCNC: 278 MG/DL — HIGH (ref 70–99)
GLUCOSE BLDC GLUCOMTR-MCNC: 278 MG/DL — HIGH (ref 70–99)
GLUCOSE SERPL-MCNC: 156 MG/DL — HIGH (ref 70–99)
GLUCOSE SERPL-MCNC: 156 MG/DL — HIGH (ref 70–99)
GLUCOSE UR QL: NEGATIVE MG/DL — SIGNIFICANT CHANGE UP
GLUCOSE UR QL: NEGATIVE MG/DL — SIGNIFICANT CHANGE UP
HCT VFR BLD CALC: 33.3 % — LOW (ref 39–50)
HCT VFR BLD CALC: 33.3 % — LOW (ref 39–50)
HGB BLD-MCNC: 11.3 G/DL — LOW (ref 13–17)
HGB BLD-MCNC: 11.3 G/DL — LOW (ref 13–17)
KETONES UR-MCNC: NEGATIVE MG/DL — SIGNIFICANT CHANGE UP
KETONES UR-MCNC: NEGATIVE MG/DL — SIGNIFICANT CHANGE UP
LEUKOCYTE ESTERASE UR-ACNC: NEGATIVE — SIGNIFICANT CHANGE UP
LEUKOCYTE ESTERASE UR-ACNC: NEGATIVE — SIGNIFICANT CHANGE UP
MCHC RBC-ENTMCNC: 30.1 PG — SIGNIFICANT CHANGE UP (ref 27–34)
MCHC RBC-ENTMCNC: 30.1 PG — SIGNIFICANT CHANGE UP (ref 27–34)
MCHC RBC-ENTMCNC: 33.9 GM/DL — SIGNIFICANT CHANGE UP (ref 32–36)
MCHC RBC-ENTMCNC: 33.9 GM/DL — SIGNIFICANT CHANGE UP (ref 32–36)
MCV RBC AUTO: 88.6 FL — SIGNIFICANT CHANGE UP (ref 80–100)
MCV RBC AUTO: 88.6 FL — SIGNIFICANT CHANGE UP (ref 80–100)
NITRITE UR-MCNC: NEGATIVE — SIGNIFICANT CHANGE UP
NITRITE UR-MCNC: NEGATIVE — SIGNIFICANT CHANGE UP
NRBC # BLD: 0 /100 WBCS — SIGNIFICANT CHANGE UP (ref 0–0)
NRBC # BLD: 0 /100 WBCS — SIGNIFICANT CHANGE UP (ref 0–0)
PH UR: 6.5 — SIGNIFICANT CHANGE UP (ref 5–8)
PH UR: 6.5 — SIGNIFICANT CHANGE UP (ref 5–8)
PLATELET # BLD AUTO: 226 K/UL — SIGNIFICANT CHANGE UP (ref 150–400)
PLATELET # BLD AUTO: 226 K/UL — SIGNIFICANT CHANGE UP (ref 150–400)
POTASSIUM SERPL-MCNC: 4.4 MMOL/L — SIGNIFICANT CHANGE UP (ref 3.5–5.3)
POTASSIUM SERPL-MCNC: 4.4 MMOL/L — SIGNIFICANT CHANGE UP (ref 3.5–5.3)
POTASSIUM SERPL-SCNC: 4.4 MMOL/L — SIGNIFICANT CHANGE UP (ref 3.5–5.3)
POTASSIUM SERPL-SCNC: 4.4 MMOL/L — SIGNIFICANT CHANGE UP (ref 3.5–5.3)
PROT SERPL-MCNC: 6.4 G/DL — SIGNIFICANT CHANGE UP (ref 6–8.3)
PROT SERPL-MCNC: 6.4 G/DL — SIGNIFICANT CHANGE UP (ref 6–8.3)
PROT UR-MCNC: NEGATIVE MG/DL — SIGNIFICANT CHANGE UP
PROT UR-MCNC: NEGATIVE MG/DL — SIGNIFICANT CHANGE UP
RBC # BLD: 3.76 M/UL — LOW (ref 4.2–5.8)
RBC # BLD: 3.76 M/UL — LOW (ref 4.2–5.8)
RBC # FLD: 16 % — HIGH (ref 10.3–14.5)
RBC # FLD: 16 % — HIGH (ref 10.3–14.5)
SODIUM SERPL-SCNC: 132 MMOL/L — LOW (ref 135–145)
SODIUM SERPL-SCNC: 132 MMOL/L — LOW (ref 135–145)
SP GR SPEC: 1.01 — SIGNIFICANT CHANGE UP (ref 1–1.03)
SP GR SPEC: 1.01 — SIGNIFICANT CHANGE UP (ref 1–1.03)
TACROLIMUS SERPL-MCNC: 10.8 NG/ML — SIGNIFICANT CHANGE UP
TACROLIMUS SERPL-MCNC: 10.8 NG/ML — SIGNIFICANT CHANGE UP
UROBILINOGEN FLD QL: 0.2 MG/DL — SIGNIFICANT CHANGE UP (ref 0.2–1)
UROBILINOGEN FLD QL: 0.2 MG/DL — SIGNIFICANT CHANGE UP (ref 0.2–1)
WBC # BLD: 18.24 K/UL — HIGH (ref 3.8–10.5)
WBC # BLD: 18.24 K/UL — HIGH (ref 3.8–10.5)
WBC # FLD AUTO: 18.24 K/UL — HIGH (ref 3.8–10.5)
WBC # FLD AUTO: 18.24 K/UL — HIGH (ref 3.8–10.5)

## 2024-01-06 PROCEDURE — 99232 SBSQ HOSP IP/OBS MODERATE 35: CPT

## 2024-01-06 PROCEDURE — 74018 RADEX ABDOMEN 1 VIEW: CPT | Mod: 26

## 2024-01-06 PROCEDURE — 99232 SBSQ HOSP IP/OBS MODERATE 35: CPT | Mod: 24

## 2024-01-06 PROCEDURE — 99234 HOSP IP/OBS SM DT SF/LOW 45: CPT | Mod: 25

## 2024-01-06 PROCEDURE — 71045 X-RAY EXAM CHEST 1 VIEW: CPT | Mod: 26

## 2024-01-06 RX ORDER — MULTIVIT WITH MIN/MFOLATE/K2 340-15/3 G
150 POWDER (GRAM) ORAL ONCE
Refills: 0 | Status: COMPLETED | OUTPATIENT
Start: 2024-01-06 | End: 2024-01-06

## 2024-01-06 RX ORDER — SORBITOL SOLUTION 70 %
30 SOLUTION, ORAL MISCELLANEOUS ONCE
Refills: 0 | Status: COMPLETED | OUTPATIENT
Start: 2024-01-06 | End: 2024-01-06

## 2024-01-06 RX ADMIN — HEPARIN SODIUM 5000 UNIT(S): 5000 INJECTION INTRAVENOUS; SUBCUTANEOUS at 22:21

## 2024-01-06 RX ADMIN — Medication 150 MILLILITER(S): at 17:22

## 2024-01-06 RX ADMIN — FLUCONAZOLE 100 MILLIGRAM(S): 150 TABLET ORAL at 12:50

## 2024-01-06 RX ADMIN — Medication 100 MILLIGRAM(S): at 14:18

## 2024-01-06 RX ADMIN — OLANZAPINE 5 MILLIGRAM(S): 15 TABLET, FILM COATED ORAL at 22:21

## 2024-01-06 RX ADMIN — TACROLIMUS 6.5 MILLIGRAM(S): 5 CAPSULE ORAL at 20:04

## 2024-01-06 RX ADMIN — MAGNESIUM OXIDE 400 MG ORAL TABLET 400 MILLIGRAM(S): 241.3 TABLET ORAL at 08:12

## 2024-01-06 RX ADMIN — VALGANCICLOVIR 450 MILLIGRAM(S): 450 TABLET, FILM COATED ORAL at 20:03

## 2024-01-06 RX ADMIN — VALGANCICLOVIR 450 MILLIGRAM(S): 450 TABLET, FILM COATED ORAL at 08:11

## 2024-01-06 RX ADMIN — Medication 30 MILLILITER(S): at 04:37

## 2024-01-06 RX ADMIN — TACROLIMUS 6.5 MILLIGRAM(S): 5 CAPSULE ORAL at 08:12

## 2024-01-06 RX ADMIN — PANTOPRAZOLE SODIUM 40 MILLIGRAM(S): 20 TABLET, DELAYED RELEASE ORAL at 08:12

## 2024-01-06 RX ADMIN — Medication 60 MILLIGRAM(S): at 08:13

## 2024-01-06 RX ADMIN — MYCOPHENOLATE MOFETIL 1000 MILLIGRAM(S): 250 CAPSULE ORAL at 08:11

## 2024-01-06 RX ADMIN — Medication 60 MILLIGRAM(S): at 20:04

## 2024-01-06 RX ADMIN — INSULIN GLARGINE 33 UNIT(S): 100 INJECTION, SOLUTION SUBCUTANEOUS at 22:19

## 2024-01-06 RX ADMIN — BUDESONIDE AND FORMOTEROL FUMARATE DIHYDRATE 1 PUFF(S): 160; 4.5 AEROSOL RESPIRATORY (INHALATION) at 06:07

## 2024-01-06 RX ADMIN — MAGNESIUM OXIDE 400 MG ORAL TABLET 400 MILLIGRAM(S): 241.3 TABLET ORAL at 17:22

## 2024-01-06 RX ADMIN — BUMETANIDE 2 MILLIGRAM(S): 0.25 INJECTION INTRAMUSCULAR; INTRAVENOUS at 17:22

## 2024-01-06 RX ADMIN — Medication 100 MILLIGRAM(S): at 22:21

## 2024-01-06 RX ADMIN — MAGNESIUM OXIDE 400 MG ORAL TABLET 400 MILLIGRAM(S): 241.3 TABLET ORAL at 14:18

## 2024-01-06 RX ADMIN — BUMETANIDE 2 MILLIGRAM(S): 0.25 INJECTION INTRAMUSCULAR; INTRAVENOUS at 06:07

## 2024-01-06 RX ADMIN — Medication 15 MILLIGRAM(S): at 08:12

## 2024-01-06 RX ADMIN — Medication 2: at 22:21

## 2024-01-06 RX ADMIN — POLYETHYLENE GLYCOL 3350 17 GRAM(S): 17 POWDER, FOR SOLUTION ORAL at 18:55

## 2024-01-06 RX ADMIN — Medication 100 MILLIGRAM(S): at 08:13

## 2024-01-06 RX ADMIN — Medication 15 MILLIGRAM(S): at 20:04

## 2024-01-06 RX ADMIN — Medication 1 TABLET(S): at 12:49

## 2024-01-06 RX ADMIN — MYCOPHENOLATE MOFETIL 1000 MILLIGRAM(S): 250 CAPSULE ORAL at 20:04

## 2024-01-06 RX ADMIN — Medication 17 UNIT(S): at 19:26

## 2024-01-06 RX ADMIN — HEPARIN SODIUM 5000 UNIT(S): 5000 INJECTION INTRAVENOUS; SUBCUTANEOUS at 13:26

## 2024-01-06 RX ADMIN — Medication 2: at 12:56

## 2024-01-06 RX ADMIN — Medication 2: at 09:46

## 2024-01-06 RX ADMIN — BUDESONIDE AND FORMOTEROL FUMARATE DIHYDRATE 1 PUFF(S): 160; 4.5 AEROSOL RESPIRATORY (INHALATION) at 17:23

## 2024-01-06 RX ADMIN — HEPARIN SODIUM 5000 UNIT(S): 5000 INJECTION INTRAVENOUS; SUBCUTANEOUS at 06:07

## 2024-01-06 RX ADMIN — SENNA PLUS 2 TABLET(S): 8.6 TABLET ORAL at 22:20

## 2024-01-06 NOTE — PROGRESS NOTE ADULT - ASSESSMENT
59 yo male h/o htn, cad s/p pci, ICH, here with NSTEMI  s/p intubation and cath. now in CCU    NSTEMI  s/p  cath  cath results noted. multi vessel dz., CTSx f/u.    pt with vtach/fib arrest again on 11/8. s/p ACLS and ROSC.   in ccu with iabp   plan for transplant as per HF and transplant team  transplant w/u ongoing.   s/p colonoscopy 11/17. normal  now listed for transplant as of 11/22 12/25: pt s/p heart transplant last night. remains intubated with iabp in CTU.   12/26: pt extubated to high flow this am. IABP currently being removed. pt a0x3.   12/27: pt feels well. walked with PT this am. currently comforable sitting in chair. IABP removed. wean off pressors as able.   12/28-29: no acute events o/n. weaning down ionotropes/pressors as able. immunosuppressives as per transplant team. PT  12/30: feels well. pressors/ionotropes weaned off. removal of chest tubes as able. immunosupressives as per id. PT.  12/31: feels well. tx out of ctu to step down unit. cont care as per ctsx team. PT  1/1: no acute events o/n. to transition off insulin gtt today. PT. d/w family bedside  1/2: no acute events o/n. plan on removal of a line and chest tubes today as per ctsx. dm mngt as per endo  1/3: feels well. s/p cardiac biopsy yesterday. worked with PT today. one CT remaining.   1/4: no acute events o/n. FS improved. mngt of CT as per CTSx   1/5: doing well. no c/o. leukocytosis noted. steroids likely contributing.  CT noted. pt reports +BM today  1/6: no acute events. +BM today. cont care as per CTSx team. eager to go home       mngt as per CTSx team          Advanced care planning was discussed with patient and family.  Advanced care planning forms were reviewed and discussed as appropriate.  Differential diagnosis and plan of care discussed with patient after the evaluation.   Pain assessed and judicious use of narcotics when appropriate was discussed.  Importance of Fall prevention discussed.  Counseling on Smoking and Alcohol cessation was offered when appropriate.  Counseling on Diet, exercise, and medication compliance was done.       Approx 75 minutes spent.

## 2024-01-06 NOTE — PROGRESS NOTE ADULT - PROBLEM SELECTOR PLAN 6
- Pt noting significant constipation and CT showing stercoral colitis 1/5  - S/p MgCO3  Plan:  - Start lactulose 20 QID until pt starts to have regular BMs  - Can also use bisacodyl suppository if pt amenable   - Encourage ambulation   - If no success with lactulose can try GoLytely 1L

## 2024-01-06 NOTE — PROGRESS NOTE ADULT - PROBLEM SELECTOR PLAN 5
- pretransplant was noted to have positive BCx staph epi, Enterococcus faecalis and Cutibacterium   - s/p IABP exchange from RFA to LFA on 11/20.  - appreciated transplant ID recs.  - post transplant has been afebrile however was noted to have bump in WBC  - RVP negative  - Blood cx NGTD  - procalcitonin normal  - awaiting read from CT C/A/P from 1/5 - pretransplant was noted to have positive BCx staph epi, Enterococcus faecalis and Cutibacterium   - s/p IABP exchange from RFA to LFA on 11/20.  - appreciated transplant ID recs.  - post transplant has been afebrile however was noted to have bump in WBC  - RVP negative  - Blood cx NGTD  - procalcitonin normal  - CT unrevealing

## 2024-01-06 NOTE — PROGRESS NOTE ADULT - SUBJECTIVE AND OBJECTIVE BOX
S:  c/o constipation and feeling unwell  no sob    Telemetry:  SR     Vital Signs Last 24 Hrs  T(C): 36.3 (24 @ 13:04), Max: 37 (24 @ 05:54)  T(F): 97.4 (24 @ 13:04), Max: 98.6 (24 @ 05:54)  HR: 96 (24 @ 13:04) (92 - 115)  BP: 105/75 (24 @ 13:04) (97/59 - 126/77)  RR: 18 (24 @ 13:04) (18 - 18)  SpO2: 97% (24 @ 13:04) (96% - 97%)                    @ 07:01  -   @ 07:00  --------------------------------------------------------  IN: 440 mL / OUT: 2200 mL / NET: -1760 mL     @ 07:01  -   @ 13:29  --------------------------------------------------------  IN: 100 mL / OUT: 0 mL / NET: 100 mL          Daily     Daily Weight in k.2 (2024 16:00)        CAPILLARY BLOOD GLUCOSE      POCT Blood Glucose.: 181 mg/dL (2024 12:54)  POCT Blood Glucose.: 194 mg/dL (2024 12:51)  POCT Blood Glucose.: 198 mg/dL (2024 09:21)  POCT Blood Glucose.: 115 mg/dL (2024 02:02)  POCT Blood Glucose.: 96 mg/dL (2024 21:30)  POCT Blood Glucose.: 177 mg/dL (2024 16:32)          Drains:     MS         [  ] Drainage:                 L Pleural  [  ]  Drainage:                R Pleural  [  ]  Drainage:    Pacing Wires        [  ]   Settings:                                  Isolated  [  ]    Coumadin    [ ] YES          [ x ]      NO         Reason:                                 11.3   18.24 )-----------( 226      ( 2024 07:16 )             33.3           132<L>  |  92<L>  |  44<H>  ----------------------------<  156<H>  4.4   |  24  |  1.12    Ca    9.8      2024 07:17    TPro  6.4  /  Alb  3.7  /  TBili  0.3  /  DBili  x   /  AST  32  /  ALT  63<H>  /  AlkPhos  53            PHYSICAL EXAM:    Neurology: alert and oriented x 3, nonfocal, no gross deficits    CV : S1S2 RRR    Sternal Wound :  CDI , Stable    Lungs:  clear to ausc.  no w/r/r    Abdomen: soft, nontender, nondistended, positive bowel sounds, small tiny  bowel movement     rectal performed --no stool      Extremities:    no edema           acetaminophen     Tablet .. 650 milliGRAM(s) Oral every 6 hours PRN  acetaminophen   IVPB .. 1000 milliGRAM(s) IV Intermittent once  budesonide  80 MICROgram(s)/formoterol 4.5 MICROgram(s) Inhaler 1 Puff(s) Inhalation two times a day  buMETAnide 2 milliGRAM(s) Oral every 12 hours  chlorhexidine 4% Liquid 1 Application(s) Topical <User Schedule>  dextrose 5%. 1000 milliLiter(s) IV Continuous <Continuous>  dextrose 5%. 1000 milliLiter(s) IV Continuous <Continuous>  dextrose 50% Injectable 50 milliLiter(s) IV Push every 15 minutes  dextrose Oral Gel 15 Gram(s) Oral once PRN  fluconAZOLE   Tablet 100 milliGRAM(s) Oral <User Schedule>  glucagon  Injectable 1 milliGRAM(s) IntraMuscular once  heparin   Injectable 5000 Unit(s) SubCutaneous every 8 hours  hydrALAZINE 100 milliGRAM(s) Oral every 8 hours  insulin glargine Injectable (LANTUS) 33 Unit(s) SubCutaneous at bedtime  insulin lispro (ADMELOG) corrective regimen sliding scale   SubCutaneous three times a day before meals  insulin lispro (ADMELOG) corrective regimen sliding scale   SubCutaneous <User Schedule>  insulin lispro Injectable (ADMELOG) 17 Unit(s) SubCutaneous before lunch  insulin lispro Injectable (ADMELOG) 20 Unit(s) SubCutaneous before breakfast  insulin lispro Injectable (ADMELOG) 17 Unit(s) SubCutaneous before dinner  insulin regular Infusion 3 Unit(s)/Hr IV Continuous <Continuous>  lidocaine   4% Patch 3 Patch Transdermal daily  magnesium oxide 400 milliGRAM(s) Oral three times a day with meals  melatonin 5 milliGRAM(s) Oral at bedtime  mycophenolate mofetil 1000 milliGRAM(s) Oral every 12 hours  NIFEdipine XL 60 milliGRAM(s) Oral every 12 hours  OLANZapine 5 milliGRAM(s) Oral at bedtime  pantoprazole    Tablet 40 milliGRAM(s) Oral every 24 hours  polyethylene glycol 3350 17 Gram(s) Oral daily  predniSONE   Tablet 15 milliGRAM(s) Oral every 12 hours  senna 2 Tablet(s) Oral at bedtime  tacrolimus 6.5 milliGRAM(s) Oral <User Schedule>  trimethoprim   80 mG/sulfamethoxazole 400 mG 1 Tablet(s) Oral daily  valGANciclovir 450 milliGRAM(s) Oral every 12 hours                              Physical Therapy Rec:   Home  [ x ]   Home w/ PT  [  ]  Rehab  [  ]    Discussed with Cardiothoracic Team at AM rounds.

## 2024-01-06 NOTE — PROGRESS NOTE ADULT - SUBJECTIVE AND OBJECTIVE BOX
ID: 57 yo M with HFrEF (LVIDd 6.4 cm, LVEF 32%), CAD s/p PCI (2008), HTN, DMT2 (A1c 8.3%) and CVA s/p TPA (2018), recently treated for PNA who initially presented to [facility] via EMS after syncope reportedly requiring defibrillation. Treated for ACS but left AMA to come to [facility]. On arrival ECG with ST depression in lateral leads. Intubated 11/1 for respiratory failure and was found to have COVID. L/RHC 11/1revealing  of LAD and RCA, elevated filling pressures and CI of 1.5 prompting placement of IABP. Extubated 11/3. IABP was weaned to off 11/7, however the following day developed PMVT cardiac arrest with prompt CPR and defibrillation, started on IV Lidocaine/Amio and IABP ultimately replaced on 11/9. During this admission was found to be bacteremic for which she was treated for Staph epi, Enterococcus faecalis, Cutibacterium with IV abx.  Was listed Status 2, ABO A, PRA 0% (12/12). A suitable donor was identified and on 12/25, he underwent OHT (ischemic Time: 253min, CMV -/+, Toxo -/-). The following day, extubated and IABP removed. Overall progressing well. He underwent RHC/EMBx with revealed elevated filling pressures and normal cardiac output. His diuretics have been increased and he is responding well. He continues to have elevated WBC however remains afebrile and off antibiotics. Currently undergoing infectious work up. Next RHC/EMBx schedule Tuesday 1/9    Patient seen and examined at bedside.    Overnight Events: NAEON, CT completed and only remarkable for stercoral colitis. Pt notes he does not remember his last BM, not responding to current laxative regimen. Mood and affect appropriate today. Feels overall ok, no complaints. Will plan to inc bowel regimen and F/U infectious work up     Review of Systems: Negative except as per HPI     Current Meds:  acetaminophen     Tablet .. 650 milliGRAM(s) Oral every 6 hours PRN  acetaminophen   IVPB .. 1000 milliGRAM(s) IV Intermittent once  budesonide  80 MICROgram(s)/formoterol 4.5 MICROgram(s) Inhaler 1 Puff(s) Inhalation two times a day  buMETAnide 2 milliGRAM(s) Oral every 12 hours  chlorhexidine 4% Liquid 1 Application(s) Topical <User Schedule>  dextrose 5%. 1000 milliLiter(s) IV Continuous <Continuous>  dextrose 5%. 1000 milliLiter(s) IV Continuous <Continuous>  dextrose 50% Injectable 50 milliLiter(s) IV Push every 15 minutes  dextrose Oral Gel 15 Gram(s) Oral once PRN  fluconAZOLE   Tablet 100 milliGRAM(s) Oral <User Schedule>  glucagon  Injectable 1 milliGRAM(s) IntraMuscular once  heparin   Injectable 5000 Unit(s) SubCutaneous every 8 hours  hydrALAZINE 100 milliGRAM(s) Oral every 8 hours  insulin glargine Injectable (LANTUS) 33 Unit(s) SubCutaneous at bedtime  insulin lispro (ADMELOG) corrective regimen sliding scale   SubCutaneous three times a day before meals  insulin lispro (ADMELOG) corrective regimen sliding scale   SubCutaneous <User Schedule>  insulin lispro Injectable (ADMELOG) 17 Unit(s) SubCutaneous before lunch  insulin lispro Injectable (ADMELOG) 20 Unit(s) SubCutaneous before breakfast  insulin lispro Injectable (ADMELOG) 17 Unit(s) SubCutaneous before dinner  insulin regular Infusion 3 Unit(s)/Hr IV Continuous <Continuous>  lidocaine   4% Patch 3 Patch Transdermal daily  magnesium oxide 400 milliGRAM(s) Oral three times a day with meals  melatonin 5 milliGRAM(s) Oral at bedtime  mycophenolate mofetil 1000 milliGRAM(s) Oral every 12 hours  NIFEdipine XL 60 milliGRAM(s) Oral every 12 hours  OLANZapine 5 milliGRAM(s) Oral at bedtime  pantoprazole    Tablet 40 milliGRAM(s) Oral every 24 hours  polyethylene glycol 3350 17 Gram(s) Oral daily  predniSONE   Tablet 15 milliGRAM(s) Oral every 12 hours  senna 2 Tablet(s) Oral at bedtime  tacrolimus 6.5 milliGRAM(s) Oral <User Schedule>  trimethoprim   80 mG/sulfamethoxazole 400 mG 1 Tablet(s) Oral daily  valGANciclovir 450 milliGRAM(s) Oral every 12 hours      Vitals:  T(F): 98.6 (01-06), Max: 98.6 (01-06)  HR: 95 (01-06) (73 - 115)  BP: 101/72 (01-06) (97/59 - 126/77)  RR: 18 (01-06)  SpO2: 96% (01-06)  I&O's Summary    05 Jan 2024 07:01  -  06 Jan 2024 07:00  --------------------------------------------------------  IN: 440 mL / OUT: 2200 mL / NET: -1760 mL        Physical Exam:  Appearance: No acute distress, lethargic   Eyes: PERRL, EOMI, pink conjunctiva  HEENT: Normal oral mucosa  Cardiovascular: S/p sternotomy, RRR, S1, S2, no murmurs, rubs, or gallops; no edema; no JVD  Respiratory: Clear to auscultation bilaterally  Gastrointestinal: soft, non-tender, non-distended with normal bowel sounds  Musculoskeletal: No clubbing; no joint deformity   Neurologic: Non-focal  Lymphatic: No lymphadenopathy  Psychiatry: AAOx3, mood & affect appropriate  Skin: No rashes, ecchymoses, or cyanosis                          11.3   18.24 )-----------( 226      ( 06 Jan 2024 07:16 )             33.3     01-06    132<L>  |  92<L>  |  44<H>  ----------------------------<  156<H>  4.4   |  24  |  1.12    Ca    9.8      06 Jan 2024 07:17    TPro  6.4  /  Alb  3.7  /  TBili  0.3  /  DBili  x   /  AST  32  /  ALT  63<H>  /  AlkPhos  53  01-06      New ECG(s): Personally reviewed     ID: 57 yo M with HFrEF (LVIDd 6.4 cm, LVEF 32%), CAD s/p PCI (2008), HTN, DMT2 (A1c 8.3%) and CVA s/p TPA (2018), recently treated for PNA who initially presented to [facility] via EMS after syncope reportedly requiring defibrillation. Treated for ACS but left AMA to come to [facility]. On arrival ECG with ST depression in lateral leads. Intubated 11/1 for respiratory failure and was found to have COVID. L/RHC 11/1revealing  of LAD and RCA, elevated filling pressures and CI of 1.5 prompting placement of IABP. Extubated 11/3. IABP was weaned to off 11/7, however the following day developed PMVT cardiac arrest with prompt CPR and defibrillation, started on IV Lidocaine/Amio and IABP ultimately replaced on 11/9. During this admission was found to be bacteremic for which she was treated for Staph epi, Enterococcus faecalis, Cutibacterium with IV abx.  Was listed Status 2, ABO A, PRA 0% (12/12). A suitable donor was identified and on 12/25, he underwent OHT (ischemic Time: 253min, CMV -/+, Toxo -/-). The following day, extubated and IABP removed. Overall progressing well. He underwent RHC/EMBx with revealed elevated filling pressures and normal cardiac output. His diuretics have been increased and he is responding well. He continues to have elevated WBC however remains afebrile and off antibiotics. Currently undergoing infectious work up. Next RHC/EMBx schedule Tuesday 1/9    Patient seen and examined at bedside.    Overnight Events: NAEON, CT completed and only remarkable for stercoral colitis. Pt notes he does not remember his last BM, not responding to current laxative regimen. Mood and affect appropriate today. Feels overall ok, no complaints. Will plan to inc bowel regimen and F/U infectious work up     Review of Systems: Negative except as per HPI     Current Meds:  acetaminophen     Tablet .. 650 milliGRAM(s) Oral every 6 hours PRN  acetaminophen   IVPB .. 1000 milliGRAM(s) IV Intermittent once  budesonide  80 MICROgram(s)/formoterol 4.5 MICROgram(s) Inhaler 1 Puff(s) Inhalation two times a day  buMETAnide 2 milliGRAM(s) Oral every 12 hours  chlorhexidine 4% Liquid 1 Application(s) Topical <User Schedule>  dextrose 5%. 1000 milliLiter(s) IV Continuous <Continuous>  dextrose 5%. 1000 milliLiter(s) IV Continuous <Continuous>  dextrose 50% Injectable 50 milliLiter(s) IV Push every 15 minutes  dextrose Oral Gel 15 Gram(s) Oral once PRN  fluconAZOLE   Tablet 100 milliGRAM(s) Oral <User Schedule>  glucagon  Injectable 1 milliGRAM(s) IntraMuscular once  heparin   Injectable 5000 Unit(s) SubCutaneous every 8 hours  hydrALAZINE 100 milliGRAM(s) Oral every 8 hours  insulin glargine Injectable (LANTUS) 33 Unit(s) SubCutaneous at bedtime  insulin lispro (ADMELOG) corrective regimen sliding scale   SubCutaneous three times a day before meals  insulin lispro (ADMELOG) corrective regimen sliding scale   SubCutaneous <User Schedule>  insulin lispro Injectable (ADMELOG) 17 Unit(s) SubCutaneous before lunch  insulin lispro Injectable (ADMELOG) 20 Unit(s) SubCutaneous before breakfast  insulin lispro Injectable (ADMELOG) 17 Unit(s) SubCutaneous before dinner  insulin regular Infusion 3 Unit(s)/Hr IV Continuous <Continuous>  lidocaine   4% Patch 3 Patch Transdermal daily  magnesium oxide 400 milliGRAM(s) Oral three times a day with meals  melatonin 5 milliGRAM(s) Oral at bedtime  mycophenolate mofetil 1000 milliGRAM(s) Oral every 12 hours  NIFEdipine XL 60 milliGRAM(s) Oral every 12 hours  OLANZapine 5 milliGRAM(s) Oral at bedtime  pantoprazole    Tablet 40 milliGRAM(s) Oral every 24 hours  polyethylene glycol 3350 17 Gram(s) Oral daily  predniSONE   Tablet 15 milliGRAM(s) Oral every 12 hours  senna 2 Tablet(s) Oral at bedtime  tacrolimus 6.5 milliGRAM(s) Oral <User Schedule>  trimethoprim   80 mG/sulfamethoxazole 400 mG 1 Tablet(s) Oral daily  valGANciclovir 450 milliGRAM(s) Oral every 12 hours      Vitals:  T(F): 98.6 (01-06), Max: 98.6 (01-06)  HR: 95 (01-06) (73 - 115)  BP: 101/72 (01-06) (97/59 - 126/77)  RR: 18 (01-06)  SpO2: 96% (01-06)  I&O's Summary    05 Jan 2024 07:01  -  06 Jan 2024 07:00  --------------------------------------------------------  IN: 440 mL / OUT: 2200 mL / NET: -1760 mL        Physical Exam:  Appearance: No acute distress, lethargic   Eyes: PERRL, EOMI, pink conjunctiva  HEENT: Normal oral mucosa  Cardiovascular: S/p sternotomy, RRR, S1, S2, no murmurs, rubs, or gallops; no edema; no JVD  Respiratory: Clear to auscultation bilaterally  Gastrointestinal: soft, non-tender, non-distended with normal bowel sounds  Musculoskeletal: No clubbing; no joint deformity   Neurologic: Non-focal  Lymphatic: No lymphadenopathy  Psychiatry: AAOx3, mood & affect appropriate  Skin: No rashes, ecchymoses, or cyanosis                          11.3   18.24 )-----------( 226      ( 06 Jan 2024 07:16 )             33.3     01-06    132<L>  |  92<L>  |  44<H>  ----------------------------<  156<H>  4.4   |  24  |  1.12    Ca    9.8      06 Jan 2024 07:17    TPro  6.4  /  Alb  3.7  /  TBili  0.3  /  DBili  x   /  AST  32  /  ALT  63<H>  /  AlkPhos  53  01-06      New ECG(s): Personally reviewed

## 2024-01-06 NOTE — PROGRESS NOTE ADULT - ATTENDING COMMENTS
57 y/o M admitted with cardiogenic shock, now s/p OHT on 12/25/2023 (CMV -/+, Toxo -/-). RHC/EMB 1/2/2024 with elevated filling pressures. Continue Bumex 2mg BID. Has received intermittent hypertonic saline this week.   Continue Hydralazine and Nifedipine for BP control.  Continue immunosuppression with Tacrolimus, CellCept 1000mg BID, Prednisone taper. Continue Tacrolimus 6.5mg q12h for today. Continue prophylaxis with Bactrim, Valcyte, Fluconazole.  Monitor leukocytosis, off antibiotics. 59 y/o M admitted with cardiogenic shock, now s/p OHT on 12/25/2023 (CMV -/+, Toxo -/-). RHC/EMB 1/2/2024 with elevated filling pressures. Continue Bumex 2mg BID. Has received intermittent hypertonic saline this week.   Continue Hydralazine and Nifedipine for BP control.  Continue immunosuppression with Tacrolimus, CellCept 1000mg BID, Prednisone taper. Continue Tacrolimus 6.5mg q12h for today. Continue prophylaxis with Bactrim, Valcyte, Fluconazole.  Monitor leukocytosis, off antibiotics.

## 2024-01-06 NOTE — PROGRESS NOTE ADULT - PROBLEM SELECTOR PLAN 1
- s/p OHT on 12/25. Ischemic Time: 253min  - IABP removed 12/26  - continue with Procardia XL 60 mg BID and hydralazine 100 mg PO TID  - Cont Bumex 2 mg PO BID  - underwent RHC/EMBx on 1/2, Grade 0R  - next RHC/EMBx schedule for Tuesday 1/9  - Please keep primary Dr. Banuelos [phone] in the loop per family request. - s/p OHT on 12/25. Ischemic Time: 253min  - IABP removed 12/26  - continue with Procardia XL 60 mg BID and hydralazine 100 mg PO TID  - Cont Bumex 2 mg PO BID  - underwent RHC/EMBx on 1/2, Grade 0R  - next RHC/EMBx schedule for Tuesday 1/9  - Please keep primary Dr. Banuelos [phone] in the loop per family request.

## 2024-01-06 NOTE — PROGRESS NOTE ADULT - PROBLEM SELECTOR PLAN 1
- IABP removed 12/26  - Sabrina and Epi weaned off 12/26  - Procardia XL 60 mg BID  - Bumex BID  - 1st RHC/EMBx on Tuesday 1/2  - DC plan eventual home

## 2024-01-06 NOTE — PROGRESS NOTE ADULT - SUBJECTIVE AND OBJECTIVE BOX
LD VALENCIA  58y Male  MRN:[id number]    Patient is a 58y old  Male who presents with a chief complaint of NSTEMI (01 Nov 2023 20:29)    HPI:  57yo M w/ hx HTN, CAD w/ 1 stent in 2009, ICH (2008) presenting with abn ekg. Patient presented to [facility] where he was found to have STEMI, recommended to get cath however patient did not want to get it there so it left and came here.  Patient initially had cough, congestion, fever, was placed on antibiotics on Sunday.  Started feeling nauseous and had a presyncopal event after which he presented to ED last night.  Had chest pain as well.  Chest pain is midsternal.  Not currently having chest pain.  Received 4 aspirin 30 min pta. (01 Nov 2023 15:11)      Patient seen and evaluated at bedside. interval events noted    Interval HPI:   no acute events o/n     PAST MEDICAL & SURGICAL HISTORY:  HTN (hypertension)      CAD (coronary artery disease)  2009; stent      Intracranial hemorrhage  2008      Respiratory arrest  december 1st      Myocardial infarction, unspecified MI type, unspecified artery      History of coronary artery stent placement          REVIEW OF SYSTEMS:  as per hpi     VITALS:   Vital Signs Last 24 Hrs  T(C): 36.3 (06 Jan 2024 13:04), Max: 37 (06 Jan 2024 05:54)  T(F): 97.4 (06 Jan 2024 13:04), Max: 98.6 (06 Jan 2024 05:54)  HR: 90 (06 Jan 2024 17:35) (90 - 115)  BP: 114/75 (06 Jan 2024 17:35) (97/59 - 126/77)  BP(mean): 82 (06 Jan 2024 08:00) (82 - 93)  RR: 18 (06 Jan 2024 13:04) (18 - 18)  SpO2: 97% (06 Jan 2024 13:04) (96% - 97%)    Parameters below as of 06 Jan 2024 13:04  Patient On (Oxygen Delivery Method): room air         PHYSICAL EXAM:  GENERAL: NAD, comfortable.    HEAD:  Atraumatic, Normocephalic  EYES: EOMI, PERRLA, conjunctiva and sclera clear  NECK:  No JVD.    CHEST/LUNG: Clear to auscultation bilaterally; No wheeze    HEART: Regular rate and rhythm; No murmurs, rubs, or gallops  ABDOMEN: Soft, Nontender, Nondistended; Bowel sounds present  EXTREMITIES:  2+ Peripheral Pulses, No clubbing, cyanosis, or edema  NEUROLOGY: a0x3          Consultant(s) Notes Reviewed:  [x ] YES  [ ] NO  Care Discussed with Consultants/Other Providers [ x] YES  [ ] NO    MEDS:   MEDICATIONS  (STANDING):  acetaminophen   IVPB .. 1000 milliGRAM(s) IV Intermittent once  budesonide  80 MICROgram(s)/formoterol 4.5 MICROgram(s) Inhaler 1 Puff(s) Inhalation two times a day  buMETAnide 2 milliGRAM(s) Oral every 12 hours  chlorhexidine 4% Liquid 1 Application(s) Topical <User Schedule>  dextrose 5%. 1000 milliLiter(s) (100 mL/Hr) IV Continuous <Continuous>  dextrose 5%. 1000 milliLiter(s) (50 mL/Hr) IV Continuous <Continuous>  dextrose 50% Injectable 50 milliLiter(s) IV Push every 15 minutes  fluconAZOLE   Tablet 100 milliGRAM(s) Oral <User Schedule>  glucagon  Injectable 1 milliGRAM(s) IntraMuscular once  heparin   Injectable 5000 Unit(s) SubCutaneous every 8 hours  hydrALAZINE 100 milliGRAM(s) Oral every 8 hours  insulin glargine Injectable (LANTUS) 33 Unit(s) SubCutaneous at bedtime  insulin lispro (ADMELOG) corrective regimen sliding scale   SubCutaneous <User Schedule>  insulin lispro (ADMELOG) corrective regimen sliding scale   SubCutaneous three times a day before meals  insulin lispro Injectable (ADMELOG) 20 Unit(s) SubCutaneous before breakfast  insulin lispro Injectable (ADMELOG) 17 Unit(s) SubCutaneous before dinner  insulin lispro Injectable (ADMELOG) 17 Unit(s) SubCutaneous before lunch  insulin regular Infusion 3 Unit(s)/Hr (3 mL/Hr) IV Continuous <Continuous>  lidocaine   4% Patch 3 Patch Transdermal daily  magnesium oxide 400 milliGRAM(s) Oral three times a day with meals  melatonin 5 milliGRAM(s) Oral at bedtime  mycophenolate mofetil 1000 milliGRAM(s) Oral every 12 hours  NIFEdipine XL 60 milliGRAM(s) Oral every 12 hours  OLANZapine 5 milliGRAM(s) Oral at bedtime  pantoprazole    Tablet 40 milliGRAM(s) Oral every 24 hours  polyethylene glycol 3350 17 Gram(s) Oral daily  predniSONE   Tablet 15 milliGRAM(s) Oral every 12 hours  senna 2 Tablet(s) Oral at bedtime  tacrolimus 6.5 milliGRAM(s) Oral <User Schedule>  trimethoprim   80 mG/sulfamethoxazole 400 mG 1 Tablet(s) Oral daily  valGANciclovir 450 milliGRAM(s) Oral every 12 hours    MEDICATIONS  (PRN):  acetaminophen     Tablet .. 650 milliGRAM(s) Oral every 6 hours PRN Mild pain  dextrose Oral Gel 15 Gram(s) Oral once PRN Blood Glucose LESS THAN 70 milliGRAM(s)/deciliter      Allergies    penicillins (Unknown)    Intolerances        LABS:                         11.3   18.24 )-----------( 226      ( 06 Jan 2024 07:16 )             33.3   01-06    132<L>  |  92<L>  |  44<H>  ----------------------------<  156<H>  4.4   |  24  |  1.12    Ca    9.8      06 Jan 2024 07:17    TPro  6.4  /  Alb  3.7  /  TBili  0.3  /  DBili  x   /  AST  32  /  ALT  63<H>  /  AlkPhos  53  01-06      < from: CT Chest No Cont (01.05.24 @ 09:25) >  IMPRESSION:  Question mild stercoral colitis.  Expected postoperative changes in the chest. No fluid collection.        --- End of Report ---    < end of copied text >      < from: CT Abdomen and Pelvis No Cont (11.28.23 @ 03:38) >  IMPRESSION:  Mildly dilated colon and prominent but not overly dilated small bowel   with air-fluid levels. No discrete transition point. Findings are   suggestive of ileus.    Intra-aortic balloon pump with the inferior marker at the level of the   inferior mesenteric artery and the balloon overlying the origins of the   renal arteries, celiac artery, and superior mesenteric artery. Consider   repositioning. Concern for IABP positioning was discussed with LANETTE Hawthorne   on 11/28/2023 at 3:42 AM by Dr. Shepard.    < end of copied text >          < from: Colonoscopy (11.17.23 @ 10:35) >                                                                                                        Impression:          - The entire examined colon is normal on direct and retroflexion views.                       - No specimens collected.  Recommendation:      - Resume previous diet today.                       - No large polyps or masses detected, No objection from GI standpoint to                 proceed with heart transplantation/advanced heart therapies.                       - Please call back should any further questions or concerns arise.    < end of copied text >     < from: Xray Chest 1 View- PORTABLE-Urgent (11.01.23 @ 07:42) >    IMPRESSION:  Clear lungs.    ---End of Report ---        < end of copied text >  < from: TTE W or WO Ultrasound Enhancing Agent (11.01.23 @ 10:23) >  _____________________________     CONCLUSIONS:      1. Left ventricular cavity is moderately dilated. Left ventricular wall thickness is normal. Left ventricular systolic function is severely decreased with an ejection fraction of 32 % by Chinchilla's method of disks. Regional wall motion abnormalities present.   2. Multiple segmental abnormalities exist. See findings.   3. There is moderate (grade 2) left ventricular diastolic dysfunction, with indeterminant filling pressure.   4. Normal right ventricular cavity size, wall thickness, and systolic function.   5. No significant valvular disease.   6. No pericardial effusion seen.   7. Compared to the transthoracic echocardiogram performed on 1/25/2017 the areas of akinesis are unchanged but there has been a decline in LV systolic function with new areas of hypokinesis.    __________________________________________________________________    < end of copied text >  < from: TTE Limited W or WO Ultrasound Enhancing Agent (11.02.23 @ 07:41) >  __________________________     CONCLUSIONS:      1. After obtaining consent, Definity ultrasound enhancing agent was given for enhanced left ventricular opacification and improved delineation of the left ventricular endocardial borders. Left ventricular systolic function is severely decreased with a calculated ejection fraction of 22 % by the Chinchilla's biplane method of disks. There is a left ventricular thrombus.   2. Findings were discussed with Litzy BOSS on 11/2/2023 at 8.49am.   3. There is a left ventricular thrombus.    _________________________________________________________________    < end of copied text >   LD VALENCIA  58y Male  MRN:[id number]    Patient is a 58y old  Male who presents with a chief complaint of NSTEMI (01 Nov 2023 20:29)    HPI:  57yo M w/ hx HTN, CAD w/ 1 stent in 2009, ICH (2008) presenting with abn ekg. Patient presented to [facility] where he was found to have STEMI, recommended to get cath however patient did not want to get it there so it left and came here.  Patient initially had cough, congestion, fever, was placed on antibiotics on Sunday.  Started feeling nauseous and had a presyncopal event after which he presented to ED last night.  Had chest pain as well.  Chest pain is midsternal.  Not currently having chest pain.  Received 4 aspirin 30 min pta. (01 Nov 2023 15:11)      Patient seen and evaluated at bedside. interval events noted    Interval HPI:   no acute events o/n     PAST MEDICAL & SURGICAL HISTORY:  HTN (hypertension)      CAD (coronary artery disease)  2009; stent      Intracranial hemorrhage  2008      Respiratory arrest  december 1st      Myocardial infarction, unspecified MI type, unspecified artery      History of coronary artery stent placement          REVIEW OF SYSTEMS:  as per hpi     VITALS:   Vital Signs Last 24 Hrs  T(C): 36.3 (06 Jan 2024 13:04), Max: 37 (06 Jan 2024 05:54)  T(F): 97.4 (06 Jan 2024 13:04), Max: 98.6 (06 Jan 2024 05:54)  HR: 90 (06 Jan 2024 17:35) (90 - 115)  BP: 114/75 (06 Jan 2024 17:35) (97/59 - 126/77)  BP(mean): 82 (06 Jan 2024 08:00) (82 - 93)  RR: 18 (06 Jan 2024 13:04) (18 - 18)  SpO2: 97% (06 Jan 2024 13:04) (96% - 97%)    Parameters below as of 06 Jan 2024 13:04  Patient On (Oxygen Delivery Method): room air         PHYSICAL EXAM:  GENERAL: NAD, comfortable.    HEAD:  Atraumatic, Normocephalic  EYES: EOMI, PERRLA, conjunctiva and sclera clear  NECK:  No JVD.    CHEST/LUNG: Clear to auscultation bilaterally; No wheeze    HEART: Regular rate and rhythm; No murmurs, rubs, or gallops  ABDOMEN: Soft, Nontender, Nondistended; Bowel sounds present  EXTREMITIES:  2+ Peripheral Pulses, No clubbing, cyanosis, or edema  NEUROLOGY: a0x3          Consultant(s) Notes Reviewed:  [x ] YES  [ ] NO  Care Discussed with Consultants/Other Providers [ x] YES  [ ] NO    MEDS:   MEDICATIONS  (STANDING):  acetaminophen   IVPB .. 1000 milliGRAM(s) IV Intermittent once  budesonide  80 MICROgram(s)/formoterol 4.5 MICROgram(s) Inhaler 1 Puff(s) Inhalation two times a day  buMETAnide 2 milliGRAM(s) Oral every 12 hours  chlorhexidine 4% Liquid 1 Application(s) Topical <User Schedule>  dextrose 5%. 1000 milliLiter(s) (100 mL/Hr) IV Continuous <Continuous>  dextrose 5%. 1000 milliLiter(s) (50 mL/Hr) IV Continuous <Continuous>  dextrose 50% Injectable 50 milliLiter(s) IV Push every 15 minutes  fluconAZOLE   Tablet 100 milliGRAM(s) Oral <User Schedule>  glucagon  Injectable 1 milliGRAM(s) IntraMuscular once  heparin   Injectable 5000 Unit(s) SubCutaneous every 8 hours  hydrALAZINE 100 milliGRAM(s) Oral every 8 hours  insulin glargine Injectable (LANTUS) 33 Unit(s) SubCutaneous at bedtime  insulin lispro (ADMELOG) corrective regimen sliding scale   SubCutaneous <User Schedule>  insulin lispro (ADMELOG) corrective regimen sliding scale   SubCutaneous three times a day before meals  insulin lispro Injectable (ADMELOG) 20 Unit(s) SubCutaneous before breakfast  insulin lispro Injectable (ADMELOG) 17 Unit(s) SubCutaneous before dinner  insulin lispro Injectable (ADMELOG) 17 Unit(s) SubCutaneous before lunch  insulin regular Infusion 3 Unit(s)/Hr (3 mL/Hr) IV Continuous <Continuous>  lidocaine   4% Patch 3 Patch Transdermal daily  magnesium oxide 400 milliGRAM(s) Oral three times a day with meals  melatonin 5 milliGRAM(s) Oral at bedtime  mycophenolate mofetil 1000 milliGRAM(s) Oral every 12 hours  NIFEdipine XL 60 milliGRAM(s) Oral every 12 hours  OLANZapine 5 milliGRAM(s) Oral at bedtime  pantoprazole    Tablet 40 milliGRAM(s) Oral every 24 hours  polyethylene glycol 3350 17 Gram(s) Oral daily  predniSONE   Tablet 15 milliGRAM(s) Oral every 12 hours  senna 2 Tablet(s) Oral at bedtime  tacrolimus 6.5 milliGRAM(s) Oral <User Schedule>  trimethoprim   80 mG/sulfamethoxazole 400 mG 1 Tablet(s) Oral daily  valGANciclovir 450 milliGRAM(s) Oral every 12 hours    MEDICATIONS  (PRN):  acetaminophen     Tablet .. 650 milliGRAM(s) Oral every 6 hours PRN Mild pain  dextrose Oral Gel 15 Gram(s) Oral once PRN Blood Glucose LESS THAN 70 milliGRAM(s)/deciliter      Allergies    penicillins (Unknown)    Intolerances        LABS:                         11.3   18.24 )-----------( 226      ( 06 Jan 2024 07:16 )             33.3   01-06    132<L>  |  92<L>  |  44<H>  ----------------------------<  156<H>  4.4   |  24  |  1.12    Ca    9.8      06 Jan 2024 07:17    TPro  6.4  /  Alb  3.7  /  TBili  0.3  /  DBili  x   /  AST  32  /  ALT  63<H>  /  AlkPhos  53  01-06      < from: CT Chest No Cont (01.05.24 @ 09:25) >  IMPRESSION:  Question mild stercoral colitis.  Expected postoperative changes in the chest. No fluid collection.        --- End of Report ---    < end of copied text >      < from: CT Abdomen and Pelvis No Cont (11.28.23 @ 03:38) >  IMPRESSION:  Mildly dilated colon and prominent but not overly dilated small bowel   with air-fluid levels. No discrete transition point. Findings are   suggestive of ileus.    Intra-aortic balloon pump with the inferior marker at the level of the   inferior mesenteric artery and the balloon overlying the origins of the   renal arteries, celiac artery, and superior mesenteric artery. Consider   repositioning. Concern for IABP positioning was discussed with LANETTE Hawthorne   on 11/28/2023 at 3:42 AM by Dr. Shepard.    < end of copied text >          < from: Colonoscopy (11.17.23 @ 10:35) >                                                                                                        Impression:          - The entire examined colon is normal on direct and retroflexion views.                       - No specimens collected.  Recommendation:      - Resume previous diet today.                       - No large polyps or masses detected, No objection from GI standpoint to                 proceed with heart transplantation/advanced heart therapies.                       - Please call back should any further questions or concerns arise.    < end of copied text >     < from: Xray Chest 1 View- PORTABLE-Urgent (11.01.23 @ 07:42) >    IMPRESSION:  Clear lungs.    ---End of Report ---        < end of copied text >  < from: TTE W or WO Ultrasound Enhancing Agent (11.01.23 @ 10:23) >  _____________________________     CONCLUSIONS:      1. Left ventricular cavity is moderately dilated. Left ventricular wall thickness is normal. Left ventricular systolic function is severely decreased with an ejection fraction of 32 % by Chinchilla's method of disks. Regional wall motion abnormalities present.   2. Multiple segmental abnormalities exist. See findings.   3. There is moderate (grade 2) left ventricular diastolic dysfunction, with indeterminant filling pressure.   4. Normal right ventricular cavity size, wall thickness, and systolic function.   5. No significant valvular disease.   6. No pericardial effusion seen.   7. Compared to the transthoracic echocardiogram performed on 1/25/2017 the areas of akinesis are unchanged but there has been a decline in LV systolic function with new areas of hypokinesis.    __________________________________________________________________    < end of copied text >  < from: TTE Limited W or WO Ultrasound Enhancing Agent (11.02.23 @ 07:41) >  __________________________     CONCLUSIONS:      1. After obtaining consent, Definity ultrasound enhancing agent was given for enhanced left ventricular opacification and improved delineation of the left ventricular endocardial borders. Left ventricular systolic function is severely decreased with a calculated ejection fraction of 22 % by the Chinchilla's biplane method of disks. There is a left ventricular thrombus.   2. Findings were discussed with Litzy BOSS on 11/2/2023 at 8.49am.   3. There is a left ventricular thrombus.    _________________________________________________________________    < end of copied text >

## 2024-01-06 NOTE — PROGRESS NOTE ADULT - PROBLEM SELECTOR PLAN 3
·  Plan: - pretransplant was noted to have positive BCx staph epi, Enterococcus faecalis and Cutibacterium   - post transplant has been afebrile.  - BC 1/3 NGTD  - wbc 18--- UA ordered--   -AXR ordered

## 2024-01-06 NOTE — PROGRESS NOTE ADULT - ASSESSMENT
57 yo M with HFrEF (LVIDd 6.4 cm, LVEF 32%), CAD s/p PCI (2008), HTN, DMT2 (A1c 8.3%) and CVA s/p TPA (2018), recently treated for PNA who initially presented to [facility] via EMS after syncope reportedly requiring defibrillation. Treated for ACS but left AMA to come to [facility]. On arrival ECG with ST depression in lateral leads. Intubated 11/1 for respiratory failure and was found to have COVID. L/RHC 11/1revealing  of LAD and RCA, elevated filling pressures and CI of 1.5 prompting placement of IABP. Extubated 11/3. IABP was weaned to off 11/7, however the following day developed PMVT cardiac arrest with prompt CPR and defibrillation, started on IV Lidocaine/Amio and IABP ultimately replaced on 11/9. During this admission was found to be bacteremic for which she was treated for Staph epi, Enterococcus faecalis, Cutibacterium with IV abx.  Was listed Status 2, ABO A, PRA 0% (12/12).     A suitable donor was identified and on 12/25, he underwent OHT (ischemic Time: 253min, CMV -/+, Toxo -/-). The following day, extubated and IABP removed. Overall progressing well. He underwent RHC/EMBx with revealed elevated filling pressures and normal cardiac output. His diuretics have been increased and he is responding well. He continues to have elevated WBC however remains afebrile and off antibiotics. Currently undergoing infectious work up. Next RHC/EMBx schedule Tuesday 1/9 59 yo M with HFrEF (LVIDd 6.4 cm, LVEF 32%), CAD s/p PCI (2008), HTN, DMT2 (A1c 8.3%) and CVA s/p TPA (2018), recently treated for PNA who initially presented to [facility] via EMS after syncope reportedly requiring defibrillation. Treated for ACS but left AMA to come to [facility]. On arrival ECG with ST depression in lateral leads. Intubated 11/1 for respiratory failure and was found to have COVID. L/RHC 11/1revealing  of LAD and RCA, elevated filling pressures and CI of 1.5 prompting placement of IABP. Extubated 11/3. IABP was weaned to off 11/7, however the following day developed PMVT cardiac arrest with prompt CPR and defibrillation, started on IV Lidocaine/Amio and IABP ultimately replaced on 11/9. During this admission was found to be bacteremic for which she was treated for Staph epi, Enterococcus faecalis, Cutibacterium with IV abx.  Was listed Status 2, ABO A, PRA 0% (12/12).     A suitable donor was identified and on 12/25, he underwent OHT (ischemic Time: 253min, CMV -/+, Toxo -/-). The following day, extubated and IABP removed. Overall progressing well. He underwent RHC/EMBx with revealed elevated filling pressures and normal cardiac output. His diuretics have been increased and he is responding well. He continues to have elevated WBC however remains afebrile and off antibiotics. Currently undergoing infectious work up. Next RHC/EMBx schedule Tuesday 1/9

## 2024-01-06 NOTE — PROGRESS NOTE ADULT - ASSESSMENT
58M : HFrEF (LVIDd 6.4 cm, LVEF 32%), PCI ('08), HTN, DMT2 (A1c 8.3%) and CVA s/p TPA ('18), recently treated for PNA who initially presented to [facility] via EMS after syncope reportedly requiring defibrillation. Treated for ACS but left AMA to come to [facility]. On arrival ECG with ST depression in lateral leads. Intubated 11/1 for respiratory failure and was found to have COVID. L/RHC 11/1revealing  of LAD and RCA, elevated filling pressures and CI of 1.5 prompting placement of IABP. Extubated 11/3. IABP was weaned to off 11/7, however the following day developed PMVT cardiac arrest with prompt CPR and defibrillation, started on IV Lidocaine/Amio and IABP ultimately replaced on 11/9. During this admission was found to be bacteremic for which she was treated for Staph epi, Enterococcus faecalis, Cutibacterium with IV abx.  Was listed Status 2, ABO A, PRA 0% (12/12).     A suitable donor was identified and on 12/25, he underwent OHT (ischemic Time: 253min, CMV -/+, Toxo -/-). The following day, extubated and IABP removed. Milrinone was being gradually weaned but when turned off yesterday, despite adequate perfusion indicies, had rise in lactate for which inotropic support was resumed. Currently appears well supported and compensated. Will again trial wean off inotrope. Will also adjust oral antihypertensive regimen with goal to wean off Cardene gtt. Plan for 1st EMBX next week on 1/2.     On 10/25/23, pt underwent OHT  - IABP removed 12/26  - Sabrina and Epi weaned off 12/26  - Milrinone off  - Procardia XL 60 mg BID  - Bumex TID  - 1st RHC/EMBx on Tuesday 1/2  - Transfer to Stepdown 12/31  -  Right femerol rangel in place   - Med CT x 3 - LWS      insulin gtt d/t hyperglycemia - house endo following   - d/c planning - aniticipate home   1/1/24 - VSS - pt. c/o uncomfortable bed last evening - stating he would sign out AMA if he was not given a more comfortable bed.  staff obliged & a new bed was brought in.  pt requesting new urinal with each void as he "is at risk for an infection"  Ptt properly educated on post transplant risks.  states, "A doctor told me I can only use a urinal once to prevent infection"  transplant team to reinforce post-transplant precautions. MED CT x 3 in place  ?d/c femerol rangel today - will rounds with transplant team  1/2/24 VSS - NPO for cardiac bx -rhc this am-  pt refused blood draw this am despite provider discussing the importance of blood levels post transplant.  Pt acquiesced - allowed blood draw @ 7:30a-d/c planning -   1/3 s/p biopsy and rhc yesterday Ms ct x 1 remains in place.  Glucose improved, currentyly off insulin infusion  1/4/23   F/u cultures, rvp neg Glucose stable overnight MS ct remains in place  1/5  VSS  afebrile Na+ 133.  Wbc 16.  Ct c/a/p results pnd.  RVP neg.  BC-ngtd.  -200cc/24.  BS 99.  Tacro 8.2  1/6 VSS  afebrile  Na 132 Wbc 18.2.  UA ordered.   CT a/p/c mild stercoral colitis.  -1.7L  BC NG 48 hrs.  Tacro 10.6 58M : HFrEF (LVIDd 6.4 cm, LVEF 32%), PCI ('08), HTN, DMT2 (A1c 8.3%) and CVA s/p TPA ('18), recently treated for PNA who initially presented to [facility] via EMS after syncope reportedly requiring defibrillation. Treated for ACS but left AMA to come to [facility]. On arrival ECG with ST depression in lateral leads. Intubated 11/1 for respiratory failure and was found to have COVID. L/RHC 11/1revealing  of LAD and RCA, elevated filling pressures and CI of 1.5 prompting placement of IABP. Extubated 11/3. IABP was weaned to off 11/7, however the following day developed PMVT cardiac arrest with prompt CPR and defibrillation, started on IV Lidocaine/Amio and IABP ultimately replaced on 11/9. During this admission was found to be bacteremic for which she was treated for Staph epi, Enterococcus faecalis, Cutibacterium with IV abx.  Was listed Status 2, ABO A, PRA 0% (12/12).     A suitable donor was identified and on 12/25, he underwent OHT (ischemic Time: 253min, CMV -/+, Toxo -/-). The following day, extubated and IABP removed. Milrinone was being gradually weaned but when turned off yesterday, despite adequate perfusion indicies, had rise in lactate for which inotropic support was resumed. Currently appears well supported and compensated. Will again trial wean off inotrope. Will also adjust oral antihypertensive regimen with goal to wean off Cardene gtt. Plan for 1st EMBX next week on 1/2.     On 10/25/23, pt underwent OHT  - IABP removed 12/26  - Sabrina and Epi weaned off 12/26  - Milrinone off  - Procardia XL 60 mg BID  - Bumex TID  - 1st RHC/EMBx on Tuesday 1/2  - Transfer to Stepdown 12/31  -  Right femerol rangel in place   - Med CT x 3 - LWS      insulin gtt d/t hyperglycemia - house endo following   - d/c planning - aniticipate home   1/1/24 - VSS - pt. c/o uncomfortable bed last evening - stating he would sign out AMA if he was not given a more comfortable bed.  staff obliged & a new bed was brought in.  pt requesting new urinal with each void as he "is at risk for an infection"  Ptt properly educated on post transplant risks.  states, "A doctor told me I can only use a urinal once to prevent infection"  transplant team to reinforce post-transplant precautions. MED CT x 3 in place  ?d/c femerol rangel today - will rounds with transplant team  1/2/24 VSS - NPO for cardiac bx -rhc this am-  pt refused blood draw this am despite provider discussing the importance of blood levels post transplant.  Pt acquiesced - allowed blood draw @ 7:30a-d/c planning -   1/3 s/p biopsy and rhc yesterday Ms ct x 1 remains in place.  Glucose improved, currentyly off insulin infusion  1/4/23   F/u cultures, rvp neg Glucose stable overnight MS ct remains in place  1/5  VSS  afebrile Na+ 133.  Wbc 16.  Ct c/a/p results pnd.  RVP neg.  BC-ngtd.  -200cc/24.  BS 99.  Tacro 8.2  1/6 VSS  afebrile  Na 132 Wbc 18.2.  UA ordered.   CT a/p/c mild stercoral colitis.  -1.7L  BC NG 48 hrs.  Tacro 10.6

## 2024-01-07 LAB
ALBUMIN SERPL ELPH-MCNC: 3.5 G/DL — SIGNIFICANT CHANGE UP (ref 3.3–5)
ALBUMIN SERPL ELPH-MCNC: 3.5 G/DL — SIGNIFICANT CHANGE UP (ref 3.3–5)
ALP SERPL-CCNC: 57 U/L — SIGNIFICANT CHANGE UP (ref 40–120)
ALP SERPL-CCNC: 57 U/L — SIGNIFICANT CHANGE UP (ref 40–120)
ALT FLD-CCNC: 55 U/L — HIGH (ref 10–45)
ALT FLD-CCNC: 55 U/L — HIGH (ref 10–45)
ANION GAP SERPL CALC-SCNC: 11 MMOL/L — SIGNIFICANT CHANGE UP (ref 5–17)
ANION GAP SERPL CALC-SCNC: 11 MMOL/L — SIGNIFICANT CHANGE UP (ref 5–17)
ANION GAP SERPL CALC-SCNC: 15 MMOL/L — SIGNIFICANT CHANGE UP (ref 5–17)
ANION GAP SERPL CALC-SCNC: 15 MMOL/L — SIGNIFICANT CHANGE UP (ref 5–17)
AST SERPL-CCNC: 23 U/L — SIGNIFICANT CHANGE UP (ref 10–40)
AST SERPL-CCNC: 23 U/L — SIGNIFICANT CHANGE UP (ref 10–40)
BILIRUB SERPL-MCNC: 0.2 MG/DL — SIGNIFICANT CHANGE UP (ref 0.2–1.2)
BILIRUB SERPL-MCNC: 0.2 MG/DL — SIGNIFICANT CHANGE UP (ref 0.2–1.2)
BUN SERPL-MCNC: 62 MG/DL — HIGH (ref 7–23)
BUN SERPL-MCNC: 62 MG/DL — HIGH (ref 7–23)
BUN SERPL-MCNC: 63 MG/DL — HIGH (ref 7–23)
BUN SERPL-MCNC: 63 MG/DL — HIGH (ref 7–23)
CALCIUM SERPL-MCNC: 10.2 MG/DL — SIGNIFICANT CHANGE UP (ref 8.4–10.5)
CALCIUM SERPL-MCNC: 10.2 MG/DL — SIGNIFICANT CHANGE UP (ref 8.4–10.5)
CALCIUM SERPL-MCNC: 9.7 MG/DL — SIGNIFICANT CHANGE UP (ref 8.4–10.5)
CALCIUM SERPL-MCNC: 9.7 MG/DL — SIGNIFICANT CHANGE UP (ref 8.4–10.5)
CHLORIDE SERPL-SCNC: 90 MMOL/L — LOW (ref 96–108)
CHLORIDE SERPL-SCNC: 90 MMOL/L — LOW (ref 96–108)
CHLORIDE SERPL-SCNC: 93 MMOL/L — LOW (ref 96–108)
CHLORIDE SERPL-SCNC: 93 MMOL/L — LOW (ref 96–108)
CO2 SERPL-SCNC: 24 MMOL/L — SIGNIFICANT CHANGE UP (ref 22–31)
CO2 SERPL-SCNC: 24 MMOL/L — SIGNIFICANT CHANGE UP (ref 22–31)
CO2 SERPL-SCNC: 26 MMOL/L — SIGNIFICANT CHANGE UP (ref 22–31)
CO2 SERPL-SCNC: 26 MMOL/L — SIGNIFICANT CHANGE UP (ref 22–31)
CREAT SERPL-MCNC: 1.46 MG/DL — HIGH (ref 0.5–1.3)
CREAT SERPL-MCNC: 1.46 MG/DL — HIGH (ref 0.5–1.3)
CREAT SERPL-MCNC: 1.59 MG/DL — HIGH (ref 0.5–1.3)
CREAT SERPL-MCNC: 1.59 MG/DL — HIGH (ref 0.5–1.3)
EGFR: 50 ML/MIN/1.73M2 — LOW
EGFR: 50 ML/MIN/1.73M2 — LOW
EGFR: 55 ML/MIN/1.73M2 — LOW
EGFR: 55 ML/MIN/1.73M2 — LOW
GLUCOSE BLDC GLUCOMTR-MCNC: 154 MG/DL — HIGH (ref 70–99)
GLUCOSE BLDC GLUCOMTR-MCNC: 154 MG/DL — HIGH (ref 70–99)
GLUCOSE BLDC GLUCOMTR-MCNC: 178 MG/DL — HIGH (ref 70–99)
GLUCOSE BLDC GLUCOMTR-MCNC: 178 MG/DL — HIGH (ref 70–99)
GLUCOSE BLDC GLUCOMTR-MCNC: 281 MG/DL — HIGH (ref 70–99)
GLUCOSE BLDC GLUCOMTR-MCNC: 281 MG/DL — HIGH (ref 70–99)
GLUCOSE BLDC GLUCOMTR-MCNC: 83 MG/DL — SIGNIFICANT CHANGE UP (ref 70–99)
GLUCOSE BLDC GLUCOMTR-MCNC: 83 MG/DL — SIGNIFICANT CHANGE UP (ref 70–99)
GLUCOSE BLDC GLUCOMTR-MCNC: 94 MG/DL — SIGNIFICANT CHANGE UP (ref 70–99)
GLUCOSE BLDC GLUCOMTR-MCNC: 94 MG/DL — SIGNIFICANT CHANGE UP (ref 70–99)
GLUCOSE SERPL-MCNC: 228 MG/DL — HIGH (ref 70–99)
GLUCOSE SERPL-MCNC: 228 MG/DL — HIGH (ref 70–99)
GLUCOSE SERPL-MCNC: 96 MG/DL — SIGNIFICANT CHANGE UP (ref 70–99)
GLUCOSE SERPL-MCNC: 96 MG/DL — SIGNIFICANT CHANGE UP (ref 70–99)
HCT VFR BLD CALC: 32.7 % — LOW (ref 39–50)
HCT VFR BLD CALC: 32.7 % — LOW (ref 39–50)
HGB BLD-MCNC: 10.9 G/DL — LOW (ref 13–17)
HGB BLD-MCNC: 10.9 G/DL — LOW (ref 13–17)
MCHC RBC-ENTMCNC: 29.7 PG — SIGNIFICANT CHANGE UP (ref 27–34)
MCHC RBC-ENTMCNC: 29.7 PG — SIGNIFICANT CHANGE UP (ref 27–34)
MCHC RBC-ENTMCNC: 33.3 GM/DL — SIGNIFICANT CHANGE UP (ref 32–36)
MCHC RBC-ENTMCNC: 33.3 GM/DL — SIGNIFICANT CHANGE UP (ref 32–36)
MCV RBC AUTO: 89.1 FL — SIGNIFICANT CHANGE UP (ref 80–100)
MCV RBC AUTO: 89.1 FL — SIGNIFICANT CHANGE UP (ref 80–100)
NRBC # BLD: 0 /100 WBCS — SIGNIFICANT CHANGE UP (ref 0–0)
NRBC # BLD: 0 /100 WBCS — SIGNIFICANT CHANGE UP (ref 0–0)
PLATELET # BLD AUTO: 227 K/UL — SIGNIFICANT CHANGE UP (ref 150–400)
PLATELET # BLD AUTO: 227 K/UL — SIGNIFICANT CHANGE UP (ref 150–400)
POTASSIUM SERPL-MCNC: 3.9 MMOL/L — SIGNIFICANT CHANGE UP (ref 3.5–5.3)
POTASSIUM SERPL-MCNC: 3.9 MMOL/L — SIGNIFICANT CHANGE UP (ref 3.5–5.3)
POTASSIUM SERPL-MCNC: 5 MMOL/L — SIGNIFICANT CHANGE UP (ref 3.5–5.3)
POTASSIUM SERPL-MCNC: 5 MMOL/L — SIGNIFICANT CHANGE UP (ref 3.5–5.3)
POTASSIUM SERPL-SCNC: 3.9 MMOL/L — SIGNIFICANT CHANGE UP (ref 3.5–5.3)
POTASSIUM SERPL-SCNC: 3.9 MMOL/L — SIGNIFICANT CHANGE UP (ref 3.5–5.3)
POTASSIUM SERPL-SCNC: 5 MMOL/L — SIGNIFICANT CHANGE UP (ref 3.5–5.3)
POTASSIUM SERPL-SCNC: 5 MMOL/L — SIGNIFICANT CHANGE UP (ref 3.5–5.3)
PROT SERPL-MCNC: 6.1 G/DL — SIGNIFICANT CHANGE UP (ref 6–8.3)
PROT SERPL-MCNC: 6.1 G/DL — SIGNIFICANT CHANGE UP (ref 6–8.3)
RBC # BLD: 3.67 M/UL — LOW (ref 4.2–5.8)
RBC # BLD: 3.67 M/UL — LOW (ref 4.2–5.8)
RBC # FLD: 16 % — HIGH (ref 10.3–14.5)
RBC # FLD: 16 % — HIGH (ref 10.3–14.5)
SODIUM SERPL-SCNC: 125 MMOL/L — LOW (ref 135–145)
SODIUM SERPL-SCNC: 125 MMOL/L — LOW (ref 135–145)
SODIUM SERPL-SCNC: 134 MMOL/L — LOW (ref 135–145)
SODIUM SERPL-SCNC: 134 MMOL/L — LOW (ref 135–145)
TACROLIMUS SERPL-MCNC: 12.2 NG/ML — SIGNIFICANT CHANGE UP
TACROLIMUS SERPL-MCNC: 12.2 NG/ML — SIGNIFICANT CHANGE UP
WBC # BLD: 14.17 K/UL — HIGH (ref 3.8–10.5)
WBC # BLD: 14.17 K/UL — HIGH (ref 3.8–10.5)
WBC # FLD AUTO: 14.17 K/UL — HIGH (ref 3.8–10.5)
WBC # FLD AUTO: 14.17 K/UL — HIGH (ref 3.8–10.5)

## 2024-01-07 PROCEDURE — 99234 HOSP IP/OBS SM DT SF/LOW 45: CPT

## 2024-01-07 PROCEDURE — 74018 RADEX ABDOMEN 1 VIEW: CPT | Mod: 26

## 2024-01-07 PROCEDURE — 99232 SBSQ HOSP IP/OBS MODERATE 35: CPT

## 2024-01-07 RX ORDER — SOD SULF/SODIUM/NAHCO3/KCL/PEG
4000 SOLUTION, RECONSTITUTED, ORAL ORAL ONCE
Refills: 0 | Status: COMPLETED | OUTPATIENT
Start: 2024-01-07 | End: 2024-01-07

## 2024-01-07 RX ORDER — ALBUMIN HUMAN 25 %
100 VIAL (ML) INTRAVENOUS ONCE
Refills: 0 | Status: COMPLETED | OUTPATIENT
Start: 2024-01-07 | End: 2024-01-07

## 2024-01-07 RX ORDER — ACETAMINOPHEN 500 MG
650 TABLET ORAL ONCE
Refills: 0 | Status: COMPLETED | OUTPATIENT
Start: 2024-01-07 | End: 2024-01-07

## 2024-01-07 RX ORDER — SOD SULF/SODIUM/NAHCO3/KCL/PEG
1000 SOLUTION, RECONSTITUTED, ORAL ORAL ONCE
Refills: 0 | Status: COMPLETED | OUTPATIENT
Start: 2024-01-07 | End: 2024-01-07

## 2024-01-07 RX ADMIN — PANTOPRAZOLE SODIUM 40 MILLIGRAM(S): 20 TABLET, DELAYED RELEASE ORAL at 08:04

## 2024-01-07 RX ADMIN — Medication 15 MILLIGRAM(S): at 20:19

## 2024-01-07 RX ADMIN — TACROLIMUS 6.5 MILLIGRAM(S): 5 CAPSULE ORAL at 08:02

## 2024-01-07 RX ADMIN — CHLORHEXIDINE GLUCONATE 1 APPLICATION(S): 213 SOLUTION TOPICAL at 06:07

## 2024-01-07 RX ADMIN — MYCOPHENOLATE MOFETIL 1000 MILLIGRAM(S): 250 CAPSULE ORAL at 08:03

## 2024-01-07 RX ADMIN — MAGNESIUM OXIDE 400 MG ORAL TABLET 400 MILLIGRAM(S): 241.3 TABLET ORAL at 13:38

## 2024-01-07 RX ADMIN — Medication 15 MILLIGRAM(S): at 08:03

## 2024-01-07 RX ADMIN — HEPARIN SODIUM 5000 UNIT(S): 5000 INJECTION INTRAVENOUS; SUBCUTANEOUS at 06:21

## 2024-01-07 RX ADMIN — MAGNESIUM OXIDE 400 MG ORAL TABLET 400 MILLIGRAM(S): 241.3 TABLET ORAL at 20:42

## 2024-01-07 RX ADMIN — Medication 1 TABLET(S): at 13:38

## 2024-01-07 RX ADMIN — TACROLIMUS 6.5 MILLIGRAM(S): 5 CAPSULE ORAL at 20:19

## 2024-01-07 RX ADMIN — Medication 60 MILLIGRAM(S): at 17:42

## 2024-01-07 RX ADMIN — Medication 100 MILLIGRAM(S): at 06:21

## 2024-01-07 RX ADMIN — MYCOPHENOLATE MOFETIL 1000 MILLIGRAM(S): 250 CAPSULE ORAL at 20:18

## 2024-01-07 RX ADMIN — Medication 2: at 02:26

## 2024-01-07 RX ADMIN — Medication 4000 MILLILITER(S): at 17:49

## 2024-01-07 RX ADMIN — Medication 100 MILLIGRAM(S): at 13:37

## 2024-01-07 RX ADMIN — Medication 100 MILLIGRAM(S): at 22:52

## 2024-01-07 RX ADMIN — INSULIN GLARGINE 33 UNIT(S): 100 INJECTION, SOLUTION SUBCUTANEOUS at 23:19

## 2024-01-07 RX ADMIN — FLUCONAZOLE 100 MILLIGRAM(S): 150 TABLET ORAL at 13:38

## 2024-01-07 RX ADMIN — MAGNESIUM OXIDE 400 MG ORAL TABLET 400 MILLIGRAM(S): 241.3 TABLET ORAL at 08:04

## 2024-01-07 RX ADMIN — Medication 1000 MILLILITER(S): at 08:00

## 2024-01-07 RX ADMIN — Medication 5 MILLIGRAM(S): at 22:52

## 2024-01-07 RX ADMIN — BUDESONIDE AND FORMOTEROL FUMARATE DIHYDRATE 1 PUFF(S): 160; 4.5 AEROSOL RESPIRATORY (INHALATION) at 06:22

## 2024-01-07 RX ADMIN — BUMETANIDE 2 MILLIGRAM(S): 0.25 INJECTION INTRAMUSCULAR; INTRAVENOUS at 06:21

## 2024-01-07 RX ADMIN — VALGANCICLOVIR 450 MILLIGRAM(S): 450 TABLET, FILM COATED ORAL at 20:18

## 2024-01-07 RX ADMIN — Medication 650 MILLIGRAM(S): at 14:54

## 2024-01-07 RX ADMIN — Medication 20 UNIT(S): at 08:05

## 2024-01-07 RX ADMIN — Medication 50 MILLILITER(S): at 12:22

## 2024-01-07 RX ADMIN — Medication 60 MILLIGRAM(S): at 06:21

## 2024-01-07 RX ADMIN — OLANZAPINE 5 MILLIGRAM(S): 15 TABLET, FILM COATED ORAL at 22:52

## 2024-01-07 RX ADMIN — Medication 2: at 08:04

## 2024-01-07 RX ADMIN — Medication 650 MILLIGRAM(S): at 14:24

## 2024-01-07 RX ADMIN — VALGANCICLOVIR 450 MILLIGRAM(S): 450 TABLET, FILM COATED ORAL at 08:01

## 2024-01-07 NOTE — CONSULT NOTE ADULT - ATTENDING COMMENTS
Agree with above. Patient with constipation/fecal impaction. S/p PEG and also had bedside manual disimpaction last night. Now feeling well, passing spontaneous BMs. Recommend standing bowel regiment - can cut down on Miralax dosing if develops diarrhea. Would recommend patient to ambulate as much as possible to improve motility.

## 2024-01-07 NOTE — PROGRESS NOTE ADULT - ATTENDING COMMENTS
57 y/o M admitted with cardiogenic shock, now s/p OHT on 12/25/2023 (CMV -/+, Toxo -/-). RHC/EMB 1/2/2024 with elevated filling pressures. Now appears dry, hold Bumex. Has received intermittent hypertonic saline this week in addition to Bumex.   Continue Hydralazine and Nifedipine for BP control.  Continue immunosuppression with Tacrolimus, CellCept 1000mg BID, Prednisone taper. Continue Tacrolimus 6.5mg q12h for today. Continue prophylaxis with Bactrim, Valcyte, Fluconazole.  Monitor leukocytosis, off antibiotics, improving.  Constipation, continue Golytely and enemas. Consult GI. 59 y/o M admitted with cardiogenic shock, now s/p OHT on 12/25/2023 (CMV -/+, Toxo -/-). RHC/EMB 1/2/2024 with elevated filling pressures. Now appears dry, hold Bumex. Has received intermittent hypertonic saline this week in addition to Bumex.   Continue Hydralazine and Nifedipine for BP control.  Continue immunosuppression with Tacrolimus, CellCept 1000mg BID, Prednisone taper. Continue Tacrolimus 6.5mg q12h for today. Continue prophylaxis with Bactrim, Valcyte, Fluconazole.  Monitor leukocytosis, off antibiotics, improving.  Constipation, continue Golytely and enemas. Consult GI. 57 y/o M admitted with cardiogenic shock, now s/p OHT on 12/25/2023 (CMV -/+, Toxo -/-). RHC/EMB 1/2/2024 with elevated filling pressures. Now appears dry, hold Bumex. Has received intermittent hypertonic saline this week in addition to Bumex. Check BMP in afternoon, if still hyponatremic, give a dose of hypertonic saline.  Continue Hydralazine and Nifedipine for BP control.  Continue immunosuppression with Tacrolimus, CellCept 1000mg BID, Prednisone taper. Continue Tacrolimus 6.5mg q12h for today. Continue prophylaxis with Bactrim, Valcyte, Fluconazole.  Monitor leukocytosis, off antibiotics, improving.  Constipation, continue Golytely and enemas. Consult GI. 59 y/o M admitted with cardiogenic shock, now s/p OHT on 12/25/2023 (CMV -/+, Toxo -/-). RHC/EMB 1/2/2024 with elevated filling pressures. Now appears dry, hold Bumex. Has received intermittent hypertonic saline this week in addition to Bumex. Check BMP in afternoon, if still hyponatremic, give a dose of hypertonic saline.  Continue Hydralazine and Nifedipine for BP control.  Continue immunosuppression with Tacrolimus, CellCept 1000mg BID, Prednisone taper. Continue Tacrolimus 6.5mg q12h for today. Continue prophylaxis with Bactrim, Valcyte, Fluconazole.  Monitor leukocytosis, off antibiotics, improving.  Constipation, continue Golytely and enemas. Consult GI.

## 2024-01-07 NOTE — PROGRESS NOTE ADULT - SUBJECTIVE AND OBJECTIVE BOX
LD VALENCIA  58y Male  MRN:[id number]    Patient is a 58y old  Male who presents with a chief complaint of NSTEMI (01 Nov 2023 20:29)    HPI:  57yo M w/ hx HTN, CAD w/ 1 stent in 2009, ICH (2008) presenting with abn ekg. Patient presented to [facility] where he was found to have STEMI, recommended to get cath however patient did not want to get it there so it left and came here.  Patient initially had cough, congestion, fever, was placed on antibiotics on Sunday.  Started feeling nauseous and had a presyncopal event after which he presented to ED last night.  Had chest pain as well.  Chest pain is midsternal.  Not currently having chest pain.  Received 4 aspirin 30 min pta. (01 Nov 2023 15:11)      Patient seen and evaluated at bedside. interval events noted    Interval HPI:   no acute events o/n     PAST MEDICAL & SURGICAL HISTORY:  HTN (hypertension)      CAD (coronary artery disease)  2009; stent      Intracranial hemorrhage  2008      Respiratory arrest  december 1st      Myocardial infarction, unspecified MI type, unspecified artery      History of coronary artery stent placement          REVIEW OF SYSTEMS:  as per hpi     VITALS:   Vital Signs Last 24 Hrs  T(C): 36.4 (07 Jan 2024 19:06), Max: 36.6 (07 Jan 2024 04:05)  T(F): 97.5 (07 Jan 2024 19:06), Max: 97.9 (07 Jan 2024 04:05)  HR: 94 (07 Jan 2024 19:06) (93 - 97)  BP: 109/71 (07 Jan 2024 19:06) (99/71 - 121/80)  BP(mean): 87 (07 Jan 2024 13:40) (87 - 87)  RR: 18 (07 Jan 2024 19:06) (18 - 18)  SpO2: 94% (07 Jan 2024 19:06) (94% - 99%)    Parameters below as of 07 Jan 2024 19:06  Patient On (Oxygen Delivery Method): room air        PHYSICAL EXAM:  GENERAL: NAD, comfortable.    HEAD:  Atraumatic, Normocephalic  EYES: EOMI, PERRLA, conjunctiva and sclera clear  NECK:  No JVD.    CHEST/LUNG: Clear to auscultation bilaterally; No wheeze    HEART: Regular rate and rhythm; No murmurs, rubs, or gallops  ABDOMEN: Soft, Nontender, Nondistended; Bowel sounds present  EXTREMITIES:  2+ Peripheral Pulses, No clubbing, cyanosis, or edema  NEUROLOGY: a0x3          Consultant(s) Notes Reviewed:  [x ] YES  [ ] NO  Care Discussed with Consultants/Other Providers [ x] YES  [ ] NO    MEDS:   MEDICATIONS  (STANDING):  budesonide  80 MICROgram(s)/formoterol 4.5 MICROgram(s) Inhaler 1 Puff(s) Inhalation two times a day  chlorhexidine 4% Liquid 1 Application(s) Topical <User Schedule>  dextrose 5%. 1000 milliLiter(s) (100 mL/Hr) IV Continuous <Continuous>  dextrose 5%. 1000 milliLiter(s) (50 mL/Hr) IV Continuous <Continuous>  dextrose 50% Injectable 50 milliLiter(s) IV Push every 15 minutes  fluconAZOLE   Tablet 100 milliGRAM(s) Oral <User Schedule>  glucagon  Injectable 1 milliGRAM(s) IntraMuscular once  heparin   Injectable 5000 Unit(s) SubCutaneous every 8 hours  hydrALAZINE 100 milliGRAM(s) Oral every 8 hours  insulin glargine Injectable (LANTUS) 33 Unit(s) SubCutaneous at bedtime  insulin lispro (ADMELOG) corrective regimen sliding scale   SubCutaneous <User Schedule>  insulin lispro (ADMELOG) corrective regimen sliding scale   SubCutaneous three times a day before meals  insulin lispro Injectable (ADMELOG) 20 Unit(s) SubCutaneous before breakfast  insulin lispro Injectable (ADMELOG) 17 Unit(s) SubCutaneous before dinner  insulin lispro Injectable (ADMELOG) 17 Unit(s) SubCutaneous before lunch  insulin regular Infusion 3 Unit(s)/Hr (3 mL/Hr) IV Continuous <Continuous>  lidocaine   4% Patch 3 Patch Transdermal daily  magnesium oxide 400 milliGRAM(s) Oral three times a day with meals  melatonin 5 milliGRAM(s) Oral at bedtime  mycophenolate mofetil 1000 milliGRAM(s) Oral every 12 hours  NIFEdipine XL 60 milliGRAM(s) Oral every 12 hours  OLANZapine 5 milliGRAM(s) Oral at bedtime  pantoprazole    Tablet 40 milliGRAM(s) Oral every 24 hours  polyethylene glycol 3350 17 Gram(s) Oral daily  predniSONE   Tablet 15 milliGRAM(s) Oral every 12 hours  senna 2 Tablet(s) Oral at bedtime  tacrolimus 6.5 milliGRAM(s) Oral <User Schedule>  trimethoprim   80 mG/sulfamethoxazole 400 mG 1 Tablet(s) Oral daily  valGANciclovir 450 milliGRAM(s) Oral every 12 hours    MEDICATIONS  (PRN):  acetaminophen     Tablet .. 650 milliGRAM(s) Oral every 6 hours PRN Mild pain  dextrose Oral Gel 15 Gram(s) Oral once PRN Blood Glucose LESS THAN 70 milliGRAM(s)/deciliter    Allergies    penicillins (Unknown)    Intolerances        LABS:                                            10.9   14.17 )-----------( 227      ( 07 Jan 2024 05:47 )             32.7   01-07    134<L>  |  93<L>  |  62<H>  ----------------------------<  96  3.9   |  26  |  1.46<H>    Ca    10.2      07 Jan 2024 18:24    TPro  6.1  /  Alb  3.5  /  TBili  0.2  /  DBili  x   /  AST  23  /  ALT  55<H>  /  AlkPhos  57  01-07      < from: CT Chest No Cont (01.05.24 @ 09:25) >  IMPRESSION:  Question mild stercoral colitis.  Expected postoperative changes in the chest. No fluid collection.        --- End of Report ---    < end of copied text >      < from: CT Abdomen and Pelvis No Cont (11.28.23 @ 03:38) >  IMPRESSION:  Mildly dilated colon and prominent but not overly dilated small bowel   with air-fluid levels. No discrete transition point. Findings are   suggestive of ileus.    Intra-aortic balloon pump with the inferior marker at the level of the   inferior mesenteric artery and the balloon overlying the origins of the   renal arteries, celiac artery, and superior mesenteric artery. Consider   repositioning. Concern for IABP positioning was discussed with LANETTE Hawthorne   on 11/28/2023 at 3:42 AM by Dr. Shepard.    < end of copied text >          < from: Colonoscopy (11.17.23 @ 10:35) >                                                                                                        Impression:          - The entire examined colon is normal on direct and retroflexion views.                       - No specimens collected.  Recommendation:      - Resume previous diet today.                       - No large polyps or masses detected, No objection from GI standpoint to                 proceed with heart transplantation/advanced heart therapies.                       - Please call back should any further questions or concerns arise.    < end of copied text >     < from: Xray Chest 1 View- PORTABLE-Urgent (11.01.23 @ 07:42) >    IMPRESSION:  Clear lungs.    ---End of Report ---        < end of copied text >  < from: TTE W or WO Ultrasound Enhancing Agent (11.01.23 @ 10:23) >  _____________________________     CONCLUSIONS:      1. Left ventricular cavity is moderately dilated. Left ventricular wall thickness is normal. Left ventricular systolic function is severely decreased with an ejection fraction of 32 % by Chinchilla's method of disks. Regional wall motion abnormalities present.   2. Multiple segmental abnormalities exist. See findings.   3. There is moderate (grade 2) left ventricular diastolic dysfunction, with indeterminant filling pressure.   4. Normal right ventricular cavity size, wall thickness, and systolic function.   5. No significant valvular disease.   6. No pericardial effusion seen.   7. Compared to the transthoracic echocardiogram performed on 1/25/2017 the areas of akinesis are unchanged but there has been a decline in LV systolic function with new areas of hypokinesis.    __________________________________________________________________    < end of copied text >  < from: TTE Limited W or WO Ultrasound Enhancing Agent (11.02.23 @ 07:41) >  __________________________     CONCLUSIONS:      1. After obtaining consent, Definity ultrasound enhancing agent was given for enhanced left ventricular opacification and improved delineation of the left ventricular endocardial borders. Left ventricular systolic function is severely decreased with a calculated ejection fraction of 22 % by the Chinchilla's biplane method of disks. There is a left ventricular thrombus.   2. Findings were discussed with Litzy BOSS on 11/2/2023 at 8.49am.   3. There is a left ventricular thrombus.    _________________________________________________________________    < end of copied text >   LD VALENCIA  58y Male  MRN:[id number]    Patient is a 58y old  Male who presents with a chief complaint of NSTEMI (01 Nov 2023 20:29)    HPI:  57yo M w/ hx HTN, CAD w/ 1 stent in 2009, ICH (2008) presenting with abn ekg. Patient presented to [facility] where he was found to have STEMI, recommended to get cath however patient did not want to get it there so it left and came here.  Patient initially had cough, congestion, fever, was placed on antibiotics on Sunday.  Started feeling nauseous and had a presyncopal event after which he presented to ED last night.  Had chest pain as well.  Chest pain is midsternal.  Not currently having chest pain.  Received 4 aspirin 30 min pta. (01 Nov 2023 15:11)      Patient seen and evaluated at bedside. interval events noted    Interval HPI:   no acute events o/n     PAST MEDICAL & SURGICAL HISTORY:  HTN (hypertension)      CAD (coronary artery disease)  2009; stent      Intracranial hemorrhage  2008      Respiratory arrest  december 1st      Myocardial infarction, unspecified MI type, unspecified artery      History of coronary artery stent placement          REVIEW OF SYSTEMS:  as per hpi     VITALS:   Vital Signs Last 24 Hrs  T(C): 36.4 (07 Jan 2024 19:06), Max: 36.6 (07 Jan 2024 04:05)  T(F): 97.5 (07 Jan 2024 19:06), Max: 97.9 (07 Jan 2024 04:05)  HR: 94 (07 Jan 2024 19:06) (93 - 97)  BP: 109/71 (07 Jan 2024 19:06) (99/71 - 121/80)  BP(mean): 87 (07 Jan 2024 13:40) (87 - 87)  RR: 18 (07 Jan 2024 19:06) (18 - 18)  SpO2: 94% (07 Jan 2024 19:06) (94% - 99%)    Parameters below as of 07 Jan 2024 19:06  Patient On (Oxygen Delivery Method): room air        PHYSICAL EXAM:  GENERAL: NAD, comfortable.    HEAD:  Atraumatic, Normocephalic  EYES: EOMI, PERRLA, conjunctiva and sclera clear  NECK:  No JVD.    CHEST/LUNG: Clear to auscultation bilaterally; No wheeze    HEART: Regular rate and rhythm; No murmurs, rubs, or gallops  ABDOMEN: Soft, Nontender, Nondistended; Bowel sounds present  EXTREMITIES:  2+ Peripheral Pulses, No clubbing, cyanosis, or edema  NEUROLOGY: a0x3          Consultant(s) Notes Reviewed:  [x ] YES  [ ] NO  Care Discussed with Consultants/Other Providers [ x] YES  [ ] NO    MEDS:   MEDICATIONS  (STANDING):  budesonide  80 MICROgram(s)/formoterol 4.5 MICROgram(s) Inhaler 1 Puff(s) Inhalation two times a day  chlorhexidine 4% Liquid 1 Application(s) Topical <User Schedule>  dextrose 5%. 1000 milliLiter(s) (100 mL/Hr) IV Continuous <Continuous>  dextrose 5%. 1000 milliLiter(s) (50 mL/Hr) IV Continuous <Continuous>  dextrose 50% Injectable 50 milliLiter(s) IV Push every 15 minutes  fluconAZOLE   Tablet 100 milliGRAM(s) Oral <User Schedule>  glucagon  Injectable 1 milliGRAM(s) IntraMuscular once  heparin   Injectable 5000 Unit(s) SubCutaneous every 8 hours  hydrALAZINE 100 milliGRAM(s) Oral every 8 hours  insulin glargine Injectable (LANTUS) 33 Unit(s) SubCutaneous at bedtime  insulin lispro (ADMELOG) corrective regimen sliding scale   SubCutaneous <User Schedule>  insulin lispro (ADMELOG) corrective regimen sliding scale   SubCutaneous three times a day before meals  insulin lispro Injectable (ADMELOG) 20 Unit(s) SubCutaneous before breakfast  insulin lispro Injectable (ADMELOG) 17 Unit(s) SubCutaneous before dinner  insulin lispro Injectable (ADMELOG) 17 Unit(s) SubCutaneous before lunch  insulin regular Infusion 3 Unit(s)/Hr (3 mL/Hr) IV Continuous <Continuous>  lidocaine   4% Patch 3 Patch Transdermal daily  magnesium oxide 400 milliGRAM(s) Oral three times a day with meals  melatonin 5 milliGRAM(s) Oral at bedtime  mycophenolate mofetil 1000 milliGRAM(s) Oral every 12 hours  NIFEdipine XL 60 milliGRAM(s) Oral every 12 hours  OLANZapine 5 milliGRAM(s) Oral at bedtime  pantoprazole    Tablet 40 milliGRAM(s) Oral every 24 hours  polyethylene glycol 3350 17 Gram(s) Oral daily  predniSONE   Tablet 15 milliGRAM(s) Oral every 12 hours  senna 2 Tablet(s) Oral at bedtime  tacrolimus 6.5 milliGRAM(s) Oral <User Schedule>  trimethoprim   80 mG/sulfamethoxazole 400 mG 1 Tablet(s) Oral daily  valGANciclovir 450 milliGRAM(s) Oral every 12 hours    MEDICATIONS  (PRN):  acetaminophen     Tablet .. 650 milliGRAM(s) Oral every 6 hours PRN Mild pain  dextrose Oral Gel 15 Gram(s) Oral once PRN Blood Glucose LESS THAN 70 milliGRAM(s)/deciliter    Allergies    penicillins (Unknown)    Intolerances        LABS:                                            10.9   14.17 )-----------( 227      ( 07 Jan 2024 05:47 )             32.7   01-07    134<L>  |  93<L>  |  62<H>  ----------------------------<  96  3.9   |  26  |  1.46<H>    Ca    10.2      07 Jan 2024 18:24    TPro  6.1  /  Alb  3.5  /  TBili  0.2  /  DBili  x   /  AST  23  /  ALT  55<H>  /  AlkPhos  57  01-07      < from: CT Chest No Cont (01.05.24 @ 09:25) >  IMPRESSION:  Question mild stercoral colitis.  Expected postoperative changes in the chest. No fluid collection.        --- End of Report ---    < end of copied text >      < from: CT Abdomen and Pelvis No Cont (11.28.23 @ 03:38) >  IMPRESSION:  Mildly dilated colon and prominent but not overly dilated small bowel   with air-fluid levels. No discrete transition point. Findings are   suggestive of ileus.    Intra-aortic balloon pump with the inferior marker at the level of the   inferior mesenteric artery and the balloon overlying the origins of the   renal arteries, celiac artery, and superior mesenteric artery. Consider   repositioning. Concern for IABP positioning was discussed with LANETTE Hawthorne   on 11/28/2023 at 3:42 AM by Dr. Shepard.    < end of copied text >          < from: Colonoscopy (11.17.23 @ 10:35) >                                                                                                        Impression:          - The entire examined colon is normal on direct and retroflexion views.                       - No specimens collected.  Recommendation:      - Resume previous diet today.                       - No large polyps or masses detected, No objection from GI standpoint to                 proceed with heart transplantation/advanced heart therapies.                       - Please call back should any further questions or concerns arise.    < end of copied text >     < from: Xray Chest 1 View- PORTABLE-Urgent (11.01.23 @ 07:42) >    IMPRESSION:  Clear lungs.    ---End of Report ---        < end of copied text >  < from: TTE W or WO Ultrasound Enhancing Agent (11.01.23 @ 10:23) >  _____________________________     CONCLUSIONS:      1. Left ventricular cavity is moderately dilated. Left ventricular wall thickness is normal. Left ventricular systolic function is severely decreased with an ejection fraction of 32 % by Chinchilla's method of disks. Regional wall motion abnormalities present.   2. Multiple segmental abnormalities exist. See findings.   3. There is moderate (grade 2) left ventricular diastolic dysfunction, with indeterminant filling pressure.   4. Normal right ventricular cavity size, wall thickness, and systolic function.   5. No significant valvular disease.   6. No pericardial effusion seen.   7. Compared to the transthoracic echocardiogram performed on 1/25/2017 the areas of akinesis are unchanged but there has been a decline in LV systolic function with new areas of hypokinesis.    __________________________________________________________________    < end of copied text >  < from: TTE Limited W or WO Ultrasound Enhancing Agent (11.02.23 @ 07:41) >  __________________________     CONCLUSIONS:      1. After obtaining consent, Definity ultrasound enhancing agent was given for enhanced left ventricular opacification and improved delineation of the left ventricular endocardial borders. Left ventricular systolic function is severely decreased with a calculated ejection fraction of 22 % by the Chinchilla's biplane method of disks. There is a left ventricular thrombus.   2. Findings were discussed with Litzy BOSS on 11/2/2023 at 8.49am.   3. There is a left ventricular thrombus.    _________________________________________________________________    < end of copied text >

## 2024-01-07 NOTE — PROGRESS NOTE ADULT - ATTENDING SUPERVISION STATEMENT
Fellow
Resident
Fellow
Fellow
Resident
Resident/Fellow
Fellow
Resident
Fellow
Resident
Resident
Fellow
Resident
Resident
Fellow
Fellow
Resident
Resident/Fellow
Fellow
Resident
Resident
Fellow
Resident
Fellow

## 2024-01-07 NOTE — CONSULT NOTE ADULT - CONSULT REQUESTED BY NAME
ED
Ena Saavedra
primary
primary team
[facility]
CICU/HF
Dr. Gutierrez
med team
primary
Anna Cunningham
Primary team
Medicine
Dr. Durand
Dr. Cunningham
Tasha Shoemaker)
Dr. Gutierrez

## 2024-01-07 NOTE — PROGRESS NOTE ADULT - PROBLEM SELECTOR PLAN 5
- pretransplant was noted to have positive BCx staph epi, Enterococcus faecalis and Cutibacterium   - s/p IABP exchange from RFA to LFA on 11/20.  - appreciated transplant ID recs.  - post transplant has been afebrile however was noted to have bump in WBC  - RVP negative  - Blood cx NGTD  - procalcitonin normal  - CT unrevealing

## 2024-01-07 NOTE — PROGRESS NOTE ADULT - PROBLEM SELECTOR PLAN 3
- pretransplant was noted to have positive BCx staph epi, Enterococcus faecalis and Cutibacterium   - BC 1/3 NGTD  - wbc 18--- UA ordered-- negative

## 2024-01-07 NOTE — PROGRESS NOTE ADULT - PROBLEM SELECTOR PLAN 6
- Pt noting significant constipation and CT showing stercoral colitis 1/5  - S/p MgCO3  Plan:  - Cont lactulose 20 QID +/- golytely until pt starts to have regular BMs  - Can also use bisacodyl suppository if pt amenable   - Encourage ambulation

## 2024-01-07 NOTE — PROGRESS NOTE ADULT - ASSESSMENT
57 yo male h/o htn, cad s/p pci, ICH, here with NSTEMI  s/p intubation and cath. now in CCU    NSTEMI  s/p  cath  cath results noted. multi vessel dz., CTSx f/u.    pt with vtach/fib arrest again on 11/8. s/p ACLS and ROSC.   in ccu with iabp   plan for transplant as per HF and transplant team  transplant w/u ongoing.   s/p colonoscopy 11/17. normal  now listed for transplant as of 11/22 12/25: pt s/p heart transplant last night. remains intubated with iabp in CTU.   12/26: pt extubated to high flow this am. IABP currently being removed. pt a0x3.   12/27: pt feels well. walked with PT this am. currently comforable sitting in chair. IABP removed. wean off pressors as able.   12/28-29: no acute events o/n. weaning down ionotropes/pressors as able. immunosuppressives as per transplant team. PT  12/30: feels well. pressors/ionotropes weaned off. removal of chest tubes as able. immunosupressives as per id. PT.  12/31: feels well. tx out of ctu to step down unit. cont care as per ctsx team. PT  1/1: no acute events o/n. to transition off insulin gtt today. PT. d/w family bedside  1/2: no acute events o/n. plan on removal of a line and chest tubes today as per ctsx. dm mngt as per endo  1/3: feels well. s/p cardiac biopsy yesterday. worked with PT today. one CT remaining.   1/4: no acute events o/n. FS improved. mngt of CT as per CTSx   1/5: doing well. no c/o. leukocytosis noted. steroids likely contributing.  CT noted. pt reports +BM today  1/6: no acute events. +BM today. cont care as per CTSx team. eager to go home   1/7: no acute events o/n. CT with sterocal colitis. gi consulted. bowel regimen as per gi.      mngt as per CTSx team          Advanced care planning was discussed with patient and family.  Advanced care planning forms were reviewed and discussed as appropriate.  Differential diagnosis and plan of care discussed with patient after the evaluation.   Pain assessed and judicious use of narcotics when appropriate was discussed.  Importance of Fall prevention discussed.  Counseling on Smoking and Alcohol cessation was offered when appropriate.  Counseling on Diet, exercise, and medication compliance was done.       Approx 75 minutes spent.

## 2024-01-07 NOTE — PROGRESS NOTE ADULT - ASSESSMENT
58M : HFrEF (LVIDd 6.4 cm, LVEF 32%), PCI ('08), HTN, DMT2 (A1c 8.3%) and CVA s/p TPA ('18), recently treated for PNA who initially presented to [facility] via EMS after syncope reportedly requiring defibrillation. Treated for ACS but left AMA to come to [facility]. On arrival ECG with ST depression in lateral leads. Intubated 11/1 for respiratory failure and was found to have COVID. L/RHC 11/1revealing  of LAD and RCA, elevated filling pressures and CI of 1.5 prompting placement of IABP. Extubated 11/3. IABP was weaned to off 11/7, however the following day developed PMVT cardiac arrest with prompt CPR and defibrillation, started on IV Lidocaine/Amio and IABP ultimately replaced on 11/9. During this admission was found to be bacteremic for which she was treated for Staph epi, Enterococcus faecalis, Cutibacterium with IV abx.  Was listed Status 2, ABO A, PRA 0% (12/12).     A suitable donor was identified and on 12/25, he underwent OHT (ischemic Time: 253min, CMV -/+, Toxo -/-). The following day, extubated and IABP removed. Milrinone was being gradually weaned but when turned off yesterday, despite adequate perfusion indicies, had rise in lactate for which inotropic support was resumed. Currently appears well supported and compensated. Will again trial wean off inotrope. Will also adjust oral antihypertensive regimen with goal to wean off Cardene gtt. Plan for 1st EMBX next week on 1/2.     On 12/25/23, pt underwent OHT  - IABP removed 12/26  - Sabrina and Epi weaned off 12/26  - Milrinone off  - Procardia XL 60 mg BID  - Bumex TID  - 1st RHC/EMBx on Tuesday 1/2  - Transfer to Stepdown 12/31  -  Right femerol rangel in place   - Med CT x 3 - LWS      insulin gtt d/t hyperglycemia - house endo following   - d/c planning - aniticipate home   1/1/24 - VSS - pt. c/o uncomfortable bed last evening - stating he would sign out AMA if he was not given a more comfortable bed.  staff obliged & a new bed was brought in.  pt requesting new urinal with each void as he "is at risk for an infection"  Ptt properly educated on post transplant risks.  states, "A doctor told me I can only use a urinal once to prevent infection"  transplant team to reinforce post-transplant precautions. MED CT x 3 in place  ?d/c femerol rangel today - will rounds with transplant team  1/2/24 VSS - NPO for cardiac bx -rhc this am-  pt refused blood draw this am despite provider discussing the importance of blood levels post transplant.  Pt acquiesced - allowed blood draw @ 7:30a-d/c planning -   1/3 s/p biopsy and rhc yesterday Ms ct x 1 remains in place.  Glucose improved, currentyly off insulin infusion  1/4/23   F/u cultures, rvp neg Glucose stable overnight MS ct remains in place  1/5  VSS  afebrile Na+ 133.  Wbc 16.  Ct c/a/p results pnd.  RVP neg.  BC-ngtd.  -200cc/24.  BS 99.  Tacro 8.2  1/6 VSS  afebrile  Na 132 Wbc 18.2.  UA ordered.   CT a/p/c mild stercoral colitis.  -1.7L  BC NG 48 hrs.  Tacro 10.6  1/7 VSS, WBC 14.1, afebrile, UA negative, , no BM yet, plan for Golytely today. Check Abd Xray. Tacro level 58M : HFrEF (LVIDd 6.4 cm, LVEF 32%), PCI ('08), HTN, DMT2 (A1c 8.3%) and CVA s/p TPA ('18), recently treated for PNA who initially presented to [facility] via EMS after syncope reportedly requiring defibrillation. Treated for ACS but left AMA to come to [facility]. On arrival ECG with ST depression in lateral leads. Intubated 11/1 for respiratory failure and was found to have COVID. L/RHC 11/1revealing  of LAD and RCA, elevated filling pressures and CI of 1.5 prompting placement of IABP. Extubated 11/3. IABP was weaned to off 11/7, however the following day developed PMVT cardiac arrest with prompt CPR and defibrillation, started on IV Lidocaine/Amio and IABP ultimately replaced on 11/9. During this admission was found to be bacteremic for which she was treated for Staph epi, Enterococcus faecalis, Cutibacterium with IV abx.  Was listed Status 2, ABO A, PRA 0% (12/12).     A suitable donor was identified and on 12/25, he underwent OHT (ischemic Time: 253min, CMV -/+, Toxo -/-). The following day, extubated and IABP removed. Milrinone was being gradually weaned but when turned off yesterday, despite adequate perfusion indicies, had rise in lactate for which inotropic support was resumed. Currently appears well supported and compensated. Will again trial wean off inotrope. Will also adjust oral antihypertensive regimen with goal to wean off Cardene gtt. Plan for 1st EMBX next week on 1/2.     On 12/25/23, pt underwent OHT  - IABP removed 12/26  - Sabrina and Epi weaned off 12/26  - Milrinone off  - Procardia XL 60 mg BID  - Bumex TID  - 1st RHC/EMBx on Tuesday 1/2  - Transfer to Stepdown 12/31  -  Right femerol rangel in place   - Med CT x 3 - LWS      insulin gtt d/t hyperglycemia - house endo following   - d/c planning - aniticipate home   1/1/24 - VSS - pt. c/o uncomfortable bed last evening - stating he would sign out AMA if he was not given a more comfortable bed.  staff obliged & a new bed was brought in.  pt requesting new urinal with each void as he "is at risk for an infection"  Ptt properly educated on post transplant risks.  states, "A doctor told me I can only use a urinal once to prevent infection"  transplant team to reinforce post-transplant precautions. MED CT x 3 in place  ?d/c femerol rangel today - will rounds with transplant team  1/2/24 VSS - NPO for cardiac bx -rhc this am-  pt refused blood draw this am despite provider discussing the importance of blood levels post transplant.  Pt acquiesced - allowed blood draw @ 7:30a-d/c planning -   1/3 s/p biopsy and rhc yesterday Ms ct x 1 remains in place.  Glucose improved, currentyly off insulin infusion  1/4/23   F/u cultures, rvp neg Glucose stable overnight MS ct remains in place  1/5  VSS  afebrile Na+ 133.  Wbc 16.  Ct c/a/p results pnd.  RVP neg.  BC-ngtd.  -200cc/24.  BS 99.  Tacro 8.2  1/6 VSS  afebrile  Na 132 Wbc 18.2.  UA ordered.   CT a/p/c mild stercoral colitis.  -1.7L  BC NG 48 hrs.  Tacro 10.6  1/7 VSS, WBC 14.1, afebrile, UA negative, , no BM yet, plan for Golytely today. Check Abd Xray. Tacro level 58M : HFrEF (LVIDd 6.4 cm, LVEF 32%), PCI ('08), HTN, DMT2 (A1c 8.3%) and CVA s/p TPA ('18), recently treated for PNA who initially presented to [facility] via EMS after syncope reportedly requiring defibrillation. Treated for ACS but left AMA to come to [facility]. On arrival ECG with ST depression in lateral leads. Intubated 11/1 for respiratory failure and was found to have COVID. L/RHC 11/1revealing  of LAD and RCA, elevated filling pressures and CI of 1.5 prompting placement of IABP. Extubated 11/3. IABP was weaned to off 11/7, however the following day developed PMVT cardiac arrest with prompt CPR and defibrillation, started on IV Lidocaine/Amio and IABP ultimately replaced on 11/9. During this admission was found to be bacteremic for which she was treated for Staph epi, Enterococcus faecalis, Cutibacterium with IV abx.  Was listed Status 2, ABO A, PRA 0% (12/12).     A suitable donor was identified and on 12/25, he underwent OHT (ischemic Time: 253min, CMV -/+, Toxo -/-). The following day, extubated and IABP removed. Milrinone was being gradually weaned but when turned off yesterday, despite adequate perfusion indicies, had rise in lactate for which inotropic support was resumed. Currently appears well supported and compensated. Will again trial wean off inotrope. Will also adjust oral antihypertensive regimen with goal to wean off Cardene gtt. Plan for 1st EMBX next week on 1/2.     On 12/25/23, pt underwent OHT  - IABP removed 12/26  - Sabrina and Epi weaned off 12/26  - Milrinone off  - Procardia XL 60 mg BID  - Bumex TID  - 1st RHC/EMBx on Tuesday 1/2  - Transfer to Stepdown 12/31  -  Right femerol rangel in place   - Med CT x 3 - LWS      insulin gtt d/t hyperglycemia - house endo following   - d/c planning - aniticipate home   1/1/24 - VSS - pt. c/o uncomfortable bed last evening - stating he would sign out AMA if he was not given a more comfortable bed.  staff obliged & a new bed was brought in.  pt requesting new urinal with each void as he "is at risk for an infection"  Ptt properly educated on post transplant risks.  states, "A doctor told me I can only use a urinal once to prevent infection"  transplant team to reinforce post-transplant precautions. MED CT x 3 in place  ?d/c femerol rangel today - will rounds with transplant team  1/2/24 VSS - NPO for cardiac bx -rhc this am-  pt refused blood draw this am despite provider discussing the importance of blood levels post transplant.  Pt acquiesced - allowed blood draw @ 7:30a-d/c planning -   1/3 s/p biopsy and rhc yesterday Ms ct x 1 remains in place.  Glucose improved, currentyly off insulin infusion  1/4/23   F/u cultures, rvp neg Glucose stable overnight MS ct remains in place  1/5  VSS  afebrile Na+ 133.  Wbc 16.  Ct c/a/p results pnd.  RVP neg.  BC-ngtd.  -200cc/24.  BS 99.  Tacro 8.2  1/6 VSS  afebrile  Na 132 Wbc 18.2.  UA ordered.   CT a/p/c mild stercoral colitis.  -1.7L  BC NG 48 hrs.  Tacro 10.6  1/7 VSS, WBC 14.1, afebrile, UA negative, , Bumex dc'd. Repeat BMP this afternoon. Plan for Golytely & enema today, pending BM. Check Abd Xray. Tacro level 12.2. 58M : HFrEF (LVIDd 6.4 cm, LVEF 32%), PCI ('08), HTN, DMT2 (A1c 8.3%) and CVA s/p TPA ('18), recently treated for PNA who initially presented to [facility] via EMS after syncope reportedly requiring defibrillation. Treated for ACS but left AMA to come to [facility]. On arrival ECG with ST depression in lateral leads. Intubated 11/1 for respiratory failure and was found to have COVID. L/RHC 11/1revealing  of LAD and RCA, elevated filling pressures and CI of 1.5 prompting placement of IABP. Extubated 11/3. IABP was weaned to off 11/7, however the following day developed PMVT cardiac arrest with prompt CPR and defibrillation, started on IV Lidocaine/Amio and IABP ultimately replaced on 11/9. During this admission was found to be bacteremic for which she was treated for Staph epi, Enterococcus faecalis, Cutibacterium with IV abx.  Was listed Status 2, ABO A, PRA 0% (12/12).     A suitable donor was identified and on 12/25, he underwent OHT (ischemic Time: 253min, CMV -/+, Toxo -/-). The following day, extubated and IABP removed. Milrinone was being gradually weaned but when turned off yesterday, despite adequate perfusion indicies, had rise in lactate for which inotropic support was resumed. Currently appears well supported and compensated. Will again trial wean off inotrope. Will also adjust oral antihypertensive regimen with goal to wean off Cardene gtt. Plan for 1st EMBX next week on 1/2.     On 12/25/23, pt underwent OHT  - IABP removed 12/26  - Sabrina and Epi weaned off 12/26  - Milrinone off  - Procardia XL 60 mg BID  - Bumex TID  - 1st RHC/EMBx on Tuesday 1/2  - Transfer to Stepdown 12/31  -  Right femerol rangel in place   - Med CT x 3 - LWS      insulin gtt d/t hyperglycemia - house endo following   - d/c planning - aniticipate home   1/1/24 - VSS - pt. c/o uncomfortable bed last evening - stating he would sign out AMA if he was not given a more comfortable bed.  staff obliged & a new bed was brought in.  pt requesting new urinal with each void as he "is at risk for an infection"  Ptt properly educated on post transplant risks.  states, "A doctor told me I can only use a urinal once to prevent infection"  transplant team to reinforce post-transplant precautions. MED CT x 3 in place  ?d/c femerol rangel today - will rounds with transplant team  1/2/24 VSS - NPO for cardiac bx -rhc this am-  pt refused blood draw this am despite provider discussing the importance of blood levels post transplant.  Pt acquiesced - allowed blood draw @ 7:30a-d/c planning -   1/3 s/p biopsy and rhc yesterday Ms ct x 1 remains in place.  Glucose improved, currentyly off insulin infusion  1/4/23   F/u cultures, rvp neg Glucose stable overnight MS ct remains in place  1/5  VSS  afebrile Na+ 133.  Wbc 16.  Ct c/a/p results pnd.  RVP neg.  BC-ngtd.  -200cc/24.  BS 99.  Tacro 8.2  1/6 VSS  afebrile  Na 132 Wbc 18.2.  UA ordered.   CT a/p/c mild stercoral colitis.  -1.7L  BC NG 48 hrs.  Tacro 10.6  1/7 VSS, WBC 14.1, afebrile, UA negative, , Bumex dc'd. Repeat BMP this afternoon. Plan for Golytely & enema today, pending BM. Check Abd Xray. Tacro level 12.2.

## 2024-01-07 NOTE — CONSULT NOTE ADULT - PROVIDER SPECIALTY LIST ADULT
Dental
Psychology
Psychology
Vascular Cardiology
Gastroenterology
CT Surgery
Hepatology
CT Surgery
Infectious Disease
Nephrology-Cardiorenal
Electrophysiology
Heart Failure
Pulmonology
Gastroenterology
Cardiology
Dermatology
Palliative Care
Endocrinology

## 2024-01-07 NOTE — PROGRESS NOTE ADULT - ASSESSMENT
59 yo M with HFrEF (LVIDd 6.4 cm, LVEF 32%), CAD s/p PCI (2008), HTN, DMT2 (A1c 8.3%) and CVA s/p TPA (2018), recently treated for PNA who initially presented to [facility] via EMS after syncope reportedly requiring defibrillation. Treated for ACS but left AMA to come to [facility]. On arrival ECG with ST depression in lateral leads. Intubated 11/1 for respiratory failure and was found to have COVID. L/RHC 11/1revealing  of LAD and RCA, elevated filling pressures and CI of 1.5 prompting placement of IABP. Extubated 11/3. IABP was weaned to off 11/7, however the following day developed PMVT cardiac arrest with prompt CPR and defibrillation, started on IV Lidocaine/Amio and IABP ultimately replaced on 11/9. During this admission was found to be bacteremic for which she was treated for Staph epi, Enterococcus faecalis, Cutibacterium with IV abx.  Was listed Status 2, ABO A, PRA 0% (12/12).     A suitable donor was identified and on 12/25, he underwent OHT (ischemic Time: 253min, CMV -/+, Toxo -/-). The following day, extubated and IABP removed. Overall progressing well. He underwent RHC/EMBx with revealed elevated filling pressures and normal cardiac output. His diuretics have been increased and he is responding well. He continues to have elevated WBC however remains afebrile and off antibiotics. Currently undergoing infectious work up. Next RHC/EMBx schedule Tuesday 1/9 59 yo M with HFrEF (LVIDd 6.4 cm, LVEF 32%), CAD s/p PCI (2008), HTN, DMT2 (A1c 8.3%) and CVA s/p TPA (2018), recently treated for PNA who initially presented to [facility] via EMS after syncope reportedly requiring defibrillation. Treated for ACS but left AMA to come to [facility]. On arrival ECG with ST depression in lateral leads. Intubated 11/1 for respiratory failure and was found to have COVID. L/RHC 11/1revealing  of LAD and RCA, elevated filling pressures and CI of 1.5 prompting placement of IABP. Extubated 11/3. IABP was weaned to off 11/7, however the following day developed PMVT cardiac arrest with prompt CPR and defibrillation, started on IV Lidocaine/Amio and IABP ultimately replaced on 11/9. During this admission was found to be bacteremic for which she was treated for Staph epi, Enterococcus faecalis, Cutibacterium with IV abx.  Was listed Status 2, ABO A, PRA 0% (12/12).     A suitable donor was identified and on 12/25, he underwent OHT (ischemic Time: 253min, CMV -/+, Toxo -/-). The following day, extubated and IABP removed. Overall progressing well. He underwent RHC/EMBx with revealed elevated filling pressures and normal cardiac output. His diuretics have been increased and he is responding well. He continues to have elevated WBC however remains afebrile and off antibiotics. Currently undergoing infectious work up. Next RHC/EMBx schedule Tuesday 1/9

## 2024-01-07 NOTE — CONSULT NOTE ADULT - SUBJECTIVE AND OBJECTIVE BOX
Chief Complaint:  Patient is a 58y old  Male who presents with a chief complaint of s/p heart transplant (2024 15:14)      HPI:  57 yo M with HFrEF (LVIDd 6.4 cm, LVEF 32%), CAD s/p PCI (), HTN, DMT2 (A1c 8.3%) and CVA s/p TPA (), initially presented to [facility] via EMS after syncope reportedly requiring defibrillation. Treated for ACS but left AMA to come to [facility]. On arrival ECG with ST depression in lateral leads. Intubated  for respiratory failure and was found to have COVID. L/RHC  revealing  of LAD and RCA, elevated filling pressures and CI of 1.5 prompting placement of IABP. Extubated 11/3. IABP was weaned to off , however the following day developed PMVT cardiac arrest with prompt CPR and defibrillation, started on IV Lidocaine/Amio and IABP ultimately replaced on . During this admission was found to be bacteremic for which she was treated for Staph epi, Enterococcus faecalis, Cutibacterium with IV abx.  Was listed Status 2, ABO A, PRA 0% (). A suitable donor was identified and on , he underwent OHT (ischemic Time: 253min, CMV -/+, Toxo -/-). The following day, extubated and IABP removed. Overall progressing well. He underwent RHC/EMBx with revealed elevated filling pressures and normal cardiac output. His diuretics have been increased and he is responding well. He continues to have elevated WBC however remains afebrile and off antibiotics. Currently undergoing infectious work up. Next RHC/EMBx schedule . GI consulted for constipation.     Allergies:  penicillins (Unknown)      Home Medications:    Hospital Medications:  acetaminophen     Tablet .. 650 milliGRAM(s) Oral every 6 hours PRN  budesonide  80 MICROgram(s)/formoterol 4.5 MICROgram(s) Inhaler 1 Puff(s) Inhalation two times a day  chlorhexidine 4% Liquid 1 Application(s) Topical <User Schedule>  dextrose 5%. 1000 milliLiter(s) IV Continuous <Continuous>  dextrose 5%. 1000 milliLiter(s) IV Continuous <Continuous>  dextrose 50% Injectable 50 milliLiter(s) IV Push every 15 minutes  dextrose Oral Gel 15 Gram(s) Oral once PRN  fluconAZOLE   Tablet 100 milliGRAM(s) Oral <User Schedule>  glucagon  Injectable 1 milliGRAM(s) IntraMuscular once  heparin   Injectable 5000 Unit(s) SubCutaneous every 8 hours  hydrALAZINE 100 milliGRAM(s) Oral every 8 hours  insulin glargine Injectable (LANTUS) 33 Unit(s) SubCutaneous at bedtime  insulin lispro (ADMELOG) corrective regimen sliding scale   SubCutaneous three times a day before meals  insulin lispro (ADMELOG) corrective regimen sliding scale   SubCutaneous <User Schedule>  insulin lispro Injectable (ADMELOG) 17 Unit(s) SubCutaneous before lunch  insulin lispro Injectable (ADMELOG) 20 Unit(s) SubCutaneous before breakfast  insulin lispro Injectable (ADMELOG) 17 Unit(s) SubCutaneous before dinner  insulin regular Infusion 3 Unit(s)/Hr IV Continuous <Continuous>  lidocaine   4% Patch 3 Patch Transdermal daily  magnesium oxide 400 milliGRAM(s) Oral three times a day with meals  melatonin 5 milliGRAM(s) Oral at bedtime  mycophenolate mofetil 1000 milliGRAM(s) Oral every 12 hours  NIFEdipine XL 60 milliGRAM(s) Oral every 12 hours  OLANZapine 5 milliGRAM(s) Oral at bedtime  pantoprazole    Tablet 40 milliGRAM(s) Oral every 24 hours  polyethylene glycol 3350 17 Gram(s) Oral daily  predniSONE   Tablet 15 milliGRAM(s) Oral every 12 hours  senna 2 Tablet(s) Oral at bedtime  tacrolimus 6.5 milliGRAM(s) Oral <User Schedule>  trimethoprim   80 mG/sulfamethoxazole 400 mG 1 Tablet(s) Oral daily  valGANciclovir 450 milliGRAM(s) Oral every 12 hours      PMHX/PSHX:  HTN (hypertension)    CAD (coronary artery disease)    Intracranial hemorrhage    Respiratory arrest    Myocardial infarction, unspecified MI type, unspecified artery    History of coronary artery stent placement        Family history:  Family history of meningitis (Sibling)    Family history of hypertension in mother      Social History:     Tob: Denies  EtOH: Denies  Illicit Drugs: Denies    ROS:   General:  No wt loss, fevers, chills, night sweats, fatigue  Eyes:  Good vision, no reported pain  ENT:  No sore throat, pain, runny nose, dysphagia  CV:  No pain, palpitations, hypo/hypertension  Pulm:  No dyspnea, cough, tachypnea, wheezing  GI:  As per HPI  :  No pain, bleeding, incontinence, nocturia  Muscle:  No pain, weakness  Neuro:  No weakness, tingling, memory problems  Psych:  No fatigue, insomnia, mood problems, depression  Endocrine:  No polyuria, polydipsia, cold/heat intolerance  Heme:  No petechiae, ecchymosis, easy bruisability  Skin:  No rash, tattoos, scars, edema    PHYSICAL EXAM:   GENERAL:  uncomfortable appearing  HEENT:  Normocephalic/atraumatic, no scleral icterus  CHEST:  No accessory muscle use  HEART:  Regular rate and rhythm  ABDOMEN:  Soft, non-tender, distended  EXTREMITIES: No cyanosis, clubbing, or edema  SKIN:  No rash  NEURO:  Alert and oriented x 3, no asterixis    Vital Signs:  Vital Signs Last 24 Hrs  T(C): 36.3 (2024 12:00), Max: 36.6 (2024 19:00)  T(F): 97.3 (2024 12:00), Max: 97.9 (2024 19:00)  HR: 94 (2024 17:49) (93 - 103)  BP: 114/75 (2024 17:49) (99/71 - 121/80)  BP(mean): 87 (2024 13:40) (87 - 87)  RR: 18 (2024 12:00) (18 - 18)  SpO2: 99% (2024 12:00) (96% - 99%)    Parameters below as of 2024 12:00  Patient On (Oxygen Delivery Method): room air      Daily     Daily Weight in k.2 (2024 07:21)    LABS:                        10.9   14.17 )-----------( 227      ( 2024 05:47 )             32.7     Mean Cell Volume: 89.1 fl (24 @ 05:47)        125<L>  |  90<L>  |  63<H>  ----------------------------<  228<H>  5.0   |  24  |  1.59<H>    Ca    9.7      2024 05:46    TPro  6.1  /  Alb  3.5  /  TBili  0.2  /  DBili  x   /  AST  23  /  ALT  55<H>  /  AlkPhos  57  01-07    LIVER FUNCTIONS - ( 2024 05:46 )  Alb: 3.5 g/dL / Pro: 6.1 g/dL / ALK PHOS: 57 U/L / ALT: 55 U/L / AST: 23 U/L / GGT: x             Urinalysis Basic - ( 2024 05:46 )    Color: x / Appearance: x / SG: x / pH: x  Gluc: 228 mg/dL / Ketone: x  / Bili: x / Urobili: x   Blood: x / Protein: x / Nitrite: x   Leuk Esterase: x / RBC: x / WBC x   Sq Epi: x / Non Sq Epi: x / Bacteria: x                              10.9   14.17 )-----------( 227      ( 2024 05:47 )             32.7                         11.3   18.24 )-----------( 226      ( 2024 07:16 )             33.3                         11.6   16.03 )-----------( 212      ( 2024 07:06 )             34.1       Imaging:           Chief Complaint:  Patient is a 58y old  Male who presents with a chief complaint of s/p heart transplant (2024 15:14)      HPI:  57 yo M with HFrEF (LVIDd 6.4 cm, LVEF 32%), CAD s/p PCI (), HTN, DMT2 (A1c 8.3%) and CVA s/p TPA (), initially presented to [facility] via EMS after syncope reportedly requiring defibrillation. Treated for ACS but left AMA to come to [facility]. On arrival ECG with ST depression in lateral leads. Intubated  for respiratory failure and was found to have COVID. L/RHC  revealing  of LAD and RCA, elevated filling pressures and CI of 1.5 prompting placement of IABP. Extubated 11/3. IABP was weaned to off , however the following day developed PMVT cardiac arrest with prompt CPR and defibrillation, started on IV Lidocaine/Amio and IABP ultimately replaced on . During this admission was found to be bacteremic for which she was treated for Staph epi, Enterococcus faecalis, Cutibacterium with IV abx.  Was listed Status 2, ABO A, PRA 0% (). A suitable donor was identified and on , he underwent OHT (ischemic Time: 253min, CMV -/+, Toxo -/-). The following day, extubated and IABP removed. Overall progressing well. He underwent RHC/EMBx with revealed elevated filling pressures and normal cardiac output. His diuretics have been increased and he is responding well. He continues to have elevated WBC however remains afebrile and off antibiotics. Currently undergoing infectious work up. Next RHC/EMBx schedule . GI consulted for constipation.     Allergies:  penicillins (Unknown)      Home Medications:    Hospital Medications:  acetaminophen     Tablet .. 650 milliGRAM(s) Oral every 6 hours PRN  budesonide  80 MICROgram(s)/formoterol 4.5 MICROgram(s) Inhaler 1 Puff(s) Inhalation two times a day  chlorhexidine 4% Liquid 1 Application(s) Topical <User Schedule>  dextrose 5%. 1000 milliLiter(s) IV Continuous <Continuous>  dextrose 5%. 1000 milliLiter(s) IV Continuous <Continuous>  dextrose 50% Injectable 50 milliLiter(s) IV Push every 15 minutes  dextrose Oral Gel 15 Gram(s) Oral once PRN  fluconAZOLE   Tablet 100 milliGRAM(s) Oral <User Schedule>  glucagon  Injectable 1 milliGRAM(s) IntraMuscular once  heparin   Injectable 5000 Unit(s) SubCutaneous every 8 hours  hydrALAZINE 100 milliGRAM(s) Oral every 8 hours  insulin glargine Injectable (LANTUS) 33 Unit(s) SubCutaneous at bedtime  insulin lispro (ADMELOG) corrective regimen sliding scale   SubCutaneous three times a day before meals  insulin lispro (ADMELOG) corrective regimen sliding scale   SubCutaneous <User Schedule>  insulin lispro Injectable (ADMELOG) 17 Unit(s) SubCutaneous before lunch  insulin lispro Injectable (ADMELOG) 20 Unit(s) SubCutaneous before breakfast  insulin lispro Injectable (ADMELOG) 17 Unit(s) SubCutaneous before dinner  insulin regular Infusion 3 Unit(s)/Hr IV Continuous <Continuous>  lidocaine   4% Patch 3 Patch Transdermal daily  magnesium oxide 400 milliGRAM(s) Oral three times a day with meals  melatonin 5 milliGRAM(s) Oral at bedtime  mycophenolate mofetil 1000 milliGRAM(s) Oral every 12 hours  NIFEdipine XL 60 milliGRAM(s) Oral every 12 hours  OLANZapine 5 milliGRAM(s) Oral at bedtime  pantoprazole    Tablet 40 milliGRAM(s) Oral every 24 hours  polyethylene glycol 3350 17 Gram(s) Oral daily  predniSONE   Tablet 15 milliGRAM(s) Oral every 12 hours  senna 2 Tablet(s) Oral at bedtime  tacrolimus 6.5 milliGRAM(s) Oral <User Schedule>  trimethoprim   80 mG/sulfamethoxazole 400 mG 1 Tablet(s) Oral daily  valGANciclovir 450 milliGRAM(s) Oral every 12 hours      PMHX/PSHX:  HTN (hypertension)    CAD (coronary artery disease)    Intracranial hemorrhage    Respiratory arrest    Myocardial infarction, unspecified MI type, unspecified artery    History of coronary artery stent placement        Family history:  Family history of meningitis (Sibling)    Family history of hypertension in mother      Social History:     Tob: Denies  EtOH: Denies  Illicit Drugs: Denies    ROS:   General:  No wt loss, fevers, chills, night sweats, fatigue  Eyes:  Good vision, no reported pain  ENT:  No sore throat, pain, runny nose, dysphagia  CV:  No pain, palpitations, hypo/hypertension  Pulm:  No dyspnea, cough, tachypnea, wheezing  GI:  As per HPI  :  No pain, bleeding, incontinence, nocturia  Muscle:  No pain, weakness  Neuro:  No weakness, tingling, memory problems  Psych:  No fatigue, insomnia, mood problems, depression  Endocrine:  No polyuria, polydipsia, cold/heat intolerance  Heme:  No petechiae, ecchymosis, easy bruisability  Skin:  No rash, tattoos, scars, edema    PHYSICAL EXAM:   GENERAL:  uncomfortable appearing  HEENT:  Normocephalic/atraumatic, no scleral icterus  CHEST:  No accessory muscle use  HEART:  Regular rate and rhythm  ABDOMEN:  Soft, non-tender, distended  EXTREMITIES: No cyanosis, clubbing, or edema  SKIN:  No rash  NEURO:  Alert and oriented x 3, no asterixis    Vital Signs:  Vital Signs Last 24 Hrs  T(C): 36.3 (2024 12:00), Max: 36.6 (2024 19:00)  T(F): 97.3 (2024 12:00), Max: 97.9 (2024 19:00)  HR: 94 (2024 17:49) (93 - 103)  BP: 114/75 (2024 17:49) (99/71 - 121/80)  BP(mean): 87 (2024 13:40) (87 - 87)  RR: 18 (2024 12:00) (18 - 18)  SpO2: 99% (2024 12:00) (96% - 99%)    Parameters below as of 2024 12:00  Patient On (Oxygen Delivery Method): room air      Daily     Daily Weight in k.2 (2024 07:21)    LABS:                        10.9   14.17 )-----------( 227      ( 2024 05:47 )             32.7     Mean Cell Volume: 89.1 fl (24 @ 05:47)        125<L>  |  90<L>  |  63<H>  ----------------------------<  228<H>  5.0   |  24  |  1.59<H>    Ca    9.7      2024 05:46    TPro  6.1  /  Alb  3.5  /  TBili  0.2  /  DBili  x   /  AST  23  /  ALT  55<H>  /  AlkPhos  57  01-07    LIVER FUNCTIONS - ( 2024 05:46 )  Alb: 3.5 g/dL / Pro: 6.1 g/dL / ALK PHOS: 57 U/L / ALT: 55 U/L / AST: 23 U/L / GGT: x             Urinalysis Basic - ( 2024 05:46 )    Color: x / Appearance: x / SG: x / pH: x  Gluc: 228 mg/dL / Ketone: x  / Bili: x / Urobili: x   Blood: x / Protein: x / Nitrite: x   Leuk Esterase: x / RBC: x / WBC x   Sq Epi: x / Non Sq Epi: x / Bacteria: x                              10.9   14.17 )-----------( 227      ( 2024 05:47 )             32.7                         11.3   18.24 )-----------( 226      ( 2024 07:16 )             33.3                         11.6   16.03 )-----------( 212      ( 2024 07:06 )             34.1       Imaging:           Chief Complaint:  Patient is a 58y old  Male who presents with a chief complaint of s/p heart transplant (2024 15:14)      HPI:  57 yo M with HFrEF (LVIDd 6.4 cm, LVEF 32%), CAD s/p PCI (), HTN, DMT2 (A1c 8.3%) and CVA s/p TPA (), initially presented to [facility] via EMS after syncope reportedly requiring defibrillation. Treated for ACS but left AMA to come to [facility]. On arrival ECG with ST depression in lateral leads. Intubated  for respiratory failure and was found to have COVID. L/RHC  revealing  of LAD and RCA, elevated filling pressures and CI of 1.5 prompting placement of IABP. Extubated 11/3. IABP was weaned to off , however the following day developed PMVT cardiac arrest with prompt CPR and defibrillation, started on IV Lidocaine/Amio and IABP ultimately replaced on . During this admission was found to be bacteremic for which she was treated for Staph epi, Enterococcus faecalis, Cutibacterium with IV abx.  Was listed Status 2, ABO A, PRA 0% (). A suitable donor was identified and on , he underwent OHT (ischemic Time: 253min, CMV -/+, Toxo -/-). The following day, extubated and IABP removed. Overall progressing well. He underwent RHC/EMBx with revealed elevated filling pressures and normal cardiac output. His diuretics have been increased and he is responding well. He continues to have elevated WBC however remains afebrile and off antibiotics. Currently undergoing infectious work up. Next RHC/EMBx schedule . GI consulted for constipation.     Allergies:  penicillins (Unknown)      Home Medications:    Hospital Medications:  acetaminophen     Tablet .. 650 milliGRAM(s) Oral every 6 hours PRN  budesonide  80 MICROgram(s)/formoterol 4.5 MICROgram(s) Inhaler 1 Puff(s) Inhalation two times a day  chlorhexidine 4% Liquid 1 Application(s) Topical <User Schedule>  dextrose 5%. 1000 milliLiter(s) IV Continuous <Continuous>  dextrose 5%. 1000 milliLiter(s) IV Continuous <Continuous>  dextrose 50% Injectable 50 milliLiter(s) IV Push every 15 minutes  dextrose Oral Gel 15 Gram(s) Oral once PRN  fluconAZOLE   Tablet 100 milliGRAM(s) Oral <User Schedule>  glucagon  Injectable 1 milliGRAM(s) IntraMuscular once  heparin   Injectable 5000 Unit(s) SubCutaneous every 8 hours  hydrALAZINE 100 milliGRAM(s) Oral every 8 hours  insulin glargine Injectable (LANTUS) 33 Unit(s) SubCutaneous at bedtime  insulin lispro (ADMELOG) corrective regimen sliding scale   SubCutaneous three times a day before meals  insulin lispro (ADMELOG) corrective regimen sliding scale   SubCutaneous <User Schedule>  insulin lispro Injectable (ADMELOG) 17 Unit(s) SubCutaneous before lunch  insulin lispro Injectable (ADMELOG) 20 Unit(s) SubCutaneous before breakfast  insulin lispro Injectable (ADMELOG) 17 Unit(s) SubCutaneous before dinner  insulin regular Infusion 3 Unit(s)/Hr IV Continuous <Continuous>  lidocaine   4% Patch 3 Patch Transdermal daily  magnesium oxide 400 milliGRAM(s) Oral three times a day with meals  melatonin 5 milliGRAM(s) Oral at bedtime  mycophenolate mofetil 1000 milliGRAM(s) Oral every 12 hours  NIFEdipine XL 60 milliGRAM(s) Oral every 12 hours  OLANZapine 5 milliGRAM(s) Oral at bedtime  pantoprazole    Tablet 40 milliGRAM(s) Oral every 24 hours  polyethylene glycol 3350 17 Gram(s) Oral daily  predniSONE   Tablet 15 milliGRAM(s) Oral every 12 hours  senna 2 Tablet(s) Oral at bedtime  tacrolimus 6.5 milliGRAM(s) Oral <User Schedule>  trimethoprim   80 mG/sulfamethoxazole 400 mG 1 Tablet(s) Oral daily  valGANciclovir 450 milliGRAM(s) Oral every 12 hours      PMHX/PSHX:  HTN (hypertension)    CAD (coronary artery disease)    Intracranial hemorrhage    Respiratory arrest    Myocardial infarction, unspecified MI type, unspecified artery    History of coronary artery stent placement        Family history:  Family history of meningitis (Sibling)    Family history of hypertension in mother      Social History:     Tob: Denies  EtOH: Denies  Illicit Drugs: Denies    ROS:   General:  No wt loss, fevers, chills, night sweats, fatigue  Eyes:  Good vision, no reported pain  ENT:  No sore throat, pain, runny nose, dysphagia  CV:  No pain, palpitations, hypo/hypertension  Pulm:  No dyspnea, cough, tachypnea, wheezing  GI:  As per HPI  :  No pain, bleeding, incontinence, nocturia  Muscle:  No pain, weakness  Neuro:  No weakness, tingling, memory problems  Psych:  No fatigue, insomnia, mood problems, depression  Endocrine:  No polyuria, polydipsia, cold/heat intolerance  Heme:  No petechiae, ecchymosis, easy bruisability  Skin:  No rash, tattoos, scars, edema    PHYSICAL EXAM:   GENERAL:  uncomfortable appearing  HEENT:  Normocephalic/atraumatic, no scleral icterus  CHEST:  No accessory muscle use  HEART:  Regular rate and rhythm  ABDOMEN:  Soft, non-tender, distended  EXTREMITIES: No cyanosis, clubbing, or edema  SKIN:  No rash  NEURO:  Alert and oriented x 3, no asterixis    Vital Signs:  Vital Signs Last 24 Hrs  T(C): 36.3 (2024 12:00), Max: 36.6 (2024 19:00)  T(F): 97.3 (2024 12:00), Max: 97.9 (2024 19:00)  HR: 94 (2024 17:49) (93 - 103)  BP: 114/75 (2024 17:49) (99/71 - 121/80)  BP(mean): 87 (2024 13:40) (87 - 87)  RR: 18 (2024 12:00) (18 - 18)  SpO2: 99% (2024 12:00) (96% - 99%)    Parameters below as of 2024 12:00  Patient On (Oxygen Delivery Method): room air      Daily     Daily Weight in k.2 (2024 07:21)    LABS:                        10.9   14.17 )-----------( 227      ( 2024 05:47 )             32.7     Mean Cell Volume: 89.1 fl (24 @ 05:47)        125<L>  |  90<L>  |  63<H>  ----------------------------<  228<H>  5.0   |  24  |  1.59<H>    Ca    9.7      2024 05:46    TPro  6.1  /  Alb  3.5  /  TBili  0.2  /  DBili  x   /  AST  23  /  ALT  55<H>  /  AlkPhos  57  -    LIVER FUNCTIONS - ( 2024 05:46 )  Alb: 3.5 g/dL / Pro: 6.1 g/dL / ALK PHOS: 57 U/L / ALT: 55 U/L / AST: 23 U/L / GGT: x             Urinalysis Basic - ( 2024 05:46 )    Color: x / Appearance: x / SG: x / pH: x  Gluc: 228 mg/dL / Ketone: x  / Bili: x / Urobili: x   Blood: x / Protein: x / Nitrite: x   Leuk Esterase: x / RBC: x / WBC x   Sq Epi: x / Non Sq Epi: x / Bacteria: x                              10.9   14.17 )-----------( 227      ( 2024 05:47 )             32.7                         11.3   18.24 )-----------( 226      ( 2024 07:16 )             33.3                         11.6   16.03 )-----------( 212      ( 2024 07:06 )             34.1       Imaging:  < from: CT Abdomen and Pelvis No Cont (24 @ 09:25) >  ACC: [id number] EXAM:  CT CHEST   ORDERED BY: DAMIAN THORNTON     ACC: [id number] EXAM:  CT ABDOMEN AND PELVIS   ORDERED BY: DAMIAN THORNTON     PROCEDURE DATE:  2024          INTERPRETATION:  CLINICAL INFORMATION: Leukocytosis, heart transplant   2023    COMPARISON: CT chest 2023, CT abdomen pelvis 2023    CONTRAST/COMPLICATIONS:  IV Contrast: None  Oral Contrast: NONE  Complications: None reported at time of study completion    PROCEDURE:  CT of the Chest, Abdomen and Pelviswas performed.  Sagittal and coronal reformats were performed.    FINDINGS:  CHEST:  LUNGS AND LARGE AIRWAYS: Patent central airways. Left lower lobe partial   atelectasis.  PLEURA: Trace left pleural effusion.  VESSELS: Within normal limits.  HEART:Heart size is normal. No pericardial effusion.  MEDIASTINUM AND DEE: No lymphadenopathy. Trace air and fluid in the   paracardial space, likely postoperative.  CHEST WALL AND LOWER NECK: Median sternotomy. No fluid collections.    ABDOMEN AND PELVIS:  LIVER: Within normal limits.  BILE DUCTS: Normal caliber.  GALLBLADDER: Within normal limits.  SPLEEN: Within normal limits.  PANCREAS: Within normal limits.  ADRENALS: Within normal limits.  KIDNEYS/URETERS: No hydronephrosis.    BLADDER: Within normal limits.  REPRODUCTIVE ORGANS: Prostate is enlarged.    BOWEL: No bowel obstruction. Mild colonic diverticulosis without   diverticulitis. Appendix is normal. Moderate rectal stool burden. Mild   mural thickening with minimal stranding in the presacral space.  PERITONEUM: No ascites.  VESSELS: Atherosclerotic changes.  RETROPERITONEUM/LYMPH NODES: No lymphadenopathy.  ABDOMINAL WALL: Small fat-containing umbilical hernia.  BONES: Scattered sclerotic lesions, with several demonstrating central  lucency, similar to prior 2023 CTs.    IMPRESSION:  Question mild stercoral colitis.  Expected postoperative changes in the chest. No fluid collection.        --- End of Report ---          ANGELA BLAS MD; Resident Radiologist  This document has been electronically signed.  BONITA SNYDER MD; Attending Radiologist  This document has been electronically signed. 2024 11:01AM    < end of copied text >    < from: Xray Abdomen 1 View PORTABLE -Urgent (Xray Abdomen 1 View PORTABLE -Urgent .) (24 @ 09:17) >  ACC: [id number] EXAM:  XR ABDOMEN PORTABLE URGENT 1V   ORDERED BY: ANDRÉS OCAMPO     PROCEDURE DATE:  2024          INTERPRETATION:  CLINICAL INFORMATION: Stool in colon    EXAM: single frontal view of the abdomen.    COMPARISON: Abdominal x-ray 2024.    FINDINGS:  Single slightly dilated loop of bowel in the left lower quadrant,   nonspecific. Moderate stool burden.  There is no evidence of intraperitoneal free air on this single supine   radiograph.  The visualized osseous structuresdemonstrate no acute pathology.    IMPRESSION:  Single slightly dilated loop of bowel in the left lower quadrant,   nonspecific. Moderate stool burden.    --- End of Report ---           JENNIFER BOCANEGRA MD; Resident Radiologist  This document has been electronically signed.  URIEL HOLDER MD; Attending Radiologist  This document has been electronically signed. 2024 12:13PM    < end of copied text >         Chief Complaint:  Patient is a 58y old  Male who presents with a chief complaint of s/p heart transplant (2024 15:14)      HPI:  59 yo M with HFrEF (LVIDd 6.4 cm, LVEF 32%), CAD s/p PCI (), HTN, DMT2 (A1c 8.3%) and CVA s/p TPA (), initially presented to [facility] via EMS after syncope reportedly requiring defibrillation. Treated for ACS but left AMA to come to [facility]. On arrival ECG with ST depression in lateral leads. Intubated  for respiratory failure and was found to have COVID. L/RHC  revealing  of LAD and RCA, elevated filling pressures and CI of 1.5 prompting placement of IABP. Extubated 11/3. IABP was weaned to off , however the following day developed PMVT cardiac arrest with prompt CPR and defibrillation, started on IV Lidocaine/Amio and IABP ultimately replaced on . During this admission was found to be bacteremic for which she was treated for Staph epi, Enterococcus faecalis, Cutibacterium with IV abx.  Was listed Status 2, ABO A, PRA 0% (). A suitable donor was identified and on , he underwent OHT (ischemic Time: 253min, CMV -/+, Toxo -/-). The following day, extubated and IABP removed. Overall progressing well. He underwent RHC/EMBx with revealed elevated filling pressures and normal cardiac output. His diuretics have been increased and he is responding well. He continues to have elevated WBC however remains afebrile and off antibiotics. Currently undergoing infectious work up. Next RHC/EMBx schedule . GI consulted for constipation.     Allergies:  penicillins (Unknown)      Home Medications:    Hospital Medications:  acetaminophen     Tablet .. 650 milliGRAM(s) Oral every 6 hours PRN  budesonide  80 MICROgram(s)/formoterol 4.5 MICROgram(s) Inhaler 1 Puff(s) Inhalation two times a day  chlorhexidine 4% Liquid 1 Application(s) Topical <User Schedule>  dextrose 5%. 1000 milliLiter(s) IV Continuous <Continuous>  dextrose 5%. 1000 milliLiter(s) IV Continuous <Continuous>  dextrose 50% Injectable 50 milliLiter(s) IV Push every 15 minutes  dextrose Oral Gel 15 Gram(s) Oral once PRN  fluconAZOLE   Tablet 100 milliGRAM(s) Oral <User Schedule>  glucagon  Injectable 1 milliGRAM(s) IntraMuscular once  heparin   Injectable 5000 Unit(s) SubCutaneous every 8 hours  hydrALAZINE 100 milliGRAM(s) Oral every 8 hours  insulin glargine Injectable (LANTUS) 33 Unit(s) SubCutaneous at bedtime  insulin lispro (ADMELOG) corrective regimen sliding scale   SubCutaneous three times a day before meals  insulin lispro (ADMELOG) corrective regimen sliding scale   SubCutaneous <User Schedule>  insulin lispro Injectable (ADMELOG) 17 Unit(s) SubCutaneous before lunch  insulin lispro Injectable (ADMELOG) 20 Unit(s) SubCutaneous before breakfast  insulin lispro Injectable (ADMELOG) 17 Unit(s) SubCutaneous before dinner  insulin regular Infusion 3 Unit(s)/Hr IV Continuous <Continuous>  lidocaine   4% Patch 3 Patch Transdermal daily  magnesium oxide 400 milliGRAM(s) Oral three times a day with meals  melatonin 5 milliGRAM(s) Oral at bedtime  mycophenolate mofetil 1000 milliGRAM(s) Oral every 12 hours  NIFEdipine XL 60 milliGRAM(s) Oral every 12 hours  OLANZapine 5 milliGRAM(s) Oral at bedtime  pantoprazole    Tablet 40 milliGRAM(s) Oral every 24 hours  polyethylene glycol 3350 17 Gram(s) Oral daily  predniSONE   Tablet 15 milliGRAM(s) Oral every 12 hours  senna 2 Tablet(s) Oral at bedtime  tacrolimus 6.5 milliGRAM(s) Oral <User Schedule>  trimethoprim   80 mG/sulfamethoxazole 400 mG 1 Tablet(s) Oral daily  valGANciclovir 450 milliGRAM(s) Oral every 12 hours      PMHX/PSHX:  HTN (hypertension)    CAD (coronary artery disease)    Intracranial hemorrhage    Respiratory arrest    Myocardial infarction, unspecified MI type, unspecified artery    History of coronary artery stent placement        Family history:  Family history of meningitis (Sibling)    Family history of hypertension in mother      Social History:     Tob: Denies  EtOH: Denies  Illicit Drugs: Denies    ROS:   General:  No wt loss, fevers, chills, night sweats, fatigue  Eyes:  Good vision, no reported pain  ENT:  No sore throat, pain, runny nose, dysphagia  CV:  No pain, palpitations, hypo/hypertension  Pulm:  No dyspnea, cough, tachypnea, wheezing  GI:  As per HPI  :  No pain, bleeding, incontinence, nocturia  Muscle:  No pain, weakness  Neuro:  No weakness, tingling, memory problems  Psych:  No fatigue, insomnia, mood problems, depression  Endocrine:  No polyuria, polydipsia, cold/heat intolerance  Heme:  No petechiae, ecchymosis, easy bruisability  Skin:  No rash, tattoos, scars, edema    PHYSICAL EXAM:   GENERAL:  uncomfortable appearing  HEENT:  Normocephalic/atraumatic, no scleral icterus  CHEST:  No accessory muscle use  HEART:  Regular rate and rhythm  ABDOMEN:  Soft, non-tender, distended  EXTREMITIES: No cyanosis, clubbing, or edema  SKIN:  No rash  NEURO:  Alert and oriented x 3, no asterixis    Vital Signs:  Vital Signs Last 24 Hrs  T(C): 36.3 (2024 12:00), Max: 36.6 (2024 19:00)  T(F): 97.3 (2024 12:00), Max: 97.9 (2024 19:00)  HR: 94 (2024 17:49) (93 - 103)  BP: 114/75 (2024 17:49) (99/71 - 121/80)  BP(mean): 87 (2024 13:40) (87 - 87)  RR: 18 (2024 12:00) (18 - 18)  SpO2: 99% (2024 12:00) (96% - 99%)    Parameters below as of 2024 12:00  Patient On (Oxygen Delivery Method): room air      Daily     Daily Weight in k.2 (2024 07:21)    LABS:                        10.9   14.17 )-----------( 227      ( 2024 05:47 )             32.7     Mean Cell Volume: 89.1 fl (24 @ 05:47)        125<L>  |  90<L>  |  63<H>  ----------------------------<  228<H>  5.0   |  24  |  1.59<H>    Ca    9.7      2024 05:46    TPro  6.1  /  Alb  3.5  /  TBili  0.2  /  DBili  x   /  AST  23  /  ALT  55<H>  /  AlkPhos  57  -    LIVER FUNCTIONS - ( 2024 05:46 )  Alb: 3.5 g/dL / Pro: 6.1 g/dL / ALK PHOS: 57 U/L / ALT: 55 U/L / AST: 23 U/L / GGT: x             Urinalysis Basic - ( 2024 05:46 )    Color: x / Appearance: x / SG: x / pH: x  Gluc: 228 mg/dL / Ketone: x  / Bili: x / Urobili: x   Blood: x / Protein: x / Nitrite: x   Leuk Esterase: x / RBC: x / WBC x   Sq Epi: x / Non Sq Epi: x / Bacteria: x                              10.9   14.17 )-----------( 227      ( 2024 05:47 )             32.7                         11.3   18.24 )-----------( 226      ( 2024 07:16 )             33.3                         11.6   16.03 )-----------( 212      ( 2024 07:06 )             34.1       Imaging:  < from: CT Abdomen and Pelvis No Cont (24 @ 09:25) >  ACC: [id number] EXAM:  CT CHEST   ORDERED BY: DAMIAN THORNTON     ACC: [id number] EXAM:  CT ABDOMEN AND PELVIS   ORDERED BY: ADMIAN THORNTON     PROCEDURE DATE:  2024          INTERPRETATION:  CLINICAL INFORMATION: Leukocytosis, heart transplant   2023    COMPARISON: CT chest 2023, CT abdomen pelvis 2023    CONTRAST/COMPLICATIONS:  IV Contrast: None  Oral Contrast: NONE  Complications: None reported at time of study completion    PROCEDURE:  CT of the Chest, Abdomen and Pelviswas performed.  Sagittal and coronal reformats were performed.    FINDINGS:  CHEST:  LUNGS AND LARGE AIRWAYS: Patent central airways. Left lower lobe partial   atelectasis.  PLEURA: Trace left pleural effusion.  VESSELS: Within normal limits.  HEART:Heart size is normal. No pericardial effusion.  MEDIASTINUM AND DEE: No lymphadenopathy. Trace air and fluid in the   paracardial space, likely postoperative.  CHEST WALL AND LOWER NECK: Median sternotomy. No fluid collections.    ABDOMEN AND PELVIS:  LIVER: Within normal limits.  BILE DUCTS: Normal caliber.  GALLBLADDER: Within normal limits.  SPLEEN: Within normal limits.  PANCREAS: Within normal limits.  ADRENALS: Within normal limits.  KIDNEYS/URETERS: No hydronephrosis.    BLADDER: Within normal limits.  REPRODUCTIVE ORGANS: Prostate is enlarged.    BOWEL: No bowel obstruction. Mild colonic diverticulosis without   diverticulitis. Appendix is normal. Moderate rectal stool burden. Mild   mural thickening with minimal stranding in the presacral space.  PERITONEUM: No ascites.  VESSELS: Atherosclerotic changes.  RETROPERITONEUM/LYMPH NODES: No lymphadenopathy.  ABDOMINAL WALL: Small fat-containing umbilical hernia.  BONES: Scattered sclerotic lesions, with several demonstrating central  lucency, similar to prior 2023 CTs.    IMPRESSION:  Question mild stercoral colitis.  Expected postoperative changes in the chest. No fluid collection.        --- End of Report ---          ANGELA BLAS MD; Resident Radiologist  This document has been electronically signed.  BONITA SNYDER MD; Attending Radiologist  This document has been electronically signed. 2024 11:01AM    < end of copied text >    < from: Xray Abdomen 1 View PORTABLE -Urgent (Xray Abdomen 1 View PORTABLE -Urgent .) (24 @ 09:17) >  ACC: [id number] EXAM:  XR ABDOMEN PORTABLE URGENT 1V   ORDERED BY: ANDRÉS OCAMPO     PROCEDURE DATE:  2024          INTERPRETATION:  CLINICAL INFORMATION: Stool in colon    EXAM: single frontal view of the abdomen.    COMPARISON: Abdominal x-ray 2024.    FINDINGS:  Single slightly dilated loop of bowel in the left lower quadrant,   nonspecific. Moderate stool burden.  There is no evidence of intraperitoneal free air on this single supine   radiograph.  The visualized osseous structuresdemonstrate no acute pathology.    IMPRESSION:  Single slightly dilated loop of bowel in the left lower quadrant,   nonspecific. Moderate stool burden.    --- End of Report ---           JENNIFER BOCANEGRA MD; Resident Radiologist  This document has been electronically signed.  URIEL HOLDER MD; Attending Radiologist  This document has been electronically signed. 2024 12:13PM    < end of copied text >         Chief Complaint:  Patient is a 58y old  Male who presents with a chief complaint of s/p heart transplant (2024 15:14)      HPI:  59 yo M with HFrEF (LVIDd 6.4 cm, LVEF 32%), CAD s/p PCI (), HTN, DMT2 (A1c 8.3%) and CVA s/p TPA (), initially presented to [facility] via EMS after syncope reportedly requiring defibrillation. Treated for ACS but left AMA to come to [facility]. On arrival ECG with ST depression in lateral leads. Intubated  for respiratory failure and was found to have COVID. L/RHC  revealing  of LAD and RCA, elevated filling pressures and CI of 1.5 prompting placement of IABP. Extubated 11/3. IABP was weaned to off , however the following day developed PMVT cardiac arrest with prompt CPR and defibrillation, started on IV Lidocaine/Amio and IABP ultimately replaced on . During this admission was found to be bacteremic for which she was treated for Staph epi, Enterococcus faecalis, Cutibacterium with IV abx.  Was listed Status 2, ABO A, PRA 0% (). A suitable donor was identified and on , he underwent OHT (ischemic Time: 253min, CMV -/+, Toxo -/-). The following day, extubated and IABP removed. Overall progressing well. He underwent RHC/EMBx with revealed elevated filling pressures and normal cardiac output. His diuretics have been increased and he is responding well. He continues to have elevated WBC however remains afebrile and off antibiotics. Currently undergoing infectious work up. Next RHC/EMBx schedule . GI consulted for constipation.     Allergies:  penicillins (Unknown)      Home Medications:    Hospital Medications:  acetaminophen     Tablet .. 650 milliGRAM(s) Oral every 6 hours PRN  budesonide  80 MICROgram(s)/formoterol 4.5 MICROgram(s) Inhaler 1 Puff(s) Inhalation two times a day  chlorhexidine 4% Liquid 1 Application(s) Topical <User Schedule>  dextrose 5%. 1000 milliLiter(s) IV Continuous <Continuous>  dextrose 5%. 1000 milliLiter(s) IV Continuous <Continuous>  dextrose 50% Injectable 50 milliLiter(s) IV Push every 15 minutes  dextrose Oral Gel 15 Gram(s) Oral once PRN  fluconAZOLE   Tablet 100 milliGRAM(s) Oral <User Schedule>  glucagon  Injectable 1 milliGRAM(s) IntraMuscular once  heparin   Injectable 5000 Unit(s) SubCutaneous every 8 hours  hydrALAZINE 100 milliGRAM(s) Oral every 8 hours  insulin glargine Injectable (LANTUS) 33 Unit(s) SubCutaneous at bedtime  insulin lispro (ADMELOG) corrective regimen sliding scale   SubCutaneous three times a day before meals  insulin lispro (ADMELOG) corrective regimen sliding scale   SubCutaneous <User Schedule>  insulin lispro Injectable (ADMELOG) 17 Unit(s) SubCutaneous before lunch  insulin lispro Injectable (ADMELOG) 20 Unit(s) SubCutaneous before breakfast  insulin lispro Injectable (ADMELOG) 17 Unit(s) SubCutaneous before dinner  insulin regular Infusion 3 Unit(s)/Hr IV Continuous <Continuous>  lidocaine   4% Patch 3 Patch Transdermal daily  magnesium oxide 400 milliGRAM(s) Oral three times a day with meals  melatonin 5 milliGRAM(s) Oral at bedtime  mycophenolate mofetil 1000 milliGRAM(s) Oral every 12 hours  NIFEdipine XL 60 milliGRAM(s) Oral every 12 hours  OLANZapine 5 milliGRAM(s) Oral at bedtime  pantoprazole    Tablet 40 milliGRAM(s) Oral every 24 hours  polyethylene glycol 3350 17 Gram(s) Oral daily  predniSONE   Tablet 15 milliGRAM(s) Oral every 12 hours  senna 2 Tablet(s) Oral at bedtime  tacrolimus 6.5 milliGRAM(s) Oral <User Schedule>  trimethoprim   80 mG/sulfamethoxazole 400 mG 1 Tablet(s) Oral daily  valGANciclovir 450 milliGRAM(s) Oral every 12 hours      PMHX/PSHX:  HTN (hypertension)    CAD (coronary artery disease)    Intracranial hemorrhage    Respiratory arrest    Myocardial infarction, unspecified MI type, unspecified artery    History of coronary artery stent placement        Family history:  Family history of meningitis (Sibling)    Family history of hypertension in mother      Social History:     Tob: Denies  EtOH: Denies  Illicit Drugs: Denies    ROS:   General:  No wt loss, fevers, chills, night sweats, fatigue  Eyes:  Good vision, no reported pain  ENT:  No sore throat, pain, runny nose, dysphagia  CV:  No pain, palpitations, hypo/hypertension  Pulm:  No dyspnea, cough, tachypnea, wheezing  GI:  As per HPI  :  No pain, bleeding, incontinence, nocturia  Muscle:  No pain, weakness  Neuro:  No weakness, tingling, memory problems  Psych:  No fatigue, insomnia, mood problems, depression  Endocrine:  No polyuria, polydipsia, cold/heat intolerance  Heme:  No petechiae, ecchymosis, easy bruisability  Skin:  No rash, tattoos, scars, edema    PHYSICAL EXAM:   GENERAL:  uncomfortable appearing  HEENT:  Normocephalic/atraumatic, no scleral icterus  CHEST:  No accessory muscle use  HEART:  Regular rate and rhythm  ABDOMEN:  Soft, non-tender, distended  EXTREMITIES: No cyanosis, clubbing, or edema  SKIN:  No rash  NEURO:  Alert and oriented x 3, no asterixis    Vital Signs:  Vital Signs Last 24 Hrs  T(C): 36.3 (2024 12:00), Max: 36.6 (2024 19:00)  T(F): 97.3 (2024 12:00), Max: 97.9 (2024 19:00)  HR: 94 (2024 17:49) (93 - 103)  BP: 114/75 (2024 17:49) (99/71 - 121/80)  BP(mean): 87 (2024 13:40) (87 - 87)  RR: 18 (2024 12:00) (18 - 18)  SpO2: 99% (2024 12:00) (96% - 99%)    Parameters below as of 2024 12:00  Patient On (Oxygen Delivery Method): room air      Daily     Daily Weight in k.2 (2024 07:21)    LABS:                        10.9   14.17 )-----------( 227      ( 2024 05:47 )             32.7     Mean Cell Volume: 89.1 fl (24 @ 05:47)        125<L>  |  90<L>  |  63<H>  ----------------------------<  228<H>  5.0   |  24  |  1.59<H>    Ca    9.7      2024 05:46    TPro  6.1  /  Alb  3.5  /  TBili  0.2  /  DBili  x   /  AST  23  /  ALT  55<H>  /  AlkPhos  57  -    LIVER FUNCTIONS - ( 2024 05:46 )  Alb: 3.5 g/dL / Pro: 6.1 g/dL / ALK PHOS: 57 U/L / ALT: 55 U/L / AST: 23 U/L / GGT: x             Urinalysis Basic - ( 2024 05:46 )    Color: x / Appearance: x / SG: x / pH: x  Gluc: 228 mg/dL / Ketone: x  / Bili: x / Urobili: x   Blood: x / Protein: x / Nitrite: x   Leuk Esterase: x / RBC: x / WBC x   Sq Epi: x / Non Sq Epi: x / Bacteria: x                              10.9   14.17 )-----------( 227      ( 2024 05:47 )             32.7                         11.3   18.24 )-----------( 226      ( 2024 07:16 )             33.3                         11.6   16.03 )-----------( 212      ( 2024 07:06 )             34.1       Imaging:  < from: CT Abdomen and Pelvis No Cont (24 @ 09:25) >  ACC: [id number] EXAM:  CT CHEST   ORDERED BY: DAMIAN THORNTON     ACC: [id number] EXAM:  CT ABDOMEN AND PELVIS   ORDERED BY: DAMIAN THORNTON     PROCEDURE DATE:  2024          INTERPRETATION:  CLINICAL INFORMATION: Leukocytosis, heart transplant   2023    COMPARISON: CT chest 2023, CT abdomen pelvis 2023    CONTRAST/COMPLICATIONS:  IV Contrast: None  Oral Contrast: NONE  Complications: None reported at time of study completion    PROCEDURE:  CT of the Chest, Abdomen and Pelviswas performed.  Sagittal and coronal reformats were performed.    FINDINGS:  CHEST:  LUNGS AND LARGE AIRWAYS: Patent central airways. Left lower lobe partial   atelectasis.  PLEURA: Trace left pleural effusion.  VESSELS: Within normal limits.  HEART:Heart size is normal. No pericardial effusion.  MEDIASTINUM AND DEE: No lymphadenopathy. Trace air and fluid in the   paracardial space, likely postoperative.  CHEST WALL AND LOWER NECK: Median sternotomy. No fluid collections.    ABDOMEN AND PELVIS:  LIVER: Within normal limits.  BILE DUCTS: Normal caliber.  GALLBLADDER: Within normal limits.  SPLEEN: Within normal limits.  PANCREAS: Within normal limits.  ADRENALS: Within normal limits.  KIDNEYS/URETERS: No hydronephrosis.    BLADDER: Within normal limits.  REPRODUCTIVE ORGANS: Prostate is enlarged.    BOWEL: No bowel obstruction. Mild colonic diverticulosis without   diverticulitis. Appendix is normal. Moderate rectal stool burden. Mild   mural thickening with minimal stranding in the presacral space.  PERITONEUM: No ascites.  VESSELS: Atherosclerotic changes.  RETROPERITONEUM/LYMPH NODES: No lymphadenopathy.  ABDOMINAL WALL: Small fat-containing umbilical hernia.  BONES: Scattered sclerotic lesions, with several demonstrating central  lucency, similar to prior 2023 CTs.    IMPRESSION:  Question mild stercoral colitis.  Expected postoperative changes in the chest. No fluid collection.        --- End of Report ---          ANGELA BLAS MD; Resident Radiologist  This document has been electronically signed.  BONITA SNYDER MD; Attending Radiologist  This document has been electronically signed. 2024 11:01AM    < end of copied text >    < from: Xray Abdomen 1 View PORTABLE -Urgent (Xray Abdomen 1 View PORTABLE -Urgent .) (24 @ 09:17) >  ACC: [id number] EXAM:  XR ABDOMEN PORTABLE URGENT 1V   ORDERED BY: ANDRÉS OCAMPO     PROCEDURE DATE:  2024          INTERPRETATION:  CLINICAL INFORMATION: Stool in colon    EXAM: single frontal view of the abdomen.    COMPARISON: Abdominal x-ray 2024.    FINDINGS:  Single slightly dilated loop of bowel in the left lower quadrant,   nonspecific. Moderate stool burden.  There is no evidence of intraperitoneal free air on this single supine   radiograph.  The visualized osseous structuresdemonstrate no acute pathology.    IMPRESSION:  Single slightly dilated loop of bowel in the left lower quadrant,   nonspecific. Moderate stool burden.    --- End of Report ---           JENNIFER BOCANEGRA MD; Resident Radiologist  This document has been electronically signed.  URIEL HOLDER MD; Attending Radiologist  This document has been electronically signed. 2024 12:13PM    < end of copied text >      < from: Colonoscopy (23 @ 10:35) >  The quality of the bowelpreparation was fair.                                                                                                        Findings:       The perianal and digital rectal examinations were normal.       The entire examined colon appeared normal on direct and retroflexion views.       No polyps detected in entirety of exam though bowel prep is fair.                                                                                                        Impression:          - The entire examined colon is normal on direct and retroflexion views.                       - No specimens collected.    < end of copied text >     Chief Complaint:  Patient is a 58y old  Male who presents with a chief complaint of s/p heart transplant (2024 15:14)      HPI:  57 yo M with HFrEF (LVIDd 6.4 cm, LVEF 32%), CAD s/p PCI (), HTN, DMT2 (A1c 8.3%) and CVA s/p TPA (), initially presented to [facility] via EMS after syncope reportedly requiring defibrillation. Treated for ACS but left AMA to come [facility]. On arrival ECG with ST depression in lateral leads. Intubated  for respiratory failure and was found to have COVID. L/RHC  revealing  of LAD and RCA, elevated filling pressures and CI of 1.5 prompting placement of IABP. Extubated 11/3. IABP was weaned to off , however the following day developed PMVT cardiac arrest with prompt CPR and defibrillation, started on IV Lidocaine/Amio and IABP ultimately replaced on . During this admission was found to be bacteremic for which she was treated for Staph epi, Enterococcus faecalis, Cutibacterium with IV abx.  Was listed Status 2, ABO A, PRA 0% (). A suitable donor was identified and on , he underwent OHT (ischemic Time: 253min, CMV -/+, Toxo -/-). The following day, extubated and IABP removed. Overall progressing well. He underwent RHC/EMBx with revealed elevated filling pressures and normal cardiac output. His diuretics have been increased and he is responding well. He continues to have elevated WBC however remains afebrile and off antibiotics. Currently undergoing infectious work up. Next RHC/EMBx schedule . GI consulted for constipation.     Allergies:  penicillins (Unknown)      Home Medications:    Hospital Medications:  acetaminophen     Tablet .. 650 milliGRAM(s) Oral every 6 hours PRN  budesonide  80 MICROgram(s)/formoterol 4.5 MICROgram(s) Inhaler 1 Puff(s) Inhalation two times a day  chlorhexidine 4% Liquid 1 Application(s) Topical <User Schedule>  dextrose 5%. 1000 milliLiter(s) IV Continuous <Continuous>  dextrose 5%. 1000 milliLiter(s) IV Continuous <Continuous>  dextrose 50% Injectable 50 milliLiter(s) IV Push every 15 minutes  dextrose Oral Gel 15 Gram(s) Oral once PRN  fluconAZOLE   Tablet 100 milliGRAM(s) Oral <User Schedule>  glucagon  Injectable 1 milliGRAM(s) IntraMuscular once  heparin   Injectable 5000 Unit(s) SubCutaneous every 8 hours  hydrALAZINE 100 milliGRAM(s) Oral every 8 hours  insulin glargine Injectable (LANTUS) 33 Unit(s) SubCutaneous at bedtime  insulin lispro (ADMELOG) corrective regimen sliding scale   SubCutaneous three times a day before meals  insulin lispro (ADMELOG) corrective regimen sliding scale   SubCutaneous <User Schedule>  insulin lispro Injectable (ADMELOG) 17 Unit(s) SubCutaneous before lunch  insulin lispro Injectable (ADMELOG) 20 Unit(s) SubCutaneous before breakfast  insulin lispro Injectable (ADMELOG) 17 Unit(s) SubCutaneous before dinner  insulin regular Infusion 3 Unit(s)/Hr IV Continuous <Continuous>  lidocaine   4% Patch 3 Patch Transdermal daily  magnesium oxide 400 milliGRAM(s) Oral three times a day with meals  melatonin 5 milliGRAM(s) Oral at bedtime  mycophenolate mofetil 1000 milliGRAM(s) Oral every 12 hours  NIFEdipine XL 60 milliGRAM(s) Oral every 12 hours  OLANZapine 5 milliGRAM(s) Oral at bedtime  pantoprazole    Tablet 40 milliGRAM(s) Oral every 24 hours  polyethylene glycol 3350 17 Gram(s) Oral daily  predniSONE   Tablet 15 milliGRAM(s) Oral every 12 hours  senna 2 Tablet(s) Oral at bedtime  tacrolimus 6.5 milliGRAM(s) Oral <User Schedule>  trimethoprim   80 mG/sulfamethoxazole 400 mG 1 Tablet(s) Oral daily  valGANciclovir 450 milliGRAM(s) Oral every 12 hours      PMHX/PSHX:  HTN (hypertension)    CAD (coronary artery disease)    Intracranial hemorrhage    Respiratory arrest    Myocardial infarction, unspecified MI type, unspecified artery    History of coronary artery stent placement        Family history:  Family history of meningitis (Sibling)    Family history of hypertension in mother      Social History:     Tob: Denies  EtOH: Denies  Illicit Drugs: Denies    ROS:   General:  No wt loss, fevers, chills, night sweats, fatigue  Eyes:  Good vision, no reported pain  ENT:  No sore throat, pain, runny nose, dysphagia  CV:  No pain, palpitations, hypo/hypertension  Pulm:  No dyspnea, cough, tachypnea, wheezing  GI:  As per HPI  :  No pain, bleeding, incontinence, nocturia  Muscle:  No pain, weakness  Neuro:  No weakness, tingling, memory problems  Psych:  No fatigue, insomnia, mood problems, depression  Endocrine:  No polyuria, polydipsia, cold/heat intolerance  Heme:  No petechiae, ecchymosis, easy bruisability  Skin:  No rash, tattoos, scars, edema    PHYSICAL EXAM:   GENERAL:  uncomfortable appearing  HEENT:  Normocephalic/atraumatic, no scleral icterus  CHEST:  No accessory muscle use  HEART:  Regular rate and rhythm  ABDOMEN:  Soft, non-tender, distended  EXTREMITIES: No cyanosis, clubbing, or edema  SKIN:  No rash  NEURO:  Alert and oriented x 3, no asterixis    Vital Signs:  Vital Signs Last 24 Hrs  T(C): 36.3 (2024 12:00), Max: 36.6 (2024 19:00)  T(F): 97.3 (2024 12:00), Max: 97.9 (2024 19:00)  HR: 94 (2024 17:49) (93 - 103)  BP: 114/75 (2024 17:49) (99/71 - 121/80)  BP(mean): 87 (2024 13:40) (87 - 87)  RR: 18 (2024 12:00) (18 - 18)  SpO2: 99% (2024 12:00) (96% - 99%)    Parameters below as of 2024 12:00  Patient On (Oxygen Delivery Method): room air      Daily     Daily Weight in k.2 (2024 07:21)    LABS:                        10.9   14.17 )-----------( 227      ( 2024 05:47 )             32.7     Mean Cell Volume: 89.1 fl (24 @ 05:47)        125<L>  |  90<L>  |  63<H>  ----------------------------<  228<H>  5.0   |  24  |  1.59<H>    Ca    9.7      2024 05:46    TPro  6.1  /  Alb  3.5  /  TBili  0.2  /  DBili  x   /  AST  23  /  ALT  55<H>  /  AlkPhos  57  -    LIVER FUNCTIONS - ( 2024 05:46 )  Alb: 3.5 g/dL / Pro: 6.1 g/dL / ALK PHOS: 57 U/L / ALT: 55 U/L / AST: 23 U/L / GGT: x             Urinalysis Basic - ( 2024 05:46 )    Color: x / Appearance: x / SG: x / pH: x  Gluc: 228 mg/dL / Ketone: x  / Bili: x / Urobili: x   Blood: x / Protein: x / Nitrite: x   Leuk Esterase: x / RBC: x / WBC x   Sq Epi: x / Non Sq Epi: x / Bacteria: x                              10.9   14.17 )-----------( 227      ( 2024 05:47 )             32.7                         11.3   18.24 )-----------( 226      ( 2024 07:16 )             33.3                         11.6   16.03 )-----------( 212      ( 2024 07:06 )             34.1       Imaging:  < from: CT Abdomen and Pelvis No Cont (24 @ 09:25) >  ACC: [id number] EXAM:  CT CHEST   ORDERED BY: DAMIAN THORNTON     ACC: [id number] EXAM:  CT ABDOMEN AND PELVIS   ORDERED BY: DAMIAN THORNTON     PROCEDURE DATE:  2024          INTERPRETATION:  CLINICAL INFORMATION: Leukocytosis, heart transplant   2023    COMPARISON: CT chest 2023, CT abdomen pelvis 2023    CONTRAST/COMPLICATIONS:  IV Contrast: None  Oral Contrast: NONE  Complications: None reported at time of study completion    PROCEDURE:  CT of the Chest, Abdomen and Pelviswas performed.  Sagittal and coronal reformats were performed.    FINDINGS:  CHEST:  LUNGS AND LARGE AIRWAYS: Patent central airways. Left lower lobe partial   atelectasis.  PLEURA: Trace left pleural effusion.  VESSELS: Within normal limits.  HEART:Heart size is normal. No pericardial effusion.  MEDIASTINUM AND DEE: No lymphadenopathy. Trace air and fluid in the   paracardial space, likely postoperative.  CHEST WALL AND LOWER NECK: Median sternotomy. No fluid collections.    ABDOMEN AND PELVIS:  LIVER: Within normal limits.  BILE DUCTS: Normal caliber.  GALLBLADDER: Within normal limits.  SPLEEN: Within normal limits.  PANCREAS: Within normal limits.  ADRENALS: Within normal limits.  KIDNEYS/URETERS: No hydronephrosis.    BLADDER: Within normal limits.  REPRODUCTIVE ORGANS: Prostate is enlarged.    BOWEL: No bowel obstruction. Mild colonic diverticulosis without   diverticulitis. Appendix is normal. Moderate rectal stool burden. Mild   mural thickening with minimal stranding in the presacral space.  PERITONEUM: No ascites.  VESSELS: Atherosclerotic changes.  RETROPERITONEUM/LYMPH NODES: No lymphadenopathy.  ABDOMINAL WALL: Small fat-containing umbilical hernia.  BONES: Scattered sclerotic lesions, with several demonstrating central  lucency, similar to prior 2023 CTs.    IMPRESSION:  Question mild stercoral colitis.  Expected postoperative changes in the chest. No fluid collection.        --- End of Report ---          ANGELA BLAS MD; Resident Radiologist  This document has been electronically signed.  BONITA SNYDER MD; Attending Radiologist  This document has been electronically signed. 2024 11:01AM    < end of copied text >    < from: Xray Abdomen 1 View PORTABLE -Urgent (Xray Abdomen 1 View PORTABLE -Urgent .) (24 @ 09:17) >  ACC: [id number] EXAM:  XR ABDOMEN PORTABLE URGENT 1V   ORDERED BY: ANDRÉS OCAMPO     PROCEDURE DATE:  2024          INTERPRETATION:  CLINICAL INFORMATION: Stool in colon    EXAM: single frontal view of the abdomen.    COMPARISON: Abdominal x-ray 2024.    FINDINGS:  Single slightly dilated loop of bowel in the left lower quadrant,   nonspecific. Moderate stool burden.  There is no evidence of intraperitoneal free air on this single supine   radiograph.  The visualized osseous structuresdemonstrate no acute pathology.    IMPRESSION:  Single slightly dilated loop of bowel in the left lower quadrant,   nonspecific. Moderate stool burden.    --- End of Report ---           JENNIFER BOCANEGRA MD; Resident Radiologist  This document has been electronically signed.  URIEL HOLDER MD; Attending Radiologist  This document has been electronically signed. 2024 12:13PM    < end of copied text >      < from: Colonoscopy (23 @ 10:35) >  The quality of the bowelpreparation was fair.                                                                                                        Findings:       The perianal and digital rectal examinations were normal.       The entire examined colon appeared normal on direct and retroflexion views.       No polyps detected in entirety of exam though bowel prep is fair.                                                                                                        Impression:          - The entire examined colon is normal on direct and retroflexion views.                       - No specimens collected.    < end of copied text >

## 2024-01-07 NOTE — PROGRESS NOTE ADULT - SUBJECTIVE AND OBJECTIVE BOX
ID: 59 yo M with HFrEF (LVIDd 6.4 cm, LVEF 32%), CAD s/p PCI (2008), HTN, DMT2 (A1c 8.3%) and CVA s/p TPA (2018), recently treated for PNA who initially presented to [facility] via EMS after syncope reportedly requiring defibrillation. Treated for ACS but left AMA to come to [facility]. On arrival ECG with ST depression in lateral leads. Intubated 11/1 for respiratory failure and was found to have COVID. L/RHC 11/1revealing  of LAD and RCA, elevated filling pressures and CI of 1.5 prompting placement of IABP. Extubated 11/3. IABP was weaned to off 11/7, however the following day developed PMVT cardiac arrest with prompt CPR and defibrillation, started on IV Lidocaine/Amio and IABP ultimately replaced on 11/9. During this admission was found to be bacteremic for which she was treated for Staph epi, Enterococcus faecalis, Cutibacterium with IV abx.  Was listed Status 2, ABO A, PRA 0% (12/12). A suitable donor was identified and on 12/25, he underwent OHT (ischemic Time: 253min, CMV -/+, Toxo -/-). The following day, extubated and IABP removed. Overall progressing well. He underwent RHC/EMBx with revealed elevated filling pressures and normal cardiac output. His diuretics have been increased and he is responding well. He continues to have elevated WBC however remains afebrile and off antibiotics. Currently undergoing infectious work up. Next RHC/EMBx schedule Tuesday 1/9      Patient seen and examined at bedside.    Overnight Events: MAJO, still no BMs, started on golytely, has not ambulated yet today. Cr uptrending and somewhat dry on exam. Explained to pt that he can drink liberally (pt thought he was still water restricted post OHT). Will plan to dec bumex to 2 QDay and encourage OOB/ambulation.     Review of Systems: Negative except as per HPI     Current Meds:  acetaminophen     Tablet .. 650 milliGRAM(s) Oral every 6 hours PRN  acetaminophen   IVPB .. 1000 milliGRAM(s) IV Intermittent once  budesonide  80 MICROgram(s)/formoterol 4.5 MICROgram(s) Inhaler 1 Puff(s) Inhalation two times a day  buMETAnide 2 milliGRAM(s) Oral every 12 hours  chlorhexidine 4% Liquid 1 Application(s) Topical <User Schedule>  dextrose 5%. 1000 milliLiter(s) IV Continuous <Continuous>  dextrose 5%. 1000 milliLiter(s) IV Continuous <Continuous>  dextrose 50% Injectable 50 milliLiter(s) IV Push every 15 minutes  dextrose Oral Gel 15 Gram(s) Oral once PRN  fluconAZOLE   Tablet 100 milliGRAM(s) Oral <User Schedule>  glucagon  Injectable 1 milliGRAM(s) IntraMuscular once  heparin   Injectable 5000 Unit(s) SubCutaneous every 8 hours  hydrALAZINE 100 milliGRAM(s) Oral every 8 hours  insulin glargine Injectable (LANTUS) 33 Unit(s) SubCutaneous at bedtime  insulin lispro (ADMELOG) corrective regimen sliding scale   SubCutaneous <User Schedule>  insulin lispro (ADMELOG) corrective regimen sliding scale   SubCutaneous three times a day before meals  insulin lispro Injectable (ADMELOG) 20 Unit(s) SubCutaneous before breakfast  insulin lispro Injectable (ADMELOG) 17 Unit(s) SubCutaneous before dinner  insulin lispro Injectable (ADMELOG) 17 Unit(s) SubCutaneous before lunch  insulin regular Infusion 3 Unit(s)/Hr IV Continuous <Continuous>  lidocaine   4% Patch 3 Patch Transdermal daily  magnesium oxide 400 milliGRAM(s) Oral three times a day with meals  melatonin 5 milliGRAM(s) Oral at bedtime  mycophenolate mofetil 1000 milliGRAM(s) Oral every 12 hours  NIFEdipine XL 60 milliGRAM(s) Oral every 12 hours  OLANZapine 5 milliGRAM(s) Oral at bedtime  pantoprazole    Tablet 40 milliGRAM(s) Oral every 24 hours  polyethylene glycol 3350 17 Gram(s) Oral daily  polyethylene glycol/electrolyte Solution. 1000 milliLiter(s) Oral once  predniSONE   Tablet 15 milliGRAM(s) Oral every 12 hours  senna 2 Tablet(s) Oral at bedtime  tacrolimus 6.5 milliGRAM(s) Oral <User Schedule>  trimethoprim   80 mG/sulfamethoxazole 400 mG 1 Tablet(s) Oral daily  valGANciclovir 450 milliGRAM(s) Oral every 12 hours      Vitals:  T(F): 97.9 (01-07), Max: 97.9 (01-06)  HR: 93 (01-07) (90 - 103)  BP: 121/80 (01-07) (104/74 - 121/80)  RR: 18 (01-07)  SpO2: 96% (01-07)  I&O's Summary    06 Jan 2024 07:01  -  07 Jan 2024 07:00  --------------------------------------------------------  IN: 900 mL / OUT: 1100 mL / NET: -200 mL        Physical Exam:  Appearance: No acute distress, well appearing   Eyes: PERRL, EOMI, pink conjunctiva  HEENT: Normal oral mucosa  Cardiovascular: S/p sternotomy, RRR, S1, S2, no murmurs, rubs, or gallops; no edema; no JVD  Respiratory: Clear to auscultation bilaterally  Gastrointestinal: soft, non-tender, non-distended with normal bowel sounds  Musculoskeletal: No clubbing; no joint deformity   Neurologic: Non-focal  Lymphatic: No lymphadenopathy  Psychiatry: AAOx3, mood & affect appropriate  Skin: No rashes, ecchymoses, or cyanosis                            10.9   14.17 )-----------( 227      ( 07 Jan 2024 05:47 )             32.7     01-07    125<L>  |  90<L>  |  63<H>  ----------------------------<  228<H>  5.0   |  24  |  1.59<H>    Ca    9.7      07 Jan 2024 05:46    TPro  6.1  /  Alb  3.5  /  TBili  0.2  /  DBili  x   /  AST  23  /  ALT  55<H>  /  AlkPhos  57  01-07      New ECG(s): Personally reviewed       ID: 57 yo M with HFrEF (LVIDd 6.4 cm, LVEF 32%), CAD s/p PCI (2008), HTN, DMT2 (A1c 8.3%) and CVA s/p TPA (2018), recently treated for PNA who initially presented to [facility] via EMS after syncope reportedly requiring defibrillation. Treated for ACS but left AMA to come to [facility]. On arrival ECG with ST depression in lateral leads. Intubated 11/1 for respiratory failure and was found to have COVID. L/RHC 11/1revealing  of LAD and RCA, elevated filling pressures and CI of 1.5 prompting placement of IABP. Extubated 11/3. IABP was weaned to off 11/7, however the following day developed PMVT cardiac arrest with prompt CPR and defibrillation, started on IV Lidocaine/Amio and IABP ultimately replaced on 11/9. During this admission was found to be bacteremic for which she was treated for Staph epi, Enterococcus faecalis, Cutibacterium with IV abx.  Was listed Status 2, ABO A, PRA 0% (12/12). A suitable donor was identified and on 12/25, he underwent OHT (ischemic Time: 253min, CMV -/+, Toxo -/-). The following day, extubated and IABP removed. Overall progressing well. He underwent RHC/EMBx with revealed elevated filling pressures and normal cardiac output. His diuretics have been increased and he is responding well. He continues to have elevated WBC however remains afebrile and off antibiotics. Currently undergoing infectious work up. Next RHC/EMBx schedule Tuesday 1/9      Patient seen and examined at bedside.    Overnight Events: MAJO, still no BMs, started on golytely, has not ambulated yet today. Cr uptrending and somewhat dry on exam. Explained to pt that he can drink liberally (pt thought he was still water restricted post OHT). Will plan to dec bumex to 2 QDay and encourage OOB/ambulation.     Review of Systems: Negative except as per HPI     Current Meds:  acetaminophen     Tablet .. 650 milliGRAM(s) Oral every 6 hours PRN  acetaminophen   IVPB .. 1000 milliGRAM(s) IV Intermittent once  budesonide  80 MICROgram(s)/formoterol 4.5 MICROgram(s) Inhaler 1 Puff(s) Inhalation two times a day  buMETAnide 2 milliGRAM(s) Oral every 12 hours  chlorhexidine 4% Liquid 1 Application(s) Topical <User Schedule>  dextrose 5%. 1000 milliLiter(s) IV Continuous <Continuous>  dextrose 5%. 1000 milliLiter(s) IV Continuous <Continuous>  dextrose 50% Injectable 50 milliLiter(s) IV Push every 15 minutes  dextrose Oral Gel 15 Gram(s) Oral once PRN  fluconAZOLE   Tablet 100 milliGRAM(s) Oral <User Schedule>  glucagon  Injectable 1 milliGRAM(s) IntraMuscular once  heparin   Injectable 5000 Unit(s) SubCutaneous every 8 hours  hydrALAZINE 100 milliGRAM(s) Oral every 8 hours  insulin glargine Injectable (LANTUS) 33 Unit(s) SubCutaneous at bedtime  insulin lispro (ADMELOG) corrective regimen sliding scale   SubCutaneous <User Schedule>  insulin lispro (ADMELOG) corrective regimen sliding scale   SubCutaneous three times a day before meals  insulin lispro Injectable (ADMELOG) 20 Unit(s) SubCutaneous before breakfast  insulin lispro Injectable (ADMELOG) 17 Unit(s) SubCutaneous before dinner  insulin lispro Injectable (ADMELOG) 17 Unit(s) SubCutaneous before lunch  insulin regular Infusion 3 Unit(s)/Hr IV Continuous <Continuous>  lidocaine   4% Patch 3 Patch Transdermal daily  magnesium oxide 400 milliGRAM(s) Oral three times a day with meals  melatonin 5 milliGRAM(s) Oral at bedtime  mycophenolate mofetil 1000 milliGRAM(s) Oral every 12 hours  NIFEdipine XL 60 milliGRAM(s) Oral every 12 hours  OLANZapine 5 milliGRAM(s) Oral at bedtime  pantoprazole    Tablet 40 milliGRAM(s) Oral every 24 hours  polyethylene glycol 3350 17 Gram(s) Oral daily  polyethylene glycol/electrolyte Solution. 1000 milliLiter(s) Oral once  predniSONE   Tablet 15 milliGRAM(s) Oral every 12 hours  senna 2 Tablet(s) Oral at bedtime  tacrolimus 6.5 milliGRAM(s) Oral <User Schedule>  trimethoprim   80 mG/sulfamethoxazole 400 mG 1 Tablet(s) Oral daily  valGANciclovir 450 milliGRAM(s) Oral every 12 hours      Vitals:  T(F): 97.9 (01-07), Max: 97.9 (01-06)  HR: 93 (01-07) (90 - 103)  BP: 121/80 (01-07) (104/74 - 121/80)  RR: 18 (01-07)  SpO2: 96% (01-07)  I&O's Summary    06 Jan 2024 07:01  -  07 Jan 2024 07:00  --------------------------------------------------------  IN: 900 mL / OUT: 1100 mL / NET: -200 mL        Physical Exam:  Appearance: No acute distress, well appearing   Eyes: PERRL, EOMI, pink conjunctiva  HEENT: Normal oral mucosa  Cardiovascular: S/p sternotomy, RRR, S1, S2, no murmurs, rubs, or gallops; no edema; no JVD  Respiratory: Clear to auscultation bilaterally  Gastrointestinal: soft, non-tender, non-distended with normal bowel sounds  Musculoskeletal: No clubbing; no joint deformity   Neurologic: Non-focal  Lymphatic: No lymphadenopathy  Psychiatry: AAOx3, mood & affect appropriate  Skin: No rashes, ecchymoses, or cyanosis                            10.9   14.17 )-----------( 227      ( 07 Jan 2024 05:47 )             32.7     01-07    125<L>  |  90<L>  |  63<H>  ----------------------------<  228<H>  5.0   |  24  |  1.59<H>    Ca    9.7      07 Jan 2024 05:46    TPro  6.1  /  Alb  3.5  /  TBili  0.2  /  DBili  x   /  AST  23  /  ALT  55<H>  /  AlkPhos  57  01-07      New ECG(s): Personally reviewed       ID: 57 yo M with HFrEF (LVIDd 6.4 cm, LVEF 32%), CAD s/p PCI (2008), HTN, DMT2 (A1c 8.3%) and CVA s/p TPA (2018), recently treated for PNA who initially presented to [facility] via EMS after syncope reportedly requiring defibrillation. Treated for ACS but left AMA to come to [facility]. On arrival ECG with ST depression in lateral leads. Intubated 11/1 for respiratory failure and was found to have COVID. L/RHC 11/1revealing  of LAD and RCA, elevated filling pressures and CI of 1.5 prompting placement of IABP. Extubated 11/3. IABP was weaned to off 11/7, however the following day developed PMVT cardiac arrest with prompt CPR and defibrillation, started on IV Lidocaine/Amio and IABP ultimately replaced on 11/9. During this admission was found to be bacteremic for which she was treated for Staph epi, Enterococcus faecalis, Cutibacterium with IV abx.  Was listed Status 2, ABO A, PRA 0% (12/12). A suitable donor was identified and on 12/25, he underwent OHT (ischemic Time: 253min, CMV -/+, Toxo -/-). The following day, extubated and IABP removed. Overall progressing well. He underwent RHC/EMBx with revealed elevated filling pressures and normal cardiac output. His diuretics have been increased and he is responding well. He continues to have elevated WBC however remains afebrile and off antibiotics. Currently undergoing infectious work up. Next RHC/EMBx schedule Tuesday 1/9      Patient seen and examined at bedside.    Overnight Events: MAJO, still no BMs, started on golytely, has not ambulated yet today. Cr uptrending and somewhat dry on exam. Explained to pt that he can drink liberally (pt thought he was still water restricted post OHT). Will plan to hold bumex and encourage OOB/ambulation.     Review of Systems: Negative except as per HPI     Current Meds:  acetaminophen     Tablet .. 650 milliGRAM(s) Oral every 6 hours PRN  acetaminophen   IVPB .. 1000 milliGRAM(s) IV Intermittent once  budesonide  80 MICROgram(s)/formoterol 4.5 MICROgram(s) Inhaler 1 Puff(s) Inhalation two times a day  buMETAnide 2 milliGRAM(s) Oral every 12 hours  chlorhexidine 4% Liquid 1 Application(s) Topical <User Schedule>  dextrose 5%. 1000 milliLiter(s) IV Continuous <Continuous>  dextrose 5%. 1000 milliLiter(s) IV Continuous <Continuous>  dextrose 50% Injectable 50 milliLiter(s) IV Push every 15 minutes  dextrose Oral Gel 15 Gram(s) Oral once PRN  fluconAZOLE   Tablet 100 milliGRAM(s) Oral <User Schedule>  glucagon  Injectable 1 milliGRAM(s) IntraMuscular once  heparin   Injectable 5000 Unit(s) SubCutaneous every 8 hours  hydrALAZINE 100 milliGRAM(s) Oral every 8 hours  insulin glargine Injectable (LANTUS) 33 Unit(s) SubCutaneous at bedtime  insulin lispro (ADMELOG) corrective regimen sliding scale   SubCutaneous <User Schedule>  insulin lispro (ADMELOG) corrective regimen sliding scale   SubCutaneous three times a day before meals  insulin lispro Injectable (ADMELOG) 20 Unit(s) SubCutaneous before breakfast  insulin lispro Injectable (ADMELOG) 17 Unit(s) SubCutaneous before dinner  insulin lispro Injectable (ADMELOG) 17 Unit(s) SubCutaneous before lunch  insulin regular Infusion 3 Unit(s)/Hr IV Continuous <Continuous>  lidocaine   4% Patch 3 Patch Transdermal daily  magnesium oxide 400 milliGRAM(s) Oral three times a day with meals  melatonin 5 milliGRAM(s) Oral at bedtime  mycophenolate mofetil 1000 milliGRAM(s) Oral every 12 hours  NIFEdipine XL 60 milliGRAM(s) Oral every 12 hours  OLANZapine 5 milliGRAM(s) Oral at bedtime  pantoprazole    Tablet 40 milliGRAM(s) Oral every 24 hours  polyethylene glycol 3350 17 Gram(s) Oral daily  polyethylene glycol/electrolyte Solution. 1000 milliLiter(s) Oral once  predniSONE   Tablet 15 milliGRAM(s) Oral every 12 hours  senna 2 Tablet(s) Oral at bedtime  tacrolimus 6.5 milliGRAM(s) Oral <User Schedule>  trimethoprim   80 mG/sulfamethoxazole 400 mG 1 Tablet(s) Oral daily  valGANciclovir 450 milliGRAM(s) Oral every 12 hours      Vitals:  T(F): 97.9 (01-07), Max: 97.9 (01-06)  HR: 93 (01-07) (90 - 103)  BP: 121/80 (01-07) (104/74 - 121/80)  RR: 18 (01-07)  SpO2: 96% (01-07)  I&O's Summary    06 Jan 2024 07:01  -  07 Jan 2024 07:00  --------------------------------------------------------  IN: 900 mL / OUT: 1100 mL / NET: -200 mL        Physical Exam:  Appearance: No acute distress, well appearing   Eyes: PERRL, EOMI, pink conjunctiva  HEENT: Normal oral mucosa  Cardiovascular: S/p sternotomy, RRR, S1, S2, no murmurs, rubs, or gallops; no edema; no JVD  Respiratory: Clear to auscultation bilaterally  Gastrointestinal: soft, non-tender, non-distended with normal bowel sounds  Musculoskeletal: No clubbing; no joint deformity   Neurologic: Non-focal  Lymphatic: No lymphadenopathy  Psychiatry: AAOx3, mood & affect appropriate  Skin: No rashes, ecchymoses, or cyanosis                            10.9   14.17 )-----------( 227      ( 07 Jan 2024 05:47 )             32.7     01-07    125<L>  |  90<L>  |  63<H>  ----------------------------<  228<H>  5.0   |  24  |  1.59<H>    Ca    9.7      07 Jan 2024 05:46    TPro  6.1  /  Alb  3.5  /  TBili  0.2  /  DBili  x   /  AST  23  /  ALT  55<H>  /  AlkPhos  57  01-07      New ECG(s): Personally reviewed       ID: 57 yo M with HFrEF (LVIDd 6.4 cm, LVEF 32%), CAD s/p PCI (2008), HTN, DMT2 (A1c 8.3%) and CVA s/p TPA (2018), recently treated for PNA who initially presented to [facility] via EMS after syncope reportedly requiring defibrillation. Treated for ACS but left AMA to come to [facility]. On arrival ECG with ST depression in lateral leads. Intubated 11/1 for respiratory failure and was found to have COVID. L/RHC 11/1revealing  of LAD and RCA, elevated filling pressures and CI of 1.5 prompting placement of IABP. Extubated 11/3. IABP was weaned to off 11/7, however the following day developed PMVT cardiac arrest with prompt CPR and defibrillation, started on IV Lidocaine/Amio and IABP ultimately replaced on 11/9. During this admission was found to be bacteremic for which she was treated for Staph epi, Enterococcus faecalis, Cutibacterium with IV abx.  Was listed Status 2, ABO A, PRA 0% (12/12). A suitable donor was identified and on 12/25, he underwent OHT (ischemic Time: 253min, CMV -/+, Toxo -/-). The following day, extubated and IABP removed. Overall progressing well. He underwent RHC/EMBx with revealed elevated filling pressures and normal cardiac output. His diuretics have been increased and he is responding well. He continues to have elevated WBC however remains afebrile and off antibiotics. Currently undergoing infectious work up. Next RHC/EMBx schedule Tuesday 1/9      Patient seen and examined at bedside.    Overnight Events: MAJO, still no BMs, started on golytely, has not ambulated yet today. Cr uptrending and somewhat dry on exam. Explained to pt that he can drink liberally (pt thought he was still water restricted post OHT). Will plan to hold bumex and encourage OOB/ambulation.     Review of Systems: Negative except as per HPI     Current Meds:  acetaminophen     Tablet .. 650 milliGRAM(s) Oral every 6 hours PRN  acetaminophen   IVPB .. 1000 milliGRAM(s) IV Intermittent once  budesonide  80 MICROgram(s)/formoterol 4.5 MICROgram(s) Inhaler 1 Puff(s) Inhalation two times a day  buMETAnide 2 milliGRAM(s) Oral every 12 hours  chlorhexidine 4% Liquid 1 Application(s) Topical <User Schedule>  dextrose 5%. 1000 milliLiter(s) IV Continuous <Continuous>  dextrose 5%. 1000 milliLiter(s) IV Continuous <Continuous>  dextrose 50% Injectable 50 milliLiter(s) IV Push every 15 minutes  dextrose Oral Gel 15 Gram(s) Oral once PRN  fluconAZOLE   Tablet 100 milliGRAM(s) Oral <User Schedule>  glucagon  Injectable 1 milliGRAM(s) IntraMuscular once  heparin   Injectable 5000 Unit(s) SubCutaneous every 8 hours  hydrALAZINE 100 milliGRAM(s) Oral every 8 hours  insulin glargine Injectable (LANTUS) 33 Unit(s) SubCutaneous at bedtime  insulin lispro (ADMELOG) corrective regimen sliding scale   SubCutaneous <User Schedule>  insulin lispro (ADMELOG) corrective regimen sliding scale   SubCutaneous three times a day before meals  insulin lispro Injectable (ADMELOG) 20 Unit(s) SubCutaneous before breakfast  insulin lispro Injectable (ADMELOG) 17 Unit(s) SubCutaneous before dinner  insulin lispro Injectable (ADMELOG) 17 Unit(s) SubCutaneous before lunch  insulin regular Infusion 3 Unit(s)/Hr IV Continuous <Continuous>  lidocaine   4% Patch 3 Patch Transdermal daily  magnesium oxide 400 milliGRAM(s) Oral three times a day with meals  melatonin 5 milliGRAM(s) Oral at bedtime  mycophenolate mofetil 1000 milliGRAM(s) Oral every 12 hours  NIFEdipine XL 60 milliGRAM(s) Oral every 12 hours  OLANZapine 5 milliGRAM(s) Oral at bedtime  pantoprazole    Tablet 40 milliGRAM(s) Oral every 24 hours  polyethylene glycol 3350 17 Gram(s) Oral daily  polyethylene glycol/electrolyte Solution. 1000 milliLiter(s) Oral once  predniSONE   Tablet 15 milliGRAM(s) Oral every 12 hours  senna 2 Tablet(s) Oral at bedtime  tacrolimus 6.5 milliGRAM(s) Oral <User Schedule>  trimethoprim   80 mG/sulfamethoxazole 400 mG 1 Tablet(s) Oral daily  valGANciclovir 450 milliGRAM(s) Oral every 12 hours      Vitals:  T(F): 97.9 (01-07), Max: 97.9 (01-06)  HR: 93 (01-07) (90 - 103)  BP: 121/80 (01-07) (104/74 - 121/80)  RR: 18 (01-07)  SpO2: 96% (01-07)  I&O's Summary    06 Jan 2024 07:01  -  07 Jan 2024 07:00  --------------------------------------------------------  IN: 900 mL / OUT: 1100 mL / NET: -200 mL        Physical Exam:  Appearance: No acute distress, well appearing   Eyes: PERRL, EOMI, pink conjunctiva  HEENT: Normal oral mucosa  Cardiovascular: S/p sternotomy, RRR, S1, S2, no murmurs, rubs, or gallops; no edema; no JVD  Respiratory: Clear to auscultation bilaterally  Gastrointestinal: soft, non-tender, non-distended with normal bowel sounds  Musculoskeletal: No clubbing; no joint deformity   Neurologic: Non-focal  Lymphatic: No lymphadenopathy  Psychiatry: AAOx3, mood & affect appropriate  Skin: No rashes, ecchymoses, or cyanosis                            10.9   14.17 )-----------( 227      ( 07 Jan 2024 05:47 )             32.7     01-07    125<L>  |  90<L>  |  63<H>  ----------------------------<  228<H>  5.0   |  24  |  1.59<H>    Ca    9.7      07 Jan 2024 05:46    TPro  6.1  /  Alb  3.5  /  TBili  0.2  /  DBili  x   /  AST  23  /  ALT  55<H>  /  AlkPhos  57  01-07      New ECG(s): Personally reviewed

## 2024-01-07 NOTE — PROGRESS NOTE ADULT - PROBLEM SELECTOR PLAN 5
Mg citrate 1 bottle yesterday  rectal--no stool vault  enema x 1  Abd XR ordered  Golytely today, if no bm then another enema

## 2024-01-07 NOTE — CONSULT NOTE ADULT - CONSULT REQUESTED DATE/TIME
05-Dec-2023 12:00
13-Nov-2023
02-Nov-2023 15:48
09-Nov-2023
13-Nov-2023 17:36
18-Nov-2023 16:01
18-Nov-2023 21:39
02-Nov-2023 07:42
14-Nov-2023 20:32
15-Nov-2023 14:37
26-Dec-2023 19:38
14-Nov-2023 12:47
14-Nov-2023 15:28
20-Dec-2023
07-Jan-2024 18:51
01-Nov-2023 20:30
01-Nov-2023 12:21

## 2024-01-07 NOTE — PROGRESS NOTE ADULT - PROBLEM SELECTOR PLAN 1
- IABP removed 12/26  - Sabrina and Epi weaned off 12/26  - Procardia XL 60 mg BID  - 1st RHC/EMBx on Tuesday 1/2  - DC plan Home Monday

## 2024-01-07 NOTE — CONSULT NOTE ADULT - CONSULT REASON
Colon cancer screening
LVAD/transplant eval
constipation
ADHF
VT/VF arrest
dental optimization prior to LVAD/heart transplant procedure
pulmonary evaluation prior to evaluation for advanced cardiac therapies
Psychological assessment as part of heart transplant/LVAD evaluation
heart transplant/LVAD eval
Rash
code shock
pneumonia
Diabetes
ARIC
L neck collection
chest pain
LVAD/heart transplant evaluation

## 2024-01-07 NOTE — CONSULT NOTE ADULT - ASSESSMENT
57yo M w/ extended hospital course s/p OHT 12/25 who developed severe constipation.    #Constipation  *S/p digital disimpaction with large/hard stool burden relieved, patient found some relief    Recommendations:  -provide 4L Golytely to patient to sip on tonight and through out the day tomorrow, no time limit on finishing  -would hold the above if patient develops nausea and or vomiting  -encourage patient to ambulate when ready  -BiD or TiD tap water enemas, as patient will allow to help soften stool  -may require further disimpaction by primary team, patient agreeable if needed      All recommendations preliminary until note signed by service attending.    Thank you for involving us in the care of this patient. Please contact should any concern or questions arise.    Enoc Morrison MD   Gastroenterology/Hepatology Fellow PGY-5  Available on [employer] Teams 7am - 5pm  [email or web address]    NON-URGENT CONSULTS:  Please email:  [email or web address]   OR  [email or web address]    After 5pm, please contact the on-call GI fellow. [phone]    AT NIGHT AND ON WEEKENDS:  Contact on-call GI fellow via answering service ([phone]) from 5pm-8am and on weekends/holidays   59yo M w/ extended hospital course s/p OHT 12/25 who developed severe constipation.    #Constipation  *S/p digital disimpaction with large/hard stool burden relieved, patient found some relief    Recommendations:  -provide 4L Golytely to patient to sip on tonight and through out the day tomorrow, no time limit on finishing  -would hold the above if patient develops nausea and or vomiting  -encourage patient to ambulate when ready  -BiD or TiD tap water enemas, as patient will allow to help soften stool  -may require further disimpaction by primary team, patient agreeable if needed      All recommendations preliminary until note signed by service attending.    Thank you for involving us in the care of this patient. Please contact should any concern or questions arise.    Enoc Morrison MD   Gastroenterology/Hepatology Fellow PGY-5  Available on [employer] Teams 7am - 5pm  [email or web address]    NON-URGENT CONSULTS:  Please email:  [email or web address]   OR  [email or web address]    After 5pm, please contact the on-call GI fellow. [phone]    AT NIGHT AND ON WEEKENDS:  Contact on-call GI fellow via answering service ([phone]) from 5pm-8am and on weekends/holidays   57yo M w/ extended hospital course s/p OHT 12/25 who developed severe constipation.    #Constipation  *S/p digital disimpaction with large/hard stool burden relieved, patient found some relief    Recommendations:  -provide 4L Golytely to patient to sip on tonight and through out the day tomorrow, no time limit on finishing  -would hold the above if patient develops nausea and or vomiting  -encourage patient to ambulate when ready  -BiD or TiD tap water enemas, as patient will allow to help soften stool  -may require further disimpaction by primary team, patient agreeable if needed      All recommendations preliminary until note signed by service attending.    Thank you for involving us in the care of this patient. Please contact should any concern or questions arise.    Enoc Morrison MD   Gastroenterology/Hepatology Fellow PGY-5  Available on [employer] Teams 7am - 5pm  [email or web address]    NON-URGENT CONSULTS:  Please email:  [email or web address]       After 5pm, please contact the on-call GI fellow. [phone]    AT NIGHT AND ON WEEKENDS:  Contact on-call GI fellow via answering service ([phone]) from 5pm-8am and on weekends/holidays   57yo M w/ extended hospital course s/p OHT 12/25 who developed severe constipation.    #Constipation  *S/p digital disimpaction with large/hard stool burden relieved, patient found some relief    Recommendations:  -provide 4L Golytely to patient to sip on tonight and through out the day tomorrow, no time limit on finishing  -would hold the above if patient develops nausea and or vomiting  -encourage patient to ambulate when ready  -BiD or TiD tap water enemas, as patient will allow to help soften stool  -may require further disimpaction by primary team, patient agreeable if needed      All recommendations preliminary until note signed by service attending.    Thank you for involving us in the care of this patient. Please contact should any concern or questions arise.    Enoc Morrison MD   Gastroenterology/Hepatology Fellow PGY-5  Available on [employer] Teams 7am - 5pm  [email or web address]    NON-URGENT CONSULTS:  Please email:  [email or web address]       After 5pm, please contact the on-call GI fellow. [phone]    AT NIGHT AND ON WEEKENDS:  Contact on-call GI fellow via answering service ([phone]) from 5pm-8am and on weekends/holidays

## 2024-01-07 NOTE — PROGRESS NOTE ADULT - PROBLEM SELECTOR PLAN 2
- cardiorenal following  - pre transplant had baseline Cr 1.2, peaked to 4, now at relative baseline  - Continue diuresis on Bumex 2mg BID - cardiorenal following  - pre transplant had baseline Cr 1.2, peaked to 4, now at relative baseline  - Bumex on hold 1/7  - Trend NA, BUN/Cr on daily CMP

## 2024-01-07 NOTE — PROGRESS NOTE ADULT - SUBJECTIVE AND OBJECTIVE BOX
Subjective: Pt states "Hello" denies any CP or SOB. No acute events overnight.     Telemetry:  SR 80 - 100  Vital Signs Last 24 Hrs  T(C): 36.6 (01-07-24 @ 04:05), Max: 36.6 (01-06-24 @ 19:00)  T(F): 97.9 (01-07-24 @ 04:05), Max: 97.9 (01-06-24 @ 19:00)  HR: 93 (01-07-24 @ 04:05) (90 - 103)  BP: 121/80 (01-07-24 @ 04:05) (101/72 - 121/80)  RR: 18 (01-07-24 @ 04:05) (18 - 18)  SpO2: 96% (01-07-24 @ 04:05) (96% - 98%)             01-06 @ 07:01  -  01-07 @ 07:00  --------------------------------------------------------  IN: 900 mL / OUT: 1100 mL / NET: -200 mL                            10.9   14.17 )-----------( 227      ( 07 Jan 2024 05:47 )             32.7     125<L>  |  90<L>  |  63<H>  ----------------------------<  228<H>  5.0   |  24  |  1.59<H>    AST  23  /  ALT  55<H>  /  AlkPhos  57  01-07          CAPILLARY BLOOD GLUCOSE  278 - 281      PHYSICAL EXAM  Neurology: A&Ox3, NAD  CV : RRR+S1S2  Sternal Wound: MSI CDI RUBA, Stable  Lungs: Respirations non-labored, B/L BS CTA  Abdomen: Soft, NT/ND, +BSx4Q, neg BM  (-)N/V/D  : Voiding without difficulty  Extremities: B/L LE no edema, negative calf tenderness, +PP             MEDICATIONS  acetaminophen     Tablet .. 650 milliGRAM(s) Oral every 6 hours PRN  acetaminophen   IVPB .. 1000 milliGRAM(s) IV Intermittent once  budesonide  80 MICROgram(s)/formoterol 4.5 MICROgram(s) Inhaler 1 Puff(s) Inhalation two times a day  buMETAnide 2 milliGRAM(s) Oral every 12 hours  chlorhexidine 4% Liquid 1 Application(s) Topical <User Schedule>  fluconAZOLE   Tablet 100 milliGRAM(s) Oral <User Schedule>  glucagon  Injectable 1 milliGRAM(s) IntraMuscular once  heparin   Injectable 5000 Unit(s) SubCutaneous every 8 hours  hydrALAZINE 100 milliGRAM(s) Oral every 8 hours  insulin glargine Injectable (LANTUS) 33 Unit(s) SubCutaneous at bedtime  insulin lispro (ADMELOG) corrective regimen sliding scale   SubCutaneous three times a day before meals  insulin lispro (ADMELOG) corrective regimen sliding scale   SubCutaneous <User Schedule>  insulin lispro Injectable (ADMELOG) 17 Unit(s) SubCutaneous before lunch  insulin lispro Injectable (ADMELOG) 20 Unit(s) SubCutaneous before breakfast  insulin lispro Injectable (ADMELOG) 17 Unit(s) SubCutaneous before dinner  lidocaine   4% Patch 3 Patch Transdermal daily  magnesium oxide 400 milliGRAM(s) Oral three times a day with meals  melatonin 5 milliGRAM(s) Oral at bedtime  mycophenolate mofetil 1000 milliGRAM(s) Oral every 12 hours  NIFEdipine XL 60 milliGRAM(s) Oral every 12 hours  OLANZapine 5 milliGRAM(s) Oral at bedtime  pantoprazole    Tablet 40 milliGRAM(s) Oral every 24 hours  polyethylene glycol 3350 17 Gram(s) Oral daily  polyethylene glycol/electrolyte Solution. 1000 milliLiter(s) Oral once  predniSONE   Tablet 15 milliGRAM(s) Oral every 12 hours  senna 2 Tablet(s) Oral at bedtime  tacrolimus 6.5 milliGRAM(s) Oral <User Schedule>  trimethoprim   80 mG/sulfamethoxazole 400 mG 1 Tablet(s) Oral daily  valGANciclovir 450 milliGRAM(s) Oral every 12 hours      Physical Therapy Rec:   Home  [  ]   Home w/ PT  [  ]  Rehab  [  ]    Discussed with Cardiothoracic Team at AM rounds.

## 2024-01-07 NOTE — PROGRESS NOTE ADULT - PROBLEM SELECTOR PLAN 1
- s/p OHT on 12/25. Ischemic Time: 253min  - IABP removed 12/26  - continue with Procardia XL 60 mg BID and hydralazine 100 mg PO TID  - Dec Bumex to 2 PO QDay (1/7) **  - underwent RHC/EMBx on 1/2, Grade 0R  - next RHC/EMBx schedule for Tuesday 1/9  - Please keep primary Dr. Banuelos [phone] in the loop per family request. - s/p OHT on 12/25. Ischemic Time: 253min  - IABP removed 12/26  - continue with Procardia XL 60 mg BID and hydralazine 100 mg PO TID  - Dec Bumex to 2 PO QDay (1/7) **  - underwent RHC/EMBx on 1/2, Grade 0R  - next RHC/EMBx schedule for Tuesday 1/9  - Please keep primary Dr. Banuelos [phone] in the loop per family request. - s/p OHT on 12/25. Ischemic Time: 253min  - IABP removed 12/26  - continue with Procardia XL 60 mg BID and hydralazine 100 mg PO TID  - Hold Bumex and reassess  - underwent RHC/EMBx on 1/2, Grade 0R  - next RHC/EMBx schedule for Tuesday 1/9  - Please keep primary Dr. Banuelos [phone] in the loop per family request. - s/p OHT on 12/25. Ischemic Time: 253min  - IABP removed 12/26  - continue with Procardia XL 60 mg BID and hydralazine 100 mg PO TID  - Hold Bumex and reassess  - underwent RHC/EMBx on 1/2, Grade 0R  - next RHC/EMBx schedule for Tuesday 1/9  - Please keep primary Dr. Banuelos [phone] in the loop per family request.

## 2024-01-08 LAB
ALBUMIN SERPL ELPH-MCNC: 3.9 G/DL — SIGNIFICANT CHANGE UP (ref 3.3–5)
ALBUMIN SERPL ELPH-MCNC: 3.9 G/DL — SIGNIFICANT CHANGE UP (ref 3.3–5)
ALP SERPL-CCNC: 58 U/L — SIGNIFICANT CHANGE UP (ref 40–120)
ALP SERPL-CCNC: 58 U/L — SIGNIFICANT CHANGE UP (ref 40–120)
ALT FLD-CCNC: 48 U/L — HIGH (ref 10–45)
ALT FLD-CCNC: 48 U/L — HIGH (ref 10–45)
ANION GAP SERPL CALC-SCNC: 20 MMOL/L — HIGH (ref 5–17)
ANION GAP SERPL CALC-SCNC: 20 MMOL/L — HIGH (ref 5–17)
AST SERPL-CCNC: 24 U/L — SIGNIFICANT CHANGE UP (ref 10–40)
AST SERPL-CCNC: 24 U/L — SIGNIFICANT CHANGE UP (ref 10–40)
BILIRUB SERPL-MCNC: 0.4 MG/DL — SIGNIFICANT CHANGE UP (ref 0.2–1.2)
BILIRUB SERPL-MCNC: 0.4 MG/DL — SIGNIFICANT CHANGE UP (ref 0.2–1.2)
BUN SERPL-MCNC: 57 MG/DL — HIGH (ref 7–23)
BUN SERPL-MCNC: 57 MG/DL — HIGH (ref 7–23)
CALCIUM SERPL-MCNC: 9.5 MG/DL — SIGNIFICANT CHANGE UP (ref 8.4–10.5)
CALCIUM SERPL-MCNC: 9.5 MG/DL — SIGNIFICANT CHANGE UP (ref 8.4–10.5)
CHLORIDE SERPL-SCNC: 89 MMOL/L — LOW (ref 96–108)
CHLORIDE SERPL-SCNC: 89 MMOL/L — LOW (ref 96–108)
CO2 SERPL-SCNC: 24 MMOL/L — SIGNIFICANT CHANGE UP (ref 22–31)
CO2 SERPL-SCNC: 24 MMOL/L — SIGNIFICANT CHANGE UP (ref 22–31)
CREAT SERPL-MCNC: 1.43 MG/DL — HIGH (ref 0.5–1.3)
CREAT SERPL-MCNC: 1.43 MG/DL — HIGH (ref 0.5–1.3)
CULTURE RESULTS: SIGNIFICANT CHANGE UP
EGFR: 56 ML/MIN/1.73M2 — LOW
EGFR: 56 ML/MIN/1.73M2 — LOW
GLUCOSE BLDC GLUCOMTR-MCNC: 116 MG/DL — HIGH (ref 70–99)
GLUCOSE BLDC GLUCOMTR-MCNC: 116 MG/DL — HIGH (ref 70–99)
GLUCOSE BLDC GLUCOMTR-MCNC: 141 MG/DL — HIGH (ref 70–99)
GLUCOSE BLDC GLUCOMTR-MCNC: 141 MG/DL — HIGH (ref 70–99)
GLUCOSE BLDC GLUCOMTR-MCNC: 171 MG/DL — HIGH (ref 70–99)
GLUCOSE BLDC GLUCOMTR-MCNC: 171 MG/DL — HIGH (ref 70–99)
GLUCOSE BLDC GLUCOMTR-MCNC: 176 MG/DL — HIGH (ref 70–99)
GLUCOSE BLDC GLUCOMTR-MCNC: 176 MG/DL — HIGH (ref 70–99)
GLUCOSE BLDC GLUCOMTR-MCNC: 212 MG/DL — HIGH (ref 70–99)
GLUCOSE BLDC GLUCOMTR-MCNC: 212 MG/DL — HIGH (ref 70–99)
GLUCOSE SERPL-MCNC: 133 MG/DL — HIGH (ref 70–99)
GLUCOSE SERPL-MCNC: 133 MG/DL — HIGH (ref 70–99)
HCT VFR BLD CALC: 33.1 % — LOW (ref 39–50)
HCT VFR BLD CALC: 33.1 % — LOW (ref 39–50)
HGB BLD-MCNC: 11.5 G/DL — LOW (ref 13–17)
HGB BLD-MCNC: 11.5 G/DL — LOW (ref 13–17)
MAGNESIUM SERPL-MCNC: 2.3 MG/DL — SIGNIFICANT CHANGE UP (ref 1.6–2.6)
MAGNESIUM SERPL-MCNC: 2.3 MG/DL — SIGNIFICANT CHANGE UP (ref 1.6–2.6)
MCHC RBC-ENTMCNC: 30.3 PG — SIGNIFICANT CHANGE UP (ref 27–34)
MCHC RBC-ENTMCNC: 30.3 PG — SIGNIFICANT CHANGE UP (ref 27–34)
MCHC RBC-ENTMCNC: 34.7 GM/DL — SIGNIFICANT CHANGE UP (ref 32–36)
MCHC RBC-ENTMCNC: 34.7 GM/DL — SIGNIFICANT CHANGE UP (ref 32–36)
MCV RBC AUTO: 87.3 FL — SIGNIFICANT CHANGE UP (ref 80–100)
MCV RBC AUTO: 87.3 FL — SIGNIFICANT CHANGE UP (ref 80–100)
NRBC # BLD: 0 /100 WBCS — SIGNIFICANT CHANGE UP (ref 0–0)
NRBC # BLD: 0 /100 WBCS — SIGNIFICANT CHANGE UP (ref 0–0)
PHOSPHATE SERPL-MCNC: 4.4 MG/DL — SIGNIFICANT CHANGE UP (ref 2.5–4.5)
PHOSPHATE SERPL-MCNC: 4.4 MG/DL — SIGNIFICANT CHANGE UP (ref 2.5–4.5)
PLATELET # BLD AUTO: 232 K/UL — SIGNIFICANT CHANGE UP (ref 150–400)
PLATELET # BLD AUTO: 232 K/UL — SIGNIFICANT CHANGE UP (ref 150–400)
POTASSIUM SERPL-MCNC: 4.2 MMOL/L — SIGNIFICANT CHANGE UP (ref 3.5–5.3)
POTASSIUM SERPL-MCNC: 4.2 MMOL/L — SIGNIFICANT CHANGE UP (ref 3.5–5.3)
POTASSIUM SERPL-SCNC: 4.2 MMOL/L — SIGNIFICANT CHANGE UP (ref 3.5–5.3)
POTASSIUM SERPL-SCNC: 4.2 MMOL/L — SIGNIFICANT CHANGE UP (ref 3.5–5.3)
PROT SERPL-MCNC: 6.5 G/DL — SIGNIFICANT CHANGE UP (ref 6–8.3)
PROT SERPL-MCNC: 6.5 G/DL — SIGNIFICANT CHANGE UP (ref 6–8.3)
RBC # BLD: 3.79 M/UL — LOW (ref 4.2–5.8)
RBC # BLD: 3.79 M/UL — LOW (ref 4.2–5.8)
RBC # FLD: 16.5 % — HIGH (ref 10.3–14.5)
RBC # FLD: 16.5 % — HIGH (ref 10.3–14.5)
SODIUM SERPL-SCNC: 133 MMOL/L — LOW (ref 135–145)
SODIUM SERPL-SCNC: 133 MMOL/L — LOW (ref 135–145)
SPECIMEN SOURCE: SIGNIFICANT CHANGE UP
TACROLIMUS SERPL-MCNC: 13 NG/ML — SIGNIFICANT CHANGE UP
TACROLIMUS SERPL-MCNC: 13 NG/ML — SIGNIFICANT CHANGE UP
WBC # BLD: 12.02 K/UL — HIGH (ref 3.8–10.5)
WBC # BLD: 12.02 K/UL — HIGH (ref 3.8–10.5)
WBC # FLD AUTO: 12.02 K/UL — HIGH (ref 3.8–10.5)
WBC # FLD AUTO: 12.02 K/UL — HIGH (ref 3.8–10.5)

## 2024-01-08 PROCEDURE — 99233 SBSQ HOSP IP/OBS HIGH 50: CPT

## 2024-01-08 PROCEDURE — 71045 X-RAY EXAM CHEST 1 VIEW: CPT | Mod: 26

## 2024-01-08 PROCEDURE — 90791 PSYCH DIAGNOSTIC EVALUATION: CPT

## 2024-01-08 PROCEDURE — 99233 SBSQ HOSP IP/OBS HIGH 50: CPT | Mod: GC

## 2024-01-08 RX ORDER — HYDRALAZINE HCL 50 MG
25 TABLET ORAL EVERY 8 HOURS
Refills: 0 | Status: DISCONTINUED | OUTPATIENT
Start: 2024-01-08 | End: 2024-01-09

## 2024-01-08 RX ORDER — TACROLIMUS 5 MG/1
6 CAPSULE ORAL
Refills: 0 | Status: DISCONTINUED | OUTPATIENT
Start: 2024-01-08 | End: 2024-01-09

## 2024-01-08 RX ADMIN — VALGANCICLOVIR 450 MILLIGRAM(S): 450 TABLET, FILM COATED ORAL at 20:14

## 2024-01-08 RX ADMIN — Medication 100 MILLIGRAM(S): at 13:49

## 2024-01-08 RX ADMIN — MYCOPHENOLATE MOFETIL 1000 MILLIGRAM(S): 250 CAPSULE ORAL at 20:13

## 2024-01-08 RX ADMIN — Medication 15 MILLIGRAM(S): at 07:34

## 2024-01-08 RX ADMIN — MAGNESIUM OXIDE 400 MG ORAL TABLET 400 MILLIGRAM(S): 241.3 TABLET ORAL at 13:50

## 2024-01-08 RX ADMIN — VALGANCICLOVIR 450 MILLIGRAM(S): 450 TABLET, FILM COATED ORAL at 07:35

## 2024-01-08 RX ADMIN — HEPARIN SODIUM 5000 UNIT(S): 5000 INJECTION INTRAVENOUS; SUBCUTANEOUS at 05:35

## 2024-01-08 RX ADMIN — MYCOPHENOLATE MOFETIL 1000 MILLIGRAM(S): 250 CAPSULE ORAL at 07:35

## 2024-01-08 RX ADMIN — MAGNESIUM OXIDE 400 MG ORAL TABLET 400 MILLIGRAM(S): 241.3 TABLET ORAL at 17:12

## 2024-01-08 RX ADMIN — Medication 1 TABLET(S): at 11:52

## 2024-01-08 RX ADMIN — Medication 20 UNIT(S): at 07:37

## 2024-01-08 RX ADMIN — BUDESONIDE AND FORMOTEROL FUMARATE DIHYDRATE 1 PUFF(S): 160; 4.5 AEROSOL RESPIRATORY (INHALATION) at 05:34

## 2024-01-08 RX ADMIN — Medication 60 MILLIGRAM(S): at 05:34

## 2024-01-08 RX ADMIN — Medication 17 UNIT(S): at 17:18

## 2024-01-08 RX ADMIN — CHLORHEXIDINE GLUCONATE 1 APPLICATION(S): 213 SOLUTION TOPICAL at 05:00

## 2024-01-08 RX ADMIN — Medication 100 MILLIGRAM(S): at 05:34

## 2024-01-08 RX ADMIN — INSULIN GLARGINE 33 UNIT(S): 100 INJECTION, SOLUTION SUBCUTANEOUS at 22:22

## 2024-01-08 RX ADMIN — MAGNESIUM OXIDE 400 MG ORAL TABLET 400 MILLIGRAM(S): 241.3 TABLET ORAL at 07:35

## 2024-01-08 RX ADMIN — PANTOPRAZOLE SODIUM 40 MILLIGRAM(S): 20 TABLET, DELAYED RELEASE ORAL at 07:35

## 2024-01-08 RX ADMIN — TACROLIMUS 6.5 MILLIGRAM(S): 5 CAPSULE ORAL at 07:35

## 2024-01-08 RX ADMIN — Medication 4: at 13:12

## 2024-01-08 RX ADMIN — HEPARIN SODIUM 5000 UNIT(S): 5000 INJECTION INTRAVENOUS; SUBCUTANEOUS at 13:50

## 2024-01-08 RX ADMIN — Medication 15 MILLIGRAM(S): at 20:13

## 2024-01-08 RX ADMIN — OLANZAPINE 5 MILLIGRAM(S): 15 TABLET, FILM COATED ORAL at 22:21

## 2024-01-08 RX ADMIN — Medication 2: at 17:12

## 2024-01-08 RX ADMIN — Medication 17 UNIT(S): at 13:09

## 2024-01-08 RX ADMIN — Medication 60 MILLIGRAM(S): at 17:13

## 2024-01-08 RX ADMIN — TACROLIMUS 6 MILLIGRAM(S): 5 CAPSULE ORAL at 20:14

## 2024-01-08 RX ADMIN — Medication 5 MILLIGRAM(S): at 22:20

## 2024-01-08 RX ADMIN — BUDESONIDE AND FORMOTEROL FUMARATE DIHYDRATE 1 PUFF(S): 160; 4.5 AEROSOL RESPIRATORY (INHALATION) at 17:13

## 2024-01-08 RX ADMIN — FLUCONAZOLE 100 MILLIGRAM(S): 150 TABLET ORAL at 12:04

## 2024-01-08 NOTE — PROGRESS NOTE ADULT - SUBJECTIVE AND OBJECTIVE BOX
ADVANCED HEART FAILURE & TRANSPLANT- PROGRESS NOTE  *To reach the NS1 Team from 8am to 5pm, please call [phone].  ___________________________________________________________________________    Subjective:  - no issues  - multiple BM    Medications:  acetaminophen     Tablet .. 650 milliGRAM(s) Oral every 6 hours PRN  budesonide  80 MICROgram(s)/formoterol 4.5 MICROgram(s) Inhaler 1 Puff(s) Inhalation two times a day  chlorhexidine 4% Liquid 1 Application(s) Topical <User Schedule>  dextrose 5%. 1000 milliLiter(s) IV Continuous <Continuous>  dextrose 5%. 1000 milliLiter(s) IV Continuous <Continuous>  dextrose 50% Injectable 50 milliLiter(s) IV Push every 15 minutes  dextrose Oral Gel 15 Gram(s) Oral once PRN  fluconAZOLE   Tablet 100 milliGRAM(s) Oral <User Schedule>  glucagon  Injectable 1 milliGRAM(s) IntraMuscular once  heparin   Injectable 5000 Unit(s) SubCutaneous every 8 hours  hydrALAZINE 100 milliGRAM(s) Oral every 8 hours  insulin glargine Injectable (LANTUS) 33 Unit(s) SubCutaneous at bedtime  insulin lispro (ADMELOG) corrective regimen sliding scale   SubCutaneous three times a day before meals  insulin lispro (ADMELOG) corrective regimen sliding scale   SubCutaneous <User Schedule>  insulin lispro Injectable (ADMELOG) 20 Unit(s) SubCutaneous before breakfast  insulin lispro Injectable (ADMELOG) 17 Unit(s) SubCutaneous before dinner  insulin lispro Injectable (ADMELOG) 17 Unit(s) SubCutaneous before lunch  insulin regular Infusion 3 Unit(s)/Hr IV Continuous <Continuous>  lidocaine   4% Patch 3 Patch Transdermal daily  magnesium oxide 400 milliGRAM(s) Oral three times a day with meals  melatonin 5 milliGRAM(s) Oral at bedtime  mycophenolate mofetil 1000 milliGRAM(s) Oral every 12 hours  NIFEdipine XL 60 milliGRAM(s) Oral every 12 hours  OLANZapine 5 milliGRAM(s) Oral at bedtime  pantoprazole    Tablet 40 milliGRAM(s) Oral every 24 hours  polyethylene glycol 3350 17 Gram(s) Oral daily  predniSONE   Tablet 15 milliGRAM(s) Oral every 12 hours  senna 2 Tablet(s) Oral at bedtime  tacrolimus 6.5 milliGRAM(s) Oral <User Schedule>  trimethoprim   80 mG/sulfamethoxazole 400 mG 1 Tablet(s) Oral daily  valGANciclovir 450 milliGRAM(s) Oral every 12 hours    Vitals:  Vital Signs Last 24 Hours  T(C): 36.7 (01-08-24 @ 04:49), Max: 36.7 (01-08-24 @ 04:49)  HR: 91 (01-08-24 @ 04:49) (91 - 97)  BP: 96/61 (01-08-24 @ 04:49) (96/61 - 125/86)  RR: 18 (01-08-24 @ 04:49) (18 - 18)  SpO2: 93% (01-08-24 @ 04:49) (93% - 99%)        I&O's Summary    07 Jan 2024 07:01  -  08 Jan 2024 07:00  --------------------------------------------------------  IN: 1020 mL / OUT: 2500 mL / NET: -1480 mL        Physical Exam  General: No distress. Comfortable.  Neck: Neck supple. JVP not elevated. No masses  Chest: Clear to auscultation bilaterally  CV: Normal S1 and S2.  Abdomen: Soft, non-distended, non-tender  Extremities: warm and perfused  Neurology: Alert and oriented times three.     Labs:                        11.5   12.02 )-----------( 232      ( 08 Jan 2024 07:20 )             33.1     01-08    133<L>  |  89<L>  |  57<H>  ----------------------------<  133<H>  4.2   |  24  |  1.43<H>    Ca    9.5      08 Jan 2024 07:14  Phos  4.4     01-08  Mg     2.3     01-08    TPro  6.5  /  Alb  3.9  /  TBili  0.4  /  DBili  x   /  AST  24  /  ALT  48<H>  /  AlkPhos  58  01-08                      Imaging Studies  ADVANCED HEART FAILURE & TRANSPLANT- PROGRESS NOTE  *To reach the NS1 Team from 8am to 5pm, please call [phone].  ___________________________________________________________________________    Subjective:  - no issues  - multiple BM    Medications:  acetaminophen     Tablet .. 650 milliGRAM(s) Oral every 6 hours PRN  budesonide  80 MICROgram(s)/formoterol 4.5 MICROgram(s) Inhaler 1 Puff(s) Inhalation two times a day  chlorhexidine 4% Liquid 1 Application(s) Topical <User Schedule>  dextrose 5%. 1000 milliLiter(s) IV Continuous <Continuous>  dextrose 5%. 1000 milliLiter(s) IV Continuous <Continuous>  dextrose 50% Injectable 50 milliLiter(s) IV Push every 15 minutes  dextrose Oral Gel 15 Gram(s) Oral once PRN  fluconAZOLE   Tablet 100 milliGRAM(s) Oral <User Schedule>  glucagon  Injectable 1 milliGRAM(s) IntraMuscular once  heparin   Injectable 5000 Unit(s) SubCutaneous every 8 hours  hydrALAZINE 100 milliGRAM(s) Oral every 8 hours  insulin glargine Injectable (LANTUS) 33 Unit(s) SubCutaneous at bedtime  insulin lispro (ADMELOG) corrective regimen sliding scale   SubCutaneous three times a day before meals  insulin lispro (ADMELOG) corrective regimen sliding scale   SubCutaneous <User Schedule>  insulin lispro Injectable (ADMELOG) 20 Unit(s) SubCutaneous before breakfast  insulin lispro Injectable (ADMELOG) 17 Unit(s) SubCutaneous before dinner  insulin lispro Injectable (ADMELOG) 17 Unit(s) SubCutaneous before lunch  insulin regular Infusion 3 Unit(s)/Hr IV Continuous <Continuous>  lidocaine   4% Patch 3 Patch Transdermal daily  magnesium oxide 400 milliGRAM(s) Oral three times a day with meals  melatonin 5 milliGRAM(s) Oral at bedtime  mycophenolate mofetil 1000 milliGRAM(s) Oral every 12 hours  NIFEdipine XL 60 milliGRAM(s) Oral every 12 hours  OLANZapine 5 milliGRAM(s) Oral at bedtime  pantoprazole    Tablet 40 milliGRAM(s) Oral every 24 hours  polyethylene glycol 3350 17 Gram(s) Oral daily  predniSONE   Tablet 15 milliGRAM(s) Oral every 12 hours  senna 2 Tablet(s) Oral at bedtime  tacrolimus 6.5 milliGRAM(s) Oral <User Schedule>  trimethoprim   80 mG/sulfamethoxazole 400 mG 1 Tablet(s) Oral daily  valGANciclovir 450 milliGRAM(s) Oral every 12 hours    Vitals:  Vital Signs Last 24 Hours  T(C): 36.7 (01-08-24 @ 04:49), Max: 36.7 (01-08-24 @ 04:49)  HR: 91 (01-08-24 @ 04:49) (91 - 97)  BP: 96/61 (01-08-24 @ 04:49) (96/61 - 125/86)  RR: 18 (01-08-24 @ 04:49) (18 - 18)  SpO2: 93% (01-08-24 @ 04:49) (93% - 99%)        I&O's Summary    07 Jan 2024 07:01  -  08 Jan 2024 07:00  --------------------------------------------------------  IN: 1020 mL / OUT: 2500 mL / NET: -1480 mL        Physical Exam  General: No distress. Comfortable.  Neck: Neck supple. JVP not elevated. No masses  Chest: Clear to auscultation bilaterally  CV: Normal S1 and S2.  Abdomen: Soft, non-distended, non-tender  Extremities: warm and perfused  Neurology: Alert and oriented times three.     Labs:                        11.5   12.02 )-----------( 232      ( 08 Jan 2024 07:20 )             33.1     01-08    133<L>  |  89<L>  |  57<H>  ----------------------------<  133<H>  4.2   |  24  |  1.43<H>    Ca    9.5      08 Jan 2024 07:14  Phos  4.4     01-08  Mg     2.3     01-08    TPro  6.5  /  Alb  3.9  /  TBili  0.4  /  DBili  x   /  AST  24  /  ALT  48<H>  /  AlkPhos  58  01-08                      Imaging Studies  ADVANCED HEART FAILURE & TRANSPLANT- PROGRESS NOTE  *To reach the NS1 Team from 8am to 5pm, please call [phone].  ___________________________________________________________________________    Subjective:  - eager to leave, states he was told he is leaving today  - multiple BM    Medications:  acetaminophen     Tablet .. 650 milliGRAM(s) Oral every 6 hours PRN  budesonide  80 MICROgram(s)/formoterol 4.5 MICROgram(s) Inhaler 1 Puff(s) Inhalation two times a day  chlorhexidine 4% Liquid 1 Application(s) Topical <User Schedule>  dextrose 5%. 1000 milliLiter(s) IV Continuous <Continuous>  dextrose 5%. 1000 milliLiter(s) IV Continuous <Continuous>  dextrose 50% Injectable 50 milliLiter(s) IV Push every 15 minutes  dextrose Oral Gel 15 Gram(s) Oral once PRN  fluconAZOLE   Tablet 100 milliGRAM(s) Oral <User Schedule>  glucagon  Injectable 1 milliGRAM(s) IntraMuscular once  heparin   Injectable 5000 Unit(s) SubCutaneous every 8 hours  hydrALAZINE 100 milliGRAM(s) Oral every 8 hours  insulin glargine Injectable (LANTUS) 33 Unit(s) SubCutaneous at bedtime  insulin lispro (ADMELOG) corrective regimen sliding scale   SubCutaneous three times a day before meals  insulin lispro (ADMELOG) corrective regimen sliding scale   SubCutaneous <User Schedule>  insulin lispro Injectable (ADMELOG) 20 Unit(s) SubCutaneous before breakfast  insulin lispro Injectable (ADMELOG) 17 Unit(s) SubCutaneous before dinner  insulin lispro Injectable (ADMELOG) 17 Unit(s) SubCutaneous before lunch  insulin regular Infusion 3 Unit(s)/Hr IV Continuous <Continuous>  lidocaine   4% Patch 3 Patch Transdermal daily  magnesium oxide 400 milliGRAM(s) Oral three times a day with meals  melatonin 5 milliGRAM(s) Oral at bedtime  mycophenolate mofetil 1000 milliGRAM(s) Oral every 12 hours  NIFEdipine XL 60 milliGRAM(s) Oral every 12 hours  OLANZapine 5 milliGRAM(s) Oral at bedtime  pantoprazole    Tablet 40 milliGRAM(s) Oral every 24 hours  polyethylene glycol 3350 17 Gram(s) Oral daily  predniSONE   Tablet 15 milliGRAM(s) Oral every 12 hours  senna 2 Tablet(s) Oral at bedtime  tacrolimus 6.5 milliGRAM(s) Oral <User Schedule>  trimethoprim   80 mG/sulfamethoxazole 400 mG 1 Tablet(s) Oral daily  valGANciclovir 450 milliGRAM(s) Oral every 12 hours    Vitals:  Vital Signs Last 24 Hours  T(C): 36.7 (01-08-24 @ 04:49), Max: 36.7 (01-08-24 @ 04:49)  HR: 91 (01-08-24 @ 04:49) (91 - 97)  BP: 96/61 (01-08-24 @ 04:49) (96/61 - 125/86)  RR: 18 (01-08-24 @ 04:49) (18 - 18)  SpO2: 93% (01-08-24 @ 04:49) (93% - 99%)        I&O's Summary    07 Jan 2024 07:01  -  08 Jan 2024 07:00  --------------------------------------------------------  IN: 1020 mL / OUT: 2500 mL / NET: -1480 mL        Physical Exam  General: No distress. Comfortable.  Neck: JVP ~ 10-12cm   Chest: Clear to auscultation bilaterally  CV: Normal S1 and S2.  Abdomen: Soft, non-distended, non-tender  Extremities: warm and perfused  Neurology: Alert and oriented times three.     Labs:                        11.5   12.02 )-----------( 232      ( 08 Jan 2024 07:20 )             33.1     01-08    133<L>  |  89<L>  |  57<H>  ----------------------------<  133<H>  4.2   |  24  |  1.43<H>    Ca    9.5      08 Jan 2024 07:14  Phos  4.4     01-08  Mg     2.3     01-08    TPro  6.5  /  Alb  3.9  /  TBili  0.4  /  DBili  x   /  AST  24  /  ALT  48<H>  /  AlkPhos  58  01-08                      Imaging Studies  ADVANCED HEART FAILURE & TRANSPLANT- PROGRESS NOTE  *To reach the NS1 Team from 8am to 5pm, please call [phone].  ___________________________________________________________________________    Subjective:  - eager to leave, states he was told he is leaving today  - multiple BM    Medications:  acetaminophen     Tablet .. 650 milliGRAM(s) Oral every 6 hours PRN  budesonide  80 MICROgram(s)/formoterol 4.5 MICROgram(s) Inhaler 1 Puff(s) Inhalation two times a day  chlorhexidine 4% Liquid 1 Application(s) Topical <User Schedule>  dextrose 5%. 1000 milliLiter(s) IV Continuous <Continuous>  dextrose 5%. 1000 milliLiter(s) IV Continuous <Continuous>  dextrose 50% Injectable 50 milliLiter(s) IV Push every 15 minutes  dextrose Oral Gel 15 Gram(s) Oral once PRN  fluconAZOLE   Tablet 100 milliGRAM(s) Oral <User Schedule>  glucagon  Injectable 1 milliGRAM(s) IntraMuscular once  heparin   Injectable 5000 Unit(s) SubCutaneous every 8 hours  hydrALAZINE 100 milliGRAM(s) Oral every 8 hours  insulin glargine Injectable (LANTUS) 33 Unit(s) SubCutaneous at bedtime  insulin lispro (ADMELOG) corrective regimen sliding scale   SubCutaneous three times a day before meals  insulin lispro (ADMELOG) corrective regimen sliding scale   SubCutaneous <User Schedule>  insulin lispro Injectable (ADMELOG) 20 Unit(s) SubCutaneous before breakfast  insulin lispro Injectable (ADMELOG) 17 Unit(s) SubCutaneous before dinner  insulin lispro Injectable (ADMELOG) 17 Unit(s) SubCutaneous before lunch  insulin regular Infusion 3 Unit(s)/Hr IV Continuous <Continuous>  lidocaine   4% Patch 3 Patch Transdermal daily  magnesium oxide 400 milliGRAM(s) Oral three times a day with meals  melatonin 5 milliGRAM(s) Oral at bedtime  mycophenolate mofetil 1000 milliGRAM(s) Oral every 12 hours  NIFEdipine XL 60 milliGRAM(s) Oral every 12 hours  OLANZapine 5 milliGRAM(s) Oral at bedtime  pantoprazole    Tablet 40 milliGRAM(s) Oral every 24 hours  polyethylene glycol 3350 17 Gram(s) Oral daily  predniSONE   Tablet 15 milliGRAM(s) Oral every 12 hours  senna 2 Tablet(s) Oral at bedtime  tacrolimus 6.5 milliGRAM(s) Oral <User Schedule>  trimethoprim   80 mG/sulfamethoxazole 400 mG 1 Tablet(s) Oral daily  valGANciclovir 450 milliGRAM(s) Oral every 12 hours    Vitals:  Vital Signs Last 24 Hours  T(C): 36.7 (01-08-24 @ 04:49), Max: 36.7 (01-08-24 @ 04:49)  HR: 91 (01-08-24 @ 04:49) (91 - 97)  BP: 96/61 (01-08-24 @ 04:49) (96/61 - 125/86)  RR: 18 (01-08-24 @ 04:49) (18 - 18)  SpO2: 93% (01-08-24 @ 04:49) (93% - 99%)        I&O's Summary    07 Jan 2024 07:01  -  08 Jan 2024 07:00  --------------------------------------------------------  IN: 1020 mL / OUT: 2500 mL / NET: -1480 mL        Physical Exam  General: No distress. Comfortable.  Neck: JVP ~ 10-12cm   Chest: Clear to auscultation bilaterally  CV: Normal S1 and S2.  Abdomen: Soft, non-distended, non-tender  Extremities: warm and perfused  Neurology: Alert and oriented times three.     Labs:                        11.5   12.02 )-----------( 232      ( 08 Jan 2024 07:20 )             33.1     01-08    133<L>  |  89<L>  |  57<H>  ----------------------------<  133<H>  4.2   |  24  |  1.43<H>    Ca    9.5      08 Jan 2024 07:14  Phos  4.4     01-08  Mg     2.3     01-08    TPro  6.5  /  Alb  3.9  /  TBili  0.4  /  DBili  x   /  AST  24  /  ALT  48<H>  /  AlkPhos  58  01-08                      Imaging Studies

## 2024-01-08 NOTE — PROGRESS NOTE ADULT - PROBLEM SELECTOR PLAN 6
- Pt noting significant constipation and CT showing stercoral colitis 1/5  - S/p MgCO3  Plan:  - Cont lactulose 20 QID +/- golytely until pt starts to have regular BMs  - Can also use bisacodyl suppository if pt amenable   - Encourage ambulation - Pt noting significant constipation and CT showing stercoral colitis 1/5  - S/p MgCO3  - GI consulted   - Cont lactulose 20 QID +/- golytely   - Can also use bisacodyl suppository if pt amenable   - Encourage ambulation

## 2024-01-08 NOTE — PROGRESS NOTE ADULT - NS ATTEND AMEND GEN_ALL_CORE FT
57 yo M with HFrEF (LVIDd 6.4 cm, LVEF 32%), CAD s/p PCI (2008), HTN, DMT2 (A1c 8.3%) and CVA s/p TPA (2018), initially presented to [facility] via EMS after syncope reportedly requiring defibrillation left AMA to come to [facility] where found to have Covid and intubated 11/1 for respiratory failure. Underwent L/RHC 11/1revealing  of LAD and RCA, elevated filling pressures and CI of 1.5 prompting placement of IABP which had been removed c/b PMVT cardiac arrest requiring replacement of IABP. Treated for Staph epi, Enterococcus faecalis, Cutibacterium with IV abx.  Was listed Status 2, ABO A, PRA 0% (12/12) and on 12/25, he underwent OHT (ischemic Time: 253min, CMV -/+, Toxo -/-). The following day, extubated and IABP removed. Overall progressing well. He underwent RHC/EMBx with revealed elevated filling pressures and normal cardiac output. Was diuresed well. Noted to have significant constipation and required disimpaction with improvement. On exam, JVP wnl, RRR, no m/r/g, CTAB, soft abdomen, no pedal edema. Labs reviewd - K 4.2, BUN/Cr 57/1.43 (improved; prior 1.12). BGs controlled.   - plan for biopsy tomorrow  - reduce hydral to 25 mg q8h (goal -130)  - possible d/c tomorrow on current meds 59 yo M with HFrEF (LVIDd 6.4 cm, LVEF 32%), CAD s/p PCI (2008), HTN, DMT2 (A1c 8.3%) and CVA s/p TPA (2018), initially presented to [facility] via EMS after syncope reportedly requiring defibrillation left AMA to come to [facility] where found to have Covid and intubated 11/1 for respiratory failure. Underwent L/RHC 11/1revealing  of LAD and RCA, elevated filling pressures and CI of 1.5 prompting placement of IABP which had been removed c/b PMVT cardiac arrest requiring replacement of IABP. Treated for Staph epi, Enterococcus faecalis, Cutibacterium with IV abx.  Was listed Status 2, ABO A, PRA 0% (12/12) and on 12/25, he underwent OHT (ischemic Time: 253min, CMV -/+, Toxo -/-). The following day, extubated and IABP removed. Overall progressing well. He underwent RHC/EMBx with revealed elevated filling pressures and normal cardiac output. Was diuresed well. Noted to have significant constipation and required disimpaction with improvement. On exam, JVP wnl, RRR, no m/r/g, CTAB, soft abdomen, no pedal edema. Labs reviewd - K 4.2, BUN/Cr 57/1.43 (improved; prior 1.12). BGs controlled.   - plan for biopsy tomorrow  - reduce hydral to 25 mg q8h (goal -130)  - possible d/c tomorrow on current meds

## 2024-01-08 NOTE — PROGRESS NOTE ADULT - ASSESSMENT
59 yo M with HFrEF (LVIDd 6.4 cm, LVEF 32%), CAD s/p PCI (2008), HTN, DMT2 (A1c 8.3%) and CVA s/p TPA (2018), recently treated for PNA who initially presented to [facility] via EMS after syncope reportedly requiring defibrillation. Treated for ACS but left AMA to come to [facility]. On arrival ECG with ST depression in lateral leads. Intubated 11/1 for respiratory failure and was found to have COVID. L/RHC 11/1revealing  of LAD and RCA, elevated filling pressures and CI of 1.5 prompting placement of IABP. Extubated 11/3. IABP was weaned to off 11/7, however the following day developed PMVT cardiac arrest with prompt CPR and defibrillation, started on IV Lidocaine/Amio and IABP ultimately replaced on 11/9. During this admission was found to be bacteremic for which she was treated for Staph epi, Enterococcus faecalis, Cutibacterium with IV abx.  Was listed Status 2, ABO A, PRA 0% (12/12).     A suitable donor was identified and on 12/25, he underwent OHT (ischemic Time: 253min, CMV -/+, Toxo -/-). The following day, extubated and IABP removed. Overall progressing well. He underwent RHC/EMBx with revealed elevated filling pressures and normal cardiac output. His diuretics have been increased and he is responding well. He continues to have elevated WBC however remains afebrile and off antibiotics. Currently undergoing infectious work up. Next RHC/EMBx schedule Tuesday 1/9 59 yo M with HFrEF (LVIDd 6.4 cm, LVEF 32%), CAD s/p PCI (2008), HTN, DMT2 (A1c 8.3%) and CVA s/p TPA (2018), recently treated for PNA who initially presented to [facility] via EMS after syncope reportedly requiring defibrillation. Treated for ACS but left AMA to come to [facility]. On arrival ECG with ST depression in lateral leads. Intubated 11/1 for respiratory failure and was found to have COVID. L/RHC 11/1revealing  of LAD and RCA, elevated filling pressures and CI of 1.5 prompting placement of IABP. Extubated 11/3. IABP was weaned to off 11/7, however the following day developed PMVT cardiac arrest with prompt CPR and defibrillation, started on IV Lidocaine/Amio and IABP ultimately replaced on 11/9. During this admission was found to be bacteremic for which she was treated for Staph epi, Enterococcus faecalis, Cutibacterium with IV abx.  Was listed Status 2, ABO A, PRA 0% (12/12).     A suitable donor was identified and on 12/25, he underwent OHT (ischemic Time: 253min, CMV -/+, Toxo -/-). The following day, extubated and IABP removed. Overall progressing well. He underwent RHC/EMBx with revealed elevated filling pressures and normal cardiac output. His diuretics have been increased and he is responding well. He continues to have elevated WBC however remains afebrile and off antibiotics. Currently undergoing infectious work up. Next RHC/EMBx schedule Tuesday 1/9 59 yo M with HFrEF (LVIDd 6.4 cm, LVEF 32%), CAD s/p PCI (2008), HTN, DMT2 (A1c 8.3%) and CVA s/p TPA (2018), recently treated for PNA who initially presented to [facility] via EMS after syncope reportedly requiring defibrillation. Treated for ACS but left AMA to come to [facility]. On arrival ECG with ST depression in lateral leads. Intubated 11/1 for respiratory failure and was found to have COVID. L/RHC 11/1revealing  of LAD and RCA, elevated filling pressures and CI of 1.5 prompting placement of IABP. Extubated 11/3. IABP was weaned to off 11/7, however the following day developed PMVT cardiac arrest with prompt CPR and defibrillation, started on IV Lidocaine/Amio and IABP ultimately replaced on 11/9. During this admission was found to be bacteremic for which she was treated for Staph epi, Enterococcus faecalis, Cutibacterium with IV abx.  Was listed Status 2, ABO A, PRA 0% (12/12).     A suitable donor was identified and on 12/25, he underwent OHT (ischemic Time: 253min, CMV -/+, Toxo -/-). The following day, extubated and IABP removed. Overall progressing well. He underwent RHC/EMBx with revealed elevated filling pressures and normal cardiac output. His diuretics have been increased and he is responding well. He continues to have elevated WBC however remains afebrile and off antibiotics. Infectious workup has been negative today however on CT scan was noted to have significant constipation.  Next RHC/EMBx schedule tomorrow 1/9 and hopeful for discharge home later that day 57 yo M with HFrEF (LVIDd 6.4 cm, LVEF 32%), CAD s/p PCI (2008), HTN, DMT2 (A1c 8.3%) and CVA s/p TPA (2018), recently treated for PNA who initially presented to [facility] via EMS after syncope reportedly requiring defibrillation. Treated for ACS but left AMA to come to [facility]. On arrival ECG with ST depression in lateral leads. Intubated 11/1 for respiratory failure and was found to have COVID. L/RHC 11/1revealing  of LAD and RCA, elevated filling pressures and CI of 1.5 prompting placement of IABP. Extubated 11/3. IABP was weaned to off 11/7, however the following day developed PMVT cardiac arrest with prompt CPR and defibrillation, started on IV Lidocaine/Amio and IABP ultimately replaced on 11/9. During this admission was found to be bacteremic for which she was treated for Staph epi, Enterococcus faecalis, Cutibacterium with IV abx.  Was listed Status 2, ABO A, PRA 0% (12/12).     A suitable donor was identified and on 12/25, he underwent OHT (ischemic Time: 253min, CMV -/+, Toxo -/-). The following day, extubated and IABP removed. Overall progressing well. He underwent RHC/EMBx with revealed elevated filling pressures and normal cardiac output. His diuretics have been increased and he is responding well. He continues to have elevated WBC however remains afebrile and off antibiotics. Infectious workup has been negative today however on CT scan was noted to have significant constipation.  Next RHC/EMBx schedule tomorrow 1/9 and hopeful for discharge home later that day

## 2024-01-08 NOTE — CHART NOTE - NSCHARTNOTEFT_GEN_A_CORE
Brief GI Note:    Patient s/p digital disimpaction with resumed BM flow. Patient appears comfortable this AM, reporting multiple BM's and improvement of abdominal pain/distension. Discussed with team regarding need for ongoing bowel regimen/ambulation while admitted. Patient will need to be DC on bowel regimen, can consider Miralax BiD, and instructed to maintain good hydration and ambulation. GI to sign off, please call back if any questions or concerns.      Enoc Morrison MD  PGY-5 GI/Hepatology

## 2024-01-08 NOTE — PROGRESS NOTE ADULT - PROBLEM SELECTOR PLAN 1
- s/p OHT on 12/25. Ischemic Time: 253min  - IABP removed 12/26  - continue with Procardia XL 60 mg BID and hydralazine 100 mg PO TID  - Hold Bumex and reassess  - underwent RHC/EMBx on 1/2, Grade 0R  - next RHC/EMBx schedule for Tuesday 1/9  - Please keep primary Dr. Banuelos [phone] in the loop per family request. - s/p OHT on 12/25. Ischemic Time: 253min  - IABP removed 12/26  - continue with Procardia XL 60 mg BID and hydralazine 100 mg PO TID  - Hold Bumex and reassess  - underwent RHC/EMBx on 1/2, Grade 0R  - next RHC/EMBx schedule for Tuesday 1/9  - Please keep primary Dr. Banuelos [phone] in the loop per family request. - s/p OHT on 12/25. Ischemic Time: 253min  - IABP removed 12/26  - continue with Procardia XL 60 mg BID and hydralazine 100 mg PO TID  - currently off standing diuretics   - underwent RHC/EMBx on 1/2, Grade 0R  - next RHC/EMBx schedule for tomorrow 1/9. Will need to be NPO and subq heparin   - Please keep primary Dr. Banuelos [phone] in the loop per family request. - s/p OHT on 12/25. Ischemic Time: 253min  - IABP removed 12/26  - continue with Procardia XL 60 mg BID and hydralazine 100 mg PO TID  - currently off standing diuretics   - underwent RHC/EMBx on 1/2, Grade 0R  - next RHC/EMBx schedule for tomorrow 1/9. Will need to be NPO and subq heparin   - Please keep primary Dr. Banuelos [phone] in the loop per family request. - s/p OHT on 12/25. Ischemic Time: 253min  - IABP removed 12/26  - continue with Procardia XL 60 mg BID and reduce hydralazine 25 mg PO TID  - currently off standing diuretics   - underwent RHC/EMBx on 1/2, Grade 0R  - next RHC/EMBx schedule for tomorrow 1/9. Will need to be NPO and subq heparin   - Please keep primary Dr. Banuelos [phone] in the loop per family request. - s/p OHT on 12/25. Ischemic Time: 253min  - IABP removed 12/26  - continue with Procardia XL 60 mg BID and reduce hydralazine 25 mg PO TID  - currently off standing diuretics   - underwent RHC/EMBx on 1/2, Grade 0R  - next RHC/EMBx schedule for tomorrow 1/9. Will need to be NPO and subq heparin   - Please keep primary Dr. Banuelos [phone] in the loop per family request. - s/p OHT on 12/25. Ischemic Time: 253min  - IABP removed 12/26  - continue with Procardia XL 60 mg BID and reduce hydralazine 25 mg PO TID  - currently off standing diuretics   - underwent RHC/EMBx on 1/2, Grade 0R  - next RHC/EMBx schedule for tomorrow 1/9. Will need to be NPO and subq heparin held  - Please keep primary Dr. Banuelos [phone] in the loop per family request. - s/p OHT on 12/25. Ischemic Time: 253min  - IABP removed 12/26  - continue with Procardia XL 60 mg BID and reduce hydralazine 25 mg PO TID  - currently off standing diuretics   - underwent RHC/EMBx on 1/2, Grade 0R  - next RHC/EMBx schedule for tomorrow 1/9. Will need to be NPO and subq heparin held  - Please keep primary Dr. Banuelos [phone] in the loop per family request.

## 2024-01-08 NOTE — PROGRESS NOTE ADULT - SUBJECTIVE AND OBJECTIVE BOX
VITAL SIGNS    Telemetry:      Vital Signs Last 24 Hrs  T(C): 36.7 (01-08-24 @ 04:49), Max: 36.7 (01-08-24 @ 04:49)  T(F): 98.1 (01-08-24 @ 04:49), Max: 98.1 (01-08-24 @ 04:49)  HR: 91 (01-08-24 @ 04:49) (91 - 97)  BP: 96/61 (01-08-24 @ 04:49) (96/61 - 125/86)  RR: 18 (01-08-24 @ 04:49) (18 - 18)  SpO2: 93% (01-08-24 @ 04:49) (93% - 99%)                   Daily     Daily       Bilirubin Total: 0.4 mg/dL (01-08 @ 07:14)    CAPILLARY BLOOD GLUCOSE      POCT Blood Glucose.: 141 mg/dL (08 Jan 2024 07:35)  POCT Blood Glucose.: 116 mg/dL (08 Jan 2024 02:32)  POCT Blood Glucose.: 154 mg/dL (07 Jan 2024 23:15)  POCT Blood Glucose.: 83 mg/dL (07 Jan 2024 21:50)  POCT Blood Glucose.: 94 mg/dL (07 Jan 2024 11:32)                                PHYSICAL EXAM  S"  Neurology: alert and oriented x 3, moves all extremities with no defecits  CV :  RRR  Sternal Wound :  CDI , Stable  Lungs:   CTA B/L  Abdomen: soft, nontender, nondistended, positive bowel sounds, last bowel movement   Extremities:                                            VITAL SIGNS    Telemetry:  sr 100    Vital Signs Last 24 Hrs  T(C): 36.7 (01-08-24 @ 04:49), Max: 36.7 (01-08-24 @ 04:49)  T(F): 98.1 (01-08-24 @ 04:49), Max: 98.1 (01-08-24 @ 04:49)  HR: 91 (01-08-24 @ 04:49) (91 - 97)  BP: 96/61 (01-08-24 @ 04:49) (96/61 - 125/86)  RR: 18 (01-08-24 @ 04:49) (18 - 18)  SpO2: 93% (01-08-24 @ 04:49) (93% - 99%)                   Daily     Daily       Bilirubin Total: 0.4 mg/dL (01-08 @ 07:14)    CAPILLARY BLOOD GLUCOSE      POCT Blood Glucose.: 141 mg/dL (08 Jan 2024 07:35)  POCT Blood Glucose.: 116 mg/dL (08 Jan 2024 02:32)  POCT Blood Glucose.: 154 mg/dL (07 Jan 2024 23:15)  POCT Blood Glucose.: 83 mg/dL (07 Jan 2024 21:50)  POCT Blood Glucose.: 94 mg/dL (07 Jan 2024 11:32)                                PHYSICAL EXAM  S"   no SOB  No CP"  Neurology: alert and oriented x 3, moves all extremities with no defecits  CV :  RRR  Sternal Wound :  CDI , Stable  Lungs:   CTA B/L  Abdomen: soft, nontender, nondistended, positive bowel sounds, last bowel movement   1/8  Extremities:     no edema

## 2024-01-08 NOTE — PROGRESS NOTE ADULT - PROBLEM SELECTOR PLAN 4
- pre transplant had baseline Cr 1.2, peaked to 4, now at relative baseline  - cardiorenal following  - diuretics as stated above   - monitor closely. - pre transplant had baseline Cr 1.2, peaked to 4  - cardiorenal following  - post transplant was noted to have bump in renal function, remains slightly above baseline but stable   - diuretics as stated above   - monitor closely.

## 2024-01-08 NOTE — PROGRESS NOTE ADULT - ASSESSMENT
Incoming call from pt requesting to speak with [facility]. Informed pt  received all income documents on 3/17 and are waiting to hear back on a decision . Pt still requesting call back from [facility].    58M : HFrEF (LVIDd 6.4 cm, LVEF 32%), PCI ('08), HTN, DMT2 (A1c 8.3%) and CVA s/p TPA ('18), recently treated for PNA who initially presented to [facility] via EMS after syncope reportedly requiring defibrillation. Treated for ACS but left AMA to come to [facility]. On arrival ECG with ST depression in lateral leads. Intubated 11/1 for respiratory failure and was found to have COVID. L/RHC 11/1revealing  of LAD and RCA, elevated filling pressures and CI of 1.5 prompting placement of IABP. Extubated 11/3. IABP was weaned to off 11/7, however the following day developed PMVT cardiac arrest with prompt CPR and defibrillation, started on IV Lidocaine/Amio and IABP ultimately replaced on 11/9. During this admission was found to be bacteremic for which she was treated for Staph epi, Enterococcus faecalis, Cutibacterium with IV abx.  Was listed Status 2, ABO A, PRA 0% (12/12).     A suitable donor was identified and on 12/25, he underwent OHT (ischemic Time: 253min, CMV -/+, Toxo -/-). The following day, extubated and IABP removed. Milrinone was being gradually weaned but when turned off yesterday, despite adequate perfusion indicies, had rise in lactate for which inotropic support was resumed. Currently appears well supported and compensated. Will again trial wean off inotrope. Will also adjust oral antihypertensive regimen with goal to wean off Cardene gtt. Plan for 1st EMBX next week on 1/2.     On 12/25/23, pt underwent OHT  - IABP removed 12/26  - Sabrina and Epi weaned off 12/26  - Milrinone off  - Procardia XL 60 mg BID  - Bumex TID  - 1st RHC/EMBx on Tuesday 1/2  - Transfer to Stepdown 12/31  -  Right femerol rangel in place   - Med CT x 3 - LWS      insulin gtt d/t hyperglycemia - house endo following   - d/c planning - aniticipate home   1/1/24 - VSS - pt. c/o uncomfortable bed last evening - stating he would sign out AMA if he was not given a more comfortable bed.  staff obliged & a new bed was brought in.  pt requesting new urinal with each void as he "is at risk for an infection"  Ptt properly educated on post transplant risks.  states, "A doctor told me I can only use a urinal once to prevent infection"  transplant team to reinforce post-transplant precautions. MED CT x 3 in place  ?d/c femerol rangel today - will rounds with transplant team  1/2/24 VSS - NPO for cardiac bx -rhc this am-  pt refused blood draw this am despite provider discussing the importance of blood levels post transplant.  Pt acquiesced - allowed blood draw @ 7:30a-d/c planning -   1/3 s/p biopsy and rhc yesterday Ms ct x 1 remains in place.  Glucose improved, currentyly off insulin infusion  1/4/23   F/u cultures, rvp neg Glucose stable overnight MS ct remains in place  1/5  VSS  afebrile Na+ 133.  Wbc 16.  Ct c/a/p results pnd.  RVP neg.  BC-ngtd.  -200cc/24.  BS 99.  Tacro 8.2  1/6 VSS  afebrile  Na 132 Wbc 18.2.  UA ordered.   CT a/p/c mild stercoral colitis.  -1.7L  BC NG 48 hrs.  Tacro 10.6  1/7 VSS, WBC 14.1, afebrile, UA negative, , Bumex dc'd. Repeat BMP this afternoon. Plan for Golytely & enema today, pending BM. Check Abd Xray. Tacro level 12.2.  1/8     vss     one large BM  this am     tac level13     58M : HFrEF (LVIDd 6.4 cm, LVEF 32%), PCI ('08), HTN, DMT2 (A1c 8.3%) and CVA s/p TPA ('18), recently treated for PNA who initially presented to [facility] via EMS after syncope reportedly requiring defibrillation. Treated for ACS but left AMA to come to [facility]. On arrival ECG with ST depression in lateral leads. Intubated 11/1 for respiratory failure and was found to have COVID. L/RHC 11/1revealing  of LAD and RCA, elevated filling pressures and CI of 1.5 prompting placement of IABP. Extubated 11/3. IABP was weaned to off 11/7, however the following day developed PMVT cardiac arrest with prompt CPR and defibrillation, started on IV Lidocaine/Amio and IABP ultimately replaced on 11/9. During this admission was found to be bacteremic for which she was treated for Staph epi, Enterococcus faecalis, Cutibacterium with IV abx.  Was listed Status 2, ABO A, PRA 0% (12/12).     A suitable donor was identified and on 12/25, he underwent OHT (ischemic Time: 253min, CMV -/+, Toxo -/-). The following day, extubated and IABP removed. Milrinone was being gradually weaned but when turned off yesterday, despite adequate perfusion indicies, had rise in lactate for which inotropic support was resumed. Currently appears well supported and compensated. Will again trial wean off inotrope. Will also adjust oral antihypertensive regimen with goal to wean off Cardene gtt. Plan for 1st EMBX next week on 1/2.     On 12/25/23, pt underwent OHT  - IABP removed 12/26  - Sabrina and Epi weaned off 12/26  - Milrinone off  - Procardia XL 60 mg BID  - Bumex TID  - 1st RHC/EMBx on Tuesday 1/2  - Transfer to Stepdown 12/31  -  Right femerol rangel in place   - Med CT x 3 - LWS      insulin gtt d/t hyperglycemia - house endo following   - d/c planning - aniticipate home   1/1/24 - VSS - pt. c/o uncomfortable bed last evening - stating he would sign out AMA if he was not given a more comfortable bed.  staff obliged & a new bed was brought in.  pt requesting new urinal with each void as he "is at risk for an infection"  Ptt properly educated on post transplant risks.  states, "A doctor told me I can only use a urinal once to prevent infection"  transplant team to reinforce post-transplant precautions. MED CT x 3 in place  ?d/c femerol rangel today - will rounds with transplant team  1/2/24 VSS - NPO for cardiac bx -rhc this am-  pt refused blood draw this am despite provider discussing the importance of blood levels post transplant.  Pt acquiesced - allowed blood draw @ 7:30a-d/c planning -   1/3 s/p biopsy and rhc yesterday Ms ct x 1 remains in place.  Glucose improved, currentyly off insulin infusion  1/4/23   F/u cultures, rvp neg Glucose stable overnight MS ct remains in place  1/5  VSS  afebrile Na+ 133.  Wbc 16.  Ct c/a/p results pnd.  RVP neg.  BC-ngtd.  -200cc/24.  BS 99.  Tacro 8.2  1/6 VSS  afebrile  Na 132 Wbc 18.2.  UA ordered.   CT a/p/c mild stercoral colitis.  -1.7L  BC NG 48 hrs.  Tacro 10.6  1/7 VSS, WBC 14.1, afebrile, UA negative, , Bumex dc'd. Repeat BMP this afternoon. Plan for Golytely & enema today, pending BM. Check Abd Xray. Tacro level 12.2.  1/8     vss     one large BM  this am     tac level13

## 2024-01-08 NOTE — PROGRESS NOTE ADULT - SUBJECTIVE AND OBJECTIVE BOX
Behavioral Cardiology Progress Note     History of Present Illness: Mr. Hardy is a 58 year-old man with history of HTN, CAD (1 stent in 2009), ICH (2008) presented on 11/1 with abnormal EKG. Patient presented to [facility] where he was found to have STEMI, recommended to get cath however patient did not want treatment at OSH so left and came to [facility]. EKG here with ST depression in lateral leads and elevation in anterior leads. Prior to C found to be tachycardic, dyspneic, intubated. LHC 11/1 with chronic total occlusion of LAD and RCA, with elevated RA and PA pressures. TTE 11/1 with severely decreased EF 32%, s/p IABP 11/1.     Social history: Mr. Martin Hardy is a 58-year-old Syrian American,  male with three children, including two sons and three daughters. 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 His wife, Brynn, works as an  in a public high school in [employer] [facility]. Mr. Hardy reported three close friendships with men who live in [place]. Notably, one of these men, Dr. Brody Le, is Mr. Hardy’s healthcare proxy and PCP. Mr. Hardy also reported that these friends feel like family due to their closeness. Currently, Mr. Hardy lives in a condominium apartment in [place] [place] [facility] with his wife and children. 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 Mr. Hardy noted his parents live in [place] as does one brother and one sister. Mr. Hardy has another sister, diagnosed with bipolar disorder, who lives in [place]. He described feeling distant from his siblings, as they lack a close relationship.      Substance use:    • Tobacco: Denies current and past tobacco use.    • Alcohol: Denies current and past alcohol use.   • Drug: Denies current and past drug use.      Past psychiatric history: Mr. Hardy denied any history of self-harm and suicidal ideation, plan, or attempt. He also denied any history of violent thoughts or behaviors. Denies history of outpatient treatment with a therapist or psychiatrist. With this said, he reported a history of odd thinking related, health-related anxiety. For example, Mr. Hardy reported meeting with a physician about 12 years ago because he feared having a brain tumor. He believed that the presence of a brain tumor could explain his superior memory. Earlier this year, he revealed anxiety to his PCP, who prescribed Mr. Hardy Lorazepam (0.5mg PRN). Per medical chart, Mr. Hardy filled his prescription three separate times in the past year. He reported concern about dependency, given Lorazepam’s status as a controlled medication, and desire to avoid substance abuse.      Psychological assessment: Reports feeling frustrated that he is not being discharged today. Although no definitive discharge date was given, feels he is ready to go home. Heart transplant team spoke with patient to address his questions and concerns. Understands he is having a cardiac biopsy tomorrow, also his medication doses are being adjusted. When asked to give a numerical value for his frustration reports 9/10. Receptive to engaging in a mindful breathing exercise to facilitate stress reduction and promoting relaxation. Encouraged Mr. Hardy to utilize breathing exercise throughout the day to manage stress. Family members (daughter and niece) visited today. Appetite good. Endorsed frustration, stress. Denies feeling hopeless, depressed. Denies anhedonia. Denies s/i.       Mental Status Exam: Seen sitting in chair on 2 [address]os. Alert, oriented x3. Well related with good eye contact. Speech spontaneous, fluent, normal volume and rate. Mood "frustrated." Affect full range. Thought process goal directed, content focused on frustrations. Insight into disease fair. Immediate judgement good.      Dx: Adjustment Disorder with anxiety       Recommendations:     ·	Psychology will continue to follow for supportive therapy and CBT strategies to manage anxiety.  ·	Follow up with psychiatry.    ·	Holistic Nurse.   ·	LVAD and heart transplant support group information provided.       30 minutes spent on total patient encounter       Behavioral Cardiology Progress Note     History of Present Illness: Mr. Hardy is a 58 year-old man with history of HTN, CAD (1 stent in 2009), ICH (2008) presented on 11/1 with abnormal EKG. Patient presented to [facility] where he was found to have STEMI, recommended to get cath however patient did not want treatment at OSH so left and came to [facility]. EKG here with ST depression in lateral leads and elevation in anterior leads. Prior to C found to be tachycardic, dyspneic, intubated. LHC 11/1 with chronic total occlusion of LAD and RCA, with elevated RA and PA pressures. TTE 11/1 with severely decreased EF 32%, s/p IABP 11/1.     Social history: Mr. Martin Hardy is a 58-year-old [place]ut[place] American,  male with three children, including two sons and three daughters. 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 His wife, Brynn, works as an  in a public high school in [facility]. Mr. Hardy reported three close friendships with men who live in [place]. Notably, one of these men, Dr. Brody eL, is Mr. Hardy’s healthcare proxy and PCP. Mr. Hardy also reported that these friends feel like family due to their closeness. Currently, Mr. Hardy lives in a condominium apartment in [place] with his wife and children. 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 Mr. Hardy noted his parents live in [place] as does one brother and one sister. Mr. Hardy has another sister, diagnosed with bipolar disorder, who lives in [place]. He described feeling distant from his siblings, as they lack a close relationship.      Substance use:    • Tobacco: Denies current and past tobacco use.    • Alcohol: Denies current and past alcohol use.   • Drug: Denies current and past drug use.      Past psychiatric history: Mr. Hardy denied any history of self-harm and suicidal ideation, plan, or attempt. He also denied any history of violent thoughts or behaviors. Denies history of outpatient treatment with a therapist or psychiatrist. With this said, he reported a history of odd thinking related, health-related anxiety. For example, Mr. Hardy reported meeting with a physician about 12 years ago because he feared having a brain tumor. He believed that the presence of a brain tumor could explain his superior memory. Earlier this year, he revealed anxiety to his PCP, who prescribed Mr. Hardy Lorazepam (0.5mg PRN). Per medical chart, Mr. Hardy filled his prescription three separate times in the past year. He reported concern about dependency, given Lorazepam’s status as a controlled medication, and desire to avoid substance abuse.      Psychological assessment: Reports feeling frustrated that he is not being discharged today. Although no definitive discharge date was given, feels he is ready to go home. Heart transplant team spoke with patient to address his questions and concerns. Understands he is having a cardiac biopsy tomorrow, also his medication doses are being adjusted. When asked to give a numerical value for his frustration reports 9/10. Receptive to engaging in a mindful breathing exercise to facilitate stress reduction and promoting relaxation. Encouraged Mr. Hardy to utilize breathing exercise throughout the day to manage stress. Family members (daughter and niece) visited today. Appetite good. Endorsed frustration, stress. Denies feeling hopeless, depressed. Denies anhedonia. Denies s/i.       Mental Status Exam: Seen sitting in chair on 2 [address]os. Alert, oriented x3. Well related with good eye contact. Speech spontaneous, fluent, normal volume and rate. Mood "frustrated." Affect full range. Thought process goal directed, content focused on frustrations. Insight into disease fair. Immediate judgement good.      Dx: Adjustment Disorder with anxiety       Recommendations:     ·	Psychology will continue to follow for supportive therapy and CBT strategies to manage anxiety.  ·	Follow up with psychiatry.    ·	Holistic Nurse.   ·	LVAD and heart transplant support group information provided.       30 minutes spent on total patient encounter

## 2024-01-08 NOTE — PROGRESS NOTE ADULT - ASSESSMENT
59 yo male h/o htn, cad s/p pci, ICH, here with NSTEMI  s/p intubation and cath. now in CCU    NSTEMI  s/p  cath  cath results noted. multi vessel dz., CTSx f/u.    pt with vtach/fib arrest again on 11/8. s/p ACLS and ROSC.   in ccu with iabp   plan for transplant as per HF and transplant team  transplant w/u ongoing.   s/p colonoscopy 11/17. normal  now listed for transplant as of 11/22 12/25: pt s/p heart transplant last night. remains intubated with iabp in CTU.   12/26: pt extubated to high flow this am. IABP currently being removed. pt a0x3.   12/27: pt feels well. walked with PT this am. currently comforable sitting in chair. IABP removed. wean off pressors as able.   12/28-29: no acute events o/n. weaning down ionotropes/pressors as able. immunosuppressives as per transplant team. PT  12/30: feels well. pressors/ionotropes weaned off. removal of chest tubes as able. immunosupressives as per id. PT.  12/31: feels well. tx out of ctu to step down unit. cont care as per ctsx team. PT  1/1: no acute events o/n. to transition off insulin gtt today. PT. d/w family bedside  1/2: no acute events o/n. plan on removal of a line and chest tubes today as per ctsx. dm mngt as per endo  1/3: feels well. s/p cardiac biopsy yesterday. worked with PT today. one CT remaining.   1/4: no acute events o/n. FS improved. mngt of CT as per CTSx   1/5: doing well. no c/o. leukocytosis noted. steroids likely contributing.  CT noted. pt reports +BM today  1/6: no acute events. +BM today. cont care as per CTSx team. eager to go home   1/7: no acute events o/n. CT with sterocal colitis. gi consulted. bowel regimen as per gi.  1/8: feels well. +large bm today. gi f/u noted. wbc improved. transplant f/u      mngt as per CTSx team          Advanced care planning was discussed with patient and family.  Advanced care planning forms were reviewed and discussed as appropriate.  Differential diagnosis and plan of care discussed with patient after the evaluation.   Pain assessed and judicious use of narcotics when appropriate was discussed.  Importance of Fall prevention discussed.  Counseling on Smoking and Alcohol cessation was offered when appropriate.  Counseling on Diet, exercise, and medication compliance was done.       Approx 75 minutes spent.

## 2024-01-08 NOTE — CHART NOTE - NSCHARTNOTESELECT_GEN_ALL_CORE
Event Note
Event Note
Hepatology/Event Note
IABP Wean/Event Note
Nephrology/Event Note
Nutrition Services
Transfer Note
groin sheath removal/Event Note
CICU Accept Note/Event Note
CICU Accept/Event Note
Event Note
ISTOP/Event Note
Nutrition Services
sheath removal/Event Note
Event Note
Hepatology/Event Note
Nutrition Services

## 2024-01-08 NOTE — PROGRESS NOTE ADULT - SUBJECTIVE AND OBJECTIVE BOX
LD VALENCIA  58y Male  MRN:[id number]    Patient is a 58y old  Male who presents with a chief complaint of NSTEMI (01 Nov 2023 20:29)    HPI:  59yo M w/ hx HTN, CAD w/ 1 stent in 2009, ICH (2008) presenting with abn ekg. Patient presented to [facility] where he was found to have STEMI, recommended to get cath however patient did not want to get it there so it left and came here.  Patient initially had cough, congestion, fever, was placed on antibiotics on Sunday.  Started feeling nauseous and had a presyncopal event after which he presented to ED last night.  Had chest pain as well.  Chest pain is midsternal.  Not currently having chest pain.  Received 4 aspirin 30 min pta. (01 Nov 2023 15:11)      Patient seen and evaluated at bedside. interval events noted    Interval HPI:   no acute events o/n     PAST MEDICAL & SURGICAL HISTORY:  HTN (hypertension)      CAD (coronary artery disease)  2009; stent      Intracranial hemorrhage  2008      Respiratory arrest  december 1st      Myocardial infarction, unspecified MI type, unspecified artery      History of coronary artery stent placement          REVIEW OF SYSTEMS:  as per hpi     VITALS:   Vital Signs Last 24 Hrs  T(C): 36.7 (08 Jan 2024 04:49), Max: 36.7 (08 Jan 2024 04:49)  T(F): 98.1 (08 Jan 2024 04:49), Max: 98.1 (08 Jan 2024 04:49)  HR: 91 (08 Jan 2024 04:49) (91 - 97)  BP: 96/61 (08 Jan 2024 04:49) (96/61 - 125/86)  BP(mean): 71 (08 Jan 2024 04:49) (71 - 99)  RR: 18 (08 Jan 2024 04:49) (18 - 18)  SpO2: 93% (08 Jan 2024 04:49) (93% - 99%)    Parameters below as of 08 Jan 2024 04:49  Patient On (Oxygen Delivery Method): room air          PHYSICAL EXAM:  GENERAL: NAD, comfortable.    HEAD:  Atraumatic, Normocephalic  EYES: EOMI, PERRLA, conjunctiva and sclera clear  NECK:  No JVD.    CHEST/LUNG: Clear to auscultation bilaterally; No wheeze    HEART: Regular rate and rhythm; No murmurs, rubs, or gallops  ABDOMEN: Soft, Nontender, Nondistended; Bowel sounds present  EXTREMITIES:  2+ Peripheral Pulses, No clubbing, cyanosis, or edema  NEUROLOGY: a0x3          Consultant(s) Notes Reviewed:  [x ] YES  [ ] NO  Care Discussed with Consultants/Other Providers [ x] YES  [ ] NO    MEDS:   MEDICATIONS  (STANDING):  budesonide  80 MICROgram(s)/formoterol 4.5 MICROgram(s) Inhaler 1 Puff(s) Inhalation two times a day  chlorhexidine 4% Liquid 1 Application(s) Topical <User Schedule>  dextrose 5%. 1000 milliLiter(s) (100 mL/Hr) IV Continuous <Continuous>  dextrose 5%. 1000 milliLiter(s) (50 mL/Hr) IV Continuous <Continuous>  dextrose 50% Injectable 50 milliLiter(s) IV Push every 15 minutes  fluconAZOLE   Tablet 100 milliGRAM(s) Oral <User Schedule>  glucagon  Injectable 1 milliGRAM(s) IntraMuscular once  heparin   Injectable 5000 Unit(s) SubCutaneous every 8 hours  hydrALAZINE 100 milliGRAM(s) Oral every 8 hours  insulin glargine Injectable (LANTUS) 33 Unit(s) SubCutaneous at bedtime  insulin lispro (ADMELOG) corrective regimen sliding scale   SubCutaneous three times a day before meals  insulin lispro (ADMELOG) corrective regimen sliding scale   SubCutaneous <User Schedule>  insulin lispro Injectable (ADMELOG) 20 Unit(s) SubCutaneous before breakfast  insulin lispro Injectable (ADMELOG) 17 Unit(s) SubCutaneous before dinner  insulin lispro Injectable (ADMELOG) 17 Unit(s) SubCutaneous before lunch  insulin regular Infusion 3 Unit(s)/Hr (3 mL/Hr) IV Continuous <Continuous>  lidocaine   4% Patch 3 Patch Transdermal daily  magnesium oxide 400 milliGRAM(s) Oral three times a day with meals  melatonin 5 milliGRAM(s) Oral at bedtime  mycophenolate mofetil 1000 milliGRAM(s) Oral every 12 hours  NIFEdipine XL 60 milliGRAM(s) Oral every 12 hours  OLANZapine 5 milliGRAM(s) Oral at bedtime  pantoprazole    Tablet 40 milliGRAM(s) Oral every 24 hours  polyethylene glycol 3350 17 Gram(s) Oral daily  predniSONE   Tablet 15 milliGRAM(s) Oral every 12 hours  senna 2 Tablet(s) Oral at bedtime  tacrolimus 6.5 milliGRAM(s) Oral <User Schedule>  trimethoprim   80 mG/sulfamethoxazole 400 mG 1 Tablet(s) Oral daily  valGANciclovir 450 milliGRAM(s) Oral every 12 hours    MEDICATIONS  (PRN):  acetaminophen     Tablet .. 650 milliGRAM(s) Oral every 6 hours PRN Mild pain  dextrose Oral Gel 15 Gram(s) Oral once PRN Blood Glucose LESS THAN 70 milliGRAM(s)/deciliter    Allergies    penicillins (Unknown)    Intolerances        LABS:                                                                11.5   12.02 )-----------( 232      ( 08 Jan 2024 07:20 )             33.1   01-08    133<L>  |  89<L>  |  57<H>  ----------------------------<  133<H>  4.2   |  24  |  1.43<H>    Ca    9.5      08 Jan 2024 07:14  Phos  4.4     01-08  Mg     2.3     01-08    TPro  6.5  /  Alb  3.9  /  TBili  0.4  /  DBili  x   /  AST  24  /  ALT  48<H>  /  AlkPhos  58  01-08      < from: CT Chest No Cont (01.05.24 @ 09:25) >  IMPRESSION:  Question mild stercoral colitis.  Expected postoperative changes in the chest. No fluid collection.        --- End of Report ---    < end of copied text >      < from: CT Abdomen and Pelvis No Cont (11.28.23 @ 03:38) >  IMPRESSION:  Mildly dilated colon and prominent but not overly dilated small bowel   with air-fluid levels. No discrete transition point. Findings are   suggestive of ileus.    Intra-aortic balloon pump with the inferior marker at the level of the   inferior mesenteric artery and the balloon overlying the origins of the   renal arteries, celiac artery, and superior mesenteric artery. Consider   repositioning. Concern for IABP positioning was discussed with LANETTE Hawthorne   on 11/28/2023 at 3:42 AM by Dr. Shepard.    < end of copied text >          < from: Colonoscopy (11.17.23 @ 10:35) >                                                                                                        Impression:          - The entire examined colon is normal on direct and retroflexion views.                       - No specimens collected.  Recommendation:      - Resume previous diet today.                       - No large polyps or masses detected, No objection from GI standpoint to                 proceed with heart transplantation/advanced heart therapies.                       - Please call back should any further questions or concerns arise.    < end of copied text >     < from: Xray Chest 1 View- PORTABLE-Urgent (11.01.23 @ 07:42) >    IMPRESSION:  Clear lungs.    ---End of Report ---        < end of copied text >  < from: TTE W or WO Ultrasound Enhancing Agent (11.01.23 @ 10:23) >  _____________________________     CONCLUSIONS:      1. Left ventricular cavity is moderately dilated. Left ventricular wall thickness is normal. Left ventricular systolic function is severely decreased with an ejection fraction of 32 % by Chinchilla's method of disks. Regional wall motion abnormalities present.   2. Multiple segmental abnormalities exist. See findings.   3. There is moderate (grade 2) left ventricular diastolic dysfunction, with indeterminant filling pressure.   4. Normal right ventricular cavity size, wall thickness, and systolic function.   5. No significant valvular disease.   6. No pericardial effusion seen.   7. Compared to the transthoracic echocardiogram performed on 1/25/2017 the areas of akinesis are unchanged but there has been a decline in LV systolic function with new areas of hypokinesis.    __________________________________________________________________    < end of copied text >  < from: TTE Limited W or WO Ultrasound Enhancing Agent (11.02.23 @ 07:41) >  __________________________     CONCLUSIONS:      1. After obtaining consent, Definity ultrasound enhancing agent was given for enhanced left ventricular opacification and improved delineation of the left ventricular endocardial borders. Left ventricular systolic function is severely decreased with a calculated ejection fraction of 22 % by the Chinchilla's biplane method of disks. There is a left ventricular thrombus.   2. Findings were discussed with Litzy BOSS on 11/2/2023 at 8.49am.   3. There is a left ventricular thrombus.    _________________________________________________________________    < end of copied text >   LD VALENCIA  58y Male  MRN:[id number]    Patient is a 58y old  Male who presents with a chief complaint of NSTEMI (01 Nov 2023 20:29)    HPI:  59yo M w/ hx HTN, CAD w/ 1 stent in 2009, ICH (2008) presenting with abn ekg. Patient presented to [facility] where he was found to have STEMI, recommended to get cath however patient did not want to get it there so it left and came here.  Patient initially had cough, congestion, fever, was placed on antibiotics on Sunday.  Started feeling nauseous and had a presyncopal event after which he presented to ED last night.  Had chest pain as well.  Chest pain is midsternal.  Not currently having chest pain.  Received 4 aspirin 30 min pta. (01 Nov 2023 15:11)      Patient seen and evaluated at bedside. interval events noted    Interval HPI:   no acute events o/n     PAST MEDICAL & SURGICAL HISTORY:  HTN (hypertension)      CAD (coronary artery disease)  2009; stent      Intracranial hemorrhage  2008      Respiratory arrest  december 1st      Myocardial infarction, unspecified MI type, unspecified artery      History of coronary artery stent placement          REVIEW OF SYSTEMS:  as per hpi     VITALS:   Vital Signs Last 24 Hrs  T(C): 36.7 (08 Jan 2024 04:49), Max: 36.7 (08 Jan 2024 04:49)  T(F): 98.1 (08 Jan 2024 04:49), Max: 98.1 (08 Jan 2024 04:49)  HR: 91 (08 Jan 2024 04:49) (91 - 97)  BP: 96/61 (08 Jan 2024 04:49) (96/61 - 125/86)  BP(mean): 71 (08 Jan 2024 04:49) (71 - 99)  RR: 18 (08 Jan 2024 04:49) (18 - 18)  SpO2: 93% (08 Jan 2024 04:49) (93% - 99%)    Parameters below as of 08 Jan 2024 04:49  Patient On (Oxygen Delivery Method): room air          PHYSICAL EXAM:  GENERAL: NAD, comfortable.    HEAD:  Atraumatic, Normocephalic  EYES: EOMI, PERRLA, conjunctiva and sclera clear  NECK:  No JVD.    CHEST/LUNG: Clear to auscultation bilaterally; No wheeze    HEART: Regular rate and rhythm; No murmurs, rubs, or gallops  ABDOMEN: Soft, Nontender, Nondistended; Bowel sounds present  EXTREMITIES:  2+ Peripheral Pulses, No clubbing, cyanosis, or edema  NEUROLOGY: a0x3          Consultant(s) Notes Reviewed:  [x ] YES  [ ] NO  Care Discussed with Consultants/Other Providers [ x] YES  [ ] NO    MEDS:   MEDICATIONS  (STANDING):  budesonide  80 MICROgram(s)/formoterol 4.5 MICROgram(s) Inhaler 1 Puff(s) Inhalation two times a day  chlorhexidine 4% Liquid 1 Application(s) Topical <User Schedule>  dextrose 5%. 1000 milliLiter(s) (100 mL/Hr) IV Continuous <Continuous>  dextrose 5%. 1000 milliLiter(s) (50 mL/Hr) IV Continuous <Continuous>  dextrose 50% Injectable 50 milliLiter(s) IV Push every 15 minutes  fluconAZOLE   Tablet 100 milliGRAM(s) Oral <User Schedule>  glucagon  Injectable 1 milliGRAM(s) IntraMuscular once  heparin   Injectable 5000 Unit(s) SubCutaneous every 8 hours  hydrALAZINE 100 milliGRAM(s) Oral every 8 hours  insulin glargine Injectable (LANTUS) 33 Unit(s) SubCutaneous at bedtime  insulin lispro (ADMELOG) corrective regimen sliding scale   SubCutaneous three times a day before meals  insulin lispro (ADMELOG) corrective regimen sliding scale   SubCutaneous <User Schedule>  insulin lispro Injectable (ADMELOG) 20 Unit(s) SubCutaneous before breakfast  insulin lispro Injectable (ADMELOG) 17 Unit(s) SubCutaneous before dinner  insulin lispro Injectable (ADMELOG) 17 Unit(s) SubCutaneous before lunch  insulin regular Infusion 3 Unit(s)/Hr (3 mL/Hr) IV Continuous <Continuous>  lidocaine   4% Patch 3 Patch Transdermal daily  magnesium oxide 400 milliGRAM(s) Oral three times a day with meals  melatonin 5 milliGRAM(s) Oral at bedtime  mycophenolate mofetil 1000 milliGRAM(s) Oral every 12 hours  NIFEdipine XL 60 milliGRAM(s) Oral every 12 hours  OLANZapine 5 milliGRAM(s) Oral at bedtime  pantoprazole    Tablet 40 milliGRAM(s) Oral every 24 hours  polyethylene glycol 3350 17 Gram(s) Oral daily  predniSONE   Tablet 15 milliGRAM(s) Oral every 12 hours  senna 2 Tablet(s) Oral at bedtime  tacrolimus 6.5 milliGRAM(s) Oral <User Schedule>  trimethoprim   80 mG/sulfamethoxazole 400 mG 1 Tablet(s) Oral daily  valGANciclovir 450 milliGRAM(s) Oral every 12 hours    MEDICATIONS  (PRN):  acetaminophen     Tablet .. 650 milliGRAM(s) Oral every 6 hours PRN Mild pain  dextrose Oral Gel 15 Gram(s) Oral once PRN Blood Glucose LESS THAN 70 milliGRAM(s)/deciliter    Allergies    penicillins (Unknown)    Intolerances        LABS:                                                                11.5   12.02 )-----------( 232      ( 08 Jan 2024 07:20 )             33.1   01-08    133<L>  |  89<L>  |  57<H>  ----------------------------<  133<H>  4.2   |  24  |  1.43<H>    Ca    9.5      08 Jan 2024 07:14  Phos  4.4     01-08  Mg     2.3     01-08    TPro  6.5  /  Alb  3.9  /  TBili  0.4  /  DBili  x   /  AST  24  /  ALT  48<H>  /  AlkPhos  58  01-08      < from: CT Chest No Cont (01.05.24 @ 09:25) >  IMPRESSION:  Question mild stercoral colitis.  Expected postoperative changes in the chest. No fluid collection.        --- End of Report ---    < end of copied text >      < from: CT Abdomen and Pelvis No Cont (11.28.23 @ 03:38) >  IMPRESSION:  Mildly dilated colon and prominent but not overly dilated small bowel   with air-fluid levels. No discrete transition point. Findings are   suggestive of ileus.    Intra-aortic balloon pump with the inferior marker at the level of the   inferior mesenteric artery and the balloon overlying the origins of the   renal arteries, celiac artery, and superior mesenteric artery. Consider   repositioning. Concern for IABP positioning was discussed with LANETTE Hawthorne   on 11/28/2023 at 3:42 AM by Dr. Shepard.    < end of copied text >          < from: Colonoscopy (11.17.23 @ 10:35) >                                                                                                        Impression:          - The entire examined colon is normal on direct and retroflexion views.                       - No specimens collected.  Recommendation:      - Resume previous diet today.                       - No large polyps or masses detected, No objection from GI standpoint to                 proceed with heart transplantation/advanced heart therapies.                       - Please call back should any further questions or concerns arise.    < end of copied text >     < from: Xray Chest 1 View- PORTABLE-Urgent (11.01.23 @ 07:42) >    IMPRESSION:  Clear lungs.    ---End of Report ---        < end of copied text >  < from: TTE W or WO Ultrasound Enhancing Agent (11.01.23 @ 10:23) >  _____________________________     CONCLUSIONS:      1. Left ventricular cavity is moderately dilated. Left ventricular wall thickness is normal. Left ventricular systolic function is severely decreased with an ejection fraction of 32 % by Chinchilla's method of disks. Regional wall motion abnormalities present.   2. Multiple segmental abnormalities exist. See findings.   3. There is moderate (grade 2) left ventricular diastolic dysfunction, with indeterminant filling pressure.   4. Normal right ventricular cavity size, wall thickness, and systolic function.   5. No significant valvular disease.   6. No pericardial effusion seen.   7. Compared to the transthoracic echocardiogram performed on 1/25/2017 the areas of akinesis are unchanged but there has been a decline in LV systolic function with new areas of hypokinesis.    __________________________________________________________________    < end of copied text >  < from: TTE Limited W or WO Ultrasound Enhancing Agent (11.02.23 @ 07:41) >  __________________________     CONCLUSIONS:      1. After obtaining consent, Definity ultrasound enhancing agent was given for enhanced left ventricular opacification and improved delineation of the left ventricular endocardial borders. Left ventricular systolic function is severely decreased with a calculated ejection fraction of 22 % by the Chinchilla's biplane method of disks. There is a left ventricular thrombus.   2. Findings were discussed with Litzy BOSS on 11/2/2023 at 8.49am.   3. There is a left ventricular thrombus.    _________________________________________________________________    < end of copied text >

## 2024-01-09 ENCOUNTER — APPOINTMENT (OUTPATIENT)
Dept: CV DIAGNOSITCS | Facility: HOSPITAL | Age: 59
End: 2024-01-09

## 2024-01-09 ENCOUNTER — RESULT REVIEW (OUTPATIENT)
Age: 59
End: 2024-01-09

## 2024-01-09 ENCOUNTER — TRANSCRIPTION ENCOUNTER (OUTPATIENT)
Age: 59
End: 2024-01-09

## 2024-01-09 VITALS
HEART RATE: 92 BPM | TEMPERATURE: 98 F | OXYGEN SATURATION: 97 % | SYSTOLIC BLOOD PRESSURE: 113 MMHG | RESPIRATION RATE: 18 BRPM | DIASTOLIC BLOOD PRESSURE: 72 MMHG

## 2024-01-09 DIAGNOSIS — R73.9 HYPERGLYCEMIA, UNSPECIFIED: ICD-10-CM

## 2024-01-09 LAB
ALBUMIN SERPL ELPH-MCNC: 3.8 G/DL — SIGNIFICANT CHANGE UP (ref 3.3–5)
ALBUMIN SERPL ELPH-MCNC: 3.8 G/DL — SIGNIFICANT CHANGE UP (ref 3.3–5)
ALP SERPL-CCNC: 58 U/L — SIGNIFICANT CHANGE UP (ref 40–120)
ALP SERPL-CCNC: 58 U/L — SIGNIFICANT CHANGE UP (ref 40–120)
ALT FLD-CCNC: 68 U/L — HIGH (ref 10–45)
ALT FLD-CCNC: 68 U/L — HIGH (ref 10–45)
ANION GAP SERPL CALC-SCNC: 15 MMOL/L — SIGNIFICANT CHANGE UP (ref 5–17)
ANION GAP SERPL CALC-SCNC: 15 MMOL/L — SIGNIFICANT CHANGE UP (ref 5–17)
AST SERPL-CCNC: 42 U/L — HIGH (ref 10–40)
AST SERPL-CCNC: 42 U/L — HIGH (ref 10–40)
BASOPHILS # BLD AUTO: 0 K/UL — SIGNIFICANT CHANGE UP (ref 0–0.2)
BASOPHILS # BLD AUTO: 0 K/UL — SIGNIFICANT CHANGE UP (ref 0–0.2)
BASOPHILS NFR BLD AUTO: 0 % — SIGNIFICANT CHANGE UP (ref 0–2)
BASOPHILS NFR BLD AUTO: 0 % — SIGNIFICANT CHANGE UP (ref 0–2)
BILIRUB SERPL-MCNC: 0.2 MG/DL — SIGNIFICANT CHANGE UP (ref 0.2–1.2)
BILIRUB SERPL-MCNC: 0.2 MG/DL — SIGNIFICANT CHANGE UP (ref 0.2–1.2)
BUN SERPL-MCNC: 44 MG/DL — HIGH (ref 7–23)
BUN SERPL-MCNC: 44 MG/DL — HIGH (ref 7–23)
CALCIUM SERPL-MCNC: 9.5 MG/DL — SIGNIFICANT CHANGE UP (ref 8.4–10.5)
CALCIUM SERPL-MCNC: 9.5 MG/DL — SIGNIFICANT CHANGE UP (ref 8.4–10.5)
CHLORIDE SERPL-SCNC: 94 MMOL/L — LOW (ref 96–108)
CHLORIDE SERPL-SCNC: 94 MMOL/L — LOW (ref 96–108)
CMV DNA CSF QL NAA+PROBE: SIGNIFICANT CHANGE UP IU/ML
CMV DNA CSF QL NAA+PROBE: SIGNIFICANT CHANGE UP IU/ML
CMV DNA SPEC NAA+PROBE-LOG#: SIGNIFICANT CHANGE UP LOG10IU/ML
CMV DNA SPEC NAA+PROBE-LOG#: SIGNIFICANT CHANGE UP LOG10IU/ML
CO2 SERPL-SCNC: 23 MMOL/L — SIGNIFICANT CHANGE UP (ref 22–31)
CO2 SERPL-SCNC: 23 MMOL/L — SIGNIFICANT CHANGE UP (ref 22–31)
CREAT SERPL-MCNC: 1.17 MG/DL — SIGNIFICANT CHANGE UP (ref 0.5–1.3)
CREAT SERPL-MCNC: 1.17 MG/DL — SIGNIFICANT CHANGE UP (ref 0.5–1.3)
EGFR: 72 ML/MIN/1.73M2 — SIGNIFICANT CHANGE UP
EGFR: 72 ML/MIN/1.73M2 — SIGNIFICANT CHANGE UP
EOSINOPHIL # BLD AUTO: 0.02 K/UL — SIGNIFICANT CHANGE UP (ref 0–0.5)
EOSINOPHIL # BLD AUTO: 0.02 K/UL — SIGNIFICANT CHANGE UP (ref 0–0.5)
EOSINOPHIL NFR BLD AUTO: 0.3 % — SIGNIFICANT CHANGE UP (ref 0–6)
EOSINOPHIL NFR BLD AUTO: 0.3 % — SIGNIFICANT CHANGE UP (ref 0–6)
GLUCOSE BLDC GLUCOMTR-MCNC: 232 MG/DL — HIGH (ref 70–99)
GLUCOSE BLDC GLUCOMTR-MCNC: 232 MG/DL — HIGH (ref 70–99)
GLUCOSE BLDC GLUCOMTR-MCNC: 255 MG/DL — HIGH (ref 70–99)
GLUCOSE BLDC GLUCOMTR-MCNC: 255 MG/DL — HIGH (ref 70–99)
GLUCOSE BLDC GLUCOMTR-MCNC: 304 MG/DL — HIGH (ref 70–99)
GLUCOSE BLDC GLUCOMTR-MCNC: 304 MG/DL — HIGH (ref 70–99)
GLUCOSE BLDC GLUCOMTR-MCNC: 305 MG/DL — HIGH (ref 70–99)
GLUCOSE BLDC GLUCOMTR-MCNC: 305 MG/DL — HIGH (ref 70–99)
GLUCOSE SERPL-MCNC: 313 MG/DL — HIGH (ref 70–99)
GLUCOSE SERPL-MCNC: 313 MG/DL — HIGH (ref 70–99)
HCT VFR BLD CALC: 29.9 % — LOW (ref 39–50)
HCT VFR BLD CALC: 29.9 % — LOW (ref 39–50)
HGB BLD-MCNC: 9.8 G/DL — LOW (ref 13–17)
HGB BLD-MCNC: 9.8 G/DL — LOW (ref 13–17)
HGB FLD-MCNC: 9.4 G/DL — LOW (ref 12.6–17.4)
HGB FLD-MCNC: 9.4 G/DL — LOW (ref 12.6–17.4)
HGB FLD-MCNC: 9.6 G/DL — LOW (ref 12.6–17.4)
HGB FLD-MCNC: 9.6 G/DL — LOW (ref 12.6–17.4)
IMM GRANULOCYTES NFR BLD AUTO: 0.5 % — SIGNIFICANT CHANGE UP (ref 0–0.9)
IMM GRANULOCYTES NFR BLD AUTO: 0.5 % — SIGNIFICANT CHANGE UP (ref 0–0.9)
LYMPHOCYTES # BLD AUTO: 0.89 K/UL — LOW (ref 1–3.3)
LYMPHOCYTES # BLD AUTO: 0.89 K/UL — LOW (ref 1–3.3)
LYMPHOCYTES # BLD AUTO: 11.1 % — LOW (ref 13–44)
LYMPHOCYTES # BLD AUTO: 11.1 % — LOW (ref 13–44)
MAGNESIUM SERPL-MCNC: 2.1 MG/DL — SIGNIFICANT CHANGE UP (ref 1.6–2.6)
MAGNESIUM SERPL-MCNC: 2.1 MG/DL — SIGNIFICANT CHANGE UP (ref 1.6–2.6)
MCHC RBC-ENTMCNC: 30.1 PG — SIGNIFICANT CHANGE UP (ref 27–34)
MCHC RBC-ENTMCNC: 30.1 PG — SIGNIFICANT CHANGE UP (ref 27–34)
MCHC RBC-ENTMCNC: 32.8 GM/DL — SIGNIFICANT CHANGE UP (ref 32–36)
MCHC RBC-ENTMCNC: 32.8 GM/DL — SIGNIFICANT CHANGE UP (ref 32–36)
MCV RBC AUTO: 91.7 FL — SIGNIFICANT CHANGE UP (ref 80–100)
MCV RBC AUTO: 91.7 FL — SIGNIFICANT CHANGE UP (ref 80–100)
MONOCYTES # BLD AUTO: 0.33 K/UL — SIGNIFICANT CHANGE UP (ref 0–0.9)
MONOCYTES # BLD AUTO: 0.33 K/UL — SIGNIFICANT CHANGE UP (ref 0–0.9)
MONOCYTES NFR BLD AUTO: 4.1 % — SIGNIFICANT CHANGE UP (ref 2–14)
MONOCYTES NFR BLD AUTO: 4.1 % — SIGNIFICANT CHANGE UP (ref 2–14)
NEUTROPHILS # BLD AUTO: 6.71 K/UL — SIGNIFICANT CHANGE UP (ref 1.8–7.4)
NEUTROPHILS # BLD AUTO: 6.71 K/UL — SIGNIFICANT CHANGE UP (ref 1.8–7.4)
NEUTROPHILS NFR BLD AUTO: 84 % — HIGH (ref 43–77)
NEUTROPHILS NFR BLD AUTO: 84 % — HIGH (ref 43–77)
NRBC # BLD: 0 /100 WBCS — SIGNIFICANT CHANGE UP (ref 0–0)
NRBC # BLD: 0 /100 WBCS — SIGNIFICANT CHANGE UP (ref 0–0)
OXYHGB MFR BLDMV: 66.7 % — LOW (ref 90–95)
OXYHGB MFR BLDMV: 66.7 % — LOW (ref 90–95)
OXYHGB MFR BLDMV: 67.4 % — LOW (ref 90–95)
OXYHGB MFR BLDMV: 67.4 % — LOW (ref 90–95)
PHOSPHATE SERPL-MCNC: 2.1 MG/DL — LOW (ref 2.5–4.5)
PHOSPHATE SERPL-MCNC: 2.1 MG/DL — LOW (ref 2.5–4.5)
PLATELET # BLD AUTO: 200 K/UL — SIGNIFICANT CHANGE UP (ref 150–400)
PLATELET # BLD AUTO: 200 K/UL — SIGNIFICANT CHANGE UP (ref 150–400)
POTASSIUM SERPL-MCNC: 4.6 MMOL/L — SIGNIFICANT CHANGE UP (ref 3.5–5.3)
POTASSIUM SERPL-MCNC: 4.6 MMOL/L — SIGNIFICANT CHANGE UP (ref 3.5–5.3)
POTASSIUM SERPL-SCNC: 4.6 MMOL/L — SIGNIFICANT CHANGE UP (ref 3.5–5.3)
POTASSIUM SERPL-SCNC: 4.6 MMOL/L — SIGNIFICANT CHANGE UP (ref 3.5–5.3)
PROT SERPL-MCNC: 5.9 G/DL — LOW (ref 6–8.3)
PROT SERPL-MCNC: 5.9 G/DL — LOW (ref 6–8.3)
RBC # BLD: 3.26 M/UL — LOW (ref 4.2–5.8)
RBC # BLD: 3.26 M/UL — LOW (ref 4.2–5.8)
RBC # FLD: 16.7 % — HIGH (ref 10.3–14.5)
RBC # FLD: 16.7 % — HIGH (ref 10.3–14.5)
SAO2 % BLD: 68.1 % — SIGNIFICANT CHANGE UP (ref 60–90)
SAO2 % BLD: 68.1 % — SIGNIFICANT CHANGE UP (ref 60–90)
SAO2 % BLD: 68.9 % — SIGNIFICANT CHANGE UP (ref 60–90)
SAO2 % BLD: 68.9 % — SIGNIFICANT CHANGE UP (ref 60–90)
SODIUM SERPL-SCNC: 132 MMOL/L — LOW (ref 135–145)
SODIUM SERPL-SCNC: 132 MMOL/L — LOW (ref 135–145)
TACROLIMUS SERPL-MCNC: 13.2 NG/ML — SIGNIFICANT CHANGE UP
TACROLIMUS SERPL-MCNC: 13.2 NG/ML — SIGNIFICANT CHANGE UP
WBC # BLD: 7.99 K/UL — SIGNIFICANT CHANGE UP (ref 3.8–10.5)
WBC # BLD: 7.99 K/UL — SIGNIFICANT CHANGE UP (ref 3.8–10.5)
WBC # FLD AUTO: 7.99 K/UL — SIGNIFICANT CHANGE UP (ref 3.8–10.5)
WBC # FLD AUTO: 7.99 K/UL — SIGNIFICANT CHANGE UP (ref 3.8–10.5)

## 2024-01-09 PROCEDURE — 82962 GLUCOSE BLOOD TEST: CPT

## 2024-01-09 PROCEDURE — 83550 IRON BINDING TEST: CPT

## 2024-01-09 PROCEDURE — 94002 VENT MGMT INPAT INIT DAY: CPT

## 2024-01-09 PROCEDURE — 82150 ASSAY OF AMYLASE: CPT

## 2024-01-09 PROCEDURE — 84550 ASSAY OF BLOOD/URIC ACID: CPT

## 2024-01-09 PROCEDURE — 93978 VASCULAR STUDY: CPT

## 2024-01-09 PROCEDURE — 82330 ASSAY OF CALCIUM: CPT

## 2024-01-09 PROCEDURE — 83615 LACTATE (LD) (LDH) ENZYME: CPT

## 2024-01-09 PROCEDURE — 84105 ASSAY OF URINE PHOSPHORUS: CPT

## 2024-01-09 PROCEDURE — 97168 OT RE-EVAL EST PLAN CARE: CPT

## 2024-01-09 PROCEDURE — 86704 HEP B CORE ANTIBODY TOTAL: CPT

## 2024-01-09 PROCEDURE — 74018 RADEX ABDOMEN 1 VIEW: CPT

## 2024-01-09 PROCEDURE — 93321 DOPPLER ECHO F-UP/LMTD STD: CPT

## 2024-01-09 PROCEDURE — C8929: CPT

## 2024-01-09 PROCEDURE — P9047: CPT

## 2024-01-09 PROCEDURE — 87536 HIV-1 QUANT&REVRSE TRNSCRPJ: CPT

## 2024-01-09 PROCEDURE — C1889: CPT

## 2024-01-09 PROCEDURE — 81003 URINALYSIS AUTO W/O SCOPE: CPT

## 2024-01-09 PROCEDURE — 85014 HEMATOCRIT: CPT

## 2024-01-09 PROCEDURE — 87522 HEPATITIS C REVRS TRNSCRPJ: CPT

## 2024-01-09 PROCEDURE — 86708 HEPATITIS A ANTIBODY: CPT

## 2024-01-09 PROCEDURE — 86036 ANCA SCREEN EACH ANTIBODY: CPT

## 2024-01-09 PROCEDURE — 74176 CT ABD & PELVIS W/O CONTRAST: CPT

## 2024-01-09 PROCEDURE — 85384 FIBRINOGEN ACTIVITY: CPT

## 2024-01-09 PROCEDURE — 86334 IMMUNOFIX E-PHORESIS SERUM: CPT

## 2024-01-09 PROCEDURE — 86692 HEPATITIS DELTA AGENT ANTBDY: CPT

## 2024-01-09 PROCEDURE — 93923 UPR/LXTR ART STDY 3+ LVLS: CPT

## 2024-01-09 PROCEDURE — 84133 ASSAY OF URINE POTASSIUM: CPT

## 2024-01-09 PROCEDURE — 88311 DECALCIFY TISSUE: CPT

## 2024-01-09 PROCEDURE — 94010 BREATHING CAPACITY TEST: CPT

## 2024-01-09 PROCEDURE — 87070 CULTURE OTHR SPECIMN AEROBIC: CPT

## 2024-01-09 PROCEDURE — 97530 THERAPEUTIC ACTIVITIES: CPT

## 2024-01-09 PROCEDURE — 86965 POOLING BLOOD PLATELETS: CPT

## 2024-01-09 PROCEDURE — 87799 DETECT AGENT NOS DNA QUANT: CPT

## 2024-01-09 PROCEDURE — 83690 ASSAY OF LIPASE: CPT

## 2024-01-09 PROCEDURE — 84484 ASSAY OF TROPONIN QUANT: CPT

## 2024-01-09 PROCEDURE — 83036 HEMOGLOBIN GLYCOSYLATED A1C: CPT

## 2024-01-09 PROCEDURE — 93505 ENDOMYOCARDIAL BIOPSY: CPT

## 2024-01-09 PROCEDURE — 82565 ASSAY OF CREATININE: CPT

## 2024-01-09 PROCEDURE — 84481 FREE ASSAY (FT-3): CPT

## 2024-01-09 PROCEDURE — 86905 BLOOD TYPING RBC ANTIGENS: CPT

## 2024-01-09 PROCEDURE — 86255 FLUORESCENT ANTIBODY SCREEN: CPT

## 2024-01-09 PROCEDURE — 86682 HELMINTH ANTIBODY: CPT

## 2024-01-09 PROCEDURE — 86765 RUBEOLA ANTIBODY: CPT

## 2024-01-09 PROCEDURE — 87186 SC STD MICRODIL/AGAR DIL: CPT

## 2024-01-09 PROCEDURE — 92610 EVALUATE SWALLOWING FUNCTION: CPT

## 2024-01-09 PROCEDURE — 87077 CULTURE AEROBIC IDENTIFY: CPT

## 2024-01-09 PROCEDURE — 80053 COMPREHEN METABOLIC PANEL: CPT

## 2024-01-09 PROCEDURE — 80202 ASSAY OF VANCOMYCIN: CPT

## 2024-01-09 PROCEDURE — 84443 ASSAY THYROID STIM HORMONE: CPT

## 2024-01-09 PROCEDURE — 83880 ASSAY OF NATRIURETIC PEPTIDE: CPT

## 2024-01-09 PROCEDURE — P9073: CPT

## 2024-01-09 PROCEDURE — C8924: CPT

## 2024-01-09 PROCEDURE — 93880 EXTRACRANIAL BILAT STUDY: CPT

## 2024-01-09 PROCEDURE — 86644 CMV ANTIBODY: CPT

## 2024-01-09 PROCEDURE — 86038 ANTINUCLEAR ANTIBODIES: CPT

## 2024-01-09 PROCEDURE — 83521 IG LIGHT CHAINS FREE EACH: CPT

## 2024-01-09 PROCEDURE — 87150 DNA/RNA AMPLIFIED PROBE: CPT

## 2024-01-09 PROCEDURE — 83735 ASSAY OF MAGNESIUM: CPT

## 2024-01-09 PROCEDURE — 88307 TISSUE EXAM BY PATHOLOGIST: CPT | Mod: 26

## 2024-01-09 PROCEDURE — 84165 PROTEIN E-PHORESIS SERUM: CPT

## 2024-01-09 PROCEDURE — 93505 ENDOMYOCARDIAL BIOPSY: CPT | Mod: 26

## 2024-01-09 PROCEDURE — 86696 HERPES SIMPLEX TYPE 2 TEST: CPT

## 2024-01-09 PROCEDURE — 84100 ASSAY OF PHOSPHORUS: CPT

## 2024-01-09 PROCEDURE — 90739 HEPB VACC 2/4 DOSE ADULT IM: CPT

## 2024-01-09 PROCEDURE — 86140 C-REACTIVE PROTEIN: CPT

## 2024-01-09 PROCEDURE — 93321 DOPPLER ECHO F-UP/LMTD STD: CPT | Mod: 26

## 2024-01-09 PROCEDURE — 80048 BASIC METABOLIC PNL TOTAL CA: CPT

## 2024-01-09 PROCEDURE — 83605 ASSAY OF LACTIC ACID: CPT

## 2024-01-09 PROCEDURE — 87075 CULTR BACTERIA EXCEPT BLOOD: CPT

## 2024-01-09 PROCEDURE — 84300 ASSAY OF URINE SODIUM: CPT

## 2024-01-09 PROCEDURE — 71045 X-RAY EXAM CHEST 1 VIEW: CPT | Mod: 26

## 2024-01-09 PROCEDURE — 84439 ASSAY OF FREE THYROXINE: CPT

## 2024-01-09 PROCEDURE — 86803 HEPATITIS C AB TEST: CPT

## 2024-01-09 PROCEDURE — 85025 COMPLETE CBC W/AUTO DIFF WBC: CPT

## 2024-01-09 PROCEDURE — 31720 CLEARANCE OF AIRWAYS: CPT

## 2024-01-09 PROCEDURE — 93971 EXTREMITY STUDY: CPT

## 2024-01-09 PROCEDURE — 96374 THER/PROPH/DIAG INJ IV PUSH: CPT

## 2024-01-09 PROCEDURE — 87205 SMEAR GRAM STAIN: CPT

## 2024-01-09 PROCEDURE — 86790 VIRUS ANTIBODY NOS: CPT

## 2024-01-09 PROCEDURE — 99233 SBSQ HOSP IP/OBS HIGH 50: CPT | Mod: 25

## 2024-01-09 PROCEDURE — 76981 USE PARENCHYMA: CPT

## 2024-01-09 PROCEDURE — 90632 HEPA VACCINE ADULT IM: CPT

## 2024-01-09 PROCEDURE — 87641 MR-STAPH DNA AMP PROBE: CPT

## 2024-01-09 PROCEDURE — 84145 PROCALCITONIN (PCT): CPT

## 2024-01-09 PROCEDURE — 86480 TB TEST CELL IMMUN MEASURE: CPT

## 2024-01-09 PROCEDURE — G0480: CPT

## 2024-01-09 PROCEDURE — 80307 DRUG TEST PRSMV CHEM ANLYZR: CPT

## 2024-01-09 PROCEDURE — 80076 HEPATIC FUNCTION PANEL: CPT

## 2024-01-09 PROCEDURE — 99233 SBSQ HOSP IP/OBS HIGH 50: CPT

## 2024-01-09 PROCEDURE — 93925 LOWER EXTREMITY STUDY: CPT

## 2024-01-09 PROCEDURE — 93308 TTE F-UP OR LMTD: CPT | Mod: 26

## 2024-01-09 PROCEDURE — 93970 EXTREMITY STUDY: CPT

## 2024-01-09 PROCEDURE — 86663 EPSTEIN-BARR ANTIBODY: CPT

## 2024-01-09 PROCEDURE — 86780 TREPONEMA PALLIDUM: CPT

## 2024-01-09 PROCEDURE — 94799 UNLISTED PULMONARY SVC/PX: CPT

## 2024-01-09 PROCEDURE — 82803 BLOOD GASES ANY COMBINATION: CPT

## 2024-01-09 PROCEDURE — 90750 HZV VACC RECOMBINANT IM: CPT

## 2024-01-09 PROCEDURE — 86787 VARICELLA-ZOSTER ANTIBODY: CPT

## 2024-01-09 PROCEDURE — 86695 HERPES SIMPLEX TYPE 1 TEST: CPT

## 2024-01-09 PROCEDURE — 99152 MOD SED SAME PHYS/QHP 5/>YRS: CPT

## 2024-01-09 PROCEDURE — 86762 RUBELLA ANTIBODY: CPT

## 2024-01-09 PROCEDURE — 94640 AIRWAY INHALATION TREATMENT: CPT

## 2024-01-09 PROCEDURE — 84295 ASSAY OF SERUM SODIUM: CPT

## 2024-01-09 PROCEDURE — 82010 KETONE BODYS QUAN: CPT

## 2024-01-09 PROCEDURE — 36415 COLL VENOUS BLD VENIPUNCTURE: CPT

## 2024-01-09 PROCEDURE — 84153 ASSAY OF PSA TOTAL: CPT

## 2024-01-09 PROCEDURE — 86665 EPSTEIN-BARR CAPSID VCA: CPT

## 2024-01-09 PROCEDURE — 88346 IMFLUOR 1ST 1ANTB STAIN PX: CPT | Mod: 26

## 2024-01-09 PROCEDURE — 84134 ASSAY OF PREALBUMIN: CPT

## 2024-01-09 PROCEDURE — 86664 EPSTEIN-BARR NUCLEAR ANTIGEN: CPT

## 2024-01-09 PROCEDURE — 80197 ASSAY OF TACROLIMUS: CPT

## 2024-01-09 PROCEDURE — 81001 URINALYSIS AUTO W/SCOPE: CPT

## 2024-01-09 PROCEDURE — 87640 STAPH A DNA AMP PROBE: CPT

## 2024-01-09 PROCEDURE — 76700 US EXAM ABDOM COMPLETE: CPT

## 2024-01-09 PROCEDURE — 85018 HEMOGLOBIN: CPT

## 2024-01-09 PROCEDURE — 87517 HEPATITIS B DNA QUANT: CPT

## 2024-01-09 PROCEDURE — 93308 TTE F-UP OR LMTD: CPT

## 2024-01-09 PROCEDURE — 76536 US EXAM OF HEAD AND NECK: CPT

## 2024-01-09 PROCEDURE — 86022 PLATELET ANTIBODIES: CPT

## 2024-01-09 PROCEDURE — 93005 ELECTROCARDIOGRAM TRACING: CPT

## 2024-01-09 PROCEDURE — 82435 ASSAY OF BLOOD CHLORIDE: CPT

## 2024-01-09 PROCEDURE — 88309 TISSUE EXAM BY PATHOLOGIST: CPT

## 2024-01-09 PROCEDURE — 82977 ASSAY OF GGT: CPT

## 2024-01-09 PROCEDURE — 86735 MUMPS ANTIBODY: CPT

## 2024-01-09 PROCEDURE — 85730 THROMBOPLASTIN TIME PARTIAL: CPT

## 2024-01-09 PROCEDURE — 97164 PT RE-EVAL EST PLAN CARE: CPT

## 2024-01-09 PROCEDURE — 85610 PROTHROMBIN TIME: CPT

## 2024-01-09 PROCEDURE — 87389 HIV-1 AG W/HIV-1&-2 AB AG IA: CPT

## 2024-01-09 PROCEDURE — 86891 AUTOLOGOUS BLOOD OP SALVAGE: CPT

## 2024-01-09 PROCEDURE — 84166 PROTEIN E-PHORESIS/URINE/CSF: CPT

## 2024-01-09 PROCEDURE — C1887: CPT

## 2024-01-09 PROCEDURE — 86706 HEP B SURFACE ANTIBODY: CPT

## 2024-01-09 PROCEDURE — 70450 CT HEAD/BRAIN W/O DYE: CPT

## 2024-01-09 PROCEDURE — 85027 COMPLETE CBC AUTOMATED: CPT

## 2024-01-09 PROCEDURE — 83935 ASSAY OF URINE OSMOLALITY: CPT

## 2024-01-09 PROCEDURE — 33967 INSERT I-AORT PERCUT DEVICE: CPT

## 2024-01-09 PROCEDURE — 86880 COOMBS TEST DIRECT: CPT

## 2024-01-09 PROCEDURE — 85396 CLOTTING ASSAY WHOLE BLOOD: CPT

## 2024-01-09 PROCEDURE — 97535 SELF CARE MNGMENT TRAINING: CPT

## 2024-01-09 PROCEDURE — 80061 LIPID PANEL: CPT

## 2024-01-09 PROCEDURE — 86777 TOXOPLASMA ANTIBODY: CPT

## 2024-01-09 PROCEDURE — 87040 BLOOD CULTURE FOR BACTERIA: CPT

## 2024-01-09 PROCEDURE — 82570 ASSAY OF URINE CREATININE: CPT

## 2024-01-09 PROCEDURE — 93975 VASCULAR STUDY: CPT

## 2024-01-09 PROCEDURE — C1769: CPT

## 2024-01-09 PROCEDURE — C1751: CPT

## 2024-01-09 PROCEDURE — 84156 ASSAY OF PROTEIN URINE: CPT

## 2024-01-09 PROCEDURE — C1894: CPT

## 2024-01-09 PROCEDURE — 86850 RBC ANTIBODY SCREEN: CPT

## 2024-01-09 PROCEDURE — 97162 PT EVAL MOD COMPLEX 30 MIN: CPT

## 2024-01-09 PROCEDURE — 86753 PROTOZOA ANTIBODY NOS: CPT

## 2024-01-09 PROCEDURE — P9045: CPT

## 2024-01-09 PROCEDURE — 97166 OT EVAL MOD COMPLEX 45 MIN: CPT

## 2024-01-09 PROCEDURE — 94003 VENT MGMT INPAT SUBQ DAY: CPT

## 2024-01-09 PROCEDURE — 85520 HEPARIN ASSAY: CPT

## 2024-01-09 PROCEDURE — 0225U NFCT DS DNA&RNA 21 SARSCOV2: CPT

## 2024-01-09 PROCEDURE — 82947 ASSAY GLUCOSE BLOOD QUANT: CPT

## 2024-01-09 PROCEDURE — P9012: CPT

## 2024-01-09 PROCEDURE — 93456 R HRT CORONARY ARTERY ANGIO: CPT

## 2024-01-09 PROCEDURE — 87635 SARS-COV-2 COVID-19 AMP PRB: CPT

## 2024-01-09 PROCEDURE — 86769 SARS-COV-2 COVID-19 ANTIBODY: CPT

## 2024-01-09 PROCEDURE — 83540 ASSAY OF IRON: CPT

## 2024-01-09 PROCEDURE — 86901 BLOOD TYPING SEROLOGIC RH(D): CPT

## 2024-01-09 PROCEDURE — 71250 CT THORAX DX C-: CPT

## 2024-01-09 PROCEDURE — 83516 IMMUNOASSAY NONANTIBODY: CPT

## 2024-01-09 PROCEDURE — 99232 SBSQ HOSP IP/OBS MODERATE 35: CPT

## 2024-01-09 PROCEDURE — 84132 ASSAY OF SERUM POTASSIUM: CPT

## 2024-01-09 PROCEDURE — 93926 LOWER EXTREMITY STUDY: CPT

## 2024-01-09 PROCEDURE — 97116 GAIT TRAINING THERAPY: CPT

## 2024-01-09 PROCEDURE — 71045 X-RAY EXAM CHEST 1 VIEW: CPT

## 2024-01-09 PROCEDURE — 86778 TOXOPLASMA ANTIBODY IGM: CPT

## 2024-01-09 PROCEDURE — 86431 RHEUMATOID FACTOR QUANT: CPT

## 2024-01-09 PROCEDURE — 90715 TDAP VACCINE 7 YRS/> IM: CPT

## 2024-01-09 PROCEDURE — C1729: CPT

## 2024-01-09 PROCEDURE — 86160 COMPLEMENT ANTIGEN: CPT

## 2024-01-09 PROCEDURE — 86923 COMPATIBILITY TEST ELECTRIC: CPT

## 2024-01-09 PROCEDURE — 87086 URINE CULTURE/COLONY COUNT: CPT

## 2024-01-09 PROCEDURE — 86900 BLOOD TYPING SEROLOGIC ABO: CPT

## 2024-01-09 PROCEDURE — 97110 THERAPEUTIC EXERCISES: CPT

## 2024-01-09 PROCEDURE — 85652 RBC SED RATE AUTOMATED: CPT

## 2024-01-09 PROCEDURE — 99285 EMERGENCY DEPT VISIT HI MDM: CPT | Mod: 25

## 2024-01-09 PROCEDURE — 82728 ASSAY OF FERRITIN: CPT

## 2024-01-09 PROCEDURE — 88346 IMFLUOR 1ST 1ANTB STAIN PX: CPT

## 2024-01-09 PROCEDURE — 93306 TTE W/DOPPLER COMPLETE: CPT

## 2024-01-09 PROCEDURE — 88307 TISSUE EXAM BY PATHOLOGIST: CPT

## 2024-01-09 RX ORDER — CLOPIDOGREL BISULFATE 75 MG/1
1 TABLET, FILM COATED ORAL
Refills: 0 | DISCHARGE

## 2024-01-09 RX ORDER — HYDROCHLOROTHIAZIDE 25 MG
1 TABLET ORAL
Qty: 0 | Refills: 0 | DISCHARGE

## 2024-01-09 RX ORDER — FLUCONAZOLE 150 MG/1
1 TABLET ORAL
Qty: 0 | Refills: 0 | DISCHARGE
Start: 2024-01-09

## 2024-01-09 RX ORDER — PANTOPRAZOLE SODIUM 20 MG/1
1 TABLET, DELAYED RELEASE ORAL
Qty: 0 | Refills: 0 | DISCHARGE
Start: 2024-01-09

## 2024-01-09 RX ORDER — VALGANCICLOVIR 450 MG/1
1 TABLET, FILM COATED ORAL
Qty: 0 | Refills: 0 | DISCHARGE
Start: 2024-01-09

## 2024-01-09 RX ORDER — LANOLIN ALCOHOL/MO/W.PET/CERES
1 CREAM (GRAM) TOPICAL
Qty: 0 | Refills: 0 | DISCHARGE
Start: 2024-01-09

## 2024-01-09 RX ORDER — BUDESONIDE AND FORMOTEROL FUMARATE DIHYDRATE 160; 4.5 UG/1; UG/1
1 AEROSOL RESPIRATORY (INHALATION)
Qty: 0 | Refills: 0 | DISCHARGE
Start: 2024-01-09

## 2024-01-09 RX ORDER — MAGNESIUM OXIDE 400 MG ORAL TABLET 241.3 MG
2 TABLET ORAL
Qty: 0 | Refills: 0 | DISCHARGE
Start: 2024-01-09

## 2024-01-09 RX ORDER — ALBUTEROL 90 UG/1
2 AEROSOL, METERED ORAL
Refills: 0 | DISCHARGE

## 2024-01-09 RX ORDER — NIFEDIPINE 30 MG
1 TABLET, EXTENDED RELEASE 24 HR ORAL
Qty: 0 | Refills: 0 | DISCHARGE
Start: 2024-01-09

## 2024-01-09 RX ORDER — DAPAGLIFLOZIN 10 MG/1
1 TABLET, FILM COATED ORAL
Refills: 0 | DISCHARGE

## 2024-01-09 RX ORDER — TACROLIMUS 5 MG/1
6 CAPSULE ORAL
Qty: 0 | Refills: 0 | DISCHARGE
Start: 2024-01-09

## 2024-01-09 RX ORDER — MYCOPHENOLATE MOFETIL 250 MG/1
1000 CAPSULE ORAL
Qty: 0 | Refills: 0 | DISCHARGE
Start: 2024-01-09

## 2024-01-09 RX ORDER — INSULIN LISPRO 100/ML
20 VIAL (ML) SUBCUTANEOUS
Qty: 0 | Refills: 0 | DISCHARGE
Start: 2024-01-09

## 2024-01-09 RX ORDER — CARVEDILOL PHOSPHATE 80 MG/1
1 CAPSULE, EXTENDED RELEASE ORAL
Qty: 0 | Refills: 0 | DISCHARGE

## 2024-01-09 RX ORDER — MONTELUKAST 4 MG/1
1 TABLET, CHEWABLE ORAL
Qty: 0 | Refills: 0 | DISCHARGE

## 2024-01-09 RX ORDER — INSULIN GLARGINE 100 [IU]/ML
30 INJECTION, SOLUTION SUBCUTANEOUS
Qty: 0 | Refills: 0 | DISCHARGE
Start: 2024-01-09

## 2024-01-09 RX ORDER — SEMAGLUTIDE 0.68 MG/ML
1 INJECTION, SOLUTION SUBCUTANEOUS
Refills: 0 | DISCHARGE

## 2024-01-09 RX ORDER — OLANZAPINE 15 MG/1
1 TABLET, FILM COATED ORAL
Qty: 0 | Refills: 0 | DISCHARGE
Start: 2024-01-09

## 2024-01-09 RX ORDER — FLUTICASONE FUROATE, UMECLIDINIUM BROMIDE AND VILANTEROL TRIFENATATE 200; 62.5; 25 UG/1; UG/1; UG/1
2 POWDER RESPIRATORY (INHALATION)
Refills: 0 | DISCHARGE

## 2024-01-09 RX ORDER — HYDRALAZINE HCL 50 MG
1 TABLET ORAL
Qty: 0 | Refills: 0 | DISCHARGE
Start: 2024-01-09

## 2024-01-09 RX ADMIN — BUDESONIDE AND FORMOTEROL FUMARATE DIHYDRATE 1 PUFF(S): 160; 4.5 AEROSOL RESPIRATORY (INHALATION) at 07:36

## 2024-01-09 RX ADMIN — Medication 1 TABLET(S): at 11:40

## 2024-01-09 RX ADMIN — Medication 17 UNIT(S): at 11:40

## 2024-01-09 RX ADMIN — Medication 25 MILLIGRAM(S): at 06:07

## 2024-01-09 RX ADMIN — MAGNESIUM OXIDE 400 MG ORAL TABLET 400 MILLIGRAM(S): 241.3 TABLET ORAL at 07:36

## 2024-01-09 RX ADMIN — FLUCONAZOLE 100 MILLIGRAM(S): 150 TABLET ORAL at 11:41

## 2024-01-09 RX ADMIN — Medication 15 MILLIGRAM(S): at 07:38

## 2024-01-09 RX ADMIN — CHLORHEXIDINE GLUCONATE 1 APPLICATION(S): 213 SOLUTION TOPICAL at 05:41

## 2024-01-09 RX ADMIN — TACROLIMUS 6 MILLIGRAM(S): 5 CAPSULE ORAL at 07:37

## 2024-01-09 RX ADMIN — Medication 8: at 07:34

## 2024-01-09 RX ADMIN — Medication 60 MILLIGRAM(S): at 06:07

## 2024-01-09 RX ADMIN — MAGNESIUM OXIDE 400 MG ORAL TABLET 400 MILLIGRAM(S): 241.3 TABLET ORAL at 11:43

## 2024-01-09 RX ADMIN — Medication 25 MILLIGRAM(S): at 13:24

## 2024-01-09 RX ADMIN — VALGANCICLOVIR 450 MILLIGRAM(S): 450 TABLET, FILM COATED ORAL at 07:36

## 2024-01-09 RX ADMIN — MYCOPHENOLATE MOFETIL 1000 MILLIGRAM(S): 250 CAPSULE ORAL at 07:37

## 2024-01-09 RX ADMIN — Medication 6: at 11:39

## 2024-01-09 RX ADMIN — PANTOPRAZOLE SODIUM 40 MILLIGRAM(S): 20 TABLET, DELAYED RELEASE ORAL at 07:36

## 2024-01-09 NOTE — PROVIDER CONTACT NOTE (OTHER) - ASSESSMENT
Post transplant Quiz completed by patient. Patient answered all questions correctly. Quiz reviewed with patient. No questions or concerns.
Pt A&Ox4. PIVx1 w/ heparin running.
Pt AAOx4. VSS, except ST; denies CP, SOB. Daughter at bedside with pt eating dinner.
Pt Axo 4 neurologically intact hemodynamically stable on 0.5mcg/kg/min of Nipride and 1500u/hr of Heparin. afebrile no complaints of pain.  when introducing myself to patient RN said "Hi hi my name is kedar and im going to be your nurse tonight" pt then told me to "shut the fuck up we are republicans in this room" and I "must address him properly." Patient on the phone with unidentified caller while cursing me out telling me that I am "uses less and a piece of shit ".  When attempting to give nighttime medicine (hydralazine Lipitor, Lantus and Symbicort) Pt refusing to let me scan his ID band after asking, Pt Screaming and cursing telling me to "get the fuck out of my face". Screaming and Cursing at me stating "You are a condescending stupid fucking bitch get the fuck out of my room and don't come back." Patient began to smash his cellphone aggressively into the table multiple times, threw his crackers and belongings on the floor.
Pt educated on the risks of infection and poor hygiene. Offered new set of clothes and chlorhexidene wipes. Would only allow for lower extremities to be cleaned. Pt refused further hygienic management.
Pt has been receiving ongoing transplant education since hospitalization. Pt denies any pain or complaints at this time. Initially patient did not want to participate and stated he does not understand why he needs this education; explained to patient the importance of the education and he then agreed to participate. We discussed the following topics: immunosuppression medications, signs and symptoms of infection and rejection, lifestyle changes post - heart transplant, and checking vital signs and monitoring blood sugars (if necessary).    Quiz given to patient over the weekend, patient did not complete quiz, will re-assess tomorrow.
pt denies pain, afebrile, PIV patent benign from 11/29/23
Patient has no questions.
Teaching done regarding vital signs monitoring and potential transplant complications, signs and symptoms of infection. Patients and family were instructed on when to call the heart transplant phone.  We discussed the medications, clinic/biopsy appointments and nutrition.
n/a
Discussed surgical, medical, and psycho-social risks and benefits, selection process, UNOS waitlist information, and follow-up care.   We reviewed the LVAD pump, controller, batteries, battery clips and MPU. He put batteries into clips and attached it to the controller.
n/a
Teach back confirmed on some topics. patient provided with educational resources and is made aware we are available if he has any further questions.
Urine quality was clear, yellow, no residual, no cloudiness, no clots noted.
patient refusing to wear continuous pulse ox monitor because "its annoying i dont need to wear it" patient educated that he is in the ICU and needs a continuous pulse ox to monitor SpO2. DR. [place] aware and speaking to patient @ bedside.
Teaching done regarding: vital signs and blood sugar monitoring, medication administration, side effects, potential transplant complications, signs and symptoms of infection. Patient and family were instructed on when to call the transplant phone number that was provided.     Post Transplant Quiz completed by patient, Quiz reviewed, patient answered all answers correctly.     Patient aware of Blood work: 1/10 Next biopsy: 10/16 Endo F/U: tentatively 1/16

## 2024-01-09 NOTE — PROVIDER CONTACT NOTE (OTHER) - BACKGROUND
CHF, DM2, IABP support
Pill box done with pt. Pt has been provided w/ all filled medications, vital sign equipment, log sheets, discharge instructions, and follow-up schedule. Meds reviewed by transplant pharmacist.
Pt admitted with NSTEMI.
We discussed the following topics: immunosuppression medications, signs and symptoms of infection and rejection, lifestyle changes post - heart transplant.
pt admitted to [facility] s/p leaving [facility] with Stemi dx. 100% LAD occlusion, !00% RCA occlusion, s/p IABP and [place] removal at 11am 11/7/23.
Admitted for HF w/ R Fem IABP pending OHT
Pt currently enlisted on status 2 for transplant. Refusing to be washed
Reviewed evaluation process including testing, labs, appointments, and consults with the transplant team.
Reviewed evaluation process including testing, labs, appointments, and consults with the transplant team.
Patient in CICU with IABP pending OHT, status 2.
Patient pending OHT, listed status 2. IABP in place, R groin.
We discussed the following topics: immunosuppression medications, signs and symptoms of infection and rejection, lifestyle changes post - heart transplant.
heart transplant work up. L fem IABP. patient refusing dressing changes for IABB and a line, to be washed, to wear pulse ox and to take bowel regimen.

## 2024-01-09 NOTE — PROGRESS NOTE ADULT - NS ATTEND OPT1 GEN_ALL_CORE
I independently performed the documented:
I attest my time as attending is greater than 50% of the total combined time spent on qualifying patient care activities by the PA/NP and attending.
I independently performed the documented:
I attest my time as attending is greater than 50% of the total combined time spent on qualifying patient care activities by the PA/NP and attending.
I independently performed the documented:
I attest my time as attending is greater than 50% of the total combined time spent on qualifying patient care activities by the PA/NP and attending.
I independently performed the documented:
I independently performed the documented:
I attest my time as attending is greater than 50% of the total combined time spent on qualifying patient care activities by the PA/NP and attending.
I independently performed the documented:
I attest my time as attending is greater than 50% of the total combined time spent on qualifying patient care activities by the PA/NP and attending.

## 2024-01-09 NOTE — PROGRESS NOTE ADULT - NS ATTEND AMEND GEN_ALL_CORE FT
59 yo M with HFrEF (LVIDd 6.4 cm, LVEF 32%), CAD s/p PCI (2008), HTN, DMT2 (A1c 8.3%) and CVA s/p TPA (2018), initially presented to [facility] via EMS after syncope reportedly requiring defibrillation left AMA to come to [facility] where found to have Covid and intubated 11/1 for respiratory failure. Underwent L/RHC 11/1revealing  of LAD and RCA, elevated filling pressures and CI of 1.5 prompting placement of IABP which had been removed c/b PMVT cardiac arrest requiring replacement of IABP. Treated for Staph epi, Enterococcus faecalis, Cutibacterium with IV abx.  Was listed Status 2, ABO A, PRA 0% (12/12) and on 12/25, he underwent OHT (ischemic Time: 253min, CMV -/+, Toxo -/-). The following day, extubated and IABP removed. Overall progressing well. He underwent RHC/EMBx with revealed elevated filling pressures and normal cardiac output. Was diuresed well. Noted to have significant constipation and required disimpaction with improvement. Underwent RHC today which showed normal filling pressures and CI. On exam, JVP wnl, RRR, no m/r/g, CTAB, soft abdomen, no pedal edema. Labs reviewd - K 4.2, BUN/Cr 44/1.17 (improved; prior 1.12). BGs elevated (d/t reduced dose lantus last night).   - f/u biopsy; if normal, can d/c with reduced pred to 12.5 mg twice/day 59 yo M with HFrEF (LVIDd 6.4 cm, LVEF 32%), CAD s/p PCI (2008), HTN, DMT2 (A1c 8.3%) and CVA s/p TPA (2018), initially presented to [facility] via EMS after syncope reportedly requiring defibrillation left AMA to come to [facility] where found to have Covid and intubated 11/1 for respiratory failure. Underwent L/RHC 11/1revealing  of LAD and RCA, elevated filling pressures and CI of 1.5 prompting placement of IABP which had been removed c/b PMVT cardiac arrest requiring replacement of IABP. Treated for Staph epi, Enterococcus faecalis, Cutibacterium with IV abx.  Was listed Status 2, ABO A, PRA 0% (12/12) and on 12/25, he underwent OHT (ischemic Time: 253min, CMV -/+, Toxo -/-). The following day, extubated and IABP removed. Overall progressing well. He underwent RHC/EMBx with revealed elevated filling pressures and normal cardiac output. Was diuresed well. Noted to have significant constipation and required disimpaction with improvement. Underwent RHC today which showed normal filling pressures and CI. On exam, JVP wnl, RRR, no m/r/g, CTAB, soft abdomen, no pedal edema. Labs reviewd - K 4.2, BUN/Cr 44/1.17 (improved; prior 1.12). BGs elevated (d/t reduced dose lantus last night).   - f/u biopsy; if normal, can d/c with reduced pred to 12.5 mg twice/day

## 2024-01-09 NOTE — DISCHARGE NOTE NURSING/CASE MANAGEMENT/SOCIAL WORK - NSDCPEFALRISK_GEN_ALL_CORE
For information on Fall & Injury Prevention, visit: [email or web address] OR  [email or web address] OR  https://www.cdc.gov/steadi/patient.html For information on Fall & Injury Prevention, visit: [email or web address] OR  [email or web address] OR  https://www.cdc.gov/steadi/patient.html

## 2024-01-09 NOTE — PROGRESS NOTE ADULT - PROBLEM SELECTOR PLAN 2
- cardiorenal following  - Bumex on hold   - Trend NA, BUN/Cr on daily CMP - cardiorenal following  - Bumex on hold   - Trend NA, BUN/Cr on daily CMP

## 2024-01-09 NOTE — PROVIDER CONTACT NOTE (OTHER) - ACTION/TREATMENT ORDERED:
Will continue to educate.
PA Mesh put in an order for irrigation. Irrigation performed by MARU Gutierrez.
Patient is aware we are available should they or their family have any additional questions or concerns.
CHG wash
Provider aware, as per provider fix tele leads, contact if HR sustaining 130s. Pt left in no acute distress, speaking with daughter.
Will endorse to day team.
continue to provide education and monitor.
pt educated on appropriate language and asked calmly to tell me what's going on and how I can help. Call administration and covering manager, CICU providing team aware at bedside, cont with Q6 ativan,
Continue to provide education on importance of daily hygiene and CHG use.
aware, monitor
Will continue to educate.
Will continue to educate.
No barriers to discharge identified.
pt educated by heart failure team the importance to change soiled dressings to prevent infection

## 2024-01-09 NOTE — PROGRESS NOTE ADULT - PROBLEM SELECTOR PLAN 7
- post transplant course has been complicated by hyperglycemia  - Endo following  - will be discharged home on lantus 30 units and ISS  - if patient has elevated FS at home he has been instructed to call Endo office at [phone]  - follow up being arranged for Tuesday 1/16, timing to be determine. Email was sent to office to schedule - post transplant course has been complicated by hyperglycemia  - Endo following  - will be discharged home on lantus 30 units and ISS  - if patient has elevated FS at home he has been instructed to call Endo office at [phone]  - follow up being arranged for Tuesday 1/16, timing to be determine. Email was sent to office to schedule

## 2024-01-09 NOTE — DISCHARGE NOTE PROVIDER - DISCHARGE SERVICE FOR PATIENT
on the discharge service for the patient. I have reviewed and made amendments to the documentation where necessary.
with patient

## 2024-01-09 NOTE — PROGRESS NOTE ADULT - ASSESSMENT
57yo M w/ PMHx HTN, CAD s/p stent (2009), ICH (2008), ICM now s/p heart transplant on 12/25/2023. Endocrine consulted for T2DM management.  Tolerating POs with good oral intake, eats foods from home as well. pt was educated on consistent carbohydrate diet and advised to limit snacks between meals. s/p Methylprednisolone taper, now on Prednisone 15 mg BID with plan to taper to Pred 12.5mg BID per transplant     Steroid schedule  12/28 Methylprednisolone 32 units BID   12/29 Methylprednisolone 28 units BID  12/30 Methylprednisolone 24 units BID   12/31 Methylprednisolone 20 units BID   1/1 Methylprednisolone 16 units BID   1/2 Methylprednisolone 12 units BID   1/3 Prednisone 15 mg BID ( started on 1/3, PM)   Plan to taper to Prednisone 12.5mg BID per transplant team pending Bx results    #T2DM (A1c  8.3%), uncontrolled with complications   #Steroid induced hyperglycemia   Home meds: confirmed with patient on 12/28 patient takes dapagliflozin 10mg daily, patient is NOT taking ozempic any longer (has not taken in over 4 months)    Fasting BG tightly controlled prophylactially reduce lantus to prevent hypog lycemia  Steroid - currently on prednisone 15 mg BiD  -BG above goal today due to underdosing of lantus ( received 1/2 dose of lantus per primary team for NPO status while on steroids endocrine not contacted for dose adjustment recommendations) expect hyperglycemia today, FBG previously at goal  on lantus 33 units while tolerating meals. Per transplant patient for discharge today with plan to taper to prednisone 12.5mg BID pending Bx results , given that BG previously well controlled and steroids tapers reduce lantus dose, post prandial BG were at/borderline above goal continue current admelog doses with close outpatient follow up  Recommendations:   - Test BGs ACTID, at HS and 2 am   - Decrease Lantus to 30 units sq qhs  - C/w  premeal Admelog 20-17-15 ( Hold if NPO)   - Continue Moderate correction scale ACTID and sperate Moderate correction scale at HS and 2 am   - recommend Nutrition consult   - Please  keep hypoglycemia protocol in place   - Carb consistent diet   - please inform endocrine if any changes to steroid taper as this will impact insulin requirements   - BG goal 100 to 180mg/dl   - Please educate pt on insulin pen use and glucose check    - DISCHARGE RECOMMENDATIONS:   Discharge planning on basal and bolus regimen, dose TBD based on insulin requirements and steroid regimen  while on pred 12.5mg po BID: Please Send Rx for Basal insulin pen 30 units sq qhs ( Basaglar/ Lantus/ Tresiba), bolus insulin pen 20-17-15 ( Admelog/ Humalog/ Novolog) and pen needles with plan to further taper insulin doses once steroids are tapered or if BG <100  pt should monitor BGs ACTID and at HS and contact provider if BG< 70, > 400, or remain > 200   Please send Rx for Glucometer, lancets, strips and alcohol pads  Please arrange home care as he is new to insulin injections  - OUT-PATIENT FOLLOW UP: Patient should follow up with endocrinologist.   Can follow up with [facility] - [address] [place] [address], [address]. [place] [address]. Tel) [phone]   Awaiting appt detail confirmation    #HLD  - patient not on statin  - goal LDL in diabetes mellitus is <70  - outpatient fasting lipid profile     #HTN  - patient on losartan 25mg daily at home  - goal BP in diabetes mellitus is <130/80  - defer to primary team for management    discussed with patient and transplant team Judith BOSS and Deanna RIZVI NP  Contact via Microsoft Teams during business hours  To reach covering provider access AMION via sunrise tools  For Urgent matters/after-hours/weekends/holidays please page endocrine fellow on call   For nonurgent matters please email [email or web address]    Please note that this patient may be followed by different provider tomorrow.  Notify endocrine 24 hours prior to discharge for final recommendations 59yo M w/ PMHx HTN, CAD s/p stent (2009), ICH (2008), ICM now s/p heart transplant on 12/25/2023. Endocrine consulted for T2DM management.  Tolerating POs with good oral intake, eats foods from home as well. pt was educated on consistent carbohydrate diet and advised to limit snacks between meals. s/p Methylprednisolone taper, now on Prednisone 15 mg BID with plan to taper to Pred 12.5mg BID per transplant     Steroid schedule  12/28 Methylprednisolone 32 units BID   12/29 Methylprednisolone 28 units BID  12/30 Methylprednisolone 24 units BID   12/31 Methylprednisolone 20 units BID   1/1 Methylprednisolone 16 units BID   1/2 Methylprednisolone 12 units BID   1/3 Prednisone 15 mg BID ( started on 1/3, PM)   Plan to taper to Prednisone 12.5mg BID per transplant team pending Bx results    #T2DM (A1c  8.3%), uncontrolled with complications   #Steroid induced hyperglycemia   Home meds: confirmed with patient on 12/28 patient takes dapagliflozin 10mg daily, patient is NOT taking ozempic any longer (has not taken in over 4 months)    Fasting BG tightly controlled prophylactially reduce lantus to prevent hypog lycemia  Steroid - currently on prednisone 15 mg BiD  -BG above goal today due to underdosing of lantus ( received 1/2 dose of lantus per primary team for NPO status while on steroids endocrine not contacted for dose adjustment recommendations) expect hyperglycemia today, FBG previously at goal  on lantus 33 units while tolerating meals. Per transplant patient for discharge today with plan to taper to prednisone 12.5mg BID pending Bx results , given that BG previously well controlled and steroids tapers reduce lantus dose, post prandial BG were at/borderline above goal continue current admelog doses with close outpatient follow up  Recommendations:   - Test BGs ACTID, at HS and 2 am   - Decrease Lantus to 30 units sq qhs  - C/w  premeal Admelog 20-17-15 ( Hold if NPO)   - Continue Moderate correction scale ACTID and sperate Moderate correction scale at HS and 2 am   - recommend Nutrition consult   - Please  keep hypoglycemia protocol in place   - Carb consistent diet   - please inform endocrine if any changes to steroid taper as this will impact insulin requirements   - BG goal 100 to 180mg/dl   - Please educate pt on insulin pen use and glucose check    - DISCHARGE RECOMMENDATIONS:   Discharge planning on basal and bolus regimen, dose TBD based on insulin requirements and steroid regimen  while on pred 12.5mg po BID: Please Send Rx for Basal insulin pen 30 units sq qhs ( Basaglar/ Lantus/ Tresiba), bolus insulin pen 20-17-15 ( Admelog/ Humalog/ Novolog) and pen needles with plan to further taper insulin doses once steroids are tapered or if BG <100  pt should monitor BGs ACTID and at HS and contact provider if BG< 70, > 400, or remain > 200   Please send Rx for Glucometer, lancets, strips and alcohol pads  Please arrange home care as he is new to insulin injections  - OUT-PATIENT FOLLOW UP: Patient should follow up with endocrinologist.   Can follow up with [facility] - [address] [place] [address], [address]. [place] [address]. Tel) [phone]   Awaiting appt detail confirmation    #HLD  - patient not on statin  - goal LDL in diabetes mellitus is <70  - outpatient fasting lipid profile     #HTN  - patient on losartan 25mg daily at home  - goal BP in diabetes mellitus is <130/80  - defer to primary team for management    discussed with patient and transplant team Judith BOSS and Deanna RIZVI NP  Contact via Microsoft Teams during business hours  To reach covering provider access AMION via sunrise tools  For Urgent matters/after-hours/weekends/holidays please page endocrine fellow on call   For nonurgent matters please email [email or web address]    Please note that this patient may be followed by different provider tomorrow.  Notify endocrine 24 hours prior to discharge for final recommendations

## 2024-01-09 NOTE — DISCHARGE NOTE NURSING/CASE MANAGEMENT/SOCIAL WORK - PATIENT PORTAL LINK FT
You can access the FollowMyHealth Patient Portal offered by [facility] by registering at the following website: [email or web address]. By joining [employer]’s FollowMyHealth portal, you will also be able to view your health information using other applications (apps) compatible with our system. You can access the FollowMyHealth Patient Portal offered by [facility] by registering at the following website: [email or web address]. By joining [employer]’s FollowMyHealth portal, you will also be able to view your health information using other applications (apps) compatible with our system.

## 2024-01-09 NOTE — PROGRESS NOTE ADULT - ASSESSMENT
58M : HFrEF (LVIDd 6.4 cm, LVEF 32%), PCI ('08), HTN, DMT2 (A1c 8.3%) and CVA s/p TPA ('18), recently treated for PNA who initially presented to [facility] via EMS after syncope reportedly requiring defibrillation. Treated for ACS but left AMA to come to [facility]. On arrival ECG with ST depression in lateral leads. Intubated 11/1 for respiratory failure and was found to have COVID. L/RHC 11/1revealing  of LAD and RCA, elevated filling pressures and CI of 1.5 prompting placement of IABP. Extubated 11/3. IABP was weaned to off 11/7, however the following day developed PMVT cardiac arrest with prompt CPR and defibrillation, started on IV Lidocaine/Amio and IABP ultimately replaced on 11/9. During this admission was found to be bacteremic for which she was treated for Staph epi, Enterococcus faecalis, Cutibacterium with IV abx.  Was listed Status 2, ABO A, PRA 0% (12/12).     A suitable donor was identified and on 12/25, he underwent OHT (ischemic Time: 253min, CMV -/+, Toxo -/-). The following day, extubated and IABP removed. Milrinone was being gradually weaned but when turned off yesterday, despite adequate perfusion indicies, had rise in lactate for which inotropic support was resumed. Currently appears well supported and compensated. Will again trial wean off inotrope. Will also adjust oral antihypertensive regimen with goal to wean off Cardene gtt. Plan for 1st EMBX next week on 1/2.     On 12/25/23, pt underwent OHT  - IABP removed 12/26  - Sabrina and Epi weaned off 12/26  - Milrinone off  - Procardia XL 60 mg BID  - Bumex TID  - 1st RHC/EMBx on Tuesday 1/2  - Transfer to Stepdown 12/31  -  Right femerol rangel in place   - Med CT x 3 - LWS      insulin gtt d/t hyperglycemia - house endo following   - d/c planning - aniticipate home   1/1/24 - VSS - pt. c/o uncomfortable bed last evening - stating he would sign out AMA if he was not given a more comfortable bed.  staff obliged & a new bed was brought in.  pt requesting new urinal with each void as he "is at risk for an infection"  Ptt properly educated on post transplant risks.  states, "A doctor told me I can only use a urinal once to prevent infection"  transplant team to reinforce post-transplant precautions. MED CT x 3 in place  ?d/c femerol rangel today - will rounds with transplant team  1/2/24 VSS - NPO for cardiac bx -rhc this am-  pt refused blood draw this am despite provider discussing the importance of blood levels post transplant.  Pt acquiesced - allowed blood draw @ 7:30a-d/c planning -   1/3 s/p biopsy and rhc yesterday Ms ct x 1 remains in place.  Glucose improved, currentyly off insulin infusion  1/4/23   F/u cultures, rvp neg Glucose stable overnight MS ct remains in place  1/5  VSS  afebrile Na+ 133.  Wbc 16.  Ct c/a/p results pnd.  RVP neg.  BC-ngtd.  -200cc/24.  BS 99.  Tacro 8.2  1/6 VSS  afebrile  Na 132 Wbc 18.2.  UA ordered.   CT a/p/c mild stercoral colitis.  -1.7L  BC NG 48 hrs.  Tacro 10.6  1/7 VSS, WBC 14.1, afebrile, UA negative, , Bumex dc'd. Repeat BMP this afternoon. Plan for Golytely & enema today, pending BM. Check Abd Xray. Tacro level 12.2.  1/8     vss     one large BM  this am     tac level13      1/9 58M : HFrEF (LVIDd 6.4 cm, LVEF 32%), PCI ('08), HTN, DMT2 (A1c 8.3%) and CVA s/p TPA ('18), recently treated for PNA who initially presented to [facility] via EMS after syncope reportedly requiring defibrillation. Treated for ACS but left AMA to come to [facility]. On arrival ECG with ST depression in lateral leads. Intubated 11/1 for respiratory failure and was found to have COVID. L/RHC 11/1revealing  of LAD and RCA, elevated filling pressures and CI of 1.5 prompting placement of IABP. Extubated 11/3. IABP was weaned to off 11/7, however the following day developed PMVT cardiac arrest with prompt CPR and defibrillation, started on IV Lidocaine/Amio and IABP ultimately replaced on 11/9. During this admission was found to be bacteremic for which she was treated for Staph epi, Enterococcus faecalis, Cutibacterium with IV abx.  Was listed Status 2, ABO A, PRA 0% (12/12).     A suitable donor was identified and on 12/25, he underwent OHT (ischemic Time: 253min, CMV -/+, Toxo -/-). The following day, extubated and IABP removed. Milrinone was being gradually weaned but when turned off yesterday, despite adequate perfusion indicies, had rise in lactate for which inotropic support was resumed. Currently appears well supported and compensated. Will again trial wean off inotrope. Will also adjust oral antihypertensive regimen with goal to wean off Cardene gtt. Plan for 1st EMBX next week on 1/2.     On 12/25/23, pt underwent OHT  - IABP removed 12/26  - Sabrina and Epi weaned off 12/26  - Milrinone off  - Procardia XL 60 mg BID  - Bumex TID  - 1st RHC/EMBx on Tuesday 1/2  - Transfer to Stepdown 12/31  -  Right femerol rangel in place   - Med CT x 3 - LWS      insulin gtt d/t hyperglycemia - house endo following   - d/c planning - aniticipate home   1/1/24 - VSS - pt. c/o uncomfortable bed last evening - stating he would sign out AMA if he was not given a more comfortable bed.  staff obliged & a new bed was brought in.  pt requesting new urinal with each void as he "is at risk for an infection"  Ptt properly educated on post transplant risks.  states, "A doctor told me I can only use a urinal once to prevent infection"  transplant team to reinforce post-transplant precautions. MED CT x 3 in place  ?d/c femerol rangel today - will rounds with transplant team  1/2/24 VSS - NPO for cardiac bx -rhc this am-  pt refused blood draw this am despite provider discussing the importance of blood levels post transplant.  Pt acquiesced - allowed blood draw @ 7:30a-d/c planning -   1/3 s/p biopsy and rhc yesterday Ms ct x 1 remains in place.  Glucose improved, currentyly off insulin infusion  1/4/23   F/u cultures, rvp neg Glucose stable overnight MS ct remains in place  1/5  VSS  afebrile Na+ 133.  Wbc 16.  Ct c/a/p results pnd.  RVP neg.  BC-ngtd.  -200cc/24.  BS 99.  Tacro 8.2  1/6 VSS  afebrile  Na 132 Wbc 18.2.  UA ordered.   CT a/p/c mild stercoral colitis.  -1.7L  BC NG 48 hrs.  Tacro 10.6  1/7 VSS, WBC 14.1, afebrile, UA negative, , Bumex dc'd. Repeat BMP this afternoon. Plan for Golytely & enema today, pending BM. Check Abd Xray. Tacro level 12.2.  1/8     vss     one large BM  this am     tac level13      1/9 58M : HFrEF (LVIDd 6.4 cm, LVEF 32%), PCI ('08), HTN, DMT2 (A1c 8.3%) and CVA s/p TPA ('18), recently treated for PNA who initially presented to [facility] via EMS after syncope reportedly requiring defibrillation. Treated for ACS but left AMA to come to [facility]. On arrival ECG with ST depression in lateral leads. Intubated 11/1 for respiratory failure and was found to have COVID. L/RHC 11/1revealing  of LAD and RCA, elevated filling pressures and CI of 1.5 prompting placement of IABP. Extubated 11/3. IABP was weaned to off 11/7, however the following day developed PMVT cardiac arrest with prompt CPR and defibrillation, started on IV Lidocaine/Amio and IABP ultimately replaced on 11/9. During this admission was found to be bacteremic for which she was treated for Staph epi, Enterococcus faecalis, Cutibacterium with IV abx.  Was listed Status 2, ABO A, PRA 0% (12/12).     A suitable donor was identified and on 12/25, he underwent OHT (ischemic Time: 253min, CMV -/+, Toxo -/-). The following day, extubated and IABP removed. Milrinone was being gradually weaned but when turned off yesterday, despite adequate perfusion indicies, had rise in lactate for which inotropic support was resumed. Currently appears well supported and compensated. Will again trial wean off inotrope. Will also adjust oral antihypertensive regimen with goal to wean off Cardene gtt. Plan for 1st EMBX next week on 1/2.     On 12/25/23, pt underwent OHT  - IABP removed 12/26  - Sabrina and Epi weaned off 12/26  - Milrinone off  - Procardia XL 60 mg BID  - Bumex TID  - 1st RHC/EMBx on Tuesday 1/2  - Transfer to Stepdown 12/31  -  Right femerol rangel in place   - Med CT x 3 - LWS      insulin gtt d/t hyperglycemia - house endo following   - d/c planning - aniticipate home   1/1/24 - VSS - pt. c/o uncomfortable bed last evening - stating he would sign out AMA if he was not given a more comfortable bed.  staff obliged & a new bed was brought in.  pt requesting new urinal with each void as he "is at risk for an infection"  Ptt properly educated on post transplant risks.  states, "A doctor told me I can only use a urinal once to prevent infection"  transplant team to reinforce post-transplant precautions. MED CT x 3 in place  ?d/c femerol rangel today - will rounds with transplant team  1/2/24 VSS - NPO for cardiac bx -rhc this am-  pt refused blood draw this am despite provider discussing the importance of blood levels post transplant.  Pt acquiesced - allowed blood draw @ 7:30a-d/c planning -   1/3 s/p biopsy and rhc yesterday Ms ct x 1 remains in place.  Glucose improved, currentyly off insulin infusion  1/4/23   F/u cultures, rvp neg Glucose stable overnight MS ct remains in place  1/5  VSS  afebrile Na+ 133.  Wbc 16.  Ct c/a/p results pnd.  RVP neg.  BC-ngtd.  -200cc/24.  BS 99.  Tacro 8.2  1/6 VSS  afebrile  Na 132 Wbc 18.2.  UA ordered.   CT a/p/c mild stercoral colitis.  -1.7L  BC NG 48 hrs.  Tacro 10.6  1/7 VSS, WBC 14.1, afebrile, UA negative, , Bumex dc'd. Repeat BMP this afternoon. Plan for Golytely & enema today, pending BM. Check Abd Xray. Tacro level 12.2.  1/8     vss     one large BM  this am     tac level13      1/9   vss   sp cardiac bx     NA  132 58M : HFrEF (LVIDd 6.4 cm, LVEF 32%), PCI ('08), HTN, DMT2 (A1c 8.3%) and CVA s/p TPA ('18), recently treated for PNA who initially presented to [facility] via EMS after syncope reportedly requiring defibrillation. Treated for ACS but left AMA to come to [facility]. On arrival ECG with ST depression in lateral leads. Intubated 11/1 for respiratory failure and was found to have COVID. L/RHC 11/1revealing  of LAD and RCA, elevated filling pressures and CI of 1.5 prompting placement of IABP. Extubated 11/3. IABP was weaned to off 11/7, however the following day developed PMVT cardiac arrest with prompt CPR and defibrillation, started on IV Lidocaine/Amio and IABP ultimately replaced on 11/9. During this admission was found to be bacteremic for which she was treated for Staph epi, Enterococcus faecalis, Cutibacterium with IV abx.  Was listed Status 2, ABO A, PRA 0% (12/12).     A suitable donor was identified and on 12/25, he underwent OHT (ischemic Time: 253min, CMV -/+, Toxo -/-). The following day, extubated and IABP removed. Milrinone was being gradually weaned but when turned off yesterday, despite adequate perfusion indicies, had rise in lactate for which inotropic support was resumed. Currently appears well supported and compensated. Will again trial wean off inotrope. Will also adjust oral antihypertensive regimen with goal to wean off Cardene gtt. Plan for 1st EMBX next week on 1/2.     On 12/25/23, pt underwent OHT  - IABP removed 12/26  - Sabrina and Epi weaned off 12/26  - Milrinone off  - Procardia XL 60 mg BID  - Bumex TID  - 1st RHC/EMBx on Tuesday 1/2  - Transfer to Stepdown 12/31  -  Right femerol rangel in place   - Med CT x 3 - LWS      insulin gtt d/t hyperglycemia - house endo following   - d/c planning - aniticipate home   1/1/24 - VSS - pt. c/o uncomfortable bed last evening - stating he would sign out AMA if he was not given a more comfortable bed.  staff obliged & a new bed was brought in.  pt requesting new urinal with each void as he "is at risk for an infection"  Ptt properly educated on post transplant risks.  states, "A doctor told me I can only use a urinal once to prevent infection"  transplant team to reinforce post-transplant precautions. MED CT x 3 in place  ?d/c femerol rangel today - will rounds with transplant team  1/2/24 VSS - NPO for cardiac bx -rhc this am-  pt refused blood draw this am despite provider discussing the importance of blood levels post transplant.  Pt acquiesced - allowed blood draw @ 7:30a-d/c planning -   1/3 s/p biopsy and rhc yesterday Ms ct x 1 remains in place.  Glucose improved, currentyly off insulin infusion  1/4/23   F/u cultures, rvp neg Glucose stable overnight MS ct remains in place  1/5  VSS  afebrile Na+ 133.  Wbc 16.  Ct c/a/p results pnd.  RVP neg.  BC-ngtd.  -200cc/24.  BS 99.  Tacro 8.2  1/6 VSS  afebrile  Na 132 Wbc 18.2.  UA ordered.   CT a/p/c mild stercoral colitis.  -1.7L  BC NG 48 hrs.  Tacro 10.6  1/7 VSS, WBC 14.1, afebrile, UA negative, , Bumex dc'd. Repeat BMP this afternoon. Plan for Golytely & enema today, pending BM. Check Abd Xray. Tacro level 12.2.  1/8     vss     one large BM  this am     tac level13      1/9   vss   sp cardiac bx     NA  132

## 2024-01-09 NOTE — PROVIDER CONTACT NOTE (OTHER) - RECOMMENDATIONS
Patient is aware we are available should they or their family have any additional questions or concerns.
Please endorse to day team, attending and heart failure team that patient is refusing AM care regularly.
Offered patient the option to take out holt to see if he wants to try voiding but patient declined.
Education provided on the importance of strict hygiene practices while in the ICU with central line access. Patient adamantly refusing despite verbalizing risks.
Pt educated on importance of strict hygiene practice, along with having at least 2 IV accesses in case of emergency.
Pt to be CHG washed
Patient verbalized understanding, return demonstrated correctly, and was able to provide teach back.
recommend for patient to be spoken to by heart failure team.
informed pt about risk of infection with no iv change and no am care, diabetes teaching given and pt noncompliant eating bagel and regular gingerale from home
Notify Provider.
pt educated on appropriate language and asked calmly to tell me what's going on and how I can help. Call administration and covering manager, CICU providing team aware at bedside, cont with Q6 ativan.
The patient needs continuous reinforcement of post-transplant care teaching. Encouraged to read and review the Patient Guide left at bedside.
The patient needs continuous reinforcement of post-transplant care teaching. Encouraged to read and review the Patient Guide left at bedside.

## 2024-01-09 NOTE — PROGRESS NOTE ADULT - PROBLEM SELECTOR PROBLEM 1
ARIC (acute kidney injury)
Cardiogenic shock
Heart transplant recipient
Heart transplant recipient
S/P orthotopic heart transplant
Type 2 diabetes mellitus with hyperglycemia
ARIC (acute kidney injury)
Cardiogenic shock
ARIC (acute kidney injury)
Cardiogenic shock
Cardiogenic shock
S/P orthotopic heart transplant
ARIC (acute kidney injury)
ARIC (acute kidney injury)
Cardiogenic shock
ARIC (acute kidney injury)
ARIC (acute kidney injury)
Cardiogenic shock
Type 2 diabetes mellitus with hyperglycemia
Cardiogenic shock
S/P orthotopic heart transplant
ARIC (acute kidney injury)
Cardiogenic shock
Type 2 diabetes mellitus with hyperglycemia
Type 2 diabetes mellitus with hyperglycemia
Cardiogenic shock
Heart transplant recipient
S/P orthotopic heart transplant
Type 2 diabetes mellitus with hyperglycemia
Cardiogenic shock
Cardiogenic shock
ARIC (acute kidney injury)
Cardiogenic shock
Heart transplant recipient
Cardiogenic shock
Heart transplant recipient
Heart transplant recipient
Type 2 diabetes mellitus with hyperglycemia
Type 2 diabetes mellitus with hyperglycemia
Cardiogenic shock
Heart transplant recipient
Cardiogenic shock
Cardiogenic shock
S/P orthotopic heart transplant
Type 2 diabetes mellitus with hyperglycemia
Type 2 diabetes mellitus with hyperglycemia
S/P orthotopic heart transplant
S/P orthotopic heart transplant
Cardiogenic shock
Cardiogenic shock
Heart transplant recipient
Heart transplant recipient
Cardiogenic shock
Heart transplant recipient
Cardiogenic shock
Heart transplant recipient
S/P orthotopic heart transplant
Cardiogenic shock
S/P orthotopic heart transplant
Cardiogenic shock
S/P orthotopic heart transplant
Heart transplant recipient
Cardiogenic shock

## 2024-01-09 NOTE — PROGRESS NOTE ADULT - PROBLEM SELECTOR PLAN 1
- s/p OHT on 12/25. Ischemic Time: 253min  - IABP removed 12/26  - continue with Procardia XL 60 mg BID and reduce hydralazine 25 mg PO TID  - currently off standing diuretics   - underwent RHC/EMBx on 1/2, Grade 0R  - next RHC/EMBx schedule for tomorrow 1/9. Will need to be NPO and subq heparin held  - Please keep primary Dr. Banuelos [phone] in the loop per family request. - s/p OHT on 12/25. Ischemic Time: 253min  - IABP removed 12/26  - continue with Procardia XL 60 mg BID and reduce hydralazine 25 mg PO TID  - currently off standing diuretics   - underwent RHC/EMBx on 1/2, Grade 0R  - next RHC/EMBx schedule for tomorrow 1/9. Will need to be NPO and subq heparin held  - Please keep primary Dr. Banuelos [phone] in the loop per family request. - s/p OHT on 12/25. Ischemic Time: 253min  - IABP removed 12/26  - continue with Procardia XL 60 mg BID and hydralazine 25 mg PO TID  - currently off standing diuretics   - underwent RHC/EMBx on 1/2, Grade 0R  - awaiting results from RHC/EMBx today. Next RHC/EMBx schedule for Tuesday 1/16. Plan to remove Right groin suture during this visit. Please call Dr. Zamora when patient has arrived as he will be the one to take out suture  - Please keep primary Dr. Banuelos [phone] in the loop per family request. - s/p OHT on 12/25. Ischemic Time: 253min  - IABP removed 12/26  - continue with Procardia XL 60 mg BID and hydralazine 25 mg PO TID  - currently off standing diuretics   - underwent RHC/EMBx on 1/2, Grade 0R  - awaiting results from RHC/EMBx today. Next RHC/EMBx schedule for Tuesday 1/16. Plan to remove Right groin suture during this visit. Please call Dr. Zamora when patient has arrived as he will be the one to take out suture  - Please keep primary Dr. Banuelos [phone] in the loop per family request.

## 2024-01-09 NOTE — PROGRESS NOTE ADULT - PROBLEM SELECTOR PLAN 4
- pre transplant had baseline Cr 1.2, peaked to 4  - cardiorenal following  - post transplant was noted to have bump in renal function, remains slightly above baseline but stable   - diuretics as stated above   - monitor closely.

## 2024-01-09 NOTE — PHARMACOTHERAPY INTERVENTION NOTE - COMMENTS
Patient name: Ravi Hardy  : 1965  Heart Transplant: 2023  MRN: [id number]  Diagnosis: Heart Transplant    --------------------------------------------------  Take this medicine as scheduled  --------------------------------------------------  Prograf (tacrolimus) 1 mg Oral Capsule.  Take 1 capsule (1 mg) by mouth 2 times every day at 9:00 am and 9:00 pm.  1 capsule (1 mg) at 9:00 am  1 capsule (1 mg) at 9:00 pm  Prograf (tacrolimus) 5 mg Oral Capsule.  Take 1 capsule (5 mg) by mouth 2 times every day at 9:00 am and 9:00 pm.  1 capsule (5 mg) at 9:00 am  1 capsule (5 mg) at 9:00 pm  CellCept (mycophenolate mofetil) 250 mg Oral Capsule.  Take 4 capsules (1,000 mg) by mouth 2 times every day at 9:00 am and 9:00 pm.  4 capsules (1,000 mg) at 9:00 am  4 capsules (1,000 mg) at 9:00 pm  PredniSONE 5 mg Oral Tablet.  Take 1/2 tablet (2.5 mg) by mouth 2 times every day at 9:00 am and 9:00 pm.  1 tablet (2.5 mg) at 9:00 am  1 tablet (2.5 mg) at 9:00 pm  PredniSONE 10 mg Oral Tablet.  Take 1 tablet (10 mg) by mouth 2 times every day at 9:00 am and 9:00 pm.  1 tablet (10 mg) at 9:00 am  1 tablet (10 mg) at 9:00 pm  Diflucan (fluconazole) 100 mg Oral Tablet.  Take 1 tablet (100 mg) by mouth every day at 9:00 am.  1 tablet (100 mg) at 9:00 am  Bactrim (sulfamethoxazole/?trimethoprim) 400 mg/80 mg Oral Tablet.  Take 1 tablet by mouth every day at 9:00 am.  1 tablet at 9:00 am  Valcyte (valGANciclovir hydrochloride) 450 mg Oral Tablet.  Take 1 tablet (450 mg) by mouth 2 times every day at 9:00 am and 9:00 pm.  1 tablet (450 mg) at 9:00 am  1 tablet (450 mg) at 9:00 pm  Symbicort (budesonide/?formoterol) 160-4.5 mcg Inhaler.  Inhale 1 puff through the mouth 2 times every day at 9:00 am and 9:00 pm.  1 puff at 9:00 am  1 puff at 9:00 pm  Procardia XL (NIFEdipine) 60 mg Extended Release Tablet.  Take 1 tablet (60 mg) by mouth 2 times every day at 9:00 am and 9:00 pm.  1 tablet (60 mg) at 9:00 am  1 tablet (60 mg) at 9:00 pm  HydrALAZINE hydrochloride 25 mg Oral Tablet.  Take 1 tablet (25 mg) by mouth 3 times every day at 9:00 am, 3:00 pm, and 9:00 pm.  1 tablet (25 mg) at 9:00 am  1 tablet (25 mg) at 3:00 pm  1 tablet (25 mg) at 9:00 pm  Protonix (pantoprazole sodium) 40 mg Delayed Release Tablet.  Take 1 tablet (40 mg) by mouth every day at 9:00 am.  1 tablet (40 mg) at 9:00 am  Zyprexa (OLANZapine) 5 mg Oral Tablet.  Take 1 tablet (5 mg) by mouth every day at 9:00 pm.  1 tablet (5 mg) at 9:00 pm  Dialyvite (multivitamin) Oral Tablet.  Take 1 tablet by mouth every day at 9:00 am.  1 tablet at 9:00 am  Mag-Ox 400 (magnesium oxide) 400 mg Oral Tablet.  Take 1 tablet (400 mg) by mouth 3 times every day at 9:00 am, 3:00 pm, and 9:00 pm.  1 tablet (400 mg) at 9:00 am  1 tablet (400 mg) at 3:00 pm  1 tablet (400 mg) at 9:00 pm  Citracal + D (calcium citrate, Vitamin D) 315 mg/200 International Units Oral Tablet.  Take 2 tablets by mouth 2 times every day at 9:00 am and 9:00 pm.  2 tablets at 9:00 am  2 tablets at 9:00 pm  --------------------------------------------------  Take this medicine as needed  --------------------------------------------------  Lokelma (sodium zirconium cyclosilicate) 10 G Oral Suspension.  Take 1 packet (10 grams) by mouth every day for For elevated potassium ----- will let you know when the dose needs to be taken. As needed medicine.  --------------------------------------------------  Take these Insulin Medications as directed  --------------------------------------------------  Humalog KwikPen (Insulin Lispro) 100 units/mL Pen Injector.  Inject under the skin  Take this insulin with meals:  20 units before breakfast  17 units before lunch  15 units before dinner    Lantus Solostar 30 units at bedtime      Patient name: Ravi Hardy  : 1965  Heart Transplant: 2023  MRN: [id number]  Diagnosis: Heart Transplant    --------------------------------------------------  Take this medicine as scheduled  --------------------------------------------------  Prograf (tacrolimus) 1 mg Oral Capsule.  Take 1 capsule (1 mg) by mouth 2 times every day at 9:00 am and 9:00 pm.  1 capsule (1 mg) at 9:00 am  1 capsule (1 mg) at 9:00 pm  Prograf (tacrolimus) 5 mg Oral Capsule.  Take 1 capsule (5 mg) by mouth 2 times every day at 9:00 am and 9:00 pm.  1 capsule (5 mg) at 9:00 am  1 capsule (5 mg) at 9:00 pm  CellCept (mycophenolate mofetil) 250 mg Oral Capsule.  Take 4 capsules (1,000 mg) by mouth 2 times every day at 9:00 am and 9:00 pm.  4 capsules (1,000 mg) at 9:00 am  4 capsules (1,000 mg) at 9:00 pm  PredniSONE 5 mg Oral Tablet.  Take 1/2 tablet (2.5 mg) by mouth 2 times every day at 9:00 am and 9:00 pm.  1 tablet (2.5 mg) at 9:00 am  1 tablet (2.5 mg) at 9:00 pm  PredniSONE 10 mg Oral Tablet.  Take 1 tablet (10 mg) by mouth 2 times every day at 9:00 am and 9:00 pm.  1 tablet (10 mg) at 9:00 am  1 tablet (10 mg) at 9:00 pm  Diflucan (fluconazole) 100 mg Oral Tablet.  Take 1 tablet (100 mg) by mouth every day at 9:00 am.  1 tablet (100 mg) at 9:00 am  Bactrim (sulfamethoxazole/?trimethoprim) 400 mg/80 mg Oral Tablet.  Take 1 tablet by mouth every day at 9:00 am.  1 tablet at 9:00 am  Valcyte (valGANciclovir hydrochloride) 450 mg Oral Tablet.  Take 1 tablet (450 mg) by mouth 2 times every day at 9:00 am and 9:00 pm.  1 tablet (450 mg) at 9:00 am  1 tablet (450 mg) at 9:00 pm  Symbicort (budesonide/?formoterol) 160-4.5 mcg Inhaler.  Inhale 1 puff through the mouth 2 times every day at 9:00 am and 9:00 pm.  1 puff at 9:00 am  1 puff at 9:00 pm  Procardia XL (NIFEdipine) 60 mg Extended Release Tablet.  Take 1 tablet (60 mg) by mouth 2 times every day at 9:00 am and 9:00 pm.  1 tablet (60 mg) at 9:00 am  1 tablet (60 mg) at 9:00 pm  HydrALAZINE hydrochloride 25 mg Oral Tablet.  Take 1 tablet (25 mg) by mouth 3 times every day at 9:00 am, 3:00 pm, and 9:00 pm.  1 tablet (25 mg) at 9:00 am  1 tablet (25 mg) at 3:00 pm  1 tablet (25 mg) at 9:00 pm  Protonix (pantoprazole sodium) 40 mg Delayed Release Tablet.  Take 1 tablet (40 mg) by mouth every day at 9:00 am.  1 tablet (40 mg) at 9:00 am  Zyprexa (OLANZapine) 5 mg Oral Tablet.  Take 1 tablet (5 mg) by mouth every day at 9:00 pm.  1 tablet (5 mg) at 9:00 pm  Dialyvite (multivitamin) Oral Tablet.  Take 1 tablet by mouth every day at 9:00 am.  1 tablet at 9:00 am  Mag-Ox 400 (magnesium oxide) 400 mg Oral Tablet.  Take 1 tablet (400 mg) by mouth 3 times every day at 9:00 am, 3:00 pm, and 9:00 pm.  1 tablet (400 mg) at 9:00 am  1 tablet (400 mg) at 3:00 pm  1 tablet (400 mg) at 9:00 pm  Citracal + D (calcium citrate, Vitamin D) 315 mg/200 International Units Oral Tablet.  Take 2 tablets by mouth 2 times every day at 9:00 am and 9:00 pm.  2 tablets at 9:00 am  2 tablets at 9:00 pm  --------------------------------------------------  Take this medicine as needed  --------------------------------------------------  Lokelma (sodium zirconium cyclosilicate) 10 G Oral Suspension.  Take 1 packet (10 grams) by mouth every day for For elevated potassium ----- will let you know when the dose needs to be taken. As needed medicine.  --------------------------------------------------  Take these Insulin Medications as directed  --------------------------------------------------  Humalog KwikPen (Insulin Lispro) 100 units/mL Pen Injector.  Inject under the skin  Take this insulin with meals:  20 units before breakfast  17 units before lunch  15 units before dinner    Lantus Solostar 30 units at bedtime

## 2024-01-09 NOTE — PROGRESS NOTE ADULT - PROBLEM SELECTOR PLAN 6
- Pt noting significant constipation and CT showing stercoral colitis 1/5  - S/p MgCO3  - GI consulted   - Cont lactulose 20 QID +/- golytely   - Can also use bisacodyl suppository if pt amenable   - Encourage ambulation - Pt noting significant constipation and CT showing stercoral colitis 1/5  - S/p MgCO3  - GI consulted and now signed off   - Cont lactulose 20 QID +/- golytely   - Can also use bisacodyl suppository if pt amenable   - Encourage ambulation

## 2024-01-09 NOTE — PROGRESS NOTE ADULT - SUBJECTIVE AND OBJECTIVE BOX
seen earlier today     Chief Complaint: Diabetes Mellitus follow up    INTERVAL HX: patient received half dose of lantus per primary team with rebound hyperglycemia to 304 this AM, s/p biopsy this am ,   patient reports he only eats one meal a day at home education on proper nutrition (3 balanced meal a day) provided, patient reports he will adhere to nutrition plan provided by RD     Review of Systems:  General: As above  GI: No nausea, vomiting  Endocrine: no  S&Sx of hypoglycemia    Allergies    penicillins (Unknown)    Intolerances      MEDICATIONS  (STANDING):  budesonide  80 MICROgram(s)/formoterol 4.5 MICROgram(s) Inhaler 1 Puff(s) Inhalation two times a day  chlorhexidine 4% Liquid 1 Application(s) Topical <User Schedule>  dextrose 5%. 1000 milliLiter(s) (50 mL/Hr) IV Continuous <Continuous>  dextrose 5%. 1000 milliLiter(s) (100 mL/Hr) IV Continuous <Continuous>  dextrose 50% Injectable 50 milliLiter(s) IV Push every 15 minutes  fluconAZOLE   Tablet 100 milliGRAM(s) Oral <User Schedule>  glucagon  Injectable 1 milliGRAM(s) IntraMuscular once  hydrALAZINE 25 milliGRAM(s) Oral every 8 hours  insulin glargine Injectable (LANTUS) 33 Unit(s) SubCutaneous at bedtime  insulin lispro (ADMELOG) corrective regimen sliding scale   SubCutaneous three times a day before meals  insulin lispro (ADMELOG) corrective regimen sliding scale   SubCutaneous <User Schedule>  insulin lispro Injectable (ADMELOG) 20 Unit(s) SubCutaneous before breakfast  insulin lispro Injectable (ADMELOG) 17 Unit(s) SubCutaneous before dinner  insulin lispro Injectable (ADMELOG) 17 Unit(s) SubCutaneous before lunch  insulin regular Infusion 3 Unit(s)/Hr (3 mL/Hr) IV Continuous <Continuous>  lidocaine   4% Patch 3 Patch Transdermal daily  magnesium oxide 400 milliGRAM(s) Oral three times a day with meals  melatonin 5 milliGRAM(s) Oral at bedtime  mycophenolate mofetil 1000 milliGRAM(s) Oral every 12 hours  NIFEdipine XL 60 milliGRAM(s) Oral every 12 hours  OLANZapine 5 milliGRAM(s) Oral at bedtime  pantoprazole    Tablet 40 milliGRAM(s) Oral every 24 hours  polyethylene glycol 3350 17 Gram(s) Oral daily  predniSONE   Tablet 15 milliGRAM(s) Oral every 12 hours  senna 2 Tablet(s) Oral at bedtime  tacrolimus 6 milliGRAM(s) Oral <User Schedule>  trimethoprim   80 mG/sulfamethoxazole 400 mG 1 Tablet(s) Oral daily  valGANciclovir 450 milliGRAM(s) Oral every 12 hours        insulin glargine Injectable (LANTUS)   33 Unit(s) SubCutaneous (01-08-24 @ 22:22)    insulin lispro (ADMELOG) corrective regimen sliding scale   6 Unit(s) SubCutaneous (01-09-24 @ 11:39)   8 Unit(s) SubCutaneous (01-09-24 @ 07:34)   2 Unit(s) SubCutaneous (01-08-24 @ 17:12)   4 Unit(s) SubCutaneous (01-08-24 @ 13:12)    insulin lispro Injectable (ADMELOG)   17 Unit(s) SubCutaneous (01-08-24 @ 17:18)    insulin lispro Injectable (ADMELOG)   17 Unit(s) SubCutaneous (01-09-24 @ 11:40)   17 Unit(s) SubCutaneous (01-08-24 @ 13:09)    predniSONE   Tablet   15 milliGRAM(s) Oral (01-09-24 @ 07:38)   15 milliGRAM(s) Oral (01-08-24 @ 20:13)        PHYSICAL EXAM:  VITALS: T(C): 36.4 (01-09-24 @ 11:36)  T(F): 97.5 (01-09-24 @ 11:36), Max: 98.2 (01-09-24 @ 06:10)  HR: 95 (01-09-24 @ 11:36) (88 - 98)  BP: 127/75 (01-09-24 @ 11:36) (95/60 - 131/70)  RR:  (16 - 20)  SpO2:  (94% - 97%)  Wt(kg): --  GENERAL: NAD MSI RUBA  Respiratory: Respirations unlabored   Extremities: Warm, no edema  NEURO: Alert , appropriate     LABS:  POCT Blood Glucose.: 255 mg/dL (01-09-24 @ 11:22)  POCT Blood Glucose.: 305 mg/dL (01-09-24 @ 07:12)  POCT Blood Glucose.: 304 mg/dL (01-09-24 @ 07:10)  POCT Blood Glucose.: 232 mg/dL (01-09-24 @ 02:08)  POCT Blood Glucose.: 176 mg/dL (01-08-24 @ 22:20)  POCT Blood Glucose.: 171 mg/dL (01-08-24 @ 17:04)  POCT Blood Glucose.: 212 mg/dL (01-08-24 @ 13:08)  POCT Blood Glucose.: 141 mg/dL (01-08-24 @ 07:35)  POCT Blood Glucose.: 116 mg/dL (01-08-24 @ 02:32)  POCT Blood Glucose.: 154 mg/dL (01-07-24 @ 23:15)  POCT Blood Glucose.: 83 mg/dL (01-07-24 @ 21:50)  POCT Blood Glucose.: 94 mg/dL (01-07-24 @ 11:32)  POCT Blood Glucose.: 178 mg/dL (01-07-24 @ 07:34)  POCT Blood Glucose.: 281 mg/dL (01-07-24 @ 02:19)  POCT Blood Glucose.: 278 mg/dL (01-06-24 @ 21:35)  POCT Blood Glucose.: 136 mg/dL (01-06-24 @ 19:14)  POCT Blood Glucose.: 181 mg/dL (01-06-24 @ 12:54)  POCT Blood Glucose.: 194 mg/dL (01-06-24 @ 12:51)                          9.8    7.99  )-----------( 200      ( 09 Jan 2024 08:39 )             29.9     01-09    132<L>  |  94<L>  |  44<H>  ----------------------------<  313<H>  4.6   |  23  |  1.17    Ca    9.5      09 Jan 2024 08:39  Phos  2.1     01-09  Mg     2.1     01-09    TPro  5.9<L>  /  Alb  3.8  /  TBili  0.2  /  DBili  x   /  AST  42<H>  /  ALT  68<H>  /  AlkPhos  58  01-09    eGFR: 72 mL/min/1.73m2 (09 Jan 2024 08:39)    11-10 Chol 130 Direct LDL -- LDL calculated 75 HDL 37<L> Trig 95, 11-03 Chol 198 Direct LDL -- LDL calculated 114<H> HDL 24<L> Trig 344<H>  Thyroid Function Tests:      A1C with Estimated Average Glucose Result: 6.1 % (01-02-24 @ 07:26)  A1C with Estimated Average Glucose Result: 8.3 % (11-01-23 @ 06:14)    Estimated Average Glucose: 128 mg/dL (01-02-24 @ 07:26)  Estimated Average Glucose: 192 mg/dL (11-01-23 @ 06:14)        Diet, NPO after Midnight:      NPO Start Date: 08-Jan-2024,   NPO Start Time: 23:59 (01-08-24 @ 14:54) [Active]  Diet, Consistent Carbohydrate/No Snacks:   1000mL Fluid Restriction ([id number]) (01-04-24 @ 13:57) [Active]              seen earlier today     Chief Complaint: Diabetes Mellitus follow up    INTERVAL HX: patient received half dose of lantus per primary team with rebound hyperglycemia to 304 this AM, s/p biopsy this am ,   patient reports he only eats one meal a day at home education on proper nutrition (3 balanced meal a day) provided, patient reports he will adhere to nutrition plan provided by RD     Review of Systems:  General: As above  GI: No nausea, vomiting  Endocrine: no  S&Sx of hypoglycemia    Allergies    penicillins (Unknown)    Intolerances      MEDICATIONS  (STANDING):  budesonide  80 MICROgram(s)/formoterol 4.5 MICROgram(s) Inhaler 1 Puff(s) Inhalation two times a day  chlorhexidine 4% Liquid 1 Application(s) Topical <User Schedule>  dextrose 5%. 1000 milliLiter(s) (50 mL/Hr) IV Continuous <Continuous>  dextrose 5%. 1000 milliLiter(s) (100 mL/Hr) IV Continuous <Continuous>  dextrose 50% Injectable 50 milliLiter(s) IV Push every 15 minutes  fluconAZOLE   Tablet 100 milliGRAM(s) Oral <User Schedule>  glucagon  Injectable 1 milliGRAM(s) IntraMuscular once  hydrALAZINE 25 milliGRAM(s) Oral every 8 hours  insulin glargine Injectable (LANTUS) 33 Unit(s) SubCutaneous at bedtime  insulin lispro (ADMELOG) corrective regimen sliding scale   SubCutaneous three times a day before meals  insulin lispro (ADMELOG) corrective regimen sliding scale   SubCutaneous <User Schedule>  insulin lispro Injectable (ADMELOG) 20 Unit(s) SubCutaneous before breakfast  insulin lispro Injectable (ADMELOG) 17 Unit(s) SubCutaneous before dinner  insulin lispro Injectable (ADMELOG) 17 Unit(s) SubCutaneous before lunch  insulin regular Infusion 3 Unit(s)/Hr (3 mL/Hr) IV Continuous <Continuous>  lidocaine   4% Patch 3 Patch Transdermal daily  magnesium oxide 400 milliGRAM(s) Oral three times a day with meals  melatonin 5 milliGRAM(s) Oral at bedtime  mycophenolate mofetil 1000 milliGRAM(s) Oral every 12 hours  NIFEdipine XL 60 milliGRAM(s) Oral every 12 hours  OLANZapine 5 milliGRAM(s) Oral at bedtime  pantoprazole    Tablet 40 milliGRAM(s) Oral every 24 hours  polyethylene glycol 3350 17 Gram(s) Oral daily  predniSONE   Tablet 15 milliGRAM(s) Oral every 12 hours  senna 2 Tablet(s) Oral at bedtime  tacrolimus 6 milliGRAM(s) Oral <User Schedule>  trimethoprim   80 mG/sulfamethoxazole 400 mG 1 Tablet(s) Oral daily  valGANciclovir 450 milliGRAM(s) Oral every 12 hours        insulin glargine Injectable (LANTUS)   33 Unit(s) SubCutaneous (01-08-24 @ 22:22)    insulin lispro (ADMELOG) corrective regimen sliding scale   6 Unit(s) SubCutaneous (01-09-24 @ 11:39)   8 Unit(s) SubCutaneous (01-09-24 @ 07:34)   2 Unit(s) SubCutaneous (01-08-24 @ 17:12)   4 Unit(s) SubCutaneous (01-08-24 @ 13:12)    insulin lispro Injectable (ADMELOG)   17 Unit(s) SubCutaneous (01-08-24 @ 17:18)    insulin lispro Injectable (ADMELOG)   17 Unit(s) SubCutaneous (01-09-24 @ 11:40)   17 Unit(s) SubCutaneous (01-08-24 @ 13:09)    predniSONE   Tablet   15 milliGRAM(s) Oral (01-09-24 @ 07:38)   15 milliGRAM(s) Oral (01-08-24 @ 20:13)        PHYSICAL EXAM:  VITALS: T(C): 36.4 (01-09-24 @ 11:36)  T(F): 97.5 (01-09-24 @ 11:36), Max: 98.2 (01-09-24 @ 06:10)  HR: 95 (01-09-24 @ 11:36) (88 - 98)  BP: 127/75 (01-09-24 @ 11:36) (95/60 - 131/70)  RR:  (16 - 20)  SpO2:  (94% - 97%)  Wt(kg): --  GENERAL: NAD MSI RUBA  Respiratory: Respirations unlabored   Extremities: Warm, no edema  NEURO: Alert , appropriate     LABS:  POCT Blood Glucose.: 255 mg/dL (01-09-24 @ 11:22)  POCT Blood Glucose.: 305 mg/dL (01-09-24 @ 07:12)  POCT Blood Glucose.: 304 mg/dL (01-09-24 @ 07:10)  POCT Blood Glucose.: 232 mg/dL (01-09-24 @ 02:08)  POCT Blood Glucose.: 176 mg/dL (01-08-24 @ 22:20)  POCT Blood Glucose.: 171 mg/dL (01-08-24 @ 17:04)  POCT Blood Glucose.: 212 mg/dL (01-08-24 @ 13:08)  POCT Blood Glucose.: 141 mg/dL (01-08-24 @ 07:35)  POCT Blood Glucose.: 116 mg/dL (01-08-24 @ 02:32)  POCT Blood Glucose.: 154 mg/dL (01-07-24 @ 23:15)  POCT Blood Glucose.: 83 mg/dL (01-07-24 @ 21:50)  POCT Blood Glucose.: 94 mg/dL (01-07-24 @ 11:32)  POCT Blood Glucose.: 178 mg/dL (01-07-24 @ 07:34)  POCT Blood Glucose.: 281 mg/dL (01-07-24 @ 02:19)  POCT Blood Glucose.: 278 mg/dL (01-06-24 @ 21:35)  POCT Blood Glucose.: 136 mg/dL (01-06-24 @ 19:14)  POCT Blood Glucose.: 181 mg/dL (01-06-24 @ 12:54)  POCT Blood Glucose.: 194 mg/dL (01-06-24 @ 12:51)                          9.8    7.99  )-----------( 200      ( 09 Jan 2024 08:39 )             29.9     01-09    132<L>  |  94<L>  |  44<H>  ----------------------------<  313<H>  4.6   |  23  |  1.17    Ca    9.5      09 Jan 2024 08:39  Phos  2.1     01-09  Mg     2.1     01-09    TPro  5.9<L>  /  Alb  3.8  /  TBili  0.2  /  DBili  x   /  AST  42<H>  /  ALT  68<H>  /  AlkPhos  58  01-09    eGFR: 72 mL/min/1.73m2 (09 Jan 2024 08:39)    11-10 Chol 130 Direct LDL -- LDL calculated 75 HDL 37<L> Trig 95, 11-03 Chol 198 Direct LDL -- LDL calculated 114<H> HDL 24<L> Trig 344<H>  Thyroid Function Tests:      A1C with Estimated Average Glucose Result: 6.1 % (01-02-24 @ 07:26)  A1C with Estimated Average Glucose Result: 8.3 % (11-01-23 @ 06:14)    Estimated Average Glucose: 128 mg/dL (01-02-24 @ 07:26)  Estimated Average Glucose: 192 mg/dL (11-01-23 @ 06:14)        Diet, NPO after Midnight:      NPO Start Date: 08-Jan-2024,   NPO Start Time: 23:59 (01-08-24 @ 14:54) [Active]  Diet, Consistent Carbohydrate/No Snacks:   1000mL Fluid Restriction ([id number]) (01-04-24 @ 13:57) [Active]

## 2024-01-09 NOTE — PROVIDER CONTACT NOTE (OTHER) - DATE AND TIME:
09-Jan-2024 15:52
09-Nov-2023 22:45
13-Nov-2023 11:00
14-Nov-2023 11:08
28-Dec-2023 16:42
02-Dec-2023 10:49
03-Jan-2024 15:20
07-Nov-2023 22:00
04-Dec-2023 13:30
13-Dec-2023 06:25
27-Nov-2023 04:51
20-Nov-2023 14:16
02-Dec-2023 22:00
25-Nov-2023 05:45
26-Nov-2023 04:41
02-Jan-2024 14:25
18-Dec-2023 15:15
22-Dec-2023 10:54

## 2024-01-09 NOTE — PROGRESS NOTE ADULT - SUBJECTIVE AND OBJECTIVE BOX
ADVANCED HEART FAILURE & TRANSPLANT- PROGRESS NOTE  *To reach the NS1 Team from 8am to 5pm, please call [phone].  ___________________________________________________________________________    Subjective:  - s/p RHC/EMBx    Medications:  acetaminophen     Tablet .. 650 milliGRAM(s) Oral every 6 hours PRN  budesonide  80 MICROgram(s)/formoterol 4.5 MICROgram(s) Inhaler 1 Puff(s) Inhalation two times a day  chlorhexidine 4% Liquid 1 Application(s) Topical <User Schedule>  dextrose 5%. 1000 milliLiter(s) IV Continuous <Continuous>  dextrose 5%. 1000 milliLiter(s) IV Continuous <Continuous>  dextrose 50% Injectable 50 milliLiter(s) IV Push every 15 minutes  dextrose Oral Gel 15 Gram(s) Oral once PRN  fluconAZOLE   Tablet 100 milliGRAM(s) Oral <User Schedule>  glucagon  Injectable 1 milliGRAM(s) IntraMuscular once  hydrALAZINE 25 milliGRAM(s) Oral every 8 hours  insulin glargine Injectable (LANTUS) 33 Unit(s) SubCutaneous at bedtime  insulin lispro (ADMELOG) corrective regimen sliding scale   SubCutaneous <User Schedule>  insulin lispro (ADMELOG) corrective regimen sliding scale   SubCutaneous three times a day before meals  insulin lispro Injectable (ADMELOG) 20 Unit(s) SubCutaneous before breakfast  insulin lispro Injectable (ADMELOG) 17 Unit(s) SubCutaneous before dinner  insulin lispro Injectable (ADMELOG) 17 Unit(s) SubCutaneous before lunch  insulin regular Infusion 3 Unit(s)/Hr IV Continuous <Continuous>  lidocaine   4% Patch 3 Patch Transdermal daily  magnesium oxide 400 milliGRAM(s) Oral three times a day with meals  melatonin 5 milliGRAM(s) Oral at bedtime  mycophenolate mofetil 1000 milliGRAM(s) Oral every 12 hours  NIFEdipine XL 60 milliGRAM(s) Oral every 12 hours  OLANZapine 5 milliGRAM(s) Oral at bedtime  pantoprazole    Tablet 40 milliGRAM(s) Oral every 24 hours  polyethylene glycol 3350 17 Gram(s) Oral daily  predniSONE   Tablet 15 milliGRAM(s) Oral every 12 hours  senna 2 Tablet(s) Oral at bedtime  tacrolimus 6 milliGRAM(s) Oral <User Schedule>  trimethoprim   80 mG/sulfamethoxazole 400 mG 1 Tablet(s) Oral daily  valGANciclovir 450 milliGRAM(s) Oral every 12 hours      Vitals:  Vital Signs Last 24 Hours  T(C): 36.5 (24 @ 07:40), Max: 36.8 (24 @ 06:10)  HR: 98 (24 @ 07:40) (88 - 98)  BP: 131/70 (24 @ 07:40) (95/60 - 131/70)  RR: 18 (24 @ 07:40) (18 - 18)  SpO2: 97% (24 @ 07:40) (94% - 97%)    Weight in k.8 ( @ 07:00)    I&O's Summary    2024 07:01  -  2024 07:00  --------------------------------------------------------  IN: 834 mL / OUT: 1700 mL / NET: -866 mL    2024 07:01  -  2024 08:42  --------------------------------------------------------  IN: 0 mL / OUT: 400 mL / NET: -400 mL      Physical Exam  General: No distress. Comfortable.  Neck: Neck supple. JVP not elevated. No masses  Chest: Clear to auscultation bilaterally  CV: Normal S1 and S2  Abdomen: Soft, non-distended, non-tender  Extremities: warm and perfused  Neurology: Alert and oriented times three.     Labs:                        11.5   12.02 )-----------( 232      ( 2024 07:20 )             33.1     0108    133<L>  |  89<L>  |  57<H>  ----------------------------<  133<H>  4.2   |  24  |  1.43<H>    Ca    9.5      2024 07:14  Phos  4.4       Mg     2.3         TPro  6.5  /  Alb  3.9  /  TBili  0.4  /  DBili  x   /  AST  24  /  ALT  48<H>  /  AlkPhos  58                        Imaging Studies  ADVANCED HEART FAILURE & TRANSPLANT- PROGRESS NOTE  *To reach the NS1 Team from 8am to 5pm, please call [phone].  ___________________________________________________________________________    Subjective:  - s/p RHC/EMBx    Medications:  acetaminophen     Tablet .. 650 milliGRAM(s) Oral every 6 hours PRN  budesonide  80 MICROgram(s)/formoterol 4.5 MICROgram(s) Inhaler 1 Puff(s) Inhalation two times a day  chlorhexidine 4% Liquid 1 Application(s) Topical <User Schedule>  dextrose 5%. 1000 milliLiter(s) IV Continuous <Continuous>  dextrose 5%. 1000 milliLiter(s) IV Continuous <Continuous>  dextrose 50% Injectable 50 milliLiter(s) IV Push every 15 minutes  dextrose Oral Gel 15 Gram(s) Oral once PRN  fluconAZOLE   Tablet 100 milliGRAM(s) Oral <User Schedule>  glucagon  Injectable 1 milliGRAM(s) IntraMuscular once  hydrALAZINE 25 milliGRAM(s) Oral every 8 hours  insulin glargine Injectable (LANTUS) 33 Unit(s) SubCutaneous at bedtime  insulin lispro (ADMELOG) corrective regimen sliding scale   SubCutaneous <User Schedule>  insulin lispro (ADMELOG) corrective regimen sliding scale   SubCutaneous three times a day before meals  insulin lispro Injectable (ADMELOG) 20 Unit(s) SubCutaneous before breakfast  insulin lispro Injectable (ADMELOG) 17 Unit(s) SubCutaneous before dinner  insulin lispro Injectable (ADMELOG) 17 Unit(s) SubCutaneous before lunch  insulin regular Infusion 3 Unit(s)/Hr IV Continuous <Continuous>  lidocaine   4% Patch 3 Patch Transdermal daily  magnesium oxide 400 milliGRAM(s) Oral three times a day with meals  melatonin 5 milliGRAM(s) Oral at bedtime  mycophenolate mofetil 1000 milliGRAM(s) Oral every 12 hours  NIFEdipine XL 60 milliGRAM(s) Oral every 12 hours  OLANZapine 5 milliGRAM(s) Oral at bedtime  pantoprazole    Tablet 40 milliGRAM(s) Oral every 24 hours  polyethylene glycol 3350 17 Gram(s) Oral daily  predniSONE   Tablet 15 milliGRAM(s) Oral every 12 hours  senna 2 Tablet(s) Oral at bedtime  tacrolimus 6 milliGRAM(s) Oral <User Schedule>  trimethoprim   80 mG/sulfamethoxazole 400 mG 1 Tablet(s) Oral daily  valGANciclovir 450 milliGRAM(s) Oral every 12 hours      Vitals:  Vital Signs Last 24 Hours  T(C): 36.5 (24 @ 07:40), Max: 36.8 (24 @ 06:10)  HR: 98 (24 @ 07:40) (88 - 98)  BP: 131/70 (24 @ 07:40) (95/60 - 131/70)  RR: 18 (24 @ 07:40) (18 - 18)  SpO2: 97% (24 @ 07:40) (94% - 97%)    Weight in k.8 ( @ 07:00)    I&O's Summary    2024 07:01  -  2024 07:00  --------------------------------------------------------  IN: 834 mL / OUT: 1700 mL / NET: -866 mL    2024 07:01  -  2024 08:42  --------------------------------------------------------  IN: 0 mL / OUT: 400 mL / NET: -400 mL      Physical Exam  General: No distress. Comfortable.  Neck: Neck supple. JVP not elevated. No masses  Chest: Clear to auscultation bilaterally  CV: Normal S1 and S2  Abdomen: Soft, non-distended, non-tender  Extremities: warm and perfused  Neurology: Alert and oriented times three.     Labs:                        11.5   12.02 )-----------( 232      ( 2024 07:20 )             33.1     0108    133<L>  |  89<L>  |  57<H>  ----------------------------<  133<H>  4.2   |  24  |  1.43<H>    Ca    9.5      2024 07:14  Phos  4.4       Mg     2.3         TPro  6.5  /  Alb  3.9  /  TBili  0.4  /  DBili  x   /  AST  24  /  ALT  48<H>  /  AlkPhos  58                        Imaging Studies  ADVANCED HEART FAILURE & TRANSPLANT- PROGRESS NOTE  *To reach the NS1 Team from 8am to 5pm, please call [phone].  ___________________________________________________________________________    Subjective:  - s/p RHC/EMBx    Medications:  acetaminophen     Tablet .. 650 milliGRAM(s) Oral every 6 hours PRN  budesonide  80 MICROgram(s)/formoterol 4.5 MICROgram(s) Inhaler 1 Puff(s) Inhalation two times a day  chlorhexidine 4% Liquid 1 Application(s) Topical <User Schedule>  dextrose 5%. 1000 milliLiter(s) IV Continuous <Continuous>  dextrose 5%. 1000 milliLiter(s) IV Continuous <Continuous>  dextrose 50% Injectable 50 milliLiter(s) IV Push every 15 minutes  dextrose Oral Gel 15 Gram(s) Oral once PRN  fluconAZOLE   Tablet 100 milliGRAM(s) Oral <User Schedule>  glucagon  Injectable 1 milliGRAM(s) IntraMuscular once  hydrALAZINE 25 milliGRAM(s) Oral every 8 hours  insulin glargine Injectable (LANTUS) 33 Unit(s) SubCutaneous at bedtime  insulin lispro (ADMELOG) corrective regimen sliding scale   SubCutaneous <User Schedule>  insulin lispro (ADMELOG) corrective regimen sliding scale   SubCutaneous three times a day before meals  insulin lispro Injectable (ADMELOG) 20 Unit(s) SubCutaneous before breakfast  insulin lispro Injectable (ADMELOG) 17 Unit(s) SubCutaneous before dinner  insulin lispro Injectable (ADMELOG) 17 Unit(s) SubCutaneous before lunch  insulin regular Infusion 3 Unit(s)/Hr IV Continuous <Continuous>  lidocaine   4% Patch 3 Patch Transdermal daily  magnesium oxide 400 milliGRAM(s) Oral three times a day with meals  melatonin 5 milliGRAM(s) Oral at bedtime  mycophenolate mofetil 1000 milliGRAM(s) Oral every 12 hours  NIFEdipine XL 60 milliGRAM(s) Oral every 12 hours  OLANZapine 5 milliGRAM(s) Oral at bedtime  pantoprazole    Tablet 40 milliGRAM(s) Oral every 24 hours  polyethylene glycol 3350 17 Gram(s) Oral daily  predniSONE   Tablet 15 milliGRAM(s) Oral every 12 hours  senna 2 Tablet(s) Oral at bedtime  tacrolimus 6 milliGRAM(s) Oral <User Schedule>  trimethoprim   80 mG/sulfamethoxazole 400 mG 1 Tablet(s) Oral daily  valGANciclovir 450 milliGRAM(s) Oral every 12 hours      Vitals:  Vital Signs Last 24 Hours  T(C): 36.5 (24 @ 07:40), Max: 36.8 (24 @ 06:10)  HR: 98 (24 @ 07:40) (88 - 98)  BP: 131/70 (24 @ 07:40) (95/60 - 131/70)  RR: 18 (24 @ 07:40) (18 - 18)  SpO2: 97% (24 @ 07:40) (94% - 97%)    Weight in k.8 ( @ 07:00)    I&O's Summary    2024 07:01  -  2024 07:00  --------------------------------------------------------  IN: 834 mL / OUT: 1700 mL / NET: -866 mL    2024 07:01  -  2024 08:42  --------------------------------------------------------  IN: 0 mL / OUT: 400 mL / NET: -400 mL      Physical Exam  General: No distress. Comfortable.  Neck: Neck supple. JVP not elevated. No masses  Chest: Clear to auscultation bilaterally  CV: Normal S1 and S2  Abdomen: Soft, non-distended, non-tender  Extremities: warm and perfused  Neurology: Alert and oriented times three.     Labs:                        11.5   12.02 )-----------( 232      ( 2024 07:20 )             33.1     0108    133<L>  |  89<L>  |  57<H>  ----------------------------<  133<H>  4.2   |  24  |  1.43<H>    Ca    9.5      2024 07:14  Phos  4.4       Mg     2.3         TPro  6.5  /  Alb  3.9  /  TBili  0.4  /  DBili  x   /  AST  24  /  ALT  48<H>  /  AlkPhos  58                Discharge Summary  59 yo M with hx of HFrEF, CAD s/p PCI (), HTN, DMT2 (A1c 8.3%) and CVA s/p TPA (), recently treated for PNA who initially presented to [facility] in acute decompensated heart failure. Initally has IABP placed however weaned to off . The following day developed PMVT cardiac arrest with prompt CPR and defibrillation, started on IV Lidocaine/Amio and IABP ultimately replaced on . During this admission was found to be bacteremic for which she was treated for Staph epi, Enterococcus faecalis, Cutibacterium with IV abx.  He underwent heart transplant eval and was accepted as status 2, ABO A, PRA 0% ().     A suitable donor was identified and on , he underwent OHT (ischemic Time: 253min, CMV -/+, Toxo -/-). The following day, extubated and IABP removed. His first RHC/EMBx with revealed elevated filling pressures and normal cardiac output. His diuretics were adjusted and he is responding well. His course was complicated by leukocytosis however infectious workup was negative. CT scan only revealed significant constipation and now having regular bowel movement. During this admission he was noted to be hypergylecmic for which Endo is closely following and adjusting his lantus. He underwent his second biopsy which revealed normal filling pressures and will plan for discharge home today.       Follow up  1. Will return on  for routine RHC/EMBx  2. Plan to remove Right groin suture during this visit. Please call Dr. Zamora when patient has arrived as he will be the one to take out suture  3. Endo follow up being arranged for . If patient has elevated finger sticks he was instructed to call endo office at [phone] ADVANCED HEART FAILURE & TRANSPLANT- PROGRESS NOTE  *To reach the NS1 Team from 8am to 5pm, please call [phone].  ___________________________________________________________________________    Subjective:  - s/p RHC/EMBx    Medications:  acetaminophen     Tablet .. 650 milliGRAM(s) Oral every 6 hours PRN  budesonide  80 MICROgram(s)/formoterol 4.5 MICROgram(s) Inhaler 1 Puff(s) Inhalation two times a day  chlorhexidine 4% Liquid 1 Application(s) Topical <User Schedule>  dextrose 5%. 1000 milliLiter(s) IV Continuous <Continuous>  dextrose 5%. 1000 milliLiter(s) IV Continuous <Continuous>  dextrose 50% Injectable 50 milliLiter(s) IV Push every 15 minutes  dextrose Oral Gel 15 Gram(s) Oral once PRN  fluconAZOLE   Tablet 100 milliGRAM(s) Oral <User Schedule>  glucagon  Injectable 1 milliGRAM(s) IntraMuscular once  hydrALAZINE 25 milliGRAM(s) Oral every 8 hours  insulin glargine Injectable (LANTUS) 33 Unit(s) SubCutaneous at bedtime  insulin lispro (ADMELOG) corrective regimen sliding scale   SubCutaneous <User Schedule>  insulin lispro (ADMELOG) corrective regimen sliding scale   SubCutaneous three times a day before meals  insulin lispro Injectable (ADMELOG) 20 Unit(s) SubCutaneous before breakfast  insulin lispro Injectable (ADMELOG) 17 Unit(s) SubCutaneous before dinner  insulin lispro Injectable (ADMELOG) 17 Unit(s) SubCutaneous before lunch  insulin regular Infusion 3 Unit(s)/Hr IV Continuous <Continuous>  lidocaine   4% Patch 3 Patch Transdermal daily  magnesium oxide 400 milliGRAM(s) Oral three times a day with meals  melatonin 5 milliGRAM(s) Oral at bedtime  mycophenolate mofetil 1000 milliGRAM(s) Oral every 12 hours  NIFEdipine XL 60 milliGRAM(s) Oral every 12 hours  OLANZapine 5 milliGRAM(s) Oral at bedtime  pantoprazole    Tablet 40 milliGRAM(s) Oral every 24 hours  polyethylene glycol 3350 17 Gram(s) Oral daily  predniSONE   Tablet 15 milliGRAM(s) Oral every 12 hours  senna 2 Tablet(s) Oral at bedtime  tacrolimus 6 milliGRAM(s) Oral <User Schedule>  trimethoprim   80 mG/sulfamethoxazole 400 mG 1 Tablet(s) Oral daily  valGANciclovir 450 milliGRAM(s) Oral every 12 hours      Vitals:  Vital Signs Last 24 Hours  T(C): 36.5 (24 @ 07:40), Max: 36.8 (24 @ 06:10)  HR: 98 (24 @ 07:40) (88 - 98)  BP: 131/70 (24 @ 07:40) (95/60 - 131/70)  RR: 18 (24 @ 07:40) (18 - 18)  SpO2: 97% (24 @ 07:40) (94% - 97%)    Weight in k.8 ( @ 07:00)    I&O's Summary    2024 07:01  -  2024 07:00  --------------------------------------------------------  IN: 834 mL / OUT: 1700 mL / NET: -866 mL    2024 07:01  -  2024 08:42  --------------------------------------------------------  IN: 0 mL / OUT: 400 mL / NET: -400 mL      Physical Exam  General: No distress. Comfortable.  Neck: Neck supple. JVP not elevated. No masses  Chest: Clear to auscultation bilaterally  CV: Normal S1 and S2  Abdomen: Soft, non-distended, non-tender  Extremities: warm and perfused  Neurology: Alert and oriented times three.     Labs:                        11.5   12.02 )-----------( 232      ( 2024 07:20 )             33.1     0108    133<L>  |  89<L>  |  57<H>  ----------------------------<  133<H>  4.2   |  24  |  1.43<H>    Ca    9.5      2024 07:14  Phos  4.4       Mg     2.3         TPro  6.5  /  Alb  3.9  /  TBili  0.4  /  DBili  x   /  AST  24  /  ALT  48<H>  /  AlkPhos  58                Discharge Summary  59 yo M with hx of HFrEF, CAD s/p PCI (), HTN, DMT2 (A1c 8.3%) and CVA s/p TPA (), recently treated for PNA who initially presented to [facility] in acute decompensated heart failure. Initally has IABP placed however weaned to off . The following day developed PMVT cardiac arrest with prompt CPR and defibrillation, started on IV Lidocaine/Amio and IABP ultimately replaced on . During this admission was found to be bacteremic for which she was treated for Staph epi, Enterococcus faecalis, Cutibacterium with IV abx.  He underwent heart transplant eval and was accepted as status 2, ABO A, PRA 0% ().     A suitable donor was identified and on , he underwent OHT (ischemic Time: 253min, CMV -/+, Toxo -/-). The following day, extubated and IABP removed. His first RHC/EMBx with revealed elevated filling pressures and normal cardiac output. His diuretics were adjusted and he is responding well. His course was complicated by leukocytosis however infectious workup was negative. CT scan only revealed significant constipation and now having regular bowel movement. During this admission he was noted to be hypergylecmic for which Endo is closely following and adjusting his lantus. He underwent his second biopsy which revealed normal filling pressures and will plan for discharge home today.       Follow up  1. Will return on  for routine RHC/EMBx  2. Plan to remove Right groin suture during this visit. Please call Dr. Zamora when patient has arrived as he will be the one to take out suture  3. Endo follow up being arranged for . If patient has elevated finger sticks he was instructed to call endo office at [phone] ADVANCED HEART FAILURE & TRANSPLANT- PROGRESS NOTE  *To reach the NS1 Team from 8am to 5pm, please call [phone].  ___________________________________________________________________________    Subjective:  - s/p RHC/EMBx    Medications:  acetaminophen     Tablet .. 650 milliGRAM(s) Oral every 6 hours PRN  budesonide  80 MICROgram(s)/formoterol 4.5 MICROgram(s) Inhaler 1 Puff(s) Inhalation two times a day  chlorhexidine 4% Liquid 1 Application(s) Topical <User Schedule>  dextrose 5%. 1000 milliLiter(s) IV Continuous <Continuous>  dextrose 5%. 1000 milliLiter(s) IV Continuous <Continuous>  dextrose 50% Injectable 50 milliLiter(s) IV Push every 15 minutes  dextrose Oral Gel 15 Gram(s) Oral once PRN  fluconAZOLE   Tablet 100 milliGRAM(s) Oral <User Schedule>  glucagon  Injectable 1 milliGRAM(s) IntraMuscular once  hydrALAZINE 25 milliGRAM(s) Oral every 8 hours  insulin glargine Injectable (LANTUS) 33 Unit(s) SubCutaneous at bedtime  insulin lispro (ADMELOG) corrective regimen sliding scale   SubCutaneous <User Schedule>  insulin lispro (ADMELOG) corrective regimen sliding scale   SubCutaneous three times a day before meals  insulin lispro Injectable (ADMELOG) 20 Unit(s) SubCutaneous before breakfast  insulin lispro Injectable (ADMELOG) 17 Unit(s) SubCutaneous before dinner  insulin lispro Injectable (ADMELOG) 17 Unit(s) SubCutaneous before lunch  insulin regular Infusion 3 Unit(s)/Hr IV Continuous <Continuous>  lidocaine   4% Patch 3 Patch Transdermal daily  magnesium oxide 400 milliGRAM(s) Oral three times a day with meals  melatonin 5 milliGRAM(s) Oral at bedtime  mycophenolate mofetil 1000 milliGRAM(s) Oral every 12 hours  NIFEdipine XL 60 milliGRAM(s) Oral every 12 hours  OLANZapine 5 milliGRAM(s) Oral at bedtime  pantoprazole    Tablet 40 milliGRAM(s) Oral every 24 hours  polyethylene glycol 3350 17 Gram(s) Oral daily  predniSONE   Tablet 15 milliGRAM(s) Oral every 12 hours  senna 2 Tablet(s) Oral at bedtime  tacrolimus 6 milliGRAM(s) Oral <User Schedule>  trimethoprim   80 mG/sulfamethoxazole 400 mG 1 Tablet(s) Oral daily  valGANciclovir 450 milliGRAM(s) Oral every 12 hours      Vitals:  Vital Signs Last 24 Hours  T(C): 36.5 (24 @ 07:40), Max: 36.8 (24 @ 06:10)  HR: 98 (24 @ 07:40) (88 - 98)  BP: 131/70 (24 @ 07:40) (95/60 - 131/70)  RR: 18 (24 @ 07:40) (18 - 18)  SpO2: 97% (24 @ 07:40) (94% - 97%)    Weight in k.8 ( @ 07:00)    I&O's Summary    2024 07:01  -  2024 07:00  --------------------------------------------------------  IN: 834 mL / OUT: 1700 mL / NET: -866 mL    2024 07:01  -  2024 08:42  --------------------------------------------------------  IN: 0 mL / OUT: 400 mL / NET: -400 mL      Physical Exam  General: No distress. Comfortable.  Neck: Neck supple. JVP not elevated. No masses  Chest: Clear to auscultation bilaterally  CV: Normal S1 and S2  Abdomen: Soft, non-distended, non-tender  Extremities: warm and perfused  Neurology: Alert and oriented times three.     Labs:                        11.5   12.02 )-----------( 232      ( 2024 07:20 )             33.1     0108    133<L>  |  89<L>  |  57<H>  ----------------------------<  133<H>  4.2   |  24  |  1.43<H>    Ca    9.5      2024 07:14  Phos  4.4       Mg     2.3         TPro  6.5  /  Alb  3.9  /  TBili  0.4  /  DBili  x   /  AST  24  /  ALT  48<H>  /  AlkPhos  58                Discharge Summary  59 yo M with hx of HFrEF, CAD s/p PCI (), HTN, DMT2 (A1c 8.3%) and CVA s/p TPA (), recently treated for PNA who initially presented to [facility] in acute decompensated heart failure. Initally has IABP placed however weaned to off . The following day developed PMVT cardiac arrest with prompt CPR and defibrillation, started on IV Lidocaine/Amio and IABP ultimately replaced on . During this admission was found to be bacteremic for which she was treated for Staph epi, Enterococcus faecalis, Cutibacterium with IV abx.  He underwent heart transplant eval and was accepted as status 2, ABO A, PRA 0% ().     A suitable donor was identified and on , he underwent OHT (ischemic Time: 253min, CMV -/+, Toxo -/-). The following day, extubated and IABP removed. His first RHC/EMBx with revealed elevated filling pressures and normal cardiac output. His diuretics were adjusted and he is responding well. His course was complicated by leukocytosis however infectious workup was negative. CT scan only revealed significant constipation and now having regular bowel movement. During this admission he was noted to be hyperglycemic for which endo was closely following and adjusting his lantus. He underwent his second biopsy which revealed normal filling pressures and will plan for discharge home today.       Follow up  1. Will obtain blood work on   2. Will return on  for routine RHC/EMBx  3. Plan to remove Right groin suture during this visit. Please call Dr. Zamora when patient has arrived as he will be the one to take out suture  4. Endo follow up being arranged for . If patient has elevated finger sticks he was instructed to call endo office at [phone]    Medications:  Fluconazole 100 mg PO QD  Bactrim S/S PO QD  Valcyte 450 mg PO QBID  Hydralazine 25 mg PO TID  Procardia 60 mg PO BID  Olanzapine 5 mg PO QD  Lantus 30units and ISS   Tacro 6 mg PO BID  Mag Oxide 400 mg TID  Pantoprazole 40 mg PO QD  Cellcept 1g PO BID  Prednisone 12.5 mg PO BID  Calcium Citrate  MVI  Currently off ASA and statin, consider starting at next RHC/EMBx   ADVANCED HEART FAILURE & TRANSPLANT- PROGRESS NOTE  *To reach the NS1 Team from 8am to 5pm, please call [phone].  ___________________________________________________________________________    Subjective:  - s/p RHC/EMBx    Medications:  acetaminophen     Tablet .. 650 milliGRAM(s) Oral every 6 hours PRN  budesonide  80 MICROgram(s)/formoterol 4.5 MICROgram(s) Inhaler 1 Puff(s) Inhalation two times a day  chlorhexidine 4% Liquid 1 Application(s) Topical <User Schedule>  dextrose 5%. 1000 milliLiter(s) IV Continuous <Continuous>  dextrose 5%. 1000 milliLiter(s) IV Continuous <Continuous>  dextrose 50% Injectable 50 milliLiter(s) IV Push every 15 minutes  dextrose Oral Gel 15 Gram(s) Oral once PRN  fluconAZOLE   Tablet 100 milliGRAM(s) Oral <User Schedule>  glucagon  Injectable 1 milliGRAM(s) IntraMuscular once  hydrALAZINE 25 milliGRAM(s) Oral every 8 hours  insulin glargine Injectable (LANTUS) 33 Unit(s) SubCutaneous at bedtime  insulin lispro (ADMELOG) corrective regimen sliding scale   SubCutaneous <User Schedule>  insulin lispro (ADMELOG) corrective regimen sliding scale   SubCutaneous three times a day before meals  insulin lispro Injectable (ADMELOG) 20 Unit(s) SubCutaneous before breakfast  insulin lispro Injectable (ADMELOG) 17 Unit(s) SubCutaneous before dinner  insulin lispro Injectable (ADMELOG) 17 Unit(s) SubCutaneous before lunch  insulin regular Infusion 3 Unit(s)/Hr IV Continuous <Continuous>  lidocaine   4% Patch 3 Patch Transdermal daily  magnesium oxide 400 milliGRAM(s) Oral three times a day with meals  melatonin 5 milliGRAM(s) Oral at bedtime  mycophenolate mofetil 1000 milliGRAM(s) Oral every 12 hours  NIFEdipine XL 60 milliGRAM(s) Oral every 12 hours  OLANZapine 5 milliGRAM(s) Oral at bedtime  pantoprazole    Tablet 40 milliGRAM(s) Oral every 24 hours  polyethylene glycol 3350 17 Gram(s) Oral daily  predniSONE   Tablet 15 milliGRAM(s) Oral every 12 hours  senna 2 Tablet(s) Oral at bedtime  tacrolimus 6 milliGRAM(s) Oral <User Schedule>  trimethoprim   80 mG/sulfamethoxazole 400 mG 1 Tablet(s) Oral daily  valGANciclovir 450 milliGRAM(s) Oral every 12 hours      Vitals:  Vital Signs Last 24 Hours  T(C): 36.5 (24 @ 07:40), Max: 36.8 (24 @ 06:10)  HR: 98 (24 @ 07:40) (88 - 98)  BP: 131/70 (24 @ 07:40) (95/60 - 131/70)  RR: 18 (24 @ 07:40) (18 - 18)  SpO2: 97% (24 @ 07:40) (94% - 97%)    Weight in k.8 ( @ 07:00)    I&O's Summary    2024 07:01  -  2024 07:00  --------------------------------------------------------  IN: 834 mL / OUT: 1700 mL / NET: -866 mL    2024 07:01  -  2024 08:42  --------------------------------------------------------  IN: 0 mL / OUT: 400 mL / NET: -400 mL      Physical Exam  General: No distress. Comfortable.  Neck: Neck supple. JVP not elevated. No masses  Chest: Clear to auscultation bilaterally  CV: Normal S1 and S2  Abdomen: Soft, non-distended, non-tender  Extremities: warm and perfused  Neurology: Alert and oriented times three.     Labs:                        11.5   12.02 )-----------( 232      ( 2024 07:20 )             33.1     0108    133<L>  |  89<L>  |  57<H>  ----------------------------<  133<H>  4.2   |  24  |  1.43<H>    Ca    9.5      2024 07:14  Phos  4.4       Mg     2.3         TPro  6.5  /  Alb  3.9  /  TBili  0.4  /  DBili  x   /  AST  24  /  ALT  48<H>  /  AlkPhos  58                Discharge Summary  59 yo M with hx of HFrEF, CAD s/p PCI (), HTN, DMT2 (A1c 8.3%) and CVA s/p TPA (), recently treated for PNA who initially presented to [facility] in acute decompensated heart failure. Initally has IABP placed however weaned to off . The following day developed PMVT cardiac arrest with prompt CPR and defibrillation, started on IV Lidocaine/Amio and IABP ultimately replaced on . During this admission was found to be bacteremic for which she was treated for Staph epi, Enterococcus faecalis, Cutibacterium with IV abx.  He underwent heart transplant eval and was accepted as status 2, ABO A, PRA 0% ().     A suitable donor was identified and on , he underwent OHT (ischemic Time: 253min, CMV -/+, Toxo -/-). The following day, extubated and IABP removed. His first RHC/EMBx with revealed elevated filling pressures and normal cardiac output. His diuretics were adjusted and he is responding well. His course was complicated by leukocytosis however infectious workup was negative. CT scan only revealed significant constipation and now having regular bowel movement. During this admission he was noted to be hyperglycemic for which endo was closely following and adjusting his lantus. He underwent his second biopsy which revealed normal filling pressures and will plan for discharge home today.       Follow up  1. Will obtain blood work on   2. Will return on  for routine RHC/EMBx  3. Plan to remove Right groin suture during this visit. Please call Dr. Zamora when patient has arrived as he will be the one to take out suture  4. Endo follow up being arranged for . If patient has elevated finger sticks he was instructed to call endo office at [phone]    Medications:  Fluconazole 100 mg PO QD  Bactrim S/S PO QD  Valcyte 450 mg PO QBID  Hydralazine 25 mg PO TID  Procardia 60 mg PO BID  Olanzapine 5 mg PO QD  Lantus 30units and ISS   Tacro 6 mg PO BID  Mag Oxide 400 mg TID  Pantoprazole 40 mg PO QD  Cellcept 1g PO BID  Prednisone 12.5 mg PO BID  Calcium Citrate  MVI  Currently off ASA and statin, consider starting at next RHC/EMBx

## 2024-01-09 NOTE — DISCHARGE NOTE PROVIDER - CARE PROVIDER_API CALL
Luis Zamora  Thoracic and Cardiac Surgery  [address] [place] [id number]  Phone: [phone]  Fax: [phone]  Follow Up Time:    Luis Zamora  Thoracic and Cardiac Surgery  [address] [place] [id number]  Phone: [phone]  Fax: [phone]  Follow Up Time:

## 2024-01-09 NOTE — PROGRESS NOTE ADULT - TIME BILLING
face-to-face encounter, review of extensive medical records in this and prior charts, laboratory findings, radiographic and microbiology results; documentation as noted above and discussion of diagnostic impressions and plan with the patient and team
Review of results. Contacting microbiology lab. Discussing results with patient. Coordination with team
face-to-face encounter, review of extensive medical records in this and prior charts, laboratory findings, radiographic and microbiology results; documentation as noted above and discussion of diagnostic impressions and plan with the patient and team
face-to-face encounter, review of extensive medical records in this and prior charts, laboratory findings, radiographic and microbiology results; documentation as noted above and discussion of diagnostic impressions and plan with the patient and team
tigre
- Generation of cardiovascular treatment plan.  - Coordination of care with primary team.
sheldon
- Review of records, telemetry, vital signs and daily labs.   - General and cardiovascular physical examination.  - Generation of cardiovascular treatment plan.  - Coordination of care with primary team.
- Generation of cardiovascular treatment plan.  - Coordination of care with primary team.
- Review of notes/records, telemetry, vital signs and daily labs.   - General and cardiovascular physical examination.  - Generation of cardiovascular treatment plan.  - Coordination of care with primary team.

## 2024-01-09 NOTE — DISCHARGE NOTE PROVIDER - HOSPITAL COURSE
Assessment and Plan:   · Assessment	  58M : HFrEF (LVIDd 6.4 cm, LVEF 32%), PCI ('08), HTN, DMT2 (A1c 8.3%) and CVA s/p TPA ('18), recently treated for PNA who initially presented to [facility] via EMS after syncope reportedly requiring defibrillation. Treated for ACS but left AMA to come to [facility]. On arrival ECG with ST depression in lateral leads. Intubated 11/1 for respiratory failure and was found to have COVID. L/RHC 11/1revealing  of LAD and RCA, elevated filling pressures and CI of 1.5 prompting placement of IABP. Extubated 11/3. IABP was weaned to off 11/7, however the following day developed PMVT cardiac arrest with prompt CPR and defibrillation, started on IV Lidocaine/Amio and IABP ultimately replaced on 11/9. During this admission was found to be bacteremic for which she was treated for Staph epi, Enterococcus faecalis, Cutibacterium with IV abx.  Was listed Status 2, ABO A, PRA 0% (12/12).     A suitable donor was identified and on 12/25, he underwent OHT (ischemic Time: 253min, CMV -/+, Toxo -/-). The following day, extubated and IABP removed. Milrinone was being gradually weaned but when turned off yesterday, despite adequate perfusion indicies, had rise in lactate for which inotropic support was resumed. Currently appears well supported and compensated. Will again trial wean off inotrope. Will also adjust oral antihypertensive regimen with goal to wean off Cardene gtt. Plan for 1st EMBX next week on 1/2.     On 12/25/23, pt underwent OHT  - IABP removed 12/26  - Sabrina and Epi weaned off 12/26  - Milrinone off  - Bumex TID  - 1st RHC/EMBx on Tuesday 1/2  - Transfer to Stepdown 12/31  -  Right femerol rangel in place   - Med CT x 3 - LWS      insulin gtt d/t hyperglycemia - house endo following   - d/c planning - aniticipate home   1/1/24 - VSS - pt. c/o uncomfortable bed last evening - stating he would sign out AMA if he was not given a more comfortable bed.  staff obliged & a new bed was brought in.  pt requesting new urinal with each void as he "is at risk for an infection"  Ptt properly educated on post transplant risks.  states, "A doctor told me I can only use a urinal once to prevent infection"  transplant team to reinforce post-transplant precautions. MED CT x 3 in place  ?d/c femerol rangel today - will rounds with transplant team  1/2/24 VSS - NPO for cardiac bx -rhc this am-  pt refused blood draw this am despite provider discussing the importance of blood levels post transplant.  Pt acquiesced - allowed blood draw @ 7:30a-d/c planning -   1/3 s/p biopsy and rhc yesterday Ms ct x 1 remains in place.  Glucose improved, currentyly off insulin infusion  1/4/23   F/u cultures, rvp neg Glucose stable overnight MS ct remains in place  1/5  VSS  afebrile Na+ 133.  Wbc 16.  Ct c/a/p results pnd.  RVP neg.  BC-ngtd.  -200cc/24.  BS 99.  Tacro 8.2  1/6 VSS  afebrile  Na 132 Wbc 18.2.  UA ordered.   CT a/p/c mild stercoral colitis.  -1.7L  BC NG 48 hrs.  Tacro 10.6  1/7 VSS, WBC 14.1, afebrile, UA negative, , Bumex dc'd. Repeat BMP this afternoon. Plan for Golytely & enema today, pending BM. Check Abd Xray. Tacro level 12.2.  1/8     vss     one large BM  this am     tac level13        1/9   vss   cardiac BX                    Assessment and Plan:   · Assessment	  58M : HFrEF (LVIDd 6.4 cm, LVEF 32%), PCI ('08), HTN, DMT2 (A1c 8.3%) and CVA s/p TPA ('18), recently treated for PNA who initially presented to [facility] via EMS after syncope reportedly requiring defibrillation. Treated for ACS but left AMA to come to [facility]. On arrival ECG with ST depression in lateral leads. Intubated 11/1 for respiratory failure and was found to have COVID. L/RHC 11/1revealing  of LAD and RCA, elevated filling pressures and CI of 1.5 prompting placement of IABP. Extubated 11/3. IABP was weaned to off 11/7, however the following day developed PMVT cardiac arrest with prompt CPR and defibrillation, started on IV Lidocaine/Amio and IABP ultimately replaced on 11/9. During this admission was found to be bacteremic for which she was treated for Staph epi, Enterococcus faecalis, Cutibacterium with IV abx.  Was listed Status 2, ABO A, PRA 0% (12/12).     A suitable donor was identified and on 12/25, he underwent OHT (ischemic Time: 253min, CMV -/+, Toxo -/-). The following day, extubated and IABP removed. Milrinone was being gradually weaned but when turned off yesterday, despite adequate perfusion indicies, had rise in lactate for which inotropic support was resumed. Currently appears well supported and compensated. Will again trial wean off inotrope. Will also adjust oral antihypertensive regimen with goal to wean off Cardene gtt. Plan for 1st EMBX next week on 1/2.     On 12/25/23, pt underwent OHT  - IABP removed 12/26  - Sabrina and Epi weaned off 12/26  - Milrinone off  - Bumex TID  - 1st RHC/EMBx on Tuesday 1/2  - Transfer to Stepdown 12/31  -  Right femerol rangel in place   - Med CT x 3 - LWS      insulin gtt d/t hyperglycemia - house endo following   - d/c planning - aniticipate home   1/1/24 - VSS - pt. c/o uncomfortable bed last evening - stating he would sign out AMA if he was not given a more comfortable bed.  staff obliged & a new bed was brought in.  pt requesting new urinal with each void as he "is at risk for an infection"  Ptt properly educated on post transplant risks.  states, "A doctor told me I can only use a urinal once to prevent infection"  transplant team to reinforce post-transplant precautions. MED CT x 3 in place  ?d/c femerol rangel today - will rounds with transplant team  1/2/24 VSS - NPO for cardiac bx -rhc this am-  pt refused blood draw this am despite provider discussing the importance of blood levels post transplant.  Pt acquiesced - allowed blood draw @ 7:30a-d/c planning -   1/3 s/p biopsy and rhc yesterday Ms ct x 1 remains in place.  Glucose improved, currentyly off insulin infusion  1/4/23   F/u cultures, rvp neg Glucose stable overnight MS ct remains in place  1/5  VSS  afebrile Na+ 133.  Wbc 16.  Ct c/a/p results pnd.  RVP neg.  BC-ngtd.  -200cc/24.  BS 99.  Tacro 8.2  1/6 VSS  afebrile  Na 132 Wbc 18.2.  UA ordered.   CT a/p/c mild stercoral colitis.  -1.7L  BC NG 48 hrs.  Tacro 10.6  1/7 VSS, WBC 14.1, afebrile, UA negative, , Bumex dc'd. Repeat BMP this afternoon. Plan for Golytely & enema today, pending BM. Check Abd Xray. Tacro level 12.2.  1/8     vss     one large BM  this am     tac level13        1/9   vss   cardiac BX

## 2024-01-09 NOTE — PROVIDER CONTACT NOTE (OTHER) - NAME OF MD/NP/PA/DO NOTIFIED:
Dr. Ball
En Durand
Kirsty Davis, NP
Kirsty GAMA
Dr. Ball
Kirsty Davis, NP
Dr. Agarwal
Dr. Oliveros
Dr. Martins
Laura Mcclendon
Rangel Guan
Dr. Agarwal
KARYN Brizuela and LANETTE Davis
Dr. Espana
Russ Boyce PA
Dr. Walter
Dr. Philip

## 2024-01-09 NOTE — DISCHARGE NOTE NURSING/CASE MANAGEMENT/SOCIAL WORK - NSDCVIVACCINE_GEN_ALL_CORE_FT
Hep A, adult; 24-Nov-2023 15:07; Jeffy Bustos (RN); Motigaine; HR4RB (Exp. Date: 18-Dec-2025); IntraMuscular; Deltoid Left.; 1 milliLiter(s); VIS (VIS Published: 18-Dec-2025, VIS Presented: 24-Nov-2023);   HepB-CpG; 26-Nov-2023 05:48; Anup Zamarripa (RN); [employer] [id number] (Exp. Date: 30-Nov-2025); IntraMuscular; Deltoid Right.; 20 MICROGram(s); VIS (VIS Published: 26-Nov-2023, VIS Presented: 26-Nov-2023);   Tdap; 26-Nov-2023 22:33; Anup Zamarripa (RN); [employer]; 54CP2 (Exp. Date: 11-Jan-2026); IntraMuscular; Deltoid Left.; 0.5 milliLiter(s); VIS (VIS Published: 09-May-2013, VIS Presented: 26-Nov-2023);   zoster recombinant; 01-Dec-2023 06:14; Shruthi Chacko (RN); Motigaine;  (Exp. Date: 22-Nov-2025); IntraMuscular; Deltoid Right.; 0.5 milliLiter(s); VIS (VIS Published: 17-Oct-2023, VIS Presented: 01-Dec-2023);    Hep A, adult; 24-Nov-2023 15:07; Jeffy Bustos (RN); [employer]; HR4RB (Exp. Date: 18-Dec-2025); IntraMuscular; Deltoid Left.; 1 milliLiter(s); VIS (VIS Published: 18-Dec-2025, VIS Presented: 24-Nov-2023);   HepB-CpG; 26-Nov-2023 05:48; Anup Zamarripa (RN); Dyn[employer]ax[employer] Inc.; [id number] (Exp. Date: 30-Nov-2025); IntraMuscular; Deltoid Right.; 20 MICROGram(s); VIS (VIS Published: 26-Nov-2023, VIS Presented: 26-Nov-2023);   Tdap; 26-Nov-2023 22:33; Anup Zamarripa (RN); [employer]; 54CP2 (Exp. Date: 11-Jan-2026); IntraMuscular; Deltoid Left.; 0.5 milliLiter(s); VIS (VIS Published: 09-May-2013, VIS Presented: 26-Nov-2023);   zoster recombinant; 01-Dec-2023 06:14; Shruthi Chacko (RN); [employer];  (Exp. Date: 22-Nov-2025); IntraMuscular; Deltoid Right.; 0.5 milliLiter(s); VIS (VIS Published: 17-Oct-2023, VIS Presented: 01-Dec-2023);

## 2024-01-09 NOTE — PROVIDER CONTACT NOTE (OTHER) - SITUATION
Educated the patient on post-transplant care topics including medication, food, and lifestyle.
Nighttime assessment, patient is uncooperative, aggressive, restless, cursing and screaming at RN.
pt refusing morning x-ray
Met with patient to discuss any questions the patient has about heart transplant/LVAD evaluation.
Patient educated on post-transplant care topics including medication, food, and lifestyle.
patient refusing daily care and refusing to wear SPO2 because "its annoying and I don't need to wear it."
Patient was reporting bladder fullness. Urine output from holt was seen to be decreased and a bladder scan was done. It showed 380ml of urine was being retained.
Educated the patient on post-transplant care topics including medication, food, and lifestyle.
Patient refusing AM care and wash.
Met with patient  for approximately 20 minutes to discuss heart transplant/LVAD evaluation.
Pt refused CHG Wash
pt noncompliant with IV change, refuses am care and chlorohexadine wash, noncompliant with diabetes diet eating high sugar items
Educated the patient and patient's wife on post-transplant care topics including medication, food, and lifestyle.
Patient has been medically cleared by the multidisciplinary team to be discharged home. I met with the patient and patient's wife for 60 minutes to review discharge plan.
Patient refusing AM care and wash.
Pt HR fluctuating between 110-130s.
Pt refusing PIV, midline, and AM care

## 2024-01-09 NOTE — DISCHARGE NOTE PROVIDER - NSDCCPCAREPLAN_GEN_ALL_CORE_FT
PRINCIPAL DISCHARGE DIAGNOSIS  Diagnosis: Congestive heart failure, severe  Assessment and Plan of Treatment: sp  heart transplant

## 2024-01-09 NOTE — PROGRESS NOTE ADULT - PROBLEM SELECTOR PLAN 1
- Procardia XL 60 mg BID  EMBx   today  - DC plan Home    wednesday - Procardia XL 60 mg BID  EMBx   today   awaiting bx results  - DC plan Home    wednesday

## 2024-01-09 NOTE — PROGRESS NOTE ADULT - SUBJECTIVE AND OBJECTIVE BOX
VITAL SIGNS    Telemetry:    st  110    Vital Signs Last 24 Hrs  T(C): 36.6 (01-08-24 @ 20:08), Max: 36.6 (01-08-24 @ 20:08)  T(F): 97.8 (01-08-24 @ 20:08), Max: 97.8 (01-08-24 @ 20:08)  HR: 92 (01-08-24 @ 22:28) (88 - 95)  BP: 95/61 (01-08-24 @ 22:28) (95/60 - 109/69)  RR: 18 (01-08-24 @ 22:28) (18 - 18)  SpO2: 96% (01-08-24 @ 20:08) (95% - 96%)                   Daily     Daily       Bilirubin Total: 0.4 mg/dL (01-08 @ 07:14)    CAPILLARY BLOOD GLUCOSE      POCT Blood Glucose.: 232 mg/dL (09 Jan 2024 02:08)  POCT Blood Glucose.: 176 mg/dL (08 Jan 2024 22:20)  POCT Blood Glucose.: 171 mg/dL (08 Jan 2024 17:04)  POCT Blood Glucose.: 212 mg/dL (08 Jan 2024 13:08)  POCT Blood Glucose.: 141 mg/dL (08 Jan 2024 07:35)                            PHYSICAL EXAM  s"  Neurology: alert and oriented x 3, moves all extremities with no defecits  CV :  RRR  Sternal Wound :  CDI , Stable  Lungs:   CTA B/L  Abdomen: soft, nontender, nondistended, positive bowel sounds, last bowel movement   Extremities:                                        This patient has been assessed with a concern for Malnutrition and has been determined to have a diagnosis/diagnoses of Severe protein-calorie malnutrition.    This patient is being managed with:   Diet NPO-  Entered: Aug 14 2023  4:37PM       VITAL SIGNS    Telemetry:    st  110    Vital Signs Last 24 Hrs  T(C): 36.6 (01-08-24 @ 20:08), Max: 36.6 (01-08-24 @ 20:08)  T(F): 97.8 (01-08-24 @ 20:08), Max: 97.8 (01-08-24 @ 20:08)  HR: 92 (01-08-24 @ 22:28) (88 - 95)  BP: 95/61 (01-08-24 @ 22:28) (95/60 - 109/69)  RR: 18 (01-08-24 @ 22:28) (18 - 18)  SpO2: 96% (01-08-24 @ 20:08) (95% - 96%)                   Daily     Daily       Bilirubin Total: 0.4 mg/dL (01-08 @ 07:14)    CAPILLARY BLOOD GLUCOSE      POCT Blood Glucose.: 232 mg/dL (09 Jan 2024 02:08)  POCT Blood Glucose.: 176 mg/dL (08 Jan 2024 22:20)  POCT Blood Glucose.: 171 mg/dL (08 Jan 2024 17:04)  POCT Blood Glucose.: 212 mg/dL (08 Jan 2024 13:08)  POCT Blood Glucose.: 141 mg/dL (08 Jan 2024 07:35)                            PHYSICAL EXAM  s"  No SOB  No CP"  Neurology: alert and oriented x 3, moves all extremities with no defecits  CV :  RRR  Sternal Wound :  CDI , Stable  Lungs:   CTA B/L  Abdomen: soft, nontender, nondistended, positive bowel sounds, last bowel movement 1/9  Extremities:     no edema

## 2024-01-09 NOTE — PROGRESS NOTE ADULT - ASSESSMENT
59 yo male h/o htn, cad s/p pci, ICH, here with NSTEMI  s/p intubation and cath. now in CCU    NSTEMI  s/p  cath  cath results noted. multi vessel dz., CTSx f/u.    pt with vtach/fib arrest again on 11/8. s/p ACLS and ROSC.   in ccu with iabp   plan for transplant as per HF and transplant team  transplant w/u ongoing.   s/p colonoscopy 11/17. normal  now listed for transplant as of 11/22 12/25: pt s/p heart transplant last night. remains intubated with iabp in CTU.   12/26: pt extubated to high flow this am. IABP currently being removed. pt a0x3.   12/27: pt feels well. walked with PT this am. currently comforable sitting in chair. IABP removed. wean off pressors as able.   12/28-29: no acute events o/n. weaning down ionotropes/pressors as able. immunosuppressives as per transplant team. PT  12/30: feels well. pressors/ionotropes weaned off. removal of chest tubes as able. immunosupressives as per id. PT.  12/31: feels well. tx out of ctu to step down unit. cont care as per ctsx team. PT  1/1: no acute events o/n. to transition off insulin gtt today. PT. d/w family bedside  1/2: no acute events o/n. plan on removal of a line and chest tubes today as per ctsx. dm mngt as per endo  1/3: feels well. s/p cardiac biopsy yesterday. worked with PT today. one CT remaining.   1/4: no acute events o/n. FS improved. mngt of CT as per CTSx   1/5: doing well. no c/o. leukocytosis noted. steroids likely contributing.  CT noted. pt reports +BM today  1/6: no acute events. +BM today. cont care as per CTSx team. eager to go home   1/7: no acute events o/n. CT with sterocal colitis. gi consulted. bowel regimen as per gi.  1/8: feels well. +large bm today. gi f/u noted. wbc improved. transplant f/u  1/9: s/p cardiac biopsy this am. feels well. transplant cards f/u. possible dc tomorrow      mngt as per CTSx team          Advanced care planning was discussed with patient and family.  Advanced care planning forms were reviewed and discussed as appropriate.  Differential diagnosis and plan of care discussed with patient after the evaluation.   Pain assessed and judicious use of narcotics when appropriate was discussed.  Importance of Fall prevention discussed.  Counseling on Smoking and Alcohol cessation was offered when appropriate.  Counseling on Diet, exercise, and medication compliance was done.       Approx 75 minutes spent. 57 yo male h/o htn, cad s/p pci, ICH, here with NSTEMI  s/p intubation and cath. now in CCU    NSTEMI  s/p  cath  cath results noted. multi vessel dz., CTSx f/u.    pt with vtach/fib arrest again on 11/8. s/p ACLS and ROSC.   in ccu with iabp   plan for transplant as per HF and transplant team  transplant w/u ongoing.   s/p colonoscopy 11/17. normal  now listed for transplant as of 11/22 12/25: pt s/p heart transplant last night. remains intubated with iabp in CTU.   12/26: pt extubated to high flow this am. IABP currently being removed. pt a0x3.   12/27: pt feels well. walked with PT this am. currently comforable sitting in chair. IABP removed. wean off pressors as able.   12/28-29: no acute events o/n. weaning down ionotropes/pressors as able. immunosuppressives as per transplant team. PT  12/30: feels well. pressors/ionotropes weaned off. removal of chest tubes as able. immunosupressives as per id. PT.  12/31: feels well. tx out of ctu to step down unit. cont care as per ctsx team. PT  1/1: no acute events o/n. to transition off insulin gtt today. PT. d/w family bedside  1/2: no acute events o/n. plan on removal of a line and chest tubes today as per ctsx. dm mngt as per endo  1/3: feels well. s/p cardiac biopsy yesterday. worked with PT today. one CT remaining.   1/4: no acute events o/n. FS improved. mngt of CT as per CTSx   1/5: doing well. no c/o. leukocytosis noted. steroids likely contributing.  CT noted. pt reports +BM today  1/6: no acute events. +BM today. cont care as per CTSx team. eager to go home   1/7: no acute events o/n. CT with sterocal colitis. gi consulted. bowel regimen as per gi.  1/8: feels well. +large bm today. gi f/u noted. wbc improved. transplant f/u  1/9: s/p cardiac biopsy this am. feels well. transplant cards f/u. possible dc tomorrow      mngt as per CTSx team          Advanced care planning was discussed with patient and family.  Advanced care planning forms were reviewed and discussed as appropriate.  Differential diagnosis and plan of care discussed with patient after the evaluation.   Pain assessed and judicious use of narcotics when appropriate was discussed.  Importance of Fall prevention discussed.  Counseling on Smoking and Alcohol cessation was offered when appropriate.  Counseling on Diet, exercise, and medication compliance was done.       Approx 75 minutes spent.

## 2024-01-09 NOTE — PROGRESS NOTE ADULT - REASON FOR ADMISSION
CP
CP
CP/SOB
Cardiogenic shock
Cardiogenic shock
NSTEMI
SP  HEART TRANSPLANT
advanced cardiac support
advanced cardiac work up for CHF
advanced therapies evaluation
cardiogenic shock
pre advanced cardiac therapies
pre advanced cardiac therapies evaluation
pre advanced therapies evaluation
CP and SOB
CP/SOB
CP/SOB
Cardiogenic shock
advanced cardiac support work up
cardiogenic shock
pre heart transplant evaluation
ventricular fibrillation
CHF pre advanced therapies work up
CP/SOB
Cardiogenic shock
Heart Failure
advanced cardiac therapies work up
advanced cardiac therapies work up
cardiogenic shock
cardiogenic shock
heart failure
s/p OHTx
s/p heart transplant
CP
CP
Cardiogenic shock
Cardiogenic shock
Chest pain
Mix Cardiogenic Septic shock
cardiogenic shock
pre advanced cardiac therapies evaluation
CP
Cardiac Arrest
Cardiogenic shock
CP
CP/SOB
CP/SOB
Cardiogenic shock
SOB
advanced cardiac support
Cardiogenic shock
Cardiogenic shock
advanced cardiac therapies evaluation
heart transplant
pre heart transplant work up/CHF
CP
CP/SOB
CHF
CHF
cardiogenic shock
heart transplant
Cardiogenic shock
pre heart transplant work up/CHF
Cardiogenic shock
cardiogenic shock
CHF
Cardiogenic shock
sob
ADHF, NSTEMI
NSTEMI, CHF
cardiogenic shock
NSTEMI, ADHF
cardiogenic shock
NSTEMI, ADHF
NSTEMI, CHF
sob
sob

## 2024-01-09 NOTE — PROGRESS NOTE ADULT - ASSESSMENT
57 yo M with HFrEF (LVIDd 6.4 cm, LVEF 32%), CAD s/p PCI (2008), HTN, DMT2 (A1c 8.3%) and CVA s/p TPA (2018), recently treated for PNA who initially presented to [facility] via EMS after syncope reportedly requiring defibrillation. Treated for ACS but left AMA to come to [facility]. On arrival ECG with ST depression in lateral leads. Intubated 11/1 for respiratory failure and was found to have COVID. L/RHC 11/1revealing  of LAD and RCA, elevated filling pressures and CI of 1.5 prompting placement of IABP. Extubated 11/3. IABP was weaned to off 11/7, however the following day developed PMVT cardiac arrest with prompt CPR and defibrillation, started on IV Lidocaine/Amio and IABP ultimately replaced on 11/9. During this admission was found to be bacteremic for which she was treated for Staph epi, Enterococcus faecalis, Cutibacterium with IV abx.  Was listed Status 2, ABO A, PRA 0% (12/12).     A suitable donor was identified and on 12/25, he underwent OHT (ischemic Time: 253min, CMV -/+, Toxo -/-). The following day, extubated and IABP removed. Overall progressing well. He underwent RHC/EMBx with revealed elevated filling pressures and normal cardiac output. His diuretics have been increased and he is responding well. He continues to have elevated WBC however remains afebrile and off antibiotics. Infectious workup has been negative today however on CT scan was noted to have significant constipation.  Next RHC/EMBx schedule tomorrow 1/9 and hopeful for discharge home later that day 57 yo M with HFrEF (LVIDd 6.4 cm, LVEF 32%), CAD s/p PCI (2008), HTN, DMT2 (A1c 8.3%) and CVA s/p TPA (2018), recently treated for PNA who initially presented to [facility] via EMS after syncope reportedly requiring defibrillation. Treated for ACS but left AMA to come to [facility]. On arrival ECG with ST depression in lateral leads. Intubated 11/1 for respiratory failure and was found to have COVID. L/RHC 11/1revealing  of LAD and RCA, elevated filling pressures and CI of 1.5 prompting placement of IABP. Extubated 11/3. IABP was weaned to off 11/7, however the following day developed PMVT cardiac arrest with prompt CPR and defibrillation, started on IV Lidocaine/Amio and IABP ultimately replaced on 11/9. During this admission was found to be bacteremic for which she was treated for Staph epi, Enterococcus faecalis, Cutibacterium with IV abx.  Was listed Status 2, ABO A, PRA 0% (12/12).     A suitable donor was identified and on 12/25, he underwent OHT (ischemic Time: 253min, CMV -/+, Toxo -/-). The following day, extubated and IABP removed. Overall progressing well. He underwent RHC/EMBx with revealed elevated filling pressures and normal cardiac output. His diuretics have been increased and he is responding well. He continues to have elevated WBC however remains afebrile and off antibiotics. Infectious workup has been negative today however on CT scan was noted to have significant constipation.  Next RHC/EMBx schedule tomorrow 1/9 and hopeful for discharge home later that day 57 yo M with HFrEF (LVIDd 6.4 cm, LVEF 32%), CAD s/p PCI (2008), HTN, DMT2 (A1c 8.3%) and CVA s/p TPA (2018), recently treated for PNA who initially presented to [facility] via EMS after syncope reportedly requiring defibrillation. Treated for ACS but left AMA to come to [facility]. On arrival ECG with ST depression in lateral leads. Intubated 11/1 for respiratory failure and was found to have COVID. L/RHC 11/1revealing  of LAD and RCA, elevated filling pressures and CI of 1.5 prompting placement of IABP. Extubated 11/3. IABP was weaned to off 11/7, however the following day developed PMVT cardiac arrest with prompt CPR and defibrillation, started on IV Lidocaine/Amio and IABP ultimately replaced on 11/9. During this admission was found to be bacteremic for which she was treated for Staph epi, Enterococcus faecalis, Cutibacterium with IV abx.  Was listed Status 2, ABO A, PRA 0% (12/12).     A suitable donor was identified and on 12/25, he underwent OHT (ischemic Time: 253min, CMV -/+, Toxo -/-). The following day, extubated and IABP removed. His first RHC/EMBx with revealed elevated filling pressures and normal cardiac output. His diuretics were adjusted and he is responding well. His course was complicated by leukocytosis however infectious workup was negative. CT scan only revealed significant constipation and now having regular bowel movement. During this admission he was noted to be hypergylecmic for which Endo is closely following and adjusting his lantus. He underwent his second biopsy which revealed normal filling pressures and will plan for discharge home today.     1/9 RHC/EMBx: RA 5, RV 19/6/8, PA 21/13/17, PCWP 15, Ao sat 97, PA sat 68.9  59 yo M with HFrEF (LVIDd 6.4 cm, LVEF 32%), CAD s/p PCI (2008), HTN, DMT2 (A1c 8.3%) and CVA s/p TPA (2018), recently treated for PNA who initially presented to [facility] via EMS after syncope reportedly requiring defibrillation. Treated for ACS but left AMA to come to [facility]. On arrival ECG with ST depression in lateral leads. Intubated 11/1 for respiratory failure and was found to have COVID. L/RHC 11/1revealing  of LAD and RCA, elevated filling pressures and CI of 1.5 prompting placement of IABP. Extubated 11/3. IABP was weaned to off 11/7, however the following day developed PMVT cardiac arrest with prompt CPR and defibrillation, started on IV Lidocaine/Amio and IABP ultimately replaced on 11/9. During this admission was found to be bacteremic for which she was treated for Staph epi, Enterococcus faecalis, Cutibacterium with IV abx.  Was listed Status 2, ABO A, PRA 0% (12/12).     A suitable donor was identified and on 12/25, he underwent OHT (ischemic Time: 253min, CMV -/+, Toxo -/-). The following day, extubated and IABP removed. His first RHC/EMBx with revealed elevated filling pressures and normal cardiac output. His diuretics were adjusted and he is responding well. His course was complicated by leukocytosis however infectious workup was negative. CT scan only revealed significant constipation and now having regular bowel movement. During this admission he was noted to be hypergylecmic for which Endo is closely following and adjusting his lantus. He underwent his second biopsy which revealed normal filling pressures and will plan for discharge home today.     1/9 RHC/EMBx: RA 5, RV 19/6/8, PA 21/13/17, PCWP 15, Ao sat 97, PA sat 68.9  59 yo M with HFrEF (LVIDd 6.4 cm, LVEF 32%), CAD s/p PCI (2008), HTN, DMT2 (A1c 8.3%) and CVA s/p TPA (2018), recently treated for PNA who initially presented to [facility] via EMS after syncope reportedly requiring defibrillation. Treated for ACS but left AMA to come to [facility]. On arrival ECG with ST depression in lateral leads. Intubated 11/1 for respiratory failure and was found to have COVID. L/RHC 11/1revealing  of LAD and RCA, elevated filling pressures and CI of 1.5 prompting placement of IABP. Extubated 11/3. IABP was weaned to off 11/7, however the following day developed PMVT cardiac arrest with prompt CPR and defibrillation, started on IV Lidocaine/Amio and IABP ultimately replaced on 11/9. During this admission was found to be bacteremic for which she was treated for Staph epi, Enterococcus faecalis, Cutibacterium with IV abx.  Was listed Status 2, ABO A, PRA 0% (12/12).     A suitable donor was identified and on 12/25, he underwent OHT (ischemic Time: 253min, CMV -/+, Toxo -/-). The following day, extubated and IABP removed. His first RHC/EMBx with revealed elevated filling pressures and normal cardiac output. His diuretics were adjusted and he is responding well. His course was complicated by leukocytosis however infectious workup was negative. CT scan only revealed significant constipation and now having regular bowel movement. During this admission he was noted to be hyperglycemic for which Endo is closely following and adjusting his lantus. He underwent his second biopsy which revealed normal filling pressures and will plan for discharge home today.     1/9 RHC/EMBx: RA 5, RV 19/6/8, PA 21/13/17, PCWP 15, Ao sat 97, PA sat 68.9  57 yo M with HFrEF (LVIDd 6.4 cm, LVEF 32%), CAD s/p PCI (2008), HTN, DMT2 (A1c 8.3%) and CVA s/p TPA (2018), recently treated for PNA who initially presented to [facility] via EMS after syncope reportedly requiring defibrillation. Treated for ACS but left AMA to come to [facility]. On arrival ECG with ST depression in lateral leads. Intubated 11/1 for respiratory failure and was found to have COVID. L/RHC 11/1revealing  of LAD and RCA, elevated filling pressures and CI of 1.5 prompting placement of IABP. Extubated 11/3. IABP was weaned to off 11/7, however the following day developed PMVT cardiac arrest with prompt CPR and defibrillation, started on IV Lidocaine/Amio and IABP ultimately replaced on 11/9. During this admission was found to be bacteremic for which she was treated for Staph epi, Enterococcus faecalis, Cutibacterium with IV abx.  Was listed Status 2, ABO A, PRA 0% (12/12).     A suitable donor was identified and on 12/25, he underwent OHT (ischemic Time: 253min, CMV -/+, Toxo -/-). The following day, extubated and IABP removed. His first RHC/EMBx with revealed elevated filling pressures and normal cardiac output. His diuretics were adjusted and he is responding well. His course was complicated by leukocytosis however infectious workup was negative. CT scan only revealed significant constipation and now having regular bowel movement. During this admission he was noted to be hyperglycemic for which Endo is closely following and adjusting his lantus. He underwent his second biopsy which revealed normal filling pressures and will plan for discharge home today.     1/9 RHC/EMBx: RA 5, RV 19/6/8, PA 21/13/17, PCWP 15, Ao sat 97, PA sat 68.9

## 2024-01-09 NOTE — DISCHARGE NOTE PROVIDER - NSDCMRMEDTOKEN_GEN_ALL_CORE_FT
Albuterol (Eqv-ProAir HFA) 90 mcg/inh inhalation aerosol: 2 puff(s) inhaled every 6 hours as needed  carvedilol 25 mg oral tablet: 1 tab(s) orally 2 times a day  clopidogrel 75 mg oral tablet: 1 tab(s) orally once a day  dapagliflozin 10 mg oral tablet: 1 tab(s) orally once a day (in the morning)  hydroCHLOROthiazide 25 mg oral tablet: 1 tab(s) orally once a day (in the morning)  ipratropium-albuterol 0.5 mg-2.5 mg/3 mL inhalation solution: 3 milliliter(s) by nebulizer once a day  levoFLOXacin 500 mg oral tablet: 1 tab(s) orally once a day for 10 days  losartan 25 mg oral tablet: 1 tab(s) orally once a day  montelukast 10 mg oral tablet: 1 tab(s) orally once a day  Ozempic 4 mg/3 mL (1 mg dose) subcutaneous solution: 1 milligram(s) subcutaneously once a week  Trelegy Ellipta 100 mcg-62.5 mcg-25 mcg/inh inhalation powder: 2 puff(s) inhaled 2 times a day NOTE: As per Pharmacy, last filled 10/22   budesonide-formoterol 80 mcg-4.5 mcg/inh inhalation aerosol: 1 puff(s) inhaled 2 times a day  fluconazole 100 mg oral tablet: 1 tab(s) orally once a day  HumaLOG KwikPen 100 units/mL injectable solution: 20 unit(s) injectable once a day before breakfast; 17 units prior to lunch; 15 units prior to dinner  hydrALAZINE 25 mg oral tablet: 1 tab(s) orally every 8 hours  insulin glargine 100 units/mL subcutaneous solution: 30 unit(s) subcutaneous once a day (at bedtime)  magnesium oxide 400 mg oral tablet: 1 tab(s) orally 3 times a day (with meals)  melatonin 5 mg oral tablet: 1 tab(s) orally once a day (at bedtime)  mycophenolate mofetil 250 mg oral capsule: 1,000 milligram(s) orally 2 times a day  NIFEdipine 60 mg oral tablet, extended release: 1 tab(s) orally every 12 hours  OLANZapine 5 mg oral tablet: 1 tab(s) orally once a day (at bedtime)  pantoprazole 40 mg oral delayed release tablet: 1 tab(s) orally every 24 hours  predniSONE 5 mg oral tablet: 12.5 milligram(s) orally every 12 hours  sulfamethoxazole-trimethoprim 400 mg-80 mg oral tablet: 1 tab(s) orally once a day  tacrolimus 1 mg oral capsule: 6 milligram(s) orally 2 times a day  valGANciclovir 450 mg oral tablet: 1 tab(s) orally every 12 hours

## 2024-01-09 NOTE — PROGRESS NOTE ADULT - PROBLEM SELECTOR PLAN 5
- pretransplant was noted to have positive BCx staph epi, Enterococcus faecalis and Cutibacterium   - s/p IABP exchange from RFA to LFA on 11/20.  - appreciated transplant ID recs.  - post transplant has been afebrile however was noted to have bump in WBC  - RVP negative  - Blood cx NGTD  - procalcitonin normal  - CT unrevealing - pretransplant was noted to have positive BCx staph epi, Enterococcus faecalis and Cutibacterium   - s/p IABP exchange from RFA to LFA on 11/20.  - appreciated transplant ID recs.  - post transplant has been afebrile however was noted to have bump in WBC. Now resolved   - RVP negative  - Blood cx NGTD  - procalcitonin normal  - CT unrevealing

## 2024-01-09 NOTE — PROGRESS NOTE ADULT - PROBLEM SELECTOR PROBLEM 2
CAD (coronary artery disease)
Cardiac arrest
Cardiac arrest
Hyponatremia
Hyponatremia
Immunosuppression
Immunosuppression
Hyponatremia
Hyponatremia
Immunosuppression
Immunosuppression
CAD (coronary artery disease)
CAD (coronary artery disease)
Hyponatremia
Cardiac arrest
Cardiac arrest
Immunosuppression
CAD (coronary artery disease)
Cardiac arrest
Hyponatremia
Hyponatremia
Immunosuppression
CAD (coronary artery disease)
Cardiac arrest
Cardiac arrest
Hyponatremia
Hyponatremia
Steroid-induced hyperglycemia
Steroid-induced hyperglycemia
ARIC (acute kidney injury)
Cardiac arrest
Steroid-induced hyperglycemia
ARIC (acute kidney injury)
CAD (coronary artery disease)
Hyponatremia
Immunosuppression
CAD (coronary artery disease)
Hyponatremia
Immunosuppression
ARIC (acute kidney injury)
CAD (coronary artery disease)
Cardiac arrest
Cardiac arrest
Hyponatremia
CAD (coronary artery disease)
CAD (coronary artery disease)
Steroid-induced hyperglycemia
CAD (coronary artery disease)
Cardiac arrest
Steroid-induced hyperglycemia
ARIC (acute kidney injury)
Cardiac arrest
ARIC (acute kidney injury)
CAD (coronary artery disease)
CAD (coronary artery disease)
Cardiac arrest
Cardiac arrest
Steroid-induced hyperglycemia
CAD (coronary artery disease)
ARIC (acute kidney injury)
ARIC (acute kidney injury)
Cardiac arrest
Immunosuppression
Steroid-induced hyperglycemia
ARIC (acute kidney injury)
CAD (coronary artery disease)
CAD (coronary artery disease)
ARIC (acute kidney injury)
CAD (coronary artery disease)
ARIC (acute kidney injury)
Immunosuppression
CAD (coronary artery disease)
Immunosuppression
CAD (coronary artery disease)
Immunosuppression
CAD (coronary artery disease)

## 2024-01-11 ENCOUNTER — NON-APPOINTMENT (OUTPATIENT)
Age: 59
End: 2024-01-11

## 2024-01-11 LAB
ALBUMIN SERPL ELPH-MCNC: 4.7 G/DL
ALP BLD-CCNC: 75 U/L
ALT SERPL-CCNC: 75 U/L
ANION GAP SERPL CALC-SCNC: 13 MMOL/L
AST SERPL-CCNC: 31 U/L
BASOPHILS # BLD AUTO: 0.02 K/UL
BASOPHILS NFR BLD AUTO: 0.1 %
BILIRUB SERPL-MCNC: 0.2 MG/DL
BUN SERPL-MCNC: 39 MG/DL
CALCIUM SERPL-MCNC: 9.9 MG/DL
CHLORIDE SERPL-SCNC: 94 MMOL/L
CO2 SERPL-SCNC: 21 MMOL/L
CREAT SERPL-MCNC: 1.14 MG/DL
EGFR: 74 ML/MIN/1.73M2
EOSINOPHIL # BLD AUTO: 0.12 K/UL
EOSINOPHIL NFR BLD AUTO: 0.8 %
GLUCOSE SERPL-MCNC: 190 MG/DL
HCT VFR BLD CALC: 33.6 %
HGB BLD-MCNC: 11.4 G/DL
IMM GRANULOCYTES NFR BLD AUTO: 0.5 %
LYMPHOCYTES # BLD AUTO: 2.65 K/UL
LYMPHOCYTES NFR BLD AUTO: 18 %
MAGNESIUM SERPL-MCNC: 1.6 MG/DL
MAN DIFF?: NORMAL
MCHC RBC-ENTMCNC: 30.7 PG
MCHC RBC-ENTMCNC: 33.9 GM/DL
MCV RBC AUTO: 90.6 FL
MONOCYTES # BLD AUTO: 0.4 K/UL
MONOCYTES NFR BLD AUTO: 2.7 %
NEUTROPHILS # BLD AUTO: 11.5 K/UL
NEUTROPHILS NFR BLD AUTO: 77.9 %
PLATELET # BLD AUTO: 220 K/UL
POTASSIUM SERPL-SCNC: 5.2 MMOL/L
PROT SERPL-MCNC: 6.4 G/DL
RBC # BLD: 3.71 M/UL
RBC # FLD: 16.3 %
SODIUM SERPL-SCNC: 128 MMOL/L
SURGICAL PATHOLOGY STUDY: SIGNIFICANT CHANGE UP
SURGICAL PATHOLOGY STUDY: SIGNIFICANT CHANGE UP
TACROLIMUS SERPL-MCNC: 10 NG/ML
WBC # FLD AUTO: 14.76 K/UL

## 2024-01-16 ENCOUNTER — RESULT REVIEW (OUTPATIENT)
Age: 59
End: 2024-01-16

## 2024-01-16 ENCOUNTER — OUTPATIENT (OUTPATIENT)
Dept: OUTPATIENT SERVICES | Facility: HOSPITAL | Age: 59
LOS: 1 days | End: 2024-01-16
Payer: COMMERCIAL

## 2024-01-16 ENCOUNTER — APPOINTMENT (OUTPATIENT)
Dept: HEART FAILURE | Facility: CLINIC | Age: 59
End: 2024-01-16
Payer: COMMERCIAL

## 2024-01-16 ENCOUNTER — TRANSCRIPTION ENCOUNTER (OUTPATIENT)
Age: 59
End: 2024-01-16

## 2024-01-16 ENCOUNTER — APPOINTMENT (OUTPATIENT)
Dept: CV DIAGNOSITCS | Facility: HOSPITAL | Age: 59
End: 2024-01-16

## 2024-01-16 VITALS
SYSTOLIC BLOOD PRESSURE: 113 MMHG | DIASTOLIC BLOOD PRESSURE: 75 MMHG | RESPIRATION RATE: 18 BRPM | OXYGEN SATURATION: 99 % | HEART RATE: 98 BPM

## 2024-01-16 VITALS
TEMPERATURE: 97.9 F | BODY MASS INDEX: 27.94 KG/M2 | SYSTOLIC BLOOD PRESSURE: 121 MMHG | DIASTOLIC BLOOD PRESSURE: 87 MMHG | HEART RATE: 103 BPM | RESPIRATION RATE: 18 BRPM | OXYGEN SATURATION: 100 % | WEIGHT: 178 LBS | HEIGHT: 67 IN

## 2024-01-16 VITALS
TEMPERATURE: 98 F | DIASTOLIC BLOOD PRESSURE: 91 MMHG | OXYGEN SATURATION: 100 % | SYSTOLIC BLOOD PRESSURE: 142 MMHG | RESPIRATION RATE: 16 BRPM | WEIGHT: 177.91 LBS | HEART RATE: 100 BPM | HEIGHT: 67 IN

## 2024-01-16 DIAGNOSIS — I25.10 ATHEROSCLEROTIC HEART DISEASE OF NATIVE CORONARY ARTERY W/OUT ANGINA PECTORIS: ICD-10-CM

## 2024-01-16 DIAGNOSIS — Z82.0 FAMILY HISTORY OF EPILEPSY AND OTHER DISEASES OF THE NERVOUS SYSTEM: ICD-10-CM

## 2024-01-16 DIAGNOSIS — Z95.5 PRESENCE OF CORONARY ANGIOPLASTY IMPLANT AND GRAFT: Chronic | ICD-10-CM

## 2024-01-16 DIAGNOSIS — Z86.79 PERSONAL HISTORY OF OTHER DISEASES OF THE CIRCULATORY SYSTEM: ICD-10-CM

## 2024-01-16 DIAGNOSIS — Z82.49 FAMILY HISTORY OF ISCHEMIC HEART DISEASE AND OTHER DISEASES OF THE CIRCULATORY SYSTEM: ICD-10-CM

## 2024-01-16 DIAGNOSIS — I25.2 OLD MYOCARDIAL INFARCTION: ICD-10-CM

## 2024-01-16 DIAGNOSIS — Z94.1 HEART TRANSPLANT STATUS: ICD-10-CM

## 2024-01-16 DIAGNOSIS — Z95.5 PRESENCE OF CORONARY ANGIOPLASTY IMPLANT AND GRAFT: ICD-10-CM

## 2024-01-16 DIAGNOSIS — Z94.1 HEART TRANSPLANT STATUS: Chronic | ICD-10-CM

## 2024-01-16 LAB
ALBUMIN SERPL ELPH-MCNC: 4.4 G/DL — SIGNIFICANT CHANGE UP (ref 3.3–5)
ALP SERPL-CCNC: 97 U/L — SIGNIFICANT CHANGE UP (ref 40–120)
ALT FLD-CCNC: 44 U/L — SIGNIFICANT CHANGE UP (ref 10–45)
ANION GAP SERPL CALC-SCNC: 12 MMOL/L — SIGNIFICANT CHANGE UP (ref 5–17)
AST SERPL-CCNC: 20 U/L — SIGNIFICANT CHANGE UP (ref 10–40)
BASOPHILS # BLD AUTO: 0.01 K/UL — SIGNIFICANT CHANGE UP (ref 0–0.2)
BASOPHILS NFR BLD AUTO: 0.1 % — SIGNIFICANT CHANGE UP (ref 0–2)
BILIRUB SERPL-MCNC: 0.3 MG/DL — SIGNIFICANT CHANGE UP (ref 0.2–1.2)
BUN SERPL-MCNC: 38 MG/DL — HIGH (ref 7–23)
CALCIUM SERPL-MCNC: 10 MG/DL — SIGNIFICANT CHANGE UP (ref 8.4–10.5)
CHLORIDE SERPL-SCNC: 92 MMOL/L — LOW (ref 96–108)
CO2 SERPL-SCNC: 25 MMOL/L — SIGNIFICANT CHANGE UP (ref 22–31)
CREAT SERPL-MCNC: 0.97 MG/DL — SIGNIFICANT CHANGE UP (ref 0.5–1.3)
EGFR: 90 ML/MIN/1.73M2 — SIGNIFICANT CHANGE UP
EOSINOPHIL # BLD AUTO: 0.07 K/UL — SIGNIFICANT CHANGE UP (ref 0–0.5)
EOSINOPHIL NFR BLD AUTO: 0.6 % — SIGNIFICANT CHANGE UP (ref 0–6)
GLUCOSE BLDC GLUCOMTR-MCNC: 226 MG/DL — HIGH (ref 70–99)
GLUCOSE SERPL-MCNC: 223 MG/DL — HIGH (ref 70–99)
HCT VFR BLD CALC: 33.2 % — LOW (ref 39–50)
HGB BLD-MCNC: 11.6 G/DL — LOW (ref 13–17)
IMM GRANULOCYTES NFR BLD AUTO: 0.3 % — SIGNIFICANT CHANGE UP (ref 0–0.9)
LYMPHOCYTES # BLD AUTO: 19 % — SIGNIFICANT CHANGE UP (ref 13–44)
LYMPHOCYTES # BLD AUTO: 2.23 K/UL — SIGNIFICANT CHANGE UP (ref 1–3.3)
MAGNESIUM SERPL-MCNC: 1.7 MG/DL — SIGNIFICANT CHANGE UP (ref 1.6–2.6)
MCHC RBC-ENTMCNC: 31.2 PG — SIGNIFICANT CHANGE UP (ref 27–34)
MCHC RBC-ENTMCNC: 34.9 GM/DL — SIGNIFICANT CHANGE UP (ref 32–36)
MCV RBC AUTO: 89.2 FL — SIGNIFICANT CHANGE UP (ref 80–100)
MONOCYTES # BLD AUTO: 0.41 K/UL — SIGNIFICANT CHANGE UP (ref 0–0.9)
MONOCYTES NFR BLD AUTO: 3.5 % — SIGNIFICANT CHANGE UP (ref 2–14)
NEUTROPHILS # BLD AUTO: 9 K/UL — HIGH (ref 1.8–7.4)
NEUTROPHILS NFR BLD AUTO: 76.5 % — SIGNIFICANT CHANGE UP (ref 43–77)
NRBC # BLD: 0 /100 WBCS — SIGNIFICANT CHANGE UP (ref 0–0)
PLATELET # BLD AUTO: 229 K/UL — SIGNIFICANT CHANGE UP (ref 150–400)
POTASSIUM SERPL-MCNC: 4.6 MMOL/L — SIGNIFICANT CHANGE UP (ref 3.5–5.3)
POTASSIUM SERPL-SCNC: 4.6 MMOL/L — SIGNIFICANT CHANGE UP (ref 3.5–5.3)
PROT SERPL-MCNC: 6.8 G/DL — SIGNIFICANT CHANGE UP (ref 6–8.3)
RBC # BLD: 3.72 M/UL — LOW (ref 4.2–5.8)
RBC # FLD: 17 % — HIGH (ref 10.3–14.5)
SODIUM SERPL-SCNC: 129 MMOL/L — LOW (ref 135–145)
TACROLIMUS SERPL-MCNC: 11.1 NG/ML — SIGNIFICANT CHANGE UP
WBC # BLD: 11.75 K/UL — HIGH (ref 3.8–10.5)
WBC # FLD AUTO: 11.75 K/UL — HIGH (ref 3.8–10.5)

## 2024-01-16 PROCEDURE — 85025 COMPLETE CBC W/AUTO DIFF WBC: CPT

## 2024-01-16 PROCEDURE — 82803 BLOOD GASES ANY COMBINATION: CPT

## 2024-01-16 PROCEDURE — 82962 GLUCOSE BLOOD TEST: CPT

## 2024-01-16 PROCEDURE — 88346 IMFLUOR 1ST 1ANTB STAIN PX: CPT

## 2024-01-16 PROCEDURE — 93308 TTE F-UP OR LMTD: CPT | Mod: 26

## 2024-01-16 PROCEDURE — 93505 ENDOMYOCARDIAL BIOPSY: CPT | Mod: 26,59

## 2024-01-16 PROCEDURE — 88346 IMFLUOR 1ST 1ANTB STAIN PX: CPT | Mod: 26

## 2024-01-16 PROCEDURE — 93321 DOPPLER ECHO F-UP/LMTD STD: CPT

## 2024-01-16 PROCEDURE — C1769: CPT

## 2024-01-16 PROCEDURE — 93321 DOPPLER ECHO F-UP/LMTD STD: CPT | Mod: 26

## 2024-01-16 PROCEDURE — 93010 ELECTROCARDIOGRAM REPORT: CPT

## 2024-01-16 PROCEDURE — C1894: CPT

## 2024-01-16 PROCEDURE — C1887: CPT

## 2024-01-16 PROCEDURE — 93505 ENDOMYOCARDIAL BIOPSY: CPT

## 2024-01-16 PROCEDURE — 80197 ASSAY OF TACROLIMUS: CPT

## 2024-01-16 PROCEDURE — 36415 COLL VENOUS BLD VENIPUNCTURE: CPT

## 2024-01-16 PROCEDURE — 93308 TTE F-UP OR LMTD: CPT

## 2024-01-16 PROCEDURE — 99152 MOD SED SAME PHYS/QHP 5/>YRS: CPT

## 2024-01-16 PROCEDURE — 80053 COMPREHEN METABOLIC PANEL: CPT

## 2024-01-16 PROCEDURE — 88307 TISSUE EXAM BY PATHOLOGIST: CPT | Mod: 26

## 2024-01-16 PROCEDURE — 93005 ELECTROCARDIOGRAM TRACING: CPT

## 2024-01-16 PROCEDURE — 99215 OFFICE O/P EST HI 40 MIN: CPT

## 2024-01-16 PROCEDURE — 83735 ASSAY OF MAGNESIUM: CPT

## 2024-01-16 PROCEDURE — 88307 TISSUE EXAM BY PATHOLOGIST: CPT

## 2024-01-16 RX ORDER — ROSUVASTATIN CALCIUM 5 MG/1
5 TABLET, FILM COATED ORAL
Qty: 30 | Refills: 5 | Status: DISCONTINUED | COMMUNITY
Start: 2024-01-04 | End: 2024-01-16

## 2024-01-16 RX ORDER — TACROLIMUS 0.5 MG/1
0.5 CAPSULE ORAL
Qty: 60 | Refills: 5 | Status: DISCONTINUED | COMMUNITY
Start: 2024-01-04 | End: 2024-01-16

## 2024-01-16 RX ORDER — BUMETANIDE 1 MG/1
1 TABLET ORAL DAILY
Qty: 30 | Refills: 5 | Status: DISCONTINUED | COMMUNITY
Start: 2024-01-04 | End: 2024-01-16

## 2024-01-16 NOTE — H&P CARDIOLOGY - HISTORY OF PRESENT ILLNESS
58 year old male with HFrEF, CAD s/p PCI 2008, HTN, DMT2 (A1c 8.3), CVA s/p TPA 2018, PNA, who initially presented to [facility] in acute decompensated HF. IABP placed and subsequently weaned off 11/7. Next day developed VT arrest w prompt CPR  and defibrillation. Treated w Lido/Amio and IABP replaced 11/9.  Found to be bacteremic and treated for staph epi, enterococcus faecalis, cutibacterium w IV abx. Underwent OHT12/25/2023. Here today for 3 rd biopsy. Feeling well 58 year old male with HFrEF, CAD s/p PCI 2008, HTN, DMT2 (A1c 8.3), CVA s/p TPA 2018, PNA, who initially presented to [facility] in acute decompensated HF. IABP placed and subsequently weaned off 11/7. Next day developed VT arrest w prompt CPR  and defibrillation. Treated w Lido/Amio and IABP replaced 11/9.  Found to be bacteremic and treated for staph epi, enterococcus faecalis, cutibacterium w IV abx. Underwent OHT12/25/2023. Here today for 3 rd biopsy. Feeling well 58 year old male with HFrEF, CAD s/p MI and stent LAD 2008, HTN, DMT2 (A1c 8.3), CVA s/p TPA 2018, PNA, who initially presented to [facility] in acute decompensated HF 10/2023. IABP placed and subsequently weaned off 11/7. Next day developed VT arrest w prompt CPR  and defibrillation. Treated w Lido/Amio and IABP replaced on 11/9.  Found to be bacteremic and treated  w IV abx. Underwent OHT on 12/25/2023. Here today for 3 rd biopsy. Feeling well today- no complaints  58 year old male with HFrEF, CAD s/p MI and stent LAD 2008, HTN, DMT2 (A1c 8.3), CVA s/p TPA 2018, PNA, who initially presented to [facility] in acute decompensated HF 10/2023. IABP placed and subsequently weaned off 11/7. Next day developed VT arrest w prompt CPR  and defibrillation. Treated w Lido/Amio and IABP replaced on 11/9.  Found to be bacteremic and treated  w IV abx. Underwent OHT on 12/25/2023. Here today for 3 rd biopsy. Feeling well today- no complaints

## 2024-01-16 NOTE — ASU DISCHARGE PLAN (ADULT/PEDIATRIC) - PROVIDER TOKENS
PROVIDER:[TOKEN:[[id number]],FOLLOWUP:[2 weeks]] PROVIDER:[TOKEN:[[id number]],FOLLOWUP:[2 weeks]]

## 2024-01-16 NOTE — H&P CARDIOLOGY - EKG AND INTERPRETATION
ST elevation 0.5mm 1, 11, 111, aVF  Early repolarization V2-V6  ST depression 0.5 mm aVR  Pain free (EKG reviewed w dr young pre proc

## 2024-01-16 NOTE — ASU DISCHARGE PLAN (ADULT/PEDIATRIC) - NS MD DC FALL RISK RISK
For information on Fall & Injury Prevention, visit: [email or web address] OR  [email or web address] OR  https://www.cdc.gov/steadi/patient.html For information on Fall & Injury Prevention, visit: [email or web address] OR  [email or web address] OR  https://www.cdc.gov/steadi/patient.html

## 2024-01-16 NOTE — ASU DISCHARGE PLAN (ADULT/PEDIATRIC) - ASU DC SPECIAL INSTRUCTIONSFT
Wound Care:   the day AFTER your procedure remove bandage GENTLY, and clean using  mild soap and warm water stream, pat dry. Leave the area OPEN to air. YOU MAY SHOWER 24 hour after procedure.   DO NOT apply lotions, creams, ointments, powder, perfumes to the puncture site.  DO NOT SOAK the site for 1 week (no tub bath, no swimming, etc.)  Check  your groin and /or wrist daily. A small amount of bruising and sourness are normal.     ACTIVITY: for 24 hours   - DO NOT DRIVE  - DO NOT make any important decisions or sign legal documents   - DO NOT operate heavy duty machineries   - you may resume sexual activity in 48 hours, unless otherwise instructed by your cardiologist     If your procedure was done through the WRIST: for the NEXT 3DAYS:  - avoid pushing, pulling, with that affected wrist   - avoid repeated movement of that hand and wrist (eg: typing, hammering)  - DO NOT LIFT anything more than 5 lbs     If your procedure was done through the GROIN: for the NEXT 5 DAYS  - Limit climbing stairs, DO NOT soak in bathtub or pool  - no strenuous activities, pushing, pulling, straining  - Do not lift anything 10lbs or heavier     MEDICATION:   Take your medications as explained (see discharge paperwork)   If you received a STENT, you will be taking antiplatelet medications to KEEP YOUR STENT OPEN (eg: Aspirin, Plavix, Brilinta, Effient, etc).  Take as prescribed, DO NOT STOP taking them without consulting with your cardiologist first.     Follow heart healthy diet recommended by your doctor, if you smoke STOP SMOKING (may call [phone] for center of tobacco control if you need assistance)     CALL your doctor to make appointment in 2 WEEKS     ***CALL YOUR DOCTOR***  if you experience: fever, chills, body aches, or severe pain, swelling, redness, heat or yellow discharge at incision site  If you experience Bleeding or excruciating pain at the procedural site, swelling (golf ball size) at your procedural site  If you experience CHEST PAIN  If you experience extremity numbness, tingling, temperature change (of your procedural site)   If you are unable to reach your doctor, you may contact:   -Cardiology Office at [facility] at [phone] or [facility] [phone]- [facility] [phone] Wound Care:   the day AFTER your procedure remove bandage GENTLY, and clean using  mild soap and warm water stream, pat dry. Leave the area OPEN to air. YOU MAY SHOWER 24 hour after procedure.   DO NOT apply lotions, creams, ointments, powder, perfumes to the puncture site.  DO NOT SOAK the site for 1 week (no tub bath, no swimming, etc.)  Check  your groin and /or wrist daily. A small amount of bruising and sourness are normal.     ACTIVITY: for 24 hours   - DO NOT DRIVE  - DO NOT make any important decisions or sign legal documents   - DO NOT operate heavy duty machineries   - you may resume sexual activity in 48 hours, unless otherwise instructed by your cardiologist     If your procedure was done through the WRIST: for the NEXT 3DAYS:  - avoid pushing, pulling, with that affected wrist   - avoid repeated movement of that hand and wrist (eg: typing, hammering)  - DO NOT LIFT anything more than 5 lbs     If your procedure was done through the GROIN: for the NEXT 5 DAYS  - Limit climbing stairs, DO NOT soak in bathtub or pool  - no strenuous activities, pushing, pulling, straining  - Do not lift anything 10lbs or heavier     MEDICATION:   Take your medications as explained (see discharge paperwork)   If you received a STENT, you will be taking antiplatelet medications to KEEP YOUR STENT OPEN (eg: Aspirin, Plavix, Brilinta, Effient, etc).  Take as prescribed, DO NOT STOP taking them without consulting with your cardiologist first.     Follow heart healthy diet recommended by your doctor, if you smoke STOP SMOKING (may call [phone] for center of tobacco control if you need assistance)     CALL your doctor to make appointment in 2 WEEKS     ***CALL YOUR DOCTOR***  if you experience: fever, chills, body aches, or severe pain, swelling, redness, heat or yellow discharge at incision site  If you experience Bleeding or excruciating pain at the procedural site, swelling (golf ball size) at your procedural site  If you experience CHEST PAIN  If you experience extremity numbness, tingling, temperature change (of your procedural site)   If you are unable to reach your doctor, you may contact:   -Cardiology Office at [facility] at [phone] or [facility] [phone]- [facility] [phone]

## 2024-01-16 NOTE — ASU PREOP CHECKLIST - ISOLATION PRECAUTIONS
none Saba Modi MD: Pt was sign out from Dr. Montenegro. [facility] cancel transport do to pt's BP in 220's systolic. Pt given home BP meds will reassess BP in 30 mins Saba Modi MD: Pt's BP still In 200's systolic will give labetalol 5mg an reassess BP

## 2024-01-16 NOTE — ASU DISCHARGE PLAN (ADULT/PEDIATRIC) - CARE PROVIDER_API CALL
Brody Espana  Cardiovascular Disease  [address] [place] [address]  Phone: [phone]  Fax: [phone]  Follow Up Time: 2 weeks   Brody Espana  Cardiovascular Disease  [address] [place] [id number]  Phone: [phone]  Fax: [phone]  Follow Up Time: 2 weeks

## 2024-01-16 NOTE — ASU PATIENT PROFILE, ADULT - FALL HARM RISK - UNIVERSAL INTERVENTIONS
Bed in lowest position, wheels locked, appropriate side rails in place/Call bell, personal items and telephone in reach/Instruct patient to call for assistance before getting out of bed or chair/Non-slip footwear when patient is out of bed/[place] to call system/Physically safe environment - no spills, clutter or unnecessary equipment/Purposeful Proactive Rounding/Room/bathroom lighting operational, light cord in reach Bed in lowest position, wheels locked, appropriate side rails in place/Call bell, personal items and telephone in reach/Instruct patient to call for assistance before getting out of bed or chair/Non-slip footwear when patient is out of bed/[place] to call system/Physically safe environment - no spills, clutter or unnecessary equipment/Purposeful Proactive Rounding/Room/bathroom lighting operational, light cord in reach

## 2024-01-17 ENCOUNTER — APPOINTMENT (OUTPATIENT)
Dept: ENDOCRINOLOGY | Facility: CLINIC | Age: 59
End: 2024-01-17

## 2024-01-17 ENCOUNTER — NON-APPOINTMENT (OUTPATIENT)
Age: 59
End: 2024-01-17

## 2024-01-17 LAB
CMV DNA CSF QL NAA+PROBE: SIGNIFICANT CHANGE UP IU/ML
CMV DNA SPEC NAA+PROBE-LOG#: SIGNIFICANT CHANGE UP LOG10IU/ML
SURGICAL PATHOLOGY STUDY: SIGNIFICANT CHANGE UP

## 2024-01-17 RX ORDER — PREDNISONE 5 MG/1
5 TABLET ORAL
Qty: 30 | Refills: 5 | Status: DISCONTINUED | COMMUNITY
Start: 2024-01-04 | End: 2024-01-17

## 2024-01-19 LAB
HGB FLD-MCNC: 10.3 G/DL — LOW (ref 12.6–17.4)
HGB FLD-MCNC: 10.5 G/DL — LOW (ref 12.6–17.4)
HGB FLD-MCNC: 10.7 G/DL — LOW (ref 12.6–17.4)
OXYHGB MFR BLDMV: 62.2 % — LOW (ref 90–95)
OXYHGB MFR BLDMV: 62.8 % — LOW (ref 90–95)
OXYHGB MFR BLDMV: 66.3 % — LOW (ref 90–95)
SAO2 % BLD: 63.2 % — SIGNIFICANT CHANGE UP (ref 60–90)
SAO2 % BLD: 63.6 % — SIGNIFICANT CHANGE UP (ref 60–90)
SAO2 % BLD: 67.4 % — SIGNIFICANT CHANGE UP (ref 60–90)

## 2024-01-22 NOTE — REVIEW OF SYSTEMS
[FreeTextEntry2] : 14 point ROS done and found to be negative or noncontributory other than noted in HPI

## 2024-01-22 NOTE — END OF VISIT
[FreeTextEntry3] : I was physically present for the key portions of the evaluation and management (E/M) service provided.  I agree with the above history, physical, and plan which I have reviewed and edited where appropriate. [Time Spent: ___ minutes] : I have spent [unfilled] minutes of time on the encounter.

## 2024-01-22 NOTE — DISCUSSION/SUMMARY
[___ Week(s)] : in [unfilled] week(s) [FreeTextEntry1] : # s/p Heart transplant 12/25/23 - f/u RHC/EMBx today - currently off diuretics  - last TTE demonstrated normal findings - start ASA 81mg po daily now  - hold statin until LFT's normalize  #Immunosuppression.  - s/p Simulect on POD 12/25 and received second dose on POD 4 (12/29). - Continue with prednisone taper based on protocol  - Continue with Cellcept 1g PO BID - Will continue to adjust tacro as needed for goal 12-14. c/w fluconazole 100mg daily in order to help boost tacro levels  # Prophylactic antibiotic  - CMV -/+: intermediate risk.  Continue Valcyte 450 mg PO BID  - Toxo -/-: none required - PJP ppx: continue Bactrim S/S PO QD - Trush ppx: fluconazole as stated above  - donor HCV TIM - but HCV antibody +. Check 1 month [facility] labs (due 1/25/24), then 3 mo, 12 mo  # ARIC (acute kidney injury) - pre transplant baseline Cr 1.2, peaked to 4 - monitor closely, avoid nephrotoxic agents - no need for diuretics based on today's RHC  #elevated LFTs - continue to monitor - holding statin  #HTN - continue nifedipine 60mg bid - continue hydralazine 25mg TID  #Constipation - CT abd/pelvis showed stercoral colitis 1/5 - S/p MgCO3, lactulose 20 QID +/- golytely  - GI consulted and signed off  - Can also use bisacodyl suppository if pt amenable  - Encourage ambulation  # Hyperglycemia, steroid induced.  - Endo following, has appt tmrw 1/17 - discharged home on lantus 30 units and ISS; increase lantus to 34 units qhs now - recommended to continue following diabetic diet  #health maintenance - continue MVI - continue calcium citrate - continue PPI - will refer to cardiac rehab at 3 months post tx, encouraged home walking program   Time spent with patient 35 minutes Findings, assessment and plan reviewed with Dr Philip

## 2024-01-22 NOTE — CARDIOLOGY SUMMARY
[de-identified] : 1/16/24: ST 103bpm MAGDA, right axis [de-identified] : 1/9: normal biventricular function 1/2: normal biventricular function [de-identified] : 1/16/24: RHC/EMBx RA 4 PAP 26/10/18 PCW 13 PaSat 63% Juan CO/CI 6/3.1, EMBx ISHLT grade 0; AMR 0 1/9/24: RHC/EMBx: RA 5, RV 19/6/8, PA 21/13/17, PCWP 15, PA sat 68.9, EMBx ISHLT Grade 0R 1/2/24: RHC/EMBx: RA 4 PAP 20/12/15 PCW 15 PaSat 66% Juan CO/CI EMBx ISHLT Grade 0R [de-identified] : CT chest abd/pelvis 1/5/24: IMPRESSION: Question mild stercoral colitis. Expected postoperative changes in the chest. No fluid collection.  DSAs: 1/9: 0%  1/2: 0%  Heartcare: not collected yet

## 2024-01-22 NOTE — HISTORY OF PRESENT ILLNESS
[FreeTextEntry1] : 57 y/o M with h/o HFrEF, CAD s/p PCI (2008), HTN, DMT2 (A1c 8.3%) and CVA s/p TPA (2018), who is now s/p OHT 12/25/23 (ischemic time: 253min, CMV -/+, Toxo -/-) and presents for routine follow-up/biopsy.   Pt initially presented to [facility] via EMS after syncope reportedly requiring defibrillation, left AMA to come to [facility] on 11/1/23 where he was found to have Covid and intubated for respiratory failure. Then underwent L/RHC on 11/1 revealing  of LAD and RCA, elevated filling pressures and CI of 1.5 which prompted placement of IABP which was subsequently removed c/b PMVT cardiac arrest requiring replacement of IABP on 11/9. During hospitalization, treated for Staph epi, Enterococcus faecalis, Cutibacterium with IV abx.  Pt was evaluated for heart transplant, listed Status 2, ABO A, PRA 0% and on 12/25 a suitable donor was identified and he underwent OHT. His first RHC/EMBx revealed elevated filling pressures and diuretics were adjusted. His course was complicated by leukocytosis, however infectious workup was negative and CT scan revealed significant constipation requiring disimpaction with improvement.  Pt was persistently hyperglycemic, endocrine adjusted insulin regimen. He underwent his second biopsy which revealed normal filling pressures and ISHLT Grade 0R, and was discharged home 1/9/23.  Pt arrives today for first outpatient visit and biopsy. He is accompanied by his daughter. He reports doing well and each day feeling stronger. Home VS log revealed normotensive at home, weight 178-181lbs, -385 mg/dL (pt attempting to eat diabetic diet), taking insulin as prescribed and has f/u with endocrine tomorrow. He reports moving bowels daily but continues to have to strain and taking milk of magnesium to assist. Denied blood in stool. Overall denied SOB, CP, palpitations, N/V/D, fever, chills or LE edema. His daughter is assisting with pillbox and pt reports 100% compliance.

## 2024-01-22 NOTE — PHYSICAL EXAM
[No Edema] : no edema [Normal] : no rash, no skin lesions [No Rash] : no rash [Moves all extremities] : moves all extremities [Alert and Oriented] : alert and oriented [de-identified] : +midsternal chest healing

## 2024-01-22 NOTE — ASSESSMENT
[FreeTextEntry1] : 59 y/o M with PMHx HFrEF, CAD s/p PCI, HTN, DMT2 and CVA s/p TPA who is now s/p OHT 12/25/23, clinically progressing well and here today for routine follow up care. Doing well post-operatively but has hyperglycemia despite adhering to insulin regimen.

## 2024-01-23 ENCOUNTER — APPOINTMENT (OUTPATIENT)
Dept: CV DIAGNOSITCS | Facility: HOSPITAL | Age: 59
End: 2024-01-23

## 2024-01-23 ENCOUNTER — NON-APPOINTMENT (OUTPATIENT)
Age: 59
End: 2024-01-23

## 2024-01-23 ENCOUNTER — APPOINTMENT (OUTPATIENT)
Dept: ENDOCRINOLOGY | Facility: CLINIC | Age: 59
End: 2024-01-23
Payer: COMMERCIAL

## 2024-01-23 ENCOUNTER — RESULT REVIEW (OUTPATIENT)
Age: 59
End: 2024-01-23

## 2024-01-23 ENCOUNTER — TRANSCRIPTION ENCOUNTER (OUTPATIENT)
Age: 59
End: 2024-01-23

## 2024-01-23 ENCOUNTER — OUTPATIENT (OUTPATIENT)
Dept: OUTPATIENT SERVICES | Facility: HOSPITAL | Age: 59
LOS: 1 days | End: 2024-01-23
Payer: COMMERCIAL

## 2024-01-23 ENCOUNTER — APPOINTMENT (OUTPATIENT)
Dept: HEART FAILURE | Facility: CLINIC | Age: 59
End: 2024-01-23
Payer: COMMERCIAL

## 2024-01-23 VITALS
OXYGEN SATURATION: 98 % | SYSTOLIC BLOOD PRESSURE: 100 MMHG | HEART RATE: 91 BPM | BODY MASS INDEX: 28.25 KG/M2 | HEIGHT: 67 IN | WEIGHT: 180 LBS | DIASTOLIC BLOOD PRESSURE: 60 MMHG

## 2024-01-23 VITALS
RESPIRATION RATE: 18 BRPM | BODY MASS INDEX: 28.25 KG/M2 | TEMPERATURE: 97.5 F | WEIGHT: 180 LBS | HEART RATE: 102 BPM | HEIGHT: 67 IN | SYSTOLIC BLOOD PRESSURE: 135 MMHG | DIASTOLIC BLOOD PRESSURE: 97 MMHG | OXYGEN SATURATION: 98 %

## 2024-01-23 VITALS
WEIGHT: 179.9 LBS | HEART RATE: 109 BPM | DIASTOLIC BLOOD PRESSURE: 97 MMHG | OXYGEN SATURATION: 98 % | HEIGHT: 67 IN | TEMPERATURE: 98 F | SYSTOLIC BLOOD PRESSURE: 135 MMHG | RESPIRATION RATE: 18 BRPM

## 2024-01-23 VITALS
OXYGEN SATURATION: 100 % | RESPIRATION RATE: 18 BRPM | DIASTOLIC BLOOD PRESSURE: 76 MMHG | SYSTOLIC BLOOD PRESSURE: 122 MMHG | HEART RATE: 97 BPM

## 2024-01-23 DIAGNOSIS — Z94.1 HEART TRANSPLANT STATUS: ICD-10-CM

## 2024-01-23 DIAGNOSIS — Z95.5 PRESENCE OF CORONARY ANGIOPLASTY IMPLANT AND GRAFT: Chronic | ICD-10-CM

## 2024-01-23 DIAGNOSIS — Z94.1 HEART TRANSPLANT STATUS: Chronic | ICD-10-CM

## 2024-01-23 LAB
ALBUMIN SERPL ELPH-MCNC: 4.1 G/DL — SIGNIFICANT CHANGE UP (ref 3.3–5)
ALP SERPL-CCNC: 95 U/L — SIGNIFICANT CHANGE UP (ref 40–120)
ALT FLD-CCNC: 30 U/L — SIGNIFICANT CHANGE UP (ref 10–45)
ANION GAP SERPL CALC-SCNC: 14 MMOL/L — SIGNIFICANT CHANGE UP (ref 5–17)
AST SERPL-CCNC: 17 U/L — SIGNIFICANT CHANGE UP (ref 10–40)
BASOPHILS # BLD AUTO: 0.01 K/UL — SIGNIFICANT CHANGE UP (ref 0–0.2)
BASOPHILS NFR BLD AUTO: 0.2 % — SIGNIFICANT CHANGE UP (ref 0–2)
BILIRUB SERPL-MCNC: 0.2 MG/DL — SIGNIFICANT CHANGE UP (ref 0.2–1.2)
BUN SERPL-MCNC: 34 MG/DL — HIGH (ref 7–23)
CALCIUM SERPL-MCNC: 9.6 MG/DL — SIGNIFICANT CHANGE UP (ref 8.4–10.5)
CHLORIDE SERPL-SCNC: 95 MMOL/L — LOW (ref 96–108)
CO2 SERPL-SCNC: 24 MMOL/L — SIGNIFICANT CHANGE UP (ref 22–31)
CREAT SERPL-MCNC: 1.07 MG/DL — SIGNIFICANT CHANGE UP (ref 0.5–1.3)
EGFR: 80 ML/MIN/1.73M2 — SIGNIFICANT CHANGE UP
EOSINOPHIL # BLD AUTO: 0.03 K/UL — SIGNIFICANT CHANGE UP (ref 0–0.5)
EOSINOPHIL NFR BLD AUTO: 0.5 % — SIGNIFICANT CHANGE UP (ref 0–6)
GLUCOSE SERPL-MCNC: 246 MG/DL — HIGH (ref 70–99)
HBV DNA # SERPL NAA+PROBE: SIGNIFICANT CHANGE UP
HBV DNA SERPL NAA+PROBE-LOG#: SIGNIFICANT CHANGE UP LOGIU/ML
HCT VFR BLD CALC: 34 % — LOW (ref 39–50)
HCV RNA FLD QL NAA+PROBE: SIGNIFICANT CHANGE UP
HCV RNA SPEC QL PROBE+SIG AMP: SIGNIFICANT CHANGE UP
HGB BLD-MCNC: 11.6 G/DL — LOW (ref 13–17)
HIV-1 VIRAL LOAD RESULT: SIGNIFICANT CHANGE UP
HIV1 RNA # SERPL NAA+PROBE: SIGNIFICANT CHANGE UP COPIES/ML
HIV1 RNA SER-IMP: SIGNIFICANT CHANGE UP
HIV1 RNA SERPL NAA+PROBE-ACNC: SIGNIFICANT CHANGE UP
HIV1 RNA SERPL NAA+PROBE-LOG#: SIGNIFICANT CHANGE UP LG COP/ML
IMM GRANULOCYTES NFR BLD AUTO: 1.1 % — HIGH (ref 0–0.9)
LYMPHOCYTES # BLD AUTO: 1.42 K/UL — SIGNIFICANT CHANGE UP (ref 1–3.3)
LYMPHOCYTES # BLD AUTO: 22.8 % — SIGNIFICANT CHANGE UP (ref 13–44)
MAGNESIUM SERPL-MCNC: 1.7 MG/DL — SIGNIFICANT CHANGE UP (ref 1.6–2.6)
MCHC RBC-ENTMCNC: 30.6 PG — SIGNIFICANT CHANGE UP (ref 27–34)
MCHC RBC-ENTMCNC: 34.1 GM/DL — SIGNIFICANT CHANGE UP (ref 32–36)
MCV RBC AUTO: 89.7 FL — SIGNIFICANT CHANGE UP (ref 80–100)
MONOCYTES # BLD AUTO: 0.39 K/UL — SIGNIFICANT CHANGE UP (ref 0–0.9)
MONOCYTES NFR BLD AUTO: 6.3 % — SIGNIFICANT CHANGE UP (ref 2–14)
NEUTROPHILS # BLD AUTO: 4.3 K/UL — SIGNIFICANT CHANGE UP (ref 1.8–7.4)
NEUTROPHILS NFR BLD AUTO: 69.1 % — SIGNIFICANT CHANGE UP (ref 43–77)
NRBC # BLD: 0 /100 WBCS — SIGNIFICANT CHANGE UP (ref 0–0)
PLATELET # BLD AUTO: 242 K/UL — SIGNIFICANT CHANGE UP (ref 150–400)
POTASSIUM SERPL-MCNC: 3.9 MMOL/L — SIGNIFICANT CHANGE UP (ref 3.5–5.3)
POTASSIUM SERPL-SCNC: 3.9 MMOL/L — SIGNIFICANT CHANGE UP (ref 3.5–5.3)
PROT SERPL-MCNC: 6.4 G/DL — SIGNIFICANT CHANGE UP (ref 6–8.3)
RBC # BLD: 3.79 M/UL — LOW (ref 4.2–5.8)
RBC # FLD: 16.6 % — HIGH (ref 10.3–14.5)
SODIUM SERPL-SCNC: 133 MMOL/L — LOW (ref 135–145)
TACROLIMUS SERPL-MCNC: 9.4 NG/ML — SIGNIFICANT CHANGE UP
WBC # BLD: 6.22 K/UL — SIGNIFICANT CHANGE UP (ref 3.8–10.5)
WBC # FLD AUTO: 6.22 K/UL — SIGNIFICANT CHANGE UP (ref 3.8–10.5)

## 2024-01-23 PROCEDURE — 93505 ENDOMYOCARDIAL BIOPSY: CPT | Mod: 26

## 2024-01-23 PROCEDURE — 93010 ELECTROCARDIOGRAM REPORT: CPT

## 2024-01-23 PROCEDURE — 93308 TTE F-UP OR LMTD: CPT | Mod: 26

## 2024-01-23 PROCEDURE — ZZZZZ: CPT

## 2024-01-23 PROCEDURE — 93321 DOPPLER ECHO F-UP/LMTD STD: CPT | Mod: 26

## 2024-01-23 PROCEDURE — 99152 MOD SED SAME PHYS/QHP 5/>YRS: CPT

## 2024-01-23 PROCEDURE — 88307 TISSUE EXAM BY PATHOLOGIST: CPT | Mod: 26

## 2024-01-23 PROCEDURE — 99214 OFFICE O/P EST MOD 30 MIN: CPT

## 2024-01-23 PROCEDURE — 88346 IMFLUOR 1ST 1ANTB STAIN PX: CPT | Mod: 26

## 2024-01-23 PROCEDURE — G2211 COMPLEX E/M VISIT ADD ON: CPT

## 2024-01-23 PROCEDURE — 99204 OFFICE O/P NEW MOD 45 MIN: CPT

## 2024-01-23 RX ORDER — BLOOD-GLUCOSE,RECEIVER,CONT
EACH MISCELLANEOUS
Qty: 1 | Refills: 1 | Status: ACTIVE | COMMUNITY
Start: 2024-01-23 | End: 1900-01-01

## 2024-01-23 NOTE — ASU PATIENT PROFILE, ADULT - FALL HARM RISK - UNIVERSAL INTERVENTIONS
Bed in lowest position, wheels locked, appropriate side rails in place/Call bell, personal items and telephone in reach/Instruct patient to call for assistance before getting out of bed or chair/Non-slip footwear when patient is out of bed/[place] to call system/Physically safe environment - no spills, clutter or unnecessary equipment/Purposeful Proactive Rounding/Room/bathroom lighting operational, light cord in reach

## 2024-01-23 NOTE — ASU PATIENT PROFILE, ADULT - MEDICATIONS BROUGHT TO HOSPITAL, PROFILE
Larry Valdovinos was seen and treated in our emergency department on 1/4/2024.    No restrictions            Diagnosis:     Larry  .    He may return on this date: 01/05/2024         If you have any questions or concerns, please don't hesitate to call.      Kip Martinez PA-C    ______________________________           _______________          _______________  Hospital Representative                              Date                                Time no

## 2024-01-23 NOTE — CARDIOLOGY SUMMARY
[de-identified] : 1/16/24: ST 103bpm MAGDA, right axis [de-identified] : 1/9: normal biventricular function 1/2: normal biventricular function [de-identified] : 1/23/24: RHC/EMBx pending 1/16/24: RHC/EMBx RA 4 PAP 26/10/18 PCW 13 PaSat 63% Juan CO/CI 6/3.1, EMBx ISHLT grade 0; AMR 0 1/9/24: RHC/EMBx: RA 5, RV 19/6/8, PA 21/13/17, PCWP 15, PA sat 68.9, EMBx ISHLT Grade 0R 1/2/24: RHC/EMBx: RA 4 PAP 20/12/15 PCW 15 PaSat 66% Juan CO/CI EMBx ISHLT Grade 0R [de-identified] : CT chest abd/pelvis 1/5/24: IMPRESSION: Question mild stercoral colitis. Expected postoperative changes in the chest. No fluid collection.  DSAs: 1/9: 0%  1/2: 0%  Heartcare: not collected yet

## 2024-01-23 NOTE — ASU DISCHARGE PLAN (ADULT/PEDIATRIC) - ASU DC SPECIAL INSTRUCTIONSFT
Wound Care:   the day AFTER your procedure remove bandage GENTLY, and clean using  mild soap and gentle warm, water stream, pat dry. leave OPEN to air. YOU MAY SHOWER   DO NOT apply lotions, creams, ointments, powder, perfumes to your incision site  DO NOT SOAK your site for 1 week ( no baths, no pools, no tubs, etc...)  Check  your groin and /or wrist daily.A small amount of bruising, and soarness are normal    ACTIVITY: for 24 hours   - DO NOT DRIVE  - DO NOT make any important decisions or sign legal documents   - DO NOT operate heavy machineries   - you may resume sexual activity in 48 hours, unless otherwise instructed by your cardiologist     If your procedure was done through the WRIST: for the NEXT 3DAYS:  - avoid pushing, pulling, with that affected wrist   - avoid repeated movement of that hand and wrist ( eg: typing, hammering)  - DO NOT LIFT anything more than 5 lbs     If your procedure was done through the GROIN: for the NEXT 5 DAYS  - Limit climbing stairs, DO NOT soak in bathtub or pool  - no strenuous activities, pushing, pulling, straining  - Do not lift anything 10lbs or heavier     MEDICATION:   take your medications as explained ( see discharge paperwork)   If you received a STENT, you will be taking antiplatelet medications to KEEP YOUR STENT OPEN ( eg: Aspirin, Plavix, Brilinta, Effient, etc).  Take as prescribed DO NOT STOP taking them without consulting with your cardiologist first.     Follow heart healthy diet recommended by your doctor, , if you smoke STOP SMOKING ( may call [phone] for center of tobacco control if you need assistance)     CALL your doctor to make appointment in 2 WEEKS     ***CALL YOUR DOCTOR***  if you experience: fever, chills, body aches, or severe pain, swelling, redness, heat or yellow discharge at incision site  If you experience Bleeding or excruciating pain at the procedural site, swelling ( golf ball size) at your procedural site  If you experience CHEST PAIN  If you experience extremity numbness, tingling, temperature change ( of your procedural site)   If you are unable to reach your doctor, you may contact:   -Cardiology Office at [facility] at [phone] or   - [facility] [phone]  - [facility] [phone]

## 2024-01-23 NOTE — DISCUSSION/SUMMARY
[FreeTextEntry1] : # s/p Heart transplant 12/25/23 - f/u RHC/EMBx today - currently off diuretics  - last TTE demonstrated normal findings - c/w ASA 81mg po daily now  - hold statin until LFT's normalize  #Immunosuppression.  - s/p Simulect on POD 12/25 and received second dose on POD 4 (12/29). - Continue with prednisone taper based on protocol  - Continue with Cellcept 1g PO BID - Will continue to adjust tacro as needed for goal 12-14. c/w fluconazole 100mg daily in order to help boost tacro levels  # Prophylactic antibiotic  - CMV -/+: intermediate risk.  Continue Valcyte 450 mg PO BID. Check CMV PCR monthly (not detected 1/16/24) - Toxo -/-: none required - PJP ppx: continue Bactrim S/S PO QD - Trush ppx: fluconazole as stated above  - donor HCV TIM - but HCV antibody +. Check 1 month [facility] labs (due 1/25/24), then 3 mo, 12 mo  # ARIC (acute kidney injury) - pre transplant baseline Cr 1.2, peaked to 4 - monitor closely, avoid nephrotoxic agents - no need for diuretics based on today's RHC  #elevated LFTs - continue to monitor - holding statin  #HTN - continue nifedipine 60mg bid - continue hydralazine 25mg TID  #Constipation - CT abd/pelvis showed stercoral colitis 1/5 - S/p MgCO3, lactulose 20 QID +/- golytely  - GI consulted and signed off  - Can also use bisacodyl suppository if pt amenable  - Encourage ambulation  # Hyperglycemia, steroid induced.  - Endo following, has appt today - discharged home on lantus 30 units and ISS; increased lantus to 34 units qhs - recommended to continue following diabetic diet  #health maintenance - continue MVI - continue calcium citrate - continue PPI - increase mag oxide to 800mg TID - will refer to cardiac rehab at 3 months post tx, encouraged home walking program   Time spent with patient 35 minutes [___ Week(s)] : in [unfilled] week(s)

## 2024-01-23 NOTE — ASSESSMENT
[FreeTextEntry1] : 57 y/o M with PMHx HFrEF, CAD s/p PCI, HTN, DMT2 and CVA s/p TPA who is now s/p OHT 12/25/23, clinically progressing well and here today for routine follow up care. Doing well post-operatively but has hyperglycemia despite adhering to insulin regimen.

## 2024-01-23 NOTE — HISTORY OF PRESENT ILLNESS
[FreeTextEntry1] : 57 y/o M with h/o HFrEF, CAD s/p PCI (2008), HTN, DMT2 (A1c 8.3%) and CVA s/p TPA (2018), who is now s/p OHT 12/25/23 (ischemic time: 253min, CMV -/+, Toxo -/-) and presents for routine follow-up/biopsy.   Pt initially presented to [facility] via EMS after syncope reportedly requiring defibrillation, left AMA to come to [facility] on 11/1/23 where he was found to have Covid and intubated for respiratory failure. Then underwent L/RHC on 11/1 revealing  of LAD and RCA, elevated filling pressures and CI of 1.5 which prompted placement of IABP which was subsequently removed c/b PMVT cardiac arrest requiring replacement of IABP on 11/9. During hospitalization, treated for Staph epi, Enterococcus faecalis, Cutibacterium with IV abx.  Pt was evaluated for heart transplant, listed Status 2, ABO A, PRA 0% and on 12/25 a suitable donor was identified and he underwent OHT. His first RHC/EMBx revealed elevated filling pressures and diuretics were adjusted. His course was complicated by leukocytosis, however infectious workup was negative and CT scan revealed significant constipation requiring disimpaction with improvement.  Pt was persistently hyperglycemic, endocrine adjusted insulin regimen. He underwent his second biopsy which revealed normal filling pressures and ISHLT Grade 0R, and was discharged home 1/9/23.  Pt arrives today for routine f/u outpatient visit and biopsy #4. He is accompanied by his daughter. He continues to report doing well, staying active daily, and each day feeling stronger. Home VS log revealed normotensive at home, weight stable 178-181lbs, -300's mg/dL, taking insulin as prescribed and has f/u with endocrine today. He reports moving bowels daily but using linzess once weekly. Denied blood in stool. Currently denied SOB, CP, palpitations, N/V/D, fever, chills or LE edema. His daughter is assisting with pillbox and pt reports 100% compliance.

## 2024-01-23 NOTE — H&P CARDIOLOGY - HISTORY OF PRESENT ILLNESS
58 year old male with HFrEF, CAD s/p MI and stent LAD 2008, HTN, DMT2 (A1c 8.3), CVA s/p TPA 2018, PNA, who initially presented to [facility] in acute decompensated HF 10/2023. IABP placed and subsequently weaned off 11/7. Next day developed VT arrest w prompt CPR  and defibrillation. Treated w Lido/Amio and IABP replaced on 11/9.  Found to be bacteremic and treated  w IV abx. Underwent OHT on 12/25/2023. Here today for 4th biopsy. Feeling well today- no complaints

## 2024-01-23 NOTE — ASU DISCHARGE PLAN (ADULT/PEDIATRIC) - NS MD DC FALL RISK RISK
For information on Fall & Injury Prevention, visit: [email or web address] OR  [email or web address] OR  https://www.cdc.gov/steadi/patient.html

## 2024-01-23 NOTE — PHYSICAL EXAM
[No Edema] : no edema [Normal] : no rash, no skin lesions [No Rash] : no rash [Moves all extremities] : moves all extremities [Alert and Oriented] : alert and oriented [de-identified] : +midsternal chest healing

## 2024-01-23 NOTE — ASU DISCHARGE PLAN (ADULT/PEDIATRIC) - CARE PROVIDER_API CALL
Francesca Richardson  Cardiovascular Disease  [address] [place] [address]  Phone: [phone]  Fax: [phone]  Follow Up Time:

## 2024-01-24 LAB
CMV DNA CSF QL NAA+PROBE: SIGNIFICANT CHANGE UP IU/ML
CMV DNA SPEC NAA+PROBE-LOG#: SIGNIFICANT CHANGE UP LOG10IU/ML
HGB FLD-MCNC: 10.7 G/DL — LOW (ref 12.6–17.4)
HGB FLD-MCNC: 9.8 G/DL — LOW (ref 12.6–17.4)
OXYHGB MFR BLDMV: 65.7 % — LOW (ref 90–95)
OXYHGB MFR BLDMV: 66.5 % — LOW (ref 90–95)
SAO2 % BLD: 66.6 % — SIGNIFICANT CHANGE UP (ref 60–90)
SAO2 % BLD: 67.2 % — SIGNIFICANT CHANGE UP (ref 60–90)
SURGICAL PATHOLOGY STUDY: SIGNIFICANT CHANGE UP

## 2024-01-26 LAB
CREAT SPEC-SCNC: 54 MG/DL
MICROALBUMIN 24H UR DL<=1MG/L-MCNC: 10.5 MG/DL
MICROALBUMIN/CREAT 24H UR-RTO: 193 MG/G

## 2024-01-31 LAB
ALBUMIN SERPL ELPH-MCNC: 4.4 G/DL
ALP BLD-CCNC: 95 U/L
ALT SERPL-CCNC: 32 U/L
ANION GAP SERPL CALC-SCNC: 13 MMOL/L
AST SERPL-CCNC: 23 U/L
BASOPHILS # BLD AUTO: 0.03 K/UL
BASOPHILS NFR BLD AUTO: 0.3 %
BILIRUB SERPL-MCNC: 0.2 MG/DL
BUN SERPL-MCNC: 33 MG/DL
CALCIUM SERPL-MCNC: 9.6 MG/DL
CHLORIDE SERPL-SCNC: 95 MMOL/L
CO2 SERPL-SCNC: 25 MMOL/L
CREAT SERPL-MCNC: 1.03 MG/DL
EGFR: 84 ML/MIN/1.73M2
EOSINOPHIL # BLD AUTO: 0.01 K/UL
EOSINOPHIL NFR BLD AUTO: 0.1 %
GLUCOSE SERPL-MCNC: 270 MG/DL
HCT VFR BLD CALC: 41.2 %
HGB BLD-MCNC: 13.8 G/DL
IMM GRANULOCYTES NFR BLD AUTO: 0.9 %
LYMPHOCYTES # BLD AUTO: 1.53 K/UL
LYMPHOCYTES NFR BLD AUTO: 16.4 %
MAGNESIUM SERPL-MCNC: 1.7 MG/DL
MAN DIFF?: NORMAL
MCHC RBC-ENTMCNC: 30.3 PG
MCHC RBC-ENTMCNC: 33.5 GM/DL
MCV RBC AUTO: 90.5 FL
MONOCYTES # BLD AUTO: 0.55 K/UL
MONOCYTES NFR BLD AUTO: 5.9 %
NEUTROPHILS # BLD AUTO: 7.12 K/UL
NEUTROPHILS NFR BLD AUTO: 76.4 %
PLATELET # BLD AUTO: 223 K/UL
POTASSIUM SERPL-SCNC: 3.7 MMOL/L
PROT SERPL-MCNC: 6.8 G/DL
RBC # BLD: 4.55 M/UL
RBC # FLD: 15.9 %
SODIUM SERPL-SCNC: 134 MMOL/L
TACROLIMUS SERPL-MCNC: 14.3 NG/ML
WBC # FLD AUTO: 9.32 K/UL

## 2024-02-01 ENCOUNTER — NON-APPOINTMENT (OUTPATIENT)
Age: 59
End: 2024-02-01

## 2024-02-06 ENCOUNTER — TRANSCRIPTION ENCOUNTER (OUTPATIENT)
Age: 59
End: 2024-02-06

## 2024-02-06 ENCOUNTER — APPOINTMENT (OUTPATIENT)
Dept: HEART FAILURE | Facility: CLINIC | Age: 59
End: 2024-02-06
Payer: COMMERCIAL

## 2024-02-06 ENCOUNTER — APPOINTMENT (OUTPATIENT)
Dept: CV DIAGNOSITCS | Facility: HOSPITAL | Age: 59
End: 2024-02-06

## 2024-02-06 ENCOUNTER — OUTPATIENT (OUTPATIENT)
Dept: OUTPATIENT SERVICES | Facility: HOSPITAL | Age: 59
LOS: 1 days | End: 2024-02-06
Payer: COMMERCIAL

## 2024-02-06 ENCOUNTER — RESULT REVIEW (OUTPATIENT)
Age: 59
End: 2024-02-06

## 2024-02-06 VITALS — WEIGHT: 179.9 LBS | HEIGHT: 67 IN | HEART RATE: 16 BPM

## 2024-02-06 VITALS
RESPIRATION RATE: 16 BRPM | DIASTOLIC BLOOD PRESSURE: 90 MMHG | SYSTOLIC BLOOD PRESSURE: 133 MMHG | HEART RATE: 98 BPM | OXYGEN SATURATION: 97 %

## 2024-02-06 VITALS
RESPIRATION RATE: 16 BRPM | OXYGEN SATURATION: 99 % | HEART RATE: 102 BPM | DIASTOLIC BLOOD PRESSURE: 82 MMHG | TEMPERATURE: 97.9 F | SYSTOLIC BLOOD PRESSURE: 112 MMHG

## 2024-02-06 VITALS — WEIGHT: 180 LBS | HEIGHT: 67 IN | RESPIRATION RATE: 16 BRPM | BODY MASS INDEX: 28.25 KG/M2

## 2024-02-06 DIAGNOSIS — Z95.5 PRESENCE OF CORONARY ANGIOPLASTY IMPLANT AND GRAFT: Chronic | ICD-10-CM

## 2024-02-06 DIAGNOSIS — Z94.1 HEART TRANSPLANT STATUS: ICD-10-CM

## 2024-02-06 DIAGNOSIS — Z94.1 HEART TRANSPLANT STATUS: Chronic | ICD-10-CM

## 2024-02-06 LAB
ALBUMIN SERPL ELPH-MCNC: 4.2 G/DL — SIGNIFICANT CHANGE UP (ref 3.3–5)
ALP SERPL-CCNC: 76 U/L — SIGNIFICANT CHANGE UP (ref 40–120)
ALT FLD-CCNC: 23 U/L — SIGNIFICANT CHANGE UP (ref 10–45)
ANION GAP SERPL CALC-SCNC: 16 MMOL/L — SIGNIFICANT CHANGE UP (ref 5–17)
AST SERPL-CCNC: 17 U/L — SIGNIFICANT CHANGE UP (ref 10–40)
BASOPHILS # BLD AUTO: 0 K/UL — SIGNIFICANT CHANGE UP (ref 0–0.2)
BASOPHILS NFR BLD AUTO: 0 % — SIGNIFICANT CHANGE UP (ref 0–2)
BILIRUB SERPL-MCNC: 0.2 MG/DL — SIGNIFICANT CHANGE UP (ref 0.2–1.2)
BUN SERPL-MCNC: 49 MG/DL — HIGH (ref 7–23)
CALCIUM SERPL-MCNC: 9.4 MG/DL — SIGNIFICANT CHANGE UP (ref 8.4–10.5)
CHLORIDE SERPL-SCNC: 97 MMOL/L — SIGNIFICANT CHANGE UP (ref 96–108)
CO2 SERPL-SCNC: 21 MMOL/L — LOW (ref 22–31)
CREAT SERPL-MCNC: 1.62 MG/DL — HIGH (ref 0.5–1.3)
EGFR: 49 ML/MIN/1.73M2 — LOW
EOSINOPHIL # BLD AUTO: 0 K/UL — SIGNIFICANT CHANGE UP (ref 0–0.5)
EOSINOPHIL NFR BLD AUTO: 0 % — SIGNIFICANT CHANGE UP (ref 0–6)
GLUCOSE SERPL-MCNC: 242 MG/DL — HIGH (ref 70–99)
HCT VFR BLD CALC: 39 % — SIGNIFICANT CHANGE UP (ref 39–50)
HGB BLD-MCNC: 13.3 G/DL — SIGNIFICANT CHANGE UP (ref 13–17)
HGB FLD-MCNC: 13 G/DL — SIGNIFICANT CHANGE UP (ref 12.6–17.4)
LYMPHOCYTES # BLD AUTO: 1.25 K/UL — SIGNIFICANT CHANGE UP (ref 1–3.3)
LYMPHOCYTES # BLD AUTO: 10.7 % — LOW (ref 13–44)
MAGNESIUM SERPL-MCNC: 1.7 MG/DL — SIGNIFICANT CHANGE UP (ref 1.6–2.6)
MANUAL SMEAR VERIFICATION: SIGNIFICANT CHANGE UP
MCHC RBC-ENTMCNC: 29.8 PG — SIGNIFICANT CHANGE UP (ref 27–34)
MCHC RBC-ENTMCNC: 34.1 GM/DL — SIGNIFICANT CHANGE UP (ref 32–36)
MCV RBC AUTO: 87.2 FL — SIGNIFICANT CHANGE UP (ref 80–100)
METAMYELOCYTES # FLD: 0.9 % — HIGH (ref 0–0)
MONOCYTES # BLD AUTO: 0.74 K/UL — SIGNIFICANT CHANGE UP (ref 0–0.9)
MONOCYTES NFR BLD AUTO: 6.3 % — SIGNIFICANT CHANGE UP (ref 2–14)
NEUTROPHILS # BLD AUTO: 9.61 K/UL — HIGH (ref 1.8–7.4)
NEUTROPHILS NFR BLD AUTO: 82.1 % — HIGH (ref 43–77)
OXYHGB MFR BLDMV: 69.9 % — LOW (ref 90–95)
PLAT MORPH BLD: NORMAL — SIGNIFICANT CHANGE UP
PLATELET # BLD AUTO: 238 K/UL — SIGNIFICANT CHANGE UP (ref 150–400)
POTASSIUM SERPL-MCNC: 3.3 MMOL/L — LOW (ref 3.5–5.3)
POTASSIUM SERPL-SCNC: 3.3 MMOL/L — LOW (ref 3.5–5.3)
PROT SERPL-MCNC: 6.4 G/DL — SIGNIFICANT CHANGE UP (ref 6–8.3)
RBC # BLD: 4.47 M/UL — SIGNIFICANT CHANGE UP (ref 4.2–5.8)
RBC # FLD: 15.2 % — HIGH (ref 10.3–14.5)
RBC BLD AUTO: SIGNIFICANT CHANGE UP
SAO2 % BLD: 70.3 % — SIGNIFICANT CHANGE UP (ref 60–90)
SODIUM SERPL-SCNC: 134 MMOL/L — LOW (ref 135–145)
TACROLIMUS SERPL-MCNC: 19.9 NG/ML — SIGNIFICANT CHANGE UP
WBC # BLD: 11.7 K/UL — HIGH (ref 3.8–10.5)
WBC # FLD AUTO: 11.7 K/UL — HIGH (ref 3.8–10.5)

## 2024-02-06 PROCEDURE — 80197 ASSAY OF TACROLIMUS: CPT

## 2024-02-06 PROCEDURE — 93005 ELECTROCARDIOGRAM TRACING: CPT

## 2024-02-06 PROCEDURE — 93505 ENDOMYOCARDIAL BIOPSY: CPT | Mod: 26

## 2024-02-06 PROCEDURE — 93308 TTE F-UP OR LMTD: CPT

## 2024-02-06 PROCEDURE — 93321 DOPPLER ECHO F-UP/LMTD STD: CPT | Mod: 26

## 2024-02-06 PROCEDURE — 88346 IMFLUOR 1ST 1ANTB STAIN PX: CPT

## 2024-02-06 PROCEDURE — 87536 HIV-1 QUANT&REVRSE TRNSCRPJ: CPT

## 2024-02-06 PROCEDURE — 93505 ENDOMYOCARDIAL BIOPSY: CPT

## 2024-02-06 PROCEDURE — 87521 HEPATITIS C PROBE&RVRS TRNSC: CPT

## 2024-02-06 PROCEDURE — 85025 COMPLETE CBC W/AUTO DIFF WBC: CPT

## 2024-02-06 PROCEDURE — C1769: CPT

## 2024-02-06 PROCEDURE — C1894: CPT

## 2024-02-06 PROCEDURE — ZZZZZ: CPT

## 2024-02-06 PROCEDURE — 82803 BLOOD GASES ANY COMBINATION: CPT

## 2024-02-06 PROCEDURE — 93010 ELECTROCARDIOGRAM REPORT: CPT

## 2024-02-06 PROCEDURE — 93321 DOPPLER ECHO F-UP/LMTD STD: CPT

## 2024-02-06 PROCEDURE — 87517 HEPATITIS B DNA QUANT: CPT

## 2024-02-06 PROCEDURE — 93308 TTE F-UP OR LMTD: CPT | Mod: 26

## 2024-02-06 PROCEDURE — 80053 COMPREHEN METABOLIC PANEL: CPT

## 2024-02-06 PROCEDURE — C1887: CPT

## 2024-02-06 PROCEDURE — 88346 IMFLUOR 1ST 1ANTB STAIN PX: CPT | Mod: 26

## 2024-02-06 PROCEDURE — 88307 TISSUE EXAM BY PATHOLOGIST: CPT

## 2024-02-06 PROCEDURE — 99152 MOD SED SAME PHYS/QHP 5/>YRS: CPT

## 2024-02-06 PROCEDURE — 88307 TISSUE EXAM BY PATHOLOGIST: CPT | Mod: 26

## 2024-02-06 PROCEDURE — 83735 ASSAY OF MAGNESIUM: CPT

## 2024-02-06 RX ORDER — TACROLIMUS 5 MG/1
2 CAPSULE ORAL
Refills: 0 | DISCHARGE

## 2024-02-06 RX ORDER — POTASSIUM CHLORIDE 20 MEQ
40 PACKET (EA) ORAL ONCE
Refills: 0 | Status: DISCONTINUED | OUTPATIENT
Start: 2024-02-06 | End: 2024-02-20

## 2024-02-06 NOTE — ASU DISCHARGE PLAN (ADULT/PEDIATRIC) - CARE PROVIDER_API CALL
Francesca Richardson  Cardiovascular Disease  [address] [place] [address]  Phone: [phone]  Fax: [phone]  Established Patient  Follow Up Time: Routine

## 2024-02-08 NOTE — CARDIOLOGY SUMMARY
[de-identified] : 2/6/24: ST 102bpm, MAGDA, right axis (unchanged compared to previous EKG 1/16/24) 1/16/24: ST 103bpm MAGDA, right axis [de-identified] : 2/6: TTE: CONCLUSIONS:  1. Limited TTE Pre and Post RV biopsy. No significant changes noted.  2. Left ventricular systolic function is normal with an ejection fraction visually estimated at 55 to 60 %.  3. Normal right ventricular cavity size and reduced systolic function.  4. Trace tricuspid regurgitation.  5. Moderate pericardial effusion with no evidence of hemodynamic compromise (or echocardiographic evidence of cardiac tamponade).  6. Compared to the transthoracic echocardiogram performed on 1/23/2024, The effusion appears larger.. Findings were discussed with Dr. Francesca Richardson on 2/6/2024 at bedside during TTE.  1/9: normal biventricular function 1/2: normal biventricular function [de-identified] : 2/6/24: RHC/EMBx RA 6 PAP 32/18/23 PCW 22 Pasat 70% Juan CO/CI 5.7/2.9, EMBx ISHLT Grade 1R/1A, CD4 negative 1/23/24: RHC/EMBx ISHLT Grade 1R/1A 1/16/24: RHC/EMBx RA 4 PAP 26/10/18 PCW 13 PaSat 63% Juan CO/CI 6/3.1, EMBx ISHLT grade 0; AMR 0 1/9/24: RHC/EMBx: RA 5, RV 19/6/8, PA 21/13/17, PCWP 15, PA sat 68.9, EMBx ISHLT Grade 0R 1/2/24: RHC/EMBx: RA 4 PAP 20/12/15 PCW 15 PaSat 66% Juan CO/CI EMBx ISHLT Grade 0R [de-identified] : CT chest abd/pelvis 1/5/24: IMPRESSION: Question mild stercoral colitis. Expected postoperative changes in the chest. No fluid collection.  DSAs: 1/9: 0%  1/2: 0%  Heartcare: not collected yet

## 2024-02-08 NOTE — END OF VISIT
[FreeTextEntry3] : Patient seen and examined with NP.  I agree with the above with the following exceptions: RCH /EMB # 5 today: RA 6, RV 32/5, PA 32/18 (23), PCWP 22, CO/CI 5.70/2.95, Grade 1R/1A, C4d negative. Of note, he has a small - moderate pericardial effusion present pre-biopsy today.  However, effusion appears slightly bigger on echo-to-echo comparisons from last biopsy.  Patient is not due for his next biopsy for 2 weeks.  Will have interval surveillance echocardiogram done next week to monitor effusion.  He is not on AC.   Cr elevated today.  Normal filling pressures.  Not on diuretics.  Will repeat labs, if needed can consider changing TMP-SMX to mepron. Tacrolimus level was 19.  Will adjust to goal 12-14.

## 2024-02-08 NOTE — HISTORY OF PRESENT ILLNESS
[FreeTextEntry1] : 59 y/o M with h/o HFrEF, CAD s/p PCI (2008), HTN, DMT2 (A1c 8.3%) and CVA s/p TPA (2018), who is now s/p OHT 12/25/23 (ischemic time: 253min, CMV -/+, Toxo -/-) and presents for routine follow-up/biopsy.   Pt initially presented to [facility] via EMS after syncope reportedly requiring defibrillation, left AMA to come to [facility] on 11/1/23 where he was found to have Covid and intubated for respiratory failure. Then underwent L/RHC on 11/1 revealing  of LAD and RCA, elevated filling pressures and CI of 1.5 which prompted placement of IABP which was subsequently removed c/b PMVT cardiac arrest requiring replacement of IABP on 11/9. During hospitalization, treated for Staph epi, Enterococcus faecalis, Cutibacterium with IV abx.  Pt was evaluated for heart transplant, listed Status 2, ABO A, PRA 0% and on 12/25 a suitable donor was identified and he underwent OHT. His first RHC/EMBx revealed elevated filling pressures and diuretics were adjusted. His course was complicated by leukocytosis, however infectious workup was negative and CT scan revealed significant constipation requiring disimpaction with improvement.  Pt was persistently hyperglycemic, endocrine adjusted insulin regimen. He underwent his second biopsy which revealed normal filling pressures and ISHLT Grade 0R, and was discharged home 1/9/23.  Pt arrives today for routine f/u outpatient visit and biopsy #5. He is accompanied by his daughter. He continues to report doing well, staying active daily, and starting exercising with his .  Home VS log revealed normotensive at home, weight stable 180 lbs, FS 's mg/dL, taking insulin as prescribed and is being followed by endocrine. He reports last week he had looser stools 2-3x/day but now having soft, brown stools. Denied blood in stool. Reports he still doesn't have full sense of taste but appetite is good. Sleeping well.  Currently denied SOB, CP, palpitations, N/V/D, fever, chills or LE edema. Pt reports 100% compliance with medications.

## 2024-02-08 NOTE — PHYSICAL EXAM
[No Edema] : no edema [Normal] : no rash, no skin lesions [No Rash] : no rash [Moves all extremities] : moves all extremities [Alert and Oriented] : alert and oriented [de-identified] : +midsternal chest healed

## 2024-02-08 NOTE — DISCUSSION/SUMMARY
[___ Week(s)] : in [unfilled] week(s) [FreeTextEntry1] : # s/p Heart transplant 12/25/23 - f/u RHC/EMBx today - currently off diuretics  - last TTE demonstrated normal findings - c/w ASA 81mg po daily now  - c/w statin now that LFT's normalized  #Immunosuppression - s/p Simulect on POD 12/25 and received second dose on POD 4 (12/29). - Continue with prednisone taper based on protocol  - Continue with Cellcept 1g PO BID - Will continue to adjust tacro as needed for goal 12-14. c/w fluconazole 100mg daily in order to help boost tacro levels  # Prophylactic antibiotic  - CMV -/+: intermediate risk.  Continue Valcyte 450 mg PO BID. Check CMV PCR monthly (not detected 2/6/24) - Toxo -/-: none required - PJP ppx: continue Bactrim S/S PO QD - Trush ppx: fluconazole as stated above  - donor HCV TIM - but HCV antibody +. Check 1 month [facility]HS labs (not detected 1/25/24), then 3 mo, 12 mo  # ARIC (acute kidney injury) - pre transplant baseline Cr 1.2, peaked to 4 - monitor closely, avoid nephrotoxic agents - no need for diuretics based on today's RHC - will consider switch to mepron if repeat SCr next week remains elevated  #moderate pericardial effusion - continue to monitor - repeat TTE next week  #HTN - continue nifedipine 60mg bid - continue hydralazine 25mg TID  #Constipation - CT abd/pelvis showed stercoral colitis 1/5 - S/p MgCO3, lactulose 20 QID +/- golytely  - GI consulted and signed off  - Can also use bisacodyl suppository if pt amenable  - Encourage ambulation  # Hyperglycemia, steroid induced.  - Endo following, mango placed - discharged home on lantus 30 units and ISS; increased lantus to 36 units qhs - recommended to continue following diabetic diet - will reach out to endocrine and ask for closer f/u   #health maintenance - continue MVI - continue calcium citrate - continue PPI - c/w mag oxide 800mg TID - will refer to cardiac rehab at 3 months post tx, encouraged home walking program   Time spent with patient 35 minutes

## 2024-02-12 ENCOUNTER — APPOINTMENT (OUTPATIENT)
Dept: ENDOCRINOLOGY | Facility: CLINIC | Age: 59
End: 2024-02-12

## 2024-02-12 ENCOUNTER — APPOINTMENT (OUTPATIENT)
Dept: ENDOCRINOLOGY | Facility: CLINIC | Age: 59
End: 2024-02-12
Payer: COMMERCIAL

## 2024-02-12 PROCEDURE — G0108 DIAB MANAGE TRN  PER INDIV: CPT

## 2024-02-12 PROCEDURE — 98960 EDU&TRN PT SELF-MGMT NQHP 1: CPT

## 2024-02-12 PROCEDURE — 95251 CONT GLUC MNTR ANALYSIS I&R: CPT

## 2024-02-14 ENCOUNTER — OUTPATIENT (OUTPATIENT)
Dept: OUTPATIENT SERVICES | Facility: HOSPITAL | Age: 59
LOS: 1 days | End: 2024-02-14
Payer: COMMERCIAL

## 2024-02-14 ENCOUNTER — LABORATORY RESULT (OUTPATIENT)
Age: 59
End: 2024-02-14

## 2024-02-14 ENCOUNTER — APPOINTMENT (OUTPATIENT)
Dept: CV DIAGNOSITCS | Facility: HOSPITAL | Age: 59
End: 2024-02-14

## 2024-02-14 ENCOUNTER — RESULT REVIEW (OUTPATIENT)
Age: 59
End: 2024-02-14

## 2024-02-14 DIAGNOSIS — Z95.5 PRESENCE OF CORONARY ANGIOPLASTY IMPLANT AND GRAFT: Chronic | ICD-10-CM

## 2024-02-14 DIAGNOSIS — Z94.1 HEART TRANSPLANT STATUS: Chronic | ICD-10-CM

## 2024-02-14 DIAGNOSIS — Z94.1 HEART TRANSPLANT STATUS: ICD-10-CM

## 2024-02-14 LAB
ALBUMIN SERPL ELPH-MCNC: 4.4 G/DL
ALP BLD-CCNC: 65 U/L
ALT SERPL-CCNC: 21 U/L
ANION GAP SERPL CALC-SCNC: 16 MMOL/L
AST SERPL-CCNC: 24 U/L
BASOPHILS # BLD AUTO: 0 K/UL
BASOPHILS NFR BLD AUTO: 0 %
BILIRUB SERPL-MCNC: 0.2 MG/DL
BUN SERPL-MCNC: 36 MG/DL
CALCIUM SERPL-MCNC: 10.1 MG/DL
CHLORIDE SERPL-SCNC: 97 MMOL/L
CO2 SERPL-SCNC: 21 MMOL/L
CREAT SERPL-MCNC: 1.3 MG/DL
EGFR: 63 ML/MIN/1.73M2
EOSINOPHIL # BLD AUTO: 0.05 K/UL
EOSINOPHIL NFR BLD AUTO: 0.8 %
GLUCOSE SERPL-MCNC: 264 MG/DL
HCT VFR BLD CALC: 38.7 %
HGB BLD-MCNC: 13.1 G/DL
LYMPHOCYTES # BLD AUTO: 1.11 K/UL
LYMPHOCYTES NFR BLD AUTO: 17.4 %
MAGNESIUM SERPL-MCNC: 1.9 MG/DL
MAN DIFF?: NORMAL
MCHC RBC-ENTMCNC: 29.3 PG
MCHC RBC-ENTMCNC: 33.9 GM/DL
MCV RBC AUTO: 86.6 FL
MONOCYTES # BLD AUTO: 0.39 K/UL
MONOCYTES NFR BLD AUTO: 6.1 %
NEUTROPHILS # BLD AUTO: 4.66 K/UL
NEUTROPHILS NFR BLD AUTO: 72.2 %
PLATELET # BLD AUTO: 263 K/UL
POTASSIUM SERPL-SCNC: 3.6 MMOL/L
PROT SERPL-MCNC: 6.4 G/DL
RBC # BLD: 4.47 M/UL
RBC # FLD: 15 %
SODIUM SERPL-SCNC: 134 MMOL/L
TACROLIMUS SERPL-MCNC: 19.7 NG/ML
WBC # FLD AUTO: 6.38 K/UL

## 2024-02-14 PROCEDURE — 93308 TTE F-UP OR LMTD: CPT | Mod: 26

## 2024-02-14 PROCEDURE — 93308 TTE F-UP OR LMTD: CPT

## 2024-02-14 PROCEDURE — 93321 DOPPLER ECHO F-UP/LMTD STD: CPT | Mod: 26

## 2024-02-14 PROCEDURE — 93321 DOPPLER ECHO F-UP/LMTD STD: CPT

## 2024-02-17 ENCOUNTER — APPOINTMENT (OUTPATIENT)
Dept: CT IMAGING | Facility: IMAGING CENTER | Age: 59
End: 2024-02-17

## 2024-02-20 ENCOUNTER — TRANSCRIPTION ENCOUNTER (OUTPATIENT)
Age: 59
End: 2024-02-20

## 2024-02-20 ENCOUNTER — APPOINTMENT (OUTPATIENT)
Dept: CV DIAGNOSITCS | Facility: HOSPITAL | Age: 59
End: 2024-02-20

## 2024-02-20 ENCOUNTER — RESULT REVIEW (OUTPATIENT)
Age: 59
End: 2024-02-20

## 2024-02-20 ENCOUNTER — APPOINTMENT (OUTPATIENT)
Dept: HEART FAILURE | Facility: CLINIC | Age: 59
End: 2024-02-20
Payer: COMMERCIAL

## 2024-02-20 ENCOUNTER — OUTPATIENT (OUTPATIENT)
Dept: OUTPATIENT SERVICES | Facility: HOSPITAL | Age: 59
LOS: 1 days | End: 2024-02-20
Payer: COMMERCIAL

## 2024-02-20 ENCOUNTER — INPATIENT (INPATIENT)
Facility: HOSPITAL | Age: 59
LOS: 0 days | Discharge: LEFT BEFORE DISCHARGE | DRG: 287 | End: 2024-02-20
Attending: TRANSPLANT SURGERY | Admitting: TRANSPLANT SURGERY
Payer: COMMERCIAL

## 2024-02-20 VITALS
RESPIRATION RATE: 18 BRPM | HEART RATE: 106 BPM | TEMPERATURE: 97 F | SYSTOLIC BLOOD PRESSURE: 132 MMHG | OXYGEN SATURATION: 97 % | HEIGHT: 67 IN | WEIGHT: 186.29 LBS | DIASTOLIC BLOOD PRESSURE: 84 MMHG

## 2024-02-20 VITALS
TEMPERATURE: 98 F | WEIGHT: 179.9 LBS | SYSTOLIC BLOOD PRESSURE: 136 MMHG | DIASTOLIC BLOOD PRESSURE: 92 MMHG | HEIGHT: 67 IN | HEART RATE: 95 BPM | RESPIRATION RATE: 18 BRPM | OXYGEN SATURATION: 99 %

## 2024-02-20 VITALS
SYSTOLIC BLOOD PRESSURE: 136 MMHG | WEIGHT: 179.8 LBS | HEART RATE: 95 BPM | DIASTOLIC BLOOD PRESSURE: 92 MMHG | BODY MASS INDEX: 28.22 KG/M2 | OXYGEN SATURATION: 99 % | RESPIRATION RATE: 18 BRPM | TEMPERATURE: 97.5 F | HEIGHT: 67 IN

## 2024-02-20 VITALS
DIASTOLIC BLOOD PRESSURE: 65 MMHG | OXYGEN SATURATION: 96 % | HEART RATE: 71 BPM | TEMPERATURE: 99 F | RESPIRATION RATE: 18 BRPM | SYSTOLIC BLOOD PRESSURE: 103 MMHG

## 2024-02-20 VITALS
RESPIRATION RATE: 18 BRPM | SYSTOLIC BLOOD PRESSURE: 146 MMHG | HEART RATE: 93 BPM | OXYGEN SATURATION: 98 % | DIASTOLIC BLOOD PRESSURE: 94 MMHG

## 2024-02-20 DIAGNOSIS — N17.9 ACUTE KIDNEY FAILURE, UNSPECIFIED: ICD-10-CM

## 2024-02-20 DIAGNOSIS — Z94.1 HEART TRANSPLANT STATUS: Chronic | ICD-10-CM

## 2024-02-20 DIAGNOSIS — I31.39 OTHER PERICARDIAL EFFUSION (NONINFLAMMATORY): ICD-10-CM

## 2024-02-20 DIAGNOSIS — Z95.5 PRESENCE OF CORONARY ANGIOPLASTY IMPLANT AND GRAFT: Chronic | ICD-10-CM

## 2024-02-20 DIAGNOSIS — Z94.1 HEART TRANSPLANT STATUS: ICD-10-CM

## 2024-02-20 DIAGNOSIS — I10 ESSENTIAL (PRIMARY) HYPERTENSION: ICD-10-CM

## 2024-02-20 DIAGNOSIS — D84.9 IMMUNODEFICIENCY, UNSPECIFIED: ICD-10-CM

## 2024-02-20 PROBLEM — G47.33 OBSTRUCTIVE SLEEP APNEA: Status: ACTIVE | Noted: 2018-08-24

## 2024-02-20 LAB
ADD ON TEST-SPECIMEN IN LAB: SIGNIFICANT CHANGE UP
ALBUMIN SERPL ELPH-MCNC: 4.3 G/DL — SIGNIFICANT CHANGE UP (ref 3.3–5)
ALBUMIN SERPL ELPH-MCNC: 4.3 G/DL — SIGNIFICANT CHANGE UP (ref 3.3–5)
ALP SERPL-CCNC: 61 U/L — SIGNIFICANT CHANGE UP (ref 40–120)
ALP SERPL-CCNC: 63 U/L — SIGNIFICANT CHANGE UP (ref 40–120)
ALT FLD-CCNC: 22 U/L — SIGNIFICANT CHANGE UP (ref 10–45)
ALT FLD-CCNC: 26 U/L — SIGNIFICANT CHANGE UP (ref 10–45)
ANION GAP SERPL CALC-SCNC: 13 MMOL/L — SIGNIFICANT CHANGE UP (ref 5–17)
ANION GAP SERPL CALC-SCNC: 17 MMOL/L — SIGNIFICANT CHANGE UP (ref 5–17)
APTT BLD: 25.5 SEC — SIGNIFICANT CHANGE UP (ref 24.5–35.6)
AST SERPL-CCNC: 31 U/L — SIGNIFICANT CHANGE UP (ref 10–40)
AST SERPL-CCNC: 34 U/L — SIGNIFICANT CHANGE UP (ref 10–40)
BASOPHILS # BLD AUTO: 0.05 K/UL — SIGNIFICANT CHANGE UP (ref 0–0.2)
BASOPHILS NFR BLD AUTO: 0.9 % — SIGNIFICANT CHANGE UP (ref 0–2)
BILIRUB SERPL-MCNC: 0.2 MG/DL — SIGNIFICANT CHANGE UP (ref 0.2–1.2)
BILIRUB SERPL-MCNC: 0.2 MG/DL — SIGNIFICANT CHANGE UP (ref 0.2–1.2)
BLD GP AB SCN SERPL QL: NEGATIVE — SIGNIFICANT CHANGE UP
BUN SERPL-MCNC: 29 MG/DL — HIGH (ref 7–23)
BUN SERPL-MCNC: 29 MG/DL — HIGH (ref 7–23)
CALCIUM SERPL-MCNC: 9.8 MG/DL — SIGNIFICANT CHANGE UP (ref 8.4–10.5)
CALCIUM SERPL-MCNC: 9.8 MG/DL — SIGNIFICANT CHANGE UP (ref 8.4–10.5)
CHLORIDE SERPL-SCNC: 94 MMOL/L — LOW (ref 96–108)
CHLORIDE SERPL-SCNC: 96 MMOL/L — SIGNIFICANT CHANGE UP (ref 96–108)
CO2 SERPL-SCNC: 21 MMOL/L — LOW (ref 22–31)
CO2 SERPL-SCNC: 24 MMOL/L — SIGNIFICANT CHANGE UP (ref 22–31)
CREAT SERPL-MCNC: 1.14 MG/DL — SIGNIFICANT CHANGE UP (ref 0.5–1.3)
CREAT SERPL-MCNC: 1.34 MG/DL — HIGH (ref 0.5–1.3)
EGFR: 61 ML/MIN/1.73M2 — SIGNIFICANT CHANGE UP
EGFR: 74 ML/MIN/1.73M2 — SIGNIFICANT CHANGE UP
EOSINOPHIL # BLD AUTO: 0 K/UL — SIGNIFICANT CHANGE UP (ref 0–0.5)
EOSINOPHIL NFR BLD AUTO: 0 % — SIGNIFICANT CHANGE UP (ref 0–6)
GLUCOSE BLDC GLUCOMTR-MCNC: 307 MG/DL — HIGH (ref 70–99)
GLUCOSE BLDC GLUCOMTR-MCNC: 322 MG/DL — HIGH (ref 70–99)
GLUCOSE BLDC GLUCOMTR-MCNC: 376 MG/DL — HIGH (ref 70–99)
GLUCOSE SERPL-MCNC: 313 MG/DL — HIGH (ref 70–99)
GLUCOSE SERPL-MCNC: 321 MG/DL — HIGH (ref 70–99)
HCT VFR BLD CALC: 38.2 % — LOW (ref 39–50)
HCT VFR BLD CALC: 38.6 % — LOW (ref 39–50)
HGB BLD-MCNC: 12.9 G/DL — LOW (ref 13–17)
HGB BLD-MCNC: 13 G/DL — SIGNIFICANT CHANGE UP (ref 13–17)
HGB FLD-MCNC: 11.7 G/DL — LOW (ref 12.6–17.4)
HGB FLD-MCNC: 11.7 G/DL — LOW (ref 12.6–17.4)
INR BLD: 1 RATIO — SIGNIFICANT CHANGE UP (ref 0.85–1.18)
LYMPHOCYTES # BLD AUTO: 1.49 K/UL — SIGNIFICANT CHANGE UP (ref 1–3.3)
LYMPHOCYTES # BLD AUTO: 28.3 % — SIGNIFICANT CHANGE UP (ref 13–44)
MAGNESIUM SERPL-MCNC: 1.8 MG/DL — SIGNIFICANT CHANGE UP (ref 1.6–2.6)
MAGNESIUM SERPL-MCNC: 1.8 MG/DL — SIGNIFICANT CHANGE UP (ref 1.6–2.6)
MANUAL SMEAR VERIFICATION: SIGNIFICANT CHANGE UP
MCHC RBC-ENTMCNC: 29.7 PG — SIGNIFICANT CHANGE UP (ref 27–34)
MCHC RBC-ENTMCNC: 30.1 PG — SIGNIFICANT CHANGE UP (ref 27–34)
MCHC RBC-ENTMCNC: 33.7 GM/DL — SIGNIFICANT CHANGE UP (ref 32–36)
MCHC RBC-ENTMCNC: 33.8 GM/DL — SIGNIFICANT CHANGE UP (ref 32–36)
MCV RBC AUTO: 88 FL — SIGNIFICANT CHANGE UP (ref 80–100)
MCV RBC AUTO: 89.4 FL — SIGNIFICANT CHANGE UP (ref 80–100)
MONOCYTES # BLD AUTO: 0.09 K/UL — SIGNIFICANT CHANGE UP (ref 0–0.9)
MONOCYTES NFR BLD AUTO: 1.8 % — LOW (ref 2–14)
NEUTROPHILS # BLD AUTO: 3.64 K/UL — SIGNIFICANT CHANGE UP (ref 1.8–7.4)
NEUTROPHILS NFR BLD AUTO: 63.7 % — SIGNIFICANT CHANGE UP (ref 43–77)
NEUTS BAND # BLD: 5.3 % — SIGNIFICANT CHANGE UP (ref 0–8)
NRBC # BLD: 0 /100 WBCS — SIGNIFICANT CHANGE UP (ref 0–0)
NT-PROBNP SERPL-SCNC: 2412 PG/ML — HIGH (ref 0–300)
OXYHGB MFR BLDMV: 70 % — LOW (ref 90–95)
OXYHGB MFR BLDMV: 70.2 % — LOW (ref 90–95)
PHOSPHATE SERPL-MCNC: 3.4 MG/DL — SIGNIFICANT CHANGE UP (ref 2.5–4.5)
PLAT MORPH BLD: NORMAL — SIGNIFICANT CHANGE UP
PLATELET # BLD AUTO: 226 K/UL — SIGNIFICANT CHANGE UP (ref 150–400)
PLATELET # BLD AUTO: 232 K/UL — SIGNIFICANT CHANGE UP (ref 150–400)
POTASSIUM SERPL-MCNC: 3.9 MMOL/L — SIGNIFICANT CHANGE UP (ref 3.5–5.3)
POTASSIUM SERPL-MCNC: 4 MMOL/L — SIGNIFICANT CHANGE UP (ref 3.5–5.3)
POTASSIUM SERPL-SCNC: 3.9 MMOL/L — SIGNIFICANT CHANGE UP (ref 3.5–5.3)
POTASSIUM SERPL-SCNC: 4 MMOL/L — SIGNIFICANT CHANGE UP (ref 3.5–5.3)
PROT SERPL-MCNC: 6.7 G/DL — SIGNIFICANT CHANGE UP (ref 6–8.3)
PROT SERPL-MCNC: 6.7 G/DL — SIGNIFICANT CHANGE UP (ref 6–8.3)
PROTHROM AB SERPL-ACNC: 10.5 SEC — SIGNIFICANT CHANGE UP (ref 9.5–13)
RBC # BLD: 4.32 M/UL — SIGNIFICANT CHANGE UP (ref 4.2–5.8)
RBC # BLD: 4.34 M/UL — SIGNIFICANT CHANGE UP (ref 4.2–5.8)
RBC # FLD: 14.6 % — HIGH (ref 10.3–14.5)
RBC # FLD: 14.7 % — HIGH (ref 10.3–14.5)
RBC BLD AUTO: SIGNIFICANT CHANGE UP
RH IG SCN BLD-IMP: POSITIVE — SIGNIFICANT CHANGE UP
SAO2 % BLD: 70.9 % — SIGNIFICANT CHANGE UP (ref 60–90)
SAO2 % BLD: 70.9 % — SIGNIFICANT CHANGE UP (ref 60–90)
SODIUM SERPL-SCNC: 132 MMOL/L — LOW (ref 135–145)
SODIUM SERPL-SCNC: 133 MMOL/L — LOW (ref 135–145)
TACROLIMUS SERPL-MCNC: 22.2 NG/ML — SIGNIFICANT CHANGE UP
WBC # BLD: 4.89 K/UL — SIGNIFICANT CHANGE UP (ref 3.8–10.5)
WBC # BLD: 5.27 K/UL — SIGNIFICANT CHANGE UP (ref 3.8–10.5)
WBC # FLD AUTO: 4.89 K/UL — SIGNIFICANT CHANGE UP (ref 3.8–10.5)
WBC # FLD AUTO: 5.27 K/UL — SIGNIFICANT CHANGE UP (ref 3.8–10.5)

## 2024-02-20 PROCEDURE — 85027 COMPLETE CBC AUTOMATED: CPT

## 2024-02-20 PROCEDURE — 83735 ASSAY OF MAGNESIUM: CPT

## 2024-02-20 PROCEDURE — 88346 IMFLUOR 1ST 1ANTB STAIN PX: CPT

## 2024-02-20 PROCEDURE — C1894: CPT

## 2024-02-20 PROCEDURE — 71045 X-RAY EXAM CHEST 1 VIEW: CPT | Mod: 26

## 2024-02-20 PROCEDURE — 93505 ENDOMYOCARDIAL BIOPSY: CPT | Mod: 26

## 2024-02-20 PROCEDURE — 71250 CT THORAX DX C-: CPT

## 2024-02-20 PROCEDURE — 86850 RBC ANTIBODY SCREEN: CPT

## 2024-02-20 PROCEDURE — 93005 ELECTROCARDIOGRAM TRACING: CPT

## 2024-02-20 PROCEDURE — 85730 THROMBOPLASTIN TIME PARTIAL: CPT

## 2024-02-20 PROCEDURE — 88307 TISSUE EXAM BY PATHOLOGIST: CPT | Mod: 26

## 2024-02-20 PROCEDURE — 93306 TTE W/DOPPLER COMPLETE: CPT

## 2024-02-20 PROCEDURE — 82962 GLUCOSE BLOOD TEST: CPT

## 2024-02-20 PROCEDURE — 99222 1ST HOSP IP/OBS MODERATE 55: CPT

## 2024-02-20 PROCEDURE — 99152 MOD SED SAME PHYS/QHP 5/>YRS: CPT

## 2024-02-20 PROCEDURE — 85025 COMPLETE CBC W/AUTO DIFF WBC: CPT

## 2024-02-20 PROCEDURE — 93010 ELECTROCARDIOGRAM REPORT: CPT

## 2024-02-20 PROCEDURE — 71045 X-RAY EXAM CHEST 1 VIEW: CPT

## 2024-02-20 PROCEDURE — 88307 TISSUE EXAM BY PATHOLOGIST: CPT

## 2024-02-20 PROCEDURE — 71250 CT THORAX DX C-: CPT | Mod: 26

## 2024-02-20 PROCEDURE — 83880 ASSAY OF NATRIURETIC PEPTIDE: CPT

## 2024-02-20 PROCEDURE — 80053 COMPREHEN METABOLIC PANEL: CPT

## 2024-02-20 PROCEDURE — 88346 IMFLUOR 1ST 1ANTB STAIN PX: CPT | Mod: 26

## 2024-02-20 PROCEDURE — 86900 BLOOD TYPING SEROLOGIC ABO: CPT

## 2024-02-20 PROCEDURE — 94640 AIRWAY INHALATION TREATMENT: CPT

## 2024-02-20 PROCEDURE — 99223 1ST HOSP IP/OBS HIGH 75: CPT | Mod: 25

## 2024-02-20 PROCEDURE — ZZZZZ: CPT

## 2024-02-20 PROCEDURE — 93505 ENDOMYOCARDIAL BIOPSY: CPT

## 2024-02-20 PROCEDURE — 93308 TTE F-UP OR LMTD: CPT | Mod: 26

## 2024-02-20 PROCEDURE — 80197 ASSAY OF TACROLIMUS: CPT

## 2024-02-20 PROCEDURE — 86901 BLOOD TYPING SEROLOGIC RH(D): CPT

## 2024-02-20 PROCEDURE — 84100 ASSAY OF PHOSPHORUS: CPT

## 2024-02-20 PROCEDURE — 85610 PROTHROMBIN TIME: CPT

## 2024-02-20 PROCEDURE — C1887: CPT

## 2024-02-20 PROCEDURE — 82803 BLOOD GASES ANY COMBINATION: CPT

## 2024-02-20 RX ORDER — INSULIN GLARGINE 100 [IU]/ML
30 INJECTION, SOLUTION SUBCUTANEOUS AT BEDTIME
Refills: 0 | Status: DISCONTINUED | OUTPATIENT
Start: 2024-02-20 | End: 2024-02-20

## 2024-02-20 RX ORDER — INSULIN LISPRO 100/ML
VIAL (ML) SUBCUTANEOUS
Refills: 0 | Status: DISCONTINUED | OUTPATIENT
Start: 2024-02-20 | End: 2024-03-05

## 2024-02-20 RX ORDER — NIFEDIPINE 30 MG
60 TABLET, EXTENDED RELEASE 24 HR ORAL EVERY 12 HOURS
Refills: 0 | Status: DISCONTINUED | OUTPATIENT
Start: 2024-02-20 | End: 2024-02-20

## 2024-02-20 RX ORDER — INSULIN LISPRO 100/ML
VIAL (ML) SUBCUTANEOUS
Refills: 0 | Status: DISCONTINUED | OUTPATIENT
Start: 2024-02-20 | End: 2024-02-20

## 2024-02-20 RX ORDER — DEXTROSE 50 % IN WATER 50 %
12.5 SYRINGE (ML) INTRAVENOUS ONCE
Refills: 0 | Status: DISCONTINUED | OUTPATIENT
Start: 2024-02-20 | End: 2024-03-05

## 2024-02-20 RX ORDER — FLUCONAZOLE 150 MG/1
100 TABLET ORAL DAILY
Refills: 0 | Status: DISCONTINUED | OUTPATIENT
Start: 2024-02-20 | End: 2024-02-20

## 2024-02-20 RX ORDER — GLUCAGON INJECTION, SOLUTION 0.5 MG/.1ML
1 INJECTION, SOLUTION SUBCUTANEOUS ONCE
Refills: 0 | Status: DISCONTINUED | OUTPATIENT
Start: 2024-02-20 | End: 2024-03-05

## 2024-02-20 RX ORDER — BUDESONIDE AND FORMOTEROL FUMARATE DIHYDRATE 160; 4.5 UG/1; UG/1
2 AEROSOL RESPIRATORY (INHALATION)
Refills: 0 | Status: DISCONTINUED | OUTPATIENT
Start: 2024-02-20 | End: 2024-02-20

## 2024-02-20 RX ORDER — DEXTROSE 50 % IN WATER 50 %
25 SYRINGE (ML) INTRAVENOUS ONCE
Refills: 0 | Status: DISCONTINUED | OUTPATIENT
Start: 2024-02-20 | End: 2024-03-05

## 2024-02-20 RX ORDER — VALGANCICLOVIR 450 MG/1
450 TABLET, FILM COATED ORAL EVERY 12 HOURS
Refills: 0 | Status: DISCONTINUED | OUTPATIENT
Start: 2024-02-20 | End: 2024-02-20

## 2024-02-20 RX ORDER — OLANZAPINE 15 MG/1
5 TABLET, FILM COATED ORAL AT BEDTIME
Refills: 0 | Status: DISCONTINUED | OUTPATIENT
Start: 2024-02-20 | End: 2024-02-20

## 2024-02-20 RX ORDER — SODIUM CHLORIDE 9 MG/ML
3 INJECTION INTRAMUSCULAR; INTRAVENOUS; SUBCUTANEOUS EVERY 8 HOURS
Refills: 0 | Status: DISCONTINUED | OUTPATIENT
Start: 2024-02-20 | End: 2024-02-20

## 2024-02-20 RX ORDER — MYCOPHENOLATE MOFETIL 250 MG/1
1000 CAPSULE ORAL
Refills: 0 | Status: DISCONTINUED | OUTPATIENT
Start: 2024-02-20 | End: 2024-02-20

## 2024-02-20 RX ORDER — TACROLIMUS 5 MG/1
7 CAPSULE ORAL
Refills: 0 | Status: DISCONTINUED | OUTPATIENT
Start: 2024-02-20 | End: 2024-02-20

## 2024-02-20 RX ORDER — DEXTROSE 50 % IN WATER 50 %
15 SYRINGE (ML) INTRAVENOUS ONCE
Refills: 0 | Status: DISCONTINUED | OUTPATIENT
Start: 2024-02-20 | End: 2024-03-05

## 2024-02-20 RX ORDER — TACROLIMUS 5 MG/1
8 CAPSULE ORAL
Refills: 0 | Status: DISCONTINUED | OUTPATIENT
Start: 2024-02-20 | End: 2024-02-20

## 2024-02-20 RX ORDER — COLCHICINE 0.6 MG
0.6 TABLET ORAL EVERY 12 HOURS
Refills: 0 | Status: DISCONTINUED | OUTPATIENT
Start: 2024-02-20 | End: 2024-02-20

## 2024-02-20 RX ORDER — SODIUM CHLORIDE 9 MG/ML
1000 INJECTION, SOLUTION INTRAVENOUS
Refills: 0 | Status: DISCONTINUED | OUTPATIENT
Start: 2024-02-20 | End: 2024-03-05

## 2024-02-20 RX ORDER — INSULIN LISPRO 100/ML
VIAL (ML) SUBCUTANEOUS AT BEDTIME
Refills: 0 | Status: DISCONTINUED | OUTPATIENT
Start: 2024-02-20 | End: 2024-03-05

## 2024-02-20 RX ORDER — INSULIN LISPRO 100/ML
17 VIAL (ML) SUBCUTANEOUS
Refills: 0 | Status: DISCONTINUED | OUTPATIENT
Start: 2024-02-20 | End: 2024-02-20

## 2024-02-20 RX ORDER — ATORVASTATIN CALCIUM 80 MG/1
20 TABLET, FILM COATED ORAL AT BEDTIME
Refills: 0 | Status: DISCONTINUED | OUTPATIENT
Start: 2024-02-20 | End: 2024-02-20

## 2024-02-20 RX ORDER — COLCHICINE 0.6 MG
0.6 TABLET ORAL DAILY
Refills: 0 | Status: DISCONTINUED | OUTPATIENT
Start: 2024-02-20 | End: 2024-02-20

## 2024-02-20 RX ORDER — PANTOPRAZOLE SODIUM 20 MG/1
40 TABLET, DELAYED RELEASE ORAL
Refills: 0 | Status: DISCONTINUED | OUTPATIENT
Start: 2024-02-20 | End: 2024-02-20

## 2024-02-20 RX ORDER — HYDRALAZINE HCL 50 MG
25 TABLET ORAL EVERY 8 HOURS
Refills: 0 | Status: DISCONTINUED | OUTPATIENT
Start: 2024-02-20 | End: 2024-02-20

## 2024-02-20 RX ORDER — ASPIRIN/CALCIUM CARB/MAGNESIUM 324 MG
81 TABLET ORAL DAILY
Refills: 0 | Status: DISCONTINUED | OUTPATIENT
Start: 2024-02-20 | End: 2024-02-20

## 2024-02-20 RX ORDER — INSULIN LISPRO 100/ML
15 VIAL (ML) SUBCUTANEOUS
Refills: 0 | Status: DISCONTINUED | OUTPATIENT
Start: 2024-02-20 | End: 2024-02-20

## 2024-02-20 RX ORDER — MAGNESIUM OXIDE 400 MG ORAL TABLET 241.3 MG
800 TABLET ORAL
Refills: 0 | Status: DISCONTINUED | OUTPATIENT
Start: 2024-02-20 | End: 2024-02-20

## 2024-02-20 RX ORDER — INSULIN LISPRO 100/ML
20 VIAL (ML) SUBCUTANEOUS
Refills: 0 | Status: DISCONTINUED | OUTPATIENT
Start: 2024-02-20 | End: 2024-02-20

## 2024-02-20 RX ADMIN — Medication 4: at 08:48

## 2024-02-20 RX ADMIN — Medication 5: at 18:14

## 2024-02-20 NOTE — PHYSICAL EXAM
[Alert] : alert [No Acute Distress] : no acute distress [Well Nourished] : well nourished [Well Developed] : well developed [Healthy Appearance] : healthy appearance [Normal Voice/Communication] : normal voice communication [No Respiratory Distress] : no respiratory distress [Oriented x3] : oriented to person, place, and time [Normal Insight/Judgement] : insight and judgment were intact [Normal Affect] : the affect was normal [Normal Mood] : the mood was normal [Recent Memory Normal] : recent memory was not impaired [Remote Memory Normal] : remote memory was not impaired

## 2024-02-20 NOTE — ASU PATIENT PROFILE, ADULT - FALL HARM RISK - UNIVERSAL INTERVENTIONS
Bed in lowest position, wheels locked, appropriate side rails in place/Call bell, personal items and telephone in reach/Instruct patient to call for assistance before getting out of bed or chair/Non-slip footwear when patient is out of bed/[place] to call system/Physically safe environment - no spills, clutter or unnecessary equipment/Purposeful Proactive Rounding/Room/bathroom lighting operational, light cord in reach

## 2024-02-20 NOTE — ASU DISCHARGE PLAN (ADULT/PEDIATRIC) - CARE PROVIDER_API CALL
Gm Martins  [facility]  [address] [place] [id number]  Phone: [phone]  Fax: [phone]  Established Patient  Follow Up Time:

## 2024-02-20 NOTE — H&P ADULT - HISTORY OF PRESENT ILLNESS
57 yo M with iCMP s/p HTN, DMT2 (A1c 8.3%) and CVA s/p TPA (2018), s/p OHT 12/25/2024. Since discharge, with serial EMBx obtained (1/2, 1/9, 1/16, 2/6, 2/20). Notably, a moderate pericardial effusion was noted, 1.3cm adjacent to the RV, was first noted on interval TTE 1/16. 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   S/p another RHC with EMBx with elevated filling pressures with preserved CO/CI in the setting of moderate pericardial effusion. Admitted inpatient for purpose of drainage.

## 2024-02-20 NOTE — CONSULT NOTE ADULT - PROBLEM SELECTOR RECOMMENDATION 9
s/p Heart transplant 12/25/23  - currently off diuretics, would not diurese as may precipitate tamponade   - TTE 2/20 demonstrated normal biventricular function   - Endomyocardial biopsy 2/6 with mild cellular rejection; f/u 2/20 EMBx. Continue prednisone 7.5mg bid, cellcept 1g bid, tacro 7mg qAM, 8mg qPM for now.  - F/u Tacro Lvl (goal 12-14)  - Continue ID ppx meds including fluc 100mg daily, bactrim 400/80 daily, valcyte 450 bid   - c/w ASA 81mg  - c/w statin  - Replete K/Mg 4/2 respectively s/p Heart transplant 12/25/23  - currently off diuretics, would not diurese as may precipitate tamponade   - TTE 2/20 demonstrated normal biventricular function   - Endomyocardial biopsy 2/6 with mild cellular rejection; f/u 2/20 EMBx. Continue home regimen prednisone/cellcept/tacro for now   - F/u Tacro Lvl (goal 12-14)  - Continue ID ppx meds including fluc 100mg daily, bactrim 400/80 daily, valcyte 450 bid   - c/w ASA 81mg  - c/w statin  - Replete K/Mg 4/2 respectively

## 2024-02-20 NOTE — H&P ADULT - NSHPPHYSICALEXAM_GEN_ALL_CORE
General: WN/WD NAD  Neurology: A&Ox3, nonfocal, MOISE x 4  Head:  Normocephalic, atraumatic  ENT:  Mucosa moist, no ulcerations  Neck:  Supple, no sinuses or palpable masses  Lymphatic:  No palpable cervical, supraclavicular, axillary or inguinal adenopathy  Respiratory: CTA B/L  CV: RRR, S1S2, no murmur  Abdominal: Soft, NT, ND no palpable mass  MSK: No edema, + peripheral pulses, FROM all 4 extremity

## 2024-02-20 NOTE — PATIENT PROFILE ADULT - FALL HARM RISK - HARM RISK INTERVENTIONS

## 2024-02-20 NOTE — CONSULT NOTE ADULT - SUBJECTIVE AND OBJECTIVE BOX
Interventional Radiology    Evaluate for Procedure: Pericardial effusion drainage    HPI: 59 yo M with iCMP s/p HTN, DMT2 (A1c 8.3%) and CVA s/p TPA (2018), s/p OHT 12/25/2024. Since discharge, with serial EMBx obtained (1/2, 1/9, 1/16, 2/6, 2/20). Notably, a moderate pericardial effusion was noted, 1.3cm adjacent to the RV, was first noted on interval TTE 1/16. 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 S/p another RHC with EMBx with elevated filling pressures with preserved CO/CI in the setting of moderate pericardial effusion. Admitted inpatient for purpose of drainage. IR consulted for pericardial effusion drainage.     Allergies: penicillins (Unknown)    Medications (Abx/Cardiac/Anticoagulation/Blood Products)    Data:  170.2, 170.2  84.5, 81.6  T(C): 36.3  HR: 106  BP: 132/84  RR: 18  SpO2: 97%    -WBC 4.89 / HgB 13.0 / Hct 38.6 / Plt 226  -Na 133 / Cl 96 / BUN 29 / Glucose 321  -K 4.0 / CO2 24 / Cr 1.34  -ALT 26 / Alk Phos 63 / T.Bili 0.2  -INR 1.00 / PTT 25.5    Radiology:     Assessment/Plan: 59 yo M with iCMP s/p HTN, DMT2 (A1c 8.3%) and CVA s/p TPA (2018), s/p OHT 12/25/2024. Since discharge, with serial EMBx obtained (1/2, 1/9, 1/16, 2/6, 2/20). Notably, a moderate pericardial effusion was noted, 1.3cm adjacent to the RV, was first noted on interval TTE 1/16. 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 S/p another RHC with EMBx with elevated filling pressures with preserved CO/CI in the setting of moderate pericardial effusion. Admitted inpatient for purpose of drainage. IR consulted for pericardial effusion drainage.     -Ct reviewed with Dr. Valente. No safe percutaneous window for drainage.   -Continue global care per primary team  -Above d/w primary team  -Please contact IR at [phone] with any questions/ concerns regarding above.

## 2024-02-20 NOTE — CONSULT NOTE ADULT - PROBLEM SELECTOR RECOMMENDATION 3
- Has been fluctuating (1.62 > 1.14 > 1.34) today  - If persistent elevation can consider atovaquone over bactrim   - Do not recommend diuretics, fluids PRN hypotension   - I/Os, daily STANDING weights, trend Cre

## 2024-02-20 NOTE — ASU DISCHARGE PLAN (ADULT/PEDIATRIC) - ASU DC SPECIAL INSTRUCTIONSFT

## 2024-02-20 NOTE — CONSULT NOTE ADULT - ASSESSMENT
59 yo M with iCMP s/p HTN, DMT2 (A1c 8.3%) and CVA s/p TPA (), s/p OHT 2024. Since discharge, with serial EMBx obtained (, , , , ). Notably, a moderate pericardial effusion was noted, 1.3cm adjacent to the RV, was first noted on interval TTE . 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   S/p another RHC with EMBx with elevated filling pressures with preserved CO/CI in the setting of moderate pericardial effusion. Admitted inpatient for purpose of drainage.     Cardiac Studies:   EC24: unchanged from prior   TTE 24: ST 102bpm, MAGDA, right axis   TTE 24: CONCLUSIONS: Nml biV function, consider effusive constrictive pericaridits.  criterial annulus reversus and >30% trans jhonny flow variation. however IVC not dilated, no septal bounce. Moderate pericardial effusion,  thickened pericardium.  TTE : Nml BiV function, A pericardial effusion without tamponade is seen measuring 1.3cm adjacent to RV   TTE , : normal biventricular function  Cardiac Cath/PCI:   24: RHC/EMBx Ao 131/88, RA 10 RV 28/13, PA 33/23/22, PCWP 21; PAsat 71, Ao sat 100, CO/CI 5.59/2.90, SVR 1358 PVR 3.93   24: RHC/EMBx RA 6 PAP 32// PCW 22 Pasat 70% Juan CO/CI 5.7/2.9, EMBx ISHLT Grade 1R/1A, CD4 negative  24: RHC/EMBx ISHLT Grade 1R/1A  24: RHC/EMBx RA 4 PAP 26/10/ PCW 13 PaSat 63% Juan CO/CI 6/3.1, EMBx ISHLT grade 0; AMR 0  24: RHC/EMBx: RA 5, RV 19/6/8, PA //17, PCWP 15, PA sat 68.9, EMBx ISHLT Grade 0R  24: RHC/EMBx: RA 4 PAP 20/15 PCW 15 PaSat 66% Juan CO/CI EMBx ISHLT Grade 0R

## 2024-02-20 NOTE — H&P ADULT - NSHPREVIEWOFSYSTEMS_GEN_ALL_CORE
REVIEW OF SYSTEMS:  CONSTITUTIONAL: No fever, weight loss, or fatigue  EYES: No eye pain, visual disturbances, or discharge  ENMT:  No difficulty hearing, tinnitus, vertigo; No sinus or throat pain  NECK: No pain or stiffness  RESPIRATORY: Has non productive cough, No wheezing, chills or hemoptysis; No shortness of breath  CARDIOVASCULAR: No chest pain, No palpitations, dizziness, or leg swelling  GASTROINTESTINAL: No abdominal or epigastric pain. No nausea, vomiting, or hematemesis; No diarrhea ,has No melena  GENITOURINARY: No dysuria, frequency, hematuria, or incontinence  NEUROLOGICAL: No headaches, memory loss, loss of strength, numbness, or tremors  SKIN: No itching, burning, rashes, or lesions   LYMPH NODES: No enlarged glands  ENDOCRINE: No heat or cold intolerance; No hair loss  MUSCULOSKELETAL: No joint pain or swelling; No muscle, back, or extremity pain  PSYCHIATRIC: No depression, anxiety, mood swings, or difficulty sleeping  HEME/LYMPH: No easy bruising, or bleeding gums  ALLERGY: No hives or eczema

## 2024-02-20 NOTE — H&P ADULT - NSHPLABSRESULTS_GEN_ALL_CORE
< From: TTE W or WO Ultrasound Enhancing Agent (02.20.24 @ 09:43) >       CONCLUSIONS:      1. Left ventricular systolic function is normal.   2. Normal right ventricular cavity size and reduced systolic function.   3. Trace tricuspid regurgitation.   4. This is a limited study for RV biopsy.      At baseline there is a moderate pericardial effusion without signs of cardiac tamponade.      Trace tricuspid valve regurgitation.      RV is normal size and decreased in function.   5. Post RV biopsy:      There is no change in the size of the pericardial effusion.      RV size and function are unchanged.      The severity of TR is also unchanged.    ________________________________________________________________________________________  FINDINGS:     Left Ventricle:  Left ventricular wall thickness is normal. Left ventricular systolic function is normal.     Right Ventricle:  The right ventricular cavity is normal in size and reduced systolic function.     Tricuspid Valve:  Structurally normal tricuspid valve with normal leaflet excursion. There is trace tricuspid regurgitation.     Pericardium:  There is a moderate pericardial effusion.     Systemic Veins:  The inferior vena cava is normal in size measuring 1.00 cm in diameter, (normal <2.1cm) with abnormalinspiratory collapse (abnormal <50%) consistent with mildly elevated right atrial pressure (~8, range 5-10mmHg).  ____________________________________________________________________  QUANTITATIVE DATA:     Tricuspid Valve Measurements:     RA Pressure: 8 mmHg    ________________________________________________________________________________________  Electronically signed on 2/20/2024 at 10:24:48 AM by Prabha Bauer

## 2024-02-20 NOTE — H&P ADULT - ASSESSMENT
59 yo M with iCMP s/p HTN, DMT2 (A1c 8.3%) and CVA s/p TPA (2018), s/p OHT 12/25/2024. Since discharge, with serial EMBx obtained (1/2, 1/9, 1/16, 2/6, 2/20). Notably, a moderate pericardial effusion was noted, 1.3cm adjacent to the RV, was first noted on interval TTE 1/16. 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   S/p another RHC with EMBx with elevated filling pressures with preserved CO/CI in the setting of moderate pericardial effusion. Admitted inpatient for purpose of drainage.

## 2024-02-20 NOTE — CONSULT NOTE ADULT - SUBJECTIVE AND OBJECTIVE BOX
CARDIOLOGY FELLOW CONSULT NOTE    HPI:  59 yo M with HFrEF (LVIDd 6.4 cm, LVEF 32%), CAD s/p PCI (2008), HTN, DMT2 (A1c 8.3%) and CVA s/p TPA (2018). He has a protracted ICU hospitalization.     recently treated for PNA who initially presented to [facility] via EMS after syncope reportedly requiring defibrilation. Treated for ACS but left AMA to come to [facility]. On arrival ECG with ST depression in lateral leads. Intubated 11/1 for respiratory failure. Found to have COVID. L/RHC 11/1revealing  of LAD and RCA, elevated filling pressures and CI of 1.5 prompting placement of IABP. Extubated 11/3. IABP was weaned to off 11/7, then the following day on 11/8, developed PMVT cardiac arrest with prompt CPR and defibrillation, started on IV Lidocaine and Amio. IABP ultimately replaced on 11/9 for worsening hypotension. TTE 11/11 revealing LV thrombus.   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 AT evaluation launched 11/10, he is ABO A. He was discussed during TSC on 11/16 and was approved for listing, however was found to be Bacteremic with 11/17 Blc Cx growing mixed cultures Staph epi and Enterococcus faecalis and started on IV Vanco. Repeat cultures from 11/18 reveal NGTD and therefore was cleared by ID for listing.   GM + rods (Cutibacterium) in blood culture on 11/30 (reported on 12/4), restarted on vancomycin, and status 7, repeat cultures negative x48 hours (12/6).    On 12/25/23, pt underwent OHT, with IABP removed 12/26, Sabrina and Epi weaned off 12/26. Discharged beginning of January to home. RHC and endomyocardial biopsies obtained 1/2, 1/9, 1/16 (TTE moderate pericardial effusion, 1.3 cm adjacent to RV first noted), 1/23.   Had his most recent RHC/EMBx 2/6, with RV endomyocardial biopsy with 5 samples obtained. C/w mild acute cellular rejection, ISHLT grade 1R. Immunofluorescence for C4d is negative.    Notably, a TTE obtained 2/14 raising suspicion for effusive constrictive pericarditis, with 2/4 criteria met with annulus reversus and >30% transmitral flow variation, but IVC not dilated and no septal bounce.   2/20: Ao 131/88, RV 28/13, PA 33/23/22, PCWP 21; PAsat 71, Ao sat 100, CO/CI 5.59/2.90, SVR 1358 PVR 3.93   Admitted for CT imaging and effusion drainage.     PMHx:   HTN (hypertension)  CAD (coronary artery disease)  Intracranial hemorrhage  Respiratory arrest  Myocardial infarction, unspecified MI type, unspecified artery    PSHx:   History of coronary artery stent placement  H/O heart transplant    Allergies:  penicillins (Unknown)    Home Meds:    Current Medications:     FAMILY HISTORY:  Family history of meningitis (Sibling)  Brother, death at age 49 from complications of infection (June 2015)    Family history of hypertension in mother  Mother    Social History:  Smoking History:  Alcohol Use:  Drug Use:    REVIEW OF SYSTEMS:  CONSTITUTIONAL: No weakness, fevers or chills  EYES/ENT: No visual changes;  No dysphagia  NECK: No pain or stiffness  RESPIRATORY: No cough, wheezing, hemoptysis; No shortness of breath  CARDIOVASCULAR: No chest pain or palpitations; No lower extremity edema  GASTROINTESTINAL: No abdominal or epigastric pain. No nausea, vomiting, or hematemesis; No diarrhea or constipation. No melena or hematochezia.  BACK: No back pain  GENITOURINARY: No dysuria, frequency or hematuria  NEUROLOGICAL: No numbness or weakness  SKIN: No itching, burning, rashes, or lesions   All other review of systems is negative unless indicated above.    Physical Exam:  T(F): 97.3 (02-20), Max: 97.5 (02-20)  HR: 106 (02-20) (91 - 106)  BP: 132/84 (02-20) (120/86 - 146/94)  RR: 18 (02-20)  SpO2: 97% (02-20)  GENERAL: No acute distress, well-developed  HEAD:  Atraumatic, Normocephalic  ENT: EOMI, PERRLA, conjunctiva and sclera clear, Neck supple, No JVD, moist mucosa  CHEST/LUNG: Clear to auscultation bilaterally; No wheeze, equal breath sounds bilaterally   BACK: No spinal tenderness  HEART: Regular rate and rhythm; No murmurs, rubs, or gallops  ABDOMEN: Soft, Nontender, Nondistended; Bowel sounds present  EXTREMITIES:  No clubbing, cyanosis, or edema  PSYCH: Nl behavior, nl affect  NEUROLOGY: AAOx3, non-focal, cranial nerves intact  SKIN: Normal color, No rashes or lesions  LINES:    Labs: Personally reviewed                        12.9   5.27  )-----------( 232      ( 20 Feb 2024 08:49 )             38.2     02-20    132<L>  |  94<L>  |  29<H>  ----------------------------<  313<H>  3.9   |  21<L>  |  1.14    Ca    9.8      20 Feb 2024 08:49  Mg     1.8     02-20    TPro  6.7  /  Alb  4.3  /  TBili  0.2  /  DBili  x   /  AST  34  /  ALT  22  /  AlkPhos  61  02-20 CARDIOLOGY FELLOW CONSULT NOTE    HPI:  59 yo M with HFrEF (LVIDd 6.4 cm, LVEF 32%), CAD s/p PCI (), HTN, DMT2 (A1c 8.3%) and CVA s/p TPA (), s/p OHT 2024.  He has a protracted ICU hospitalization pre-transplant, characterized by STEMI with L/RHC revealing  of LAD/RCA, low flow with CI 1.5 for which he required IABP support. He was started on GDMT and weaned from IABP and almost immediately after downgrade from ICU developed PMVT requiring ACLS, re-placement of IABP. Post arrest TTE with LV thrombus. AT eval initiated, with stuttering course thereafter including Staph epi and E. faecalis and cutibacterium bacteremia requiring ax.   On 23, pt underwent OHT, with IABP removed , Sabrina and Epi weaned off . Discharged beginning of January to home.   RHC and endomyocardial biopsies obtained , ,  (TTE first noted moderate pericardial effusion, 1.3 cm adjacent to RV first noted), .   Had his most recent RHC/EMBx , with RV endomyocardial biopsy with 5 samples obtained. C/w mild acute cellular rejection, ISHLT grade 1R. \  An interval TTE obtained  showed stable size of effusion, but raised suspicion for effusive constrictive pericarditis, with 2/4 criteria met with annulus reversus and >30% transmitral flow variation, but IVC not dilated and no septal bounce.     :   Seen in outpatient clinic, he continues to report b/l LE weakness walking up stairs. He's able to ambulate around the home and progressing his activity as much as possible. Home VS log revealed normotensive at home, weight stable 179-190 lbs, FS still vary 's mg/dL, taking insulin as prescribed and is being followed by endocrine. Reports he still doesn't have full sense of taste but appetite is good. Sleeping well. Currently denied SOB, CP, palpitations, N/V/D, fever, chills or LE edema. Pt reports 100% compliance with medications.  RHC completed showing: Ao 131/88, RV 28/13, PA 33/23/22, PCWP 21; PAsat 71, Ao sat 100, CO/CI 5.59/2.90, SVR 1358 PVR 3.93   Admitted for CT imaging and effusion drainage.     Outpatient meds:   CV:  ?? Aspirin 81 MG Oral Tablet Delayed Release; Take 1 tablet daily  ?? Rosuvastatin Calcium 5 MG Oral Tablet; take 1 tablet at bedtime  ?? hydrALAZINE HCl - 25 MG Oral Tablet; TAKE 1 TABLET 3 times daily  ?? NIFEdipine ER Osmotic Release 60 MG Oral Tablet Extended Release 24 Hour; Take 1  tablet twice daily    Immunosuppressives:   ?? Mycophenolate Mofetil 250 MG Oral Capsule; TAKE 4 CAPSULE Twice daily  ?? PredniSONE 5 MG Oral Tablet; TAKE 1.5 TABLET Twice daily  ?? Tacrolimus 1 MG Oral Capsule; TAKE 2 CAPSULE every am and 3 CAPSULES every pm  ?? Tacrolimus 5 MG Oral Capsule; Take 1 capsule twice daily    ID ppx:   ?? Fluconazole 100 MG Oral Tablet; Take 1 tablet daily  ?? Sulfamethoxazole-Trimethoprim 400-80 MG Oral Tablet; Take 1 tablet daily  ?? valGANciclovir HCl - 450 MG Oral Tablet; Take 1 tablet twice daily    Vitamins/electrolytes:   ?? Calcium Citrate + D3 315-6.25 MG-MCG Oral Tablet; TAKE 2 TABLET Twice daily  ?? Dialyvite Oral Tablet; Take 1 tablet daily  ?? Lokelma 10 GM Oral Packet; TAKE 10 GM Daily PRN when instructed to do so  ?? Magnesium Oxide 400 MG Oral Tablet; TAKE 2 TABLET 3 times daily    Etc:   ?? OLANZapine 5 MG Oral Tablet; TAKE 1 TABLET AT BEDTIME  ?? Pantoprazole Sodium 40 MG Oral Tablet Delayed Release; TAKE 1 TABLET DAILY  ?? Symbicort 160-4.5 MCG/ACT Inhalation Aerosol; INHALE 1 PUFFS Twice daily    Cardiac Studies:   EC24: unchanged from prior   24: ST 102bpm, MAGDA, right axis   Echo: : CONCLUSIONS: Nml biV function, consider effusive constrictive pericaridits. / criterial annulus reversus and >30% trans jhonny flow variation. however IVC not dilated, no septal bounce. Moderate pericardial effusion,  thickened pericardium.  : Nml BiV function, A pericardial effusion without tamponade is seen measuring 1.3cm adjacent to RV   : normal biventricular function  1/2: normal biventricular function    Cardiac Cath/PCI: 24: RHC/EMBx RA 6 PAP // PCW 22 Pasat 70% Juan CO/CI 5.7/2.9, EMBx ISHLT Grade 1R/1A, CD4 negative  24: RHC/EMBx ISHLT Grade 1R/1A  24: RHC/EMBx RA 4 PAP 26/10/18 PCW 13 PaSat 63% Juan CO/CI 6/3.1, EMBx ISHLT grade 0; AMR 0  24: RHC/EMBx: RA 5, RV 19/6/8, PA 21//17, PCWP 15, PA sat 68.9, EMBx ISHLT Grade 0R  24: RHC/EMBx: RA 4 PAP //15 PCW 15 PaSat 66% Juan CO/CI EMBx ISHLT Grade 0R      PMHx:   HTN (hypertension)  CAD (coronary artery disease)  Intracranial hemorrhage  Respiratory arrest  Myocardial infarction, unspecified MI type, unspecified artery    PSHx:   History of coronary artery stent placement  H/O heart transplant    Allergies:  penicillins (Unknown)    FAMILY HISTORY:  Family history of meningitis (Sibling)  Brother, death at age 49 from complications of infection (2015)    Family history of hypertension in mother  Mother    REVIEW OF SYSTEMS:  CONSTITUTIONAL: No weakness, fevers or chills  EYES/ENT: No visual changes;  No dysphagia  NECK: No pain or stiffness  RESPIRATORY: No cough, wheezing, hemoptysis; No shortness of breath  CARDIOVASCULAR: No chest pain or palpitations; No lower extremity edema  GASTROINTESTINAL: No abdominal or epigastric pain. No nausea, vomiting, or hematemesis; No diarrhea or constipation. No melena or hematochezia.  BACK: No back pain  GENITOURINARY: No dysuria, frequency or hematuria  NEUROLOGICAL: No numbness or weakness  SKIN: No itching, burning, rashes, or lesions   All other review of systems is negative unless indicated above.    Physical Exam:  T(F): 97.3 (-), Max: 97.5 (-)  HR: 106 (-) (91 - 106)  BP: 132/84 (-) (120/86 - 146/94)  RR: 18 ()  SpO2: 97% ()  GENERAL: No acute distress, well-developed  HEAD:  Atraumatic, Normocephalic  ENT: EOMI, PERRLA, conjunctiva and sclera clear, Neck supple, No JVD, moist mucosa  CHEST/LUNG: Clear to auscultation bilaterally; No wheeze, equal breath sounds bilaterally   BACK: No spinal tenderness  HEART: Regular, no MRG, distant heart sounds   ABDOMEN: Soft, Nontender, Nondistended; Bowel sounds present  EXTREMITIES:  No clubbing, cyanosis, or edema  PSYCH: Nl behavior, nl affect  NEUROLOGY: AAOx3, non-focal, cranial nerves intact  SKIN: Normal color, No rashes or lesions    Labs: Personally reviewed                        12.9   5.27  )-----------( 232      ( 2024 08:49 )             38.2         132<L>  |  94<L>  |  29<H>  ----------------------------<  313<H>  3.9   |  21<L>  |  1.14    Ca    9.8      2024 08:49  Mg     1.8         TPro  6.7  /  Alb  4.3  /  TBili  0.2  /  DBili  x   /  AST  34  /  ALT  22  /  AlkPhos  61   CARDIOLOGY FELLOW CONSULT NOTE    HPI:  57 yo M with HFrEF (LVIDd 6.4 cm, LVEF 32%), CAD s/p PCI (), HTN, DMT2 (A1c 8.3%) and CVA s/p TPA (), s/p OHT 2024.  He has a protracted ICU hospitalization pre-transplant, characterized by STEMI with L/RHC revealing  of LAD/RCA, low flow with CI 1.5 for which he required IABP support. He was started on GDMT and weaned from IABP and almost immediately after downgrade from ICU developed PMVT requiring ACLS, re-placement of IABP. Post arrest TTE with LV thrombus. AT eval initiated, with stuttering course thereafter including Staph epi and E. faecalis and cutibacterium bacteremia requiring ax.   On 23, pt underwent OHT, with IABP removed , Sabrina and Epi weaned off . Discharged beginning of January to home.   RHC and endomyocardial biopsies obtained , ,  (TTE first noted moderate pericardial effusion, 1.3 cm adjacent to RV first noted), .   Had his most recent RHC/EMBx , with RV endomyocardial biopsy with 5 samples obtained. C/w mild acute cellular rejection, ISHLT grade 1R. \  An interval TTE obtained  showed stable size of effusion, but raised suspicion for effusive constrictive pericarditis, with 2/4 criteria met with annulus reversus and >30% transmitral flow variation, but IVC not dilated and no septal bounce.     :   Seen in outpatient clinic, he continues to report b/l LE weakness walking up stairs. He's able to ambulate around the home and progressing his activity as much as possible. Home VS log revealed normotensive at home, weight stable 179-190 lbs, FS still vary 's mg/dL, taking insulin as prescribed and is being followed by endocrine. Reports he still doesn't have full sense of taste but appetite is good. Sleeping well. Currently denied SOB, CP, palpitations, N/V/D, fever, chills or LE edema. Pt reports 100% compliance with medications.  RHC completed showing: Ao 131/88, RV 28/13, PA 33/23/22, PCWP 21; PAsat 71, Ao sat 100, CO/CI 5.59/2.90, SVR 1358 PVR 3.93   Admitted for CT imaging and effusion drainage.   AF  /84, satting 97% Ra  4.89 < 13 > 226; 133/4 | 96/24 | 29/1.34 < 321; 31/26 | 63 | 0.2 | 6.7/4.3; INR 1.00    Outpatient meds:   CV:  ?? Aspirin 81 MG Oral Tablet Delayed Release; Take 1 tablet daily  ?? Rosuvastatin Calcium 5 MG Oral Tablet; take 1 tablet at bedtime  ?? hydrALAZINE HCl - 25 MG Oral Tablet; TAKE 1 TABLET 3 times daily  ?? NIFEdipine ER Osmotic Release 60 MG Oral Tablet Extended Release 24 Hour; Take 1  tablet twice daily    Immunosuppressives:   ?? Mycophenolate Mofetil 250 MG Oral Capsule; TAKE 4 CAPSULE Twice daily  ?? PredniSONE 5 MG Oral Tablet; TAKE 1.5 TABLET Twice daily  ?? Tacrolimus 1 MG Oral Capsule; TAKE 2 CAPSULE every am and 3 CAPSULES every pm  ?? Tacrolimus 5 MG Oral Capsule; Take 1 capsule twice daily    ID ppx:   ?? Fluconazole 100 MG Oral Tablet; Take 1 tablet daily  ?? Sulfamethoxazole-Trimethoprim 400-80 MG Oral Tablet; Take 1 tablet daily  ?? valGANciclovir HCl - 450 MG Oral Tablet; Take 1 tablet twice daily    Vitamins/electrolytes:   ?? Calcium Citrate + D3 315-6.25 MG-MCG Oral Tablet; TAKE 2 TABLET Twice daily  ?? Dialyvite Oral Tablet; Take 1 tablet daily  ?? Lokelma 10 GM Oral Packet; TAKE 10 GM Daily PRN when instructed to do so  ?? Magnesium Oxide 400 MG Oral Tablet; TAKE 2 TABLET 3 times daily    Etc:   ?? OLANZapine 5 MG Oral Tablet; TAKE 1 TABLET AT BEDTIME  ?? Pantoprazole Sodium 40 MG Oral Tablet Delayed Release; TAKE 1 TABLET DAILY  ?? Symbicort 160-4.5 MCG/ACT Inhalation Aerosol; INHALE 1 PUFFS Twice daily    Cardiac Studies:   EC24: unchanged from prior   24: ST 102bpm, MAGDA, right axis   Echo: : CONCLUSIONS: Nml biV function, consider effusive constrictive pericaridits. 2/4 criterial annulus reversus and >30% trans jhonny flow variation. however IVC not dilated, no septal bounce. Moderate pericardial effusion,  thickened pericardium.  : Nml BiV function, A pericardial effusion without tamponade is seen measuring 1.3cm adjacent to RV   : normal biventricular function  : normal biventricular function    Cardiac Cath/PCI: 24: RHC/EMBx RA 6 PAP 32/18/23 PCW 22 Pasat 70% Juan CO/CI 5.7/2.9, EMBx ISHLT Grade 1R/1A, CD4 negative  24: RHC/EMBx ISHLT Grade 1R/1A  24: RHC/EMBx RA 4 PAP 26/10/18 PCW 13 PaSat 63% Juan CO/CI 6/3.1, EMBx ISHLT grade 0; AMR 0  24: RHC/EMBx: RA 5, RV 19/6/8, PA 21/13/17, PCWP 15, PA sat 68.9, EMBx ISHLT Grade 0R  24: RHC/EMBx: RA 4 PAP 20/12/15 PCW 15 PaSat 66% Juan CO/CI EMBx ISHLT Grade 0R      PMHx:   HTN (hypertension)  CAD (coronary artery disease)  Intracranial hemorrhage  Respiratory arrest  Myocardial infarction, unspecified MI type, unspecified artery    PSHx:   History of coronary artery stent placement  H/O heart transplant    Allergies:  penicillins (Unknown)    FAMILY HISTORY:  Family history of meningitis (Sibling)  Brother, death at age 49 from complications of infection (2015)    Family history of hypertension in mother  Mother    REVIEW OF SYSTEMS:  CONSTITUTIONAL: No weakness, fevers or chills  EYES/ENT: No visual changes;  No dysphagia  NECK: No pain or stiffness  RESPIRATORY: No cough, wheezing, hemoptysis; No shortness of breath  CARDIOVASCULAR: No chest pain or palpitations; No lower extremity edema  GASTROINTESTINAL: No abdominal or epigastric pain. No nausea, vomiting, or hematemesis; No diarrhea or constipation. No melena or hematochezia.  BACK: No back pain  GENITOURINARY: No dysuria, frequency or hematuria  NEUROLOGICAL: No numbness or weakness  SKIN: No itching, burning, rashes, or lesions   All other review of systems is negative unless indicated above.    Physical Exam:  T(F): 97.3 (02-20), Max: 97.5 ()  HR: 106 () (91 - 106)  BP: 132/84 (-) (120/86 - 146/94)  RR: 18 (-)  SpO2: 97% ()  GENERAL: No acute distress, well-developed  HEAD:  Atraumatic, Normocephalic  ENT: EOMI, PERRLA, conjunctiva and sclera clear, Neck supple, No JVD, moist mucosa  CHEST/LUNG: Clear to auscultation bilaterally; No wheeze, equal breath sounds bilaterally   BACK: No spinal tenderness  HEART: Regular, no MRG, distant heart sounds   ABDOMEN: Soft, Nontender, Nondistended; Bowel sounds present  EXTREMITIES:  No clubbing, cyanosis, or edema  PSYCH: Nl behavior, nl affect  NEUROLOGY: AAOx3, non-focal, cranial nerves intact  SKIN: Normal color, No rashes or lesions    Labs: Personally reviewed                        12.9   5.27  )-----------( 232      ( 2024 08:49 )             38.2         132<L>  |  94<L>  |  29<H>  ----------------------------<  313<H>  3.9   |  21<L>  |  1.14    Ca    9.8      2024 08:49  Mg     1.8         TPro  6.7  /  Alb  4.3  /  TBili  0.2  /  DBili  x   /  AST  34  /  ALT  22  /  AlkPhos  61

## 2024-02-20 NOTE — H&P ADULT - PROBLEM SELECTOR PLAN 1
s/p Heart transplant 12/25/23  - currently off diuretics, would not diurese as may precipitate tamponade   - TTE 2/20 demonstrated normal biventricular function   - Endomyocardial biopsy 2/6 with mild cellular rejection; f/u 2/20 EMBx. Continue home regimen prednisone/cellcept/tacro for now   - F/u Tacro Lvl (goal 12-14)  - Continue ID ppx meds including fluc 100mg daily, bactrim 400/80 daily, valcyte 450 bid   - c/w ASA 81mg  - c/w statin  - Replete K/Mg 4/2 respectively.

## 2024-02-20 NOTE — ASU DISCHARGE PLAN (ADULT/PEDIATRIC) - NS MD DC FALL RISK RISK
For information on Fall & Injury Prevention, visit: [email or web address] OR  [email or web address] OR  https://www.cdc.gov/steadi/patient.html

## 2024-02-21 ENCOUNTER — APPOINTMENT (OUTPATIENT)
Dept: HEART FAILURE | Facility: CLINIC | Age: 59
End: 2024-02-21

## 2024-02-21 ENCOUNTER — APPOINTMENT (OUTPATIENT)
Dept: INTERVENTIONAL RADIOLOGY/VASCULAR | Facility: CLINIC | Age: 59
End: 2024-02-21

## 2024-02-21 VITALS
HEIGHT: 67 IN | BODY MASS INDEX: 27.78 KG/M2 | WEIGHT: 177 LBS | DIASTOLIC BLOOD PRESSURE: 92 MMHG | OXYGEN SATURATION: 97 % | TEMPERATURE: 98 F | SYSTOLIC BLOOD PRESSURE: 124 MMHG | HEART RATE: 109 BPM

## 2024-02-21 DIAGNOSIS — G47.33 OBSTRUCTIVE SLEEP APNEA (ADULT) (PEDIATRIC): ICD-10-CM

## 2024-02-22 LAB
ALBUMIN SERPL ELPH-MCNC: 4.8 G/DL
ALP BLD-CCNC: 63 U/L
ALT SERPL-CCNC: 25 U/L
ANION GAP SERPL CALC-SCNC: 15 MMOL/L
AST SERPL-CCNC: 31 U/L
BILIRUB SERPL-MCNC: 0.3 MG/DL
BUN SERPL-MCNC: 25 MG/DL
CALCIUM SERPL-MCNC: 10.2 MG/DL
CHLORIDE SERPL-SCNC: 95 MMOL/L
CO2 SERPL-SCNC: 25 MMOL/L
CREAT SERPL-MCNC: 1.2 MG/DL
EGFR: 70 ML/MIN/1.73M2
GLUCOSE SERPL-MCNC: 266 MG/DL
POTASSIUM SERPL-SCNC: 3.8 MMOL/L
PROT SERPL-MCNC: 6.9 G/DL
SODIUM SERPL-SCNC: 135 MMOL/L
TACROLIMUS SERPL-MCNC: 15.1 NG/ML

## 2024-02-22 RX ORDER — TACROLIMUS 1 MG/1
1 CAPSULE ORAL
Qty: 150 | Refills: 5 | Status: DISCONTINUED | COMMUNITY
Start: 2024-01-04 | End: 2024-02-22

## 2024-02-27 ENCOUNTER — LABORATORY RESULT (OUTPATIENT)
Age: 59
End: 2024-02-27

## 2024-02-28 LAB
ALBUMIN SERPL ELPH-MCNC: 4.4 G/DL
ALP BLD-CCNC: 60 U/L
ALT SERPL-CCNC: 26 U/L
ANION GAP SERPL CALC-SCNC: 17 MMOL/L
AST SERPL-CCNC: 33 U/L
BASOPHILS # BLD AUTO: 0.05 K/UL
BASOPHILS NFR BLD AUTO: 0.9 %
BILIRUB SERPL-MCNC: 0.3 MG/DL
BUN SERPL-MCNC: 35 MG/DL
CALCIUM SERPL-MCNC: 10.1 MG/DL
CHLORIDE SERPL-SCNC: 95 MMOL/L
CO2 SERPL-SCNC: 24 MMOL/L
CREAT SERPL-MCNC: 1.33 MG/DL
EGFR: 62 ML/MIN/1.73M2
EOSINOPHIL # BLD AUTO: 0.05 K/UL
EOSINOPHIL NFR BLD AUTO: 0.9 %
GLUCOSE SERPL-MCNC: 245 MG/DL
HCT VFR BLD CALC: 41.3 %
HGB BLD-MCNC: 14.2 G/DL
HGB FLD-MCNC: 12.6 G/DL — SIGNIFICANT CHANGE UP (ref 12.6–17.4)
LYMPHOCYTES # BLD AUTO: 1.69 K/UL
LYMPHOCYTES NFR BLD AUTO: 31 %
MAGNESIUM SERPL-MCNC: 1.7 MG/DL
MAN DIFF?: NORMAL
MCHC RBC-ENTMCNC: 29.3 PG
MCHC RBC-ENTMCNC: 34.4 GM/DL
MCV RBC AUTO: 85.3 FL
MONOCYTES # BLD AUTO: 0.19 K/UL
MONOCYTES NFR BLD AUTO: 3.5 %
NEUTROPHILS # BLD AUTO: 3.37 K/UL
NEUTROPHILS NFR BLD AUTO: 61.9 %
OXYHGB MFR BLDMV: 70.2 % — LOW (ref 90–95)
PLATELET # BLD AUTO: 244 K/UL
POTASSIUM SERPL-SCNC: 3.4 MMOL/L
PROT SERPL-MCNC: 6.6 G/DL
RBC # BLD: 4.84 M/UL
RBC # FLD: 14.7 %
SAO2 % BLD: 70.8 % — SIGNIFICANT CHANGE UP (ref 60–90)
SODIUM SERPL-SCNC: 135 MMOL/L
TACROLIMUS SERPL-MCNC: 18.4 NG/ML
WBC # FLD AUTO: 5.45 K/UL

## 2024-03-05 ENCOUNTER — APPOINTMENT (OUTPATIENT)
Dept: HEART FAILURE | Facility: CLINIC | Age: 59
End: 2024-03-05
Payer: COMMERCIAL

## 2024-03-05 ENCOUNTER — OUTPATIENT (OUTPATIENT)
Dept: OUTPATIENT SERVICES | Facility: HOSPITAL | Age: 59
LOS: 1 days | End: 2024-03-05
Payer: COMMERCIAL

## 2024-03-05 ENCOUNTER — APPOINTMENT (OUTPATIENT)
Dept: CV DIAGNOSITCS | Facility: HOSPITAL | Age: 59
End: 2024-03-05

## 2024-03-05 ENCOUNTER — RESULT REVIEW (OUTPATIENT)
Age: 59
End: 2024-03-05

## 2024-03-05 VITALS
TEMPERATURE: 98.1 F | SYSTOLIC BLOOD PRESSURE: 108 MMHG | DIASTOLIC BLOOD PRESSURE: 75 MMHG | BODY MASS INDEX: 36.91 KG/M2 | WEIGHT: 188 LBS | HEIGHT: 60 IN | HEART RATE: 114 BPM | OXYGEN SATURATION: 96 %

## 2024-03-05 DIAGNOSIS — Z94.1 HEART TRANSPLANT STATUS: ICD-10-CM

## 2024-03-05 DIAGNOSIS — Z95.5 PRESENCE OF CORONARY ANGIOPLASTY IMPLANT AND GRAFT: Chronic | ICD-10-CM

## 2024-03-05 DIAGNOSIS — E87.6 HYPOKALEMIA: ICD-10-CM

## 2024-03-05 DIAGNOSIS — Z94.1 HEART TRANSPLANT STATUS: Chronic | ICD-10-CM

## 2024-03-05 PROCEDURE — 93306 TTE W/DOPPLER COMPLETE: CPT

## 2024-03-05 PROCEDURE — 99214 OFFICE O/P EST MOD 30 MIN: CPT | Mod: 25

## 2024-03-05 PROCEDURE — 93306 TTE W/DOPPLER COMPLETE: CPT | Mod: 26

## 2024-03-06 ENCOUNTER — LABORATORY RESULT (OUTPATIENT)
Age: 59
End: 2024-03-06

## 2024-03-06 PROBLEM — E87.6 HYPOKALEMIA: Status: ACTIVE | Noted: 2024-03-06

## 2024-03-06 LAB
ALBUMIN SERPL ELPH-MCNC: 4.6 G/DL
ALP BLD-CCNC: 55 U/L
ALT SERPL-CCNC: 31 U/L
ANION GAP SERPL CALC-SCNC: 17 MMOL/L
AST SERPL-CCNC: 38 U/L
BILIRUB SERPL-MCNC: 0.3 MG/DL
BUN SERPL-MCNC: 27 MG/DL
CALCIUM SERPL-MCNC: 10.4 MG/DL
CHLORIDE SERPL-SCNC: 95 MMOL/L
CO2 SERPL-SCNC: 26 MMOL/L
CREAT SERPL-MCNC: 1.43 MG/DL
EGFR: 56 ML/MIN/1.73M2
GLUCOSE SERPL-MCNC: 136 MG/DL
MAGNESIUM SERPL-MCNC: 1.9 MG/DL
POTASSIUM SERPL-SCNC: 3.1 MMOL/L
PROT SERPL-MCNC: 6.7 G/DL
SODIUM SERPL-SCNC: 138 MMOL/L
TACROLIMUS SERPL-MCNC: 13.7 NG/ML

## 2024-03-06 NOTE — PHYSICAL EXAM
[Normal] : no rash, no skin lesions [No Edema] : no edema [No Rash] : no rash [Moves all extremities] : moves all extremities [Alert and Oriented] : alert and oriented [de-identified] : +midsternal chest healed

## 2024-03-06 NOTE — HISTORY OF PRESENT ILLNESS
[FreeTextEntry1] : 59 y/o M with h/o HFrEF, CAD s/p PCI (2008), HTN, DMT2 (A1c 8.3%) and CVA s/p TPA (2018), who is now s/p OHT 12/25/23 (ischemic time: 253min, CMV -/+, Toxo -/-) and presents for routine follow-up/biopsy.   Pt initially presented to [facility] via EMS after syncope reportedly requiring defibrillation, left AMA to come to [facility] on 11/1/23 where he was found to have Covid and intubated for respiratory failure. Then underwent L/RHC on 11/1 revealing  of LAD and RCA, elevated filling pressures and CI of 1.5 which prompted placement of IABP which was subsequently removed c/b PMVT cardiac arrest requiring replacement of IABP on 11/9. During hospitalization, treated for Staph epi, Enterococcus faecalis, Cutibacterium with IV abx.  Pt was evaluated for heart transplant, listed Status 2, ABO A, PRA 0% and on 12/25 a suitable donor was identified and he underwent OHT. His first RHC/EMBx revealed elevated filling pressures and diuretics were adjusted. His course was complicated by leukocytosis, however infectious workup was negative and CT scan revealed significant constipation requiring disimpaction with improvement.  Pt was persistently hyperglycemic, endocrine adjusted insulin regimen. He underwent his second biopsy which revealed normal filling pressures and ISHLT Grade 0R, and was discharged home 1/9/23.  Since post transplant, he has a moderate pericardial effusion that has been unchanged on serial TTE's.   Pt arrives today for routine f/u outpatient visit and biopsy #6 two months post heart transplant. He is accompanied by his daughter. He continues to report b/l LE weakness walking up stairs. He's able to ambulate around the home and progressing his activity as much as possible.  Home VS log revealed normotensive at home, weight stable 179-190 lbs, FS still vary 's mg/dL, taking insulin as prescribed and is being followed by endocrine.  Reports he still doesn't have full sense of taste but appetite is good. Sleeping well.  Currently denied SOB, CP, palpitations, N/V/D, fever, chills or LE edema. Pt reports 100% compliance with medications.

## 2024-03-06 NOTE — END OF VISIT
[FreeTextEntry3] : I, Dr. Martins, personally performed the evaluation and management (E/M) services for this patient who presents today with heart transplant. That E/M includes conducting the examination,assessing all new/exacerbated conditions, and establishing a plan of care.Today, My ACP, Cinthia Uriarte/ Mike Charles, was here during evaluation and management services for the above condition to be followed going forward.

## 2024-03-06 NOTE — CARDIOLOGY SUMMARY
[de-identified] : 2/6/24: RHC/EMBx RA 6 PAP 32/18/23 PCW 22 Pasat 70% Juan CO/CI 5.7/2.9, EMBx ISHLT Grade 1R/1A, CD4 negative 1/23/24: RHC/EMBx ISHLT Grade 1R/1A 1/16/24: RHC/EMBx RA 4 PAP 26/10/18 PCW 13 PaSat 63% Juan CO/CI 6/3.1, EMBx ISHLT grade 0; AMR 0 1/9/24: RHC/EMBx: RA 5, RV 19/6/8, PA 21/13/17, PCWP 15, PA sat 68.9, EMBx ISHLT Grade 0R 1/2/24: RHC/EMBx: RA 4 PAP 20/12/15 PCW 15 PaSat 66% Juan CO/CI EMBx ISHLT Grade 0R [de-identified] : 3/5/24: EKG  bpm 2/20/24: pending 2/6/24: ST 102bpm, MAGDA, right axis (unchanged compared to previous EKG 1/16/24) 1/16/24: ST 103bpm MAGDA, right axis [de-identified] : 3.5.24: TTE pending  2/14: CONCLUSIONS:  1. Left ventricular systolic function is normal with an ejection fraction visually estimated at 60 to 65 %.  2. Normal right ventricular cavity size and reduced systolic function.  3. Consider effusive constrictive pericaridits. 2/4 criterial annulus reversus and >30% trans jhonny flow variation. however IVC not dilated, no septal bounce.  4. Compared to the transthoracic echocardiogram performed on 2/6/2024, there have been no significant changes.  5. Moderate pericardial effusion.  6. The inferior vena cava is normal in size measuring 1.60 cm in diameter, (normal <2.1cm) with abnormal inspiratory collapse (abnormal <50%) consistent with mildly elevated right atrial pressure (~8, range 5-10mmHg).  7. Thickened pericardium. 2/6: TTE: CONCLUSIONS:  1. Limited TTE Pre and Post RV biopsy. No significant changes noted.  2. Left ventricular systolic function is normal with an ejection fraction visually estimated at 55 to 60 %.  3. Normal right ventricular cavity size and reduced systolic function.  4. Trace tricuspid regurgitation.  5. Moderate pericardial effusion with no evidence of hemodynamic compromise (or echocardiographic evidence of cardiac tamponade).  6. Compared to the transthoracic echocardiogram performed on 1/23/2024, The effusion appears larger.. Findings were discussed with Dr. Francesca Richardson on 2/6/2024 at bedside during TTE.  1/9: normal biventricular function 1/2: normal biventricular function [de-identified] : CT chest abd/pelvis 1/5/24: IMPRESSION: Question mild stercoral colitis. Expected postoperative changes in the chest. No fluid collection.  DSAs: 1/9: 0%  1/2: 0%  Heartcare: not collected yet

## 2024-03-06 NOTE — PHYSICAL EXAM
[No Edema] : no edema [Normal] : no rash, no skin lesions [No Rash] : no rash [Moves all extremities] : moves all extremities [Alert and Oriented] : alert and oriented [de-identified] : +midsternal chest healed

## 2024-03-06 NOTE — END OF VISIT
[FreeTextEntry3] : I, Dr. Martins, personally performed the evaluation and management (E/M) services for this patient who presents today with heart transplant. That E/M includes conducting the examination,assessing all new/exacerbated conditions, and establishing a plan of care.Today, My ACP, Cinthia Uriarte, was here during evaluation and management services for the above condition to be followed going forward. is planned for TTE after visit to assess Pericardial effusion size.  Will plan for Labs tomorrow morning appears euvolemic discussed at length abot his diet and blood sugar control Will refer to cardiac rehab going to work part time.

## 2024-03-06 NOTE — HISTORY OF PRESENT ILLNESS
[FreeTextEntry1] : 57 y/o M with h/o HFrEF, CAD s/p PCI (2008), HTN, DMT2 (A1c 8.3%) and CVA s/p TPA (2018), who is now s/p OHT 12/25/23 (ischemic time: 253min, CMV -/+, Toxo -/-) and presents for routine follow-up/biopsy.   Pt initially presented to [facility] via EMS after syncope reportedly requiring defibrillation, left AMA to come to [facility] on 11/1/23 where he was found to have Covid and intubated for respiratory failure. Then underwent L/RHC on 11/1 revealing  of LAD and RCA, elevated filling pressures and CI of 1.5 which prompted placement of IABP which was subsequently removed c/b PMVT cardiac arrest requiring replacement of IABP on 11/9. During hospitalization, treated for Staph epi, Enterococcus faecalis, Cutibacterium with IV abx.  Pt was evaluated for heart transplant, listed Status 2, ABO A, PRA 0% and on 12/25 a suitable donor was identified and he underwent OHT. His first RHC/EMBx revealed elevated filling pressures and diuretics were adjusted. His course was complicated by leukocytosis, however infectious workup was negative and CT scan revealed significant constipation requiring disimpaction with improvement.  Pt was persistently hyperglycemic, endocrine adjusted insulin regimen. He underwent his second biopsy which revealed normal filling pressures and ISHLT Grade 0R, and was discharged home 1/9/23.  Since post transplant, he has a moderate pericardial effusion that has been unchanged on serial TTE's.   Pt arrives today for routine f/u outpatient visit. He reports feeling well and is getting stronger.  Home VS log revealed normotensive at home, FS still vary 's mg/dL, taking insulin as prescribed and is being followed by endocrine.  Sleeping well. He reports still having constipation and takes MOM or Linzess.  Currently denied SOB, CP, palpitations, N/V/D, fever, chills or LE edema. Pt reports 100% compliance with medications. Pt is working as a  again.

## 2024-03-06 NOTE — CARDIOLOGY SUMMARY
[de-identified] : 2/20/24: pending 2/6/24: ST 102bpm, MAGDA, right axis (unchanged compared to previous EKG 1/16/24) 1/16/24: ST 103bpm MAGDA, right axis [de-identified] : 2/14: CONCLUSIONS:  1. Left ventricular systolic function is normal with an ejection fraction visually estimated at 60 to 65 %.  2. Normal right ventricular cavity size and reduced systolic function.  3. Consider effusive constrictive pericaridits. 2/4 criterial annulus reversus and >30% trans jhonny flow variation. however IVC not dilated, no septal bounce.  4. Compared to the transthoracic echocardiogram performed on 2/6/2024, there have been no significant changes.  5. Moderate pericardial effusion.  6. The inferior vena cava is normal in size measuring 1.60 cm in diameter, (normal <2.1cm) with abnormal inspiratory collapse (abnormal <50%) consistent with mildly elevated right atrial pressure (~8, range 5-10mmHg).  7. Thickened pericardium. 2/6: TTE: CONCLUSIONS:  1. Limited TTE Pre and Post RV biopsy. No significant changes noted.  2. Left ventricular systolic function is normal with an ejection fraction visually estimated at 55 to 60 %.  3. Normal right ventricular cavity size and reduced systolic function.  4. Trace tricuspid regurgitation.  5. Moderate pericardial effusion with no evidence of hemodynamic compromise (or echocardiographic evidence of cardiac tamponade).  6. Compared to the transthoracic echocardiogram performed on 1/23/2024, The effusion appears larger.. Findings were discussed with Dr. Francesca Richardson on 2/6/2024 at bedside during TTE.  1/9: normal biventricular function 1/2: normal biventricular function [de-identified] : 2/6/24: RHC/EMBx RA 6 PAP 32/18/23 PCW 22 Pasat 70% Juan CO/CI 5.7/2.9, EMBx ISHLT Grade 1R/1A, CD4 negative 1/23/24: RHC/EMBx ISHLT Grade 1R/1A 1/16/24: RHC/EMBx RA 4 PAP 26/10/18 PCW 13 PaSat 63% Juan CO/CI 6/3.1, EMBx ISHLT grade 0; AMR 0 1/9/24: RHC/EMBx: RA 5, RV 19/6/8, PA 21/13/17, PCWP 15, PA sat 68.9, EMBx ISHLT Grade 0R 1/2/24: RHC/EMBx: RA 4 PAP 20/12/15 PCW 15 PaSat 66% Juan CO/CI EMBx ISHLT Grade 0R [de-identified] : CT chest abd/pelvis 1/5/24: IMPRESSION: Question mild stercoral colitis. Expected postoperative changes in the chest. No fluid collection.  DSAs: 1/9: 0%  1/2: 0%  Heartcare: not collected yet

## 2024-03-07 LAB
BASOPHILS # BLD AUTO: 0.12 K/UL
BASOPHILS NFR BLD AUTO: 1.8 %
EOSINOPHIL # BLD AUTO: 0 K/UL
EOSINOPHIL NFR BLD AUTO: 0 %
HCT VFR BLD CALC: 41.3 %
HGB BLD-MCNC: 13.9 G/DL
LYMPHOCYTES # BLD AUTO: 2.43 K/UL
LYMPHOCYTES NFR BLD AUTO: 36.5 %
MAN DIFF?: NORMAL
MCHC RBC-ENTMCNC: 29.2 PG
MCHC RBC-ENTMCNC: 33.7 GM/DL
MCV RBC AUTO: 86.8 FL
MONOCYTES # BLD AUTO: 0.23 K/UL
MONOCYTES NFR BLD AUTO: 3.5 %
NEUTROPHILS # BLD AUTO: 3.66 K/UL
NEUTROPHILS NFR BLD AUTO: 53.9 %
PLATELET # BLD AUTO: 278 K/UL
RBC # BLD: 4.76 M/UL
RBC # FLD: 14.8 %
WBC # FLD AUTO: 6.67 K/UL

## 2024-03-11 ENCOUNTER — NON-APPOINTMENT (OUTPATIENT)
Age: 59
End: 2024-03-11

## 2024-03-11 ENCOUNTER — LABORATORY RESULT (OUTPATIENT)
Age: 59
End: 2024-03-11

## 2024-03-12 ENCOUNTER — APPOINTMENT (OUTPATIENT)
Dept: CARDIOLOGY | Facility: CLINIC | Age: 59
End: 2024-03-12

## 2024-03-12 ENCOUNTER — NON-APPOINTMENT (OUTPATIENT)
Age: 59
End: 2024-03-12

## 2024-03-12 LAB
ALBUMIN SERPL ELPH-MCNC: 4.8 G/DL
ALP BLD-CCNC: 55 U/L
ALT SERPL-CCNC: 29 U/L
ANION GAP SERPL CALC-SCNC: 14 MMOL/L
AST SERPL-CCNC: 29 U/L
BASOPHILS # BLD AUTO: 0.1 K/UL
BASOPHILS NFR BLD AUTO: 1.7 %
BILIRUB SERPL-MCNC: 0.3 MG/DL
BUN SERPL-MCNC: 21 MG/DL
CALCIUM SERPL-MCNC: 10.4 MG/DL
CHLORIDE SERPL-SCNC: 97 MMOL/L
CO2 SERPL-SCNC: 27 MMOL/L
CREAT SERPL-MCNC: 1.18 MG/DL
EGFR: 71 ML/MIN/1.73M2
EOSINOPHIL # BLD AUTO: 0.26 K/UL
EOSINOPHIL NFR BLD AUTO: 4.3 %
GLUCOSE SERPL-MCNC: 189 MG/DL
HCT VFR BLD CALC: 42.7 %
HGB BLD-MCNC: 14 G/DL
LYMPHOCYTES # BLD AUTO: 2.12 K/UL
LYMPHOCYTES NFR BLD AUTO: 35.7 %
MAGNESIUM SERPL-MCNC: 1.8 MG/DL
MAN DIFF?: NORMAL
MCHC RBC-ENTMCNC: 29.8 PG
MCHC RBC-ENTMCNC: 32.8 GM/DL
MCV RBC AUTO: 90.9 FL
MONOCYTES # BLD AUTO: 0.15 K/UL
MONOCYTES NFR BLD AUTO: 2.6 %
NEUTROPHILS # BLD AUTO: 3.31 K/UL
NEUTROPHILS NFR BLD AUTO: 54.8 %
PLATELET # BLD AUTO: 299 K/UL
POTASSIUM SERPL-SCNC: 3.8 MMOL/L
PROT SERPL-MCNC: 6.8 G/DL
RBC # BLD: 4.7 M/UL
RBC # FLD: 14.7 %
SODIUM SERPL-SCNC: 137 MMOL/L
TACROLIMUS SERPL-MCNC: 14.9 NG/ML
WBC # FLD AUTO: 5.94 K/UL

## 2024-03-12 NOTE — REASON FOR VISIT
[Home] : at home, [unfilled] , at the time of the visit. [Medical Office: ([facility])___] : at the medical office located in  [Verbal consent obtained from patient] : the patient, [unfilled] [Assessment] : an assessment

## 2024-03-15 NOTE — ASSESSMENT
[Other: _____] : [unfilled] [FreeTextEntry1] : Mr. Hardy is a 58 y.o male with a pmh of HLD, HTN, ELOY, T2DM, MI, ICH, CVA s/p TPA (2018), severe persistent asthma, HFrEF, CAD s/p PCI (2008), s/p heart transplant 12/25/23 with persistent pericardial effusion on TTE's referred to IR for pericardial drain.  1. Pericardial effusion - pt with hx of heart transplant 12/25/23 with persistent pericardial effusion without tamponade on TTE's - referred by  Dr. Martins/ Heart transplant team for pericardial drain placement - 1/5 CT: Trace air and fluid in the paracardial space, likely postoperative. - 2/20/24 TTE: There is a moderate pericardial effusion. - imaging reviewed and discussed with pt - I reviewed the procedure, including the risks, benefits, alternatives, and expected post procedure course. - on baby aspirin daily-hold?  - 2/14/24 CBC & CMP reviewed- needs PT/INR  - will make arrangements   2. T2DM  - A1C eval as per PST - reports AM FBS range is  - Maintained on Lantus 30 units at bedtime & Novolog (20 units before breakfast, 17 units before lunch and 15 units before dinner) - pre op medication dosing modification / hold as per PST / Managing MD - will reach out to endocrinologist Dr. Caitlin Ramos for management of insulin preop - FBS pre op dos  3. ELOY & persistant severe asthma - on nocturnal CPAP regimen - denies CPAP requirement - airway eval & mgmt as per PST/Anesthesia team  4.  CVA s/p TPA (2018) - no further neurological events?  I have provided the patient the opportunity to ask questions and have answered them to their satisfaction.  They are encouraged to contact our office with any further questions, concerns, or issues.

## 2024-03-15 NOTE — REASON FOR VISIT
[Consultation] : a consultation visit [Home] : at home, [unfilled] , at the time of the visit. [Medical Office: ([facility])___] : at the medical office located in  [Patient] : the patient [FreeTextEntry1] : Pericardial drain

## 2024-03-15 NOTE — HISTORY OF PRESENT ILLNESS
[0] : ~His/Her~ pain was 0 out of 10 [FreeTextEntry1] : Mr. Hardy is a 58 y.o male with a pmh of HLD, HTN, ELOY, T2DM, MI, ICH, CVA s/p TPA (2018), severe persistent asthma, HFrEF, CAD s/p PCI (2008), s/p heart transplant 12/25/23 with persistent pericardial effusion on TTE's referred to IR for pericardial drain.  Pt initially presented to [facility] via EMS after syncope reportedly requiring defibrillation, left AMA to come to [facility] on 11/1/23 where he was found to have Covid and intubated for respiratory failure. Then underwent L/RHC on 11/1 revealing  of LAD and RCA, elevated filling pressures and CI of 1.5 which prompted placement of IABP which was subsequently removed c/b PMVT cardiac arrest requiring replacement of IABP on 11/9. He then underwent a heart transplant on 12/25/23.    Since post transplant, he has a moderate pericardial effusion that has been unchanged on serial TTE's. He is referred by  Dr. Martins/ Heart transplant team for pericardial drain placement.  On aspirin 81mg daily.  Transplant Cardiology: Gm Martins/ LANETTE Uriarte

## 2024-03-19 ENCOUNTER — RESULT REVIEW (OUTPATIENT)
Age: 59
End: 2024-03-19

## 2024-03-19 ENCOUNTER — TRANSCRIPTION ENCOUNTER (OUTPATIENT)
Age: 59
End: 2024-03-19

## 2024-03-19 ENCOUNTER — OUTPATIENT (OUTPATIENT)
Dept: OUTPATIENT SERVICES | Facility: HOSPITAL | Age: 59
LOS: 1 days | End: 2024-03-19
Payer: COMMERCIAL

## 2024-03-19 ENCOUNTER — APPOINTMENT (OUTPATIENT)
Dept: CARDIOLOGY | Facility: CLINIC | Age: 59
End: 2024-03-19

## 2024-03-19 ENCOUNTER — APPOINTMENT (OUTPATIENT)
Dept: CV DIAGNOSITCS | Facility: HOSPITAL | Age: 59
End: 2024-03-19

## 2024-03-19 ENCOUNTER — APPOINTMENT (OUTPATIENT)
Dept: HEART FAILURE | Facility: CLINIC | Age: 59
End: 2024-03-19
Payer: COMMERCIAL

## 2024-03-19 VITALS
OXYGEN SATURATION: 98 % | HEART RATE: 117 BPM | SYSTOLIC BLOOD PRESSURE: 128 MMHG | WEIGHT: 181 LBS | HEIGHT: 67 IN | DIASTOLIC BLOOD PRESSURE: 80 MMHG | TEMPERATURE: 98 F | RESPIRATION RATE: 18 BRPM

## 2024-03-19 VITALS
DIASTOLIC BLOOD PRESSURE: 80 MMHG | TEMPERATURE: 98.4 F | SYSTOLIC BLOOD PRESSURE: 128 MMHG | HEIGHT: 67 IN | WEIGHT: 181 LBS | RESPIRATION RATE: 18 BRPM | BODY MASS INDEX: 28.41 KG/M2 | OXYGEN SATURATION: 94 % | HEART RATE: 117 BPM

## 2024-03-19 VITALS
DIASTOLIC BLOOD PRESSURE: 83 MMHG | SYSTOLIC BLOOD PRESSURE: 119 MMHG | OXYGEN SATURATION: 97 % | RESPIRATION RATE: 17 BRPM | HEART RATE: 97 BPM

## 2024-03-19 DIAGNOSIS — Z95.5 PRESENCE OF CORONARY ANGIOPLASTY IMPLANT AND GRAFT: Chronic | ICD-10-CM

## 2024-03-19 DIAGNOSIS — Z94.1 HEART TRANSPLANT STATUS: Chronic | ICD-10-CM

## 2024-03-19 DIAGNOSIS — Z94.1 HEART TRANSPLANT STATUS: ICD-10-CM

## 2024-03-19 LAB
ALBUMIN SERPL ELPH-MCNC: 4.4 G/DL — SIGNIFICANT CHANGE UP (ref 3.3–5)
ALP SERPL-CCNC: 50 U/L — SIGNIFICANT CHANGE UP (ref 40–120)
ALT FLD-CCNC: 31 U/L — SIGNIFICANT CHANGE UP (ref 10–45)
ANION GAP SERPL CALC-SCNC: 18 MMOL/L — HIGH (ref 5–17)
AST SERPL-CCNC: 36 U/L — SIGNIFICANT CHANGE UP (ref 10–40)
BASOPHILS # BLD AUTO: 0 K/UL — SIGNIFICANT CHANGE UP (ref 0–0.2)
BASOPHILS NFR BLD AUTO: 0 % — SIGNIFICANT CHANGE UP (ref 0–2)
BILIRUB SERPL-MCNC: 0.3 MG/DL — SIGNIFICANT CHANGE UP (ref 0.2–1.2)
BUN SERPL-MCNC: 22 MG/DL — SIGNIFICANT CHANGE UP (ref 7–23)
CALCIUM SERPL-MCNC: 10.2 MG/DL — SIGNIFICANT CHANGE UP (ref 8.4–10.5)
CHLORIDE SERPL-SCNC: 96 MMOL/L — SIGNIFICANT CHANGE UP (ref 96–108)
CO2 SERPL-SCNC: 23 MMOL/L — SIGNIFICANT CHANGE UP (ref 22–31)
CREAT SERPL-MCNC: 1.42 MG/DL — HIGH (ref 0.5–1.3)
EGFR: 57 ML/MIN/1.73M2 — LOW
EOSINOPHIL # BLD AUTO: 0.14 K/UL — SIGNIFICANT CHANGE UP (ref 0–0.5)
EOSINOPHIL NFR BLD AUTO: 2.6 % — SIGNIFICANT CHANGE UP (ref 0–6)
GIANT PLATELETS BLD QL SMEAR: PRESENT — SIGNIFICANT CHANGE UP
GLUCOSE SERPL-MCNC: 205 MG/DL — HIGH (ref 70–99)
HCT VFR BLD CALC: 40 % — SIGNIFICANT CHANGE UP (ref 39–50)
HGB BLD-MCNC: 13.3 G/DL — SIGNIFICANT CHANGE UP (ref 13–17)
HGB FLD-MCNC: 12.5 G/DL — LOW (ref 12.6–17.4)
HGB FLD-MCNC: 12.6 G/DL — SIGNIFICANT CHANGE UP (ref 12.6–17.4)
LYMPHOCYTES # BLD AUTO: 1.98 K/UL — SIGNIFICANT CHANGE UP (ref 1–3.3)
LYMPHOCYTES # BLD AUTO: 37.2 % — SIGNIFICANT CHANGE UP (ref 13–44)
MAGNESIUM SERPL-MCNC: 1.5 MG/DL — LOW (ref 1.6–2.6)
MANUAL SMEAR VERIFICATION: SIGNIFICANT CHANGE UP
MCHC RBC-ENTMCNC: 29.2 PG — SIGNIFICANT CHANGE UP (ref 27–34)
MCHC RBC-ENTMCNC: 33.3 GM/DL — SIGNIFICANT CHANGE UP (ref 32–36)
MCV RBC AUTO: 87.9 FL — SIGNIFICANT CHANGE UP (ref 80–100)
MONOCYTES # BLD AUTO: 0.28 K/UL — SIGNIFICANT CHANGE UP (ref 0–0.9)
MONOCYTES NFR BLD AUTO: 5.3 % — SIGNIFICANT CHANGE UP (ref 2–14)
NEUTROPHILS # BLD AUTO: 2.93 K/UL — SIGNIFICANT CHANGE UP (ref 1.8–7.4)
NEUTROPHILS NFR BLD AUTO: 54.9 % — SIGNIFICANT CHANGE UP (ref 43–77)
OXYHGB MFR BLDMV: 64.9 % — LOW (ref 90–95)
OXYHGB MFR BLDMV: 65.5 % — LOW (ref 90–95)
PLAT MORPH BLD: ABNORMAL
PLATELET # BLD AUTO: 245 K/UL — SIGNIFICANT CHANGE UP (ref 150–400)
POTASSIUM SERPL-MCNC: 3.1 MMOL/L — LOW (ref 3.5–5.3)
POTASSIUM SERPL-SCNC: 3.1 MMOL/L — LOW (ref 3.5–5.3)
PROT SERPL-MCNC: 7.1 G/DL — SIGNIFICANT CHANGE UP (ref 6–8.3)
RBC # BLD: 4.55 M/UL — SIGNIFICANT CHANGE UP (ref 4.2–5.8)
RBC # FLD: 14.6 % — HIGH (ref 10.3–14.5)
RBC BLD AUTO: SIGNIFICANT CHANGE UP
SAO2 % BLD: 65.6 % — SIGNIFICANT CHANGE UP (ref 60–90)
SAO2 % BLD: 66 % — SIGNIFICANT CHANGE UP (ref 60–90)
SODIUM SERPL-SCNC: 137 MMOL/L — SIGNIFICANT CHANGE UP (ref 135–145)
TACROLIMUS SERPL-MCNC: 10.3 NG/ML — SIGNIFICANT CHANGE UP
WBC # BLD: 5.33 K/UL — SIGNIFICANT CHANGE UP (ref 3.8–10.5)
WBC # FLD AUTO: 5.33 K/UL — SIGNIFICANT CHANGE UP (ref 3.8–10.5)

## 2024-03-19 PROCEDURE — 88346 IMFLUOR 1ST 1ANTB STAIN PX: CPT | Mod: 26

## 2024-03-19 PROCEDURE — C1889: CPT

## 2024-03-19 PROCEDURE — 80197 ASSAY OF TACROLIMUS: CPT

## 2024-03-19 PROCEDURE — 93505 ENDOMYOCARDIAL BIOPSY: CPT | Mod: 26

## 2024-03-19 PROCEDURE — 88307 TISSUE EXAM BY PATHOLOGIST: CPT | Mod: 26

## 2024-03-19 PROCEDURE — 93010 ELECTROCARDIOGRAM REPORT: CPT

## 2024-03-19 PROCEDURE — 93308 TTE F-UP OR LMTD: CPT

## 2024-03-19 PROCEDURE — 93505 ENDOMYOCARDIAL BIOPSY: CPT

## 2024-03-19 PROCEDURE — 93321 DOPPLER ECHO F-UP/LMTD STD: CPT | Mod: 26

## 2024-03-19 PROCEDURE — 88307 TISSUE EXAM BY PATHOLOGIST: CPT

## 2024-03-19 PROCEDURE — 82803 BLOOD GASES ANY COMBINATION: CPT

## 2024-03-19 PROCEDURE — 83735 ASSAY OF MAGNESIUM: CPT

## 2024-03-19 PROCEDURE — 88346 IMFLUOR 1ST 1ANTB STAIN PX: CPT

## 2024-03-19 PROCEDURE — ZZZZZ: CPT

## 2024-03-19 PROCEDURE — 80053 COMPREHEN METABOLIC PANEL: CPT

## 2024-03-19 PROCEDURE — 99152 MOD SED SAME PHYS/QHP 5/>YRS: CPT

## 2024-03-19 PROCEDURE — C1887: CPT

## 2024-03-19 PROCEDURE — 93321 DOPPLER ECHO F-UP/LMTD STD: CPT

## 2024-03-19 PROCEDURE — 93308 TTE F-UP OR LMTD: CPT | Mod: 26

## 2024-03-19 PROCEDURE — C1894: CPT

## 2024-03-19 PROCEDURE — 85025 COMPLETE CBC W/AUTO DIFF WBC: CPT

## 2024-03-19 PROCEDURE — 93005 ELECTROCARDIOGRAM TRACING: CPT

## 2024-03-19 RX ORDER — POTASSIUM CHLORIDE 20 MEQ
2 PACKET (EA) ORAL
Refills: 0 | DISCHARGE

## 2024-03-19 RX ORDER — ASPIRIN/CALCIUM CARB/MAGNESIUM 324 MG
1 TABLET ORAL
Refills: 0 | DISCHARGE

## 2024-03-19 RX ORDER — TACROLIMUS 5 MG/1
1 CAPSULE ORAL
Refills: 0 | DISCHARGE

## 2024-03-19 RX ORDER — POTASSIUM CHLORIDE 20 MEQ
40 PACKET (EA) ORAL ONCE
Refills: 0 | Status: COMPLETED | OUTPATIENT
Start: 2024-03-19 | End: 2024-03-19

## 2024-03-19 RX ORDER — ROSUVASTATIN CALCIUM 5 MG/1
1 TABLET ORAL
Refills: 0 | DISCHARGE

## 2024-03-19 RX ORDER — MYCOPHENOLATE MOFETIL 250 MG/1
4 CAPSULE ORAL
Refills: 0 | DISCHARGE

## 2024-03-19 RX ORDER — BUDESONIDE AND FORMOTEROL FUMARATE DIHYDRATE 160; 4.5 UG/1; UG/1
1 AEROSOL RESPIRATORY (INHALATION)
Refills: 0 | DISCHARGE

## 2024-03-19 RX ORDER — TACROLIMUS 5 MG/1
4 CAPSULE ORAL
Refills: 0 | DISCHARGE

## 2024-03-19 RX ORDER — BUMETANIDE 0.25 MG/ML
1 INJECTION INTRAMUSCULAR; INTRAVENOUS
Refills: 0 | DISCHARGE

## 2024-03-19 RX ORDER — SODIUM ZIRCONIUM CYCLOSILICATE 10 G/10G
10 POWDER, FOR SUSPENSION ORAL
Refills: 0 | DISCHARGE

## 2024-03-19 RX ORDER — ASPIRIN/CALCIUM CARB/MAGNESIUM 324 MG
1 TABLET ORAL
Qty: 0 | Refills: 0 | DISCHARGE

## 2024-03-19 RX ORDER — ALBUTEROL 90 UG/1
3 AEROSOL, METERED ORAL
Qty: 0 | Refills: 0 | DISCHARGE

## 2024-03-19 RX ORDER — INSULIN GLARGINE 100 [IU]/ML
30 INJECTION, SOLUTION SUBCUTANEOUS
Refills: 0 | DISCHARGE

## 2024-03-19 RX ORDER — MAGNESIUM SULFATE 500 MG/ML
2 VIAL (ML) INJECTION ONCE
Refills: 0 | Status: COMPLETED | OUTPATIENT
Start: 2024-03-19 | End: 2024-03-19

## 2024-03-19 RX ORDER — COLCHICINE 0.6 MG
1 TABLET ORAL
Refills: 0 | DISCHARGE

## 2024-03-19 RX ORDER — ATORVASTATIN CALCIUM 80 MG/1
1 TABLET, FILM COATED ORAL
Qty: 0 | Refills: 0 | DISCHARGE

## 2024-03-19 RX ADMIN — Medication 40 MILLIEQUIVALENT(S): at 09:08

## 2024-03-19 RX ADMIN — Medication 25 GRAM(S): at 09:08

## 2024-03-19 NOTE — ASU PATIENT PROFILE, ADULT - FALL HARM RISK - UNIVERSAL INTERVENTIONS
Bed in lowest position, wheels locked, appropriate side rails in place/Call bell, personal items and telephone in reach/Instruct patient to call for assistance before getting out of bed or chair/Non-slip footwear when patient is out of bed/Viking to call system/Physically safe environment - no spills, clutter or unnecessary equipment/Purposeful Proactive Rounding/Room/bathroom lighting operational, light cord in reach

## 2024-03-19 NOTE — ASU DISCHARGE PLAN (ADULT/PEDIATRIC) - CARE PROVIDER_API CALL
Gm Martins  Adv Heart [facility]ail Trns[facility] Cardio  [address] [place] [id number]  Phone: [phone]  Fax: [phone]  Established Patient  Follow Up Time:

## 2024-03-19 NOTE — CHART NOTE - NSCHARTNOTEFT_GEN_A_CORE
H&P reviewed and no changes as per patient.   Physical exam normal   Allergies reviewed and patient denies any allergy to PCN  All meds reviewed and reconciled

## 2024-03-20 LAB — SURGICAL PATHOLOGY STUDY: SIGNIFICANT CHANGE UP

## 2024-03-20 RX ORDER — FLUCONAZOLE 100 MG/1
100 TABLET ORAL DAILY
Qty: 30 | Refills: 5 | Status: DISCONTINUED | COMMUNITY
Start: 2024-01-04 | End: 2024-03-20

## 2024-03-20 NOTE — HISTORY OF PRESENT ILLNESS
[FreeTextEntry1] : 57 y/o M with h/o HFrEF, CAD s/p PCI (2008), HTN, DMT2 (A1c 8.3%) and CVA s/p TPA (2018), who is now s/p OHT 12/25/23 (ischemic time: 253min, CMV -/+, Toxo -/-) and presents for routine follow-up/biopsy.   Pt initially presented to [facility] via EMS after syncope reportedly requiring defibrillation, left AMA to come to [facility] on 11/1/23 where he was found to have Covid and intubated for respiratory failure. Then underwent L/RHC on 11/1 revealing  of LAD and RCA, elevated filling pressures and CI of 1.5 which prompted placement of IABP which was subsequently removed c/b PMVT cardiac arrest requiring replacement of IABP on 11/9. During hospitalization, treated for Staph epi, Enterococcus faecalis, Cutibacterium with IV abx.  Pt was evaluated for heart transplant, listed Status 2, ABO A, PRA 0% and on 12/25 a suitable donor was identified and he underwent OHT. His first RHC/EMBx revealed elevated filling pressures and diuretics were adjusted. His course was complicated by leukocytosis, however infectious workup was negative and CT scan revealed significant constipation requiring disimpaction with improvement.  Pt was persistently hyperglycemic, endocrine adjusted insulin regimen. He underwent his second biopsy which revealed normal filling pressures and ISHLT Grade 0R, and was discharged home 1/9/23.  Since post transplant, he has a moderate pericardial effusion that has been unchanged on serial TTE's.   Pt arrives today for routine f/u outpatient visit and biopsy 3 months post heart transplant. He reports feeling well and biggest complaint is constipation which is relieved with MOM or Linzess.  Home VS log revealed normotensive at home, FS still vary 's mg/dL, taking insulin as prescribed and is being followed by endocrine.  Sleeping well.  Currently denied SOB, CP, palpitations, N/V/D, fever, chills or LE edema. Pt reports 100% compliance with medications. Pt is working as a . Eager to start cardiac rehab.

## 2024-03-20 NOTE — DISCUSSION/SUMMARY
[___ Week(s)] : in [unfilled] week(s) [FreeTextEntry1] : # s/p Heart transplant 12/25/23 - f/u RHC/EMBx today - currently off diuretics  - last TTE demonstrated normal findings - c/w ASA 81mg po daily now  - c/w statin now that LFT's normalized - This patient has a heart allograft and is at risk for rejection through immune system recognition of non-self tissue. AlloMap and AlloSure and noninvasive tests ordered to evaluate for the probability of rejection after pretest and assist in immunosuppression management. Studies have shown that these tests can help to rule out rejection without subjecting the patient to the bleeding, arrhythmia, and structural damage risks of invasive endomyocardial biopsies. The AlloMap and AlloSure tests help guide clinical decision making for this patient's surveillance schedule and immunosuppression adjustments. - check heartcare monthly. 3/6/24 allosure = 0.06  #Immunosuppression - s/p Simulect on POD 12/25 and received second dose on POD 4 (12/29). - Continue with prednisone taper based on protocol  - Continue with Cellcept 1g PO BID - Will continue to adjust tacro as needed for goal 10-12. discontinue fluconazole now and increase prograf dose from 4mg bid to 6mg bid. Repeat level on friday  # Prophylactic antibiotic  - CMV -/+: intermediate risk.  Continue Valcyte 450 mg PO BID. Check CMV PCR monthly (not detected 2/6/24) - Toxo -/-: none required - PJP ppx: continue Bactrim S/S PO QD - Trush ppx: completed fluconazole  - donor HCV TIM - but HCV antibody +. Check 1 month [facility] labs (not detected 1/25/24), then 3 mo, 12 mo  # ARIC (acute kidney injury) - pre transplant baseline Cr 1.2, peaked to 4 - monitor closely, avoid nephrotoxic agents - no need for diuretics based on today's RHC  #moderate pericardial effusion - continue to monitor - c/w colchicine 0.6mg daily and bumex 1mg daily (both started on 2/27/24) - repeat noncontrast chest CT now. re-discuss with IR vs CTS if intervention is needed  #HTN - continue nifedipine 60mg bid - continue hydralazine 25mg TID  #Constipation - CT abd/pelvis showed stercoral colitis 1/5 - S/p MgCO3, lactulose 20 QID +/- golytely  - GI consulted and signed off  - Can also use bisacodyl suppository if pt amenable  - Encourage ambulation  # Hyperglycemia, steroid induced.  - Endo following, mango placed - c/w lantus 29 units qhs and ISS - recommended to continue following diabetic diet  #hypokalemia - c/w kdur 40meq daily and encouraged high potassium foods. repleted with additional Kdur 40meq in CCL today - repeat BMP friday  #health maintenance - continue MVI - continue calcium citrate - continue PPI - c/w mag oxide 800mg TID - referred to cardiac rehab , encouraged home walking program   RTC/heartcare/TTE q month Time spent with patient 35 minutes

## 2024-03-20 NOTE — CARDIOLOGY SUMMARY
[de-identified] : 3/19/24: CONCLUSIONS:  1. Normal right ventricular cavity size and normal systolic function.  2. Limited echocardiogram performed in the setting of RV biopsy.     No change s/p RV biopsy.  3. Compared to the transthoracic echocardiogram performed on 3/5/2024,.  4. Left ventricular endocardium is not well visualized; however, the left ventricular systolic function appears grossly normal. ________________________________________________________________________________________ FINDINGS: Left ventricular endocardium is not well visualized; however, the left ventricular systolic function appears grossly normal. The right ventricular cavity is normal in size and normal systolic function.There is mild tricuspid regurgitation. Pericardium:Fibrinous material adjacent to the visceral pericardium is seen, suggestive of an inflammatory process. There is a moderate to large pericardial effusion  3.5.24: TTE: CONCLUSIONS:    1. Left ventricular cavity is small. Left ventricular wall thickness is normal. Left ventricular systolic function is mildly decreased with an ejection fraction of 48 % by Chinchilla's method of disks. There are no regional wall motion abnormalities seen.  2. Normal left ventricular diastolic function, with normal filling pressure.  3. Normal right ventricular cavity size, with wall thickness, and reduced systolic function.  4. Compared to the transthoracic echocardiogram performed on 2/20/2024, the pericardial effusion appears similar.  5. Large pericardial effusion noted adjacent to the posterolateral left ventricle, moderate pericardial effusion noted adjacent to the anterior right ventricle and small pericardial effusion noted adjacent to the right atrium with no evidence of hemodynamic compromise (or echocardiographic evidence of cardiac tamponade): greater than 30% respiratory variation across the mitral valve E wave, no diastolic collapse of right atrium, exceding 1/3 of the cardiac cycle, no early diastolic inversion of the right ventricle and respiratory variation across the tricuspid valve E wave is not greater than 60%.  6. There is normal LV mass and concentric remodeling.   2/14: CONCLUSIONS:  1. Left ventricular systolic function is normal with an ejection fraction visually estimated at 60 to 65 %.  2. Normal right ventricular cavity size and reduced systolic function.  3. Consider effusive constrictive pericaridits. 2/4 criterial annulus reversus and >30% trans jhonny flow variation. however IVC not dilated, no septal bounce.  4. Compared to the transthoracic echocardiogram performed on 2/6/2024, there have been no significant changes.  5. Moderate pericardial effusion.  6. The inferior vena cava is normal in size measuring 1.60 cm in diameter, (normal <2.1cm) with abnormal inspiratory collapse (abnormal <50%) consistent with mildly elevated right atrial pressure (~8, range 5-10mmHg).  7. Thickened pericardium. 2/6: TTE: CONCLUSIONS:  1. Limited TTE Pre and Post RV biopsy. No significant changes noted.  2. Left ventricular systolic function is normal with an ejection fraction visually estimated at 55 to 60 %.  3. Normal right ventricular cavity size and reduced systolic function.  4. Trace tricuspid regurgitation.  5. Moderate pericardial effusion with no evidence of hemodynamic compromise (or echocardiographic evidence of cardiac tamponade).  6. Compared to the transthoracic echocardiogram performed on 1/23/2024, The effusion appears larger.. Findings were discussed with Dr. Francesca Richardson on 2/6/2024 at bedside during TTE.  1/9: normal biventricular function 1/2: normal biventricular function [de-identified] : 3/19/24: EKG ST w/ PVC's 109bpm 3/5/24: EKG  bpm 2/20/24: pending 2/6/24: ST 102bpm, MAGDA, right axis (unchanged compared to previous EKG 1/16/24) 1/16/24: ST 103bpm MAGDA, right axis [de-identified] : CT chest abd/pelvis 1/5/24: IMPRESSION: Question mild stercoral colitis. Expected postoperative changes in the chest. No fluid collection.  DSAs: 1/9: 0%  1/2: 0%  Heartcare: not collected yet [de-identified] : 3/19/24: RHC/EMBx ISHLT Grade 1R/1A 2/20/24: RHC/EMBX ISHLT Grade 0R 2/6/24: RHC/EMBx RA 6 PAP 32/18/23 PCW 22 Pasat 70% Juan CO/CI 5.7/2.9, EMBx ISHLT Grade 1R/1A, CD4 negative 1/23/24: RHC/EMBx ISHLT Grade 1R/1A 1/16/24: RHC/EMBx RA 4 PAP 26/10/18 PCW 13 PaSat 63% Juan CO/CI 6/3.1, EMBx ISHLT grade 0; AMR 0 1/9/24: RHC/EMBx: RA 5, RV 19/6/8, PA 21/13/17, PCWP 15, PA sat 68.9, EMBx ISHLT Grade 0R 1/2/24: RHC/EMBx: RA 4 PAP 20/12/15 PCW 15 PaSat 66% Juan CO/CI EMBx ISHLT Grade 0R

## 2024-03-20 NOTE — PHYSICAL EXAM
[No Edema] : no edema [Normal] : no rash, no skin lesions [No Rash] : no rash [Moves all extremities] : moves all extremities [Alert and Oriented] : alert and oriented [de-identified] : +midsternal chest healed

## 2024-03-20 NOTE — END OF VISIT
[FreeTextEntry3] : Patient seen and examined with NP.  I agree with the above with the following exceptions:  RHC plus EM BX today.  RA 4, RV 23/4, PA 23/11(17), PCWP 16, CO/CI 5.2/2.69, PA sat 65.6%. Grade 1A1R, C4d pending. Patient is planned for CT later this week to reassess pericardial effusion accessibility via IR.  Will have an in person clinic visit on Tuesday.  Tacrolimus level today 10.  Further adjustments to medications as above.

## 2024-03-20 NOTE — HISTORY OF PRESENT ILLNESS
[Define hypertension] : define hypertension [Guidelines for blood pressure management] : guidelines for blood pressure management [Processed food] : processed food [Yes, continue with Hypertension plan] : Yes, continue with Hypertension plan [Height: ___] : Height: [unfilled] [Patient will lose 0.5 to 1 lb per week] : Patient will lose 0.5 to 1lb per week [Monitoring of weight at home and at cardiac rehabilitation] : Monitoring of weight at home and at cardiac rehabilitation [Individualized exercise program as developed at cardiac rehabilitation] : Individualized exercise program as developed at cardiac rehabilitation [Weight gain and heart failure implications] : weight gain and heart failure implications [Exercise and diet] : exercise and diet [Yes, continue with Weight Management plan] : Yes, continue with weight management plan [None] : none [Heart transplant recipient] : heart transplant recipient [HLD] : HLD [Age] : age [Diabetes Mellitus] : diabetes mellitus [Overweight/Obesity] : overweight/obesity [Cardiac Rehabilitation] : Cardiac Rehabilitation [___ Days per week] : [unfilled] days per week [>= 31 minutes per session] : >= 31 minutes per session [Target RPE: ___] : Target RPE: [unfilled] [Treadmill] : treadmill [Upright exercise bike] : upright exercise bike [Recumbent bike] : recumbent bike [BioStep] : BioStep [Elliptical] : elliptical [Upper body ergometer] : upper body ergometer [Stair Climber] : stair climber [Walking] : walking [Stretching/ROM exercises and balance exercises daily] : Stretching/ROM exercises and balance exercises daily [Will assess for musculoskeletal and other restrictions] : will assess for musculoskeletal and other restrictions [To increase my time spent in physical activity and/or aerobic exercise] : to increase my time spent in physical activity and/or aerobic exercise [Exercise Benefits/Guidelines education] : exercise benefits/guidelines education [Teach back utilized] : teach back utilized [Yes, per exercise prescription, policy] : Yes, per exercise prescription, policy [Yes, continue with psychosocial plan] : Yes, continue with psychosocial plan [Discuss overview of emotional health supportive modalities] : Discuss overview of emotional health supportive modalities [Assessment] : Assessment [Action] : Action [Diabetes] : Diabetes [Hypertension] : Hypertension [Obesity] : Obesity [] : yes [Sugar] : sugar [Cholesterol] : cholesterol [Sodium] : sodium [Trans fats/saturated fats] : trans fats/saturated fats [Is patient on medication for hyperlipidemia?] : Is patient on medication for hyperlipidemia? Yes [Rosuvastatin] : rosuvastatin [Discuss an overview of healthy eating plan] : Discuss an overview of healthy eating plan [Yes, continue with nutrition plan] : Yes, continue with nutrition plan [In progress] : in progress [FreeTextEntry6] : Intake: stated weight:  [FreeTextEntry1] : Dr. Martins [de-identified] : Intake:  [FreeTextEntry9] : Intake:  [FreeTextEntry8] : Intake:  [FreeTextEntry7] : Improved RYP

## 2024-03-20 NOTE — HISTORY OF PRESENT ILLNESS
[Define hypertension] : define hypertension [Guidelines for blood pressure management] : guidelines for blood pressure management [Processed food] : processed food [Yes, continue with Hypertension plan] : Yes, continue with Hypertension plan [Height: ___] : Height: [unfilled] [Patient will lose 0.5 to 1 lb per week] : Patient will lose 0.5 to 1lb per week [Monitoring of weight at home and at cardiac rehabilitation] : Monitoring of weight at home and at cardiac rehabilitation [Individualized exercise program as developed at cardiac rehabilitation] : Individualized exercise program as developed at cardiac rehabilitation [Weight gain and heart failure implications] : weight gain and heart failure implications [Exercise and diet] : exercise and diet [Yes, continue with Weight Management plan] : Yes, continue with weight management plan [None] : none [Heart transplant recipient] : heart transplant recipient [Age] : age [HLD] : HLD [Diabetes Mellitus] : diabetes mellitus [Overweight/Obesity] : overweight/obesity [Cardiac Rehabilitation] : Cardiac Rehabilitation [___ Days per week] : [unfilled] days per week [>= 31 minutes per session] : >= 31 minutes per session [Target RPE: ___] : Target RPE: [unfilled] [Treadmill] : treadmill [Recumbent bike] : recumbent bike [Upright exercise bike] : upright exercise bike [BioStep] : BioStep [Elliptical] : elliptical [Upper body ergometer] : upper body ergometer [Stair Climber] : stair climber [Walking] : walking [Stretching/ROM exercises and balance exercises daily] : Stretching/ROM exercises and balance exercises daily [Will assess for musculoskeletal and other restrictions] : will assess for musculoskeletal and other restrictions [To increase my time spent in physical activity and/or aerobic exercise] : to increase my time spent in physical activity and/or aerobic exercise [Teach back utilized] : teach back utilized [Exercise Benefits/Guidelines education] : exercise benefits/guidelines education [Yes, per exercise prescription, policy] : Yes, per exercise prescription, policy [Discuss overview of emotional health supportive modalities] : Discuss overview of emotional health supportive modalities [Yes, continue with psychosocial plan] : Yes, continue with psychosocial plan [Assessment] : Assessment [Action] : Action [Diabetes] : Diabetes [Hypertension] : Hypertension [Obesity] : Obesity [] : yes [Sugar] : sugar [Sodium] : sodium [Cholesterol] : cholesterol [Trans fats/saturated fats] : trans fats/saturated fats [Is patient on medication for hyperlipidemia?] : Is patient on medication for hyperlipidemia? Yes [Discuss an overview of healthy eating plan] : Discuss an overview of healthy eating plan [Rosuvastatin] : rosuvastatin [Yes, continue with nutrition plan] : Yes, continue with nutrition plan [In progress] : in progress [FreeTextEntry6] : Intake: stated weight:  [FreeTextEntry1] : Dr. Martins [de-identified] : Intake:  [FreeTextEntry9] : Intake:  [FreeTextEntry8] : Intake:  [FreeTextEntry7] : Improved RYP

## 2024-03-25 ENCOUNTER — OUTPATIENT (OUTPATIENT)
Dept: OUTPATIENT SERVICES | Facility: HOSPITAL | Age: 59
LOS: 1 days | End: 2024-03-25
Payer: COMMERCIAL

## 2024-03-25 ENCOUNTER — APPOINTMENT (OUTPATIENT)
Dept: CT IMAGING | Facility: IMAGING CENTER | Age: 59
End: 2024-03-25
Payer: COMMERCIAL

## 2024-03-25 DIAGNOSIS — Z95.5 PRESENCE OF CORONARY ANGIOPLASTY IMPLANT AND GRAFT: Chronic | ICD-10-CM

## 2024-03-25 DIAGNOSIS — Z94.1 HEART TRANSPLANT STATUS: Chronic | ICD-10-CM

## 2024-03-25 DIAGNOSIS — I31.39 OTHER PERICARDIAL EFFUSION (NONINFLAMMATORY): ICD-10-CM

## 2024-03-25 PROCEDURE — 71250 CT THORAX DX C-: CPT

## 2024-03-25 PROCEDURE — 71250 CT THORAX DX C-: CPT | Mod: 26

## 2024-03-26 ENCOUNTER — APPOINTMENT (OUTPATIENT)
Dept: HEART FAILURE | Facility: CLINIC | Age: 59
End: 2024-03-26
Payer: COMMERCIAL

## 2024-03-26 ENCOUNTER — APPOINTMENT (OUTPATIENT)
Dept: ENDOCRINOLOGY | Facility: CLINIC | Age: 59
End: 2024-03-26
Payer: COMMERCIAL

## 2024-03-26 VITALS
DIASTOLIC BLOOD PRESSURE: 72 MMHG | HEART RATE: 113 BPM | WEIGHT: 184 LBS | OXYGEN SATURATION: 96 % | TEMPERATURE: 98.2 F | SYSTOLIC BLOOD PRESSURE: 101 MMHG | HEIGHT: 67 IN | BODY MASS INDEX: 28.88 KG/M2

## 2024-03-26 VITALS
SYSTOLIC BLOOD PRESSURE: 100 MMHG | HEART RATE: 113 BPM | WEIGHT: 182 LBS | OXYGEN SATURATION: 98 % | DIASTOLIC BLOOD PRESSURE: 70 MMHG | BODY MASS INDEX: 28.56 KG/M2 | HEIGHT: 67 IN

## 2024-03-26 DIAGNOSIS — I10 ESSENTIAL (PRIMARY) HYPERTENSION: ICD-10-CM

## 2024-03-26 LAB
ALBUMIN SERPL ELPH-MCNC: 4.7 G/DL
ALP BLD-CCNC: 60 U/L
ALT SERPL-CCNC: 35 U/L
ANION GAP SERPL CALC-SCNC: 14 MMOL/L
AST SERPL-CCNC: 37 U/L
BILIRUB SERPL-MCNC: 0.3 MG/DL
BUN SERPL-MCNC: 23 MG/DL
CALCIUM SERPL-MCNC: 10.4 MG/DL
CHLORIDE SERPL-SCNC: 97 MMOL/L
CO2 SERPL-SCNC: 26 MMOL/L
CREAT SERPL-MCNC: 1.48 MG/DL
EGFR: 54 ML/MIN/1.73M2
GLUCOSE SERPL-MCNC: 199 MG/DL
HBA1C MFR BLD HPLC: 7.7
POTASSIUM SERPL-SCNC: 3.7 MMOL/L
PROT SERPL-MCNC: 6.9 G/DL
SODIUM SERPL-SCNC: 137 MMOL/L
TACROLIMUS SERPL-MCNC: 14 NG/ML

## 2024-03-26 PROCEDURE — 83036 HEMOGLOBIN GLYCOSYLATED A1C: CPT | Mod: QW

## 2024-03-26 PROCEDURE — 99214 OFFICE O/P EST MOD 30 MIN: CPT

## 2024-03-26 PROCEDURE — ZZZZZ: CPT

## 2024-03-26 PROCEDURE — 95251 CONT GLUC MNTR ANALYSIS I&R: CPT

## 2024-03-26 RX ORDER — SODIUM ZIRCONIUM CYCLOSILICATE 10 G/10G
10 POWDER, FOR SUSPENSION ORAL DAILY
Qty: 30 | Refills: 5 | Status: DISCONTINUED | COMMUNITY
Start: 2024-01-04 | End: 2024-03-26

## 2024-03-26 RX ORDER — TACROLIMUS 1 MG/1
1 CAPSULE ORAL
Qty: 60 | Refills: 5 | Status: DISCONTINUED | COMMUNITY
Start: 2024-01-04 | End: 2024-03-26

## 2024-03-26 RX ORDER — HYDRALAZINE HYDROCHLORIDE 25 MG/1
25 TABLET ORAL 3 TIMES DAILY
Qty: 90 | Refills: 5 | Status: DISCONTINUED | COMMUNITY
Start: 2024-01-04 | End: 2024-03-26

## 2024-03-26 NOTE — REVIEW OF SYSTEMS
[FreeTextEntry2] : 14 point ROS done and found to be negative or noncontributory other than noted in HPI
yes

## 2024-03-26 NOTE — REASON FOR VISIT
05/08/18 0800   Patient Assessment/Suction   Level of Consciousness (AVPU) alert   Respiratory Effort Normal;Unlabored   Expansion/Accessory Muscles/Retractions no use of accessory muscles;no retractions;expansion symmetric   All Lung Fields Breath Sounds clear   Rhythm/Pattern, Respiratory depth regular;pattern regular;unlabored   PRE-TX-O2-ETCO2   O2 Device (Oxygen Therapy) room air   SpO2 (!) 92 %   Pulse 91   Resp 16   Aerosol Therapy   $ Aerosol Therapy Charges PRN treatment not required   Respiratory Treatment Status PRN treatment not required   Inhaler   $ Inhaler Charges Refused  (pt refused Breo for today)   Respiratory Treatment Status refused       Breo q day. Aerosol treatments PRN.    [Initial Evaluation] : an initial evaluation

## 2024-03-26 NOTE — HISTORY OF PRESENT ILLNESS
[FreeTextEntry1] : 59 y/o M with h/o HFrEF, CAD s/p PCI (2008), HTN, DMT2 (A1c 8.3%) and CVA s/p TPA (2018), who is now s/p OHT 12/25/23 (ischemic time: 253min, CMV -/+, Toxo -/-) and presents for routine follow-up/biopsy.   Pt initially presented to [facility] via EMS after syncope reportedly requiring defibrillation, left AMA to come to [facility] on 11/1/23 where he was found to have Covid and intubated for respiratory failure. Then underwent L/RHC on 11/1 revealing  of LAD and RCA, elevated filling pressures and CI of 1.5 which prompted placement of IABP which was subsequently removed c/b PMVT cardiac arrest requiring replacement of IABP on 11/9. During hospitalization, treated for Staph epi, Enterococcus faecalis, Cutibacterium with IV abx.  Pt was evaluated for heart transplant, listed Status 2, ABO A, PRA 0% and on 12/25 a suitable donor was identified and he underwent OHT. His first RHC/EMBx revealed elevated filling pressures and diuretics were adjusted. His course was complicated by leukocytosis, however infectious workup was negative and CT scan revealed significant constipation requiring disimpaction with improvement.  Pt was persistently hyperglycemic, endocrine adjusted insulin regimen. He underwent his second biopsy which revealed normal filling pressures and ISHLT Grade 0R, and was discharged home 1/9/23.  Since post transplant, he has a moderate pericardial effusion that has been unchanged on serial TTE's.   Pt arrives today for routine f/u outpatient visit 3 months post heart transplant. He reports feeling better and more energetic this week. Reports feeling congestion this week not coughing though.  Repeat CT chest was done 3/25/24. Continues taking Linzess a few times a week which helps constipation.  Reports 110-120's/80's at home, FS average 180 mg/dL, taking insulin as prescribed and is being followed by endocrine.  Sleeping well.  Currently denied SOB, CP, palpitations, N/V/D, fever, chills or LE edema. Pt reports 100% compliance with medications. Pt is working as a .

## 2024-03-26 NOTE — CARDIOLOGY SUMMARY
[de-identified] : 3/19/24: EKG ST w/ PVC's 109bpm 3/5/24: EKG  bpm 2/20/24: pending 2/6/24: ST 102bpm, MAGDA, right axis (unchanged compared to previous EKG 1/16/24) 1/16/24: ST 103bpm MAGDA, right axis [de-identified] : 3/19/24: RHC/EMBx ISHLT Grade 1R/1A 2/20/24: RHC/EMBX ISHLT Grade 0R 2/6/24: RHC/EMBx RA 6 PAP 32/18/23 PCW 22 Pasat 70% Juan CO/CI 5.7/2.9, EMBx ISHLT Grade 1R/1A, CD4 negative 1/23/24: RHC/EMBx ISHLT Grade 1R/1A 1/16/24: RHC/EMBx RA 4 PAP 26/10/18 PCW 13 PaSat 63% Juan CO/CI 6/3.1, EMBx ISHLT grade 0; AMR 0 1/9/24: RHC/EMBx: RA 5, RV 19/6/8, PA 21/13/17, PCWP 15, PA sat 68.9, EMBx ISHLT Grade 0R 1/2/24: RHC/EMBx: RA 4 PAP 20/12/15 PCW 15 PaSat 66% Juan CO/CI EMBx ISHLT Grade 0R [de-identified] : 3/19/24: CONCLUSIONS:  1. Normal right ventricular cavity size and normal systolic function.  2. Limited echocardiogram performed in the setting of RV biopsy.     No change s/p RV biopsy.  3. Compared to the transthoracic echocardiogram performed on 3/5/2024,.  4. Left ventricular endocardium is not well visualized; however, the left ventricular systolic function appears grossly normal. ________________________________________________________________________________________ FINDINGS: Left ventricular endocardium is not well visualized; however, the left ventricular systolic function appears grossly normal. The right ventricular cavity is normal in size and normal systolic function.There is mild tricuspid regurgitation. Pericardium:Fibrinous material adjacent to the visceral pericardium is seen, suggestive of an inflammatory process. There is a moderate to large pericardial effusion  3.5.24: TTE: CONCLUSIONS:    1. Left ventricular cavity is small. Left ventricular wall thickness is normal. Left ventricular systolic function is mildly decreased with an ejection fraction of 48 % by Chinchilla's method of disks. There are no regional wall motion abnormalities seen.  2. Normal left ventricular diastolic function, with normal filling pressure.  3. Normal right ventricular cavity size, with wall thickness, and reduced systolic function.  4. Compared to the transthoracic echocardiogram performed on 2/20/2024, the pericardial effusion appears similar.  5. Large pericardial effusion noted adjacent to the posterolateral left ventricle, moderate pericardial effusion noted adjacent to the anterior right ventricle and small pericardial effusion noted adjacent to the right atrium with no evidence of hemodynamic compromise (or echocardiographic evidence of cardiac tamponade): greater than 30% respiratory variation across the mitral valve E wave, no diastolic collapse of right atrium, exceding 1/3 of the cardiac cycle, no early diastolic inversion of the right ventricle and respiratory variation across the tricuspid valve E wave is not greater than 60%.  6. There is normal LV mass and concentric remodeling.   2/14: CONCLUSIONS:  1. Left ventricular systolic function is normal with an ejection fraction visually estimated at 60 to 65 %.  2. Normal right ventricular cavity size and reduced systolic function.  3. Consider effusive constrictive pericaridits. 2/4 criterial annulus reversus and >30% trans jhonny flow variation. however IVC not dilated, no septal bounce.  4. Compared to the transthoracic echocardiogram performed on 2/6/2024, there have been no significant changes.  5. Moderate pericardial effusion.  6. The inferior vena cava is normal in size measuring 1.60 cm in diameter, (normal <2.1cm) with abnormal inspiratory collapse (abnormal <50%) consistent with mildly elevated right atrial pressure (~8, range 5-10mmHg).  7. Thickened pericardium. 2/6: TTE: CONCLUSIONS:  1. Limited TTE Pre and Post RV biopsy. No significant changes noted.  2. Left ventricular systolic function is normal with an ejection fraction visually estimated at 55 to 60 %.  3. Normal right ventricular cavity size and reduced systolic function.  4. Trace tricuspid regurgitation.  5. Moderate pericardial effusion with no evidence of hemodynamic compromise (or echocardiographic evidence of cardiac tamponade).  6. Compared to the transthoracic echocardiogram performed on 1/23/2024, The effusion appears larger.. Findings were discussed with Dr. Francesca Richardson on 2/6/2024 at bedside during TTE.  1/9: normal biventricular function 1/2: normal biventricular function [de-identified] : CT chest abd/pelvis 1/5/24: IMPRESSION: Question mild stercoral colitis. Expected postoperative changes in the chest. No fluid collection.  DSAs: 1/9: 0%  1/2: 0%  Heartcare: not collected yet

## 2024-03-26 NOTE — PHYSICAL EXAM
[No Edema] : no edema [No Rash] : no rash [Normal] : no rash, no skin lesions [Moves all extremities] : moves all extremities [Alert and Oriented] : alert and oriented [de-identified] : +midsternal chest healed

## 2024-03-26 NOTE — REVIEW OF SYSTEMS
[As Noted in HPI] : as noted in HPI [Chest Pain] : no chest pain [Shortness Of Breath] : no shortness of breath [Abdominal Pain] : no abdominal pain [Negative] : Eyes

## 2024-04-01 ENCOUNTER — NON-APPOINTMENT (OUTPATIENT)
Age: 59
End: 2024-04-01

## 2024-04-04 ENCOUNTER — LABORATORY RESULT (OUTPATIENT)
Age: 59
End: 2024-04-04

## 2024-04-05 LAB
ALBUMIN SERPL ELPH-MCNC: 4.7 G/DL
ALP BLD-CCNC: 54 U/L
ALT SERPL-CCNC: 22 U/L
ANION GAP SERPL CALC-SCNC: 17 MMOL/L
AST SERPL-CCNC: 27 U/L
BASOPHILS # BLD AUTO: 0.03 K/UL
BASOPHILS NFR BLD AUTO: 0.9 %
BILIRUB SERPL-MCNC: 0.3 MG/DL
BUN SERPL-MCNC: 24 MG/DL
CALCIUM SERPL-MCNC: 10.3 MG/DL
CHLORIDE SERPL-SCNC: 96 MMOL/L
CO2 SERPL-SCNC: 24 MMOL/L
CREAT SERPL-MCNC: 1.4 MG/DL
EGFR: 58 ML/MIN/1.73M2
EOSINOPHIL # BLD AUTO: 0.13 K/UL
EOSINOPHIL NFR BLD AUTO: 3.5 %
GLUCOSE SERPL-MCNC: 281 MG/DL
HCT VFR BLD CALC: 43.9 %
HGB BLD-MCNC: 14.7 G/DL
LYMPHOCYTES # BLD AUTO: 1.37 K/UL
LYMPHOCYTES NFR BLD AUTO: 36.5 %
MAGNESIUM SERPL-MCNC: 1.7 MG/DL
MAN DIFF?: NORMAL
MCHC RBC-ENTMCNC: 29.3 PG
MCHC RBC-ENTMCNC: 33.5 GM/DL
MCV RBC AUTO: 87.6 FL
MONOCYTES # BLD AUTO: 0.29 K/UL
MONOCYTES NFR BLD AUTO: 7.8 %
NEUTROPHILS # BLD AUTO: 1.86 K/UL
NEUTROPHILS NFR BLD AUTO: 49.6 %
PLATELET # BLD AUTO: 236 K/UL
POTASSIUM SERPL-SCNC: 3.8 MMOL/L
PROT SERPL-MCNC: 6.5 G/DL
RBC # BLD: 5.01 M/UL
RBC # FLD: 14.2 %
SODIUM SERPL-SCNC: 137 MMOL/L
TACROLIMUS SERPL-MCNC: 26.4 NG/ML
WBC # FLD AUTO: 3.74 K/UL

## 2024-04-08 ENCOUNTER — LABORATORY RESULT (OUTPATIENT)
Age: 59
End: 2024-04-08

## 2024-04-09 LAB
ALBUMIN SERPL ELPH-MCNC: 4.6 G/DL
ALP BLD-CCNC: 54 U/L
ALT SERPL-CCNC: 24 U/L
ANION GAP SERPL CALC-SCNC: 14 MMOL/L
AST SERPL-CCNC: 34 U/L
BASOPHILS # BLD AUTO: 0.04 K/UL
BASOPHILS NFR BLD AUTO: 1 %
BILIRUB SERPL-MCNC: 0.4 MG/DL
BUN SERPL-MCNC: 22 MG/DL
CALCIUM SERPL-MCNC: 10.3 MG/DL
CHLORIDE SERPL-SCNC: 97 MMOL/L
CO2 SERPL-SCNC: 28 MMOL/L
CREAT SERPL-MCNC: 1.63 MG/DL
EGFR: 48 ML/MIN/1.73M2
EOSINOPHIL # BLD AUTO: 0.35 K/UL
EOSINOPHIL NFR BLD AUTO: 8 %
GLUCOSE SERPL-MCNC: 246 MG/DL
HCT VFR BLD CALC: 42.5 %
HGB BLD-MCNC: 14.4 G/DL
LYMPHOCYTES # BLD AUTO: 1.74 K/UL
LYMPHOCYTES NFR BLD AUTO: 40 %
MAGNESIUM SERPL-MCNC: 1.6 MG/DL
MAN DIFF?: NORMAL
MCHC RBC-ENTMCNC: 29.4 PG
MCHC RBC-ENTMCNC: 33.9 GM/DL
MCV RBC AUTO: 86.9 FL
MONOCYTES # BLD AUTO: 0.52 K/UL
MONOCYTES NFR BLD AUTO: 12 %
NEUTROPHILS # BLD AUTO: 1.7 K/UL
NEUTROPHILS NFR BLD AUTO: 39 %
PLATELET # BLD AUTO: 241 K/UL
POTASSIUM SERPL-SCNC: 4 MMOL/L
PROT SERPL-MCNC: 6.5 G/DL
RBC # BLD: 4.89 M/UL
RBC # FLD: 14 %
SODIUM SERPL-SCNC: 139 MMOL/L
TACROLIMUS SERPL-MCNC: 12.5 NG/ML
WBC # FLD AUTO: 4.35 K/UL

## 2024-04-12 ENCOUNTER — NON-APPOINTMENT (OUTPATIENT)
Age: 59
End: 2024-04-12

## 2024-04-13 LAB — HBA1C MFR BLD HPLC: 7.7

## 2024-04-22 ENCOUNTER — APPOINTMENT (OUTPATIENT)
Dept: THORACIC SURGERY | Facility: CLINIC | Age: 59
End: 2024-04-22
Payer: COMMERCIAL

## 2024-04-22 ENCOUNTER — NON-APPOINTMENT (OUTPATIENT)
Age: 59
End: 2024-04-22

## 2024-04-22 VITALS
SYSTOLIC BLOOD PRESSURE: 102 MMHG | DIASTOLIC BLOOD PRESSURE: 72 MMHG | OXYGEN SATURATION: 97 % | HEART RATE: 107 BPM | RESPIRATION RATE: 17 BRPM | BODY MASS INDEX: 27.95 KG/M2 | WEIGHT: 188.7 LBS | HEIGHT: 69 IN

## 2024-04-22 DIAGNOSIS — I31.39 OTHER PERICARDIAL EFFUSION (NONINFLAMMATORY): ICD-10-CM

## 2024-04-22 PROCEDURE — 99215 OFFICE O/P EST HI 40 MIN: CPT

## 2024-04-22 PROCEDURE — 99205 OFFICE O/P NEW HI 60 MIN: CPT

## 2024-04-22 NOTE — PHYSICAL EXAM
[Restricted in physically strenuous activity but ambulatory and able to carry out work of a light or sedentary nature] : Status 1- Restricted in physically strenuous activity but ambulatory and able to carry out work of a light or sedentary nature, e.g., light house work, office work [General Appearance - Alert] : alert [General Appearance - In No Acute Distress] : in no acute distress [Sclera] : the sclera and conjunctiva were normal [PERRL With Normal Accommodation] : pupils were equal in size, round, and reactive to light [Extraocular Movements] : extraocular movements were intact [Outer Ear] : the ears and nose were normal in appearance [Oropharynx] : the oropharynx was normal [Neck Appearance] : the appearance of the neck was normal [Neck Cervical Mass (___cm)] : no neck mass was observed [Jugular Venous Distention Increased] : there was no jugular-venous distention [Thyroid Diffuse Enlargement] : the thyroid was not enlarged [Thyroid Nodule] : there were no palpable thyroid nodules [Auscultation Breath Sounds / Voice Sounds] : lungs were clear to auscultation bilaterally [Heart Rate And Rhythm] : heart rate was normal and rhythm regular [Heart Sounds] : normal S1 and S2 [Heart Sounds Gallop] : no gallops [Murmurs] : no murmurs [Heart Sounds Pericardial Friction Rub] : no pericardial rub [Examination Of The Chest] : the chest was normal in appearance [Chest Visual Inspection Thoracic Asymmetry] : no chest asymmetry [Diminished Respiratory Excursion] : normal chest expansion [2+] : left 2+ [Breast Appearance] : normal in appearance [Breast Palpation Mass] : no palpable masses [Bowel Sounds] : normal bowel sounds [Abdomen Soft] : soft [Abdomen Tenderness] : non-tender [Abdomen Mass (___ Cm)] : no abdominal mass palpated [Penis Abnormality] : the penis was normal [Scrotum] : the scrotum was normal [Testes Swelling] : the testicles were not swollen [Testes Mass (___cm)] : there were no testicular masses [Cervical Lymph Nodes Enlarged Posterior Bilaterally] : posterior cervical [Cervical Lymph Nodes Enlarged Anterior Bilaterally] : anterior cervical [Supraclavicular Lymph Nodes Enlarged Bilaterally] : supraclavicular [Femoral Lymph Nodes Enlarged Bilaterally] : femoral [Axillary Lymph Nodes Enlarged Bilaterally] : axillary [Inguinal Lymph Nodes Enlarged Bilaterally] : inguinal [No CVA Tenderness] : no ~M costovertebral angle tenderness [No Spinal Tenderness] : no spinal tenderness [Abnormal Walk] : normal gait [Nail Clubbing] : no clubbing  or cyanosis of the fingernails [Musculoskeletal - Swelling] : no joint swelling seen [Motor Tone] : muscle strength and tone were normal [Skin Color & Pigmentation] : normal skin color and pigmentation [Skin Turgor] : normal skin turgor [] : no rash [Deep Tendon Reflexes (DTR)] : deep tendon reflexes were 2+ and symmetric [Sensation] : the sensory exam was normal to light touch and pinprick [No Focal Deficits] : no focal deficits [Oriented To Time, Place, And Person] : oriented to person, place, and time [Impaired Insight] : insight and judgment were intact [Affect] : the affect was normal

## 2024-04-22 NOTE — HISTORY OF PRESENT ILLNESS
[FreeTextEntry1] : Mr. DEVORAH VALENCIA, 58 year old male, never smoker, w/ hx of HLD, DMII, CAD, CVA in 2018, CHF, s/p heart transplant in Dec 2023, who presented with pericardial effusion since the transplant.   CT Chest on 3/25/24: - There is moderate pericardial effusion measuring about 20 Hounsfield units on CT attenuation, with no significant change compared to prior study - status post median sternotomy - there are old healed left rib fractures  Pt presents today for CT Sx consultation, referred by Dr. Barboza. Pt reports asymptomatic, denies fever, chills, SOB, cough, hemoptysis, CP, pain or recent illness.

## 2024-04-22 NOTE — CONSULT LETTER
[Dear  ___] : Dear  [unfilled], [Consult Letter:] : I had the pleasure of evaluating your patient, [unfilled]. [Please see my note below.] : Please see my note below. [Consult Closing:] : Thank you very much for allowing me to participate in the care of this patient.  If you have any questions, please do not hesitate to contact me. [Sincerely,] : Sincerely, [FreeTextEntry2] : Dr. Barboza  [FreeTextEntry3] : Pratibha Reyna MD Attending Surgeon Division of Thoracic Surgery , [facility] [employer] [facility]

## 2024-04-22 NOTE — ASSESSMENT
[FreeTextEntry1] : Mr. DEVORAH VALENCIA, 58 year old male, never smoker, w/ hx of HLD, DMII, CAD, CVA in 2018, CHF, s/p heart transplant in Dec 2023, who presented with pericardial effusion since the transplant.   CT Chest on 3/25/24: - There is moderate pericardial effusion measuring about 20 Hounsfield units on CT attenuation, with no significant change compared to prior study - status post median sternotomy - there are old healed left rib fractures  I have reviewed the patient's medical records and diagnostic images at time of this office consultation and have made the following recommendation: 1. CT reviewed, pericardial effusion is stable or even slightly better, I would like to continue surveillance for now. CT is from March, repeat now to assess the stability.    [facility]IV[facility] evaluation and management (E/M) services for this new patient.  That E/M includes conducting the initial examination, assessing all conditions, and establishing the plan of care.  Today, my ACP, JOANNE Wagner was here to observe my evaluation and management services for this patient to be followed going forward.

## 2024-05-01 NOTE — PROGRESS NOTE ADULT - PROBLEM SELECTOR PLAN 2
Left voicemail for Tiffani from [employer] to return call. Phone number provided, awaiting response.   - PMVT into VF arrest 11/8, 3 rounds with shocks  - Transitioned from IV Lidocaine infusion to PO Amio load 11/14  - IABP support as above  - EP following.

## 2024-05-03 ENCOUNTER — APPOINTMENT (OUTPATIENT)
Dept: CV DIAGNOSITCS | Facility: HOSPITAL | Age: 59
End: 2024-05-03

## 2024-05-03 ENCOUNTER — APPOINTMENT (OUTPATIENT)
Dept: HEART FAILURE | Facility: CLINIC | Age: 59
End: 2024-05-03

## 2024-05-03 ENCOUNTER — RESULT REVIEW (OUTPATIENT)
Age: 59
End: 2024-05-03

## 2024-05-03 ENCOUNTER — APPOINTMENT (OUTPATIENT)
Dept: HEART FAILURE | Facility: CLINIC | Age: 59
End: 2024-05-03
Payer: COMMERCIAL

## 2024-05-03 ENCOUNTER — OUTPATIENT (OUTPATIENT)
Dept: OUTPATIENT SERVICES | Facility: HOSPITAL | Age: 59
LOS: 1 days | End: 2024-05-03
Payer: COMMERCIAL

## 2024-05-03 DIAGNOSIS — Z94.1 HEART TRANSPLANT STATUS: ICD-10-CM

## 2024-05-03 DIAGNOSIS — Z94.1 HEART TRANSPLANT STATUS: Chronic | ICD-10-CM

## 2024-05-03 DIAGNOSIS — Z95.5 PRESENCE OF CORONARY ANGIOPLASTY IMPLANT AND GRAFT: Chronic | ICD-10-CM

## 2024-05-03 PROCEDURE — 99214 OFFICE O/P EST MOD 30 MIN: CPT

## 2024-05-03 PROCEDURE — 93308 TTE F-UP OR LMTD: CPT | Mod: 26

## 2024-05-03 PROCEDURE — 93306 TTE W/DOPPLER COMPLETE: CPT

## 2024-05-03 NOTE — CARDIOLOGY SUMMARY
[de-identified] : 3/19/24: EKG ST w/ PVC's 109bpm 3/5/24: EKG  bpm 2/20/24: pending 2/6/24: ST 102bpm, MAGDA, right axis (unchanged compared to previous EKG 1/16/24) 1/16/24: ST 103bpm MAGDA, right axis [de-identified] : 3/19/24: CONCLUSIONS:  1. Normal right ventricular cavity size and normal systolic function.  2. Limited echocardiogram performed in the setting of RV biopsy.     No change s/p RV biopsy.  3. Compared to the transthoracic echocardiogram performed on 3/5/2024,.  4. Left ventricular endocardium is not well visualized; however, the left ventricular systolic function appears grossly normal. ________________________________________________________________________________________ FINDINGS: Left ventricular endocardium is not well visualized; however, the left ventricular systolic function appears grossly normal. The right ventricular cavity is normal in size and normal systolic function.There is mild tricuspid regurgitation. Pericardium:Fibrinous material adjacent to the visceral pericardium is seen, suggestive of an inflammatory process. There is a moderate to large pericardial effusion  3.5.24: TTE: CONCLUSIONS:    1. Left ventricular cavity is small. Left ventricular wall thickness is normal. Left ventricular systolic function is mildly decreased with an ejection fraction of 48 % by Chinchilla's method of disks. There are no regional wall motion abnormalities seen.  2. Normal left ventricular diastolic function, with normal filling pressure.  3. Normal right ventricular cavity size, with wall thickness, and reduced systolic function.  4. Compared to the transthoracic echocardiogram performed on 2/20/2024, the pericardial effusion appears similar.  5. Large pericardial effusion noted adjacent to the posterolateral left ventricle, moderate pericardial effusion noted adjacent to the anterior right ventricle and small pericardial effusion noted adjacent to the right atrium with no evidence of hemodynamic compromise (or echocardiographic evidence of cardiac tamponade): greater than 30% respiratory variation across the mitral valve E wave, no diastolic collapse of right atrium, exceding 1/3 of the cardiac cycle, no early diastolic inversion of the right ventricle and respiratory variation across the tricuspid valve E wave is not greater than 60%.  6. There is normal LV mass and concentric remodeling.   2/14: CONCLUSIONS:  1. Left ventricular systolic function is normal with an ejection fraction visually estimated at 60 to 65 %.  2. Normal right ventricular cavity size and reduced systolic function.  3. Consider effusive constrictive pericaridits. 2/4 criterial annulus reversus and >30% trans jhonny flow variation. however IVC not dilated, no septal bounce.  4. Compared to the transthoracic echocardiogram performed on 2/6/2024, there have been no significant changes.  5. Moderate pericardial effusion.  6. The inferior vena cava is normal in size measuring 1.60 cm in diameter, (normal <2.1cm) with abnormal inspiratory collapse (abnormal <50%) consistent with mildly elevated right atrial pressure (~8, range 5-10mmHg).  7. Thickened pericardium. 2/6: TTE: CONCLUSIONS:  1. Limited TTE Pre and Post RV biopsy. No significant changes noted.  2. Left ventricular systolic function is normal with an ejection fraction visually estimated at 55 to 60 %.  3. Normal right ventricular cavity size and reduced systolic function.  4. Trace tricuspid regurgitation.  5. Moderate pericardial effusion with no evidence of hemodynamic compromise (or echocardiographic evidence of cardiac tamponade).  6. Compared to the transthoracic echocardiogram performed on 1/23/2024, The effusion appears larger.. Findings were discussed with Dr. Francesca Richardson on 2/6/2024 at bedside during TTE.  1/9: normal biventricular function 1/2: normal biventricular function [de-identified] : 3/19/24: RHC/EMBx ISHLT Grade 1R/1A 2/20/24: RHC/EMBX ISHLT Grade 0R 2/6/24: RHC/EMBx RA 6 PAP 32/18/23 PCW 22 Pasat 70% Juan CO/CI 5.7/2.9, EMBx ISHLT Grade 1R/1A, CD4 negative 1/23/24: RHC/EMBx ISHLT Grade 1R/1A 1/16/24: RHC/EMBx RA 4 PAP 26/10/18 PCW 13 PaSat 63% Juan CO/CI 6/3.1, EMBx ISHLT grade 0; [facility] 0 1/9/24: RHC/EMBx: RA 5, RV 19/6/8, PA 21/13/17, PCWP 15, PA sat 68.9, EMBx ISHLT Grade 0R 1/2/24: RHC/EMBx: RA 4 PAP 20/12/15 PCW 15 PaSat 66% Juan CO/CI EMBx ISHLT Grade 0R [de-identified] : CT chest abd/pelvis 1/5/24: IMPRESSION: Question mild stercoral colitis. Expected postoperative changes in the chest. No fluid collection.  DSAs: 1/9: 0%  1/2: 0%  Heartcare: not collected yet

## 2024-05-03 NOTE — HISTORY OF PRESENT ILLNESS
[FreeTextEntry1] : 57 y/o M with h/o HFrEF, CAD s/p PCI (2008), HTN, DMT2 (A1c 8.3%) and CVA s/p TPA (2018), who is now s/p OHT 12/25/23 (ischemic time: 253min, CMV -/+, Toxo -/-) and presents for routine follow-up/biopsy.   Pt initially presented to [facility] via EMS after syncope reportedly requiring defibrillation, left AMA to come to [facility] on 11/1/23 where he was found to have Covid and intubated for respiratory failure. Then underwent L/RHC on 11/1 revealing  of LAD and RCA, elevated filling pressures and CI of 1.5 which prompted placement of IABP which was subsequently removed c/b PMVT cardiac arrest requiring replacement of IABP on 11/9. During hospitalization, treated for Staph epi, Enterococcus faecalis, Cutibacterium with IV abx.  Pt was evaluated for heart transplant, listed Status 2, ABO A, PRA 0% and on 12/25 a suitable donor was identified and he underwent OHT. His first RHC/EMBx revealed elevated filling pressures and diuretics were adjusted. His course was complicated by leukocytosis, however infectious workup was negative and CT scan revealed significant constipation requiring disimpaction with improvement.  Pt was persistently hyperglycemic, endocrine adjusted insulin regimen. He underwent his second biopsy which revealed normal filling pressures and ISHLT Grade 0R, and was discharged home 1/9/23.  Since post transplant, he has a moderate pericardial effusion that has been unchanged on serial TTE's.   Pt arrives today for routine f/u outpatient visit 4 months post heart transplant. He reports feeling He was seen by Dr Reyna and repeat Chest CT ordered. Repeat CT chest was done 3/25/24. Continues taking Linzess a few times a week which helps constipation.  Reports 110-120's/80's at home, FS average 180 mg/dL, taking insulin as prescribed and is being followed by endocrine.  Sleeping well.  Currently denied SOB, CP, palpitations, N/V/D, fever, chills or LE edema. Pt reports 100% compliance with medications. Pt is working as a .

## 2024-05-03 NOTE — PHYSICAL EXAM
[No Edema] : no edema [Normal] : no rash, no skin lesions [No Rash] : no rash [Moves all extremities] : moves all extremities [Alert and Oriented] : alert and oriented [de-identified] : +midsternal chest healed

## 2024-05-03 NOTE — DISCUSSION/SUMMARY
[___ Week(s)] : in [unfilled] week(s) [FreeTextEntry1] : # s/p Heart transplant 12/25/23 - last RHC/EMBx 3/19/24 no rejection - currently off diuretics  - last TTE demonstrated normal findings - c/w ASA 81mg po daily now  - c/w statin - This patient has a heart allograft and is at risk for rejection through immune system recognition of non-self tissue. AlloMap and AlloSure and noninvasive tests ordered to evaluate for the probability of rejection after pretest and assist in immunosuppression management. Studies have shown that these tests can help to rule out rejection without subjecting the patient to the bleeding, arrhythmia, and structural damage risks of invasive endomyocardial biopsies. The AlloMap and AlloSure tests help guide clinical decision making for this patient's surveillance schedule and immunosuppression adjustments. - check heartcare monthly. 3/6/24 allosure = 0.06  #Immunosuppression - s/p Simulect on POD 12/25 and received second dose on POD 4 (12/29). - Continue with prednisone taper based on protocol  - Continue with Cellcept 1g PO BID - Will continue to adjust tacro as needed for goal 10-12  # Prophylactic antibiotic  - CMV -/+: intermediate risk.  Continue Valcyte 450 mg PO BID. Check CMV PCR monthly (not detected 2/6/24) - Toxo -/-: none required - PJP ppx: continue Bactrim S/S PO QD - Trush ppx: completed fluconazole  - donor HCV TIM - but HCV antibody +. Check 1 month [facility] labs (not detected 1/25/24), then 3 mo, 12 mo  # ARIC (acute kidney injury) - pre transplant baseline Cr 1.2, peaked to 4 - monitor closely, avoid nephrotoxic agents - no need for diuretics based on last RHC  #moderate pericardial effusion - continue to monitor - c/w colchicine 0.6mg daily and bumex 1mg daily (both started on 2/27/24) - f/u repeat noncontrast chest CT now. re-discuss with IR vs CTS if intervention is needed  #HTN, improved - continue nifedipine 60mg bid - discontinue hydralazine 25mg TID  #Constipation - CT abd/pelvis showed stercoral colitis 1/5 - S/p MgCO3, lactulose 20 QID +/- golytely  - GI consulted and signed off  - Can also use bisacodyl suppository if pt amenable  - Encourage ambulation  # Hyperglycemia, steroid induced.  - Endo following, mango placed - c/w lantus 29 units qhs and ISS - recommended to continue following diabetic diet  #hypokalemia - c/w kdur 40meq daily and encouraged high potassium foods. repleted with additional Kdur 40meq in CCL today  #health maintenance - continue MVI - continue calcium citrate - continue PPI - c/w mag oxide 800mg TID - referred to cardiac rehab , encouraged home walking program   RTC/heartcare/TTE q month Time spent with patient 35 minutes

## 2024-05-07 ENCOUNTER — APPOINTMENT (OUTPATIENT)
Dept: ENDOCRINOLOGY | Facility: CLINIC | Age: 59
End: 2024-05-07
Payer: COMMERCIAL

## 2024-05-07 VITALS
SYSTOLIC BLOOD PRESSURE: 134 MMHG | HEART RATE: 114 BPM | WEIGHT: 185 LBS | OXYGEN SATURATION: 96 % | BODY MASS INDEX: 27.4 KG/M2 | DIASTOLIC BLOOD PRESSURE: 88 MMHG | HEIGHT: 69 IN

## 2024-05-07 DIAGNOSIS — E16.2 HYPOGLYCEMIA, UNSPECIFIED: ICD-10-CM

## 2024-05-07 DIAGNOSIS — E11.65 TYPE 2 DIABETES MELLITUS WITH HYPERGLYCEMIA: ICD-10-CM

## 2024-05-07 DIAGNOSIS — E78.5 HYPERLIPIDEMIA, UNSPECIFIED: ICD-10-CM

## 2024-05-07 DIAGNOSIS — R73.9 HYPERGLYCEMIA, UNSPECIFIED: ICD-10-CM

## 2024-05-07 PROCEDURE — 95251 CONT GLUC MNTR ANALYSIS I&R: CPT

## 2024-05-07 PROCEDURE — 99214 OFFICE O/P EST MOD 30 MIN: CPT

## 2024-05-10 LAB
ALBUMIN SERPL ELPH-MCNC: 5.2 G/DL
ALP BLD-CCNC: 63 U/L
ALT SERPL-CCNC: 17 U/L
ANION GAP SERPL CALC-SCNC: 17 MMOL/L
AST SERPL-CCNC: 23 U/L
BASOPHILS # BLD AUTO: 0.05 K/UL
BASOPHILS NFR BLD AUTO: 0.9 %
BILIRUB SERPL-MCNC: 0.4 MG/DL
BUN SERPL-MCNC: 24 MG/DL
CALCIUM SERPL-MCNC: 10.5 MG/DL
CHLORIDE SERPL-SCNC: 98 MMOL/L
CMV DNA SPEC QL NAA+PROBE: NOT DETECTED IU/ML
CMVPCR LOG: NOT DETECTED LOG10IU/ML
CO2 SERPL-SCNC: 24 MMOL/L
CREAT SERPL-MCNC: 1.58 MG/DL
EGFR: 50 ML/MIN/1.73M2
EOSINOPHIL # BLD AUTO: 0.4 K/UL
EOSINOPHIL NFR BLD AUTO: 7.1 %
GLUCOSE SERPL-MCNC: 169 MG/DL
HCT VFR BLD CALC: 44.2 %
HGB BLD-MCNC: 14.8 G/DL
IMM GRANULOCYTES NFR BLD AUTO: 4.8 %
LYMPHOCYTES # BLD AUTO: 1.8 K/UL
LYMPHOCYTES NFR BLD AUTO: 32 %
MAGNESIUM SERPL-MCNC: 1.7 MG/DL
MAN DIFF?: NORMAL
MCHC RBC-ENTMCNC: 29.8 PG
MCHC RBC-ENTMCNC: 33.5 GM/DL
MCV RBC AUTO: 88.9 FL
MONOCYTES # BLD AUTO: 0.41 K/UL
MONOCYTES NFR BLD AUTO: 7.3 %
NEUTROPHILS # BLD AUTO: 2.69 K/UL
NEUTROPHILS NFR BLD AUTO: 47.9 %
PLATELET # BLD AUTO: 231 K/UL
POTASSIUM SERPL-SCNC: 3.1 MMOL/L
PROT SERPL-MCNC: 7.6 G/DL
RBC # BLD: 4.97 M/UL
RBC # FLD: 14.3 %
SODIUM SERPL-SCNC: 139 MMOL/L
TACROLIMUS SERPL-MCNC: 11.3 NG/ML
WBC # FLD AUTO: 5.62 K/UL

## 2024-05-14 ENCOUNTER — NON-APPOINTMENT (OUTPATIENT)
Age: 59
End: 2024-05-14

## 2024-05-17 ENCOUNTER — NON-APPOINTMENT (OUTPATIENT)
Age: 59
End: 2024-05-17

## 2024-05-17 LAB
ALBUMIN SERPL ELPH-MCNC: 4.6 G/DL
ALP BLD-CCNC: 57 U/L
ALT SERPL-CCNC: 17 U/L
ANION GAP SERPL CALC-SCNC: 17 MMOL/L
AST SERPL-CCNC: 22 U/L
BASOPHILS # BLD AUTO: 0.03 K/UL
BASOPHILS NFR BLD AUTO: 0.6 %
BILIRUB SERPL-MCNC: 0.2 MG/DL
BUN SERPL-MCNC: 23 MG/DL
CALCIUM SERPL-MCNC: 10.2 MG/DL
CHLORIDE SERPL-SCNC: 101 MMOL/L
CO2 SERPL-SCNC: 22 MMOL/L
CREAT SERPL-MCNC: 1.5 MG/DL
EGFR: 53 ML/MIN/1.73M2
EOSINOPHIL # BLD AUTO: 0.21 K/UL
EOSINOPHIL NFR BLD AUTO: 4.2 %
GLUCOSE SERPL-MCNC: 183 MG/DL
HCT VFR BLD CALC: 40.8 %
HGB BLD-MCNC: 14 G/DL
IMM GRANULOCYTES NFR BLD AUTO: 2.8 %
LYMPHOCYTES # BLD AUTO: 1.6 K/UL
LYMPHOCYTES NFR BLD AUTO: 32.1 %
MAGNESIUM SERPL-MCNC: 1.8 MG/DL
MAN DIFF?: NORMAL
MCHC RBC-ENTMCNC: 30 PG
MCHC RBC-ENTMCNC: 34.3 GM/DL
MCV RBC AUTO: 87.6 FL
MONOCYTES # BLD AUTO: 0.39 K/UL
MONOCYTES NFR BLD AUTO: 7.8 %
NEUTROPHILS # BLD AUTO: 2.62 K/UL
NEUTROPHILS NFR BLD AUTO: 52.5 %
PLATELET # BLD AUTO: 204 K/UL
POTASSIUM SERPL-SCNC: 3.4 MMOL/L
PROT SERPL-MCNC: 6.8 G/DL
RBC # BLD: 4.66 M/UL
RBC # FLD: 14 %
SODIUM SERPL-SCNC: 139 MMOL/L
TACROLIMUS SERPL-MCNC: 15 NG/ML
WBC # FLD AUTO: 4.99 K/UL

## 2024-05-17 RX ORDER — BUMETANIDE 1 MG/1
1 TABLET ORAL DAILY
Qty: 30 | Refills: 5 | Status: COMPLETED | COMMUNITY
Start: 2024-02-21 | End: 2024-05-03

## 2024-05-17 RX ORDER — COLCHICINE 0.6 MG/1
0.6 TABLET ORAL
Qty: 30 | Refills: 5 | Status: COMPLETED | COMMUNITY
Start: 2024-02-21 | End: 2024-05-03

## 2024-06-04 ENCOUNTER — APPOINTMENT (OUTPATIENT)
Dept: INFECTIOUS DISEASE | Facility: CLINIC | Age: 59
End: 2024-06-04

## 2024-06-13 RX ORDER — INSULIN GLARGINE 100 [IU]/ML
100 INJECTION, SOLUTION SUBCUTANEOUS
Qty: 15 | Refills: 5 | Status: ACTIVE | COMMUNITY
Start: 2024-01-04 | End: 1900-01-01

## 2024-06-18 ENCOUNTER — RESULT REVIEW (OUTPATIENT)
Age: 59
End: 2024-06-18

## 2024-06-18 ENCOUNTER — OUTPATIENT (OUTPATIENT)
Dept: OUTPATIENT SERVICES | Facility: HOSPITAL | Age: 59
LOS: 1 days | End: 2024-06-18
Payer: COMMERCIAL

## 2024-06-18 ENCOUNTER — APPOINTMENT (OUTPATIENT)
Dept: HEART FAILURE | Facility: CLINIC | Age: 59
End: 2024-06-18
Payer: COMMERCIAL

## 2024-06-18 ENCOUNTER — APPOINTMENT (OUTPATIENT)
Dept: CV DIAGNOSITCS | Facility: HOSPITAL | Age: 59
End: 2024-06-18

## 2024-06-18 VITALS
TEMPERATURE: 98 F | HEIGHT: 69 IN | WEIGHT: 202 LBS | OXYGEN SATURATION: 96 % | HEART RATE: 113 BPM | SYSTOLIC BLOOD PRESSURE: 115 MMHG | BODY MASS INDEX: 29.92 KG/M2 | DIASTOLIC BLOOD PRESSURE: 80 MMHG

## 2024-06-18 DIAGNOSIS — Z94.1 HEART TRANSPLANT STATUS: ICD-10-CM

## 2024-06-18 DIAGNOSIS — A09 INFECTIOUS GASTROENTERITIS AND COLITIS, UNSPECIFIED: ICD-10-CM

## 2024-06-18 DIAGNOSIS — Z95.5 PRESENCE OF CORONARY ANGIOPLASTY IMPLANT AND GRAFT: Chronic | ICD-10-CM

## 2024-06-18 DIAGNOSIS — Z94.1 HEART TRANSPLANT STATUS: Chronic | ICD-10-CM

## 2024-06-18 LAB
ALBUMIN SERPL ELPH-MCNC: 4.5 G/DL
ALP BLD-CCNC: 65 U/L
ALT SERPL-CCNC: 18 U/L
ANION GAP SERPL CALC-SCNC: 12 MMOL/L
AST SERPL-CCNC: 25 U/L
BASOPHILS # BLD AUTO: 0.04 K/UL
BASOPHILS NFR BLD AUTO: 0.7 %
BILIRUB SERPL-MCNC: 0.3 MG/DL
BUN SERPL-MCNC: 21 MG/DL
CALCIUM SERPL-MCNC: 9.7 MG/DL
CHLORIDE SERPL-SCNC: 100 MMOL/L
CHOLEST SERPL-MCNC: 160 MG/DL
CO2 SERPL-SCNC: 27 MMOL/L
CREAT SERPL-MCNC: 1.61 MG/DL
EGFR: 49 ML/MIN/1.73M2
EOSINOPHIL # BLD AUTO: 0.21 K/UL
EOSINOPHIL NFR BLD AUTO: 3.6 %
GLUCOSE SERPL-MCNC: 131 MG/DL
HCT VFR BLD CALC: 39.7 %
HDLC SERPL-MCNC: 41 MG/DL
HGB BLD-MCNC: 13.1 G/DL
IMM GRANULOCYTES NFR BLD AUTO: 2.1 %
LDLC SERPL CALC-MCNC: 93 MG/DL
LYMPHOCYTES # BLD AUTO: 1.44 K/UL
LYMPHOCYTES NFR BLD AUTO: 24.8 %
MAGNESIUM SERPL-MCNC: 2 MG/DL
MAN DIFF?: NORMAL
MCHC RBC-ENTMCNC: 29.2 PG
MCHC RBC-ENTMCNC: 33 GM/DL
MCV RBC AUTO: 88.6 FL
MONOCYTES # BLD AUTO: 0.36 K/UL
MONOCYTES NFR BLD AUTO: 6.2 %
NEUTROPHILS # BLD AUTO: 3.64 K/UL
NEUTROPHILS NFR BLD AUTO: 62.6 %
NONHDLC SERPL-MCNC: 120 MG/DL
PLATELET # BLD AUTO: 239 K/UL
POTASSIUM SERPL-SCNC: 3.9 MMOL/L
PROT SERPL-MCNC: 6.8 G/DL
RBC # BLD: 4.48 M/UL
RBC # FLD: 13.2 %
SODIUM SERPL-SCNC: 139 MMOL/L
TACROLIMUS SERPL-MCNC: 7.7 NG/ML
TRIGL SERPL-MCNC: 150 MG/DL
WBC # FLD AUTO: 5.81 K/UL

## 2024-06-18 PROCEDURE — 93306 TTE W/DOPPLER COMPLETE: CPT

## 2024-06-18 PROCEDURE — 93308 TTE F-UP OR LMTD: CPT | Mod: 26

## 2024-06-18 PROCEDURE — 99214 OFFICE O/P EST MOD 30 MIN: CPT

## 2024-06-18 RX ORDER — MYCOPHENOLATE MOFETIL 250 MG/1
250 CAPSULE ORAL TWICE DAILY
Qty: 240 | Refills: 5 | Status: ACTIVE | COMMUNITY
Start: 2024-01-04 | End: 1900-01-01

## 2024-06-18 RX ORDER — PANTOPRAZOLE 40 MG/1
40 TABLET, DELAYED RELEASE ORAL DAILY
Qty: 30 | Refills: 5 | Status: ACTIVE | COMMUNITY
Start: 2024-01-04 | End: 1900-01-01

## 2024-06-18 RX ORDER — BUDESONIDE AND FORMOTEROL FUMARATE DIHYDRATE 160; 4.5 UG/1; UG/1
160-4.5 AEROSOL RESPIRATORY (INHALATION) TWICE DAILY
Qty: 1 | Refills: 5 | Status: ACTIVE | COMMUNITY
Start: 2024-01-04 | End: 1900-01-01

## 2024-06-18 RX ORDER — ASPIRIN ENTERIC COATED TABLETS 81 MG 81 MG/1
81 TABLET, DELAYED RELEASE ORAL
Qty: 30 | Refills: 5 | Status: ACTIVE | COMMUNITY
Start: 2024-01-04 | End: 1900-01-01

## 2024-06-18 RX ORDER — TACROLIMUS 1 MG/1
1 CAPSULE ORAL TWICE DAILY
Qty: 180 | Refills: 5 | Status: ACTIVE | COMMUNITY
Start: 2024-03-20 | End: 1900-01-01

## 2024-06-18 RX ORDER — PEN NEEDLE, DIABETIC 32 GX 1/4"
32G X 6 MM NEEDLE, DISPOSABLE MISCELLANEOUS
Qty: 1 | Refills: 5 | Status: ACTIVE | COMMUNITY
Start: 2024-01-04 | End: 1900-01-01

## 2024-06-18 RX ORDER — INSULIN ASPART 100 [IU]/ML
100 INJECTION, SOLUTION INTRAVENOUS; SUBCUTANEOUS
Qty: 1 | Refills: 5 | Status: ACTIVE | COMMUNITY
Start: 2024-01-04 | End: 1900-01-01

## 2024-06-18 RX ORDER — ASCORBIC ACID, THIAMINE, RIBOFLAVIN, NIACINAMIDE, PYRIDOXINE, FOLIC ACID, COBALAMIN, BIOTIN, PANTOTHENIC ACID 100; 1.5; 1.7; 20; 10; 1; 6; 300; 1 MG/1; MG/1; MG/1; MG/1; MG/1; MG/1; UG/1; UG/1; MG/1
TABLET, COATED ORAL
Qty: 30 | Refills: 5 | Status: ACTIVE | COMMUNITY
Start: 2024-01-04 | End: 1900-01-01

## 2024-06-18 RX ORDER — BLOOD-GLUCOSE SENSOR
EACH MISCELLANEOUS
Qty: 6 | Refills: 3 | Status: ACTIVE | COMMUNITY
Start: 2024-01-23 | End: 1900-01-01

## 2024-06-18 RX ORDER — ROSUVASTATIN CALCIUM 5 MG/1
5 TABLET, FILM COATED ORAL
Qty: 30 | Refills: 5 | Status: ACTIVE | COMMUNITY
Start: 2024-01-24 | End: 1900-01-01

## 2024-06-18 RX ORDER — MAGNESIUM OXIDE 241.3 MG/1000MG
400 TABLET ORAL 3 TIMES DAILY
Qty: 180 | Refills: 5 | Status: ACTIVE | COMMUNITY
Start: 2024-01-04 | End: 1900-01-01

## 2024-06-18 RX ORDER — VALGANCICLOVIR HYDROCHLORIDE 450 MG/1
450 TABLET ORAL
Qty: 60 | Refills: 5 | Status: DISCONTINUED | COMMUNITY
Start: 2024-01-04 | End: 2024-06-18

## 2024-06-18 RX ORDER — SULFAMETHOXAZOLE AND TRIMETHOPRIM 400; 80 MG/1; MG/1
400-80 TABLET ORAL
Qty: 30 | Refills: 5 | Status: ACTIVE | COMMUNITY
Start: 2024-01-04 | End: 1900-01-01

## 2024-06-18 RX ORDER — OLANZAPINE 5 MG/1
5 TABLET, FILM COATED ORAL
Qty: 30 | Refills: 5 | Status: ACTIVE | COMMUNITY
Start: 2024-01-04 | End: 1900-01-01

## 2024-06-18 RX ORDER — NIFEDIPINE 60 MG/1
60 TABLET, FILM COATED, EXTENDED RELEASE ORAL
Qty: 60 | Refills: 5 | Status: ACTIVE | COMMUNITY
Start: 2024-01-04 | End: 1900-01-01

## 2024-06-18 RX ORDER — LANCETS 33 GAUGE
EACH MISCELLANEOUS
Qty: 1 | Refills: 5 | Status: ACTIVE | COMMUNITY
Start: 2024-01-04 | End: 1900-01-01

## 2024-06-18 RX ORDER — BLOOD-GLUCOSE METER
KIT MISCELLANEOUS 4 TIMES DAILY
Qty: 1 | Refills: 5 | Status: ACTIVE | COMMUNITY
Start: 2024-01-04 | End: 1900-01-01

## 2024-06-18 RX ORDER — PREDNISONE 1 MG/1
1 TABLET ORAL
Qty: 90 | Refills: 5 | Status: ACTIVE | COMMUNITY
Start: 2024-01-04 | End: 1900-01-01

## 2024-06-18 RX ORDER — POTASSIUM CHLORIDE 1500 MG/1
20 TABLET, FILM COATED, EXTENDED RELEASE ORAL
Qty: 120 | Refills: 5 | Status: ACTIVE | COMMUNITY
Start: 2024-03-06 | End: 1900-01-01

## 2024-06-18 NOTE — HISTORY OF PRESENT ILLNESS
[FreeTextEntry1] : 59 y/o M with h/o HFrEF, CAD s/p PCI (2008), HTN, DMT2 (A1c 8.3%) and CVA s/p TPA (2018), who is now s/p OHT 12/25/23 (ischemic time: 253min, CMV -/+, Toxo -/-) and presents for routine follow-up/biopsy.   Pt initially presented to [facility] via EMS after syncope reportedly requiring defibrillation, left AMA to come to [facility] on 11/1/23 where he was found to have Covid and intubated for respiratory failure. Then underwent L/RHC on 11/1 revealing  of LAD and RCA, elevated filling pressures and CI of 1.5 which prompted placement of IABP which was subsequently removed c/b PMVT cardiac arrest requiring replacement of IABP on 11/9. During hospitalization, treated for Staph epi, Enterococcus faecalis, Cutibacterium with IV abx.  Pt was evaluated for heart transplant, listed Status 2, ABO A, PRA 0% and on 12/25 a suitable donor was identified and he underwent OHT. His first RHC/EMBx revealed elevated filling pressures and diuretics were adjusted. His course was complicated by leukocytosis, however infectious workup was negative and CT scan revealed significant constipation requiring disimpaction with improvement.  Pt was persistently hyperglycemic, endocrine adjusted insulin regimen. He underwent his second biopsy which revealed normal filling pressures and ISHLT Grade 0R, and was discharged home 1/9/23.  Since post transplant, he has a moderate pericardial effusion that has been unchanged on serial TTE's.   Pt arrives today for routine f/u outpatient visit 6 months post heart transplant. He reports feeling well. He continues to work with a  TIW for one hour; core, leg and arm exercises and walks unlimited.  He was seen at [facility] for pre travel vaccines. He did not receive any vaccines or prophylaxis medications. Pt planning to travel to [place] for 3 weeks. He was seen by Dr Reyna and repeat Chest CT was done 3/25/24 and was recommended to repeat again in May but pt did not return phone calls to schedule.  Reports 110-120's/80's at home, FS average 180 mg/dL, taking insulin as prescribed and is being followed by endocrine.  Sleeping well.  Currently denied SOB, CP, palpitations, N/V/D, fever, chills or LE edema. Pt reports 100% compliance with medications. Pt is working as a . Weight on home scale 187-188lbs.

## 2024-06-18 NOTE — PHYSICAL EXAM
[No Edema] : no edema [Normal] : no rash, no skin lesions [No Rash] : no rash [Moves all extremities] : moves all extremities [Alert and Oriented] : alert and oriented [de-identified] : +midsternal chest healed

## 2024-06-18 NOTE — CARDIOLOGY SUMMARY
[de-identified] : 3/19/24: EKG ST w/ PVC's 109bpm 3/5/24: EKG  bpm 2/6/24: ST 102bpm, MAGDA, right axis (unchanged compared to previous EKG 1/16/24) 1/16/24: ST 103bpm MAGDA, right axis [de-identified] : 6/18/24: TTE pending  3/19/24: CONCLUSIONS:  1. Normal right ventricular cavity size and normal systolic function.  2. Limited echocardiogram performed in the setting of RV biopsy.     No change s/p RV biopsy.  3. Compared to the transthoracic echocardiogram performed on 3/5/2024,.  4. Left ventricular endocardium is not well visualized; however, the left ventricular systolic function appears grossly normal. ________________________________________________________________________________________ FINDINGS: Left ventricular endocardium is not well visualized; however, the left ventricular systolic function appears grossly normal. The right ventricular cavity is normal in size and normal systolic function.There is mild tricuspid regurgitation. Pericardium:Fibrinous material adjacent to the visceral pericardium is seen, suggestive of an inflammatory process. There is a moderate to large pericardial effusion  3.5.24: TTE: CONCLUSIONS:    1. Left ventricular cavity is small. Left ventricular wall thickness is normal. Left ventricular systolic function is mildly decreased with an ejection fraction of 48 % by Chinchilla's method of disks. There are no regional wall motion abnormalities seen.  2. Normal left ventricular diastolic function, with normal filling pressure.  3. Normal right ventricular cavity size, with wall thickness, and reduced systolic function.  4. Compared to the transthoracic echocardiogram performed on 2/20/2024, the pericardial effusion appears similar.  5. Large pericardial effusion noted adjacent to the posterolateral left ventricle, moderate pericardial effusion noted adjacent to the anterior right ventricle and small pericardial effusion noted adjacent to the right atrium with no evidence of hemodynamic compromise (or echocardiographic evidence of cardiac tamponade): greater than 30% respiratory variation across the mitral valve E wave, no diastolic collapse of right atrium, exceding 1/3 of the cardiac cycle, no early diastolic inversion of the right ventricle and respiratory variation across the tricuspid valve E wave is not greater than 60%.  6. There is normal LV mass and concentric remodeling.   2/14: CONCLUSIONS:  1. Left ventricular systolic function is normal with an ejection fraction visually estimated at 60 to 65 %.  2. Normal right ventricular cavity size and reduced systolic function.  3. Consider effusive constrictive pericaridits. 2/4 criterial annulus reversus and >30% trans jhonny flow variation. however IVC not dilated, no septal bounce.  4. Compared to the transthoracic echocardiogram performed on 2/6/2024, there have been no significant changes.  5. Moderate pericardial effusion.  6. The inferior vena cava is normal in size measuring 1.60 cm in diameter, (normal <2.1cm) with abnormal inspiratory collapse (abnormal <50%) consistent with mildly elevated right atrial pressure (~8, range 5-10mmHg).  7. Thickened pericardium. 2/6: TTE: CONCLUSIONS:  1. Limited TTE Pre and Post RV biopsy. No significant changes noted.  2. Left ventricular systolic function is normal with an ejection fraction visually estimated at 55 to 60 %.  3. Normal right ventricular cavity size and reduced systolic function.  4. Trace tricuspid regurgitation.  5. Moderate pericardial effusion with no evidence of hemodynamic compromise (or echocardiographic evidence of cardiac tamponade).  6. Compared to the transthoracic echocardiogram performed on 1/23/2024, The effusion appears larger.. Findings were discussed with Dr. Francesca Richardson on 2/6/2024 at bedside during TTE.  1/9: normal biventricular function 1/2: normal biventricular function [de-identified] : 3/19/24: RHC/EMBx ISHLT Grade 1R/1A 2/20/24: RHC/EMBX ISHLT Grade 0R 2/6/24: RHC/EMBx RA 6 PAP 32/18/23 PCW 22 Pasat 70% Juan CO/CI 5.7/2.9, EMBx ISHLT Grade 1R/1A, CD4 negative 1/23/24: RHC/EMBx ISHLT Grade 1R/1A 1/16/24: RHC/EMBx RA 4 PAP 26/10/18 PCW 13 PaSat 63% Juan CO/CI 6/3.1, EMBx ISHLT grade 0; AMR 0 1/9/24: RHC/EMBx: RA 5, RV 19/6/8, PA 21/13/17, PCWP 15, PA sat 68.9, EMBx ISHLT Grade 0R 1/2/24: RHC/EMBx: RA 4 PAP 20/12/15 PCW 15 PaSat 66% Juan CO/CI EMBx ISHLT Grade 0R [de-identified] : CT chest 3/25/24: IMPRESSION:No acute pleural or parenchymal abnormality. Is stable appearance of a moderate pericardial effusion  CT chest abd/pelvis 1/5/24: IMPRESSION: Question mild stercoral colitis. Expected postoperative changes in the chest. No fluid collection.  DSAs: 1/9: 0%  1/2: 0%  Heartcare: not collected yet

## 2024-06-19 PROBLEM — A09 TRAVELERS' DIARRHEA: Status: ACTIVE | Noted: 2024-06-19

## 2024-06-19 LAB
CMV DNA SPEC QL NAA+PROBE: NOT DETECTED IU/ML
CMVPCR LOG: NOT DETECTED LOG10IU/ML

## 2024-06-19 RX ORDER — AZITHROMYCIN 500 MG/1
500 TABLET, FILM COATED ORAL DAILY
Qty: 1 | Refills: 0 | Status: ACTIVE | COMMUNITY
Start: 2024-06-19 | End: 1900-01-01

## 2024-06-21 LAB
ALBUMIN SERPL ELPH-MCNC: 4.4 G/DL
ALP BLD-CCNC: 64 U/L
ALT SERPL-CCNC: 18 U/L
ANION GAP SERPL CALC-SCNC: 17 MMOL/L
AST SERPL-CCNC: 24 U/L
BILIRUB SERPL-MCNC: 0.3 MG/DL
BUN SERPL-MCNC: 27 MG/DL
CALCIUM SERPL-MCNC: 10.1 MG/DL
CHLORIDE SERPL-SCNC: 99 MMOL/L
CO2 SERPL-SCNC: 24 MMOL/L
CREAT SERPL-MCNC: 1.28 MG/DL
EGFR: 64 ML/MIN/1.73M2
GLUCOSE SERPL-MCNC: 170 MG/DL
POTASSIUM SERPL-SCNC: 4.3 MMOL/L
PROT SERPL-MCNC: 6.6 G/DL
SODIUM SERPL-SCNC: 139 MMOL/L
TACROLIMUS SERPL-MCNC: 9.7 NG/ML

## 2024-07-24 NOTE — CONSULT LETTER
[Dear  ___] : Dear  [unfilled], [Consult Letter:] : I had the pleasure of evaluating your patient, [unfilled]. [Please see my note below.] : Please see my note below. [Consult Closing:] : Thank you very much for allowing me to participate in the care of this patient.  If you have any questions, please do not hesitate to contact me. [Sincerely,] : Sincerely, [FreeTextEntry2] : Dr. Barboza  [FreeTextEntry3] : Pratibha Reyna MD Attending Surgeon Division of Thoracic Surgery , [facility] [employer] at [facility]

## 2024-07-24 NOTE — ASSESSMENT
[FreeTextEntry1] : Mr. DEVORAH VALENCIA, 58 year old male, never smoker, w/ hx of HLD, DMII, CAD, CVA in 2018, CHF, s/p heart transplant in Dec 2023, who presented with pericardial effusion since the transplant.    I have reviewed the patient's medical records and diagnostic images at time of this office consultation and have made the following recommendation: 1.

## 2024-07-24 NOTE — HISTORY OF PRESENT ILLNESS
[FreeTextEntry1] : Mr. DEVORAH VALENCIA, 58 year old male, never smoker, w/ hx of HLD, DMII, CAD, CVA in 2018, CHF, s/p heart transplant in Dec 2023, who presented with pericardial effusion since the transplant.   CT Chest on 3/25/24: - there is moderate pericardial effusion measuring about 20 Hounsfield units on CT attenuation, with no significant change compared to prior study - status post median sternotomy - there are old healed left rib fractures  CT Chest on  .....  PHQ2 completed on???  Pt presents today for follow up.

## 2024-08-01 ENCOUNTER — NON-APPOINTMENT (OUTPATIENT)
Age: 59
End: 2024-08-01

## 2024-08-02 DIAGNOSIS — Z94.1 HEART TRANSPLANT STATUS: ICD-10-CM

## 2024-08-05 ENCOUNTER — NON-APPOINTMENT (OUTPATIENT)
Age: 59
End: 2024-08-05

## 2024-08-05 ENCOUNTER — APPOINTMENT (OUTPATIENT)
Dept: THORACIC SURGERY | Facility: CLINIC | Age: 59
End: 2024-08-05

## 2024-08-05 LAB
ALBUMIN SERPL ELPH-MCNC: 4.7 G/DL
ALP BLD-CCNC: 77 U/L
ALT SERPL-CCNC: 17 U/L
ANION GAP SERPL CALC-SCNC: 13 MMOL/L
AST SERPL-CCNC: 19 U/L
BASOPHILS # BLD AUTO: 0.04 K/UL
BASOPHILS NFR BLD AUTO: 0.5 %
BILIRUB SERPL-MCNC: 0.3 MG/DL
BUN SERPL-MCNC: 25 MG/DL
CALCIUM SERPL-MCNC: 10.4 MG/DL
CHLORIDE SERPL-SCNC: 99 MMOL/L
CO2 SERPL-SCNC: 29 MMOL/L
CREAT SERPL-MCNC: 1.33 MG/DL
EGFR: 62 ML/MIN/1.73M2
EOSINOPHIL # BLD AUTO: 0.37 K/UL
EOSINOPHIL NFR BLD AUTO: 4.5 %
GLUCOSE SERPL-MCNC: 155 MG/DL
HCT VFR BLD CALC: 41.9 %
HGB BLD-MCNC: 13.1 G/DL
IMM GRANULOCYTES NFR BLD AUTO: 0.2 %
LYMPHOCYTES # BLD AUTO: 1.91 K/UL
LYMPHOCYTES NFR BLD AUTO: 23.3 %
MAGNESIUM SERPL-MCNC: 2.1 MG/DL
MAN DIFF?: NORMAL
MCHC RBC-ENTMCNC: 27.3 PG
MCHC RBC-ENTMCNC: 31.3 GM/DL
MCV RBC AUTO: 87.3 FL
MONOCYTES # BLD AUTO: 0.59 K/UL
MONOCYTES NFR BLD AUTO: 7.2 %
NEUTROPHILS # BLD AUTO: 5.27 K/UL
NEUTROPHILS NFR BLD AUTO: 64.3 %
PLATELET # BLD AUTO: 273 K/UL
POTASSIUM SERPL-SCNC: 4.2 MMOL/L
PROT SERPL-MCNC: 7.1 G/DL
RBC # BLD: 4.8 M/UL
RBC # FLD: 13.3 %
SODIUM SERPL-SCNC: 141 MMOL/L
TACROLIMUS SERPL-MCNC: 6.1 NG/ML
WBC # FLD AUTO: 8.2 K/UL

## 2024-08-05 PROCEDURE — 99213 OFFICE O/P EST LOW 20 MIN: CPT

## 2024-08-05 NOTE — ASSESSMENT
[FreeTextEntry1] : Mr. DEVORAH VALENCIA, 58 year old male, never smoker, w/ hx of HLD, DMII, CAD, CVA in 2018, CHF, s/p heart transplant in Dec 2023, who presented with pericardial effusion since the transplant.   TTE on 6/18/24:  - the pericardial effusion has decreased in size  I have reviewed the patient's medical records and diagnostic images at time of this office consultation and have made the following recommendation: 1. TTE showed decreased in effusion, RTC TTM next echo.    I, Dr. RIVAS[facility], personally performed the evaluation and management (E/M) services for this established patient who presents today with (a) new problem(s)/exacerbation of (an) existing condition(s).  That E/M includes conducting the examination, assessing all new/exacerbated conditions, and establishing a new plan of care.  Today, my ACP, Shruthi Brooks, JOANNE was here to observe my evaluation and management services for this new problem/exacerbated condition to be followed going forward.

## 2024-08-05 NOTE — ASSESSMENT
[FreeTextEntry1] : Mr. DEVORAH VALENCIA, 58 year old male, never smoker, w/ hx of HLD, DMII, CAD, CVA in 2018, CHF, s/p heart transplant in Dec 2023, who presented with pericardial effusion since the transplant.   TTE on 6/18/24:  - the pericardial effusion has decreased in size  I have reviewed the patient's medical records and diagnostic images at time of this office consultation and have made the following recommendation: 1. TTE showed decreased in effusion, RTC TTM next echo.    I, Dr. [facility]IVAS[facility], personally performed the evaluation and management (E/M) services for this established patient who presents today with (a) new problem(s)/exacerbation of (an) existing condition(s).  That E/M includes conducting the examination, assessing all new/exacerbated conditions, and establishing a new plan of care.  Today, my ACP, Shruthi Brooks, JOANNE was here to observe my evaluation and management services for this new problem/exacerbated condition to be followed going forward.

## 2024-08-05 NOTE — HISTORY OF PRESENT ILLNESS
[FreeTextEntry1] : Mr. DEVORAH VALENCIA, 58 year old male, never smoker, w/ hx of HLD, DMII, CAD, CVA in 2018, CHF, s/p heart transplant in Dec 2023, who presented with pericardial effusion since the transplant.   CT Chest on 3/25/24: - there is moderate pericardial effusion measuring about 20 Hounsfield units on CT attenuation, with no significant change compared to prior study - status post median sternotomy - there are old healed left rib fractures  TTE on 6/18/24:  - the pericardial effusion has decreased in size  PHQ2 completed on 8/5/24.   Pt presents today for follow up. Pt reports doing well, denies SOB, cough or CP.

## 2024-08-05 NOTE — CONSULT LETTER
[FreeTextEntry2] : Dr. Barboza  [FreeTextEntry3] : Pratibha Reyna MD Attending Surgeon Division of Thoracic Surgery , [facility] [employer] at [facility]

## 2024-08-05 NOTE — CONSULT LETTER
[FreeTextEntry2] : Dr. Barboza  [FreeTextEntry3] : Pratibha Reyna MD Attending Surgeon Division of Thoracic Surgery , [facility] [employer] at [facility]

## 2024-08-06 ENCOUNTER — NON-APPOINTMENT (OUTPATIENT)
Age: 59
End: 2024-08-06

## 2024-08-07 ENCOUNTER — APPOINTMENT (OUTPATIENT)
Dept: ENDOCRINOLOGY | Facility: CLINIC | Age: 59
End: 2024-08-07

## 2024-08-13 ENCOUNTER — RESULT REVIEW (OUTPATIENT)
Age: 59
End: 2024-08-13

## 2024-08-13 ENCOUNTER — APPOINTMENT (OUTPATIENT)
Dept: CV DIAGNOSITCS | Facility: HOSPITAL | Age: 59
End: 2024-08-13

## 2024-08-13 ENCOUNTER — APPOINTMENT (OUTPATIENT)
Dept: HEART FAILURE | Facility: CLINIC | Age: 59
End: 2024-08-13

## 2024-08-13 VITALS
BODY MASS INDEX: 30.36 KG/M2 | WEIGHT: 205 LBS | SYSTOLIC BLOOD PRESSURE: 137 MMHG | HEIGHT: 69 IN | OXYGEN SATURATION: 97 % | TEMPERATURE: 98.6 F | HEART RATE: 116 BPM | DIASTOLIC BLOOD PRESSURE: 89 MMHG

## 2024-08-13 DIAGNOSIS — Z91.199 PATIENT'S NONCOMPLIANCE WITH OTHER MEDICAL TREATMENT AND REGIMEN DUE TO UNSPECIFIED REASON: ICD-10-CM

## 2024-08-13 DIAGNOSIS — D84.9 IMMUNODEFICIENCY, UNSPECIFIED: ICD-10-CM

## 2024-08-13 PROCEDURE — 99214 OFFICE O/P EST MOD 30 MIN: CPT

## 2024-08-13 NOTE — CARDIOLOGY SUMMARY
[de-identified] : 8/13/24: 3/19/24: EKG ST w/ PVC's 109bpm 3/5/24: EKG  bpm 2/6/24: ST 102bpm, MAGDA, right axis (unchanged compared to previous EKG 1/16/24) 1/16/24: ST 103bpm MAGDA, right axis [de-identified] : 6/18/24: LVEF low normal 51%, no RWMA. Basal and mid anterior septum and mid inferoseptal segment are abnormal/hypokinetic. Normal RVSF. estimated PASP 22mmHg. Compared to prior echo, mild improvement in LVEF, and pericardial effusion is small (decreased from prior) adjacent to the anterior right ventricle with no evidence of hemodynamic compromise   3/19/24: CONCLUSIONS:  1. Normal right ventricular cavity size and normal systolic function.  2. Limited echocardiogram performed in the setting of RV biopsy.     No change s/p RV biopsy.  3. Compared to the transthoracic echocardiogram performed on 3/5/2024,.  4. Left ventricular endocardium is not well visualized; however, the left ventricular systolic function appears grossly normal. ________________________________________________________________________________________ FINDINGS: Left ventricular endocardium is not well visualized; however, the left ventricular systolic function appears grossly normal. The right ventricular cavity is normal in size and normal systolic function.There is mild tricuspid regurgitation. Pericardium:Fibrinous material adjacent to the visceral pericardium is seen, suggestive of an inflammatory process. There is a moderate to large pericardial effusion  3.5.24: TTE: CONCLUSIONS:    1. Left ventricular cavity is small. Left ventricular wall thickness is normal. Left ventricular systolic function is mildly decreased with an ejection fraction of 48 % by Chinchilla's method of disks. There are no regional wall motion abnormalities seen.  2. Normal left ventricular diastolic function, with normal filling pressure.  3. Normal right ventricular cavity size, with wall thickness, and reduced systolic function.  4. Compared to the transthoracic echocardiogram performed on 2/20/2024, the pericardial effusion appears similar.  5. Large pericardial effusion noted adjacent to the posterolateral left ventricle, moderate pericardial effusion noted adjacent to the anterior right ventricle and small pericardial effusion noted adjacent to the right atrium with no evidence of hemodynamic compromise (or echocardiographic evidence of cardiac tamponade): greater than 30% respiratory variation across the mitral valve E wave, no diastolic collapse of right atrium, exceding 1/3 of the cardiac cycle, no early diastolic inversion of the right ventricle and respiratory variation across the tricuspid valve E wave is not greater than 60%.  6. There is normal LV mass and concentric remodeling.   2/14: CONCLUSIONS:  1. Left ventricular systolic function is normal with an ejection fraction visually estimated at 60 to 65 %.  2. Normal right ventricular cavity size and reduced systolic function.  3. Consider effusive constrictive pericaridits. 2/4 criterial annulus reversus and >30% trans jhonny flow variation. however IVC not dilated, no septal bounce.  4. Compared to the transthoracic echocardiogram performed on 2/6/2024, there have been no significant changes.  5. Moderate pericardial effusion.  6. The inferior vena cava is normal in size measuring 1.60 cm in diameter, (normal <2.1cm) with abnormal inspiratory collapse (abnormal <50%) consistent with mildly elevated right atrial pressure (~8, range 5-10mmHg).  7. Thickened pericardium. 2/6: TTE: CONCLUSIONS:  1. Limited TTE Pre and Post RV biopsy. No significant changes noted.  2. Left ventricular systolic function is normal with an ejection fraction visually estimated at 55 to 60 %.  3. Normal right ventricular cavity size and reduced systolic function.  4. Trace tricuspid regurgitation.  5. Moderate pericardial effusion with no evidence of hemodynamic compromise (or echocardiographic evidence of cardiac tamponade).  6. Compared to the transthoracic echocardiogram performed on 1/23/2024, The effusion appears larger.. Findings were discussed with Dr. Francesca Richardson on 2/6/2024 at bedside during TTE.  1/9: normal biventricular function 1/2: normal biventricular function [de-identified] : 3/19/24: RHC/EMBx ISHLT Grade 1R/1A 2/20/24: RHC/EMBX ISHLT Grade 0R 2/6/24: RHC/EMBx RA 6 PAP 32/18/23 PCW 22 Pasat 70% Juan CO/CI 5.7/2.9, EMBx ISHLT Grade 1R/1A, CD4 negative 1/23/24: RHC/EMBx ISHLT Grade 1R/1A 1/16/24: RHC/EMBx RA 4 PAP 26/10/18 PCW 13 PaSat 63% Juan CO/CI 6/3.1, EMBx ISHLT grade 0; AMR 0 1/9/24: RHC/EMBx: RA 5, RV 19/6/8, PA 21/13/17, PCWP 15, PA sat 68.9, EMBx ISHLT Grade 0R 1/2/24: RHC/EMBx: RA 4 PAP 20/12/15 PCW 15 PaSat 66% Juan CO/CI EMBx ISHLT Grade 0R [de-identified] : CT chest 3/25/24: IMPRESSION:No acute pleural or parenchymal abnormality. Is stable appearance of a moderate pericardial effusion  CT chest abd/pelvis 1/5/24: IMPRESSION: Question mild stercoral colitis. Expected postoperative changes in the chest. No fluid collection.  DSAs: 1/9: 0%  1/2: 0%  Heartcare: not collected yet

## 2024-08-13 NOTE — HISTORY OF PRESENT ILLNESS
[FreeTextEntry1] : 57 y/o M with h/o HFrEF, CAD s/p PCI (2008), HTN, DMT2 (A1c 8.3%) and CVA s/p TPA (2018), who is now s/p OHT 12/25/23 (ischemic time: 253min, CMV -/+, Toxo -/-) and presents for routine follow-up/biopsy.   Pt initially presented to [facility] via EMS after syncope reportedly requiring defibrillation, left AMA to come to [facility] on 11/1/23 where he was found to have Covid and intubated for respiratory failure. Then underwent L/RHC on 11/1 revealing  of LAD and RCA, elevated filling pressures and CI of 1.5 which prompted placement of IABP which was subsequently removed c/b PMVT cardiac arrest requiring replacement of IABP on 11/9. During hospitalization, treated for Staph epi, Enterococcus faecalis, Cutibacterium with IV abx.  Pt was evaluated for heart transplant, listed Status 2, ABO A, PRA 0% and on 12/25 a suitable donor was identified and he underwent OHT. His first RHC/EMBx revealed elevated filling pressures and diuretics were adjusted. His course was complicated by leukocytosis, however infectious workup was negative and CT scan revealed significant constipation requiring disimpaction with improvement.  Pt was persistently hyperglycemic, endocrine adjusted insulin regimen. He underwent his second biopsy which revealed normal filling pressures and ISHLT Grade 0R, and was discharged home 1/9/23.  Since transplant, he had a moderate pericardial effusion followed with serial TTE and CT imaging. He was seen by Dr Reyna and repeat Chest CT was done 3/25/24 demonstrating stable appearance of a moderate pericardial effusion. He was recommended to repeat again in May but pt did not return phone calls to schedule. Most recently 6/2024 effusion appeared small and reduced in size from prior imaging.  Last month in July 2024 he traveled to [place] for 3 weeks. He did not receive any vaccines or prophylaxis medications. Upon his return he did blood work 7/30 which was notable for subtherapeutic prograf level of 3.7 (goal 8-10). DSA 7/31 with new Class I DSA, low level MFI 2K. Subsequently allosure was sent 8/5, with rise from 0.12 to 0.18. Cellcept was increased from 1G BID to 1500mg BID. Despite several dose increases, prograf has still not reached therapeutic range.   Pt arrives today for outpatient follow-up visit 8 months post heart transplant. He is seen with his daughter present. Given persistently subtherapeutic tacro levels, slight rise in allosure and DSA's, he is here for a clinic visit, pill box check and TTE. He reports feeling well overall since returning from [place] ~2 weeks ago. He walks unlimited distances and denies SOB, chest discomfort, dizziness/lightheadedness or palpitations. He occasionally has LE edema, which improves with rest and leg elevation. He had one episode of diarrhea since last seen, otherwise normal BMs. He reports he is normotensive and continues to monitor BG with mango sensor. He has a follow-up endocrine appointment tomorrow. He does report weight gain over the past month or so; he ate high protein meals while away. Weight is ~200lb (from 187-188lbs when seen in June). He plans to exercise more and modify diet. He denies fever/chills. Pt reports 100% compliance with medications- he does the pill box himself. Pt is working as a .   Discussed diet modifications and the importance of staying consistent with tacrolimus ingestion on an empty stomach.

## 2024-08-13 NOTE — PHYSICAL EXAM
[No Edema] : no edema [Normal] : no rash, no skin lesions [No Rash] : no rash [Moves all extremities] : moves all extremities [Alert and Oriented] : alert and oriented [de-identified] : +midsternal chest healed

## 2024-08-14 ENCOUNTER — APPOINTMENT (OUTPATIENT)
Dept: ENDOCRINOLOGY | Facility: CLINIC | Age: 59
End: 2024-08-14
Payer: COMMERCIAL

## 2024-08-14 VITALS
BODY MASS INDEX: 29.18 KG/M2 | HEIGHT: 69 IN | WEIGHT: 197 LBS | HEART RATE: 117 BPM | DIASTOLIC BLOOD PRESSURE: 80 MMHG | SYSTOLIC BLOOD PRESSURE: 126 MMHG | OXYGEN SATURATION: 98 %

## 2024-08-14 DIAGNOSIS — Z94.1 HEART TRANSPLANT STATUS: ICD-10-CM

## 2024-08-14 DIAGNOSIS — E11.65 TYPE 2 DIABETES MELLITUS WITH HYPERGLYCEMIA: ICD-10-CM

## 2024-08-14 DIAGNOSIS — E78.5 HYPERLIPIDEMIA, UNSPECIFIED: ICD-10-CM

## 2024-08-14 PROCEDURE — 95251 CONT GLUC MNTR ANALYSIS I&R: CPT

## 2024-08-14 PROCEDURE — 99214 OFFICE O/P EST MOD 30 MIN: CPT

## 2024-08-14 RX ORDER — BLOOD-GLUCOSE METER
W/DEVICE KIT MISCELLANEOUS
Qty: 1 | Refills: 0 | Status: ACTIVE | COMMUNITY
Start: 2024-08-14 | End: 1900-01-01

## 2024-08-16 LAB
ALBUMIN SERPL ELPH-MCNC: 4.7 G/DL
ALP BLD-CCNC: 88 U/L
ALT SERPL-CCNC: 19 U/L
ANION GAP SERPL CALC-SCNC: 16 MMOL/L
AST SERPL-CCNC: 17 U/L
BASOPHILS # BLD AUTO: 0.03 K/UL
BASOPHILS NFR BLD AUTO: 0.3 %
BILIRUB SERPL-MCNC: 0.3 MG/DL
BUN SERPL-MCNC: 25 MG/DL
CALCIUM SERPL-MCNC: 10 MG/DL
CHLORIDE SERPL-SCNC: 102 MMOL/L
CO2 SERPL-SCNC: 26 MMOL/L
CREAT SERPL-MCNC: 1.42 MG/DL
EGFR: 57 ML/MIN/1.73M2
EOSINOPHIL # BLD AUTO: 0.2 K/UL
EOSINOPHIL NFR BLD AUTO: 2.2 %
GLUCOSE SERPL-MCNC: 126 MG/DL
HCT VFR BLD CALC: 42.3 %
HGB BLD-MCNC: 13.5 G/DL
IMM GRANULOCYTES NFR BLD AUTO: 0.3 %
LYMPHOCYTES # BLD AUTO: 2.21 K/UL
LYMPHOCYTES NFR BLD AUTO: 24.2 %
MAN DIFF?: NORMAL
MCHC RBC-ENTMCNC: 27.2 PG
MCHC RBC-ENTMCNC: 31.9 GM/DL
MCV RBC AUTO: 85.1 FL
MONOCYTES # BLD AUTO: 0.71 K/UL
MONOCYTES NFR BLD AUTO: 7.8 %
NEUTROPHILS # BLD AUTO: 5.96 K/UL
NEUTROPHILS NFR BLD AUTO: 65.2 %
PLATELET # BLD AUTO: 250 K/UL
POTASSIUM SERPL-SCNC: 4.5 MMOL/L
PROT SERPL-MCNC: 6.8 G/DL
RBC # BLD: 4.97 M/UL
RBC # FLD: 13.3 %
SODIUM SERPL-SCNC: 144 MMOL/L
TACROLIMUS SERPL-MCNC: 9 NG/ML
WBC # FLD AUTO: 9.14 K/UL

## 2024-08-19 LAB
BASOPHILS # BLD AUTO: 0.04 K/UL
BASOPHILS NFR BLD AUTO: 0.4 %
CMV DNA SPEC QL NAA+PROBE: NOT DETECTED IU/ML
CMVPCR LOG: NOT DETECTED LOG10IU/ML
EOSINOPHIL # BLD AUTO: 0.19 K/UL
EOSINOPHIL NFR BLD AUTO: 2 %
HCT VFR BLD CALC: 44.6 %
HGB BLD-MCNC: 14.2 G/DL
IMM GRANULOCYTES NFR BLD AUTO: 0.4 %
LYMPHOCYTES # BLD AUTO: 2.35 K/UL
LYMPHOCYTES NFR BLD AUTO: 24.7 %
MAN DIFF?: NORMAL
MCHC RBC-ENTMCNC: 27.1 PG
MCHC RBC-ENTMCNC: 31.8 GM/DL
MCV RBC AUTO: 85.1 FL
MONOCYTES # BLD AUTO: 0.69 K/UL
MONOCYTES NFR BLD AUTO: 7.2 %
NEUTROPHILS # BLD AUTO: 6.21 K/UL
NEUTROPHILS NFR BLD AUTO: 65.3 %
PLATELET # BLD AUTO: 266 K/UL
RBC # BLD: 5.24 M/UL
RBC # FLD: 13.3 %
WBC # FLD AUTO: 9.52 K/UL

## 2024-08-20 LAB
ALBUMIN SERPL ELPH-MCNC: 4.9 G/DL
ALP BLD-CCNC: 101 U/L
ALT SERPL-CCNC: 19 U/L
ANION GAP SERPL CALC-SCNC: 14 MMOL/L
AST SERPL-CCNC: 18 U/L
BILIRUB SERPL-MCNC: 0.3 MG/DL
BUN SERPL-MCNC: 23 MG/DL
CALCIUM SERPL-MCNC: 10.6 MG/DL
CHLORIDE SERPL-SCNC: 98 MMOL/L
CO2 SERPL-SCNC: 28 MMOL/L
CREAT SERPL-MCNC: 1.44 MG/DL
EGFR: 56 ML/MIN/1.73M2
GLUCOSE SERPL-MCNC: 177 MG/DL
MAGNESIUM SERPL-MCNC: 1.7 MG/DL
POTASSIUM SERPL-SCNC: 3.9 MMOL/L
PROT SERPL-MCNC: 7.3 G/DL
SODIUM SERPL-SCNC: 140 MMOL/L
TACROLIMUS SERPL-MCNC: 13 NG/ML

## 2024-08-27 ENCOUNTER — APPOINTMENT (OUTPATIENT)
Dept: CV DIAGNOSITCS | Facility: HOSPITAL | Age: 59
End: 2024-08-27

## 2024-08-27 ENCOUNTER — OUTPATIENT (OUTPATIENT)
Dept: OUTPATIENT SERVICES | Facility: HOSPITAL | Age: 59
LOS: 1 days | End: 2024-08-27
Payer: COMMERCIAL

## 2024-08-27 ENCOUNTER — APPOINTMENT (OUTPATIENT)
Dept: HEART FAILURE | Facility: CLINIC | Age: 59
End: 2024-08-27

## 2024-08-27 ENCOUNTER — RESULT REVIEW (OUTPATIENT)
Age: 59
End: 2024-08-27

## 2024-08-27 VITALS
SYSTOLIC BLOOD PRESSURE: 115 MMHG | BODY MASS INDEX: 30.21 KG/M2 | TEMPERATURE: 98.2 F | OXYGEN SATURATION: 96 % | DIASTOLIC BLOOD PRESSURE: 88 MMHG | HEART RATE: 118 BPM | HEIGHT: 69 IN | WEIGHT: 204 LBS

## 2024-08-27 DIAGNOSIS — Z94.1 HEART TRANSPLANT STATUS: ICD-10-CM

## 2024-08-27 DIAGNOSIS — Z94.1 HEART TRANSPLANT STATUS: Chronic | ICD-10-CM

## 2024-08-27 DIAGNOSIS — Z95.5 PRESENCE OF CORONARY ANGIOPLASTY IMPLANT AND GRAFT: Chronic | ICD-10-CM

## 2024-08-27 PROCEDURE — 93356 MYOCRD STRAIN IMG SPCKL TRCK: CPT

## 2024-08-27 PROCEDURE — 99214 OFFICE O/P EST MOD 30 MIN: CPT

## 2024-08-27 PROCEDURE — 93308 TTE F-UP OR LMTD: CPT | Mod: 26

## 2024-08-27 PROCEDURE — 93308 TTE F-UP OR LMTD: CPT

## 2024-08-27 NOTE — PHYSICAL EXAM
[No Edema] : no edema [Normal] : no rash, no skin lesions [No Rash] : no rash [Moves all extremities] : moves all extremities [Alert and Oriented] : alert and oriented [de-identified] : +midsternal chest healed

## 2024-08-27 NOTE — CARDIOLOGY SUMMARY
[de-identified] : 8/13/24: 3/19/24: EKG ST w/ PVC's 109bpm 3/5/24: EKG  bpm 2/6/24: ST 102bpm, MAGDA, right axis (unchanged compared to previous EKG 1/16/24) 1/16/24: ST 103bpm MAGDA, right axis [de-identified] : 6/18/24: LVEF low normal 51%, no RWMA. Basal and mid anterior septum and mid inferoseptal segment are abnormal/hypokinetic. Normal RVSF. estimated PASP 22mmHg. Compared to prior echo, mild improvement in LVEF, and pericardial effusion is small (decreased from prior) adjacent to the anterior right ventricle with no evidence of hemodynamic compromise   3/19/24: CONCLUSIONS:  1. Normal right ventricular cavity size and normal systolic function.  2. Limited echocardiogram performed in the setting of RV biopsy.     No change s/p RV biopsy.  3. Compared to the transthoracic echocardiogram performed on 3/5/2024,.  4. Left ventricular endocardium is not well visualized; however, the left ventricular systolic function appears grossly normal. ________________________________________________________________________________________ FINDINGS: Left ventricular endocardium is not well visualized; however, the left ventricular systolic function appears grossly normal. The right ventricular cavity is normal in size and normal systolic function.There is mild tricuspid regurgitation. Pericardium:Fibrinous material adjacent to the visceral pericardium is seen, suggestive of an inflammatory process. There is a moderate to large pericardial effusion  3.5.24: TTE: CONCLUSIONS:    1. Left ventricular cavity is small. Left ventricular wall thickness is normal. Left ventricular systolic function is mildly decreased with an ejection fraction of 48 % by Chinchilla's method of disks. There are no regional wall motion abnormalities seen.  2. Normal left ventricular diastolic function, with normal filling pressure.  3. Normal right ventricular cavity size, with wall thickness, and reduced systolic function.  4. Compared to the transthoracic echocardiogram performed on 2/20/2024, the pericardial effusion appears similar.  5. Large pericardial effusion noted adjacent to the posterolateral left ventricle, moderate pericardial effusion noted adjacent to the anterior right ventricle and small pericardial effusion noted adjacent to the right atrium with no evidence of hemodynamic compromise (or echocardiographic evidence of cardiac tamponade): greater than 30% respiratory variation across the mitral valve E wave, no diastolic collapse of right atrium, exceding 1/3 of the cardiac cycle, no early diastolic inversion of the right ventricle and respiratory variation across the tricuspid valve E wave is not greater than 60%.  6. There is normal LV mass and concentric remodeling.   2/14: CONCLUSIONS:  1. Left ventricular systolic function is normal with an ejection fraction visually estimated at 60 to 65 %.  2. Normal right ventricular cavity size and reduced systolic function.  3. Consider effusive constrictive pericaridits. 2/4 criterial annulus reversus and >30% trans jhonny flow variation. however IVC not dilated, no septal bounce.  4. Compared to the transthoracic echocardiogram performed on 2/6/2024, there have been no significant changes.  5. Moderate pericardial effusion.  6. The inferior vena cava is normal in size measuring 1.60 cm in diameter, (normal <2.1cm) with abnormal inspiratory collapse (abnormal <50%) consistent with mildly elevated right atrial pressure (~8, range 5-10mmHg).  7. Thickened pericardium. 2/6: TTE: CONCLUSIONS:  1. Limited TTE Pre and Post RV biopsy. No significant changes noted.  2. Left ventricular systolic function is normal with an ejection fraction visually estimated at 55 to 60 %.  3. Normal right ventricular cavity size and reduced systolic function.  4. Trace tricuspid regurgitation.  5. Moderate pericardial effusion with no evidence of hemodynamic compromise (or echocardiographic evidence of cardiac tamponade).  6. Compared to the transthoracic echocardiogram performed on 1/23/2024, The effusion appears larger.. Findings were discussed with Dr. Francesca Richarsdon on 2/6/2024 at bedside during TTE.  1/9: normal biventricular function 1/2: normal biventricular function [de-identified] : 3/19/24: RHC/EMBx ISHLT Grade 1R/1A 2/20/24: RHC/EMBX ISHLT Grade 0R 2/6/24: RHC/EMBx RA 6 PAP 32/18/23 PCW 22 Pasat 70% Juan CO/CI 5.7/2.9, EMBx ISHLT Grade 1R/1A, CD4 negative 1/23/24: RHC/EMBx ISHLT Grade 1R/1A 1/16/24: RHC/EMBx RA 4 PAP 26/10/18 PCW 13 PaSat 63% Juan CO/CI 6/3.1, EMBx ISHLT grade 0; AMR 0 1/9/24: RHC/EMBx: RA 5, RV 19/6/8, PA 21/13/17, PCWP 15, PA sat 68.9, EMBx ISHLT Grade 0R 1/2/24: RHC/EMBx: RA 4 PAP 20/12/15 PCW 15 PaSat 66% Juan CO/CI EMBx ISHLT Grade 0R [de-identified] : CT chest 3/25/24: IMPRESSION:No acute pleural or parenchymal abnormality. Is stable appearance of a moderate pericardial effusion  CT chest abd/pelvis 1/5/24: IMPRESSION: Question mild stercoral colitis. Expected postoperative changes in the chest. No fluid collection.  DSAs: 1/9: 0%  1/2: 0%  Heartcare: not collected yet

## 2024-08-27 NOTE — HISTORY OF PRESENT ILLNESS
[FreeTextEntry1] : 57 y/o M with h/o HFrEF, CAD s/p PCI (2008), HTN, DMT2 (A1c 8.3%) and CVA s/p TPA (2018), who is now s/p OHT 12/25/23 (ischemic time: 253min, CMV -/+, Toxo -/-) and presents for routine follow-up/biopsy.   Pt initially presented to [facility] via EMS after syncope reportedly requiring defibrillation, left AMA to come to [facility] on 11/1/23 where he was found to have Covid and intubated for respiratory failure. Then underwent L/RHC on 11/1 revealing  of LAD and RCA, elevated filling pressures and CI of 1.5 which prompted placement of IABP which was subsequently removed c/b PMVT cardiac arrest requiring replacement of IABP on 11/9. During hospitalization, treated for Staph epi, Enterococcus faecalis, Cutibacterium with IV abx.  Pt was evaluated for heart transplant, listed Status 2, ABO A, PRA 0% and on 12/25 a suitable donor was identified and he underwent OHT. His first RHC/EMBx revealed elevated filling pressures and diuretics were adjusted. His course was complicated by leukocytosis, however infectious workup was negative and CT scan revealed significant constipation requiring disimpaction with improvement.  Pt was persistently hyperglycemic, endocrine adjusted insulin regimen. He underwent his second biopsy which revealed normal filling pressures and ISHLT Grade 0R, and was discharged home 1/9/23.  Since transplant, he had a moderate pericardial effusion followed with serial TTE and CT imaging. He was seen by Dr Reyna and repeat Chest CT was done 3/25/24 demonstrating stable appearance of a moderate pericardial effusion. He was recommended to repeat again in May but pt did not return phone calls to schedule. Most recently 6/2024 effusion appeared small and reduced in size from prior imaging.  Last month in July 2024 he traveled to [place] for 3 weeks. He did not receive any vaccines or prophylaxis medications. Upon his return he did blood work 7/30 which was notable for subtherapeutic prograf level of 3.7 (goal 8-10). DSA 7/31 with new Class I DSA, low level MFI 2K. Subsequently allosure was sent 8/5, with rise from 0.12 to 0.18. Cellcept was increased from 1G BID to 1500mg BID. Despite several dose increases, prograf has still not reached therapeutic range.   Pt arrives today for outpatient follow-up visit 8 months post heart transplant. He is seen with his daughter present. Given persistently subtherapeutic tacro levels, slight rise in allosure and DSA's, he is here for a clinic visit, pill box check and TTE. He reports feeling well overall since returning from [place] ~2 weeks ago. He walks unlimited distances and denies SOB, chest discomfort, dizziness/lightheadedness or palpitations. He occasionally has LE edema, which improves with rest and leg elevation. He had one episode of diarrhea since last seen, otherwise normal BMs. He reports he is normotensive and continues to monitor BG with mango sensor. He has a follow-up endocrine appointment tomorrow. He does report weight gain over the past month or so; he ate high protein meals while away. Weight is ~200lb (from 187-188lbs when seen in June). He plans to exercise more and modify diet. He denies fever/chills. Pt reports 100% compliance with medications- he does the pill box himself. Pt is working as a .   Discussed diet modifications and the importance of staying consistent with tacrolimus ingestion on an empty stomach.

## 2024-09-03 LAB
ALBUMIN SERPL ELPH-MCNC: 4.6 G/DL
ALP BLD-CCNC: 100 U/L
ALT SERPL-CCNC: 19 U/L
ANION GAP SERPL CALC-SCNC: 15 MMOL/L
AST SERPL-CCNC: 20 U/L
BASOPHILS # BLD AUTO: 0.03 K/UL
BASOPHILS NFR BLD AUTO: 0.3 %
BILIRUB SERPL-MCNC: 0.2 MG/DL
BUN SERPL-MCNC: 21 MG/DL
CALCIUM SERPL-MCNC: 10.1 MG/DL
CHLORIDE SERPL-SCNC: 99 MMOL/L
CO2 SERPL-SCNC: 27 MMOL/L
CREAT SERPL-MCNC: 1.26 MG/DL
EGFR: 66 ML/MIN/1.73M2
EOSINOPHIL # BLD AUTO: 0.18 K/UL
EOSINOPHIL NFR BLD AUTO: 1.9 %
GLUCOSE SERPL-MCNC: 239 MG/DL
HCT VFR BLD CALC: 41.9 %
HGB BLD-MCNC: 13.8 G/DL
IMM GRANULOCYTES NFR BLD AUTO: 0.2 %
LYMPHOCYTES # BLD AUTO: 2.58 K/UL
LYMPHOCYTES NFR BLD AUTO: 27.6 %
MAGNESIUM SERPL-MCNC: 1.7 MG/DL
MAN DIFF?: NORMAL
MCHC RBC-ENTMCNC: 27.2 PG
MCHC RBC-ENTMCNC: 32.9 GM/DL
MCV RBC AUTO: 82.5 FL
MONOCYTES # BLD AUTO: 0.72 K/UL
MONOCYTES NFR BLD AUTO: 7.7 %
NEUTROPHILS # BLD AUTO: 5.81 K/UL
NEUTROPHILS NFR BLD AUTO: 62.3 %
PLATELET # BLD AUTO: 273 K/UL
POTASSIUM SERPL-SCNC: 3.9 MMOL/L
PROT SERPL-MCNC: 7.1 G/DL
RBC # BLD: 5.08 M/UL
RBC # FLD: 13.2 %
SODIUM SERPL-SCNC: 141 MMOL/L
TACROLIMUS SERPL-MCNC: 10.6 NG/ML
WBC # FLD AUTO: 9.34 K/UL

## 2024-09-05 LAB
CMV DNA SPEC QL NAA+PROBE: NOT DETECTED IU/ML
CMVPCR LOG: NOT DETECTED LOG10IU/ML

## 2024-09-09 ENCOUNTER — APPOINTMENT (OUTPATIENT)
Dept: THORACIC SURGERY | Facility: CLINIC | Age: 59
End: 2024-09-09
Payer: COMMERCIAL

## 2024-09-09 DIAGNOSIS — I31.39 OTHER PERICARDIAL EFFUSION (NONINFLAMMATORY): ICD-10-CM

## 2024-09-09 PROCEDURE — 99443: CPT

## 2024-09-09 NOTE — HISTORY OF PRESENT ILLNESS
[FreeTextEntry1] : Mr. DEVORAH VALENCIA, 58 year old male, never smoker, w/ hx of HLD, DMII, CAD, CVA in 2018, CHF, s/p heart transplant in Dec 2023, who presented with pericardial effusion since the transplant.   CT Chest on 3/25/24: - there is moderate pericardial effusion measuring about 20 Hounsfield units on CT attenuation, with no significant change compared to prior study - status post median sternotomy - there are old healed left rib fractures  TTE on 6/18/24:  - the pericardial effusion has decreased in size  TTE on 8/27/24: - The pericardium is thickened. There is a trace pericardial effusion with no evidence of hemodynamic compromise (or echocardiographic evidence of cardiac tamponade).  PHQ2 completed on 8/5/24.   Pt today to follow up with telephonic.

## 2024-09-09 NOTE — CONSULT LETTER
[Dear  ___] : Dear  [unfilled], [Consult Letter:] : I had the pleasure of evaluating your patient, [unfilled]. [Please see my note below.] : Please see my note below. [Consult Closing:] : Thank you very much for allowing me to participate in the care of this patient.  If you have any questions, please do not hesitate to contact me. [Sincerely,] : Sincerely, [FreeTextEntry2] : Dr. Barboza  [FreeTextEntry3] : Pratibha Reyna MD Attending Surgeon Division of Thoracic Surgery , [facility] [employer] [facility]

## 2024-09-09 NOTE — ASSESSMENT
[FreeTextEntry1] : Mr. DEVORAH VALENCIA, 58 year old male, never smoker, w/ hx of HLD, DMII, CAD, CVA in 2018, CHF, s/p heart transplant in Dec 2023, who presented with pericardial effusion since the transplant.   TTE on 8/27/24: - The pericardium is thickened. There is a trace pericardial effusion with no evidence of hemodynamic compromise (or echocardiographic evidence of cardiac tamponade).  I have reviewed the patient's medical records and diagnostic images at time of this office consultation and have made the following recommendation: 1. TTM reviewed with pt, trace pericardial effusion. Continue follow up with cardiology. RTC PRN   I, Dr. RIVAS, FABIANA WHITT, personally performed the evaluation and management (E/M) services for this established patient who presents today with (a) new problem(s)/exacerbation of (an) existing condition(s).  That E/M includes conducting the examination, assessing all new/exacerbated conditions, and establishing a new plan of care.  Today, my ACP, JOANNE Wagner was here to observe my evaluation and management services for this new problem/exacerbated condition to be followed going forward.

## 2024-09-09 NOTE — REASON FOR VISIT
Statement Selected [Follow-Up: _____] : a [unfilled] follow-up visit [Home] : at home, [unfilled] , at the time of the visit. [Medical Office: ([facility])___] : at the medical office located in  [Verbal consent obtained from patient] : the patient, [unfilled] positive S1/positive S2

## 2024-09-09 NOTE — CONSULT LETTER
[Dear  ___] : Dear  [unfilled], [Consult Letter:] : I had the pleasure of evaluating your patient, [unfilled]. [Please see my note below.] : Please see my note below. [Consult Closing:] : Thank you very much for allowing me to participate in the care of this patient.  If you have any questions, please do not hesitate to contact me. [Sincerely,] : Sincerely, [FreeTextEntry2] : Dr. Barboza  [FreeTextEntry3] : Pratibha Reyna MD Attending Surgeon Division of Thoracic Surgery , [facility] [employer] [facility]

## 2024-09-09 NOTE — REASON FOR VISIT
[Follow-Up: _____] : a [unfilled] follow-up visit [Home] : at home, [unfilled] , at the time of the visit. [Medical Office: ([facility])___] : at the medical office located in  [Verbal consent obtained from patient] : the patient, [unfilled]

## 2024-10-15 NOTE — PROGRESS NOTE ADULT - SUBJECTIVE AND OBJECTIVE BOX
Patient seen and examined at bedside.    Overnight Events: No acute events overnight. Denies chest pain, palpitations, or shortness of breath.       REVIEW OF SYSTEMS:  Constitutional:     [ x] negative [ ] fevers [ ] chills [ ] weight loss [ ] weight gain  HEENT:                  [ x] negative [ ] dry eyes [ ] eye irritation [ ] postnasal drip [ ] nasal congestion  CV:                         [x ] negative  [ ] chest pain [ ] orthopnea [ ] palpitations [ ] murmur  Resp:                     [ x] negative [ ] cough [ ] shortness of breath [ ] dyspnea [ ] wheezing [ ] sputum [ ]hemoptysis  GI:                          [ x] negative [ ] nausea [ ] vomiting [ ] diarrhea [ ] constipation [ ] abd pain [ ] dysphagia   :                        [ x] negative [ ] dysuria [ ] nocturia [ ] hematuria [ ] increased urinary frequency  Musculoskeletal: [ x] negative [ ] back pain [ ] myalgias [ ] arthralgias [ ] fracture  Skin:                       [ x] negative [ ] rash [ ] itch  Neurological:        [ x] negative [ ] headache [ ] dizziness [ ] syncope [ ] weakness [ ] numbness  Psychiatric:           [ x] negative [ ] anxiety [ ] depression  Endocrine:            [ x] negative [ ] diabetes [ ] thyroid problem  Heme/Lymph:      [ x] negative [ ] anemia [ ] bleeding problem  Allergic/Immune: [x ] negative [ ] itchy eyes [ ] nasal discharge [ ] hives [ ] angioedema    [x ] All other systems negative  [ ] Unable to assess ROS due to    Current Meds:  aspirin  chewable 81 milliGRAM(s) Oral daily  atorvastatin 80 milliGRAM(s) Oral at bedtime  budesonide  80 MICROgram(s)/formoterol 4.5 MICROgram(s) Inhaler 2 Puff(s) Inhalation two times a day  carvedilol 25 milliGRAM(s) Oral every 12 hours  chlorhexidine 2% Cloths 1 Application(s) Topical daily  clobetasol 0.05% Ointment 1 Application(s) Topical two times a day  heparin  Infusion 1300 Unit(s)/Hr IV Continuous <Continuous>  hydrALAZINE 75 milliGRAM(s) Oral three times a day  hydrOXYzine hydrochloride 25 milliGRAM(s) Oral two times a day PRN  insulin glargine Injectable (LANTUS) 12 Unit(s) SubCutaneous at bedtime  insulin lispro (ADMELOG) corrective regimen sliding scale   SubCutaneous three times a day before meals  insulin lispro (ADMELOG) corrective regimen sliding scale   SubCutaneous at bedtime  insulin lispro Injectable (ADMELOG) 9 Unit(s) SubCutaneous three times a day before meals  isosorbide   dinitrate Tablet (ISORDIL) 30 milliGRAM(s) Oral three times a day  polyethylene glycol 3350 17 Gram(s) Oral two times a day  senna 2 Tablet(s) Oral at bedtime      PAST MEDICAL & SURGICAL HISTORY:  HTN (hypertension)      CAD (coronary artery disease)  2009; stent      Intracranial hemorrhage  2008      Respiratory arrest  december 1st      Myocardial infarction, unspecified MI type, unspecified artery      History of coronary artery stent placement          Vitals:  T(F): 98.9 (12-11), Max: 98.9 (12-11)  HR: 83 (12-11) (72 - 93)  BP: --  RR: 21 (12-11)  SpO2: 95% (12-11)  I&O's Summary    10 Dec 2023 07:01  -  11 Dec 2023 07:00  --------------------------------------------------------  IN: 1786 mL / OUT: 2100 mL / NET: -314 mL    11 Dec 2023 07:01  -  11 Dec 2023 12:16  --------------------------------------------------------  IN: 56 mL / OUT: 500 mL / NET: -444 mL        Physical Exam:  Appearance: No acute distress; well appearing  Eyes: EOMI, no scleral icterus   HEENT: Normal oral mucosa  Cardiovascular: RRR, S1, S2, no murmurs, rubs, or gallops; no edema; no JVD. IABP in place.   Respiratory: Clear to auscultation bilaterally, no auditory stridor   Gastrointestinal: soft, non-tender, non-distended with normal bowel sounds  Musculoskeletal: No clubbing; no joint deformity   Neurologic: Moving all 4 extremities spontaneously, sensation grossly intact  Psychiatry: AAOx3, mood & affect appropriate  Skin: No rashes, ecchymoses, or cyanosis                          11.4   8.69  )-----------( 114      ( 11 Dec 2023 01:31 )             35.2     12-11    136  |  107  |  29<H>  ----------------------------<  141<H>  4.3   |  20<L>  |  0.96    Ca    9.5      11 Dec 2023 01:31  Phos  2.7     12-11  Mg     2.2     12-11    TPro  6.4  /  Alb  3.6  /  TBili  0.2  /  DBili  x   /  AST  37  /  ALT  126<H>  /  AlkPhos  58  12-11    PT/INR - ( 11 Dec 2023 01:31 )   PT: 11.1 sec;   INR: 1.06 ratio         PTT - ( 11 Dec 2023 01:31 )  PTT:67.6 sec                 0.1

## 2024-10-23 ENCOUNTER — APPOINTMENT (OUTPATIENT)
Dept: HEART FAILURE | Facility: CLINIC | Age: 59
End: 2024-10-23

## 2024-10-24 ENCOUNTER — OUTPATIENT (OUTPATIENT)
Dept: OUTPATIENT SERVICES | Facility: HOSPITAL | Age: 59
LOS: 1 days | End: 2024-10-24
Payer: COMMERCIAL

## 2024-10-24 ENCOUNTER — RESULT REVIEW (OUTPATIENT)
Age: 59
End: 2024-10-24

## 2024-10-24 ENCOUNTER — APPOINTMENT (OUTPATIENT)
Dept: CV DIAGNOSITCS | Facility: HOSPITAL | Age: 59
End: 2024-10-24

## 2024-10-24 ENCOUNTER — APPOINTMENT (OUTPATIENT)
Dept: HEART FAILURE | Facility: CLINIC | Age: 59
End: 2024-10-24

## 2024-10-24 VITALS
SYSTOLIC BLOOD PRESSURE: 152 MMHG | OXYGEN SATURATION: 96 % | HEIGHT: 69 IN | DIASTOLIC BLOOD PRESSURE: 98 MMHG | BODY MASS INDEX: 31.1 KG/M2 | HEART RATE: 113 BPM | WEIGHT: 210 LBS | TEMPERATURE: 98.5 F

## 2024-10-24 DIAGNOSIS — Z94.1 HEART TRANSPLANT STATUS: Chronic | ICD-10-CM

## 2024-10-24 DIAGNOSIS — Z94.1 HEART TRANSPLANT STATUS: ICD-10-CM

## 2024-10-24 DIAGNOSIS — Z95.5 PRESENCE OF CORONARY ANGIOPLASTY IMPLANT AND GRAFT: Chronic | ICD-10-CM

## 2024-10-24 DIAGNOSIS — D84.9 IMMUNODEFICIENCY, UNSPECIFIED: ICD-10-CM

## 2024-10-24 LAB
ALBUMIN SERPL ELPH-MCNC: 4.7 G/DL
ALP BLD-CCNC: 94 U/L
ALT SERPL-CCNC: 18 U/L
ANION GAP SERPL CALC-SCNC: 13 MMOL/L
AST SERPL-CCNC: 31 U/L
BASOPHILS # BLD AUTO: 0.02 K/UL
BASOPHILS NFR BLD AUTO: 0.3 %
BILIRUB SERPL-MCNC: 0.3 MG/DL
BUN SERPL-MCNC: 25 MG/DL
CALCIUM SERPL-MCNC: 10 MG/DL
CHLORIDE SERPL-SCNC: 97 MMOL/L
CO2 SERPL-SCNC: 28 MMOL/L
CREAT SERPL-MCNC: 1.41 MG/DL
EGFR: 57 ML/MIN/1.73M2
EOSINOPHIL # BLD AUTO: 0.13 K/UL
EOSINOPHIL NFR BLD AUTO: 1.8 %
GLUCOSE SERPL-MCNC: 156 MG/DL
HCT VFR BLD CALC: 41.7 %
HGB BLD-MCNC: 13.2 G/DL
IMM GRANULOCYTES NFR BLD AUTO: 1.5 %
LYMPHOCYTES # BLD AUTO: 1.51 K/UL
LYMPHOCYTES NFR BLD AUTO: 20.9 %
MAGNESIUM SERPL-MCNC: 1.7 MG/DL
MAN DIFF?: NORMAL
MCHC RBC-ENTMCNC: 26.1 PG
MCHC RBC-ENTMCNC: 31.7 GM/DL
MCV RBC AUTO: 82.6 FL
MONOCYTES # BLD AUTO: 0.63 K/UL
MONOCYTES NFR BLD AUTO: 8.7 %
NEUTROPHILS # BLD AUTO: 4.82 K/UL
NEUTROPHILS NFR BLD AUTO: 66.8 %
PLATELET # BLD AUTO: 231 K/UL
POTASSIUM SERPL-SCNC: 3.5 MMOL/L
PROT SERPL-MCNC: 6.9 G/DL
RBC # BLD: 5.05 M/UL
RBC # FLD: 14.6 %
SODIUM SERPL-SCNC: 139 MMOL/L
TACROLIMUS SERPL-MCNC: 11.1 NG/ML
WBC # FLD AUTO: 7.22 K/UL

## 2024-10-24 PROCEDURE — 93306 TTE W/DOPPLER COMPLETE: CPT | Mod: 26

## 2024-10-24 PROCEDURE — 93356 MYOCRD STRAIN IMG SPCKL TRCK: CPT

## 2024-10-24 PROCEDURE — 99214 OFFICE O/P EST MOD 30 MIN: CPT

## 2024-10-24 PROCEDURE — 93306 TTE W/DOPPLER COMPLETE: CPT

## 2024-11-13 ENCOUNTER — APPOINTMENT (OUTPATIENT)
Dept: ENDOCRINOLOGY | Facility: CLINIC | Age: 59
End: 2024-11-13

## 2024-11-26 LAB
ALBUMIN SERPL ELPH-MCNC: 4.2 G/DL
ALP BLD-CCNC: 100 U/L
ALT SERPL-CCNC: 17 U/L
ANION GAP SERPL CALC-SCNC: 14 MMOL/L
AST SERPL-CCNC: 22 U/L
BASOPHILS # BLD AUTO: 0.02 K/UL
BASOPHILS NFR BLD AUTO: 0.4 %
BILIRUB SERPL-MCNC: 0.3 MG/DL
BUN SERPL-MCNC: 22 MG/DL
CALCIUM SERPL-MCNC: 9.5 MG/DL
CHLORIDE SERPL-SCNC: 97 MMOL/L
CO2 SERPL-SCNC: 24 MMOL/L
CREAT SERPL-MCNC: 1.38 MG/DL
EGFR: 59 ML/MIN/1.73M2
EOSINOPHIL # BLD AUTO: 0.16 K/UL
EOSINOPHIL NFR BLD AUTO: 2.9 %
GLUCOSE SERPL-MCNC: 373 MG/DL
HCT VFR BLD CALC: 43 %
HGB BLD-MCNC: 14.3 G/DL
IMM GRANULOCYTES NFR BLD AUTO: 0.4 %
LYMPHOCYTES # BLD AUTO: 1.61 K/UL
LYMPHOCYTES NFR BLD AUTO: 28.7 %
MAGNESIUM SERPL-MCNC: 1.8 MG/DL
MAN DIFF?: NORMAL
MCHC RBC-ENTMCNC: 26.7 PG
MCHC RBC-ENTMCNC: 33.3 G/DL
MCV RBC AUTO: 80.2 FL
MONOCYTES # BLD AUTO: 0.61 K/UL
MONOCYTES NFR BLD AUTO: 10.9 %
NEUTROPHILS # BLD AUTO: 3.19 K/UL
NEUTROPHILS NFR BLD AUTO: 56.7 %
PLATELET # BLD AUTO: 266 K/UL
POTASSIUM SERPL-SCNC: 4.1 MMOL/L
PROT SERPL-MCNC: 6.6 G/DL
RBC # BLD: 5.36 M/UL
RBC # FLD: 13.4 %
SODIUM SERPL-SCNC: 135 MMOL/L
TACROLIMUS SERPL-MCNC: 10 NG/ML
WBC # FLD AUTO: 5.61 K/UL

## 2024-11-27 LAB
CMV DNA SPEC QL NAA+PROBE: NOT DETECTED IU/ML
CMVPCR LOG: NOT DETECTED LOG10IU/ML

## 2024-12-03 RX ORDER — INSULIN LISPRO 100 [IU]/ML
100 INJECTION, SOLUTION INTRAVENOUS; SUBCUTANEOUS
Qty: 15 | Refills: 5 | Status: ACTIVE | COMMUNITY
Start: 2024-12-03 | End: 1900-01-01

## 2024-12-06 ENCOUNTER — APPOINTMENT (OUTPATIENT)
Dept: ENDOCRINOLOGY | Facility: CLINIC | Age: 59
End: 2024-12-06
Payer: COMMERCIAL

## 2024-12-06 PROCEDURE — G0108 DIAB MANAGE TRN  PER INDIV: CPT

## 2024-12-10 ENCOUNTER — APPOINTMENT (OUTPATIENT)
Dept: CV DIAGNOSITCS | Facility: HOSPITAL | Age: 59
End: 2024-12-10

## 2024-12-10 ENCOUNTER — RESULT REVIEW (OUTPATIENT)
Age: 59
End: 2024-12-10

## 2024-12-10 ENCOUNTER — OUTPATIENT (OUTPATIENT)
Dept: OUTPATIENT SERVICES | Facility: HOSPITAL | Age: 59
LOS: 1 days | End: 2024-12-10
Payer: COMMERCIAL

## 2024-12-10 DIAGNOSIS — Z94.1 HEART TRANSPLANT STATUS: Chronic | ICD-10-CM

## 2024-12-10 DIAGNOSIS — Z95.5 PRESENCE OF CORONARY ANGIOPLASTY IMPLANT AND GRAFT: Chronic | ICD-10-CM

## 2024-12-10 DIAGNOSIS — Z94.1 HEART TRANSPLANT STATUS: ICD-10-CM

## 2024-12-10 PROCEDURE — 93356 MYOCRD STRAIN IMG SPCKL TRCK: CPT

## 2024-12-10 PROCEDURE — 93308 TTE F-UP OR LMTD: CPT | Mod: 26

## 2024-12-10 PROCEDURE — 93306 TTE W/DOPPLER COMPLETE: CPT

## 2024-12-11 NOTE — DISCHARGE NOTE ADULT - CAREGIVER NAME
Brando Leehy is to return to the clinic to see Janet Yates MD in  4 weeks for a 20 min OFV for follow-up     Referral:  None    Labs to be done day of next visit:  None     Labs to be done within 5 days prior to next visit:  CBC with diff  CMP  plasma cell profile   none      Imaging to be done within a week prior to next visit:  None    Labs to be done now:  None     Imaging to be done now:  None    Misc Orders:  None    Diagnosis    1. Multiple myeloma not having achieved remission  (CMD)    2. Spinal stenosis of lumbar region, unspecified whether neurogenic claudication present        Noni Arriaga PA-C      same as above

## 2024-12-19 ENCOUNTER — TRANSCRIPTION ENCOUNTER (OUTPATIENT)
Age: 59
End: 2024-12-19

## 2024-12-19 ENCOUNTER — OUTPATIENT (OUTPATIENT)
Dept: OUTPATIENT SERVICES | Facility: HOSPITAL | Age: 59
LOS: 1 days | End: 2024-12-19
Payer: COMMERCIAL

## 2024-12-19 ENCOUNTER — RESULT REVIEW (OUTPATIENT)
Age: 59
End: 2024-12-19

## 2024-12-19 VITALS — HEART RATE: 108 BPM | HEIGHT: 69 IN | WEIGHT: 186.95 LBS

## 2024-12-19 VITALS
HEART RATE: 93 BPM | OXYGEN SATURATION: 97 % | RESPIRATION RATE: 16 BRPM | DIASTOLIC BLOOD PRESSURE: 77 MMHG | SYSTOLIC BLOOD PRESSURE: 153 MMHG

## 2024-12-19 DIAGNOSIS — Z95.5 PRESENCE OF CORONARY ANGIOPLASTY IMPLANT AND GRAFT: Chronic | ICD-10-CM

## 2024-12-19 DIAGNOSIS — Z01.818 ENCOUNTER FOR OTHER PREPROCEDURAL EXAMINATION: ICD-10-CM

## 2024-12-19 DIAGNOSIS — Z94.1 HEART TRANSPLANT STATUS: ICD-10-CM

## 2024-12-19 DIAGNOSIS — Z94.1 HEART TRANSPLANT STATUS: Chronic | ICD-10-CM

## 2024-12-19 LAB
A1C WITH ESTIMATED AVERAGE GLUCOSE RESULT: 7.9 % — HIGH (ref 4–5.6)
ALBUMIN SERPL ELPH-MCNC: 4.3 G/DL — SIGNIFICANT CHANGE UP (ref 3.3–5)
ALP SERPL-CCNC: 94 U/L — SIGNIFICANT CHANGE UP (ref 40–120)
ALT FLD-CCNC: 19 U/L — SIGNIFICANT CHANGE UP (ref 10–45)
ANION GAP SERPL CALC-SCNC: 14 MMOL/L — SIGNIFICANT CHANGE UP (ref 5–17)
ANION GAP SERPL CALC-SCNC: 18 MMOL/L — HIGH (ref 5–17)
AST SERPL-CCNC: 19 U/L — SIGNIFICANT CHANGE UP (ref 10–40)
BASOPHILS # BLD AUTO: 0.03 K/UL — SIGNIFICANT CHANGE UP (ref 0–0.2)
BASOPHILS NFR BLD AUTO: 0.3 % — SIGNIFICANT CHANGE UP (ref 0–2)
BILIRUB SERPL-MCNC: 0.2 MG/DL — SIGNIFICANT CHANGE UP (ref 0.2–1.2)
BUN SERPL-MCNC: 24 MG/DL — HIGH (ref 7–23)
BUN SERPL-MCNC: 25 MG/DL — HIGH (ref 7–23)
CALCIUM SERPL-MCNC: 10.1 MG/DL — SIGNIFICANT CHANGE UP (ref 8.4–10.5)
CALCIUM SERPL-MCNC: 9.6 MG/DL — SIGNIFICANT CHANGE UP (ref 8.4–10.5)
CHLORIDE SERPL-SCNC: 96 MMOL/L — SIGNIFICANT CHANGE UP (ref 96–108)
CHLORIDE SERPL-SCNC: 99 MMOL/L — SIGNIFICANT CHANGE UP (ref 96–108)
CO2 SERPL-SCNC: 24 MMOL/L — SIGNIFICANT CHANGE UP (ref 22–31)
CO2 SERPL-SCNC: 25 MMOL/L — SIGNIFICANT CHANGE UP (ref 22–31)
CREAT SERPL-MCNC: 1.22 MG/DL — SIGNIFICANT CHANGE UP (ref 0.5–1.3)
CREAT SERPL-MCNC: 1.34 MG/DL — HIGH (ref 0.5–1.3)
EGFR: 61 ML/MIN/1.73M2 — SIGNIFICANT CHANGE UP
EGFR: 68 ML/MIN/1.73M2 — SIGNIFICANT CHANGE UP
EOSINOPHIL # BLD AUTO: 0.23 K/UL — SIGNIFICANT CHANGE UP (ref 0–0.5)
EOSINOPHIL NFR BLD AUTO: 2.3 % — SIGNIFICANT CHANGE UP (ref 0–6)
ESTIMATED AVERAGE GLUCOSE: 180 MG/DL — HIGH (ref 68–114)
GLUCOSE SERPL-MCNC: 177 MG/DL — HIGH (ref 70–99)
GLUCOSE SERPL-MCNC: 194 MG/DL — HIGH (ref 70–99)
HCT VFR BLD CALC: 41.6 % — SIGNIFICANT CHANGE UP (ref 39–50)
HCT VFR BLD CALC: 41.8 % — SIGNIFICANT CHANGE UP (ref 39–50)
HGB BLD-MCNC: 13.4 G/DL — SIGNIFICANT CHANGE UP (ref 13–17)
HGB BLD-MCNC: 13.6 G/DL — SIGNIFICANT CHANGE UP (ref 13–17)
IMM GRANULOCYTES NFR BLD AUTO: 0.5 % — SIGNIFICANT CHANGE UP (ref 0–0.9)
LYMPHOCYTES # BLD AUTO: 1.84 K/UL — SIGNIFICANT CHANGE UP (ref 1–3.3)
LYMPHOCYTES # BLD AUTO: 18.3 % — SIGNIFICANT CHANGE UP (ref 13–44)
MAGNESIUM SERPL-MCNC: 1.5 MG/DL — LOW (ref 1.6–2.6)
MCHC RBC-ENTMCNC: 26 PG — LOW (ref 27–34)
MCHC RBC-ENTMCNC: 26.5 PG — LOW (ref 27–34)
MCHC RBC-ENTMCNC: 32.2 G/DL — SIGNIFICANT CHANGE UP (ref 32–36)
MCHC RBC-ENTMCNC: 32.5 G/DL — SIGNIFICANT CHANGE UP (ref 32–36)
MCV RBC AUTO: 80.6 FL — SIGNIFICANT CHANGE UP (ref 80–100)
MCV RBC AUTO: 81.3 FL — SIGNIFICANT CHANGE UP (ref 80–100)
MONOCYTES # BLD AUTO: 0.78 K/UL — SIGNIFICANT CHANGE UP (ref 0–0.9)
MONOCYTES NFR BLD AUTO: 7.8 % — SIGNIFICANT CHANGE UP (ref 2–14)
NEUTROPHILS # BLD AUTO: 7.1 K/UL — SIGNIFICANT CHANGE UP (ref 1.8–7.4)
NEUTROPHILS NFR BLD AUTO: 70.8 % — SIGNIFICANT CHANGE UP (ref 43–77)
NRBC # BLD: 0 /100 WBCS — SIGNIFICANT CHANGE UP (ref 0–0)
NRBC # BLD: 0 /100 WBCS — SIGNIFICANT CHANGE UP (ref 0–0)
PLATELET # BLD AUTO: 257 K/UL — SIGNIFICANT CHANGE UP (ref 150–400)
PLATELET # BLD AUTO: 266 K/UL — SIGNIFICANT CHANGE UP (ref 150–400)
POTASSIUM SERPL-MCNC: 3.2 MMOL/L — LOW (ref 3.5–5.3)
POTASSIUM SERPL-MCNC: 3.6 MMOL/L — SIGNIFICANT CHANGE UP (ref 3.5–5.3)
POTASSIUM SERPL-SCNC: 3.2 MMOL/L — LOW (ref 3.5–5.3)
POTASSIUM SERPL-SCNC: 3.6 MMOL/L — SIGNIFICANT CHANGE UP (ref 3.5–5.3)
PROT SERPL-MCNC: 6.9 G/DL — SIGNIFICANT CHANGE UP (ref 6–8.3)
RBC # BLD: 5.14 M/UL — SIGNIFICANT CHANGE UP (ref 4.2–5.8)
RBC # BLD: 5.16 M/UL — SIGNIFICANT CHANGE UP (ref 4.2–5.8)
RBC # FLD: 13.5 % — SIGNIFICANT CHANGE UP (ref 10.3–14.5)
RBC # FLD: 13.6 % — SIGNIFICANT CHANGE UP (ref 10.3–14.5)
SODIUM SERPL-SCNC: 138 MMOL/L — SIGNIFICANT CHANGE UP (ref 135–145)
SODIUM SERPL-SCNC: 138 MMOL/L — SIGNIFICANT CHANGE UP (ref 135–145)
TACROLIMUS SERPL-MCNC: 6.6 NG/ML — SIGNIFICANT CHANGE UP
WBC # BLD: 10.03 K/UL — SIGNIFICANT CHANGE UP (ref 3.8–10.5)
WBC # BLD: 10.11 K/UL — SIGNIFICANT CHANGE UP (ref 3.8–10.5)
WBC # FLD AUTO: 10.03 K/UL — SIGNIFICANT CHANGE UP (ref 3.8–10.5)
WBC # FLD AUTO: 10.11 K/UL — SIGNIFICANT CHANGE UP (ref 3.8–10.5)

## 2024-12-19 PROCEDURE — C1889: CPT

## 2024-12-19 PROCEDURE — C1769: CPT

## 2024-12-19 PROCEDURE — 93308 TTE F-UP OR LMTD: CPT

## 2024-12-19 PROCEDURE — 93325 DOPPLER ECHO COLOR FLOW MAPG: CPT

## 2024-12-19 PROCEDURE — C1753: CPT

## 2024-12-19 PROCEDURE — 80053 COMPREHEN METABOLIC PANEL: CPT

## 2024-12-19 PROCEDURE — C1894: CPT

## 2024-12-19 PROCEDURE — 88346 IMFLUOR 1ST 1ANTB STAIN PX: CPT

## 2024-12-19 PROCEDURE — 88342 IMHCHEM/IMCYTCHM 1ST ANTB: CPT

## 2024-12-19 PROCEDURE — 88307 TISSUE EXAM BY PATHOLOGIST: CPT

## 2024-12-19 PROCEDURE — 92978 ENDOLUMINL IVUS OCT C 1ST: CPT | Mod: 26,LM

## 2024-12-19 PROCEDURE — 93458 L HRT ARTERY/VENTRICLE ANGIO: CPT | Mod: 26,59

## 2024-12-19 PROCEDURE — 93010 ELECTROCARDIOGRAM REPORT: CPT

## 2024-12-19 PROCEDURE — 80048 BASIC METABOLIC PNL TOTAL CA: CPT

## 2024-12-19 PROCEDURE — 80061 LIPID PANEL: CPT

## 2024-12-19 PROCEDURE — 88346 IMFLUOR 1ST 1ANTB STAIN PX: CPT | Mod: 26

## 2024-12-19 PROCEDURE — 85025 COMPLETE CBC W/AUTO DIFF WBC: CPT

## 2024-12-19 PROCEDURE — 83036 HEMOGLOBIN GLYCOSYLATED A1C: CPT

## 2024-12-19 PROCEDURE — 99152 MOD SED SAME PHYS/QHP 5/>YRS: CPT

## 2024-12-19 PROCEDURE — 93308 TTE F-UP OR LMTD: CPT | Mod: 26

## 2024-12-19 PROCEDURE — 93005 ELECTROCARDIOGRAM TRACING: CPT

## 2024-12-19 PROCEDURE — 92978 ENDOLUMINL IVUS OCT C 1ST: CPT | Mod: LM

## 2024-12-19 PROCEDURE — 93505 ENDOMYOCARDIAL BIOPSY: CPT

## 2024-12-19 PROCEDURE — 93505 ENDOMYOCARDIAL BIOPSY: CPT | Mod: 26

## 2024-12-19 PROCEDURE — 88307 TISSUE EXAM BY PATHOLOGIST: CPT | Mod: 26

## 2024-12-19 PROCEDURE — 85027 COMPLETE CBC AUTOMATED: CPT

## 2024-12-19 PROCEDURE — 82803 BLOOD GASES ANY COMBINATION: CPT

## 2024-12-19 PROCEDURE — 93325 DOPPLER ECHO COLOR FLOW MAPG: CPT | Mod: 26

## 2024-12-19 PROCEDURE — 83735 ASSAY OF MAGNESIUM: CPT

## 2024-12-19 PROCEDURE — C1887: CPT

## 2024-12-19 PROCEDURE — 80197 ASSAY OF TACROLIMUS: CPT

## 2024-12-19 PROCEDURE — 93458 L HRT ARTERY/VENTRICLE ANGIO: CPT | Mod: 59

## 2024-12-19 PROCEDURE — 88342 IMHCHEM/IMCYTCHM 1ST ANTB: CPT | Mod: 26

## 2024-12-19 RX ORDER — SODIUM CHLORIDE 9 MG/ML
1000 INJECTION, SOLUTION INTRAMUSCULAR; INTRAVENOUS; SUBCUTANEOUS
Refills: 0 | Status: ACTIVE | OUTPATIENT
Start: 2024-12-19 | End: 2024-12-19

## 2024-12-19 RX ORDER — AZITHROMYCIN 250 MG/1
1 TABLET, FILM COATED ORAL
Refills: 0 | DISCHARGE

## 2024-12-19 RX ORDER — SODIUM CHLORIDE 9 MG/ML
1000 INJECTION, SOLUTION INTRAMUSCULAR; INTRAVENOUS; SUBCUTANEOUS
Refills: 0 | Status: DISCONTINUED | OUTPATIENT
Start: 2024-12-19 | End: 2024-12-19

## 2024-12-19 RX ORDER — MAGNESIUM, ALUMINUM HYDROXIDE 200-225/5
30 SUSPENSION, ORAL (FINAL DOSE FORM) ORAL EVERY 4 HOURS
Refills: 0 | Status: ACTIVE | OUTPATIENT
Start: 2024-12-19 | End: 2025-11-17

## 2024-12-19 RX ORDER — SODIUM CHLORIDE 9 MG/ML
3 INJECTION, SOLUTION INTRAMUSCULAR; INTRAVENOUS; SUBCUTANEOUS EVERY 8 HOURS
Refills: 0 | Status: ACTIVE | OUTPATIENT
Start: 2024-12-19 | End: 2025-11-17

## 2024-12-19 RX ORDER — MYCOPHENOLATE MOFETIL 500 MG/1
3 TABLET ORAL
Refills: 0 | DISCHARGE

## 2024-12-19 NOTE — ASU DISCHARGE PLAN (ADULT/PEDIATRIC) - CARE PROVIDER_API CALL
Arsen Sanchez  Interventional Cardiology  [address] [place] [address], [address] [place] [id number]  Phone: [phone]  Fax: [phone]  Follow Up Time:

## 2024-12-19 NOTE — ASU PATIENT PROFILE, ADULT - FALL HARM RISK - UNIVERSAL INTERVENTIONS
Bed in lowest position, wheels locked, appropriate side rails in place/Call bell, personal items and telephone in reach/Instruct patient to call for assistance before getting out of bed or chair/Non-slip footwear when patient is out of bed/[place] to call system/Physically safe environment - no spills, clutter or unnecessary equipment/Purposeful Proactive Rounding/Room/bathroom lighting operational, light cord in reach

## 2024-12-19 NOTE — H&P CARDIOLOGY - HISTORY OF PRESENT ILLNESS
59 year old Male with iCMP s/p HTN, DMT2 (A1c 8.3%) and CVA s/p TPA (2018), s/p OHT 12/25/2024. Since discharge, with serial EMBx obtained (1/2, 1/9, 1/16, 2/6, 2/20). Notably, a moderate pericardial effusion was noted, 1.3cm adjacent to the RV, was first noted on interval TTE 1/16. 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   S/p another RHC with EMBx with elevated filling pressures with preserved CO/CI in the setting of moderate pericardial effusion. Admitted inpatient for purpose of drainage.  59 year old Male with iCMP s/p HTN, DMT2 (A1c 8.3%) and CVA s/p TPA (2018), s/p OHT 12/25/2024. Since discharge, with serial EMBx obtained (1/2, 1/9, 1/16, 2/6, 2/20). Notably, a moderate pericardial effusion was noted, 1.3cm adjacent to the RV, was first noted on interval TTE 1/16. 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 on S/p another RHC with EMBx with elevated filling pressures with preserved CO/CI in the setting of moderate pericardial effusion. Patient was seen by Dr. Reyna and repeat CT chest was dpme 3/25/24 with stable mod pericardial effusion. Patient  59 year old Male with iCMP s/p HTN, DMT2 (A1c 8.3%) and CVA s/p TPA (2018), s/p OHT 12/25/2024. Since discharge, with serial EMBx obtained (1/2, 1/9, 1/16, 2/6, 2/20). Notably, a moderate pericardial effusion was noted, 1.3cm adjacent to the RV, was first noted on interval TTE 1/16. 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 on S/p another RHC with EMBx with elevated filling pressures with preserved CO/CI in the setting of moderate pericardial effusion. Patient was seen by Dr. Reyna and repeat CT chest was done 3/25/24 with stable mod pericardial effusion. Patient most recent 6/2024 effusion appeared small and reduced in size from prior imaging.  59 year old Male with iCMP s/p HTN, DMT2 (A1c 8.3%) and CVA s/p TPA (2018), s/p OHT 12/25/2024. Since discharge, with serial EMBx obtained (1/2, 1/9, 1/16, 2/6, 2/20). Notably, a moderate pericardial effusion was noted, 1.3cm adjacent to the RV, was first noted on interval TTE 1/16. 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 on S/p another RHC with EMBx with elevated filling pressures with preserved CO/CI in the setting of moderate pericardial effusion. Patient was seen by Dr. Reyna and repeat CT chest was done 3/25/24 with stable mod pericardial effusion. Patient most recent 6/2024 effusion appeared small and reduced in size from prior imaging. Patient states that he has been feeling well. Now presents for one year post transplant LHC and RHC with biopsy.     Currently denies chest pain, palpitations, orthopnea or PND.

## 2024-12-19 NOTE — ASU DISCHARGE PLAN (ADULT/PEDIATRIC) - FINANCIAL ASSISTANCE
[facility] provides services at a reduced cost to those who are determined to be eligible through [facility]’s financial assistance program. Information regarding [facility]’s financial assistance program can be found by going to [email or web address] or by calling [phone].

## 2024-12-20 LAB
CHOLEST SERPL-MCNC: 149 MG/DL — SIGNIFICANT CHANGE UP
CMV DNA CSF QL NAA+PROBE: SIGNIFICANT CHANGE UP IU/ML
CMV DNA SPEC NAA+PROBE-LOG#: SIGNIFICANT CHANGE UP LOG10IU/ML
HDLC SERPL-MCNC: 33 MG/DL — LOW
LIPID PNL WITH DIRECT LDL SERPL: 91 MG/DL — SIGNIFICANT CHANGE UP
NON HDL CHOLESTEROL: 116 MG/DL — SIGNIFICANT CHANGE UP
SURGICAL PATHOLOGY STUDY: SIGNIFICANT CHANGE UP
TRIGL SERPL-MCNC: 144 MG/DL — SIGNIFICANT CHANGE UP

## 2024-12-23 ENCOUNTER — APPOINTMENT (OUTPATIENT)
Dept: HEART FAILURE | Facility: CLINIC | Age: 59
End: 2024-12-23

## 2024-12-23 VITALS
WEIGHT: 206 LBS | DIASTOLIC BLOOD PRESSURE: 78 MMHG | HEART RATE: 120 BPM | OXYGEN SATURATION: 95 % | SYSTOLIC BLOOD PRESSURE: 120 MMHG | HEIGHT: 69 IN | TEMPERATURE: 98.6 F | BODY MASS INDEX: 30.51 KG/M2

## 2024-12-23 DIAGNOSIS — Z94.1 HEART TRANSPLANT STATUS: ICD-10-CM

## 2024-12-23 DIAGNOSIS — I10 ESSENTIAL (PRIMARY) HYPERTENSION: ICD-10-CM

## 2024-12-23 DIAGNOSIS — D84.9 IMMUNODEFICIENCY, UNSPECIFIED: ICD-10-CM

## 2024-12-23 LAB
ALBUMIN SERPL ELPH-MCNC: 4.6 G/DL
ALP BLD-CCNC: 110 U/L
ALT SERPL-CCNC: 22 U/L
ANION GAP SERPL CALC-SCNC: 14 MMOL/L
AST SERPL-CCNC: 17 U/L
BASOPHILS # BLD AUTO: 0.04 K/UL
BASOPHILS NFR BLD AUTO: 0.4 %
BILIRUB SERPL-MCNC: 0.3 MG/DL
BUN SERPL-MCNC: 21 MG/DL
CALCIUM SERPL-MCNC: 10.9 MG/DL
CHLORIDE SERPL-SCNC: 98 MMOL/L
CO2 SERPL-SCNC: 27 MMOL/L
CREAT SERPL-MCNC: 1.39 MG/DL
EGFR: 58 ML/MIN/1.73M2
EOSINOPHIL # BLD AUTO: 0.26 K/UL
EOSINOPHIL NFR BLD AUTO: 2.5 %
GLUCOSE SERPL-MCNC: 249 MG/DL
HCT VFR BLD CALC: 44.8 %
HGB BLD-MCNC: 15 G/DL
IMM GRANULOCYTES NFR BLD AUTO: 0.2 %
LYMPHOCYTES # BLD AUTO: 2.57 K/UL
LYMPHOCYTES NFR BLD AUTO: 24.2 %
MAGNESIUM SERPL-MCNC: 1.7 MG/DL
MAN DIFF?: NORMAL
MCHC RBC-ENTMCNC: 26.7 PG
MCHC RBC-ENTMCNC: 33.5 G/DL
MCV RBC AUTO: 79.9 FL
MONOCYTES # BLD AUTO: 0.91 K/UL
MONOCYTES NFR BLD AUTO: 8.6 %
NEUTROPHILS # BLD AUTO: 6.81 K/UL
NEUTROPHILS NFR BLD AUTO: 64.1 %
PLATELET # BLD AUTO: 286 K/UL
POTASSIUM SERPL-SCNC: 3.9 MMOL/L
PROT SERPL-MCNC: 7.3 G/DL
RBC # BLD: 5.61 M/UL
RBC # FLD: 13.4 %
SODIUM SERPL-SCNC: 138 MMOL/L
TACROLIMUS SERPL-MCNC: 10.5 NG/ML
WBC # FLD AUTO: 10.61 K/UL

## 2024-12-23 PROCEDURE — ZZZZZ: CPT

## 2024-12-27 RX ORDER — TACROLIMUS 0.5 MG/1
0.5 CAPSULE ORAL
Qty: 60 | Refills: 5 | Status: ACTIVE | COMMUNITY
Start: 2024-12-27 | End: 1900-01-01

## 2024-12-27 RX ORDER — TACROLIMUS 1 MG/1
1 CAPSULE ORAL
Qty: 60 | Refills: 5 | Status: DISCONTINUED | COMMUNITY
Start: 2024-12-16 | End: 2024-12-27

## 2024-12-30 LAB
HGB BLDA-MCNC: 12.5 G/DL — LOW (ref 12.6–17.4)
HGB FLD-MCNC: 12.1 G/DL — LOW (ref 12.6–17.4)
OXYHGB MFR BLDA: 94.8 % — SIGNIFICANT CHANGE UP (ref 90–95)
OXYHGB MFR BLDMV: 60.1 % — LOW (ref 90–95)
SAO2 % BLD: 61 % — SIGNIFICANT CHANGE UP (ref 60–90)
SAO2 % BLDA: 96 % — SIGNIFICANT CHANGE UP (ref 94–98)

## 2025-01-07 ENCOUNTER — APPOINTMENT (OUTPATIENT)
Dept: DERMATOLOGY | Facility: CLINIC | Age: 60
End: 2025-01-07
Payer: COMMERCIAL

## 2025-01-07 DIAGNOSIS — L21.9 SEBORRHEIC DERMATITIS, UNSPECIFIED: ICD-10-CM

## 2025-01-07 DIAGNOSIS — Z12.83 ENCOUNTER FOR SCREENING FOR MALIGNANT NEOPLASM OF SKIN: ICD-10-CM

## 2025-01-07 DIAGNOSIS — D22.9 MELANOCYTIC NEVI, UNSPECIFIED: ICD-10-CM

## 2025-01-07 PROCEDURE — 99213 OFFICE O/P EST LOW 20 MIN: CPT

## 2025-01-07 RX ORDER — KETOCONAZOLE 20 MG/ML
2 SUSPENSION TOPICAL
Qty: 1 | Refills: 12 | Status: ACTIVE | COMMUNITY
Start: 2025-01-07 | End: 1900-01-01

## 2025-01-08 PROBLEM — Z12.83 SKIN CANCER SCREENING: Status: ACTIVE | Noted: 2025-01-08

## 2025-01-08 PROBLEM — D22.9 MULTIPLE NEVI: Status: ACTIVE | Noted: 2025-01-08

## 2025-01-09 ENCOUNTER — OUTPATIENT (OUTPATIENT)
Dept: OUTPATIENT SERVICES | Facility: HOSPITAL | Age: 60
LOS: 1 days | End: 2025-01-09
Payer: COMMERCIAL

## 2025-01-09 ENCOUNTER — APPOINTMENT (OUTPATIENT)
Dept: RADIOLOGY | Facility: IMAGING CENTER | Age: 60
End: 2025-01-09

## 2025-01-09 DIAGNOSIS — Z94.1 HEART TRANSPLANT STATUS: Chronic | ICD-10-CM

## 2025-01-09 DIAGNOSIS — Z95.5 PRESENCE OF CORONARY ANGIOPLASTY IMPLANT AND GRAFT: Chronic | ICD-10-CM

## 2025-01-09 DIAGNOSIS — Z94.1 HEART TRANSPLANT STATUS: ICD-10-CM

## 2025-01-09 PROCEDURE — 77080 DXA BONE DENSITY AXIAL: CPT | Mod: 26

## 2025-01-09 PROCEDURE — 77080 DXA BONE DENSITY AXIAL: CPT

## 2025-01-13 ENCOUNTER — RX RENEWAL (OUTPATIENT)
Age: 60
End: 2025-01-13

## 2025-01-14 ENCOUNTER — NON-APPOINTMENT (OUTPATIENT)
Age: 60
End: 2025-01-14

## 2025-01-14 NOTE — REASON FOR VISIT
Pt admission complete and assessments signed. Pt with racing thoughts, sad, guarded, minimizing, with poor eye contact. Pt stated reason for admission was \"Depression.\" Pt denied holding the gun to his head and denied wish to be dead, but then stated he wondered \"How it would be if I wasn't here.\" Pt stated appetite is \"ok\" and sleep is \"pretty fine,\" stated he gets 6-7 hours sleep on average. Pt admitted using marijuana to help him fall asleep. Pt rated anxiety 2/10 and depression 5/10. Pt denied HI/AVH.     Problem: Self Harm/Suicidality  Goal: Will have no self-injury during hospital stay  Description: INTERVENTIONS:  1.  Ensure constant observer at bedside with Q15M safety checks  2.  Maintain a safe environment  3.  Secure patient belongings  4.  Ensure family/visitors adhere to safety recommendations  5.  Ensure safety tray has been added to patient's diet order  6.  Every shift and PRN: Re-assess suicidal risk via Frequent Screener    Outcome: Progressing     Problem: Anxiety  Goal: Will report anxiety at manageable levels  Description: INTERVENTIONS:  1. Administer medication as ordered  2. Teach and rehearse alternative coping skills  3. Provide emotional support with 1:1 interaction with staff  Outcome: Progressing     Problem: Coping  Goal: Pt/Family able to verbalize concerns and demonstrate effective coping strategies  Description: INTERVENTIONS:  1. Assist patient/family to identify coping skills, available support systems and cultural and spiritual values  2. Provide emotional support, including active listening and acknowledgement of concerns of patient and caregivers  3. Reduce environmental stimuli, as able  4. Instruct patient/family in relaxation techniques, as appropriate  5. Assess for spiritual pain/suffering and initiate Spiritual Care, Psychosocial Clinical Specialist consults as needed  Outcome: Progressing     Problem: Depression/Self Harm  Goal: Effect of psychiatric condition will be  minimized and patient will be protected from self harm  Description: INTERVENTIONS:  1. Assess impact of patient's symptoms on level of functioning, self care needs and offer support as indicated  2. Assess patient/family knowledge of depression, impact on illness and need for teaching  3. Provide emotional support, presence and reassurance  4. Assess for possible suicidal thoughts or ideation. If patient expresses suicidal thoughts or statements do not leave alone, initiate Suicide Precautions, move to a room close to the nursing station and obtain sitter  5. Initiate consults as appropriate with Mental Health Professional, Spiritual Care, Psychosocial CNS, and consider a recommendation to the LIP for a Psychiatric Consultation  Outcome: Progressing     Problem: Involuntary Admit  Goal: Will cooperate with staff recommendations and doctor's orders and will demonstrate appropriate behavior  Description: INTERVENTIONS:  1. Treat underlying conditions and offer medication as ordered  2. Educate regarding involuntary admission procedures and rules  3. Contain excessive/inappropriate behavior per unit and hospital policies  Outcome: Progressing     Problem: Discharge Planning  Goal: Discharge to home or other facility with appropriate resources  Outcome: Progressing      [Follow-Up] : a follow-up visit

## 2025-01-28 ENCOUNTER — RX RENEWAL (OUTPATIENT)
Age: 60
End: 2025-01-28

## 2025-02-26 NOTE — PROGRESS NOTE ADULT - ATTENDING COMMENTS
Letter released to portal  Letter printed and mailed to home address   HM updated.     57 y/o M with CAD and ischemic cardiomyopathy, admitted with SOB. EKG with ST depressions, LHC with multivessel occlusive CAD, RHC with elevated filling pressures and low cardiac output. IABP placed. Exubated 11/3/2023. Found to be Covid +.   IABP wean overnight with PA sat in the 80's, concern that it may be due to mixed cardiogenic/septic picture, not just improving cardiogenic shock. Thus would leave IABP in today until sepsis resolves. Covid treatment per primary team.  Add Aldactone for hypokalemia. No further HF medical therapy until more hemodynamically stable.

## 2025-03-06 LAB
ALBUMIN SERPL ELPH-MCNC: 4.7 G/DL
ALP BLD-CCNC: 115 U/L
ALT SERPL-CCNC: 24 U/L
ANION GAP SERPL CALC-SCNC: 20 MMOL/L
AST SERPL-CCNC: 23 U/L
BASOPHILS # BLD AUTO: 0.05 K/UL
BASOPHILS NFR BLD AUTO: 0.5 %
BILIRUB SERPL-MCNC: 0.2 MG/DL
BUN SERPL-MCNC: 22 MG/DL
CALCIUM SERPL-MCNC: 10.6 MG/DL
CHLORIDE SERPL-SCNC: 97 MMOL/L
CO2 SERPL-SCNC: 24 MMOL/L
CREAT SERPL-MCNC: 1.33 MG/DL
EGFRCR SERPLBLD CKD-EPI 2021: 61 ML/MIN/1.73M2
EOSINOPHIL # BLD AUTO: 0.28 K/UL
EOSINOPHIL NFR BLD AUTO: 2.5 %
GLUCOSE SERPL-MCNC: 225 MG/DL
HCT VFR BLD CALC: 47.4 %
HGB BLD-MCNC: 15.6 G/DL
IMM GRANULOCYTES NFR BLD AUTO: 1.4 %
LYMPHOCYTES # BLD AUTO: 2.73 K/UL
LYMPHOCYTES NFR BLD AUTO: 24.6 %
MAGNESIUM SERPL-MCNC: 1.5 MG/DL
MAN DIFF?: NORMAL
MCHC RBC-ENTMCNC: 26 PG
MCHC RBC-ENTMCNC: 32.9 G/DL
MCV RBC AUTO: 79.1 FL
MONOCYTES # BLD AUTO: 0.91 K/UL
MONOCYTES NFR BLD AUTO: 8.2 %
NEUTROPHILS # BLD AUTO: 6.98 K/UL
NEUTROPHILS NFR BLD AUTO: 62.8 %
PLATELET # BLD AUTO: 358 K/UL
POTASSIUM SERPL-SCNC: 3.5 MMOL/L
PROT SERPL-MCNC: 7.6 G/DL
RBC # BLD: 5.99 M/UL
RBC # FLD: 13.7 %
SODIUM SERPL-SCNC: 141 MMOL/L
TACROLIMUS SERPL-MCNC: 9.3 NG/ML
WBC # FLD AUTO: 11.11 K/UL

## 2025-06-29 NOTE — PROGRESS NOTE ADULT - PROBLEM SELECTOR PLAN 2
- s/p Simulect on POD 0, plan to receive second dose on POD 4 (12/29).  - Continue with Steroid taper per protocol   - Continue with Cellcept 1g IV BID  - will continue to adjust tacro as needed for goal will be 12-14 Unknown

## (undated) DEVICE — PACK UNIVERSAL CARDIAC SUPPLEMNTAL B

## (undated) DEVICE — PACK MINOR NO DRAPE

## (undated) DEVICE — NDL HYPO SAFE 25G X 1.5" (ORANGE)

## (undated) DEVICE — DRSG COBAN 4" LF NONSTERILE

## (undated) DEVICE — DRSG DERMABOND PRINEO 22CM

## (undated) DEVICE — SOL INJ NS 0.9% 500ML 2 PORT

## (undated) DEVICE — FOLEY HOLDER STATLOCK 2 WAY ADULT

## (undated) DEVICE — FORCEP RADIAL JAW 4 JUMBO 2.8MM 3.2MM 240CM ORANGE DISP

## (undated) DEVICE — GOWN TRIMAX LG

## (undated) DEVICE — SUT DOUBLE 6 WIRE STERNAL

## (undated) DEVICE — DRAPE 1/2 SHEET 40X57"

## (undated) DEVICE — CANISTER SUCTION 2000CC

## (undated) DEVICE — CONNECTOR CARDIAC 1:1 FOR HUBLESS DRAINS

## (undated) DEVICE — GOWN LG

## (undated) DEVICE — IRRIGATOR BIO SHIELD

## (undated) DEVICE — FOR-ESU VALLEYLAB T7E14848DX: Type: DURABLE MEDICAL EQUIPMENT

## (undated) DEVICE — CATH IV SAFE BC 20G X 1.16" (PINK)

## (undated) DEVICE — SUT VICRYL 2-0 27" CT-1 UNDYED

## (undated) DEVICE — TUBING SUCTION CONN 6FT STERILE

## (undated) DEVICE — SUT SOFSILK 2-0 30" TIES

## (undated) DEVICE — GOWN TRIMAX XXL

## (undated) DEVICE — ELCTR AQUAMANTYS BIPOLAR SEALER 6.0

## (undated) DEVICE — SUT ETHIBOND 1 30" CT-1

## (undated) DEVICE — SYR LUER LOK 20CC

## (undated) DEVICE — TUBING TRUWAVE PRESSURE MALE/FEMALE 72"

## (undated) DEVICE — SUT PERMAHAND 1 10-30"

## (undated) DEVICE — SUT CHROMIC 4-0 18" P-12

## (undated) DEVICE — SUT CHROMIC 3-0 27" P-12

## (undated) DEVICE — DRAPE TOWEL BLUE 17" X 24"

## (undated) DEVICE — Device

## (undated) DEVICE — SUT PLEDGET SOFT LARGE 3/8" X 3/16" X 1/16" X6

## (undated) DEVICE — DRSG GAUZE VASELINE PETROLEUM 3 X 9"

## (undated) DEVICE — SENSOR O2 FINGER ADULT

## (undated) DEVICE — BRUSH COLONOSCOPY CYTOLOGY

## (undated) DEVICE — DRSG QUICKCLOT CONTROL PLUS Z FOLD 3X72"

## (undated) DEVICE — GOWN XL

## (undated) DEVICE — GLV 7.5 PROTEXIS (WHITE)

## (undated) DEVICE — SUT PROLENE 6-0 30" C-1

## (undated) DEVICE — TUBING SUCTION 20FT

## (undated) DEVICE — ELCTR GROUNDING PAD ADULT COVIDIEN

## (undated) DEVICE — CONN REDUC WYE 0.5X3/8X3/8IN

## (undated) DEVICE — BIOPSY FORCEP RADIAL JAW 4 STANDARD WITH NEEDLE

## (undated) DEVICE — PREP DURAPREP 26CC

## (undated) DEVICE — WARMING BLANKET FULL ADULT

## (undated) DEVICE — CLAMP BX HOT RAD JAW 3

## (undated) DEVICE — CARDIOPLEGIA MANAGEMENT SET COLOR CODED CLAMPS

## (undated) DEVICE — SUT SILK 0 30" TIES

## (undated) DEVICE — SOL IRR POUR NS 0.9% 500ML

## (undated) DEVICE — SUT SILK 2-0 30" SH (POP-OFF)

## (undated) DEVICE — ELCTR BOVIE TIP BLADE INSULATED 2.75" EDGE

## (undated) DEVICE — SAW BLADE SYNVASIVE OSCILLATING

## (undated) DEVICE — SUT PROLENE 5-0 36" C-1

## (undated) DEVICE — SUT PDS II 1 36" CTX

## (undated) DEVICE — SUT PROLENE 4-0 36" RB-1

## (undated) DEVICE — DRAPE SLUSH / WARMER 44 X 66"

## (undated) DEVICE — SUT PROLENE 3-0 36" RB-1

## (undated) DEVICE — SUT STAINLESS STEEL 5 18" CCS

## (undated) DEVICE — SUMP PERICARDIAL 20FR 1/4" ADULT

## (undated) DEVICE — LAP PAD 18 X 18"

## (undated) DEVICE — VASCULAR DILATOR KIT 8,12,16,20, 24FR

## (undated) DEVICE — DRSG TEGADERM CHLORHEXIDINE 3.5 X 4.5"

## (undated) DEVICE — CHEST DRAIN PLEUR-EVAC DRY/WET ADULT-PEDS SINGLE (QUICK)

## (undated) DEVICE — DRAPE LAPAROTOMY W POUCHES

## (undated) DEVICE — SUT PROLENE 3-0 36" SH-1

## (undated) DEVICE — POLY TRAP ETRAP

## (undated) DEVICE — TUBING IV SET GRAVITY 3Y 100" MACRO

## (undated) DEVICE — PACK UNIVERSAL CARDIAC

## (undated) DEVICE — DRAPE MAYO STAND 30"

## (undated) DEVICE — DRSG OPSITE 2.5 X 2"

## (undated) DEVICE — SUCTION YANKAUER NO CONTROL VENT

## (undated) DEVICE — SYR LUER LOK 50CC

## (undated) DEVICE — VESSEL LOOP MAXI-RED  0.120" X 16"

## (undated) DEVICE — ELCTR BOVIE TIP BLADE INSULATED 6.5" EDGE

## (undated) DEVICE — PACING CABLE (BLUE) ATRIAL TEMP SCREW DOWN 12FT

## (undated) DEVICE — SUT PROLENE 4-0 36" SH

## (undated) DEVICE — SOL IRR POUR NS 0.9% 1500ML

## (undated) DEVICE — WARMING BLANKET UPPER ADULT

## (undated) DEVICE — TOURNIQUET SET 12FR (1 RED, 1 BLUE, 1 SNARE) 7"

## (undated) DEVICE — SUT MONOCRYL 4-0 18" PS-2

## (undated) DEVICE — SUT SOFSILK 2 60" TIES

## (undated) DEVICE — SPONGE PEANUT AUTO COUNT

## (undated) DEVICE — NDL COUNTER FOAM AND MAGNET 40-70

## (undated) DEVICE — SAW BLADE MICROAIRE STERNUM 1X34X9.4MM

## (undated) DEVICE — SPECIMEN CONTAINER 100ML

## (undated) DEVICE — PREP BETADINE SPONGE STICKS

## (undated) DEVICE — PACING CABLE (BROWN) A/V TEMP SCREW DOWN 12FT

## (undated) DEVICE — VENTING ADAPTER "Y" (RED/BLUE) 7.5"

## (undated) DEVICE — DRSG PREVENA PEEL & PLACE KIT 20CM

## (undated) DEVICE — SUT VICRYL 3-0 27" CT-1

## (undated) DEVICE — SUT BOOT STANDARD (YELLOW) 5 PAIR

## (undated) DEVICE — SUT PROLENE 5-0 36" RB-1

## (undated) DEVICE — SOL NORMOSOL-R PH7.4 1000ML

## (undated) DEVICE — DRSG TEGADERM 6"X8"

## (undated) DEVICE — URETERAL CATH RED RUBBER 18FR (RED)

## (undated) DEVICE — BLADE SCALPEL SAFETYLOCK #15

## (undated) DEVICE — CATH IV SAFE BC 22G X 1" (BLUE)

## (undated) DEVICE — VESSEL LOOP ASPEN SUPERMAXI BLUE

## (undated) DEVICE — COVER PROBE W/GEL 18X120CM STRL 50/BX

## (undated) DEVICE — FOLEY TRAY 16FR 5CC LF LUBRISIL ADVANCE TEMP CLOSED

## (undated) DEVICE — DRAPE IOBAN 33" X 23"

## (undated) DEVICE — VENODYNE/SCD SLEEVE CALF MEDIUM

## (undated) DEVICE — POSITIONER FOAM EGG CRATE ULNAR 2PCS (PINK)

## (undated) DEVICE — PACK IV START WITH CHG

## (undated) DEVICE — DRSG KLING 2"

## (undated) DEVICE — DRSG DERMABOND PRINEO 60CM

## (undated) DEVICE — SUMP INTRACARDIAC 20FR 1/4" ADULT

## (undated) DEVICE — GLV 8 PROTEXIS (WHITE)

## (undated) DEVICE — SUT SILK 2-0 18" SH (POP-OFF)

## (undated) DEVICE — DRSG OPSITE 13.75 X 4"

## (undated) DEVICE — SAW BLADE MICROAIRE OSCILLATING LG 0.9X64X33.5MM

## (undated) DEVICE — SUT PROLENE 3-0 48" SH

## (undated) DEVICE — FEEDING TUBE NG SUMP 16FR 48"

## (undated) DEVICE — VESSEL LOOP MINI-RED 0.7" X 16"

## (undated) DEVICE — GOWN SLEEVES

## (undated) DEVICE — DRAPE 3/4 SHEET W REINFORCEMENT 56X77"